# Patient Record
Sex: MALE | HISPANIC OR LATINO | Employment: UNEMPLOYED | URBAN - METROPOLITAN AREA
[De-identification: names, ages, dates, MRNs, and addresses within clinical notes are randomized per-mention and may not be internally consistent; named-entity substitution may affect disease eponyms.]

---

## 2017-01-02 ENCOUNTER — TELEPHONE (OUTPATIENT)
Dept: PEDIATRICS | Facility: CLINIC | Age: 62
End: 2017-01-02

## 2017-01-02 DIAGNOSIS — Z79.4 TYPE 2 DIABETES MELLITUS WITH DIABETIC NEPHROPATHY, WITH LONG-TERM CURRENT USE OF INSULIN (H): Primary | Chronic | ICD-10-CM

## 2017-01-02 DIAGNOSIS — E11.21 TYPE 2 DIABETES MELLITUS WITH DIABETIC NEPHROPATHY, WITH LONG-TERM CURRENT USE OF INSULIN (H): Primary | Chronic | ICD-10-CM

## 2017-01-02 NOTE — TELEPHONE ENCOUNTER
Prior authorization required for : Ta Solostar  Insurance plan: [employer] HERNANDEZ  Patient Id: [id number]  [facility] Desk Number: [phone]    Please fax over an alternative medication to the pharmacy or if you are going to pursue a prior authorization please contact the pharmacy and patient when approved. Thanks!    Thank you  Maribell Barros CPht    [facility] [place] (Monday-Thursday)  Phone: [phone]  Fax: [phone]  [facility] (Friday)  Phone: [phone]  Fax: [phone]

## 2017-01-03 DIAGNOSIS — I50.22 CHRONIC SYSTOLIC HEART FAILURE (H): ICD-10-CM

## 2017-01-03 LAB
ANION GAP SERPL CALCULATED.3IONS-SCNC: 5 MMOL/L (ref 3–14)
BUN SERPL-MCNC: 36 MG/DL (ref 7–30)
CALCIUM SERPL-MCNC: 9 MG/DL (ref 8.5–10.1)
CHLORIDE SERPL-SCNC: 99 MMOL/L (ref 94–109)
CO2 SERPL-SCNC: 30 MMOL/L (ref 20–32)
CREAT SERPL-MCNC: 2.16 MG/DL (ref 0.66–1.25)
GFR SERPL CREATININE-BSD FRML MDRD: 31 ML/MIN/1.7M2
GLUCOSE SERPL-MCNC: 253 MG/DL (ref 70–99)
POTASSIUM SERPL-SCNC: 4.9 MMOL/L (ref 3.4–5.3)
SODIUM SERPL-SCNC: 134 MMOL/L (ref 133–144)

## 2017-01-03 PROCEDURE — 36415 COLL VENOUS BLD VENIPUNCTURE: CPT | Performed by: NURSE PRACTITIONER

## 2017-01-03 PROCEDURE — 80048 BASIC METABOLIC PNL TOTAL CA: CPT | Performed by: NURSE PRACTITIONER

## 2017-01-04 ENCOUNTER — OFFICE VISIT (OUTPATIENT)
Dept: CARDIOLOGY | Facility: CLINIC | Age: 62
End: 2017-01-04
Payer: COMMERCIAL

## 2017-01-04 VITALS
WEIGHT: 190.38 LBS | BODY MASS INDEX: 31.68 KG/M2 | DIASTOLIC BLOOD PRESSURE: 67 MMHG | OXYGEN SATURATION: 97 % | HEART RATE: 78 BPM | SYSTOLIC BLOOD PRESSURE: 111 MMHG

## 2017-01-04 DIAGNOSIS — I50.22 CHRONIC SYSTOLIC HEART FAILURE (H): Primary | ICD-10-CM

## 2017-01-04 PROCEDURE — 99214 OFFICE O/P EST MOD 30 MIN: CPT | Performed by: NURSE PRACTITIONER

## 2017-01-04 NOTE — LETTER
1/4/2017      RE: Lucian Oliveira  [address] [facility] [address]       Dear Colleague,    Thank you for the opportunity to participate in the care of your patient, Lucian Oliveira, at the [facility]. Please see a copy of my visit note below.    No SOB  CP yesterday outside--very cold--tried to go for a walk. Improved with rest and getting back to warm air. Not accompanied by nausea or diaphoresis  No Palpitations, lightheadedness, edema,     Occ bloating. No nausea. Appetite good  2 pillows. Using CPAP. No PND    No recent med changes. No recent NSAIDs        /67 mmHg  Pulse 78  Wt 86.354 kg (190 lb 6 oz)  SpO2 97%  No JVP   Lungs clear   RRR  Belly soft   no edema      Last Basic Metabolic Panel:  NA      134   1/3/2017   POTASSIUM      4.9   1/3/2017  CHLORIDE       99   1/3/2017  BRYANNA      9.0   1/3/2017  CO2       30   1/3/2017  BUN       36   1/3/2017  CR     2.16   1/3/2017  GLC      253   1/3/2017      A/P  Repeat labs in 1 week, if cr still high, consider reducing lasix or wilda  RTC 3 months  Limit potassium rich foods    Please do not hesitate to contact me if you have any questions/concerns.     Sincerely,     Cassandra Mcgovern NP

## 2017-01-04 NOTE — NURSING NOTE
"Chief Complaint   Patient presents with     Follow Up For     3.5 month Return CORE appt; 61yr old male wth a h/o chronic systolic heart failure secondary to ICM presenting for follow-up. Labs prior. Patient reports chest pain when he is outdoors in the cold.       Initial /67 mmHg  Pulse 78  Wt 86.354 kg (190 lb 6 oz)  SpO2 97% Estimated body mass index is 31.68 kg/(m^2) as calculated from the following:    Height as of 12/22/16: 1.651 m (5' 5\").    Weight as of this encounter: 86.354 kg (190 lb 6 oz)..  BP completed using cuff size: dayne Rodriguez M.A.          "

## 2017-01-04 NOTE — PROGRESS NOTES
HPI:    Lucian Oliveira is a pleasant, 60-year-old male with known coronary artery disease status post coronary artery bypass grafting and percutaneous coronary artery intervention in the past, ischemic cardiomyopathy and congestive heart failure, ejection fraction 10%. He has been following in [facility] along with Dr. Sheridan for ongoing management of congestive heart failure and ongoing evaluation for possible advanced heart failure therapies.   Lucian reports feeling well in general with no shortness of breath, palpitations, lightheadedness, edema, orthopnea, or PND. He did note some chest pain yesterday when outside. The pain resolved with coming inside. He relates the pain with the cold temperatures and did not note any accompanying nausea or diaphoresis.   Lucian has not had any recent medication changes and is using his CPAP.    PAST MEDICAL HISTORY:  Past Medical History   Diagnosis Date     NO ACTIVE PROBLEMS        FAMILY HISTORY:  Family History   Problem Relation Age of Onset     DIABETES Brother      DIABETES Sister      DIABETES Sister      Macular Degeneration No family hx of      Glaucoma No family hx of      Myocardial Infarction No family hx of      KIDNEY DISEASE No family hx of        SOCIAL HISTORY:  Social History     Social History     Marital Status:      Spouse Name: N/A     Number of Children: 4     Years of Education: N/A     Occupational History      [employer]     Social History Main Topics     Smoking status: Never Smoker      Smokeless tobacco: Never Used      Comment: Never smoked; non-smoking household     Alcohol Use: Yes      Comment: rare use     Drug Use: No     Sexual Activity:     Partners: Female     Other Topics Concern     Parent/Sibling W/ Cabg, Mi Or Angioplasty Before 65f 55m? No     Social History Narrative       CURRENT MEDICATIONS:  Current Outpatient Prescriptions   Medication     order for DME     blood glucose calibration (ONETOUCH  ULTRA CONTROL) solution     losartan (COZAAR) 50 MG tablet     isosorbide mononitrate (IMDUR) 30 MG 24 hr tablet     furosemide (LASIX) 40 MG tablet     atorvastatin (LIPITOR) 40 MG tablet     glipiZIDE (GLUCOTROL) 5 MG tablet     carvedilol (COREG) 25 MG tablet     clopidogrel (PLAVIX) 75 MG tablet     spironolactone (ALDACTONE) 25 MG tablet     insulin pen needle 31G X 5 MM     albuterol (PROAIR HFA, PROVENTIL HFA, VENTOLIN HFA) 108 (90 BASE) MCG/ACT inhaler     aspirin 81 MG tablet     blood glucose monitoring (ACCU-CHEK FELIPE SMARTVIEW) meter device kit     blood glucose monitoring (ONE TOUCH ULTRA) test strip     blood glucose monitoring (ONE TOUCH DELICA) lancets     insulin glargine (BASAGLAR KWIKPEN) 100 UNIT/ML injection     blood glucose monitoring (NO BRAND SPECIFIED) meter device kit     order for DME     nitroglycerin (NITROSTAT) 0.4 MG SL tablet     No current facility-administered medications for this visit.        ROS:   Constitutional: No fever, chills, or sweats. No weight gain/loss.   ENT: No visual disturbance, ear ache, epistaxis, sore throat.   Allergies/Immunologic: Negative.   Respiratory: No cough, hemoptysis.   Cardiovascular: As per HPI.   GI: No nausea, vomiting, hematemesis, melena, or hematochezia.   : No urinary frequency, dysuria, or hematuria.   Integument: Negative.   Psychiatric: Negative.   Neuro: Negative.   Endocrinology: Negative.   Musculoskeletal: Negative.    EXAM:  /67  Pulse 78  Wt 86.4 kg (190 lb 6 oz)  SpO2 97%  BMI 31.68 kg/m2  General: appears comfortable, alert and articulate  Head: normocephalic, atraumatic  Eyes: anicteric sclera, EOMI  Neck: no adenopathy  Orophyarynx: moist mucosa, no lesions, dentition intact  Heart: regular, S1/S2, no murmur, gallop, rub, JVP is flat  Lungs: clear, no rales or wheezing  Abdomen: soft, non-tender, bowel sounds present, no hepatomegaly  Extremities: no clubbing, cyanosis or edema  Neurological: normal speech and affect, no  gross motor deficits    Labs:   Last Basic Metabolic Panel:  NA      134   1/3/2017   POTASSIUM      4.9   1/3/2017  CHLORIDE       99   1/3/2017  BRYANNA      9.0   1/3/2017  CO2       30   1/3/2017  BUN       36   1/3/2017  CR     2.16   1/3/2017  GLC      253   1/3/2017    Assessment and Plan:   Mr. Oliveira is a pleasant 61 year old man with a history of ischemic cardiomyopathy and advanced heart failure who looks pretty good today though his renal function has worsened for no clear reason. We'll repeat a BMP in 1 week. He will return to clinic in 3 months or as needed.  1. Chronic systolic heart failure secondary to ischemic cardiomyopathy  ACE/ARB-Yes  BB-yes  Ald Ant-yes  SCD Prevention: ICD  %BiV: N/A  Volume: Evolemic  Apnea screening. Using CPAP  Follow up: 3 months in clinic and labs in 1week  2. CAD. On aspirin, beta blocker, and nitroglycerin  3. Chronic kidney disease. Sees Dr. Elaine.     20 minutes spent in direct care, of which >50% in counseling    CC  Patient Care Team:  Anna Archuleta PA-C as PCP - General (Physician Assistant)  Lucille Armendariz, RN as Nurse Coordinator (Cardiology)  Cassandra Mcgovern NP as Nurse Practitioner (Cardiology)

## 2017-01-04 NOTE — PATIENT INSTRUCTIONS
Thank you for coming to the [facility];  please note the following instructions:      1.  Please have your labs rechecked in 1 week.  2.  Please limit your dietarty potassium - avoid high potassium foods  3.  Please make a follow-up CORE/heart failure appt with Cassandra in 3 months with labs prior.       Results for BUCK ALFARO (MRN [id number]) as of 1/4/2017 11:32   Ref. Range 1/3/2017 08:57   Sodium Latest Ref Range: 133-144 mmol/L 134   Potassium Latest Ref Range: 3.4-5.3 mmol/L 4.9   Chloride Latest Ref Range:  mmol/L 99   Carbon Dioxide Latest Ref Range: 20-32 mmol/L 30   Urea Nitrogen Latest Ref Range: 7-30 mg/dL 36 (H)   Creatinine Latest Ref Range: 0.66-1.25 mg/dL 2.16 (H)   GFR Estimate Latest Ref Range: >60 mL/min/1.7m2 31 (L)   GFR Estimate If Black Latest Ref Range: >60 mL/min/1.7m2 38 (L)   Calcium Latest Ref Range: 8.5-10.1 mg/dL 9.0   Anion Gap Latest Ref Range: 3-14 mmol/L 5   Glucose Latest Ref Range: 70-99 mg/dL 253 (H)       Please continue to follow a low salt diet, limit your water intake to 2 liters per day and continue to exercise. 2 Liters a day = 8.5 cups, or 72 ounces.    If you gain 2# in 24 hours or 5# in one week call Jack Armendariz RN so we can adjust your medications as needed over the phone.    [facility] [place] Location    If you have any questions regarding to your visit please contact your care team:     Cardiology  Telephone Number   Jack BONILLA, Cardiology RN  Evette BIRD,[facility]  Lilliana BONILLA, [facility] [phone]; option 1 and then option 3   For scheduling appts:     [phone]           If you need a medication refill please contact your pharmacy.  Please allow 3 business days for your refill to be completed.    As always, Thank you for trusting us with your health care needs!  __________________________________________________________  [facility] Cardiomyopathy, Optimization, Rehabilitation, Education    The [facility] is a heart  "failure specialty clinic within the [facility] where you will work with specialized nurse practioners dedicated to helping patients with heart failure carefully adjust medications, receive education, and learn who and when to call if symptoms develop.  They specialize in helping you better understand your condition, slow the progression of your disease, improve the length and quality of your life, help you detect future heart problems before they become life threatening, and avoid hospitalizations.          Potassium-Rich Foods  The normal adult diet usually contains 2,000 mcg to 4,000 mcg (50 to 100 mEq) of potassium per day. More potassium is needed when there is excess loss of potassium from the body. Diuretic medicines (\"water pills\"), diarrhea, and vomiting can cause this. To increase the amount of potassium in your diet, include these high potassium foods.    [The (*) indicates foods highest in potassium.]  Vegetables  Artichokes - cooked 1/2 cup, 200 mg to 300 mg*  Asparagus - cooked 1/2 cup, 200 mg to 300 mg  Beans, white, red, quigley cooked 1/2 cup, 300 mg to 500 mg*  Beets - cooked 1/2 cup, 200 mg to 300 mg  Broccoli - cooked or raw 1 cup, 200 mg to 500 mg*  [place] sprouts - cooked 1/2 cup, 200 mg to 300 mg  Cabbage - raw 1 cup, 100 mg to 200 mg  Carrots - raw or cooked 1/2 cup, 100 mg to 200 mg  Celery - raw 1 cup, 300 mg to 400 mg   [place] Beans - fresh or frozen 1/2 cup, 300 mg to 500 mg*   Mushrooms - raw or cooked 1/2 cup, 100 mg to 300 mg  Peas - cooked 1/2 cup, 200 mg to 300 mg   Potatoes - baked 1 medium, 500 mg to 900 mg*   Spinach - raw 2 cups, 300 mg to 400 mg *  Spinach - cooked 1 cups, 800 mg to 900 mg*   Squash, winter - fresh, frozen, or cooked 1/2 cup, 200 mg to 400 mg   Tomato - fresh 1 medium, 200 mg to 300 mg   Tomato juice - canned 1/2 cup, 200 mg to 300 mg   Fruits  Apple juice - unsweetened 1 cup, 200 mg to 300 mg   Apricots - canned 1/2 cup, 200 " mg to 300 mg   Apricots - dried 4 pieces, 100 mg to 200 mg   Avocado - raw 1/2 cup, 300 mg to 400 mg*  Banana - fresh 1 small, 300 mg to 400 mg*   Blackberries - fresh or frozen 1 cup, 200 mg to 300 mg   Cantaloupe - fresh 1 cup diced, 300 mg to 400 mg*   Grape juice - unsweetened 1 cup, 200 mg to 300 mg   Honeydew melon - fresh 1 cup diced, 300 mg to 400 mg*   Orange juice - unsweetened, fresh or frozen 1/2 cup, 200 mg to 300 mg  Orange - fresh 1 medium, 200 mg to 300 mg    Pineapple juice - unsweetened 1 cup, 300 mg to 400 mg   Prune juice - unsweetened 1/2 cup, 300 mg to 400 mg*   Prunes - dried 5 pieces, 300 mg to 400 mg*   Strawberries - fresh or frozen 1 cup, 200 mg to 300 mg  Meat  Red Meat - cooked 3 oz, 100 mg to 300 mg   Shrimp - cooked 3/4 cup, 100 mg to 200 mg   Tuna - fresh or canned 3/4 cup, 200 mg to 500 mg   Cod, flounder, halibut - cooked 3 oz, 100 mg to 300 mg*  [place] - cooked 3 oz, 300 mg to 400 mg*   Scallops - cooked 3 oz, 200 mg to 300 mg*    [id number] [employer]. [address] [place] [address]. All rights reserved. This information is not intended as a substitute for professional medical care. Always follow your healthcare professional's instructions.

## 2017-01-04 NOTE — MR AVS SNAPSHOT
After Visit Summary   1/4/2017    Lucian Oliveira    MRN: [id number]           Patient Information     Date Of Birth          1955        Visit Information        Provider Department      1/4/2017 11:00 AM Cassandra Mcgovern NP; [employer] [employer] [facility] PHYSICIANS HEART AT [facility]        Today's Diagnoses     Chronic systolic heart failure (H)    -  1       Care Instructions    Thank you for coming to the [facility] Heart @ [facility];  please note the following instructions:      1.  Please have your labs rechecked in 1 week.  2.  Please limit your dietarty potassium - avoid high potassium foods  3.  Please make a follow-up CORE/heart failure appt with Cassandra in 3 months with labs prior.       Results for LUCIAN OLIVEIRA (MRN [id number]) as of 1/4/2017 11:32   Ref. Range 1/3/2017 08:57   Sodium Latest Ref Range: 133-144 mmol/L 134   Potassium Latest Ref Range: 3.4-5.3 mmol/L 4.9   Chloride Latest Ref Range:  mmol/L 99   Carbon Dioxide Latest Ref Range: 20-32 mmol/L 30   Urea Nitrogen Latest Ref Range: 7-30 mg/dL 36 (H)   Creatinine Latest Ref Range: 0.66-1.25 mg/dL 2.16 (H)   GFR Estimate Latest Ref Range: >60 mL/min/1.7m2 31 (L)   GFR Estimate If Black Latest Ref Range: >60 mL/min/1.7m2 38 (L)   Calcium Latest Ref Range: 8.5-10.1 mg/dL 9.0   Anion Gap Latest Ref Range: 3-14 mmol/L 5   Glucose Latest Ref Range: 70-99 mg/dL 253 (H)       Please continue to follow a low salt diet, limit your water intake to 2 liters per day and continue to exercise. 2 Liters a day = 8.5 cups, or 72 ounces.    If you gain 2# in 24 hours or 5# in one week call Jack Armendariz RN so we can adjust your medications as needed over the phone.    [facility] [[email or web address]] Cardiology - [place] Location    If you have any questions regarding to your visit please contact your care team:     Cardiology  Telephone Number   Jack BONILLA, Cardiology AURORA BIRD,KUSUM BONILLA, [facility] [phone];  "option 1 and then option 3   For scheduling appts:     [phone]           If you need a medication refill please contact your pharmacy.  Please allow 3 business days for your refill to be completed.    As always, Thank you for trusting us with your health care needs!  __________________________________________________________  C.[facility].E[facility]INIC Cardiomyopathy, Optimization, Rehabilitation, Education    The C.O.R.E. CLINIC is a heart failure specialty clinic within the [facility] where you will work with specialized nurse practioners dedicated to helping patients with heart failure carefully adjust medications, receive education, and learn who and when to call if symptoms develop.  They specialize in helping you better understand your condition, slow the progression of your disease, improve the length and quality of your life, help you detect future heart problems before they become life threatening, and avoid hospitalizations.          Potassium-Rich Foods  The normal adult diet usually contains 2,000 mcg to 4,000 mcg (50 to 100 mEq) of potassium per day. More potassium is needed when there is excess loss of potassium from the body. Diuretic medicines (\"water pills\"), diarrhea, and vomiting can cause this. To increase the amount of potassium in your diet, include these high potassium foods.    [The (*) indicates foods highest in potassium.]  Vegetables  Artichokes - cooked 1/2 cup, 200 mg to 300 mg*  Asparagus - cooked 1/2 cup, 200 mg to 300 mg  Beans, white, red, quigley cooked 1/2 cup, 300 mg to 500 mg*  Beets - cooked 1/2 cup, 200 mg to 300 mg  Broccoli - cooked or raw 1 cup, 200 mg to 500 mg*  [place]et sprouts - cooked 1/2 cup, 200 mg to 300 mg  Cabbage - raw 1 cup, 100 mg to 200 mg  Carrots - raw or cooked 1/2 cup, 100 mg to 200 mg  Celery - raw 1 cup, 300 mg to 400 mg   [place]ima Beans - fresh or frozen 1/2 cup, 300 mg to 500 mg*   Mushrooms - raw or cooked 1/2 cup, 100 mg to " 300 mg  Peas - cooked 1/2 cup, 200 mg to 300 mg   Potatoes - baked 1 medium, 500 mg to 900 mg*   Spinach - raw 2 cups, 300 mg to 400 mg *  Spinach - cooked 1 cups, 800 mg to 900 mg*   Squash, winter - fresh, frozen, or cooked 1/2 cup, 200 mg to 400 mg   Tomato - fresh 1 medium, 200 mg to 300 mg   Tomato juice - canned 1/2 cup, 200 mg to 300 mg   Fruits  Apple juice - unsweetened 1 cup, 200 mg to 300 mg   Apricots - canned 1/2 cup, 200 mg to 300 mg   Apricots - dried 4 pieces, 100 mg to 200 mg   Avocado - raw 1/2 cup, 300 mg to 400 mg*  Banana - fresh 1 small, 300 mg to 400 mg*   Blackberries - fresh or frozen 1 cup, 200 mg to 300 mg   Cantaloupe - fresh 1 cup diced, 300 mg to 400 mg*   Grape juice - unsweetened 1 cup, 200 mg to 300 mg   Honeydew melon - fresh 1 cup diced, 300 mg to 400 mg*   Orange juice - unsweetened, fresh or frozen 1/2 cup, 200 mg to 300 mg  Orange - fresh 1 medium, 200 mg to 300 mg    Pineapple juice - unsweetened 1 cup, 300 mg to 400 mg   Prune juice - unsweetened 1/2 cup, 300 mg to 400 mg*   Prunes - dried 5 pieces, 300 mg to 400 mg*   Strawberries - fresh or frozen 1 cup, 200 mg to 300 mg  Meat  Red Meat - cooked 3 oz, 100 mg to 300 mg   Shrimp - cooked 3/4 cup, 100 mg to 200 mg   Tuna - fresh or canned 3/4 cup, 200 mg to 500 mg   Cod, flounder, halibut - cooked 3 oz, 100 mg to 300 mg*  Polk - cooked 3 oz, 300 mg to 400 mg*   Scallops - cooked 3 oz, 200 mg to 300 mg*    5232-5376 [employer]. [address] [place] [address]. All rights reserved. This information is not intended as a substitute for professional medical care. Always follow your healthcare professional's instructions.              Follow-ups after your visit        Your next 10 appointments already scheduled     Jan 05, 2017  7:30 AM   Office Visit with Anna Archuleta PA-C, [employer] [employer] [facility]   [facility]    [address]  [place]  [address]   [phone]           Bring a current list of meds and any records pertaining to this visit.  For Physicals, please bring immunization records and any forms needing to be filled out.  Please arrive 10 minutes early to complete paperwork.            Jan 11, 2017  7:40 AM   Return Sleep Patient with Kai Valdez MD   [facility] ([facility])    [address] [address]   [phone]            Jan 12, 2017  7:45 AM   [facility]    [address] [address]   [phone]           Patient must bring picture ID.  Patient should be prepared to give a urine specimen  Please do not eat 10-12 hours before your appointment if you are coming in fasting for labs on lipids, cholesterol, or glucose (sugar).  Pregnant women should follow their Care Team instructions. Water with medications is okay. Do not drink coffee or other fluids.   If you have concerns about taking  your medications, please ask at office or if scheduling via Veronica, send a message by clicking on Secure Messaging, Message Your Care Team.            Apr 25, 2017  7:45 AM   LAB with [facility] LAB   [facility]    [address] [address]   [phone]           Patient must bring picture ID.  Patient should be prepared to give a urine specimen  Please do not eat 10-12 hours before your appointment if you are coming in fasting for labs on lipids, cholesterol, or glucose (sugar).  Pregnant women should follow their Care Team instructions. Water with medications is okay. Do not drink coffee or other fluids.   If you have concerns about taking  your medications, please ask at office or if scheduling via Veronica, send a message by clicking on Secure Messaging, Message Your Care Team.            Apr 26, 2017 10:30 AM   Return Visit with Cassandra  Wade Mcgovern NP   [facility] PHYSICIANS HEART AT [place] ([facility])    [address]  [place] [address]   [phone]            Jun 05, 2017 10:00 AM   LAB with ACUTE CARE LAB UU   [facility], Lab ([facility])    [address] [address]              Patient must bring picture ID.  Patient should be prepared to give a urine specimen  Please do not eat 10-12 hours before your appointment if you are coming in fasting for labs on lipids, cholesterol, or glucose (sugar).  Pregnant women should follow their Care Team instructions. Water with medications is okay. Do not drink coffee or other fluids.   If you have concerns about taking  your medications, please ask at office or if scheduling via Canal do Credito, send a message by clicking on Secure Messaging, Message Your Care Team.            Jun 05, 2017 10:30 AM   Card Cardpul Stress Tst Adult with UUEKGS   [facility] ELECTROCARDI[facility] ([facility])    [address] [address]               Jun 05, 2017  1:00 PM   Ech Coleman with UUETEER1   [facility],  Echocardiography ([facility])    [address] [address]   [phone]           1.  Please bring or wear a comfortable two-piece outfit. 2.  Arrival time: -   [facility]:  arrive 75 minutes prior to examination time. -   [facility]:  arrive 90 minutes prior to examination time. -   [facility]:   arrive 15 minutes prior to examination time. 3.  Plan to have someone here to drive you home after the test. -   Someone should stay with you for 6 hours after your test. 4.  No food or drink: -   6 hours before the test 5.  If you take antacids or water pills (diuretics): Do not take them until after your test. You may take blood pressure medicine with a few sips of water. 6.  If you have diabetes: -    Morning slots preferred -   If you take insulin, call your diabetes care team. Ask if you should take a   dose the morning of your test. -   If you take diabetes medicine by mouth, don't take it on the morning of your test. Bring it with you to take after the test. (If you have questions, call your diabetes care team.) 7.  Bring a list of any medicines you are taking. 8.  Do not drive for 24 hours after the test. 9.  For any questions that cannot be answered, please contact the ordering physician            Jun 05, 2017  3:00 PM   (Arrive by 2:45 PM)   Implanted Defibulator with Uc Cv Device [address] [place] [address] ([facility])    [address] [facility] [address] [phone]              Future tests that were ordered for you today     Open Future Orders        Priority Expected Expires Ordered    Basic metabolic panel Routine 1/9/2017 1/13/2017 1/4/2017            Who to contact     If you have questions or need follow up information about today's clinic visit or your schedule please contact [facility] directly at [phone].  Normal or non-critical lab and imaging results will be communicated to you by MyChart, letter or phone within 4 business days after the clinic has received the results. If you do not hear from us within 7 days, please contact the clinic through Smilehart or phone. If you have a critical or abnormal lab result, we will notify you by phone as soon as possible.  Submit refill requests through COINTERRA or call your pharmacy and they will forward the refill request to us. Please allow 3 business days for your refill to be completed.          Additional Information About Your Visit        Smilehar[employer]ountysource Information     [employer] gives you secure access to your electronic health record. If you see a primary care provider, you can also send messages to your care team and make appointments. If you have  questions, please call your primary care clinic.  If you do not have a primary care provider, please call [phone] and they will assist you.        Care EveryWhere ID     This is your Care EveryWhere ID. This could be used by other organizations to access your [facility] medical records  [id number]        Your Vitals Were     Pulse Pulse Oximetry                78 97%           Blood Pressure from Last 3 Encounters:   01/04/17 111/67   12/22/16 119/77   11/07/16 106/66    Weight from Last 3 Encounters:   01/04/17 86.354 kg (190 lb 6 oz)   12/22/16 86.183 kg (190 lb)   11/07/16 83.915 kg (185 lb)               Primary Care Provider Office Phone # Fax #    Anna Archuleta PA-C [phone]       [facility] [place] [address]  [place] [address]        Thank you!     Thank you for choosing [facility] PHYSICIANS HEART AT [facility]  for your care. Our goal is always to provide you with excellent care. Hearing back from our patients is one way we can continue to improve our services. Please take a few minutes to complete the written survey that you may receive in the mail after your visit with us. Thank you!             Your Updated Medication List - Protect others around you: Learn how to safely use, store and throw away your medicines at www.disposemymeds.org.          This list is accurate as of: 1/4/17 11:54 AM.  Always use your most recent med list.                   Brand Name Dispense Instructions for use    albuterol 108 (90 BASE) MCG/ACT Inhaler    PROAIR HFA/PROVENTIL HFA/VENTOLIN HFA    1 Inhaler    Inhale 2 puffs into the lungs 3 times daily       aspirin 81 MG tablet     30 tablet    Take 1 tablet (81 mg) by mouth daily       atorvastatin 40 MG tablet    LIPITOR    90 tablet    Take 1 tablet (40 mg) by mouth daily       blood glucose calibration solution     1 Bottle    Use to calibrate blood glucose monitor as directed.       blood glucose monitoring lancets      1 Box    Use to test blood sugars   times daily or as directed.       blood glucose monitoring meter device kit     1 kit    Use to test blood sugar 2 times daily, as directed.       blood glucose monitoring test strip    ONE TOUCH ULTRA    3 Month    Use to test blood sugars 2-3 times daily or as directed.       carvedilol 25 MG tablet    COREG    180 tablet    Take 1 tablet (25 mg) by mouth 2 times daily (with meals)       clopidogrel 75 MG tablet    PLAVIX    90 tablet    Take 1 tablet (75 mg) by mouth daily       furosemide 40 MG tablet    LASIX    180 tablet    Take 1 tablet (40 mg) by mouth daily Take an extra 40mg as needed if weight goes up 2# overnight, or more than 5# in 1 week.       glipiZIDE 5 MG tablet    GLUCOTROL    180 tablet    Take 1 tablet (5 mg) by mouth 2 times daily (before meals)       insulin glargine 100 UNIT/ML injection    LANTUS SOLOSTAR    1 Month    Inject 18 Units Subcutaneous At Bedtime       insulin pen needle 31G X 5 MM     100 each    Use one needle daily, as directed       isosorbide mononitrate 30 MG 24 hr tablet    IMDUR    45 tablet    Take 1 tablet (30 mg) by mouth daily       losartan 50 MG tablet    COZAAR    135 tablet    Take 1 tablet (50 mg) by mouth daily       nitroglycerin 0.4 MG sublingual tablet    NITROSTAT    10 tablet    Place 1 tablet (0.4 mg) under the tongue every 5 minutes as needed for chest pain If you are still having symptoms after 3 doses (15 minutes) call 911.       order for DME      Equipment being ordered: {Select DME item(s) to order:079682}       order for DME     1 Units    Auto CPAP 6-18 cmH20       spironolactone 25 MG tablet    ALDACTONE    45 tablet    Take 0.5 tablets (12.5 mg) by mouth daily

## 2017-01-04 NOTE — NURSING NOTE
Diet: Patient instructed regarding a heart failure healthy diet, including discussion of reduced fat and 2000 mg daily sodium restriction, daily weights, medication purpose and compliance, fluid restrictions and resources for patient and family to access for assistance with heart failure management.       Labs: Patient was given results of the laboratory testing obtained today and patient was instructed about when to return for the next laboratory testing.    Med Reconcile: Reviewed and verified all current medications with the patient. The updated medication list was printed and given to the patient.    Return Appointment: Patient given instructions regarding scheduling next clinic visit.     Patient stated he understood all health information given and agreed to call with further questions or concerns.    Lucille Armendariz RN

## 2017-01-05 ENCOUNTER — OFFICE VISIT (OUTPATIENT)
Dept: FAMILY MEDICINE | Facility: CLINIC | Age: 62
End: 2017-01-05
Payer: COMMERCIAL

## 2017-01-05 ENCOUNTER — TELEPHONE (OUTPATIENT)
Dept: PEDIATRICS | Facility: CLINIC | Age: 62
End: 2017-01-05

## 2017-01-05 VITALS
HEIGHT: 65 IN | WEIGHT: 190 LBS | SYSTOLIC BLOOD PRESSURE: 106 MMHG | BODY MASS INDEX: 31.65 KG/M2 | DIASTOLIC BLOOD PRESSURE: 68 MMHG | OXYGEN SATURATION: 98 % | TEMPERATURE: 98.7 F | HEART RATE: 72 BPM

## 2017-01-05 DIAGNOSIS — I10 HYPERTENSION GOAL BP (BLOOD PRESSURE) < 140/90: Chronic | ICD-10-CM

## 2017-01-05 DIAGNOSIS — E11.21 TYPE 2 DIABETES MELLITUS WITH DIABETIC NEPHROPATHY, WITH LONG-TERM CURRENT USE OF INSULIN (H): Primary | Chronic | ICD-10-CM

## 2017-01-05 DIAGNOSIS — I50.9 CHF (NYHA CLASS II, ACC/AHA STAGE C) (H): Chronic | ICD-10-CM

## 2017-01-05 DIAGNOSIS — I50.22 CHRONIC SYSTOLIC HEART FAILURE (H): Chronic | ICD-10-CM

## 2017-01-05 DIAGNOSIS — Z79.4 TYPE 2 DIABETES MELLITUS WITH DIABETIC NEPHROPATHY, WITH LONG-TERM CURRENT USE OF INSULIN (H): Primary | Chronic | ICD-10-CM

## 2017-01-05 DIAGNOSIS — N18.30 CKD (CHRONIC KIDNEY DISEASE) STAGE 3, GFR 30-59 ML/MIN (H): Chronic | ICD-10-CM

## 2017-01-05 LAB
HBA1C MFR BLD: 10.7 % (ref 4.3–6)
TSH SERPL DL<=0.005 MIU/L-ACNC: 1.44 MU/L (ref 0.4–4)

## 2017-01-05 PROCEDURE — 83036 HEMOGLOBIN GLYCOSYLATED A1C: CPT | Performed by: PHYSICIAN ASSISTANT

## 2017-01-05 PROCEDURE — 84443 ASSAY THYROID STIM HORMONE: CPT | Performed by: PHYSICIAN ASSISTANT

## 2017-01-05 PROCEDURE — 36415 COLL VENOUS BLD VENIPUNCTURE: CPT | Performed by: PHYSICIAN ASSISTANT

## 2017-01-05 PROCEDURE — 99214 OFFICE O/P EST MOD 30 MIN: CPT | Performed by: PHYSICIAN ASSISTANT

## 2017-01-05 RX ORDER — INSULIN GLARGINE 100 [IU]/ML
23 INJECTION, SOLUTION SUBCUTANEOUS AT BEDTIME
Qty: 8 PEN | Refills: 1 | Status: SHIPPED | OUTPATIENT
Start: 2017-01-05 | End: 2017-02-07

## 2017-01-05 NOTE — NURSING NOTE
"Chief Complaint   Patient presents with     Recheck Medication       Initial /68 mmHg  Pulse 72  Temp(Src) 98.7  F (37.1  C) (Oral)  Ht 5' 5\" (1.651 m)  Wt 190 lb (86.183 kg)  BMI 31.62 kg/m2  SpO2 98% Estimated body mass index is 31.62 kg/(m^2) as calculated from the following:    Height as of this encounter: 5' 5\" (1.651 m).    Weight as of this encounter: 190 lb (86.183 kg).  BP completed using cuff size: regular  Ynes Fam MA      "

## 2017-01-05 NOTE — PATIENT INSTRUCTIONS
Continue the Glipizide twice daily.  Increase your insulin to 21 units. After 1 week increase your insulin again to 23 units.  Check your blood sugar in the morning (fasting), 2 hours after lunch, and 2 hours after dinner. Record these number so I can review them.  Bring your blood sugar log to our follow up in 1 month.    Continúe el Glipizide dos veces al día. Aumente lianez insulina a 21 unidades. Después de 1 semana, aumente de nuevo la insulina a 23 unidades. Compruebe lainez nivel de azúcar en la sparkle por la mañana (ayuno), 2 horas después del almuerzo y 2 horas después de la jesusita. Registre estos números para poder revisarlos. Traiga lainez registro de azúcar en la sparkle a nuestro seguimiento en un mes.

## 2017-01-05 NOTE — MR AVS SNAPSHOT
After Visit Summary   1/5/2017    Lucian Oliveira    MRN: [id number]           Patient Information     Date Of Birth          1955        Visit Information        Provider Department      1/5/2017 7:30 AM Anna Archuleta PA-C; [employer] [facility] [place]        Today's Diagnoses     Type 2 diabetes mellitus with diabetic nephropathy, with long-term current use of insulin (H)    -  1       Care Instructions    Continue the Glipizide twice daily.  Increase your insulin to 21 units. After 1 week increase your insulin again to 23 units.  Check your blood sugar in the morning (fasting), 2 hours after lunch, and 2 hours after dinner. Record these number so I can review them.  Bring your blood sugar log to our follow up in 1 month.    Continúe el Glipizide dos veces al día. Aumente lainez insulina a 21 unidades. Después de 1 semana, aumente de nuevo la insulina a 23 unidades. Compruebe lainez nivel de azúcar en la sparkle por la mañana (ayuno), 2 horas después del almuerzo y 2 horas después de la jesusita. Registre estos números para poder revisarlos. Traiga lainez registro de azúcar en la sparkle a nuestro seguimiento en un mes.          Follow-ups after your visit        Your next 10 appointments already scheduled     Jan 11, 2017  7:40 AM   Return Sleep Patient with Kai Valdez MD   [facility] ([facility])    [address] [address] [id number] [phone]            Jan 12, 2017  7:45 AM   LAB with  LAB   [facility] ([facility])    [address] [place] [address] [phone]           Patient must bring picture ID.  Patient should be prepared to give a urine specimen  Please do not eat 10-12 hours before your appointment if you are coming in fasting for labs on lipids, cholesterol, or glucose (sugar).  Pregnant women should follow their Care Team instructions. Water  with medications is okay. Do not drink coffee or other fluids.   If you have concerns about taking  your medications, please ask at office or if scheduling via Medlert, send a message by clicking on Secure Messaging, Message Your Care Team.            Apr 25, 2017  7:45 AM   LAB with FK LAB   [facility] ([facility])    [address] [place] [address]   [phone]           Patient must bring picture ID.  Patient should be prepared to give a urine specimen  Please do not eat 10-12 hours before your appointment if you are coming in fasting for labs on lipids, cholesterol, or glucose (sugar).  Pregnant women should follow their Care Team instructions. Water with medications is okay. Do not drink coffee or other fluids.   If you have concerns about taking  your medications, please ask at office or if scheduling via Medlert, send a message by clicking on Secure Messaging, Message Your Care Team.            Apr 26, 2017 10:30 AM   Return Visit with Cassandra Mcgovern NP   [facility] ([facility])    [address] [place] [address] [address]nd [facility] [address]   [phone]            Jun 05, 2017 10:00 AM   LAB with ACUTE CARE LAB [facility] [place], Lab ([facility])    [address] [facility] [place] [address]              Patient must bring picture ID.  Patient should be prepared to give a urine specimen  Please do not eat 10-12 hours before your appointment if you are coming in fasting for labs on lipids, cholesterol, or glucose (sugar).  Pregnant women should follow their Care Team instructions. Water with medications is okay. Do not drink coffee or other fluids.   If you have concerns about taking  your medications, please ask at office or if scheduling via Chatoust, send a message by clicking on Secure Messaging, Message Your Care Team.            Jun 05, 2017 10:30 AM   Card  Cardpul Stress Tst Adult with UUEKGS   [facility] ([facility])    [address] [facility] [address]               Jun 05, 2017  1:00 PM   Ech Coleman with UUETEER1   [facility] [place],  Echocardiography ([facility])    [address] [facility] [address] [phone]           1.  Please bring or wear a comfortable two-piece outfit. 2.  Arrival time: -   [facility]:  arrive 75 minutes prior to examination time. -   [facility]:  arrive 90 minutes prior to examination time. -   [facility]:   arrive 15 minutes prior to examination time. 3.  Plan to have someone here to drive you home after the test. -   Someone should stay with you for 6 hours after your test. 4.  No food or drink: -   6 hours before the test 5.  If you take antacids or water pills (diuretics): Do not take them until after your test. You may take blood pressure medicine with a few sips of water. 6.  If you have diabetes: -   Morning slots preferred -   If you take insulin, call your diabetes care team. Ask if you should take a   dose the morning of your test. -   If you take diabetes medicine by mouth, don't take it on the morning of your test. Bring it with you to take after the test. (If you have questions, call your diabetes care team.) 7.  Bring a list of any medicines you are taking. 8.  Do not drive for 24 hours after the test. 9.  For any questions that cannot be answered, please contact the ordering physician            Jun 05, 2017  3:00 PM   (Arrive by 2:45 PM)   Implanted Defibulator with Uc Cv Device 1   [facility] ([facility])    [address] [place] [address]   [phone]            Jun 05, 2017  3:30 PM   (Arrive by 3:15 PM)   RETURN HEART FAILURE with Arvin Sheridan MD   [facility])    [address] [place] [address]  "[facility] [address] [phone]              Future tests that were ordered for you today     Open Future Orders        Priority Expected Expires Ordered    Basic metabolic panel Routine 1/9/2017 1/13/2017 1/4/2017            Who to contact     Normal or non-critical lab and imaging results will be communicated to you by Care Team Connecthart, letter or phone within 4 business days after the clinic has received the results. If you do not hear from us within 7 days, please contact the clinic through Avvenut or phone. If you have a critical or abnormal lab result, we will notify you by phone as soon as possible.  Submit refill requests through [email or web address] or call your pharmacy and they will forward the refill request to us. Please allow 3 business days for your refill to be completed.          If you need to speak with a  for additional information , please call: [phone]             Additional Information About Your Visit        [email or web address] Information     [email or web address] gives you secure access to your electronic health record. If you see a primary care provider, you can also send messages to your care team and make appointments. If you have questions, please call your primary care clinic.  If you do not have a primary care provider, please call [phone] and they will assist you.        Care EveryWhere ID     This is your Care EveryWhere ID. This could be used by other organizations to access your [place] medical records  [id number]        Your Vitals Were     Pulse Temperature Height BMI (Body Mass Index) Pulse Oximetry       72 98.7  F (37.1  C) (Oral) 5' 5\" (1.651 m) 31.62 kg/m2 98%        Blood Pressure from Last 3 Encounters:   01/05/17 106/68   01/04/17 111/67   12/22/16 119/77    Weight from Last 3 Encounters:   01/05/17 190 lb (86.183 kg)   01/04/17 190 lb 6 oz (86.354 kg)   12/22/16 190 lb (86.183 kg)              We Performed the Following     Hemoglobin A1c     TSH with free T4 reflex        " Primary Care Provider Office Phone # Fax #    Anna Archuleta PA-C [phone]       [facility] [address]  [place] [address]        Thank you!     Thank you for choosing [facility]  for your care. Our goal is always to provide you with excellent care. Hearing back from our patients is one way we can continue to improve our services. Please take a few minutes to complete the written survey that you may receive in the mail after your visit with us. Thank you!             Your Updated Medication List - Protect others around you: Learn how to safely use, store and throw away your medicines at www.disposemymeds.org.          This list is accurate as of: 1/5/17  8:08 AM.  Always use your most recent med list.                   Brand Name Dispense Instructions for use    albuterol 108 (90 BASE) MCG/ACT Inhaler    PROAIR HFA/PROVENTIL HFA/VENTOLIN HFA    1 Inhaler    Inhale 2 puffs into the lungs 3 times daily       aspirin 81 MG tablet     30 tablet    Take 1 tablet (81 mg) by mouth daily       atorvastatin 40 MG tablet    LIPITOR    90 tablet    Take 1 tablet (40 mg) by mouth daily       blood glucose calibration solution     1 Bottle    Use to calibrate blood glucose monitor as directed.       blood glucose monitoring lancets     1 Box    Use to test blood sugars   times daily or as directed.       blood glucose monitoring meter device kit     1 kit    Use to test blood sugar 2 times daily, as directed.       blood glucose monitoring test strip    ONE TOUCH ULTRA    3 Month    Use to test blood sugars 2-3 times daily or as directed.       carvedilol 25 MG tablet    COREG    180 tablet    Take 1 tablet (25 mg) by mouth 2 times daily (with meals)       clopidogrel 75 MG tablet    PLAVIX    90 tablet    Take 1 tablet (75 mg) by mouth daily       furosemide 40 MG tablet    LASIX    180 tablet    Take 1 tablet (40 mg) by mouth daily Take an extra 40mg as needed if weight goes up  2# overnight, or more than 5# in 1 week.       glipiZIDE 5 MG tablet    GLUCOTROL    180 tablet    Take 1 tablet (5 mg) by mouth 2 times daily (before meals)       insulin glargine 100 UNIT/ML injection    LANTUS SOLOSTAR    1 Month    Inject 18 Units Subcutaneous At Bedtime       insulin pen needle 31G X 5 MM     100 each    Use one needle daily, as directed       isosorbide mononitrate 30 MG 24 hr tablet    IMDUR    45 tablet    Take 1 tablet (30 mg) by mouth daily       losartan 50 MG tablet    COZAAR    135 tablet    Take 1 tablet (50 mg) by mouth daily       nitroglycerin 0.4 MG sublingual tablet    NITROSTAT    10 tablet    Place 1 tablet (0.4 mg) under the tongue every 5 minutes as needed for chest pain If you are still having symptoms after 3 doses (15 minutes) call 911.       order for DME      Equipment being ordered: {Select DME item(s) to order:628032}       order for DME     1 Units    Auto CPAP 6-18 cmH20       spironolactone 25 MG tablet    ALDACTONE    45 tablet    Take 0.5 tablets (12.5 mg) by mouth daily

## 2017-01-05 NOTE — TELEPHONE ENCOUNTER
PA for Lantus Solostar denied. Formulary alternatives have not been tried. Formulary alternatives (Basaglar (insulin Glargine), Apidra)

## 2017-01-05 NOTE — TELEPHONE ENCOUNTER
Written qty for basaglar was 8, may we change it to 15 (1 box)  Robyn Pearson, [facility]  Phone [phone]  Fax      [phone]

## 2017-01-05 NOTE — PROGRESS NOTES
SUBJECTIVE:                                                    Lucian Oliveira is a 61 year old male who presents to clinic today for the following health issues:    Diabetes Follow-up    Patient is checking blood sugars: once daily.  Results are as follows:         am - around 180     Diabetic concerns: once in a while over 200      Symptoms of hypoglycemia (low blood sugar): none     Paresthesias (numbness or burning in feet) or sores: No     Date of last diabetic eye exam: 8/2016       Amount of exercise or physical activity: usually 20-30 a day but since winter about 2x week     Problems taking medications regularly: No    Medication side effects: none    Diet: regular (no restrictions)      Problem list and histories reviewed & adjusted, as indicated.  Additional history: as documented    Patient Active Problem List   Diagnosis     CAD (coronary artery disease)     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)     Hyperlipidemia LDL goal <100     Hypertension goal BP (blood pressure) < 140/90     Advanced directives, counseling/discussion     CHF (NYHA class II, ACC/AHA stage C) (H)     CKD (chronic kidney disease) stage 3, GFR 30-59 ml/min     Health Care Home     Automatic implantable cardioverter-defibrillator in situ     Chronic systolic heart failure (H)     Proteinuria     Vitamin D deficiency     OA (osteoarthritis) of knee     Chondromalacia of patella, unspecified laterality     Pain in joint of left shoulder     Tinnitus     Ptosis of right eyelid     Secondary renal hyperparathyroidism (H)     Cortical cataract of both eyes     Moderate nonproliferative diabetic retinopathy, without macular edema, associated with type 2 diabetes mellitus     Non morbid obesity due to excess calories     CHANTEL (obstructive sleep apnea)- severe (AHI 30)     Past Surgical History   Procedure Laterality Date     C cabg, artery-vein, three  02/2008     Implant automatic implantable cardioverter defibrillator        "  Social History   Substance Use Topics     Smoking status: Never Smoker      Smokeless tobacco: Never Used      Comment: Never smoked; non-smoking household     Alcohol Use: No      Comment: rare use     Family History   Problem Relation Age of Onset     DIABETES Brother      DIABETES Sister      DIABETES Sister      Macular Degeneration No family hx of      Glaucoma No family hx of      Myocardial Infarction No family hx of      KIDNEY DISEASE No family hx of            ROS:  Constitutional, endocrine, cardiac systems are negative, except as otherwise noted.    OBJECTIVE:                                                    /68 mmHg  Pulse 72  Temp(Src) 98.7  F (37.1  C) (Oral)  Ht 5' 5\" (1.651 m)  Wt 190 lb (86.183 kg)  BMI 31.62 kg/m2  SpO2 98%  Body mass index is 31.62 kg/(m^2).  GENERAL APPEARANCE: alert, no distress and over weight  MS: extremities normal- no gross deformities noted  NEURO: Normal strength and tone  PSYCH: mentation appears normal and affect normal/bright    Diagnostic test results:  Diagnostic Test Results:  Results for orders placed or performed in visit on 01/05/17 (from the past 24 hour(s))   Hemoglobin A1c   Result Value Ref Range    Hemoglobin A1C 10.7 (H) 4.3 - 6.0 %        ASSESSMENT:                                                      1. Type 2 diabetes mellitus with diabetic nephropathy, with long-term current use of insulin (H)    2. Chronic systolic heart failure (H)    3. CKD (chronic kidney disease) stage 3, GFR 30-59 ml/min    4. CHF (NYHA class II, ACC/AHA stage C) (H)    5. Hypertension goal BP (blood pressure) < 140/90         PLAN:                                                      Patient Instructions   Continue the Glipizide twice daily.  Increase your insulin to 21 units. After 1 week increase your insulin again to 23 units.  Check your blood sugar in the morning (fasting), 2 hours after lunch, and 2 hours after dinner. Record these number so I can review " them.  Bring your blood sugar log to our follow up in 1 month.    Continúe el Glipizide dos veces al día. Aumente lainez insulina a 21 unidades. Después de 1 semana, aumente de nuevo la insulina a 23 unidades. Compruebe lainez nivel de azúcar en la sparkle por la mañana (ayuno), 2 horas después del almuerzo y 2 horas después de la jesusita. Registre estos números para poder revisarlos. Traiga lainez registro de azúcar en la sparkle a nuestro seguimiento en un mes.    The patient was in agreement with the plan today and had no questions or concerns prior to leaving the clinic.      Anna Archuleta PA-C  [facility]

## 2017-01-05 NOTE — TELEPHONE ENCOUNTER
Lantus is not covered by patient's insurance. Per insurance, please change to Basaglar or Tresiba.    Please send new rx.    Thanks,  Laila Mart  Pharmacy Technician  [facility]  Ph# [phone]  Fax# [phone]

## 2017-01-11 ENCOUNTER — OFFICE VISIT (OUTPATIENT)
Dept: SLEEP MEDICINE | Facility: CLINIC | Age: 62
End: 2017-01-11
Payer: COMMERCIAL

## 2017-01-11 VITALS
WEIGHT: 191 LBS | OXYGEN SATURATION: 97 % | DIASTOLIC BLOOD PRESSURE: 82 MMHG | SYSTOLIC BLOOD PRESSURE: 127 MMHG | HEART RATE: 76 BPM | HEIGHT: 65 IN | BODY MASS INDEX: 31.82 KG/M2

## 2017-01-11 DIAGNOSIS — G47.33 OSA (OBSTRUCTIVE SLEEP APNEA): Primary | Chronic | ICD-10-CM

## 2017-01-11 PROCEDURE — 99213 OFFICE O/P EST LOW 20 MIN: CPT | Performed by: INTERNAL MEDICINE

## 2017-01-11 NOTE — NURSING NOTE
"Chief Complaint   Patient presents with     RECHECK     CPAP f/u       Initial /82 mmHg  Pulse 76  Ht 1.651 m (5' 5\")  Wt 86.637 kg (191 lb)  BMI 31.78 kg/m2  SpO2 97% Estimated body mass index is 31.78 kg/(m^2) as calculated from the following:    Height as of this encounter: 1.651 m (5' 5\").    Weight as of this encounter: 86.637 kg (191 lb).  BP completed using cuff size: regular  ESS 8    Gabrielle Corcoran, KUSUM      "

## 2017-01-11 NOTE — PROGRESS NOTES
Obstructive Sleep Apnea- PAP Follow-Up Visit:    Chief Complaint   Patient presents with     RECHECK     CPAP f/u       Initial sleep study done on 10/3/2016 at the [facility] for Loud snoring, sleep maintenance difficulties, sore throat at night, narrowed oropharynx. Comorbid obesity, diabetes, hypertension, chronic systolic congestive heart failure.     Study Date: 10/03/2016    Events - During the diagnostic portion of the study, the polysomnogram revealed a presence of 18 obstructive, 0 central, and 0 mixed apneas resulting in an apnea index of 8.4 events per hour.  There were 48 hypopneas resulting in a hypopnea index of 22.4 events per hour.  The combined apnea/hypopnea index was 30.8 events per hour.  The REM AHI was 40.0 events per hour.  The supine AHI was n/a.  The RERA index was 2.3 events per hour.  The RDI was 33.1 events per hour.     Sleep Associated Hypoxemia -   Baseline oxygen saturation was 93.7%. Lowest oxygen saturation was 82.8%.  Time spent less than or equal to 88% was 4.9 minutes.  Time spent less than or equal to 89% was 7.3 minutes.       The final  pressure was 7 cmH2O with an AHI of 0.8 events per hour.  Time in REM supine on final pressure was 0 minutes. No consolidated sleep was seen in the supine position.  This titration was considered adequate (residual AHII with 75% decrease, or above constraints without REM-supine sleep at final pressure).    Periodic Limb Movements    During the diagnostic portion of the study, there were 39 PLMs recorded. The PLM index was 18.2 movements per hour.  The PLM Arousal Index was 0.5 per hour.    During the treatment portion of the study, there were 70 PLMs recorded. The PLM index was 19.3 movements per hour.  The PLM Arousal Index was - per hour.      Overall, the patient rates their experience with PAP as 8 (0 poor, 10 great). The mask is comfortable. The mask is leaking, 2 nights per week. They are not snoring with the mask  on. They are not having gasp arousals.  They are having significant oral/nasal dryness. The pressure settings are comfortable.     Patient uses full-face mask.    Bedtime is typically 9-10 pm. Usually it takes about 10 minutes to fall asleep with the mask on. Wake time is typically 4 am.  Patient is using PAP therapy 5-6 hours per night. The patient is usually getting 6-7 hours of sleep per night.    Patient does feel more rested in the morning with CPAP.    Halma Sleepiness Scale: 8/24    ResMed   Auto-PAP 6-18 cmH2O download:  20 total days of use. 10 nonuse days. 57% days with >4 hours use.  Average use 5 hours 42 minutes per day. Median Leak 7.9 L/min. 95%ile Leak 19 L/min. CPAP 95% pressure 12.2cm. AHI 6.5 (KARISSA 1.7,  3%)    Use is good. Last 21 days: 19/21 days of use, and 17/21 > 4 hours.     Past medical/surgical history, family history, social history, medications and allergies were reviewed.      Problem List:  Patient Active Problem List    Diagnosis Date Noted     CHANTEL (obstructive sleep apnea)- severe (AHI 30) 10/06/2016     Priority: Medium     Study Date: 10/03/2016- (196.0 lbs) apnea/hypopnea index 30.8. REM AHI 40,  supine AHI n/a. RDI  33.1 . Lowest oxygen saturation 82.8%.  Time spent less than or equal to 88% 4.9 minutes.  Time spent less than or equal to 89% 7.3 minutes. CPAP final  pressure 7 cmH2O with AHI of 0.8.  Time in REM supine on final pressure 0 minutes. No consolidated sleep seen in supine position. During diagnostic portion of study, PLM index 18.2. During treatment portion of study,  PLM index 19.3.             Moderate nonproliferative diabetic retinopathy, without macular edema, associated with type 2 diabetes mellitus 08/16/2016     Priority: Medium     Cortical cataract of both eyes 08/15/2016     Priority: Medium     Secondary renal hyperparathyroidism (H) 07/26/2016     Priority: Medium     Proteinuria 03/18/2016     Priority: Medium     Vitamin D deficiency 03/18/2016      Priority: Medium     OA (osteoarthritis) of knee 03/18/2016     Priority: Medium     Chondromalacia of patella, unspecified laterality 03/18/2016     Priority: Medium     Pain in joint of left shoulder 03/18/2016     Priority: Medium     Tinnitus 03/18/2016     Priority: Medium     left        Ptosis of right eyelid 03/18/2016     Priority: Medium     Chronic systolic heart failure (H)      Priority: Medium     Echo 7/2015 LVEF 18%       Automatic implantable cardioverter-defibrillator in situ 08/18/2015     Priority: Medium     Health Care Home 01/19/2015     Priority: Medium     *See Letters for [facility] Care Plan: Emergency Care Plan         CKD (chronic kidney disease) stage 3, GFR 30-59 ml/min 01/28/2013     Priority: Medium     CHF (NYHA class II, ACC/AHA stage C) (H) 08/15/2012     Priority: Medium     Advanced directives, counseling/discussion 06/01/2011     Priority: Medium     Advance Care Planning:   ACP Review and Resources Provided:  Reviewed chart for advance care plan.  Lucian JULITO Oliveira has no plan or code status on file. Discussed available resources and provided with information. Confirmed code status reflects current choices pending further ACP discussions.  Confirmed/documented designated decision maker(s). See permanent comments section of demographics in clinical tab.   Added by Johanny Canela on 6/19/2013  Discussed advance care planning with patient; information given to patient to review. 6/1/2011        Hypertension goal BP (blood pressure) < 140/90 01/21/2011     Priority: Medium     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) 10/31/2010     Priority: Medium     Goal <8%       Hyperlipidemia LDL goal <100 10/31/2010     Priority: Medium     CAD (coronary artery disease) 03/15/2010     Priority: Medium     MI and open heart with 1 stent placed in 2008; another minor MI in 2009.  MI on 2/19/15. Coronary angi[facility] from [facility] on 2/19/15 showed severe 3  "vessel native CAD (all three vessels [LAD, LCx and RCA] reported to be 100% ocludded at their ostia). All his grafts were considered to be occluded except for LIMA-to-LAD, which was patent and supplied left-to-left, as well as, left-to-right collaterals. There were no targets suitable for revascularization and patient was put on medical therapy.        Non morbid obesity due to excess calories 09/26/2016     Priority: Low        /82 mmHg  Pulse 76  Ht 1.651 m (5' 5\")  Wt 86.637 kg (191 lb)  BMI 31.78 kg/m2  SpO2 97%        Impression/Plan:     Severe Sleep apnea.  Tolerating PAP better npow that he is turning it on. Daytime symptoms are improved.  -Change pressures to 8-15 cm     was not available, was garland Oliveira will follow up in about 1 year(s).     Fifteen minutes spent with patient, all of which were spent face-to-face counseling, consulting, coordinating plan of care.        "

## 2017-01-11 NOTE — PATIENT INSTRUCTIONS

## 2017-01-12 DIAGNOSIS — I50.22 CHRONIC SYSTOLIC HEART FAILURE (H): ICD-10-CM

## 2017-01-12 LAB
ANION GAP SERPL CALCULATED.3IONS-SCNC: 7 MMOL/L (ref 3–14)
BUN SERPL-MCNC: 30 MG/DL (ref 7–30)
CALCIUM SERPL-MCNC: 8.9 MG/DL (ref 8.5–10.1)
CHLORIDE SERPL-SCNC: 103 MMOL/L (ref 94–109)
CO2 SERPL-SCNC: 27 MMOL/L (ref 20–32)
CREAT SERPL-MCNC: 1.86 MG/DL (ref 0.66–1.25)
GFR SERPL CREATININE-BSD FRML MDRD: 37 ML/MIN/1.7M2
GLUCOSE SERPL-MCNC: 120 MG/DL (ref 70–99)
POTASSIUM SERPL-SCNC: 4.7 MMOL/L (ref 3.4–5.3)
SODIUM SERPL-SCNC: 137 MMOL/L (ref 133–144)

## 2017-01-12 PROCEDURE — 36415 COLL VENOUS BLD VENIPUNCTURE: CPT | Performed by: NURSE PRACTITIONER

## 2017-01-12 PROCEDURE — 80048 BASIC METABOLIC PNL TOTAL CA: CPT | Performed by: NURSE PRACTITIONER

## 2017-01-14 DIAGNOSIS — I50.22 CHRONIC SYSTOLIC HEART FAILURE (H): Primary | ICD-10-CM

## 2017-01-14 NOTE — PROGRESS NOTES
Date: 1/14/2017  Time of Call: 8:43 AM  Diagnosis:  Heart failure  VORB - Ordering provider: Cassandra Mcgovern NP   Order: BMP 2/20/17  Order received by: Lucille Armendariz RN   Follow-up/additional notes:

## 2017-02-07 ENCOUNTER — OFFICE VISIT (OUTPATIENT)
Dept: FAMILY MEDICINE | Facility: CLINIC | Age: 62
End: 2017-02-07
Payer: COMMERCIAL

## 2017-02-07 ENCOUNTER — TELEPHONE (OUTPATIENT)
Dept: PEDIATRICS | Facility: CLINIC | Age: 62
End: 2017-02-07

## 2017-02-07 VITALS
WEIGHT: 193.2 LBS | BODY MASS INDEX: 32.15 KG/M2 | TEMPERATURE: 98.1 F | SYSTOLIC BLOOD PRESSURE: 112 MMHG | HEART RATE: 74 BPM | DIASTOLIC BLOOD PRESSURE: 75 MMHG | OXYGEN SATURATION: 97 %

## 2017-02-07 DIAGNOSIS — Z12.11 COLON CANCER SCREENING: ICD-10-CM

## 2017-02-07 DIAGNOSIS — Z79.4 TYPE 2 DIABETES MELLITUS WITH DIABETIC NEPHROPATHY, WITH LONG-TERM CURRENT USE OF INSULIN (H): Primary | Chronic | ICD-10-CM

## 2017-02-07 DIAGNOSIS — I25.810 CORONARY ARTERY DISEASE INVOLVING OTHER CORONARY ARTERY BYPASS GRAFT, ANGINA PRESENCE UNSPECIFIED: Chronic | ICD-10-CM

## 2017-02-07 DIAGNOSIS — E11.21 TYPE 2 DIABETES MELLITUS WITH DIABETIC NEPHROPATHY, WITH LONG-TERM CURRENT USE OF INSULIN (H): Primary | Chronic | ICD-10-CM

## 2017-02-07 LAB — HBA1C MFR BLD: 9.8 % (ref 4.3–6)

## 2017-02-07 PROCEDURE — 36415 COLL VENOUS BLD VENIPUNCTURE: CPT | Performed by: PHYSICIAN ASSISTANT

## 2017-02-07 PROCEDURE — 83036 HEMOGLOBIN GLYCOSYLATED A1C: CPT | Performed by: PHYSICIAN ASSISTANT

## 2017-02-07 PROCEDURE — 99213 OFFICE O/P EST LOW 20 MIN: CPT | Performed by: PHYSICIAN ASSISTANT

## 2017-02-07 RX ORDER — INSULIN GLARGINE 100 [IU]/ML
23 INJECTION, SOLUTION SUBCUTANEOUS AT BEDTIME
Qty: 18 ML | Refills: 0 | Status: SHIPPED | OUTPATIENT
Start: 2017-02-07 | End: 2017-04-28

## 2017-02-07 NOTE — MR AVS SNAPSHOT
After Visit Summary   2/7/2017    Lucian Oliveira    MRN: [id number]           Patient Information     Date Of Birth          1955        Visit Information        Provider Department      2/7/2017 7:30 AM Anna Archuleta PA-C; [employer] [facility]        Today's Diagnoses     Type 2 diabetes mellitus with diabetic nephropathy, with long-term current use of insulin (H)    -  1     Colon cancer screening           Care Instructions    Increase your insulin dose to 23 units per night.  Continue monitoring your blood sugars. Check your blood sugar in the morning (fasting), 2 hours after lunch, and 2 hours after dinner. Record these number so I can review them. Bring your blood sugar log to our follow up in 2 months.    Aumente lainez dosis de insulina a 23 unidades por noche. Continúe monitoreando kely azúcares en la sparkle. Compruebe lainez nivel de azúcar en la sparkle por la mañana (ayuno), 2 horas después del almuerzo y 2 horas después de la jesusita. Registre estos números para poder revisarlos. Traiga lainez registro de azúcar en la sparkle a nuestro seguimiento en 2 meses.          Follow-ups after your visit        Your next 10 appointments already scheduled     Feb 20, 2017  7:45 AM   LAB with  LAB   [facility] ([address] [address]   [phone]           Patient must bring picture ID.  Patient should be prepared to give a urine specimen  Please do not eat 10-12 hours before your appointment if you are coming in fasting for labs on lipids, cholesterol, or glucose (sugar).  Pregnant women should follow their Care Team instructions. Water with medications is okay. Do not drink coffee or other fluids.   If you have concerns about taking  your medications, please ask at office or if scheduling via BuyBox, send a message by clicking on Secure Messaging, Message Your Care Team.            Apr 25, 2017  7:45  AM   LAB with FK LAB   [facility] ([facility])    [address] [place] [address]   [phone]           Patient must bring picture ID.  Patient should be prepared to give a urine specimen  Please do not eat 10-12 hours before your appointment if you are coming in fasting for labs on lipids, cholesterol, or glucose (sugar).  Pregnant women should follow their Care Team instructions. Water with medications is okay. Do not drink coffee or other fluids.   If you have concerns about taking  your medications, please ask at office or if scheduling via PureCarshart, send a message by clicking on Secure Messaging, Message Your Care Team.            Apr 26, 2017 10:30 AM   Return Visit with Cassandra Mcgovern NP   [facility] PHYSICIANS HEART AT [facility] ([facility])    [address] [place] [address] 2nd Floor  [facility] [address]   [phone]            Jun 05, 2017 10:00 AM   LAB with ACUTE CARE LAB UU   [facility] ([facility])    [address] [facility] [address]              Patient must bring picture ID.  Patient should be prepared to give a urine specimen  Please do not eat 10-12 hours before your appointment if you are coming in fasting for labs on lipids, cholesterol, or glucose (sugar).  Pregnant women should follow their Care Team instructions. Water with medications is okay. Do not drink coffee or other fluids.   If you have concerns about taking  your medications, please ask at office or if scheduling via FlagTapt, send a message by clicking on Secure Messaging, Message Your Care Team.            Jun 05, 2017 10:30 AM   Card Cardpul Stress Tst Adult with UUEKGS   [facility] ([facility], [facility])    [address] [facility] [address]               Jun 05, 2017  1:00 PM   Ech Coleman with UUETEER1   [facility],  Echocardiography ([facility], [facility])    [address] [facility] [address]   [phone]           1.  Please bring or wear a comfortable two-piece outfit. 2.  Arrival time: -   [facility]:  arrive 75 minutes prior to examination time. -   [facility]:  arrive 90 minutes prior to examination time. -   [facility]:   arrive 15 minutes prior to examination time. 3.  Plan to have someone here to drive you home after the test. -   Someone should stay with you for 6 hours after your test. 4.  No food or drink: -   6 hours before the test 5.  If you take antacids or water pills (diuretics): Do not take them until after your test. You may take blood pressure medicine with a few sips of water. 6.  If you have diabetes: -   Morning slots preferred -   If you take insulin, call your diabetes care team. Ask if you should take a   dose the morning of your test. -   If you take diabetes medicine by mouth, don't take it on the morning of your test. Bring it with you to take after the test. (If you have questions, call your diabetes care team.) 7.  Bring a list of any medicines you are taking. 8.  Do not drive for 24 hours after the test. 9.  For any questions that cannot be answered, please contact the ordering physician            Jun 05, 2017  3:00 PM   (Arrive by 2:45 PM)   Implanted Defibulator with Uc Cv Device 1   [facility] ([facility])    [address] [place] [address] [phone]            Jun 05, 2017  3:30 PM   (Arrive by 3:15 PM)   RETURN HEART FAILURE with Arvin Sheridan MD   [facility])    [address] [place] [id number] [phone]              Future tests that were ordered for you today     Open Future Orders        Priority Expected Expires Ordered    Fecal colorectal cancer screen (FIT) Routine 2/28/2017 5/2/2017 2/7/2017            Who to contact      Normal or non-critical lab and imaging results will be communicated to you by My Team Zonehart, letter or phone within 4 business days after the clinic has received the results. If you do not hear from us within 7 days, please contact the clinic through StashMetricst or phone. If you have a critical or abnormal lab result, we will notify you by phone as soon as possible.  Submit refill requests through Conekta or call your pharmacy and they will forward the refill request to us. Please allow 3 business days for your refill to be completed.          If you need to speak with a  for additional information , please call: [phone]             Additional Information About Your Visit        Conekta Information     Conekta gives you secure access to your electronic health record. If you see a primary care provider, you can also send messages to your care team and make appointments. If you have questions, please call your primary care clinic.  If you do not have a primary care provider, please call [phone] and they will assist you.        Care EveryWhere ID     This is your Care EveryWhere ID. This could be used by other organizations to access your [place] medical records  [id number]        Your Vitals Were     Pulse Temperature Pulse Oximetry             74 98.1  F (36.7  C) (Oral) 97%          Blood Pressure from Last 3 Encounters:   02/07/17 112/75   01/11/17 127/82   01/05/17 106/68    Weight from Last 3 Encounters:   02/07/17 193 lb 3.2 oz (87.635 kg)   01/11/17 191 lb (86.637 kg)   01/05/17 190 lb (86.183 kg)              We Performed the Following     Hemoglobin A1c          Today's Medication Changes          These changes are accurate as of: 2/7/17  7:44 AM.  If you have any questions, ask your nurse or doctor.               These medicines have changed or have updated prescriptions.        Dose/Directions    insulin glargine 100 UNIT/ML injection   This may have changed:  Another medication with the  same name was removed. Continue taking this medication, and follow the directions you see here.   Used for:  Type 2 diabetes mellitus with diabetic nephropathy, with long-term current use of insulin (H)   Changed by:  Anna Archuleta PA-C        Dose:  23 Units   Inject 23 Units Subcutaneous At Bedtime   Quantity:  18 mL   Refills:  0            Where to get your medicines      These medications were sent to [facility] [place]  [place], [place] - [address]  [address], [place] [address]     Phone:  [phone]    - insulin glargine 100 UNIT/ML injection             Primary Care Provider Office Phone # Fax #    Anna Archuleta PA-C [phone]       [facility] [address] [address]        Thank you!     Thank you for choosing [facility]  for your care. Our goal is always to provide you with excellent care. Hearing back from our patients is one way we can continue to improve our services. Please take a few minutes to complete the written survey that you may receive in the mail after your visit with us. Thank you!             Your Updated Medication List - Protect others around you: Learn how to safely use, store and throw away your medicines at www.disposemymeds.org.          This list is accurate as of: 2/7/17  7:44 AM.  Always use your most recent med list.                   Brand Name Dispense Instructions for use    albuterol 108 (90 BASE) MCG/ACT Inhaler    PROAIR HFA/PROVENTIL HFA/VENTOLIN HFA    1 Inhaler    Inhale 2 puffs into the lungs 3 times daily       aspirin 81 MG tablet     30 tablet    Take 1 tablet (81 mg) by mouth daily       atorvastatin 40 MG tablet    LIPITOR    90 tablet    Take 1 tablet (40 mg) by mouth daily       blood glucose calibration solution     1 Bottle    Use to calibrate blood glucose monitor as directed.       blood glucose monitoring lancets     1 Box    Use to test blood sugars    times daily or as directed.       blood glucose monitoring meter device kit     1 kit    Use to test blood sugar 2 times daily, as directed.       blood glucose monitoring test strip    ONE TOUCH ULTRA    3 Month    Use to test blood sugars 2-3 times daily or as directed.       carvedilol 25 MG tablet    COREG    180 tablet    Take 1 tablet (25 mg) by mouth 2 times daily (with meals)       clopidogrel 75 MG tablet    PLAVIX    90 tablet    Take 1 tablet (75 mg) by mouth daily       furosemide 40 MG tablet    LASIX    180 tablet    Take 1 tablet (40 mg) by mouth daily Take an extra 40mg as needed if weight goes up 2# overnight, or more than 5# in 1 week.       glipiZIDE 5 MG tablet    GLUCOTROL    180 tablet    Take 1 tablet (5 mg) by mouth 2 times daily (before meals)       insulin glargine 100 UNIT/ML injection     18 mL    Inject 23 Units Subcutaneous At Bedtime       insulin pen needle 31G X 5 MM     100 each    Use one needle daily, as directed       isosorbide mononitrate 30 MG 24 hr tablet    IMDUR    45 tablet    Take 1 tablet (30 mg) by mouth daily       losartan 50 MG tablet    COZAAR    135 tablet    Take 1 tablet (50 mg) by mouth daily       nitroglycerin 0.4 MG sublingual tablet    NITROSTAT    10 tablet    Place 1 tablet (0.4 mg) under the tongue every 5 minutes as needed for chest pain If you are still having symptoms after 3 doses (15 minutes) call 911.       order for DME      Equipment being ordered: {Select DME item(s) to order:436079}       order for DME     1 Units    Auto CPAP 8-15 cmH20       spironolactone 25 MG tablet    ALDACTONE    45 tablet    Take 0.5 tablets (12.5 mg) by mouth daily

## 2017-02-07 NOTE — TELEPHONE ENCOUNTER
Good morning Anna Epstein came into the pharmacy this morning and his lancing device does not work.  I don't have any separate lancing devices on site but I do have meters that come with one.  Could you send a prescription for a new meter so I can get the patient a lancing device today.  His is broken and does not have a back up to test today.    Thank you  Maribell Barros [facility]  Phone: [phone]  Fax: [phone]

## 2017-02-07 NOTE — PROGRESS NOTES
SUBJECTIVE:                                                    Lucian Oliveira is a 61 year old male who presents to clinic today for the following health issues:    Diabetes Follow-up    Patient is checking blood sugars: three times daily.   Blood sugar testing frequency justification: Uncontrolled diabetes  Results are as follows:         am - 150         lunchtime - 180         bedtime - 170-190    Diabetic concerns: None     Symptoms of hypoglycemia (low blood sugar): none     Paresthesias (numbness or burning in feet) or sores: No     Date of last diabetic eye exam: 8/15/16    Using 20 units of insulin daily         Amount of exercise or physical activity: None    Problems taking medications regularly: No    Medication side effects: none    Diet: regular (no restrictions)      Problem list and histories reviewed & adjusted, as indicated.  Additional history: as documented    Patient Active Problem List   Diagnosis     CAD (coronary artery disease)     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)     Hyperlipidemia LDL goal <100     Hypertension goal BP (blood pressure) < 140/90     Advanced directives, counseling/discussion     CHF (NYHA class II, ACC/AHA stage C) (H)     CKD (chronic kidney disease) stage 3, GFR 30-59 ml/min     Health Care Home     Automatic implantable cardioverter-defibrillator in situ     Chronic systolic heart failure (H)     Proteinuria     Vitamin D deficiency     OA (osteoarthritis) of knee     Chondromalacia of patella, unspecified laterality     Pain in joint of left shoulder     Tinnitus     Ptosis of right eyelid     Secondary renal hyperparathyroidism (H)     Cortical cataract of both eyes     Moderate nonproliferative diabetic retinopathy, without macular edema, associated with type 2 diabetes mellitus     Non morbid obesity due to excess calories     CHANTEL (obstructive sleep apnea)- severe (AHI 30)     Past Surgical History   Procedure Laterality Date     C cabg,  artery-vein, three  02/2008     Implant automatic implantable cardioverter defibrillator         Social History   Substance Use Topics     Smoking status: Never Smoker      Smokeless tobacco: Never Used      Comment: Never smoked; non-smoking household     Alcohol Use: No      Comment: rare use     Family History   Problem Relation Age of Onset     DIABETES Brother      DIABETES Sister      DIABETES Sister      Macular Degeneration No family hx of      Glaucoma No family hx of      Myocardial Infarction No family hx of      KIDNEY DISEASE No family hx of            ROS:  Constitutional, endocrine systems are negative, except as otherwise noted.    OBJECTIVE:                                                    /75 mmHg  Pulse 74  Temp(Src) 98.1  F (36.7  C) (Oral)  Wt 193 lb 3.2 oz (87.635 kg)  SpO2 97%  Body mass index is 32.15 kg/(m^2).  GENERAL APPEARANCE: alert, no distress and over weight  MS: extremities normal- no gross deformities noted  PSYCH: mentation appears normal and affect normal/bright    Diagnostic test results:  Diagnostic Test Results:  Results for orders placed or performed in visit on 02/07/17 (from the past 24 hour(s))   Hemoglobin A1c   Result Value Ref Range    Hemoglobin A1C 9.8 (H) 4.3 - 6.0 %        ASSESSMENT:                                                      1. Type 2 diabetes mellitus with diabetic nephropathy, with long-term current use of insulin (H)    2. Colon cancer screening         PLAN:                                                    Discussed the importance of following directions in which he is given in the clinic.    Patient Instructions   Increase your insulin dose to 23 units per night.  Continue monitoring your blood sugars. Check your blood sugar in the morning (fasting), 2 hours after lunch, and 2 hours after dinner. Record these number so I can review them. Bring your blood sugar log to our follow up in 2 months.    Aumente lainez dosis de insulina a 23  unidades por noche. Continúe monitoreando kely azúcares en la sparkle. Compruebe lainez nivel de azúcar en la sparkle por la mañana (ayuno), 2 horas después del almuerzo y 2 horas después de la jesusita. Registre estos números para poder revisarlos. Traiga lainez registro de azúcar en la sparkle a nuestro seguimiento en 2 meses.         Anna Archuleta PA-C  [facility]

## 2017-02-07 NOTE — NURSING NOTE
"Chief Complaint   Patient presents with     Recheck Medication       Initial /75 mmHg  Pulse 74  Temp(Src) 98.1  F (36.7  C) (Oral)  Wt 193 lb 3.2 oz (87.635 kg)  SpO2 97% Estimated body mass index is 32.15 kg/(m^2) as calculated from the following:    Height as of 1/11/17: 5' 5\" (1.651 m).    Weight as of this encounter: 193 lb 3.2 oz (87.635 kg).  Medication Reconciliation: complete   Ynes Fam MA      "

## 2017-02-07 NOTE — PATIENT INSTRUCTIONS
Increase your insulin dose to 23 units per night.  Continue monitoring your blood sugars. Check your blood sugar in the morning (fasting), 2 hours after lunch, and 2 hours after dinner. Record these number so I can review them. Bring your blood sugar log to our follow up in 2 months.    Aumente lainez dosis de insulina a 23 unidades por noche. Continúe monitoreando kely azúcares en la sparkle. Compruebe lainez nivel de azúcar en la sparkle por la mañana (ayuno), 2 horas después del almuerzo y 2 horas después de la jesusita. Registre estos números para poder revisarlos. Traiga lainez registro de azúcar en la sparkle a nuestro seguimiento en 2 meses.

## 2017-02-08 ENCOUNTER — ALLIED HEALTH/NURSE VISIT (OUTPATIENT)
Dept: CARDIOLOGY | Facility: CLINIC | Age: 62
End: 2017-02-08
Attending: INTERNAL MEDICINE
Payer: COMMERCIAL

## 2017-02-08 DIAGNOSIS — I50.22 CHRONIC SYSTOLIC HEART FAILURE (H): Primary | Chronic | ICD-10-CM

## 2017-02-08 PROCEDURE — 93296 REM INTERROG EVL PM/IDS: CPT | Mod: ZF

## 2017-02-08 PROCEDURE — 93295 DEV INTERROG REMOTE 1/2/MLT: CPT | Performed by: INTERNAL MEDICINE

## 2017-02-08 NOTE — PROGRESS NOTES
Pt sent in a routine remote ICD check for evaluation per MD order.  His ICD check shows 9 NSVT episodes 147-181 bpm and 7-17 sec.  He is RV pacing 0% of the time, his heart rate histograms show good heart rate variation and his ICD battery estimates 12 years left.  Pt was called with the results and he reports feeling well and he does not offer any complaints to me. We will plan to see him back in clinic on 6/5/17 when he returns to see Dr Anne.    Remote ICD

## 2017-02-20 ENCOUNTER — TELEPHONE (OUTPATIENT)
Dept: FAMILY MEDICINE | Facility: CLINIC | Age: 62
End: 2017-02-20

## 2017-02-20 DIAGNOSIS — E11.21 TYPE 2 DIABETES MELLITUS WITH DIABETIC NEPHROPATHY (H): ICD-10-CM

## 2017-02-20 DIAGNOSIS — I50.22 CHRONIC SYSTOLIC HEART FAILURE (H): ICD-10-CM

## 2017-02-20 DIAGNOSIS — E11.9 DIABETES MELLITUS, TYPE 2 (H): ICD-10-CM

## 2017-02-20 LAB
ANION GAP SERPL CALCULATED.3IONS-SCNC: 6 MMOL/L (ref 3–14)
BUN SERPL-MCNC: 38 MG/DL (ref 7–30)
CALCIUM SERPL-MCNC: 9.3 MG/DL (ref 8.5–10.1)
CHLORIDE SERPL-SCNC: 103 MMOL/L (ref 94–109)
CO2 SERPL-SCNC: 28 MMOL/L (ref 20–32)
CREAT SERPL-MCNC: 1.81 MG/DL (ref 0.66–1.25)
GFR SERPL CREATININE-BSD FRML MDRD: 38 ML/MIN/1.7M2
GLUCOSE SERPL-MCNC: 168 MG/DL (ref 70–99)
POTASSIUM SERPL-SCNC: 4.8 MMOL/L (ref 3.4–5.3)
SODIUM SERPL-SCNC: 137 MMOL/L (ref 133–144)

## 2017-02-20 PROCEDURE — 36415 COLL VENOUS BLD VENIPUNCTURE: CPT | Performed by: NURSE PRACTITIONER

## 2017-02-20 PROCEDURE — 80048 BASIC METABOLIC PNL TOTAL CA: CPT | Performed by: NURSE PRACTITIONER

## 2017-02-20 NOTE — TELEPHONE ENCOUNTER
Lancets      Last Written Prescription Date: 11-  Last Fill Quantity: 1 box,  # refills: PRN   Last Office Visit with [facility], P or  Health prescribing provider: 02-                                         Next 5 appointments (look out 90 days)     Apr 26, 2017 10:30 AM CDT   Return Visit with Cassandra Mcgovern NP   [facility] PHYSICIANS HEART AT [facility] ([facility])    [address] [place] [address] [phone]            Apr 27, 2017  7:40 AM CDT   Office Visit with Anna Archuleta PA-C   [facility] ([facility])    [address] [place] [address] [phone]                  If this refill is appropriate for the patient, please send a new RX; if this is not appropriate or the patient needs to be seen, please call the patient.  Thank you  Maribell Barros CPht    [facility]  Phone: [phone]  Fax: [phone]

## 2017-02-21 ENCOUNTER — CARE COORDINATION (OUTPATIENT)
Dept: CARDIOLOGY | Facility: CLINIC | Age: 62
End: 2017-02-21

## 2017-02-21 NOTE — PROGRESS NOTES
"I called Lucian with an  today to discuss his lab results and how he was feeling.  Lab results are stable from previous check. Lucian states he's \"doing good\" and feeling well.  Denies any difficulties with his breathing, denies SOB or new edema.  Confirmed his follow-up CORE appt in April.  Reviewed that he should call in the meantime if he is not feeling well. Lucian verbalizes understanding and agrees with plan of care. Lucille Armendariz RN    "

## 2017-03-16 ENCOUNTER — DOCUMENTATION ONLY (OUTPATIENT)
Dept: SLEEP MEDICINE | Facility: CLINIC | Age: 62
End: 2017-03-16

## 2017-03-16 NOTE — PROGRESS NOTES
STM Recheck Visit     Message left for patient to return call    Assessment: Pt not meeting objective benchmarks for compliance     Action plan: Waiting for patient to return call.

## 2017-04-14 DIAGNOSIS — I50.9 CHF (NYHA CLASS II, ACC/AHA STAGE C) (H): ICD-10-CM

## 2017-04-14 DIAGNOSIS — N18.30 CKD (CHRONIC KIDNEY DISEASE) STAGE 3, GFR 30-59 ML/MIN (H): ICD-10-CM

## 2017-04-14 DIAGNOSIS — I25.739 CORONARY ARTERY DISEASE INVOLVING NONAUTOLOGOUS BIOLOGICAL CORONARY BYPASS GRAFT WITH ANGINA PECTORIS (H): ICD-10-CM

## 2017-04-14 DIAGNOSIS — R06.2 WHEEZING: ICD-10-CM

## 2017-04-14 DIAGNOSIS — I50.22 CHRONIC SYSTOLIC HEART FAILURE (H): ICD-10-CM

## 2017-04-14 NOTE — TELEPHONE ENCOUNTER
Spironolactone      Last Written Prescription Date: 03/16/17  Last Fill Quantity: 45, # refills: 3  Last Office Visit with [facility], [facility] or [facility] prescribing provider: 02/07/17  Next 5 appointments (look out 90 days)     Apr 26, 2017 10:30 AM CDT   Return Visit with Cassandra Mcgovern NP   [facility] PHYSICIANS HEART AT [facility] ([facility])    [address] [place] [address]   [phone]            Apr 27, 2017  7:40 AM CDT   Office Visit with Anna Archuleta PA-C   [facility] [place] ([facility] [place])    [address]  [place] [address] [phone]                   Potassium   Date Value Ref Range Status   02/20/2017 4.8 3.4 - 5.3 mmol/L Final     Creatinine   Date Value Ref Range Status   02/20/2017 1.81 (H) 0.66 - 1.25 mg/dL Final     BP Readings from Last 3 Encounters:   02/07/17 112/75   01/11/17 127/82   01/05/17 106/68       Clopidogrel           Last Written Prescription Date: 03/16/17  Last Fill Quantity: 90, # refills: 3    Last Office Visit with [facility], [facility] or [facility] prescribing provider:  02/07/17   Future Office Visit:    Next 5 appointments (look out 90 days)     Apr 26, 2017 10:30 AM CDT   Return Visit with Cassandra Mcgovern NP   [facility] PHYSICIANS HEART AT [facility] ([facility])    [address] [place] [address]   [phone]            Apr 27, 2017  7:40 AM CDT   Office Visit with Anna Archuleta PA-C   [facility] [place] ([facility] [place])    [address]  [place] [address] [phone]                   Lab Results   Component Value Date    WBC 7.6 07/06/2016     Lab Results   Component Value Date    RBC 4.33 07/06/2016     Lab Results   Component Value Date    HGB 13.6 07/06/2016     Lab Results   Component Value Date    HCT 39.6 07/06/2016     No components found for: MCT  Lab Results   Component Value Date    MCV  92 07/06/2016     Lab Results   Component Value Date    MCH 31.4 07/06/2016     Lab Results   Component Value Date    MCHC 34.3 07/06/2016     Lab Results   Component Value Date    RDW 13.8 07/06/2016     Lab Results   Component Value Date     07/06/2016     Lab Results   Component Value Date    AST 16 03/18/2016     Lab Results   Component Value Date    ALT 19 03/18/2016     Creatinine   Date Value Ref Range Status   02/20/2017 1.81 (H) 0.66 - 1.25 mg/dL Final   ]    Isosorbide mononitrate      Last Written Prescription Date: 03/16/17  Last Fill Quantity: 45,  # refills: 3   Last Office Visit with [facility], [facility] or [facility] prescribing provider: 02/07/17                                         Next 5 appointments (look out 90 days)     Apr 26, 2017 10:30 AM CDT   Return Visit with Cassandra Mcgovern NP   [facility] PHYSICIANS HEART AT [facility] ([facility])    [address] [place] [address]   [phone]            Apr 27, 2017  7:40 AM CDT   Office Visit with Anna Archuleta PA-C   [facility] [place] ([facility] [place])    [address] [facility]  [place] [address]   [phone]                  Atorvastatin     Last Written Prescription Date: 03/16/17  Last Fill Quantity: 90, # refills: 3   Last Office Visit with [facility], [facility] or [facility] prescribing provider: 02/07/17  Next 5 appointments (look out 90 days)     Apr 26, 2017 10:30 AM CDT   Return Visit with Cassandra Mcgovern NP   [facility] PHYSICIANS HEART AT [facility] ([facility])    [address] [place] [address]   [phone]            Apr 27, 2017  7:40 AM CDT   Office Visit with Anna Archuleta PA-C   [facility] [place] ([facility] [place])    [address]  [place] [address]   [phone]                   Lab Results   Component Value Date    CHOL 99 07/06/2016     Lab Results   Component Value Date     HDL 31 07/06/2016     Lab Results   Component Value Date    LDL 23 07/06/2016     Lab Results   Component Value Date    TRIG 225 07/06/2016     Lab Results   Component Value Date    CHOLHDLRATIO 2.6 06/27/2015

## 2017-04-17 RX ORDER — SPIRONOLACTONE 25 MG/1
TABLET ORAL
Qty: 45 TABLET | Refills: 3 | Status: SHIPPED | OUTPATIENT
Start: 2017-04-17 | End: 2017-09-06

## 2017-04-17 RX ORDER — ATORVASTATIN CALCIUM 40 MG/1
TABLET, FILM COATED ORAL
Qty: 90 TABLET | Refills: 3 | Status: SHIPPED | OUTPATIENT
Start: 2017-04-17 | End: 2018-04-20

## 2017-04-17 RX ORDER — CLOPIDOGREL BISULFATE 75 MG/1
TABLET ORAL
Qty: 90 TABLET | Refills: 3 | Status: SHIPPED | OUTPATIENT
Start: 2017-04-17 | End: 2017-09-06

## 2017-04-17 RX ORDER — ISOSORBIDE MONONITRATE 30 MG/1
TABLET, EXTENDED RELEASE ORAL
Qty: 45 TABLET | Refills: 3 | Status: SHIPPED | OUTPATIENT
Start: 2017-04-17 | End: 2017-09-06

## 2017-04-18 ENCOUNTER — PRE VISIT (OUTPATIENT)
Dept: CARDIOLOGY | Facility: CLINIC | Age: 62
End: 2017-04-18

## 2017-04-18 DIAGNOSIS — I50.22 CHRONIC SYSTOLIC HEART FAILURE (H): Primary | ICD-10-CM

## 2017-04-25 DIAGNOSIS — I50.22 CHRONIC SYSTOLIC HEART FAILURE (H): ICD-10-CM

## 2017-04-25 LAB
ANION GAP SERPL CALCULATED.3IONS-SCNC: 9 MMOL/L (ref 3–14)
BUN SERPL-MCNC: 31 MG/DL (ref 7–30)
CALCIUM SERPL-MCNC: 8.9 MG/DL (ref 8.5–10.1)
CHLORIDE SERPL-SCNC: 103 MMOL/L (ref 94–109)
CO2 SERPL-SCNC: 26 MMOL/L (ref 20–32)
CREAT SERPL-MCNC: 1.77 MG/DL (ref 0.66–1.25)
GFR SERPL CREATININE-BSD FRML MDRD: 39 ML/MIN/1.7M2
GLUCOSE SERPL-MCNC: 246 MG/DL (ref 70–99)
POTASSIUM SERPL-SCNC: 4.7 MMOL/L (ref 3.4–5.3)
SODIUM SERPL-SCNC: 138 MMOL/L (ref 133–144)

## 2017-04-25 PROCEDURE — 80048 BASIC METABOLIC PNL TOTAL CA: CPT | Performed by: NURSE PRACTITIONER

## 2017-04-25 PROCEDURE — 36415 COLL VENOUS BLD VENIPUNCTURE: CPT | Performed by: NURSE PRACTITIONER

## 2017-04-26 ENCOUNTER — OFFICE VISIT (OUTPATIENT)
Dept: CARDIOLOGY | Facility: CLINIC | Age: 62
End: 2017-04-26
Payer: COMMERCIAL

## 2017-04-26 VITALS
WEIGHT: 194.5 LBS | DIASTOLIC BLOOD PRESSURE: 75 MMHG | BODY MASS INDEX: 32.37 KG/M2 | SYSTOLIC BLOOD PRESSURE: 97 MMHG | HEART RATE: 77 BPM | OXYGEN SATURATION: 97 %

## 2017-04-26 DIAGNOSIS — I50.22 CHRONIC SYSTOLIC HEART FAILURE (H): Primary | ICD-10-CM

## 2017-04-26 PROCEDURE — 99214 OFFICE O/P EST MOD 30 MIN: CPT | Performed by: NURSE PRACTITIONER

## 2017-04-26 NOTE — PATIENT INSTRUCTIONS
Thank you for coming to the [facility] @ [facility];  please note the following instructions:    1.  No medicine changes today    2. Please work on losing weight and consider starting a program like Weight Watchers. Aim to lose 5 pounds by the time you see Dr. Sheridan in June.    3.  We will call you to schedule follow up in September or October, don't hesitate to call us with any concerns    Please continue to follow a low salt diet, limit your water intake to 2 liters per day and continue to exercise. 2 Liters a day = 8.5 cups, or 72 ounces.    If you gain 2# in 24 hours or 5# in one week call Jack Armendariz RN so we can adjust your medications as needed over the phone.    [facility] Cardiology - [place] Location    If you have any questions regarding to your visit please contact your care team:     Cardiology  Telephone Number   Jack BONILLA, Cardiology RN  Evette BIRD, SUZANNE BONILLA, [facility] [phone]; option 1 and then option 3   For scheduling appts:     [phone]           If you need a medication refill please contact your pharmacy.  Please allow 3 business days for your refill to be completed.    As always, Thank you for trusting us with your health care needs!  __________________________________________________________  C.O.R.E. CLINIC Cardiomyopathy, Optimization, Rehabilitation, Education    The C.O.R.E. CLINIC is a heart failure specialty clinic within the [facility] where you will work with specialized nurse practioners dedicated to helping patients with heart failure carefully adjust medications, receive education, and learn who and when to call if symptoms develop.  They specialize in helping you better understand your condition, slow the progression of your disease, improve the length and quality of your life, help you detect future heart problems before they become life threatening, and avoid hospitalizations.

## 2017-04-26 NOTE — PROGRESS NOTES
HPI:    Lucian Oliveira is a pleasant, 61-year-old male with known coronary artery disease status post coronary artery bypass grafting and percutaneous coronary artery intervention in the past, ischemic cardiomyopathy and congestive heart failure, ejection fraction 10%. He has been following in the [facility] along with Dr. Sheridan for ongoing management of congestive heart failure and ongoing evaluation for possible advanced heart failure therapies. He was last seen 3 months ago when he looked well though his renal function had declined. Repeat testing since then were stable. He returns to [facility] for follow up.  Lucian has been more consistently using his CPAP and thinks it's helping. No chest pain, palpitations, lightheadedness, edema, orthopnea, or PND. Appetite is good and no nausea or early satiety. He does struggle with fatigue and his wife notes that he dozes off while watching TV daily.    PAST MEDICAL HISTORY:  Past Medical History:   Diagnosis Date     NO ACTIVE PROBLEMS        FAMILY HISTORY:  Family History   Problem Relation Age of Onset     DIABETES Brother      DIABETES Sister      DIABETES Sister      Macular Degeneration No family hx of      Glaucoma No family hx of      Myocardial Infarction No family hx of      KIDNEY DISEASE No family hx of        SOCIAL HISTORY:  Social History     Social History     Marital Status:      Spouse Name: N/A     Number of Children: 4     Years of Education: N/A     Occupational History      [employer]     Social History Main Topics     Smoking status: Never Smoker      Smokeless tobacco: Never Used      Comment: Never smoked; non-smoking household     Alcohol Use: Yes      Comment: rare use     Drug Use: No     Sexual Activity:     Partners: Female     Other Topics Concern     Parent/Sibling W/ Cabg, Mi Or Angioplasty Before 65f 55m? No     Social History Narrative       CURRENT MEDICATIONS:  Current Outpatient Prescriptions   Medication      spironolactone (ALDACTONE) 25 MG tablet     clopidogrel (PLAVIX) 75 MG tablet     isosorbide mononitrate (IMDUR) 30 MG 24 hr tablet     atorvastatin (LIPITOR) 40 MG tablet     blood glucose monitoring (ACCU-CHEK FELIPE SMARTVIEW) meter device kit     blood glucose monitoring (ONE TOUCH ULTRA) test strip     blood glucose monitoring (ONE TOUCH DELICA) lancets     insulin glargine (BASAGLAR KWIKPEN) 100 UNIT/ML injection     blood glucose monitoring (NO BRAND SPECIFIED) meter device kit     order for DME     order for DME     nitroglycerin (NITROSTAT) 0.4 MG SL tablet     blood glucose calibration (ONETOUCH ULTRA CONTROL) solution     losartan (COZAAR) 50 MG tablet     isosorbide mononitrate (IMDUR) 30 MG 24 hr tablet     furosemide (LASIX) 40 MG tablet     glipiZIDE (GLUCOTROL) 5 MG tablet     carvedilol (COREG) 25 MG tablet     insulin pen needle 31G X 5 MM     albuterol (PROAIR HFA, PROVENTIL HFA, VENTOLIN HFA) 108 (90 BASE) MCG/ACT inhaler     aspirin 81 MG tablet     No current facility-administered medications for this visit.        ROS:   Constitutional: No fever, chills, or sweats. No weight gain/loss.   ENT: No visual disturbance, ear ache, epistaxis, sore throat.   Allergies/Immunologic: Negative.   Respiratory: No cough, hemoptysis.   Cardiovascular: As per HPI.   GI: No nausea, vomiting, hematemesis, melena, or hematochezia.   : No urinary frequency, dysuria, or hematuria.   Integument: Negative.   Psychiatric: Negative.   Neuro: Negative.   Endocrinology: Negative.   Musculoskeletal: Negative.    EXAM:  There were no vitals taken for this visit.  General: appears comfortable, alert and articulate  Head: normocephalic, atraumatic  Eyes: anicteric sclera, EOMI  Neck: no adenopathy  Orophyarynx: moist mucosa, no lesions, dentition intact  Heart: regular, S1/S2, no murmur, gallop, rub, JVP is flat  Lungs: clear, no rales or wheezing  Abdomen: Round, obese, soft, non-tender, bowel sounds present, no  hepatomegaly  Extremities: no clubbing, cyanosis or edema  Neurological: normal speech and affect, no gross motor deficits    Labs:   Last Basic Metabolic Panel:  Lab Results   Component Value Date     04/25/2017      Lab Results   Component Value Date    POTASSIUM 4.7 04/25/2017     Lab Results   Component Value Date    CHLORIDE 103 04/25/2017     Lab Results   Component Value Date    BRYANNA 8.9 04/25/2017     Lab Results   Component Value Date    CO2 26 04/25/2017     Lab Results   Component Value Date    BUN 31 04/25/2017     Lab Results   Component Value Date    CR 1.77 04/25/2017     Lab Results   Component Value Date     04/25/2017         Assessment and Plan:   Mr. Oliveira is a pleasant 61 year old man with a history of ischemic cardiomyopathy and advanced heart failure who looks well from a cardiac standpoint. His renal function has improved since his last visit. We'll make no changes today although he was asked to pursue a structured weight loss program such as Weight Watchers. He has follow up scheduled with Dr. Sheridan including a repeat CPX in roughly 6 weeks away. I asked him to aim to reduce his weight by 5 pounds by then. He will follow up in [facility] clinic ~3-4 months after seeing Dr. Sheridan.  1. Chronic systolic heart failure secondary to ischemic cardiomyopathy  ACE/ARB-Yes  BB-yes  Ald Ant-yes  SCD Prevention: ICD  %BiV: N/A  Volume: Evolemic  Apnea screening. Using CPAP  Follow up: 6 weeks with Dr. Sheridan and [facility] 3-4 months later  2. CAD. On aspirin, beta blocker, and nitroglycerin  3. Chronic kidney disease. Sees Dr. Elaine. Creatinine improved from last visit    25 minutes spent in direct care, of which >50% in counseling    CC  Patient Care Team:  Anna Archuleta PA-C as PCP - General (Physician Assistant)  Lucille Armendariz, RN as Nurse Coordinator (Cardiology)  Cassandra Mcgovern NP as Nurse Practitioner (Cardiology)

## 2017-04-26 NOTE — MR AVS SNAPSHOT
After Visit Summary   4/26/2017    Lucian Oliveira    MRN: [id number]           Patient Information     Date Of Birth          1955        Visit Information        Provider Department      4/26/2017 10:30 AM Cassandra Mcgovern NP; [employer] [facility] PHYSICIANS HEART AT [facility] Instructions    Thank you for coming to the [facility] @ [facility];  please note the following instructions:    1.  No medicine changes today    2. Please work on losing weight and consider starting a program like Weight Watchers. Aim to lose 5 pounds by the time you see Dr. Sheridan in June.    3.  We will call you to schedule follow up in September or October, don't hesitate to call us with any concerns    Please continue to follow a low salt diet, limit your water intake to 2 liters per day and continue to exercise. 2 Liters a day = 8.5 cups, or 72 ounces.    If you gain 2# in 24 hours or 5# in one week call Jack Armendariz RN so we can adjust your medications as needed over the phone.    [facility] Cardiology - [place] Location    If you have any questions regarding to your visit please contact your care team:     Cardiology  Telephone Number   Jack BONILLA, Cardiology RN  Evette BIRD, SUZANNE BONILLA, [facility] [phone]; option 1 and then option 3   For scheduling appts:     [phone]           If you need a medication refill please contact your pharmacy.  Please allow 3 business days for your refill to be completed.    As always, Thank you for trusting us with your health care needs!  __________________________________________________________  C.O.R.E. CLINIC Cardiomyopathy, Optimization, Rehabilitation, Education    The C.O.R.E. CLINIC is a heart failure specialty clinic within the [facility] where you will work with specialized nurse practioners dedicated to helping patients with heart failure carefully adjust medications,  receive education, and learn who and when to call if symptoms develop.  They specialize in helping you better understand your condition, slow the progression of your disease, improve the length and quality of your life, help you detect future heart problems before they become life threatening, and avoid hospitalizations.            Follow-ups after your visit        Your next 10 appointments already scheduled     Apr 27, 2017  7:30 AM CDT   Office Visit with Anna Archuleta PA-C   [facility] ([facility])    [address]  [place] [address] [phone]           Bring a current list of meds and any records pertaining to this visit.  For Physicals, please bring immunization records and any forms needing to be filled out.  Please arrive 10 minutes early to complete paperwork.            Jun 05, 2017 10:00 AM CDT   LAB with ACUTE CARE LAB [facility], Lab ([facility])    [address] [address]              Patient must bring picture ID.  Patient should be prepared to give a urine specimen  Please do not eat 10-12 hours before your appointment if you are coming in fasting for labs on lipids, cholesterol, or glucose (sugar).  Pregnant women should follow their Care Team instructions. Water with medications is okay. Do not drink coffee or other fluids.   If you have concerns about taking  your medications, please ask at office or if scheduling via [employer], send a message by clicking on Secure Messaging, Message Your Care Team.            Jun 05, 2017 10:30 AM CDT   Card Cardpul Stress Tst Adult with UUEKGS   [facility] ([facility])    [address] [address]               Jun 05, 2017  1:00 PM CDT   Ech Coleman with [email or web address]   [facility],  Echocardiography ([facility])    [address] [place] [address]  St  [facility] [address]   [phone]           1.  Please bring or wear a comfortable two-piece outfit. 2.  Arrival time: -   [facility]:  arrive 75 minutes prior to examination time. -   [facility]:  arrive 90 minutes prior to examination time. -   [facility]:   arrive 15 minutes prior to examination time. 3.  Plan to have someone here to drive you home after the test. -   Someone should stay with you for 6 hours after your test. 4.  No food or drink: -   6 hours before the test 5.  If you take antacids or water pills (diuretics): Do not take them until after your test. You may take blood pressure medicine with a few sips of water. 6.  If you have diabetes: -   Morning slots preferred -   If you take insulin, call your diabetes care team. Ask if you should take a   dose the morning of your test. -   If you take diabetes medicine by mouth, don't take it on the morning of your test. Bring it with you to take after the test. (If you have questions, call your diabetes care team.) 7.  Bring a list of any medicines you are taking. 8.  Do not drive for 24 hours after the test. 9.   A responsible adult must stay with you for 24 hours after the test.  10.  For any questions that cannot be answered, please contact the ordering physician            Jun 05, 2017  3:00 PM CDT   (Arrive by 2:45 PM)   Implanted Defibulator with Uc Cv Device 1   [facility] ([facility])    [address] [place] [address] [phone]            Jun 05, 2017  3:30 PM CDT   (Arrive by 3:15 PM)   RETURN HEART FAILURE with Arvin Sheridan MD   [facility] ([facility])    [address] [place] [address] [phone]              Who to contact     If you have questions or need follow up information about today's clinic visit or your schedule please contact [facility] PHYSICIANS HEART AT [place]  directly at [phone].  Normal or non-critical lab and imaging results will be communicated to you by MyChart, letter or phone within 4 business days after the clinic has received the results. If you do not hear from us within 7 days, please contact the clinic through [employer] or phone. If you have a critical or abnormal lab result, we will notify you by phone as soon as possible.  Submit refill requests through imagine or call your pharmacy and they will forward the refill request to us. Please allow 3 business days for your refill to be completed.          Additional Information About Your Visit        [employer] Information     imagine gives you secure access to your electronic health record. If you see a primary care provider, you can also send messages to your care team and make appointments. If you have questions, please call your primary care clinic.  If you do not have a primary care provider, please call [phone] and they will assist you.        Care EveryWhere ID     This is your Care EveryWhere ID. This could be used by other organizations to access your [place] medical records  [id number]        Your Vitals Were     Pulse Pulse Oximetry BMI (Body Mass Index)             77 97% 32.37 kg/m2          Blood Pressure from Last 3 Encounters:   04/26/17 97/75   02/07/17 112/75   01/11/17 127/82    Weight from Last 3 Encounters:   04/26/17 88.2 kg (194 lb 8 oz)   02/07/17 87.6 kg (193 lb 3.2 oz)   01/11/17 86.6 kg (191 lb)              Today, you had the following     No orders found for display       Primary Care Provider Office Phone # Fax #    Anna Archuleta PA-C [phone]       [facility] [place] [address]  [place] [address]        Thank you!     Thank you for choosing [facility] PHYSICIANS HEART AT [facility]  for your care. Our goal is always to provide you with excellent care. Hearing back from our patients is one way we can continue to improve our  services. Please take a few minutes to complete the written survey that you may receive in the mail after your visit with us. Thank you!             Your Updated Medication List - Protect others around you: Learn how to safely use, store and throw away your medicines at www.disposemymeds.org.          This list is accurate as of: 4/26/17 11:06 AM.  Always use your most recent med list.                   Brand Name Dispense Instructions for use    albuterol 108 (90 BASE) MCG/ACT Inhaler    PROAIR HFA/PROVENTIL HFA/VENTOLIN HFA    1 Inhaler    Inhale 2 puffs into the lungs 3 times daily       aspirin 81 MG tablet     30 tablet    Take 1 tablet (81 mg) by mouth daily       atorvastatin 40 MG tablet    LIPITOR    90 tablet    TAKE ONE TABLET BY MOUTH EVERY DAY       blood glucose calibration solution     1 Bottle    Use to calibrate blood glucose monitor as directed.       blood glucose monitoring lancets     1 Box    Use to test blood sugars   times daily or as directed.       * blood glucose monitoring meter device kit    no brand specified    1 kit    Use to test blood sugar 3 times daily or as directed.       * blood glucose monitoring meter device kit     1 kit    Use to test blood sugar 3-4 times daily, as directed.       blood glucose monitoring test strip    ONE TOUCH ULTRA    100 each    Use to test blood sugars 2-3 times daily or as directed.       carvedilol 25 MG tablet    COREG    180 tablet    Take 1 tablet (25 mg) by mouth 2 times daily (with meals)       clopidogrel 75 MG tablet    PLAVIX    90 tablet    TAKE ONE TABLET BY MOUTH EVERY DAY       furosemide 40 MG tablet    LASIX    180 tablet    Take 1 tablet (40 mg) by mouth daily Take an extra 40mg as needed if weight goes up 2# overnight, or more than 5# in 1 week.       glipiZIDE 5 MG tablet    GLUCOTROL    180 tablet    Take 1 tablet (5 mg) by mouth 2 times daily (before meals)       insulin glargine 100 UNIT/ML injection     18 mL    Inject 23 Units  Subcutaneous At Bedtime       insulin pen needle 31G X 5 MM     100 each    Use one needle daily, as directed       * isosorbide mononitrate 30 MG 24 hr tablet    IMDUR    45 tablet    Take 1 tablet (30 mg) by mouth daily       * isosorbide mononitrate 30 MG 24 hr tablet    IMDUR    45 tablet    TAKE ONE-HALF TABLET BY MOUTH DAILY       losartan 50 MG tablet    COZAAR    135 tablet    Take 1 tablet (50 mg) by mouth daily       nitroglycerin 0.4 MG sublingual tablet    NITROSTAT    10 tablet    Place 1 tablet (0.4 mg) under the tongue every 5 minutes as needed for chest pain If you are still having symptoms after 3 doses (15 minutes) call 911.       order for DME      Equipment being ordered: {Select DME item(s) to order:103765}       order for DME     1 Units    Auto CPAP 8-15 cmH20       spironolactone 25 MG tablet    ALDACTONE    45 tablet    TAKE ONE-HALF TABLET BY MOUTH DAILY       * Notice:  This list has 4 medication(s) that are the same as other medications prescribed for you. Read the directions carefully, and ask your doctor or other care provider to review them with you.

## 2017-04-26 NOTE — NURSING NOTE
"Chief Complaint   Patient presents with     Follow Up For     3 month Return CORE appt; 61yr old male wth a h/o chronic systolic heart failure secondary to ICM presenting for follow-up with labs prior.  Patient only reports coughing at times, othewise, doing well.       Initial BP 97/75 (BP Location: Left arm, Patient Position: Chair, Cuff Size: Adult Large)  Pulse 77  Wt 88.2 kg (194 lb 8 oz)  SpO2 97%  BMI 32.37 kg/m2 Estimated body mass index is 32.37 kg/(m^2) as calculated from the following:    Height as of 1/11/17: 1.651 m (5' 5\").    Weight as of this encounter: 88.2 kg (194 lb 8 oz)..  BP completed using cuff size: dayne Rodriguez, RMerlyMMerlyA.        "

## 2017-04-26 NOTE — LETTER
4/26/2017      RE: Lucian Oliveira  [address] [address]       Dear Colleague,    Thank you for the opportunity to participate in the care of your patient, Lucian Oliveira, at the [facility]. Please see a copy of my visit note below.    HPI:    Lucian Oliveira is a pleasant, 61-year-old male with known coronary artery disease status post coronary artery bypass grafting and percutaneous coronary artery intervention in the past, ischemic cardiomyopathy and congestive heart failure, ejection fraction 10%. He has been following in the [facility] along with Dr. Sheridan for ongoing management of congestive heart failure and ongoing evaluation for possible advanced heart failure therapies. He was last seen 3 months ago when he looked well though his renal function had declined. Repeat testing since then were stable. He returns to [facility] [place].  Lucian has been more consistently using his CPAP and thinks it's helping. No chest pain, palpitations, lightheadedness, edema, orthopnea, or PND. Appetite is good and no nausea or early satiety. He does struggle with fatigue and his wife notes that he dozes off while watching TV daily.    PAST MEDICAL HISTORY:  Past Medical History:   Diagnosis Date     NO ACTIVE PROBLEMS        FAMILY HISTORY:  Family History   Problem Relation Age of Onset     DIABETES Brother      DIABETES Sister      DIABETES Sister      Macular Degeneration No family hx of      Glaucoma No family hx of      Myocardial Infarction No family hx of      KIDNEY DISEASE No family hx of        SOCIAL HISTORY:  Social History     Social History     Marital Status:      Spouse Name: N/A     Number of Children: 4     Years of Education: N/A     Occupational History      [employer]     Social History Main Topics     Smoking status: Never Smoker      Smokeless tobacco:  Never Used      Comment: Never smoked; non-smoking household     Alcohol Use: Yes      Comment: rare use     Drug Use: No     Sexual Activity:     Partners: Female     Other Topics Concern     Parent/Sibling W/ Cabg, Mi Or Angioplasty Before 65f 55m? No     Social History Narrative       CURRENT MEDICATIONS:  Current Outpatient Prescriptions   Medication     spironolactone (ALDACTONE) 25 MG tablet     clopidogrel (PLAVIX) 75 MG tablet     isosorbide mononitrate (IMDUR) 30 MG 24 hr tablet     atorvastatin (LIPITOR) 40 MG tablet     blood glucose monitoring (ACCU-CHEK FELIPE SMARTVIEW) meter device kit     blood glucose monitoring (ONE TOUCH ULTRA) test strip     blood glucose monitoring (ONE TOUCH DELICA) lancets     insulin glargine (BASAGLAR KWIKPEN) 100 UNIT/ML injection     blood glucose monitoring (NO BRAND SPECIFIED) meter device kit     order for DME     order for DME     nitroglycerin (NITROSTAT) 0.4 MG SL tablet     blood glucose calibration (ONETOUCH ULTRA CONTROL) solution     losartan (COZAAR) 50 MG tablet     isosorbide mononitrate (IMDUR) 30 MG 24 hr tablet     furosemide (LASIX) 40 MG tablet     glipiZIDE (GLUCOTROL) 5 MG tablet     carvedilol (COREG) 25 MG tablet     insulin pen needle 31G X 5 MM     albuterol (PROAIR HFA, PROVENTIL HFA, VENTOLIN HFA) 108 (90 BASE) MCG/ACT inhaler     aspirin 81 MG tablet     No current facility-administered medications for this visit.        ROS:   Constitutional: No fever, chills, or sweats. No weight gain/loss.   ENT: No visual disturbance, ear ache, epistaxis, sore throat.   Allergies/Immunologic: Negative.   Respiratory: No cough, hemoptysis.   Cardiovascular: As per HPI.   GI: No nausea, vomiting, hematemesis, melena, or hematochezia.   : No urinary frequency, dysuria, or hematuria.   Integument: Negative.   Psychiatric: Negative.   Neuro: Negative.   Endocrinology: Negative.   Musculoskeletal: Negative.    EXAM:  There were no vitals taken for this  visit.  General: appears comfortable, alert and articulate  Head: normocephalic, atraumatic  Eyes: anicteric sclera, EOMI  Neck: no adenopathy  Orophyarynx: moist mucosa, no lesions, dentition intact  Heart: regular, S1/S2, no murmur, gallop, rub, JVP is flat  Lungs: clear, no rales or wheezing  Abdomen: Round, obese, soft, non-tender, bowel sounds present, no hepatomegaly  Extremities: no clubbing, cyanosis or edema  Neurological: normal speech and affect, no gross motor deficits    Labs:   Last Basic Metabolic Panel:  Lab Results   Component Value Date     04/25/2017      Lab Results   Component Value Date    POTASSIUM 4.7 04/25/2017     Lab Results   Component Value Date    CHLORIDE 103 04/25/2017     Lab Results   Component Value Date    BRYANNA 8.9 04/25/2017     Lab Results   Component Value Date    CO2 26 04/25/2017     Lab Results   Component Value Date    BUN 31 04/25/2017     Lab Results   Component Value Date    CR 1.77 04/25/2017     Lab Results   Component Value Date     04/25/2017         Assessment and Plan:   Mr. Oliveira is a pleasant 61 year old man with a history of ischemic cardiomyopathy and advanced heart failure who looks well from a cardiac standpoint. His renal function has improved since his last visit. We'll make no changes today although he was asked to pursue a structured weight loss program such as Weight Watchers. He has follow up scheduled with Dr. Sheridan including a repeat CPX in roughly 6 weeks away. I asked him to aim to reduce his weight by 5 pounds by then. He will follow up in CORE clinic ~3-4 months after seeing Dr. Sheridan.  1. Chronic systolic heart failure secondary to ischemic cardiomyopathy  ACE/ARB-Yes  BB-yes  Ald Ant-yes  SCD Prevention: ICD  %BiV: N/A  Volume: Evolemic  Apnea screening. Using CPAP  Follow up: 6 weeks with Dr. Sheridan and CORE 3-4 months later  2. CAD. On aspirin, beta blocker, and nitroglycerin  3. Chronic kidney disease. Sees   Chele. Creatinine improved from last visit    25 minutes spent in direct care, of which >50% in counseling    Please do not hesitate to contact me if you have any questions/concerns.     Sincerely,     Cassandra Mcgovern, ROSINA    CC  Patient Care Team:  Anna Archuleta PA-C as PCP - General (Physician Assistant)  Lucille Armendariz, RN as Nurse Coordinator (Cardiology)

## 2017-04-27 ENCOUNTER — OFFICE VISIT (OUTPATIENT)
Dept: FAMILY MEDICINE | Facility: CLINIC | Age: 62
End: 2017-04-27
Payer: COMMERCIAL

## 2017-04-27 VITALS
SYSTOLIC BLOOD PRESSURE: 110 MMHG | HEART RATE: 73 BPM | TEMPERATURE: 98.4 F | DIASTOLIC BLOOD PRESSURE: 70 MMHG | WEIGHT: 194 LBS | OXYGEN SATURATION: 97 % | BODY MASS INDEX: 32.32 KG/M2 | HEIGHT: 65 IN

## 2017-04-27 DIAGNOSIS — I50.9 CHF (NYHA CLASS II, ACC/AHA STAGE C) (H): Chronic | ICD-10-CM

## 2017-04-27 DIAGNOSIS — N18.30 CKD (CHRONIC KIDNEY DISEASE) STAGE 3, GFR 30-59 ML/MIN (H): Chronic | ICD-10-CM

## 2017-04-27 DIAGNOSIS — Z79.4 TYPE 2 DIABETES MELLITUS WITH DIABETIC NEPHROPATHY, WITH LONG-TERM CURRENT USE OF INSULIN (H): Chronic | ICD-10-CM

## 2017-04-27 DIAGNOSIS — I50.22 CHRONIC SYSTOLIC HEART FAILURE (H): Chronic | ICD-10-CM

## 2017-04-27 DIAGNOSIS — N25.81 SECONDARY RENAL HYPERPARATHYROIDISM (H): ICD-10-CM

## 2017-04-27 DIAGNOSIS — I10 HYPERTENSION GOAL BP (BLOOD PRESSURE) < 140/90: Primary | Chronic | ICD-10-CM

## 2017-04-27 DIAGNOSIS — R80.9 PROTEINURIA: ICD-10-CM

## 2017-04-27 DIAGNOSIS — E11.21 TYPE 2 DIABETES MELLITUS WITH DIABETIC NEPHROPATHY, WITH LONG-TERM CURRENT USE OF INSULIN (H): Chronic | ICD-10-CM

## 2017-04-27 DIAGNOSIS — I25.810 CORONARY ARTERY DISEASE INVOLVING OTHER CORONARY ARTERY BYPASS GRAFT, ANGINA PRESENCE UNSPECIFIED: Chronic | ICD-10-CM

## 2017-04-27 LAB
ALT SERPL W P-5'-P-CCNC: 39 U/L (ref 0–70)
CREAT UR-MCNC: 112 MG/DL
HBA1C MFR BLD: 10.5 % (ref 4.3–6)
MICROALBUMIN UR-MCNC: 44 MG/L
MICROALBUMIN/CREAT UR: 39.38 MG/G CR (ref 0–17)

## 2017-04-27 PROCEDURE — 84460 ALANINE AMINO (ALT) (SGPT): CPT | Performed by: PHYSICIAN ASSISTANT

## 2017-04-27 PROCEDURE — 36415 COLL VENOUS BLD VENIPUNCTURE: CPT | Performed by: PHYSICIAN ASSISTANT

## 2017-04-27 PROCEDURE — 82043 UR ALBUMIN QUANTITATIVE: CPT | Performed by: PHYSICIAN ASSISTANT

## 2017-04-27 PROCEDURE — 99207 C FOOT EXAM  NO CHARGE: CPT | Performed by: PHYSICIAN ASSISTANT

## 2017-04-27 PROCEDURE — 99214 OFFICE O/P EST MOD 30 MIN: CPT | Performed by: PHYSICIAN ASSISTANT

## 2017-04-27 PROCEDURE — 83036 HEMOGLOBIN GLYCOSYLATED A1C: CPT | Performed by: PHYSICIAN ASSISTANT

## 2017-04-27 NOTE — PATIENT INSTRUCTIONS
Increase your insulin dosing to 26 units at bedtime for 1 week. After 1 week increase your insulin dosing again to 28 units.   You need to start keeping a log of your blood sugars so I can review this.   The [place] pharmacist will be contacting you to follow up regarding your sugar, work with them to improve upon your diabetes.   Follow up with me in 2 months.      Aumente lainez dosis de insulina a 26 unidades a la hora de acostarse marissa 1 semana. Después de 1 semana, aumente de nuevo lainez dosis de insulina a 28 unidades.   Cali necesita comenzar a mantener un registro de kely azúcares en la sparkle para que pueda revisar esto.   El farmacéutico MT se pondrá en contacto con cali para hacer un seguimiento de lainez azúcar, trabajar con ellos para mejorar lainez diabetes.   Nos vemos en 2 meses.

## 2017-04-27 NOTE — MR AVS SNAPSHOT
After Visit Summary   4/27/2017    Lucian Oliveira    MRN: [id number]           Patient Information     Date Of Birth          1955        Visit Information        Provider Department      4/27/2017 7:30 AM Anna Archuleta PA-C; [employer] [facility]        Today's Diagnoses     Hypertension goal BP (blood pressure) < 140/90    -  1    Type 2 diabetes mellitus with diabetic nephropathy, with long-term current use of insulin (H)        Coronary artery disease involving other coronary artery bypass graft, angina presence unspecified        CKD (chronic kidney disease) stage 3, GFR 30-59 ml/min        CHF (NYHA class II, ACC/AHA stage C) (H)        Chronic systolic heart failure (H)        Proteinuria        Secondary renal hyperparathyroidism (H)          Care Instructions    Increase your insulin dosing to 26 units at bedtime for 1 week. After 1 week increase your insulin dosing again to 28 units.   You need to start keeping a log of your blood sugars so I can review this.   The [place] pharmacist will be contacting you to follow up regarding your sugar, work with them to improve upon your diabetes.   Follow up with me in 2 months.      Aumente lainez dosis de insulina a 26 unidades a la hora de acostarse marissa 1 semana. Después de 1 semana, aumente de nuevo lainez dosis de insulina a 28 unidades.   Usted necesita comenzar a mantener un registro de kely azúcares en la sparkle para que pueda revisar esto.   El farmacéutico MTM se pondrá en contacto con usted para hacer un seguimiento de lainez azúcar, trabajar con ellos para mejorar lainez diabetes.   Nos vemos en 2 meses.          Follow-ups after your visit        Additional Services     MED THERAPY MANAGE REFERRAL       Your provider has referred you to: **[facility] Scheduling (numerous locations) [phone]   [email or web address]    Reason for Referral:  Uncontrolled diabetes    The [facility] [place] [facility] Management department will contact you to schedule an appointment.  You may also schedule the appointment by calling [phone].  For [facility] patients, please call [phone] to confirm/schedule your appointment on the next business day.    This service is designed to help you get the most from your medications.  A specially trained Pharmacist will work closely with you and your providers to solve any questions, concerns, issues or problems related to your medications.    Please bring all of your prescription and non-prescription medications (such as vitamins, over-the-counter medications, and herbals) or a detailed medication list to your appointment.    If you have a glucose meter or other home monitoring information, please also bring this to your appointment (i.e. blood glucose log, blood pressure log, pain log, etc.).                  Your next 10 appointments already scheduled     Jun 05, 2017 10:00 AM CDT   LAB with ACUTE CARE LAB [facility], [place], Lab ([facility], [facility])    [address] [address]              Patient must bring picture ID.  Patient should be prepared to give a urine specimen  Please do not eat 10-12 hours before your appointment if you are coming in fasting for labs on lipids, cholesterol, or glucose (sugar).  Pregnant women should follow their Care Team instructions. Water with medications is okay. Do not drink coffee or other fluids.   If you have concerns about taking  your medications, please ask at office or if scheduling via [employer], send a message by clicking on Secure Messaging, Message Your Care Team.            Jun 05, 2017 10:30 AM CDT   Card Cardpul Stress Tst Adult with UUEKGS    ELECTROCARDIOLOGY ([facility], [facility])    [address] [address]               Jun 05, 2017  1:00 PM CDT    Ech [place] with UUETEER1   [facility] [place],  Echocardiography ([facility])    [address]  [facility] [id number] [phone]           1.  Please bring or wear a comfortable two-piece outfit. 2.  Arrival time: -   [facility]:  arrive 75 minutes prior to examination time. -   [facility]:  arrive 90 minutes prior to examination time. -   [facility]:   arrive 15 minutes prior to examination time. 3.  Plan to have someone here to drive you home after the test. -   Someone should stay with you for 6 hours after your test. 4.  No food or drink: -   6 hours before the test 5.  If you take antacids or water pills (diuretics): Do not take them until after your test. You may take blood pressure medicine with a few sips of water. 6.  If you have diabetes: -   Morning slots preferred -   If you take insulin, call your diabetes care team. Ask if you should take a   dose the morning of your test. -   If you take diabetes medicine by mouth, don't take it on the morning of your test. Bring it with you to take after the test. (If you have questions, call your diabetes care team.) 7.  Bring a list of any medicines you are taking. 8.  Do not drive for 24 hours after the test. 9.   A responsible adult must stay with you for 24 hours after the test.  10.  For any questions that cannot be answered, please contact the ordering physician            Jun 05, 2017  3:00 PM CDT   (Arrive by 2:45 PM)   Implanted Defibulator with Uc Cv Device 1   [facility] ([facility])    [address] [place] [address] [phone]            Jun 05, 2017  3:30 PM CDT   (Arrive by 3:15 PM)   RETURN HEART FAILURE with Arvin Sheridan MD   [facility][note 3])    [address] [place] [address] [phone]              Who to contact     Normal or non-critical lab and imaging  "results will be communicated to you by MyChart, letter or phone within 4 business days after the clinic has received the results. If you do not hear from us within 7 days, please contact the clinic through [email or web address] or phone. If you have a critical or abnormal lab result, we will notify you by phone as soon as possible.  Submit refill requests through [employer] or call your pharmacy and they will forward the refill request to us. Please allow 3 business days for your refill to be completed.          If you need to speak with a  for additional information , please call: [phone]             Additional Information About Your Visit        [employer] Information     [employer] gives you secure access to your electronic health record. If you see a primary care provider, you can also send messages to your care team and make appointments. If you have questions, please call your primary care clinic.  If you do not have a primary care provider, please call [phone] and they will assist you.        Care EveryWhere ID     This is your Care EveryWhere ID. This could be used by other organizations to access your [facility] medical records  [id number]        Your Vitals Were     Pulse Temperature Height Pulse Oximetry BMI (Body Mass Index)       73 98.4  F (36.9  C) (Oral) 5' 5\" (1.651 m) 97% 32.28 kg/m2        Blood Pressure from Last 3 Encounters:   04/27/17 110/70   04/26/17 97/75   02/07/17 112/75    Weight from Last 3 Encounters:   04/27/17 194 lb (88 kg)   04/26/17 194 lb 8 oz (88.2 kg)   02/07/17 193 lb 3.2 oz (87.6 kg)              We Performed the Following     Albumin Random Urine Quantitative     ALT     C FOOT EXAM  NO CHARGE     Hemoglobin A1c     MED THERAPY MANAGE REFERRAL        Primary Care Provider Office Phone # Fax #    Anna Archuleta PA-C [phone]       [facility] [place] [address] [place] [address]        Thank you!     Thank you for choosing [facility] " [place]  for your care. Our goal is always to provide you with excellent care. Hearing back from our patients is one way we can continue to improve our services. Please take a few minutes to complete the written survey that you may receive in the mail after your visit with us. Thank you!             Your Updated Medication List - Protect others around you: Learn how to safely use, store and throw away your medicines at www.disposemymeds.org.          This list is accurate as of: 4/27/17  8:11 AM.  Always use your most recent med list.                   Brand Name Dispense Instructions for use    albuterol 108 (90 BASE) MCG/ACT Inhaler    PROAIR HFA/PROVENTIL HFA/VENTOLIN HFA    1 Inhaler    Inhale 2 puffs into the lungs 3 times daily       aspirin 81 MG tablet     30 tablet    Take 1 tablet (81 mg) by mouth daily       atorvastatin 40 MG tablet    LIPITOR    90 tablet    TAKE ONE TABLET BY MOUTH EVERY DAY       blood glucose calibration solution     1 Bottle    Use to calibrate blood glucose monitor as directed.       blood glucose monitoring lancets     1 Box    Use to test blood sugars   times daily or as directed.       * blood glucose monitoring meter device kit    no brand specified    1 kit    Use to test blood sugar 3 times daily or as directed.       * blood glucose monitoring meter device kit     1 kit    Use to test blood sugar 3-4 times daily, as directed.       blood glucose monitoring test strip    ONE TOUCH ULTRA    100 each    Use to test blood sugars 2-3 times daily or as directed.       carvedilol 25 MG tablet    COREG    180 tablet    Take 1 tablet (25 mg) by mouth 2 times daily (with meals)       clopidogrel 75 MG tablet    PLAVIX    90 tablet    TAKE ONE TABLET BY MOUTH EVERY DAY       furosemide 40 MG tablet    LASIX    180 tablet    Take 1 tablet (40 mg) by mouth daily Take an extra 40mg as needed if weight goes up 2# overnight, or more than 5# in 1 week.       glipiZIDE 5 MG tablet    GLUCOTROL     180 tablet    Take 1 tablet (5 mg) by mouth 2 times daily (before meals)       insulin glargine 100 UNIT/ML injection     18 mL    Inject 23 Units Subcutaneous At Bedtime       insulin pen needle 31G X 5 MM     100 each    Use one needle daily, as directed       * isosorbide mononitrate 30 MG 24 hr tablet    IMDUR    45 tablet    Take 1 tablet (30 mg) by mouth daily       * isosorbide mononitrate 30 MG 24 hr tablet    IMDUR    45 tablet    TAKE ONE-HALF TABLET BY MOUTH DAILY       losartan 50 MG tablet    COZAAR    135 tablet    Take 1 tablet (50 mg) by mouth daily       nitroglycerin 0.4 MG sublingual tablet    NITROSTAT    10 tablet    Place 1 tablet (0.4 mg) under the tongue every 5 minutes as needed for chest pain If you are still having symptoms after 3 doses (15 minutes) call 911.       order for DME      Equipment being ordered: {Select DME item(s) to order:988819}       order for DME     1 Units    Auto CPAP 8-15 cmH20       spironolactone 25 MG tablet    ALDACTONE    45 tablet    TAKE ONE-HALF TABLET BY MOUTH DAILY       * Notice:  This list has 4 medication(s) that are the same as other medications prescribed for you. Read the directions carefully, and ask your doctor or other care provider to review them with you.

## 2017-04-27 NOTE — NURSING NOTE
"Chief Complaint   Patient presents with     Recheck Medication       Initial /70  Pulse 73  Temp 98.4  F (36.9  C) (Oral)  Ht 5' 5\" (1.651 m)  Wt 194 lb (88 kg)  SpO2 97%  BMI 32.28 kg/m2 Estimated body mass index is 32.28 kg/(m^2) as calculated from the following:    Height as of this encounter: 5' 5\" (1.651 m).    Weight as of this encounter: 194 lb (88 kg).  Medication Reconciliation: complete   Ynes Fam MA      "

## 2017-04-27 NOTE — PROGRESS NOTES
SUBJECTIVE:                                                    Lucian Oliveira is a 61 year old male who presents to clinic today for the following health issues:    Diabetes Follow-up    Patient is checking blood sugars: twice daily.    Blood sugar testing frequency justification: Uncontrolled diabetes  Results are as follows:         am - 140         bedtime - 180    Diabetic concerns: None     Symptoms of hypoglycemia (low blood sugar): none     Paresthesias (numbness or burning in feet) or sores: No     Date of last diabetic eye exam: 8/2016       Amount of exercise or physical activity: 3-4x week     Problems taking medications regularly: No    Medication side effects: none    Diet: regular (no restrictions)        Problem list and histories reviewed & adjusted, as indicated.  Additional history: as documented    Patient Active Problem List   Diagnosis     CAD (coronary artery disease)     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)     Hyperlipidemia LDL goal <100     Hypertension goal BP (blood pressure) < 140/90     Advanced directives, counseling/discussion     CHF (NYHA class II, ACC/AHA stage C) (H)     CKD (chronic kidney disease) stage 3, GFR 30-59 ml/min     Health Care Home     Automatic implantable cardioverter-defibrillator in situ     Chronic systolic heart failure (H)     Proteinuria     Vitamin D deficiency     OA (osteoarthritis) of knee     Chondromalacia of patella, unspecified laterality     Pain in joint of left shoulder     Tinnitus     Ptosis of right eyelid     Secondary renal hyperparathyroidism (H)     Cortical cataract of both eyes     Moderate nonproliferative diabetic retinopathy, without macular edema, associated with type 2 diabetes mellitus     Non morbid obesity due to excess calories     CHANTEL (obstructive sleep apnea)- severe (AHI 30)     Past Surgical History:   Procedure Laterality Date     C CABG, ARTERY-VEIN, THREE  02/2008     IMPLANT AUTOMATIC IMPLANTABLE  "CARDIOVERTER DEFIBRILLATOR         Social History   Substance Use Topics     Smoking status: Never Smoker     Smokeless tobacco: Never Used      Comment: Never smoked; non-smoking household     Alcohol use No      Comment: rare use     Family History   Problem Relation Age of Onset     DIABETES Brother      DIABETES Sister      DIABETES Sister      Macular Degeneration No family hx of      Glaucoma No family hx of      Myocardial Infarction No family hx of      KIDNEY DISEASE No family hx of            Reviewed and updated as needed this visit by clinical staff  Tobacco  Allergies  Meds       Reviewed and updated as needed this visit by Provider         ROS:  Constitutional, neuro, endocrine, cardiac systems are negative, except as otherwise noted.    OBJECTIVE:                                                    /70  Pulse 73  Temp 98.4  F (36.9  C) (Oral)  Ht 5' 5\" (1.651 m)  Wt 194 lb (88 kg)  SpO2 97%  BMI 32.28 kg/m2  Body mass index is 32.28 kg/(m^2).  GENERAL APPEARANCE: healthy, alert and no distress  MS: extremities normal- no gross deformities noted  DIABETIC FOOT EXAM: normal DP and PT pulses, no trophic changes or ulcerative lesions, normal sensory exam and normal monofilament exam, except for findings noted on diabetic foot graphical image.      Missed #9 on left      PSYCH: mentation appears normal and affect normal/bright    Diagnostic test results:  Diagnostic Test Results:  Results for orders placed or performed in visit on 04/27/17 (from the past 24 hour(s))   Hemoglobin A1c   Result Value Ref Range    Hemoglobin A1C 10.5 (H) 4.3 - 6.0 %        ASSESSMENT:                                                      1. Hypertension goal BP (blood pressure) < 140/90    2. Type 2 diabetes mellitus with diabetic nephropathy, with long-term current use of insulin (H)    3. Coronary artery disease involving other coronary artery bypass graft, angina presence unspecified    4. CKD (chronic kidney " disease) stage 3, GFR 30-59 ml/min    5. CHF (NYHA class II, ACC/AHA stage C) (H)    6. Chronic systolic heart failure (H)    7. Proteinuria    8. Secondary renal hyperparathyroidism (H)         PLAN:                                                    Discussed the importance of good diabetes control with the patient today. His A1c continues to worsen. Will get MTM involved again. Asked patient once again to bring his blood sugar log to his next visit.    Patient Instructions   Increase your insulin dosing to 26 units at bedtime for 1 week. After 1 week increase your insulin dosing again to 28 units.   You need to start keeping a log of your blood sugars so I can review this.   The MTM pharmacist will be contacting you to follow up regarding your sugar, work with them to improve upon your diabetes.   Follow up with me in 2 months.      Aumente lainez dosis de insulina a 26 unidades a la hora de acostarse marissa 1 semana. Después de 1 semana, aumente de nuevo lainez dosis de insulina a 28 unidades.   Cali necesita comenzar a mantener un registro de kely azúcares en la sparkle para que pueda revisar esto.   El farmacéutico MTM se pondrá en contacto con cali para hacer un seguimiento de lainez azúcar, trabajar con ellos para mejorar lainez diabetes.   Nos vemos en 2 meses.       The patient was in agreement with the plan today and had no questions or concerns prior to leaving the clinic.     Anna Archuleta PA-C  [facility]

## 2017-04-28 DIAGNOSIS — Z79.4 TYPE 2 DIABETES MELLITUS WITH DIABETIC NEPHROPATHY, WITH LONG-TERM CURRENT USE OF INSULIN (H): Primary | Chronic | ICD-10-CM

## 2017-04-28 DIAGNOSIS — E11.21 TYPE 2 DIABETES MELLITUS WITH DIABETIC NEPHROPATHY, WITH LONG-TERM CURRENT USE OF INSULIN (H): Primary | Chronic | ICD-10-CM

## 2017-04-28 RX ORDER — BLOOD SUGAR DIAGNOSTIC
STRIP MISCELLANEOUS
Qty: 100 EACH | Refills: 0 | Status: SHIPPED | OUTPATIENT
Start: 2017-04-28 | End: 2017-07-10

## 2017-04-28 RX ORDER — INSULIN GLARGINE 100 [IU]/ML
28 INJECTION, SOLUTION SUBCUTANEOUS AT BEDTIME
Qty: 18 ML | Refills: 0 | Status: SHIPPED | OUTPATIENT
Start: 2017-04-28 | End: 2017-06-06

## 2017-04-28 NOTE — TELEPHONE ENCOUNTER
Patient needs both items today    Thank you  Maribell Barros CPht    [facility]  Phone: [phone]  Fax: [phone]

## 2017-04-28 NOTE — TELEPHONE ENCOUNTER
Chava larios         Last Written Prescription Date: 02/07/17  Last Fill Quantity: 15 mL, # refills: 0  Last Office Visit with FMG, UMP or  Health prescribing provider:  04/27/17   Next 5 appointments (look out 90 days)     May 04, 2017  8:30 AM CDT   Office Visit with Kalyani Zamudio RPH   [facility] ([facility])    [address] [address] [phone]            Jul 05, 2017  7:40 AM CDT   Office Visit with Anna Archuleta PA-C   [facility] ([facility])    [address] [id number] [phone]                   BP Readings from Last 3 Encounters:   04/27/17 110/70   04/26/17 97/75   02/07/17 112/75     Lab Results   Component Value Date    MICROL 44 04/27/2017     Lab Results   Component Value Date    UMALCR 39.38 04/27/2017     Creatinine   Date Value Ref Range Status   04/25/2017 1.77 (H) 0.66 - 1.25 mg/dL Final   ]  GFR Estimate   Date Value Ref Range Status   04/25/2017 39 (L) >60 mL/min/1.7m2 Final     Comment:     Non  GFR Calc   02/20/2017 38 (L) >60 mL/min/1.7m2 Final     Comment:     Non  GFR Calc   01/12/2017 37 (L) >60 mL/min/1.7m2 Final     Comment:     Non  GFR Calc     GFR Estimate If Black   Date Value Ref Range Status   04/25/2017 47 (L) >60 mL/min/1.7m2 Final     Comment:      GFR Calc   02/20/2017 46 (L) >60 mL/min/1.7m2 Final     Comment:      GFR Calc   01/12/2017 45 (L) >60 mL/min/1.7m2 Final     Comment:      GFR Calc     Lab Results   Component Value Date    CHOL 99 07/06/2016     Lab Results   Component Value Date    HDL 31 07/06/2016     Lab Results   Component Value Date    LDL 23 07/06/2016     Lab Results   Component Value Date    TRIG 225 07/06/2016     Lab Results   Component Value Date    CHOLHDLRATIO 2.6 06/27/2015     Lab Results   Component Value Date    AST 16  03/18/2016     Lab Results   Component Value Date    ALT 39 04/27/2017     Lab Results   Component Value Date    A1C 10.5 04/27/2017    A1C 9.8 02/07/2017    A1C 10.7 01/05/2017    A1C 7.5 09/07/2016    A1C 8.5 07/06/2016     Potassium   Date Value Ref Range Status   04/25/2017 4.7 3.4 - 5.3 mmol/L Final       Test strips accu chek smart view         Last Written Prescription Date: 12/30/16  Last Fill Quantity: 100, # refills: 0  Last Office Visit with FMFAZAL, HARSH or [facility] prescribing provider:  04/27/17   Next 5 appointments (look out 90 days)     May 04, 2017  8:30 AM CDT   Office Visit with Kalyani Zamudio [facility] ([facility])    [address] [place] [address]   [phone]            Jul 05, 2017  7:40 AM CDT   Office Visit with Anna Archuleta PA-C   [facility]    [address] [place] [id number] [phone]                   BP Readings from Last 3 Encounters:   04/27/17 110/70   04/26/17 97/75   02/07/17 112/75     Lab Results   Component Value Date    MICROL 44 04/27/2017     Lab Results   Component Value Date    UMALCR 39.38 04/27/2017     Creatinine   Date Value Ref Range Status   04/25/2017 1.77 (H) 0.66 - 1.25 mg/dL Final   ]  GFR Estimate   Date Value Ref Range Status   04/25/2017 39 (L) >60 mL/min/1.7m2 Final     Comment:     Non  GFR Calc   02/20/2017 38 (L) >60 mL/min/1.7m2 Final     Comment:     Non  GFR Calc   01/12/2017 37 (L) >60 mL/min/1.7m2 Final     Comment:     Non  GFR Calc     GFR Estimate If Black   Date Value Ref Range Status   04/25/2017 47 (L) >60 mL/min/1.7m2 Final     Comment:      GFR Calc   02/20/2017 46 (L) >60 mL/min/1.7m2 Final     Comment:      GFR Calc   01/12/2017 45 (L) >60 mL/min/1.7m2 Final     Comment:      GFR Calc     Lab Results   Component Value Date     CHOL 99 07/06/2016     Lab Results   Component Value Date    HDL 31 07/06/2016     Lab Results   Component Value Date    LDL 23 07/06/2016     Lab Results   Component Value Date    TRIG 225 07/06/2016     Lab Results   Component Value Date    CHOLHDLRATIO 2.6 06/27/2015     Lab Results   Component Value Date    AST 16 03/18/2016     Lab Results   Component Value Date    ALT 39 04/27/2017     Lab Results   Component Value Date    A1C 10.5 04/27/2017    A1C 9.8 02/07/2017    A1C 10.7 01/05/2017    A1C 7.5 09/07/2016    A1C 8.5 07/06/2016     Potassium   Date Value Ref Range Status   04/25/2017 4.7 3.4 - 5.3 mmol/L Final

## 2017-05-04 ENCOUNTER — OFFICE VISIT (OUTPATIENT)
Dept: PHARMACY | Facility: CLINIC | Age: 62
End: 2017-05-04
Payer: COMMERCIAL

## 2017-05-04 VITALS — DIASTOLIC BLOOD PRESSURE: 56 MMHG | SYSTOLIC BLOOD PRESSURE: 110 MMHG

## 2017-05-04 DIAGNOSIS — E11.21 TYPE 2 DIABETES MELLITUS WITH DIABETIC NEPHROPATHY, WITH LONG-TERM CURRENT USE OF INSULIN (H): Primary | Chronic | ICD-10-CM

## 2017-05-04 DIAGNOSIS — Z79.4 TYPE 2 DIABETES MELLITUS WITH DIABETIC NEPHROPATHY, WITH LONG-TERM CURRENT USE OF INSULIN (H): Primary | Chronic | ICD-10-CM

## 2017-05-04 DIAGNOSIS — I10 HYPERTENSION GOAL BP (BLOOD PRESSURE) < 140/90: ICD-10-CM

## 2017-05-04 DIAGNOSIS — E78.5 HYPERLIPIDEMIA LDL GOAL <100: ICD-10-CM

## 2017-05-04 DIAGNOSIS — I50.9 CHF (NYHA CLASS II, ACC/AHA STAGE C) (H): ICD-10-CM

## 2017-05-04 DIAGNOSIS — I25.739 CORONARY ARTERY DISEASE INVOLVING NONAUTOLOGOUS BIOLOGICAL CORONARY BYPASS GRAFT WITH ANGINA PECTORIS (H): ICD-10-CM

## 2017-05-04 PROCEDURE — 99607 MTMS BY PHARM ADDL 15 MIN: CPT | Performed by: PHARMACIST

## 2017-05-04 PROCEDURE — 99605 MTMS BY PHARM NP 15 MIN: CPT | Performed by: PHARMACIST

## 2017-05-04 RX ORDER — GLIPIZIDE 10 MG/1
10 TABLET, FILM COATED, EXTENDED RELEASE ORAL DAILY
Qty: 90 TABLET | Refills: 1 | Status: SHIPPED | OUTPATIENT
Start: 2017-05-04 | End: 2017-07-05

## 2017-05-04 NOTE — MR AVS SNAPSHOT
After Visit Summary   5/4/2017    Lucian Oliveira    MRN: [id number]           Patient Information     Date Of Birth          1955        Visit Information        Provider Department      5/4/2017 8:15 AM Kalyani Zamudio, [facility]; [facility] LANGUAGE SERVICES [facility] MTM        Today's Diagnoses     Type 2 diabetes mellitus with diabetic nephropathy, with long-term current use of insulin (H)    -  1      Care Instructions    Recommendations from today's MTM visit:                                                    MTM (medication therapy management) is a service provided by a clinical pharmacist designed to help you get the most of out of your medicines.   Today we reviewed what your medicines are for, how to know if they are working, that your medicines are safe and how to make your medicine regimen as easy as possible.     1.  Stop taking glipizide 5mg and change to glipizide XL 10mg once daily.  This can be taken at any time of day.    2.  Increase Basaglar to 28 units as planned and we'll see how your blood sugars do.    Next MTM visit:  Tuesday, June 6th at 9:30am    To schedule another MTM appointment, please call the clinic directly or you may call the MTM scheduling line at [phone] or toll-free at [phone].     My Clinical Pharmacist's contact information:                                                      It was a pleasure seeing you today!  Please feel free to contact me with any questions or concerns you have.      Kalyani Zamudio, PharmD, [facility]CP  Medication Therapy Management Provider  Pager: [phone]     You may receive a survey about the MTM services you received.  I would appreciate your feedback to help me serve you better in the future. Please fill it out and return it when you can. Your comments will be anonymous.                   Follow-ups after your visit        Your next 10 appointments already scheduled     Jun 05, 2017 10:00 AM CDT   LAB with ACUTE CARE  LAB UU   [facility] [place], Lab ([facility])    [address] [address]              Patient must bring picture ID.  Patient should be prepared to give a urine specimen  Please do not eat 10-12 hours before your appointment if you are coming in fasting for labs on lipids, cholesterol, or glucose (sugar).  Pregnant women should follow their Care Team instructions. Water with medications is okay. Do not drink coffee or other fluids.   If you have concerns about taking  your medications, please ask at office or if scheduling via oohilove, send a message by clicking on Secure Messaging, Message Your Care Team.            Jun 05, 2017 10:30 AM CDT   Card Cardpul Stress Tst Adult with UUEKGS   [facility] ELECTROCARDIOLOGY ([facility])    [address] [facility] [address] [address]               Jun 05, 2017  1:00 PM CDT   Ech Coleman with UUETEER1   [facility],  Echocardiography ([facility])    [address] [address]   [phone]           1.  Please bring or wear a comfortable two-piece outfit. 2.  Arrival time: -   [facility]:  arrive 75 minutes prior to examination time. -   [facility]:  arrive 90 minutes prior to examination time. -   [facility]:   arrive 15 minutes prior to examination time. 3.  Plan to have someone here to drive you home after the test. -   Someone should stay with you for 6 hours after your test. 4.  No food or drink: -   6 hours before the test 5.  If you take antacids or water pills (diuretics): Do not take them until after your test. You may take blood pressure medicine with a few sips of water. 6.  If you have diabetes: -   Morning slots preferred -   If you take insulin, call your diabetes care team. Ask if you should take a   dose the morning of your test. -   If you take diabetes medicine by mouth, don't take it on the morning of your  test. Bring it with you to take after the test. (If you have questions, call your diabetes care team.) 7.  Bring a list of any medicines you are taking. 8.  Do not drive for 24 hours after the test. 9.   A responsible adult must stay with you for 24 hours after the test.  10.  For any questions that cannot be answered, please contact the ordering physician            Jun 05, 2017  3:00 PM CDT   (Arrive by 2:45 PM)   Implanted Defibulator with Uc Cv Device 1   [facility] ([facility])    [address] [place] [address] [phone]            Jun 05, 2017  3:30 PM CDT   (Arrive by 3:15 PM)   RETURN HEART FAILURE with Arvin Sheridan MD   [facility])    [address] [place] [address] [phone]            Jun 06, 2017  9:30 AM CDT   SHORT with Héctor Claros [facility] ([facility])    [address] [place] [id number] [phone]            Jul 05, 2017  7:40 AM CDT   Office Visit with Anna Archuleta PA-C   [facility] ([facility])    [address] [place] [phone]           Bring a current list of meds and any records pertaining to this visit.  For Physicals, please bring immunization records and any forms needing to be filled out.  Please arrive 10 minutes early to complete paperwork.              Who to contact     If you have questions or need follow up information about today's clinic visit or your schedule please contact [facility] directly at [phone].  Normal or non-critical lab and imaging results will be communicated to you by MyChart, letter or phone within 4 business days after the clinic has received the results. If you do not hear from us within 7 days, please contact the clinic through MyChart or phone. If you have a  critical or abnormal lab result, we will notify you by phone as soon as possible.  Submit refill requests through Hotalot or call your pharmacy and they will forward the refill request to us. Please allow 3 business days for your refill to be completed.          Additional Information About Your Visit        [facility] Information     Hotalot gives you secure access to your electronic health record. If you see a primary care provider, you can also send messages to your care team and make appointments. If you have questions, please call your primary care clinic.  If you do not have a primary care provider, please call [phone] and they will assist you.        Care EveryWhere ID     This is your Care EveryWhere ID. This could be used by other organizations to access your [place] medical records  [id number]         Blood Pressure from Last 3 Encounters:   05/04/17 110/56   04/27/17 110/70   04/26/17 97/75    Weight from Last 3 Encounters:   04/27/17 194 lb (88 kg)   04/26/17 194 lb 8 oz (88.2 kg)   02/07/17 193 lb 3.2 oz (87.6 kg)              Today, you had the following     No orders found for display         Today's Medication Changes          These changes are accurate as of: 5/4/17  8:57 AM.  If you have any questions, ask your nurse or doctor.               Start taking these medicines.        Dose/Directions    glipiZIDE 10 MG 24 hr tablet   Commonly known as:  glipiZIDE XL   Used for:  Type 2 diabetes mellitus with diabetic nephropathy, with long-term current use of insulin (H)   Replaces:  glipiZIDE 5 MG tablet        Dose:  10 mg   Take 1 tablet (10 mg) by mouth daily   Quantity:  90 tablet   Refills:  1         These medicines have changed or have updated prescriptions.        Dose/Directions    insulin glargine 100 UNIT/ML injection   This may have changed:  how much to take   Used for:  Type 2 diabetes mellitus with diabetic nephropathy, with long-term current use of insulin (H)        Dose:  28 Units    Inject 28 Units Subcutaneous At Bedtime   Quantity:  18 mL   Refills:  0       isosorbide mononitrate 30 MG 24 hr tablet   Commonly known as:  IMDUR   This may have changed:  Another medication with the same name was removed. Continue taking this medication, and follow the directions you see here.   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 Please contact your care team if you have questions.     glipiZIDE 5 MG tablet   Commonly known as:  GLUCOTROL   Replaced by:  glipiZIDE 10 MG 24 hr tablet                Where to get your medicines      These medications were sent to [facility] [place] - [place] - [address]  [address], [place] [address]     Phone:  [phone]     glipiZIDE 10 MG 24 hr tablet                Primary Care Provider Office Phone # Fax #    Anna Archuleta PA-C [phone]       [facility] [place] [address] [facility] [address]  [place] [address]        Thank you!     Thank you for choosing [facility]  for your care. Our goal is always to provide you with excellent care. Hearing back from our patients is one way we can continue to improve our services. Please take a few minutes to complete the written survey that you may receive in the mail after your visit with us. Thank you!             Your Updated Medication List - Protect others around you: Learn how to safely use, store and throw away your medicines at www.disposemymeds.org.          This list is accurate as of: 5/4/17  8:57 AM.  Always use your most recent med list.                   Brand Name Dispense Instructions for use    ACCU-CHEK SMARTVIEW test strip   Generic drug:  blood glucose monitoring     100 each    USE TO TEST BLOOD SUGAR TWO TIMES A DAY OR AS  DIRECTED       aspirin 81 MG tablet     30 tablet    Take 1 tablet (81 mg) by mouth daily       atorvastatin 40 MG tablet    LIPITOR    90 tablet    TAKE ONE TABLET BY MOUTH EVERY DAY       blood glucose monitoring meter device kit     1 kit    Use to test blood sugar 3-4 times daily, as directed.       carvedilol 25 MG tablet    COREG    180 tablet    Take 1 tablet (25 mg) by mouth 2 times daily (with meals)       clopidogrel 75 MG tablet    PLAVIX    90 tablet    TAKE ONE TABLET BY MOUTH EVERY DAY       furosemide 40 MG tablet    LASIX    180 tablet    Take 1 tablet (40 mg) by mouth daily Take an extra 40mg as needed if weight goes up 2# overnight, or more than 5# in 1 week.       glipiZIDE 10 MG 24 hr tablet    glipiZIDE XL    90 tablet    Take 1 tablet (10 mg) by mouth daily       insulin glargine 100 UNIT/ML injection     18 mL    Inject 28 Units Subcutaneous At Bedtime       insulin pen needle 31G X 5 MM     100 each    Use one needle daily, as directed       isosorbide mononitrate 30 MG 24 hr tablet    IMDUR    45 tablet    TAKE ONE-HALF TABLET BY MOUTH DAILY       losartan 50 MG tablet    COZAAR    135 tablet    Take 1 tablet (50 mg) by mouth daily       nitroglycerin 0.4 MG sublingual tablet    NITROSTAT    10 tablet    Place 1 tablet (0.4 mg) under the tongue every 5 minutes as needed for chest pain If you are still having symptoms after 3 doses (15 minutes) call 911.       order for DME     1 Units    Auto CPAP 8-15 cmH20       spironolactone 25 MG tablet    ALDACTONE    45 tablet    TAKE ONE-HALF TABLET BY MOUTH DAILY

## 2017-05-04 NOTE — PATIENT INSTRUCTIONS
Recommendations from today's MTM visit:                                                    MTM (medication therapy management) is a service provided by a clinical pharmacist designed to help you get the most of out of your medicines.   Today we reviewed what your medicines are for, how to know if they are working, that your medicines are safe and how to make your medicine regimen as easy as possible.     1.  Stop taking glipizide 5mg and change to glipizide XL 10mg once daily.  This can be taken at any time of day.    2.  Increase Basaglar to 28 units as planned and we'll see how your blood sugars do.    Next MTM visit:  Tuesday, June 6th at 9:30am    To schedule another MTM appointment, please call the clinic directly or you may call the MTM scheduling line at [phone] or toll-free at [phone].     My Clinical Pharmacist's contact information:                                                      It was a pleasure seeing you today!  Please feel free to contact me with any questions or concerns you have.      Kalyani Zamudio, [place], [facility]  Medication Therapy Management Provider  Pager: [phone]     You may receive a survey about the MTM services you received.  I would appreciate your feedback to help me serve you better in the future. Please fill it out and return it when you can. Your comments will be anonymous.

## 2017-05-04 NOTE — PROGRESS NOTES
SUBJECTIVE/OBJECTIVE:                                                    Lucian Oliveira is a 61 year old male coming in for a follow-up visit for Medication Therapy Management.  He was referred to me from Anna Archuleta PA-C.  He is joined by his wife and an  for our visit today.    Chief Complaint: Follow up from our visit on 3/22/16.  This is his first MTM visit in >1 year.  Here to f/up on diabetes management.    Tobacco: No tobacco use   Alcohol: not currently using    Medication Adherence: no issues reported    Diabetes:  Pt currently taking glipizide IR 5mg BID (takes AM and HS) and Basaglar 26 units daily with plans to increase to 28 units daily this week.   Pt is not experiencing side effects.  He was on metformin in the past but this was discontinued due to renal dysfunction  SMBG: 3 times daily.   Ranges (patient reported): 132-160mg/dL; occasionally up to 200mg/dL.  He reports his BG have improved as insulin doses have been increased.  He's been focusing on eating healthier and knows that's also helping with his BG  Symptoms of low blood sugar? none. Frequency of hypoglycemia? never.  Recent symptoms of high blood sugar? Fatigue  Eye exam: up to date  Foot exam: up to date  Microalbumin is not < 30 mg/g. Pt is taking an ACEi/ARB.  Aspirin: Taking 81mg daily and denies side effects  Diet/Exercise:  Sometimes he notes if he eats later in the evening his BG are higher in the AM.  He goes to the gym 4-5 times per week.  Typical breakfast - oatmeal and bananas OR eggs and 1 tortilla; lunch - sometimes chicken and salad or beans; dinner - oatmeal.  Lunch is the largest meal of the day.  Will sometimes snack on peanuts or pistachios, apples.  He's been focusing on eating more fruits and vegetables.    Hypertension/HFrEF/CAD: Current medications include ASA 81mg daily, clopidogrel 75mg daily, carvedilol 25mg BID, furosemide 40mg daily (and an extra 40mg PRN weight gain), Imdur 15mg daily, losartan  50mg daily, spironolactone 12.5mg daily and SL NTG PRN (has never used).  Patient does not self-monitor BP.  Patient reports no current medication side effects.  He reports his weight has been stable and he monitor sodium intake.  He last filled SL NTG about a year ago (verified today that current bottle is not ).    Hyperlipidemia: Current therapy includes atorvastatin 40mg once daily.  Pt reports no significant myalgias or other side effects.     Current labs include:  BP Readings from Last 3 Encounters:   17 110/70   17 97/75   17 112/75     Today's Vitals: /56     Lab Results   Component Value Date    A1C 10.5 2017   .  Lab Results   Component Value Date    CHOL 99 2016     Lab Results   Component Value Date    TRIG 225 2016     Lab Results   Component Value Date    HDL 31 2016     Lab Results   Component Value Date    LDL 23 2016       Liver Function Studies -   Recent Labs   Lab Test  17   0746  16   0953  16   0936   PROTTOTAL   --    --   7.7   ALBUMIN   --   3.6  3.7   BILITOTAL   --    --   0.6   ALKPHOS   --    --   134   AST   --    --   16   ALT  39   --   19       Lab Results   Component Value Date    UCRR 112 2017    MICROL 44 2017    UMALCR 39.38 (H) 2017       Last Basic Metabolic Panel:  Lab Results   Component Value Date     2017      Lab Results   Component Value Date    POTASSIUM 4.7 2017     Lab Results   Component Value Date    CHLORIDE 103 2017     Lab Results   Component Value Date    BUN 31 2017     Lab Results   Component Value Date    CR 1.77 2017     GFR Estimate   Date Value Ref Range Status   2017 39 (L) >60 mL/min/1.7m2 Final     Comment:     Non  GFR Calc   2017 38 (L) >60 mL/min/1.7m2 Final     Comment:     Non  GFR Calc   2017 37 (L) >60 mL/min/1.7m2 Final     Comment:     Non  GFR Calc        TSH   Date Value Ref Range Status   01/05/2017 1.44 0.40 - 4.00 mU/L Final   ]    Most Recent Immunizations   Administered Date(s) Administered     Influenza (IIV3) 10/15/2012     Influenza Vaccine IM 3yrs+ 4 Valent IIV4 09/14/2016     Pneumococcal 23 valent 04/03/2015     TDAP Vaccine (Adacel) 08/04/2009     ASSESSMENT:                                                    Current medications were reviewed today as discussed above.      Medication Adherence: no issues identified    Diabetes: Needs Improvement. Patient is not meeting A1c goal of < 8%. Self monitoring of blood glucose is not at goal of fasting  mg/dL and post prandial < 180 mg/dL.  Since lunch is his largest meal of the day, he's likely not getting much coverage from IR glipizide, would benefit from changing to ER version.       Hypertension/HFrEF/CAD: Stable.  On recommended medications, BP is at goal <140/90mmHg.  No changes needed today.    Hyperlipidemia: Stable. Pt is on high intensity statin which is indicated based on 2013 ACC/AHA guidelines for lipid management.        PLAN:                                                      1.  Changed glipizide to ER version - 10mg daily.  2.  He'll increase Basaglar to 28 units daily as planned this week.    I spent 45 minutes with this patient today.  All changes were made via collaborative practice agreement with Anna Archuleta. A copy of the visit note was provided to the patient's primary care provider.     Will follow up in 1 month (he declined coming back sooner).  Asked him to call sooner with s/s hypoglycemia.    The patient was given a summary of these recommendations as an after visit summary.    Kalyani Zamudio, PharmD, [facility]  Medication Therapy Management Provider  Pager: [phone]

## 2017-05-12 DIAGNOSIS — I10 HYPERTENSION GOAL BP (BLOOD PRESSURE) < 140/90: ICD-10-CM

## 2017-05-12 RX ORDER — CARVEDILOL 25 MG/1
TABLET ORAL
Qty: 180 TABLET | Refills: 0 | Status: SHIPPED | OUTPATIENT
Start: 2017-05-12 | End: 2017-07-05

## 2017-05-12 NOTE — TELEPHONE ENCOUNTER
Carvedilol      Last Written Prescription Date: 03/16/17  Last Fill Quantity: 180, # refills: 3  Last Office Visit with [facility], [facility] or [facility] prescribing provider:  04/27/17   Future Office Visit:    Next 5 appointments (look out 90 days)     Jun 06, 2017  9:30 AM CDT   SHORT with Héctor Claros [facility] ([facility])    [address]  [place] [address]   [phone]            Jul 05, 2017  7:40 AM CDT   Office Visit with Anna Archuleta PA-C   [facility] [place] ([facility])    [address]  [place] [id number] [phone]                    BP Readings from Last 3 Encounters:   05/04/17 110/56   04/27/17 110/70   04/26/17 97/75

## 2017-05-18 ENCOUNTER — DOCUMENTATION ONLY (OUTPATIENT)
Dept: SLEEP MEDICINE | Facility: CLINIC | Age: 62
End: 2017-05-18

## 2017-05-19 NOTE — PROGRESS NOTES
6 month [facility]    Data only recheck    Device settings:    EPAP Min Auto CPAP: 8.0 (The minimum allowable pressure in cmH2O)    EPAP Max Auto CPAP: 15.0 (The maximum allowable pressure in cmH2O)    Target  (95% of Target) 14 day average (Resmed): 12.2cm H20    Objective measures: 14 day rolling measures      Compliance   (Goal >70%)  --% compliance greater than four hours rolling average 14 days:71 %      Leak   (Goal < 24 lpm) --95% OF Leak in litres Rolling Average 14 Days: 28.62 lpm      AHI  (Goal < 5)  --AHI Rolling Average 14 Day: 0.72      Usage  (Goal >240)  --Time mask on face 14 day average: 269 min      Assessment:Pt meeting objective benchmarks.      Action plan: pt to follow up per provider request (1-2 yrs)

## 2017-06-05 ENCOUNTER — APPOINTMENT (OUTPATIENT)
Dept: LAB | Facility: CLINIC | Age: 62
End: 2017-06-05
Attending: INTERNAL MEDICINE
Payer: COMMERCIAL

## 2017-06-05 ENCOUNTER — HOSPITAL ENCOUNTER (OUTPATIENT)
Dept: CARDIOLOGY | Facility: CLINIC | Age: 62
End: 2017-06-05
Attending: INTERNAL MEDICINE
Payer: COMMERCIAL

## 2017-06-05 ENCOUNTER — PRE VISIT (OUTPATIENT)
Dept: CARDIOLOGY | Facility: CLINIC | Age: 62
End: 2017-06-05

## 2017-06-05 ENCOUNTER — HOSPITAL ENCOUNTER (OUTPATIENT)
Dept: CARDIOLOGY | Facility: CLINIC | Age: 62
Discharge: HOME | End: 2017-06-05
Attending: INTERNAL MEDICINE | Admitting: INTERNAL MEDICINE
Payer: COMMERCIAL

## 2017-06-05 VITALS
HEART RATE: 87 BPM | HEIGHT: 66 IN | BODY MASS INDEX: 31.98 KG/M2 | SYSTOLIC BLOOD PRESSURE: 110 MMHG | DIASTOLIC BLOOD PRESSURE: 69 MMHG | OXYGEN SATURATION: 97 % | WEIGHT: 199 LBS

## 2017-06-05 DIAGNOSIS — I50.9 CHF (NYHA CLASS II, ACC/AHA STAGE C) (H): Chronic | ICD-10-CM

## 2017-06-05 DIAGNOSIS — I50.22 CHRONIC SYSTOLIC HEART FAILURE (H): Chronic | ICD-10-CM

## 2017-06-05 DIAGNOSIS — I25.10 CAD (CORONARY ARTERY DISEASE): Chronic | ICD-10-CM

## 2017-06-05 DIAGNOSIS — I25.10 CORONARY ARTERY DISEASE INVOLVING NATIVE CORONARY ARTERY OF NATIVE HEART WITHOUT ANGINA PECTORIS: ICD-10-CM

## 2017-06-05 DIAGNOSIS — I50.22 CHRONIC SYSTOLIC HEART FAILURE (H): Primary | Chronic | ICD-10-CM

## 2017-06-05 DIAGNOSIS — I25.810 CORONARY ARTERY DISEASE INVOLVING OTHER CORONARY ARTERY BYPASS GRAFT, ANGINA PRESENCE UNSPECIFIED: ICD-10-CM

## 2017-06-05 PROCEDURE — 99214 OFFICE O/P EST MOD 30 MIN: CPT | Mod: ZP | Performed by: INTERNAL MEDICINE

## 2017-06-05 PROCEDURE — 93289 INTERROG DEVICE EVAL HEART: CPT | Mod: 26 | Performed by: INTERNAL MEDICINE

## 2017-06-05 PROCEDURE — 94621 CARDIOPULM EXERCISE TESTING: CPT | Performed by: INTERNAL MEDICINE

## 2017-06-05 PROCEDURE — 94621 CARDIOPULM EXERCISE TESTING: CPT | Mod: 26 | Performed by: INTERNAL MEDICINE

## 2017-06-05 PROCEDURE — 93306 TTE W/DOPPLER COMPLETE: CPT | Mod: 26 | Performed by: INTERNAL MEDICINE

## 2017-06-05 RX ADMIN — HUMAN ALBUMIN MICROSPHERES AND PERFLUTREN 6 ML: 10; .22 INJECTION, SOLUTION INTRAVENOUS at 12:15

## 2017-06-05 ASSESSMENT — PAIN SCALES - GENERAL: PAINLEVEL: NO PAIN (0)

## 2017-06-05 NOTE — PATIENT INSTRUCTIONS
You were seen at the [facility] clinic today.  You saw Dr. Sheridan  Here are your Instructions:    1.  See us back in 6 months.  2.  Endocrine referral for high blood sugars.      Callie Resendez RN  Nurse Care Coordinator  Office:  [phone] option #1, then #3 & ask for Callie (nurse line)  Fax:  [phone]  After Hours:  [phone]  Appointments:  [phone] option #1, then option #1

## 2017-06-05 NOTE — LETTER
2017      RE: Lucian Oliveira  [address] [address]       2017      Anna Archuleta PA-C    [address]  [address]       RE: Lucian Oliveira   MRN: [id number]   : 1955      Dear Ms. Cuellarmini:      We had the pleasure of seeing Mr. Lucian Oliveira for followup in our Advanced Heart Failure Clinic.  As you know, he is a 61-year-old gentleman with ischemic cardiomyopathy and severe LV systolic dysfunction who is currently on optimal guideline-directed medical therapy.  He returns today for a 6-month followup.        Ms. Oliveira states that he is doing about the same.  He has not had any worsening exertional shortness of breath.  He can walk up to 4 blocks before stopping.  He exercises on a stationary bike 30 minutes on a daily basis.  He swims for 10-15 minutes 3 times a week.  I would currently characterize him as NYHA functional class III.  He has not had any worsening lower extremity swelling or abdominal distention.  His weight has been stable.  He has no lightheadedness, dizziness or syncope.  He has not had any ICD shocks.  He has no PND or orthopnea.  He uses CPAP at nighttime.  He has not had any hospitalizations or ER visits.      He is compliant with his medications and tolerating them all well.      REVIEW OF SYSTEMS:  A detailed 10-point review of systems was obtained as described in the History of Present Illness.  All other systems were reviewed and are negative.      CURRENT MEDICATIONS:   1.  Aspirin 81 mg daily.   2.  Lipitor 40 mg daily.   3.  Coreg 25 mg twice daily.   4.  Cozaar 50 mg once daily.   5.  Aldactone 12.5 mg daily.   6.  Isosorbide mononitrate 30 mg daily.   7.  Lantus insulin 28 units at bedtime.   8.  Glipizide XL 10 mg once a day.   9.  Lasix 40 mg daily.      PHYSICAL EXAMINATION:  He was comfortable.  He was in no apparent distress.  His blood pressure was 110/69, pulse rate was 87, and  respiratory rate was 16.  Weight was 199 pounds.  He was 5 feet 6 inches tall.  His BMI was 32.  His oxygen saturation was 97% on room air.  He had no pallor, cyanosis or jaundice.  His neck exam revealed no JVD, thyromegaly or carotid bruit.  Carotids were 1+ bilaterally.  He had trace bilateral lower extremity edema.  His extremities were warm and well perfused.  Cardiac auscultation revealed normal S1 and S2 with no murmur, rub or gallop.  Auscultation of the lungs revealed equal air entry on both sides with no added sounds.  His abdomen was soft with normal bowel sounds, no tenderness, no rigidity, no guarding.      He had an echocardiogram which I personally reviewed.  He has severe LV systolic dysfunction with regional wall motion abnormality.  His EF was 25%.  He had anteroseptal and lateral wall akinesia.  His left ventricular end-diastolic diameter was 5.6 cm.  He has normal right ventricular size and systolic function.  He had no significant atrial enlargement, mitral regurgitation or tricuspid regurgitation.  He had no pericardial effusion.      He had cardiopulmonary exercise testing where he walked for 8 minutes and 16 seconds.  He achieved anaerobic threshold with an RER of 1.13.  His VO2 was 11.4 mL/kg/minute.  He had no significant increase in blood pressure with exercise.  His VE/VCO2 slope was 36.  When compared to his last cardiopulmonary exercise testing, there was no significant change.  His VO2 is still low at 11.4.     Recent Labs   Lab Test  04/25/17   0741  02/20/17   0738   NA  138  137   POTASSIUM  4.7  4.8   CHLORIDE  103  103   CO2  26  28   ANIONGAP  9  6   GLC  246*  168*   BUN  31*  38*   CR  1.77*  1.81*   BRYANNA  8.9  9.3        IMPRESSION:      Mr. Lucian Oliveira is a 61-year-old with ischemic cardiomyopathy and severe LV systolic dysfunction who is currently on optimal guideline-directed medical therapy.      He is currently functional class III.  He is not significantly volume  overloaded.  He has no evidence of end-organ hypoperfusion.      Mr. Oliveira's VO2 is significantly reduced suggestive of cardiac limitation.  However, he states that he has a decent quality of life.  He is exercising 30 minutes on a stationary bike, and he swims 10-15 minutes 3 times a week.  He is blood type O.  He is on the heavier side.  He would have a long wait time on a cardiac transplant list.  Furthermore, it is difficult for someone of his blood type and size to get a heart transplant as a status 2.  He is not inotrope dependent now. Although it is low, his VO2 has been stable for the last 2 years. Finally, his 1-year mortality based on SHFM is ~  8% ( less than transplant or VAD).  In view of this, we will continue to monitor him on medical therapy for now with close monitoring.        If he were to deteriorate with worsening end-organ perfusional or functional capacity, then he would likely need an LVAD as a bridge to transplant. This would also give us time to control his diabetes.    His diabetes is uncontrolled.  Last hemoglobin A1c is 10.7.  He is currently on Lantus and glipizide.  His diabetes is being managed by Anna Archuleta at [facility].  I took the liberty and referred him to Endocrinology here.  We will notify Ms. Archuleta about the Endocrinology referral.      I recommended a return to clinic in 6 months.  He will call us sooner if he has any further worsening symptoms.  I recommended him to continue the current medications for now.  He appears euvolemic.  It was a pleasure meeting Mr. Lucian Oliveira in our Heart Failure Clinic at the [facility].      Sincerely,      Arvin Sheridan MD   [facility]  Heart Failure, Transplant, and Mechanical Circulatory Support Cardiology   Cardiovascular Division  [facility]   [phone]               D: 06/05/2017 14:45   T: 06/05/2017 15:51   MT: PARI      Name:      BUCK ALFARO   MRN:      [id number]        Account:      [id number]   :      1955           Service Date: 2017      Document: [id number]        Arvin Sheridan MD

## 2017-06-05 NOTE — MR AVS SNAPSHOT
After Visit Summary   6/5/2017    Lucian Oliveira    MRN: [id number]           Patient Information     Date Of Birth          1955        Visit Information        Provider Department      6/5/2017 3:15 PM Arvin Sheridan MD; [employer] [facility]        Today's Diagnoses     Type 2 diabetes mellitus with diabetic nephropathy (H)    -  1      Care Instructions    You were seen at the [facility] clinic today.  You saw Dr. Sheridan  Here are your Instructions:    1.  See us back in 6 months.  2.  Endocrine referral for high blood sugars.      Callie Resendez RN  Nurse Care Coordinator  Office:  [phone] option #1, then #3 & ask for Callie (nurse line)  Fax:  [phone]  After Hours:  [phone]  Appointments:  [phone] option #1, then option #1                        Follow-ups after your visit        Additional Services     ENDOCRINOLOGY ADULT REFERRAL       Your provider has referred you to: endocrine      Please be aware that coverage of these services is subject to the terms and limitations of your health insurance plan.  Call member services at your health plan with any benefit or coverage questions.      Please bring the following to your appointment:    >>   Any x-rays, CTs or MRIs which have been performed.  Contact the facility where they were done to arrange for  prior to your scheduled appointment.    >>   List of current medications   >>   This referral request   >>   Any documents/labs given to you for this referral                  Follow-up notes from your care team     Return in about 6 months (around 12/5/2017) for Dr. Sheridan with labs before same day.      Your next 10 appointments already scheduled     Jun 05, 2017  3:15 PM CDT   RETURN HEART FAILURE with Arvin Sheridan MD   [facility] ([facility])    [address]  [address] [phone]            Jun 06, 2017  9:30 AM CDT   SHORT with Héctor Claros [facility] ([facility])    [address]  [place] [address] [phone]            Jul 05, 2017  7:40 AM CDT   Office Visit with Anna Archuleta PA-C   [facility]    [address] [place] [address]  [place] [id number] [phone]           Bring a current list of meds and any records pertaining to this visit.  For Physicals, please bring immunization records and any forms needing to be filled out.  Please arrive 10 minutes early to complete paperwork.            Sep 19, 2017  1:00 PM CDT   (Arrive by 12:45 PM)   NEW DIABETES with Yue Dowd MD   [facility] ([facility])    [address] [place] [id number] [phone]            Dec 11, 2017  3:30 PM CST   (Arrive by 3:15 PM)   RETURN HEART FAILURE with Arvin Sheridan MD   [facility] ([facility])    [address] [place] [id number] [phone]              Future tests that were ordered for you today     Open Future Orders        Priority Expected Expires Ordered    Basic metabolic panel Routine 6/5/2017 6/6/2017 6/5/2017    N terminal pro BNP outpatient Routine 6/5/2017 6/6/2017 6/5/2017    CBC with platelets Routine 6/5/2017 6/6/2017 6/5/2017    Echocardiogram Complete Routine 6/5/2017 6/9/2017 6/5/2017    ECHO COMPLETE WITH OPTISON Routine 4/1/2017 7/3/2017 11/7/2016            Who to contact     If you have questions or need follow up information about today's clinic visit or your schedule please contact [facility] directly at [phone].  Normal or non-critical lab and imaging results will be communicated to you by MyChart, letter or phone within 4 business days after the clinic has received the results. If you do not hear  "from us within 7 days, please contact the clinic through [employer] or phone. If you have a critical or abnormal lab result, we will notify you by phone as soon as possible.  Submit refill requests through [employer] or call your pharmacy and they will forward the refill request to us. Please allow 3 business days for your refill to be completed.          Additional Information About Your Visit        [employer] Information     [employer] gives you secure access to your electronic health record. If you see a primary care provider, you can also send messages to your care team and make appointments. If you have questions, please call your primary care clinic.  If you do not have a primary care provider, please call [phone] and they will assist you.        Care EveryWhere ID     This is your Care EveryWhere ID. This could be used by other organizations to access your [facility] medical records  [id number]        Your Vitals Were     Pulse Height Pulse Oximetry BMI (Body Mass Index)          87 1.676 m (5' 6\") 97% 32.12 kg/m2         Blood Pressure from Last 3 Encounters:   06/05/17 110/69   05/04/17 110/56   04/27/17 110/70    Weight from Last 3 Encounters:   06/05/17 90.3 kg (199 lb)   04/27/17 88 kg (194 lb)   04/26/17 88.2 kg (194 lb 8 oz)              We Performed the Following     ENDOCRINOLOGY ADULT REFERRAL          Today's Medication Changes          These changes are accurate as of: 6/5/17  3:05 PM.  If you have any questions, ask your nurse or doctor.               These medicines have changed or have updated prescriptions.        Dose/Directions    insulin glargine 100 UNIT/ML injection   This may have changed:  how much to take   Used for:  Type 2 diabetes mellitus with diabetic nephropathy, with long-term current use of insulin (H)        Dose:  28 Units   Inject 28 Units Subcutaneous At Bedtime   Quantity:  18 mL   Refills:  0                Primary Care Provider Office Phone # Fax #    Anna Archuleta PA-C " [phone]       [facility] [place] [address]  [facility] [address]        Thank you!     Thank you for choosing [facility]  for your care. Our goal is always to provide you with excellent care. Hearing back from our patients is one way we can continue to improve our services. Please take a few minutes to complete the written survey that you may receive in the mail after your visit with us. Thank you!             Your Updated Medication List - Protect others around you: Learn how to safely use, store and throw away your medicines at www.disposemymeds.org.          This list is accurate as of: 6/5/17  3:05 PM.  Always use your most recent med list.                   Brand Name Dispense Instructions for use    ACCU-CHEK SMARTVIEW test strip   Generic drug:  blood glucose monitoring     100 each    USE TO TEST BLOOD SUGAR TWO TIMES A DAY OR AS DIRECTED       aspirin 81 MG tablet     30 tablet    Take 1 tablet (81 mg) by mouth daily       atorvastatin 40 MG tablet    LIPITOR    90 tablet    TAKE ONE TABLET BY MOUTH EVERY DAY       blood glucose monitoring meter device kit     1 kit    Use to test blood sugar 3-4 times daily, as directed.       carvedilol 25 MG tablet    COREG    180 tablet    TAKE ONE TABLET BY MOUTH TWICE A DAY WITH MEALS       clopidogrel 75 MG tablet    PLAVIX    90 tablet    TAKE ONE TABLET BY MOUTH EVERY DAY       furosemide 40 MG tablet    LASIX    180 tablet    Take 1 tablet (40 mg) by mouth daily Take an extra 40mg as needed if weight goes up 2# overnight, or more than 5# in 1 week.       glipiZIDE 10 MG 24 hr tablet    glipiZIDE XL    90 tablet    Take 1 tablet (10 mg) by mouth daily       insulin glargine 100 UNIT/ML injection     18 mL    Inject 28 Units Subcutaneous At Bedtime       insulin pen needle 31G X 5 MM     100 each    Use one needle daily, as directed       isosorbide mononitrate 30 MG 24 hr tablet    IMDUR    45 tablet    TAKE ONE-HALF TABLET BY MOUTH  DAILY       losartan 50 MG tablet    COZAAR    135 tablet    Take 1 tablet (50 mg) by mouth daily       nitroglycerin 0.4 MG sublingual tablet    NITROSTAT    10 tablet    Place 1 tablet (0.4 mg) under the tongue every 5 minutes as needed for chest pain If you are still having symptoms after 3 doses (15 minutes) call 911.       order for DME     1 Units    Auto CPAP 8-15 cmH20       spironolactone 25 MG tablet    ALDACTONE    45 tablet    TAKE ONE-HALF TABLET BY MOUTH DAILY

## 2017-06-05 NOTE — PATIENT INSTRUCTIONS
It was a pleasure to see you in clinic today.  Please do not hesitate to call with any questions or concerns.  You are scheduled for a remote transmission on 9/6/17.  We look forward to seeing you in clinic at your next device check in 6 months.    Jeimy Gipson, RN, MS, CCRN  Electrophysiology Nurse Clinician  [facility]    During Business Hours Please Call:  [phone]  After Hours Please Call:  [phone] - select option #4 and ask for job code [phone]

## 2017-06-05 NOTE — NURSING NOTE
Chief Complaint   Patient presents with     Follow Up For     6 month f/u w/ ADARSH & cpx today,  h/o chronic systolic heart failure secondary to ICM last EF 18%     Vitals were taken and medications were reconciled.     SUZANNE Preston  2:02 PM

## 2017-06-05 NOTE — PROGRESS NOTES
Patient seen in clinic for evaluation and iterative programming of his [employer] single lead ICD per MD orders.  Patient has an appointment to see Dr. Sheridan today.  Normal ICD function.  27 NSVT episodes recorded - 130-193 bpm, 5-25 sec.  Intrinsic rhythm = VS @ 77 bpm.   = <1%.  Estimated battery longevity to ELOISA = 12 years.  Patient reports that he is feeling well and denies complaints.  Plan for patient to send a remote transmission in 3 months and return to clinic in 6 months.    Single lead ICD

## 2017-06-05 NOTE — LETTER
2017      RE: Lucian Oliveira  [address] [facility] [address] [address]       Dear Colleague,    Thank you for the opportunity to participate in the care of your patient, Lucian Oliveira, at the [facility] HEART [facility]. Please see a copy of my visit note below.    2017      Anna Archuleta PA-C    [facility]    [address] [place]  [address]       RE: Lucian Oliveira   MRN: [id number]   : 1955      Dear Ms. Cuellarmini:      We had the pleasure of seeing Mr. Lucian Oliveira for followup in our Advanced Heart Failure Clinic.  As you know, he is a 61-year-old gentleman with ischemic cardiomyopathy and severe LV systolic dysfunction who is currently on optimal guideline-directed medical therapy.  He returns today for a 6-month followup.        Ms. Oliveira states that he is doing about the same.  He has not had any worsening exertional shortness of breath.  He can walk up to 4 blocks before stopping.  He exercises on a stationary bike 30 minutes on a daily basis.  He swims for 10-15 minutes 3 times a week.  I would currently characterize him as NYHA functional class III.  He has not had any worsening lower extremity swelling or abdominal distention.  His weight has been stable.  He has no lightheadedness, dizziness or syncope.  He has not had any ICD shocks.  He has no PND or orthopnea.  He uses CPAP at nighttime.  He has not had any hospitalizations or ER visits.      He is compliant with his medications and tolerating them all well.      REVIEW OF SYSTEMS:  A detailed 10-point review of systems was obtained as described in the History of Present Illness.  All other systems were reviewed and are negative.      CURRENT MEDICATIONS:   1.  Aspirin 81 mg daily.   2.  Lipitor 40 mg daily.   3.  Coreg 25 mg twice daily.   4.  Cozaar 50 mg once daily.   5.  Aldactone 12.5 mg daily.   6.  Isosorbide mononitrate 30 mg daily.   7.  Lantus  insulin 28 units at bedtime.   8.  Glipizide XL 10 mg once a day.   9.  Lasix 40 mg daily.      PHYSICAL EXAMINATION:  He was comfortable.  He was in no apparent distress.  His blood pressure was 110/69, pulse rate was 87, and respiratory rate was 16.  Weight was 199 pounds.  He was 5 feet 6 inches tall.  His BMI was 32.  His oxygen saturation was 97% on room air.  He had no pallor, cyanosis or jaundice.  His neck exam revealed no JVD, thyromegaly or carotid bruit.  Carotids were 1+ bilaterally.  He had trace bilateral lower extremity edema.  His extremities were warm and well perfused.  Cardiac auscultation revealed normal S1 and S2 with no murmur, rub or gallop.  Auscultation of the lungs revealed equal air entry on both sides with no added sounds.  His abdomen was soft with normal bowel sounds, no tenderness, no rigidity, no guarding.      He had an echocardiogram which I personally reviewed.  He has severe LV systolic dysfunction with regional wall motion abnormality.  His EF was 25%.  He had anteroseptal and lateral wall akinesia.  His left ventricular end-diastolic diameter was 5.6 cm.  He has normal right ventricular size and systolic function.  He had no significant atrial enlargement, mitral regurgitation or tricuspid regurgitation.  He had no pericardial effusion.      He had cardiopulmonary exercise testing where he walked for 8 minutes and 16 seconds.  He achieved anaerobic threshold with an RER of 1.13.  His VO2 was 11.4 mL/kg/minute.  He had no significant increase in blood pressure with exercise.  His VE/VCO2 slope was 36.  When compared to his last cardiopulmonary exercise testing, there was no significant change.  His VO2 is still low at 11.4.     Recent Labs   Lab Test  04/25/17   0741  02/20/17   0738   NA  138  137   POTASSIUM  4.7  4.8   CHLORIDE  103  103   CO2  26  28   ANIONGAP  9  6   GLC  246*  168*   BUN  31*  38*   CR  1.77*  1.81*   BRYANNA  8.9  9.3        IMPRESSION:      Mr. Lucian Oliveira  is a 61-year-old with ischemic cardiomyopathy and severe LV systolic dysfunction who is currently on optimal guideline-directed medical therapy.      He is currently functional class III.  He is not significantly volume overloaded.  He has no evidence of end-organ hypoperfusion.      Mr. Oliveira's VO2 is significantly reduced suggestive of cardiac limitation.  However, he states that he has a decent quality of life.  He is exercising 30 minutes on a stationary bike, and he swims 10-15 minutes 3 times a week.  He is blood type O.  He is on the heavier side.  He would have a long wait time on a cardiac transplant list.  Furthermore, it is difficult for someone of his blood type and size to get a heart transplant as a status 2.  He is not inotrope dependent now. Although it is low, his VO2 has been stable for the last 2 years. Finally, his 1-year mortality based on SHFM is ~  8% ( less than transplant or VAD).  In view of this, we will continue to monitor him on medical therapy for now with close monitoring.        If he were to deteriorate with worsening end-organ perfusional or functional capacity, then he would likely need an LVAD as a bridge to transplant. This would also give us time to control his diabetes.    His diabetes is uncontrolled.  Last hemoglobin A1c is 10.7.  He is currently on Lantus and glipizide.  His diabetes is being managed by Anna Archuleta at [facility].  I took the liberty and referred him to Endocrinology here.  We will notify Ms. Archuleta about the Endocrinology referral.      I recommended a return to clinic in 6 months.  He will call us sooner if he has any further worsening symptoms.  I recommended him to continue the current medications for now.  He appears euvolemic.  It was a pleasure meeting Mr. Lucian Oliveira in our Heart Failure Clinic at the [facility].      Sincerely,      Arvin Sheridan MD   [facility]  Heart  Failure, Transplant, and Mechanical Circulatory Support Cardiology   Cardiovascular Division  [facility] Heart   [phone]

## 2017-06-05 NOTE — MR AVS SNAPSHOT
After Visit Summary   6/5/2017    Lucian Oliveira    MRN: [id number]           Patient Information     Date Of Birth          1955        Visit Information        Provider Department      6/5/2017 2:45 PM 1, Leonor Cv Device; ANASTASIA SMITH [facility]        Care Instructions    It was a pleasure to see you in clinic today.  Please do not hesitate to call with any questions or concerns.  You are scheduled for a remote transmission on 9/6/17.  We look forward to seeing you in clinic at your next device check in 6 months.    Jeimy Gipson, RN, MS, CCRN  Electrophysiology Nurse Clinician  [facility]    During Business Hours Please Call:  [phone]  After Hours Please Call:  [phone] - select option #4 and ask for job code [phone]                        Follow-ups after your visit        Follow-up notes from your care team     Return in about 6 months (around 12/5/2017) for ICD Check.      Your next 10 appointments already scheduled     Jun 06, 2017  9:30 AM CDT   SHORT with Héctor [facility] ([facility])    [address] [place] [id number] [phone]            Jul 05, 2017  7:40 AM CDT   Office Visit with Anna Archuleta PA-C   [facility] ([facility]    [address] [place] [phone]           Bring a current list of meds and any records pertaining to this visit.  For Physicals, please bring immunization records and any forms needing to be filled out.  Please arrive 10 minutes early to complete paperwork.            Sep 19, 2017  1:00 PM CDT   (Arrive by 12:45 PM)   NEW DIABETES with Yue Dowd MD   [facility] ([facility])    [address] [address] [phone]            Dec 11, 2017  2:30 PM CST   Lab with  LAB   [facility]    [address]  1st Floor  [facility] [address]   [phone]            Dec 11, 2017  3:00 PM CST   (Arrive by 2:45 PM)   Implanted Defibulator with Uc Cv Device 1   [facility] ([facility])    [address]  [address] [facility] [address]   [phone]            Dec 11, 2017  3:30 PM CST   (Arrive by 3:15 PM)   RETURN HEART FAILURE with Arvin Sheridan MD   [facility] ([facility])    [address]  [address] [facility] [address] [phone]              Future tests that were ordered for you today     Open Future Orders        Priority Expected Expires Ordered    Basic metabolic panel Routine 6/5/2017 6/6/2017 6/5/2017    N terminal pro BNP outpatient Routine 6/5/2017 6/6/2017 6/5/2017    CBC with platelets Routine 6/5/2017 6/6/2017 6/5/2017    Echocardiogram Complete Routine 6/5/2017 6/9/2017 6/5/2017    ECHO COMPLETE WITH OPTISON Routine 4/1/2017 7/3/2017 11/7/2016            Who to contact     If you have questions or need follow up information about today's clinic visit or your schedule please contact [facility] directly at [phone].  Normal or non-critical lab and imaging results will be communicated to you by Semantifyhart, letter or phone within 4 business days after the clinic has received the results. If you do not hear from us within 7 days, please contact the clinic through Swypet or phone. If you have a critical or abnormal lab result, we will notify you by phone as soon as possible.  Submit refill requests through iCeutica or call your pharmacy and they will forward the refill request to us. Please allow 3 business days for your refill to be completed.          Additional Information About Your Visit        iCeutica Information     iCeutica gives you secure access to your electronic health record. If you see a primary care provider, you can also send  messages to your care team and make appointments. If you have questions, please call your primary care clinic.  If you do not have a primary care provider, please call [phone] and they will assist you.        Care EveryWhere ID     This is your Care EveryWhere ID. This could be used by other organizations to access your [facility] medical records  [id number]         Blood Pressure from Last 3 Encounters:   06/05/17 110/69   05/04/17 110/56   04/27/17 110/70    Weight from Last 3 Encounters:   06/05/17 90.3 kg (199 lb)   04/27/17 88 kg (194 lb)   04/26/17 88.2 kg (194 lb 8 oz)              Today, you had the following     No orders found for display         Today's Medication Changes          These changes are accurate as of: 6/5/17  3:18 PM.  If you have any questions, ask your nurse or doctor.               These medicines have changed or have updated prescriptions.        Dose/Directions    insulin glargine 100 UNIT/ML injection   This may have changed:  how much to take   Used for:  Type 2 diabetes mellitus with diabetic nephropathy, with long-term current use of insulin (H)        Dose:  28 Units   Inject 28 Units Subcutaneous At Bedtime   Quantity:  18 mL   Refills:  0                Primary Care Provider Office Phone # Fax #    Anna Archuleta PA-C [phone]       [facility] [place] [address]  [place] [address]        Thank you!     Thank you for choosing [facility]  for your care. Our goal is always to provide you with excellent care. Hearing back from our patients is one way we can continue to improve our services. Please take a few minutes to complete the written survey that you may receive in the mail after your visit with us. Thank you!             Your Updated Medication List - Protect others around you: Learn how to safely use, store and throw away your medicines at www.disposemymeds.org.          This list is accurate as of: 6/5/17  3:18 PM.  Always use your  most recent med list.                   Brand Name Dispense Instructions for use    ACCU-CHEK SMARTVIEW test strip   Generic drug:  blood glucose monitoring     100 each    USE TO TEST BLOOD SUGAR TWO TIMES A DAY OR AS DIRECTED       aspirin 81 MG tablet     30 tablet    Take 1 tablet (81 mg) by mouth daily       atorvastatin 40 MG tablet    LIPITOR    90 tablet    TAKE ONE TABLET BY MOUTH EVERY DAY       blood glucose monitoring meter device kit     1 kit    Use to test blood sugar 3-4 times daily, as directed.       carvedilol 25 MG tablet    COREG    180 tablet    TAKE ONE TABLET BY MOUTH TWICE A DAY WITH MEALS       clopidogrel 75 MG tablet    PLAVIX    90 tablet    TAKE ONE TABLET BY MOUTH EVERY DAY       furosemide 40 MG tablet    LASIX    180 tablet    Take 1 tablet (40 mg) by mouth daily Take an extra 40mg as needed if weight goes up 2# overnight, or more than 5# in 1 week.       glipiZIDE 10 MG 24 hr tablet    glipiZIDE XL    90 tablet    Take 1 tablet (10 mg) by mouth daily       insulin glargine 100 UNIT/ML injection     18 mL    Inject 28 Units Subcutaneous At Bedtime       insulin pen needle 31G X 5 MM     100 each    Use one needle daily, as directed       isosorbide mononitrate 30 MG 24 hr tablet    IMDUR    45 tablet    TAKE ONE-HALF TABLET BY MOUTH DAILY       losartan 50 MG tablet    COZAAR    135 tablet    Take 1 tablet (50 mg) by mouth daily       nitroglycerin 0.4 MG sublingual tablet    NITROSTAT    10 tablet    Place 1 tablet (0.4 mg) under the tongue every 5 minutes as needed for chest pain If you are still having symptoms after 3 doses (15 minutes) call 911.       order for DME     1 Units    Auto CPAP 8-15 cmH20       spironolactone 25 MG tablet    ALDACTONE    45 tablet    TAKE ONE-HALF TABLET BY MOUTH DAILY

## 2017-06-05 NOTE — PROGRESS NOTES
2017      Anna Archuleta PA-C    [facility]    [address] [place]  [address]       RE: Lucian Oliveira   MRN: [id number]   : 1955      Dear Ms. Archuleta:      We had the pleasure of seeing Mr. Lucian Oliveira for followup in our Advanced Heart Failure Clinic.  As you know, he is a 61-year-old gentleman with ischemic cardiomyopathy and severe LV systolic dysfunction who is currently on optimal guideline-directed medical therapy.  He returns today for a 6-month followup.        Ms. Oliveira states that he is doing about the same.  He has not had any worsening exertional shortness of breath.  He can walk up to 4 blocks before stopping.  He exercises on a stationary bike 30 minutes on a daily basis.  He swims for 10-15 minutes 3 times a week.  I would currently characterize him as NYHA functional class III.  He has not had any worsening lower extremity swelling or abdominal distention.  His weight has been stable.  He has no lightheadedness, dizziness or syncope.  He has not had any ICD shocks.  He has no PND or orthopnea.  He uses CPAP at nighttime.  He has not had any hospitalizations or ER visits.      He is compliant with his medications and tolerating them all well.      REVIEW OF SYSTEMS:  A detailed 10-point review of systems was obtained as described in the History of Present Illness.  All other systems were reviewed and are negative.      CURRENT MEDICATIONS:   1.  Aspirin 81 mg daily.   2.  Lipitor 40 mg daily.   3.  Coreg 25 mg twice daily.   4.  Cozaar 50 mg once daily.   5.  Aldactone 12.5 mg daily.   6.  Isosorbide mononitrate 30 mg daily.   7.  Lantus insulin 28 units at bedtime.   8.  Glipizide XL 10 mg once a day.   9.  Lasix 40 mg daily.      PHYSICAL EXAMINATION:  He was comfortable.  He was in no apparent distress.  His blood pressure was 110/69, pulse rate was 87, and respiratory rate was 16.  Weight was 199 pounds.  He was 5 feet 6 inches tall.  His BMI was 32.   His oxygen saturation was 97% on room air.  He had no pallor, cyanosis or jaundice.  His neck exam revealed no JVD, thyromegaly or carotid bruit.  Carotids were 1+ bilaterally.  He had trace bilateral lower extremity edema.  His extremities were warm and well perfused.  Cardiac auscultation revealed normal S1 and S2 with no murmur, rub or gallop.  Auscultation of the lungs revealed equal air entry on both sides with no added sounds.  His abdomen was soft with normal bowel sounds, no tenderness, no rigidity, no guarding.      He had an echocardiogram which I personally reviewed.  He has severe LV systolic dysfunction with regional wall motion abnormality.  His EF was 25%.  He had anteroseptal and lateral wall akinesia.  His left ventricular end-diastolic diameter was 5.6 cm.  He has normal right ventricular size and systolic function.  He had no significant atrial enlargement, mitral regurgitation or tricuspid regurgitation.  He had no pericardial effusion.      He had cardiopulmonary exercise testing where he walked for 8 minutes and 16 seconds.  He achieved anaerobic threshold with an RER of 1.13.  His VO2 was 11.4 mL/kg/minute.  He had no significant increase in blood pressure with exercise.  His VE/VCO2 slope was 36.  When compared to his last cardiopulmonary exercise testing, there was no significant change.  His VO2 is still low at 11.4.     Recent Labs   Lab Test  04/25/17   0741  02/20/17   0738   NA  138  137   POTASSIUM  4.7  4.8   CHLORIDE  103  103   CO2  26  28   ANIONGAP  9  6   GLC  246*  168*   BUN  31*  38*   CR  1.77*  1.81*   BRYANNA  8.9  9.3        IMPRESSION:      Mr. Lucian Oliveira is a 61-year-old with ischemic cardiomyopathy and severe LV systolic dysfunction who is currently on optimal guideline-directed medical therapy.      He is currently functional class III.  He is not significantly volume overloaded.  He has no evidence of end-organ hypoperfusion.      Mr. Oliveira's VO2 is significantly  reduced suggestive of cardiac limitation.  However, he states that he has a decent quality of life.  He is exercising 30 minutes on a stationary bike, and he swims 10-15 minutes 3 times a week.  He is blood type O.  He is on the heavier side.  He would have a long wait time on a cardiac transplant list.  Furthermore, it is difficult for someone of his blood type and size to get a heart transplant as a status 2.  He is not inotrope dependent now. Although it is low, his VO2 has been stable for the last 2 years. Finally, his 1-year mortality based on FM is ~  8% ( less than transplant or VAD).  In view of this, we will continue to monitor him on medical therapy for now with close monitoring.        If he were to deteriorate with worsening end-organ perfusional or functional capacity, then he would likely need an LVAD as a bridge to transplant. This would also give us time to control his diabetes.    His diabetes is uncontrolled.  Last hemoglobin A1c is 10.7.  He is currently on Lantus and glipizide.  His diabetes is being managed by Anna Archuleta at [facility].  I took the liberty and referred him to Endocrinology here.  We will notify Ms. Archuleta about the Endocrinology referral.      I recommended a return to clinic in 6 months.  He will call us sooner if he has any further worsening symptoms.  I recommended him to continue the current medications for now.  He appears euvolemic.  It was a pleasure meeting Mr. Lucian Oliveira in our Heart Failure Clinic at the [facility].      Sincerely,      Arvin Sheridan MD   [facility]  Heart Failure, Transplant, and Mechanical Circulatory Support Cardiology   Cardiovascular Division  [facility]   [phone]               D: 2017 14:45   T: 2017 15:51   MT: PARI      Name:     LUCIAN OLIVEIRA   MRN:      [id number]        Account:      [id number]   :       1955           Service Date: 06/05/2017      Document: [id number]

## 2017-06-06 ENCOUNTER — OFFICE VISIT (OUTPATIENT)
Dept: PHARMACY | Facility: CLINIC | Age: 62
End: 2017-06-06
Payer: COMMERCIAL

## 2017-06-06 VITALS — SYSTOLIC BLOOD PRESSURE: 112 MMHG | DIASTOLIC BLOOD PRESSURE: 64 MMHG

## 2017-06-06 DIAGNOSIS — E11.21 TYPE 2 DIABETES MELLITUS WITH DIABETIC NEPHROPATHY, WITH LONG-TERM CURRENT USE OF INSULIN (H): Chronic | ICD-10-CM

## 2017-06-06 DIAGNOSIS — Z79.4 TYPE 2 DIABETES MELLITUS WITH DIABETIC NEPHROPATHY, WITH LONG-TERM CURRENT USE OF INSULIN (H): Chronic | ICD-10-CM

## 2017-06-06 PROCEDURE — 99606 MTMS BY PHARM EST 15 MIN: CPT | Performed by: PHARMACIST

## 2017-06-06 PROCEDURE — 99607 MTMS BY PHARM ADDL 15 MIN: CPT | Performed by: PHARMACIST

## 2017-06-06 RX ORDER — INSULIN GLARGINE 100 [IU]/ML
30 INJECTION, SOLUTION SUBCUTANEOUS AT BEDTIME
Qty: 15 ML | Refills: 1 | Status: SHIPPED | OUTPATIENT
Start: 2017-06-06 | End: 2017-07-05

## 2017-06-06 NOTE — MR AVS SNAPSHOT
After Visit Summary   6/6/2017    Lucian Oliveira    MRN: [id number]           Patient Information     Date Of Birth          1955        Visit Information        Provider Department      6/6/2017 9:30 AM [facility] located within [facility]; [employer] [facility] MTM        Today's Diagnoses     Type 2 diabetes mellitus with diabetic nephropathy, with long-term current use of insulin (H)          Care Instructions    Recommendations from today's MTM visit:                                                    MTM (medication therapy management) is a service provided by a clinical pharmacist designed to help you get the most of out of your medicines.   Today we reviewed what your medicines are for, how to know if they are working, that your medicines are safe and how to make your medicine regimen as easy as possible.     1. Increase Basaglar to 30 units daily.  You can start this tonight.      Next MTM visit: After you see Anna Archuleta PA-C ask her when she wants you to follow-up with the pharmacist again.      To schedule another MTM appointment, please call the clinic directly or you may call the MTM scheduling line at [phone] or toll-free at [phone].     My Clinical Pharmacist's contact information:                                                      It was a pleasure seeing you today!  Please feel free to contact me with any questions or concerns you have.      Denys Claros, Kia  Medication Therapy Management Provider  Pager (voicemail): [phone]    You may receive a survey about the MTM services you received.  I would appreciate your feedback to help me serve you better in the future. Please fill it out and return it when you can. Your comments will be anonymous.                Follow-ups after your visit        Your next 10 appointments already scheduled     Jul 05, 2017  7:40 AM CDT   Office Visit with Anna Archuleta PA-C   [facility]  ([facility])    [address]  [place] [address] [phone]           Bring a current list of meds and any records pertaining to this visit.  For Physicals, please bring immunization records and any forms needing to be filled out.  Please arrive 10 minutes early to complete paperwork.            Sep 19, 2017  1:00 PM CDT   (Arrive by 12:45 PM)   NEW DIABETES with Yue Dowd MD   [facility] ([facility])    [address] [place] [id number] [phone]            Dec 11, 2017  2:30 PM CST   Lab with UC LAB   [facility] ([facility])    [address] [place] [id number] [phone]            Dec 11, 2017  3:00 PM CST   (Arrive by 2:45 PM)   Implanted Defibulator with Uc Cv Device 1   [facility] ([facility])    [address] [place] [id number] [phone]            Dec 11, 2017  3:30 PM CST   (Arrive by 3:15 PM)   RETURN HEART FAILURE with Arvin Sheridan MD   [facility] ([facility])    [address] [place] [id number] [phone]              Future tests that were ordered for you today     Open Future Orders        Priority Expected Expires Ordered    Basic metabolic panel Routine 6/5/2017 6/6/2017 6/5/2017    N terminal pro BNP outpatient Routine 6/5/2017 6/6/2017 6/5/2017    CBC with platelets Routine 6/5/2017 6/6/2017 6/5/2017    Echocardiogram Complete Routine 6/5/2017 6/9/2017 6/5/2017    ECHO COMPLETE WITH OPTISON Routine 4/1/2017 7/3/2017 11/7/2016            Who to contact     If you have questions or need follow up information about today's clinic visit or your schedule please contact [facility] directly at [phone].  Normal or non-critical lab and imaging results will be communicated to you by Boris  letter or phone within 4 business days after the clinic has received the results. If you do not hear from us within 7 days, please contact the clinic through [employer] or phone. If you have a critical or abnormal lab result, we will notify you by phone as soon as possible.  Submit refill requests through [employer] or call your pharmacy and they will forward the refill request to us. Please allow 3 business days for your refill to be completed.          Additional Information About Your Visit        [employer] Information     [employer] gives you secure access to your electronic health record. If you see a primary care provider, you can also send messages to your care team and make appointments. If you have questions, please call your primary care clinic.  If you do not have a primary care provider, please call [phone] and they will assist you.        Care EveryWhere ID     This is your Care EveryWhere ID. This could be used by other organizations to access your [facility] medical records  [id number]         Blood Pressure from Last 3 Encounters:   06/05/17 110/69   05/04/17 110/56   04/27/17 110/70    Weight from Last 3 Encounters:   06/05/17 199 lb (90.3 kg)   04/27/17 194 lb (88 kg)   04/26/17 194 lb 8 oz (88.2 kg)              Today, you had the following     No orders found for display         Today's Medication Changes          These changes are accurate as of: 6/6/17  9:49 AM.  If you have any questions, ask your nurse or doctor.               These medicines have changed or have updated prescriptions.        Dose/Directions    insulin glargine 100 UNIT/ML injection   This may have changed:  how much to take   Used for:  Type 2 diabetes mellitus with diabetic nephropathy, with long-term current use of insulin (H)        Dose:  30 Units   Inject 30 Units Subcutaneous At Bedtime   Quantity:  15 mL   Refills:  1            Where to get your medicines      These medications were sent to [facility] [place]   [place], [place] - [address]  [address], [place] [address]     Phone:  [phone]     insulin glargine 100 UNIT/ML injection                Primary Care Provider Office Phone # Fax #    Anna Archuleta PA-C [phone]       [facility] [place] [address]  [place] [address]        Thank you!     Thank you for choosing [facility]  for your care. Our goal is always to provide you with excellent care. Hearing back from our patients is one way we can continue to improve our services. Please take a few minutes to complete the written survey that you may receive in the mail after your visit with us. Thank you!             Your Updated Medication List - Protect others around you: Learn how to safely use, store and throw away your medicines at www.disposemymeds.org.          This list is accurate as of: 6/6/17  9:49 AM.  Always use your most recent med list.                   Brand Name Dispense Instructions for use    ACCU-CHEK SMARTVIEW test strip   Generic drug:  blood glucose monitoring     100 each    USE TO TEST BLOOD SUGAR TWO TIMES A DAY OR AS DIRECTED       aspirin 81 MG tablet     30 tablet    Take 1 tablet (81 mg) by mouth daily       atorvastatin 40 MG tablet    LIPITOR    90 tablet    TAKE ONE TABLET BY MOUTH EVERY DAY       blood glucose monitoring meter device kit     1 kit    Use to test blood sugar 3-4 times daily, as directed.       carvedilol 25 MG tablet    COREG    180 tablet    TAKE ONE TABLET BY MOUTH TWICE A DAY WITH MEALS       clopidogrel 75 MG tablet    PLAVIX    90 tablet    TAKE ONE TABLET BY MOUTH EVERY DAY       furosemide 40 MG tablet    LASIX    180 tablet    Take 1 tablet (40 mg) by mouth daily Take an extra 40mg as needed if weight goes up 2# overnight, or more than 5# in 1 week.       glipiZIDE 10 MG 24 hr tablet    glipiZIDE XL    90 tablet    Take 1 tablet (10 mg) by mouth daily       insulin glargine 100 UNIT/ML  injection     15 mL    Inject 30 Units Subcutaneous At Bedtime       insulin pen needle 31G X 5 MM     100 each    Use one needle daily, as directed       isosorbide mononitrate 30 MG 24 hr tablet    IMDUR    45 tablet    TAKE ONE-HALF TABLET BY MOUTH DAILY       losartan 50 MG tablet    COZAAR    135 tablet    Take 1 tablet (50 mg) by mouth daily       nitroglycerin 0.4 MG sublingual tablet    NITROSTAT    10 tablet    Place 1 tablet (0.4 mg) under the tongue every 5 minutes as needed for chest pain If you are still having symptoms after 3 doses (15 minutes) call 911.       order for DME     1 Units    Auto CPAP 8-15 cmH20       spironolactone 25 MG tablet    ALDACTONE    45 tablet    TAKE ONE-HALF TABLET BY MOUTH DAILY

## 2017-06-06 NOTE — Clinical Note
FYI -  Plan: 1. Increase Basaglar to 30 units daily.  Denys Claros, MertD Medication Therapy Management Provider Pager (voicemail): [phone]

## 2017-06-06 NOTE — PROGRESS NOTES
SUBJECTIVE/OBJECTIVE:                Lucian Oliveira is a 61 year old male coming in for a follow-up visit for Medication Therapy Management.  He was referred to me from Anna Archuleta PA-C.  He is joined by his wife and an  for our visit today.    Chief Complaint: Follow up from visit on 17 with Kalyani Zamudio, MertD, BCACP.  Diabetes Follow-up.     Tobacco: No tobacco use   Alcohol: not currently using    Medication Adherence: no issues reported    Diabetes:  Pt currently taking glipizide XL 10 mg daily and Basaglar 28 units daily.   Pt is not experiencing side effects.  He was on metformin in the past but this was discontinued due to renal dysfunction  SMB to 3 times daily.   Ranges (patient reported): 130-160 mg/dL (states mainly in the 130-140s mg/dL); highest was up to 182 mg/dL.  He reports his BG have improved as insulin doses have been increased - questions whether they need to be any lower.   Symptoms of low blood sugar? none. Frequency of hypoglycemia? never.  Recent symptoms of high blood sugar? Fatigue  Eye exam: up to date  Foot exam: up to date  Microalbumin is not < 30 mg/g. Pt is taking an ACEi/ARB.  Aspirin: Taking 81mg daily and denies side effects  Diet/Exercise:  Unchanged - continues to focus on this - can tell that high sugars are related to some of his dietary choices.     Current labs include:  BP Readings from Last 3 Encounters:   17 110/69   17 110/56   17 110/70     Today's Vitals: /64  Lab Results   Component Value Date    A1C 10.5 2017   .  Lab Results   Component Value Date    CHOL 99 2016     Lab Results   Component Value Date    TRIG 225 2016     Lab Results   Component Value Date    HDL 31 2016     Lab Results   Component Value Date    LDL 23 2016       Liver Function Studies -   Recent Labs   Lab Test  17   0746  16   0953  16   0936   PROTTOTAL   --    --   7.7   ALBUMIN   --   3.6  3.7    BILITOTAL   --    --   0.6   ALKPHOS   --    --   134   AST   --    --   16   ALT  39   --   19       Lab Results   Component Value Date    UCRR 112 04/27/2017    MICROL 44 04/27/2017    UMALCR 39.38 (H) 04/27/2017       Last Basic Metabolic Panel:  Lab Results   Component Value Date     04/25/2017      Lab Results   Component Value Date    POTASSIUM 4.7 04/25/2017     Lab Results   Component Value Date    CHLORIDE 103 04/25/2017     Lab Results   Component Value Date    BUN 31 04/25/2017     Lab Results   Component Value Date    CR 1.77 04/25/2017     GFR Estimate   Date Value Ref Range Status   04/25/2017 39 (L) >60 mL/min/1.7m2 Final     Comment:     Non  GFR Calc   02/20/2017 38 (L) >60 mL/min/1.7m2 Final     Comment:     Non  GFR Calc   01/12/2017 37 (L) >60 mL/min/1.7m2 Final     Comment:     Non  GFR Calc     TSH   Date Value Ref Range Status   01/05/2017 1.44 0.40 - 4.00 mU/L Final     Most Recent Immunizations   Administered Date(s) Administered     Influenza (IIV3) 10/15/2012     Influenza Vaccine IM 3yrs+ 4 Valent IIV4 09/14/2016     Pneumococcal 23 valent 04/03/2015     TDAP Vaccine (Adacel) 08/04/2009       ASSESSMENT:              Current medications were reviewed today as discussed above.      Medication Adherence: no issues identified    Diabetes: Needs Improvement. Patient is not meeting A1c goal of < 8%. Self monitoring of blood glucose is not consistently at goal of fasting  mg/dL and post prandial < 180 mg/dL.  Pt would likely benefit from slight increase in basal insulin as he is primarily checking fasting sugars.       PLAN:                  1. Increase Basaglar to 30 units daily.     I spent 30 minutes with this patient today.  All changes were made via collaborative practice agreement with Anna Archuleta. A copy of the visit note was provided to the patient's primary care provider.     Will follow up after upcoming PCP  visit.    The patient was given a summary of these recommendations as an after visit summary.    Denys Claros PharmD  Medication Therapy Management Provider  Pager (voicemail): [phone]

## 2017-06-06 NOTE — PATIENT INSTRUCTIONS
Recommendations from today's MTM visit:                                                    MTM (medication therapy management) is a service provided by a clinical pharmacist designed to help you get the most of out of your medicines.   Today we reviewed what your medicines are for, how to know if they are working, that your medicines are safe and how to make your medicine regimen as easy as possible.     1. Increase Basaglar to 30 units daily.  You can start this tonight.      Next MTM visit: After you see Anna Archuleta PA-C ask her when she wants you to follow-up with the pharmacist again.      To schedule another MTM appointment, please call the clinic directly or you may call the MTM scheduling line at [phone] or toll-free at [phone].     My Clinical Pharmacist's contact information:                                                      It was a pleasure seeing you today!  Please feel free to contact me with any questions or concerns you have.      Denys Claros, Kia  Medication Therapy Management Provider  Pager (voicemail): [phone]    You may receive a survey about the MTM services you received.  I would appreciate your feedback to help me serve you better in the future. Please fill it out and return it when you can. Your comments will be anonymous.

## 2017-06-14 DIAGNOSIS — I10 HYPERTENSION GOAL BP (BLOOD PRESSURE) < 140/90: ICD-10-CM

## 2017-06-14 RX ORDER — CARVEDILOL 25 MG/1
TABLET ORAL
Qty: 180 TABLET | Refills: 3 | OUTPATIENT
Start: 2017-06-14

## 2017-06-14 NOTE — TELEPHONE ENCOUNTER
CARVEDILOL      Last Written Prescription Date: 3-19-17  Last Fill Quantity: 180, # refills: 3    Last Office Visit with G, [facility] or [facility] prescribing provider:  6-6-17   Future Office Visit:    Next 5 appointments (look out 90 days)     Jul 05, 2017  7:40 AM CDT   Office Visit with Anna Archuleta PA-C   [facility] [place] ([facility] [place])    [address]  [place] [id number] [phone]                    BP Readings from Last 3 Encounters:   06/06/17 112/64   06/05/17 110/69   05/04/17 110/56

## 2017-07-05 ENCOUNTER — OFFICE VISIT (OUTPATIENT)
Dept: FAMILY MEDICINE | Facility: CLINIC | Age: 62
End: 2017-07-05
Payer: COMMERCIAL

## 2017-07-05 VITALS
OXYGEN SATURATION: 98 % | SYSTOLIC BLOOD PRESSURE: 113 MMHG | WEIGHT: 197.2 LBS | BODY MASS INDEX: 31.69 KG/M2 | DIASTOLIC BLOOD PRESSURE: 71 MMHG | HEIGHT: 66 IN | HEART RATE: 77 BPM | TEMPERATURE: 98.7 F

## 2017-07-05 DIAGNOSIS — Z12.11 SPECIAL SCREENING FOR MALIGNANT NEOPLASMS, COLON: ICD-10-CM

## 2017-07-05 DIAGNOSIS — R06.2 WHEEZING: ICD-10-CM

## 2017-07-05 DIAGNOSIS — E11.21 TYPE 2 DIABETES MELLITUS WITH DIABETIC NEPHROPATHY, WITH LONG-TERM CURRENT USE OF INSULIN (H): Primary | Chronic | ICD-10-CM

## 2017-07-05 DIAGNOSIS — E78.5 HYPERLIPIDEMIA LDL GOAL <100: Chronic | ICD-10-CM

## 2017-07-05 DIAGNOSIS — N18.30 CKD (CHRONIC KIDNEY DISEASE) STAGE 3, GFR 30-59 ML/MIN (H): ICD-10-CM

## 2017-07-05 DIAGNOSIS — I50.9 CHF (NYHA CLASS II, ACC/AHA STAGE C) (H): ICD-10-CM

## 2017-07-05 DIAGNOSIS — I10 HYPERTENSION GOAL BP (BLOOD PRESSURE) < 140/90: Chronic | ICD-10-CM

## 2017-07-05 DIAGNOSIS — Z79.4 TYPE 2 DIABETES MELLITUS WITH DIABETIC NEPHROPATHY, WITH LONG-TERM CURRENT USE OF INSULIN (H): Primary | Chronic | ICD-10-CM

## 2017-07-05 DIAGNOSIS — I50.22 CHRONIC SYSTOLIC HEART FAILURE (H): ICD-10-CM

## 2017-07-05 LAB
ALBUMIN SERPL-MCNC: 3.3 G/DL (ref 3.4–5)
ALP SERPL-CCNC: 113 U/L (ref 40–150)
ALT SERPL W P-5'-P-CCNC: 28 U/L (ref 0–70)
ANION GAP SERPL CALCULATED.3IONS-SCNC: 8 MMOL/L (ref 3–14)
AST SERPL W P-5'-P-CCNC: 17 U/L (ref 0–45)
BASOPHILS # BLD AUTO: 0 10E9/L (ref 0–0.2)
BASOPHILS NFR BLD AUTO: 0.4 %
BILIRUB SERPL-MCNC: 0.6 MG/DL (ref 0.2–1.3)
BUN SERPL-MCNC: 31 MG/DL (ref 7–30)
CALCIUM SERPL-MCNC: 9.1 MG/DL (ref 8.5–10.1)
CHLORIDE SERPL-SCNC: 102 MMOL/L (ref 94–109)
CHOLEST SERPL-MCNC: 93 MG/DL
CO2 SERPL-SCNC: 27 MMOL/L (ref 20–32)
CREAT SERPL-MCNC: 1.77 MG/DL (ref 0.66–1.25)
DIFFERENTIAL METHOD BLD: ABNORMAL
EOSINOPHIL # BLD AUTO: 0.3 10E9/L (ref 0–0.7)
EOSINOPHIL NFR BLD AUTO: 4 %
ERYTHROCYTE [DISTWIDTH] IN BLOOD BY AUTOMATED COUNT: 13.4 % (ref 10–15)
GFR SERPL CREATININE-BSD FRML MDRD: 39 ML/MIN/1.7M2
GLUCOSE SERPL-MCNC: 128 MG/DL (ref 70–99)
HBA1C MFR BLD: 9.1 % (ref 4.3–6)
HCT VFR BLD AUTO: 39.8 % (ref 40–53)
HDLC SERPL-MCNC: 26 MG/DL
HGB BLD-MCNC: 13.4 G/DL (ref 13.3–17.7)
LDLC SERPL CALC-MCNC: 22 MG/DL
LYMPHOCYTES # BLD AUTO: 1.7 10E9/L (ref 0.8–5.3)
LYMPHOCYTES NFR BLD AUTO: 23.4 %
MCH RBC QN AUTO: 31.2 PG (ref 26.5–33)
MCHC RBC AUTO-ENTMCNC: 33.7 G/DL (ref 31.5–36.5)
MCV RBC AUTO: 93 FL (ref 78–100)
MONOCYTES # BLD AUTO: 0.8 10E9/L (ref 0–1.3)
MONOCYTES NFR BLD AUTO: 10.8 %
NEUTROPHILS # BLD AUTO: 4.5 10E9/L (ref 1.6–8.3)
NEUTROPHILS NFR BLD AUTO: 61.4 %
NONHDLC SERPL-MCNC: 67 MG/DL
PLATELET # BLD AUTO: 183 10E9/L (ref 150–450)
POTASSIUM SERPL-SCNC: 4.4 MMOL/L (ref 3.4–5.3)
PROT SERPL-MCNC: 7.1 G/DL (ref 6.8–8.8)
RBC # BLD AUTO: 4.3 10E12/L (ref 4.4–5.9)
SODIUM SERPL-SCNC: 137 MMOL/L (ref 133–144)
TRIGL SERPL-MCNC: 226 MG/DL
WBC # BLD AUTO: 7.3 10E9/L (ref 4–11)

## 2017-07-05 PROCEDURE — 85025 COMPLETE CBC W/AUTO DIFF WBC: CPT | Performed by: PHYSICIAN ASSISTANT

## 2017-07-05 PROCEDURE — 83036 HEMOGLOBIN GLYCOSYLATED A1C: CPT | Performed by: PHYSICIAN ASSISTANT

## 2017-07-05 PROCEDURE — 80061 LIPID PANEL: CPT | Performed by: PHYSICIAN ASSISTANT

## 2017-07-05 PROCEDURE — 36415 COLL VENOUS BLD VENIPUNCTURE: CPT | Performed by: PHYSICIAN ASSISTANT

## 2017-07-05 PROCEDURE — 99214 OFFICE O/P EST MOD 30 MIN: CPT | Performed by: PHYSICIAN ASSISTANT

## 2017-07-05 PROCEDURE — 80053 COMPREHEN METABOLIC PANEL: CPT | Performed by: PHYSICIAN ASSISTANT

## 2017-07-05 RX ORDER — GLIPIZIDE 10 MG/1
10 TABLET, FILM COATED, EXTENDED RELEASE ORAL DAILY
Qty: 90 TABLET | Refills: 3 | Status: SHIPPED | OUTPATIENT
Start: 2017-07-05 | End: 2017-09-06

## 2017-07-05 RX ORDER — CARVEDILOL 25 MG/1
25 TABLET ORAL 2 TIMES DAILY WITH MEALS
Qty: 180 TABLET | Refills: 3 | Status: SHIPPED | OUTPATIENT
Start: 2017-07-05 | End: 2017-09-06

## 2017-07-05 RX ORDER — INSULIN GLARGINE 100 [IU]/ML
35 INJECTION, SOLUTION SUBCUTANEOUS AT BEDTIME
Qty: 12 ML | Refills: 1 | Status: SHIPPED | OUTPATIENT
Start: 2017-07-05 | End: 2017-09-06

## 2017-07-05 RX ORDER — LOSARTAN POTASSIUM 50 MG/1
50 TABLET ORAL DAILY
Qty: 90 TABLET | Refills: 3 | Status: SHIPPED | OUTPATIENT
Start: 2017-07-05 | End: 2017-09-06

## 2017-07-05 RX ORDER — FUROSEMIDE 40 MG
40 TABLET ORAL DAILY
Qty: 180 TABLET | Refills: 3 | Status: SHIPPED | OUTPATIENT
Start: 2017-07-05 | End: 2017-09-06

## 2017-07-05 NOTE — Clinical Note
Héctor - will you follow up with Lucian in 2-4 weeks. I increased his insulin today. A1c improving. I'm seeing him back in 2 months again.  Dr. Yesica CHIN. Improvements!

## 2017-07-05 NOTE — NURSING NOTE
"Chief Complaint   Patient presents with     Recheck Medication       Initial /76  Pulse 77  Temp 98.7  F (37.1  C) (Oral)  Ht 5' 6\" (1.676 m)  Wt 197 lb 3.2 oz (89.4 kg)  SpO2 98%  BMI 31.83 kg/m2 Estimated body mass index is 31.83 kg/(m^2) as calculated from the following:    Height as of this encounter: 5' 6\" (1.676 m).    Weight as of this encounter: 197 lb 3.2 oz (89.4 kg).  Medication Reconciliation: complete   Ynes Fam MA      "

## 2017-07-05 NOTE — PATIENT INSTRUCTIONS
Increase your Basaglar (insulin) to 35 units nightly.  I will have Héctor reach out and touch base in 2-4 weeks.  Follow up with me in the office in 2 months.  We need to get your A1c under 8%!    Check blood sugars at the following times:  - fasting  - 2 hours after breakfast  - 2 hours after lunch    Aumente lainez Basaglar (insulina) a 35 unidades cada noche.   Yo haré que Héctor llegue y toque la base en 2-4 semanas.   Acompáñame en la oficina en 2 meses.   Necesitamos obtener lainez A1c menos del 8%!     Compruebe los niveles de azúcar en sparkle en los siguientes momentos:   - Ayuno   - 2 horas después del desayuno   - 2 horas después del almuerzo

## 2017-07-05 NOTE — MR AVS SNAPSHOT
After Visit Summary   7/5/2017    Lucian Oliveira    MRN: [id number]           Patient Information     Date Of Birth          1955        Visit Information        Provider Department      7/5/2017 7:30 AM Anna Archuleta PA-C; [employer] [facility]        Today's Diagnoses     Type 2 diabetes mellitus with diabetic nephropathy, with long-term current use of insulin (H)    -  1    Hyperlipidemia LDL goal <100        Hypertension goal BP (blood pressure) < 140/90        Special screening for malignant neoplasms, colon        CHF (NYHA class II, ACC/AHA stage C) (H)        CKD (chronic kidney disease) stage 3, GFR 30-59 ml/min        Chronic systolic heart failure        Wheezing          Care Instructions    Increase your Basaglar (insulin) to 35 units nightly.  I will have Héctor reach out and touch base in 2-4 weeks.  Follow up with me in the office in 2 months.  We need to get your A1c under 8%!    Check blood sugars at the following times:  - fasting  - 2 hours after breakfast  - 2 hours after lunch    Aumente lainez Basaglar (insulina) a 35 unidades cada noche.   Yo haré que Héctor llegue y toque la base en 2-4 semanas.   Acompáñame en la oficina en 2 meses.   Necesitamos obtener lainez A1c menos del 8%!     Compruebe los niveles de azúcar en sparkle en los siguientes momentos:   - Ayuno   - 2 horas después del desayuno   - 2 horas después del almuerzo            Follow-ups after your visit        Your next 10 appointments already scheduled     Sep 19, 2017  1:00 PM CDT   (Arrive by 12:45 PM)   NEW DIABETES with Yue Dowd MD   [facility] ([facility])    [address]  3rd Floor  [place] [address] [phone]            Dec 11, 2017  2:30 PM CST   Lab with  LAB   [facility] ([facility])    [address]  [address] [facility] [id number] [phone]            Dec  11, 2017  3:00 PM CST   (Arrive by 2:45 PM)   Implanted Defibulator with Uc Cv Device 1   [facility] ([facility])    [address] [address] [phone]            Dec 11, 2017  3:30 PM CST   (Arrive by 3:15 PM)   RETURN HEART FAILURE with Arvin Sheridan MD   [facility] ([facility])    [address] [address] [phone]              Future tests that were ordered for you today     Open Future Orders        Priority Expected Expires Ordered    Fecal colorectal cancer screen (FIT) Routine 7/26/2017 9/27/2017 7/5/2017            Who to contact     Normal or non-critical lab and imaging results will be communicated to you by [facility], letter or phone within 4 business days after the clinic has received the results. If you do not hear from us within 7 days, please contact the clinic through [facility] or phone. If you have a critical or abnormal lab result, we will notify you by phone as soon as possible.  Submit refill requests through [employer] or call your pharmacy and they will forward the refill request to us. Please allow 3 business days for your refill to be completed.          If you need to speak with a  for additional information , please call: [phone]             Additional Information About Your Visit        [employer] Information     [employer] gives you secure access to your electronic health record. If you see a primary care provider, you can also send messages to your care team and make appointments. If you have questions, please call your primary care clinic.  If you do not have a primary care provider, please call [phone] and they will assist you.        Care EveryWhere ID     This is your Care EveryWhere ID. This could be used by other organizations to access your [facility] medical records  [id number]        Your Vitals Were     Pulse  "Temperature Height Pulse Oximetry BMI (Body Mass Index)       77 98.7  F (37.1  C) (Oral) 5' 6\" (1.676 m) 98% 31.83 kg/m2        Blood Pressure from Last 3 Encounters:   07/05/17 140/76   06/06/17 112/64   06/05/17 110/69    Weight from Last 3 Encounters:   07/05/17 197 lb 3.2 oz (89.4 kg)   06/05/17 199 lb (90.3 kg)   04/27/17 194 lb (88 kg)              We Performed the Following     CBC with platelets and differential     Comprehensive metabolic panel (BMP + Alb, Alk Phos, ALT, AST, Total. Bili, TP)     Hemoglobin A1c     Lipid Profile with reflex to direct LDL          Today's Medication Changes          These changes are accurate as of: 7/5/17  8:00 AM.  If you have any questions, ask your nurse or doctor.               These medicines have changed or have updated prescriptions.        Dose/Directions    carvedilol 25 MG tablet   Commonly known as:  COREG   This may have changed:  See the new instructions.   Used for:  Hypertension goal BP (blood pressure) < 140/90   Changed by:  Anna Archuleta PA-C        Dose:  25 mg   Take 1 tablet (25 mg) by mouth 2 times daily (with meals)   Quantity:  180 tablet   Refills:  3       furosemide 40 MG tablet   Commonly known as:  LASIX   This may have changed:  additional instructions   Used for:  CHF (NYHA class II, ACC/AHA stage C) (H)   Changed by:  Anna Archuleta PA-C        Dose:  40 mg   Take 1 tablet (40 mg) by mouth daily - Take an extra 40mg as needed if weight goes up 2 pounds overnight, or more than 5 pounds in 1 week.   Quantity:  180 tablet   Refills:  3       insulin glargine 100 UNIT/ML injection   This may have changed:  how much to take   Used for:  Type 2 diabetes mellitus with diabetic nephropathy, with long-term current use of insulin (H)   Changed by:  Anna Archuleta PA-C        Dose:  35 Units   Inject 35 Units Subcutaneous At Bedtime   Quantity:  12 mL   Refills:  1            Where to get your medicines      These medications were " sent to [facility] [place] - [address]  [address], [place] [address]     Phone:  [phone]     carvedilol 25 MG tablet    furosemide 40 MG tablet    glipiZIDE 10 MG 24 hr tablet    insulin glargine 100 UNIT/ML injection    losartan 50 MG tablet                Primary Care Provider Office Phone # Fax #    Anna Archuleta PA-C [phone]       [facility] [address] [facility] [address]        Equal Access to Services     [facility]: Hadii aad ku hadasho Soomaali, waaxda luqadaha, qaybta kaalmada adeegyada, waxay idiin hayaan adeeg kharash laLyleaan . So [facility] [phone].    ATENCIÓN: Si habla español, tiene a lainez disposición servicios gratuitos de asistencia lingüística. [facility] [phone].    We comply with applicable federal civil rights laws and [place] laws. We do not discriminate on the basis of race, color, national origin, age, disability sex, sexual orientation or gender identity.            Thank you!     Thank you for choosing [facility]  for your care. Our goal is always to provide you with excellent care. Hearing back from our patients is one way we can continue to improve our services. Please take a few minutes to complete the written survey that you may receive in the mail after your visit with us. Thank you!             Your Updated Medication List - Protect others around you: Learn how to safely use, store and throw away your medicines at www.disposemymeds.org.          This list is accurate as of: 7/5/17  8:00 AM.  Always use your most recent med list.                   Brand Name Dispense Instructions for use Diagnosis    ACCU-CHEK SMARTVIEW test strip   Generic drug:  blood glucose monitoring     100 each    USE TO TEST BLOOD SUGAR TWO TIMES A DAY OR AS DIRECTED    Type 2 diabetes mellitus with diabetic nephropathy, with long-term current use of insulin (H)       aspirin 81 MG tablet     30  tablet    Take 1 tablet (81 mg) by mouth daily    Ischemic cardiomyopathy       atorvastatin 40 MG tablet    LIPITOR    90 tablet    TAKE ONE TABLET BY MOUTH EVERY DAY    Chronic systolic heart failure (H), Wheezing, CKD (chronic kidney disease) stage 3, GFR 30-59 ml/min, CHF (NYHA class II, ACC/AHA stage C) (H)       blood glucose monitoring meter device kit     1 kit    Use to test blood sugar 3-4 times daily, as directed.    Type 2 diabetes mellitus with diabetic nephropathy, with long-term current use of insulin (H)       carvedilol 25 MG tablet    COREG    180 tablet    Take 1 tablet (25 mg) by mouth 2 times daily (with meals)    Hypertension goal BP (blood pressure) < 140/90       clopidogrel 75 MG tablet    PLAVIX    90 tablet    TAKE ONE TABLET BY MOUTH EVERY DAY    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 (H)       furosemide 40 MG tablet    LASIX    180 tablet    Take 1 tablet (40 mg) by mouth daily - Take an extra 40mg as needed if weight goes up 2 pounds overnight, or more than 5 pounds in 1 week.    CHF (NYHA class II, ACC/AHA stage C) (H)       glipiZIDE 10 MG 24 hr tablet    glipiZIDE XL    90 tablet    Take 1 tablet (10 mg) by mouth daily    Type 2 diabetes mellitus with diabetic nephropathy, with long-term current use of insulin (H)       insulin glargine 100 UNIT/ML injection     12 mL    Inject 35 Units Subcutaneous At Bedtime    Type 2 diabetes mellitus with diabetic nephropathy, with long-term current use of insulin (H)       insulin pen needle 31G X 5 MM     100 each    Use one needle daily, as directed    Type 2 diabetes, HbA1C goal < 8% (H)       isosorbide mononitrate 30 MG 24 hr tablet    IMDUR    45 tablet    TAKE ONE-HALF TABLET BY MOUTH DAILY    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 (H)       losartan 50 MG tablet    COZAAR    90 tablet    Take 1 tablet (50 mg) by  mouth daily    CKD (chronic kidney disease) stage 3, GFR 30-59 ml/min, CHF (NYHA class II, ACC/AHA stage C) (H), Chronic systolic heart failure (H), Wheezing       nitroGLYcerin 0.4 MG sublingual tablet    NITROSTAT    10 tablet    Place 1 tablet (0.4 mg) under the tongue every 5 minutes as needed for chest pain If you are still having symptoms after 3 doses (15 minutes) call 911.    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 (H)       order for DME     1 Units    Auto CPAP 8-15 cmH20        spironolactone 25 MG tablet    ALDACTONE    45 tablet    TAKE ONE-HALF TABLET BY MOUTH DAILY    CHF (NYHA class II, ACC/AHA stage C) (H)

## 2017-07-05 NOTE — PROGRESS NOTES
SUBJECTIVE:                                                    Lucian Oliveira is a 61 year old male who presents to clinic today for the following health issues:    Diabetes Follow-up    Patient is checking blood sugars: twice daily.    Blood sugar testing frequency justification: Uncontrolled diabetes  Results are as follows:         am - 114-232 (highest)         lunchtime - 144-277 (highest)    Diabetic concerns: None     Symptoms of hypoglycemia (low blood sugar): none     Paresthesias (numbness or burning in feet) or sores: No     Date of last diabetic eye exam: 8/15/16      Amount of exercise or physical activity: walking sometimes, gym 3-4x week     Problems taking medications regularly: No    Medication side effects: none    Diet: regular (no restrictions)        Problem list and histories reviewed & adjusted, as indicated.  Additional history: as documented    Patient Active Problem List   Diagnosis     Coronary artery disease involving native coronary artery without angina pectoris     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)     Hyperlipidemia LDL goal <100     Hypertension goal BP (blood pressure) < 140/90     Advanced directives, counseling/discussion     CHF (NYHA class II, ACC/AHA stage C) (H)     CKD (chronic kidney disease) stage 3, GFR 30-59 ml/min     Health Care Home     Automatic implantable cardioverter-defibrillator - [employer] single lead ICD, Not Dependent     Chronic systolic heart failure (H)     Proteinuria     Vitamin D deficiency     OA (osteoarthritis) of knee     Chondromalacia of patella, unspecified laterality     Pain in joint of left shoulder     Tinnitus     Ptosis of right eyelid     Secondary renal hyperparathyroidism (H)     Cortical cataract of both eyes     Moderate nonproliferative diabetic retinopathy, without macular edema, associated with type 2 diabetes mellitus     Non morbid obesity due to excess calories     CHANTEL (obstructive sleep apnea)-  "severe (AHI 30)     Past Surgical History:   Procedure Laterality Date     C CABG, ARTERY-VEIN, THREE  02/2008     IMPLANT AUTOMATIC IMPLANTABLE CARDIOVERTER DEFIBRILLATOR         Social History   Substance Use Topics     Smoking status: Never Smoker     Smokeless tobacco: Never Used      Comment: Never smoked; non-smoking household     Alcohol use No      Comment: rare use     Family History   Problem Relation Age of Onset     DIABETES Brother      DIABETES Sister      DIABETES Sister      Macular Degeneration No family hx of      Glaucoma No family hx of      Myocardial Infarction No family hx of      KIDNEY DISEASE No family hx of            Reviewed and updated as needed this visit by clinical staff  Tobacco  Allergies  Meds  Med Hx  Surg Hx  Fam Hx  Soc Hx      Reviewed and updated as needed this visit by Provider         ROS:  Constitutional, endocrine, cardiac systems are negative, except as otherwise noted.    OBJECTIVE:                                                    /71  Pulse 77  Temp 98.7  F (37.1  C) (Oral)  Ht 5' 6\" (1.676 m)  Wt 197 lb 3.2 oz (89.4 kg)  SpO2 98%  BMI 31.83 kg/m2  Body mass index is 31.83 kg/(m^2).  GENERAL APPEARANCE: alert, no distress and over weight  MS: extremities normal- no gross deformities noted  NEURO: Normal strength and tone  PSYCH: mentation appears normal and affect normal/bright    Diagnostic test results:  Diagnostic Test Results:  Results for orders placed or performed in visit on 07/05/17 (from the past 24 hour(s))   CBC with platelets and differential   Result Value Ref Range    WBC 7.3 4.0 - 11.0 10e9/L    RBC Count 4.30 (L) 4.4 - 5.9 10e12/L    Hemoglobin 13.4 13.3 - 17.7 g/dL    Hematocrit 39.8 (L) 40.0 - 53.0 %    MCV 93 78 - 100 fl    MCH 31.2 26.5 - 33.0 pg    MCHC 33.7 31.5 - 36.5 g/dL    RDW 13.4 10.0 - 15.0 %    Platelet Count 183 150 - 450 10e9/L    Diff Method Automated Method     % Neutrophils 61.4 %    % Lymphocytes 23.4 %    % " Monocytes 10.8 %    % Eosinophils 4.0 %    % Basophils 0.4 %    Absolute Neutrophil 4.5 1.6 - 8.3 10e9/L    Absolute Lymphocytes 1.7 0.8 - 5.3 10e9/L    Absolute Monocytes 0.8 0.0 - 1.3 10e9/L    Absolute Eosinophils 0.3 0.0 - 0.7 10e9/L    Absolute Basophils 0.0 0.0 - 0.2 10e9/L   Hemoglobin A1c   Result Value Ref Range    Hemoglobin A1C 9.1 (H) 4.3 - 6.0 %        ASSESSMENT:                                                      1. Type 2 diabetes mellitus with diabetic nephropathy, with long-term current use of insulin (H)    2. Hyperlipidemia LDL goal <100    3. Hypertension goal BP (blood pressure) < 140/90    4. Special screening for malignant neoplasms, colon    5. CHF (NYHA class II, ACC/AHA stage C) (H)    6. CKD (chronic kidney disease) stage 3, GFR 30-59 ml/min    7. Chronic systolic heart failure    8. Wheezing         PLAN:                                                    A1c has improved. Will increase his Basaglar to 35 units. Will have MT reach out in 2-4 weeks and he'll see me back in 2 months. Discussed the importance of portion control and blood sugar monitoring. New glucose sheets given today.    All other meds renewed today, no changes.    Patient Instructions   Increase your Basaglar (insulin) to 35 units nightly.  I will have Héctor reach out and touch base in 2-4 weeks.  Follow up with me in the office in 2 months.  We need to get your A1c under 8%!    Check blood sugars at the following times:  - fasting  - 2 hours after breakfast  - 2 hours after lunch       The patient was in agreement with the plan today and had no questions or concerns prior to leaving the clinic.     Anna Archuleta PA-C  [facility]

## 2017-07-06 ENCOUNTER — TELEPHONE (OUTPATIENT)
Dept: PEDIATRICS | Facility: CLINIC | Age: 62
End: 2017-07-06

## 2017-07-06 NOTE — TELEPHONE ENCOUNTER
Yes, that was originally prescribed from cardiology. I don't believe the instructions have changed

## 2017-07-06 NOTE — TELEPHONE ENCOUNTER
Patient has rx for furosemide 40mg  To take 1 daily and take an extra tablet prn if weight goes up 2 pounds overnight or more than 5 pounds/week. He has been filling 60 tablets each month. I will speak to him about this when he picks up his medication. I don't see any changes to these directions in his chart. I wanted to make you aware of this so you can follow up as necessary.   Thank you  Robyn Pearson [facility]  Phone [phone]  Fax      [phone]

## 2017-07-10 DIAGNOSIS — E11.21 TYPE 2 DIABETES MELLITUS WITH DIABETIC NEPHROPATHY, WITH LONG-TERM CURRENT USE OF INSULIN (H): Chronic | ICD-10-CM

## 2017-07-10 DIAGNOSIS — Z79.4 TYPE 2 DIABETES MELLITUS WITH DIABETIC NEPHROPATHY, WITH LONG-TERM CURRENT USE OF INSULIN (H): Chronic | ICD-10-CM

## 2017-07-10 NOTE — TELEPHONE ENCOUNTER
ONETOUCH TEST STRIPS         Last Written Prescription Date: 6-26-17  Last Fill Quantity: 100, # refills: 0  Last Office Visit with FMG, UMP or  Health prescribing provider:  7-5-17   Next 5 appointments (look out 90 days)     Sep 05, 2017  8:00 AM CDT   Office Visit with Anna Archuleta PA-C   [facility]    [address]  [place] [address] [phone]                   BP Readings from Last 3 Encounters:   07/05/17 113/71   06/06/17 112/64   06/05/17 110/69     Lab Results   Component Value Date    MICROL 44 04/27/2017     Lab Results   Component Value Date    UMALCR 39.38 04/27/2017     Creatinine   Date Value Ref Range Status   07/05/2017 1.77 (H) 0.66 - 1.25 mg/dL Final   ]  GFR Estimate   Date Value Ref Range Status   07/05/2017 39 (L) >60 mL/min/1.7m2 Final     Comment:     Non  GFR Calc   04/25/2017 39 (L) >60 mL/min/1.7m2 Final     Comment:     Non  GFR Calc   02/20/2017 38 (L) >60 mL/min/1.7m2 Final     Comment:     Non  GFR Calc     GFR Estimate If Black   Date Value Ref Range Status   07/05/2017 47 (L) >60 mL/min/1.7m2 Final     Comment:      GFR Calc   04/25/2017 47 (L) >60 mL/min/1.7m2 Final     Comment:      GFR Calc   02/20/2017 46 (L) >60 mL/min/1.7m2 Final     Comment:      GFR Calc     Lab Results   Component Value Date    CHOL 93 07/05/2017     Lab Results   Component Value Date    HDL 26 07/05/2017     Lab Results   Component Value Date    LDL 22 07/05/2017     Lab Results   Component Value Date    TRIG 226 07/05/2017     Lab Results   Component Value Date    CHOLHDLRATIO 2.6 06/27/2015     Lab Results   Component Value Date    AST 17 07/05/2017     Lab Results   Component Value Date    ALT 28 07/05/2017     Lab Results   Component Value Date    A1C 9.1 07/05/2017    A1C 10.5 04/27/2017    A1C 9.8 02/07/2017    A1C 10.7 01/05/2017    A1C  7.5 09/07/2016     Potassium   Date Value Ref Range Status   07/05/2017 4.4 3.4 - 5.3 mmol/L Final

## 2017-08-04 DIAGNOSIS — E11.21 TYPE 2 DIABETES MELLITUS WITH DIABETIC NEPHROPATHY, WITH LONG-TERM CURRENT USE OF INSULIN (H): Chronic | ICD-10-CM

## 2017-08-04 DIAGNOSIS — Z79.4 TYPE 2 DIABETES MELLITUS WITH DIABETIC NEPHROPATHY, WITH LONG-TERM CURRENT USE OF INSULIN (H): Chronic | ICD-10-CM

## 2017-08-04 NOTE — TELEPHONE ENCOUNTER
One Touch Ultra Blue Strip         Last Written Prescription Date: 6/26/17  Last Fill Quantity: 100, # refills: 0  Last Office Visit with G, P or  Health prescribing provider:  7/5/17   Next 5 appointments (look out 90 days)     Sep 05, 2017  8:00 AM CDT   Office Visit with Anna Archuleta PA-C   [facility] [place] ([facility])    [address]  [place] [address] [phone]            Sep 14, 2017  9:00 AM CDT   Return Visit with ELIJAH Cota CNP   [facility] ([facility])    [address]  [facility] [id number] [phone]                   BP Readings from Last 3 Encounters:   07/05/17 113/71   06/06/17 112/64   06/05/17 110/69     Lab Results   Component Value Date    MICROL 44 04/27/2017     Lab Results   Component Value Date    UMALCR 39.38 04/27/2017     Creatinine   Date Value Ref Range Status   07/05/2017 1.77 (H) 0.66 - 1.25 mg/dL Final   ]  GFR Estimate   Date Value Ref Range Status   07/05/2017 39 (L) >60 mL/min/1.7m2 Final     Comment:     Non  GFR Calc   04/25/2017 39 (L) >60 mL/min/1.7m2 Final     Comment:     Non  GFR Calc   02/20/2017 38 (L) >60 mL/min/1.7m2 Final     Comment:     Non  GFR Calc     GFR Estimate If Black   Date Value Ref Range Status   07/05/2017 47 (L) >60 mL/min/1.7m2 Final     Comment:      GFR Calc   04/25/2017 47 (L) >60 mL/min/1.7m2 Final     Comment:      GFR Calc   02/20/2017 46 (L) >60 mL/min/1.7m2 Final     Comment:      GFR Calc     Lab Results   Component Value Date    CHOL 93 07/05/2017     Lab Results   Component Value Date    HDL 26 07/05/2017     Lab Results   Component Value Date    LDL 22 07/05/2017     Lab Results   Component Value Date    TRIG 226 07/05/2017     Lab Results   Component Value Date    CHOLHDLRATIO 2.6 06/27/2015     Lab  Results   Component Value Date    AST 17 07/05/2017     Lab Results   Component Value Date    ALT 28 07/05/2017     Lab Results   Component Value Date    A1C 9.1 07/05/2017    A1C 10.5 04/27/2017    A1C 9.8 02/07/2017    A1C 10.7 01/05/2017    A1C 7.5 09/07/2016     Potassium   Date Value Ref Range Status   07/05/2017 4.4 3.4 - 5.3 mmol/L Final    b

## 2017-08-04 NOTE — TELEPHONE ENCOUNTER
Prescription approved per [facility] Refill Protocol.  Grace Ricketts RN

## 2017-08-31 ENCOUNTER — PRE VISIT (OUTPATIENT)
Dept: CARDIOLOGY | Facility: CLINIC | Age: 62
End: 2017-08-31

## 2017-08-31 DIAGNOSIS — I50.22 CHRONIC SYSTOLIC HEART FAILURE (H): Primary | ICD-10-CM

## 2017-09-05 NOTE — PROGRESS NOTES
SUBJECTIVE:   Lucian Oliveira is a 61 year old male who presents to clinic today for the following health issues:      Diabetes Follow-up    Patient is checking blood sugars: twice daily.    Blood sugar testing frequency justification: Uncontrolled diabetes  Results are as follows:       am - 120-140s       lunchtime - 140-150        Diabetic concerns: None     Symptoms of hypoglycemia (low blood sugar): none     Paresthesias (numbness or burning in feet) or sores: No   Date of last diabetic eye exam: less than a year ago      Amount of exercise or physical activity: 2-3 days/week for an average of 15-30 minutes    Problems taking medications regularly: No    Medication side effects: none  Diet: regular (no restrictions)        Problem list and histories reviewed & adjusted, as indicated.  Additional history: as documented    Patient Active Problem List   Diagnosis     Coronary artery disease involving native coronary artery without angina pectoris     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)     Hyperlipidemia LDL goal <100     Hypertension goal BP (blood pressure) < 140/90     Advanced directives, counseling/discussion     CHF (NYHA class II, ACC/AHA stage C) (H)     CKD (chronic kidney disease) stage 3, GFR 30-59 ml/min     Health Care Home     Automatic implantable cardioverter-defibrillator - [employer] single lead ICD, Not Dependent     Chronic systolic heart failure (H)     Proteinuria     Vitamin D deficiency     OA (osteoarthritis) of knee     Chondromalacia of patella, unspecified laterality     Pain in joint of left shoulder     Tinnitus     Ptosis of right eyelid     Secondary renal hyperparathyroidism (H)     Cortical cataract of both eyes     Moderate nonproliferative diabetic retinopathy, without macular edema, associated with type 2 diabetes mellitus     Non morbid obesity due to excess calories     CHANTEL (obstructive sleep apnea)- severe (AHI 30)     Past Surgical History:    Procedure Laterality Date     C CABG, ARTERY-VEIN, THREE  02/2008     IMPLANT AUTOMATIC IMPLANTABLE CARDIOVERTER DEFIBRILLATOR         Social History   Substance Use Topics     Smoking status: Never Smoker     Smokeless tobacco: Never Used      Comment: Never smoked; non-smoking household     Alcohol use No      Comment: rare use     Family History   Problem Relation Age of Onset     DIABETES Brother      DIABETES Sister      DIABETES Sister      Macular Degeneration No family hx of      Glaucoma No family hx of      Myocardial Infarction No family hx of      KIDNEY DISEASE No family hx of              Reviewed and updated as needed this visit by clinical staffTobacco  Allergies  Meds  Med Hx  Surg Hx  Fam Hx  Soc Hx      Reviewed and updated as needed this visit by Provider         ROS:  Constitutional, endocrine systems are negative, except as otherwise noted.      OBJECTIVE:                                                    /75  Pulse 85  Temp 98.6  F (37  C) (Oral)  Resp 16  Wt 195 lb (88.5 kg)  BMI 31.47 kg/m2  Body mass index is 31.47 kg/(m^2).  GENERAL APPEARANCE: alert, no distress and over weight  MS: extremities normal- no gross deformities noted  NEURO: Normal strength and tone  PSYCH: mentation appears normal and affect normal/bright    Diagnostic test results:  Diagnostic Test Results:  Results for orders placed or performed in visit on 09/06/17 (from the past 24 hour(s))   Hemoglobin A1c   Result Value Ref Range    Hemoglobin A1C 9.8 (H) 4.3 - 6.0 %          ASSESSMENT:                                                      1. Type 2 diabetes mellitus with diabetic nephropathy, with long-term current use of insulin (H)    2. CHF (NYHA class II, ACC/AHA stage C) (H)    3. CKD (chronic kidney disease) stage 3, GFR 30-59 ml/min    4. Chronic systolic heart failure    5. Wheezing    6. Hypertension goal BP (blood pressure) < 140/90    7. Coronary artery disease involving nonautologous  biological coronary bypass graft with angina pectoris (H)    8. Colon cancer screening         PLAN:                                                    Patient will monitor and record 3 sugars per day and we'll go over these in 3 weeks. Will increase Basaglar by 5 units over the next week. Meds renewed today.     Patient Instructions   Increase your Basaglar insulin to thirty eight units at bedtime for one week. Then increase the dose again to forty units.    Start checking a blood sugar in the morning, two hours after breakfast, and two hours after lunch.    Aumente laienz insulina Basaglar a treinta y ocho unidades a la hora de acostarse marissa shen semana. A continuación, aumentar la dosis de nuevo a cuarenta unidades. Comience a controlar el azúcar en la sparkle por la mañana, dos horas después del desayuno y dos horas después del almuerzo.       The patient was in agreement with the plan today and had no questions or concerns prior to leaving the clinic.     Anna Archuleta PA-C  [facility]

## 2017-09-06 ENCOUNTER — OFFICE VISIT (OUTPATIENT)
Dept: FAMILY MEDICINE | Facility: CLINIC | Age: 62
End: 2017-09-06
Payer: MEDICARE

## 2017-09-06 ENCOUNTER — TELEPHONE (OUTPATIENT)
Dept: PEDIATRICS | Facility: CLINIC | Age: 62
End: 2017-09-06

## 2017-09-06 ENCOUNTER — ALLIED HEALTH/NURSE VISIT (OUTPATIENT)
Dept: CARDIOLOGY | Facility: CLINIC | Age: 62
End: 2017-09-06
Attending: INTERNAL MEDICINE
Payer: MEDICARE

## 2017-09-06 VITALS
HEART RATE: 85 BPM | SYSTOLIC BLOOD PRESSURE: 107 MMHG | RESPIRATION RATE: 16 BRPM | WEIGHT: 195 LBS | BODY MASS INDEX: 31.47 KG/M2 | DIASTOLIC BLOOD PRESSURE: 75 MMHG | TEMPERATURE: 98.6 F

## 2017-09-06 DIAGNOSIS — I50.22 CHRONIC SYSTOLIC HEART FAILURE (H): ICD-10-CM

## 2017-09-06 DIAGNOSIS — E11.21 TYPE 2 DIABETES MELLITUS WITH DIABETIC NEPHROPATHY, WITH LONG-TERM CURRENT USE OF INSULIN (H): Primary | Chronic | ICD-10-CM

## 2017-09-06 DIAGNOSIS — I25.739 CORONARY ARTERY DISEASE INVOLVING NONAUTOLOGOUS BIOLOGICAL CORONARY BYPASS GRAFT WITH ANGINA PECTORIS (H): ICD-10-CM

## 2017-09-06 DIAGNOSIS — Z79.4 TYPE 2 DIABETES MELLITUS WITH DIABETIC NEPHROPATHY, WITH LONG-TERM CURRENT USE OF INSULIN (H): Primary | Chronic | ICD-10-CM

## 2017-09-06 DIAGNOSIS — I10 HYPERTENSION GOAL BP (BLOOD PRESSURE) < 140/90: Chronic | ICD-10-CM

## 2017-09-06 DIAGNOSIS — I50.22 CHRONIC SYSTOLIC HEART FAILURE (H): Primary | Chronic | ICD-10-CM

## 2017-09-06 DIAGNOSIS — N18.30 CKD (CHRONIC KIDNEY DISEASE) STAGE 3, GFR 30-59 ML/MIN (H): ICD-10-CM

## 2017-09-06 DIAGNOSIS — Z12.11 COLON CANCER SCREENING: ICD-10-CM

## 2017-09-06 DIAGNOSIS — R06.2 WHEEZING: ICD-10-CM

## 2017-09-06 DIAGNOSIS — I50.9 CHF (NYHA CLASS II, ACC/AHA STAGE C) (H): ICD-10-CM

## 2017-09-06 LAB — HBA1C MFR BLD: 9.8 % (ref 4.3–6)

## 2017-09-06 PROCEDURE — 83036 HEMOGLOBIN GLYCOSYLATED A1C: CPT | Performed by: PHYSICIAN ASSISTANT

## 2017-09-06 PROCEDURE — 93296 REM INTERROG EVL PM/IDS: CPT | Mod: ZF

## 2017-09-06 PROCEDURE — 99214 OFFICE O/P EST MOD 30 MIN: CPT | Performed by: PHYSICIAN ASSISTANT

## 2017-09-06 PROCEDURE — 36415 COLL VENOUS BLD VENIPUNCTURE: CPT | Performed by: PHYSICIAN ASSISTANT

## 2017-09-06 PROCEDURE — 93295 DEV INTERROG REMOTE 1/2/MLT: CPT | Performed by: INTERNAL MEDICINE

## 2017-09-06 RX ORDER — INSULIN GLARGINE 100 [IU]/ML
40 INJECTION, SOLUTION SUBCUTANEOUS AT BEDTIME
Qty: 12 ML | Refills: 1 | Status: SHIPPED | OUTPATIENT
Start: 2017-09-06 | End: 2017-09-08

## 2017-09-06 RX ORDER — LOSARTAN POTASSIUM 50 MG/1
50 TABLET ORAL DAILY
Qty: 90 TABLET | Refills: 3 | Status: SHIPPED | OUTPATIENT
Start: 2017-09-06 | End: 2018-06-05

## 2017-09-06 RX ORDER — ISOSORBIDE MONONITRATE 30 MG/1
45 TABLET, EXTENDED RELEASE ORAL DAILY
Qty: 135 TABLET | Refills: 3 | Status: SHIPPED | OUTPATIENT
Start: 2017-09-06 | End: 2018-06-05

## 2017-09-06 RX ORDER — CLOPIDOGREL BISULFATE 75 MG/1
75 TABLET ORAL DAILY
Qty: 90 TABLET | Refills: 3 | Status: SHIPPED | OUTPATIENT
Start: 2017-09-06 | End: 2018-06-05

## 2017-09-06 RX ORDER — FUROSEMIDE 40 MG
40 TABLET ORAL DAILY
Qty: 180 TABLET | Refills: 3 | Status: SHIPPED | OUTPATIENT
Start: 2017-09-06 | End: 2018-08-16

## 2017-09-06 RX ORDER — GLIPIZIDE 10 MG/1
10 TABLET, FILM COATED, EXTENDED RELEASE ORAL DAILY
Qty: 90 TABLET | Refills: 3 | Status: SHIPPED | OUTPATIENT
Start: 2017-09-06 | End: 2018-06-14

## 2017-09-06 RX ORDER — CARVEDILOL 25 MG/1
25 TABLET ORAL 2 TIMES DAILY WITH MEALS
Qty: 180 TABLET | Refills: 3 | Status: SHIPPED | OUTPATIENT
Start: 2017-09-06 | End: 2018-08-16

## 2017-09-06 RX ORDER — SPIRONOLACTONE 25 MG/1
37.5 TABLET ORAL DAILY
Qty: 135 TABLET | Refills: 3 | Status: SHIPPED | OUTPATIENT
Start: 2017-09-06 | End: 2018-04-30 | Stop reason: DRUGHIGH

## 2017-09-06 NOTE — PROGRESS NOTES
Scheduled [facility] remote ICD transmission received and reviewed. Device transmission sent per MD orders. His presenting rhythm is regular VS @ 70 bpm. 10 NSVT episodes recorded. Normal device function. = 0%. Lead trends appear stable. Battery estimates 12 years to ELOISA. Pt's son notified of transmission results, via VM. Plan for pt to RTC in 3 months as scheduled.  Remote ICD transmission

## 2017-09-06 NOTE — TELEPHONE ENCOUNTER
Prior authorization required for : Basaglar  Insurance plan: [place] Medicaid  Patient Id: [id number]  Help Desk Number: [phone]    Please fax over an alternative medication to the pharmacy or if you are going to pursue a prior authorization please contact the pharmacy and patient when approved. Thanks!    Thank you  Maribell Barros CPht    [facility] [place]  Phone: [phone]  Fax: [phone]

## 2017-09-06 NOTE — MR AVS SNAPSHOT
After Visit Summary   9/6/2017    Lucian Oliveira    MRN: [id number]           Patient Information     Date Of Birth          1955        Visit Information        Provider Department      9/6/2017 8:15 AM Anna Archuleta PA-C; [employer] [facility] [place]        Today's Diagnoses     Type 2 diabetes mellitus with diabetic nephropathy, with long-term current use of insulin (H)    -  1    CHF (NYHA class II, ACC/AHA stage C) (H)        CKD (chronic kidney disease) stage 3, GFR 30-59 ml/min        Chronic systolic heart failure        Wheezing        Hypertension goal BP (blood pressure) < 140/90        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 (H)          Care Instructions    Increase your Basaglar insulin to thirty eight units at bedtime for one week. Then increase the dose again to forty units.    Start checking a blood sugar in the morning, two hours after breakfast, and two hours after lunch.    Aumente lainez insulina Basaglar a treinta y ocho unidades a la hora de acostarse marissa shen semana. A continuación, aumentar la dosis de nuevo a cuarenta unidades. Comience a controlar el azúcar en la sparkle por la mañana, dos horas después del desayuno y dos horas después del almuerzo.            Follow-ups after your visit        Your next 10 appointments already scheduled     Sep 13, 2017  7:00 AM CDT   LAB with [facility] LAB   [facility] ([place] [id number] [facility] [address]   [phone]           Patient must bring picture ID. Patient should be prepared to give a urine specimen  Please do not eat 10-12 hours before your appointment if you are coming in fasting for labs on lipids, cholesterol, or glucose (sugar). Pregnant women should follow their Care Team instructions. Water with medications is okay. Do not drink coffee or other fluids. If you have concerns about  taking  your medications, please ask at office or if scheduling via [employer], send a message by clicking on Secure Messaging, Message Your Care Team.            Sep 14, 2017  9:00 AM CDT   Return Visit with ELIJAH Cota CNP   [facility] ([facility])    [address] [place] [address] [address] [facility]  [place] [address]   [phone]            Sep 19, 2017  1:00 PM CDT   (Arrive by 12:45 PM)   NEW DIABETES with Yue Dowd MD   [facility])    [address] [place] [id number] [phone]            Sep 27, 2017  9:00 AM CDT   Office Visit with Anna Archuleta PA-C   [facility] ([facility])    [id number] [place] [id number] [phone]           Bring a current list of meds and any records pertaining to this visit. For Physicals, please bring immunization records and any forms needing to be filled out. Please arrive 10 minutes early to complete paperwork.            Dec 11, 2017  2:30 PM CST   Lab with UC LAB   [facility])    [address] [place] [id number] [phone]            Dec 11, 2017  3:00 PM CST   (Arrive by 2:45 PM)   Implanted Defibulator with Uc Cv Device 1   [facility] ([facility])    [address] [place] [id number] [phone]            Dec 11, 2017  3:30 PM CST   (Arrive by 3:15 PM)   RETURN HEART FAILURE with Arvin Sheridan MD   [facility] ([facility])    [address] [place] [address] [phone]              Who to contact     Normal or non-critical lab and imaging results will be communicated to you by MyChart, letter or phone within 4 business days after the clinic has received the results. If you do not hear from  us within 7 days, please contact the clinic through [employer] or phone. If you have a critical or abnormal lab result, we will notify you by phone as soon as possible.  Submit refill requests through [employer] or call your pharmacy and they will forward the refill request to us. Please allow 3 business days for your refill to be completed.          If you need to speak with a  for additional information , please call: [phone]             Additional Information About Your Visit        [employer] Information     [employer] gives you secure access to your electronic health record. If you see a primary care provider, you can also send messages to your care team and make appointments. If you have questions, please call your primary care clinic.  If you do not have a primary care provider, please call [phone] and they will assist you.        Care EveryWhere ID     This is your Care EveryWhere ID. This could be used by other organizations to access your [place] medical records  [id number]        Your Vitals Were     Pulse Temperature Respirations BMI (Body Mass Index)          85 98.6  F (37  C) (Oral) 16 31.47 kg/m2         Blood Pressure from Last 3 Encounters:   09/06/17 107/75   07/05/17 113/71   06/06/17 112/64    Weight from Last 3 Encounters:   09/06/17 195 lb (88.5 kg)   07/05/17 197 lb 3.2 oz (89.4 kg)   06/05/17 199 lb (90.3 kg)              We Performed the Following     Hemoglobin A1c        Primary Care Provider Office Phone # Fax #    Anna Archuleta PA-C [phone]       [address] [place] [address]        Equal Access to Services     [facility] : Hadii aad ku hadasho Soadina, waaxda luqadaha, qaybta kaalmada burt, joana pulido. So [facility] [phone].    ATENCIÓN: Si habla español, tiene a lainez disposición servicios gratuitos de asistencia lingüística. Llame al [phone].    We comply with applicable federal civil rights  laws and [place] laws. We do not discriminate on the basis of race, color, national origin, age, disability sex, sexual orientation or gender identity.            Thank you!     Thank you for choosing [facility]  for your care. Our goal is always to provide you with excellent care. Hearing back from our patients is one way we can continue to improve our services. Please take a few minutes to complete the written survey that you may receive in the mail after your visit with us. Thank you!             Your Updated Medication List - Protect others around you: Learn how to safely use, store and throw away your medicines at www.disposemymeds.org.          This list is accurate as of: 9/6/17  9:11 AM.  Always use your most recent med list.                   Brand Name Dispense Instructions for use Diagnosis    aspirin 81 MG tablet     30 tablet    Take 1 tablet (81 mg) by mouth daily    Ischemic cardiomyopathy       atorvastatin 40 MG tablet    LIPITOR    90 tablet    TAKE ONE TABLET BY MOUTH EVERY DAY    Chronic systolic heart failure (H), Wheezing, CKD (chronic kidney disease) stage 3, GFR 30-59 ml/min, CHF (NYHA class II, ACC/AHA stage C) (H)       blood glucose monitoring meter device kit     1 kit    Use to test blood sugar 3-4 times daily, as directed.    Type 2 diabetes mellitus with diabetic nephropathy, with long-term current use of insulin (H)       carvedilol 25 MG tablet    COREG    180 tablet    Take 1 tablet (25 mg) by mouth 2 times daily (with meals)    Hypertension goal BP (blood pressure) < 140/90       clopidogrel 75 MG tablet    PLAVIX    90 tablet    TAKE ONE TABLET BY MOUTH EVERY DAY    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 (H)       furosemide 40 MG tablet    LASIX    180 tablet    Take 1 tablet (40 mg) by mouth daily - Take an extra 40mg as needed if weight goes up 2 pounds overnight, or more than 5 pounds in 1 week.     CHF (NYHA class II, ACC/AHA stage C) (H)       glipiZIDE 10 MG 24 hr tablet    glipiZIDE XL    90 tablet    Take 1 tablet (10 mg) by mouth daily    Type 2 diabetes mellitus with diabetic nephropathy, with long-term current use of insulin (H)       insulin glargine 100 UNIT/ML injection     12 mL    Inject 35 Units Subcutaneous At Bedtime    Type 2 diabetes mellitus with diabetic nephropathy, with long-term current use of insulin (H)       insulin pen needle 31G X 5 MM     100 each    Use one needle daily, as directed    Type 2 diabetes, HbA1C goal < 8% (H)       isosorbide mononitrate 30 MG 24 hr tablet    IMDUR    45 tablet    TAKE ONE-HALF TABLET BY MOUTH DAILY    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 (H)       * ONE TOUCH ULTRA test strip   Generic drug:  blood glucose monitoring     100 each    USE TO TEST BLOOD SUGAR TWO TIMES A DAY OR AS DIRECTED    Type 2 diabetes mellitus with diabetic nephropathy, with long-term current use of insulin (H)       * ONE TOUCH ULTRA test strip   Generic drug:  blood glucose monitoring     100 each    USE TO TEST BLOOD SUGAR TWO TIMES A DAY OR AS DIRECTED    Type 2 diabetes mellitus with diabetic nephropathy, with long-term current use of insulin (H)       order for DME     1 Units    Auto CPAP 8-15 cmH20        spironolactone 25 MG tablet    ALDACTONE     45 tablet    TAKE ONE-HALF TABLET BY MOUTH DAILY    CHF (NYHA class II, ACC/AHA stage C) (H)       * Notice:  This list has 2 medication(s) that are the same as other medications prescribed for you. Read the directions carefully, and ask your doctor or other care provider to review them with you.

## 2017-09-06 NOTE — PATIENT INSTRUCTIONS
Increase your Basaglar insulin to thirty eight units at bedtime for one week. Then increase the dose again to forty units.    Start checking a blood sugar in the morning, two hours after breakfast, and two hours after lunch.    Aumente lainez insulina Basaglar a treinta y ocho unidades a la hora de acostarse marissa shen semana. A continuación, aumentar la dosis de nuevo a cuarenta unidades. Comience a controlar el azúcar en la sparkle por la mañana, dos horas después del desayuno y dos horas después del almuerzo.

## 2017-09-06 NOTE — MR AVS SNAPSHOT
After Visit Summary   9/6/2017    Lucian Oliveira    MRN: [id number]           Patient Information     Date Of Birth          1955        Visit Information        Provider Department      9/6/2017 6:00 AM [facility]        Today's Diagnoses     Chronic systolic heart failure (H)    -  1       Follow-ups after your visit        Your next 10 appointments already scheduled     Sep 13, 2017  7:00 AM CDT   LAB with FZ LAB   [facility] ([facility])    [address] [address] [phone]           Patient must bring picture ID. Patient should be prepared to give a urine specimen  Please do not eat 10-12 hours before your appointment if you are coming in fasting for labs on lipids, cholesterol, or glucose (sugar). Pregnant women should follow their Care Team instructions. Water with medications is okay. Do not drink coffee or other fluids. If you have concerns about taking  your medications, please ask at office or if scheduling via [employer], send a message by clicking on Secure Messaging, Message Your Care Team.            Sep 14, 2017  9:00 AM CDT   Return Visit with ELIJAH Cota CNP   [facility] AT [facility] ([facility])    [address] [facility] [address] [phone]            Sep 19, 2017  1:00 PM CDT   (Arrive by 12:45 PM)   NEW DIABETES with Yue Dowd MD   [facility] ([facility])    [address]  [address] [facility] [id number] [phone]            Sep 27, 2017  9:00 AM CDT   Office Visit with Anna Archuleta PA-C   [facility] [place] ([facility])    [address] [address] [phone]           Bring a current list of meds and any records pertaining to this visit. For Physicals, please bring immunization records and any forms needing to  be filled out. Please arrive 10 minutes early to complete paperwork.            Dec 11, 2017  2:30 PM CST   Lab with UC LAB   [facility] ([facility])    [address]  [address] [facility] [address] [phone]            Dec 11, 2017  3:00 PM CST   (Arrive by 2:45 PM)   Implanted Defibulator with Uc Cv Device 1   [facility] ([facility])    [address]  [address] [facility] [address] [phone]            Dec 11, 2017  3:30 PM CST   (Arrive by 3:15 PM)   RETURN HEART FAILURE with Arvin Sheridan MD   [facility] ([facility])    [address]  [address] [facility] [id number] [phone]              Future tests that were ordered for you today     Open Future Orders        Priority Expected Expires Ordered    Fecal colorectal cancer screen (FIT) Routine 9/27/2017 11/29/2017 9/6/2017            Who to contact     If you have questions or need follow up information about today's clinic visit or your schedule please contact [facility] directly at [phone].  Normal or non-critical lab and imaging results will be communicated to you by MyChart, letter or phone within 4 business days after the clinic has received the results. If you do not hear from us within 7 days, please contact the clinic through [employer] or phone. If you have a critical or abnormal lab result, we will notify you by phone as soon as possible.  Submit refill requests through [employer] or call your pharmacy and they will forward the refill request to us. Please allow 3 business days for your refill to be completed.          Additional Information About Your Visit        [employer] Information     [employer] gives you secure access to your electronic health record. If you see a primary care provider, you can also send messages to your care team and make appointments. If you have questions, please call  your primary care clinic.  If you do not have a primary care provider, please call [phone] and they will assist you.        Care EveryWhere ID     This is your Care EveryWhere ID. This could be used by other organizations to access your [place] medical records  [id number]         Blood Pressure from Last 3 Encounters:   09/06/17 107/75   07/05/17 113/71   06/06/17 112/64    Weight from Last 3 Encounters:   09/06/17 88.5 kg (195 lb)   07/05/17 89.4 kg (197 lb 3.2 oz)   06/05/17 90.3 kg (199 lb)              We Performed the Following     INTERROGATION DEVICE EVAL REMOTE, PACER/ICD          Today's Medication Changes          These changes are accurate as of: 9/6/17 11:26 AM.  If you have any questions, ask your nurse or doctor.               These medicines have changed or have updated prescriptions.        Dose/Directions    clopidogrel 75 MG tablet   Commonly known as:  PLAVIX   This may have changed:  See the new instructions.   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)   Changed by:  Anna Archuleta PA-C        Dose:  75 mg   Take 1 tablet (75 mg) by mouth daily   Quantity:  90 tablet   Refills:  3       insulin glargine 100 UNIT/ML injection   This may have changed:  how much to take   Used for:  Type 2 diabetes mellitus with diabetic nephropathy, with long-term current use of insulin (H)   Changed by:  Anna Archuleta PA-C        Dose:  40 Units   Inject 40 Units Subcutaneous At Bedtime   Quantity:  12 mL   Refills:  1       isosorbide mononitrate 30 MG 24 hr tablet   Commonly known as:  IMDUR   This may have changed:  See the new instructions.   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 Make sure you understand how and when to take each.   Used for:  Type 2 diabetes mellitus with diabetic nephropathy, with long-term current use of insulin (H)   Generic drug:  blood glucose monitoring   Changed by:  Anna Archuleta PA-C        USE TO TEST BLOOD SUGAR TWO TIMES A DAY OR AS DIRECTED   Quantity:  100 each   Refills:  1       * blood glucose monitoring test strip   Commonly known as:  ONE TOUCH ULTRA   This may have changed:  See the new instructions.   Used for:  Type 2 diabetes mellitus with diabetic nephropathy, with long-term current use of insulin (H)   Changed by:  Anna Archuleta PA-C        Use to test blood sugar 3 times daily or as directed.   Quantity:  100 each   Refills:  3       spironolactone 25 MG tablet   Commonly known as:  ALDACTONE   This may have changed:  See the new instructions.   Used for:  CHF (NYHA class II, ACC/AHA stage C) (H)   Changed by:  Anna Archuleta PA-C        Dose:  37.5 mg   Take 1.5 tablets (37.5 mg) by mouth daily   Quantity:  135 tablet   Refills:  3       * Notice:  This list has 2 medication(s) that are the same as other medications prescribed for you. Read the directions carefully, and ask your doctor or other care provider to review them with you.         Where to get your medicines      These medications were sent to [facility] - [address]  [address], [place] [address]     Phone:  [phone]     blood glucose monitoring test strip    carvedilol 25 MG tablet    clopidogrel 75 MG tablet    furosemide 40 MG tablet    glipiZIDE 10 MG 24 hr tablet    insulin glargine 100 UNIT/ML injection    isosorbide mononitrate 30 MG 24 hr tablet    losartan 50 MG tablet    spironolactone 25 MG tablet                Primary Care Provider Office Phone # Fax #    Anna Archuleta PA-C [phone]  [phone]       [address] [place] [address]        Equal Access to Services     TAMMY RUSH : Hadii aad ku hadlindao Somackenzieali, waaxda luqadaha, qaybta kaalmada burt, joana ramiroin hayaailya castanedayoung flynn lamalilya pulido. So [facility] [phone].    ATENCIÓN: Si habla español, tiene a lainez disposición servicios gratuitos de asistencia lingüística. Llame al [phone].    We comply with applicable federal civil rights laws and [place] laws. We do not discriminate on the basis of race, color, national origin, age, disability sex, sexual orientation or gender identity.            Thank you!     Thank you for choosing [facility]  for your care. Our goal is always to provide you with excellent care. Hearing back from our patients is one way we can continue to improve our services. Please take a few minutes to complete the written survey that you may receive in the mail after your visit with us. Thank you!             Your Updated Medication List - Protect others around you: Learn how to safely use, store and throw away your medicines at www.disposemymeds.org.          This list is accurate as of: 9/6/17 11:26 AM.  Always use your most recent med list.                   Brand Name Dispense Instructions for use Diagnosis    aspirin 81 MG tablet     30 tablet    Take 1 tablet (81 mg) by mouth daily    Ischemic cardiomyopathy       atorvastatin 40 MG tablet    LIPITOR    90 tablet    TAKE ONE TABLET BY MOUTH EVERY DAY    Chronic systolic heart failure (H), Wheezing, CKD (chronic kidney disease) stage 3, GFR 30-59 ml/min, CHF (NYHA class II, ACC/AHA stage C) (H)       blood glucose monitoring meter device kit     1 kit    Use to test blood sugar 3-4 times daily, as directed.    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    CHF (NYHA class II, ACC/AHA stage C) (H)       glipiZIDE 10 MG 24 hr tablet    glipiZIDE XL    90 tablet    Take 1 tablet (10 mg) by mouth daily    Type 2 diabetes mellitus with diabetic nephropathy, with long-term current use of insulin (H)       insulin glargine 100 UNIT/ML injection     12 mL    Inject 40 Units Subcutaneous At Bedtime    Type 2 diabetes mellitus with diabetic nephropathy, with long-term current use of insulin (H)       insulin pen needle 31G X 5 MM     100 each    Use one needle daily, as directed    Type 2 diabetes, HbA1C goal < 8% (H)       isosorbide mononitrate 30 MG 24 hr tablet    IMDUR    135 tablet    Take 1.5 tablets (45 mg) by mouth daily    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    Type 2 diabetes mellitus with diabetic nephropathy, with long-term current use of insulin (H)       order for DME     1 Units    Auto CPAP 8-15 cmH20        spironolactone 25 MG tablet    ALDACTONE    135 tablet    Take 1.5 tablets (37.5 mg) by mouth daily    CHF (NYHA class II, ACC/AHA stage C) (H)       * Notice:  This list has 2 medication(s) that are the same as other medications prescribed for you. Read the directions carefully, and ask your doctor or other care provider to review them with you.

## 2017-09-06 NOTE — NURSING NOTE
"Chief Complaint   Patient presents with     Diabetes       Initial /75  Pulse 85  Temp 98.6  F (37  C) (Oral)  Resp 16  Wt 195 lb (88.5 kg)  BMI 31.47 kg/m2 Estimated body mass index is 31.47 kg/(m^2) as calculated from the following:    Height as of 7/5/17: 5' 6\" (1.676 m).    Weight as of this encounter: 195 lb (88.5 kg).  Medication Reconciliation: complete    "

## 2017-09-07 PROCEDURE — 82274 ASSAY TEST FOR BLOOD FECAL: CPT | Performed by: PHYSICIAN ASSISTANT

## 2017-09-07 NOTE — TELEPHONE ENCOUNTER
Received fax from pharmacy stating patient requires Prior Authorization for insulin glargine (BASAGLAR KWIKPEN) 100 UNIT/ML injection    Insurance information:  Name: [place] Medicaid  Phone number: [phone]  ID number: [id number]    Provider to address. Message route to Anna Archuleta. Initiate prior authorization or change medication?  Please advise.    Sruthi Hicks MA    **If a prior authorization is to be initiated, please list the following:    -Any medications the patient has tried and failed or any contraindications. **    -Is the patient currently on this medication, or has tried before? **    -What is the diagnosis? **    - Justification or other information that me by helpful. **

## 2017-09-08 DIAGNOSIS — Z12.11 SPECIAL SCREENING FOR MALIGNANT NEOPLASMS, COLON: ICD-10-CM

## 2017-09-08 DIAGNOSIS — E11.9 TYPE 2 DIABETES, HBA1C GOAL < 8% (H): ICD-10-CM

## 2017-09-08 RX ORDER — FLURBIPROFEN SODIUM 0.3 MG/ML
SOLUTION/ DROPS OPHTHALMIC
Qty: 100 EACH | Refills: 3 | Status: SHIPPED | OUTPATIENT
Start: 2017-09-08 | End: 2018-06-05

## 2017-09-08 NOTE — TELEPHONE ENCOUNTER
BD Pen needle mini      Last Written Prescription Date: 01/*06/17  Last Fill Quantity: 100,  # refills: 3   Last Office Visit with G, P or  Health prescribing provider: 09/06/17                                         Next 5 appointments (look out 90 days)     Sep 14, 2017  9:00 AM CDT   Return Visit with ELIAJH Cota CNP   [facility] AT [facility] ([facility])    [address] [place] [address] [address] [facility] [address]   [phone]            Sep 27, 2017  9:00 AM CDT   Office Visit with Anna Archuleta PA-C   [facility] [place] ([facility]    [address] [place] [address]  [place] [address] [phone]

## 2017-09-08 NOTE — TELEPHONE ENCOUNTER
Insurance will cover [place]    Thank you  Maribell Barros CPht    [facility]  Phone: [phone]  Fax: [phone]

## 2017-09-10 LAB — HEMOCCULT STL QL IA: NEGATIVE

## 2017-09-13 DIAGNOSIS — I50.22 CHRONIC SYSTOLIC HEART FAILURE (H): ICD-10-CM

## 2017-09-13 LAB
ANION GAP SERPL CALCULATED.3IONS-SCNC: 9 MMOL/L (ref 3–14)
BUN SERPL-MCNC: 29 MG/DL (ref 7–30)
CALCIUM SERPL-MCNC: 8.9 MG/DL (ref 8.5–10.1)
CHLORIDE SERPL-SCNC: 103 MMOL/L (ref 94–109)
CO2 SERPL-SCNC: 24 MMOL/L (ref 20–32)
CREAT SERPL-MCNC: 1.77 MG/DL (ref 0.66–1.25)
GFR SERPL CREATININE-BSD FRML MDRD: 39 ML/MIN/1.7M2
GLUCOSE SERPL-MCNC: 163 MG/DL (ref 70–99)
POTASSIUM SERPL-SCNC: 4.5 MMOL/L (ref 3.4–5.3)
SODIUM SERPL-SCNC: 136 MMOL/L (ref 133–144)

## 2017-09-13 PROCEDURE — 80048 BASIC METABOLIC PNL TOTAL CA: CPT | Performed by: NURSE PRACTITIONER

## 2017-09-13 PROCEDURE — 36415 COLL VENOUS BLD VENIPUNCTURE: CPT | Performed by: NURSE PRACTITIONER

## 2017-09-14 ENCOUNTER — OFFICE VISIT (OUTPATIENT)
Dept: CARDIOLOGY | Facility: CLINIC | Age: 62
End: 2017-09-14
Payer: MEDICARE

## 2017-09-14 VITALS
DIASTOLIC BLOOD PRESSURE: 71 MMHG | OXYGEN SATURATION: 97 % | SYSTOLIC BLOOD PRESSURE: 122 MMHG | HEART RATE: 75 BPM | BODY MASS INDEX: 31.8 KG/M2 | WEIGHT: 197 LBS

## 2017-09-14 DIAGNOSIS — I50.22 CHRONIC SYSTOLIC HEART FAILURE (H): Primary | ICD-10-CM

## 2017-09-14 PROCEDURE — 99214 OFFICE O/P EST MOD 30 MIN: CPT | Performed by: NURSE PRACTITIONER

## 2017-09-14 PROCEDURE — 99211 OFF/OP EST MAY X REQ PHY/QHP: CPT

## 2017-09-14 ASSESSMENT — PAIN SCALES - GENERAL: PAINLEVEL: NO PAIN (0)

## 2017-09-14 NOTE — LETTER
9/14/2017      RE: Lucian Oliveira  [address] [address]       Dear Colleague,    Thank you for the opportunity to participate in the care of your patient, Lucian Oliveira, at the [facility] HEART AT [facility] at [facility]. Please see a copy of my visit note below.    Chief Complaint:  No chief complaint on file.        HPI: Mister Lucian Oliveira is a 61-year-old man with a diagnosis of coronary artery disease status post coronary artery bypass graft surgery, ischemic cardio myopathy decreased left ventricular function with most recent ejection fraction 15-20% on echocardiogram June 2017. He also has a history of sleep apnea and uses CPAP on a nightly basis, uncontrolled diabetes mellitus last hemoglobin A1c of 9.8.  Review of systems.  He denies any hospitalizations or emergency room visits since June 2017.  He denies chest pain and denied use of nitroglycerin lightheaded dizziness palpitation syncopal episodes ICD firing or ankle.  He denies SOB at rest, he does get SOB while walking or climbing stairs. He denies PND.  Is still edema he checks his blood sugar 2-3 times per day weighs himself occasionally habits and he exercises approximately 90 minutes every other day. He takes an additional dose of furosemide approximately one time per week.    PAST MEDICAL HISTORY:  Past Medical History:   Diagnosis Date     NO ACTIVE PROBLEMS       PAST SURGICAL HISTORY  Past Surgical History:   Procedure Laterality Date     C CABG, ARTERY-VEIN, THREE  02/2008     IMPLANT AUTOMATIC IMPLANTABLE CARDIOVERTER DEFIBRILLATOR           FAMILY HISTORY:  Family History   Problem Relation Age of Onset     DIABETES Brother      DIABETES Sister      DIABETES Sister      Macular Degeneration No family hx of      Glaucoma No family hx of      Myocardial Infarction No family hx of      KIDNEY DISEASE No family hx of          SOCIAL HISTORY:  Social  History     Social History     Marital status:      Spouse name: N/A     Number of children: 4     Years of education: N/A     Occupational History      [employer]      Retired     Social History Main Topics     Smoking status: Never Smoker     Smokeless tobacco: Never Used      Comment: Never smoked; non-smoking household     Alcohol use No      Comment: rare use     Drug use: No     Sexual activity: Yes     Partners: Female     Other Topics Concern     Parent/Sibling W/ Cabg, Mi Or Angioplasty Before 65f 55m? No     Social History Narrative       ALLERGIES  Allergies   Allergen Reactions     No Known Drug Allergies          CURRENT MEDICATIONS:    Current Outpatient Prescriptions on File Prior to Visit:  insulin glargine (LANTUS SOLOSTAR) 100 UNIT/ML injection Inject 40 Units Subcutaneous At Bedtime   B-D U/F insulin pen needle USE 1 NEEDLE DAILY AS DIRECTED   furosemide (LASIX) 40 MG tablet Take 1 tablet (40 mg) by mouth daily - Take an extra 40mg as needed if weight goes up 2 pounds overnight, or more than 5 pounds in 1 week.   losartan (COZAAR) 50 MG tablet Take 1 tablet (50 mg) by mouth daily   glipiZIDE (GLIPIZIDE XL) 10 MG 24 hr tablet Take 1 tablet (10 mg) by mouth daily   carvedilol (COREG) 25 MG tablet Take 1 tablet (25 mg) by mouth 2 times daily (with meals)   spironolactone (ALDACTONE) 25 MG tablet Take 1.5 tablets (37.5 mg) by mouth daily   clopidogrel (PLAVIX) 75 MG tablet Take 1 tablet (75 mg) by mouth daily   isosorbide mononitrate (IMDUR) 30 MG 24 hr tablet Take 1.5 tablets (45 mg) by mouth daily   blood glucose monitoring (ONE TOUCH ULTRA) test strip Use to test blood sugar 3 times daily or as directed.   ONE TOUCH ULTRA test strip USE TO TEST BLOOD SUGAR TWO TIMES A DAY OR AS DIRECTED   atorvastatin (LIPITOR) 40 MG tablet TAKE ONE TABLET BY MOUTH EVERY DAY   blood glucose monitoring (ACCU-CHEK FELIPE SMARTVIEW) meter device kit Use to test blood sugar 3-4 times daily, as  directed.   order for DME Auto CPAP 8-15 cmH20   nitroglycerin (NITROSTAT) 0.4 MG SL tablet Place 1 tablet (0.4 mg) under the tongue every 5 minutes as needed for chest pain If you are still having symptoms after 3 doses (15 minutes) call 911. (Patient not taking: Reported on 9/6/2017)   aspirin 81 MG tablet Take 1 tablet (81 mg) by mouth daily     No current facility-administered medications on file prior to visit.     ROS:   Constitutional: No fever, chills, or sweats. No weight gain/loss.   ENT: No visual disturbance, ear ache, epistaxis, sore throat.   Allergies/Immunologic: Negative.   Respiratory: No cough, hemoptysis.   Cardiovascular: As per HPI.   GI: No nausea, vomiting, hematemesis, melena  : No urinary frequency, dysuria, or hematuria.   Integument: Negative.   Psychiatric: Negative.   Neuro: Negative.   Endocrinology: Negative.   Musculoskeletal: Negative.    EXAM:  There were no vitals taken for this visit.  Home weight  General: WDWN middle aged man in NAD, appears comfortable, alert and articulate  Head: normocephalic, atraumatic  Eyes: anicteric sclera, EOMI  Neck: no adenopathy, no carotid bruits noted bilaterally  Orophyarynx: moist mucosa, no lesions, dentition intact  Heart: regular, S1/S2, no murmur, gallop, rub, no JVD  Acacia indentation is  Noted  Lungs: bilaterally clear, without crackles, rhonchi or wheezes bilaterally  Abdomen: obese, soft, non-tender, bowel sounds present, no hepatomegaly  Extremities: no clubbing, cyanosis or edema  Neurological: normal speech and affect, no gross motor deficits  Skin:  No rashes or lesions noted      Labs:  CBC RESULTS:  Lab Results   Component Value Date    WBC 7.3 07/05/2017    RBC 4.30 (L) 07/05/2017    HGB 13.4 07/05/2017    HCT 39.8 (L) 07/05/2017    MCV 93 07/05/2017    MCH 31.2 07/05/2017    MCHC 33.7 07/05/2017    RDW 13.4 07/05/2017     07/05/2017       CMP RESULTS:  Lab Results   Component Value Date     09/13/2017    POTASSIUM 4.5  2017    CHLORIDE 103 2017    CO2 24 2017    ANIONGAP 9 2017     (H) 2017    BUN 29 2017    CR 1.77 (H) 2017    GFRESTIMATED 39 (L) 2017    GFRESTBLACK 47 (L) 2017    BRYANNA 8.9 2017    BILITOTAL 0.6 2017    ALBUMIN 3.3 (L) 2017    ALKPHOS 113 2017    ALT 28 2017    AST 17 2017      Lab Results   Component Value Date    A1C 9.8 (H) 2017     Lab Results   Component Value Date    CHOL 93 2017     Lab Results   Component Value Date    HDL 26 2017     Lab Results   Component Value Date    LDL 22 2017     Lab Results   Component Value Date    TRIG 226 2017     Lab Results   Component Value Date    CHOLHDLRATIO 2.6 2015       INR RESULTS:  Lab Results   Component Value Date    INR 1.01 2015       No components found for: CK  Lab Results   Component Value Date    MAG 2.0 2015     Lab Results   Component Value Date    NTBNPI 8936 (H) 2015       Most recent echocardiogram:  Recent Results (from the past 8760 hour(s))   ECHO COMPLETE WITH OPTISON    Narrative    [id number]  ECH73  [id number]  [id number]^THENAPPAN^THENAPPAN^           [facility],[place]  Echocardiography Laboratory  [address] [place] [id number]     Name: BUCK ALFARO  MRN: [id number]  : 1955  Study Date: 2017 11:36 AM  Age: 61 yrs  Gender: Male  [facility]  Reason For Study: systolic (congestive) heart failure, Heart failure,  Ordering Physician: SHONDA MERCHANT  Referring Physician: SHONDA MERCHANT  Performed By: Subha Pettit RDCS, KYT     BSA: 2.0 m2  Height: 66 in  Weight: 197 lb  BP: 110/56 mmHg  _____________________________________________________________________________  __        Procedure  Echocardiogram with two-dimensional, color and spectral Doppler performed.  Contrast Optison. Optison (NDC #0917-2939-85) given intravenously.  Patient was  given 6 ml mixture of 3 ml Optison and 6 ml saline. 3 ml wasted.  _____________________________________________________________________________  __        Interpretation Summary  Moderate left ventricular dilation is present.  Severe diffuse hypokinesis is present.  The Ejection Fraction is estimated at 15-20%.  The inferior vena cava is normal.  No pericardial effusion is present.  _____________________________________________________________________________  __        Left Ventricle  Moderate left ventricular dilation is present. The Ejection Fraction is  estimated at 15-20%. Grade II or moderate diastolic dysfunction. Severe  diffuse hypokinesis is present. There is no thrombus.     Right Ventricle  Right ventricular function cannot be assessed due to poor image quality. Mild  right ventricular dilation is present. Global right ventricular function is  mildly reduced. A pacemaker lead is noted in the right ventricle.     Atria  Both atria appear normal.     Mitral Valve  Trace to mild mitral insufficiency is present.        Aortic Valve  Aortic valve is normal in structure and function.     Tricuspid Valve  The tricuspid valve is normal. Trace tricuspid insufficiency is present. The  right ventricular systolic pressure is approximated at 21.0 mmHg plus the  right atrial pressure.     Pulmonic Valve  Trace pulmonic insufficiency is present.     Vessels  The inferior vena cava is normal. Ascending aorta 3.8 cm.     Pericardium  No pericardial effusion is present.        Compared to Previous Study  This study was compared with the study from 7/6/2015 . There has been no  change.  _____________________________________________________________________________  __  MMode/2D Measurements & Calculations     IVSd: 1.1 cm  LVIDd: 5.7 cm  LVIDs: 5.0 cm  LVPWd: 1.1 cm  FS: 11.3 %  EDV(Teich): 158.1 ml  ESV(Teich): 119.9 ml  LV mass(C)d: 249.9 grams  LV mass(C)dI: 125.7 grams/m2  Ao root diam: 3.5 cm  asc Aorta  Diam: 3.8 cm  LVOT diam: 2.3 cm  LVOT area: 4.2 cm2  LA Volume (BP): 56.0 ml  LA Volume Index (BP): 28.1 ml/m2           Doppler Measurements & Calculations  MV E max kyle: 64.5 cm/sec  MV A max kyle: 76.2 cm/sec  MV E/A: 0.85  MV dec time: 0.14 sec  TR max kyle: 229.0 cm/sec  TR max P.0 mmHg  Lateral E/e': 11.1  Medial E/e': 20.7           _____________________________________________________________________________  __           Report approved by: Ricardo Clayton 2017 02:35 PM          Assessment   1.  Chronic ischemic cardiomyopathy secondary to coronary artery disease he stage C New York Heart Association class II.   Continues to be on losartan 50 Mauch comes.   carvedilol 25 mg p.o. b.i.d.   37.5 Mg of Spironolactone every day   Single Lead ICD Implanted Last Result Was Less Than 1% Ventricularly Paced.  Fluid Status Is Euvolemic   Sleep Apnea Continues to Use CPAP on a Nightly Basis.   Diabetes Mellitus Uncontrolled with Latest Hemoglobin A1c Elevated to 9.8.   Exercise every other day  2.  Sleep apnea-uses CPAP nightly  3. Uncontrolled DM, last HA1C 9.8, up from 9.1 in .    Plan.  1 . No change in medications or therapies,  2.  Return to clinic with   as scheduled with a repeat device check.   3.Encouraged him to purchase a pillbox.   4.  Also encouraged him to increase activity and continue to monitor his blood sugar blood glucose closely.   5.  Echocar One was the last yeardiogram results and exercise stress test and lab results reviewed today lab a BUN/creatinine is stable from blood draw on 2017.  6.  RTC with C[facility]-HF clinic in 6 months.    Diane MARX, APRN, CNP  Clinical Nurse Specialist  [facility]/Advanced Heart Failure/Mechanical Circulatory Support  Pager

## 2017-09-14 NOTE — PROGRESS NOTES
Chief Complaint:  No chief complaint on file.        HPI: Mister Lucian Oliveira is a 61-year-old man with a diagnosis of coronary artery disease status post coronary artery bypass graft surgery, ischemic cardio myopathy decreased left ventricular function with most recent ejection fraction 15-20% on echocardiogram June 2017. He also has a history of sleep apnea and uses CPAP on a nightly basis, uncontrolled diabetes mellitus last hemoglobin A1c of 9.8.  Review of systems.  He denies any hospitalizations or emergency room visits since June 2017.  He denies chest pain and denied use of nitroglycerin lightheaded dizziness palpitation syncopal episodes ICD firing or ankle.  He denies SOB at rest, he does get SOB while walking or climbing stairs. He denies PND.  Is still edema he checks his blood sugar 2-3 times per day weighs himself occasionally habits and he exercises approximately 90 minutes every other day. He takes an additional dose of furosemide approximately one time per week.    PAST MEDICAL HISTORY:  Past Medical History:   Diagnosis Date     NO ACTIVE PROBLEMS       PAST SURGICAL HISTORY  Past Surgical History:   Procedure Laterality Date     C CABG, ARTERY-VEIN, THREE  02/2008     IMPLANT AUTOMATIC IMPLANTABLE CARDIOVERTER DEFIBRILLATOR           FAMILY HISTORY:  Family History   Problem Relation Age of Onset     DIABETES Brother      DIABETES Sister      DIABETES Sister      Macular Degeneration No family hx of      Glaucoma No family hx of      Myocardial Infarction No family hx of      KIDNEY DISEASE No family hx of          SOCIAL HISTORY:  Social History     Social History     Marital status:      Spouse name: N/A     Number of children: 4     Years of education: N/A     Occupational History      [employer]      Retired     Social History Main Topics     Smoking status: Never Smoker     Smokeless tobacco: Never Used      Comment: Never smoked; non-smoking household     Alcohol  use No      Comment: rare use     Drug use: No     Sexual activity: Yes     Partners: Female     Other Topics Concern     Parent/Sibling W/ Cabg, Mi Or Angioplasty Before 65f 55m? No     Social History Narrative       ALLERGIES  Allergies   Allergen Reactions     No Known Drug Allergies          CURRENT MEDICATIONS:    Current Outpatient Prescriptions on File Prior to Visit:  insulin glargine (LANTUS SOLOSTAR) 100 UNIT/ML injection Inject 40 Units Subcutaneous At Bedtime   B-D U/F insulin pen needle USE 1 NEEDLE DAILY AS DIRECTED   furosemide (LASIX) 40 MG tablet Take 1 tablet (40 mg) by mouth daily - Take an extra 40mg as needed if weight goes up 2 pounds overnight, or more than 5 pounds in 1 week.   losartan (COZAAR) 50 MG tablet Take 1 tablet (50 mg) by mouth daily   glipiZIDE (GLIPIZIDE XL) 10 MG 24 hr tablet Take 1 tablet (10 mg) by mouth daily   carvedilol (COREG) 25 MG tablet Take 1 tablet (25 mg) by mouth 2 times daily (with meals)   spironolactone (ALDACTONE) 25 MG tablet Take 1.5 tablets (37.5 mg) by mouth daily   clopidogrel (PLAVIX) 75 MG tablet Take 1 tablet (75 mg) by mouth daily   isosorbide mononitrate (IMDUR) 30 MG 24 hr tablet Take 1.5 tablets (45 mg) by mouth daily   blood glucose monitoring (ONE TOUCH ULTRA) test strip Use to test blood sugar 3 times daily or as directed.   ONE TOUCH ULTRA test strip USE TO TEST BLOOD SUGAR TWO TIMES A DAY OR AS DIRECTED   atorvastatin (LIPITOR) 40 MG tablet TAKE ONE TABLET BY MOUTH EVERY DAY   blood glucose monitoring (ACCU-CHEK FELIPE SMARTVIEW) meter device kit Use to test blood sugar 3-4 times daily, as directed.   order for DME Auto CPAP 8-15 cmH20   nitroglycerin (NITROSTAT) 0.4 MG SL tablet Place 1 tablet (0.4 mg) under the tongue every 5 minutes as needed for chest pain If you are still having symptoms after 3 doses (15 minutes) call 911. (Patient not taking: Reported on 9/6/2017)   aspirin 81 MG tablet Take 1 tablet (81 mg) by mouth daily     No current  facility-administered medications on file prior to visit.     ROS:   Constitutional: No fever, chills, or sweats. No weight gain/loss.   ENT: No visual disturbance, ear ache, epistaxis, sore throat.   Allergies/Immunologic: Negative.   Respiratory: No cough, hemoptysis.   Cardiovascular: As per HPI.   GI: No nausea, vomiting, hematemesis, melena  : No urinary frequency, dysuria, or hematuria.   Integument: Negative.   Psychiatric: Negative.   Neuro: Negative.   Endocrinology: Negative.   Musculoskeletal: Negative.    EXAM:  There were no vitals taken for this visit.  Home weight  General: WDWN middle aged man in NAD, appears comfortable, alert and articulate  Head: normocephalic, atraumatic  Eyes: anicteric sclera, EOMI  Neck: no adenopathy, no carotid bruits noted bilaterally  Orophyarynx: moist mucosa, no lesions, dentition intact  Heart: regular, S1/S2, no murmur, gallop, rub, no JVD  Acacia indentation is  Noted  Lungs: bilaterally clear, without crackles, rhonchi or wheezes bilaterally  Abdomen: obese, soft, non-tender, bowel sounds present, no hepatomegaly  Extremities: no clubbing, cyanosis or edema  Neurological: normal speech and affect, no gross motor deficits  Skin:  No rashes or lesions noted      Labs:  CBC RESULTS:  Lab Results   Component Value Date    WBC 7.3 07/05/2017    RBC 4.30 (L) 07/05/2017    HGB 13.4 07/05/2017    HCT 39.8 (L) 07/05/2017    MCV 93 07/05/2017    MCH 31.2 07/05/2017    MCHC 33.7 07/05/2017    RDW 13.4 07/05/2017     07/05/2017       CMP RESULTS:  Lab Results   Component Value Date     09/13/2017    POTASSIUM 4.5 09/13/2017    CHLORIDE 103 09/13/2017    CO2 24 09/13/2017    ANIONGAP 9 09/13/2017     (H) 09/13/2017    BUN 29 09/13/2017    CR 1.77 (H) 09/13/2017    GFRESTIMATED 39 (L) 09/13/2017    GFRESTBLACK 47 (L) 09/13/2017    BRYANNA 8.9 09/13/2017    BILITOTAL 0.6 07/05/2017    ALBUMIN 3.3 (L) 07/05/2017    ALKPHOS 113 07/05/2017    ALT 28 07/05/2017    AST 17  2017      Lab Results   Component Value Date    A1C 9.8 (H) 2017     Lab Results   Component Value Date    CHOL 93 2017     Lab Results   Component Value Date    HDL 26 2017     Lab Results   Component Value Date    LDL 22 2017     Lab Results   Component Value Date    TRIG 226 2017     Lab Results   Component Value Date    CHOLHDLRATIO 2.6 2015       INR RESULTS:  Lab Results   Component Value Date    INR 1.01 2015       No components found for: CK  Lab Results   Component Value Date    MAG 2.0 2015     Lab Results   Component Value Date    NTBNPI 8936 (H) 2015       Most recent echocardiogram:  Recent Results (from the past 8760 hour(s))   ECHO COMPLETE WITH OPTISON    Narrative    [id number]  ECH73  [id number]  [id number]^THENAPPAN^THENAPPAN^           [facility],[place]  Echocardiography Laboratory  [address] [place] [id number]     Name: BUCK ALFARO  MRN: [id number]  : 1955  Study Date: 2017 11:36 AM  Age: 61 yrs  Gender: Male  Patient Location: [facility]  Reason For Study: systolic (congestive) heart failure, Heart failure,  Ordering Physician: SHONDA MERCHANT  Referring Physician: SHONDA MERCHANT  Performed By: Subha Pettit RDCS, RVT     BSA: 2.0 m2  Height: 66 in  Weight: 197 lb  BP: 110/56 mmHg  _____________________________________________________________________________  __        Procedure  Echocardiogram with two-dimensional, color and spectral Doppler performed.  Contrast Optison. Optison (NDC #9712-0922-23) given intravenously. Patient was  given 6 ml mixture of 3 ml Optison and 6 ml saline. 3 ml wasted.  _____________________________________________________________________________  __        Interpretation Summary  Moderate left ventricular dilation is present.  Severe diffuse hypokinesis is present.  The Ejection Fraction is estimated at 15-20%.  The inferior vena cava is  normal.  No pericardial effusion is present.  _____________________________________________________________________________  __        Left Ventricle  Moderate left ventricular dilation is present. The Ejection Fraction is  estimated at 15-20%. Grade II or moderate diastolic dysfunction. Severe  diffuse hypokinesis is present. There is no thrombus.     Right Ventricle  Right ventricular function cannot be assessed due to poor image quality. Mild  right ventricular dilation is present. Global right ventricular function is  mildly reduced. A pacemaker lead is noted in the right ventricle.     Atria  Both atria appear normal.     Mitral Valve  Trace to mild mitral insufficiency is present.        Aortic Valve  Aortic valve is normal in structure and function.     Tricuspid Valve  The tricuspid valve is normal. Trace tricuspid insufficiency is present. The  right ventricular systolic pressure is approximated at 21.0 mmHg plus the  right atrial pressure.     Pulmonic Valve  Trace pulmonic insufficiency is present.     Vessels  The inferior vena cava is normal. Ascending aorta 3.8 cm.     Pericardium  No pericardial effusion is present.        Compared to Previous Study  This study was compared with the study from 2015 . There has been no  change.  _____________________________________________________________________________  __  MMode/2D Measurements & Calculations     IVSd: 1.1 cm  LVIDd: 5.7 cm  LVIDs: 5.0 cm  LVPWd: 1.1 cm  FS: 11.3 %  EDV(Teich): 158.1 ml  ESV(Teich): 119.9 ml  LV mass(C)d: 249.9 grams  LV mass(C)dI: 125.7 grams/m2  Ao root diam: 3.5 cm  asc Aorta Diam: 3.8 cm  LVOT diam: 2.3 cm  LVOT area: 4.2 cm2  LA Volume (BP): 56.0 ml  LA Volume Index (BP): 28.1 ml/m2           Doppler Measurements & Calculations  MV E max kyle: 64.5 cm/sec  MV A max kyle: 76.2 cm/sec  MV E/A: 0.85  MV dec time: 0.14 sec  TR max kyle: 229.0 cm/sec  TR max P.0 mmHg  Lateral E/e': 11.1  Medial E/e': 20.7            _____________________________________________________________________________  __           Report approved by: Ricardo Clayton 06/05/2017 02:35 PM          Assessment   1.  Chronic ischemic cardiomyopathy secondary to coronary artery disease he stage C New York Heart Association class II.   Continues to be on losartan 50 Mauch comes.   carvedilol 25 mg p.o. b.i.d.   37.5 Mg of Spironolactone every day   Single Lead ICD Implanted Last Result Was Less Than 1% Ventricularly Paced.  Fluid Status Is Euvolemic   Sleep Apnea Continues to Use CPAP on a Nightly Basis.   Diabetes Mellitus Uncontrolled with Latest Hemoglobin A1c Elevated to 9.8.   Exercise every other day  2.  Sleep apnea-uses CPAP nightly  3. Uncontrolled DM, last HA1C 9.8, up from 9.1 in July '17.    Plan.  1 . No change in medications or therapies,  2.  Return to clinic with  December 11 as scheduled with a repeat device check.   3.Encouraged him to purchase a pillbox.   4.  Also encouraged him to increase activity and continue to monitor his blood sugar blood glucose closely.   5.  Echocar One was the last yeardiogram results and exercise stress test and lab results reviewed today lab a BUN/creatinine is stable from blood draw on July 5, 2017.  6.  RTC with [facility] clinic in 6 months.    Diane MARX, APRN, CNP  Clinical Nurse Specialist  [facility]/Advanced Heart Failure/Mechanical Circulatory Support  Pager

## 2017-09-14 NOTE — NURSING NOTE
"Chief Complaint   Patient presents with     RECHECK     Return [facility] appt; 61yr old male wth a h/o chronic systolic heart failure secondary to ICM presenting for follow-up. Labs prior. Feeling  well no ne sx.       Initial /71 (BP Location: Left arm, Patient Position: Chair, Cuff Size: Adult Regular)  Pulse 75  Wt 197 lb (89.4 kg)  SpO2 97%  BMI 31.8 kg/m2 Estimated body mass index is 31.8 kg/(m^2) as calculated from the following:    Height as of 7/5/17: 5' 6\" (1.676 m).    Weight as of this encounter: 197 lb (89.4 kg)..  BP completed using cuff size: regular    Fatou Perla CMA    "

## 2017-09-14 NOTE — PATIENT INSTRUCTIONS
"  You were seen today in the Cardiovascular Clinic at the [facility].     Cardiology Providers you saw during your visit: ELIJAH Ramirez       1.Please make a follow-up CORE/heart failure appt with Diane Watts in 6 months with labs prior.   2. Consider getting a pill box.  3. Device checks as scheduled prior to [place] appt on 17      Please limit your fluid intake to 2 L (64 ounces) daily.  2 Liters a day = 8.5 cups, or 72 ounces.  Please limit your salt intake to 2 grams a day or less.    If you gain 2# in 24 hours or 5# in one week call Petra Hall RN so we can adjust your medications as needed over the phone.    Please feel free to call me with any questions or concerns.      Petra Hall RN BSN   [facility]  Cardiology Care Coordinator-Heart Failure Clinic    Questions and [phone].   First press #1 for the [employer] and then press #3 for \"Medical Questions\" to reach us Cardiology Nurses.     On Call Cardiologist for after hours or on weekends: [phone]   option #4 and ask to speak to the on-call Cardiologist. Inform them you are a CORE/heart failure patient at the [facility].        If you need a medication refill please contact your pharmacy.  Please allow 3 business days for your refill to be completed.  _______________________________________________________  C.O.R.E. CLINIC Cardiomyopathy, Optimization, Rehabilitation, Education   The C.O.R.E. CLINIC is a heart failure specialty clinic within the [facility] where you will work with specialized nurse practitioners dedicated to helping patients with heart failure carefully adjust medications, receive education, and learn who and when to call if symptoms develop. They specialize in helping you better understand your condition, slow the progression of your disease, improve the length and quality of your life, help you detect future heart problems before they " become life threatening, and avoid hospitalizations.  As always, thank you for trusting us with your health care needs!

## 2017-09-14 NOTE — NURSING NOTE
Diet: Patient instructed regarding a heart failure healthy diet, including discussion of reduced fat and 2000 mg daily sodium restriction, daily weights, medication purpose and compliance, fluid restrictions and resources for patient and family to access for assistance with heart failure management.       Labs: Patient was given results of the laboratory testing obtained today and patient was instructed about when to return for the next laboratory testing.    Med Reconcile: Reviewed and verified all current medications with the patient. The updated medication list was printed and given to the patient. Consider pill box to help with med management.     Return Appointment: Patient given instructions regarding scheduling next clinic visit. 6month f/u with Diane with labs prior, we will call you to schedule it. Continue with appt with DR. Damon on 12/11 with device check prior.     Patient stated he understood all health information given and agreed to call with further questions or concerns.

## 2017-09-14 NOTE — MR AVS SNAPSHOT
"              After Visit Summary   2017    Lucian Oliveira    MRN: [id number]           Patient Information     Date Of Birth          1955        Visit Information        Provider Department      2017 9:00 AM Diane Watts APRN CNP; [employer] [facility] PHYSICIANS HEART AT [facility]        Care Instructions      You were seen today in the Cardiovascular Clinic at the [facility].     Cardiology Providers you saw during your visit: ELIJAH Ramirez       1.Please make a follow-up CORE/heart failure appt with Diane Watts in 6 months with labs prior.   2. Consider getting a pill box.  3. Device checks as scheduled prior to [place] appt on 17      Please limit your fluid intake to 2 L (64 ounces) daily.  2 Liters a day = 8.5 cups, or 72 ounces.  Please limit your salt intake to 2 grams a day or less.    If you gain 2# in 24 hours or 5# in one week call Petra Hall RN so we can adjust your medications as needed over the phone.    Please feel free to call me with any questions or concerns.      Petra Hall RN BSN   [facility]  Cardiology Care Coordinator-Heart Failure Clinic    Questions and [phone].   First press #1 for the [employer] and then press #3 for \"Medical Questions\" to reach us Cardiology Nurses.     On Call Cardiologist for after hours or on weekends: [phone]   option #4 and ask to speak to the on-call Cardiologist. Inform them you are a CORE/heart failure patient at the [facility].        If you need a medication refill please contact your pharmacy.  Please allow 3 business days for your refill to be completed.  _______________________________________________________  C.O.R.E. CLINIC Cardiomyopathy, Optimization, Rehabilitation, Education   The C.O.R.E. CLINIC is a heart failure specialty clinic within the [facility] where you will work with specialized " nurse practitioners dedicated to helping patients with heart failure carefully adjust medications, receive education, and learn who and when to call if symptoms develop. They specialize in helping you better understand your condition, slow the progression of your disease, improve the length and quality of your life, help you detect future heart problems before they become life threatening, and avoid hospitalizations.  As always, thank you for trusting us with your health care needs!                Follow-ups after your visit        Your next 10 appointments already scheduled     Sep 19, 2017  1:00 PM CDT   (Arrive by 12:45 PM)   NEW DIABETES with Yue Dowd MD   [facility] Endocrinology [facility])    [address] [place] [id number] [phone]            Sep 27, 2017  9:00 AM CDT   Office Visit with Anna Archuleta PA-C   [facility]    [address] [facility] [id number] [phone]           Bring a current list of meds and any records pertaining to this visit. For Physicals, please bring immunization records and any forms needing to be filled out. Please arrive 10 minutes early to complete paperwork.            Dec 11, 2017  2:30 PM CST   Lab with [facility] LAB   [facility] ([facility])    [address] [place] [id number] [phone]            Dec 11, 2017  3:00 PM CST   (Arrive by 2:45 PM)   Implanted Defibulator with  Cv Device 1   [facility] ([facility])    [address] [place] [id number] [phone]            Dec 11, 2017  3:30 PM CST   (Arrive by 3:15 PM)   RETURN HEART FAILURE with Arvin Sheridan MD   [facility])    [address] [place] [id number] [phone]              Who to contact     If you  have questions or need follow up information about today's clinic visit or your schedule please contact [facility] PHYSICIANS HEART AT [facility] directly at [phone].  Normal or non-critical lab and imaging results will be communicated to you by MyChart, letter or phone within 4 business days after the clinic has received the results. If you do not hear from us within 7 days, please contact the clinic through [employer] or phone. If you have a critical or abnormal lab result, we will notify you by phone as soon as possible.  Submit refill requests through [facility] or call your pharmacy and they will forward the refill request to us. Please allow 3 business days for your refill to be completed.          Additional Information About Your Visit        [employer] Information     [facility] gives you secure access to your electronic health record. If you see a primary care provider, you can also send messages to your care team and make appointments. If you have questions, please call your primary care clinic.  If you do not have a primary care provider, please call [phone] and they will assist you.        Care EveryWhere ID     This is your Care EveryWhere ID. This could be used by other organizations to access your [place] medical records  [id number]        Your Vitals Were     Pulse Pulse Oximetry BMI (Body Mass Index)             75 97% 31.8 kg/m2          Blood Pressure from Last 3 Encounters:   09/14/17 122/71   09/06/17 107/75   07/05/17 113/71    Weight from Last 3 Encounters:   09/14/17 89.4 kg (197 lb)   09/06/17 88.5 kg (195 lb)   07/05/17 89.4 kg (197 lb 3.2 oz)              Today, you had the following     No orders found for display       Primary Care Provider Office Phone # Fax #    Anna Archuleta PA-C [phone]       [address] [place] [address]        Equal Access to Services     MARIBETH RUSH : zach Villanueva, kasey  joana hollowayyoung graves ahMerly Fernandez [facility] [phone].    ATENCIÓN: Si yasmin azar, tiene a lainez disposición servicios gratuitos de asistencia lingüística. Damon al [phone].    We comply with applicable federal civil rights laws and [place] laws. We do not discriminate on the basis of race, color, national origin, age, disability sex, sexual orientation or gender identity.            Thank you!     Thank you for choosing [facility] PHYSICIANS HEART AT [facility]  for your care. Our goal is always to provide you with excellent care. Hearing back from our patients is one way we can continue to improve our services. Please take a few minutes to complete the written survey that you may receive in the mail after your visit with us. Thank you!             Your Updated Medication List - Protect others around you: Learn how to safely use, store and throw away your medicines at www.disposemymeds.org.          This list is accurate as of: 9/14/17  9:51 AM.  Always use your most recent med list.                   Brand Name Dispense Instructions for use Diagnosis    aspirin 81 MG tablet     30 tablet    Take 1 tablet (81 mg) by mouth daily    Ischemic cardiomyopathy       atorvastatin 40 MG tablet    LIPITOR    90 tablet    TAKE ONE TABLET BY MOUTH EVERY DAY    Chronic systolic heart failure (H), Wheezing, CKD (chronic kidney disease) stage 3, GFR 30-59 ml/min, CHF (NYHA class II, ACC/AHA stage C) (H)       B-D U/F 31G X 5 MM   Generic drug:  insulin pen needle     100 each    USE 1 NEEDLE DAILY AS DIRECTED    Type 2 diabetes, HbA1C goal < 8% (H)       blood glucose monitoring meter device kit     1 kit    Use to test blood sugar 3-4 times daily, as directed.    Type 2 diabetes mellitus with diabetic nephropathy, with long-term current use of insulin (H)       carvedilol 25 MG tablet    COREG    180 tablet    Take 1 tablet (25 mg) by mouth 2 times daily (with meals)     Hypertension goal BP (blood pressure) < 140/90       clopidogrel 75 MG tablet    PLAVIX    90 tablet    Take 1 tablet (75 mg) by mouth daily    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 (H)       furosemide 40 MG tablet    LASIX    180 tablet    Take 1 tablet (40 mg) by mouth daily - Take an extra 40mg as needed if weight goes up 2 pounds overnight, or more than 5 pounds in 1 week.    CHF (NYHA class II, ACC/AHA stage C) (H)       glipiZIDE 10 MG 24 hr tablet    glipiZIDE XL    90 tablet    Take 1 tablet (10 mg) by mouth daily    Type 2 diabetes mellitus with diabetic nephropathy, with long-term current use of insulin (H)       insulin glargine 100 UNIT/ML injection    LANTUS SOLOSTAR    15 mL    Inject 40 Units Subcutaneous At Bedtime    Type 2 diabetes mellitus with diabetic nephropathy, with long-term current use of insulin (H)       isosorbide mononitrate 30 MG 24 hr tablet    IMDUR    135 tablet    Take 1.5 tablets (45 mg) by mouth daily    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 (H)       * ONE TOUCH ULTRA test strip   Generic drug:  blood glucose monitoring     100 each    USE TO TEST BLOOD SUGAR TWO TIMES A DAY OR AS DIRECTED    Type 2 diabetes mellitus with diabetic nephropathy, with long-term current use of insulin (H)        * blood glucose monitoring test strip    ONE TOUCH ULTRA    100 each    Use to test blood sugar 3 times daily or as directed.    Type 2 diabetes mellitus with diabetic nephropathy, with long-term current use of insulin (H)       order for DME     1 Units    Auto CPAP 8-15 cmH20        spironolactone 25 MG tablet    ALDACTONE    135 tablet    Take 1.5 tablets (37.5 mg) by mouth daily    CHF (NYHA class II, ACC/AHA stage C) (H)       * Notice:  This list has 2 medication(s) that are the same as other medications prescribed for you. Read the directions carefully, and ask your doctor or other care provider to review them with you.

## 2017-09-19 ENCOUNTER — OFFICE VISIT (OUTPATIENT)
Dept: ENDOCRINOLOGY | Facility: CLINIC | Age: 62
End: 2017-09-19
Attending: INTERNAL MEDICINE

## 2017-09-19 VITALS
HEART RATE: 70 BPM | DIASTOLIC BLOOD PRESSURE: 67 MMHG | BODY MASS INDEX: 31.79 KG/M2 | WEIGHT: 197.8 LBS | HEIGHT: 66 IN | SYSTOLIC BLOOD PRESSURE: 107 MMHG

## 2017-09-19 DIAGNOSIS — E11.22 TYPE 2 DIABETES MELLITUS WITH STAGE 3 CHRONIC KIDNEY DISEASE, WITH LONG-TERM CURRENT USE OF INSULIN (H): ICD-10-CM

## 2017-09-19 DIAGNOSIS — Z79.4 TYPE 2 DIABETES MELLITUS WITH HYPERGLYCEMIA, WITH LONG-TERM CURRENT USE OF INSULIN (H): Primary | ICD-10-CM

## 2017-09-19 DIAGNOSIS — E11.65 TYPE 2 DIABETES MELLITUS WITH HYPERGLYCEMIA, WITH LONG-TERM CURRENT USE OF INSULIN (H): Primary | ICD-10-CM

## 2017-09-19 DIAGNOSIS — N18.30 TYPE 2 DIABETES MELLITUS WITH STAGE 3 CHRONIC KIDNEY DISEASE, WITH LONG-TERM CURRENT USE OF INSULIN (H): ICD-10-CM

## 2017-09-19 DIAGNOSIS — Z79.4 TYPE 2 DIABETES MELLITUS WITH STAGE 3 CHRONIC KIDNEY DISEASE, WITH LONG-TERM CURRENT USE OF INSULIN (H): ICD-10-CM

## 2017-09-19 ASSESSMENT — PAIN SCALES - GENERAL: PAINLEVEL: NO PAIN (0)

## 2017-09-19 NOTE — PROGRESS NOTES
- Endocrinology Initial Consultation -    Reason for visit/consult:  DM2, CKD, Heart Failure with EF 15-20%    Primary care provider: Anna Archuleta    HPI: A 60 yo male here for the initial evaluation for his DM, came with wife and inerpreter. He is an immigrant from [place] in .   Diagnosed DM2 at age 45, diagnosed by PMD by regular check up.Prescribed oral meds but not much taking and only with diet exercise. Insulin use was started around 1 year ago, lantus was started with 15 utnis and now 30 units. Insulin at night. Used to take Metformin and was discontinued due to GFR.     He has heart failure, and CABG  with defibrillator. He usually has no issue to walk, doing exercise in the gym every other day, walks more than 30 mins.     Glucometer forgot today. Isummarized based on the patient recalls.  This mornin this morning, 147 yesterday  Noon: 230, 200, 220  Night: 190, 180      Current regimens:  Glipizide 10 mg daily for many years (after breakfast)  Lantus 30 utnis at night    Life style:  Wake up 6am  Breakfast 8 am: coffee, beans egg, oatmeal, tortia: 45%  Lunch 1pm: squash, meat, vegi soup, soup, rice: 45%  Dinner 6pm: coffee, lina crackers, fruits: 10%  Bed time:       DM complications:  Retinopathy:   Nephropathy:   Neuropathy:   Most recent LDL:   LDL Cholesterol Calculated   Date Value Ref Range Status   2017 22 <100 mg/dL Final     Comment:     Desirable:       <100 mg/dl       Past Medical/Surgical History:  Past Medical History:   Diagnosis Date     NO ACTIVE PROBLEMS      Past Surgical History:   Procedure Laterality Date     C CABG, ARTERY-VEIN, THREE  2008     IMPLANT AUTOMATIC IMPLANTABLE CARDIOVERTER DEFIBRILLATOR         Allergies:  Allergies   Allergen Reactions     No Known Drug Allergies        Current Medications   Current Outpatient Prescriptions   Medication     insulin glargine (LANTUS SOLOSTAR) 100 UNIT/ML injection     B-D U/F insulin pen  "needle     furosemide (LASIX) 40 MG tablet     losartan (COZAAR) 50 MG tablet     glipiZIDE (GLIPIZIDE XL) 10 MG 24 hr tablet     carvedilol (COREG) 25 MG tablet     spironolactone (ALDACTONE) 25 MG tablet     clopidogrel (PLAVIX) 75 MG tablet     isosorbide mononitrate (IMDUR) 30 MG 24 hr tablet     blood glucose monitoring (ONE TOUCH ULTRA) test strip     ONE TOUCH ULTRA test strip     atorvastatin (LIPITOR) 40 MG tablet     blood glucose monitoring (ACCU-CHEK FELIPE SMARTVIEW) meter device kit     order for DME     nitroglycerin (NITROSTAT) 0.4 MG SL tablet     aspirin 81 MG tablet     No current facility-administered medications for this visit.        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 Otherwise a complete 14 point review of systems was taken and is negative unless stated in the history above.      Physical Exam:   Blood pressure 107/67, pulse 70, height 1.676 m (5' 6\"), weight 89.7 kg (197 lb 12.8 oz).    General: well appearing, no acute distress, pleasant and conversant,   Mental Status/neuro: alert and oriented  Face: symmetrical, normal facial color  Eyes: anicteric, PERRL, no proptosis or lid lag  Neck: suppler, no lymphadenopahty  Thyroid: normal size and texture, no nodule palpable, no bruits  Heart: regular rhythm, S1S2, no murmur appreciated  Lung: clear to auscultation " bilaterally  Abdomen: soft, NT/ND, no hepatomegaly  Legs: no swelling or edema  Feet: no deformities or ulcers, 2+ DP pulses, normal monofilament sensation      Labs: I reviewed the lab from epic and extract/summarize pertinent lab here.      Ref. Range 1/5/2017 07:41 2/7/2017 07:19 4/27/2017 07:46 7/5/2017 07:32 9/6/2017 08:45   Hemoglobin A1C Latest Ref Range: 4.3 - 6.0 % 10.7 (H) 9.8 (H) 10.5 (H) 9.1 (H) 9.8 (H)     Results for BUCK ALFARO (MRN [id number]) as of 9/19/2017 13:20   Ref. Range 11/7/2016 12:00 1/3/2017 08:57 1/12/2017 07:53 2/20/2017 07:38 4/25/2017 07:41 7/5/2017 07:32 9/13/2017 07:01   Creatinine Latest Ref Range: 0.66 - 1.25 mg/dL 1.89 (H) 2.16 (H) 1.86 (H) 1.81 (H) 1.77 (H) 1.77 (H) 1.77 (H)   GFR Estimate Latest Ref Range: >60 mL/min/1.7m2 36 (L) 31 (L) 37 (L) 38 (L) 39 (L) 39 (L) 39 (L)     Lab Results   Component Value Date     09/13/2017      Lab Results   Component Value Date    POTASSIUM 4.5 09/13/2017     Lab Results   Component Value Date    CHLORIDE 103 09/13/2017     Lab Results   Component Value Date    BRYANNA 8.9 09/13/2017     Lab Results   Component Value Date    CO2 24 09/13/2017     Lab Results   Component Value Date    BUN 29 09/13/2017     Lab Results   Component Value Date    CR 1.77 09/13/2017     Lab Results   Component Value Date     09/13/2017     Lab Results   Component Value Date    TSH 1.44 01/05/2017     No results found for: T4  Lab Results   Component Value Date    A1C 9.8 09/06/2017         Echo 6/5/2-17  Interpretation Summary  Moderate left ventricular dilation is present.  Severe diffuse hypokinesis is present.  The Ejection Fraction is estimated at 15-20%.  The inferior vena cava is normal.  No pericardial effusion is present.        Assessment and Plan  61 year old male with DM2, CKD, EF 15-20%    - Start Januvia 50 mg daily  - Continue Lantus 30 utnis QHS  - Continue Glipizide 10 mg daily  - Glucose log  - Exercise to continue  - If does not  improve, then we will consider to add on short acting for breakfast and lunch only  - RTC with me in 2-3 month with brief schedule    I spent 45 minutes with this patient face to face and explained the conditions and plans (more than 50% of time was counseling/coordination of care, DM management plan and follow up plan and glucose log to sent) . The patient understood and is satisfied with today's visit. Return to clinic with me in 3 months.         Yue Dowd MD  Staff Physician  Endocrinology and Metabolism  License: [id number]

## 2017-09-19 NOTE — LETTER
2017       RE: Lucian Oliveira  [address] [facility] [address]     Dear Colleague,    Thank you for referring your patient, Lucian Oliveira, to the [facility] ENDOCRINOLOGY at [facility]. Please see a copy of my visit note below.                   - Endocrinology Initial Consultation -    Reason for visit/consult:  DM2, CKD, Heart Failure with EF 15-20%    Primary care provider: Anna Archuleta    HPI: A 62 yo male here for the initial evaluation for his DM, came with wife and inerpreter. He is an immigrant from [place] in .   Diagnosed DM2 at age 45, diagnosed by PMD by regular check up.Prescribed oral meds but not much taking and only with diet exercise. Insulin use was started around 1 year ago, lantus was started with 15 utnis and now 30 units. Insulin at night. Used to take Metformin and was discontinued due to GFR.     He has heart failure, and CABG 2008 with defibrillator. He usually has no issue to walk, doing exercise in the gym every other day, walks more than 30 mins.     Glucometer forgot today. Isummarized based on the patient recalls.  This mornin this morning, 147 yesterday  Noon: 230, 200, 220  Night: 190, 180      Current regimens:  Glipizide 10 mg daily for many years (after breakfast)  Lantus 30 utnis at night    Life style:  Wake up 6am  Breakfast 8 am: coffee, beans egg, oatmeal, tortia: 45%  Lunch 1pm: squash, meat, vegi soup, soup, rice: 45%  Dinner 6pm: coffee, lina crackers, fruits: 10%  Bed time:       DM complications:  Retinopathy:   Nephropathy:   Neuropathy:   Most recent LDL:   LDL Cholesterol Calculated   Date Value Ref Range Status   2017 22 <100 mg/dL Final     Comment:     Desirable:       <100 mg/dl       Past Medical/Surgical History:  Past Medical History:   Diagnosis Date     NO ACTIVE PROBLEMS      Past Surgical History:   Procedure Laterality Date     C CABG, ARTERY-VEIN, THREE  2008     IMPLANT  "AUTOMATIC IMPLANTABLE CARDIOVERTER DEFIBRILLATOR         Allergies:  Allergies   Allergen Reactions     No Known Drug Allergies        Current Medications   Current Outpatient Prescriptions   Medication     insulin glargine (LANTUS SOLOSTAR) 100 UNIT/ML injection     B-D U/F insulin pen needle     furosemide (LASIX) 40 MG tablet     losartan (COZAAR) 50 MG tablet     glipiZIDE (GLIPIZIDE XL) 10 MG 24 hr tablet     carvedilol (COREG) 25 MG tablet     spironolactone (ALDACTONE) 25 MG tablet     clopidogrel (PLAVIX) 75 MG tablet     isosorbide mononitrate (IMDUR) 30 MG 24 hr tablet     blood glucose monitoring (ONE TOUCH ULTRA) test strip     ONE TOUCH ULTRA test strip     atorvastatin (LIPITOR) 40 MG tablet     blood glucose monitoring (ACCU-CHEK EFLIPE SMARTVIEW) meter device kit     order for DME     nitroglycerin (NITROSTAT) 0.4 MG SL tablet     aspirin 81 MG tablet     No current facility-administered medications for this visit.        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 Otherwise a complete 14 point review of systems was taken and is negative unless stated in the history above.      Physical Exam:   Blood pressure 107/67, pulse 70, height 1.676 m (5' 6\"), weight 89.7 kg (197 lb 12.8 oz).    General: well appearing, no acute distress, pleasant and conversant,   Mental Status/neuro: alert and " oriented  Face: symmetrical, normal facial color  Eyes: anicteric, PERRL, no proptosis or lid lag  Neck: suppler, no lymphadenopahty  Thyroid: normal size and texture, no nodule palpable, no bruits  Heart: regular rhythm, S1S2, no murmur appreciated  Lung: clear to auscultation bilaterally  Abdomen: soft, NT/ND, no hepatomegaly  Legs: no swelling or edema  Feet: no deformities or ulcers, 2+ DP pulses, normal monofilament sensation    Labs: I reviewed the lab from epic and extract/summarize pertinent lab here.      Ref. Range 1/5/2017 07:41 2/7/2017 07:19 4/27/2017 07:46 7/5/2017 07:32 9/6/2017 08:45   Hemoglobin A1C Latest Ref Range: 4.3 - 6.0 % 10.7 (H) 9.8 (H) 10.5 (H) 9.1 (H) 9.8 (H)     Results for ARLEEN ALFAROELLIS VILA (MRN [id number]) as of 9/19/2017 13:20   Ref. Range 11/7/2016 12:00 1/3/2017 08:57 1/12/2017 07:53 2/20/2017 07:38 4/25/2017 07:41 7/5/2017 07:32 9/13/2017 07:01   Creatinine Latest Ref Range: 0.66 - 1.25 mg/dL 1.89 (H) 2.16 (H) 1.86 (H) 1.81 (H) 1.77 (H) 1.77 (H) 1.77 (H)   GFR Estimate Latest Ref Range: >60 mL/min/1.7m2 36 (L) 31 (L) 37 (L) 38 (L) 39 (L) 39 (L) 39 (L)     Lab Results   Component Value Date     09/13/2017      Lab Results   Component Value Date    POTASSIUM 4.5 09/13/2017     Lab Results   Component Value Date    CHLORIDE 103 09/13/2017     Lab Results   Component Value Date    BRYANNA 8.9 09/13/2017     Lab Results   Component Value Date    CO2 24 09/13/2017     Lab Results   Component Value Date    BUN 29 09/13/2017     Lab Results   Component Value Date    CR 1.77 09/13/2017     Lab Results   Component Value Date     09/13/2017     Lab Results   Component Value Date    TSH 1.44 01/05/2017     No results found for: T4  Lab Results   Component Value Date    A1C 9.8 09/06/2017     Echo 6/5/2-17  Interpretation Summary  Moderate left ventricular dilation is present.  Severe diffuse hypokinesis is present.  The Ejection Fraction is estimated at 15-20%.  The inferior vena cava  is normal.  No pericardial effusion is present.    Assessment and Plan  61 year old male with DM2, CKD, EF 15-20%    - Start Januvia 50 mg daily  - Continue Lantus 30 utnis QHS  - Continue Glipizide 10 mg daily  - Glucose log  - Exercise to continue  - If does not improve, then we will consider to add on short acting for breakfast and lunch only  - RTC with me in 2-3 month with brief schedule    I spent 45 minutes with this patient face to face and explained the conditions and plans (more than 50% of time was counseling/coordination of care, DM management plan and follow up plan and glucose log to sent) . The patient understood and is satisfied with today's visit. Return to clinic with me in 3 months.     Yue Dowd MD  Staff Physician  Endocrinology and Metabolism  License: [id number]

## 2017-09-19 NOTE — MR AVS SNAPSHOT
After Visit Summary   9/19/2017    Lucian Oliveira    MRN: [id number]           Patient Information     Date Of Birth          1955        Visit Information        Provider Department      9/19/2017 12:45 PM Briana Pollack; Yue Dowd MD [facility]        Today's Diagnoses     Type 2 diabetes mellitus with hyperglycemia, with long-term current use of insulin (H)    -  1    Type 2 diabetes mellitus with stage 3 chronic kidney disease, with long-term current use of insulin (H)           Follow-ups after your visit        Your next 10 appointments already scheduled     Sep 27, 2017  9:00 AM CDT   [facility] with Anna Archuleta PA-C   [facility]    [address] [address] [phone]           Bring a current list of meds and any records pertaining to this visit. For Physicals, please bring immunization records and any forms needing to be filled out. Please arrive 10 minutes early to complete paperwork.            Dec 11, 2017 11:00 AM CST   (Arrive by 10:45 AM)   RETURN DIABETES with Yue Dowd MD   [facility] ([facility])    [address] [place] [id number]   [phone]            Dec 11, 2017  2:30 PM CST   Lab with [facility]   [facility])    [address] [place] [id number] [phone]            Dec 11, 2017  3:00 PM CST   (Arrive by 2:45 PM)   Implanted Defibulator with  Cv Device 1   [facility] ([facility])    [address] [place] [id number] [phone]            Dec 11, 2017  3:30 PM CST   (Arrive by 3:15 PM)   RETURN HEART FAILURE with Arvin Sheridan MD   [facility])    [address] [place] [address] [phone]          "     Who to contact     Please call your clinic at [phone] to:    Ask questions about your health    Make or cancel appointments    Discuss your medicines    Learn about your test results    Speak to your doctor   If you have compliments or concerns about an experience at your clinic, or if you wish to file a complaint, please contact [facility] Patient Relations at [phone] or email us at [email or web address]         Additional Information About Your Visit        [employer] Information     [employer] gives you secure access to your electronic health record. If you see a primary care provider, you can also send messages to your care team and make appointments. If you have questions, please call your primary care clinic.  If you do not have a primary care provider, please call [phone] and they will assist you.      [employer] is an electronic gateway that provides easy, online access to your medical records. With [employer], you can request a clinic appointment, read your test results, renew a prescription or communicate with your care team.     To access your existing account, please contact your [facility] or call [phone] for assistance.        Care EveryWhere ID     This is your Care EveryWhere ID. This could be used by other organizations to access your [place] medical records  [id number]        Your Vitals Were     Pulse Height BMI (Body Mass Index)             70 1.676 m (5' 6\") 31.93 kg/m2          Blood Pressure from Last 3 Encounters:   09/19/17 107/67   09/14/17 122/71   09/06/17 107/75    Weight from Last 3 Encounters:   09/19/17 89.7 kg (197 lb 12.8 oz)   09/14/17 89.4 kg (197 lb)   09/06/17 88.5 kg (195 lb)              Today, you had the following     No orders found for display         Today's Medication Changes          These changes are accurate as of: 9/19/17  1:57 PM.  If you have any questions, ask your nurse or doctor. "               Start taking these medicines.        Dose/Directions    sitagliptin 50 MG tablet   Commonly known as:  JANUVIA   Used for:  Type 2 diabetes mellitus with hyperglycemia, with long-term current use of insulin (H), Type 2 diabetes mellitus with stage 3 chronic kidney disease, with long-term current use of insulin (H)        Dose:  50 mg   Take 1 tablet (50 mg) by mouth daily   Quantity:  90 tablet   Refills:  5            Where to get your medicines      These medications were sent to [facility] [place] - [place], [place] - [address]  [address], [place] [address]     Phone:  [phone]     sitagliptin 50 MG tablet                Primary Care Provider Office Phone # Fax #    Anna Archuleta PA-C [phone] [address] [place] [address]        Equal Access to Services     [facility]: Hadii rajani escamilla hadasho Soomaali, waaxda luqadaha, qaybta kaalmada adeegyada, joana vasquez haymargarita graves . So [facility] [place] [facility] [phone].    ATENCIÓN: Si habla español, tiene a lainez disposición servicios gratuitos de asistencia lingüística. Llame al [phone].    We comply with applicable federal civil rights laws and [place] laws. We do not discriminate on the basis of race, color, national origin, age, disability sex, sexual orientation or gender identity.            Thank you!     Thank you for choosing [facility] ENDOCRINOLOGY  for your care. Our goal is always to provide you with excellent care. Hearing back from our patients is one way we can continue to improve our services. Please take a few minutes to complete the written survey that you may receive in the mail after your visit with us. Thank you!             Your Updated Medication List - Protect others around you: Learn how to safely use, store and throw away your medicines at www.disposemymeds.org.          This list is accurate as of: 9/19/17  1:57 PM.  Always use your most recent med  list.                   Brand Name Dispense Instructions for use Diagnosis    aspirin 81 MG tablet     30 tablet    Take 1 tablet (81 mg) by mouth daily    Ischemic cardiomyopathy       atorvastatin 40 MG tablet    LIPITOR    90 tablet    TAKE ONE TABLET BY MOUTH EVERY DAY    Chronic systolic heart failure (H), Wheezing, CKD (chronic kidney disease) stage 3, GFR 30-59 ml/min, CHF (NYHA class II, ACC/AHA stage C) (H)       B-D U/F 31G X 5 MM   Generic drug:  insulin pen needle     100 each    USE 1 NEEDLE DAILY AS DIRECTED    Type 2 diabetes, HbA1C goal < 8% (H)       blood glucose monitoring meter device kit     1 kit    Use to test blood sugar 3-4 times daily, as directed.    Type 2 diabetes mellitus with diabetic nephropathy, with long-term current use of insulin (H)       carvedilol 25 MG tablet    COREG    180 tablet    Take 1 tablet (25 mg) by mouth 2 times daily (with meals)    Hypertension goal BP (blood pressure) < 140/90       clopidogrel 75 MG tablet    PLAVIX    90 tablet    Take 1 tablet (75 mg) by mouth daily    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 (H)       furosemide 40 MG tablet    LASIX    180 tablet    Take 1 tablet (40 mg) by mouth daily - Take an extra 40mg as needed if weight goes up 2 pounds overnight, or more than 5 pounds in 1 week.    CHF (NYHA class II, ACC/AHA stage C) (H)       glipiZIDE 10 MG 24 hr tablet    glipiZIDE XL    90 tablet    Take 1 tablet (10 mg) by mouth daily    Type 2 diabetes mellitus with diabetic nephropathy, with long-term current use of insulin (H)       insulin glargine 100 UNIT/ML injection    LANTUS SOLOSTAR    15 mL    Inject 40 Units Subcutaneous At Bedtime    Type 2 diabetes mellitus with diabetic nephropathy, with long-term current use of insulin (H)       isosorbide mononitrate 30 MG 24 hr tablet    IMDUR    135 tablet    Take 1.5 tablets (45 mg) by mouth daily    Coronary artery disease  involving nonautologous biological coronary bypass graft with angina pectoris (H)       losartan 50 MG tablet    COZAAR    90 tablet    Take 1 tablet (50 mg) by mouth daily    CKD (chronic kidney disease) stage 3, GFR 30-59 ml/min, CHF (NYHA class II, ACC/AHA stage C) (H), Chronic systolic heart failure (H), Wheezing       nitroGLYcerin 0.4 MG sublingual tablet    NITROSTAT    10 tablet    Place 1 tablet (0.4 mg) under the tongue every 5 minutes as needed for chest pain If you are still having symptoms after 3 doses (15 minutes) call 911.    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 (H)       * ONE TOUCH ULTRA test strip   Generic drug:  blood glucose monitoring     100 each    USE TO TEST BLOOD SUGAR TWO TIMES A DAY OR AS DIRECTED    Type 2 diabetes mellitus with diabetic nephropathy, with long-term current use of insulin (H)       * blood glucose monitoring test strip    ONE TOUCH ULTRA    100 each    Use to test blood sugar 3 times daily or as directed.    Type 2 diabetes mellitus with diabetic nephropathy, with long-term current use of insulin (H)       order for DME     1 Units    Auto CPAP 8-15 cmH20        sitagliptin 50 MG tablet    JANUVIA    90 tablet    Take 1 tablet (50 mg) by mouth daily    Type 2 diabetes mellitus with hyperglycemia, with long-term current use of insulin (H), Type 2 diabetes mellitus with stage 3 chronic kidney disease, with long-term current use of insulin (H)       spironolactone 25 MG tablet    ALDACTONE    135 tablet    Take 1.5 tablets (37.5 mg) by mouth daily    CHF (NYHA class II, ACC/AHA stage C) (H)       * Notice:  This list has 2 medication(s) that are the same as other medications prescribed for you. Read the directions carefully, and ask your doctor or other care provider to review them with you.

## 2017-09-26 NOTE — PROGRESS NOTES
SUBJECTIVE:   Lucian Oliveira is a 61 year old male who presents to clinic today for the following health issues:      Diabetes Follow-up    Patient is checking blood sugars: 2-3 daily.    Blood sugar testing frequency justification: Uncontrolled diabetes  Results are as follows:       am - 114-194            postprandial after lunch- 160-218       bedtime - 185-215        Diabetic concerns: None     Symptoms of hypoglycemia (low blood sugar): none     Paresthesias (numbness or burning in feet) or sores: No     Date of last diabetic eye exam: one year ago    Doing 35 units of Lantus  Taking Januvia and Glipizide     Saw endocrine last week:  - Start Januvia 50 mg daily  - Continue Lantus 30 utnis QHS  - Continue Glipizide 10 mg daily  - Glucose log  - Exercise to continue  - If does not improve, then we will consider to add on short acting for breakfast and lunch only  - RTC with me in 2-3 month with brief schedule          Problem list and histories reviewed & adjusted, as indicated.  Additional history: as documented    Patient Active Problem List   Diagnosis     Coronary artery disease involving native coronary artery without angina pectoris     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)     Hyperlipidemia LDL goal <100     Hypertension goal BP (blood pressure) < 140/90     Advanced directives, counseling/discussion     CHF (NYHA class II, ACC/AHA stage C) (H)     CKD (chronic kidney disease) stage 3, GFR 30-59 ml/min     Health Care Home     Automatic implantable cardioverter-defibrillator - [employer] single lead ICD, Not Dependent     Chronic systolic heart failure (H)     Proteinuria     Vitamin D deficiency     OA (osteoarthritis) of knee     Chondromalacia of patella, unspecified laterality     Pain in joint of left shoulder     Tinnitus     Ptosis of right eyelid     Secondary renal hyperparathyroidism (H)     Cortical cataract of both eyes     Moderate nonproliferative diabetic  "retinopathy, without macular edema, associated with type 2 diabetes mellitus     Non morbid obesity due to excess calories     CHANTEL (obstructive sleep apnea)- severe (AHI 30)     Past Surgical History:   Procedure Laterality Date     C CABG, ARTERY-VEIN, THREE  02/2008     IMPLANT AUTOMATIC IMPLANTABLE CARDIOVERTER DEFIBRILLATOR         Social History   Substance Use Topics     Smoking status: Never Smoker     Smokeless tobacco: Never Used      Comment: Never smoked; non-smoking household     Alcohol use No      Comment: rare use     Family History   Problem Relation Age of Onset     DIABETES Brother      DIABETES Sister      DIABETES Sister      Macular Degeneration No family hx of      Glaucoma No family hx of      Myocardial Infarction No family hx of      KIDNEY DISEASE No family hx of              Reviewed and updated as needed this visit by clinical staffTobacco  Allergies  Meds       Reviewed and updated as needed this visit by Provider         ROS:  Constitutional, endocrine systems are negative, except as otherwise noted.      OBJECTIVE:                                                    /65  Pulse 77  Temp 98.6  F (37  C) (Oral)  Ht 5' 4.61\" (1.641 m)  Wt 196 lb 6.4 oz (89.1 kg)  SpO2 97%  BMI 33.08 kg/m2  Body mass index is 33.08 kg/(m^2).  GENERAL APPEARANCE: alert, no distress and over weight  MS: extremities normal- no gross deformities noted  NEURO: Normal strength and tone  PSYCH: mentation appears normal and affect normal/bright       ASSESSMENT:                                                      1. Type 2 diabetes mellitus with diabetic nephropathy, with long-term current use of insulin (H)    2. Need for prophylactic vaccination and inoculation against influenza         PLAN:                                                    Patient is seeing endo in December. I'll see him 1 week later. Will take a refresher course with diabetic ed. His blood sugar log was reviewed today and over " 75% of his readings are above goal.    Patient Instructions   Follow up with the diabetic educator/nutritionist for a refresher course and to go over carb counting again.  Follow up with the endocrinologist in 2 months (November).   I'd like to see you 1 week after you see the endocrinologist.    Seguimiento con el educador diabético / nutricionista para un curso de actualización y para ir más de contar los carbohidratos de nuevo.   Seguimiento con el endocrinólogo en 2 meses (noviembre).   Me gustaría verte shen semana después de caesar al endocrinólogo.    The patient was in agreement with the plan today and had no questions or concerns prior to leaving the clinic.      Anna Archuleta PA-C  [facility]

## 2017-09-27 ENCOUNTER — OFFICE VISIT (OUTPATIENT)
Dept: FAMILY MEDICINE | Facility: CLINIC | Age: 62
End: 2017-09-27
Payer: MEDICARE

## 2017-09-27 VITALS
SYSTOLIC BLOOD PRESSURE: 109 MMHG | TEMPERATURE: 98.6 F | BODY MASS INDEX: 32.72 KG/M2 | HEIGHT: 65 IN | HEART RATE: 77 BPM | DIASTOLIC BLOOD PRESSURE: 65 MMHG | OXYGEN SATURATION: 97 % | WEIGHT: 196.4 LBS

## 2017-09-27 DIAGNOSIS — E11.21 TYPE 2 DIABETES MELLITUS WITH DIABETIC NEPHROPATHY, WITH LONG-TERM CURRENT USE OF INSULIN (H): Primary | Chronic | ICD-10-CM

## 2017-09-27 DIAGNOSIS — Z23 NEED FOR PROPHYLACTIC VACCINATION AND INOCULATION AGAINST INFLUENZA: ICD-10-CM

## 2017-09-27 DIAGNOSIS — Z79.4 TYPE 2 DIABETES MELLITUS WITH DIABETIC NEPHROPATHY, WITH LONG-TERM CURRENT USE OF INSULIN (H): Primary | Chronic | ICD-10-CM

## 2017-09-27 PROCEDURE — 99213 OFFICE O/P EST LOW 20 MIN: CPT | Performed by: PHYSICIAN ASSISTANT

## 2017-09-27 ASSESSMENT — PAIN SCALES - GENERAL: PAINLEVEL: NO PAIN (0)

## 2017-09-27 NOTE — PATIENT INSTRUCTIONS
Follow up with the diabetic educator/nutritionist for a refresher course and to go over carb counting again.  Follow up with the endocrinologist in 2 months (November).   I'd like to see you 1 week after you see the endocrinologist.    Seguimiento con el educador diabético / nutricionista para un curso de actualización y para ir más de contar los carbohidratos de nuevo.   Seguimiento con el endocrinólogo en 2 meses (noviembre).   Me gustaría verte shen semana después de caesar al endocrinólogo.

## 2017-09-27 NOTE — MR AVS SNAPSHOT
After Visit Summary   9/27/2017    Lucian Oliveira    MRN: [id number]           Patient Information     Date Of Birth          1955        Visit Information        Provider Department      9/27/2017 9:00 AM Anna Archuleta PA-C; [employer] [facility]        Today's Diagnoses     Need for prophylactic vaccination and inoculation against influenza    -  1    Type 2 diabetes mellitus with diabetic nephropathy, with long-term current use of insulin (H)          Care Instructions    Follow up with the diabetic educator/nutritionist for a refresher course and to go over carb counting again.  Follow up with the endocrinologist in 2 months (November).   I'd like to see you 1 week after you see the endocrinologist.    Seguimiento con el educador diabético / nutricionista para un curso de actualización y para ir más de contar los carbohidratos de nuevo.   Seguimiento con el endocrinólogo en 2 meses (noviembre).   Me gustaría verte shen semana después de caesar al endocrinólogo.            Follow-ups after your visit        Additional Services     DIABETES EDUCATOR REFERRAL       DIABETES SELF MANAGEMENT TRAINING (DSMT)      Your provider has referred you to Diabetes Education: FMG: Diabetes Education - All [facility] [phone]   [email or web address]    Type of training and number of hours: Previous Diagnosis: Follow-up DSMT - 2 hours.    Medicare covers: 10 hours of initial DSMT in 12 month period from the time of first visit, plus 2 hours of follow-up DSMT annually, and additional hours as requested for insulin training.    Diabetes Type: Type 2 - On Insulin             Diabetes Co-Morbidities: atherosclerotic cardiovascular disease, dyslipidemia and hypertension               A1C Goal:  <8.0       A1C is: Lab Results       Component                Value               Date                       A1C                      9.8                  09/06/2017              If an urgent visit is needed or A1C is above 12, Care Team to call the Diabetes Education Team at [phone] or send an In Basket message to the Diabetes Education Pool (P DIAB ED-PATIENT CARE).    Diabetes Education Topics: Comprehensive Knowledge Assessment and Instruction    Special Educational Needs Requiring Individual DSMT: None       MEDICAL NUTRITION THERAPY (MNT) for Diabetes    Medical Nutrition Therapy with a Registered Dietitian can be provided in coordination with Diabetes Self-Management Training to assist in achieving optimal diabetes management.    MNT Type and Hours: Do not initiate MNT at this time.                       Medicare will cover: 3 hours initial MNT in 12 month period after first visit, plus 2 hours of follow-up MNT annually    Please be aware that coverage of these services is subject to the terms and limitations of your health insurance plan.  Call member services at your health plan to determine Diabetes Self-Management Training benefits and ask which blood glucose monitor brands are covered by your plan.      Please bring the following with you to your appointment:    (1)  List of current medications   (2)  List of Blood Glucose Monitor brands that are covered by your insurance plan  (3)  Blood Glucose Monitor and log book  (4)   Food records for the 3 days prior to your visit    The Certified Diabetes Educator may make diabetes medication adjustments per the CDE Protocol and Collaborative Practice Agreement.                  Your next 10 appointments already scheduled     Dec 11, 2017 11:00 AM CST   (Arrive by 10:45 AM)   RETURN DIABETES with Yue Dowd MD   [facility] ([facility])    [address] [place] [id number]   [phone]            Dec 11, 2017  2:30 PM CST   Lab with  LAB   [facility] ([facility])    [address] [place] [address]  "[facility] [address]   [phone]            Dec 11, 2017  3:00 PM CST   (Arrive by 2:45 PM)   Implanted Defibulator with Uc Cv Device 1   [facility] ([facility])    [address] [place] [address]  [address] [facility] [address]   [phone]            Dec 11, 2017  3:30 PM CST   (Arrive by 3:15 PM)   RETURN HEART FAILURE with Arvin Sheridan MD   [facility])    [address] [place] [id number] [phone]              Who to contact     Normal or non-critical lab and imaging results will be communicated to you by Nexanthart, letter or phone within 4 business days after the clinic has received the results. If you do not hear from us within 7 days, please contact the clinic through Nexanthart or phone. If you have a critical or abnormal lab result, we will notify you by phone as soon as possible.  Submit refill requests through Lumetrics or call your pharmacy and they will forward the refill request to us. Please allow 3 business days for your refill to be completed.          If you need to speak with a  for additional information , please call: [phone]             Additional Information About Your Visit        Lumetrics Information     Lumetrics gives you secure access to your electronic health record. If you see a primary care provider, you can also send messages to your care team and make appointments. If you have questions, please call your primary care clinic.  If you do not have a primary care provider, please call [phone] and they will assist you.        Care EveryWhere ID     This is your Care EveryWhere ID. This could be used by other organizations to access your [place] medical records  [id number]        Your Vitals Were     Pulse Temperature Height Pulse Oximetry BMI (Body Mass Index)       77 98.6  F (37  C) (Oral) 5' 4.61\" (1.641 m) 97% 33.08 kg/m2        Blood " Pressure from Last 3 Encounters:   09/27/17 109/65   09/19/17 107/67   09/14/17 122/71    Weight from Last 3 Encounters:   09/27/17 196 lb 6.4 oz (89.1 kg)   09/19/17 197 lb 12.8 oz (89.7 kg)   09/14/17 197 lb (89.4 kg)              We Performed the Following     DIABETES EDUCATOR REFERRAL        Primary Care Provider Office Phone # Fax #    Anna Archuleta PA-C [phone]       [address] [facility] [address]        Equal Access to Services     [facility]: Hadii aad ku hadasho Soomaali, waaxda luqadaha, qaybta kaalmada adeegyada, joana vasquez hayerneston adeyoung graves . So [facility] [phone].    ATENCIÓN: Si habla español, tiene a lainez disposición servicios gratuitos de asistencia lingüística. [facility] [phone].    We comply with applicable federal civil rights laws and [place] laws. We do not discriminate on the basis of race, color, national origin, age, disability sex, sexual orientation or gender identity.            Thank you!     Thank you for choosing [facility]  for your care. Our goal is always to provide you with excellent care. Hearing back from our patients is one way we can continue to improve our services. Please take a few minutes to complete the written survey that you may receive in the mail after your visit with us. Thank you!             Your Updated Medication List - Protect others around you: Learn how to safely use, store and throw away your medicines at www.disposemymeds.org.          This list is accurate as of: 9/27/17  9:19 AM.  Always use your most recent med list.                   Brand Name Dispense Instructions for use Diagnosis    aspirin 81 MG tablet     30 tablet    Take 1 tablet (81 mg) by mouth daily    Ischemic cardiomyopathy       atorvastatin 40 MG tablet    LIPITOR    90 tablet    TAKE ONE TABLET BY MOUTH EVERY DAY    Chronic systolic heart failure (H), Wheezing, CKD (chronic kidney disease) stage 3, GFR 30-59 ml/min, CHF (NYHA  class II, ACC/AHA stage C) (H)       B-D U/F 31G X 5 MM   Generic drug:  insulin pen needle     100 each    USE 1 NEEDLE DAILY AS DIRECTED    Type 2 diabetes, HbA1C goal < 8% (H)       blood glucose monitoring meter device kit     1 kit    Use to test blood sugar 3-4 times daily, as directed.    Type 2 diabetes mellitus with diabetic nephropathy, with long-term current use of insulin (H)       carvedilol 25 MG tablet    COREG    180 tablet    Take 1 tablet (25 mg) by mouth 2 times daily (with meals)    Hypertension goal BP (blood pressure) < 140/90       clopidogrel 75 MG tablet    PLAVIX    90 tablet    Take 1 tablet (75 mg) by mouth daily    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 (H)       furosemide 40 MG tablet    LASIX    180 tablet    Take 1 tablet (40 mg) by mouth daily - Take an extra 40mg as needed if weight goes up 2 pounds overnight, or more than 5 pounds in 1 week.    CHF (NYHA class II, ACC/AHA stage C) (H)       glipiZIDE 10 MG 24 hr tablet    glipiZIDE XL    90 tablet    Take 1 tablet (10 mg) by mouth daily    Type 2 diabetes mellitus with diabetic nephropathy, with long-term current use of insulin (H)       insulin glargine 100 UNIT/ML injection    LANTUS SOLOSTAR    15 mL    Inject 40 Units Subcutaneous At Bedtime    Type 2 diabetes mellitus with diabetic nephropathy, with long-term current use of insulin (H)       isosorbide mononitrate 30 MG 24 hr tablet    IMDUR    135 tablet    Take 1.5 tablets (45 mg) by mouth daily    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 (H)       losartan 50 MG tablet    COZAAR    90 tablet    Take 1 tablet (50 mg) by mouth daily    CKD (chronic kidney disease) stage 3, GFR 30-59 ml/min, CHF (NYHA class II, ACC/AHA stage C) (H), Chronic systolic heart failure (H), Wheezing       nitroGLYcerin 0.4 MG sublingual tablet    NITROSTAT    10 tablet     Place 1 tablet (0.4 mg) under the tongue every 5 minutes as needed for chest pain If you are still having symptoms after 3 doses (15 minutes) call 911.    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 (H)       * ONE TOUCH ULTRA test strip   Generic drug:  blood glucose monitoring     100 each    USE TO TEST BLOOD SUGAR TWO TIMES A DAY OR AS DIRECTED    Type 2 diabetes mellitus with diabetic nephropathy, with long-term current use of insulin (H)       * blood glucose monitoring test strip    ONE TOUCH ULTRA    100 each    Use to test blood sugar 3 times daily or as directed.    Type 2 diabetes mellitus with diabetic nephropathy, with long-term current use of insulin (H)       order for DME     1 Units    Auto CPAP 8-15 cmH20        sitagliptin 50 MG tablet    JANUVIA    90 tablet    Take 1 tablet (50 mg) by mouth daily    Type 2 diabetes mellitus with hyperglycemia, with long-term current use of insulin (H), Type 2 diabetes mellitus with stage 3 chronic kidney disease, with long-term current use of insulin (H)       spironolactone 25 MG tablet    ALDACTONE    135 tablet    Take 1.5 tablets (37.5 mg) by mouth daily    CHF (NYHA class II, ACC/AHA stage C) (H)       * Notice:  This list has 2 medication(s) that are the same as other medications prescribed for you. Read the directions carefully, and ask your doctor or other care provider to review them with you.

## 2017-09-27 NOTE — NURSING NOTE
"Chief Complaint   Patient presents with     Diabetes       Initial /65  Pulse 77  Temp 98.6  F (37  C) (Oral)  Ht 5' 4.61\" (1.641 m)  Wt 196 lb 6.4 oz (89.1 kg)  SpO2 97%  BMI 33.08 kg/m2 Estimated body mass index is 33.08 kg/(m^2) as calculated from the following:    Height as of this encounter: 5' 4.61\" (1.641 m).    Weight as of this encounter: 196 lb 6.4 oz (89.1 kg).  Medication Reconciliation: complete    "

## 2017-10-13 ENCOUNTER — ALLIED HEALTH/NURSE VISIT (OUTPATIENT)
Dept: EDUCATION SERVICES | Facility: CLINIC | Age: 62
End: 2017-10-13
Payer: MEDICARE

## 2017-10-13 VITALS — BODY MASS INDEX: 32.68 KG/M2 | WEIGHT: 194 LBS

## 2017-10-13 DIAGNOSIS — E11.21 TYPE 2 DIABETES MELLITUS WITH DIABETIC NEPHROPATHY (H): Primary | ICD-10-CM

## 2017-10-13 PROCEDURE — G0108 DIAB MANAGE TRN  PER INDIV: HCPCS

## 2017-10-13 NOTE — PATIENT INSTRUCTIONS
Recommend to eat 3-4 carb choices for 3 meals per day and 0-2 carb choices for a snack if desired.    Work to learn what foods are carbohydrate and the portions that are 1 serving (or carb choice).    Continue to be active as you are.      Blood sugar goals:  Before meals  Blood sugar goal-  mg/dl   2 hours after the start of a meal blood sugar goal is less than 180 mg/dl.    Call if questions and call to schedule an appointment with Bouchra at [facility] on a Thursday ini 3-4 weeks.

## 2017-10-13 NOTE — MR AVS SNAPSHOT
After Visit Summary   10/13/2017    Lucian Oliveira    MRN: [id number]           Patient Information     Date Of Birth          1955        Visit Information        Provider Department      10/13/2017 8:30 AM [employer]; [facility] DIABETIC ED RESOURCE [facility] Instructions    Recommend to eat 3-4 carb choices for 3 meals per day and 0-2 carb choices for a snack if desired.    Work to learn what foods are carbohydrate and the portions that are 1 serving (or carb choice).    Continue to be active as you are.      Blood sugar goals:  Before meals  Blood sugar goal-  mg/dl   2 hours after the start of a meal blood sugar goal is less than 180 mg/dl.    Call if questions and call to schedule an appointment with Bouchra at [facility] on a Thursday ini 3-4 weeks.            Follow-ups after your visit        Your next 10 appointments already scheduled     Dec 11, 2017 11:00 AM CST   (Arrive by 10:45 AM)   RETURN DIABETES with Yue Dowd MD   [facility] ([facility])    [address] [place] [id number] [phone]            Dec 11, 2017  2:30 PM CST   Lab with UC LAB   [facility] ([facility])    [address] [place] [id number] [phone]            Dec 11, 2017  3:00 PM CST   (Arrive by 2:45 PM)   Implanted Defibulator with  Cv Device 1   [facility] ([facility])    [address] [place] [id number] [phone]            Dec 11, 2017  3:30 PM CST   (Arrive by 3:15 PM)   RETURN HEART FAILURE with Arvin Sheridan MD   [facility] ([facility])    [address] [place] [id number] [phone]              Who to contact     If you have questions or need follow up information about today's clinic  visit or your schedule please contact [facility] directly at [phone].  Normal or non-critical lab and imaging results will be communicated to you by MyChart, letter or phone within 4 business days after the clinic has received the results. If you do not hear from us within 7 days, please contact the clinic through Sprucelinghart or phone. If you have a critical or abnormal lab result, we will notify you by phone as soon as possible.  Submit refill requests through 5o9 or call your pharmacy and they will forward the refill request to us. Please allow 3 business days for your refill to be completed.          Additional Information About Your Visit        SprucelingharStylefie Information     5o9 gives you secure access to your electronic health record. If you see a primary care provider, you can also send messages to your care team and make appointments. If you have questions, please call your primary care clinic.  If you do not have a primary care provider, please call [phone] and they will assist you.        Care EveryWhere ID     This is your Care EveryWhere ID. This could be used by other organizations to access your [place] medical records  [id number]        Your Vitals Were     BMI (Body Mass Index)                   32.68 kg/m2            Blood Pressure from Last 3 Encounters:   09/27/17 109/65   09/19/17 107/67   09/14/17 122/71    Weight from Last 3 Encounters:   10/13/17 88 kg (194 lb)   09/27/17 89.1 kg (196 lb 6.4 oz)   09/19/17 89.7 kg (197 lb 12.8 oz)              Today, you had the following     No orders found for display       Primary Care Provider Office Phone # Fax #    Anna Archuleta PA-C [phone]       [address] [place] [address]        Equal Access to Services     MARIBETH RUSH : Jef Junior, watomas novak, qaybta kaaldebra dallas, joana pulido. So [facility] [phone].    ATENCIÓN: Ghislaine hoffman  español, tiene a lainez disposición servicios gratuitos de asistencia lingüística. Daomn mayberry [phone].    We comply with applicable federal civil rights laws and [place] laws. We do not discriminate on the basis of race, color, national origin, age, disability, sex, sexual orientation, or gender identity.            Thank you!     Thank you for choosing [facility]  for your care. Our goal is always to provide you with excellent care. Hearing back from our patients is one way we can continue to improve our services. Please take a few minutes to complete the written survey that you may receive in the mail after your visit with us. Thank you!             Your Updated Medication List - Protect others around you: Learn how to safely use, store and throw away your medicines at www.disposemymeds.org.          This list is accurate as of: 10/13/17 10:17 AM.  Always use your most recent med list.                   Brand Name Dispense Instructions for use Diagnosis    aspirin 81 MG tablet     30 tablet    Take 1 tablet (81 mg) by mouth daily    Ischemic cardiomyopathy       atorvastatin 40 MG tablet    LIPITOR    90 tablet    TAKE ONE TABLET BY MOUTH EVERY DAY    Chronic systolic heart failure (H), Wheezing, CKD (chronic kidney disease) stage 3, GFR 30-59 ml/min, CHF (NYHA class II, ACC/AHA stage C) (H)       B-D U/F 31G X 5 MM   Generic drug:  insulin pen needle     100 each    USE 1 NEEDLE DAILY AS DIRECTED    Type 2 diabetes, HbA1C goal < 8% (H)       blood glucose monitoring meter device kit     1 kit    Use to test blood sugar 3-4 times daily, as directed.    Type 2 diabetes mellitus with diabetic nephropathy, with long-term current use of insulin (H)       carvedilol 25 MG tablet    COREG    180 tablet    Take 1 tablet (25 mg) by mouth 2 times daily (with meals)    Hypertension goal BP (blood pressure) < 140/90       clopidogrel 75 MG tablet    PLAVIX    90 tablet    Take 1 tablet (75 mg) by  mouth daily    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 (H)       furosemide 40 MG tablet    LASIX    180 tablet    Take 1 tablet (40 mg) by mouth daily - Take an extra 40mg as needed if weight goes up 2 pounds overnight, or more than 5 pounds in 1 week.    CHF (NYHA class II, ACC/AHA stage C) (H)       glipiZIDE 10 MG 24 hr tablet    glipiZIDE XL    90 tablet    Take 1 tablet (10 mg) by mouth daily    Type 2 diabetes mellitus with diabetic nephropathy, with long-term current use of insulin (H)       insulin glargine 100 UNIT/ML injection    LANTUS SOLOSTAR    15 mL    Inject 35 Units Subcutaneous At Bedtime    Type 2 diabetes mellitus with diabetic nephropathy, with long-term current use of insulin (H)       isosorbide mononitrate 30 MG 24 hr tablet    IMDUR    135 tablet    Take 1.5 tablets (45 mg) by mouth daily    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 (H)       * ONE TOUCH ULTRA test strip   Generic drug:  blood glucose monitoring     100 each    USE TO TEST BLOOD SUGAR TWO TIMES A DAY OR AS DIRECTED    Type 2 diabetes mellitus with diabetic nephropathy, with long-term current use of insulin (H)       * blood glucose monitoring test strip    ONE TOUCH ULTRA    100 each    Use to test blood sugar 3 times daily or as directed.     Type 2 diabetes mellitus with diabetic nephropathy, with long-term current use of insulin (H)       order for DME     1 Units    Auto CPAP 8-15 cmH20        sitagliptin 50 MG tablet    JANUVIA    90 tablet    Take 1 tablet (50 mg) by mouth daily    Type 2 diabetes mellitus with hyperglycemia, with long-term current use of insulin (H), Type 2 diabetes mellitus with stage 3 chronic kidney disease, with long-term current use of insulin (H)       spironolactone 25 MG tablet    ALDACTONE    135 tablet    Take 1.5 tablets (37.5 mg) by mouth daily    CHF (NYHA class II, ACC/AHA stage C) (H)       * Notice:  This list has 2 medication(s) that are the same as other medications prescribed for you. Read the directions carefully, and ask your doctor or other care provider to review them with you.

## 2017-10-13 NOTE — PROGRESS NOTES
Diabetes Self Management Training: Individual Review Visit    Lucian Oliveira presents today for education related to Type 2 diabetes.    He is accompanied by spouse and professional     Patient's diabetes management related comments/concerns: We need to learn about food.    Patient's emotional response to diabetes: expresses readiness to learn    Patient would like this visit to be focused around the following diabetes-related behaviors and goals: Healthy Eating    ASSESSMENT:  Patient Problem List and Family Medical History reviewed for relevant medical history, current medical status, and diabetes risk factors.    Current Diabetes Management per Patient:  Taking diabetes medications?   yes:     Diabetes Medication(s)     Dipeptidyl Peptidase-4 (DPP-4) Inhibitors Sig    sitagliptin (JANUVIA) 50 MG tablet Take 1 tablet (50 mg) by mouth daily    Insulin Sig    insulin glargine (LANTUS SOLOSTAR) 100 UNIT/ML injection Inject 35 Units Subcutaneous At Bedtime    Sulfonylureas Sig    glipiZIDE (GLIPIZIDE XL) 10 MG 24 hr tablet Take 1 tablet (10 mg) by mouth daily          *Abbreviated insulin dose documentation key: Insulin Trade Name (uegmeclae-azgla-jdiozq-bedtime) - i.e. Humalog 5-5-5-0 (Humalog 5 units at breakfast, 5 units at lunch, and 5 units at dinner).    Past Diabetes Education: Yes    Patient glucose self monitoring as follows: yes  BG meter: Does not know the name of his meter and he did not bring it today but is requested to bring to all visits for diabetes.  BG results: not available     BG values are: unable to assess  Patient's most recent   Lab Results   Component Value Date    A1C 9.8 09/06/2017    is not meeting goal of <8.0    Nutrition:  Patient eats 3 meals per day  Gets up 7 am  Breakfast - 7:30-8 am-coffee with milk and no sugar, eggs and vegetables, 1- 2 tortilla corn and beans 1/2 cup  Lunch - 12:30-1 pm-  Chicken and vegetables, fried rice and noodles  Dinner - 5-6 pm- Coffee and  "cookies  4-5 small 2\" diameter  Snacks -  Fruit, peanuts, or cashews    Beverages:  coffee with  1/4 cup milk per cup, water, no pop,  4 oz of orange juice, no alcohol    Cultural/Christianity diet restrictions: Yes No meat on Fridays    Biggest Challenge to Healthy Eating: portion control, eating out and bread, fried food.    Physical Activity:    Type: goes to the gym every other day for 90 minutes.  Does treadmill 30 minutes, bicycle for 30 minutes, lifts weights 20 minutes.  Also goes for a walk with his spouse at other times.    Diabetes Complications:  Not discussed today.    Vitals:  Wt 88 kg (194 lb)  BMI 32.68 kg/m2  Estimated body mass index is 33.08 kg/(m^2) as calculated from the following:    Height as of 9/27/17: 1.641 m (5' 4.61\").    Weight as of 9/27/17: 89.1 kg (196 lb 6.4 oz).   Last 3 BP:   BP Readings from Last 3 Encounters:   09/27/17 109/65   09/19/17 107/67   09/14/17 122/71       History   Smoking Status     Never Smoker   Smokeless Tobacco     Never Used     Comment: Never smoked; non-smoking household       Labs:  Lab Results   Component Value Date    A1C 9.8 09/06/2017     Lab Results   Component Value Date     09/13/2017     Lab Results   Component Value Date    LDL 22 07/05/2017     HDL Cholesterol   Date Value Ref Range Status   07/05/2017 26 (L) >39 mg/dL Final   ]  GFR Estimate   Date Value Ref Range Status   09/13/2017 39 (L) >60 mL/min/1.7m2 Final     Comment:     Non  GFR Calc     GFR Estimate If Black   Date Value Ref Range Status   09/13/2017 47 (L) >60 mL/min/1.7m2 Final     Comment:      GFR Calc     Lab Results   Component Value Date    CR 1.77 09/13/2017     No results found for: MICROALBUMIN    Socio/Economic Considerations:    Support system: spouse/significant other    Health Beliefs and Attitudes:   Patient Activation Measure Survey Score:  JAYA Score (Last Two) 1/28/2013 11/13/2014   JAYA Raw Score 42 39   Activation Score 66 56.4 "   JAYA Level 3 3       Stage of Change: PREPARATION (Decided to change - considering how)      Diabetes knowledge and skills assessment:     Patient is knowledgeable in diabetes management concepts related to: Being Active    Patient needs further education on the following diabetes management concepts: Healthy Eating and Healthy Coping    Barriers to Learning Assessment: English as a second language (ESL) and needs .    Based on learning assessment above, most appropriate setting for further diabetes education would be: Individual setting.    INTERVENTION:   Education provided today on:  AADE Self-Care Behaviors:  Healthy Eating: carbohydrate counting, consistency in amount, composition, and timing of food intake, eating out, portion control and plate planning method using food models and My Plate planner.  Healthy Coping: benefits of making appropriate lifestyle changes    Opportunities for ongoing education and support in diabetes-self management were discussed.    Pt verbalized understanding of concepts discussed and recommendations provided today.   Patient appreciated information.    Education Materials Provided:  Carbohydrate Counting, My Plate Planner and Diabetes Meal Plan basics from [facility] Patient and family Education all printed in Maltese.    PLAN:  See Patient Instructions for co-developed, patient-stated behavior change goals.  AVS printed and provided to patient today.    FOLLOW-UP:  Patient to call to schedule follow up appointment with RD in 1 month  Chart routed to referring provider.    Adeola Howe RN  BSN CDE    Time Spent: 120 minutes  Encounter Type: Individual    Any diabetes medication dose changes were made via the CDE Protocol and Collaborative Practice Agreement with the patient's referring provider. A copy of this encounter was shared with the provider.

## 2017-11-09 ENCOUNTER — MYC MEDICAL ADVICE (OUTPATIENT)
Dept: FAMILY MEDICINE | Facility: CLINIC | Age: 62
End: 2017-11-09

## 2017-11-09 NOTE — TELEPHONE ENCOUNTER
Panel Management Review      Patient has the following on his problem list:     Diabetes    ASA: Passed    Last A1C  Lab Results   Component Value Date    A1C 9.8 09/06/2017    A1C 9.1 07/05/2017    A1C 10.5 04/27/2017    A1C 9.8 02/07/2017    A1C 10.7 01/05/2017     A1C tested: FAILED    Last LDL:    Lab Results   Component Value Date    CHOL 93 07/05/2017     Lab Results   Component Value Date    HDL 26 07/05/2017     Lab Results   Component Value Date    LDL 22 07/05/2017     Lab Results   Component Value Date    TRIG 226 07/05/2017     Lab Results   Component Value Date    CHOLHDLRATIO 2.6 06/27/2015     Lab Results   Component Value Date    NHDL 67 07/05/2017       Is the patient on a Statin? YES             Is the patient on Aspirin? YES    Medications     HMG CoA Reductase Inhibitors    atorvastatin (LIPITOR) 40 MG tablet    Salicylates    aspirin 81 MG tablet          Last three blood pressure readings:  BP Readings from Last 3 Encounters:   09/27/17 109/65   09/19/17 107/67   09/14/17 122/71       Date of last diabetes office visit: 9/27/17     Tobacco History:     History   Smoking Status     Never Smoker   Smokeless Tobacco     Never Used     Comment: Never smoked; non-smoking household         Hypertension   Last three blood pressure readings:  BP Readings from Last 3 Encounters:   09/27/17 109/65   09/19/17 107/67   09/14/17 122/71     Blood pressure: FAILED    HTN Guidelines:  Age 18-59 BP range:  Less than 140/90  Age 60-85 with Diabetes:  Less than 140/90  Age 60-85 without Diabetes:  less than 150/90          Composite cancer screening  Chart review shows that this patient is due/due soon for the following None  Summary:    Patient is due/failing the following:   A1C    Action needed:   Patient needs office visit for diabetes.    Type of outreach:    Sent [employer] message.    Questions for provider review:    None                                                                                                                                     Jyotsna Waldron [facility]     Chart routed to Care Team .

## 2017-11-09 NOTE — LETTER
[facility]  [address] [id number]  [phone]        November 16, 2017    Lucian Oliveira  [address] [address]              Dear Lucian Oliveira    This is to remind you that your due for a diabetic visit at the end of December with MISTY Castillo.     You may call our office at [phone] to schedule an appointment.          Sincerely,        [facility]

## 2017-11-30 DIAGNOSIS — Z79.4 TYPE 2 DIABETES MELLITUS WITH DIABETIC NEPHROPATHY, WITH LONG-TERM CURRENT USE OF INSULIN (H): Chronic | ICD-10-CM

## 2017-11-30 DIAGNOSIS — E11.21 TYPE 2 DIABETES MELLITUS WITH DIABETIC NEPHROPATHY, WITH LONG-TERM CURRENT USE OF INSULIN (H): Chronic | ICD-10-CM

## 2017-11-30 RX ORDER — INSULIN GLARGINE 100 [IU]/ML
INJECTION, SOLUTION SUBCUTANEOUS
Qty: 15 ML | Refills: 1 | Status: SHIPPED | OUTPATIENT
Start: 2017-11-30 | End: 2018-01-25

## 2017-12-19 ENCOUNTER — TELEPHONE (OUTPATIENT)
Dept: PHARMACY | Facility: CLINIC | Age: 62
End: 2017-12-19

## 2017-12-19 NOTE — TELEPHONE ENCOUNTER
We have been unable to reach this patient for MTM follow-up after several attempts. We will stop reaching out to the patient at this time. Please let us know if we can assist in this patient's care in the future.    Routing to PCP as Mert KingD, [facility]  Medication Therapy Management Provider  Pager: [phone]

## 2018-01-15 ENCOUNTER — PRE VISIT (OUTPATIENT)
Dept: CARDIOLOGY | Facility: CLINIC | Age: 63
End: 2018-01-15

## 2018-01-15 DIAGNOSIS — I50.22 CHRONIC SYSTOLIC HEART FAILURE (H): Primary | Chronic | ICD-10-CM

## 2018-03-22 ENCOUNTER — TELEPHONE (OUTPATIENT)
Dept: FAMILY MEDICINE | Facility: CLINIC | Age: 63
End: 2018-03-22

## 2018-03-22 NOTE — TELEPHONE ENCOUNTER
Panel Management Review      Patient has the following on his problem list:     Diabetes    ASA: Failed    Last A1C  Lab Results   Component Value Date    A1C 9.8 09/06/2017    A1C 9.1 07/05/2017    A1C 10.5 04/27/2017    A1C 9.8 02/07/2017    A1C 10.7 01/05/2017     A1C tested: FAILED    Last LDL:    Lab Results   Component Value Date    CHOL 93 07/05/2017     Lab Results   Component Value Date    HDL 26 07/05/2017     Lab Results   Component Value Date    LDL 22 07/05/2017     Lab Results   Component Value Date    TRIG 226 07/05/2017     Lab Results   Component Value Date    CHOLHDLRATIO 2.6 06/27/2015     Lab Results   Component Value Date    NHDL 67 07/05/2017       Is the patient on a Statin? YES             Is the patient on Aspirin? YES    Medications     HMG CoA Reductase Inhibitors    atorvastatin (LIPITOR) 40 MG tablet    Salicylates    aspirin 81 MG tablet          Last three blood pressure readings:  BP Readings from Last 3 Encounters:   09/27/17 109/65   09/19/17 107/67   09/14/17 122/71       Date of last diabetes office visit: 09/27/2017     Tobacco History:     History   Smoking Status     Never Smoker   Smokeless Tobacco     Never Used     Comment: Never smoked; non-smoking household         Hypertension   Last three blood pressure readings:  BP Readings from Last 3 Encounters:   09/27/17 109/65   09/19/17 107/67 09/14/17 122/71     Blood pressure: Passed    HTN Guidelines:  Age 18-59 BP range:  Less than 140/90  Age 60-85 with Diabetes:  Less than 140/90  Age 60-85 without Diabetes:  less than 150/90      Composite cancer screening  Chart review shows that this patient is due/due soon for the following None  Summary:    Patient is due/failing the following:   A1C    Action needed:   Patient needs office visit for Diabetes follow up.    Type of outreach:    Phone, left message for patient to call back.     Questions for provider review:    None                                                                                                                                     Tate Waldron MA     Chart routed to Care Team .

## 2018-03-28 NOTE — TELEPHONE ENCOUNTER
called daughter's mobile number   left message to call pod 2   Patient is due for Diabetes follow up  Tate Waldron MA

## 2018-04-12 NOTE — TELEPHONE ENCOUNTER
Called patient no answer left message to call back  Patient needs an office visit for Diabetes  Tate Waldron MA

## 2018-04-16 NOTE — TELEPHONE ENCOUNTER
Spoke to patient's son Scarlett he said father was out of town and will be back tomorrow 04/17/18. He will inform his father to call and make Diabetes appt.    Tate Waldron MA

## 2018-04-19 NOTE — TELEPHONE ENCOUNTER
Patient has Diabetes appointment on 04/30/18 at 10:15pm in [facility]  Tate Waldron MA

## 2018-04-20 DIAGNOSIS — Z79.4 TYPE 2 DIABETES MELLITUS WITH DIABETIC NEPHROPATHY, WITH LONG-TERM CURRENT USE OF INSULIN (H): Chronic | ICD-10-CM

## 2018-04-20 DIAGNOSIS — N18.30 CKD (CHRONIC KIDNEY DISEASE) STAGE 3, GFR 30-59 ML/MIN (H): ICD-10-CM

## 2018-04-20 DIAGNOSIS — I50.22 CHRONIC SYSTOLIC HEART FAILURE (H): ICD-10-CM

## 2018-04-20 DIAGNOSIS — E11.21 TYPE 2 DIABETES MELLITUS WITH DIABETIC NEPHROPATHY, WITH LONG-TERM CURRENT USE OF INSULIN (H): Chronic | ICD-10-CM

## 2018-04-20 DIAGNOSIS — I50.9 CHF (NYHA CLASS II, ACC/AHA STAGE C) (H): ICD-10-CM

## 2018-04-20 DIAGNOSIS — R06.2 WHEEZING: ICD-10-CM

## 2018-04-20 NOTE — TELEPHONE ENCOUNTER
"Requested Prescriptions   Pending Prescriptions Disp Refills     atorvastatin (LIPITOR) 40 MG tablet [Pharmacy Med Name: ATORVASTATIN CALCIUM 40MG TABS] 90 tablet 3    Last Written Prescription Date:  01/25/18  Last Fill Quantity: 90,  # refills: 3   Last office visit: 9/27/2017 with prescribing provider:  KIMBERLEY Lu Office Visit:     Sig: TAKE ONE TABLET BY MOUTH EVERY DAY    Statins Protocol Passed    4/20/2018  9:17 AM       Passed - LDL on file in past 12 months    Recent Labs   Lab Test  07/05/17   0732   LDL  22            Passed - No abnormal creatine kinase in past 12 months    No lab results found.            Passed - Recent (12 mo) or future (30 days) visit within the authorizing provider's specialty    Patient had office visit in the last 12 months or has a visit in the next 30 days with authorizing provider or within the authorizing provider's specialty.  See \"Patient Info\" tab in inbasket, or \"Choose Columns\" in Meds & Orders section of the refill encounter.           Passed - Patient is age 18 or older        LANTUS SOLOSTAR 100 UNIT/ML soln [Pharmacy Med Name: LANTUS SOLOSTAR 100UNIT/ML SOPN] 15 mL 0    Last Written Prescription Date:  01/25/18  Last Fill Quantity: 45,  # refills: 0   Last office visit: 9/27/2017 with prescribing provider:  KIMBERLEY Lu Office Visit:     Sig: INJECT 40 UNITS UNDER THE SKIN AT BEDTIME    Long Acting Insulin Protocol Failed    4/20/2018  9:17 AM       Failed - Blood pressure less than 140/90 in past 6 months    BP Readings from Last 3 Encounters:   09/27/17 109/65   09/19/17 107/67   09/14/17 122/71                Failed - HgbA1C in past 3 or 6 months    Recent Labs   Lab Test  09/06/17   0845   A1C  9.8*            Failed - Recent (6 mo) or future (30 days) visit within the authorizing provider's specialty    Patient had office visit in the last 6 months or has a visit in the next 30 days with authorizing provider or within the authorizing provider's " "specialty.  See \"Patient Info\" tab in inbasket, or \"Choose Columns\" in Meds & Orders section of the refill encounter.           Passed - LDL on file in past 12 months    Recent Labs   Lab Test  07/05/17   0732   LDL  22            Passed - Microalbumin on file in past 12 months    Recent Labs   Lab Test  04/27/17   0745   MICROL  44   UMALCR  39.38*            Passed - Serum creatinine on file in past 12 months    Recent Labs   Lab Test  09/13/17   0701   CR  1.77*            Passed - Patient is age 18 or older          "

## 2018-04-23 RX ORDER — INSULIN GLARGINE 100 [IU]/ML
INJECTION, SOLUTION SUBCUTANEOUS
Qty: 15 ML | Refills: 0 | Status: SHIPPED | OUTPATIENT
Start: 2018-04-23 | End: 2018-06-05

## 2018-04-23 RX ORDER — ATORVASTATIN CALCIUM 40 MG/1
TABLET, FILM COATED ORAL
Qty: 90 TABLET | Refills: 0 | Status: SHIPPED | OUTPATIENT
Start: 2018-04-23 | End: 2018-06-05

## 2018-04-23 NOTE — TELEPHONE ENCOUNTER
Medication is being filled for 1 time refill only due to:  Patient needs to be seen because overdue for DM recheck.   Please call to schedule.  Alta Raines RN

## 2018-04-30 ENCOUNTER — ALLIED HEALTH/NURSE VISIT (OUTPATIENT)
Dept: CARDIOLOGY | Facility: CLINIC | Age: 63
End: 2018-04-30
Attending: INTERNAL MEDICINE
Payer: MEDICARE

## 2018-04-30 ENCOUNTER — OFFICE VISIT (OUTPATIENT)
Dept: ENDOCRINOLOGY | Facility: CLINIC | Age: 63
End: 2018-04-30
Payer: MEDICARE

## 2018-04-30 ENCOUNTER — PRE VISIT (OUTPATIENT)
Dept: CARDIOLOGY | Facility: CLINIC | Age: 63
End: 2018-04-30

## 2018-04-30 VITALS
HEIGHT: 65 IN | HEART RATE: 71 BPM | DIASTOLIC BLOOD PRESSURE: 74 MMHG | SYSTOLIC BLOOD PRESSURE: 116 MMHG | WEIGHT: 197.1 LBS | BODY MASS INDEX: 32.84 KG/M2

## 2018-04-30 VITALS
WEIGHT: 197 LBS | BODY MASS INDEX: 32.82 KG/M2 | HEART RATE: 71 BPM | DIASTOLIC BLOOD PRESSURE: 65 MMHG | HEIGHT: 65 IN | SYSTOLIC BLOOD PRESSURE: 105 MMHG | OXYGEN SATURATION: 96 %

## 2018-04-30 DIAGNOSIS — I50.22 CHRONIC SYSTOLIC HEART FAILURE (H): ICD-10-CM

## 2018-04-30 DIAGNOSIS — E87.5 HYPERKALEMIA: Primary | ICD-10-CM

## 2018-04-30 DIAGNOSIS — Z79.4 TYPE 2 DIABETES MELLITUS WITH HYPERGLYCEMIA, WITH LONG-TERM CURRENT USE OF INSULIN (H): Primary | ICD-10-CM

## 2018-04-30 DIAGNOSIS — I50.22 CHRONIC SYSTOLIC HEART FAILURE (H): Primary | ICD-10-CM

## 2018-04-30 DIAGNOSIS — I50.9 CONGESTIVE HEART FAILURE, UNSPECIFIED CONGESTIVE HEART FAILURE CHRONICITY, UNSPECIFIED CONGESTIVE HEART FAILURE TYPE: ICD-10-CM

## 2018-04-30 DIAGNOSIS — I50.22 CHRONIC SYSTOLIC HEART FAILURE (H): Primary | Chronic | ICD-10-CM

## 2018-04-30 DIAGNOSIS — E11.65 TYPE 2 DIABETES MELLITUS WITH HYPERGLYCEMIA, WITH LONG-TERM CURRENT USE OF INSULIN (H): Primary | ICD-10-CM

## 2018-04-30 DIAGNOSIS — E66.01 MORBID OBESITY (H): ICD-10-CM

## 2018-04-30 DIAGNOSIS — N18.30 CHRONIC KIDNEY DISEASE, STAGE 3 (MODERATE): ICD-10-CM

## 2018-04-30 LAB
ANION GAP SERPL CALCULATED.3IONS-SCNC: 9 MMOL/L (ref 3–14)
BUN SERPL-MCNC: 38 MG/DL (ref 7–30)
CALCIUM SERPL-MCNC: 8.8 MG/DL (ref 8.5–10.1)
CHLORIDE SERPL-SCNC: 100 MMOL/L (ref 94–109)
CO2 SERPL-SCNC: 24 MMOL/L (ref 20–32)
CREAT SERPL-MCNC: 1.87 MG/DL (ref 0.66–1.25)
ERYTHROCYTE [DISTWIDTH] IN BLOOD BY AUTOMATED COUNT: 13.2 % (ref 10–15)
GFR SERPL CREATININE-BSD FRML MDRD: 36 ML/MIN/1.7M2
GLUCOSE SERPL-MCNC: 319 MG/DL (ref 70–99)
HBA1C MFR BLD: 10.6 % (ref 4.3–6)
HCT VFR BLD AUTO: 41.3 % (ref 40–53)
HGB BLD-MCNC: 14.1 G/DL (ref 13.3–17.7)
MCH RBC QN AUTO: 32 PG (ref 26.5–33)
MCHC RBC AUTO-ENTMCNC: 34.1 G/DL (ref 31.5–36.5)
MCV RBC AUTO: 94 FL (ref 78–100)
NT-PROBNP SERPL-MCNC: 1136 PG/ML (ref 0–125)
PLATELET # BLD AUTO: 187 10E9/L (ref 150–450)
POTASSIUM SERPL-SCNC: 5.7 MMOL/L (ref 3.4–5.3)
RBC # BLD AUTO: 4.4 10E12/L (ref 4.4–5.9)
SODIUM SERPL-SCNC: 134 MMOL/L (ref 133–144)
WBC # BLD AUTO: 7.4 10E9/L (ref 4–11)

## 2018-04-30 PROCEDURE — 85027 COMPLETE CBC AUTOMATED: CPT | Performed by: INTERNAL MEDICINE

## 2018-04-30 PROCEDURE — 99215 OFFICE O/P EST HI 40 MIN: CPT | Mod: GC | Performed by: INTERNAL MEDICINE

## 2018-04-30 PROCEDURE — 93010 ELECTROCARDIOGRAM REPORT: CPT | Mod: ZP | Performed by: INTERNAL MEDICINE

## 2018-04-30 PROCEDURE — G0463 HOSPITAL OUTPT CLINIC VISIT: HCPCS | Mod: 25,ZF

## 2018-04-30 PROCEDURE — 93282 PRGRMG EVAL IMPLANTABLE DFB: CPT | Mod: ZF

## 2018-04-30 PROCEDURE — 36415 COLL VENOUS BLD VENIPUNCTURE: CPT | Performed by: INTERNAL MEDICINE

## 2018-04-30 PROCEDURE — 83880 ASSAY OF NATRIURETIC PEPTIDE: CPT | Performed by: INTERNAL MEDICINE

## 2018-04-30 PROCEDURE — T1013 SIGN LANG/ORAL INTERPRETER: HCPCS | Mod: U3,ZF

## 2018-04-30 PROCEDURE — 80048 BASIC METABOLIC PNL TOTAL CA: CPT | Performed by: INTERNAL MEDICINE

## 2018-04-30 PROCEDURE — 93282 PRGRMG EVAL IMPLANTABLE DFB: CPT | Mod: 26 | Performed by: INTERNAL MEDICINE

## 2018-04-30 RX ORDER — SODIUM POLYSTYRENE SULFONATE 30 G/120ML
30 ENEMA (ML) RECTAL ONCE
Qty: 120 ML | Refills: 0 | Status: SHIPPED | OUTPATIENT
Start: 2018-04-30 | End: 2018-04-30

## 2018-04-30 RX ORDER — GLIMEPIRIDE 4 MG/1
4 TABLET ORAL
Qty: 180 TABLET | Refills: 3 | Status: SHIPPED | OUTPATIENT
Start: 2018-04-30 | End: 2018-09-11

## 2018-04-30 ASSESSMENT — PAIN SCALES - GENERAL
PAINLEVEL: NO PAIN (0)
PAINLEVEL: NO PAIN (0)

## 2018-04-30 NOTE — MR AVS SNAPSHOT
After Visit Summary   4/30/2018    Lucian Oliveira    MRN: [id number]           Patient Information     Date Of Birth          1955        Visit Information        Provider Department      4/30/2018 10:15 AM Yue Dowd MD; [employer] [facility] End[facility]        Today's Diagnoses     Type 2 diabetes mellitus with hyperglycemia, with long-term current use of insulin (H)    -  1       Follow-ups after your visit        Your next 10 appointments already scheduled     Apr 30, 2018  2:30 PM CDT   Lab with [facility] LAB   [facility])    [address] [place] [id number] [phone]            Apr 30, 2018  2:45 PM CDT   Implanted Defibulator with  Cv Device 1   [facility])    [address] [place] [address]  [address] [place] [id number] [phone]            Apr 30, 2018  3:15 PM CDT   RETURN HEART FAILURE with Arvin Sheridan MD   [facility] ([facility])    [address] [place] [address]  [address] [place] [id number] [phone]            May 31, 2018 11:30 AM CDT   (Arrive by 11:15 AM)   Office Visit with Suyapa Dias RN   [facility] ([facility])    [address] [place] [id number] [phone]           Bring a current list of meds and any records pertaining to this visit. For Physicals, please bring immunization records and any forms needing to be filled out. Please arrive 10 minutes early to complete paperwork.            Aug 06, 2018  9:30 AM CDT   (Arrive by 9:15 AM)   RETURN DIABETES with Marisabel Prieto PA-C   [facility] Endocrinology ([facility])    [address] [place] [id number] [phone]              Future tests that were ordered for you today     Open Future Orders        Priority Expected Expires  "Ordered    Basic metabolic panel Routine 4/30/2018 5/10/2018 4/30/2018    N terminal pro BNP outpatient Routine 4/30/2018 4/30/2019 4/30/2018    CBC with platelets Routine 4/30/2018 4/30/2019 4/30/2018            Who to contact     Please call your clinic at [phone] to:    Ask questions about your health    Make or cancel appointments    Discuss your medicines    Learn about your test results    Speak to your doctor            Additional Information About Your Visit        [employer] Information     [employer] gives you secure access to your electronic health record. If you see a primary care provider, you can also send messages to your care team and make appointments. If you have questions, please call your primary care clinic.  If you do not have a primary care provider, please call [phone] and they will assist you.      [employer] is an electronic gateway that provides easy, online access to your medical records. With [employer], you can request a clinic appointment, read your test results, renew a prescription or communicate with your care team.     To access your existing account, please contact your [facility] or call [phone] for assistance.        Care EveryWhere ID     This is your Care EveryWhere ID. This could be used by other organizations to access your [place] medical records  [id number]        Your Vitals Were     Pulse Height BMI (Body Mass Index)             71 1.638 m (5' 4.5\") 33.31 kg/m2          Blood Pressure from Last 3 Encounters:   04/30/18 116/74   09/27/17 109/65   09/19/17 107/67    Weight from Last 3 Encounters:   04/30/18 89.4 kg (197 lb 1.6 oz)   10/13/17 88 kg (194 lb)   09/27/17 89.1 kg (196 lb 6.4 oz)              Today, you had the following     No orders found for display         Today's Medication Changes          These changes are accurate as of 4/30/18 11:38 AM.  If you have any questions, ask your nurse or doctor.               Start taking " these medicines.        Dose/Directions    glimepiride 4 MG tablet   Commonly known as:  AMARYL   Used for:  Type 2 diabetes mellitus with hyperglycemia, with long-term current use of insulin (H)   Started by:  Yue Dowd MD        Dose:  4 mg   Take 1 tablet (4 mg) by mouth every morning (before breakfast)   Quantity:  180 tablet   Refills:  3            Where to get your medicines      These medications were sent to [facility] [place]  [place], [place] - [address]  [address], [place] [address]     Phone:  [phone]     glimepiride 4 MG tablet                Primary Care Provider Office Phone # Fax #    Anna Archuleta PA-C [phone] [address] [place] [address]        Equal Access to Services     [facility]: Hadii rajani escamilla hadasho Soomaali, waaxda luqadaha, qaybta kaalmada adeegyada, joana graves . So [facility] [phone].    ATENCIÓN: Si habla español, tiene a lainez disposición servicios gratuitos de asistencia lingüística. [facility] [phone].    We comply with applicable federal civil rights laws and [place] laws. We do not discriminate on the basis of race, color, national origin, age, disability, sex, sexual orientation, or gender identity.            Thank you!     Thank you for choosing [facility] ENDOCRINOLOGY  for your care. Our goal is always to provide you with excellent care. Hearing back from our patients is one way we can continue to improve our services. Please take a few minutes to complete the written survey that you may receive in the mail after your visit with us. Thank you!             Your Updated Medication List - Protect others around you: Learn how to safely use, store and throw away your medicines at www.disposemymeds.org.          This list is accurate as of 4/30/18 11:38 AM.  Always use your most recent med list.                   Brand Name Dispense Instructions for use Diagnosis     aspirin 81 MG tablet     30 tablet    Take 1 tablet (81 mg) by mouth daily    Ischemic cardiomyopathy       atorvastatin 40 MG tablet    LIPITOR    90 tablet    TAKE ONE TABLET BY MOUTH EVERY DAY    Chronic systolic heart failure (H), Wheezing, CKD (chronic kidney disease) stage 3, GFR 30-59 ml/min, CHF (NYHA class II, ACC/AHA stage C) (H)       B-D U/F 31G X 5 MM   Generic drug:  insulin pen needle     100 each    USE 1 NEEDLE DAILY AS DIRECTED    Type 2 diabetes, HbA1C goal < 8% (H)       blood glucose monitoring meter device kit     1 kit    Use to test blood sugar 3-4 times daily, as directed.    Type 2 diabetes mellitus with diabetic nephropathy, with long-term current use of insulin (H)       carvedilol 25 MG tablet    COREG    180 tablet    Take 1 tablet (25 mg) by mouth 2 times daily (with meals)    Hypertension goal BP (blood pressure) < 140/90       clopidogrel 75 MG tablet    PLAVIX    90 tablet    Take 1 tablet (75 mg) by mouth daily    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 (H)       furosemide 40 MG tablet    LASIX    180 tablet    Take 1 tablet (40 mg) by mouth daily - Take an extra 40mg as needed if weight goes up 2 pounds overnight, or more than 5 pounds in 1 week.    CHF (NYHA class II, ACC/AHA stage C) (H)       glimepiride 4 MG tablet    AMARYL    180 tablet    Take 1 tablet (4 mg) by mouth every morning (before breakfast)    Type 2 diabetes mellitus with hyperglycemia, with long-term current use of insulin (H)       glipiZIDE 10 MG 24 hr tablet    glipiZIDE XL    90 tablet    Take 1 tablet (10 mg) by mouth daily    Type 2 diabetes mellitus with diabetic nephropathy, with long-term current use of insulin (H)       * insulin glargine 100 UNIT/ML injection    LANTUS SOLOSTAR    15 mL    Inject 35 Units Subcutaneous At Bedtime    Type 2 diabetes mellitus with diabetic nephropathy, with long-term current use of insulin (H)       * LANTUS  SOLOSTAR 100 UNIT/ML injection   Generic drug:  insulin glargine     15 mL    INJECT 40 UNITS UNDER THE SKIN AT BEDTIME    Type 2 diabetes mellitus with diabetic nephropathy, with long-term current use of insulin (H)       isosorbide mononitrate 30 MG 24 hr tablet    IMDUR    135 tablet    Take 1.5 tablets (45 mg) by mouth daily    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 (H)       * ONETOUCH ULTRA test strip   Generic drug:  blood glucose monitoring     100 each    USE TO TEST BLOOD SUGAR TWO TIMES A DAY OR AS DIRECTED    Type 2 diabetes mellitus with diabetic nephropathy, with long-term current use of insulin (H)       * blood glucose monitoring test strip    ONETOUCH ULTRA    100 each    Use to test blood sugar 3 times daily or as directed.    Type 2 diabetes mellitus with diabetic nephropathy, with long-term current use of insulin (H)       order for DME     1 Units    Auto CPAP 8-15 cmH20        sitagliptin 50 MG tablet    JANUVIA    90 tablet    Take 1 tablet (50 mg) by mouth daily    Type 2 diabetes mellitus with hyperglycemia, with long-term current use of insulin (H), Type 2 diabetes mellitus with stage 3 chronic kidney disease, with long-term current use of insulin (H)       spironolactone 25 MG tablet    ALDACTONE    135 tablet    Take 1.5 tablets (37.5 mg) by mouth daily    CHF (NYHA  class II, ACC/AHA stage C) (H)       * Notice:  This list has 4 medication(s) that are the same as other medications prescribed for you. Read the directions carefully, and ask your doctor or other care provider to review them with you.

## 2018-04-30 NOTE — PROGRESS NOTES
- Endocrinology follow up -    Reason for visit/consult:  DM2, CKD, Heart Failure with EF 15-20%    Primary care provider: Anna Archuleta    HPI: A 61 yo male here for his DM2, came with wife and inerpreter. He is an immigrant from [place] in .     Diagnosed DM2 at age 45, diagnosed by PMD by regular check up.Prescribed oral meds but not much taking and only with diet exercise. Insulin use was started around 1 year ago, lantus was started with 15 utnis and now 30 units. Insulin at night. Used to take Metformin and was discontinued due to GFR.     He has heart failure, and CABG 2008 with defibrillator. He usually has no issue to walk, doing exercise in the gym every other day, walks more than 30 mins.     (Interval history ) last seen me in 2017, Lantus 30 units, glipizide XL 10 mg daily and started januvia 50 mg.  According to the patient he is compliant to medications, increase Lantus 35 units.   He is taking glucose only in the morning noted during the day.   Of note he started to exercise in the gym, 1 hour exercise every other day.  He had a cardiology appointment today.     Glucometer forgot today. I summarized based on the patient recalls.  Am fastin, 138, 206    Current regimens:  GlipizideXL 10 mg daily for many years  Januvia 50 mg daily  Lantus 35 utnis at night    Life style:  Wake up 6am  Breakfast 8 am: coffee, beans egg, oatmeal, tortia: 45%  Lunch 1pm: squash, meat, vegi soup, soup, rice: 45%  Dinner 6pm: coffee, lina crackers, fruits: 10%  Bed time:       DM complications:  Retinopathy:   Nephropathy:   Neuropathy:   Most recent LDL:   LDL Cholesterol Calculated   Date Value Ref Range Status   2017 22 <100 mg/dL Final     Comment:     Desirable:       <100 mg/dl     A1C 10.6    2017 07:41 2017 07:19 2017 07:46 2017 07:32 2017 08:45   Hemoglobin A1C 10.7 (H) 9.8 (H) 10.5 (H) 9.1 (H) 9.8 (H)       Past Medical/Surgical History:  Past Medical  History:   Diagnosis Date     NO ACTIVE PROBLEMS      Past Surgical History:   Procedure Laterality Date     C CABG, ARTERY-VEIN, THREE  02/2008     IMPLANT AUTOMATIC IMPLANTABLE CARDIOVERTER DEFIBRILLATOR         Allergies:  Allergies   Allergen Reactions     No Known Drug Allergies        Current Medications   Current Outpatient Prescriptions   Medication     aspirin 81 MG tablet     atorvastatin (LIPITOR) 40 MG tablet     B-D U/F insulin pen needle     blood glucose monitoring (ACCU-CHEK FELIPE SMARTVIEW) meter device kit     blood glucose monitoring (ONE TOUCH ULTRA) test strip     carvedilol (COREG) 25 MG tablet     clopidogrel (PLAVIX) 75 MG tablet     furosemide (LASIX) 40 MG tablet     glipiZIDE (GLIPIZIDE XL) 10 MG 24 hr tablet     insulin glargine (LANTUS SOLOSTAR) 100 UNIT/ML injection     isosorbide mononitrate (IMDUR) 30 MG 24 hr tablet     LANTUS SOLOSTAR 100 UNIT/ML soln     losartan (COZAAR) 50 MG tablet     nitroglycerin (NITROSTAT) 0.4 MG SL tablet     ONE TOUCH ULTRA test strip     order for DME     sitagliptin (JANUVIA) 50 MG tablet     spironolactone (ALDACTONE) 25 MG tablet     No current facility-administered medications for this visit.        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 Otherwise a complete 14 point review of systems was taken and is negative unless stated in the history  "above.      Physical Exam:   Blood pressure 116/74, pulse 71, height 1.638 m (5' 4.5\"), weight 89.4 kg (197 lb 1.6 oz).    General: well appearing, no acute distress, pleasant and conversant,   Mental Status/neuro: alert and oriented  Face: symmetrical, normal facial color  Eyes: anicteric, PERRL, no proptosis or lid lag  Neck: suppler, no lymphadenopahty  Thyroid: normal size and texture, no nodule palpable, no bruits  Heart: regular rhythm, S1S2, no murmur appreciated  Lung: clear to auscultation bilaterally  Abdomen: soft, NT/ND, no hepatomegaly  Legs: no swelling or edema  Feet: no deformities or ulcers, 2+ DP pulses, normal monofilament sensation      Echo 6/5/2-17  Interpretation Summary  Moderate left ventricular dilation is present.  Severe diffuse hypokinesis is present.  The Ejection Fraction is estimated at 15-20%.  The inferior vena cava is normal.  No pericardial effusion is present.      Assessment and Plan  61 year old male with DM2, CKD, EF 15-20%    # DM2   A1c 10.6 not improving, also patient is compliant to medication.  I would modify the regimen today, and referred to Suyapa and Yoly.   Next option that I am considering is switch from Lantus to NovoLog 7030  Such as 25-30 units a.m., 15-20 units p.m. I would communicate with team.     -Continue Januvia 50 mg daily  -Increase Lantus from 35 to 40 utnis QHS  -Switch from glipizide 10 mg daily to glimepiride 4 mg twice daily  -Encouraged to check glucose at home more frequently at least morning in the bedtime.   - Exercise to continue    -Referral to Suyapa in 1 month, and Yoly in 3 months.     - RTC with me in 2-3 month with brief schedule    I spent 35 minutes with this patient face to face and explained the conditions and plans (more than 50% of time was counseling/coordination of care, DM management plan and follow up plan, importance to check glucose level at home, medication compliance, explained the patient for the follow-up plan) . The " patient understood and is satisfied with today's visit. Return to clinic with me in 7 months.         Yue Dowd MD  Staff Physician  Endocrinology and Metabolism  License: [id number]

## 2018-04-30 NOTE — MR AVS SNAPSHOT
After Visit Summary   4/30/2018    Lucian Oliveira    MRN: [id number]           Patient Information     Date Of Birth          1955        Visit Information        Provider Department      4/30/2018 3:15 PM Alyson Pickard; Arvin Sheridan MD [facility]        Today's Diagnoses     Hyperkalemia    -  1    Chronic systolic heart failure (H)          Care Instructions    You were seen today in the Cardiovascular Clinic at the [facility].      Cardiology Providers you saw during your visit: Dr. Sheridan       Medication Changes:   Hold Spironolactone  Kayexalate 30 grams x 1 dose      Patient Instructions:  EKG to be completed in clinic today  Repeat labs on Friday  Schedule a cardiopulmonary function test to be done in 1-2.   Please call with any questions or concerns [phone] AURORA Lambert      Follow up Appointment Information:  With Dr. Sheridan in 6 months.      Results:  Results for LUCIAN OLIVEIRA (MRN [id number]) as of 4/30/2018 15:09   Ref. Range 4/30/2018 11:48   Sodium Latest Ref Range: 133 - 144 mmol/L 134   Potassium Latest Ref Range: 3.4 - 5.3 mmol/L 5.7 (H)   Chloride Latest Ref Range: 94 - 109 mmol/L 100   Carbon Dioxide Latest Ref Range: 20 - 32 mmol/L 24   Urea Nitrogen Latest Ref Range: 7 - 30 mg/dL 38 (H)   Creatinine Latest Ref Range: 0.66 - 1.25 mg/dL 1.87 (H)   GFR Estimate Latest Ref Range: >60 mL/min/1.7m2 36 (L)   GFR Estimate If Black Latest Ref Range: >60 mL/min/1.7m2 44 (L)   Calcium Latest Ref Range: 8.5 - 10.1 mg/dL 8.8   Anion Gap Latest Ref Range: 3 - 14 mmol/L 9   N-Terminal Pro Bnp Latest Ref Range: 0 - 125 pg/mL 1136 (H)   Glucose Latest Ref Range: 70 - 99 mg/dL 319 (H)   WBC Latest Ref Range: 4.0 - 11.0 10e9/L 7.4   Hemoglobin Latest Ref Range: 13.3 - 17.7 g/dL 14.1   Hematocrit Latest Ref Range: 40.0 - 53.0 % 41.3   Platelet Count Latest Ref Range: 150 - 450 10e9/L 187   RBC Count Latest Ref Range: 4.4 - 5.9 10e12/L 4.40  "  MCV Latest Ref Range: 78 - 100 fl 94   MCH Latest Ref Range: 26.5 - 33.0 pg 32.0   MCHC Latest Ref Range: 31.5 - 36.5 g/dL 34.1   RDW Latest Ref Range: 10.0 - 15.0 % 13.2        [address] [place] [address] on [address]rd [place] [address]       Thank you for allowing us to be a part of your care here at the [facility]     If you have questions or concerns please contact us at:     Petra Hall RN BSN   Cardiology Care Coordinator   [facility]  Questions and [phone]  First press #1 for the University and then press #3 for \"Medical Advise\" to reach a Cardiology Nurse.                                                                                ** Please note that you will NOT receive a reminder call regarding your scheduled testing, reminder calls are for provider appointments only.  If you are scheduled for testing within the [employer] system you may receive a call regarding pre-registration for billing purposes only.**               Follow-ups after your visit        Your next 10 appointments already scheduled     May 02, 2018  8:30 AM CDT   Card Cardpul Stress Tst Adult with UUEKGS   [facility] ([facility]    [address] [facility] [address]               May 04, 2018  7:45 AM CDT   LAB with [facility] LAB   [facility] ([facility])    [address] [place] [id number] [phone]           Please do not eat 10-12 hours before your appointment if you are coming in fasting for labs on lipids, cholesterol, or glucose (sugar). This does not apply to pregnant women. Water, hot tea and black coffee (with nothing added) are okay. Do not drink other fluids, diet soda or chew gum.            May 31, 2018 11:30 AM CDT   (Arrive by 11:15 AM)   Office Visit with Suyapa Dias RN   [facility] ([facility])    [address] [place] [address] Se  3rd " Floor  [facility] [id number] [phone]           Bring a current list of meds and any records pertaining to this visit. For Physicals, please bring immunization records and any forms needing to be filled out. Please arrive 10 minutes early to complete paperwork.            Aug 06, 2018  9:30 AM CDT   (Arrive by 9:15 AM)   RETURN DIABETES with Marisabel Prieto PA-C   [facility]ocrinology ([facility])    [address] [place] [address]  3rd Floor  [facility] [id number] [phone]            Oct 22, 2018  2:00 PM CDT   Lab with UC LAB   [facility] Lab ([facility])    [address] [facility]  1st Floor  [facility] [id number] [phone]            Oct 22, 2018  2:30 PM CDT   (Arrive by 2:15 PM)   Implanted Defibulator with Uc Cv Device 1   [facility] ([facility])    [address] [place] [address]  [address]  [facility] [id number] [phone]            Oct 22, 2018  3:00 PM CDT   (Arrive by 2:45 PM)   RETURN HEART FAILURE with Arvin Sheridan MD   [facility] ([facility])    [address] [place] [address]  [address] [place] [id number] [phone]              Future tests that were ordered for you today     Open Future Orders        Priority Expected Expires Ordered    Basic metabolic panel Routine 5/3/2018 5/10/2018 4/30/2018    Card Cardiopulmonary stress test - adult Routine  6/14/2018 4/30/2018    EKG 12-lead, tracing only (Same Day) Routine 4/30/2018 5/7/2018 4/30/2018    Follow-Up with Device Clinic-6 months Routine 10/27/2018 1/25/2019 4/30/2018            Who to contact     If you have questions or need follow up information about today's clinic visit or your schedule please contact [facility] directly at [phone].  Normal or non-critical lab and imaging results will be communicated to you by MyChart, letter or phone within 4 business days after the clinic has received  "the results. If you do not hear from us within 7 days, please contact the clinic through [employer] or phone. If you have a critical or abnormal lab result, we will notify you by phone as soon as possible.  Submit refill requests through [employer] or call your pharmacy and they will forward the refill request to us. Please allow 3 business days for your refill to be completed.          Additional Information About Your Visit        [employer] Information     [employer] gives you secure access to your electronic health record. If you see a primary care provider, you can also send messages to your care team and make appointments. If you have questions, please call your primary care clinic.  If you do not have a primary care provider, please call [phone] and they will assist you.        Care EveryWhere ID     This is your Care EveryWhere ID. This could be used by other organizations to access your [facility] medical records  [id number]        Your Vitals Were     Pulse Height Pulse Oximetry BMI (Body Mass Index)          71 1.651 m (5' 5\") 96% 32.78 kg/m2         Blood Pressure from Last 3 Encounters:   04/30/18 105/65   04/30/18 116/74   09/27/17 109/65    Weight from Last 3 Encounters:   04/30/18 89.4 kg (197 lb)   04/30/18 89.4 kg (197 lb 1.6 oz)   10/13/17 88 kg (194 lb)                 Today's Medication Changes          These changes are accurate as of 4/30/18  4:59 PM.  If you have any questions, ask your nurse or doctor.               Start taking these medicines.        Dose/Directions    glimepiride 4 MG tablet   Commonly known as:  AMARYL   Used for:  Type 2 diabetes mellitus with hyperglycemia, with long-term current use of insulin (H)   Started by:  Yue Dowd MD        Dose:  4 mg   Take 1 tablet (4 mg) by mouth every morning (before breakfast)   Quantity:  180 tablet   Refills:  3       sodium polystyrene 0.25 GM/ML suspension   Commonly known as:  KAYEXALATE   Used for:  Hyperkalemia   Started by:  " Arvin Sheridan MD        Dose:  30 g   Take 120 mLs (30 g) by mouth once for 1 dose Avoid taking other oral medications 3 hours before or after this medication.   Quantity:  120 mL   Refills:  0         Stop taking these medicines if you haven't already. Please contact your care team if you have questions.     spironolactone 25 MG tablet   Commonly known as:  ALDACTONE   Stopped by:  Arvin Sheridan MD                Where to get your medicines      These medications were sent to [facility] - [address]  [address] [facility] [address], [place] [address]     Phone:  [phone]     glimepiride 4 MG tablet         Some of these will need a paper prescription and others can be bought over the counter.  Ask your nurse if you have questions.     Bring a paper prescription for each of these medications     sodium polystyrene 0.25 GM/ML suspension                Primary Care Provider Office Phone # Fax #    Anna Archuleta PA-C [phone]       [address] [facility] [place] [address]        Equal Access to Services     [facility]: Hadii aad ku hadasho Soomaali, waaxda luqadaha, qaybta kaalmada burt, joaan graves . So [facility] [phone].    ATENCIÓN: Si habla español, tiene a lainez disposición servicios gratuitos de asistencia lingüística. [facility] [phone].    We comply with applicable federal civil rights laws and [place] laws. We do not discriminate on the basis of race, color, national origin, age, disability, sex, sexual orientation, or gender identity.            Thank you!     Thank you for choosing [facility]  for your care. Our goal is always to provide you with excellent care. Hearing back from our patients is one way we can continue to improve our services. Please take a few minutes to complete the written survey that you may receive in the mail after your visit with us. Thank you!              Your Updated Medication List - Protect others around you: Learn how to safely use, store and throw away your medicines at www.disposemymeds.org.          This list is accurate as of 4/30/18  4:59 PM.  Always use your most recent med list.                   Brand Name Dispense Instructions for use Diagnosis    aspirin 81 MG tablet     30 tablet    Take 1 tablet (81 mg) by mouth daily    Ischemic cardiomyopathy       atorvastatin 40 MG tablet    LIPITOR    90 tablet    TAKE ONE TABLET BY MOUTH EVERY DAY    Chronic systolic heart failure (H), Wheezing, CKD (chronic kidney disease) stage 3, GFR 30-59 ml/min, CHF (NYHA class II, ACC/AHA stage C) (H)       B-D U/F 31G X 5 MM   Generic drug:  insulin pen needle     100 each    USE 1 NEEDLE DAILY AS DIRECTED    Type 2 diabetes, HbA1C goal < 8% (H)       blood glucose monitoring meter device kit     1 kit    Use to test blood sugar 3-4 times daily, as directed.    Type 2 diabetes mellitus with diabetic nephropathy, with long-term current use of insulin (H)       carvedilol 25 MG tablet    COREG    180 tablet    Take 1 tablet (25 mg) by mouth 2 times daily (with meals)    Hypertension goal BP (blood pressure) < 140/90       clopidogrel 75 MG tablet    PLAVIX    90 tablet    Take 1 tablet (75 mg) by mouth daily    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 (H)       furosemide 40 MG tablet    LASIX    180 tablet    Take 1 tablet (40 mg) by mouth daily - Take an extra 40mg as needed if weight goes up 2 pounds overnight, or more than 5 pounds in 1 week.    CHF (NYHA class II, ACC/AHA stage C) (H)       glimepiride 4 MG tablet    AMARYL    180 tablet    Take 1 tablet (4 mg) by mouth every morning (before breakfast)    Type 2 diabetes mellitus with hyperglycemia, with long-term current use of insulin (H)       glipiZIDE 10 MG 24 hr tablet    glipiZIDE XL    90 tablet    Take 1 tablet (10 mg) by mouth daily    Type 2  diabetes mellitus with diabetic nephropathy, with long-term current use of insulin (H)       * insulin glargine 100 UNIT/ML injection    LANTUS SOLOSTAR    15 mL    Inject 35 Units Subcutaneous At Bedtime    Type 2 diabetes mellitus with diabetic nephropathy, with long-term current use of insulin (H)       * LANTUS SOLOSTAR 100 UNIT/ML injection   Generic drug:  insulin glargine     15 mL    INJECT 40 UNITS UNDER THE SKIN AT BEDTIME    Type 2 diabetes mellitus with diabetic nephropathy, with long-term current use of insulin (H)       isosorbide mononitrate 30 MG 24 hr tablet    IMDUR    135 tablet    Take 1.5 tablets (45 mg) by mouth daily    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 (H)       * ONETOUCH ULTRA test strip   Generic drug:  blood glucose monitoring     100 each    USE TO TEST BLOOD SUGAR TWO TIMES A DAY OR AS DIRECTED    Type 2 diabetes mellitus with diabetic nephropathy, with long-term current use of insulin (H)       * blood glucose monitoring test strip    ONETOUCH ULTRA    100 each    Use to test blood sugar 3 times daily or as directed.    Type 2 diabetes mellitus with diabetic nephropathy, with long-term current use of insulin (H)       order for DME     1 Units    Auto CPAP 8-15 cmH20        sitagliptin 50 MG tablet    JANUVIA    90 tablet    Take 1 tablet (50 mg) by mouth  daily    Type 2 diabetes mellitus with hyperglycemia, with long-term current use of insulin (H), Type 2 diabetes mellitus with stage 3 chronic kidney disease, with long-term current use of insulin (H)       sodium polystyrene 0.25 GM/ML suspension    KAYEXALATE    120 mL    Take 120 mLs (30 g) by mouth once for 1 dose Avoid taking other oral medications 3 hours before or after this medication.    Hyperkalemia       * Notice:  This list has 4 medication(s) that are the same as other medications prescribed for you. Read the directions carefully, and ask your doctor or other care provider to review them with you.

## 2018-04-30 NOTE — PROGRESS NOTES
"2018     Anna Archuleta PA-C    [facility]    [address] [place]  [address]       RE: Lucian Oliveira   MRN: [id number]   : 1955      Dear Ms. Archuleta:      We had the pleasure of seeing Mr. Lucian Oliveira for followup in our Advanced Heart Failure Clinic.  As you know, he is a 62-year-old gentleman with ischemic cardiomyopathy and severe LV systolic dysfunction who is currently on optimal guideline-directed medical therapy.  He returns today for follow-up.        Mr. Oliveira states that he is doing well. He denies lightheadedness, chest pain, or shortness of breath. He is able to walk five to six blocks before he needs to stop. He feels like this is more than he was able to do previously. When he stops it is usually due to leg pain, heavy breathing. He walks on a treadmill every other day. He denies PND, orthopnea. Remains consistent with his CPAP. Denies any device shocks.    REVIEW OF SYSTEMS:  A detailed 10-point review of systems was obtained as described in the History of Present Illness.  All other systems were reviewed and are negative.      CURRENT MEDICATIONS:   1.  Aspirin 81 mg daily.   2.  Lipitor 40 mg daily.   3.  Coreg 25 mg twice daily.   4.  Cozaar 50 mg once daily.   5.  Aldactone 12.5 mg daily.   6.  Isosorbide mononitrate 30 mg daily.   7.  Lantus insulin 28 units at bedtime.   8.  Glipizide XL 10 mg once a day.   9.  Lasix 40 mg daily.      PHYSICAL EXAMINATION:    /65  Pulse 71  Ht 1.651 m (5' 5\")  Wt 89.4 kg (197 lb)  SpO2 96%  BMI 32.78 kg/m2  He had no pallor, cyanosis or jaundice.  His neck exam revealed no JVD, thyromegaly or carotid bruit.  Carotids were 1+ bilaterally.  He had trace bilateral lower extremity edema.  His extremities were warm and well perfused.  Cardiac auscultation revealed normal S1 and S2 with no murmur, rub or gallop.  Auscultation of the lungs revealed equal air entry on both sides with no added sounds.  His " abdomen was soft with normal bowel sounds, no tenderness, no rigidity, no guarding.      ECHO:  He has severe LV systolic dysfunction with regional wall motion abnormality.  His EF was 25%.  He had anteroseptal and lateral wall akinesia.  His left ventricular end-diastolic diameter was 5.6 cm.  He has normal right ventricular size and systolic function.  He had no significant atrial enlargement, mitral regurgitation or tricuspid regurgitation.  He had no pericardial effusion.      CPX:  Walked for 8 minutes and 16 seconds.  He achieved anaerobic threshold with an RER of 1.13.  His VO2 was 11.4 mL/kg/minute.  He had no significant increase in blood pressure with exercise.  His VE/VCO2 slope was 36.  When compared to his last cardiopulmonary exercise testing, there was no significant change.  His VO2 is still low at 11.4.     Lab Results   Component Value Date    WBC 7.4 04/30/2018    HGB 14.1 04/30/2018    HCT 41.3 04/30/2018     04/30/2018     04/30/2018    POTASSIUM 5.7 (H) 04/30/2018    CHLORIDE 100 04/30/2018    CO2 24 04/30/2018    BUN 38 (H) 04/30/2018    CR 1.87 (H) 04/30/2018     (H) 04/30/2018    NTBNPI 8936 (H) 06/26/2015    NTBNP 1136 (H) 04/30/2018    AST 17 07/05/2017    ALT 28 07/05/2017    ALKPHOS 113 07/05/2017    BILITOTAL 0.6 07/05/2017    INR 1.01 04/02/2015          IMPRESSION:    Mr. Lucian Oliveira is a 61-year-old with ischemic cardiomyopathy and severe LV systolic dysfunction who is currently on optimal guideline-directed medical therapy.      He is currently functional class II.  He is not significantly volume overloaded.  He has no evidence of end-organ hypoperfusion. He continues to remain stable from a heart failure standpoint despite his VO2 on his cardiopulmonary stress test. He continues to be active. He is blood type O.  He is on the heavier side.  He would have a long wait time on a cardiac transplant list.  Furthermore, it is difficult for someone of his blood type  and size to get a heart transplant as a status 2.  He is not inotrope dependent now. Although it is low, his VO2 has been stable for the last 2 years. Finally, his 1-year mortality based on SHFM is ~ 8% ( less than transplant or VAD).  In view of this, we will continue to monitor him on medical therapy for now with close monitoring.    If he were to deteriorate with worsening end-organ perfusional or functional capacity, then he would likely need an LVAD as a bridge to transplant. This would also give us time to control his diabetes. This remains uncontrolled with an elevated blood sugar and hemoglobin A1c. We will continue to encourage adequate control of this.    He can return to clinic in six months. Mr. Oliveira was discussed with Dr. Sheridan.    Michele Gan MD  Advanced Heart Failure/Heart Transplant Fellow  [facility] Division of Cardiology   [phone]    I have personally seen and examined the patient, and then discussed with Dr. Gan, and agree with the findings and plan in this note. I have reviewed today's vital signs, medications, labs, and imaging.     Sincerely,    Arvin Sheridan MD   [facility] for [facility]monary Hypertension  Section of Advanced Heart Failure   Cardiovascular Division  [facility]   [phone]

## 2018-04-30 NOTE — PROGRESS NOTES
Preliminary Device Interrogation Results.  Final physician signed paceart report to be scanned and attached.    Patient seen in clinic for evaluation and iterative programming of his [employer] single lead ICD per MD orders.  Patient has an appointment to see Dr. Sheridan today.  Normal ICD function.  10 NSVT episodes recorded since last remote 9/6/2017.   2 EGM's available,  1 reveals  5 beats VT , 128 bpm.; 2nd EGM reveals a supra tachy rhythm @ 153 bpm.  Intrinsic rhythm = VS @ 72 bpm.   = <1%.    Estimated battery longevity to ELOISA = 11 years.  RV lead impedance trends appear stable.  Patient reports that he is feeling well and denies complaints.  Plan for patient to send a remote transmission in 3 months and return to clinic in 6 months.    single lead ICD

## 2018-04-30 NOTE — LETTER
"2018      RE: Lucian Oliveira  [address] [address]       Dear Colleague,    Thank you for the opportunity to participate in the care of your patient, Lucian Oliveira, at the [facility] HEART [facility] at [facility]. Please see a copy of my visit note below.    2018     Anna Archuleta PA-C    [facility]    [address] [address] [place]  [address]       RE: Lucian Oliveira   MRN: [id number]   : 1955      Dear Ms. Kathe:      We had the pleasure of seeing Mr. Lucian Oliveira for followup in our Advanced Heart Failure Clinic.  As you know, he is a 62-year-old gentleman with ischemic cardiomyopathy and severe LV systolic dysfunction who is currently on optimal guideline-directed medical therapy.  He returns today for follow-up.        Mr. Oliveira states that he is doing well. He denies lightheadedness, chest pain, or shortness of breath. He is able to walk five to six blocks before he needs to stop. He feels like this is more than he was able to do previously. When he stops it is usually due to leg pain, heavy breathing. He walks on a treadmill every other day. He denies PND, orthopnea. Remains consistent with his CPAP. Denies any device shocks.    REVIEW OF SYSTEMS:  A detailed 10-point review of systems was obtained as described in the History of Present Illness.  All other systems were reviewed and are negative.      CURRENT MEDICATIONS:   1.  Aspirin 81 mg daily.   2.  Lipitor 40 mg daily.   3.  Coreg 25 mg twice daily.   4.  Cozaar 50 mg once daily.   5.  Aldactone 12.5 mg daily.   6.  Isosorbide mononitrate 30 mg daily.   7.  Lantus insulin 28 units at bedtime.   8.  Glipizide XL 10 mg once a day.   9.  Lasix 40 mg daily.      PHYSICAL EXAMINATION:    /65  Pulse 71  Ht 1.651 m (5' 5\")  Wt 89.4 kg (197 lb)  SpO2 96%  BMI 32.78 kg/m2  He had no pallor, cyanosis or jaundice.  His neck exam revealed no JVD, " thyromegaly or carotid bruit.  Carotids were 1+ bilaterally.  He had trace bilateral lower extremity edema.  His extremities were warm and well perfused.  Cardiac auscultation revealed normal S1 and S2 with no murmur, rub or gallop.  Auscultation of the lungs revealed equal air entry on both sides with no added sounds.  His abdomen was soft with normal bowel sounds, no tenderness, no rigidity, no guarding.      ECHO:  He has severe LV systolic dysfunction with regional wall motion abnormality.  His EF was 25%.  He had anteroseptal and lateral wall akinesia.  His left ventricular end-diastolic diameter was 5.6 cm.  He has normal right ventricular size and systolic function.  He had no significant atrial enlargement, mitral regurgitation or tricuspid regurgitation.  He had no pericardial effusion.      CPX:  Walked for 8 minutes and 16 seconds.  He achieved anaerobic threshold with an RER of 1.13.  His VO2 was 11.4 mL/kg/minute.  He had no significant increase in blood pressure with exercise.  His VE/VCO2 slope was 36.  When compared to his last cardiopulmonary exercise testing, there was no significant change.  His VO2 is still low at 11.4.     Lab Results   Component Value Date    WBC 7.4 04/30/2018    HGB 14.1 04/30/2018    HCT 41.3 04/30/2018     04/30/2018     04/30/2018    POTASSIUM 5.7 (H) 04/30/2018    CHLORIDE 100 04/30/2018    CO2 24 04/30/2018    BUN 38 (H) 04/30/2018    CR 1.87 (H) 04/30/2018     (H) 04/30/2018    NTBNPI 8936 (H) 06/26/2015    NTBNP 1136 (H) 04/30/2018    AST 17 07/05/2017    ALT 28 07/05/2017    ALKPHOS 113 07/05/2017    BILITOTAL 0.6 07/05/2017    INR 1.01 04/02/2015     IMPRESSION:    Mr. Lucian Oliveira is a 61-year-old with ischemic cardiomyopathy and severe LV systolic dysfunction who is currently on optimal guideline-directed medical therapy.      He is currently functional class II.  He is not significantly volume overloaded.  He has no evidence of end-organ  hypoperfusion. He continues to remain stable from a heart failure standpoint despite his VO2 on his cardiopulmonary stress test. He continues to be active. He is blood type O.  He is on the heavier side.  He would have a long wait time on a cardiac transplant list.  Furthermore, it is difficult for someone of his blood type and size to get a heart transplant as a status 2.  He is not inotrope dependent now. Although it is low, his VO2 has been stable for the last 2 years. Finally, his 1-year mortality based on SHFM is ~ 8% ( less than transplant or VAD).  In view of this, we will continue to monitor him on medical therapy for now with close monitoring.    If he were to deteriorate with worsening end-organ perfusional or functional capacity, then he would likely need an LVAD as a bridge to transplant. This would also give us time to control his diabetes. This remains uncontrolled with an elevated blood sugar and hemoglobin A1c. We will continue to encourage adequate control of this.    He can return to clinic in six months. Mr. Oliveira was discussed with Dr. Sheridan.    Michele Gan MD  Advanced Heart Failure/Heart Transplant Fellow  [facility] Division of Cardiology   [phone]    I have personally seen and examined the patient, and then discussed with Dr. Gan, and agree with the findings and plan in this note. I have reviewed today's vital signs, medications, labs, and imaging.     Sincerely,  Arvin Sheridan MD   Center for Pulmonary Hypertension  Section of Advanced Heart Failure   Cardiovascular Division  [facility]   [phone]

## 2018-04-30 NOTE — LETTER
2018       RE: Lucian Oliveira  [address] [facility] [address] [address] [address]     Dear Colleague,    Thank you for referring your patient, Lucian Oliveira, to the [facility] ENDOCRINOLOGY at [facility]. Please see a copy of my visit note below.                   - Endocrinology follow up -    Reason for visit/consult:  DM2, CKD, Heart Failure with EF 15-20%    Primary care provider: Anna Archuleta    HPI: A 61 yo male here for his DM2, came with wife and inerpreter. He is an immigrant from [place] in .     Diagnosed DM2 at age 45, diagnosed by PMD by regular check up.Prescribed oral meds but not much taking and only with diet exercise. Insulin use was started around 1 year ago, lantus was started with 15 utnis and now 30 units. Insulin at night. Used to take Metformin and was discontinued due to GFR.     He has heart failure, and CABG 2008 with defibrillator. He usually has no issue to walk, doing exercise in the gym every other day, walks more than 30 mins.     (Interval history ) last seen me in 2017, Lantus 30 units, glipizide XL 10 mg daily and started januvia 50 mg.  According to the patient he is compliant to medications, increase Lantus 35 units.   He is taking glucose only in the morning noted during the day.   Of note he started to exercise in the gym, 1 hour exercise every other day.  He had a cardiology appointment today.     Glucometer forgot today. I summarized based on the patient recalls.  Am fastin, 138, 206    Current regimens:  GlipizideXL 10 mg daily for many years  Januvia 50 mg daily  Lantus 35 utnis at night    Life style:  Wake up 6am  Breakfast 8 am: coffee, beans egg, oatmeal, tortia: 45%  Lunch 1pm: squash, meat, vegi soup, soup, rice: 45%  Dinner 6pm: coffee, lina crackers, fruits: 10%  Bed time:       DM complications:  Retinopathy:   Nephropathy:   Neuropathy:   Most recent LDL:   LDL Cholesterol Calculated   Date Value Ref Range  Status   07/05/2017 22 <100 mg/dL Final     Comment:     Desirable:       <100 mg/dl     A1C 10.6    1/5/2017 07:41 2/7/2017 07:19 4/27/2017 07:46 7/5/2017 07:32 9/6/2017 08:45   Hemoglobin A1C 10.7 (H) 9.8 (H) 10.5 (H) 9.1 (H) 9.8 (H)       Past Medical/Surgical History:  Past Medical History:   Diagnosis Date     NO ACTIVE PROBLEMS      Past Surgical History:   Procedure Laterality Date     C CABG, ARTERY-VEIN, THREE  02/2008     IMPLANT AUTOMATIC IMPLANTABLE CARDIOVERTER DEFIBRILLATOR         Allergies:  Allergies   Allergen Reactions     No Known Drug Allergies        Current Medications   Current Outpatient Prescriptions   Medication     aspirin 81 MG tablet     atorvastatin (LIPITOR) 40 MG tablet     B-D U/F insulin pen needle     blood glucose monitoring (ACCU-CHEK FELIPE SMARTVIEW) meter device kit     blood glucose monitoring (ONE TOUCH ULTRA) test strip     carvedilol (COREG) 25 MG tablet     clopidogrel (PLAVIX) 75 MG tablet     furosemide (LASIX) 40 MG tablet     glipiZIDE (GLIPIZIDE XL) 10 MG 24 hr tablet     insulin glargine (LANTUS SOLOSTAR) 100 UNIT/ML injection     isosorbide mononitrate (IMDUR) 30 MG 24 hr tablet     LANTUS SOLOSTAR 100 UNIT/ML soln     losartan (COZAAR) 50 MG tablet     nitroglycerin (NITROSTAT) 0.4 MG SL tablet     ONE TOUCH ULTRA test strip     order for DME     sitagliptin (JANUVIA) 50 MG tablet     spironolactone (ALDACTONE) 25 MG tablet     No current facility-administered medications for this visit.        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 Otherwise a complete 14 point review of systems was taken and is negative unless stated in the history above.      Physical Exam:   Blood pressure 116/74, pulse 71, height 1.638 m (5' 4.5\"), weight 89.4 kg (197 lb 1.6 oz).    General: well appearing, no acute distress, pleasant and conversant,   Mental Status/neuro: alert and oriented  Face: symmetrical, normal facial color  Eyes: anicteric, PERRL, no proptosis or lid lag  Neck: suppler, no lymphadenopahty  Thyroid: normal size and texture, no nodule palpable, no bruits  Heart: regular rhythm, S1S2, no murmur appreciated  Lung: clear to auscultation bilaterally  Abdomen: soft, NT/ND, no hepatomegaly  Legs: no swelling or edema  Feet: no deformities or ulcers, 2+ DP pulses, normal monofilament sensation      Echo 6/5/2-17  Interpretation Summary  Moderate left ventricular dilation is present.  Severe diffuse hypokinesis is present.  The Ejection Fraction is estimated at 15-20%.  The inferior vena cava is normal.  No pericardial effusion is present.      Assessment and Plan  61 year old male with DM2, CKD, EF 15-20%    # DM2   A1c 10.6 not improving, also patient is compliant to medication.  I would modify the regimen today, and referred to Suyapa and Yoly.   Next option that I am considering is switch from Lantus to NovoLog 7030  Such as 25-30 units a.m., 15-20 units p.m. I would communicate with team.     -Continue Januvia 50 mg daily  -Increase Lantus from 35 to 40 utnis QHS  -Switch from glipizide 10 mg daily to glimepiride 4 mg twice daily  -Encouraged to check glucose at home more frequently at least morning in the bedtime.   - Exercise to continue    -Referral to Suyapa in 1 month, and Yoly in 3 months.     - RTC with me in 2-3 month with brief schedule    I spent 35 " minutes with this patient face to face and explained the conditions and plans (more than 50% of time was counseling/coordination of care, DM management plan and follow up plan, importance to check glucose level at home, medication compliance, explained the patient for the follow-up plan) . The patient understood and is satisfied with today's visit. Return to clinic with me in 7 months.         Yue Dowd MD  Staff Physician  Endocrinology and Metabolism  License: [id number]

## 2018-04-30 NOTE — MR AVS SNAPSHOT
After Visit Summary   4/30/2018    Lucian Oliveira    MRN: [id number]           Patient Information     Date Of Birth          1955        Visit Information        Provider Department      4/30/2018 2:45 PM Alyson Pickard; 1,  Cv Device [facility]        Today's Diagnoses     Chronic systolic heart failure (H)    -  1      Care Instructions    It was a pleasure to see you in clinic today. Please do not hesitate to call with any questions or concerns. You are scheduled for a remote 3 transmission on 7/30/2018. This will happen automatically in the night. We will call in 1-2 business days to discuss the results with you. We look forward to seeing you in clinic at your next device check in 6 months.    Cora Santa RN  Electrophysiology Nurse Clinician  [facility]  During business hours call:  [phone]  After business hours please call: [phone]- select option #4 and ask for job code [phone].            Follow-ups after your visit        Additional Services     Follow-Up with Device Clinic-6 months                 Your next 10 appointments already scheduled     May 31, 2018 11:30 AM CDT   (Arrive by 11:15 AM)   Office Visit with Suyapa Dias RN   [facility] Diabetes ([facility])    [address] [place] [id number] [phone]           Bring a current list of meds and any records pertaining to this visit. For Physicals, please bring immunization records and any forms needing to be filled out. Please arrive 10 minutes early to complete paperwork.            Aug 06, 2018  9:30 AM CDT   (Arrive by 9:15 AM)   RETURN DIABETES with Marisabel Prieto PA-C   [facility] Endocrinology ([facility])    [address] [place] [address] [phone]              Future tests that were ordered for you today     Open Future Orders        Priority  Expected Expires Ordered    Follow-Up with Device Clinic-6 months Routine 10/27/2018 1/25/2019 4/30/2018            Who to contact     If you have questions or need follow up information about today's clinic visit or your schedule please contact [facility] directly at [phone].  Normal or non-critical lab and imaging results will be communicated to you by MyChart, letter or phone within 4 business days after the clinic has received the results. If you do not hear from us within 7 days, please contact the clinic through [employer] or phone. If you have a critical or abnormal lab result, we will notify you by phone as soon as possible.  Submit refill requests through [employer] or call your pharmacy and they will forward the refill request to us. Please allow 3 business days for your refill to be completed.          Additional Information About Your Visit        MyChart Information     [employer] gives you secure access to your electronic health record. If you see a primary care provider, you can also send messages to your care team and make appointments. If you have questions, please call your primary care clinic.  If you do not have a primary care provider, please call [phone] and they will assist you.        Care EveryWhere ID     This is your Care EveryWhere ID. This could be used by other organizations to access your [facility] medical records  [id number]         Blood Pressure from Last 3 Encounters:   04/30/18 105/65   04/30/18 116/74   09/27/17 109/65    Weight from Last 3 Encounters:   04/30/18 89.4 kg (197 lb)   04/30/18 89.4 kg (197 lb 1.6 oz)   10/13/17 88 kg (194 lb)              We Performed the Following     (97800) ICD DEVICE PROGRAMMING EVAL, SINGLE LEAD ICD          Today's Medication Changes          These changes are accurate as of 4/30/18  3:33 PM.  If you have any questions, ask your nurse or doctor.               Start taking these medicines.        Dose/Directions    glimepiride 4 MG  tablet   Commonly known as:  AMARYL   Used for:  Type 2 diabetes mellitus with hyperglycemia, with long-term current use of insulin (H)   Started by:  Yue Dowd MD        Dose:  4 mg   Take 1 tablet (4 mg) by mouth every morning (before breakfast)   Quantity:  180 tablet   Refills:  3            Where to get your medicines      These medications were sent to [facility] [place] - [place], [place] - [address]  [address], [place] [address]     Phone:  [phone]     glimepiride 4 MG tablet                Primary Care Provider Office Phone # Fax #    Anna Corina Archuleta, MISTY [phone] [address] [place] [address]        Equal Access to Services     TAMMY RUSH : Hadii rajani escamilla hadasho Soomaali, waaxda luqadaha, qaybta kaalmada adeegyada, waxanastasia rubioin haymargarita graves . So [facility] [phone].    ATENCIÓN: Si habla español, tiene a lainez disposición servicios gratuitos de asistencia lingüística. Llame al [phone].    We comply with applicable federal civil rights laws and [place] laws. We do not discriminate on the basis of race, color, national origin, age, disability, sex, sexual orientation, or gender identity.            Thank you!     Thank you for choosing [facility]  for your care. Our goal is always to provide you with excellent care. Hearing back from our patients is one way we can continue to improve our services. Please take a few minutes to complete the written survey that you may receive in the mail after your visit with us. Thank you!             Your Updated Medication List - Protect others around you: Learn how to safely use, store and throw away your medicines at www.disposemymeds.org.          This list is accurate as of 4/30/18  3:33 PM.  Always use your most recent med list.                   Brand Name Dispense Instructions for use Diagnosis    aspirin 81 MG tablet     30 tablet    Take 1 tablet (81 mg) by  mouth daily    Ischemic cardiomyopathy       atorvastatin 40 MG tablet    LIPITOR    90 tablet    TAKE ONE TABLET BY MOUTH EVERY DAY    Chronic systolic heart failure (H), Wheezing, CKD (chronic kidney disease) stage 3, GFR 30-59 ml/min, CHF (NYHA class II, ACC/AHA stage C) (H)       B-D U/F 31G X 5 MM   Generic drug:  insulin pen needle     100 each    USE 1 NEEDLE DAILY AS DIRECTED    Type 2 diabetes, HbA1C goal < 8% (H)       blood glucose monitoring meter device kit     1 kit    Use to test blood sugar 3-4 times daily, as directed.    Type 2 diabetes mellitus with diabetic nephropathy, with long-term current use of insulin (H)       carvedilol 25 MG tablet    COREG    180 tablet    Take 1 tablet (25 mg) by mouth 2 times daily (with meals)    Hypertension goal BP (blood pressure) < 140/90       clopidogrel 75 MG tablet    PLAVIX    90 tablet    Take 1 tablet (75 mg) by mouth daily    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 (H)       furosemide 40 MG tablet    LASIX    180 tablet    Take 1 tablet (40 mg) by mouth daily - Take an extra 40mg as needed if weight goes up 2 pounds overnight, or more than 5 pounds in 1 week.    CHF (NYHA class II, ACC/AHA stage C) (H)       glimepiride 4 MG tablet    AMARYL    180 tablet    Take 1 tablet (4 mg) by mouth every morning (before breakfast)    Type 2 diabetes mellitus with hyperglycemia, with long-term current use of insulin (H)       glipiZIDE 10 MG 24 hr tablet    glipiZIDE XL    90 tablet    Take 1 tablet (10 mg) by mouth daily    Type 2 diabetes mellitus with diabetic nephropathy, with long-term current use of insulin (H)       * insulin glargine 100 UNIT/ML injection    LANTUS SOLOSTAR    15 mL    Inject 35 Units Subcutaneous At Bedtime    Type 2 diabetes mellitus with diabetic nephropathy, with long-term current use of insulin (H)       * LANTUS SOLOSTAR 100 UNIT/ML injection   Generic drug:  insulin  glargine     15 mL    INJECT 40 UNITS UNDER THE SKIN AT BEDTIME    Type 2 diabetes mellitus with diabetic nephropathy, with long-term current use of insulin (H)       isosorbide mononitrate 30 MG 24 hr tablet    IMDUR    135 tablet    Take 1.5 tablets (45 mg) by mouth daily    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 (H)       * ONETOUCH ULTRA test strip   Generic drug:  blood glucose monitoring     100 each    USE TO TEST BLOOD SUGAR TWO TIMES A DAY OR AS DIRECTED    Type 2 diabetes mellitus with diabetic nephropathy, with long-term current use of insulin (H)       * blood glucose monitoring test strip    ONETOUCH ULTRA    100 each    Use to test blood sugar 3 times daily or as directed.    Type 2 diabetes mellitus with diabetic nephropathy, with long-term current use of insulin (H)       order for DME     1 Units    Auto CPAP 8-15 cmH20        sitagliptin 50 MG tablet    JANUVIA    90 tablet    Take 1 tablet (50 mg) by mouth daily    Type 2 diabetes mellitus with hyperglycemia, with long-term current use of insulin (H), Type 2 diabetes mellitus with stage 3 chronic kidney disease, with long-term current use of insulin (H)       spironolactone 25 MG tablet    ALDACTONE    135 tablet    Take 1.5 tablets (37.5 mg) by mouth daily    CHF (NYHA class II, ACC/AHA stage C) (H)       * Notice:  This  list has 4 medication(s) that are the same as other medications prescribed for you. Read the directions carefully, and ask your doctor or other care provider to review them with you.

## 2018-04-30 NOTE — LETTER
"2018      RE: Lucian Oliveira  [address] [facility] [address]       2018     Anna Archuleta PA-C    [address]  [address]       RE: Lucian Oliveira   MRN: [id number]   : 1955      Dear Ms. Kathe:      We had the pleasure of seeing Mr. Lucian Oliveira for followup in our Advanced Heart Failure Clinic.  As you know, he is a 62-year-old gentleman with ischemic cardiomyopathy and severe LV systolic dysfunction who is currently on optimal guideline-directed medical therapy.  He returns today for follow-up.        Mr. Oliveira states that he is doing well. He denies lightheadedness, chest pain, or shortness of breath. He is able to walk five to six blocks before he needs to stop. He feels like this is more than he was able to do previously. When he stops it is usually due to leg pain, heavy breathing. He walks on a treadmill every other day. He denies PND, orthopnea. Remains consistent with his CPAP. Denies any device shocks.    REVIEW OF SYSTEMS:  A detailed 10-point review of systems was obtained as described in the History of Present Illness.  All other systems were reviewed and are negative.      CURRENT MEDICATIONS:   1.  Aspirin 81 mg daily.   2.  Lipitor 40 mg daily.   3.  Coreg 25 mg twice daily.   4.  Cozaar 50 mg once daily.   5.  Aldactone 12.5 mg daily.   6.  Isosorbide mononitrate 30 mg daily.   7.  Lantus insulin 28 units at bedtime.   8.  Glipizide XL 10 mg once a day.   9.  Lasix 40 mg daily.      PHYSICAL EXAMINATION:    /65  Pulse 71  Ht 1.651 m (5' 5\")  Wt 89.4 kg (197 lb)  SpO2 96%  BMI 32.78 kg/m2  He had no pallor, cyanosis or jaundice.  His neck exam revealed no JVD, thyromegaly or carotid bruit.  Carotids were 1+ bilaterally.  He had trace bilateral lower extremity edema.  His extremities were warm and well perfused.  Cardiac auscultation revealed normal S1 and S2 with no murmur, rub or gallop.  " Auscultation of the lungs revealed equal air entry on both sides with no added sounds.  His abdomen was soft with normal bowel sounds, no tenderness, no rigidity, no guarding.      ECHO:  He has severe LV systolic dysfunction with regional wall motion abnormality.  His EF was 25%.  He had anteroseptal and lateral wall akinesia.  His left ventricular end-diastolic diameter was 5.6 cm.  He has normal right ventricular size and systolic function.  He had no significant atrial enlargement, mitral regurgitation or tricuspid regurgitation.  He had no pericardial effusion.      CPX:  Walked for 8 minutes and 16 seconds.  He achieved anaerobic threshold with an RER of 1.13.  His VO2 was 11.4 mL/kg/minute.  He had no significant increase in blood pressure with exercise.  His VE/VCO2 slope was 36.  When compared to his last cardiopulmonary exercise testing, there was no significant change.  His VO2 is still low at 11.4.     Lab Results   Component Value Date    WBC 7.4 04/30/2018    HGB 14.1 04/30/2018    HCT 41.3 04/30/2018     04/30/2018     04/30/2018    POTASSIUM 5.7 (H) 04/30/2018    CHLORIDE 100 04/30/2018    CO2 24 04/30/2018    BUN 38 (H) 04/30/2018    CR 1.87 (H) 04/30/2018     (H) 04/30/2018    NTBNPI 8936 (H) 06/26/2015    NTBNP 1136 (H) 04/30/2018    AST 17 07/05/2017    ALT 28 07/05/2017    ALKPHOS 113 07/05/2017    BILITOTAL 0.6 07/05/2017    INR 1.01 04/02/2015          IMPRESSION:    Mr. Lucian Oliveira is a 61-year-old with ischemic cardiomyopathy and severe LV systolic dysfunction who is currently on optimal guideline-directed medical therapy.      He is currently functional class II.  He is not significantly volume overloaded.  He has no evidence of end-organ hypoperfusion. He continues to remain stable from a heart failure standpoint despite his VO2 on his cardiopulmonary stress test. He continues to be active. He is blood type O.  He is on the heavier side.  He would have a long wait  time on a cardiac transplant list.  Furthermore, it is difficult for someone of his blood type and size to get a heart transplant as a status 2.  He is not inotrope dependent now. Although it is low, his VO2 has been stable for the last 2 years. Finally, his 1-year mortality based on SHFM is ~ 8% ( less than transplant or VAD).  In view of this, we will continue to monitor him on medical therapy for now with close monitoring.    If he were to deteriorate with worsening end-organ perfusional or functional capacity, then he would likely need an LVAD as a bridge to transplant. This would also give us time to control his diabetes. This remains uncontrolled with an elevated blood sugar and hemoglobin A1c. We will continue to encourage adequate control of this.    He can return to clinic in six months. Mr. Oliveira was discussed with Dr. Sheridan.    Michele Gan MD  Advanced Heart Failure/Heart Transplant Fellow  [facility] Division of Cardiology   [phone]    I have personally seen and examined the patient, and then discussed with Dr. Gan, and agree with the findings and plan in this note. I have reviewed today's vital signs, medications, labs, and imaging.     Sincerely,    Arvin Sheridan MD   [facility]pertension  Section of Advanced Heart Failure   Cardiovascular Division  [facility]   [phone]      Arvin Sheridan MD

## 2018-04-30 NOTE — PATIENT INSTRUCTIONS
You were seen today in the Cardiovascular Clinic at the [facility].      Cardiology Providers you saw during your visit: Dr. Sheridan       Medication Changes:   Hold Spironolactone  Kayexalate 30 grams x 1 dose      Patient Instructions:  EKG to be completed in clinic today  Repeat labs on Friday  Schedule a cardiopulmonary function test to be done in 1-2.   Please call with any questions or concerns [phone] AURORA Lambert      Follow up Appointment Information:  With Dr. Sheridan in 6 months.      Results:  Results for BUCK ALFARO (MRN [id number]) as of 4/30/2018 15:09   Ref. Range 4/30/2018 11:48   Sodium Latest Ref Range: 133 - 144 mmol/L 134   Potassium Latest Ref Range: 3.4 - 5.3 mmol/L 5.7 (H)   Chloride Latest Ref Range: 94 - 109 mmol/L 100   Carbon Dioxide Latest Ref Range: 20 - 32 mmol/L 24   Urea Nitrogen Latest Ref Range: 7 - 30 mg/dL 38 (H)   Creatinine Latest Ref Range: 0.66 - 1.25 mg/dL 1.87 (H)   GFR Estimate Latest Ref Range: >60 mL/min/1.7m2 36 (L)   GFR Estimate If Black Latest Ref Range: >60 mL/min/1.7m2 44 (L)   Calcium Latest Ref Range: 8.5 - 10.1 mg/dL 8.8   Anion Gap Latest Ref Range: 3 - 14 mmol/L 9   N-Terminal Pro Bnp Latest Ref Range: 0 - 125 pg/mL 1136 (H)   Glucose Latest Ref Range: 70 - 99 mg/dL 319 (H)   WBC Latest Ref Range: 4.0 - 11.0 10e9/L 7.4   Hemoglobin Latest Ref Range: 13.3 - 17.7 g/dL 14.1   Hematocrit Latest Ref Range: 40.0 - 53.0 % 41.3   Platelet Count Latest Ref Range: 150 - 450 10e9/L 187   RBC Count Latest Ref Range: 4.4 - 5.9 10e12/L 4.40   MCV Latest Ref Range: 78 - 100 fl 94   MCH Latest Ref Range: 26.5 - 33.0 pg 32.0   MCHC Latest Ref Range: 31.5 - 36.5 g/dL 34.1   RDW Latest Ref Range: 10.0 - 15.0 % 13.2        [address] [facility] on [address]rd [place] [address]       Thank you for allowing us to be a part of your care here at the [facility] Heart [facility]     If you have questions or concerns please contact us  "at:     Petra Hall RN BSN   Cardiology Care Coordinator   [facility]  Questions and [phone]  First press #1 for the University and then press #3 for \"Medical Advise\" to reach a Cardiology Nurse.                                                                                ** Please note that you will NOT receive a reminder call regarding your scheduled testing, reminder calls are for provider appointments only.  If you are scheduled for testing within the [employer] system you may receive a call regarding pre-registration for billing purposes only.**       "

## 2018-04-30 NOTE — PATIENT INSTRUCTIONS
It was a pleasure to see you in clinic today. Please do not hesitate to call with any questions or concerns. You are scheduled for a remote 3 transmission on 7/30/2018. This will happen automatically in the night. We will call in 1-2 business days to discuss the results with you. We look forward to seeing you in clinic at your next device check in 6 months.    Cora Santa, RN  Electrophysiology Nurse Clinician  [facility]  During business hours call:  [phone]  After business hours please call: [phone]- select option #4 and ask for job code [id number].

## 2018-04-30 NOTE — NURSING NOTE
Chief Complaint   Patient presents with     Follow Up For     HF 1 year f/u. Cx 6 mon f/u.      Vitals were taken and medications were reconciled. EKG was performed    Trudi PALACIOS  3:29 PM

## 2018-05-02 ENCOUNTER — HOSPITAL ENCOUNTER (OUTPATIENT)
Dept: CARDIOLOGY | Facility: CLINIC | Age: 63
Discharge: HOME | End: 2018-05-02
Attending: INTERNAL MEDICINE | Admitting: INTERNAL MEDICINE
Payer: MEDICARE

## 2018-05-02 ENCOUNTER — TELEPHONE (OUTPATIENT)
Dept: ENDOCRINOLOGY | Facility: CLINIC | Age: 63
End: 2018-05-02

## 2018-05-02 DIAGNOSIS — I50.22 CHRONIC SYSTOLIC HEART FAILURE (H): ICD-10-CM

## 2018-05-02 PROCEDURE — 94621 CARDIOPULM EXERCISE TESTING: CPT | Performed by: INTERNAL MEDICINE

## 2018-05-02 PROCEDURE — 94621 CARDIOPULM EXERCISE TESTING: CPT | Mod: 26 | Performed by: INTERNAL MEDICINE

## 2018-05-02 NOTE — TELEPHONE ENCOUNTER
Patient calling to get a medication prescribed for his stomach and told me that you know about the issue he is having.

## 2018-05-02 NOTE — TELEPHONE ENCOUNTER
Lucian was notified  to  Contact he PCP for stomach medications. His response was OK and mentioning  Anna Archuleta.

## 2018-05-02 NOTE — TELEPHONE ENCOUNTER
"Spoke with patient via , per patient he was instructed by Dr Sheridan to get a \"medicine to clean up his stomach\" but no medication sent to pharmacy.  Unsure of symptoms or is this for a procedure? Or was he to f/u with pcp?  Left message on 's nurse Isabella's  voice mail ([phone]) to please contact patient to assist with this or to call Anna Archuleta's office.  Grace Ricketts RN    "

## 2018-05-02 NOTE — TELEPHONE ENCOUNTER
----- Message from Yue Dowd MD sent at 5/1/2018  6:58 PM CDT -----  Regarding: RE: medication for stomach   I think PCP.    Yue  ----- Message -----     From: Jeimy Casas RN     Sent: 5/1/2018  10:01 AM       To: Yue Dowd MD  Subject: medication for stomach                           Lucian is that the pharmacy and thought  You were going to send a script over for his stomach ? I don't see anything in  the notes . Is he to see his PCP for this?

## 2018-05-03 NOTE — TELEPHONE ENCOUNTER
Yes I can discuss the stomach med with him, or he can call the provider who talked about prescribing it...

## 2018-05-03 NOTE — TELEPHONE ENCOUNTER
"Would you like Lucian to schedule an appt with you to discuss \"stomach medication\"? It looks like you wanted to see him 1 week after he saw endo, he saw them on 4/30/18.   From your note on 9/27/17-  Patient Instructions   Follow up with the diabetic educator/nutritionist for a refresher course and to go over carb counting again.  Follow up with the endocrinologist in 2 months (November).   I'd like to see you 1 week after you see the endocrinologist.    "

## 2018-05-03 NOTE — TELEPHONE ENCOUNTER
Called Lucian via . Informed him that he should make a follow up appointment with Anna to discuss or call the Dr. Office of the dr that talked to him about this medication. He said he would contact that clinic to discuss medication. Offered to help him make a follow up appt with Anna as per note below and he declined at this time.

## 2018-05-04 DIAGNOSIS — E87.5 HYPERKALEMIA: ICD-10-CM

## 2018-05-04 LAB
ANION GAP SERPL CALCULATED.3IONS-SCNC: 6 MMOL/L (ref 3–14)
BUN SERPL-MCNC: 38 MG/DL (ref 7–30)
CALCIUM SERPL-MCNC: 8.9 MG/DL (ref 8.5–10.1)
CHLORIDE SERPL-SCNC: 106 MMOL/L (ref 94–109)
CO2 SERPL-SCNC: 26 MMOL/L (ref 20–32)
CREAT SERPL-MCNC: 1.96 MG/DL (ref 0.66–1.25)
GFR SERPL CREATININE-BSD FRML MDRD: 35 ML/MIN/1.7M2
GLUCOSE SERPL-MCNC: 154 MG/DL (ref 70–99)
POTASSIUM SERPL-SCNC: 4.8 MMOL/L (ref 3.4–5.3)
SODIUM SERPL-SCNC: 138 MMOL/L (ref 133–144)

## 2018-05-04 PROCEDURE — 36415 COLL VENOUS BLD VENIPUNCTURE: CPT | Performed by: INTERNAL MEDICINE

## 2018-05-04 PROCEDURE — 80048 BASIC METABOLIC PNL TOTAL CA: CPT | Performed by: INTERNAL MEDICINE

## 2018-05-07 ENCOUNTER — CARE COORDINATION (OUTPATIENT)
Dept: CARDIOLOGY | Facility: CLINIC | Age: 63
End: 2018-05-07

## 2018-05-07 NOTE — PROGRESS NOTES
Results called to patient via . Pt instructed to continue to hold aldactone. Pt verbalized an understanding.

## 2018-05-31 ENCOUNTER — OFFICE VISIT (OUTPATIENT)
Dept: EDUCATION SERVICES | Facility: CLINIC | Age: 63
End: 2018-05-31
Payer: MEDICARE

## 2018-05-31 DIAGNOSIS — Z79.4 TYPE 2 DIABETES MELLITUS WITH DIABETIC NEPHROPATHY, WITH LONG-TERM CURRENT USE OF INSULIN (H): Primary | ICD-10-CM

## 2018-05-31 DIAGNOSIS — E11.21 TYPE 2 DIABETES MELLITUS WITH DIABETIC NEPHROPATHY, WITH LONG-TERM CURRENT USE OF INSULIN (H): Primary | ICD-10-CM

## 2018-05-31 NOTE — PATIENT INSTRUCTIONS
1.  Test your blood sugar 4 times per day.  Check before breakfast, lunch, dinner and bedtime.      2.  Take your medications as directed.    3.  Try to limit portions of the foods that raise your blood sugar.    4.  Return 6/14/17 at 11:30pm    Suyapa Dias RN,CDE  [address] [address]  Phone: [phone] or [phone]  [email or web address]

## 2018-05-31 NOTE — PROGRESS NOTES
DIABETES EDUCATION NOTE:    Lucian Oliveira presents today for education related to Type 2 diabetes.    Patient is being treated with:  oral agents, diet, exercise and insulin  He is accompanied by spouse and     PATIENT CONCERNS RELATED TO DIABETES SELF MANAGEMENT:   My blood sugar is too high      Current Diabetes Management per Patient:  Taking diabetes medications?   yes:     Diabetes Medication(s)     Dipeptidyl Peptidase-4 (DPP-4) Inhibitors Sig    sitagliptin (JANUVIA) 50 MG tablet Take 1 tablet (50 mg) by mouth daily    Insulin Sig    insulin glargine (LANTUS SOLOSTAR) 100 UNIT/ML injection Inject 35 Units Subcutaneous At Bedtime    LANTUS SOLOSTAR 100 UNIT/ML soln INJECT 40 UNITS UNDER THE SKIN AT BEDTIME    Sulfonylureas Sig    glimepiride (AMARYL) 4 MG tablet Take 1 tablet (4 mg) by mouth every morning (before breakfast)    glipiZIDE (GLIPIZIDE XL) 10 MG 24 hr tablet Take 1 tablet (10 mg) by mouth daily        Monitoring  Patient glucose self monitoring as follows: four times daily.   BG meter: Accu-chek Alexandra meter  BG results: fasting glucose- today 152     BG values are: Not in goal  Patient's most recent   Lab Results   Component Value Date    A1C 9.8 09/06/2017    is not meeting goal of <8.0      Exercise: some walking outside, yard work    Nutrition:   Patient currently eats 3 meals 1-2 snacks per day      Hypoglycemia:   Patient is at risk of hypoglycemia? Yes, rare, feels shaky                         EDUCATION and INSTRUCTION PROVIDED AT THIS VISIT:    We discussed the concerns about Lucian's blood sugar problems in relation to his kidney function and retinopathy.  Pictures were used to convey the damage high blood sugar does over time.  He and his wife seemed to have a better understanding of the seriousness of his situation by the end of our talk.  They state they are ready to work on this.    A Navjot Pro (Lot [id number]) was placed to get more information about blood sugar  patterns.    Patient-stated goal written and given to Lucian Oliveira.  Verbalized and demonstrated understanding of instructions.     PLAN:  See patient instructions  AVS printed and given to patient    FOLLOW-UP:    14 days    Time spent with patient at today's visit was 60 minutes.      Any diabetes medication dose changes were made via the CDE Protocol and Collaborative Practice Agreement with [facility] and  [facility].  A copy of this encounter was provided to patient's referring provider.

## 2018-05-31 NOTE — MR AVS SNAPSHOT
After Visit Summary   5/31/2018    Lucian Oliveira    MRN: [id number]           Patient Information     Date Of Birth          1955        Visit Information        Provider Department      5/31/2018 11:15 AM Reyes, Rebecca; Suyapa Dias RN [facility] Diabetes        Care Instructions    1.  Test your blood sugar 4 times per day.  Check before breakfast, lunch, dinner and bedtime.      2.  Take your medications as directed.    3.  Try to limit portions of the foods that raise your blood sugar.    4.  Return 6/14/17 at 11:30pm    Suyapa Dias RN,CDE  [address] [address]  Phone: [phone] or [phone]  [email or web address]              Follow-ups after your visit        Your next 10 appointments already scheduled     Aug 06, 2018  9:30 AM CDT   (Arrive by 9:15 AM)   RETURN DIABETES with Marisabel Prieto PA-C   [facility] Endocrinology ([facility])    [address] [place] [address]  3rd [facility] [address] [phone]            Oct 22, 2018  2:00 PM CDT   Lab with UC LAB   [facility] Lab ([facility])    [address] [place] [address] [phone]            Oct 22, 2018  2:30 PM CDT   (Arrive by 2:15 PM)   Implanted Defibulator with Uc Cv Device 1   [facility] Heart [facility] ([facility])    [address] [place] [address]  [address] [place] [address] [phone]            Oct 22, 2018  3:00 PM CDT   (Arrive by 2:45 PM)   RETURN HEART FAILURE with Arvin Sheridan MD   [facility] ([facility])    [address] [place] [address]  [address] [place] [address] [phone]              Who to contact     Please call your clinic at [phone] to:    Ask questions about your health    Make or cancel appointments    Discuss your medicines    Learn about your test results    Speak to your doctor            Additional  Information About Your Visit        [employer] Information     [employer] gives you secure access to your electronic health record. If you see a primary care provider, you can also send messages to your care team and make appointments. If you have questions, please call your primary care clinic.  If you do not have a primary care provider, please call [phone] and they will assist you.      [employer] is an electronic gateway that provides easy, online access to your medical records. With [employer], you can request a clinic appointment, read your test results, renew a prescription or communicate with your care team.     To access your existing account, please contact your [facility] or call [phone] for assistance.        Care EveryWhere ID     This is your Care EveryWhere ID. This could be used by other organizations to access your [place] medical records  [id number]         Blood Pressure from Last 3 Encounters:   04/30/18 105/65   04/30/18 116/74   09/27/17 109/65    Weight from Last 3 Encounters:   04/30/18 89.4 kg (197 lb)   04/30/18 89.4 kg (197 lb 1.6 oz)   10/13/17 88 kg (194 lb)              Today, you had the following     No orders found for display       Primary Care Provider Office Phone # Fax #    Anna Archuleta PA-C [phone]       [address] [facility] [address] [place] [address]        Equal Access to Services     [facility]: Hadii aad ku hadasho Soomaali, waaxda luqadaha, qaybta kaalmada adeegyada, joana vasquez haymargarita mcclellanaralucia graves . So [facility] [phone].    ATENCIÓN: Si habla español, tiene a lainez disposición servicios gratuitos de asistencia lingüística. Llame al [phone].    We comply with applicable federal civil rights laws and [place] laws. We do not discriminate on the basis of race, color, national origin, age, disability, sex, sexual orientation, or gender identity.            Thank you!     Thank you for choosing [facility] DIABETES   for your care. Our goal is always to provide you with excellent care. Hearing back from our patients is one way we can continue to improve our services. Please take a few minutes to complete the written survey that you may receive in the mail after your visit with us. Thank you!             Your Updated Medication List - Protect others around you: Learn how to safely use, store and throw away your medicines at www.disposemymeds.org.          This list is accurate as of 5/31/18 12:13 PM.  Always use your most recent med list.                   Brand Name Dispense Instructions for use Diagnosis    aspirin 81 MG tablet     30 tablet    Take 1 tablet (81 mg) by mouth daily    Ischemic cardiomyopathy       atorvastatin 40 MG tablet    LIPITOR    90 tablet    TAKE ONE TABLET BY MOUTH EVERY DAY    Chronic systolic heart failure (H), Wheezing, CKD (chronic kidney disease) stage 3, GFR 30-59 ml/min, CHF (NYHA class II, ACC/AHA stage C) (H)       B-D U/F 31G X 5 MM   Generic drug:  insulin pen needle     100 each    USE 1 NEEDLE DAILY AS DIRECTED    Type 2 diabetes, HbA1C goal < 8% (H)       blood glucose monitoring meter device kit     1 kit    Use to test blood sugar 3-4 times daily, as directed.    Type 2 diabetes mellitus with diabetic nephropathy, with long-term current use of insulin (H)       carvedilol 25 MG tablet    COREG    180 tablet    Take 1 tablet (25 mg) by mouth 2 times daily (with meals)    Hypertension goal BP (blood pressure) < 140/90       clopidogrel 75 MG tablet    PLAVIX    90 tablet    Take 1 tablet (75 mg) by mouth daily    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 (H)       furosemide 40 MG tablet    LASIX    180 tablet    Take 1 tablet (40 mg) by mouth daily - Take an extra 40mg as needed if weight goes up 2 pounds overnight, or more than 5 pounds in 1 week.    CHF (NYHA class II, ACC/AHA stage C) (H)       glimepiride 4 MG tablet     AMARYL    180 tablet    Take 1 tablet (4 mg) by mouth every morning (before breakfast)    Type 2 diabetes mellitus with hyperglycemia, with long-term current use of insulin (H)       glipiZIDE 10 MG 24 hr tablet    glipiZIDE XL    90 tablet    Take 1 tablet (10 mg) by mouth daily    Type 2 diabetes mellitus with diabetic nephropathy, with long-term current use of insulin (H)       * insulin glargine 100 UNIT/ML injection    LANTUS SOLOSTAR    15 mL    Inject 35 Units Subcutaneous At Bedtime    Type 2 diabetes mellitus with diabetic nephropathy, with long-term current use of insulin (H)       * LANTUS SOLOSTAR 100 UNIT/ML injection   Generic drug:  insulin glargine     15 mL    INJECT 40 UNITS UNDER THE SKIN AT BEDTIME    Type 2 diabetes mellitus with diabetic nephropathy, with long-term current use of insulin (H)       isosorbide mononitrate 30 MG 24 hr tablet    IMDUR    135 tablet    Take 1.5 tablets (45 mg) by mouth daily    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 (H)       * ONETOUCH ULTRA test strip   Generic drug:  blood glucose monitoring     100 each    USE TO TEST BLOOD SUGAR TWO TIMES A DAY OR AS DIRECTED    Type 2 diabetes mellitus with diabetic nephropathy, with long-term current use of insulin (H)       * blood glucose monitoring test strip    ONETOUCH ULTRA    100 each    Use to  test blood sugar 3 times daily or as directed.    Type 2 diabetes mellitus with diabetic nephropathy, with long-term current use of insulin (H)       order for DME     1 Units    Auto CPAP 8-15 cmH20        sitagliptin 50 MG tablet    JANUVIA    90 tablet    Take 1 tablet (50 mg) by mouth daily    Type 2 diabetes mellitus with hyperglycemia, with long-term current use of insulin (H), Type 2 diabetes mellitus with stage 3 chronic kidney disease, with long-term current use of insulin (H)       * Notice:  This list has 4 medication(s) that are the same as other medications prescribed for you. Read the directions carefully, and ask your doctor or other care provider to review them with you.

## 2018-06-05 DIAGNOSIS — Z79.4 TYPE 2 DIABETES MELLITUS WITH DIABETIC NEPHROPATHY, WITH LONG-TERM CURRENT USE OF INSULIN (H): Chronic | ICD-10-CM

## 2018-06-05 DIAGNOSIS — I50.22 CHRONIC SYSTOLIC HEART FAILURE (H): ICD-10-CM

## 2018-06-05 DIAGNOSIS — I50.9 CHF (NYHA CLASS II, ACC/AHA STAGE C) (H): ICD-10-CM

## 2018-06-05 DIAGNOSIS — E11.21 TYPE 2 DIABETES MELLITUS WITH DIABETIC NEPHROPATHY, WITH LONG-TERM CURRENT USE OF INSULIN (H): Chronic | ICD-10-CM

## 2018-06-05 DIAGNOSIS — N18.30 CKD (CHRONIC KIDNEY DISEASE) STAGE 3, GFR 30-59 ML/MIN (H): ICD-10-CM

## 2018-06-05 DIAGNOSIS — R06.2 WHEEZING: ICD-10-CM

## 2018-06-05 DIAGNOSIS — I25.739 CORONARY ARTERY DISEASE INVOLVING NONAUTOLOGOUS BIOLOGICAL CORONARY BYPASS GRAFT WITH ANGINA PECTORIS (H): ICD-10-CM

## 2018-06-05 NOTE — TELEPHONE ENCOUNTER
Routing refill request to provider for review/approval because:  Pt overdue for labs and appt.   All meds pended for 30 day supply with reminder.

## 2018-06-05 NOTE — TELEPHONE ENCOUNTER
"Requested Prescriptions   Pending Prescriptions Disp Refills     losartan (COZAAR) 50 MG tablet [Pharmacy Med Name: LOSARTAN POTASSIUM 50MG TABS] 90 tablet 3    Last Written Prescription Date:  5/1/18  Last Fill Quantity: 90,  # refills: 1   Last office visit: 9/27/2017 with prescribing provider:  9/27/17 Kathe   Future Office Visit:   Next 5 appointments (look out 90 days)     Jun 14, 2018 11:30 AM CDT   (Arrive by 11:15 AM)   Office Visit with Suyapa Dias RN   [facility] Diabetes ([facility])    [address] [place] [address] [phone]                Sig: TAKE ONE TABLET BY MOUTH EVERY DAY    Angiotensin-II Receptors Failed    6/5/2018 10:55 AM       Failed - Normal serum creatinine on file in past 12 months    Recent Labs   Lab Test  05/04/18   0740   CR  1.96*            Passed - Blood pressure under 140/90 in past 12 months    BP Readings from Last 3 Encounters:   04/30/18 105/65   04/30/18 116/74   09/27/17 109/65                Passed - Recent (12 mo) or future (30 days) visit within the authorizing provider's specialty    Patient had office visit in the last 12 months or has a visit in the next 30 days with authorizing provider or within the authorizing provider's specialty.  See \"Patient Info\" tab in inbasket, or \"Choose Columns\" in Meds & Orders section of the refill encounter.           Passed - Patient is age 18 or older       Passed - Normal serum potassium on file in past 12 months    Recent Labs   Lab Test  05/04/18   0740   POTASSIUM  4.8                    LANTUS SOLOSTAR 100 UNIT/ML soln [Pharmacy Med Name: LANTUS SOLOSTAR 100UNIT/ML SOPN] 15 mL 0    Last Written Prescription Date:  5/1/18  Last Fill Quantity: 15ml,  # refills: 0   Last office visit: 9/27/2017 with prescribing provider:  9/27/17 Kathe   Future Office Visit:   Next 5 appointments (look out 90 days)     Jun 14, 2018 11:30 AM CDT   (Arrive by 11:15 AM)   Office Visit with " "Suyapa Dias RN   [facility] Diabetes ([facility])    [address] [address] [phone]                Sig: INJECT 40 UNITS UNDER THE SKIN AT BEDTIME (PLEASE MAKE APPOINTMENT FOR FURTHER REFILLS)    Long Acting Insulin Protocol Failed    6/5/2018 10:55 AM       Failed - Recent (6 mo) or future (30 days) visit within the authorizing provider's specialty    Patient had office visit in the last 6 months or has a visit in the next 30 days with authorizing provider or within the authorizing provider's specialty.  See \"Patient Info\" tab in inbasket, or \"Choose Columns\" in Meds & Orders section of the refill encounter.           Passed - Blood pressure less than 140/90 in past 6 months    BP Readings from Last 3 Encounters:   04/30/18 105/65   04/30/18 116/74   09/27/17 109/65                Passed - LDL on file in past 12 months    Recent Labs   Lab Test  07/05/17   0732   LDL  22            Passed - Microalbumin on file in past 12 months    Recent Labs   Lab Test  04/27/17   0745   MICROL  44   UMALCR  39.38*            Passed - Serum creatinine on file in past 12 months    Recent Labs   Lab Test  05/04/18   0740   CR  1.96*            Passed - HgbA1C in past 3 or 6 months    If HgbA1C is 8 or greater, it needs to be on file within the past 3 months.  If less than 8, must be on file within the past 6 months.     Recent Labs   Lab Test 04/30/18 09/06/17   0845   A1C   --   9.8*   HEMOGLOBINA1  10.6*   --             Passed - Patient is age 18 or older        B-D U/F insulin pen needle [Pharmacy Med Name: BD PEN NEEDLE MINI  31G X 5 MM MISC] 100 each 3    Last Written Prescription Date:  5/1/18  Last Fill Quantity: 100,  # refills: 3   Last office visit: 9/27/2017 with prescribing provider:  9/27/17 Knaeble   Future Office Visit:   Next 5 appointments (look out 90 days)     Jun 14, 2018 11:30 AM CDT   (Arrive by 11:15 AM)   Office Visit with Suyapa Dias, " "[facility]URORA RUI[facility] Diabetes ([facility])    [address] [address]   [phone]                Sig: USE 1 NEEDLE DAILY AS DIRECTED    Diabetic Supplies Protocol Failed    6/5/2018 10:55 AM       Failed - Recent (6 mo) or future (30 days) visit within the authorizing provider's specialty    Patient had office visit in the last 6 months or has a visit in the next 30 days with authorizing provider.  See \"Patient Info\" tab in inbasket, or \"Choose Columns\" in Meds & Orders section of the refill encounter.           Passed - Patient is 18 years of age or older        isosorbide mononitrate (IMDUR) 30 MG 24 hr tablet [Pharmacy Med Name: ISOSORBIDE MONONITRATE ER 30 TB24] 135 tablet 3    Last Written Prescription Date:  5/1/18  Last Fill Quantity: 135,  # refills: 3   Last office visit: 9/27/2017 with prescribing provider:  9/27/17 Knaeble   Future Office Visit:   Next 5 appointments (look out 90 days)     Jun 14, 2018 11:30 AM CDT   (Arrive by 11:15 AM)   Office Visit with AURORA Barfield [facility] ([facility])    [address] [address]   [phone]                Sig: TAKE ONE AND ONE-HALF TABLETS BY MOUTH ONCE DAILY    Nitrates Passed    6/5/2018 10:55 AM       Passed - Blood pressure under 140/90 in past 12 months    BP Readings from Last 3 Encounters:   04/30/18 105/65   04/30/18 116/74   09/27/17 109/65                Passed - Pt is not on erectile dysfunction medications       Passed - Recent (12 mo) or future (30 days) visit within the authorizing provider's specialty    Patient had office visit in the last 12 months or has a visit in the next 30 days with authorizing provider or within the authorizing provider's specialty.  See \"Patient Info\" tab in inbasket, or \"Choose Columns\" in Meds & Orders section of the refill encounter.           Passed - Patient is age 18 or older        " "atorvastatin (LIPITOR) 40 MG tablet [Pharmacy Med Name: ATORVASTATIN CALCIUM 40MG TABS] 90 tablet 0    Last Written Prescription Date:  5/1/18  Last Fill Quantity: 90,  # refills: 0   Last office visit: 9/27/2017 with prescribing provider:  9/27/17 Kylemini   Future Office Visit:   Next 5 appointments (look out 90 days)     Jun 14, 2018 11:30 AM CDT   (Arrive by 11:15 AM)   Office Visit with Suyapa Dias RN   [facility])    [address] [place] [id number] [phone]                Sig: TAKE ONE TABLET BY MOUTH EVERY DAY    Statins Protocol Passed    6/5/2018 10:55 AM       Passed - LDL on file in past 12 months    Recent Labs   Lab Test  07/05/17   0732   LDL  22            Passed - No abnormal creatine kinase in past 12 months    No lab results found.            Passed - Recent (12 mo) or future (30 days) visit within the authorizing provider's specialty    Patient had office visit in the last 12 months or has a visit in the next 30 days with authorizing provider or within the authorizing provider's specialty.  See \"Patient Info\" tab in inbasket, or \"Choose Columns\" in Meds & Orders section of the refill encounter.           Passed - Patient is age 18 or older        clopidogrel (PLAVIX) 75 MG tablet [Pharmacy Med Name: CLOPIDOGREL BISULFATE 75MG TABS] 90 tablet 3    Last Written Prescription Date:  5/1/18  Last Fill Quantity: 90,  # refills: 3   Last office visit: 9/27/2017 with prescribing provider:  9/27/17 Kylemini   Future Office Visit:   Next 5 appointments (look out 90 days)     Jun 14, 2018 11:30 AM CDT   (Arrive by 11:15 AM)   Office Visit with Suyapa Dias RN   [facility])    [address] [place] [address] [phone]                Sig: TAKE ONE TABLET BY MOUTH EVERY DAY    Plavix Passed    6/5/2018 10:55 AM       Passed - No active PPI on record unless " "is Protonix       Passed - Normal HGB on file in past 12 months    Recent Labs   Lab Test  04/30/18   1148   HGB  14.1              Passed - Normal Platelets on file in past 12 months    Recent Labs   Lab Test  04/30/18   1148   PLT  187              Passed - Recent (12 mo) or future (30 days) visit within the authorizing provider's specialty    Patient had office visit in the last 12 months or has a visit in the next 30 days with authorizing provider or within the authorizing provider's specialty.  See \"Patient Info\" tab in inbasket, or \"Choose Columns\" in Meds & Orders section of the refill encounter.           Passed - Patient is age 18 or older        "

## 2018-06-06 RX ORDER — INSULIN GLARGINE 100 [IU]/ML
INJECTION, SOLUTION SUBCUTANEOUS
Qty: 15 ML | Refills: 0 | Status: SHIPPED | OUTPATIENT
Start: 2018-06-06 | End: 2018-10-03

## 2018-06-06 RX ORDER — CLOPIDOGREL BISULFATE 75 MG/1
TABLET ORAL
Qty: 30 TABLET | Refills: 0 | Status: SHIPPED | OUTPATIENT
Start: 2018-06-06 | End: 2018-09-05

## 2018-06-06 RX ORDER — FLURBIPROFEN SODIUM 0.3 MG/ML
SOLUTION/ DROPS OPHTHALMIC
Qty: 30 EACH | Refills: 0 | Status: SHIPPED | OUTPATIENT
Start: 2018-06-06 | End: 2018-09-05

## 2018-06-06 RX ORDER — ATORVASTATIN CALCIUM 40 MG/1
TABLET, FILM COATED ORAL
Qty: 30 TABLET | Refills: 0 | Status: SHIPPED | OUTPATIENT
Start: 2018-06-06 | End: 2018-08-16

## 2018-06-06 RX ORDER — ISOSORBIDE MONONITRATE 30 MG/1
TABLET, EXTENDED RELEASE ORAL
Qty: 45 TABLET | Refills: 0 | Status: SHIPPED | OUTPATIENT
Start: 2018-06-06 | End: 2018-08-16

## 2018-06-06 RX ORDER — LOSARTAN POTASSIUM 50 MG/1
TABLET ORAL
Qty: 30 TABLET | Refills: 0 | Status: SHIPPED | OUTPATIENT
Start: 2018-06-06 | End: 2018-08-16

## 2018-06-14 ENCOUNTER — OFFICE VISIT (OUTPATIENT)
Dept: EDUCATION SERVICES | Facility: CLINIC | Age: 63
End: 2018-06-14
Payer: MEDICARE

## 2018-06-14 DIAGNOSIS — E11.21 TYPE 2 DIABETES MELLITUS WITH DIABETIC NEPHROPATHY, WITH LONG-TERM CURRENT USE OF INSULIN (H): Chronic | ICD-10-CM

## 2018-06-14 DIAGNOSIS — Z79.4 TYPE 2 DIABETES MELLITUS WITH DIABETIC NEPHROPATHY, WITH LONG-TERM CURRENT USE OF INSULIN (H): Chronic | ICD-10-CM

## 2018-06-14 NOTE — MR AVS SNAPSHOT
After Visit Summary   6/14/2018    Lucian Oliveira    MRN: [id number]           Patient Information     Date Of Birth          1955        Visit Information        Provider Department      6/14/2018 11:15 AM Reyes, Rebecca; Suyapa Dias RN [facility] Diabetes        Care Instructions    1.  Scan your blood sugar before and two hours after each of your meals.    2.  Try to limit the amount of carb you eat at a meal. Try to eat 2-3 servings per meal.    3.  Move as much as you can.    4.  Take Lantus 8 units in the morning and 40 units before bed.    5.  Return: 2/27 at 1:30pm with the reader    Suyapa Dias RN,CDE  [address] [address]  Phone: [phone]  [email or web address]              Follow-ups after your visit        Your next 10 appointments already scheduled     Aug 06, 2018  9:30 AM CDT   (Arrive by 9:15 AM)   RETURN DIABETES with Marisabel Prieto PA-C   [facility] Endocrinology ([facility])    [address] [place] [id number] [phone]            Oct 22, 2018  2:00 PM CDT   Lab with UC LAB   [facility] Lab ([facility])    [address] [place] [id number] [phone]            Oct 22, 2018  2:30 PM CDT   (Arrive by 2:15 PM)   Implanted Defibulator with  Cv Device 1   [facility] ([facility])    [address] [place] [address] [phone]            Oct 22, 2018  3:00 PM CDT   (Arrive by 2:45 PM)   RETURN HEART FAILURE with Arvin Sheridan MD   [facility])    [address] [place] [address] [phone]              Who to contact     Please call your clinic at [phone] to:    Ask questions about your health    Make or cancel appointments    Discuss your medicines    Learn about your test  results    Speak to your doctor            Additional Information About Your Visit        [employer] Information     [employer] gives you secure access to your electronic health record. If you see a primary care provider, you can also send messages to your care team and make appointments. If you have questions, please call your primary care clinic.  If you do not have a primary care provider, please call [phone] and they will assist you.      [employer] is an electronic gateway that provides easy, online access to your medical records. With [employer], you can request a clinic appointment, read your test results, renew a prescription or communicate with your care team.     To access your existing account, please contact your [facility] or call [phone] for assistance.        Care EveryWhere ID     This is your Care EveryWhere ID. This could be used by other organizations to access your [facility] medical records  [id number]         Blood Pressure from Last 3 Encounters:   04/30/18 105/65   04/30/18 116/74   09/27/17 109/65    Weight from Last 3 Encounters:   04/30/18 89.4 kg (197 lb)   04/30/18 89.4 kg (197 lb 1.6 oz)   10/13/17 88 kg (194 lb)              Today, you had the following     No orders found for display       Primary Care Provider Office Phone # Fax #    Anna Archuleta PA-C [phone]       [address]  [place] [address]        Equal Access to Services     [facility]: Hadii aad ku hadasho Soomaali, waaxda luqadaha, qaybta kaalmada adeegyada, joana vasquez hayerneston arpita graves . So [facility] [phone].    ATENCIÓN: Si habla español, tiene a lainez disposición servicios gratuitos de asistencia lingüística. Llame al [phone].    We comply with applicable federal civil rights laws and [place] laws. We do not discriminate on the basis of race, color, national origin, age, disability, sex, sexual orientation, or gender identity.            Thank  you!     Thank you for choosing [facility] DIABETES  for your care. Our goal is always to provide you with excellent care. Hearing back from our patients is one way we can continue to improve our services. Please take a few minutes to complete the written survey that you may receive in the mail after your visit with us. Thank you!             Your Updated Medication List - Protect others around you: Learn how to safely use, store and throw away your medicines at www.disposemymeds.org.          This list is accurate as of 6/14/18 12:25 PM.  Always use your most recent med list.                   Brand Name Dispense Instructions for use Diagnosis    aspirin 81 MG tablet     30 tablet    Take 1 tablet (81 mg) by mouth daily    Ischemic cardiomyopathy       atorvastatin 40 MG tablet    LIPITOR    30 tablet    TAKE ONE TABLET BY MOUTH EVERY DAY    Chronic systolic heart failure (H), Wheezing, CKD (chronic kidney disease) stage 3, GFR 30-59 ml/min, CHF (NYHA class II, ACC/AHA stage C) (H)       B-D U/F 31G X 5 MM   Generic drug:  insulin pen needle     30 each    USE 1 NEEDLE DAILY AS DIRECTED    Type 2 diabetes mellitus with diabetic nephropathy, with long-term current use of insulin (H)       blood glucose monitoring meter device kit     1 kit    Use to test blood sugar 3-4 times daily, as directed.    Type 2 diabetes mellitus with diabetic nephropathy, with long-term current use of insulin (H)       carvedilol 25 MG tablet    COREG    180 tablet    Take 1 tablet (25 mg) by mouth 2 times daily (with meals)    Hypertension goal BP (blood pressure) < 140/90       clopidogrel 75 MG tablet    PLAVIX    30 tablet    TAKE ONE TABLET BY MOUTH EVERY DAY    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 (H)       furosemide 40 MG tablet    LASIX    180 tablet    Take 1 tablet (40 mg) by mouth daily - Take an extra 40mg as needed if weight goes up 2 pounds overnight, or more  than 5 pounds in 1 week.    CHF (NYHA class II, ACC/AHA stage C) (H)       glimepiride 4 MG tablet    AMARYL    180 tablet    Take 1 tablet (4 mg) by mouth every morning (before breakfast)    Type 2 diabetes mellitus with hyperglycemia, with long-term current use of insulin (H)       glipiZIDE 10 MG 24 hr tablet    glipiZIDE XL    90 tablet    Take 1 tablet (10 mg) by mouth daily    Type 2 diabetes mellitus with diabetic nephropathy, with long-term current use of insulin (H)       isosorbide mononitrate 30 MG 24 hr tablet    IMDUR    45 tablet    TAKE ONE AND ONE-HALF TABLETS BY MOUTH ONCE DAILY    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 (H)       LANTUS SOLOSTAR 100 UNIT/ML injection   Generic drug:  insulin glargine     15 mL    INJECT 40 UNITS UNDER THE SKIN AT BEDTIME (PLEASE MAKE APPOINTMENT FOR FURTHER REFILLS)    Type 2 diabetes mellitus with diabetic nephropathy, with long-term current use of insulin (H)       losartan 50 MG tablet    COZAAR    30 tablet    TAKE ONE TABLET BY MOUTH EVERY DAY    CKD (chronic kidney disease) stage 3, GFR 30-59 ml/min, CHF (NYHA class II, ACC/AHA stage C) (H), Chronic systolic heart failure (H), Wheezing       nitroGLYcerin 0.4 MG sublingual tablet    NITROSTAT    10 tablet    Place 1 tablet (0.4 mg) under the tongue every 5 minutes as needed for chest pain If you are still having symptoms after 3 doses (15 minutes) call 911.    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 (H)       * ONETOUCH ULTRA test strip   Generic drug:  blood glucose monitoring     100 each    USE TO TEST BLOOD SUGAR TWO TIMES A DAY OR AS DIRECTED    Type 2 diabetes mellitus with diabetic nephropathy, with long-term current use of insulin (H)       * blood glucose monitoring test strip    ONETOUCH ULTRA    100 each    Use to test blood sugar 3 times daily or as directed.    Type 2  diabetes mellitus with diabetic nephropathy, with long-term current use of insulin (H)       order for DME     1 Units    Auto CPAP 8-15 cmH20        sitagliptin 50 MG tablet    JANUVIA    90 tablet    Take 1 tablet (50 mg) by mouth daily    Type 2 diabetes mellitus with hyperglycemia, with long-term current use of insulin (H), Type 2 diabetes mellitus with stage 3 chronic kidney disease, with long-term current use of insulin (H)       * Notice:  This list has 2 medication(s) that are the same as other medications prescribed for you. Read the directions carefully, and ask your doctor or other care provider to review them with you.

## 2018-06-14 NOTE — PATIENT INSTRUCTIONS
1.  Scan your blood sugar before and two hours after each of your meals.    2.  Try to limit the amount of carb you eat at a meal. Try to eat 2-3 servings per meal.    3.  Move as much as you can.    4.  Take Lantus 8 units in the morning and 40 units before bed.    5.  Return: 2/27 at 1:30pm with the reader    Suyapa Dias RN,CDE  [address] [address]  Phone: [phone]  [email or web address]

## 2018-06-14 NOTE — PROGRESS NOTES
DIABETES EDUCATION NOTE:    Lucian Oliveira presents today for education related to Type 2 diabetes.    Patient is being treated with:  oral agents, diet, exercise and insulin  He is accompanied by spouse and interpretor    PATIENT CONCERNS RELATED TO DIABETES SELF MANAGEMENT:   Getting blood sugar controlled    Current Diabetes Management per Patient:  Taking diabetes medications?   yes:     Diabetes Medication(s)     Dipeptidyl Peptidase-4 (DPP-4) Inhibitors Sig    sitagliptin (JANUVIA) 50 MG tablet Take 1 tablet (50 mg) by mouth daily    Insulin Sig    LANTUS SOLOSTAR 100 UNIT/ML soln INJECT 40 UNITS UNDER THE SKIN AT BEDTIME (PLEASE MAKE APPOINTMENT FOR FURTHER REFILLS)    Sulfonylureas Sig    glimepiride (AMARYL) 4 MG tablet Take 1 tablet (4 mg) by mouth every morning (before breakfast)    glipiZIDE (GLIPIZIDE XL) 10 MG 24 hr tablet Take 1 tablet (10 mg) by mouth daily          Monitoring  Patient glucose self monitoring as follows: four times daily.   BG meter: Accu-chek Alexandra meter  BG results: see blood glucose log attached to this encounter     BG values are: Not in goal  Patient's most recent   Lab Results   Component Value Date    A1C 9.8 09/06/2017    is meeting goal of <8.0      Exercise: sporadic or irregular exercise    Nutrition:   Patient currently eats 3 meals 1-2 snacks per day     Breakfast: (8-9a) egg, 1 slice wheat bread with peanut butter or jam with coffee  Snack:  Lunch:(1p) veggie soup or beef steak or chicken with rice with radishes, 2 tortillas, spinach,   Snack:   Dinner: (6p) coffee with 1 slice of wheat bread  Snack: nuts      Hypoglycemia:   Patient is at risk of hypoglycemia? Yes, has had lows before and felt shaky                       EDUCATION and INSTRUCTION PROVIDED AT THIS VISIT:    The Navjot Pro Lucian was wearing was scanned and uploaded.  We discussed the reports at length.     His average blood sugar for the two weeks he wore the sensor is 219 mg/dl.  This was discussed in  relation to the average of 255 he had when his a1c was over 10% a few months ago.  Encouragement given.  We talked about small changes in behavior that would improve things further.  He is going to stop snacking in the evening or at least cut the portions.    His overnight readings are often good but as soon as he starts eating his blood sugar goes up and stays up.  He does not want to start meal time insulin.  Will split the Lantus out to see if we can make any progress that way. He will start Lantus 8 units in the morning,      A Navjot Flash was put on him today and he was shown how to scan it.  Will have him come back in 2 weeks and upload the reader to check progress with additional insulin.      Patient-stated goal written and given to Lucian Oliveira.  Verbalized and demonstrated understanding of instructions.     PLAN:  See patient instructions  AVS printed and given to patient    FOLLOW-UP:    6/27 at 1:30pm    Time spent with patient at today's visit was 60 minutes.      Any diabetes medication dose changes were made via the CDE Protocol and Collaborative Practice Agreement with [place] and  [facility].  A copy of this encounter was provided to patient's referring provider.

## 2018-06-27 ENCOUNTER — OFFICE VISIT (OUTPATIENT)
Dept: EDUCATION SERVICES | Facility: CLINIC | Age: 63
End: 2018-06-27
Payer: MEDICARE

## 2018-06-27 DIAGNOSIS — E11.65 TYPE 2 DIABETES MELLITUS WITH HYPERGLYCEMIA (H): Primary | ICD-10-CM

## 2018-06-27 RX ORDER — FLASH GLUCOSE SENSOR
KIT MISCELLANEOUS
Qty: 3 EACH | Refills: 11 | Status: SHIPPED | OUTPATIENT
Start: 2018-06-27 | End: 2018-08-06

## 2018-06-27 NOTE — PROGRESS NOTES
"DIABETES EDUCATION NOTE:    Lucian Oliveira presents today for education related to Type 2 diabetes.    Patient is being treated with:  oral agents, diet, insulin and SMBG  He is accompanied by self    PATIENT CONCERNS RELATED TO DIABETES SELF MANAGEMENT:   \"Getting my blood sugar down\"    Current Diabetes Management per Patient:  Taking diabetes medications?   yes:     Diabetes Medication(s)     Dipeptidyl Peptidase-4 (DPP-4) Inhibitors Sig    sitagliptin (JANUVIA) 50 MG tablet Take 1 tablet (50 mg) by mouth daily    Insulin Sig    insulin glargine (LANTUS SOLOSTAR) 100 UNIT/ML pen Take 8 units in the morning and 40 units in the evening    LANTUS SOLOSTAR 100 UNIT/ML soln INJECT 40 UNITS UNDER THE SKIN AT BEDTIME (PLEASE MAKE APPOINTMENT FOR FURTHER REFILLS)    Sulfonylureas Sig    glimepiride (AMARYL) 4 MG tablet Take 1 tablet (4 mg) by mouth every morning (before breakfast)        Monitoring  Patient glucose self monitoring as follows: four times daily.   BG meter: Accu-Chek Mely Connect meter  BG results: see blood glucose log attached to this encounter     BG values are: in and out of goal  Patient's most recent   Lab Results   Component Value Date    A1C 9.8 09/06/2017    is not meeting goal of <7.0      Exercise: no regular exercise program    Nutrition:   Patient currently eats 3 meals 1-2 snacks per day, is working on decreasing portion sizes      Hypoglycemia:   Patient is at risk of hypoglycemia? Yes, understands treatment                         EDUCATION and INSTRUCTION PROVIDED AT THIS VISIT:     His Navjot was uploaded and the reports were discussed.  Unfortunately he did not take his Lantus twice a day like we had discussed.  He did find the information the Navjot provided helpful and he could see the effects of some of his foods and that led to smaller portions sizes.  His wife reports he is eating more salads.    His blood sugar is still rising as soon as he starts eating the stays high most of the " day.  They decline mealtime insulin right now.  A 70/30 insulin may be an option if splitting the Lantus does not help.      Will attempt to get the Navjot Flash covered for him through [employer].  Paperwork was sent to [employer] today    Patient-stated goal written and given to Lucian Oliveira.  Verbalized and demonstrated understanding of instructions.     PLAN:  See patient instructions  AVS printed and given to patient    FOLLOW-UP:    1-2 weeks through  line    Time spent with patient at today's visit was 60 minutes.      Any diabetes medication dose changes were made via the CDE Protocol and Collaborative Practice [facility] [place] [facility].  A copy of this encounter was provided to patient's referring provider.

## 2018-06-27 NOTE — PATIENT INSTRUCTIONS
1.  Test your blood sugar morning and evening.    2.  Take Lantus 40 units in the evening and 8 units in the morning.    3.  Expect a call from me regarding a Navjot Flash sensor in the next 1-2 weeks.    4. Interpreters [phone]    Suyapa Dias RN,ANTONYE  [address] [address]  Phone: [phone] or [phone]  [email or web address]

## 2018-06-27 NOTE — MR AVS SNAPSHOT
After Visit Summary   6/27/2018    Lucian Oliveira    MRN: [id number]           Patient Information     Date Of Birth          1955        Visit Information        Provider Department      6/27/2018 1:15 PM Carlita Davies; Suyapa Dias RN [facility] Diabetes        Care Instructions    1.  Test your blood sugar morning and evening.    2.  Take Lantus 40 units in the evening and 8 units in the morning.    3.  Expect a call from me regarding a Navjot Flash sensor in the next week.  4. Interpreters [phone]    Suyapa Dias RN,CDE  [address] [address]  Phone: [phone] or [phone]  [email or web address]              Follow-ups after your visit        Your next 10 appointments already scheduled     Aug 06, 2018  9:30 AM CDT   (Arrive by 9:15 AM)   RETURN DIABETES with Marisabel Prieto PA-C   [facility] Endocrinology ([facility])    [address] [place] [address]  3rd [facility] [address] [phone]            Oct 22, 2018  2:00 PM CDT   Lab with [facility]   [facility] Lab ([facility])    [address] [place] [address] [phone]            Oct 22, 2018  2:30 PM CDT   (Arrive by 2:15 PM)   Implanted Defibulator with Uc Cv Device 1   [facility] ([facility])    [address] [place] [address]  [address] [place] [address] [phone]            Oct 22, 2018  3:00 PM CDT   (Arrive by 2:45 PM)   RETURN HEART FAILURE with Arvin Sheridan MD   [facility] ([facility])    [address] [place] [address]  [address] [place] [address] [phone]              Who to contact     Please call your clinic at [phone] to:    Ask questions about your health    Make or cancel appointments    Discuss your medicines    Learn about your test results    Speak to your doctor            Additional Information About Your  Visit        [employer] Information     [employer] gives you secure access to your electronic health record. If you see a primary care provider, you can also send messages to your care team and make appointments. If you have questions, please call your primary care clinic.  If you do not have a primary care provider, please call [phone] and they will assist you.      [employer] is an electronic gateway that provides easy, online access to your medical records. With [employer], you can request a clinic appointment, read your test results, renew a prescription or communicate with your care team.     To access your existing account, please contact your [facility] or call [phone] for assistance.        Care EveryWhere ID     This is your Care EveryWhere ID. This could be used by other organizations to access your [place] medical records  [id number]         Blood Pressure from Last 3 Encounters:   04/30/18 105/65   04/30/18 116/74   09/27/17 109/65    Weight from Last 3 Encounters:   04/30/18 89.4 kg (197 lb)   04/30/18 89.4 kg (197 lb 1.6 oz)   10/13/17 88 kg (194 lb)              Today, you had the following     No orders found for display       Primary Care Provider Office Phone # Fax #    Anna Archuleta PA-C [phone]       [address] [place] [address]        Equal Access to Services     [facility]: Hadii aad ku hadasho Soomaali, waaxda luqadaha, qaybta kaalmada arpitayada, joana graves . So [facility] [phone].    ATENCIÓN: Si habla español, tiene a lainez disposición servicios gratuitos de asistencia lingüística. Loraame al [phone].    We comply with applicable federal civil rights laws and [place] laws. We do not discriminate on the basis of race, color, national origin, age, disability, sex, sexual orientation, or gender identity.            Thank you!     Thank you for choosing [facility] DIABETES  for your care. Our goal  is always to provide you with excellent care. Hearing back from our patients is one way we can continue to improve our services. Please take a few minutes to complete the written survey that you may receive in the mail after your visit with us. Thank you!             Your Updated Medication List - Protect others around you: Learn how to safely use, store and throw away your medicines at www.disposemymeds.org.          This list is accurate as of 6/27/18  1:59 PM.  Always use your most recent med list.                   Brand Name Dispense Instructions for use Diagnosis    aspirin 81 MG tablet     30 tablet    Take 1 tablet (81 mg) by mouth daily    Ischemic cardiomyopathy       atorvastatin 40 MG tablet    LIPITOR    30 tablet    TAKE ONE TABLET BY MOUTH EVERY DAY    Chronic systolic heart failure (H), Wheezing, CKD (chronic kidney disease) stage 3, GFR 30-59 ml/min, CHF (NYHA class II, ACC/AHA stage C) (H)       B-D U/F 31G X 5 MM   Generic drug:  insulin pen needle     30 each    USE 1 NEEDLE DAILY AS DIRECTED    Type 2 diabetes mellitus with diabetic nephropathy, with long-term current use of insulin (H)       blood glucose monitoring meter device kit     1 kit    Use to test blood sugar 3-4 times daily, as directed.    Type 2 diabetes mellitus with diabetic nephropathy, with long-term current use of insulin (H)       carvedilol 25 MG tablet    COREG    180 tablet    Take 1 tablet (25 mg) by mouth 2 times daily (with meals)    Hypertension goal BP (blood pressure) < 140/90       clopidogrel 75 MG tablet    PLAVIX    30 tablet    TAKE ONE TABLET BY MOUTH EVERY DAY    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 (H)       furosemide 40 MG tablet    LASIX    180 tablet    Take 1 tablet (40 mg) by mouth daily - Take an extra 40mg as needed if weight goes up 2 pounds overnight, or more than 5 pounds in 1 week.    CHF (NYHA class II, ACC/AHA stage C) (H)        glimepiride 4 MG tablet    AMARYL    180 tablet    Take 1 tablet (4 mg) by mouth every morning (before breakfast)    Type 2 diabetes mellitus with hyperglycemia, with long-term current use of insulin (H)       isosorbide mononitrate 30 MG 24 hr tablet    IMDUR    45 tablet    TAKE ONE AND ONE-HALF TABLETS BY MOUTH ONCE DAILY    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 (H)       * LANTUS SOLOSTAR 100 UNIT/ML injection   Generic drug:  insulin glargine     15 mL    INJECT 40 UNITS UNDER THE SKIN AT BEDTIME (PLEASE MAKE APPOINTMENT FOR FURTHER REFILLS)    Type 2 diabetes mellitus with diabetic nephropathy, with long-term current use of insulin (H)       * insulin glargine 100 UNIT/ML injection    LANTUS SOLOSTAR    15 mL    Take 8 units in the morning and 40 units in the evening    Type 2 diabetes mellitus with diabetic nephropathy, with long-term current use of insulin (H)       losartan 50 MG tablet    COZAAR    30 tablet    TAKE ONE TABLET BY MOUTH EVERY DAY    CKD (chronic kidney disease) stage 3, GFR 30-59 ml/min, CHF (NYHA class II, ACC/AHA stage C) (H), Chronic systolic heart failure (H), Wheezing       nitroGLYcerin 0.4 MG sublingual tablet    NITROSTAT    10 tablet    Place 1 tablet (0.4 mg) under the tongue every 5 minutes as needed for chest pain If you are still having symptoms after 3 doses (15 minutes) call 911.    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 (H)       * ONETOUCH ULTRA test strip   Generic drug:  blood glucose monitoring     100 each    USE TO TEST BLOOD SUGAR TWO TIMES A DAY OR AS DIRECTED    Type 2 diabetes mellitus with diabetic nephropathy, with long-term current use of insulin (H)       * blood glucose monitoring test strip    ONETOUCH ULTRA    100 each    Use to test blood sugar 3 times daily or as directed.    Type 2 diabetes mellitus with diabetic nephropathy, with  long-term current use of insulin (H)       order for DME     1 Units    Auto CPAP 8-15 cmH20        sitagliptin 50 MG tablet    JANUVIA    90 tablet    Take 1 tablet (50 mg) by mouth daily    Type 2 diabetes mellitus with hyperglycemia, with long-term current use of insulin (H), Type 2 diabetes mellitus with stage 3 chronic kidney disease, with long-term current use of insulin (H)       * Notice:  This list has 4 medication(s) that are the same as other medications prescribed for you. Read the directions carefully, and ask your doctor or other care provider to review them with you.

## 2018-07-30 ENCOUNTER — ALLIED HEALTH/NURSE VISIT (OUTPATIENT)
Dept: CARDIOLOGY | Facility: CLINIC | Age: 63
End: 2018-07-30
Attending: INTERNAL MEDICINE
Payer: MEDICARE

## 2018-07-30 DIAGNOSIS — I50.22 CHRONIC SYSTOLIC HEART FAILURE (H): Primary | Chronic | ICD-10-CM

## 2018-07-30 PROCEDURE — 93295 DEV INTERROG REMOTE 1/2/MLT: CPT | Performed by: INTERNAL MEDICINE

## 2018-07-30 PROCEDURE — 93296 REM INTERROG EVL PM/IDS: CPT | Mod: ZF

## 2018-07-30 NOTE — MR AVS SNAPSHOT
After Visit Summary   7/30/2018    Lucian Oliveira    MRN: [id number]           Patient Information     Date Of Birth          1955        Visit Information        Provider Department      7/30/2018 6:00 AM UC ICD REMOTE [facility]        Today's Diagnoses     Chronic systolic heart failure (H)    -  1       Follow-ups after your visit        Your next 10 appointments already scheduled     Aug 16, 2018  7:40 AM CDT   PHYSICAL with Anna Archuleta PA-C   [facility] ([facility])    [address]  [place] [address] [phone]            Aug 23, 2018 11:30 AM CDT   (Arrive by 11:15 AM)   Office Visit with Suyapa Dias RN   [facility] Diabetes ([facility])    [address] [place] [address]  3rd [facility] [address] [phone]           Bring a current list of meds and any records pertaining to this visit. For Physicals, please bring immunization records and any forms needing to be filled out. Please arrive 10 minutes early to complete paperwork.            Oct 22, 2018  2:00 PM CDT   Lab with [facility]    Health Lab ([facility])    [address] [place] [address]  1st Floor  [facility] [address] [phone]            Oct 22, 2018  2:30 PM CDT   (Arrive by 2:15 PM)   Implanted Defibulator with  Cv Device 1   [facility] Heart Care ([facility])    [address] [place] [address]  [address] [place] [address] [phone]            Oct 22, 2018  3:00 PM CDT   (Arrive by 2:45 PM)   RETURN HEART FAILURE with Arvin Sheridan MD   [facility] Heart Care ([facility])    [address] [place] [address]  [address] [place] [address] [phone]              Who to contact     If you have questions or need follow up information about today's clinic visit or your schedule please contact [facility] directly at [phone].  Normal or  non-critical lab and imaging results will be communicated to you by MyChart, letter or phone within 4 business days after the clinic has received the results. If you do not hear from us within 7 days, please contact the clinic through [employer] or phone. If you have a critical or abnormal lab result, we will notify you by phone as soon as possible.  Submit refill requests through [employer] or call your pharmacy and they will forward the refill request to us. Please allow 3 business days for your refill to be completed.          Additional Information About Your Visit        [employer] Information     [employer] gives you secure access to your electronic health record. If you see a primary care provider, you can also send messages to your care team and make appointments. If you have questions, please call your primary care clinic.  If you do not have a primary care provider, please call [phone] and they will assist you.        Care EveryWhere ID     This is your Care EveryWhere ID. This could be used by other organizations to access your [place] medical records  [id number]         Blood Pressure from Last 3 Encounters:   08/06/18 110/71   04/30/18 105/65   04/30/18 116/74    Weight from Last 3 Encounters:   08/06/18 91.5 kg (201 lb 11.2 oz)   04/30/18 89.4 kg (197 lb)   04/30/18 89.4 kg (197 lb 1.6 oz)              We Performed the Following     INTERROGATION DEVICE EVAL REMOTE, PACER/ICD        Primary Care Provider Office Phone # Fax #    Anna Archuleta PA-C [phone]       [address] [place] [address]        Equal Access to Services     [facility]: Hadii aad ku hadasho Soomaali, waaxda luqadaha, qaybta kaalmada adeegyada, joana graves . So [facility] [phone].    ATENCIÓN: Si habla español, tiene a lainez disposición servicios gratuitos de asistencia lingüística. Llame al [phone].    We comply with applicable federal civil rights laws and [place] laws.  We do not discriminate on the basis of race, color, national origin, age, disability, sex, sexual orientation, or gender identity.            Thank you!     Thank you for choosing [facility]  for your care. Our goal is always to provide you with excellent care. Hearing back from our patients is one way we can continue to improve our services. Please take a few minutes to complete the written survey that you may receive in the mail after your visit with us. Thank you!             Your Updated Medication List - Protect others around you: Learn how to safely use, store and throw away your medicines at www.disposemymeds.org.          This list is accurate as of 7/30/18 11:59 PM.  Always use your most recent med list.                   Brand Name Dispense Instructions for use Diagnosis    aspirin 81 MG tablet     30 tablet    Take 1 tablet (81 mg) by mouth daily    Ischemic cardiomyopathy       atorvastatin 40 MG tablet    LIPITOR    30 tablet    TAKE ONE TABLET BY MOUTH EVERY DAY    Chronic systolic heart failure (H), Wheezing, CKD (chronic kidney disease) stage 3, GFR 30-59 ml/min, CHF (NYHA class II, ACC/AHA stage C) (H)       B-D U/F 31G X 5 MM   Generic drug:  insulin pen needle     30 each    USE 1 NEEDLE DAILY AS DIRECTED    Type 2 diabetes mellitus with diabetic nephropathy, with long-term current use of insulin (H)       blood glucose monitoring meter device kit     1 kit    Use to test blood sugar 3-4 times daily, as directed.    Type 2 diabetes mellitus with diabetic nephropathy, with long-term current use of insulin (H)       carvedilol 25 MG tablet    COREG    180 tablet    Take 1 tablet (25 mg) by mouth 2 times daily (with meals)    Hypertension goal BP (blood pressure) < 140/90       clopidogrel 75 MG tablet    PLAVIX    30 tablet    TAKE ONE TABLET BY MOUTH EVERY DAY    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 (H)       furosemide 40 MG  tablet    LASIX    180 tablet    Take 1 tablet (40 mg) by mouth daily - Take an extra 40mg as needed if weight goes up 2 pounds overnight, or more than 5 pounds in 1 week.    CHF (NYHA class II, ACC/AHA stage C) (H)       glimepiride 4 MG tablet    AMARYL    180 tablet    Take 1 tablet (4 mg) by mouth every morning (before breakfast)    Type 2 diabetes mellitus with hyperglycemia, with long-term current use of insulin (H)       isosorbide mononitrate 30 MG 24 hr tablet    IMDUR    45 tablet    TAKE ONE AND ONE-HALF TABLETS BY MOUTH ONCE DAILY    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 (H)       * LANTUS SOLOSTAR 100 UNIT/ML injection   Generic drug:  insulin glargine     15 mL    INJECT 40 UNITS UNDER THE SKIN AT BEDTIME (PLEASE MAKE APPOINTMENT FOR FURTHER REFILLS)    Type 2 diabetes mellitus with diabetic nephropathy, with long-term current use of insulin (H)       * insulin glargine 100 UNIT/ML injection    LANTUS SOLOSTAR    15 mL    Take 8 units in the morning and 40 units in the evening    Type 2 diabetes mellitus with diabetic nephropathy, with long-term current use of insulin (H)       losartan 50 MG tablet    COZAAR    30 tablet    TAKE ONE TABLET BY MOUTH EVERY DAY    CKD (chronic kidney disease) stage 3, GFR 30-59 ml/min, CHF (NYHA class II, ACC/AHA stage C) (H), Chronic systolic heart failure (H), Wheezing       nitroGLYcerin 0.4 MG sublingual tablet    NITROSTAT    10 tablet    Place 1 tablet (0.4 mg) under the tongue every 5 minutes as needed for chest pain If you are still having symptoms after 3 doses (15 minutes) call 911.    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 (H)       * ONETOUCH ULTRA test strip   Generic drug:  blood glucose monitoring     100 each    USE TO TEST BLOOD SUGAR TWO TIMES A DAY OR AS DIRECTED    Type 2 diabetes mellitus with diabetic nephropathy, with  long-term current use of insulin (H)       * blood glucose monitoring test strip    ONETOUCH ULTRA    100 each    Use to test blood sugar 3 times daily or as directed.    Type 2 diabetes mellitus with diabetic nephropathy, with long-term current use of insulin (H)       order for DME     1 Units    Auto CPAP 8-15 cmH20        sitagliptin 50 MG tablet    JANUVIA    90 tablet    Take 1 tablet (50 mg) by mouth daily    Type 2 diabetes mellitus with hyperglycemia, with long-term current use of insulin (H), Type 2 diabetes mellitus with stage 3 chronic kidney disease, with long-term current use of insulin (H)       * Notice:  This list has 4 medication(s) that are the same as other medications prescribed for you. Read the directions carefully, and ask your doctor or other care provider to review them with you.

## 2018-08-06 ENCOUNTER — OFFICE VISIT (OUTPATIENT)
Dept: ENDOCRINOLOGY | Facility: CLINIC | Age: 63
End: 2018-08-06
Payer: MEDICARE

## 2018-08-06 VITALS
BODY MASS INDEX: 33.61 KG/M2 | HEIGHT: 65 IN | WEIGHT: 201.7 LBS | HEART RATE: 73 BPM | SYSTOLIC BLOOD PRESSURE: 110 MMHG | DIASTOLIC BLOOD PRESSURE: 71 MMHG

## 2018-08-06 DIAGNOSIS — Z79.4 TYPE 2 DIABETES MELLITUS WITH HYPERGLYCEMIA, WITH LONG-TERM CURRENT USE OF INSULIN (H): ICD-10-CM

## 2018-08-06 DIAGNOSIS — N18.30 CHRONIC KIDNEY DISEASE, STAGE 3 (MODERATE): Primary | ICD-10-CM

## 2018-08-06 DIAGNOSIS — E11.21 TYPE 2 DIABETES MELLITUS WITH DIABETIC NEPHROPATHY, WITH LONG-TERM CURRENT USE OF INSULIN (H): Chronic | ICD-10-CM

## 2018-08-06 DIAGNOSIS — R06.2 WHEEZING: ICD-10-CM

## 2018-08-06 DIAGNOSIS — N18.30 CKD (CHRONIC KIDNEY DISEASE) STAGE 3, GFR 30-59 ML/MIN (H): ICD-10-CM

## 2018-08-06 DIAGNOSIS — I50.9 CHF (NYHA CLASS II, ACC/AHA STAGE C) (H): ICD-10-CM

## 2018-08-06 DIAGNOSIS — E11.65 TYPE 2 DIABETES MELLITUS WITH HYPERGLYCEMIA, WITH LONG-TERM CURRENT USE OF INSULIN (H): ICD-10-CM

## 2018-08-06 DIAGNOSIS — I25.739 CORONARY ARTERY DISEASE INVOLVING NONAUTOLOGOUS BIOLOGICAL CORONARY BYPASS GRAFT WITH ANGINA PECTORIS (H): ICD-10-CM

## 2018-08-06 DIAGNOSIS — I50.22 CHRONIC SYSTOLIC HEART FAILURE (H): ICD-10-CM

## 2018-08-06 DIAGNOSIS — Z79.4 TYPE 2 DIABETES MELLITUS WITH DIABETIC NEPHROPATHY, WITH LONG-TERM CURRENT USE OF INSULIN (H): Chronic | ICD-10-CM

## 2018-08-06 DIAGNOSIS — F40.298 NEEDLE PHOBIA: ICD-10-CM

## 2018-08-06 LAB — HBA1C MFR BLD: 8.3 % (ref 4.3–6)

## 2018-08-06 RX ORDER — INSULIN ASPART 100 [IU]/ML
INJECTION, SOLUTION INTRAVENOUS; SUBCUTANEOUS
Qty: 3 ML | Refills: 3 | Status: SHIPPED | OUTPATIENT
Start: 2018-08-06 | End: 2018-10-16

## 2018-08-06 RX ORDER — FLASH GLUCOSE SENSOR
KIT MISCELLANEOUS
Qty: 9 EACH | Refills: 3 | Status: SHIPPED | OUTPATIENT
Start: 2018-08-06 | End: 2019-05-07

## 2018-08-06 NOTE — PATIENT INSTRUCTIONS
Es un gusto concerles a Uds hoy najma.  Lastimosamente a pesar de mucho trabajo, lainez enfermedaa vargas progresado y parece la mejor cosa para controlar lainez diabetes,  es tener insulina de corta accion con las comidas.    Vamos empezar con Novolog 10 unidades en la jesusita.      Vamos a poner Navjot pro para caesar halie ayudah and tambien intentar conseguirse lainez propio Navjot Flash aparato para chequer lainez azucar seguido.    Ideal:  En ayunas: 85- 140  Antes de las comidas: 90 - 140  AL dormir: 90 - 180     My best wishes,    Marisabel Prieto PA-C, MPAS  [facility]  Diabetes, Endocrinology, and Metabolism  [phone] Appointments/Nurse  [phone] nurse line  [phone] URGENTafter hours/weekend Endocrinologist on call

## 2018-08-06 NOTE — LETTER
8/6/2018       RE: Lucian Oliveira  [address] [address]     Dear Colleague,    Thank you for referring your patient, Lucian Oliveira, to the [facility] ENDOCRINOLOGY at [facility]. Please see a copy of my visit note below.    [facility]  Endocrinology  Marisabel Prieto PA-C  08/06/2018      Chief Complaint:   Diabetes    History of Present Illness:   Lucian Oliveira is a 62 year old male with a history of CAD, type II diabetes mellitus, hypertension, CKD stage 3 and CHANTEL who presents with his wife and a  for DM2 follow-up. He is an immigrant from [place] in 1971. Diagnosed DM2 at age 45, diagnosed by PMD by regular check up. Prescribed oral meds but not much taking and only with diet exercise. Insulin use was started over 1 year ago, Lantus was started with 15 units and now 40 units insulin at night. Used to take Metformin and was discontinued due to GFR.     He has been seeing multiple providers in diabetes education. They are working to lower his blood sugar, he is currently using Januvia 50 mg daily, Lantus 8 units in the AM and 40 units in the PM, and glimepiride 4 mg prior to breakfast.     Today, he reports his diabetic control is okay. A1c of 8.3%. He is not checking his blood sugar often as he is afraid of the needle. He has been exercising at the gym by using the bike, treadmill and lifting weights. He goes to the gym 3-4 days per week. This exercise is causing back pain, he previously injured his back a few years ago for which he had PT for at the time. He is open to starting PT again.    He is also having difficulties with insurance coverage for his sensors and he has not been checking his BG as he is fearful of it and does not tolerate the pain.  He is able to endure his Lantus injections twice daily.    Other concerns discussed:  1. His weight is increasing in recent months though stable over the last year. He reports eating less  and trying to lose weight by eating less salt and carbohydrates. He eats sandwiches, but does not consume beer, juice or cigarettes.     Blood Glucose Monitoring:  We reviewed glucometer and Navjot Pro CGM data together.  # of tests: 8  Avg of 130 +/- 34.2  Ranging form , most are checked fasting  Hypoglycemia:  He denies problems with hypoglycemia reporting a few sugars of 87, 137 in the evening, and 140 midday    BG Goal:  Fastin-140  Before meals:   Before bed:     Diabetes monitoring and complications:  CAD: Yes  Last eye exam results: : not discussed today.  Last dental exam: not discussed today.  Microalbuminuria: 44 on 17.  HTN: Yes  On Statin: Yes  On Aspirin: Yes  Depression: No    Review of Systems:   Pertinent items are noted in HPI.  All other systems are negative.    Active Medications:      aspirin 81 MG tablet, Take 1 tablet (81 mg) by mouth daily, Disp: 30 tablet, Rfl: 11     atorvastatin (LIPITOR) 40 MG tablet, TAKE ONE TABLET BY MOUTH EVERY DAY, Disp: 30 tablet, Rfl: 0     B-D U/F insulin pen needle, USE 1 NEEDLE DAILY AS DIRECTED, Disp: 30 each, Rfl: 0     carvedilol (COREG) 25 MG tablet, Take 1 tablet (25 mg) by mouth 2 times daily (with meals), Disp: 180 tablet, Rfl: 3     clopidogrel (PLAVIX) 75 MG tablet, TAKE ONE TABLET BY MOUTH EVERY DAY, Disp: 30 tablet, Rfl: 0     continuous blood glucose monitoring (FREESTYLE NAVJOT) sensor, For use with Freestyle Navjot Flash  for continuous monitoring of blood glucose levels. Replace sensor every 10 days., Disp: 3 each, Rfl: 11     furosemide (LASIX) 40 MG tablet, Take 1 tablet (40 mg) by mouth daily - Take an extra 40mg as needed if weight goes up 2 pounds overnight, or more than 5 pounds in 1 week., Disp: 180 tablet, Rfl: 3     glimepiride (AMARYL) 4 MG tablet, Take 1 tablet (4 mg) by mouth every morning (before breakfast), Disp: 180 tablet, Rfl: 3     insulin glargine (LANTUS SOLOSTAR) 100 UNIT/ML pen, Take 8 units in  "the morning and 40 units in the evening, Disp: 15 mL, Rfl: 11     isosorbide mononitrate (IMDUR) 30 MG 24 hr tablet, TAKE ONE AND ONE-HALF TABLETS BY MOUTH ONCE DAILY, Disp: 45 tablet, Rfl: 0     LANTUS SOLOSTAR 100 UNIT/ML soln, INJECT 40 UNITS UNDER THE SKIN AT BEDTIME (PLEASE MAKE APPOINTMENT FOR FURTHER REFILLS), Disp: 15 mL, Rfl: 0     losartan (COZAAR) 50 MG tablet, TAKE ONE TABLET BY MOUTH EVERY DAY, Disp: 30 tablet, Rfl: 0     nitroglycerin (NITROSTAT) 0.4 MG SL tablet, Place 1 tablet (0.4 mg) under the tongue every 5 minutes as needed for chest pain If you are still having symptoms after 3 doses (15 minutes) call 911. (Patient not taking: Reported on 4/30/2018), Disp: 10 tablet, Rfl: 0     sitagliptin (JANUVIA) 50 MG tablet, Take 1 tablet (50 mg) by mouth daily, Disp: 90 tablet, Rfl: 5      Allergies:   No known drug allergies      Past Medical History:  CAD  Type II diabetes mellitus  Hyperlipidemia  Hypertension  CHF  CKD stage 3  Automatic implantable cardioverter-defibrillator  Chronic systolic heart failure  proteinuria  Vitamin D deficiency  OA of knee  Chondromalacia of patella  Tinnitus   Ptosis of right eyelid  Hyperparathyroidism, renal  Cortical cataracts, bilateral  Diabetic retinopathy  CHANTEL      Past Surgical History:  C CABG, artery-vein, three  Implant automatic implantable cardioverter defibrillator     Family History:   Brother: diabetes  Sister(s): diabetes      Social History:   The patient is  with 4 children, a nonsmoker and rarely consumes alcohol.     Physical Exam:   /71  Pulse 73  Ht 1.64 m (5' 4.57\")  Wt 91.5 kg (201 lb 11.2 oz)  BMI 34.02 kg/m2     Wt Readings from Last 10 Encounters:   08/06/18 91.5 kg (201 lb 11.2 oz)   04/30/18 89.4 kg (197 lb)   04/30/18 89.4 kg (197 lb 1.6 oz)   10/13/17 88 kg (194 lb)   09/27/17 89.1 kg (196 lb 6.4 oz)   09/19/17 89.7 kg (197 lb 12.8 oz)   09/14/17 89.4 kg (197 lb)   09/06/17 88.5 kg (195 lb)   07/05/17 89.4 kg (197 lb 3.2 " "oz)   06/05/17 90.3 kg (199 lb)   General: Pleasant, well nourished and hydrated male in NAD accompanied by his wife.   was available if needed for any clarifications.    Psych:  Mood is \"good,\" affect is appropriate.  Thought form and content are fluid and coherent.    HEENT: Eyes and sclera are clear. Extraocular movements are grossly intact without proptosis.  Nares are patent, mucous membranes moist.  Neck: No masses or JVD are noted.    Resp: Easy and unlabored breathing.   Neuro: Alert and oriented, communicating clearly.   Ext: no swelling or edema      Data:  Lab Results   Component Value Date     05/04/2018    POTASSIUM 4.8 05/04/2018    CHLORIDE 106 05/04/2018    CO2 26 05/04/2018    ANIONGAP 6 05/04/2018     (H) 05/04/2018    BUN 38 (H) 05/04/2018    CR 1.96 (H) 05/04/2018    BRYANNA 8.9 05/04/2018     Lab Results   Component Value Date    GFRESTIMATED 35 (L) 05/04/2018    GFRESTIMATED 36 (L) 04/30/2018    GFRESTIMATED 39 (L) 09/13/2017    GFRESTBLACK 42 (L) 05/04/2018    GFRESTBLACK 44 (L) 04/30/2018    GFRESTBLACK 47 (L) 09/13/2017      Lab Results   Component Value Date    MICROL 44 04/27/2017    UMALCR 39.38 (H) 04/27/2017     Lab Results   Component Value Date    A1C 9.8 (H) 09/06/2017    A1C 9.1 (H) 07/05/2017    A1C 10.5 (H) 04/27/2017    A1C 9.8 (H) 02/07/2017    A1C 10.7 (H) 01/05/2017    HEMOGLOBINA1 10.6 (A) 04/30/2018     No results found for: CPEPT, GADAB, ISCAB    Lab Results   Component Value Date    CHOL 93 07/05/2017    CHOL 99 07/06/2016    TRIG 226 (H) 07/05/2017    TRIG 225 (H) 07/06/2016    HDL 26 (L) 07/05/2017    HDL 31 (L) 07/06/2016    LDL 22 07/05/2017    LDL 23 07/06/2016    NHDL 67 07/05/2017    NHDL 68 07/06/2016     Assessment and Plan:  Type 2 diabetes mellitus with diabetic nephropathy (H)  Lucian Oliveira is a 61 yo male with history of CAD, type II diabetes mellitus, hypertension, CKD stage 3 and CHANTEL for a follow-up. His blood glucose levels " remain above target, especially following meals, especially dinner,  per recent Navjot pro download.    One might consider empagliflozin as GFR is >30, but mealtime insulin is likely inevitable and likely lower risk though he will need CGM as unable to check fingerstick reliably.  We discussed the possibility of V-GO device and he and wife are interested in this option.  I think he would require less insulin  With this and be able to maintain or lose weight more successfully with on demand dosing that involves less injections, especially is paired with CGM.  I suspect his Lantus dose is excessive and should come down once mealtime insulin is started.  I suspect he will do well on V-40 though V-30 may be more sufficient and would be advised if so.       For now, he will start using short-acting insulin, Novolog 10 units, with dinner; I suspect will need with lunch as well.  He will see diabetes education in regards to possible V-GO transition.   - Hemoglobin A1c POCT  - NOVOLOG FLEXPEN 100 UNIT/ML soln  Dispense: 3 mL; Refill: 3  - continuous blood glucose monitoring (FREESTYLE NAVJOT) sensor  Dispense: 9 each; Refill: 3     Follow-up: Return in about 2-4 weeks..     >50% of 45 minute visit spent in face to face counseling, education and coordination of care related to options for better glycemic control to prevent further complications as well as preventing, detecting, and treating hypoglycemia.      My best wishes,    Marisabel Prieto PA-C, MPAS  [facility]  Diabetes, Endocrinology, and Metabolism  [phone] Appointments/Nurse  [phone] nurse line  [phone] URGENTafter hours/weekend Endocrinologist on call        Scribe Disclosure:   I, Dacia Mills, am serving as a scribe to document services personally performed by Marisabel Prieto PA-C at this visit, based upon the provider's statements to me. All documentation has been reviewed by the aforementioned provider prior to being  entered into the official medical record.     Portions of this medical record were completed by a scribe. UPON MY REVIEW AND AUTHENTICATION BY ELECTRONIC SIGNATURE, this confirms (a) I performed the applicable clinical services, and (b) the record is accurate.      Again, thank you for allowing me to participate in the care of your patient.      Sincerely,    Marisabel Prieto PA-C

## 2018-08-06 NOTE — MR AVS SNAPSHOT
After Visit Summary   8/6/2018    Lucian Oliveira    MRN: [id number]           Patient Information     Date Of Birth          1955        Visit Information        Provider Department      8/6/2018 9:30 AM Gladys Galaviz; Marisabel Prieto PA-C [facility] Endocrinology        Today's Diagnoses     Type 2 diabetes mellitus with diabetic nephropathy (H)    -  1    Chronic kidney disease, stage 3 (moderate)        Type 2 diabetes mellitus with hyperglycemia, with long-term current use of insulin (H)          Care Instructions    Es un gusto concerles a Uds hoy najma.  Lastimosamente a pesar de mucho trabajo, lainez enfermedaa vargas progresado y parece la mejor cosa para controlar lainez diabetes,  es tener insulina de corta accion con las comidas.    Vamos empezar con Novolog 10 unidades en la jesusita.      Vamos a poner Navjot pro para caesar halie ayudah and tambien intentar conseguirse lainez propio Navjot Flash aparato para chequer lainez azucar seguido.    Ideal:  En ayunas: 85- 140  Antes de las comidas: 90 - 140  AL dormir: 90 - 180     My best wishes,    Marisabel Preito PA-C, [facility]  [facility]  Diabetes, Endocrinology, and Metabolism  [phone] Appointments/Nurse  [phone] nurse line  [phone] URGENTafter hours/weekend Endocrinologist on call              Follow-ups after your visit        Follow-up notes from your care team     Return in about 4 weeks (around 9/3/2018).      Your next 10 appointments already scheduled     Aug 23, 2018 11:30 AM CDT   (Arrive by 11:15 AM)   Office Visit with Suyapa Dias RN   [facility] ([facility])    [address] [address] [phone]           Bring a current list of meds and any records pertaining to this visit. For Physicals, please bring immunization records and any forms needing to be filled out. Please arrive 10 minutes early to complete paperwork.            Oct 22, 2018  2:00  "PM CDT   Lab with UC LAB   [facility] ([facility])    [address]  1st Floor  [facility] [id number] [phone]            Oct 22, 2018  2:30 PM CDT   (Arrive by 2:15 PM)   Implanted Defibulator with Uc Cv Device 1   [facility] ([facility])    [address]  [address]  [facility] [id number] [phone]            Oct 22, 2018  3:00 PM CDT   (Arrive by 2:45 PM)   RETURN HEART FAILURE with Arvin Sheridan MD   [facility] ([facility])    [address]  [address]  [facility] [address] [phone]              Who to contact     Please call your clinic at [phone] to:    Ask questions about your health    Make or cancel appointments    Discuss your medicines    Learn about your test results    Speak to your doctor            Additional Information About Your Visit        [employer] Information     [employer] gives you secure access to your electronic health record. If you see a primary care provider, you can also send messages to your care team and make appointments. If you have questions, please call your primary care clinic.  If you do not have a primary care provider, please call [phone] and they will assist you.      [employer] is an electronic gateway that provides easy, online access to your medical records. With [employer], you can request a clinic appointment, read your test results, renew a prescription or communicate with your care team.     To access your existing account, please contact your [facility] or call [phone] for assistance.        Care EveryWhere ID     This is your Care EveryWhere ID. This could be used by other organizations to access your [place] medical records  [id number]        Your Vitals Were     Pulse Height BMI (Body Mass Index)             73 1.64 m (5' 4.57\") 34.02 kg/m2          Blood Pressure from Last 3 " Encounters:   08/06/18 110/71   04/30/18 105/65   04/30/18 116/74    Weight from Last 3 Encounters:   08/06/18 91.5 kg (201 lb 11.2 oz)   04/30/18 89.4 kg (197 lb)   04/30/18 89.4 kg (197 lb 1.6 oz)              We Performed the Following     Hemoglobin A1c POCT          Today's Medication Changes          These changes are accurate as of 8/6/18 10:57 AM.  If you have any questions, ask your nurse or doctor.               Start taking these medicines.        Dose/Directions    NovoLOG FLEXPEN 100 UNIT/ML injection   Used for:  Type 2 diabetes mellitus with diabetic nephropathy (H)   Generic drug:  insulin aspart   Started by:  Marisabel Prieto PA-C        10 units before lunch, 10 units before dinner   Quantity:  3 mL   Refills:  3            Where to get your medicines      These medications were sent to [facility] - [place], [place] - [facility] AT [facility] [address]  [address], [facility] [address]     Phone:  [phone]     continuous blood glucose monitoring sensor    NovoLOG FLEXPEN 100 UNIT/ML injection                Primary Care Provider Office Phone # Fax #    Anna Archuleta PA-C [phone]       [address] [place] [address]        Equal Access to Services     TAMMY RUSH : Hadii aad ku hadasho Soomaali, waaxda luqadaha, qaybta kaalmada adeegyada, joana pulido. So [facility] [phone].    ATENCIÓN: Si habla español, tiene a lainez disposición servicios gratuitos de asistencia lingüística. Damon al [phone].    We comply with applicable federal civil rights laws and [place] laws. We do not discriminate on the basis of race, color, national origin, age, disability, sex, sexual orientation, or gender identity.            Thank you!     Thank you for choosing [facility] ENDOCRINOLOGY  for your care. Our goal is always to provide you with excellent care. Hearing back from our patients is one way we can continue to  improve our services. Please take a few minutes to complete the written survey that you may receive in the mail after your visit with us. Thank you!             Your Updated Medication List - Protect others around you: Learn how to safely use, store and throw away your medicines at www.disposemymeds.org.          This list is accurate as of 8/6/18 10:57 AM.  Always use your most recent med list.                   Brand Name Dispense Instructions for use Diagnosis    aspirin 81 MG tablet     30 tablet    Take 1 tablet (81 mg) by mouth daily    Ischemic cardiomyopathy       atorvastatin 40 MG tablet    LIPITOR    30 tablet    TAKE ONE TABLET BY MOUTH EVERY DAY    Chronic systolic heart failure (H), Wheezing, CKD (chronic kidney disease) stage 3, GFR 30-59 ml/min, CHF (NYHA class II, ACC/AHA stage C) (H)       B-D U/F 31G X 5 MM   Generic drug:  insulin pen needle     30 each    USE 1 NEEDLE DAILY AS DIRECTED    Type 2 diabetes mellitus with diabetic nephropathy, with long-term current use of insulin (H)       blood glucose monitoring meter device kit     1 kit    Use to test blood sugar 3-4 times daily, as directed.    Type 2 diabetes mellitus with diabetic nephropathy, with long-term current use of insulin (H)       carvedilol 25 MG tablet    COREG    180 tablet    Take 1 tablet (25 mg) by mouth 2 times daily (with meals)    Hypertension goal BP (blood pressure) < 140/90       clopidogrel 75 MG tablet    PLAVIX    30 tablet    TAKE ONE TABLET BY MOUTH EVERY DAY    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 (H)       continuous blood glucose monitoring sensor     9 each    For use with Freestyle Navjot Flash  for continuous monitioring of blood glucose levels. Replace sensor every 10 days.    Type 2 diabetes mellitus with hyperglycemia, with long-term current use of insulin (H)       furosemide 40 MG tablet    LASIX    180 tablet    Take 1 tablet (40  mg) by mouth daily - Take an extra 40mg as needed if weight goes up 2 pounds overnight, or more than 5 pounds in 1 week.    CHF (NYHA class II, ACC/AHA stage C) (H)       glimepiride 4 MG tablet    AMARYL    180 tablet    Take 1 tablet (4 mg) by mouth every morning (before breakfast)    Type 2 diabetes mellitus with hyperglycemia, with long-term current use of insulin (H)       isosorbide mononitrate 30 MG 24 hr tablet    IMDUR    45 tablet    TAKE ONE AND ONE-HALF TABLETS BY MOUTH ONCE DAILY    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 (H)       * LANTUS SOLOSTAR 100 UNIT/ML injection   Generic drug:  insulin glargine     15 mL    INJECT 40 UNITS UNDER THE SKIN AT BEDTIME (PLEASE MAKE APPOINTMENT FOR FURTHER REFILLS)    Type 2 diabetes mellitus with diabetic nephropathy, with long-term current use of insulin (H)       * insulin glargine 100 UNIT/ML injection    LANTUS SOLOSTAR    15 mL    Take 8 units in the morning and 40 units in the evening    Type 2 diabetes mellitus with diabetic nephropathy, with long-term current use of insulin (H)       losartan 50 MG tablet    COZAAR    30 tablet    TAKE ONE TABLET BY MOUTH EVERY DAY    CKD (chronic kidney disease) stage 3, GFR 30-59 ml/min, CHF (NYHA class II, ACC/AHA stage C) (H), Chronic systolic heart failure (H), Wheezing       nitroGLYcerin 0.4 MG sublingual tablet    NITROSTAT    10 tablet    Place 1 tablet (0.4 mg) under the tongue every 5 minutes as needed for chest pain If you are still having symptoms after 3 doses (15 minutes) call 911.    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 (H)       NovoLOG FLEXPEN 100 UNIT/ML injection   Generic drug:  insulin aspart     3 mL    10 units before lunch, 10 units before dinner    Type 2 diabetes mellitus with diabetic nephropathy (H)       * ONETOUCH ULTRA test strip   Generic drug:  blood glucose  monitoring     100 each    USE TO TEST BLOOD SUGAR TWO TIMES A DAY OR AS DIRECTED    Type 2 diabetes mellitus with diabetic nephropathy, with long-term current use of insulin (H)       * blood glucose monitoring test strip    ONETOUCH ULTRA    100 each    Use to test blood sugar 3 times daily or as directed.    Type 2 diabetes mellitus with diabetic nephropathy, with long-term current use of insulin (H)       order for DME     1 Units    Auto CPAP 8-15 cmH20        sitagliptin 50 MG tablet    JANUVIA    90 tablet    Take 1 tablet (50 mg) by mouth daily    Type 2 diabetes mellitus with hyperglycemia, with long-term current use of insulin (H), Type 2 diabetes mellitus with stage 3 chronic kidney disease, with long-term current use of insulin (H)       * Notice:  This list has 4 medication(s) that are the same as other medications prescribed for you. Read the directions carefully, and ask your doctor or other care provider to review them with you.

## 2018-08-06 NOTE — PROGRESS NOTES
[facility]  Endocrinology  Marisabel Prieto PA-C  08/06/2018      Chief Complaint:   Diabetes    History of Present Illness:   Lucian Oliveira is a 62 year old male with a history of CAD, type II diabetes mellitus, hypertension, CKD stage 3 and CHANTEL who presents with his wife and a  for DM2 follow-up. He is an immigrant from [place] in 1973. Diagnosed DM2 at age 45, diagnosed by PMD by regular check up. Prescribed oral meds but not much taking and only with diet exercise. Insulin use was started over 1 year ago, Lantus was started with 15 units and now 40 units insulin at night. Used to take Metformin and was discontinued due to GFR.     He has been seeing multiple providers in diabetes education. They are working to lower his blood sugar, he is currently using Januvia 50 mg daily, Lantus 8 units in the AM and 40 units in the PM, and glimepiride 4 mg prior to breakfast.     Today, he reports his diabetic control is okay. A1c of 8.3%. He is not checking his blood sugar often as he is afraid of the needle. He has been exercising at the gym by using the bike, treadmill and lifting weights. He goes to the gym 3-4 days per week. This exercise is causing back pain, he previously injured his back a few years ago for which he had PT for at the time. He is open to starting PT again.    He is also having difficulties with insurance coverage for his sensors and he has not been checking his BG as he is fearful of it and does not tolerate the pain.  He is able to endure his Lantus injections twice daily.    Other concerns discussed:  1. His weight is increasing in recent months though stable over the last year. He reports eating less and trying to lose weight by eating less salt and carbohydrates. He eats sandwiches, but does not consume beer, juice or cigarettes.     Blood Glucose Monitoring:  We reviewed glucometer and Navjot Pro CGM data together.  # of tests: 8  Avg of 130 +/- 34.2  Ranging form , most  are checked fasting  Hypoglycemia:  He denies problems with hypoglycemia reporting a few sugars of 87, 137 in the evening, and 140 midday    BG Goal:  Fastin-140  Before meals:   Before bed:     Diabetes monitoring and complications:  CAD: Yes  Last eye exam results: : not discussed today.  Last dental exam: not discussed today.  Microalbuminuria: 44 on 17.  HTN: Yes  On Statin: Yes  On Aspirin: Yes  Depression: No    Review of Systems:   Pertinent items are noted in HPI.  All other systems are negative.    Active Medications:      aspirin 81 MG tablet, Take 1 tablet (81 mg) by mouth daily, Disp: 30 tablet, Rfl: 11     atorvastatin (LIPITOR) 40 MG tablet, TAKE ONE TABLET BY MOUTH EVERY DAY, Disp: 30 tablet, Rfl: 0     B-D U/F insulin pen needle, USE 1 NEEDLE DAILY AS DIRECTED, Disp: 30 each, Rfl: 0     carvedilol (COREG) 25 MG tablet, Take 1 tablet (25 mg) by mouth 2 times daily (with meals), Disp: 180 tablet, Rfl: 3     clopidogrel (PLAVIX) 75 MG tablet, TAKE ONE TABLET BY MOUTH EVERY DAY, Disp: 30 tablet, Rfl: 0     continuous blood glucose monitoring (FREESTYLE JEREMY) sensor, For use with Freestyle Jeremy Flash  for continuous monitoring of blood glucose levels. Replace sensor every 10 days., Disp: 3 each, Rfl: 11     furosemide (LASIX) 40 MG tablet, Take 1 tablet (40 mg) by mouth daily - Take an extra 40mg as needed if weight goes up 2 pounds overnight, or more than 5 pounds in 1 week., Disp: 180 tablet, Rfl: 3     glimepiride (AMARYL) 4 MG tablet, Take 1 tablet (4 mg) by mouth every morning (before breakfast), Disp: 180 tablet, Rfl: 3     insulin glargine (LANTUS SOLOSTAR) 100 UNIT/ML pen, Take 8 units in the morning and 40 units in the evening, Disp: 15 mL, Rfl: 11     isosorbide mononitrate (IMDUR) 30 MG 24 hr tablet, TAKE ONE AND ONE-HALF TABLETS BY MOUTH ONCE DAILY, Disp: 45 tablet, Rfl: 0     LANTUS SOLOSTAR 100 UNIT/ML soln, INJECT 40 UNITS UNDER THE SKIN AT BEDTIME (PLEASE  "MAKE APPOINTMENT FOR FURTHER REFILLS), Disp: 15 mL, Rfl: 0     losartan (COZAAR) 50 MG tablet, TAKE ONE TABLET BY MOUTH EVERY DAY, Disp: 30 tablet, Rfl: 0     nitroglycerin (NITROSTAT) 0.4 MG SL tablet, Place 1 tablet (0.4 mg) under the tongue every 5 minutes as needed for chest pain If you are still having symptoms after 3 doses (15 minutes) call 911. (Patient not taking: Reported on 4/30/2018), Disp: 10 tablet, Rfl: 0     sitagliptin (JANUVIA) 50 MG tablet, Take 1 tablet (50 mg) by mouth daily, Disp: 90 tablet, Rfl: 5      Allergies:   No known drug allergies      Past Medical History:  CAD  Type II diabetes mellitus  Hyperlipidemia  Hypertension  CHF  CKD stage 3  Automatic implantable cardioverter-defibrillator  Chronic systolic heart failure  proteinuria  Vitamin D deficiency  OA of knee  Chondromalacia of patella  Tinnitus   Ptosis of right eyelid  Hyperparathyroidism, renal  Cortical cataracts, bilateral  Diabetic retinopathy  CHANTEL      Past Surgical History:  C CABG, artery-vein, three  Implant automatic implantable cardioverter defibrillator     Family History:   Brother: diabetes  Sister(s): diabetes      Social History:   The patient is  with 4 children, a nonsmoker and rarely consumes alcohol.     Physical Exam:   /71  Pulse 73  Ht 1.64 m (5' 4.57\")  Wt 91.5 kg (201 lb 11.2 oz)  BMI 34.02 kg/m2     Wt Readings from Last 10 Encounters:   08/06/18 91.5 kg (201 lb 11.2 oz)   04/30/18 89.4 kg (197 lb)   04/30/18 89.4 kg (197 lb 1.6 oz)   10/13/17 88 kg (194 lb)   09/27/17 89.1 kg (196 lb 6.4 oz)   09/19/17 89.7 kg (197 lb 12.8 oz)   09/14/17 89.4 kg (197 lb)   09/06/17 88.5 kg (195 lb)   07/05/17 89.4 kg (197 lb 3.2 oz)   06/05/17 90.3 kg (199 lb)   General: Pleasant, well nourished and hydrated male in NAD accompanied by his wife.   was available if needed for any clarifications.    Psych:  Mood is \"good,\" affect is appropriate.  Thought form and content are fluid and " coherent.    HEENT: Eyes and sclera are clear. Extraocular movements are grossly intact without proptosis.  Nares are patent, mucous membranes moist.  Neck: No masses or JVD are noted.    Resp: Easy and unlabored breathing.   Neuro: Alert and oriented, communicating clearly.   Ext: no swelling or edema      Data:  Lab Results   Component Value Date     05/04/2018    POTASSIUM 4.8 05/04/2018    CHLORIDE 106 05/04/2018    CO2 26 05/04/2018    ANIONGAP 6 05/04/2018     (H) 05/04/2018    BUN 38 (H) 05/04/2018    CR 1.96 (H) 05/04/2018    BRYANNA 8.9 05/04/2018     Lab Results   Component Value Date    GFRESTIMATED 35 (L) 05/04/2018    GFRESTIMATED 36 (L) 04/30/2018    GFRESTIMATED 39 (L) 09/13/2017    GFRESTBLACK 42 (L) 05/04/2018    GFRESTBLACK 44 (L) 04/30/2018    GFRESTBLACK 47 (L) 09/13/2017      Lab Results   Component Value Date    MICROL 44 04/27/2017    UMALCR 39.38 (H) 04/27/2017     Lab Results   Component Value Date    A1C 9.8 (H) 09/06/2017    A1C 9.1 (H) 07/05/2017    A1C 10.5 (H) 04/27/2017    A1C 9.8 (H) 02/07/2017    A1C 10.7 (H) 01/05/2017    HEMOGLOBINA1 10.6 (A) 04/30/2018     No results found for: CPEPT, GADAB, ISCAB    Lab Results   Component Value Date    CHOL 93 07/05/2017    CHOL 99 07/06/2016    TRIG 226 (H) 07/05/2017    TRIG 225 (H) 07/06/2016    HDL 26 (L) 07/05/2017    HDL 31 (L) 07/06/2016    LDL 22 07/05/2017    LDL 23 07/06/2016    NHDL 67 07/05/2017    NHDL 68 07/06/2016     Assessment and Plan:  Type 2 diabetes mellitus with diabetic nephropathy (H)  Lucian Oliveira is a 63 yo male with history of CAD, type II diabetes mellitus, hypertension, CKD stage 3 and CHANTEL for a follow-up. His blood glucose levels remain above target, especially following meals, especially dinner,  per recent Navjot pro download.    One might consider empagliflozin as GFR is >30, but mealtime insulin is likely inevitable and likely lower risk though he will need CGM as unable to check fingerstick reliably.   We discussed the possibility of V-GO device and he and wife are interested in this option.  I think he would require less insulin  With this and be able to maintain or lose weight more successfully with on demand dosing that involves less injections, especially is paired with CGM.  I suspect his Lantus dose is excessive and should come down once mealtime insulin is started.  I suspect he will do well on V-40 though V-30 may be more sufficient and would be advised if so.       For now, he will start using short-acting insulin, Novolog 10 units, with dinner; I suspect will need with lunch as well.  He will see diabetes education in regards to possible V-GO transition.   - Hemoglobin A1c POCT  - NOVOLOG FLEXPEN 100 UNIT/ML soln  Dispense: 3 mL; Refill: 3  - continuous blood glucose monitoring (FREESTYLE JEREMY) sensor  Dispense: 9 each; Refill: 3     Follow-up: Return in about 2-4 weeks..     >50% of 45 minute visit spent in face to face counseling, education and coordination of care related to options for better glycemic control to prevent further complications as well as preventing, detecting, and treating hypoglycemia.      My best wishes,    Marisabel Prieto PA-C, [facility]  [facility]  Diabetes, Endocrinology, and Metabolism  [phone] Appointments/Nurse  [phone] nurse line  [phone] URGENTafter hours/weekend Endocrinologist on call        Scribe Disclosure:   I, Dacia Mills, am serving as a scribe to document services personally performed by Marisabel Prieto PA-C at this visit, based upon the provider's statements to me. All documentation has been reviewed by the aforementioned provider prior to being entered into the official medical record.     Portions of this medical record were completed by a scribe. UPON MY REVIEW AND AUTHENTICATION BY ELECTRONIC SIGNATURE, this confirms (a) I performed the applicable clinical services, and (b) the record is accurate.

## 2018-08-07 NOTE — TELEPHONE ENCOUNTER
Routing refill request to provider for review/approval because:  Kirsten given x1 and patient did not follow up, please advise  Labs not current:  lipids  Patient needs to be seen because:  Last seen by Anna 9-2017

## 2018-08-08 NOTE — PROGRESS NOTES
Preliminary Device Interrogation Results.  Final physician signed paceart report to be scanned and attached.    Remote ICD transmission received and reviewed.  Device transmission sent per MD orders. Patient has a [employer] single lead ICD. Normal ICD function. 8 NSVT episodes recorded. The log book reports 7-17 seconds at 123-228 bpm. The available EGMs show NSVT up to 4 beats. His presenting rhythm is SR 80 bpm.  = 0%.  Estimated battery longevity = 11 years.  Patient's wife notified of interrogation results. Plan for patient to return to clinic in 3 months as scheduled.  Remote ICD transmission

## 2018-08-16 ENCOUNTER — OFFICE VISIT (OUTPATIENT)
Dept: FAMILY MEDICINE | Facility: CLINIC | Age: 63
End: 2018-08-16
Payer: MEDICARE

## 2018-08-16 VITALS
DIASTOLIC BLOOD PRESSURE: 68 MMHG | SYSTOLIC BLOOD PRESSURE: 108 MMHG | OXYGEN SATURATION: 97 % | RESPIRATION RATE: 20 BRPM | BODY MASS INDEX: 33.56 KG/M2 | HEART RATE: 72 BPM | WEIGHT: 199 LBS | TEMPERATURE: 96 F

## 2018-08-16 DIAGNOSIS — E11.21 TYPE 2 DIABETES MELLITUS WITH DIABETIC NEPHROPATHY, WITH LONG-TERM CURRENT USE OF INSULIN (H): Chronic | ICD-10-CM

## 2018-08-16 DIAGNOSIS — I10 HYPERTENSION GOAL BP (BLOOD PRESSURE) < 140/90: Chronic | ICD-10-CM

## 2018-08-16 DIAGNOSIS — R06.2 WHEEZING: ICD-10-CM

## 2018-08-16 DIAGNOSIS — Z12.5 SCREENING FOR PROSTATE CANCER: ICD-10-CM

## 2018-08-16 DIAGNOSIS — E78.5 HYPERLIPIDEMIA LDL GOAL <100: Chronic | ICD-10-CM

## 2018-08-16 DIAGNOSIS — I25.739 CORONARY ARTERY DISEASE INVOLVING NONAUTOLOGOUS BIOLOGICAL CORONARY BYPASS GRAFT WITH ANGINA PECTORIS (H): ICD-10-CM

## 2018-08-16 DIAGNOSIS — Z12.11 SCREEN FOR COLON CANCER: ICD-10-CM

## 2018-08-16 DIAGNOSIS — I50.9 CHF (NYHA CLASS II, ACC/AHA STAGE C) (H): Chronic | ICD-10-CM

## 2018-08-16 DIAGNOSIS — Z13.89 SCREENING FOR DIABETIC PERIPHERAL NEUROPATHY: ICD-10-CM

## 2018-08-16 DIAGNOSIS — I25.10 CORONARY ARTERY DISEASE INVOLVING NATIVE CORONARY ARTERY OF NATIVE HEART WITHOUT ANGINA PECTORIS: ICD-10-CM

## 2018-08-16 DIAGNOSIS — E66.811 CLASS 1 OBESITY WITH SERIOUS COMORBIDITY AND BODY MASS INDEX (BMI) OF 33.0 TO 33.9 IN ADULT, UNSPECIFIED OBESITY TYPE: ICD-10-CM

## 2018-08-16 DIAGNOSIS — I50.22 CHRONIC SYSTOLIC HEART FAILURE (H): ICD-10-CM

## 2018-08-16 DIAGNOSIS — Z00.01 ENCOUNTER FOR ROUTINE ADULT HEALTH EXAMINATION WITH ABNORMAL FINDINGS: Primary | ICD-10-CM

## 2018-08-16 DIAGNOSIS — N25.81 SECONDARY RENAL HYPERPARATHYROIDISM (H): ICD-10-CM

## 2018-08-16 DIAGNOSIS — Z79.4 TYPE 2 DIABETES MELLITUS WITH DIABETIC NEPHROPATHY, WITH LONG-TERM CURRENT USE OF INSULIN (H): Chronic | ICD-10-CM

## 2018-08-16 DIAGNOSIS — N18.30 CKD (CHRONIC KIDNEY DISEASE) STAGE 3, GFR 30-59 ML/MIN (H): Chronic | ICD-10-CM

## 2018-08-16 LAB
ALBUMIN SERPL-MCNC: 3.5 G/DL (ref 3.4–5)
ALP SERPL-CCNC: 110 U/L (ref 40–150)
ALT SERPL W P-5'-P-CCNC: 26 U/L (ref 0–70)
ANION GAP SERPL CALCULATED.3IONS-SCNC: 5 MMOL/L (ref 3–14)
AST SERPL W P-5'-P-CCNC: 19 U/L (ref 0–45)
BILIRUB SERPL-MCNC: 0.5 MG/DL (ref 0.2–1.3)
BUN SERPL-MCNC: 42 MG/DL (ref 7–30)
CALCIUM SERPL-MCNC: 8.8 MG/DL (ref 8.5–10.1)
CHLORIDE SERPL-SCNC: 105 MMOL/L (ref 94–109)
CHOLEST SERPL-MCNC: 91 MG/DL
CO2 SERPL-SCNC: 29 MMOL/L (ref 20–32)
CREAT SERPL-MCNC: 2.33 MG/DL (ref 0.66–1.25)
GFR SERPL CREATININE-BSD FRML MDRD: 28 ML/MIN/1.7M2
GLUCOSE SERPL-MCNC: 127 MG/DL (ref 70–99)
HDLC SERPL-MCNC: 25 MG/DL
LDLC SERPL CALC-MCNC: 7 MG/DL
NONHDLC SERPL-MCNC: 66 MG/DL
POTASSIUM SERPL-SCNC: 5.3 MMOL/L (ref 3.4–5.3)
PROT SERPL-MCNC: 7.5 G/DL (ref 6.8–8.8)
PSA SERPL-ACNC: 0.17 UG/L (ref 0–4)
PTH-INTACT SERPL-MCNC: 180 PG/ML (ref 18–80)
SODIUM SERPL-SCNC: 139 MMOL/L (ref 133–144)
TRIGL SERPL-MCNC: 297 MG/DL

## 2018-08-16 PROCEDURE — 99207 C FOOT EXAM  NO CHARGE: CPT | Mod: 25 | Performed by: PHYSICIAN ASSISTANT

## 2018-08-16 PROCEDURE — 36415 COLL VENOUS BLD VENIPUNCTURE: CPT | Performed by: PHYSICIAN ASSISTANT

## 2018-08-16 PROCEDURE — G0103 PSA SCREENING: HCPCS | Performed by: PHYSICIAN ASSISTANT

## 2018-08-16 PROCEDURE — 80053 COMPREHEN METABOLIC PANEL: CPT | Performed by: PHYSICIAN ASSISTANT

## 2018-08-16 PROCEDURE — 99213 OFFICE O/P EST LOW 20 MIN: CPT | Mod: 25 | Performed by: PHYSICIAN ASSISTANT

## 2018-08-16 PROCEDURE — 80061 LIPID PANEL: CPT | Performed by: PHYSICIAN ASSISTANT

## 2018-08-16 PROCEDURE — 99396 PREV VISIT EST AGE 40-64: CPT | Performed by: PHYSICIAN ASSISTANT

## 2018-08-16 PROCEDURE — 83970 ASSAY OF PARATHORMONE: CPT | Performed by: PHYSICIAN ASSISTANT

## 2018-08-16 RX ORDER — LOSARTAN POTASSIUM 50 MG/1
50 TABLET ORAL DAILY
Qty: 90 TABLET | Refills: 3 | Status: SHIPPED | OUTPATIENT
Start: 2018-08-16 | End: 2018-10-22

## 2018-08-16 RX ORDER — ATORVASTATIN CALCIUM 40 MG/1
40 TABLET, FILM COATED ORAL DAILY
Qty: 90 TABLET | Refills: 3 | Status: SHIPPED | OUTPATIENT
Start: 2018-08-16 | End: 2018-10-22

## 2018-08-16 RX ORDER — FUROSEMIDE 40 MG
40 TABLET ORAL DAILY
Qty: 100 TABLET | Refills: 3 | Status: SHIPPED | OUTPATIENT
Start: 2018-08-16 | End: 2018-10-22

## 2018-08-16 RX ORDER — CARVEDILOL 25 MG/1
25 TABLET ORAL 2 TIMES DAILY WITH MEALS
Qty: 180 TABLET | Refills: 3 | Status: SHIPPED | OUTPATIENT
Start: 2018-08-16 | End: 2018-10-16

## 2018-08-16 RX ORDER — ISOSORBIDE MONONITRATE 30 MG/1
TABLET, EXTENDED RELEASE ORAL
Qty: 135 TABLET | Refills: 3 | Status: SHIPPED | OUTPATIENT
Start: 2018-08-16 | End: 2018-10-22

## 2018-08-16 NOTE — PATIENT INSTRUCTIONS
If you use the Novolog (fast acting insulin) at dinner time recheck your blood sugar 2 hours after you eat.  Your blood sugar should be between 130-180.  If your blood sugar is too low after using this insulin then decrease the dose to 5-8 units.    Si usa Novolog (insulina de acción rápida) en hora de la jesusita vuelva a verificar lainez azúcar en la sparkle 2 horas después de comer.   Azúcar en la sparkle debe estar entre 130-180. Si lainez azúcar en la sparkle es demasiado bajo después de utilizar esta insulina luego disminuir la dosis a 5-8 unidades.       Preventive Health Recommendations  Male Ages 50 - 64    Yearly exam:             See your health care provider every year in order to  o   Review health changes.   o   Discuss preventive care.    o   Review your medicines if your doctor has prescribed any.     Have a cholesterol test every 5 years, or more frequently if you are at risk for high cholesterol/heart disease.     Have a diabetes test (fasting glucose) every three years. If you are at risk for diabetes, you should have this test more often.     Have a colonoscopy at age 50, or have a yearly FIT test (stool test). These exams will check for colon cancer.      Talk with your health care provider about whether or not a prostate cancer screening test (PSA) is right for you.    You should be tested each year for STDs (sexually transmitted diseases), if you re at risk.     Shots: Get a flu shot each year. Get a tetanus shot every 10 years.     Nutrition:    Eat at least 5 servings of fruits and vegetables daily.     Eat whole-grain bread, whole-wheat pasta and brown rice instead of white grains and rice.     Get adequate Calcium and Vitamin D.     Lifestyle    Exercise for at least 150 minutes a week (30 minutes a day, 5 days a week). This will help you control your weight and prevent disease.     Limit alcohol to one drink per day.     No smoking.     Wear sunscreen to prevent skin cancer.     See your dentist every  six months for an exam and cleaning.     See your eye doctor every 1 to 2 years.

## 2018-08-16 NOTE — PROGRESS NOTES
SUBJECTIVE:   CC: Lucian Oliveira is an 62 year old male who presents for preventative health visit.     Healthy Habits:    Do you get at least three servings of calcium containing foods daily (dairy, green leafy vegetables, etc.)? yes    Amount of exercise or daily activities, outside of work: 4 day(s) per week    Problems taking medications regularly No    Medication side effects: No    Have you had an eye exam in the past two years? yes    Do you see a dentist twice per year? no    Do you have sleep apnea, excessive snoring or daytime drowsiness?yes snoring, daytime drowsiness       PROBLEMS TO ADD ON...  Patient informed that anything we discuss that is not related to preventative medicine, may be billed for; patient verbalizes understanding.  Refills on medication  Refill(s) and/or routine labs needed for chronic conditions: DM, HTN, CHF, lipids, CKD.   Patient is doing well, seeing endocrine for DM.   -------------------------------------    Today's PHQ-2 Score:   PHQ-2 ( 1999 [employer]) 8/16/2018 9/27/2017   Q1: Little interest or pleasure in doing things 0 0   Q2: Feeling down, depressed or hopeless 0 0   PHQ-2 Score 0 0       Abuse: Current or Past(Physical, Sexual or Emotional)- No  Do you feel safe in your environment - No    Social History   Substance Use Topics     Smoking status: Never Smoker     Smokeless tobacco: Never Used      Comment: Never smoked; non-smoking household     Alcohol use No      Comment: rare use      If you drink alcohol do you typically have >3 drinks per day or >7 drinks per week? No                      Last PSA:   PSA   Date Value Ref Range Status   11/07/2016 0.24 0 - 4 ug/L Final     Comment:     Assay Method:  Chemiluminescence using Siemens Vista analyzer       Reviewed orders with patient. Reviewed health maintenance and updated orders accordingly - Yes  Labs reviewed in EPIC    Reviewed and updated as needed this visit by clinical staff  Tobacco  Allergies  Meds          Reviewed and updated as needed this visit by Provider        Past Medical History:   Diagnosis Date     NO ACTIVE PROBLEMS         ROS:  Other than what is noted in the HPI and PMH a complete review of systems is otherwise negative including: Constitutional, HEENT, endocrine, cardiovascular, respiratory, GI/, musculoskeletal, neuro, and psychiatric.     OBJECTIVE:   /68  Pulse 72  Temp 96  F (35.6  C) (Tympanic)  Resp 20  Wt 199 lb (90.3 kg)  SpO2 97%  BMI 33.56 kg/m2  EXAM:  GENERAL: healthy, alert and no distress  EYES: Eyes grossly normal to inspection, PERRL and conjunctivae and sclerae normal  HENT: ear canals and TM's normal, nose and mouth without ulcers or lesions  NECK: no adenopathy, no asymmetry, masses, or scars and thyroid normal to palpation  RESP: lungs clear to auscultation - no rales, rhonchi or wheezes  CV: regular rates and rhythm, normal S1 S2, no S3 or S4, no murmur, click or rub and no bruits heard   ABDOMEN: soft, nontender, no hepatosplenomegaly, no masses and bowel sounds normal  MS: no gross musculoskeletal defects noted, no edema  SKIN: no suspicious lesions or rashes  NEURO: Normal strength and tone, mentation intact and speech normal  PSYCH: mentation appears normal, affect normal/bright  Diabetic foot exam: normal DP pulses, no trophic changes or ulcerative lesions, normal sensory exam, normal monofilament exam and PT reduced bilateral    ASSESSMENT/PLAN:       ICD-10-CM    1. Encounter for routine adult health examination with abnormal findings Z00.01    2. Screen for colon cancer Z12.11 Fecal colorectal cancer screen (FIT)   3. Screening for diabetic peripheral neuropathy Z13.89 FOOT EXAM  NO CHARGE [91516.114]     OPHTHALMOLOGY ADULT REFERRAL   4. Hyperlipidemia LDL goal <100 E78.5 Lipid panel reflex to direct LDL Fasting   5. CHF (NYHA class II, ACC/AHA stage C) (H) I50.9 atorvastatin (LIPITOR) 40 MG tablet     furosemide (LASIX) 40 MG tablet     losartan (COZAAR)  50 MG tablet   6. Hypertension goal BP (blood pressure) < 140/90 I10 COMPREHENSIVE METABOLIC PANEL     carvedilol (COREG) 25 MG tablet   7. Type 2 diabetes mellitus with diabetic nephropathy, with long-term current use of insulin (H) E11.21 COMPREHENSIVE METABOLIC PANEL    Z79.4 Albumin Random Urine Quantitative with Creat Ratio     CANCELED: Albumin Random Urine Quantitative with Creat Ratio     CANCELED: C FOOT EXAM  NO CHARGE   8. CKD (chronic kidney disease) stage 3, GFR 30-59 ml/min N18.3 COMPREHENSIVE METABOLIC PANEL     atorvastatin (LIPITOR) 40 MG tablet     losartan (COZAAR) 50 MG tablet     Albumin Random Urine Quantitative with Creat Ratio     CANCELED: Albumin Random Urine Quantitative with Creat Ratio   9. Coronary artery disease involving native coronary artery of native heart without angina pectoris I25.10    10. Secondary renal hyperparathyroidism (H) N25.81 Parathyroid Hormone Intact   11. Class 1 obesity with serious comorbidity and body mass index (BMI) of 33.0 to 33.9 in adult, unspecified obesity type E66.9     Z68.33    12. Chronic systolic heart failure I50.22 atorvastatin (LIPITOR) 40 MG tablet     losartan (COZAAR) 50 MG tablet   13. Wheezing R06.2 atorvastatin (LIPITOR) 40 MG tablet     losartan (COZAAR) 50 MG tablet   14. 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 Screening for prostate cancer Z12.5 Prostate spec antigen screen       Discussed the need for his new insulin, Novolog. We talked about checking his sugar 2 hours after dinner. He will continue to follow up with endo.     Message sent to cards - I believe he is done with his Plavix, but patient is unsure.     Routine labs and screenings discussed today. Follow up annually with me. Follow up with endo and cards as directed.      COUNSELING:  Reviewed preventive health counseling, as reflected in patient  "instructions    BP Readings from Last 1 Encounters:   08/16/18 108/68     Estimated body mass index is 33.56 kg/(m^2) as calculated from the following:    Height as of 8/6/18: 5' 4.57\" (1.64 m).    Weight as of this encounter: 199 lb (90.3 kg).     reports that he has never smoked. He has never used smokeless tobacco.      Counseling Resources:  ATP IV Guidelines  Pooled Cohorts Equation Calculator  FRAX Risk Assessment  ICSI Preventive Guidelines  Dietary Guidelines for Americans, 2010  USDA's MyPlate  ASA Prophylaxis  Lung CA Screening    Anna Archuleta PA-C  [facility]  "

## 2018-08-16 NOTE — MR AVS SNAPSHOT
After Visit Summary   8/16/2018    Lucian Oliveira    MRN: [id number]           Patient Information     Date Of Birth          1955        Visit Information        Provider Department      8/16/2018 7:30 AM Anna Archuleta PA-C; [employer] [facility]        Today's Diagnoses     Encounter for routine adult health examination with abnormal findings    -  1    Screen for colon cancer        Screening for diabetic peripheral neuropathy        Hyperlipidemia LDL goal <100        CHF (NYHA class II, ACC/AHA stage C) (H)        Hypertension goal BP (blood pressure) < 140/90        Type 2 diabetes mellitus with diabetic nephropathy, with long-term current use of insulin (H)        CKD (chronic kidney disease) stage 3, GFR 30-59 ml/min        Coronary artery disease involving native coronary artery of native heart without angina pectoris        Secondary renal hyperparathyroidism (H)        Class 1 obesity with serious comorbidity and body mass index (BMI) of 33.0 to 33.9 in adult, unspecified obesity type        Chronic systolic heart failure        Wheezing        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  Your blood sugar should be between 130-180.  If your blood sugar is too low after using this insulin then decrease the dose to 5-8 units.    Si usa Novolog (insulina de acción rápida) en hora de la jesusita vuelva a verificar lainez azúcar en la sparkle 2 horas después de comer.   Azúcar en la sparkle debe estar entre 130-180. Si lainez azúcar en la sparkle es demasiado bajo después de utilizar esta insulina luego disminuir la dosis a 5-8 unidades.       Preventive Health Recommendations  Male Ages 50 - 64    Yearly exam:             See your  health care provider every year in order to  o   Review health changes.   o   Discuss preventive care.    o   Review your medicines if your doctor has prescribed any.     Have a cholesterol test every 5 years, or more frequently if you are at risk for high cholesterol/heart disease.     Have a diabetes test (fasting glucose) every three years. If you are at risk for diabetes, you should have this test more often.     Have a colonoscopy at age 50, or have a yearly FIT test (stool test). These exams will check for colon cancer.      Talk with your health care provider about whether or not a prostate cancer screening test (PSA) is right for you.    You should be tested each year for STDs (sexually transmitted diseases), if you re at risk.     Shots: Get a flu shot each year. Get a tetanus shot every 10 years.     Nutrition:    Eat at least 5 servings of fruits and vegetables daily.     Eat whole-grain bread, whole-wheat pasta and brown rice instead of white grains and rice.     Get adequate Calcium and Vitamin D.     Lifestyle    Exercise for at least 150 minutes a week (30 minutes a day, 5 days a week). This will help you control your weight and prevent disease.     Limit alcohol to one drink per day.     No smoking.     Wear sunscreen to prevent skin cancer.     See your dentist every six months for an exam and cleaning.     See your eye doctor every 1 to 2 years.            Follow-ups after your visit        Additional Services     OPHTHALMOLOGY ADULT REFERRAL       Your provider has referred you to: [facility]: [facility] [place] [phone]   [email or web address]    Please be aware that coverage of these services is subject to the terms and limitations of your health insurance plan.  Call member services at your health plan with any benefit or coverage questions.      Please bring the following with you to your appointment:    (1) Any X-Rays, CTs or MRIs which have been performed.   Contact the facility where they were done to arrange for  prior to your scheduled appointment.    (2) List of current medications  (3) This referral request   (4) Any documents/labs given to you for this referral                  Follow-up notes from your care team     Return in about 2 months (around 10/16/2018) for a repeat A1c.      Your next 10 appointments already scheduled     Aug 23, 2018 11:30 AM CDT   (Arrive by 11:15 AM)   Office Visit with Suyapa Dias RN   [facility] ([facility])    [address] [place] [address]  3rd [facility] [id number] [phone]           Bring a current list of meds and any records pertaining to this visit. For Physicals, please bring immunization records and any forms needing to be filled out. Please arrive 10 minutes early to complete paperwork.            Oct 16, 2018  8:40 AM CDT   Office Visit with Anna Archuleta PA-C   [facility]    [address] [place] [id number] [phone]           Bring a current list of meds and any records pertaining to this visit. For Physicals, please bring immunization records and any forms needing to be filled out. Please arrive 10 minutes early to complete paperwork.            Oct 22, 2018  2:00 PM CDT   Lab with [facility]   [facility] ([facility])    [address] [place] [id number] [phone]            Oct 22, 2018  2:30 PM CDT   (Arrive by 2:15 PM)   Implanted Defibulator with Uc Cv Device 1   [facility] ([facility])    [address] [place] [address]  [address] [place] [id number] [phone]            Oct 22, 2018  3:00 PM CDT   (Arrive by 2:45 PM)   RETURN HEART FAILURE with Arvin Sheridan MD   [facility])    [address] [place] [address]  [address] [place] [id number] [phone]               Future tests that were ordered for you today     Open Future Orders        Priority Expected Expires Ordered    Fecal colorectal cancer screen (FIT) Routine 9/6/2018 11/8/2018 8/16/2018            Who to contact     Normal or non-critical lab and imaging results will be communicated to you by [employer], letter or phone within 4 business days after the clinic has received the results. If you do not hear from us within 7 days, please contact the clinic through Bouf[employer] or phone. If you have a critical or abnormal lab result, we will notify you by phone as soon as possible.  Submit refill requests through [employer] or call your pharmacy and they will forward the refill request to us. Please allow 3 business days for your refill to be completed.          If you need to speak with a  for additional information , please call: [phone]             Additional Information About Your Visit        [employer] Information     [employer] gives you secure access to your electronic health record. If you see a primary care provider, you can also send messages to your care team and make appointments. If you have questions, please call your primary care clinic.  If you do not have a primary care provider, please call [phone] and they will assist you.        Care EveryWhere ID     This is your Care EveryWhere ID. This could be used by other organizations to access your [place] medical records  [id number]        Your Vitals Were     Pulse Temperature Respirations Pulse Oximetry BMI (Body Mass Index)       72 96  F (35.6  C) (Tympanic) 20 97% 33.56 kg/m2        Blood Pressure from Last 3 Encounters:   08/16/18 108/68   08/06/18 110/71   04/30/18 105/65    Weight from Last 3 Encounters:   08/16/18 199 lb (90.3 kg)   08/06/18 201 lb 11.2 oz (91.5 kg)   04/30/18 197 lb (89.4 kg)              We Performed the Following     Albumin Random Urine Quantitative with Creat Ratio     C FOOT EXAM  NO CHARGE     COMPREHENSIVE  METABOLIC PANEL     FOOT EXAM  NO CHARGE [16871.114]     Lipid panel reflex to direct LDL Fasting     OPHTHALMOLOGY ADULT REFERRAL     Parathyroid Hormone Intact     Prostate spec antigen screen          Today's Medication Changes          These changes are accurate as of 8/16/18  8:30 AM.  If you have any questions, ask your nurse or doctor.               These medicines have changed or have updated prescriptions.        Dose/Directions    atorvastatin 40 MG tablet   Commonly known as:  LIPITOR   This may have changed:  See the new instructions.   Used for:  Chronic systolic heart failure (H), Wheezing, CKD (chronic kidney disease) stage 3, GFR 30-59 ml/min, CHF (NYHA class II, ACC/AHA stage C) (H)   Changed by:  Anna Archuleta PA-C        Dose:  40 mg   Take 1 tablet (40 mg) by mouth daily   Quantity:  90 tablet   Refills:  3       losartan 50 MG tablet   Commonly known as:  COZAAR   This may have changed:  See the new instructions.   Used for:  CKD (chronic kidney disease) stage 3, GFR 30-59 ml/min, CHF (NYHA class II, ACC/AHA stage C) (H), Chronic systolic heart failure (H), Wheezing   Changed by:  Anna Archuleta PA-C        Dose:  50 mg   Take 1 tablet (50 mg) by mouth daily   Quantity:  90 tablet   Refills:  3            Where to get your medicines      These medications were sent to [facility] [place] - [address]  [address], [place] [address]     Phone:  [phone]     atorvastatin 40 MG tablet    carvedilol 25 MG tablet    furosemide 40 MG tablet    isosorbide mononitrate 30 MG 24 hr tablet    losartan 50 MG tablet                Primary Care Provider Office Phone # Fax #    Anna Archuleta PA-C [phone]       [address] [place] [address]        Equal Access to Services     MARIBETH RUSH : Jef Junior, wamuluda kishore, qaybta kaaldebra dallas, joana pulido.  So [facility] [phone].    ATENCIÓN: Si yasmin azar, tiene a lainez disposición servicios gratuitos de asistencia lingüística. Damon may[place] [phone].    We comply with applicable federal civil rights laws and [place] laws. We do not discriminate on the basis of race, color, national origin, age, disability, sex, sexual orientation, or gender identity.            Thank you!     Thank you for choosing [facility]  for your care. Our goal is always to provide you with excellent care. Hearing back from our patients is one way we can continue to improve our services. Please take a few minutes to complete the written survey that you may receive in the mail after your visit with us. Thank you!             Your Updated Medication List - Protect others around you: Learn how to safely use, store and throw away your medicines at www.disposemymeds.org.          This list is accurate as of 8/16/18  8:30 AM.  Always use your most recent med list.                   Brand Name Dispense Instructions for use Diagnosis    aspirin 81 MG tablet     30 tablet    Take 1 tablet (81 mg) by mouth daily    Ischemic cardiomyopathy       atorvastatin 40 MG tablet    LIPITOR    90 tablet    Take 1 tablet (40 mg) by mouth daily    Chronic systolic heart failure (H), Wheezing, CKD (chronic kidney disease) stage 3, GFR 30-59 ml/min, CHF (NYHA class II, ACC/AHA stage C) (H)       B-D U/F 31G X 5 MM   Generic drug:  insulin pen needle     30 each    USE 1 NEEDLE DAILY AS DIRECTED    Type 2 diabetes mellitus with diabetic nephropathy, with long-term current use of insulin (H)       blood glucose monitoring meter device kit     1 kit    Use to test blood sugar 3-4 times daily, as directed.    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 Replace sensor every 10 days.    Type 2 diabetes mellitus with hyperglycemia, with long-term current use of insulin (H)       furosemide 40 MG tablet    LASIX    100 tablet    Take 1 tablet (40 mg) by mouth daily - Take an extra 40mg as needed if weight goes up 2 pounds overnight, or more than 5 pounds in 1 week.    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 (H)       * LANTUS SOLOSTAR 100 UNIT/ML injection   Generic drug:  insulin glargine     15 mL    INJECT 40 UNITS UNDER THE SKIN AT BEDTIME (PLEASE MAKE APPOINTMENT FOR FURTHER REFILLS)    Type 2 diabetes mellitus with diabetic nephropathy, with long-term current use of insulin (H)       * insulin glargine 100 UNIT/ML injection    LANTUS SOLOSTAR    15 mL    Take 8 units in the morning and 40 units in the evening    Type 2 diabetes mellitus with diabetic nephropathy, with long-term current use of insulin (H)       losartan 50 MG tablet    COZAAR    90 tablet    Take 1 tablet (50 mg) by mouth daily    CKD (chronic kidney disease) stage 3, GFR 30-59 ml/min, CHF (NYHA class II, ACC/AHA stage C) (H), Chronic systolic heart failure (H), Wheezing       nitroGLYcerin 0.4 MG  sublingual tablet    NITROSTAT    10 tablet    Place 1 tablet (0.4 mg) under the tongue every 5 minutes as needed for chest pain If you are still having symptoms after 3 doses (15 minutes) call 911.    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    Type 2 diabetes mellitus with diabetic nephropathy, with long-term current use of insulin (H)       order for DME     1 Units    Auto CPAP 8-15 cmH20        sitagliptin 50 MG tablet    JANUVIA    90 tablet    Take 1 tablet (50 mg) by mouth daily    Type 2 diabetes mellitus with hyperglycemia, with long-term current use of insulin (H), Type 2 diabetes mellitus with stage 3 chronic kidney disease, with long-term current use of insulin (H)       * Notice:  This list has 4 medication(s) that are the same as other medications prescribed for you. Read the directions carefully, and ask your doctor or other care provider to review them with you.

## 2018-08-17 RX ORDER — INSULIN GLARGINE 100 [IU]/ML
INJECTION, SOLUTION SUBCUTANEOUS
Qty: 15 ML | Refills: 0 | OUTPATIENT
Start: 2018-08-17

## 2018-08-17 RX ORDER — ISOSORBIDE MONONITRATE 30 MG/1
TABLET, EXTENDED RELEASE ORAL
Qty: 135 TABLET | Refills: 3 | OUTPATIENT
Start: 2018-08-17

## 2018-08-17 RX ORDER — ATORVASTATIN CALCIUM 40 MG/1
TABLET, FILM COATED ORAL
Qty: 90 TABLET | Refills: 0 | OUTPATIENT
Start: 2018-08-17

## 2018-08-21 ENCOUNTER — TELEPHONE (OUTPATIENT)
Dept: FAMILY MEDICINE | Facility: CLINIC | Age: 63
End: 2018-08-21

## 2018-08-21 DIAGNOSIS — N18.30 CKD (CHRONIC KIDNEY DISEASE) STAGE 3, GFR 30-59 ML/MIN (H): Primary | Chronic | ICD-10-CM

## 2018-08-21 NOTE — TELEPHONE ENCOUNTER
Please call patient with the following info:  Will need interpretor. May have questions    Kidney function has decreased slightly. I would like him to see our kidney specialist at this point. Referral placed

## 2018-08-21 NOTE — TELEPHONE ENCOUNTER
Patient notified via  and voiced understanding and agreement.  referral numbers given.  Grace Ricketts RN

## 2018-08-21 NOTE — TELEPHONE ENCOUNTER
Left message via  with family member for patient to call clinic. [phone].  Grace Ricketts RN

## 2018-08-23 ENCOUNTER — ALLIED HEALTH/NURSE VISIT (OUTPATIENT)
Dept: EDUCATION SERVICES | Facility: CLINIC | Age: 63
End: 2018-08-23
Payer: MEDICARE

## 2018-08-23 DIAGNOSIS — E11.21 TYPE 2 DIABETES MELLITUS WITH DIABETIC NEPHROPATHY, WITH LONG-TERM CURRENT USE OF INSULIN (H): Primary | ICD-10-CM

## 2018-08-23 DIAGNOSIS — Z79.4 TYPE 2 DIABETES MELLITUS WITH DIABETIC NEPHROPATHY, WITH LONG-TERM CURRENT USE OF INSULIN (H): Primary | ICD-10-CM

## 2018-08-23 NOTE — PATIENT INSTRUCTIONS
1.  Test your blood sugar before breakfast and two hours after dinner.  Your blood sugar goal is:  Before meals:  mg/dl  2 hours after a meal: <180    2.  Try to reduce the amount of carbs you are eating by decreasing portions sizes.     3.  Keep moving!  Keep going to the gym and working out.    4.  Start the Novolog at 5 units before dinner.    5.  Return in two weeks 9/11/18 at 12:30p    Suyapa Dias RN,CDE  [address] [address]  Phone: [phone]  [email or web address]

## 2018-08-23 NOTE — PROGRESS NOTES
"Diabetes Self-Management Education & Support    Diabetes Education Self Management & Training    SUBJECTIVE/OBJECTIVE:  Presents for: Follow-up  Accompanied by: Spouse  Diabetes education in the past 24mo: Yes  Focus of Visit: Taking Medication  Cultural Influences/Ethnic Background:  Other    Diabetes Symptoms & Complications  Blurred vision: No  Fatigue: Yes (sometimes)  Foot paresthesias: No  Foot ulcerations: No  Polydipsia: No  Polyphagia: Yes  Polyuria: No  Visual change: No  Weakness: Yes  Weight loss: Yes  Slow healing wounds: No  Symptom course: Improving     Barriers:  ESL    Patient Problem List and Family Medical History reviewed for relevant medical history, current medical status, and diabetes risk factors.    Vitals:    Estimated body mass index is 33.56 kg/(m^2) as calculated from the following:    Height as of 8/6/18: 1.64 m (5' 4.57\").    Weight as of 8/16/18: 90.3 kg (199 lb).   Last 3 BP:   BP Readings from Last 3 Encounters:   08/16/18 108/68   08/06/18 110/71   04/30/18 105/65       History   Smoking Status     Never Smoker   Smokeless Tobacco     Never Used     Comment: Never smoked; non-smoking household       Labs:  Lab Results   Component Value Date    A1C 9.8 09/06/2017     Lab Results   Component Value Date     08/16/2018     Lab Results   Component Value Date    LDL 7 08/16/2018     HDL Cholesterol   Date Value Ref Range Status   08/16/2018 25 (L) >39 mg/dL Final   ]  GFR Estimate   Date Value Ref Range Status   08/16/2018 28 (L) >60 mL/min/1.7m2 Final     Comment:     Non  GFR Calc     GFR Estimate If Black   Date Value Ref Range Status   08/16/2018 34 (L) >60 mL/min/1.7m2 Final     Comment:      GFR Calc     Lab Results   Component Value Date    CR 2.33 08/16/2018     No results found for: MICROALBUMIN     Taking Medications  Diabetes Medication(s)     Dipeptidyl Peptidase-4 (DPP-4) Inhibitors Sig    sitagliptin (JANUVIA) 50 MG tablet Take 1 " tablet (50 mg) by mouth daily    Insulin Sig    insulin glargine (LANTUS SOLOSTAR) 100 UNIT/ML pen Take 8 units in the morning and 40 units in the evening    LANTUS SOLOSTAR 100 UNIT/ML soln INJECT 40 UNITS UNDER THE SKIN AT BEDTIME (PLEASE MAKE APPOINTMENT FOR FURTHER REFILLS)    NOVOLOG FLEXPEN 100 UNIT/ML soln 10 units before lunch, 10 units before dinner    Sulfonylureas Sig    glimepiride (AMARYL) 4 MG tablet Take 1 tablet (4 mg) by mouth every morning (before breakfast)           Patient Activation Measure Survey Score:  JAYA Score (Last Two) 1/28/2013 11/13/2014   JAYA Raw Score 42 39   Activation Score 66 56.4   JAYA Level 3 3       Assessment:  Lucian has not started the Novolog ordered at his last visit with Marisabel Prieto.  He picked it up from the pharmacy and the paperwork said it can cause low blood sugar so he did not start it.  The hypoglycemia protocol was reviewed.  Lucian has been low one other time and he knows how it feels.  He seemed to understand the material.    His meter was downloaded and his 14-day meter average is 127 mg/dl.  This only reflects fasting blood sugar.  We have no data for the rest of the day.    He reports that he is eating less and his wife confirms this.  He is also exercising regularly.  He joined a gym and is swimming.    Patient's most recent   Lab Results   Component Value Date    A1C 9.8 09/06/2017    is meeting goal of <7.0    Diabetes knowledge and skills assessment:     Patient is knowledgeable in diabetes management concepts related to: Being Active and Taking Medication    Patient needs further education on the following diabetes management concepts: Healthy Eating, Taking Medication, Problem Solving, Reducing Risks and Healthy Coping    Based on learning assessment above, most appropriate setting for further diabetes education would be: Individual setting.    Education provided today on:     We reviewed a basal/bolus insulin regimen and how that works.  He is not  anxious to add more shots but agrees to start (a reduced dose) 5 units before dinner and test his blood sugar a couple of hours later and return with that info.  Teaching was done on the VGo today.  This includes practicing filling the Vgo and inserting it.  He was able to do this without difficulty.   He is willing to try it if it is covered by insurance.    Education Materials Provided:  [place] Understanding Diabetes Booklet, Safe Disposal Options for Needles & Syringes, BG Log Sheet and No new materials provided today    PLAN:  See Patient Instructions for co-developed, patient-stated behavior change goals.  AVS printed and provided to patient today. See Follow-Up section for recommended follow-up.    Suyapa Dias RN  Time Spent: 60 minutes  Encounter Type: Individual    Any diabetes medication dose changes were made via the CDE Protocol and Collaborative Practice Agreement with the patient's referring provider. A copy of this encounter was shared with the provider.

## 2018-08-23 NOTE — MR AVS SNAPSHOT
After Visit Summary   8/23/2018    Lucian Oliveira    MRN: [id number]           Patient Information     Date Of Birth          1955        Visit Information        Provider Department      8/23/2018 11:15 AM Jennifer Bailey; Suyapa Dias RN [facility]        Care Instructions    1.  Test your blood sugar before breakfast and two hours after dinner.  Your blood sugar goal is:  Before meals:  mg/dl  2 hours after a meal: <180    2.  Try to reduce the amount of carbs you are eating by decreasing portions sizes.     3.  Keep moving!  Keep going to the gym and working out.    4.  Start the Novolog at 5 units before dinner.    5.  Return in two weeks 9/11/18 at 12:30p    Suyapa Dias RN,PRITESH  [address] [address]  Phone: [phone]  [email or web address]              Follow-ups after your visit        Your next 10 appointments already scheduled     Sep 18, 2018  9:00 AM CDT   New Visit with Annette Mancera MD   [facility]    [address] [facility] [address] [phone]            Oct 16, 2018  8:40 AM CDT   Office Visit with Anna Archuleta PA-C   [facility] ([facility])    [address] [id number] [phone]           Bring a current list of meds and any records pertaining to this visit. For Physicals, please bring immunization records and any forms needing to be filled out. Please arrive 10 minutes early to complete paperwork.            Oct 22, 2018  2:00 PM CDT   Lab with  LAB   [facility] ([facility])    [address] [place] [address]  1st Floor  [facility] [address] [phone]            Oct 22, 2018  2:30 PM CDT   (Arrive by 2:15 PM)   Implanted Defibulator with  Cv Device 1   [facility] ([facility])    [address] [place] [address]  [address]  [facility]  [address] [phone]            Oct 22, 2018  3:00 PM CDT   (Arrive by 2:45 PM)   RETURN HEART FAILURE with Arvin Sheridan MD   [facility] ([facility])    [address] [place] [address]  [address] [place] [address] [phone]              Who to contact     Please call your clinic at [phone] to:    Ask questions about your health    Make or cancel appointments    Discuss your medicines    Learn about your test results    Speak to your doctor            Additional Information About Your Visit        OatmealharAutoShag Information     imbookin (Pogby) gives you secure access to your electronic health record. If you see a primary care provider, you can also send messages to your care team and make appointments. If you have questions, please call your primary care clinic.  If you do not have a primary care provider, please call [phone] and they will assist you.      imbookin (Pogby) is an electronic gateway that provides easy, online access to your medical records. With imbookin (Pogby), you can request a clinic appointment, read your test results, renew a prescription or communicate with your care team.     To access your existing account, please contact your [facility] or call [phone] for assistance.        Care EveryWhere ID     This is your Care EveryWhere ID. This could be used by other organizations to access your [place] medical records  [id number]         Blood Pressure from Last 3 Encounters:   08/16/18 108/68   08/06/18 110/71   04/30/18 105/65    Weight from Last 3 Encounters:   08/16/18 90.3 kg (199 lb)   08/06/18 91.5 kg (201 lb 11.2 oz)   04/30/18 89.4 kg (197 lb)              Today, you had the following     No orders found for display       Primary Care Provider Office Phone # Fax #    Anna Archuleta PA-C [phone]       [address] [address]        Equal Access to Services     MARIBETH RUSH AH: Jef escamilla  lucrecia Junior, wamuluda luqadaha, qaybta kaarina dallas, joana ramiroin hayaan leonelyoung vyconstance lamalilya ashok. So [facility] [phone].    ATENCIÓN: Si habla rylieañol, tiene a lainez disposición servicios gratuitos de asistencia lingüística. Damon al [phone].    We comply with applicable federal civil rights laws and [place] laws. We do not discriminate on the basis of race, color, national origin, age, disability, sex, sexual orientation, or gender identity.            Thank you!     Thank you for choosing [facility]  for your care. Our goal is always to provide you with excellent care. Hearing back from our patients is one way we can continue to improve our services. Please take a few minutes to complete the written survey that you may receive in the mail after your visit with us. Thank you!             Your Updated Medication List - Protect others around you: Learn how to safely use, store and throw away your medicines at www.disposemymeds.org.          This list is accurate as of 8/23/18 12:28 PM.  Always use your most recent med list.                   Brand Name Dispense Instructions for use Diagnosis    aspirin 81 MG tablet     30 tablet    Take 1 tablet (81 mg) by mouth daily    Ischemic cardiomyopathy       atorvastatin 40 MG tablet    LIPITOR    90 tablet    Take 1 tablet (40 mg) by mouth daily    Chronic systolic heart failure (H), Wheezing, CKD (chronic kidney disease) stage 3, GFR 30-59 ml/min, CHF (NYHA class II, ACC/AHA stage C) (H)       B-D U/F 31G X 5 MM   Generic drug:  insulin pen needle     30 each    USE 1 NEEDLE DAILY AS DIRECTED    Type 2 diabetes mellitus with diabetic nephropathy, with long-term current use of insulin (H)       blood glucose monitoring meter device kit     1 kit    Use to test blood sugar 3-4 times daily, as directed.    Type 2 diabetes mellitus with diabetic nephropathy, with long-term current use of insulin (H)       carvedilol 25 MG tablet    COREG    180 tablet    Take  1 tablet (25 mg) by mouth 2 times daily (with meals)    Hypertension goal BP (blood pressure) < 140/90       clopidogrel 75 MG tablet    PLAVIX    30 tablet    TAKE ONE TABLET BY MOUTH EVERY DAY    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 (H)       continuous blood glucose monitoring sensor     9 each    For use with Freestyle Navjot Flash  for continuous monitioring of blood glucose levels. Replace sensor every 10 days.    Type 2 diabetes mellitus with hyperglycemia, with long-term current use of insulin (H)       furosemide 40 MG tablet    LASIX    100 tablet    Take 1 tablet (40 mg) by mouth daily - Take an extra 40mg as needed if weight goes up 2 pounds overnight, or more than 5 pounds in 1 week.    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 (H)       * LANTUS SOLOSTAR 100 UNIT/ML injection   Generic drug:  insulin glargine     15 mL    INJECT 40 UNITS UNDER THE SKIN AT BEDTIME (PLEASE MAKE APPOINTMENT FOR FURTHER REFILLS)    Type 2 diabetes mellitus with diabetic nephropathy, with long-term current use of insulin (H)       * insulin glargine 100 UNIT/ML injection    LANTUS SOLOSTAR    15 mL    Take 8 units in the morning and 40 units in the evening    Type 2 diabetes mellitus with diabetic nephropathy, with long-term current use of insulin (H)       losartan 50 MG tablet    COZAAR    90 tablet    Take 1 tablet (50 mg) by mouth daily    CKD (chronic kidney disease) stage 3, GFR 30-59 ml/min, CHF (NYHA class II,  ACC/AHA stage C) (H), Chronic systolic heart failure (H), Wheezing       nitroGLYcerin 0.4 MG sublingual tablet    NITROSTAT    10 tablet    Place 1 tablet (0.4 mg) under the tongue every 5 minutes as needed for chest pain If you are still having symptoms after 3 doses (15 minutes) call 911.    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    Type 2 diabetes mellitus with diabetic nephropathy, with long-term current use of insulin (H)       order for DME     1 Units    Auto CPAP 8-15 cmH20        sitagliptin 50 MG tablet    JANUVIA    90 tablet    Take 1 tablet (50 mg) by mouth daily    Type 2 diabetes mellitus with hyperglycemia, with long-term current use of insulin (H), Type 2 diabetes mellitus with stage 3 chronic kidney disease, with long-term current use of insulin (H)       * Notice:  This list has 4 medication(s) that are the same as other medications prescribed for you. Read the directions carefully, and ask your doctor or other care provider to review them with you.

## 2018-09-05 DIAGNOSIS — E11.21 TYPE 2 DIABETES MELLITUS WITH DIABETIC NEPHROPATHY, WITH LONG-TERM CURRENT USE OF INSULIN (H): Chronic | ICD-10-CM

## 2018-09-05 DIAGNOSIS — Z79.4 TYPE 2 DIABETES MELLITUS WITH DIABETIC NEPHROPATHY, WITH LONG-TERM CURRENT USE OF INSULIN (H): Chronic | ICD-10-CM

## 2018-09-05 DIAGNOSIS — I25.739 CORONARY ARTERY DISEASE INVOLVING NONAUTOLOGOUS BIOLOGICAL CORONARY BYPASS GRAFT WITH ANGINA PECTORIS (H): ICD-10-CM

## 2018-09-05 NOTE — TELEPHONE ENCOUNTER
"Requested Prescriptions   Pending Prescriptions Disp Refills     B-D U/F insulin pen needle [Pharmacy Med Name: BD PEN NEEDLE MINI  31G X 5 MM MISC] 100 each 0    Last Written Prescription Date:  08/06/18  Last Fill Quantity: 100,  # refills: 0   Last office visit: 8/16/2018 with prescribing provider:  KIMBERLEY Archuleta   Future Office Visit:   Next 5 appointments (look out 90 days)     Oct 16, 2018  8:40 AM CDT   Office Visit with Anna Archuleta PA-C   [facility] ([facility]    [address] [address]   [phone]                  Sig: USE 1 NEEDLE DAILY AS DIRECTED -DUE FOR APPOINTMENT FOR FURTHER REFILLS    Diabetic Supplies Protocol Passed    9/5/2018 12:12 PM       Passed - Patient is 18 years of age or older       Passed - Recent (6 mo) or future (30 days) visit within the authorizing provider's specialty    Patient had office visit in the last 6 months or has a visit in the next 30 days with authorizing provider.  See \"Patient Info\" tab in inbasket, or \"Choose Columns\" in Meds & Orders section of the refill encounter.            clopidogrel (PLAVIX) 75 MG tablet [Pharmacy Med Name: CLOPIDOGREL BISULFATE 75MG TABS] 30 tablet 0    Last Written Prescription Date:  08/06/18  Last Fill Quantity: 30,  # refills: 0   Last office visit: 8/16/2018 with prescribing provider:  KIMBERLEY Archuleta   Future Office Visit:   Next 5 appointments (look out 90 days)     Oct 16, 2018  8:40 AM CDT   [facility]-C   [facility]    [address] [place] [address]   [phone]                  Sig: TAKE ONE TABLET BY MOUTH EVERY DAY, DUE FOR APPOINTMENT    Plavix Passed    9/5/2018 12:12 PM       Passed - No active PPI on record unless is Protonix       Passed - Normal HGB on file in past 12 months    Recent Labs   Lab Test  04/30/18   1148   HGB  14.1              Passed - Normal Platelets on file in " "past 12 months    Recent Labs   Lab Test  04/30/18   1148   PLT  187              Passed - Recent (12 mo) or future (30 days) visit within the authorizing provider's specialty    Patient had office visit in the last 12 months or has a visit in the next 30 days with authorizing provider or within the authorizing provider's specialty.  See \"Patient Info\" tab in inbasket, or \"Choose Columns\" in Meds & Orders section of the refill encounter.           Passed - Patient is age 18 or older        ACCU-CHEK SMARTVIEW test strip [Pharmacy Med Name: ACCU-CHEK SMARTVIEW  STRP] 100 each 3    Last Written Prescription Date:  11/30/17  Last Fill Quantity: 100,  # refills: 3   Last office visit: 8/16/2018 with prescribing provider:  KIMBERLEY Archuleta   Future Office Visit:   Next 5 appointments (look out 90 days)     Oct 16, 2018  8:40 AM CDT   [facility]    [address] [address]   [phone]                  Sig: USE TO TEST BLOOD SUGAR THREE TIMES A DAY OR AS DIRECTED    Diabetic Supplies Protocol Passed    9/5/2018 12:12 PM       Passed - Patient is 18 years of age or older       Passed - Recent (6 mo) or future (30 days) visit within the authorizing provider's specialty    Patient had office visit in the last 6 months or has a visit in the next 30 days with authorizing provider.  See \"Patient Info\" tab in inbasket, or \"Choose Columns\" in Meds & Orders section of the refill encounter.              "

## 2018-09-06 RX ORDER — CLOPIDOGREL BISULFATE 75 MG/1
TABLET ORAL
Qty: 30 TABLET | Refills: 0 | Status: SHIPPED | OUTPATIENT
Start: 2018-09-06 | End: 2018-10-22

## 2018-09-06 RX ORDER — FLURBIPROFEN SODIUM 0.3 MG/ML
SOLUTION/ DROPS OPHTHALMIC
Qty: 100 EACH | Refills: 0 | Status: SHIPPED | OUTPATIENT
Start: 2018-09-06 | End: 2019-08-08

## 2018-09-06 NOTE — TELEPHONE ENCOUNTER
Prescription approved per [facility].  Kirsten refill given and notified patient via patient sig of need for follow up in October for further refills.  Niesha Modi, RN, BSN

## 2018-09-07 ENCOUNTER — TELEPHONE (OUTPATIENT)
Dept: PEDIATRICS | Facility: CLINIC | Age: 63
End: 2018-09-07

## 2018-09-07 DIAGNOSIS — Z79.4 TYPE 2 DIABETES MELLITUS WITH DIABETIC NEPHROPATHY, WITH LONG-TERM CURRENT USE OF INSULIN (H): Chronic | ICD-10-CM

## 2018-09-07 DIAGNOSIS — E11.21 TYPE 2 DIABETES MELLITUS WITH DIABETIC NEPHROPATHY, WITH LONG-TERM CURRENT USE OF INSULIN (H): Chronic | ICD-10-CM

## 2018-09-07 NOTE — TELEPHONE ENCOUNTER
Your office sent an rx to us for his blood glucose strips. We are not a Medicare Part B accredited pharmacy.    Please resend that rx to the [facility] in [place].    Please and thanks.    Laila Mart  Pharmacy Technician  [facility]# [phone]  Fax# [phone]

## 2018-09-11 ENCOUNTER — TELEPHONE (OUTPATIENT)
Dept: FAMILY MEDICINE | Facility: CLINIC | Age: 63
End: 2018-09-11

## 2018-09-11 ENCOUNTER — ALLIED HEALTH/NURSE VISIT (OUTPATIENT)
Dept: EDUCATION SERVICES | Facility: CLINIC | Age: 63
End: 2018-09-11
Payer: MEDICARE

## 2018-09-11 ENCOUNTER — MEDICAL CORRESPONDENCE (OUTPATIENT)
Dept: HEALTH INFORMATION MANAGEMENT | Facility: CLINIC | Age: 63
End: 2018-09-11

## 2018-09-11 DIAGNOSIS — E11.65 TYPE 2 DIABETES MELLITUS WITH HYPERGLYCEMIA, WITH LONG-TERM CURRENT USE OF INSULIN (H): ICD-10-CM

## 2018-09-11 DIAGNOSIS — Z79.4 TYPE 2 DIABETES MELLITUS WITH DIABETIC NEPHROPATHY, WITH LONG-TERM CURRENT USE OF INSULIN (H): Chronic | ICD-10-CM

## 2018-09-11 DIAGNOSIS — N18.30 TYPE 2 DIABETES MELLITUS WITH STAGE 3 CHRONIC KIDNEY DISEASE, WITH LONG-TERM CURRENT USE OF INSULIN (H): ICD-10-CM

## 2018-09-11 DIAGNOSIS — E11.21 TYPE 2 DIABETES MELLITUS WITH DIABETIC NEPHROPATHY, WITH LONG-TERM CURRENT USE OF INSULIN (H): Chronic | ICD-10-CM

## 2018-09-11 DIAGNOSIS — E11.22 TYPE 2 DIABETES MELLITUS WITH STAGE 3 CHRONIC KIDNEY DISEASE, WITH LONG-TERM CURRENT USE OF INSULIN (H): ICD-10-CM

## 2018-09-11 DIAGNOSIS — Z79.4 TYPE 2 DIABETES MELLITUS WITH STAGE 3 CHRONIC KIDNEY DISEASE, WITH LONG-TERM CURRENT USE OF INSULIN (H): ICD-10-CM

## 2018-09-11 DIAGNOSIS — Z79.4 TYPE 2 DIABETES MELLITUS WITH HYPERGLYCEMIA, WITH LONG-TERM CURRENT USE OF INSULIN (H): ICD-10-CM

## 2018-09-11 RX ORDER — GLIMEPIRIDE 4 MG/1
4 TABLET ORAL
Qty: 180 TABLET | Refills: 3 | Status: SHIPPED | OUTPATIENT
Start: 2018-09-11 | End: 2019-09-04

## 2018-09-11 NOTE — PATIENT INSTRUCTIONS
1.  Test your blood sugar before breakfast and before dinner sometimes.  Your blood sugar goals are:  Before meals:  mg/dl  2 hours after a meal: less than 180    2.  Keep decreasing portion sizes.      3.  Keep walking and swimming.    4.  Your fasting blood sugars look great.  You may be higher later in the day.  I am working on getting the Navjot approved for you so we can get more information about that part of the day.    5.  [employer] is the company I am working with to get the Navjot approved.      6.  The Navjot will be mailed to your home.  When you get it please make an appointment to get started on it.  You can call through interpretor services.  [phone]    Suyapa [place] [address]  Phone: [phone]  [email or web address]

## 2018-09-11 NOTE — MR AVS SNAPSHOT
After Visit Summary   9/11/2018    Lucian Oliveira    MRN: [id number]           Patient Information     Date Of Birth          1955        Visit Information        Provider Department      9/11/2018 12:15 PM Briana Pollack; Suyapa Dias RN [facility] Diabetes        Today's Diagnoses     Type 2 diabetes mellitus with hyperglycemia, with long-term current use of insulin (H)        Type 2 diabetes mellitus with diabetic nephropathy, with long-term current use of insulin (H)        Type 2 diabetes mellitus with stage 3 chronic kidney disease, with long-term current use of insulin (H)          Care Instructions    1.  Test your blood sugar before breakfast and before dinner sometimes.  Your blood sugar goals are:  Before meals:  mg/dl  2 hours after a meal: less than 180    2.  Keep decreasing portion sizes.      3.  Keep walking and swimming.    4.  Your fasting blood sugars look great.  You may be higher later in the day.  I am working on getting the Navjot approved for you so we can get more information about that part of the day.    5.  [employer] is the company I am working with to get the Navjot approved.      6.  The Navjot will be mailed to your home.  When you get it please make an appointment to get started on it.  You can call through interpretor services.  [phone]    Suyapa Dias RN,PRITESH  [address] [address]  Phone: [phone]  [email or web address]                      Follow-ups after your visit        Your next 10 appointments already scheduled     Sep 18, 2018  9:00 AM CDT   [facility]    [address] [facility] [address] [phone]            Oct 03, 2018  6:45 AM CDT   Lab with  LAB    [facility]    [address] [place] [address] [phone]            Oct 03,  2018  7:50 AM CDT   (Arrive by 7:20 AM)   Return Visit with Mónica Shelton MD   [facility]    [address]  [address]  [facility] [address] [phone]            Oct 16, 2018  8:40 AM CDT   Office Visit with Anna Archuleta PA-C   [facility]    [address] [address] [phone]           Bring a current list of meds and any records pertaining to this visit. For Physicals, please bring immunization records and any forms needing to be filled out. Please arrive 10 minutes early to complete paperwork.            Oct 22, 2018  2:00 PM CDT   Lab with UC LAB   [facility]    [address] [place] [address]  1st Floor  [facility] [id number] [phone]            Oct 22, 2018  2:30 PM CDT   (Arrive by 2:15 PM)   Implanted Defibulator with  Cv Device 1   [facility]    [address] [place] [address]  3rd Floor  [address]               Oct 22, 2018  3:00 PM CDT   (Arrive by 2:45 PM)   RETURN HEART FAILURE with Arvin Sheridan MD   [facility]    [address] [place] [address]  [address]  [facility] [id number] [phone]              Who to contact     Please call your clinic at [phone] to:    Ask questions about your health    Make or cancel appointments    Discuss your medicines    Learn about your test results    Speak to your doctor            Additional Information About Your Visit        Venuefoxhart Information     Venuefoxhart gives you secure access to your electronic health record. If you see a primary care provider, you can also send messages to your care team and make appointments. If you have questions, please call your primary care clinic.  If you do not have a primary care provider, please call [phone] and they will assist you.      Boris is an  electronic gateway that provides easy, online access to your medical records. With Maven, you can request a clinic appointment, read your test results, renew a prescription or communicate with your care team.     To access your existing account, please contact your [facility] or call [phone] for assistance.        Care EveryWhere ID     This is your Care EveryWhere ID. This could be used by other organizations to access your [facility] records  [id number]         Blood Pressure from Last 3 Encounters:   08/16/18 108/68   08/06/18 110/71   04/30/18 105/65    Weight from Last 3 Encounters:   08/16/18 90.3 kg (199 lb)   08/06/18 91.5 kg (201 lb 11.2 oz)   04/30/18 89.4 kg (197 lb)              Today, you had the following     No orders found for display         Where to get your medicines      These medications were sent to [facility] [id number] [place] [address], [place] - [address] AT [place] [address], [facility] [address]     Phone:  [phone]     glimepiride 4 MG tablet    insulin glargine 100 UNIT/ML injection    sitagliptin 50 MG tablet          Primary Care Provider Office Phone # Fax #    Anna Archuleta PA-C [phone]       [address] [place] [address]        Goals        General    Medical (pt-stated)     Notes - Note created  8/23/2018  1:07 PM by Suyapa Dias, RN    Goal Statement: Test blood sugar two hours after dinner  Measure of Success: blood sugar data when he returns  Supportive Steps to Achieve: set the meter on the kitchen table  Barriers: does not like needles  Strengths: renewed interest in diabetes  Date to Achieve By: 9/11/18  Patient expressed understanding of goal: yes            Equal Access to Services     MARIBETH RUSH AH: Jef Junior, wamuluda kishore, qaybta kaalmajoana bhatia. So [facility] [phone].    ATENCIÓN: Ghislaine hoffman  español, tiene a lainez disposición servicios gratuitos de asistencia lingüística. Damon mayberry [phone].    We comply with applicable federal civil rights laws and [place] laws. We do not discriminate on the basis of race, color, national origin, age, disability, sex, sexual orientation, or gender identity.            Thank you!     Thank you for choosing [facility]  for your care. Our goal is always to provide you with excellent care. Hearing back from our patients is one way we can continue to improve our services. Please take a few minutes to complete the written survey that you may receive in the mail after your visit with us. Thank you!             Your Updated Medication List - Protect others around you: Learn how to safely use, store and throw away your medicines at www.disposemymeds.org.          This list is accurate as of 9/11/18  1:22 PM.  Always use your most recent med list.                   Brand Name Dispense Instructions for use Diagnosis    aspirin 81 MG tablet     30 tablet    Take 1 tablet (81 mg) by mouth daily    Ischemic cardiomyopathy       atorvastatin 40 MG tablet    LIPITOR    90 tablet    Take 1 tablet (40 mg) by mouth daily    Chronic systolic heart failure (H), Wheezing, CKD (chronic kidney disease) stage 3, GFR 30-59 ml/min, CHF (NYHA class II, ACC/AHA stage C) (H)       B-D U/F 31G X 5 MM   Generic drug:  insulin pen needle     100 each    USE 1 NEEDLE DAILY AS DIRECTED -DUE FOR APPOINTMENT FOR FURTHER REFILLS    Type 2 diabetes mellitus with diabetic nephropathy, with long-term current use of insulin (H)       blood glucose monitoring meter device kit     1 kit    Use to test blood sugar 3-4 times daily, as directed.    Type 2 diabetes mellitus with diabetic nephropathy, with long-term current use of insulin (H)       blood glucose monitoring test strip    ACCU-CHEK SMARTVIEW    100 each    Use to test blood sugar three times daily or as directed. Please schedule follow up for  further details.    Type 2 diabetes mellitus with diabetic nephropathy, with long-term current use of insulin (H)       carvedilol 25 MG tablet    COREG    180 tablet    Take 1 tablet (25 mg) by mouth 2 times daily (with meals)    Hypertension goal BP (blood pressure) < 140/90       clopidogrel 75 MG tablet    PLAVIX    30 tablet    TAKE ONE TABLET BY MOUTH EVERY DAY, DUE FOR APPOINTMENT    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 (H)       continuous blood glucose monitoring sensor     9 each    For use with Freestyle Navjot Flash  for continuous monitioring of blood glucose levels. Replace sensor every 10 days.    Type 2 diabetes mellitus with hyperglycemia, with long-term current use of insulin (H)       furosemide 40 MG tablet    LASIX    100 tablet    Take 1 tablet (40 mg) by mouth daily - Take an extra 40mg as needed if weight goes up 2 pounds overnight, or more than 5 pounds in 1 week.    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 (H)       * LANTUS SOLOSTAR 100 UNIT/ML injection   Generic drug:  insulin glargine     15 mL    INJECT 40 UNITS UNDER THE SKIN AT BEDTIME (PLEASE MAKE APPOINTMENT FOR FURTHER REFILLS)    Type 2 diabetes mellitus with diabetic nephropathy, with long-term current use of insulin (H)       * insulin glargine 100 UNIT/ML injection    LANTUS SOLOSTAR    45 mL    Take 8 units in the morning and 40 units in the evening    Type 2 diabetes mellitus with diabetic nephropathy, with long-term  current use of insulin (H)       losartan 50 MG tablet    COZAAR    90 tablet    Take 1 tablet (50 mg) by mouth daily    CKD (chronic kidney disease) stage 3, GFR 30-59 ml/min, CHF (NYHA class II, ACC/AHA stage C) (H), Chronic systolic heart failure (H), Wheezing       nitroGLYcerin 0.4 MG sublingual tablet    NITROSTAT    10 tablet    Place 1 tablet (0.4 mg) under the tongue every 5 minutes as needed for chest pain If you are still having symptoms after 3 doses (15 minutes) call 911.    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 (H)       NovoLOG FLEXPEN 100 UNIT/ML injection   Generic drug:  insulin aspart     3 mL    10 units before lunch, 10 units before dinner        order for DME     1 Units    Auto CPAP 8-15 cmH20        sitagliptin 50 MG tablet    JANUVIA    90 tablet    Take 1 tablet (50 mg) by mouth daily    Type 2 diabetes mellitus with hyperglycemia, with long-term current use of insulin (H), Type 2 diabetes mellitus with stage 3 chronic kidney disease, with long-term current use of insulin (H)       * Notice:  This list has 2 medication(s) that are the same as other medications prescribed for you. Read the directions carefully, and ask your doctor or other care provider to review them with you.

## 2018-09-11 NOTE — PROGRESS NOTES
"Diabetes Self-Management Education & Support    Diabetes Education Self Management & Training    SUBJECTIVE/OBJECTIVE:     Cultural Influences/Ethnic Background:  Other     Patient Problem List and Family Medical History reviewed for relevant medical history, current medical status, and diabetes risk factors.    Vitals:    Estimated body mass index is 33.56 kg/(m^2) as calculated from the following:    Height as of 8/6/18: 1.64 m (5' 4.57\").    Weight as of 8/16/18: 90.3 kg (199 lb).   Last 3 BP:   BP Readings from Last 3 Encounters:   08/16/18 108/68   08/06/18 110/71   04/30/18 105/65       History   Smoking Status     Never Smoker   Smokeless Tobacco     Never Used     Comment: Never smoked; non-smoking household       Labs:  Lab Results   Component Value Date    A1C 9.8 09/06/2017     Lab Results   Component Value Date     08/16/2018     Lab Results   Component Value Date    LDL 7 08/16/2018     HDL Cholesterol   Date Value Ref Range Status   08/16/2018 25 (L) >39 mg/dL Final   ]  GFR Estimate   Date Value Ref Range Status   08/16/2018 28 (L) >60 mL/min/1.7m2 Final     Comment:     Non  GFR Calc     GFR Estimate If Black   Date Value Ref Range Status   08/16/2018 34 (L) >60 mL/min/1.7m2 Final     Comment:      GFR Calc     Lab Results   Component Value Date    CR 2.33 08/16/2018     No results found for: MICROALBUMIN    Healthy Eating  Cultural/Adventist diet restrictions?: No  Patient on a regular basis: Eats 3 meals a day  Meal planning: Avoiding sweets, Smaller portions  Meals include: Breakfast, Lunch, Dinner  Breakfast: 1 slice of wheart bread with peanut butter and jelly, coffee  Lunch: chicken, veggies, water  Dinner: beans, cabbage, cheese, water  Beverages: Water, Coffee  Has patient met with a dietitian in the past?: Yes    Being Active  Exercise:: Yes  Days per week of moderate to strenuous exercise (like a brisk walk): 4  On average, minutes per day of exercise " at this level: 50  How intense was your typical exercise? : Moderate (like brisk walking)  Exercise Minutes per Week: 200  Barrier to exercise: None    Monitoring  Did patient bring glucose meter to appointment? : Yes  Blood Glucose Meter: Accu-check  Home Glucose (Sugar) Monitorin times per day (fasting)  Blood glucose trend: Decreasing steadily  Low Glucose Range (mg/dL): 110-130    Taking Medications  Diabetes Medication(s)     Dipeptidyl Peptidase-4 (DPP-4) Inhibitors Sig    sitagliptin (JANUVIA) 50 MG tablet Take 1 tablet (50 mg) by mouth daily    sitagliptin (JANUVIA) 50 MG tablet Take 1 tablet (50 mg) by mouth daily    Insulin Sig    insulin glargine (LANTUS SOLOSTAR) 100 UNIT/ML pen Take 8 units in the morning and 40 units in the evening    LANTUS SOLOSTAR 100 UNIT/ML soln INJECT 40 UNITS UNDER THE SKIN AT BEDTIME (PLEASE MAKE APPOINTMENT FOR FURTHER REFILLS)    NOVOLOG FLEXPEN 100 UNIT/ML soln 10 units before lunch, 10 units before dinner    insulin glargine (LANTUS SOLOSTAR) 100 UNIT/ML pen Take 8 units in the morning and 40 units in the evening    Sulfonylureas Sig    glimepiride (AMARYL) 4 MG tablet Take 1 tablet (4 mg) by mouth every morning (before breakfast)    glimepiride (AMARYL) 4 MG tablet Take 1 tablet (4 mg) by mouth every morning (before breakfast)        Current Treatments: Insulin Injections  Dose schedule: at bedtime, pre-breakfast  Given by: Patient  Injection/Infusion sites: Abdomen  Problems taking diabetes medications regularly?: Lantus and orals: yes, Novolog no  Diabetes medication side effects?: No  Treatment Compliance: Doing better with lifestyle changes, Fear of Novolog    Problem Solving  Hypoglycemia Frequency: Rarely  Patient carries a carbohydrate source: No, given glucose tabs today  Medical alert: No    Hypoglycemia Symptoms  Confusion: No  Dizziness or Light-Headedness: No  Headaches: No  Hunger: No  Mood changes: No  Nervousness/Anxiety: No  Sleepiness: No  Speech  difficulty: No  Sweats: No  Tremors: Yes     Reducing Risks  Has dilated eye exam at least once a year?:  (appointment in the next few days)     Patient Activation Measure Survey Score:  JAYA Score (Last Two) 1/28/2013 11/13/2014   JAYA Raw Score 42 39   Activation Score 66 56.4   JAYA Level 3 3       ASSESSMENT:  His fear of hypoglycemia leads to missed Novolog doses    Goals        General    Medical (pt-stated)     Notes - Note created  8/23/2018  1:07 PM by Suyapa Dias RN    Goal Statement: Test blood sugar two hours after dinner  Measure of Success: blood sugar data when he returns  Supportive Steps to Achieve: set the meter on the kitchen table  Barriers: does not like needles  Strengths: renewed interest in diabetes  Date to Achieve By: 9/11/18  Patient expressed understanding of goal: yes                Patient's most recent   Lab Results   Component Value Date    A1C 9.8 09/06/2017    is not meeting goal of <7.0  Education provided today on:    His meter was downloaded and results were discussed.  Morning numbers look great.  He is missing Novolog and this is causing blood sugar to rise throughout the day.  We worked on trying to address his fears about low blood sugar today.      He acted interested in trying the V-Go when he was shown how to do it in the office last visit.  He has now decided that he is not interested.  He is afraid of low blood sugar.    We will work on getting him a Navjot Pro which helps him to feel safer and therefore able to take his Novolog.    PLAN:  Paperwork for Navjot sent to [employer] today--they will bring it in to start it in the office.  See Patient Instructions for co-developed, patient-stated behavior change goals.  AVS printed and provided to patient today. See Follow-Up section for recommended follow-up.    Time Spent: 60 minutes  Encounter Type: Individual    Any diabetes medication dose changes were made via the CDE Protocol and Collaborative Practice Agreement with the  patient's referring provider. A copy of this encounter was shared with the provider.

## 2018-09-14 ENCOUNTER — MEDICAL CORRESPONDENCE (OUTPATIENT)
Dept: HEALTH INFORMATION MANAGEMENT | Facility: CLINIC | Age: 63
End: 2018-09-14

## 2018-09-18 ENCOUNTER — OFFICE VISIT (OUTPATIENT)
Dept: OPHTHALMOLOGY | Facility: CLINIC | Age: 63
End: 2018-09-18
Payer: MEDICARE

## 2018-09-18 DIAGNOSIS — Z79.4 TYPE 2 DIABETES MELLITUS WITH DIABETIC NEPHROPATHY, WITH LONG-TERM CURRENT USE OF INSULIN (H): Primary | ICD-10-CM

## 2018-09-18 DIAGNOSIS — H52.4 PRESBYOPIA OF BOTH EYES: ICD-10-CM

## 2018-09-18 DIAGNOSIS — H02.401 PTOSIS OF RIGHT EYELID: ICD-10-CM

## 2018-09-18 DIAGNOSIS — E11.21 TYPE 2 DIABETES MELLITUS WITH DIABETIC NEPHROPATHY, WITH LONG-TERM CURRENT USE OF INSULIN (H): Primary | ICD-10-CM

## 2018-09-18 DIAGNOSIS — H26.9 CORTICAL CATARACT OF BOTH EYES: ICD-10-CM

## 2018-09-18 DIAGNOSIS — E11.3393 MODERATE NONPROLIFERATIVE DIABETIC RETINOPATHY OF BOTH EYES WITHOUT MACULAR EDEMA ASSOCIATED WITH TYPE 2 DIABETES MELLITUS (H): ICD-10-CM

## 2018-09-18 PROCEDURE — 92014 COMPRE OPH EXAM EST PT 1/>: CPT | Performed by: STUDENT IN AN ORGANIZED HEALTH CARE EDUCATION/TRAINING PROGRAM

## 2018-09-18 PROCEDURE — 92015 DETERMINE REFRACTIVE STATE: CPT | Mod: GY | Performed by: STUDENT IN AN ORGANIZED HEALTH CARE EDUCATION/TRAINING PROGRAM

## 2018-09-18 ASSESSMENT — EXTERNAL EXAM - RIGHT EYE: OD_EXAM: NORMAL

## 2018-09-18 ASSESSMENT — REFRACTION_MANIFEST
OS_ADD: +2.50
OS_SPHERE: -1.00
OS_AXIS: 180
OD_SPHERE: -0.50
OD_CYLINDER: SPHERE
OS_CYLINDER: +1.00
OD_ADD: +2.50

## 2018-09-18 ASSESSMENT — EXTERNAL EXAM - LEFT EYE: OS_EXAM: NORMAL

## 2018-09-18 ASSESSMENT — CUP TO DISC RATIO
OD_RATIO: 0.2
OS_RATIO: 0.2

## 2018-09-18 ASSESSMENT — VISUAL ACUITY
OD_SC: 20/50+2
OS_SC: 20/40-2
METHOD: SNELLEN - LINEAR

## 2018-09-18 ASSESSMENT — CONF VISUAL FIELD
OD_NORMAL: 1
OS_NORMAL: 1

## 2018-09-18 ASSESSMENT — SLIT LAMP EXAM - LIDS
COMMENTS: NORMAL
COMMENTS: 1+ PTOSIS

## 2018-09-18 ASSESSMENT — TONOMETRY
IOP_METHOD: APPLANATION
OD_IOP_MMHG: 12
OS_IOP_MMHG: 12

## 2018-09-18 NOTE — PATIENT INSTRUCTIONS
Use over the counter readers (around +2.00) or fill prescription for glasses  Keep blood sugars and blood pressure under good control.    Annette Mancera MD  [phone]    Diabetes weakens the blood vessels all over the body, including the eyes. Damage to the blood vessels in the eyes can cause swelling or bleeding into part of the eye (called the retina). This is called diabetic retinopathy (SUKHJINDER-tin--[facility]-thee). If not treated, this disease can cause vision loss or blindness.   Symptoms may include blurred or distorted vision, but many people have no symptoms. It's important to see your eye doctor regularly to check for problems.   Early treatment and good control can help protect your vision. Here are the things you can do to help prevent vision loss:      1. Keep your blood sugar levels under tight control.      2. Bring high blood pressure under control.      3. No smoking.      4. Have yearly dilated eye exams.

## 2018-09-18 NOTE — MR AVS SNAPSHOT
After Visit Summary   9/18/2018    Lucian Oliveira    MRN: [id number]           Patient Information     Date Of Birth          1955        Visit Information        Provider Department      9/18/2018 9:00 AM Annette Mancera MD; [facility] SERVICES [facility]        Today's Diagnoses     Type 2 diabetes mellitus with diabetic nephropathy, with long-term current use of insulin (H)    -  1    Cortical cataract of both eyes        Ptosis of right eyelid        Presbyopia of both eyes          Care Instructions    Use over the counter readers (around +2.00) or fill prescription for glasses  Keep blood sugars and blood pressure under good control.    Annette Mancera MD  [phone]    Diabetes weakens the blood vessels all over the body, including the eyes. Damage to the blood vessels in the eyes can cause swelling or bleeding into part of the eye (called the retina). This is called diabetic retinopathy (SUKHJINDER-tin--puh-thee). If not treated, this disease can cause vision loss or blindness.   Symptoms may include blurred or distorted vision, but many people have no symptoms. It's important to see your eye doctor regularly to check for problems.   Early treatment and good control can help protect your vision. Here are the things you can do to help prevent vision loss:      1. Keep your blood sugar levels under tight control.      2. Bring high blood pressure under control.      3. No smoking.      4. Have yearly dilated eye exams.            Follow-ups after your visit        Follow-up notes from your care team     Return in about 1 year (around 9/18/2019) for Complete Exam.      Your next 10 appointments already scheduled     Oct 03, 2018  6:45 AM CDT   Lab with [facility] ([facility] [place])    [address] [place] [id number] [phone]            Oct 03, 2018  7:50 AM CDT   (Arrive by 7:20 AM)   Return Visit with  Mónica Aravind Shelton MD   [facility] ([facility])    [address]  [address]  [facility] [id number] [phone]            Oct 16, 2018  8:40 AM CDT   Office Visit with Anna Archuleta PA-C   [facility] [place] ([facility])    [address]  [place] [address] [phone]           Bring a current list of meds and any records pertaining to this visit. For Physicals, please bring immunization records and any forms needing to be filled out. Please arrive 10 minutes early to complete paperwork.            Oct 22, 2018  2:00 PM CDT   Lab with [facility]   [facility] ([facility])    [address] [place] [address]  1st Floor  [facility] [id number] [phone]            Oct 22, 2018  2:30 PM CDT   (Arrive by 2:15 PM)   Implanted Defibulator with Uc Cv Device 1   [facility] ([facility])    [address] [place] [address]  3rd Floor  [id number]               Oct 22, 2018  3:00 PM CDT   (Arrive by 2:45 PM)   RETURN HEART FAILURE with Arvin Sheridan MD   [facility] ([facility])    [address] [place] [address]  [address]  [facility] [id number] [phone]              Who to contact     If you have questions or need follow up information about today's clinic visit or your schedule please contact [facility] directly at [phone].  Normal or non-critical lab and imaging results will be communicated to you by MyChart, letter or phone within 4 business days after the clinic has received the results. If you do not hear from us within 7 days, please contact the clinic through MyChart or phone. If you have a critical or abnormal lab result, we will notify you by phone as soon as possible.  Submit refill requests through [employer] or call your pharmacy and they will forward the refill request to us. Please allow 3 business days for your refill to  be completed.          Additional Information About Your Visit        Inside Jobshart Information     [employer] gives you secure access to your electronic health record. If you see a primary care provider, you can also send messages to your care team and make appointments. If you have questions, please call your primary care clinic.  If you do not have a primary care provider, please call [phone] and they will assist you.        Care EveryWhere ID     This is your Care EveryWhere ID. This could be used by other organizations to access your [facility] medical records  [id number]         Blood Pressure from Last 3 Encounters:   08/16/18 108/68   08/06/18 110/71   04/30/18 105/65    Weight from Last 3 Encounters:   08/16/18 90.3 kg (199 lb)   08/06/18 91.5 kg (201 lb 11.2 oz)   04/30/18 89.4 kg (197 lb)              We Performed the Following     EYE EXAM (SIMPLE-NONBILLABLE)     REFRACTIVE STATUS        Primary Care Provider Office Phone # Fax #    Anna Archuleta PA-C [phone]       [address] [facility] [address]        Goals        General    Medical (pt-stated)     Notes - Note created  8/23/2018  1:07 PM by Suyapa Dias, RN    Goal Statement: Test blood sugar two hours after dinner  Measure of Success: blood sugar data when he returns  Supportive Steps to Achieve: set the meter on the kitchen table  Barriers: does not like needles  Strengths: renewed interest in diabetes  Date to Achieve By: 9/11/18  Patient expressed understanding of goal: yes            Equal Access to Services     TAMMY RUSH AH: Hadii aad ku hadasho Soomaali, waaxda luqadaha, qaybta kaalmada adeegyada, joana pulido. So [facility] [phone].    ATENCIÓN: Si habla español, tiene a lainez disposición servicios gratuitos de asistencia lingüística. Llame al [phone].    We comply with applicable federal civil rights laws and [place] laws. We do not discriminate on the basis of race, color,  national origin, age, disability, sex, sexual orientation, or gender identity.            Thank you!     Thank you for choosing [facility] [place]  for your care. Our goal is always to provide you with excellent care. Hearing back from our patients is one way we can continue to improve our services. Please take a few minutes to complete the written survey that you may receive in the mail after your visit with us. Thank you!             Your Updated Medication List - Protect others around you: Learn how to safely use, store and throw away your medicines at www.disposemymeds.org.          This list is accurate as of 9/18/18 12:06 PM.  Always use your most recent med list.                   Brand Name Dispense Instructions for use Diagnosis    aspirin 81 MG tablet     30 tablet    Take 1 tablet (81 mg) by mouth daily    Ischemic cardiomyopathy       atorvastatin 40 MG tablet    LIPITOR    90 tablet    Take 1 tablet (40 mg) by mouth daily    Chronic systolic heart failure (H), Wheezing, CKD (chronic kidney disease) stage 3, GFR 30-59 ml/min, CHF (NYHA class II, ACC/AHA stage C) (H)       B-D U/F 31G X 5 MM   Generic drug:  insulin pen needle     100 each    USE 1 NEEDLE DAILY AS DIRECTED -DUE FOR APPOINTMENT FOR FURTHER REFILLS    Type 2 diabetes mellitus with diabetic nephropathy, with long-term current use of insulin (H)       blood glucose monitoring meter device kit     1 kit    Use to test blood sugar 3-4 times daily, as directed.    Type 2 diabetes mellitus with diabetic nephropathy, with long-term current use of insulin (H)       blood glucose monitoring test strip    ACCU-CHEK SMARTVIEW    100 each    Use to test blood sugar three times daily or as directed. Please schedule follow up for further details.    Type 2 diabetes mellitus with diabetic nephropathy, with long-term current use of insulin (H)       carvedilol 25 MG tablet    COREG    180 tablet    Take 1 tablet (25 mg) by mouth 2 times daily (with  meals)    Hypertension goal BP (blood pressure) < 140/90       clopidogrel 75 MG tablet    PLAVIX    30 tablet    TAKE ONE TABLET BY MOUTH EVERY DAY, DUE FOR APPOINTMENT    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 (H)       continuous blood glucose monitoring sensor     9 each    For use with Freestyle Navjot Flash  for continuous monitioring of blood glucose levels. Replace sensor every 10 days.    Type 2 diabetes mellitus with hyperglycemia, with long-term current use of insulin (H)       furosemide 40 MG tablet    LASIX    100 tablet    Take 1 tablet (40 mg) by mouth daily - Take an extra 40mg as needed if weight goes up 2 pounds overnight, or more than 5 pounds in 1 week.    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 (H)       * LANTUS SOLOSTAR 100 UNIT/ML injection   Generic drug:  insulin glargine     15 mL    INJECT 40 UNITS UNDER THE SKIN AT BEDTIME (PLEASE MAKE APPOINTMENT FOR FURTHER REFILLS)    Type 2 diabetes mellitus with diabetic nephropathy, with long-term current use of insulin (H)       * insulin glargine 100 UNIT/ML injection    LANTUS SOLOSTAR    45 mL    Take 8 units in the morning and 40 units in the evening    Type 2 diabetes mellitus with diabetic nephropathy, with long-term current use of insulin (H)       losartan 50 MG tablet    COZAAR    90 tablet    Take 1 tablet (50 mg) by mouth daily    CKD (chronic kidney disease) stage 3, GFR 30-59 ml/min, CHF (NYHA class II, ACC/AHA stage C) (H), Chronic  systolic heart failure (H), Wheezing       nitroGLYcerin 0.4 MG sublingual tablet    NITROSTAT    10 tablet    Place 1 tablet (0.4 mg) under the tongue every 5 minutes as needed for chest pain If you are still having symptoms after 3 doses (15 minutes) call 911.    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 (H)       NovoLOG FLEXPEN 100 UNIT/ML injection   Generic drug:  insulin aspart     3 mL    10 units before lunch, 10 units before dinner        order for DME     1 Units    Auto CPAP 8-15 cmH20        sitagliptin 50 MG tablet    JANUVIA    90 tablet    Take 1 tablet (50 mg) by mouth daily    Type 2 diabetes mellitus with hyperglycemia, with long-term current use of insulin (H), Type 2 diabetes mellitus with stage 3 chronic kidney disease, with long-term current use of insulin (H)       * Notice:  This list has 2 medication(s) that are the same as other medications prescribed for you. Read the directions carefully, and ask your doctor or other care provider to review them with you.

## 2018-09-18 NOTE — LETTER
9/18/2018         RE: Lucian Oliveira  [address] [address]        Dear Colleague,    Thank you for referring your patient, Lucian Oliveira, to the [facility].     He has stable moderate diabetic retinopathy in both eyes.  Please see a copy of my visit note below.     Current Eye Medications:  no     Subjective:  Diabetic eye exam  Pt reports his distance vision becoming more blurry, sees ok at near. He does not wear any type of glasses.    Lab Results   Component Value Date    A1C 9.8 09/06/2017    A1C 9.1 07/05/2017    A1C 10.5 04/27/2017    A1C 9.8 02/07/2017    A1C 10.7 01/05/2017        Objective:  See Ophthalmology Exam.       Assessment:  Lucian Oliveira is a 62 year old male who presents with:   Encounter Diagnoses   Name Primary?     Type 2 diabetes mellitus with diabetic nephropathy, with long-term current use of insulin (H)      Moderate nonproliferative diabetic retinopathy of both eyes without macular edema associated with type 2 diabetes mellitus (H) Stable moderate nonproliferative diabetic retinopathy in both eyes.     Cortical cataract of both eyes      Ptosis of right eyelid        Plan:  Use over the counter readers (around +2.00) or fill prescription for glasses  Keep blood sugars and blood pressure under good control.    Annette Mancera MD  [phone]        Again, thank you for allowing me to participate in the care of your patient.        Sincerely,        Annette Mancera MD

## 2018-09-18 NOTE — PROGRESS NOTES
Current Eye Medications:  no     Subjective:  Diabetic eye exam  Pt reports his distance vision becoming more blurry, sees ok at near. He does not wear any type of glasses.    Lab Results   Component Value Date    A1C 9.8 09/06/2017    A1C 9.1 07/05/2017    A1C 10.5 04/27/2017    A1C 9.8 02/07/2017    A1C 10.7 01/05/2017        Objective:  See Ophthalmology Exam.       Assessment:  Lucian Oliveira is a 62 year old male who presents with:   Encounter Diagnoses   Name Primary?     Type 2 diabetes mellitus with diabetic nephropathy, with long-term current use of insulin (H)      Moderate nonproliferative diabetic retinopathy of both eyes without macular edema associated with type 2 diabetes mellitus (H) Stable moderate nonproliferative diabetic retinopathy in both eyes.     Cortical cataract of both eyes      Ptosis of right eyelid        Plan:  Use over the counter readers (around +2.00) or fill prescription for glasses  Keep blood sugars and blood pressure under good control.    Annette Mancera MD  [phone]

## 2018-10-01 DIAGNOSIS — N18.30 CKD (CHRONIC KIDNEY DISEASE) STAGE 3, GFR 30-59 ML/MIN (H): Primary | Chronic | ICD-10-CM

## 2018-10-02 ENCOUNTER — TELEPHONE (OUTPATIENT)
Dept: NEPHROLOGY | Facility: CLINIC | Age: 63
End: 2018-10-02

## 2018-10-03 ENCOUNTER — OFFICE VISIT (OUTPATIENT)
Dept: NEPHROLOGY | Facility: CLINIC | Age: 63
End: 2018-10-03
Attending: INTERNAL MEDICINE
Payer: MEDICARE

## 2018-10-03 VITALS
WEIGHT: 198 LBS | DIASTOLIC BLOOD PRESSURE: 75 MMHG | BODY MASS INDEX: 32.99 KG/M2 | SYSTOLIC BLOOD PRESSURE: 112 MMHG | HEIGHT: 65 IN | HEART RATE: 75 BPM | OXYGEN SATURATION: 98 % | RESPIRATION RATE: 16 BRPM | TEMPERATURE: 98.1 F

## 2018-10-03 DIAGNOSIS — I10 HYPERTENSION GOAL BP (BLOOD PRESSURE) < 140/90: Chronic | ICD-10-CM

## 2018-10-03 DIAGNOSIS — I50.9 CHF (NYHA CLASS II, ACC/AHA STAGE C) (H): Chronic | ICD-10-CM

## 2018-10-03 DIAGNOSIS — Z79.4 TYPE 2 DIABETES MELLITUS WITH DIABETIC NEPHROPATHY, WITH LONG-TERM CURRENT USE OF INSULIN (H): Chronic | ICD-10-CM

## 2018-10-03 DIAGNOSIS — Z23 NEED FOR PROPHYLACTIC VACCINATION AND INOCULATION AGAINST INFLUENZA: Primary | ICD-10-CM

## 2018-10-03 DIAGNOSIS — E11.21 TYPE 2 DIABETES MELLITUS WITH DIABETIC NEPHROPATHY, WITH LONG-TERM CURRENT USE OF INSULIN (H): Chronic | ICD-10-CM

## 2018-10-03 DIAGNOSIS — N25.81 SECONDARY RENAL HYPERPARATHYROIDISM (H): ICD-10-CM

## 2018-10-03 DIAGNOSIS — N18.30 CKD (CHRONIC KIDNEY DISEASE) STAGE 3, GFR 30-59 ML/MIN (H): Chronic | ICD-10-CM

## 2018-10-03 DIAGNOSIS — N18.4 CKD (CHRONIC KIDNEY DISEASE) STAGE 4, GFR 15-29 ML/MIN (H): ICD-10-CM

## 2018-10-03 LAB
ALBUMIN SERPL-MCNC: 3.2 G/DL (ref 3.4–5)
ANION GAP SERPL CALCULATED.3IONS-SCNC: 5 MMOL/L (ref 3–14)
BUN SERPL-MCNC: 41 MG/DL (ref 7–30)
CALCIUM SERPL-MCNC: 8.4 MG/DL (ref 8.5–10.1)
CHLORIDE SERPL-SCNC: 104 MMOL/L (ref 94–109)
CO2 SERPL-SCNC: 28 MMOL/L (ref 20–32)
CREAT SERPL-MCNC: 2.33 MG/DL (ref 0.66–1.25)
CREAT UR-MCNC: 168 MG/DL
DEPRECATED CALCIDIOL+CALCIFEROL SERPL-MC: 23 UG/L (ref 20–75)
GFR SERPL CREATININE-BSD FRML MDRD: 28 ML/MIN/1.7M2
GLUCOSE SERPL-MCNC: 141 MG/DL (ref 70–99)
HGB BLD-MCNC: 13.1 G/DL (ref 13.3–17.7)
MAGNESIUM SERPL-MCNC: 2.1 MG/DL (ref 1.6–2.3)
MICROALBUMIN UR-MCNC: 6 MG/L
MICROALBUMIN/CREAT UR: 3.74 MG/G CR (ref 0–17)
PHOSPHATE SERPL-MCNC: 2.9 MG/DL (ref 2.5–4.5)
POTASSIUM SERPL-SCNC: 4.5 MMOL/L (ref 3.4–5.3)
PROT UR-MCNC: 0.1 G/L
PROT/CREAT 24H UR: 0.06 G/G CR (ref 0–0.2)
SODIUM SERPL-SCNC: 137 MMOL/L (ref 133–144)

## 2018-10-03 PROCEDURE — 82043 UR ALBUMIN QUANTITATIVE: CPT | Performed by: INTERNAL MEDICINE

## 2018-10-03 PROCEDURE — 25000128 H RX IP 250 OP 636: Mod: ZF | Performed by: INTERNAL MEDICINE

## 2018-10-03 PROCEDURE — 82306 VITAMIN D 25 HYDROXY: CPT | Performed by: INTERNAL MEDICINE

## 2018-10-03 PROCEDURE — T1013 SIGN LANG/ORAL INTERPRETER: HCPCS | Mod: U3

## 2018-10-03 PROCEDURE — 85018 HEMOGLOBIN: CPT | Performed by: INTERNAL MEDICINE

## 2018-10-03 PROCEDURE — 90686 IIV4 VACC NO PRSV 0.5 ML IM: CPT | Mod: ZF | Performed by: INTERNAL MEDICINE

## 2018-10-03 PROCEDURE — 83735 ASSAY OF MAGNESIUM: CPT | Performed by: INTERNAL MEDICINE

## 2018-10-03 PROCEDURE — 80069 RENAL FUNCTION PANEL: CPT | Performed by: INTERNAL MEDICINE

## 2018-10-03 PROCEDURE — G0008 ADMIN INFLUENZA VIRUS VAC: HCPCS | Mod: ZF

## 2018-10-03 PROCEDURE — G0463 HOSPITAL OUTPT CLINIC VISIT: HCPCS | Mod: 25,ZF

## 2018-10-03 PROCEDURE — 84156 ASSAY OF PROTEIN URINE: CPT | Performed by: INTERNAL MEDICINE

## 2018-10-03 PROCEDURE — 36415 COLL VENOUS BLD VENIPUNCTURE: CPT | Performed by: INTERNAL MEDICINE

## 2018-10-03 RX ADMIN — INFLUENZA A VIRUS A/MICHIGAN/45/2015 X-275 (H1N1) ANTIGEN (FORMALDEHYDE INACTIVATED), INFLUENZA A VIRUS A/SINGAPORE/INFIMH-16-0019/2016 IVR-186 (H3N2) ANTIGEN (FORMALDEHYDE INACTIVATED), INFLUENZA B VIRUS B/PHUKET/3073/2013 ANTIGEN (FORMALDEHYDE INACTIVATED), AND INFLUENZA B VIRUS B/MARYLAND/15/2016 BX-69A ANTIGEN (FORMALDEHYDE INACTIVATED) 0.5 ML: 15; 15; 15; 15 INJECTION, SUSPENSION INTRAMUSCULAR at 08:41

## 2018-10-03 ASSESSMENT — PAIN SCALES - GENERAL: PAINLEVEL: NO PAIN (0)

## 2018-10-03 NOTE — NURSING NOTE
"Chief Complaint   Patient presents with     RECHECK     CKD       /75  Pulse 75  Temp 98.1  F (36.7  C) (Oral)  Resp 16  Ht 1.638 m (5' 4.5\")  Wt 89.8 kg (198 lb)  SpO2 98%  BMI 33.46 kg/m2      Injectable Influenza Immunization Documentation    1.  Has the patient received the information for the injectable influenza vaccine? YES     2. Is the patient 6 months of age or older? YES     3. Does the patient have any of the following contraindications?         Severe allergy to eggs? No     Severe allergic reaction to previous influenza vaccines? No   Severe allergy to latex? No       History of Guillain-Oakdale syndrome? No     Currently have a temperature greater than 100.4F? No        4.  Severely egg allergic patients should have flu vaccine eligibility assessed by an MD, RN, or pharmacist, and those who received flu vaccine should be observed for 15 min by an MD, RN, Pharmacist, Medical Technician, or member of clinic staff.\": YES    5. Latex-allergic patients should be given latex-free influenza vaccine Yes. Please reference the Vaccine latex table to determine if your clinic s product is latex-containing.       Vaccination given by Cierra Moyer [facility]  10/3/2018 7:54 AM                "

## 2018-10-03 NOTE — MR AVS SNAPSHOT
After Visit Summary   10/3/2018    Lucian Oliveira    MRN: [id number]           Patient Information     Date Of Birth          1955        Visit Information        Provider Department      10/3/2018 7:20 AM Trice Hernandez; Mónica Shelton MD [facility]        Today's Diagnoses     Need for prophylactic vaccination and inoculation against influenza    -  1    CKD (chronic kidney disease) stage 4, GFR 15-29 ml/min (H)        Secondary renal hyperparathyroidism (H)        CHF (NYHA class II, ACC/AHA stage C) (H)        Hypertension goal BP (blood pressure) < 140/90           Follow-ups after your visit        Follow-up notes from your care team     Return in about 8 months (around 6/3/2019).      Your next 10 appointments already scheduled     Oct 16, 2018  8:40 AM CDT   Office Visit with Anna Archuleta PA-C   [facility]    [address] [place] [id number] [phone]           Bring a current list of meds and any records pertaining to this visit. For Physicals, please bring immunization records and any forms needing to be filled out. Please arrive 10 minutes early to complete paperwork.            Oct 22, 2018  2:00 PM CDT   Lab with UC LAB   [facility])    [address] [place] [address]  1st Floor  [facility] [place] [address] [phone]            Oct 22, 2018  2:30 PM CDT   (Arrive by 2:15 PM)   Implanted Defibulator with Uc Cv Device 1   [facility] ([facility])    [address] [place] [address] Floor  [id number]               Oct 22, 2018  3:00 PM CDT   (Arrive by 2:45 PM)   RETURN HEART FAILURE with Arvin Sheridan MD   [facility])    [address] [place] [address]  [address]  [facility] [place] [address] [phone]              Who to contact     If you have questions or need follow up information about today's  "clinic visit or your schedule please contact [facility] directly at [phone].  Normal or non-critical lab and imaging results will be communicated to you by MyChart, letter or phone within 4 business days after the clinic has received the results. If you do not hear from us within 7 days, please contact the clinic through [employer] or phone. If you have a critical or abnormal lab result, we will notify you by phone as soon as possible.  Submit refill requests through [facility] in [facility] or call your pharmacy and they will forward the refill request to us. Please allow 3 business days for your refill to be completed.          Additional Information About Your Visit        [employer] Information     [facility] in [facility] gives you secure access to your electronic health record. If you see a primary care provider, you can also send messages to your care team and make appointments. If you have questions, please call your primary care clinic.  If you do not have a primary care provider, please call [phone] and they will assist you.        Care EveryWhere ID     This is your Care EveryWhere ID. This could be used by other organizations to access your [place] medical records  [id number]        Your Vitals Were     Pulse Temperature Respirations Height Pulse Oximetry BMI (Body Mass Index)    75 98.1  F (36.7  C) (Oral) 16 1.638 m (5' 4.5\") 98% 33.46 kg/m2       Blood Pressure from Last 3 Encounters:   10/03/18 112/75   08/16/18 108/68   08/06/18 110/71    Weight from Last 3 Encounters:   10/03/18 89.8 kg (198 lb)   08/16/18 90.3 kg (199 lb)   08/06/18 91.5 kg (201 lb 11.2 oz)              Today, you had the following     No orders found for display       Primary Care Provider Office Phone # Fax #    Anna Archuleta PA-C [phone]       [address] [place] [address]        Goals        General    Medical (pt-stated)     Notes - Note created  8/23/2018  1:07 PM by Suyapa Dias RN    Goal Statement: Test " blood sugar two hours after dinner  Measure of Success: blood sugar data when he returns  Supportive Steps to Achieve: set the meter on the kitchen table  Barriers: does not like needles  Strengths: renewed interest in diabetes  Date to Achieve By: 9/11/18  Patient expressed understanding of goal: yes            Equal Access to Services     MARIBETH RUSH AH: Hadii aad ku hadlindajohanne Rebeccaadina, wamuluda luqadaha, qaybta kaalmada leonelashaniceto, joana mcclellanalconlucia pulido. So [facility] [phone].    ATENCIÓN: Si habla español, tiene a lainez disposición servicios gratuitos de asistencia lingüística. Llame al [phone].    We comply with applicable federal civil rights laws and [place] laws. We do not discriminate on the basis of race, color, national origin, age, disability, sex, sexual orientation, or gender identity.            Thank you!     Thank you for choosing [facility]  for your care. Our goal is always to provide you with excellent care. Hearing back from our patients is one way we can continue to improve our services. Please take a few minutes to complete the written survey that you may receive in the mail after your visit with us. Thank you!             Your Updated Medication List - Protect others around you: Learn how to safely use, store and throw away your medicines at www.disposemymeds.org.          This list is accurate as of 10/3/18 10:37 AM.  Always use your most recent med list.                   Brand Name Dispense Instructions for use Diagnosis    aspirin 81 MG tablet     30 tablet    Take 1 tablet (81 mg) by mouth daily    Ischemic cardiomyopathy       atorvastatin 40 MG tablet    LIPITOR    90 tablet    Take 1 tablet (40 mg) by mouth daily    Chronic systolic heart failure (H), Wheezing, CKD (chronic kidney disease) stage 3, GFR 30-59 ml/min (H), CHF (NYHA class II, ACC/AHA stage C) (H)       B-D U/F 31G X 5 MM   Generic drug:  insulin pen needle     100 each    USE 1 NEEDLE DAILY AS  DIRECTED -DUE FOR APPOINTMENT FOR FURTHER REFILLS    Type 2 diabetes mellitus with diabetic nephropathy, with long-term current use of insulin (H)       blood glucose monitoring meter device kit     1 kit    Use to test blood sugar 3-4 times daily, as directed.    Type 2 diabetes mellitus with diabetic nephropathy, with long-term current use of insulin (H)       blood glucose monitoring test strip    ACCU-CHEK SMARTVIEW    100 each    Use to test blood sugar three times daily or as directed. Please schedule follow up for further details.    Type 2 diabetes mellitus with diabetic nephropathy, with long-term current use of insulin (H)       carvedilol 25 MG tablet    COREG    180 tablet    Take 1 tablet (25 mg) by mouth 2 times daily (with meals)    Hypertension goal BP (blood pressure) < 140/90       clopidogrel 75 MG tablet    PLAVIX    30 tablet    TAKE ONE TABLET BY MOUTH EVERY DAY, DUE FOR APPOINTMENT    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 (H)       continuous blood glucose monitoring sensor     9 each    For use with Freestyle Navjot Flash  for continuous monitioring of blood glucose levels. Replace sensor every 10 days.    Type 2 diabetes mellitus with hyperglycemia, with long-term current use of insulin (H)       furosemide 40 MG tablet    LASIX    100 tablet    Take 1 tablet (40 mg) by mouth daily - Take an extra 40mg as needed if weight goes up 2 pounds overnight, or more than 5 pounds in 1 week.    CHF (NYHA class II, ACC/AHA stage C) (H)       glimepiride 4 MG tablet    AMARYL    180 tablet    Take 1 tablet (4 mg) by mouth every morning (before breakfast)    Type 2 diabetes mellitus with hyperglycemia, with long-term current use of insulin (H)       insulin glargine 100 UNIT/ML injection    LANTUS SOLOSTAR    45 mL    Take 8 units in the morning and 40 units in the evening    Type 2 diabetes mellitus with diabetic nephropathy, with  long-term current use of insulin (H)       isosorbide mononitrate 30 MG 24 hr tablet    IMDUR    135 tablet    TAKE ONE AND ONE-HALF TABLETS BY MOUTH ONCE DAILY    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 (H)       NovoLOG FLEXPEN 100 UNIT/ML injection   Generic drug:  insulin aspart     3 mL    10 units before lunch, 10 units before dinner        order for DME     1 Units    Auto CPAP 8-15 cmH20        sitagliptin 50 MG tablet    JANUVIA    90 tablet    Take 1 tablet (50 mg) by mouth daily    Type 2 diabetes mellitus with hyperglycemia, with long-term current use of insulin (H), Type 2 diabetes mellitus with stage 3 chronic kidney disease, with long-term current use of insulin (H)

## 2018-10-03 NOTE — LETTER
October 8, 2018       TO: Lucian Oliveira  [address] [facility] [facility] [address]       Dear Mr. Oliveira,    We are writing to inform you of your test results. Labs as discussed in clinic, kidney function near 30%. Vitamin d slightly low so would recommend vitamin D 1000 or 2000mg daily (over the counter).        Resulted Orders   Renal panel   Result Value Ref Range    Sodium 137 133 - 144 mmol/L    Potassium 4.5 3.4 - 5.3 mmol/L    Chloride 104 94 - 109 mmol/L    Carbon Dioxide 28 20 - 32 mmol/L    Anion Gap 5 3 - 14 mmol/L    Glucose 141 (H) 70 - 99 mg/dL    Urea Nitrogen 41 (H) 7 - 30 mg/dL    Creatinine 2.33 (H) 0.66 - 1.25 mg/dL    GFR Estimate 28 (L) >60 mL/min/1.7m2      Comment:      Non  GFR Calc    GFR Estimate If Black 34 (L) >60 mL/min/1.7m2      Comment:       GFR Calc    Calcium 8.4 (L) 8.5 - 10.1 mg/dL    Phosphorus 2.9 2.5 - 4.5 mg/dL    Albumin 3.2 (L) 3.4 - 5.0 g/dL   Hemoglobin   Result Value Ref Range    Hemoglobin 13.1 (L) 13.3 - 17.7 g/dL   Vitamin D Deficiency   Result Value Ref Range    Vitamin D Deficiency screening 23 20 - 75 ug/L      Comment:      Season, race, dietary intake, and treatment affect the concentration of   25-hydroxy-Vitamin D. Values may decrease during winter months and increase   during summer months. Values 20-29 ug/L may indicate Vitamin D insufficiency   and values <20 ug/L may indicate Vitamin D deficiency.  Vitamin D determination is routinely performed by an immunoassay specific for   25 hydroxyvitamin D3.  If an individual is on vitamin D2 (ergocalciferol)   supplementation, please specify 25 OH vitamin D2 and D3 level determination by   LCMSMS test [id number].     Protein  random urine with Creat Ratio   Result Value Ref Range    Protein Random Urine 0.10 g/L    Protein Total Urine g/gr Creatinine 0.06 0 - 0.2 g/g Cr   Magnesium   Result Value Ref Range    Magnesium 2.1 1.6 - 2.3 mg/dL     It was a pleasure to see you at your  recent visit. Please contact us at [phone] if you have any questions or concerns. Thank you, we look forward to seeing you at your next visit.       Sincerely,    Mónica Shelton MD.   of Medicine  Division of Kidney Disease and Hypertension  [address] [address]

## 2018-10-03 NOTE — LETTER
10/3/2018       RE: Lucian Oliveira  [address] [facility] [address]     Dear Colleague,    Thank you for referring your patient, Lucian Oliveira, to the [facility] at [facility]. Please see a copy of my visit note below.    Assessment and Plan:   62 year old male with history of diabetes mellitus since 1997 complicated by neuropathy, CAD s/p CABG in 2009, NSTEMI in 2/2015 with EF down from 45% to 8%, without viability or revascularizable targets.  He has CKD baseline 1.3-1.6 mg/dL but more recently 1.8-2mg/dL since 2016  1. CKD- his Scr had been 1.3-1.6 for years, likely due to prior DAYA at time of CABG along with diabetic nephropathy. He has significant albuminuria which is improved with good BP control and losartan  - Scr now 1.8-2.2 recently, likely due to progression of underlying kidney disease with eGFR from 35-40 to 28ml/min today. He does not seem hypovolemic.   - discussed with him good BP control, avoiding heart failure exacerbation, good diabetes control, weight control and staying active.  His cardiac status has been wonderfully stable, no hospitalizations since 2015  - managed by Dr Sheridan and cards at [facility]  2. Hypertension- well controlled on heart failure regimen . He is on furosemide 40mg, carvedilol 25mg bid, losartan 50mg,  - no changes made to regimen, he is euvolemic  3. Anemia- mild, monitor for now  4. Electrolytes- mild hyperkalemia at 4.8 - should monitor, off aldactone now.   - can consider veltassa if necessary.   - check magnesium given he was given supplements.  5. Proteinuria- likely diabetic nephropathy- low grade/ minimal at this time  6. Bone- PTH elevated at 180, check vitamin D    Assessment and plan was discussed with patient and he voiced his understanding and agreement.    Reason for Visit:  Lucian Oliveira is a 62 year old male with CKD from diabetes and CAD/ CHF, who presents for followup    HPI:  Mr. Oliveira is a  59-year-old gentleman with history of ischemic cardiomyopathy, class II-III, and LVEF of 8% noted in 2015 after MI in [place]. He had CABG in 1999 and in 2/2015, he developed chest pain, angiogram showed severe 3 vessel native CAD (all three vessels [LAD, LCx and RCA] reported to be 100% ocludded at their ostia). All his grafts were considered to be occluded except for LIMA-to-LAD, which was patent and supplied left-to-left, as well as, left-to-right collaterals. There were no targets suitable for revascularization and patient was put on medical therapy.  He established care with heart failure here in March 2015.  He underwent ICD placement by Dr Mills on 4/2/15  He was last hospitalized in June 2015 for heart failure but stable since.  As far as his renal function, his Scr has fluctuated between 1.2-1.6 going back several years, and he has albuminuria, and has been diabetic since 1997.  He was seen by Dr Elaine over a year ago and follows up today. His EF is improved to 20%  He is accompanied by his wife again today.  He is needing reading glasses but no retinopathy. He denies neuropathy.  He has some pain in legs with exercise but not consistently    ROS:   A comprehensive review of systems was obtained and negative, except as noted in the HPI or PMH.    Active Medical Problems:  Patient Active Problem List   Diagnosis     Coronary artery disease involving native coronary artery without angina pectoris     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)     Hyperlipidemia LDL goal <100     Hypertension goal BP (blood pressure) < 140/90     Advanced directives, counseling/discussion     CHF (NYHA class II, ACC/AHA stage C) (H)     CKD (chronic kidney disease) stage 3, GFR 30-59 ml/min (H)     Health Care Home     Automatic implantable cardioverter-defibrillator - [employer] single lead ICD, Not Dependent     Chronic systolic heart failure (H)     Proteinuria     Vitamin D deficiency     OA  (osteoarthritis) of knee     Chondromalacia of patella, unspecified laterality     Pain in joint of left shoulder     Tinnitus     Ptosis of right eyelid     Secondary renal hyperparathyroidism (H)     Cortical cataract of both eyes     Moderate nonproliferative diabetic retinopathy, without macular edema, associated with type 2 diabetes mellitus     Obesity     CHANTEL (obstructive sleep apnea)- severe (AHI 30)     PMH:   Medical record was reviewed and PMH was discussed with patient and noted below.  Past Medical History:   Diagnosis Date     NO ACTIVE PROBLEMS      PSH:   Past Surgical History:   Procedure Laterality Date     C CABG, ARTERY-VEIN, THREE  02/2008     IMPLANT AUTOMATIC IMPLANTABLE CARDIOVERTER DEFIBRILLATOR         Family Hx:   Family History   Problem Relation Age of Onset     Diabetes Brother      Diabetes Sister      Diabetes Sister      Macular Degeneration No family hx of      Glaucoma No family hx of      Myocardial Infarction No family hx of      KIDNEY DISEASE No family hx of      Personal Hx:   Social History     Social History     Marital status:      Spouse name: N/A     Number of children: 4     Years of education: N/A     Occupational History      [employer]      Retired     Social History Main Topics     Smoking status: Never Smoker     Smokeless tobacco: Never Used      Comment: Never smoked; non-smoking household     Alcohol use No     Drug use: No     Sexual activity: Yes     Partners: Female     Other Topics Concern     Parent/Sibling W/ Cabg, Mi Or Angioplasty Before 65f 55m? No     Social History Narrative     Current Outpatient Prescriptions   Medication     aspirin 81 MG tablet     atorvastatin (LIPITOR) 40 MG tablet     B-D U/F insulin pen needle     blood glucose monitoring (ACCU-CHEK FELIPE SMARTVIEW) meter device kit     blood glucose monitoring (ACCU-CHEK SMARTVIEW) test strip     carvedilol (COREG) 25 MG tablet     clopidogrel (PLAVIX) 75 MG tablet      "continuous blood glucose monitoring (FREESTYLE JEREMY) sensor     furosemide (LASIX) 40 MG tablet     glimepiride (AMARYL) 4 MG tablet     insulin glargine (LANTUS SOLOSTAR) 100 UNIT/ML pen     isosorbide mononitrate (IMDUR) 30 MG 24 hr tablet     losartan (COZAAR) 50 MG tablet     nitroglycerin (NITROSTAT) 0.4 MG SL tablet     NOVOLOG FLEXPEN 100 UNIT/ML soln     order for DME     sitagliptin (JANUVIA) 50 MG tablet     [DISCONTINUED] LANTUS SOLOSTAR 100 UNIT/ML soln     No current facility-administered medications for this visit.        Allergies:  Allergies   Allergen Reactions     No Known Drug Allergies    Vitals:  /75  Pulse 75  Temp 98.1  F (36.7  C) (Oral)  Resp 16  Ht 1.638 m (5' 4.5\")  Wt 89.8 kg (198 lb)  SpO2 98%  BMI 33.46 kg/m2    Exam:  GENERAL APPEARANCE: alert and no distress  HENT: mouth without ulcers or lesions  LYMPHATICS: no cervical or supraclavicular nodes  RESP: lungs clear to auscultation - no rales, rhonchi or wheezes  CV: regular rhythm, normal rate, no rub, no murmur  EDEMA: no significant LE edema in right leg, minimal on left  ABDOMEN: soft, nondistended, nontender, bowel sounds normal  MS: extremities normal - no gross deformities noted, no evidence of inflammation in joints, no muscle tenderness  SKIN: no rash  SKIN: healed incision midline      Results:  Recent Results (from the past 336 hour(s))   Renal panel    Collection Time: 10/03/18  7:23 AM   Result Value Ref Range    Sodium 137 133 - 144 mmol/L    Potassium 4.5 3.4 - 5.3 mmol/L    Chloride 104 94 - 109 mmol/L    Carbon Dioxide 28 20 - 32 mmol/L    Anion Gap 5 3 - 14 mmol/L    Glucose 141 (H) 70 - 99 mg/dL    Urea Nitrogen 41 (H) 7 - 30 mg/dL    Creatinine 2.33 (H) 0.66 - 1.25 mg/dL    GFR Estimate 28 (L) >60 mL/min/1.7m2    GFR Estimate If Black 34 (L) >60 mL/min/1.7m2    Calcium 8.4 (L) 8.5 - 10.1 mg/dL    Phosphorus 2.9 2.5 - 4.5 mg/dL    Albumin 3.2 (L) 3.4 - 5.0 g/dL   Hemoglobin    Collection Time: 10/03/18  " 7:23 AM   Result Value Ref Range    Hemoglobin 13.1 (L) 13.3 - 17.7 g/dL   Protein  random urine with Creat Ratio    Collection Time: 10/03/18  7:25 AM   Result Value Ref Range    Protein Random Urine 0.10 g/L    Protein Total Urine g/gr Creatinine 0.06 0 - 0.2 g/g Cr   Albumin Random Urine Quantitative with Creat Ratio    Collection Time: 10/03/18  7:25 AM   Result Value Ref Range    Creatinine Urine 168 mg/dL    Albumin Urine mg/L 6 mg/L    Albumin Urine mg/g Cr 3.74 0 - 17 mg/g Cr       Interpretation Summary  Moderate left ventricular dilation is present.  Severe diffuse hypokinesis is present.  The Ejection Fraction is estimated at 15-20%.  The inferior vena cava is normal.  No pericardial effusion is present.  Mónica Shelton MD   of Medicine  Department of Nephrology  [facility]

## 2018-10-03 NOTE — PROGRESS NOTES
Assessment and Plan:   62 year old male with history of diabetes mellitus since 1997 complicated by neuropathy, CAD s/p CABG in 2009, NSTEMI in 2/2015 with EF down from 45% to 8%, without viability or revascularizable targets.  He has CKD baseline 1.3-1.6 mg/dL but more recently 1.8-2mg/dL since 2016  1. CKD- his Scr had been 1.3-1.6 for years, likely due to prior DAYA at time of CABG along with diabetic nephropathy. He has significant albuminuria which is improved with good BP control and losartan  - Scr now 1.8-2.2 recently, likely due to progression of underlying kidney disease with eGFR from 35-40 to 28ml/min today. He does not seem hypovolemic.   - discussed with him good BP control, avoiding heart failure exacerbation, good diabetes control, weight control and staying active.  His cardiac status has been wonderfully stable, no hospitalizations since 2015  - managed by Dr Sheridan and cards at [facility]  2. Hypertension- well controlled on heart failure regimen . He is on furosemide 40mg, carvedilol 25mg bid, losartan 50mg,  - no changes made to regimen, he is euvolemic  3. Anemia- mild, monitor for now  4. Electrolytes- mild hyperkalemia at 4.8 - should monitor, off aldactone now.   - can consider veltassa if necessary.   - check magnesium given he was given supplements.  5. Proteinuria- likely diabetic nephropathy- low grade/ minimal at this time  6. Bone- PTH elevated at 180, check vitamin D    Assessment and plan was discussed with patient and he voiced his understanding and agreement.    Reason for Visit:  Lucian Oliveira is a 62 year old male with CKD from diabetes and CAD/ CHF, who presents for followup    HPI:  Mr. Oliveira is a 59-year-old gentleman with history of ischemic cardiomyopathy, class II-III, and LVEF of 8% noted in 2015 after MI in [place]. He had CABG in 1999 and in 2/2015, he developed chest pain, angiogram showed severe 3 vessel native CAD (all three vessels [LAD, LCx and RCA] reported to  be 100% ocludded at their ostia). All his grafts were considered to be occluded except for LIMA-to-LAD, which was patent and supplied left-to-left, as well as, left-to-right collaterals. There were no targets suitable for revascularization and patient was put on medical therapy.  He established care with heart failure here in March 2015.  He underwent ICD placement by Dr Mills on 4/2/15  He was last hospitalized in June 2015 for heart failure but stable since.  As far as his renal function, his Scr has fluctuated between 1.2-1.6 going back several years, and he has albuminuria, and has been diabetic since 1997.  He was seen by Dr Elaine over a year ago and follows up today. His EF is improved to 20%  He is accompanied by his wife again today.  He is needing reading glasses but no retinopathy. He denies neuropathy.  He has some pain in legs with exercise but not consistently    ROS:   A comprehensive review of systems was obtained and negative, except as noted in the HPI or PMH.    Active Medical Problems:  Patient Active Problem List   Diagnosis     Coronary artery disease involving native coronary artery without angina pectoris     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)     Hyperlipidemia LDL goal <100     Hypertension goal BP (blood pressure) < 140/90     Advanced directives, counseling/discussion     CHF (NYHA class II, ACC/AHA stage C) (H)     CKD (chronic kidney disease) stage 3, GFR 30-59 ml/min (H)     Health Care Home     Automatic implantable cardioverter-defibrillator - [employer] single lead ICD, Not Dependent     Chronic systolic heart failure (H)     Proteinuria     Vitamin D deficiency     OA (osteoarthritis) of knee     Chondromalacia of patella, unspecified laterality     Pain in joint of left shoulder     Tinnitus     Ptosis of right eyelid     Secondary renal hyperparathyroidism (H)     Cortical cataract of both eyes     Moderate nonproliferative diabetic retinopathy, without  macular edema, associated with type 2 diabetes mellitus     Obesity     CHANTEL (obstructive sleep apnea)- severe (AHI 30)     PMH:   Medical record was reviewed and PMH was discussed with patient and noted below.  Past Medical History:   Diagnosis Date     NO ACTIVE PROBLEMS      PSH:   Past Surgical History:   Procedure Laterality Date     C CABG, ARTERY-VEIN, THREE  02/2008     IMPLANT AUTOMATIC IMPLANTABLE CARDIOVERTER DEFIBRILLATOR         Family Hx:   Family History   Problem Relation Age of Onset     Diabetes Brother      Diabetes Sister      Diabetes Sister      Macular Degeneration No family hx of      Glaucoma No family hx of      Myocardial Infarction No family hx of      KIDNEY DISEASE No family hx of      Personal Hx:   Social History     Social History     Marital status:      Spouse name: N/A     Number of children: 4     Years of education: N/A     Occupational History      [employer]      Retired     Social History Main Topics     Smoking status: Never Smoker     Smokeless tobacco: Never Used      Comment: Never smoked; non-smoking household     Alcohol use No     Drug use: No     Sexual activity: Yes     Partners: Female     Other Topics Concern     Parent/Sibling W/ Cabg, Mi Or Angioplasty Before 65f 55m? No     Social History Narrative     Current Outpatient Prescriptions   Medication     aspirin 81 MG tablet     atorvastatin (LIPITOR) 40 MG tablet     B-D U/F insulin pen needle     blood glucose monitoring (ACCU-CHEK FELIPE SMARTVIEW) meter device kit     blood glucose monitoring (ACCU-CHEK SMARTVIEW) test strip     carvedilol (COREG) 25 MG tablet     clopidogrel (PLAVIX) 75 MG tablet     continuous blood glucose monitoring (FREESTYLE JEREMY) sensor     furosemide (LASIX) 40 MG tablet     glimepiride (AMARYL) 4 MG tablet     insulin glargine (LANTUS SOLOSTAR) 100 UNIT/ML pen     isosorbide mononitrate (IMDUR) 30 MG 24 hr tablet     losartan (COZAAR) 50 MG tablet      "nitroglycerin (NITROSTAT) 0.4 MG SL tablet     NOVOLOG FLEXPEN 100 UNIT/ML soln     order for DME     sitagliptin (JANUVIA) 50 MG tablet     [DISCONTINUED] LANTUS SOLOSTAR 100 UNIT/ML soln     No current facility-administered medications for this visit.        Allergies:  Allergies   Allergen Reactions     No Known Drug Allergies    Vitals:  /75  Pulse 75  Temp 98.1  F (36.7  C) (Oral)  Resp 16  Ht 1.638 m (5' 4.5\")  Wt 89.8 kg (198 lb)  SpO2 98%  BMI 33.46 kg/m2    Exam:  GENERAL APPEARANCE: alert and no distress  HENT: mouth without ulcers or lesions  LYMPHATICS: no cervical or supraclavicular nodes  RESP: lungs clear to auscultation - no rales, rhonchi or wheezes  CV: regular rhythm, normal rate, no rub, no murmur  EDEMA: no significant LE edema in right leg, minimal on left  ABDOMEN: soft, nondistended, nontender, bowel sounds normal  MS: extremities normal - no gross deformities noted, no evidence of inflammation in joints, no muscle tenderness  SKIN: no rash  SKIN: healed incision midline      Results:  Recent Results (from the past 336 hour(s))   Renal panel    Collection Time: 10/03/18  7:23 AM   Result Value Ref Range    Sodium 137 133 - 144 mmol/L    Potassium 4.5 3.4 - 5.3 mmol/L    Chloride 104 94 - 109 mmol/L    Carbon Dioxide 28 20 - 32 mmol/L    Anion Gap 5 3 - 14 mmol/L    Glucose 141 (H) 70 - 99 mg/dL    Urea Nitrogen 41 (H) 7 - 30 mg/dL    Creatinine 2.33 (H) 0.66 - 1.25 mg/dL    GFR Estimate 28 (L) >60 mL/min/1.7m2    GFR Estimate If Black 34 (L) >60 mL/min/1.7m2    Calcium 8.4 (L) 8.5 - 10.1 mg/dL    Phosphorus 2.9 2.5 - 4.5 mg/dL    Albumin 3.2 (L) 3.4 - 5.0 g/dL   Hemoglobin    Collection Time: 10/03/18  7:23 AM   Result Value Ref Range    Hemoglobin 13.1 (L) 13.3 - 17.7 g/dL   Protein  random urine with Creat Ratio    Collection Time: 10/03/18  7:25 AM   Result Value Ref Range    Protein Random Urine 0.10 g/L    Protein Total Urine g/gr Creatinine 0.06 0 - 0.2 g/g Cr   Albumin " Random Urine Quantitative with Creat Ratio    Collection Time: 10/03/18  7:25 AM   Result Value Ref Range    Creatinine Urine 168 mg/dL    Albumin Urine mg/L 6 mg/L    Albumin Urine mg/g Cr 3.74 0 - 17 mg/g Cr       Interpretation Summary  Moderate left ventricular dilation is present.  Severe diffuse hypokinesis is present.  The Ejection Fraction is estimated at 15-20%.  The inferior vena cava is normal.  No pericardial effusion is present.  Mónica Shelton MD   of Medicine  Department of Nephrology  [facility]

## 2018-10-16 ENCOUNTER — OFFICE VISIT (OUTPATIENT)
Dept: FAMILY MEDICINE | Facility: CLINIC | Age: 63
End: 2018-10-16
Payer: MEDICARE

## 2018-10-16 VITALS
WEIGHT: 199.6 LBS | DIASTOLIC BLOOD PRESSURE: 74 MMHG | RESPIRATION RATE: 18 BRPM | SYSTOLIC BLOOD PRESSURE: 121 MMHG | BODY MASS INDEX: 33.73 KG/M2 | TEMPERATURE: 97 F | OXYGEN SATURATION: 98 % | HEART RATE: 76 BPM

## 2018-10-16 DIAGNOSIS — E11.21 TYPE 2 DIABETES MELLITUS WITH DIABETIC NEPHROPATHY, WITH LONG-TERM CURRENT USE OF INSULIN (H): Primary | Chronic | ICD-10-CM

## 2018-10-16 DIAGNOSIS — I10 HYPERTENSION GOAL BP (BLOOD PRESSURE) < 140/90: Chronic | ICD-10-CM

## 2018-10-16 DIAGNOSIS — Z79.4 TYPE 2 DIABETES MELLITUS WITH DIABETIC NEPHROPATHY, WITH LONG-TERM CURRENT USE OF INSULIN (H): Primary | Chronic | ICD-10-CM

## 2018-10-16 DIAGNOSIS — I50.22 CHRONIC SYSTOLIC HEART FAILURE (H): Primary | Chronic | ICD-10-CM

## 2018-10-16 LAB — HBA1C MFR BLD: 7.6 % (ref 0–5.6)

## 2018-10-16 PROCEDURE — 99213 OFFICE O/P EST LOW 20 MIN: CPT | Performed by: PHYSICIAN ASSISTANT

## 2018-10-16 PROCEDURE — 83036 HEMOGLOBIN GLYCOSYLATED A1C: CPT | Performed by: PHYSICIAN ASSISTANT

## 2018-10-16 PROCEDURE — 36415 COLL VENOUS BLD VENIPUNCTURE: CPT | Performed by: PHYSICIAN ASSISTANT

## 2018-10-16 RX ORDER — CARVEDILOL 25 MG/1
25 TABLET ORAL 2 TIMES DAILY WITH MEALS
Qty: 180 TABLET | Refills: 3 | Status: SHIPPED | OUTPATIENT
Start: 2018-10-16 | End: 2018-10-22

## 2018-10-16 NOTE — PROGRESS NOTES
SUBJECTIVE:   Lucian Oliveira is a 63 year old male who presents to clinic today for the following health issues:      Diabetes Follow-up    Patient is checking blood sugars: once daily.  Results are as follows:         am -     Diabetic concerns: None     Symptoms of hypoglycemia (low blood sugar): shaky     Paresthesias (numbness or burning in feet) or sores: No     Date of last diabetic eye exam: 09/2018    Has not been using his meal time insulin  Sometimes feels lightheaded with sugars around 70 - only occurs every few weeks      BP Readings from Last 2 Encounters:   10/16/18 121/74   10/03/18 112/75     Hemoglobin A1C (%)   Date Value   10/16/2018 7.6 (H)   09/06/2017 9.8 (H)     LDL Cholesterol Calculated (mg/dL)   Date Value   08/16/2018 7   07/05/2017 22       Diabetes Management Resources    Amount of exercise or physical activity: 4-5 days/week for an average of 15-30 minutes    Problems taking medications regularly: No    Medication side effects: none    Diet: regular (no restrictions)        PROBLEMS TO ADD ON...  Medication- Coreg not getting enough from the pharmacy   -------------------------------------    Problem list and histories reviewed & adjusted, as indicated.  Additional history: as documented    Patient Active Problem List   Diagnosis     Coronary artery disease involving native coronary artery without angina pectoris     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)     Hyperlipidemia LDL goal <100     Hypertension goal BP (blood pressure) < 140/90     Advanced directives, counseling/discussion     CHF (NYHA class II, ACC/AHA stage C) (H)     CKD (chronic kidney disease) stage 3, GFR 30-59 ml/min (H)     Health Care Home     Automatic implantable cardioverter-defibrillator - [employer] single lead ICD, Not Dependent     Chronic systolic heart failure (H)     Proteinuria     Vitamin D deficiency     OA (osteoarthritis) of knee     Chondromalacia of patella,  unspecified laterality     Pain in joint of left shoulder     Tinnitus     Ptosis of right eyelid     Secondary renal hyperparathyroidism (H)     Cortical cataract of both eyes     Moderate nonproliferative diabetic retinopathy, without macular edema, associated with type 2 diabetes mellitus     Obesity     CHNATEL (obstructive sleep apnea)- severe (AHI 30)     Past Surgical History:   Procedure Laterality Date     C CABG, ARTERY-VEIN, THREE  02/2008     IMPLANT AUTOMATIC IMPLANTABLE CARDIOVERTER DEFIBRILLATOR         Social History   Substance Use Topics     Smoking status: Never Smoker     Smokeless tobacco: Never Used      Comment: Never smoked; non-smoking household     Alcohol use No     Family History   Problem Relation Age of Onset     Diabetes Brother      Diabetes Sister      Diabetes Sister      Macular Degeneration No family hx of      Glaucoma No family hx of      Myocardial Infarction No family hx of      KIDNEY DISEASE No family hx of            Reviewed and updated as needed this visit by clinical staff  Tobacco  Allergies  Meds       Reviewed and updated as needed this visit by Provider         ROS:  Constitutional, endocrine systems are negative, except as otherwise noted.    OBJECTIVE:                                                    /74  Pulse 76  Temp 97  F (36.1  C) (Tympanic)  Resp 18  Wt 199 lb 9.6 oz (90.5 kg)  SpO2 98%  BMI 33.73 kg/m2  Body mass index is 33.73 kg/(m^2).  GENERAL APPEARANCE: alert, no distress and over weight  MS: extremities normal- no gross deformities noted  NEURO: Normal strength and tone  PSYCH: mentation appears normal and affect normal/bright    Diagnostic test results:  Diagnostic Test Results:  Results for orders placed or performed in visit on 10/16/18 (from the past 24 hour(s))   Hemoglobin A1c   Result Value Ref Range    Hemoglobin A1C 7.6 (H) 0 - 5.6 %        ASSESSMENT:                                                      1. Type 2 diabetes  mellitus with diabetic nephropathy, with long-term current use of insulin (H)    2. Hypertension goal BP (blood pressure) < 140/90         PLAN:                                                    A1c has improved and is now at goal. He has not used his short acting insulin. Will continue his Lantus without change. I asked him to have an A1c done in January while he's in [place]. Otherwise, he'll follow up with me when he returns, possibly March 2019.    The patient was in agreement with the plan today and had no questions or concerns prior to leaving the clinic.     Anna Archuleta PA-C  [facility]

## 2018-10-16 NOTE — MR AVS SNAPSHOT
After Visit Summary   10/16/2018    Lucian Oliveira    MRN: [id number]           Patient Information     Date Of Birth          1955        Visit Information        Provider Department      10/16/2018 8:40 AM Anna Archuleta PA-C; [employer] [facility]        Today's Diagnoses     Type 2 diabetes mellitus with diabetic nephropathy, with long-term current use of insulin (H)    -  1    Screen for colon cancer           Follow-ups after your visit        Follow-up notes from your care team     Return in about 3 months (around 1/16/2019) for repeat labs.      Your next 10 appointments already scheduled     Oct 22, 2018  2:00 PM CDT   Lab with [facility]   [facility] ([facility])    [address]  1st Floor  [place] [address] [phone]            Oct 22, 2018  2:30 PM CDT   (Arrive by 2:15 PM)   Implanted Defibulator with Uc Cv Device 1   [facility] ([facility])    [address]  3rd [address]  [address]               Oct 22, 2018  3:00 PM CDT   (Arrive by 2:45 PM)   RETURN HEART FAILURE with Arvin Sheridan MD   [facility] ([facility])    [address]  [address]  [place] [address] [phone]              Future tests that were ordered for you today     Open Future Orders        Priority Expected Expires Ordered    Fecal colorectal cancer screen FIT - Future (S+30) Routine 11/6/2018 11/15/2018 10/16/2018    **A1C FUTURE anytime Routine 1/16/2019 10/16/2019 10/16/2018            Who to contact     Normal or non-critical lab and imaging results will be communicated to you by MyChart, letter or phone within 4 business days after the clinic has received the results. If you do not hear from us within 7 days, please contact the clinic through MyChart or phone. If you have a critical or abnormal lab result, we will notify you by  phone as soon as possible.  Submit refill requests through [employer] or call your pharmacy and they will forward the refill request to us. Please allow 3 business days for your refill to be completed.          If you need to speak with a  for additional information , please call: [phone]             Additional Information About Your Visit        [employer] Information     [employer] gives you secure access to your electronic health record. If you see a primary care provider, you can also send messages to your care team and make appointments. If you have questions, please call your primary care clinic.  If you do not have a primary care provider, please call [phone] and they will assist you.        Care EveryWhere ID     This is your Care EveryWhere ID. This could be used by other organizations to access your [facility] medical records  [id number]        Your Vitals Were     Pulse Temperature Respirations Pulse Oximetry BMI (Body Mass Index)       76 97  F (36.1  C) (Tympanic) 18 98% 33.73 kg/m2        Blood Pressure from Last 3 Encounters:   10/16/18 121/74   10/03/18 112/75   08/16/18 108/68    Weight from Last 3 Encounters:   10/16/18 199 lb 9.6 oz (90.5 kg)   10/03/18 198 lb (89.8 kg)   08/16/18 199 lb (90.3 kg)              We Performed the Following     Hemoglobin A1c          Today's Medication Changes          These changes are accurate as of 10/16/18  9:35 AM.  If you have any questions, ask your nurse or doctor.               Stop taking these medicines if you haven't already. Please contact your care team if you have questions.     NovoLOG FLEXPEN 100 UNIT/ML injection   Generic drug:  insulin aspart   Stopped by:  Anna Archuleta PA-C                    Primary Care Provider Office Phone # Fax #    Anna Archuleta PA-C [phone]       [address] [place] [address]        Goals        General    Medical (pt-stated)     Notes - Note created  8/23/2018   1:07 PM by Suyapa Dias, RN    Goal Statement: Test blood sugar two hours after dinner  Measure of Success: blood sugar data when he returns  Supportive Steps to Achieve: set the meter on the kitchen table  Barriers: does not like needles  Strengths: renewed interest in diabetes  Date to Achieve By: 9/11/18  Patient expressed understanding of goal: yes            Equal Access to Services     MARIBETH RUSH AH: Hadii rajani ku hadasho Soomaali, waaxda luqadaha, qaybta kaalmada burt, joana mcclellanalconlucia graves . So [facility] [phone].    ATENCIÓN: Si habla español, tiene a lainez disposición servicios gratuitos de asistencia lingüística. Llame al [phone].    We comply with applicable federal civil rights laws and [place] laws. We do not discriminate on the basis of race, color, national origin, age, disability, sex, sexual orientation, or gender identity.            Thank you!     Thank you for choosing [facility]  for your care. Our goal is always to provide you with excellent care. Hearing back from our patients is one way we can continue to improve our services. Please take a few minutes to complete the written survey that you may receive in the mail after your visit with us. Thank you!             Your Updated Medication List - Protect others around you: Learn how to safely use, store and throw away your medicines at www.disposemymeds.org.          This list is accurate as of 10/16/18  9:35 AM.  Always use your most recent med list.                   Brand Name Dispense Instructions for use Diagnosis    aspirin 81 MG tablet     30 tablet    Take 1 tablet (81 mg) by mouth daily    Ischemic cardiomyopathy       atorvastatin 40 MG tablet    LIPITOR    90 tablet    Take 1 tablet (40 mg) by mouth daily    Chronic systolic heart failure (H), Wheezing, CKD (chronic kidney disease) stage 3, GFR 30-59 ml/min (H), CHF (NYHA class II, ACC/AHA stage C) (H)       B-D U/F 31G X 5 MM   Generic drug:   insulin pen needle     100 each    USE 1 NEEDLE DAILY AS DIRECTED -DUE FOR APPOINTMENT FOR FURTHER REFILLS    Type 2 diabetes mellitus with diabetic nephropathy, with long-term current use of insulin (H)       blood glucose monitoring meter device kit     1 kit    Use to test blood sugar 3-4 times daily, as directed.    Type 2 diabetes mellitus with diabetic nephropathy, with long-term current use of insulin (H)       blood glucose monitoring test strip    ACCU-CHEK SMARTVIEW    100 each    Use to test blood sugar three times daily or as directed. Please schedule follow up for further details.    Type 2 diabetes mellitus with diabetic nephropathy, with long-term current use of insulin (H)       carvedilol 25 MG tablet    COREG    180 tablet    Take 1 tablet (25 mg) by mouth 2 times daily (with meals)    Hypertension goal BP (blood pressure) < 140/90       cholecalciferol 1000 units capsule    vitamin  -D    30 capsule    Take 1 capsule (1,000 Units) by mouth daily    CKD (chronic kidney disease) stage 3, GFR 30-59 ml/min (H)       clopidogrel 75 MG tablet    PLAVIX    30 tablet    TAKE ONE TABLET BY MOUTH EVERY DAY, DUE FOR APPOINTMENT    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 (H)       continuous blood glucose monitoring sensor     9 each    For use with Freestyle Navjot Flash  for continuous monitioring of blood glucose levels. Replace sensor every 10 days.    Type 2 diabetes mellitus with hyperglycemia, with long-term current use of insulin (H)       furosemide 40 MG tablet    LASIX    100 tablet    Take 1 tablet (40 mg) by mouth daily - Take an extra 40mg as needed if weight goes up 2 pounds overnight, or more than 5 pounds in 1 week.    CHF (NYHA class II, ACC/AHA stage C) (H)       glimepiride 4 MG tablet    AMARYL    180 tablet    Take 1 tablet (4 mg) by mouth every morning (before breakfast)    Type 2 diabetes mellitus with hyperglycemia,  with long-term current use of insulin (H)       insulin glargine 100 UNIT/ML injection    LANTUS SOLOSTAR    45 mL    Take 8 units in the morning and 40 units in the evening    Type 2 diabetes mellitus with diabetic nephropathy, with long-term current use of insulin (H)       isosorbide mononitrate 30 MG 24 hr tablet    IMDUR    135 tablet    TAKE ONE AND ONE-HALF TABLETS BY MOUTH ONCE DAILY    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

## 2018-10-22 ENCOUNTER — ALLIED HEALTH/NURSE VISIT (OUTPATIENT)
Dept: CARDIOLOGY | Facility: CLINIC | Age: 63
End: 2018-10-22
Attending: INTERNAL MEDICINE
Payer: MEDICARE

## 2018-10-22 VITALS
HEIGHT: 64 IN | HEART RATE: 73 BPM | OXYGEN SATURATION: 97 % | BODY MASS INDEX: 33.63 KG/M2 | SYSTOLIC BLOOD PRESSURE: 113 MMHG | WEIGHT: 197 LBS | DIASTOLIC BLOOD PRESSURE: 72 MMHG

## 2018-10-22 DIAGNOSIS — I50.9 CHF (NYHA CLASS II, ACC/AHA STAGE C) (H): Chronic | ICD-10-CM

## 2018-10-22 DIAGNOSIS — I50.22 CHRONIC SYSTOLIC HEART FAILURE (H): ICD-10-CM

## 2018-10-22 DIAGNOSIS — I50.22 CHRONIC SYSTOLIC HEART FAILURE (H): Chronic | ICD-10-CM

## 2018-10-22 DIAGNOSIS — R06.2 WHEEZING: ICD-10-CM

## 2018-10-22 DIAGNOSIS — N18.30 CKD (CHRONIC KIDNEY DISEASE) STAGE 3, GFR 30-59 ML/MIN (H): Chronic | ICD-10-CM

## 2018-10-22 DIAGNOSIS — I50.22 CHRONIC SYSTOLIC HEART FAILURE (H): Primary | ICD-10-CM

## 2018-10-22 DIAGNOSIS — I25.739 CORONARY ARTERY DISEASE INVOLVING NONAUTOLOGOUS BIOLOGICAL CORONARY BYPASS GRAFT WITH ANGINA PECTORIS (H): ICD-10-CM

## 2018-10-22 DIAGNOSIS — I25.5 ISCHEMIC CARDIOMYOPATHY: ICD-10-CM

## 2018-10-22 DIAGNOSIS — I10 HYPERTENSION GOAL BP (BLOOD PRESSURE) < 140/90: Chronic | ICD-10-CM

## 2018-10-22 LAB
ANION GAP SERPL CALCULATED.3IONS-SCNC: 6 MMOL/L (ref 3–14)
BUN SERPL-MCNC: 35 MG/DL (ref 7–30)
CALCIUM SERPL-MCNC: 8.8 MG/DL (ref 8.5–10.1)
CHLORIDE SERPL-SCNC: 102 MMOL/L (ref 94–109)
CO2 SERPL-SCNC: 28 MMOL/L (ref 20–32)
CREAT SERPL-MCNC: 2.26 MG/DL (ref 0.66–1.25)
ERYTHROCYTE [DISTWIDTH] IN BLOOD BY AUTOMATED COUNT: 13.3 % (ref 10–15)
GFR SERPL CREATININE-BSD FRML MDRD: 29 ML/MIN/1.7M2
GLUCOSE SERPL-MCNC: 220 MG/DL (ref 70–99)
HCT VFR BLD AUTO: 40.3 % (ref 40–53)
HGB BLD-MCNC: 13.2 G/DL (ref 13.3–17.7)
MCH RBC QN AUTO: 30.6 PG (ref 26.5–33)
MCHC RBC AUTO-ENTMCNC: 32.8 G/DL (ref 31.5–36.5)
MCV RBC AUTO: 93 FL (ref 78–100)
NT-PROBNP SERPL-MCNC: 1122 PG/ML (ref 0–125)
PLATELET # BLD AUTO: 195 10E9/L (ref 150–450)
POTASSIUM SERPL-SCNC: 5 MMOL/L (ref 3.4–5.3)
RBC # BLD AUTO: 4.32 10E12/L (ref 4.4–5.9)
SODIUM SERPL-SCNC: 135 MMOL/L (ref 133–144)
WBC # BLD AUTO: 8.2 10E9/L (ref 4–11)

## 2018-10-22 PROCEDURE — T1013 SIGN LANG/ORAL INTERPRETER: HCPCS | Mod: U3

## 2018-10-22 PROCEDURE — 85027 COMPLETE CBC AUTOMATED: CPT | Performed by: INTERNAL MEDICINE

## 2018-10-22 PROCEDURE — 80048 BASIC METABOLIC PNL TOTAL CA: CPT | Performed by: INTERNAL MEDICINE

## 2018-10-22 PROCEDURE — 83880 ASSAY OF NATRIURETIC PEPTIDE: CPT | Performed by: INTERNAL MEDICINE

## 2018-10-22 PROCEDURE — 36415 COLL VENOUS BLD VENIPUNCTURE: CPT | Performed by: INTERNAL MEDICINE

## 2018-10-22 PROCEDURE — G0463 HOSPITAL OUTPT CLINIC VISIT: HCPCS | Mod: ZF

## 2018-10-22 PROCEDURE — 99215 OFFICE O/P EST HI 40 MIN: CPT | Mod: GC | Performed by: INTERNAL MEDICINE

## 2018-10-22 RX ORDER — ATORVASTATIN CALCIUM 40 MG/1
40 TABLET, FILM COATED ORAL DAILY
Qty: 90 TABLET | Refills: 1 | Status: SHIPPED | OUTPATIENT
Start: 2018-10-22 | End: 2019-06-04

## 2018-10-22 RX ORDER — LIDOCAINE 40 MG/G
CREAM TOPICAL
Status: CANCELLED | OUTPATIENT
Start: 2018-10-22

## 2018-10-22 RX ORDER — SODIUM CHLORIDE 9 MG/ML
INJECTION, SOLUTION INTRAVENOUS CONTINUOUS
Status: CANCELLED | OUTPATIENT
Start: 2018-10-22

## 2018-10-22 RX ORDER — ISOSORBIDE MONONITRATE 30 MG/1
TABLET, EXTENDED RELEASE ORAL
Qty: 135 TABLET | Refills: 3 | Status: SHIPPED | OUTPATIENT
Start: 2018-10-22 | End: 2019-03-25

## 2018-10-22 RX ORDER — CLOPIDOGREL BISULFATE 75 MG/1
TABLET ORAL
Qty: 90 TABLET | Refills: 1 | Status: SHIPPED | OUTPATIENT
Start: 2018-10-22 | End: 2019-06-18

## 2018-10-22 RX ORDER — LOSARTAN POTASSIUM 50 MG/1
50 TABLET ORAL DAILY
Qty: 90 TABLET | Refills: 3 | Status: SHIPPED | OUTPATIENT
Start: 2018-10-22 | End: 2019-09-06

## 2018-10-22 RX ORDER — FUROSEMIDE 40 MG
40 TABLET ORAL DAILY
Qty: 100 TABLET | Refills: 1 | Status: SHIPPED | OUTPATIENT
Start: 2018-10-22 | End: 2019-03-25

## 2018-10-22 RX ORDER — CARVEDILOL 25 MG/1
25 TABLET ORAL 2 TIMES DAILY WITH MEALS
Qty: 180 TABLET | Refills: 1 | Status: SHIPPED | OUTPATIENT
Start: 2018-10-22 | End: 2019-03-25

## 2018-10-22 ASSESSMENT — PAIN SCALES - GENERAL: PAINLEVEL: NO PAIN (0)

## 2018-10-22 NOTE — MR AVS SNAPSHOT
After Visit Summary   10/22/2018    Lucian Oliveira    MRN: [id number]           Patient Information     Date Of Birth          1955        Visit Information        Provider Department      10/22/2018 2:45 PM Caitlin Duque; Arvin Sheridan MD [facility]        Today's Diagnoses     Chronic systolic heart failure (H)    -  1    Hypertension goal BP (blood pressure) < 140/90        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 (H)        CHF (NYHA class II, ACC/AHA stage C) (H)        CKD (chronic kidney disease) stage 3, GFR 30-59 ml/min (H)        Chronic systolic heart failure        Wheezing        Ischemic cardiomyopathy          Care Instructions    You were seen today in the Cardiovascular Clinic at the [facility].       Cardiology Providers you saw during your visit: Dr. Sheridan         Medication Changes:   1. No medication changes today.      Patient Instructions:  1. Return for testing and follow up in 3 months.    Follow up Appointment Information:  1. Return to clinic in 3 months with cardiopulmonary stress test, right heart cath, labs and Dr. Sheridan.    Right Heart Catheterization  A Right Heart Cath is a test that measures how well your heart is pumping blood to your body and will assess the volume and pressures in your heart.     You are scheduled for a Right Heart Catheterization at the [facility], [address] [place] [address]. You are to check in at the Gold Waiting Area.     When you arrive you will be escorted back to the pre procedure area called 2A. Here they will insert an IV, draw labs, and obtain a short medical history. Please bring an updated list of your current medications.    A physician will come and talk with you about the procedure and obtain consent.    A nurse from the Cardiac Catheterization Lab will then escort you to the  procedure area. You will receive a shot to numb the site where the catheter (tube) will enter your body.  This may be in the neck or groin - but likely your neck.  You will not receive sedation or anesthesia.     After the procedure you will recover for a short period and then be discharged.    Pre procedure instructions:     1.  Do not eat any solid food or milk products for 6 hours prior to the exam. You may drink clear liquids until 2 hours prior to the exam. Clear liquids include the following: water, Jell-O, clear broth, apple juice or any non-carbonated drink that you can see through (no pop!).   2. Do not drink alcohol or smoke 24 hours prior to test.  3. Hold these meds:  Do not take your oral diabetic medication or short acting insulin the day of the procedure.      4. You may take your other morning medications as prescribed with a sip of water. You may hold your diuretic that morning.    CardioPulmonary Stress Test (CPX)  CPX is a maximal (meaning you exercise to exhaustion, not to achieve a heart rate) exercise test where we measure how well your heart/body uses oxygen or energy. It is the gold standard for measuring functional capacity and helps us differentiate limitations due to lungs, heart, or fitness.   A CPX is NOT a typical stress test. You will NOT be asked to hold your Beta Blocker medication.   You will be scheduled for a CardioPulmonary Stress Test at the [facility] ([address], [facility] [address], [phone]).  Follow these instructions:    1. Report to the GOLD waiting room in the [facility].    2. Nothing to eat for 3 hours prior to your test. You may have clear liquids up to the time of your test    3. Please wear loose, two-piece clothing and comfortable, rubber soled shoes for walking     For Patients with Diabetes: Your RN Coordinator will give you instructions on adjusting your diabetic medications for the day of your test                    If you have  "questions please contact your diabetic care team                    Remember to  bring your glucometer & insulin with you to take after your test if needed  Results:    Results for BUCK ALFARO (MRN [id number]) as of 10/22/2018 15:10   Ref. Range 10/22/2018 14:13   Sodium Latest Ref Range: 133 - 144 mmol/L 135   Potassium Latest Ref Range: 3.4 - 5.3 mmol/L 5.0   Chloride Latest Ref Range: 94 - 109 mmol/L 102   Carbon Dioxide Latest Ref Range: 20 - 32 mmol/L 28   Urea Nitrogen Latest Ref Range: 7 - 30 mg/dL 35 (H)   Creatinine Latest Ref Range: 0.66 - 1.25 mg/dL 2.26 (H)   GFR Estimate Latest Ref Range: >60 mL/min/1.7m2 29 (L)   GFR Estimate If Black Latest Ref Range: >60 mL/min/1.7m2 35 (L)   Calcium Latest Ref Range: 8.5 - 10.1 mg/dL 8.8   Anion Gap Latest Ref Range: 3 - 14 mmol/L 6   N-Terminal Pro Bnp Latest Ref Range: 0 - 125 pg/mL 1122 (H)   Glucose Latest Ref Range: 70 - 99 mg/dL 220 (H)   WBC Latest Ref Range: 4.0 - 11.0 10e9/L 8.2   Hemoglobin Latest Ref Range: 13.3 - 17.7 g/dL 13.2 (L)   Hematocrit Latest Ref Range: 40.0 - 53.0 % 40.3   Platelet Count Latest Ref Range: 150 - 450 10e9/L 195   RBC Count Latest Ref Range: 4.4 - 5.9 10e12/L 4.32 (L)   MCV Latest Ref Range: 78 - 100 fl 93   MCH Latest Ref Range: 26.5 - 33.0 pg 30.6   MCHC Latest Ref Range: 31.5 - 36.5 g/dL 32.8   RDW Latest Ref Range: 10.0 - 15.0 % 13.3         [address] [facility] [place] [address] [place] [address]        Thank you for allowing us to be a part of your care here at the [facility]      If you have questions or concerns please contact us at:      Meenu Zuñiga RN BSN   Cardiology Care Coordinator  [facility]   Questions and [phone]   First press #1 for the University and then press #3 for \"Medical Advice\" to reach us Cardiology Nurses.                Follow-ups after your visit        Additional Services     Follow-Up with Advanced Heart Failure Cardiologist    " "             Future tests that were ordered for you today     Open Future Orders        Priority Expected Expires Ordered    N terminal pro BNP outpatient Routine 3/1/2019 10/22/2019 10/22/2018    Outpatient Coronary Angiography Adult Order Routine  1/10/2019 10/22/2018    Follow-Up with Advanced Heart Failure Cardiologist Routine 1/22/2019 10/22/2019 10/22/2018    Card Cardiopulmonary Stress Test - Adult Routine 1/22/2019 10/22/2019 10/22/2018    Follow-Up with Device Clinic-6 months Routine 4/20/2019 8/19/2019 10/22/2018            Who to contact     If you have questions or need follow up information about today's clinic visit or your schedule please contact [facility] directly at [phone].  Normal or non-critical lab and imaging results will be communicated to you by [email or web address], letter or phone within 4 business days after the clinic has received the results. If you do not hear from us within 7 days, please contact the clinic through [employer] or phone. If you have a critical or abnormal lab result, we will notify you by phone as soon as possible.  Submit refill requests through [employer] or call your pharmacy and they will forward the refill request to us. Please allow 3 business days for your refill to be completed.          Additional Information About Your Visit        [employer] Information     [employer] gives you secure access to your electronic health record. If you see a primary care provider, you can also send messages to your care team and make appointments. If you have questions, please call your primary care clinic.  If you do not have a primary care provider, please call [phone] and they will assist you.        Care EveryWhere ID     This is your Care EveryWhere ID. This could be used by other organizations to access your [facility] medical records  [id number]        Your Vitals Were     Pulse Height Pulse Oximetry BMI (Body Mass Index)          73 1.626 m (5' 4\") 97% 33.81 kg/m2         Blood " Pressure from Last 3 Encounters:   10/22/18 113/72   10/16/18 121/74   10/03/18 112/75    Weight from Last 3 Encounters:   10/22/18 89.4 kg (197 lb)   10/16/18 90.5 kg (199 lb 9.6 oz)   10/03/18 89.8 kg (198 lb)                 Today's Medication Changes          These changes are accurate as of 10/22/18  4:46 PM.  If you have any questions, ask your nurse or doctor.               These medicines have changed or have updated prescriptions.        Dose/Directions    clopidogrel 75 MG tablet   Commonly known as:  PLAVIX   This may have changed:  See the new instructions.   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)   Changed by:  Arvin Sheridan MD        TAKE ONE TABLET BY MOUTH EVERY DAY   Quantity:  90 tablet   Refills:  1            Where to get your medicines      These medications were sent to [facility] [id number] [place], [place] [address] [facility] [place] [address], [facility] [address]     Phone:  [phone]     aspirin 81 MG tablet    atorvastatin 40 MG tablet    carvedilol 25 MG tablet    clopidogrel 75 MG tablet    furosemide 40 MG tablet    isosorbide mononitrate 30 MG 24 hr tablet    losartan 50 MG tablet                Primary Care Provider Office Phone # Fax #    Anna Archuleta PA-C [phone]       [address] [facility] [address]        Goals        General    Medical (pt-stated)     Notes - Note created  8/23/2018  1:07 PM by Suyapa Dias, RN    Goal Statement: Test blood sugar two hours after dinner  Measure of Success: blood sugar data when he returns  Supportive Steps to Achieve: set the meter on the kitchen table  Barriers: does not like needles  Strengths: renewed interest in diabetes  Date to Achieve By: 9/11/18  Patient expressed understanding of goal: yes            Equal Access to Services     MARIBETH RUSH AH: zach Villanueva  kishore shruthineida alexanderjoana mcgowan. So [facility] [phone].    ATENCIÓN: Si yasmin azar, tiene a lainez disposición servicios gratuitos de asistencia lingüística. Llame al [phone].    We comply with applicable federal civil rights laws and [place] laws. We do not discriminate on the basis of race, color, national origin, age, disability, sex, sexual orientation, or gender identity.            Thank you!     Thank you for choosing [facility]  for your care. Our goal is always to provide you with excellent care. Hearing back from our patients is one way we can continue to improve our services. Please take a few minutes to complete the written survey that you may receive in the mail after your visit with us. Thank you!             Your Updated Medication List - Protect others around you: Learn how to safely use, store and throw away your medicines at www.disposemymeds.org.          This list is accurate as of 10/22/18  4:46 PM.  Always use your most recent med list.                   Brand Name Dispense Instructions for use Diagnosis    aspirin 81 MG tablet     30 tablet    Take 1 tablet (81 mg) by mouth daily    Ischemic cardiomyopathy       atorvastatin 40 MG tablet    LIPITOR    90 tablet    Take 1 tablet (40 mg) by mouth daily    Chronic systolic heart failure (H), Wheezing, CKD (chronic kidney disease) stage 3, GFR 30-59 ml/min (H), CHF (NYHA class II, ACC/AHA stage C) (H)       B-D U/F 31G X 5 MM   Generic drug:  insulin pen needle     100 each    USE 1 NEEDLE DAILY AS DIRECTED -DUE FOR APPOINTMENT FOR FURTHER REFILLS    Type 2 diabetes mellitus with diabetic nephropathy, with long-term current use of insulin (H)       blood glucose monitoring meter device kit     1 kit    Use to test blood sugar 3-4 times daily, as directed.    Type 2 diabetes mellitus with diabetic nephropathy, with long-term current use of insulin (H)       blood glucose monitoring test  strip    ACCU-CHEK SMARTVIEW    100 each    Use to test blood sugar three times daily or as directed. Please schedule follow up for further details.    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 Replace sensor every 10 days.    Type 2 diabetes mellitus with hyperglycemia, with long-term current use of insulin (H)       furosemide 40 MG tablet    LASIX    100 tablet    Take 1 tablet (40 mg) by mouth daily - Take an extra 40mg as needed if weight goes up 2 pounds overnight, or more than 5 pounds in 1 week.    CHF (NYHA class II, ACC/AHA stage C) (H)       glimepiride 4 MG tablet    AMARYL    180 tablet    Take 1 tablet (4 mg) by mouth every morning (before breakfast)    Type 2 diabetes mellitus with hyperglycemia, with long-term current use of insulin (H)       insulin glargine 100 UNIT/ML injection    LANTUS SOLOSTAR    45 mL    Take 8 units in the morning and 40 units in the evening    Type 2 diabetes mellitus with diabetic nephropathy, with long-term current use of insulin (H)       isosorbide mononitrate 30 MG 24 hr tablet    IMDUR    135 tablet    TAKE ONE AND ONE-HALF TABLETS BY MOUTH ONCE DAILY    Coronary artery disease involving nonautologous biological coronary bypass  graft with angina pectoris (H)       losartan 50 MG tablet    COZAAR    90 tablet    Take 1 tablet (50 mg) by mouth daily    CKD (chronic kidney disease) stage 3, GFR 30-59 ml/min (H), CHF (NYHA class II, ACC/AHA stage C) (H), Chronic systolic heart failure (H), Wheezing       nitroGLYcerin 0.4 MG sublingual tablet    NITROSTAT    10 tablet    Place 1 tablet (0.4 mg) under the tongue every 5 minutes as needed for chest pain If you are still having symptoms after 3 doses (15 minutes) call 911.    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

## 2018-10-22 NOTE — PROGRESS NOTES
Preliminary Device Interrogation Results.  Final physician signed paceart report to be scanned and attached.    Patient seen in clinic for evaluation and iterative programming of his [employer] single lead ICD per MD orders.  Patient has an appointment to see Dr. Sheridan today.  Normal ICD function.  19 NSVT episodes recorded - 4 beats - 8 sec, 130-171 bpm.  No arrhythmias recorded.  Intrinsic rhythm = NSR @ 75 bpm.   = <1%.  Estimated battery longevity to ELOISA = 10.5 years.  Patient reports that he is feeling well and denies complaints.  Plan for patient to send a remote transmission in ~ 4 months and return to clinic in 6-7 months.  Patient will be in [place] from end of November until end of February.  Dr. Sheridan notified of interrogation results.    Single lead ICD

## 2018-10-22 NOTE — LETTER
RE: Lucian Oliveira  [address] [facility] [address]     2018     Anna Archuleta PA-C    [address]  [address]       RE: Lucian Oliveira   MRN: [id number]   : 1955      Dear Ms. Wrightcarolyn:      We had the pleasure of seeing Mr. Lucian Oliveira for followup in our Advanced Heart Failure Clinic.  As you know, he is a 63-year-old gentleman with ischemic cardiomyopathy and severe LV systolic dysfunction who is currently on optimal guideline-directed medical therapy.  He returns today for follow-up.        Mr. Oliveira states that he is doing well. He is exercising regularly with treadmill, exercise bike, and swimming and is able to do 20-30 minutes at a time. He denies lightheadedness, chest pain, or shortness of breath. No syncope or palpitations. No leg swelling. His weight has been stable. No orthopnea or PND. No ICD shocks.       REVIEW OF SYSTEMS:  A detailed 10-point review of systems was obtained as described in the History of Present Illness.  All other systems were reviewed and are negative.      CURRENT MEDICATIONS:   Current Outpatient Prescriptions   Medication Sig     aspirin 81 MG tablet Take 1 tablet (81 mg) by mouth daily     atorvastatin (LIPITOR) 40 MG tablet Take 1 tablet (40 mg) by mouth daily     B-D U/F insulin pen needle USE 1 NEEDLE DAILY AS DIRECTED -DUE FOR APPOINTMENT FOR FURTHER REFILLS     blood glucose monitoring (ACCU-CHEK FELIEP SMARTVIEW) meter device kit Use to test blood sugar 3-4 times daily, as directed.     blood glucose monitoring (ACCU-CHEK SMARTVIEW) test strip Use to test blood sugar three times daily or as directed. Please schedule follow up for further details.     carvedilol (COREG) 25 MG tablet Take 1 tablet (25 mg) by mouth 2 times daily (with meals)     cholecalciferol (VITAMIN  -D) 1000 units capsule Take 1 capsule (1,000 Units) by mouth daily     clopidogrel (PLAVIX) 75 MG tablet TAKE ONE TABLET  "BY MOUTH EVERY DAY     continuous blood glucose monitoring (FREESTYLE JEREMY) sensor For use with Freestyle Jeremy Flash  for continuous monitioring of blood glucose levels. Replace sensor every 10 days.     furosemide (LASIX) 40 MG tablet Take 1 tablet (40 mg) by mouth daily - Take an extra 40mg as needed if weight goes up 2 pounds overnight, or more than 5 pounds in 1 week.     glimepiride (AMARYL) 4 MG tablet Take 1 tablet (4 mg) by mouth every morning (before breakfast)     insulin glargine (LANTUS SOLOSTAR) 100 UNIT/ML pen Take 8 units in the morning and 40 units in the evening     isosorbide mononitrate (IMDUR) 30 MG 24 hr tablet TAKE ONE AND ONE-HALF TABLETS BY MOUTH ONCE DAILY     losartan (COZAAR) 50 MG tablet Take 1 tablet (50 mg) by mouth daily     order for DME Auto CPAP 8-15 cmH20     sitagliptin (JANUVIA) 50 MG tablet Take 1 tablet (50 mg) by mouth daily     nitroglycerin (NITROSTAT) 0.4 MG SL tablet Place 1 tablet (0.4 mg) under the tongue every 5 minutes as needed for chest pain If you are still having symptoms after 3 doses (15 minutes) call 911. (Patient not taking: Reported on 10/22/2018)     No current facility-administered medications for this visit.       PHYSICAL EXAMINATION:    /72 (BP Location: Right arm, Patient Position: Chair, Cuff Size: Adult Regular)  Pulse 73  Ht 1.626 m (5' 4\")  Wt 89.4 kg (197 lb)  SpO2 97%  BMI 33.81 kg/m2  He had no pallor, cyanosis or jaundice.  His neck exam revealed no JVD, thyromegaly or carotid bruit.  Carotids were 1+ bilaterally.  He had trace bilateral lower extremity edema.  His extremities were warm and well perfused.  Cardiac auscultation revealed normal S1 and S2 with no murmur, rub or gallop.  Auscultation of the lungs revealed equal air entry on both sides with no added sounds.  His abdomen was soft with normal bowel sounds, no tenderness, no rigidity, no guarding.      ECHO 6/5/2017:  Moderate left ventricular dilation is " present.  Severe diffuse hypokinesis is present.  The Ejection Fraction is estimated at 15-20%.  The inferior vena cava is normal.  No pericardial effusion is present.     CPX June 2017:  Walked for 8 minutes and 16 seconds.  He achieved anaerobic threshold with an RER of 1.13.  His VO2 was 11.4 mL/kg/minute.  He had no significant increase in blood pressure with exercise.  His VE/VCO2 slope was 36.  When compared to his last cardiopulmonary exercise testing, there was no significant change.  His VO2 is still low at 11.4.     CPX May 2018:  Walked for 8 minutes and 33 seconds.  He achieved anaerobic threshold with an RER of 1.20.  His VO2 was 10.7 mL/kg/minute.  He had no significant increase in blood pressure with exercise.  His VE/VCO2 slope was 37.  When compared to his last cardiopulmonary exercise testing, there was no significant change.  His VO2 is slightly lower at 10.7.      Lab Results   Component Value Date    WBC 8.2 10/22/2018    HGB 13.2 (L) 10/22/2018    HCT 40.3 10/22/2018     10/22/2018     10/22/2018    POTASSIUM 5.0 10/22/2018    CHLORIDE 102 10/22/2018    CO2 28 10/22/2018    BUN 35 (H) 10/22/2018    CR 2.26 (H) 10/22/2018     (H) 10/22/2018    NTBNPI 8936 (H) 06/26/2015    NTBNP 1122 (H) 10/22/2018    AST 19 08/16/2018    ALT 26 08/16/2018    ALKPHOS 110 08/16/2018    BILITOTAL 0.5 08/16/2018    INR 1.01 04/02/2015      IMPRESSION:    Mr. Lucian Oliveira is a 63-year-old with ischemic cardiomyopathy and severe LV systolic dysfunction who is currently on optimal guideline-directed medical therapy.      He is currently functional class II. He remains physically active and exercises regularly. He is able to do most everything he wants to without significant functional limitation.  He appears euvolemic.  He continues to remain stable from a heart failure standpoint despite his VO2 on his cardiopulmonary stress test. His labs are notable for a creatinine of 2.2 which is up from his  previous baseline of 1.6-1.7. He has not other signs of poor end-organ perfusion. He does not appear to be hypovolemic. While it is possible that his rising creatinine is related to his poor cardiac function, he has no other clinical signs or symptoms of poor perfusion. His creatinine may be due to progression of his intrinsic renal disease as noted in his most recent nephrology note. We will bring him back to clinic in 3 months at which time we will repeat a cardiopulmonary stress test and a right heart catheterization.      He is blood type O and is on the heavier side so would have a long wait time on a cardiac transplant list. Furthermore given his worsening renal function he would likely need a heart and kidney transplant and this is generally not done after age 65. Alternatively he could potentially have an LVAD if his cardiac function were to worsen. At present he remains physically active and minimally symptomatic from a cardiac perspective despite his low VO2 on CPX testing and so he is unlikely to have improvement in his quality of life with LVAD or transplant at this time. In view of this, we will continue to monitor him on medical therapy for now with close monitoring. We will continue his current medications for now.     He will follow up with Dr. Sheridan in 3 months with CPX and RHC.    This patient was seen and examined with the attending physician Dr. Arvin Cao MD  Advanced Heart Failure Fellow    I have personally seen and examined the patient, and then discussed with Dr. Cao, and agree with the findings and plan in this note. I have reviewed today's vital signs, medications, labs, and imaging.     Worsenign renal function. Likely due to diabetic nephropathy. No evidence of worsenign heart failure. FC II. Although less likely, to be safe, will repeat RHC to make sure that low output is not contributing to his worsening renal function.     Sincerely,    rAvin Sheridan MD    [facility]  Section of Advanced Heart Failure   Cardiovascular Division  [facility]   [phone]

## 2018-10-22 NOTE — PATIENT INSTRUCTIONS
You were seen today in the Cardiovascular Clinic at the [facility].       Cardiology Providers you saw during your visit: Dr. Sheridan         Medication Changes:   1. No medication changes today.      Patient Instructions:  1. Return for testing and follow up in 3 months.    Follow up Appointment Information:  1. Return to clinic in 3 months with cardiopulmonary stress test, right heart cath, labs and Dr. Sheridan.    Right Heart Catheterization  A Right Heart Cath is a test that measures how well your heart is pumping blood to your body and will assess the volume and pressures in your heart.     You are scheduled for a Right Heart [place]athe[place] [address]. You are to check in at the Gold Waiting Area.     When you arrive you will be escorted back to the pre procedure area called 2A. Here they will insert an IV, draw labs, and obtain a short medical history. Please bring an updated list of your current medications.    A physician will come and talk with you about the procedure and obtain consent.    A nurse from the Cardiac Catheterization Lab will then escort you to the procedure area. You will receive a shot to numb the site where the catheter (tube) will enter your body.  This may be in the neck or groin - but likely your neck.  You will not receive sedation or anesthesia.     After the procedure you will recover for a short period and then be discharged.    Pre procedure instructions:     1.  Do not eat any solid food or milk products for 6 hours prior to the exam. You may drink clear liquids until 2 hours prior to the exam. Clear liquids include the following: water, Jell-O, clear broth, apple juice or any non-carbonated drink that you can see through (no pop!).   2. Do not drink alcohol or smoke 24 hours prior to test.  3. Hold these meds:  Do not take your oral diabetic medication or short acting insulin  the day of the procedure.      4. You may take your other morning medications as prescribed with a sip of water. You may hold your diuretic that morning.    CardioPulmonary Stress Test (CPX)  CPX is a maximal (meaning you exercise to exhaustion, not to achieve a heart rate) exercise test where we measure how well your heart/body uses oxygen or energy. It is the gold standard for measuring functional capacity and helps us differentiate limitations due to lungs, heart, or fitness.   A CPX is NOT a typical stress test. You will NOT be asked to hold your Beta Blocker medication.   You will be scheduled for a CardioPulmonary Stress Test at the [facility] ([address], [facility] [address], [phone]).  Follow these instructions:    1. Report to the GOLD waiting room in the [facility] hospital.    2. Nothing to eat for 3 hours prior to your test. You may have clear liquids up to the time of your test    3. Please wear loose, two-piece clothing and comfortable, rubber soled shoes for walking     For Patients with Diabetes: Your RN Coordinator will give you instructions on adjusting your diabetic medications for the day of your test                    If you have questions please contact your diabetic care team                    Remember to  bring your glucometer & insulin with you to take after your test if needed  Results:    Results for DOMINICJUANBUCK A (MRN [id number]) as of 10/22/2018 15:10   Ref. Range 10/22/2018 14:13   Sodium Latest Ref Range: 133 - 144 mmol/L 135   Potassium Latest Ref Range: 3.4 - 5.3 mmol/L 5.0   Chloride Latest Ref Range: 94 - 109 mmol/L 102   Carbon Dioxide Latest Ref Range: 20 - 32 mmol/L 28   Urea Nitrogen Latest Ref Range: 7 - 30 mg/dL 35 (H)   Creatinine Latest Ref Range: 0.66 - 1.25 mg/dL 2.26 (H)   GFR Estimate Latest Ref Range: >60 mL/min/1.7m2 29 (L)   GFR Estimate If Black Latest Ref Range: >60 mL/min/1.7m2 35 (L)   Calcium Latest Ref Range: 8.5 - 10.1 mg/dL 8.8  "  Anion Gap Latest Ref Range: 3 - 14 mmol/L 6   N-Terminal Pro Bnp Latest Ref Range: 0 - 125 pg/mL 1122 (H)   Glucose Latest Ref Range: 70 - 99 mg/dL 220 (H)   WBC Latest Ref Range: 4.0 - 11.0 10e9/L 8.2   Hemoglobin Latest Ref Range: 13.3 - 17.7 g/dL 13.2 (L)   Hematocrit Latest Ref Range: 40.0 - 53.0 % 40.3   Platelet Count Latest Ref Range: 150 - 450 10e9/L 195   RBC Count Latest Ref Range: 4.4 - 5.9 10e12/L 4.32 (L)   MCV Latest Ref Range: 78 - 100 fl 93   MCH Latest Ref Range: 26.5 - 33.0 pg 30.6   MCHC Latest Ref Range: 31.5 - 36.5 g/dL 32.8   RDW Latest Ref Range: 10.0 - 15.0 % 13.3         [address] [facility] on [address] [place] [address]        Thank you for allowing us to be a part of your care here at the [facility]      If you have questions or concerns please contact us at:      Meenu Zuñiga RN BSN   Cardiology Care Coordinator  [facility]   Questions and [phone]   First press #1 for the University and then press #3 for \"Medical Advice\" to reach us Cardiology Nurses.        "

## 2018-10-22 NOTE — LETTER
10/22/2018      RE: Lucian Oliveira  [address] [facility] [address]       2018     Anna Archuleta PA-C    [address]  [address]       RE: Lucian Oliveira   MRN: [id number]   : 1955      Dear Ms. Kathe:      We had the pleasure of seeing Mr. Lucian Oliveira for followup in our Advanced Heart Failure Clinic.  As you know, he is a 63-year-old gentleman with ischemic cardiomyopathy and severe LV systolic dysfunction who is currently on optimal guideline-directed medical therapy.  He returns today for follow-up.        Mr. Oliveira states that he is doing well. He is exercising regularly with treadmill, exercise bike, and swimming and is able to do 20-30 minutes at a time. He denies lightheadedness, chest pain, or shortness of breath. No syncope or palpitations. No leg swelling. His weight has been stable. No orthopnea or PND. No ICD shocks.       REVIEW OF SYSTEMS:  A detailed 10-point review of systems was obtained as described in the History of Present Illness.  All other systems were reviewed and are negative.      CURRENT MEDICATIONS:   Current Outpatient Prescriptions   Medication Sig     aspirin 81 MG tablet Take 1 tablet (81 mg) by mouth daily     atorvastatin (LIPITOR) 40 MG tablet Take 1 tablet (40 mg) by mouth daily     B-D U/F insulin pen needle USE 1 NEEDLE DAILY AS DIRECTED -DUE FOR APPOINTMENT FOR FURTHER REFILLS     blood glucose monitoring (ACCU-CHEK FELIPE SMARTVIEW) meter device kit Use to test blood sugar 3-4 times daily, as directed.     blood glucose monitoring (ACCU-CHEK SMARTVIEW) test strip Use to test blood sugar three times daily or as directed. Please schedule follow up for further details.     carvedilol (COREG) 25 MG tablet Take 1 tablet (25 mg) by mouth 2 times daily (with meals)     cholecalciferol (VITAMIN  -D) 1000 units capsule Take 1 capsule (1,000 Units) by mouth daily     clopidogrel (PLAVIX) 75 MG tablet  "TAKE ONE TABLET BY MOUTH EVERY DAY     continuous blood glucose monitoring (FREESTYLE JEREMY) sensor For use with Freestyle Jeremy Flash  for continuous monitioring of blood glucose levels. Replace sensor every 10 days.     furosemide (LASIX) 40 MG tablet Take 1 tablet (40 mg) by mouth daily - Take an extra 40mg as needed if weight goes up 2 pounds overnight, or more than 5 pounds in 1 week.     glimepiride (AMARYL) 4 MG tablet Take 1 tablet (4 mg) by mouth every morning (before breakfast)     insulin glargine (LANTUS SOLOSTAR) 100 UNIT/ML pen Take 8 units in the morning and 40 units in the evening     isosorbide mononitrate (IMDUR) 30 MG 24 hr tablet TAKE ONE AND ONE-HALF TABLETS BY MOUTH ONCE DAILY     losartan (COZAAR) 50 MG tablet Take 1 tablet (50 mg) by mouth daily     order for DME Auto CPAP 8-15 cmH20     sitagliptin (JANUVIA) 50 MG tablet Take 1 tablet (50 mg) by mouth daily     nitroglycerin (NITROSTAT) 0.4 MG SL tablet Place 1 tablet (0.4 mg) under the tongue every 5 minutes as needed for chest pain If you are still having symptoms after 3 doses (15 minutes) call 911. (Patient not taking: Reported on 10/22/2018)     No current facility-administered medications for this visit.         PHYSICAL EXAMINATION:    /72 (BP Location: Right arm, Patient Position: Chair, Cuff Size: Adult Regular)  Pulse 73  Ht 1.626 m (5' 4\")  Wt 89.4 kg (197 lb)  SpO2 97%  BMI 33.81 kg/m2  He had no pallor, cyanosis or jaundice.  His neck exam revealed no JVD, thyromegaly or carotid bruit.  Carotids were 1+ bilaterally.  He had trace bilateral lower extremity edema.  His extremities were warm and well perfused.  Cardiac auscultation revealed normal S1 and S2 with no murmur, rub or gallop.  Auscultation of the lungs revealed equal air entry on both sides with no added sounds.  His abdomen was soft with normal bowel sounds, no tenderness, no rigidity, no guarding.      ECHO 6/5/2017:  Moderate left ventricular " dilation is present.  Severe diffuse hypokinesis is present.  The Ejection Fraction is estimated at 15-20%.  The inferior vena cava is normal.  No pericardial effusion is present.     CPX June 2017:  Walked for 8 minutes and 16 seconds.  He achieved anaerobic threshold with an RER of 1.13.  His VO2 was 11.4 mL/kg/minute.  He had no significant increase in blood pressure with exercise.  His VE/VCO2 slope was 36.  When compared to his last cardiopulmonary exercise testing, there was no significant change.  His VO2 is still low at 11.4.     CPX May 2018:  Walked for 8 minutes and 33 seconds.  He achieved anaerobic threshold with an RER of 1.20.  His VO2 was 10.7 mL/kg/minute.  He had no significant increase in blood pressure with exercise.  His VE/VCO2 slope was 37.  When compared to his last cardiopulmonary exercise testing, there was no significant change.  His VO2 is slightly lower at 10.7.        Lab Results   Component Value Date    WBC 8.2 10/22/2018    HGB 13.2 (L) 10/22/2018    HCT 40.3 10/22/2018     10/22/2018     10/22/2018    POTASSIUM 5.0 10/22/2018    CHLORIDE 102 10/22/2018    CO2 28 10/22/2018    BUN 35 (H) 10/22/2018    CR 2.26 (H) 10/22/2018     (H) 10/22/2018    NTBNPI 8936 (H) 06/26/2015    NTBNP 1122 (H) 10/22/2018    AST 19 08/16/2018    ALT 26 08/16/2018    ALKPHOS 110 08/16/2018    BILITOTAL 0.5 08/16/2018    INR 1.01 04/02/2015          IMPRESSION:    Mr. Lucian Oliveira is a 63-year-old with ischemic cardiomyopathy and severe LV systolic dysfunction who is currently on optimal guideline-directed medical therapy.      He is currently functional class II. He remains physically active and exercises regularly. He is able to do most everything he wants to without significant functional limitation.  He appears euvolemic.  He continues to remain stable from a heart failure standpoint despite his VO2 on his cardiopulmonary stress test. His labs are notable for a creatinine of 2.2  which is up from his previous baseline of 1.6-1.7. He has not other signs of poor end-organ perfusion. He does not appear to be hypovolemic. While it is possible that his rising creatinine is related to his poor cardiac function, he has no other clinical signs or symptoms of poor perfusion. His creatinine may be due to progression of his intrinsic renal disease as noted in his most recent nephrology note. We will bring him back to clinic in 3 months at which time we will repeat a cardiopulmonary stress test and a right heart catheterization.      He is blood type O and is on the heavier side so would have a long wait time on a cardiac transplant list. Furthermore given his worsening renal function he would likely need a heart and kidney transplant and this is generally not done after age 65. Alternatively he could potentially have an LVAD if his cardiac function were to worsen. At present he remains physically active and minimally symptomatic from a cardiac perspective despite his low VO2 on CPX testing and so he is unlikely to have improvement in his quality of life with LVAD or transplant at this time. In view of this, we will continue to monitor him on medical therapy for now with close monitoring. We will continue his current medications for now.     He will follow up with Dr. Sheridan in 3 months with CPX and RHC.      This patient was seen and examined with the attending physician Dr. Arvin Cao MD  Advanced Heart Failure Fellow      I have personally seen and examined the patient, and then discussed with Dr. Cao, and agree with the findings and plan in this note. I have reviewed today's vital signs, medications, labs, and imaging.     Worsenign renal function. Likely due to diabetic nephropathy. No evidence of worsenign heart failure. FC II. Although less likely, to be safe, will repeat RHC to make sure that low output is not contributing to his worsening renal function.      Sincerely,    Arvin Sheridan MD   [facility]  Section of Advanced Heart Failure   Cardiovascular Division  [facility]   [phone]      Arvin Sheridan MD

## 2018-10-22 NOTE — NURSING NOTE
Chief Complaint   Patient presents with     Follow Up For     Reason for visit: RTN HF: 63 year old male presents with systolic heart failure for 6 month follow up with labs and device check prior     Vitals were taken and medications were reconciled.     SUZANNE Preston  3:08 PM

## 2018-10-22 NOTE — NURSING NOTE
Diet: Patient instructed regarding a heart failure healthy diet, including discussion of reduced fat and 2000 mg daily sodium restriction, daily weights, medication purpose and compliance, fluid restrictions and resources for patient and family to access for assistance with heart failure management.       Labs: Patient was given results of the laboratory testing obtained today and patient was instructed about when to return for the next laboratory testing.     Med Reconcile: Reviewed and verified all current medications with the patient. The updated medication list was printed and given to the patient.     Return Appointment: Patient given instructions regarding scheduling next clinic visit. March 2019 with RHC and CPX    Patient stated he understood all health information given and agreed to call with further questions or concerns.       Meenu Zuñiga RN

## 2018-10-22 NOTE — MR AVS SNAPSHOT
After Visit Summary   10/22/2018    Lucian Oliveira    MRN: [id number]           Patient Information     Date Of Birth          1955        Visit Information        Provider Department      10/22/2018 2:15 PM Caitlin Duque; 1,  Cv Device [facility]        Today's Diagnoses     Chronic systolic heart failure (H)          Care Instructions    It was a pleasure to see you in clinic today.  Please do not hesitate to call with any questions or concerns.  You are scheduled for a remote transmission on 2/28/18.  We look forward to seeing you in clinic at your next device check in 6 months.    Jeimy Gipson, RN, MS, CCRN  Electrophysiology Nurse Clinician  [facility]    During Business Hours Please Call:  [phone]  After Hours Please Call:  [phone] - select option #4 and ask for job code [phone]                        Follow-ups after your visit        Additional Services     Follow-Up with Device Clinic-6 months                 Future tests that were ordered for you today     Open Future Orders        Priority Expected Expires Ordered    N terminal pro BNP outpatient Routine 3/1/2019 10/22/2019 10/22/2018    Outpatient Coronary Angiography Adult Order Routine  1/10/2019 10/22/2018    Follow-Up with Advanced Heart Failure Cardiologist Routine 1/22/2019 10/22/2019 10/22/2018    Card Cardiopulmonary Stress Test - Adult Routine 1/22/2019 10/22/2019 10/22/2018    Follow-Up with Device Clinic-6 months Routine 4/20/2019 8/19/2019 10/22/2018            Who to contact     Please call your clinic at No information on file. to:    Ask questions about your health    Make or cancel appointments    Discuss your medicines    Learn about your test results    Speak to your doctor            Additional Information About Your Visit        MyChart Information     [employer] gives you secure access to your electronic health record. If you see a primary care provider, you can also send  messages to your care team and make appointments. If you have questions, please call your primary care clinic.  If you do not have a primary care provider, please call [phone] and they will assist you.      [employer] is an electronic gateway that provides easy, online access to your medical records. With [employer], you can request a clinic appointment, read your test results, renew a prescription or communicate with your care team.     To access your existing account, please contact your [facility] or call [phone] for assistance.        Care EveryWhere ID     This is your Care EveryWhere ID. This could be used by other organizations to access your [place] medical records  [id number]         Blood Pressure from Last 3 Encounters:   10/22/18 113/72   10/16/18 121/74   10/03/18 112/75    Weight from Last 3 Encounters:   10/22/18 89.4 kg (197 lb)   10/16/18 90.5 kg (199 lb 9.6 oz)   10/03/18 89.8 kg (198 lb)              We Performed the Following     Follow-Up with Device Clinic-6 months     ICD DEVICE PROGRAMMING EVAL, SINGLE LEAD ICD          Today's Medication Changes          These changes are accurate as of 10/22/18  4:46 PM.  If you have any questions, ask your nurse or doctor.               These medicines have changed or have updated prescriptions.        Dose/Directions    clopidogrel 75 MG tablet   Commonly known as:  PLAVIX   This may have changed:  See the new instructions.   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)   Changed by:  Arvin Sheridan MD        TAKE ONE TABLET BY MOUTH EVERY DAY   Quantity:  90 tablet   Refills:  1            Where to get your medicines      These medications were sent to [id number] - [facility] [address] AT [place] [address]  [facility], [facility] [address]     Phone:  [phone]     aspirin 81 MG tablet     atorvastatin 40 MG tablet    carvedilol 25 MG tablet    clopidogrel 75 MG tablet    furosemide 40 MG tablet    isosorbide mononitrate 30 MG 24 hr tablet    losartan 50 MG tablet                Primary Care Provider Office Phone # Fax #    Anna Archuleta PA-C [phone] [address] [place] [address]        Goals        General    Medical (pt-stated)     Notes - Note created  8/23/2018  1:07 PM by Suyapa Dias RN    Goal Statement: Test blood sugar two hours after dinner  Measure of Success: blood sugar data when he returns  Supportive Steps to Achieve: set the meter on the kitchen table  Barriers: does not like needles  Strengths: renewed interest in diabetes  Date to Achieve By: 9/11/18  Patient expressed understanding of goal: yes            Equal Access to Services     MARIBETH RUSH AH: Jef singer Soadina, waaxda luqadaha, qaybta kaalmada adeegyada, joana graves . So [facility] [phone].    ATENCIÓN: Si habla español, tiene a lainez disposición servicios gratuitos de asistencia lingüística. Llame al [phone].    We comply with applicable federal civil rights laws and [place] laws. We do not discriminate on the basis of race, color, national origin, age, disability, sex, sexual orientation, or gender identity.            Thank you!     Thank you for choosing [facility]  for your care. Our goal is always to provide you with excellent care. Hearing back from our patients is one way we can continue to improve our services. Please take a few minutes to complete the written survey that you may receive in the mail after your visit with us. Thank you!             Your Updated Medication List - Protect others around you: Learn how to safely use, store and throw away your medicines at www.disposemymeds.org.          This list is accurate as of 10/22/18  4:46 PM.  Always use your most recent med list.                   Brand Name Dispense  Instructions for use Diagnosis    aspirin 81 MG tablet     30 tablet    Take 1 tablet (81 mg) by mouth daily    Ischemic cardiomyopathy       atorvastatin 40 MG tablet    LIPITOR    90 tablet    Take 1 tablet (40 mg) by mouth daily    Chronic systolic heart failure (H), Wheezing, CKD (chronic kidney disease) stage 3, GFR 30-59 ml/min (H), CHF (NYHA class II, ACC/AHA stage C) (H)       B-D U/F 31G X 5 MM   Generic drug:  insulin pen needle     100 each    USE 1 NEEDLE DAILY AS DIRECTED -DUE FOR APPOINTMENT FOR FURTHER REFILLS    Type 2 diabetes mellitus with diabetic nephropathy, with long-term current use of insulin (H)       blood glucose monitoring meter device kit     1 kit    Use to test blood sugar 3-4 times daily, as directed.    Type 2 diabetes mellitus with diabetic nephropathy, with long-term current use of insulin (H)       blood glucose monitoring test strip    ACCU-CHEK SMARTVIEW    100 each    Use to test blood sugar three times daily or as directed. Please schedule follow up for further details.    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 Replace sensor every 10 days.    Type 2 diabetes mellitus with hyperglycemia, with long-term current use of insulin (H)        furosemide 40 MG tablet    LASIX    100 tablet    Take 1 tablet (40 mg) by mouth daily - Take an extra 40mg as needed if weight goes up 2 pounds overnight, or more than 5 pounds in 1 week.    CHF (NYHA class II, ACC/AHA stage C) (H)       glimepiride 4 MG tablet    AMARYL    180 tablet    Take 1 tablet (4 mg) by mouth every morning (before breakfast)    Type 2 diabetes mellitus with hyperglycemia, with long-term current use of insulin (H)       insulin glargine 100 UNIT/ML injection    LANTUS SOLOSTAR    45 mL    Take 8 units in the morning and 40 units in the evening    Type 2 diabetes mellitus with diabetic nephropathy, with long-term current use of insulin (H)       isosorbide mononitrate 30 MG 24 hr tablet    IMDUR    135 tablet    TAKE ONE AND ONE-HALF TABLETS BY MOUTH ONCE DAILY    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

## 2018-10-22 NOTE — PATIENT INSTRUCTIONS
It was a pleasure to see you in clinic today.  Please do not hesitate to call with any questions or concerns.  You are scheduled for a remote transmission on 2/28/18.  We look forward to seeing you in clinic at your next device check in 6 months.    Jeimy Gipson, RN, MS, CCRN  Electrophysiology Nurse Clinician  [facility]    During Business Hours Please Call:  [phone]  After Hours Please Call:  [phone] - select option #[phone] and ask for job code [id number]

## 2018-10-22 NOTE — PROGRESS NOTES
2018     Anna Archuleta PA-C    [facility]    [address] [place]  [address]       RE: Lucian Oliveira   MRN: [id number]   : 1955      Dear Ms. Archuleta:      We had the pleasure of seeing Mr. Lucian Oliveira for followup in our Advanced Heart Failure Clinic.  As you know, he is a 63-year-old gentleman with ischemic cardiomyopathy and severe LV systolic dysfunction who is currently on optimal guideline-directed medical therapy.  He returns today for follow-up.        Mr. Oliveira states that he is doing well. He is exercising regularly with treadmill, exercise bike, and swimming and is able to do 20-30 minutes at a time. He denies lightheadedness, chest pain, or shortness of breath. No syncope or palpitations. No leg swelling. His weight has been stable. No orthopnea or PND. No ICD shocks.       REVIEW OF SYSTEMS:  A detailed 10-point review of systems was obtained as described in the History of Present Illness.  All other systems were reviewed and are negative.      CURRENT MEDICATIONS:   Current Outpatient Prescriptions   Medication Sig     aspirin 81 MG tablet Take 1 tablet (81 mg) by mouth daily     atorvastatin (LIPITOR) 40 MG tablet Take 1 tablet (40 mg) by mouth daily     B-D U/F insulin pen needle USE 1 NEEDLE DAILY AS DIRECTED -DUE FOR APPOINTMENT FOR FURTHER REFILLS     blood glucose monitoring (ACCU-CHEK FELIPE SMARTVIEW) meter device kit Use to test blood sugar 3-4 times daily, as directed.     blood glucose monitoring (ACCU-CHEK SMARTVIEW) test strip Use to test blood sugar three times daily or as directed. Please schedule follow up for further details.     carvedilol (COREG) 25 MG tablet Take 1 tablet (25 mg) by mouth 2 times daily (with meals)     cholecalciferol (VITAMIN  -D) 1000 units capsule Take 1 capsule (1,000 Units) by mouth daily     clopidogrel (PLAVIX) 75 MG tablet TAKE ONE TABLET BY MOUTH EVERY DAY     continuous blood glucose monitoring (FREESTYLE  "JEREMY) sensor For use with Freestyle Jeremy Flash  for continuous monitioring of blood glucose levels. Replace sensor every 10 days.     furosemide (LASIX) 40 MG tablet Take 1 tablet (40 mg) by mouth daily - Take an extra 40mg as needed if weight goes up 2 pounds overnight, or more than 5 pounds in 1 week.     glimepiride (AMARYL) 4 MG tablet Take 1 tablet (4 mg) by mouth every morning (before breakfast)     insulin glargine (LANTUS SOLOSTAR) 100 UNIT/ML pen Take 8 units in the morning and 40 units in the evening     isosorbide mononitrate (IMDUR) 30 MG 24 hr tablet TAKE ONE AND ONE-HALF TABLETS BY MOUTH ONCE DAILY     losartan (COZAAR) 50 MG tablet Take 1 tablet (50 mg) by mouth daily     order for DME Auto CPAP 8-15 cmH20     sitagliptin (JANUVIA) 50 MG tablet Take 1 tablet (50 mg) by mouth daily     nitroglycerin (NITROSTAT) 0.4 MG SL tablet Place 1 tablet (0.4 mg) under the tongue every 5 minutes as needed for chest pain If you are still having symptoms after 3 doses (15 minutes) call 911. (Patient not taking: Reported on 10/22/2018)     No current facility-administered medications for this visit.         PHYSICAL EXAMINATION:    /72 (BP Location: Right arm, Patient Position: Chair, Cuff Size: Adult Regular)  Pulse 73  Ht 1.626 m (5' 4\")  Wt 89.4 kg (197 lb)  SpO2 97%  BMI 33.81 kg/m2  He had no pallor, cyanosis or jaundice.  His neck exam revealed no JVD, thyromegaly or carotid bruit.  Carotids were 1+ bilaterally.  He had trace bilateral lower extremity edema.  His extremities were warm and well perfused.  Cardiac auscultation revealed normal S1 and S2 with no murmur, rub or gallop.  Auscultation of the lungs revealed equal air entry on both sides with no added sounds.  His abdomen was soft with normal bowel sounds, no tenderness, no rigidity, no guarding.      ECHO 6/5/2017:  Moderate left ventricular dilation is present.  Severe diffuse hypokinesis is present.  The Ejection Fraction is " estimated at 15-20%.  The inferior vena cava is normal.  No pericardial effusion is present.     CPX June 2017:  Walked for 8 minutes and 16 seconds.  He achieved anaerobic threshold with an RER of 1.13.  His VO2 was 11.4 mL/kg/minute.  He had no significant increase in blood pressure with exercise.  His VE/VCO2 slope was 36.  When compared to his last cardiopulmonary exercise testing, there was no significant change.  His VO2 is still low at 11.4.     CPX May 2018:  Walked for 8 minutes and 33 seconds.  He achieved anaerobic threshold with an RER of 1.20.  His VO2 was 10.7 mL/kg/minute.  He had no significant increase in blood pressure with exercise.  His VE/VCO2 slope was 37.  When compared to his last cardiopulmonary exercise testing, there was no significant change.  His VO2 is slightly lower at 10.7.        Lab Results   Component Value Date    WBC 8.2 10/22/2018    HGB 13.2 (L) 10/22/2018    HCT 40.3 10/22/2018     10/22/2018     10/22/2018    POTASSIUM 5.0 10/22/2018    CHLORIDE 102 10/22/2018    CO2 28 10/22/2018    BUN 35 (H) 10/22/2018    CR 2.26 (H) 10/22/2018     (H) 10/22/2018    NTBNPI 8936 (H) 06/26/2015    NTBNP 1122 (H) 10/22/2018    AST 19 08/16/2018    ALT 26 08/16/2018    ALKPHOS 110 08/16/2018    BILITOTAL 0.5 08/16/2018    INR 1.01 04/02/2015          IMPRESSION:    Mr. Lucian Oliveira is a 63-year-old with ischemic cardiomyopathy and severe LV systolic dysfunction who is currently on optimal guideline-directed medical therapy.      He is currently functional class II. He remains physically active and exercises regularly. He is able to do most everything he wants to without significant functional limitation.  He appears euvolemic.  He continues to remain stable from a heart failure standpoint despite his VO2 on his cardiopulmonary stress test. His labs are notable for a creatinine of 2.2 which is up from his previous baseline of 1.6-1.7. He has not other signs of poor  end-organ perfusion. He does not appear to be hypovolemic. While it is possible that his rising creatinine is related to his poor cardiac function, he has no other clinical signs or symptoms of poor perfusion. His creatinine may be due to progression of his intrinsic renal disease as noted in his most recent nephrology note. We will bring him back to clinic in 3 months at which time we will repeat a cardiopulmonary stress test and a right heart catheterization.      He is blood type O and is on the heavier side so would have a long wait time on a cardiac transplant list. Furthermore given his worsening renal function he would likely need a heart and kidney transplant and this is generally not done after age 65. Alternatively he could potentially have an LVAD if his cardiac function were to worsen. At present he remains physically active and minimally symptomatic from a cardiac perspective despite his low VO2 on CPX testing and so he is unlikely to have improvement in his quality of life with LVAD or transplant at this time. In view of this, we will continue to monitor him on medical therapy for now with close monitoring. We will continue his current medications for now.     He will follow up with Dr. Sheridan in 3 months with CPX and RHC.      This patient was seen and examined with the attending physician Dr. Arvin Cao MD  Advanced Heart Failure Fellow      I have personally seen and examined the patient, and then discussed with Dr. Cao, and agree with the findings and plan in this note. I have reviewed today's vital signs, medications, labs, and imaging.     Worsenign renal function. Likely due to diabetic nephropathy. No evidence of worsenign heart failure. FC II. Although less likely, to be safe, will repeat RHC to make sure that low output is not contributing to his worsening renal function.     Sincerely,    Arvin Sheridan MD   [facility]  Section of Advanced  Heart Failure   Cardiovascular Division  [facility]   [phone]

## 2018-10-23 ENCOUNTER — HOSPITAL ENCOUNTER (OUTPATIENT)
Facility: CLINIC | Age: 63
End: 2018-10-23
Attending: INTERNAL MEDICINE | Admitting: INTERNAL MEDICINE
Payer: MEDICARE

## 2018-10-23 ENCOUNTER — CARE COORDINATION (OUTPATIENT)
Dept: CARDIOLOGY | Facility: CLINIC | Age: 63
End: 2018-10-23

## 2018-10-23 NOTE — PROGRESS NOTES
Per Dr. Sheridan, patient should have testing completed prior to upcoming trip. He would like to reschedule CPX and RHC to be completed in the next 1-2 weeks instead of waiting until March 2019.     Called Lucian with  to discuss. Patient reports he is nervous for upcoming testing. He requests a call back tomorrow to reschedule. I will call Lucian tomorrow to reschedule this testing.

## 2018-10-24 NOTE — PROGRESS NOTES
Called Lucian with an  to discuss rescheduling his CPX and RHC. Lucian is agreeable to rescheduling. His tests have been rescheduled to 11/1 with a check in time of 8am. Letter has been sent to Lucian with this information as well. Lucian reports no additional questions and no questions regarding prep for these tests.

## 2018-11-01 ENCOUNTER — HOSPITAL ENCOUNTER (OUTPATIENT)
Dept: CARDIOLOGY | Facility: CLINIC | Age: 63
End: 2018-11-01
Attending: INTERNAL MEDICINE
Payer: MEDICARE

## 2018-11-01 ENCOUNTER — APPOINTMENT (OUTPATIENT)
Dept: MEDSURG UNIT | Facility: CLINIC | Age: 63
End: 2018-11-01
Attending: INTERNAL MEDICINE
Payer: MEDICARE

## 2018-11-01 ENCOUNTER — HOSPITAL ENCOUNTER (OUTPATIENT)
Facility: CLINIC | Age: 63
Discharge: HOME | End: 2018-11-01
Attending: INTERNAL MEDICINE | Admitting: INTERNAL MEDICINE
Payer: MEDICARE

## 2018-11-01 VITALS
WEIGHT: 197.09 LBS | TEMPERATURE: 98 F | SYSTOLIC BLOOD PRESSURE: 135 MMHG | OXYGEN SATURATION: 98 % | RESPIRATION RATE: 18 BRPM | HEART RATE: 73 BPM | HEIGHT: 64 IN | BODY MASS INDEX: 33.65 KG/M2 | DIASTOLIC BLOOD PRESSURE: 54 MMHG

## 2018-11-01 DIAGNOSIS — I50.22 CHRONIC SYSTOLIC HEART FAILURE (H): ICD-10-CM

## 2018-11-01 LAB
ANION GAP SERPL CALCULATED.3IONS-SCNC: 7 MMOL/L (ref 3–14)
BUN SERPL-MCNC: 45 MG/DL (ref 7–30)
CALCIUM SERPL-MCNC: 8.5 MG/DL (ref 8.5–10.1)
CHLORIDE SERPL-SCNC: 104 MMOL/L (ref 94–109)
CO2 SERPL-SCNC: 26 MMOL/L (ref 20–32)
CREAT SERPL-MCNC: 2.26 MG/DL (ref 0.66–1.25)
ERYTHROCYTE [DISTWIDTH] IN BLOOD BY AUTOMATED COUNT: 13.7 % (ref 10–15)
GFR SERPL CREATININE-BSD FRML MDRD: 29 ML/MIN/1.7M2
GLUCOSE BLDC GLUCOMTR-MCNC: 149 MG/DL (ref 70–99)
GLUCOSE SERPL-MCNC: 147 MG/DL (ref 70–99)
HCT VFR BLD AUTO: 40 % (ref 40–53)
HGB BLD-MCNC: 13.5 G/DL (ref 13.3–17.7)
INR PPP: 0.97 (ref 0.86–1.14)
MCH RBC QN AUTO: 31.5 PG (ref 26.5–33)
MCHC RBC AUTO-ENTMCNC: 33.8 G/DL (ref 31.5–36.5)
MCV RBC AUTO: 93 FL (ref 78–100)
PLATELET # BLD AUTO: 182 10E9/L (ref 150–450)
POTASSIUM SERPL-SCNC: 5 MMOL/L (ref 3.4–5.3)
RBC # BLD AUTO: 4.29 10E12/L (ref 4.4–5.9)
SODIUM SERPL-SCNC: 137 MMOL/L (ref 133–144)
WBC # BLD AUTO: 8.3 10E9/L (ref 4–11)

## 2018-11-01 PROCEDURE — 40000167 ZZH STATISTIC PP CARE STAGE 2

## 2018-11-01 PROCEDURE — 93451 RIGHT HEART CATH: CPT | Mod: 26 | Performed by: INTERNAL MEDICINE

## 2018-11-01 PROCEDURE — 27210787 ZZH MANIFOLD CR2

## 2018-11-01 PROCEDURE — 80048 BASIC METABOLIC PNL TOTAL CA: CPT | Performed by: INTERNAL MEDICINE

## 2018-11-01 PROCEDURE — T1013 SIGN LANG/ORAL INTERPRETER: HCPCS | Mod: U3

## 2018-11-01 PROCEDURE — 93016 CV STRESS TEST SUPVJ ONLY: CPT | Performed by: INTERNAL MEDICINE

## 2018-11-01 PROCEDURE — 85610 PROTHROMBIN TIME: CPT | Performed by: INTERNAL MEDICINE

## 2018-11-01 PROCEDURE — 27211181 ZZH BALLOON TIP PRESSURE CR5

## 2018-11-01 PROCEDURE — 94618 PULMONARY STRESS TESTING: CPT | Mod: 26 | Performed by: INTERNAL MEDICINE

## 2018-11-01 PROCEDURE — 82962 GLUCOSE BLOOD TEST: CPT

## 2018-11-01 PROCEDURE — 94621 CARDIOPULM EXERCISE TESTING: CPT | Performed by: INTERNAL MEDICINE

## 2018-11-01 PROCEDURE — 36415 COLL VENOUS BLD VENIPUNCTURE: CPT | Performed by: INTERNAL MEDICINE

## 2018-11-01 PROCEDURE — 93451 RIGHT HEART CATH: CPT

## 2018-11-01 PROCEDURE — 27210982 ZZH KIT RT HC TOTES DISP CR7

## 2018-11-01 PROCEDURE — 85027 COMPLETE CBC AUTOMATED: CPT | Performed by: INTERNAL MEDICINE

## 2018-11-01 PROCEDURE — 25000125 ZZHC RX 250: Performed by: INTERNAL MEDICINE

## 2018-11-01 RX ORDER — LIDOCAINE 40 MG/G
CREAM TOPICAL
Status: DISCONTINUED | OUTPATIENT
Start: 2018-11-01 | End: 2018-11-01 | Stop reason: HOSPADM

## 2018-11-01 RX ADMIN — LIDOCAINE: 40 CREAM TOPICAL at 10:39

## 2018-11-01 NOTE — IP AVS SNAPSHOT
[address] [address]                                       After Visit Summary   11/1/2018    Lucian Oliveira    MRN: [id number]           After Visit Summary Signature Page     I have received my discharge instructions, and my questions have been answered. I have discussed any challenges I see with this plan with the nurse or doctor.    ..........................................................................................................................................  Patient/Patient Representative Signature      ..........................................................................................................................................  Patient Representative Print Name and Relationship to Patient    ..................................................               ................................................  Date                                   Time    ..........................................................................................................................................  Reviewed by Signature/Title    ...................................................              ..............................................  Date                                               Time          22EPIC Rev 08/18

## 2018-11-01 NOTE — PROCEDURES
FINAL CARDIAC CATH REPORT    PROCEDURES PERFORMED:   Right Heart Catheterization    PHYSICIANS:  Attending Physician: Mario Burr MD  Cardiology Fellow: Enzo Whitfield MD    INDICATION:  63M with iCM (EF 15), DAYA on CKD, single chamber ICD, presents for elective outpatient RHC for hemodynamic assessment.    DESCRIPTION:  1. Consent obtained with discussion of risks.  All questions were answered.  2. Sterile prep and procedure.  3. Location with Sheaths:   Rt IJ  7 Fr 10 cm [short]  4. Access: Local anesthetic with lidocaine.  A standard 18 guage needle with ultrasound guidance was used to establish vascular access using a modified Seldinger technique.  5. Diagnostic Catheters:   7 Fr  [place]  6. Estimated blood loss: < 5 ml    MEDICATIONS:  Heart rate, BP, respiration, oxygen saturation and patient responses were monitored throughout the procedure with the assistance of the RN under my supervision.    Procedures:    HEMODYNAMICS:  BSA 2.01 m2  1. HR 74 bpm  2. /62/87 mmHg  3. RA 8/8/6   4. RV 33/8  5. PA 33/20/23   6. PCW 20/18/15   7. PA sat 63%   8. PCW sat N/A  9. Hgb 13.3 g/dL   10. Jacy CO 3.9   11. Jacy CI 1.9   12. TD CO 4.4   13. TD CI 2.7   14. PVR 1.8  15. SVR 1473     Sheath Removal:  The RIJ sheath was manually removed in the cardiac catheterization laboratory.    Contrast: N/A    Fluoroscopy Time: 0.3 min    COMPLICATIONS:  1. None    SUMMARY:   Mildly elevated right heart filling pressures (RA 6)  Mildly elevated left heart filling pressures (PCW 15)  Normal pulmonary artery pressures (PA mean 23)  Normal cardiac index and output, 4.4 L/min and 2.7 L/min/m2 by TD    PLAN:   - Discharge home  - Outpatient follow up as scheduled    The attending interventional cardiologist was present and supervised all critical aspects the procedure.    Findings discussed with Dr Sheridan.    See CVIS report for final draft.    Enoz Whitfield MD  Cardiology Fellow  [phone]    Staff Cardiologist: I  supervised the cardiology fellow and reviewed the hemodynamic findings with the fellow at the completion of the procedure.  I agree with the documentation above.    Mario Burr MD

## 2018-11-01 NOTE — DISCHARGE INSTRUCTIONS
[facility]                        Interventional Cardiology  Discharge Instructions   Post Right Heart Cath      AFTER YOU GO HOME:    DO drink plenty of fluids    DO resume your regular diet and medications unless otherwise instructed by your Primary Physician    Do Not scrub the procedure site vigorously    No lotion or powder to the puncture site for 3 days    CALL YOUR PRIMARY PHYSICIAN IF: You may resume all normal activity.  Monitor neck site for bleeding, swelling, or voice changes. If you notice bleeding or swelling immediately apply pressure to the site and call number below to speak with Cardiology Fellow.  If you experience any changes in your breathing you should call your doctor immediately or come to the closest Emergency Department.  Do not drive yourself.    ADDITIONAL INSTRUCTIONS: Medications: You are to resume all home medications including anticoagulation therapy unless otherwise advised by your primary cardiologist or nurse coordinator.    Follow Up: Per your primary cardiology team    If you have any questions or concerns regarding your procedure site please call [phone] at anytime and ask for Cardiology Fellow on call.  They are available 24 hours a day.  You may also contact the Cardiology Clinic after hours number at [phone].                                                       Telephone Numbers [phone] Monday-Friday 8:00 am to 4:30 pm    [phone] After 4:30 pm Monday-Friday, Weekends & Holidays  Ask for Interventional Cardiologist on call. Someone is on call 24 hours/day   [facility] toll free number [phone] Monday-Friday 8:00 am to 4:30 pm   [facility] Emergency Dept [phone]

## 2018-11-01 NOTE — IP AVS SNAPSHOT
MRN:[id number]                      After Visit Summary   11/1/2018    Lucian Oliveira    MRN: [id number]           Visit Information        Department      11/1/2018  7:56 AM [address] [facility]          Review of your medicines      UNREVIEWED medicines. Ask your doctor about these medicines        Dose / Directions    aspirin 81 MG tablet   Used for:  Ischemic cardiomyopathy        Dose:  81 mg   Take 1 tablet (81 mg) by mouth daily   Quantity:  30 tablet   Refills:  11       atorvastatin 40 MG tablet   Commonly known as:  LIPITOR   Used for:  Chronic systolic heart failure (H), Wheezing, CKD (chronic kidney disease) stage 3, GFR 30-59 ml/min (H), CHF (NYHA class II, ACC/AHA stage C) (H)        Dose:  40 mg   Take 1 tablet (40 mg) by mouth daily   Quantity:  90 tablet   Refills:  1       carvedilol 25 MG tablet   Commonly known as:  COREG   Used for:  Hypertension goal BP (blood pressure) < 140/90        Dose:  25 mg   Take 1 tablet (25 mg) by mouth 2 times daily (with meals)   Quantity:  180 tablet   Refills:  1       cholecalciferol 1000 units capsule   Commonly known as:  vitamin  -D   Used for:  CKD (chronic kidney disease) stage 3, GFR 30-59 ml/min (H)        Dose:  1 capsule   Take 1 capsule (1,000 Units) by mouth daily   Quantity:  30 capsule   Refills:  0       clopidogrel 75 MG tablet   Commonly known as:  PLAVIX   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   Quantity:  100 tablet   Refills:  1       glimepiride 4 MG tablet   Commonly known as:  AMARYL   Used for:   Type 2 diabetes mellitus with hyperglycemia, with long-term current use of insulin (H)        Dose:  4 mg   Take 1 tablet (4 mg) by mouth every morning (before breakfast)   Quantity:  180 tablet   Refills:  3       insulin glargine 100 UNIT/ML injection   Commonly known as:  LANTUS SOLOSTAR   Used for:  Type 2 diabetes mellitus with diabetic nephropathy, with long-term current use of insulin (H)        Take 8 units in the morning and 40 units in the evening   Quantity:  45 mL   Refills:  3       isosorbide mononitrate 30 MG 24 hr tablet   Commonly known as:  IMDUR   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)        TAKE ONE AND ONE-HALF TABLETS BY MOUTH ONCE DAILY   Quantity:  135 tablet   Refills:  3       losartan 50 MG tablet   Commonly known as:  COZAAR   Used for:  CKD (chronic kidney disease) stage 3, GFR 30-59 ml/min (H), CHF (NYHA class II, ACC/AHA stage C) (H), Chronic systolic heart failure (H), Wheezing        Dose:  50 mg   Take 1 tablet (50 mg) by mouth daily   Quantity:  90 tablet   Refills:  3       nitroGLYcerin 0.4 MG sublingual tablet   Commonly known as:  NITROSTAT   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   Quantity:  10 tablet   Refills:  0       sitagliptin 50 MG tablet   Commonly known as:  JANUVIA   Used for:  Type 2 diabetes mellitus with hyperglycemia, with long-term current use of insulin (H), Type 2 diabetes mellitus with stage 3 chronic kidney disease, with long-term current use of insulin (H)        Dose:  50 mg   Take 1 tablet (50 mg) by mouth daily   Quantity:  90 tablet   Refills:  5         CONTINUE these medicines which have NOT CHANGED        Dose / Directions     B-D U/F 31G X 5 MM   Used for:  Type 2 diabetes mellitus with diabetic nephropathy, with long-term current use of insulin (H)   Generic drug:  insulin pen needle        USE 1 NEEDLE DAILY AS DIRECTED -DUE FOR APPOINTMENT FOR FURTHER REFILLS   Quantity:  100 each   Refills:  0       blood glucose monitoring meter device kit   Used for:  Type 2 diabetes mellitus with diabetic nephropathy, with long-term current use of insulin (H)        Use to test blood sugar 3-4 times daily, as directed.   Quantity:  1 kit   Refills:  0       blood glucose monitoring test strip   Commonly known as:  ACCU-CHEK SMARTVIEW   Used for:  Type 2 diabetes mellitus with diabetic nephropathy, with long-term current use of insulin (H)        Use to test blood sugar three times daily or as directed. Please schedule follow up for further details.   Quantity:  100 each   Refills:  0       continuous blood glucose monitoring sensor   Used for:  Type 2 diabetes mellitus with hyperglycemia, with long-term current use of insulin (H)        For use with Freestyle Navjot Flash  for continuous monitioring of blood glucose levels. Replace sensor every 10 days.   Quantity:  9 each   Refills:  3       order for DME        Auto CPAP 8-15 cmH20   Quantity:  1 Units   Refills:  0                Protect others around you: Learn how to safely use, store and throw away your medicines at www.disposemymeds.org.         Follow-ups after your visit        Your next 10 appointments already scheduled     Nov 01, 2018 10:30 AM CDT   Procedure - 2.5 hour with U2A ROOM 8   [address] [facility] ([facility])    [address] [address]               Nov 01, 2018 11:30 AM CDT   Cath 90 Minute with [id number]   [facility]    [address] [address]   [phone]            Mar 11, 2019  4:00 PM CDT   (Arrive by 3:45  PM)   Implanted Defibulator with Uc Cv Device 1   [facility] ([facility])    [address]  3rd Floor  [address]               Mar 11, 2019  4:30 PM CDT   (Arrive by 4:15 PM)   RETURN HEART FAILURE with Arvin Sheridan MD   [facility] ([facility])    [address]  [address]  [facility] [address] [phone]               Care Instructions        Further instructions from your care team       [facility]                        Interventional Cardiology  Discharge Instructions   Post Right Heart Cath      AFTER YOU GO HOME:    DO drink plenty of fluids    DO resume your regular diet and medications unless otherwise instructed by your Primary Physician    Do Not scrub the procedure site vigorously    No lotion or powder to the puncture site for 3 days    CALL YOUR PRIMARY PHYSICIAN IF: You may resume all normal activity.  Monitor neck site for bleeding, swelling, or voice changes. If you notice bleeding or swelling immediately apply pressure to the site and call number below to speak with Cardiology Fellow.  If you experience any changes in your breathing you should call your doctor immediately or come to the closest Emergency Department.  Do not drive yourself.    ADDITIONAL INSTRUCTIONS: Medications: You are to resume all home medications including anticoagulation therapy unless otherwise advised by your primary cardiologist or nurse coordinator.    Follow Up: Per your primary cardiology team    If you have any questions or concerns regarding your procedure site please call [phone] at anytime and ask for Cardiology Fellow on call.  They are available 24 hours a day.  You may also contact the Cardiology Clinic after hours number at [phone].                                                       Telephone Numbers [phone] Monday-Friday 8:00 am to 4:30 pm    [phone] After  4:30 pm Monday-Friday, Weekends & Holidays  Ask for Interventional Cardiologist on call. Someone is on call 24 hours/day   [facility] toll free number [phone] Monday-Friday 8:00 am to 4:30 pm   [facility] Emergency Dept [phone]                    Additional Information About Your Visit        MyChart Information     [employer] gives you secure access to your electronic health record. If you see a primary care provider, you can also send messages to your care team and make appointments. If you have questions, please call your primary care clinic.  If you do not have a primary care provider, please call [phone] and they will assist you.        Care EveryWhere ID     This is your Care EveryWhere ID. This could be used by other organizations to access your [facility] medical records  [id number]        Your Vitals Were     Blood Pressure Pulse Temperature Respirations Pulse Oximetry       121/60 (BP Location: Left arm) 75 98  F (36.7  C) (Oral) 20 97%        Primary Care Provider Office Phone # Fax #    Anna Archuleta PA-C [phone]      Equal Access to Services     [facility]: Hadii aad ku hadasho Soomaali, waaxda luqadaha, qaybta kaalmada adeyoungyada, joana graves . So [facility] [phone].    ATENCIÓN: Si habla español, tiene a lainez disposición servicios gratuitos de asistencia lingüística. Damon al [phone].    We comply with applicable federal civil rights laws and [place] laws. We do not discriminate on the basis of race, color, national origin, age, disability, sex, sexual orientation, or gender identity.            Thank you!     Thank you for choosing [facility] for your care. Our goal is always to provide you with excellent care. Hearing back from our patients is one way we can continue to improve our services. Please take a few minutes to complete the written survey that you may receive in the mail after you visit with us. Thank you!             Medication  List: This is a list of all your medications and when to take them. Check marks below indicate your daily home schedule. Keep this list as a reference.      Medications           Morning Afternoon Evening Bedtime As Needed    aspirin 81 MG tablet   Take 1 tablet (81 mg) by mouth daily                                atorvastatin 40 MG tablet   Commonly known as:  LIPITOR   Take 1 tablet (40 mg) by mouth daily                                B-D U/F 31G X 5 MM   USE 1 NEEDLE DAILY AS DIRECTED -DUE FOR APPOINTMENT FOR FURTHER REFILLS   Generic drug:  insulin pen needle                                blood glucose monitoring meter device kit   Use to test blood sugar 3-4 times daily, as directed.                                blood glucose monitoring test strip   Commonly known as:  ACCU-CHEK SMARTVIEW   Use to test blood sugar three times daily or as directed. Please schedule follow up for further details.                                carvedilol 25 MG tablet   Commonly known as:  COREG   Take 1 tablet (25 mg) by mouth 2 times daily (with meals)                                cholecalciferol 1000 units capsule   Commonly known as:  vitamin  -D   Take 1 capsule (1,000 Units) by mouth daily                                clopidogrel 75 MG tablet   Commonly known as:  PLAVIX   TAKE ONE TABLET BY MOUTH EVERY DAY                                continuous blood glucose monitoring sensor   For use with Freestyle Navjot Flash  for continuous monitioring of blood glucose levels. Replace sensor every 10 days.                                furosemide 40 MG tablet   Commonly known as:  LASIX   Take 1 tablet (40 mg) by mouth daily - Take an extra 40mg as needed if weight goes up 2 pounds overnight, or more than 5 pounds in 1 week.                                glimepiride 4 MG tablet   Commonly known as:  AMARYL   Take 1 tablet (4 mg) by mouth every morning (before breakfast)                                insulin  glargine 100 UNIT/ML injection   Commonly known as:  LANTUS SOLOSTAR   Take 8 units in the morning and 40 units in the evening                                isosorbide mononitrate 30 MG 24 hr tablet   Commonly known as:  IMDUR   TAKE ONE AND ONE-HALF TABLETS BY MOUTH ONCE DAILY                                losartan 50 MG tablet   Commonly known as:  COZAAR   Take 1 tablet (50 mg) by mouth daily                                nitroGLYcerin 0.4 MG sublingual tablet   Commonly known as:  NITROSTAT   Place 1 tablet (0.4 mg) under the tongue every 5 minutes as needed for chest pain If you are still having symptoms after 3 doses (15 minutes) call 911.                                order for DME   Auto CPAP 8-15 cmH20                                sitagliptin 50 MG tablet   Commonly known as:  JANUVIA   Take 1 tablet (50 mg) by mouth daily

## 2018-11-01 NOTE — PROGRESS NOTES
Pt arrives to 2a, with spouse, for RHC. H&P is up to date. Consent is signed. Discharge instructions reviewed with pt pre procedure, pt verbalizes understanding.  present.

## 2018-11-01 NOTE — PROGRESS NOTES
D: Pt arrived in cath lab for Right Heart Catheterization  I: Right heart catheterization completed per MD.  7fr sheath removed from RIJ site, manual pressure applied until hemostasis achieved.  A: RIJ site clean, dry and intact. Soft, no hematoma.  P: Pt to transfer to  for discharge.  Pt educated on watching neck site for bleeding, hematoma, pressure on airway and voice changes.      Report called.

## 2018-11-01 NOTE — ADDENDUM NOTE
Encounter addended by: Isabel Mccormick on: 11/1/2018  4:04 PM<BR>     Actions taken: Visit Navigator Flowsheet section accepted, Charge Capture section accepted

## 2018-11-01 NOTE — PROGRESS NOTES
Pt has returned to unit 2a s/p RHC. Right neck site CDI, soft, flat, non tender. Pt denies pain.  present.  1305 Pt declined wheelchair and ambulated off unit, with spouse, with steady gait.

## 2018-11-01 NOTE — PROGRESS NOTES
[facility]   Interventional Cardiology        Consenting/Education for Right Heart Catheterization     Patient understands due to their history of HFrEF (EF 15-20%) and recent decline in kidney function we would like to perform right heart catheterization to rule out low output as cause of kidney function decline.  This procedure will be performed by Dr. Burr    Patient understands during the right heart catheterization, a fine tube (catheter) is put into the vein of the groin/neck.  It is carefully passed along until it reaches the heart and then goes up into the blood vessels of the lungs. This is done to measure a variety of pressures in your heart and can tell us how well the heart is filling and emptying, as well as monitor fluid status. This is usually painless. The doctor uses x-ray imaging to see the catheter. Afterwards, the catheter is removed, and incision site covered before leaving the cath lab. This procedure is done with no sedation, usually only requiring Lidocaine.     Patient also understands risks and complications of the procedure which include, but are not limited to bruising/swelling around the incision site, infection, bleeding, allergic reaction to local anesthetic, air embolism, arterial puncture, stroke, heart attack.       Patient verbalized understanding of risks and benefits of the right heart catheterization and has elected to proceed with right heart catheterization.     Consent and education about RHC done in the presence of , Isabel ANAND, and patient's wife.         ELIJAH Menon, CNP  [facility] Interventional Cardiology  [phone]

## 2018-11-02 ENCOUNTER — CARE COORDINATION (OUTPATIENT)
Dept: CARDIOLOGY | Facility: CLINIC | Age: 63
End: 2018-11-02

## 2018-11-02 NOTE — PROGRESS NOTES
Called Lucian with  to discuss testing results. Per Dr. Sheridan, his numbers and testing looks stable. No changes at this time. Patient okay to travel and will plan to see him in March 2019 as previously scheduled. Patient states no additional questions at this time.

## 2018-11-04 NOTE — ADDENDUM NOTE
Encounter addended by: Mario Burr MD on: 11/4/2018  4:49 PM<BR>     Actions taken: Sign clinical note

## 2018-11-05 DIAGNOSIS — G47.33 OSA (OBSTRUCTIVE SLEEP APNEA): Primary | Chronic | ICD-10-CM

## 2018-11-05 RX ORDER — SPIRONOLACTONE 25 MG/1
TABLET ORAL
Qty: 45 TABLET | Refills: 0 | OUTPATIENT
Start: 2018-11-05

## 2018-11-05 NOTE — TELEPHONE ENCOUNTER
SPIRONOLACTONE 25MG TABLETS      Last Written Prescription Date:  09/06/17  Last Fill Quantity: 135,   # refills: 3  Last Office Visit: 10/16/18  Future Office visit:       Routing refill request to provider for review/approval because:  Drug not active on patient's medication list  Medication is reported/historical

## 2018-11-13 ENCOUNTER — DOCUMENTATION ONLY (OUTPATIENT)
Dept: SLEEP MEDICINE | Facility: CLINIC | Age: 63
End: 2018-11-13
Payer: MEDICARE

## 2018-11-13 DIAGNOSIS — G47.33 OSA (OBSTRUCTIVE SLEEP APNEA): Primary | ICD-10-CM

## 2018-11-13 NOTE — PROGRESS NOTES
Patient came to [facility] on November 13, 2018 for a troubleshoot of his CPAP machine.  Patient said the pressures did not seem to be correct and that his machine isn't working properly.  Tested machine with manometer using the felt filter patient had created and had been using, and pressure was off by 1.0 cmH2O.  Added a new Resmed disposable filter and retested with the manometer; machine blew at the correct pressure.  Patient received all new supplies today as well as cleaning instruction and supply replacement schedule.

## 2018-12-21 DIAGNOSIS — Z79.4 TYPE 2 DIABETES MELLITUS WITH DIABETIC NEPHROPATHY, WITH LONG-TERM CURRENT USE OF INSULIN (H): Chronic | ICD-10-CM

## 2018-12-21 DIAGNOSIS — E11.21 TYPE 2 DIABETES MELLITUS WITH DIABETIC NEPHROPATHY, WITH LONG-TERM CURRENT USE OF INSULIN (H): Chronic | ICD-10-CM

## 2018-12-21 NOTE — TELEPHONE ENCOUNTER
"Requested Prescriptions   Pending Prescriptions Disp Refills     blood glucose (ACCU-CHEK SMARTVIEW) test strip [Pharmacy Med Name: ACCU-CHEK SMARTVIEW TESTSTRIPS 100S] 100 strip 0    Last Written Prescription Date:  09/07/18  Last Fill Quantity: 100,  # refills: 0   Last office visit: 10/16/2018 with prescribing provider:  KIMBERLEY Archuleta Future Office Visit:     Sig: USE AS DIRECTED TO TEST BLOOD SUGAR THREE TIMES DAILY    Diabetic Supplies Protocol Passed - 12/21/2018  9:58 AM       Passed - Patient is 18 years of age or older       Passed - Recent (6 mo) or future (30 days) visit within the authorizing provider's specialty    Patient had office visit in the last 6 months or has a visit in the next 30 days with authorizing provider.  See \"Patient Info\" tab in inbasket, or \"Choose Columns\" in Meds & Orders section of the refill encounter.              "

## 2019-01-17 RX ORDER — SPIRONOLACTONE 25 MG/1
TABLET ORAL
Qty: 45 TABLET | Refills: 0 | OUTPATIENT
Start: 2019-01-17

## 2019-01-17 NOTE — TELEPHONE ENCOUNTER
Called pharmacy and yes it was an auto refill.    Advised pharmacy that this medication is discontinued. Pharmacy verbalized understanding and agrees with plan.     Niesha Modi, RN, BSN

## 2019-01-17 NOTE — TELEPHONE ENCOUNTER
Routing refill request to provider for review/approval because:  Looks like med was discontinued on 4-30-18 Cardiologist appt.  Please clarify

## 2019-01-17 NOTE — TELEPHONE ENCOUNTER
"Requested Prescriptions   Pending Prescriptions Disp Refills     spironolactone (ALDACTONE) 25 MG tablet [Pharmacy Med Name: SPIRONOLACTONE 25MG TABLETS] 45 tablet 0    Last Written Prescription Date:  12/21/18  Last Fill Quantity: 45,  # refills: 0   Last office visit: 10/16/2018 with prescribing provider:  KIMBERLEY Archuleta Future Office Visit:     Sig: TAKE 1 1/2 TABLET BY MOUTH EVERY DAY    Diuretics (Including Combos) Protocol Failed - 1/17/2019 10:15 AM       Failed - Medication is active on med list       Failed - Normal serum creatinine on file in past 12 months    Recent Labs   Lab Test 11/01/18  0811   CR 2.26*             Passed - Blood pressure under 140/90 in past 12 months    BP Readings from Last 3 Encounters:   11/01/18 135/54   10/22/18 113/72   10/16/18 121/74                Passed - Recent (12 mo) or future (30 days) visit within the authorizing provider's specialty    Patient had office visit in the last 12 months or has a visit in the next 30 days with authorizing provider or within the authorizing provider's specialty.  See \"Patient Info\" tab in inbasket, or \"Choose Columns\" in Meds & Orders section of the refill encounter.             Passed - Patient is age 18 or older       Passed - Normal serum potassium on file in past 12 months    Recent Labs   Lab Test 11/01/18  0811   POTASSIUM 5.0                   Passed - Normal serum sodium on file in past 12 months    Recent Labs   Lab Test 11/01/18  0811                   "

## 2019-01-26 DIAGNOSIS — E11.21 TYPE 2 DIABETES MELLITUS WITH DIABETIC NEPHROPATHY, WITH LONG-TERM CURRENT USE OF INSULIN (H): Chronic | ICD-10-CM

## 2019-01-26 DIAGNOSIS — Z79.4 TYPE 2 DIABETES MELLITUS WITH DIABETIC NEPHROPATHY, WITH LONG-TERM CURRENT USE OF INSULIN (H): Chronic | ICD-10-CM

## 2019-01-26 NOTE — TELEPHONE ENCOUNTER
"Requested Prescriptions   Pending Prescriptions Disp Refills     blood glucose (ACCU-CHEK SMARTVIEW) test strip [Pharmacy Med Name: ACCU-CHEK SMARTVIEW TESTSTRIPS 100S] 100 strip 0    Last Written Prescription Date:  12/26/18  Last Fill Quantity: 100,  # refills: 0   Last office visit: 10/16/2018 with prescribing provider:  KIMBERLEY Archuleta Future Office Visit:     Sig: TEST 3 TIMES DAILY    Diabetic Supplies Protocol Passed - 1/26/2019  1:08 PM       Passed - Medication is active on med list       Passed - Patient is 18 years of age or older       Passed - Recent (6 mo) or future (30 days) visit within the authorizing provider's specialty    Patient had office visit in the last 6 months or has a visit in the next 30 days with authorizing provider.  See \"Patient Info\" tab in inbasket, or \"Choose Columns\" in Meds & Orders section of the refill encounter.              "

## 2019-01-28 NOTE — TELEPHONE ENCOUNTER
Routing refill request to provider for review/approval because:  Last OV 10/16/18 AVS Instructions:        Return in about 3 months (around 1/16/2019) for repeat labs.       Pt did not complete.  Pended with reminder due for labs.    Niesha Modi, RN, BSN

## 2019-02-08 ENCOUNTER — TELEPHONE (OUTPATIENT)
Dept: ENDOCRINOLOGY | Facility: CLINIC | Age: 64
End: 2019-02-08

## 2019-02-08 NOTE — TELEPHONE ENCOUNTER
Central Prior Authorization Team   Phone: [phone]      PA Initiation    Medication: lantus solostar pen 8 units in the morning, 40 units in the evening -PA initiated  Insurance Company: [employer] - Phone [phone] Fax [phone]  Pharmacy Filling the Rx: [facility] [address] [place] [address] [address] AT [facility] [address]  Filling Pharmacy Phone: [phone]  Filling Pharmacy Fax:    Start Date: 2/8/2019

## 2019-02-08 NOTE — TELEPHONE ENCOUNTER
Prior Authorization Retail Medication Request    Medication/Dose: lantus solostar pen 8 units in the morning, 40 units in the evening   ICD code (if different than what is on RX):  E11.21    Insurance Name:  MEDICARE  Insurance ID:  [id number]       Pharmacy Information (if different than what is on RX)  Name:  [facility]  Phone:  [phone]

## 2019-02-11 NOTE — TELEPHONE ENCOUNTER
Prior Authorization Approval    Authorization Effective Date:    Authorization Expiration Date:    Medication: lantus solostar pen 8 units in the morning, 40 units in the evening -PA approved  Approved Dose/Quantity:   Reference #: [id number]   Insurance Company: [employer] - Phone [phone] Fax [phone]  Expected CoPay:       CoPay Card Available:      Foundation Assistance Needed:    Which Pharmacy is filling the prescription (Not needed for infusion/clinic administered): [facility] [address] [place] [address] [address] AT [facility] OF [address] [facility]  Pharmacy Notified: Yes  Patient Notified:  No-Pharmacy will contact

## 2019-03-05 DIAGNOSIS — I50.22 CHRONIC SYSTOLIC HEART FAILURE (H): Primary | Chronic | ICD-10-CM

## 2019-03-07 DIAGNOSIS — Z79.4 TYPE 2 DIABETES MELLITUS WITH DIABETIC NEPHROPATHY, WITH LONG-TERM CURRENT USE OF INSULIN (H): Chronic | ICD-10-CM

## 2019-03-07 DIAGNOSIS — E11.21 TYPE 2 DIABETES MELLITUS WITH DIABETIC NEPHROPATHY, WITH LONG-TERM CURRENT USE OF INSULIN (H): Chronic | ICD-10-CM

## 2019-03-07 NOTE — TELEPHONE ENCOUNTER
Medication is being filled for 1 time refill only due to:  Patient needs labs .. Patient needs to be seen because ..   Reminder sent.  Grace Ricketts RN

## 2019-03-07 NOTE — TELEPHONE ENCOUNTER
"Requested Prescriptions   Pending Prescriptions Disp Refills     blood glucose (ACCU-CHEK SMARTVIEW) test strip [Pharmacy Med Name: ACCU-CHEK SMARTVIEW TESTSTRIPS 100S] 100 strip 0    Last Written Prescription Date:  1-28-19  Last Fill Quantity: 100,  # refills: 0   Last office visit: 10/16/2018 with prescribing provider:  10-16-18   Future Office Visit:   Sig: USE TO TEST THREE TIMES DAILY AS DIRECTED    Diabetic Supplies Protocol Passed - 3/7/2019 10:19 AM       Passed - Medication is active on med list       Passed - Patient is 18 years of age or older       Passed - Recent (6 mo) or future (30 days) visit within the authorizing provider's specialty    Patient had office visit in the last 6 months or has a visit in the next 30 days with authorizing provider.  See \"Patient Info\" tab in inbasket, or \"Choose Columns\" in Meds & Orders section of the refill encounter.            "

## 2019-03-11 ENCOUNTER — OFFICE VISIT (OUTPATIENT)
Dept: CARDIOLOGY | Facility: CLINIC | Age: 64
End: 2019-03-11
Attending: INTERNAL MEDICINE
Payer: MEDICARE

## 2019-03-11 VITALS
BODY MASS INDEX: 33.26 KG/M2 | OXYGEN SATURATION: 97 % | SYSTOLIC BLOOD PRESSURE: 106 MMHG | DIASTOLIC BLOOD PRESSURE: 70 MMHG | WEIGHT: 199.6 LBS | HEART RATE: 77 BPM | HEIGHT: 65 IN

## 2019-03-11 DIAGNOSIS — I50.22 CHRONIC SYSTOLIC HEART FAILURE (H): Chronic | ICD-10-CM

## 2019-03-11 DIAGNOSIS — I50.22 CHRONIC SYSTOLIC HEART FAILURE (H): ICD-10-CM

## 2019-03-11 DIAGNOSIS — E11.21 TYPE 2 DIABETES MELLITUS WITH DIABETIC NEPHROPATHY, WITH LONG-TERM CURRENT USE OF INSULIN (H): Chronic | ICD-10-CM

## 2019-03-11 DIAGNOSIS — I42.9 CARDIOMYOPATHY (H): ICD-10-CM

## 2019-03-11 DIAGNOSIS — Z79.4 TYPE 2 DIABETES MELLITUS WITH DIABETIC NEPHROPATHY, WITH LONG-TERM CURRENT USE OF INSULIN (H): Chronic | ICD-10-CM

## 2019-03-11 LAB
ANION GAP SERPL CALCULATED.3IONS-SCNC: 8 MMOL/L (ref 3–14)
BUN SERPL-MCNC: 43 MG/DL (ref 7–30)
CALCIUM SERPL-MCNC: 8.4 MG/DL (ref 8.5–10.1)
CHLORIDE SERPL-SCNC: 101 MMOL/L (ref 94–109)
CO2 SERPL-SCNC: 26 MMOL/L (ref 20–32)
CREAT SERPL-MCNC: 2.17 MG/DL (ref 0.66–1.25)
ERYTHROCYTE [DISTWIDTH] IN BLOOD BY AUTOMATED COUNT: 13.5 % (ref 10–15)
GFR SERPL CREATININE-BSD FRML MDRD: 31 ML/MIN/{1.73_M2}
GLUCOSE SERPL-MCNC: 182 MG/DL (ref 70–99)
HBA1C MFR BLD: 8.5 % (ref 0–5.6)
HCT VFR BLD AUTO: 41.9 % (ref 40–53)
HGB BLD-MCNC: 13.6 G/DL (ref 13.3–17.7)
MCH RBC QN AUTO: 30.2 PG (ref 26.5–33)
MCHC RBC AUTO-ENTMCNC: 32.5 G/DL (ref 31.5–36.5)
MCV RBC AUTO: 93 FL (ref 78–100)
NT-PROBNP SERPL-MCNC: 938 PG/ML (ref 0–125)
PLATELET # BLD AUTO: 206 10E9/L (ref 150–450)
POTASSIUM SERPL-SCNC: 4.9 MMOL/L (ref 3.4–5.3)
RBC # BLD AUTO: 4.51 10E12/L (ref 4.4–5.9)
SODIUM SERPL-SCNC: 134 MMOL/L (ref 133–144)
WBC # BLD AUTO: 7.8 10E9/L (ref 4–11)

## 2019-03-11 PROCEDURE — 83880 ASSAY OF NATRIURETIC PEPTIDE: CPT | Performed by: INTERNAL MEDICINE

## 2019-03-11 PROCEDURE — T1013 SIGN LANG/ORAL INTERPRETER: HCPCS | Mod: U3,ZF

## 2019-03-11 PROCEDURE — 36415 COLL VENOUS BLD VENIPUNCTURE: CPT | Performed by: INTERNAL MEDICINE

## 2019-03-11 PROCEDURE — G0463 HOSPITAL OUTPT CLINIC VISIT: HCPCS | Mod: ZF

## 2019-03-11 PROCEDURE — 85027 COMPLETE CBC AUTOMATED: CPT | Performed by: INTERNAL MEDICINE

## 2019-03-11 PROCEDURE — 83036 HEMOGLOBIN GLYCOSYLATED A1C: CPT | Performed by: INTERNAL MEDICINE

## 2019-03-11 PROCEDURE — 80048 BASIC METABOLIC PNL TOTAL CA: CPT | Performed by: INTERNAL MEDICINE

## 2019-03-11 PROCEDURE — 99214 OFFICE O/P EST MOD 30 MIN: CPT | Mod: GC | Performed by: INTERNAL MEDICINE

## 2019-03-11 RX ORDER — LIDOCAINE 40 MG/G
CREAM TOPICAL
Status: CANCELLED | OUTPATIENT
Start: 2019-03-11

## 2019-03-11 ASSESSMENT — MIFFLIN-ST. JEOR: SCORE: 1609.32

## 2019-03-11 ASSESSMENT — PAIN SCALES - GENERAL: PAINLEVEL: NO PAIN (0)

## 2019-03-11 NOTE — PATIENT INSTRUCTIONS
You were seen today in the Cardiovascular Clinic at the [facility].       Cardiology Providers you saw during your visit: Dr. Sheridan      Medication Changes:   1. No medication changes today.      Patient Instructions:  1. It was a pleasure to see you in clinic today.  Please do not hesitate to call with any questions or concerns.  You are scheduled for a remote transmission on 6/11/19.  We look forward to seeing you in clinic at your next device check in 6 months.     Alta Rangel RN, BSN  Electrophysiology Nurse Clinician  [facility]     During Business Hours Please Call:  [phone]  After Hours Please Call:  [phone] - select option #4 and ask for job code [phone]       Follow up Appointment Information:  1. Return to clinic on March 25th for testing and to see Dr. Sheridan. We will have you do a right heart catheterization, cardiopulmonary stress test, echocardiogram and labs.    Right Heart Catheterization  A Right Heart Cath is a test that measures how well your heart is pumping blood to your body and will assess the volume and pressures in your heart.     You are scheduled for a Right Heart Catheterization at the [facility], [address] [place] [address]. You are to check in at the Gold Waiting Area.     When you arrive you will be escorted back to the pre procedure area called 2A. Here they will obtain a short medical history. Please bring an updated list of your current medications.    A physician will come and talk with you about the procedure and obtain consent.    A nurse from the Cardiac Catheterization Lab will then escort you to the procedure area. You will receive a shot to numb the site where the catheter (tube) will enter your body.  This may be in the neck or groin - but likely your neck.  You will not receive sedation or anesthesia.   After the procedure you will recover for a short period and then be  discharged.  Pre procedure instructions:   1.  Do not eat any solid food or milk products for 6 hours prior to the exam. You may drink clear liquids until 2 hours prior to the exam. Clear liquids include the following: water, Jell-O, clear broth, apple juice or any non-carbonated drink that you can see through (no pop!).   2. Do not drink alcohol or smoke 24 hours prior to test.  3. Hold these meds:  Do not take your oral diabetic medication or short acting insulin the day of the procedure.    Hold your warfarin, pradaxa/xarelto/eliquis 2 days prior.   4. You may take your other morning medications as prescribed with a sip of water. You may hold your diuretic that morning.    CardioPulmonary Stress Test (CPX)  CPX is a maximal (meaning you exercise to exhaustion, not to achieve a heart rate) exercise test where we measure how well your heart/body uses oxygen or energy. It is the gold standard for measuring functional capacity and helps us differentiate limitations due to lungs, heart, or fitness.   A CPX is NOT a typical stress test. You will NOT be asked to hold your Beta Blocker medication.   You will be scheduled for a CardioPulmonary Stress Test at the [facility] ([address], [facility] [address], [phone]).     Follow these instructions:    1. Report to the GOLD waiting room in the [facility] hospital.  2. Nothing to eat for 3 hours prior to your test. You may have clear liquids up to the time of your test  3. Please wear loose, two-piece clothing and comfortable, rubber soled shoes for walking   For Patients with Diabetes: Your RN Coordinator will give you instructions on adjusting your diabetic medications for the day of your test                    If you have questions please contact your diabetic care team                    Remember to  bring your glucometer & insulin with you to take after your test if needed      Results:    Results for BUCK ALFARO (MRN [id number]) as of  "3/11/2019 16:51   Ref. Range 3/11/2019 15:50   Sodium Latest Ref Range: 133 - 144 mmol/L 134   Potassium Latest Ref Range: 3.4 - 5.3 mmol/L 4.9   Chloride Latest Ref Range: 94 - 109 mmol/L 101   Carbon Dioxide Latest Ref Range: 20 - 32 mmol/L 26   Urea Nitrogen Latest Ref Range: 7 - 30 mg/dL 43 (H)   Creatinine Latest Ref Range: 0.66 - 1.25 mg/dL 2.17 (H)   GFR Estimate Latest Ref Range: >60 mL/min/1.73_m2 31 (L)   GFR Estimate If Black Latest Ref Range: >60 mL/min/1.73_m2 36 (L)   Calcium Latest Ref Range: 8.5 - 10.1 mg/dL 8.4 (L)   Anion Gap Latest Ref Range: 3 - 14 mmol/L 8   N-Terminal Pro Bnp Latest Ref Range: 0 - 125 pg/mL 938 (H)   Glucose Latest Ref Range: 70 - 99 mg/dL 182 (H)   WBC Latest Ref Range: 4.0 - 11.0 10e9/L 7.8   Hemoglobin Latest Ref Range: 13.3 - 17.7 g/dL 13.6   Hematocrit Latest Ref Range: 40.0 - 53.0 % 41.9   Platelet Count Latest Ref Range: 150 - 450 10e9/L 206   RBC Count Latest Ref Range: 4.4 - 5.9 10e12/L 4.51   MCV Latest Ref Range: 78 - 100 fl 93   MCH Latest Ref Range: 26.5 - 33.0 pg 30.2   MCHC Latest Ref Range: 31.5 - 36.5 g/dL 32.5   RDW Latest Ref Range: 10.0 - 15.0 % 13.5         [address] [facility] on [address]   [place] [id number]        Thank you for allowing us to be a part of your care here at the [facility]      If you have questions or concerns please contact us at:      Meenu Zuñiga RN BSN   Cardiology Care Coordinator  [facility]   Questions and [phone]   First press #1 for the University and then press #3 for \"Medical Advice\" to reach us Cardiology Nurses.        "

## 2019-03-11 NOTE — LETTER
3/11/2019      RE: Lucian Oliveira  [address] [facility] [address]       Dear Colleague,    Thank you for the opportunity to participate in the care of your patient, Lucian Oliveira, at the [facility]. Please see a copy of my visit note below.             Cardiology Consultation      HPI:     We had the pleasure of seeing Mr. Lucian Oliveira for followup in our Advanced Heart Failure Clinic.  As you know, he is a 62-year-old gentleman with ischemic cardiomyopathy and severe LV systolic dysfunction who is currently on optimal guideline-directed medical therapy.  He returns today for follow-up.        Mr. Oliveira states that he is doing well. He denies lightheadedness, chest pain, or shortness of breath. He is able to walk five to six blocks before he needs to stop. He feels like this is more than he was able to do previously. When he stops it is usually due to leg pain, heavy breathing. He walks on a treadmill every other day. He denies PND, orthopnea. Remains consistent with his CPAP. Denies any device shocks        PAST MEDICAL HISTORY:  Past Medical History:   Diagnosis Date     CAD (coronary artery disease)      CHF (congestive heart failure) (H)      CKD (chronic kidney disease), stage III (H)      Cortical cataract of both eyes      Diabetes (H)      Hyperlipidemia      Hypertension      Ischemic cardiomyopathy      Obesity      CHANTEL (obstructive sleep apnea)      Osteoarthritis        CURRENT MEDICATIONS:  Current Outpatient Medications   Medication Sig Dispense Refill     aspirin 81 MG tablet Take 1 tablet (81 mg) by mouth daily 30 tablet 11     atorvastatin (LIPITOR) 40 MG tablet Take 1 tablet (40 mg) by mouth daily 90 tablet 1     B-D U/F insulin pen needle USE 1 NEEDLE DAILY AS DIRECTED -DUE FOR APPOINTMENT FOR FURTHER REFILLS 100 each 0     blood glucose (ACCU-CHEK SMARTVIEW) test strip USE TO TEST THREE TIMES DAILY AS DIRECTED 100 strip  0     blood glucose monitoring (ACCU-CHEK FELIPE SMARTVIEW) meter device kit Use to test blood sugar 3-4 times daily, as directed. 1 kit 0     carvedilol (COREG) 25 MG tablet Take 1 tablet (25 mg) by mouth 2 times daily (with meals) 180 tablet 1     clopidogrel (PLAVIX) 75 MG tablet TAKE ONE TABLET BY MOUTH EVERY DAY 90 tablet 1     continuous blood glucose monitoring (FREESTYLE JEREMY) sensor For use with Freestyle Jeremy Flash  for continuous monitioring of blood glucose levels. Replace sensor every 10 days. 9 each 3     furosemide (LASIX) 40 MG tablet Take 1 tablet (40 mg) by mouth daily - Take an extra 40mg as needed if weight goes up 2 pounds overnight, or more than 5 pounds in 1 week. 100 tablet 1     glimepiride (AMARYL) 4 MG tablet Take 1 tablet (4 mg) by mouth every morning (before breakfast) 180 tablet 3     insulin glargine (LANTUS SOLOSTAR) 100 UNIT/ML pen Take 8 units in the morning and 40 units in the evening 45 mL 3     isosorbide mononitrate (IMDUR) 30 MG 24 hr tablet TAKE ONE AND ONE-HALF TABLETS BY MOUTH ONCE DAILY 135 tablet 3     losartan (COZAAR) 50 MG tablet Take 1 tablet (50 mg) by mouth daily 90 tablet 3     order for DME Auto CPAP 8-15 cmH20 1 Units 0     sitagliptin (JANUVIA) 50 MG tablet Take 1 tablet (50 mg) by mouth daily 90 tablet 5     cholecalciferol (VITAMIN  -D) 1000 units capsule Take 1 capsule (1,000 Units) by mouth daily 30 capsule      nitroglycerin (NITROSTAT) 0.4 MG SL tablet Place 1 tablet (0.4 mg) under the tongue every 5 minutes as needed for chest pain If you are still having symptoms after 3 doses (15 minutes) call 911. (Patient not taking: Reported on 10/22/2018) 10 tablet 0       PAST SURGICAL HISTORY:  Past Surgical History:   Procedure Laterality Date     C CABG, ARTERY-VEIN, THREE  02/2008     IMPLANT AUTOMATIC IMPLANTABLE CARDIOVERTER DEFIBRILLATOR         ALLERGIES     Allergies   Allergen Reactions     No Known Drug Allergies        FAMILY HISTORY:  Family  History   Problem Relation Age of Onset     Diabetes Brother      Diabetes Sister      Diabetes Sister      Macular Degeneration No family hx of      Glaucoma No family hx of      Myocardial Infarction No family hx of      Kidney Disease No family hx of        SOCIAL HISTORY:  Social History     Socioeconomic History     Marital status:      Spouse name: None     Number of children: 4     Years of education: None     Highest education level: None   Occupational History     Occupation:      Employer: [employer]     Employer: RETIRED   Social Needs     Financial resource strain: None     Food insecurity:     Worry: None     Inability: None     Transportation needs:     Medical: None     Non-medical: None   Tobacco Use     Smoking status: Never Smoker     Smokeless tobacco: Never Used     Tobacco comment: Never smoked; non-smoking household   Substance and Sexual Activity     Alcohol use: No     Alcohol/week: 0.0 oz     Drug use: No     Sexual activity: Yes     Partners: Female   Lifestyle     Physical activity:     Days per week: None     Minutes per session: None     Stress: None   Relationships     Social connections:     Talks on phone: None     Gets together: None     Attends Zoroastrianism service: None     Active member of club or organization: None     Attends meetings of clubs or organizations: None     Relationship status: None     Intimate partner violence:     Fear of current or ex partner: None     Emotionally abused: None     Physically abused: None     Forced sexual activity: None   Other Topics Concern     Parent/sibling w/ CABG, MI or angioplasty before 65F 55M? No   Social History Narrative     None       ROS:   Constitutional: No fever, chills, or sweats. No weight gain/loss   ENT: No visual disturbance, ear ache, epistaxis, sore throat  Allergies/Immunologic: Negative.   Respiratory: No cough, hemoptysia  Cardiovascular: As per HPI  GI: No nausea, vomiting, hematemesis, melena,  "or hematochezia  : No urinary frequency, dysuria, or hematuria  Integument: Negative  Psychiatric: Negative  Neuro: Negative  Endocrinology: Negative   Musculoskeletal: Negative    EXAM:  /70 (BP Location: Left arm, Patient Position: Chair, Cuff Size: Adult Large)   Pulse 77   Ht 1.638 m (5' 4.5\")   Wt 90.5 kg (199 lb 9.6 oz)   SpO2 97%   BMI 33.73 kg/m     In general, the patient is a pleasant male in no apparent distress.    HEENT: NC/AT.  PERRLA.  EOMI.  Sclerae white, not injected.  Nares clear.  Pharynx without erythema or exudate.  Dentition intact.    Neck: No adenopathy.  No thyromegaly. Carotids +4/4 bilaterally without bruits.  No jugular venous distension.   Heart: RRR. Normal S1, S2 splits physiologically. No murmur, rub, click, or gallop. The PMI is in the 5th ICS in the midclavicular line. There is no heave.    Lungs: CTA.  No ronchi, wheezes, rales.  No dullness to percussion.   Abdomen: Soft, nontender, nondistended. No organomegaly.  No bruits.   Extremities: No clubbing, cyanosis, or edema.  The pulses are +4/4 at the radial, brachial, femoral, popliteal, DP, and PT sites bilaterally.  No bruits are noted.  Neurologic: Alert and oriented to person/place/time, normal speech, gait and affect  Skin: No petechiae, purpura or rash.    Labs:  LIPID RESULTS:  Lab Results   Component Value Date    CHOL 91 08/16/2018    HDL 25 (L) 08/16/2018    LDL 7 08/16/2018    TRIG 297 (H) 08/16/2018    CHOLHDLRATIO 2.6 06/27/2015    NHDL 66 08/16/2018       LIVER ENZYME RESULTS:  Lab Results   Component Value Date    AST 19 08/16/2018    ALT 26 08/16/2018       CBC RESULTS:  Lab Results   Component Value Date    WBC 7.8 03/11/2019    RBC 4.51 03/11/2019    HGB 13.6 03/11/2019    HCT 41.9 03/11/2019    MCV 93 03/11/2019    MCH 30.2 03/11/2019    MCHC 32.5 03/11/2019    RDW 13.5 03/11/2019     03/11/2019       BMP RESULTS:  Lab Results   Component Value Date     03/11/2019    POTASSIUM 4.9 " 03/11/2019    CHLORIDE 101 03/11/2019    CO2 26 03/11/2019    ANIONGAP 8 03/11/2019     (H) 03/11/2019    BUN 43 (H) 03/11/2019    CR 2.17 (H) 03/11/2019    GFRESTIMATED 31 (L) 03/11/2019    GFRESTBLACK 36 (L) 03/11/2019    BRYANNA 8.4 (L) 03/11/2019        A1C RESULTS:  Lab Results   Component Value Date    A1C 7.6 (H) 10/16/2018       INR RESULTS:  Lab Results   Component Value Date    INR 0.97 11/01/2018    INR 1.01 04/02/2015       Procedures:      Assessment and Plan:     Mr. Lucian Oliveira is a 61-year-old with ischemic cardiomyopathy and severe LV systolic dysfunction who is currently on optimal guideline-directed medical therapy.      He is currently functional class II-III.  He is not significantly volume overloaded.  He has no evidence of end-organ hypoperfusion. He continues to remain stable from a heart failure standpoint despite his VO2 on his cardiopulmonary stress test. He continues to be active. He is blood type O.  He is on the heavier side.  He would have a long wait time on a cardiac transplant list.  Furthermore, it is difficult for someone of his blood type and size to get a heart transplant as a status 2.  He is not inotrope dependent now. Although it is low, his VO2 has been stable for the last 2 years. Finally, his 1-year mortality based on [facility] is ~ 8% ( less than transplant or VAD).  In view of this, we will continue to monitor him on medical therapy for now with close monitoring.     Overall, we are worried about his low cardiac index and low VO2, however he remains asymptomatic. We will repeat RHC, CPX, and TTE in 3/2019 and will have him follow-up in clinic afterwards for decision on starting inotrope and consideration of starting advanced therapies.    Mario Alberto Carr M.D.  Cardiology Fellow, PGY-4    He can return to clinic in six months. Mr. Oliveira was discussed with Dr. Sheridan.    I have personally seen and examined the patient, and then discussed with Dr. Carr, and agree with  the findings and plan in this note. I have reviewed today's vital signs, medications, labs, and imaging.     Arvin Sheridan MD       CC  Patient Care Team:  Anna Archuleta PA-C as PCP - General (Physician Assistant)  Anna Archuleta PA-C as Assigned PCP  Cassandra Mcgovern NP as Nurse Practitioner (Cardiology)  Diane Watts APRN CNP as Nurse Practitioner (Cardiology)  Arvin Sheridan MD as MD (Cardiology)  Yue Dowd MD as MD (INTERNAL MEDICINE - ENDOCRINOLOGY, DIABETES & METABOLISM)  Suyapa Dias, RN as Diabetes Educator (Diabetes Education)  Meenu Zuñiga, [employer] as Nurse Coordinator (Cardiology)  ARVIN SHERIDAN

## 2019-03-11 NOTE — LETTER
3/11/2019      RE: Lucian Oliveira  [address] [facility] [address]                Cardiology Consultation      HPI:     We had the pleasure of seeing Mr. Lucian Oliveira for followup in our Advanced Heart Failure Clinic.  As you know, he is a 62-year-old gentleman with ischemic cardiomyopathy and severe LV systolic dysfunction who is currently on optimal guideline-directed medical therapy.  He returns today for follow-up.        Mr. Oliveira states that he is doing well. He denies lightheadedness, chest pain, or shortness of breath. He is able to walk five to six blocks before he needs to stop. He feels like this is more than he was able to do previously. When he stops it is usually due to leg pain, heavy breathing. He walks on a treadmill every other day. He denies PND, orthopnea. Remains consistent with his CPAP. Denies any device shocks        PAST MEDICAL HISTORY:  Past Medical History:   Diagnosis Date     CAD (coronary artery disease)      CHF (congestive heart failure) (H)      CKD (chronic kidney disease), stage III (H)      Cortical cataract of both eyes      Diabetes (H)      Hyperlipidemia      Hypertension      Ischemic cardiomyopathy      Obesity      CHANTEL (obstructive sleep apnea)      Osteoarthritis        CURRENT MEDICATIONS:  Current Outpatient Medications   Medication Sig Dispense Refill     aspirin 81 MG tablet Take 1 tablet (81 mg) by mouth daily 30 tablet 11     atorvastatin (LIPITOR) 40 MG tablet Take 1 tablet (40 mg) by mouth daily 90 tablet 1     B-D U/F insulin pen needle USE 1 NEEDLE DAILY AS DIRECTED -DUE FOR APPOINTMENT FOR FURTHER REFILLS 100 each 0     blood glucose (ACCU-CHEK SMARTVIEW) test strip USE TO TEST THREE TIMES DAILY AS DIRECTED 100 strip 0     blood glucose monitoring (ACCU-CHEK FELIPE SMARTVIEW) meter device kit Use to test blood sugar 3-4 times daily, as directed. 1 kit 0     carvedilol (COREG) 25 MG tablet Take 1 tablet (25 mg) by mouth 2 times daily (with meals)  180 tablet 1     clopidogrel (PLAVIX) 75 MG tablet TAKE ONE TABLET BY MOUTH EVERY DAY 90 tablet 1     continuous blood glucose monitoring (FREESTYLE JEREMY) sensor For use with Freestyle Jeremy Flash  for continuous monitioring of blood glucose levels. Replace sensor every 10 days. 9 each 3     furosemide (LASIX) 40 MG tablet Take 1 tablet (40 mg) by mouth daily - Take an extra 40mg as needed if weight goes up 2 pounds overnight, or more than 5 pounds in 1 week. 100 tablet 1     glimepiride (AMARYL) 4 MG tablet Take 1 tablet (4 mg) by mouth every morning (before breakfast) 180 tablet 3     insulin glargine (LANTUS SOLOSTAR) 100 UNIT/ML pen Take 8 units in the morning and 40 units in the evening 45 mL 3     isosorbide mononitrate (IMDUR) 30 MG 24 hr tablet TAKE ONE AND ONE-HALF TABLETS BY MOUTH ONCE DAILY 135 tablet 3     losartan (COZAAR) 50 MG tablet Take 1 tablet (50 mg) by mouth daily 90 tablet 3     order for DME Auto CPAP 8-15 cmH20 1 Units 0     sitagliptin (JANUVIA) 50 MG tablet Take 1 tablet (50 mg) by mouth daily 90 tablet 5     cholecalciferol (VITAMIN  -D) 1000 units capsule Take 1 capsule (1,000 Units) by mouth daily 30 capsule      nitroglycerin (NITROSTAT) 0.4 MG SL tablet Place 1 tablet (0.4 mg) under the tongue every 5 minutes as needed for chest pain If you are still having symptoms after 3 doses (15 minutes) call 911. (Patient not taking: Reported on 10/22/2018) 10 tablet 0       PAST SURGICAL HISTORY:  Past Surgical History:   Procedure Laterality Date     C CABG, ARTERY-VEIN, THREE  02/2008     IMPLANT AUTOMATIC IMPLANTABLE CARDIOVERTER DEFIBRILLATOR         ALLERGIES     Allergies   Allergen Reactions     No Known Drug Allergies        FAMILY HISTORY:  Family History   Problem Relation Age of Onset     Diabetes Brother      Diabetes Sister      Diabetes Sister      Macular Degeneration No family hx of      Glaucoma No family hx of      Myocardial Infarction No family hx of      Kidney  Disease No family hx of        SOCIAL HISTORY:  Social History     Socioeconomic History     Marital status:      Spouse name: None     Number of children: 4     Years of education: None     Highest education level: None   Occupational History     Occupation:      Employer: [employer]     Employer: RETIRED   Social Needs     Financial resource strain: None     Food insecurity:     Worry: None     Inability: None     Transportation needs:     Medical: None     Non-medical: None   Tobacco Use     Smoking status: Never Smoker     Smokeless tobacco: Never Used     Tobacco comment: Never smoked; non-smoking household   Substance and Sexual Activity     Alcohol use: No     Alcohol/week: 0.0 oz     Drug use: No     Sexual activity: Yes     Partners: Female   Lifestyle     Physical activity:     Days per week: None     Minutes per session: None     Stress: None   Relationships     Social connections:     Talks on phone: None     Gets together: None     Attends Congregation service: None     Active member of club or organization: None     Attends meetings of clubs or organizations: None     Relationship status: None     Intimate partner violence:     Fear of current or ex partner: None     Emotionally abused: None     Physically abused: None     Forced sexual activity: None   Other Topics Concern     Parent/sibling w/ CABG, MI or angioplasty before 65F 55M? No   Social History Narrative     None       ROS:   Constitutional: No fever, chills, or sweats. No weight gain/loss   ENT: No visual disturbance, ear ache, epistaxis, sore throat  Allergies/Immunologic: Negative.   Respiratory: No cough, hemoptysia  Cardiovascular: As per HPI  GI: No nausea, vomiting, hematemesis, melena, or hematochezia  : No urinary frequency, dysuria, or hematuria  Integument: Negative  Psychiatric: Negative  Neuro: Negative  Endocrinology: Negative   Musculoskeletal: Negative    EXAM:  /70 (BP Location: Left arm, Patient  "Position: Chair, Cuff Size: Adult Large)   Pulse 77   Ht 1.638 m (5' 4.5\")   Wt 90.5 kg (199 lb 9.6 oz)   SpO2 97%   BMI 33.73 kg/m     In general, the patient is a pleasant male in no apparent distress.    HEENT: NC/AT.  PERRLA.  EOMI.  Sclerae white, not injected.  Nares clear.  Pharynx without erythema or exudate.  Dentition intact.    Neck: No adenopathy.  No thyromegaly. Carotids +4/4 bilaterally without bruits.  No jugular venous distension.   Heart: RRR. Normal S1, S2 splits physiologically. No murmur, rub, click, or gallop. The PMI is in the 5th ICS in the midclavicular line. There is no heave.    Lungs: CTA.  No ronchi, wheezes, rales.  No dullness to percussion.   Abdomen: Soft, nontender, nondistended. No organomegaly.  No bruits.   Extremities: No clubbing, cyanosis, or edema.  The pulses are +4/4 at the radial, brachial, femoral, popliteal, DP, and PT sites bilaterally.  No bruits are noted.  Neurologic: Alert and oriented to person/place/time, normal speech, gait and affect  Skin: No petechiae, purpura or rash.    Labs:  LIPID RESULTS:  Lab Results   Component Value Date    CHOL 91 08/16/2018    HDL 25 (L) 08/16/2018    LDL 7 08/16/2018    TRIG 297 (H) 08/16/2018    CHOLHDLRATIO 2.6 06/27/2015    NHDL 66 08/16/2018       LIVER ENZYME RESULTS:  Lab Results   Component Value Date    AST 19 08/16/2018    ALT 26 08/16/2018       CBC RESULTS:  Lab Results   Component Value Date    WBC 7.8 03/11/2019    RBC 4.51 03/11/2019    HGB 13.6 03/11/2019    HCT 41.9 03/11/2019    MCV 93 03/11/2019    MCH 30.2 03/11/2019    MCHC 32.5 03/11/2019    RDW 13.5 03/11/2019     03/11/2019       BMP RESULTS:  Lab Results   Component Value Date     03/11/2019    POTASSIUM 4.9 03/11/2019    CHLORIDE 101 03/11/2019    CO2 26 03/11/2019    ANIONGAP 8 03/11/2019     (H) 03/11/2019    BUN 43 (H) 03/11/2019    CR 2.17 (H) 03/11/2019    GFRESTIMATED 31 (L) 03/11/2019    GFRESTBLACK 36 (L) 03/11/2019    BRYANNA " 8.4 (L) 03/11/2019        A1C RESULTS:  Lab Results   Component Value Date    A1C 7.6 (H) 10/16/2018       INR RESULTS:  Lab Results   Component Value Date    INR 0.97 11/01/2018    INR 1.01 04/02/2015       Procedures:      Assessment and Plan:     Mr. Lucian Oliveira is a 61-year-old with ischemic cardiomyopathy and severe LV systolic dysfunction who is currently on optimal guideline-directed medical therapy.      He is currently functional class II-III.  He is not significantly volume overloaded.  He has no evidence of end-organ hypoperfusion. He continues to remain stable from a heart failure standpoint despite his VO2 on his cardiopulmonary stress test. He continues to be active. He is blood type O.  He is on the heavier side.  He would have a long wait time on a cardiac transplant list.  Furthermore, it is difficult for someone of his blood type and size to get a heart transplant as a status 2.  He is not inotrope dependent now. Although it is low, his VO2 has been stable for the last 2 years. Finally, his 1-year mortality based on SHFM is ~ 8% ( less than transplant or VAD).  In view of this, we will continue to monitor him on medical therapy for now with close monitoring.     Overall, we are worried about his low cardiac index and low VO2, however he remains asymptomatic. We will repeat RHC, CPX, and TTE in 3/2019 and will have him follow-up in clinic afterwards for decision on starting inotrope and consideration of starting advanced therapies.    Mario Alberto Carr M.D.  Cardiology Fellow, PGY-4    He can return to clinic in six months. Mr. Oliveira was discussed with Dr. Sheridan.    I have personally seen and examined the patient, and then discussed with Dr. Carr, and agree with the findings and plan in this note. I have reviewed today's vital signs, medications, labs, and imaging.     Sincerely,    Arvin Sheridan MD   [facility]  Section of Advanced Heart Failure   Cardiovascular  Division  [facility]   [phone]            Patient Care Team:  Anna Archuleta PA-C as PCP - General (Physician Assistant)  Anna Archuleta PA-C as Assigned PCP  Cassandra Mcgovern NP as Nurse Practitioner (Cardiology)  Diane Watts APRN CNP as Nurse Practitioner (Cardiology)  Arvin Sheridan MD as MD (Cardiology)  Yue Dowd MD as MD (INTERNAL MEDICINE - ENDOCRINOLOGY, DIABETES & METABOLISM)  Suyapa Dias, RN as Diabetes Educator (Diabetes Education)  Meenu Zuñiga, AURORA as Nurse Coordinator (Cardiology)  ARVIN SHERIDAN MD

## 2019-03-11 NOTE — NURSING NOTE
Diet: Patient instructed regarding a heart failure healthy diet, including discussion of reduced fat and 2000 mg daily sodium restriction, daily weights, medication purpose and compliance, fluid restrictions and resources for patient and family to access for assistance with heart failure management.       Labs: Patient was given results of the laboratory testing obtained today and patient was instructed about when to return for the next laboratory testing.     Med Reconcile: Reviewed and verified all current medications with the patient. The updated medication list was printed and given to the patient. No medication changes.    Return Appointment: Patient given instructions regarding scheduling next clinic visit. RTC to see Dr. Sheridan in 2 weeks.    Patient stated he understood all health information given and agreed to call with further questions or concerns.       Meenu Zuñiga RN

## 2019-03-11 NOTE — PATIENT INSTRUCTIONS
It was a pleasure to see you in clinic today.  Please do not hesitate to call with any questions or concerns.  You are scheduled for a remote transmission on 6/11/19.  We look forward to seeing you in clinic at your next device check in 6 months.    Alta Rangel RN, BSN  Electrophysiology Nurse Clinician  [facility]    During Business Hours Please Call:  [phone]  After Hours Please Call:  [phone] - select option #4 and ask for job code [phone]

## 2019-03-11 NOTE — NURSING NOTE
Chief Complaint   Patient presents with     Follow Up      Reason for visit: RTN HF: 63 year old male presents with systolic heart failure for 6 month follow up with labs and device check prior     Vitals were taken and medications were reconciled.     SUZANNE Preston  4:29 PM

## 2019-03-11 NOTE — PROGRESS NOTES
Cardiology Consultation      HPI:     We had the pleasure of seeing Mr. Lucian Oliveira for followup in our Advanced Heart Failure Clinic.  As you know, he is a 62-year-old gentleman with ischemic cardiomyopathy and severe LV systolic dysfunction who is currently on optimal guideline-directed medical therapy.  He returns today for follow-up.        Mr. Oliveira states that he is doing well. He denies lightheadedness, chest pain, or shortness of breath. He is able to walk five to six blocks before he needs to stop. He feels like this is more than he was able to do previously. When he stops it is usually due to leg pain, heavy breathing. He walks on a treadmill every other day. He denies PND, orthopnea. Remains consistent with his CPAP. Denies any device shocks        PAST MEDICAL HISTORY:  Past Medical History:   Diagnosis Date     CAD (coronary artery disease)      CHF (congestive heart failure) (H)      CKD (chronic kidney disease), stage III (H)      Cortical cataract of both eyes      Diabetes (H)      Hyperlipidemia      Hypertension      Ischemic cardiomyopathy      Obesity      CHANTEL (obstructive sleep apnea)      Osteoarthritis        CURRENT MEDICATIONS:  Current Outpatient Medications   Medication Sig Dispense Refill     aspirin 81 MG tablet Take 1 tablet (81 mg) by mouth daily 30 tablet 11     atorvastatin (LIPITOR) 40 MG tablet Take 1 tablet (40 mg) by mouth daily 90 tablet 1     B-D U/F insulin pen needle USE 1 NEEDLE DAILY AS DIRECTED -DUE FOR APPOINTMENT FOR FURTHER REFILLS 100 each 0     blood glucose (ACCU-CHEK SMARTVIEW) test strip USE TO TEST THREE TIMES DAILY AS DIRECTED 100 strip 0     blood glucose monitoring (ACCU-CHEK FELIPE SMARTVIEW) meter device kit Use to test blood sugar 3-4 times daily, as directed. 1 kit 0     carvedilol (COREG) 25 MG tablet Take 1 tablet (25 mg) by mouth 2 times daily (with meals) 180 tablet 1     clopidogrel (PLAVIX) 75 MG tablet TAKE ONE TABLET BY MOUTH EVERY DAY 90  tablet 1     continuous blood glucose monitoring (FREESTYLE JEREMY) sensor For use with Freestyle Jeremy Flash  for continuous monitioring of blood glucose levels. Replace sensor every 10 days. 9 each 3     furosemide (LASIX) 40 MG tablet Take 1 tablet (40 mg) by mouth daily - Take an extra 40mg as needed if weight goes up 2 pounds overnight, or more than 5 pounds in 1 week. 100 tablet 1     glimepiride (AMARYL) 4 MG tablet Take 1 tablet (4 mg) by mouth every morning (before breakfast) 180 tablet 3     insulin glargine (LANTUS SOLOSTAR) 100 UNIT/ML pen Take 8 units in the morning and 40 units in the evening 45 mL 3     isosorbide mononitrate (IMDUR) 30 MG 24 hr tablet TAKE ONE AND ONE-HALF TABLETS BY MOUTH ONCE DAILY 135 tablet 3     losartan (COZAAR) 50 MG tablet Take 1 tablet (50 mg) by mouth daily 90 tablet 3     order for DME Auto CPAP 8-15 cmH20 1 Units 0     sitagliptin (JANUVIA) 50 MG tablet Take 1 tablet (50 mg) by mouth daily 90 tablet 5     cholecalciferol (VITAMIN  -D) 1000 units capsule Take 1 capsule (1,000 Units) by mouth daily 30 capsule      nitroglycerin (NITROSTAT) 0.4 MG SL tablet Place 1 tablet (0.4 mg) under the tongue every 5 minutes as needed for chest pain If you are still having symptoms after 3 doses (15 minutes) call 911. (Patient not taking: Reported on 10/22/2018) 10 tablet 0       PAST SURGICAL HISTORY:  Past Surgical History:   Procedure Laterality Date     C CABG, ARTERY-VEIN, THREE  02/2008     IMPLANT AUTOMATIC IMPLANTABLE CARDIOVERTER DEFIBRILLATOR         ALLERGIES     Allergies   Allergen Reactions     No Known Drug Allergies        FAMILY HISTORY:  Family History   Problem Relation Age of Onset     Diabetes Brother      Diabetes Sister      Diabetes Sister      Macular Degeneration No family hx of      Glaucoma No family hx of      Myocardial Infarction No family hx of      Kidney Disease No family hx of        SOCIAL HISTORY:  Social History     Socioeconomic History      "Marital status:      Spouse name: None     Number of children: 4     Years of education: None     Highest education level: None   Occupational History     Occupation:      Employer: [employer]     Employer: RETIRED   Social Needs     Financial resource strain: None     Food insecurity:     Worry: None     Inability: None     Transportation needs:     Medical: None     Non-medical: None   Tobacco Use     Smoking status: Never Smoker     Smokeless tobacco: Never Used     Tobacco comment: Never smoked; non-smoking household   Substance and Sexual Activity     Alcohol use: No     Alcohol/week: 0.0 oz     Drug use: No     Sexual activity: Yes     Partners: Female   Lifestyle     Physical activity:     Days per week: None     Minutes per session: None     Stress: None   Relationships     Social connections:     Talks on phone: None     Gets together: None     Attends Congregational service: None     Active member of club or organization: None     Attends meetings of clubs or organizations: None     Relationship status: None     Intimate partner violence:     Fear of current or ex partner: None     Emotionally abused: None     Physically abused: None     Forced sexual activity: None   Other Topics Concern     Parent/sibling w/ CABG, MI or angioplasty before 65F 55M? No   Social History Narrative     None       ROS:   Constitutional: No fever, chills, or sweats. No weight gain/loss   ENT: No visual disturbance, ear ache, epistaxis, sore throat  Allergies/Immunologic: Negative.   Respiratory: No cough, hemoptysia  Cardiovascular: As per HPI  GI: No nausea, vomiting, hematemesis, melena, or hematochezia  : No urinary frequency, dysuria, or hematuria  Integument: Negative  Psychiatric: Negative  Neuro: Negative  Endocrinology: Negative   Musculoskeletal: Negative    EXAM:  /70 (BP Location: Left arm, Patient Position: Chair, Cuff Size: Adult Large)   Pulse 77   Ht 1.638 m (5' 4.5\")   Wt 90.5 kg " (199 lb 9.6 oz)   SpO2 97%   BMI 33.73 kg/m    In general, the patient is a pleasant male in no apparent distress.    HEENT: NC/AT.  PERRLA.  EOMI.  Sclerae white, not injected.  Nares clear.  Pharynx without erythema or exudate.  Dentition intact.    Neck: No adenopathy.  No thyromegaly. Carotids +4/4 bilaterally without bruits.  No jugular venous distension.   Heart: RRR. Normal S1, S2 splits physiologically. No murmur, rub, click, or gallop. The PMI is in the 5th ICS in the midclavicular line. There is no heave.    Lungs: CTA.  No ronchi, wheezes, rales.  No dullness to percussion.   Abdomen: Soft, nontender, nondistended. No organomegaly.  No bruits.   Extremities: No clubbing, cyanosis, or edema.  The pulses are +4/4 at the radial, brachial, femoral, popliteal, DP, and PT sites bilaterally.  No bruits are noted.  Neurologic: Alert and oriented to person/place/time, normal speech, gait and affect  Skin: No petechiae, purpura or rash.    Labs:  LIPID RESULTS:  Lab Results   Component Value Date    CHOL 91 08/16/2018    HDL 25 (L) 08/16/2018    LDL 7 08/16/2018    TRIG 297 (H) 08/16/2018    CHOLHDLRATIO 2.6 06/27/2015    NHDL 66 08/16/2018       LIVER ENZYME RESULTS:  Lab Results   Component Value Date    AST 19 08/16/2018    ALT 26 08/16/2018       CBC RESULTS:  Lab Results   Component Value Date    WBC 7.8 03/11/2019    RBC 4.51 03/11/2019    HGB 13.6 03/11/2019    HCT 41.9 03/11/2019    MCV 93 03/11/2019    MCH 30.2 03/11/2019    MCHC 32.5 03/11/2019    RDW 13.5 03/11/2019     03/11/2019       BMP RESULTS:  Lab Results   Component Value Date     03/11/2019    POTASSIUM 4.9 03/11/2019    CHLORIDE 101 03/11/2019    CO2 26 03/11/2019    ANIONGAP 8 03/11/2019     (H) 03/11/2019    BUN 43 (H) 03/11/2019    CR 2.17 (H) 03/11/2019    GFRESTIMATED 31 (L) 03/11/2019    GFRESTBLACK 36 (L) 03/11/2019    BRYANNA 8.4 (L) 03/11/2019        A1C RESULTS:  Lab Results   Component Value Date    A1C 7.6 (H)  10/16/2018       INR RESULTS:  Lab Results   Component Value Date    INR 0.97 11/01/2018    INR 1.01 04/02/2015       Procedures:      Assessment and Plan:     Mr. Lucian Oliveira is a 61-year-old with ischemic cardiomyopathy and severe LV systolic dysfunction who is currently on optimal guideline-directed medical therapy.      He is currently functional class II-III.  He is not significantly volume overloaded.  He has no evidence of end-organ hypoperfusion. He continues to remain stable from a heart failure standpoint despite his VO2 on his cardiopulmonary stress test. He continues to be active. He is blood type O.  He is on the heavier side.  He would have a long wait time on a cardiac transplant list.  Furthermore, it is difficult for someone of his blood type and size to get a heart transplant as a status 2.  He is not inotrope dependent now. Although it is low, his VO2 has been stable for the last 2 years. Finally, his 1-year mortality based on SHFM is ~ 8% ( less than transplant or VAD).  In view of this, we will continue to monitor him on medical therapy for now with close monitoring.     Overall, we are worried about his low cardiac index and low VO2, however he remains asymptomatic. We will repeat RHC, CPX, and TTE in 3/2019 and will have him follow-up in clinic afterwards for decision on starting inotrope and consideration of starting advanced therapies.    Mario Alberto Carr M.D.  Cardiology Fellow, PGY-4    He can return to clinic in six months. Mr. Oliveira was discussed with Dr. Sheridan.    I have personally seen and examined the patient, and then discussed with Dr. Carr, and agree with the findings and plan in this note. I have reviewed today's vital signs, medications, labs, and imaging.     Sincerely,    Arvin Sheridan MD   [facility]  Section of Advanced Heart Failure   Cardiovascular Division  [facility]   [phone]          CC  Patient Care Team:  Kathe  Anna Arriaga PA-C as PCP - General (Physician Assistant)  Anna Archuleta PA-C as Assigned PCP  Cassandra Mcgovern NP as Nurse Practitioner (Cardiology)  Diane Watts APRN CNP as Nurse Practitioner (Cardiology)  Arvin Sheridan MD as MD (Cardiology)  Yue Dowd MD as MD (INTERNAL MEDICINE - ENDOCRINOLOGY, DIABETES & METABOLISM)  Suyapa Dias RN as Diabetes Educator (Diabetes Education)  Meenu Zuñiga RN as Nurse Coordinator (Cardiology)  ARVIN SHERIDAN

## 2019-03-20 DIAGNOSIS — I50.22 CHRONIC SYSTOLIC HEART FAILURE (H): Primary | Chronic | ICD-10-CM

## 2019-03-25 ENCOUNTER — HOSPITAL ENCOUNTER (OUTPATIENT)
Facility: CLINIC | Age: 64
Discharge: HOME | End: 2019-03-25
Attending: INTERNAL MEDICINE | Admitting: INTERNAL MEDICINE
Payer: MEDICARE

## 2019-03-25 ENCOUNTER — ANCILLARY PROCEDURE (OUTPATIENT)
Dept: CARDIOLOGY | Facility: CLINIC | Age: 64
End: 2019-03-25
Attending: INTERNAL MEDICINE
Payer: MEDICARE

## 2019-03-25 ENCOUNTER — APPOINTMENT (OUTPATIENT)
Dept: LAB | Facility: CLINIC | Age: 64
End: 2019-03-25
Attending: INTERNAL MEDICINE
Payer: MEDICARE

## 2019-03-25 ENCOUNTER — APPOINTMENT (OUTPATIENT)
Dept: MEDSURG UNIT | Facility: CLINIC | Age: 64
End: 2019-03-25
Attending: INTERNAL MEDICINE
Payer: MEDICARE

## 2019-03-25 VITALS
SYSTOLIC BLOOD PRESSURE: 116 MMHG | HEIGHT: 64 IN | HEART RATE: 83 BPM | BODY MASS INDEX: 33.46 KG/M2 | DIASTOLIC BLOOD PRESSURE: 75 MMHG | OXYGEN SATURATION: 97 % | WEIGHT: 196 LBS

## 2019-03-25 VITALS
SYSTOLIC BLOOD PRESSURE: 128 MMHG | OXYGEN SATURATION: 97 % | HEART RATE: 78 BPM | RESPIRATION RATE: 16 BRPM | DIASTOLIC BLOOD PRESSURE: 63 MMHG

## 2019-03-25 VITALS
BODY MASS INDEX: 33.63 KG/M2 | SYSTOLIC BLOOD PRESSURE: 108 MMHG | OXYGEN SATURATION: 97 % | DIASTOLIC BLOOD PRESSURE: 55 MMHG | WEIGHT: 197 LBS | HEIGHT: 64 IN | HEART RATE: 75 BPM | TEMPERATURE: 98.7 F

## 2019-03-25 DIAGNOSIS — I50.22 CHRONIC SYSTOLIC HEART FAILURE (H): ICD-10-CM

## 2019-03-25 DIAGNOSIS — I50.9 CHF (NYHA CLASS II, ACC/AHA STAGE C) (H): Chronic | ICD-10-CM

## 2019-03-25 DIAGNOSIS — I10 HYPERTENSION GOAL BP (BLOOD PRESSURE) < 140/90: Chronic | ICD-10-CM

## 2019-03-25 DIAGNOSIS — I25.739 CORONARY ARTERY DISEASE INVOLVING NONAUTOLOGOUS BIOLOGICAL CORONARY BYPASS GRAFT WITH ANGINA PECTORIS (H): ICD-10-CM

## 2019-03-25 LAB
ANION GAP SERPL CALCULATED.3IONS-SCNC: 7 MMOL/L (ref 3–14)
BUN SERPL-MCNC: 38 MG/DL (ref 7–30)
CALCIUM SERPL-MCNC: 9.4 MG/DL (ref 8.5–10.1)
CHLORIDE SERPL-SCNC: 103 MMOL/L (ref 94–109)
CO2 SERPL-SCNC: 26 MMOL/L (ref 20–32)
CREAT SERPL-MCNC: 2.38 MG/DL (ref 0.66–1.25)
ERYTHROCYTE [DISTWIDTH] IN BLOOD BY AUTOMATED COUNT: 13.3 % (ref 10–15)
GFR SERPL CREATININE-BSD FRML MDRD: 27 ML/MIN/{1.73_M2}
GLUCOSE SERPL-MCNC: 106 MG/DL (ref 70–99)
HCT VFR BLD AUTO: 41.9 % (ref 40–53)
HGB BLD-MCNC: 13.7 G/DL (ref 13.3–17.7)
INR PPP: 1.04 (ref 0.86–1.14)
MCH RBC QN AUTO: 31.1 PG (ref 26.5–33)
MCHC RBC AUTO-ENTMCNC: 32.7 G/DL (ref 31.5–36.5)
MCV RBC AUTO: 95 FL (ref 78–100)
NT-PROBNP SERPL-MCNC: 1263 PG/ML (ref 0–125)
PLATELET # BLD AUTO: 234 10E9/L (ref 150–450)
POTASSIUM SERPL-SCNC: 4.8 MMOL/L (ref 3.4–5.3)
RBC # BLD AUTO: 4.4 10E12/L (ref 4.4–5.9)
SODIUM SERPL-SCNC: 136 MMOL/L (ref 133–144)
WBC # BLD AUTO: 10.2 10E9/L (ref 4–11)

## 2019-03-25 PROCEDURE — 93451 RIGHT HEART CATH: CPT | Mod: 26 | Performed by: INTERNAL MEDICINE

## 2019-03-25 PROCEDURE — 40000166 ZZH STATISTIC PP CARE STAGE 1

## 2019-03-25 PROCEDURE — 94621 CARDIOPULM EXERCISE TESTING: CPT

## 2019-03-25 PROCEDURE — 99215 OFFICE O/P EST HI 40 MIN: CPT | Mod: ZP | Performed by: INTERNAL MEDICINE

## 2019-03-25 PROCEDURE — 25000125 ZZHC RX 250: Performed by: INTERNAL MEDICINE

## 2019-03-25 PROCEDURE — 80048 BASIC METABOLIC PNL TOTAL CA: CPT | Performed by: INTERNAL MEDICINE

## 2019-03-25 PROCEDURE — 83880 ASSAY OF NATRIURETIC PEPTIDE: CPT | Performed by: INTERNAL MEDICINE

## 2019-03-25 PROCEDURE — G0463 HOSPITAL OUTPT CLINIC VISIT: HCPCS | Mod: 25,ZF

## 2019-03-25 PROCEDURE — 85027 COMPLETE CBC AUTOMATED: CPT | Performed by: INTERNAL MEDICINE

## 2019-03-25 PROCEDURE — 27210794 ZZH OR GENERAL SUPPLY STERILE: Performed by: INTERNAL MEDICINE

## 2019-03-25 PROCEDURE — 85610 PROTHROMBIN TIME: CPT | Performed by: INTERNAL MEDICINE

## 2019-03-25 PROCEDURE — T1013 SIGN LANG/ORAL INTERPRETER: HCPCS | Mod: U3,ZF

## 2019-03-25 PROCEDURE — 93451 RIGHT HEART CATH: CPT | Performed by: INTERNAL MEDICINE

## 2019-03-25 PROCEDURE — 36415 COLL VENOUS BLD VENIPUNCTURE: CPT | Performed by: INTERNAL MEDICINE

## 2019-03-25 PROCEDURE — 94621 CARDIOPULM EXERCISE TESTING: CPT | Mod: 26 | Performed by: INTERNAL MEDICINE

## 2019-03-25 RX ORDER — CARVEDILOL 25 MG/1
12.5 TABLET ORAL 2 TIMES DAILY WITH MEALS
Qty: 180 TABLET | Refills: 1 | Status: SHIPPED | OUTPATIENT
Start: 2019-03-25 | End: 2019-11-25

## 2019-03-25 RX ORDER — ISOSORBIDE MONONITRATE 60 MG/1
60 TABLET, EXTENDED RELEASE ORAL DAILY
Qty: 90 TABLET | Refills: 1 | Status: SHIPPED | OUTPATIENT
Start: 2019-03-25 | End: 2019-09-06

## 2019-03-25 RX ORDER — LIDOCAINE 40 MG/G
CREAM TOPICAL
Status: DISCONTINUED | OUTPATIENT
Start: 2019-03-25 | End: 2019-03-26 | Stop reason: HOSPADM

## 2019-03-25 RX ORDER — FUROSEMIDE 20 MG
20 TABLET ORAL DAILY
Qty: 90 TABLET | Refills: 1 | Status: SHIPPED | OUTPATIENT
Start: 2019-03-25 | End: 2019-08-26 | Stop reason: DRUGHIGH

## 2019-03-25 RX ORDER — HYDRALAZINE HYDROCHLORIDE 25 MG/1
25 TABLET, FILM COATED ORAL 3 TIMES DAILY
Qty: 270 TABLET | Refills: 1 | Status: SHIPPED | OUTPATIENT
Start: 2019-03-25 | End: 2019-09-06

## 2019-03-25 RX ADMIN — LIDOCAINE: 40 CREAM TOPICAL at 10:49

## 2019-03-25 RX ADMIN — Medication 6 ML: at 16:00

## 2019-03-25 ASSESSMENT — MIFFLIN-ST. JEOR
SCORE: 1589.59
SCORE: 1585.05

## 2019-03-25 ASSESSMENT — PAIN SCALES - GENERAL: PAINLEVEL: NO PAIN (0)

## 2019-03-25 NOTE — Clinical Note
dry, intact, no bleeding and no hematoma. 7 Fr sheath removed from the RIJ vein. Manual pressure held. Hemostasis obtained.

## 2019-03-25 NOTE — NURSING NOTE
Chief Complaint   Patient presents with     Follow Up     RTN HF: 63 year old male presents with systolic heart failure for 2 week follow up with labs, cpx and rhc prior     Vitals were taken and medications were reconciled.     Gissel Carr CMA    4:39 PM

## 2019-03-25 NOTE — LETTER
3/25/2019      RE: Lucian Oliveira  [address] [facility] [address]                Cardiology Consultation      HPI:     We had the pleasure of seeing Mr. Lucian Oliveira for followup in our Advanced Heart Failure Clinic.  As you know, he is a 62-year-old gentleman with ischemic cardiomyopathy and severe LV systolic dysfunction who is currently on optimal guideline-directed medical therapy.      We saw him in clinic 2 weeks ago.  We recommended him to have repeat cardiopulmonary stress testing and right heart catheterization, he returns today for follow-up after completing these testing.    He exercised for 7 minutes and 42 seconds on his cardiopulmonary stress testing.  He achieved anaerobic threshold with an RER of 1.19.  His VO2 max was significantly reduced at 10.2 mL/kg/min.  His VE VCO2 slope was elevated at 36.6.  His blood pressure dropped during exercise from 104/60-90 5/55.  He had normal breathing reserve.    His right heart catheterization revealed an RA pressure of 4, RV of 22/4, PA of 22/10 with a mean of 11, pulmonary capillary wedge pressure of 6, Jacy cardiac output of 3.1 with an index of 1.6, thermodilution cardiac output of 3.2 with an index of 1.7, PA saturation of 62%, PVR of 1.6 Wood units, systemic blood pressure of 123/65 with a mean of 85 mmHg.  His calculated SVR was 2025.    His repeat echocardiogram shows severely reduced left ventricular systolic function.  His estimated ejection fraction was 25%.  His left ventricle was severely dilated with a left ventricular end-diastolic diameter of 6.3 cm.  He had regional wall motion abnormalities with hypokinesis of the anteroseptal walls.  He had normal right ventricular size and function.  No valvular abnormalities.  He had no pericardial effusion.    Overall, clinically, Mr. Oliveira states that he feels stable.  He is still able to do his activities of daily living and do minimal activities including gardening.  He has not had any  worsening lower extremity swelling or abdominal distention.  He has not had any lightheadedness dizziness or syncope.  No ICD shocks.  No recent hospitalization for decompressive heart failure.    PAST MEDICAL HISTORY:  Past Medical History:   Diagnosis Date     CAD (coronary artery disease)      CHF (congestive heart failure) (H)      CKD (chronic kidney disease), stage III (H)      Cortical cataract of both eyes      Diabetes (H)      Hyperlipidemia      Hypertension      Ischemic cardiomyopathy      Obesity      CHANTEL (obstructive sleep apnea)      Osteoarthritis      Current medications:      Current Outpatient Medications   Medication Sig     aspirin 81 MG tablet Take 1 tablet (81 mg) by mouth daily     atorvastatin (LIPITOR) 40 MG tablet Take 1 tablet (40 mg) by mouth daily     B-D U/F insulin pen needle USE 1 NEEDLE DAILY AS DIRECTED -DUE FOR APPOINTMENT FOR FURTHER REFILLS     blood glucose (ACCU-CHEK SMARTVIEW) test strip USE TO TEST THREE TIMES DAILY AS DIRECTED     blood glucose monitoring (ACCU-CHEK FELIPE SMARTVIEW) meter device kit Use to test blood sugar 3-4 times daily, as directed.     carvedilol (COREG) 25 MG tablet Take 0.5 tablets (12.5 mg) by mouth 2 times daily (with meals)     cholecalciferol (VITAMIN  -D) 1000 units capsule Take 1 capsule (1,000 Units) by mouth daily     clopidogrel (PLAVIX) 75 MG tablet TAKE ONE TABLET BY MOUTH EVERY DAY     continuous blood glucose monitoring (FREESTYLE JEREMY) sensor For use with Freestyle Jeremy Flash  for continuous monitioring of blood glucose levels. Replace sensor every 10 days.     furosemide (LASIX) 20 MG tablet Take 1 tablet (20 mg) by mouth daily     glimepiride (AMARYL) 4 MG tablet Take 1 tablet (4 mg) by mouth every morning (before breakfast)     hydrALAZINE (APRESOLINE) 25 MG tablet Take 1 tablet (25 mg) by mouth 3 times daily     insulin glargine (LANTUS SOLOSTAR) 100 UNIT/ML pen Take 8 units in the morning and 40 units in the evening      "isosorbide mononitrate (IMDUR) 60 MG 24 hr tablet Take 1 tablet (60 mg) by mouth daily     losartan (COZAAR) 50 MG tablet Take 1 tablet (50 mg) by mouth daily     order for DME Auto CPAP 8-15 cmH20     sitagliptin (JANUVIA) 50 MG tablet Take 1 tablet (50 mg) by mouth daily     nitroglycerin (NITROSTAT) 0.4 MG SL tablet Place 1 tablet (0.4 mg) under the tongue every 5 minutes as needed for chest pain If you are still having symptoms after 3 doses (15 minutes) call 911. (Patient not taking: Reported on 3/25/2019)     No current facility-administered medications for this visit.      Facility-Administered Medications Ordered in Other Visits   Medication     sodium chloride (PF) 0.9% PF flush 10 mL     PAST SURGICAL HISTORY:  Past Surgical History:   Procedure Laterality Date     C CABG, ARTERY-VEIN, THREE  02/2008     CV RIGHT HEART CATH N/A 3/25/2019    Procedure: CV RIGHT HEART CATH;  Surgeon: Moises Santos MD;  Location: [facility] CARDIAC CATH LAB     IMPLANT AUTOMATIC IMPLANTABLE CARDIOVERTER DEFIBRILLATOR         ROS:   Constitutional: No fever, chills, or sweats. No weight gain/loss   ENT: No visual disturbance, ear ache, epistaxis, sore throat  Allergies/Immunologic: Negative.   Respiratory: No cough, hemoptysia  Cardiovascular: As per HPI  GI: No nausea, vomiting, hematemesis, melena, or hematochezia  : No urinary frequency, dysuria, or hematuria  Integument: Negative  Psychiatric: Negative  Neuro: Negative  Endocrinology: Negative   Musculoskeletal: Negative    EXAM:  /75 (BP Location: Left arm, Patient Position: Chair, Cuff Size: Adult Large)   Pulse 83   Ht 1.626 m (5' 4\")   Wt 88.9 kg (196 lb)   SpO2 97%   BMI 33.64 kg/m     He was comfortable.  He was in no apparent distress.  His neck exam revealed no jugular venous distention.  His carotids were 1+ bilaterally.  He had no pallor cyanosis or jaundice.  His cardiac auscultation revealed normal S1 and S2 with no murmur rub or " gallop.  Auscultation of his lungs reveal equal air entry on both sides with no added sounds.  His abdomen was soft with normal bowels and no tenderness no rigidity no guarding.  He had no focal neurological deficit.  His extremities showed no edema.  His extremities are warm and well perfused.    Labs:    CBC RESULTS:  Lab Results   Component Value Date    WBC 10.2 03/25/2019    RBC 4.40 03/25/2019    HGB 13.7 03/25/2019    HCT 41.9 03/25/2019    MCV 95 03/25/2019    MCH 31.1 03/25/2019    MCHC 32.7 03/25/2019    RDW 13.3 03/25/2019     03/25/2019       BMP RESULTS:  Lab Results   Component Value Date     03/25/2019    POTASSIUM 4.8 03/25/2019    CHLORIDE 103 03/25/2019    CO2 26 03/25/2019    ANIONGAP 7 03/25/2019     (H) 03/25/2019    BUN 38 (H) 03/25/2019    CR 2.38 (H) 03/25/2019    GFRESTIMATED 27 (L) 03/25/2019    GFRESTBLACK 32 (L) 03/25/2019    BRYANNA 9.4 03/25/2019        A1C RESULTS:  Lab Results   Component Value Date    A1C 8.5 (H) 03/11/2019       Assessment and Plan:     Mr. Lucian Oliveira is a 63-year-old with     1. Ischemic cardiomyopathy and severe LV systolic dysfunction who is currently on optimal guideline-directed medical therapy  2. Poorly controlled type 2 diabetes mellitus  3. Obesity  4.  Chronic kidney disease    He states that he feels reasonably well and has not been very symptomatic.  However, his cardiopulmonary stress test is concerning with significantly reduced VO2.  We generally recommend advanced heart failure therapies then VO2 max is below 12 mL/kg/min and patients were on beta-blocker therapy.  His VO2 max at present is 10.2 mL/kg/min.  Consistent with his reduced VO2, on right heart catheterization, he has severely reduced cardiac index.  On a positive note, his biventricular filling pressures are normal.    With respect to treatment, although he feels better given his low cardiac index, significantly abnormal cardiopulmonary exercise testing, and worsening  renal function I have recommended him to do the following:    #1 decrease his Coreg to 12.5 mg twice a day  #2 start hydralazine 25 mg 3 times a day for afterload reduction.  His SVR was significantly elevated at 2000.  #3 increase his Imdur due to 60 mg daily 1 week after starting hydralazine if he has no lightheadedness or dizziness  #4 decrease his Lasix to 20 mg daily given his low biventricular filling pressure and worsening creatinine  #5 given his significantly abnormal cardiopulmonary stress testing and low cardiac index, we will start workup for advanced heart failure options.  With respect to transplant, he may need heart kidney transplant, for which he is reaching the age limit for combined heart and kidney transplant.  Stage II heart transplantation followed by kidney transplantation is a potential option.  Otherwise left ventricular assist device as a bridge to transplant if his renal function had to improve or as destination therapy would be the next best best option.  His diabetes needs to be under better control.    He will return to see us in clinic 4-6 weeks.  He will call us in the interim if any further worsening symptoms.    It was a pleasure meeting Mr. Lucian Oliveira in our heart failure clinic at [facility].  We thank you for involving us in his care.  Please do not hesitate to call us in the interim of any further questions.     Sincerely,    Arvin Sheridan MD   [facility]  Section of Advanced Heart Failure   Cardiovascular Division  [facility]   [phone]          CC  Patient Care Team:  Anna Archuleta PA-C as PCP - General (Physician Assistant)  Anna Archuleta PA-C as Assigned PCP  Cassandra Mcgovern NP as Nurse Practitioner (Cardiology)  Diane Watts APRN CNP as Nurse Practitioner (Cardiology)  Arvin Sheridan MD as MD (Cardiology)  Yue Dowd MD as MD (INTERNAL MEDICINE - ENDOCRINOLOGY,  DIABETES & METABOLISM)  Suyapa Dias, RN as Diabetes Educator (Diabetes Education)  Meenu Zuñiga, AURORA as Nurse Coordinator (Cardiology)  SHONDA MERCHANT MD

## 2019-03-25 NOTE — NURSING NOTE
Diet: Patient instructed regarding a heart failure healthy diet, including discussion of reduced fat and 2000 mg daily sodium restriction, daily weights, medication purpose and compliance, fluid restrictions and resources for patient and family to access for assistance with heart failure management.       Labs: Patient was given results of the laboratory testing obtained today and patient was instructed about when to return for the next laboratory testing.     Med Reconcile: Reviewed and verified all current medications with the patient. The updated medication list was printed and given to the patient. DECREASE coreg to 12.5 mg bid. DECREASE lasix to 20mg daily. INCREASE imdur to 60mg daily. START hydralazine 25mg TID.    Return Appointment: Patient given instructions regarding scheduling next clinic visit. RTC may 7th with TT    Patient stated he understood all health information given and agreed to call with further questions or concerns.       Meenu Zuñiga RN

## 2019-03-25 NOTE — LETTER
3/25/2019      RE: Lucian Oliveira  [address] [facility] [address]                Cardiology Consultation      HPI:     We had the pleasure of seeing Mr. Lucian Oliveira for followup in our Advanced Heart Failure Clinic.  As you know, he is a 62-year-old gentleman with ischemic cardiomyopathy and severe LV systolic dysfunction who is currently on optimal guideline-directed medical therapy.      We saw him in clinic 2 weeks ago.  We recommended him to have repeat cardiopulmonary stress testing and right heart catheterization, he returns today for follow-up after completing these testing.    He exercised for 7 minutes and 42 seconds on his cardiopulmonary stress testing.  He achieved anaerobic threshold with an RER of 1.19.  His VO2 max was significantly reduced at 10.2 mL/kg/min.  His VE VCO2 slope was elevated at 36.6.  His blood pressure dropped during exercise from 104/60-90 5/55.  He had normal breathing reserve.    His right heart catheterization revealed an RA pressure of 4, RV of 22/4, PA of 22/10 with a mean of 11, pulmonary capillary wedge pressure of 6, Jacy cardiac output of 3.1 with an index of 1.6, thermodilution cardiac output of 3.2 with an index of 1.7, PA saturation of 62%, PVR of 1.6 Wood units, systemic blood pressure of 123/65 with a mean of 85 mmHg.  His calculated SVR was 2025.    His repeat echocardiogram shows severely reduced left ventricular systolic function.  His estimated ejection fraction was 25%.  His left ventricle was severely dilated with a left ventricular end-diastolic diameter of 6.3 cm.  He had regional wall motion abnormalities with hypokinesis of the anteroseptal walls.  He had normal right ventricular size and function.  No valvular abnormalities.  He had no pericardial effusion.    Overall, clinically, Mr. Oliveira states that he feels stable.  He is still able to do his activities of daily living and do minimal activities including gardening.  He has not had any  worsening lower extremity swelling or abdominal distention.  He has not had any lightheadedness dizziness or syncope.  No ICD shocks.  No recent hospitalization for decompressive heart failure.    PAST MEDICAL HISTORY:  Past Medical History:   Diagnosis Date     CAD (coronary artery disease)      CHF (congestive heart failure) (H)      CKD (chronic kidney disease), stage III (H)      Cortical cataract of both eyes      Diabetes (H)      Hyperlipidemia      Hypertension      Ischemic cardiomyopathy      Obesity      CHANTEL (obstructive sleep apnea)      Osteoarthritis      Current medications:      Current Outpatient Medications   Medication Sig     aspirin 81 MG tablet Take 1 tablet (81 mg) by mouth daily     atorvastatin (LIPITOR) 40 MG tablet Take 1 tablet (40 mg) by mouth daily     B-D U/F insulin pen needle USE 1 NEEDLE DAILY AS DIRECTED -DUE FOR APPOINTMENT FOR FURTHER REFILLS     blood glucose (ACCU-CHEK SMARTVIEW) test strip USE TO TEST THREE TIMES DAILY AS DIRECTED     blood glucose monitoring (ACCU-CHEK FELIPE SMARTVIEW) meter device kit Use to test blood sugar 3-4 times daily, as directed.     carvedilol (COREG) 25 MG tablet Take 0.5 tablets (12.5 mg) by mouth 2 times daily (with meals)     cholecalciferol (VITAMIN  -D) 1000 units capsule Take 1 capsule (1,000 Units) by mouth daily     clopidogrel (PLAVIX) 75 MG tablet TAKE ONE TABLET BY MOUTH EVERY DAY     continuous blood glucose monitoring (FREESTYLE JEREMY) sensor For use with Freestyle Jeremy Flash  for continuous monitioring of blood glucose levels. Replace sensor every 10 days.     furosemide (LASIX) 20 MG tablet Take 1 tablet (20 mg) by mouth daily     glimepiride (AMARYL) 4 MG tablet Take 1 tablet (4 mg) by mouth every morning (before breakfast)     hydrALAZINE (APRESOLINE) 25 MG tablet Take 1 tablet (25 mg) by mouth 3 times daily     insulin glargine (LANTUS SOLOSTAR) 100 UNIT/ML pen Take 8 units in the morning and 40 units in the evening      "isosorbide mononitrate (IMDUR) 60 MG 24 hr tablet Take 1 tablet (60 mg) by mouth daily     losartan (COZAAR) 50 MG tablet Take 1 tablet (50 mg) by mouth daily     order for DME Auto CPAP 8-15 cmH20     sitagliptin (JANUVIA) 50 MG tablet Take 1 tablet (50 mg) by mouth daily     nitroglycerin (NITROSTAT) 0.4 MG SL tablet Place 1 tablet (0.4 mg) under the tongue every 5 minutes as needed for chest pain If you are still having symptoms after 3 doses (15 minutes) call 911. (Patient not taking: Reported on 3/25/2019)     No current facility-administered medications for this visit.      Facility-Administered Medications Ordered in Other Visits   Medication     sodium chloride (PF) 0.9% PF flush 10 mL     PAST SURGICAL HISTORY:  Past Surgical History:   Procedure Laterality Date     C CABG, ARTERY-VEIN, THREE  02/2008     CV RIGHT HEART CATH N/A 3/25/2019    Procedure: CV RIGHT HEART CATH;  Surgeon: Moises Santos MD;  Location: [facility] CARDIAC CATH LAB     IMPLANT AUTOMATIC IMPLANTABLE CARDIOVERTER DEFIBRILLATOR         ROS:   Constitutional: No fever, chills, or sweats. No weight gain/loss   ENT: No visual disturbance, ear ache, epistaxis, sore throat  Allergies/Immunologic: Negative.   Respiratory: No cough, hemoptysia  Cardiovascular: As per HPI  GI: No nausea, vomiting, hematemesis, melena, or hematochezia  : No urinary frequency, dysuria, or hematuria  Integument: Negative  Psychiatric: Negative  Neuro: Negative  Endocrinology: Negative   Musculoskeletal: Negative    EXAM:  /75 (BP Location: Left arm, Patient Position: Chair, Cuff Size: Adult Large)   Pulse 83   Ht 1.626 m (5' 4\")   Wt 88.9 kg (196 lb)   SpO2 97%   BMI 33.64 kg/m     He was comfortable.  He was in no apparent distress.  His neck exam revealed no jugular venous distention.  His carotids were 1+ bilaterally.  He had no pallor cyanosis or jaundice.  His cardiac auscultation revealed normal S1 and S2 with no murmur rub or " gallop.  Auscultation of his lungs reveal equal air entry on both sides with no added sounds.  His abdomen was soft with normal bowels and no tenderness no rigidity no guarding.  He had no focal neurological deficit.  His extremities showed no edema.  His extremities are warm and well perfused.    Labs:    CBC RESULTS:  Lab Results   Component Value Date    WBC 10.2 03/25/2019    RBC 4.40 03/25/2019    HGB 13.7 03/25/2019    HCT 41.9 03/25/2019    MCV 95 03/25/2019    MCH 31.1 03/25/2019    MCHC 32.7 03/25/2019    RDW 13.3 03/25/2019     03/25/2019       BMP RESULTS:  Lab Results   Component Value Date     03/25/2019    POTASSIUM 4.8 03/25/2019    CHLORIDE 103 03/25/2019    CO2 26 03/25/2019    ANIONGAP 7 03/25/2019     (H) 03/25/2019    BUN 38 (H) 03/25/2019    CR 2.38 (H) 03/25/2019    GFRESTIMATED 27 (L) 03/25/2019    GFRESTBLACK 32 (L) 03/25/2019    BRYANNA 9.4 03/25/2019        A1C RESULTS:  Lab Results   Component Value Date    A1C 8.5 (H) 03/11/2019       Assessment and Plan:     Mr. Lucian Oliveira is a 63-year-old with     1. Ischemic cardiomyopathy and severe LV systolic dysfunction who is currently on optimal guideline-directed medical therapy  2. Poorly controlled type 2 diabetes mellitus  3. Obesity  4.  Chronic kidney disease    He states that he feels reasonably well and has not been very symptomatic.  However, his cardiopulmonary stress test is concerning with significantly reduced VO2.  We generally recommend advanced heart failure therapies then VO2 max is below 12 mL/kg/min and patients were on beta-blocker therapy.  His VO2 max at present is 10.2 mL/kg/min.  Consistent with his reduced VO2, on right heart catheterization, he has severely reduced cardiac index.  On a positive note, his biventricular filling pressures are normal.    With respect to treatment, although he feels better given his low cardiac index, significantly abnormal cardiopulmonary exercise testing, and worsening  renal function I have recommended him to do the following:    #1 decrease his Coreg to 12.5 mg twice a day  #2 start hydralazine 25 mg 3 times a day for afterload reduction.  His SVR was significantly elevated at 2000.  #3 increase his Imdur due to 60 mg daily 1 week after starting hydralazine if he has no lightheadedness or dizziness  #4 decrease his Lasix to 20 mg daily given his low biventricular filling pressure and worsening creatinine  #5 given his significantly abnormal cardiopulmonary stress testing and low cardiac index, we will start workup for advanced heart failure options.  With respect to transplant, he may need heart kidney transplant, for which he is reaching the age limit for combined heart and kidney transplant.  Stage II heart transplantation followed by kidney transplantation is a potential option.  Otherwise left ventricular assist device as a bridge to transplant if his renal function had to improve or as destination therapy would be the next best best option.  His diabetes needs to be under better control.    He will return to see us in clinic 4-6 weeks.  He will call us in the interim if any further worsening symptoms.    It was a pleasure meeting Mr. Lucian Oliveira in our heart failure clinic at [facility].  We thank you for involving us in his care.  Please do not hesitate to call us in the interim of any further questions.     Sincerely,    Arvin Sheridan MD   [facility]  Section of Advanced Heart Failure   Cardiovascular Division  [facility]   [phone]          CC  Patient Care Team:  Anna Archuleta PA-C as PCP - General (Physician Assistant)  Anna Archuleta PA-C as Assigned PCP  Cassandra Mcgovern NP as Nurse Practitioner (Cardiology)  Diane Watts APRN CNP as Nurse Practitioner (Cardiology)  Arvin Sheridan MD as MD (Cardiology)  Yue Dowd MD as MD (INTERNAL MEDICINE - ENDOCRINOLOGY,  DIABETES & METABOLISM)  Suyapa Dias, RN as Diabetes Educator (Diabetes Education)  Meenu Zuñiga, [place] as Nurse Coordinator (Cardiology)  SHONDA MERCHANT MD

## 2019-03-25 NOTE — LETTER
3/25/2019      RE: Lucian Oliveira  [address] [facility] [address]       Dear Colleague,    Thank you for the opportunity to participate in the care of your patient, Lucian Oliveira, at the [facility] HEART [facility] at [facility]. Please see a copy of my visit note below.             Cardiology Consultation      HPI:     We had the pleasure of seeing Mr. Lucian Oliveira for followup in our Advanced Heart Failure Clinic.  As you know, he is a 62-year-old gentleman with ischemic cardiomyopathy and severe LV systolic dysfunction who is currently on optimal guideline-directed medical therapy.      We saw him in clinic 2 weeks ago.  We recommended him to have repeat cardiopulmonary stress testing and right heart catheterization, he returns today for follow-up after completing these testing.    He exercised for 7 minutes and 42 seconds on his cardiopulmonary stress testing.  He achieved anaerobic threshold with an RER of 1.19.  His VO2 max was significantly reduced at 10.2 mL/kg/min.  His VE VCO2 slope was elevated at 36.6.  His blood pressure dropped during exercise from 104/60-90 5/55.  He had normal breathing reserve.    His right heart catheterization revealed an RA pressure of 4, RV of 22/4, PA of 22/10 with a mean of 11, pulmonary capillary wedge pressure of 6, Jacy cardiac output of 3.1 with an index of 1.6, thermodilution cardiac output of 3.2 with an index of 1.7, PA saturation of 62%, PVR of 1.6 Wood units, systemic blood pressure of 123/65 with a mean of 85 mmHg.  His calculated SVR was 2025.    His repeat echocardiogram shows severely reduced left ventricular systolic function.  His estimated ejection fraction was 25%.  His left ventricle was severely dilated with a left ventricular end-diastolic diameter of 6.3 cm.  He had regional wall motion abnormalities with hypokinesis of the anteroseptal walls.  He had normal right ventricular size and function.  No valvular  abnormalities.  He had no pericardial effusion.    Overall, clinically, Mr. Oliveira states that he feels stable.  He is still able to do his activities of daily living and do minimal activities including gardening.  He has not had any worsening lower extremity swelling or abdominal distention.  He has not had any lightheadedness dizziness or syncope.  No ICD shocks.  No recent hospitalization for decompressive heart failure.    PAST MEDICAL HISTORY:  Past Medical History:   Diagnosis Date     CAD (coronary artery disease)      CHF (congestive heart failure) (H)      CKD (chronic kidney disease), stage III (H)      Cortical cataract of both eyes      Diabetes (H)      Hyperlipidemia      Hypertension      Ischemic cardiomyopathy      Obesity      CHANTEL (obstructive sleep apnea)      Osteoarthritis      Current medications:      Current Outpatient Medications   Medication Sig     aspirin 81 MG tablet Take 1 tablet (81 mg) by mouth daily     atorvastatin (LIPITOR) 40 MG tablet Take 1 tablet (40 mg) by mouth daily     B-D U/F insulin pen needle USE 1 NEEDLE DAILY AS DIRECTED -DUE FOR APPOINTMENT FOR FURTHER REFILLS     blood glucose (ACCU-CHEK SMARTVIEW) test strip USE TO TEST THREE TIMES DAILY AS DIRECTED     blood glucose monitoring (ACCU-CHEK FELIPE SMARTVIEW) meter device kit Use to test blood sugar 3-4 times daily, as directed.     carvedilol (COREG) 25 MG tablet Take 0.5 tablets (12.5 mg) by mouth 2 times daily (with meals)     cholecalciferol (VITAMIN  -D) 1000 units capsule Take 1 capsule (1,000 Units) by mouth daily     clopidogrel (PLAVIX) 75 MG tablet TAKE ONE TABLET BY MOUTH EVERY DAY     continuous blood glucose monitoring (FREESTYLE JEREMY) sensor For use with Freestyle Jeremy Flash  for continuous monitioring of blood glucose levels. Replace sensor every 10 days.     furosemide (LASIX) 20 MG tablet Take 1 tablet (20 mg) by mouth daily     glimepiride (AMARYL) 4 MG tablet Take 1 tablet (4 mg) by mouth  "every morning (before breakfast)     hydrALAZINE (APRESOLINE) 25 MG tablet Take 1 tablet (25 mg) by mouth 3 times daily     insulin glargine (LANTUS SOLOSTAR) 100 UNIT/ML pen Take 8 units in the morning and 40 units in the evening     isosorbide mononitrate (IMDUR) 60 MG 24 hr tablet Take 1 tablet (60 mg) by mouth daily     losartan (COZAAR) 50 MG tablet Take 1 tablet (50 mg) by mouth daily     order for DME Auto CPAP 8-15 cmH20     sitagliptin (JANUVIA) 50 MG tablet Take 1 tablet (50 mg) by mouth daily     nitroglycerin (NITROSTAT) 0.4 MG SL tablet Place 1 tablet (0.4 mg) under the tongue every 5 minutes as needed for chest pain If you are still having symptoms after 3 doses (15 minutes) call 911. (Patient not taking: Reported on 3/25/2019)     No current facility-administered medications for this visit.      Facility-Administered Medications Ordered in Other Visits   Medication     sodium chloride (PF) 0.9% PF flush 10 mL     PAST SURGICAL HISTORY:  Past Surgical History:   Procedure Laterality Date     C CABG, ARTERY-VEIN, THREE  02/2008     CV RIGHT HEART CATH N/A 3/25/2019    Procedure: CV RIGHT HEART CATH;  Surgeon: Moises Santos MD;  Location:  HEART CARDIAC CATH LAB     IMPLANT AUTOMATIC IMPLANTABLE CARDIOVERTER DEFIBRILLATOR         ROS:   Constitutional: No fever, chills, or sweats. No weight gain/loss   ENT: No visual disturbance, ear ache, epistaxis, sore throat  Allergies/Immunologic: Negative.   Respiratory: No cough, hemoptysia  Cardiovascular: As per HPI  GI: No nausea, vomiting, hematemesis, melena, or hematochezia  : No urinary frequency, dysuria, or hematuria  Integument: Negative  Psychiatric: Negative  Neuro: Negative  Endocrinology: Negative   Musculoskeletal: Negative    EXAM:  /75 (BP Location: Left arm, Patient Position: Chair, Cuff Size: Adult Large)   Pulse 83   Ht 1.626 m (5' 4\")   Wt 88.9 kg (196 lb)   SpO2 97%   BMI 33.64 kg/m     He was comfortable.  He was in no " apparent distress.  His neck exam revealed no jugular venous distention.  His carotids were 1+ bilaterally.  He had no pallor cyanosis or jaundice.  His cardiac auscultation revealed normal S1 and S2 with no murmur rub or gallop.  Auscultation of his lungs reveal equal air entry on both sides with no added sounds.  His abdomen was soft with normal bowels and no tenderness no rigidity no guarding.  He had no focal neurological deficit.  His extremities showed no edema.  His extremities are warm and well perfused.    Labs:    CBC RESULTS:  Lab Results   Component Value Date    WBC 10.2 03/25/2019    RBC 4.40 03/25/2019    HGB 13.7 03/25/2019    HCT 41.9 03/25/2019    MCV 95 03/25/2019    MCH 31.1 03/25/2019    MCHC 32.7 03/25/2019    RDW 13.3 03/25/2019     03/25/2019       BMP RESULTS:  Lab Results   Component Value Date     03/25/2019    POTASSIUM 4.8 03/25/2019    CHLORIDE 103 03/25/2019    CO2 26 03/25/2019    ANIONGAP 7 03/25/2019     (H) 03/25/2019    BUN 38 (H) 03/25/2019    CR 2.38 (H) 03/25/2019    GFRESTIMATED 27 (L) 03/25/2019    GFRESTBLACK 32 (L) 03/25/2019    BRYANNA 9.4 03/25/2019        A1C RESULTS:  Lab Results   Component Value Date    A1C 8.5 (H) 03/11/2019       Assessment and Plan:     Mr. Lucian Oliveira is a 63-year-old with     1. Ischemic cardiomyopathy and severe LV systolic dysfunction who is currently on optimal guideline-directed medical therapy  2. Poorly controlled type 2 diabetes mellitus  3. Obesity  4.  Chronic kidney disease    He states that he feels reasonably well and has not been very symptomatic.  However, his cardiopulmonary stress test is concerning with significantly reduced VO2.  We generally recommend advanced heart failure therapies then VO2 max is below 12 mL/kg/min and patients were on beta-blocker therapy.  His VO2 max at present is 10.2 mL/kg/min.  Consistent with his reduced VO2, on right heart catheterization, he has severely reduced cardiac index.  On  a positive note, his biventricular filling pressures are normal.    With respect to treatment, although he feels better given his low cardiac index, significantly abnormal cardiopulmonary exercise testing, and worsening renal function I have recommended him to do the following:    #1 decrease his Coreg to 12.5 mg twice a day  #2 start hydralazine 25 mg 3 times a day for afterload reduction.  His SVR was significantly elevated at 2000.  #3 increase his Imdur due to 60 mg daily 1 week after starting hydralazine if he has no lightheadedness or dizziness  #4 decrease his Lasix to 20 mg daily given his low biventricular filling pressure and worsening creatinine  #5 given his significantly abnormal cardiopulmonary stress testing and low cardiac index, we will start workup for advanced heart failure options.  With respect to transplant, he may need heart kidney transplant, for which he is reaching the age limit for combined heart and kidney transplant.  Stage II heart transplantation followed by kidney transplantation is a potential option.  Otherwise left ventricular assist device as a bridge to transplant if his renal function had to improve or as destination therapy would be the next best best option.  His diabetes needs to be under better control.    He will return to see us in clinic 4-6 weeks.  He will call us in the interim if any further worsening symptoms.    It was a pleasure meeting Mr. Lucian Oliveira in our heart failure clinic at [facility].  We thank you for involving us in his care.  Please do not hesitate to call us in the interim of any further questions.     Sincerely,    Arvin Sheridan MD   [facility]  Section of Advanced Heart Failure   Cardiovascular Division  [facility]   [phone]          CC  Patient Care Team:  Anna Archuleta PA-C as PCP - General (Physician Assistant)  Anna Archuleta PA-C as Assigned PCP  [place]  Cassandra Olvera NP as Nurse Practitioner (Cardiology)  Diane Watts APRN CNP as Nurse Practitioner (Cardiology)  Arvin Sheridan MD as MD (Cardiology)  Yue Dowd MD as MD (INTERNAL MEDICINE - ENDOCRINOLOGY, DIABETES & METABOLISM)  Suyapa Dias, RN as Diabetes Educator (Diabetes Education)  Meenu Zuñiga, AURORA as Nurse Coordinator (Cardiology)  ARVIN SHERIDAN    Please do not hesitate to contact me if you have any questions/concerns.     Sincerely,     Arvin Sheridan MD

## 2019-03-25 NOTE — H&P
[facility]   Interventional Cardiology   History and Physical     Lucian Oliveira MRN# [id number]   YOB: 1955 Age: 63 year old       Admission Date: 3/25/2019    Chief Complaint: HFrEF, need for hemodynamic monitoring  : Utilized Staff Interpretor: Blayne    HPI: 64 yo male with PMH of ischemic cardiomyopathy, HFrEF (EF 2017 15-20%), T2DM, HLD, HTN, CKD III, CHANTEL presenting today for RHC due to CHFrEF and a question of his need for an ionotrope.    There have been no interval changes in his health since his appointment with Dr. Sheridan on 3/11.     On ASA and plavix. Not on any other blood thinners. No previous issues with internal jugular access per pt.    ROS: The patient is currently denying fever/chills, chest pain, shortness of breath, cough, nausea/vomiting/diarrhea, and swelling in the legs. All other systems were reviewed and were negative.      Consenting/Education for Cardiac Cath Lab Procedure: Right Heart Catheterization     Patient understands we would like to perform .Right Heart Catheterization due to need for further hemodynamic information. This procedure will be performed by Dr. Santos.    The patient understands the following:     Right Heart Catheterization: A fine tube (catheter) is put into the vein of the groin/neck.  It is carefully passed along until it reaches the heart and then goes up into the blood vessels of the lungs. This is done to measure a variety of pressures in your heart and can tell us how well the heart is filling and emptying, as well as monitor fluid status.     No sedation is planned for this procedure. Patient also understands risks and complications of the procedure which include, but are not limited to bruising/swelling around the incision site, infection, bleeding, allergic reaction to local anesthetic, air embolism, arterial puncture, stroke, heart attack.       Patient verbalized understanding of risks and  benefits and has elected to proceed with the procedure or procedures listed above.      Past Medical History:   Diagnosis Date     CAD (coronary artery disease)      CHF (congestive heart failure) (H)      CKD (chronic kidney disease), stage III (H)      Cortical cataract of both eyes      Diabetes (H)      Hyperlipidemia      Hypertension      Ischemic cardiomyopathy      Obesity      CHANTEL (obstructive sleep apnea)      Osteoarthritis        Past Surgical History:   Procedure Laterality Date     C CABG, ARTERY-VEIN, THREE  02/2008     IMPLANT AUTOMATIC IMPLANTABLE CARDIOVERTER DEFIBRILLATOR           No current facility-administered medications on file prior to encounter.   Current Outpatient Medications on File Prior to Encounter:  aspirin 81 MG tablet Take 1 tablet (81 mg) by mouth daily   atorvastatin (LIPITOR) 40 MG tablet Take 1 tablet (40 mg) by mouth daily   B-D U/F insulin pen needle USE 1 NEEDLE DAILY AS DIRECTED -DUE FOR APPOINTMENT FOR FURTHER REFILLS   blood glucose (ACCU-CHEK SMARTVIEW) test strip USE TO TEST THREE TIMES DAILY AS DIRECTED   blood glucose monitoring (ACCU-CHEK FELIPE SMARTVIEW) meter device kit Use to test blood sugar 3-4 times daily, as directed.   carvedilol (COREG) 25 MG tablet Take 1 tablet (25 mg) by mouth 2 times daily (with meals)   cholecalciferol (VITAMIN  -D) 1000 units capsule Take 1 capsule (1,000 Units) by mouth daily   clopidogrel (PLAVIX) 75 MG tablet TAKE ONE TABLET BY MOUTH EVERY DAY   continuous blood glucose monitoring (FREESTYLE JEREMY) sensor For use with Freestyle Jeremy Flash  for continuous monitioring of blood glucose levels. Replace sensor every 10 days.   furosemide (LASIX) 40 MG tablet Take 1 tablet (40 mg) by mouth daily - Take an extra 40mg as needed if weight goes up 2 pounds overnight, or more than 5 pounds in 1 week.   glimepiride (AMARYL) 4 MG tablet Take 1 tablet (4 mg) by mouth every morning (before breakfast)   insulin glargine (LANTUS SOLOSTAR)  100 UNIT/ML pen Take 8 units in the morning and 40 units in the evening   isosorbide mononitrate (IMDUR) 30 MG 24 hr tablet TAKE ONE AND ONE-HALF TABLETS BY MOUTH ONCE DAILY   losartan (COZAAR) 50 MG tablet Take 1 tablet (50 mg) by mouth daily   nitroglycerin (NITROSTAT) 0.4 MG SL tablet Place 1 tablet (0.4 mg) under the tongue every 5 minutes as needed for chest pain If you are still having symptoms after 3 doses (15 minutes) call 911. (Patient not taking: Reported on 10/22/2018)   order for DME Auto CPAP 8-15 cmH20   sitagliptin (JANUVIA) 50 MG tablet Take 1 tablet (50 mg) by mouth daily       Family History   Problem Relation Age of Onset     Diabetes Brother      Diabetes Sister      Diabetes Sister      Macular Degeneration No family hx of      Glaucoma No family hx of      Myocardial Infarction No family hx of      Kidney Disease No family hx of        Social History     Tobacco Use     Smoking status: Never Smoker     Smokeless tobacco: Never Used     Tobacco comment: Never smoked; non-smoking household   Substance Use Topics     Alcohol use: No     Alcohol/week: 0.0 oz       Allergies   Allergen Reactions     No Known Drug Allergies          Physical Examination:  Vitals: There were no vitals taken for this visit.  BMI= There is no height or weight on file to calculate BMI.    Constitutional: Alert and awake, well- appearing, in no significant pain or respiratory distress  ENT: Mallampati III  Skin: No erythema or signs of infection  Cardiac: RRR, no r/m/g  Pulmonary: Normal respiratory effort, good air movement, no adventitious lung sounds, no areas of decreased lung sounds  GI: +BS, NTTP    Laboratory:  CMPNo lab results found in last 7 days.  CBCNo lab results found in last 7 days.    No results found for: TROPI, TROPONIN, TROPR, TROPN      Assessment and plan:     - Proceed with RHC as planned    Maria Elena Cazares PA-C  [facility] Cardiology

## 2019-03-25 NOTE — IP AVS SNAPSHOT
[address] [address]                                    After Visit Summary   3/25/2019    Lucian Oliveira    MRN: [id number]           After Visit Summary Signature Page    I have received my discharge instructions, and my questions have been answered. I have discussed any challenges I see with this plan with the nurse or doctor.    ..........................................................................................................................................  Patient/Patient Representative Signature      ..........................................................................................................................................  Patient Representative Print Name and Relationship to Patient    ..................................................               ................................................  Date                                   Time    ..........................................................................................................................................  Reviewed by Signature/Title    ...................................................              ..............................................  Date                                               Time          22EPIC Rev 08/18

## 2019-03-25 NOTE — PATIENT INSTRUCTIONS
You were seen today in the Cardiovascular Clinic at the [facility].       Cardiology Providers you saw during your visit: Dr. Sheridan      Medication Changes:   1. DECREASE coreg (carvedilol) to 12.5mg twice per day.  2. DECREASE lasix (furosemide) to 20mg once per day.  3. INCREASE imdur (isosorbide mononitrate) to 60mg per day.  4. START hydralazine (apresoline) : take 25mg (1 tablet) three times per day.      Patient Instructions:  1. Please call us if you get dizzy or do no feel well with these medication changes.  2. Our coordinators will be in touch with you regarding further testing.    Follow up Appointment Information:  1. Return to see Dr. Sheridan on May 7th at 1230pm with labs prior      Results:    Results for BUCK ALFARO (MRN [id number]) as of 3/25/2019 16:23   Ref. Range 3/25/2019 08:40   Sodium Latest Ref Range: 133 - 144 mmol/L 136   Potassium Latest Ref Range: 3.4 - 5.3 mmol/L 4.8   Chloride Latest Ref Range: 94 - 109 mmol/L 103   Carbon Dioxide Latest Ref Range: 20 - 32 mmol/L 26   Urea Nitrogen Latest Ref Range: 7 - 30 mg/dL 38 (H)   Creatinine Latest Ref Range: 0.66 - 1.25 mg/dL 2.38 (H)   GFR Estimate Latest Ref Range: >60 mL/min/1.73_m2 27 (L)   GFR Estimate If Black Latest Ref Range: >60 mL/min/1.73_m2 32 (L)   Calcium Latest Ref Range: 8.5 - 10.1 mg/dL 9.4   Anion Gap Latest Ref Range: 3 - 14 mmol/L 7   Glucose Latest Ref Range: 70 - 99 mg/dL 106 (H)   WBC Latest Ref Range: 4.0 - 11.0 10e9/L 10.2   Hemoglobin Latest Ref Range: 13.3 - 17.7 g/dL 13.7   Hematocrit Latest Ref Range: 40.0 - 53.0 % 41.9   Platelet Count Latest Ref Range: 150 - 450 10e9/L 234   RBC Count Latest Ref Range: 4.4 - 5.9 10e12/L 4.40   MCV Latest Ref Range: 78 - 100 fl 95   MCH Latest Ref Range: 26.5 - 33.0 pg 31.1   MCHC Latest Ref Range: 31.5 - 36.5 g/dL 32.7   RDW Latest Ref Range: 10.0 - 15.0 % 13.3   INR Latest Ref Range: 0.86 - 1.14  1.04         [address] [facility] on [address]  "[place] [address]        Thank you for allowing us to be a part of your care here at the [facility]      If you have questions or concerns please contact us at:      Meenu Zuñiga RN BSN   Cardiology Care Coordinator  [facility]   Questions and [phone]   First press #1 for the University and then press #3 for \"Medical Advice\" to reach us Cardiology Nurses.        "

## 2019-03-25 NOTE — DISCHARGE INSTRUCTIONS
[facility]                        Interventional Cardiology  Discharge Instructions   Post Right Heart Cath      AFTER YOU GO HOME:    DO drink plenty of fluids    DO resume your regular diet and medications unless otherwise instructed by your Primary Physician    Do Not scrub the procedure site vigorously    No lotion or powder to the puncture site for 3 days    CALL YOUR PRIMARY PHYSICIAN IF: You may resume all normal activity.  Monitor neck site for bleeding, swelling, or voice changes. If you notice bleeding or swelling immediately apply pressure to the site and call number below to speak with Cardiology Fellow.  If you experience any changes in your breathing you should call your doctor immediately or come to the closest Emergency Department.  Do not drive yourself.    ADDITIONAL INSTRUCTIONS: Medications: You are to resume all home medications including anticoagulation therapy unless otherwise advised by your primary cardiologist or nurse coordinator.    Follow Up: Per your primary cardiology team    If you have any questions or concerns regarding your procedure site please call [phone] at anytime and ask for Cardiology Fellow on call.  They are available 24 hours a day.  You may also contact the Cardiology Clinic after hours number at [phone].                                                       Telephone Numbers [phone] Monday-Friday 8:00 am to 4:30 pm    [phone] After 4:30 pm Monday-Friday, Weekends & Holidays  Ask for Interventional Cardiologist on call. Someone is on call 24 hours/day   [facility] toll free number [phone] Monday-Friday 8:00 am to 4:30 pm   [facility] Emergency Dept [phone]

## 2019-03-25 NOTE — PROGRESS NOTES
Returned from [facility] at 1253.  Patient tolerated recovery stage well. VSS, RIJV site clean/dry/intact, no hematoma, and denies pain. Patient tolerated PO food and fluids. Teaching was done and discharge instructions were given.   Patient discharged from the hospital via ambulatory to home with his family.

## 2019-03-25 NOTE — PROGRESS NOTES
Patient prepped for RH procedure.  Denies chest pain.  Has pain upper left thigh from exercise yesterday.  Family with him.  H & P current.  Consent needed.  Labs current.

## 2019-03-26 ENCOUNTER — TELEPHONE (OUTPATIENT)
Dept: CARDIOLOGY | Facility: CLINIC | Age: 64
End: 2019-03-26

## 2019-03-26 NOTE — TELEPHONE ENCOUNTER
Called Sarah with  to discuss medication changes from yesterday. Per Dr. Sheridan, patient should start hydralazine 3 days after increased dose of imdur. Discussed with sarah and his wife. They state understanding and report no additional questions or concerns at this time.

## 2019-03-27 NOTE — PROGRESS NOTES
Cardiology Consultation      HPI:     We had the pleasure of seeing Mr. Lucian Oliveira for followup in our Advanced Heart Failure Clinic.  As you know, he is a 62-year-old gentleman with ischemic cardiomyopathy and severe LV systolic dysfunction who is currently on optimal guideline-directed medical therapy.      We saw him in clinic 2 weeks ago.  We recommended him to have repeat cardiopulmonary stress testing and right heart catheterization, he returns today for follow-up after completing these testing.    He exercised for 7 minutes and 42 seconds on his cardiopulmonary stress testing.  He achieved anaerobic threshold with an RER of 1.19.  His VO2 max was significantly reduced at 10.2 mL/kg/min.  His VE VCO2 slope was elevated at 36.6.  His blood pressure dropped during exercise from 104/60-90 5/55.  He had normal breathing reserve.    His right heart catheterization revealed an RA pressure of 4, RV of 22/4, PA of 22/10 with a mean of 11, pulmonary capillary wedge pressure of 6, Jacy cardiac output of 3.1 with an index of 1.6, thermodilution cardiac output of 3.2 with an index of 1.7, PA saturation of 62%, PVR of 1.6 Wood units, systemic blood pressure of 123/65 with a mean of 85 mmHg.  His calculated SVR was 2025.    His repeat echocardiogram shows severely reduced left ventricular systolic function.  His estimated ejection fraction was 25%.  His left ventricle was severely dilated with a left ventricular end-diastolic diameter of 6.3 cm.  He had regional wall motion abnormalities with hypokinesis of the anteroseptal walls.  He had normal right ventricular size and function.  No valvular abnormalities.  He had no pericardial effusion.    Overall, clinically, Mr. Oliveira states that he feels stable.  He is still able to do his activities of daily living and do minimal activities including gardening.  He has not had any worsening lower extremity swelling or abdominal distention.  He has not had any  lightheadedness dizziness or syncope.  No ICD shocks.  No recent hospitalization for decompressive heart failure.    PAST MEDICAL HISTORY:  Past Medical History:   Diagnosis Date     CAD (coronary artery disease)      CHF (congestive heart failure) (H)      CKD (chronic kidney disease), stage III (H)      Cortical cataract of both eyes      Diabetes (H)      Hyperlipidemia      Hypertension      Ischemic cardiomyopathy      Obesity      CHANTEL (obstructive sleep apnea)      Osteoarthritis      Current medications:      Current Outpatient Medications   Medication Sig     aspirin 81 MG tablet Take 1 tablet (81 mg) by mouth daily     atorvastatin (LIPITOR) 40 MG tablet Take 1 tablet (40 mg) by mouth daily     B-D U/F insulin pen needle USE 1 NEEDLE DAILY AS DIRECTED -DUE FOR APPOINTMENT FOR FURTHER REFILLS     blood glucose (ACCU-CHEK SMARTVIEW) test strip USE TO TEST THREE TIMES DAILY AS DIRECTED     blood glucose monitoring (ACCU-CHEK FELIPE SMARTVIEW) meter device kit Use to test blood sugar 3-4 times daily, as directed.     carvedilol (COREG) 25 MG tablet Take 0.5 tablets (12.5 mg) by mouth 2 times daily (with meals)     cholecalciferol (VITAMIN  -D) 1000 units capsule Take 1 capsule (1,000 Units) by mouth daily     clopidogrel (PLAVIX) 75 MG tablet TAKE ONE TABLET BY MOUTH EVERY DAY     continuous blood glucose monitoring (FREESTYLE JEREMY) sensor For use with Freestyle Jeremy Flash  for continuous monitioring of blood glucose levels. Replace sensor every 10 days.     furosemide (LASIX) 20 MG tablet Take 1 tablet (20 mg) by mouth daily     glimepiride (AMARYL) 4 MG tablet Take 1 tablet (4 mg) by mouth every morning (before breakfast)     hydrALAZINE (APRESOLINE) 25 MG tablet Take 1 tablet (25 mg) by mouth 3 times daily     insulin glargine (LANTUS SOLOSTAR) 100 UNIT/ML pen Take 8 units in the morning and 40 units in the evening     isosorbide mononitrate (IMDUR) 60 MG 24 hr tablet Take 1 tablet (60 mg) by mouth  "daily     losartan (COZAAR) 50 MG tablet Take 1 tablet (50 mg) by mouth daily     order for DME Auto CPAP 8-15 cmH20     sitagliptin (JANUVIA) 50 MG tablet Take 1 tablet (50 mg) by mouth daily     nitroglycerin (NITROSTAT) 0.4 MG SL tablet Place 1 tablet (0.4 mg) under the tongue every 5 minutes as needed for chest pain If you are still having symptoms after 3 doses (15 minutes) call 911. (Patient not taking: Reported on 3/25/2019)     No current facility-administered medications for this visit.      Facility-Administered Medications Ordered in Other Visits   Medication     sodium chloride (PF) 0.9% PF flush 10 mL     PAST SURGICAL HISTORY:  Past Surgical History:   Procedure Laterality Date     C CABG, ARTERY-VEIN, THREE  02/2008     CV RIGHT HEART CATH N/A 3/25/2019    Procedure: CV RIGHT HEART CATH;  Surgeon: Moises Santos MD;  Location:  HEART CARDIAC CATH LAB     IMPLANT AUTOMATIC IMPLANTABLE CARDIOVERTER DEFIBRILLATOR         ROS:   Constitutional: No fever, chills, or sweats. No weight gain/loss   ENT: No visual disturbance, ear ache, epistaxis, sore throat  Allergies/Immunologic: Negative.   Respiratory: No cough, hemoptysia  Cardiovascular: As per HPI  GI: No nausea, vomiting, hematemesis, melena, or hematochezia  : No urinary frequency, dysuria, or hematuria  Integument: Negative  Psychiatric: Negative  Neuro: Negative  Endocrinology: Negative   Musculoskeletal: Negative    EXAM:  /75 (BP Location: Left arm, Patient Position: Chair, Cuff Size: Adult Large)   Pulse 83   Ht 1.626 m (5' 4\")   Wt 88.9 kg (196 lb)   SpO2 97%   BMI 33.64 kg/m    He was comfortable.  He was in no apparent distress.  His neck exam revealed no jugular venous distention.  His carotids were 1+ bilaterally.  He had no pallor cyanosis or jaundice.  His cardiac auscultation revealed normal S1 and S2 with no murmur rub or gallop.  Auscultation of his lungs reveal equal air entry on both sides with no added sounds.  His " abdomen was soft with normal bowels and no tenderness no rigidity no guarding.  He had no focal neurological deficit.  His extremities showed no edema.  His extremities are warm and well perfused.    Labs:    CBC RESULTS:  Lab Results   Component Value Date    WBC 10.2 03/25/2019    RBC 4.40 03/25/2019    HGB 13.7 03/25/2019    HCT 41.9 03/25/2019    MCV 95 03/25/2019    MCH 31.1 03/25/2019    MCHC 32.7 03/25/2019    RDW 13.3 03/25/2019     03/25/2019       BMP RESULTS:  Lab Results   Component Value Date     03/25/2019    POTASSIUM 4.8 03/25/2019    CHLORIDE 103 03/25/2019    CO2 26 03/25/2019    ANIONGAP 7 03/25/2019     (H) 03/25/2019    BUN 38 (H) 03/25/2019    CR 2.38 (H) 03/25/2019    GFRESTIMATED 27 (L) 03/25/2019    GFRESTBLACK 32 (L) 03/25/2019    BRYANNA 9.4 03/25/2019        A1C RESULTS:  Lab Results   Component Value Date    A1C 8.5 (H) 03/11/2019       Assessment and Plan:     Mr. Lucian Oliveira is a 63-year-old with     1. Ischemic cardiomyopathy and severe LV systolic dysfunction who is currently on optimal guideline-directed medical therapy  2. Poorly controlled type 2 diabetes mellitus  3. Obesity  4.  Chronic kidney disease    He states that he feels reasonably well and has not been very symptomatic.  However, his cardiopulmonary stress test is concerning with significantly reduced VO2.  We generally recommend advanced heart failure therapies then VO2 max is below 12 mL/kg/min and patients were on beta-blocker therapy.  His VO2 max at present is 10.2 mL/kg/min.  Consistent with his reduced VO2, on right heart catheterization, he has severely reduced cardiac index.  On a positive note, his biventricular filling pressures are normal.    With respect to treatment, although he feels better given his low cardiac index, significantly abnormal cardiopulmonary exercise testing, and worsening renal function I have recommended him to do the following:    #1 decrease his Coreg to 12.5 mg twice  a day  #2 start hydralazine 25 mg 3 times a day for afterload reduction.  His SVR was significantly elevated at 2000.  #3 increase his Imdur due to 60 mg daily 1 week after starting hydralazine if he has no lightheadedness or dizziness  #4 decrease his Lasix to 20 mg daily given his low biventricular filling pressure and worsening creatinine  #5 given his significantly abnormal cardiopulmonary stress testing and low cardiac index, we will start workup for advanced heart failure options.  With respect to transplant, he may need heart kidney transplant, for which he is reaching the age limit for combined heart and kidney transplant.  Stage II heart transplantation followed by kidney transplantation is a potential option.  Otherwise left ventricular assist device as a bridge to transplant if his renal function had to improve or as destination therapy would be the next best best option.  His diabetes needs to be under better control.    He will return to see us in clinic 4-6 weeks.  He will call us in the interim if any further worsening symptoms.    It was a pleasure meeting Mr. Lucian Oliveira in our heart failure clinic at [facility].  We thank you for involving us in his care.  Please do not hesitate to call us in the interim of any further questions.     Sincerely,    Arvin Sheridan MD   [facility]  Section of Advanced Heart Failure   Cardiovascular Division  [facility]   [phone]          CC  Patient Care Team:  Anna Archuleta PA-C as PCP - General (Physician Assistant)  Anna Archuleta PA-C as Assigned PCP  Cassandra Mcgovern NP as Nurse Practitioner (Cardiology)  Diane Watts APRN CNP as Nurse Practitioner (Cardiology)  Arvin Sheridan MD as MD (Cardiology)  Yue Dowd MD as MD (INTERNAL MEDICINE - ENDOCRINOLOGY, DIABETES & METABOLISM)  Suyapa Dias, RN as Diabetes Educator (Diabetes Education)  Meenu Zuñiga, AURORA  as Nurse Coordinator (Cardiology)  SHONDA MERCHANT

## 2019-03-28 ENCOUNTER — DOCUMENTATION ONLY (OUTPATIENT)
Dept: TRANSPLANT | Facility: CLINIC | Age: 64
End: 2019-03-28

## 2019-04-17 ENCOUNTER — TELEPHONE (OUTPATIENT)
Dept: CARE COORDINATION | Facility: CLINIC | Age: 64
End: 2019-04-17

## 2019-04-17 NOTE — TELEPHONE ENCOUNTER
Transplant/LVAD Social Work assistance requested to assist pt with access to a secondary insurance policy. Writer contacted pt via phone using a . Pt currently only has Medicare and would need a supplement to proceed with evaluation for heart transplant consideration. Writer spoke to pt and his wife, whom report they spoke to [facility] yesterday and were informed that they are not eligible to enroll in a private secondary insurance policy,  as it is outside of the enrollment period and they do not have qualifying life event that would allow pt to enroll now. Pt and wife also went to the [facility] and were informed that pt would qualify for MA with a $400/mo spend down. Pt and wife appear to be agreeable to this spend down. Writer also did provide pt and wife with resource to [employer] as this organization can help pt and wife navigate through the system and they have culturally appropriate navigators.     Pt and wife agreeable to writer contacting them on Monday to check-in on progress.

## 2019-04-22 ENCOUNTER — TELEPHONE (OUTPATIENT)
Dept: CARE COORDINATION | Facility: CLINIC | Age: 64
End: 2019-04-22

## 2019-04-22 NOTE — TELEPHONE ENCOUNTER
Transplant/LVAD  f/u call to pt and his wife regarding supplemental health insurance. Writer used [employer] phone . Pt currently only has Medicare and no secondary policy and needs a secondary policy to proceed with transplant evaluation. Pt's wife did f/u with [employer] last week and was given the same information that pt only qualifies for MA with a $400 spend down. At this time, pt does not qualify to buy insurance as it is not currently open enrollment. Pt and wife have the MA application and pt's dtr is going to help them with the application. Discussed that at this time, it makes sense for pt to apply for MA w/ a spend down and then look into privately buying insurance during the open enrollment period, which starts in October. Pt and wife are aware that the [employer] line would assist them in choosing the best insurance for their situation. Pt and wife will proceed with completing the MA application, with assistance from their dtr, and sending it in to their county. Encouraged them to do this this week as it can take some time to be processed at the [facility].     Pt's wife is requesting another check-in phone call next Monday, from writer. Writer to contact pt and wife next Monday to check on the status of their application.

## 2019-05-02 DIAGNOSIS — I50.22 CHRONIC SYSTOLIC HEART FAILURE (H): Primary | Chronic | ICD-10-CM

## 2019-05-07 ENCOUNTER — OFFICE VISIT (OUTPATIENT)
Dept: ENDOCRINOLOGY | Facility: CLINIC | Age: 64
End: 2019-05-07
Payer: COMMERCIAL

## 2019-05-07 ENCOUNTER — HOSPITAL ENCOUNTER (OUTPATIENT)
Facility: CLINIC | Age: 64
End: 2019-05-07
Attending: INTERNAL MEDICINE | Admitting: INTERNAL MEDICINE
Payer: COMMERCIAL

## 2019-05-07 ENCOUNTER — OFFICE VISIT (OUTPATIENT)
Dept: CARDIOLOGY | Facility: CLINIC | Age: 64
End: 2019-05-07
Attending: INTERNAL MEDICINE
Payer: COMMERCIAL

## 2019-05-07 VITALS
BODY MASS INDEX: 35.2 KG/M2 | HEIGHT: 64 IN | WEIGHT: 206.2 LBS | HEART RATE: 78 BPM | DIASTOLIC BLOOD PRESSURE: 73 MMHG | SYSTOLIC BLOOD PRESSURE: 125 MMHG

## 2019-05-07 VITALS
HEART RATE: 68 BPM | DIASTOLIC BLOOD PRESSURE: 73 MMHG | HEIGHT: 64 IN | BODY MASS INDEX: 35.2 KG/M2 | WEIGHT: 206.2 LBS | SYSTOLIC BLOOD PRESSURE: 125 MMHG | OXYGEN SATURATION: 99 %

## 2019-05-07 DIAGNOSIS — N18.30 CHRONIC KIDNEY DISEASE, STAGE 3 (MODERATE): ICD-10-CM

## 2019-05-07 DIAGNOSIS — I50.22 CHRONIC SYSTOLIC HEART FAILURE (H): Primary | Chronic | ICD-10-CM

## 2019-05-07 DIAGNOSIS — I50.22 CHRONIC SYSTOLIC HEART FAILURE (H): ICD-10-CM

## 2019-05-07 DIAGNOSIS — F40.298 NEEDLE PHOBIA: ICD-10-CM

## 2019-05-07 DIAGNOSIS — I50.22 CHRONIC SYSTOLIC HEART FAILURE (H): Chronic | ICD-10-CM

## 2019-05-07 DIAGNOSIS — Z79.4 TYPE 2 DIABETES MELLITUS WITH DIABETIC NEPHROPATHY, WITH LONG-TERM CURRENT USE OF INSULIN (H): Primary | ICD-10-CM

## 2019-05-07 DIAGNOSIS — E66.01 MORBID OBESITY (H): ICD-10-CM

## 2019-05-07 DIAGNOSIS — E11.21 TYPE 2 DIABETES MELLITUS WITH DIABETIC NEPHROPATHY, WITH LONG-TERM CURRENT USE OF INSULIN (H): Primary | ICD-10-CM

## 2019-05-07 DIAGNOSIS — R60.0 LOCALIZED EDEMA: ICD-10-CM

## 2019-05-07 LAB
ANION GAP SERPL CALCULATED.3IONS-SCNC: 5 MMOL/L (ref 3–14)
BUN SERPL-MCNC: 26 MG/DL (ref 7–30)
CALCIUM SERPL-MCNC: 8.9 MG/DL (ref 8.5–10.1)
CHLORIDE SERPL-SCNC: 103 MMOL/L (ref 94–109)
CO2 SERPL-SCNC: 28 MMOL/L (ref 20–32)
CREAT SERPL-MCNC: 1.84 MG/DL (ref 0.66–1.25)
ERYTHROCYTE [DISTWIDTH] IN BLOOD BY AUTOMATED COUNT: 14.5 % (ref 10–15)
GFR SERPL CREATININE-BSD FRML MDRD: 37 ML/MIN/{1.73_M2}
GLUCOSE SERPL-MCNC: 142 MG/DL (ref 70–99)
HCT VFR BLD AUTO: 40.6 % (ref 40–53)
HGB BLD-MCNC: 13.2 G/DL (ref 13.3–17.7)
MCH RBC QN AUTO: 31.3 PG (ref 26.5–33)
MCHC RBC AUTO-ENTMCNC: 32.5 G/DL (ref 31.5–36.5)
MCV RBC AUTO: 96 FL (ref 78–100)
NT-PROBNP SERPL-MCNC: 1577 PG/ML (ref 0–125)
PLATELET # BLD AUTO: 201 10E9/L (ref 150–450)
POTASSIUM SERPL-SCNC: 4.4 MMOL/L (ref 3.4–5.3)
RBC # BLD AUTO: 4.22 10E12/L (ref 4.4–5.9)
SODIUM SERPL-SCNC: 136 MMOL/L (ref 133–144)
WBC # BLD AUTO: 8.2 10E9/L (ref 4–11)

## 2019-05-07 PROCEDURE — 36415 COLL VENOUS BLD VENIPUNCTURE: CPT | Performed by: INTERNAL MEDICINE

## 2019-05-07 PROCEDURE — G0463 HOSPITAL OUTPT CLINIC VISIT: HCPCS | Mod: ZF

## 2019-05-07 PROCEDURE — 85027 COMPLETE CBC AUTOMATED: CPT | Performed by: INTERNAL MEDICINE

## 2019-05-07 PROCEDURE — 83880 ASSAY OF NATRIURETIC PEPTIDE: CPT | Performed by: INTERNAL MEDICINE

## 2019-05-07 PROCEDURE — 99215 OFFICE O/P EST HI 40 MIN: CPT | Mod: ZP | Performed by: INTERNAL MEDICINE

## 2019-05-07 PROCEDURE — 80048 BASIC METABOLIC PNL TOTAL CA: CPT | Performed by: INTERNAL MEDICINE

## 2019-05-07 PROCEDURE — T1013 SIGN LANG/ORAL INTERPRETER: HCPCS | Mod: U3,ZF

## 2019-05-07 RX ORDER — SPIRONOLACTONE 25 MG/1
25 TABLET ORAL DAILY
COMMUNITY
End: 2019-05-07

## 2019-05-07 RX ORDER — LIDOCAINE 40 MG/G
CREAM TOPICAL
Status: CANCELLED | OUTPATIENT
Start: 2019-05-07

## 2019-05-07 ASSESSMENT — PAIN SCALES - GENERAL
PAINLEVEL: NO PAIN (0)
PAINLEVEL: NO PAIN (0)

## 2019-05-07 ASSESSMENT — MIFFLIN-ST. JEOR
SCORE: 1631.32
SCORE: 1631.32

## 2019-05-07 NOTE — LETTER
5/7/2019       RE: Lucian Oliveira  [address] [address]     Dear Colleague,    Thank you for referring your patient, Lucian Oliveira, to the [facility] ENDOCRINOLOGY at [facility]. Please see a copy of my visit note below.    [facility]  Endocrinology  Marisabel Priteo PA-C  05/07/2019      Chief Complaint:   Diabetes    History of Present Illness:   Lucian Oliveira is a 63 year old male with a history of CAD, type II diabetes mellitus, hypertension, CKD stage 3 and CHANTEL who presents for evaluation of diabetes. He is an immigrant from [place] in 1971. Diagnosed DM2 at age 45, diagnosed by PMD by regular check up. Prescribed oral meds but not much taking and only with diet exercise. Insulin use was started over 1 year ago, Lantus was started with 15 units and now 40 units insulin at night. Used to take Metformin and was discontinued due to GFR.     His A1c was 8.5 on 3/11/19. He is doing well. In general he is poorly controlled. He is having fasting readings 78-80 in the morning, 120 around noon. He checks twice per day. It has not been below 70. He denies overnight shaking, sweating, or hunger. The lowest he has seen at noon is 148-170. The highest he sees at noon is 190. He is giving Lantus 40 unit(s) at night and 8 unit(s) in the morning. He denies missing doses of insulin or pills. He is taking 50 unit(s) Sitagliptin, Amaryl 4mg in the morning. He has not done meal time insulin dosing in the past.     His left foot has been more swollen for the last 3 days. It is somewhat painful. He is on Furosemide. He is taking extra doses of diuretics per cardiology with persistent swelling. Normally the swelling is more equal, however the last 2-3 days his left foot is significantly more swollen than the right. He is on Plavix and Aspirin, but no other anti coagulant.  He is scheduled to see Cardiology at 12:15.    His throat is irritated and he has had a cough for one  week. He does not think it is a cold and it feels more like a bronchitis to him.     Both him and his wife are not working at the moment. He is retired, but is having a hard tie paying his bills and would like to be working again.       He also has back pain from working year ago, sometimes he has back pain and wondered if leg pain and swelling could be related.     Diabetes monitoring and complications:  CAD: Yes  Last eye exam results: 09/18/2018  Last dental exam: not discussed today.  Microalbuminuria:  3.74 October 2018  HTN: Yes  On Statin: Yes  On Aspirin: Yes  Depression: No    Review of Systems:   Pertinent items are noted in HPI.  All other systems are negative.    Active Medications:     Current Outpatient Medications:      aspirin 81 MG tablet, Take 1 tablet (81 mg) by mouth daily, Disp: 30 tablet, Rfl: 11     atorvastatin (LIPITOR) 40 MG tablet, Take 1 tablet (40 mg) by mouth daily, Disp: 90 tablet, Rfl: 1     B-D U/F insulin pen needle, USE 1 NEEDLE DAILY AS DIRECTED -DUE FOR APPOINTMENT FOR FURTHER REFILLS, Disp: 100 each, Rfl: 0     blood glucose (ACCU-CHEK SMARTVIEW) test strip, USE TO TEST THREE TIMES DAILY AS DIRECTED, Disp: 100 strip, Rfl: 0     blood glucose monitoring (ACCU-CHEK FELIPE SMARTVIEW) meter device kit, Use to test blood sugar 3-4 times daily, as directed., Disp: 1 kit, Rfl: 0     carvedilol (COREG) 25 MG tablet, Take 0.5 tablets (12.5 mg) by mouth 2 times daily (with meals), Disp: 180 tablet, Rfl: 1     cholecalciferol (VITAMIN  -D) 1000 units capsule, Take 1 capsule (1,000 Units) by mouth daily, Disp: 30 capsule, Rfl:      clopidogrel (PLAVIX) 75 MG tablet, TAKE ONE TABLET BY MOUTH EVERY DAY, Disp: 90 tablet, Rfl: 1     continuous blood glucose monitoring (FREESTYLE JEREMY) sensor, For use with Freestyle Jeremy Flash  for continuous monitioring of blood glucose levels. Replace sensor every 10 days., Disp: 9 each, Rfl: 3     furosemide (LASIX) 20 MG tablet, Take 1 tablet (20 mg)  by mouth daily, Disp: 90 tablet, Rfl: 1     glimepiride (AMARYL) 4 MG tablet, Take 1 tablet (4 mg) by mouth every morning (before breakfast), Disp: 180 tablet, Rfl: 3     hydrALAZINE (APRESOLINE) 25 MG tablet, Take 1 tablet (25 mg) by mouth 3 times daily, Disp: 270 tablet, Rfl: 1     insulin glargine (LANTUS SOLOSTAR) 100 UNIT/ML pen, Take 8 units in the morning and 40 units in the evening, Disp: 45 mL, Rfl: 3     isosorbide mononitrate (IMDUR) 60 MG 24 hr tablet, Take 1 tablet (60 mg) by mouth daily, Disp: 90 tablet, Rfl: 1     losartan (COZAAR) 50 MG tablet, Take 1 tablet (50 mg) by mouth daily, Disp: 90 tablet, Rfl: 3     nitroglycerin (NITROSTAT) 0.4 MG SL tablet, Place 1 tablet (0.4 mg) under the tongue every 5 minutes as needed for chest pain If you are still having symptoms after 3 doses (15 minutes) call 911., Disp: 10 tablet, Rfl: 0     order for DME, Auto CPAP 8-15 cmH20, Disp: 1 Units, Rfl: 0     sitagliptin (JANUVIA) 50 MG tablet, Take 1 tablet (50 mg) by mouth daily, Disp: 90 tablet, Rfl: 5     spironolactone (ALDACTONE) 25 MG tablet, Take 25 mg by mouth daily, Disp: , Rfl:   No current facility-administered medications for this visit.     Facility-Administered Medications Ordered in Other Visits:      sodium chloride (PF) 0.9% PF flush 10 mL, 10 mL, Intracatheter, Once, Omid Boateng      Allergies:   No known drug allergies      Past Medical History:  Coronary artery disease  Congestive heart failure  Chronic kidney disease  Cortical cataract of both eyes  Diabetes mellitus  Hyperlipidemia  Hypertension  Ischemic cardiomyopathy  Obstructive sleep apnea  Osteoarthritis  Congestive heart failure class II  Chronic kidney disease stage 3  Vitamin D deficiency  Tinnitus  Secondary renal hyperparathyroidism  Obstructive sleep apnea   non proliferative diabetic retinopathy      Past Surgical History:  CABG  Right heart cath  Implant automatic cardioverter    Family History:   Brother: diabetes  "mellitus  Sister: diabetes mellitus      Social History:     Non smoker     Physical Exam:   /73   Pulse 78   Ht 1.626 m (5' 4\")   Wt 93.5 kg (206 lb 3.2 oz)   BMI 35.39 kg/m        Wt Readings from Last 10 Encounters:   05/07/19 93.5 kg (206 lb 3.2 oz)   03/25/19 88.9 kg (196 lb)   03/25/19 89.4 kg (197 lb)   03/11/19 90.5 kg (199 lb 9.6 oz)   11/01/18 89.4 kg (197 lb 1.5 oz)   10/22/18 89.4 kg (197 lb)   10/16/18 90.5 kg (199 lb 9.6 oz)   10/03/18 89.8 kg (198 lb)   08/16/18 90.3 kg (199 lb)   08/06/18 91.5 kg (201 lb 11.2 oz)        General: Pleasant, well nourished and hydrated male in NAD.   Psych:  Mood is \"good,\" affect is appropriate.  Thought form and content are fluid and coherent.    HEENT: Eyes and sclera are clear. Extraocular movements are grossly intact without proptosis.  Nares are patent, mucous membranes moist.  Neck: No masses or JVD are noted.    Resp: Easy and unlabored breathing. No rales or rhonchi.   Neuro: Alert and oriented, communicating clearly.   Ext: no swelling or edema  Card: RR, no clicks or rubs, but slight gallop present. 4+ pitting edema of right lower extremity. Less pitting on left leg but significantly more  edema of the left foot compared to the right. Negative Girish's. Pulses 2/4 on right side, pulses not palpable on left.  Color is mildly darker on left side.  Both feet are warm.  There some peeling of skin, no maceration, space between toes intact and without lesions.       Data:  Lab Results   Component Value Date     05/07/2019    POTASSIUM 4.4 05/07/2019    CHLORIDE 103 05/07/2019    CO2 28 05/07/2019    ANIONGAP 5 05/07/2019     (H) 05/07/2019    BUN 26 05/07/2019    CR 1.84 (H) 05/07/2019    BRYANNA 8.9 05/07/2019     Lab Results   Component Value Date    GFRESTIMATED 37 (L) 05/07/2019    GFRESTIMATED 27 (L) 03/25/2019    GFRESTIMATED 31 (L) 03/11/2019    GFRESTBLACK 43 (L) 05/07/2019    GFRESTBLACK 32 (L) 03/25/2019    GFRESTBLACK 36 (L) " 03/11/2019      Lab Results   Component Value Date    MICROL 6 10/03/2018    UMALCR 3.74 10/03/2018        Lab Results   Component Value Date    A1C 8.5 (H) 03/11/2019    A1C 7.6 (H) 10/16/2018    A1C 9.8 (H) 09/06/2017    A1C 9.1 (H) 07/05/2017    A1C 10.5 (H) 04/27/2017    HEMOGLOBINA1 8.3 (A) 08/06/2018    HEMOGLOBINA1 10.6 (A) 04/30/2018     Lab Results   Component Value Date    CHOL 91 08/16/2018    CHOL 93 07/05/2017    TRIG 297 (H) 08/16/2018    TRIG 226 (H) 07/05/2017    HDL 25 (L) 08/16/2018    HDL 26 (L) 07/05/2017    LDL 7 08/16/2018    LDL 22 07/05/2017    NHDL 66 08/16/2018    NHDL 67 07/05/2017       Assessment and Plan:   Type 2 diabetes mellitus with diabetic nephropathy, with long-term current use of insulin (H)  Asked him to check prior to bed. He is having lows in the morning and is likely high during the day with meals. His usually gives Lantus in the evening. For now we will change his dosing to be more in the morning, he agreed to try changing to 40 unit(s) in the morning with 8 unit(s) in the evening to hopefully prevent overnight lows. At this time we will not start meal time insulin. since he already gave Lantus 8 unit(s) this morning will have him give 8 tonight, and starting 40 unit(s) tomorrow morning. We discussed a target range of  fasting and the need to adjust if below 90 consistently.     Chronic kidney disease, stage 3 (moderate) (H)  Limits medications,  SGLT2i  desirable from cardiac standpoint, but not advised with GFR <30.  GLP-1 Liraglutide, Dulagltuide could be considered.     Localized edema  He has bilateral lower extremity edema with left pedal edema worse than right. Normally his swelling is equal usually so this is different. As  he is seeing cardiology later today I asked that he be evaluated for thrombus they deem it necessary.  I am happy to help with this if needed.  Patient will discuss with them and AURORA Munson relayed concerns to Dr. Sheridan's nurse.       Follow-up: Return in about 3 months (around 8/7/2019).     >50% of 30 minute visit spent in face to face counseling, education and coordination of care related to options for better glycemic control as well as preventing, detecting, and treating hypoglycemia.      It is my privilege to be involved in the care of the above patient.     Marisabel Prieto PA-C, [facility]  [facility]  Diabetes, Endocrinology, and Metabolism  [phone] Appointments/Nurse  [phone] Fax  [phone] pager  [phone] nurse line    Scribe Disclosure:  I, Jhonathan Smith, am serving as a scribe to document services personally performed by Marisabel Prieto PA-C at this visit, based upon the provider's statements to me. All documentation has been reviewed by the aforementioned provider prior to being entered into the official medical record.     Portions of this medical record were completed by a scribe. UPON MY REVIEW AND AUTHENTICATION BY ELECTRONIC SIGNATURE, this confirms (a) I performed the applicable clinical services, and (b) the record is accurate.

## 2019-05-07 NOTE — PROGRESS NOTES
[facility]  Endocrinology  Marisabel Prieto PA-C  05/07/2019      Chief Complaint:   Diabetes    History of Present Illness:   Lucian Oliveira is a 63 year old male with a history of CAD, type II diabetes mellitus, hypertension, CKD stage 3 and CHANTEL who presents for evaluation of diabetes. He is an immigrant from [place] in 1971. Diagnosed DM2 at age 45, diagnosed by PMD by regular check up. Prescribed oral meds but not much taking and only with diet exercise. Insulin use was started over 1 year ago, Lantus was started with 15 units and now 40 units insulin at night. Used to take Metformin and was discontinued due to GFR.     His A1c was 8.5 on 3/11/19. He is doing well. In general he is poorly controlled. He is having fasting readings 78-80 in the morning, 120 around noon. He checks twice per day. It has not been below 70. He denies overnight shaking, sweating, or hunger. The lowest he has seen at noon is 148-170. The highest he sees at noon is 190. He is giving Lantus 40 unit(s) at night and 8 unit(s) in the morning. He denies missing doses of insulin or pills. He is taking 50 unit(s) Sitagliptin, Amaryl 4mg in the morning. He has not done meal time insulin dosing in the past.     His left foot has been more swollen for the last 3 days. It is somewhat painful. He is on Furosemide. He is taking extra doses of diuretics per cardiology with persistent swelling. Normally the swelling is more equal, however the last 2-3 days his left foot is significantly more swollen than the right. He is on Plavix and Aspirin, but no other anti coagulant.  He is scheduled to see Cardiology at 12:15.    His throat is irritated and he has had a cough for one week. He does not think it is a cold and it feels more like a bronchitis to him.     Both him and his wife are not working at the moment. He is retired, but is having a hard tie paying his bills and would like to be working again.       He also has back pain from working year ago,  sometimes he has back pain and wondered if leg pain and swelling could be related.     Diabetes monitoring and complications:  CAD: Yes  Last eye exam results: 09/18/2018  Last dental exam: not discussed today.  Microalbuminuria: 3.74 October 2018  HTN: Yes  On Statin: Yes  On Aspirin: Yes  Depression: No    Review of Systems:   Pertinent items are noted in HPI.  All other systems are negative.    Active Medications:     Current Outpatient Medications:      aspirin 81 MG tablet, Take 1 tablet (81 mg) by mouth daily, Disp: 30 tablet, Rfl: 11     atorvastatin (LIPITOR) 40 MG tablet, Take 1 tablet (40 mg) by mouth daily, Disp: 90 tablet, Rfl: 1     B-D U/F insulin pen needle, USE 1 NEEDLE DAILY AS DIRECTED -DUE FOR APPOINTMENT FOR FURTHER REFILLS, Disp: 100 each, Rfl: 0     blood glucose (ACCU-CHEK SMARTVIEW) test strip, USE TO TEST THREE TIMES DAILY AS DIRECTED, Disp: 100 strip, Rfl: 0     blood glucose monitoring (ACCU-CHEK FELIPE SMARTVIEW) meter device kit, Use to test blood sugar 3-4 times daily, as directed., Disp: 1 kit, Rfl: 0     carvedilol (COREG) 25 MG tablet, Take 0.5 tablets (12.5 mg) by mouth 2 times daily (with meals), Disp: 180 tablet, Rfl: 1     cholecalciferol (VITAMIN  -D) 1000 units capsule, Take 1 capsule (1,000 Units) by mouth daily, Disp: 30 capsule, Rfl:      clopidogrel (PLAVIX) 75 MG tablet, TAKE ONE TABLET BY MOUTH EVERY DAY, Disp: 90 tablet, Rfl: 1     continuous blood glucose monitoring (FREESTYLE JEREMY) sensor, For use with Freestyle Jeremy Flash  for continuous monitioring of blood glucose levels. Replace sensor every 10 days., Disp: 9 each, Rfl: 3     furosemide (LASIX) 20 MG tablet, Take 1 tablet (20 mg) by mouth daily, Disp: 90 tablet, Rfl: 1     glimepiride (AMARYL) 4 MG tablet, Take 1 tablet (4 mg) by mouth every morning (before breakfast), Disp: 180 tablet, Rfl: 3     hydrALAZINE (APRESOLINE) 25 MG tablet, Take 1 tablet (25 mg) by mouth 3 times daily, Disp: 270 tablet, Rfl:  "1     insulin glargine (LANTUS SOLOSTAR) 100 UNIT/ML pen, Take 8 units in the morning and 40 units in the evening, Disp: 45 mL, Rfl: 3     isosorbide mononitrate (IMDUR) 60 MG 24 hr tablet, Take 1 tablet (60 mg) by mouth daily, Disp: 90 tablet, Rfl: 1     losartan (COZAAR) 50 MG tablet, Take 1 tablet (50 mg) by mouth daily, Disp: 90 tablet, Rfl: 3     nitroglycerin (NITROSTAT) 0.4 MG SL tablet, Place 1 tablet (0.4 mg) under the tongue every 5 minutes as needed for chest pain If you are still having symptoms after 3 doses (15 minutes) call 911., Disp: 10 tablet, Rfl: 0     order for DME, Auto CPAP 8-15 cmH20, Disp: 1 Units, Rfl: 0     sitagliptin (JANUVIA) 50 MG tablet, Take 1 tablet (50 mg) by mouth daily, Disp: 90 tablet, Rfl: 5     spironolactone (ALDACTONE) 25 MG tablet, Take 25 mg by mouth daily, Disp: , Rfl:   No current facility-administered medications for this visit.     Facility-Administered Medications Ordered in Other Visits:      sodium chloride (PF) 0.9% PF flush 10 mL, 10 mL, Intracatheter, Once, Omid Boateng      Allergies:   No known drug allergies      Past Medical History:  Coronary artery disease  Congestive heart failure  Chronic kidney disease  Cortical cataract of both eyes  Diabetes mellitus  Hyperlipidemia  Hypertension  Ischemic cardiomyopathy  Obstructive sleep apnea  Osteoarthritis  Congestive heart failure class II  Chronic kidney disease stage 3  Vitamin D deficiency  Tinnitus  Secondary renal hyperparathyroidism  Obstructive sleep apnea   non proliferative diabetic retinopathy      Past Surgical History:  CABG  Right heart cath  Implant automatic cardioverter    Family History:   Brother: diabetes mellitus  Sister: diabetes mellitus      Social History:     Non smoker     Physical Exam:   /73   Pulse 78   Ht 1.626 m (5' 4\")   Wt 93.5 kg (206 lb 3.2 oz)   BMI 35.39 kg/m       Wt Readings from Last 10 Encounters:   05/07/19 93.5 kg (206 lb 3.2 oz)   03/25/19 88.9 " "kg (196 lb)   03/25/19 89.4 kg (197 lb)   03/11/19 90.5 kg (199 lb 9.6 oz)   11/01/18 89.4 kg (197 lb 1.5 oz)   10/22/18 89.4 kg (197 lb)   10/16/18 90.5 kg (199 lb 9.6 oz)   10/03/18 89.8 kg (198 lb)   08/16/18 90.3 kg (199 lb)   08/06/18 91.5 kg (201 lb 11.2 oz)        General: Pleasant, well nourished and hydrated male in NAD.   Psych:  Mood is \"good,\" affect is appropriate.  Thought form and content are fluid and coherent.    HEENT: Eyes and sclera are clear. Extraocular movements are grossly intact without proptosis.  Nares are patent, mucous membranes moist.  Neck: No masses or JVD are noted.    Resp: Easy and unlabored breathing. No rales or rhonchi.   Neuro: Alert and oriented, communicating clearly.   Ext: no swelling or edema  Card: RR, no clicks or rubs, but slight gallop present. 4+ pitting edema of right lower extremity. Less pitting on left leg but significantly more  edema of the left foot compared to the right. Negative Girish's. Pulses 2/4 on right side, pulses not palpable on left.  Color is mildly darker on left side.  Both feet are warm.  There some peeling of skin, no maceration, space between toes intact and without lesions.       Data:  Lab Results   Component Value Date     05/07/2019    POTASSIUM 4.4 05/07/2019    CHLORIDE 103 05/07/2019    CO2 28 05/07/2019    ANIONGAP 5 05/07/2019     (H) 05/07/2019    BUN 26 05/07/2019    CR 1.84 (H) 05/07/2019    BRYANNA 8.9 05/07/2019     Lab Results   Component Value Date    GFRESTIMATED 37 (L) 05/07/2019    GFRESTIMATED 27 (L) 03/25/2019    GFRESTIMATED 31 (L) 03/11/2019    GFRESTBLACK 43 (L) 05/07/2019    GFRESTBLACK 32 (L) 03/25/2019    GFRESTBLACK 36 (L) 03/11/2019      Lab Results   Component Value Date    MICROL 6 10/03/2018    UMALCR 3.74 10/03/2018        Lab Results   Component Value Date    A1C 8.5 (H) 03/11/2019    A1C 7.6 (H) 10/16/2018    A1C 9.8 (H) 09/06/2017    A1C 9.1 (H) 07/05/2017    A1C 10.5 (H) 04/27/2017    HEMOGLOBINA1 " 8.3 (A) 08/06/2018    HEMOGLOBINA1 10.6 (A) 04/30/2018     Lab Results   Component Value Date    CHOL 91 08/16/2018    CHOL 93 07/05/2017    TRIG 297 (H) 08/16/2018    TRIG 226 (H) 07/05/2017    HDL 25 (L) 08/16/2018    HDL 26 (L) 07/05/2017    LDL 7 08/16/2018    LDL 22 07/05/2017    NHDL 66 08/16/2018    NHDL 67 07/05/2017       Assessment and Plan:   Type 2 diabetes mellitus with diabetic nephropathy, with long-term current use of insulin (H)  Asked him to check prior to bed. He is having lows in the morning and is likely high during the day with meals. His usually gives Lantus in the evening. For now we will change his dosing to be more in the morning, he agreed to try changing to 40 unit(s) in the morning with 8 unit(s) in the evening to hopefully prevent overnight lows. At this time we will not start meal time insulin. since he already gave Lantus 8 unit(s) this morning will have him give 8 tonight, and starting 40 unit(s) tomorrow morning. We discussed a target range of  fasting and the need to adjust if below 90 consistently.     Chronic kidney disease, stage 3 (moderate) (H)  Limits medications,  SGLT2i  desirable from cardiac standpoint, but not advised with GFR <30.  GLP-1 Liraglutide, Dulagltuide could be considered.     Localized edema  He has bilateral lower extremity edema with left pedal edema worse than right. Normally his swelling is equal usually so this is different. As  he is seeing cardiology later today I asked that he be evaluated for thrombus they deem it necessary.  I am happy to help with this if needed.  Patient will discuss with them and AURORA Munson relayed concerns to Dr. Sheridan's nurse.      Follow-up: Return in about 3 months (around 8/7/2019).     >50% of 30 minute visit spent in face to face counseling, education and coordination of care related to options for better glycemic control as well as preventing, detecting, and treating hypoglycemia.      It is my privilege to be  involved in the care of the above patient.     Marisabel Prieto PA-C, RUSTS  [facility]  Diabetes, Endocrinology, and Metabolism  [phone] Appointments/Nurse  [phone] Fax  [phone] pager  [phone] nurse line         Scribe Disclosure:  I, Jhonathan Smith, am serving as a scribe to document services personally performed by Marisabel Prieto PA-C at this visit, based upon the provider's statements to me. All documentation has been reviewed by the aforementioned provider prior to being entered into the official medical record.     Portions of this medical record were completed by a scribe. UPON MY REVIEW AND AUTHENTICATION BY ELECTRONIC SIGNATURE, this confirms (a) I performed the applicable clinical services, and (b) the record is accurate.

## 2019-05-07 NOTE — PATIENT INSTRUCTIONS
Es rogers verle.    Por ahora, cambiamos y quiero que da Lanrtus 40 unidades de maniana y solo 8 de noche.     Hoy noche da solo 8 unidades y maniana da 40 unidades de maniana, de aristides siempre 40 de maniana y 8 de noche.      Siempre llame si la glucosa sea <70.      Lllamame en shen semana con halie salen los numeros de maniana y de noche.    Mi oficina es [phone]    Mi pager es: [phone].    Que le vaya muy savannah!    My best wishes,    Marisabel Prieto PA-C, MPAS  [facility]  Diabetes, Endocrinology, and Metabolism  [phone] Appointments/Nurse  [phone] Fax  [phone] nurse line  [phone] URGENTafter hours/weekend Endocrinologist on call

## 2019-05-07 NOTE — NURSING NOTE
Diet: Patient instructed regarding a heart failure healthy diet, including discussion of reduced fat and 2000 mg daily sodium restriction, daily weights, medication purpose and compliance, fluid restrictions and resources for patient and family to access for assistance with heart failure management.       Labs: Patient was given results of the laboratory testing obtained today and patient was instructed about when to return for the next laboratory testing.     Med Reconcile: Reviewed and verified all current medications with the patient. The updated medication list was printed and given to the patient. Increase lasix to 60mg daily for 3 days. Then to 40mg daily.    Return Appointment: Patient given instructions regarding scheduling next clinic visit. [facility] with leave in [place] and scheduled admit on Monday May 13.    Patient stated he understood all health information given and agreed to call with further questions or concerns.       Meenu Zuñiga RN

## 2019-05-07 NOTE — PROGRESS NOTES
Service Date: 2019      Anna Archuleta PA-C   [address]   [place] [address]      RE: Lucian Oliveira   MRN: [id number]   : 1955      Dear Ms. Archuleta:      We had the pleasure of seeing Mr. Lucian Oliveira in our Advanced Heart Failure Clinic.  As you know, he is a 63-year-old male with ischemic cardiomyopathy and severe LV systolic dysfunction, who is currently on optimal medical therapy.      PAST MEDICAL HISTORY:   1.  Coronary artery disease, status post coronary artery bypass grafting surgery in .   2.  Ischemic cardiomyopathy with an estimated ejection fraction of 25%.   3.  Hypertension.   4.  Hyperlipidemia.   5.  Diabetes.   6.  Obesity.   7.  Obstructive sleep apnea.   8.  Chronic kidney disease.      We saw Mr. Oliveira approximately 6 weeks ago in clinic.  At that time, his exercise testing was concerning for significant cardiac limitation.  His VO2 was significantly reduced at 10.2 mL/kg/min.  His VE/VCO2 slope was elevated.  His right heart catheterization showed moderately reduced cardiac index with normal biventricular filling pressure.  In light of his reduced cardiac index, we decreased his beta blocker.  We started him on hydralazine and higher doses of isosorbide mononitrate for afterload reduction. As his filling pressures were low, we also decreased his Lasix.      Today, he returns for followup.  He states that he feels more tired than before.  He has also gained 10 pounds in the last 2 weeks.  He has worsening lower extremity edema.  However, he continues to work out 25 minutes a day at the fitness center.  He exercises 3-5 times a week.  He has not had any lightheadedness, dizziness or syncope.  He has not had any PND or orthopnea.  No ICD shocks.      I would currently characterize him as NYHA functional class IIIB.      REVIEW OF SYSTEMS:  A detailed 10-point review of systems was obtained as described in the History of Present Illness.  All  other systems reviewed and are negative.      MEDICATIONS:    Current Outpatient Medications   Medication Sig     aspirin 81 MG tablet Take 1 tablet (81 mg) by mouth daily     atorvastatin (LIPITOR) 40 MG tablet Take 1 tablet (40 mg) by mouth daily     B-D U/F insulin pen needle USE 1 NEEDLE DAILY AS DIRECTED -DUE FOR APPOINTMENT FOR FURTHER REFILLS     blood glucose (ACCU-CHEK SMARTVIEW) test strip USE TO TEST THREE TIMES DAILY AS DIRECTED     blood glucose monitoring (ACCU-CHEK FELIPE SMARTVIEW) meter device kit Use to test blood sugar 3-4 times daily, as directed.     carvedilol (COREG) 25 MG tablet Take 0.5 tablets (12.5 mg) by mouth 2 times daily (with meals)     clopidogrel (PLAVIX) 75 MG tablet TAKE ONE TABLET BY MOUTH EVERY DAY     furosemide (LASIX) 20 MG tablet Take 1 tablet (20 mg) by mouth daily     glimepiride (AMARYL) 4 MG tablet Take 1 tablet (4 mg) by mouth every morning (before breakfast)     hydrALAZINE (APRESOLINE) 25 MG tablet Take 1 tablet (25 mg) by mouth 3 times daily     insulin glargine (LANTUS SOLOSTAR) 100 UNIT/ML pen Take 8 units in the morning and 40 units in the evening     isosorbide mononitrate (IMDUR) 60 MG 24 hr tablet Take 1 tablet (60 mg) by mouth daily     losartan (COZAAR) 50 MG tablet Take 1 tablet (50 mg) by mouth daily     nitroglycerin (NITROSTAT) 0.4 MG SL tablet Place 1 tablet (0.4 mg) under the tongue every 5 minutes as needed for chest pain If you are still having symptoms after 3 doses (15 minutes) call 911.     order for DME Auto CPAP 8-15 cmH20     sitagliptin (JANUVIA) 50 MG tablet Take 1 tablet (50 mg) by mouth daily     No current facility-administered medications for this visit.      Facility-Administered Medications Ordered in Other Visits   Medication     sodium chloride (PF) 0.9% PF flush 10 mL     PHYSICAL EXAMINATION:  He was comfortable.  He was in no apparent distress.  His blood pressure was 124/73.  His pulse rate was 68.  His respiratory rate was 16.  He  was saturating 99% on room air.  He weighed 206 pounds.  He was 5 feet 4 inches tall.  His BMI was 35.4.  He had no pallor, cyanosis or jaundice.  His neck exam revealed no significant jugular venous distention.  His carotids were 1+ bilaterally.  Cardiac auscultation revealed normal S1 and S2, no murmur, rub or gallop.  Auscultation of the lungs revealed equal air entry on both sides with no added sounds.  His abdomen was soft with normal bowel sounds, no tenderness, no rigidity, no guarding.  He had no focal neurological deficit.  His extremities showed 2+ edema.      His chemistry showed a sodium of 136, potassium of 4.4, chloride of 103, bicarbonate of 28, BUN of 26, creatinine of 1.84 and a calcium of 8.8. His NT-proBNP was mildly elevated at 1577. His CBC showed a hemoglobin of 13.2, hematocrit of 40.6, WBC of 8.2 and a platelet count of 201.      ASSESSMENT AND PLAN:      Mr. Lucian Oliveira is a pleasant 63-year-old male with past medical history of hypertension, hyperlipidemia, diabetes, coronary artery disease, status post coronary artery bypass grafting surgery and ischemic cardiomyopathy, who returns today for followup.      Mr. Oliveira has been having worsening lower extremity swelling with a 10-pound weight gain.  He also has been feeling more tired than usual.  I am afraid that he is having gradual progression of his ischemic cardiomyopathy.      His creatinine today is better than before.  I suspect this is due to a reduction in his diuretic dose.  Probably his creatinine was high before as he was on a higher dose of diuretics with a relatively low cardiac index.  Given his 2 repeated right heart catheterizations showing low cardiac index and 2 cardiopulmonary exercises tests showing reduced VO2, and his renal function getting worse, I believe it would be appropriate to consider an LVAD or transplant at this point in time.      I recommended him to have an evaluation done last time; however, this has  not been done due to insurance issue.  He lacks Medicare supplement and he is waiting for his daughter to come to fill out the application.  I am afraid that if we wait too long, his renal function may get worse, given his low cardiac index.  I discussed with him the option of getting admitted to the hospital and have an expedited eval.  He is willing to have this done.  He wants to spend this Mother's Day weekend at home and then get admitted next Monday, which I think is reasonable.  We will have him have a right heart catheterization and admit him to the Intensive Care with a leave-in [place] and adjust his hemodynamics accordingly.  He may need to be started on an inotrope.  In the interim, I recommended him to take Lasix 60 mg for 3 days and then 40 mg daily.      His renal function is getting better.  If his renal function improves with inotropes or further optimization of his afterload reduction, he may be a candidate for a heart transplantation.  However, he is a blood type O.  He is on the heavier side.  He may need left ventricular device as a bridge to transplant. We will decide on the treatment options after he completes his initial workup.  We thank you for involving us in his care.  Please do not hesitate to call us in the interim if you have any further questions.      Sincerely,   Arvin Sheridan MD   [facility]  Heart Failure, Transplant, and Mechanical Circulatory Support Cardiology   Cardiovascular Division  [facility]   [phone]               D: 2019   T: 2019   MT: al      Name:     BUCK ALFARO   MRN:      [id number]        Account:      [id number]   :      1955           Service Date: 2019      Document: [id number]

## 2019-05-07 NOTE — LETTER
2019      RE: Lucian Oliveira  [address] [address]       Service Date: 2019      Anna Archuleta PA-C   [address] [place] [address]      RE: Lucian Oliveira   MRN: [id number]   : 1955      Dear Ms. Kathe:      We had the pleasure of seeing Mr. Lucian Olvieira in our Advanced Heart Failure Clinic.  As you know, he is a 63-year-old male with ischemic cardiomyopathy and severe LV systolic dysfunction, who is currently on optimal medical therapy.      PAST MEDICAL HISTORY:   1.  Coronary artery disease, status post coronary artery bypass grafting surgery in .   2.  Ischemic cardiomyopathy with an estimated ejection fraction of 25%.   3.  Hypertension.   4.  Hyperlipidemia.   5.  Diabetes.   6.  Obesity.   7.  Obstructive sleep apnea.   8.  Chronic kidney disease.      We saw Mr. Oliveira approximately 6 weeks ago in clinic.  At that time, his exercise testing was concerning for significant cardiac limitation.  His VO2 was significantly reduced at 10.2 mL/kg/min.  His VE/VCO2 slope was elevated.  His right heart catheterization showed moderately reduced cardiac index with normal biventricular filling pressure.  In light of his reduced cardiac index, we decreased his beta blocker.  We started him on hydralazine and higher doses of isosorbide mononitrate for afterload reduction. As his filling pressures were low, we also decreased his Lasix.      Today, he returns for followup.  He states that he feels more tired than before.  He has also gained 10 pounds in the last 2 weeks.  He has worsening lower extremity edema.  However, he continues to work out 25 minutes a day at the fitness center.  He exercises 3-5 times a week.  He has not had any lightheadedness, dizziness or syncope.  He has not had any PND or orthopnea.  No ICD shocks.      I would currently characterize him as NYHA functional class IIIB.      REVIEW OF SYSTEMS:  A detailed 10-point  review of systems was obtained as described in the History of Present Illness.  All other systems reviewed and are negative.      MEDICATIONS:    Current Outpatient Medications   Medication Sig     aspirin 81 MG tablet Take 1 tablet (81 mg) by mouth daily     atorvastatin (LIPITOR) 40 MG tablet Take 1 tablet (40 mg) by mouth daily     B-D U/F insulin pen needle USE 1 NEEDLE DAILY AS DIRECTED -DUE FOR APPOINTMENT FOR FURTHER REFILLS     blood glucose (ACCU-CHEK SMARTVIEW) test strip USE TO TEST THREE TIMES DAILY AS DIRECTED     blood glucose monitoring (ACCU-CHEK FELIPE SMARTVIEW) meter device kit Use to test blood sugar 3-4 times daily, as directed.     carvedilol (COREG) 25 MG tablet Take 0.5 tablets (12.5 mg) by mouth 2 times daily (with meals)     clopidogrel (PLAVIX) 75 MG tablet TAKE ONE TABLET BY MOUTH EVERY DAY     furosemide (LASIX) 20 MG tablet Take 1 tablet (20 mg) by mouth daily     glimepiride (AMARYL) 4 MG tablet Take 1 tablet (4 mg) by mouth every morning (before breakfast)     hydrALAZINE (APRESOLINE) 25 MG tablet Take 1 tablet (25 mg) by mouth 3 times daily     insulin glargine (LANTUS SOLOSTAR) 100 UNIT/ML pen Take 8 units in the morning and 40 units in the evening     isosorbide mononitrate (IMDUR) 60 MG 24 hr tablet Take 1 tablet (60 mg) by mouth daily     losartan (COZAAR) 50 MG tablet Take 1 tablet (50 mg) by mouth daily     nitroglycerin (NITROSTAT) 0.4 MG SL tablet Place 1 tablet (0.4 mg) under the tongue every 5 minutes as needed for chest pain If you are still having symptoms after 3 doses (15 minutes) call 911.     order for DME Auto CPAP 8-15 cmH20     sitagliptin (JANUVIA) 50 MG tablet Take 1 tablet (50 mg) by mouth daily     No current facility-administered medications for this visit.      Facility-Administered Medications Ordered in Other Visits   Medication     sodium chloride (PF) 0.9% PF flush 10 mL     PHYSICAL EXAMINATION:  He was comfortable.  He was in no apparent distress.  His  blood pressure was 124/73.  His pulse rate was 68.  His respiratory rate was 16.  He was saturating 99% on room air.  He weighed 206 pounds.  He was 5 feet 4 inches tall.  His BMI was 35.4.  He had no pallor, cyanosis or jaundice.  His neck exam revealed no significant jugular venous distention.  His carotids were 1+ bilaterally.  Cardiac auscultation revealed normal S1 and S2, no murmur, rub or gallop.  Auscultation of the lungs revealed equal air entry on both sides with no added sounds.  His abdomen was soft with normal bowel sounds, no tenderness, no rigidity, no guarding.  He had no focal neurological deficit.  His extremities showed 2+ edema.      His chemistry showed a sodium of 136, potassium of 4.4, chloride of 103, bicarbonate of 28, BUN of 26, creatinine of 1.84 and a calcium of 8.8. His NT-proBNP was mildly elevated at 1577. His CBC showed a hemoglobin of 13.2, hematocrit of 40.6, WBC of 8.2 and a platelet count of 201.      ASSESSMENT AND PLAN:      Mr. Lucian Oliveira is a pleasant 63-year-old male with past medical history of hypertension, hyperlipidemia, diabetes, coronary artery disease, status post coronary artery bypass grafting surgery and ischemic cardiomyopathy, who returns today for followup.      Mr. Oliveira has been having worsening lower extremity swelling with a 10-pound weight gain.  He also has been feeling more tired than usual.  I am afraid that he is having gradual progression of his ischemic cardiomyopathy.      His creatinine today is better than before.  I suspect this is due to a reduction in his diuretic dose.  Probably his creatinine was high before as he was on a higher dose of diuretics with a relatively low cardiac index.  Given his 2 repeated right heart catheterizations showing low cardiac index and 2 cardiopulmonary exercises tests showing reduced VO2, and his renal function getting worse, I believe it would be appropriate to consider an LVAD or transplant at this point in  time.      I recommended him to have an evaluation done last time; however, this has not been done due to insurance issue.  He lacks Medicare supplement and he is waiting for his daughter to come to fill out the application.  I am afraid that if we wait too long, his renal function may get worse, given his low cardiac index.  I discussed with him the option of getting admitted to the hospital and have an expedited eval.  He is willing to have this done.  He wants to spend this Mother's Day weekend at home and then get admitted next Monday, which I think is reasonable.  We will have him have a right heart catheterization and admit him to the Intensive Care with a leave-in [place] and adjust his hemodynamics accordingly.  He may need to be started on an inotrope.  In the interim, I recommended him to take Lasix 60 mg for 3 days and then 40 mg daily.      His renal function is getting better.  If his renal function improves with inotropes or further optimization of his afterload reduction, he may be a candidate for a heart transplantation.  However, he is a blood type O.  He is on the heavier side.  He may need left ventricular device as a bridge to transplant. We will decide on the treatment options after he completes his initial workup.  We thank you for involving us in his care.  Please do not hesitate to call us in the interim if you have any further questions.      Sincerely,   Arvin Sheridan MD   [facility]  Heart Failure, Transplant, and Mechanical Circulatory Support Cardiology   Cardiovascular Division  [facility]   [phone]               D: 2019   T: 2019   MT: al      Name:     BUCK ALFARO   MRN:      [id number]        Account:      [id number]   :      1955           Service Date: 2019      Document: [id number]        Arvin Sheridan MD

## 2019-05-07 NOTE — PATIENT INSTRUCTIONS
You were seen today in the Cardiovascular Clinic at the [facility].       Cardiology Providers you saw during your visit: Dr. Sheridan      Medication Changes:   1. Increase lasix to 60mg for 3 days (Wednesday, Thursday and Friday). On Saturday, Sunday and Monday take 40mg lasix daily.       Patient Instructions:  1. We will plan to admit you to the hospital following you right heart cath. This is scheduled for Monday May 13th. You will check in at 9am.    Right Heart Catheterization  A Right Heart Cath is a test that measures how well your heart is pumping blood to your body and will assess the volume and pressures in your heart.     You are scheduled for a Right Heart Catheterization at the [facility], [address] [place] [address], [place] [address]. You are to check in at the Gold Waiting Area.     When you arrive you will be escorted back to the pre procedure area called 2A. Here they will insert an IV, draw labs, and obtain a short medical history. Please bring an updated list of your current medications.  A physician will come and talk with you about the procedure and obtain consent.  A nurse from the Cardiac Catheterization Lab will then escort you to the procedure area. You will receive a shot to numb the site where the catheter (tube) will enter your body.  This may be in the neck or groin - but likely your neck.  You will not receive sedation or anesthesia.   After the procedure you will recover for a short period and then be discharged.    Pre procedure instructions:   1.  Do not eat any solid food or milk products for 6 hours prior to the exam. You may drink clear liquids until 2 hours prior to the exam. Clear liquids include the following: water, Jell-O, clear broth, apple juice or any non-carbonated drink that you can see through (no pop!).   2. Do not drink alcohol or smoke 24 hours prior to test.  3. Hold these meds:  Do not take your oral diabetic  "medication or short acting insulin the day of the procedure.    Hold your warfarin, pradaxa/xarelto/eliquis 2 days prior.   4. You may take your other morning medications as prescribed with a sip of water. You may hold your diuretic that morning.        Results:    Results for BUCK ALFARO (MRN [id number]) as of 2019 12:40   Ref. Range 2019 10:15   Sodium Latest Ref Range: 133 - 144 mmol/L 136   Potassium Latest Ref Range: 3.4 - 5.3 mmol/L 4.4   Chloride Latest Ref Range: 94 - 109 mmol/L 103   Carbon Dioxide Latest Ref Range: 20 - 32 mmol/L 28   Urea Nitrogen Latest Ref Range: 7 - 30 mg/dL 26   Creatinine Latest Ref Range: 0.66 - 1.25 mg/dL 1.84 (H)   GFR Estimate Latest Ref Range: >60 mL/min/1.73_m2 37 (L)   GFR Estimate If Black Latest Ref Range: >60 mL/min/1.73_m2 43 (L)   Calcium Latest Ref Range: 8.5 - 10.1 mg/dL 8.9   Anion Gap Latest Ref Range: 3 - 14 mmol/L 5   N-Terminal Pro Bnp Latest Ref Range: 0 - 125 pg/mL 1,577 (H)   Glucose Latest Ref Range: 70 - 99 mg/dL 142 (H)   WBC Latest Ref Range: 4.0 - 11.0 10e9/L 8.2   Hemoglobin Latest Ref Range: 13.3 - 17.7 g/dL 13.2 (L)   Hematocrit Latest Ref Range: 40.0 - 53.0 % 40.6   Platelet Count Latest Ref Range: 150 - 450 10e9/L 201   RBC Count Latest Ref Range: 4.4 - 5.9 10e12/L 4.22 (L)   MCV Latest Ref Range: 78 - 100 fl 96   MCH Latest Ref Range: 26.5 - 33.0 pg 31.3   MCHC Latest Ref Range: 31.5 - 36.5 g/dL 32.5   RDW Latest Ref Range: 10.0 - 15.0 % 14.5         [address] [facility] [place] [address] [place] [address]        Thank you for allowing us to be a part of your care here at the [facility]      If you have questions or concerns please contact us at:      Meenu Yogesh, RN BSN   Cardiology Care Coordinator  [facility]   Questions and [phone]   First press #1 for the University and then press #3 for \"Medical Advice\" to reach us Cardiology Nurses.        "

## 2019-05-07 NOTE — NURSING NOTE
Chief Complaint   Patient presents with     Follow Up      RTN HF: 63 year old male presents with systolic heart failure for follow up with labs prior

## 2019-05-07 NOTE — LETTER
2019      RE: Lucian Oliveira  [address] [facility] [address]       Dear Colleague,    Thank you for the opportunity to participate in the care of your patient, Lucian Oliveira, at the [facility] at [facility]. Please see a copy of my visit note below.    Service Date: 2019      Anna Archuleta PA-C   [facility]   [id number] [place] [address]   [place] [address]      RE: Lucian Oliveira   MRN: [id number]   : 1955      Dear Ms. Wrightrenamini:      We had the pleasure of seeing Mr. Lucian Oliveira in our Advanced Heart Failure Clinic.  As you know, he is a 63-year-old male with ischemic cardiomyopathy and severe LV systolic dysfunction, who is currently on optimal medical therapy.      PAST MEDICAL HISTORY:   1.  Coronary artery disease, status post coronary artery bypass grafting surgery in .   2.  Ischemic cardiomyopathy with an estimated ejection fraction of 25%.   3.  Hypertension.   4.  Hyperlipidemia.   5.  Diabetes.   6.  Obesity.   7.  Obstructive sleep apnea.   8.  Chronic kidney disease.      We saw Mr. Oliveira approximately 6 weeks ago in clinic.  At that time, his exercise testing was concerning for significant cardiac limitation.  His VO2 was significantly reduced at 10.2 mL/kg/min.  His VE/VCO2 slope was elevated.  His right heart catheterization showed moderately reduced cardiac index with normal biventricular filling pressure.  In light of his reduced cardiac index, we decreased his beta blocker.  We started him on hydralazine and higher doses of isosorbide mononitrate for afterload reduction. As his filling pressures were low, we also decreased his Lasix.      Today, he returns for followup.  He states that he feels more tired than before.  He has also gained 10 pounds in the last 2 weeks.  He has worsening lower extremity edema.  However, he continues to work out 25 minutes a day at the fitness center.  He exercises 3-5 times a  week.  He has not had any lightheadedness, dizziness or syncope.  He has not had any PND or orthopnea.  No ICD shocks.      I would currently characterize him as NYHA functional class IIIB.      REVIEW OF SYSTEMS:  A detailed 10-point review of systems was obtained as described in the History of Present Illness.  All other systems reviewed and are negative.      MEDICATIONS:    Current Outpatient Medications   Medication Sig     aspirin 81 MG tablet Take 1 tablet (81 mg) by mouth daily     atorvastatin (LIPITOR) 40 MG tablet Take 1 tablet (40 mg) by mouth daily     B-D U/F insulin pen needle USE 1 NEEDLE DAILY AS DIRECTED -DUE FOR APPOINTMENT FOR FURTHER REFILLS     blood glucose (ACCU-CHEK SMARTVIEW) test strip USE TO TEST THREE TIMES DAILY AS DIRECTED     blood glucose monitoring (ACCU-CHEK FELIPE SMARTVIEW) meter device kit Use to test blood sugar 3-4 times daily, as directed.     carvedilol (COREG) 25 MG tablet Take 0.5 tablets (12.5 mg) by mouth 2 times daily (with meals)     clopidogrel (PLAVIX) 75 MG tablet TAKE ONE TABLET BY MOUTH EVERY DAY     furosemide (LASIX) 20 MG tablet Take 1 tablet (20 mg) by mouth daily     glimepiride (AMARYL) 4 MG tablet Take 1 tablet (4 mg) by mouth every morning (before breakfast)     hydrALAZINE (APRESOLINE) 25 MG tablet Take 1 tablet (25 mg) by mouth 3 times daily     insulin glargine (LANTUS SOLOSTAR) 100 UNIT/ML pen Take 8 units in the morning and 40 units in the evening     isosorbide mononitrate (IMDUR) 60 MG 24 hr tablet Take 1 tablet (60 mg) by mouth daily     losartan (COZAAR) 50 MG tablet Take 1 tablet (50 mg) by mouth daily     nitroglycerin (NITROSTAT) 0.4 MG SL tablet Place 1 tablet (0.4 mg) under the tongue every 5 minutes as needed for chest pain If you are still having symptoms after 3 doses (15 minutes) call 911.     order for DME Auto CPAP 8-15 cmH20     sitagliptin (JANUVIA) 50 MG tablet Take 1 tablet (50 mg) by mouth daily     No current facility-administered  medications for this visit.      Facility-Administered Medications Ordered in Other Visits   Medication     sodium chloride (PF) 0.9% PF flush 10 mL     PHYSICAL EXAMINATION:  He was comfortable.  He was in no apparent distress.  His blood pressure was 124/73.  His pulse rate was 68.  His respiratory rate was 16.  He was saturating 99% on room air.  He weighed 206 pounds.  He was 5 feet 4 inches tall.  His BMI was 35.4.  He had no pallor, cyanosis or jaundice.  His neck exam revealed no significant jugular venous distention.  His carotids were 1+ bilaterally.  Cardiac auscultation revealed normal S1 and S2, no murmur, rub or gallop.  Auscultation of the lungs revealed equal air entry on both sides with no added sounds.  His abdomen was soft with normal bowel sounds, no tenderness, no rigidity, no guarding.  He had no focal neurological deficit.  His extremities showed 2+ edema.      His chemistry showed a sodium of 136, potassium of 4.4, chloride of 103, bicarbonate of 28, BUN of 26, creatinine of 1.84 and a calcium of 8.8. His NT-proBNP was mildly elevated at 1577. His CBC showed a hemoglobin of 13.2, hematocrit of 40.6, WBC of 8.2 and a platelet count of 201.      ASSESSMENT AND PLAN:      Mr. Lucian Oliveira is a pleasant 63-year-old male with past medical history of hypertension, hyperlipidemia, diabetes, coronary artery disease, status post coronary artery bypass grafting surgery and ischemic cardiomyopathy, who returns today for followup.      Mr. Oliveira has been having worsening lower extremity swelling with a 10-pound weight gain.  He also has been feeling more tired than usual.  I am afraid that he is having gradual progression of his ischemic cardiomyopathy.      His creatinine today is better than before.  I suspect this is due to a reduction in his diuretic dose.  Probably his creatinine was high before as he was on a higher dose of diuretics with a relatively low cardiac index.  Given his 2 repeated right  heart catheterizations showing low cardiac index and 2 cardiopulmonary exercises tests showing reduced VO2, and his renal function getting worse, I believe it would be appropriate to consider an LVAD or transplant at this point in time.      I recommended him to have an evaluation done last time; however, this has not been done due to insurance issue.  He lacks Medicare supplement and he is waiting for his daughter to come to fill out the application.  I am afraid that if we wait too long, his renal function may get worse, given his low cardiac index.  I discussed with him the option of getting admitted to the hospital and have an expedited eval.  He is willing to have this done.  He wants to spend this Mother's Day weekend at home and then get admitted next Monday, which I think is reasonable.  We will have him have a right heart catheterization and admit him to the Intensive Care with a leave-in [place] and adjust his hemodynamics accordingly.  He may need to be started on an inotrope.  In the interim, I recommended him to take Lasix 60 mg for 3 days and then 40 mg daily.      His renal function is getting better.  If his renal function improves with inotropes or further optimization of his afterload reduction, he may be a candidate for a heart transplantation.  However, he is a blood type O.  He is on the heavier side.  He may need left ventricular device as a bridge to transplant. We will decide on the treatment options after he completes his initial workup.  We thank you for involving us in his care.  Please do not hesitate to call us in the interim if you have any further questions.      Sincerely,   Arvin Sheridan MD   [facility]  Heart Failure, Transplant, and Mechanical Circulatory Support Cardiology   Cardiovascular Division  [facility]   [phone]           D: 05/07/2019   T: 05/07/2019   MT: al      Name:     BUCK ALFARO   MRN:       [id number]        Account:      [id number]   :      1955           Service Date: 2019      Document: [id number]

## 2019-05-14 DIAGNOSIS — I50.20 SYSTOLIC HEART FAILURE (H): Primary | ICD-10-CM

## 2019-05-14 NOTE — PROGRESS NOTES
Transplant referral:  Mr Tee Henderson is a gentleman followed by Dr Sheridan for ischemic cardiomyopathy and heart failure who has been referred for evaluation for heart transplant and possible VAD.  I contacted the pt and with the assistance of  María Elena, introduced our program, explained the eval process, and answer questions.  The patient was scheduled for a right heart cath yesterday, but he cancell the procedure for unknown reasons.  He also requests to defer the hybrid evaluation until the week of June 10th.  Dr Sheridan aware;  Will plan to do RHC at that time.   Mr Oliveira has our phone number in the Transplant Office for questions or concerns in the interim.

## 2019-05-14 NOTE — Clinical Note
This pt needs a .  He also wants to wait until the week of June 10th to start his eval.   He needs the full VAD/tx eval, including a RHC.  He was to have had the cath yesterday, but he cancelled it.  Therefore, I think he still has an active case request order that you should be able to use.    He's Dr Sheridan's patient, so if he has any openings in the cath lab that week, that would be ideal.  Thanks!Bill

## 2019-05-17 ENCOUNTER — APPOINTMENT (OUTPATIENT)
Dept: CT IMAGING | Facility: CLINIC | Age: 64
End: 2019-05-17
Attending: EMERGENCY MEDICINE
Payer: COMMERCIAL

## 2019-05-17 ENCOUNTER — OFFICE VISIT (OUTPATIENT)
Dept: FAMILY MEDICINE | Facility: CLINIC | Age: 64
End: 2019-05-17
Payer: COMMERCIAL

## 2019-05-17 ENCOUNTER — HOSPITAL ENCOUNTER (EMERGENCY)
Facility: CLINIC | Age: 64
Discharge: HOME | End: 2019-05-17
Attending: EMERGENCY MEDICINE | Admitting: EMERGENCY MEDICINE
Payer: COMMERCIAL

## 2019-05-17 VITALS
TEMPERATURE: 100.7 F | DIASTOLIC BLOOD PRESSURE: 66 MMHG | WEIGHT: 202 LBS | OXYGEN SATURATION: 97 % | BODY MASS INDEX: 34.67 KG/M2 | SYSTOLIC BLOOD PRESSURE: 116 MMHG | HEART RATE: 93 BPM

## 2019-05-17 VITALS
DIASTOLIC BLOOD PRESSURE: 73 MMHG | SYSTOLIC BLOOD PRESSURE: 128 MMHG | OXYGEN SATURATION: 99 % | WEIGHT: 206 LBS | RESPIRATION RATE: 16 BRPM | BODY MASS INDEX: 35.36 KG/M2 | HEART RATE: 95 BPM

## 2019-05-17 DIAGNOSIS — H54.7 VISUAL LOSS: Primary | ICD-10-CM

## 2019-05-17 DIAGNOSIS — E11.21 TYPE 2 DIABETES MELLITUS WITH DIABETIC NEPHROPATHY, WITH LONG-TERM CURRENT USE OF INSULIN (H): ICD-10-CM

## 2019-05-17 DIAGNOSIS — H43.13 VITREOUS HEMORRHAGE OF BOTH EYES (H): ICD-10-CM

## 2019-05-17 DIAGNOSIS — Z79.4 TYPE 2 DIABETES MELLITUS WITH DIABETIC NEPHROPATHY, WITH LONG-TERM CURRENT USE OF INSULIN (H): ICD-10-CM

## 2019-05-17 LAB
ANION GAP SERPL CALCULATED.3IONS-SCNC: 8 MMOL/L (ref 3–14)
APTT PPP: 27 SEC (ref 22–37)
B-HCG FREE SERPL-ACNC: 1.1 [IU]/L (ref 0.86–1.14)
BASOPHILS # BLD AUTO: 0 10E9/L (ref 0–0.2)
BASOPHILS NFR BLD AUTO: 0.3 %
BUN SERPL-MCNC: 26 MG/DL (ref 7–30)
CALCIUM SERPL-MCNC: 9 MG/DL (ref 8.5–10.1)
CHLORIDE SERPL-SCNC: 100 MMOL/L (ref 94–109)
CO2 SERPL-SCNC: 28 MMOL/L (ref 20–32)
CREAT BLD-MCNC: 2.1 MG/DL (ref 0.66–1.25)
CREAT SERPL-MCNC: 1.83 MG/DL (ref 0.66–1.25)
DIFFERENTIAL METHOD BLD: ABNORMAL
EOSINOPHIL # BLD AUTO: 0.3 10E9/L (ref 0–0.7)
EOSINOPHIL NFR BLD AUTO: 1.7 %
ERYTHROCYTE [DISTWIDTH] IN BLOOD BY AUTOMATED COUNT: 14.1 % (ref 10–15)
GFR SERPL CREATININE-BSD FRML MDRD: 32 ML/MIN/{1.73_M2}
GFR SERPL CREATININE-BSD FRML MDRD: 38 ML/MIN/{1.73_M2}
GLUCOSE BLDC GLUCOMTR-MCNC: 140 MG/DL (ref 70–99)
GLUCOSE SERPL-MCNC: 137 MG/DL (ref 70–99)
HCT VFR BLD AUTO: 42.9 % (ref 40–53)
HGB BLD-MCNC: 14 G/DL (ref 13.3–17.7)
IMM GRANULOCYTES # BLD: 0.1 10E9/L (ref 0–0.4)
IMM GRANULOCYTES NFR BLD: 0.6 %
LYMPHOCYTES # BLD AUTO: 1.5 10E9/L (ref 0.8–5.3)
LYMPHOCYTES NFR BLD AUTO: 9.7 %
MCH RBC QN AUTO: 30.6 PG (ref 26.5–33)
MCHC RBC AUTO-ENTMCNC: 32.6 G/DL (ref 31.5–36.5)
MCV RBC AUTO: 94 FL (ref 78–100)
MONOCYTES # BLD AUTO: 1.2 10E9/L (ref 0–1.3)
MONOCYTES NFR BLD AUTO: 8.1 %
NEUTROPHILS # BLD AUTO: 12 10E9/L (ref 1.6–8.3)
NEUTROPHILS NFR BLD AUTO: 79.6 %
NRBC # BLD AUTO: 0 10*3/UL
NRBC BLD AUTO-RTO: 0 /100
PLATELET # BLD AUTO: 230 10E9/L (ref 150–450)
POTASSIUM SERPL-SCNC: 3.9 MMOL/L (ref 3.4–5.3)
RBC # BLD AUTO: 4.57 10E12/L (ref 4.4–5.9)
SODIUM SERPL-SCNC: 136 MMOL/L (ref 133–144)
TROPONIN I BLD-MCNC: 0 UG/L (ref 0–0.08)
WBC # BLD AUTO: 15 10E9/L (ref 4–11)

## 2019-05-17 PROCEDURE — 99284 EMERGENCY DEPT VISIT MOD MDM: CPT | Mod: 25 | Performed by: EMERGENCY MEDICINE

## 2019-05-17 PROCEDURE — 82565 ASSAY OF CREATININE: CPT

## 2019-05-17 PROCEDURE — 00000146 ZZHCL STATISTIC GLUCOSE BY METER IP

## 2019-05-17 PROCEDURE — 70450 CT HEAD/BRAIN W/O DYE: CPT

## 2019-05-17 PROCEDURE — 84484 ASSAY OF TROPONIN QUANT: CPT

## 2019-05-17 PROCEDURE — 80048 BASIC METABOLIC PNL TOTAL CA: CPT | Performed by: EMERGENCY MEDICINE

## 2019-05-17 PROCEDURE — 99213 OFFICE O/P EST LOW 20 MIN: CPT | Performed by: FAMILY MEDICINE

## 2019-05-17 PROCEDURE — 99285 EMERGENCY DEPT VISIT HI MDM: CPT | Mod: Z6 | Performed by: EMERGENCY MEDICINE

## 2019-05-17 PROCEDURE — 85025 COMPLETE CBC W/AUTO DIFF WBC: CPT | Performed by: EMERGENCY MEDICINE

## 2019-05-17 PROCEDURE — 85730 THROMBOPLASTIN TIME PARTIAL: CPT | Performed by: EMERGENCY MEDICINE

## 2019-05-17 PROCEDURE — 85610 PROTHROMBIN TIME: CPT

## 2019-05-17 ASSESSMENT — VISUAL ACUITY
OU: BLURRED VISION;OTHER (SEE COMMENT)
OU: BLURRED VISION;OTHER (SEE COMMENT)

## 2019-05-17 ASSESSMENT — ENCOUNTER SYMPTOMS
NUMBNESS: 0
WEAKNESS: 0

## 2019-05-17 NOTE — ED TRIAGE NOTES
Pt arrived in triage with concerns of blurry vision since 0830 this morning. Pt is Armenian speaking. No other neuro deficits noted.

## 2019-05-17 NOTE — ED PROVIDER NOTES
"  History     Chief Complaint   Patient presents with     Eye Problem     The history is provided by the patient and medical records. The history is limited by a language barrier. A  was used (In Person (Son) -  Australian).     Lucian Henderson Sr. is a 63 year old male with a history of type 2 diabetes, HLD, HTN, CHF s/p ICD placement, and CKD stage 3, who presents to the Emergency Department for evaluation of possible stroke. The patient reports he woke up \"blind\" at approximately 8:30 AM this morning (05/17/2019). At this time, the patient states he can see shapes, and is able to count the fingers on the providers hand; complaining only of \"blurry vision\". The patient denies numbness or weakness.     I have reviewed the Medications, Allergies, Past Medical and Surgical History, and Social History in the [facility] system.    Past Medical History:   Diagnosis Date     CAD (coronary artery disease)      CHF (congestive heart failure) (H)      CKD (chronic kidney disease), stage III (H)      Cortical cataract of both eyes      Diabetes (H)      Hyperlipidemia      Hypertension      Ischemic cardiomyopathy      Obesity      CHANTEL (obstructive sleep apnea)      Osteoarthritis        Past Surgical History:   Procedure Laterality Date     C CABG, ARTERY-VEIN, THREE  02/2008     CV RIGHT HEART CATH N/A 3/25/2019    Procedure: CV RIGHT HEART CATH;  Surgeon: Moises Santos MD;  Location:  HEART CARDIAC CATH LAB     IMPLANT AUTOMATIC IMPLANTABLE CARDIOVERTER DEFIBRILLATOR         Family History   Problem Relation Age of Onset     Diabetes Brother      Diabetes Sister      Diabetes Sister      Macular Degeneration No family hx of      Glaucoma No family hx of      Myocardial Infarction No family hx of      Kidney Disease No family hx of        Social History     Tobacco Use     Smoking status: Never Smoker     Smokeless tobacco: Never Used     Tobacco comment: Never smoked; non-smoking household   Substance " Use Topics     Alcohol use: No     Alcohol/week: 0.0 oz       No current facility-administered medications for this encounter.      Current Outpatient Medications   Medication     aspirin 81 MG tablet     atorvastatin (LIPITOR) 40 MG tablet     B-D U/F insulin pen needle     blood glucose (ACCU-CHEK SMARTVIEW) test strip     blood glucose monitoring (ACCU-CHEK FELIPE SMARTVIEW) meter device kit     carvedilol (COREG) 25 MG tablet     clopidogrel (PLAVIX) 75 MG tablet     furosemide (LASIX) 20 MG tablet     glimepiride (AMARYL) 4 MG tablet     hydrALAZINE (APRESOLINE) 25 MG tablet     insulin glargine (LANTUS SOLOSTAR) 100 UNIT/ML pen     isosorbide mononitrate (IMDUR) 60 MG 24 hr tablet     losartan (COZAAR) 50 MG tablet     nitroglycerin (NITROSTAT) 0.4 MG SL tablet     order for DME     sitagliptin (JANUVIA) 50 MG tablet     Facility-Administered Medications Ordered in Other Encounters   Medication     sodium chloride (PF) 0.9% PF flush 10 mL        Allergies   Allergen Reactions     No Known Drug Allergies        Review of Systems   Eyes: Positive for visual disturbance (blurred).   Neurological: Negative for weakness and numbness.   All other systems reviewed and are negative.      Physical Exam   BP: 127/63  Pulse: 100  Resp: 18  Weight: 93.4 kg (206 lb)  SpO2: 98 %      Physical Exam   Constitutional: He is oriented to person, place, and time. He appears well-developed and well-nourished. No distress.   HENT:   Head: Atraumatic.   Mouth/Throat: Oropharynx is clear and moist.   Eyes: Pupils are equal, round, and reactive to light. No scleral icterus.   Cardiovascular: Normal rate, regular rhythm, normal heart sounds and intact distal pulses.   Pulmonary/Chest: Breath sounds normal. No respiratory distress.   Abdominal: Soft. Bowel sounds are normal. There is no tenderness.   Musculoskeletal: He exhibits no edema or tenderness.   Neurological: He is alert and oriented to person, place, and time. He displays normal  reflexes. He exhibits normal muscle tone. Coordination normal.   Skin: Skin is warm. No rash noted. He is not diaphoretic.   Nursing note and vitals reviewed.      ED Course   4:27 PM  The patient was seen and examined by Dr. Golden in Room ED01.        Procedures              Critical Care time:  none             Labs Ordered and Resulted from Time of ED Arrival Up to the Time of Departure from the ED - No data to display         Assessments & Plan (with Medical Decision Making)     63 year old male with a history of type 2 diabetes, HLD, HTN, CHF s/p ICD placement, and CKD stage 3, who presents to the Emergency Department for evaluation of possible stroke, with onset of vision loss upon awakening around 8:30 AM this morning.  IV established, labs drawn sent reviewed document in epic and essentially all unremarkable except for a CBC with a white count of 15,000, and electrolytes with a creatinine of 1.83.  A stroke team activated, neurology evaluated the patient at the bedside and accompanied him to the CT scanning suite.  CT head noncontrast obtained and revealed no acute process.  Ophthalmology then consulted to evaluate the patient's fundus which revealed bilateral vitreous hemorrhage.  At this point neuro stroke service signed off and deferred to ophthalmology recommendations.  Patient to be seen in retina clinic as soon as possible.  Scheduling by ophthalmology.    I have reviewed the nursing notes.    I have reviewed the findings, diagnosis, plan and need for follow up with the patient.       Medication List      There are no discharge medications for this visit.         Final diagnoses:   Vitreous hemorrhage of both eyes (H)   Jakub FONTAINE, am serving as a trained medical scribe to document services personally performed by Nory Golden MD, based on the provider's statements to me.      Nory FONATINE MD, was physically present and have reviewed and verified the accuracy of this note documented by  Jakub Pulido.     5/17/2019   [facility], [place], EMERGENCY DEPARTMENT     [place], Nory ACUNA MD  05/18/19 [id number]

## 2019-05-17 NOTE — CONSULTS
OPHTHALMOLOGY CONSULT NOTE  05/17/19    Patient: Lucian Henderson Sr.  Consulted by: ED team/Neurology  Reason for Consult: Vision loss    HISTORY OF PRESENTING ILLNESS:     Lucian Henderson Sr. is a 63 year old male with PMHx of  T2DM with neuropathy, HLD, HTN, CHF s/p ICD placement, CKD, CHANTEL and CAD who presented to ED with bilateral vision loss right>left eye. Patient is poor historian but states he noticed around 8 AM this morning that his vision was blurry in both eyes. Also notes hundreds of small black floaters in his right eye. States vision has improved in both eyes since onset but is not back to baseline. Denies flashes of light, double vision, eye pain, pain with eye movements or loss of visual field. No recent head trauma. No hx of ocular surgery.    Stroke code called on presentation to the ED. Evaluated by neurology and found to be neurologically intact without focal deficits. CT head with encephalomalacia of inferolateral left frontal lobe but no signs of hemorrhage. Unable to have MRI due to pacemaker.    Review of systems were otherwise negative except for that which has been stated above.    OCULAR/MEDICAL/SURGICAL HISTORIES:     Past Ocular History:  Moderate nonproliferative diabetic retinopathy of both eyes  Cataracts  Ptosis    Past Medical History:   Diagnosis Date     CAD (coronary artery disease)      CHF (congestive heart failure) (H)      CKD (chronic kidney disease), stage III (H)      Cortical cataract of both eyes      Diabetes (H)      Hyperlipidemia      Hypertension      Ischemic cardiomyopathy      Obesity      CHANTEL (obstructive sleep apnea)      Osteoarthritis        Past Surgical History:   Procedure Laterality Date     C CABG, ARTERY-VEIN, THREE  02/2008     CV RIGHT HEART CATH N/A 3/25/2019    Procedure: CV RIGHT HEART CATH;  Surgeon: Moises Santos MD;  Location:  HEART CARDIAC CATH LAB     IMPLANT AUTOMATIC IMPLANTABLE CARDIOVERTER DEFIBRILLATOR          EXAMINATION:     Base Eye Exam     Visual Acuity       Right Left    Near cc 20/100 20/60-   Notes no change with or without +2.50 OTC readers           Tonometry (Tonopen, 6:09 PM)       Right Left    Pressure 17 15          Pupils       Pupils React APD    Right PERRL Brisk None    Left PERRL Brisk None          Visual Fields       Left Right     Full Full          Extraocular Movement       Right Left     Nystagmus Nystagmus     -- -2 --   --  --   -- -- --    -- -2 --   --  --   -- -- --      Poor cooperation with exam. Mild end-gaze nystagmus           Neuro/Psych     Oriented x3:  Yes    Mood/Affect:  Normal          Dilation     Both eyes:  1.0% Mydriacyl, 2.5% Alirio Synephrine @ 6:00 PM            Additional Tests     Color       Right Left    PIP 10/14 10/14            Slit Lamp and Fundus Exam     External Exam       Right Left    External Normal Normal          Slit Lamp Exam       Right Left    Lids/Lashes Ptosis OD>OS Ptosis OD>OS    Conjunctiva/Sclera White and quiet White and quiet    Cornea Clear Clear    Anterior Chamber Deep and quiet Deep and quiet    Iris Dilated Dilated    Lens 2+ NSC, 2+ cortical, 2+ PSC 2+ brunescent NSC, 2+ cortical, 1+PSC    Vitreous Small vit heme inferiorly Small vit heme inferiorly          Fundus Exam       Right Left    Disc Normal, no appreciable NVD Fibrous membrane stemming from nerve    C/D Ratio 0.2 0.2    Macula 2 DD hemorrhage centrally and two similar preretinal hemorrhages along inferior arcade Edema, scattered exudates, few hemorrhages    Vessels Normal Normal    Periphery Scattered MA's and DBH, no holes or tears appreciated Scattered MA's and DBH, no holes or tears appreciated              Labs/Studies/Imaging Performed  Lab Results   Component Value Date    A1C 8.5 03/11/2019    A1C 7.6 10/16/2018    A1C 9.8 09/06/2017    A1C 9.1 07/05/2017    A1C 10.5 04/27/2017     CBC RESULTS:   Recent Labs   Lab Test 05/17/19  1642   WBC 15.0*   RBC 4.57   HGB 14.0    HCT 42.9   MCV 94   MCH 30.6   MCHC 32.6   RDW 14.1        CT Head w/o contrast   Impression: Encephalomalacia in the inferolateral left frontal lobe.  No definite acute loss of gray-white differentiation.     ASSESSMENT/PLAN:     Lucian Henderson Sr. is a 63 year old male with PMHx of  T2DM with neuropathy, HLD, HTN, CHF s/p ICD placement, CKD, CHANTEL and CAD who presented to ED with bilateral vision loss right>left eye. Noted hundreds of black floaters in the right eye. Exam with bilateral vitreous and retinal hemorrhages in the right>left eye.    1. Bilateral vitreous and retinal hemorrhages, Right>Left Eye   - Suspect due to proliferative diabetic retinopathy, less likely due to hemorrhagic PVD's   - Dilated exam with small vitreous hemorrhage and several preretinal hemorrhages in the right eye. Central macular hemorrhage that would explain poor vision in the right eye. Some macular edema in left eye. No holes or tears appreciated on dilated exam   - Last A1c 8.5% (03/2019) - chart review with previous values >10%   - Currently on ASA/plavix with hx of CAD/ICD/CHF - continue for now   - Emphasized importance of good BG/BP control   - Avoid heavy lifting or straining due to hemorrhages   - Recommend evaluation in retina clinic early next week - message sent to RN for scheduling   - Discussed return precautions including new showers of floaters, worsening vision or loss of visual field    2. Cataracts, Both Eyes   - Likely visually significant   - Outpatient follow-up    It is our pleasure to participate in this patient's care and treatment. Please contact us with any further questions or concerns.    Discussed with Dr. Sellers.    Ronak Perez MD  Ophthalmology Resident, PGY-2  Pager: [phone]

## 2019-05-17 NOTE — ED AVS SNAPSHOT
[facility], [place], Emergency Department  [address] [place] [id number]  Phone:  [phone]                                    Lucian Henderson    MRN: [id number]    Department:  [facility], Emergency Department   Date of Visit:  5/17/2019           After Visit Summary Signature Page    I have received my discharge instructions, and my questions have been answered. I have discussed any challenges I see with this plan with the nurse or doctor.    ..........................................................................................................................................  Patient/Patient Representative Signature      ..........................................................................................................................................  Patient Representative Print Name and Relationship to Patient    ..................................................               ................................................  Date                                   Time    ..........................................................................................................................................  Reviewed by Signature/Title    ...................................................              ..............................................  Date                                               Time          22EPIC Rev 08/18

## 2019-05-17 NOTE — NURSING NOTE
VISION     No corrective lenses  Tool used: Skinny   Right eye:       10/63  Left eye:          10/63  Both eyes:     10/50    Lakisha Donovan MA

## 2019-05-17 NOTE — CONSULTS
[facility], [place]      Neurology Stroke Code    Patient Name: Lucian Henderson Sr.  : 1955 MRN#: [id number]    STROKE DATA     Stroke Code:  Time called:  2019 1624  Time patient seen:  2019 1625  Onset of symptoms:  2019 0830  Last known normal (pt's baseline):  2019 0830  Head CT read by Dr. Maguire at:  2019 1638  Stroke Code de-escalated at 2019 1642 after discussion with Dr. GUILLERMO Maguire due to symptoms not likely caused by stroke.      TPA treatment:  Not given due to outside the time window, minor symptoms not likely d/t stroke    National Institutes of Health Stroke Scale (at presentation)  NIHSS done at:  time patient seen      Score    Level of consciousness:  (0)   Alert, keenly responsive    LOC questions:  (0)   Answers both questions correctly    LOC commands:  (1)   Performs one task correctly    Best gaze:  (0)   Normal    Visual:  (0)   No visual loss    Facial palsy:  (0)   Normal symmetrical movements    Motor arm (left):  (0)   No drift    Motor arm (right):  (0)   No drift    Motor leg (left):  (0)   No drift    Motor leg (right):  (0)   No drift    Limb ataxia:  (0)   Absent    Sensory:  (0)   Normal- no sensory loss    Best language:  (0)   Normal- no aphasia    Dysarthria:  (0)   Normal    Extinction and inattention:  (0)   No abnormality        NIHSS Total Score:  1           ASSESSMENT & RECOMMENDATONS     Stroke code activated due to blurry vision, patient describes as lines and flashes in both eyes. History of L frontal infarct in ~ plus HTN, HLD, DM2, and CHF.    Recommendations:   -Ophthalmology consult  -Cannot get MRI d/t pacemaker; could consider repeat CT in AM to eval for small occipital stroke if no other explanations are forthcoming, but this is unlikely to be the cause of his symptoms  -Agree with admit to obs for further workup  -Continue PTA aspirin/plavix    The patient will be managed by  the ED team and  we will sign off at this time.  Thank you for the consult.  Contact the stroke team if you have any further questions    The patient was discussed with the Fellow, Dr. Dasilva.  The staff is Dr. Maguire.    Sonam Fenton MD   Pager: [phone]

## 2019-05-17 NOTE — PROGRESS NOTES
SUBJECTIVE:   Lucian Henderson Sr. is a 63 year old male who presents to clinic today for the following   health issues:    Eye(s) Problem  Onset: This Morning    Description:   Location: bilateral  Pain: no   Redness: no     Accompanying Signs & Symptoms:  Discharge/mattering: no   Swelling: no   Visual changes: YES- Static  Fever: Feels warm  Nasal Congestion: no  Bothered by bright lights: no     History:   Trauma: no   Foreign body exposure: no     Precipitating factors:   Wearing contacts: no     Alleviating factors:  Improved by: Nothing    Patient has no pain in the eyes   He does not itch   He has Diabetes     Lab Results   Component Value Date    A1C 8.5 03/11/2019    A1C 7.6 10/16/2018    A1C 9.8 09/06/2017    A1C 9.1 07/05/2017    A1C 10.5 04/27/2017       O: /66 (BP Location: Right arm, Patient Position: Sitting, Cuff Size: Adult Large)   Pulse 93   Temp 100.7  F (38.2  C) (Oral)   Wt 91.6 kg (202 lb)   SpO2 97%   BMI 34.67 kg/m      Nothing seen visually to explain sudden loss of vision   Complains of some light sensitivity   No redness noted   No watering of the eyes    Vision check shows patient's vision is about 20/100 bilaterally and the patient states this is new.   He states he is seeing straight lines across his vision         ICD-10-CM    1. Visual loss H54.7    2. Type 2 diabetes mellitus with diabetic nephropathy, with long-term current use of insulin (H) E11.21     Z79.4      I have questioned whether the patient's visual loss was sudden.  He states he woke up today and his vision was suddenly worse in both eyes   He insists this is a new problem   He did not describe a curtain falling   It has not changes since he got up this am   I am not sure if this is related to a communication problem or whether patient truly does ana a new onset visual loss.      I have referred to er where he can get more definitive evaluation.

## 2019-05-17 NOTE — PHARMACY
Pharmacy Stroke Code Response  Pharmacist responded as part of the Stroke Code Team activation to patient care area: Emergency Department.  The Stroke Team determined that the patient was not a candidate for IV alteplase therapy and the pharmacy team was dismissed at 16:34.     Anna Garcia, PharmD

## 2019-05-17 NOTE — PROGRESS NOTES
Stroke Code Nurse-Responder Note    Arrival Time to Stroke Code: 1628    Stroke Code Team interventions:   - De-escalated at 1643 by Dr. Maguire    ED/Bedside Nurse providing handoff: Kera    Time left for CT: 1634    Time arrived to next location (ED/Unit/IR): 1640  ED/Bedside Nurse given handoff (name/time): Kera/1650    Marcy Blake RN

## 2019-05-18 LAB — INTERPRETATION ECG - MUSE: NORMAL

## 2019-05-18 NOTE — DISCHARGE INSTRUCTIONS
Please follow up with Retina Clinic next week as discussed. They will call you on Monday to schedule an appointment. If your symptoms get worse before you are seen, or if you develop any new symptoms, then please return to the ER immediately.

## 2019-05-27 DIAGNOSIS — I50.9 CHF (NYHA CLASS II, ACC/AHA STAGE C) (H): Chronic | ICD-10-CM

## 2019-05-27 DIAGNOSIS — I50.22 CHRONIC SYSTOLIC HEART FAILURE (H): ICD-10-CM

## 2019-05-27 DIAGNOSIS — R06.2 WHEEZING: ICD-10-CM

## 2019-05-27 DIAGNOSIS — N18.30 CKD (CHRONIC KIDNEY DISEASE) STAGE 3, GFR 30-59 ML/MIN (H): Chronic | ICD-10-CM

## 2019-05-30 ENCOUNTER — OFFICE VISIT (OUTPATIENT)
Dept: OPHTHALMOLOGY | Facility: CLINIC | Age: 64
End: 2019-05-30
Attending: OPHTHALMOLOGY
Payer: COMMERCIAL

## 2019-05-30 ENCOUNTER — TELEPHONE (OUTPATIENT)
Dept: OPHTHALMOLOGY | Facility: CLINIC | Age: 64
End: 2019-05-30

## 2019-05-30 DIAGNOSIS — E11.3513 TYPE 2 DIABETES MELLITUS WITH PROLIFERATIVE RETINOPATHY OF BOTH EYES AND MACULAR EDEMA, UNSPECIFIED WHETHER LONG TERM INSULIN USE (H): Primary | ICD-10-CM

## 2019-05-30 PROCEDURE — C9257 BEVACIZUMAB INJECTION: HCPCS | Mod: ZF | Performed by: OPHTHALMOLOGY

## 2019-05-30 PROCEDURE — 92235 FLUORESCEIN ANGRPH MLTIFRAME: CPT | Mod: ZF | Performed by: OPHTHALMOLOGY

## 2019-05-30 PROCEDURE — G0463 HOSPITAL OUTPT CLINIC VISIT: HCPCS | Mod: ZF,25

## 2019-05-30 PROCEDURE — 92134 CPTRZ OPH DX IMG PST SGM RTA: CPT | Mod: ZF | Performed by: OPHTHALMOLOGY

## 2019-05-30 PROCEDURE — 25000128 H RX IP 250 OP 636: Mod: ZF | Performed by: OPHTHALMOLOGY

## 2019-05-30 PROCEDURE — 67028 INJECTION EYE DRUG: CPT | Mod: RT,ZF | Performed by: OPHTHALMOLOGY

## 2019-05-30 RX ADMIN — Medication 1.25 MG: at 17:13

## 2019-05-30 ASSESSMENT — TONOMETRY
OS_IOP_MMHG: 8
OD_IOP_MMHG: 8
IOP_METHOD: ICARE

## 2019-05-30 ASSESSMENT — REFRACTION_WEARINGRX
OS_SPHERE: -1.00
OD_ADD: +2.50
OS_ADD: +2.50
OD_CYLINDER: SPHERE
OD_SPHERE: -0.50
OS_CYLINDER: +1.00
OS_AXIS: 180

## 2019-05-30 ASSESSMENT — CONF VISUAL FIELD
OD_NORMAL: 1
OS_NORMAL: 1

## 2019-05-30 ASSESSMENT — SLIT LAMP EXAM - LIDS
COMMENTS: PTOSIS OD>OS
COMMENTS: PTOSIS OD>OS

## 2019-05-30 ASSESSMENT — VISUAL ACUITY
METHOD: SNELLEN - LINEAR
OS_SC: 20/70
OD_SC: 20/50

## 2019-05-30 ASSESSMENT — CUP TO DISC RATIO
OS_RATIO: 0.2
OD_RATIO: 0.2

## 2019-05-30 ASSESSMENT — EXTERNAL EXAM - LEFT EYE: OS_EXAM: NORMAL

## 2019-05-30 ASSESSMENT — EXTERNAL EXAM - RIGHT EYE: OD_EXAM: NORMAL

## 2019-05-30 NOTE — LETTER
5/30/2019       RE: Lucian Henderson Sr.  [address] [facility] [address]     Dear Colleague,    Thank you for referring your patient, Lucian Henderson Sr., to the EYE CLINIC at [facility]. Please see a copy of my visit note below.    CC: blurred vision in both eyes for 2 weeks   HPI: Two weeks ago, he woke up and suddenly had blurred vision in both eyes. He went to the ED for possible stroke workup but was found to have count fingers visual acuity. He was evaluated by Dr. Ronak Perez and found to have bilateral vitreous hemorrhages.    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 No neovascularization of the disc. Microaneurysms   - left eye: neovascularization elsewhere, severe peripheral ischemia. (+) neovascularization of the disc. Microaneurysms     optos pic consistent with exam  optos Autofluorescence hypoautofluorescence of the hemes    Optical Coherence Tomography 05/30/19   - RE: vitreous opacities; rare cystic changes    - LE: Epiretinal membrane  Extramacular Optical Coherence Tomography showed tractional retinal detachment about 4DD inferior to the macula and another Tractional retinal detachment supero-temporal quadrant left eye     Assessment and Plan  1. Proliferative diabetic retinopathy with vitreous hemorrhages, both eyes  Localized outside macula Tractional retinal detachment in left eye  - Avastin 1.25mg injection today in both eyes   - Counseled extensively on  risks and retinal detachment, endophthalmitis symptoms and the importance of calling.    -  Plan for Pan-retinal photocoagulation in both eyes next week  - Urgent vitrectomy not necessary at this point for left eye but will reassess at next visit. his cardiorespiratory comorbidities make general anesthesia very high risk.     2. Nuclear sclerotic and cortical cataracts, both eyes  - will need cataract evaluation     3. Epiretinal membrane left eye   - will consider surgery in the future    Giovanny Moseley, medical student year 4    ~~~~~~~~~~~~~~~~~~~~~~~~~~~~~~~~~~  Complete documentation of historical and exam elements from today's encounter can be found in the full encounter summary report (not reduplicated in this progress note).  I personally obtained the chief complaint(s) and history of present illness.  I confirmed and edited as necessary the review of systems, past medical/surgical history, family history, social history, and examination findings as documented by others; and I examined the patient myself.  I personally reviewed the relevant tests, images, and reports as documented above.  I formulated and edited as necessary the assessment and plan and discussed the findings and management plan with the patient and family and No resident or fellow assisted with the procedures performed.  I performed the procedures myself. Triny Bunch MD    Again, thank you for allowing me to participate in the care of your patient.      Sincerely,    Triny Bunch M.D.   of Ophthalmology  Vitreoretinal Surgeon  [facility]  Department of Ophthalmology & Visual Neurosciences  [facility]  Phone:  [phone]   Fax:  [phone]

## 2019-05-30 NOTE — TELEPHONE ENCOUNTER
Pt did not make 5-22-19 appt with dr. Bunch  Pt seen in hospital as consult  H/o diabetes-- vitreous hemorrhage    Pt reporting worsening symptoms-- dark spots/lines in vision    Scheduled with Dr. Bunch this afternoon 1415  David Blackwell RN 11:48 AM 05/30/19        [facility] Call Center    Phone Message    May a detailed message be left on voicemail: yes    Reason for Call: Symptoms or Concerns     If patient has red-flag symptoms, warm transfer to triage line    Current symptom or concern: per pts wife and - stated pt has floaters and lines he sees in both eyes    Symptoms have been present for:  2 week(s)    Has patient previously been seen for this? No    By : n/a    Date: n/a    Are there any new or worsening symptoms? Yes: new      Action Taken: Message routed to:  [facility] eye

## 2019-05-30 NOTE — PROGRESS NOTES
CC: blurred vision in both eyes for 2 weeks   HPI: Two weeks ago, he woke up and suddenly had blurred vision in both eyes. He went to the ED for possible stroke workup but was found to have count fingers visual acuity. He was evaluated by Dr. Ronak Perez and found to have bilateral vitreous hemorrhages.    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 No neovascularization of the disc. Microaneurysms   - left eye: neovascularization elsewhere, severe peripheral ischemia. (+) neovascularization of the disc. Microaneurysms     optos pic consistent with exam  optos Autofluorescence hypoautofluorescence of the hemes    Optical Coherence Tomography 05/30/19   - RE: vitreous opacities; rare cystic changes    - LE: Epiretinal membrane  Extramacular Optical Coherence Tomography showed tractional retinal detachment about 4DD inferior to the macula and another Tractional retinal detachment supero-temporal quadrant left eye     Assessment and Plan  1. Proliferative diabetic retinopathy with vitreous hemorrhages, both eyes  Localized outside macula Tractional retinal detachment in left eye  - Avastin 1.25mg injection today in both eyes   - Counseled extensively on risks and retinal detachment, endophthalmitis symptoms and the importance of calling.    -  Plan for Pan-retinal photocoagulation in both eyes next week  - Urgent vitrectomy not necessary at this point for left eye but will reassess at next visit. his cardiorespiratory comorbidities make general  anesthesia very high risk.     2. Nuclear sclerotic and cortical cataracts, both eyes  - will need cataract evaluation     3. Epiretinal membrane left eye   - will consider surgery in the future    Giovanny Moseley, medical student year 4    ~~~~~~~~~~~~~~~~~~~~~~~~~~~~~~~~~~   Complete documentation of historical and exam elements from today's encounter can be found in the full encounter summary report (not reduplicated in this progress note).  I personally obtained the chief complaint(s) and history of present illness.  I confirmed and edited as necessary the review of systems, past medical/surgical history, family history, social history, and examination findings as documented by others; and I examined the patient myself.  I personally reviewed the relevant tests, images, and reports as documented above.  I formulated and edited as necessary the assessment and plan and discussed the findings and management plan with the patient and family and No resident or fellow assisted with the procedures performed.  I performed the procedures myself.    Triny Bunch MD   of Ophthalmology.  Retina Service   Department of Ophthalmology and Visual Neurosciences   [facility]  Phone: [phone]   Fax: [phone]

## 2019-05-30 NOTE — NURSING NOTE
Chief Complaints and History of Present Illnesses   Patient presents with     Blurred Vision Follow-Up     Chief Complaint(s) and History of Present Illness(es)     Blurred Vision Follow-Up               Comments     Lucian VILA Tee Henderson Sr. is a 63 year old male who presents today for    1. Bilateral vitreous and retinal hemorrhages, Right>Left Eye  2. Cataracts, Both Eyes      No significant improvement in vision since the last visit.     Mina GRIJALVA 2:24 PM May 30, 2019

## 2019-06-04 RX ORDER — ATORVASTATIN CALCIUM 40 MG/1
40 TABLET, FILM COATED ORAL DAILY
Qty: 90 TABLET | Refills: 3 | Status: SHIPPED | OUTPATIENT
Start: 2019-06-04 | End: 2019-09-04

## 2019-06-05 ENCOUNTER — OFFICE VISIT (OUTPATIENT)
Dept: OPHTHALMOLOGY | Facility: CLINIC | Age: 64
End: 2019-06-05
Attending: OPHTHALMOLOGY
Payer: COMMERCIAL

## 2019-06-05 DIAGNOSIS — E11.3513 TYPE 2 DIABETES MELLITUS WITH PROLIFERATIVE RETINOPATHY OF BOTH EYES AND MACULAR EDEMA (H): Primary | ICD-10-CM

## 2019-06-05 DIAGNOSIS — E11.3513 TYPE 2 DIABETES MELLITUS WITH PROLIFERATIVE RETINOPATHY OF BOTH EYES AND MACULAR EDEMA (H): ICD-10-CM

## 2019-06-05 DIAGNOSIS — Z98.890 POSTOPERATIVE EYE STATE: ICD-10-CM

## 2019-06-05 PROCEDURE — 25000125 ZZHC RX 250: Mod: ZF | Performed by: OPHTHALMOLOGY

## 2019-06-05 PROCEDURE — G0463 HOSPITAL OUTPT CLINIC VISIT: HCPCS | Mod: ZF

## 2019-06-05 PROCEDURE — 67228 TREATMENT X10SV RETINOPATHY: CPT | Mod: LT,ZF | Performed by: OPHTHALMOLOGY

## 2019-06-05 RX ORDER — ERYTHROMYCIN 5 MG/G
OINTMENT OPHTHALMIC ONCE
Status: COMPLETED | OUTPATIENT
Start: 2019-06-05 | End: 2019-06-05

## 2019-06-05 RX ORDER — LIDOCAINE HYDROCHLORIDE 20 MG/ML
10 INJECTION, SOLUTION EPIDURAL; INFILTRATION; INTRACAUDAL; PERINEURAL ONCE
Status: DISCONTINUED | OUTPATIENT
Start: 2019-06-05 | End: 2020-08-03

## 2019-06-05 RX ADMIN — ERYTHROMYCIN 0.25 INCH: 5 OINTMENT OPHTHALMIC at 13:57

## 2019-06-05 ASSESSMENT — TONOMETRY
OD_IOP_MMHG: 11
IOP_METHOD: TONOPEN
OS_IOP_MMHG: 08

## 2019-06-05 ASSESSMENT — VISUAL ACUITY
OD_SC+: -2
OD_SC: 20/40
OS_SC: 20/70
METHOD: SNELLEN - LINEAR

## 2019-06-05 ASSESSMENT — CONF VISUAL FIELD
OS_NORMAL: 1
OD_NORMAL: 1

## 2019-06-05 NOTE — NURSING NOTE
Chief Complaints and History of Present Illnesses   Patient presents with     Diabetic Retinopathy Follow Up     Chief Complaint(s) and History of Present Illness(es)     Diabetic Retinopathy Follow Up     Laterality: both eyes    Onset: 1 week ago              Comments     Pt. States that there has been no change in VA BE.  No pain BE.  Concha Garber COT 12:58 PM June 5, 2019

## 2019-06-05 NOTE — PROGRESS NOTES
CC: Procedure only visit    HPI: No significant changes in vision, no flashes and floaters    Plan  risks, benefits and alternatives to treatment discussed, all questions answered, patient opted for treatment, procedure done in clinic without complication.    Panretinal photocoagulation done left eye. See procedure note.      Peribulbar anesthesia was used. An eye patch was placed over the eye after procedure     Pt had diffuse subconjunctival hemorrhage after block. IOP was normal, eye was soft and nerve was perfused. Will recheck tomorrow to ensure there is no retrobulbar component    follow up 1 day with dilated fundus exam left eye     Post-procedure instructions provided.        Alexey Girard MD, PhD  Vitreoretinal Surgery Fellow    ~~~~~~~~~~~~~~~~~~~~~~~~~~~~~~~~~~   Complete documentation of historical and exam elements from today's encounter can be found in the full encounter summary report (not reduplicated in this progress note).  I personally obtained the chief complaint(s) and history of present illness.  I confirmed and edited as necessary the review of systems, past medical/surgical history, family history, social history, and examination findings as documented by others; and I examined the patient myself.  I personally reviewed the relevant tests, images, and reports as documented above.  I formulated and edited as necessary the assessment and plan and discussed the findings and management plan with the patient and family and I was present for critical portions of the procedure carried out by the resident/fellow and immediately available for the entire procedure    Triny Bunch MD   of Ophthalmology.  Retina Service   Department of Ophthalmology and Visual Neurosciences   [facility]  Phone: [phone]   Fax: [phone]

## 2019-06-06 ENCOUNTER — OFFICE VISIT (OUTPATIENT)
Dept: OPHTHALMOLOGY | Facility: CLINIC | Age: 64
End: 2019-06-06
Attending: OPHTHALMOLOGY
Payer: COMMERCIAL

## 2019-06-06 DIAGNOSIS — Z48.810 AFTERCARE FOLLOWING SURGERY OF A SENSORY ORGAN: Primary | ICD-10-CM

## 2019-06-06 PROCEDURE — 40000269 ZZH STATISTIC NO CHARGE FACILITY FEE: Mod: ZF | Performed by: OPHTHALMOLOGY

## 2019-06-06 PROCEDURE — T1013 SIGN LANG/ORAL INTERPRETER: HCPCS | Mod: U3,ZF

## 2019-06-06 ASSESSMENT — CUP TO DISC RATIO
OS_RATIO: 0.2
OD_RATIO: 0.2

## 2019-06-06 ASSESSMENT — EXTERNAL EXAM - LEFT EYE: OS_EXAM: NORMAL

## 2019-06-06 ASSESSMENT — VISUAL ACUITY
METHOD: SNELLEN - LINEAR
OS_SC: 20/80
OD_SC: 20/60

## 2019-06-06 ASSESSMENT — SLIT LAMP EXAM - LIDS
COMMENTS: PTOSIS OD>OS
COMMENTS: PTOSIS OD>OS

## 2019-06-06 ASSESSMENT — TONOMETRY: OS_IOP_MMHG: 19

## 2019-06-06 ASSESSMENT — EXTERNAL EXAM - RIGHT EYE: OD_EXAM: NORMAL

## 2019-06-06 NOTE — PROGRESS NOTES
CC: post-laser  HPI: No significant changes in vision, no flashes and floaters  subconj heme  Good intraocular pressure and VA unchanged, soft eye and nerve was perfused.     follow up 1 week dilated fundus exam both eyes and Panretinal laser photocoagulation (PRP) laser right eye   E-mycin oint twice a day x 5 days     Post-procedure instructions provided.      ~~~~~~~~~~~~~~~~~~~~~~~~~~~~~~~~~~   Complete documentation of historical and exam elements from today's encounter can be found in the full encounter summary report (not reduplicated in this progress note).  I personally obtained the chief complaint(s) and history of present illness.  I confirmed and edited as necessary the review of systems, past medical/surgical history, family history, social history, and examination findings as documented by others; and I examined the patient myself.  I personally reviewed the relevant tests, images, and reports as documented above.  I formulated and edited as necessary the assessment and plan and discussed the findings and management plan with the patient and family    Triny Bunch MD   of Ophthalmology.  Retina Service   Department of Ophthalmology and Visual Neurosciences   [facility]  Phone: [phone]   Fax: [phone]

## 2019-06-09 ENCOUNTER — NURSE TRIAGE (OUTPATIENT)
Dept: NURSING | Facility: CLINIC | Age: 64
End: 2019-06-09

## 2019-06-09 ENCOUNTER — NURSE TRIAGE (OUTPATIENT)
Dept: CARDIOLOGY | Facility: CLINIC | Age: 64
End: 2019-06-09

## 2019-06-09 NOTE — TELEPHONE ENCOUNTER
Caller stacie states that  he has appointments scheduled at    For tomorrow , Tuesday and Wednesday  But he is unable to go to them because he has had eye surgery  and  cannot see to drive; he also has  eye appointments that conflict with these appointments   review of EMR reveals he has multiple imaging procedures schedlued on Monday which [facility] was able to cancel; unable to determine the rest of his appointment for the  Week and will route this information to    Care team   Stacey Anglin RN  [facility]

## 2019-06-09 NOTE — TELEPHONE ENCOUNTER
Caller a states that  he has appointments scheduled at    For tomorrow , Tuesday and Wednesday  But he is unable to go to them because he has had eye surgery  and  cannot see to drive; he also has  eye appointments that conflict with these appointments   review of EMR reveals he has multiple imaging procedures schedlued on Monday which [facility] was able to cancel; unable to determine the rest of his appointment for the  Week and will route this information to    Care team   Stacey Anglin RN  [facility]      Reason for Disposition    [1] Follow-up call to recent contact AND [2] information only call, no triage required    Protocols used: INFORMATION ONLY CALL-A-

## 2019-06-10 ENCOUNTER — TELEPHONE (OUTPATIENT)
Dept: TRANSPLANT | Facility: CLINIC | Age: 64
End: 2019-06-10

## 2019-06-10 NOTE — TELEPHONE ENCOUNTER
Patient Call: General    Reason for call: patient called with  services to cancel all his SOT and heart classes due to him not have transportation and also having an eye procedure on 6/12/19. Patient will call the SOT office back to reschedule.    Call back needed? If needed

## 2019-06-10 NOTE — TELEPHONE ENCOUNTER
"Cancel tx eval appts:  Pt called to cancel his tx eval appts.  He states he has had eye surgery and his eye \"is still bleeding\" so he can't drive himself to his appts.  He also has a f/u eye procedure on 6/12.  Called pt back with the assistance of , Briana, to instruct pt to call the Transplant Office back as soon as he gets transportation arranged, so we can re-schedule his appts.  He was informed that it will take a minimum of a week to get these appts re-scheduled, so he should call as soon as he lines up transportation so we can prevent further delays.  The pt verbalized understanding.  Dr Sheridan will be informed as well.  "

## 2019-06-11 ENCOUNTER — ANCILLARY PROCEDURE (OUTPATIENT)
Dept: CARDIOLOGY | Facility: CLINIC | Age: 64
End: 2019-06-11
Attending: INTERNAL MEDICINE
Payer: COMMERCIAL

## 2019-06-11 DIAGNOSIS — I42.9 CARDIOMYOPATHY (H): ICD-10-CM

## 2019-06-11 PROCEDURE — 93295 DEV INTERROG REMOTE 1/2/MLT: CPT | Performed by: INTERNAL MEDICINE

## 2019-06-11 PROCEDURE — 93296 REM INTERROG EVL PM/IDS: CPT | Mod: ZF

## 2019-06-12 ENCOUNTER — OFFICE VISIT (OUTPATIENT)
Dept: OPHTHALMOLOGY | Facility: CLINIC | Age: 64
End: 2019-06-12
Attending: OPHTHALMOLOGY
Payer: COMMERCIAL

## 2019-06-12 DIAGNOSIS — Z79.4 TYPE 2 DIABETES MELLITUS WITH BOTH EYES AFFECTED BY PROLIFERATIVE RETINOPATHY AND MACULAR EDEMA, WITH LONG-TERM CURRENT USE OF INSULIN (H): Primary | ICD-10-CM

## 2019-06-12 DIAGNOSIS — E11.3513 TYPE 2 DIABETES MELLITUS WITH BOTH EYES AFFECTED BY PROLIFERATIVE RETINOPATHY AND MACULAR EDEMA, WITH LONG-TERM CURRENT USE OF INSULIN (H): Primary | ICD-10-CM

## 2019-06-12 PROCEDURE — G0463 HOSPITAL OUTPT CLINIC VISIT: HCPCS | Mod: ZF,25

## 2019-06-12 PROCEDURE — 67228 TREATMENT X10SV RETINOPATHY: CPT | Mod: RT,ZF | Performed by: OPHTHALMOLOGY

## 2019-06-12 PROCEDURE — T1013 SIGN LANG/ORAL INTERPRETER: HCPCS | Mod: U3,ZF

## 2019-06-12 RX ORDER — NEOMYCIN SULFATE, POLYMYXIN B SULFATE, AND DEXAMETHASONE 3.5; 10000; 1 MG/G; [USP'U]/G; MG/G
0.5 OINTMENT OPHTHALMIC AT BEDTIME
Qty: 1 TUBE | Refills: 3 | Status: SHIPPED | OUTPATIENT
Start: 2019-06-12 | End: 2020-02-06

## 2019-06-12 RX ORDER — ERYTHROMYCIN 5 MG/G
OINTMENT OPHTHALMIC 3 TIMES DAILY
Status: DISCONTINUED | OUTPATIENT
Start: 2019-06-12 | End: 2020-08-03

## 2019-06-12 ASSESSMENT — VISUAL ACUITY
OD_SC: 20/50
METHOD: SNELLEN - LINEAR
OD_SC+: -1
OS_SC: 20/80

## 2019-06-12 ASSESSMENT — EXTERNAL EXAM - LEFT EYE: OS_EXAM: NORMAL

## 2019-06-12 ASSESSMENT — EXTERNAL EXAM - RIGHT EYE: OD_EXAM: NORMAL

## 2019-06-12 ASSESSMENT — SLIT LAMP EXAM - LIDS
COMMENTS: PTOSIS OD>OS
COMMENTS: PTOSIS OD>OS

## 2019-06-12 ASSESSMENT — CUP TO DISC RATIO
OS_RATIO: 0.2
OD_RATIO: 0.2

## 2019-06-12 ASSESSMENT — TONOMETRY
OS_IOP_MMHG: 09
IOP_METHOD: TONOPEN
OD_IOP_MMHG: 17

## 2019-06-12 NOTE — NURSING NOTE
Chief Complaints and History of Present Illnesses   Patient presents with     Post Op (Ophthalmology) Left Eye     Chief Complaint(s) and History of Present Illness(es)     Post Op (Ophthalmology) Left Eye     Laterality: left eye    Onset: gradual    Onset: weeks ago    Quality: States va is the same since last visit      Frequency: constantly    Associated symptoms: floaters.  Negative for flashes    Pain scale: 0/10              Comments     post laser left eye  Robyn Gabriel COT 3:12 PM June 12, 2019

## 2019-06-12 NOTE — PROGRESS NOTES
CC: s/p PRP left eye 6/5/29     Interval Update: reports doing better, no interval changes, still has busing around left eye after PRP (block)     HPI: Two weeks ago, he woke up and suddenly had blurred vision in both eyes. He went to the ED for possible stroke workup but was found to have count fingers visual acuity. He was evaluated by Dr. Ronak Perez and found to have bilateral vitreous hemorrhages.    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 No neovascularization of the disc. Microaneurysms   - left eye: neovascularization elsewhere, severe peripheral ischemia. (+) neovascularization of the disc. Microaneurysms     optos pic consistent with exam  optos Autofluorescence hypoautofluorescence of the hemes    Optical Coherence Tomography 05/30/19   - RE: vitreous opacities; rare cystic changes    - LE: Epiretinal membrane  Extramacular Optical Coherence Tomography showed tractional retinal detachment about 4DD inferior to the macula and another Tractional retinal detachment supero-temporal quadrant left eye     Assessment and Plan  1. Proliferative diabetic retinopathy with vitreous hemorrhages, both eyes   S/p PRP left eye on 6/5/19    S/p Avastin both eyes 5/30/19     - Localized outside macula Tractional retinal detachment in left eye  - Urgent vitrectomy not necessary at this point for left eye but will reassess at intervals given his cardiorespiratory comorbidities make general anesthesia very high risk.   -  Plan for Pan-retinal photocoagulation in right eye  today.    2. Nuclear sclerotic and cortical cataracts, both eyes  - will need cataract evaluation     3. Epiretinal membrane left eye   - will consider surgery in the future    return to clinic: 3 weeks for avastin inj both eyes     Roscoe Pereira MD  Ophthalmology Resident, PGY-3  [facility]      ~~~~~~~~~~~~~~~~~~~~~~~~~~~~~~~~~~   Complete documentation of historical and exam elements from today's encounter can be found in the full encounter summary report (not reduplicated in this progress note).  I personally obtained the chief complaint(s) and history of present illness.  I confirmed and edited as necessary the review of systems, past medical/surgical history, family history, social history, and examination findings as documented by others; and I examined the patient myself.  I personally reviewed the relevant tests, images, and reports as documented above.  I formulated and edited as necessary the assessment and plan and discussed the findings and management plan with the patient and family and No resident or fellow assisted with the procedures performed.  I performed the procedures myself.    Triny Bunch MD   of Ophthalmology.  Retina Service   Department of Ophthalmology and Visual Neurosciences   [facility]  Phone: [phone]   Fax: [phone]

## 2019-06-13 ENCOUNTER — TELEPHONE (OUTPATIENT)
Dept: TRANSPLANT | Facility: CLINIC | Age: 64
End: 2019-06-13

## 2019-06-13 NOTE — TELEPHONE ENCOUNTER
Provider Call: General    Reason for call: Debora FINLEY from [facility] called to Collaborate Heart Failure care for the patient and is needing information.    Call back needed? Yes    Return Call Needed  Same as documented in contacts section  When to return call?: Same day: Route High Priority

## 2019-06-16 DIAGNOSIS — I25.739 CORONARY ARTERY DISEASE INVOLVING NONAUTOLOGOUS BIOLOGICAL CORONARY BYPASS GRAFT WITH ANGINA PECTORIS (H): ICD-10-CM

## 2019-06-17 DIAGNOSIS — E11.3513 TYPE 2 DIABETES MELLITUS WITH PROLIFERATIVE RETINOPATHY OF BOTH EYES AND MACULAR EDEMA, UNSPECIFIED WHETHER LONG TERM INSULIN USE (H): Primary | ICD-10-CM

## 2019-06-18 RX ORDER — CLOPIDOGREL BISULFATE 75 MG/1
75 TABLET ORAL DAILY
Qty: 90 TABLET | Refills: 3 | Status: ON HOLD | OUTPATIENT
Start: 2019-06-18 | End: 2020-08-03

## 2019-06-24 ENCOUNTER — TELEPHONE (OUTPATIENT)
Dept: TRANSPLANT | Facility: CLINIC | Age: 64
End: 2019-06-24

## 2019-06-24 NOTE — TELEPHONE ENCOUNTER
Re-schedule Transplant Evaluation:  Last week, I got a message to call Mr Tee Henderson's daughter, Elisabeth.  She explained that her dad is afraid of the heart transplant surgery and the heart pump.  I explained that part of the evaluation process is to make sure he has all the information he needs when considering whether he wants to go through with the surgery (s), and that we won't try to talk him in to doing something that he doesn't want.   She requested that I call her father back and explain this, which might make him less frightened to go through with the evaluation.    Today when I spoke to Lucian by phone with the , he explained that he has his last eye surgery on 7/3, and that he would be willing to get his hybrid eval. Re-scheduled after that.  We will plan to start the eval the week of 7/8, but Lucian knows to call the Transplant Office if he has questions in the interim.  Her verified that he will invite his wife to accompany him to these appts, so we can address her questions and concerns as well.

## 2019-06-24 NOTE — Clinical Note
MR Lucian Henderson will have his last eye surgery on 7/3, so will be ready to start his eval the following week - 7/8.  He will need a .  Let me know if you can't access his previous orders and schedule.  Thanks!Bill

## 2019-06-24 NOTE — LETTER
PRE HEART TRANSPLANT EVALUATION/CLINIC APPOINTMENTS    Patient: Lucian Henderson Sr.  MR#: [id number]  Coordinator: Bill [phone]  : Rosalia [phone]  Dates: July 8, 2019 & July 9, 2019, July 10, 2019   July 11, 2019, July 17, 2019 & July 18, 2019 July 29, 2019  This is your evaluation schedule, please follow dates and times.  You will receive reminder phone calls for other tests, but please follow this schedule only!  If you have any questions about dates and times, please call us on number listed above.  Thank you, Transplant Clinic       No calcium 24 hours before DEXA scan    No caffeine for 12 hours before Abdominal Ultrasound    Nothing to eat or drink after midnight on Sunday    Day/Date:  Monday, July 8, 2019  Time Location Activity   9:00 AM [facility]  Imaging and Lab Testing  1st floor Dexa Scan  (No calcium 24 hours before test.)   9:30 AM [facility]  Imaging and Lab Testing  1st floor Chest X-ray   9:45 AM [facility]  [address]; [facility] [address]  Imaging and Lab Testing  1st floor Blood tests & 2nd ABO draw  (Nothing to eat or drink after midnight)   10:30 AM [facility]  Imaging and Lab Testing  1st floor SIENNA Doppler Ultrasound, No Exercise  (No caffeine or tobacco for 1 hour prior to exam.)   11:00 AM [facility]  Imaging and Lab Testing  1st floor Bilateral Carotid Ultrasound  (You do not need to do anything special to prepare for your exam.)   12:00 PM [facility]  Imaging and Lab Testing  1st floor Bilateral Lower Extremity Arterial Ultrasound  (You do not need to do anything special to prepare for your exam.)       Day/Date:  Tuesday, July 9, 2019  Time Location Activity   6:15 AM [facility]  Imaging and Lab Testing  1st floor Abdominal Ultrasound  (Nothing to eat or drink after midnight)   7:00AM [facility]  Imaging and Lab Testing  1st floor  Chest/Abdomen/Pelvis CT  (Do not eat or drink for 2 hours before your exam.)   7:20 AM [facility]  Imaging and Lab Testing  1st floor Head CT  (You do not need to do anything special to prepare for your exam.)   8:00 AM [facility]  Pulmonary Function Lab  3rd floor Pulmonary Function Test  (Please do not wear any perfume, deodorant or scented products)   9:00 AM [facility]  Transplant Clinic  3rd floor; Clinic 3A Appointment with Niesha Nicohls,  Nutritionist   10:00 AM [facility]  Heart Care Clinic  3rd floor; Clinic 3L; Room 3.81 Appointment with Quyen Turcios (JULITO-CHENG) or Addis Jenkins (L-Z),  Transplant    11:00 AM- 1:00 PM [facility]  Transplant Clinic  3rd floor; Clinic 3A; Room 3.81  Transplant teaching; Bill Rosado, RN or Kadi Saldivar RN, Transplant Coordinator   1:00 PM [facility]  Heart Care Clinic  3rd floor; Clinic 3L; Room 3.81 LVAD teaching Show and Tell           Day/Date:  Wednesday, July 10, 2019  Time Location Activity   1:00 PM [facility]   Gold Waiting Room  2nd floor Right Heart Cath  (do not eat or drink for three hours before test)           Day/Date:  Thursday, July 11, 2019  Time Location Activity   12:00 PM-  1:00 PM   [facility]  Boardroom of the [facility] [facility]  Room 8-106  8th floor Transplant Support Group               Day/Date:  Wednesday, July 17, 2019  Time Location Activity   2:45 PM [facility]  Palliative Care Clinic  [facility]  2nd floor; Clinic 2C Dr. Bueno, Palliative Care          Day/Date:  Thursday, July 18, 2019  Time Location Activity   12:30 PM-  3:30 PM [facility]  Neurosciences Services  3rd floor Appointment with Chauncey Livingston, Ph.D., Neuropsychologist         Day/Date:  Monday, July 29, 2019  Time Location Activity   4:00 PM [facility]  Heart Care Clinic  3rd floor;  Clinic 3L Appointment with  ,  Cardiovascular Surgeon         Day/Date:  Every Tuesday  Time Location Activity   1:00 PM-  2:00 PM   [facility]  Room 7-120  7th floor VAD Support Group  ALWAYS ON TUESDAYS

## 2019-06-28 LAB
MDC_IDC_EPISODE_DTM: NORMAL
MDC_IDC_EPISODE_DURATION: 12 S
MDC_IDC_EPISODE_DURATION: 14 S
MDC_IDC_EPISODE_DURATION: 17 S
MDC_IDC_EPISODE_DURATION: 6 S
MDC_IDC_EPISODE_DURATION: 7 S
MDC_IDC_EPISODE_DURATION: 7 S
MDC_IDC_EPISODE_ID: NORMAL
MDC_IDC_EPISODE_TYPE: NORMAL
MDC_IDC_LEAD_IMPLANT_DT: NORMAL
MDC_IDC_LEAD_LOCATION: NORMAL
MDC_IDC_LEAD_LOCATION_DETAIL_1: NORMAL
MDC_IDC_LEAD_MFG: NORMAL
MDC_IDC_LEAD_MODEL: NORMAL
MDC_IDC_LEAD_POLARITY_TYPE: NORMAL
MDC_IDC_LEAD_SERIAL: NORMAL
MDC_IDC_MSMT_BATTERY_DTM: NORMAL
MDC_IDC_MSMT_BATTERY_REMAINING_LONGEVITY: 114 MO
MDC_IDC_MSMT_BATTERY_REMAINING_PERCENTAGE: 100 %
MDC_IDC_MSMT_BATTERY_STATUS: NORMAL
MDC_IDC_MSMT_CAP_CHARGE_DTM: NORMAL
MDC_IDC_MSMT_CAP_CHARGE_TIME: 10.3 S
MDC_IDC_MSMT_CAP_CHARGE_TYPE: NORMAL
MDC_IDC_MSMT_LEADCHNL_RV_IMPEDANCE_VALUE: 554 OHM
MDC_IDC_MSMT_LEADCHNL_RV_PACING_THRESHOLD_AMPLITUDE: 0.5 V
MDC_IDC_MSMT_LEADCHNL_RV_PACING_THRESHOLD_PULSEWIDTH: 0.5 MS
MDC_IDC_PG_IMPLANT_DTM: NORMAL
MDC_IDC_PG_MFG: NORMAL
MDC_IDC_PG_MODEL: NORMAL
MDC_IDC_PG_SERIAL: NORMAL
MDC_IDC_PG_TYPE: NORMAL
MDC_IDC_SESS_CLINIC_NAME: NORMAL
MDC_IDC_SESS_DTM: NORMAL
MDC_IDC_SESS_TYPE: NORMAL
MDC_IDC_SET_BRADY_LOWRATE: 40 {BEATS}/MIN
MDC_IDC_SET_BRADY_MODE: NORMAL
MDC_IDC_SET_LEADCHNL_RV_PACING_AMPLITUDE: 2.4 V
MDC_IDC_SET_LEADCHNL_RV_PACING_POLARITY: NORMAL
MDC_IDC_SET_LEADCHNL_RV_PACING_PULSEWIDTH: 0.5 MS
MDC_IDC_SET_LEADCHNL_RV_SENSING_ADAPTATION_MODE: NORMAL
MDC_IDC_SET_LEADCHNL_RV_SENSING_POLARITY: NORMAL
MDC_IDC_SET_LEADCHNL_RV_SENSING_SENSITIVITY: 0.6 MV
MDC_IDC_SET_ZONE_DETECTION_INTERVAL: 240 MS
MDC_IDC_SET_ZONE_DETECTION_INTERVAL: 300 MS
MDC_IDC_SET_ZONE_DETECTION_INTERVAL: 400 MS
MDC_IDC_SET_ZONE_TYPE: NORMAL
MDC_IDC_SET_ZONE_VENDOR_TYPE: NORMAL
MDC_IDC_STAT_BRADY_DTM_END: NORMAL
MDC_IDC_STAT_BRADY_DTM_START: NORMAL
MDC_IDC_STAT_BRADY_RV_PERCENT_PACED: 0 %
MDC_IDC_STAT_EPISODE_RECENT_COUNT: 0
MDC_IDC_STAT_EPISODE_RECENT_COUNT: 25
MDC_IDC_STAT_EPISODE_RECENT_COUNT_DTM_END: NORMAL
MDC_IDC_STAT_EPISODE_RECENT_COUNT_DTM_START: NORMAL
MDC_IDC_STAT_EPISODE_TYPE: NORMAL
MDC_IDC_STAT_EPISODE_VENDOR_TYPE: NORMAL
MDC_IDC_STAT_TACHYTHERAPY_ATP_DELIVERED_RECENT: 0
MDC_IDC_STAT_TACHYTHERAPY_ATP_DELIVERED_TOTAL: 0
MDC_IDC_STAT_TACHYTHERAPY_RECENT_DTM_END: NORMAL
MDC_IDC_STAT_TACHYTHERAPY_RECENT_DTM_START: NORMAL
MDC_IDC_STAT_TACHYTHERAPY_SHOCKS_ABORTED_RECENT: 0
MDC_IDC_STAT_TACHYTHERAPY_SHOCKS_ABORTED_TOTAL: 0
MDC_IDC_STAT_TACHYTHERAPY_SHOCKS_DELIVERED_RECENT: 0
MDC_IDC_STAT_TACHYTHERAPY_SHOCKS_DELIVERED_TOTAL: 0
MDC_IDC_STAT_TACHYTHERAPY_TOTAL_DTM_END: NORMAL
MDC_IDC_STAT_TACHYTHERAPY_TOTAL_DTM_START: NORMAL

## 2019-07-02 NOTE — TELEPHONE ENCOUNTER
Spoke with the patient and confirmed Evals: Pre-Heart Eval appointments on 7/8/19, 7/9/19, 7/10/19, 7/11/19, 7/17/19, 7/18/19 and 7/29/19.  Informed patient an itinerary can be accessed via [employer], and will be sent via [facility].

## 2019-07-03 ENCOUNTER — OFFICE VISIT (OUTPATIENT)
Dept: OPHTHALMOLOGY | Facility: CLINIC | Age: 64
End: 2019-07-03
Attending: OPHTHALMOLOGY
Payer: COMMERCIAL

## 2019-07-03 DIAGNOSIS — E11.3513 TYPE 2 DIABETES MELLITUS WITH PROLIFERATIVE RETINOPATHY OF BOTH EYES AND MACULAR EDEMA, UNSPECIFIED WHETHER LONG TERM INSULIN USE (H): ICD-10-CM

## 2019-07-03 PROCEDURE — 25000128 H RX IP 250 OP 636: Mod: ZF | Performed by: OPHTHALMOLOGY

## 2019-07-03 PROCEDURE — 92134 CPTRZ OPH DX IMG PST SGM RTA: CPT | Mod: ZF | Performed by: OPHTHALMOLOGY

## 2019-07-03 PROCEDURE — C9257 BEVACIZUMAB INJECTION: HCPCS | Mod: ZF | Performed by: OPHTHALMOLOGY

## 2019-07-03 PROCEDURE — 67028 INJECTION EYE DRUG: CPT | Mod: RT,ZF | Performed by: OPHTHALMOLOGY

## 2019-07-03 PROCEDURE — G0463 HOSPITAL OUTPT CLINIC VISIT: HCPCS | Mod: ZF,25

## 2019-07-03 RX ADMIN — Medication 1.25 MG: at 16:40

## 2019-07-03 RX ADMIN — Medication 1.25 MG: at 16:41

## 2019-07-03 ASSESSMENT — CONF VISUAL FIELD
METHOD: COUNTING FINGERS
OD_NORMAL: 1
OS_NORMAL: 1

## 2019-07-03 ASSESSMENT — VISUAL ACUITY
OD_SC+: -1
METHOD: SNELLEN - LINEAR
OD_SC: 20/60
OS_SC: 20/70

## 2019-07-03 ASSESSMENT — CUP TO DISC RATIO
OD_RATIO: 0.2
OS_RATIO: 0.2

## 2019-07-03 ASSESSMENT — TONOMETRY
IOP_METHOD: TONOPEN
OD_IOP_MMHG: 15
OS_IOP_MMHG: 08

## 2019-07-03 ASSESSMENT — EXTERNAL EXAM - RIGHT EYE: OD_EXAM: NORMAL

## 2019-07-03 ASSESSMENT — EXTERNAL EXAM - LEFT EYE: OS_EXAM: NORMAL

## 2019-07-03 ASSESSMENT — SLIT LAMP EXAM - LIDS
COMMENTS: PTOSIS OD>OS
COMMENTS: PTOSIS OD>OS

## 2019-07-03 NOTE — NURSING NOTE
Chief Complaint(s) and History of Present Illness(es)     Follow Up     In both eyes.  Associated symptoms include eye pain (Pt notes some intermittent eye pain in LE x 1-2 weeks).  Negative for dryness, redness, tearing, floaters and flashes.              Comments     Pt is here for a 3 week f/u for Proliferative diabetic retinopathy with vitreous hemorrhages, both eyes. Pt notes the vision is about the same since last visit each eye.    Theresa Murphy, COMT 3:11 PM July 3, 2019

## 2019-07-03 NOTE — PROGRESS NOTES
CC: s/p PRP left eye 6/5/29, PRP right eye 6/12/19    Interval Update: reports doing better, no interval changes. No distortion in vision each eye. No flashes, floaters, shadow/curtain over vision.     HPI: Two weeks prior to presentation to retina clinic, pt woke up and suddenly had blurred vision in both eyes. He went to the ED for possible stroke workup but was found to have count fingers visual acuity. He was evaluated by Dr. Ronak Perez and found to have bilateral vitreous hemorrhages.    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 No neovascularization of the disc. Microaneurysms   - left eye: neovascularization elsewhere, severe peripheral ischemia. (+) neovascularization of the disc. Microaneurysms     optos pic consistent with exam  optos Autofluorescence hypoautofluorescence of the hemes    Optical Coherence Tomography 07/3/19   - RE: vitreous opacities; rare cystic changes    - LE: Epiretinal membrane. Stable IRF.     Prior Extramacular Optical Coherence Tomography showed tractional retinal detachment about 4DD inferior to the macula and another Tractional retinal detachment supero-temporal quadrant left eye     Assessment and Plan  1. Proliferative diabetic retinopathy with vitreous hemorrhages, both eyes   S/p PRP left eye on 6/5/19    S/p PRP right eye on 6/12/19   S/p Avastin both eyes 5/30/19     - Localized outside macula Tractional retinal detachment in left eye  - Urgent vitrectomy not necessary at this point for left eye but will reassess at intervals given  his cardiorespiratory comorbidities make general anesthesia very high risk.     2. Nuclear sclerotic and cortical cataracts, both eyes  - will need cataract evaluation     3. Epiretinal membrane left eye   - will consider surgery in the future    return to clinic: 1 month for avastin inj both eyes - with Optical Coherence Tomography. Inf cuts of the macula left eye     Tacos Haq MD  Ophthalmology Resident, PGY-3  [facility]      ~~~~~~~~~~~~~~~~~~~~~~~~~~~~~~~~~~   Complete documentation of historical and exam elements from today's encounter can be found in the full encounter summary report (not reduplicated in this progress note).  I personally obtained the chief complaint(s) and history of present illness.  I confirmed and edited as necessary the review of systems, past medical/surgical history, family history, social history, and examination findings as documented by others; and I examined the patient myself.  I personally reviewed the relevant tests, images, and reports as documented above.  I personally reviewed the ophthalmic test(s) associated with this encounter, agree with the interpretation(s) as documented by the resident/fellow, and have edited the corresponding report(s) as necessary.   I formulated and edited as necessary the assessment and plan and discussed the findings and management plan with the patient and family and No resident or fellow assisted with the procedures performed.  I performed the procedures myself.    Triny Bunch MD   of Ophthalmology.  Retina Service   Department of Ophthalmology and Visual Neurosciences   [facility]  Phone: [phone]   Fax: [phone]

## 2019-07-08 ENCOUNTER — ANCILLARY PROCEDURE (OUTPATIENT)
Dept: ULTRASOUND IMAGING | Facility: CLINIC | Age: 64
End: 2019-07-08
Attending: INTERNAL MEDICINE
Payer: COMMERCIAL

## 2019-07-08 ENCOUNTER — ANCILLARY PROCEDURE (OUTPATIENT)
Dept: GENERAL RADIOLOGY | Facility: CLINIC | Age: 64
End: 2019-07-08
Attending: INTERNAL MEDICINE
Payer: COMMERCIAL

## 2019-07-08 ENCOUNTER — ANCILLARY PROCEDURE (OUTPATIENT)
Dept: BONE DENSITY | Facility: CLINIC | Age: 64
End: 2019-07-08
Attending: INTERNAL MEDICINE
Payer: COMMERCIAL

## 2019-07-08 DIAGNOSIS — I50.20 SYSTOLIC HEART FAILURE (H): ICD-10-CM

## 2019-07-08 LAB
ABO + RH BLD: NORMAL
ABO + RH BLD: NORMAL
ALBUMIN SERPL-MCNC: 3.4 G/DL (ref 3.4–5)
ALBUMIN UR-MCNC: 30 MG/DL
ALP SERPL-CCNC: 126 U/L (ref 40–150)
ALT SERPL W P-5'-P-CCNC: 20 U/L (ref 0–70)
ANION GAP SERPL CALCULATED.3IONS-SCNC: 3 MMOL/L (ref 3–14)
APPEARANCE UR: ABNORMAL
APTT PPP: 30 SEC (ref 22–37)
AST SERPL W P-5'-P-CCNC: 14 U/L (ref 0–45)
BACTERIA #/AREA URNS HPF: ABNORMAL /HPF
BASOPHILS # BLD AUTO: 0 10E9/L (ref 0–0.2)
BASOPHILS NFR BLD AUTO: 0.3 %
BILIRUB SERPL-MCNC: 0.6 MG/DL (ref 0.2–1.3)
BILIRUB UR QL STRIP: NEGATIVE
BLD GP AB SCN SERPL QL: NORMAL
BLOOD BANK CMNT PATIENT-IMP: NORMAL
BUN SERPL-MCNC: 30 MG/DL (ref 7–30)
CALCIUM SERPL-MCNC: 8.8 MG/DL (ref 8.5–10.1)
CARDIOLIPIN ANTIBODY IGG: <1.6 GPL-U/ML (ref 0–19.9)
CARDIOLIPIN ANTIBODY IGM: <0.2 MPL-U/ML (ref 0–19.9)
CHLORIDE SERPL-SCNC: 105 MMOL/L (ref 94–109)
CHOLEST SERPL-MCNC: 104 MG/DL
CMV IGG SERPL QL IA: >8 AI (ref 0–0.8)
CO2 SERPL-SCNC: 30 MMOL/L (ref 20–32)
COLOR UR AUTO: ABNORMAL
CREAT SERPL-MCNC: 2.08 MG/DL (ref 0.66–1.25)
CRP SERPL HS-MCNC: 9.9 MG/L
DIFFERENTIAL METHOD BLD: NORMAL
EBV VCA IGG SER QL IA: 2.4 AI (ref 0–0.8)
EOSINOPHIL # BLD AUTO: 0.3 10E9/L (ref 0–0.7)
EOSINOPHIL NFR BLD AUTO: 3.9 %
ERYTHROCYTE [DISTWIDTH] IN BLOOD BY AUTOMATED COUNT: 14.5 % (ref 10–15)
FERRITIN SERPL-MCNC: 156 NG/ML (ref 26–388)
GFR SERPL CREATININE-BSD FRML MDRD: 32 ML/MIN/{1.73_M2}
GLUCOSE SERPL-MCNC: 125 MG/DL (ref 70–99)
GLUCOSE UR STRIP-MCNC: NEGATIVE MG/DL
HBA1C MFR BLD: 7.9 % (ref 0–5.6)
HBV CORE AB SERPL QL IA: NONREACTIVE
HBV SURFACE AB SERPL IA-ACNC: 1.02 M[IU]/ML
HBV SURFACE AG SERPL QL IA: NONREACTIVE
HCT VFR BLD AUTO: 42.5 % (ref 40–53)
HCV AB SERPL QL IA: NONREACTIVE
HDLC SERPL-MCNC: 27 MG/DL
HGB BLD-MCNC: 13.4 G/DL (ref 13.3–17.7)
HGB FREE PLAS-MCNC: <30 MG/DL
HGB UR QL STRIP: ABNORMAL
HIV 1+2 AB+HIV1 P24 AG SERPL QL IA: NONREACTIVE
HSV1 IGG SERPL QL IA: >8 AI (ref 0–0.8)
HSV2 IGG SERPL QL IA: <0.2 AI (ref 0–0.8)
IMM GRANULOCYTES # BLD: 0 10E9/L (ref 0–0.4)
IMM GRANULOCYTES NFR BLD: 0.5 %
INR PPP: 1.05 (ref 0.86–1.14)
IRON SATN MFR SERPL: 20 % (ref 15–46)
IRON SERPL-MCNC: 49 UG/DL (ref 35–180)
KETONES UR STRIP-MCNC: NEGATIVE MG/DL
LDH SERPL L TO P-CCNC: 174 U/L (ref 85–227)
LDLC SERPL CALC-MCNC: 41 MG/DL
LEUKOCYTE ESTERASE UR QL STRIP: ABNORMAL
LYMPHOCYTES # BLD AUTO: 1.7 10E9/L (ref 0.8–5.3)
LYMPHOCYTES NFR BLD AUTO: 22.1 %
MAGNESIUM SERPL-MCNC: 2.1 MG/DL (ref 1.6–2.3)
MCH RBC QN AUTO: 30.1 PG (ref 26.5–33)
MCHC RBC AUTO-ENTMCNC: 31.5 G/DL (ref 31.5–36.5)
MCV RBC AUTO: 96 FL (ref 78–100)
MISCELLANEOUS TEST: NORMAL
MISCELLANEOUS TEST: NORMAL
MONOCYTES # BLD AUTO: 0.8 10E9/L (ref 0–1.3)
MONOCYTES NFR BLD AUTO: 9.7 %
NEUTROPHILS # BLD AUTO: 4.9 10E9/L (ref 1.6–8.3)
NEUTROPHILS NFR BLD AUTO: 63.5 %
NITRATE UR QL: NEGATIVE
NONHDLC SERPL-MCNC: 77 MG/DL
NRBC # BLD AUTO: 0 10*3/UL
NRBC BLD AUTO-RTO: 0 /100
PH UR STRIP: 6 PH (ref 5–7)
PHOSPHATE SERPL-MCNC: 3.3 MG/DL (ref 2.5–4.5)
PLATELET # BLD AUTO: 235 10E9/L (ref 150–450)
POTASSIUM SERPL-SCNC: 4.7 MMOL/L (ref 3.4–5.3)
PROT SERPL-MCNC: 7.5 G/DL (ref 6.8–8.8)
PSA SERPL-ACNC: 0.75 UG/L (ref 0–4)
RBC # BLD AUTO: 4.45 10E12/L (ref 4.4–5.9)
RBC #/AREA URNS AUTO: 4 /HPF (ref 0–2)
SODIUM SERPL-SCNC: 138 MMOL/L (ref 133–144)
SOURCE: ABNORMAL
SP GR UR STRIP: 1.01 (ref 1–1.03)
SPECIMEN EXP DATE BLD: NORMAL
T GONDII IGG SER QL: <3 IU/ML (ref 0–7.1)
T PALLIDUM AB SER QL: NONREACTIVE
TIBC SERPL-MCNC: 247 UG/DL (ref 240–430)
TRANSFERRIN SERPL-MCNC: 205 MG/DL (ref 210–360)
TRIGL SERPL-MCNC: 181 MG/DL
TSH SERPL DL<=0.005 MIU/L-ACNC: 1.3 MU/L (ref 0.4–4)
URATE SERPL-MCNC: 10 MG/DL (ref 3.5–7.2)
UROBILINOGEN UR STRIP-MCNC: 0 MG/DL (ref 0–2)
VIT B12 SERPL-MCNC: 258 PG/ML (ref 193–986)
VZV IGG SER QL IA: 7.6 AI (ref 0–0.8)
WBC # BLD AUTO: 7.7 10E9/L (ref 4–11)
WBC #/AREA URNS AUTO: >182 /HPF (ref 0–5)
WBC CLUMPS #/AREA URNS HPF: PRESENT /HPF

## 2019-07-09 ENCOUNTER — ALLIED HEALTH/NURSE VISIT (OUTPATIENT)
Dept: TRANSPLANT | Facility: CLINIC | Age: 64
End: 2019-07-09
Attending: INTERNAL MEDICINE
Payer: COMMERCIAL

## 2019-07-09 ENCOUNTER — ANCILLARY PROCEDURE (OUTPATIENT)
Dept: CT IMAGING | Facility: CLINIC | Age: 64
End: 2019-07-09
Attending: INTERNAL MEDICINE
Payer: COMMERCIAL

## 2019-07-09 ENCOUNTER — ALLIED HEALTH/NURSE VISIT (OUTPATIENT)
Dept: CARDIOLOGY | Facility: CLINIC | Age: 64
End: 2019-07-09
Attending: INTERNAL MEDICINE
Payer: COMMERCIAL

## 2019-07-09 ENCOUNTER — DOCUMENTATION ONLY (OUTPATIENT)
Dept: TRANSPLANT | Facility: CLINIC | Age: 64
End: 2019-07-09

## 2019-07-09 ENCOUNTER — ANCILLARY PROCEDURE (OUTPATIENT)
Dept: ULTRASOUND IMAGING | Facility: CLINIC | Age: 64
End: 2019-07-09
Attending: INTERNAL MEDICINE
Payer: COMMERCIAL

## 2019-07-09 VITALS — BODY MASS INDEX: 33.15 KG/M2 | HEIGHT: 65 IN | WEIGHT: 199 LBS

## 2019-07-09 DIAGNOSIS — I50.20 SYSTOLIC HEART FAILURE (H): ICD-10-CM

## 2019-07-09 DIAGNOSIS — I50.22 CHRONIC SYSTOLIC HEART FAILURE (H): Primary | ICD-10-CM

## 2019-07-09 DIAGNOSIS — Z76.82 HEART TRANSPLANT CANDIDATE: ICD-10-CM

## 2019-07-09 DIAGNOSIS — I50.20 SYSTOLIC HEART FAILURE (H): Primary | ICD-10-CM

## 2019-07-09 LAB
CELL TYPE AUTO: NORMAL
CHANNELSHIFTAUTOB1: -90
CHANNELSHIFTAUTOT1: -81
CROSSMATCHDATEAUTO: NORMAL
DLCOCOR-%PRED-PRE: 115 %
DLCOCOR-PRE: 25.42 ML/MIN/MMHG
DLCOUNC-%PRED-PRE: 111 %
DLCOUNC-PRE: 24.51 ML/MIN/MMHG
DLCOUNC-PRED: 22.07 ML/MIN/MMHG
DONOR AUTO: NORMAL
DONORCELLDATE AUTO: NORMAL
ERV-%PRED-PRE: 57 %
ERV-PRE: 0.22 L
ERV-PRED: 0.39 L
EXPTIME-PRE: 7.83 SEC
FEF2575-%PRED-POST: 150 %
FEF2575-%PRED-PRE: 90 %
FEF2575-POST: 3.32 L/SEC
FEF2575-PRE: 2 L/SEC
FEF2575-PRED: 2.21 L/SEC
FEFMAX-%PRED-PRE: 73 %
FEFMAX-PRE: 5.53 L/SEC
FEFMAX-PRED: 7.53 L/SEC
FEV1-%PRED-PRE: 71 %
FEV1-PRE: 1.85 L
FEV1FEV6-PRE: 83 %
FEV1FEV6-PRED: 78 %
FEV1FVC-PRE: 82 %
FEV1FVC-PRED: 79 %
FEV1SVC-PRE: 77 %
FEV1SVC-PRED: 68 %
FIFMAX-PRE: 3.13 L/SEC
FRCPLETH-%PRED-PRE: 81 %
FRCPLETH-PRE: 2.67 L
FRCPLETH-PRED: 3.3 L
FVC-%PRED-PRE: 68 %
FVC-PRE: 2.25 L
FVC-PRED: 3.3 L
GAMMA INTERFERON BACKGROUND BLD IA-ACNC: 0.05 IU/ML
IC-%PRED-PRE: 63 %
IC-PRE: 2.16 L
IC-PRED: 3.44 L
M TB IFN-G BLD-IMP: NEGATIVE
M TB IFN-G CD4+ BCKGRND COR BLD-ACNC: >10 IU/ML
MITOGEN IGNF BCKGRD COR BLD-ACNC: 0.03 IU/ML
MITOGEN IGNF BCKGRD COR BLD-ACNC: 0.04 IU/ML
POS CUT OFF AUTO B: >101
POS CUT OFF AUTO T: >67
RESULT AUTO B1: NORMAL
RESULT AUTO T1: NORMAL
RVPLETH-%PRED-PRE: 105 %
RVPLETH-PRE: 2.45 L
RVPLETH-PRED: 2.33 L
SERUM DATE AUTO B1: NORMAL
SERUM DATE AUTO T1: NORMAL
TESTMETHODAUTO: NORMAL
TLCPLETH-%PRED-PRE: 81 %
TLCPLETH-PRE: 4.84 L
TLCPLETH-PRED: 5.91 L
TREATMENT AUTO B1: NORMAL
TREATMENT AUTO T1: NORMAL
VA-%PRED-PRE: 75 %
VA-PRE: 3.94 L
VC-%PRED-PRE: 62 %
VC-PRE: 2.39 L
VC-PRED: 3.82 L

## 2019-07-09 PROCEDURE — T1013 SIGN LANG/ORAL INTERPRETER: HCPCS | Mod: U3,ZF

## 2019-07-09 PROCEDURE — 99207 ZZC NO CHARGE NURSE ONLY: CPT | Mod: ZP

## 2019-07-09 ASSESSMENT — MIFFLIN-ST. JEOR: SCORE: 1614.54

## 2019-07-09 NOTE — Clinical Note
MR Tee Henderson has never had a colonoscopy, and needs one prior to being listed for transplant.  His preference is to do it at [facility] at the next available appt.   He also needs to be seen with Dr Yomaira Holder's team in the [facility] clinic.Thanks!Bill

## 2019-07-09 NOTE — PROGRESS NOTES
Transplant Teaching:  Lucian is undergoing a heart transplant evaluation, and today I met with him, his wife, and a  to do transplant teaching.  We discussed the candidate selection process, insurance prior authorization, UNOS priority categories, the waiting period, donor issues including the possibility of a CDC Increased Risk donor, and the pre-, zander-, and post-op routines.  In addition, we reviewed the post-hospital clinic and biopsy routines, and the major potential risks of transplantation including rejection, infection, transplant vasculopathy, and malignancy.  Lucian was given a copy of the UNOS brochure on Multiple Waitlisting( Upper sorbian version), the [email or web address] tutorial which is only available in English, but might be useful to the pt's adult children who were not in attendance today, and the [facility] booklet including current program statistics a s published in Jan 2019.      According to Lucian's daughter, Elisabeth, he is somewhat mistrusting of medical personnel, and isn't sure he wants to pursue advanced treatment options.  He and his wife were reassured that we will not talk him into doing something he is not comfortable doing, but that we want to make sure he has enough information to make an informed decision.  He appreciates knowing this, and knows to contact us if he should have questions or concerns in the future.

## 2019-07-09 NOTE — PROGRESS NOTES
Patient of Dr. Sheridan seen in clinic for psychosocial assessment.   90 minutes spent with patient. 100% of visit consisted of counseling related to issues surrounding LVAD and Heart Transplant.    Psychosocial Assessment   Name: Lucian Henderson Sr.     MRN:  [id number]        Patient Name / Age / Race: Lucian Henderson Sr. 63 year old    Source of Information: Patient and his wife, Shivani   : Quyen Turcios   Interview Date: July 9, 2019   Reason for Referral: Heart Transplant and Mechanical Circulatory Support     Current Living Situation   Location ([facility]/State): [address] [place] [id number]   With Whom: Living arrangements - the patient lives with their spouse.   Type of Home: single family   Working Phone? Yes    Three Pronged Outlet? Yes    Distance from Hospital: 25 minutes   Need to Relocate Post Surgery? No   Discussed? Yes      Vocational/Employment/Financial   Employment: Retired 3 yrs ago   Occupation: Pt has worked at the [employer], [employer] and .    Education: 12yrs    ? No   Income: SS skilled nursing and other: wife's SS   Insurance: Medicare and Medial Assistance w/ $376 spenddown    Name of Private Insurance: N/A    Medication Coverage: Yes     In current situation, pt. can afford medication and supply costs:  Yes    Other Financial Concerns/Issues: Pt and wife's financial concerns center around insurance. Pt has a high MA spenddown and currently has no other option for medical coverage until they get into open enrollment from October through November. Language barrier has been issue as they try to understand the MA paperwork and when they get into open enrollment and understanding the various plans.      Family/Social Support   Marital Status:    Partner Name: Shivani    Length of Time : 42yrs   Partner s Employment: Retired-was a cook at [employer]    Relationship: stable/supportive   Children: 4 Adult  Children: Lucian Bergman (lives locally), Elisabeth ([place]), Triny ([place]) and Silvestre ([place])   Parents:     Siblings: Pt has 5 sisters and 4 brothers    Other Family or Friends Close by: None    Support System: available, helpful   Primary Support Person: Pt's wifeShivani    Issues: Explained caregiver duties and responsibilities including 24hr caregiver support for the first 30 days following hospital discharge. Wife is motivated to perform caregiver duties and responsibilities.      Activities/Functional Ability   Current Level: ambulatory and independent with ADL's   Daily Activities: Works out 3-4x/wk on the treadmill/Bike/Swimming    Transportation: self      Medical Status   Patient s understanding of need for surgical intervention: Pt and his wife express good understanding of need to explore advanced therapy options of LVAD and Heart Transplant.    Advanced Directive? No    [facility]   Adherence History: Good    Ability to Adhere to Complex Medical Regime: Pt does not have any concerns      Behavioral   Chemical Dependency Issues: No: Pt denies ETOH and drug use   Smoker? No   Psychiatric impairment: No: Pt denies hx of mental health concerns, inpatient psychiatric admissions, suicide attempts or taking medication for a mental health diagnosis   Coping Style/Strategies: Pt likes to go to the gym-discussed that he would be unable to swim with an LVAD.    Recent Legal History: No   Teaching Completed During Assessment Related To Transplant and Mechanical Circulatory Support: 1. Housing and relocation needs post surgery.  2. Caregiver needs post surgery.  3. Financial issues related to surgery.  4. Risks of alcohol use post surgery.  5. Common psychosocial stressors pre/post surgery.  6. Support group availability.   Psychosocial Risks Reviewed Related To Transplant and Mechanical Circulatory Support: 1. Increased stress related to your emotional, family, social,  employment, or financial situation.  2. Affect on work and/or disability benefits.  3. Affect on future health and life insurance.  4. Outcome expectations may not be met.  5. Mental Health risks: anxiety, depression, PTSD, guilt, grief and chronic fatigue.     Observed Behavior   Well informed? Yes  Angry? No   Process info well? Yes  Hostile? Yes    Evasive? No Oriented X3? Yes    Cautious/Suspicious? No Motivated? Yes    Appropriate Behavior? Yes  Depressed? No   Appropriate Affect? Yes  Anxious? Yes    Interview Observations: Pt and wife were pleasant and eagerly participated in assessment. They asked appropriate questions throughout assessment.      Selection Criteria Met   Plan for Support Yes    Chemical Dependence Yes    Smoking Yes    Mental Health Yes    Adequate Finances Yes      Risk Issues:    No risk factors identified.     Final Evaluation/Assessment:     Pt and wife very pleasant and eager to learn about VAD and Heart Transplant. Pt appears motivated as he was able to go to Heart Transplant support group w/ a  and his wife. Language barrier could have been a potential barrier, in a large group setting, but pt handled this well and was able to meet with a heart transplant recipient for a 1:1 at the end of support group.     Pt's caregiver plan will consist of his wife, Shivani. Wife appears motivated and engaged in learning about caregiver role and responsibilities.     There are no concerns in the areas of chemical dependency, smoking or mental health.     There are some concerns from pt and wife regarding current insurance situation. Prior to evaluation, pt only had Medicare. Pt unable to obtain secondary policy as he is outside of the open enrollment period. Pt's only option was to apply for MA. Pt was approved, but has a $376/month. Pt will have the opportunity to look at options to purchase insurance during open enrollment period this fall. Currently, pt and wife are making  current situation work.         Patient understands risks and benefits of Heart Transplant and Mechanical Circulatory Support: Yes     Recommendation:    acceptable    Signature: Quyen Turcios     Title: Licensed Independent Clinical                Interview Date: July 9, 2019

## 2019-07-09 NOTE — PROGRESS NOTES
Outpatient MNT: Heart Transplant/VAD Evaluation    Current BMI: 33.1 (HT 65 in,  lbs/90 kg)  BMI is within criteria of <35 for heart transplant  Goal weight for heart transplant <210 lbs     Time Spent: 30 minutes  Visit Type: Initial  Referring Physician: Arvin   Pt accompanied by: his wife and a      Medical dx associated with RD referral  - CKD III  - DM II  - Ischemic cardiomyopathy     History of previous txp: none    Nutrition Assessment  Pt's wife cooks at home. She does not add salt and does not add bouillon/consumme for flavor. She uses Mrs Louis.     Appetite: good     Vitamins, Supplements, Pertinent Meds: none   Herbal Medicines/Supplements: none     Diet Recall  Breakfast Coffee with milk and 2 pieces of toast with PBJ or oatmeal    Lunch 1 egg with beans and 1-2 tortillas or soup with veggies and chicken or beef or seafood (soup made with water + Mrs Louis)   Dinner Coffee with milk, slice of bread, fruit    Snacks Pistachios or unsalted nuts   Beverages Plain water, hot tea    Alcohol None    Dining out Rare; only if daughter brings food to them (mostly salad if anything)     Physical Activity  Pt typically goes to the gym every other day for about 1 hour each time; walking on treadmill, biking, swimming in pool    Had a recent eye surgery so unable to go to the gym currently      Anthropometrics  Height:   65 in   BMI:    33.1    Weight Status:Obesity Grade I BMI 30-34.9   Weight:  199 lbs            IBW (lb): 136  % IBW: 146    Wt Hx: Pt reports mild L CEZAR, but not much overall. He is trying to lose weight. His weight was 206 lbs 2 months ago.     Adj/dosing BW: 152 lbs/69 kg       Labs  Recent Labs   Lab Test 07/08/19  1021 08/16/18  0834  06/27/15  0755 01/11/15  1033   CHOL 104 91   < > 82 203*   HDL 27* 25*   < > 32* 34*   LDL 41 7   < > 31 Cannot estimate LDL when triglyceride exceeds 400 mg/dL  107   TRIG 181* 297*   < > 99 519*   CHOLHDLRATIO  --   --   --  2.6  6.0*    < > = values in this interval not displayed.       Malnutrition  % Intake: No decreased intake noted  % Weight Loss: Weight loss does not meet criteria for malnutrition   Subcutaneous Fat Loss: None  Muscle Loss: None  Fluid Accumulation/Edema: Mild   Malnutrition Diagnosis: Patient does not meet two of the above criteria necessary for diagnosing malnutrition    Estimated Nutrition Needs  Energy  6963-3016     (20-25 kcal/kg dosing BW for desired wt loss)   Protein  55-69    (0.8-1 g/kg for maintenance)           Fluid  Pending MD   Micronutrient   Na+: <2000 mg/day              Nutrition Diagnosis  Food and nutrition related knowledge deficit r/t pre heart transplant eval AEB pt verbalized not hearing pre/post transplant diet guidelines.    Nutrition Intervention  Nutrition education provided:  Discussed sodium intake (low sodium foods and drinks, seasoning food without salt and tips for low sodium diet).    Reviewed BMI and weight criteria for transplant. Encouraged pt to reduce bread or tortilla consumption by half daily. Would include protein at breakfast as able (eggs with only 1 tortilla instead of 2). Monitor portion size of nuts as a snack and encouraged reverse measuring, with goal for 1/4 cup as a serving.     Reviewed post txp diet guidelines in brief (will review in further detail post txp):  (1) Review of proper food safety measures d/t immunosuppressant therapy post-op and increased risk for food-borne illness    (2) Avoid the following post txp d/t risk for rejection, unknown effects on the organs, and/or potential interactions with immunosuppressants:  - Herbal, Chinese, holistic, chiropractic, natural, alternative medicines and supplements  - Detoxes and cleanses  - Weight loss pills  - Protein powders or other products with extracts or herbs (ie green tea extract)    (3) Med regimen and possible side effects    Patient Understanding: Pt verbalized understanding of education  provided.  Expected Compliance: Good  Follow-Up Plans: will follow up in 1 month via phone to check on weight    Nutrition Goals  1. Limit Na+ <2000mg/day  2. Pt to verbalize understanding of 3 aspects of post txp education provided   3. BMI to remain <35 or <210 lbs    Provided pt with contact info.   Niesha Nichols RD, LD  Pgr [phone]

## 2019-07-09 NOTE — NURSING NOTE
"Met with patient, wife, Shivani to present the HM3 and HVAD VAD(s) as possible treatment option.     Discussed patient and caregiver responsibilities before and after VAD implant.  Clarified the need for a caregiver to be present for education and to assist patient for at least first 30 days after a patient returns home. Patient's designated caregiver is Shivani.     Discussed basic overview of VAD equipment, on going management, need for anticoagulation, regular dressing change, grounded three-pronged outlet and functioning telephone. Also discussed frequency of follow-up clinic appointments and the need to stay locally for at least 2-4 weeks.  Reviewed restrictions of having a VAD such as no swimming, bathing, boats, MRI's, or arc welding.     Provided and discussed the VAD educational brochures, information regarding the VAD and transplant support groups, information on INTERMACs registry, and \"VAD What You Should Know\" with additional details. Patient and wife signed \"VAD What You Should Know\" document. VAD coordinator contact information provided.  Encouraged patient, wife to call with questions.     Show and tell done with .  Pt sent [place] materials in the mail.    "

## 2019-07-09 NOTE — Clinical Note
When you schedule Mr Tee Henderson's colonoscopy, let the Endoscopy team know that it's ok to hold his Plavix prior to his procedure.  I've written a note, but not sure if they'll look for it.Thanks!Bill

## 2019-07-09 NOTE — PROGRESS NOTES
Mr Tee Henderson has never had a colonoscopy, and needs to complete this prior to being listed for a heart transplant.  He prefers to have this procedure done at [facility].  In addition, he is due for dental care, and would also like to see the [facility]'s clinic.  Orders posted for both.    Per Dr Sheridan, ok to hold Plavix prior to colonoscopy.  Pt aware.

## 2019-07-10 ENCOUNTER — OFFICE VISIT (OUTPATIENT)
Dept: INTERPRETER SERVICES | Facility: CLINIC | Age: 64
End: 2019-07-10
Payer: COMMERCIAL

## 2019-07-10 ENCOUNTER — HOSPITAL ENCOUNTER (OUTPATIENT)
Facility: CLINIC | Age: 64
Discharge: HOME | End: 2019-07-10
Attending: INTERNAL MEDICINE | Admitting: INTERNAL MEDICINE
Payer: COMMERCIAL

## 2019-07-10 ENCOUNTER — APPOINTMENT (OUTPATIENT)
Dept: MEDSURG UNIT | Facility: CLINIC | Age: 64
End: 2019-07-10
Attending: INTERNAL MEDICINE
Payer: COMMERCIAL

## 2019-07-10 VITALS
OXYGEN SATURATION: 98 % | RESPIRATION RATE: 16 BRPM | HEART RATE: 90 BPM | TEMPERATURE: 98.7 F | SYSTOLIC BLOOD PRESSURE: 130 MMHG | DIASTOLIC BLOOD PRESSURE: 76 MMHG

## 2019-07-10 DIAGNOSIS — I50.20 SYSTOLIC HEART FAILURE (H): ICD-10-CM

## 2019-07-10 DIAGNOSIS — I50.22 CHRONIC SYSTOLIC HEART FAILURE (H): Chronic | ICD-10-CM

## 2019-07-10 LAB
A* LOCUS: NORMAL
ABTEST METHOD: NORMAL
B* LOCUS: NORMAL
B*: NORMAL
BW-1: NORMAL
C* LOCUS: NORMAL
C*: NORMAL
DPA1* LOCUS NMDP: NORMAL
DPA1* NMDP: NORMAL
DPA1*: NORMAL
DPA1*LOCUS: NORMAL
DPB1* LOCUS NMDP: NORMAL
DPB1* NMDP: NORMAL
DPB1*: NORMAL
DPB1*LOCUS: NORMAL
DQA1*: NORMAL
DQA1*LOCUS: NORMAL
DQB1* LOCUS: NORMAL
DQB1*: NORMAL
DRB1* LOCUS: NORMAL
DRB1*: NORMAL
DRB4* LOCUS: NORMAL
DRSSO TEST METHOD: NORMAL
LA PPP-IMP: NEGATIVE
LAB SCANNED RESULT: NORMAL

## 2019-07-10 PROCEDURE — 25000125 ZZHC RX 250: Performed by: INTERNAL MEDICINE

## 2019-07-10 PROCEDURE — 85025 COMPLETE CBC W/AUTO DIFF WBC: CPT | Performed by: INTERNAL MEDICINE

## 2019-07-10 PROCEDURE — T1013 SIGN LANG/ORAL INTERPRETER: HCPCS | Mod: U3

## 2019-07-10 PROCEDURE — 93451 RIGHT HEART CATH: CPT | Performed by: INTERNAL MEDICINE

## 2019-07-10 PROCEDURE — 93451 RIGHT HEART CATH: CPT | Mod: 26 | Performed by: INTERNAL MEDICINE

## 2019-07-10 PROCEDURE — 93463 DRUG ADMIN & HEMODYNMIC MEAS: CPT | Performed by: INTERNAL MEDICINE

## 2019-07-10 PROCEDURE — 40000166 ZZH STATISTIC PP CARE STAGE 1

## 2019-07-10 PROCEDURE — 25800030 ZZH RX IP 258 OP 636: Performed by: INTERNAL MEDICINE

## 2019-07-10 PROCEDURE — 27210794 ZZH OR GENERAL SUPPLY STERILE: Performed by: INTERNAL MEDICINE

## 2019-07-10 RX ORDER — SODIUM NITROPRUSSIDE 25 MG/ML
INJECTION INTRAVENOUS
Status: DISCONTINUED | OUTPATIENT
Start: 2019-07-10 | End: 2019-07-10 | Stop reason: HOSPADM

## 2019-07-10 RX ORDER — LIDOCAINE 40 MG/G
CREAM TOPICAL
Status: COMPLETED | OUTPATIENT
Start: 2019-07-10 | End: 2019-07-10

## 2019-07-10 RX ADMIN — LIDOCAINE: 40 CREAM TOPICAL at 14:17

## 2019-07-10 RX ADMIN — SODIUM NITROPRUSSIDE 0.5 MCG/KG/MIN: 25 INJECTION INTRAVENOUS at 16:28

## 2019-07-10 NOTE — PROGRESS NOTES
1425 Pt on 2a prepped and ready for RHC. Lidocaine applied to right neck. Family and  at bedside. Pt consented.

## 2019-07-10 NOTE — DISCHARGE INSTRUCTIONS
[facility]                        Interventional Cardiology  Discharge Instructions   Post Right Heart Cath      AFTER YOU GO HOME:    DO drink plenty of fluids    DO resume your regular diet and medications unless otherwise instructed by your Primary Physician    Do Not scrub the procedure site vigorously    No lotion or powder to the puncture site for 3 days    CALL YOUR PRIMARY PHYSICIAN IF: You may resume all normal activity.  Monitor neck site for bleeding, swelling, or voice changes. If you notice bleeding or swelling immediately apply pressure to the site and call number below to speak with Cardiology Fellow.  If you experience any changes in your breathing you should call your doctor immediately or come to the closest Emergency Department.  Do not drive yourself.    ADDITIONAL INSTRUCTIONS: Medications: You are to resume all home medications including anticoagulation therapy unless otherwise advised by your primary cardiologist or nurse coordinator.    Follow Up: Per your primary cardiology team    If you have any questions or concerns regarding your procedure site please call [phone] at anytime and ask for Cardiology Fellow on call.  They are available 24 hours a day.  You may also contact the Cardiology Clinic after hours number at [phone].                                                       Telephone Numbers [phone] Monday-Friday 8:00 am to 4:30 pm    [phone] After 4:30 pm Monday-Friday, Weekends & Holidays  Ask for Interventional Cardiologist on call. Someone is on call 24 hours/day   [facility] toll free number [phone] Monday-Friday 8:00 am to 4:30 pm   [facility] Emergency Dept [phone]

## 2019-07-10 NOTE — PROGRESS NOTES
Pt discharged at this time. Discharge instructions completed with the use of . Pt refused PO food but accepted water and tolerated well. Ambulates steady on feet with no complaints of pain or dizziness. Right neck site appears clean, dry, intact, and soft.

## 2019-07-10 NOTE — PROGRESS NOTES
[facility]   Interventional Cardiology        Consenting/Education for Right Heart Catheterization     Patient understands due to their advanced heart failure we would like to perform right heart catheterization as a part of his heart transplant evaluation/referral.  This procedure will be performed by Dr. Stuart.    Patient understands during the right heart catheterization, a fine tube (catheter) is put into the vein of the groin/neck.  It is carefully passed along until it reaches the heart and then goes up into the blood vessels of the lungs. This is done to measure a variety of pressures in your heart and can tell us how well the heart is filling and emptying, as well as monitor fluid status. This is usually painless. The doctor uses x-ray imaging to see the catheter. Afterwards, the catheter is removed, and incision site covered before leaving the cath lab. This procedure is done with no sedation, usually only requiring Lidocaine.     Patient also understands risks and complications of the procedure which include, but are not limited to bruising/swelling around the incision site, infection, bleeding, allergic reaction to local anesthetic, air embolism, arterial puncture, stroke, heart attack.       Patient verbalized understanding of risks and benefits of the right heart catheterization and has elected to proceed with right heart catheterization.     Patient seen with  present, ELIJAH Jorgensen, CNP  [facility] Interventional Cardiology  [phone]

## 2019-07-10 NOTE — IP AVS SNAPSHOT
[address] [address]                                    After Visit Summary   7/10/2019    Lucian Daleluther Marks    MRN: [id number]           After Visit Summary Signature Page    I have received my discharge instructions, and my questions have been answered. I have discussed any challenges I see with this plan with the nurse or doctor.    ..........................................................................................................................................  Patient/Patient Representative Signature      ..........................................................................................................................................  Patient Representative Print Name and Relationship to Patient    ..................................................               ................................................  Date                                   Time    ..........................................................................................................................................  Reviewed by Signature/Title    ...................................................              ..............................................  Date                                               Time          22EPIC Rev 08/18

## 2019-07-10 NOTE — IP AVS SNAPSHOT
MRN:[id number]                      After Visit Summary   7/10/2019    Lucian Henderson .    MRN: [id number]           Visit Information        Department      7/10/2019 12:52 PM [address] [facility]          Review of your medicines      UNREVIEWED medicines. Ask your doctor about these medicines       Dose / Directions   aspirin 81 MG tablet  Commonly known as:  ASA  Used for:  Ischemic cardiomyopathy      Dose:  81 mg  Take 1 tablet (81 mg) by mouth daily  Quantity:  30 tablet  Refills:  11     atorvastatin 40 MG tablet  Commonly known as:  LIPITOR  Used for:  Chronic systolic heart failure, Wheezing, CKD (chronic kidney disease) stage 3, GFR 30-59 ml/min (H), CHF (NYHA class II, ACC/AHA stage C) (H)      Dose:  40 mg  Take 1 tablet (40 mg) by mouth daily  Quantity:  90 tablet  Refills:  3     carvedilol 25 MG tablet  Commonly known as:  COREG  Used for:  Hypertension goal BP (blood pressure) < 140/90      Dose:  12.5 mg  Take 0.5 tablets (12.5 mg) by mouth 2 times daily (with meals)  Quantity:  180 tablet  Refills:  1     clopidogrel 75 MG tablet  Commonly known as:  PLAVIX  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)      Dose:  75 mg  Take 1 tablet (75 mg) by mouth daily  Quantity:  90 tablet  Refills:  3     furosemide 20 MG tablet  Commonly known as:  LASIX  Used for:  CHF (NYHA class II, ACC/AHA stage C) (H)      Dose:  20 mg  Take 1 tablet (20 mg) by mouth daily  Quantity:  90 tablet  Refills:  1     glimepiride 4 MG tablet  Commonly known as:  AMARYL  Used for:  Type 2 diabetes mellitus with hyperglycemia, with long-term current use of insulin (H)      Dose:  4 mg  Take 1 tablet (4 mg) by mouth every morning (before breakfast)  Quantity:  180 tablet  Refills:  3     hydrALAZINE 25 MG tablet  Commonly known as:  APRESOLINE  Used for:  Chronic systolic heart failure (H), CHF (NYHA class II, ACC/AHA stage C)  (H)      Dose:  25 mg  Take 1 tablet (25 mg) by mouth 3 times daily  Quantity:  270 tablet  Refills:  1     insulin glargine 100 UNIT/ML pen  Commonly known as:  LANTUS SOLOSTAR  Used for:  Type 2 diabetes mellitus with diabetic nephropathy, with long-term current use of insulin (H)      Take 8 units in the morning and 40 units in the evening  Quantity:  45 mL  Refills:  3     isosorbide mononitrate 60 MG 24 hr tablet  Commonly known as:  IMDUR  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)      Dose:  60 mg  Take 1 tablet (60 mg) by mouth daily  Quantity:  90 tablet  Refills:  1     losartan 50 MG tablet  Commonly known as:  COZAAR  Used for:  CKD (chronic kidney disease) stage 3, GFR 30-59 ml/min (H), CHF (NYHA class II, ACC/AHA stage C) (H), Chronic systolic heart failure, Wheezing      Dose:  50 mg  Take 1 tablet (50 mg) by mouth daily  Quantity:  90 tablet  Refills:  3     neomycin-polymyxin-dexamethasone 3.5-19104-5.1 ophthalmic ointment  Commonly known as:  MAXITROL  Used for:  Type 2 diabetes mellitus with both eyes affected by proliferative retinopathy and macular edema, with long-term current use of insulin (H)      Dose:  0.5 inch  Place 0.5 inches into both eyes At Bedtime  Quantity:  1 Tube  Refills:  3     nitroGLYcerin 0.4 MG sublingual tablet  Commonly known as:  NITROSTAT  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  Quantity:  10 tablet  Refills:  0     sitagliptin 50 MG tablet  Commonly known as:  JANUVIA  Used for:  Type 2 diabetes mellitus with hyperglycemia, with long-term current use of insulin (H), Type 2 diabetes mellitus with stage 3 chronic kidney disease, with long-term current  use of insulin (H)      Dose:  50 mg  Take 1 tablet (50 mg) by mouth daily  Quantity:  90 tablet  Refills:  5        CONTINUE these medicines which have NOT CHANGED       Dose / Directions   B-D U/F 31G X 5 MM miscellaneous  Used for:  Type 2 diabetes mellitus with diabetic nephropathy, with long-term current use of insulin (H)  Generic drug:  insulin pen needle      USE 1 NEEDLE DAILY AS DIRECTED -DUE FOR APPOINTMENT FOR FURTHER REFILLS  Quantity:  100 each  Refills:  0     blood glucose monitoring meter device kit  Used for:  Type 2 diabetes mellitus with diabetic nephropathy, with long-term current use of insulin (H)      Use to test blood sugar 3-4 times daily, as directed.  Quantity:  1 kit  Refills:  0     blood glucose test strip  Commonly known as:  ACCU-CHEK SMARTVIEW  Used for:  Type 2 diabetes mellitus with diabetic nephropathy, with long-term current use of insulin (H)      USE TO TEST THREE TIMES DAILY AS DIRECTED  Quantity:  100 strip  Refills:  0     order for DME      Auto CPAP 8-15 cmH20  Quantity:  1 Units  Refills:  0              Protect others around you: Learn how to safely use, store and throw away your medicines at www.disposemymeds.org.       Follow-ups after your visit       Your next 10 appointments already scheduled    Jul 10, 2019  Procedure with John Stuart MD  [facility], [facility],  Heart Cath Lab ([facility], [facility]) [address] [id number] [phone]   The [facility] is located on the corner of [address] and [address] on the [address]. It is easily accessible from virtually any point in the [place] area, via [address] and [address].   Jul 11, 2019 12:00 PM CDT  Social Work Visit with [facility] Consult Ed  [facility] ([facility]) [address]  [address]  [facility] [place] [id number] [phone]      Jul 17, 2019  2:45 PM CDT  (Arrive by 2:30  PM)  New Patient Visit with Denita Bueno MD  [facility] ([facility]) [address]  [address]  [place] [address]  [phone]      Jul 18, 2019 12:30 PM CDT  (Arrive by 12:15 PM)  Neuropsych Eval 180 with Chauncey Livingston, PhD  [facility] ([facility]) [address]  3rd Floor  [place] [facility] [address]  [phone]      Jul 29, 2019  4:00 PM CDT  (Arrive by 3:45 PM)  New Patient Visit with Ran Huertas MD  [facility] ([facility]) [address]  [address]  [place] [facility] [address]  [phone]      Aug 01, 2019  2:30 PM CDT  RETURN RETINA with Triny Bunch MD  Eye Clinic ([facility]) [address]  [address]  [place] [facility] [address]  [phone]      Aug 08, 2019 10:30 AM CDT  (Arrive by 10:15 AM)  RETURN DIABETES with Marisabel Prieto PA-C  [facility] Endocrinology ([facility]) [address]  3rd Floor  [facility] [address] [phone]         Care Instructions       Further instructions from your care team       [facility]                        Interventional Cardiology  Discharge Instructions   Post Right Heart Cath      AFTER YOU GO HOME:    DO drink plenty of fluids    DO resume your regular diet and medications unless otherwise instructed by your Primary Physician    Do Not scrub the procedure site vigorously    No lotion or powder to the puncture site for 3 days    CALL YOUR PRIMARY PHYSICIAN IF: You may resume all normal activity.  Monitor neck site for bleeding, swelling, or voice changes. If you notice bleeding or swelling immediately apply pressure to the site and call number below to speak with Cardiology Fellow.  If you experience any changes in your breathing you should call your doctor immediately or come to the closest  Emergency Department.  Do not drive yourself.    ADDITIONAL INSTRUCTIONS: Medications: You are to resume all home medications including anticoagulation therapy unless otherwise advised by your primary cardiologist or nurse coordinator.    Follow Up: Per your primary cardiology team    If you have any questions or concerns regarding your procedure site please call [phone] at anytime and ask for Cardiology Fellow on call.  They are available 24 hours a day.  You may also contact the Cardiology Clinic after hours number at [phone].                                                       Telephone Numbers [phone] Monday-Friday 8:00 am to 4:30 pm    [phone] After 4:30 pm Monday-Friday, Weekends & Holidays  Ask for Interventional Cardiologist on call. Someone is on call 24 hours/day   [facility] toll free number [phone] Monday-Friday 8:00 am to 4:30 pm   [facility] Emergency Dept [phone]                   Additional Information About Your Visit       [employer] [facility] [employer] [employer] Information    [employer] gives you secure access to your electronic health record. If you see a primary care provider, you can also send messages to your care team and make appointments. If you have questions, please call your primary care clinic.  If you do not have a primary care provider, please call [phone] and they will assist you.       Care EveryWhere ID    This is your Care EveryWhere ID. This could be used by other organizations to access your [facility] medical records  [id number]        Primary Care Provider Office Phone # Fax #    Suyapa [place]field [phone]      Equal Access to Services    [facility] : Hadii aad ku hadasho Somackenzieali, waaxda luqadaha, qaybta kaalmada adesona, joana pulido. So [facility] [phone].    ATENCIÓN: Si habla español, tiene a lainez disposición servicios gratuitos de asistencia lingüística. Llame al [phone].    We comply with applicable federal  civil rights laws and [place] laws. We do not discriminate on the basis of race, color, national origin, age, disability, sex, sexual orientation, or gender identity.           Thank you!    Thank you for choosing [facility] for your care. Our goal is always to provide you with excellent care. Hearing back from our patients is one way we can continue to improve our services. Please take a few minutes to complete the written survey that you may receive in the mail after you visit with us. Thank you!            Medication List      Medications          Morning Afternoon Evening Bedtime As Needed    B-D U/F 31G X 5 MM miscellaneous  INSTRUCTIONS:  USE 1 NEEDLE DAILY AS DIRECTED -DUE FOR APPOINTMENT FOR FURTHER REFILLS  Generic drug:  insulin pen needle                     blood glucose monitoring meter device kit  INSTRUCTIONS:  Use to test blood sugar 3-4 times daily, as directed.                     blood glucose test strip  Also known as:  ACCU-CHEK SMARTVIEW  INSTRUCTIONS:  USE TO TEST THREE TIMES DAILY AS DIRECTED  Doctor's comments:  Due for appointment for further refills, please give reminder.                     order for DME  INSTRUCTIONS:  Auto CPAP 8-15 cmH20                       ASK your doctor about these medications          Morning Afternoon Evening Bedtime As Needed    aspirin 81 MG tablet  Also known as:  ASA  INSTRUCTIONS:  Take 1 tablet (81 mg) by mouth daily                     atorvastatin 40 MG tablet  Also known as:  LIPITOR  INSTRUCTIONS:  Take 1 tablet (40 mg) by mouth daily                     carvedilol 25 MG tablet  Also known as:  COREG  INSTRUCTIONS:  Take 0.5 tablets (12.5 mg) by mouth 2 times daily (with meals)  Doctor's comments:  DOSAGE DECREASE. Patient will call for refills as needed.                     clopidogrel 75 MG tablet  Also known as:  PLAVIX  INSTRUCTIONS:  Take 1 tablet (75 mg) by mouth daily                     furosemide 20 MG tablet  Also known as:   LASIX  INSTRUCTIONS:  Take 1 tablet (20 mg) by mouth daily  Doctor's comments:  Dosage decrease. Patient will call for refills as needed.                     glimepiride 4 MG tablet  Also known as:  AMARYL  INSTRUCTIONS:  Take 1 tablet (4 mg) by mouth every morning (before breakfast)  Doctor's comments:  Don't fill until patient requests                     hydrALAZINE 25 MG tablet  Also known as:  APRESOLINE  INSTRUCTIONS:  Take 1 tablet (25 mg) by mouth 3 times daily                     insulin glargine 100 UNIT/ML pen  Also known as:  LANTUS SOLOSTAR  INSTRUCTIONS:  Take 8 units in the morning and 40 units in the evening  Doctor's comments:  Don't fill until request                     isosorbide mononitrate 60 MG 24 hr tablet  Also known as:  IMDUR  INSTRUCTIONS:  Take 1 tablet (60 mg) by mouth daily  Doctor's comments:  Dosage increase. Patient will call for refills as needed.                     losartan 50 MG tablet  Also known as:  COZAAR  INSTRUCTIONS:  Take 1 tablet (50 mg) by mouth daily                     neomycin-polymyxin-dexamethasone 3.5-14293-4.1 ophthalmic ointment  Also known as:  MAXITROL  INSTRUCTIONS:  Place 0.5 inches into both eyes At Bedtime                     nitroGLYcerin 0.4 MG sublingual tablet  Also known as:  NITROSTAT  INSTRUCTIONS:  Place 1 tablet (0.4 mg) under the tongue every 5 minutes as needed for chest pain If you are still having symptoms after 3 doses (15 minutes) call 911.                     sitagliptin 50 MG tablet  Also known as:  JANUVIA  INSTRUCTIONS:  Take 1 tablet (50 mg) by mouth daily  Doctor's comments:  Don't fill until requested

## 2019-07-11 ENCOUNTER — ALLIED HEALTH/NURSE VISIT (OUTPATIENT)
Dept: TRANSPLANT | Facility: CLINIC | Age: 64
End: 2019-07-11
Attending: INTERNAL MEDICINE
Payer: COMMERCIAL

## 2019-07-11 LAB
HAV IGG SER QL IA: REACTIVE
SA1 CELL: NORMAL
SA1 COMMENTS: NORMAL
SA1 HI RISK ABY: NORMAL
SA1 MOD RISK ABY: NORMAL
SA1 TEST METHOD: NORMAL
SA2 CELL: NORMAL
SA2 COMMENTS: NORMAL
SA2 HI RISK ABY UA: NORMAL
SA2 MOD RISK ABY: NORMAL
SA2 TEST METHOD: NORMAL
UNACCEPTABLE ANTIGEN: NORMAL
UNOS CPRA: 0

## 2019-07-12 DIAGNOSIS — I50.20 SYSTOLIC HEART FAILURE (H): Primary | ICD-10-CM

## 2019-07-12 LAB — MISCELLANEOUS TEST: NORMAL

## 2019-07-12 NOTE — PROGRESS NOTES
Hepatitis A beba positive:  Lucian had Hepatitis A drawn as part of his transplant evaluation, and the antibody came back positive.  Per Send-out Lab, will send a Hepatitis A Quant. To rule out current infection.  Lab will be added on to a sample drawn earlier.

## 2019-07-14 LAB
COTININE SERPL-MCNC: NORMAL NG/ML
NICOTINE SERPL-MCNC: NORMAL NG/ML

## 2019-07-15 LAB — LAB SCANNED RESULT: NORMAL

## 2019-07-17 ENCOUNTER — OFFICE VISIT (OUTPATIENT)
Dept: PALLIATIVE CARE | Facility: CLINIC | Age: 64
End: 2019-07-17
Attending: INTERNAL MEDICINE
Payer: COMMERCIAL

## 2019-07-17 VITALS
SYSTOLIC BLOOD PRESSURE: 103 MMHG | HEIGHT: 65 IN | WEIGHT: 200.3 LBS | BODY MASS INDEX: 33.37 KG/M2 | HEART RATE: 82 BPM | TEMPERATURE: 97.3 F | RESPIRATION RATE: 16 BRPM | OXYGEN SATURATION: 97 % | DIASTOLIC BLOOD PRESSURE: 64 MMHG

## 2019-07-17 DIAGNOSIS — Z71.89 ADVANCED DIRECTIVES, COUNSELING/DISCUSSION: Primary | Chronic | ICD-10-CM

## 2019-07-17 DIAGNOSIS — Z76.82 HEART TRANSPLANT CANDIDATE: ICD-10-CM

## 2019-07-17 DIAGNOSIS — I50.22 CHRONIC SYSTOLIC HEART FAILURE (H): ICD-10-CM

## 2019-07-17 PROCEDURE — T1013 SIGN LANG/ORAL INTERPRETER: HCPCS | Mod: U3,ZF

## 2019-07-17 PROCEDURE — 40000114 ZZH STATISTIC NO CHARGE CLINIC VISIT

## 2019-07-17 PROCEDURE — G0463 HOSPITAL OUTPT CLINIC VISIT: HCPCS | Mod: ZF

## 2019-07-17 PROCEDURE — 99205 OFFICE O/P NEW HI 60 MIN: CPT | Mod: GC | Performed by: INTERNAL MEDICINE

## 2019-07-17 ASSESSMENT — MIFFLIN-ST. JEOR: SCORE: 1620.43

## 2019-07-17 ASSESSMENT — PAIN SCALES - GENERAL: PAINLEVEL: NO PAIN (0)

## 2019-07-17 NOTE — LETTER
"7/17/2019       RE: Lucian Henderson Sr.  [address] [facility] [address]     Dear Colleague,    Thank you for referring your patient, Lucian Henderson Sr., to the [facility] at [facility]. Please see a copy of my visit note below.      Palliative Care Outpatient Clinic Consultation Note    Patient:  Lucian Henderson Sr.    Chief Complaint:   Lucian Henderson Sr. 63 year old male with PMH that includes coronary artery disease status post bypass surgery in 2015, CKD stage III, diabetes mellitus and ischemic cardiomyopathy with consideration of advanced therapies including heart transplant. Patient is presenting to the Palliative Medicine clinic today at the request of Dr. Grimes for a palliative care consultation secondary to ischemic cardiomyopathy and consideration of advanced cardiac therapies.   The patient's primary care provider is:  Suyapa Hatfield.     History of Present Illness:  Mr. Tee Henderson presents to palliative care clinic today to establish care and per the request of his cardiologist as part of evaluation for advanced therapies and consideration of heart transplant for a history of ischemic cardiomyopathy.  Patient is present today with his wife Shivani.  Patient visit today was completed with a Kazakh  present.    Patient reports that he has been feeling fairly well, he tries to stay active and goes to the gym on a regular basis where he will walk on a treadmill, ride a stationary bike, or swim for about 25 to 30 minutes.  He reports he is able to do these activities if he carefully paces himself so that his breathing is comfortable, he notes that if he \"pushes it\" that he will become more short of breath.  Patient reports that he feels he can reasonably tolerate walking on level ground but walking up an incline is difficult and he has to stop to rest for about 4 minutes and " "catch his breath before he can continue.  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  Asked patient what a typical day looks like for him, he reports that he is usually either at home or at the gym and when he is at home he does small chores around the house such as taking out the garbage or washing the dishes.    Patient denies pain, denies orthopnea or PND symptoms, reports mild left leg swelling but reports that this is not new and is in fact better than it has been in the past and he reports asymmetric leg swelling ever since his bypass surgery in 2015 as he had vein harvest from his left lower extremity.  Patient reports appetite is good and jokes with this provider that perhaps it is \"too good\" as he is trying to lose weight.  No nausea, constipation or diarrhea reported.    Patient did not seem to understand that LVAD was being considered for him, and instead spoke only about being considered for possible heart transplant.    Patient s legally designated health care agent: Patient designates his wife Shivani is his healthcare surrogate decision-maker.    Patient s description of how they make decisions (by themselves, in consultation with certain close loved ones, based on advice from medical professionals, etc): Patient appears to make decisions talking with his wife and based heavily on advice from medical professionals.    Patient s personal goals/hopes after heart transplant: I asked patient about his goals and hopes after receiving a transplant and and he did not articulate specific goals or hopes but rather asked this provider \"will I feel better after transplant?\"    Patient s worries/concerns when considering receiving " heart transplant: I asked the patient and his wife about any worries or concerns and they were not able to articulate specific worries or concerns with this provider, however, his wife became somewhat tearful when this provider offered that it can feel overwhelming or scary to consider a significant surgery such as a heart transplant.  How does the patient envision or anticipate his/her future with heart transplant?  I asked the patient about what he envisioned or anticipated post transplant and he was not able to name anything specific.  Patient s thoughts about what constitutes a  good  quality of life: Patient seemed somewhat reluctant to discuss what a good quality of life means to him, he did tell me it is important to be home and to be with family.    Patient s thoughts about what health conditions would be unacceptable (situations the patient would want her/his doctors and loved ones to know that she/he would not want life prolonged)?  It was difficult to elucidate at today's visit what patient would consider to be an unacceptable health condition or quality of life.  I encouraged he and his family to talk about what his goals are for his health and what is important to him in order to have a good or acceptable quality of life.    After a stroke, what sort of functional outcomes would be acceptable vs unacceptable to the patient?  This was difficult to elucidate at today's visit, patient again noted that being home was his goal.     Chronic mechanical ventilation via a tracheostomy tube is a risk. What are the circumstances the patient would want her/his physicians and family to keep them alive with long-term mechanical ventilation vs allow them to die?  Discussed with patient and his wife what tracheostomy is and that tracheostomy is something that is considered if the patient requires an extended period of time with mechanical ventilation, for example postoperatively.  Patient was not able to tell me at  today's visit whether he would consider tracheostomy should he require prolonged mechanical ventilation postoperatively.      Distressing Symptom/s:  None currently, see HPI for discussion of respiratory symptoms.    Patient's Disease Understanding: I am concerned that patient does not have a thorough understanding of his cardiac history and also what advanced therapies up to and including heart transplant would entail. The patient and his wife note that they have no questions about heart transplant including the procedure itself, potential complications, or what clinical course and life post transplant would be like. I encouraged the patient and his wife on several occasions today to ask questions and asked for clarification for anything that they do not understand.      Coping: Patient seems to be coping reasonably well at this time.    Social History  Living Situation: Patient lives in the community with his wife and a dog.  Patient has 3 children  Children: Including 2 adult sons and a daughter, when asked who could help support he and his wife after surgery such as heart transplant patient noted he has 2 sons who would help him and his wife, he did not identify his daughter as a support for he and his wife postoperatively.  Actual/Potential Caregiver(s): Wife, adult children  Support System: Family  Occupation: Retired, used to work for a company making [employer]  Hobbies: Patient previously enjoyed working on cars but is no longer able to do this, he does enjoy going to the gym and does this on a nearly daily basis.  Patient denies  Substance Use/History of misuse: Patient denies tobacco use, alcohol use, or recreational drug use ever.  Financial Concerns: None reported  Spiritual Background: Spiritual background not identified today, however patient notes that he and his family do attend Religion regularly  Spiritual Concerns/Needs: None reported  Social History     Tobacco Use     Smoking status: Never Smoker      Smokeless tobacco: Never Used     Tobacco comment: Never smoked; non-smoking household   Substance Use Topics     Alcohol use: No     Alcohol/week: 0.0 oz     Drug use: No       Family History  Family History   Problem Relation Age of Onset     Diabetes Brother      Diabetes Sister      Diabetes Sister      Macular Degeneration No family hx of      Glaucoma No family hx of      Myocardial Infarction No family hx of      Kidney Disease No family hx of      Patient's Involvement with Prior History of Serious Illness in Family: None reported    Advance Care Planning:  Advance Directive:    Discussed advanced directive completion with patient today, he did not wish to complete paperwork at today's visit but does designate his wife Shivani as his healthcare surrogate decision-maker.  Health Care Agent Contact Information: Patient verbally designates his wife Shivani as his healthcare surrogate decision-maker.  POLST:   Not on file    Allergies   Allergen Reactions     No Known Drug Allergies      Current Outpatient Medications   Medication Sig Dispense Refill     aspirin 81 MG tablet Take 1 tablet (81 mg) by mouth daily 30 tablet 11     atorvastatin (LIPITOR) 40 MG tablet Take 1 tablet (40 mg) by mouth daily 90 tablet 3     B-D U/F insulin pen needle USE 1 NEEDLE DAILY AS DIRECTED -DUE FOR APPOINTMENT FOR FURTHER REFILLS 100 each 0     blood glucose (ACCU-CHEK SMARTVIEW) test strip USE TO TEST THREE TIMES DAILY AS DIRECTED 100 strip 0     blood glucose monitoring (ACCU-CHEK FELIPE SMARTVIEW) meter device kit Use to test blood sugar 3-4 times daily, as directed. 1 kit 0     carvedilol (COREG) 25 MG tablet Take 0.5 tablets (12.5 mg) by mouth 2 times daily (with meals) 180 tablet 1     clopidogrel (PLAVIX) 75 MG tablet Take 1 tablet (75 mg) by mouth daily 90 tablet 3     furosemide (LASIX) 20 MG tablet Take 1 tablet (20 mg) by mouth daily 90 tablet 1     glimepiride (AMARYL) 4 MG tablet Take 1 tablet (4 mg) by mouth every morning  (before breakfast) 180 tablet 3     hydrALAZINE (APRESOLINE) 25 MG tablet Take 1 tablet (25 mg) by mouth 3 times daily 270 tablet 1     insulin glargine (LANTUS SOLOSTAR) 100 UNIT/ML pen Take 8 units in the morning and 40 units in the evening 45 mL 3     isosorbide mononitrate (IMDUR) 60 MG 24 hr tablet Take 1 tablet (60 mg) by mouth daily 90 tablet 1     losartan (COZAAR) 50 MG tablet Take 1 tablet (50 mg) by mouth daily 90 tablet 3     neomycin-polymyxin-dexamethasone (MAXITROL) 3.5-25070-3.1 ophthalmic ointment Place 0.5 inches into both eyes At Bedtime 1 Tube 3     nitroglycerin (NITROSTAT) 0.4 MG SL tablet Place 1 tablet (0.4 mg) under the tongue every 5 minutes as needed for chest pain If you are still having symptoms after 3 doses (15 minutes) call 911. 10 tablet 0     order for DME Auto CPAP 8-15 cmH20 1 Units 0     sitagliptin (JANUVIA) 50 MG tablet Take 1 tablet (50 mg) by mouth daily 90 tablet 5     Past Medical History:   Diagnosis Date     CAD (coronary artery disease)      CHF (congestive heart failure) (H)      CKD (chronic kidney disease), stage III (H)      Cortical cataract of both eyes      Diabetes (H)      Hyperlipidemia      Hypertension      Ischemic cardiomyopathy      Obesity      CHANTEL (obstructive sleep apnea)      Osteoarthritis      Past Surgical History:   Procedure Laterality Date     C CABG, ARTERY-VEIN, THREE  02/2008     CV RIGHT HEART CATH N/A 3/25/2019    Procedure: CV RIGHT HEART CATH;  Surgeon: Moises Santos MD;  Location:  HEART CARDIAC CATH LAB     CV RIGHT HEART CATH N/A 7/10/2019    Procedure: CV RIGHT HEART CATH;  Surgeon: Jak Mccabe MD;  Location:  HEART CARDIAC CATH LAB     IMPLANT AUTOMATIC IMPLANTABLE CARDIOVERTER DEFIBRILLATOR         REVIEW OF SYSTEMS:   ROS: 10 point ROS neg other than the symptoms noted above in the HPI and here:  Palliative Symptom Review (0=no symptom/no concern, 1=mild, 2=moderate, 3=severe):      Pain: 0      Fatigue: 1-2      Nausea:  0      Constipation: 0      Diarrhea: 0      Depressive Symptoms: 0      Anxiety: 0      Drowsiness: 0      Poor Appetite: 0      Shortness of Breath: 1      Insomnia: 0           Physical Exam:   Vitals were reviewed  Gen: No acute distress, sitting up comfortably in a chair, pleasant and cooperative with interview and exam  HEENT: Normocephalic, no scleral icterus, no perioral lesions appreciated  CV: Appreciate regular rate and rhythm at time of exam  Pulm: Good air movement bilaterally without wheezing  Abd: Positive bowel sounds, soft, nontender to palpation  Skin: No rash on limited exam  LE: Less than 1+ pitting edema in left lower extremity, no edema in right lower extremity  Neuro: Alert and oriented x4, no tremor, gait stable      Data Reviewed:  LABS: 19 creatinine 2.08  IMAGIN/10/19 R heart cath  Conclusion (per Cardiology):  1.  Baseline elevated left sided filling pressures and decreased cardiac output/index with mean arterial pressure of 100 mmHg.  2.  With IV nipride, there was significant improvement of the left sided filling pressures with associated increases in cardiac output and index as described above.  3.  Results of the study were discussed in person with Dr. Sheridan who will follow up with the patient for medication adjustments.       Impressions:  Palliative Performance Score:  70  Decision Making Capacity:  intact    Recommendations & Counseling:  Mr. Tee Henderson is a 63-year-old male with past medical history that includes ischemic cardiomyopathy with consideration of advanced therapies, patient is currently undergoing evaluation with cardiology.  Patient was referred to palliative care clinic as part of evaluation for consideration of advanced therapies up to and including heart transplant.    On evaluation today, no recommendation for medication changes for symptom management, Lucian is feeling reasonably well at present.    Regarding patient understanding of his  healthcare conditions and potential treatments including heart transplant, I am concerned that Lucian does not have a complete understanding of his cardiac history and does not have a good understanding of what treatments including heart transplant would entail.  He reported to me today that he has absolutely no questions about heart transplant including the procedure itself, potential complications of surgery, or what life would be like after transplant.  I encouraged the patient and his wife on several occasions at today's visit to feel empowered to ask any questions they might have and to ask for additional explanation if needed during this evaluation process where he is being considered for therapies like heart transplant.  Advanced care planning conversations were challenging at today's visit, patient and his wife seemed guarded in discussing potential complications of transplant surgery, what he would want should a complication arise, and what a good or reasonable quality of life would be for him.  Recommend moving forward that care teams working with Lucian and his family take great care to thoroughly explain risks and benefits of any intervention recommended and to check in frequently with Lucian and his family to ensure that his decisions moving forward are informed and reflect his wishes for his care.    Plan to have patient follow up as needed in Palliative Care clinic. Patient seen with and care plan discussed with attending physician, Dr. Ramirez, who agreed with above. This progress note has been edited by Dr. Ramirez.    Denita Bueno MD  Hospice and Palliative Medicine Fellow  [phone]    Attending Note:  Patient seen and evaluated with Dr Bueno and I agree with/confirm their findings/recs in this note. ACP related to severe ischemic CM and ACP needs around transplant LVAD as above. We are not sure how much of his situation he understands.      Thank you for involving us in the patient's  care.   Saurabh Ramirez MD / Palliative Medicine / Text me via [employer] - search for 'James' - then click the pager icon / My clinic is in the [facility]: [phone] (scheduling); [phone] (triage). [facility] Inpatient Team Consult Pager [phone] (answered 8am-430pm M-F) - prefer text pages via [employer] / Palliative After-Hours Answering Service [phone].

## 2019-07-18 ENCOUNTER — DOCUMENTATION ONLY (OUTPATIENT)
Dept: DENTISTRY | Facility: CLINIC | Age: 64
End: 2019-07-18

## 2019-07-18 PROBLEM — Z76.82 HEART TRANSPLANT CANDIDATE: Status: ACTIVE | Noted: 2019-07-18

## 2019-07-18 NOTE — PROGRESS NOTES
Dental Service Outpatient Consultation        Lucian Henderson . MRN# [id number]   YOB: 1955 Age: 63 year old              Chief Complaint:   Patient states he his here for an exam clearing him for a heart transplant. Patient denies having any current dental complaints.   History is obtained from the patient       History of Present Illness:   This patient is a 63 year old male who presents to the [facility] for a transplant patient clearance exam. Patient has a complex medical history of CAD, CHF, CKD, hypertension, diabetes, CHANTEL, and ischemic cardiomyopathy. Patient denies any current dental symptoms and states he has not visited a dentist in the last five years.      Location / body area affected - Patient denies having any current dental symptoms           Past Medical History:     Past Medical History:   Diagnosis Date     CAD (coronary artery disease)      CHF (congestive heart failure) (H)      CKD (chronic kidney disease), stage III (H)      Cortical cataract of both eyes      Diabetes (H)      Hyperlipidemia      Hypertension      Ischemic cardiomyopathy      Obesity      CHANTEL (obstructive sleep apnea)      Osteoarthritis              Past Surgical History:     Past Surgical History:   Procedure Laterality Date     C CABG, ARTERY-VEIN, THREE  02/2008     CV RIGHT HEART CATH N/A 3/25/2019    Procedure: CV RIGHT HEART CATH;  Surgeon: Moises Santos MD;  Location:  HEART CARDIAC CATH LAB     CV RIGHT HEART CATH N/A 7/10/2019    Procedure: CV RIGHT HEART CATH;  Surgeon: Jak Mccabe MD;  Location:  HEART CARDIAC CATH LAB     IMPLANT AUTOMATIC IMPLANTABLE CARDIOVERTER DEFIBRILLATOR                 Social History:     Social History     Tobacco Use     Smoking status: Never Smoker     Smokeless tobacco: Never Used     Tobacco comment: Never smoked; non-smoking household   Substance Use Topics     Alcohol use: No     Alcohol/week: 0.0 oz              Family History:     Family History   Problem Relation Age of Onset     Diabetes Brother      Diabetes Sister      Diabetes Sister      Macular Degeneration No family hx of      Glaucoma No family hx of      Myocardial Infarction No family hx of      Kidney Disease No family hx of              Immunizations:     Immunization History   Administered Date(s) Administered     Influenza (IIV3) PF 10/05/2011, 10/15/2012     Influenza Vaccine IM 3yrs+ 4 Valent IIV4 10/27/2015, 09/14/2016, 10/05/2017, 10/03/2018     Pneumococcal 23 valent 08/04/2009, 04/03/2015     TDAP Vaccine (Adacel) 08/04/2009             Allergies:     Allergies   Allergen Reactions     No Known Drug Allergies              Medications:     Current Outpatient Medications Ordered in Epic   Medication     aspirin 81 MG tablet     atorvastatin (LIPITOR) 40 MG tablet     B-D U/F insulin pen needle     blood glucose (ACCU-CHEK SMARTVIEW) test strip     blood glucose monitoring (ACCU-CHEK FELIPE SMARTVIEW) meter device kit     carvedilol (COREG) 25 MG tablet     clopidogrel (PLAVIX) 75 MG tablet     furosemide (LASIX) 20 MG tablet     glimepiride (AMARYL) 4 MG tablet     hydrALAZINE (APRESOLINE) 25 MG tablet     insulin glargine (LANTUS SOLOSTAR) 100 UNIT/ML pen     isosorbide mononitrate (IMDUR) 60 MG 24 hr tablet     losartan (COZAAR) 50 MG tablet     neomycin-polymyxin-dexamethasone (MAXITROL) 3.5-34613-1.1 ophthalmic ointment     nitroglycerin (NITROSTAT) 0.4 MG SL tablet     order for DME     sitagliptin (JANUVIA) 50 MG tablet     Current Facility-Administered Medications Ordered in Epic   Medication Dose Route Frequency Last Rate Last Dose     bevacizumab (AVASTIN) intravitreal inj 1.25 mg  1.25 mg Intravitreal Q28 Days   1.25 mg at 07/03/19 1641     bevacizumab (AVASTIN) intravitreal inj 1.25 mg  1.25 mg Intravitreal Q28 Days   1.25 mg at 07/03/19 1640     erythromycin (ROMYCIN) ophthalmic ointment   Left Eye TID         lidocaine (PF) (XYLOCAINE)  2 % injection 10 mL  10 mL Other Once         sodium chloride (PF) 0.9% PF flush 10 mL  10 mL Intracatheter Once                Review of Systems:   Constitutional: negative for weight loss/gain, fatigue, fever/chills, sweats, headache  Skin: negative, negative for  Eyes: negative, double vision, photophobia, redness, tearing, itching  Ears/Nose/Throat: positive for bleeding gums, dental problem, negative for, purulent rhinorrhea, hearing loss, deafness,  Neurologic: negative for, paralysis, involuntary movements and speech problems          Physical Exam:   Constitutional  Vital signs (3 of these items)  BP: 114/75  Pulse: 72  General appearance: good  Ability to communicate/voice quality: good  Constitutional: healthy, alert and no distress  Head: Normocephalic. No masses, lesions, tenderness or abnormalities  Neck: Neck supple. No adenopathy. Thyroid symmetric, normal size,, Carotids without bruits.  No swelling, masses or cervical lymphadenopathy.   TMJ: No trismus, popping, crepitus, or tenderness to palpation. Patient has bilateral clicking but denies any pain when palpated or history of pain in the jaw joint.       Soft Tissue exam:  Tongue, FOM, buccal mucosa, labial mucosa, hard and soft palate, and oropharynx are normal in appearance.   Gingiva: Generalized moderate gingivitis with areas of chronic moderate localized periodontal disease    Hard Tissue exam: Patient has a carious lesion on the distal of the right first mandibular molar that is evident on the BW radiograph. Retained root tips maxillary left first premolar and mandibular right second premolar. No other carious lesions noted.     ENT: ENT exam normal, no neck nodes or sinus tenderness  Neurologic: Gait normal. Reflexes normal and symmetric. Sensation grossly WNL.           Data:   Radiographic interpretation: Panoramic radiograph, 4 BWx and 3 PA radiographs were taken and interpreted.   Date: 7/18/2019  I have personally reviewed and  interpreted the images and discussed with: Dr. Donald VIZCARRA, Dr. Ana LIS, Dr. Prisca Blanc DDS  Osseous pathology: None noted   Pulpal pathology: Low density area around the apex of maxillary left first premolar indicating apical periodontitis. The trabecular bone around the apex of the distal root of right first mandibular molar has irregular density, indicating previous sclerosing osteitis.   Periodontal Pathology: Patient has generalized mild horizontal bone loss with areas of localized moderate bone loss. Patient has radiographic calculus present in the UL quadrant.  Caries: RL lesion on the distal of right first mandibular molar indicative of caries. Mandibular right second premolar is missing the majority of the crown due to caries. Maxillary left first premolar is carious.  Odontogenic pathology: Impacted third right molars. Radiolucent area on the distal aspect of the right mandibular third molar superimposed with the oropharyngeal airway should be monitored at future appointments to rule out the development of a dentigerous cyst.         Assessment and Plan:   Assessment: Patient has complex dental needs that require multiple therapies including but not limited to extractions, periodontal scaling and root planing, restorative work, and prophylactic follow up.     Plan: Patient is not dentally cleared for heart transplant at this time and is recommended to complete his dental treatment plan prior to transplantation.       The patient was seen by and discussed with: Dr. Donald VIZCARRA, Dr. Glory VIZCARRA, Dr. Ana VIZCARRA      Next Appointment: Patient will call to schedule an appointment for finalization of treatment plan and the first phase of treatment.  Date: TBD    Time: TBD  Procedure: TBD, patient will decide which part of the treatment plan he wishes to pursue.           Maurice Baker DDS  PGY 1  Pager: [phone]

## 2019-07-18 NOTE — PROGRESS NOTES
"  Palliative Care Outpatient Clinic Consultation Note    Patient:  Lucian Henderson Sr.    Chief Complaint:   Lucian Henderson Sr. 63 year old male with PMH that includes coronary artery disease status post bypass surgery in 2015, CKD stage III, diabetes mellitus and ischemic cardiomyopathy with consideration of advanced therapies including heart transplant. Patient is presenting to the Palliative Medicine clinic today at the request of Dr. Grimes for a palliative care consultation secondary to ischemic cardiomyopathy and consideration of advanced cardiac therapies.   The patient's primary care provider is:  Suyapa Hatfield.     History of Present Illness:  Mr. Tee Henderson presents to palliative care clinic today to establish care and per the request of his cardiologist as part of evaluation for advanced therapies and consideration of heart transplant for a history of ischemic cardiomyopathy.  Patient is present today with his wife Shivani.  Patient visit today was completed with a Citizen of the [place]  present.    Patient reports that he has been feeling fairly well, he tries to stay active and goes to the gym on a regular basis where he will walk on a treadmill, ride a stationary bike, or swim for about 25 to 30 minutes.  He reports he is able to do these activities if he carefully paces himself so that his breathing is comfortable, he notes that if he \"pushes it\" that he will become more short of breath.  Patient reports that he feels he can reasonably tolerate walking on level ground but walking up an incline is difficult and he has to stop to rest for about 4 minutes and catch his breath before he can continue.  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  Asked patient what a typical day looks like for him, he reports that he is usually either at home or at the gym and when he is at home he does small chores around the house such as taking out the garbage or washing the dishes.    Patient denies pain, denies orthopnea or PND symptoms, reports mild left leg swelling but reports that this is not new and is in fact better than it has been in the past and he reports asymmetric leg swelling ever since his bypass surgery in 2015 as he had vein harvest from his left lower extremity.  Patient reports appetite is good and jokes with this provider that perhaps it is \"too good\" as he is trying to lose weight.  No nausea, constipation or diarrhea reported.    Patient did not seem to understand that LVAD was being considered for him, and instead spoke only about being considered for possible heart transplant.    Patient s legally designated health care agent: Patient designates his wife Shivani is his healthcare surrogate decision-maker.    Patient s description of how they make decisions (by themselves, in consultation with certain close loved ones, based on advice from medical professionals, etc): Patient appears to make decisions talking with his wife and based heavily on advice from medical professionals.    Patient s personal goals/hopes after heart transplant: I asked patient about his goals and hopes after receiving a transplant and and he did not articulate specific goals or hopes but rather asked this provider \"will I feel better after transplant?\"    Patient s worries/concerns when considering receiving heart transplant: I asked the patient and his wife about any worries or concerns and they were not able to articulate specific worries or concerns with this provider, however, his wife became somewhat tearful when this provider offered that it can feel overwhelming or scary to consider a significant surgery such as a " heart transplant.  How does the patient envision or anticipate his/her future with heart transplant?  I asked the patient about what he envisioned or anticipated post transplant and he was not able to name anything specific.  Patient s thoughts about what constitutes a  good  quality of life: Patient seemed somewhat reluctant to discuss what a good quality of life means to him, he did tell me it is important to be home and to be with family.    Patient s thoughts about what health conditions would be unacceptable (situations the patient would want her/his doctors and loved ones to know that she/he would not want life prolonged)?  It was difficult to elucidate at today's visit what patient would consider to be an unacceptable health condition or quality of life.  I encouraged he and his family to talk about what his goals are for his health and what is important to him in order to have a good or acceptable quality of life.    After a stroke, what sort of functional outcomes would be acceptable vs unacceptable to the patient?  This was difficult to elucidate at today's visit, patient again noted that being home was his goal.     Chronic mechanical ventilation via a tracheostomy tube is a risk. What are the circumstances the patient would want her/his physicians and family to keep them alive with long-term mechanical ventilation vs allow them to die?  Discussed with patient and his wife what tracheostomy is and that tracheostomy is something that is considered if the patient requires an extended period of time with mechanical ventilation, for example postoperatively.  Patient was not able to tell me at today's visit whether he would consider tracheostomy should he require prolonged mechanical ventilation postoperatively.      Distressing Symptom/s:  None currently, see HPI for discussion of respiratory symptoms.    Patient's Disease Understanding: I am concerned that patient does not have a thorough understanding of  his cardiac history and also what advanced therapies up to and including heart transplant would entail. The patient and his wife note that they have no questions about heart transplant including the procedure itself, potential complications, or what clinical course and life post transplant would be like. I encouraged the patient and his wife on several occasions today to ask questions and asked for clarification for anything that they do not understand.      Coping: Patient seems to be coping reasonably well at this time.    Social History  Living Situation: Patient lives in the community with his wife and a dog.  Patient has 3 children  Children: Including 2 adult sons and a daughter, when asked who could help support he and his wife after surgery such as heart transplant patient noted he has 2 sons who would help him and his wife, he did not identify his daughter as a support for he and his wife postoperatively.  Actual/Potential Caregiver(s): Wife, adult children  Support System: Family  Occupation: Retired, used to work for a company making [employer]  Hobbies: Patient previously enjoyed working on cars but is no longer able to do this, he does enjoy going to the gym and does this on a nearly daily basis.  Patient denies  Substance Use/History of misuse: Patient denies tobacco use, alcohol use, or recreational drug use ever.  Financial Concerns: None reported  Spiritual Background: Spiritual background not identified today, however patient notes that he and his family do attend [employer] regularly  Spiritual Concerns/Needs: None reported  Social History     Tobacco Use     Smoking status: Never Smoker     Smokeless tobacco: Never Used     Tobacco comment: Never smoked; non-smoking household   Substance Use Topics     Alcohol use: No     Alcohol/week: 0.0 oz     Drug use: No       Family History  Family History   Problem Relation Age of Onset     Diabetes Brother      Diabetes Sister      Diabetes Sister      Macular  Degeneration No family hx of      Glaucoma No family hx of      Myocardial Infarction No family hx of      Kidney Disease No family hx of      Patient's Involvement with Prior History of Serious Illness in Family: None reported    Advance Care Planning:  Advance Directive:    Discussed advanced directive completion with patient today, he did not wish to complete paperwork at today's visit but does designate his wife Shivani as his healthcare surrogate decision-maker.  Health Care Agent Contact Information: Patient verbally designates his wife Shivani as his healthcare surrogate decision-maker.  POLST:   Not on file    Allergies   Allergen Reactions     No Known Drug Allergies      Current Outpatient Medications   Medication Sig Dispense Refill     aspirin 81 MG tablet Take 1 tablet (81 mg) by mouth daily 30 tablet 11     atorvastatin (LIPITOR) 40 MG tablet Take 1 tablet (40 mg) by mouth daily 90 tablet 3     B-D U/F insulin pen needle USE 1 NEEDLE DAILY AS DIRECTED -DUE FOR APPOINTMENT FOR FURTHER REFILLS 100 each 0     blood glucose (ACCU-CHEK SMARTVIEW) test strip USE TO TEST THREE TIMES DAILY AS DIRECTED 100 strip 0     blood glucose monitoring (ACCU-CHEK FELIPE SMARTVIEW) meter device kit Use to test blood sugar 3-4 times daily, as directed. 1 kit 0     carvedilol (COREG) 25 MG tablet Take 0.5 tablets (12.5 mg) by mouth 2 times daily (with meals) 180 tablet 1     clopidogrel (PLAVIX) 75 MG tablet Take 1 tablet (75 mg) by mouth daily 90 tablet 3     furosemide (LASIX) 20 MG tablet Take 1 tablet (20 mg) by mouth daily 90 tablet 1     glimepiride (AMARYL) 4 MG tablet Take 1 tablet (4 mg) by mouth every morning (before breakfast) 180 tablet 3     hydrALAZINE (APRESOLINE) 25 MG tablet Take 1 tablet (25 mg) by mouth 3 times daily 270 tablet 1     insulin glargine (LANTUS SOLOSTAR) 100 UNIT/ML pen Take 8 units in the morning and 40 units in the evening 45 mL 3     isosorbide mononitrate (IMDUR) 60 MG 24 hr tablet Take 1  tablet (60 mg) by mouth daily 90 tablet 1     losartan (COZAAR) 50 MG tablet Take 1 tablet (50 mg) by mouth daily 90 tablet 3     neomycin-polymyxin-dexamethasone (MAXITROL) 3.5-89293-2.1 ophthalmic ointment Place 0.5 inches into both eyes At Bedtime 1 Tube 3     nitroglycerin (NITROSTAT) 0.4 MG SL tablet Place 1 tablet (0.4 mg) under the tongue every 5 minutes as needed for chest pain If you are still having symptoms after 3 doses (15 minutes) call 911. 10 tablet 0     order for DME Auto CPAP 8-15 cmH20 1 Units 0     sitagliptin (JANUVIA) 50 MG tablet Take 1 tablet (50 mg) by mouth daily 90 tablet 5     Past Medical History:   Diagnosis Date     CAD (coronary artery disease)      CHF (congestive heart failure) (H)      CKD (chronic kidney disease), stage III (H)      Cortical cataract of both eyes      Diabetes (H)      Hyperlipidemia      Hypertension      Ischemic cardiomyopathy      Obesity      CHANTEL (obstructive sleep apnea)      Osteoarthritis      Past Surgical History:   Procedure Laterality Date     C CABG, ARTERY-VEIN, THREE  02/2008     CV RIGHT HEART CATH N/A 3/25/2019    Procedure: CV RIGHT HEART CATH;  Surgeon: Moises Santos MD;  Location:  HEART CARDIAC CATH LAB     CV RIGHT HEART CATH N/A 7/10/2019    Procedure: CV RIGHT HEART CATH;  Surgeon: Jak Mccabe MD;  Location:  HEART CARDIAC CATH LAB     IMPLANT AUTOMATIC IMPLANTABLE CARDIOVERTER DEFIBRILLATOR         REVIEW OF SYSTEMS:   ROS: 10 point ROS neg other than the symptoms noted above in the HPI and here:  Palliative Symptom Review (0=no symptom/no concern, 1=mild, 2=moderate, 3=severe):      Pain: 0      Fatigue: 1-2      Nausea: 0      Constipation: 0      Diarrhea: 0      Depressive Symptoms: 0      Anxiety: 0      Drowsiness: 0      Poor Appetite: 0      Shortness of Breath: 1      Insomnia: 0           Physical Exam:   Vitals were reviewed  Gen: No acute distress, sitting up comfortably in a chair, pleasant and cooperative with  interview and exam  HEENT: Normocephalic, no scleral icterus, no perioral lesions appreciated  CV: Appreciate regular rate and rhythm at time of exam  Pulm: Good air movement bilaterally without wheezing  Abd: Positive bowel sounds, soft, nontender to palpation  Skin: No rash on limited exam  LE: Less than 1+ pitting edema in left lower extremity, no edema in right lower extremity  Neuro: Alert and oriented x4, no tremor, gait stable      Data Reviewed:  LABS: 19 creatinine 2.08  IMAGIN/10/19 R heart cath  Conclusion (per Cardiology):  1.  Baseline elevated left sided filling pressures and decreased cardiac output/index with mean arterial pressure of 100 mmHg.  2.  With IV nipride, there was significant improvement of the left sided filling pressures with associated increases in cardiac output and index as described above.  3.  Results of the study were discussed in person with Dr. Sheridan who will follow up with the patient for medication adjustments.       Impressions:  Palliative Performance Score:  70  Decision Making Capacity:  intact    Recommendations & Counseling:  Mr. Tee Henderson is a 63-year-old male with past medical history that includes ischemic cardiomyopathy with consideration of advanced therapies, patient is currently undergoing evaluation with cardiology.  Patient was referred to palliative care clinic as part of evaluation for consideration of advanced therapies up to and including heart transplant.    On evaluation today, no recommendation for medication changes for symptom management, Lucian is feeling reasonably well at present.    Regarding patient understanding of his healthcare conditions and potential treatments including heart transplant, I am concerned that Lucian does not have a complete understanding of his cardiac history and does not have a good understanding of what treatments including heart transplant would entail.  He reported to me today that he has absolutely no  questions about heart transplant including the procedure itself, potential complications of surgery, or what life would be like after transplant.  I encouraged the patient and his wife on several occasions at today's visit to feel empowered to ask any questions they might have and to ask for additional explanation if needed during this evaluation process where he is being considered for therapies like heart transplant.  Advanced care planning conversations were challenging at today's visit, patient and his wife seemed guarded in discussing potential complications of transplant surgery, what he would want should a complication arise, and what a good or reasonable quality of life would be for him.  Recommend moving forward that care teams working with Lucian and his family take great care to thoroughly explain risks and benefits of any intervention recommended and to check in frequently with Lucian and his family to ensure that his decisions moving forward are informed and reflect his wishes for his care.    Plan to have patient follow up as needed in Palliative Care clinic. Patient seen with and care plan discussed with attending physician, Dr. Ramirez, who agreed with above. This progress note has been edited by Dr. Ramirez.    Denita Bueno MD  Hospice and Palliative Medicine Fellow  [phone]    Attending Note:  Patient seen and evaluated with Dr Bueno and I agree with/confirm their findings/recs in this note. ACP related to severe ischemic CM and ACP needs around transplant LVAD as above. We are not sure how much of his situation he understands.      Thank you for involving us in the patient's care.   Saurabh Ramirez MD / Palliative Medicine / Text me via [employer] - search for 'James' - then click the pager icon / My clinic is in the [facility]: [phone] (scheduling); [phone] (triage). [facility] Inpatient Team Consult Pager [phone] (answered 8am-430pm M-F) - prefer text pages via [email or web address] /  Palliative After-Hours Answering Service [phone].

## 2019-07-29 ENCOUNTER — OFFICE VISIT (OUTPATIENT)
Dept: CARDIOLOGY | Facility: CLINIC | Age: 64
End: 2019-07-29
Attending: SURGERY
Payer: COMMERCIAL

## 2019-07-29 VITALS
WEIGHT: 205.7 LBS | DIASTOLIC BLOOD PRESSURE: 73 MMHG | BODY MASS INDEX: 34.27 KG/M2 | HEART RATE: 84 BPM | OXYGEN SATURATION: 97 % | HEIGHT: 65 IN | SYSTOLIC BLOOD PRESSURE: 128 MMHG

## 2019-07-29 DIAGNOSIS — I50.22 CHRONIC SYSTOLIC CONGESTIVE HEART FAILURE (H): Primary | ICD-10-CM

## 2019-07-29 PROCEDURE — G0463 HOSPITAL OUTPT CLINIC VISIT: HCPCS | Mod: ZF

## 2019-07-29 PROCEDURE — T1013 SIGN LANG/ORAL INTERPRETER: HCPCS | Mod: U3,ZF

## 2019-07-29 ASSESSMENT — MIFFLIN-ST. JEOR: SCORE: 1644.93

## 2019-07-29 ASSESSMENT — PAIN SCALES - GENERAL: PAINLEVEL: NO PAIN (0)

## 2019-07-29 NOTE — NURSING NOTE
Chief Complaint   Patient presents with     New Patient     new     Vitals were taken and medications were reconciled.     Varsha Gallardo  4:01 PM

## 2019-07-29 NOTE — LETTER
7/29/2019      RE: Lucian Henderson Sr.  [address] [address] [address]       Dear Colleague,    Thank you for the opportunity to participate in the care of your patient, Lucian Henderson Sr., at the [facility] HEART [facility] at [facility]. Please see a copy of my visit note below.    Service Date: 07/29/2019      I was requested to see Mr. Tee Henderson for evaluation of options for end-stage heart failure.      HISTORY OF PRESENT ILLNESS:  The patient is a pleasant 63-year-old gentleman with a past medical history of coronary artery disease, status post coronary bypass grafting with worsening shortness of breath, fatigue and tiredness over the last several months.  His effort tolerance is limited to less than 1 to 1-1/2 blocks.  He complains of some lower extremity swelling and weight gain.  Denied any fever, chills, syncope, palpitation, loss of consciousness.      MEDICATIONS:  Aspirin, Lipitor, Plavix, Lasix, Amaryl, prednisone, nitroglycerin and Januvia.      SOCIAL HISTORY:  Denies current alcohol, drug or tobacco abuse.      REVIEW OF SYSTEMS:  A 10-point review of systems normal other than mentioned in history and physical.      FAMILY HISTORY:  Reviewed.      ALLERGIES:  Reviewed.      PHYSICAL EXAMINATION:   VITAL SIGNS:  He is afebrile, blood pressure 100/74, heart rate 68.   GENERAL:  He is awake, alert, oriented, comfortable at rest.   CARDIOVASCULAR SYSTEM:  S1, S2 normal, no S3.  No murmurs.   RESPIRATORY:  Bibasilar rhonchi or crepitations.   ABDOMEN:  Bowel sounds nontender.   LOWER EXTREMITIES:  Warm.  No pedal edema.   NEUROLOGIC:  No focal deficits.   EYES:  normal  DERMATOLOGICAL:  Within normal limits.      LABORATORY DATA:  Electrolytes within normal limits, creatinine 1.84, white cell count within normal limits.  I reviewed his echocardiogram performed in 03/2019 that showed left ventricular wall thickness normal.  Moderate to severe  left ventricular dysfunction, ejection fraction 10%-20%.  No significant valvular abnormalities.  Left ventricular end-diastolic is 6.5 cm.  His cardiopulmonary stress test shows a peak VO2 of 10.2 mL/kg/minute.  His right heart cath shows moderately reduced cardiac index with normal biventricular filling pressures.      ASSESSMENT AND PLAN:  A 63-year-old gentleman with past medical history of hypertension, hyperlipidemia, diabetes, coronary artery disease, status post coronary artery bypass grafting, who is being evaluated for end-stage heart failure options.  I did discuss with him the risks and benefits of both heart transplant and LVAD including risk of death, stroke, bleeding, wound infection, renal failure.  He understands and is willing to consider this.  He is currently undergoing his complete workup.  He will also need a CT scan of his chest as he has had previous cardiac surgery.  If he also does an elective VAD, he will need Plavix to be stopped.  I have discussed plan with Dr. Sheridan.  If there are any questions, give contact me.      cc:   Arvin Sheridan MD   [facility] [address]      [facility] Billing   [facility] [address]         MILES TANNER MD             D: 2019   T: 2019   MT: al      Name:     BUCK CABAN   MRN:      [id number]        Account:      [id number]   :      1955           Service Date: 2019      Document: [id number]

## 2019-07-31 LAB
RESULT: NORMAL
SEND OUTS MISC TEST CODE: NORMAL
SEND OUTS MISC TEST SPECIMEN: NORMAL
TEST NAME: NORMAL

## 2019-08-01 ENCOUNTER — OFFICE VISIT (OUTPATIENT)
Dept: OPHTHALMOLOGY | Facility: CLINIC | Age: 64
End: 2019-08-01
Attending: OPHTHALMOLOGY
Payer: COMMERCIAL

## 2019-08-01 ENCOUNTER — TELEPHONE (OUTPATIENT)
Dept: GASTROENTEROLOGY | Facility: CLINIC | Age: 64
End: 2019-08-01

## 2019-08-01 DIAGNOSIS — E11.3513 TYPE 2 DIABETES MELLITUS WITH PROLIFERATIVE RETINOPATHY OF BOTH EYES AND MACULAR EDEMA, UNSPECIFIED WHETHER LONG TERM INSULIN USE (H): Primary | ICD-10-CM

## 2019-08-01 DIAGNOSIS — Z12.11 SPECIAL SCREENING FOR MALIGNANT NEOPLASMS, COLON: Primary | ICD-10-CM

## 2019-08-01 PROCEDURE — G0463 HOSPITAL OUTPT CLINIC VISIT: HCPCS | Mod: ZF

## 2019-08-01 PROCEDURE — 25000128 H RX IP 250 OP 636: Mod: ZF | Performed by: OPHTHALMOLOGY

## 2019-08-01 PROCEDURE — 67028 INJECTION EYE DRUG: CPT | Mod: RT,ZF | Performed by: OPHTHALMOLOGY

## 2019-08-01 PROCEDURE — C9257 BEVACIZUMAB INJECTION: HCPCS | Mod: ZF | Performed by: OPHTHALMOLOGY

## 2019-08-01 PROCEDURE — 92134 CPTRZ OPH DX IMG PST SGM RTA: CPT | Mod: ZF | Performed by: OPHTHALMOLOGY

## 2019-08-01 RX ORDER — BISACODYL 5 MG/1
TABLET, DELAYED RELEASE ORAL
Qty: 4 TABLET | Refills: 0 | Status: SHIPPED | OUTPATIENT
Start: 2019-08-01 | End: 2019-08-26

## 2019-08-01 RX ADMIN — Medication 1.25 MG: at 14:55

## 2019-08-01 ASSESSMENT — VISUAL ACUITY
OD_SC: 20/40
OD_SC+: -2
OS_SC: 20/200
METHOD: SNELLEN - LINEAR

## 2019-08-01 ASSESSMENT — TONOMETRY
OS_IOP_MMHG: 10
IOP_METHOD: ICARE
OD_IOP_MMHG: 13

## 2019-08-01 ASSESSMENT — SLIT LAMP EXAM - LIDS
COMMENTS: PTOSIS OD>OS
COMMENTS: PTOSIS OD>OS

## 2019-08-01 ASSESSMENT — CUP TO DISC RATIO
OS_RATIO: 0.2
OD_RATIO: 0.2

## 2019-08-01 ASSESSMENT — CONF VISUAL FIELD
OD_NORMAL: 1
METHOD: COUNTING FINGERS
OS_NORMAL: 1

## 2019-08-01 ASSESSMENT — EXTERNAL EXAM - RIGHT EYE: OD_EXAM: NORMAL

## 2019-08-01 ASSESSMENT — EXTERNAL EXAM - LEFT EYE: OS_EXAM: NORMAL

## 2019-08-01 NOTE — PROGRESS NOTES
CC: s/p PRP left eye 6/5/29, PRP right eye 6/12/19    Interval Update: reports doing better, no interval changes. No distortion in vision each eye. No flashes, floaters, shadow/curtain over vision.  Reports some changes in vision left eye     HPI: Two weeks prior to presentation to retina clinic, pt woke up and suddenly had blurred vision in both eyes. He went to the ED for possible stroke workup but was found to have count fingers visual acuity. He was evaluated by Dr. Ronak Perez and found to have bilateral vitreous hemorrhages.    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 No neovascularization of the disc. Microaneurysms   - left eye: neovascularization elsewhere, severe peripheral ischemia. (+) neovascularization of the disc. Microaneurysms     optos pic consistent with exam  optos Autofluorescence hypoautofluorescence of the hemes    Optical Coherence Tomography 08/01/19   - RE: vitreous opacities; rare cystic changes    - LE: Epiretinal membrane. Stable IRF.     Prior Extramacular Optical Coherence Tomography showed tractional retinal detachment about 4DD inferior to the macula and another Tractional retinal detachment supero-temporal quadrant left eye     Assessment and Plan  1. Proliferative diabetic retinopathy with vitreous hemorrhages, both eyes   S/p PRP left eye on 6/5/19    S/p PRP right eye on 6/12/19   S/p Avastin both eyes 5/30/19     - Localized outside macula Tractional retinal detachment in left eye  - Urgent vitrectomy not necessary at this point for left  eye but will reassess at intervals given his cardiorespiratory comorbidities make general anesthesia very high risk.   - today with decreased VA left eye - likely Dry eyes; no changes on Optical Coherence Tomography. Will reassess next follow up with prescription     2. Nuclear sclerotic and cortical cataracts, both eyes  - will need cataract evaluation     3. Epiretinal membrane left eye   - will consider surgery in the future    return to clinic: 1 month for avastin inj both eyes - with Optical Coherence Tomography. Inf cuts of the macula left eye   And prescription     Tacos Haq MD  Ophthalmology Resident, PGY-3  [facility]      ~~~~~~~~~~~~~~~~~~~~~~~~~~~~~~~~~~   Complete documentation of historical and exam elements from today's encounter can be found in the full encounter summary report (not reduplicated in this progress note).  I personally obtained the chief complaint(s) and history of present illness.  I confirmed and edited as necessary the review of systems, past medical/surgical history, family history, social history, and examination findings as documented by others; and I examined the patient myself.  I personally reviewed the relevant tests, images, and reports as documented above.  I personally reviewed the ophthalmic test(s) associated with this encounter, agree with the interpretation(s) as documented by the resident/fellow, and have edited the corresponding report(s) as necessary.   I formulated and edited as necessary the assessment and plan and discussed the findings and management plan with the patient and family and I was present for the entire procedure performed by the resident/fellow.    Triny Bunch MD   of Ophthalmology.  Retina Service   Department of Ophthalmology and Visual Neurosciences   [facility]  Phone: [phone]   Fax: [phone]

## 2019-08-01 NOTE — TELEPHONE ENCOUNTER
Patient Name: Lucian Henderson .   : 1955  MRN: [id number]       : [] N/A   [x] Yes:  Language  Australian/      ID:  [id number]  services     Additional Information regarding appointment:      Patient scheduled for:  [] EGD  [x] Colonoscopy  [] EUS  [] Flex Sig   [] Other:      Indication for procedure. [] Screening   [x]  Systolic heart failure (H) [I50.20]  - Primary    Sedation Type: [x] Conscious Sedation   [] MAC   [] None    Procedure Provider:  Dr. Morataya       Referring Provider. Arvin Sheridan    Arrival time verified: 9:25 am / Wednesday  /     Facility location verified:   [x][facility] - [address], 1st Floor, Rm 1-301    Pt meets medical necessity for outpatient procedure in hospital Endoscopy Unit:      [] N/A for this Payor (non-BCBS)  [] [facility], 5th floor     [][facility] - [address], 3rd Floor Surgery check-in      Prep Type:   [x]Golytely eRx: Sent to [address], [place], [place]; [phone] ;  [] MoviPrep:  , [] MiraLax:  , [] Fleet x 2:    []NPO /p [place], No solid food /p 2200 the night before    Anticoagulants or blood thinners: []None [x] ASA 81mg  - may continue           [] Warfarin   [] Warfarin + Lovenox bridge   [x] Plavix  [x] Other LAST anticoagulant dose: Date/Time:      Electronic implanted devices: [x] No  [] IPG  []  ICD  []  LVAD  []      H&P / Pre op physical completed: [x] N/A, [] Complete, Date  , [] Scheduled, Date  , [] No,      Additional Information:      _______________________________________________    Instructions given Patient will be coming to Endoscopy clinic to  instructions in Australian language.        Pre procedure teaching completed: [x] Yes - Reviewed, [] No,      [x] No questions regarding Sedation as ordered, []          Transportation from procedure & responsible adult to be with patient following  procedure for a minimum of 6 hrs (Conscious Sedation) 24 hrs (MAC): [x] Yes   - confirmed will have post-procedure companionship as required, [] Pending  , [] No      Rema Cheema RN  [facility] Endoscopy

## 2019-08-01 NOTE — LETTER
8/1/2019       RE: Lucian Henderson Sr.  [address] [address]     Dear Colleague,    Thank you for referring your patient, Lucian Henderson Sr., to the EYE CLINIC at [facility]. Please see a copy of my visit note below.    CC: s/p PRP left eye 6/5/29, PRP right eye 6/12/19    Interval Update: reports doing better, no interval changes. No distortion in vision each eye. No flashes, floaters, shadow/curtain over vision.  Reports some changes in vision left eye     HPI: Two weeks prior to presentation to retina clinic, pt woke up and suddenly had blurred vision in both eyes. He went to the ED for possible stroke workup but was found to have count fingers visual acuity. He was evaluated by Dr. Ronak Perez and found to have bilateral vitreous hemorrhages.    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 No neovascularization of the disc. Microaneurysms   - left eye: neovascularization elsewhere, severe peripheral ischemia. (+) neovascularization of the disc. Microaneurysms     optos pic consistent with exam  optos Autofluorescence hypoautofluorescence of the hemes    Optical Coherence Tomography 08/01/19   - RE: vitreous opacities; rare cystic changes    - LE: Epiretinal membrane. Stable IRF.     Prior Extramacular Optical Coherence Tomography showed tractional retinal detachment about 4DD inferior to the macula and another Tractional retinal detachment supero-temporal quadrant left eye     Assessment and  Plan  1. Proliferative diabetic retinopathy with vitreous hemorrhages, both eyes   S/p PRP left eye on 6/5/19    S/p PRP right eye on 6/12/19   S/p Avastin both eyes 5/30/19     - Localized outside macula Tractional retinal detachment in left eye  - Urgent vitrectomy not necessary at this point for left eye but will reassess at intervals given his cardiorespiratory comorbidities make general anesthesia very high risk.   - today with decreased VA left eye - likely Dry eyes; no changes on Optical Coherence Tomography. Will reassess next follow up with prescription     2. Nuclear sclerotic and cortical cataracts, both eyes  - will need cataract evaluation     3. Epiretinal membrane left eye   - will consider surgery in the future    return to clinic: 1 month for avastin inj both eyes - with Optical Coherence Tomography. Inf cuts of the macula left eye   And prescription     Tacos Haq MD  Ophthalmology Resident, PGY-3  [facility]      ~~~~~~~~~~~~~~~~~~~~~~~~~~~~~~~~~~   Complete documentation of historical and exam elements from today's encounter can be found in the full encounter summary report (not reduplicated in this progress note).  I personally obtained the chief complaint(s) and history of present illness.  I confirmed and edited as necessary the review of systems, past medical/surgical history, family history, social history, and examination findings as documented by others; and I examined the patient myself.  I personally reviewed the relevant tests, images, and reports as documented above.  I personally reviewed the ophthalmic test(s) associated with this encounter, agree with the interpretation(s) as documented by the resident/fellow, and have edited the corresponding report(s) as necessary.   I formulated and edited as necessary the assessment and plan and discussed the findings and management plan with the patient and family and I was present for the entire procedure performed by the  resident/fellow.    Triny Bunch MD   of Ophthalmology.  Retina Service   Department of Ophthalmology and Visual Neurosciences   [facility]  Phone: [phone]   Fax: [phone]       Again, thank you for allowing me to participate in the care of your patient.      Sincerely,    Triny Bunch M.D.   of Ophthalmology  Vitreoretinal Surgeon  [facility]  Department of Ophthalmology & Visual Neurosciences  [facility]  Phone:  [phone]   Fax:  [phone]

## 2019-08-07 ENCOUNTER — OFFICE VISIT (OUTPATIENT)
Dept: INTERPRETER SERVICES | Facility: CLINIC | Age: 64
End: 2019-08-07
Payer: COMMERCIAL

## 2019-08-07 ENCOUNTER — HOSPITAL ENCOUNTER (OUTPATIENT)
Facility: CLINIC | Age: 64
Discharge: HOME | End: 2019-08-07
Attending: INTERNAL MEDICINE | Admitting: INTERNAL MEDICINE
Payer: COMMERCIAL

## 2019-08-07 VITALS
RESPIRATION RATE: 16 BRPM | SYSTOLIC BLOOD PRESSURE: 122 MMHG | DIASTOLIC BLOOD PRESSURE: 75 MMHG | HEART RATE: 75 BPM | OXYGEN SATURATION: 96 %

## 2019-08-07 LAB
COLONOSCOPY: NORMAL
GLUCOSE BLDC GLUCOMTR-MCNC: 159 MG/DL (ref 70–99)
GLUCOSE BLDC GLUCOMTR-MCNC: 78 MG/DL (ref 70–99)

## 2019-08-07 PROCEDURE — 45385 COLONOSCOPY W/LESION REMOVAL: CPT | Mod: PT | Performed by: INTERNAL MEDICINE

## 2019-08-07 PROCEDURE — 45380 COLONOSCOPY AND BIOPSY: CPT | Performed by: INTERNAL MEDICINE

## 2019-08-07 PROCEDURE — 88305 TISSUE EXAM BY PATHOLOGIST: CPT | Performed by: INTERNAL MEDICINE

## 2019-08-07 PROCEDURE — 25000128 H RX IP 250 OP 636: Performed by: INTERNAL MEDICINE

## 2019-08-07 PROCEDURE — 25000132 ZZH RX MED GY IP 250 OP 250 PS 637: Performed by: INTERNAL MEDICINE

## 2019-08-07 PROCEDURE — 99153 MOD SED SAME PHYS/QHP EA: CPT | Performed by: INTERNAL MEDICINE

## 2019-08-07 PROCEDURE — T1013 SIGN LANG/ORAL INTERPRETER: HCPCS | Mod: U3

## 2019-08-07 PROCEDURE — 82962 GLUCOSE BLOOD TEST: CPT

## 2019-08-07 PROCEDURE — G0500 MOD SEDAT ENDO SERVICE >5YRS: HCPCS | Performed by: INTERNAL MEDICINE

## 2019-08-07 RX ORDER — FENTANYL CITRATE 50 UG/ML
50 INJECTION, SOLUTION INTRAMUSCULAR; INTRAVENOUS
Status: DISCONTINUED | OUTPATIENT
Start: 2019-08-07 | End: 2019-08-07 | Stop reason: HOSPADM

## 2019-08-07 RX ORDER — FLUMAZENIL 0.1 MG/ML
0.2 INJECTION, SOLUTION INTRAVENOUS
Status: DISCONTINUED | OUTPATIENT
Start: 2019-08-07 | End: 2019-08-07 | Stop reason: HOSPADM

## 2019-08-07 RX ORDER — LIDOCAINE 40 MG/G
CREAM TOPICAL
Status: DISCONTINUED | OUTPATIENT
Start: 2019-08-07 | End: 2019-08-07 | Stop reason: HOSPADM

## 2019-08-07 RX ORDER — FENTANYL CITRATE 50 UG/ML
INJECTION, SOLUTION INTRAMUSCULAR; INTRAVENOUS PRN
Status: DISCONTINUED | OUTPATIENT
Start: 2019-08-07 | End: 2019-08-07 | Stop reason: HOSPADM

## 2019-08-07 RX ORDER — ONDANSETRON 4 MG/1
4 TABLET, ORALLY DISINTEGRATING ORAL EVERY 6 HOURS PRN
Status: DISCONTINUED | OUTPATIENT
Start: 2019-08-07 | End: 2019-08-07 | Stop reason: HOSPADM

## 2019-08-07 RX ORDER — ONDANSETRON 2 MG/ML
4 INJECTION INTRAMUSCULAR; INTRAVENOUS EVERY 6 HOURS PRN
Status: DISCONTINUED | OUTPATIENT
Start: 2019-08-07 | End: 2019-08-07 | Stop reason: HOSPADM

## 2019-08-07 RX ORDER — SIMETHICONE
LIQUID (ML) MISCELLANEOUS PRN
Status: DISCONTINUED | OUTPATIENT
Start: 2019-08-07 | End: 2019-08-07 | Stop reason: HOSPADM

## 2019-08-07 RX ORDER — NALOXONE HYDROCHLORIDE 0.4 MG/ML
.1-.4 INJECTION, SOLUTION INTRAMUSCULAR; INTRAVENOUS; SUBCUTANEOUS
Status: DISCONTINUED | OUTPATIENT
Start: 2019-08-07 | End: 2019-08-07 | Stop reason: HOSPADM

## 2019-08-07 RX ORDER — ONDANSETRON 2 MG/ML
4 INJECTION INTRAMUSCULAR; INTRAVENOUS
Status: DISCONTINUED | OUTPATIENT
Start: 2019-08-07 | End: 2019-08-07 | Stop reason: HOSPADM

## 2019-08-07 NOTE — DISCHARGE INSTRUCTIONS
Discharge Instructions after Colonoscopy  or Sigmoidoscopy    Today you had a __x__ Colonoscopy ____ Sigmoidoscopy    Activity and Diet  You were given medicine for pain. You may be dizzy or sleepy.  For 24 hours:    Do not drive or use heavy equipment.    Do not make important decisions.    Do not drink any alcohol.  You may return to your normal diet and medicines.    Discomfort    Air was placed in your colon during the exam in order to see it. Walking helps to pass the air.    You may take Tylenol (acetaminophen) for pain unless your doctor has told you not to.  Do not take aspirin or ibuprofen (Advil, Motrin, or other anti-inflammatory  drugs) for __3___ days.    Follow-up  __x__ We took small tissue samples or polyps to study. Your doctor will call you with the results  within two weeks.    When to call:    Call right away if you have:    Unusual pain in belly or chest pain not relieved with passing air.    More than 1 to 2 Tablespoons of bleeding from your rectum.    Fever above 100.6  F (37.5  C).    If you have severe pain, bleeding, or shortness of breath, go to an emergency room.    If you have questions, call:  Monday to Friday, 7 a.m. to 4:30 p.m.  Endoscopy: [phone] (We may have to call you back)    After hours  Hospital: [phone] (Ask for the GI fellow on call)

## 2019-08-08 ENCOUNTER — OFFICE VISIT (OUTPATIENT)
Dept: ENDOCRINOLOGY | Facility: CLINIC | Age: 64
End: 2019-08-08
Payer: COMMERCIAL

## 2019-08-08 VITALS
DIASTOLIC BLOOD PRESSURE: 77 MMHG | BODY MASS INDEX: 33.32 KG/M2 | HEART RATE: 84 BPM | WEIGHT: 200 LBS | HEIGHT: 65 IN | SYSTOLIC BLOOD PRESSURE: 123 MMHG

## 2019-08-08 DIAGNOSIS — Z79.4 TYPE 2 DIABETES MELLITUS WITH DIABETIC NEPHROPATHY, WITH LONG-TERM CURRENT USE OF INSULIN (H): Primary | ICD-10-CM

## 2019-08-08 DIAGNOSIS — E11.21 TYPE 2 DIABETES MELLITUS WITH DIABETIC NEPHROPATHY, WITH LONG-TERM CURRENT USE OF INSULIN (H): Primary | ICD-10-CM

## 2019-08-08 LAB — COPATH REPORT: NORMAL

## 2019-08-08 ASSESSMENT — PAIN SCALES - GENERAL: PAINLEVEL: NO PAIN (0)

## 2019-08-08 ASSESSMENT — MIFFLIN-ST. JEOR: SCORE: 1619.07

## 2019-08-08 NOTE — PROGRESS NOTES
[facility]  Endocrinology  Marisabel Prieto PA-C  08/08/2019      Chief Complaint:   Diabetes    History of Present Illness:   Lucian Oliveira is a 63 year old male with a history of CAD, type II diabetes mellitus, hypertension, CKD stage 3 and CHANTEL who presents for evaluation of diabetes. He is an immigrant from [place] in 1973. Diagnosed DM2 at age 45, diagnosed by PMD by regular check up. Prescribed oral meds but not much taking and only with diet exercise. Insulin use was started over 1 year ago, Lantus was started with 15 units and now 40 units insulin at night. Used to take Metformin and was discontinued due to GFR. At his visit in 5/2019 his Lantus dose was switched to 40 units in the morning and 8 units in the evening.     Interval history:  His hemoglobin A1c is 7.9% today He states that he recently picked up a new glucometer as his previous one was not working.  He needs nee test strips.  He is checking his sugars in the morning and occasionally in the evening. In the morning his blood sugars are typically 100-190 mg/dL. He states that he had a few elevated numbers 2-3 weeks around 280 that was most likely due to going to a Chinese buffet. However, he typically does not eat much salt or sugar anymore. Further, he feels he has only had 1 low number in the 70's  recently and it was due to a colonoscopy prep. When his blood sugar is low he often feels tired, however does not have these often and it is never below 70. Overall, his blood sugar is typically /160. He will typically eat dinner around 5:30 - 6pm and go to bed around 8-9 pm.     For his medication, him and his wife work hard to ensure he does not forget to take his medication. His regiment is currently Amaryl 4 mg in the morning, Lantus 40 units in the morning and 8 unites in the evening, and Januvia 50 mg. He states that he rarely misses any doses.      Blood Glucose Monitoring:  We reviewed glucometer data together.  Average: 149  mg/dL  Highest: 209 mg/dL  Lowest: 102 mg/dL    Diabetes monitoring and complications:  CAD: Yes  Last eye exam results: 09/18/2018  Last dental exam: not discussed today.  Microalbuminuria: 3.74 October 2018  HTN: Yes  On Statin: Yes  On Aspirin: Yes  Depression: No    Review of Systems:   Pertinent items are noted in HPI.  All other systems are negative.    Active Medications:      aspirin 81 MG tablet, Take 1 tablet (81 mg) by mouth daily, Disp: 30 tablet, Rfl: 11     atorvastatin (LIPITOR) 40 MG tablet, Take 1 tablet (40 mg) by mouth daily, Disp: 90 tablet, Rfl: 3     bisacodyl (DULCOLAX) 5 MG EC tablet, Take as directed by Endoscopy clinic, Disp: 4 tablet, Rfl: 0     carvedilol (COREG) 25 MG tablet, Take 0.5 tablets (12.5 mg) by mouth 2 times daily (with meals), Disp: 180 tablet, Rfl: 1     clopidogrel (PLAVIX) 75 MG tablet, Take 1 tablet (75 mg) by mouth daily, Disp: 90 tablet, Rfl: 3     furosemide (LASIX) 20 MG tablet, Take 1 tablet (20 mg) by mouth daily, Disp: 90 tablet, Rfl: 1     glimepiride (AMARYL) 4 MG tablet, Take 1 tablet (4 mg) by mouth every morning (before breakfast), Disp: 180 tablet, Rfl: 3     hydrALAZINE (APRESOLINE) 25 MG tablet, Take 1 tablet (25 mg) by mouth 3 times daily, Disp: 270 tablet, Rfl: 1     insulin glargine (LANTUS SOLOSTAR) 100 UNIT/ML pen, Take 8 units in the morning and 40 units in the evening, Disp: 45 mL, Rfl: 3     isosorbide mononitrate (IMDUR) 60 MG 24 hr tablet, Take 1 tablet (60 mg) by mouth daily, Disp: 90 tablet, Rfl: 1     losartan (COZAAR) 50 MG tablet, Take 1 tablet (50 mg) by mouth daily, Disp: 90 tablet, Rfl: 3     neomycin-polymyxin-dexamethasone (MAXITROL) 3.5-09174-9.1 ophthalmic ointment, Place 0.5 inches into both eyes At Bedtime, Disp: 1 Tube, Rfl: 3     nitroglycerin (NITROSTAT) 0.4 MG SL tablet, Place 1 tablet (0.4 mg) under the tongue every 5 minutes as needed for chest pain If you are still having symptoms after 3 doses (15 minutes) call 911., Disp: 10  "tablet, Rfl: 0     sitagliptin (JANUVIA) 50 MG tablet, Take 1 tablet (50 mg) by mouth daily, Disp: 90 tablet, Rfl: 5     bevacizumab (AVASTIN) intravitreal inj 1.25 mg, 1.25 mg, Intravitreal, Q28 Days, Triny Bunch MD, 1.25 mg at 08/01/19 1455     bevacizumab (AVASTIN) intravitreal inj 1.25 mg, 1.25 mg, Intravitreal, Q28 Days, Triny Bunch MD, 1.25 mg at 08/01/19 1455     erythromycin (ROMYCIN) ophthalmic ointment, , Left Eye, TID, Triny Bunch MD     lidocaine (PF) (XYLOCAINE) 2 % injection 10 mL, 10 mL, Other, Once, Triny Bunch MD     sodium chloride (PF) 0.9% PF flush 10 mL, 10 mL, Intracatheter, Once, Omid Boateng      Allergies:   No known drug allergies      Past Medical History:  Coronary artery disease  Congestive heart failure  Chronic kidney disease  Cortical cataract both eyes  Diabetes  Hyperlipidemia  Hypertension  Ischemic cardiomyopathy  Obesity  Obstructive sleep apnea  Osteoarthritis   Systolic heart failure  Proteinuria  Vitamin D deficiency  Chondromalacia of patella  Tinnitus  Ptosis of right eyelid  non proliferative diabetic retinopathy  Secondary renal hyperparathyroidism   Heart transplant candidate     Past Surgical History:  Artery vein three  Right heart cath  Implant automatic cardioverter defibrillator     Family History:   Diabetes - brother, sister, sister       Social History:   This patient was accompanied by: mother  Smoking status: never  Smokeless tobacco: never  Alcohol use: no  Drug use: no  He still would like to go back to work, but he understands cardiology is recommending a procedure prior.       Physical Exam:   /77   Pulse 84   Ht 1.651 m (5' 5\")   Wt 90.7 kg (200 lb)   BMI 33.28 kg/m       Wt Readings from Last 10 Encounters:   08/08/19 90.7 kg (200 lb)   07/29/19 93.3 kg (205 lb 11.2 oz)   07/17/19 90.9 kg (200 lb 4.8 oz)   07/09/19 90.3 kg (199 lb)   05/17/19 93.4 kg (206 lb)   05/17/19 91.6 kg (202 lb) " "  05/07/19 93.5 kg (206 lb 3.2 oz)   05/07/19 93.5 kg (206 lb 3.2 oz)   03/25/19 88.9 kg (196 lb)   03/25/19 89.4 kg (197 lb)      General: Pleasant, well nourished and hydrated male in NAD.   Psych:  Mood is \"good,\" affect is appropriate.  Thought form and content are fluid and coherent.    HEENT: Eyes and sclera are clear. Extraocular movements are grossly intact without proptosis.  Nares are patent, mucous membranes moist.  Neck: No masses or JVD are noted.    Resp: Easy and unlabored breathing.   Neuro: Alert and oriented, communicating clearly.   Ext: no swelling or edema     Data:  Lab Results   Component Value Date     07/08/2019    POTASSIUM 4.7 07/08/2019    CHLORIDE 105 07/08/2019    CO2 30 07/08/2019    ANIONGAP 3 07/08/2019     (H) 07/08/2019    BUN 30 07/08/2019    CR 2.08 (H) 07/08/2019    BRYANNA 8.8 07/08/2019     Lab Results   Component Value Date    GFRESTIMATED 32 (L) 07/08/2019    GFRESTIMATED 38 (L) 05/17/2019    GFRESTIMATED 32 (L) 05/17/2019    GFRESTBLACK 37 (L) 07/08/2019    GFRESTBLACK 44 (L) 05/17/2019    GFRESTBLACK 39 (L) 05/17/2019      Lab Results   Component Value Date    MICROL 6 10/03/2018    UMALCR 3.74 10/03/2018        Lab Results   Component Value Date    A1C 7.9 (H) 07/08/2019    A1C 8.5 (H) 03/11/2019    A1C 7.6 (H) 10/16/2018    A1C 9.8 (H) 09/06/2017    A1C 9.1 (H) 07/05/2017    HEMOGLOBINA1 8.3 (A) 08/06/2018    HEMOGLOBINA1 10.6 (A) 04/30/2018     No results found for: CPEPT, GADAB, ISCAB    Lab Results   Component Value Date    CHOL 104 07/08/2019    CHOL 91 08/16/2018    TRIG 181 (H) 07/08/2019    TRIG 297 (H) 08/16/2018    HDL 27 (L) 07/08/2019    HDL 25 (L) 08/16/2018    LDL 41 07/08/2019    LDL 7 08/16/2018    NHDL 77 07/08/2019    NHDL 66 08/16/2018     Component      Latest Ref Rng & Units 7/8/2019   Hemoglobin A1C      0 - 5.6 % 7.9 (H)     Hemoglobin   Date Value Ref Range Status   07/08/2019 13.4 13.3 - 17.7 g/dL Final   ]    Assessment and Plan:  Lucian " is a type 2 diabetic with chronic kidney disease. His blood sugars are at goal. Encouraged him to check his blood sugar at bedtime to see if he would benefit from once daily Novolog to avoid blood sugars over 200. No change in medications at this time, will consider if frequent BG >200 or A1C >8.0.    Type 2 diabetes mellitus with diabetic nephropathy, with long-term current use of insulin (H)  - blood glucose (NO BRAND SPECIFIED) test strip  Dispense: 200 strip; Refill: 3  - insulin pen needle (B-D U/F) 31G X 5 MM miscellaneous  Dispense: 100 each; Refill: 0        Follow-up: Return in about 3 months (around 11/8/2019).     >50% of 30 minute visit spent in face to face counseling, education and coordination of care related to options for better glycemic control as well as preventing, detecting, and treating hypoglycemia.      It is my privilege to be involved in the care of the above patient.     Marisabel Prieto PA-C, [facility]  [facility]  Diabetes, Endocrinology, and Metabolism  [phone] Appointments/Nurse  [phone] Fax  [phone] pager  [phone] nurse line               Scribe Disclosure:  I, Jyoti Ji, am serving as a scribe to document services personally performed by Marisabel Prieto PA-C at this visit, based upon the provider's statements to me. All documentation has been reviewed by the aforementioned provider prior to being entered into the official medical record.     Portions of this medical record were completed by a scribe. UPON MY REVIEW AND AUTHENTICATION BY ELECTRONIC SIGNATURE, this confirms (a) I performed the applicable clinical services, and (b) the record is accurate.

## 2019-08-08 NOTE — PATIENT INSTRUCTIONS
Felicidades esta haciendo savannah lainez plan de diabetes.    Sigue halie esta kiley por favor cheqeua el azucar por lo menos ds veces al najma para caesar cuanto sube de noche.    Llame si es que tiene azucar sea <70 or >300.    My best wishes,    Marisabel Prieto PA-C, MPAS  [facility]  Diabetes, Endocrinology, and Metabolism  [phone] Appointments/Nurse  [phone] Fax  [phone] nurse line  [phone] URGENTafter hours/weekend Endocrinologist on call

## 2019-08-08 NOTE — LETTER
8/8/2019       RE: Lucian Henderson Sr.  [address] [facility] [address]     Dear Colleague,    Thank you for referring your patient, Lucian Henderson Sr., to the [facility] ENDOCRINOLOGY at [facility]. Please see a copy of my visit note below.    [facility]  Endocrinology  Marisabel Prieto PA-C  08/08/2019      Chief Complaint:   Diabetes    History of Present Illness:   Lucian Oliveira is a 63 year old male with a history of CAD, type II diabetes mellitus, hypertension, CKD stage 3 and CHANTEL who presents for evaluation of diabetes. He is an immigrant from [place] in 1973. Diagnosed DM2 at age 45, diagnosed by PMD by regular check up. Prescribed oral meds but not much taking and only with diet exercise. Insulin use was started over 1 year ago, Lantus was started with 15 units and now 40 units insulin at night. Used to take Metformin and was discontinued due to GFR. At his visit in 5/2019 his Lantus dose was switched to 40 units in the morning and 8 units in the evening.     Interval history:  His hemoglobin A1c is 7.9% today He states that he recently picked up a new glucometer as his previous one was not working.  He needs nee test strips.  He is checking his sugars in the morning and occasionally in the evening. In the morning his blood sugars are typically 100-190 mg/dL. He states that he had a few elevated numbers 2-3 weeks around 280 that was most likely due to going to a Chinese buffet. However, he typically does not eat much salt or sugar anymore. Further, he feels he has only had 1 low number in the 70's  recently and it was due to a colonoscopy prep. When his blood sugar is low he often feels tired, however does not have these often and it is never below 70. Overall, his blood sugar is typically /160. He will typically eat dinner around 5:30 - 6pm and go to bed around 8-9 pm.     For his medication, him and his wife work hard to ensure he does  not forget to take his medication. His regiment is currently Amaryl 4 mg in the morning, Lantus 40 units in the morning and 8 unites in the evening, and Januvia 50 mg. He states that he rarely misses any doses.      Blood Glucose Monitoring:  We reviewed glucometer data together.  Average: 149 mg/dL  Highest: 209 mg/dL  Lowest: 102 mg/dL    Diabetes monitoring and complications:  CAD: Yes  Last eye exam results: 09/18/2018  Last dental exam: not discussed today.  Microalbuminuria: 3.74 October 2018  HTN: Yes  On Statin: Yes  On Aspirin: Yes  Depression: No    Review of Systems:   Pertinent items are noted in HPI.  All other systems are negative.    Active Medications:      aspirin 81 MG tablet, Take 1 tablet (81 mg) by mouth daily, Disp: 30 tablet, Rfl: 11     atorvastatin (LIPITOR) 40 MG tablet, Take 1 tablet (40 mg) by mouth daily, Disp: 90 tablet, Rfl: 3     bisacodyl (DULCOLAX) 5 MG EC tablet, Take as directed by Endoscopy clinic, Disp: 4 tablet, Rfl: 0     carvedilol (COREG) 25 MG tablet, Take 0.5 tablets (12.5 mg) by mouth 2 times daily (with meals), Disp: 180 tablet, Rfl: 1     clopidogrel (PLAVIX) 75 MG tablet, Take 1 tablet (75 mg) by mouth daily, Disp: 90 tablet, Rfl: 3     furosemide (LASIX) 20 MG tablet, Take 1 tablet (20 mg) by mouth daily, Disp: 90 tablet, Rfl: 1     glimepiride (AMARYL) 4 MG tablet, Take 1 tablet (4 mg) by mouth every morning (before breakfast), Disp: 180 tablet, Rfl: 3     hydrALAZINE (APRESOLINE) 25 MG tablet, Take 1 tablet (25 mg) by mouth 3 times daily, Disp: 270 tablet, Rfl: 1     insulin glargine (LANTUS SOLOSTAR) 100 UNIT/ML pen, Take 8 units in the morning and 40 units in the evening, Disp: 45 mL, Rfl: 3     isosorbide mononitrate (IMDUR) 60 MG 24 hr tablet, Take 1 tablet (60 mg) by mouth daily, Disp: 90 tablet, Rfl: 1     losartan (COZAAR) 50 MG tablet, Take 1 tablet (50 mg) by mouth daily, Disp: 90 tablet, Rfl: 3     neomycin-polymyxin-dexamethasone (MAXITROL) 3.5-88805-5.1  ophthalmic ointment, Place 0.5 inches into both eyes At Bedtime, Disp: 1 Tube, Rfl: 3     nitroglycerin (NITROSTAT) 0.4 MG SL tablet, Place 1 tablet (0.4 mg) under the tongue every 5 minutes as needed for chest pain If you are still having symptoms after 3 doses (15 minutes) call 911., Disp: 10 tablet, Rfl: 0     sitagliptin (JANUVIA) 50 MG tablet, Take 1 tablet (50 mg) by mouth daily, Disp: 90 tablet, Rfl: 5     bevacizumab (AVASTIN) intravitreal inj 1.25 mg, 1.25 mg, Intravitreal, Q28 Days, Triny Bunch MD, 1.25 mg at 08/01/19 1455     bevacizumab (AVASTIN) intravitreal inj 1.25 mg, 1.25 mg, Intravitreal, Q28 Days, Triny Bunch MD, 1.25 mg at 08/01/19 1455     erythromycin (ROMYCIN) ophthalmic ointment, , Left Eye, TID, Triny Bunch MD     lidocaine (PF) (XYLOCAINE) 2 % injection 10 mL, 10 mL, Other, Once, Triny Bunch MD     sodium chloride (PF) 0.9% PF flush 10 mL, 10 mL, Intracatheter, Once, Omid Boateng      Allergies:   No known drug allergies      Past Medical History:  Coronary artery disease  Congestive heart failure  Chronic kidney disease  Cortical cataract both eyes  Diabetes  Hyperlipidemia  Hypertension  Ischemic cardiomyopathy  Obesity  Obstructive sleep apnea  Osteoarthritis   Systolic heart failure  Proteinuria  Vitamin D deficiency  Chondromalacia of patella  Tinnitus  Ptosis of right eyelid  non proliferative diabetic retinopathy  Secondary renal hyperparathyroidism   Heart transplant candidate     Past Surgical History:  Artery vein three  Right heart cath  Implant automatic cardioverter defibrillator     Family History:   Diabetes - brother, sister, sister       Social History:   This patient was accompanied by: mother  Smoking status: never  Smokeless tobacco: never  Alcohol use: no  Drug use: no  He still would like to go back to work, but he understands cardiology is recommending a procedure prior.       Physical Exam:   /77    "Pulse 84   Ht 1.651 m (5' 5\")   Wt 90.7 kg (200 lb)   BMI 33.28 kg/m        Wt Readings from Last 10 Encounters:   08/08/19 90.7 kg (200 lb)   07/29/19 93.3 kg (205 lb 11.2 oz)   07/17/19 90.9 kg (200 lb 4.8 oz)   07/09/19 90.3 kg (199 lb)   05/17/19 93.4 kg (206 lb)   05/17/19 91.6 kg (202 lb)   05/07/19 93.5 kg (206 lb 3.2 oz)   05/07/19 93.5 kg (206 lb 3.2 oz)   03/25/19 88.9 kg (196 lb)   03/25/19 89.4 kg (197 lb)      General: Pleasant, well nourished and hydrated male in NAD.   Psych:  Mood is \"good,\" affect is appropriate.  Thought form and content are fluid and coherent.    HEENT: Eyes and sclera are clear. Extraocular movements are grossly intact without proptosis.  Nares are patent, mucous membranes moist.  Neck: No masses or JVD are noted.    Resp: Easy and unlabored breathing.   Neuro: Alert and oriented, communicating clearly.   Ext: no swelling or edema     Data:  Lab Results   Component Value Date     07/08/2019    POTASSIUM 4.7 07/08/2019    CHLORIDE 105 07/08/2019    CO2 30 07/08/2019    ANIONGAP 3 07/08/2019     (H) 07/08/2019    BUN 30 07/08/2019    CR 2.08 (H) 07/08/2019    BRYANNA 8.8 07/08/2019     Lab Results   Component Value Date    GFRESTIMATED 32 (L) 07/08/2019    GFRESTIMATED 38 (L) 05/17/2019    GFRESTIMATED 32 (L) 05/17/2019    GFRESTBLACK 37 (L) 07/08/2019    GFRESTBLACK 44 (L) 05/17/2019    GFRESTBLACK 39 (L) 05/17/2019      Lab Results   Component Value Date    MICROL 6 10/03/2018    UMALCR 3.74 10/03/2018        Lab Results   Component Value Date    A1C 7.9 (H) 07/08/2019    A1C 8.5 (H) 03/11/2019    A1C 7.6 (H) 10/16/2018    A1C 9.8 (H) 09/06/2017    A1C 9.1 (H) 07/05/2017    HEMOGLOBINA1 8.3 (A) 08/06/2018    HEMOGLOBINA1 10.6 (A) 04/30/2018     No results found for: CPEPT, GADAB, ISCAB    Lab Results   Component Value Date    CHOL 104 07/08/2019    CHOL 91 08/16/2018    TRIG 181 (H) 07/08/2019    TRIG 297 (H) 08/16/2018    HDL 27 (L) 07/08/2019    HDL 25 (L) " 08/16/2018    LDL 41 07/08/2019    LDL 7 08/16/2018    NHDL 77 07/08/2019    NHDL 66 08/16/2018     Component      Latest Ref Rng & Units 7/8/2019   Hemoglobin A1C      0 - 5.6 % 7.9 (H)     Hemoglobin   Date Value Ref Range Status   07/08/2019 13.4 13.3 - 17.7 g/dL Final   ]    Assessment and Plan:  Lucian is a type 2 diabetic with chronic kidney disease. His blood sugars are at goal. Encouraged him to check his blood sugar at bedtime to see if he would benefit from once daily Novolog to avoid blood sugars over 200. No change in medications at this time, will consider if frequent BG >200 or A1C >8.0.    Type 2 diabetes mellitus with diabetic nephropathy, with long-term current use of insulin (H)  - blood glucose (NO BRAND SPECIFIED) test strip  Dispense: 200 strip; Refill: 3  - insulin pen needle (B-D U/F) 31G X 5 MM miscellaneous  Dispense: 100 each; Refill: 0        Follow-up: Return in about 3 months (around 11/8/2019).     >50% of 30 minute visit spent in face to face counseling, education and coordination of care related to options for better glycemic control as well as preventing, detecting, and treating hypoglycemia.      It is my privilege to be involved in the care of the above patient.     Marisabel Prieto PA-C, [facility]  [facility]  Diabetes, Endocrinology, and Metabolism  [phone] Appointments/Nurse  [phone] Fax  [phone] pager  [phone] nurse line    Scribe Disclosure:  I, Jyoti Ji, am serving as a scribe to document services personally performed by Marisabel Prieto PA-C at this visit, based upon the provider's statements to me. All documentation has been reviewed by the aforementioned provider prior to being entered into the official medical record.     Portions of this medical record were completed by a scribe. UPON MY REVIEW AND AUTHENTICATION BY ELECTRONIC SIGNATURE, this confirms (a) I performed the applicable clinical services, and (b) the record is accurate.

## 2019-08-09 ENCOUNTER — TELEPHONE (OUTPATIENT)
Dept: TRANSPLANT | Facility: CLINIC | Age: 64
End: 2019-08-09

## 2019-08-09 ENCOUNTER — OFFICE VISIT (OUTPATIENT)
Dept: NEUROPSYCHOLOGY | Facility: CLINIC | Age: 64
End: 2019-08-09
Payer: COMMERCIAL

## 2019-08-09 DIAGNOSIS — R41.3 MEMORY DYSFUNCTION: ICD-10-CM

## 2019-08-09 DIAGNOSIS — F54 PSYCHOLOGICAL AND BEHAVIORAL FACTORS ASSOCIATED WITH DISORDERS OR DISEASES CLASSIFIED ELSEWHERE: Primary | ICD-10-CM

## 2019-08-09 DIAGNOSIS — I50.22 CHRONIC SYSTOLIC CONGESTIVE HEART FAILURE (H): ICD-10-CM

## 2019-08-09 NOTE — TELEPHONE ENCOUNTER
Called pt with assistance of . Pt requested I check in with him after our visit last month. Pt's weight has been stable ~200 lbs, which is good that he has not gained weight. BMI ~33 and within criteria for heart transplant. Pt does report he has been eating less tortillas and bread, despite no changes in weight. Pt reports he has no questions or concerns to discuss at this time.     Pt has phone number to call back if needed

## 2019-08-19 ENCOUNTER — CARE COORDINATION (OUTPATIENT)
Dept: TRANSPLANT | Facility: CLINIC | Age: 64
End: 2019-08-19

## 2019-08-19 NOTE — PROGRESS NOTES
"Advanced Heart Failure Presentation  Date of Presentation:  //  Presenting MD:  Dr. Arvin Henderson Sr.  Gender: male  : 1955  63 year old    Presenting For: Transplant/VAD     ABO    O    Height/Weight/BMI  Wt Readings from Last 2 Encounters:   19 90.7 kg (200 lb)   19 93.3 kg (205 lb 11.2 oz)     Ht Readings from Last 2 Encounters:   19 1.651 m (5' 5\")   19 1.651 m (5' 5\")      Estimated body mass index is 33.28 kg/m  as calculated from the following:    Height as of 19: 1.651 m (5' 5\").    Weight as of 19: 90.7 kg (200 lb).    Immunology  PRA:     Date:  19  Result:  0%      Evaluation Summary:  Results of the evaluation have been reviewed and have been documented in the patient's medical record.     HPI:  We had the pleasure of seeing Mr. Lucian Oliveira in our Advanced Heart Failure Clinic.  As you know, he is a 63-year-old male with ischemic cardiomyopathy and severe LV systolic dysfunction, who is currently on optimal medical therapy.      PAST MEDICAL HISTORY:   1.  Coronary artery disease, status post coronary artery bypass grafting surgery in .   2.  Ischemic cardiomyopathy with an estimated ejection fraction of 25%.   3.  Hypertension.   4.  Hyperlipidemia.   5.  Diabetes.   6.  Obesity.   7.  Obstructive sleep apnea.   8.  Chronic kidney disease.          1.  CPX  Date:  18  MVO2: 10.2 (35)  RER:  1.21  VE/VC02 slope:  37.22  BP range:  109/62--->84/52  HR range:  77---> 119 Duration:  7:31(min:sec)  Terminated due to:  Leg fatigue          2.  RHC  Date:  7-10-19  -10-19:  RA: 8  PA: 60/31, 40  PAW: 27  Jacy CO: 4.28 (2.17)  PVR: 3.03 w.u.                          BP: --  HR:--  PA Sat: 60%   Hemoglobin: --  ( \" \" \" )     RA: --  PA: 32/15, 21  PAW: 14  Jacy CO: 5.1 (2.6)  PVR: 1.37w.u. On Nipride 1.5 mcg.                          BP: --  HR:--  PA Sat: 70%   Hemoglobin: --    3.  Echo  Date:  19:  Mod-severe LVE, EF " 10-20%.  No significant valve dysfunction.  No change in size or function compared to 6-5-17 echo.  LVE 6.5 / 4.9 cm.          4.  Social Work-review Epic notes and report from     5.  Neuropsych-review Epic notes and report from     6.  Abnormals/Incomplete Testing:  Incomplete:  Dental - Pt has complex dental needs that require multiple therapies including but not limited to extractions, periodontal scaling and root planing, restorative work, and prophylactic f/u.       Abnormal:  CPEST - drop in BP with max exercise    Head CT - unchanged encephalomalacia in the left frontal lobe.

## 2019-08-19 NOTE — PROGRESS NOTES
NAME: Lucian Morillo Sr.  MRN: [id number]  : 1955  ALBRIGHT: 2019  Neuropsychology Laboratory  [address]  [id number] [phone]    NEUROPSYCHOLOGICAL EVALUATION    RELEVANT HISTORY AND REASON FOR REFERRAL    This is a report of neuropsychological consultation regarding Lucian Henderson, 63-year-old [place] immigrant with the equivalent of 12 years of formal education. He is currently under consideration for treatment of chronic systolic heart failure with LVAD or transplant. He is referred for the requisite neuropsychological assessment of brain function in that context by Dr. Arvin Sheridan.     Among other issues, his current medical concerns include s/p CABG and s/p ICD placement, type-2 diabetes mellitus with moderate nonproliferative diabetic retinopathy, stage-3 chronic kidney disease, secondary renal hyperparathyroidism, hypertension, hyperlipidemia, coronary artery disease, obesity, and obstructive sleep apnea. Current medications include 81 mg aspirin, atorvastatin, bisacodyl, carvedilol, clopidogrel, furosemide, glimepiride, hydralazine, insulin glargine, isosorbide mononitrate, losartan, neomycin-polymyxin-dexamethasone ophthalmic ointment, nitroglycerin, polyethylene glycol, sitagliptin, bevacizumab, and erythromycin ophthalmic ointment.     Mr. Tee Henderson moved here from [place] in . He is accompanied to this evaluation by his wife. He is able to converse in some English, but the evaluation is performed with the services of an .    In interview, he demonstrates a grossly appropriate understanding of his cardiac concerns in the therapies laid out before him, but he needs follow-up questions to add key details and prompting to be specific. He tells me he is unsure of whether or not he wants to go through with VAD, as opposed to just waiting for transplant. He describes a general goal of just wanting to  feel better. He may wish to return to work at some point. He is unable to enumerate any of the specific risks that he might be facing with either VAD or transplant. He suspects that risks are similar to what he faced for his CABG.    He counts his wife as his core support group. She says she will support whatever decision he makes regarding treatment options. She is reportedly able to stay at home and help with 24/7 postsurgical care for 30 days. He has four children. Two of them live in [place], and the other two are in [place] and [place].    He denies any current problems with mental health. He denies any history of mental health concerns. He denies any trauma/abuse history. He says he has never taken psychiatric medications or seen a counselor/therapist. He denies any suicidality. He denies any hallucinatory experiences. He says he sleeps well and wakes up rested in the mornings. He occasionally takes naps while watching TV in the afternoons, but he denies any unintentional sleeping. He says his energy levels are sufficient for his daily needs. His levels of interest and motivation to participate in in enjoyable activities (e.g., fixing cars, going to the gym) are reportedly normal; he says his engagement in these activities is only limited by physical issues.    He denies any history of alcohol problems. He reports that he might drink one or two beers occasionally, but he says it has been  a long time  since he has had any alcohol. He says he has never used tobacco. He denies any illicit drug use. He denies any legal issues. He denies any gambling issues.    He denies any concerns about his cognitive functioning. He says he manages the household finances independently and without difficulty. Per communication from members of the treatment team, there have been issues with some of his medications not getting filled in the manner they were prescribed, such as carvedilol, clopidogrel, and furosemide. He says his  wife has been helping him track all of his medications for a long time. He says that he gets frustrated because he has so many medications take, so she helps to keep him on track with reminders and encouragement. Today, his wife says that she does not provide medication tracking assistance because of any cognitive concerns. He does not limit his driving in any way. His ability to participate in general household activities is reportedly not affected by cognition.    He denies any history of academic delays or remedial education needs. He says he completed a high school education. 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    He reports that his diabetes control is  so-so,  with A1c readings in the mid-7s. A1c was 7.9 last month, and readings have ranged from 7.6-10.5 over the last two years.    He describes some past concerns about tremor, which were possibly related to blood sugar management. He denies any unilateral weakness or numbness. He denies any balance or coordination difficulties. He denies any history of seizure. He denies any migraine history. He says he has a scar on his scalp from a head injury around age 10, but there was no loss of consciousness associated with that event. He denies any problems with his senses of smell, taste, or hearing. He reports a bilateral eye surgery aimed at correcting  little rays  in his vision, though they are still there.    He denies any history of stroke. He presented to the emergency room in May of this year with atypical vision loss, and stroke code was called. Per neurological evaluation, he was seen as neurologically intact without focal deficits. Head CT showed encephalomalacia of the inferolateral left frontal lobe but no acute abnormalities. Head CT obtained earlier this month was seen as stable compared to the scans in May.    BEHAVIORAL OBSERVATIONS AND MEASURES ADMINISTERED    Merly Tee  Santiago was polite and passively cooperative with the evaluation. He seemed to be minimizing concerns in response to most questions. I have a modest comprehension of [place]. To my years, his speech appeared to be fluent, prosodic, and normally articulated. Some word finding lapses or reduce levels of general information may have been present, as the  needed to ask for clarifications at times and seemed to be stumped by points of vagueness in some of his responses. Brief cognitive screening with the [place] version of the MoCA was performed with the aid of the .    RESULTS AND INTERPRETATION    Rom Cognitive Assessment (MOCA)  Trail-makin/1  Cube copy: 0/1  Clock: 33  Naming: 3/3  Word List Repetition: (unscored, 2/5, 3/5)   Attention - Digits: 0/2  Attention - Letters: 1/  Attention - Subtraction: 1/3  Language - Repeat: 0/2  Language - Fluency: 0/1  Abstraction: 0/2  Word List Recall: 3/5  Orientation: 2  Education: +1  MOCA Total Score: 14/30    IMPRESSIONS AND RECOMMENDATIONS    By his report, there are no concerns about mental health issues or substance use that would interfere with LVAD/transplant candidacy.    Brief cognitive screening with the [place] version of the MoCA was performed with the aid of an  who does not regularly assist with cognitive evaluations (i.e., under nonstandard conditions). Thus, the test results may not perfectly reflect his true cognitive capacity and may have been unduly influenced by situational factors. However, it was clear that some degree of his performance abnormality was due to veritable cognitive difficulties. He appears to have at least a mild level of cognitive impairment, affecting visual spatial/executive abilities, attentional control, working memory, verbal fluency, mental abstraction, recent memory, orientation to place, and orientation to time. These findings are plausibly related (in part) to the left frontal  encephalomalacia seen on recent head CTs, as well as the effects of his chronic cardiovascular and metabolic risk factors.     In interview, his responses seem to be somewhat vague, but with extra questioning he is able to demonstrate a reasonable comprehension of his medical situation and the concerns leading to consideration of VAD/transplant. However, he is not able to demonstrate an appropriate understanding of the specific risks he might face with VAD or transplant.     I have some concerns about cognitive problems interfering with compliance with medical care. He also seems to be an individual who generally minimizes any concerns. I get a sense that his wife is not willing to contradict him when he says he has no cognitive concerns; she appears rather nervous when I direct questions to her instead of her . It might be helpful for the treatment team to speak with her separately at times, or with one of his children. I am glad that she is providing oversight of medication management. He is probably going to need significantly more assistance, simplifications, and repetition of instructions in order to learn the functions and maintenance of LVAD. He is also probably going to need long-term assistance with tracking his medications, appointments, and daily maintenance needs.    My ability to provide a thorough evaluation of Mr. Tee Henderson s neuropsychological status is limited by cultural/linguistic factors. If a broader evaluation is deemed necessary by the treatment team, I would recommend trying to have him see Dr. Nancy Gaffney, who practices at the [facility]. She is fluent in [place] and capable of obtaining more in-depth psychometric data using culturally appropriate measures and normative databases.    Chauncey Livingston, PhD, LP, ABPP-CN  Board Certified in Clinical Neuropsychology  Licensed Psychologist [id number]     Department of Rehabilitation  Medicine  Division of Adult Neuropsychology  [facility]      Time spent: One hour neurobehavioral status exam including interview, clinical assessment by licensed and board-certified neuropsychologist (CPT 69458). One hour neuropsychological testing evaluation by licensed and board-certified neuropsychologist, including integration of patient data, interpretation of standardized test results and clinical data, clinical decision-making, treatment planning, report, and interactive feedback to the patient, first hour (CPT 22085). One hours of neuropsychological testing evaluation by licensed and board-certified neuropsychologist, including integration of patient data, interpretation of standardized test results and clinical data, clinical decision-making, treatment planning, report, and interactive feedback to the patient, subsequent hours (CPT 78126). Diagnoses: F54, I50.22, R41.3

## 2019-08-23 ENCOUNTER — TELEPHONE (OUTPATIENT)
Dept: TRANSPLANT | Facility: CLINIC | Age: 64
End: 2019-08-23

## 2019-08-23 DIAGNOSIS — I50.20 SYSTOLIC HEART FAILURE (H): Primary | ICD-10-CM

## 2019-08-23 DIAGNOSIS — I50.22 CHRONIC SYSTOLIC HEART FAILURE (H): Primary | Chronic | ICD-10-CM

## 2019-08-23 ASSESSMENT — NEW YORK HEART ASSOCIATION (NYHA) CLASSIFICATION: NYHA FUNCTIONAL CLASS: IV

## 2019-08-23 ASSESSMENT — PULMONARY FUNCTION TESTS: FEV1 (%PREDICTED): 2

## 2019-08-23 NOTE — TELEPHONE ENCOUNTER
"Mr Oliveira is finishing up his heart transplant evaluation, and his case was presented at today's Team Meeting.  Recommendations included a trial of inotropes to see if it improves his renal function, and possible VAD in the future if his condition worsens.  Given the language barrier, and the pt's previous hesitation about treatment options, we will meet with him and his family in clinic to review his options, rather than doing it over the phone.  With assistance from a , I discussed this appt with Lucian and his wife.     I also discussed his dental clearance with them.  The pt had concerns about whether his local dentist would agree to see him given his heart condition, so he requested that he be referred to the [facility], and saw them several weeks ago.   Extensive treatments were needed, and the pt was instructed to call back to make appts, but as of yet has not done so.   When I asked Lucian about this today, he claims that he didn't know he needed more work done.  Later, he said he didn't like the Dental Clinic, stating that it was \"too far away,\" and that he didn't like the program.   He was told that he could have his dental work done anywhere, but that it needed to be completed before we can move forward with heart failure treatment options like VAD or transplant.   Lucian would like to go back to his local dentist if they will agree to take him.  He was encouraged to call for an appt., and make arrangements for his dental records, films, etc to be forwarded.      Plan:  Clinic consult with Dr Sheridan on 8/26 with labs prior.  "

## 2019-08-25 NOTE — PROGRESS NOTES
Service Date: 07/29/2019      I was requested to see Mr. Tee Henderson for evaluation of options for end-stage heart failure.      HISTORY OF PRESENT ILLNESS:  The patient is a pleasant 63-year-old gentleman with a past medical history of coronary artery disease, status post coronary bypass grafting with worsening shortness of breath, fatigue and tiredness over the last several months.  His effort tolerance is limited to less than 1 to 1-1/2 blocks.  He complains of some lower extremity swelling and weight gain.  Denied any fever, chills, syncope, palpitation, loss of consciousness.      MEDICATIONS:  Aspirin, Lipitor, Plavix, Lasix, Amaryl, prednisone, nitroglycerin and Januvia.      SOCIAL HISTORY:  Denies current alcohol, drug or tobacco abuse.      REVIEW OF SYSTEMS:  A 10-point review of systems normal other than mentioned in history and physical.      FAMILY HISTORY:  Reviewed.      ALLERGIES:  Reviewed.      PHYSICAL EXAMINATION:   VITAL SIGNS:  He is afebrile, blood pressure 100/74, heart rate 68.   GENERAL:  He is awake, alert, oriented, comfortable at rest.   CARDIOVASCULAR SYSTEM:  S1, S2 normal, no S3.  No murmurs.   RESPIRATORY:  Bibasilar rhonchi or crepitations.   ABDOMEN:  Bowel sounds nontender.   LOWER EXTREMITIES:  Warm.  No pedal edema.   NEUROLOGIC:  No focal deficits.   EYES:  normal  DERMATOLOGICAL:  Within normal limits.      LABORATORY DATA:  Electrolytes within normal limits, creatinine 1.84, white cell count within normal limits.  I reviewed his echocardiogram performed in 03/2019 that showed left ventricular wall thickness normal.  Moderate to severe left ventricular dysfunction, ejection fraction 10%-20%.  No significant valvular abnormalities.  Left ventricular end-diastolic is 6.5 cm.  His cardiopulmonary stress test shows a peak VO2 of 10.2 mL/kg/minute.  His right heart cath shows moderately reduced cardiac index with normal biventricular filling pressures.      ASSESSMENT AND PLAN:   A 63-year-old gentleman with past medical history of hypertension, hyperlipidemia, diabetes, coronary artery disease, status post coronary artery bypass grafting, who is being evaluated for end-stage heart failure options.  I did discuss with him the risks and benefits of both heart transplant and LVAD including risk of death, stroke, bleeding, wound infection, renal failure.  He understands and is willing to consider this.  He is currently undergoing his complete workup.  He will also need a CT scan of his chest as he has had previous cardiac surgery.  If he also does an elective VAD, he will need Plavix to be stopped.  I have discussed plan with Dr. Sheridan.  If there are any questions, give contact me.      cc:   Arvin Sheridan MD   [facility] [address]      [facility] Billing   [facility] [address]         MILES TANNER MD             D: 2019   T: 2019   MT: al      Name:     BUCK CABAN   MRN:      [id number]        Account:      [id number]   :      1955           Service Date: 2019      Document: [id number]

## 2019-08-26 ENCOUNTER — DOCUMENTATION ONLY (OUTPATIENT)
Dept: TRANSPLANT | Facility: CLINIC | Age: 64
End: 2019-08-26

## 2019-08-26 ENCOUNTER — OFFICE VISIT (OUTPATIENT)
Dept: CARDIOLOGY | Facility: CLINIC | Age: 64
End: 2019-08-26
Attending: INTERNAL MEDICINE
Payer: COMMERCIAL

## 2019-08-26 VITALS — HEIGHT: 65 IN | BODY MASS INDEX: 34.24 KG/M2 | OXYGEN SATURATION: 96 % | WEIGHT: 205.5 LBS

## 2019-08-26 DIAGNOSIS — I50.20 SYSTOLIC HEART FAILURE (H): Primary | ICD-10-CM

## 2019-08-26 DIAGNOSIS — I50.9 CHF (NYHA CLASS II, ACC/AHA STAGE C) (H): Chronic | ICD-10-CM

## 2019-08-26 DIAGNOSIS — I50.22 CHRONIC SYSTOLIC HEART FAILURE (H): Primary | Chronic | ICD-10-CM

## 2019-08-26 DIAGNOSIS — I50.20 SYSTOLIC HEART FAILURE (H): ICD-10-CM

## 2019-08-26 DIAGNOSIS — I50.22 CHRONIC SYSTOLIC HEART FAILURE (H): Chronic | ICD-10-CM

## 2019-08-26 LAB
ANION GAP SERPL CALCULATED.3IONS-SCNC: 4 MMOL/L (ref 3–14)
BUN SERPL-MCNC: 33 MG/DL (ref 7–30)
CALCIUM SERPL-MCNC: 9.3 MG/DL (ref 8.5–10.1)
CHLORIDE SERPL-SCNC: 101 MMOL/L (ref 94–109)
CO2 SERPL-SCNC: 30 MMOL/L (ref 20–32)
CREAT SERPL-MCNC: 1.81 MG/DL (ref 0.66–1.25)
ERYTHROCYTE [DISTWIDTH] IN BLOOD BY AUTOMATED COUNT: 14.7 % (ref 10–15)
GFR SERPL CREATININE-BSD FRML MDRD: 38 ML/MIN/{1.73_M2}
GLUCOSE SERPL-MCNC: 211 MG/DL (ref 70–99)
HCT VFR BLD AUTO: 41.3 % (ref 40–53)
HGB BLD-MCNC: 13.1 G/DL (ref 13.3–17.7)
MCH RBC QN AUTO: 29.6 PG (ref 26.5–33)
MCHC RBC AUTO-ENTMCNC: 31.7 G/DL (ref 31.5–36.5)
MCV RBC AUTO: 93 FL (ref 78–100)
NT-PROBNP SERPL-MCNC: 2036 PG/ML (ref 0–125)
PLATELET # BLD AUTO: 200 10E9/L (ref 150–450)
POTASSIUM SERPL-SCNC: 4.4 MMOL/L (ref 3.4–5.3)
RBC # BLD AUTO: 4.43 10E12/L (ref 4.4–5.9)
SODIUM SERPL-SCNC: 136 MMOL/L (ref 133–144)
WBC # BLD AUTO: 6.8 10E9/L (ref 4–11)

## 2019-08-26 PROCEDURE — G0463 HOSPITAL OUTPT CLINIC VISIT: HCPCS | Mod: ZF

## 2019-08-26 PROCEDURE — 36415 COLL VENOUS BLD VENIPUNCTURE: CPT | Performed by: INTERNAL MEDICINE

## 2019-08-26 PROCEDURE — 83880 ASSAY OF NATRIURETIC PEPTIDE: CPT | Performed by: INTERNAL MEDICINE

## 2019-08-26 PROCEDURE — 80048 BASIC METABOLIC PNL TOTAL CA: CPT | Performed by: INTERNAL MEDICINE

## 2019-08-26 PROCEDURE — 99215 OFFICE O/P EST HI 40 MIN: CPT | Mod: GC | Performed by: INTERNAL MEDICINE

## 2019-08-26 PROCEDURE — 85027 COMPLETE CBC AUTOMATED: CPT | Performed by: INTERNAL MEDICINE

## 2019-08-26 RX ORDER — CARVEDILOL 6.25 MG/1
6.25 TABLET ORAL 2 TIMES DAILY WITH MEALS
Qty: 180 TABLET | Refills: 3 | Status: SHIPPED | OUTPATIENT
Start: 2019-08-26 | End: 2019-09-04

## 2019-08-26 RX ORDER — FUROSEMIDE 20 MG
20 TABLET ORAL 2 TIMES DAILY
Qty: 180 TABLET | Refills: 3 | Status: SHIPPED | OUTPATIENT
Start: 2019-08-26 | End: 2019-11-25

## 2019-08-26 ASSESSMENT — PAIN SCALES - GENERAL: PAINLEVEL: NO PAIN (0)

## 2019-08-26 ASSESSMENT — MIFFLIN-ST. JEOR: SCORE: 1644.02

## 2019-08-26 NOTE — PATIENT INSTRUCTIONS
Medication changes:  1.  Increase Lasix (furosemide) to twice daily, at breakfast and early afternoon.        Incremente el Lasix (furosemide) a dos veces al najma. Nikki vez en la manana con el desayuno y la otra por la tarde.      2.  Decrease Coreg (carvedilol) to 6.25 mg twice daily.        Disminuya Coreg (carvedilol) a 6.25 mg dos veces al najma.      3.  Keep working to maintain or decrease your weight.  It will be easier to to find a good-match donor if you are closer to your ideal body weight.         Siga trabajando en mantener lainez peso o perder peso. Sera mas facil encontrar un donador si esta cerca del peso requerido.      4.  Keep working to get your dental work complete.  When finished, have them send Bill a letter saying your mouth is in good health at fax #: [phone].  Then call Bill to arrange a time to bring you to the hospital for a trial of IV medicines.      Siga trabajando en completar lainez trabajo dental. Cuando termine aviseles para que manden la informcion a Bill confirmando que lainez terri dental esta savannah al sonia de fax que aparece arriba. Despues de eso Bill va a arreglar la lizandro para ser atendido en el hospital para la medicina a travez de nikki intravenosa.

## 2019-08-26 NOTE — LETTER
2019      RE: Lucian Oliveira Santiago Sr.  [address] [facility] [address]       Anna Archuleta PA-C   [address] [address]      RE: Lucian Oliveira   MRN: [id number]   : 1955      Dear Ms. Wrightcarolyn:      We had the pleasure of seeing Mr. Lucian Oliveira in our Advanced Heart Failure Clinic.  As you know, he is a 63-year-old male with ischemic cardiomyopathy and severe LV systolic dysfunction, who is currently on optimal medical therapy.      PAST MEDICAL HISTORY:   1.  Coronary artery disease, status post coronary artery bypass grafting surgery in .   2.  Ischemic cardiomyopathy with an estimated ejection fraction of 25%.   3.  Hypertension.   4.  Hyperlipidemia.   5.  Diabetes.   6.  Obesity.   7.  Obstructive sleep apnea.   8.  Chronic kidney disease.     Mr. Oliveira was seen last on 2019. During that visit, advanced heart failure options were discussed with patient. Given signs of end-stage heart disease (low VO2, decreased Cardiac index, and increased creatinine), he likely would need a heart transplant or LVAD. Therefore, evaluation was started for patient.     Today, patient states he is doing well. He reports that his heart failure symptoms have improved and that he was able [place] 6 [place] (walking for most of the time).     In terms of his LVAD workup, he just needs to complete dental work.      REVIEW OF SYSTEMS:  A detailed 10-point review of systems was obtained as described in the History of Present Illness.  All other systems reviewed and are negative.      MEDICATIONS:    Current Outpatient Medications   Medication Sig     aspirin 81 MG tablet Take 1 tablet (81 mg) by mouth daily     atorvastatin (LIPITOR) 40 MG tablet Take 1 tablet (40 mg) by mouth daily     bisacodyl (DULCOLAX) 5 MG EC tablet Take as directed by Endoscopy clinic     blood glucose (ACCU-CHEK SMARTVIEW) test strip USE TO TEST THREE TIMES  DAILY AS DIRECTED     blood glucose (NO BRAND SPECIFIED) test strip Use to test blood sugar 2 times daily or as directed.     blood glucose monitoring (ACCU-CHEK FELIPE SMARTVIEW) meter device kit Use to test blood sugar 3-4 times daily, as directed.     carvedilol (COREG) 25 MG tablet Take 0.5 tablets (12.5 mg) by mouth 2 times daily (with meals)     clopidogrel (PLAVIX) 75 MG tablet Take 1 tablet (75 mg) by mouth daily     furosemide (LASIX) 20 MG tablet Take 1 tablet (20 mg) by mouth daily     glimepiride (AMARYL) 4 MG tablet Take 1 tablet (4 mg) by mouth every morning (before breakfast)     hydrALAZINE (APRESOLINE) 25 MG tablet Take 1 tablet (25 mg) by mouth 3 times daily     insulin glargine (LANTUS SOLOSTAR) 100 UNIT/ML pen Take 8 units in the morning and 40 units in the evening     insulin pen needle (B-D U/F) 31G X 5 MM miscellaneous 1 Units by Device route 2 times daily Use 2 pen needles daily or as directed.     isosorbide mononitrate (IMDUR) 60 MG 24 hr tablet Take 1 tablet (60 mg) by mouth daily     losartan (COZAAR) 50 MG tablet Take 1 tablet (50 mg) by mouth daily     neomycin-polymyxin-dexamethasone (MAXITROL) 3.5-03267-7.1 ophthalmic ointment Place 0.5 inches into both eyes At Bedtime     nitroglycerin (NITROSTAT) 0.4 MG SL tablet Place 1 tablet (0.4 mg) under the tongue every 5 minutes as needed for chest pain If you are still having symptoms after 3 doses (15 minutes) call 911.     order for DME Auto CPAP 8-15 cmH20     sitagliptin (JANUVIA) 50 MG tablet Take 1 tablet (50 mg) by mouth daily     Current Facility-Administered Medications   Medication     bevacizumab (AVASTIN) intravitreal inj 1.25 mg     bevacizumab (AVASTIN) intravitreal inj 1.25 mg     erythromycin (ROMYCIN) ophthalmic ointment     lidocaine (PF) (XYLOCAINE) 2 % injection 10 mL     Facility-Administered Medications Ordered in Other Visits   Medication     sodium chloride (PF) 0.9% PF flush 10 mL     PHYSICAL EXAMINATION:    Ht 1.651  "m (5' 5\")   Wt 93.2 kg (205 lb 8 oz)   SpO2 96%   BMI 34.20 kg/m     General: No acute distress   HEENT: NC/AT   CV: RRR, +S1/S2, No murmurs, rubs, gallops. JVP is mildly elevated.     Pulm: Unlabored breathing, CTAB   GI: s/nt/nd   Ext: No edema   Neuro: No focal defects   Psych: Normal Affect    ECHO (03/2019)   Interpretation Summary  Moderate to severe left ventricular dilation is present.  Severely (EF 10-20%) reduced left ventricular function is present.  No significant valve dysfunction.  Compared to study done 6/5/17 there is no significant change in LV size and  systolic function.    Cardiopulmonary Stress Test 03/2019        Right Heart Catherization:   Baseline: Normal right sided filling pressures, elevated pulmonary pressures with mean PA of 40 mmHg and elevated left sided filling pressures with wedge of 40 mmHg. Cardiac output/index is 3.4/1.7 by TD and 4.3/2.2 by Jacy.  With IV Nipride 1.5 mcg/kg/min: Normal right sided filling pressures, PA pressure decreased to 21 mmHg and the left sided filling pressures decreased to a wedge of 14 mmHg. CO/CI increased to 5/2.5 by TD and 5.1/2.6 by Jacy.    ASSESSMENT AND PLAN:    Mr. Lucian Oliveira is a pleasant 63-year-old male with past medical history of hypertension, hyperlipidemia, diabetes, coronary artery disease, status post coronary artery bypass grafting surgery and ischemic cardiomyopathy, who returns today for followup.      Patient's primary issue is his ischemic cardiomyopathy. At this time, I would classify him as NYHA Class III. Despite patient feeling well with his symptoms, he does have objective evidence that is concerning for end stage heart failure including elevated creatinine (1.8-2.0), low VO2, decrease in BP during exercise, elevated PCWP and low CI. Therefore, discussion about advanced therapies occurred with patient. Patient at this time wants to continue thinking about heart transplantation/LVAD. He understands that if his end organ " function continues to decline (creatitine) while waiting, he may not be a candidate for either therapy.     If patient decides he wants to proceed with advanced therapies, the next step will  Involve admission to hospital where he would be stated on inotrope to determine his Cr improves. If his Cr improves, then the decision to proceed with advanced therapies becomes more clear. Depending on his hemodynamics, we can then determine an ideal strategy (list for transplantation vs. Proceed with LVAD). Given his body size and blood group (O), he will likely end up on LVAD therapy as a possible bridge to transplant. Patient understands this possibility.     #Ischemic Cardiomyopathy AHA Stage C/D, NYHA Class III   -As above, patient is finishing up the LVAD/Transplant workup (dental work is pending). He will then let us know what he decides about proceed with advanced therapies and we will bring him into the hospital as stated above to start inotropes.   -Continue ASA 81mg, Plavix 75mg, and Atorvastatin 40mg.   -Given his symptoms and low CI, we will decrease Coreg to 6.25mg BID. In addition, we will increase this Lasix to 20mg BID to due elevated wedge.   -Continue Hydralazine 25mg TID, Imdur 60mg daily, Losartan 50mg daily.    RTC pending patient's decision about advanced heart failure therapies.     Mavis Vera   Cardiology Fellow   Pager: [phone]    I have personally seen and examined the patient, and then discussed with Dr. Vera, and agree with the findings and plan in this note. I have reviewed today's vital signs, medications, labs, and imaging.     Sincerely,    Arvin Sheridan MD   [facility]  Section of Advanced Heart Failure   Cardiovascular Division  [facility]   [phone]        D: 2019   T: 2019   MT: al      Name:     BUCK ALFARO   MRN:      [id number]        Account:      [id number]   :      1955           Service Date: 2019       Document: [id number]        Arvin Sheridan MD

## 2019-08-26 NOTE — PROGRESS NOTES
Service Date: 07/29/2019      I was requested to see Mr. Oliveira for evaluation of options for end-stage heart failure.      HISTORY OF PRESENT ILLNESS:  The patient is a pleasant 63-year-old gentleman with a past medical history of ischemic cardiomyopathy, single coronary artery disease, status post coronary bypass graft in 2015 with worsening shortness of breath, fatigue and tiredness over the last several months.  He has also gained 10 pounds in the last 2 months.  He complains of worsening lower extremity swelling.  He denies fever, chills, syncope, palpitation, loss of consciousness.  He continues to be active and is able to walk over a block.      PAST MEDICAL HISTORY:   1. Coronary artery disease secondary to ischemic cardiomyopathy.   2. Coronary artery bypass grafting.      OUTPATIENT MEDICATIONS:   1. Aspirin.   2. Atorvastatin.   3. Insulin.   4. Januvia.   5. Glimepiride.   6. Lasix.   7. Plavix.   8. Carvedilol.      SOCIAL HISTORY:  Denies current alcohol, drug or tobacco abuse.      ALLERGIES:  REVIEWED.      REVIEW OF SYSTEMS:   CONSTITUTIONAL:  Fatigue, tiredness.   RESPIRATORY:  Short of breath.   CARDIOVASCULAR:  Congestive heart failure.  Geodon.   GENITOURINARY:  None.   GASTROINTESTINAL:  None.   ENDOCRINE:  None.   MUSCULOSKELETAL:  None.   NEUROLOGIC:  None.   PSYCHIATRIC:  None.   INTEGUMENT:  None.   EYES:  None.   ENT:  None.      PHYSICAL EXAMINATION:   VITAL SIGNS:  Afebrile, blood pressure 110/74, heart rate 72.   GENERAL:  He is awake, alert, oriented x 3, appears comfortable at rest.   CARDIOVASCULAR SYSTEM:  S1, S2 normal.  S3 positive, 2/6 systolic murmur left sternal border.   RESPIRATORY:  Bilateral breath sounds.  No rales, rhonchi or crepitations.   ABDOMEN:  Bowel sounds present, nontender.   LOWER EXTREMITIES:  Warm and well perfused.  Mild pedal edema.   NEUROLOGICAL:  No focal deficits.   EYES:  PERRLA.   DERMATOLOGICAL:  Within normal limits.      LABORATORY DATA:   Electrolytes within normal limits, creatinine 2.08, White count 7.7, hemoglobin 13, hematocrit 42, platelet count 235.  INR 1.      I reviewed his echocardiogram performed in 2019 that showed moderate to severe left ventricular dilatation, severely reduced ejection fraction.  Ejection fraction of 10%-20%.  Aortic valve is normal.  Tricuspid valve is normal.  Mitral valve is normal.  Left ventricular end-diastolic is 6.5 cm.  The patient underwent a right heart catheterization in 2019 that showed right PA pressure 32 x 15 with a mean of 21, pulmonary capillary wedge pressure of 14.  CVP 8, cardiac index 2.5.      ASSESSMENT AND PLAN:  A 63-year-old gentleman with ischemic cardiomyopathy, recurrent congestive heart failure.  The patient is being evaluated for left ventricle assist device as a bridge to heart transplantation.  I discussed the risks and benefits of both options with the patient and his family including risk of death, stroke, bleeding, wound, renal failure, arrhythmias.  We will also discuss management options.  Heart failure meeting as well as Dr. Sheridan.  If there are any questions regarding his care, please contact me.            MILES TNANER MD             D: 2019   T: 2019   MT: MAUREEN      Name:     BUCK CABAN   MRN:      [id number]        Account:      [id number]   :      1955           Service Date: 2019      Document: [id number]       cc: Arvin Sheridan MD       [facility] Billing

## 2019-08-26 NOTE — LETTER
2019      RE: Lucian Oliveira Santiago Sr.  [address] [facility] [address]       Dear Colleague,    Thank you for the opportunity to participate in the care of your patient, Lucian Henderson Sr., at the [facility]. Please see a copy of my visit note below.    Anna Archuleta PA-C   [address] [place] [address]      RE: Lucian Oliveira   MRN: [id number]   : 1955      Dear Ms. Archuleta:      We had the pleasure of seeing Mr. Lucian Oliveira in our Advanced Heart Failure Clinic.  As you know, he is a 63-year-old male with ischemic cardiomyopathy and severe LV systolic dysfunction, who is currently on optimal medical therapy.      PAST MEDICAL HISTORY:   1.  Coronary artery disease, status post coronary artery bypass grafting surgery in .   2.  Ischemic cardiomyopathy with an estimated ejection fraction of 25%.   3.  Hypertension.   4.  Hyperlipidemia.   5.  Diabetes.   6.  Obesity.   7.  Obstructive sleep apnea.   8.  Chronic kidney disease.     Mr. Oliveira was seen last on 2019. During that visit, advanced heart failure options were discussed with patient. Given signs of end-stage heart disease (low VO2, decreased Cardiac index, and increased creatinine), he likely would need a heart transplant or LVAD. Therefore, evaluation was started for patient.     Today, patient states he is doing well. He reports that his heart failure symptoms have improved and that [place] (walking for most of the time).     In terms of his LVAD workup, he just needs to complete dental work.      REVIEW OF SYSTEMS:  A detailed 10-point review of systems was obtained as described in the History of Present Illness.  All other systems reviewed and are negative.      MEDICATIONS:    Current Outpatient Medications   Medication Sig     aspirin 81 MG tablet Take 1 tablet (81 mg)  by mouth daily     atorvastatin (LIPITOR) 40 MG tablet Take 1 tablet (40 mg) by mouth daily     bisacodyl (DULCOLAX) 5 MG EC tablet Take as directed by Endoscopy clinic     blood glucose (ACCU-CHEK SMARTVIEW) test strip USE TO TEST THREE TIMES DAILY AS DIRECTED     blood glucose (NO BRAND SPECIFIED) test strip Use to test blood sugar 2 times daily or as directed.     blood glucose monitoring (ACCU-CHEK FELIPE SMARTVIEW) meter device kit Use to test blood sugar 3-4 times daily, as directed.     carvedilol (COREG) 25 MG tablet Take 0.5 tablets (12.5 mg) by mouth 2 times daily (with meals)     clopidogrel (PLAVIX) 75 MG tablet Take 1 tablet (75 mg) by mouth daily     furosemide (LASIX) 20 MG tablet Take 1 tablet (20 mg) by mouth daily     glimepiride (AMARYL) 4 MG tablet Take 1 tablet (4 mg) by mouth every morning (before breakfast)     hydrALAZINE (APRESOLINE) 25 MG tablet Take 1 tablet (25 mg) by mouth 3 times daily     insulin glargine (LANTUS SOLOSTAR) 100 UNIT/ML pen Take 8 units in the morning and 40 units in the evening     insulin pen needle (B-D U/F) 31G X 5 MM miscellaneous 1 Units by Device route 2 times daily Use 2 pen needles daily or as directed.     isosorbide mononitrate (IMDUR) 60 MG 24 hr tablet Take 1 tablet (60 mg) by mouth daily     losartan (COZAAR) 50 MG tablet Take 1 tablet (50 mg) by mouth daily     neomycin-polymyxin-dexamethasone (MAXITROL) 3.5-96447-4.1 ophthalmic ointment Place 0.5 inches into both eyes At Bedtime     nitroglycerin (NITROSTAT) 0.4 MG SL tablet Place 1 tablet (0.4 mg) under the tongue every 5 minutes as needed for chest pain If you are still having symptoms after 3 doses (15 minutes) call 911.     order for DME Auto CPAP 8-15 cmH20     sitagliptin (JANUVIA) 50 MG tablet Take 1 tablet (50 mg) by mouth daily     Current Facility-Administered Medications   Medication     bevacizumab (AVASTIN) intravitreal inj 1.25 mg     bevacizumab (AVASTIN) intravitreal inj 1.25 mg      "erythromycin (ROMYCIN) ophthalmic ointment     lidocaine (PF) (XYLOCAINE) 2 % injection 10 mL     Facility-Administered Medications Ordered in Other Visits   Medication     sodium chloride (PF) 0.9% PF flush 10 mL     PHYSICAL EXAMINATION:    Ht 1.651 m (5' 5\")   Wt 93.2 kg (205 lb 8 oz)   SpO2 96%   BMI 34.20 kg/m     General: No acute distress   HEENT: NC/AT   CV: RRR, +S1/S2, No murmurs, rubs, gallops. JVP is mildly elevated.     Pulm: Unlabored breathing, CTAB   GI: s/nt/nd   Ext: No edema   Neuro: No focal defects   Psych: Normal Affect    ECHO (03/2019)   Interpretation Summary  Moderate to severe left ventricular dilation is present.  Severely (EF 10-20%) reduced left ventricular function is present.  No significant valve dysfunction.  Compared to study done 6/5/17 there is no significant change in LV size and  systolic function.    Cardiopulmonary Stress Test 03/2019        Right Heart Catherization:   Baseline: Normal right sided filling pressures, elevated pulmonary pressures with mean PA of 40 mmHg and elevated left sided filling pressures with wedge of 40 mmHg. Cardiac output/index is 3.4/1.7 by TD and 4.3/2.2 by Jacy.  With IV Nipride 1.5 mcg/kg/min: Normal right sided filling pressures, PA pressure decreased to 21 mmHg and the left sided filling pressures decreased to a wedge of 14 mmHg. CO/CI increased to 5/2.5 by TD and 5.1/2.6 by Jacy.    ASSESSMENT AND PLAN:    Mr. Lucian Oliveira is a pleasant 63-year-old male with past medical history of hypertension, hyperlipidemia, diabetes, coronary artery disease, status post coronary artery bypass grafting surgery and ischemic cardiomyopathy, who returns today for followup.      Patient's primary issue is his ischemic cardiomyopathy. At this time, I would classify him as NYHA Class III. Despite patient feeling well with his symptoms, he does have objective evidence that is concerning for end stage heart failure including elevated creatinine (1.8-2.0), low " VO2, decrease in BP during exercise, elevated PCWP and low CI. Therefore, discussion about advanced therapies occurred with patient. Patient at this time wants to continue thinking about heart transplantation/LVAD. He understands that if his end organ function continues to decline (creatitine) while waiting, he may not be a candidate for either therapy.     If patient decides he wants to proceed with advanced therapies, the next step will  Involve admission to hospital where he would be stated on inotrope to determine his Cr improves. If his Cr improves, then the decision to proceed with advanced therapies becomes more clear. Depending on his hemodynamics, we can then determine an ideal strategy (list for transplantation vs. Proceed with LVAD). Given his body size and blood group (O), he will likely end up on LVAD therapy as a possible bridge to transplant. Patient understands this possibility.     #Ischemic Cardiomyopathy AHA Stage C/D, NYHA Class III   -As above, patient is finishing up the LVAD/Transplant workup (dental work is pending). He will then let us know what he decides about proceed with advanced therapies and we will bring him into the hospital as stated above to start inotropes.   -Continue ASA 81mg, Plavix 75mg, and Atorvastatin 40mg.   -Given his symptoms and low CI, we will decrease Coreg to 6.25mg BID. In addition, we will increase this Lasix to 20mg BID to due elevated wedge.   -Continue Hydralazine 25mg TID, Imdur 60mg daily, Losartan 50mg daily.    RTC pending patient's decision about advanced heart failure therapies.     Mavis Vera   Cardiology Fellow   Pager: [phone]    I have personally seen and examined the patient, and then discussed with Dr. Vera, and agree with the findings and plan in this note. I have reviewed today's vital signs, medications, labs, and imaging.   Sincerely,  Arvin Sheridan MD   Center for Pulmonary Hypertension  Section of Advanced Heart Failure    Cardiovascular Division  [facility]   [phone]        D: 2019   T: 2019   MT: al      Name:     BUCK ALFARO   MRN:      [id number]        Account:      [id number]   :      1955           Service Date: 2019      Document: [id number]

## 2019-08-26 NOTE — PROGRESS NOTES
Anna Archuleta PA-C   [facility]   [id number] [place] [address] [place] [address]      RE: Lucian Oliveira   MRN: [id number]   : 1955      Dear Ms. Archuleta:      We had the pleasure of seeing Mr. Lucian Oliveira in our Advanced Heart Failure Clinic.  As you know, he is a 63-year-old male with ischemic cardiomyopathy and severe LV systolic dysfunction, who is currently on optimal medical therapy.      PAST MEDICAL HISTORY:   1.  Coronary artery disease, status post coronary artery bypass grafting surgery in .   2.  Ischemic cardiomyopathy with an estimated ejection fraction of 25%.   3.  Hypertension.   4.  Hyperlipidemia.   5.  Diabetes.   6.  Obesity.   7.  Obstructive sleep apnea.   8.  Chronic kidney disease.     Mr. Oliveira was seen last on 2019. During that visit, advanced heart failure options were discussed with patient. Given signs of end-stage heart disease (low VO2, decreased Cardiac index, and increased creatinine), he likely would need a heart transplant or LVAD. Therefore, evaluation was started for patient.     Today, patient states he is doing well. He reports that his heart failure symptoms have improved and that he was able to attend the [facility] for 6 hours (walking for most of the time).     In terms of his LVAD workup, he just needs to complete dental work.      REVIEW OF SYSTEMS:  A detailed 10-point review of systems was obtained as described in the History of Present Illness.  All other systems reviewed and are negative.      MEDICATIONS:    Current Outpatient Medications   Medication Sig     aspirin 81 MG tablet Take 1 tablet (81 mg) by mouth daily     atorvastatin (LIPITOR) 40 MG tablet Take 1 tablet (40 mg) by mouth daily     bisacodyl (DULCOLAX) 5 MG EC tablet Take as directed by Endoscopy clinic     blood glucose (ACCU-CHEK SMARTVIEW) test strip USE TO TEST THREE TIMES DAILY AS DIRECTED     blood glucose (NO BRAND SPECIFIED) test strip Use to test blood sugar 2 times  "daily or as directed.     blood glucose monitoring (ACCU-CHEK FELIPE SMARTVIEW) meter device kit Use to test blood sugar 3-4 times daily, as directed.     carvedilol (COREG) 25 MG tablet Take 0.5 tablets (12.5 mg) by mouth 2 times daily (with meals)     clopidogrel (PLAVIX) 75 MG tablet Take 1 tablet (75 mg) by mouth daily     furosemide (LASIX) 20 MG tablet Take 1 tablet (20 mg) by mouth daily     glimepiride (AMARYL) 4 MG tablet Take 1 tablet (4 mg) by mouth every morning (before breakfast)     hydrALAZINE (APRESOLINE) 25 MG tablet Take 1 tablet (25 mg) by mouth 3 times daily     insulin glargine (LANTUS SOLOSTAR) 100 UNIT/ML pen Take 8 units in the morning and 40 units in the evening     insulin pen needle (B-D U/F) 31G X 5 MM miscellaneous 1 Units by Device route 2 times daily Use 2 pen needles daily or as directed.     isosorbide mononitrate (IMDUR) 60 MG 24 hr tablet Take 1 tablet (60 mg) by mouth daily     losartan (COZAAR) 50 MG tablet Take 1 tablet (50 mg) by mouth daily     neomycin-polymyxin-dexamethasone (MAXITROL) 3.5-12048-1.1 ophthalmic ointment Place 0.5 inches into both eyes At Bedtime     nitroglycerin (NITROSTAT) 0.4 MG SL tablet Place 1 tablet (0.4 mg) under the tongue every 5 minutes as needed for chest pain If you are still having symptoms after 3 doses (15 minutes) call 911.     order for DME Auto CPAP 8-15 cmH20     sitagliptin (JANUVIA) 50 MG tablet Take 1 tablet (50 mg) by mouth daily     Current Facility-Administered Medications   Medication     bevacizumab (AVASTIN) intravitreal inj 1.25 mg     bevacizumab (AVASTIN) intravitreal inj 1.25 mg     erythromycin (ROMYCIN) ophthalmic ointment     lidocaine (PF) (XYLOCAINE) 2 % injection 10 mL     Facility-Administered Medications Ordered in Other Visits   Medication     sodium chloride (PF) 0.9% PF flush 10 mL     PHYSICAL EXAMINATION:    Ht 1.651 m (5' 5\")   Wt 93.2 kg (205 lb 8 oz)   SpO2 96%   BMI 34.20 kg/m    General: No acute distress "   HEENT: NC/AT   CV: RRR, +S1/S2, No murmurs, rubs, gallops. JVP is mildly elevated.     Pulm: Unlabored breathing, CTAB   GI: s/nt/nd   Ext: No edema   Neuro: No focal defects   Psych: Normal Affect    ECHO (03/2019)   Interpretation Summary  Moderate to severe left ventricular dilation is present.  Severely (EF 10-20%) reduced left ventricular function is present.  No significant valve dysfunction.  Compared to study done 6/5/17 there is no significant change in LV size and  systolic function.    Cardiopulmonary Stress Test 03/2019        Right Heart Catherization:   Baseline: Normal right sided filling pressures, elevated pulmonary pressures with mean PA of 40 mmHg and elevated left sided filling pressures with wedge of 40 mmHg. Cardiac output/index is 3.4/1.7 by TD and 4.3/2.2 by Jacy.  With IV Nipride 1.5 mcg/kg/min: Normal right sided filling pressures, PA pressure decreased to 21 mmHg and the left sided filling pressures decreased to a wedge of 14 mmHg. CO/CI increased to 5/2.5 by TD and 5.1/2.6 by Jacy.    ASSESSMENT AND PLAN:    Mr. Lucian Oliveira is a pleasant 63-year-old male with past medical history of hypertension, hyperlipidemia, diabetes, coronary artery disease, status post coronary artery bypass grafting surgery and ischemic cardiomyopathy, who returns today for followup.      Patient's primary issue is his ischemic cardiomyopathy. At this time, I would classify him as NYHA Class III. Despite patient feeling well with his symptoms, he does have objective evidence that is concerning for end stage heart failure including elevated creatinine (1.8-2.0), low VO2, decrease in BP during exercise, elevated PCWP and low CI. Therefore, discussion about advanced therapies occurred with patient. Patient at this time wants to continue thinking about heart transplantation/LVAD. He understands that if his end organ function continues to decline (creatitine) while waiting, he may not be a candidate for either  therapy.     If patient decides he wants to proceed with advanced therapies, the next step will  Involve admission to hospital where he would be stated on inotrope to determine his Cr improves. If his Cr improves, then the decision to proceed with advanced therapies becomes more clear. Depending on his hemodynamics, we can then determine an ideal strategy (list for transplantation vs. Proceed with LVAD). Given his body size and blood group (O), he will likely end up on LVAD therapy as a possible bridge to transplant. Patient understands this possibility.     #Ischemic Cardiomyopathy AHA Stage C/D, NYHA Class III   -As above, patient is finishing up the LVAD/Transplant workup (dental work is pending). He will then let us know what he decides about proceed with advanced therapies and we will bring him into the hospital as stated above to start inotropes.   -Continue ASA 81mg, Plavix 75mg, and Atorvastatin 40mg.   -Given his symptoms and low CI, we will decrease Coreg to 6.25mg BID. In addition, we will increase this Lasix to 20mg BID to due elevated wedge.   -Continue Hydralazine 25mg TID, Imdur 60mg daily, Losartan 50mg daily.    RTC pending patient's decision about advanced heart failure therapies.     Mavis Vera   Cardiology Fellow   Pager: [phone]    I have personally seen and examined the patient, and then discussed with Dr. Vera, and agree with the findings and plan in this note. I have reviewed today's vital signs, medications, labs, and imaging.     Sincerely,    Arvin Sheridan MD   Center for Pulmonary Hypertension  Section of Advanced Heart Failure   Cardiovascular Division  [facility]   [phone]        D: 2019   T: 2019   MT: al      Name:     BUCK ALFARO   MRN:      [id number]        Account:      [id number]   :      1955           Service Date: 2019      Document: [id number]

## 2019-08-26 NOTE — NURSING NOTE
Vitals completed successfully and medication reconciled.     Anna Aguero, KUSUM  2:47 PM  Chief Complaint   Patient presents with     Follow Up     : 63 year old male presents with systolic heart failure for follow up with labs prior after advanced therapy evaluation is complete

## 2019-08-26 NOTE — PROGRESS NOTES
Clinic with Dr. Sheridan:  Lucian and his wife were seen today by Dr Sheridan, and the results and recommendations from his recent transplant/VAD evaluation were reviewed.  Lucian was told he will likely need a VAD.  This is a big surgery, and has potential complications including infection, bleeding, etc, and he would be responsible for taking his many meds and coming frequently for clinic appts.  However, our hope is that it will improve his functional capacity while he waits for a donor heart.   Dr Sheridan made it clear that the decision has to be Lucian's however, and he has to want these therapies.  We are not going to talk him in to doing something he's not interested in doing.    Currently, Lucian still needs much dental work done.  It was explained that the risk of infection of the heart pump (VAD) and of his heart is significant, so that this needs to be done before any surgery can take place.  Lucian plans to contact his local dentist for an appt, rather than returning to the [facility] at [facility].  However, his dentist is out of town this week, so they will call for an appt next week.    Once dental clearance is complete, Lucian will call this writer in the Transplant Office to arrange for a RHC and trial of inotropes, to see if his renal function improves.  If it does, we can consider listing him for two weeks for transplant.  If a suitable donor is not identified, the plan would be to proceed to a VAD implant.  As a back-up, Lucian has also been scheduled for a f/u clinic visit in 3 months.    Also at today's visit, Dr Sheridan instructed Lucian to increase his lasix from 20 daily to 20 mg BID for fluid retention.   He also cut back Lucian's Coreg, from 12.5 mg BID to 6.25 mg BID.  Both new prescriptions were called to [facility] on [address].  Lastly, Lucian was instructed to keep working on weight loss, to keep his BMI below 35, which is the cut-off for heart transplant.    Written instructions were provided and translated into Vietnamese by the .

## 2019-08-27 DIAGNOSIS — E11.3513 TYPE 2 DIABETES MELLITUS WITH PROLIFERATIVE RETINOPATHY OF BOTH EYES AND MACULAR EDEMA, UNSPECIFIED WHETHER LONG TERM INSULIN USE (H): Primary | ICD-10-CM

## 2019-08-29 ENCOUNTER — OFFICE VISIT (OUTPATIENT)
Dept: OPHTHALMOLOGY | Facility: CLINIC | Age: 64
End: 2019-08-29
Attending: OPHTHALMOLOGY
Payer: COMMERCIAL

## 2019-08-29 DIAGNOSIS — E11.3513 TYPE 2 DIABETES MELLITUS WITH PROLIFERATIVE RETINOPATHY OF BOTH EYES AND MACULAR EDEMA, UNSPECIFIED WHETHER LONG TERM INSULIN USE (H): ICD-10-CM

## 2019-08-29 PROCEDURE — 40000269 ZZH STATISTIC NO CHARGE FACILITY FEE: Mod: ZF

## 2019-08-29 PROCEDURE — 67028 INJECTION EYE DRUG: CPT | Mod: RT,ZF | Performed by: OPHTHALMOLOGY

## 2019-08-29 PROCEDURE — C9257 BEVACIZUMAB INJECTION: HCPCS | Mod: ZF | Performed by: OPHTHALMOLOGY

## 2019-08-29 PROCEDURE — 25000128 H RX IP 250 OP 636: Mod: ZF | Performed by: OPHTHALMOLOGY

## 2019-08-29 PROCEDURE — 92134 CPTRZ OPH DX IMG PST SGM RTA: CPT | Mod: ZF | Performed by: OPHTHALMOLOGY

## 2019-08-29 PROCEDURE — G0463 HOSPITAL OUTPT CLINIC VISIT: HCPCS | Mod: ZF

## 2019-08-29 RX ADMIN — Medication 1.25 MG: at 15:23

## 2019-08-29 ASSESSMENT — VISUAL ACUITY
METHOD: SNELLEN - LINEAR
OD_SC: 20/40
OS_SC: 20/70
OD_SC+: +1
OS_SC+: +2

## 2019-08-29 ASSESSMENT — EXTERNAL EXAM - LEFT EYE: OS_EXAM: NORMAL

## 2019-08-29 ASSESSMENT — TONOMETRY
OD_IOP_MMHG: 14
IOP_METHOD: TONOPEN
OS_IOP_MMHG: 15

## 2019-08-29 ASSESSMENT — CONF VISUAL FIELD
OD_NORMAL: 1
OS_NORMAL: 1
METHOD: COUNTING FINGERS

## 2019-08-29 ASSESSMENT — EXTERNAL EXAM - RIGHT EYE: OD_EXAM: NORMAL

## 2019-08-29 ASSESSMENT — SLIT LAMP EXAM - LIDS
COMMENTS: PTOSIS OD>OS
COMMENTS: PTOSIS OD>OS

## 2019-08-29 ASSESSMENT — CUP TO DISC RATIO
OS_RATIO: 0.2
OD_RATIO: 0.2

## 2019-08-29 NOTE — NURSING NOTE
Chief Complaints and History of Present Illnesses   Patient presents with     Diabetic Retinopathy Follow Up     Chief Complaint(s) and History of Present Illness(es)     Diabetic Retinopathy Follow Up     Laterality: both eyes    Quality: blurred vision    Timing: throughout the day    Course: gradually improving    Associated symptoms: Negative for eye pain, redness and tearing    Pain scale: 0/10              Comments     Pt feels VA improved.  Desires clear understanding of what his eye disease is.  Using art tears 4 times a day to BE; samples have been provided by retina clinic. / Not using Maxitrol hay now, but would like updated prescription as to have on hand.  Maddison Mayen, COT COT 2:32 PM 08/29/2019

## 2019-08-29 NOTE — PROGRESS NOTES
CC: follow up proliferative diabetic retinopathy    s/p PRP left eye 6/5/29, PRP right eye 6/12/19    Interval Update: reports doing better, no interval changes. No distortion in vision each eye. No flashes, floaters, shadow/curtain over vision.  Reports some changes in vision left eye     HPI: Two weeks prior to presentation to retina clinic, pt woke up and suddenly had blurred vision in both eyes. He went to the ED for possible stroke workup but was found to have count fingers visual acuity. He was evaluated by Dr. Ronak Perez and found to have bilateral vitreous hemorrhages.    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 No neovascularization of the disc. Microaneurysms   - left eye: neovascularization elsewhere, severe peripheral ischemia. (+) neovascularization of the disc. Microaneurysms     optos pic consistent with exam  optos Autofluorescence hypoautofluorescence of the hemes    Optical Coherence Tomography 8-29-19  - RE: vitreous opacities; rare cystic changes    - LE: Epiretinal membrane. Stable to improved IRF.     Prior Extramacular Optical Coherence Tomography showed tractional retinal detachment about 4DD inferior to the macula and another Tractional retinal detachment supero-temporal quadrant left eye     Assessment and Plan  1. Proliferative diabetic retinopathy with vitreous hemorrhages, both eyes   S/p PRP left eye on 6/5/19    S/p PRP right eye on 6/12/19   S/p Avastin both eyes     - Localized outside macula Tractional retinal detachment in left eye  -  Urgent vitrectomy not necessary at this point for left eye but will reassess at intervals given his cardiorespiratory comorbidities make general anesthesia very high risk.   - today stable eye exam    2. Nuclear sclerotic and cortical cataracts, both eyes  - will need cataract evaluation     3. Epiretinal membrane left eye   - will consider surgery in the future    return to clinic: 1 month for avastin inj both eyes -   intraocular lens calculations. Cataract evaluation   Will consider to schedule Cataract extraction intraocular lens, Pars plana vitrectomy (PPV), endolaser and membrane peel left eye     ~~~~~~~~~~~~~~~~~~~~~~~~~~~~~~~~~~   Complete documentation of historical and exam elements from today's encounter can be found in the full encounter summary report (not reduplicated in this progress note).  I personally obtained the chief complaint(s) and history of present illness.  I confirmed and edited as necessary the review of systems, past medical/surgical history, family history, social history, and examination findings as documented by others; and I examined the patient myself.  I personally reviewed the relevant tests, images, and reports as documented above.  I personally reviewed the ophthalmic test(s) associated with this encounter, agree with the interpretation(s) as documented by the resident/fellow, and have edited the corresponding report(s) as necessary.   I formulated and edited as necessary the assessment and plan and discussed the findings and management plan with the patient and family and I was present for the entire procedure performed by the resident/fellow.    Triny Bunch MD   of Ophthalmology.  Retina Service   Department of Ophthalmology and Visual Neurosciences   [facility]  Phone: [phone]   Fax: [phone]

## 2019-09-04 DIAGNOSIS — E11.65 TYPE 2 DIABETES MELLITUS WITH HYPERGLYCEMIA, WITH LONG-TERM CURRENT USE OF INSULIN (H): ICD-10-CM

## 2019-09-04 DIAGNOSIS — R06.2 WHEEZING: ICD-10-CM

## 2019-09-04 DIAGNOSIS — Z79.4 TYPE 2 DIABETES MELLITUS WITH STAGE 3 CHRONIC KIDNEY DISEASE, WITH LONG-TERM CURRENT USE OF INSULIN (H): ICD-10-CM

## 2019-09-04 DIAGNOSIS — E11.22 TYPE 2 DIABETES MELLITUS WITH STAGE 3 CHRONIC KIDNEY DISEASE, WITH LONG-TERM CURRENT USE OF INSULIN (H): ICD-10-CM

## 2019-09-04 DIAGNOSIS — Z79.4 TYPE 2 DIABETES MELLITUS WITH DIABETIC NEPHROPATHY, WITH LONG-TERM CURRENT USE OF INSULIN (H): Chronic | ICD-10-CM

## 2019-09-04 DIAGNOSIS — E11.21 TYPE 2 DIABETES MELLITUS WITH DIABETIC NEPHROPATHY, WITH LONG-TERM CURRENT USE OF INSULIN (H): Chronic | ICD-10-CM

## 2019-09-04 DIAGNOSIS — Z79.4 TYPE 2 DIABETES MELLITUS WITH HYPERGLYCEMIA, WITH LONG-TERM CURRENT USE OF INSULIN (H): ICD-10-CM

## 2019-09-04 DIAGNOSIS — N18.30 CKD (CHRONIC KIDNEY DISEASE) STAGE 3, GFR 30-59 ML/MIN (H): Chronic | ICD-10-CM

## 2019-09-04 DIAGNOSIS — I50.9 CHF (NYHA CLASS II, ACC/AHA STAGE C) (H): Chronic | ICD-10-CM

## 2019-09-04 DIAGNOSIS — I10 HYPERTENSION GOAL BP (BLOOD PRESSURE) < 140/90: Chronic | ICD-10-CM

## 2019-09-04 DIAGNOSIS — N18.30 TYPE 2 DIABETES MELLITUS WITH STAGE 3 CHRONIC KIDNEY DISEASE, WITH LONG-TERM CURRENT USE OF INSULIN (H): ICD-10-CM

## 2019-09-04 DIAGNOSIS — E11.9 DIABETES MELLITUS, TYPE 2 (H): ICD-10-CM

## 2019-09-04 DIAGNOSIS — I50.22 CHRONIC SYSTOLIC HEART FAILURE (H): ICD-10-CM

## 2019-09-04 RX ORDER — ATORVASTATIN CALCIUM 40 MG/1
40 TABLET, FILM COATED ORAL DAILY
Qty: 90 TABLET | Refills: 2 | Status: ON HOLD | OUTPATIENT
Start: 2019-09-04 | End: 2020-08-03

## 2019-09-04 RX ORDER — CARVEDILOL 6.25 MG/1
6.25 TABLET ORAL 2 TIMES DAILY WITH MEALS
Qty: 180 TABLET | Refills: 2 | Status: SHIPPED | OUTPATIENT
Start: 2019-09-04 | End: 2019-11-25

## 2019-09-04 RX ORDER — GLIMEPIRIDE 4 MG/1
4 TABLET ORAL
Qty: 90 TABLET | Refills: 3 | Status: SHIPPED | OUTPATIENT
Start: 2019-09-04 | End: 2019-11-25

## 2019-09-04 NOTE — TELEPHONE ENCOUNTER
glimepiride (AMARYL) 4 MG tablet      Last Written Prescription Date:  9-11-18  Last Fill Quantity: 180,   # refills: 3  Last Office Visit : 8-8-19  Future Office visit:  11-14-19    Routing refill request to provider for review/approval because:  Abnormal Lab: Cr    sitagliptin (JANUVIA) 50 MG tablet      Last Written Prescription Date:  9-11-18  Last Fill Quantity: 90,   # refills: 5    Routing refill request to provider for review/approval because:  Abnormal lab: Cr    blood glucose monitoring (ONE TOUCH DELICA) lancets      Last Written Prescription Date:  NOT on med list    Routing refill request to provider for review/approval because:  Drug not active on patient's medication list        insulin glargine (LANTUS SOLOSTAR) 100 UNIT/ML pen      Last Written Prescription Date:  9-11-18  Last Fill Quantity: 45 ml,   # refills: 3  Last Office Visit : 8-8-19  Future Office visit:  11-14-19    Routing refill request to provider for review/approval because:  Insulin - refilled per clinic

## 2019-09-05 DIAGNOSIS — I50.22 CHRONIC SYSTOLIC HEART FAILURE (H): ICD-10-CM

## 2019-09-05 DIAGNOSIS — R06.2 WHEEZING: ICD-10-CM

## 2019-09-05 DIAGNOSIS — I25.739 CORONARY ARTERY DISEASE INVOLVING NONAUTOLOGOUS BIOLOGICAL CORONARY BYPASS GRAFT WITH ANGINA PECTORIS (H): ICD-10-CM

## 2019-09-05 DIAGNOSIS — N18.30 CKD (CHRONIC KIDNEY DISEASE) STAGE 3, GFR 30-59 ML/MIN (H): Chronic | ICD-10-CM

## 2019-09-05 DIAGNOSIS — I50.9 CHF (NYHA CLASS II, ACC/AHA STAGE C) (H): Chronic | ICD-10-CM

## 2019-09-06 RX ORDER — LOSARTAN POTASSIUM 50 MG/1
50 TABLET ORAL DAILY
Qty: 90 TABLET | Refills: 3 | Status: ON HOLD | OUTPATIENT
Start: 2019-09-06 | End: 2020-08-03

## 2019-09-06 RX ORDER — HYDRALAZINE HYDROCHLORIDE 25 MG/1
25 TABLET, FILM COATED ORAL 3 TIMES DAILY
Qty: 270 TABLET | Refills: 3 | Status: ON HOLD | OUTPATIENT
Start: 2019-09-06 | End: 2020-08-03

## 2019-09-06 RX ORDER — ISOSORBIDE MONONITRATE 60 MG/1
60 TABLET, EXTENDED RELEASE ORAL DAILY
Qty: 90 TABLET | Refills: 3 | Status: ON HOLD | OUTPATIENT
Start: 2019-09-06 | End: 2020-08-03

## 2019-09-06 NOTE — TELEPHONE ENCOUNTER
Last Clinic Visit: 8/26/2019  [facility]  Elevated creat addressed and no medication changes

## 2019-09-11 ENCOUNTER — ANCILLARY PROCEDURE (OUTPATIENT)
Dept: CARDIOLOGY | Facility: CLINIC | Age: 64
End: 2019-09-11
Attending: INTERNAL MEDICINE
Payer: COMMERCIAL

## 2019-09-11 DIAGNOSIS — I42.9 CARDIOMYOPATHY (H): ICD-10-CM

## 2019-09-11 LAB
MDC_IDC_EPISODE_DTM: NORMAL
MDC_IDC_EPISODE_DURATION: 10 S
MDC_IDC_EPISODE_DURATION: 17 S
MDC_IDC_EPISODE_DURATION: 7 S
MDC_IDC_EPISODE_DURATION: 8 S
MDC_IDC_EPISODE_DURATION: 8 S
MDC_IDC_EPISODE_DURATION: 9 S
MDC_IDC_EPISODE_ID: NORMAL
MDC_IDC_EPISODE_TYPE: NORMAL
MDC_IDC_LEAD_IMPLANT_DT: NORMAL
MDC_IDC_LEAD_LOCATION: NORMAL
MDC_IDC_LEAD_LOCATION_DETAIL_1: NORMAL
MDC_IDC_LEAD_MFG: NORMAL
MDC_IDC_LEAD_MODEL: NORMAL
MDC_IDC_LEAD_POLARITY_TYPE: NORMAL
MDC_IDC_LEAD_SERIAL: NORMAL
MDC_IDC_MSMT_BATTERY_DTM: NORMAL
MDC_IDC_MSMT_BATTERY_REMAINING_LONGEVITY: 114 MO
MDC_IDC_MSMT_BATTERY_REMAINING_PERCENTAGE: 100 %
MDC_IDC_MSMT_BATTERY_STATUS: NORMAL
MDC_IDC_MSMT_CAP_CHARGE_DTM: NORMAL
MDC_IDC_MSMT_CAP_CHARGE_TIME: 10.3 S
MDC_IDC_MSMT_CAP_CHARGE_TYPE: NORMAL
MDC_IDC_MSMT_LEADCHNL_RV_IMPEDANCE_VALUE: 501 OHM
MDC_IDC_MSMT_LEADCHNL_RV_PACING_THRESHOLD_AMPLITUDE: 0.5 V
MDC_IDC_MSMT_LEADCHNL_RV_PACING_THRESHOLD_PULSEWIDTH: 0.5 MS
MDC_IDC_PG_IMPLANT_DTM: NORMAL
MDC_IDC_PG_MFG: NORMAL
MDC_IDC_PG_MODEL: NORMAL
MDC_IDC_PG_SERIAL: NORMAL
MDC_IDC_PG_TYPE: NORMAL
MDC_IDC_SESS_CLINIC_NAME: NORMAL
MDC_IDC_SESS_DTM: NORMAL
MDC_IDC_SESS_TYPE: NORMAL
MDC_IDC_SET_BRADY_LOWRATE: 40 {BEATS}/MIN
MDC_IDC_SET_BRADY_MODE: NORMAL
MDC_IDC_SET_LEADCHNL_RV_PACING_AMPLITUDE: 2.4 V
MDC_IDC_SET_LEADCHNL_RV_PACING_POLARITY: NORMAL
MDC_IDC_SET_LEADCHNL_RV_PACING_PULSEWIDTH: 0.5 MS
MDC_IDC_SET_LEADCHNL_RV_SENSING_ADAPTATION_MODE: NORMAL
MDC_IDC_SET_LEADCHNL_RV_SENSING_POLARITY: NORMAL
MDC_IDC_SET_LEADCHNL_RV_SENSING_SENSITIVITY: 0.6 MV
MDC_IDC_SET_ZONE_DETECTION_INTERVAL: 240 MS
MDC_IDC_SET_ZONE_DETECTION_INTERVAL: 300 MS
MDC_IDC_SET_ZONE_DETECTION_INTERVAL: 400 MS
MDC_IDC_SET_ZONE_TYPE: NORMAL
MDC_IDC_SET_ZONE_VENDOR_TYPE: NORMAL
MDC_IDC_STAT_BRADY_DTM_END: NORMAL
MDC_IDC_STAT_BRADY_DTM_START: NORMAL
MDC_IDC_STAT_BRADY_RV_PERCENT_PACED: 0 %
MDC_IDC_STAT_EPISODE_RECENT_COUNT: 0
MDC_IDC_STAT_EPISODE_RECENT_COUNT: 63
MDC_IDC_STAT_EPISODE_RECENT_COUNT_DTM_END: NORMAL
MDC_IDC_STAT_EPISODE_RECENT_COUNT_DTM_START: NORMAL
MDC_IDC_STAT_EPISODE_TYPE: NORMAL
MDC_IDC_STAT_EPISODE_VENDOR_TYPE: NORMAL
MDC_IDC_STAT_TACHYTHERAPY_ATP_DELIVERED_RECENT: 0
MDC_IDC_STAT_TACHYTHERAPY_ATP_DELIVERED_TOTAL: 0
MDC_IDC_STAT_TACHYTHERAPY_RECENT_DTM_END: NORMAL
MDC_IDC_STAT_TACHYTHERAPY_RECENT_DTM_START: NORMAL
MDC_IDC_STAT_TACHYTHERAPY_SHOCKS_ABORTED_RECENT: 0
MDC_IDC_STAT_TACHYTHERAPY_SHOCKS_ABORTED_TOTAL: 0
MDC_IDC_STAT_TACHYTHERAPY_SHOCKS_DELIVERED_RECENT: 0
MDC_IDC_STAT_TACHYTHERAPY_SHOCKS_DELIVERED_TOTAL: 0
MDC_IDC_STAT_TACHYTHERAPY_TOTAL_DTM_END: NORMAL
MDC_IDC_STAT_TACHYTHERAPY_TOTAL_DTM_START: NORMAL

## 2019-09-11 PROCEDURE — 93295 DEV INTERROG REMOTE 1/2/MLT: CPT | Mod: ZP | Performed by: INTERNAL MEDICINE

## 2019-09-11 PROCEDURE — 93296 REM INTERROG EVL PM/IDS: CPT | Mod: ZF

## 2019-09-12 ENCOUNTER — TELEPHONE (OUTPATIENT)
Dept: ENDOCRINOLOGY | Facility: CLINIC | Age: 64
End: 2019-09-12

## 2019-09-12 NOTE — TELEPHONE ENCOUNTER
[facility] Call Center    Phone Message    May a detailed message be left on voicemail: yes    Reason for Call: Medication Question or concern regarding medication   Prescription Clarification  Name of Medication: genuvia, 1 touch strips, lantus  Prescribing Provider: yoshi   [facility]   What on the order needs clarification? They only rcvd 1/2 of the recent fax order. Please refax the complete order, use reference # [id number]. thanks          Action Taken: Message routed to:  [facility] grayson mora

## 2019-09-13 DIAGNOSIS — Z79.4 TYPE 2 DIABETES MELLITUS WITH DIABETIC NEPHROPATHY, WITH LONG-TERM CURRENT USE OF INSULIN (H): ICD-10-CM

## 2019-09-13 DIAGNOSIS — E11.21 TYPE 2 DIABETES MELLITUS WITH DIABETIC NEPHROPATHY, WITH LONG-TERM CURRENT USE OF INSULIN (H): ICD-10-CM

## 2019-09-17 ENCOUNTER — DOCUMENTATION ONLY (OUTPATIENT)
Dept: CARDIOLOGY | Facility: CLINIC | Age: 64
End: 2019-09-17

## 2019-09-19 ENCOUNTER — TELEPHONE (OUTPATIENT)
Dept: TRANSPLANT | Facility: CLINIC | Age: 64
End: 2019-09-19

## 2019-09-19 NOTE — TELEPHONE ENCOUNTER
"Follow-up dental plan:  Lucian was previously seen by [facility]'s Dental Resident's clinic, and was told he needed lots of work done to clear him for transplant.  However, he did not make a return appointment, as he felt the clinic was \"too far away\" on the [facility].  His preference was to reach out to his local dentist, to see if he would be willing to do the dental work necessary.  At the time of his eval, the dentist was on vacation.  He was recently able to discuss his needed dental work, but according to Lucian, the dentist was not willing to do the work due to his heart condition.  Therefore, Lucian plans to go back to our Dental Resident's clinic, and thinks he has an appt. tomorrow.    He knows to call the Transplant Office once he is finished with his dental work, at which time we will plan a hospital admit for a trial of inotropes.    "

## 2019-09-23 DIAGNOSIS — H26.9 NUCLEAR CATARACT, NONSENILE: Primary | ICD-10-CM

## 2019-09-26 ENCOUNTER — ALLIED HEALTH/NURSE VISIT (OUTPATIENT)
Dept: OPHTHALMOLOGY | Facility: CLINIC | Age: 64
End: 2019-09-26
Attending: OPHTHALMOLOGY
Payer: COMMERCIAL

## 2019-09-26 DIAGNOSIS — E11.3533 BOTH EYES AFFECTED BY PROLIFERATIVE DIABETIC RETINOPATHY WITH TRACTION RETINAL DETACHMENTS NOT INVOLVING MACULAE, ASSOCIATED WITH TYPE 2 DIABETES MELLITUS (H): ICD-10-CM

## 2019-09-26 DIAGNOSIS — H25.013 CORTICAL SENILE CATARACT OF BOTH EYES: ICD-10-CM

## 2019-09-26 DIAGNOSIS — E11.3553 STABLE PROLIFERATIVE DIABETIC RETINOPATHY OF BOTH EYES ASSOCIATED WITH TYPE 2 DIABETES MELLITUS (H): Primary | ICD-10-CM

## 2019-09-26 PROCEDURE — 25000128 H RX IP 250 OP 636: Mod: ZF | Performed by: OPHTHALMOLOGY

## 2019-09-26 PROCEDURE — G0463 HOSPITAL OUTPT CLINIC VISIT: HCPCS | Mod: ZF

## 2019-09-26 PROCEDURE — 67028 INJECTION EYE DRUG: CPT | Mod: RT,ZF | Performed by: OPHTHALMOLOGY

## 2019-09-26 PROCEDURE — 76519 ECHO EXAM OF EYE: CPT | Mod: ZF | Performed by: OPHTHALMOLOGY

## 2019-09-26 PROCEDURE — C9257 BEVACIZUMAB INJECTION: HCPCS | Mod: ZF | Performed by: OPHTHALMOLOGY

## 2019-09-26 PROCEDURE — 92081 LIMITED VISUAL FIELD XM: CPT | Mod: ZF | Performed by: OPHTHALMOLOGY

## 2019-09-26 RX ADMIN — Medication 1.25 MG: at 16:03

## 2019-09-26 RX ADMIN — Medication 1.25 MG: at 16:02

## 2019-09-26 ASSESSMENT — TONOMETRY
OS_IOP_MMHG: 14
OD_IOP_MMHG: 14
IOP_METHOD: TONOPEN

## 2019-09-26 ASSESSMENT — CONF VISUAL FIELD
METHOD: COUNTING FINGERS
OD_NORMAL: 1
OS_NORMAL: 1

## 2019-09-26 ASSESSMENT — VISUAL ACUITY
OD_SC+: -2
OS_SC: 20/125
OS_PH_SC: 20/70
OS_SC+: +1
METHOD: SNELLEN - LINEAR
OD_SC: 20/40

## 2019-09-26 ASSESSMENT — EXTERNAL EXAM - RIGHT EYE: OD_EXAM: NORMAL

## 2019-09-26 ASSESSMENT — CUP TO DISC RATIO
OS_RATIO: 0.2
OD_RATIO: 0.2

## 2019-09-26 ASSESSMENT — SLIT LAMP EXAM - LIDS
COMMENTS: PTOSIS OD>OS
COMMENTS: PTOSIS OD>OS

## 2019-09-26 ASSESSMENT — EXTERNAL EXAM - LEFT EYE: OS_EXAM: NORMAL

## 2019-09-26 NOTE — PROGRESS NOTES
CC: follow up proliferative diabetic retinopathy    s/p PRP left eye 6/5/29, PRP right eye 6/12/19  Patient needs DMV form fill out today and possible avastin inj  Pre-op cataract evaluation left eye     Interval Update: reports doing better, no interval changes. No distortion in vision each eye. No flashes, floaters, shadow/curtain over vision.  Reports some changes in vision left eye     HPI: Two weeks prior to presentation to retina clinic, pt woke up and suddenly had blurred vision in both eyes. He went to the ED for possible stroke workup but was found to have count fingers visual acuity. He was evaluated by Dr. Ronak Perez and found to have bilateral vitreous hemorrhages.    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 No neovascularization of the disc. Microaneurysms   - left eye: neovascularization elsewhere, severe peripheral ischemia. (+) neovascularization of the disc. Microaneurysms     optos pic consistent with exam  optos Autofluorescence hypoautofluorescence of the hemes    Optical Coherence Tomography 8-29-19  - RE: vitreous opacities; rare cystic changes    - LE: Epiretinal membrane. Stable to improved IRF.   Prior Extramacular Optical Coherence Tomography showed tractional retinal detachment about 4DD inferior to the macula and another Tractional retinal detachment supero-temporal quadrant left eye     Octopus visual field 09/26/19   Right eye: H 140 degrees right eye ; V 105 degrees  Left eye: H  125 degrees left eye ; V 105 degrees    intraocular lens  calculations 09/26/19   AL right eye 23.97  Left eye: 24.19    Assessment and Plan  1. 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   - today stable eye exam    2. Nuclear sclerotic and cortical cataracts, both eyes  - see below    3. Epiretinal membrane left eye   See below    Plan:  Left eye: Cataract extraction intraocular lens, Pars plana vitrectomy (PPV), endolaser and membrane peel, Air fluid exchange and possible gas left eye  General anesthesia  2 hour case    I reviewed the indications, risks, benefits, and alternatives of the proposed surgical procedure including, but not limited to, failure to improve vision or further loss of vision,  and need for additional surgery, bleeding, infection, loss of vision and the remote possibility of complications of anesthesia. 1:1000 risk of infection/bleed/loss of eye; 1:100 risk of RD and need for further surgery. Patient aware of prolonged healing after retinal surgery (up to a year after surgery) as well as possibility of a gas/SO  instillation into eye. Patient agreed to proceed with surgery.  I provided multiple opportunities for the questions, answered all questions to the best of my ability, and confirmed that my answers and my discussion were understood.      return to clinic: 1 month for avastin inj both eyes -   ~~~~~~~~~~~~~~~~~~~~~~~~~~~~~~~~~~   Complete documentation of historical and exam elements from today's encounter can be found in the full encounter summary report (not reduplicated in this progress note).  I personally obtained the chief complaint(s) and history of present illness.  I confirmed  and edited as necessary the review of systems, past medical/surgical history, family history, social history, and examination findings as documented by others; and I examined the patient myself.  I personally reviewed the relevant tests, images, and reports as documented above.  I personally reviewed the ophthalmic test(s) associated with this encounter, agree with the interpretation(s) as documented by the resident/fellow, and have edited the corresponding report(s) as necessary.   I formulated and edited as necessary the assessment and plan and discussed the findings and management plan with the patient and family and No resident or fellow assisted with the procedures performed.  I performed the procedures myself.    Triny Bunch MD   of Ophthalmology.  Retina Service   Department of Ophthalmology and Visual Neurosciences   [facility]  Phone: [phone]   Fax: [phone]

## 2019-09-28 ENCOUNTER — HEALTH MAINTENANCE LETTER (OUTPATIENT)
Age: 64
End: 2019-09-28

## 2019-09-30 ENCOUNTER — TELEPHONE (OUTPATIENT)
Dept: OPHTHALMOLOGY | Facility: CLINIC | Age: 64
End: 2019-09-30

## 2019-09-30 NOTE — TELEPHONE ENCOUNTER
[facility] Call Center    Phone Message    May a detailed message be left on voicemail: yes    Reason for Call: Form or Letter   Type or form/letter needing completion: Daughter requesting letter stating when patient visits her in [place] (her home) she needs to be his caregiver.  This is for her landlorsidra.   Provider: Dr Bunch  Date form needed: asap  Once completed: Fax form to: (tereza fax [phone]) tereza Fernandez      Action Taken: Message routed to:  [facility] Eye

## 2019-09-30 NOTE — LETTER
October 17, 2019      Re: Lucian VILA Tee Henderson Sr.   1955    To Whom It May Concern:    This is to confirm that the above patient was seen on 9/26/2019.  Lucian VILA Tee Henderson Sr. is in need of a caregiver while he is in [place], [place]. His caregiver will be Jenny Thomas.    Thank you for your cooperation in this matter.  Please do not hesitate to contact me if you have any further questions.    Sincerely,      Electronically signed  JENNY MAURICIO

## 2019-10-09 ENCOUNTER — DOCUMENTATION ONLY (OUTPATIENT)
Dept: CARE COORDINATION | Facility: CLINIC | Age: 64
End: 2019-10-09

## 2019-10-27 ENCOUNTER — HEALTH MAINTENANCE LETTER (OUTPATIENT)
Age: 64
End: 2019-10-27

## 2019-11-21 DIAGNOSIS — I50.22 CHRONIC SYSTOLIC HEART FAILURE (H): Primary | Chronic | ICD-10-CM

## 2019-11-25 ENCOUNTER — ANCILLARY PROCEDURE (OUTPATIENT)
Dept: CARDIOLOGY | Facility: CLINIC | Age: 64
End: 2019-11-25
Attending: INTERNAL MEDICINE
Payer: COMMERCIAL

## 2019-11-25 ENCOUNTER — OFFICE VISIT (OUTPATIENT)
Dept: ENDOCRINOLOGY | Facility: CLINIC | Age: 64
End: 2019-11-25
Payer: COMMERCIAL

## 2019-11-25 VITALS
BODY MASS INDEX: 34.16 KG/M2 | HEART RATE: 101 BPM | OXYGEN SATURATION: 97 % | DIASTOLIC BLOOD PRESSURE: 78 MMHG | SYSTOLIC BLOOD PRESSURE: 116 MMHG | WEIGHT: 205 LBS | HEIGHT: 65 IN

## 2019-11-25 VITALS
WEIGHT: 204.5 LBS | BODY MASS INDEX: 34.03 KG/M2 | DIASTOLIC BLOOD PRESSURE: 76 MMHG | HEART RATE: 103 BPM | SYSTOLIC BLOOD PRESSURE: 114 MMHG

## 2019-11-25 DIAGNOSIS — E11.21 TYPE 2 DIABETES MELLITUS WITH DIABETIC NEPHROPATHY, WITH LONG-TERM CURRENT USE OF INSULIN (H): Primary | Chronic | ICD-10-CM

## 2019-11-25 DIAGNOSIS — E11.65 TYPE 2 DIABETES MELLITUS WITH HYPERGLYCEMIA, WITH LONG-TERM CURRENT USE OF INSULIN (H): ICD-10-CM

## 2019-11-25 DIAGNOSIS — N18.30 TYPE 2 DIABETES MELLITUS WITH STAGE 3 CHRONIC KIDNEY DISEASE, WITH LONG-TERM CURRENT USE OF INSULIN (H): ICD-10-CM

## 2019-11-25 DIAGNOSIS — Z79.4 TYPE 2 DIABETES MELLITUS WITH HYPERGLYCEMIA, WITH LONG-TERM CURRENT USE OF INSULIN (H): ICD-10-CM

## 2019-11-25 DIAGNOSIS — E11.22 TYPE 2 DIABETES MELLITUS WITH STAGE 3 CHRONIC KIDNEY DISEASE, WITH LONG-TERM CURRENT USE OF INSULIN (H): ICD-10-CM

## 2019-11-25 DIAGNOSIS — E11.3513 TYPE 2 DIABETES MELLITUS WITH PROLIFERATIVE RETINOPATHY OF BOTH EYES AND MACULAR EDEMA, UNSPECIFIED WHETHER LONG TERM INSULIN USE (H): Primary | ICD-10-CM

## 2019-11-25 DIAGNOSIS — Z95.810 AUTOMATIC IMPLANTABLE CARDIOVERTER-DEFIBRILLATOR IN SITU: Primary | Chronic | ICD-10-CM

## 2019-11-25 DIAGNOSIS — I42.9 CARDIOMYOPATHY (H): ICD-10-CM

## 2019-11-25 DIAGNOSIS — I50.22 CHRONIC SYSTOLIC HEART FAILURE (H): Chronic | ICD-10-CM

## 2019-11-25 DIAGNOSIS — Z79.4 TYPE 2 DIABETES MELLITUS WITH STAGE 3 CHRONIC KIDNEY DISEASE, WITH LONG-TERM CURRENT USE OF INSULIN (H): ICD-10-CM

## 2019-11-25 DIAGNOSIS — I50.22 CHRONIC SYSTOLIC HEART FAILURE (H): ICD-10-CM

## 2019-11-25 DIAGNOSIS — Z79.4 TYPE 2 DIABETES MELLITUS WITH DIABETIC NEPHROPATHY, WITH LONG-TERM CURRENT USE OF INSULIN (H): Primary | Chronic | ICD-10-CM

## 2019-11-25 LAB
ANION GAP SERPL CALCULATED.3IONS-SCNC: 4 MMOL/L (ref 3–14)
BUN SERPL-MCNC: 31 MG/DL (ref 7–30)
CALCIUM SERPL-MCNC: 9 MG/DL (ref 8.5–10.1)
CHLORIDE SERPL-SCNC: 105 MMOL/L (ref 94–109)
CO2 SERPL-SCNC: 27 MMOL/L (ref 20–32)
CREAT SERPL-MCNC: 2.12 MG/DL (ref 0.66–1.25)
ERYTHROCYTE [DISTWIDTH] IN BLOOD BY AUTOMATED COUNT: 15.9 % (ref 10–15)
GFR SERPL CREATININE-BSD FRML MDRD: 31 ML/MIN/{1.73_M2}
GLUCOSE SERPL-MCNC: 141 MG/DL (ref 70–99)
HCT VFR BLD AUTO: 42.1 % (ref 40–53)
HGB BLD-MCNC: 13.4 G/DL (ref 13.3–17.7)
MCH RBC QN AUTO: 29.6 PG (ref 26.5–33)
MCHC RBC AUTO-ENTMCNC: 31.8 G/DL (ref 31.5–36.5)
MCV RBC AUTO: 93 FL (ref 78–100)
NT-PROBNP SERPL-MCNC: 6187 PG/ML (ref 0–125)
PLATELET # BLD AUTO: 206 10E9/L (ref 150–450)
POTASSIUM SERPL-SCNC: 4.8 MMOL/L (ref 3.4–5.3)
RBC # BLD AUTO: 4.53 10E12/L (ref 4.4–5.9)
SODIUM SERPL-SCNC: 136 MMOL/L (ref 133–144)
WBC # BLD AUTO: 6.5 10E9/L (ref 4–11)

## 2019-11-25 PROCEDURE — 85027 COMPLETE CBC AUTOMATED: CPT | Performed by: INTERNAL MEDICINE

## 2019-11-25 PROCEDURE — 80048 BASIC METABOLIC PNL TOTAL CA: CPT | Performed by: INTERNAL MEDICINE

## 2019-11-25 PROCEDURE — G0463 HOSPITAL OUTPT CLINIC VISIT: HCPCS | Mod: 25,ZF

## 2019-11-25 PROCEDURE — T1013 SIGN LANG/ORAL INTERPRETER: HCPCS | Mod: U3,ZF

## 2019-11-25 PROCEDURE — 83880 ASSAY OF NATRIURETIC PEPTIDE: CPT | Performed by: INTERNAL MEDICINE

## 2019-11-25 PROCEDURE — 36415 COLL VENOUS BLD VENIPUNCTURE: CPT | Performed by: INTERNAL MEDICINE

## 2019-11-25 PROCEDURE — T1013 SIGN LANG/ORAL INTERPRETER: HCPCS | Mod: U3

## 2019-11-25 PROCEDURE — 99215 OFFICE O/P EST HI 40 MIN: CPT | Mod: ZP | Performed by: INTERNAL MEDICINE

## 2019-11-25 RX ORDER — GLIMEPIRIDE 4 MG/1
4 TABLET ORAL
Qty: 90 TABLET | Refills: 3 | Status: ON HOLD | OUTPATIENT
Start: 2019-11-25 | End: 2020-08-03

## 2019-11-25 RX ORDER — LIDOCAINE 40 MG/G
CREAM TOPICAL
Status: CANCELLED | OUTPATIENT
Start: 2019-11-25

## 2019-11-25 RX ORDER — FUROSEMIDE 20 MG
40 TABLET ORAL 2 TIMES DAILY
Qty: 180 TABLET | Refills: 3 | Status: ON HOLD | OUTPATIENT
Start: 2019-11-25 | End: 2020-08-03

## 2019-11-25 ASSESSMENT — MIFFLIN-ST. JEOR: SCORE: 1636.75

## 2019-11-25 ASSESSMENT — PAIN SCALES - GENERAL
PAINLEVEL: NO PAIN (0)
PAINLEVEL: NO PAIN (0)

## 2019-11-25 NOTE — PATIENT INSTRUCTIONS
You were seen today in the Cardiovascular Clinic at the [facility].       Cardiology Providers you saw during your visit: Dr. Sheridan      Medication Changes:   1. STOP coreg (carvedilol).  2. INCREASE your lasix (furosemide) to 40mg twice per day.    Follow up Appointment Information:  1. We will schedule you for cardiopulmonary stress test and right heart catheterization.    uesday December 10th:   10:30am - cardiopulmonary stress test  CardioPulmonary Stress Test (CPX)  CPX is a maximal (meaning you exercise to exhaustion, not to achieve a heart rate) exercise test where we measure how well your heart/body uses oxygen or energy. It is the gold standard for measuring functional capacity and helps us differentiate limitations due to lungs, heart, or fitness.   A CPX is NOT a typical stress test. You will NOT be asked to hold your Beta Blocker medication.   You will be scheduled for a CardioPulmonary Stress Test at the [facility] ([address], [facility] [address], [phone]).  Follow these instructions:  1. Report to the GOLD waiting room in the main hospital  2. Nothing to eat for 3 hours prior to your test. You may have clear liquids up to the time of your test  3. Please wear loose, two-piece clothing and comfortable, rubber soled shoes for walking   For Patients with Diabetes: Your RN Coordinator will give you instructions on adjusting your diabetic medications for the day of your test                    If you have questions please contact your diabetic care team                    Remember to  bring your glucometer & insulin with you to take after your test if needed      Wednesday December 11th:  Check in at 12:30pm for right heart cath    A Right Heart Cath is a test that measures how well your heart is pumping blood to your body and will assess the volume and pressures in your heart.   You are scheduled for a Right Heart Catheterization at the [facility] [address]. You are to check in at the Gold Waiting Area.   When you arrive you will be escorted back to the pre procedure area called 2A. Here they will obtain a short medical history. Please bring an updated list of your current medications.  A physician will come and talk with you about the procedure and obtain consent.  A nurse from the Cardiac Catheterization Lab will then escort you to the procedure area. You will receive a shot to numb the site where the catheter (tube) will enter your body.  This may be in the neck or groin - but likely your neck.  You will not receive sedation or anesthesia.   After the procedure you will recover for a short period and then be discharged.  Pre procedure instructions:   1.  Do not eat any solid food or milk products for 6 hours prior to the exam. You may drink clear liquids until 2 hours prior to the exam. Clear liquids include the following: water, Jell-O, clear broth, apple juice or any non-carbonated drink that you can see through (no pop!).   2. Do not drink alcohol or smoke 24 hours prior to test.  3. Hold these meds:  Do not take your oral diabetic medication or short acting insulin the day of the procedure.    Hold your warfarin, pradaxa/xarelto/eliquis 2 days prior.   4. You may take your other morning medications as prescribed with a sip of water. You may hold your diuretic that morning.        Results:    Results for BUCK CABAN SR. (MRN [id number]) as of 11/25/2019 13:53   Ref. Range 11/25/2019 10:35   Sodium Latest Ref Range: 133 - 144 mmol/L 136   Potassium Latest Ref Range: 3.4 - 5.3 mmol/L 4.8   Chloride Latest Ref Range: 94 - 109 mmol/L 105   Carbon Dioxide Latest Ref Range: 20 - 32 mmol/L 27   Urea Nitrogen Latest Ref Range: 7 - 30 mg/dL 31 (H)   Creatinine Latest Ref Range: 0.66 - 1.25 mg/dL 2.12 (H)   GFR Estimate Latest Ref Range: >60 mL/min/1.73_m2 31 (L)   GFR Estimate If  "Black Latest Ref Range: >60 mL/min/1.73_m2 36 (L)   Calcium Latest Ref Range: 8.5 - 10.1 mg/dL 9.0   Anion Gap Latest Ref Range: 3 - 14 mmol/L 4   N-Terminal Pro Bnp Latest Ref Range: 0 - 125 pg/mL 6,187 (H)   Glucose Latest Ref Range: 70 - 99 mg/dL 141 (H)   WBC Latest Ref Range: 4.0 - 11.0 10e9/L 6.5   Hemoglobin Latest Ref Range: 13.3 - 17.7 g/dL 13.4   Hematocrit Latest Ref Range: 40.0 - 53.0 % 42.1   Platelet Count Latest Ref Range: 150 - 450 10e9/L 206   RBC Count Latest Ref Range: 4.4 - 5.9 10e12/L 4.53   MCV Latest Ref Range: 78 - 100 fl 93   MCH Latest Ref Range: 26.5 - 33.0 pg 29.6   MCHC Latest Ref Range: 31.5 - 36.5 g/dL 31.8   RDW Latest Ref Range: 10.0 - 15.0 % 15.9 (H)         [address] [place] [address] on 3rd Floor   [place] [phone]        Thank you for allowing us to be a part of your care here at the [facility]      If you have questions or concerns please contact us at:      Meenu eD RN BSN   Cardiology Care Coordinator  [facility]   Questions and [phone]   First press #1 for the [employer] and then press #4 to \"send a message to your care team\"     "

## 2019-11-25 NOTE — PROGRESS NOTES
Service Date: 2019      Suyapa Hatfield MD   [address] [place] [address]      RE: Lucian Henderson   MRN: [id number]   : 1955      Dear Dr. Hatfield:      We had the pleasure of seeing Mr. Tee Henderson for followup in our Advanced Heart Failure Clinic.  As you know, he is a 64-year-old gentleman with ischemic cardiomyopathy and severe LV systolic dysfunction who is currently on medical therapy.      Mr. Tee Henderson returns today for his routine followup.  We recommended him to undergo an evaluation for LVAD and transplant.  He has completed most of the workup except for dental.  He has been ambivalent about proceeding to advanced therapies.  In light of this, he did not complete his dental workup.      He was last seen in clinic in August.  A repeat heart catheterization at that time showed worsening left-sided filling pressure with moderately reduced cardiac index.  In light of this, we decreased his beta blocker to 6.25 mg twice daily and increased his Lasix dose.      Today, he states that he is about the same.  He has not noticed any worsening exertional shortness of breath.  He has functional class III symptoms.  No symptoms at rest or with minimal exertion; however, anything more than usual, he gets winded.  He has lower extremity swelling on and off.  No lightheadedness, dizziness or syncope.  No ICD shocks.  No exertional chest pain or chest pressure.  No recent hospitalization or ER visit.      He has been compliant in taking his medications regularly.      PAST MEDICAL HISTORY:   1.  Hypertension.   2.  Hyperlipidemia.   3.  Diabetes.   4.  Obesity.   5.  Anterior wall MI.   6.  Ischemic cardiomyopathy with an estimated ejection fraction of 10%-20%.   7.  Chronic kidney disease.   8.  Obstructive sleep apnea.      MEDICATIONS:    Current Outpatient Medications   Medication Sig     aspirin 81 MG tablet Take 1 tablet (81 mg) by mouth daily      atorvastatin (LIPITOR) 40 MG tablet Take 1 tablet (40 mg) by mouth daily     blood glucose (ACCU-CHEK SMARTVIEW) test strip USE TO TEST THREE TIMES DAILY AS DIRECTED     blood glucose (NO BRAND SPECIFIED) test strip Use to test blood sugar 2 times daily or as directed.     blood glucose monitoring (ACCU-CHEK FELIPE SMARTVIEW) meter device kit Use to test blood sugar 3-4 times daily, as directed.     blood glucose monitoring (ONE TOUCH DELICA) lancets Use to test blood sugars 2 times daily.     clopidogrel (PLAVIX) 75 MG tablet Take 1 tablet (75 mg) by mouth daily     exenatide ER (BYDUREON) 2 MG pen Inject 2 mg Subcutaneous every 7 days     furosemide (LASIX) 20 MG tablet Take 2 tablets (40 mg) by mouth 2 times daily     glimepiride (AMARYL) 4 MG tablet Take 1 tablet (4 mg) by mouth every morning (before breakfast)     hydrALAZINE (APRESOLINE) 25 MG tablet Take 1 tablet (25 mg) by mouth 3 times daily     insulin glargine (LANTUS SOLOSTAR) 100 UNIT/ML pen Take 8 units in the morning and 40 units in the evening     insulin pen needle (B-D U/F) 31G X 5 MM miscellaneous 1 Units by Device route 2 times daily Use 2 pen needles daily or as directed.     isosorbide mononitrate (IMDUR) 60 MG 24 hr tablet Take 1 tablet (60 mg) by mouth daily     losartan (COZAAR) 50 MG tablet Take 1 tablet (50 mg) by mouth daily     neomycin-polymyxin-dexamethasone (MAXITROL) 3.5-61915-9.1 ophthalmic ointment Place 0.5 inches into both eyes At Bedtime     nitroglycerin (NITROSTAT) 0.4 MG SL tablet Place 1 tablet (0.4 mg) under the tongue every 5 minutes as needed for chest pain If you are still having symptoms after 3 doses (15 minutes) call 911.     order for DME Auto CPAP 8-15 cmH20     Current Facility-Administered Medications   Medication     bevacizumab (AVASTIN) intravitreal inj 1.25 mg     bevacizumab (AVASTIN) intravitreal inj 1.25 mg     erythromycin (ROMYCIN) ophthalmic ointment     lidocaine (PF) (XYLOCAINE) 2 % injection 10 mL      Facility-Administered Medications Ordered in Other Visits   Medication     sodium chloride (PF) 0.9% PF flush 10 mL       REVIEW OF SYSTEMS:  A detailed 10-point review of systems was obtained as described in the History of Present Illness.  All other systems reviewed and are negative.      PHYSICAL EXAMINATION:  He was comfortable.  He was in no apparent distress.  His blood pressure was 116/78.  His pulse rate was 101.  His respiratory rate was 18.  He was saturating 97% on room air.  His weight was 205 pounds.  He was 5 feet 5 inches tall.  His BMI was 34.11.  His JVD was visible at 8 cm above manubrium sternal angle.  His carotids were 1+ bilaterally.  His extremities were relatively cooler.  He had no pallor, cyanosis or jaundice.  Cardiac auscultation revealed sinus tachycardia, normal S1 and S2.  No murmur, rub or gallop.  Auscultation of the lungs revealed equal air entry on both sides with no added sounds.  His abdomen was soft with normal bowel sounds, no tenderness, no rigidity, no guarding.  He had no focal neurological deficit.  Extremities showed 2+ edema bilaterally all the way up to the knee.      His NT-proBNP is significantly increased at 6187. His renal function is stable with a BUN of 31 and creatinine of 2.12.  His electrolytes and CBC were unremarkable.     His ICD showed 1 episode of VT with no therapy, 11 nonsustained VTs.      ASSESSMENT AND PLAN:      Mr. Lucian Henderson is a 64-year-old gentleman with ischemic cardiomyopathy and severe LV systolic dysfunction who is currently being evaluated for LVAD and transplant.  He returns today for followup.      Overall, his clinical trajectory is very concerning.  Although he states that he feels about the same, there are several objective signs of which are indicating end-stage heart failure.  His cardiopulmonary exercise testing from last year showed a VO2 of 10.2.  His VE/VCO2 slope was high normal.  His systolic blood pressure dropped  during exercise.  Subsequent right heart catheterizations have shown moderately reduced cardiac index with elevated left-sided filling pressure.  His renal function has been elevated for many years, and this has been gradually, slowly getting worse.  All these things are suggestive of ambulatory cardiogenic shock.  Now despite reducing his beta blocker dose, he is having worsening lower extremity edema.  His NT-proBNP is increasing.  He is tachycardic at rest.      I discussed with Mr. Tee Henderson and his wife that I am overall concerned about his trajectory.  I encouraged them to strongly consider advanced heart failure therapies, including LVAD or transplant.  His candidacy for cardiac transplantation is unclear.  The main prohibitive factor for cardiac transplantation would be his renal function.  If he were to be willing, we will admit him to the hospital and start him on an inotrope to see how much of an improvement in his creatinine he has.  If not, then he would likely be a candidate for destination LVAD therapy.  In general, we do not consider patients for heart-kidney transplantation after the age of 65, unless they are really healthy and especially with a living related donor.  He is still ambivalent about proceeding with the advanced therapies.  He wants to talk to his kids.  I have encouraged him to make a decision as soon as possible.      In the interim, I have recommended to stop the beta blockers in view of his worsening heart failure.  We will increase his Lasix to 40 mg twice daily.  We will schedule him for a repeat cardiopulmonary exercise testing and right heart catheterization as soon as possible, in the next week or so.  If this shows worsening end-stage heart failure or ambulatory cardiogenic shock, we will recommend him to be admitted to the hospital for initiation of IV inotrope, completion of the advanced heart failure options and make a decision on the candidacy and treatment option.       I explained all these things through an .  We will keep you apprised of his treatment plan and his decisions.      It was a pleasure meeting Mr. Tee Henderson in our Advanced Heart Failure Clinic at the [facility].  We thank you for involving us in his care.  Please do not hesitate to call us in the interim if you have any further questions.       Sincerely,   Arvin Sheridan MD   [facility]  Heart Failure, Transplant, and Mechanical Circulatory Support Cardiology   Cardiovascular Division  [facility]   [phone]                D: 2019   T: 2019   MT: al      Name:     BUCK CABAN   MRN:      [id number]        Account:      [id number]   :      1955           Service Date: 2019      Document: [id number]

## 2019-11-25 NOTE — LETTER
2019      RE: Lucian Henderson Sr.  [address] [facility] [address]       Dear Colleague,    Thank you for the opportunity to participate in the care of your patient, Lucian Henderson Sr., at the [facility]. Please see a copy of my visit note below.    Service Date: 2019      Suyapa Hatfield MD   [facility]   [address] [place] [address]      RE: Lucian Henderson   MRN: [id number]   : 1955      Dear Dr. Hatfield:      We had the pleasure of seeing Mr. Tee Henderson for followup in our Advanced Heart Failure Clinic.  As you know, he is a 64-year-old gentleman with ischemic cardiomyopathy and severe LV systolic dysfunction who is currently on medical therapy.      Mr. Tee Henderson returns today for his routine followup.  We recommended him to undergo an evaluation for LVAD and transplant.  He has completed most of the workup except for dental.  He has been ambivalent about proceeding to advanced therapies.  In light of this, he did not complete his dental workup.      He was last seen in clinic in August.  A repeat heart catheterization at that time showed worsening left-sided filling pressure with moderately reduced cardiac index.  In light of this, we decreased his beta blocker to 6.25 mg twice daily and increased his Lasix dose.      Today, he states that he is about the same.  He has not noticed any worsening exertional shortness of breath.  He has functional class III symptoms.  No symptoms at rest or with minimal exertion; however, anything more than usual, he gets winded.  He has lower extremity swelling on and off.  No lightheadedness, dizziness or syncope.  No ICD shocks.  No exertional chest pain or chest pressure.  No recent hospitalization or ER visit.      He has been compliant in taking his medications regularly.      PAST MEDICAL HISTORY:   1.  Hypertension.   2.   Hyperlipidemia.   3.  Diabetes.   4.  Obesity.   5.  Anterior wall MI.   6.  Ischemic cardiomyopathy with an estimated ejection fraction of 10%-20%.   7.  Chronic kidney disease.   8.  Obstructive sleep apnea.      MEDICATIONS:    Current Outpatient Medications   Medication Sig     aspirin 81 MG tablet Take 1 tablet (81 mg) by mouth daily     atorvastatin (LIPITOR) 40 MG tablet Take 1 tablet (40 mg) by mouth daily     blood glucose (ACCU-CHEK SMARTVIEW) test strip USE TO TEST THREE TIMES DAILY AS DIRECTED     blood glucose (NO BRAND SPECIFIED) test strip Use to test blood sugar 2 times daily or as directed.     blood glucose monitoring (ACCU-CHEK FELIPE SMARTVIEW) meter device kit Use to test blood sugar 3-4 times daily, as directed.     blood glucose monitoring (ONE TOUCH DELICA) lancets Use to test blood sugars 2 times daily.     clopidogrel (PLAVIX) 75 MG tablet Take 1 tablet (75 mg) by mouth daily     exenatide ER (BYDUREON) 2 MG pen Inject 2 mg Subcutaneous every 7 days     furosemide (LASIX) 20 MG tablet Take 2 tablets (40 mg) by mouth 2 times daily     glimepiride (AMARYL) 4 MG tablet Take 1 tablet (4 mg) by mouth every morning (before breakfast)     hydrALAZINE (APRESOLINE) 25 MG tablet Take 1 tablet (25 mg) by mouth 3 times daily     insulin glargine (LANTUS SOLOSTAR) 100 UNIT/ML pen Take 8 units in the morning and 40 units in the evening     insulin pen needle (B-D U/F) 31G X 5 MM miscellaneous 1 Units by Device route 2 times daily Use 2 pen needles daily or as directed.     isosorbide mononitrate (IMDUR) 60 MG 24 hr tablet Take 1 tablet (60 mg) by mouth daily     losartan (COZAAR) 50 MG tablet Take 1 tablet (50 mg) by mouth daily     neomycin-polymyxin-dexamethasone (MAXITROL) 3.5-53374-9.1 ophthalmic ointment Place 0.5 inches into both eyes At Bedtime     nitroglycerin (NITROSTAT) 0.4 MG SL tablet Place 1 tablet (0.4 mg) under the tongue every 5 minutes as needed for chest pain If you are still having  symptoms after 3 doses (15 minutes) call 911.     order for DME Auto CPAP 8-15 cmH20     Current Facility-Administered Medications   Medication     bevacizumab (AVASTIN) intravitreal inj 1.25 mg     bevacizumab (AVASTIN) intravitreal inj 1.25 mg     erythromycin (ROMYCIN) ophthalmic ointment     lidocaine (PF) (XYLOCAINE) 2 % injection 10 mL     Facility-Administered Medications Ordered in Other Visits   Medication     sodium chloride (PF) 0.9% PF flush 10 mL       REVIEW OF SYSTEMS:  A detailed 10-point review of systems was obtained as described in the History of Present Illness.  All other systems reviewed and are negative.      PHYSICAL EXAMINATION:  He was comfortable.  He was in no apparent distress.  His blood pressure was 116/78.  His pulse rate was 101.  His respiratory rate was 18.  He was saturating 97% on room air.  His weight was 205 pounds.  He was 5 feet 5 inches tall.  His BMI was 34.11.  His JVD was visible at 8 cm above manubrium sternal angle.  His carotids were 1+ bilaterally.  His extremities were relatively cooler.  He had no pallor, cyanosis or jaundice.  Cardiac auscultation revealed sinus tachycardia, normal S1 and S2.  No murmur, rub or gallop.  Auscultation of the lungs revealed equal air entry on both sides with no added sounds.  His abdomen was soft with normal bowel sounds, no tenderness, no rigidity, no guarding.  He had no focal neurological deficit.  Extremities showed 2+ edema bilaterally all the way up to the knee.      His NT-proBNP is significantly increased at 6187. His renal function is stable with a BUN of 31 and creatinine of 2.12.  His electrolytes and CBC were unremarkable.     His ICD showed 1 episode of VT with no therapy, 11 nonsustained VTs.      ASSESSMENT AND PLAN:      Mr. Lucian Henderson is a 64-year-old gentleman with ischemic cardiomyopathy and severe LV systolic dysfunction who is currently being evaluated for LVAD and transplant.  He returns today for  followup.      Overall, his clinical trajectory is very concerning.  Although he states that he feels about the same, there are several objective signs of which are indicating end-stage heart failure.  His cardiopulmonary exercise testing from last year showed a VO2 of 10.2.  His VE/VCO2 slope was high normal.  His systolic blood pressure dropped during exercise.  Subsequent right heart catheterizations have shown moderately reduced cardiac index with elevated left-sided filling pressure.  His renal function has been elevated for many years, and this has been gradually, slowly getting worse.  All these things are suggestive of ambulatory cardiogenic shock.  Now despite reducing his beta blocker dose, he is having worsening lower extremity edema.  His NT-proBNP is increasing.  He is tachycardic at rest.      I discussed with Mr. Tee Henderson and his wife that I am overall concerned about his trajectory.  I encouraged them to strongly consider advanced heart failure therapies, including LVAD or transplant.  His candidacy for cardiac transplantation is unclear.  The main prohibitive factor for cardiac transplantation would be his renal function.  If he were to be willing, we will admit him to the hospital and start him on an inotrope to see how much of an improvement in his creatinine he has.  If not, then he would likely be a candidate for destination LVAD therapy.  In general, we do not consider patients for heart-kidney transplantation after the age of 65, unless they are really healthy and especially with a living related donor.  He is still ambivalent about proceeding with the advanced therapies.  He wants to talk to his kids.  I have encouraged him to make a decision as soon as possible.      In the interim, I have recommended to stop the beta blockers in view of his worsening heart failure.  We will increase his Lasix to 40 mg twice daily.  We will schedule him for a repeat cardiopulmonary exercise testing and  right heart catheterization as soon as possible, in the next week or so.  If this shows worsening end-stage heart failure or ambulatory cardiogenic shock, we will recommend him to be admitted to the hospital for initiation of IV inotrope, completion of the advanced heart failure options and make a decision on the candidacy and treatment option.      I explained all these things through an .  We will keep you apprised of his treatment plan and his decisions.      It was a pleasure meeting Mr. Tee Henderson in our Advanced Heart Failure Clinic at the [facility].  We thank you for involving us in his care.  Please do not hesitate to call us in the interim if you have any further questions.       Sincerely,   Arvin Sheridan MD   [facility]  Heart Failure, Transplant, and Mechanical Circulatory Support Cardiology   Cardiovascular Division  [facility]   [phone]        D: 2019   T: 2019   MT: al      Name:     BUCK CABAN   MRN:      [id number]        Account:      [id number]   :      1955           Service Date: 2019      Document: [id number]

## 2019-11-25 NOTE — LETTER
2019      RE: Lucian Henderson Sr.  [address] [facility] [address]       Service Date: 2019      Suyapa Hatfield MD   [id number] [place] [address]      RE: Lucian Henderson   MRN: [id number]   : 1955      Dear Dr. Hatfield:      We had the pleasure of seeing Mr. Tee Henderson for followup in our Advanced Heart Failure Clinic.  As you know, he is a 64-year-old gentleman with ischemic cardiomyopathy and severe LV systolic dysfunction who is currently on medical therapy.      Mr. Tee Henderson returns today for his routine followup.  We recommended him to undergo an evaluation for LVAD and transplant.  He has completed most of the workup except for dental.  He has been ambivalent about proceeding to advanced therapies.  In light of this, he did not complete his dental workup.      He was last seen in clinic in August.  A repeat heart catheterization at that time showed worsening left-sided filling pressure with moderately reduced cardiac index.  In light of this, we decreased his beta blocker to 6.25 mg twice daily and increased his Lasix dose.      Today, he states that he is about the same.  He has not noticed any worsening exertional shortness of breath.  He has functional class III symptoms.  No symptoms at rest or with minimal exertion; however, anything more than usual, he gets winded.  He has lower extremity swelling on and off.  No lightheadedness, dizziness or syncope.  No ICD shocks.  No exertional chest pain or chest pressure.  No recent hospitalization or ER visit.      He has been compliant in taking his medications regularly.      PAST MEDICAL HISTORY:   1.  Hypertension.   2.  Hyperlipidemia.   3.  Diabetes.   4.  Obesity.   5.  Anterior wall MI.   6.  Ischemic cardiomyopathy with an estimated ejection fraction of 10%-20%.   7.  Chronic kidney disease.   8.  Obstructive sleep apnea.      MEDICATIONS:    Current Outpatient  Medications   Medication Sig     aspirin 81 MG tablet Take 1 tablet (81 mg) by mouth daily     atorvastatin (LIPITOR) 40 MG tablet Take 1 tablet (40 mg) by mouth daily     blood glucose (ACCU-CHEK SMARTVIEW) test strip USE TO TEST THREE TIMES DAILY AS DIRECTED     blood glucose (NO BRAND SPECIFIED) test strip Use to test blood sugar 2 times daily or as directed.     blood glucose monitoring (ACCU-CHEK FELIPE SMARTVIEW) meter device kit Use to test blood sugar 3-4 times daily, as directed.     blood glucose monitoring (ONE TOUCH DELICA) lancets Use to test blood sugars 2 times daily.     clopidogrel (PLAVIX) 75 MG tablet Take 1 tablet (75 mg) by mouth daily     exenatide ER (BYDUREON) 2 MG pen Inject 2 mg Subcutaneous every 7 days     furosemide (LASIX) 20 MG tablet Take 2 tablets (40 mg) by mouth 2 times daily     glimepiride (AMARYL) 4 MG tablet Take 1 tablet (4 mg) by mouth every morning (before breakfast)     hydrALAZINE (APRESOLINE) 25 MG tablet Take 1 tablet (25 mg) by mouth 3 times daily     insulin glargine (LANTUS SOLOSTAR) 100 UNIT/ML pen Take 8 units in the morning and 40 units in the evening     insulin pen needle (B-D U/F) 31G X 5 MM miscellaneous 1 Units by Device route 2 times daily Use 2 pen needles daily or as directed.     isosorbide mononitrate (IMDUR) 60 MG 24 hr tablet Take 1 tablet (60 mg) by mouth daily     losartan (COZAAR) 50 MG tablet Take 1 tablet (50 mg) by mouth daily     neomycin-polymyxin-dexamethasone (MAXITROL) 3.5-56672-2.1 ophthalmic ointment Place 0.5 inches into both eyes At Bedtime     nitroglycerin (NITROSTAT) 0.4 MG SL tablet Place 1 tablet (0.4 mg) under the tongue every 5 minutes as needed for chest pain If you are still having symptoms after 3 doses (15 minutes) call 911.     order for DME Auto CPAP 8-15 cmH20     Current Facility-Administered Medications   Medication     bevacizumab (AVASTIN) intravitreal inj 1.25 mg     bevacizumab (AVASTIN) intravitreal inj 1.25 mg      erythromycin (ROMYCIN) ophthalmic ointment     lidocaine (PF) (XYLOCAINE) 2 % injection 10 mL     Facility-Administered Medications Ordered in Other Visits   Medication     sodium chloride (PF) 0.9% PF flush 10 mL       REVIEW OF SYSTEMS:  A detailed 10-point review of systems was obtained as described in the History of Present Illness.  All other systems reviewed and are negative.      PHYSICAL EXAMINATION:  He was comfortable.  He was in no apparent distress.  His blood pressure was 116/78.  His pulse rate was 101.  His respiratory rate was 18.  He was saturating 97% on room air.  His weight was 205 pounds.  He was 5 feet 5 inches tall.  His BMI was 34.11.  His JVD was visible at 8 cm above manubrium sternal angle.  His carotids were 1+ bilaterally.  His extremities were relatively cooler.  He had no pallor, cyanosis or jaundice.  Cardiac auscultation revealed sinus tachycardia, normal S1 and S2.  No murmur, rub or gallop.  Auscultation of the lungs revealed equal air entry on both sides with no added sounds.  His abdomen was soft with normal bowel sounds, no tenderness, no rigidity, no guarding.  He had no focal neurological deficit.  Extremities showed 2+ edema bilaterally all the way up to the knee.      His NT-proBNP is significantly increased at 6187. His renal function is stable with a BUN of 31 and creatinine of 2.12.  His electrolytes and CBC were unremarkable.     His ICD showed 1 episode of VT with no therapy, 11 nonsustained VTs.      ASSESSMENT AND PLAN:      Mr. Lucian Henderson is a 64-year-old gentleman with ischemic cardiomyopathy and severe LV systolic dysfunction who is currently being evaluated for LVAD and transplant.  He returns today for followup.      Overall, his clinical trajectory is very concerning.  Although he states that he feels about the same, there are several objective signs of which are indicating end-stage heart failure.  His cardiopulmonary exercise testing from last year  showed a VO2 of 10.2.  His VE/VCO2 slope was high normal.  His systolic blood pressure dropped during exercise.  Subsequent right heart catheterizations have shown moderately reduced cardiac index with elevated left-sided filling pressure.  His renal function has been elevated for many years, and this has been gradually, slowly getting worse.  All these things are suggestive of ambulatory cardiogenic shock.  Now despite reducing his beta blocker dose, he is having worsening lower extremity edema.  His NT-proBNP is increasing.  He is tachycardic at rest.      I discussed with Mr. Tee Henderson and his wife that I am overall concerned about his trajectory.  I encouraged them to strongly consider advanced heart failure therapies, including LVAD or transplant.  His candidacy for cardiac transplantation is unclear.  The main prohibitive factor for cardiac transplantation would be his renal function.  If he were to be willing, we will admit him to the hospital and start him on an inotrope to see how much of an improvement in his creatinine he has.  If not, then he would likely be a candidate for destination LVAD therapy.  In general, we do not consider patients for heart-kidney transplantation after the age of 65, unless they are really healthy and especially with a living related donor.  He is still ambivalent about proceeding with the advanced therapies.  He wants to talk to his kids.  I have encouraged him to make a decision as soon as possible.      In the interim, I have recommended to stop the beta blockers in view of his worsening heart failure.  We will increase his Lasix to 40 mg twice daily.  We will schedule him for a repeat cardiopulmonary exercise testing and right heart catheterization as soon as possible, in the next week or so.  If this shows worsening end-stage heart failure or ambulatory cardiogenic shock, we will recommend him to be admitted to the hospital for initiation of IV inotrope, completion of  the advanced heart failure options and make a decision on the candidacy and treatment option.      I explained all these things through an .  We will keep you apprised of his treatment plan and his decisions.      It was a pleasure meeting Mr. Tee Henderson in our Advanced Heart Failure Clinic at the [facility].  We thank you for involving us in his care.  Please do not hesitate to call us in the interim if you have any further questions.       Sincerely,   Arvin Sheridan MD   [facility]  Heart Failure, Transplant, and Mechanical Circulatory Support Cardiology   Cardiovascular Division  [facility]   [phone]                D: 2019   T: 2019   MT: al      Name:     BUCK CABAN   MRN:      [id number]        Account:      [id number]   :      1955           Service Date: 2019      Document: [id number]        Arvin Sheridan MD

## 2019-11-25 NOTE — LETTER
11/25/2019       RE: Lucian Henderson Sr.  [address] [facility] [address]     Dear Colleague,    Thank you for referring your patient, Lucian Henderson Sr., to the [facility] ENDOCRINOLOGY at [facility]. Please see a copy of my visit note below.    [facility]- Outpatient Diabetes Follow up    Lucian is a 64 year old male with Hx TIIDM with coronary artery disease, HTN, CKD III and CHANTEL who presents for follow up of diabetes. He was diagnosed 20 years ago and remained on oral medications until 1 year ago, when Lantus was initiated.     He was last seen by Marisabel Prieto PA-C 8/8/19. A1c was 7.9% and no changes to diabetes regimen were made at that time.    He has been evaluated by transplant service regarding LVAD/heart transplant, and is undergoing workup. He is not sure he wants to go through with LVAD or transplant.     Fasting glucoses reported as 's, with pre-prandial glucoses 140's typically (per meter, some are higher).  He reports seeing something on TV regarding a medication that can be taken once monthly for diabetes control, was wondering what it was (likely once weekly GLP1 agonists)    Pt denies headaches, visual changes, n/v, SOB at rest, chest pain, abd pain, nausea/vomiting, diarrhea, dysuria, hematuria or foot ulcers.    Current Diabetes Regimen:   Lantus 40 units AM, 8 units PM  Glimepiride 4mg daily AM  Januvia 50 mg daily.    Glucometer Settings  Glucometer type: true balance meter  Checking 1-2 times per day on average   Average glucose: 144  SD: 34  Documented hypoglycemia: no      Weight: 204lbs , compared to recent of 200 lbs   Physical Activity: active with grandkids, walking. Pool and awling at the gym.  Nutrition: three meals daily, snacking less. Vegetables, fruits.     Diabetes Care  Retinopathy: yes, proliferative diabetic retinopathy, last seen by ophthalmo 9/26/19 and received PRP and Avastin.      Nephropathy: BP well controlled. + Hx microalbuminuria. Creatinine 1.8, GFR 38  (stable to slightly improved). Taking ACEi/ARB: yes    Neuropathy: none.    Foot Exam: no wounds or ulcers.     Lipids: Taking ASA: yes, statin: yes  LDL Cholesterol Calculated   Date Value Ref Range Status   07/08/2019 41 <100 mg/dL Final     Comment:     Desirable:       <100 mg/dl      ROS: 10 point review of systems completed; pertinent positives and negatives documented in HPI    Allergies  Allergies   Allergen Reactions     No Known Drug Allergies        Medications  Current Outpatient Medications   Medication Sig Dispense Refill     aspirin 81 MG tablet Take 1 tablet (81 mg) by mouth daily 30 tablet 11     atorvastatin (LIPITOR) 40 MG tablet Take 1 tablet (40 mg) by mouth daily 90 tablet 2     blood glucose (ACCU-CHEK SMARTVIEW) test strip USE TO TEST THREE TIMES DAILY AS DIRECTED 100 strip 0     blood glucose (NO BRAND SPECIFIED) test strip Use to test blood sugar 2 times daily or as directed. 200 strip 3     blood glucose monitoring (ACCU-CHEK FELIPE SMARTVIEW) meter device kit Use to test blood sugar 3-4 times daily, as directed. 1 kit 0     blood glucose monitoring (ONE TOUCH DELICA) lancets Use to test blood sugars 2 times daily. 200 each 3     carvedilol (COREG) 25 MG tablet Take 0.5 tablets (12.5 mg) by mouth 2 times daily (with meals) 180 tablet 1     carvedilol (COREG) 6.25 MG tablet Take 1 tablet (6.25 mg) by mouth 2 times daily (with meals) 180 tablet 2     clopidogrel (PLAVIX) 75 MG tablet Take 1 tablet (75 mg) by mouth daily 90 tablet 3     exenatide ER (BYDUREON) 2 MG pen Inject 2 mg Subcutaneous every 7 days 4 each 3     furosemide (LASIX) 20 MG tablet Take 1 tablet (20 mg) by mouth 2 times daily 180 tablet 3     glimepiride (AMARYL) 4 MG tablet Take 1 tablet (4 mg) by mouth every morning (before breakfast) 90 tablet 3     hydrALAZINE (APRESOLINE) 25 MG tablet Take 1 tablet (25 mg) by mouth 3 times daily 270  tablet 3     insulin glargine (LANTUS SOLOSTAR) 100 UNIT/ML pen Take 8 units in the morning and 40 units in the evening 45 mL 3     insulin pen needle (B-D U/F) 31G X 5 MM miscellaneous 1 Units by Device route 2 times daily Use 2 pen needles daily or as directed. 100 each 1     isosorbide mononitrate (IMDUR) 60 MG 24 hr tablet Take 1 tablet (60 mg) by mouth daily 90 tablet 3     losartan (COZAAR) 50 MG tablet Take 1 tablet (50 mg) by mouth daily 90 tablet 3     neomycin-polymyxin-dexamethasone (MAXITROL) 3.5-83111-0.1 ophthalmic ointment Place 0.5 inches into both eyes At Bedtime 1 Tube 3     nitroglycerin (NITROSTAT) 0.4 MG SL tablet Place 1 tablet (0.4 mg) under the tongue every 5 minutes as needed for chest pain If you are still having symptoms after 3 doses (15 minutes) call 911. 10 tablet 0     order for DME Auto CPAP 8-15 cmH20 1 Units 0       Family History  family history includes Diabetes in his brother, sister, and sister.    Social History   reports that he has never smoked. He has never used smokeless tobacco. He reports that he does not drink alcohol or use drugs.     Past Medical History  Past Medical History:   Diagnosis Date     CAD (coronary artery disease)      CHF (congestive heart failure) (H)      CKD (chronic kidney disease), stage III (H)      Cortical cataract of both eyes      Diabetes (H)      Hyperlipidemia      Hypertension      Ischemic cardiomyopathy      Obesity      CHANTEL (obstructive sleep apnea)     occas cpap     Osteoarthritis        Past Surgical History:   Procedure Laterality Date     C CABG, ARTERY-VEIN, THREE  02/2008     COLONOSCOPY N/A 8/7/2019    Procedure: COLONOSCOPY, WITH POLYPECTOMY AND BIOPSY;  Surgeon: Chauncey Morataya MD;  Location:  GI     CV RIGHT HEART CATH N/A 3/25/2019    Procedure: CV RIGHT HEART CATH;  Surgeon: Moises Santos MD;  Location:  HEART CARDIAC CATH LAB     CV RIGHT HEART CATH N/A 7/10/2019    Procedure: CV RIGHT HEART CATH;  Surgeon: Estelle  Jak Barclay MD;  Location:  HEART CARDIAC CATH LAB     IMPLANT AUTOMATIC IMPLANTABLE CARDIOVERTER DEFIBRILLATOR         Physical Exam  /76   Pulse 103   Wt 92.8 kg (204 lb 8 oz)   BMI 34.03 kg/m     Body mass index is 34.03 kg/m .    GENERAL : In no apparent distress. Wife and professional  present.  SKIN: Normal color, normal temperature, texture.  No  suspicious lesions or rashes  EYES: PERRLA.  No proptosis.  MOUTH: Moist, pink; pharynx clear  NECK: No visible masses. No palpable adenopathy, or masses. No goiter.  RESP: normal respiratory effort.  cough   ABDOMEN: soft, nontender to palpation   NEURO: awake, alert, responds appropriately to questions.  Moves all extremities; Gait normal.     EXTREMITIES: No ulcers or open wounds.     RESULTS    Lab Results   Component Value Date    A1C 7.9 07/08/2019    A1C 8.5 03/11/2019    A1C 7.6 10/16/2018    A1C 9.8 09/06/2017    A1C 9.1 07/05/2017     Creatinine   Date Value Ref Range Status   11/25/2019 2.12 (H) 0.66 - 1.25 mg/dL Final     GFR Estimate   Date Value Ref Range Status   11/25/2019 31 (L) >60 mL/min/[1.73_m2] Final     Comment:     Non  GFR Calc  Starting 12/18/2018, serum creatinine based estimated GFR (eGFR) will be   calculated using the Chronic Kidney Disease Epidemiology Collaboration   (CKD-EPI) equation.       Hemoglobin A1C   Date Value Ref Range Status   07/08/2019 7.9 (H) 0 - 5.6 % Final     Comment:     Normal <5.7% Prediabetes 5.7-6.4%  Diabetes 6.5% or higher - adopted from ADA   consensus guidelines.       Potassium   Date Value Ref Range Status   11/25/2019 4.8 3.4 - 5.3 mmol/L Final     ALT   Date Value Ref Range Status   07/08/2019 20 0 - 70 U/L Final     AST   Date Value Ref Range Status   07/08/2019 14 0 - 45 U/L Final     TSH   Date Value Ref Range Status   07/08/2019 1.30 0.40 - 4.00 mU/L Final       TSH   Date Value Ref Range Status   07/08/2019 1.30 0.40 - 4.00 mU/L Final   01/05/2017 1.44 0.40 -  4.00 mU/L Final   01/23/2015 1.16 0.40 - 4.00 mU/L Final     Comment:     Effective 7/30/2014, the reference range for this assay has changed to reflect   new instrumentation/methodology.     01/07/2013 0.98 0.4 - 5.0 mU/L Final   11/02/2011 1.14 0.4 - 5.0 mU/L Final       Creatinine   Date Value Ref Range Status   08/26/2019 1.81 (H) 0.66 - 1.25 mg/dL Final       Recent Labs   Lab Test 07/08/19  1021 08/16/18  0834  06/27/15  0755 01/11/15  1033   CHOL 104 91   < > 82 203*   HDL 27* 25*   < > 32* 34*   LDL 41 7   < > 31 Cannot estimate LDL when triglyceride exceeds 400 mg/dL  107   TRIG 181* 297*   < > 99 519*   CHOLHDLRATIO  --   --   --  2.6 6.0*    < > = values in this interval not displayed.     No results found for: ROXY, [id number], [id number]    ASSESSMENT/PLAN:    1. Diabetes type II, uncontrolled, with coronary artery disease, proliferative diabetic retinopathy, and renal dysfunction   -most recent A1c 7.9%>7.8%, target closer to 7% given his co morbidities. Suspect postprandial hyperglycemia is leading to higher A1c, as pre-meal BG appear to be generally in a good range.   -GLP1r Agonist indicated, well studied benefit in coronary disease and more effective than DPP4 inhibitors. He denies hx pancreatitis, thyroid cancer/disorders in family.   -pt agreeable to starting GLP1r Agonist, would strongly prefer once weekly Bydureon versus once daily Victoza. Initiate 2mg weekly. Will need to watch renal function carefully, and consider switching to once weekly Trulicity, or once daily Victoza if renal function worsens.   -discontinue Januvia once Bydureon started.    -continue Amaryl- assess for discontinuation once Bydureon reaches steady state   -continue Lantus 40 units in the morning, 8 units in the evening.    -instructed to call clinic with any consistent low BG, or if any concerns with the Bydureon.     2. Thyroid:    -most recent TSH 1.3 in 7/2019      Refills today:  1. Bydureon, lancets, pen  needles.     Follow up:  1. With [facility] in 3 months.   2. Diabetes Educator follow up: PRN    The patient is  enrolled in [employer] services    This patient has met [place] community measures for diabetes control: no (D5: Control blood pressure ,Lower bad cholesterol Maintain blood sugar, Be tobacco-free, Take aspirin as recommended)    This patient is eligible for graduation from [facility] clinic: no    I spent 25 minutes with this patient face to face and explained the conditions and plans (more than 50% of time was counseling/coordination of care, diabetes management, follow up plan for worsening hyper and hypoglycemia) . The patient understood and is satisfied with today's visit.     BENIGNO Page, PA-C  [facility]   Pager [phone]

## 2019-11-25 NOTE — NURSING NOTE
Chief Complaint   Patient presents with     Follow Up     RTN HF: 64 year old male presents with systolic heart failure for follow up with labs and device prior     Vitals were taken and medications were reconciled.     Gissel Carr CMA    1:27 PM

## 2019-11-25 NOTE — PROGRESS NOTES
[facility]- Outpatient Diabetes Follow up    Lucian is a 64 year old male with Hx TIIDM with coronary artery disease, HTN, CKD III and CHANTEL who presents for follow up of diabetes. He was diagnosed 20 years ago and remained on oral medications until 1 year ago, when Lantus was initiated.     He was last seen by Marisabel Prieto PA-C 8/8/19. A1c was 7.9% and no changes to diabetes regimen were made at that time.    He has been evaluated by transplant service regarding LVAD/heart transplant, and is undergoing workup. He is not sure he wants to go through with LVAD or transplant.     Fasting glucoses reported as 's, with pre-prandial glucoses 140's typically (per meter, some are higher).  He reports seeing something on TV regarding a medication that can be taken once monthly for diabetes control, was wondering what it was (likely once weekly GLP1 agonists)    Pt denies headaches, visual changes, n/v, SOB at rest, chest pain, abd pain, nausea/vomiting, diarrhea, dysuria, hematuria or foot ulcers.    Current Diabetes Regimen:   Lantus 40 units AM, 8 units PM  Glimepiride 4mg daily AM  Januvia 50 mg daily.    Glucometer Settings  Glucometer type: true balance meter  Checking 1-2 times per day on average   Average glucose: 144  SD: 34  Documented hypoglycemia: no      Weight: 204lbs , compared to recent of 200 lbs   Physical Activity: active with grandkids, walking. Pool and awling at the gym.  Nutrition: three meals daily, snacking less. Vegetables, fruits.     Diabetes Care  Retinopathy: yes, proliferative diabetic retinopathy, last seen by ophthalmo 9/26/19 and received PRP and Avastin.     Nephropathy: BP well controlled. + Hx microalbuminuria. Creatinine 1.8, GFR 38  (stable to slightly improved). Taking ACEi/ARB: yes    Neuropathy: none.    Foot Exam: no wounds or ulcers.     Lipids: Taking ASA: yes, statin: yes  LDL Cholesterol Calculated   Date Value Ref Range Status   07/08/2019 41 <100 mg/dL Final      Comment:     Desirable:       <100 mg/dl      ROS: 10 point review of systems completed; pertinent positives and negatives documented in HPI    Allergies  Allergies   Allergen Reactions     No Known Drug Allergies        Medications  Current Outpatient Medications   Medication Sig Dispense Refill     aspirin 81 MG tablet Take 1 tablet (81 mg) by mouth daily 30 tablet 11     atorvastatin (LIPITOR) 40 MG tablet Take 1 tablet (40 mg) by mouth daily 90 tablet 2     blood glucose (ACCU-CHEK SMARTVIEW) test strip USE TO TEST THREE TIMES DAILY AS DIRECTED 100 strip 0     blood glucose (NO BRAND SPECIFIED) test strip Use to test blood sugar 2 times daily or as directed. 200 strip 3     blood glucose monitoring (ACCU-CHEK FELIPE SMARTVIEW) meter device kit Use to test blood sugar 3-4 times daily, as directed. 1 kit 0     blood glucose monitoring (ONE TOUCH DELICA) lancets Use to test blood sugars 2 times daily. 200 each 3     carvedilol (COREG) 25 MG tablet Take 0.5 tablets (12.5 mg) by mouth 2 times daily (with meals) 180 tablet 1     carvedilol (COREG) 6.25 MG tablet Take 1 tablet (6.25 mg) by mouth 2 times daily (with meals) 180 tablet 2     clopidogrel (PLAVIX) 75 MG tablet Take 1 tablet (75 mg) by mouth daily 90 tablet 3     exenatide ER (BYDUREON) 2 MG pen Inject 2 mg Subcutaneous every 7 days 4 each 3     furosemide (LASIX) 20 MG tablet Take 1 tablet (20 mg) by mouth 2 times daily 180 tablet 3     glimepiride (AMARYL) 4 MG tablet Take 1 tablet (4 mg) by mouth every morning (before breakfast) 90 tablet 3     hydrALAZINE (APRESOLINE) 25 MG tablet Take 1 tablet (25 mg) by mouth 3 times daily 270 tablet 3     insulin glargine (LANTUS SOLOSTAR) 100 UNIT/ML pen Take 8 units in the morning and 40 units in the evening 45 mL 3     insulin pen needle (B-D U/F) 31G X 5 MM miscellaneous 1 Units by Device route 2 times daily Use 2 pen needles daily or as directed. 100 each 1     isosorbide mononitrate (IMDUR) 60 MG 24 hr tablet  Take 1 tablet (60 mg) by mouth daily 90 tablet 3     losartan (COZAAR) 50 MG tablet Take 1 tablet (50 mg) by mouth daily 90 tablet 3     neomycin-polymyxin-dexamethasone (MAXITROL) 3.5-42561-3.1 ophthalmic ointment Place 0.5 inches into both eyes At Bedtime 1 Tube 3     nitroglycerin (NITROSTAT) 0.4 MG SL tablet Place 1 tablet (0.4 mg) under the tongue every 5 minutes as needed for chest pain If you are still having symptoms after 3 doses (15 minutes) call 911. 10 tablet 0     order for DME Auto CPAP 8-15 cmH20 1 Units 0       Family History  family history includes Diabetes in his brother, sister, and sister.    Social History   reports that he has never smoked. He has never used smokeless tobacco. He reports that he does not drink alcohol or use drugs.     Past Medical History  Past Medical History:   Diagnosis Date     CAD (coronary artery disease)      CHF (congestive heart failure) (H)      CKD (chronic kidney disease), stage III (H)      Cortical cataract of both eyes      Diabetes (H)      Hyperlipidemia      Hypertension      Ischemic cardiomyopathy      Obesity      CHANTEL (obstructive sleep apnea)     occas cpap     Osteoarthritis        Past Surgical History:   Procedure Laterality Date     C CABG, ARTERY-VEIN, THREE  02/2008     COLONOSCOPY N/A 8/7/2019    Procedure: COLONOSCOPY, WITH POLYPECTOMY AND BIOPSY;  Surgeon: Chauncey Morataya MD;  Location:  GI     CV RIGHT HEART CATH N/A 3/25/2019    Procedure: CV RIGHT HEART CATH;  Surgeon: Moises Santos MD;  Location:  HEART CARDIAC CATH LAB     CV RIGHT HEART CATH N/A 7/10/2019    Procedure: CV RIGHT HEART CATH;  Surgeon: Jak Mccabe MD;  Location:  HEART CARDIAC CATH LAB     IMPLANT AUTOMATIC IMPLANTABLE CARDIOVERTER DEFIBRILLATOR         Physical Exam  /76   Pulse 103   Wt 92.8 kg (204 lb 8 oz)   BMI 34.03 kg/m    Body mass index is 34.03 kg/m .    GENERAL : In no apparent distress. Wife and professional  present.  SKIN:  Normal color, normal temperature, texture.  No  suspicious lesions or rashes  EYES: PERRLA.  No proptosis.  MOUTH: Moist, pink; pharynx clear  NECK: No visible masses. No palpable adenopathy, or masses. No goiter.  RESP: normal respiratory effort.  cough   ABDOMEN: soft, nontender to palpation   NEURO: awake, alert, responds appropriately to questions.  Moves all extremities; Gait normal.     EXTREMITIES: No ulcers or open wounds.     RESULTS    Lab Results   Component Value Date    A1C 7.9 07/08/2019    A1C 8.5 03/11/2019    A1C 7.6 10/16/2018    A1C 9.8 09/06/2017    A1C 9.1 07/05/2017     Creatinine   Date Value Ref Range Status   11/25/2019 2.12 (H) 0.66 - 1.25 mg/dL Final     GFR Estimate   Date Value Ref Range Status   11/25/2019 31 (L) >60 mL/min/[1.73_m2] Final     Comment:     Non  GFR Calc  Starting 12/18/2018, serum creatinine based estimated GFR (eGFR) will be   calculated using the Chronic Kidney Disease Epidemiology Collaboration   (CKD-EPI) equation.       Hemoglobin A1C   Date Value Ref Range Status   07/08/2019 7.9 (H) 0 - 5.6 % Final     Comment:     Normal <5.7% Prediabetes 5.7-6.4%  Diabetes 6.5% or higher - adopted from ADA   consensus guidelines.       Potassium   Date Value Ref Range Status   11/25/2019 4.8 3.4 - 5.3 mmol/L Final     ALT   Date Value Ref Range Status   07/08/2019 20 0 - 70 U/L Final     AST   Date Value Ref Range Status   07/08/2019 14 0 - 45 U/L Final     TSH   Date Value Ref Range Status   07/08/2019 1.30 0.40 - 4.00 mU/L Final       TSH   Date Value Ref Range Status   07/08/2019 1.30 0.40 - 4.00 mU/L Final   01/05/2017 1.44 0.40 - 4.00 mU/L Final   01/23/2015 1.16 0.40 - 4.00 mU/L Final     Comment:     Effective 7/30/2014, the reference range for this assay has changed to reflect   new instrumentation/methodology.     01/07/2013 0.98 0.4 - 5.0 mU/L Final   11/02/2011 1.14 0.4 - 5.0 mU/L Final       Creatinine   Date Value Ref Range Status   08/26/2019 1.81  (H) 0.66 - 1.25 mg/dL Final       Recent Labs   Lab Test 07/08/19  1021 08/16/18  0834  06/27/15  0755 01/11/15  1033   CHOL 104 91   < > 82 203*   HDL 27* 25*   < > 32* 34*   LDL 41 7   < > 31 Cannot estimate LDL when triglyceride exceeds 400 mg/dL  107   TRIG 181* 297*   < > 99 519*   CHOLHDLRATIO  --   --   --  2.6 6.0*    < > = values in this interval not displayed.     No results found for: [id number], [id number], [id number]    ASSESSMENT/PLAN:    1. Diabetes type II, uncontrolled, with coronary artery disease, proliferative diabetic retinopathy, and renal dysfunction   -most recent A1c 7.9%>7.8%, target closer to 7% given his co morbidities. Suspect postprandial hyperglycemia is leading to higher A1c, as pre-meal BG appear to be generally in a good range.   -GLP1r Agonist indicated, well studied benefit in coronary disease and more effective than DPP4 inhibitors. He denies hx pancreatitis, thyroid cancer/disorders in family.   -pt agreeable to starting GLP1r Agonist, would strongly prefer once weekly Bydureon versus once daily Victoza. Initiate 2mg weekly. Will need to watch renal function carefully, and consider switching to once weekly Trulicity, or once daily Victoza if renal function worsens.   -discontinue Januvia once Bydureon started.    -continue Amaryl- assess for discontinuation once Bydureon reaches steady state   -continue Lantus 40 units in the morning, 8 units in the evening.    -instructed to call clinic with any consistent low BG, or if any concerns with the Bydureon.     2. Thyroid:    -most recent TSH 1.3 in 7/2019      Refills today:  1. Bydureon, lancets, pen needles.     Follow up:  1. With [facility] in 3 months.   2. Diabetes Educator follow up: PRN    The patient is  enrolled in [employer] services    This patient has met [place] community measures for diabetes control: no (D5: Control blood pressure ,Lower bad cholesterol Maintain blood sugar, Be tobacco-free, Take aspirin as  recommended)    This patient is eligible for graduation from [facility] clinic: no    I spent 25 minutes with this patient face to face and explained the conditions and plans (more than 50% of time was counseling/coordination of care, diabetes management, follow up plan for worsening hyper and hypoglycemia) . The patient understood and is satisfied with today's visit.     BENIGNO Page, PA-C  [facility]   Pager [phone]

## 2019-11-25 NOTE — PATIENT INSTRUCTIONS
1. We started a medication called Bydureon; this is a once weekly injectable medication. Take the same day every week (Sunday).   2. Stop the Januvia the day that you start Bydureon.   3. Continue your Amaryl (glimepiride) and Lantus.     Please call the clinic at [phone] if you  have non-urgent questions regarding your blood sugars or insulin.     If you have urgent questions or concerns regarding your blood sugars or insulin, you may contact [phone] (the main hospital ). Ask to speak with the endocrinologist on call.      Thank you for letting the Diabetes Management Team be involved in your care!

## 2019-11-25 NOTE — LETTER
2019      RE: Lucian Henderson Sr.  [address] [facility] [address]       Service Date: 2019      Suyapa Hatfield MD   [id number] [place] [address]      RE: Lucian Henderson   MRN: [id number]   : 1955      Dear Dr. Hatfield:      We had the pleasure of seeing Mr. Tee Henderson for followup in our Advanced Heart Failure Clinic.  As you know, he is a 64-year-old gentleman with ischemic cardiomyopathy and severe LV systolic dysfunction who is currently on medical therapy.      Mr. Tee Henderson returns today for his routine followup.  We recommended him to undergo an evaluation for LVAD and transplant.  He has completed most of the workup except for dental.  He has been ambivalent about proceeding to advanced therapies.  In light of this, he did not complete his dental workup.      He was last seen in clinic in August.  A repeat heart catheterization at that time showed worsening left-sided filling pressure with moderately reduced cardiac index.  In light of this, we decreased his beta blocker to 6.25 mg twice daily and increased his Lasix dose.      Today, he states that he is about the same.  He has not noticed any worsening exertional shortness of breath.  He has functional class III symptoms.  No symptoms at rest or with minimal exertion; however, anything more than usual, he gets winded.  He has lower extremity swelling on and off.  No lightheadedness, dizziness or syncope.  No ICD shocks.  No exertional chest pain or chest pressure.  No recent hospitalization or ER visit.      He has been compliant in taking his medications regularly.      PAST MEDICAL HISTORY:   1.  Hypertension.   2.  Hyperlipidemia.   3.  Diabetes.   4.  Obesity.   5.  Anterior wall MI.   6.  Ischemic cardiomyopathy with an estimated ejection fraction of 10%-20%.   7.  Chronic kidney disease.   8.  Obstructive sleep apnea.      MEDICATIONS:    Current Outpatient  Medications   Medication Sig     aspirin 81 MG tablet Take 1 tablet (81 mg) by mouth daily     atorvastatin (LIPITOR) 40 MG tablet Take 1 tablet (40 mg) by mouth daily     blood glucose (ACCU-CHEK SMARTVIEW) test strip USE TO TEST THREE TIMES DAILY AS DIRECTED     blood glucose (NO BRAND SPECIFIED) test strip Use to test blood sugar 2 times daily or as directed.     blood glucose monitoring (ACCU-CHEK FELIPE SMARTVIEW) meter device kit Use to test blood sugar 3-4 times daily, as directed.     blood glucose monitoring (ONE TOUCH DELICA) lancets Use to test blood sugars 2 times daily.     clopidogrel (PLAVIX) 75 MG tablet Take 1 tablet (75 mg) by mouth daily     exenatide ER (BYDUREON) 2 MG pen Inject 2 mg Subcutaneous every 7 days     furosemide (LASIX) 20 MG tablet Take 2 tablets (40 mg) by mouth 2 times daily     glimepiride (AMARYL) 4 MG tablet Take 1 tablet (4 mg) by mouth every morning (before breakfast)     hydrALAZINE (APRESOLINE) 25 MG tablet Take 1 tablet (25 mg) by mouth 3 times daily     insulin glargine (LANTUS SOLOSTAR) 100 UNIT/ML pen Take 8 units in the morning and 40 units in the evening     insulin pen needle (B-D U/F) 31G X 5 MM miscellaneous 1 Units by Device route 2 times daily Use 2 pen needles daily or as directed.     isosorbide mononitrate (IMDUR) 60 MG 24 hr tablet Take 1 tablet (60 mg) by mouth daily     losartan (COZAAR) 50 MG tablet Take 1 tablet (50 mg) by mouth daily     neomycin-polymyxin-dexamethasone (MAXITROL) 3.5-05461-2.1 ophthalmic ointment Place 0.5 inches into both eyes At Bedtime     nitroglycerin (NITROSTAT) 0.4 MG SL tablet Place 1 tablet (0.4 mg) under the tongue every 5 minutes as needed for chest pain If you are still having symptoms after 3 doses (15 minutes) call 911.     order for DME Auto CPAP 8-15 cmH20     Current Facility-Administered Medications   Medication     bevacizumab (AVASTIN) intravitreal inj 1.25 mg     bevacizumab (AVASTIN) intravitreal inj 1.25 mg      erythromycin (ROMYCIN) ophthalmic ointment     lidocaine (PF) (XYLOCAINE) 2 % injection 10 mL     Facility-Administered Medications Ordered in Other Visits   Medication     sodium chloride (PF) 0.9% PF flush 10 mL       REVIEW OF SYSTEMS:  A detailed 10-point review of systems was obtained as described in the History of Present Illness.  All other systems reviewed and are negative.      PHYSICAL EXAMINATION:  He was comfortable.  He was in no apparent distress.  His blood pressure was 116/78.  His pulse rate was 101.  His respiratory rate was 18.  He was saturating 97% on room air.  His weight was 205 pounds.  He was 5 feet 5 inches tall.  His BMI was 34.11.  His JVD was visible at 8 cm above manubrium sternal angle.  His carotids were 1+ bilaterally.  His extremities were relatively cooler.  He had no pallor, cyanosis or jaundice.  Cardiac auscultation revealed sinus tachycardia, normal S1 and S2.  No murmur, rub or gallop.  Auscultation of the lungs revealed equal air entry on both sides with no added sounds.  His abdomen was soft with normal bowel sounds, no tenderness, no rigidity, no guarding.  He had no focal neurological deficit.  Extremities showed 2+ edema bilaterally all the way up to the knee.      His NT-proBNP is significantly increased at 6187. His renal function is stable with a BUN of 31 and creatinine of 2.12.  His electrolytes and CBC were unremarkable.     His ICD showed 1 episode of VT with no therapy, 11 nonsustained VTs.      ASSESSMENT AND PLAN:      Mr. Lucian Henderson is a 64-year-old gentleman with ischemic cardiomyopathy and severe LV systolic dysfunction who is currently being evaluated for LVAD and transplant.  He returns today for followup.      Overall, his clinical trajectory is very concerning.  Although he states that he feels about the same, there are several objective signs of which are indicating end-stage heart failure.  His cardiopulmonary exercise testing from last year  showed a VO2 of 10.2.  His VE/VCO2 slope was high normal.  His systolic blood pressure dropped during exercise.  Subsequent right heart catheterizations have shown moderately reduced cardiac index with elevated left-sided filling pressure.  His renal function has been elevated for many years, and this has been gradually, slowly getting worse.  All these things are suggestive of ambulatory cardiogenic shock.  Now despite reducing his beta blocker dose, he is having worsening lower extremity edema.  His NT-proBNP is increasing.  He is tachycardic at rest.      I discussed with Mr. Tee Henderson and his wife that I am overall concerned about his trajectory.  I encouraged them to strongly consider advanced heart failure therapies, including LVAD or transplant.  His candidacy for cardiac transplantation is unclear.  The main prohibitive factor for cardiac transplantation would be his renal function.  If he were to be willing, we will admit him to the hospital and start him on an inotrope to see how much of an improvement in his creatinine he has.  If not, then he would likely be a candidate for destination LVAD therapy.  In general, we do not consider patients for heart-kidney transplantation after the age of 65, unless they are really healthy and especially with a living related donor.  He is still ambivalent about proceeding with the advanced therapies.  He wants to talk to his kids.  I have encouraged him to make a decision as soon as possible.      In the interim, I have recommended to stop the beta blockers in view of his worsening heart failure.  We will increase his Lasix to 40 mg twice daily.  We will schedule him for a repeat cardiopulmonary exercise testing and right heart catheterization as soon as possible, in the next week or so.  If this shows worsening end-stage heart failure or ambulatory cardiogenic shock, we will recommend him to be admitted to the hospital for initiation of IV inotrope, completion of  the advanced heart failure options and make a decision on the candidacy and treatment option.      I explained all these things through an .  We will keep you apprised of his treatment plan and his decisions.      It was a pleasure meeting Mr. Tee Henderson in our Advanced Heart Failure Clinic at the [facility].  We thank you for involving us in his care.  Please do not hesitate to call us in the interim if you have any further questions.       Sincerely,   Arvin Sheridan MD   [facility]  Heart Failure, Transplant, and Mechanical Circulatory Support Cardiology   Cardiovascular Division  [facility]   [phone]                D: 2019   T: 2019   MT: al      Name:     BUCK CABAN   MRN:      [id number]        Account:      [id number]   :      1955           Service Date: 2019      Document: [id number]        Arvin Sheridan MD

## 2019-11-25 NOTE — PATIENT INSTRUCTIONS
It was a pleasure to see you in clinic today.  Please do not hesitate to call with any questions or concerns.  You are scheduled for a remote transmission on 2/26/20.  We look forward to seeing you in clinic at your next device check in 6 months.    David Nolan, RN  Electrophysiology Nurse Clinician  [facility]    During Business Hours Please Call:  [phone]  After Hours Please Call:  [phone] - select option #4 and ask for job code [phone]

## 2019-11-27 ENCOUNTER — OFFICE VISIT (OUTPATIENT)
Dept: OPHTHALMOLOGY | Facility: CLINIC | Age: 64
End: 2019-11-27
Attending: OPHTHALMOLOGY
Payer: COMMERCIAL

## 2019-11-27 DIAGNOSIS — E11.3513 TYPE 2 DIABETES MELLITUS WITH PROLIFERATIVE RETINOPATHY OF BOTH EYES AND MACULAR EDEMA, UNSPECIFIED WHETHER LONG TERM INSULIN USE (H): ICD-10-CM

## 2019-11-27 DIAGNOSIS — H26.9 NUCLEAR CATARACT, NONSENILE: Primary | ICD-10-CM

## 2019-11-27 PROCEDURE — 25000128 H RX IP 250 OP 636: Mod: ZF | Performed by: OPHTHALMOLOGY

## 2019-11-27 PROCEDURE — C9257 BEVACIZUMAB INJECTION: HCPCS | Mod: ZF | Performed by: OPHTHALMOLOGY

## 2019-11-27 PROCEDURE — 67028 INJECTION EYE DRUG: CPT | Mod: ZF | Performed by: OPHTHALMOLOGY

## 2019-11-27 PROCEDURE — 40000269 ZZH STATISTIC NO CHARGE FACILITY FEE: Mod: ZF

## 2019-11-27 PROCEDURE — 92134 CPTRZ OPH DX IMG PST SGM RTA: CPT | Mod: ZF | Performed by: OPHTHALMOLOGY

## 2019-11-27 RX ADMIN — Medication 1.25 MG: at 14:49

## 2019-11-27 RX ADMIN — Medication 1.25 MG: at 14:48

## 2019-11-27 ASSESSMENT — VISUAL ACUITY
METHOD: SNELLEN - LINEAR
OS_SC: 20/80
OD_SC+: -2
OD_SC: 20/40

## 2019-11-27 ASSESSMENT — SLIT LAMP EXAM - LIDS
COMMENTS: PTOSIS OD>OS
COMMENTS: PTOSIS OD>OS

## 2019-11-27 ASSESSMENT — CUP TO DISC RATIO
OD_RATIO: 0.2
OS_RATIO: 0.2

## 2019-11-27 ASSESSMENT — TONOMETRY
IOP_METHOD: ICARE
OS_IOP_MMHG: 09
OD_IOP_MMHG: 11

## 2019-11-27 ASSESSMENT — EXTERNAL EXAM - RIGHT EYE: OD_EXAM: NORMAL

## 2019-11-27 ASSESSMENT — EXTERNAL EXAM - LEFT EYE: OS_EXAM: NORMAL

## 2019-11-27 NOTE — NURSING NOTE
Chief Complaints and History of Present Illnesses   Patient presents with     Diabetic Retinopathy Follow Up     Chief Complaint(s) and History of Present Illness(es)     Diabetic Retinopathy Follow Up     Laterality: both eyes    Onset: 1 month ago              Comments     Pt. States no change in VA BE.  No pain BE.  No flashes or floaters BE.  Concha Garber COT 2:04 PM November 27, 2019

## 2019-11-27 NOTE — PROGRESS NOTES
CC: follow up proliferative diabetic retinopathy    s/p PRP left eye 6/5/29, PRP right eye 6/12/19  Patient needs DMV form fill out today and possible avastin inj  Pre-op cataract evaluation left eye     Interval Update: reports doing better, no interval changes. No distortion in vision each eye. No flashes, floaters, shadow/curtain over vision.  Reports some changes in vision left eye     HPI: Two weeks prior to presentation to retina clinic, pt woke up and suddenly had blurred vision in both eyes. He went to the ED for possible stroke workup but was found to have count fingers visual acuity. He was evaluated by Dr. Ronak Perez and found to have bilateral vitreous hemorrhages.    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 No neovascularization of the disc. Microaneurysms   - left eye: neovascularization elsewhere, severe peripheral ischemia. (+) neovascularization of the disc. Microaneurysms     optos pic consistent with exam  optos Autofluorescence hypoautofluorescence of the hemes    Optical Coherence Tomography 11/27/19   - RE: vitreous opacities; rare cystic changes  - improved  - LE: Epiretinal membrane. Stable IRF.   Prior Extramacular Optical Coherence Tomography showed tractional retinal detachment about 4DD inferior to the macula and another Tractional retinal detachment supero-temporal quadrant left eye     Octopus visual field 09/26/19   Right eye: H 140 degrees right eye ; V 105 degrees  Left eye: H  125 degrees left eye ; V 105 degrees    intraocular lens  calculations 09/26/19   AL right eye 23.97  Left eye: 24.19    Assessment and Plan  1. 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   - today stable eye exam    2. Nuclear sclerotic and cortical cataracts, both eyes  - see below    3. Epiretinal membrane left eye   See below    Plan:  Left eye: Cataract extraction intraocular lens, Pars plana vitrectomy (PPV), endolaser and membrane peel, Air fluid exchange and possible gas left eye  Peribulbar block  2 hour case    I reviewed the indications, risks, benefits, and alternatives of the proposed surgical procedure including, but not limited to, failure to improve vision or further loss of vision,  and need for additional surgery, bleeding, infection, loss of vision and the remote possibility of complications of anesthesia. 1:1000 risk of infection/bleed/loss of eye; 1:100 risk of RD and need for further surgery. Patient aware of prolonged healing after retinal surgery (up to a year after surgery) as well as possibility of a gas/SO  instillation into eye. Patient agreed to proceed with surgery.  I provided multiple opportunities for the questions, answered all questions to the best of my ability, and confirmed that my answers and my discussion were understood.      Follow up post-op  ~~~~~~~~~~~~~~~~~~~~~~~~~~~~~~~~~~   Complete documentation of historical and exam elements from today's encounter can be found in the full encounter summary report (not reduplicated in this progress note).  I personally obtained the chief complaint(s) and history of present illness.  I confirmed and edited as necessary the review of  systems, past medical/surgical history, family history, social history, and examination findings as documented by others; and I examined the patient myself.  I personally reviewed the relevant tests, images, and reports as documented above.  I personally reviewed the ophthalmic test(s) associated with this encounter, agree with the interpretation(s) as documented by the resident/fellow, and have edited the corresponding report(s) as necessary.   I formulated and edited as necessary the assessment and plan and discussed the findings and management plan with the patient and family and No resident or fellow assisted with the procedures performed.  I performed the procedures myself.    Triny Bunch MD   of Ophthalmology.  Retina Service   Department of Ophthalmology and Visual Neurosciences   [facility]  Phone: [phone]   Fax: [phone]

## 2019-11-28 LAB
MDC_IDC_LEAD_IMPLANT_DT: NORMAL
MDC_IDC_LEAD_LOCATION: NORMAL
MDC_IDC_LEAD_LOCATION_DETAIL_1: NORMAL
MDC_IDC_LEAD_MFG: NORMAL
MDC_IDC_LEAD_MODEL: NORMAL
MDC_IDC_LEAD_POLARITY_TYPE: NORMAL
MDC_IDC_LEAD_SERIAL: NORMAL
MDC_IDC_MSMT_BATTERY_DTM: NORMAL
MDC_IDC_MSMT_BATTERY_REMAINING_LONGEVITY: 108 MO
MDC_IDC_MSMT_BATTERY_STATUS: NORMAL
MDC_IDC_MSMT_CAP_CHARGE_DTM: NORMAL
MDC_IDC_MSMT_CAP_CHARGE_ENERGY: 0 J
MDC_IDC_MSMT_CAP_CHARGE_TIME: 0 S
MDC_IDC_MSMT_CAP_CHARGE_TIME: 10.37
MDC_IDC_MSMT_CAP_CHARGE_TYPE: NORMAL
MDC_IDC_MSMT_CAP_CHARGE_TYPE: NORMAL
MDC_IDC_MSMT_LEADCHNL_RV_IMPEDANCE_VALUE: 456 OHM
MDC_IDC_MSMT_LEADCHNL_RV_PACING_THRESHOLD_AMPLITUDE: 0.9 V
MDC_IDC_MSMT_LEADCHNL_RV_PACING_THRESHOLD_PULSEWIDTH: 0.5 MS
MDC_IDC_MSMT_LEADCHNL_RV_SENSING_INTR_AMPL: 17.9 MV
MDC_IDC_PG_IMPLANT_DTM: NORMAL
MDC_IDC_PG_MFG: NORMAL
MDC_IDC_PG_MODEL: NORMAL
MDC_IDC_PG_SERIAL: NORMAL
MDC_IDC_PG_TYPE: NORMAL
MDC_IDC_SESS_CLINIC_NAME: NORMAL
MDC_IDC_SESS_DTM: NORMAL
MDC_IDC_SESS_TYPE: NORMAL
MDC_IDC_SET_BRADY_HYSTRATE: NORMAL
MDC_IDC_SET_BRADY_LOWRATE: 40 {BEATS}/MIN
MDC_IDC_SET_BRADY_MODE: NORMAL
MDC_IDC_SET_LEADCHNL_RV_PACING_AMPLITUDE: 2.4 V
MDC_IDC_SET_LEADCHNL_RV_PACING_ANODE_ELECTRODE_1: NORMAL
MDC_IDC_SET_LEADCHNL_RV_PACING_ANODE_LOCATION_1: NORMAL
MDC_IDC_SET_LEADCHNL_RV_PACING_CAPTURE_MODE: NORMAL
MDC_IDC_SET_LEADCHNL_RV_PACING_CATHODE_ELECTRODE_1: NORMAL
MDC_IDC_SET_LEADCHNL_RV_PACING_CATHODE_LOCATION_1: NORMAL
MDC_IDC_SET_LEADCHNL_RV_PACING_POLARITY: NORMAL
MDC_IDC_SET_LEADCHNL_RV_PACING_PULSEWIDTH: 0.5 MS
MDC_IDC_SET_LEADCHNL_RV_SENSING_ADAPTATION_MODE: NORMAL
MDC_IDC_SET_LEADCHNL_RV_SENSING_ANODE_ELECTRODE_1: NORMAL
MDC_IDC_SET_LEADCHNL_RV_SENSING_ANODE_LOCATION_1: NORMAL
MDC_IDC_SET_LEADCHNL_RV_SENSING_CATHODE_ELECTRODE_1: NORMAL
MDC_IDC_SET_LEADCHNL_RV_SENSING_CATHODE_LOCATION_1: NORMAL
MDC_IDC_SET_LEADCHNL_RV_SENSING_POLARITY: NORMAL
MDC_IDC_SET_LEADCHNL_RV_SENSING_SENSITIVITY: 0.6 MV
MDC_IDC_SET_ZONE_DETECTION_INTERVAL: 240 MS
MDC_IDC_SET_ZONE_DETECTION_INTERVAL: 300 MS
MDC_IDC_SET_ZONE_DETECTION_INTERVAL: 400 MS
MDC_IDC_SET_ZONE_TYPE: NORMAL
MDC_IDC_SET_ZONE_VENDOR_TYPE: NORMAL
MDC_IDC_STAT_BRADY_DTM_END: NORMAL
MDC_IDC_STAT_BRADY_DTM_START: NORMAL
MDC_IDC_STAT_BRADY_RV_PERCENT_PACED: 1 %
MDC_IDC_STAT_EPISODE_RECENT_COUNT: 1
MDC_IDC_STAT_EPISODE_RECENT_COUNT: 1
MDC_IDC_STAT_EPISODE_RECENT_COUNT: 271
MDC_IDC_STAT_EPISODE_RECENT_COUNT_DTM_END: NORMAL
MDC_IDC_STAT_EPISODE_RECENT_COUNT_DTM_START: NORMAL
MDC_IDC_STAT_EPISODE_TOTAL_COUNT: 1
MDC_IDC_STAT_EPISODE_TOTAL_COUNT: 1
MDC_IDC_STAT_EPISODE_TOTAL_COUNT: 469
MDC_IDC_STAT_EPISODE_TOTAL_COUNT_DTM_END: NORMAL
MDC_IDC_STAT_EPISODE_TYPE: NORMAL
MDC_IDC_STAT_EPISODE_VENDOR_TYPE: NORMAL
MDC_IDC_STAT_TACHYTHERAPY_ATP_DELIVERED_RECENT: 0
MDC_IDC_STAT_TACHYTHERAPY_ATP_DELIVERED_TOTAL: 0
MDC_IDC_STAT_TACHYTHERAPY_RECENT_DTM_END: NORMAL
MDC_IDC_STAT_TACHYTHERAPY_RECENT_DTM_START: NORMAL
MDC_IDC_STAT_TACHYTHERAPY_SHOCKS_ABORTED_RECENT: 0
MDC_IDC_STAT_TACHYTHERAPY_SHOCKS_ABORTED_TOTAL: 0
MDC_IDC_STAT_TACHYTHERAPY_SHOCKS_DELIVERED_RECENT: 0
MDC_IDC_STAT_TACHYTHERAPY_SHOCKS_DELIVERED_TOTAL: 0
MDC_IDC_STAT_TACHYTHERAPY_TOTAL_DTM_END: NORMAL

## 2019-12-03 ENCOUNTER — OFFICE VISIT (OUTPATIENT)
Dept: FAMILY MEDICINE | Facility: CLINIC | Age: 64
End: 2019-12-03
Payer: COMMERCIAL

## 2019-12-03 VITALS
SYSTOLIC BLOOD PRESSURE: 106 MMHG | TEMPERATURE: 97.7 F | HEIGHT: 64 IN | WEIGHT: 201 LBS | HEART RATE: 92 BPM | BODY MASS INDEX: 34.31 KG/M2 | OXYGEN SATURATION: 98 % | DIASTOLIC BLOOD PRESSURE: 70 MMHG

## 2019-12-03 DIAGNOSIS — I50.22 CHRONIC SYSTOLIC HEART FAILURE (H): ICD-10-CM

## 2019-12-03 DIAGNOSIS — I50.9 CHF (NYHA CLASS II, ACC/AHA STAGE C) (H): Chronic | ICD-10-CM

## 2019-12-03 DIAGNOSIS — N25.81 SECONDARY RENAL HYPERPARATHYROIDISM (H): ICD-10-CM

## 2019-12-03 DIAGNOSIS — I10 HYPERTENSION GOAL BP (BLOOD PRESSURE) < 140/90: Chronic | ICD-10-CM

## 2019-12-03 DIAGNOSIS — E11.3513 TYPE 2 DIABETES MELLITUS WITH PROLIFERATIVE RETINOPATHY OF BOTH EYES AND MACULAR EDEMA, UNSPECIFIED WHETHER LONG TERM INSULIN USE (H): ICD-10-CM

## 2019-12-03 DIAGNOSIS — M17.9 OSTEOARTHRITIS OF KNEE, UNSPECIFIED LATERALITY, UNSPECIFIED OSTEOARTHRITIS TYPE: Chronic | ICD-10-CM

## 2019-12-03 DIAGNOSIS — N18.30 CKD (CHRONIC KIDNEY DISEASE) STAGE 3, GFR 30-59 ML/MIN (H): Chronic | ICD-10-CM

## 2019-12-03 DIAGNOSIS — H93.12 TINNITUS OF LEFT EAR: Chronic | ICD-10-CM

## 2019-12-03 DIAGNOSIS — Z95.810 AUTOMATIC IMPLANTABLE CARDIOVERTER-DEFIBRILLATOR IN SITU: Chronic | ICD-10-CM

## 2019-12-03 DIAGNOSIS — I10 ESSENTIAL HYPERTENSION: ICD-10-CM

## 2019-12-03 DIAGNOSIS — H26.9 CATARACT OF LEFT EYE, UNSPECIFIED CATARACT TYPE: ICD-10-CM

## 2019-12-03 DIAGNOSIS — Z01.818 PREOP GENERAL PHYSICAL EXAM: Primary | ICD-10-CM

## 2019-12-03 DIAGNOSIS — H26.9 NUCLEAR CATARACT, NONSENILE: ICD-10-CM

## 2019-12-03 DIAGNOSIS — I50.9 CONGESTIVE HEART FAILURE, UNSPECIFIED HF CHRONICITY, UNSPECIFIED HEART FAILURE TYPE (H): ICD-10-CM

## 2019-12-03 PROCEDURE — 99214 OFFICE O/P EST MOD 30 MIN: CPT | Performed by: FAMILY MEDICINE

## 2019-12-03 ASSESSMENT — MIFFLIN-ST. JEOR: SCORE: 1605.48

## 2019-12-03 ASSESSMENT — PAIN SCALES - GENERAL: PAINLEVEL: NO PAIN (0)

## 2019-12-03 NOTE — PATIENT INSTRUCTIONS
Before Your Surgery      Call your surgeon if there is any change in your health. This includes signs of a cold or flu (such as a sore throat, runny nose, cough, rash or fever).    Do not smoke, drink alcohol or take over the counter medicine (unless your surgeon or primary care doctor tells you to) for the 24 hours before and after surgery.    If you take prescribed drugs: Follow your doctor s orders about which medicines to take and which to stop until after surgery.    Eating and drinking prior to surgery: follow the instructions from your surgeon    Take a shower or bath the night before surgery. Use the soap your surgeon gave you to gently clean your skin. If you do not have soap from your surgeon, use your regular soap. Do not shave or scrub the surgery site.  Wear clean pajamas and have clean sheets on your bed.     Hold Amaryle ( Glimeiride ) morning of surgery  Take 20 units  Of Lantus the AM of surgery.

## 2019-12-03 NOTE — PROGRESS NOTES
[address] [id number] [phone]  Dept: [phone]    PRE-OP EVALUATION:  Today's date: 12/3/2019    Lucian Oliveira Santiago Marks (: 1955) presents for pre-operative evaluation assessment as requested by Dr. Bunch.  He requires evaluation and anesthesia risk assessment prior to undergoing surgery/procedure for treatment of left eye .    Proposed Surgery/ Procedure:   Date of Surgery/ Procedure: 12/10/2019  Time of Surgery/ Procedure: Unknown  [facility]   Fax number for surgical facility: [phone]  Primary Physician: Suyapa Hatfield  Type of Anesthesia Anticipated: to be determined    Patient has a Health Care Directive or Living Will:  NO    1. YES - Do you have a history of heart attack, stroke, stent, bypass or surgery on an artery in the head, neck, heart or legs?  2. NO - Do you ever have any pain or discomfort in your chest?  3. NO - Do you have a history of  Heart Failure?  4. NO - Are you troubled by shortness of breath when: walking on the level, up a slight hill or at night?  5. NO - Do you currently have a cold, bronchitis or other respiratory infection?  6. NO - Do you have a cough, shortness of breath or wheezing?  7. NO - Do you sometimes get pains in the calves of your legs when you walk?  8. NO - Do you or anyone in your family have previous history of blood clots?  9. NO - Do you or does anyone in your family have a serious bleeding problem such as prolonged bleeding following surgeries or cuts?  10. NO - Have you ever had problems with anemia or been told to take iron pills?  11. NO - Have you had any abnormal blood loss such as black, tarry or bloody stools, or abnormal vaginal bleeding?  12. NO - Have you ever had a blood transfusion?  13. NO - Have you or any of your relatives ever had problems with anesthesia?  14. NO - Do you have sleep apnea, excessive snoring or daytime  drowsiness?  15. NO - Do you have any prosthetic heart valves?  16. NO - Do you have prosthetic joints?  17. NO - Is there any chance that you may be pregnant?      HPI:     HPI related to upcoming procedure: Patient is scheduled to have a left eye cataract surgery.  He does have a complex medical history, which include diabetes, ischemic cardiomyopathy.  Chronic kidney disease.  Hypertension.  Patient reports all his medical problems been stable.  He does follow-up with cardiology  Regular basis.  He denies any recent sickness.  He reports weight is normal, stable.  Denies chest pain, denies shortness of breath.      See problem list for active medical problems.  Problems all longstanding and stable, except as noted/documented.  See ROS for pertinent symptoms related to these conditions.      MEDICAL HISTORY:        Past Medical History:   Diagnosis Date     CAD (coronary artery disease)      CHF (congestive heart failure) (H)      CKD (chronic kidney disease), stage III (H)      Cortical cataract of both eyes      Diabetes (H)      Hyperlipidemia      Hypertension      Ischemic cardiomyopathy      Obesity      CHANTEL (obstructive sleep apnea)     occas cpap     Osteoarthritis      Past Surgical History:   Procedure Laterality Date     C CABG, ARTERY-VEIN, THREE  02/2008     COLONOSCOPY N/A 8/7/2019    Procedure: COLONOSCOPY, WITH POLYPECTOMY AND BIOPSY;  Surgeon: Chauncey Morataya MD;  Location:  GI     CV RIGHT HEART CATH N/A 3/25/2019    Procedure: CV RIGHT HEART CATH;  Surgeon: Moises Santos MD;  Location:  HEART CARDIAC CATH LAB     CV RIGHT HEART CATH N/A 7/10/2019    Procedure: CV RIGHT HEART CATH;  Surgeon: Jak Mccabe MD;  Location:  HEART CARDIAC CATH LAB     IMPLANT AUTOMATIC IMPLANTABLE CARDIOVERTER DEFIBRILLATOR       Current Outpatient Medications   Medication Sig Dispense Refill     aspirin 81 MG tablet Take 1 tablet (81 mg) by mouth daily 30 tablet 11     atorvastatin (LIPITOR) 40 MG  tablet Take 1 tablet (40 mg) by mouth daily 90 tablet 2     blood glucose (ACCU-CHEK SMARTVIEW) test strip USE TO TEST THREE TIMES DAILY AS DIRECTED 100 strip 0     blood glucose (NO BRAND SPECIFIED) test strip Use to test blood sugar 2 times daily or as directed. 200 strip 3     blood glucose monitoring (ACCU-CHEK FELIPE SMARTVIEW) meter device kit Use to test blood sugar 3-4 times daily, as directed. 1 kit 0     blood glucose monitoring (ONE TOUCH DELICA) lancets Use to test blood sugars 2 times daily. 200 each 3     clopidogrel (PLAVIX) 75 MG tablet Take 1 tablet (75 mg) by mouth daily 90 tablet 3     exenatide ER (BYDUREON) 2 MG pen Inject 2 mg Subcutaneous every 7 days 4 each 3     furosemide (LASIX) 20 MG tablet Take 2 tablets (40 mg) by mouth 2 times daily 180 tablet 3     glimepiride (AMARYL) 4 MG tablet Take 1 tablet (4 mg) by mouth every morning (before breakfast) 90 tablet 3     hydrALAZINE (APRESOLINE) 25 MG tablet Take 1 tablet (25 mg) by mouth 3 times daily 270 tablet 3     insulin glargine (LANTUS SOLOSTAR) 100 UNIT/ML pen Take 8 units in the morning and 40 units in the evening 45 mL 3     insulin pen needle (B-D U/F) 31G X 5 MM miscellaneous 1 Units by Device route 2 times daily Use 2 pen needles daily or as directed. 100 each 1     isosorbide mononitrate (IMDUR) 60 MG 24 hr tablet Take 1 tablet (60 mg) by mouth daily 90 tablet 3     losartan (COZAAR) 50 MG tablet Take 1 tablet (50 mg) by mouth daily 90 tablet 3     neomycin-polymyxin-dexamethasone (MAXITROL) 3.5-11197-9.1 ophthalmic ointment Place 0.5 inches into both eyes At Bedtime 1 Tube 3     nitroglycerin (NITROSTAT) 0.4 MG SL tablet Place 1 tablet (0.4 mg) under the tongue every 5 minutes as needed for chest pain If you are still having symptoms after 3 doses (15 minutes) call 911. 10 tablet 0     order for DME Auto CPAP 8-15 cmH20 1 Units 0     OTC products: None, except as noted above    Allergies   Allergen Reactions     No Known Drug  "Allergies       Latex Allergy: NO    Social History     Tobacco Use     Smoking status: Never Smoker     Smokeless tobacco: Never Used     Tobacco comment: Never smoked; non-smoking household   Substance Use Topics     Alcohol use: No     Alcohol/week: 0.0 standard drinks     History   Drug Use No       REVIEW OF SYSTEMS:   Constitutional, neuro, ENT, endocrine, pulmonary, cardiac, gastrointestinal, genitourinary, musculoskeletal, integument and psychiatric systems are negative, except as otherwise noted.    EXAM:   /70 (BP Location: Left arm, Patient Position: Chair, Cuff Size: Adult Large)   Pulse 92   Temp 97.7  F (36.5  C) (Oral)   Ht 1.63 m (5' 4.17\")   Wt 91.2 kg (201 lb)   SpO2 98%   BMI 34.32 kg/m      GENERAL APPEARANCE: healthy, alert and no distress     HENT: ear canals and TM's normal and nose and mouth without ulcers or lesions     NECK: no adenopathy, no asymmetry, masses, or scars and thyroid normal to palpation     RESP: lungs clear to auscultation - no rales, rhonchi or wheezes     CV: regular rates and rhythm, normal S1 S2, no S3 or S4 and no murmur, click or rub     ABDOMEN:  soft, nontender, no HSM or masses and bowel sounds normal     MS: 1+ edema to the bilaterally     SKIN: no suspicious lesions or rashes     NEURO: Normal strength and tone, sensory exam grossly normal, mentation intact and speech normal     PSYCH: mentation appears normal. and affect normal/bright     LYMPHATICS: No cervical adenopathy    DIAGNOSTICS:   No labs or EKG required for low risk surgery (cataract, skin procedure, breast biopsy, etc)    Recent Labs   Lab Test 11/25/19  1035 08/26/19  1424 07/08/19  1021  03/25/19  0840 03/11/19  1550   HGB 13.4 13.1* 13.4   < > 13.7 13.6    200 235   < > 234 206   INR  --   --  1.05  --  1.04  --     136 138   < > 136 134   POTASSIUM 4.8 4.4 4.7   < > 4.8 4.9   CR 2.12* 1.81* 2.08*   < > 2.38* 2.17*   A1C  --   --  7.9*  --   --  8.5*    < > = values in this " interval not displayed.        IMPRESSION:   Reason for surgery/procedure: left eye Cataract surgery  Diagnosis/reason for consult: left eye cataract    The proposed surgical procedure is considered LOW risk.    REVISED CARDIAC RISK INDEX  The patient has the following serious cardiovascular risks for perioperative complications such as (MI, PE, VFib and 3  AV Block):  High risk surgery (>5% cardiac complication risk)  Coronary Artery Disease (MI, positive stress test, angina, Qs on EKG)  Congestive Heart Failure (pulmonary edema, PND, s3 adia, CXR with pulmonary congestion, basilar rales)  Diabetes Mellitus (on Insulin)  Serum Creatinine >2.0 mg/dl  INTERPRETATION: 2 risks: Class III (moderate risk - 6.6% complication rate)    The patient has the following additional risks for perioperative complications:  No identified additional risks  Morbid obesity     Diagnosis Comments   1. Preop general physical exam     2. Cataract of left eye, unspecified cataract type     3. 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 Chronic systolic heart failure (H)     5. Tinnitus of left ear     6. Secondary renal hyperparathyroidism (H)     7. Nuclear cataract, nonsenile     8. Osteoarthritis of knee, unspecified laterality, unspecified osteoarthritis type     9. Hypertension goal BP (blood pressure) < 140/90     10. Essential hypertension     11. CHF (NYHA class II, ACC/AHA stage C) (H)     12. Congestive heart failure, unspecified HF chronicity, unspecified heart failure type (H)     13. Automatic implantable cardioverter-defibrillator - [employer] single lead ICD, Not Dependent     14. CKD (chronic kidney disease) stage 3, GFR 30-59 ml/min (H)           RECOMMENDATIONS:     Patient Instructions     Before Your Surgery      Call your surgeon if there is any change in your health. This includes signs of a cold or flu (such as a  sore throat, runny nose, cough, rash or fever).    Do not smoke, drink alcohol or take over the counter medicine (unless your surgeon or primary care doctor tells you to) for the 24 hours before and after surgery.    If you take prescribed drugs: Follow your doctor s orders about which medicines to take and which to stop until after surgery.    Eating and drinking prior to surgery: follow the instructions from your surgeon    Take a shower or bath the night before surgery. Use the soap your surgeon gave you to gently clean your skin. If you do not have soap from your surgeon, use your regular soap. Do not shave or scrub the surgery site.  Wear clean pajamas and have clean sheets on your bed.     Hold Amaryle ( Glimeiride ) morning of surgery  Take 20 units  Of Lantus the AM of surgery.      --Patient is to take all scheduled medications on the day of surgery EXCEPT for modifications listed below.    APPROVAL GIVEN to proceed with proposed procedure, without further diagnostic evaluation       Signed Electronically by: Edita Espitia MD    Copy of this evaluation report is provided to requesting physician.    [facility] Preop Guidelines    Revised Cardiac Risk Index

## 2019-12-05 ENCOUNTER — TELEPHONE (OUTPATIENT)
Dept: OPHTHALMOLOGY | Facility: CLINIC | Age: 64
End: 2019-12-05

## 2019-12-05 ENCOUNTER — TELEPHONE (OUTPATIENT)
Dept: TRANSPLANT | Facility: CLINIC | Age: 64
End: 2019-12-05

## 2019-12-05 ENCOUNTER — PREP FOR PROCEDURE (OUTPATIENT)
Dept: OPHTHALMOLOGY | Facility: CLINIC | Age: 64
End: 2019-12-05

## 2019-12-05 NOTE — TELEPHONE ENCOUNTER
This writer called the patient through  services to schedule procedure with Dr. Triny Bunch.    Spoke with: Lucian    Date of Surgery: 12/10/2019    [facility]  [address] [place] [address]    Informed patient they will need an adult  YES    Pre-op with surgeon (if applicable): Not required    H&P: Completed per patient    Additional imaging/appointments: Not required    Surgery packet: Mailed 12/05/2019    Additional comments: Day 1 post op 12/11/2019 at 12:50 PM, Week 1 12/18/2019 at 10:00 am

## 2019-12-05 NOTE — TELEPHONE ENCOUNTER
"Cancel tests, admission:  Last week, Lucian saw Dr Sheridan in clinic.  At the time, he had not yet scheduled a follow-up dental appt, which is needed before he can be listed for a heart transplant.   The decision was made to bring the pt back next week for a CPEST on 12/10, followed by a RHC on 12/11 and admit for a trial of inotropes.  Today, we got the message that Lucian wants to cancels those tests and his admission, stating that he is scheduled to have a cataract removed on that date.    I called Lucian back with the assistance of a , to pick a new date for his tests and admission.  He explained that he was told he would need to \"take it easy\" for a couple of weeks after his cataract surgery, so he was unable to decide  when to re-schedule.  When I asked Lucian for a date that would be good for him, his response was,\" I need more time to think about my options.\"  I reassured the pt that we don't want to push him to do something that he doesn't want, and that it's ok if he chooses not to move forward with transplant, but he just needs to let us know.  He was grateful for cancelling his appts, and told me he'd call me back when he decides he's ready to proceed.  Dr Sheridan and his clinic nurse, Meenu, informed of above.  "

## 2019-12-09 ENCOUNTER — ANESTHESIA EVENT (OUTPATIENT)
Dept: SURGERY | Facility: AMBULATORY SURGERY CENTER | Age: 64
End: 2019-12-09

## 2019-12-10 ENCOUNTER — HOSPITAL ENCOUNTER (OUTPATIENT)
Facility: AMBULATORY SURGERY CENTER | Age: 64
End: 2019-12-10
Attending: OPHTHALMOLOGY
Payer: COMMERCIAL

## 2019-12-10 ENCOUNTER — OFFICE VISIT (OUTPATIENT)
Dept: INTERPRETER SERVICES | Facility: CLINIC | Age: 64
End: 2019-12-10

## 2019-12-10 ENCOUNTER — ANESTHESIA (OUTPATIENT)
Dept: SURGERY | Facility: AMBULATORY SURGERY CENTER | Age: 64
End: 2019-12-10

## 2019-12-10 VITALS
DIASTOLIC BLOOD PRESSURE: 70 MMHG | HEART RATE: 96 BPM | RESPIRATION RATE: 16 BRPM | TEMPERATURE: 97.5 F | BODY MASS INDEX: 35.17 KG/M2 | OXYGEN SATURATION: 96 % | WEIGHT: 206 LBS | SYSTOLIC BLOOD PRESSURE: 107 MMHG | HEIGHT: 64 IN

## 2019-12-10 DIAGNOSIS — H26.9 NUCLEAR CATARACT, NONSENILE: ICD-10-CM

## 2019-12-10 DIAGNOSIS — Z48.810 AFTERCARE FOLLOWING SURGERY OF A SENSE ORGAN: Primary | ICD-10-CM

## 2019-12-10 DIAGNOSIS — E11.3513 TYPE 2 DIABETES MELLITUS WITH PROLIFERATIVE RETINOPATHY OF BOTH EYES AND MACULAR EDEMA, UNSPECIFIED WHETHER LONG TERM INSULIN USE (H): ICD-10-CM

## 2019-12-10 LAB
GLUCOSE BLDC GLUCOMTR-MCNC: 102 MG/DL (ref 70–99)
GLUCOSE BLDC GLUCOMTR-MCNC: 134 MG/DL (ref 70–99)

## 2019-12-10 DEVICE — EYE IMP IOL ALCON PCL SN60WF ACRYSOF IQ 19.0: Type: IMPLANTABLE DEVICE | Site: EYE | Status: FUNCTIONAL

## 2019-12-10 RX ORDER — FENTANYL CITRATE 50 UG/ML
25-50 INJECTION, SOLUTION INTRAMUSCULAR; INTRAVENOUS
Status: DISCONTINUED | OUTPATIENT
Start: 2019-12-10 | End: 2019-12-10 | Stop reason: HOSPADM

## 2019-12-10 RX ORDER — DEXTROSE MONOHYDRATE 25 G/50ML
INJECTION, SOLUTION INTRAVENOUS PRN
Status: DISCONTINUED | OUTPATIENT
Start: 2019-12-10 | End: 2019-12-10 | Stop reason: HOSPADM

## 2019-12-10 RX ORDER — PROPARACAINE HYDROCHLORIDE 5 MG/ML
1 SOLUTION/ DROPS OPHTHALMIC ONCE
Status: COMPLETED | OUTPATIENT
Start: 2019-12-10 | End: 2019-12-10

## 2019-12-10 RX ORDER — ONDANSETRON 2 MG/ML
4 INJECTION INTRAMUSCULAR; INTRAVENOUS EVERY 30 MIN PRN
Status: DISCONTINUED | OUTPATIENT
Start: 2019-12-10 | End: 2019-12-11 | Stop reason: HOSPADM

## 2019-12-10 RX ORDER — MEPERIDINE HYDROCHLORIDE 25 MG/ML
12.5 INJECTION INTRAMUSCULAR; INTRAVENOUS; SUBCUTANEOUS
Status: DISCONTINUED | OUTPATIENT
Start: 2019-12-10 | End: 2019-12-11 | Stop reason: HOSPADM

## 2019-12-10 RX ORDER — LIDOCAINE HYDROCHLORIDE 20 MG/ML
INJECTION, SOLUTION INFILTRATION; PERINEURAL PRN
Status: DISCONTINUED | OUTPATIENT
Start: 2019-12-10 | End: 2019-12-10

## 2019-12-10 RX ORDER — ACETAMINOPHEN 325 MG/1
975 TABLET ORAL ONCE
Status: COMPLETED | OUTPATIENT
Start: 2019-12-10 | End: 2019-12-10

## 2019-12-10 RX ORDER — MOXIFLOXACIN 5 MG/ML
1 SOLUTION/ DROPS OPHTHALMIC 4 TIMES DAILY
Qty: 3 ML | Refills: 0 | Status: SHIPPED | OUTPATIENT
Start: 2019-12-10 | End: 2020-02-06

## 2019-12-10 RX ORDER — DEXAMETHASONE SODIUM PHOSPHATE 4 MG/ML
INJECTION, SOLUTION INTRA-ARTICULAR; INTRALESIONAL; INTRAMUSCULAR; INTRAVENOUS; SOFT TISSUE PRN
Status: DISCONTINUED | OUTPATIENT
Start: 2019-12-10 | End: 2019-12-10 | Stop reason: HOSPADM

## 2019-12-10 RX ORDER — LIDOCAINE HYDROCHLORIDE 10 MG/ML
INJECTION, SOLUTION EPIDURAL; INFILTRATION; INTRACAUDAL; PERINEURAL PRN
Status: DISCONTINUED | OUTPATIENT
Start: 2019-12-10 | End: 2019-12-10 | Stop reason: HOSPADM

## 2019-12-10 RX ORDER — PHENYLEPHRINE HYDROCHLORIDE 25 MG/ML
1 SOLUTION/ DROPS OPHTHALMIC
Status: COMPLETED | OUTPATIENT
Start: 2019-12-10 | End: 2019-12-10

## 2019-12-10 RX ORDER — HYDROMORPHONE HYDROCHLORIDE 1 MG/ML
.3-.5 INJECTION, SOLUTION INTRAMUSCULAR; INTRAVENOUS; SUBCUTANEOUS EVERY 10 MIN PRN
Status: DISCONTINUED | OUTPATIENT
Start: 2019-12-10 | End: 2019-12-11 | Stop reason: HOSPADM

## 2019-12-10 RX ORDER — PROPOFOL 10 MG/ML
INJECTION, EMULSION INTRAVENOUS PRN
Status: DISCONTINUED | OUTPATIENT
Start: 2019-12-10 | End: 2019-12-10

## 2019-12-10 RX ORDER — PREDNISOLONE ACETATE 10 MG/ML
1 SUSPENSION/ DROPS OPHTHALMIC 4 TIMES DAILY
Qty: 5 ML | Refills: 0 | Status: SHIPPED | OUTPATIENT
Start: 2019-12-10 | End: 2019-12-18

## 2019-12-10 RX ORDER — SODIUM CHLORIDE, SODIUM LACTATE, POTASSIUM CHLORIDE, CALCIUM CHLORIDE 600; 310; 30; 20 MG/100ML; MG/100ML; MG/100ML; MG/100ML
INJECTION, SOLUTION INTRAVENOUS CONTINUOUS
Status: DISCONTINUED | OUTPATIENT
Start: 2019-12-10 | End: 2019-12-11 | Stop reason: HOSPADM

## 2019-12-10 RX ORDER — SODIUM CHLORIDE, SODIUM LACTATE, POTASSIUM CHLORIDE, CALCIUM CHLORIDE 600; 310; 30; 20 MG/100ML; MG/100ML; MG/100ML; MG/100ML
INJECTION, SOLUTION INTRAVENOUS CONTINUOUS
Status: DISCONTINUED | OUTPATIENT
Start: 2019-12-10 | End: 2019-12-10 | Stop reason: HOSPADM

## 2019-12-10 RX ORDER — KETOROLAC TROMETHAMINE 4 MG/ML
1 SOLUTION/ DROPS OPHTHALMIC 4 TIMES DAILY
Qty: 5 ML | Refills: 0 | Status: SHIPPED | OUTPATIENT
Start: 2019-12-10 | End: 2020-02-06

## 2019-12-10 RX ORDER — TETRACAINE HYDROCHLORIDE 5 MG/ML
SOLUTION OPHTHALMIC PRN
Status: DISCONTINUED | OUTPATIENT
Start: 2019-12-10 | End: 2019-12-10 | Stop reason: HOSPADM

## 2019-12-10 RX ORDER — NALOXONE HYDROCHLORIDE 0.4 MG/ML
.1-.4 INJECTION, SOLUTION INTRAMUSCULAR; INTRAVENOUS; SUBCUTANEOUS
Status: DISCONTINUED | OUTPATIENT
Start: 2019-12-10 | End: 2019-12-11 | Stop reason: HOSPADM

## 2019-12-10 RX ORDER — CYCLOPENTOLATE HYDROCHLORIDE 10 MG/ML
1 SOLUTION/ DROPS OPHTHALMIC
Status: COMPLETED | OUTPATIENT
Start: 2019-12-10 | End: 2019-12-10

## 2019-12-10 RX ORDER — LIDOCAINE 40 MG/G
CREAM TOPICAL
Status: DISCONTINUED | OUTPATIENT
Start: 2019-12-10 | End: 2019-12-10 | Stop reason: HOSPADM

## 2019-12-10 RX ORDER — BALANCED SALT SOLUTION 6.4; .75; .48; .3; 3.9; 1.7 MG/ML; MG/ML; MG/ML; MG/ML; MG/ML; MG/ML
SOLUTION OPHTHALMIC PRN
Status: DISCONTINUED | OUTPATIENT
Start: 2019-12-10 | End: 2019-12-10 | Stop reason: HOSPADM

## 2019-12-10 RX ORDER — ONDANSETRON 4 MG/1
4 TABLET, ORALLY DISINTEGRATING ORAL EVERY 30 MIN PRN
Status: DISCONTINUED | OUTPATIENT
Start: 2019-12-10 | End: 2019-12-11 | Stop reason: HOSPADM

## 2019-12-10 RX ORDER — ATROPINE SULFATE 10 MG/ML
SOLUTION/ DROPS OPHTHALMIC PRN
Status: DISCONTINUED | OUTPATIENT
Start: 2019-12-10 | End: 2019-12-10 | Stop reason: HOSPADM

## 2019-12-10 RX ORDER — OXYCODONE HYDROCHLORIDE 5 MG/1
5 TABLET ORAL EVERY 4 HOURS PRN
Status: DISCONTINUED | OUTPATIENT
Start: 2019-12-10 | End: 2019-12-11 | Stop reason: HOSPADM

## 2019-12-10 RX ADMIN — CYCLOPENTOLATE HYDROCHLORIDE 1 DROP: 10 SOLUTION/ DROPS OPHTHALMIC at 11:01

## 2019-12-10 RX ADMIN — PROPOFOL 20 MG: 10 INJECTION, EMULSION INTRAVENOUS at 11:51

## 2019-12-10 RX ADMIN — LIDOCAINE HYDROCHLORIDE 80 MG: 20 INJECTION, SOLUTION INFILTRATION; PERINEURAL at 11:50

## 2019-12-10 RX ADMIN — PHENYLEPHRINE HYDROCHLORIDE 1 DROP: 25 SOLUTION/ DROPS OPHTHALMIC at 10:56

## 2019-12-10 RX ADMIN — SODIUM CHLORIDE, SODIUM LACTATE, POTASSIUM CHLORIDE, CALCIUM CHLORIDE: 600; 310; 30; 20 INJECTION, SOLUTION INTRAVENOUS at 11:45

## 2019-12-10 RX ADMIN — ACETAMINOPHEN 975 MG: 325 TABLET ORAL at 10:45

## 2019-12-10 RX ADMIN — CYCLOPENTOLATE HYDROCHLORIDE 1 DROP: 10 SOLUTION/ DROPS OPHTHALMIC at 10:46

## 2019-12-10 RX ADMIN — CYCLOPENTOLATE HYDROCHLORIDE 1 DROP: 10 SOLUTION/ DROPS OPHTHALMIC at 10:56

## 2019-12-10 RX ADMIN — SODIUM CHLORIDE, SODIUM LACTATE, POTASSIUM CHLORIDE, CALCIUM CHLORIDE: 600; 310; 30; 20 INJECTION, SOLUTION INTRAVENOUS at 10:57

## 2019-12-10 RX ADMIN — PHENYLEPHRINE HYDROCHLORIDE 1 DROP: 25 SOLUTION/ DROPS OPHTHALMIC at 11:01

## 2019-12-10 RX ADMIN — PROPARACAINE HYDROCHLORIDE 1 DROP: 5 SOLUTION/ DROPS OPHTHALMIC at 10:46

## 2019-12-10 RX ADMIN — PHENYLEPHRINE HYDROCHLORIDE 1 DROP: 25 SOLUTION/ DROPS OPHTHALMIC at 10:46

## 2019-12-10 ASSESSMENT — MIFFLIN-ST. JEOR: SCORE: 1625.41

## 2019-12-10 NOTE — ANESTHESIA CARE TRANSFER NOTE
Patient: Lucian Henderson Sr.    Procedure(s):  PHACOEMULSIFICATION, CATARACT, CLEAR CORNEAL INCISION APPROACH, W STD INTRAOCULAR LENS IMPLANT INSERT + VITRECTOMY BY PARS PLANA  USING 25-GAUGE INSTRUMENTS. ENDOLASER, INFUSION OF 20% SF6 GAS    Diagnosis: 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, unspecified whether long term insulin use (H) [E11.3513]  Nuclear cataract, nonsenile [H26.9]  Diagnosis Additional Information: No value filed.    Anesthesia Type:   MAC     Note:  Airway :Room Air  Patient transferred to:Phase II  Comments: Uneventful transport to Phase II: VSS; IV patent; pt comfortable; Report given to RN; pt. Responds appropriately to commandsHandoff Report: Identifed the Patient, Identified the Reponsible Provider, Reviewed the pertinent medical history, Discussed the surgical course, Reviewed Intra-OP anesthesia mangement and issues during anesthesia, Set expectations for post-procedure period and Allowed opportunity for questions and acknowledgement of understanding      Vitals: (Last set prior to Anesthesia Care Transfer)    CRNA VITALS  12/10/2019 1315 - 12/10/2019 1345      12/10/2019             Pulse:  91    Ht Rate:  92    SpO2:  97 %    Resp Rate (set):  10    EKG:  Sinus rhythm     intermitent runs of 3- 5 beats of vtach                Electronically Signed By: ELIJAH Alexandra CRNA  December 10, 2019  1:45 PM

## 2019-12-10 NOTE — BRIEF OP NOTE
[facility] Brief Operative Note    Pre-operative diagnosis: Type 2 diabetes mellitus with proliferative retinopathy of both eyes, Tractional retinal detachment  and macular edema, unspecified whether long term insulin use (H) [E11.3513]  Nuclear cataract, nonsenile [H26.9]   Post-operative diagnosis same   Procedure: Procedure(s):  PHACOEMULSIFICATION, CATARACT, CLEAR CORNEAL INCISION APPROACH, W STD INTRAOCULAR LENS IMPLANT INSERT + VITRECTOMY BY PARS PLANA  USING 25-GAUGE INSTRUMENTS. ENDOLASER, INFUSION OF 20% SF6 GAS   Surgeon(s): Surgeon(s) and Role:     * Triny Bunch MD - Primary     * David Perez MD - Resident - Assisting     * Joe Guzman MD - Fellow - Assisting   Estimated blood loss: * No values recorded between 12/10/2019 12:02 PM and 12/10/2019  1:41 PM *    Specimens: * No specimens in log *   Findings: Tractional retinal detachment; vitreous hemorrhage and cataract

## 2019-12-10 NOTE — OR NURSING
Pt. Discharged from OR with Gas Alert Band on Left Wrist    Eladia Ying RN on 12/10/2019 at 1:19 PM

## 2019-12-10 NOTE — OP NOTE
SURGEON:   JENNY MAURICIO MD  Assistant: Joe Kimble MD and Ronak Perez MD    PREOPERATIVE DIAGNOSIS:   1. visually significant nucleosclerotic cataract left eye    2. Proliferative diabetic retinopathy, non clearing vitreous hemorrhage and Tractional retinal detachment left eye    POSTOPERATIVE DIAGNOSIS: same  NAME OF THE PROCEDURE:   1. phacoemulsification w/ intraocular lens implantation [id number]. 19.0 diopters, serial N  [id number]  2.25 gauge Pars plana vitrectomy (PPV), endolaser, membrane peel, air fluid exchange, SF6 20% left eye    ANESTHESIA: Monitored anesthesia care and peribulbar block left eye   COMPLICATIONS: none  INDICATIONS: Lucian Henderson Sr. is a 64 year old patient with diagnosis of visually significant cataract, proliferative diabetic retinopathy Tractional retinal detachment and vitreous hemorrhage. The patient is here for surgical repair.      DESCRIPTION OF THE PROCEDURE:  The patient was taken to the operative holding area where intravenous sedation was administered and a peribulbar block consisting of a 1:1 mixture of 2%lidocaine and 0.75% marcaine with epinephrine and wydase, was administered to the operative eye with adequate anesthesia and akinesia.    The patient was then brought into the operating room where the operative eye was prepped and draped in the usual sterile surgical fashion for ophthalmic surgery, including the installation of one drop of 5% Povidone Iodine.  A sterile drape was placed over the face and body and a lid speculum was inserted.      With the use of a Supersharp blade and 0.12 forceps, a paracentesis was created at the 2 o'clock position, and tripan blue was used to stain the anterior capsule. Next, the anterior chamber was irrigated and viscoelastic was injected into the anterior chamber.  A 2.5 mm keratome was then used to construct a clear corneal incision at the 10 o'clock position. Next, Using Utrata forceps and cystotome needle, a  continuous curvilinear capsulorrhexis was created and hydrodissection was undertaken with the use of BSS.  The nucleus was found to be freely mobile and then removed by phacoemulsification using a stop and chop technique.  The remaining elements of cortex were then removed with irrigation/aspiration.  The IOL  was injected into the capsular bag and was rotated into a good position with a Sinskey hook. The remaining elements of viscoelastic were then removed with irrigation/aspiration one 10-0 nylon suture was placed  at the wound incision.    A 25 gauge infusion cannula was placed 3.5 mm posterior to the limbus inferotemporally. The infusion cannula was connected and its intravitreal location was verified by direct visualization. The infusion was turned on.   Two other 25 gauge trocars were placed 3.5 mm posterior to the limbus at 10:00 o'clock and 2:00 o'clock position. The vitrectomy handpiece and endoilluminator were placed in the eye.  A vitrectomy was performed.  Upon entering the eye, it was noted the pt has diffuse vitreous hemorrhage, there were neovascularization elsewhere, a superior and nasal Tractional retinal detachment. the optic nerve was slightly pale and the vessels were very attenuated. There were prior Panretinal laser photocoagulation (PRP) laser scars.  A vitrectomy was performed. A posterior hyaloid peel was performed. Using maxgrip forceps, the Epiretinal membrane was removed. Next, Endolaser was applied 360 laser filling. Air fluid exchange was performed. Air gas exchange was performed and SF6 was placed.   The trocars were removed. The sclerotomies were closed with 6-0 plain suture. Subconjunctival injection of Dexamethasone and Ancef were administered.    The lid speculum was removed.  The eye was cleaned with wet and dry gauze. Maxitrol ointment was placed on the eye.  A patch and Silveira shield were placed over the eye.     .  Implant Name Type Inv. Item Serial No.  Lot No. LRB  No. Used   EYE IMP IOL OTILIA PCL [id number] ACRYSOF IQ 19.0 Lens/Eye Implant EYE IMP IOL OTILIA PCL [id number] ACRYSOF IQ 19.0 [id number] [employer] [facility]  Left 1         The surgery was assisted by Dr. Joe Kimble and Dr. Ronak Perez. Due to the delicate and complex nature of this surgery, Dr. Joe Kimble was required. Dr. Joe Kimble assisted with vitrectomy. Dr. Ronak Perez assisted with Cataract extraction. I was present for the entire surgery.

## 2019-12-10 NOTE — DISCHARGE INSTRUCTIONS
[facility]  [phone]  Post Operative Eye Surgery Instructions    Triny Bunch MD  Will I have pain?  Some discomfort is normal and expected following surgery. The first few days after surgery you may need to use prescription pain pills. Taking Tylenol (acetaminophen) regularly may help prevent pain.  Discomfort should gradually decrease and Tylenol should be sufficient to relieve pain. A foreign body sensation in the cornea of the eye is very common and caused by sutures placed at surgery. These sutures will go away in one to two weeks. If the pain worsens, you should call the doctor.  Do I need to wear an eye patch?  You do not need to wear an eye patch at home after the doctor has removed the patch on your first day after surgery. However, you may be more comfortable wearing a patch outside in the sun, when sleeping or napping, or in a carson, windy environment.  How much drainage should I have?  You may expect a moderate amount of drainage for a week. Gradually the drainage should decrease. The lids can be cleaned with a clean washcloth and gentle soap or diluted baby shampoo. Wipe the eyelids gently from the nose outward. Some blood in the tears is a normal finding.  Will there be swelling?  Some swelling is normal for about a week or two after which it will gradually decrease. Applying a cool compress, using a clean washcloth for 5 - 10 minutes several times a day may reduce the swelling and make you more comfortable. People may have some swelling of both eyes, especially if face down positioning is required. The white part of the eye may appear very red or bloody for a week or two. This may get worse a few days after surgery. Though the bright red appearance can look frightening, it is a normal finding early after surgery and will resolve in a few weeks.  Will I need to use eye drops?  You will be using several different kinds of eye drops or ointment (salve) when you leave the hospital.  The directions will be on each bottle or tube. The medication with the red top will keep your eye dilated and may make your eye more sensitive to light. Wearing sunglasses may help. The other medication is a combination antibiotic/steroid to prevent infection and promote healing.  Occasionally a third drop is used to control the pressure in your eye. A new bottle of artificial tears or lubricant ointment may be used along with your prescription eye drops after surgery. You will be using drops from four to eight weeks. Bring all eye medications (drops. ointments, or pills) with you  to each visit.   Always wash your hands before putting in the eye drops. You may wish to have someone else help you. Pull down on the lower lid and squeeze one drop from the bottle being careful not to touch the dropper to your eye or eyelid. One drop is sufficient, but another may be used if the first did not go into the eye. It is often easier to put in the drops if you are reclining or lying down. Wait one to three minutes after the first drop before using the second drop to allow the medications to absorb into the eye.  How long will it take for my vision to improve?  Your vision should gradually improve, but it may take up to six months to regain your best vision. Frequently, air or gas bubbles are injected into the eye at the time of surgery. This will blur your vision significantly at first. As the bubble becomes smaller it will cause a black line in your vision that moves as you move your head. As the bubble becomes smaller you may notice that it looks more like a bubble or that it will break up into several smaller bubbles. It will take from a few days to a few weeks for the bubble to dissolve and be replaced by clear fluid.  You may notice floaters or double vision after your surgery. These symptoms usually will decrease with time. If the double vision is bothersome patching the eye may help.  If you notice a sudden worsening in  your vision call your doctor.  Are there any physical restrictions after surgery?  If an air or gas bubble was placed in the eye during surgery, you will be asked to spend most of your time (both awake and during the night) with your head in a specific position, frequently face down. As the eye heals and the bubble dissolves there will be less of a need for you to stay in that specific position. You should avoid sleeping on your back until the bubble has totally dissolved and you have been given permission from your surgeon. You should not fly in an airplane or go to high altitudes in the mountains while there is a bubble in your eye. If you should require any other surgery, under general anesthesia, while you still have an air bubble in your eye, have your surgeon or anesthetist contact us prior to your surgery. Some anesthetic agents can make the bubble expand and seriously damage your eye.  Patients that have a gas/air bubble placed in their eye will have a green medical alert band on their wrist.  This band should not be removed until the doctor removes it for you or gives you permission to remove it.     Heavy lifting (greater than 50 pounds), swimming and contact sports should be avoided for about 3 to 4 weeks after surgery.  You may resume your usual sexual activities about one week after surgery.  When may I return to work or my normal activities?  Depending on the type or work, you may return to work within a few days. If your work involves physical activity or driving, you will need to restrict your activities and remain home longer.  You may watch television, look at magazines, or work puzzles. Reading may be uncomfortable for several days, but using the eyes will not cause any damage.  You may go outside as usual. If conditions are windy or carson, wear an eye pad to avoid getting dust or dirt in the eye.  Can I travel?  You cannot fly in an airplane or drive into the mountains as long as the air or gas  bubble remains in your eye.    Are there any driving restrictions?  Someone will need to drive you home from the hospital. Generally driving can be resumed in several days if you have good vision in your other eye. If you do not feel comfortable driving, do not drive! Your depth perception will be decreased so you will want to try driving during the day in light traffic until you feel comfortable driving. You should restrict your driving while you are taking prescription pain pills as they also can affect your judgment.  When can I shower and wash my hair?  You may shower or bathe when you get home, but avoid getting water in your eye. You may want someone to help you shampoo your hair at first.  You may shave, brush your teeth, or comb your hair. Do not use make-up, mascara, or creams/lotions around your eye for several weeks.  When will I see the doctor again?  Generally, you will be seen the first day after surgery and again 1-2 weeks later. If you have not received a return appointment before leaving the hospital, you should call our office during the business hours to arrange an appointment. If you will be seeing your local doctor instead of us, you will need to call that office to set up an appointment.    If you have a green armband on, your physician will instruct you as to how long you will have to wear it at your post-operative follow-up appointment.  How do I reach a doctor if I have concerns?  One of our doctors is available by calling [facility] [phone]. Please try to call for routine questions and prescription refills during business hours.  You should call your doctor if:  You notice a sudden decrease in your vision.  Have severe pain or pain increases rather than subsiding.  You notice a new black curtain over your eye that is not the gas bubble. If you have any of these symptoms, you may need to be examined.    [facility]  Home Care  Following Anesthesia  For 24 hours after surgery:  1. Get plenty of rest.  A responsible adult must stay with you for at least 24 hours after you leave the surgery center.  2. Do not drive or use heavy equipment.  If you have weakness or tingling, don't drive or use heavy equipment until this feeling goes away.   3. Do not drink alcohol.   4. Avoid strenuous or risky activities.  Ask for help when climbing stairs.  5. You may feel lightheaded.  IF so, sit for a few minutes before standing.  Have someone help you get up.   6. If you have nausea (feel sick to your stomach): Drink only clear liquids such as apple juice, ginger ale, broth or 7-Up.  Rest may also help.  Be sure to drink enough fluids.  Move to a regular diet as you feel able.   7. You may have a slight fever.  Call the doctor if your fever is over 100 F (37.7 C) (taken under the tongue) or lasts longer than 24 hours.  8. You may have a dry mouth, a sore throat, muscle aches or trouble sleeping. These should go away after 24 hours.  9. Do not make important or legal decisions.               Tips for taking pain medications  To get the best pain relief possible, remember these points:    Take pain medications as directed, before pain becomes severe.    Pain medication can upset your stomach: taking it with food may help.    Constipation is a common side effect of pain medication. Drink plenty of  fluids.    Eat foods high in fiber. Take a stool softener if recommended by your doctor or pharmacist.    Do not drink alcohol, drive or operate machinery while taking pain medications.    Ask about other ways to control pain, such as with heat, ice or relaxation.    Tylenol/Acetaminophen Consumption  To help encourage the safe use of acetaminophen, the makers of TYLENOL  have lowered the maximum daily dose for single-ingredient Extra Strength TYLENOL  (acetaminophen) products sold in the [place] from 8 pills per day (4,000 mg) to 6 pills per day (3,000 mg). The  dosing interval has also changed from 2 pills every 4-6 hours to 2 pills every 6 hours.    If you feel your pain relief is insufficient, you may take Tylenol/Acetaminophen in addition to your narcotic pain medication.     Be careful not to exceed 3,000 mg of Tylenol/Acetaminophen in a 24 hour period from all sources.    If you are taking extra strength Tylenol/acetaminophen (500 mg), the maximum dose is 6 tablets in 24 hours.    If you are taking regular strength acetaminophen (325 mg), the maximum dose is 9 tablets in 24 hours.    You received 975 mg of Tylenol at 10:45 AM. Okay to take again at 04:45 PM, and follow dosing instructions on bottle.     Call a doctor for any of the followin. Signs of infection (fever, growing tenderness at the surgery site, a large amount of drainage or bleeding, severe pain, foul-smelling drainage, redness, swelling).  2. It has been over 8 to 10 hours since surgery and you are still not able to urinate (pass water).  3. Headache for over 24 hours.    Your doctor is:     Dr. Triny Bunch, Ophthalmology: [phone]               Or dial [phone] and ask for the resident on call for:  Ophthalmology  For emergency care, call the:  [facility]:  [phone] (TTY for hearing impaired: [phone])

## 2019-12-10 NOTE — ANESTHESIA POSTPROCEDURE EVALUATION
Anesthesia POST Procedure Evaluation    Patient: Lucian Henderson .   MRN:     [id number] Gender:   male   Age:    64 year old :      1955        Preoperative Diagnosis: 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, unspecified whether long term insulin use (H) [E11.3513]  Nuclear cataract, nonsenile [H26.9]   Procedure(s):  PHACOEMULSIFICATION, CATARACT, CLEAR CORNEAL INCISION APPROACH, W STD INTRAOCULAR LENS IMPLANT INSERT + VITRECTOMY BY PARS PLANA  USING 25-GAUGE INSTRUMENTS. ENDOLASER, INFUSION OF 20% SF6 GAS   Postop Comments: No value filed.       Anesthesia Type:  Not documented  MAC    Reportable Event: NO     PAIN: Uncomplicated   Sign Out status: Comfortable, Well controlled pain     PONV: No PONV   Sign Out status:  No Nausea or Vomiting     Neuro/Psych: Uneventful perioperative course   Sign Out Status: Preoperative baseline; Age appropriate mentation     Airway/Resp.: Uneventful perioperative course   Sign Out Status: Non labored breathing, age appropriate RR; Resp. Status within EXPECTED Parameters     CV: Uneventful perioperative course   Sign Out status: Appropriate BP and perfusion indices; Appropriate HR/Rhythm     Disposition:   Sign Out in:  Phase II  Disposition:  Home  Recovery Course: Uneventful  Follow-Up: Not required           Last Anesthesia Record Vitals:  CRNA VITALS  12/10/2019 1315 - 12/10/2019 1415      12/10/2019             Pulse:  91    Ht Rate:  92    SpO2:  97 %    Resp Rate (set):  10    EKG:  Sinus rhythm     intermitent runs of 3- 5 beats of vtach          Last PACU Vitals:  Vitals Value Taken Time   BP     Temp     Pulse     Resp     SpO2     Temp src     NIBP 117/86 12/10/2019  1:43 PM   Pulse 91 12/10/2019  1:45 PM   SpO2 97 % 12/10/2019  1:45 PM   Resp     Temp     Ht Rate 92 12/10/2019  1:45 PM   Temp 2           Electronically Signed By: Chauncey Pereira MD, December 10, 2019, 2:32 PM

## 2019-12-10 NOTE — ANESTHESIA PREPROCEDURE EVALUATION
Anesthesia Pre-Procedure Evaluation    Patient: Lucian Henderson .   MRN:     [id number] Gender:   male   Age:    64 year old :      1955        Preoperative Diagnosis: 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, unspecified whether long term insulin use (H) [E11.3513]  Nuclear cataract, nonsenile [H26.9]   Procedure(s):  PHACOEMULSIFICATION, CATARACT, CLEAR CORNEAL INCISION APPROACH, W STD INTRAOCULAR LENS IMPLANT INSERT + VITRECTOMY BY PARS PLANA APPR USING 25-GAUGE INSTRUMENTS     Past Medical History:   Diagnosis Date     CAD (coronary artery disease)      CHF (congestive heart failure) (H)      CKD (chronic kidney disease), stage III (H)      Cortical cataract of both eyes      Diabetes (H)      Hyperlipidemia      Hypertension      Ischemic cardiomyopathy      Obesity      CHANTEL (obstructive sleep apnea)     occas cpap     Osteoarthritis       Past Surgical History:   Procedure Laterality Date     C CABG, ARTERY-VEIN, THREE  2008     COLONOSCOPY N/A 2019    Procedure: COLONOSCOPY, WITH POLYPECTOMY AND BIOPSY;  Surgeon: Chauncey Morataya MD;  Location:  GI     CV RIGHT HEART CATH N/A 3/25/2019    Procedure: CV RIGHT HEART CATH;  Surgeon: Moises Santos MD;  Location:  HEART CARDIAC CATH LAB     CV RIGHT HEART CATH N/A 7/10/2019    Procedure: CV RIGHT HEART CATH;  Surgeon: Jak Mccabe MD;  Location:  HEART CARDIAC CATH LAB     IMPLANT AUTOMATIC IMPLANTABLE CARDIOVERTER DEFIBRILLATOR            Anesthesia Evaluation     .             ROS/MED HX    ENT/Pulmonary:     (+)sleep apnea, , . .    Neurologic:       Cardiovascular:     (+) hypertension--CAD, --. : . CHF Last EF: 10-20% . . :ICD . . pulmonary hypertension,       METS/Exercise Tolerance:     Hematologic:         Musculoskeletal:         GI/Hepatic:         Renal/Genitourinary:     (+) chronic renal disease, type: CRI,       Endo:     (+) type II DM Obesity, .     "  Psychiatric:         Infectious Disease:         Malignancy:         Other:                     JZG FV AN PHYSICAL EXAM    LABS:  CBC:   Lab Results   Component Value Date    WBC 6.5 11/25/2019    WBC 6.8 08/26/2019    HGB 13.4 11/25/2019    HGB 13.1 (L) 08/26/2019    HCT 42.1 11/25/2019    HCT 41.3 08/26/2019     11/25/2019     08/26/2019     BMP:   Lab Results   Component Value Date     11/25/2019     08/26/2019    POTASSIUM 4.8 11/25/2019    POTASSIUM 4.4 08/26/2019    CHLORIDE 105 11/25/2019    CHLORIDE 101 08/26/2019    CO2 27 11/25/2019    CO2 30 08/26/2019    BUN 31 (H) 11/25/2019    BUN 33 (H) 08/26/2019    CR 2.12 (H) 11/25/2019    CR 1.81 (H) 08/26/2019     (H) 11/25/2019     (H) 08/26/2019     COAGS:   Lab Results   Component Value Date    PTT 30 07/08/2019    INR 1.05 07/08/2019     POC:   Lab Results   Component Value Date     (H) 08/07/2019     OTHER:   Lab Results   Component Value Date    A1C 7.9 (H) 07/08/2019    BRYANNA 9.0 11/25/2019    PHOS 3.3 07/08/2019    MAG 2.1 07/08/2019    ALBUMIN 3.4 07/08/2019    PROTTOTAL 7.5 07/08/2019    ALT 20 07/08/2019    AST 14 07/08/2019    ALKPHOS 126 07/08/2019    BILITOTAL 0.6 07/08/2019    TSH 1.30 07/08/2019    CRP 9.9 07/08/2019        Preop Vitals    BP Readings from Last 3 Encounters:   12/10/19 131/88   12/03/19 106/70   11/25/19 116/78    Pulse Readings from Last 3 Encounters:   12/10/19 94   12/03/19 92   11/25/19 101      Resp Readings from Last 3 Encounters:   12/10/19 16   08/07/19 16   07/17/19 16    SpO2 Readings from Last 3 Encounters:   12/10/19 98%   12/03/19 98%   11/25/19 97%      Temp Readings from Last 1 Encounters:   12/10/19 36.8  C (98.2  F) (Oral)    Ht Readings from Last 1 Encounters:   12/10/19 1.626 m (5' 4\")      Wt Readings from Last 1 Encounters:   12/10/19 93.4 kg (206 lb)    Estimated body mass index is 35.36 kg/m  as calculated from the following:    Height as of this encounter: " "1.626 m (5' 4\").    Weight as of this encounter: 93.4 kg (206 lb).     LDA:  Peripheral IV 12/10/19 Right Wrist (Active)   Site Assessment WDL 12/10/2019 10:58 AM   Line Status Infusing 12/10/2019 10:58 AM   Phlebitis Scale 0-->no symptoms 12/10/2019 10:58 AM   Infiltration Scale 0 12/10/2019 10:58 AM   Number of days: 0        Assessment:   ASA SCORE: 3    H&P: History and physical reviewed and following examination; no interval change.   Smoking Status:  Non-Smoker/Unknown   NPO Status: NPO Appropriate     Plan:   Anes. Type:  MAC   Pre-Medication: None   Induction:  N/a   Airway: Native Airway   Access/Monitoring: PIV   Maintenance: N/a     Postop Plan:   Postop Pain: None  Postop Sedation/Airway: Not planned     PONV Management:   Adult Risk Factors:, Non-Smoker     CONSENT: Direct conversation   Plan and risks discussed with: Patient   Blood Products: Consent Deferred (Minimal Blood Loss)                   Chauncey Pereira MD  "

## 2019-12-11 ENCOUNTER — OFFICE VISIT (OUTPATIENT)
Dept: OPHTHALMOLOGY | Facility: CLINIC | Age: 64
End: 2019-12-11
Attending: OPHTHALMOLOGY
Payer: COMMERCIAL

## 2019-12-11 DIAGNOSIS — Z48.810 AFTERCARE FOLLOWING SURGERY OF A SENSORY ORGAN: Primary | ICD-10-CM

## 2019-12-11 PROCEDURE — T1013 SIGN LANG/ORAL INTERPRETER: HCPCS | Mod: U3,ZF

## 2019-12-11 PROCEDURE — G0463 HOSPITAL OUTPT CLINIC VISIT: HCPCS | Mod: ZF

## 2019-12-11 ASSESSMENT — CUP TO DISC RATIO
OS_RATIO: 0.2
OD_RATIO: 0.2

## 2019-12-11 ASSESSMENT — TONOMETRY
IOP_METHOD: TONOPEN
OS_IOP_MMHG: 22
OD_IOP_MMHG: 18

## 2019-12-11 ASSESSMENT — EXTERNAL EXAM - RIGHT EYE: OD_EXAM: NORMAL

## 2019-12-11 ASSESSMENT — SLIT LAMP EXAM - LIDS
COMMENTS: PTOSIS OD>OS
COMMENTS: PTOSIS OD>OS

## 2019-12-11 ASSESSMENT — VISUAL ACUITY
METHOD: SNELLEN - LINEAR
OD_SC: 20/40
OS_SC: 20/300

## 2019-12-11 ASSESSMENT — EXTERNAL EXAM - LEFT EYE: OS_EXAM: NORMAL

## 2019-12-11 NOTE — NURSING NOTE
Chief Complaints and History of Present Illnesses   Patient presents with     Post Op (Ophthalmology) Left Eye     Chief Complaint(s) and History of Present Illness(es)     Post Op (Ophthalmology) Left Eye     Laterality: left eye    Pain scale: 0/10              Comments     1 day post op CE/IOL Vit LE  Had OK night, don't like sleeping on side.  Slight pain last PM.  Didn't check blood sugar today.  Ryann NUNES 1:18 PM December 11, 2019

## 2019-12-11 NOTE — PROGRESS NOTES
Postoperative day 1 status post 12/10/19   1. phacoemulsification w/ intraocular lens implantation SN60WF. 19.0 diopters, serial N  [id number]  2.25 gauge Pars plana vitrectomy (PPV), endolaser, membrane peel, air fluid exchange, SF6 20% left eye      Slept well  Retina attached  Doing well    Plan:  Position: face down; R or L side down; prefer L side down  No aviation  No heavy lifting   Silveira shield at all times  Retina detachment and endophthalmitis precautions were discussed with the patient and was asked to return if any of the those occur    Medications to operative eye  Ketorolac (gray top) four times a day    Predforte  (white top) four times a day  (shake the bottle before)  moxifloxacin (tan top) four times a day    Put the eyedrops 5 minutes a part  Eye shield or glasses at all times x 3 weeks  Sleep with the shield  No heavy lifting   Follow-up in one week  Retina detachment and endophthalmitis precautions were discussed with the patient (increased blurry vision, drainage, new flashes, floaters or a curtain in the visual field) and was asked to return if any of the those occur    Follow up in one week  ~~~~~~~~~~~~~~~~~~~~~~~~~~~~~~~~~~   Complete documentation of historical and exam elements from today's encounter can be found in the full encounter summary report (not reduplicated in this progress note).  I personally obtained the chief complaint(s) and history of present illness.  I confirmed and edited as necessary the review of systems, past medical/surgical history, family history, social history, and examination findings as documented by others; and I examined the patient myself.  I personally reviewed the relevant tests, images, and reports as documented above.  I formulated and edited as necessary the assessment and plan and discussed the findings and management plan with the patient and family    Triny Bunch MD  .  Retina Service   Department of Ophthalmology and  Visual Neurosciences   [facility]  Phone: [phone]   Fax: [phone]

## 2019-12-18 ENCOUNTER — OFFICE VISIT (OUTPATIENT)
Dept: OPHTHALMOLOGY | Facility: CLINIC | Age: 64
End: 2019-12-18
Attending: OPHTHALMOLOGY
Payer: COMMERCIAL

## 2019-12-18 DIAGNOSIS — Z48.810 AFTERCARE FOLLOWING SURGERY OF A SENSE ORGAN: ICD-10-CM

## 2019-12-18 DIAGNOSIS — Z48.810 AFTERCARE FOLLOWING SURGERY OF A SENSORY ORGAN: Primary | ICD-10-CM

## 2019-12-18 PROCEDURE — G0463 HOSPITAL OUTPT CLINIC VISIT: HCPCS | Mod: ZF

## 2019-12-18 RX ORDER — PREDNISOLONE ACETATE 10 MG/ML
1 SUSPENSION/ DROPS OPHTHALMIC 3 TIMES DAILY
Qty: 5 ML | Refills: 3 | Status: SHIPPED | OUTPATIENT
Start: 2019-12-18 | End: 2020-02-06

## 2019-12-18 ASSESSMENT — SLIT LAMP EXAM - LIDS
COMMENTS: PTOSIS OD>OS
COMMENTS: PTOSIS OD>OS

## 2019-12-18 ASSESSMENT — CUP TO DISC RATIO
OD_RATIO: 0.2
OS_RATIO: 0.2

## 2019-12-18 ASSESSMENT — TONOMETRY
IOP_METHOD: TONOPEN
OS_IOP_MMHG: 09
OD_IOP_MMHG: 15

## 2019-12-18 ASSESSMENT — VISUAL ACUITY
OS_PH_SC: 20/400
OS_SC: CF @ 3'
OD_SC: 20/50
OD_PH_SC: 20/40
METHOD: SNELLEN - LINEAR
OD_SC+: -2

## 2019-12-18 ASSESSMENT — EXTERNAL EXAM - RIGHT EYE: OD_EXAM: NORMAL

## 2019-12-18 ASSESSMENT — EXTERNAL EXAM - LEFT EYE: OS_EXAM: NORMAL

## 2019-12-18 NOTE — PROGRESS NOTES
Postoperative day 8 status post 12/10/19   1. phacoemulsification w/ intraocular lens implantation SN60WF. 19.0 diopters, serial N  [id number]  2.25 gauge Pars plana vitrectomy (PPV), endolaser, membrane peel, air fluid exchange, SF6 20% left eye      Vision slowly improving, noticing gas bubble in vision. No pain or discomfort.  Retina attached  Doing well    Plan:  Position: face down; R or L side down; prefer L side down  No aviation  No heavy lifting   Silveira shield at all times  Retina detachment and endophthalmitis precautions were discussed with the patient and was asked to return if any of the those occur    Medications to operative eye  Ketorolac (gray top) Three times a day x 1 week, then twice a day x 1 week and then once a day till finish  Predforte (white top) Three times a day x 1 week, then twice a day x 1 week and then once a day till finish  Stop Moxifloxacin    Maxitrol ointment at bedtime until gone  Put the eyedrops 5 minutes a part  Eye shield or glasses at all times x 3 weeks  Sleep with the shield  No heavy lifting   Retina detachment and endophthalmitis precautions were discussed with the patient (increased blurry vision, drainage, new flashes, floaters or a curtain in the visual field) and was asked to return if any of the those occur    Follow up in 3 weeks with Optical Coherence Tomography   Possible avastin inj right eye     Ronak Perez MD  Ophthalmology Resident, PGY-3    ~~~~~~~~~~~~~~~~~~~~~~~~~~~~~~~~~~   Complete documentation of historical and exam elements from today's encounter can be found in the full encounter summary report (not reduplicated in this progress note).  I personally obtained the chief complaint(s) and history of present illness.  I confirmed and edited as necessary the review of systems, past medical/surgical history, family history, social history, and examination findings as documented by others; and I examined the patient myself.  I personally reviewed the  relevant tests, images, and reports as documented above.  I formulated and edited as necessary the assessment and plan and discussed the findings and management plan with the patient and family    Triny Bunch MD  .  Retina Service   Department of Ophthalmology and Visual Neurosciences   [facility]  Phone: [phone]   Fax: [phone]

## 2019-12-18 NOTE — PATIENT INSTRUCTIONS
Position: face down; R or L side down; prefer L side down  No aviation  No heavy lifting   Silveira shield at all times  Retina detachment and endophthalmitis precautions were discussed with the patient and was asked to return if any of the those occur    Medications to operative eye  Ketorolac (gray top) Three times a day x 1 week, then twice a day x 1 week and then once a day till finish  Predforte (white top) Three times a day x 1 week, then twice a day x 1 week and then once a day till finish  Stop Moxifloxacin    Maxitrol ointment at bedtime until gone  Put the eyedrops 5 minutes a part  Eye shield or glasses at all times x 3 weeks  Sleep with the shield  No heavy lifting   Retina detachment and endophthalmitis precautions were discussed with the patient (increased blurry vision, drainage, new flashes, floaters or a curtain in the visual field) and was asked to return if any of the those occur

## 2019-12-18 NOTE — NURSING NOTE
Chief Complaint(s) and History of Present Illness(es)     Post Op (Ophthalmology) Left Eye     In left eye.  Associated symptoms include Negative for dryness, eye pain, redness and tearing.              Comments     1 week post op CE/IOL and Vit LE (12/10/19). Pt notes he sees something floating in his vision of the LE like water. Vision seems a bit clearer in the LE and slowly clearing up.     Ocular meds: Ketorolac QID LE, Pred QID LE, Moxifloxacin QID LE, Maxitrol hay QID LE.     Theresa Murphy, PABLITO 9:42 AM December 18, 2019

## 2019-12-23 DIAGNOSIS — I10 HYPERTENSION GOAL BP (BLOOD PRESSURE) < 140/90: Chronic | ICD-10-CM

## 2019-12-27 RX ORDER — CARVEDILOL 25 MG/1
12.5 TABLET ORAL 2 TIMES DAILY WITH MEALS
Qty: 180 TABLET | Refills: 1 | OUTPATIENT
Start: 2019-12-27

## 2020-01-07 DIAGNOSIS — E11.3513 TYPE 2 DIABETES MELLITUS WITH PROLIFERATIVE RETINOPATHY OF BOTH EYES AND MACULAR EDEMA, UNSPECIFIED WHETHER LONG TERM INSULIN USE (H): Primary | ICD-10-CM

## 2020-01-09 ENCOUNTER — OFFICE VISIT (OUTPATIENT)
Dept: OPHTHALMOLOGY | Facility: CLINIC | Age: 65
End: 2020-01-09
Attending: OPHTHALMOLOGY
Payer: COMMERCIAL

## 2020-01-09 DIAGNOSIS — E11.3513 TYPE 2 DIABETES MELLITUS WITH PROLIFERATIVE RETINOPATHY OF BOTH EYES AND MACULAR EDEMA, UNSPECIFIED WHETHER LONG TERM INSULIN USE (H): ICD-10-CM

## 2020-01-09 DIAGNOSIS — Z48.810 AFTERCARE FOLLOWING SURGERY OF A SENSORY ORGAN: Primary | ICD-10-CM

## 2020-01-09 PROCEDURE — 67028 INJECTION EYE DRUG: CPT | Mod: RT,ZF | Performed by: OPHTHALMOLOGY

## 2020-01-09 PROCEDURE — G0463 HOSPITAL OUTPT CLINIC VISIT: HCPCS | Mod: ZF

## 2020-01-09 PROCEDURE — 92134 CPTRZ OPH DX IMG PST SGM RTA: CPT | Mod: ZF | Performed by: OPHTHALMOLOGY

## 2020-01-09 PROCEDURE — 25000128 H RX IP 250 OP 636: Mod: ZF

## 2020-01-09 PROCEDURE — 25800030 ZZH RX IP 258 OP 636: Mod: ZF

## 2020-01-09 ASSESSMENT — TONOMETRY
OD_IOP_MMHG: 13
IOP_METHOD: TONOPEN
OS_IOP_MMHG: 13

## 2020-01-09 ASSESSMENT — CUP TO DISC RATIO
OD_RATIO: 0.2
OS_RATIO: 0.2

## 2020-01-09 ASSESSMENT — VISUAL ACUITY
OS_SC+: -2
OD_SC+: -2
METHOD: SNELLEN - LINEAR
OS_SC: 20/40
OD_SC: 20/30

## 2020-01-09 ASSESSMENT — REFRACTION_WEARINGRX
OS_ADD: +2.50
OD_ADD: +2.50

## 2020-01-09 ASSESSMENT — SLIT LAMP EXAM - LIDS
COMMENTS: PTOSIS OD>OS
COMMENTS: PTOSIS OD>OS

## 2020-01-09 ASSESSMENT — EXTERNAL EXAM - LEFT EYE: OS_EXAM: NORMAL

## 2020-01-09 ASSESSMENT — EXTERNAL EXAM - RIGHT EYE: OD_EXAM: NORMAL

## 2020-01-09 NOTE — NURSING NOTE
Chief Complaints and History of Present Illnesses   Patient presents with     Post Op (Ophthalmology) Left Eye     Chief Complaint(s) and History of Present Illness(es)     Post Op (Ophthalmology) Left Eye     Laterality: left eye    Associated symptoms: floaters (some RE).  Negative for flashes, eye pain and dryness    Pain scale: 0/10              Comments     Lucian is here for post op vit/ cat LE from 12-10-19. He states vision seems better now.     Ivan Tejeda COT 1:13 PM January 9, 2020

## 2020-01-09 NOTE — PROGRESS NOTES
Post-op Status post phacoemulsification w/ intraocular lens implantation SN60WF. 19.0 diopters, serial N  [id number]  2.25 gauge Pars plana vitrectomy (PPV), endolaser, membrane peel, air fluid exchange, SF6 20% left eye     12/10/19     Vision improving. No pain or discomfort.  Retina attached  Doing well    Plan:  Position: any  Retina detachment and endophthalmitis precautions were discussed with the patient and was asked to return if any of the those occur    Avastin injection right eye today for proliferative diabetic retinopathy and new vitreous hemorrhage     Medications to operative eye  Ketorolac (gray top) once a day till finish  Predforte (white top) once a day till finish  Stop Moxifloxacin    Maxitrol ointment at bedtime until gone  Put the eyedrops 5 minutes a part  No heavy lifting   Retina detachment and endophthalmitis precautions were discussed with the patient (increased blurry vision, drainage, new flashes, floaters or a curtain in the visual field) and was asked to return if any of the those occur    Follow up in 1 month with Optical Coherence Tomography both eyes   Possible avastin inj right eye   Will consider cataract evaluation right eye next follow up   If worsening or persistent vitreous hemorrhage right eye; could consider combo surgery     Ronak Perez MD  Ophthalmology Resident, PGY-3    ~~~~~~~~~~~~~~~~~~~~~~~~~~~~~~~~~~   Complete documentation of historical and exam elements from today's encounter can be found in the full encounter summary report (not reduplicated in this progress note).  I personally obtained the chief complaint(s) and history of present illness.  I confirmed and edited as necessary the review of systems, past medical/surgical history, family history, social history, and examination findings as documented by others; and I examined the patient myself.  I personally reviewed the relevant tests, images, and reports as documented above.  I formulated and edited as  necessary the assessment and plan and discussed the findings and management plan with the patient and family    Triny Bunch MD  .  Retina Service   Department of Ophthalmology and Visual Neurosciences   [facility]  Phone: [phone]   Fax: [phone]

## 2020-01-21 ENCOUNTER — DOCUMENTATION ONLY (OUTPATIENT)
Dept: TRANSPLANT | Facility: CLINIC | Age: 65
End: 2020-01-21

## 2020-01-21 NOTE — PROGRESS NOTES
Defer Transplant - Pt choice:  Pt has delayed getting his dental work done, and has cancelled CPEST and RHC appts prior to admit for a trial of inotropes (see 12-5-19 note.)  Dr Sheridan aware - will wait until pt calls us back and is committed to moving forward with AHF treatment options.  Will close transplant episode for now.

## 2020-02-06 ENCOUNTER — OFFICE VISIT (OUTPATIENT)
Dept: OPHTHALMOLOGY | Facility: CLINIC | Age: 65
End: 2020-02-06
Attending: OPHTHALMOLOGY
Payer: COMMERCIAL

## 2020-02-06 DIAGNOSIS — Z48.810 AFTERCARE FOLLOWING SURGERY OF A SENSORY ORGAN: Primary | ICD-10-CM

## 2020-02-06 DIAGNOSIS — H43.11 VITREOUS HEMORRHAGE OF RIGHT EYE (H): ICD-10-CM

## 2020-02-06 DIAGNOSIS — E11.3513 TYPE 2 DIABETES MELLITUS WITH PROLIFERATIVE RETINOPATHY OF BOTH EYES AND MACULAR EDEMA, UNSPECIFIED WHETHER LONG TERM INSULIN USE (H): ICD-10-CM

## 2020-02-06 PROCEDURE — 92134 CPTRZ OPH DX IMG PST SGM RTA: CPT | Mod: ZF | Performed by: OPHTHALMOLOGY

## 2020-02-06 PROCEDURE — 25000128 H RX IP 250 OP 636: Mod: ZF

## 2020-02-06 PROCEDURE — 67028 INJECTION EYE DRUG: CPT | Mod: RT,ZF | Performed by: OPHTHALMOLOGY

## 2020-02-06 PROCEDURE — G0463 HOSPITAL OUTPT CLINIC VISIT: HCPCS | Mod: ZF

## 2020-02-06 PROCEDURE — C9257 BEVACIZUMAB INJECTION: HCPCS | Mod: ZF

## 2020-02-06 ASSESSMENT — REFRACTION_MANIFEST
OD_CYLINDER: +1.00
OS_AXIS: 175
OD_SPHERE: -0.50
OS_CYLINDER: +1.25
OS_SPHERE: -1.25
OD_AXIS: 010

## 2020-02-06 ASSESSMENT — CONF VISUAL FIELD
OS_NORMAL: 1
METHOD: COUNTING FINGERS
OD_NORMAL: 1

## 2020-02-06 ASSESSMENT — EXTERNAL EXAM - RIGHT EYE: OD_EXAM: NORMAL

## 2020-02-06 ASSESSMENT — TONOMETRY
OD_IOP_MMHG: 14
IOP_METHOD: TONOPEN
OS_IOP_MMHG: 11

## 2020-02-06 ASSESSMENT — SLIT LAMP EXAM - LIDS
COMMENTS: PTOSIS OD>OS
COMMENTS: PTOSIS OD>OS

## 2020-02-06 ASSESSMENT — REFRACTION_WEARINGRX
OD_ADD: +2.50
OS_ADD: +2.50

## 2020-02-06 ASSESSMENT — CUP TO DISC RATIO
OD_RATIO: 0.2
OS_RATIO: 0.2

## 2020-02-06 ASSESSMENT — VISUAL ACUITY
OS_CC: 20/40
METHOD: SNELLEN - LINEAR
OD_CC: 20/40
CORRECTION_TYPE: GLASSES
OS_CC+: +1
OD_CC+: -2

## 2020-02-06 ASSESSMENT — EXTERNAL EXAM - LEFT EYE: OS_EXAM: NORMAL

## 2020-02-06 NOTE — PROGRESS NOTES
Post-op Status post phacoemulsification w/ intraocular lens implantation SN60WF. 19.0 diopters, serial N  [id number]  2.25 gauge Pars plana vitrectomy (PPV), endolaser, membrane peel, air fluid exchange, SF6 20% left eye     12/10/19   History of proliferative diabetic retinopathy both eyes and Panretinal laser photocoagulation (PRP)     Vision improving. No pain or discomfort.  Retina attached  Doing well  Intraocular lens left eye in good position with early posterior capsular opacity (PCO)    OCULAR IMAGING  OCT 2-6-20  right eye: good foveal contour; few tiny drusen   left eye: good foveal contour; tiny cystic changes ; few tiny drusen     PLAN:  Cornea suture removal left eye   1gtt proparacaine given  1gtt of betadine 5% administered  Cornea suture removed without complications  1gtt of betadine 5% administered    Avastin injection right eye today for proliferative diabetic retinopathy and vitreous hemorrhage     Follow up in 6 weeks with Optical Coherence Tomography; possible inj right eye and plan for Cataract extraction. Could consider combo surgery if recurrent/ persistent heme right eye     Medications to operative eye  Predforte (white/or pink  top) once x 1 week and stop  vigamox  four times a day  X 3 days   Maxitrol ointment at bedtime until gone  Put the eyedrops 5 minutes a part  No heavy lifting   Retina detachment and endophthalmitis precautions were discussed with the patient (increased blurry vision, drainage, new flashes, floaters or a curtain in the visual field) and was asked to return if any of the those occur    03/19/20 addendum: I called patient today and they are still in [place]. They will call to re-schedule the appointment once they comeback to [place]      ~~~~~~~~~~~~~~~~~~~~~~~~~~~~~~~~~~   Complete documentation of historical and exam elements from today's encounter can be found in the full encounter summary report (not reduplicated in this progress note).  I personally obtained the  chief complaint(s) and history of present illness.  I confirmed and edited as necessary the review of systems, past medical/surgical history, family history, social history, and examination findings as documented by others; and I examined the patient myself.  I personally reviewed the relevant tests, images, and reports as documented above.  I formulated and edited as necessary the assessment and plan and discussed the findings and management plan with the patient and family and No resident or fellow assisted with the procedures performed.  I performed the procedures myself.    Triny Bunch MD   of Ophthalmology.  Retina Service   Department of Ophthalmology and Visual Neurosciences   [facility]  Phone: [phone]   Fax: [phone]

## 2020-03-15 ENCOUNTER — HEALTH MAINTENANCE LETTER (OUTPATIENT)
Age: 65
End: 2020-03-15

## 2020-05-01 NOTE — NURSING NOTE
Chief Complaints and History of Present Illnesses   Patient presents with     Follow Up     Chief Complaint(s) and History of Present Illness(es)     Follow Up     Laterality: both eyes    Quality: blurred vision    Timing: when reading    Associated symptoms: Negative for floaters and flashes    Pain scale: 0/10              Comments     The patient presents for follow up  post op vit/ cat LE from 12-10-19.  The patient notes that his vision is blurred especially at near.  He doesn't wear glasses or OTC readers.  Carly Bruno, COA, COA 1:10 PM 02/06/2020                   
195.9

## 2020-06-12 ENCOUNTER — ANCILLARY PROCEDURE (OUTPATIENT)
Dept: CARDIOLOGY | Facility: CLINIC | Age: 65
End: 2020-06-12
Attending: INTERNAL MEDICINE
Payer: COMMERCIAL

## 2020-06-12 DIAGNOSIS — I42.9 CARDIOMYOPATHY (H): ICD-10-CM

## 2020-06-12 PROCEDURE — 93296 REM INTERROG EVL PM/IDS: CPT | Mod: ZF

## 2020-06-12 PROCEDURE — 93295 DEV INTERROG REMOTE 1/2/MLT: CPT | Performed by: INTERNAL MEDICINE

## 2020-06-18 ENCOUNTER — PATIENT OUTREACH (OUTPATIENT)
Dept: CARDIOLOGY | Facility: CLINIC | Age: 65
End: 2020-06-18

## 2020-06-18 DIAGNOSIS — Z11.59 ENCOUNTER FOR SCREENING FOR OTHER VIRAL DISEASES: Primary | ICD-10-CM

## 2020-06-18 DIAGNOSIS — I50.22 CHRONIC SYSTOLIC HEART FAILURE (H): Primary | Chronic | ICD-10-CM

## 2020-06-18 DIAGNOSIS — I25.10 CORONARY ARTERY DISEASE INVOLVING NATIVE CORONARY ARTERY OF NATIVE HEART WITHOUT ANGINA PECTORIS: ICD-10-CM

## 2020-06-18 RX ORDER — POTASSIUM CHLORIDE 1500 MG/1
20 TABLET, EXTENDED RELEASE ORAL
Status: CANCELLED | OUTPATIENT
Start: 2020-06-18

## 2020-06-18 RX ORDER — LIDOCAINE 40 MG/G
CREAM TOPICAL
Status: CANCELLED | OUTPATIENT
Start: 2020-06-18

## 2020-06-18 RX ORDER — SODIUM CHLORIDE 9 MG/ML
INJECTION, SOLUTION INTRAVENOUS CONTINUOUS
Status: CANCELLED | OUTPATIENT
Start: 2020-06-18

## 2020-06-18 RX ORDER — POTASSIUM CHLORIDE 1500 MG/1
40 TABLET, EXTENDED RELEASE ORAL
Status: CANCELLED | OUTPATIENT
Start: 2020-06-18

## 2020-06-18 NOTE — TELEPHONE ENCOUNTER
Called Lucian with  to discuss upcoming appointments and testing. He is agreeable to telephone visit with Dr. Sheridan to discuss. This is scheduled for 6/22 at 1:30pm.   Coronary angio and rhc currently scheduled for 7/2 - will discuss further after appointment with golden on Monday.

## 2020-06-18 NOTE — PROGRESS NOTES
Date: 6/18/2020    Time of Call: 9:54 AM     Diagnosis:  ICM, ICD shocks     [ VORB ] Ordering provider: Arvin Sheridan MD    Order: coronary angiogram with biplane and right heart cath as soon as able     Order received by: Meenu De Rn     Follow-up/additional notes: orders placed. Will contact patient for scheduling

## 2020-06-22 ENCOUNTER — VIRTUAL VISIT (OUTPATIENT)
Dept: CARDIOLOGY | Facility: CLINIC | Age: 65
End: 2020-06-22
Attending: INTERNAL MEDICINE
Payer: COMMERCIAL

## 2020-06-22 DIAGNOSIS — I50.22 CHRONIC SYSTOLIC HEART FAILURE (H): Primary | Chronic | ICD-10-CM

## 2020-06-22 PROCEDURE — 99215 OFFICE O/P EST HI 40 MIN: CPT | Mod: 95 | Performed by: INTERNAL MEDICINE

## 2020-06-22 NOTE — PROGRESS NOTES
Service Date: 2020     Suyapa Hatfield MD   [address] [address]      RE: Lucian Henderson   MRN: [id number]   : 1955      Dear Dr. Hatfield:      We had the pleasure of seeing Mr. Tee Henderson for followup in our Advanced Heart Failure Clinic.  As you know, he is a 64-year-old gentleman with ischemic cardiomyopathy and severe LV systolic dysfunction who is currently on medical therapy.      Mr. Tee Henderson returns today for his routine followup.  He spent his winter in [facility] with his daughter.  He returned to [place] last week.  His device remotely transmitted data after his arrival in [place] last week.  This showed that he had 19 shocks for VT VF for 4 day from May 22-.  His last shock was on May 26.  He did not seek any medical attention.  He did not lose consciousness.  He felt only 3 shocks.    He denies having any worsening exertional shortness of breath.  He states that he is able to do most of his regular activities and walk without any significant shortness of breath.  He denies having proximal nocturnal dyspnea or orthopnea.  He denies having exertional chest pain or chest pressure.  No lower extremity swelling or abdominal distention.  He states that his weight has been stable.  He has not had any other hospitalizations or ER visits.  He has been compliant in taking his medications regularly.    Of note, we recommended Mr. Oliveira to undergo a repeat right heart catheterization and cardiopulmonary exercise testing in 2019.  However he did not complete these test.  We strongly encouraged him to come initiate the work-up for advanced heart failure therapy including left ventricular assist device and transplantation.  However he was ambivalent about proceeding advanced heart failure therapy and did not complete the work-up.     He has been compliant in taking his medications regularly.      PAST MEDICAL HISTORY:   1.  Hypertension.    2.  Hyperlipidemia.   3.  Diabetes.   4.  Obesity.   5.  Anterior wall MI.   6.  Ischemic cardiomyopathy with an estimated ejection fraction of 10%-20%.   7.  Chronic kidney disease.   8.  Obstructive sleep apnea.      MEDICATIONS:    Current Outpatient Medications   Medication Sig     aspirin 81 MG tablet Take 1 tablet (81 mg) by mouth daily     atorvastatin (LIPITOR) 40 MG tablet Take 1 tablet (40 mg) by mouth daily     blood glucose (ACCU-CHEK SMARTVIEW) test strip USE TO TEST THREE TIMES DAILY AS DIRECTED     blood glucose (NO BRAND SPECIFIED) test strip Use to test blood sugar 2 times daily or as directed.     blood glucose monitoring (ACCU-CHEK FELIPE SMARTVIEW) meter device kit Use to test blood sugar 3-4 times daily, as directed.     blood glucose monitoring (ONE TOUCH DELICA) lancets Use to test blood sugars 2 times daily.     clopidogrel (PLAVIX) 75 MG tablet Take 1 tablet (75 mg) by mouth daily     exenatide ER (BYDUREON) 2 MG pen Inject 2 mg Subcutaneous every 7 days     furosemide (LASIX) 20 MG tablet Take 2 tablets (40 mg) by mouth 2 times daily     glimepiride (AMARYL) 4 MG tablet Take 1 tablet (4 mg) by mouth every morning (before breakfast)     hydrALAZINE (APRESOLINE) 25 MG tablet Take 1 tablet (25 mg) by mouth 3 times daily     insulin glargine (LANTUS SOLOSTAR) 100 UNIT/ML pen Take 8 units in the morning and 40 units in the evening     insulin pen needle (B-D U/F) 31G X 5 MM miscellaneous 1 Units by Device route 2 times daily Use 2 pen needles daily or as directed.     isosorbide mononitrate (IMDUR) 60 MG 24 hr tablet Take 1 tablet (60 mg) by mouth daily     losartan (COZAAR) 50 MG tablet Take 1 tablet (50 mg) by mouth daily     nitroglycerin (NITROSTAT) 0.4 MG SL tablet Place 1 tablet (0.4 mg) under the tongue every 5 minutes as needed for chest pain If you are still having symptoms after 3 doses (15 minutes) call 911.     order for DME Auto CPAP 8-15 cmH20     Current Facility-Administered  Medications   Medication     erythromycin (ROMYCIN) ophthalmic ointment     lidocaine (PF) (XYLOCAINE) 2 % injection 10 mL     Facility-Administered Medications Ordered in Other Visits   Medication     sodium chloride (PF) 0.9% PF flush 10 mL       REVIEW OF SYSTEMS:  A detailed 10-point review of systems was obtained as described in the History of Present Illness.  All other systems reviewed and are negative.      PHYSICAL EXAMINATION:    An exam could not be done due to the telephonic nature of his visit.    He has not had any labs done recently.     ASSESSMENT AND PLAN:      Mr. Lucian Henderson is a 64-year-old gentleman with ischemic cardiomyopathy and severe LV systolic dysfunction who is currently being evaluated for LVAD and transplant.  He returns today for followup.      Overall, his clinical trajectory is very concerning.  Although he states that he feels about the same, there are several objective signs of which are indicating end-stage heart failure.  His cardiopulmonary exercise testing from last year showed a VO2 of 10.2.  His VE/VCO2 slope was high normal.  His systolic blood pressure dropped during exercise.  Subsequent right heart catheterizations have shown moderately reduced cardiac index with elevated left-sided filling pressure.  His renal function has been elevated for many years, and this has been gradually, slowly getting worse.  All these things are suggestive of ambulatory cardiogenic shock.  His beta-blockers were discontinued last fall for ambulatory cardiogenic shock.  Now, he is having ventricular tachycardia/fibrillation requiring multiple shocks.    He clearly has end-stage cardiomyopathy and needs advanced heart failure therapies.  However he is still very hesitant.      It is very likely that his VT VF is due to worsening cardiomyopathy.  However, given his prior coronary artery disease, I have recommended him to undergo coronary angiogram to make sure that we rule out  ischemia.  He is scheduled for this on July 2.  We will repeat a chemistry now to make sure that his renal function is okay.  We will try to do a biplane coronary angiogram to minimize contrast load.  We will also check electrolytes to make sure that they are within normal limits.  We will also check a TSH.  He is currently not on any antiarrhythmic therapy.  He is not on beta-blockers.  Will consider these things after his coronary evaluation.  I discussed with her my electrophysiology colleague.  Ablation would not be the first option.  Antiarrhythmic therapy would be the next best option while he is being considered for advanced heart failure therapy.    If his coronary angiogram is unremarkable and his VT is determined due to worsening cardiomyopathy, I would strongly recommend him to consider advanced heart failure therapy.  Very likely he would be a DT LVAD candidate given his kidney disease.  He would need a heart kidney transplantation however given his age and other comorbidities this would be a difficult option.  He is very ambivalent still.  I discussed with him that this is life-threatening.  He understands this but still hesitant to do advanced heart failure therapy.  He wants to wait for the angiogram and then make a decision.    I did not make any other changes on his medical therapy.  He will continue on hydralazine, isosorbide mononitrate, and low-dose losartan.  We will continue on the same dose of Lasix.  He is not on spironolactone secondary to his coexisting chronic kidney disease.    I explained all these things through an .  We will keep you apprised of his treatment plan and his decisions.      It was a pleasure meeting Mr. Tee Henderson in our Advanced Heart Failure Clinic at the [facility].  We thank you for involving us in his care.  Please do not hesitate to call us in the interim if you have any further questions.      This is a telephone visit of  30 minutes duration with more than 50% of the time spent in counseling the patient. Patient consented for this phone visit.            Sincerely,   Arvin Sheridan MD   [facility]  Heart Failure, Transplant, and Mechanical Circulatory Support Cardiology   Cardiovascular Division  [facility]   [phone]

## 2020-06-22 NOTE — PATIENT INSTRUCTIONS
You were seen today in the Cardiovascular Clinic at the [facility].       Cardiology Providers you saw during your visit: Dr. Franks      Medication Changes:   1. No medication changes.       Follow up Appointment Information:  1. Please get labs drawn as soon as you are able to go in.  2. We will proceed with coronary angiogram and right heart cath. THis will be done on July 2, check in at 8am. See  Below for prep information. Please note that you will need a covid test prior to this procedure and they will call you to set this up.  3. Should you receive any further shocks from your ICD, please go to the emergency room for further care.   4. Follow up with dr. franks after your testing is complete.     Coronary Angiogram  Angiography is a special type of X-ray that lets your doctor view your coronary arteries to see if the blood vessels to your heart are narrowed or blocked.  The catheter can be placed into the groin, arm, or wrist.     Follow these instructions:   You are scheduled for a Coronary Angiogram [place] [address]. You are to check in at the Gold Waiting Area.  When you arrive you will be escorted back to the pre procedure area called 2A. Here they will insert an IV, draw labs, and obtain a short medical history. Please bring an updated list of your current medications.  A physician will come and talk with you about the procedure and obtain consent.  A nurse from the Cardiac Catheterization Lab will then escort you to the procedure area. You will be receiving sedation during the procedure so you will need someone to drive you to and from the hospital.  After the procedure you will recover for a short period (2 - 6 hours) most likely on unit 6D. You will be discharged with instructions for post procedure care. However, depending on what the angiogram shows you may have to have stents placed and this might  "require an overnight stay. We ask that you bring a small bag of necessities for your comfort if you would need to stay overnight. DO NOT BRING ANY VALUABLES!  Pre procedure instructions:  1. Make arrangements to have a  as you will not be allowed to drive following the procedure. Someone should stay with you the night after your procedure.  2.  Do not eat any solid food or milk products for 8 hours prior to the exam. You may drink clear liquids until 2 hours prior to the exam. Clear liquids include the following: water, Jell-O, clear broth, apple juice or any non-carbonated drink that you can see through (no pop!).   3. Do not drink alcohol or smoke 24 hours prior to test.  4. Hold these meds:  Do not take your oral diabetic medication or short acting insulin the day of the procedure. Do not take metformin the day of and for 2 days following, contact your PCP regarding glucose control during this time.  Hold your warfarin (2-3 days prior), pradaxa (2 days prior), Eliquis/Xarelto 1 day prior.   5. You may take your other morning medications as prescribed with a sip of water.   If you currently take a baby aspirin (81mg), take a full aspirin (325mg) the day before and the day of your procedre.  If you are not taking any aspirin currently, take a 325 mg aspirin the day before and the day of your procedure            [address] [facility] on [address]rd Floor   [place] [id number]        Thank you for allowing us to be a part of your care here at the [facility]      If you have questions or concerns please contact us at:      Meenu De RN BSN   Cardiology Care Coordinator  [facility]   Questions and [phone]   First press #1 for the [facility] and then press #4 to \"send a message to your care team\"     "

## 2020-06-22 NOTE — NURSING NOTE
Diet: Patient instructed regarding a heart failure healthy diet, including discussion of reduced fat and 2000 mg daily sodium restriction, daily weights, medication purpose and compliance, fluid restrictions and resources for patient and family to access for assistance with heart failure management.       Labs: Patient was given results of the laboratory testing obtained today and patient was instructed about when to return for the next laboratory testing. Needs labs ASAP prior to angiogram    Med Reconcile: Reviewed and verified all current medications with the patient. The updated medication list was printed and given to the patient.NO changes.      Return Appointment: Patient given instructions regarding scheduling next clinic visit. RTC to see DR. franks after cor angio and rhc.  *proceed with rhc and coronary angiogram scheduled for 7/2 with biplane.     Patient stated he understood all health information given and agreed to call with further questions or concerns.     Meenu De RN

## 2020-06-22 NOTE — LETTER
2020      RE: Lucian Henderson Sr.  [address] [facility] [address]       Dear Colleague,    Thank you for the opportunity to participate in the care of your patient, Lucian Henderson Sr., at the [facility] HEART [facility]. Please see a copy of my visit note below.    Service Date: 2020     Suyapa Hatfield MD   [facility]   [address] [place] [address]      RE: Lucian Henderson   MRN: [id number]   : 1955      Dear Dr. Hatfield:      We had the pleasure of seeing Mr. Tee Henderson for followup in our Advanced Heart Failure Clinic.  As you know, he is a 64-year-old gentleman with ischemic cardiomyopathy and severe LV systolic dysfunction who is currently on medical therapy.      Mr. Tee Henderson returns today for his routine followup.  He spent his winter in [facility] with his daughter.  He returned to [place] last week.  His device remotely transmitted data after his arrival in [place] last week.  This showed that he had 19 shocks for VT VF for 4 day from May 22-.  His last shock was on May 26.  He did not seek any medical attention.  He did not lose consciousness.  He felt only 3 shocks.    He denies having any worsening exertional shortness of breath.  He states that he is able to do most of his regular activities and walk without any significant shortness of breath.  He denies having proximal nocturnal dyspnea or orthopnea.  He denies having exertional chest pain or chest pressure.  No lower extremity swelling or abdominal distention.  He states that his weight has been stable.  He has not had any other hospitalizations or ER visits.  He has been compliant in taking his medications regularly.    Of note, we recommended Mr. Oliveira to undergo a repeat right heart catheterization and cardiopulmonary exercise testing in 2019.  However he did not complete these test.  We strongly encouraged him  to come initiate the work-up for advanced heart failure therapy including left ventricular assist device and transplantation.  However he was ambivalent about proceeding advanced heart failure therapy and did not complete the work-up.     He has been compliant in taking his medications regularly.      PAST MEDICAL HISTORY:   1.  Hypertension.   2.  Hyperlipidemia.   3.  Diabetes.   4.  Obesity.   5.  Anterior wall MI.   6.  Ischemic cardiomyopathy with an estimated ejection fraction of 10%-20%.   7.  Chronic kidney disease.   8.  Obstructive sleep apnea.      MEDICATIONS:    Current Outpatient Medications   Medication Sig     aspirin 81 MG tablet Take 1 tablet (81 mg) by mouth daily     atorvastatin (LIPITOR) 40 MG tablet Take 1 tablet (40 mg) by mouth daily     blood glucose (ACCU-CHEK SMARTVIEW) test strip USE TO TEST THREE TIMES DAILY AS DIRECTED     blood glucose (NO BRAND SPECIFIED) test strip Use to test blood sugar 2 times daily or as directed.     blood glucose monitoring (ACCU-CHEK FELIPE SMARTVIEW) meter device kit Use to test blood sugar 3-4 times daily, as directed.     blood glucose monitoring (ONE TOUCH DELICA) lancets Use to test blood sugars 2 times daily.     clopidogrel (PLAVIX) 75 MG tablet Take 1 tablet (75 mg) by mouth daily     exenatide ER (BYDUREON) 2 MG pen Inject 2 mg Subcutaneous every 7 days     furosemide (LASIX) 20 MG tablet Take 2 tablets (40 mg) by mouth 2 times daily     glimepiride (AMARYL) 4 MG tablet Take 1 tablet (4 mg) by mouth every morning (before breakfast)     hydrALAZINE (APRESOLINE) 25 MG tablet Take 1 tablet (25 mg) by mouth 3 times daily     insulin glargine (LANTUS SOLOSTAR) 100 UNIT/ML pen Take 8 units in the morning and 40 units in the evening     insulin pen needle (B-D U/F) 31G X 5 MM miscellaneous 1 Units by Device route 2 times daily Use 2 pen needles daily or as directed.     isosorbide mononitrate (IMDUR) 60 MG 24 hr tablet Take 1 tablet (60 mg) by mouth daily      losartan (COZAAR) 50 MG tablet Take 1 tablet (50 mg) by mouth daily     nitroglycerin (NITROSTAT) 0.4 MG SL tablet Place 1 tablet (0.4 mg) under the tongue every 5 minutes as needed for chest pain If you are still having symptoms after 3 doses (15 minutes) call 911.     order for DME Auto CPAP 8-15 cmH20     Current Facility-Administered Medications   Medication     erythromycin (ROMYCIN) ophthalmic ointment     lidocaine (PF) (XYLOCAINE) 2 % injection 10 mL     Facility-Administered Medications Ordered in Other Visits   Medication     sodium chloride (PF) 0.9% PF flush 10 mL       REVIEW OF SYSTEMS:  A detailed 10-point review of systems was obtained as described in the History of Present Illness.  All other systems reviewed and are negative.      PHYSICAL EXAMINATION:    An exam could not be done due to the telephonic nature of his visit.    He has not had any labs done recently.     ASSESSMENT AND PLAN:      Mr. Lucian Henderson is a 64-year-old gentleman with ischemic cardiomyopathy and severe LV systolic dysfunction who is currently being evaluated for LVAD and transplant.  He returns today for followup.      Overall, his clinical trajectory is very concerning.  Although he states that he feels about the same, there are several objective signs of which are indicating end-stage heart failure.  His cardiopulmonary exercise testing from last year showed a VO2 of 10.2.  His VE/VCO2 slope was high normal.  His systolic blood pressure dropped during exercise.  Subsequent right heart catheterizations have shown moderately reduced cardiac index with elevated left-sided filling pressure.  His renal function has been elevated for many years, and this has been gradually, slowly getting worse.  All these things are suggestive of ambulatory cardiogenic shock.  His beta-blockers were discontinued last fall for ambulatory cardiogenic shock.  Now, he is having ventricular tachycardia/fibrillation requiring multiple  shocks.    He clearly has end-stage cardiomyopathy and needs advanced heart failure therapies.  However he is still very hesitant.      It is very likely that his VT VF is due to worsening cardiomyopathy.  However, given his prior coronary artery disease, I have recommended him to undergo coronary angiogram to make sure that we rule out ischemia.  He is scheduled for this on July 2.  We will repeat a chemistry now to make sure that his renal function is okay.  We will try to do a biplane coronary angiogram to minimize contrast load.  We will also check electrolytes to make sure that they are within normal limits.  We will also check a TSH.  He is currently not on any antiarrhythmic therapy.  He is not on beta-blockers.  Will consider these things after his coronary evaluation.  I discussed with her my electrophysiology colleague.  Ablation would not be the first option.  Antiarrhythmic therapy would be the next best option while he is being considered for advanced heart failure therapy.    If his coronary angiogram is unremarkable and his VT is determined due to worsening cardiomyopathy, I would strongly recommend him to consider advanced heart failure therapy.  Very likely he would be a DT LVAD candidate given his kidney disease.  He would need a heart kidney transplantation however given his age and other comorbidities this would be a difficult option.  He is very ambivalent still.  I discussed with him that this is life-threatening.  He understands this but still hesitant to do advanced heart failure therapy.  He wants to wait for the angiogram and then make a decision.    I did not make any other changes on his medical therapy.  He will continue on hydralazine, isosorbide mononitrate, and low-dose losartan.  We will continue on the same dose of Lasix.  He is not on spironolactone secondary to his coexisting chronic kidney disease.    I explained all these things through an .  We will keep you apprised  of his treatment plan and his decisions.      It was a pleasure meeting Mr. Tee Henderson in our Advanced Heart Failure Clinic at the [facility].  We thank you for involving us in his care.  Please do not hesitate to call us in the interim if you have any further questions.      This is a telephone visit of 30 minutes duration with more than 50% of the time spent in counseling the patient.            Sincerely,   Arvin Sheridan MD   [facility]  Heart Failure, Transplant, and Mechanical Circulatory Support Cardiology   Cardiovascular Division  [facility]   [phone]

## 2020-06-24 ENCOUNTER — APPOINTMENT (OUTPATIENT)
Dept: INTERPRETER SERVICES | Facility: CLINIC | Age: 65
End: 2020-06-24
Payer: COMMERCIAL

## 2020-06-24 ENCOUNTER — PATIENT OUTREACH (OUTPATIENT)
Dept: CARDIOLOGY | Facility: CLINIC | Age: 65
End: 2020-06-24

## 2020-06-24 NOTE — TELEPHONE ENCOUNTER
Called Lucian to review dates/times/prep for his upcoming coronary angiogram and rhc. He states understanding, no questions at this time. Let him know we would be contacting him for covid testing and it needs to be done for this procedure. He states understanding. Letter with further details written out send today.

## 2020-06-29 DIAGNOSIS — Z11.59 ENCOUNTER FOR SCREENING FOR OTHER VIRAL DISEASES: ICD-10-CM

## 2020-06-29 PROCEDURE — U0003 INFECTIOUS AGENT DETECTION BY NUCLEIC ACID (DNA OR RNA); SEVERE ACUTE RESPIRATORY SYNDROME CORONAVIRUS 2 (SARS-COV-2) (CORONAVIRUS DISEASE [COVID-19]), AMPLIFIED PROBE TECHNIQUE, MAKING USE OF HIGH THROUGHPUT TECHNOLOGIES AS DESCRIBED BY CMS-2020-01-R: HCPCS | Performed by: INTERNAL MEDICINE

## 2020-06-30 LAB
SARS-COV-2 RNA SPEC QL NAA+PROBE: NOT DETECTED
SPECIMEN SOURCE: NORMAL

## 2020-07-01 ENCOUNTER — TELEPHONE (OUTPATIENT)
Dept: CARDIOLOGY | Facility: CLINIC | Age: 65
End: 2020-07-01

## 2020-07-01 ENCOUNTER — APPOINTMENT (OUTPATIENT)
Dept: INTERPRETER SERVICES | Facility: CLINIC | Age: 65
End: 2020-07-01
Payer: COMMERCIAL

## 2020-07-01 LAB
MDC_IDC_EPISODE_DTM: NORMAL
MDC_IDC_EPISODE_DURATION: 109 S
MDC_IDC_EPISODE_DURATION: 1154 S
MDC_IDC_EPISODE_DURATION: 125 S
MDC_IDC_EPISODE_DURATION: 139 S
MDC_IDC_EPISODE_DURATION: 15 S
MDC_IDC_EPISODE_DURATION: 152 S
MDC_IDC_EPISODE_DURATION: 16 S
MDC_IDC_EPISODE_DURATION: 175 S
MDC_IDC_EPISODE_DURATION: 226 S
MDC_IDC_EPISODE_DURATION: 2366 S
MDC_IDC_EPISODE_DURATION: 260 S
MDC_IDC_EPISODE_DURATION: 29 S
MDC_IDC_EPISODE_DURATION: 322 S
MDC_IDC_EPISODE_DURATION: 3249 S
MDC_IDC_EPISODE_DURATION: 33 S
MDC_IDC_EPISODE_DURATION: 366 S
MDC_IDC_EPISODE_DURATION: 4 S
MDC_IDC_EPISODE_DURATION: 420 S
MDC_IDC_EPISODE_DURATION: 420 S
MDC_IDC_EPISODE_DURATION: 452 S
MDC_IDC_EPISODE_DURATION: 457 S
MDC_IDC_EPISODE_DURATION: 46 S
MDC_IDC_EPISODE_DURATION: 48 S
MDC_IDC_EPISODE_DURATION: 49 S
MDC_IDC_EPISODE_DURATION: 49 S
MDC_IDC_EPISODE_DURATION: 51 S
MDC_IDC_EPISODE_DURATION: 55 S
MDC_IDC_EPISODE_DURATION: 555 S
MDC_IDC_EPISODE_DURATION: 556 S
MDC_IDC_EPISODE_DURATION: 58 S
MDC_IDC_EPISODE_DURATION: 6 S
MDC_IDC_EPISODE_DURATION: 667 S
MDC_IDC_EPISODE_DURATION: 7 S
MDC_IDC_EPISODE_DURATION: 71 S
MDC_IDC_EPISODE_DURATION: 77 S
MDC_IDC_EPISODE_DURATION: 77 S
MDC_IDC_EPISODE_DURATION: 79 S
MDC_IDC_EPISODE_DURATION: 8 S
MDC_IDC_EPISODE_DURATION: 826 S
MDC_IDC_EPISODE_DURATION: 837 S
MDC_IDC_EPISODE_DURATION: 95 S
MDC_IDC_EPISODE_ID: NORMAL
MDC_IDC_EPISODE_TYPE: NORMAL
MDC_IDC_EPISODE_VENDOR_TYPE: NORMAL
MDC_IDC_LEAD_IMPLANT_DT: NORMAL
MDC_IDC_LEAD_LOCATION: NORMAL
MDC_IDC_LEAD_LOCATION_DETAIL_1: NORMAL
MDC_IDC_LEAD_MFG: NORMAL
MDC_IDC_LEAD_MODEL: NORMAL
MDC_IDC_LEAD_POLARITY_TYPE: NORMAL
MDC_IDC_LEAD_SERIAL: NORMAL
MDC_IDC_MSMT_BATTERY_DTM: NORMAL
MDC_IDC_MSMT_BATTERY_REMAINING_LONGEVITY: 90 MO
MDC_IDC_MSMT_BATTERY_REMAINING_PERCENTAGE: 93 %
MDC_IDC_MSMT_BATTERY_STATUS: NORMAL
MDC_IDC_MSMT_CAP_CHARGE_DTM: NORMAL
MDC_IDC_MSMT_CAP_CHARGE_DTM: NORMAL
MDC_IDC_MSMT_CAP_CHARGE_ENERGY: 41 J
MDC_IDC_MSMT_CAP_CHARGE_TIME: 7.9 S
MDC_IDC_MSMT_CAP_CHARGE_TIME: 9.4 S
MDC_IDC_MSMT_CAP_CHARGE_TYPE: NORMAL
MDC_IDC_MSMT_CAP_CHARGE_TYPE: NORMAL
MDC_IDC_MSMT_LEADCHNL_RV_IMPEDANCE_VALUE: 532 OHM
MDC_IDC_MSMT_LEADCHNL_RV_PACING_THRESHOLD_AMPLITUDE: 0.9 V
MDC_IDC_MSMT_LEADCHNL_RV_PACING_THRESHOLD_PULSEWIDTH: 0.5 MS
MDC_IDC_PG_IMPLANT_DTM: NORMAL
MDC_IDC_PG_MFG: NORMAL
MDC_IDC_PG_MODEL: NORMAL
MDC_IDC_PG_SERIAL: NORMAL
MDC_IDC_PG_TYPE: NORMAL
MDC_IDC_SESS_CLINIC_NAME: NORMAL
MDC_IDC_SESS_DTM: NORMAL
MDC_IDC_SESS_TYPE: NORMAL
MDC_IDC_SET_BRADY_LOWRATE: 40 {BEATS}/MIN
MDC_IDC_SET_BRADY_MODE: NORMAL
MDC_IDC_SET_LEADCHNL_RV_PACING_AMPLITUDE: 2.4 V
MDC_IDC_SET_LEADCHNL_RV_PACING_POLARITY: NORMAL
MDC_IDC_SET_LEADCHNL_RV_PACING_PULSEWIDTH: 0.5 MS
MDC_IDC_SET_LEADCHNL_RV_SENSING_ADAPTATION_MODE: NORMAL
MDC_IDC_SET_LEADCHNL_RV_SENSING_POLARITY: NORMAL
MDC_IDC_SET_LEADCHNL_RV_SENSING_SENSITIVITY: 0.6 MV
MDC_IDC_SET_ZONE_DETECTION_INTERVAL: 240 MS
MDC_IDC_SET_ZONE_DETECTION_INTERVAL: 300 MS
MDC_IDC_SET_ZONE_DETECTION_INTERVAL: 400 MS
MDC_IDC_SET_ZONE_TYPE: NORMAL
MDC_IDC_SET_ZONE_VENDOR_TYPE: NORMAL
MDC_IDC_STAT_BRADY_DTM_END: NORMAL
MDC_IDC_STAT_BRADY_DTM_START: NORMAL
MDC_IDC_STAT_BRADY_RV_PERCENT_PACED: 0 %
MDC_IDC_STAT_EPISODE_RECENT_COUNT: 1
MDC_IDC_STAT_EPISODE_RECENT_COUNT: 14
MDC_IDC_STAT_EPISODE_RECENT_COUNT: 3
MDC_IDC_STAT_EPISODE_RECENT_COUNT: 35
MDC_IDC_STAT_EPISODE_RECENT_COUNT: 3807
MDC_IDC_STAT_EPISODE_RECENT_COUNT: 4
MDC_IDC_STAT_EPISODE_RECENT_COUNT_DTM_END: NORMAL
MDC_IDC_STAT_EPISODE_RECENT_COUNT_DTM_START: NORMAL
MDC_IDC_STAT_EPISODE_TYPE: NORMAL
MDC_IDC_STAT_EPISODE_VENDOR_TYPE: NORMAL
MDC_IDC_STAT_TACHYTHERAPY_ATP_DELIVERED_RECENT: 7
MDC_IDC_STAT_TACHYTHERAPY_ATP_DELIVERED_TOTAL: 7
MDC_IDC_STAT_TACHYTHERAPY_RECENT_DTM_END: NORMAL
MDC_IDC_STAT_TACHYTHERAPY_RECENT_DTM_START: NORMAL
MDC_IDC_STAT_TACHYTHERAPY_SHOCKS_ABORTED_RECENT: 5
MDC_IDC_STAT_TACHYTHERAPY_SHOCKS_ABORTED_TOTAL: 5
MDC_IDC_STAT_TACHYTHERAPY_SHOCKS_DELIVERED_RECENT: 19
MDC_IDC_STAT_TACHYTHERAPY_SHOCKS_DELIVERED_TOTAL: 19
MDC_IDC_STAT_TACHYTHERAPY_TOTAL_DTM_END: NORMAL
MDC_IDC_STAT_TACHYTHERAPY_TOTAL_DTM_START: NORMAL

## 2020-07-02 ENCOUNTER — APPOINTMENT (OUTPATIENT)
Dept: LAB | Facility: CLINIC | Age: 65
End: 2020-07-02
Attending: INTERNAL MEDICINE
Payer: COMMERCIAL

## 2020-07-02 ENCOUNTER — HOSPITAL ENCOUNTER (OUTPATIENT)
Facility: CLINIC | Age: 65
Discharge: HOME | End: 2020-07-02
Attending: INTERNAL MEDICINE | Admitting: INTERNAL MEDICINE
Payer: COMMERCIAL

## 2020-07-02 ENCOUNTER — APPOINTMENT (OUTPATIENT)
Dept: MEDSURG UNIT | Facility: CLINIC | Age: 65
End: 2020-07-02
Attending: INTERNAL MEDICINE
Payer: COMMERCIAL

## 2020-07-02 VITALS
HEART RATE: 96 BPM | TEMPERATURE: 98.3 F | DIASTOLIC BLOOD PRESSURE: 75 MMHG | WEIGHT: 176 LBS | RESPIRATION RATE: 18 BRPM | HEIGHT: 64 IN | SYSTOLIC BLOOD PRESSURE: 107 MMHG | OXYGEN SATURATION: 96 % | BODY MASS INDEX: 30.05 KG/M2

## 2020-07-02 DIAGNOSIS — I25.10 CORONARY ARTERY DISEASE INVOLVING NATIVE CORONARY ARTERY OF NATIVE HEART WITHOUT ANGINA PECTORIS: ICD-10-CM

## 2020-07-02 DIAGNOSIS — I50.22 CHRONIC SYSTOLIC HEART FAILURE (H): ICD-10-CM

## 2020-07-02 DIAGNOSIS — I50.20 SYSTOLIC HEART FAILURE (H): Primary | ICD-10-CM

## 2020-07-02 PROBLEM — Z98.890 STATUS POST CORONARY ANGIOGRAM: Status: ACTIVE | Noted: 2020-07-02

## 2020-07-02 LAB
ANION GAP SERPL CALCULATED.3IONS-SCNC: 3 MMOL/L (ref 3–14)
APTT PPP: 29 SEC (ref 22–37)
BUN SERPL-MCNC: 30 MG/DL (ref 7–30)
CALCIUM SERPL-MCNC: 8.6 MG/DL (ref 8.5–10.1)
CHLORIDE SERPL-SCNC: 102 MMOL/L (ref 94–109)
CO2 SERPL-SCNC: 31 MMOL/L (ref 20–32)
CREAT SERPL-MCNC: 1.72 MG/DL (ref 0.66–1.25)
ERYTHROCYTE [DISTWIDTH] IN BLOOD BY AUTOMATED COUNT: 15.7 % (ref 10–15)
GFR SERPL CREATININE-BSD FRML MDRD: 40 ML/MIN/{1.73_M2}
GLUCOSE SERPL-MCNC: 94 MG/DL (ref 70–99)
HCT VFR BLD AUTO: 39.6 % (ref 40–53)
HGB BLD-MCNC: 12.6 G/DL (ref 13.3–17.7)
INR PPP: 1.09 (ref 0.86–1.14)
INTERPRETATION ECG - MUSE: NORMAL
MCH RBC QN AUTO: 30.4 PG (ref 26.5–33)
MCHC RBC AUTO-ENTMCNC: 31.8 G/DL (ref 31.5–36.5)
MCV RBC AUTO: 96 FL (ref 78–100)
PLATELET # BLD AUTO: 222 10E9/L (ref 150–450)
POTASSIUM SERPL-SCNC: 3.5 MMOL/L (ref 3.4–5.3)
RBC # BLD AUTO: 4.14 10E12/L (ref 4.4–5.9)
SODIUM SERPL-SCNC: 136 MMOL/L (ref 133–144)
WBC # BLD AUTO: 6.4 10E9/L (ref 4–11)

## 2020-07-02 PROCEDURE — 93457 R HRT ART/GRFT ANGIO: CPT | Performed by: INTERNAL MEDICINE

## 2020-07-02 PROCEDURE — 85610 PROTHROMBIN TIME: CPT | Performed by: INTERNAL MEDICINE

## 2020-07-02 PROCEDURE — 25000125 ZZHC RX 250: Performed by: INTERNAL MEDICINE

## 2020-07-02 PROCEDURE — C1769 GUIDE WIRE: HCPCS | Performed by: INTERNAL MEDICINE

## 2020-07-02 PROCEDURE — 80048 BASIC METABOLIC PNL TOTAL CA: CPT | Performed by: INTERNAL MEDICINE

## 2020-07-02 PROCEDURE — C1894 INTRO/SHEATH, NON-LASER: HCPCS | Performed by: INTERNAL MEDICINE

## 2020-07-02 PROCEDURE — 40000065 ZZH STATISTIC EKG NON-CHARGEABLE

## 2020-07-02 PROCEDURE — 93461 R&L HRT ART/VENTRICLE ANGIO: CPT | Performed by: INTERNAL MEDICINE

## 2020-07-02 PROCEDURE — 85027 COMPLETE CBC AUTOMATED: CPT | Performed by: INTERNAL MEDICINE

## 2020-07-02 PROCEDURE — 93005 ELECTROCARDIOGRAM TRACING: CPT

## 2020-07-02 PROCEDURE — 25000128 H RX IP 250 OP 636: Performed by: INTERNAL MEDICINE

## 2020-07-02 PROCEDURE — 93457 R HRT ART/GRFT ANGIO: CPT | Mod: 26 | Performed by: INTERNAL MEDICINE

## 2020-07-02 PROCEDURE — 40000172 ZZH STATISTIC PROCEDURE PREP ONLY

## 2020-07-02 PROCEDURE — 85730 THROMBOPLASTIN TIME PARTIAL: CPT | Performed by: INTERNAL MEDICINE

## 2020-07-02 PROCEDURE — 27210794 ZZH OR GENERAL SUPPLY STERILE: Performed by: INTERNAL MEDICINE

## 2020-07-02 PROCEDURE — 93010 ELECTROCARDIOGRAM REPORT: CPT | Performed by: INTERNAL MEDICINE

## 2020-07-02 PROCEDURE — 99153 MOD SED SAME PHYS/QHP EA: CPT | Performed by: INTERNAL MEDICINE

## 2020-07-02 PROCEDURE — 99152 MOD SED SAME PHYS/QHP 5/>YRS: CPT | Performed by: INTERNAL MEDICINE

## 2020-07-02 PROCEDURE — 25800030 ZZH RX IP 258 OP 636: Performed by: INTERNAL MEDICINE

## 2020-07-02 PROCEDURE — 99152 MOD SED SAME PHYS/QHP 5/>YRS: CPT | Mod: 59 | Performed by: INTERNAL MEDICINE

## 2020-07-02 PROCEDURE — 36415 COLL VENOUS BLD VENIPUNCTURE: CPT | Performed by: INTERNAL MEDICINE

## 2020-07-02 PROCEDURE — 25000132 ZZH RX MED GY IP 250 OP 250 PS 637: Performed by: INTERNAL MEDICINE

## 2020-07-02 RX ORDER — NALOXONE HYDROCHLORIDE 0.4 MG/ML
.1-.4 INJECTION, SOLUTION INTRAMUSCULAR; INTRAVENOUS; SUBCUTANEOUS
Status: DISCONTINUED | OUTPATIENT
Start: 2020-07-02 | End: 2020-07-02 | Stop reason: HOSPADM

## 2020-07-02 RX ORDER — LIDOCAINE 40 MG/G
CREAM TOPICAL
Status: DISCONTINUED | OUTPATIENT
Start: 2020-07-02 | End: 2020-07-02 | Stop reason: HOSPADM

## 2020-07-02 RX ORDER — EPTIFIBATIDE 2 MG/ML
180 INJECTION, SOLUTION INTRAVENOUS EVERY 10 MIN PRN
Status: DISCONTINUED | OUTPATIENT
Start: 2020-07-02 | End: 2020-07-02 | Stop reason: HOSPADM

## 2020-07-02 RX ORDER — SODIUM CHLORIDE 9 MG/ML
INJECTION, SOLUTION INTRAVENOUS CONTINUOUS
Status: DISCONTINUED | OUTPATIENT
Start: 2020-07-02 | End: 2020-07-02 | Stop reason: HOSPADM

## 2020-07-02 RX ORDER — POTASSIUM CHLORIDE 750 MG/1
20 TABLET, EXTENDED RELEASE ORAL
Status: COMPLETED | OUTPATIENT
Start: 2020-07-02 | End: 2020-07-02

## 2020-07-02 RX ORDER — DOPAMINE HYDROCHLORIDE 160 MG/100ML
2-20 INJECTION, SOLUTION INTRAVENOUS CONTINUOUS PRN
Status: DISCONTINUED | OUTPATIENT
Start: 2020-07-02 | End: 2020-07-02 | Stop reason: HOSPADM

## 2020-07-02 RX ORDER — EPTIFIBATIDE 2 MG/ML
2 INJECTION, SOLUTION INTRAVENOUS CONTINUOUS PRN
Status: DISCONTINUED | OUTPATIENT
Start: 2020-07-02 | End: 2020-07-02 | Stop reason: HOSPADM

## 2020-07-02 RX ORDER — FENTANYL CITRATE 50 UG/ML
INJECTION, SOLUTION INTRAMUSCULAR; INTRAVENOUS
Status: DISCONTINUED | OUTPATIENT
Start: 2020-07-02 | End: 2020-07-02 | Stop reason: HOSPADM

## 2020-07-02 RX ORDER — EPTIFIBATIDE 2 MG/ML
1 INJECTION, SOLUTION INTRAVENOUS CONTINUOUS PRN
Status: DISCONTINUED | OUTPATIENT
Start: 2020-07-02 | End: 2020-07-02 | Stop reason: HOSPADM

## 2020-07-02 RX ORDER — IOPAMIDOL 755 MG/ML
INJECTION, SOLUTION INTRAVASCULAR
Status: DISCONTINUED | OUTPATIENT
Start: 2020-07-02 | End: 2020-07-02 | Stop reason: HOSPADM

## 2020-07-02 RX ORDER — NITROGLYCERIN 20 MG/100ML
10-200 INJECTION INTRAVENOUS CONTINUOUS PRN
Status: DISCONTINUED | OUTPATIENT
Start: 2020-07-02 | End: 2020-07-02 | Stop reason: HOSPADM

## 2020-07-02 RX ORDER — HEPARIN SODIUM 10000 [USP'U]/100ML
100-1000 INJECTION, SOLUTION INTRAVENOUS CONTINUOUS PRN
Status: DISCONTINUED | OUTPATIENT
Start: 2020-07-02 | End: 2020-07-02 | Stop reason: HOSPADM

## 2020-07-02 RX ORDER — NALOXONE HYDROCHLORIDE 0.4 MG/ML
.2-.4 INJECTION, SOLUTION INTRAMUSCULAR; INTRAVENOUS; SUBCUTANEOUS
Status: DISCONTINUED | OUTPATIENT
Start: 2020-07-02 | End: 2020-07-02

## 2020-07-02 RX ORDER — DOBUTAMINE HYDROCHLORIDE 200 MG/100ML
2-20 INJECTION INTRAVENOUS CONTINUOUS PRN
Status: DISCONTINUED | OUTPATIENT
Start: 2020-07-02 | End: 2020-07-02 | Stop reason: HOSPADM

## 2020-07-02 RX ORDER — ARGATROBAN 1 MG/ML
4.39 INJECTION, SOLUTION INTRAVENOUS
Status: DISCONTINUED | OUTPATIENT
Start: 2020-07-02 | End: 2020-07-02 | Stop reason: HOSPADM

## 2020-07-02 RX ORDER — POTASSIUM CHLORIDE 750 MG/1
40 TABLET, EXTENDED RELEASE ORAL
Status: DISCONTINUED | OUTPATIENT
Start: 2020-07-02 | End: 2020-07-02 | Stop reason: HOSPADM

## 2020-07-02 RX ORDER — ACETAMINOPHEN 325 MG/1
650 TABLET ORAL EVERY 4 HOURS PRN
Status: DISCONTINUED | OUTPATIENT
Start: 2020-07-02 | End: 2020-07-02 | Stop reason: HOSPADM

## 2020-07-02 RX ORDER — TIROFIBAN HYDROCHLORIDE 50 UG/ML
0.07 INJECTION INTRAVENOUS CONTINUOUS PRN
Status: DISCONTINUED | OUTPATIENT
Start: 2020-07-02 | End: 2020-07-02 | Stop reason: HOSPADM

## 2020-07-02 RX ORDER — ATROPINE SULFATE 0.1 MG/ML
0.5 INJECTION INTRAVENOUS EVERY 5 MIN PRN
Status: DISCONTINUED | OUTPATIENT
Start: 2020-07-02 | End: 2020-07-02 | Stop reason: HOSPADM

## 2020-07-02 RX ORDER — FENTANYL CITRATE 50 UG/ML
25-50 INJECTION, SOLUTION INTRAMUSCULAR; INTRAVENOUS
Status: DISCONTINUED | OUTPATIENT
Start: 2020-07-02 | End: 2020-07-02 | Stop reason: HOSPADM

## 2020-07-02 RX ORDER — FLUMAZENIL 0.1 MG/ML
0.2 INJECTION, SOLUTION INTRAVENOUS
Status: DISCONTINUED | OUTPATIENT
Start: 2020-07-02 | End: 2020-07-02 | Stop reason: HOSPADM

## 2020-07-02 RX ADMIN — POTASSIUM CHLORIDE 20 MEQ: 750 TABLET, EXTENDED RELEASE ORAL at 09:12

## 2020-07-02 RX ADMIN — SODIUM CHLORIDE: 9 INJECTION, SOLUTION INTRAVENOUS at 08:49

## 2020-07-02 RX ADMIN — ASPIRIN 325 MG: 325 TABLET, COATED ORAL at 08:49

## 2020-07-02 ASSESSMENT — MIFFLIN-ST. JEOR: SCORE: 1489.33

## 2020-07-02 NOTE — PROGRESS NOTES
[facility]   Interventional Cardiology        Consenting/Education for Cardiac Cath Lab Procedure: Right Heart Catheterization, Coronary Angiogram and Possible Percutaneous Intervention     Patient understands we would like to perform .Right Heart Catheterization, Coronary Angiogram and Possible Percutaneous Intervention due to end-stage cardiomyopathy. This procedure will be performed by Dr. Lantigua.    The patient understands the following:     Coronary Angiogram with Possible Percutaneous Intervention: During the portion for the coronary angiogram a fine tube (catheter) is put into the artery in the groin/arm. The tube is carefully passed into each coronary artery. A series of pictures are taken using x-rays and a contrast medium (x-ray dye). The contrast medium is injected to look for any blockages or narrowing in the arteries of the heart. If necessary and indicated, a stent may be deployed during this procedure.  At the end of the procedure the artery may be closed with a special plug, Angio Seal, to stop the bleeding.     Moderate sedation is required for this procedure, which the patient understands. Patient also understands risks and complications of the procedure which include, but are not limited to bruising/swelling around the incision site, infection, bleeding, allergic reaction to local anesthetic, air embolism, arterial puncture, stroke, heart attack.       Patient verbalized understanding of risks and benefits and has elected to proceed with the procedure or procedures listed above.    ELIJAH Chan, CNP  [facility]

## 2020-07-02 NOTE — PROGRESS NOTES
Pt arrived to 2A for CORS and RHC. Pt's primary language is Kyrgyz. He declined  and is able to understand English when spoken slowly and in simple terms. Pt is with his wife who also speaks Amharic.     VSS. Pt has DMII, BG 95. Administered 325mg ASA. K 3.5, administered 20meq K x1 as ordered. IV inserted and NS infusing at 150ml/hr.  Bilateral groin prepped and LE pulses marked. H&P up to date and consent signed.

## 2020-07-02 NOTE — DISCHARGE INSTRUCTIONS
Going Home after an Angioplasty or Stent Placement (Cardiac)  ______________________________________________      After you go home:    Have an adult stay with you for 24 hours.    Drink plenty of fluids.    You may eat your normal diet, unless your doctor tells you otherwise.    For 24 hours:    Relax and take it easy.    Do NOT smoke.    Do NOT make any important or legal decisions.    Do NOT drive or operate machines at home or at work.    Do NOT drink alcohol.    Remove the Band-Aid after 24 hours. If there is minor oozing, apply another Band-aid and remove it after 12 hours.    For 2 days, do NOT have sex or do any heavy exercise.    Do NOT take a bath, or use a hot tub or pool for at least 3 days. You may shower.    Care of groin site  It is normal to have a small bruise or lump at the site.    Do not scrub the site.    For the first 2 days: Do not stoop or squat. When you cough, sneeze or move your bowels, hold your hand over the puncture site and press gently.    Do not lift more than 10 pounds for at least 3 to 5 days.    Do not use lotion or powder near the puncture site for 3 days.    If you start bleeding from the site in your groin, lie down flat and press firmly  on the site. Call your doctor as soon as you can.      Medicines    If you have started taking Plavix or Effient, do not stop taking it until you talk to your heart doctor (cardiologist).    If you are on metformin (Glucophage), do not restart it until you have blood tests (within 2 to 3 days after discharge). When your doctor tells you it is safe, you may restart the metformin.    If you have stopped any other medicines, check with your nurse or provider about when to restart them.    Call 911 right away if you have bleeding that is heavy or does not stop.    Call your doctor if:    You have a large or growing hard lump around the site.    The site is red, swollen, hot or tender.    Blood or fluid is draining from the site.    You have  chills or a fever greater than 101 F (38 C).    Your leg or arm feels numb or cool.    You have hives, a rash or unusual itching.

## 2020-07-03 ENCOUNTER — TELEPHONE (OUTPATIENT)
Dept: CARDIOLOGY | Facility: CLINIC | Age: 65
End: 2020-07-03

## 2020-07-03 ENCOUNTER — HOSPITAL ENCOUNTER (INPATIENT)
Facility: CLINIC | Age: 65
LOS: 31 days | Discharge: HOME | DRG: 001 | End: 2020-08-03
Attending: INTERNAL MEDICINE | Admitting: INTERNAL MEDICINE
Payer: COMMERCIAL

## 2020-07-03 ENCOUNTER — ANCILLARY PROCEDURE (OUTPATIENT)
Dept: CARDIOLOGY | Facility: CLINIC | Age: 65
DRG: 001 | End: 2020-07-03
Attending: STUDENT IN AN ORGANIZED HEALTH CARE EDUCATION/TRAINING PROGRAM
Payer: COMMERCIAL

## 2020-07-03 DIAGNOSIS — Z94.1 HEART REPLACED BY TRANSPLANT (H): ICD-10-CM

## 2020-07-03 DIAGNOSIS — K02.9 DENTAL CARIES: ICD-10-CM

## 2020-07-03 DIAGNOSIS — E11.3513 TYPE 2 DIABETES MELLITUS WITH BOTH EYES AFFECTED BY PROLIFERATIVE RETINOPATHY AND MACULAR EDEMA, WITH LONG-TERM CURRENT USE OF INSULIN (H): ICD-10-CM

## 2020-07-03 DIAGNOSIS — I50.23 ACUTE ON CHRONIC SYSTOLIC CONGESTIVE HEART FAILURE (H): ICD-10-CM

## 2020-07-03 DIAGNOSIS — Z79.4 TYPE 2 DIABETES MELLITUS WITH BOTH EYES AFFECTED BY PROLIFERATIVE RETINOPATHY AND MACULAR EDEMA, WITH LONG-TERM CURRENT USE OF INSULIN (H): ICD-10-CM

## 2020-07-03 DIAGNOSIS — Z94.1 S/P ORTHOTOPIC HEART TRANSPLANT (H): ICD-10-CM

## 2020-07-03 DIAGNOSIS — I50.9 HEART FAILURE (H): ICD-10-CM

## 2020-07-03 DIAGNOSIS — D75.829 HIT (HEPARIN-INDUCED THROMBOCYTOPENIA) (H): ICD-10-CM

## 2020-07-03 DIAGNOSIS — I42.9 CARDIOMYOPATHY (H): ICD-10-CM

## 2020-07-03 DIAGNOSIS — Z94.1 HEART TRANSPLANT, ORTHOTOPIC, STATUS (H): Primary | ICD-10-CM

## 2020-07-03 DIAGNOSIS — E11.3513 TYPE 2 DIABETES MELLITUS WITH PROLIFERATIVE RETINOPATHY OF BOTH EYES AND MACULAR EDEMA (H): ICD-10-CM

## 2020-07-03 LAB
ALBUMIN SERPL-MCNC: 3 G/DL (ref 3.4–5)
ALP SERPL-CCNC: 130 U/L (ref 40–150)
ALT SERPL W P-5'-P-CCNC: 18 U/L (ref 0–70)
ANION GAP SERPL CALCULATED.3IONS-SCNC: 6 MMOL/L (ref 3–14)
AST SERPL W P-5'-P-CCNC: 15 U/L (ref 0–45)
BILIRUB DIRECT SERPL-MCNC: 0.3 MG/DL (ref 0–0.2)
BILIRUB SERPL-MCNC: 1.1 MG/DL (ref 0.2–1.3)
BUN SERPL-MCNC: 33 MG/DL (ref 7–30)
CALCIUM SERPL-MCNC: 8.7 MG/DL (ref 8.5–10.1)
CHLORIDE SERPL-SCNC: 102 MMOL/L (ref 94–109)
CO2 SERPL-SCNC: 25 MMOL/L (ref 20–32)
CREAT SERPL-MCNC: 1.65 MG/DL (ref 0.66–1.25)
ERYTHROCYTE [DISTWIDTH] IN BLOOD BY AUTOMATED COUNT: 15.9 % (ref 10–15)
GFR SERPL CREATININE-BSD FRML MDRD: 42 ML/MIN/{1.73_M2}
GLUCOSE BLDC GLUCOMTR-MCNC: 203 MG/DL (ref 70–99)
GLUCOSE SERPL-MCNC: 229 MG/DL (ref 70–99)
HBA1C MFR BLD: 8.2 % (ref 0–5.6)
HCT VFR BLD AUTO: 37.7 % (ref 40–53)
HGB BLD-MCNC: 12.2 G/DL (ref 13.3–17.7)
INR PPP: 1.12 (ref 0.86–1.14)
INTERPRETATION ECG - MUSE: NORMAL
INTERPRETATION ECG - MUSE: NORMAL
MAGNESIUM SERPL-MCNC: 2.2 MG/DL (ref 1.6–2.3)
MCH RBC QN AUTO: 31.1 PG (ref 26.5–33)
MCHC RBC AUTO-ENTMCNC: 32.4 G/DL (ref 31.5–36.5)
MCV RBC AUTO: 96 FL (ref 78–100)
NT-PROBNP SERPL-MCNC: ABNORMAL PG/ML (ref 0–900)
PLATELET # BLD AUTO: 219 10E9/L (ref 150–450)
POTASSIUM SERPL-SCNC: 4.4 MMOL/L (ref 3.4–5.3)
PROT SERPL-MCNC: 7.1 G/DL (ref 6.8–8.8)
RBC # BLD AUTO: 3.92 10E12/L (ref 4.4–5.9)
SODIUM SERPL-SCNC: 133 MMOL/L (ref 133–144)
WBC # BLD AUTO: 5.6 10E9/L (ref 4–11)

## 2020-07-03 PROCEDURE — 36415 COLL VENOUS BLD VENIPUNCTURE: CPT | Performed by: STUDENT IN AN ORGANIZED HEALTH CARE EDUCATION/TRAINING PROGRAM

## 2020-07-03 PROCEDURE — 25000132 ZZH RX MED GY IP 250 OP 250 PS 637: Performed by: STUDENT IN AN ORGANIZED HEALTH CARE EDUCATION/TRAINING PROGRAM

## 2020-07-03 PROCEDURE — 80048 BASIC METABOLIC PNL TOTAL CA: CPT | Performed by: INTERNAL MEDICINE

## 2020-07-03 PROCEDURE — 40000556 ZZH STATISTIC PERIPHERAL IV START W US GUIDANCE

## 2020-07-03 PROCEDURE — 93005 ELECTROCARDIOGRAM TRACING: CPT

## 2020-07-03 PROCEDURE — 25000125 ZZHC RX 250: Performed by: STUDENT IN AN ORGANIZED HEALTH CARE EDUCATION/TRAINING PROGRAM

## 2020-07-03 PROCEDURE — 85027 COMPLETE CBC AUTOMATED: CPT | Performed by: STUDENT IN AN ORGANIZED HEALTH CARE EDUCATION/TRAINING PROGRAM

## 2020-07-03 PROCEDURE — 83735 ASSAY OF MAGNESIUM: CPT | Performed by: STUDENT IN AN ORGANIZED HEALTH CARE EDUCATION/TRAINING PROGRAM

## 2020-07-03 PROCEDURE — 99223 1ST HOSP IP/OBS HIGH 75: CPT | Mod: 25 | Performed by: INTERNAL MEDICINE

## 2020-07-03 PROCEDURE — 80048 BASIC METABOLIC PNL TOTAL CA: CPT | Performed by: STUDENT IN AN ORGANIZED HEALTH CARE EDUCATION/TRAINING PROGRAM

## 2020-07-03 PROCEDURE — 25000128 H RX IP 250 OP 636: Performed by: STUDENT IN AN ORGANIZED HEALTH CARE EDUCATION/TRAINING PROGRAM

## 2020-07-03 PROCEDURE — 85610 PROTHROMBIN TIME: CPT | Performed by: STUDENT IN AN ORGANIZED HEALTH CARE EDUCATION/TRAINING PROGRAM

## 2020-07-03 PROCEDURE — 83036 HEMOGLOBIN GLYCOSYLATED A1C: CPT | Performed by: STUDENT IN AN ORGANIZED HEALTH CARE EDUCATION/TRAINING PROGRAM

## 2020-07-03 PROCEDURE — 00000146 ZZHCL STATISTIC GLUCOSE BY METER IP

## 2020-07-03 PROCEDURE — 93282 PRGRMG EVAL IMPLANTABLE DFB: CPT | Mod: 26 | Performed by: INTERNAL MEDICINE

## 2020-07-03 PROCEDURE — 93282 PRGRMG EVAL IMPLANTABLE DFB: CPT

## 2020-07-03 PROCEDURE — 25000131 ZZH RX MED GY IP 250 OP 636 PS 637: Performed by: STUDENT IN AN ORGANIZED HEALTH CARE EDUCATION/TRAINING PROGRAM

## 2020-07-03 PROCEDURE — 83880 ASSAY OF NATRIURETIC PEPTIDE: CPT | Performed by: STUDENT IN AN ORGANIZED HEALTH CARE EDUCATION/TRAINING PROGRAM

## 2020-07-03 PROCEDURE — 80076 HEPATIC FUNCTION PANEL: CPT | Performed by: STUDENT IN AN ORGANIZED HEALTH CARE EDUCATION/TRAINING PROGRAM

## 2020-07-03 PROCEDURE — 21400000 ZZH R&B CCU UMMC

## 2020-07-03 PROCEDURE — 93010 ELECTROCARDIOGRAM REPORT: CPT | Mod: 76 | Performed by: INTERNAL MEDICINE

## 2020-07-03 RX ORDER — AMOXICILLIN 250 MG
2 CAPSULE ORAL 2 TIMES DAILY PRN
Status: DISCONTINUED | OUTPATIENT
Start: 2020-07-03 | End: 2020-08-03 | Stop reason: HOSPADM

## 2020-07-03 RX ORDER — POTASSIUM CL/LIDO/0.9 % NACL 10MEQ/0.1L
10 INTRAVENOUS SOLUTION, PIGGYBACK (ML) INTRAVENOUS
Status: DISCONTINUED | OUTPATIENT
Start: 2020-07-03 | End: 2020-08-03 | Stop reason: HOSPADM

## 2020-07-03 RX ORDER — FUROSEMIDE 40 MG
40 TABLET ORAL 2 TIMES DAILY
Status: DISCONTINUED | OUTPATIENT
Start: 2020-07-03 | End: 2020-07-03

## 2020-07-03 RX ORDER — POTASSIUM CHLORIDE 1.5 G/1.58G
20-40 POWDER, FOR SOLUTION ORAL
Status: DISCONTINUED | OUTPATIENT
Start: 2020-07-03 | End: 2020-08-03 | Stop reason: HOSPADM

## 2020-07-03 RX ORDER — POTASSIUM CHLORIDE 7.45 MG/ML
10 INJECTION INTRAVENOUS
Status: DISCONTINUED | OUTPATIENT
Start: 2020-07-03 | End: 2020-08-03 | Stop reason: HOSPADM

## 2020-07-03 RX ORDER — HYDRALAZINE HYDROCHLORIDE 25 MG/1
25 TABLET, FILM COATED ORAL 3 TIMES DAILY
Status: DISCONTINUED | OUTPATIENT
Start: 2020-07-03 | End: 2020-07-04

## 2020-07-03 RX ORDER — ATORVASTATIN CALCIUM 40 MG/1
40 TABLET, FILM COATED ORAL DAILY
Status: DISCONTINUED | OUTPATIENT
Start: 2020-07-03 | End: 2020-07-20

## 2020-07-03 RX ORDER — ASPIRIN 81 MG/1
81 TABLET, CHEWABLE ORAL DAILY
Status: DISCONTINUED | OUTPATIENT
Start: 2020-07-03 | End: 2020-07-03

## 2020-07-03 RX ORDER — DEXTROSE MONOHYDRATE 25 G/50ML
25-50 INJECTION, SOLUTION INTRAVENOUS
Status: DISCONTINUED | OUTPATIENT
Start: 2020-07-03 | End: 2020-08-03 | Stop reason: HOSPADM

## 2020-07-03 RX ORDER — MAGNESIUM SULFATE HEPTAHYDRATE 40 MG/ML
2 INJECTION, SOLUTION INTRAVENOUS DAILY PRN
Status: DISCONTINUED | OUTPATIENT
Start: 2020-07-03 | End: 2020-08-03 | Stop reason: HOSPADM

## 2020-07-03 RX ORDER — NICOTINE POLACRILEX 4 MG
15-30 LOZENGE BUCCAL
Status: DISCONTINUED | OUTPATIENT
Start: 2020-07-03 | End: 2020-08-03 | Stop reason: HOSPADM

## 2020-07-03 RX ORDER — POTASSIUM CHLORIDE 29.8 MG/ML
20 INJECTION INTRAVENOUS
Status: DISCONTINUED | OUTPATIENT
Start: 2020-07-03 | End: 2020-08-03 | Stop reason: HOSPADM

## 2020-07-03 RX ORDER — LIDOCAINE 40 MG/G
CREAM TOPICAL
Status: DISCONTINUED | OUTPATIENT
Start: 2020-07-03 | End: 2020-07-20

## 2020-07-03 RX ORDER — LOSARTAN POTASSIUM 50 MG/1
50 TABLET ORAL DAILY
Status: DISCONTINUED | OUTPATIENT
Start: 2020-07-03 | End: 2020-07-03

## 2020-07-03 RX ORDER — ISOSORBIDE MONONITRATE 30 MG/1
60 TABLET, EXTENDED RELEASE ORAL DAILY
Status: DISCONTINUED | OUTPATIENT
Start: 2020-07-03 | End: 2020-07-09

## 2020-07-03 RX ORDER — ERYTHROMYCIN 5 MG/G
OINTMENT OPHTHALMIC 3 TIMES DAILY
Status: DISCONTINUED | OUTPATIENT
Start: 2020-07-03 | End: 2020-07-03

## 2020-07-03 RX ORDER — CLOPIDOGREL BISULFATE 75 MG/1
75 TABLET ORAL DAILY
Status: DISCONTINUED | OUTPATIENT
Start: 2020-07-03 | End: 2020-07-05

## 2020-07-03 RX ORDER — ASPIRIN 81 MG/1
81 TABLET, CHEWABLE ORAL DAILY
Status: DISCONTINUED | OUTPATIENT
Start: 2020-07-04 | End: 2020-07-20

## 2020-07-03 RX ORDER — LOSARTAN POTASSIUM 50 MG/1
50 TABLET ORAL DAILY
Status: DISCONTINUED | OUTPATIENT
Start: 2020-07-03 | End: 2020-07-09

## 2020-07-03 RX ORDER — LIDOCAINE HYDROCHLORIDE 20 MG/ML
10 INJECTION, SOLUTION EPIDURAL; INFILTRATION; INTRACAUDAL; PERINEURAL ONCE
Status: DISCONTINUED | OUTPATIENT
Start: 2020-07-03 | End: 2020-07-05

## 2020-07-03 RX ORDER — FUROSEMIDE 10 MG/ML
80 INJECTION INTRAMUSCULAR; INTRAVENOUS ONCE
Status: COMPLETED | OUTPATIENT
Start: 2020-07-03 | End: 2020-07-03

## 2020-07-03 RX ORDER — ACETAMINOPHEN 325 MG/1
650 TABLET ORAL EVERY 4 HOURS PRN
Status: DISCONTINUED | OUTPATIENT
Start: 2020-07-03 | End: 2020-07-20

## 2020-07-03 RX ORDER — AMOXICILLIN 250 MG
1 CAPSULE ORAL 2 TIMES DAILY PRN
Status: DISCONTINUED | OUTPATIENT
Start: 2020-07-03 | End: 2020-08-03 | Stop reason: HOSPADM

## 2020-07-03 RX ORDER — POTASSIUM CHLORIDE 750 MG/1
20-40 TABLET, EXTENDED RELEASE ORAL
Status: DISCONTINUED | OUTPATIENT
Start: 2020-07-03 | End: 2020-08-03 | Stop reason: HOSPADM

## 2020-07-03 RX ORDER — NITROGLYCERIN 0.4 MG/1
0.4 TABLET SUBLINGUAL EVERY 5 MIN PRN
Status: DISCONTINUED | OUTPATIENT
Start: 2020-07-03 | End: 2020-07-25

## 2020-07-03 RX ORDER — FUROSEMIDE 10 MG/ML
100 INJECTION INTRAMUSCULAR; INTRAVENOUS ONCE
Status: DISCONTINUED | OUTPATIENT
Start: 2020-07-03 | End: 2020-07-03

## 2020-07-03 RX ORDER — MAGNESIUM SULFATE HEPTAHYDRATE 40 MG/ML
4 INJECTION, SOLUTION INTRAVENOUS EVERY 4 HOURS PRN
Status: DISCONTINUED | OUTPATIENT
Start: 2020-07-03 | End: 2020-08-03 | Stop reason: HOSPADM

## 2020-07-03 RX ADMIN — FUROSEMIDE 10 MG/HR: 10 INJECTION, SOLUTION INTRAVENOUS at 18:16

## 2020-07-03 RX ADMIN — LOSARTAN POTASSIUM 50 MG: 50 TABLET, FILM COATED ORAL at 18:13

## 2020-07-03 RX ADMIN — INSULIN ASPART 1 UNITS: 100 INJECTION, SOLUTION INTRAVENOUS; SUBCUTANEOUS at 18:12

## 2020-07-03 RX ADMIN — FUROSEMIDE 80 MG: 10 INJECTION, SOLUTION INTRAVENOUS at 18:12

## 2020-07-03 RX ADMIN — ENOXAPARIN SODIUM 40 MG: 40 INJECTION SUBCUTANEOUS at 18:12

## 2020-07-03 RX ADMIN — HYDRALAZINE HYDROCHLORIDE 25 MG: 25 TABLET, FILM COATED ORAL at 20:01

## 2020-07-03 ASSESSMENT — ACTIVITIES OF DAILY LIVING (ADL)
ADLS_ACUITY_SCORE: 10
ADLS_ACUITY_SCORE: 10

## 2020-07-03 ASSESSMENT — MIFFLIN-ST. JEOR: SCORE: 1509.74

## 2020-07-03 ASSESSMENT — PAIN DESCRIPTION - DESCRIPTORS: DESCRIPTORS: ACHING

## 2020-07-03 NOTE — Clinical Note
Potential access sites were evaluated for patency using ultrasound.   The left jugular vein was selected. Access was obtained under with Sonosite and Fluoroscopic guidance using a standard 18 guage needle with direct visualization of needle entry.

## 2020-07-03 NOTE — LETTER
Transition Communication Hand-off for Care Transitions to Next Level of Care Provider    Name: Lucianjohanne Henderson Sr.  : 1955  MRN #: [id number]  Primary Care Provider: Suyapa Hatfield     Primary Clinic: [address] [place] [id number]     Reason for Hospitalization:  Heart Failure Exacerbation  CHF (congestive heart failure) (H)  Heart transplant, orthotopic, status (H)  Admit Date/Time: 7/3/2020  2:21 PM  Discharge Date: 8/3/2020  Payor Source: Payor: [employer] / Plan: [employer] MEDICARE ADVANTAGE / Product Type: HMO   Readmission Assessment Measure (SANNA) Risk Score/category: elevated.   Reason for Communication Hand-off Referral: Multiple providers/specialties  Discharge Plan:   Concern for non-adherence with plan of care: no  Discharge Needs Assessment:  Needs      Most Recent Value   Equipment Currently Used at Home  none      Follow-up specialty is recommended: Yes    Follow-up plan:    Future Appointments   Date Time Provider Department Center          8/10/2020  8:30 AM UU LAB GOLD WAITING [facility] O   8/10/2020  9:00 AM U2A ROOM 5 [facility] O   2020  1:30 PM Negrito Rai, [facility]   2020  7:00 AM UU LAB GOLD WAITING [facility] O   2020  7:30 AM UUECHR2 [facility] O   2020  8:40 AM [address] O   2020 10:00 AM U2A ROOM 7 [facility] O   2020  8:30 AM UU LAB GOLD WAITING [facility] O   2020  9:00 AM U2A ROOM 2 [facility] O   2020 11:30 AM UU LAB GOLD WAITING [facility] O   2020 12:00 PM U2A ROOM 1 [facility] O   2020  8:30 AM UU LAB GOLD WAITING [facility] O   2020  9:00 AM U2A ROOM 15 [facility] O   10/12/2020  7:30 AM UUECHR2 [facility] O   10/12/2020  8:30 AM UU LAB GOLD WAITING [facility] O   10/12/2020  9:00 AM U2A ROOM 8 [facility] O   11/10/2020  8:30 AM U LAB GOLD [facility] O    11/10/2020  9:00 AM [facility]   11/10/2020  1:00 PM Arlin Guajardo NP [facility]   12/7/2020  8:30 AM [facility]   12/7/2020  9:00 AM [facility]   12/7/2020  4:00 PM Arvin Sheridan MD [facility]   1/4/2021  8:30 AM [facility]   1/4/2021  9:30 AM UU LAB GOLD WAITING [facility] O   1/4/2021 10:00 AM U2A ROOM 15 [facility] O   1/4/2021  3:30 PM Arvin Sheridan MD [facility]       Any outstanding tests or procedures:        Referrals     Future Labs/Procedures    CARDIAC REHAB REFERRAL     Process Instructions:    Advance to Wellness and Exercise for Life (WEL) Program or to another maintenance exercise program upon completion of phase 2 cardiac rehab.    Weight mgt program is only offered at [facility].    *This therapy referral will be filtered to a centralized scheduling office at [facility] and the patient will receive a call to schedule an appointment at a [place] location most convenient for them. *        Comments:    Please be aware that coverage of these services is subject to the terms and limitations of your health insurance plan.  Call member services at your health plan with any benefit or coverage questions.     Order is sent electronically to central rehab scheduling. Call [phone] if you haven't been contacted regarding these appointments within 2 business days of discharge.      LYMPHEDEMA THERAPY REFERRAL     Process Instructions:    *This therapy referral will be filtered to a centralized scheduling office at [facility] and the patient will receive a call to schedule an appointment at a [place] location most convenient for them. *    Comments:    If you have not heard from the scheduling office within 2 business days, please call [phone] for all locations, with the exception of [place], please call [phone] and [place], please  call [phone].    Please be aware that coverage of these services is subject to the terms and limitations of your health insurance plan.  Call member services at your health plan with any benefit or coverage questions.      Medication Therapy Management Referral     Process Instructions:        This referral will be filtered to a centralized scheduling office at [place] [facility] and the patient will receive a call to schedule an appointment at a [place] location most convenient for them.    Comments:    MTM referral reason            Patient has 5 PTA or Discharge Medications AND one of the following   diagnoses: DM,HF,COPD,AMI DX,PULM HTN       This service is designed to help you get the most from your medications.  A specially trained pharmacist will work closely with you and your doctors  to solve any problems related to your medications and to help you get the   best results from taking them.      The Medication Therapy Management staff will call you to schedule an appointment.                Key Recommendations:  Post hospitalization follow up.     MAX CRAMER RN BSN  Care Coordinator [address]  Phone: [phone]

## 2020-07-03 NOTE — LETTER
July 15, 2020    Lucian Oliveira Santiago Sr.  [address] [facility] [address]      Dear Mr. Tee Henderson,    This letter is sent to confirm that you have completed your transplant work-up and you are a candidate in the heart transplant program at the [facility].  You were placed on the heart active waitlist on 20.      When you are active on the waitlist and an organ becomes available, a coordinator will need to speak to you immediately.  You could be contacted at any time during the day or night as an organ could become available at any time.  Please make certain our office always has your current telephone numbers and address.      Items we will need from you:      We have received approval from your insurance company for the transplant procedure.  It is critical that you notify us if there is any change in your insurance.  It is also important that you familiarize yourself with the details of your specific insurance policy.  Our patient  is available to assist you if you should have any questions regarding your coverage.      An ALA or PRA blood sample may need to be sent here every 3 to 6 months to match you with  donors or any potential living donors.  If you need this testing, special mailing boxes (called mailers) will be sent to you directly from the Outreach Department.  You should take the physician order form and the  to your home laboratory when it is time to for this testing to be done.  Additional mailers can be obtained by calling the Transplant Office and asking to speak to a heart .      During this waiting period, we may request additional periodic laboratory tests with your primary physician.  It will be your responsibility to remind your physician to forward your results to the Transplant Office.      We need to be kept informed of any changes in your medical condition such  as:    o changes in your medications,   o significant changes in your health  o significant infections (such as pneumonia or abscesses)  o blood transfusions  o any condition which requires hospitalization  o any surgery      Remember to complete any routine cancer screening tests required before your transplant.  This includes colonoscopy; prostate screening for men, and mammogram and gynecologic testing for women, as well as dental work.  Your primary care clinic can assist you with arranging for these exams.  Remind your caregivers to forward copies of the records and final reports.    We want you to know that our program has physician and surgeon coverage 24 hours a day, 365 days a year. If this coverage changes or there are substantial program changes, you will be notified in writing by letter.     Attached is a letter from the [employer] for Organ Sharing (UNOS). It describes the services and information offered to patients by UNOS and the Organ Procurement and Transplantation Network.    We appreciate having had the opportunity to participate in your care.  If you have questions, please feel free to call the Transplant Office at [phone] or [phone].      Sincerely,      Solid Organ Transplant  [facility]    Enclosures: UNOS Letter  cc: Care Team                          The Organ Procurement and Transplantation Network  Toll-free patient services line:     Your resource for organ transplant information    If you have a question regarding your own medical care, you always should call your transplant hospital first. However, for general organ transplant-related information, you can call the Organ Procurement and Transplantation Network (OPTN) toll-free patient services line at [phone]. Anyone, including potential transplant candidates, candidates, recipients, family members, friends, living donors, and donor family  members, can call this number to:          Talk about organ donation, living donation, the transplant process, the donation process, and transplant policies.    Get a free patient information kit with helpful booklets, waiting list and transplant information, and a list of all transplant hospitals.    Ask questions about the OPTN website (https://optn.transplant.hrsa.gov/), the [employer] for Organ Sharing s (UNOS) website (https://unos.org/), or the UNOS website for living donors and transplant recipients. (https://www.transplantliving.org/).    Learn how the OPTN can help you.    Talk about any concerns that you may have with a transplant hospital.    The [facility] transplant system, the OPTN, is managed under federal contract by the [employer] Organ Sharing (UNOS), which is a non-profit charitable organization. The OPTN helps create and define organ sharing policies that make the best use of donated organs. This process continuously evaluating new advances and discoveries so policies can be adapted to best serve patients waiting for transplants. To do so, the OPTN works closely with transplant professionals, transplant patients, transplant candidates, donor families, living donors, and the public. All transplant programs and organ procurement organizations throughout the country are OPTN members and are obligated to follow the policies the OPTN creates for allocating organs.    The OPTN also is responsible for:      Providing educational material for patients, the public, and professionals.    Raising awareness of the need for donated organs and tissue.    Coordinating organ procurement, matching, and placement.    Collecting information about every organ transplant and donation that occurs in the [place].    Remember, you should contact your transplant hospital directly if you have questions or concerns about your own medical care including medical records, work-up progress, and test  results.    We are not your transplant hospital, and our staff will not be able to answer questions about your case, so please keep your transplant hospital s phone number handy.    However, while you research your transplant needs and learn as much as you can about transplantation and donation, we welcome your call to our toll-free patient services line at [phone].          Updated 4/1/2019

## 2020-07-03 NOTE — TELEPHONE ENCOUNTER
Called pt, he is working on getting a ride to the hospital for admission. He will be there as soon as he can. Janie Sandoval RN CORE Care Coordinator

## 2020-07-03 NOTE — TELEPHONE ENCOUNTER
Per request of Dr. Damon pt is to be admitted today. Called admitting to get pt a bed. Called pt's son to discuss. Pt's son stated he will let his father know and they will be waiting to hear back on admission time. Janie Sandoval RN CORE Care Coordinator

## 2020-07-03 NOTE — Clinical Note
IABP inserted in the right femoral artery. IABP inserted with 50 cc balloon volume Lot number is: [id number]

## 2020-07-03 NOTE — H&P
[facility]   Cardiology II Service / Advanced Heart Failure  History & Physical    Lucian Morillo  : 1955  MRN # [id number]    ADMIT DATE: 7/3/2020    ASSESSMENT & PLAN:  Lucian Henderson SrMerly is a 64 year old male with a PMH of HTN, DMII, obesity, CKD, CHANTEL, ischemic cardiomyopathy and severe LV systolic dysfunction s/p 3V CABG () and primary prevention ICD ( 4/2/15). Currently he presents with 3 days of worsening fatigue and SOB with minimal exertion. Patient claims he could walk 1-2 blocks last week but has been barely able to ambulate home recently.    Admitted for possible LVAD/Transplant work up.    # ICM s/p 3V CABG () and primary prevention ICD ( 4/2/15)  #HFrEF Stage D, NYHA III-IV  # Ambulatory cardiogenic shock  # Arrhythmia: HF associated VT/VF  Currently he presents with 3 days of worsening fatigue and SOB with minimal exertion, cardiac cachexia and cold extremities. Admitted for possible LVAD/Transplant work up.  - Diuresis : Lasix 80mg + drip of 10 mg/hr  - ASA 81 mg daily  - Atorvastatin 40 daily  - Hydralazine 25 TID  - IMDUR 60 mg  - Losartan 50 mg   - Plan for advanced HF therapy evaluation.     # DMII ( A1c: 8.3)   - Finger sticks  - Hypoglycemia protocol   - Sliding scale insulin       #CKD  - Monitor Scr and K Q12h   - Avoid nephrotoxic agents    Floor Care  Fluids: 1.5L fluid restriction  Food: 2 gram sodium diet, low fat diet  Electrolyte repletion: potassium/magnesium sliding scale  DVT ppx: Lovenox  Glycemic Control: sliding scale insulin   Lines: PIV x1  Consults: none  Code Status: full  Discharge plan: inpatient admission, anticipate discharge TBD    Patient was discussed with attending physician, Dr. Arvin Sheridan MD.    Michael Dillard MD  Internal Medicine Resident  "(PGY1)  [id number]  7/3/2020      ..........................................................................................................................................................    PCP: Suyapa Hatfield MD    CHIEF COMPLAINT: shortness of breath    HPI: Lucian Henderson SrMerly is a 64 year old male with a PMH of HTN, DMII, obesity, CKD, CHANTEL, ischemic cardiomyopathy and severe LV systolic dysfunction  s/p 3V CABG (2008) and primary prevention ICD ( 4/2/15). His device remotely transmitted data after his arrival in [place] on 06/20.This showed that he had 19 shocks for VT VF for 4 days from May 22-26.  His last shock was on May 26.  He did not seek any medical attention.  He did not lose consciousness.  He felt only 3 shocks. As part of his work up he had a RHC (6/18/20 ) which showed RA 20/26/18, RV 85/28, PA 82/45/58, PCWP 45 and an angiogram (6/18/20) which showed a patent LIMA- LAD with collateralization of LAD to PDA and occluded SVG- OM1. Work up for advanced heart failure therapies was recommended but patient was not ambivalent about proceeding as of 6/22/2020.     His cardiopulmonary exercise testing from last year showed a VO2 of 10.2.  His VE/VCO2 slope was high normal.  His systolic blood pressure dropped during exercise.  Subsequent right heart catheterizations have shown moderately reduced cardiac index with elevated left-sided filling pressure.  His renal function has been elevated for many years, and this has been gradually, slowly getting worse.    Currently he presents with 3 days of worsening fatigue and SOB with minimal exertion. Patient claims he could walk 1-2 blocks last week but has been barely able to ambulate home recently.      PHYSICAL EXAM:  Blood pressure 109/77, temperature 98.6  F (37  C), temperature source Oral, resp. rate 20, height 1.626 m (5' 4\"), weight 81.9 kg (180 lb 8 oz), SpO2 99 %.  GENERAL: Appears pale and weak.  NECK: Supple and without lymphadenopathy. JVP to " the angle of mandible   CV: S1/S2 heard without murmur   RESPIRATORY: B/l crackles noted  GI: Soft and distended. No tenderness, rebound, guarding. No palpable organomegaly.   EXTREMITIES: Peripheral edema 3+. 2+ bilateral pedal pulses.   NEUROLOGIC: Alert and orientated x 3.   MUSCULOSKELETAL: No joint swelling or tenderness.   SKIN: No jaundice. No acute rashes or lesions.     ROS:   12-pt ROS otherwise negative except as noted above    PMH:  Past Medical History:   Diagnosis Date     CAD (coronary artery disease)      CHF (congestive heart failure) (H)      CKD (chronic kidney disease), stage III (H)      Cortical cataract of both eyes      Diabetes (H)      Hyperlipidemia      Hypertension      Ischemic cardiomyopathy      Obesity      CHANTEL (obstructive sleep apnea)     occas cpap     Osteoarthritis        PSH:  Past Surgical History:   Procedure Laterality Date     C CABG, ARTERY-VEIN, THREE  02/2008     CATARACT IOL, RT/LT       COLONOSCOPY N/A 8/7/2019    Procedure: COLONOSCOPY, WITH POLYPECTOMY AND BIOPSY;  Surgeon: Chauncey Morataya MD;  Location:  GI     CV RIGHT HEART CATH N/A 3/25/2019    Procedure: CV RIGHT HEART CATH;  Surgeon: Moises Santos MD;  Location:  HEART CARDIAC CATH LAB     CV RIGHT HEART CATH N/A 7/10/2019    Procedure: CV RIGHT HEART CATH;  Surgeon: Jak Mccabe MD;  Location:  HEART CARDIAC CATH LAB     IMPLANT AUTOMATIC IMPLANTABLE CARDIOVERTER DEFIBRILLATOR       PHACOEMULSIFICATION CLEAR CORNEA WITH STANDARD IOL, VITRECTOMY PARSPLANA 25 GAGUE, COMBINED Left 12/10/2019    Procedure: PHACOEMULSIFICATION, CATARACT, CLEAR CORNEAL INCISION APPROACH, W STD INTRAOCULAR LENS IMPLANT INSERT + VITRECTOMY BY PARS PLANA  USING 25-GAUGE INSTRUMENTS. ENDOLASER, INFUSION OF 20% SF6 GAS;  Surgeon: Triny Bunch MD;  [facility]: [facility]       MEDICATIONS:  Prior to Admission Medications   Prescriptions Last Dose Informant Patient Reported? Taking?   aspirin 81 MG tablet  Self No  No   Sig: Take 1 tablet (81 mg) by mouth daily   atorvastatin (LIPITOR) 40 MG tablet  Self No No   Sig: Take 1 tablet (40 mg) by mouth daily   blood glucose (ACCU-CHEK SMARTVIEW) test strip  Self No No   Sig: USE TO TEST THREE TIMES DAILY AS DIRECTED   blood glucose (NO BRAND SPECIFIED) test strip  Self No No   Sig: Use to test blood sugar 2 times daily or as directed.   blood glucose monitoring (ACCU-CHEK FELIPE SMARTVIEW) meter device kit  Self No No   Sig: Use to test blood sugar 3-4 times daily, as directed.   blood glucose monitoring (ONE TOUCH DELICA) lancets  Self No No   Sig: Use to test blood sugars 2 times daily.   clopidogrel (PLAVIX) 75 MG tablet  Self No No   Sig: Take 1 tablet (75 mg) by mouth daily   exenatide ER (BYDUREON) 2 MG pen  Self No No   Sig: Inject 2 mg Subcutaneous every 7 days   furosemide (LASIX) 20 MG tablet  Self No No   Sig: Take 2 tablets (40 mg) by mouth 2 times daily   glimepiride (AMARYL) 4 MG tablet  Self No No   Sig: Take 1 tablet (4 mg) by mouth every morning (before breakfast)   hydrALAZINE (APRESOLINE) 25 MG tablet  Self No No   Sig: Take 1 tablet (25 mg) by mouth 3 times daily   insulin glargine (LANTUS SOLOSTAR) 100 UNIT/ML pen  Self No No   Sig: Take 8 units in the morning and 40 units in the evening   insulin pen needle (B-D U/F) 31G X 5 MM miscellaneous  Self No No   Si Units by Device route 2 times daily Use 2 pen needles daily or as directed.   isosorbide mononitrate (IMDUR) 60 MG 24 hr tablet  Self No No   Sig: Take 1 tablet (60 mg) by mouth daily   losartan (COZAAR) 50 MG tablet  Spouse/Significant Other No No   Sig: Take 1 tablet (50 mg) by mouth daily   nitroglycerin (NITROSTAT) 0.4 MG SL tablet  Self No No   Sig: Place 1 tablet (0.4 mg) under the tongue every 5 minutes as needed for chest pain If you are still having symptoms after 3 doses (15 minutes) call 911.   order for DME  Self No No   Sig: Auto CPAP 8-15 cmH20      Facility-Administered Medications Last  Administration Doses Remaining   erythromycin (ROMYCIN) ophthalmic ointment None recorded    lidocaine (PF) (XYLOCAINE) 2 % injection 10 mL None recorded 1           ALLERGIES:     Allergies   Allergen Reactions     No Known Drug Allergies        FAMILY HISTORY:  Family History   Problem Relation Age of Onset     Diabetes Brother      Diabetes Sister      Diabetes Sister      Macular Degeneration No family hx of      Glaucoma No family hx of      Myocardial Infarction No family hx of      Kidney Disease No family hx of        SOCIAL HISTORY:  Social History     Socioeconomic History     Marital status:      Spouse name: Not on file     Number of children: 4     Years of education: Not on file     Highest education level: Not on file   Occupational History     Occupation:      Employer: [employer]     Employer: RETIRED   Social Needs     Financial resource strain: Not on file     Food insecurity     Worry: Not on file     Inability: Not on file     Transportation needs     Medical: Not on file     Non-medical: Not on file   Tobacco Use     Smoking status: Never Smoker     Smokeless tobacco: Never Used     Tobacco comment: Never smoked; non-smoking household   Substance and Sexual Activity     Alcohol use: No     Alcohol/week: 0.0 standard drinks     Drug use: No     Sexual activity: Yes     Partners: Female   Lifestyle     Physical activity     Days per week: Not on file     Minutes per session: Not on file     Stress: Not on file   Relationships     Social connections     Talks on phone: Not on file     Gets together: Not on file     Attends Synagogue service: Not on file     Active member of club or organization: Not on file     Attends meetings of clubs or organizations: Not on file     Relationship status: Not on file     Intimate partner violence     Fear of current or ex partner: Not on file     Emotionally abused: Not on file     Physically abused: Not on file     Forced sexual  activity: Not on file   Other Topics Concern     Parent/sibling w/ CABG, MI or angioplasty before 65F 55M? No   Social History Narrative     Not on file       LABS:  BMP  Recent Labs   Lab 07/02/20  0813      POTASSIUM 3.5   CHLORIDE 102   CO2 31   BUN 30   CR 1.72*   GLC 94   BRYANNA 8.6     CBC  Recent Labs   Lab 07/02/20  0813   WBC 6.4   HGB 12.6*   HCT 39.6*   MCV 96        INR  Recent Labs   Lab 07/02/20  0813   INR 1.09   PTT 29     IMAGING:    RHC 6/18/20     RA 20/26/18  RV 85/28  PA 82/45/58  PCWP 45  Cardiac output by Jacy: 3.85 L/min (indexed to 2.09 L/min/m2)  Cardiac output by thermodilution: 3.63 L/min (indexed to 1.97 L/min/m2)  SVR (by TD CO): 1123  PVR (by TD CO): 7.4    Angiogram 6/18/20   3 vessel CAD s/p 3V CABG in 2008 (LIMA-LAD, SVG-OM1, SVG-RPDA)  2. Known proximal SVG-RPDA occlusion  3. Presumed occlusion of SVG-OM1 (unable to locate on non-selective aortic root shot)  4. Patent LIMA-LAD with collateralization of LAD to PDA    Echocardiogram ( 3/11/2019)   Moderate to severe left ventricular dilation is present.  Severely (EF 10-20%) reduced left ventricular function is present.  No significant valve dysfunction.  Compared to study done 6/5/17 there is no significant change in LV size and  systolic function    Devices  ICD ([employer]- 4/2/2015 )

## 2020-07-03 NOTE — Clinical Note
dry, intact, no bleeding and no hematoma. 7 Fr MATT Perry @ 56 cm, biopatch, capped, tegaderm, suture

## 2020-07-04 ENCOUNTER — APPOINTMENT (OUTPATIENT)
Dept: CARDIOLOGY | Facility: CLINIC | Age: 65
DRG: 001 | End: 2020-07-04
Attending: INTERNAL MEDICINE
Payer: COMMERCIAL

## 2020-07-04 LAB
ALBUMIN SERPL-MCNC: 3 G/DL (ref 3.4–5)
ALP SERPL-CCNC: 123 U/L (ref 40–150)
ALT SERPL W P-5'-P-CCNC: 19 U/L (ref 0–70)
ANION GAP SERPL CALCULATED.3IONS-SCNC: 6 MMOL/L (ref 3–14)
ANION GAP SERPL CALCULATED.3IONS-SCNC: 6 MMOL/L (ref 3–14)
AST SERPL W P-5'-P-CCNC: 22 U/L (ref 0–45)
BILIRUB DIRECT SERPL-MCNC: 0.3 MG/DL (ref 0–0.2)
BILIRUB SERPL-MCNC: 1.6 MG/DL (ref 0.2–1.3)
BUN SERPL-MCNC: 32 MG/DL (ref 7–30)
BUN SERPL-MCNC: 35 MG/DL (ref 7–30)
CALCIUM SERPL-MCNC: 8.5 MG/DL (ref 8.5–10.1)
CALCIUM SERPL-MCNC: 8.6 MG/DL (ref 8.5–10.1)
CHLORIDE SERPL-SCNC: 101 MMOL/L (ref 94–109)
CHLORIDE SERPL-SCNC: 103 MMOL/L (ref 94–109)
CO2 SERPL-SCNC: 29 MMOL/L (ref 20–32)
CO2 SERPL-SCNC: 30 MMOL/L (ref 20–32)
CREAT SERPL-MCNC: 1.72 MG/DL (ref 0.66–1.25)
CREAT SERPL-MCNC: 1.86 MG/DL (ref 0.66–1.25)
GFR SERPL CREATININE-BSD FRML MDRD: 37 ML/MIN/{1.73_M2}
GFR SERPL CREATININE-BSD FRML MDRD: 40 ML/MIN/{1.73_M2}
GLUCOSE BLDC GLUCOMTR-MCNC: 203 MG/DL (ref 70–99)
GLUCOSE BLDC GLUCOMTR-MCNC: 220 MG/DL (ref 70–99)
GLUCOSE BLDC GLUCOMTR-MCNC: 225 MG/DL (ref 70–99)
GLUCOSE BLDC GLUCOMTR-MCNC: 80 MG/DL (ref 70–99)
GLUCOSE BLDC GLUCOMTR-MCNC: 99 MG/DL (ref 70–99)
GLUCOSE SERPL-MCNC: 214 MG/DL (ref 70–99)
GLUCOSE SERPL-MCNC: 85 MG/DL (ref 70–99)
LACTATE BLD-SCNC: 1 MMOL/L (ref 0.7–2)
MAGNESIUM SERPL-MCNC: 2.1 MG/DL (ref 1.6–2.3)
NT-PROBNP SERPL-MCNC: 8927 PG/ML (ref 0–900)
POTASSIUM SERPL-SCNC: 3.7 MMOL/L (ref 3.4–5.3)
POTASSIUM SERPL-SCNC: 3.9 MMOL/L (ref 3.4–5.3)
PROT SERPL-MCNC: 7.2 G/DL (ref 6.8–8.8)
SODIUM SERPL-SCNC: 135 MMOL/L (ref 133–144)
SODIUM SERPL-SCNC: 139 MMOL/L (ref 133–144)
TSH SERPL DL<=0.005 MIU/L-ACNC: 1.25 MU/L (ref 0.4–4)

## 2020-07-04 PROCEDURE — 36415 COLL VENOUS BLD VENIPUNCTURE: CPT | Performed by: STUDENT IN AN ORGANIZED HEALTH CARE EDUCATION/TRAINING PROGRAM

## 2020-07-04 PROCEDURE — 25000125 ZZHC RX 250: Performed by: STUDENT IN AN ORGANIZED HEALTH CARE EDUCATION/TRAINING PROGRAM

## 2020-07-04 PROCEDURE — 80048 BASIC METABOLIC PNL TOTAL CA: CPT | Performed by: STUDENT IN AN ORGANIZED HEALTH CARE EDUCATION/TRAINING PROGRAM

## 2020-07-04 PROCEDURE — 83880 ASSAY OF NATRIURETIC PEPTIDE: CPT | Performed by: STUDENT IN AN ORGANIZED HEALTH CARE EDUCATION/TRAINING PROGRAM

## 2020-07-04 PROCEDURE — 25500064 ZZH RX 255 OP 636: Performed by: INTERNAL MEDICINE

## 2020-07-04 PROCEDURE — 83735 ASSAY OF MAGNESIUM: CPT | Performed by: STUDENT IN AN ORGANIZED HEALTH CARE EDUCATION/TRAINING PROGRAM

## 2020-07-04 PROCEDURE — 25000132 ZZH RX MED GY IP 250 OP 250 PS 637: Performed by: STUDENT IN AN ORGANIZED HEALTH CARE EDUCATION/TRAINING PROGRAM

## 2020-07-04 PROCEDURE — 80076 HEPATIC FUNCTION PANEL: CPT | Performed by: STUDENT IN AN ORGANIZED HEALTH CARE EDUCATION/TRAINING PROGRAM

## 2020-07-04 PROCEDURE — 25000128 H RX IP 250 OP 636: Performed by: STUDENT IN AN ORGANIZED HEALTH CARE EDUCATION/TRAINING PROGRAM

## 2020-07-04 PROCEDURE — 93306 TTE W/DOPPLER COMPLETE: CPT | Mod: 26 | Performed by: INTERNAL MEDICINE

## 2020-07-04 PROCEDURE — 99233 SBSQ HOSP IP/OBS HIGH 50: CPT | Mod: 25 | Performed by: INTERNAL MEDICINE

## 2020-07-04 PROCEDURE — 25000132 ZZH RX MED GY IP 250 OP 250 PS 637: Performed by: SURGERY

## 2020-07-04 PROCEDURE — 93306 TTE W/DOPPLER COMPLETE: CPT

## 2020-07-04 PROCEDURE — 83605 ASSAY OF LACTIC ACID: CPT | Performed by: STUDENT IN AN ORGANIZED HEALTH CARE EDUCATION/TRAINING PROGRAM

## 2020-07-04 PROCEDURE — 00000146 ZZHCL STATISTIC GLUCOSE BY METER IP

## 2020-07-04 PROCEDURE — 21400000 ZZH R&B CCU UMMC

## 2020-07-04 PROCEDURE — 84443 ASSAY THYROID STIM HORMONE: CPT | Performed by: STUDENT IN AN ORGANIZED HEALTH CARE EDUCATION/TRAINING PROGRAM

## 2020-07-04 RX ORDER — FUROSEMIDE 10 MG/ML
100 INJECTION INTRAMUSCULAR; INTRAVENOUS ONCE
Status: COMPLETED | OUTPATIENT
Start: 2020-07-04 | End: 2020-07-04

## 2020-07-04 RX ADMIN — HYDRALAZINE HYDROCHLORIDE 25 MG: 25 TABLET, FILM COATED ORAL at 09:26

## 2020-07-04 RX ADMIN — ENOXAPARIN SODIUM 40 MG: 40 INJECTION SUBCUTANEOUS at 17:24

## 2020-07-04 RX ADMIN — Medication 15 MG/HR: at 17:24

## 2020-07-04 RX ADMIN — POTASSIUM CHLORIDE 20 MEQ: 750 TABLET, EXTENDED RELEASE ORAL at 17:55

## 2020-07-04 RX ADMIN — HYDRALAZINE HYDROCHLORIDE 25 MG: 25 TABLET, FILM COATED ORAL at 19:46

## 2020-07-04 RX ADMIN — CLOPIDOGREL BISULFATE 75 MG: 75 TABLET ORAL at 09:26

## 2020-07-04 RX ADMIN — ASPIRIN 81 MG CHEWABLE TABLET 81 MG: 81 TABLET CHEWABLE at 09:26

## 2020-07-04 RX ADMIN — Medication 15 MG/HR: at 09:32

## 2020-07-04 RX ADMIN — HUMAN ALBUMIN MICROSPHERES AND PERFLUTREN 5 ML: 10; .22 INJECTION, SOLUTION INTRAVENOUS at 08:45

## 2020-07-04 RX ADMIN — ISOSORBIDE MONONITRATE 60 MG: 60 TABLET, EXTENDED RELEASE ORAL at 09:26

## 2020-07-04 RX ADMIN — LOSARTAN POTASSIUM 50 MG: 50 TABLET, FILM COATED ORAL at 09:26

## 2020-07-04 RX ADMIN — FUROSEMIDE 100 MG: 10 INJECTION, SOLUTION INTRAMUSCULAR; INTRAVENOUS at 09:25

## 2020-07-04 RX ADMIN — ATORVASTATIN CALCIUM 40 MG: 40 TABLET, FILM COATED ORAL at 09:26

## 2020-07-04 RX ADMIN — POTASSIUM CHLORIDE 20 MEQ: 750 TABLET, EXTENDED RELEASE ORAL at 09:25

## 2020-07-04 RX ADMIN — HYDRALAZINE HYDROCHLORIDE 25 MG: 25 TABLET, FILM COATED ORAL at 14:32

## 2020-07-04 RX ADMIN — INSULIN ASPART 1 UNITS: 100 INJECTION, SOLUTION INTRAVENOUS; SUBCUTANEOUS at 11:53

## 2020-07-04 ASSESSMENT — ACTIVITIES OF DAILY LIVING (ADL)
ADLS_ACUITY_SCORE: 12

## 2020-07-04 ASSESSMENT — MIFFLIN-ST. JEOR: SCORE: 1493.87

## 2020-07-04 NOTE — PLAN OF CARE
"D: Admitted 7/3 with heart failure exacerbation.     I: Lasix gtt continues at 10mg/hr.    A: AOx4. Primarily Polish speaker.  utilized for assessments/medication administration. Afebrile. VSS on room air. Sinus rhythm/tach on monitor. Denies pain. Dyspneic on exertion. Voiding in adequate amounts. No BM this shift. Flatus and bowel sounds +. 2g Na diet. Blood sugar checks ACHS. Appeared to sleep well between cares. Up with SBA.     P: Continue to monitor and update cards 2 team with changes/concerns.     /78 (BP Location: Left arm)   Temp 98.4  F (36.9  C) (Oral)   Resp 16   Ht 1.626 m (5' 4\")   Wt 81.9 kg (180 lb 8 oz)   SpO2 96%   BMI 30.98 kg/m      "

## 2020-07-04 NOTE — PLAN OF CARE
D: Pt who presents this admission with heart failure exacerbation and for possible advanced heart therapies. Hx of HTN, DM type II, obesity, CKD, CHANTEL, ischemic cardiomyopathy and severe LV systolic dysfunction s/p 3V CABG (2008) and primary prevention ICD (4/2/15).     I/A: Pt alert and oriented x4. VSS. Pt SR/ST with HRs 80-100s. On RA with O2 sats >92%.  services provided via Ipad at the bedside with pt and wafe. Dr. Sheridan discussed in detail with pt and wife about pt status and options going forward. Per pt report, he would like to go through with LVAD/heart txp workup and will remain at the hospital to do so. Pt denies any pain, lightheadedness or dizziness this shift. Pt appetite good, denies nausea. BG check done ACHS, sliding scale insulin given 2x. Last BM yesterday. Lasix gtt increased to 15 mg/hr this morning; extra dose of IVP lasix also given. Pt voiding. Up with SBA-independently in room and hallways. K+ this morning and afternoon 3.9 and 3.7 respectively, replacement given for both per protocol. Echo done at the bedside, current EF 15%.      P: Continue LVAD/heart txp workup. Continue diuresis. Continue to monitor and assess pt with every encounter. Notify Cards 2 with any changes or concerns.

## 2020-07-04 NOTE — PROGRESS NOTES
[facility]   Cardiology II Service / Advanced Heart Failure  History & Physical    Lucian Morillo  : 1955  MRN # [id number]    ADMIT DATE: 7/3/2020    Changes today  - Lasix bolus 100mg plus drip of 15 mg/ hr   - Continue to closely monitor Scr and K   - Echocardiogram showed an EF of 15%  - After a discussion with his family, patient is ready to proceed with LVAD work up.     ASSESSMENT & PLAN:  Lucian Henderson Sr. is a 64 year old male with a PMH of HTN, DMII, obesity, CKD, CHANTEL, ischemic cardiomyopathy and severe LV systolic dysfunction s/p 3V CABG () and primary prevention ICD ( 4/2/15). Currently he presents with 3 days of worsening fatigue and SOB with minimal exertion. Patient claims he could walk 1-2 blocks last week but has been barely able to ambulate home recently.     Admitted for possible LVAD/Transplant work up.    # ICM s/p 3V CABG () and primary prevention ICD ( 4/2/15)  #HFrEF Stage D, NYHA III-IV  # Ambulatory cardiogenic shock  # Arrhythmia: HF associated VT/VF  Currently he presents with 3 days of worsening fatigue and SOB with minimal exertion, cardiac cachexia and cold extremities. Admitted for possible LVAD/Transplant work up.  - Diuresis : Lasix 100 mg + drip of 15 mg/hr   - ASA 81 mg daily  - Atorvastatin 40 daily  - Hydralazine 25 TID  - IMDUR 60 mg  - Losartan 50 mg   - Plan for advanced HF therapy evaluation.     # DMII ( A1c: 8.3)   - Finger sticks  - Hypoglycemia protocol   - Sliding scale insulin       #CKD  - Monitor Scr and K Q12h   - Avoid nephrotoxic agents    Floor Care  Fluids: 1.5L fluid restriction  Food: 2 gram sodium diet, low fat diet  Electrolyte repletion: potassium/magnesium sliding scale  DVT ppx: Lovenox  Glycemic Control: sliding scale insulin   Lines: PIV x1  Consults: none  Code Status: full  Discharge plan: inpatient admission, anticipate discharge TBD    Patient was discussed with attending physician,   "Arvin Sheridan MD.    Michael Dillard MD  Internal Medicine Resident (PGY1)  2408  7/3/2020      ..........................................................................................................................................................  Objective      PHYSICAL EXAM:  Blood pressure 109/77, temperature 98.6  F (37  C), temperature source Oral, resp. rate 20, height 1.626 m (5' 4\"), weight 81.9 kg (180 lb 8 oz), SpO2 99 %.  GENERAL: Appears pale and weak.  NECK: Supple and without lymphadenopathy. JVP to the middle of the neck.  CV: S1/S2 heard without murmur   RESPIRATORY: B/l crackles noted  GI: Soft and distended. No tenderness, rebound, guarding. No palpable organomegaly.   EXTREMITIES: Peripheral edema 3+. 2+ bilateral pedal pulses.   NEUROLOGIC: Alert and orientated x 3.   MUSCULOSKELETAL: No joint swelling or tenderness.   SKIN: No jaundice. No acute rashes or lesions.     ROS:   12-pt ROS otherwise negative except as noted above    PMH:  Past Medical History:   Diagnosis Date     CAD (coronary artery disease)      CHF (congestive heart failure) (H)      CKD (chronic kidney disease), stage III (H)      Cortical cataract of both eyes      Diabetes (H)      Hyperlipidemia      Hypertension      Ischemic cardiomyopathy      Obesity      CHANTEL (obstructive sleep apnea)     occas cpap     Osteoarthritis        PSH:  Past Surgical History:   Procedure Laterality Date     C CABG, ARTERY-VEIN, THREE  02/2008     CATARACT IOL, RT/LT       COLONOSCOPY N/A 8/7/2019    Procedure: COLONOSCOPY, WITH POLYPECTOMY AND BIOPSY;  Surgeon: Chauncey Morataya MD;  Location: [facility]     CV RIGHT HEART CATH N/A 3/25/2019    Procedure: CV RIGHT HEART CATH;  Surgeon: Moises Santos MD;  Location:  HEART CARDIAC CATH LAB     CV RIGHT HEART CATH N/A 7/10/2019    Procedure: CV RIGHT HEART CATH;  Surgeon: Jak Mccabe MD;  Location:  HEART CARDIAC CATH LAB     IMPLANT AUTOMATIC IMPLANTABLE CARDIOVERTER " DEFIBRILLATOR       PHACOEMULSIFICATION CLEAR CORNEA WITH STANDARD IOL, VITRECTOMY PARSPLANA 25 GAGUE, COMBINED Left 12/10/2019    Procedure: PHACOEMULSIFICATION, CATARACT, CLEAR CORNEAL INCISION APPROACH, W STD INTRAOCULAR LENS IMPLANT INSERT + VITRECTOMY BY PARS PLANA  USING 25-GAUGE INSTRUMENTS. ENDOLASER, INFUSION OF 20% SF6 GAS;  Surgeon: Triny Bunch MD;  Location:  OR       MEDICATIONS:  Prior to Admission Medications   Prescriptions Last Dose Informant Patient Reported? Taking?   aspirin 81 MG tablet  Self No No   Sig: Take 1 tablet (81 mg) by mouth daily   atorvastatin (LIPITOR) 40 MG tablet  Self No No   Sig: Take 1 tablet (40 mg) by mouth daily   blood glucose (ACCU-CHEK SMARTVIEW) test strip  Self No No   Sig: USE TO TEST THREE TIMES DAILY AS DIRECTED   blood glucose (NO BRAND SPECIFIED) test strip  Self No No   Sig: Use to test blood sugar 2 times daily or as directed.   blood glucose monitoring (ACCU-CHEK FELIPE SMARTVIEW) meter device kit  Self No No   Sig: Use to test blood sugar 3-4 times daily, as directed.   blood glucose monitoring (ONE TOUCH DELICA) lancets  Self No No   Sig: Use to test blood sugars 2 times daily.   clopidogrel (PLAVIX) 75 MG tablet  Self No No   Sig: Take 1 tablet (75 mg) by mouth daily   exenatide ER (BYDUREON) 2 MG pen  Self No No   Sig: Inject 2 mg Subcutaneous every 7 days   furosemide (LASIX) 20 MG tablet  Self No No   Sig: Take 2 tablets (40 mg) by mouth 2 times daily   glimepiride (AMARYL) 4 MG tablet  Self No No   Sig: Take 1 tablet (4 mg) by mouth every morning (before breakfast)   hydrALAZINE (APRESOLINE) 25 MG tablet  Self No No   Sig: Take 1 tablet (25 mg) by mouth 3 times daily   insulin glargine (LANTUS SOLOSTAR) 100 UNIT/ML pen  Self No No   Sig: Take 8 units in the morning and 40 units in the evening   insulin pen needle (B-D U/F) 31G X 5 MM miscellaneous  Self No No   Si Units by Device route 2 times daily Use 2 pen needles daily or as  directed.   isosorbide mononitrate (IMDUR) 60 MG 24 hr tablet  Self No No   Sig: Take 1 tablet (60 mg) by mouth daily   losartan (COZAAR) 50 MG tablet  Spouse/Significant Other No No   Sig: Take 1 tablet (50 mg) by mouth daily   nitroglycerin (NITROSTAT) 0.4 MG SL tablet  Self No No   Sig: Place 1 tablet (0.4 mg) under the tongue every 5 minutes as needed for chest pain If you are still having symptoms after 3 doses (15 minutes) call 911.   order for DME  Self No No   Sig: Auto CPAP 8-15 cmH20      Facility-Administered Medications Last Administration Doses Remaining   erythromycin (ROMYCIN) ophthalmic ointment None recorded    lidocaine (PF) (XYLOCAINE) 2 % injection 10 mL None recorded 1           ALLERGIES:     Allergies   Allergen Reactions     No Known Drug Allergies        FAMILY HISTORY:  Family History   Problem Relation Age of Onset     Diabetes Brother      Diabetes Sister      Diabetes Sister      Macular Degeneration No family hx of      Glaucoma No family hx of      Myocardial Infarction No family hx of      Kidney Disease No family hx of        SOCIAL HISTORY:  Social History     Socioeconomic History     Marital status:      Spouse name: Not on file     Number of children: 4     Years of education: Not on file     Highest education level: Not on file   Occupational History     Occupation:      Employer: [employer]     Employer: RETIRED   Social Needs     Financial resource strain: Not on file     Food insecurity     Worry: Not on file     Inability: Not on file     Transportation needs     Medical: Not on file     Non-medical: Not on file   Tobacco Use     Smoking status: Never Smoker     Smokeless tobacco: Never Used     Tobacco comment: Never smoked; non-smoking household   Substance and Sexual Activity     Alcohol use: No     Alcohol/week: 0.0 standard drinks     Drug use: No     Sexual activity: Yes     Partners: Female   Lifestyle     Physical activity     Days per week:  Not on file     Minutes per session: Not on file     Stress: Not on file   Relationships     Social connections     Talks on phone: Not on file     Gets together: Not on file     Attends Hindu service: Not on file     Active member of club or organization: Not on file     Attends meetings of clubs or organizations: Not on file     Relationship status: Not on file     Intimate partner violence     Fear of current or ex partner: Not on file     Emotionally abused: Not on file     Physically abused: Not on file     Forced sexual activity: Not on file   Other Topics Concern     Parent/sibling w/ CABG, MI or angioplasty before 65F 55M? No   Social History Narrative     Not on file       LABS:  BMP  Recent Labs   Lab 07/04/20  0517 07/03/20  1559 07/02/20  0813    133 136   POTASSIUM 3.9 4.4 3.5   CHLORIDE 103 102 102   CO2 30 25 31   BUN 32* 33* 30   CR 1.72* 1.65* 1.72*   GLC 85 229* 94   BRYANNA 8.6 8.7 8.6     CBC  Recent Labs   Lab 07/03/20  1559 07/02/20  0813   WBC 5.6 6.4   HGB 12.2* 12.6*   HCT 37.7* 39.6*   MCV 96 96    222     INR  Recent Labs   Lab 07/03/20  1559 07/02/20  0813   INR 1.12 1.09   PTT  --  29     IMAGING:    RHC 6/18/20     RA 20/26/18  RV 85/28  PA 82/45/58  PCWP 45  Cardiac output by Jacy: 3.85 L/min (indexed to 2.09 L/min/m2)  Cardiac output by thermodilution: 3.63 L/min (indexed to 1.97 L/min/m2)  SVR (by TD CO): 1123  PVR (by TD CO): 7.4    Angiogram 6/18/20   3 vessel CAD s/p 3V CABG in 2008 (LIMA-LAD, SVG-OM1, SVG-RPDA)  2. Known proximal SVG-RPDA occlusion  3. Presumed occlusion of SVG-OM1 (unable to locate on non-selective aortic root shot)  4. Patent LIMA-LAD with collateralization of LAD to PDA    Echocardiogram ( 3/11/2019)   Moderate to severe left ventricular dilation is present.  Severely (EF 10-20%) reduced left ventricular function is present.  No significant valve dysfunction.  Compared to study done 6/5/17 there is no significant change in LV size and  systolic  function    Devices  ICD ([employer]- 4/2/2015 )

## 2020-07-04 NOTE — PLAN OF CARE
Admission          7/3/2020  2:21 PM  -----------------------------------------------------------  Diagnosis: Heart failure exacerbation    Admitted from: Home  Report given from: Leia from cardiology clinic  Via: wheelchair  Accompanied by: wife Shivani  Family Aware of Admission: Yes  Belongings: Placed at the bedside, per pt only has clothes and shoes  Admission Profile: complete  Teaching: orientation to unit, call don't fall, use of console, meal times, visiting hours,  when to call for the RN (angina/sob/dizzyness, etc.), and enforced importance of safety   Access: R PIV  Telemetry:Placed on pt  Ht./Wt.: complete  Full skin assessment done by writer AURORA and Shu BIRD RN    Pt alert and oriented x4.  services used via Ipad at the bedside with pt and wife; adult profile and assessment done. Pt sinus tach with HRs in the 100s. On RA with O2 sats >92%. Reports abdominal discomfort/pain that is tolerable. Pt appetite good, denies nausea. BG check before dinner 229, sliding scale insulin given 1x. Last BM today. Lasix gtt started and currently running at 10 mg/hr, bolus IV dose also given prior. Pt voiding. Up with SBA-independently in room. SW consult placed. Pt and wife interested in information about advance care directive and options to help with transportation.     Temp:  [98.2  F (36.8  C)-98.6  F (37  C)] 98.2  F (36.8  C)  Heart Rate:  [100-105] 105  Resp:  [18-20] 18  BP: (109-117)/(74-79) 113/74  SpO2:  [96 %-99 %] 96 %

## 2020-07-05 LAB
ALBUMIN SERPL-MCNC: 3 G/DL (ref 3.4–5)
ALP SERPL-CCNC: 122 U/L (ref 40–150)
ALT SERPL W P-5'-P-CCNC: 17 U/L (ref 0–70)
ANION GAP SERPL CALCULATED.3IONS-SCNC: 7 MMOL/L (ref 3–14)
ANION GAP SERPL CALCULATED.3IONS-SCNC: 8 MMOL/L (ref 3–14)
ANION GAP SERPL CALCULATED.3IONS-SCNC: 8 MMOL/L (ref 3–14)
AST SERPL W P-5'-P-CCNC: 24 U/L (ref 0–45)
BILIRUB DIRECT SERPL-MCNC: 0.2 MG/DL (ref 0–0.2)
BILIRUB SERPL-MCNC: 1.2 MG/DL (ref 0.2–1.3)
BUN SERPL-MCNC: 33 MG/DL (ref 7–30)
BUN SERPL-MCNC: 34 MG/DL (ref 7–30)
BUN SERPL-MCNC: 37 MG/DL (ref 7–30)
CALCIUM SERPL-MCNC: 8.5 MG/DL (ref 8.5–10.1)
CALCIUM SERPL-MCNC: 8.8 MG/DL (ref 8.5–10.1)
CALCIUM SERPL-MCNC: 8.8 MG/DL (ref 8.5–10.1)
CHLORIDE SERPL-SCNC: 102 MMOL/L (ref 94–109)
CHLORIDE SERPL-SCNC: 103 MMOL/L (ref 94–109)
CHLORIDE SERPL-SCNC: 104 MMOL/L (ref 94–109)
CO2 SERPL-SCNC: 26 MMOL/L (ref 20–32)
CO2 SERPL-SCNC: 26 MMOL/L (ref 20–32)
CO2 SERPL-SCNC: 27 MMOL/L (ref 20–32)
CREAT SERPL-MCNC: 1.66 MG/DL (ref 0.66–1.25)
CREAT SERPL-MCNC: 1.75 MG/DL (ref 0.66–1.25)
CREAT SERPL-MCNC: 1.79 MG/DL (ref 0.66–1.25)
ERYTHROCYTE [DISTWIDTH] IN BLOOD BY AUTOMATED COUNT: 15.9 % (ref 10–15)
ERYTHROCYTE [DISTWIDTH] IN BLOOD BY AUTOMATED COUNT: 16 % (ref 10–15)
GFR SERPL CREATININE-BSD FRML MDRD: 38 ML/MIN/{1.73_M2}
GFR SERPL CREATININE-BSD FRML MDRD: 39 ML/MIN/{1.73_M2}
GFR SERPL CREATININE-BSD FRML MDRD: 42 ML/MIN/{1.73_M2}
GLUCOSE BLDC GLUCOMTR-MCNC: 123 MG/DL (ref 70–99)
GLUCOSE BLDC GLUCOMTR-MCNC: 162 MG/DL (ref 70–99)
GLUCOSE BLDC GLUCOMTR-MCNC: 220 MG/DL (ref 70–99)
GLUCOSE BLDC GLUCOMTR-MCNC: 233 MG/DL (ref 70–99)
GLUCOSE SERPL-MCNC: 129 MG/DL (ref 70–99)
GLUCOSE SERPL-MCNC: 131 MG/DL (ref 70–99)
GLUCOSE SERPL-MCNC: 220 MG/DL (ref 70–99)
HCT VFR BLD AUTO: 39.6 % (ref 40–53)
HCT VFR BLD AUTO: 41.1 % (ref 40–53)
HGB BLD-MCNC: 12.6 G/DL (ref 13.3–17.7)
HGB BLD-MCNC: 12.9 G/DL (ref 13.3–17.7)
MCH RBC QN AUTO: 30.4 PG (ref 26.5–33)
MCH RBC QN AUTO: 30.8 PG (ref 26.5–33)
MCHC RBC AUTO-ENTMCNC: 31.4 G/DL (ref 31.5–36.5)
MCHC RBC AUTO-ENTMCNC: 31.8 G/DL (ref 31.5–36.5)
MCV RBC AUTO: 97 FL (ref 78–100)
MCV RBC AUTO: 97 FL (ref 78–100)
PLATELET # BLD AUTO: 238 10E9/L (ref 150–450)
PLATELET # BLD AUTO: 248 10E9/L (ref 150–450)
POTASSIUM SERPL-SCNC: 3.7 MMOL/L (ref 3.4–5.3)
POTASSIUM SERPL-SCNC: 3.7 MMOL/L (ref 3.4–5.3)
POTASSIUM SERPL-SCNC: 3.8 MMOL/L (ref 3.4–5.3)
PROT SERPL-MCNC: 7.1 G/DL (ref 6.8–8.8)
RBC # BLD AUTO: 4.09 10E12/L (ref 4.4–5.9)
RBC # BLD AUTO: 4.25 10E12/L (ref 4.4–5.9)
SODIUM SERPL-SCNC: 136 MMOL/L (ref 133–144)
SODIUM SERPL-SCNC: 138 MMOL/L (ref 133–144)
SODIUM SERPL-SCNC: 138 MMOL/L (ref 133–144)
WBC # BLD AUTO: 5.5 10E9/L (ref 4–11)
WBC # BLD AUTO: 5.5 10E9/L (ref 4–11)

## 2020-07-05 PROCEDURE — 25000132 ZZH RX MED GY IP 250 OP 250 PS 637: Performed by: SURGERY

## 2020-07-05 PROCEDURE — 00000146 ZZHCL STATISTIC GLUCOSE BY METER IP

## 2020-07-05 PROCEDURE — 25000128 H RX IP 250 OP 636: Performed by: STUDENT IN AN ORGANIZED HEALTH CARE EDUCATION/TRAINING PROGRAM

## 2020-07-05 PROCEDURE — 99233 SBSQ HOSP IP/OBS HIGH 50: CPT | Mod: GC | Performed by: INTERNAL MEDICINE

## 2020-07-05 PROCEDURE — 25000132 ZZH RX MED GY IP 250 OP 250 PS 637: Performed by: STUDENT IN AN ORGANIZED HEALTH CARE EDUCATION/TRAINING PROGRAM

## 2020-07-05 PROCEDURE — 85027 COMPLETE CBC AUTOMATED: CPT | Performed by: STUDENT IN AN ORGANIZED HEALTH CARE EDUCATION/TRAINING PROGRAM

## 2020-07-05 PROCEDURE — 25000132 ZZH RX MED GY IP 250 OP 250 PS 637: Performed by: INTERNAL MEDICINE

## 2020-07-05 PROCEDURE — 80048 BASIC METABOLIC PNL TOTAL CA: CPT | Performed by: STUDENT IN AN ORGANIZED HEALTH CARE EDUCATION/TRAINING PROGRAM

## 2020-07-05 PROCEDURE — 36415 COLL VENOUS BLD VENIPUNCTURE: CPT | Performed by: STUDENT IN AN ORGANIZED HEALTH CARE EDUCATION/TRAINING PROGRAM

## 2020-07-05 PROCEDURE — 80076 HEPATIC FUNCTION PANEL: CPT | Performed by: STUDENT IN AN ORGANIZED HEALTH CARE EDUCATION/TRAINING PROGRAM

## 2020-07-05 PROCEDURE — 21400000 ZZH R&B CCU UMMC

## 2020-07-05 PROCEDURE — 25000125 ZZHC RX 250: Performed by: STUDENT IN AN ORGANIZED HEALTH CARE EDUCATION/TRAINING PROGRAM

## 2020-07-05 RX ADMIN — Medication 37.5 MG: at 19:57

## 2020-07-05 RX ADMIN — ENOXAPARIN SODIUM 40 MG: 40 INJECTION SUBCUTANEOUS at 17:43

## 2020-07-05 RX ADMIN — Medication 37.5 MG: at 08:04

## 2020-07-05 RX ADMIN — ISOSORBIDE MONONITRATE 60 MG: 60 TABLET, EXTENDED RELEASE ORAL at 08:04

## 2020-07-05 RX ADMIN — DOCUSATE SODIUM AND SENNOSIDES 2 TABLET: 8.6; 5 TABLET ORAL at 23:41

## 2020-07-05 RX ADMIN — CLOPIDOGREL BISULFATE 75 MG: 75 TABLET ORAL at 08:04

## 2020-07-05 RX ADMIN — Medication 15 MG/HR: at 19:56

## 2020-07-05 RX ADMIN — ASPIRIN 81 MG CHEWABLE TABLET 81 MG: 81 TABLET CHEWABLE at 08:04

## 2020-07-05 RX ADMIN — POTASSIUM CHLORIDE 20 MEQ: 750 TABLET, EXTENDED RELEASE ORAL at 23:41

## 2020-07-05 RX ADMIN — DOCUSATE SODIUM AND SENNOSIDES 2 TABLET: 8.6; 5 TABLET ORAL at 17:43

## 2020-07-05 RX ADMIN — LOSARTAN POTASSIUM 50 MG: 50 TABLET, FILM COATED ORAL at 08:04

## 2020-07-05 RX ADMIN — ATORVASTATIN CALCIUM 40 MG: 40 TABLET, FILM COATED ORAL at 08:04

## 2020-07-05 RX ADMIN — POTASSIUM CHLORIDE 20 MEQ: 750 TABLET, EXTENDED RELEASE ORAL at 10:01

## 2020-07-05 ASSESSMENT — ACTIVITIES OF DAILY LIVING (ADL)
ADLS_ACUITY_SCORE: 12

## 2020-07-05 NOTE — PLAN OF CARE
D: Pt admitted on 7/3/20 for worsening fatigue and SOB with minimal exertion,  possible LVAD/Transplant work up.  PMH of HTN, DMII, obesity, CKD, CHANTEL, ischemic cardiomyopathy and severe LV systolic dysfunction s/p 3V CABG (2008) and primary prevention ICD ( 4/2/15).      I: Monitored vitals and assessed pt status.   Changed: K replaced per protocol   Running: lasix 15mg/ hr   PRN:       Vitals:  Temp: 98.1  F (36.7  C) Temp src: Oral BP: 96/53 Pulse: 100 Heart Rate: 98 Resp: 18 SpO2: 97 % O2 Device: None (Room air)        A:   Neuro: A&O x 4. Neurologically intact, denies Headache, numbness and tingling calls appropriately   Cardiac: SR, denies Chest pain   Respiratory: sating 90's on RA. Denies SOB   Diet/appetite: 2g Na Diet. Good appetite   Endocrine: BS check  ACHS  GI/:   No BM this shift, denies nausea and abdominal pain. good urine output, voids without difficulty   Activity: independent  Pain: denies pain   Skin: no new deficit noted   LDAs: PIV   Labs: K of 3.7, replaced per protocol      P: Continue to monitor Pt status and report changes to treatment team.

## 2020-07-05 NOTE — PLAN OF CARE
"D: Admitted 7/3 with heart failure exacerbation and consideration of advanced heart failure therapies.      I: Lasix gtt continues at 15mg/hr.     A: AOx4. Primarily Croatian speaker.  utilized for assessments/medication administration. Afebrile. VSS on room air. Sinus rhythm/tach on monitor. Denies pain. Dyspneic on exertion. Voiding in adequate amounts. No BM this shift. Flatus and bowel sounds +. 2g Na diet. Blood sugar checks ACHS. Appeared to sleep well between cares. Up with SBA.      P: Continue to monitor and update cards 2 team with changes/concerns.      BP 97/70 (BP Location: Right arm)   Pulse 89   Temp 98.2  F (36.8  C) (Oral)   Resp 16   Ht 1.626 m (5' 4\")   Wt 80.3 kg (177 lb)   SpO2 99%   BMI 30.38 kg/m      "

## 2020-07-05 NOTE — PROVIDER NOTIFICATION
Notification  Time of notification: 1600   Provider notified: Cards 2 MD   Patient status: BP of 96/53  Order received: hold hydralazin

## 2020-07-05 NOTE — PLAN OF CARE
Finish Transplant Eval:  Mr Tee Henderson underwent a hybrid eval for transplant and VAD in January of 2019.  At the time, he needed to complete a couple of tests, and to finish his dental clearance.  However, the pt was not sure he wanted advanced treatment options, so he was instructed to call the Transplant Office once his work was finished and he was ready to move forward.   He never completed his dental work, and still had ambivalent feelings about these treatment options when he saw Dr Sheridan a couple of weeks ago.   He has now been admitted with cardiogenic shock, and wishes to move forward with VAD/Transplant.  Since his case had been closed with insurance due to lack of pt interest, I spoke with supervisor, Abi Her RN, who spoke to the Pt  on call, Ani Bobo, who gave permission to re-start his eval.  This info was forwarded to Dr Awilda Dillard MD with Dr Sheridan on the Cards 2 inpt service.

## 2020-07-05 NOTE — PROGRESS NOTES
[facility]   Cardiology II Service / Advanced Heart Failure  History & Physical    Lucian Morillo  : 1955  MRN # [id number]    ADMIT DATE: 7/3/2020    Changes today  - Continue lasix drip at 15 mg/ hr   - Started LVAD/ Transplant work up with consults for Neuro/Psyc, CVTS, social, dental, palliative   - He has had a colonoscopy on 19   - After a discussion with his family, patient is ready to proceed with LVAD work up.   - Follow up with insurance about prior authorization.     ASSESSMENT & PLAN:  Lucian Henderson Sr. is a 64 year old male with a PMH of HTN, DMII, obesity, CKD, CHANTEL, ischemic cardiomyopathy and severe LV systolic dysfunction s/p 3V CABG () and primary prevention ICD ( 4/2/15). Currently he presents with 3 days of worsening fatigue and SOB with minimal exertion. Patient claims he could walk 1-2 blocks last week but has been barely able to ambulate home recently.     Admitted for possible LVAD/Transplant work up.    # ICM s/p 3V CABG () and primary prevention ICD ( 4/2/15)  #HFrEF Stage D, NYHA III-IV  # Ambulatory cardiogenic shock  # Arrhythmia: HF associated VT/VF  Currently he presents with 3 days of worsening fatigue and SOB with minimal exertion, cardiac cachexia and cold extremities. Admitted for possible LVAD/Transplant work up. - Echocardiogram showed an EF of 15% on 20  - Diuresis : Lasix drip of 15 mg/hr   - ASA 81 mg daily  - Atorvastatin 40 daily  - Hydralazine 25 TID  - IMDUR 60 mg  - Losartan 50 mg   - Plan for advanced HF therapy evaluation.     # DMII ( A1c: 8.3)   - Finger sticks  - Hypoglycemia protocol   - Moderate intensity sliding scale insulin       #CKD  - Monitor Scr and K Q12h   - Avoid nephrotoxic agents    Floor Care  Fluids: 1.5L fluid restriction  Food: 2 gram sodium diet, low fat diet  Electrolyte repletion: potassium/magnesium sliding scale  DVT ppx: Lovenox  Glycemic Control: sliding scale insulin   Lines:  "PIV x1  Consults: none  Code Status: full    Discharge plan: inpatient admission, anticipate discharge TBD    Patient was discussed with attending physician, Dr. Arvin Sheridan MD.    Michael Dillard MD  Internal Medicine Resident (PGY1)  [id number]  7/3/2020      ..........................................................................................................................................................  Objective      PHYSICAL EXAM:  Blood pressure 109/77, temperature 98.6  F (37  C), temperature source Oral, resp. rate 20, height 1.626 m (5' 4\"), weight 81.9 kg (180 lb 8 oz), SpO2 99 %.  GENERAL: NAD  NECK: Supple and without lymphadenopathy. JVP to the middle of the neck.  CV: S1/S2 heard without murmur   RESPIRATORY: B/l crackles noted  GI: Soft and distended. No tenderness, rebound, guarding. No palpable organomegaly.   EXTREMITIES: Peripheral edema 3+. 2+ bilateral pedal pulses.   NEUROLOGIC: Alert and orientated x 3.   MUSCULOSKELETAL: No joint swelling or tenderness.   SKIN: No jaundice. No acute rashes or lesions.     ROS:   12-pt ROS otherwise negative except as noted above    PMH:  Past Medical History:   Diagnosis Date     CAD (coronary artery disease)      CHF (congestive heart failure) (H)      CKD (chronic kidney disease), stage III (H)      Cortical cataract of both eyes      Diabetes (H)      Hyperlipidemia      Hypertension      Ischemic cardiomyopathy      Obesity      CHANTEL (obstructive sleep apnea)     occas cpap     Osteoarthritis        PSH:  Past Surgical History:   Procedure Laterality Date     C CABG, ARTERY-VEIN, THREE  02/2008     CATARACT IOL, RT/LT       COLONOSCOPY N/A 8/7/2019    Procedure: COLONOSCOPY, WITH POLYPECTOMY AND BIOPSY;  Surgeon: Chauncey Morataya MD;  Location: [facility]     CV RIGHT HEART CATH N/A 3/25/2019    Procedure: CV RIGHT HEART CATH;  Surgeon: Moises Santos MD;  Location:  HEART CARDIAC CATH LAB     CV RIGHT HEART CATH N/A 7/10/2019    Procedure: CV " RIGHT HEART CATH;  Surgeon: Jak Mccabe MD;  Location:  HEART CARDIAC CATH LAB     IMPLANT AUTOMATIC IMPLANTABLE CARDIOVERTER DEFIBRILLATOR       PHACOEMULSIFICATION CLEAR CORNEA WITH STANDARD IOL, VITRECTOMY PARSPLANA 25 GAGUE, COMBINED Left 12/10/2019    Procedure: PHACOEMULSIFICATION, CATARACT, CLEAR CORNEAL INCISION APPROACH, W STD INTRAOCULAR LENS IMPLANT INSERT + VITRECTOMY BY PARS PLANA  USING 25-GAUGE INSTRUMENTS. ENDOLASER, INFUSION OF 20% SF6 GAS;  Surgeon: Triny Bunch MD;  Location:  OR       MEDICATIONS:  Prior to Admission Medications   Prescriptions Last Dose Informant Patient Reported? Taking?   aspirin 81 MG tablet  Self No No   Sig: Take 1 tablet (81 mg) by mouth daily   atorvastatin (LIPITOR) 40 MG tablet  Self No No   Sig: Take 1 tablet (40 mg) by mouth daily   blood glucose (ACCU-CHEK SMARTVIEW) test strip  Self No No   Sig: USE TO TEST THREE TIMES DAILY AS DIRECTED   blood glucose (NO BRAND SPECIFIED) test strip  Self No No   Sig: Use to test blood sugar 2 times daily or as directed.   blood glucose monitoring (ACCU-CHEK FELIPE SMARTVIEW) meter device kit  Self No No   Sig: Use to test blood sugar 3-4 times daily, as directed.   blood glucose monitoring (ONE TOUCH DELICA) lancets  Self No No   Sig: Use to test blood sugars 2 times daily.   clopidogrel (PLAVIX) 75 MG tablet  Self No No   Sig: Take 1 tablet (75 mg) by mouth daily   exenatide ER (BYDUREON) 2 MG pen  Self No No   Sig: Inject 2 mg Subcutaneous every 7 days   furosemide (LASIX) 20 MG tablet  Self No No   Sig: Take 2 tablets (40 mg) by mouth 2 times daily   glimepiride (AMARYL) 4 MG tablet  Self No No   Sig: Take 1 tablet (4 mg) by mouth every morning (before breakfast)   hydrALAZINE (APRESOLINE) 25 MG tablet  Self No No   Sig: Take 1 tablet (25 mg) by mouth 3 times daily   insulin glargine (LANTUS SOLOSTAR) 100 UNIT/ML pen  Self No No   Sig: Take 8 units in the morning and 40 units in the evening   insulin pen  needle (B-D U/F) 31G X 5 MM miscellaneous  Self No No   Si Units by Device route 2 times daily Use 2 pen needles daily or as directed.   isosorbide mononitrate (IMDUR) 60 MG 24 hr tablet  Self No No   Sig: Take 1 tablet (60 mg) by mouth daily   losartan (COZAAR) 50 MG tablet  Spouse/Significant Other No No   Sig: Take 1 tablet (50 mg) by mouth daily   nitroglycerin (NITROSTAT) 0.4 MG SL tablet  Self No No   Sig: Place 1 tablet (0.4 mg) under the tongue every 5 minutes as needed for chest pain If you are still having symptoms after 3 doses (15 minutes) call 911.   order for DME  Self No No   Sig: Auto CPAP 8-15 cmH20      Facility-Administered Medications Last Administration Doses Remaining   erythromycin (ROMYCIN) ophthalmic ointment None recorded    lidocaine (PF) (XYLOCAINE) 2 % injection 10 mL None recorded 1           ALLERGIES:     Allergies   Allergen Reactions     No Known Drug Allergies        FAMILY HISTORY:  Family History   Problem Relation Age of Onset     Diabetes Brother      Diabetes Sister      Diabetes Sister      Macular Degeneration No family hx of      Glaucoma No family hx of      Myocardial Infarction No family hx of      Kidney Disease No family hx of        SOCIAL HISTORY:  Social History     Socioeconomic History     Marital status:      Spouse name: Not on file     Number of children: 4     Years of education: Not on file     Highest education level: Not on file   Occupational History     Occupation:      Employer: [employer]     Employer: RETIRED   Social Needs     Financial resource strain: Not on file     Food insecurity     Worry: Not on file     Inability: Not on file     Transportation needs     Medical: Not on file     Non-medical: Not on file   Tobacco Use     Smoking status: Never Smoker     Smokeless tobacco: Never Used     Tobacco comment: Never smoked; non-smoking household   Substance and Sexual Activity     Alcohol use: No     Alcohol/week: 0.0  standard drinks     Drug use: No     Sexual activity: Yes     Partners: Female   Lifestyle     Physical activity     Days per week: Not on file     Minutes per session: Not on file     Stress: Not on file   Relationships     Social connections     Talks on phone: Not on file     Gets together: Not on file     Attends Protestant service: Not on file     Active member of club or organization: Not on file     Attends meetings of clubs or organizations: Not on file     Relationship status: Not on file     Intimate partner violence     Fear of current or ex partner: Not on file     Emotionally abused: Not on file     Physically abused: Not on file     Forced sexual activity: Not on file   Other Topics Concern     Parent/sibling w/ CABG, MI or angioplasty before 65F 55M? No   Social History Narrative     Not on file       LABS:  BMP  Recent Labs   Lab 07/05/20  0708 07/05/20  0623 07/04/20  1649 07/04/20  0517    138 135 139   POTASSIUM 3.7 3.8 3.7 3.9   CHLORIDE 103 104 101 103   CO2 26 27 29 30   BUN 33* 34* 35* 32*   CR 1.66* 1.75* 1.86* 1.72*   * 131* 214* 85   BRYANNA 8.8 8.8 8.5 8.6     CBC  Recent Labs   Lab 07/05/20  0708 07/05/20  0623 07/03/20  1559 07/02/20  0813   WBC 5.5 5.5 5.6 6.4   HGB 12.6* 12.9* 12.2* 12.6*   HCT 39.6* 41.1 37.7* 39.6*   MCV 97 97 96 96    248 219 222     INR  Recent Labs   Lab 07/03/20  1559 07/02/20  0813   INR 1.12 1.09   PTT  --  29     IMAGING:    RHC 6/18/20     RA 20/26/18  RV 85/28  PA 82/45/58  PCWP 45  Cardiac output by Jacy: 3.85 L/min (indexed to 2.09 L/min/m2)  Cardiac output by thermodilution: 3.63 L/min (indexed to 1.97 L/min/m2)  SVR (by TD CO): 1123  PVR (by TD CO): 7.4    Angiogram 6/18/20   3 vessel CAD s/p 3V CABG in 2008 (LIMA-LAD, SVG-OM1, SVG-RPDA)  2. Known proximal SVG-RPDA occlusion  3. Presumed occlusion of SVG-OM1 (unable to locate on non-selective aortic root shot)  4. Patent LIMA-LAD with collateralization of LAD to PDA    Echocardiogram (  3/11/2019)   Moderate to severe left ventricular dilation is present.  Severely (EF 10-20%) reduced left ventricular function is present.  No significant valve dysfunction.  Compared to study done 6/5/17 there is no significant change in LV size and  systolic function    Echocardiogram ( 7/5/2020)   Interpretation Summary  Severely (EF 10-20%) reduced left ventricular function is present. LVEF 15%  based on biplane 2D tracing. Severe left ventricular dilation is present.  LVIDd:6.8 cm. Grade III or advanced diastolic dysfunction.  The right ventricle is normal size.  Global right ventricular function is moderately reduced.  No significant valvular abnormalities were noted.  IVC diameter and respiratory changes fall into an intermediate range  suggesting an RA pressure of 8 mmHg.  Mild pulmonary hypertension is present.     This study was compared with the study from 3/25/2019. RV function has  worsened, LV size and function appear similar.    Devices  ICD ([employer]- 4/2/2015 )

## 2020-07-06 ENCOUNTER — APPOINTMENT (OUTPATIENT)
Dept: CT IMAGING | Facility: CLINIC | Age: 65
DRG: 001 | End: 2020-07-06
Attending: INTERNAL MEDICINE
Payer: COMMERCIAL

## 2020-07-06 PROBLEM — I50.23 ACUTE ON CHRONIC SYSTOLIC CONGESTIVE HEART FAILURE (H): Status: ACTIVE | Noted: 2020-07-03

## 2020-07-06 LAB
ALBUMIN SERPL-MCNC: 3.1 G/DL (ref 3.4–5)
ALP SERPL-CCNC: 38 U/L (ref 40–150)
ALT SERPL W P-5'-P-CCNC: 15 U/L (ref 0–70)
ANION GAP SERPL CALCULATED.3IONS-SCNC: 6 MMOL/L (ref 3–14)
ANION GAP SERPL CALCULATED.3IONS-SCNC: ABNORMAL MMOL/L (ref 3–14)
AST SERPL W P-5'-P-CCNC: 16 U/L (ref 0–45)
BILIRUB DIRECT SERPL-MCNC: 0.2 MG/DL (ref 0–0.2)
BILIRUB SERPL-MCNC: 1 MG/DL (ref 0.2–1.3)
BUN SERPL-MCNC: 36 MG/DL (ref 7–30)
BUN SERPL-MCNC: 38 MG/DL (ref 7–30)
CALCIUM SERPL-MCNC: 8.8 MG/DL (ref 8.5–10.1)
CALCIUM SERPL-MCNC: 8.8 MG/DL (ref 8.5–10.1)
CALCIUM SERPL-MCNC: ABNORMAL MG/DL (ref 8.5–10.1)
CHLORIDE SERPL-SCNC: 102 MMOL/L (ref 94–109)
CHLORIDE SERPL-SCNC: 98 MMOL/L (ref 94–109)
CO2 SERPL-SCNC: 21 MMOL/L (ref 20–32)
CO2 SERPL-SCNC: 24 MMOL/L (ref 20–32)
CREAT SERPL-MCNC: 1.74 MG/DL (ref 0.66–1.25)
CREAT SERPL-MCNC: 1.76 MG/DL (ref 0.66–1.25)
ERYTHROCYTE [DISTWIDTH] IN BLOOD BY AUTOMATED COUNT: 15.9 % (ref 10–15)
GFR SERPL CREATININE-BSD FRML MDRD: 39 ML/MIN/{1.73_M2}
GFR SERPL CREATININE-BSD FRML MDRD: 40 ML/MIN/{1.73_M2}
GLUCOSE BLDC GLUCOMTR-MCNC: 138 MG/DL (ref 70–99)
GLUCOSE BLDC GLUCOMTR-MCNC: 159 MG/DL (ref 70–99)
GLUCOSE BLDC GLUCOMTR-MCNC: 166 MG/DL (ref 70–99)
GLUCOSE BLDC GLUCOMTR-MCNC: 248 MG/DL (ref 70–99)
GLUCOSE BLDC GLUCOMTR-MCNC: 312 MG/DL (ref 70–99)
GLUCOSE BLDC GLUCOMTR-MCNC: 312 MG/DL (ref 70–99)
GLUCOSE SERPL-MCNC: 140 MG/DL (ref 70–99)
GLUCOSE SERPL-MCNC: 265 MG/DL (ref 70–99)
HCT VFR BLD AUTO: 41.3 % (ref 40–53)
HGB BLD-MCNC: 12.8 G/DL (ref 13.3–17.7)
MAGNESIUM SERPL-MCNC: 2.2 MG/DL (ref 1.6–2.3)
MAGNESIUM SERPL-MCNC: 2.2 MG/DL (ref 1.6–2.3)
MCH RBC QN AUTO: 30.5 PG (ref 26.5–33)
MCHC RBC AUTO-ENTMCNC: 31 G/DL (ref 31.5–36.5)
MCV RBC AUTO: 99 FL (ref 78–100)
PLATELET # BLD AUTO: 239 10E9/L (ref 150–450)
POTASSIUM SERPL-SCNC: 3.7 MMOL/L (ref 3.4–5.3)
POTASSIUM SERPL-SCNC: 4.2 MMOL/L (ref 3.4–5.3)
POTASSIUM SERPL-SCNC: ABNORMAL MMOL/L (ref 3.4–5.3)
PROT SERPL-MCNC: 7.1 G/DL (ref 6.8–8.8)
RBC # BLD AUTO: 4.19 10E12/L (ref 4.4–5.9)
SODIUM SERPL-SCNC: 134 MMOL/L (ref 133–144)
SODIUM SERPL-SCNC: 138 MMOL/L (ref 133–144)
SODIUM SERPL-SCNC: ABNORMAL MMOL/L (ref 133–144)
WBC # BLD AUTO: 5.8 10E9/L (ref 4–11)

## 2020-07-06 PROCEDURE — 25000132 ZZH RX MED GY IP 250 OP 250 PS 637: Performed by: STUDENT IN AN ORGANIZED HEALTH CARE EDUCATION/TRAINING PROGRAM

## 2020-07-06 PROCEDURE — 25000132 ZZH RX MED GY IP 250 OP 250 PS 637: Performed by: SURGERY

## 2020-07-06 PROCEDURE — 21400000 ZZH R&B CCU UMMC

## 2020-07-06 PROCEDURE — 82310 ASSAY OF CALCIUM: CPT | Performed by: INTERNAL MEDICINE

## 2020-07-06 PROCEDURE — 36415 COLL VENOUS BLD VENIPUNCTURE: CPT | Performed by: INTERNAL MEDICINE

## 2020-07-06 PROCEDURE — 80048 BASIC METABOLIC PNL TOTAL CA: CPT | Performed by: STUDENT IN AN ORGANIZED HEALTH CARE EDUCATION/TRAINING PROGRAM

## 2020-07-06 PROCEDURE — 36415 COLL VENOUS BLD VENIPUNCTURE: CPT | Performed by: STUDENT IN AN ORGANIZED HEALTH CARE EDUCATION/TRAINING PROGRAM

## 2020-07-06 PROCEDURE — 84295 ASSAY OF SERUM SODIUM: CPT | Performed by: INTERNAL MEDICINE

## 2020-07-06 PROCEDURE — 99233 SBSQ HOSP IP/OBS HIGH 50: CPT | Mod: GC | Performed by: INTERNAL MEDICINE

## 2020-07-06 PROCEDURE — 70486 CT MAXILLOFACIAL W/O DYE: CPT

## 2020-07-06 PROCEDURE — 83735 ASSAY OF MAGNESIUM: CPT | Performed by: INTERNAL MEDICINE

## 2020-07-06 PROCEDURE — 85027 COMPLETE CBC AUTOMATED: CPT | Performed by: STUDENT IN AN ORGANIZED HEALTH CARE EDUCATION/TRAINING PROGRAM

## 2020-07-06 PROCEDURE — 80076 HEPATIC FUNCTION PANEL: CPT | Performed by: STUDENT IN AN ORGANIZED HEALTH CARE EDUCATION/TRAINING PROGRAM

## 2020-07-06 PROCEDURE — 00000146 ZZHCL STATISTIC GLUCOSE BY METER IP

## 2020-07-06 PROCEDURE — 25000132 ZZH RX MED GY IP 250 OP 250 PS 637: Performed by: INTERNAL MEDICINE

## 2020-07-06 PROCEDURE — 25000128 H RX IP 250 OP 636: Performed by: STUDENT IN AN ORGANIZED HEALTH CARE EDUCATION/TRAINING PROGRAM

## 2020-07-06 PROCEDURE — 83735 ASSAY OF MAGNESIUM: CPT | Performed by: STUDENT IN AN ORGANIZED HEALTH CARE EDUCATION/TRAINING PROGRAM

## 2020-07-06 PROCEDURE — 84132 ASSAY OF SERUM POTASSIUM: CPT | Performed by: INTERNAL MEDICINE

## 2020-07-06 RX ORDER — POTASSIUM CHLORIDE 750 MG/1
20 TABLET, EXTENDED RELEASE ORAL 2 TIMES DAILY
Status: DISCONTINUED | OUTPATIENT
Start: 2020-07-06 | End: 2020-07-07

## 2020-07-06 RX ADMIN — LOSARTAN POTASSIUM 50 MG: 50 TABLET, FILM COATED ORAL at 07:58

## 2020-07-06 RX ADMIN — Medication 37.5 MG: at 21:07

## 2020-07-06 RX ADMIN — ISOSORBIDE MONONITRATE 60 MG: 60 TABLET, EXTENDED RELEASE ORAL at 07:57

## 2020-07-06 RX ADMIN — Medication 37.5 MG: at 07:58

## 2020-07-06 RX ADMIN — Medication 37.5 MG: at 14:33

## 2020-07-06 RX ADMIN — POTASSIUM CHLORIDE 20 MEQ: 750 TABLET, EXTENDED RELEASE ORAL at 21:08

## 2020-07-06 RX ADMIN — ATORVASTATIN CALCIUM 40 MG: 40 TABLET, FILM COATED ORAL at 07:58

## 2020-07-06 RX ADMIN — POTASSIUM CHLORIDE 20 MEQ: 750 TABLET, EXTENDED RELEASE ORAL at 12:08

## 2020-07-06 RX ADMIN — ENOXAPARIN SODIUM 40 MG: 40 INJECTION SUBCUTANEOUS at 18:18

## 2020-07-06 RX ADMIN — POTASSIUM CHLORIDE 20 MEQ: 750 TABLET, EXTENDED RELEASE ORAL at 07:58

## 2020-07-06 RX ADMIN — ASPIRIN 81 MG CHEWABLE TABLET 81 MG: 81 TABLET CHEWABLE at 07:57

## 2020-07-06 ASSESSMENT — ACTIVITIES OF DAILY LIVING (ADL)
ADLS_ACUITY_SCORE: 12

## 2020-07-06 ASSESSMENT — MIFFLIN-ST. JEOR: SCORE: 1471.64

## 2020-07-06 NOTE — PROGRESS NOTES
[facility]   Cardiology II Service / Advanced Heart Failure  Progress note    Lucian Morillo  : 1955  MRN # [id number]    ADMIT DATE: 7/3/2020    Changes today  - Continue lasix drip at 15 mg/ hr   - Started LVAD/ Transplant work up with consults for Neuro/Psyc, CVTS, social, dental, palliative   - He has had a colonoscopy on 19   - Follow up with insurance about prior authorization.   - LVAD show and tell to be done during admission, LVAD coordinator on call paged  - Will reassess and document PVR once clinically euvolemic. He had PVR ~7.4 four days earlier while fluid overloaded. Plan for RHC on Wednesday (ordered). Anticipate discharge by the end of this week.    ASSESSMENT & PLAN:  Lucian Henderson Sr. is a 64 year old male with a PMH of HTN, DMII, obesity, CKD, CHANTEL, ischemic cardiomyopathy and severe LV systolic dysfunction s/p 3V CABG () and primary prevention ICD ( 4/2/15). Currently he presents with 3 days of worsening fatigue and SOB with minimal exertion. Patient claims he could walk 1-2 blocks last week but has been barely able to ambulate home recently.     Admitted for possible LVAD/Transplant work up.    # ICM s/p 3V CABG () and primary prevention ICD ( 4/2/15)  #HFrEF Stage D, NYHA III-IV  # Ambulatory cardiogenic shock  # Arrhythmia: HF associated VT/VF  Currently he presents with 3 days of worsening fatigue and SOB with minimal exertion, cardiac cachexia and cold extremities. Admitted for possible LVAD/Transplant work up. - Echocardiogram showed an EF of 15% on 20  - Diuresis : Lasix drip of 15 mg/hr   - ASA 81 mg daily  - Atorvastatin 40 daily  - Hydralazine 25 TID  - IMDUR 60 mg  - Losartan 50 mg   - Plan for advanced HF therapy evaluation.   - LVAD show and tell to be done during admission, LVAD coordinator on call paged  - Will reassess and document PVR once clinically euvolemic. He had PVR ~7.4 four days earlier while fluid  "overloaded. Plan for RHC on Wednesday (ordered).    # DMII ( A1c: 8.3)   - Finger sticks  - Hypoglycemia protocol   - Moderate intensity sliding scale insulin       #CKD  - Monitor Scr and K Q12h   - Avoid nephrotoxic agents    Floor Care  Fluids: 1.5L fluid restriction  Food: 2 gram sodium diet, low fat diet  Electrolyte repletion: potassium/magnesium sliding scale  DVT ppx: Lovenox  Glycemic Control: sliding scale insulin   Lines: PIV x1  Consults: none  Code Status: full    Discharge plan: inpatient admission, anticipate discharge TBD    Patient was discussed with attending physician, Dr. Arvin Sheridan MD.    Goldapon \"Mint\" Marvin  PGY-3 Internal Medicine  Pager [phone]    ..........................................................................................................................................................  Subjective:  NAEO, care team notes reviewed. Patient feels better in terms of breathing. No CP. Looking forward to going home.    PHYSICAL EXAM:  Blood pressure 109/77, temperature 98.6  F (37  C), temperature source Oral, resp. rate 20, height 1.626 m (5' 4\"), weight 81.9 kg (180 lb 8 oz), SpO2 99 %.  GENERAL: NAD  NECK: Supple and without lymphadenopathy. JVP 10-12cm  CV: S1/S2 heard without murmur   RESPIRATORY: CTAB  GI: Soft and distended. No tenderness, rebound, guarding. No palpable organomegaly.   EXTREMITIES: Peripheral edema trace. 2+ bilateral pedal pulses.   NEUROLOGIC: Alert and orientated x 3.   MUSCULOSKELETAL: No joint swelling or tenderness.   SKIN: No jaundice. No acute rashes or lesions.     ROS:   12-pt ROS otherwise negative except as noted above    PMH:  Past Medical History:   Diagnosis Date     CAD (coronary artery disease)      CHF (congestive heart failure) (H)      CKD (chronic kidney disease), stage III (H)      Cortical cataract of both eyes      Diabetes (H)      Hyperlipidemia      Hypertension      Ischemic cardiomyopathy      Obesity      CHANTEL " (obstructive sleep apnea)     occas cpap     Osteoarthritis        PSH:  Past Surgical History:   Procedure Laterality Date     C CABG, ARTERY-VEIN, THREE  02/2008     CATARACT IOL, RT/LT       COLONOSCOPY N/A 8/7/2019    Procedure: COLONOSCOPY, WITH POLYPECTOMY AND BIOPSY;  Surgeon: Chauncey Morataya MD;  Location:  GI     CV RIGHT HEART CATH N/A 3/25/2019    Procedure: CV RIGHT HEART CATH;  Surgeon: Moises Santos MD;  Location:  HEART CARDIAC CATH LAB     CV RIGHT HEART CATH N/A 7/10/2019    Procedure: CV RIGHT HEART CATH;  Surgeon: Jak Mccabe MD;  Location:  HEART CARDIAC CATH LAB     IMPLANT AUTOMATIC IMPLANTABLE CARDIOVERTER DEFIBRILLATOR       PHACOEMULSIFICATION CLEAR CORNEA WITH STANDARD IOL, VITRECTOMY PARSPLANA 25 GAGUE, COMBINED Left 12/10/2019    Procedure: PHACOEMULSIFICATION, CATARACT, CLEAR CORNEAL INCISION APPROACH, W STD INTRAOCULAR LENS IMPLANT INSERT + VITRECTOMY BY PARS PLANA  USING 25-GAUGE INSTRUMENTS. ENDOLASER, INFUSION OF 20% SF6 GAS;  Surgeon: Triny Bunch MD;  Location:  OR       MEDICATIONS:  Prior to Admission Medications   Prescriptions Last Dose Informant Patient Reported? Taking?   aspirin 81 MG tablet  Self No No   Sig: Take 1 tablet (81 mg) by mouth daily   atorvastatin (LIPITOR) 40 MG tablet  Self No No   Sig: Take 1 tablet (40 mg) by mouth daily   blood glucose (ACCU-CHEK SMARTVIEW) test strip  Self No No   Sig: USE TO TEST THREE TIMES DAILY AS DIRECTED   blood glucose (NO BRAND SPECIFIED) test strip  Self No No   Sig: Use to test blood sugar 2 times daily or as directed.   blood glucose monitoring (ACCU-CHEK FELIPE SMARTVIEW) meter device kit  Self No No   Sig: Use to test blood sugar 3-4 times daily, as directed.   blood glucose monitoring (ONE TOUCH DELICA) lancets  Self No No   Sig: Use to test blood sugars 2 times daily.   clopidogrel (PLAVIX) 75 MG tablet  Self No No   Sig: Take 1 tablet (75 mg) by mouth daily   exenatide ER (BYDUREON) 2 MG pen   Self No No   Sig: Inject 2 mg Subcutaneous every 7 days   furosemide (LASIX) 20 MG tablet  Self No No   Sig: Take 2 tablets (40 mg) by mouth 2 times daily   glimepiride (AMARYL) 4 MG tablet  Self No No   Sig: Take 1 tablet (4 mg) by mouth every morning (before breakfast)   hydrALAZINE (APRESOLINE) 25 MG tablet  Self No No   Sig: Take 1 tablet (25 mg) by mouth 3 times daily   insulin glargine (LANTUS SOLOSTAR) 100 UNIT/ML pen  Self No No   Sig: Take 8 units in the morning and 40 units in the evening   insulin pen needle (B-D U/F) 31G X 5 MM miscellaneous  Self No No   Si Units by Device route 2 times daily Use 2 pen needles daily or as directed.   isosorbide mononitrate (IMDUR) 60 MG 24 hr tablet  Self No No   Sig: Take 1 tablet (60 mg) by mouth daily   losartan (COZAAR) 50 MG tablet  Spouse/Significant Other No No   Sig: Take 1 tablet (50 mg) by mouth daily   nitroglycerin (NITROSTAT) 0.4 MG SL tablet  Self No No   Sig: Place 1 tablet (0.4 mg) under the tongue every 5 minutes as needed for chest pain If you are still having symptoms after 3 doses (15 minutes) call 911.   order for DME  Self No No   Sig: Auto CPAP 8-15 cmH20      Facility-Administered Medications Last Administration Doses Remaining   erythromycin (ROMYCIN) ophthalmic ointment None recorded    lidocaine (PF) (XYLOCAINE) 2 % injection 10 mL None recorded 1           ALLERGIES:     Allergies   Allergen Reactions     No Known Drug Allergies        FAMILY HISTORY:  Family History   Problem Relation Age of Onset     Diabetes Brother      Diabetes Sister      Diabetes Sister      Macular Degeneration No family hx of      Glaucoma No family hx of      Myocardial Infarction No family hx of      Kidney Disease No family hx of        SOCIAL HISTORY:  Social History     Socioeconomic History     Marital status:      Spouse name: Not on file     Number of children: 4     Years of education: Not on file     Highest education level: Not on file    Occupational History     Occupation:      Employer: [employer]     Employer: RETIRED   Social Needs     Financial resource strain: Not on file     Food insecurity     Worry: Not on file     Inability: Not on file     Transportation needs     Medical: Not on file     Non-medical: Not on file   Tobacco Use     Smoking status: Never Smoker     Smokeless tobacco: Never Used     Tobacco comment: Never smoked; non-smoking household   Substance and Sexual Activity     Alcohol use: No     Alcohol/week: 0.0 standard drinks     Drug use: No     Sexual activity: Yes     Partners: Female   Lifestyle     Physical activity     Days per week: Not on file     Minutes per session: Not on file     Stress: Not on file   Relationships     Social connections     Talks on phone: Not on file     Gets together: Not on file     Attends Baptism service: Not on file     Active member of club or organization: Not on file     Attends meetings of clubs or organizations: Not on file     Relationship status: Not on file     Intimate partner violence     Fear of current or ex partner: Not on file     Emotionally abused: Not on file     Physically abused: Not on file     Forced sexual activity: Not on file   Other Topics Concern     Parent/sibling w/ CABG, MI or angioplasty before 65F 55M? No   Social History Narrative     Not on file       LABS:  BMP  Recent Labs   Lab 07/06/20  1644 07/06/20  0615 07/06/20  0506 07/05/20  1743 07/05/20  0708    138 Unsatisfactory specimen - possible contamination 136 138   POTASSIUM 4.2 3.7 Unsatisfactory specimen - possible contamination 3.7 3.7   CHLORIDE 102  --  98 102 103   CO2 24  --  21 26 26   BUN 38*  --  36* 37* 33*   CR 1.76*  --  1.74* 1.79* 1.66*   *  --  140* 220* 129*   BRYANNA 8.8 8.8 Unsatisfactory specimen - possible contamination 8.5 8.8     CBC  Recent Labs   Lab 07/06/20  0506 07/05/20  0708 07/05/20  0623 07/03/20  1559   WBC 5.8 5.5 5.5 5.6   HGB 12.8* 12.6*  12.9* 12.2*   HCT 41.3 39.6* 41.1 37.7*   MCV 99 97 97 96    238 248 219     INR  Recent Labs   Lab 07/03/20  1559 07/02/20  0813   INR 1.12 1.09   PTT  --  29     IMAGING:    RHC 6/18/20     RA 20/26/18  RV 85/28  PA 82/45/58  PCWP 45  Cardiac output by Jacy: 3.85 L/min (indexed to 2.09 L/min/m2)  Cardiac output by thermodilution: 3.63 L/min (indexed to 1.97 L/min/m2)  SVR (by TD CO): 1123  PVR (by TD CO): 7.4    Angiogram 6/18/20   3 vessel CAD s/p 3V CABG in 2008 (LIMA-LAD, SVG-OM1, SVG-RPDA)  2. Known proximal SVG-RPDA occlusion  3. Presumed occlusion of SVG-OM1 (unable to locate on non-selective aortic root shot)  4. Patent LIMA-LAD with collateralization of LAD to PDA    Echocardiogram ( 3/11/2019)   Moderate to severe left ventricular dilation is present.  Severely (EF 10-20%) reduced left ventricular function is present.  No significant valve dysfunction.  Compared to study done 6/5/17 there is no significant change in LV size and  systolic function    Echocardiogram ( 7/5/2020)   Interpretation Summary  Severely (EF 10-20%) reduced left ventricular function is present. LVEF 15%  based on biplane 2D tracing. Severe left ventricular dilation is present.  LVIDd:6.8 cm. Grade III or advanced diastolic dysfunction.  The right ventricle is normal size.  Global right ventricular function is moderately reduced.  No significant valvular abnormalities were noted.  IVC diameter and respiratory changes fall into an intermediate range  suggesting an RA pressure of 8 mmHg.  Mild pulmonary hypertension is present.     This study was compared with the study from 3/25/2019. RV function has  worsened, LV size and function appear similar.    Devices  ICD ([employer]- 4/2/2015 )

## 2020-07-06 NOTE — PLAN OF CARE
D: Pt admit 7/3/20 for 3 days of worsening fatigue/SOB w/minimal exertion. Pt now undergoing LVAD/transplant workup. PMH HTN, DM2, Obesity, CKD, CHANTEL, ICM, and severe LV systolic dysfunction s/p 3V CABG (2008) and primary prevention ICD (4/2/15)    I/A:   Neuro: A&Ox4. Maori speaking, able to make needs known.   VS: VSS. RA  Tele: SR  Pain: Denies  GI/: Urinating adequately into bedside urinal, no BM this shift. PRN senna administered per pt request. LBM 7/3 per pt report  Diet: 2g Na  BG/insulin: BG checks ACHS  IV/Drips: R PIV infusing lasix gtt max concentration 15 mg/hr  Activity: Independent  Skin/drains: Skin intact   Evening K+ replaced per protocol    P: Plan to Continue to monitor pt status and report changes to Cards 2.     Nicolette Lockett RN

## 2020-07-06 NOTE — PLAN OF CARE
D/I/A: Pt here w/ CHF exacerbation now getting worked up for LVAD vs OHT. Lasix continues at 15mg/hr. K replaced. SR/ST, VSS, RA.  P: Possible RHC in the next few days. LVAD coordinator planning show and tell Tuesday at 1030. Pt's wife Shivani aware.

## 2020-07-06 NOTE — CONSULTS
Dental Service Consultation        Lucian Henderson . MRN# [id number]   YOB: 1955 Age: 64 year old   Date of Admission: 7/3/2020     Reason for consult: I was asked by Dr. Dillard to evaluate this patient for Dental clearance for Heart Transplant.           Assessment and Plan:   Assessment: #1, 32 impacted third molars. PARL #11 and #13. #29 crown fractured, endodontically treated tooth.     Plan:  Extraction recommended for #11, 13, 29. Evaluate remaining teeth with caries to determine restorability, additional radiographs required.            Chief Complaint:   None    History Obtained from patient         History of Present Illness:   This patient is a 64 year old male who is admitted for Heart transplant workup.              Past Medical History:   See H&P  Past Medical History:   Diagnosis Date     CAD (coronary artery disease)      CHF (congestive heart failure) (H)      CKD (chronic kidney disease), stage III (H)      Cortical cataract of both eyes      Diabetes (H)      Hyperlipidemia      Hypertension      Ischemic cardiomyopathy      Obesity      CHANTEL (obstructive sleep apnea)     occas cpap     Osteoarthritis              Past Surgical History:   See H&P  Past Surgical History:   Procedure Laterality Date     C CABG, ARTERY-VEIN, THREE  02/2008     CATARACT IOL, RT/LT       COLONOSCOPY N/A 8/7/2019    Procedure: COLONOSCOPY, WITH POLYPECTOMY AND BIOPSY;  Surgeon: Chauncey Morataya MD;  Location:  GI     CV RIGHT HEART CATH N/A 3/25/2019    Procedure: CV RIGHT HEART CATH;  Surgeon: Moises Santos MD;  Location:  HEART CARDIAC CATH LAB     CV RIGHT HEART CATH N/A 7/10/2019    Procedure: CV RIGHT HEART CATH;  Surgeon: Jak Mccabe MD;  Location:  HEART CARDIAC CATH LAB     IMPLANT AUTOMATIC IMPLANTABLE CARDIOVERTER DEFIBRILLATOR       PHACOEMULSIFICATION CLEAR CORNEA WITH STANDARD IOL, VITRECTOMY PARSPLANA 25 GAGUE, COMBINED Left 12/10/2019    Procedure:  PHACOEMULSIFICATION, CATARACT, CLEAR CORNEAL INCISION APPROACH, W STD INTRAOCULAR LENS IMPLANT INSERT + VITRECTOMY BY PARS PLANA  USING 25-GAUGE INSTRUMENTS. ENDOLASER, INFUSION OF 20% SF6 GAS;  Surgeon: Triny Bunch MD;  Location:  OR               Social History:     Social History     Tobacco Use     Smoking status: Never Smoker     Smokeless tobacco: Never Used     Tobacco comment: Never smoked; non-smoking household   Substance Use Topics     Alcohol use: No     Alcohol/week: 0.0 standard drinks             Family History:     Family History   Problem Relation Age of Onset     Diabetes Brother      Diabetes Sister      Diabetes Sister      Macular Degeneration No family hx of      Glaucoma No family hx of      Myocardial Infarction No family hx of      Kidney Disease No family hx of              Immunizations:     Immunization History   Administered Date(s) Administered     Influenza (IIV3) PF 10/05/2011, 10/15/2012     Influenza Vaccine IM > 6 months Valent IIV4 10/27/2015, 09/14/2016, 10/05/2017, 10/03/2018     Pneumococcal 23 valent 08/04/2009, 04/03/2015     TDAP Vaccine (Adacel) 08/04/2009             Allergies:     Allergies   Allergen Reactions     No Known Drug Allergies              Medications:     Current Facility-Administered Medications Ordered in Epic   Medication Dose Route Frequency Last Rate Last Dose     acetaminophen (TYLENOL) tablet 650 mg  650 mg Oral Q4H PRN         aspirin (ASA) chewable tablet 81 mg  81 mg Oral Daily   81 mg at 07/06/20 0757     atorvastatin (LIPITOR) tablet 40 mg  40 mg Oral Daily   40 mg at 07/06/20 0758     glucose gel 15-30 g  15-30 g Oral Q15 Min PRN        Or     dextrose 50 % injection 25-50 mL  25-50 mL Intravenous Q15 Min PRN        Or     glucagon injection 1 mg  1 mg Subcutaneous Q15 Min PRN         enoxaparin ANTICOAGULANT (LOVENOX) injection 40 mg  40 mg Subcutaneous Q24H   40 mg at 07/05/20 1747     furosemide (LASIX) 500 mg/50mL infusion  ADULT MAX CONC  15 mg/hr Intravenous Continuous 1.5 mL/hr at 07/05/20 1956 15 mg/hr at 07/05/20 1956     hydrALAZINE (APRESOLINE) half-tab 37.5 mg  37.5 mg Oral TID   37.5 mg at 07/06/20 0758     insulin aspart (NovoLOG) injection (RAPID ACTING)  1-7 Units Subcutaneous TID AC   1 Units at 07/06/20 0853     insulin aspart (NovoLOG) injection (RAPID ACTING)  1-5 Units Subcutaneous At Bedtime   1 Units at 07/04/20 2222     isosorbide mononitrate (IMDUR) 24 hr tablet 60 mg  60 mg Oral Daily   60 mg at 07/06/20 0757     lidocaine (LMX4) cream   Topical Q1H PRN         lidocaine 1 % 0.1-1 mL  0.1-1 mL Other Q1H PRN         losartan (COZAAR) tablet 50 mg  50 mg Oral Daily   50 mg at 07/06/20 0758     magnesium sulfate 2 g in water intermittent infusion  2 g Intravenous Daily PRN         magnesium sulfate 4 g in 100 mL sterile water (premade)  4 g Intravenous Q4H PRN         medication instruction   Does not apply Continuous PRN         nitroGLYcerin (NITROSTAT) sublingual tablet 0.4 mg  0.4 mg Sublingual Q5 Min PRN         potassium chloride (KLOR-CON) Packet 20-40 mEq  20-40 mEq Oral or Feeding Tube Q2H PRN         potassium chloride 10 mEq in 100 mL intermittent infusion with 10 mg lidocaine  10 mEq Intravenous Q1H PRN         potassium chloride 10 mEq in 100 mL sterile water intermittent infusion (premix)  10 mEq Intravenous Q1H PRN         potassium chloride 20 mEq in 50 mL intermittent infusion  20 mEq Intravenous Q1H PRN         potassium chloride ER (KLOR-CON M) CR tablet 20 mEq  20 mEq Oral BID   20 mEq at 07/06/20 1208     potassium chloride ER (KLOR-CON M) CR tablet 20-40 mEq  20-40 mEq Oral Q2H PRN   20 mEq at 07/06/20 0758     senna-docusate (SENOKOT-S/PERICOLACE) 8.6-50 MG per tablet 1 tablet  1 tablet Oral BID PRN        Or     senna-docusate (SENOKOT-S/PERICOLACE) 8.6-50 MG per tablet 2 tablet  2 tablet Oral BID PRN   2 tablet at 07/05/20 2341     sodium chloride (PF) 0.9% PF flush 10 mL  10 mL Intracatheter  Once         sodium chloride (PF) 0.9% PF flush 3 mL  3 mL Intracatheter q1 min prn         sodium chloride (PF) 0.9% PF flush 3 mL  3 mL Intracatheter Q8H   3 mL at 07/04/20 0927     No current [facility]-ordered outpatient medications on file.             Review of Systems:   The 10 point Review of Systems is negative other than noted in the HPI            Physical Exam:   Vitals were reviewed  Temp: 97.7  F (36.5  C) Temp src: Oral BP: 106/69 Pulse: 100 Heart Rate: 96 Resp: 16 SpO2: 98 % O2 Device: None (Room air)      Head and neck exam: No extraoral swelling or discoloration. No lymphadenopathy or trismus.    Intraoral exam: Caries on multiple teeth, severe periodontal disease, no intraoral lesions. #11, 13 asymptomatic. Negative to percussion and palpation.       Data:   Radiographic interpretation: Orthopantomogram taken on 07/05/2020  Osseous pathology: None  Pulpal Pathology: None  Periodontal Pathology: PARL #11, 13.  Caries: #4, 11, 13  Odontogenic pathology: None    The patient was discussed with: Yomaira Holder DDS, David Bass DMD  PGY1  Pager: [phone]-

## 2020-07-06 NOTE — CONSULTS
Patient of Dr. Diallo seen in clinic for psychosocial assessment.   60 minutes spent with patient. 100% of visit consisted of counseling related to issues surrounding LVAD and Heart Transplant.    Psychosocial Assessment   Name: Lucian Henderson Sr.     MRN:  [id number]        Patient Name / Age / Race: Lucian Henderson Sr. 64 year old  and    Source of Information: Patient and his wife, Shivani, present at bedside    : Quyen Turcios [facility]   Interview Date: 2020   Reason for Referral: Heart Transplant and Mechanical Circulatory Support     Current Living Situation   Location ([facility]/State): [address] [place] [id number]   With Whom: Living arrangements - the patient lives with their spouse.   Type of Home: single family   Working Phone? Yes    [employer] Pr[employer]ed Outlet? Yes    Distance from Hospital: 25 minutes    Need to Relocate Post Surgery? No   Discussed? Yes      Vocational/Employment/Financial   Employment: Retired 4 yrs ago   Occupation: Pt has worked at the [employer], [employer] and as a    Education: 12yrs   ? No   Income: Pt and wife's social security    Insurance: Medicare   Name of Private Insurance: [employer]    Medication Coverage: Yes    In current situation, pt. can afford medication and supply costs:  Yes    Other Financial Concerns/Issues: No concerns.      Family/Social Support   Marital Status:    Partner Name: Shivani   Length of Time : 43yrs    Partner s Employment: Retired-was a cook at [employer]    Relationship: stable/supportive   Children: 4 Adult Children: Lucian Bergman (lives locally), Elisabeth ([place]), Triny ([place]) and Silvestre ([place])   Parents:     Siblings: Pt has 5 sisters and 4 brothers    Other Family or Friends Close by: None    Support System: available, helpful   Primary Support Person: Pt's wife   Issues: Explained caregiver duties and  responsibilities including 24hr caregiver support for the first 30 days following hospital discharge. Wife is motivated to perform caregiver duties and responsibilities.      Activities/Functional Ability   Current Level: ambulatory and independent with ADL's   Daily Activities: Pt reports he is limited by low exercise tolerance and endurance. Up until March pt was going to the gym 3-4x/wk and doing the treadmill, bike and swimming   Transportation: self      Medical Status   Patient s understanding of need for surgical intervention: Pt started initial LVAD/Heart Transplant evaluation last year.     Advanced Directive? No    Details: requested copy of HCD ([place]istani Version)   Adherence History: Pt had poor follow-up and did not complete the evaluation process. We talk about this and pt endorsed fear and anxiety with moving forward. Pt reports he is ready to move forward because he has not been feeling well. Last year he felt well and was not limited in what he could do at that time.   Ability to Adhere to Complex Medical Regime: Did not complete f/u last year during initial VAD/heart transplant evaluation     Behavioral   Chemical Dependency Issues: No: Pt denies hx of ETOH or drug use.    Smoker? No   Psychiatric impairment: No: Pt denies hx of mental health concerns, inpatient psychiatric admissions, suicide attempts or taking medication for a mental health diagnosis   Coping Style/Strategies: Being active, his wife   Recent Legal History: No   Teaching Completed During Assessment Related To Transplant and Mechanical Circulatory Support: 1. Housing and relocation needs post surgery.  2. Caregiver needs post surgery.  3. Financial issues related to surgery.  4. Risks of alcohol use post surgery.  5. Common psychosocial stressors pre/post surgery.  6. Support group availability.   Psychosocial Risks Reviewed Related To Transplant and Mechanical Circulatory Support: 1. Increased stress related to your emotional,  family, social, employment, or financial situation.  2. Affect on work and/or disability benefits.  3. Affect on future health and life insurance.  4. Outcome expectations may not be met.  5. Mental Health risks: anxiety, depression, PTSD, guilt, grief and chronic fatigue.     Observed Behavior   Well informed? Yes  Angry? No   Process info well? Yes  Hostile? No   Evasive? No Oriented X3? Yes    Cautious/Suspicious? No Motivated? Yes    Appropriate Behavior? Yes  Depressed? Yes    Appropriate Affect? Yes  Anxious? Yes    Interview Observations: Pt and wife pleasant and engaged throughout assessment. Pt was open and sincere as we talked about what happened last year during evaluation and not following through with appts. Pt was not defensive or guarded. Pt got emotional as he talked about the fear and anxiety he had at that time.      Selection Criteria Met   Plan for Support Yes    Chemical Dependence Yes    Smoking Yes    Mental Health Yes    Adequate Finances Yes      Risk Issues:    Compliance: Pt had poor follow-up with appointments last year during LVAD/Heart Transplant Evaluation.     Final Evaluation/Assessment:     Pt and wife pleasant and engaged in discussion and asked many good questions.  was used. Pt also appeared sincere and regretful that he did not follow-up with appts and communication last year during evaluation. Pt reported he was anxious and fearful at the time and was also feeling well enough that he was still going to the gym. Pt reports he has not been feeling well for a few months and is now agreeable to moving forward with evaluation and is open to an LVAD or heart transplant. Pt still has fear and is anxious, but wants to have many more years with his family.     Pt's caregiver plan will consist of his wife, Shivani. Wife appears motivated and engaged in learning about caregiver role and responsibilities.     There are no concerns in the areas of chemical dependency,  smoking or mental health.     From a psychosocial perspective, pt is an acceptable candidate to proceed with LVAD or Heart Transplant.     Patient understands risks and benefits of Heart Transplant and Mechanical Circulatory Support: Yes     Recommendation:    acceptable    Signature: Quyen Turcios [facility]     Title: Licensed Independent Clinical                Interview Date: July 6, 2020

## 2020-07-07 ENCOUNTER — ANESTHESIA (OUTPATIENT)
Dept: SURGERY | Facility: CLINIC | Age: 65
End: 2020-07-07

## 2020-07-07 ENCOUNTER — PREP FOR PROCEDURE (OUTPATIENT)
Dept: CARDIOLOGY | Facility: CLINIC | Age: 65
End: 2020-07-07

## 2020-07-07 ENCOUNTER — APPOINTMENT (OUTPATIENT)
Dept: ULTRASOUND IMAGING | Facility: CLINIC | Age: 65
DRG: 001 | End: 2020-07-07
Attending: STUDENT IN AN ORGANIZED HEALTH CARE EDUCATION/TRAINING PROGRAM
Payer: COMMERCIAL

## 2020-07-07 ENCOUNTER — ANESTHESIA EVENT (OUTPATIENT)
Dept: SURGERY | Facility: CLINIC | Age: 65
End: 2020-07-07

## 2020-07-07 ENCOUNTER — APPOINTMENT (OUTPATIENT)
Dept: CARDIOLOGY | Facility: CLINIC | Age: 65
DRG: 001 | End: 2020-07-07
Attending: STUDENT IN AN ORGANIZED HEALTH CARE EDUCATION/TRAINING PROGRAM
Payer: COMMERCIAL

## 2020-07-07 DIAGNOSIS — I50.9 HEART FAILURE (H): Primary | ICD-10-CM

## 2020-07-07 LAB
ALBUMIN SERPL-MCNC: 3.2 G/DL (ref 3.4–5)
ALP SERPL-CCNC: 116 U/L (ref 40–150)
ALT SERPL W P-5'-P-CCNC: 17 U/L (ref 0–70)
ANION GAP SERPL CALCULATED.3IONS-SCNC: 10 MMOL/L (ref 3–14)
ANION GAP SERPL CALCULATED.3IONS-SCNC: 8 MMOL/L (ref 3–14)
AST SERPL W P-5'-P-CCNC: 15 U/L (ref 0–45)
BILIRUB DIRECT SERPL-MCNC: 0.2 MG/DL (ref 0–0.2)
BILIRUB SERPL-MCNC: 0.9 MG/DL (ref 0.2–1.3)
BUN SERPL-MCNC: 37 MG/DL (ref 7–30)
BUN SERPL-MCNC: 45 MG/DL (ref 7–30)
CALCIUM SERPL-MCNC: 9 MG/DL (ref 8.5–10.1)
CALCIUM SERPL-MCNC: 9 MG/DL (ref 8.5–10.1)
CHLORIDE SERPL-SCNC: 102 MMOL/L (ref 94–109)
CHLORIDE SERPL-SCNC: 102 MMOL/L (ref 94–109)
CO2 SERPL-SCNC: 22 MMOL/L (ref 20–32)
CO2 SERPL-SCNC: 24 MMOL/L (ref 20–32)
CREAT SERPL-MCNC: 1.82 MG/DL (ref 0.66–1.25)
CREAT SERPL-MCNC: 1.97 MG/DL (ref 0.66–1.25)
ERYTHROCYTE [DISTWIDTH] IN BLOOD BY AUTOMATED COUNT: 15.8 % (ref 10–15)
ERYTHROCYTE [DISTWIDTH] IN BLOOD BY AUTOMATED COUNT: 15.8 % (ref 10–15)
GFR SERPL CREATININE-BSD FRML MDRD: 34 ML/MIN/{1.73_M2}
GFR SERPL CREATININE-BSD FRML MDRD: 38 ML/MIN/{1.73_M2}
GLUCOSE BLDC GLUCOMTR-MCNC: 200 MG/DL (ref 70–99)
GLUCOSE BLDC GLUCOMTR-MCNC: 205 MG/DL (ref 70–99)
GLUCOSE BLDC GLUCOMTR-MCNC: 247 MG/DL (ref 70–99)
GLUCOSE BLDC GLUCOMTR-MCNC: 267 MG/DL (ref 70–99)
GLUCOSE SERPL-MCNC: 190 MG/DL (ref 70–99)
GLUCOSE SERPL-MCNC: 259 MG/DL (ref 70–99)
HCT VFR BLD AUTO: 40.7 % (ref 40–53)
HCT VFR BLD AUTO: 42 % (ref 40–53)
HGB BLD-MCNC: 12.9 G/DL (ref 13.3–17.7)
HGB BLD-MCNC: 13 G/DL (ref 13.3–17.7)
INTERPRETATION ECG - MUSE: NORMAL
INTERPRETATION ECG - MUSE: NORMAL
LMWH PPP CHRO-ACNC: 0.31 IU/ML
MAGNESIUM SERPL-MCNC: 2.1 MG/DL (ref 1.6–2.3)
MAGNESIUM SERPL-MCNC: 2.2 MG/DL (ref 1.6–2.3)
MAGNESIUM SERPL-MCNC: 2.2 MG/DL (ref 1.6–2.3)
MCH RBC QN AUTO: 30.2 PG (ref 26.5–33)
MCH RBC QN AUTO: 30.6 PG (ref 26.5–33)
MCHC RBC AUTO-ENTMCNC: 31 G/DL (ref 31.5–36.5)
MCHC RBC AUTO-ENTMCNC: 31.7 G/DL (ref 31.5–36.5)
MCV RBC AUTO: 96 FL (ref 78–100)
MCV RBC AUTO: 97 FL (ref 78–100)
PLATELET # BLD AUTO: 246 10E9/L (ref 150–450)
PLATELET # BLD AUTO: 267 10E9/L (ref 150–450)
POTASSIUM SERPL-SCNC: 4 MMOL/L (ref 3.4–5.3)
POTASSIUM SERPL-SCNC: 4.1 MMOL/L (ref 3.4–5.3)
POTASSIUM SERPL-SCNC: 5 MMOL/L (ref 3.4–5.3)
PROT SERPL-MCNC: 7.4 G/DL (ref 6.8–8.8)
RBC # BLD AUTO: 4.22 10E12/L (ref 4.4–5.9)
RBC # BLD AUTO: 4.31 10E12/L (ref 4.4–5.9)
SARS-COV-2 PCR COMMENT: NORMAL
SARS-COV-2 RNA SPEC QL NAA+PROBE: NEGATIVE
SARS-COV-2 RNA SPEC QL NAA+PROBE: NORMAL
SODIUM SERPL-SCNC: 133 MMOL/L (ref 133–144)
SODIUM SERPL-SCNC: 136 MMOL/L (ref 133–144)
SPECIMEN SOURCE: NORMAL
SPECIMEN SOURCE: NORMAL
TSH SERPL DL<=0.005 MIU/L-ACNC: 1.3 MU/L (ref 0.4–4)
WBC # BLD AUTO: 5.9 10E9/L (ref 4–11)
WBC # BLD AUTO: 6.5 10E9/L (ref 4–11)

## 2020-07-07 PROCEDURE — 93005 ELECTROCARDIOGRAM TRACING: CPT

## 2020-07-07 PROCEDURE — 36415 COLL VENOUS BLD VENIPUNCTURE: CPT | Performed by: STUDENT IN AN ORGANIZED HEALTH CARE EDUCATION/TRAINING PROGRAM

## 2020-07-07 PROCEDURE — 99233 SBSQ HOSP IP/OBS HIGH 50: CPT | Mod: 25 | Performed by: INTERNAL MEDICINE

## 2020-07-07 PROCEDURE — 25000132 ZZH RX MED GY IP 250 OP 250 PS 637: Performed by: SURGERY

## 2020-07-07 PROCEDURE — 40000556 ZZH STATISTIC PERIPHERAL IV START W US GUIDANCE

## 2020-07-07 PROCEDURE — 93970 EXTREMITY STUDY: CPT | Mod: XS

## 2020-07-07 PROCEDURE — 36415 COLL VENOUS BLD VENIPUNCTURE: CPT | Performed by: INTERNAL MEDICINE

## 2020-07-07 PROCEDURE — 20000004 ZZH R&B ICU UMMC

## 2020-07-07 PROCEDURE — 93930 UPPER EXTREMITY STUDY: CPT

## 2020-07-07 PROCEDURE — 25000128 H RX IP 250 OP 636

## 2020-07-07 PROCEDURE — 25000125 ZZHC RX 250

## 2020-07-07 PROCEDURE — 84132 ASSAY OF SERUM POTASSIUM: CPT

## 2020-07-07 PROCEDURE — 25000132 ZZH RX MED GY IP 250 OP 250 PS 637: Performed by: STUDENT IN AN ORGANIZED HEALTH CARE EDUCATION/TRAINING PROGRAM

## 2020-07-07 PROCEDURE — 25000125 ZZHC RX 250: Performed by: STUDENT IN AN ORGANIZED HEALTH CARE EDUCATION/TRAINING PROGRAM

## 2020-07-07 PROCEDURE — 93010 ELECTROCARDIOGRAM REPORT: CPT | Mod: 76 | Performed by: INTERNAL MEDICINE

## 2020-07-07 PROCEDURE — 85027 COMPLETE CBC AUTOMATED: CPT

## 2020-07-07 PROCEDURE — 36415 COLL VENOUS BLD VENIPUNCTURE: CPT

## 2020-07-07 PROCEDURE — U0003 INFECTIOUS AGENT DETECTION BY NUCLEIC ACID (DNA OR RNA); SEVERE ACUTE RESPIRATORY SYNDROME CORONAVIRUS 2 (SARS-COV-2) (CORONAVIRUS DISEASE [COVID-19]), AMPLIFIED PROBE TECHNIQUE, MAKING USE OF HIGH THROUGHPUT TECHNOLOGIES AS DESCRIBED BY CMS-2020-01-R: HCPCS | Performed by: STUDENT IN AN ORGANIZED HEALTH CARE EDUCATION/TRAINING PROGRAM

## 2020-07-07 PROCEDURE — 84443 ASSAY THYROID STIM HORMONE: CPT | Performed by: STUDENT IN AN ORGANIZED HEALTH CARE EDUCATION/TRAINING PROGRAM

## 2020-07-07 PROCEDURE — 93970 EXTREMITY STUDY: CPT

## 2020-07-07 PROCEDURE — 00000146 ZZHCL STATISTIC GLUCOSE BY METER IP

## 2020-07-07 PROCEDURE — 85520 HEPARIN ASSAY: CPT | Performed by: INTERNAL MEDICINE

## 2020-07-07 PROCEDURE — 80076 HEPATIC FUNCTION PANEL: CPT | Performed by: STUDENT IN AN ORGANIZED HEALTH CARE EDUCATION/TRAINING PROGRAM

## 2020-07-07 PROCEDURE — 80048 BASIC METABOLIC PNL TOTAL CA: CPT | Performed by: STUDENT IN AN ORGANIZED HEALTH CARE EDUCATION/TRAINING PROGRAM

## 2020-07-07 PROCEDURE — 25800030 ZZH RX IP 258 OP 636: Performed by: STUDENT IN AN ORGANIZED HEALTH CARE EDUCATION/TRAINING PROGRAM

## 2020-07-07 PROCEDURE — 25000128 H RX IP 250 OP 636: Performed by: INTERNAL MEDICINE

## 2020-07-07 PROCEDURE — 83735 ASSAY OF MAGNESIUM: CPT | Performed by: STUDENT IN AN ORGANIZED HEALTH CARE EDUCATION/TRAINING PROGRAM

## 2020-07-07 PROCEDURE — 25000128 H RX IP 250 OP 636: Performed by: STUDENT IN AN ORGANIZED HEALTH CARE EDUCATION/TRAINING PROGRAM

## 2020-07-07 PROCEDURE — 83735 ASSAY OF MAGNESIUM: CPT

## 2020-07-07 PROCEDURE — 85027 COMPLETE CBC AUTOMATED: CPT | Performed by: STUDENT IN AN ORGANIZED HEALTH CARE EDUCATION/TRAINING PROGRAM

## 2020-07-07 RX ORDER — POTASSIUM CHLORIDE 750 MG/1
40 TABLET, EXTENDED RELEASE ORAL
Status: CANCELLED | OUTPATIENT
Start: 2020-07-07

## 2020-07-07 RX ORDER — NALOXONE HYDROCHLORIDE 0.4 MG/ML
.1-.4 INJECTION, SOLUTION INTRAMUSCULAR; INTRAVENOUS; SUBCUTANEOUS
Status: DISCONTINUED | OUTPATIENT
Start: 2020-07-07 | End: 2020-07-14

## 2020-07-07 RX ORDER — MAGNESIUM SULFATE HEPTAHYDRATE 40 MG/ML
2 INJECTION, SOLUTION INTRAVENOUS
Status: CANCELLED | OUTPATIENT
Start: 2020-07-07

## 2020-07-07 RX ORDER — FENTANYL CITRATE 50 UG/ML
50 INJECTION, SOLUTION INTRAMUSCULAR; INTRAVENOUS
Status: DISCONTINUED | OUTPATIENT
Start: 2020-07-07 | End: 2020-07-07

## 2020-07-07 RX ORDER — POTASSIUM CHLORIDE 750 MG/1
20 TABLET, EXTENDED RELEASE ORAL
Status: CANCELLED | OUTPATIENT
Start: 2020-07-07

## 2020-07-07 RX ORDER — METOPROLOL TARTRATE 1 MG/ML
5 INJECTION, SOLUTION INTRAVENOUS EVERY 10 MIN PRN
Status: DISCONTINUED | OUTPATIENT
Start: 2020-07-07 | End: 2020-07-15

## 2020-07-07 RX ORDER — HEPARIN SODIUM 10000 [USP'U]/100ML
0-3500 INJECTION, SOLUTION INTRAVENOUS CONTINUOUS
Status: DISCONTINUED | OUTPATIENT
Start: 2020-07-07 | End: 2020-07-14

## 2020-07-07 RX ORDER — DIGOXIN 0.25 MG/ML
250 INJECTION INTRAMUSCULAR; INTRAVENOUS ONCE
Status: COMPLETED | OUTPATIENT
Start: 2020-07-08 | End: 2020-07-08

## 2020-07-07 RX ORDER — DEXTROSE MONOHYDRATE 25 G/50ML
25-50 INJECTION, SOLUTION INTRAVENOUS
Status: DISCONTINUED | OUTPATIENT
Start: 2020-07-07 | End: 2020-07-07

## 2020-07-07 RX ORDER — DIGOXIN 0.25 MG/ML
250 INJECTION INTRAMUSCULAR; INTRAVENOUS ONCE
Status: COMPLETED | OUTPATIENT
Start: 2020-07-07 | End: 2020-07-07

## 2020-07-07 RX ORDER — NICOTINE POLACRILEX 4 MG
15-30 LOZENGE BUCCAL
Status: DISCONTINUED | OUTPATIENT
Start: 2020-07-07 | End: 2020-07-07

## 2020-07-07 RX ORDER — NOREPINEPHRINE BITARTRATE 0.06 MG/ML
0.03-0.4 INJECTION, SOLUTION INTRAVENOUS CONTINUOUS
Status: DISCONTINUED | OUTPATIENT
Start: 2020-07-07 | End: 2020-07-07

## 2020-07-07 RX ADMIN — Medication 15 MG/HR: at 05:06

## 2020-07-07 RX ADMIN — AMIODARONE HYDROCHLORIDE 1 MG/MIN: 50 INJECTION, SOLUTION INTRAVENOUS at 12:36

## 2020-07-07 RX ADMIN — POTASSIUM CHLORIDE 20 MEQ: 750 TABLET, EXTENDED RELEASE ORAL at 08:40

## 2020-07-07 RX ADMIN — HEPARIN SODIUM 950 UNITS/HR: 10000 INJECTION, SOLUTION INTRAVENOUS at 01:46

## 2020-07-07 RX ADMIN — SODIUM CHLORIDE, POTASSIUM CHLORIDE, SODIUM LACTATE AND CALCIUM CHLORIDE 250 ML: 600; 310; 30; 20 INJECTION, SOLUTION INTRAVENOUS at 09:03

## 2020-07-07 RX ADMIN — AMIODARONE HYDROCHLORIDE 1 MG/MIN: 50 INJECTION, SOLUTION INTRAVENOUS at 16:45

## 2020-07-07 RX ADMIN — ASPIRIN 81 MG CHEWABLE TABLET 81 MG: 81 TABLET CHEWABLE at 08:40

## 2020-07-07 RX ADMIN — ATORVASTATIN CALCIUM 40 MG: 40 TABLET, FILM COATED ORAL at 08:40

## 2020-07-07 RX ADMIN — DIGOXIN 250 MCG: 0.25 INJECTION INTRAMUSCULAR; INTRAVENOUS at 17:43

## 2020-07-07 RX ADMIN — METOPROLOL TARTRATE 5 MG: 1 INJECTION, SOLUTION INTRAVENOUS at 01:39

## 2020-07-07 RX ADMIN — AMIODARONE HYDROCHLORIDE 150 MG: 1.5 INJECTION, SOLUTION INTRAVENOUS at 11:58

## 2020-07-07 ASSESSMENT — ACTIVITIES OF DAILY LIVING (ADL)
ADLS_ACUITY_SCORE: 12

## 2020-07-07 ASSESSMENT — MIFFLIN-ST. JEOR: SCORE: 1473

## 2020-07-07 NOTE — PROGRESS NOTES
Brief Crosscover note    Was paged by nurse with concern that patient had A. fib with RVR, and was feeling some lightheadedness.  Got EKG which showed a flutter with rates around 150.  Discussed with cardiology fellow, decision made to start heparin and discuss with day team for possible cardioversion.  I did try 5 mg IV beta-blocker, but this significantly lowered the patient's blood pressure.  Later he had nonsustained ventricular tachycardia with one blood pressure reading that was 75/50.  Mr. Oliveira confirmed that he was feeling more lightheaded with the runs of ventricular tachycardia.  Asked that the patient move from the chair that he was sitting into the bed, repeated his blood pressure which was 103/85.  Checking magnesium and potassium.  The patient has an ICD.  He is n.p.o., and will consider possible cardioversion.  -Follow-up electrolytes  -Started low intensity heparin  -Consider cardioversion  -If he remains symptomatic, will start amiodarone, but I also worry about this drop in his blood pressure    Mehrdad Kincaid MD MPH  PGY3

## 2020-07-07 NOTE — PLAN OF CARE
Pt admitted 7/3 with 3 days of fatigue and SOB.  Pt went into SVT around 2300 (look like Afib at beginning r/t irregularity).  Tried vagal maneuver of pinching nose and blowing, but ineffective.  Cards notified and ordered a 12 Lead.  One dose of IV Metoprolol given, but pressure tank.  Pt wanted to get out of bed around 200 and around 330 had about 5 episodes of VT (longest 39 beats at 288 bpm).  Electrolytes drawn and needs 20 mEq of K.  Lasix gtt stopped around 645 and was running at 15 mg/hr.  Heparin gtt also started and recheck at 800.  Did not get am weight r/t recent VT.  Otherwise, pt sept very little and up independently.  Continue to monitor and with POC.

## 2020-07-07 NOTE — PROGRESS NOTES
Transfer  Transferred to:4C  Via:bed  Reason for transfer: Pt inappropriate for 6C- worsening patient condition  Family: Aware of transfer  Belongings: Sent with pt  Chart: Sent with pt  Medications: Meds from bin sent with pt  Report called to: AURORA Dhillon, all questions answered.    Cannot get cardioversion today per Echo lab due to no current Covid test.

## 2020-07-07 NOTE — CONSULTS
"I was consulted to evaluate patient for options for end stage heart failure.    HPI: Lucian Henderson Sr. is a 64 year old male with a PMH of HTN, DMII, obesity, CKD, CHANTEL, ischemic cardiomyopathy and severe LV systolic dysfunction  s/p 3V CABG (2008) and primary prevention ICD ( 4/2/15). His device remotely transmitted data after his arrival in [place] on 06/20.This showed that he had 19 shocks for VT VF for 4 days from May 22-26.  His last shock was on May 26.  He did not seek any medical attention.  He did not lose consciousness.  He felt only 3 shocks. As part of his work up he had a RHC (6/18/20 ) which showed RA 20/26/18, RV 85/28, PA 82/45/58, PCWP 45 and an angiogram (6/18/20) which showed a patent LIMA- LAD with collateralization of LAD to PDA and occluded SVG- OM1.      His cardiopulmonary exercise testing from last year showed a VO2 of 10.2.  His VE/VCO2 slope was high normal.  His systolic blood pressure dropped during exercise.  Subsequent right heart catheterizations have shown moderately reduced cardiac index with elevated left-sided filling pressure.  His renal function has been elevated for many years, and this has been gradually, slowly getting worse.     Currently he presents with 3 days of worsening fatigue and SOB with minimal exertion. Patient claims he could walk 1-2 blocks last week but has been barely able to ambulate home recently.       PHYSICAL EXAM:  Blood pressure 109/77, temperature 98.6  F (37  C), temperature source Oral, resp. rate 20, height 1.626 m (5' 4\"), weight 81.9 kg (180 lb 8 oz), SpO2 99 %.  GENERAL: Appears pale and weak.  NECK: Supple and without lymphadenopathy. JVP to the angle of mandible   CV: S1/S2 heard without murmur   RESPIRATORY: B/l crackles noted  GI: Soft and distended. No tenderness, rebound, guarding. No palpable organomegaly.   EXTREMITIES: Peripheral edema 3+. 2+ bilateral pedal pulses.   NEUROLOGIC: Alert and orientated x 3.   MUSCULOSKELETAL: No " joint swelling or tenderness.   SKIN: No jaundice. No acute rashes or lesions.      ROS:   12-pt ROS normal other than noted in history and physical.      PMH:  Past Medical History[]Expand by Default        Past Medical History:   Diagnosis Date     CAD (coronary artery disease)       CHF (congestive heart failure) (H)       CKD (chronic kidney disease), stage III (H)       Cortical cataract of both eyes       Diabetes (H)       Hyperlipidemia       Hypertension       Ischemic cardiomyopathy       Obesity       CHANTEL (obstructive sleep apnea)       occas cpap     Osteoarthritis             PSH:  Past Surgical History[]Expand by Default         Past Surgical History:   Procedure Laterality Date     C CABG, ARTERY-VEIN, THREE   02/2008     CATARACT IOL, RT/LT         COLONOSCOPY N/A 8/7/2019     Procedure: COLONOSCOPY, WITH POLYPECTOMY AND BIOPSY;  Surgeon: Chauncey Morataya MD;  Location:  GI     CV RIGHT HEART CATH N/A 3/25/2019     Procedure: CV RIGHT HEART CATH;  Surgeon: Moises Santos MD;  Location:  HEART CARDIAC CATH LAB     CV RIGHT HEART CATH N/A 7/10/2019     Procedure: CV RIGHT HEART CATH;  Surgeon: aJk Mccabe MD;  Location:  HEART CARDIAC CATH LAB     IMPLANT AUTOMATIC IMPLANTABLE CARDIOVERTER DEFIBRILLATOR         PHACOEMULSIFICATION CLEAR CORNEA WITH STANDARD IOL, VITRECTOMY PARSPLANA 25 GAGUE, COMBINED Left 12/10/2019     Procedure: PHACOEMULSIFICATION, CATARACT, CLEAR CORNEAL INCISION APPROACH, W STD INTRAOCULAR LENS IMPLANT INSERT + VITRECTOMY BY PARS PLANA  USING 25-GAUGE INSTRUMENTS. ENDOLASER, INFUSION OF 20% SF6 GAS;  Surgeon: Triny Bunch MD;  [facility]: [facility]           MEDICATIONS:  Prior to Admission Medications   Prescriptions Last Dose Informant Patient Reported? Taking?   aspirin 81 MG tablet   Self No No   Sig: Take 1 tablet (81 mg) by mouth daily   atorvastatin (LIPITOR) 40 MG tablet   Self No No   Sig: Take 1 tablet (40 mg) by mouth daily   blood glucose  (ACCU-CHEK SMARTVIEW) test strip   Self No No   Sig: USE TO TEST THREE TIMES DAILY AS DIRECTED   blood glucose (NO BRAND SPECIFIED) test strip   Self No No   Sig: Use to test blood sugar 2 times daily or as directed.   blood glucose monitoring (ACCU-CHEK FELIPE SMARTVIEW) meter device kit   Self No No   Sig: Use to test blood sugar 3-4 times daily, as directed.   blood glucose monitoring (ONE TOUCH DELICA) lancets   Self No No   Sig: Use to test blood sugars 2 times daily.   clopidogrel (PLAVIX) 75 MG tablet   Self No No   Sig: Take 1 tablet (75 mg) by mouth daily   exenatide ER (BYDUREON) 2 MG pen   Self No No   Sig: Inject 2 mg Subcutaneous every 7 days   furosemide (LASIX) 20 MG tablet   Self No No   Sig: Take 2 tablets (40 mg) by mouth 2 times daily   glimepiride (AMARYL) 4 MG tablet   Self No No   Sig: Take 1 tablet (4 mg) by mouth every morning (before breakfast)   hydrALAZINE (APRESOLINE) 25 MG tablet   Self No No   Sig: Take 1 tablet (25 mg) by mouth 3 times daily   insulin glargine (LANTUS SOLOSTAR) 100 UNIT/ML pen   Self No No   Sig: Take 8 units in the morning and 40 units in the evening   insulin pen needle (B-D U/F) 31G X 5 MM miscellaneous   Self No No   Si Units by Device route 2 times daily Use 2 pen needles daily or as directed.   isosorbide mononitrate (IMDUR) 60 MG 24 hr tablet   Self No No   Sig: Take 1 tablet (60 mg) by mouth daily   losartan (COZAAR) 50 MG tablet   Spouse/Significant Other No No   Sig: Take 1 tablet (50 mg) by mouth daily   nitroglycerin (NITROSTAT) 0.4 MG SL tablet   Self No No   Sig: Place 1 tablet (0.4 mg) under the tongue every 5 minutes as needed for chest pain If you are still having symptoms after 3 doses (15 minutes) call 911.   order for DME   Self No No   Sig: Auto CPAP 8-15 cmH20      Facility-Administered Medications Last Administration Doses Remaining   erythromycin (ROMYCIN) ophthalmic ointment None recorded     lidocaine (PF) (XYLOCAINE) 2 % injection 10 mL  None recorded 1            ALLERGIES:          Allergies   Allergen Reactions     No Known Drug Allergies          FAMILY HISTORY:  Family History         Family History   Problem Relation Age of Onset     Diabetes Brother       Diabetes Sister       Diabetes Sister       Macular Degeneration No family hx of       Glaucoma No family hx of       Myocardial Infarction No family hx of       Kidney Disease No family hx of             SOCIAL HISTORY:  Social History   Social History            Socioeconomic History     Marital status:        Spouse name: Not on file     Number of children: 4     Years of education: Not on file     Highest education level: Not on file   Occupational History     Occupation:        Employer: [employer]       Employer: RETIRED   Social Needs     Financial resource strain: Not on file     Food insecurity       Worry: Not on file       Inability: Not on file     Transportation needs       Medical: Not on file       Non-medical: Not on file   Tobacco Use     Smoking status: Never Smoker     Smokeless tobacco: Never Used     Tobacco comment: Never smoked; non-smoking household   Substance and Sexual Activity     Alcohol use: No       Alcohol/week: 0.0 standard drinks     Drug use: No     Sexual activity: Yes       Partners: Female   Lifestyle     Physical activity       Days per week: Not on file       Minutes per session: Not on file     Stress: Not on file   Relationships     Social connections       Talks on phone: Not on file       Gets together: Not on file       Attends Christianity service: Not on file       Active member of club or organization: Not on file       Attends meetings of clubs or organizations: Not on file       Relationship status: Not on file     Intimate partner violence       Fear of current or ex partner: Not on file       Emotionally abused: Not on file       Physically abused: Not on file       Forced sexual activity: Not on file   Other Topics  Concern     Parent/sibling w/ CABG, MI or angioplasty before 65F 55M? No   Social History Narrative     Not on file           LABS:  BMP  Recent Labs   Lab 07/02/20  0813      POTASSIUM 3.5   CHLORIDE 102   CO2 31   BUN 30   CR 1.72*   GLC 94   BRYANNA 8.6     CBC      Recent Labs   Lab 07/02/20  0813   WBC 6.4   HGB 12.6*   HCT 39.6*   MCV 96        INR      Recent Labs   Lab 07/02/20  0813   INR 1.09   PTT 29     IMAGING:     RHC 6/18/20      RA 20/26/18  RV 85/28  PA 82/45/58  PCWP 45  Cardiac output by Jacy: 3.85 L/min (indexed to 2.09 L/min/m2)  Cardiac output by thermodilution: 3.63 L/min (indexed to 1.97 L/min/m2)  SVR (by TD CO): 1123  PVR (by TD CO): 7.4     Angiogram 6/18/20   3 vessel CAD s/p 3V CABG in 2008 (LIMA-LAD, SVG-OM1, SVG-RPDA)  2. Known proximal SVG-RPDA occlusion  3. Presumed occlusion of SVG-OM1 (unable to locate on non-selective aortic root shot)  4. Patent LIMA-LAD with collateralization of LAD to PDA     Echocardiogram ( 3/11/2019)   Moderate to severe left ventricular dilation is present.  Severely (EF 10-20%) reduced left ventricular function is present.  No significant valve dysfunction.  Compared to study done 6/5/17 there is no significant change in LV size and  systolic function     ASSESSMENT & PLAN:  Lucian VILA Tee Henderson Sr. is a 64 year old male with a PMH of HTN, DMII, obesity, CKD, CHANTEL, ischemic cardiomyopathy and severe LV systolic dysfunction s/p 3V CABG (2008) and primary prevention ICD ( 4/2/15).   I discussed options of both heart transplantation and LVAD placement with patient and family including risks of death, bleeding, stroke, infection, renal failure, RV failure and arrhythmias. He understands and is willing to consider this. Will discuss plan with Heart Failure team.

## 2020-07-07 NOTE — PROGRESS NOTES
[facility]   Cardiology II Service / Advanced Heart Failure  Progress note    Lucian Morillo  : 1955  MRN # [id number]    ADMIT DATE: 7/3/2020    Changes today  - Lasix drip stopped  - 250 ml LR bolus administered.   - IV Amiodarone bolus 150 ,  Followed by 1mg/min infusion for 6h followed by 0.5mg/min. for Aflutter.   - Digoxin  mcg   - Antihypertensives held until ADARSH.   - EP consulted and ADARSH planned pending covid test.    - Cardioversion (planned for 20)  - Covid test ordered for ADARSH  - Patient transferred to ICU    ASSESSMENT & PLAN:  Lucian Henderson Sr. is a 64 year old male with a PMH of HTN, DMII, obesity, CKD, CHANTEL, ischemic cardiomyopathy and severe LV systolic dysfunction s/p 3V CABG () and primary prevention ICD ( 4/2/15). Currently he presents with 3 days of worsening fatigue and SOB with minimal exertion. Patient claims he could walk 1-2 blocks last week but has been barely able to ambulate home recently.     Admitted for possible LVAD/Transplant work up.    #Atrial Flutter with RVR  Patient went into Atrial flutter with RVR with rates around 150 overnight on 2020 at around 2 am. 5 mg IV beta-blocker was administered but this significantly lowered the patient's blood pressure.  - 5 mg IV beta-blocker administered and heparin started on 20 over night when patient went into flutter.    - Stopped Lasix   - 250 ml LR Bolus  - IV Amiodarone bolus 150 , followed by 1mg/min infusion for 6h followed by 0.5mg/min.  - Digoxin  mcg   - ADARSH ordered for 20, covid test ordered for ADARSH on 20   - Plan for DCCV on 20  - Antihypertensives held until cardioversion, planned for 20  - Patient transferred to the ICU      # ICM s/p 3V CABG () and primary prevention ICD ( 4/2/15)  #HFrEF Stage D, NYHA III-IV  # Ambulatory cardiogenic shock  # Arrhythmia: HF associated VT/VF  Currently he presents with 3 days of worsening fatigue  and SOB with minimal exertion, cardiac cachexia and cold extremities. Admitted for possible LVAD/Transplant work up. - Echocardiogram showed an EF of 15% on 7/5/20  - Diuresis : Lasix drip of 15 mg/hr   - ASA 81 mg daily  - Atorvastatin 40 daily  - Hydralazine 25 TID (Held until after cardioversion)  - IMDUR 60 mg (Held until after cardioversion)  - Losartan 50 mg  (Held until after cardioversion)   -Started LVAD/ Transplant work up with consults for Neuro/Psyc, CVTS, social, dental, palliative   - LVAD show and tell to be done during admission, LVAD coordinator on call paged  - He has had a colonoscopy on 8/7/19   - Will reassess and document PVR once clinically euvolemic. He had PVR ~7.4 four days earlier while fluid overloaded. Plan for RHC on Wednesday (ordered).  - Will reassess and document PVR once clinically euvolemic. He had PVR ~7.4 four days earlier while fluid overloaded. Plan for RHC on Wednesday (ordered). Anticipate discharge by the end of this week.    # DMII ( A1c: 8.3)   - Finger sticks  - Hypoglycemia protocol   - Moderate intensity sliding scale insulin       #CKD  - Monitor Scr and K Q12h   - Avoid nephrotoxic agents    Floor Care  Fluids: 1.5L fluid restriction  Food: 2 gram sodium diet, low fat diet  Electrolyte repletion: potassium/magnesium sliding scale  DVT ppx: Lovenox  Glycemic Control: sliding scale insulin   Lines: PIV x1  Consults: none  Code Status: full    Discharge plan: inpatient admission, anticipate discharge TBD    Patient was discussed with attending physician, Dr. Arvin Sheridan MD.    Michael Dillard MD  Internal Medicine Resident (PGY1)  [id number]    ..........................................................................................................................................................  Subjective:  Patient is currently in hemodynamically stable A flutter with RVR.  Feels light headed. No CP, SOB, nausea, vomiting.     PHYSICAL EXAM:  /64   Pulse  "147   Temp 98.5  F (36.9  C) (Oral)   Resp 18   Ht 1.626 m (5' 4\")   Wt 78.1 kg (172 lb 1.6 oz)   SpO2 99%   BMI 29.54 kg/m    GENERAL: NAD  NECK: Supple and without lymphadenopathy. JVP 6cm  CV: S1/S2 heard without murmur   RESPIRATORY: CTAB  GI: Soft and distended. No tenderness, rebound, guarding. No palpable organomegaly.   EXTREMITIES: Peripheral edema trace.   NEUROLOGIC: Alert and orientated x 3.   MUSCULOSKELETAL: No joint swelling or tenderness.   SKIN: No jaundice. No acute rashes or lesions.     ROS:   12-pt ROS otherwise negative except as noted above    PMH:  Past Medical History:   Diagnosis Date     CAD (coronary artery disease)      CHF (congestive heart failure) (H)      CKD (chronic kidney disease), stage III (H)      Cortical cataract of both eyes      Diabetes (H)      Hyperlipidemia      Hypertension      Ischemic cardiomyopathy      Obesity      CHANTEL (obstructive sleep apnea)     occas cpap     Osteoarthritis        PSH:  Past Surgical History:   Procedure Laterality Date     C CABG, ARTERY-VEIN, THREE  02/2008     CATARACT IOL, RT/LT       COLONOSCOPY N/A 8/7/2019    Procedure: COLONOSCOPY, WITH POLYPECTOMY AND BIOPSY;  Surgeon: Chauncey Morataya MD;  Location:  GI     CV RIGHT HEART CATH N/A 3/25/2019    Procedure: CV RIGHT HEART CATH;  Surgeon: Moises Santos MD;  Location:  HEART CARDIAC CATH LAB     CV RIGHT HEART CATH N/A 7/10/2019    Procedure: CV RIGHT HEART CATH;  Surgeon: Jak Mccabe MD;  Location:  HEART CARDIAC CATH LAB     IMPLANT AUTOMATIC IMPLANTABLE CARDIOVERTER DEFIBRILLATOR       PHACOEMULSIFICATION CLEAR CORNEA WITH STANDARD IOL, VITRECTOMY PARSPLANA 25 GAGUE, COMBINED Left 12/10/2019    Procedure: PHACOEMULSIFICATION, CATARACT, CLEAR CORNEAL INCISION APPROACH, W STD INTRAOCULAR LENS IMPLANT INSERT + VITRECTOMY BY PARS PLANA  USING 25-GAUGE INSTRUMENTS. ENDOLASER, INFUSION OF 20% SF6 GAS;  Surgeon: Triny Bunch MD;  Location:  OR "       MEDICATIONS:  Prior to Admission Medications   Prescriptions Last Dose Informant Patient Reported? Taking?   aspirin 81 MG tablet  Self No No   Sig: Take 1 tablet (81 mg) by mouth daily   atorvastatin (LIPITOR) 40 MG tablet  Self No No   Sig: Take 1 tablet (40 mg) by mouth daily   blood glucose (ACCU-CHEK SMARTVIEW) test strip  Self No No   Sig: USE TO TEST THREE TIMES DAILY AS DIRECTED   blood glucose (NO BRAND SPECIFIED) test strip  Self No No   Sig: Use to test blood sugar 2 times daily or as directed.   blood glucose monitoring (ACCU-CHEK FELIPE SMARTVIEW) meter device kit  Self No No   Sig: Use to test blood sugar 3-4 times daily, as directed.   blood glucose monitoring (ONE TOUCH DELICA) lancets  Self No No   Sig: Use to test blood sugars 2 times daily.   clopidogrel (PLAVIX) 75 MG tablet  Self No No   Sig: Take 1 tablet (75 mg) by mouth daily   exenatide ER (BYDUREON) 2 MG pen  Self No No   Sig: Inject 2 mg Subcutaneous every 7 days   furosemide (LASIX) 20 MG tablet  Self No No   Sig: Take 2 tablets (40 mg) by mouth 2 times daily   glimepiride (AMARYL) 4 MG tablet  Self No No   Sig: Take 1 tablet (4 mg) by mouth every morning (before breakfast)   hydrALAZINE (APRESOLINE) 25 MG tablet  Self No No   Sig: Take 1 tablet (25 mg) by mouth 3 times daily   insulin glargine (LANTUS SOLOSTAR) 100 UNIT/ML pen  Self No No   Sig: Take 8 units in the morning and 40 units in the evening   insulin pen needle (B-D U/F) 31G X 5 MM miscellaneous  Self No No   Si Units by Device route 2 times daily Use 2 pen needles daily or as directed.   isosorbide mononitrate (IMDUR) 60 MG 24 hr tablet  Self No No   Sig: Take 1 tablet (60 mg) by mouth daily   losartan (COZAAR) 50 MG tablet  Spouse/Significant Other No No   Sig: Take 1 tablet (50 mg) by mouth daily   nitroglycerin (NITROSTAT) 0.4 MG SL tablet  Self No No   Sig: Place 1 tablet (0.4 mg) under the tongue every 5 minutes as needed for chest pain If you are still having  symptoms after 3 doses (15 minutes) call 911.   order for DME  Self No No   Sig: Auto CPAP 8-15 cmH20      Facility-Administered Medications Last Administration Doses Remaining   erythromycin (ROMYCIN) ophthalmic ointment None recorded    lidocaine (PF) (XYLOCAINE) 2 % injection 10 mL None recorded 1           ALLERGIES:     Allergies   Allergen Reactions     No Known Drug Allergies        FAMILY HISTORY:  Family History   Problem Relation Age of Onset     Diabetes Brother      Diabetes Sister      Diabetes Sister      Macular Degeneration No family hx of      Glaucoma No family hx of      Myocardial Infarction No family hx of      Kidney Disease No family hx of        SOCIAL HISTORY:  Social History     Socioeconomic History     Marital status:      Spouse name: Not on file     Number of children: 4     Years of education: Not on file     Highest education level: Not on file   Occupational History     Occupation:      Employer: [employer]     Employer: RETIRED   Social Needs     Financial resource strain: Not on file     Food insecurity     Worry: Not on file     Inability: Not on file     Transportation needs     Medical: Not on file     Non-medical: Not on file   Tobacco Use     Smoking status: Never Smoker     Smokeless tobacco: Never Used     Tobacco comment: Never smoked; non-smoking household   Substance and Sexual Activity     Alcohol use: No     Alcohol/week: 0.0 standard drinks     Drug use: No     Sexual activity: Yes     Partners: Female   Lifestyle     Physical activity     Days per week: Not on file     Minutes per session: Not on file     Stress: Not on file   Relationships     Social connections     Talks on phone: Not on file     Gets together: Not on file     Attends Moravian service: Not on file     Active member of club or organization: Not on file     Attends meetings of clubs or organizations: Not on file     Relationship status: Not on file     Intimate partner  violence     Fear of current or ex partner: Not on file     Emotionally abused: Not on file     Physically abused: Not on file     Forced sexual activity: Not on file   Other Topics Concern     Parent/sibling w/ CABG, MI or angioplasty before 65F 55M? No   Social History Narrative     Not on file       LABS:  BMP  Recent Labs   Lab 07/07/20  0447 07/06/20  1644 07/06/20  0615 07/06/20  0506 07/05/20  1743    134 138 Unsatisfactory specimen - possible contamination 136   POTASSIUM 4.0  4.1 4.2 3.7 Unsatisfactory specimen - possible contamination 3.7   CHLORIDE 102 102  --  98 102   CO2 24 24  --  21 26   BUN 37* 38*  --  36* 37*   CR 1.82* 1.76*  --  1.74* 1.79*   * 265*  --  140* 220*   BRYANNA 9.0 8.8 8.8 Unsatisfactory specimen - possible contamination 8.5     CBC  Recent Labs   Lab 07/07/20  0447 07/07/20  0133 07/06/20  0506 07/05/20  0708   WBC 6.5 5.9 5.8 5.5   HGB 13.0* 12.9* 12.8* 12.6*   HCT 42.0 40.7 41.3 39.6*   MCV 97 96 99 97    246 239 238     INR  Recent Labs   Lab 07/03/20  1559 07/02/20  0813   INR 1.12 1.09   PTT  --  29     IMAGING:    RHC 6/18/20     RA 20/26/18  RV 85/28  PA 82/45/58  PCWP 45  Cardiac output by Jacy: 3.85 L/min (indexed to 2.09 L/min/m2)  Cardiac output by thermodilution: 3.63 L/min (indexed to 1.97 L/min/m2)  SVR (by TD CO): 1123  PVR (by TD CO): 7.4    Angiogram 6/18/20   3 vessel CAD s/p 3V CABG in 2008 (LIMA-LAD, SVG-OM1, SVG-RPDA)  2. Known proximal SVG-RPDA occlusion  3. Presumed occlusion of SVG-OM1 (unable to locate on non-selective aortic root shot)  4. Patent LIMA-LAD with collateralization of LAD to PDA    Echocardiogram ( 3/11/2019)   Moderate to severe left ventricular dilation is present.  Severely (EF 10-20%) reduced left ventricular function is present.  No significant valve dysfunction.  Compared to study done 6/5/17 there is no significant change in LV size and  systolic function    Echocardiogram ( 7/5/2020)   Interpretation Summary  Severely  (EF 10-20%) reduced left ventricular function is present. LVEF 15%  based on biplane 2D tracing. Severe left ventricular dilation is present.  LVIDd:6.8 cm. Grade III or advanced diastolic dysfunction.  The right ventricle is normal size.  Global right ventricular function is moderately reduced.  No significant valvular abnormalities were noted.  IVC diameter and respiratory changes fall into an intermediate range  suggesting an RA pressure of 8 mmHg.  Mild pulmonary hypertension is present.     This study was compared with the study from 3/25/2019. RV function has  worsened, LV size and function appear similar.    Devices  ICD ([employer]- 4/2/2015 )

## 2020-07-07 NOTE — PLAN OF CARE
Admitted/transferred from: 6C  Reason for admission/transfer: Cardioversion  Patient status upon admission/transfer: Atrial flutter with HR 140s, BP stable.  Interventions: Place PIV, prepared for bedside cardioversion.   Plan: Decided to obtain echocardiogram prior to cardioversion. NPO at midnight tonight. COVID test required prior to procedure, specimen obtained.   2 RN skin assessment: completed by Mari (float float)  Result of skin assessment and interventions/actions: Skin intact, no interventions required.  Height, weight, drug calc weight: Done  Patient belongings (see Flowsheet - Adult Profile for details): Patient belongings bag with clothing and shoes placed in closet.  MDRO education (if applicable):  N/a.          ICU End of Shift Summary. See flowsheets for vital signs and detailed assessment.    Changes this shift: Arrived from 6C alert and oriented. Heart rate continues to be in 140-150s, atrial flutter. Blood pressure stable upon arrival.     Plan: NPO at midnight. COVID test pending. Plan for echocardiogram and cardioversion when able.

## 2020-07-07 NOTE — PROGRESS NOTES
"Met with patient, wifeto present the Hm3/HW VAD(s) as possible treatment option. Wife was included in person.    Discussed patient and caregiver responsibilities before and after VAD implant. Clarified that, r/t COVID-19 visitor restrictions, only 2 caregivers may be trained. Clarified the need for a caregiver to be present for education and to assist patient for at least first 30 days after a patient returns home. Patient's designated caregiver is his Wife.     Discussed basic overview of VAD equipment, on going management, need for anticoagulation, regular dressing change, grounded three-pronged outlet and functioning telephone. Also discussed frequency of follow-up clinic appointments and the need to stay locally for at least 2-4 weeks.  Reviewed restrictions of having a VAD such as no swimming, bathing, boats, MRI's, or arc welding.     Provided and discussed the VAD educational brochures, information regarding the VAD and transplant support groups, information on INTERMACs registry, and \"VAD What You Should Know\" with additional details. Patient and wife signed \"VAD What You Should Know\" document (or agreed to have VAD Coordinator sign on their behalf). VAD coordinator contact information provided.  Encouraged patient, wife to call with questions.     "

## 2020-07-08 ENCOUNTER — PREP FOR PROCEDURE (OUTPATIENT)
Dept: CARDIOLOGY | Facility: CLINIC | Age: 65
End: 2020-07-08

## 2020-07-08 ENCOUNTER — APPOINTMENT (OUTPATIENT)
Dept: GENERAL RADIOLOGY | Facility: CLINIC | Age: 65
DRG: 001 | End: 2020-07-08
Attending: INTERNAL MEDICINE
Payer: COMMERCIAL

## 2020-07-08 DIAGNOSIS — I50.9 HEART FAILURE (H): Primary | ICD-10-CM

## 2020-07-08 LAB
ALBUMIN SERPL-MCNC: 3 G/DL (ref 3.4–5)
ALP SERPL-CCNC: 117 U/L (ref 40–150)
ALT SERPL W P-5'-P-CCNC: 24 U/L (ref 0–70)
ANION GAP SERPL CALCULATED.3IONS-SCNC: 7 MMOL/L (ref 3–14)
ANION GAP SERPL CALCULATED.3IONS-SCNC: 9 MMOL/L (ref 3–14)
AST SERPL W P-5'-P-CCNC: 26 U/L (ref 0–45)
BASE EXCESS BLDV CALC-SCNC: 0.7 MMOL/L
BASE EXCESS BLDV CALC-SCNC: 0.7 MMOL/L
BASE EXCESS BLDV CALC-SCNC: 1.3 MMOL/L
BILIRUB DIRECT SERPL-MCNC: 0.3 MG/DL (ref 0–0.2)
BILIRUB SERPL-MCNC: 1 MG/DL (ref 0.2–1.3)
BUN SERPL-MCNC: 43 MG/DL (ref 7–30)
BUN SERPL-MCNC: 46 MG/DL (ref 7–30)
CALCIUM SERPL-MCNC: 8.6 MG/DL (ref 8.5–10.1)
CALCIUM SERPL-MCNC: 8.9 MG/DL (ref 8.5–10.1)
CHLORIDE SERPL-SCNC: 103 MMOL/L (ref 94–109)
CHLORIDE SERPL-SCNC: 103 MMOL/L (ref 94–109)
CO2 SERPL-SCNC: 23 MMOL/L (ref 20–32)
CO2 SERPL-SCNC: 25 MMOL/L (ref 20–32)
CREAT SERPL-MCNC: 1.82 MG/DL (ref 0.66–1.25)
CREAT SERPL-MCNC: 1.89 MG/DL (ref 0.66–1.25)
ERYTHROCYTE [DISTWIDTH] IN BLOOD BY AUTOMATED COUNT: 15.8 % (ref 10–15)
GFR SERPL CREATININE-BSD FRML MDRD: 36 ML/MIN/{1.73_M2}
GFR SERPL CREATININE-BSD FRML MDRD: 38 ML/MIN/{1.73_M2}
GLUCOSE BLDC GLUCOMTR-MCNC: 177 MG/DL (ref 70–99)
GLUCOSE BLDC GLUCOMTR-MCNC: 192 MG/DL (ref 70–99)
GLUCOSE BLDC GLUCOMTR-MCNC: 208 MG/DL (ref 70–99)
GLUCOSE BLDC GLUCOMTR-MCNC: 252 MG/DL (ref 70–99)
GLUCOSE SERPL-MCNC: 184 MG/DL (ref 70–99)
GLUCOSE SERPL-MCNC: 231 MG/DL (ref 70–99)
HCO3 BLDV-SCNC: 26 MMOL/L (ref 21–28)
HCT VFR BLD AUTO: 40.5 % (ref 40–53)
HGB BLD-MCNC: 13.1 G/DL (ref 13.3–17.7)
INTERPRETATION ECG - MUSE: NORMAL
LMWH PPP CHRO-ACNC: 0.32 IU/ML
MAGNESIUM SERPL-MCNC: 2.3 MG/DL (ref 1.6–2.3)
MAGNESIUM SERPL-MCNC: 2.3 MG/DL (ref 1.6–2.3)
MCH RBC QN AUTO: 31 PG (ref 26.5–33)
MCHC RBC AUTO-ENTMCNC: 32.3 G/DL (ref 31.5–36.5)
MCV RBC AUTO: 96 FL (ref 78–100)
O2/TOTAL GAS SETTING VFR VENT: 21 %
OXYHGB MFR BLDV: 49 %
OXYHGB MFR BLDV: 52 %
OXYHGB MFR BLDV: 60 %
PCO2 BLDV: 41 MM HG (ref 40–50)
PCO2 BLDV: 42 MM HG (ref 40–50)
PCO2 BLDV: 43 MM HG (ref 40–50)
PH BLDV: 7.39 PH (ref 7.32–7.43)
PH BLDV: 7.4 PH (ref 7.32–7.43)
PH BLDV: 7.4 PH (ref 7.32–7.43)
PLATELET # BLD AUTO: 257 10E9/L (ref 150–450)
PO2 BLDV: 30 MM HG (ref 25–47)
PO2 BLDV: 30 MM HG (ref 25–47)
PO2 BLDV: 34 MM HG (ref 25–47)
POTASSIUM SERPL-SCNC: 4.2 MMOL/L (ref 3.4–5.3)
POTASSIUM SERPL-SCNC: 4.3 MMOL/L (ref 3.4–5.3)
PROT SERPL-MCNC: 7.1 G/DL (ref 6.8–8.8)
RBC # BLD AUTO: 4.23 10E12/L (ref 4.4–5.9)
SODIUM SERPL-SCNC: 134 MMOL/L (ref 133–144)
SODIUM SERPL-SCNC: 135 MMOL/L (ref 133–144)
WBC # BLD AUTO: 6 10E9/L (ref 4–11)

## 2020-07-08 PROCEDURE — 85520 HEPARIN ASSAY: CPT | Performed by: STUDENT IN AN ORGANIZED HEALTH CARE EDUCATION/TRAINING PROGRAM

## 2020-07-08 PROCEDURE — 40000196 ZZH STATISTIC RAPCV CVP MONITORING

## 2020-07-08 PROCEDURE — 25000128 H RX IP 250 OP 636

## 2020-07-08 PROCEDURE — 85027 COMPLETE CBC AUTOMATED: CPT | Performed by: STUDENT IN AN ORGANIZED HEALTH CARE EDUCATION/TRAINING PROGRAM

## 2020-07-08 PROCEDURE — 25000132 ZZH RX MED GY IP 250 OP 250 PS 637: Performed by: STUDENT IN AN ORGANIZED HEALTH CARE EDUCATION/TRAINING PROGRAM

## 2020-07-08 PROCEDURE — C1894 INTRO/SHEATH, NON-LASER: HCPCS | Performed by: INTERNAL MEDICINE

## 2020-07-08 PROCEDURE — 40000275 ZZH STATISTIC RCP TIME EA 10 MIN

## 2020-07-08 PROCEDURE — 40000986 XR CHEST PORT 1 VW

## 2020-07-08 PROCEDURE — 00000146 ZZHCL STATISTIC GLUCOSE BY METER IP

## 2020-07-08 PROCEDURE — 93451 RIGHT HEART CATH: CPT | Performed by: INTERNAL MEDICINE

## 2020-07-08 PROCEDURE — 25000132 ZZH RX MED GY IP 250 OP 250 PS 637: Performed by: SURGERY

## 2020-07-08 PROCEDURE — 80076 HEPATIC FUNCTION PANEL: CPT | Performed by: STUDENT IN AN ORGANIZED HEALTH CARE EDUCATION/TRAINING PROGRAM

## 2020-07-08 PROCEDURE — 82805 BLOOD GASES W/O2 SATURATION: CPT | Performed by: INTERNAL MEDICINE

## 2020-07-08 PROCEDURE — 83735 ASSAY OF MAGNESIUM: CPT | Performed by: INTERNAL MEDICINE

## 2020-07-08 PROCEDURE — 25000128 H RX IP 250 OP 636: Performed by: STUDENT IN AN ORGANIZED HEALTH CARE EDUCATION/TRAINING PROGRAM

## 2020-07-08 PROCEDURE — 25000132 ZZH RX MED GY IP 250 OP 250 PS 637: Performed by: INTERNAL MEDICINE

## 2020-07-08 PROCEDURE — 83735 ASSAY OF MAGNESIUM: CPT | Performed by: STUDENT IN AN ORGANIZED HEALTH CARE EDUCATION/TRAINING PROGRAM

## 2020-07-08 PROCEDURE — 25000128 H RX IP 250 OP 636: Performed by: INTERNAL MEDICINE

## 2020-07-08 PROCEDURE — 25000125 ZZHC RX 250: Performed by: INTERNAL MEDICINE

## 2020-07-08 PROCEDURE — 20000004 ZZH R&B ICU UMMC

## 2020-07-08 PROCEDURE — 4A1239Z MONITORING OF CARDIAC OUTPUT, PERCUTANEOUS APPROACH: ICD-10-PCS | Performed by: INTERNAL MEDICINE

## 2020-07-08 PROCEDURE — 25800030 ZZH RX IP 258 OP 636: Performed by: STUDENT IN AN ORGANIZED HEALTH CARE EDUCATION/TRAINING PROGRAM

## 2020-07-08 PROCEDURE — 36415 COLL VENOUS BLD VENIPUNCTURE: CPT | Performed by: STUDENT IN AN ORGANIZED HEALTH CARE EDUCATION/TRAINING PROGRAM

## 2020-07-08 PROCEDURE — 4A133B3 MONITORING OF ARTERIAL PRESSURE, PULMONARY, PERCUTANEOUS APPROACH: ICD-10-PCS | Performed by: INTERNAL MEDICINE

## 2020-07-08 PROCEDURE — 93451 RIGHT HEART CATH: CPT | Mod: 26 | Performed by: INTERNAL MEDICINE

## 2020-07-08 PROCEDURE — 40000048 ZZH STATISTIC DAILY SWAN MONITORING

## 2020-07-08 PROCEDURE — 25000125 ZZHC RX 250: Performed by: STUDENT IN AN ORGANIZED HEALTH CARE EDUCATION/TRAINING PROGRAM

## 2020-07-08 PROCEDURE — 25800030 ZZH RX IP 258 OP 636: Performed by: INTERNAL MEDICINE

## 2020-07-08 PROCEDURE — 25000131 ZZH RX MED GY IP 250 OP 636 PS 637: Performed by: STUDENT IN AN ORGANIZED HEALTH CARE EDUCATION/TRAINING PROGRAM

## 2020-07-08 PROCEDURE — 80048 BASIC METABOLIC PNL TOTAL CA: CPT | Performed by: STUDENT IN AN ORGANIZED HEALTH CARE EDUCATION/TRAINING PROGRAM

## 2020-07-08 PROCEDURE — 02HQ32Z INSERTION OF MONITORING DEVICE INTO RIGHT PULMONARY ARTERY, PERCUTANEOUS APPROACH: ICD-10-PCS | Performed by: INTERNAL MEDICINE

## 2020-07-08 PROCEDURE — 99233 SBSQ HOSP IP/OBS HIGH 50: CPT | Mod: 25 | Performed by: INTERNAL MEDICINE

## 2020-07-08 PROCEDURE — 93010 ELECTROCARDIOGRAM REPORT: CPT | Performed by: INTERNAL MEDICINE

## 2020-07-08 PROCEDURE — 80048 BASIC METABOLIC PNL TOTAL CA: CPT | Performed by: INTERNAL MEDICINE

## 2020-07-08 PROCEDURE — 27210794 ZZH OR GENERAL SUPPLY STERILE: Performed by: INTERNAL MEDICINE

## 2020-07-08 RX ADMIN — Medication 37.5 MG: at 19:49

## 2020-07-08 RX ADMIN — ACETAMINOPHEN 650 MG: 325 TABLET, FILM COATED ORAL at 12:49

## 2020-07-08 RX ADMIN — ACETAMINOPHEN 650 MG: 325 TABLET, FILM COATED ORAL at 19:52

## 2020-07-08 RX ADMIN — HEPARIN SODIUM 950 UNITS/HR: 10000 INJECTION, SOLUTION INTRAVENOUS at 04:20

## 2020-07-08 RX ADMIN — DIGOXIN 250 MCG: 0.25 INJECTION INTRAMUSCULAR; INTRAVENOUS at 00:30

## 2020-07-08 RX ADMIN — SODIUM THIOSULFATE 0.25 MCG/KG/MIN: 250 INJECTION, SOLUTION INTRAVENOUS at 16:29

## 2020-07-08 RX ADMIN — ATORVASTATIN CALCIUM 40 MG: 40 TABLET, FILM COATED ORAL at 07:50

## 2020-07-08 RX ADMIN — AMIODARONE HYDROCHLORIDE 0.5 MG/MIN: 50 INJECTION, SOLUTION INTRAVENOUS at 00:29

## 2020-07-08 RX ADMIN — ASPIRIN 81 MG CHEWABLE TABLET 81 MG: 81 TABLET CHEWABLE at 07:50

## 2020-07-08 RX ADMIN — AMIODARONE HYDROCHLORIDE 0.5 MG/MIN: 50 INJECTION, SOLUTION INTRAVENOUS at 08:36

## 2020-07-08 RX ADMIN — AMIODARONE HYDROCHLORIDE 0.5 MG/MIN: 50 INJECTION, SOLUTION INTRAVENOUS at 23:45

## 2020-07-08 RX ADMIN — AMIODARONE HYDROCHLORIDE 0.5 MG/MIN: 50 INJECTION, SOLUTION INTRAVENOUS at 16:39

## 2020-07-08 RX ADMIN — INSULIN GLARGINE 20 UNITS: 100 INJECTION, SOLUTION SUBCUTANEOUS at 22:02

## 2020-07-08 ASSESSMENT — ACTIVITIES OF DAILY LIVING (ADL)
ADLS_ACUITY_SCORE: 12

## 2020-07-08 ASSESSMENT — MIFFLIN-ST. JEOR: SCORE: 1455

## 2020-07-08 ASSESSMENT — PAIN DESCRIPTION - DESCRIPTORS: DESCRIPTORS: ACHING

## 2020-07-08 NOTE — PLAN OF CARE
Problem: Cardiac Output Decreased (Heart Failure)  Goal: Optimal Cardiac Output  7/8/2020 0617 by Anna King RN  Outcome: No Change  7/7/2020 1647 by Jocelyne Chery, RN  Outcome: Declining     ICU End of Shift Summary. See flowsheets for vital signs and detailed assessment.    Changes this shift: Pt alert and oriented. No pain. Pt had 7 beats of VT at 1944 last night - MD notified. Blood pressures stable throughout shift. Pt converted to sinus rhythm around 0245 - MD notified and EKG ordered. Pt has been NPO since midnight, per orders. No other changes overnight.     Plan: Continue to monitor heart rhythm/blood pressures and notify team of any changes.

## 2020-07-08 NOTE — PROGRESS NOTES
[facility]   Cardiology II Service / Advanced Heart Failure  Progress note    Lucian Morillo  : 1955  MRN # [id number]    ADMIT DATE: 7/3/2020    Changes today  - RHC today (2020)   RA : 8, PA: 74/39/50, PCWP: 38, CI: 1.28, CO: 2.3  PVR: 5.3, SVR: 2600  - Started IVnipride MAP and PO hydralazine goal < 70.   - Restart imdur and cozzar on 20.  - Plan to transition from IV Amiodarone to PO on 20    ASSESSMENT & PLAN:  Lucian Henderson Sr. is a 64 year old male with a PMH of HTN, DMII, obesity, CKD, CHANTEL, ischemic cardiomyopathy and severe LV systolic dysfunction s/p 3V CABG () and primary prevention ICD ( 4/2/15). Currently he presents with 3 days of worsening fatigue and SOB with minimal exertion. Patient claims he could walk 1-2 blocks last week but has been barely able to ambulate home recently.     Admitted for possible LVAD/Transplant work up.    Date RA MPAP PCWP SV02 Jacy CO Jacy CI MAP SVR PVR Intervention   20 8 50 38 49 2.3 1.28 89 2400 5.2                     #Atrial Flutter with RVR  Patient went into Atrial flutter with RVR with rates around 150 overnight on 2020 at around 2 am. 5 mg IV beta-blocker was administered but this significantly lowered the patient's blood pressure.  - 5 mg IV beta-blocker administered and heparin started on 20 over night when patient went into flutter.    - Stopped Lasix, 250 ml LR Bolus (20)  - IV Amiodarone bolus 150 , followed by 1mg/min infusion for 6h followed by 0.5mg/min.  - Digoxin  mcg (20)  - ADARSH cancled as patient converted to NSR overnight.    # ICM s/p 3V CABG () and primary prevention ICD ( 4/2/15)  #HFrEF Stage D, NYHA III-IV  # Ambulatory cardiogenic shock  # Arrhythmia: HF associated VT/VF  Currently he presents with 3 days of worsening fatigue and SOB with minimal exertion, cardiac cachexia and cold extremities. Admitted for possible LVAD/Transplant work up. -  "Echocardiogram showed an EF of 15% on 7/5/20  - Diuresis : Lasix drip of 15 mg/hr (held)  - ASA 81 mg daily  - Atorvastatin 40 daily  - Hydralazine 25 TID   - IMDUR 60 mg starting on 7/9/20  - Losartan 50 mg starting on 7/9/20  -Started LVAD/ Transplant work up with consults for Neuro/Psyc, CVTS, social, dental, palliative   - LVAD show and tell to be done during admission, LVAD coordinator on call paged  - He has had a colonoscopy on 8/7/19   - Hemodynamics as above     # DMII ( A1c: 8.3)   - Finger sticks  - Hypoglycemia protocol   - Moderate intensity sliding scale insulin       #CKD  - Monitor Scr and K Q12h   - Avoid nephrotoxic agents    Floor Care  Fluids: 1.5L fluid restriction  Food: 2 gram sodium diet, low fat diet  Electrolyte repletion: potassium/magnesium sliding scale  DVT ppx: Lovenox  Glycemic Control: sliding scale insulin   Lines: PIV x1  Consults: none  Code Status: full    Discharge plan: inpatient admission, anticipate discharge TBD    Patient was discussed with attending physician, Dr. Arvin Sheridan MD.    Michael Dillard MD  Internal Medicine Resident (PGY1)  [id number]    ..........................................................................................................................................................  Subjective:  Patient is currently in hemodynamically stable and self converted into NSR overnight. No CP, SOB, nausea, vomiting.     PHYSICAL EXAM:  /76   Pulse 92   Temp 98.5  F (36.9  C) (Oral)   Resp 16   Ht 1.626 m (5' 4\")   Wt 76.4 kg (168 lb 6.9 oz)   SpO2 95%   BMI 28.91 kg/m    GENERAL: NAD  NECK: Supple and without lymphadenopathy. JVP 6cm  CV: S1/S2 heard without murmur   RESPIRATORY: CTAB  GI: Soft and distended. No tenderness, rebound, guarding. No palpable organomegaly.   EXTREMITIES: Peripheral edema trace.   NEUROLOGIC: Alert and orientated x 3.   MUSCULOSKELETAL: No joint swelling or tenderness.   SKIN: No jaundice. No acute rashes or " lesions.     ROS:   12-pt ROS otherwise negative except as noted above    PMH:  Past Medical History:   Diagnosis Date     CAD (coronary artery disease)      CHF (congestive heart failure) (H)      CKD (chronic kidney disease), stage III (H)      Cortical cataract of both eyes      Diabetes (H)      Hyperlipidemia      Hypertension      Ischemic cardiomyopathy      Obesity      CHANTEL (obstructive sleep apnea)     occas cpap     Osteoarthritis        PSH:  Past Surgical History:   Procedure Laterality Date     C CABG, ARTERY-VEIN, THREE  02/2008     CATARACT IOL, RT/LT       COLONOSCOPY N/A 8/7/2019    Procedure: COLONOSCOPY, WITH POLYPECTOMY AND BIOPSY;  Surgeon: Chauncey Morataya MD;  Location:  GI     CV RIGHT HEART CATH N/A 3/25/2019    Procedure: CV RIGHT HEART CATH;  Surgeon: Moises Santos MD;  Location:  HEART CARDIAC CATH LAB     CV RIGHT HEART CATH N/A 7/10/2019    Procedure: CV RIGHT HEART CATH;  Surgeon: Jak Mccabe MD;  Location:  HEART CARDIAC CATH LAB     CV RIGHT HEART CATH N/A 7/8/2020    Procedure: Right Heart Cath with Leave In [place] already has ICU load;  Surgeon: Jak Mccabe MD;  Location:  HEART CARDIAC CATH LAB     IMPLANT AUTOMATIC IMPLANTABLE CARDIOVERTER DEFIBRILLATOR       PHACOEMULSIFICATION CLEAR CORNEA WITH STANDARD IOL, VITRECTOMY PARSPLANA 25 GAGUE, COMBINED Left 12/10/2019    Procedure: PHACOEMULSIFICATION, CATARACT, CLEAR CORNEAL INCISION APPROACH, W STD INTRAOCULAR LENS IMPLANT INSERT + VITRECTOMY BY PARS PLANA  USING 25-GAUGE INSTRUMENTS. ENDOLASER, INFUSION OF 20% SF6 GAS;  Surgeon: Triny Bunch MD;  Location:  OR       MEDICATIONS:  Prior to Admission Medications   Prescriptions Last Dose Informant Patient Reported? Taking?   aspirin 81 MG tablet  Self No No   Sig: Take 1 tablet (81 mg) by mouth daily   atorvastatin (LIPITOR) 40 MG tablet  Self No No   Sig: Take 1 tablet (40 mg) by mouth daily   blood glucose (ACCU-CHEK SMARTVIEW) test strip   Self No No   Sig: USE TO TEST THREE TIMES DAILY AS DIRECTED   blood glucose (NO BRAND SPECIFIED) test strip  Self No No   Sig: Use to test blood sugar 2 times daily or as directed.   blood glucose monitoring (ACCU-CHEK FELIPE SMARTVIEW) meter device kit  Self No No   Sig: Use to test blood sugar 3-4 times daily, as directed.   blood glucose monitoring (ONE TOUCH DELICA) lancets  Self No No   Sig: Use to test blood sugars 2 times daily.   clopidogrel (PLAVIX) 75 MG tablet  Self No No   Sig: Take 1 tablet (75 mg) by mouth daily   exenatide ER (BYDUREON) 2 MG pen  Self No No   Sig: Inject 2 mg Subcutaneous every 7 days   furosemide (LASIX) 20 MG tablet  Self No No   Sig: Take 2 tablets (40 mg) by mouth 2 times daily   glimepiride (AMARYL) 4 MG tablet  Self No No   Sig: Take 1 tablet (4 mg) by mouth every morning (before breakfast)   hydrALAZINE (APRESOLINE) 25 MG tablet  Self No No   Sig: Take 1 tablet (25 mg) by mouth 3 times daily   insulin glargine (LANTUS SOLOSTAR) 100 UNIT/ML pen  Self No No   Sig: Take 8 units in the morning and 40 units in the evening   insulin pen needle (B-D U/F) 31G X 5 MM miscellaneous  Self No No   Si Units by Device route 2 times daily Use 2 pen needles daily or as directed.   isosorbide mononitrate (IMDUR) 60 MG 24 hr tablet  Self No No   Sig: Take 1 tablet (60 mg) by mouth daily   losartan (COZAAR) 50 MG tablet  Spouse/Significant Other No No   Sig: Take 1 tablet (50 mg) by mouth daily   nitroglycerin (NITROSTAT) 0.4 MG SL tablet  Self No No   Sig: Place 1 tablet (0.4 mg) under the tongue every 5 minutes as needed for chest pain If you are still having symptoms after 3 doses (15 minutes) call 911.   order for DME  Self No No   Sig: Auto CPAP 8-15 cmH20      Facility-Administered Medications Last Administration Doses Remaining   erythromycin (ROMYCIN) ophthalmic ointment None recorded    lidocaine (PF) (XYLOCAINE) 2 % injection 10 mL None recorded 1           ALLERGIES:     Allergies    Allergen Reactions     No Known Drug Allergies        FAMILY HISTORY:  Family History   Problem Relation Age of Onset     Diabetes Brother      Diabetes Sister      Diabetes Sister      Macular Degeneration No family hx of      Glaucoma No family hx of      Myocardial Infarction No family hx of      Kidney Disease No family hx of        SOCIAL HISTORY:  Social History     Socioeconomic History     Marital status:      Spouse name: Not on file     Number of children: 4     Years of education: Not on file     Highest education level: Not on file   Occupational History     Occupation:      Employer: [employer]     Employer: RETIRED   Social Needs     Financial resource strain: Not on file     Food insecurity     Worry: Not on file     Inability: Not on file     Transportation needs     Medical: Not on file     Non-medical: Not on file   Tobacco Use     Smoking status: Never Smoker     Smokeless tobacco: Never Used     Tobacco comment: Never smoked; non-smoking household   Substance and Sexual Activity     Alcohol use: No     Alcohol/week: 0.0 standard drinks     Drug use: No     Sexual activity: Yes     Partners: Female   Lifestyle     Physical activity     Days per week: Not on file     Minutes per session: Not on file     Stress: Not on file   Relationships     Social connections     Talks on phone: Not on file     Gets together: Not on file     Attends Nondenominational service: Not on file     Active member of club or organization: Not on file     Attends meetings of clubs or organizations: Not on file     Relationship status: Not on file     Intimate partner violence     Fear of current or ex partner: Not on file     Emotionally abused: Not on file     Physically abused: Not on file     Forced sexual activity: Not on file   Other Topics Concern     Parent/sibling w/ CABG, MI or angioplasty before 65F 55M? No   Social History Narrative     Not on file       LABS:  BMP  Recent Labs   Lab  07/08/20  0501 07/07/20  1732 07/07/20  0447 07/06/20  1644    133 136 134   POTASSIUM 4.3 5.0 4.0  4.1 4.2   CHLORIDE 103 102 102 102   CO2 23 22 24 24   BUN 43* 45* 37* 38*   CR 1.89* 1.97* 1.82* 1.76*   * 259* 190* 265*   BRYANNA 8.9 9.0 9.0 8.8     CBC  Recent Labs   Lab 07/08/20  0501 07/07/20  0447 07/07/20  0133 07/06/20  0506   WBC 6.0 6.5 5.9 5.8   HGB 13.1* 13.0* 12.9* 12.8*   HCT 40.5 42.0 40.7 41.3   MCV 96 97 96 99    267 246 239     INR  Recent Labs   Lab 07/03/20  1559 07/02/20  0813   INR 1.12 1.09   PTT  --  29     IMAGING:    RHC 6/18/20     RA 20/26/18  RV 85/28  PA 82/45/58  PCWP 45  Cardiac output by Jacy: 3.85 L/min (indexed to 2.09 L/min/m2)  Cardiac output by thermodilution: 3.63 L/min (indexed to 1.97 L/min/m2)  SVR (by TD CO): 1123  PVR (by TD CO): 7.4    Angiogram 6/18/20   3 vessel CAD s/p 3V CABG in 2008 (LIMA-LAD, SVG-OM1, SVG-RPDA)  2. Known proximal SVG-RPDA occlusion  3. Presumed occlusion of SVG-OM1 (unable to locate on non-selective aortic root shot)  4. Patent LIMA-LAD with collateralization of LAD to PDA    Echocardiogram ( 3/11/2019)   Moderate to severe left ventricular dilation is present.  Severely (EF 10-20%) reduced left ventricular function is present.  No significant valve dysfunction.  Compared to study done 6/5/17 there is no significant change in LV size and  systolic function    Echocardiogram ( 7/5/2020)   Interpretation Summary  Severely (EF 10-20%) reduced left ventricular function is present. LVEF 15%  based on biplane 2D tracing. Severe left ventricular dilation is present.  LVIDd:6.8 cm. Grade III or advanced diastolic dysfunction.  The right ventricle is normal size.  Global right ventricular function is moderately reduced.  No significant valvular abnormalities were noted.  IVC diameter and respiratory changes fall into an intermediate range  suggesting an RA pressure of 8 mmHg.  Mild pulmonary hypertension is present.     This study was  compared with the study from 3/25/2019. RV function has  worsened, LV size and function appear similar.    Devices  ICD ([employer]- 4/2/2015 )

## 2020-07-09 ENCOUNTER — APPOINTMENT (OUTPATIENT)
Dept: GENERAL RADIOLOGY | Facility: CLINIC | Age: 65
DRG: 001 | End: 2020-07-09
Attending: INTERNAL MEDICINE
Payer: COMMERCIAL

## 2020-07-09 LAB
ANION GAP SERPL CALCULATED.3IONS-SCNC: 6 MMOL/L (ref 3–14)
ANION GAP SERPL CALCULATED.3IONS-SCNC: 6 MMOL/L (ref 3–14)
BASE DEFICIT BLDV-SCNC: 0.4 MMOL/L
BASE EXCESS BLDV CALC-SCNC: 0 MMOL/L
BASE EXCESS BLDV CALC-SCNC: 0.5 MMOL/L
BASE EXCESS BLDV CALC-SCNC: 1.5 MMOL/L
BUN SERPL-MCNC: 37 MG/DL (ref 7–30)
BUN SERPL-MCNC: 42 MG/DL (ref 7–30)
CALCIUM SERPL-MCNC: 8.3 MG/DL (ref 8.5–10.1)
CALCIUM SERPL-MCNC: 8.5 MG/DL (ref 8.5–10.1)
CHLORIDE SERPL-SCNC: 104 MMOL/L (ref 94–109)
CHLORIDE SERPL-SCNC: 105 MMOL/L (ref 94–109)
CO2 SERPL-SCNC: 24 MMOL/L (ref 20–32)
CO2 SERPL-SCNC: 25 MMOL/L (ref 20–32)
CREAT SERPL-MCNC: 1.61 MG/DL (ref 0.66–1.25)
CREAT SERPL-MCNC: 1.82 MG/DL (ref 0.66–1.25)
ERYTHROCYTE [DISTWIDTH] IN BLOOD BY AUTOMATED COUNT: 15.6 % (ref 10–15)
GFR SERPL CREATININE-BSD FRML MDRD: 38 ML/MIN/{1.73_M2}
GFR SERPL CREATININE-BSD FRML MDRD: 44 ML/MIN/{1.73_M2}
GLUCOSE BLDC GLUCOMTR-MCNC: 172 MG/DL (ref 70–99)
GLUCOSE BLDC GLUCOMTR-MCNC: 205 MG/DL (ref 70–99)
GLUCOSE BLDC GLUCOMTR-MCNC: 218 MG/DL (ref 70–99)
GLUCOSE BLDC GLUCOMTR-MCNC: 226 MG/DL (ref 70–99)
GLUCOSE SERPL-MCNC: 154 MG/DL (ref 70–99)
GLUCOSE SERPL-MCNC: 222 MG/DL (ref 70–99)
HCO3 BLDV-SCNC: 25 MMOL/L (ref 21–28)
HCO3 BLDV-SCNC: 25 MMOL/L (ref 21–28)
HCO3 BLDV-SCNC: 26 MMOL/L (ref 21–28)
HCO3 BLDV-SCNC: 27 MMOL/L (ref 21–28)
HCT VFR BLD AUTO: 37.5 % (ref 40–53)
HGB BLD-MCNC: 12 G/DL (ref 13.3–17.7)
LMWH PPP CHRO-ACNC: 0.16 IU/ML
LMWH PPP CHRO-ACNC: 0.37 IU/ML
MAGNESIUM SERPL-MCNC: 2.2 MG/DL (ref 1.6–2.3)
MAGNESIUM SERPL-MCNC: 2.3 MG/DL (ref 1.6–2.3)
MCH RBC QN AUTO: 30.6 PG (ref 26.5–33)
MCHC RBC AUTO-ENTMCNC: 32 G/DL (ref 31.5–36.5)
MCV RBC AUTO: 96 FL (ref 78–100)
O2/TOTAL GAS SETTING VFR VENT: 21 %
O2/TOTAL GAS SETTING VFR VENT: NORMAL %
OXYHGB MFR BLDV: 58 %
OXYHGB MFR BLDV: 59 %
OXYHGB MFR BLDV: 63 %
OXYHGB MFR BLDV: 66 %
PCO2 BLDV: 42 MM HG (ref 40–50)
PCO2 BLDV: 42 MM HG (ref 40–50)
PCO2 BLDV: 43 MM HG (ref 40–50)
PCO2 BLDV: 43 MM HG (ref 40–50)
PH BLDV: 7.38 PH (ref 7.32–7.43)
PH BLDV: 7.38 PH (ref 7.32–7.43)
PH BLDV: 7.39 PH (ref 7.32–7.43)
PH BLDV: 7.4 PH (ref 7.32–7.43)
PLATELET # BLD AUTO: 209 10E9/L (ref 150–450)
PO2 BLDV: 33 MM HG (ref 25–47)
PO2 BLDV: 33 MM HG (ref 25–47)
PO2 BLDV: 36 MM HG (ref 25–47)
PO2 BLDV: 37 MM HG (ref 25–47)
POTASSIUM SERPL-SCNC: 3.8 MMOL/L (ref 3.4–5.3)
POTASSIUM SERPL-SCNC: 4 MMOL/L (ref 3.4–5.3)
RBC # BLD AUTO: 3.92 10E12/L (ref 4.4–5.9)
SODIUM SERPL-SCNC: 134 MMOL/L (ref 133–144)
SODIUM SERPL-SCNC: 136 MMOL/L (ref 133–144)
WBC # BLD AUTO: 7.6 10E9/L (ref 4–11)

## 2020-07-09 PROCEDURE — 25000125 ZZHC RX 250: Performed by: INTERNAL MEDICINE

## 2020-07-09 PROCEDURE — 80048 BASIC METABOLIC PNL TOTAL CA: CPT | Performed by: INTERNAL MEDICINE

## 2020-07-09 PROCEDURE — 25000132 ZZH RX MED GY IP 250 OP 250 PS 637: Performed by: STUDENT IN AN ORGANIZED HEALTH CARE EDUCATION/TRAINING PROGRAM

## 2020-07-09 PROCEDURE — 25800030 ZZH RX IP 258 OP 636: Performed by: STUDENT IN AN ORGANIZED HEALTH CARE EDUCATION/TRAINING PROGRAM

## 2020-07-09 PROCEDURE — 25000132 ZZH RX MED GY IP 250 OP 250 PS 637: Performed by: SURGERY

## 2020-07-09 PROCEDURE — 71045 X-RAY EXAM CHEST 1 VIEW: CPT

## 2020-07-09 PROCEDURE — 83735 ASSAY OF MAGNESIUM: CPT | Performed by: INTERNAL MEDICINE

## 2020-07-09 PROCEDURE — 85027 COMPLETE CBC AUTOMATED: CPT | Performed by: INTERNAL MEDICINE

## 2020-07-09 PROCEDURE — 00000146 ZZHCL STATISTIC GLUCOSE BY METER IP

## 2020-07-09 PROCEDURE — 25000125 ZZHC RX 250: Performed by: STUDENT IN AN ORGANIZED HEALTH CARE EDUCATION/TRAINING PROGRAM

## 2020-07-09 PROCEDURE — 85520 HEPARIN ASSAY: CPT | Performed by: INTERNAL MEDICINE

## 2020-07-09 PROCEDURE — 99233 SBSQ HOSP IP/OBS HIGH 50: CPT | Mod: GC | Performed by: INTERNAL MEDICINE

## 2020-07-09 PROCEDURE — 25800030 ZZH RX IP 258 OP 636: Performed by: INTERNAL MEDICINE

## 2020-07-09 PROCEDURE — 20000004 ZZH R&B ICU UMMC

## 2020-07-09 PROCEDURE — 25000128 H RX IP 250 OP 636

## 2020-07-09 PROCEDURE — 82805 BLOOD GASES W/O2 SATURATION: CPT | Performed by: INTERNAL MEDICINE

## 2020-07-09 PROCEDURE — 25000132 ZZH RX MED GY IP 250 OP 250 PS 637: Performed by: INTERNAL MEDICINE

## 2020-07-09 RX ORDER — AMIODARONE HYDROCHLORIDE 200 MG/1
400 TABLET ORAL 2 TIMES DAILY
Status: DISCONTINUED | OUTPATIENT
Start: 2020-07-09 | End: 2020-07-13

## 2020-07-09 RX ORDER — LOSARTAN POTASSIUM 50 MG/1
50 TABLET ORAL DAILY
Status: DISCONTINUED | OUTPATIENT
Start: 2020-07-10 | End: 2020-07-12

## 2020-07-09 RX ORDER — HYDRALAZINE HYDROCHLORIDE 25 MG/1
50 TABLET, FILM COATED ORAL 3 TIMES DAILY
Status: DISCONTINUED | OUTPATIENT
Start: 2020-07-09 | End: 2020-07-11

## 2020-07-09 RX ORDER — ISOSORBIDE MONONITRATE 60 MG/1
60 TABLET, EXTENDED RELEASE ORAL DAILY
Status: DISCONTINUED | OUTPATIENT
Start: 2020-07-10 | End: 2020-07-15 | Stop reason: ALTCHOICE

## 2020-07-09 RX ADMIN — ISOSORBIDE MONONITRATE 60 MG: 60 TABLET, EXTENDED RELEASE ORAL at 07:55

## 2020-07-09 RX ADMIN — LOSARTAN POTASSIUM 50 MG: 50 TABLET, FILM COATED ORAL at 07:56

## 2020-07-09 RX ADMIN — POTASSIUM CHLORIDE 20 MEQ: 750 TABLET, EXTENDED RELEASE ORAL at 05:31

## 2020-07-09 RX ADMIN — HYDRALAZINE HYDROCHLORIDE 50 MG: 25 TABLET, FILM COATED ORAL at 19:46

## 2020-07-09 RX ADMIN — Medication 37.5 MG: at 07:56

## 2020-07-09 RX ADMIN — ASPIRIN 81 MG CHEWABLE TABLET 81 MG: 81 TABLET CHEWABLE at 07:55

## 2020-07-09 RX ADMIN — SODIUM THIOSULFATE 1.25 MCG/KG/MIN: 250 INJECTION, SOLUTION INTRAVENOUS at 09:35

## 2020-07-09 RX ADMIN — SODIUM THIOSULFATE 1.25 MCG/KG/MIN: 250 INJECTION, SOLUTION INTRAVENOUS at 03:15

## 2020-07-09 RX ADMIN — AMIODARONE HYDROCHLORIDE 400 MG: 200 TABLET ORAL at 12:08

## 2020-07-09 RX ADMIN — ATORVASTATIN CALCIUM 40 MG: 40 TABLET, FILM COATED ORAL at 07:55

## 2020-07-09 RX ADMIN — AMIODARONE HYDROCHLORIDE 400 MG: 200 TABLET ORAL at 19:46

## 2020-07-09 RX ADMIN — HEPARIN SODIUM 950 UNITS/HR: 10000 INJECTION, SOLUTION INTRAVENOUS at 05:04

## 2020-07-09 RX ADMIN — POTASSIUM CHLORIDE 20 MEQ: 750 TABLET, EXTENDED RELEASE ORAL at 17:56

## 2020-07-09 RX ADMIN — HYDRALAZINE HYDROCHLORIDE 50 MG: 25 TABLET, FILM COATED ORAL at 14:09

## 2020-07-09 RX ADMIN — SODIUM THIOSULFATE 1.5 MCG/KG/MIN: 250 INJECTION, SOLUTION INTRAVENOUS at 23:24

## 2020-07-09 ASSESSMENT — ACTIVITIES OF DAILY LIVING (ADL)
ADLS_ACUITY_SCORE: 12

## 2020-07-09 ASSESSMENT — MIFFLIN-ST. JEOR: SCORE: 1462

## 2020-07-09 NOTE — PLAN OF CARE
ICU End of Shift Summary. See flowsheets for vital signs and detailed assessment.    Changes this shift: Placed on 2L nasal cannula overnight while asleep. LIO done q6h. Nipride titrated to 1.5 mcg/kg/min to keep MAP<70. 8 beat run of vtach noted this am, team notified. Potassium replaced per protocol. Amio gtt continued at 0.5 mg/h. Voiding per urinal, 100 mL UOP overnight. Heparin decreased to 800 unit(s)/h per protocol, next HepXa at 1100.     Plan: Recheck HepXa at 1100.

## 2020-07-09 NOTE — PROGRESS NOTES
Pre-LVAD/Heart Transplant Social Work Services Progress Note      Date of Initial Social Work Evaluation: 7/9/2019, 7/6/2020  Collaborated with: Pt and wife, Shivani, at bedside    Data: SW f/u as part of ongoing LVAD and Heart Transplant evaluation. Met w/ pt and wife using video . Pt and wife had requested copy of HCD. Pt has no concerns with wife making decisions, but wants to get his wishes in writing. Writer spent some time going through document. Writer provided them with two copies of HCD in [place]ian.   José Luis placed today. Pt sitting at edge of bed and engaged. Pt continues to be motivated to move forward with an LVAD or Heart Transplant. Pt to be discussed this Friday at committee meeting.     Intervention: Supportive Visit   Assessment: Pt coping well with hospitalization. Pt asking good questions regarding evaluation. Pt expressed appreciation of care he is receiving.   Education provided by SW: Ongoing Social Work support as part of LVAD/Heart Transplant evaluation, HCD   Plan:    Discharge Plans in Progress: None     Barriers to d/c plan: Pt being presented on Friday to determine plan     Follow up Plan: Writer to continue to follow.

## 2020-07-09 NOTE — PROGRESS NOTES
[facility]   Cardiology II Service / Advanced Heart Failure  Progress note    Lucian Morillo  : 1955  MRN # [id number]    ADMIT DATE: 7/3/2020    Changes today  - Continue nipirde gtt   - Restart PTA imdur and cozzar  - increase hydralazine to 50 mg TID   - Start amiodarone 400 mg BID, stop gtt 2 hours later   - Dental to do tooth extractions   - tentative plan for LVAD vs transplant listing this admission     ASSESSMENT & PLAN:  Lucian Henderson Sr. is a 64 year old male with a PMH of HTN, DMII, obesity, CKD, CHANTEL, ischemic cardiomyopathy and severe LV systolic dysfunction s/p 3V CABG () and primary prevention ICD ( 4/2/15). Currently he presents with 3 days of worsening fatigue and SOB with minimal exertion. Patient claims he could walk 1-2 blocks last week but has been barely able to ambulate home recently.     Admitted for possible LVAD/Transplant work up.    Date RA MPAP PCWP SV02 Jacy CO Jacy CI MAP SVR PVR Intervention   20 8 50 38 49 2.3 1.28 89 2400 5.2    20  6 48 28 35 4.0 2.1 69 1300 2.2 Nipride, PO afterload reduction        #Atrial Flutter with RVR  Patient went into Atrial flutter with RVR with rates around 150 overnight on 2020 at around 2 am. 5 mg IV beta-blocker was administered but this significantly lowered the patient's blood pressure.  - 5 mg IV beta-blocker administered and heparin started on 20 over night when patient went into flutter.    - Stopped Lasix, 250 ml LR Bolus (20)  - IV Amiodarone bolus 150 , followed by 1mg/min infusion for 6h followed by 0.5mg/min, started amiodarone 400 mg PO BID  (will do BID dosing for ~ 5 days,  can go to daily dosing)   - Digoxin  mcg (20)    # ICM s/p 3V CABG () and primary prevention ICD ( 4/2/15)  #HFrEF Stage D, NYHA III-IV  # Ambulatory cardiogenic shock  # Arrhythmia: HF associated VT/VF  Currently he presents with 3 days of worsening fatigue and SOB with  "minimal exertion, cardiac cachexia and cold extremities. Admitted for possible LVAD/Transplant work up. - Echocardiogram showed an EF of 15% on 7/5/20  - Diuresis : Lasix drip of 15 mg/hr (held)  - ASA 81 mg daily  - Atorvastatin 40 daily  - Nipirde gtt started 7/8  -Hydralazine 50 TID   - IMDUR 60 mg starting on 7/9/20  - Losartan 50 mg starting on 7/9/20  -Started LVAD/ Transplant work up with consults for Neuro/Psyc, CVTS, social, dental, palliative   - LVAD show and tell to be done during admission, LVAD coordinator on call paged  - He has had a colonoscopy on 8/7/19   - Hemodynamics as above     # DMII ( A1c: 8.3)   - Finger sticks  - Hypoglycemia protocol   - Moderate intensity sliding scale insulin       #CKD  - Monitor Scr and K Q12h   - Avoid nephrotoxic agents    Floor Care  Fluids: 1.5L fluid restriction  Food: 2 gram sodium diet, low fat diet  Electrolyte repletion: potassium/magnesium sliding scale  DVT ppx: Lovenox  Glycemic Control: sliding scale insulin   Lines: PIV x1  Consults: none  Code Status: full    Discharge plan: inpatient admission, anticipate discharge TBD    Patient was discussed with attending physician, Dr. Arvin MD.    Héctor Smith MD   Cardiology Fellow, PGY-5  [phone]     ..........................................................................................................................................................  Subjective:  SARITA overnight. Updated patient and wife with plan using . No CP, SOB, nausea, vomiting.     PHYSICAL EXAM:  /73   Pulse 90   Temp 98.3  F (36.8  C) (Oral)   Resp 18   Ht 1.626 m (5' 4\")   Wt 77.1 kg (169 lb 15.6 oz)   SpO2 99%   BMI 29.18 kg/m    GENERAL: NAD  NECK: Supple and without lymphadenopathy. JVP 6cm  CV: S1/S2 heard without murmur   RESPIRATORY: CTAB  GI: Soft and distended. No tenderness, rebound, guarding. No palpable organomegaly.   EXTREMITIES: Peripheral edema trace.   NEUROLOGIC: Alert and " orientated x 3.   MUSCULOSKELETAL: No joint swelling or tenderness.   SKIN: No jaundice. No acute rashes or lesions.     ROS:   12-pt ROS otherwise negative except as noted above    PMH:  Past Medical History:   Diagnosis Date     CAD (coronary artery disease)      CHF (congestive heart failure) (H)      CKD (chronic kidney disease), stage III (H)      Cortical cataract of both eyes      Diabetes (H)      Hyperlipidemia      Hypertension      Ischemic cardiomyopathy      Obesity      CHANTEL (obstructive sleep apnea)     occas cpap     Osteoarthritis        PSH:  Past Surgical History:   Procedure Laterality Date     C CABG, ARTERY-VEIN, THREE  02/2008     CATARACT IOL, RT/LT       COLONOSCOPY N/A 8/7/2019    Procedure: COLONOSCOPY, WITH POLYPECTOMY AND BIOPSY;  Surgeon: Chauncey Morataya MD;  Location:  GI     CV RIGHT HEART CATH N/A 3/25/2019    Procedure: CV RIGHT HEART CATH;  Surgeon: Moises Santos MD;  Location:  HEART CARDIAC CATH LAB     CV RIGHT HEART CATH N/A 7/10/2019    Procedure: CV RIGHT HEART CATH;  Surgeon: Jak Mccabe MD;  Location:  HEART CARDIAC CATH LAB     CV RIGHT HEART CATH N/A 7/8/2020    Procedure: Right Heart Cath with Leave In [place] already has ICU load;  Surgeon: Jak Mccabe MD;  Location:  HEART CARDIAC CATH LAB     IMPLANT AUTOMATIC IMPLANTABLE CARDIOVERTER DEFIBRILLATOR       PHACOEMULSIFICATION CLEAR CORNEA WITH STANDARD IOL, VITRECTOMY PARSPLANA 25 GAGUE, COMBINED Left 12/10/2019    Procedure: PHACOEMULSIFICATION, CATARACT, CLEAR CORNEAL INCISION APPROACH, W STD INTRAOCULAR LENS IMPLANT INSERT + VITRECTOMY BY PARS PLANA  USING 25-GAUGE INSTRUMENTS. ENDOLASER, INFUSION OF 20% SF6 GAS;  Surgeon: Triny Bunch MD;  Location: [facility] OR       MEDICATIONS:  Prior to Admission Medications   Prescriptions Last Dose Informant Patient Reported? Taking?   aspirin 81 MG tablet  Self No No   Sig: Take 1 tablet (81 mg) by mouth daily   atorvastatin (LIPITOR) 40 MG  tablet  Self No No   Sig: Take 1 tablet (40 mg) by mouth daily   blood glucose (ACCU-CHEK SMARTVIEW) test strip  Self No No   Sig: USE TO TEST THREE TIMES DAILY AS DIRECTED   blood glucose (NO BRAND SPECIFIED) test strip  Self No No   Sig: Use to test blood sugar 2 times daily or as directed.   blood glucose monitoring (ACCU-CHEK FELIPE SMARTVIEW) meter device kit  Self No No   Sig: Use to test blood sugar 3-4 times daily, as directed.   blood glucose monitoring (ONE TOUCH DELICA) lancets  Self No No   Sig: Use to test blood sugars 2 times daily.   clopidogrel (PLAVIX) 75 MG tablet  Self No No   Sig: Take 1 tablet (75 mg) by mouth daily   exenatide ER (BYDUREON) 2 MG pen  Self No No   Sig: Inject 2 mg Subcutaneous every 7 days   furosemide (LASIX) 20 MG tablet  Self No No   Sig: Take 2 tablets (40 mg) by mouth 2 times daily   glimepiride (AMARYL) 4 MG tablet  Self No No   Sig: Take 1 tablet (4 mg) by mouth every morning (before breakfast)   hydrALAZINE (APRESOLINE) 25 MG tablet  Self No No   Sig: Take 1 tablet (25 mg) by mouth 3 times daily   insulin glargine (LANTUS SOLOSTAR) 100 UNIT/ML pen  Self No No   Sig: Take 8 units in the morning and 40 units in the evening   insulin pen needle (B-D U/F) 31G X 5 MM miscellaneous  Self No No   Si Units by Device route 2 times daily Use 2 pen needles daily or as directed.   isosorbide mononitrate (IMDUR) 60 MG 24 hr tablet  Self No No   Sig: Take 1 tablet (60 mg) by mouth daily   losartan (COZAAR) 50 MG tablet  Spouse/Significant Other No No   Sig: Take 1 tablet (50 mg) by mouth daily   nitroglycerin (NITROSTAT) 0.4 MG SL tablet  Self No No   Sig: Place 1 tablet (0.4 mg) under the tongue every 5 minutes as needed for chest pain If you are still having symptoms after 3 doses (15 minutes) call 911.   order for DME  Self No No   Sig: Auto CPAP 8-15 cmH20      Facility-Administered Medications Last Administration Doses Remaining   erythromycin (ROMYCIN) ophthalmic ointment None  recorded    lidocaine (PF) (XYLOCAINE) 2 % injection 10 mL None recorded 1           ALLERGIES:     Allergies   Allergen Reactions     No Known Drug Allergies        FAMILY HISTORY:  Family History   Problem Relation Age of Onset     Diabetes Brother      Diabetes Sister      Diabetes Sister      Macular Degeneration No family hx of      Glaucoma No family hx of      Myocardial Infarction No family hx of      Kidney Disease No family hx of        SOCIAL HISTORY:  Social History     Socioeconomic History     Marital status:      Spouse name: Not on file     Number of children: 4     Years of education: Not on file     Highest education level: Not on file   Occupational History     Occupation:      Employer: [employer]     Employer: RETIRED   Social Needs     Financial resource strain: Not on file     Food insecurity     Worry: Not on file     Inability: Not on file     Transportation needs     Medical: Not on file     Non-medical: Not on file   Tobacco Use     Smoking status: Never Smoker     Smokeless tobacco: Never Used     Tobacco comment: Never smoked; non-smoking household   Substance and Sexual Activity     Alcohol use: No     Alcohol/week: 0.0 standard drinks     Drug use: No     Sexual activity: Yes     Partners: Female   Lifestyle     Physical activity     Days per week: Not on file     Minutes per session: Not on file     Stress: Not on file   Relationships     Social connections     Talks on phone: Not on file     Gets together: Not on file     Attends Taoist service: Not on file     Active member of club or organization: Not on file     Attends meetings of clubs or organizations: Not on file     Relationship status: Not on file     Intimate partner violence     Fear of current or ex partner: Not on file     Emotionally abused: Not on file     Physically abused: Not on file     Forced sexual activity: Not on file   Other Topics Concern     Parent/sibling w/ CABG, MI or  angioplasty before 65F 55M? No   Social History Narrative     Not on file       LABS:  BMP  Recent Labs   Lab 07/09/20  0441 07/08/20  1634 07/08/20  0501 07/07/20  1732    134 135 133   POTASSIUM 3.8 4.2 4.3 5.0   CHLORIDE 105 103 103 102   CO2 25 25 23 22   BUN 42* 46* 43* 45*   CR 1.82* 1.82* 1.89* 1.97*   * 231* 184* 259*   BRYANNA 8.5 8.6 8.9 9.0     CBC  Recent Labs   Lab 07/09/20  0441 07/08/20  0501 07/07/20  0447 07/07/20  0133   WBC 7.6 6.0 6.5 5.9   HGB 12.0* 13.1* 13.0* 12.9*   HCT 37.5* 40.5 42.0 40.7   MCV 96 96 97 96    257 267 246     INR  Recent Labs   Lab 07/03/20  1559 07/02/20  0813   INR 1.12 1.09   PTT  --  29     IMAGING:    RHC 6/18/20     RA 20/26/18  RV 85/28  PA 82/45/58  PCWP 45  Cardiac output by Jacy: 3.85 L/min (indexed to 2.09 L/min/m2)  Cardiac output by thermodilution: 3.63 L/min (indexed to 1.97 L/min/m2)  SVR (by TD CO): 1123  PVR (by TD CO): 7.4    Angiogram 6/18/20   3 vessel CAD s/p 3V CABG in 2008 (LIMA-LAD, SVG-OM1, SVG-RPDA)  2. Known proximal SVG-RPDA occlusion  3. Presumed occlusion of SVG-OM1 (unable to locate on non-selective aortic root shot)  4. Patent LIMA-LAD with collateralization of LAD to PDA    Echocardiogram ( 3/11/2019)   Moderate to severe left ventricular dilation is present.  Severely (EF 10-20%) reduced left ventricular function is present.  No significant valve dysfunction.  Compared to study done 6/5/17 there is no significant change in LV size and  systolic function    Echocardiogram ( 7/5/2020)   Interpretation Summary  Severely (EF 10-20%) reduced left ventricular function is present. LVEF 15%  based on biplane 2D tracing. Severe left ventricular dilation is present.  LVIDd:6.8 cm. Grade III or advanced diastolic dysfunction.  The right ventricle is normal size.  Global right ventricular function is moderately reduced.  No significant valvular abnormalities were noted.  IVC diameter and respiratory changes fall into an intermediate  range  suggesting an RA pressure of 8 mmHg.  Mild pulmonary hypertension is present.     This study was compared with the study from 3/25/2019. RV function has  worsened, LV size and function appear similar.    Devices  ICD ([employer]- 4/2/2015 )

## 2020-07-09 NOTE — PLAN OF CARE
ICU End of Shift Summary. See flowsheets for vital signs and detailed assessment.    Changes this shift: Alert,  used for MD rounds. Ambulated halls with minimal assistance. Amiodarone infusion transitioned to oral.  Increased hydralazine, weaning down nipride. CO/CI 3.5/1.9  Increased lantus from 20 to 25 units.     Plan: Continue to monitor LIO numbers.

## 2020-07-09 NOTE — PLAN OF CARE
ICU End of Shift Summary. See flowsheets for vital signs and detailed assessment.    Changes this shift: Remains SR on monitor. HR 90's. [place] placed in cardiac cath lab. Started nitroprusside to keep MAP < 70. Amiodarone and heparin infusing. Blood sugars elevated 150-200's, Lantus restarted.    Plan: Check Jacy numbers Q6H. Continue heart transplant work up.

## 2020-07-10 ENCOUNTER — APPOINTMENT (OUTPATIENT)
Dept: ULTRASOUND IMAGING | Facility: CLINIC | Age: 65
DRG: 001 | End: 2020-07-10
Attending: INTERNAL MEDICINE
Payer: COMMERCIAL

## 2020-07-10 ENCOUNTER — PREP FOR PROCEDURE (OUTPATIENT)
Dept: CARDIOLOGY | Facility: CLINIC | Age: 65
End: 2020-07-10

## 2020-07-10 ENCOUNTER — APPOINTMENT (OUTPATIENT)
Dept: GENERAL RADIOLOGY | Facility: CLINIC | Age: 65
DRG: 001 | End: 2020-07-10
Attending: STUDENT IN AN ORGANIZED HEALTH CARE EDUCATION/TRAINING PROGRAM
Payer: COMMERCIAL

## 2020-07-10 ENCOUNTER — APPOINTMENT (OUTPATIENT)
Dept: GENERAL RADIOLOGY | Facility: CLINIC | Age: 65
DRG: 001 | End: 2020-07-10
Attending: INTERNAL MEDICINE
Payer: COMMERCIAL

## 2020-07-10 DIAGNOSIS — I50.9 HEART FAILURE (H): Primary | ICD-10-CM

## 2020-07-10 LAB
ANION GAP SERPL CALCULATED.3IONS-SCNC: 3 MMOL/L (ref 3–14)
ANION GAP SERPL CALCULATED.3IONS-SCNC: 5 MMOL/L (ref 3–14)
BASE DEFICIT BLDV-SCNC: 0.4 MMOL/L
BASE DEFICIT BLDV-SCNC: 0.6 MMOL/L
BUN SERPL-MCNC: 30 MG/DL (ref 7–30)
BUN SERPL-MCNC: 33 MG/DL (ref 7–30)
CALCIUM SERPL-MCNC: 8.1 MG/DL (ref 8.5–10.1)
CALCIUM SERPL-MCNC: 8.5 MG/DL (ref 8.5–10.1)
CHLORIDE SERPL-SCNC: 108 MMOL/L (ref 94–109)
CHLORIDE SERPL-SCNC: 109 MMOL/L (ref 94–109)
CO2 SERPL-SCNC: 24 MMOL/L (ref 20–32)
CO2 SERPL-SCNC: 26 MMOL/L (ref 20–32)
CREAT SERPL-MCNC: 1.55 MG/DL (ref 0.66–1.25)
CREAT SERPL-MCNC: 1.61 MG/DL (ref 0.66–1.25)
ERYTHROCYTE [DISTWIDTH] IN BLOOD BY AUTOMATED COUNT: 15.7 % (ref 10–15)
GFR SERPL CREATININE-BSD FRML MDRD: 44 ML/MIN/{1.73_M2}
GFR SERPL CREATININE-BSD FRML MDRD: 46 ML/MIN/{1.73_M2}
GLUCOSE BLDC GLUCOMTR-MCNC: 155 MG/DL (ref 70–99)
GLUCOSE BLDC GLUCOMTR-MCNC: 155 MG/DL (ref 70–99)
GLUCOSE BLDC GLUCOMTR-MCNC: 156 MG/DL (ref 70–99)
GLUCOSE BLDC GLUCOMTR-MCNC: 162 MG/DL (ref 70–99)
GLUCOSE BLDC GLUCOMTR-MCNC: 300 MG/DL (ref 70–99)
GLUCOSE SERPL-MCNC: 144 MG/DL (ref 70–99)
GLUCOSE SERPL-MCNC: 177 MG/DL (ref 70–99)
HCO3 BLDV-SCNC: 25 MMOL/L (ref 21–28)
HCO3 BLDV-SCNC: 25 MMOL/L (ref 21–28)
HCT VFR BLD AUTO: 36.2 % (ref 40–53)
HGB BLD-MCNC: 11.4 G/DL (ref 13.3–17.7)
LMWH PPP CHRO-ACNC: 0.14 IU/ML
LMWH PPP CHRO-ACNC: 0.24 IU/ML
MAGNESIUM SERPL-MCNC: 2.1 MG/DL (ref 1.6–2.3)
MAGNESIUM SERPL-MCNC: 2.2 MG/DL (ref 1.6–2.3)
MCH RBC QN AUTO: 30.5 PG (ref 26.5–33)
MCHC RBC AUTO-ENTMCNC: 31.5 G/DL (ref 31.5–36.5)
MCV RBC AUTO: 97 FL (ref 78–100)
O2/TOTAL GAS SETTING VFR VENT: 21 %
O2/TOTAL GAS SETTING VFR VENT: 21 %
OXYHGB MFR BLDV: 57 %
OXYHGB MFR BLDV: 68 %
PCO2 BLDV: 42 MM HG (ref 40–50)
PCO2 BLDV: 43 MM HG (ref 40–50)
PH BLDV: 7.38 PH (ref 7.32–7.43)
PH BLDV: 7.38 PH (ref 7.32–7.43)
PLATELET # BLD AUTO: 185 10E9/L (ref 150–450)
PO2 BLDV: 32 MM HG (ref 25–47)
PO2 BLDV: 38 MM HG (ref 25–47)
POTASSIUM SERPL-SCNC: 4.2 MMOL/L (ref 3.4–5.3)
POTASSIUM SERPL-SCNC: 4.3 MMOL/L (ref 3.4–5.3)
PSA SERPL-MCNC: 0.12 UG/L (ref 0–4)
RBC # BLD AUTO: 3.74 10E12/L (ref 4.4–5.9)
SA1 CELL: NORMAL
SA1 COMMENTS: NORMAL
SA1 HI RISK ABY: NORMAL
SA1 MOD RISK ABY: NORMAL
SA1 TEST METHOD: NORMAL
SA2 CELL: NORMAL
SA2 COMMENTS: NORMAL
SA2 HI RISK ABY UA: NORMAL
SA2 MOD RISK ABY: NORMAL
SA2 TEST METHOD: NORMAL
SODIUM SERPL-SCNC: 136 MMOL/L (ref 133–144)
SODIUM SERPL-SCNC: 138 MMOL/L (ref 133–144)
UNACCEPTABLE ANTIGEN: NORMAL
UNOS CPRA: 0
WBC # BLD AUTO: 7.4 10E9/L (ref 4–11)

## 2020-07-10 PROCEDURE — 84153 ASSAY OF PSA TOTAL: CPT | Performed by: INTERNAL MEDICINE

## 2020-07-10 PROCEDURE — 40000986 XR CHEST PORT 1 VW

## 2020-07-10 PROCEDURE — 71045 X-RAY EXAM CHEST 1 VIEW: CPT

## 2020-07-10 PROCEDURE — 20000004 ZZH R&B ICU UMMC

## 2020-07-10 PROCEDURE — 25000132 ZZH RX MED GY IP 250 OP 250 PS 637: Performed by: STUDENT IN AN ORGANIZED HEALTH CARE EDUCATION/TRAINING PROGRAM

## 2020-07-10 PROCEDURE — 25000132 ZZH RX MED GY IP 250 OP 250 PS 637: Performed by: SURGERY

## 2020-07-10 PROCEDURE — 00000146 ZZHCL STATISTIC GLUCOSE BY METER IP

## 2020-07-10 PROCEDURE — 99233 SBSQ HOSP IP/OBS HIGH 50: CPT | Mod: GC | Performed by: INTERNAL MEDICINE

## 2020-07-10 PROCEDURE — 25000128 H RX IP 250 OP 636

## 2020-07-10 PROCEDURE — 93931 UPPER EXTREMITY STUDY: CPT

## 2020-07-10 PROCEDURE — 85520 HEPARIN ASSAY: CPT | Performed by: INTERNAL MEDICINE

## 2020-07-10 PROCEDURE — 82805 BLOOD GASES W/O2 SATURATION: CPT | Performed by: INTERNAL MEDICINE

## 2020-07-10 PROCEDURE — 83735 ASSAY OF MAGNESIUM: CPT | Performed by: INTERNAL MEDICINE

## 2020-07-10 PROCEDURE — 25000132 ZZH RX MED GY IP 250 OP 250 PS 637: Performed by: INTERNAL MEDICINE

## 2020-07-10 PROCEDURE — 85027 COMPLETE CBC AUTOMATED: CPT | Performed by: INTERNAL MEDICINE

## 2020-07-10 PROCEDURE — 80048 BASIC METABOLIC PNL TOTAL CA: CPT | Performed by: INTERNAL MEDICINE

## 2020-07-10 RX ORDER — FUROSEMIDE 40 MG
40 TABLET ORAL
Status: DISCONTINUED | OUTPATIENT
Start: 2020-07-10 | End: 2020-07-11

## 2020-07-10 RX ADMIN — ASPIRIN 81 MG CHEWABLE TABLET 81 MG: 81 TABLET CHEWABLE at 09:05

## 2020-07-10 RX ADMIN — FUROSEMIDE 40 MG: 40 TABLET ORAL at 16:06

## 2020-07-10 RX ADMIN — DOCUSATE SODIUM AND SENNOSIDES 2 TABLET: 8.6; 5 TABLET ORAL at 19:45

## 2020-07-10 RX ADMIN — AMIODARONE HYDROCHLORIDE 400 MG: 200 TABLET ORAL at 19:45

## 2020-07-10 RX ADMIN — HYDRALAZINE HYDROCHLORIDE 50 MG: 25 TABLET, FILM COATED ORAL at 19:44

## 2020-07-10 RX ADMIN — HYDRALAZINE HYDROCHLORIDE 50 MG: 25 TABLET, FILM COATED ORAL at 09:05

## 2020-07-10 RX ADMIN — HYDRALAZINE HYDROCHLORIDE 50 MG: 25 TABLET, FILM COATED ORAL at 14:05

## 2020-07-10 RX ADMIN — HEPARIN SODIUM 950 UNITS/HR: 10000 INJECTION, SOLUTION INTRAVENOUS at 09:46

## 2020-07-10 RX ADMIN — AMIODARONE HYDROCHLORIDE 400 MG: 200 TABLET ORAL at 09:05

## 2020-07-10 RX ADMIN — ATORVASTATIN CALCIUM 40 MG: 40 TABLET, FILM COATED ORAL at 09:05

## 2020-07-10 RX ADMIN — LOSARTAN POTASSIUM 50 MG: 50 TABLET, FILM COATED ORAL at 09:05

## 2020-07-10 RX ADMIN — ISOSORBIDE MONONITRATE 60 MG: 60 TABLET, EXTENDED RELEASE ORAL at 09:05

## 2020-07-10 ASSESSMENT — ACTIVITIES OF DAILY LIVING (ADL)
ADLS_ACUITY_SCORE: 12

## 2020-07-10 ASSESSMENT — MIFFLIN-ST. JEOR: SCORE: 1488

## 2020-07-10 NOTE — CONSULTS
The neuropsychology service does not have the capacity to conduct appropriate cognitive testing for Mr. Tee Henderson on an inpatient basis at this time. I would be happy to arrange for an outpatient visit if needed. I have communicated with Dr. Sheridan about this.     Chauncey Livingston, PhD, LP, ABPP-CN  Board Certified in Clinical Neuropsychology ([id number])

## 2020-07-10 NOTE — PROGRESS NOTES
[facility]   Cardiology II Service / Advanced Heart Failure  Progress note    Lucian Morillo  : 1955  MRN # [id number]    ADMIT DATE: 7/3/2020    Changes today  - wean nipirde gtt   - continue PTA imdur and cozzar   - continue increased hydralazine dose of 50 mg TID, can increase later today if not off nipride    - dental to schedule tooth extractions (plan is for )  - UE arterial US, tentative plan for subclavian IABP and transplant listing next week (Tuesday)      ASSESSMENT & PLAN:  Lucian Henderson Sr. is a 64 year old male with a PMH of HTN, DMII, obesity, CKD, CHANTEL, ischemic cardiomyopathy and severe LV systolic dysfunction s/p 3V CABG () and primary prevention ICD ( 4/2/15). Currently he presents with 3 days of worsening fatigue and SOB with minimal exertion. Patient claims he could walk 1-2 blocks last week but has been barely able to ambulate home recently.     Admitted for possible LVAD/Transplant work up.    Date RA MPAP PCWP SV02 Jacy CO Jacy CI MAP SVR PVR Intervention   20 8 50 38 49 2.3 1.28 89 2400 5.2    20  6 48 28 35 4.0 2.1 69 1300 2.2 Nipride, PO afterload reduction    7/10 9 56/34 32 68 5.1 2.7 68 900 1.8 Nipride 1, PO afterload reduction       #Atrial Flutter with RVR  Patient went into Atrial flutter with RVR with rates around 150 overnight on 2020 at around 2 am. 5 mg IV beta-blocker was administered but this significantly lowered the patient's blood pressure.  - 5 mg IV beta-blocker administered and heparin started on 20 over night when patient went into flutter.    - Stopped Lasix, 250 ml LR Bolus (20)  - IV Amiodarone bolus 150 , followed by 1mg/min infusion for 6h followed by 0.5mg/min, started amiodarone 400 mg PO BID  (will do BID dosing for ~ 5 days,  can go to daily dosing)   - Digoxin  mcg (20)    # ICM s/p 3V CABG () and primary prevention ICD ( 4/2/15)  #HFrEF Stage D, NYHA III-IV  #  "Ambulatory cardiogenic shock  # Arrhythmia: HF associated VT/VF  Currently he presents with 3 days of worsening fatigue and SOB with minimal exertion, cardiac cachexia and cold extremities. Admitted for possible LVAD/Transplant work up. - Echocardiogram showed an EF of 15% on 7/5/20  - Diuresis : lasix 40 mg PO BID   - ASA 81 mg daily  - Atorvastatin 40 daily  - Nipirde gtt started 7/8  -Hydralazine 50 TID   - IMDUR 60 mg starting on 7/9/20  - Losartan 50 mg starting on 7/9/20  -Started LVAD/ Transplant work up with consults for Neuro/Psyc, CVTS, social, dental, palliative   - LVAD show and tell to be done during admission, LVAD coordinator on call paged  - He has had a colonoscopy on 8/7/19   - Hemodynamics as above     # DMII ( A1c: 8.3)   - Finger sticks  - Hypoglycemia protocol   - Moderate intensity sliding scale insulin       #CKD  - Monitor Scr and K Q12h   - Avoid nephrotoxic agents    Floor Care  Fluids: 1.5L fluid restriction  Food: 2 gram sodium diet, low fat diet  Electrolyte repletion: potassium/magnesium sliding scale  DVT ppx: Lovenox  Glycemic Control: sliding scale insulin   Lines: PIV x1  Consults: none  Code Status: full    Discharge plan: inpatient admission, anticipate discharge TBD    Patient was discussed with attending physician, Dr. Anand.     Héctor Smith MD   Cardiology Fellow, PGY-5  [phone]     ..........................................................................................................................................................  Subjective:  SARITA overnight. Updated patient and wife with plan using . No CP, SOB, nausea, vomiting.     PHYSICAL EXAM:  /65   Pulse 96   Temp 98.6  F (37  C) (Oral)   Resp 16   Ht 1.626 m (5' 4\")   Wt 79.7 kg (175 lb 11.3 oz)   SpO2 99%   BMI 30.16 kg/m    GENERAL: NAD  NECK: Supple and without lymphadenopathy. JVP 6cm  CV: S1/S2 heard without murmur   RESPIRATORY: CTAB  GI: Soft and distended. No " tenderness, rebound, guarding. No palpable organomegaly.   EXTREMITIES: Peripheral edema trace.   NEUROLOGIC: Alert and orientated x 3.   MUSCULOSKELETAL: No joint swelling or tenderness.   SKIN: No jaundice. No acute rashes or lesions.     ROS:   12-pt ROS otherwise negative except as noted above    PMH:  Past Medical History:   Diagnosis Date     CAD (coronary artery disease)      CHF (congestive heart failure) (H)      CKD (chronic kidney disease), stage III (H)      Cortical cataract of both eyes      Diabetes (H)      Hyperlipidemia      Hypertension      Ischemic cardiomyopathy      Obesity      CHANTEL (obstructive sleep apnea)     occas cpap     Osteoarthritis        PSH:  Past Surgical History:   Procedure Laterality Date     C CABG, ARTERY-VEIN, THREE  02/2008     CATARACT IOL, RT/LT       COLONOSCOPY N/A 8/7/2019    Procedure: COLONOSCOPY, WITH POLYPECTOMY AND BIOPSY;  Surgeon: Chauncey Morataya MD;  Location:  GI     CV RIGHT HEART CATH N/A 3/25/2019    Procedure: CV RIGHT HEART CATH;  Surgeon: Moises Santos MD;  Location:  HEART CARDIAC CATH LAB     CV RIGHT HEART CATH N/A 7/10/2019    Procedure: CV RIGHT HEART CATH;  Surgeon: Jak Mccabe MD;  Location:  HEART CARDIAC CATH LAB     CV RIGHT HEART CATH N/A 7/8/2020    Procedure: Right Heart Cath with Leave In Bell already has ICU load;  Surgeon: Jak Mccabe MD;  Location:  HEART CARDIAC CATH LAB     IMPLANT AUTOMATIC IMPLANTABLE CARDIOVERTER DEFIBRILLATOR       PHACOEMULSIFICATION CLEAR CORNEA WITH STANDARD IOL, VITRECTOMY PARSPLANA 25 GAGUE, COMBINED Left 12/10/2019    Procedure: PHACOEMULSIFICATION, CATARACT, CLEAR CORNEAL INCISION APPROACH, W STD INTRAOCULAR LENS IMPLANT INSERT + VITRECTOMY BY PARS PLANA  USING 25-GAUGE INSTRUMENTS. ENDOLASER, INFUSION OF 20% SF6 GAS;  Surgeon: Triny Bunch MD;  Location: [facility] OR       MEDICATIONS:  Prior to Admission Medications   Prescriptions Last Dose Informant Patient Reported?  Taking?   aspirin 81 MG tablet  Self No No   Sig: Take 1 tablet (81 mg) by mouth daily   atorvastatin (LIPITOR) 40 MG tablet  Self No No   Sig: Take 1 tablet (40 mg) by mouth daily   blood glucose (ACCU-CHEK SMARTVIEW) test strip  Self No No   Sig: USE TO TEST THREE TIMES DAILY AS DIRECTED   blood glucose (NO BRAND SPECIFIED) test strip  Self No No   Sig: Use to test blood sugar 2 times daily or as directed.   blood glucose monitoring (ACCU-CHEK FELIPE SMARTVIEW) meter device kit  Self No No   Sig: Use to test blood sugar 3-4 times daily, as directed.   blood glucose monitoring (ONE TOUCH DELICA) lancets  Self No No   Sig: Use to test blood sugars 2 times daily.   clopidogrel (PLAVIX) 75 MG tablet  Self No No   Sig: Take 1 tablet (75 mg) by mouth daily   exenatide ER (BYDUREON) 2 MG pen  Self No No   Sig: Inject 2 mg Subcutaneous every 7 days   furosemide (LASIX) 20 MG tablet  Self No No   Sig: Take 2 tablets (40 mg) by mouth 2 times daily   glimepiride (AMARYL) 4 MG tablet  Self No No   Sig: Take 1 tablet (4 mg) by mouth every morning (before breakfast)   hydrALAZINE (APRESOLINE) 25 MG tablet  Self No No   Sig: Take 1 tablet (25 mg) by mouth 3 times daily   insulin glargine (LANTUS SOLOSTAR) 100 UNIT/ML pen  Self No No   Sig: Take 8 units in the morning and 40 units in the evening   insulin pen needle (B-D U/F) 31G X 5 MM miscellaneous  Self No No   Si Units by Device route 2 times daily Use 2 pen needles daily or as directed.   isosorbide mononitrate (IMDUR) 60 MG 24 hr tablet  Self No No   Sig: Take 1 tablet (60 mg) by mouth daily   losartan (COZAAR) 50 MG tablet  Spouse/Significant Other No No   Sig: Take 1 tablet (50 mg) by mouth daily   nitroglycerin (NITROSTAT) 0.4 MG SL tablet  Self No No   Sig: Place 1 tablet (0.4 mg) under the tongue every 5 minutes as needed for chest pain If you are still having symptoms after 3 doses (15 minutes) call 911.   order for DME  Self No No   Sig: Auto CPAP 8-15 cmH20       Facility-Administered Medications Last Administration Doses Remaining   erythromycin (ROMYCIN) ophthalmic ointment None recorded    lidocaine (PF) (XYLOCAINE) 2 % injection 10 mL None recorded 1           ALLERGIES:     Allergies   Allergen Reactions     No Known Drug Allergies        FAMILY HISTORY:  Family History   Problem Relation Age of Onset     Diabetes Brother      Diabetes Sister      Diabetes Sister      Macular Degeneration No family hx of      Glaucoma No family hx of      Myocardial Infarction No family hx of      Kidney Disease No family hx of        SOCIAL HISTORY:  Social History     Socioeconomic History     Marital status:      Spouse name: Not on file     Number of children: 4     Years of education: Not on file     Highest education level: Not on file   Occupational History     Occupation:      Employer: [employer]     Employer: RETIRED   Social Needs     Financial resource strain: Not on file     Food insecurity     Worry: Not on file     Inability: Not on file     Transportation needs     Medical: Not on file     Non-medical: Not on file   Tobacco Use     Smoking status: Never Smoker     Smokeless tobacco: Never Used     Tobacco comment: Never smoked; non-smoking household   Substance and Sexual Activity     Alcohol use: No     Alcohol/week: 0.0 standard drinks     Drug use: No     Sexual activity: Yes     Partners: Female   Lifestyle     Physical activity     Days per week: Not on file     Minutes per session: Not on file     Stress: Not on file   Relationships     Social connections     Talks on phone: Not on file     Gets together: Not on file     Attends Yazidism service: Not on file     Active member of club or organization: Not on file     Attends meetings of clubs or organizations: Not on file     Relationship status: Not on file     Intimate partner violence     Fear of current or ex partner: Not on file     Emotionally abused: Not on file     Physically  abused: Not on file     Forced sexual activity: Not on file   Other Topics Concern     Parent/sibling w/ CABG, MI or angioplasty before 65F 55M? No   Social History Narrative     Not on file       LABS:  BMP  Recent Labs   Lab 07/10/20  0332 07/09/20  1621 07/09/20  0441 07/08/20  1634    134 136 134   POTASSIUM 4.2 4.0 3.8 4.2   CHLORIDE 108 104 105 103   CO2 26 24 25 25   BUN 33* 37* 42* 46*   CR 1.61* 1.61* 1.82* 1.82*   * 222* 154* 231*   BRYANNA 8.5 8.3* 8.5 8.6     CBC  Recent Labs   Lab 07/10/20  0332 07/09/20  0441 07/08/20  0501 07/07/20  0447   WBC 7.4 7.6 6.0 6.5   HGB 11.4* 12.0* 13.1* 13.0*   HCT 36.2* 37.5* 40.5 42.0   MCV 97 96 96 97    209 257 267     INR  Recent Labs   Lab 07/03/20  1559   INR 1.12     IMAGING:    RHC 6/18/20     RA 20/26/18  RV 85/28  PA 82/45/58  PCWP 45  Cardiac output by Jacy: 3.85 L/min (indexed to 2.09 L/min/m2)  Cardiac output by thermodilution: 3.63 L/min (indexed to 1.97 L/min/m2)  SVR (by TD CO): 1123  PVR (by TD CO): 7.4    Angiogram 6/18/20   3 vessel CAD s/p 3V CABG in 2008 (LIMA-LAD, SVG-OM1, SVG-RPDA)  2. Known proximal SVG-RPDA occlusion  3. Presumed occlusion of SVG-OM1 (unable to locate on non-selective aortic root shot)  4. Patent LIMA-LAD with collateralization of LAD to PDA    Echocardiogram ( 3/11/2019)   Moderate to severe left ventricular dilation is present.  Severely (EF 10-20%) reduced left ventricular function is present.  No significant valve dysfunction.  Compared to study done 6/5/17 there is no significant change in LV size and  systolic function    Echocardiogram ( 7/5/2020)   Interpretation Summary  Severely (EF 10-20%) reduced left ventricular function is present. LVEF 15%  based on biplane 2D tracing. Severe left ventricular dilation is present.  LVIDd:6.8 cm. Grade III or advanced diastolic dysfunction.  The right ventricle is normal size.  Global right ventricular function is moderately reduced.  No significant valvular  abnormalities were noted.  IVC diameter and respiratory changes fall into an intermediate range  suggesting an RA pressure of 8 mmHg.  Mild pulmonary hypertension is present.     This study was compared with the study from 3/25/2019. RV function has  worsened, LV size and function appear similar.    Devices  ICD ([employer]- 4/2/2015 )

## 2020-07-10 NOTE — PLAN OF CARE
ICU End of Shift Summary. See flowsheets for vital signs and detailed assessment.    Changes this shift: No acute changes this shift. Patient remains on Nitroprusside gtt @ 0.25 and Heparin @950. Xa therapeutic, no changes made. No BM this shift. PA catheter not drawing, CARDS2 MD notified. MD assessed at bedside and repositioned catheter. Bedside RN attempted to draw oxyhemoglobin for 1600 LIO numbers but was unsuccessful. MD notified, tPA requested. Orders yet to be obtained. No 1600 LIO numbers calculated, MD aware.     Plan: tPA PA catheter, Continue to monitor per plan of care, wean Nipride gtt as tolerated. Dental following for tooth extractions needed before LVAD    Problem: Diabetes Comorbidity  Goal: Blood Glucose Level Within Desired Range  Outcome: No Change     Problem: Cardiac Output Decreased (Heart Failure)  Goal: Optimal Cardiac Output  Outcome: No Change     Problem: Fluid Imbalance (Heart Failure)  Goal: Fluid Balance  Outcome: Improving

## 2020-07-10 NOTE — PROGRESS NOTES
Clinical Nutrition Services- Brief Note    Reviewed nutrition risk factors due to LOS. Pt is tolerating a 1G NA diet, eating well per nursing documentation and patient report. No nutrition issues identified at this time. RD will continue to follow per nutrition protocol.  Linda Barron RD, MS, LD  SICU: [phone]

## 2020-07-10 NOTE — PLAN OF CARE
Major Shift Events: Uneventful. CVP 10, PA 55/30, SvO2 68, CO/CI 5.1/2.7, . Remains on low dose Nipride to maintain MAP < 70, however CO > 2.   Plan: Re-check Xa @ 11:15, monitor hemodynamics, notify team with changes, continue LVAD/transplant w/up.     For vital signs and complete assessments, please see documentation flowsheets.      Анна Bains RN on 7/10/2020 at 5:22 AM

## 2020-07-11 ENCOUNTER — APPOINTMENT (OUTPATIENT)
Dept: GENERAL RADIOLOGY | Facility: CLINIC | Age: 65
DRG: 001 | End: 2020-07-11
Attending: INTERNAL MEDICINE
Payer: COMMERCIAL

## 2020-07-11 PROBLEM — K02.9 DENTAL CARIES: Status: ACTIVE | Noted: 2020-07-03

## 2020-07-11 LAB
ANION GAP SERPL CALCULATED.3IONS-SCNC: 4 MMOL/L (ref 3–14)
ANION GAP SERPL CALCULATED.3IONS-SCNC: 6 MMOL/L (ref 3–14)
BASE DEFICIT BLDV-SCNC: 0.5 MMOL/L
BASE EXCESS BLDV CALC-SCNC: 0.2 MMOL/L
BASE EXCESS BLDV CALC-SCNC: 0.7 MMOL/L
BUN SERPL-MCNC: 29 MG/DL (ref 7–30)
BUN SERPL-MCNC: 31 MG/DL (ref 7–30)
CALCIUM SERPL-MCNC: 8.8 MG/DL (ref 8.5–10.1)
CALCIUM SERPL-MCNC: 8.8 MG/DL (ref 8.5–10.1)
CHLORIDE SERPL-SCNC: 105 MMOL/L (ref 94–109)
CHLORIDE SERPL-SCNC: 107 MMOL/L (ref 94–109)
CO2 SERPL-SCNC: 24 MMOL/L (ref 20–32)
CO2 SERPL-SCNC: 26 MMOL/L (ref 20–32)
CREAT SERPL-MCNC: 1.63 MG/DL (ref 0.66–1.25)
CREAT SERPL-MCNC: 1.69 MG/DL (ref 0.66–1.25)
ERYTHROCYTE [DISTWIDTH] IN BLOOD BY AUTOMATED COUNT: 15.6 % (ref 10–15)
ERYTHROCYTE [DISTWIDTH] IN BLOOD BY AUTOMATED COUNT: NORMAL % (ref 10–15)
GFR SERPL CREATININE-BSD FRML MDRD: 41 ML/MIN/{1.73_M2}
GFR SERPL CREATININE-BSD FRML MDRD: 43 ML/MIN/{1.73_M2}
GLUCOSE BLDC GLUCOMTR-MCNC: 124 MG/DL (ref 70–99)
GLUCOSE BLDC GLUCOMTR-MCNC: 178 MG/DL (ref 70–99)
GLUCOSE BLDC GLUCOMTR-MCNC: 179 MG/DL (ref 70–99)
GLUCOSE BLDC GLUCOMTR-MCNC: 241 MG/DL (ref 70–99)
GLUCOSE SERPL-MCNC: 126 MG/DL (ref 70–99)
GLUCOSE SERPL-MCNC: 194 MG/DL (ref 70–99)
HCO3 BLDV-SCNC: 24 MMOL/L (ref 21–28)
HCO3 BLDV-SCNC: 26 MMOL/L (ref 21–28)
HCO3 BLDV-SCNC: 26 MMOL/L (ref 21–28)
HCT VFR BLD AUTO: 35.8 % (ref 40–53)
HCT VFR BLD AUTO: NORMAL % (ref 40–53)
HGB BLD-MCNC: 11.4 G/DL (ref 13.3–17.7)
HGB BLD-MCNC: NORMAL G/DL (ref 13.3–17.7)
LMWH PPP CHRO-ACNC: 0.21 IU/ML
MAGNESIUM SERPL-MCNC: 2 MG/DL (ref 1.6–2.3)
MAGNESIUM SERPL-MCNC: 2.1 MG/DL (ref 1.6–2.3)
MCH RBC QN AUTO: 30.9 PG (ref 26.5–33)
MCH RBC QN AUTO: NORMAL PG (ref 26.5–33)
MCHC RBC AUTO-ENTMCNC: 31.8 G/DL (ref 31.5–36.5)
MCHC RBC AUTO-ENTMCNC: NORMAL G/DL (ref 31.5–36.5)
MCV RBC AUTO: 97 FL (ref 78–100)
MCV RBC AUTO: NORMAL FL (ref 78–100)
O2/TOTAL GAS SETTING VFR VENT: 21 %
OXYHGB MFR BLDV: 52 %
OXYHGB MFR BLDV: 56 %
OXYHGB MFR BLDV: 60 %
PCO2 BLDV: 39 MM HG (ref 40–50)
PCO2 BLDV: 42 MM HG (ref 40–50)
PCO2 BLDV: 44 MM HG (ref 40–50)
PH BLDV: 7.37 PH (ref 7.32–7.43)
PH BLDV: 7.4 PH (ref 7.32–7.43)
PH BLDV: 7.4 PH (ref 7.32–7.43)
PLATELET # BLD AUTO: 189 10E9/L (ref 150–450)
PLATELET # BLD AUTO: NORMAL 10E9/L (ref 150–450)
PO2 BLDV: 29 MM HG (ref 25–47)
PO2 BLDV: 32 MM HG (ref 25–47)
PO2 BLDV: 33 MM HG (ref 25–47)
POTASSIUM SERPL-SCNC: 4 MMOL/L (ref 3.4–5.3)
POTASSIUM SERPL-SCNC: 4.2 MMOL/L (ref 3.4–5.3)
RBC # BLD AUTO: 3.69 10E12/L (ref 4.4–5.9)
RBC # BLD AUTO: NORMAL 10E12/L (ref 4.4–5.9)
SODIUM SERPL-SCNC: 135 MMOL/L (ref 133–144)
SODIUM SERPL-SCNC: 137 MMOL/L (ref 133–144)
WBC # BLD AUTO: 7.1 10E9/L (ref 4–11)
WBC # BLD AUTO: NORMAL 10E9/L (ref 4–11)

## 2020-07-11 PROCEDURE — 40000048 ZZH STATISTIC DAILY SWAN MONITORING

## 2020-07-11 PROCEDURE — 25000132 ZZH RX MED GY IP 250 OP 250 PS 637: Performed by: STUDENT IN AN ORGANIZED HEALTH CARE EDUCATION/TRAINING PROGRAM

## 2020-07-11 PROCEDURE — 00000146 ZZHCL STATISTIC GLUCOSE BY METER IP

## 2020-07-11 PROCEDURE — 25000132 ZZH RX MED GY IP 250 OP 250 PS 637: Performed by: SURGERY

## 2020-07-11 PROCEDURE — 25000125 ZZHC RX 250: Performed by: STUDENT IN AN ORGANIZED HEALTH CARE EDUCATION/TRAINING PROGRAM

## 2020-07-11 PROCEDURE — 25800030 ZZH RX IP 258 OP 636: Performed by: STUDENT IN AN ORGANIZED HEALTH CARE EDUCATION/TRAINING PROGRAM

## 2020-07-11 PROCEDURE — 82805 BLOOD GASES W/O2 SATURATION: CPT | Performed by: INTERNAL MEDICINE

## 2020-07-11 PROCEDURE — 80048 BASIC METABOLIC PNL TOTAL CA: CPT | Performed by: INTERNAL MEDICINE

## 2020-07-11 PROCEDURE — 27211417 ZZ H KIT, VPS RHYTHM STYLET

## 2020-07-11 PROCEDURE — 85027 COMPLETE CBC AUTOMATED: CPT | Performed by: STUDENT IN AN ORGANIZED HEALTH CARE EDUCATION/TRAINING PROGRAM

## 2020-07-11 PROCEDURE — 36569 INSJ PICC 5 YR+ W/O IMAGING: CPT

## 2020-07-11 PROCEDURE — 40000196 ZZH STATISTIC RAPCV CVP MONITORING

## 2020-07-11 PROCEDURE — 83735 ASSAY OF MAGNESIUM: CPT | Performed by: INTERNAL MEDICINE

## 2020-07-11 PROCEDURE — 25000125 ZZHC RX 250: Performed by: INTERNAL MEDICINE

## 2020-07-11 PROCEDURE — 20000004 ZZH R&B ICU UMMC

## 2020-07-11 PROCEDURE — 25000128 H RX IP 250 OP 636

## 2020-07-11 PROCEDURE — 85027 COMPLETE CBC AUTOMATED: CPT | Performed by: INTERNAL MEDICINE

## 2020-07-11 PROCEDURE — 99233 SBSQ HOSP IP/OBS HIGH 50: CPT | Mod: 25 | Performed by: INTERNAL MEDICINE

## 2020-07-11 PROCEDURE — 25000128 H RX IP 250 OP 636: Performed by: INTERNAL MEDICINE

## 2020-07-11 PROCEDURE — 85520 HEPARIN ASSAY: CPT | Performed by: INTERNAL MEDICINE

## 2020-07-11 PROCEDURE — 40000986 XR CHEST PORT 1 VW

## 2020-07-11 PROCEDURE — 40000275 ZZH STATISTIC RCP TIME EA 10 MIN

## 2020-07-11 PROCEDURE — 27211389 ZZ H KIT, 5 FR DL BIOFLO OPEN ENDED PICC

## 2020-07-11 PROCEDURE — 25000128 H RX IP 250 OP 636: Performed by: STUDENT IN AN ORGANIZED HEALTH CARE EDUCATION/TRAINING PROGRAM

## 2020-07-11 RX ORDER — LIDOCAINE 40 MG/G
CREAM TOPICAL
Status: ACTIVE | OUTPATIENT
Start: 2020-07-11 | End: 2020-07-14

## 2020-07-11 RX ORDER — HEPARIN SODIUM,PORCINE 10 UNIT/ML
2-5 VIAL (ML) INTRAVENOUS
Status: ACTIVE | OUTPATIENT
Start: 2020-07-11 | End: 2020-07-14

## 2020-07-11 RX ORDER — HEPARIN SODIUM,PORCINE 10 UNIT/ML
5-10 VIAL (ML) INTRAVENOUS
Status: DISCONTINUED | OUTPATIENT
Start: 2020-07-11 | End: 2020-07-18

## 2020-07-11 RX ORDER — FUROSEMIDE 10 MG/ML
40 INJECTION INTRAMUSCULAR; INTRAVENOUS DAILY
Status: DISCONTINUED | OUTPATIENT
Start: 2020-07-11 | End: 2020-07-11

## 2020-07-11 RX ORDER — HEPARIN SODIUM,PORCINE 10 UNIT/ML
5-10 VIAL (ML) INTRAVENOUS EVERY 24 HOURS
Status: DISCONTINUED | OUTPATIENT
Start: 2020-07-11 | End: 2020-07-18

## 2020-07-11 RX ORDER — FUROSEMIDE 10 MG/ML
40 INJECTION INTRAMUSCULAR; INTRAVENOUS
Status: DISCONTINUED | OUTPATIENT
Start: 2020-07-11 | End: 2020-07-13

## 2020-07-11 RX ADMIN — ATORVASTATIN CALCIUM 40 MG: 40 TABLET, FILM COATED ORAL at 08:15

## 2020-07-11 RX ADMIN — DOCUSATE SODIUM AND SENNOSIDES 2 TABLET: 8.6; 5 TABLET ORAL at 08:16

## 2020-07-11 RX ADMIN — HYDRALAZINE HYDROCHLORIDE 75 MG: 25 TABLET, FILM COATED ORAL at 14:56

## 2020-07-11 RX ADMIN — LOSARTAN POTASSIUM 50 MG: 50 TABLET, FILM COATED ORAL at 08:15

## 2020-07-11 RX ADMIN — ISOSORBIDE MONONITRATE 60 MG: 60 TABLET, EXTENDED RELEASE ORAL at 08:15

## 2020-07-11 RX ADMIN — AMIODARONE HYDROCHLORIDE 400 MG: 200 TABLET ORAL at 08:15

## 2020-07-11 RX ADMIN — HYDRALAZINE HYDROCHLORIDE 75 MG: 25 TABLET, FILM COATED ORAL at 09:18

## 2020-07-11 RX ADMIN — FUROSEMIDE 40 MG: 10 INJECTION, SOLUTION INTRAMUSCULAR; INTRAVENOUS at 17:24

## 2020-07-11 RX ADMIN — ALTEPLASE 2 MG: 2.2 INJECTION, POWDER, LYOPHILIZED, FOR SOLUTION INTRAVENOUS at 10:01

## 2020-07-11 RX ADMIN — HEPARIN SODIUM 950 UNITS/HR: 10000 INJECTION, SOLUTION INTRAVENOUS at 10:06

## 2020-07-11 RX ADMIN — SODIUM THIOSULFATE 0.25 MCG/KG/MIN: 250 INJECTION, SOLUTION INTRAVENOUS at 03:21

## 2020-07-11 RX ADMIN — FUROSEMIDE 40 MG: 10 INJECTION, SOLUTION INTRAVENOUS at 08:11

## 2020-07-11 RX ADMIN — HYDRALAZINE HYDROCHLORIDE 75 MG: 25 TABLET, FILM COATED ORAL at 19:33

## 2020-07-11 RX ADMIN — LIDOCAINE HYDROCHLORIDE 1 ML: 10 INJECTION, SOLUTION EPIDURAL; INFILTRATION; INTRACAUDAL; PERINEURAL at 15:49

## 2020-07-11 RX ADMIN — AMIODARONE HYDROCHLORIDE 400 MG: 200 TABLET ORAL at 19:33

## 2020-07-11 RX ADMIN — POTASSIUM CHLORIDE 20 MEQ: 750 TABLET, EXTENDED RELEASE ORAL at 08:15

## 2020-07-11 RX ADMIN — ASPIRIN 81 MG CHEWABLE TABLET 81 MG: 81 TABLET CHEWABLE at 08:16

## 2020-07-11 ASSESSMENT — ACTIVITIES OF DAILY LIVING (ADL)
ADLS_ACUITY_SCORE: 15
ADLS_ACUITY_SCORE: 14
ADLS_ACUITY_SCORE: 14
ADLS_ACUITY_SCORE: 15
ADLS_ACUITY_SCORE: 14
ADLS_ACUITY_SCORE: 15

## 2020-07-11 ASSESSMENT — MIFFLIN-ST. JEOR: SCORE: 1484.34

## 2020-07-11 NOTE — PROGRESS NOTES
[facility]   Cardiology II Service / Advanced Heart Failure  Progress note    Lucian Morillo  : 1955  MRN # [id number]    ADMIT DATE: 7/3/2020    Changes today  - wean off nipirde gtt   - continue PTA imdur and cozzar   - increase hydralazine dose to 75 mg TID    - dental to schedule tooth extractions (oral surgery planning to do in OR )  - PICC placement   - swan removal (will correlated CVP off PICC with swan prior to removal)     ASSESSMENT & PLAN:  Lucian Henderson SrMerly is a 64 year old male with a PMH of HTN, DMII, obesity, CKD, CHANTEL, ischemic cardiomyopathy and severe LV systolic dysfunction s/p 3V CABG () and primary prevention ICD ( 4/2/15). Currently he presents with 3 days of worsening fatigue and SOB with minimal exertion. Patient claims he could walk 1-2 blocks last week but has been barely able to ambulate home recently.     Admitted for possible LVAD/Transplant work up.    Date RA MPAP PCWP SV02 Jacy CO Jacy CI MAP SVR PVR Intervention   20 8 50 38 49 2.3 1.28 89 2400 5.2    20  6 48 28 35 4.0 2.1 69 1300 2.2 Nipride, PO afterload reduction    7/10 9 56/34 32 68 5.1 2.7 68 900 1.8 Nipride 1, PO afterload reduction    14 65/42/50 36 60 4.3 2.2 89 1100 1.8 Nipride 0.25, PO afterload reduction        #Atrial Flutter with RVR  Patient went into Atrial flutter with RVR with rates around 150 overnight on 2020 at around 2 am. 5 mg IV beta-blocker was administered but this significantly lowered the patient's blood pressure.  - 5 mg IV beta-blocker administered and heparin started on 20 over night when patient went into flutter.    - Stopped Lasix, 250 ml LR Bolus (20)  - IV Amiodarone bolus 150 , followed by 1mg/min infusion for 6h followed by 0.5mg/min, started amiodarone 400 mg PO BID  (will do BID dosing for ~ 5 days,  can go to daily dosing)   - Digoxin  mcg (20)    # ICM s/p 3V CABG () and primary prevention  "ICD ( 4/2/15)  #HFrEF Stage D, NYHA III-IV  # Ambulatory cardiogenic shock  # Arrhythmia: HF associated VT/VF  Currently he presents with 3 days of worsening fatigue and SOB with minimal exertion, cardiac cachexia and cold extremities. Admitted for possible LVAD/Transplant work up. - Echocardiogram showed an EF of 15% on 7/5/20  - Diuresis : lasix 40 mg PO BID   - ASA 81 mg daily  - Atorvastatin 40 daily  - Nipirde gtt started 7/8  -Hydralazine 50 TID   - IMDUR 60 mg starting on 7/9/20  - Losartan 50 mg starting on 7/9/20  -Started LVAD/ Transplant work up with consults for Neuro/Psyc, CVTS, social, dental, palliative   - LVAD show and tell to be done during admission, LVAD coordinator on call paged  - He has had a colonoscopy on 8/7/19   - Hemodynamics as above     # DMII ( A1c: 8.3)   - Finger sticks  - Hypoglycemia protocol   - Moderate intensity sliding scale insulin       #CKD  - Monitor Scr and K Q12h   - Avoid nephrotoxic agents    Floor Care  Fluids: 1.5L fluid restriction  Food: 2 gram sodium diet, low fat diet  Electrolyte repletion: potassium/magnesium sliding scale  DVT ppx: Lovenox  Glycemic Control: sliding scale insulin   Lines: PIV x1  Consults: none  Code Status: full    Discharge plan: inpatient admission, anticipate discharge TBD    Patient was discussed with attending physician, Dr. Anand.     Héctor Smith MD   Cardiology Fellow, PGY-5  [phone]     ..........................................................................................................................................................  Subjective:  SARITA overnight. Updated patient and wife with plan using . No CP, SOB, nausea, vomiting.     PHYSICAL EXAM:  /65   Pulse 96   Temp 98.5  F (36.9  C) (Oral)   Resp 19   Ht 1.626 m (5' 4\")   Wt 79.7 kg (175 lb 11.3 oz)   SpO2 92%   BMI 30.16 kg/m    GENERAL: NAD  NECK: Supple and without lymphadenopathy. JVP 12-14cm  CV: S1/S2 heard without murmur "   RESPIRATORY: CTAB  GI: Soft and distended. No tenderness, rebound, guarding. No palpable organomegaly.   EXTREMITIES: Peripheral edema trace.   NEUROLOGIC: Alert and orientated x 3.   MUSCULOSKELETAL: No joint swelling or tenderness.   SKIN: No jaundice. No acute rashes or lesions.     ROS:   12-pt ROS otherwise negative except as noted above    PMH:  Past Medical History:   Diagnosis Date     CAD (coronary artery disease)      CHF (congestive heart failure) (H)      CKD (chronic kidney disease), stage III (H)      Cortical cataract of both eyes      Diabetes (H)      Hyperlipidemia      Hypertension      Ischemic cardiomyopathy      Obesity      CHANTEL (obstructive sleep apnea)     occas cpap     Osteoarthritis        PSH:  Past Surgical History:   Procedure Laterality Date     C CABG, ARTERY-VEIN, THREE  02/2008     CATARACT IOL, RT/LT       COLONOSCOPY N/A 8/7/2019    Procedure: COLONOSCOPY, WITH POLYPECTOMY AND BIOPSY;  Surgeon: Chauncey oMrataya MD;  Location:  GI     CV RIGHT HEART CATH N/A 3/25/2019    Procedure: CV RIGHT HEART CATH;  Surgeon: Moises Santos MD;  Location:  HEART CARDIAC CATH LAB     CV RIGHT HEART CATH N/A 7/10/2019    Procedure: CV RIGHT HEART CATH;  Surgeon: Jak Mccabe MD;  Location:  HEART CARDIAC CATH LAB     CV RIGHT HEART CATH N/A 7/8/2020    Procedure: Right Heart Cath with Leave In [place] already has ICU load;  Surgeon: Jak Mccabe MD;  [facility]: [facility]     IMPLANT AUTOMATIC IMPLANTABLE CARDIOVERTER DEFIBRILLATOR       PHACOEMULSIFICATION CLEAR CORNEA WITH STANDARD IOL, VITRECTOMY PARSPLANA 25 GAGUE, COMBINED Left 12/10/2019    Procedure: PHACOEMULSIFICATION, CATARACT, CLEAR CORNEAL INCISION APPROACH, W STD INTRAOCULAR LENS IMPLANT INSERT + VITRECTOMY BY PARS PLANA  USING 25-GAUGE INSTRUMENTS. ENDOLASER, INFUSION OF 20% SF6 GAS;  Surgeon: Triny Bunch MD;  Location: [facility]       MEDICATIONS:  Prior to Admission Medications    Prescriptions Last Dose Informant Patient Reported? Taking?   aspirin 81 MG tablet  Self No No   Sig: Take 1 tablet (81 mg) by mouth daily   atorvastatin (LIPITOR) 40 MG tablet  Self No No   Sig: Take 1 tablet (40 mg) by mouth daily   blood glucose (ACCU-CHEK SMARTVIEW) test strip  Self No No   Sig: USE TO TEST THREE TIMES DAILY AS DIRECTED   blood glucose (NO BRAND SPECIFIED) test strip  Self No No   Sig: Use to test blood sugar 2 times daily or as directed.   blood glucose monitoring (ACCU-CHEK FELIPE SMARTVIEW) meter device kit  Self No No   Sig: Use to test blood sugar 3-4 times daily, as directed.   blood glucose monitoring (ONE TOUCH DELICA) lancets  Self No No   Sig: Use to test blood sugars 2 times daily.   clopidogrel (PLAVIX) 75 MG tablet  Self No No   Sig: Take 1 tablet (75 mg) by mouth daily   exenatide ER (BYDUREON) 2 MG pen  Self No No   Sig: Inject 2 mg Subcutaneous every 7 days   furosemide (LASIX) 20 MG tablet  Self No No   Sig: Take 2 tablets (40 mg) by mouth 2 times daily   glimepiride (AMARYL) 4 MG tablet  Self No No   Sig: Take 1 tablet (4 mg) by mouth every morning (before breakfast)   hydrALAZINE (APRESOLINE) 25 MG tablet  Self No No   Sig: Take 1 tablet (25 mg) by mouth 3 times daily   insulin glargine (LANTUS SOLOSTAR) 100 UNIT/ML pen  Self No No   Sig: Take 8 units in the morning and 40 units in the evening   insulin pen needle (B-D U/F) 31G X 5 MM miscellaneous  Self No No   Si Units by Device route 2 times daily Use 2 pen needles daily or as directed.   isosorbide mononitrate (IMDUR) 60 MG 24 hr tablet  Self No No   Sig: Take 1 tablet (60 mg) by mouth daily   losartan (COZAAR) 50 MG tablet  Spouse/Significant Other No No   Sig: Take 1 tablet (50 mg) by mouth daily   nitroglycerin (NITROSTAT) 0.4 MG SL tablet  Self No No   Sig: Place 1 tablet (0.4 mg) under the tongue every 5 minutes as needed for chest pain If you are still having symptoms after 3 doses (15 minutes) call 911.   order  for DME  Self No No   Sig: Auto CPAP 8-15 cmH20      Facility-Administered Medications Last Administration Doses Remaining   erythromycin (ROMYCIN) ophthalmic ointment None recorded    lidocaine (PF) (XYLOCAINE) 2 % injection 10 mL None recorded 1           ALLERGIES:     Allergies   Allergen Reactions     No Known Drug Allergies        FAMILY HISTORY:  Family History   Problem Relation Age of Onset     Diabetes Brother      Diabetes Sister      Diabetes Sister      Macular Degeneration No family hx of      Glaucoma No family hx of      Myocardial Infarction No family hx of      Kidney Disease No family hx of        SOCIAL HISTORY:  Social History     Socioeconomic History     Marital status:      Spouse name: Not on file     Number of children: 4     Years of education: Not on file     Highest education level: Not on file   Occupational History     Occupation:      Employer: [employer]     Employer: RETIRED   Social Needs     Financial resource strain: Not on file     Food insecurity     Worry: Not on file     Inability: Not on file     Transportation needs     Medical: Not on file     Non-medical: Not on file   Tobacco Use     Smoking status: Never Smoker     Smokeless tobacco: Never Used     Tobacco comment: Never smoked; non-smoking household   Substance and Sexual Activity     Alcohol use: No     Alcohol/week: 0.0 standard drinks     Drug use: No     Sexual activity: Yes     Partners: Female   Lifestyle     Physical activity     Days per week: Not on file     Minutes per session: Not on file     Stress: Not on file   Relationships     Social connections     Talks on phone: Not on file     Gets together: Not on file     Attends [employer] service: Not on file     Active member of club or organization: Not on file     Attends meetings of clubs or organizations: Not on file     Relationship status: Not on file     Intimate partner violence     Fear of current or ex partner: Not on file      Emotionally abused: Not on file     Physically abused: Not on file     Forced sexual activity: Not on file   Other Topics Concern     Parent/sibling w/ CABG, MI or angioplasty before 65F 55M? No   Social History Narrative     Not on file       LABS:  BMP  Recent Labs   Lab 07/11/20  0331 07/10/20  1607 07/10/20  0332 07/09/20  1621    136 138 134   POTASSIUM 4.0 4.3 4.2 4.0   CHLORIDE 107 109 108 104   CO2 26 24 26 24   BUN 29 30 33* 37*   CR 1.63* 1.55* 1.61* 1.61*   * 177* 144* 222*   BRYANNA 8.8 8.1* 8.5 8.3*     CBC  Recent Labs   Lab 07/11/20  0458 07/11/20  0331 07/10/20  0332 07/09/20  0441   WBC 7.1 Canceled, Test credited 7.4 7.6   HGB 11.4* Canceled, Test credited 11.4* 12.0*   HCT 35.8* Canceled, Test credited 36.2* 37.5*   MCV 97 Canceled, Test credited 97 96    Canceled, Test credited 185 209     INR  No lab results found in last 7 days.  IMAGING:    RHC 6/18/20     RA 20/26/18  RV 85/28  PA 82/45/58  PCWP 45  Cardiac output by Jacy: 3.85 L/min (indexed to 2.09 L/min/m2)  Cardiac output by thermodilution: 3.63 L/min (indexed to 1.97 L/min/m2)  SVR (by TD CO): 1123  PVR (by TD CO): 7.4    Angiogram 6/18/20   3 vessel CAD s/p 3V CABG in 2008 (LIMA-LAD, SVG-OM1, SVG-RPDA)  2. Known proximal SVG-RPDA occlusion  3. Presumed occlusion of SVG-OM1 (unable to locate on non-selective aortic root shot)  4. Patent LIMA-LAD with collateralization of LAD to PDA    Echocardiogram ( 3/11/2019)   Moderate to severe left ventricular dilation is present.  Severely (EF 10-20%) reduced left ventricular function is present.  No significant valve dysfunction.  Compared to study done 6/5/17 there is no significant change in LV size and  systolic function    Echocardiogram ( 7/5/2020)   Interpretation Summary  Severely (EF 10-20%) reduced left ventricular function is present. LVEF 15%  based on biplane 2D tracing. Severe left ventricular dilation is present.  LVIDd:6.8 cm. Grade III or advanced diastolic  dysfunction.  The right ventricle is normal size.  Global right ventricular function is moderately reduced.  No significant valvular abnormalities were noted.  IVC diameter and respiratory changes fall into an intermediate range  suggesting an RA pressure of 8 mmHg.  Mild pulmonary hypertension is present.     This study was compared with the study from 3/25/2019. RV function has  worsened, LV size and function appear similar.    Devices  ICD ([employer]- 4/2/2015 )

## 2020-07-11 NOTE — PROVIDER NOTIFICATION
Cards 2 called for order clarification as to whether or not Nitroprusside gtt was to remain at straight rate of 0.25 mcg/kg/min or to be titrated for MAP goal. Also followed up with Cards 2 on pt's PA catheter not having blood return.

## 2020-07-11 NOTE — PLAN OF CARE
ICU End of Shift Summary. See flowsheets for vital signs and detailed assessment.    Changes this shift: Pt's PA line had no blood return, CXR completed. Cards 2 came to bedside to pull back PA catheter. Pulled to 53. Repeat CXR completed. SVR increasing - Cards 2 aware. Nitroprusside order updated to titrate for MAP 65-75 and hydralazine dose increased. Morning SVR decreasing. No BM. Senna 2 tabs given. Voiding using urinal.    Plan:  Titrate Nitroprusside per MAP goal, FICKS q6      Problem: Adult Inpatient Plan of Care  Goal: Plan of Care Review  7/11/2020 0611 by Favreau-Garsia, Gabriella, RN  Outcome: No Change  7/10/2020 1844 by Gustavo Stephens RN  Outcome: Improving  Goal: Patient-Specific Goal (Individualization)  7/11/2020 0611 by Favreau-Garsia, Gabriella, RN  Outcome: No Change  7/10/2020 1844 by Gustavo Stephens RN  Outcome: Improving  Goal: Absence of Hospital-Acquired Illness or Injury  7/11/2020 0611 by Favreau-Garsia, Gabriella, RN  Outcome: No Change  7/10/2020 1844 by Gustavo Stephens RN  Outcome: Improving  Goal: Optimal Comfort and Wellbeing  7/11/2020 0611 by Favreau-Garsia, Gabriella, RN  Outcome: No Change  7/10/2020 1844 by Gustavo Stephens RN  Outcome: Improving  Goal: Readiness for Transition of Care  7/11/2020 0611 by Favreau-Garsia, Gabriella, RN  Outcome: No Change  7/10/2020 1844 by Gustavo Stephens RN  Outcome: Improving  Goal: Rounds/Family Conference  7/11/2020 0611 by Favreau-Garsia, Gabriella, RN  Outcome: No Change  7/10/2020 1844 by Gustavo Stephens RN  Outcome: Improving     Problem: Adjustment to Illness (Heart Failure)  Goal: Optimal Coping  7/11/2020 0611 by Favreau-Garsia, Gabriella, RN  Outcome: No Change  7/10/2020 1844 by Gustavo Stephens RN  Outcome: Improving     Problem: Cardiac Output Decreased (Heart Failure)  Goal: Optimal Cardiac Output  7/11/2020 0611 by Favreau-Garsia, Gabriella, RN  Outcome: No Change  7/10/2020 1844 by Gustavo Stephens, [facility]  Outcome: No Change     Problem:  Fluid Imbalance (Heart Failure)  Goal: Fluid Balance  7/11/2020 0611 by Favreau-Garsia, Gabriella, RN  Outcome: No Change  7/10/2020 1844 by Gustavo Stephens RN  Outcome: Improving     Problem: Functional Ability Impaired (Heart Failure)  Goal: Optimal Functional Ability  7/11/2020 0611 by Favreau-Garsia, Gabriella, RN  Outcome: No Change  7/10/2020 1844 by Gustavo Stephens RN  Outcome: Improving     Problem: Sleep Disordered Breathing (Heart Failure)  Goal: Effective Breathing Pattern During Sleep  7/11/2020 0611 by Favreau-Garsia, Gabriella, RN  Outcome: No Change  7/10/2020 1844 by Gustavo Stephens RN  Outcome: No Change     Problem: Diabetes Comorbidity  Goal: Blood Glucose Level Within Desired Range  7/11/2020 0611 by Favreau-Garsia, Gabriella, RN  Outcome: No Change  7/10/2020 1844 by Gustavo Stephens RN  Outcome: No Change

## 2020-07-11 NOTE — PROCEDURES
[facility], [place]    Double Lumen PICC Placement    Date/Time: 7/11/2020 4:36 PM  Performed by: Kacy Bradford RN  Authorized by: Zayra Chowdhury MD     UNIVERSAL PROTOCOL   Site Marked: Yes  Prior Images Obtained and Reviewed:  Yes  Required items: Required blood products, implants, devices and special equipment available    Patient identity confirmed:  Verbally with patient and arm band  NA - No sedation, light sedation, or local anesthesia  Confirmation Checklist:  Patient's identity using two indicators, relevant allergies, procedure was appropriate and matched the consent or emergent situation and correct equipment/implants were available  Time out: Immediately prior to the procedure a time out was called    Universal Protocol: the Joint Commission Universal Protocol was followed    Preparation: Patient was prepped and draped in usual sterile fashion           ANESTHESIA    Local Anesthetic: Lidocaine 1% without epinephrine  Anesthetic Total (mL):  1      SEDATION    Patient Sedated: No        Preparation: skin prepped with ChloraPrep  Skin prep agent: skin prep agent completely dried prior to procedure  Sterile barriers: maximum sterile barriers were used: cap, mask, sterile gown, sterile gloves, and large sterile sheet  Hand hygiene: hand hygiene performed prior to central venous catheter insertion  Type of line used: PICC  Catheter type: double lumen  Lumen type: non-valved  Catheter size: 5 Fr  : Bioflo DL open ended.  Lot number: [id number]  Placement method: venipuncture, MST, ultrasound and tip confirmation system  Number of attempts: 1  Successful placement: yes  Orientation: right  Location: basilic vein (Vein diameter 0.68)  Arm circumference: adults 15 cm  Extremity circumference: 31  Visible catheter length: 1  Internal length: 43 cm  Total catheter length: 44  Dressing and securement: blood removed, chlorhexidine disc applied, dressing applied, line secured,  occlusive dressing applied, securement device, site cleaned, statlock and sterile dressing applied  Post procedure assessment: blood return through all ports and placement verified by x-ray  PROCEDURE   Patient Tolerance:  Patient tolerated the procedure well with no immediate complications

## 2020-07-11 NOTE — PLAN OF CARE
ICU End of Shift Summary. See flowsheets for vital signs and detailed assessment.    Changes this shift: VSS. Pt A&Ox4. SBA. Nipirde gtt off since 0900. TPA needed for CVC line and was successful. PICC was placed so that patient can have a SWAN holiday. Will get CVP set up on PICC after it is approved for use.       Plan: NPO at midnight for tooth/teeth extraction.

## 2020-07-12 ENCOUNTER — ANESTHESIA EVENT (OUTPATIENT)
Dept: SURGERY | Facility: CLINIC | Age: 65
DRG: 001 | End: 2020-07-12
Payer: COMMERCIAL

## 2020-07-12 ENCOUNTER — APPOINTMENT (OUTPATIENT)
Dept: GENERAL RADIOLOGY | Facility: CLINIC | Age: 65
DRG: 001 | End: 2020-07-12
Attending: STUDENT IN AN ORGANIZED HEALTH CARE EDUCATION/TRAINING PROGRAM
Payer: COMMERCIAL

## 2020-07-12 LAB
ANION GAP SERPL CALCULATED.3IONS-SCNC: 4 MMOL/L (ref 3–14)
ANION GAP SERPL CALCULATED.3IONS-SCNC: 8 MMOL/L (ref 3–14)
BASE DEFICIT BLDV-SCNC: 1.6 MMOL/L
BASE EXCESS BLDV CALC-SCNC: 0.9 MMOL/L
BUN SERPL-MCNC: 29 MG/DL (ref 7–30)
BUN SERPL-MCNC: 32 MG/DL (ref 7–30)
CALCIUM SERPL-MCNC: 8.3 MG/DL (ref 8.5–10.1)
CALCIUM SERPL-MCNC: 9.2 MG/DL (ref 8.5–10.1)
CHLORIDE SERPL-SCNC: 105 MMOL/L (ref 94–109)
CHLORIDE SERPL-SCNC: 106 MMOL/L (ref 94–109)
CO2 SERPL-SCNC: 25 MMOL/L (ref 20–32)
CO2 SERPL-SCNC: 26 MMOL/L (ref 20–32)
CREAT SERPL-MCNC: 1.67 MG/DL (ref 0.66–1.25)
CREAT SERPL-MCNC: 1.77 MG/DL (ref 0.66–1.25)
ERYTHROCYTE [DISTWIDTH] IN BLOOD BY AUTOMATED COUNT: 15.9 % (ref 10–15)
GFR SERPL CREATININE-BSD FRML MDRD: 39 ML/MIN/{1.73_M2}
GFR SERPL CREATININE-BSD FRML MDRD: 42 ML/MIN/{1.73_M2}
GLUCOSE BLDC GLUCOMTR-MCNC: 122 MG/DL (ref 70–99)
GLUCOSE BLDC GLUCOMTR-MCNC: 151 MG/DL (ref 70–99)
GLUCOSE BLDC GLUCOMTR-MCNC: 172 MG/DL (ref 70–99)
GLUCOSE BLDC GLUCOMTR-MCNC: 324 MG/DL (ref 70–99)
GLUCOSE BLDC GLUCOMTR-MCNC: 335 MG/DL (ref 70–99)
GLUCOSE SERPL-MCNC: 113 MG/DL (ref 70–99)
GLUCOSE SERPL-MCNC: 163 MG/DL (ref 70–99)
HCO3 BLDV-SCNC: 23 MMOL/L (ref 21–28)
HCO3 BLDV-SCNC: 25 MMOL/L (ref 21–28)
HCT VFR BLD AUTO: 38 % (ref 40–53)
HGB BLD-MCNC: 12.2 G/DL (ref 13.3–17.7)
LMWH PPP CHRO-ACNC: 0.25 IU/ML
MAGNESIUM SERPL-MCNC: 1.9 MG/DL (ref 1.6–2.3)
MAGNESIUM SERPL-MCNC: 2.1 MG/DL (ref 1.6–2.3)
MCH RBC QN AUTO: 31.1 PG (ref 26.5–33)
MCHC RBC AUTO-ENTMCNC: 32.1 G/DL (ref 31.5–36.5)
MCV RBC AUTO: 97 FL (ref 78–100)
O2/TOTAL GAS SETTING VFR VENT: 21 %
O2/TOTAL GAS SETTING VFR VENT: 21 %
OXYHGB MFR BLDV: 66 %
OXYHGB MFR BLDV: 68 %
PCO2 BLDV: 36 MM HG (ref 40–50)
PCO2 BLDV: 39 MM HG (ref 40–50)
PH BLDV: 7.41 PH (ref 7.32–7.43)
PH BLDV: 7.42 PH (ref 7.32–7.43)
PLATELET # BLD AUTO: 219 10E9/L (ref 150–450)
PO2 BLDV: 36 MM HG (ref 25–47)
PO2 BLDV: 37 MM HG (ref 25–47)
POTASSIUM SERPL-SCNC: 3.7 MMOL/L (ref 3.4–5.3)
POTASSIUM SERPL-SCNC: 3.8 MMOL/L (ref 3.4–5.3)
RBC # BLD AUTO: 3.92 10E12/L (ref 4.4–5.9)
SODIUM SERPL-SCNC: 136 MMOL/L (ref 133–144)
SODIUM SERPL-SCNC: 138 MMOL/L (ref 133–144)
WBC # BLD AUTO: 7.4 10E9/L (ref 4–11)

## 2020-07-12 PROCEDURE — 80048 BASIC METABOLIC PNL TOTAL CA: CPT | Performed by: INTERNAL MEDICINE

## 2020-07-12 PROCEDURE — 25000132 ZZH RX MED GY IP 250 OP 250 PS 637: Performed by: SURGERY

## 2020-07-12 PROCEDURE — 25000128 H RX IP 250 OP 636: Performed by: SURGERY

## 2020-07-12 PROCEDURE — 85027 COMPLETE CBC AUTOMATED: CPT | Performed by: INTERNAL MEDICINE

## 2020-07-12 PROCEDURE — 25000132 ZZH RX MED GY IP 250 OP 250 PS 637: Performed by: STUDENT IN AN ORGANIZED HEALTH CARE EDUCATION/TRAINING PROGRAM

## 2020-07-12 PROCEDURE — 25000128 H RX IP 250 OP 636

## 2020-07-12 PROCEDURE — 00000146 ZZHCL STATISTIC GLUCOSE BY METER IP

## 2020-07-12 PROCEDURE — 82805 BLOOD GASES W/O2 SATURATION: CPT | Performed by: STUDENT IN AN ORGANIZED HEALTH CARE EDUCATION/TRAINING PROGRAM

## 2020-07-12 PROCEDURE — 12000001 ZZH R&B MED SURG/OB UMMC

## 2020-07-12 PROCEDURE — 83735 ASSAY OF MAGNESIUM: CPT | Performed by: INTERNAL MEDICINE

## 2020-07-12 PROCEDURE — 25000128 H RX IP 250 OP 636: Performed by: STUDENT IN AN ORGANIZED HEALTH CARE EDUCATION/TRAINING PROGRAM

## 2020-07-12 PROCEDURE — 40000196 ZZH STATISTIC RAPCV CVP MONITORING

## 2020-07-12 PROCEDURE — 71045 X-RAY EXAM CHEST 1 VIEW: CPT

## 2020-07-12 PROCEDURE — 85520 HEPARIN ASSAY: CPT | Performed by: INTERNAL MEDICINE

## 2020-07-12 PROCEDURE — 40000275 ZZH STATISTIC RCP TIME EA 10 MIN

## 2020-07-12 PROCEDURE — 99233 SBSQ HOSP IP/OBS HIGH 50: CPT | Mod: GC | Performed by: INTERNAL MEDICINE

## 2020-07-12 PROCEDURE — 25000128 H RX IP 250 OP 636: Performed by: INTERNAL MEDICINE

## 2020-07-12 RX ADMIN — ASPIRIN 81 MG CHEWABLE TABLET 81 MG: 81 TABLET CHEWABLE at 07:58

## 2020-07-12 RX ADMIN — POTASSIUM CHLORIDE 20 MEQ: 750 TABLET, EXTENDED RELEASE ORAL at 17:39

## 2020-07-12 RX ADMIN — AMIODARONE HYDROCHLORIDE 400 MG: 200 TABLET ORAL at 07:58

## 2020-07-12 RX ADMIN — MAGNESIUM SULFATE IN WATER 2 G: 40 INJECTION, SOLUTION INTRAVENOUS at 04:51

## 2020-07-12 RX ADMIN — HYDRALAZINE HYDROCHLORIDE 75 MG: 25 TABLET, FILM COATED ORAL at 19:47

## 2020-07-12 RX ADMIN — ISOSORBIDE MONONITRATE 60 MG: 60 TABLET, EXTENDED RELEASE ORAL at 07:58

## 2020-07-12 RX ADMIN — HYDRALAZINE HYDROCHLORIDE 75 MG: 25 TABLET, FILM COATED ORAL at 07:58

## 2020-07-12 RX ADMIN — DOCUSATE SODIUM AND SENNOSIDES 2 TABLET: 8.6; 5 TABLET ORAL at 10:37

## 2020-07-12 RX ADMIN — LOSARTAN POTASSIUM 50 MG: 50 TABLET, FILM COATED ORAL at 07:59

## 2020-07-12 RX ADMIN — POTASSIUM CHLORIDE 20 MEQ: 750 TABLET, EXTENDED RELEASE ORAL at 04:51

## 2020-07-12 RX ADMIN — ATORVASTATIN CALCIUM 40 MG: 40 TABLET, FILM COATED ORAL at 07:59

## 2020-07-12 RX ADMIN — HYDRALAZINE HYDROCHLORIDE 75 MG: 25 TABLET, FILM COATED ORAL at 14:20

## 2020-07-12 RX ADMIN — FUROSEMIDE 40 MG: 10 INJECTION, SOLUTION INTRAMUSCULAR; INTRAVENOUS at 17:39

## 2020-07-12 RX ADMIN — FUROSEMIDE 40 MG: 10 INJECTION, SOLUTION INTRAMUSCULAR; INTRAVENOUS at 07:58

## 2020-07-12 RX ADMIN — HEPARIN SODIUM 950 UNITS/HR: 10000 INJECTION, SOLUTION INTRAVENOUS at 11:31

## 2020-07-12 RX ADMIN — AMIODARONE HYDROCHLORIDE 400 MG: 200 TABLET ORAL at 19:47

## 2020-07-12 ASSESSMENT — ACTIVITIES OF DAILY LIVING (ADL)
ADLS_ACUITY_SCORE: 14

## 2020-07-12 ASSESSMENT — MIFFLIN-ST. JEOR: SCORE: 1465

## 2020-07-12 NOTE — ANESTHESIA PREPROCEDURE EVALUATION
Anesthesia Pre-Procedure Evaluation    Patient: Lucian Henderson .   MRN:     [id number] Gender:   male   Age:    64 year old :      1955        Preoperative Diagnosis: Dental caries [K02.9]   Procedure(s):  EXTRACTION, TOOTH #11, 12, 13, 14, 15 and any other teeth as indicated     LABS:  CBC:   Lab Results   Component Value Date    WBC 7.4 2020    WBC 7.1 2020    HGB 12.2 (L) 2020    HGB 11.4 (L) 2020    HCT 38.0 (L) 2020    HCT 35.8 (L) 2020     2020     2020     BMP:   Lab Results   Component Value Date     2020     2020    POTASSIUM 3.8 2020    POTASSIUM 4.2 2020    CHLORIDE 105 2020    CHLORIDE 105 2020    CO2 25 2020    CO2 24 2020    BUN 29 2020    BUN 31 (H) 2020    CR 1.77 (H) 2020    CR 1.69 (H) 2020     (H) 2020     (H) 2020     COAGS:   Lab Results   Component Value Date    PTT 29 2020    INR 1.12 2020     POC:   Lab Results   Component Value Date     (H) 2020     OTHER:   Lab Results   Component Value Date    LACT 1.0 2020    A1C 8.2 (H) 2020    BRYANNA 9.2 2020    PHOS 3.3 2019    MAG 1.9 2020    ALBUMIN 3.0 (L) 2020    PROTTOTAL 7.1 2020    ALT 24 2020    AST 26 2020    ALKPHOS 117 2020    BILITOTAL 1.0 2020    TSH 1.30 2020    CRP 9.9 2019        Preop Vitals    BP Readings from Last 3 Encounters:   20 102/70   20 107/75   12/10/19 107/70    Pulse Readings from Last 3 Encounters:   20 96   20 96   12/10/19 96      Resp Readings from Last 3 Encounters:   20 20   20 18   12/10/19 16    SpO2 Readings from Last 3 Encounters:   20 94%   20 96%   12/10/19 96%      Temp Readings from Last 1 Encounters:   20 37  C (98.6  F) (Oral)    Ht Readings from Last 1 Encounters:  "  07/03/20 1.626 m (5' 4\")      Wt Readings from Last 1 Encounters:   07/12/20 77.4 kg (170 lb 10.2 oz)    Estimated body mass index is 29.29 kg/m  as calculated from the following:    Height as of this encounter: 1.626 m (5' 4\").    Weight as of this encounter: 77.4 kg (170 lb 10.2 oz).     LDA:  Peripheral IV 07/03/20 Right;Anterior Upper forearm (Active)   Site Assessment WDL 07/12/20 0400   Line Status Saline locked 07/12/20 0400   Phlebitis Scale 0-->no symptoms 07/12/20 0400   Infiltration Scale 0 07/12/20 0400   Infiltration Site Treatment Method  None 07/10/20 1600   Extravasation? No 07/12/20 0400   Number of days: 9       Peripheral IV 07/07/20 Left Lower forearm (Active)   Site Assessment WDL 07/12/20 0400   Line Status Saline locked 07/12/20 0400   Phlebitis Scale 0-->no symptoms 07/12/20 0400   Infiltration Scale 0 07/12/20 0400   Infiltration Site Treatment Method  None 07/10/20 1600   Extravasation? No 07/12/20 0400   Dressing Intervention New dressing  07/07/20 0142   Number of days: 5       PICC Double Lumen 07/11/20 Right Basilic (Active)   Site Assessment WDL 07/12/20 0400   External Cath Length (cm) 1 cm 07/11/20 1700   Extremity Circumference (cm) 31 cm 07/11/20 1700   Dressing Intervention Chlorhexidine patch;Transparent 07/12/20 0400   Dressing Change Due 07/18/20 07/11/20 2000   PICC Comment CDI 07/12/20 0400   Lumen A - Color PURPLE 07/12/20 0400   Lumen A - Status infusing 07/12/20 0400   Lumen A - Cap Change Due 07/14/20 07/11/20 2000   Lumen B - Color WHITE 07/12/20 0400   Lumen B - Status transduced 07/12/20 0400   Lumen B - Cap Change Due 07/14/20 07/11/20 2000   Extravasation? No 07/12/20 0400   Line Necessity Yes, meets criteria 07/12/20 0400   Number of days: 1        Past Medical History:   Diagnosis Date     CAD (coronary artery disease)      CHF (congestive heart failure) (H)      CKD (chronic kidney disease), stage III (H)      Cortical cataract of both eyes      Diabetes (H)  "     Hyperlipidemia      Hypertension      Ischemic cardiomyopathy      Obesity      CHANTEL (obstructive sleep apnea)     occas cpap     Osteoarthritis       Past Surgical History:   Procedure Laterality Date     C CABG, ARTERY-VEIN, THREE  02/2008     CATARACT IOL, RT/LT       COLONOSCOPY N/A 8/7/2019    Procedure: COLONOSCOPY, WITH POLYPECTOMY AND BIOPSY;  Surgeon: Chauncey Morataya MD;  Location:  GI     CV RIGHT HEART CATH N/A 3/25/2019    Procedure: CV RIGHT HEART CATH;  Surgeon: Moises Santos MD;  Location:  HEART CARDIAC CATH LAB     CV RIGHT HEART CATH N/A 7/10/2019    Procedure: CV RIGHT HEART CATH;  Surgeon: Jak Mccabe MD;  Location:  HEART CARDIAC CATH LAB     CV RIGHT HEART CATH N/A 7/8/2020    Procedure: Right Heart Cath with Leave In [place] already has ICU load;  Surgeon: Jak Mccabe MD;  Location:  HEART CARDIAC CATH LAB     IMPLANT AUTOMATIC IMPLANTABLE CARDIOVERTER DEFIBRILLATOR       PHACOEMULSIFICATION CLEAR CORNEA WITH STANDARD IOL, VITRECTOMY PARSPLANA 25 GAGUE, COMBINED Left 12/10/2019    Procedure: PHACOEMULSIFICATION, CATARACT, CLEAR CORNEAL INCISION APPROACH, W STD INTRAOCULAR LENS IMPLANT INSERT + VITRECTOMY BY PARS PLANA  USING 25-GAUGE INSTRUMENTS. ENDOLASER, INFUSION OF 20% SF6 GAS;  Surgeon: Triny Bunch MD;  [facility]     PICC INSERTION Right 07/11/2020    basilic 44 cm total       Allergies   Allergen Reactions     No Known Drug Allergies         Anesthesia Evaluation     .             ROS/MED HX    ENT/Pulmonary:     (+)sleep apnea, , . .    Neurologic:       Cardiovascular: Comment: Ischemic cardiomyopathy and severe LV systolic dysfunction s/p 3V CABG (2008) and primary prevention ICD ( 4/2/15).       (+) hypertension--CAD, --. : . CHF . . :. . Previous cardiac testing Echodate:7/2020results:Severely (EF 10-20%) reduced left ventricular function is present. LVEF 15% based on biplane 2D tracing. Severe left ventricular dilation is  present.  LVIDd:6.8 cm. Grade III or advanced diastolic dysfunction.  The right ventricle is normal size.  Global right ventricular function is moderately reduced.  No significant valvular abnormalities were noted.  IVC diameter and respiratory changes fall into an intermediate range suggesting an RA pressure of 8 mmHg.  Mild pulmonary hypertension is present.date: results:ECG reviewed date:7/8/2020 results:Sinus rhythm  Left axis deviation  Non-specific intra-ventricular conduction block  Abnormal ECGCath date: 7/2020 results:Right sided filling pressures are mildly elevated.Left sided filling pressures are severely elevated. Severely elevated pulmonary artery hypertension.Left ventricular filling pressures are severely elevated .Reduced cardiac output level.          METS/Exercise Tolerance:     Hematologic:         Musculoskeletal:         GI/Hepatic:         Renal/Genitourinary:     (+) chronic renal disease,       Endo:     (+) type II DM Obesity, .      Psychiatric:         Infectious Disease: Comment: COVID neg 7/7/2020          Malignancy:         Other:                         PHYSICAL EXAM:   Mental Status/Neuro: A/A/O   Airway: Facies: Feasible  Mallampati: II  Mouth/Opening: Full  TM distance: > 6 cm  Neck ROM: Full   Respiratory: Auscultation: CTAB     Resp. Rate: Normal     Resp. Effort: Normal      CV: Rhythm: Regular  Rate: Age appropriate   Comments: Multiple dental caries                     Assessment:   ASA SCORE: 4    H&P: History and physical reviewed and following examination; no interval change.   Smoking Status:  Non-Smoker/Unknown   NPO Status: NPO Appropriate     Plan:   Anes. Type:  General   Pre-Medication: None   Induction:  IV (Standard)   Airway: ETT; Oral   Access/Monitoring: PIV   Maintenance: Balanced     Drips/Meds: dobutamine.     Postop Plan:   Postop Pain: Opioids  Postop Sedation/Airway: Not planned  Disposition: ICU     PONV Management:   Adult Risk Factors:, Non-Smoker,  Postop Opioids   Prevention: Ondansetron     CONSENT: Direct conversation   Plan and risks discussed with: Patient   Blood Products: Consent Deferred (Minimal Blood Loss)                   Angel Meza DO

## 2020-07-12 NOTE — PLAN OF CARE
Major Shift Events:  A&Ox4, SR with HR 65-85, MAP > 65, afebrile. Heparin gtt therapeutic @ 950. Room air, O2 sats >92%, lung sounds clear. Active bowel sounds, voids spontaneously in urinal. [place] line pulled.    Plan: Continue with LVAD work-up.    For vital signs and complete assessments, please see documentation flowsheets.

## 2020-07-12 NOTE — PROGRESS NOTES
[facility]   Cardiology II Service / Advanced Heart Failure  Progress note    Lucian Morillo  : 1955  MRN # [id number]    ADMIT DATE: 7/3/2020    Changes today  - dental to schedule tooth extractions (oral surgery planning to do in OR ), NPO at [facility]   - continue diuretics with lasix 40mg IV BID   - transfer to floor   - stop losartan     ASSESSMENT & PLAN:  Lucian Henderson Sr. is a 64 year old male with a PMH of HTN, DMII, obesity, CKD, CHANTEL, ischemic cardiomyopathy and severe LV systolic dysfunction s/p 3V CABG () and primary prevention ICD ( 4/2/15). Currently he presents with 3 days of worsening fatigue and SOB with minimal exertion. Patient claims he could walk 1-2 blocks last week but has been barely able to ambulate home recently.     Admitted for possible LVAD/Transplant work up.    Date RA MPAP PCWP SV02 Jacy CO Jacy CI MAP SVR PVR Intervention   20 8 50 38 49 2.3 1.28 89 2400 5.2    20  6 48 28 35 4.0 2.1 69 1300 2.2 Nipride, PO afterload reduction    7/10 9 56/34 32 68 5.1 2.7 68 900 1.8 Nipride 1, PO afterload reduction    14 65/42/50 36 60 4.3 2.2 89 1100 1.8 Nipride 0.25, PO afterload reduction        #Atrial Flutter with RVR  Patient went into Atrial flutter with RVR with rates around 150 overnight on 2020 at around 2 am. 5 mg IV beta-blocker was administered but this significantly lowered the patient's blood pressure.  - 5 mg IV beta-blocker administered and heparin started on 20 over night when patient went into flutter.    - Stopped Lasix, 250 ml LR Bolus (20)  - IV Amiodarone bolus 150 , followed by 1mg/min infusion for 6h followed by 0.5mg/min, started amiodarone 400 mg PO BID  (will do BID dosing for ~ 5 days,  can go to daily dosing)   - Digoxin  mcg (20)    # ICM s/p 3V CABG () and primary prevention ICD ( 4/2/15)  #HFrEF Stage D, NYHA III-IV  # Ambulatory cardiogenic shock  # Arrhythmia: HF  "associated VT/VF  Currently he presents with 3 days of worsening fatigue and SOB with minimal exertion, cardiac cachexia and cold extremities. Admitted for possible LVAD/Transplant work up. - Echocardiogram showed an EF of 15% on 7/5/20  - Diuresis : lasix 40 mg IV BID   - ASA 81 mg daily  - Atorvastatin 40 daily  - Hydralazine 75 TID, IMDUR 60 mg  - Stop losartan   - He has had a colonoscopy on 8/7/19    - IABP placement tentatively on Tuesday   - NPO at [facility] for dental extraction as part of pre-heart tx work-up     # DMII ( A1c: 8.3)   - Finger sticks  - Hypoglycemia protocol   - Moderate intensity sliding scale insulin       #CKD  - Monitor Scr and K Q12h   - Avoid nephrotoxic agents    Floor Care  Fluids: 1.5L fluid restriction  Food: 2 gram sodium diet, low fat diet  Electrolyte repletion: potassium/magnesium sliding scale  DVT ppx: Lovenox  Glycemic Control: sliding scale insulin   Lines: PICC   Consults: none  Code Status: full    Patient was discussed with attending physician, Dr. Anand.     Fina Biswas  Cardiology PGY5  ..........................................................................................................................................................  Subjective:  SARITA overnight.   Reported doing well this morning. Denied any SOB or chest pain     PHYSICAL EXAM:  /70   Pulse 96   Temp 98.6  F (37  C) (Oral)   Resp 20   Ht 1.626 m (5' 4\")   Wt 77.4 kg (170 lb 10.2 oz)   SpO2 94%   BMI 29.29 kg/m    GENERAL: NAD  NECK: Supple and without lymphadenopathy. JVP 12-14cm  CV: S1/S2 heard without murmur   RESPIRATORY: CTAB  GI: Soft and distended. No tenderness, rebound, guarding. No palpable organomegaly.   EXTREMITIES: Peripheral edema trace.   NEUROLOGIC: Alert and orientated x 3.   MUSCULOSKELETAL: No joint swelling or tenderness.   SKIN: No jaundice. No acute rashes or lesions.     ROS:   12-pt ROS otherwise negative except as noted above    PMH:  Past Medical History: "   Diagnosis Date     CAD (coronary artery disease)      CHF (congestive heart failure) (H)      CKD (chronic kidney disease), stage III (H)      Cortical cataract of both eyes      Diabetes (H)      Hyperlipidemia      Hypertension      Ischemic cardiomyopathy      Obesity      CHANTEL (obstructive sleep apnea)     occas cpap     Osteoarthritis        PSH:  Past Surgical History:   Procedure Laterality Date     C CABG, ARTERY-VEIN, THREE  02/2008     CATARACT IOL, RT/LT       COLONOSCOPY N/A 8/7/2019    Procedure: COLONOSCOPY, WITH POLYPECTOMY AND BIOPSY;  Surgeon: Chauncey Morataya MD;  Location:  GI     CV RIGHT HEART CATH N/A 3/25/2019    Procedure: CV RIGHT HEART CATH;  Surgeon: Moises Santos MD;  Location:  HEART CARDIAC CATH LAB     CV RIGHT HEART CATH N/A 7/10/2019    Procedure: CV RIGHT HEART CATH;  Surgeon: Jak Mccabe MD;  Location:  HEART CARDIAC CATH LAB     CV RIGHT HEART CATH N/A 7/8/2020    Procedure: Right Heart Cath with Leave In [place] already has ICU load;  Surgeon: Jak Mccabe MD;  Location:  HEART CARDIAC CATH LAB     IMPLANT AUTOMATIC IMPLANTABLE CARDIOVERTER DEFIBRILLATOR       PHACOEMULSIFICATION CLEAR CORNEA WITH STANDARD IOL, VITRECTOMY PARSPLANA 25 GAGUE, COMBINED Left 12/10/2019    Procedure: PHACOEMULSIFICATION, CATARACT, CLEAR CORNEAL INCISION APPROACH, W STD INTRAOCULAR LENS IMPLANT INSERT + VITRECTOMY BY PARS PLANA  USING 25-GAUGE INSTRUMENTS. ENDOLASER, INFUSION OF 20% SF6 GAS;  Surgeon: Tirny Bunch MD;  [facility]     PICC INSERTION Right 07/11/2020    basilic 44 cm total        MEDICATIONS:  Prior to Admission Medications   Prescriptions Last Dose Informant Patient Reported? Taking?   aspirin 81 MG tablet  Self No No   Sig: Take 1 tablet (81 mg) by mouth daily   atorvastatin (LIPITOR) 40 MG tablet  Self No No   Sig: Take 1 tablet (40 mg) by mouth daily   blood glucose (ACCU-CHEK SMARTVIEW) test strip  Self No No   Sig: USE TO TEST THREE TIMES  DAILY AS DIRECTED   blood glucose (NO BRAND SPECIFIED) test strip  Self No No   Sig: Use to test blood sugar 2 times daily or as directed.   blood glucose monitoring (ACCU-CHEK FELIPE SMARTVIEW) meter device kit  Self No No   Sig: Use to test blood sugar 3-4 times daily, as directed.   blood glucose monitoring (ONE TOUCH DELICA) lancets  Self No No   Sig: Use to test blood sugars 2 times daily.   clopidogrel (PLAVIX) 75 MG tablet  Self No No   Sig: Take 1 tablet (75 mg) by mouth daily   exenatide ER (BYDUREON) 2 MG pen  Self No No   Sig: Inject 2 mg Subcutaneous every 7 days   furosemide (LASIX) 20 MG tablet  Self No No   Sig: Take 2 tablets (40 mg) by mouth 2 times daily   glimepiride (AMARYL) 4 MG tablet  Self No No   Sig: Take 1 tablet (4 mg) by mouth every morning (before breakfast)   hydrALAZINE (APRESOLINE) 25 MG tablet  Self No No   Sig: Take 1 tablet (25 mg) by mouth 3 times daily   insulin glargine (LANTUS SOLOSTAR) 100 UNIT/ML pen  Self No No   Sig: Take 8 units in the morning and 40 units in the evening   insulin pen needle (B-D U/F) 31G X 5 MM miscellaneous  Self No No   Si Units by Device route 2 times daily Use 2 pen needles daily or as directed.   isosorbide mononitrate (IMDUR) 60 MG 24 hr tablet  Self No No   Sig: Take 1 tablet (60 mg) by mouth daily   losartan (COZAAR) 50 MG tablet  Spouse/Significant Other No No   Sig: Take 1 tablet (50 mg) by mouth daily   nitroglycerin (NITROSTAT) 0.4 MG SL tablet  Self No No   Sig: Place 1 tablet (0.4 mg) under the tongue every 5 minutes as needed for chest pain If you are still having symptoms after 3 doses (15 minutes) call 911.   order for DME  Self No No   Sig: Auto CPAP 8-15 cmH20      Facility-Administered Medications Last Administration Doses Remaining   erythromycin (ROMYCIN) ophthalmic ointment None recorded    lidocaine (PF) (XYLOCAINE) 2 % injection 10 mL None recorded 1           ALLERGIES:     Allergies   Allergen Reactions     No Known Drug  Allergies        FAMILY HISTORY:  Family History   Problem Relation Age of Onset     Diabetes Brother      Diabetes Sister      Diabetes Sister      Macular Degeneration No family hx of      Glaucoma No family hx of      Myocardial Infarction No family hx of      Kidney Disease No family hx of        SOCIAL HISTORY:  Social History     Socioeconomic History     Marital status:      Spouse name: Not on file     Number of children: 4     Years of education: Not on file     Highest education level: Not on file   Occupational History     Occupation:      Employer: [employer]     Employer: RETIRED   Social Needs     Financial resource strain: Not on file     Food insecurity     Worry: Not on file     Inability: Not on file     Transportation needs     Medical: Not on file     Non-medical: Not on file   Tobacco Use     Smoking status: Never Smoker     Smokeless tobacco: Never Used     Tobacco comment: Never smoked; non-smoking household   Substance and Sexual Activity     Alcohol use: No     Alcohol/week: 0.0 standard drinks     Drug use: No     Sexual activity: Yes     Partners: Female   Lifestyle     Physical activity     Days per week: Not on file     Minutes per session: Not on file     Stress: Not on file   Relationships     Social connections     Talks on phone: Not on file     Gets together: Not on file     Attends [employer] service: Not on file     Active member of club or organization: Not on file     Attends meetings of clubs or organizations: Not on file     Relationship status: Not on file     Intimate partner violence     Fear of current or ex partner: Not on file     Emotionally abused: Not on file     Physically abused: Not on file     Forced sexual activity: Not on file   Other Topics Concern     Parent/sibling w/ CABG, MI or angioplasty before 65F 55M? No   Social History Narrative     Not on file       LABS:  BMP  Recent Labs   Lab 07/12/20  0335 07/11/20  1717 07/11/20  0331  07/10/20  1607    135 137 136   POTASSIUM 3.8 4.2 4.0 4.3   CHLORIDE 105 105 107 109   CO2 25 24 26 24   BUN 29 31* 29 30   CR 1.77* 1.69* 1.63* 1.55*   * 194* 126* 177*   BRYANNA 9.2 8.8 8.8 8.1*     CBC  Recent Labs   Lab 07/12/20  0335 07/11/20  0458 07/11/20  0331 07/10/20  0332   WBC 7.4 7.1 Canceled, Test credited 7.4   HGB 12.2* 11.4* Canceled, Test credited 11.4*   HCT 38.0* 35.8* Canceled, Test credited 36.2*   MCV 97 97 Canceled, Test credited 97    189 Canceled, Test credited 185     INR  No lab results found in last 7 days.  IMAGING:    RHC 6/18/20     RA 20/26/18  RV 85/28  PA 82/45/58  PCWP 45  Cardiac output by Jacy: 3.85 L/min (indexed to 2.09 L/min/m2)  Cardiac output by thermodilution: 3.63 L/min (indexed to 1.97 L/min/m2)  SVR (by TD CO): 1123  PVR (by TD CO): 7.4    Angiogram 6/18/20   3 vessel CAD s/p 3V CABG in 2008 (LIMA-LAD, SVG-OM1, SVG-RPDA)  2. Known proximal SVG-RPDA occlusion  3. Presumed occlusion of SVG-OM1 (unable to locate on non-selective aortic root shot)  4. Patent LIMA-LAD with collateralization of LAD to PDA    Echocardiogram ( 3/11/2019)   Moderate to severe left ventricular dilation is present.  Severely (EF 10-20%) reduced left ventricular function is present.  No significant valve dysfunction.  Compared to study done 6/5/17 there is no significant change in LV size and  systolic function    Echocardiogram ( 7/5/2020)   Interpretation Summary  Severely (EF 10-20%) reduced left ventricular function is present. LVEF 15%  based on biplane 2D tracing. Severe left ventricular dilation is present.  LVIDd:6.8 cm. Grade III or advanced diastolic dysfunction.  The right ventricle is normal size.  Global right ventricular function is moderately reduced.  No significant valvular abnormalities were noted.  IVC diameter and respiratory changes fall into an intermediate range  suggesting an RA pressure of 8 mmHg.  Mild pulmonary hypertension is present.     This study was  compared with the study from 3/25/2019. RV function has  worsened, LV size and function appear similar.    Devices  ICD ([employer]- 4/2/2015 )

## 2020-07-12 NOTE — PLAN OF CARE
Major Shift Events:  Afebrile, SR, HR 70-80's, MAPS 65-77. Heparin drip at 950 units/hr. RA Sats 95-98%. Voiding Spont, Tolerating diet, passing gas no stool on stool softeners.   Plan: Transfer to  when bed is available   For vital signs and complete assessments, please see documentation flowsheets.

## 2020-07-12 NOTE — PROGRESS NOTES
OMFS Brief Note:     Received call from General Dental team--they requested OMFS assistance with this patient who requires extractions.  Reviewed dental CT--panoramic reconstruction reveals multiple teeth with PARLs.    Will plan for OR on Monday whenever we can get OR time for extractions of 11, 12, 13, 14, 15, as well as exam under anesthesia, possible extractions of other teeth if indicated . Ideally this would be in the morning but we are on the add-on list.      Please make patient NPO at midnight Sunday for surgery Monday.    --Continue aspirin  --Please hold heparin 6 hours prior to planned surgery

## 2020-07-13 ENCOUNTER — ANESTHESIA EVENT (OUTPATIENT)
Dept: SURGERY | Facility: CLINIC | Age: 65
DRG: 001 | End: 2020-07-13
Payer: COMMERCIAL

## 2020-07-13 ENCOUNTER — ANESTHESIA (OUTPATIENT)
Dept: SURGERY | Facility: CLINIC | Age: 65
DRG: 001 | End: 2020-07-13
Payer: COMMERCIAL

## 2020-07-13 LAB
ANION GAP SERPL CALCULATED.3IONS-SCNC: 6 MMOL/L (ref 3–14)
ANION GAP SERPL CALCULATED.3IONS-SCNC: 7 MMOL/L (ref 3–14)
BUN SERPL-MCNC: 36 MG/DL (ref 7–30)
BUN SERPL-MCNC: 37 MG/DL (ref 7–30)
CALCIUM SERPL-MCNC: 9 MG/DL (ref 8.5–10.1)
CALCIUM SERPL-MCNC: 9.2 MG/DL (ref 8.5–10.1)
CHLORIDE SERPL-SCNC: 102 MMOL/L (ref 94–109)
CHLORIDE SERPL-SCNC: 103 MMOL/L (ref 94–109)
CO2 SERPL-SCNC: 25 MMOL/L (ref 20–32)
CO2 SERPL-SCNC: 26 MMOL/L (ref 20–32)
CREAT SERPL-MCNC: 1.87 MG/DL (ref 0.66–1.25)
CREAT SERPL-MCNC: 1.98 MG/DL (ref 0.66–1.25)
ERYTHROCYTE [DISTWIDTH] IN BLOOD BY AUTOMATED COUNT: 15.6 % (ref 10–15)
GFR SERPL CREATININE-BSD FRML MDRD: 34 ML/MIN/{1.73_M2}
GFR SERPL CREATININE-BSD FRML MDRD: 36 ML/MIN/{1.73_M2}
GLUCOSE BLDC GLUCOMTR-MCNC: 153 MG/DL (ref 70–99)
GLUCOSE BLDC GLUCOMTR-MCNC: 166 MG/DL (ref 70–99)
GLUCOSE BLDC GLUCOMTR-MCNC: 182 MG/DL (ref 70–99)
GLUCOSE BLDC GLUCOMTR-MCNC: 184 MG/DL (ref 70–99)
GLUCOSE BLDC GLUCOMTR-MCNC: 244 MG/DL (ref 70–99)
GLUCOSE BLDC GLUCOMTR-MCNC: 298 MG/DL (ref 70–99)
GLUCOSE SERPL-MCNC: 138 MG/DL (ref 70–99)
GLUCOSE SERPL-MCNC: 160 MG/DL (ref 70–99)
HCT VFR BLD AUTO: 38.6 % (ref 40–53)
HGB BLD-MCNC: 12.1 G/DL (ref 13.3–17.7)
INR PPP: 1.05 (ref 0.86–1.14)
INTERPRETATION ECG - MUSE: NORMAL
LMWH PPP CHRO-ACNC: 0.15 IU/ML
LMWH PPP CHRO-ACNC: 0.27 IU/ML
MAGNESIUM SERPL-MCNC: 2 MG/DL (ref 1.6–2.3)
MAGNESIUM SERPL-MCNC: 2.3 MG/DL (ref 1.6–2.3)
MCH RBC QN AUTO: 30.1 PG (ref 26.5–33)
MCHC RBC AUTO-ENTMCNC: 31.3 G/DL (ref 31.5–36.5)
MCV RBC AUTO: 96 FL (ref 78–100)
MDC_IDC_LEAD_IMPLANT_DT: NORMAL
MDC_IDC_LEAD_LOCATION: NORMAL
MDC_IDC_LEAD_LOCATION_DETAIL_1: NORMAL
MDC_IDC_LEAD_MFG: NORMAL
MDC_IDC_LEAD_MODEL: NORMAL
MDC_IDC_LEAD_POLARITY_TYPE: NORMAL
MDC_IDC_LEAD_SERIAL: NORMAL
MDC_IDC_MSMT_BATTERY_DTM: NORMAL
MDC_IDC_MSMT_BATTERY_STATUS: NORMAL
MDC_IDC_MSMT_CAP_CHARGE_DTM: NORMAL
MDC_IDC_MSMT_CAP_CHARGE_ENERGY: 41 J
MDC_IDC_MSMT_CAP_CHARGE_TIME: 7.92
MDC_IDC_MSMT_CAP_CHARGE_TIME: 9.38
MDC_IDC_MSMT_CAP_CHARGE_TYPE: NORMAL
MDC_IDC_MSMT_CAP_CHARGE_TYPE: NORMAL
MDC_IDC_MSMT_LEADCHNL_RV_IMPEDANCE_VALUE: 473 OHM
MDC_IDC_MSMT_LEADCHNL_RV_PACING_THRESHOLD_AMPLITUDE: 0.8 V
MDC_IDC_MSMT_LEADCHNL_RV_PACING_THRESHOLD_PULSEWIDTH: 0.5 MS
MDC_IDC_MSMT_LEADCHNL_RV_SENSING_INTR_AMPL: 18.7 MV
MDC_IDC_PG_IMPLANT_DTM: NORMAL
MDC_IDC_PG_MFG: NORMAL
MDC_IDC_PG_MODEL: NORMAL
MDC_IDC_PG_SERIAL: NORMAL
MDC_IDC_PG_TYPE: NORMAL
MDC_IDC_SESS_CLINIC_NAME: NORMAL
MDC_IDC_SESS_DTM: NORMAL
MDC_IDC_SESS_TYPE: NORMAL
MDC_IDC_SET_BRADY_HYSTRATE: NORMAL
MDC_IDC_SET_BRADY_LOWRATE: 40 {BEATS}/MIN
MDC_IDC_SET_BRADY_MODE: NORMAL
MDC_IDC_SET_LEADCHNL_RV_PACING_AMPLITUDE: 2.4 V
MDC_IDC_SET_LEADCHNL_RV_PACING_CAPTURE_MODE: NORMAL
MDC_IDC_SET_LEADCHNL_RV_PACING_POLARITY: NORMAL
MDC_IDC_SET_LEADCHNL_RV_PACING_PULSEWIDTH: 0.5 MS
MDC_IDC_SET_LEADCHNL_RV_SENSING_ADAPTATION_MODE: NORMAL
MDC_IDC_SET_LEADCHNL_RV_SENSING_POLARITY: NORMAL
MDC_IDC_SET_LEADCHNL_RV_SENSING_SENSITIVITY: 0.6 MV
MDC_IDC_SET_ZONE_DETECTION_INTERVAL: 240 MS
MDC_IDC_SET_ZONE_DETECTION_INTERVAL: 300 MS
MDC_IDC_SET_ZONE_DETECTION_INTERVAL: 400 MS
MDC_IDC_SET_ZONE_TYPE: NORMAL
MDC_IDC_SET_ZONE_VENDOR_TYPE: NORMAL
MDC_IDC_STAT_BRADY_DTM_END: NORMAL
MDC_IDC_STAT_BRADY_DTM_START: NORMAL
MDC_IDC_STAT_BRADY_RV_PERCENT_PACED: 0 %
MDC_IDC_STAT_EPISODE_RECENT_COUNT: 34
MDC_IDC_STAT_EPISODE_RECENT_COUNT: 3909
MDC_IDC_STAT_EPISODE_RECENT_COUNT: 53
MDC_IDC_STAT_EPISODE_RECENT_COUNT_DTM_END: NORMAL
MDC_IDC_STAT_EPISODE_RECENT_COUNT_DTM_START: NORMAL
MDC_IDC_STAT_EPISODE_TOTAL_COUNT: 35
MDC_IDC_STAT_EPISODE_TOTAL_COUNT: 4378
MDC_IDC_STAT_EPISODE_TOTAL_COUNT: 54
MDC_IDC_STAT_EPISODE_TOTAL_COUNT_DTM_END: NORMAL
MDC_IDC_STAT_EPISODE_TYPE: NORMAL
MDC_IDC_STAT_EPISODE_VENDOR_TYPE: NORMAL
MDC_IDC_STAT_TACHYTHERAPY_ATP_DELIVERED_RECENT: 7
MDC_IDC_STAT_TACHYTHERAPY_ATP_DELIVERED_TOTAL: 7
MDC_IDC_STAT_TACHYTHERAPY_RECENT_DTM_END: NORMAL
MDC_IDC_STAT_TACHYTHERAPY_RECENT_DTM_START: NORMAL
MDC_IDC_STAT_TACHYTHERAPY_SHOCKS_ABORTED_RECENT: 5
MDC_IDC_STAT_TACHYTHERAPY_SHOCKS_ABORTED_TOTAL: 5
MDC_IDC_STAT_TACHYTHERAPY_SHOCKS_DELIVERED_RECENT: 19
MDC_IDC_STAT_TACHYTHERAPY_SHOCKS_DELIVERED_TOTAL: 19
MDC_IDC_STAT_TACHYTHERAPY_TOTAL_DTM_END: NORMAL
PLATELET # BLD AUTO: 251 10E9/L (ref 150–450)
POTASSIUM SERPL-SCNC: 4.1 MMOL/L (ref 3.4–5.3)
POTASSIUM SERPL-SCNC: 4.8 MMOL/L (ref 3.4–5.3)
RBC # BLD AUTO: 4.02 10E12/L (ref 4.4–5.9)
SODIUM SERPL-SCNC: 134 MMOL/L (ref 133–144)
SODIUM SERPL-SCNC: 136 MMOL/L (ref 133–144)
WBC # BLD AUTO: 7.1 10E9/L (ref 4–11)

## 2020-07-13 PROCEDURE — 80048 BASIC METABOLIC PNL TOTAL CA: CPT | Performed by: INTERNAL MEDICINE

## 2020-07-13 PROCEDURE — 25000128 H RX IP 250 OP 636: Performed by: NURSE ANESTHETIST, CERTIFIED REGISTERED

## 2020-07-13 PROCEDURE — 0CDXXZ0 EXTRACTION OF LOWER TOOTH, SINGLE, EXTERNAL APPROACH: ICD-10-PCS | Performed by: DENTIST

## 2020-07-13 PROCEDURE — 85520 HEPARIN ASSAY: CPT | Performed by: INTERNAL MEDICINE

## 2020-07-13 PROCEDURE — 40000196 ZZH STATISTIC RAPCV CVP MONITORING

## 2020-07-13 PROCEDURE — 25000132 ZZH RX MED GY IP 250 OP 250 PS 637: Performed by: STUDENT IN AN ORGANIZED HEALTH CARE EDUCATION/TRAINING PROGRAM

## 2020-07-13 PROCEDURE — 25000128 H RX IP 250 OP 636: Performed by: STUDENT IN AN ORGANIZED HEALTH CARE EDUCATION/TRAINING PROGRAM

## 2020-07-13 PROCEDURE — 25000128 H RX IP 250 OP 636

## 2020-07-13 PROCEDURE — 40000275 ZZH STATISTIC RCP TIME EA 10 MIN

## 2020-07-13 PROCEDURE — 25000132 ZZH RX MED GY IP 250 OP 250 PS 637: Performed by: INTERNAL MEDICINE

## 2020-07-13 PROCEDURE — 25800030 ZZH RX IP 258 OP 636: Performed by: NURSE ANESTHETIST, CERTIFIED REGISTERED

## 2020-07-13 PROCEDURE — 27210794 ZZH OR GENERAL SUPPLY STERILE: Performed by: DENTIST

## 2020-07-13 PROCEDURE — 25000125 ZZHC RX 250: Performed by: NURSE ANESTHETIST, CERTIFIED REGISTERED

## 2020-07-13 PROCEDURE — 25000132 ZZH RX MED GY IP 250 OP 250 PS 637: Performed by: SURGERY

## 2020-07-13 PROCEDURE — 85027 COMPLETE CBC AUTOMATED: CPT | Performed by: INTERNAL MEDICINE

## 2020-07-13 PROCEDURE — 25000565 ZZH ISOFLURANE, EA 15 MIN: Performed by: DENTIST

## 2020-07-13 PROCEDURE — 80048 BASIC METABOLIC PNL TOTAL CA: CPT | Performed by: STUDENT IN AN ORGANIZED HEALTH CARE EDUCATION/TRAINING PROGRAM

## 2020-07-13 PROCEDURE — 93005 ELECTROCARDIOGRAM TRACING: CPT

## 2020-07-13 PROCEDURE — 37000008 ZZH ANESTHESIA TECHNICAL FEE, 1ST 30 MIN: Performed by: DENTIST

## 2020-07-13 PROCEDURE — 93010 ELECTROCARDIOGRAM REPORT: CPT | Performed by: INTERNAL MEDICINE

## 2020-07-13 PROCEDURE — 25000125 ZZHC RX 250: Performed by: DENTIST

## 2020-07-13 PROCEDURE — 36000057 ZZH SURGERY LEVEL 3 1ST 30 MIN - UMMC: Performed by: DENTIST

## 2020-07-13 PROCEDURE — 00000146 ZZHCL STATISTIC GLUCOSE BY METER IP

## 2020-07-13 PROCEDURE — 85610 PROTHROMBIN TIME: CPT | Performed by: INTERNAL MEDICINE

## 2020-07-13 PROCEDURE — 0CDWXZ1 EXTRACTION OF UPPER TOOTH, MULTIPLE, EXTERNAL APPROACH: ICD-10-PCS | Performed by: DENTIST

## 2020-07-13 PROCEDURE — 99233 SBSQ HOSP IP/OBS HIGH 50: CPT | Mod: 25 | Performed by: INTERNAL MEDICINE

## 2020-07-13 PROCEDURE — 25000128 H RX IP 250 OP 636: Performed by: SURGERY

## 2020-07-13 PROCEDURE — 37000009 ZZH ANESTHESIA TECHNICAL FEE, EACH ADDTL 15 MIN: Performed by: DENTIST

## 2020-07-13 PROCEDURE — 71000016 ZZH RECOVERY PHASE 1 LEVEL 3 FIRST HR: Performed by: DENTIST

## 2020-07-13 PROCEDURE — 40000170 ZZH STATISTIC PRE-PROCEDURE ASSESSMENT II: Performed by: DENTIST

## 2020-07-13 PROCEDURE — 25000128 H RX IP 250 OP 636: Performed by: INTERNAL MEDICINE

## 2020-07-13 PROCEDURE — 83735 ASSAY OF MAGNESIUM: CPT | Performed by: INTERNAL MEDICINE

## 2020-07-13 PROCEDURE — 36000059 ZZH SURGERY LEVEL 3 EA 15 ADDTL MIN UMMC: Performed by: DENTIST

## 2020-07-13 PROCEDURE — 12000001 ZZH R&B MED SURG/OB UMMC

## 2020-07-13 PROCEDURE — 83735 ASSAY OF MAGNESIUM: CPT | Performed by: STUDENT IN AN ORGANIZED HEALTH CARE EDUCATION/TRAINING PROGRAM

## 2020-07-13 RX ORDER — OXYCODONE HYDROCHLORIDE 5 MG/1
5 TABLET ORAL EVERY 4 HOURS PRN
Status: DISCONTINUED | OUTPATIENT
Start: 2020-07-13 | End: 2020-07-18 | Stop reason: CLARIF

## 2020-07-13 RX ORDER — ONDANSETRON 2 MG/ML
INJECTION INTRAMUSCULAR; INTRAVENOUS PRN
Status: DISCONTINUED | OUTPATIENT
Start: 2020-07-13 | End: 2020-07-13

## 2020-07-13 RX ORDER — EPHEDRINE SULFATE 50 MG/ML
INJECTION, SOLUTION INTRAMUSCULAR; INTRAVENOUS; SUBCUTANEOUS PRN
Status: DISCONTINUED | OUTPATIENT
Start: 2020-07-13 | End: 2020-07-13

## 2020-07-13 RX ORDER — HYDROMORPHONE HYDROCHLORIDE 1 MG/ML
.3-.5 INJECTION, SOLUTION INTRAMUSCULAR; INTRAVENOUS; SUBCUTANEOUS EVERY 5 MIN PRN
Status: DISCONTINUED | OUTPATIENT
Start: 2020-07-13 | End: 2020-07-13 | Stop reason: HOSPADM

## 2020-07-13 RX ORDER — NALOXONE HYDROCHLORIDE 0.4 MG/ML
.1-.4 INJECTION, SOLUTION INTRAMUSCULAR; INTRAVENOUS; SUBCUTANEOUS
Status: ACTIVE | OUTPATIENT
Start: 2020-07-13 | End: 2020-07-14

## 2020-07-13 RX ORDER — FENTANYL CITRATE 50 UG/ML
INJECTION, SOLUTION INTRAMUSCULAR; INTRAVENOUS PRN
Status: DISCONTINUED | OUTPATIENT
Start: 2020-07-13 | End: 2020-07-13

## 2020-07-13 RX ORDER — PROPOFOL 10 MG/ML
INJECTION, EMULSION INTRAVENOUS PRN
Status: DISCONTINUED | OUTPATIENT
Start: 2020-07-13 | End: 2020-07-13

## 2020-07-13 RX ORDER — FENTANYL CITRATE 50 UG/ML
25-50 INJECTION, SOLUTION INTRAMUSCULAR; INTRAVENOUS
Status: DISCONTINUED | OUTPATIENT
Start: 2020-07-13 | End: 2020-07-13 | Stop reason: HOSPADM

## 2020-07-13 RX ORDER — TRIMETHOBENZAMIDE HYDROCHLORIDE 300 MG/1
300 CAPSULE ORAL 3 TIMES DAILY PRN
Status: DISCONTINUED | OUTPATIENT
Start: 2020-07-13 | End: 2020-07-20 | Stop reason: CLARIF

## 2020-07-13 RX ORDER — CHLORHEXIDINE GLUCONATE ORAL RINSE 1.2 MG/ML
10 SOLUTION DENTAL ONCE
Status: COMPLETED | OUTPATIENT
Start: 2020-07-13 | End: 2020-07-13

## 2020-07-13 RX ORDER — KETAMINE HYDROCHLORIDE 10 MG/ML
INJECTION INTRAMUSCULAR; INTRAVENOUS PRN
Status: DISCONTINUED | OUTPATIENT
Start: 2020-07-13 | End: 2020-07-13

## 2020-07-13 RX ORDER — ONDANSETRON 4 MG/1
4 TABLET, ORALLY DISINTEGRATING ORAL EVERY 30 MIN PRN
Status: DISCONTINUED | OUTPATIENT
Start: 2020-07-13 | End: 2020-07-13 | Stop reason: HOSPADM

## 2020-07-13 RX ORDER — SODIUM CHLORIDE, SODIUM LACTATE, POTASSIUM CHLORIDE, CALCIUM CHLORIDE 600; 310; 30; 20 MG/100ML; MG/100ML; MG/100ML; MG/100ML
INJECTION, SOLUTION INTRAVENOUS CONTINUOUS
Status: DISCONTINUED | OUTPATIENT
Start: 2020-07-13 | End: 2020-07-13 | Stop reason: HOSPADM

## 2020-07-13 RX ORDER — BUPIVACAINE HYDROCHLORIDE AND EPINEPHRINE 2.5; 5 MG/ML; UG/ML
INJECTION, SOLUTION INFILTRATION; PERINEURAL PRN
Status: DISCONTINUED | OUTPATIENT
Start: 2020-07-13 | End: 2020-07-13 | Stop reason: HOSPADM

## 2020-07-13 RX ORDER — ONDANSETRON 2 MG/ML
4 INJECTION INTRAMUSCULAR; INTRAVENOUS EVERY 30 MIN PRN
Status: DISCONTINUED | OUTPATIENT
Start: 2020-07-13 | End: 2020-07-13 | Stop reason: HOSPADM

## 2020-07-13 RX ORDER — FUROSEMIDE 40 MG
40 TABLET ORAL
Status: DISCONTINUED | OUTPATIENT
Start: 2020-07-13 | End: 2020-07-13

## 2020-07-13 RX ORDER — SODIUM CHLORIDE, SODIUM LACTATE, POTASSIUM CHLORIDE, CALCIUM CHLORIDE 600; 310; 30; 20 MG/100ML; MG/100ML; MG/100ML; MG/100ML
INJECTION, SOLUTION INTRAVENOUS CONTINUOUS PRN
Status: DISCONTINUED | OUTPATIENT
Start: 2020-07-13 | End: 2020-07-13

## 2020-07-13 RX ORDER — CEFAZOLIN SODIUM 1 G/3ML
1 INJECTION, POWDER, FOR SOLUTION INTRAMUSCULAR; INTRAVENOUS SEE ADMIN INSTRUCTIONS
Status: DISCONTINUED | OUTPATIENT
Start: 2020-07-13 | End: 2020-07-13 | Stop reason: HOSPADM

## 2020-07-13 RX ORDER — CEFAZOLIN SODIUM 2 G/100ML
2 INJECTION, SOLUTION INTRAVENOUS
Status: COMPLETED | OUTPATIENT
Start: 2020-07-13 | End: 2020-07-13

## 2020-07-13 RX ORDER — AMIODARONE HYDROCHLORIDE 200 MG/1
400 TABLET ORAL DAILY
Status: DISCONTINUED | OUTPATIENT
Start: 2020-07-14 | End: 2020-07-15

## 2020-07-13 RX ORDER — DOBUTAMINE HYDROCHLORIDE 200 MG/100ML
INJECTION INTRAVENOUS CONTINUOUS PRN
Status: DISCONTINUED | OUTPATIENT
Start: 2020-07-13 | End: 2020-07-13

## 2020-07-13 RX ADMIN — ONDANSETRON 4 MG: 2 INJECTION INTRAMUSCULAR; INTRAVENOUS at 09:24

## 2020-07-13 RX ADMIN — MIDAZOLAM 1 MG: 1 INJECTION INTRAMUSCULAR; INTRAVENOUS at 08:28

## 2020-07-13 RX ADMIN — Medication 2 G: at 08:36

## 2020-07-13 RX ADMIN — AMIODARONE HYDROCHLORIDE 400 MG: 200 TABLET ORAL at 12:26

## 2020-07-13 RX ADMIN — MAGNESIUM SULFATE IN WATER 2 G: 40 INJECTION, SOLUTION INTRAVENOUS at 14:43

## 2020-07-13 RX ADMIN — SUGAMMADEX 200 MG: 100 INJECTION, SOLUTION INTRAVENOUS at 09:29

## 2020-07-13 RX ADMIN — TRIMETHOBENZAMIDE HYDROCHLORIDE 300 MG: 300 CAPSULE ORAL at 11:46

## 2020-07-13 RX ADMIN — DOBUTAMINE HYDROCHLORIDE 2.5 MCG/KG/MIN: 200 INJECTION INTRAVENOUS at 08:29

## 2020-07-13 RX ADMIN — Medication 10 MG: at 08:33

## 2020-07-13 RX ADMIN — ACETAMINOPHEN 650 MG: 325 TABLET, FILM COATED ORAL at 11:37

## 2020-07-13 RX ADMIN — Medication 20 MG: at 08:37

## 2020-07-13 RX ADMIN — HEPARIN SODIUM 950 UNITS/HR: 10000 INJECTION, SOLUTION INTRAVENOUS at 11:21

## 2020-07-13 RX ADMIN — PHENYLEPHRINE HYDROCHLORIDE 100 MCG: 10 INJECTION INTRAVENOUS at 08:37

## 2020-07-13 RX ADMIN — ROCURONIUM BROMIDE 50 MG: 10 INJECTION INTRAVENOUS at 08:29

## 2020-07-13 RX ADMIN — Medication 5 MG: at 08:32

## 2020-07-13 RX ADMIN — ATORVASTATIN CALCIUM 40 MG: 40 TABLET, FILM COATED ORAL at 12:26

## 2020-07-13 RX ADMIN — SODIUM CHLORIDE, POTASSIUM CHLORIDE, SODIUM LACTATE AND CALCIUM CHLORIDE: 600; 310; 30; 20 INJECTION, SOLUTION INTRAVENOUS at 08:24

## 2020-07-13 RX ADMIN — HYDRALAZINE HYDROCHLORIDE 75 MG: 25 TABLET, FILM COATED ORAL at 12:25

## 2020-07-13 RX ADMIN — Medication 5 MG: at 08:29

## 2020-07-13 RX ADMIN — PROPOFOL 20 MG: 10 INJECTION, EMULSION INTRAVENOUS at 08:36

## 2020-07-13 RX ADMIN — CHLORHEXIDINE GLUCONATE 0.12% ORAL RINSE 10 ML: 1.2 LIQUID ORAL at 08:05

## 2020-07-13 RX ADMIN — PROPOFOL 50 MG: 10 INJECTION, EMULSION INTRAVENOUS at 08:29

## 2020-07-13 RX ADMIN — ISOSORBIDE MONONITRATE 60 MG: 60 TABLET, EXTENDED RELEASE ORAL at 12:25

## 2020-07-13 RX ADMIN — FENTANYL CITRATE 100 MCG: 50 INJECTION, SOLUTION INTRAMUSCULAR; INTRAVENOUS at 08:29

## 2020-07-13 RX ADMIN — AMOXICILLIN AND CLAVULANATE POTASSIUM 1 TABLET: 875; 125 TABLET, FILM COATED ORAL at 19:39

## 2020-07-13 RX ADMIN — PHENYLEPHRINE HYDROCHLORIDE 100 MCG: 10 INJECTION INTRAVENOUS at 08:29

## 2020-07-13 RX ADMIN — FUROSEMIDE 40 MG: 10 INJECTION, SOLUTION INTRAMUSCULAR; INTRAVENOUS at 12:26

## 2020-07-13 RX ADMIN — Medication 5 MG: at 08:36

## 2020-07-13 RX ADMIN — HYDRALAZINE HYDROCHLORIDE 75 MG: 25 TABLET, FILM COATED ORAL at 19:38

## 2020-07-13 RX ADMIN — ASPIRIN 81 MG CHEWABLE TABLET 81 MG: 81 TABLET CHEWABLE at 12:25

## 2020-07-13 RX ADMIN — Medication 20 MG: at 08:29

## 2020-07-13 ASSESSMENT — MIFFLIN-ST. JEOR: SCORE: 1457

## 2020-07-13 ASSESSMENT — ACTIVITIES OF DAILY LIVING (ADL)
ADLS_ACUITY_SCORE: 12
ADLS_ACUITY_SCORE: 14
ADLS_ACUITY_SCORE: 15

## 2020-07-13 NOTE — BRIEF OP NOTE
[facility], [place]    Brief Operative Note    Pre-operative diagnosis: Dental caries [K02.9]  Post-operative diagnosis Same as pre-operative diagnosis    Procedure: Procedure(s):  EXTRACTION, TOOTH #11, 12, 13, 15, and 29  Surgeon: Surgeon(s) and Role:     * Monica Chao DDS - Primary     * Jair Perez MD - Resident - Assisting  Anesthesia: General via oral ETT  Estimated blood loss: 10 ml  Drains: None  Specimens: * No specimens in log *  Findings:   caries teeth #11, 12, 13, 15, 29, extracted, gel foam placed and sutured wuth 3-0 chromic.  Complications: None.  Implants: * No implants in log *      Plan:  Return to MICU  Pain control per primary team   OK for guaze in mouth PRN oozing  Peridex BID for one week  Mechanical soft diet for one week  If bleeding occurs bite down hard on guaze for 30 minutes  OK to resume anticoagulation  Patient is now optimized from a dental perspective to undergo cardiac procedures  No follow up with oral surgery required

## 2020-07-13 NOTE — OR NURSING
Device RN Tawanda notified of pt's dental procedure this am.   Single chamber ICD- ok to use magnet if use of cautery during procedure.

## 2020-07-13 NOTE — PLAN OF CARE
Denies discomfort; resp effortless/even. NPO at 2400 for AM OR~teeth extraction. Heparin gtt stopped at 0400 as ordered. CVP 12-13 per picc~per cards OC no need to draw VBGs ordered previously; also ordered 12units Lantus insulin at HS (25units ordered at HS) previously ordered) since will be NPO for OR. See flow sheets for other assessments. Will continue to closely monitor.

## 2020-07-13 NOTE — PLAN OF CARE
Major Shift Events:  Pt went to OR today for x5 teeth extractions. Pt tolerated procedure well. Pt now on mechanical soft diet for one week. Pt did develop a run of ventricular tachycardia around 1230 this afternoon and couplets of ectopy. Sx notified and electrolytes were drawn and 12 lead was done. Magnesium was replaced, but otherwise electrolytes were WDL.     Plan: Pt will tentatively get IABP placed tomorrow in preparation of LVAD procedure the following day.     For vital signs and complete assessments, please see documentation flowsheets.

## 2020-07-13 NOTE — ANESTHESIA POSTPROCEDURE EVALUATION
Anesthesia POST Procedure Evaluation    Patient: Lucian Henderson .   MRN:     [id number] Gender:   male   Age:    64 year old :      1955        Preoperative Diagnosis: Dental caries [K02.9]   Procedure(s):  EXTRACTION, TOOTH #11, 12, 13, 15, and 29   Postop Comments: No value filed.     Anesthesia Type: General       Disposition: Admission   Postop Pain Control: Uneventful            Sign Out: Well controlled pain   PONV: No   Neuro/Psych: Uneventful            Sign Out: Acceptable/Baseline neuro status   Airway/Respiratory: Uneventful            Sign Out: Acceptable/Baseline resp. status   CV/Hemodynamics: Uneventful            Sign Out: Acceptable CV status   Other NRE: NONE   DID A NON-ROUTINE EVENT OCCUR? No    Event details/Postop Comments:  I assessed the patient in PACU prior to discharge.  The patient was awake and alert with minimal to moderate pain which was well controlled with medications.  The patient denied any significant nausea.  The patient's heart rate and blood pressure with consistent with his preoperative measurements, and he was breathing comfortably without any signs of respiratory distress.  There were no readily apparent anesthetic complications.  All his questions were answered.         Last Anesthesia Record Vitals:  CRNA VITALS  2020 0910 - 2020 1010      2020             NIBP:  126/72    Ht Rate:  97    SpO2:  96 %          Last PACU Vitals:  Vitals Value Taken Time   /77 2020 12:00 PM   Temp 36.8  C (98.3  F) 2020 11:00 AM   Pulse 92 2020 12:00 PM   Resp 18 2020 12:14 PM   SpO2 98 % 2020 12:14 PM   Temp src     NIBP 126/72 2020  9:44 AM   Pulse     SpO2 96 % 2020  9:44 AM   Resp     Temp     Ht Rate 97 2020  9:44 AM   Temp 2     Vitals shown include unvalidated device data.      Electronically Signed By: Luciano Verduzco MD, 2020, 12:15 PM

## 2020-07-13 NOTE — OP NOTE
Oral & Maxillofacial Surgery Operative NOTE:     Procedure:   Simple extraction of teeth #11, 12, 13, 15, 29    Pre-Operative Diagnosis:   Caries, periodontitis    Post-Operative Diagnosis: Same     STAFF SURGEON: Dr. Monica Chao DDS  RESIDENT SURGEON: Esther Rodriguez DDS  :     ESTIMATED BLOOD LOSS:  10 cc    URINE OUTPUT: None    LOCAL ANESTHESIA: 4 mL total of 0.25% Marcaine with 1:200,000 epinephrine.     SPECIMENS: None.     COMPLICATIONS: None.     DRAINS: none    INDICATIONS FOR PROCEDURE: caries, periodontitis, optimization for cardiovascular surgery    DESCRIPTION OF PROCEDURE: The patient was met preoperatively and the treatment plan was confirmed with the patient.  He was brought back to operating room by the Anesthesia Service. She transferred herself to the table and was induced to general anesthesia. Following induction of anesthesia, an endotracheal tube was placed and taped  by the Anesthesia Service. The patient's arms were tucked and padded, and the oral cavity was prepped. A throat pack was placed. Local anesthesia was used intraorally via local infiltration and chlorhexidine rinse was used in the oral cavity. The oral cavity was suctioned. The face was painted in usual sterile fashion.  Surgeons stepped out to scrub and returned to don sterile gown and gloves. At that time, the surgical site was squared off and a split sheet drape was placed.     First attention to upper left quadrant and teeth #11, 12, 13, 15.  15 blade was used to make a sulcular incision.  FTMP flap was elevated.  #11, 12, 13, 15 were elevated and delivered with forceps.  No OA communicaiton.  Sites were irrigated, curetted.  3-0 cg suture was used to reapproximate the gingiva.  Next periosteal elevator was used to reflect the marginal gingiva at site #29.  #29 elevated and handpiece used to complete buccal trough and delivered with forceps.  Sockets curetted and irrigated sterile saline.       All  sites were palpated and found to be smooth with no buccal undercuts.      The patient's oral cavity was suctioned. The throat pack was removed.  The patient was turned over to the Anesthesia Service and was awakened in the OR and transferred stable to the PACU.  Dr Malave was present for the entirety of the procedure.    Complications: None.     Esther Rodriguez MD

## 2020-07-13 NOTE — PROGRESS NOTES
[facility]   Cardiology II Service / Advanced Heart Failure  Progress note    Lucian Morillo  : 1955  MRN # [id number]    ADMIT DATE: 7/3/2020    Changes today  - s/p tooth extractions   - hold diuretics today, likely transition to PO diuretics (lasix 40 mg PO BID)   - femoral IABP  and heart transplant listing  - 24 hours Augmentin for zander-op ppx given teeth removal and plan for IABP     ASSESSMENT & PLAN:  Lucian Henderson Sr. is a 64 year old male with a PMH of HTN, DMII, obesity, CKD, CHANTEL, ischemic cardiomyopathy and severe LV systolic dysfunction s/p 3V CABG () and primary prevention ICD ( 4/2/15). Currently he presents with 3 days of worsening fatigue and SOB with minimal exertion. Patient claims he could walk 1-2 blocks last week but has been barely able to ambulate home recently.     Admitted for possible LVAD/Transplant work up.    Date RA MPAP PCWP SV02 Jacy CO Jacy CI MAP SVR PVR Intervention   20 8 50 38 49 2.3 1.28 89 2400 5.2    20  6 48 28 35 4.0 2.1 69 1300 2.2 Nipride, PO afterload reduction    7/10 9 56/34 32 68 5.1 2.7 68 900 1.8 Nipride 1, PO afterload reduction    14 65/42/50 36 60 4.3 2.2 89 1100 1.8 Nipride 0.25, PO afterload reduction        #Atrial Flutter with RVR  Patient went into Atrial flutter with RVR with rates around 150 overnight on 2020 at around 2 am. 5 mg IV beta-blocker was administered but this significantly lowered the patient's blood pressure.  - 5 mg IV beta-blocker administered and heparin started on 20 over night when patient went into flutter.    - Stopped Lasix, 250 ml LR Bolus (20)  - IV Amiodarone bolus 150 , followed by 1mg/min infusion for 6h followed by 0.5mg/min, started amiodarone 400 mg PO BID  (will do BID dosing for ~ 5 days,  can go to daily dosing)   - Digoxin  mcg (20)    # ICM s/p 3V CABG () and primary prevention ICD ( 4/2/15)  #HFrEF Stage D, NYHA  "III-IV  # Ambulatory cardiogenic shock  # Arrhythmia: HF associated VT/VF  Currently he presents with 3 days of worsening fatigue and SOB with minimal exertion, cardiac cachexia and cold extremities. Admitted for possible LVAD/Transplant work up. - Echocardiogram showed an EF of 15% on 7/5/20  - Diuresis : lasix 40 mg IV BID. Changed to PO 7/13  - ASA 81 mg daily  - Atorvastatin 40 daily  - Hydralazine 75 TID, IMDUR 60 mg  - Stop losartan 7/12  - He has had a colonoscopy on 8/7/19    - IABP placement tentatively on Tuesday   - NPO at [facility] for dental extraction as part of pre-heart tx work-up     # DMII ( A1c: 8.3)   - Finger sticks  - Hypoglycemia protocol   - Moderate intensity sliding scale insulin       #CKD  - Monitor Scr and K Q12h   - Avoid nephrotoxic agents    Floor Care  Fluids: 1.5L fluid restriction  Food: 2 gram sodium diet, low fat diet  Electrolyte repletion: potassium/magnesium sliding scale  DVT ppx: Lovenox  Glycemic Control: sliding scale insulin   Lines: PICC   Consults: none  Code Status: full    Patient was discussed with attending physician, Dr. Anand.     Héctor Smith MD   Cardiology PGY-5  ..........................................................................................................................................................  Subjective:  SARITA overnight.   Reported doing well this morning. Denies SOB or chest pain.      PHYSICAL EXAM:  /61   Pulse 96   Temp 98.1  F (36.7  C) (Oral)   Resp 11   Ht 1.626 m (5' 4\")   Wt 76.6 kg (168 lb 14 oz)   SpO2 97%   BMI 28.99 kg/m    GENERAL: NAD  NECK: Supple and without lymphadenopathy. JVP 12 cm  CV: S1/S2 heard without murmur   RESPIRATORY: CTAB  GI: Soft and distended. No tenderness, rebound, guarding. No palpable organomegaly.   EXTREMITIES: Peripheral edema trace.   NEUROLOGIC: Alert and orientated x 3.   MUSCULOSKELETAL: No joint swelling or tenderness.   SKIN: No jaundice. No acute rashes or lesions.     ROS: "   12-pt ROS otherwise negative except as noted above    PMH:  Past Medical History:   Diagnosis Date     CAD (coronary artery disease)      CHF (congestive heart failure) (H)      CKD (chronic kidney disease), stage III (H)      Cortical cataract of both eyes      Diabetes (H)      Hyperlipidemia      Hypertension      Ischemic cardiomyopathy      Obesity      CHANTEL (obstructive sleep apnea)     occas cpap     Osteoarthritis        PSH:  Past Surgical History:   Procedure Laterality Date     C CABG, ARTERY-VEIN, THREE  02/2008     CATARACT IOL, RT/LT       COLONOSCOPY N/A 8/7/2019    Procedure: COLONOSCOPY, WITH POLYPECTOMY AND BIOPSY;  Surgeon: Chauncey Morataya MD;  Location:  GI     CV RIGHT HEART CATH N/A 3/25/2019    Procedure: CV RIGHT HEART CATH;  Surgeon: Moises Santos MD;  Location:  HEART CARDIAC CATH LAB     CV RIGHT HEART CATH N/A 7/10/2019    Procedure: CV RIGHT HEART CATH;  Surgeon: Jak Mccabe MD;  Location:  HEART CARDIAC CATH LAB     CV RIGHT HEART CATH N/A 7/8/2020    Procedure: Right Heart Cath with Leave In [place] already has ICU load;  Surgeon: Jak Mccabe MD;  Location:  HEART CARDIAC CATH LAB     IMPLANT AUTOMATIC IMPLANTABLE CARDIOVERTER DEFIBRILLATOR       PHACOEMULSIFICATION CLEAR CORNEA WITH STANDARD IOL, VITRECTOMY PARSPLANA 25 GAGUE, COMBINED Left 12/10/2019    Procedure: PHACOEMULSIFICATION, CATARACT, CLEAR CORNEAL INCISION APPROACH, W STD INTRAOCULAR LENS IMPLANT INSERT + VITRECTOMY BY PARS PLANA  USING 25-GAUGE INSTRUMENTS. ENDOLASER, INFUSION OF 20% SF6 GAS;  Surgeon: Triny Bunch MD;  [facility]     PICC INSERTION Right 07/11/2020    basilic 44 cm total        MEDICATIONS:  Prior to Admission Medications   Prescriptions Last Dose Informant Patient Reported? Taking?   aspirin 81 MG tablet  Self No No   Sig: Take 1 tablet (81 mg) by mouth daily   atorvastatin (LIPITOR) 40 MG tablet  Self No No   Sig: Take 1 tablet (40 mg) by mouth daily   blood  glucose (ACCU-CHEK SMARTVIEW) test strip  Self No No   Sig: USE TO TEST THREE TIMES DAILY AS DIRECTED   blood glucose (NO BRAND SPECIFIED) test strip  Self No No   Sig: Use to test blood sugar 2 times daily or as directed.   blood glucose monitoring (ACCU-CHEK FELIPE SMARTVIEW) meter device kit  Self No No   Sig: Use to test blood sugar 3-4 times daily, as directed.   blood glucose monitoring (ONE TOUCH DELICA) lancets  Self No No   Sig: Use to test blood sugars 2 times daily.   clopidogrel (PLAVIX) 75 MG tablet  Self No No   Sig: Take 1 tablet (75 mg) by mouth daily   exenatide ER (BYDUREON) 2 MG pen  Self No No   Sig: Inject 2 mg Subcutaneous every 7 days   furosemide (LASIX) 20 MG tablet  Self No No   Sig: Take 2 tablets (40 mg) by mouth 2 times daily   glimepiride (AMARYL) 4 MG tablet  Self No No   Sig: Take 1 tablet (4 mg) by mouth every morning (before breakfast)   hydrALAZINE (APRESOLINE) 25 MG tablet  Self No No   Sig: Take 1 tablet (25 mg) by mouth 3 times daily   insulin glargine (LANTUS SOLOSTAR) 100 UNIT/ML pen  Self No No   Sig: Take 8 units in the morning and 40 units in the evening   insulin pen needle (B-D U/F) 31G X 5 MM miscellaneous  Self No No   Si Units by Device route 2 times daily Use 2 pen needles daily or as directed.   isosorbide mononitrate (IMDUR) 60 MG 24 hr tablet  Self No No   Sig: Take 1 tablet (60 mg) by mouth daily   losartan (COZAAR) 50 MG tablet  Spouse/Significant Other No No   Sig: Take 1 tablet (50 mg) by mouth daily   nitroglycerin (NITROSTAT) 0.4 MG SL tablet  Self No No   Sig: Place 1 tablet (0.4 mg) under the tongue every 5 minutes as needed for chest pain If you are still having symptoms after 3 doses (15 minutes) call 911.   order for DME  Self No No   Sig: Auto CPAP 8-15 cmH20      Facility-Administered Medications Last Administration Doses Remaining   erythromycin (ROMYCIN) ophthalmic ointment None recorded    lidocaine (PF) (XYLOCAINE) 2 % injection 10 mL None  recorded 1           ALLERGIES:     Allergies   Allergen Reactions     No Known Drug Allergies        FAMILY HISTORY:  Family History   Problem Relation Age of Onset     Diabetes Brother      Diabetes Sister      Diabetes Sister      Macular Degeneration No family hx of      Glaucoma No family hx of      Myocardial Infarction No family hx of      Kidney Disease No family hx of        SOCIAL HISTORY:  Social History     Socioeconomic History     Marital status:      Spouse name: Not on file     Number of children: 4     Years of education: Not on file     Highest education level: Not on file   Occupational History     Occupation:      Employer: [employer]     Employer: RETIRED   Social Needs     Financial resource strain: Not on file     Food insecurity     Worry: Not on file     Inability: Not on file     Transportation needs     Medical: Not on file     Non-medical: Not on file   Tobacco Use     Smoking status: Never Smoker     Smokeless tobacco: Never Used     Tobacco comment: Never smoked; non-smoking household   Substance and Sexual Activity     Alcohol use: No     Alcohol/week: 0.0 standard drinks     Drug use: No     Sexual activity: Yes     Partners: Female   Lifestyle     Physical activity     Days per week: Not on file     Minutes per session: Not on file     Stress: Not on file   Relationships     Social connections     Talks on phone: Not on file     Gets together: Not on file     Attends Restorationism service: Not on file     Active member of club or organization: Not on file     Attends meetings of clubs or organizations: Not on file     Relationship status: Not on file     Intimate partner violence     Fear of current or ex partner: Not on file     Emotionally abused: Not on file     Physically abused: Not on file     Forced sexual activity: Not on file   Other Topics Concern     Parent/sibling w/ CABG, MI or angioplasty before 65F 55M? No   Social History Narrative     Not on file        LABS:  BMP  Recent Labs   Lab 07/13/20  0351 07/12/20  1621 07/12/20  0335 07/11/20  1717    136 138 135   POTASSIUM 4.1 3.7 3.8 4.2   CHLORIDE 103 106 105 105   CO2 26 26 25 24   BUN 36* 32* 29 31*   CR 1.87* 1.67* 1.77* 1.69*   * 163* 113* 194*   BRYANNA 9.2 8.3* 9.2 8.8     CBC  Recent Labs   Lab 07/13/20  0351 07/12/20  0335 07/11/20  0458 07/11/20  0331   WBC 7.1 7.4 7.1 Canceled, Test credited   HGB 12.1* 12.2* 11.4* Canceled, Test credited   HCT 38.6* 38.0* 35.8* Canceled, Test credited   MCV 96 97 97 Canceled, Test credited    219 189 Canceled, Test credited     INR  Recent Labs   Lab 07/13/20  0351   INR 1.05     IMAGING:    RHC 6/18/20     RA 20/26/18  RV 85/28  PA 82/45/58  PCWP 45  Cardiac output by Jacy: 3.85 L/min (indexed to 2.09 L/min/m2)  Cardiac output by thermodilution: 3.63 L/min (indexed to 1.97 L/min/m2)  SVR (by TD CO): 1123  PVR (by TD CO): 7.4    Angiogram 6/18/20   3 vessel CAD s/p 3V CABG in 2008 (LIMA-LAD, SVG-OM1, SVG-RPDA)  2. Known proximal SVG-RPDA occlusion  3. Presumed occlusion of SVG-OM1 (unable to locate on non-selective aortic root shot)  4. Patent LIMA-LAD with collateralization of LAD to PDA    Echocardiogram ( 3/11/2019)   Moderate to severe left ventricular dilation is present.  Severely (EF 10-20%) reduced left ventricular function is present.  No significant valve dysfunction.  Compared to study done 6/5/17 there is no significant change in LV size and  systolic function    Echocardiogram ( 7/5/2020)   Interpretation Summary  Severely (EF 10-20%) reduced left ventricular function is present. LVEF 15%  based on biplane 2D tracing. Severe left ventricular dilation is present.  LVIDd:6.8 cm. Grade III or advanced diastolic dysfunction.  The right ventricle is normal size.  Global right ventricular function is moderately reduced.  No significant valvular abnormalities were noted.  IVC diameter and respiratory changes fall into an intermediate  range  suggesting an RA pressure of 8 mmHg.  Mild pulmonary hypertension is present.     This study was compared with the study from 3/25/2019. RV function has  worsened, LV size and function appear similar.    Devices  ICD ([employer]- 4/2/2015 )

## 2020-07-13 NOTE — ANESTHESIA CARE TRANSFER NOTE
Patient: Lucian Henderson Sr.    Procedure(s):  EXTRACTION, TOOTH #11, 12, 13, 15, and 29    Diagnosis: Dental caries [K02.9]  Diagnosis Additional Information: No value filed.    Anesthesia Type:   No value filed.     Note:  Airway :Face Mask  Patient transferred to:PACU  Handoff Report: Identifed the Patient, Identified the Reponsible Provider, Reviewed the pertinent medical history, Discussed the surgical course, Reviewed Intra-OP anesthesia mangement and issues during anesthesia, Set expectations for post-procedure period and Allowed opportunity for questions and acknowledgement of understanding      Vitals: (Last set prior to Anesthesia Care Transfer)    CRNA VITALS  7/13/2020 0910 - 7/13/2020 0947      7/13/2020             NIBP:  126/72    Ht Rate:  97    SpO2:  96 %                Electronically Signed By: ELIJAH Resendez CRNA  July 13, 2020  9:47 AM

## 2020-07-13 NOTE — PROGRESS NOTES
Dr. Verduzco at bedside for sign out. He stated that Patient is good to go up stairs.  He was also notified about BG being 182, He did not want to correct in PACU

## 2020-07-14 ENCOUNTER — ANESTHESIA (OUTPATIENT)
Dept: SURGERY | Facility: CLINIC | Age: 65
DRG: 001 | End: 2020-07-14
Payer: COMMERCIAL

## 2020-07-14 LAB
ANION GAP SERPL CALCULATED.3IONS-SCNC: 7 MMOL/L (ref 3–14)
ANION GAP SERPL CALCULATED.3IONS-SCNC: 8 MMOL/L (ref 3–14)
BASE DEFICIT BLDV-SCNC: 1 MMOL/L
BASE EXCESS BLDV CALC-SCNC: 0.8 MMOL/L
BUN SERPL-MCNC: 36 MG/DL (ref 7–30)
BUN SERPL-MCNC: 37 MG/DL (ref 7–30)
CALCIUM SERPL-MCNC: 8.9 MG/DL (ref 8.5–10.1)
CALCIUM SERPL-MCNC: 8.9 MG/DL (ref 8.5–10.1)
CHLORIDE SERPL-SCNC: 104 MMOL/L (ref 94–109)
CHLORIDE SERPL-SCNC: 104 MMOL/L (ref 94–109)
CO2 SERPL-SCNC: 25 MMOL/L (ref 20–32)
CO2 SERPL-SCNC: 25 MMOL/L (ref 20–32)
CREAT SERPL-MCNC: 1.99 MG/DL (ref 0.66–1.25)
CREAT SERPL-MCNC: 2.06 MG/DL (ref 0.66–1.25)
ERYTHROCYTE [DISTWIDTH] IN BLOOD BY AUTOMATED COUNT: 15.7 % (ref 10–15)
GFR SERPL CREATININE-BSD FRML MDRD: 32 ML/MIN/{1.73_M2}
GFR SERPL CREATININE-BSD FRML MDRD: 34 ML/MIN/{1.73_M2}
GLUCOSE BLDC GLUCOMTR-MCNC: 123 MG/DL (ref 70–99)
GLUCOSE BLDC GLUCOMTR-MCNC: 142 MG/DL (ref 70–99)
GLUCOSE BLDC GLUCOMTR-MCNC: 151 MG/DL (ref 70–99)
GLUCOSE SERPL-MCNC: 129 MG/DL (ref 70–99)
GLUCOSE SERPL-MCNC: 159 MG/DL (ref 70–99)
HCO3 BLDV-SCNC: 25 MMOL/L (ref 21–28)
HCO3 BLDV-SCNC: 26 MMOL/L (ref 21–28)
HCT VFR BLD AUTO: 35.6 % (ref 40–53)
HGB BLD-MCNC: 11.4 G/DL (ref 13.3–17.7)
LMWH PPP CHRO-ACNC: 0.13 IU/ML
LMWH PPP CHRO-ACNC: 0.15 IU/ML
MAGNESIUM SERPL-MCNC: 2.4 MG/DL (ref 1.6–2.3)
MAGNESIUM SERPL-MCNC: 2.5 MG/DL (ref 1.6–2.3)
MCH RBC QN AUTO: 30.8 PG (ref 26.5–33)
MCHC RBC AUTO-ENTMCNC: 32 G/DL (ref 31.5–36.5)
MCV RBC AUTO: 96 FL (ref 78–100)
O2/TOTAL GAS SETTING VFR VENT: 21 %
O2/TOTAL GAS SETTING VFR VENT: 21 %
OXYHGB MFR BLDV: 63 %
OXYHGB MFR BLDV: 68 %
PCO2 BLDV: 44 MM HG (ref 40–50)
PCO2 BLDV: 45 MM HG (ref 40–50)
PH BLDV: 7.36 PH (ref 7.32–7.43)
PH BLDV: 7.38 PH (ref 7.32–7.43)
PLATELET # BLD AUTO: 247 10E9/L (ref 150–450)
PO2 BLDV: 36 MM HG (ref 25–47)
PO2 BLDV: 39 MM HG (ref 25–47)
POTASSIUM SERPL-SCNC: 4.4 MMOL/L (ref 3.4–5.3)
POTASSIUM SERPL-SCNC: 4.6 MMOL/L (ref 3.4–5.3)
RBC # BLD AUTO: 3.7 10E12/L (ref 4.4–5.9)
SODIUM SERPL-SCNC: 137 MMOL/L (ref 133–144)
SODIUM SERPL-SCNC: 137 MMOL/L (ref 133–144)
WBC # BLD AUTO: 7.7 10E9/L (ref 4–11)

## 2020-07-14 PROCEDURE — 27210794 ZZH OR GENERAL SUPPLY STERILE: Performed by: INTERNAL MEDICINE

## 2020-07-14 PROCEDURE — 25000128 H RX IP 250 OP 636: Performed by: STUDENT IN AN ORGANIZED HEALTH CARE EDUCATION/TRAINING PROGRAM

## 2020-07-14 PROCEDURE — 25000132 ZZH RX MED GY IP 250 OP 250 PS 637: Performed by: SURGERY

## 2020-07-14 PROCEDURE — 93451 RIGHT HEART CATH: CPT | Performed by: INTERNAL MEDICINE

## 2020-07-14 PROCEDURE — 80048 BASIC METABOLIC PNL TOTAL CA: CPT | Performed by: INTERNAL MEDICINE

## 2020-07-14 PROCEDURE — 25000132 ZZH RX MED GY IP 250 OP 250 PS 637: Performed by: STUDENT IN AN ORGANIZED HEALTH CARE EDUCATION/TRAINING PROGRAM

## 2020-07-14 PROCEDURE — 33967 INSERT I-AORT PERCUT DEVICE: CPT | Performed by: INTERNAL MEDICINE

## 2020-07-14 PROCEDURE — 25000132 ZZH RX MED GY IP 250 OP 250 PS 637: Performed by: INTERNAL MEDICINE

## 2020-07-14 PROCEDURE — 99153 MOD SED SAME PHYS/QHP EA: CPT | Performed by: INTERNAL MEDICINE

## 2020-07-14 PROCEDURE — 40000281 ZZH STATISTIC TRANSPORT TIME EA 15 MIN

## 2020-07-14 PROCEDURE — 25000125 ZZHC RX 250: Performed by: INTERNAL MEDICINE

## 2020-07-14 PROCEDURE — 40000275 ZZH STATISTIC RCP TIME EA 10 MIN

## 2020-07-14 PROCEDURE — 20000004 ZZH R&B ICU UMMC

## 2020-07-14 PROCEDURE — 85027 COMPLETE CBC AUTOMATED: CPT | Performed by: INTERNAL MEDICINE

## 2020-07-14 PROCEDURE — 4A023N6 MEASUREMENT OF CARDIAC SAMPLING AND PRESSURE, RIGHT HEART, PERCUTANEOUS APPROACH: ICD-10-PCS | Performed by: INTERNAL MEDICINE

## 2020-07-14 PROCEDURE — 25000128 H RX IP 250 OP 636: Performed by: SURGERY

## 2020-07-14 PROCEDURE — 40000076 ZZH STATISTIC IABP MONITORING

## 2020-07-14 PROCEDURE — 40000081 ZZH STATISTIC INSERT IABP

## 2020-07-14 PROCEDURE — 99223 1ST HOSP IP/OBS HIGH 75: CPT | Performed by: INTERNAL MEDICINE

## 2020-07-14 PROCEDURE — 99233 SBSQ HOSP IP/OBS HIGH 50: CPT | Mod: 25 | Performed by: INTERNAL MEDICINE

## 2020-07-14 PROCEDURE — 27210305 ZZH CATH BALLOON IABP

## 2020-07-14 PROCEDURE — 40000048 ZZH STATISTIC DAILY SWAN MONITORING

## 2020-07-14 PROCEDURE — 85520 HEPARIN ASSAY: CPT | Performed by: INTERNAL MEDICINE

## 2020-07-14 PROCEDURE — 25000128 H RX IP 250 OP 636: Performed by: INTERNAL MEDICINE

## 2020-07-14 PROCEDURE — 00000146 ZZHCL STATISTIC GLUCOSE BY METER IP

## 2020-07-14 PROCEDURE — 40000196 ZZH STATISTIC RAPCV CVP MONITORING

## 2020-07-14 PROCEDURE — 5A02210 ASSISTANCE WITH CARDIAC OUTPUT USING BALLOON PUMP, CONTINUOUS: ICD-10-PCS | Performed by: INTERNAL MEDICINE

## 2020-07-14 PROCEDURE — 93451 RIGHT HEART CATH: CPT | Mod: 26 | Performed by: INTERNAL MEDICINE

## 2020-07-14 PROCEDURE — 85027 COMPLETE CBC AUTOMATED: CPT | Performed by: STUDENT IN AN ORGANIZED HEALTH CARE EDUCATION/TRAINING PROGRAM

## 2020-07-14 PROCEDURE — 99152 MOD SED SAME PHYS/QHP 5/>YRS: CPT | Performed by: INTERNAL MEDICINE

## 2020-07-14 PROCEDURE — 83735 ASSAY OF MAGNESIUM: CPT | Performed by: INTERNAL MEDICINE

## 2020-07-14 PROCEDURE — 85520 HEPARIN ASSAY: CPT | Performed by: THORACIC SURGERY (CARDIOTHORACIC VASCULAR SURGERY)

## 2020-07-14 PROCEDURE — 82805 BLOOD GASES W/O2 SATURATION: CPT | Performed by: INTERNAL MEDICINE

## 2020-07-14 RX ORDER — HEPARIN SODIUM 10000 [USP'U]/100ML
0-3500 INJECTION, SOLUTION INTRAVENOUS CONTINUOUS
Status: DISCONTINUED | OUTPATIENT
Start: 2020-07-14 | End: 2020-07-18

## 2020-07-14 RX ORDER — NALOXONE HYDROCHLORIDE 0.4 MG/ML
.1-.4 INJECTION, SOLUTION INTRAMUSCULAR; INTRAVENOUS; SUBCUTANEOUS
Status: DISCONTINUED | OUTPATIENT
Start: 2020-07-14 | End: 2020-07-20

## 2020-07-14 RX ORDER — SODIUM CHLORIDE 9 MG/ML
INJECTION, SOLUTION INTRAVENOUS CONTINUOUS
Status: DISCONTINUED | OUTPATIENT
Start: 2020-07-14 | End: 2020-07-15 | Stop reason: CLARIF

## 2020-07-14 RX ORDER — FENTANYL CITRATE 50 UG/ML
INJECTION, SOLUTION INTRAMUSCULAR; INTRAVENOUS
Status: DISCONTINUED | OUTPATIENT
Start: 2020-07-14 | End: 2020-07-14 | Stop reason: HOSPADM

## 2020-07-14 RX ADMIN — AMIODARONE HYDROCHLORIDE 400 MG: 200 TABLET ORAL at 08:18

## 2020-07-14 RX ADMIN — ASPIRIN 81 MG CHEWABLE TABLET 81 MG: 81 TABLET CHEWABLE at 08:18

## 2020-07-14 RX ADMIN — ATORVASTATIN CALCIUM 40 MG: 40 TABLET, FILM COATED ORAL at 08:18

## 2020-07-14 RX ADMIN — ISOSORBIDE MONONITRATE 60 MG: 60 TABLET, EXTENDED RELEASE ORAL at 08:18

## 2020-07-14 RX ADMIN — HYDRALAZINE HYDROCHLORIDE 75 MG: 25 TABLET, FILM COATED ORAL at 08:18

## 2020-07-14 RX ADMIN — HYDRALAZINE HYDROCHLORIDE 75 MG: 25 TABLET, FILM COATED ORAL at 13:48

## 2020-07-14 RX ADMIN — AMOXICILLIN AND CLAVULANATE POTASSIUM 1 TABLET: 875; 125 TABLET, FILM COATED ORAL at 08:19

## 2020-07-14 RX ADMIN — HYDRALAZINE HYDROCHLORIDE 75 MG: 25 TABLET, FILM COATED ORAL at 19:37

## 2020-07-14 RX ADMIN — HEPARIN SODIUM 950 UNITS/HR: 10000 INJECTION, SOLUTION INTRAVENOUS at 18:38

## 2020-07-14 RX ADMIN — ACETAMINOPHEN 650 MG: 325 TABLET, FILM COATED ORAL at 11:55

## 2020-07-14 ASSESSMENT — ACTIVITIES OF DAILY LIVING (ADL)
ADLS_ACUITY_SCORE: 14

## 2020-07-14 ASSESSMENT — MIFFLIN-ST. JEOR: SCORE: 1454.86

## 2020-07-14 NOTE — PROGRESS NOTES
ORAL & MAXILLOFACIAL SURGERY  POST-OP NOTE:  Lucian Henderson .,  MRN: [id number],  : 1955         HPI:  64 year old male with history of caries and periodontitis s/p extraction teeth #11, 12, 13, 15, 29. Procedure went well and without complication.    Patient reports that he is feeling well, his pain appears adequately managed.    Intraorally sutures in place, hemostatic, minimal to no swelling.      Recs:   -Return to MICU  -Pain control per primary team   -OK for guaze in mouth PRN oozing  -Peridex BID for one week  -Mechanical soft diet for one week  I-f bleeding occurs bite down hard on guaze for 30 minutes  -OK to resume anticoagulation  -Patient is now optimized from a dental perspective to undergo cardiac procedures  -No follow up with oral surgery required         Esther Rodriguez DDS  Oral & Maxillofacial Surgery, PGY-1    Please page the resident on call with any questions or concerns.

## 2020-07-14 NOTE — CONSULTS
ORAL & MAXILLOFACIAL SURGERY   CONSULT  Lucian Henderson Sr.,  MRN: [id number],  : 1955        ASSESSMENT   64 year old male presents with caries and periodontitis associated with teeth #11, 12, 13 and 15 and fractured tooth #29 before cardiovascular surgery, requested dental optimization before surgery     PLAN  Examination and extraction of teeth #11, 12, 13, 15 and 29 and any other indicated teeth in the operating room      Please contact the OMFS resident on-call with questions or concerns.    Discussed with chief resident and staff.    Esther Rodriguez DDS  Oral & Maxillofacial Surgery, PGY-1    ____________________________________________      HPI  64 year old male with a PMH of HTN, DMII, obesity, CKD, CHANTEL, ischemic cardiomyopathy and severe LV systolic dysfunction s/p 3V CABG () and primary prevention ICD ( 4/2/15), patient planned for possible LVAD/transplant and patients cardiac team is requesting dental optimization, patient has caries and periodontitis in UL and fractured tooth LR    HISTORY  Past Medical History:   Past Medical History:   Diagnosis Date     CAD (coronary artery disease)      CHF (congestive heart failure) (H)      CKD (chronic kidney disease), stage III (H)      Cortical cataract of both eyes      Diabetes (H)      Hyperlipidemia      Hypertension      Ischemic cardiomyopathy      Obesity      CHANTEL (obstructive sleep apnea)     occas cpap     Osteoarthritis      Past Surgical History:   Past Surgical History:   Procedure Laterality Date     C CABG, ARTERY-VEIN, THREE  2008     CATARACT IOL, RT/LT       COLONOSCOPY N/A 2019    Procedure: COLONOSCOPY, WITH POLYPECTOMY AND BIOPSY;  Surgeon: Chauncey Morataya MD;  Location:  GI     CV RIGHT HEART CATH N/A 3/25/2019    Procedure: CV RIGHT HEART CATH;  Surgeon: Moises Santos MD;  Location:  HEART CARDIAC CATH LAB     CV RIGHT HEART CATH N/A 7/10/2019    Procedure: CV RIGHT HEART CATH;  Surgeon: Estelle  Jak Barclay MD;  Location:  HEART CARDIAC CATH LAB     CV RIGHT HEART CATH N/A 7/8/2020    Procedure: Right Heart Cath with Leave In [place] already has ICU load;  Surgeon: Jak Mccabe MD;  Location:  HEART CARDIAC CATH LAB     IMPLANT AUTOMATIC IMPLANTABLE CARDIOVERTER DEFIBRILLATOR       PHACOEMULSIFICATION CLEAR CORNEA WITH STANDARD IOL, VITRECTOMY PARSPLANA 25 GAGUE, COMBINED Left 12/10/2019    Procedure: PHACOEMULSIFICATION, CATARACT, CLEAR CORNEAL INCISION APPROACH, W STD INTRAOCULAR LENS IMPLANT INSERT + VITRECTOMY BY PARS PLANA  USING 25-GAUGE INSTRUMENTS. ENDOLASER, INFUSION OF 20% SF6 GAS;  Surgeon: Triny Bunch MD;  [facility]     PICC INSERTION Right 07/11/2020    basilic 44 cm total      Medications:   sodium chloride (PF) 0.9% PF flush 10 mL    aspirin 81 MG tablet, Take 1 tablet (81 mg) by mouth daily  atorvastatin (LIPITOR) 40 MG tablet, Take 1 tablet (40 mg) by mouth daily  blood glucose (ACCU-CHEK SMARTVIEW) test strip, USE TO TEST THREE TIMES DAILY AS DIRECTED  blood glucose (NO BRAND SPECIFIED) test strip, Use to test blood sugar 2 times daily or as directed.  blood glucose monitoring (ACCU-CHEK FELIPE SMARTVIEW) meter device kit, Use to test blood sugar 3-4 times daily, as directed.  blood glucose monitoring (ONE TOUCH DELICA) lancets, Use to test blood sugars 2 times daily.  clopidogrel (PLAVIX) 75 MG tablet, Take 1 tablet (75 mg) by mouth daily  exenatide ER (BYDUREON) 2 MG pen, Inject 2 mg Subcutaneous every 7 days  furosemide (LASIX) 20 MG tablet, Take 2 tablets (40 mg) by mouth 2 times daily  glimepiride (AMARYL) 4 MG tablet, Take 1 tablet (4 mg) by mouth every morning (before breakfast)  hydrALAZINE (APRESOLINE) 25 MG tablet, Take 1 tablet (25 mg) by mouth 3 times daily  insulin glargine (LANTUS SOLOSTAR) 100 UNIT/ML pen, Take 8 units in the morning and 40 units in the evening  insulin pen needle (B-D U/F) 31G X 5 MM miscellaneous, 1 Units by Device route 2 times  daily Use 2 pen needles daily or as directed.  isosorbide mononitrate (IMDUR) 60 MG 24 hr tablet, Take 1 tablet (60 mg) by mouth daily  losartan (COZAAR) 50 MG tablet, Take 1 tablet (50 mg) by mouth daily  nitroglycerin (NITROSTAT) 0.4 MG SL tablet, Place 1 tablet (0.4 mg) under the tongue every 5 minutes as needed for chest pain If you are still having symptoms after 3 doses (15 minutes) call 911.  order for DME, Auto CPAP 8-15 cmH20         [START ON 7/14/2020] amiodarone  400 mg Oral Daily     amoxicillin-clavulanate  1 tablet Oral Q12H MUSA     aspirin  81 mg Oral Daily     atorvastatin  40 mg Oral Daily     heparin lock flush  5-10 mL Intracatheter Q24H     hydrALAZINE  75 mg Oral TID     insulin aspart  1-10 Units Subcutaneous TID AC     insulin aspart  1-7 Units Subcutaneous At Bedtime     insulin glargine  12 Units Subcutaneous At Bedtime     isosorbide mononitrate  60 mg Oral Daily     sodium chloride (PF)  3 mL Intracatheter Q8H     Allergies:   Allergies   Allergen Reactions     No Known Drug Allergies      Social History:   Social History     Socioeconomic History     Marital status:      Spouse name: Not on file     Number of children: 4     Years of education: Not on file     Highest education level: Not on file   Occupational History     Occupation:      Employer: [employer]     Employer: RETIRED   Social Needs     Financial resource strain: Not on file     Food insecurity     Worry: Not on file     Inability: Not on file     Transportation needs     Medical: Not on file     Non-medical: Not on file   Tobacco Use     Smoking status: Never Smoker     Smokeless tobacco: Never Used     Tobacco comment: Never smoked; non-smoking household   Substance and Sexual Activity     Alcohol use: No     Alcohol/week: 0.0 standard drinks     Drug use: No     Sexual activity: Yes     Partners: Female   Lifestyle     Physical activity     Days per week: Not on file     Minutes per session: Not  on file     Stress: Not on file   Relationships     Social connections     Talks on phone: Not on file     Gets together: Not on file     Attends Synagogue service: Not on file     Active member of club or organization: Not on file     Attends meetings of clubs or organizations: Not on file     Relationship status: Not on file     Intimate partner violence     Fear of current or ex partner: Not on file     Emotionally abused: Not on file     Physically abused: Not on file     Forced sexual activity: Not on file   Other Topics Concern     Parent/sibling w/ CABG, MI or angioplasty before 65F 55M? No   Social History Narrative     Not on file       REVIEW OF SYSTEMS  ROS reviewed and negative aside from listed in HPI    PHYSICAL EXAM  Vital Signs:   Vitals:    07/13/20 1815 07/13/20 1830 07/13/20 1900 07/13/20 2000   BP:   106/58    BP Location:       Pulse:   94    Resp: 20 19 23    Temp:    97.8  F (36.6  C)   TempSrc:    Oral   SpO2: 95% 95% 96%    Weight:       Height:           GEN: WD/WN male, NAD  HEENT: NC/AT, EOMI, PERRL, caries periodontitis #11, 12, 13, 15, 29  CV: ischemic cardiomyopathy and severe LV systolic dysfunction  PULM: CTAB, breathing comfortably on room air  GI: Soft, ND/NT  MSK: WEBB, no peripheral extremity edema  NEURO: AAOx4, CN II-XII intact bilaterally  PSYCH: Appropriate mood and affect            REVIEW OF LABORATORY DATA  Reviewed    Lab Results   Component Value Date    WBC 7.1 07/13/2020     Lab Results   Component Value Date    RBC 4.02 07/13/2020     Lab Results   Component Value Date    HGB 12.1 07/13/2020     Lab Results   Component Value Date    HCT 38.6 07/13/2020     No components found for: MCT  Lab Results   Component Value Date    MCV 96 07/13/2020     Lab Results   Component Value Date    MCH 30.1 07/13/2020     Lab Results   Component Value Date    MCHC 31.3 07/13/2020     Lab Results   Component Value Date    RDW 15.6 07/13/2020     Lab Results   Component Value Date    PLT  251 07/13/2020         IMAGING RESULTS (Include outside hospital results)     Reviewed

## 2020-07-14 NOTE — PROGRESS NOTES
Pre-LVAD/Transplant Social Work Services Progress Note      Date of Initial Social Work Evaluation: 7/9/2019, 7/6/2020  Collaborated with: Pt and his wife    Data: Pt scheduled for balloon pump this afternoon with anticipation of being listed for heart transplant. Met w/ pt and his wife briefly, as they had completed HCD. Pt has no concerns with his wife making medical decisions but wanted to complete document. Reviewed document and appears complete. Difficult to schedule a time for a video notary pending need to go to the OR this afternoon. Writer will stop by again tomorrow and schedule a notary if there are no conflicting tests.       Intervention: Supportive Visit, HCD   Assessment: Pt and wife pleasant. Pt expressed being nervous about balloon pump but eager to proceed.   Education provided by SW: Ongoing Social Work support, HCD   Plan:    Discharge Plans in Progress: None     Barriers to d/c plan: Medical Stability     Follow up Plan: SW to f/u tomorrow to help pt complete HCD.

## 2020-07-14 NOTE — CONSULTS
"[facility]  Palliative Care Consultation Note    Patient: Lucian Henderson Sr.  Date of Admission:  7/3/2020    Requesting Clinician / Team: cards 2  Reason for consult: Decisional support; ACP 2/2 LVAD    Recommendations:    I had a routine LVAD preparedness discussion with him and his wife today. Of note, we had a similar discussion with him a year ago in palliative clinic 7/2019. Per our meeting then, neuropsych eval from around that time, and meeting today, it is apparent his health literacy is low, aggravated by need for language interpretation; but overall he appears grossly informed about the LVAD and its day to day care needs. Shivani his wife will be his main caregiver and appeared well informed too. They were unable to repeat back to me any major complications that can occur apart from Shivani discussing 'bleeding.' I had an explicit d/w them about intracranial bleeding and chronic critical illness and the fact that Shivani would probably have to make decisions then and the need to prepare her for any such decisions as much as possible. I think they understood globally what I was discussing. He was tearful, congruently. He is clearly not a 'details anitha' and struggles with those. He was able to tell methat if he was profoundly disabled, unable to care for his body, he would not want to be kept alive. He expressed a big worry of burdening his family. He used the term \"if I am a vegetable\" and I clarified that meant bedbound and unable to care for his body. Shivani participated in this discussion and asked questions. Otherwise he seems motivated and ready to proceed with and LVAD. He expresses hope he can get a transplant and asks me if he can and I told him I wasn't sure what his heart doctors thought yet about that but I know they are evaluating that potential. I tried to emphasize for him that most patients with LVADs do well, have markedly improved quality of " life and longevity, but that it is also important to be prepared for all possibilities.     Patient d/w Dr Anand    We will sign off; please reconsult us if his condition changes and we can be of further help      Thank you for the opportunity to participate in the care of this patient and family.      During regular M-F work hours -- if you are not sure who specifically to contact -- please contact us by sending a text page to our team consult pager at [phone].    After regular work hours and on weekends/holidays, you can call our answering service at [phone]. Also, who's on call for us is available in [employer].     70 min on unit today over half counseling with patient/wife about ACP in context of LVAD, transplant as above.  Saurabh Ramirez MD / Palliative Medicine / Mobile [phone] - texts, without PHI, preferred / My clinic is in the [facility]: [phone] (scheduling); [phone] (triage). [facility] Inpatient Team Consult Pager [phone] (answered 8am-430pm M-F) - prefer text pages via [employer] / Palliative After-Hours Answering Service [phone].           Assessments:  Lucian Oliveira Santiago Marks is a 64 year old male with CAD & a severe ischemic cardiomyopathy admitted with ambulatory cardiac shock, for transplant/LVAD work up. Notably, we met him in our clinic nearly exactly a year ago as part of a transplant work up then.     Today, the patient was seen for:  Cardiogenic shock  Ischemic cardiomyopathy    Prognosis, Goals, & Planning:      Functional Status just prior to hospitalization: 2 (Ambulatory and capable of all selfcare but unable to carry out any work activities; may need help with IADLs up and about > 50% of waking hours)      Prognosis, Goals, and/or Advance Care Planning were addressed today: Yes        Summary/Comments:       Patient's decision making preferences: shared with support from loved ones          Patient has decision-making capacity today for complex  decisions: Yes            I have concerns about the patient/family's health literacy today: Yes           Patient has a completed Health Care Directive: No.  LVAD SW has been discussing this with him and he knows about them and plans on completing today      Code status: full code    Coping, Meaning, & Spirituality:   Mood, coping, and/or meaning in the context of serious illness were addressed today: Yes  Summary/Comments: Has dtr and sons in region too.     Social:     Lives with wife. Adult children in area; [place]. [place] immigrant, here about 7 y    History of Present Illness:  History gathered today from: patient, family/loved ones, medical chart, medical team members    Admitted with worsening SOB and ALBRIGHT for stabilization and LVAD/TXP work up.  Has a Inez medranok CI has improved from 1.28 to 2.2 with a few days of nipride and oral afterload reduction, SVR decreasing. Had teeth extracted. He has CKD eGFR 30s-40s; overall stable here.     Last year during his eval neuropsych eval showed some degree of cognitive impairment    Explicit, LVAD ACP discussion as summarized above    No physical c/o today    ROS:  unremarkable     Past Medical History:  Past Medical History:   Diagnosis Date     CAD (coronary artery disease)      CHF (congestive heart failure) (H)      CKD (chronic kidney disease), stage III (H)      Cortical cataract of both eyes      Diabetes (H)      Hyperlipidemia      Hypertension      Ischemic cardiomyopathy      Obesity      CHANTEL (obstructive sleep apnea)     occas cpap     Osteoarthritis         Past Surgical History:  Past Surgical History:   Procedure Laterality Date     C CABG, ARTERY-VEIN, THREE  02/2008     CATARACT IOL, RT/LT       COLONOSCOPY N/A 8/7/2019    Procedure: COLONOSCOPY, WITH POLYPECTOMY AND BIOPSY;  Surgeon: Chauncey Morataya MD;  [facility]     CV RIGHT HEART CATH N/A 3/25/2019    Procedure: CV RIGHT HEART CATH;  Surgeon: Moises Santos MD;  Location:   HEART CARDIAC CATH LAB     CV RIGHT HEART CATH N/A 7/10/2019    Procedure: CV RIGHT HEART CATH;  Surgeon: Jak Mccabe MD;  Location:  HEART CARDIAC CATH LAB     CV RIGHT HEART CATH N/A 7/8/2020    Procedure: Right Heart Cath with Leave In [place] already has ICU load;  Surgeon: Jak Mccabe MD;  Location:  HEART CARDIAC CATH LAB     IMPLANT AUTOMATIC IMPLANTABLE CARDIOVERTER DEFIBRILLATOR       PHACOEMULSIFICATION CLEAR CORNEA WITH STANDARD IOL, VITRECTOMY PARSPLANA 25 GAGUE, COMBINED Left 12/10/2019    Procedure: PHACOEMULSIFICATION, CATARACT, CLEAR CORNEAL INCISION APPROACH, W STD INTRAOCULAR LENS IMPLANT INSERT + VITRECTOMY BY PARS PLANA  USING 25-GAUGE INSTRUMENTS. ENDOLASER, INFUSION OF 20% SF6 GAS;  Surgeon: Triny Bunch MD;  [facility]     PICC INSERTION Right 07/11/2020    basilic 44 cm total          Family History:  Family History   Problem Relation Age of Onset     Diabetes Brother      Diabetes Sister      Diabetes Sister      Macular Degeneration No family hx of      Glaucoma No family hx of      Myocardial Infarction No family hx of      Kidney Disease No family hx of          Allergies:  Allergies   Allergen Reactions     No Known Drug Allergies         Medications:  I have reviewed this patient's medication profile and medications from this hospitalization.       Physical Exam:  Vital Signs: Temp: 98  F (36.7  C) Temp src: Axillary BP: 104/67 Pulse: 92 Heart Rate: 92 Resp: 26 SpO2: 96 % O2 Device: None (Room air) Oxygen Delivery: 3 LPM  Weight: 168 lbs 6.4 oz   Alert man in NAD sitting up in chair  Affect full, pleasant, interactive, clear sensorium, fluent speech  resps unlabored    Data reviewed:  Recent imaging reviewed, my comments on pertinents:   Echo LVEF 15%, severe LV dilation. RV function worsening.     CT Head nothing acute    Recent lab data reviewed, my comments on pertinents:   Cr 1.9  Na 137

## 2020-07-14 NOTE — PROGRESS NOTES
Status 2 Heart Listing 07/14/20    Lucian Dalearza SrMerly was approved for activation on the heart transplant waitlist by the Heart Transplant Committee.      This patient qualifies for Status 2 Listing based on the following criteria:    IABP with SBP <90, wedge >15 and C.I. <1.8 7 days prior to IABP insertion.    Coordinator reviewed listing criteria with Dr. Anand prior to submission of Status 2 form in [facility].     Insurance Approval:  YES (verify prior to listing with PFR)  ABO verification complete:  YES  2nd ABO verified by:  Briana Inman RN    On-call transplant coordinators, transplant LPN, immunology lab and PFR notified of listing.  Patient's primary cardiologist and/or attending physician is in agreement with this plan.      Status 2 Extension due 07/28/20

## 2020-07-14 NOTE — PROGRESS NOTES
[facility]   Cardiology II Service / Advanced Heart Failure  Progress note    Lucian Morillo  : 1955  MRN # [id number]    ADMIT DATE: 7/3/2020    Changes today  - IABP and leave in swan today, heart transplant listing   - hold diuretics this morning, readdress based on [facility] numbers     ASSESSMENT & PLAN:  Lucian Henderson Sr. is a 64 year old male with a PMH of HTN, DMII, obesity, CKD, CHANTEL, ischemic cardiomyopathy and severe LV systolic dysfunction s/p 3V CABG () and primary prevention ICD ( 4/2/15). Currently he presents with 3 days of worsening fatigue and SOB with minimal exertion. Patient claims he could walk 1-2 blocks last week but has been barely able to ambulate home recently.     Admitted for possible LVAD/Transplant work up.    Date RA MPAP PCWP SV02 Jacy CO Jacy CI MAP SVR PVR Intervention   20 8 50 38 49 2.3 1.28 89 2400 5.2    20  6 48 28 35 4.0 2.1 69 1300 2.2 Nipride, PO afterload reduction    7/10 9 56/34 32 68 5.1 2.7 68 900 1.8 Nipride 1, PO afterload reduction    14 65/42/50 36 60 4.3 2.2 89 1100 1.8 Nipride 0.25, PO afterload reduction        #Atrial Flutter with RVR  Patient went into Atrial flutter with RVR with rates around 150 overnight on 2020 at around 2 am. 5 mg IV beta-blocker was administered but this significantly lowered the patient's blood pressure.  - 5 mg IV beta-blocker administered and heparin started on 20 over night when patient went into flutter.    - Stopped Lasix, 250 ml LR Bolus (20)  - IV Amiodarone bolus 150 , followed by 1mg/min infusion for 6h followed by 0.5mg/min, started amiodarone 400 mg PO BID  (will do BID dosing for ~ 5 days,  can go to daily dosing)   - Digoxin  mcg (20)    # ICM s/p 3V CABG () and primary prevention ICD ( 4/2/15)  #HFrEF Stage D, NYHA III-IV  # Ambulatory cardiogenic shock  # Arrhythmia: HF associated VT/VF  Currently he presents with 3 days of  "worsening fatigue and SOB with minimal exertion, cardiac cachexia and cold extremities. Admitted for possible LVAD/Transplant work up. - Echocardiogram showed an EF of 15% on 7/5/20  - Diuresis : lasix 40 mg IV BID. Changed to PO 7/13  - ASA 81 mg daily  - Atorvastatin 40 daily  - Hydralazine 75 TID, IMDUR 60 mg  - Stop losartan 7/12  - He has had a colonoscopy on 8/7/19    - IABP placement tentatively on Tuesday   - NPO at [facility] for dental extraction as part of pre-heart tx work-up   - s/p tooth extractions 7/13  - 24 hours Augmentin for zander-op ppx given teeth removal and plan for IABP     # DMII ( A1c: 8.3)   - Finger sticks  - Hypoglycemia protocol   - Moderate intensity sliding scale insulin       #CKD  - Monitor Scr and K Q12h   - Avoid nephrotoxic agents    Floor Care  Fluids: 1.5L fluid restriction  Food: 2 gram sodium diet, low fat diet  Electrolyte repletion: potassium/magnesium sliding scale  DVT ppx: Lovenox  Glycemic Control: sliding scale insulin   Lines: PICC   Consults: none  Code Status: full    Patient was discussed with attending physician, Dr. Anand.     Héctor Smith MD   Cardiology PGY-5  ..........................................................................................................................................................  Subjective:  SARITA overnight.   Reported doing well this morning. Denies SOB or chest pain.  Oozing from mouth.     PHYSICAL EXAM:  /56   Pulse 93   Temp 99.1  F (37.3  C) (Oral)   Resp 27   Ht 1.626 m (5' 4\")   Wt 76.6 kg (168 lb 14 oz)   SpO2 95%   BMI 28.99 kg/m    GENERAL: NAD  NECK: Supple and without lymphadenopathy. JVP 12 cm  CV: S1/S2 heard without murmur   RESPIRATORY: CTAB  GI: Soft and distended. No tenderness, rebound, guarding. No palpable organomegaly.   EXTREMITIES: Peripheral edema trace.   NEUROLOGIC: Alert and orientated x 3.   MUSCULOSKELETAL: No joint swelling or tenderness.   SKIN: No jaundice. No acute rashes or " lesions.     ROS:   12-pt ROS otherwise negative except as noted above    PMH:  Past Medical History:   Diagnosis Date     CAD (coronary artery disease)      CHF (congestive heart failure) (H)      CKD (chronic kidney disease), stage III (H)      Cortical cataract of both eyes      Diabetes (H)      Hyperlipidemia      Hypertension      Ischemic cardiomyopathy      Obesity      CHANTEL (obstructive sleep apnea)     occas cpap     Osteoarthritis        PSH:  Past Surgical History:   Procedure Laterality Date     C CABG, ARTERY-VEIN, THREE  02/2008     CATARACT IOL, RT/LT       COLONOSCOPY N/A 8/7/2019    Procedure: COLONOSCOPY, WITH POLYPECTOMY AND BIOPSY;  Surgeon: Chauncey Morataya MD;  Location:  GI     CV RIGHT HEART CATH N/A 3/25/2019    Procedure: CV RIGHT HEART CATH;  Surgeon: Moises Santos MD;  Location:  HEART CARDIAC CATH LAB     CV RIGHT HEART CATH N/A 7/10/2019    Procedure: CV RIGHT HEART CATH;  Surgeon: Jak Mccabe MD;  Location:  HEART CARDIAC CATH LAB     CV RIGHT HEART CATH N/A 7/8/2020    Procedure: Right Heart Cath with Leave In [place] already has ICU load;  Surgeon: Jak Mccabe MD;  Location:  HEART CARDIAC CATH LAB     IMPLANT AUTOMATIC IMPLANTABLE CARDIOVERTER DEFIBRILLATOR       PHACOEMULSIFICATION CLEAR CORNEA WITH STANDARD IOL, VITRECTOMY PARSPLANA 25 GAGUE, COMBINED Left 12/10/2019    Procedure: PHACOEMULSIFICATION, CATARACT, CLEAR CORNEAL INCISION APPROACH, W STD INTRAOCULAR LENS IMPLANT INSERT + VITRECTOMY BY PARS PLANA  USING 25-GAUGE INSTRUMENTS. ENDOLASER, INFUSION OF 20% SF6 GAS;  Surgeon: Triny Bunch MD;  Location: [facility]     PICC INSERTION Right 07/11/2020    basilic 44 cm total        MEDICATIONS:  Prior to Admission Medications   Prescriptions Last Dose Informant Patient Reported? Taking?   aspirin 81 MG tablet  Self No No   Sig: Take 1 tablet (81 mg) by mouth daily   atorvastatin (LIPITOR) 40 MG tablet  Self No No   Sig: Take 1 tablet (40 mg) by  mouth daily   blood glucose (ACCU-CHEK SMARTVIEW) test strip  Self No No   Sig: USE TO TEST THREE TIMES DAILY AS DIRECTED   blood glucose (NO BRAND SPECIFIED) test strip  Self No No   Sig: Use to test blood sugar 2 times daily or as directed.   blood glucose monitoring (ACCU-CHEK FELIPE SMARTVIEW) meter device kit  Self No No   Sig: Use to test blood sugar 3-4 times daily, as directed.   blood glucose monitoring (ONE TOUCH DELICA) lancets  Self No No   Sig: Use to test blood sugars 2 times daily.   clopidogrel (PLAVIX) 75 MG tablet  Self No No   Sig: Take 1 tablet (75 mg) by mouth daily   exenatide ER (BYDUREON) 2 MG pen  Self No No   Sig: Inject 2 mg Subcutaneous every 7 days   furosemide (LASIX) 20 MG tablet  Self No No   Sig: Take 2 tablets (40 mg) by mouth 2 times daily   glimepiride (AMARYL) 4 MG tablet  Self No No   Sig: Take 1 tablet (4 mg) by mouth every morning (before breakfast)   hydrALAZINE (APRESOLINE) 25 MG tablet  Self No No   Sig: Take 1 tablet (25 mg) by mouth 3 times daily   insulin glargine (LANTUS SOLOSTAR) 100 UNIT/ML pen  Self No No   Sig: Take 8 units in the morning and 40 units in the evening   insulin pen needle (B-D U/F) 31G X 5 MM miscellaneous  Self No No   Si Units by Device route 2 times daily Use 2 pen needles daily or as directed.   isosorbide mononitrate (IMDUR) 60 MG 24 hr tablet  Self No No   Sig: Take 1 tablet (60 mg) by mouth daily   losartan (COZAAR) 50 MG tablet  Spouse/Significant Other No No   Sig: Take 1 tablet (50 mg) by mouth daily   nitroglycerin (NITROSTAT) 0.4 MG SL tablet  Self No No   Sig: Place 1 tablet (0.4 mg) under the tongue every 5 minutes as needed for chest pain If you are still having symptoms after 3 doses (15 minutes) call 911.   order for DME  Self No No   Sig: Auto CPAP 8-15 cmH20      Facility-Administered Medications Last Administration Doses Remaining   erythromycin (ROMYCIN) ophthalmic ointment None recorded    lidocaine (PF) (XYLOCAINE) 2 %  injection 10 mL None recorded 1           ALLERGIES:     Allergies   Allergen Reactions     No Known Drug Allergies        FAMILY HISTORY:  Family History   Problem Relation Age of Onset     Diabetes Brother      Diabetes Sister      Diabetes Sister      Macular Degeneration No family hx of      Glaucoma No family hx of      Myocardial Infarction No family hx of      Kidney Disease No family hx of        SOCIAL HISTORY:  Social History     Socioeconomic History     Marital status:      Spouse name: Not on file     Number of children: 4     Years of education: Not on file     Highest education level: Not on file   Occupational History     Occupation:      Employer: [employer]     Employer: RETIRED   Social Needs     Financial resource strain: Not on file     Food insecurity     Worry: Not on file     Inability: Not on file     Transportation needs     Medical: Not on file     Non-medical: Not on file   Tobacco Use     Smoking status: Never Smoker     Smokeless tobacco: Never Used     Tobacco comment: Never smoked; non-smoking household   Substance and Sexual Activity     Alcohol use: No     Alcohol/week: 0.0 standard drinks     Drug use: No     Sexual activity: Yes     Partners: Female   Lifestyle     Physical activity     Days per week: Not on file     Minutes per session: Not on file     Stress: Not on file   Relationships     Social connections     Talks on phone: Not on file     Gets together: Not on file     Attends Church service: Not on file     Active member of club or organization: Not on file     Attends meetings of clubs or organizations: Not on file     Relationship status: Not on file     Intimate partner violence     Fear of current or ex partner: Not on file     Emotionally abused: Not on file     Physically abused: Not on file     Forced sexual activity: Not on file   Other Topics Concern     Parent/sibling w/ CABG, MI or angioplasty before 65F 55M? No   Social History  Narrative     Not on file       LABS:  BMP  Recent Labs   Lab 07/14/20  0432 07/13/20  1309 07/13/20  0351 07/12/20  1621    134 136 136   POTASSIUM 4.4 4.8 4.1 3.7   CHLORIDE 104 102 103 106   CO2 25 25 26 26   BUN 37* 37* 36* 32*   CR 1.99* 1.98* 1.87* 1.67*   * 160* 138* 163*   BRYANNA 8.9 9.0 9.2 8.3*     CBC  Recent Labs   Lab 07/14/20  0432 07/13/20  0351 07/12/20  0335 07/11/20  0458   WBC 7.7 7.1 7.4 7.1   HGB 11.4* 12.1* 12.2* 11.4*   HCT 35.6* 38.6* 38.0* 35.8*   MCV 96 96 97 97    251 219 189     INR  Recent Labs   Lab 07/13/20  0351   INR 1.05     IMAGING:    RHC 6/18/20     RA 20/26/18  RV 85/28  PA 82/45/58  PCWP 45  Cardiac output by Jacy: 3.85 L/min (indexed to 2.09 L/min/m2)  Cardiac output by thermodilution: 3.63 L/min (indexed to 1.97 L/min/m2)  SVR (by TD CO): 1123  PVR (by TD CO): 7.4    Angiogram 6/18/20   3 vessel CAD s/p 3V CABG in 2008 (LIMA-LAD, SVG-OM1, SVG-RPDA)  2. Known proximal SVG-RPDA occlusion  3. Presumed occlusion of SVG-OM1 (unable to locate on non-selective aortic root shot)  4. Patent LIMA-LAD with collateralization of LAD to PDA    Echocardiogram ( 3/11/2019)   Moderate to severe left ventricular dilation is present.  Severely (EF 10-20%) reduced left ventricular function is present.  No significant valve dysfunction.  Compared to study done 6/5/17 there is no significant change in LV size and  systolic function    Echocardiogram ( 7/5/2020)   Interpretation Summary  Severely (EF 10-20%) reduced left ventricular function is present. LVEF 15%  based on biplane 2D tracing. Severe left ventricular dilation is present.  LVIDd:6.8 cm. Grade III or advanced diastolic dysfunction.  The right ventricle is normal size.  Global right ventricular function is moderately reduced.  No significant valvular abnormalities were noted.  IVC diameter and respiratory changes fall into an intermediate range  suggesting an RA pressure of 8 mmHg.  Mild pulmonary hypertension is  present.     This study was compared with the study from 3/25/2019. RV function has  worsened, LV size and function appear similar.    Devices  ICD ([employer]- 4/2/2015 )

## 2020-07-14 NOTE — PROGRESS NOTES
Transferred to: Cath lab 1530 with ailyn robertson RN  Belongings: sent with wife & rest of belongings brought to 4515 on 4E   Cal[place]on removed? NA  Central line removed? no  Chart and medications sent with patient: yes  Family notified: wife at bedside

## 2020-07-14 NOTE — PLAN OF CARE
ICU End of Shift Summary. See flowsheets for vital signs and detailed assessment.    Changes this shift: tmax 99.2. Mouth bleeding from teeth removal- gave gauze to bite on per orders. No pain. Slightly tachycardic. NPO since midnight. Heparin off at 0400.    Plan: Intraaortic balloon pump and swan today. Continue to monitor and alert team to any changes.

## 2020-07-15 ENCOUNTER — APPOINTMENT (OUTPATIENT)
Dept: GENERAL RADIOLOGY | Facility: CLINIC | Age: 65
DRG: 001 | End: 2020-07-15
Attending: STUDENT IN AN ORGANIZED HEALTH CARE EDUCATION/TRAINING PROGRAM
Payer: COMMERCIAL

## 2020-07-15 LAB
ABO + RH BLD: NORMAL
ABO + RH BLD: NORMAL
ANION GAP SERPL CALCULATED.3IONS-SCNC: 4 MMOL/L (ref 3–14)
ANION GAP SERPL CALCULATED.3IONS-SCNC: 5 MMOL/L (ref 3–14)
BASE DEFICIT BLDV-SCNC: 0.2 MMOL/L
BASE DEFICIT BLDV-SCNC: 0.6 MMOL/L
BASE EXCESS BLDV CALC-SCNC: 0.1 MMOL/L
BASE EXCESS BLDV CALC-SCNC: 1.6 MMOL/L
BLD GP AB SCN SERPL QL: NORMAL
BLOOD BANK CMNT PATIENT-IMP: NORMAL
BUN SERPL-MCNC: 36 MG/DL (ref 7–30)
BUN SERPL-MCNC: 37 MG/DL (ref 7–30)
CALCIUM SERPL-MCNC: 8.4 MG/DL (ref 8.5–10.1)
CALCIUM SERPL-MCNC: 8.6 MG/DL (ref 8.5–10.1)
CHLORIDE SERPL-SCNC: 106 MMOL/L (ref 94–109)
CHLORIDE SERPL-SCNC: 106 MMOL/L (ref 94–109)
CO2 SERPL-SCNC: 26 MMOL/L (ref 20–32)
CO2 SERPL-SCNC: 27 MMOL/L (ref 20–32)
CREAT SERPL-MCNC: 1.86 MG/DL (ref 0.66–1.25)
CREAT SERPL-MCNC: 1.89 MG/DL (ref 0.66–1.25)
ERYTHROCYTE [DISTWIDTH] IN BLOOD BY AUTOMATED COUNT: 15.5 % (ref 10–15)
ERYTHROCYTE [DISTWIDTH] IN BLOOD BY AUTOMATED COUNT: 15.6 % (ref 10–15)
GFR SERPL CREATININE-BSD FRML MDRD: 36 ML/MIN/{1.73_M2}
GFR SERPL CREATININE-BSD FRML MDRD: 37 ML/MIN/{1.73_M2}
GLUCOSE BLDC GLUCOMTR-MCNC: 143 MG/DL (ref 70–99)
GLUCOSE BLDC GLUCOMTR-MCNC: 176 MG/DL (ref 70–99)
GLUCOSE BLDC GLUCOMTR-MCNC: 182 MG/DL (ref 70–99)
GLUCOSE BLDC GLUCOMTR-MCNC: 243 MG/DL (ref 70–99)
GLUCOSE BLDC GLUCOMTR-MCNC: 254 MG/DL (ref 70–99)
GLUCOSE SERPL-MCNC: 141 MG/DL (ref 70–99)
GLUCOSE SERPL-MCNC: 273 MG/DL (ref 70–99)
HCO3 BLDV-SCNC: 25 MMOL/L (ref 21–28)
HCO3 BLDV-SCNC: 27 MMOL/L (ref 21–28)
HCT VFR BLD AUTO: 33 % (ref 40–53)
HCT VFR BLD AUTO: 35 % (ref 40–53)
HGB BLD-MCNC: 10.4 G/DL (ref 13.3–17.7)
HGB BLD-MCNC: 11 G/DL (ref 13.3–17.7)
LMWH PPP CHRO-ACNC: 0.33 IU/ML
LMWH PPP CHRO-ACNC: 0.48 IU/ML
MAGNESIUM SERPL-MCNC: 2.3 MG/DL (ref 1.6–2.3)
MAGNESIUM SERPL-MCNC: 2.4 MG/DL (ref 1.6–2.3)
MCH RBC QN AUTO: 30.7 PG (ref 26.5–33)
MCH RBC QN AUTO: 31 PG (ref 26.5–33)
MCHC RBC AUTO-ENTMCNC: 31.4 G/DL (ref 31.5–36.5)
MCHC RBC AUTO-ENTMCNC: 31.5 G/DL (ref 31.5–36.5)
MCV RBC AUTO: 98 FL (ref 78–100)
MCV RBC AUTO: 98 FL (ref 78–100)
O2/TOTAL GAS SETTING VFR VENT: 21 %
OXYHGB MFR BLDV: 56 %
OXYHGB MFR BLDV: 59 %
OXYHGB MFR BLDV: 61 %
OXYHGB MFR BLDV: 66 %
PCO2 BLDV: 43 MM HG (ref 40–50)
PCO2 BLDV: 43 MM HG (ref 40–50)
PCO2 BLDV: 44 MM HG (ref 40–50)
PCO2 BLDV: 45 MM HG (ref 40–50)
PH BLDV: 7.37 PH (ref 7.32–7.43)
PH BLDV: 7.37 PH (ref 7.32–7.43)
PH BLDV: 7.38 PH (ref 7.32–7.43)
PH BLDV: 7.39 PH (ref 7.32–7.43)
PLATELET # BLD AUTO: 218 10E9/L (ref 150–450)
PLATELET # BLD AUTO: 218 10E9/L (ref 150–450)
PO2 BLDV: 32 MM HG (ref 25–47)
PO2 BLDV: 34 MM HG (ref 25–47)
PO2 BLDV: 35 MM HG (ref 25–47)
PO2 BLDV: 38 MM HG (ref 25–47)
POTASSIUM SERPL-SCNC: 4.1 MMOL/L (ref 3.4–5.3)
POTASSIUM SERPL-SCNC: 4.3 MMOL/L (ref 3.4–5.3)
RBC # BLD AUTO: 3.36 10E12/L (ref 4.4–5.9)
RBC # BLD AUTO: 3.58 10E12/L (ref 4.4–5.9)
SODIUM SERPL-SCNC: 137 MMOL/L (ref 133–144)
SODIUM SERPL-SCNC: 138 MMOL/L (ref 133–144)
SPECIMEN EXP DATE BLD: NORMAL
WBC # BLD AUTO: 6 10E9/L (ref 4–11)
WBC # BLD AUTO: 6.6 10E9/L (ref 4–11)

## 2020-07-15 PROCEDURE — 25000132 ZZH RX MED GY IP 250 OP 250 PS 637: Performed by: STUDENT IN AN ORGANIZED HEALTH CARE EDUCATION/TRAINING PROGRAM

## 2020-07-15 PROCEDURE — 40000048 ZZH STATISTIC DAILY SWAN MONITORING

## 2020-07-15 PROCEDURE — 25000132 ZZH RX MED GY IP 250 OP 250 PS 637: Performed by: ANESTHESIOLOGY

## 2020-07-15 PROCEDURE — 85520 HEPARIN ASSAY: CPT | Performed by: INTERNAL MEDICINE

## 2020-07-15 PROCEDURE — 25000132 ZZH RX MED GY IP 250 OP 250 PS 637: Performed by: SURGERY

## 2020-07-15 PROCEDURE — 25800030 ZZH RX IP 258 OP 636: Performed by: SURGERY

## 2020-07-15 PROCEDURE — 25800030 ZZH RX IP 258 OP 636: Performed by: INTERNAL MEDICINE

## 2020-07-15 PROCEDURE — 86850 RBC ANTIBODY SCREEN: CPT | Performed by: INTERNAL MEDICINE

## 2020-07-15 PROCEDURE — 40000275 ZZH STATISTIC RCP TIME EA 10 MIN

## 2020-07-15 PROCEDURE — 25000128 H RX IP 250 OP 636: Performed by: INTERNAL MEDICINE

## 2020-07-15 PROCEDURE — 40000196 ZZH STATISTIC RAPCV CVP MONITORING

## 2020-07-15 PROCEDURE — 85520 HEPARIN ASSAY: CPT | Performed by: STUDENT IN AN ORGANIZED HEALTH CARE EDUCATION/TRAINING PROGRAM

## 2020-07-15 PROCEDURE — 20000004 ZZH R&B ICU UMMC

## 2020-07-15 PROCEDURE — 25000128 H RX IP 250 OP 636: Performed by: SURGERY

## 2020-07-15 PROCEDURE — 00000146 ZZHCL STATISTIC GLUCOSE BY METER IP

## 2020-07-15 PROCEDURE — 80048 BASIC METABOLIC PNL TOTAL CA: CPT | Performed by: INTERNAL MEDICINE

## 2020-07-15 PROCEDURE — 25000128 H RX IP 250 OP 636: Performed by: STUDENT IN AN ORGANIZED HEALTH CARE EDUCATION/TRAINING PROGRAM

## 2020-07-15 PROCEDURE — 71045 X-RAY EXAM CHEST 1 VIEW: CPT

## 2020-07-15 PROCEDURE — 86901 BLOOD TYPING SEROLOGIC RH(D): CPT | Performed by: INTERNAL MEDICINE

## 2020-07-15 PROCEDURE — 85027 COMPLETE CBC AUTOMATED: CPT | Performed by: INTERNAL MEDICINE

## 2020-07-15 PROCEDURE — 40000076 ZZH STATISTIC IABP MONITORING

## 2020-07-15 PROCEDURE — 82805 BLOOD GASES W/O2 SATURATION: CPT | Performed by: INTERNAL MEDICINE

## 2020-07-15 PROCEDURE — 25000132 ZZH RX MED GY IP 250 OP 250 PS 637: Performed by: INTERNAL MEDICINE

## 2020-07-15 PROCEDURE — 86900 BLOOD TYPING SEROLOGIC ABO: CPT | Performed by: INTERNAL MEDICINE

## 2020-07-15 PROCEDURE — 99233 SBSQ HOSP IP/OBS HIGH 50: CPT | Mod: GC | Performed by: INTERNAL MEDICINE

## 2020-07-15 PROCEDURE — 83735 ASSAY OF MAGNESIUM: CPT | Performed by: INTERNAL MEDICINE

## 2020-07-15 RX ORDER — NOREPINEPHRINE BITARTRATE 0.06 MG/ML
0.03-0.4 INJECTION, SOLUTION INTRAVENOUS CONTINUOUS
Status: CANCELLED | OUTPATIENT
Start: 2020-07-16

## 2020-07-15 RX ORDER — AMIODARONE HYDROCHLORIDE 200 MG/1
200 TABLET ORAL DAILY
Status: DISCONTINUED | OUTPATIENT
Start: 2020-07-16 | End: 2020-07-20

## 2020-07-15 RX ORDER — DOPAMINE HYDROCHLORIDE 320 MG/100ML
2.5 INJECTION, SOLUTION INTRAVENOUS CONTINUOUS
Status: DISCONTINUED | OUTPATIENT
Start: 2020-07-15 | End: 2020-07-15 | Stop reason: ALTCHOICE

## 2020-07-15 RX ADMIN — OXYCODONE HYDROCHLORIDE 5 MG: 5 TABLET ORAL at 10:47

## 2020-07-15 RX ADMIN — HEPARIN SODIUM 950 UNITS/HR: 10000 INJECTION, SOLUTION INTRAVENOUS at 16:36

## 2020-07-15 RX ADMIN — AMIODARONE HYDROCHLORIDE 400 MG: 200 TABLET ORAL at 07:57

## 2020-07-15 RX ADMIN — ISOSORBIDE MONONITRATE 60 MG: 60 TABLET, EXTENDED RELEASE ORAL at 07:57

## 2020-07-15 RX ADMIN — DOPAMINE HYDROCHLORIDE 2.5 MCG/KG/MIN: 40 INJECTION, SOLUTION, CONCENTRATE INTRAVENOUS at 16:21

## 2020-07-15 RX ADMIN — ATORVASTATIN CALCIUM 40 MG: 40 TABLET, FILM COATED ORAL at 07:56

## 2020-07-15 RX ADMIN — HYDRALAZINE HYDROCHLORIDE 75 MG: 25 TABLET, FILM COATED ORAL at 07:56

## 2020-07-15 RX ADMIN — ASPIRIN 81 MG CHEWABLE TABLET 81 MG: 81 TABLET CHEWABLE at 07:57

## 2020-07-15 ASSESSMENT — ACTIVITIES OF DAILY LIVING (ADL)
ADLS_ACUITY_SCORE: 16

## 2020-07-15 ASSESSMENT — MIFFLIN-ST. JEOR: SCORE: 1464.84

## 2020-07-15 NOTE — PLAN OF CARE
Pt A&O x4 c/o pain at incision site. PRN oxy administered x1 this shift w/ effectiveness. Pt on bedrest r/t device. Pt turned q2hr for comfort and skin integrity. Heparin infusing at 950 units/hr. Anti-xa within range. Recheck in the AM. Dopamine infusing at 2.5 mcg/kg/min. Pt to be NPO @ midnight for possible balloon place in the subclavian on 7/16. Pt making good urine output using urinal at bedside. No BM this shift.

## 2020-07-15 NOTE — PROGRESS NOTES
[facility]   Cardiology II Service / Advanced Heart Failure  Progress note    Lucian Morillo  : 1955  MRN # [id number]    ADMIT DATE: 7/3/2020    Changes today  - hold imdur and hydralazine given hypotension   - add dopamine 2.5 mcg/kg/min   - hold diuretics this morning, readdress later based on CVP   - will transition IABP to subclavian in coming days   - continue to trend hemodynamics     ASSESSMENT & PLAN:  Lucian Henderson Sr. is a 64 year old male with a PMH of HTN, DMII, obesity, CKD, CHANTEL, ischemic cardiomyopathy and severe LV systolic dysfunction s/p 3V CABG () and primary prevention ICD ( 4/2/15). Currently he presents with 3 days of worsening fatigue and SOB with minimal exertion. Patient claims he could walk 1-2 blocks last week but has been barely able to ambulate home recently.     Admitted for possible LVAD/Transplant work up. IABP and listed status 2 for heart transplant .    Date RA MPAP PCWP SV02 Jacy CO Jacy CI MAP SVR PVR Intervention   20 8 50 38 49 2.3 1.28 89 2400 5.2    20  6 48 28 35 4.0 2.1 69 1300 2.2 Nipride, PO afterload reduction    7/10 9 56/34 32 68 5.1 2.7 68 900 1.8 Nipride 1, PO afterload reduction    14 65/42/50 36 60 4.3 2.2 89 1100 1.8 Nipride 0.25, PO afterload reduction    7/15 8 52/28/36 24 66 4.1 2.2 100 1200 2.9 IABP 1:1, PO afterload reduction        # ICM s/p 3V CABG () and primary prevention ICD ( 4/2/15)  #HFrEF Stage D, NYHA III-IV  # Ambulatory cardiogenic shock  # Arrhythmia: HF associated VT/VF  Currently he presents with 3 days of worsening fatigue and SOB with minimal exertion, cardiac cachexia and cold extremities. Admitted for possible LVAD/Transplant work up. - Echocardiogram showed an EF of 15% on 20  - Diuresis : lasix 40 mg IV BID. Changed to PO   - ASA 81 mg daily  - Atorvastatin 40 daily  - Hydralazine 75 TID, IMDUR 60 mg (held 7/15)  - Dopamine 2.5 (started 7/15)   - Stop  "losartan 7/12  - He has had a colonoscopy on 8/7/19    - IABP placement tentatively on Tuesday   - NPO at [facility] for dental extraction as part of pre-heart tx work-up   - s/p tooth extractions 7/13  - 24 hours Augmentin for zander-op ppx given teeth removal and plan for IABP   - IABP and listed status 2 for heart transplant 7/14    #Atrial Flutter with RVR  Patient went into Atrial flutter with RVR with rates around 150 overnight on 7/7/2020 at around 2 am. 5 mg IV beta-blocker was administered but this significantly lowered the patient's blood pressure.  - 5 mg IV beta-blocker administered and heparin started on 7/7/20 over night when patient went into flutter.    - Stopped Lasix, 250 ml LR Bolus (7/6/20)  - IV Amiodarone bolus 150 , followed by 1mg/min infusion for 6h followed by 0.5mg/min, started amiodarone 400 mg PO BID 7/9 (will do BID dosing for ~ 5 days, 7/14 can go to daily dosing)   - Digoxin  mcg (7/6/20)    # DMII ( A1c: 8.3)   - Finger sticks  - Hypoglycemia protocol   - Moderate intensity sliding scale insulin       #CKD  - Monitor Scr and K Q12h   - Avoid nephrotoxic agents    Floor Care  Fluids: 1.5L fluid restriction  Food: 2 gram sodium diet, low fat diet  Electrolyte repletion: potassium/magnesium sliding scale  DVT ppx: Lovenox  Glycemic Control: sliding scale insulin   Lines: PICC   Consults: none  Code Status: full    Patient was discussed with attending physician, Dr. Anand.     Héctor Smith MD   Cardiology PGY-5  ..........................................................................................................................................................  Subjective:  SARITA overnight. Nurses notes reviewed.   Reported doing well this morning. Denies SOB or chest pain.      PHYSICAL EXAM:  BP (!) 150/72   Pulse 76   Temp 98.4  F (36.9  C) (Oral)   Resp 18   Ht 1.626 m (5' 4\")   Wt 76.4 kg (168 lb 6.4 oz)   SpO2 100%   BMI 28.91 kg/m    GENERAL: NAD  NECK: Supple and " without lymphadenopathy. JVP 12 cm  CV: S1/S2 heard without murmur   RESPIRATORY: CTAB  GI: Soft and distended. No tenderness, rebound, guarding. No palpable organomegaly.   EXTREMITIES: Peripheral edema trace.   NEUROLOGIC: Alert and orientated x 3.   MUSCULOSKELETAL: No joint swelling or tenderness.   SKIN: No jaundice. No acute rashes or lesions.     ROS:   12-pt ROS otherwise negative except as noted above    PMH:  Past Medical History:   Diagnosis Date     CAD (coronary artery disease)      CHF (congestive heart failure) (H)      CKD (chronic kidney disease), stage III (H)      Cortical cataract of both eyes      Diabetes (H)      Hyperlipidemia      Hypertension      Ischemic cardiomyopathy      Obesity      CHANTEL (obstructive sleep apnea)     occas cpap     Osteoarthritis        PSH:  Past Surgical History:   Procedure Laterality Date     C CABG, ARTERY-VEIN, THREE  02/2008     CATARACT IOL, RT/LT       COLONOSCOPY N/A 8/7/2019    Procedure: COLONOSCOPY, WITH POLYPECTOMY AND BIOPSY;  Surgeon: Chauncey Morataya MD;  Location:  GI     CV RIGHT HEART CATH N/A 3/25/2019    Procedure: CV RIGHT HEART CATH;  Surgeon: Moises Santos MD;  Location:  HEART CARDIAC CATH LAB     CV RIGHT HEART CATH N/A 7/10/2019    Procedure: CV RIGHT HEART CATH;  Surgeon: Jak Mccabe MD;  Location:  HEART CARDIAC CATH LAB     CV RIGHT HEART CATH N/A 7/8/2020    Procedure: Right Heart Cath with Leave In [place] already has ICU load;  Surgeon: Jak Mccabe MD;  Location:  HEART CARDIAC CATH LAB     EXTRACTION(S) DENTAL Left 7/13/2020    Procedure: EXTRACTION, TOOTH #11, 12, 13, 15, and 29;  Surgeon: Monica Chao DDS;  Location:  OR     IMPLANT AUTOMATIC IMPLANTABLE CARDIOVERTER DEFIBRILLATOR       PHACOEMULSIFICATION CLEAR CORNEA WITH STANDARD IOL, VITRECTOMY PARSPLANA 25 GAGUE, COMBINED Left 12/10/2019    Procedure: PHACOEMULSIFICATION, CATARACT, CLEAR CORNEAL INCISION APPROACH, W STD INTRAOCULAR LENS IMPLANT  INSERT + VITRECTOMY BY PARS PLANA  USING 25-GAUGE INSTRUMENTS. ENDOLASER, INFUSION OF 20% SF6 GAS;  Surgeon: Triny Bunch MD;  [facility]     PICC INSERTION Right 2020    basilic 44 cm total        MEDICATIONS:  Prior to Admission Medications   Prescriptions Last Dose Informant Patient Reported? Taking?   aspirin 81 MG tablet  Self No No   Sig: Take 1 tablet (81 mg) by mouth daily   atorvastatin (LIPITOR) 40 MG tablet  Self No No   Sig: Take 1 tablet (40 mg) by mouth daily   blood glucose (ACCU-CHEK SMARTVIEW) test strip  Self No No   Sig: USE TO TEST THREE TIMES DAILY AS DIRECTED   blood glucose (NO BRAND SPECIFIED) test strip  Self No No   Sig: Use to test blood sugar 2 times daily or as directed.   blood glucose monitoring (ACCU-CHEK FELIPE SMARTVIEW) meter device kit  Self No No   Sig: Use to test blood sugar 3-4 times daily, as directed.   blood glucose monitoring (ONE TOUCH DELICA) lancets  Self No No   Sig: Use to test blood sugars 2 times daily.   clopidogrel (PLAVIX) 75 MG tablet  Self No No   Sig: Take 1 tablet (75 mg) by mouth daily   exenatide ER (BYDUREON) 2 MG pen  Self No No   Sig: Inject 2 mg Subcutaneous every 7 days   furosemide (LASIX) 20 MG tablet  Self No No   Sig: Take 2 tablets (40 mg) by mouth 2 times daily   glimepiride (AMARYL) 4 MG tablet  Self No No   Sig: Take 1 tablet (4 mg) by mouth every morning (before breakfast)   hydrALAZINE (APRESOLINE) 25 MG tablet  Self No No   Sig: Take 1 tablet (25 mg) by mouth 3 times daily   insulin glargine (LANTUS SOLOSTAR) 100 UNIT/ML pen  Self No No   Sig: Take 8 units in the morning and 40 units in the evening   insulin pen needle (B-D U/F) 31G X 5 MM miscellaneous  Self No No   Si Units by Device route 2 times daily Use 2 pen needles daily or as directed.   isosorbide mononitrate (IMDUR) 60 MG 24 hr tablet  Self No No   Sig: Take 1 tablet (60 mg) by mouth daily   losartan (COZAAR) 50 MG tablet  Spouse/Significant Other No No    Sig: Take 1 tablet (50 mg) by mouth daily   nitroglycerin (NITROSTAT) 0.4 MG SL tablet  Self No No   Sig: Place 1 tablet (0.4 mg) under the tongue every 5 minutes as needed for chest pain If you are still having symptoms after 3 doses (15 minutes) call 911.   order for DME  Self No No   Sig: Auto CPAP 8-15 cmH20      Facility-Administered Medications Last Administration Doses Remaining   erythromycin (ROMYCIN) ophthalmic ointment None recorded    lidocaine (PF) (XYLOCAINE) 2 % injection 10 mL None recorded 1           ALLERGIES:     Allergies   Allergen Reactions     No Known Drug Allergies        FAMILY HISTORY:  Family History   Problem Relation Age of Onset     Diabetes Brother      Diabetes Sister      Diabetes Sister      Macular Degeneration No family hx of      Glaucoma No family hx of      Myocardial Infarction No family hx of      Kidney Disease No family hx of        SOCIAL HISTORY:  Social History     Socioeconomic History     Marital status:      Spouse name: Not on file     Number of children: 4     Years of education: Not on file     Highest education level: Not on file   Occupational History     Occupation:      Employer: [employer]     Employer: RETIRED   Social Needs     Financial resource strain: Not on file     Food insecurity     Worry: Not on file     Inability: Not on file     Transportation needs     Medical: Not on file     Non-medical: Not on file   Tobacco Use     Smoking status: Never Smoker     Smokeless tobacco: Never Used     Tobacco comment: Never smoked; non-smoking household   Substance and Sexual Activity     Alcohol use: No     Alcohol/week: 0.0 standard drinks     Drug use: No     Sexual activity: Yes     Partners: Female   Lifestyle     Physical activity     Days per week: Not on file     Minutes per session: Not on file     Stress: Not on file   Relationships     Social connections     Talks on phone: Not on file     Gets together: Not on file      Attends Taoism service: Not on file     Active member of club or organization: Not on file     Attends meetings of clubs or organizations: Not on file     Relationship status: Not on file     Intimate partner violence     Fear of current or ex partner: Not on file     Emotionally abused: Not on file     Physically abused: Not on file     Forced sexual activity: Not on file   Other Topics Concern     Parent/sibling w/ CABG, MI or angioplasty before 65F 55M? No   Social History Narrative     Not on file       LABS:  BMP  Recent Labs   Lab 07/15/20  0352 07/14/20  1518 07/14/20  0432 07/13/20  1309    137 137 134   POTASSIUM 4.1 4.6 4.4 4.8   CHLORIDE 106 104 104 102   CO2 26 25 25 25   BUN 37* 36* 37* 37*   CR 1.89* 2.06* 1.99* 1.98*   * 129* 159* 160*   BRYANNA 8.6 8.9 8.9 9.0     CBC  Recent Labs   Lab 07/15/20  0352 07/14/20  2308 07/14/20  0432 07/13/20  0351   WBC 6.6 6.0 7.7 7.1   HGB 11.0* 10.4* 11.4* 12.1*   HCT 35.0* 33.0* 35.6* 38.6*   MCV 98 98 96 96    218 247 251     INR  Recent Labs   Lab 07/13/20  0351   INR 1.05     IMAGING:    RHC 6/18/20     RA 20/26/18  RV 85/28  PA 82/45/58  PCWP 45  Cardiac output by Jacy: 3.85 L/min (indexed to 2.09 L/min/m2)  Cardiac output by thermodilution: 3.63 L/min (indexed to 1.97 L/min/m2)  SVR (by TD CO): 1123  PVR (by TD CO): 7.4    Angiogram 6/18/20   3 vessel CAD s/p 3V CABG in 2008 (LIMA-LAD, SVG-OM1, SVG-RPDA)  2. Known proximal SVG-RPDA occlusion  3. Presumed occlusion of SVG-OM1 (unable to locate on non-selective aortic root shot)  4. Patent LIMA-LAD with collateralization of LAD to PDA    Echocardiogram ( 3/11/2019)   Moderate to severe left ventricular dilation is present.  Severely (EF 10-20%) reduced left ventricular function is present.  No significant valve dysfunction.  Compared to study done 6/5/17 there is no significant change in LV size and  systolic function    Echocardiogram ( 7/5/2020)   Interpretation Summary  Severely (EF 10-20%)  reduced left ventricular function is present. LVEF 15%  based on biplane 2D tracing. Severe left ventricular dilation is present.  LVIDd:6.8 cm. Grade III or advanced diastolic dysfunction.  The right ventricle is normal size.  Global right ventricular function is moderately reduced.  No significant valvular abnormalities were noted.  IVC diameter and respiratory changes fall into an intermediate range  suggesting an RA pressure of 8 mmHg.  Mild pulmonary hypertension is present.     This study was compared with the study from 3/25/2019. RV function has  worsened, LV size and function appear similar.    Devices  ICD ([employer]- 4/2/2015 )

## 2020-07-15 NOTE — ANESTHESIA PREPROCEDURE EVALUATION
"Anesthesia Pre-Procedure Evaluation    Patient: Lucian Henderson Sr.   MRN:     [id number] Gender:   male   Age:    64 year old :      1955        Preoperative Diagnosis: Heart Failure   Procedure(s):  Subclavian intra-aortic balloon pump     LABS:  CBC:   Lab Results   Component Value Date    WBC 6.6 07/15/2020    WBC 6.0 2020    HGB 11.0 (L) 07/15/2020    HGB 10.4 (L) 2020    HCT 35.0 (L) 07/15/2020    HCT 33.0 (L) 2020     07/15/2020     2020     BMP:   Lab Results   Component Value Date     07/15/2020     2020    POTASSIUM 4.1 07/15/2020    POTASSIUM 4.6 2020    CHLORIDE 106 07/15/2020    CHLORIDE 104 2020    CO2 26 07/15/2020    CO2 25 2020    BUN 37 (H) 07/15/2020    BUN 36 (H) 2020    CR 1.89 (H) 07/15/2020    CR 2.06 (H) 2020     (H) 07/15/2020     (H) 2020     COAGS:   Lab Results   Component Value Date    PTT 29 2020    INR 1.05 2020     POC:   Lab Results   Component Value Date     (H) 07/15/2020     OTHER:   Lab Results   Component Value Date    LACT 1.0 2020    A1C 8.2 (H) 2020    BRYANNA 8.6 07/15/2020    PHOS 3.3 2019    MAG 2.4 (H) 07/15/2020    ALBUMIN 3.0 (L) 2020    PROTTOTAL 7.1 2020    ALT 24 2020    AST 26 2020    ALKPHOS 117 2020    BILITOTAL 1.0 2020    TSH 1.30 2020    CRP 9.9 2019        Preop Vitals    BP Readings from Last 3 Encounters:   07/15/20 135/51   20 107/75   12/10/19 107/70    Pulse Readings from Last 3 Encounters:   07/15/20 81   20 96   12/10/19 96      Resp Readings from Last 3 Encounters:   07/15/20 16   20 18   12/10/19 16    SpO2 Readings from Last 3 Encounters:   07/15/20 97%   20 96%   12/10/19 96%      Temp Readings from Last 1 Encounters:   07/15/20 36.8  C (98.3  F) (Oral)    Ht Readings from Last 1 Encounters:   20 1.626 m (5' 4\")      Wt " "Readings from Last 1 Encounters:   07/15/20 77.4 kg (170 lb 9.6 oz)    Estimated body mass index is 29.28 kg/m  as calculated from the following:    Height as of 7/3/20: 1.626 m (5' 4\").    Weight as of 7/15/20: 77.4 kg (170 lb 9.6 oz).     LDA:  Peripheral IV 07/03/20 Right;Anterior Upper forearm (Active)   Site Assessment [facility] 07/15/20 1200   Line Status Saline locked 07/15/20 1200   Phlebitis Scale 0-->no symptoms 07/15/20 1200   Infiltration Scale 0 07/15/20 1200   Infiltration Site Treatment Method  None 07/15/20 1200   Extravasation? No 07/15/20 1200   Number of days: 12       PICC Double Lumen 07/11/20 Right Basilic (Active)   Site Assessment [facility] 07/15/20 1200   External Cath Length (cm) 1 cm 07/11/20 1700   Extremity Circumference (cm) 31 cm 07/11/20 1700   Dressing Intervention Chlorhexidine patch;Transparent 07/15/20 1200   Dressing Change Due 07/19/20 07/15/20 0400   PICC Comment CDI 07/15/20 1200   Lumen A - Color PURPLE 07/15/20 1200   Lumen A - Status infusing 07/15/20 1200   Lumen A - Cap Change Due 07/17/20 07/15/20 0400   Lumen B - Color WHITE 07/15/20 1200   Lumen B - Status infusing 07/15/20 1200   Lumen B - Cap Change Due 07/17/20 07/15/20 0400   Extravasation? No 07/15/20 1200   Line Necessity Yes, meets criteria 07/15/20 1200   Number of days: 4       Arterial Sheath  (Active)   Specific Qualities Sutured 07/15/20 1200   Site Assessment [facility] 07/15/20 1200   Dressing Type Transparent 07/15/20 1200   Dressing Intervention Dressing changed/new dressing 07/14/20 1700   Arterial Sheath Comment IABP 07/15/20 0800   Number of days: 1       Venous Sheath (Active)   Specific Qualities Sutured 07/15/20 1200   Site Assessment [facility] 07/15/20 1200   Dressing Type Biopatch;Transparent 07/15/20 1200   Dressing Intervention Dressing changed/new dressing 07/14/20 1700   Venous Sheath Comment SWAN 07/15/20 0800   Number of days: 1       Pulmonary Artery Catheter - Double Lumen 07/14/20 0943 Right internal jugular " vein (Active)   Dressing dressing dry and intact 07/15/20 1200   Securement secured with sutures 07/15/20 1200   PA Catheter Markings (cm) 54 07/15/20 1200   Phlebitis 0-->no symptoms 07/15/20 1200   Infiltration 0-->no symptoms 07/15/20 1200   Waveform normal 07/15/20 1200   Lumen A - Color BLUE 07/15/20 1200   Lumen A - Status transduced 07/15/20 1200   Lumen A - Cap Change Due 07/17/20 07/15/20 0800   Lumen B - Color YELLOW 07/15/20 1200   Lumen B - Status transduced 07/15/20 1200   Lumen B - Cap Change Due 07/17/20 07/15/20 0800   Number of days: 1        Past Medical History:   Diagnosis Date     CAD (coronary artery disease)      CHF (congestive heart failure) (H)      CKD (chronic kidney disease), stage III (H)      Cortical cataract of both eyes      Diabetes (H)      Hyperlipidemia      Hypertension      Ischemic cardiomyopathy      Obesity      CHANTEL (obstructive sleep apnea)     occas cpap     Osteoarthritis       Past Surgical History:   Procedure Laterality Date     C CABG, ARTERY-VEIN, THREE  02/2008     CATARACT IOL, RT/LT       COLONOSCOPY N/A 8/7/2019    Procedure: COLONOSCOPY, WITH POLYPECTOMY AND BIOPSY;  Surgeon: Chauncey Morataya MD;  Location:  GI     CV RIGHT HEART CATH N/A 3/25/2019    Procedure: CV RIGHT HEART CATH;  Surgeon: Moises Santos MD;  Location:  HEART CARDIAC CATH LAB     CV RIGHT HEART CATH N/A 7/10/2019    Procedure: CV RIGHT HEART CATH;  Surgeon: Jak Mccabe MD;  Location:  HEART CARDIAC CATH LAB     CV RIGHT HEART CATH N/A 7/8/2020    Procedure: Right Heart Cath with Leave In [place] already has ICU load;  Surgeon: Jak Mccabe MD;  Location:  HEART CARDIAC CATH LAB     EXTRACTION(S) DENTAL Left 7/13/2020    Procedure: EXTRACTION, TOOTH #11, 12, 13, 15, and 29;  Surgeon: Monica Chao DDS;  Location:  OR     IMPLANT AUTOMATIC IMPLANTABLE CARDIOVERTER DEFIBRILLATOR       PHACOEMULSIFICATION CLEAR CORNEA WITH STANDARD IOL, VITRECTOMY PARSPLANA 25  GAGUE, COMBINED Left 12/10/2019    Procedure: PHACOEMULSIFICATION, CATARACT, CLEAR CORNEAL INCISION APPROACH, W STD INTRAOCULAR LENS IMPLANT INSERT + VITRECTOMY BY PARS PLANA  USING 25-GAUGE INSTRUMENTS. ENDOLASER, INFUSION OF 20% SF6 GAS;  Surgeon: Triny Bunch MD;  [facility]     PICC INSERTION Right 07/11/2020    basilic 44 cm total       Allergies   Allergen Reactions     No Known Drug Allergies         Anesthesia Evaluation     . Pt has had prior anesthetic. Type: General (Grade View of Cords: 3 (Bougie utilized to pass ETT))    No history of anesthetic complications          ROS/MED HX    ENT/Pulmonary:     (+)sleep apnea, , . .    Neurologic:  - neg neurologic ROS     Cardiovascular:     (+) hypertension--CAD (s/p 3vCABG 2008), -CABG-date: 2008, . : . CHF (s/p intra-aortic balloon pump) etiology: ischemic cardiomyopathy Last EF: 10-20% . . :ICD Reason placed:primary prevention  type;Advebs  Settings VVI  . dysrhythmias a-flutter, . Previous cardiac testing Echodate:7/2020results:Severely (EF 10-20%) reduced left ventricular function is present. LVEF 15% based on biplane 2D tracing. Severe left ventricular dilation is present.  LVIDd:6.8 cm. Grade III or advanced diastolic dysfunction.  The right ventricle is normal size.  Global right ventricular function is moderately reduced.  No significant valvular abnormalities were noted.  IVC diameter and respiratory changes fall into an intermediate range suggesting an RA pressure of 8 mmHg.  Mild pulmonary hypertension is present.date: results:ECG reviewed date:7/8/2020 results:Sinus rhythm  Left axis deviation  Non-specific intra-ventricular conduction block  Abnormal ECGCath date: 7/2020 results:Right sided filling pressures are mildly elevated.Left sided filling pressures are severely elevated. Severely elevated pulmonary artery hypertension.Left ventricular filling pressures are severely elevated .Reduced cardiac output level.           METS/Exercise Tolerance:     Hematologic:  - neg hematologic  ROS       Musculoskeletal:  - neg musculoskeletal ROS       GI/Hepatic:  - neg GI/hepatic ROS       Renal/Genitourinary:     (+) chronic renal disease,       Endo:     (+) type II DM Last HgA1c: 8.3 Obesity, .      Psychiatric:  - neg psychiatric ROS       Infectious Disease: Comment: COVID neg 7/7/2020          Malignancy:      - no malignancy   Other:    - neg other ROS                     PHYSICAL EXAM:   Mental Status/Neuro: A/A/O   Airway:   Mallampati: II  Mouth/Opening: Full  TM distance: > 6 cm  Neck ROM: Full   Respiratory:   Resp. Rate: Normal     Resp. Effort: Normal      CV: Rhythm: Regular  Heart: Normal Sounds   Comments:      Dental: Details                  Assessment:   ASA SCORE: 4    H&P: History and physical reviewed and following examination; no interval change.         Plan:   Anes. Type:  General   Pre-Medication: None   Induction:  IV (Standard)   Airway: CMAC/VL; ETT; Oral   Access/Monitoring: A-Line; Central Access/Port present; PAC; PIV   Maintenance: Balanced     Drips/Meds: Epinephrine; Norepi     Postop Plan:   Postop Pain: Opioids  Postop Sedation/Airway: Not planned  Disposition: Inpatient/Admit; ICU     PONV Management:   Adult Risk Factors:, Postop Opioids   Prevention: Ondansetron     CONSENT: Via ; Direct conversation   Plan and risks discussed with: Patient; Spouse   Blood Products: Consented (ALL Blood Products)       Comments for Plan/Consent:  Consent obtained via [employer]  # [id number] over video chat using an ipad. All questions were answered.   ICD tachytherapies turned off. Pacing left at VVI @ 40bpm                      Angel Meza DO

## 2020-07-15 NOTE — PLAN OF CARE
Major Shift Events: AAOx3. Afebrile. Clear lung sounds, on 2L overnight. Balloon pump remains 1:1, 100%. CVP 8, PA 42/20, CI 2.5, and CO 4.7. GI/ voided once at 4 am - 300 mls. MD aware.     Plan: continue to monitor.     For vital signs and complete assessments, please see documentation flowsheets.

## 2020-07-15 NOTE — PROGRESS NOTES
Transplant Social Work Services Progress Note      Date of Initial Social Work Evaluation: 7/9/2019, 7/6/2020  Collaborated with: Shelly Alatorre, pt and and his wife, Shivani, at bedside    Data: Pt is currently listed as status 2 for heart transplant. Writer assisted pt in getting his HCD notarized, with assistance from . Writer was able to obtain a notary on video through Shelly Alatorre. Writer faxed document to Shelly Alatorre to be finalized with notary signature. Received call from Shelly Alatorre asking for clarification on pt's signature as pt is signing Lucian Tee and he is listed as Lucian Henderson.      Intervention: HCD   Assessment: Pt engaged in completing HCD.   Education provided by SW: Ongoing Social Work support, HCD   Plan:    Discharge Plans in Progress: None    Barriers to d/c plan: Medical Stability: pt is listed for heart transplant    Follow up Plan: SW to f/u with pt tomorrow to clarify signature.

## 2020-07-16 ENCOUNTER — DOCUMENTATION ONLY (OUTPATIENT)
Dept: OTHER | Facility: CLINIC | Age: 65
End: 2020-07-16

## 2020-07-16 ENCOUNTER — ANCILLARY PROCEDURE (OUTPATIENT)
Dept: CARDIOLOGY | Facility: CLINIC | Age: 65
DRG: 001 | End: 2020-07-16
Attending: THORACIC SURGERY (CARDIOTHORACIC VASCULAR SURGERY)
Payer: COMMERCIAL

## 2020-07-16 ENCOUNTER — APPOINTMENT (OUTPATIENT)
Dept: GENERAL RADIOLOGY | Facility: CLINIC | Age: 65
DRG: 001 | End: 2020-07-16
Attending: INTERNAL MEDICINE
Payer: COMMERCIAL

## 2020-07-16 ENCOUNTER — APPOINTMENT (OUTPATIENT)
Dept: GENERAL RADIOLOGY | Facility: CLINIC | Age: 65
DRG: 001 | End: 2020-07-16
Attending: STUDENT IN AN ORGANIZED HEALTH CARE EDUCATION/TRAINING PROGRAM
Payer: COMMERCIAL

## 2020-07-16 DIAGNOSIS — I42.9 CARDIOMYOPATHY (H): ICD-10-CM

## 2020-07-16 DIAGNOSIS — I42.9 CARDIOMYOPATHY (H): Primary | ICD-10-CM

## 2020-07-16 LAB
ANION GAP SERPL CALCULATED.3IONS-SCNC: 4 MMOL/L (ref 3–14)
ANION GAP SERPL CALCULATED.3IONS-SCNC: 5 MMOL/L (ref 3–14)
BASE DEFICIT BLDV-SCNC: 0.1 MMOL/L
BASE DEFICIT BLDV-SCNC: 0.3 MMOL/L
BASE EXCESS BLDV CALC-SCNC: 0.4 MMOL/L
BUN SERPL-MCNC: 32 MG/DL (ref 7–30)
BUN SERPL-MCNC: 33 MG/DL (ref 7–30)
CALCIUM SERPL-MCNC: 8.8 MG/DL (ref 8.5–10.1)
CALCIUM SERPL-MCNC: 8.9 MG/DL (ref 8.5–10.1)
CHLORIDE SERPL-SCNC: 108 MMOL/L (ref 94–109)
CHLORIDE SERPL-SCNC: 109 MMOL/L (ref 94–109)
CO2 SERPL-SCNC: 23 MMOL/L (ref 20–32)
CO2 SERPL-SCNC: 25 MMOL/L (ref 20–32)
CREAT SERPL-MCNC: 1.68 MG/DL (ref 0.66–1.25)
CREAT SERPL-MCNC: 1.73 MG/DL (ref 0.66–1.25)
ERYTHROCYTE [DISTWIDTH] IN BLOOD BY AUTOMATED COUNT: 15 % (ref 10–15)
GFR SERPL CREATININE-BSD FRML MDRD: 40 ML/MIN/{1.73_M2}
GFR SERPL CREATININE-BSD FRML MDRD: 41 ML/MIN/{1.73_M2}
GLUCOSE BLDC GLUCOMTR-MCNC: 141 MG/DL (ref 70–99)
GLUCOSE BLDC GLUCOMTR-MCNC: 198 MG/DL (ref 70–99)
GLUCOSE BLDC GLUCOMTR-MCNC: 266 MG/DL (ref 70–99)
GLUCOSE SERPL-MCNC: 146 MG/DL (ref 70–99)
GLUCOSE SERPL-MCNC: 192 MG/DL (ref 70–99)
HCO3 BLDV-SCNC: 26 MMOL/L (ref 21–28)
HCT VFR BLD AUTO: 36.4 % (ref 40–53)
HGB BLD-MCNC: 11.4 G/DL (ref 13.3–17.7)
LMWH PPP CHRO-ACNC: 0.25 IU/ML
LMWH PPP CHRO-ACNC: 0.25 IU/ML
MAGNESIUM SERPL-MCNC: 2.4 MG/DL (ref 1.6–2.3)
MCH RBC QN AUTO: 30.4 PG (ref 26.5–33)
MCHC RBC AUTO-ENTMCNC: 31.3 G/DL (ref 31.5–36.5)
MCV RBC AUTO: 97 FL (ref 78–100)
MDC_IDC_LEAD_IMPLANT_DT: NORMAL
MDC_IDC_LEAD_IMPLANT_DT: NORMAL
MDC_IDC_LEAD_LOCATION: NORMAL
MDC_IDC_LEAD_LOCATION: NORMAL
MDC_IDC_LEAD_LOCATION_DETAIL_1: NORMAL
MDC_IDC_LEAD_LOCATION_DETAIL_1: NORMAL
MDC_IDC_LEAD_MFG: NORMAL
MDC_IDC_LEAD_MFG: NORMAL
MDC_IDC_LEAD_MODEL: NORMAL
MDC_IDC_LEAD_MODEL: NORMAL
MDC_IDC_LEAD_POLARITY_TYPE: NORMAL
MDC_IDC_LEAD_POLARITY_TYPE: NORMAL
MDC_IDC_LEAD_SERIAL: NORMAL
MDC_IDC_LEAD_SERIAL: NORMAL
MDC_IDC_MSMT_BATTERY_DTM: NORMAL
MDC_IDC_MSMT_BATTERY_DTM: NORMAL
MDC_IDC_MSMT_BATTERY_REMAINING_LONGEVITY: 78 MO
MDC_IDC_MSMT_BATTERY_REMAINING_LONGEVITY: 84 MO
MDC_IDC_MSMT_BATTERY_STATUS: NORMAL
MDC_IDC_MSMT_BATTERY_STATUS: NORMAL
MDC_IDC_MSMT_CAP_CHARGE_DTM: NORMAL
MDC_IDC_MSMT_CAP_CHARGE_DTM: NORMAL
MDC_IDC_MSMT_CAP_CHARGE_ENERGY: 41 J
MDC_IDC_MSMT_CAP_CHARGE_ENERGY: 41 J
MDC_IDC_MSMT_CAP_CHARGE_TIME: 7.92
MDC_IDC_MSMT_CAP_CHARGE_TIME: 7.92
MDC_IDC_MSMT_CAP_CHARGE_TIME: 9.38
MDC_IDC_MSMT_CAP_CHARGE_TIME: 9.38
MDC_IDC_MSMT_CAP_CHARGE_TYPE: NORMAL
MDC_IDC_MSMT_LEADCHNL_RV_IMPEDANCE_VALUE: 458 OHM
MDC_IDC_MSMT_LEADCHNL_RV_IMPEDANCE_VALUE: 461 OHM
MDC_IDC_MSMT_LEADCHNL_RV_PACING_THRESHOLD_AMPLITUDE: 0.8 V
MDC_IDC_MSMT_LEADCHNL_RV_PACING_THRESHOLD_AMPLITUDE: 0.9 V
MDC_IDC_MSMT_LEADCHNL_RV_PACING_THRESHOLD_PULSEWIDTH: 0.5 MS
MDC_IDC_MSMT_LEADCHNL_RV_PACING_THRESHOLD_PULSEWIDTH: 0.5 MS
MDC_IDC_MSMT_LEADCHNL_RV_SENSING_INTR_AMPL: 15.1 MV
MDC_IDC_MSMT_LEADCHNL_RV_SENSING_INTR_AMPL: 15.3 MV
MDC_IDC_PG_IMPLANT_DTM: NORMAL
MDC_IDC_PG_IMPLANT_DTM: NORMAL
MDC_IDC_PG_MFG: NORMAL
MDC_IDC_PG_MFG: NORMAL
MDC_IDC_PG_MODEL: NORMAL
MDC_IDC_PG_MODEL: NORMAL
MDC_IDC_PG_SERIAL: NORMAL
MDC_IDC_PG_SERIAL: NORMAL
MDC_IDC_PG_TYPE: NORMAL
MDC_IDC_PG_TYPE: NORMAL
MDC_IDC_SESS_CLINIC_NAME: NORMAL
MDC_IDC_SESS_CLINIC_NAME: NORMAL
MDC_IDC_SESS_DTM: NORMAL
MDC_IDC_SESS_DTM: NORMAL
MDC_IDC_SESS_TYPE: NORMAL
MDC_IDC_SESS_TYPE: NORMAL
MDC_IDC_SET_BRADY_HYSTRATE: NORMAL
MDC_IDC_SET_BRADY_HYSTRATE: NORMAL
MDC_IDC_SET_BRADY_LOWRATE: 40 {BEATS}/MIN
MDC_IDC_SET_BRADY_LOWRATE: 40 {BEATS}/MIN
MDC_IDC_SET_BRADY_MODE: NORMAL
MDC_IDC_SET_BRADY_MODE: NORMAL
MDC_IDC_SET_LEADCHNL_RV_PACING_AMPLITUDE: 2.4 V
MDC_IDC_SET_LEADCHNL_RV_PACING_AMPLITUDE: 2.4 V
MDC_IDC_SET_LEADCHNL_RV_PACING_CAPTURE_MODE: NORMAL
MDC_IDC_SET_LEADCHNL_RV_PACING_CAPTURE_MODE: NORMAL
MDC_IDC_SET_LEADCHNL_RV_PACING_POLARITY: NORMAL
MDC_IDC_SET_LEADCHNL_RV_PACING_POLARITY: NORMAL
MDC_IDC_SET_LEADCHNL_RV_PACING_PULSEWIDTH: 0.5 MS
MDC_IDC_SET_LEADCHNL_RV_PACING_PULSEWIDTH: 0.5 MS
MDC_IDC_SET_LEADCHNL_RV_SENSING_ADAPTATION_MODE: NORMAL
MDC_IDC_SET_LEADCHNL_RV_SENSING_ADAPTATION_MODE: NORMAL
MDC_IDC_SET_LEADCHNL_RV_SENSING_POLARITY: NORMAL
MDC_IDC_SET_LEADCHNL_RV_SENSING_POLARITY: NORMAL
MDC_IDC_SET_LEADCHNL_RV_SENSING_SENSITIVITY: 0.6 MV
MDC_IDC_SET_LEADCHNL_RV_SENSING_SENSITIVITY: 0.6 MV
MDC_IDC_SET_ZONE_DETECTION_INTERVAL: 240 MS
MDC_IDC_SET_ZONE_DETECTION_INTERVAL: 240 MS
MDC_IDC_SET_ZONE_DETECTION_INTERVAL: 300 MS
MDC_IDC_SET_ZONE_DETECTION_INTERVAL: 300 MS
MDC_IDC_SET_ZONE_DETECTION_INTERVAL: 400 MS
MDC_IDC_SET_ZONE_DETECTION_INTERVAL: 400 MS
MDC_IDC_SET_ZONE_TYPE: NORMAL
MDC_IDC_SET_ZONE_VENDOR_TYPE: NORMAL
MDC_IDC_STAT_BRADY_DTM_END: NORMAL
MDC_IDC_STAT_BRADY_DTM_END: NORMAL
MDC_IDC_STAT_BRADY_DTM_START: NORMAL
MDC_IDC_STAT_BRADY_DTM_START: NORMAL
MDC_IDC_STAT_BRADY_RV_PERCENT_PACED: 0 %
MDC_IDC_STAT_BRADY_RV_PERCENT_PACED: 1 %
MDC_IDC_STAT_EPISODE_RECENT_COUNT: 0
MDC_IDC_STAT_EPISODE_RECENT_COUNT: 5
MDC_IDC_STAT_EPISODE_RECENT_COUNT: 7
MDC_IDC_STAT_EPISODE_RECENT_COUNT: 807
MDC_IDC_STAT_EPISODE_RECENT_COUNT_DTM_END: NORMAL
MDC_IDC_STAT_EPISODE_RECENT_COUNT_DTM_START: NORMAL
MDC_IDC_STAT_EPISODE_TOTAL_COUNT: 40
MDC_IDC_STAT_EPISODE_TOTAL_COUNT: 40
MDC_IDC_STAT_EPISODE_TOTAL_COUNT: 5185
MDC_IDC_STAT_EPISODE_TOTAL_COUNT: 5185
MDC_IDC_STAT_EPISODE_TOTAL_COUNT: 61
MDC_IDC_STAT_EPISODE_TOTAL_COUNT: 61
MDC_IDC_STAT_EPISODE_TOTAL_COUNT_DTM_END: NORMAL
MDC_IDC_STAT_EPISODE_TOTAL_COUNT_DTM_START: NORMAL
MDC_IDC_STAT_EPISODE_TYPE: NORMAL
MDC_IDC_STAT_EPISODE_VENDOR_TYPE: NORMAL
MDC_IDC_STAT_TACHYTHERAPY_ATP_DELIVERED_RECENT: 0
MDC_IDC_STAT_TACHYTHERAPY_ATP_DELIVERED_RECENT: 0
MDC_IDC_STAT_TACHYTHERAPY_ATP_DELIVERED_TOTAL: 7
MDC_IDC_STAT_TACHYTHERAPY_ATP_DELIVERED_TOTAL: 7
MDC_IDC_STAT_TACHYTHERAPY_RECENT_DTM_END: NORMAL
MDC_IDC_STAT_TACHYTHERAPY_RECENT_DTM_END: NORMAL
MDC_IDC_STAT_TACHYTHERAPY_RECENT_DTM_START: NORMAL
MDC_IDC_STAT_TACHYTHERAPY_RECENT_DTM_START: NORMAL
MDC_IDC_STAT_TACHYTHERAPY_SHOCKS_ABORTED_RECENT: 0
MDC_IDC_STAT_TACHYTHERAPY_SHOCKS_ABORTED_RECENT: 2
MDC_IDC_STAT_TACHYTHERAPY_SHOCKS_ABORTED_TOTAL: 7
MDC_IDC_STAT_TACHYTHERAPY_SHOCKS_ABORTED_TOTAL: 7
MDC_IDC_STAT_TACHYTHERAPY_SHOCKS_DELIVERED_RECENT: 0
MDC_IDC_STAT_TACHYTHERAPY_SHOCKS_DELIVERED_RECENT: 0
MDC_IDC_STAT_TACHYTHERAPY_SHOCKS_DELIVERED_TOTAL: 19
MDC_IDC_STAT_TACHYTHERAPY_SHOCKS_DELIVERED_TOTAL: 19
MDC_IDC_STAT_TACHYTHERAPY_TOTAL_DTM_END: NORMAL
MDC_IDC_STAT_TACHYTHERAPY_TOTAL_DTM_END: NORMAL
O2/TOTAL GAS SETTING VFR VENT: 21 %
O2/TOTAL GAS SETTING VFR VENT: NORMAL %
O2/TOTAL GAS SETTING VFR VENT: NORMAL %
OXYHGB MFR BLDV: 59 %
OXYHGB MFR BLDV: 65 %
OXYHGB MFR BLDV: 70 %
PCO2 BLDV: 44 MM HG (ref 40–50)
PCO2 BLDV: 45 MM HG (ref 40–50)
PCO2 BLDV: 46 MM HG (ref 40–50)
PH BLDV: 7.35 PH (ref 7.32–7.43)
PH BLDV: 7.37 PH (ref 7.32–7.43)
PH BLDV: 7.37 PH (ref 7.32–7.43)
PLATELET # BLD AUTO: 188 10E9/L (ref 150–450)
PO2 BLDV: 33 MM HG (ref 25–47)
PO2 BLDV: 38 MM HG (ref 25–47)
PO2 BLDV: 40 MM HG (ref 25–47)
POTASSIUM SERPL-SCNC: 4.3 MMOL/L (ref 3.4–5.3)
POTASSIUM SERPL-SCNC: 4.7 MMOL/L (ref 3.4–5.3)
RBC # BLD AUTO: 3.75 10E12/L (ref 4.4–5.9)
SODIUM SERPL-SCNC: 137 MMOL/L (ref 133–144)
SODIUM SERPL-SCNC: 138 MMOL/L (ref 133–144)
WBC # BLD AUTO: 6.9 10E9/L (ref 4–11)

## 2020-07-16 PROCEDURE — 27810169 ZZH OR IMPLANT GENERAL: Performed by: THORACIC SURGERY (CARDIOTHORACIC VASCULAR SURGERY)

## 2020-07-16 PROCEDURE — 03H33DZ INSERTION OF INTRALUMINAL DEVICE INTO RIGHT SUBCLAVIAN ARTERY, PERCUTANEOUS APPROACH: ICD-10-PCS | Performed by: THORACIC SURGERY (CARDIOTHORACIC VASCULAR SURGERY)

## 2020-07-16 PROCEDURE — 40000081 ZZH STATISTIC INSERT IABP

## 2020-07-16 PROCEDURE — 85027 COMPLETE CBC AUTOMATED: CPT | Performed by: INTERNAL MEDICINE

## 2020-07-16 PROCEDURE — 82805 BLOOD GASES W/O2 SATURATION: CPT | Performed by: SURGERY

## 2020-07-16 PROCEDURE — 25000125 ZZHC RX 250: Performed by: STUDENT IN AN ORGANIZED HEALTH CARE EDUCATION/TRAINING PROGRAM

## 2020-07-16 PROCEDURE — 99233 SBSQ HOSP IP/OBS HIGH 50: CPT | Mod: 25 | Performed by: INTERNAL MEDICINE

## 2020-07-16 PROCEDURE — 40000048 ZZH STATISTIC DAILY SWAN MONITORING

## 2020-07-16 PROCEDURE — 40000014 ZZH STATISTIC ARTERIAL MONITORING DAILY

## 2020-07-16 PROCEDURE — 00000146 ZZHCL STATISTIC GLUCOSE BY METER IP

## 2020-07-16 PROCEDURE — 93287 PERI-PX DEVICE EVAL & PRGR: CPT | Mod: 26 | Performed by: INTERNAL MEDICINE

## 2020-07-16 PROCEDURE — 40000281 ZZH STATISTIC TRANSPORT TIME EA 15 MIN

## 2020-07-16 PROCEDURE — 36000070 ZZH SURGERY LEVEL 5 EA 15 ADDTL MIN - UMMC: Performed by: THORACIC SURGERY (CARDIOTHORACIC VASCULAR SURGERY)

## 2020-07-16 PROCEDURE — 36000072 ZZH SURGERY LEVEL 5 W FLUORO 1ST 30 MIN - UMMC: Performed by: THORACIC SURGERY (CARDIOTHORACIC VASCULAR SURGERY)

## 2020-07-16 PROCEDURE — 37000009 ZZH ANESTHESIA TECHNICAL FEE, EACH ADDTL 15 MIN: Performed by: THORACIC SURGERY (CARDIOTHORACIC VASCULAR SURGERY)

## 2020-07-16 PROCEDURE — 25000132 ZZH RX MED GY IP 250 OP 250 PS 637: Performed by: INTERNAL MEDICINE

## 2020-07-16 PROCEDURE — 71045 X-RAY EXAM CHEST 1 VIEW: CPT

## 2020-07-16 PROCEDURE — 25000132 ZZH RX MED GY IP 250 OP 250 PS 637: Performed by: SURGERY

## 2020-07-16 PROCEDURE — 80048 BASIC METABOLIC PNL TOTAL CA: CPT | Performed by: SURGERY

## 2020-07-16 PROCEDURE — 80048 BASIC METABOLIC PNL TOTAL CA: CPT | Performed by: INTERNAL MEDICINE

## 2020-07-16 PROCEDURE — 93287 PERI-PX DEVICE EVAL & PRGR: CPT

## 2020-07-16 PROCEDURE — 27210305 ZZH CATH BALLOON IABP

## 2020-07-16 PROCEDURE — 40000279 XR SURGERY CARM FLUORO GREATER THAN 5 MIN W STILLS: Mod: TC

## 2020-07-16 PROCEDURE — 82805 BLOOD GASES W/O2 SATURATION: CPT | Performed by: INTERNAL MEDICINE

## 2020-07-16 PROCEDURE — 27210794 ZZH OR GENERAL SUPPLY STERILE: Performed by: THORACIC SURGERY (CARDIOTHORACIC VASCULAR SURGERY)

## 2020-07-16 PROCEDURE — 85520 HEPARIN ASSAY: CPT | Performed by: SURGERY

## 2020-07-16 PROCEDURE — 83735 ASSAY OF MAGNESIUM: CPT | Performed by: INTERNAL MEDICINE

## 2020-07-16 PROCEDURE — 40000076 ZZH STATISTIC IABP MONITORING

## 2020-07-16 PROCEDURE — 37000008 ZZH ANESTHESIA TECHNICAL FEE, 1ST 30 MIN: Performed by: THORACIC SURGERY (CARDIOTHORACIC VASCULAR SURGERY)

## 2020-07-16 PROCEDURE — 25000132 ZZH RX MED GY IP 250 OP 250 PS 637: Performed by: STUDENT IN AN ORGANIZED HEALTH CARE EDUCATION/TRAINING PROGRAM

## 2020-07-16 PROCEDURE — 20000004 ZZH R&B ICU UMMC

## 2020-07-16 PROCEDURE — 40000275 ZZH STATISTIC RCP TIME EA 10 MIN

## 2020-07-16 PROCEDURE — 25000128 H RX IP 250 OP 636: Performed by: STUDENT IN AN ORGANIZED HEALTH CARE EDUCATION/TRAINING PROGRAM

## 2020-07-16 PROCEDURE — 25000566 ZZH SEVOFLURANE, EA 15 MIN: Performed by: THORACIC SURGERY (CARDIOTHORACIC VASCULAR SURGERY)

## 2020-07-16 PROCEDURE — 5A02210 ASSISTANCE WITH CARDIAC OUTPUT USING BALLOON PUMP, CONTINUOUS: ICD-10-PCS | Performed by: THORACIC SURGERY (CARDIOTHORACIC VASCULAR SURGERY)

## 2020-07-16 PROCEDURE — 40000196 ZZH STATISTIC RAPCV CVP MONITORING

## 2020-07-16 PROCEDURE — 85520 HEPARIN ASSAY: CPT | Performed by: INTERNAL MEDICINE

## 2020-07-16 PROCEDURE — 3E033GC INTRODUCTION OF OTHER THERAPEUTIC SUBSTANCE INTO PERIPHERAL VEIN, PERCUTANEOUS APPROACH: ICD-10-PCS | Performed by: THORACIC SURGERY (CARDIOTHORACIC VASCULAR SURGERY)

## 2020-07-16 DEVICE — GRAFT VASCUTEK GELWEAVE STRAIGHT 8MMX15CM 731508: Type: IMPLANTABLE DEVICE | Site: CHEST | Status: FUNCTIONAL

## 2020-07-16 RX ORDER — HYDROMORPHONE HYDROCHLORIDE 1 MG/ML
0.2 INJECTION, SOLUTION INTRAMUSCULAR; INTRAVENOUS; SUBCUTANEOUS
Status: DISCONTINUED | OUTPATIENT
Start: 2020-07-16 | End: 2020-07-20

## 2020-07-16 RX ORDER — HEPARIN SODIUM 5000 [USP'U]/.5ML
INJECTION, SOLUTION INTRAVENOUS; SUBCUTANEOUS PRN
Status: DISCONTINUED | OUTPATIENT
Start: 2020-07-16 | End: 2020-07-16

## 2020-07-16 RX ORDER — SODIUM CHLORIDE, SODIUM GLUCONATE, SODIUM ACETATE, POTASSIUM CHLORIDE AND MAGNESIUM CHLORIDE 526; 502; 368; 37; 30 MG/100ML; MG/100ML; MG/100ML; MG/100ML; MG/100ML
INJECTION, SOLUTION INTRAVENOUS CONTINUOUS PRN
Status: DISCONTINUED | OUTPATIENT
Start: 2020-07-16 | End: 2020-07-16

## 2020-07-16 RX ORDER — CEFAZOLIN SODIUM 2 G/100ML
INJECTION, SOLUTION INTRAVENOUS PRN
Status: DISCONTINUED | OUTPATIENT
Start: 2020-07-16 | End: 2020-07-16

## 2020-07-16 RX ORDER — FENTANYL CITRATE 50 UG/ML
INJECTION, SOLUTION INTRAMUSCULAR; INTRAVENOUS PRN
Status: DISCONTINUED | OUTPATIENT
Start: 2020-07-16 | End: 2020-07-16

## 2020-07-16 RX ORDER — OXYCODONE HYDROCHLORIDE 5 MG/1
5 TABLET ORAL EVERY 4 HOURS PRN
Status: DISCONTINUED | OUTPATIENT
Start: 2020-07-16 | End: 2020-07-20

## 2020-07-16 RX ORDER — DEXAMETHASONE SODIUM PHOSPHATE 4 MG/ML
INJECTION, SOLUTION INTRA-ARTICULAR; INTRALESIONAL; INTRAMUSCULAR; INTRAVENOUS; SOFT TISSUE PRN
Status: DISCONTINUED | OUTPATIENT
Start: 2020-07-16 | End: 2020-07-16

## 2020-07-16 RX ORDER — LIDOCAINE HYDROCHLORIDE 20 MG/ML
INJECTION, SOLUTION INFILTRATION; PERINEURAL PRN
Status: DISCONTINUED | OUTPATIENT
Start: 2020-07-16 | End: 2020-07-16

## 2020-07-16 RX ORDER — PROPOFOL 10 MG/ML
INJECTION, EMULSION INTRAVENOUS PRN
Status: DISCONTINUED | OUTPATIENT
Start: 2020-07-16 | End: 2020-07-16

## 2020-07-16 RX ORDER — ONDANSETRON 2 MG/ML
INJECTION INTRAMUSCULAR; INTRAVENOUS PRN
Status: DISCONTINUED | OUTPATIENT
Start: 2020-07-16 | End: 2020-07-16

## 2020-07-16 RX ADMIN — FENTANYL CITRATE 100 MCG: 50 INJECTION, SOLUTION INTRAMUSCULAR; INTRAVENOUS at 11:01

## 2020-07-16 RX ADMIN — ROCURONIUM BROMIDE 50 MG: 10 INJECTION INTRAVENOUS at 11:01

## 2020-07-16 RX ADMIN — ACETAMINOPHEN 650 MG: 325 TABLET, FILM COATED ORAL at 17:06

## 2020-07-16 RX ADMIN — SUGAMMADEX 200 MG: 100 INJECTION, SOLUTION INTRAVENOUS at 13:25

## 2020-07-16 RX ADMIN — EPINEPHRINE 5 MCG: 1 INJECTION PARENTERAL at 13:03

## 2020-07-16 RX ADMIN — ROCURONIUM BROMIDE 20 MG: 10 INJECTION INTRAVENOUS at 12:12

## 2020-07-16 RX ADMIN — Medication 5000 UNITS: at 12:04

## 2020-07-16 RX ADMIN — EPINEPHRINE 10 MCG: 1 INJECTION PARENTERAL at 11:10

## 2020-07-16 RX ADMIN — Medication 0.5 UNITS: at 12:08

## 2020-07-16 RX ADMIN — DEXAMETHASONE SODIUM PHOSPHATE 4 MG: 4 INJECTION, SOLUTION INTRA-ARTICULAR; INTRALESIONAL; INTRAMUSCULAR; INTRAVENOUS; SOFT TISSUE at 11:31

## 2020-07-16 RX ADMIN — ATORVASTATIN CALCIUM 40 MG: 40 TABLET, FILM COATED ORAL at 08:37

## 2020-07-16 RX ADMIN — ONDANSETRON 4 MG: 2 INJECTION INTRAMUSCULAR; INTRAVENOUS at 13:23

## 2020-07-16 RX ADMIN — PROPOFOL 50 MG: 10 INJECTION, EMULSION INTRAVENOUS at 11:01

## 2020-07-16 RX ADMIN — Medication 2 G: at 11:28

## 2020-07-16 RX ADMIN — Medication 1 UNITS: at 11:10

## 2020-07-16 RX ADMIN — AMIODARONE HYDROCHLORIDE 200 MG: 200 TABLET ORAL at 08:38

## 2020-07-16 RX ADMIN — EPINEPHRINE 10 MCG: 1 INJECTION PARENTERAL at 11:58

## 2020-07-16 RX ADMIN — LIDOCAINE HYDROCHLORIDE 100 MG: 20 INJECTION, SOLUTION INFILTRATION; PERINEURAL at 11:01

## 2020-07-16 RX ADMIN — SODIUM CHLORIDE, SODIUM GLUCONATE, SODIUM ACETATE, POTASSIUM CHLORIDE AND MAGNESIUM CHLORIDE: 526; 502; 368; 37; 30 INJECTION, SOLUTION INTRAVENOUS at 11:01

## 2020-07-16 RX ADMIN — EPINEPHRINE 10 MCG: 1 INJECTION PARENTERAL at 13:05

## 2020-07-16 ASSESSMENT — MIFFLIN-ST. JEOR: SCORE: 1459

## 2020-07-16 ASSESSMENT — ACTIVITIES OF DAILY LIVING (ADL)
ADLS_ACUITY_SCORE: 14

## 2020-07-16 ASSESSMENT — ENCOUNTER SYMPTOMS: DYSRHYTHMIAS: 1

## 2020-07-16 NOTE — ANESTHESIA POSTPROCEDURE EVALUATION
Anesthesia POST Procedure Evaluation    Patient: Lucian Henderson .   MRN:     [id number] Gender:   male   Age:    64 year old :      1955        Preoperative Diagnosis: Heart failure (H) [I50.9]   Procedure(s):  Subclavian Intra-Aortic Balloon Pump Placement   Postop Comments: No value filed.     Anesthesia Type: General       Disposition: ICU            ICU Sign Out: Unable to perform physician to physician sign out   Postop Pain Control: Uneventful            Sign Out: Well controlled pain   PONV: No   Neuro/Psych: Uneventful            Sign Out: Acceptable/Baseline neuro status   Airway/Respiratory: Uneventful            Sign Out: Acceptable/Baseline resp. status   CV/Hemodynamics: Uneventful            Sign Out: Acceptable CV status   Other NRE: NONE   DID A NON-ROUTINE EVENT OCCUR? No    Event details/Postop Comments:  Report given that patient should have the ICD tachytherapies turned back on.          Last Anesthesia Record Vitals:  CRNA VITALS  2020 1313 - 2020 1413      2020             ART BP:  --          Last PACU Vitals:  Vitals Value Taken Time   BP     Temp     Pulse     Resp     SpO2 95 % 2020  3:16 PM   Temp src     NIBP     Pulse     SpO2     Resp     Temp     Ht Rate     Temp 2     Vitals shown include unvalidated device data.      Electronically Signed By: Warren Sellers MD, 2020, 3:17 PM

## 2020-07-16 NOTE — ANESTHESIA CARE TRANSFER NOTE
Patient: Lucian Henderson Sr.    Procedure(s):  Subclavian Intra-Aortic Balloon Pump Placement    Diagnosis: Heart failure (H) [I50.9]  Diagnosis Additional Information: No value filed.    Anesthesia Type:   General     Note:  Airway :Face Mask  Patient transferred to:ICU  Comments: Patient is Awake, VSS, following commands. Patient is breathing Spontaneously with face mask. No obvious complications from anesthesia. Patient signed out to ICU RN.    Angel Meza DO.  Anesthesiology Resident CA-2, PGY-3  Pager [phone]  ICU Handoff: Call for PAUSE to initiate/utilize ICU HANDOFF, Identified Patient, Identified Responsible Provider, Reviewed the Pertinent Medical History, Discussed Surgical Course, Reviewed Intra-OP Anesthesia Management and Issues during Anesthesia, Set Expectations for Post Procedure Period and Allowed Opportunity for Questions and Acknowledgement of Understanding      Vitals: (Last set prior to Anesthesia Care Transfer)    CRNA VITALS  7/16/2020 1313 - 7/16/2020 1413      7/16/2020             ART BP:  --      110/75  75  SR  96%             Electronically Signed By: Angel Meza DO  July 16, 2020  2:20 PM

## 2020-07-16 NOTE — PROGRESS NOTES
[facility]   Cardiology II Service / Advanced Heart Failure  Progress note    Lucian Morillo  : 1955  MRN # [id number]    ADMIT DATE: 7/3/2020    Changes today  - s/p subclavian IABP   - continue dopamine 2.5 mcg/kg/min   - hold diuretics this morning, readdress later based on CVP   - continue to trend hemodynamics     ASSESSMENT & PLAN:  Lucian Henderson Sr. is a 64 year old male with a PMH of HTN, DMII, obesity, CKD, CHANTEL, ischemic cardiomyopathy and severe LV systolic dysfunction s/p 3V CABG () and primary prevention ICD ( 4/2/15). Currently he presents with 3 days of worsening fatigue and SOB with minimal exertion. Patient claims he could walk 1-2 blocks last week but has been barely able to ambulate home recently.     Admitted for possible LVAD/Transplant work up. IABP and listed status 2 for heart transplant .    Date RA MPAP PCWP SV02 Jacy CO Jacy CI MAP SVR PVR Intervention   20 8 50 38 49 2.3 1.28 89 2400 5.2    20  6 48 28 35 4.0 2.1 69 1300 2.2 Nipride, PO afterload reduction    7/10 9 56/34 32 68 5.1 2.7 68 900 1.8 Nipride 1, PO afterload reduction    14 65/42/50 36 60 4.3 2.2 89 1100 1.8 Nipride 0.25, PO afterload reduction    7/15 8 52/28/36 24 66 4.1 2.2 100 1200 2.9 IABP 1:1, PO afterload reduction     7 50/24/33 24 70 5.6 3.0 94 1200 1.6 IABP 1:1, Dopamine 2.5       # ICM s/p 3V CABG () and primary prevention ICD ( 4/2/15)  #HFrEF Stage D, NYHA III-IV  # Ambulatory cardiogenic shock  # Arrhythmia: HF associated VT/VF  Currently he presents with 3 days of worsening fatigue and SOB with minimal exertion, cardiac cachexia and cold extremities. Admitted for possible LVAD/Transplant work up. - Echocardiogram showed an EF of 15% on 20  - Diuresis : lasix 40 mg IV BID. Changed to PO   - ASA 81 mg daily  - Atorvastatin 40 daily  - Hydralazine 75 TID, IMDUR 60 mg (held 7/15)  - Dopamine 2.5 (started 7/15)   - Stop losartan  "7/12  - He has had a colonoscopy on 8/7/19    - IABP placement tentatively on Tuesday   - NPO at [facility] for dental extraction as part of pre-heart tx work-up   - s/p tooth extractions 7/13  - 24 hours Augmentin for zander-op ppx given teeth removal and plan for IABP   - IABP and listed status 2 for heart transplant 7/14, s/p transition from femoral to subclavian IABP 7/16    #Atrial Flutter with RVR  Patient went into Atrial flutter with RVR with rates around 150 overnight on 7/7/2020 at around 2 am. 5 mg IV beta-blocker was administered but this significantly lowered the patient's blood pressure.  - 5 mg IV beta-blocker administered and heparin started on 7/7/20 over night when patient went into flutter.    - Stopped Lasix, 250 ml LR Bolus (7/6/20)  - IV Amiodarone bolus 150 , followed by 1mg/min infusion for 6h followed by 0.5mg/min, started amiodarone 400 mg PO BID 7/9 (will do BID dosing for ~ 5 days, 7/14 can go to daily dosing)   - Digoxin  mcg (7/6/20)    # DMII ( A1c: 8.3)   - Finger sticks  - Hypoglycemia protocol   - Moderate intensity sliding scale insulin       #CKD  - Monitor Scr and K Q12h   - Avoid nephrotoxic agents    Floor Care  Fluids: 1.5L fluid restriction  Food: 2 gram sodium diet, low fat diet  Electrolyte repletion: potassium/magnesium sliding scale  DVT ppx: Lovenox  Glycemic Control: sliding scale insulin   Lines: PICC   Consults: none  Code Status: full    Patient was discussed with attending physician, Dr. Anand.     Héctor Smith MD   Cardiology PGY-5  ..........................................................................................................................................................  Subjective:  SARITA overnight. Nurses notes reviewed.   Reported doing well this morning. Denies SOB or chest pain.      PHYSICAL EXAM:  /50   Pulse 79   Temp 98.9  F (37.2  C) (Oral)   Resp 16   Ht 1.626 m (5' 4\")   Wt 76.8 kg (169 lb 5 oz)   SpO2 100%   BMI 29.06 " kg/m    GENERAL: NAD  NECK: Supple and without lymphadenopathy. JVP 10 cm  CV: S1/S2 heard without murmur   RESPIRATORY: CTAB  GI: Soft and distended. No tenderness, rebound, guarding. No palpable organomegaly.   EXTREMITIES: Peripheral edema trace.   NEUROLOGIC: Alert and orientated x 3.   MUSCULOSKELETAL: No joint swelling or tenderness.   SKIN: No jaundice. No acute rashes or lesions.     ROS:   12-pt ROS otherwise negative except as noted above    PMH:  Past Medical History:   Diagnosis Date     CAD (coronary artery disease)      CHF (congestive heart failure) (H)      CKD (chronic kidney disease), stage III (H)      Cortical cataract of both eyes      Diabetes (H)      Hyperlipidemia      Hypertension      Ischemic cardiomyopathy      Obesity      CHANTEL (obstructive sleep apnea)     occas cpap     Osteoarthritis        PSH:  Past Surgical History:   Procedure Laterality Date     C CABG, ARTERY-VEIN, THREE  02/2008     CATARACT IOL, RT/LT       COLONOSCOPY N/A 8/7/2019    Procedure: COLONOSCOPY, WITH POLYPECTOMY AND BIOPSY;  Surgeon: Chauncey Morataya MD;  Location:  GI     CV RIGHT HEART CATH N/A 3/25/2019    Procedure: CV RIGHT HEART CATH;  Surgeon: Moises Santso MD;  Location:  HEART CARDIAC CATH LAB     CV RIGHT HEART CATH N/A 7/10/2019    Procedure: CV RIGHT HEART CATH;  Surgeon: Jak Mccabe MD;  Location:  HEART CARDIAC CATH LAB     CV RIGHT HEART CATH N/A 7/8/2020    Procedure: Right Heart Cath with Leave In [place] already has ICU load;  Surgeon: Jak Mccabe MD;  Location:  HEART CARDIAC CATH LAB     EXTRACTION(S) DENTAL Left 7/13/2020    Procedure: EXTRACTION, TOOTH #11, 12, 13, 15, and 29;  Surgeon: Monica Chao DDS;  Location:  OR     IMPLANT AUTOMATIC IMPLANTABLE CARDIOVERTER DEFIBRILLATOR       PHACOEMULSIFICATION CLEAR CORNEA WITH STANDARD IOL, VITRECTOMY PARSPLANA 25 GAGUE, COMBINED Left 12/10/2019    Procedure: PHACOEMULSIFICATION, CATARACT, CLEAR CORNEAL INCISION  APPROACH, W STD INTRAOCULAR LENS IMPLANT INSERT + VITRECTOMY BY PARS PLANA  USING 25-GAUGE INSTRUMENTS. ENDOLASER, INFUSION OF 20% SF6 GAS;  Surgeon: Triny Bunch MD;  [facility]: [facility]     PICC INSERTION Right 2020    basilic 44 cm total        MEDICATIONS:  Prior to Admission Medications   Prescriptions Last Dose Informant Patient Reported? Taking?   aspirin 81 MG tablet  Self No No   Sig: Take 1 tablet (81 mg) by mouth daily   atorvastatin (LIPITOR) 40 MG tablet  Self No No   Sig: Take 1 tablet (40 mg) by mouth daily   blood glucose (ACCU-CHEK SMARTVIEW) test strip  Self No No   Sig: USE TO TEST THREE TIMES DAILY AS DIRECTED   blood glucose (NO BRAND SPECIFIED) test strip  Self No No   Sig: Use to test blood sugar 2 times daily or as directed.   blood glucose monitoring (ACCU-CHEK FELIPE SMARTVIEW) meter device kit  Self No No   Sig: Use to test blood sugar 3-4 times daily, as directed.   blood glucose monitoring (ONE TOUCH DELICA) lancets  Self No No   Sig: Use to test blood sugars 2 times daily.   clopidogrel (PLAVIX) 75 MG tablet  Self No No   Sig: Take 1 tablet (75 mg) by mouth daily   exenatide ER (BYDUREON) 2 MG pen  Self No No   Sig: Inject 2 mg Subcutaneous every 7 days   furosemide (LASIX) 20 MG tablet  Self No No   Sig: Take 2 tablets (40 mg) by mouth 2 times daily   glimepiride (AMARYL) 4 MG tablet  Self No No   Sig: Take 1 tablet (4 mg) by mouth every morning (before breakfast)   hydrALAZINE (APRESOLINE) 25 MG tablet  Self No No   Sig: Take 1 tablet (25 mg) by mouth 3 times daily   insulin glargine (LANTUS SOLOSTAR) 100 UNIT/ML pen  Self No No   Sig: Take 8 units in the morning and 40 units in the evening   insulin pen needle (B-D U/F) 31G X 5 MM miscellaneous  Self No No   Si Units by Device route 2 times daily Use 2 pen needles daily or as directed.   isosorbide mononitrate (IMDUR) 60 MG 24 hr tablet  Self No No   Sig: Take 1 tablet (60 mg) by mouth daily   losartan (COZAAR) 50 MG  tablet  Spouse/Significant Other No No   Sig: Take 1 tablet (50 mg) by mouth daily   nitroglycerin (NITROSTAT) 0.4 MG SL tablet  Self No No   Sig: Place 1 tablet (0.4 mg) under the tongue every 5 minutes as needed for chest pain If you are still having symptoms after 3 doses (15 minutes) call 911.   order for DME  Self No No   Sig: Auto CPAP 8-15 cmH20      Facility-Administered Medications Last Administration Doses Remaining   erythromycin (ROMYCIN) ophthalmic ointment None recorded    lidocaine (PF) (XYLOCAINE) 2 % injection 10 mL None recorded 1           ALLERGIES:     Allergies   Allergen Reactions     No Known Drug Allergies        FAMILY HISTORY:  Family History   Problem Relation Age of Onset     Diabetes Brother      Diabetes Sister      Diabetes Sister      Macular Degeneration No family hx of      Glaucoma No family hx of      Myocardial Infarction No family hx of      Kidney Disease No family hx of        SOCIAL HISTORY:  Social History     Socioeconomic History     Marital status:      Spouse name: Not on file     Number of children: 4     Years of education: Not on file     Highest education level: Not on file   Occupational History     Occupation:      Employer: [employer]     Employer: RETIRED   Social Needs     Financial resource strain: Not on file     Food insecurity     Worry: Not on file     Inability: Not on file     Transportation needs     Medical: Not on file     Non-medical: Not on file   Tobacco Use     Smoking status: Never Smoker     Smokeless tobacco: Never Used     Tobacco comment: Never smoked; non-smoking household   Substance and Sexual Activity     Alcohol use: No     Alcohol/week: 0.0 standard drinks     Drug use: No     Sexual activity: Yes     Partners: Female   Lifestyle     Physical activity     Days per week: Not on file     Minutes per session: Not on file     Stress: Not on file   Relationships     Social connections     Talks on phone: Not on file      Gets together: Not on file     Attends Denominational service: Not on file     Active member of club or organization: Not on file     Attends meetings of clubs or organizations: Not on file     Relationship status: Not on file     Intimate partner violence     Fear of current or ex partner: Not on file     Emotionally abused: Not on file     Physically abused: Not on file     Forced sexual activity: Not on file   Other Topics Concern     Parent/sibling w/ CABG, MI or angioplasty before 65F 55M? No   Social History Narrative     Not on file       LABS:  BMP  Recent Labs   Lab 07/16/20  0350 07/15/20  1522 07/15/20  0352 07/14/20  1518    138 137 137   POTASSIUM 4.3 4.3 4.1 4.6   CHLORIDE 108 106 106 104   CO2 25 27 26 25   BUN 32* 36* 37* 36*   CR 1.73* 1.86* 1.89* 2.06*   * 273* 141* 129*   BRYANNA 8.8 8.4* 8.6 8.9     CBC  Recent Labs   Lab 07/16/20  0350 07/15/20  0352 07/14/20  2308 07/14/20  0432   WBC 6.9 6.6 6.0 7.7   HGB 11.4* 11.0* 10.4* 11.4*   HCT 36.4* 35.0* 33.0* 35.6*   MCV 97 98 98 96    218 218 247     INR  Recent Labs   Lab 07/13/20  0351   INR 1.05     IMAGING:    RHC 6/18/20     RA 20/26/18  RV 85/28  PA 82/45/58  PCWP 45  Cardiac output by Jacy: 3.85 L/min (indexed to 2.09 L/min/m2)  Cardiac output by thermodilution: 3.63 L/min (indexed to 1.97 L/min/m2)  SVR (by TD CO): 1123  PVR (by TD CO): 7.4    Angiogram 6/18/20   3 vessel CAD s/p 3V CABG in 2008 (LIMA-LAD, SVG-OM1, SVG-RPDA)  2. Known proximal SVG-RPDA occlusion  3. Presumed occlusion of SVG-OM1 (unable to locate on non-selective aortic root shot)  4. Patent LIMA-LAD with collateralization of LAD to PDA    Echocardiogram ( 3/11/2019)   Moderate to severe left ventricular dilation is present.  Severely (EF 10-20%) reduced left ventricular function is present.  No significant valve dysfunction.  Compared to study done 6/5/17 there is no significant change in LV size and  systolic function    Echocardiogram ( 7/5/2020)    Interpretation Summary  Severely (EF 10-20%) reduced left ventricular function is present. LVEF 15%  based on biplane 2D tracing. Severe left ventricular dilation is present.  LVIDd:6.8 cm. Grade III or advanced diastolic dysfunction.  The right ventricle is normal size.  Global right ventricular function is moderately reduced.  No significant valvular abnormalities were noted.  IVC diameter and respiratory changes fall into an intermediate range  suggesting an RA pressure of 8 mmHg.  Mild pulmonary hypertension is present.     This study was compared with the study from 3/25/2019. RV function has  worsened, LV size and function appear similar.    Devices  ICD ([employer]- 4/2/2015 )

## 2020-07-16 NOTE — ANESTHESIA PROCEDURE NOTES
Arterial Line Procedure Note  Staff -   Anesthesiologist:  Warren Sellers MD  Resident/Fellow: Angel Meza DO    Performed By: resident  Procedure performed by resident/CRNA in presence of a teaching physician.          Location: In OR Before Induction  Procedure Start/Stop Times:      7/16/2020 10:55 AM     7/16/2020 11:00 AM    patient identified, IV checked, site marked, risks and benefits discussed, informed consent, monitors and equipment checked, pre-op evaluation and at physician/surgeon's request      Correct Patient: Yes      Correct Position: Yes      Correct Site: Yes      Correct Procedure: Yes      Correct Laterality:  Yes    Site Marked:  Yes  Line Placement:     Procedure:  Arterial Line    Insertion Site:  Radial    Insertion laterality:  Left    Skin Prep: Chloraprep      Patient Prep: mask, sterile gloves, hat and hand hygiene      Local skin infiltration:  2% lidocaine    amount (mL):  2    Ultrasound Guided?: Yes      Artery evaluated via ultrasound confirming patency.   Using realtime imaging, the artery was punctured and the needle was observed entering the artery.      A permanent image is NOT entered into the patient's record.      Catheter size:  20 gauge, 12 cm    Dressing:  Tegaderm    Complications:  None obvious    Arterial waveform: Yes      IBP within 10% of NIBP: Yes

## 2020-07-16 NOTE — BRIEF OP NOTE
[facility], [place]    Brief Operative Note    Pre-operative diagnosis: Heart failure (H) [I50.9]  Post-operative diagnosis Same as pre-operative diagnosis    Procedure: Procedure(s):  Subclavian Intra-Aortic Balloon Pump Placement  Surgeon: Surgeon(s) and Role:     * Allan Sparrow MD - Primary     * Gerardo Cevallos MD - Resident - Assisting     * Giorgio Corral MD - Fellow - Assisting  Anesthesia: General   Estimated blood loss: 50cc  Drains: None  Specimens: * No specimens in log *  Findings:   None.  Complications: None.  Implants:   Implant Name Type Inv. Item Serial No.  Lot No. LRB No. Used Action   GRAFT VASCUTEK GELWEAVE STRAIGHT [id number] Graft GRAFT VASCUTEK GELWEAVE STRAIGHT [id number] VASCUTEK [id number] Right 1 Implanted

## 2020-07-16 NOTE — PROGRESS NOTES
Transplant Social Work Services Progress Note      Date of Initial Social Work Evaluation: 7/9/2019, 7/6/2020  Collaborated with: Ariel (Allison Valencia), pt and pt's wife    Data: Pt is currently listed as status 2 for heart transplant. Used  for visit. Pt going down to OR for new IABP this morning. Clarified pt's signature on HCD w/ what we have pt listed as. Pt has signed all documents with only the last name Tee and his state ID only has Tee as the last name. Discussed with Ariel and HCD approved for pt's chart and it is now scanned into the EMR. Writer did obtain copy of pt's state ID and sent it to ariel.     Intervention: Completion of HCD  Assessment: HCD has been approved for pt's medical record.   Education provided by ROSIBEL: Ongoing Social Work support, HCD  Plan:    Discharge Plans in Progress: None     Barriers to d/c plan: Pt currently listed for heart transplant    Follow up Plan: SW to continue to follow.

## 2020-07-16 NOTE — PLAN OF CARE
ICU End of Shift Summary. See flowsheets for vital signs and detailed assessment.    Changes this shift:     Neuro: Alert and oriented x4, able to make needs known. Denied pain overnight.  Cardiac: On IABP, stable. Dopamine gtt continued, MAP >65. Afebrile. See flowsheet for LIO.  Resp: On 2L NC overnight to maintain O2 stat >92% while sleeping. Denied difficulty breathing.   GI/: Around the beginning of shift, patient had one episode of emesis that he said was because of his dinner.  Emesis was mostly clear/brown/tan and what appeared to be undigested foods. Denied offer for antiemetic but will let writer know if he changes his mind.  NPO at midnight for today's procedure. Uses urinal; urine yellow/straw.  No further complaints.    Plan: OR today for new IABP line. Continue to monitor hemodynamics and follow POC.

## 2020-07-16 NOTE — OP NOTE
OPERATIVE DATE: 7/16/2020    PRE-OPERATIVE DIAGNOSIS:  1) Ischemic cardiomyopathy  2) Cardiogenic shock  3) End stage heart failure  4) Coronary artery disease s/p coronary artery bypass surgery  5) Atrial flutter with rapid ventricular response  6) Diabetes mellitus type 2  7) Hypertension  8) Obesity  9) Chronic kidney disease  10) Obstructive sleep apnea    POST-OPERATIVE DIAGNOSIS:  1) Ischemic cardiomyopathy  2) Cardiogenic shock  3) End stage heart failure  4) Coronary artery disease s/p coronary artery bypass surgery  5) Atrial flutter with rapid ventricular response  6) Diabetes mellitus type 2  7) Hypertension  8) Obesity  9) Chronic kidney disease  10) Obstructive sleep apnea    PROCEDURE:  1) Right subclavian chimney graft - 8mm Gelweave graft with 9F sheath  2) Placement of 50 ml intra-aortic balloon pump with fluoroscopy and interpretation of images    SURGEON: Allan Sparrow MD    ASSISTANT: Giorgio Corral MD & Gerardo Cevallos MD    ANESTHESIA: GETA    ESTIMATED BLOOD LOSS: 50 cc    OPERATIVE FINDINGS:  1) Right subclavian intra-aortic balloon pump placed at the level of the left main stem bronchus. Femoral IABP removed.     INDICATIONS:  Mr. BUCK GRAHAM SR. is a 64 year old male admitted with ischemic cardiomyopathy and end stage heart disease with cardiogenic shock. He is listed status 2 for heart transplant.  We were asked to evaluate for repositioning of femoral IABP to subclavian to allow ongoing rehabilitation and ambulation.  Risks and benefits of the operation were explained to the patient and their family including, but not limited to, bleeding, infection, stroke and even death.  They understood these risks and agreed to proceed electively.    OPERATIVE REPORT:  The patient was transferred to the operating room and positioned supine on the OR table.  General anesthesia was initiated by the anesthesia team.  Endotracheal intubation and central venous access was performed by  anesthesia.  The patients neck, chest, abdomen and bilateral lower extremities were clipped, prepped and draped in sterile fashion.  A pre-procedure time-out was performed confirming the correct patient, correct site and correct procedure.    A right infraclavicular incision was made and carried down through the subcutaneous tissue and pectoralis major. The pectoralis minor was identified and mobilized laterally.  The axillary artery was exposed.  5000 units IV heparin was administered.  The artery was clamped with vascular clamps.  An arteriotomy was made.  The 8mm Gelweave graft was anastomosed to the artery in end-to-side fashion using running 5-0 prolene.  The anastomosis was tested by removing the clamps and hemostatic.  Next a 9F sheath was secured in the end of the Gelweave graft.     The right femoral IABP was removed and made hemostatic with pressure for 30 minutes.    Fluoroscopy with interpretation was used to position the guidewire in the descending thoracic aorta.  A 6 Fr JR-4 catheter was used to place an 0.025 J wire with Seldinger technique into the descending thoracic aorta and passed into the abdominal aorta.  The catheter was removed and exchanged for the IABP over the wire.  Balloon pump augmentation was initiated with good waveforms.    The incision was closed in layers using vicryl stitches.    The patient was then transferred from the operating bed to an ICU bed and transferred to the ICU in critical, but stable, condition. He was extubated at the end of the operation.    All needle, sponge and instrument counts were correct at the end of the case.    Allan Sparrow MD  Cardiothoracic Surgery  [phone]

## 2020-07-17 ENCOUNTER — APPOINTMENT (OUTPATIENT)
Dept: GENERAL RADIOLOGY | Facility: CLINIC | Age: 65
DRG: 001 | End: 2020-07-17
Attending: SURGERY
Payer: COMMERCIAL

## 2020-07-17 ENCOUNTER — APPOINTMENT (OUTPATIENT)
Dept: PHYSICAL THERAPY | Facility: CLINIC | Age: 65
DRG: 001 | End: 2020-07-17
Attending: INTERNAL MEDICINE
Payer: COMMERCIAL

## 2020-07-17 ENCOUNTER — APPOINTMENT (OUTPATIENT)
Dept: GENERAL RADIOLOGY | Facility: CLINIC | Age: 65
DRG: 001 | End: 2020-07-17
Attending: INTERNAL MEDICINE
Payer: COMMERCIAL

## 2020-07-17 LAB
ALBUMIN UR-MCNC: 10 MG/DL
ANION GAP SERPL CALCULATED.3IONS-SCNC: 5 MMOL/L (ref 3–14)
ANION GAP SERPL CALCULATED.3IONS-SCNC: 7 MMOL/L (ref 3–14)
APPEARANCE UR: CLEAR
BASE DEFICIT BLDV-SCNC: 0.2 MMOL/L
BASE DEFICIT BLDV-SCNC: 0.2 MMOL/L
BASE DEFICIT BLDV-SCNC: 0.6 MMOL/L
BASE DEFICIT BLDV-SCNC: 0.9 MMOL/L
BASE DEFICIT BLDV-SCNC: 1 MMOL/L
BASE EXCESS BLDV CALC-SCNC: 0.2 MMOL/L
BILIRUB UR QL STRIP: NEGATIVE
BUN SERPL-MCNC: 39 MG/DL (ref 7–30)
BUN SERPL-MCNC: 40 MG/DL (ref 7–30)
CALCIUM SERPL-MCNC: 8 MG/DL (ref 8.5–10.1)
CALCIUM SERPL-MCNC: 8.4 MG/DL (ref 8.5–10.1)
CHLORIDE SERPL-SCNC: 109 MMOL/L (ref 94–109)
CHLORIDE SERPL-SCNC: 109 MMOL/L (ref 94–109)
CO2 SERPL-SCNC: 21 MMOL/L (ref 20–32)
CO2 SERPL-SCNC: 22 MMOL/L (ref 20–32)
COLOR UR AUTO: YELLOW
CREAT SERPL-MCNC: 1.61 MG/DL (ref 0.66–1.25)
CREAT SERPL-MCNC: 1.74 MG/DL (ref 0.66–1.25)
ERYTHROCYTE [DISTWIDTH] IN BLOOD BY AUTOMATED COUNT: 15 % (ref 10–15)
ERYTHROCYTE [DISTWIDTH] IN BLOOD BY AUTOMATED COUNT: 15.1 % (ref 10–15)
GFR SERPL CREATININE-BSD FRML MDRD: 40 ML/MIN/{1.73_M2}
GFR SERPL CREATININE-BSD FRML MDRD: 44 ML/MIN/{1.73_M2}
GLUCOSE BLDC GLUCOMTR-MCNC: 165 MG/DL (ref 70–99)
GLUCOSE BLDC GLUCOMTR-MCNC: 182 MG/DL (ref 70–99)
GLUCOSE BLDC GLUCOMTR-MCNC: 230 MG/DL (ref 70–99)
GLUCOSE BLDC GLUCOMTR-MCNC: 262 MG/DL (ref 70–99)
GLUCOSE BLDC GLUCOMTR-MCNC: 288 MG/DL (ref 70–99)
GLUCOSE BLDC GLUCOMTR-MCNC: 297 MG/DL (ref 70–99)
GLUCOSE SERPL-MCNC: 192 MG/DL (ref 70–99)
GLUCOSE SERPL-MCNC: 292 MG/DL (ref 70–99)
GLUCOSE UR STRIP-MCNC: >1000 MG/DL
HCO3 BLDV-SCNC: 25 MMOL/L (ref 21–28)
HCO3 BLDV-SCNC: 26 MMOL/L (ref 21–28)
HCT VFR BLD AUTO: 33.6 % (ref 40–53)
HCT VFR BLD AUTO: 34.7 % (ref 40–53)
HGB BLD-MCNC: 10.5 G/DL (ref 13.3–17.7)
HGB BLD-MCNC: 10.9 G/DL (ref 13.3–17.7)
HGB UR QL STRIP: ABNORMAL
HYALINE CASTS #/AREA URNS LPF: 1 /LPF (ref 0–2)
KETONES UR STRIP-MCNC: NEGATIVE MG/DL
LEUKOCYTE ESTERASE UR QL STRIP: ABNORMAL
LMWH PPP CHRO-ACNC: 0.26 IU/ML
MCH RBC QN AUTO: 30.3 PG (ref 26.5–33)
MCH RBC QN AUTO: 30.3 PG (ref 26.5–33)
MCHC RBC AUTO-ENTMCNC: 31.3 G/DL (ref 31.5–36.5)
MCHC RBC AUTO-ENTMCNC: 31.4 G/DL (ref 31.5–36.5)
MCV RBC AUTO: 96 FL (ref 78–100)
MCV RBC AUTO: 97 FL (ref 78–100)
MUCOUS THREADS #/AREA URNS LPF: PRESENT /LPF
NITRATE UR QL: NEGATIVE
O2/TOTAL GAS SETTING VFR VENT: 21 %
O2/TOTAL GAS SETTING VFR VENT: NORMAL %
OXYHGB MFR BLDV: 52 %
OXYHGB MFR BLDV: 56 %
OXYHGB MFR BLDV: 58 %
OXYHGB MFR BLDV: 58 %
OXYHGB MFR BLDV: 62 %
OXYHGB MFR BLDV: 66 %
PCO2 BLDV: 42 MM HG (ref 40–50)
PCO2 BLDV: 43 MM HG (ref 40–50)
PCO2 BLDV: 44 MM HG (ref 40–50)
PCO2 BLDV: 45 MM HG (ref 40–50)
PCO2 BLDV: 46 MM HG (ref 40–50)
PCO2 BLDV: 46 MM HG (ref 40–50)
PH BLDV: 7.36 PH (ref 7.32–7.43)
PH BLDV: 7.37 PH (ref 7.32–7.43)
PH BLDV: 7.38 PH (ref 7.32–7.43)
PH UR STRIP: 5 PH (ref 5–7)
PLATELET # BLD AUTO: 117 10E9/L (ref 150–450)
PLATELET # BLD AUTO: 127 10E9/L (ref 150–450)
PO2 BLDV: 31 MM HG (ref 25–47)
PO2 BLDV: 32 MM HG (ref 25–47)
PO2 BLDV: 33 MM HG (ref 25–47)
PO2 BLDV: 33 MM HG (ref 25–47)
PO2 BLDV: 36 MM HG (ref 25–47)
PO2 BLDV: 38 MM HG (ref 25–47)
POTASSIUM SERPL-SCNC: 4.6 MMOL/L (ref 3.4–5.3)
POTASSIUM SERPL-SCNC: 4.7 MMOL/L (ref 3.4–5.3)
RBC # BLD AUTO: 3.46 10E12/L (ref 4.4–5.9)
RBC # BLD AUTO: 3.6 10E12/L (ref 4.4–5.9)
RBC #/AREA URNS AUTO: 32 /HPF (ref 0–2)
SODIUM SERPL-SCNC: 136 MMOL/L (ref 133–144)
SODIUM SERPL-SCNC: 137 MMOL/L (ref 133–144)
SOURCE: ABNORMAL
SP GR UR STRIP: 1.02 (ref 1–1.03)
UROBILINOGEN UR STRIP-MCNC: NORMAL MG/DL (ref 0–2)
WBC # BLD AUTO: 11.3 10E9/L (ref 4–11)
WBC # BLD AUTO: 11.4 10E9/L (ref 4–11)
WBC #/AREA URNS AUTO: 13 /HPF (ref 0–5)

## 2020-07-17 PROCEDURE — 25000132 ZZH RX MED GY IP 250 OP 250 PS 637: Performed by: STUDENT IN AN ORGANIZED HEALTH CARE EDUCATION/TRAINING PROGRAM

## 2020-07-17 PROCEDURE — 25000128 H RX IP 250 OP 636: Performed by: SURGERY

## 2020-07-17 PROCEDURE — 81001 URINALYSIS AUTO W/SCOPE: CPT | Performed by: INTERNAL MEDICINE

## 2020-07-17 PROCEDURE — 97530 THERAPEUTIC ACTIVITIES: CPT | Mod: GP

## 2020-07-17 PROCEDURE — 99233 SBSQ HOSP IP/OBS HIGH 50: CPT | Mod: GC | Performed by: INTERNAL MEDICINE

## 2020-07-17 PROCEDURE — 87086 URINE CULTURE/COLONY COUNT: CPT | Performed by: INTERNAL MEDICINE

## 2020-07-17 PROCEDURE — 71045 X-RAY EXAM CHEST 1 VIEW: CPT

## 2020-07-17 PROCEDURE — 80048 BASIC METABOLIC PNL TOTAL CA: CPT | Performed by: SURGERY

## 2020-07-17 PROCEDURE — 40000076 ZZH STATISTIC IABP MONITORING

## 2020-07-17 PROCEDURE — 40000048 ZZH STATISTIC DAILY SWAN MONITORING

## 2020-07-17 PROCEDURE — 97161 PT EVAL LOW COMPLEX 20 MIN: CPT | Mod: GP

## 2020-07-17 PROCEDURE — 20000004 ZZH R&B ICU UMMC

## 2020-07-17 PROCEDURE — 25000132 ZZH RX MED GY IP 250 OP 250 PS 637: Performed by: SURGERY

## 2020-07-17 PROCEDURE — 85520 HEPARIN ASSAY: CPT | Performed by: SURGERY

## 2020-07-17 PROCEDURE — 85027 COMPLETE CBC AUTOMATED: CPT | Performed by: SURGERY

## 2020-07-17 PROCEDURE — 40000275 ZZH STATISTIC RCP TIME EA 10 MIN

## 2020-07-17 PROCEDURE — 82805 BLOOD GASES W/O2 SATURATION: CPT | Performed by: SURGERY

## 2020-07-17 PROCEDURE — 97110 THERAPEUTIC EXERCISES: CPT | Mod: GP

## 2020-07-17 PROCEDURE — 25000128 H RX IP 250 OP 636: Performed by: INTERNAL MEDICINE

## 2020-07-17 PROCEDURE — 87075 CULTR BACTERIA EXCEPT BLOOD: CPT | Performed by: INTERNAL MEDICINE

## 2020-07-17 PROCEDURE — 87070 CULTURE OTHR SPECIMN AEROBIC: CPT | Performed by: INTERNAL MEDICINE

## 2020-07-17 PROCEDURE — 36415 COLL VENOUS BLD VENIPUNCTURE: CPT | Performed by: INTERNAL MEDICINE

## 2020-07-17 PROCEDURE — 40000014 ZZH STATISTIC ARTERIAL MONITORING DAILY

## 2020-07-17 PROCEDURE — 82805 BLOOD GASES W/O2 SATURATION: CPT | Performed by: INTERNAL MEDICINE

## 2020-07-17 PROCEDURE — 40000196 ZZH STATISTIC RAPCV CVP MONITORING

## 2020-07-17 PROCEDURE — 00000146 ZZHCL STATISTIC GLUCOSE BY METER IP

## 2020-07-17 PROCEDURE — 87040 BLOOD CULTURE FOR BACTERIA: CPT | Performed by: INTERNAL MEDICINE

## 2020-07-17 PROCEDURE — 25000132 ZZH RX MED GY IP 250 OP 250 PS 637: Performed by: INTERNAL MEDICINE

## 2020-07-17 RX ORDER — POLYETHYLENE GLYCOL 3350 17 G/17G
17 POWDER, FOR SOLUTION ORAL DAILY
Status: DISCONTINUED | OUTPATIENT
Start: 2020-07-17 | End: 2020-08-03 | Stop reason: HOSPADM

## 2020-07-17 RX ORDER — FUROSEMIDE 10 MG/ML
40 INJECTION INTRAMUSCULAR; INTRAVENOUS DAILY
Status: DISCONTINUED | OUTPATIENT
Start: 2020-07-17 | End: 2020-07-18

## 2020-07-17 RX ADMIN — DOCUSATE SODIUM AND SENNOSIDES 2 TABLET: 8.6; 5 TABLET ORAL at 21:23

## 2020-07-17 RX ADMIN — ATORVASTATIN CALCIUM 40 MG: 40 TABLET, FILM COATED ORAL at 08:24

## 2020-07-17 RX ADMIN — ACETAMINOPHEN 650 MG: 325 TABLET, FILM COATED ORAL at 18:53

## 2020-07-17 RX ADMIN — ALTEPLASE 2 MG: 2.2 INJECTION, POWDER, LYOPHILIZED, FOR SOLUTION INTRAVENOUS at 17:15

## 2020-07-17 RX ADMIN — ASPIRIN 81 MG CHEWABLE TABLET 81 MG: 81 TABLET CHEWABLE at 08:24

## 2020-07-17 RX ADMIN — HEPARIN SODIUM 950 UNITS/HR: 10000 INJECTION, SOLUTION INTRAVENOUS at 02:16

## 2020-07-17 RX ADMIN — FUROSEMIDE 40 MG: 10 INJECTION, SOLUTION INTRAMUSCULAR; INTRAVENOUS at 18:31

## 2020-07-17 RX ADMIN — POLYETHYLENE GLYCOL 3350 17 G: 17 POWDER, FOR SOLUTION ORAL at 13:43

## 2020-07-17 RX ADMIN — AMIODARONE HYDROCHLORIDE 200 MG: 200 TABLET ORAL at 08:24

## 2020-07-17 ASSESSMENT — ACTIVITIES OF DAILY LIVING (ADL)
ADLS_ACUITY_SCORE: 14

## 2020-07-17 ASSESSMENT — MIFFLIN-ST. JEOR: SCORE: 1454

## 2020-07-17 ASSESSMENT — PAIN DESCRIPTION - DESCRIPTORS: DESCRIPTORS: ACHING

## 2020-07-17 NOTE — PROGRESS NOTES
Care Coordinator Progress Note    Admission Date/Time:  7/3/2020  Attending MD:  Arvin Sheridan MD    Data  Chart reviewed, discussed with interdisciplinary team.   Patient was admitted for:    Type 2 diabetes mellitus with both eyes affected by proliferative retinopathy and macular edema, with long-term current use of insulin (H)  Acute on chronic systolic congestive heart failure (H)  Heart failure (H)  Dental caries      Assessment  Pt transferred to 4E ICU on 7/14 after swan and IABP placement.   Pt is listed for heart transplant as status 2.  Pt femoral  IABP changed to Subclavian on 7/16.   RNCC will cont to follow plan of care.     Plan  Anticipated Discharge Date:  TBD.  Anticipated Discharge Plan:   TBD.  RNCC will cont to follow plan of care.      Reagan Diaz RN, PHN, BSN  4A and 4E/ ICU  Care Coordinator  Phone: [phone]  Pager: [phone]

## 2020-07-17 NOTE — PLAN OF CARE
ICU End of Shift Summary. See flowsheets for vital signs and detailed assessment.    Changes this shift:     Neuro: Alert and oriented, able to make needs known. Denied pain overnight.   Cardiac: SR, HR 80s-90s. Afebrile overnight. R. Groin site CDI, no obvious signs of bleeding. See flowsheet for IABP numbers. Heparin gtt unchanged;10A therapeutic.  Resp: 2L NC overnight for sleep to maintain O2 stat >92%.   GI/: Tolerating diet order, ate 100% of dinner. No BMs overnight. Urine output via villalobos 10-35 mL Q1H (see I&Os); urine clear mandy, no foul smell.    Plan: Continue with LVAD/Heart transplant workup.  Monitor hemodynamics and follow POC.

## 2020-07-17 NOTE — PROGRESS NOTES
"CLINICAL NUTRITION SERVICES - ASSESSMENT NOTE     Nutrition Prescription    Malnutrition Status:    Does not meet criteria for malnutrition     Interventions by Registered Dietitian (RD):  Provided post-transplant nutrition education     Future Recommendations:  If TF indicated this admit, recommend Nutren 1.5 @ 55 mL/hr to provide 1980 kcals (31 kcal/kg/day), 90 g PRO (1.4 g/kg/day), 1003 mL H2O, 232 g CHO and no fiber daily. Certavite daily.        REASON FOR ASSESSMENT  Lucian Henderson Sr. is a 64 year old male assessed for LOS    NUTRITION HISTORY  Pt reports good appetite. After discussion with outpatient transplant dietitian (7/2019), pt reduced portion intake in order to lose weight. Over the past year, pt has intentionally lost 32 lbs (18% body weight), maintaining his muscle mass by going to the gym or walking often.    CURRENT NUTRITION ORDERS  Diet: Mechanical/Dental Soft   Intake: Good appetite continues. Eating 100% of 3 meals/day.     LABS  Labs reviewed    MEDICATIONS  Medications reviewed    ANTHROPOMETRICS  Height: 162.6 cm (5' 4\")  Most Recent Weight: 76.3 kg (168 lb 3.4 oz)    IBW: 59.1 kg  BMI: Overweight BMI 25-29.9  Weight History: Pt has intentionally lost 32 lbs (16% body weight) over the past year for transplant candidacy. Additionally, cannot rule out weight changes r/t fluctuation in fluid status.     Wt Readings from Last 10 Encounters:   07/17/20 76.3 kg (168 lb 3.4 oz)   07/02/20 79.8 kg (176 lb)   12/10/19 93.4 kg (206 lb)   12/03/19 91.2 kg (201 lb)   11/25/19 93 kg (205 lb)   11/25/19 92.8 kg (204 lb 8 oz)   08/26/19 93.2 kg (205 lb 8 oz)   08/08/19 90.7 kg (200 lb)   07/29/19 93.3 kg (205 lb 11.2 oz)   07/17/19 90.9 kg (200 lb 4.8 oz)       Dosing Weight: 63 kg (adjusted, based on IBW of 59 kg and driest weight of 76.3 kg on 7/17)    ASSESSED NUTRITION NEEDS  Estimated Energy Needs: 1600 - 1900 kcals/day (25 - 30 kcals/kg)  Justification: Maintenance (if post-txp will " require 30 - 35 kg/kg)   Estimated Protein Needs: 70 - 90+ grams protein/day (1.1 - 1.4+ grams of pro/kg)  Justification: Increased needs (if post-txp will require 1.3 - 1.5 g/kg)  Estimated Fluid Needs: 1600 - 1900 mL/day (1 mL/kcal)   Justification: Maintenance    PHYSICAL FINDINGS  See malnutrition section below.    MALNUTRITION  % Intake: Decreased intake does not meet criteria (intentional)   % Weight Loss: Weight loss does not meet criteria (intentional)   Subcutaneous Fat Loss: None observed  Muscle Loss: None observed  Fluid Accumulation/Edema: Does not meet criteria  Malnutrition Diagnosis: Patient does not meet two of the established criteria necessary for diagnosing malnutrition    NUTRITION DIAGNOSIS  Predicted inadequate nutrient intake (kcal, protein) related to potential for reduced intake pending LOS, medical course.      INTERVENTIONS  Implementation  Nutrition Education: Reviewed post-transplant nutrition recommendations (eg NCM's  Food Safety and Tips for Adding Protein in [place]).     Goals  Patient to consume % of nutritionally adequate meal trays TID, or the equivalent with supplements/snacks.     Monitoring/Evaluation  Progress toward goals will be monitored and evaluated per protocol.

## 2020-07-17 NOTE — PLAN OF CARE
Discharge Planner PT   Patient plan for discharge: not discussed today  Current status: PT: Pt seated in chair, sats 97% on RA, Sub Clav IABP monitored by RN throughout mobility during session, VSS. Pt educated on role of PT, anticipated progressions and PT goals, very agreeable to PT,  used through most of session although pt frequently converses in English. Pt transfers sit<>stand CGA mostly for line management and safety. Pt completed standing therex over 9 minutes, followed by seated LE therex, mildly fatigued after but tolerated well. PT will coordinate with RN and RT tomorrow to trial ambulation if medically appropriate, walker in room.  Barriers to return to prior living situation: medical, cardiac, functional status  Recommendations for discharge: At this time TCU to increase functional act sammie, but may progress mobility well to be safe to discharge home, may depend on length of stay  Rationale for recommendations: see above       Entered by: Uma Vincent 07/17/2020 6:41 PM

## 2020-07-17 NOTE — PLAN OF CARE
Major Shift Events:  A&Ox4. So c/o pain R arm, but declines any intervention. NM in RUE intact, +2 radial pulses, no numbness or tingling, continue to monitor. Low diastolic on 1:1, team aware. LIO # in chart. Tpa for line given x1 and blood flow. Correlated SWAN and CVP off of VBGs and #s. SWAN-CVP of 13, VBG @ 58%; PICC-CVP of 12, VBG @ 56%       UP to chair w/+1-2+ for line management. Hep gtt @ 950 units/hr.     Plan: Continue to monitor. Contact CARDS 2 w/changes.   For vital signs and complete assessments, please see documentation flowsheets.

## 2020-07-17 NOTE — PROGRESS NOTES
[facility]   Cardiology II Service / Advanced Heart Failure  Progress note    Lucian Morillo  : 1955  MRN # [id number]    ADMIT DATE: 7/3/2020    Changes today  - discontinue dopamine 2.5 mcg/kg/min given elevated BPs  - will get blood cx and UA/UC given leukocytosis   - continue to trend hemodynamics, possible swan out pending trend in numbers   - IABP with low DBP's (discussed with Dr. Stuart, likely related to sensor position, CXR confirmed appropriate position of IABP)     ASSESSMENT & PLAN:  Lucian Henderson Sr. is a 64 year old male with a PMH of HTN, DMII, obesity, CKD, CHANTEL, ischemic cardiomyopathy and severe LV systolic dysfunction s/p 3V CABG () and primary prevention ICD ( 4/2/15). Currently he presents with 3 days of worsening fatigue and SOB with minimal exertion. Patient claims he could walk 1-2 blocks last week but has been barely able to ambulate home recently.     Admitted for possible LVAD/Transplant work up. IABP and listed status 2 for heart transplant .    Date RA MPAP PCWP SV02 Jacy CO Jacy CI MAP SVR PVR Intervention   20 8 50 38 49 2.3 1.28 89 2400 5.2    20  6 48 28 35 4.0 2.1 69 1300 2.2 Nipride, PO afterload reduction    7/10 9 56/34 32 68 5.1 2.7 68 900 1.8 Nipride 1, PO afterload reduction    14 65/42/50 36 60 4.3 2.2 89 1100 1.8 Nipride 0.25, PO afterload reduction    7/15 8 52/28/36 24 66 4.1 2.2 100 1200 2.9 IABP 1:1, PO afterload reduction     7 50/24/33 24 70 5.6 3.0 94 1200 1.6 IABP 1:1, Dopamine 2.5    9 56/24/35 22 62 4.8 2.6 75 1100 2.7 IABP 1:1       # ICM s/p 3V CABG () and primary prevention ICD ( 4/2/15)  #HFrEF Stage D, NYHA III-IV  # Ambulatory cardiogenic shock  # Arrhythmia: HF associated VT/VF  Currently he presents with 3 days of worsening fatigue and SOB with minimal exertion, cardiac cachexia and cold extremities. Admitted for possible LVAD/Transplant work up. - Echocardiogram showed an EF  of 15% on 7/5/20  - Diuresis : lasix 40 mg IV BID. Changed to PO 7/13  - ASA 81 mg daily  - Atorvastatin 40 daily  - Hydralazine 75 TID, IMDUR 60 mg (held 7/15)  - Dopamine 2.5 (started 7/15)   - Stop losartan 7/12  - He has had a colonoscopy on 8/7/19    - IABP placement tentatively on Tuesday   - NPO at [facility] for dental extraction as part of pre-heart tx work-up   - s/p tooth extractions 7/13  - 24 hours Augmentin for zander-op ppx given teeth removal and plan for IABP   - IABP and listed status 2 for heart transplant 7/14, s/p transition from femoral to subclavian IABP 7/16    #Atrial Flutter with RVR  Patient went into Atrial flutter with RVR with rates around 150 overnight on 7/7/2020 at around 2 am. 5 mg IV beta-blocker was administered but this significantly lowered the patient's blood pressure.  - 5 mg IV beta-blocker administered and heparin started on 7/7/20 over night when patient went into flutter.    - Stopped Lasix, 250 ml LR Bolus (7/6/20)  - IV Amiodarone bolus 150 , followed by 1mg/min infusion for 6h followed by 0.5mg/min, started amiodarone 400 mg PO BID 7/9 (will do BID dosing for ~ 5 days, 7/14 can go to daily dosing)   - Digoxin  mcg (7/6/20)    # DMII ( A1c: 8.3)   - Finger sticks  - Hypoglycemia protocol   - Moderate intensity sliding scale insulin       #CKD  - Monitor Scr and K Q12h   - Avoid nephrotoxic agents    Floor Care  Fluids: 1.5L fluid restriction  Food: 2 gram sodium diet, low fat diet  Electrolyte repletion: potassium/magnesium sliding scale  DVT ppx: Lovenox  Glycemic Control: sliding scale insulin   Lines: PICC   Consults: none  Code Status: full    Patient was discussed with attending physician, Dr. Terrazas.     Héctor Smith MD   Cardiology PGY-5  ..........................................................................................................................................................  Subjective:  SARITA overnight. Nurses notes reviewed.   Slept well. Fabiola  "SOB or chest pain.      PHYSICAL EXAM:  BP (!) 153/50   Pulse 80   Temp 98.7  F (37.1  C) (Oral)   Resp 20   Ht 1.626 m (5' 4\")   Wt 76.3 kg (168 lb 3.4 oz)   SpO2 97%   BMI 28.87 kg/m    GENERAL: NAD  NECK: Supple and without lymphadenopathy. JVP 10 cm  CV: S1/S2 heard without murmur   RESPIRATORY: CTAB  GI: Soft and distended. No tenderness, rebound, guarding. No palpable organomegaly.   EXTREMITIES: Peripheral edema trace.   NEUROLOGIC: Alert and orientated x 3.   MUSCULOSKELETAL: No joint swelling or tenderness.   SKIN: No jaundice. No acute rashes or lesions.     ROS:   12-pt ROS otherwise negative except as noted above    PMH:  Past Medical History:   Diagnosis Date     CAD (coronary artery disease)      CHF (congestive heart failure) (H)      CKD (chronic kidney disease), stage III (H)      Cortical cataract of both eyes      Diabetes (H)      Hyperlipidemia      Hypertension      Ischemic cardiomyopathy      Obesity      CHANTEL (obstructive sleep apnea)     occas cpap     Osteoarthritis        PSH:  Past Surgical History:   Procedure Laterality Date     C CABG, ARTERY-VEIN, THREE  02/2008     CATARACT IOL, RT/LT       COLONOSCOPY N/A 8/7/2019    Procedure: COLONOSCOPY, WITH POLYPECTOMY AND BIOPSY;  Surgeon: Chauncey Morataya MD;  Location:  GI     CV RIGHT HEART CATH N/A 3/25/2019    Procedure: CV RIGHT HEART CATH;  Surgeon: Moises Santos MD;  Location:  HEART CARDIAC CATH LAB     CV RIGHT HEART CATH N/A 7/10/2019    Procedure: CV RIGHT HEART CATH;  Surgeon: Jak Mccabe MD;  Location:  HEART CARDIAC CATH LAB     CV RIGHT HEART CATH N/A 7/8/2020    Procedure: Right Heart Cath with Leave In [place] already has ICU load;  Surgeon: Jak Mccabe MD;  Location:  HEART CARDIAC CATH LAB     EXTRACTION(S) DENTAL Left 7/13/2020    Procedure: EXTRACTION, TOOTH #11, 12, 13, 15, and 29;  Surgeon: Monica Chao DDS;  Location:  OR     IMPLANT AUTOMATIC IMPLANTABLE CARDIOVERTER " DEFIBRILLATOR       INSERT INTRAAORTIC BALLOON PUMP N/A 7/16/2020    Procedure: Subclavian Intra-Aortic Balloon Pump Placement;  Surgeon: Allan Sparrow MD;  Location: [facility] OR     PHACOEMULSIFICATION CLEAR CORNEA WITH STANDARD IOL, VITRECTOMY PARSPLANA 25 GAGUE, COMBINED Left 12/10/2019    Procedure: PHACOEMULSIFICATION, CATARACT, CLEAR CORNEAL INCISION APPROACH, W STD INTRAOCULAR LENS IMPLANT INSERT + VITRECTOMY BY PARS PLANA  USING 25-GAUGE INSTRUMENTS. ENDOLASER, INFUSION OF 20% SF6 GAS;  Surgeon: Triny Bunch MD;  Location: [facility] OR     PICC INSERTION Right 07/11/2020    basilic 44 cm total        MEDICATIONS:  Prior to Admission Medications   Prescriptions Last Dose Informant Patient Reported? Taking?   aspirin 81 MG tablet  Self No No   Sig: Take 1 tablet (81 mg) by mouth daily   atorvastatin (LIPITOR) 40 MG tablet  Self No No   Sig: Take 1 tablet (40 mg) by mouth daily   blood glucose (ACCU-CHEK SMARTVIEW) test strip  Self No No   Sig: USE TO TEST THREE TIMES DAILY AS DIRECTED   blood glucose (NO BRAND SPECIFIED) test strip  Self No No   Sig: Use to test blood sugar 2 times daily or as directed.   blood glucose monitoring (ACCU-CHEK FELIPE SMARTVIEW) meter device kit  Self No No   Sig: Use to test blood sugar 3-4 times daily, as directed.   blood glucose monitoring (ONE TOUCH DELICA) lancets  Self No No   Sig: Use to test blood sugars 2 times daily.   clopidogrel (PLAVIX) 75 MG tablet  Self No No   Sig: Take 1 tablet (75 mg) by mouth daily   exenatide ER (BYDUREON) 2 MG pen  Self No No   Sig: Inject 2 mg Subcutaneous every 7 days   furosemide (LASIX) 20 MG tablet  Self No No   Sig: Take 2 tablets (40 mg) by mouth 2 times daily   glimepiride (AMARYL) 4 MG tablet  Self No No   Sig: Take 1 tablet (4 mg) by mouth every morning (before breakfast)   hydrALAZINE (APRESOLINE) 25 MG tablet  Self No No   Sig: Take 1 tablet (25 mg) by mouth 3 times daily   insulin glargine (LANTUS SOLOSTAR) 100 UNIT/ML pen  Self  No No   Sig: Take 8 units in the morning and 40 units in the evening   insulin pen needle (B-D U/F) 31G X 5 MM miscellaneous  Self No No   Si Units by Device route 2 times daily Use 2 pen needles daily or as directed.   isosorbide mononitrate (IMDUR) 60 MG 24 hr tablet  Self No No   Sig: Take 1 tablet (60 mg) by mouth daily   losartan (COZAAR) 50 MG tablet  Spouse/Significant Other No No   Sig: Take 1 tablet (50 mg) by mouth daily   nitroglycerin (NITROSTAT) 0.4 MG SL tablet  Self No No   Sig: Place 1 tablet (0.4 mg) under the tongue every 5 minutes as needed for chest pain If you are still having symptoms after 3 doses (15 minutes) call 911.   order for DME  Self No No   Sig: Auto CPAP 8-15 cmH20      Facility-Administered Medications Last Administration Doses Remaining   erythromycin (ROMYCIN) ophthalmic ointment None recorded    lidocaine (PF) (XYLOCAINE) 2 % injection 10 mL None recorded 1           ALLERGIES:     Allergies   Allergen Reactions     No Known Drug Allergies        FAMILY HISTORY:  Family History   Problem Relation Age of Onset     Diabetes Brother      Diabetes Sister      Diabetes Sister      Macular Degeneration No family hx of      Glaucoma No family hx of      Myocardial Infarction No family hx of      Kidney Disease No family hx of        SOCIAL HISTORY:  Social History     Socioeconomic History     Marital status:      Spouse name: Not on file     Number of children: 4     Years of education: Not on file     Highest education level: Not on file   Occupational History     Occupation:      Employer: [employer]     Employer: RETIRED   Social Needs     Financial resource strain: Not on file     Food insecurity     Worry: Not on file     Inability: Not on file     Transportation needs     Medical: Not on file     Non-medical: Not on file   Tobacco Use     Smoking status: Never Smoker     Smokeless tobacco: Never Used     Tobacco comment: Never smoked; non-smoking  household   Substance and Sexual Activity     Alcohol use: No     Alcohol/week: 0.0 standard drinks     Drug use: No     Sexual activity: Yes     Partners: Female   Lifestyle     Physical activity     Days per week: Not on file     Minutes per session: Not on file     Stress: Not on file   Relationships     Social connections     Talks on phone: Not on file     Gets together: Not on file     Attends Baptism service: Not on file     Active member of club or organization: Not on file     Attends meetings of clubs or organizations: Not on file     Relationship status: Not on file     Intimate partner violence     Fear of current or ex partner: Not on file     Emotionally abused: Not on file     Physically abused: Not on file     Forced sexual activity: Not on file   Other Topics Concern     Parent/sibling w/ CABG, MI or angioplasty before 65F 55M? No   Social History Narrative     Not on file       LABS:  BMP  Recent Labs   Lab 07/17/20  1522 07/17/20  0350 07/16/20  1453 07/16/20  0350    137 137 138   POTASSIUM 4.6 4.7 4.7 4.3   CHLORIDE 109 109 109 108   CO2 22 21 23 25   BUN 39* 40* 33* 32*   CR 1.61* 1.74* 1.68* 1.73*   * 192* 192* 146*   BRYANNA 8.0* 8.4* 8.9 8.8     CBC  Recent Labs   Lab 07/17/20  0350 07/17/20  0001 07/16/20  0350 07/15/20  0352   WBC 11.4* 11.3* 6.9 6.6   HGB 10.9* 10.5* 11.4* 11.0*   HCT 34.7* 33.6* 36.4* 35.0*   MCV 96 97 97 98   * 127* 188 218     INR  Recent Labs   Lab 07/13/20  0351   INR 1.05     IMAGING:    RHC 6/18/20     RA 20/26/18  RV 85/28  PA 82/45/58  PCWP 45  Cardiac output by Jacy: 3.85 L/min (indexed to 2.09 L/min/m2)  Cardiac output by thermodilution: 3.63 L/min (indexed to 1.97 L/min/m2)  SVR (by TD CO): 1123  PVR (by TD CO): 7.4    Angiogram 6/18/20   3 vessel CAD s/p 3V CABG in 2008 (LIMA-LAD, SVG-OM1, SVG-RPDA)  2. Known proximal SVG-RPDA occlusion  3. Presumed occlusion of SVG-OM1 (unable to locate on non-selective aortic root shot)  4. Patent LIMA-LAD  with collateralization of LAD to PDA    Echocardiogram ( 3/11/2019)   Moderate to severe left ventricular dilation is present.  Severely (EF 10-20%) reduced left ventricular function is present.  No significant valve dysfunction.  Compared to study done 6/5/17 there is no significant change in LV size and  systolic function    Echocardiogram ( 7/5/2020)   Interpretation Summary  Severely (EF 10-20%) reduced left ventricular function is present. LVEF 15%  based on biplane 2D tracing. Severe left ventricular dilation is present.  LVIDd:6.8 cm. Grade III or advanced diastolic dysfunction.  The right ventricle is normal size.  Global right ventricular function is moderately reduced.  No significant valvular abnormalities were noted.  IVC diameter and respiratory changes fall into an intermediate range  suggesting an RA pressure of 8 mmHg.  Mild pulmonary hypertension is present.     This study was compared with the study from 3/25/2019. RV function has  worsened, LV size and function appear similar.    Devices  ICD ([place]- 4/2/2015 )     I have reviewed today's vital signs, notes, medications, labs and imaging.  I have also seen and examined the patient and agree with the findings and plan as outlined above.  Pt with family at bedside.  Subclavian IABP in place. Afebrile with HR 80s and MAP 70-80 with IABP 1:1, CVP 8, PCW 22 and CI 2.6.  Exam as above.  Assessment: Pt with endstage heart failure currently Status II awaiting OHT. Blood Group O.  Renal fn improving.  Pt seen X2 for total critical care time 35 min.     James Terrazas MD, PhD  Professor, Heart Failure and Cardiac Transplantation  [facility]

## 2020-07-18 ENCOUNTER — APPOINTMENT (OUTPATIENT)
Dept: GENERAL RADIOLOGY | Facility: CLINIC | Age: 65
DRG: 001 | End: 2020-07-18
Attending: SURGERY
Payer: COMMERCIAL

## 2020-07-18 ENCOUNTER — APPOINTMENT (OUTPATIENT)
Dept: PHYSICAL THERAPY | Facility: CLINIC | Age: 65
DRG: 001 | End: 2020-07-18
Attending: INTERNAL MEDICINE
Payer: COMMERCIAL

## 2020-07-18 LAB
ANION GAP SERPL CALCULATED.3IONS-SCNC: 4 MMOL/L (ref 3–14)
ANION GAP SERPL CALCULATED.3IONS-SCNC: 6 MMOL/L (ref 3–14)
BACTERIA SPEC CULT: NO GROWTH
BASE EXCESS BLDV CALC-SCNC: 1.8 MMOL/L
BASE EXCESS BLDV CALC-SCNC: 2.4 MMOL/L
BUN SERPL-MCNC: 37 MG/DL (ref 7–30)
BUN SERPL-MCNC: 39 MG/DL (ref 7–30)
CALCIUM SERPL-MCNC: 8.5 MG/DL (ref 8.5–10.1)
CALCIUM SERPL-MCNC: 8.5 MG/DL (ref 8.5–10.1)
CHLORIDE SERPL-SCNC: 101 MMOL/L (ref 94–109)
CHLORIDE SERPL-SCNC: 107 MMOL/L (ref 94–109)
CO2 SERPL-SCNC: 25 MMOL/L (ref 20–32)
CO2 SERPL-SCNC: 27 MMOL/L (ref 20–32)
CREAT SERPL-MCNC: 1.68 MG/DL (ref 0.66–1.25)
CREAT SERPL-MCNC: 1.7 MG/DL (ref 0.66–1.25)
ERYTHROCYTE [DISTWIDTH] IN BLOOD BY AUTOMATED COUNT: 15.3 % (ref 10–15)
GFR SERPL CREATININE-BSD FRML MDRD: 41 ML/MIN/{1.73_M2}
GFR SERPL CREATININE-BSD FRML MDRD: 41 ML/MIN/{1.73_M2}
GLUCOSE BLDC GLUCOMTR-MCNC: 125 MG/DL (ref 70–99)
GLUCOSE BLDC GLUCOMTR-MCNC: 135 MG/DL (ref 70–99)
GLUCOSE BLDC GLUCOMTR-MCNC: 238 MG/DL (ref 70–99)
GLUCOSE BLDC GLUCOMTR-MCNC: 260 MG/DL (ref 70–99)
GLUCOSE BLDC GLUCOMTR-MCNC: 329 MG/DL (ref 70–99)
GLUCOSE SERPL-MCNC: 141 MG/DL (ref 70–99)
GLUCOSE SERPL-MCNC: 282 MG/DL (ref 70–99)
HCO3 BLDV-SCNC: 28 MMOL/L (ref 21–28)
HCO3 BLDV-SCNC: 28 MMOL/L (ref 21–28)
HCT VFR BLD AUTO: 31.8 % (ref 40–53)
HGB BLD-MCNC: 10.1 G/DL (ref 13.3–17.7)
LMWH PPP CHRO-ACNC: 0.2 IU/ML
MAGNESIUM SERPL-MCNC: 2.1 MG/DL (ref 1.6–2.3)
MCH RBC QN AUTO: 30.8 PG (ref 26.5–33)
MCHC RBC AUTO-ENTMCNC: 31.8 G/DL (ref 31.5–36.5)
MCV RBC AUTO: 97 FL (ref 78–100)
O2/TOTAL GAS SETTING VFR VENT: 21 %
O2/TOTAL GAS SETTING VFR VENT: NORMAL %
OXYHGB MFR BLDV: 53 %
OXYHGB MFR BLDV: 59 %
PCO2 BLDV: 46 MM HG (ref 40–50)
PCO2 BLDV: 47 MM HG (ref 40–50)
PH BLDV: 7.38 PH (ref 7.32–7.43)
PH BLDV: 7.39 PH (ref 7.32–7.43)
PHOSPHATE SERPL-MCNC: 2.5 MG/DL (ref 2.5–4.5)
PLATELET # BLD AUTO: 95 10E9/L (ref 150–450)
PO2 BLDV: 31 MM HG (ref 25–47)
PO2 BLDV: 33 MM HG (ref 25–47)
POTASSIUM SERPL-SCNC: 4.1 MMOL/L (ref 3.4–5.3)
POTASSIUM SERPL-SCNC: 4.2 MMOL/L (ref 3.4–5.3)
RBC # BLD AUTO: 3.28 10E12/L (ref 4.4–5.9)
SODIUM SERPL-SCNC: 134 MMOL/L (ref 133–144)
SODIUM SERPL-SCNC: 136 MMOL/L (ref 133–144)
SPECIMEN SOURCE: NORMAL
WBC # BLD AUTO: 9.7 10E9/L (ref 4–11)

## 2020-07-18 PROCEDURE — 97116 GAIT TRAINING THERAPY: CPT | Mod: GP

## 2020-07-18 PROCEDURE — 86022 PLATELET ANTIBODIES: CPT | Performed by: STUDENT IN AN ORGANIZED HEALTH CARE EDUCATION/TRAINING PROGRAM

## 2020-07-18 PROCEDURE — 82805 BLOOD GASES W/O2 SATURATION: CPT | Performed by: SURGERY

## 2020-07-18 PROCEDURE — 25000132 ZZH RX MED GY IP 250 OP 250 PS 637: Performed by: SURGERY

## 2020-07-18 PROCEDURE — 86850 RBC ANTIBODY SCREEN: CPT | Performed by: STUDENT IN AN ORGANIZED HEALTH CARE EDUCATION/TRAINING PROGRAM

## 2020-07-18 PROCEDURE — 20000004 ZZH R&B ICU UMMC

## 2020-07-18 PROCEDURE — 97110 THERAPEUTIC EXERCISES: CPT | Mod: GP

## 2020-07-18 PROCEDURE — 85520 HEPARIN ASSAY: CPT | Performed by: SURGERY

## 2020-07-18 PROCEDURE — 86923 COMPATIBILITY TEST ELECTRIC: CPT | Performed by: STUDENT IN AN ORGANIZED HEALTH CARE EDUCATION/TRAINING PROGRAM

## 2020-07-18 PROCEDURE — 25000132 ZZH RX MED GY IP 250 OP 250 PS 637: Performed by: INTERNAL MEDICINE

## 2020-07-18 PROCEDURE — 25000128 H RX IP 250 OP 636: Performed by: STUDENT IN AN ORGANIZED HEALTH CARE EDUCATION/TRAINING PROGRAM

## 2020-07-18 PROCEDURE — 80048 BASIC METABOLIC PNL TOTAL CA: CPT | Performed by: SURGERY

## 2020-07-18 PROCEDURE — 86900 BLOOD TYPING SEROLOGIC ABO: CPT | Performed by: STUDENT IN AN ORGANIZED HEALTH CARE EDUCATION/TRAINING PROGRAM

## 2020-07-18 PROCEDURE — 86901 BLOOD TYPING SEROLOGIC RH(D): CPT | Performed by: STUDENT IN AN ORGANIZED HEALTH CARE EDUCATION/TRAINING PROGRAM

## 2020-07-18 PROCEDURE — 40000014 ZZH STATISTIC ARTERIAL MONITORING DAILY

## 2020-07-18 PROCEDURE — 84100 ASSAY OF PHOSPHORUS: CPT | Performed by: SURGERY

## 2020-07-18 PROCEDURE — 71045 X-RAY EXAM CHEST 1 VIEW: CPT

## 2020-07-18 PROCEDURE — 40000275 ZZH STATISTIC RCP TIME EA 10 MIN

## 2020-07-18 PROCEDURE — 83735 ASSAY OF MAGNESIUM: CPT | Performed by: SURGERY

## 2020-07-18 PROCEDURE — 00000146 ZZHCL STATISTIC GLUCOSE BY METER IP

## 2020-07-18 PROCEDURE — 40000076 ZZH STATISTIC IABP MONITORING

## 2020-07-18 PROCEDURE — 25000132 ZZH RX MED GY IP 250 OP 250 PS 637: Performed by: STUDENT IN AN ORGANIZED HEALTH CARE EDUCATION/TRAINING PROGRAM

## 2020-07-18 PROCEDURE — 99233 SBSQ HOSP IP/OBS HIGH 50: CPT | Mod: GC | Performed by: INTERNAL MEDICINE

## 2020-07-18 PROCEDURE — 25000128 H RX IP 250 OP 636: Performed by: SURGERY

## 2020-07-18 PROCEDURE — 25000128 H RX IP 250 OP 636: Performed by: INTERNAL MEDICINE

## 2020-07-18 PROCEDURE — 85027 COMPLETE CBC AUTOMATED: CPT | Performed by: SURGERY

## 2020-07-18 RX ORDER — FUROSEMIDE 10 MG/ML
40 INJECTION INTRAMUSCULAR; INTRAVENOUS
Status: DISCONTINUED | OUTPATIENT
Start: 2020-07-18 | End: 2020-07-19

## 2020-07-18 RX ORDER — CEFTRIAXONE 1 G/1
1 INJECTION, POWDER, FOR SOLUTION INTRAMUSCULAR; INTRAVENOUS EVERY 24 HOURS
Status: DISCONTINUED | OUTPATIENT
Start: 2020-07-18 | End: 2020-07-19

## 2020-07-18 RX ADMIN — ASPIRIN 81 MG CHEWABLE TABLET 81 MG: 81 TABLET CHEWABLE at 08:00

## 2020-07-18 RX ADMIN — FUROSEMIDE 40 MG: 10 INJECTION, SOLUTION INTRAMUSCULAR; INTRAVENOUS at 08:01

## 2020-07-18 RX ADMIN — POLYETHYLENE GLYCOL 3350 17 G: 17 POWDER, FOR SOLUTION ORAL at 08:00

## 2020-07-18 RX ADMIN — ATORVASTATIN CALCIUM 40 MG: 40 TABLET, FILM COATED ORAL at 08:01

## 2020-07-18 RX ADMIN — FUROSEMIDE 40 MG: 10 INJECTION, SOLUTION INTRAVENOUS at 15:47

## 2020-07-18 RX ADMIN — ACETAMINOPHEN 650 MG: 325 TABLET, FILM COATED ORAL at 05:11

## 2020-07-18 RX ADMIN — HEPARIN SODIUM 950 UNITS/HR: 10000 INJECTION, SOLUTION INTRAVENOUS at 07:44

## 2020-07-18 RX ADMIN — AMIODARONE HYDROCHLORIDE 200 MG: 200 TABLET ORAL at 08:00

## 2020-07-18 RX ADMIN — CEFTRIAXONE 1 G: 1 INJECTION, POWDER, FOR SOLUTION INTRAMUSCULAR; INTRAVENOUS at 12:14

## 2020-07-18 RX ADMIN — DOCUSATE SODIUM AND SENNOSIDES 2 TABLET: 8.6; 5 TABLET ORAL at 15:46

## 2020-07-18 ASSESSMENT — ACTIVITIES OF DAILY LIVING (ADL)
ADLS_ACUITY_SCORE: 13
ADLS_ACUITY_SCORE: 14
ADLS_ACUITY_SCORE: 14
ADLS_ACUITY_SCORE: 13
ADLS_ACUITY_SCORE: 14
ADLS_ACUITY_SCORE: 13

## 2020-07-18 ASSESSMENT — MIFFLIN-ST. JEOR: SCORE: 1461

## 2020-07-18 NOTE — PROGRESS NOTES
[facility]   Cardiology II Service / Advanced Heart Failure  Progress note    Lucian Morillo  : 1955  MRN # [id number]    ADMIT DATE: 7/3/2020    Changes today  - discontinue dopamine 2.5 mcg/kg/min given elevated BPs  - HIT to be sent at 8pm, heparin stopped at noon  - continue to trend hemodynamics q24h  - Right internal jugular sheath pulled  - Increase lasix to 40 mg IV BID  - ceftriaxone for 5 days due to pyuria, awaiting     ASSESSMENT & PLAN:  Lucian Henderson Sr. is a 64 year old male with a PMH of HTN, DMII, obesity, CKD, CHANTEL, ischemic cardiomyopathy and severe LV systolic dysfunction s/p 3V CABG () and primary prevention ICD ( 4/2/15). Currently he presents with 3 days of worsening fatigue and SOB with minimal exertion. Patient claims he could walk 1-2 blocks last week but has been barely able to ambulate home recently.     Admitted for possible LVAD/Transplant work up. IABP and listed status 2 for heart transplant .    Date RA MPAP PCWP SV02 Jacy CO Jacy CI MAP SVR PVR Intervention   20 8 50 38 49 2.3 1.28 89 2400 5.2    20  6 48 28 35 4.0 2.1 69 1300 2.2 Nipride, PO afterload reduction    7/10 9 56/34 32 68 5.1 2.7 68 900 1.8 Nipride 1, PO afterload reduction    14 65/42/50 36 60 4.3 2.2 89 1100 1.8 Nipride 0.25, PO afterload reduction    7/15 8 52/28/36 24 66 4.1 2.2 100 1200 2.9 IABP 1:1, PO afterload reduction     7 50/24/33 24 70 5.6 3.0 94 1200 1.6 IABP 1:1, Dopamine 2.5    9 56/24/35 22 62 4.8 2.6 75 1100 2.7 IABP 1:1       # ICM s/p 3V CABG () and primary prevention ICD ( 4/2/15)  #HFrEF Stage D, NYHA III-IV  # Ambulatory cardiogenic shock  # Arrhythmia: HF associated VT/VF  Currently he presents with 3 days of worsening fatigue and SOB with minimal exertion, cardiac cachexia and cold extremities. Admitted for possible LVAD/Transplant work up. - Echocardiogram showed an EF of 15% on 20  - Diuresis : lasix 40 mg IV  "BID (7/18->). Changed to PO 7/13  - ASA 81 mg daily  - Atorvastatin 40 daily  - Hydralazine 75 TID, IMDUR 60 mg (held 7/15)  - Dopamine 2.5 (started 7/15)   - Stop losartan 7/12  - He has had a colonoscopy on 8/7/19    - NPO at [facility] for dental extraction as part of pre-heart tx work-up   - s/p tooth extractions 7/13  - 24 hours Augmentin for zander-op ppx given teeth removal and plan for IABP   - IABP and listed status 2 for heart transplant 7/14, s/p transition from femoral to subclavian IABP 7/16  - continue to trend hemodynamics q24h    #Atrial Flutter with RVR  Patient went into Atrial flutter with RVR with rates around 150 overnight on 7/7/2020 at around 2 am. 5 mg IV beta-blocker was administered but this significantly lowered the patient's blood pressure.  - 5 mg IV beta-blocker administered and heparin started on 7/7/20 over night when patient went into flutter.    - Stopped Lasix, 250 ml LR Bolus (7/6/20)  - IV Amiodarone bolus 150 , followed by 1mg/min infusion for 6h followed by 0.5mg/min, started amiodarone 400 mg PO BID 7/9 (will do BID dosing for ~ 5 days, 7/14 can go to daily dosing)   - Digoxin  mcg (7/6/20)    # Thrombocytopenia  - HIT to be sent at 8pm, heparin stopped at noon 7/18    # DMII ( A1c: 8.3)   - Finger sticks  - Hypoglycemia protocol   - Moderate intensity sliding scale insulin       #CKD  - Monitor Scr and K Q12h   - Avoid nephrotoxic agents    Floor Care  Fluids: 1.5L fluid restriction  Food: 2 gram sodium diet, low fat diet  Electrolyte repletion: potassium/magnesium sliding scale  DVT ppx: Lovenox  Glycemic Control: sliding scale insulin   Lines: PICC   Consults: none  Code Status: full    Patient was discussed with attending physician, Dr. Terrazas.     Elías \"Mint\" Marvin  PGY-3   Pager [phone]  ..........................................................................................................................................................  Subjective:  SARITA " "overnight. Nurses notes reviewed.   Slept well. Denies SOB or chest pain.      PHYSICAL EXAM:  BP (!) 153/50   Pulse 80   Temp 98.5  F (36.9  C) (Oral)   Resp 18   Ht 1.626 m (5' 4\")   Wt 77 kg (169 lb 12.1 oz)   SpO2 99%   BMI 29.14 kg/m    GENERAL: NAD  NECK: Supple and without lymphadenopathy. JVP 11 cm  CV: S1/S2 heard without murmur   RESPIRATORY: CTAB  GI: Soft and distended. No tenderness, rebound, guarding. No palpable organomegaly.   EXTREMITIES: Peripheral edema trace.   NEUROLOGIC: Alert and orientated x 3.   MUSCULOSKELETAL: No joint swelling or tenderness.   SKIN: No jaundice. No acute rashes or lesions.     ROS:   12-pt ROS otherwise negative except as noted above    PMH:  Past Medical History:   Diagnosis Date     CAD (coronary artery disease)      CHF (congestive heart failure) (H)      CKD (chronic kidney disease), stage III (H)      Cortical cataract of both eyes      Diabetes (H)      Hyperlipidemia      Hypertension      Ischemic cardiomyopathy      Obesity      CHANTEL (obstructive sleep apnea)     occas cpap     Osteoarthritis        PSH:  Past Surgical History:   Procedure Laterality Date     C CABG, ARTERY-VEIN, THREE  02/2008     CATARACT IOL, RT/LT       COLONOSCOPY N/A 8/7/2019    Procedure: COLONOSCOPY, WITH POLYPECTOMY AND BIOPSY;  Surgeon: Chauncey Morataya MD;  Location:  GI     CV RIGHT HEART CATH N/A 3/25/2019    Procedure: CV RIGHT HEART CATH;  Surgeon: Moises Santos MD;  Location:  HEART CARDIAC CATH LAB     CV RIGHT HEART CATH N/A 7/10/2019    Procedure: CV RIGHT HEART CATH;  Surgeon: Jak Mccabe MD;  Location:  HEART CARDIAC CATH LAB     CV RIGHT HEART CATH N/A 7/8/2020    Procedure: Right Heart Cath with Leave In [place] already has ICU load;  Surgeon: Jak Mccabe MD;  Location:  HEART CARDIAC CATH LAB     EXTRACTION(S) DENTAL Left 7/13/2020    Procedure: EXTRACTION, TOOTH #11, 12, 13, 15, and 29;  Surgeon: Monica Choa DDS;  Location:  OR     " IMPLANT AUTOMATIC IMPLANTABLE CARDIOVERTER DEFIBRILLATOR       INSERT INTRAAORTIC BALLOON PUMP N/A 7/16/2020    Procedure: Subclavian Intra-Aortic Balloon Pump Placement;  Surgeon: Allan Sparrow MD;  Location: [facility] OR     PHACOEMULSIFICATION CLEAR CORNEA WITH STANDARD IOL, VITRECTOMY PARSPLANA 25 GAGUE, COMBINED Left 12/10/2019    Procedure: PHACOEMULSIFICATION, CATARACT, CLEAR CORNEAL INCISION APPROACH, W STD INTRAOCULAR LENS IMPLANT INSERT + VITRECTOMY BY PARS PLANA  USING 25-GAUGE INSTRUMENTS. ENDOLASER, INFUSION OF 20% SF6 GAS;  Surgeon: Triny Bunch MD;  Location: [facility] OR     PICC INSERTION Right 07/11/2020    basilic 44 cm total        MEDICATIONS:  Prior to Admission Medications   Prescriptions Last Dose Informant Patient Reported? Taking?   aspirin 81 MG tablet  Self No No   Sig: Take 1 tablet (81 mg) by mouth daily   atorvastatin (LIPITOR) 40 MG tablet  Self No No   Sig: Take 1 tablet (40 mg) by mouth daily   blood glucose (ACCU-CHEK SMARTVIEW) test strip  Self No No   Sig: USE TO TEST THREE TIMES DAILY AS DIRECTED   blood glucose (NO BRAND SPECIFIED) test strip  Self No No   Sig: Use to test blood sugar 2 times daily or as directed.   blood glucose monitoring (ACCU-CHEK FELIPE SMARTVIEW) meter device kit  Self No No   Sig: Use to test blood sugar 3-4 times daily, as directed.   blood glucose monitoring (ONE TOUCH DELICA) lancets  Self No No   Sig: Use to test blood sugars 2 times daily.   clopidogrel (PLAVIX) 75 MG tablet  Self No No   Sig: Take 1 tablet (75 mg) by mouth daily   exenatide ER (BYDUREON) 2 MG pen  Self No No   Sig: Inject 2 mg Subcutaneous every 7 days   furosemide (LASIX) 20 MG tablet  Self No No   Sig: Take 2 tablets (40 mg) by mouth 2 times daily   glimepiride (AMARYL) 4 MG tablet  Self No No   Sig: Take 1 tablet (4 mg) by mouth every morning (before breakfast)   hydrALAZINE (APRESOLINE) 25 MG tablet  Self No No   Sig: Take 1 tablet (25 mg) by mouth 3 times daily   insulin  glargine (LANTUS SOLOSTAR) 100 UNIT/ML pen  Self No No   Sig: Take 8 units in the morning and 40 units in the evening   insulin pen needle (B-D U/F) 31G X 5 MM miscellaneous  Self No No   Si Units by Device route 2 times daily Use 2 pen needles daily or as directed.   isosorbide mononitrate (IMDUR) 60 MG 24 hr tablet  Self No No   Sig: Take 1 tablet (60 mg) by mouth daily   losartan (COZAAR) 50 MG tablet  Spouse/Significant Other No No   Sig: Take 1 tablet (50 mg) by mouth daily   nitroglycerin (NITROSTAT) 0.4 MG SL tablet  Self No No   Sig: Place 1 tablet (0.4 mg) under the tongue every 5 minutes as needed for chest pain If you are still having symptoms after 3 doses (15 minutes) call 911.   order for DME  Self No No   Sig: Auto CPAP 8-15 cmH20      Facility-Administered Medications Last Administration Doses Remaining   erythromycin (ROMYCIN) ophthalmic ointment None recorded    lidocaine (PF) (XYLOCAINE) 2 % injection 10 mL None recorded 1           ALLERGIES:     Allergies   Allergen Reactions     No Known Drug Allergies        FAMILY HISTORY:  Family History   Problem Relation Age of Onset     Diabetes Brother      Diabetes Sister      Diabetes Sister      Macular Degeneration No family hx of      Glaucoma No family hx of      Myocardial Infarction No family hx of      Kidney Disease No family hx of        SOCIAL HISTORY:  Social History     Socioeconomic History     Marital status:      Spouse name: Not on file     Number of children: 4     Years of education: Not on file     Highest education level: Not on file   Occupational History     Occupation:      Employer: [employer]     Employer: RETIRED   Social Needs     Financial resource strain: Not on file     Food insecurity     Worry: Not on file     Inability: Not on file     Transportation needs     Medical: Not on file     Non-medical: Not on file   Tobacco Use     Smoking status: Never Smoker     Smokeless tobacco: Never Used      Tobacco comment: Never smoked; non-smoking household   Substance and Sexual Activity     Alcohol use: No     Alcohol/week: 0.0 standard drinks     Drug use: No     Sexual activity: Yes     Partners: Female   Lifestyle     Physical activity     Days per week: Not on file     Minutes per session: Not on file     Stress: Not on file   Relationships     Social connections     Talks on phone: Not on file     Gets together: Not on file     Attends [employer] service: Not on file     Active member of club or organization: Not on file     Attends meetings of clubs or organizations: Not on file     Relationship status: Not on file     Intimate partner violence     Fear of current or ex partner: Not on file     Emotionally abused: Not on file     Physically abused: Not on file     Forced sexual activity: Not on file   Other Topics Concern     Parent/sibling w/ CABG, MI or angioplasty before 65F 55M? No   Social History Narrative     Not on file       LABS:  BMP  Recent Labs   Lab 07/18/20  0342 07/17/20  1522 07/17/20  0350 07/16/20  1453    136 137 137   POTASSIUM 4.1 4.6 4.7 4.7   CHLORIDE 107 109 109 109   CO2 25 22 21 23   BUN 37* 39* 40* 33*   CR 1.68* 1.61* 1.74* 1.68*   * 292* 192* 192*   BRYANNA 8.5 8.0* 8.4* 8.9   PHOS 2.5  --   --   --      CBC  Recent Labs   Lab 07/18/20  0342 07/17/20  0350 07/17/20  0001 07/16/20  0350   WBC 9.7 11.4* 11.3* 6.9   HGB 10.1* 10.9* 10.5* 11.4*   HCT 31.8* 34.7* 33.6* 36.4*   MCV 97 96 97 97   PLT 95* 117* 127* 188     INR  Recent Labs   Lab 07/13/20  0351   INR 1.05     IMAGING:    RHC 6/18/20     RA 20/26/18  RV 85/28  PA 82/45/58  PCWP 45  Cardiac output by Jacy: 3.85 L/min (indexed to 2.09 L/min/m2)  Cardiac output by thermodilution: 3.63 L/min (indexed to 1.97 L/min/m2)  SVR (by TD CO): 1123  PVR (by TD CO): 7.4    Angiogram 6/18/20   3 vessel CAD s/p 3V CABG in 2008 (LIMA-LAD, SVG-OM1, SVG-RPDA)  2. Known proximal SVG-RPDA occlusion  3. Presumed occlusion of SVG-OM1  (unable to locate on non-selective aortic root shot)  4. Patent LIMA-LAD with collateralization of LAD to PDA    Echocardiogram ( 3/11/2019)   Moderate to severe left ventricular dilation is present.  Severely (EF 10-20%) reduced left ventricular function is present.  No significant valve dysfunction.  Compared to study done 6/5/17 there is no significant change in LV size and  systolic function    Echocardiogram ( 7/5/2020)   Interpretation Summary  Severely (EF 10-20%) reduced left ventricular function is present. LVEF 15%  based on biplane 2D tracing. Severe left ventricular dilation is present.  LVIDd:6.8 cm. Grade III or advanced diastolic dysfunction.  The right ventricle is normal size.  Global right ventricular function is moderately reduced.  No significant valvular abnormalities were noted.  IVC diameter and respiratory changes fall into an intermediate range  suggesting an RA pressure of 8 mmHg.  Mild pulmonary hypertension is present.     This study was compared with the study from 3/25/2019. RV function has  worsened, LV size and function appear similar.    Devices  ICD ([employer]- 4/2/2015 )     I have reviewed today's vital signs, notes, medications, labs and imaging.  I have also seen and examined the patient and agree with the findings and plan as outlined above.  Pt with family at bedside.  99.3, HR 80 and /61 with subclavian IABP on heparin gtt with 2lpm O2. Lungs clear and S1 and S2 with mechanical IABP. Labs with Cr 1.68 and WBC 9.7 with UA revealing UTI.  Assessment: Pt with cardiogenic shock supported by IABP awaiting OHT UNOS Status II.  UTI will begin ceftriaxone.  Pt seen X2 for total critical care time 35 min.     James Terrazas MD, PhD  Professor, Heart Failure and Cardiac Transplantation  [facility]

## 2020-07-18 NOTE — PLAN OF CARE
Discharge Planner PT   Patient plan for discharge: home if able  Current status: PT: Pt very agreeable to working with PT, on RA, with SCIABP and able to coordinate therapy session with RT/RN/PT/ iPad. Pt transfers CGA sit<>stand with edu for safety, lines. Pt ambulates with wh walker 600' CGA for safety, lines and some edu on breathing technique for minimal SOB. Pt reports not fatiguing as quickly as was prior to admission. Pt also tolerated seated and standing therex in room. PT appointment set for Sunday @ 1100am, to ambulate with RT/RN.    Barriers to return to prior living situation: medical, functional status  Recommendations for discharge: At this time TCU to increase functional act sammie, but may progress mobility well to be safe to discharge home, may depend on length of stay  Rationale for recommendations: see above       Entered by: Uma Vincent 07/18/2020 5:55 PM

## 2020-07-18 NOTE — PLAN OF CARE
Major Shift Events: R subclavian IABP remained on 1:1 settings, no issues overnight, diastolic BP in low 20s, team aware. [place] removed and tip culture sent to lab, per order. Pain in R arm improved, no numbness or tingling. 2 L nasal cannula placed on pt for SPO2 in mid 80s during sleep. From PICC line, CVP 12, VBG oxyhemoglobin 59%. Senna given for constipation. Hepatin gtt therapeutic this AM, rate remains at 950 unit(s)/hr.

## 2020-07-19 ENCOUNTER — APPOINTMENT (OUTPATIENT)
Dept: GENERAL RADIOLOGY | Facility: CLINIC | Age: 65
DRG: 001 | End: 2020-07-19
Attending: STUDENT IN AN ORGANIZED HEALTH CARE EDUCATION/TRAINING PROGRAM
Payer: COMMERCIAL

## 2020-07-19 ENCOUNTER — APPOINTMENT (OUTPATIENT)
Dept: GENERAL RADIOLOGY | Facility: CLINIC | Age: 65
DRG: 001 | End: 2020-07-19
Attending: INTERNAL MEDICINE
Payer: COMMERCIAL

## 2020-07-19 ENCOUNTER — APPOINTMENT (OUTPATIENT)
Dept: GENERAL RADIOLOGY | Facility: CLINIC | Age: 65
DRG: 001 | End: 2020-07-19
Attending: SURGERY
Payer: COMMERCIAL

## 2020-07-19 ENCOUNTER — RESULTS ONLY (OUTPATIENT)
Dept: OTHER | Facility: CLINIC | Age: 65
End: 2020-07-19

## 2020-07-19 ENCOUNTER — DOCUMENTATION ONLY (OUTPATIENT)
Dept: TRANSPLANT | Facility: CLINIC | Age: 65
End: 2020-07-19

## 2020-07-19 ENCOUNTER — SURGERY (OUTPATIENT)
Age: 65
End: 2020-07-19
Payer: COMMERCIAL

## 2020-07-19 ENCOUNTER — ANESTHESIA EVENT (OUTPATIENT)
Dept: SURGERY | Facility: CLINIC | Age: 65
End: 2020-07-19

## 2020-07-19 ENCOUNTER — APPOINTMENT (OUTPATIENT)
Dept: LAB | Facility: CLINIC | Age: 65
End: 2020-07-19
Payer: COMMERCIAL

## 2020-07-19 ENCOUNTER — ORGAN (OUTPATIENT)
Dept: TRANSPLANT | Facility: CLINIC | Age: 65
End: 2020-07-19

## 2020-07-19 ENCOUNTER — ANESTHESIA (OUTPATIENT)
Dept: SURGERY | Facility: CLINIC | Age: 65
End: 2020-07-19
Payer: COMMERCIAL

## 2020-07-19 ENCOUNTER — APPOINTMENT (OUTPATIENT)
Dept: INTERPRETER SERVICES | Facility: CLINIC | Age: 65
End: 2020-07-19
Payer: COMMERCIAL

## 2020-07-19 DIAGNOSIS — I42.0 CARDIOMYOPATHY, DILATED (H): Primary | ICD-10-CM

## 2020-07-19 LAB
ALBUMIN SERPL-MCNC: 2.6 G/DL (ref 3.4–5)
ALBUMIN UR-MCNC: 30 MG/DL
ALP SERPL-CCNC: 113 U/L (ref 40–150)
ALT SERPL W P-5'-P-CCNC: 21 U/L (ref 0–70)
AMYLASE SERPL-CCNC: 36 U/L (ref 30–110)
ANGLE RATE OF CLOT GROWTH: 70.8 DEG (ref 59–74)
ANGLE RATE OF CLOT GROWTH: 72.4 DEG (ref 59–74)
ANION GAP SERPL CALCULATED.3IONS-SCNC: 4 MMOL/L (ref 3–14)
ANION GAP SERPL CALCULATED.3IONS-SCNC: 4 MMOL/L (ref 3–14)
APPEARANCE UR: CLEAR
APTT PPP: 31 SEC (ref 22–37)
APTT PPP: 35 SEC (ref 22–37)
APTT PPP: 37 SEC (ref 22–37)
AST SERPL W P-5'-P-CCNC: 24 U/L (ref 0–45)
BACTERIA SPEC CULT: NO GROWTH
BASE EXCESS BLDA CALC-SCNC: 0.2 MMOL/L
BASE EXCESS BLDA CALC-SCNC: 2.3 MMOL/L
BASE EXCESS BLDV CALC-SCNC: 3.8 MMOL/L
BASE EXCESS BLDV CALC-SCNC: 4.5 MMOL/L
BASOPHILS # BLD AUTO: 0 10E9/L (ref 0–0.2)
BASOPHILS NFR BLD AUTO: 0.3 %
BILIRUB SERPL-MCNC: 0.6 MG/DL (ref 0.2–1.3)
BILIRUB UR QL STRIP: NEGATIVE
BLD PROD DISPENSED VOL BPU: 3000 ML
BLD PROD DISPENSED VOL BPU: 3000 ML
BLD PROD TYP BPU: NORMAL
BLD UNIT ID BPU: 0
BLOOD PRODUCT CODE: NORMAL
BPU ID: NORMAL
BUN SERPL-MCNC: 33 MG/DL (ref 7–30)
BUN SERPL-MCNC: 34 MG/DL (ref 7–30)
CA-I BLD-MCNC: 3.7 MG/DL (ref 4.4–5.2)
CA-I BLD-MCNC: 4.1 MG/DL (ref 4.4–5.2)
CA-I BLD-MCNC: 4.2 MG/DL (ref 4.4–5.2)
CALCIUM SERPL-MCNC: 8.3 MG/DL (ref 8.5–10.1)
CALCIUM SERPL-MCNC: 8.6 MG/DL (ref 8.5–10.1)
CHLORIDE SERPL-SCNC: 103 MMOL/L (ref 94–109)
CHLORIDE SERPL-SCNC: 105 MMOL/L (ref 94–109)
CI HYPERCOAGULATION INDEX: 2.2 RATIO (ref 0–3)
CI HYPERCOAGULATION INDEX: 2.3 RATIO (ref 0–3)
CLOT LYSIS 30M P MA LENFR BLD TEG: 1 % (ref 0–8)
CLOT LYSIS 30M P MA LENFR BLD TEG: 1 % (ref 0–8)
CLOT STRENGTH BLD TEG: 10.6 KD/SC (ref 5.3–13.2)
CLOT STRENGTH BLD TEG: 10.7 KD/SC (ref 5.3–13.2)
CO2 SERPL-SCNC: 26 MMOL/L (ref 20–32)
CO2 SERPL-SCNC: 27 MMOL/L (ref 20–32)
COLOR UR AUTO: YELLOW
CREAT SERPL-MCNC: 1.46 MG/DL (ref 0.66–1.25)
CREAT SERPL-MCNC: 1.52 MG/DL (ref 0.66–1.25)
DIFFERENTIAL METHOD BLD: ABNORMAL
EOSINOPHIL # BLD AUTO: 0.3 10E9/L (ref 0–0.7)
EOSINOPHIL NFR BLD AUTO: 3.2 %
ERYTHROCYTE [DISTWIDTH] IN BLOOD BY AUTOMATED COUNT: 14.7 % (ref 10–15)
ERYTHROCYTE [DISTWIDTH] IN BLOOD BY AUTOMATED COUNT: 14.8 % (ref 10–15)
FIBRINOGEN PPP-MCNC: 337 MG/DL (ref 200–420)
FIBRINOGEN PPP-MCNC: 535 MG/DL (ref 200–420)
GFR SERPL CREATININE-BSD FRML MDRD: 47 ML/MIN/{1.73_M2}
GFR SERPL CREATININE-BSD FRML MDRD: 49 ML/MIN/{1.73_M2}
GLUCOSE BLD-MCNC: 246 MG/DL (ref 70–99)
GLUCOSE BLD-MCNC: 273 MG/DL (ref 70–99)
GLUCOSE BLD-MCNC: 329 MG/DL (ref 70–99)
GLUCOSE BLDC GLUCOMTR-MCNC: 131 MG/DL (ref 70–99)
GLUCOSE BLDC GLUCOMTR-MCNC: 157 MG/DL (ref 70–99)
GLUCOSE BLDC GLUCOMTR-MCNC: 205 MG/DL (ref 70–99)
GLUCOSE BLDC GLUCOMTR-MCNC: 413 MG/DL (ref 70–99)
GLUCOSE SERPL-MCNC: 156 MG/DL (ref 70–99)
GLUCOSE SERPL-MCNC: 226 MG/DL (ref 70–99)
GLUCOSE UR STRIP-MCNC: 150 MG/DL
HCO3 BLD-SCNC: 26 MMOL/L (ref 21–28)
HCO3 BLD-SCNC: 27 MMOL/L (ref 21–28)
HCO3 BLDV-SCNC: 30 MMOL/L (ref 21–28)
HCO3 BLDV-SCNC: 30 MMOL/L (ref 21–28)
HCT VFR BLD AUTO: 32.6 % (ref 40–53)
HCT VFR BLD AUTO: 34 % (ref 40–53)
HGB BLD-MCNC: 10.6 G/DL (ref 13.3–17.7)
HGB BLD-MCNC: 10.8 G/DL (ref 13.3–17.7)
HGB BLD-MCNC: 7.6 G/DL (ref 13.3–17.7)
HGB BLD-MCNC: 7.7 G/DL (ref 13.3–17.7)
HGB BLD-MCNC: 8.7 G/DL (ref 13.3–17.7)
HGB UR QL STRIP: ABNORMAL
HYALINE CASTS #/AREA URNS LPF: 5 /LPF (ref 0–2)
IMM GRANULOCYTES # BLD: 0 10E9/L (ref 0–0.4)
IMM GRANULOCYTES NFR BLD: 0.5 %
INR PPP: 1.18 (ref 0.86–1.14)
INR PPP: 1.18 (ref 0.86–1.14)
INR PPP: 1.23 (ref 0.86–1.14)
K TIME TO SPEC CLOT STRENGTH: 1.2 MIN (ref 1–3)
K TIME TO SPEC CLOT STRENGTH: 1.4 MIN (ref 1–3)
KETONES UR STRIP-MCNC: NEGATIVE MG/DL
LACTATE BLD-SCNC: 1.4 MMOL/L (ref 0.7–2)
LACTATE BLD-SCNC: 1.9 MMOL/L (ref 0.7–2)
LACTATE BLD-SCNC: 4.2 MMOL/L (ref 0.7–2)
LEUKOCYTE ESTERASE UR QL STRIP: NEGATIVE
LY60 LYSIS AT 60 MINUTES: 3.2 % (ref 0–15)
LY60 LYSIS AT 60 MINUTES: 3.9 % (ref 0–15)
LYMPHOCYTES # BLD AUTO: 0.6 10E9/L (ref 0.8–5.3)
LYMPHOCYTES NFR BLD AUTO: 7.9 %
MA MAXIMUM CLOT STRENGTH: 67.9 MM (ref 55–74)
MA MAXIMUM CLOT STRENGTH: 68.1 MM (ref 55–74)
MAGNESIUM SERPL-MCNC: 1.8 MG/DL (ref 1.6–2.3)
MAGNESIUM SERPL-MCNC: 2.3 MG/DL (ref 1.6–2.3)
MCH RBC QN AUTO: 30.3 PG (ref 26.5–33)
MCH RBC QN AUTO: 30.7 PG (ref 26.5–33)
MCHC RBC AUTO-ENTMCNC: 31.8 G/DL (ref 31.5–36.5)
MCHC RBC AUTO-ENTMCNC: 32.5 G/DL (ref 31.5–36.5)
MCV RBC AUTO: 95 FL (ref 78–100)
MCV RBC AUTO: 96 FL (ref 78–100)
MONOCYTES # BLD AUTO: 0.8 10E9/L (ref 0–1.3)
MONOCYTES NFR BLD AUTO: 9.8 %
MUCOUS THREADS #/AREA URNS LPF: PRESENT /LPF
NEUTROPHILS # BLD AUTO: 6 10E9/L (ref 1.6–8.3)
NEUTROPHILS NFR BLD AUTO: 78.3 %
NITRATE UR QL: NEGATIVE
NRBC # BLD AUTO: 0 10*3/UL
NRBC BLD AUTO-RTO: 0 /100
NUM BPU REQUESTED: 11
NUM BPU REQUESTED: 12
NUM BPU REQUESTED: 4
O2/TOTAL GAS SETTING VFR VENT: 2 %
O2/TOTAL GAS SETTING VFR VENT: 75 %
O2/TOTAL GAS SETTING VFR VENT: 80 %
O2/TOTAL GAS SETTING VFR VENT: 80 %
OXYHGB MFR BLDV: 62 %
PCO2 BLD: 41 MM HG (ref 35–45)
PCO2 BLD: 48 MM HG (ref 35–45)
PCO2 BLDV: 46 MM HG (ref 40–50)
PCO2 BLDV: 50 MM HG (ref 40–50)
PF4 HEPARIN CMPLX AB SER QL: POSITIVE
PH BLD: 7.34 PH (ref 7.35–7.45)
PH BLD: 7.43 PH (ref 7.35–7.45)
PH BLDV: 7.39 PH (ref 7.32–7.43)
PH BLDV: 7.42 PH (ref 7.32–7.43)
PH UR STRIP: 6 PH (ref 5–7)
PHOSPHATE SERPL-MCNC: 2.5 MG/DL (ref 2.5–4.5)
PHOSPHATE SERPL-MCNC: 2.6 MG/DL (ref 2.5–4.5)
PLATELET # BLD AUTO: 89 10E9/L (ref 150–450)
PLATELET # BLD AUTO: 90 10E9/L (ref 150–450)
PLATELET # BLD AUTO: 99 10E9/L (ref 150–450)
PO2 BLD: 348 MM HG (ref 80–105)
PO2 BLD: 416 MM HG (ref 80–105)
PO2 BLDV: 35 MM HG (ref 25–47)
PO2 BLDV: 46 MM HG (ref 25–47)
POTASSIUM BLD-SCNC: 3.6 MMOL/L (ref 3.4–5.3)
POTASSIUM BLD-SCNC: 3.7 MMOL/L (ref 3.4–5.3)
POTASSIUM BLD-SCNC: 3.8 MMOL/L (ref 3.4–5.3)
POTASSIUM SERPL-SCNC: 3.8 MMOL/L (ref 3.4–5.3)
POTASSIUM SERPL-SCNC: 3.9 MMOL/L (ref 3.4–5.3)
PROT SERPL-MCNC: 7 G/DL (ref 6.8–8.8)
R TIME UNTIL CLOT FORMS: 4.9 MIN (ref 4–9)
R TIME UNTIL CLOT FORMS: 5 MIN (ref 4–9)
RBC # BLD AUTO: 3.45 10E12/L (ref 4.4–5.9)
RBC # BLD AUTO: 3.56 10E12/L (ref 4.4–5.9)
RBC #/AREA URNS AUTO: 6 /HPF (ref 0–2)
SODIUM BLD-SCNC: 137 MMOL/L (ref 133–144)
SODIUM BLD-SCNC: 137 MMOL/L (ref 133–144)
SODIUM BLD-SCNC: 140 MMOL/L (ref 133–144)
SODIUM SERPL-SCNC: 134 MMOL/L (ref 133–144)
SODIUM SERPL-SCNC: 136 MMOL/L (ref 133–144)
SOURCE: ABNORMAL
SP GR UR STRIP: 1.01 (ref 1–1.03)
SPECIMEN SOURCE: NORMAL
TRANSFUSION STATUS PATIENT QL: NORMAL
UROBILINOGEN UR STRIP-MCNC: 2 MG/DL (ref 0–2)
WBC # BLD AUTO: 7.7 10E9/L (ref 4–11)
WBC # BLD AUTO: 7.9 10E9/L (ref 4–11)
WBC #/AREA URNS AUTO: <1 /HPF (ref 0–5)

## 2020-07-19 PROCEDURE — 25000128 H RX IP 250 OP 636

## 2020-07-19 PROCEDURE — 86665 EPSTEIN-BARR CAPSID VCA: CPT | Performed by: STUDENT IN AN ORGANIZED HEALTH CARE EDUCATION/TRAINING PROGRAM

## 2020-07-19 PROCEDURE — 25000128 H RX IP 250 OP 636: Performed by: INTERNAL MEDICINE

## 2020-07-19 PROCEDURE — 85027 COMPLETE CBC AUTOMATED: CPT | Performed by: SURGERY

## 2020-07-19 PROCEDURE — 25000128 H RX IP 250 OP 636: Performed by: NURSE ANESTHETIST, CERTIFIED REGISTERED

## 2020-07-19 PROCEDURE — 25000565 ZZH ISOFLURANE, EA 15 MIN: Performed by: PEDIATRICS

## 2020-07-19 PROCEDURE — 85610 PROTHROMBIN TIME: CPT | Performed by: INTERNAL MEDICINE

## 2020-07-19 PROCEDURE — 25800030 ZZH RX IP 258 OP 636: Performed by: NURSE ANESTHETIST, CERTIFIED REGISTERED

## 2020-07-19 PROCEDURE — 25000128 H RX IP 250 OP 636: Performed by: SURGERY

## 2020-07-19 PROCEDURE — P9016 RBC LEUKOCYTES REDUCED: HCPCS | Performed by: STUDENT IN AN ORGANIZED HEALTH CARE EDUCATION/TRAINING PROGRAM

## 2020-07-19 PROCEDURE — 85396 CLOTTING ASSAY WHOLE BLOOD: CPT | Performed by: INTERNAL MEDICINE

## 2020-07-19 PROCEDURE — 41000019 ZZH PERA-PERFUSION EACH ADDTL 15 MIN: Performed by: PEDIATRICS

## 2020-07-19 PROCEDURE — 25000131 ZZH RX MED GY IP 250 OP 636 PS 637: Performed by: INTERNAL MEDICINE

## 2020-07-19 PROCEDURE — 33945 TRANSPLANTATION OF HEART: CPT | Performed by: PEDIATRICS

## 2020-07-19 PROCEDURE — 81200010 ZZH ACQUISITION HEART CADAVER

## 2020-07-19 PROCEDURE — C1751 CATH, INF, PER/CENT/MIDLINE: HCPCS

## 2020-07-19 PROCEDURE — 02YA0Z0 TRANSPLANTATION OF HEART, ALLOGENEIC, OPEN APPROACH: ICD-10-PCS | Performed by: PEDIATRICS

## 2020-07-19 PROCEDURE — 86644 CMV ANTIBODY: CPT | Performed by: STUDENT IN AN ORGANIZED HEALTH CARE EDUCATION/TRAINING PROGRAM

## 2020-07-19 PROCEDURE — 84100 ASSAY OF PHOSPHORUS: CPT | Performed by: STUDENT IN AN ORGANIZED HEALTH CARE EDUCATION/TRAINING PROGRAM

## 2020-07-19 PROCEDURE — 27210460 ZZH PUMP APP ADULT PERFUSION: Performed by: PEDIATRICS

## 2020-07-19 PROCEDURE — 33241 REMOVE PULSE GENERATOR: CPT | Mod: 51 | Performed by: PEDIATRICS

## 2020-07-19 PROCEDURE — 40000275 ZZH STATISTIC RCP TIME EA 10 MIN

## 2020-07-19 PROCEDURE — 25000132 ZZH RX MED GY IP 250 OP 250 PS 637: Performed by: SURGERY

## 2020-07-19 PROCEDURE — 25000128 H RX IP 250 OP 636: Performed by: ANESTHESIOLOGY

## 2020-07-19 PROCEDURE — 99233 SBSQ HOSP IP/OBS HIGH 50: CPT | Mod: 25 | Performed by: INTERNAL MEDICINE

## 2020-07-19 PROCEDURE — 83605 ASSAY OF LACTIC ACID: CPT

## 2020-07-19 PROCEDURE — 99223 1ST HOSP IP/OBS HIGH 75: CPT | Mod: GC | Performed by: INTERNAL MEDICINE

## 2020-07-19 PROCEDURE — 86901 BLOOD TYPING SEROLOGIC RH(D): CPT | Performed by: PHYSICIAN ASSISTANT

## 2020-07-19 PROCEDURE — 85610 PROTHROMBIN TIME: CPT | Performed by: STUDENT IN AN ORGANIZED HEALTH CARE EDUCATION/TRAINING PROGRAM

## 2020-07-19 PROCEDURE — 86850 RBC ANTIBODY SCREEN: CPT | Performed by: PHYSICIAN ASSISTANT

## 2020-07-19 PROCEDURE — 3E043XZ INTRODUCTION OF VASOPRESSOR INTO CENTRAL VEIN, PERCUTANEOUS APPROACH: ICD-10-PCS | Performed by: PEDIATRICS

## 2020-07-19 PROCEDURE — 40000986 XR CHEST PORT 1 VW

## 2020-07-19 PROCEDURE — 25000128 H RX IP 250 OP 636: Performed by: PHYSICIAN ASSISTANT

## 2020-07-19 PROCEDURE — 82150 ASSAY OF AMYLASE: CPT | Performed by: STUDENT IN AN ORGANIZED HEALTH CARE EDUCATION/TRAINING PROGRAM

## 2020-07-19 PROCEDURE — 71045 X-RAY EXAM CHEST 1 VIEW: CPT

## 2020-07-19 PROCEDURE — 40000344 ZZHCL STATISTIC THAWING COMPONENT: Performed by: PATHOLOGY

## 2020-07-19 PROCEDURE — 40000048 ZZH STATISTIC DAILY SWAN MONITORING

## 2020-07-19 PROCEDURE — 84295 ASSAY OF SERUM SODIUM: CPT

## 2020-07-19 PROCEDURE — 85025 COMPLETE CBC W/AUTO DIFF WBC: CPT | Performed by: STUDENT IN AN ORGANIZED HEALTH CARE EDUCATION/TRAINING PROGRAM

## 2020-07-19 PROCEDURE — 83735 ASSAY OF MAGNESIUM: CPT | Performed by: STUDENT IN AN ORGANIZED HEALTH CARE EDUCATION/TRAINING PROGRAM

## 2020-07-19 PROCEDURE — 87389 HIV-1 AG W/HIV-1&-2 AB AG IA: CPT | Performed by: STUDENT IN AN ORGANIZED HEALTH CARE EDUCATION/TRAINING PROGRAM

## 2020-07-19 PROCEDURE — 36514 APHERESIS PLASMA: CPT

## 2020-07-19 PROCEDURE — 80048 BASIC METABOLIC PNL TOTAL CA: CPT | Performed by: SURGERY

## 2020-07-19 PROCEDURE — 82330 ASSAY OF CALCIUM: CPT

## 2020-07-19 PROCEDURE — 85730 THROMBOPLASTIN TIME PARTIAL: CPT | Performed by: STUDENT IN AN ORGANIZED HEALTH CARE EDUCATION/TRAINING PROGRAM

## 2020-07-19 PROCEDURE — 84100 ASSAY OF PHOSPHORUS: CPT | Performed by: SURGERY

## 2020-07-19 PROCEDURE — P9059 PLASMA, FRZ BETWEEN 8-24HOUR: HCPCS | Performed by: INTERNAL MEDICINE

## 2020-07-19 PROCEDURE — 80053 COMPREHEN METABOLIC PANEL: CPT | Performed by: STUDENT IN AN ORGANIZED HEALTH CARE EDUCATION/TRAINING PROGRAM

## 2020-07-19 PROCEDURE — 25000125 ZZHC RX 250: Performed by: NURSE ANESTHETIST, CERTIFIED REGISTERED

## 2020-07-19 PROCEDURE — 40000076 ZZH STATISTIC IABP MONITORING

## 2020-07-19 PROCEDURE — B246ZZ4 ULTRASONOGRAPHY OF RIGHT AND LEFT HEART, TRANSESOPHAGEAL: ICD-10-PCS | Performed by: PEDIATRICS

## 2020-07-19 PROCEDURE — 85384 FIBRINOGEN ACTIVITY: CPT | Performed by: INTERNAL MEDICINE

## 2020-07-19 PROCEDURE — 83735 ASSAY OF MAGNESIUM: CPT | Performed by: SURGERY

## 2020-07-19 PROCEDURE — 25800030 ZZH RX IP 258 OP 636: Performed by: PHYSICIAN ASSISTANT

## 2020-07-19 PROCEDURE — 0JPT0PZ REMOVAL OF CARDIAC RHYTHM RELATED DEVICE FROM TRUNK SUBCUTANEOUS TISSUE AND FASCIA, OPEN APPROACH: ICD-10-PCS | Performed by: PEDIATRICS

## 2020-07-19 PROCEDURE — 25000125 ZZHC RX 250

## 2020-07-19 PROCEDURE — 27210447 ZZH PACK CELL SAVER CSP: Performed by: PEDIATRICS

## 2020-07-19 PROCEDURE — 36000074 ZZH SURGERY LEVEL 6 1ST 30 MIN - UMMC: Performed by: PEDIATRICS

## 2020-07-19 PROCEDURE — 27210794 ZZH OR GENERAL SUPPLY STERILE: Performed by: PEDIATRICS

## 2020-07-19 PROCEDURE — 84132 ASSAY OF SERUM POTASSIUM: CPT

## 2020-07-19 PROCEDURE — 40000196 ZZH STATISTIC RAPCV CVP MONITORING

## 2020-07-19 PROCEDURE — 27210139 ZZH KIT CATH SVO2/CCO PA SUPPLY

## 2020-07-19 PROCEDURE — 25000131 ZZH RX MED GY IP 250 OP 636 PS 637: Performed by: PHYSICIAN ASSISTANT

## 2020-07-19 PROCEDURE — 5A1221Z PERFORMANCE OF CARDIAC OUTPUT, CONTINUOUS: ICD-10-PCS | Performed by: PEDIATRICS

## 2020-07-19 PROCEDURE — 25800030 ZZH RX IP 258 OP 636: Performed by: ANESTHESIOLOGY

## 2020-07-19 PROCEDURE — 37000008 ZZH ANESTHESIA TECHNICAL FEE, 1ST 30 MIN: Performed by: PEDIATRICS

## 2020-07-19 PROCEDURE — 40000344 ZZHCL STATISTIC THAWING COMPONENT: Performed by: INTERNAL MEDICINE

## 2020-07-19 PROCEDURE — 82805 BLOOD GASES W/O2 SATURATION: CPT | Performed by: STUDENT IN AN ORGANIZED HEALTH CARE EDUCATION/TRAINING PROGRAM

## 2020-07-19 PROCEDURE — 41000018 ZZH PER-PERFUSION 1ST 30 MIN: Performed by: PEDIATRICS

## 2020-07-19 PROCEDURE — 82947 ASSAY GLUCOSE BLOOD QUANT: CPT

## 2020-07-19 PROCEDURE — 25000128 H RX IP 250 OP 636: Performed by: PATHOLOGY

## 2020-07-19 PROCEDURE — 27210136 ZZH KIT CATH ARTERIAL EXT SUPPLY

## 2020-07-19 PROCEDURE — 81001 URINALYSIS AUTO W/SCOPE: CPT | Performed by: PHYSICIAN ASSISTANT

## 2020-07-19 PROCEDURE — 20000004 ZZH R&B ICU UMMC

## 2020-07-19 PROCEDURE — 37000009 ZZH ANESTHESIA TECHNICAL FEE, EACH ADDTL 15 MIN: Performed by: PEDIATRICS

## 2020-07-19 PROCEDURE — 85730 THROMBOPLASTIN TIME PARTIAL: CPT | Performed by: INTERNAL MEDICINE

## 2020-07-19 PROCEDURE — 25000125 ZZHC RX 250: Performed by: PATHOLOGY

## 2020-07-19 PROCEDURE — 25000132 ZZH RX MED GY IP 250 OP 250 PS 637: Performed by: STUDENT IN AN ORGANIZED HEALTH CARE EDUCATION/TRAINING PROGRAM

## 2020-07-19 PROCEDURE — 25000132 ZZH RX MED GY IP 250 OP 250 PS 637: Performed by: INTERNAL MEDICINE

## 2020-07-19 PROCEDURE — P9059 PLASMA, FRZ BETWEEN 8-24HOUR: HCPCS | Performed by: PATHOLOGY

## 2020-07-19 PROCEDURE — 82803 BLOOD GASES ANY COMBINATION: CPT

## 2020-07-19 PROCEDURE — 36000076 ZZH SURGERY LEVEL 6 EA 15 ADDTL MIN - UMMC: Performed by: PEDIATRICS

## 2020-07-19 PROCEDURE — 40000014 ZZH STATISTIC ARTERIAL MONITORING DAILY

## 2020-07-19 PROCEDURE — 00000146 ZZHCL STATISTIC GLUCOSE BY METER IP

## 2020-07-19 PROCEDURE — 25000128 H RX IP 250 OP 636: Performed by: STUDENT IN AN ORGANIZED HEALTH CARE EDUCATION/TRAINING PROGRAM

## 2020-07-19 PROCEDURE — 85049 AUTOMATED PLATELET COUNT: CPT | Performed by: INTERNAL MEDICINE

## 2020-07-19 PROCEDURE — 25800030 ZZH RX IP 258 OP 636

## 2020-07-19 RX ORDER — FIBRINOGEN (HUMAN) 700-1300MG
1 KIT INTRAVENOUS ONCE
Status: DISCONTINUED | OUTPATIENT
Start: 2020-07-20 | End: 2020-07-19

## 2020-07-19 RX ORDER — FUROSEMIDE 10 MG/ML
40 INJECTION INTRAMUSCULAR; INTRAVENOUS DAILY
Status: DISCONTINUED | OUTPATIENT
Start: 2020-07-20 | End: 2020-07-20

## 2020-07-19 RX ORDER — MAGNESIUM HYDROXIDE 1200 MG/15ML
LIQUID ORAL PRN
Status: DISCONTINUED | OUTPATIENT
Start: 2020-07-19 | End: 2020-07-20 | Stop reason: HOSPADM

## 2020-07-19 RX ORDER — FIBRINOGEN (HUMAN) 700-1300MG
1050 KIT INTRAVENOUS ONCE
Status: DISCONTINUED | OUTPATIENT
Start: 2020-07-20 | End: 2020-07-20 | Stop reason: CLARIF

## 2020-07-19 RX ORDER — CALCIUM GLUCONATE 100 MG/ML
AMPUL (ML) INTRAVENOUS
Status: COMPLETED | OUTPATIENT
Start: 2020-07-19 | End: 2020-07-19

## 2020-07-19 RX ORDER — SODIUM CHLORIDE, SODIUM LACTATE, POTASSIUM CHLORIDE, CALCIUM CHLORIDE 600; 310; 30; 20 MG/100ML; MG/100ML; MG/100ML; MG/100ML
INJECTION, SOLUTION INTRAVENOUS CONTINUOUS PRN
Status: DISCONTINUED | OUTPATIENT
Start: 2020-07-19 | End: 2020-07-20

## 2020-07-19 RX ORDER — FIBRINOGEN (HUMAN) 700-1300MG
1050 KIT INTRAVENOUS ONCE
Status: COMPLETED | OUTPATIENT
Start: 2020-07-20 | End: 2020-07-20

## 2020-07-19 RX ORDER — DIPHENHYDRAMINE HYDROCHLORIDE 50 MG/ML
50 INJECTION INTRAMUSCULAR; INTRAVENOUS
Status: CANCELLED | OUTPATIENT
Start: 2020-07-19

## 2020-07-19 RX ORDER — MYCOPHENOLATE MOFETIL 250 MG/1
1500 CAPSULE ORAL EVERY 12 HOURS
Status: DISCONTINUED | OUTPATIENT
Start: 2020-07-19 | End: 2020-07-19

## 2020-07-19 RX ORDER — VANCOMYCIN HYDROCHLORIDE 1 G/200ML
1000 INJECTION, SOLUTION INTRAVENOUS SEE ADMIN INSTRUCTIONS
Status: DISCONTINUED | OUTPATIENT
Start: 2020-07-19 | End: 2020-07-20 | Stop reason: HOSPADM

## 2020-07-19 RX ORDER — CEFEPIME HYDROCHLORIDE 1 G/1
1 INJECTION, POWDER, FOR SOLUTION INTRAMUSCULAR; INTRAVENOUS SEE ADMIN INSTRUCTIONS
Status: DISCONTINUED | OUTPATIENT
Start: 2020-07-19 | End: 2020-07-20

## 2020-07-19 RX ORDER — VANCOMYCIN HYDROCHLORIDE 1 G/200ML
1000 INJECTION, SOLUTION INTRAVENOUS
Status: DISCONTINUED | OUTPATIENT
Start: 2020-07-19 | End: 2020-07-20 | Stop reason: HOSPADM

## 2020-07-19 RX ORDER — SODIUM CHLORIDE, SODIUM GLUCONATE, SODIUM ACETATE, POTASSIUM CHLORIDE AND MAGNESIUM CHLORIDE 526; 502; 368; 37; 30 MG/100ML; MG/100ML; MG/100ML; MG/100ML; MG/100ML
INJECTION, SOLUTION INTRAVENOUS CONTINUOUS PRN
Status: DISCONTINUED | OUTPATIENT
Start: 2020-07-19 | End: 2020-07-20

## 2020-07-19 RX ORDER — NOREPINEPHRINE BITARTRATE 0.06 MG/ML
0.03-0.4 INJECTION, SOLUTION INTRAVENOUS CONTINUOUS
Status: DISCONTINUED | OUTPATIENT
Start: 2020-07-19 | End: 2020-07-20

## 2020-07-19 RX ORDER — HEPARIN SODIUM 1000 [USP'U]/ML
INJECTION, SOLUTION INTRAVENOUS; SUBCUTANEOUS PRN
Status: DISCONTINUED | OUTPATIENT
Start: 2020-07-19 | End: 2020-07-20

## 2020-07-19 RX ORDER — MYCOPHENOLATE MOFETIL 250 MG/1
1500 CAPSULE ORAL EVERY 12 HOURS
Status: DISCONTINUED | OUTPATIENT
Start: 2020-07-19 | End: 2020-07-20 | Stop reason: HOSPADM

## 2020-07-19 RX ORDER — VASOPRESSIN 20 U/ML
INJECTION PARENTERAL PRN
Status: DISCONTINUED | OUTPATIENT
Start: 2020-07-19 | End: 2020-07-20

## 2020-07-19 RX ORDER — LIDOCAINE HYDROCHLORIDE 20 MG/ML
INJECTION, SOLUTION INFILTRATION; PERINEURAL PRN
Status: DISCONTINUED | OUTPATIENT
Start: 2020-07-19 | End: 2020-07-20

## 2020-07-19 RX ORDER — LIDOCAINE 40 MG/G
CREAM TOPICAL
Status: DISCONTINUED | OUTPATIENT
Start: 2020-07-19 | End: 2020-07-20 | Stop reason: HOSPADM

## 2020-07-19 RX ORDER — ETOMIDATE 2 MG/ML
INJECTION INTRAVENOUS PRN
Status: DISCONTINUED | OUTPATIENT
Start: 2020-07-19 | End: 2020-07-20

## 2020-07-19 RX ORDER — FENTANYL CITRATE 50 UG/ML
INJECTION, SOLUTION INTRAMUSCULAR; INTRAVENOUS PRN
Status: DISCONTINUED | OUTPATIENT
Start: 2020-07-19 | End: 2020-07-20

## 2020-07-19 RX ADMIN — ANTICOAGULANT CITRATE DEXTROSE SOLUTION FORMULA A 524 ML: 12.25; 11; 3.65 SOLUTION INTRAVENOUS at 17:30

## 2020-07-19 RX ADMIN — VANCOMYCIN HYDROCHLORIDE 1000 MG: 1 INJECTION, SOLUTION INTRAVENOUS at 20:15

## 2020-07-19 RX ADMIN — MAGNESIUM SULFATE IN WATER 2 G: 40 INJECTION, SOLUTION INTRAVENOUS at 06:54

## 2020-07-19 RX ADMIN — CEFTRIAXONE 1 G: 1 INJECTION, POWDER, FOR SOLUTION INTRAMUSCULAR; INTRAVENOUS at 10:51

## 2020-07-19 RX ADMIN — DOCUSATE SODIUM AND SENNOSIDES 2 TABLET: 8.6; 5 TABLET ORAL at 07:39

## 2020-07-19 RX ADMIN — CEFEPIME HYDROCHLORIDE 2 G: 2 INJECTION, POWDER, FOR SOLUTION INTRAVENOUS at 20:20

## 2020-07-19 RX ADMIN — LIDOCAINE HYDROCHLORIDE 100 MG: 20 INJECTION, SOLUTION INFILTRATION; PERINEURAL at 19:57

## 2020-07-19 RX ADMIN — MIDAZOLAM HYDROCHLORIDE 4 MG: 1 INJECTION, SOLUTION INTRAMUSCULAR; INTRAVENOUS at 19:57

## 2020-07-19 RX ADMIN — ATORVASTATIN CALCIUM 40 MG: 40 TABLET, FILM COATED ORAL at 07:39

## 2020-07-19 RX ADMIN — POLYETHYLENE GLYCOL 3350 17 G: 17 POWDER, FOR SOLUTION ORAL at 07:39

## 2020-07-19 RX ADMIN — CALCIUM GLUCONATE 3.37 G: 98 INJECTION, SOLUTION INTRAVENOUS at 22:30

## 2020-07-19 RX ADMIN — EPINEPHRINE 0.03 MCG/KG/MIN: 1 INJECTION PARENTERAL at 23:26

## 2020-07-19 RX ADMIN — SODIUM CHLORIDE, SODIUM GLUCONATE, SODIUM ACETATE, POTASSIUM CHLORIDE AND MAGNESIUM CHLORIDE: 526; 502; 368; 37; 30 INJECTION, SOLUTION INTRAVENOUS at 19:26

## 2020-07-19 RX ADMIN — SODIUM CHLORIDE 20 MG: 9 INJECTION, SOLUTION INTRAVENOUS at 23:45

## 2020-07-19 RX ADMIN — VASOPRESSIN 1 UNITS: 20 INJECTION INTRAVENOUS at 22:02

## 2020-07-19 RX ADMIN — INSULIN HUMAN 10 UNITS: 100 INJECTION, SOLUTION PARENTERAL at 23:50

## 2020-07-19 RX ADMIN — CALCIUM GLUCONATE 3.7 G: 98 INJECTION, SOLUTION INTRAVENOUS at 17:30

## 2020-07-19 RX ADMIN — MYCOPHENOLATE MOFETIL 1500 MG: 250 CAPSULE ORAL at 18:58

## 2020-07-19 RX ADMIN — ISOPROTERENOL HYDROCHLORIDE 0.03 MCG/KG/MIN: 0.2 INJECTION, SOLUTION INTRAMUSCULAR; INTRAVENOUS at 23:26

## 2020-07-19 RX ADMIN — FENTANYL CITRATE 250 MCG: 50 INJECTION, SOLUTION INTRAMUSCULAR; INTRAVENOUS at 19:57

## 2020-07-19 RX ADMIN — MYCOPHENOLATE MOFETIL 1500 MG: 250 CAPSULE ORAL at 15:55

## 2020-07-19 RX ADMIN — ANTICOAGULANT CITRATE DEXTROSE SOLUTION FORMULA A 2 ML: 12.25; 11; 3.65 SOLUTION INTRAVENOUS at 19:00

## 2020-07-19 RX ADMIN — EPINEPHRINE 10 MCG: 1 INJECTION PARENTERAL at 20:01

## 2020-07-19 RX ADMIN — SODIUM CHLORIDE 1000 MG: 9 INJECTION, SOLUTION INTRAVENOUS at 19:25

## 2020-07-19 RX ADMIN — SODIUM CHLORIDE 1000 ML: 900 IRRIGANT IRRIGATION at 22:59

## 2020-07-19 RX ADMIN — POTASSIUM CHLORIDE 20 MEQ: 750 TABLET, EXTENDED RELEASE ORAL at 06:53

## 2020-07-19 RX ADMIN — Medication 1 UNITS: at 20:02

## 2020-07-19 RX ADMIN — AMINOCAPROIC ACID 1 G/HR: 250 INJECTION, SOLUTION INTRAVENOUS at 21:33

## 2020-07-19 RX ADMIN — Medication 0.5 UNITS: at 23:57

## 2020-07-19 RX ADMIN — ETOMIDATE 16 MG: 2 INJECTION INTRAVENOUS at 19:57

## 2020-07-19 RX ADMIN — AMIODARONE HYDROCHLORIDE 200 MG: 200 TABLET ORAL at 07:39

## 2020-07-19 RX ADMIN — ROCURONIUM BROMIDE 100 MG: 10 INJECTION INTRAVENOUS at 19:57

## 2020-07-19 RX ADMIN — Medication 1 UNITS: at 21:34

## 2020-07-19 RX ADMIN — FUROSEMIDE 40 MG: 10 INJECTION, SOLUTION INTRAVENOUS at 07:39

## 2020-07-19 RX ADMIN — ANTICOAGULANT CITRATE DEXTROSE SOLUTION FORMULA A 538 ML: 12.25; 11; 3.65 SOLUTION INTRAVENOUS at 22:30

## 2020-07-19 RX ADMIN — Medication 1 UNITS: at 20:47

## 2020-07-19 RX ADMIN — ROCURONIUM BROMIDE 50 MG: 10 INJECTION INTRAVENOUS at 23:52

## 2020-07-19 RX ADMIN — HEPARIN SODIUM 48000 UNITS: 1000 INJECTION INTRAVENOUS; SUBCUTANEOUS at 21:54

## 2020-07-19 RX ADMIN — AMINOCAPROIC ACID 5 G: 250 INJECTION, SOLUTION INTRAVENOUS at 20:20

## 2020-07-19 RX ADMIN — ASPIRIN 81 MG CHEWABLE TABLET 81 MG: 81 TABLET CHEWABLE at 07:39

## 2020-07-19 RX ADMIN — HUMAN INSULIN 1.5 UNITS/HR: 100 INJECTION, SOLUTION SUBCUTANEOUS at 21:02

## 2020-07-19 RX ADMIN — SODIUM CHLORIDE, POTASSIUM CHLORIDE, SODIUM LACTATE AND CALCIUM CHLORIDE: 600; 310; 30; 20 INJECTION, SOLUTION INTRAVENOUS at 20:15

## 2020-07-19 ASSESSMENT — ACTIVITIES OF DAILY LIVING (ADL)
ADLS_ACUITY_SCORE: 14

## 2020-07-19 ASSESSMENT — MIFFLIN-ST. JEOR: SCORE: 1444.88

## 2020-07-19 ASSESSMENT — ENCOUNTER SYMPTOMS: DYSRHYTHMIAS: 1

## 2020-07-19 NOTE — PROCEDURES
Cardiac ICU Procedure Note  CVC Line Placement     Patient's Name: Lucian Henderson Merly  Service performing procedure: Cardiac ICU  Attending Physician:   Indication for procedure:Access     Acuity:Elective   Procedure date: 07/19/20    A Time Out was performed immediately prior to the procedure to confirm correct patient, procedure, and site (site marked if applicable): Yes     Preparation for Procedure:   Hand hygiene performed prior to insertion: yes   Barrier precautions used (large sterile drape, gown, mask, sterile gloves, cap): yes   Skin preparation with chlorhexidine gluconate: yes   Skin preparation agent was allowed to dry: yes   Anesthesia used? Local     Temporary HD line was placed.   Side:left   Site:Internal Jugular (IJ)   Ultrasound used? Yes  Type of catheter placed:HD line   Insertion depth (cm):1.5 cm   Post-venous cannulation confirmed by:Appropriate venous blood return, ultrasound visualization of wire in IJ  Number of attempts:1     The patient tolerated the procedure well except for complications as noted. Immediate complications:NONE   Chest X-ray was ordered for Internal Jugular (IJ) line placement or attempt.    Fina Biswas  Cardiology PGY5    July 19, 2020      I have reviewed today's vital signs, notes, medications, labs and imaging.  I have also seen and examined the patient and agree with the findings and plan as outlined above.    James Terrazas MD, PhD  Professor, Heart Failure and Cardiac Transplantation  [facility]

## 2020-07-19 NOTE — PROGRESS NOTES
[facility]   Cardiology II Service / Advanced Heart Failure  Progress note    Lucian Morillo  : 1955  MRN # [id number]    ADMIT DATE: 7/3/2020    Changes today  - Follow up HIT  - Holding heparin for now  - Lasix to 40 mg IV QD    ASSESSMENT & PLAN:  Lucian Henderson Sr. is a 64 year old male with a PMH of HTN, DMII, obesity, CKD, CHANTEL, ischemic cardiomyopathy and severe LV systolic dysfunction s/p 3V CABG () and primary prevention ICD ( 4/2/15). Currently he presents with 3 days of worsening fatigue and SOB with minimal exertion. Patient claims he could walk 1-2 blocks last week but has been barely able to ambulate home recently.     Admitted for possible LVAD/Transplant work up. IABP and listed status 2 for heart transplant .    Date RA MPAP PCWP SV02 Jacy CO Jacy CI MAP SVR PVR Intervention   20 8 50 38 49 2.3 1.28 89 2400 5.2    20  6 48 28 35 4.0 2.1 69 1300 2.2 Nipride, PO afterload reduction    7/10 9 56/34 32 68 5.1 2.7 68 900 1.8 Nipride 1, PO afterload reduction    14 65/42/50 36 60 4.3 2.2 89 1100 1.8 Nipride 0.25, PO afterload reduction    7/15 8 52/28/36 24 66 4.1 2.2 100 1200 2.9 IABP 1:1, PO afterload reduction     7 50/24/33 24 70 5.6 3.0 94 1200 1.6 IABP 1:1, Dopamine 2.5    9 56/24/35 22 62 4.8 2.6 75 1100 2.7 IABP 1:1       # ICM s/p 3V CABG () and primary prevention ICD ( 4/2/15)  #HFrEF Stage D, NYHA III-IV  # Ambulatory cardiogenic shock  # Arrhythmia: HF associated VT/VF  Currently he presents with 3 days of worsening fatigue and SOB with minimal exertion, cardiac cachexia and cold extremities. Admitted for possible LVAD/Transplant work up. - Echocardiogram showed an EF of 15% on 20  - Diuresis : lasix 40 mg IV QD  - ASA 81 mg daily  - Atorvastatin 40 daily  - Hydralazine 75 TID, IMDUR 60 mg (held 7/15)  - Dopamine 2.5 (started 7/15)   - Stop losartan   - He has had a colonoscopy on 19    - NPO at [facility] for  "dental extraction as part of pre-heart tx work-up   - s/p tooth extractions 7/13  - 24 hours Augmentin for zander-op ppx given teeth removal and plan for IABP   - IABP and listed status 2 for heart transplant 7/14, s/p transition from femoral to subclavian IABP 7/16  - continue to trend hemodynamics q24h    #Atrial Flutter with RVR  Patient went into Atrial flutter with RVR with rates around 150 overnight on 7/7/2020 at around 2 am. 5 mg IV beta-blocker was administered but this significantly lowered the patient's blood pressure.  - 5 mg IV beta-blocker administered and heparin started on 7/7/20 over night when patient went into flutter.    - Stopped Lasix, 250 ml LR Bolus (7/6/20)  - IV Amiodarone bolus 150 , followed by 1mg/min infusion for 6h followed by 0.5mg/min, started amiodarone 400 mg PO BID 7/9 (will do BID dosing for ~ 5 days, 7/14 can go to daily dosing)   - Digoxin  mcg (7/6/20)    # Thrombocytopenia  - HIT to be sent at 8pm, heparin stopped at noon 7/18    # DMII ( A1c: 8.3)   - Finger sticks  - Hypoglycemia protocol   - Moderate intensity sliding scale insulin       #CKD  - Monitor Scr and K Q12h   - Avoid nephrotoxic agents    Floor Care  Fluids: 1.5L fluid restriction  Food: 2 gram sodium diet, low fat diet  Electrolyte repletion: potassium/magnesium sliding scale  DVT ppx: Lovenox  Glycemic Control: sliding scale insulin   Lines: PICC   Consults: none  Code Status: full    Patient was discussed with attending physician, Dr. Terrazas.     Elías \"Mint\" Marvin  PGY-3 IM  Pager [phone]  ..........................................................................................................................................................  Subjective:  SARITA overnight. Nurses notes reviewed.   Slept well. Denies SOB or chest pain.      PHYSICAL EXAM:  BP (!) 153/50   Pulse 80   Temp 98.5  F (36.9  C) (Oral)   Resp 16   Ht 1.626 m (5' 4\")   Wt 77 kg (169 lb 12.1 oz)   SpO2 100%   BMI " 29.14 kg/m    GENERAL: NAD  NECK: Supple and without lymphadenopathy. JVP 11 cm  CV: S1/S2 heard without murmur   RESPIRATORY: CTAB  GI: Soft and distended. No tenderness, rebound, guarding. No palpable organomegaly.   EXTREMITIES: Peripheral edema trace.   NEUROLOGIC: Alert and orientated x 3.   MUSCULOSKELETAL: No joint swelling or tenderness.   SKIN: No jaundice. No acute rashes or lesions.     ROS:   12-pt ROS otherwise negative except as noted above    PMH:  Past Medical History:   Diagnosis Date     CAD (coronary artery disease)      CHF (congestive heart failure) (H)      CKD (chronic kidney disease), stage III (H)      Cortical cataract of both eyes      Diabetes (H)      Hyperlipidemia      Hypertension      Ischemic cardiomyopathy      Obesity      CHANTEL (obstructive sleep apnea)     occas cpap     Osteoarthritis        PSH:  Past Surgical History:   Procedure Laterality Date     C CABG, ARTERY-VEIN, THREE  02/2008     CATARACT IOL, RT/LT       COLONOSCOPY N/A 8/7/2019    Procedure: COLONOSCOPY, WITH POLYPECTOMY AND BIOPSY;  Surgeon: Chauncey Morataya MD;  Location:  GI     CV RIGHT HEART CATH N/A 3/25/2019    Procedure: CV RIGHT HEART CATH;  Surgeon: Moises Santos MD;  Location:  HEART CARDIAC CATH LAB     CV RIGHT HEART CATH N/A 7/10/2019    Procedure: CV RIGHT HEART CATH;  Surgeon: Jak Mccabe MD;  Location:  HEART CARDIAC CATH LAB     CV RIGHT HEART CATH N/A 7/8/2020    Procedure: Right Heart Cath with Leave In [place] already has ICU load;  Surgeon: Jak Mccabe MD;  Location:  HEART CARDIAC CATH LAB     EXTRACTION(S) DENTAL Left 7/13/2020    Procedure: EXTRACTION, TOOTH #11, 12, 13, 15, and 29;  Surgeon: Monica Chao DDS;  Location:  OR     IMPLANT AUTOMATIC IMPLANTABLE CARDIOVERTER DEFIBRILLATOR       INSERT INTRAAORTIC BALLOON PUMP N/A 7/16/2020    Procedure: Subclavian Intra-Aortic Balloon Pump Placement;  Surgeon: Allan Sparrow MD;  Location:  OR      PHACOEMULSIFICATION CLEAR CORNEA WITH STANDARD IOL, VITRECTOMY PARSPLANA 25 GAGUE, COMBINED Left 12/10/2019    Procedure: PHACOEMULSIFICATION, CATARACT, CLEAR CORNEAL INCISION APPROACH, W STD INTRAOCULAR LENS IMPLANT INSERT + VITRECTOMY BY PARS PLANA  USING 25-GAUGE INSTRUMENTS. ENDOLASER, INFUSION OF 20% SF6 GAS;  Surgeon: Triny Bunch MD;  Location: [facility] OR     PICC INSERTION Right 2020    basilic 44 cm total        MEDICATIONS:  Prior to Admission Medications   Prescriptions Last Dose Informant Patient Reported? Taking?   aspirin 81 MG tablet  Self No No   Sig: Take 1 tablet (81 mg) by mouth daily   atorvastatin (LIPITOR) 40 MG tablet  Self No No   Sig: Take 1 tablet (40 mg) by mouth daily   blood glucose (ACCU-CHEK SMARTVIEW) test strip  Self No No   Sig: USE TO TEST THREE TIMES DAILY AS DIRECTED   blood glucose (NO BRAND SPECIFIED) test strip  Self No No   Sig: Use to test blood sugar 2 times daily or as directed.   blood glucose monitoring (ACCU-CHEK FELIPE SMARTVIEW) meter device kit  Self No No   Sig: Use to test blood sugar 3-4 times daily, as directed.   blood glucose monitoring (ONE TOUCH DELICA) lancets  Self No No   Sig: Use to test blood sugars 2 times daily.   clopidogrel (PLAVIX) 75 MG tablet  Self No No   Sig: Take 1 tablet (75 mg) by mouth daily   exenatide ER (BYDUREON) 2 MG pen  Self No No   Sig: Inject 2 mg Subcutaneous every 7 days   furosemide (LASIX) 20 MG tablet  Self No No   Sig: Take 2 tablets (40 mg) by mouth 2 times daily   glimepiride (AMARYL) 4 MG tablet  Self No No   Sig: Take 1 tablet (4 mg) by mouth every morning (before breakfast)   hydrALAZINE (APRESOLINE) 25 MG tablet  Self No No   Sig: Take 1 tablet (25 mg) by mouth 3 times daily   insulin glargine (LANTUS SOLOSTAR) 100 UNIT/ML pen  Self No No   Sig: Take 8 units in the morning and 40 units in the evening   insulin pen needle (B-D U/F) 31G X 5 MM miscellaneous  Self No No   Si Units by Device route 2 times  daily Use 2 pen needles daily or as directed.   isosorbide mononitrate (IMDUR) 60 MG 24 hr tablet  Self No No   Sig: Take 1 tablet (60 mg) by mouth daily   losartan (COZAAR) 50 MG tablet  Spouse/Significant Other No No   Sig: Take 1 tablet (50 mg) by mouth daily   nitroglycerin (NITROSTAT) 0.4 MG SL tablet  Self No No   Sig: Place 1 tablet (0.4 mg) under the tongue every 5 minutes as needed for chest pain If you are still having symptoms after 3 doses (15 minutes) call 911.   order for DME  Self No No   Sig: Auto CPAP 8-15 cmH20      Facility-Administered Medications Last Administration Doses Remaining   erythromycin (ROMYCIN) ophthalmic ointment None recorded    lidocaine (PF) (XYLOCAINE) 2 % injection 10 mL None recorded 1           ALLERGIES:     Allergies   Allergen Reactions     Heparin Heparin Induced Thrombocytopenia     HIT screen sent 7/18/2020. No heparin products until resulted     No Known Drug Allergies        FAMILY HISTORY:  Family History   Problem Relation Age of Onset     Diabetes Brother      Diabetes Sister      Diabetes Sister      Macular Degeneration No family hx of      Glaucoma No family hx of      Myocardial Infarction No family hx of      Kidney Disease No family hx of        SOCIAL HISTORY:  Social History     Socioeconomic History     Marital status:      Spouse name: Not on file     Number of children: 4     Years of education: Not on file     Highest education level: Not on file   Occupational History     Occupation:      Employer: [employer]     Employer: RETIRED   Social Needs     Financial resource strain: Not on file     Food insecurity     Worry: Not on file     Inability: Not on file     Transportation needs     Medical: Not on file     Non-medical: Not on file   Tobacco Use     Smoking status: Never Smoker     Smokeless tobacco: Never Used     Tobacco comment: Never smoked; non-smoking household   Substance and Sexual Activity     Alcohol use: No      Alcohol/week: 0.0 standard drinks     Drug use: No     Sexual activity: Yes     Partners: Female   Lifestyle     Physical activity     Days per week: Not on file     Minutes per session: Not on file     Stress: Not on file   Relationships     Social connections     Talks on phone: Not on file     Gets together: Not on file     Attends Congregation service: Not on file     Active member of club or organization: Not on file     Attends meetings of clubs or organizations: Not on file     Relationship status: Not on file     Intimate partner violence     Fear of current or ex partner: Not on file     Emotionally abused: Not on file     Physically abused: Not on file     Forced sexual activity: Not on file   Other Topics Concern     Parent/sibling w/ CABG, MI or angioplasty before 65F 55M? No   Social History Narrative     Not on file       LABS:  BMP  Recent Labs   Lab 07/19/20  0441 07/18/20  1646 07/18/20  0342 07/17/20  1522    134 136 136   POTASSIUM 3.8 4.2 4.1 4.6   CHLORIDE 105 101 107 109   CO2 27 27 25 22   BUN 33* 39* 37* 39*   CR 1.52* 1.70* 1.68* 1.61*   * 282* 141* 292*   BRYANNA 8.3* 8.5 8.5 8.0*   PHOS 2.5  --  2.5  --      CBC  Recent Labs   Lab 07/19/20  0441 07/18/20  0342 07/17/20  0350 07/17/20  0001   WBC 7.9 9.7 11.4* 11.3*   HGB 10.6* 10.1* 10.9* 10.5*   HCT 32.6* 31.8* 34.7* 33.6*   MCV 95 97 96 97   PLT 89* 95* 117* 127*     INR  Recent Labs   Lab 07/13/20  0351   INR 1.05     IMAGING:    RHC 6/18/20     RA 20/26/18  RV 85/28  PA 82/45/58  PCWP 45  Cardiac output by Jacy: 3.85 L/min (indexed to 2.09 L/min/m2)  Cardiac output by thermodilution: 3.63 L/min (indexed to 1.97 L/min/m2)  SVR (by TD CO): 1123  PVR (by TD CO): 7.4    Angiogram 6/18/20   3 vessel CAD s/p 3V CABG in 2008 (LIMA-LAD, SVG-OM1, SVG-RPDA)  2. Known proximal SVG-RPDA occlusion  3. Presumed occlusion of SVG-OM1 (unable to locate on non-selective aortic root shot)  4. Patent LIMA-LAD with collateralization of LAD to  PDA    Echocardiogram ( 3/11/2019)   Moderate to severe left ventricular dilation is present.  Severely (EF 10-20%) reduced left ventricular function is present.  No significant valve dysfunction.  Compared to study done 6/5/17 there is no significant change in LV size and  systolic function    Echocardiogram ( 7/5/2020)   Interpretation Summary  Severely (EF 10-20%) reduced left ventricular function is present. LVEF 15%  based on biplane 2D tracing. Severe left ventricular dilation is present.  LVIDd:6.8 cm. Grade III or advanced diastolic dysfunction.  The right ventricle is normal size.  Global right ventricular function is moderately reduced.  No significant valvular abnormalities were noted.  IVC diameter and respiratory changes fall into an intermediate range  suggesting an RA pressure of 8 mmHg.  Mild pulmonary hypertension is present.     This study was compared with the study from 3/25/2019. RV function has  worsened, LV size and function appear similar.    Devices  ICD ([employer]- 4/2/2015 )       I have reviewed today's vital signs, notes, medications, labs and imaging.  I have also seen and examined the patient and agree with the findings and plan as outlined above.  Pt without complaints.  VSS with T 98.8, HR 78, RR 16 and /40 with subclavian IABP.  Lungs clear and mechanical IABP noted. Labs with Cr 1.5 and WBC 7.9 with Plt 89K--HIT positive.  Assessment: pt with endstage heart failure supported by IABP and currently UNOS Status II awaiting OHT.  Will continue to treat UTI with ceftriaxone.  Will consult hematology, and transfusion medicine for HIT positive.  Pt with possible donor heart available will give simulect protocol.  Total critical care time 45 min.     James Terrazas MD, PhD  Professor, Heart Failure and Cardiac Transplantation  [facility]

## 2020-07-19 NOTE — PROGRESS NOTES
Brief updates    Notified HIT positive. Spoke to Bre, hematology labs who will send CORRINE, result will be available as soon as Monday.  - HD line placed  - Hematology consult for management of HIT, and choice of anticoagulant  - Transfusion medicine consult for PLEX    Elías Wong  PGY-3 IM  Pager [phone]

## 2020-07-19 NOTE — DISCHARGE INSTRUCTIONS
[facility] Post Instructions  You may feel tired after your procedure today.   Please call your doctor if you have:  bleeding that doesn t stop, fever, pain where a needle or tube (catheter) was placed, seizures, trouble breathing, red urine, nausea or vomiting, other health concerns.     If your symptoms are severe, call [phone].  You have a temporary left internal jugular Central Venous Catheter:  Notify your doctor if you have had a fever, chills, shaking  or redness, warmth, swelling, drainage at the exit-site.  This could be a sign of infection.    The [facility] is open Monday-Friday 7:30 a.m. to 5 p.m.  The phone number is [phone].  A Transfusion Medicine physician can be reached after 5:00 p.m. weekdays and on weekends /Holidays by calling [phone], and asking for the physician on call.      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  Plasma exchange:  Sunday, 7/19/2020, you received blood products (plasma) as part of your treatment, you need to be aware that transfusion reactions can occur up to several hours after they have been given to you.  Call your physician if you experience any symptoms in the next 48 hours, including: breathing problems, rash, itching, hives, nausea or vomiting, fever or chills, blood in your urine or stools, or joint pain.  Please inform the Transfusion Medicine Physician by calling [phone] and asking for the physician on call.  Certain medications that lower your blood pressure (ace inhibitors) such as Lisinopril are contraindicated while you are receiving plasma exchange.  Please inform us if you have started taking this medication during your plasma exchange series.    Nutrition  Diet recommendations post-transplant: High protein diet x 8 weeks.  Heart healthy dietary habits long term (low saturated/trans fat, low sodium). Practice food safety  precautions. See points below (nutrition handouts and food safety booklet for more information).     -  Maintain a healthy weight.  -  Eat a heart-healthy diet (low sodium, low saturated and trans-fat) once your appetite improves to normal.  -  Control blood sugar.  -  Limit sodium (salt).  -  Take calcium and vitamin D supplement if your doctor or team orders.  -  Eat more protein for six to eight weeks after transplant.    -  Prevent food poisoning, store and prepare foods to the proper temperature, practice good handwashing, heat all deli meat (to 165 degrees Fahrenheit), avoid raw fish and meats (including uncooked eggs, non-pasteurized or raw milk, and non-pasteurized or raw cheeses including brie, camembert, blue-veined cheese, and Mexican queso fresco), and throw out leftovers older than two days.   -  In some cases (but not in all cases), adjust potassium intake.     Academy of Nutrition and Dietetics. This handout may be duplicated for client education. Type 2 Diabetes Nutrition Therapy (Chinese)    Terapia nutricional para la diabetes tipo 2    Por qué es importante contar carbohidratos?     Contar las porciones de carbohidratos puede ayudarle a controlar la glucosa (azúcar) en la sparkle para que se sienta mejor.     El equilibrio entre los carbohidratos que come y la insulina determina el nivel de glucosa que tendrá en la sparkle después de comer.     El conteo de carbohidratos también puede ayudarle a planificar kely comidas.      Qué alimentos contienen carbohidratos?   Entre los alimentos con carbohidratos se incluyen:     Panes, galletas saladas y cereales     Pastas, arroz y granos     Vegetales (verduras) con almidón, halie sandra, elote (maíz o choclo) y chícharos (guisantes o arvejas)     Frijoles (habichuelas) y legumbres     Leche, leche de soya y yogur     Frutas y jugos de frutas     Dulces halie pasteles, galletas, helados, mermeladas y jaleas     Porciones de carbohidratos   Al planificar comidas  para la diabetes, un alimento con 1 porción de carbohidratos contiene cerca de 15 gramos de carbohidratos:     Verifique el tamaño de las porciones con tazas y cucharas de medir o con shen pesa de alimentos.     [place] los Datos de Nutrición en las etiquetas de los alimentos para saber cuántos gramos de carbohidratos contienen los alimentos que come.    Consejos para planificar kely comidas   Un Plan de Alimentación le indica cuántas porciones de carbohidratos consumir en kely comidas y refrigerios (snacks). Para muchos adultos es adecuado comer 3 a 5 porciones de carbohidratos en cada comida y 1 a 2 porciones de carbohidratos en cada refrigerio.     En un Plan de Alimentación diaria saludable, la mayoría de los carbohidratos provienen de:   o Al menos 6 porciones de frutas y vegetales sin almidón   o Al menos 6 porciones de granos, frijoles y vegetales con almidón, de las cuales al menos 3 porciones deben ser granos integrales   o Al menos 2 porciones de leche o productos lácteos     Revise regularmente lainez nivel de glucosa en la sparkle. [place] puede indicarle si necesita ajustar las horas a las que consume carbohidratos.     [place] alimentos que contienen fibra, halie el nandini integral, y comer muy pocos alimentos salados es rogers para lainez terri.     Coma 4 a 6 onzas de carne u otros alimentos con proteínas (halie hamburguesas de soya) cada día. Elija franco de proteínas bajas en grasa, tales halie brandi magras de res y de cerdo, angela, pescado, queso bajo en grasa o alimentos vegetarianos halie la soya.     Coma algunas grasas saludables, tales halie aceite de larsen, de canola y nueces.     Coma muy pocas grasas saturadas. Estas grasas no son saludables y se encuentran en la mantequilla, la crema y en las brandi de alto contenido graso, tales halie el tocino (tocineta) y las salchichas o chorizos.     Coma muy pocas o nada de grasas trans. Estas grasas no son saludables y se encuentran en todos los alimentos que contienen  aceites  parcialmente hidrogenados  en lainez lista de ingredientes.   Consejos para leer etiquetas   En los Datos de Nutrición de las etiquetas aparece shen lista con el total de gramos de carbohidratos en 1 porción estándar. La porción estándar puede ser mayor o cinthya que 1 porción de carbohidratos. Para saber cuántas porciones de carbohidratos hay en un alimento:     Omaira isaiah el tamaño de la porción estándar de la etiqueta.     Luego verifique el total de gramos de carbohidratos. Esta es la cantidad de carbohidratos en shen porción estándar.     Divida el total de gramos de carbohidratos por 15. Saray número equivale al número de porciones de carbohidratos en 1 porción estándar. Recuerde: 1 porción de carbohidratos equivale a 15 gramos de carbohidratos.     Nota: Puede ignorar los gramos de azúcar en los Datos de Nutrición, ya que están incluidos en el total de gramos de carbohidratos.   Listas de alimentos para el conteo de carbohidratos   1 porción = cerca de 15 gramos de carbohidratos   Granos     1 rebanada de pan (1 onza)     1 tortilla (6 pulgadas)       rosca de pan (bagel) vicky (1 onza)     2 tortillas para taco (5 pulgadas)       pan para hamburguesa o para salchicha (hot dog) (  onza)       taza de cereal listo para comer, sin endulzar       taza de cereal cocido     1 taza de sopa a base de caldo     4-6 galletitas saladas     ? taza de pasta o arroz (cocidos)       taza de frijoles, chícharos, granos de elote, camotes (batatas, boniatos), calabaza (zapallo), puré de sandra o sandra hervidas (cocidos)       papa vicky asada (3 onzas)       onza de pretzels, papitas o totopos (tortilla chips)     3 tazas de palomitas de maíz (popcorn) (ya preparadas)     Frutas     1 fruta fresca pequeña (  a 1 taza)       taza de fruta enlatada o congelada     17 uvas pequeñas (3 onzas)     1 taza de melón, bayas (moras)       taza de jugo de fruta sin endulzar     2 cucharadas de frutas secas (arándanos  azules/blueberries, cerezas, arándanos rojos/ cranberrries, frutas surtidas, uvas pasas/pasitas)     Leche     1 taza de leche descremada o reducida en grasa     1 taza de leche de soya     ? taza (6 onzas) de yogur descremado con edulcorante sin azúcar     Dulces y postres     pastel tato de 2 pulgadas (sin betún/cobertura)     2 galletitas dulces (? onza)       taza de helado o yogur congelado       taza de sorbete (sherbet) o sandor (sorbet)     1 cucharada de jarabe (sirope), mermelada, jalea, azúcar o miel     2 cucharadas de jarabe bajo en calorías   Copyright Academy of Nutrition and Dietetics. This handout may be duplicated for client education. Type 2 Diabetes Nutrition Therapy (Canadian) - Page 4     Otros alimentos     Cuente 1 taza de vegetales crudos o   taza de vegetales cocidos, sin almidón, halie porciones de alimentos con cero (0) carbohidratos o  sin restricción.  Si come 3 o más porciones en shen comida, cuéntelas halie 1 porción de carbohidratos.     Los alimentos que contienen menos de 20 calorías en cada porción también pueden contarse halie porciones con cero carbohidratos o alimentos  sin restricción.      Cuente 1 taza de guiso (estofado) u otros alimentos mezclados halie 2 porciones de carbohidratos.     Diabetes tipo 2: Ejemplo de menú para 1 día Desayuno    toronja/pomelo (1 porción de carbohidratos)     taza de cereal de salvado (1 porción de carbohidratos)   1 taza de leche descremada (1 porción de carbohidratos)   1 tostada de pan de nandini integral (1 porción de carbohidratos)   1 cucharadita de margarina    Almuerzo  2 onzas de pechuga de pavo   2 rebanadas de pan de lyons (2 porciones de carbohidratos)   1 cucharadita de margarina   1 taza de sopa a base de caldo (1 porción de carbohidratos)   Ensalada de kyle y tomate   1 cucharada de aderezo para ensaladas reducido en calorías (light)   6 onzas de yogur artificialmente endulzado (1 porción de carbohidratos)   1 lay de refresco  de dieta    Maxx  3 onzas de angela asado   3 onzas de sandra (1 porción de carbohidratos)     taza de ejotes/habichuelas tiernas/chauchas   2 cucharaditas de margarina   Ensalada mixta   1 cucharada de aderezo para ensaladas reducido en calorías (light )   1 taza de leche descremada (1 porción de carbohidratos)   1 manzana pequeña (1 porción de carbohidratos)    Refrigerio (snack) nocturno  1 cucharada de nueces     taza de helado (1 porción de carbohidratos)   1  taza de fresas/frutillas (1 porción de carbohidratos)        DIABETES  Nikki lizandro vurtual (video telefono) con Marisabel Prieto va estar fin de kerry semana o temprano proxima semana.  Llame a la clinica si tiene preguntas, [phone]   Plan for Home  -Take Humulin N 35 units in the morning with prednisone  -Take Amaryl (glimpiride) 6 mg every morning with breakfast  -Take Januvia (sitagliptin) 100 mg every day  Eat about 60 grams carbohydrate per meal (or about 4 portions of carbohydrate)  Check glucose before meals and if having symptoms of low or high glucose  Plan diabetes para el hogar  -[place] Humulin N 35 unidades por la mañana con prednisona.  -[place] Amaryl (glimpiride) 6 mg cada mañana con el desayuno.  -[place] Januvia (sitagliptin) 100 mg todos los días.  Controle la glucosa antes de las comidas, or siente algo diferente  comer 4 porciones de carbohidratos en cada comida -- or 60 gramos de carbohydrato    Para controlar niveles de glucosa altan, usa Novolog (alonzo mena).  No usa si nivel de glucosa es menos de 140 antes de comer  BG = nivel de glucosa   -169 give 2 units.    -199 give 4 units.    -229 give 6 units.    -259 give 8 units.    -289 give 10 units.    -319 give 12 units.    -349 give 14 units.    BG >/= 350 give 16 units.              https://www.nutritioncaremanual.org/sso.cfm?c=[email or web address]              [facility]      AFTER YOU GO HOME FROM YOUR HEART SURGERY    You had a  sternotomy, avoid lifting anything greater than ten pounds for 6 weeks after surgery and then less than 20 pounds for an additional 6 weeks. Do not reach backwards or use arms to push out of chair. Do not let people pull on your arms to assist with standing. Avoid twisting or reaching too far across your body.  Avoid strenuous activities such as bowling, vacuuming, raking, shoveling, golf or tennis for 12 weeks after your surgery. It is okay to resume sex if you feel comfortable in doing so. You may have to try different positions with your partner.  Splint your chest incision by hugging a pillow or bringing your arms across your chest when coughing or sneezing.     No driving for 4 weeks after surgery or while on pain medication.    Shower or wash your incisions daily with soap and water (or as instructed), pat dry. Keep wound clean and dry, showers are okay after discharge, but don't let spray hit directly on incision. No baths or swimming for 1 month. Cover chest tube sites with gauze until they stop draining, then leave open to air. It is not abnormal for chest tube sites to drain yellowish/clear fluid for up to 2-3 weeks after surgery.   Watch for signs of infection: increased redness, tenderness, warmth or any drainage that appears infected (pus like) or is persistent.  Also a temperature > 100.5 F or chills. Call your surgeon or primary care provider's office immediately. Remove any skin glue left on incisions after 10-14 days. This will not affect your incision and can speed up healing.    Exercise is very important in your recovery. Please follow the guidelines set up for you in your cardiac rehab classes at the hospital. If outpatient cardiac rehab was ordered for you, we highly recommend you participate. If you have problems arranging your cardiac rehab, please call [phone] for all locations, with the exception of [place], please call [phone] and [place], please call [phone].    Avoid  "sitting for prolonged periods of time, try to walk every hour during the day. If you have a leg incision, elevate your leg often when you are not walking.    Check your weight when you get home from the hospital and continue to check it daily through your recovery for at least a month. If you notice a weight gain of 2-3 pounds in a week, notify your primary care physician, cardiologist or surgeon.    Bowel activity may be slow after surgery. If necessary, you may take an over the counter laxative such as Milk of Magnesia or Miralax. You may have stool softeners prescribed (docusate sodium, Senokot). We recommend using stool softeners while using narcotics for pain (oxycodone/percocet, hydrocodone/vicodin, hydromorphone/dilaudid).      Wean OFF of narcotics (oxycodone, dilaudid, hydrocodone) as soon as possible. You should continue taking acetaminophen as long as you have any surgical pain as the first choice for pain control and add narcotics as necessary for pain to be tolerable.      DENTAL VISITS AFTER SURGERY  If you have had your heart valve repaired or replaced, we do not recommend having any dental work done for 6 months and you will need to take an antibiotic prior to dental visits from now on.  Please notify your dentist before any procedure for the proper treatment needed. The antibiotic is taken by mouth one hour prior to visit. This includes routine cleanings.  You can sometimes hear a mechanical valve \"clicking,\" this is normal and not a sign of something wrong.    DO NOT SMOKE.  IF YOU NEED HELP QUITTING, PLEASE TALK WITH YOUR CARDIOLOGIST OR PRIMARY DOCTOR.    You are on a blood thinner, follow the instructions you were given in the hospital and DO NOT SKIP this medication unless instructed. Hold your fonduparinux injection on morning of biopies    You had a heart transplant, so call your Transplant Coordinator with all questions or concerns.     REGARDING PRESCRIPTION REFILLS.  If you need a refill " on your pain medication contact us to discuss your pain and a possible one time refill.   All other medications will be adjusted, discontinued and re-filled by your primary care physician and/or your cardiologist as they were prior to your surgery. We have given you enough for one to three month with possibly one refill.    POST-OPERATIVE CLINIC VISITS  Your follow-up appointments will be arranged by your transplant coordinator.   If there is a need to return to see CT Surgery please call our  at [phone].    SURGICAL QUESTIONS  Please call Corina Motley with surgical recovery and medication questions, the phone number is listed below.  She can assist you with your needs and contact other surgery care team members as indicated.    On weekends or after hours, please call [phone] and ask the  to   page the Cardiothoracic Surgery fellow on call.      Thank you,    Your Cardiothoracic Surgery Team  Corina Motley RN Care Coordinator-  [phone]   Claudia Gates NP

## 2020-07-19 NOTE — PLAN OF CARE
Major Shift events: RIJ venous sheath discontinued. CVP 11. Good UOP. Arterial BP and IABP diastolic pressures in 20s-30s. Pt stated pain in R arm improving, no numbness or tingling, good pulses.

## 2020-07-19 NOTE — ANESTHESIA PREPROCEDURE EVALUATION
Anesthesia Pre-Procedure Evaluation    Patient: Lucian Henderson Sr.   MRN:     [id number] Gender:   male   Age:    64 year old :      1955        Preoperative Diagnosis: * No pre-op diagnosis entered *   Procedure(s):  TRANSPLANT, HEART, RECIPIENT     LABS:  CBC:   Lab Results   Component Value Date    WBC 7.9 2020    WBC 9.7 2020    HGB 10.6 (L) 2020    HGB 10.1 (L) 2020    HCT 32.6 (L) 2020    HCT 31.8 (L) 2020    PLT 89 (L) 2020    PLT 95 (L) 2020     BMP:   Lab Results   Component Value Date     2020     2020    POTASSIUM 3.8 2020    POTASSIUM 4.2 2020    CHLORIDE 105 2020    CHLORIDE 101 2020    CO2 27 2020    CO2 27 2020    BUN 33 (H) 2020    BUN 39 (H) 2020    CR 1.52 (H) 2020    CR 1.70 (H) 2020     (H) 2020     (H) 2020     COAGS:   Lab Results   Component Value Date    PTT 35 2020    INR 1.18 (H) 2020    FIBR 535 (H) 2020     POC:   Lab Results   Component Value Date     (H) 2020     OTHER:   Lab Results   Component Value Date    LACT 1.0 2020    A1C 8.2 (H) 2020    BRYANNA 8.3 (L) 2020    PHOS 2.5 2020    MAG 1.8 2020    ALBUMIN 3.0 (L) 2020    PROTTOTAL 7.1 2020    ALT 24 2020    AST 26 2020    ALKPHOS 117 2020    BILITOTAL 1.0 2020    TSH 1.30 2020    CRP 9.9 2019        Preop Vitals    BP Readings from Last 3 Encounters:   20 (!) 153/50   20 107/75   12/10/19 107/70    Pulse Readings from Last 3 Encounters:   20 80   20 96   12/10/19 96      Resp Readings from Last 3 Encounters:   20 18   20 18   12/10/19 16    SpO2 Readings from Last 3 Encounters:   20 96%   20 96%   12/10/19 96%      Temp Readings from Last 1 Encounters:   20 36.7  C (98  F) (Oral)    Ht Readings from Last  "1 Encounters:   07/03/20 1.626 m (5' 4\")      Wt Readings from Last 1 Encounters:   07/19/20 75.4 kg (166 lb 3.2 oz)    Estimated body mass index is 28.53 kg/m  as calculated from the following:    Height as of this encounter: 1.626 m (5' 4\").    Weight as of this encounter: 75.4 kg (166 lb 3.2 oz).     LDA:  Peripheral IV 07/03/20 Right;Anterior Upper forearm (Active)   Site Assessment WD 07/19/20 1200   Line Status Saline locked 07/19/20 1200   Phlebitis Scale 0-->no symptoms 07/19/20 1200   Infiltration Scale 0 07/19/20 1200   Infiltration Site Treatment Method  None 07/15/20 1600   Extravasation? No 07/19/20 1200   Number of days: 16       PICC Double Lumen 07/11/20 Right Basilic (Active)   Site Assessment [facility] 07/19/20 1200   External Cath Length (cm) 1 cm 07/11/20 1700   Extremity Circumference (cm) 31 cm 07/11/20 1700   Dressing Intervention Chlorhexidine patch;Transparent 07/19/20 1200   Dressing Change Due 07/26/20 07/19/20 0800   PICC Comment CDI 07/19/20 1200   Lumen A - Color PURPLE 07/19/20 1200   Lumen A - Status transduced 07/19/20 1200   Lumen A - Cap Change Due 07/21/20 07/19/20 0800   Lumen B - Color WHITE 07/19/20 1200   Lumen B - Status saline locked 07/19/20 1200   Lumen B - Cap Change Due 07/21/20 07/19/20 0800   Extravasation? No 07/19/20 1200   Line Necessity Yes, meets criteria 07/19/20 1200   Number of days: 8       Arterial Line 07/16/20 Radial (Active)   Site Assessment WD 07/19/20 1200   Line Status Pulsatile blood flow 07/19/20 1200   Arterine Line Cap Change Due 07/21/20 07/19/20 0800   Art Line Waveform Appropriate 07/19/20 1200   Art Line Interventions Leveled;Connections checked and tightened;Flushed per protocol 07/19/20 1200   Color/Movement/Sensation Capillary refill less than 3 sec 07/19/20 1200   Line Necessity Yes, meets criteria 07/19/20 1200   Dressing Type Transparent 07/19/20 1200   Dressing Status Clean, dry, intact 07/19/20 1200   Dressing Intervention Dressing " changed/new dressing 07/19/20 0400   Dressing Change Due 07/26/20 07/19/20 1200   Number of days: 3       CVC Double Lumen 07/19/20 (Active)   Number of days: 0       Arterial Sheath  (Active)   Specific Qualities Sutured 07/19/20 1200   Site Assessment WDL 07/19/20 1200   Dressing Type Transparent 07/19/20 1200   Dressing Intervention Dressing reinforced 07/16/20 2000   Arterial Sheath Comment right subclavian IABP 07/19/20 1200   Number of days: 3       ETT (Active)   Number of days: 0        Past Medical History:   Diagnosis Date     CAD (coronary artery disease)      CHF (congestive heart failure) (H)      CKD (chronic kidney disease), stage III (H)      Cortical cataract of both eyes      Diabetes (H)      Hyperlipidemia      Hypertension      Ischemic cardiomyopathy      Obesity      CHANTEL (obstructive sleep apnea)     occas cpap     Osteoarthritis       Past Surgical History:   Procedure Laterality Date     C CABG, ARTERY-VEIN, THREE  02/2008     CATARACT IOL, RT/LT       COLONOSCOPY N/A 8/7/2019    Procedure: COLONOSCOPY, WITH POLYPECTOMY AND BIOPSY;  Surgeon: Chauncey Morataya MD;  Location:  GI     CV RIGHT HEART CATH N/A 3/25/2019    Procedure: CV RIGHT HEART CATH;  Surgeon: Moises Santos MD;  Location:  HEART CARDIAC CATH LAB     CV RIGHT HEART CATH N/A 7/10/2019    Procedure: CV RIGHT HEART CATH;  Surgeon: Jak Mccabe MD;  Location:  HEART CARDIAC CATH LAB     CV RIGHT HEART CATH N/A 7/8/2020    Procedure: Right Heart Cath with Leave In [place] already has ICU load;  Surgeon: Jak Mccabe MD;  Location:  HEART CARDIAC CATH LAB     EXTRACTION(S) DENTAL Left 7/13/2020    Procedure: EXTRACTION, TOOTH #11, 12, 13, 15, and 29;  Surgeon: Monica Chao DDS;  Location:  OR     IMPLANT AUTOMATIC IMPLANTABLE CARDIOVERTER DEFIBRILLATOR       INSERT INTRAAORTIC BALLOON PUMP N/A 7/16/2020    Procedure: Subclavian Intra-Aortic Balloon Pump Placement;  Surgeon: Allan Sparrow MD;   Location: [facility] OR     PHACOEMULSIFICATION CLEAR CORNEA WITH STANDARD IOL, VITRECTOMY PARSPLANA 25 GAGUE, COMBINED Left 12/10/2019    Procedure: PHACOEMULSIFICATION, CATARACT, CLEAR CORNEAL INCISION APPROACH, W STD INTRAOCULAR LENS IMPLANT INSERT + VITRECTOMY BY PARS PLANA  USING 25-GAUGE INSTRUMENTS. ENDOLASER, INFUSION OF 20% SF6 GAS;  Surgeon: Triny Bunch MD;  Location: [facility] OR     PICC INSERTION Right 07/11/2020    basilic 44 cm total       Allergies   Allergen Reactions     Heparin Heparin Induced Thrombocytopenia     HIT screen sent 7/18/2020. No heparin products until resulted     No Known Drug Allergies         Anesthesia Evaluation     . Pt has had prior anesthetic. Type: General (Grade View of Cords: 3 (Bougie utilized to pass ETT))    No history of anesthetic complications          ROS/MED HX    ENT/Pulmonary:     (+)sleep apnea, , . .    Neurologic:  - neg neurologic ROS     Cardiovascular: Comment: 7/15 intraarotic balloon pump placed, awaiting transplant     (+) hypertension--CAD (s/p 3vCABG 2008), -CABG-date: 2008, . : . CHF (s/p intra-aortic balloon pump) etiology: ischemic cardiomyopathy Last EF: 10-20% . . :ICD Reason placed:primary prevention  type;[employer]  Settings VVI  . dysrhythmias a-flutter, . Previous cardiac testing Echodate:7/2020results:Severely (EF 10-20%) reduced left ventricular function is present. LVEF 15% based on biplane 2D tracing. Severe left ventricular dilation is present.  LVIDd:6.8 cm. Grade III or advanced diastolic dysfunction.  The right ventricle is normal size.  Global right ventricular function is moderately reduced.  No significant valvular abnormalities were noted.  IVC diameter and respiratory changes fall into an intermediate range suggesting an RA pressure of 8 mmHg.  Mild pulmonary hypertension is present.date: results:ECG reviewed date:7/8/2020 results:Sinus rhythm  Left axis deviation  Non-specific intra-ventricular conduction block  Abnormal  ECGCath date: 7/2020 results:Right sided filling pressures are mildly elevated.Left sided filling pressures are severely elevated. Severely elevated pulmonary artery hypertension.Left ventricular filling pressures are severely elevated .Reduced cardiac output level.          METS/Exercise Tolerance:     Hematologic: Comments: HIT positive, getting PLEX prior and during transplant surgery        Musculoskeletal:  - neg musculoskeletal ROS       GI/Hepatic:  - neg GI/hepatic ROS       Renal/Genitourinary:     (+) chronic renal disease,       Endo:     (+) type II DM Last HgA1c: 8.3 Obesity, .      Psychiatric:  - neg psychiatric ROS       Infectious Disease: Comment: COVID neg 7/7/2020          Malignancy:      - no malignancy   Other:    - neg other ROS                     PHYSICAL EXAM:   Mental Status/Neuro:    Airway: Facies: Feasible  Mallampati: II  Mouth/Opening: Full  TM distance: > 6 cm  Neck ROM: Full   Respiratory: Auscultation: CTAB     Resp. Rate: Normal      CV: Rhythm: Regular  Heart: Normal Sounds   Comments:      Dental: Details                  Assessment:   ASA SCORE: 4    H&P: History and physical reviewed and following examination; no interval change.   Smoking Status:  Non-Smoker/Unknown        Plan:   Anes. Type:  General   Pre-Medication: None   Induction:  IV (Standard)   Airway: ETT; Oral   Access/Monitoring: PIV; A-Line; Central Access/Port present   Maintenance: Balanced     Drips/Meds: Epinephrine; Norepi; Dopamine; Milrinone (isoproterenol)     Advanced Monitoring: ADARSH Adult            ADULT ADARSH Checklist:               Absolute Contra-Indications: NONE               Relative Contra-Indications:  Thrombocytopenia               Final Plan: Consider GI-Consult; Avoid Advancement further than Mid-Esophagus; Place Smallest Possible Probe under CMAC Guidance     Postop Plan:   Postop Pain: Opioids  Postop Sedation/Airway: Not planned  Disposition: ICU     PONV Management:   Adult Risk  Factors:, Non-Smoker   Prevention:, Propofol     CONSENT: Direct conversation; Via  ( ID# [id number])   Plan and risks discussed with: Patient; Spouse   Blood Products: Consented (ALL Blood Products)       Comments for Plan/Consent:  64 year old male, ASA 4, end stage heart failure s/p 7/15/2020 intra-aortic balloon pump now presenting for heart transplant.  The patient has developed HIT antibioties in the interm and heme/onc started the patient on plasma exchange today which will continue until surgery and then an additional round of plasma exchange after giving heparin in the OR.  - ICD tachytherapies turned off                   Aleksandar West III, MD

## 2020-07-19 NOTE — PLAN OF CARE
Major Shift Events:  Stopped heparin @ 1100 d/t platelets decreasing, checking HIT labs at 2000. Walked with PT. Vital signs WDL. R subclavian IABP 1:1 100%, diastolics low, 0-20 while up in chair, MD aware. RA during day, 2L NC while sleeping. CVP 11 at 0800. Gave senna at 1600, no BM still.    Plan: Continue with plan of care, update MD with changes.  For vital signs and complete assessments, please see documentation flowsheets.     Problem: Adult Inpatient Plan of Care  Goal: Plan of Care Review  7/18/2020 1908 by Rhonda Leblanc RN  Outcome: No Change  7/18/2020 0648 by Ish Galvez RN  Outcome: No Change  Goal: Patient-Specific Goal (Individualization)  7/18/2020 1908 by Rhonda Leblanc RN  Outcome: No Change  7/18/2020 0648 by Ish Galvez RN  Outcome: No Change  Goal: Absence of Hospital-Acquired Illness or Injury  7/18/2020 1908 by Rhonda Leblanc RN  Outcome: No Change  7/18/2020 0648 by Ish Galvez RN  Outcome: No Change  Goal: Optimal Comfort and Wellbeing  7/18/2020 1908 by Rhonda Leblanc RN  Outcome: No Change  7/18/2020 0648 by Ish Galvez RN  Outcome: Improving  Goal: Readiness for Transition of Care  7/18/2020 1908 by Rhonda Leblanc RN  Outcome: No Change  7/18/2020 0648 by Ish Galvez RN  Outcome: No Change  Goal: Rounds/Family Conference  7/18/2020 1908 by Rhonda Leblanc RN  Outcome: No Change  7/18/2020 0648 by Ish Galvez RN  Outcome: No Change     Problem: Adjustment to Illness (Heart Failure)  Goal: Optimal Coping  7/18/2020 1908 by Rhonda Leblanc RN  Outcome: No Change  7/18/2020 0648 by Ish Galvez RN  Outcome: No Change     Problem: Cardiac Output Decreased (Heart Failure)  Goal: Optimal Cardiac Output  7/18/2020 1908 by Rhonda Leblanc RN  Outcome: No Change  7/18/2020 0648 by Ish Galvez RN  Outcome: No Change     Problem: Fluid Imbalance (Heart Failure)  Goal: Fluid Balance  7/18/2020 1908 by Rhonda Leblanc RN  Outcome: No Change  7/18/2020 0648 by Ish Galvez,  RN  Outcome: No Change     Problem: Functional Ability Impaired (Heart Failure)  Goal: Optimal Functional Ability  7/18/2020 1908 by Rhonda Leblanc RN  Outcome: No Change  7/18/2020 0648 by Ish Galvez RN  Outcome: No Change     Problem: Sleep Disordered Breathing (Heart Failure)  Goal: Effective Breathing Pattern During Sleep  7/18/2020 1908 by Rhonda Leblanc RN  Outcome: No Change  7/18/2020 0648 by Ish Galvez, RN  Outcome: No Change     Problem: Diabetes Comorbidity  Goal: Blood Glucose Level Within Desired Range  7/18/2020 1908 by Rhonda Leblanc RN  Outcome: No Change  7/18/2020 0648 by Ish Galvez, RN  Outcome: Improving

## 2020-07-19 NOTE — PLAN OF CARE
4E PT: Cancel, pt unavailable for PT upon attempt this date, unable to return later in day. Will reschedule per PT POC.

## 2020-07-19 NOTE — TELEPHONE ENCOUNTER
HEART donor was identified by [facility] and reviewed with Dr. Santos.  The organ was accepted. Pt. Is in-house on 4E and was advised     Donor UNOS ID [id number] and Match ID [id number] confirmed with Dr. Santos.      Donor and recipient blood type reviewed and found to be IDENTICAL      Recipient with HLA antibodies: NO  Crossmatch required   Prospective: no   Virtual: Completed  Crossmatch reviewed with Dr. Stephens, immunology staff on call and deemed negative based on organ specific protocol.     Donor specific antibodies present, notified Dr. Santos.    Pt was contacted ON 4E.    Verified pt has not had any blood transfusions since their last PRA sample on 07.08.2020.     Pt Instructed to remain NPO for pre-op prep.  Instructed to bring medications, oxygen, or equipement.    Pt on Coumadin: NO  On Heparin   Intervention: Ordered STAT INR    HEART RECIPIENT: Renal function reviewed, pt IS pre-selected for CNI delaying protocol, Dr. Santos notified. 07.19.2020(Cr:1.54)]      ABO/CMV/EBV status note:   UNOS donor ID [id number]  Donor blood type is O: Verified by donor records   Recipient blood type is O : verified by blood bank [facility].   Donor CMV status is positive. Verified by donor records.   Recipient CMV status is positive. Verified in [facility] lab results.   Donor EBV status is positive. Verified by donor records.   Recipient EBV status is positive. Verified in [facility] lab results.   Recipient HSV status is positive. verified in [facility] lab results.   Donor is Toxoplasma pos. Verified in UNOS  Recipient is Toxoplasma negative. Verified in [facility] lab results

## 2020-07-19 NOTE — PLAN OF CARE
Major Shift Events:  HIT positive, HD line placed, plex scheduled. NPO sine 1000, heart transplant scheduled for 2000. Sent down labs, bath given. BG in 400s at 1200, MD notified.   Plan: Will give pre op meds after plex, continue with plan of care, update MD with changes.  For vital signs and complete assessments, please see documentation flowsheets.       Problem: Adult Inpatient Plan of Care  Goal: Plan of Care Review  7/19/2020 1842 by Rhonda Leblanc RN  Outcome: No Change  7/19/2020 0511 by Ish Galvez RN  Outcome: No Change  Goal: Patient-Specific Goal (Individualization)  7/19/2020 1842 by Rhonda Leblanc RN  Outcome: No Change  7/19/2020 0511 by Ish Galvez RN  Outcome: No Change  Goal: Absence of Hospital-Acquired Illness or Injury  7/19/2020 1842 by Rhonda Leblanc RN  Outcome: No Change  7/19/2020 0511 by Ish Galvez RN  Outcome: No Change  Goal: Optimal Comfort and Wellbeing  7/19/2020 1842 by Rhonda Leblanc RN  Outcome: No Change  7/19/2020 0511 by Ish Galvez RN  Outcome: No Change  Goal: Readiness for Transition of Care  7/19/2020 1842 by Rhonda Leblanc RN  Outcome: No Change  7/19/2020 0511 by Ish Galvez RN  Outcome: No Change  Goal: Rounds/Family Conference  7/19/2020 1842 by Rhonda Leblanc RN  Outcome: No Change  7/19/2020 0511 by Ish Galvez RN  Outcome: No Change     Problem: Adjustment to Illness (Heart Failure)  Goal: Optimal Coping  7/19/2020 1842 by Rhonda Leblanc RN  Outcome: No Change  7/19/2020 0511 by Ish Galvez RN  Outcome: No Change     Problem: Cardiac Output Decreased (Heart Failure)  Goal: Optimal Cardiac Output  7/19/2020 1842 by Rhonda Leblanc RN  Outcome: No Change  7/19/2020 0511 by Ish Galvez RN  Outcome: No Change     Problem: Fluid Imbalance (Heart Failure)  Goal: Fluid Balance  7/19/2020 1842 by Rhonda Leblanc RN  Outcome: No Change  7/19/2020 0511 by Ish Galvez RN  Outcome: No Change     Problem: Functional Ability Impaired (Heart Failure)  Goal:  Optimal Functional Ability  7/19/2020 1842 by Rhonda Leblanc RN  Outcome: No Change  7/19/2020 0511 by Ish Galvez RN  Outcome: No Change     Problem: Sleep Disordered Breathing (Heart Failure)  Goal: Effective Breathing Pattern During Sleep  7/19/2020 1842 by Rhonda Leblanc, [facility]  Outcome: No Change  7/19/2020 0511 by Ish Galvez RN  Outcome: No Change     Problem: Diabetes Comorbidity  Goal: Blood Glucose Level Within Desired Range  7/19/2020 1842 by Rhonda Leblanc RN  Outcome: No Change  7/19/2020 0511 by Ish Galvez RN  Outcome: No Change

## 2020-07-19 NOTE — CONSULTS
Laboratory Medicine and Pathology  Transfusion Medicine - Apheresis Consult    Lucian Henderson Sr. 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 is a 64 year old male with a PMH of HTN, DMII, obesity, CKD, CHANTEL, ischemic cardiomyopathy and severe LV systolic dysfunction s/p 3V CABG (2008) and primary prevention ICD (4/2/15). He presented with 3 days of worsening fatigue and SOB with minimal exertion.  He was admitted for possible LVAD/transplant work up.  A heart is now available, and the plan is to transplant the patient this evening.    Unfortunately, the patient's platelet count has drifted down from 180s-low 200s over the past few days. Platelet count was 117 (7/17/20), 95 (7/18/20), now 89 (7/19/20).  Drop was concerning for HIT, as per MAR heparin was started on 7/10/20 (plt count 185) and discontinued 7/18/20.  Sample for HIT screen drawn on 7/18/20.  Positive result was relayed today (2.9 U/mL), and Cardiology requested TPE #1 pre-heart transplant and intra-operatively tonight (estimate 11-1130pm start for TPE #2) to lower HIT-related antibody.      I met with the patient's wife and with  [facility] and discussed the potential risks and benefits of the TPE procedure, including use of plasma as the exchange fluid.  I answered all of her questions to the best of my ability.  The patient's wife indicated an understanding of the procedure and the plan and agreed to proceed, signing the consent forms (procedure and blood use).  We will proceed shortly with TPE #1 and will plan for TPE #2 intra-operatively following dosing of the heparin in the OR.       Please do not start or continue ACE-inhibitors throughout the duration of a TPE series.  Please notify the apheresis physician of any upcoming  procedures, surgeries, or biopsies as TPE will affect coagulation factors.           Chief Complaint:   Shortness of breath, fatigue.           Past Medical History:     Past Medical History:   Diagnosis Date     CAD (coronary artery disease)      CHF (congestive heart failure) (H)      CKD (chronic kidney disease), stage III (H)      Cortical cataract of both eyes      Diabetes (H)      Hyperlipidemia      Hypertension      Ischemic cardiomyopathy      Obesity      CHANTEL (obstructive sleep apnea)     occas cpap     Osteoarthritis           Past Surgical History:     Past Surgical History:   Procedure Laterality Date     C CABG, ARTERY-VEIN, THREE  02/2008     CATARACT IOL, RT/LT       COLONOSCOPY N/A 8/7/2019    Procedure: COLONOSCOPY, WITH POLYPECTOMY AND BIOPSY;  Surgeon: Chauncey Morataya MD;  Location:  GI     CV RIGHT HEART CATH N/A 3/25/2019    Procedure: CV RIGHT HEART CATH;  Surgeon: Moises Santos MD;  Location:  HEART CARDIAC CATH LAB     CV RIGHT HEART CATH N/A 7/10/2019    Procedure: CV RIGHT HEART CATH;  Surgeon: Jak Mccabe MD;  Location:  HEART CARDIAC CATH LAB     CV RIGHT HEART CATH N/A 7/8/2020    Procedure: Right Heart Cath with Leave In [place] already has ICU load;  Surgeon: Jak Mccabe MD;  Location:  HEART CARDIAC CATH LAB     EXTRACTION(S) DENTAL Left 7/13/2020    Procedure: EXTRACTION, TOOTH #11, 12, 13, 15, and 29;  Surgeon: Monica Chao DDS;  Location:  OR     IMPLANT AUTOMATIC IMPLANTABLE CARDIOVERTER DEFIBRILLATOR       INSERT INTRAAORTIC BALLOON PUMP N/A 7/16/2020    Procedure: Subclavian Intra-Aortic Balloon Pump Placement;  Surgeon: Allan Sparrow MD;  Location: [facility] OR     PHACOEMULSIFICATION CLEAR CORNEA WITH STANDARD IOL, VITRECTOMY PARSPLANA 25 GAGUE, COMBINED Left 12/10/2019    Procedure: PHACOEMULSIFICATION, CATARACT, CLEAR CORNEAL INCISION APPROACH, W STD INTRAOCULAR LENS IMPLANT INSERT + VITRECTOMY BY PARS PLANA  USING 25-GAUGE INSTRUMENTS.  ENDOLASER, INFUSION OF 20% SF6 GAS;  Surgeon: Triny Bunch MD;  [facility]     PICC INSERTION Right 07/11/2020    basilic 44 cm total           Social History:   , 4 children, retired, never smoker          Family History:     Family History   Problem Relation Age of Onset     Diabetes Brother      Diabetes Sister      Diabetes Sister      Macular Degeneration No family hx of      Glaucoma No family hx of      Myocardial Infarction No family hx of      Kidney Disease No family hx of           Immunizations:     Most Recent Immunizations   Administered Date(s) Administered     Influenza (IIV3) PF 10/15/2012     Influenza Vaccine IM > 6 months Valent IIV4 10/03/2018     Pneumococcal 23 valent 04/03/2015     TDAP Vaccine (Adacel) 08/04/2009             Allergies:     Allergies   Allergen Reactions     Heparin Heparin Induced Thrombocytopenia     HIT screen sent 7/18/2020. No heparin products until resulted     No Known Drug Allergies           Medications:     Current Facility-Administered Medications   Medication     acetaminophen (TYLENOL) tablet 650 mg     aminocaproic acid (AMICAR) 5 g in sodium chloride 0.9 % 100 mL bolus     aminocaproic acid (AMICAR) 5 g in sodium chloride 0.9 % 100 mL infusion     amiodarone (PACERONE) tablet 200 mg     Anticoagulant Citrate Dextrose Formula A at ratio of 1:10 with blood ([facility])     anticoagulant citrate flush 3 mL     anticoagulant citrate flush 3 mL     aspirin (ASA) chewable tablet 81 mg     atorvastatin (LIPITOR) tablet 40 mg     basiliximab (SIMULECT) 20 mg in sodium chloride 0.9 % 50 mL infusion     calcium gluconate with plasma (administered by Apheresis Staff ONLY)     ceFEPIme (MAXIPIME) 1g vial to attach to  ml bag for ADULTS or NS 50 ml bag for PEDS     ceFEPIme (MAXIPIME) 2 g vial to attach to  ml bag for ADULTS or 50 ml bag for PEDS     glucose gel 15-30 g    Or     dextrose 50 % injection 25-50 mL    Or     glucagon  injection 1 mg     EPINEPHrine (ADRENALIN) 5 mg in sodium chloride 0.9 % 250 mL infusion     [START ON 7/20/2020] furosemide (LASIX) injection 40 mg     HYDROmorphone (PF) (DILAUDID) injection 0.2 mg     insulin aspart (NovoLOG) injection (RAPID ACTING)     insulin aspart (NovoLOG) injection (RAPID ACTING)     insulin glargine (LANTUS PEN) injection 20 Units     isoproterenol (ISUPREL) 1 mg in D5W 50 mL infusion     lidocaine (LMX4) cream     lidocaine (LMX4) cream     lidocaine 1 % 0.1-1 mL     lidocaine 1 % 1 mL     magnesium sulfate 2 g in water intermittent infusion     magnesium sulfate 4 g in 100 mL sterile water (premade)     medication instruction     methylPREDNISolone sodium succinate (solu-MEDROL) 1,000 mg in sodium chloride 0.9 % 250 mL intermittent infusion     mycophenolate (GENERIC EQUIVALENT) capsule 1,500 mg     mycophenolate (GENERIC EQUIVALENT) capsule 1,500 mg     naloxone (NARCAN) injection 0.1-0.4 mg     nitroGLYcerin (NITROSTAT) sublingual tablet 0.4 mg     norepinephrine (LEVOPHED) 16 mg in  mL infusion     oxyCODONE (ROXICODONE) tablet 5 mg     polyethylene glycol (MIRALAX) Packet 17 g     potassium chloride (KLOR-CON) Packet 20-40 mEq     potassium chloride 10 mEq in 100 mL intermittent infusion with 10 mg lidocaine     potassium chloride 10 mEq in 100 mL sterile water intermittent infusion (premix)     potassium chloride 20 mEq in 50 mL intermittent infusion     potassium chloride ER (KLOR-CON M) CR tablet 20-40 mEq     senna-docusate (SENOKOT-S/PERICOLACE) 8.6-50 MG per tablet 1 tablet    Or     senna-docusate (SENOKOT-S/PERICOLACE) 8.6-50 MG per tablet 2 tablet     sodium chloride (PF) 0.9% PF flush 10 mL     sodium chloride (PF) 0.9% PF flush 10 mL     sodium chloride (PF) 0.9% PF flush 3 mL     sodium chloride (PF) 0.9% PF flush 3 mL     sodium chloride (PF) 0.9% PF flush 3 mL     sodium chloride (PF) 0.9% PF flush 3 mL     trimethobenzamide (TIGAN) capsule 300 mg     vancomycin  (VANCOCIN) 1000 mg in dextrose 5% 200 mL PREMIX     vancomycin (VANCOCIN) 1000 mg in dextrose 5% 200 mL PREMIX     vasopressin (VASOSTRICT) 40 Units in sodium chloride 0.9 % 40 mL infusion     Facility-Administered Medications Ordered in Other Encounters   Medication     sodium chloride (PF) 0.9% PF flush 10 mL          Review of Systems:   See above.           Data:     Vitals:    07/19/20 1400 07/19/20 1500 07/19/20 1600 07/19/20 1700   BP:       BP Location:       Pulse:       Resp:   18    Temp:   98.2  F (36.8  C)    TempSrc:   Oral    SpO2: 95% 96% 99% 97%   Weight:       Height:         ROUTINE IP LABS (Last four results)  BMP  Recent Labs   Lab 07/19/20  1613 07/19/20  0441 07/18/20  1646 07/18/20  0342    136 134 136   POTASSIUM 3.9 3.8 4.2 4.1   CHLORIDE 103 105 101 107   BRYANNA 8.6 8.3* 8.5 8.5   CO2 26 27 27 25   BUN 34* 33* 39* 37*   CR 1.46* 1.52* 1.70* 1.68*   * 156* 282* 141*     CBC  Recent Labs   Lab 07/19/20  1613 07/19/20  0441 07/18/20  0342 07/17/20  0350   WBC 7.7 7.9 9.7 11.4*   RBC 3.56* 3.45* 3.28* 3.60*   HGB 10.8* 10.6* 10.1* 10.9*   HCT 34.0* 32.6* 31.8* 34.7*   MCV 96 95 97 96   MCH 30.3 30.7 30.8 30.3   MCHC 31.8 32.5 31.8 31.4*   RDW 14.7 14.8 15.3* 15.1*   PLT 99* 89* 95* 117*     INR  Recent Labs   Lab 07/19/20  1613 07/19/20  1311 07/13/20  0351   INR 1.18* 1.18* 1.05       Attestation:  I met with the patient's wife and discussed the procedure and plan for TPE using plasma as the exchange fluid.  I answered her questions to the best of my ability.  She agreed to proceed and signed the consent forms.  We will begin TPE #1 shortly.    Fracisco Dorantes M.D.  Professor, Transfusion Medicine  Laboratory Medicine & Pathology  Pager: [phone]

## 2020-07-19 NOTE — CONSULTS
[facility], [place]    Hematology/Oncology Consult Note  Patient Name: Lucian Henderson Sr.   MRN: [id number]  YOB: 1955      Date of Service: July 19, 2020  Admission Date: 7/3/2020  Hospital Day # 16          Reason for Consult: HIT      Assessment & Plan   Lucian Henderson Sr. is a 64 year old male with a PMH of ischemic cardiomyopathy, LV systolic dysfunction s/p 3V CABG who was admitted on 7/3/2020 for transplant work up found to have positive HIT antibody.Per MAR Heparin started 7/10 and discontinued 7/18. At the time of heparin start platelet count 185 and down-trended to 95 at the time of HIT testing. Pt pretest probability using 4T score calculated to be at intermediate risk of HIT (+2 thrombocytopenia, +2 timing. +1 for other possibility).     Hematology/Oncology Problem list:   # Heparin induced thrombocytopenia  # Chronic Normocytic Anemia      Summary of Recommendations:    Agree with transfusion medicine consultation for PLEX prior to procedure and during proceedure    No contraindication to proceeding with heart transplant, aware of intraoperative use of heparin and this is fine    Pending CORRINE, need to anticoagulate with alternative (either bivalirudin or argatroban) until CORRINE results comes back.  If negative no need for anticoagulation and would continue with routine post transplant cares    If CORRINE positive would recommend anticoagulation with low dose argatroban 0.2mcg/kg/min, bivalirudin ok alternative as well, will defer to primary team preference       Thank you for involving us in the care of this patient. We will continue to follow during the hospitalization.    Patient was seen and plan of care developed with Dr. Allen.    Funmi Tucker MD MS  Hematology Oncology Transplant Fellow      Attending Note:  I have reviewed the patient chart, and interviewed and examined the patient.  I agree with the assessment and plan. Patient with  + HIT immunoassay, intermediate probability. Heparin has been held since yesterday. No indication thrombus at this time., but if he has true heparin induce thrombocytopenia he is at risk for both venous and arterial thrombosis. Since he is going to OR within the next few hours, no reason to start alternative anticoagulation right now, because it would just need to be turned off shortly after starting. However, post-operatively he needs anticoagulation until we know whether or not this is true HIT.     -OK to go forward with transplant  -Plasmapheresis to remove HIT antibodies pre-op an intraoperative.   -Can use heparin intraoperative for bypass  -Use alternative anticoagulation with bivalirudin or argatroban until we have results of serotonin release assay (CORRINE) , continue if CORRINE is +.  -hold anticoagulation if platelets drop to <50k.  Discussed with Dr. Sheridan and Dr. Roscoe Allen MD  Hematology  ______________________________________________________________________      History of Present Illness   Lucian Henderson Sr. is a 64 year old male with a PMH of ischemic cardiomyopathy, LV systolic dysfunction s/p 3V CABG who was admitted on 7/3/2020 for transplant work up found to have positive HIT antibody.    On admission patient presented with 3 days history of worsening shortness of breath and fatigue with minimal exertion. Apparently was able to walk  1-2 blocks without significant fatigue. Currently patient denies changes in sensation in his upper or lower extremities, additionally denies swelling or pain in these areas. States that he overall feels fine.     Review of Systems    The 5 point Review of Systems is negative other than noted in the HPI or here.     Past Medical History    Past Medical History:   Diagnosis Date     CAD (coronary artery disease)      CHF (congestive heart failure) (H)      CKD (chronic kidney disease), stage III (H)      Cortical cataract of both eyes       Diabetes (H)      Hyperlipidemia      Hypertension      Ischemic cardiomyopathy      Obesity      CHANTEL (obstructive sleep apnea)     occas cpap     Osteoarthritis        Past Surgical History   Past Surgical History:   Procedure Laterality Date     C CABG, ARTERY-VEIN, THREE  02/2008     CATARACT IOL, RT/LT       COLONOSCOPY N/A 8/7/2019    Procedure: COLONOSCOPY, WITH POLYPECTOMY AND BIOPSY;  Surgeon: Chauncey Morataya MD;  [facility]     CV RIGHT HEART CATH N/A 3/25/2019    Procedure: CV RIGHT HEART CATH;  Surgeon: Moises Santos MD;  Location:  HEART CARDIAC CATH LAB     CV RIGHT HEART CATH N/A 7/10/2019    Procedure: CV RIGHT HEART CATH;  Surgeon: Jak Mccabe MD;  Location:  HEART CARDIAC CATH LAB     CV RIGHT HEART CATH N/A 7/8/2020    Procedure: Right Heart Cath with Leave In [place] already has ICU load;  Surgeon: Jak Mccabe MD;  Location:  HEART CARDIAC CATH LAB     EXTRACTION(S) DENTAL Left 7/13/2020    Procedure: EXTRACTION, TOOTH #11, 12, 13, 15, and 29;  Surgeon: Monica Chao DDS;  Location: [facility] OR     IMPLANT AUTOMATIC IMPLANTABLE CARDIOVERTER DEFIBRILLATOR       INSERT INTRAAORTIC BALLOON PUMP N/A 7/16/2020    Procedure: Subclavian Intra-Aortic Balloon Pump Placement;  Surgeon: Allan Sparrow MD;  Location: [facility] OR     PHACOEMULSIFICATION CLEAR CORNEA WITH STANDARD IOL, VITRECTOMY PARSPLANA 25 GAGUE, COMBINED Left 12/10/2019    Procedure: PHACOEMULSIFICATION, CATARACT, CLEAR CORNEAL INCISION APPROACH, W STD INTRAOCULAR LENS IMPLANT INSERT + VITRECTOMY BY PARS PLANA  USING 25-GAUGE INSTRUMENTS. ENDOLASER, INFUSION OF 20% SF6 GAS;  Surgeon: Triny Bunch MD;  [facility]     PICC INSERTION Right 07/11/2020    basilic 44 cm total        Social History   Social History     Tobacco Use     Smoking status: Never Smoker     Smokeless tobacco: Never Used     Tobacco comment: Never smoked; non-smoking household   Substance Use Topics     Alcohol use: No      Alcohol/week: 0.0 standard drinks     Drug use: No       Family History   Family History   Problem Relation Age of Onset     Diabetes Brother      Diabetes Sister      Diabetes Sister      Macular Degeneration No family hx of      Glaucoma No family hx of      Myocardial Infarction No family hx of      Kidney Disease No family hx of        Prior to Admission Medications   Prior to Admission Medications   Prescriptions Last Dose Informant Patient Reported? Taking?   aspirin 81 MG tablet  Self No No   Sig: Take 1 tablet (81 mg) by mouth daily   atorvastatin (LIPITOR) 40 MG tablet  Self No No   Sig: Take 1 tablet (40 mg) by mouth daily   blood glucose (ACCU-CHEK SMARTVIEW) test strip  Self No No   Sig: USE TO TEST THREE TIMES DAILY AS DIRECTED   blood glucose (NO BRAND SPECIFIED) test strip  Self No No   Sig: Use to test blood sugar 2 times daily or as directed.   blood glucose monitoring (ACCU-CHEK FELIPE SMARTVIEW) meter device kit  Self No No   Sig: Use to test blood sugar 3-4 times daily, as directed.   blood glucose monitoring (ONE TOUCH DELICA) lancets  Self No No   Sig: Use to test blood sugars 2 times daily.   clopidogrel (PLAVIX) 75 MG tablet  Self No No   Sig: Take 1 tablet (75 mg) by mouth daily   exenatide ER (BYDUREON) 2 MG pen  Self No No   Sig: Inject 2 mg Subcutaneous every 7 days   furosemide (LASIX) 20 MG tablet  Self No No   Sig: Take 2 tablets (40 mg) by mouth 2 times daily   glimepiride (AMARYL) 4 MG tablet  Self No No   Sig: Take 1 tablet (4 mg) by mouth every morning (before breakfast)   hydrALAZINE (APRESOLINE) 25 MG tablet  Self No No   Sig: Take 1 tablet (25 mg) by mouth 3 times daily   insulin glargine (LANTUS SOLOSTAR) 100 UNIT/ML pen  Self No No   Sig: Take 8 units in the morning and 40 units in the evening   insulin pen needle (B-D U/F) 31G X 5 MM miscellaneous  Self No No   Si Units by Device route 2 times daily Use 2 pen needles daily or as directed.   isosorbide mononitrate (IMDUR) 60  MG 24 hr tablet  Self No No   Sig: Take 1 tablet (60 mg) by mouth daily   losartan (COZAAR) 50 MG tablet  Spouse/Significant Other No No   Sig: Take 1 tablet (50 mg) by mouth daily   nitroglycerin (NITROSTAT) 0.4 MG SL tablet  Self No No   Sig: Place 1 tablet (0.4 mg) under the tongue every 5 minutes as needed for chest pain If you are still having symptoms after 3 doses (15 minutes) call 911.   order for DME  Self No No   Sig: Auto CPAP 8-15 cmH20      Facility-Administered Medications Last Administration Doses Remaining   erythromycin (ROMYCIN) ophthalmic ointment None recorded    lidocaine (PF) (XYLOCAINE) 2 % injection 10 mL None recorded 1          Allergies      Allergies   Allergen Reactions     Heparin Heparin Induced Thrombocytopenia     HIT screen sent 7/18/2020. No heparin products until resulted     No Known Drug Allergies        Physical Exam   Vital Signs: Temp: 98  F (36.7  C) Temp src: Oral    Heart Rate: 84 Resp: 18 SpO2: 95 % O2 Device: None (Room air) Oxygen Delivery: 2 LPM  Weight: 166 lbs 3.2 oz      GENERAL: Alert, interactive, NAD, laying in bed   HEENT: AT/NC, sclera anicteric,  EOMI,   RESP: clear to auscultation bilaterally, no crackles or wheezes  CARDIAC: regular rate and rhythm, normal S1 and S2, no murmur appreciated  EXTREMITIES: No LE edema, Warm well perfused, no discoloration of fingers or toes,   SKIN: Warm and dry, no jaundice or rash  NEURO: Speech fluent  PSYCH: appropriate mood, normal speech, linear thought.       Data     Results for orders placed or performed during the hospital encounter of 07/03/20 (from the past 24 hour(s))   Basic metabolic panel   Result Value Ref Range    Sodium 134 133 - 144 mmol/L    Potassium 4.2 3.4 - 5.3 mmol/L    Chloride 101 94 - 109 mmol/L    Carbon Dioxide 27 20 - 32 mmol/L    Anion Gap 6 3 - 14 mmol/L    Glucose 282 (H) 70 - 99 mg/dL    Urea Nitrogen 39 (H) 7 - 30 mg/dL    Creatinine 1.70 (H) 0.66 - 1.25 mg/dL    GFR Estimate 41 (L) >60  mL/min/[1.73_m2]    GFR Estimate If Black 47 (L) >60 mL/min/[1.73_m2]    Calcium 8.5 8.5 - 10.1 mg/dL   Heparin Induced Thrombocytopenia Reflex   Result Value Ref Range    Heparin Induced Thrombocytopenia Screen Positive (AA) NEG^Negative   CBC with platelets   Result Value Ref Range    WBC 7.9 4.0 - 11.0 10e9/L    RBC Count 3.45 (L) 4.4 - 5.9 10e12/L    Hemoglobin 10.6 (L) 13.3 - 17.7 g/dL    Hematocrit 32.6 (L) 40.0 - 53.0 %    MCV 95 78 - 100 fl    MCH 30.7 26.5 - 33.0 pg    MCHC 32.5 31.5 - 36.5 g/dL    RDW 14.8 10.0 - 15.0 %    Platelet Count 89 (L) 150 - 450 10e9/L   Basic metabolic panel   Result Value Ref Range    Sodium 136 133 - 144 mmol/L    Potassium 3.8 3.4 - 5.3 mmol/L    Chloride 105 94 - 109 mmol/L    Carbon Dioxide 27 20 - 32 mmol/L    Anion Gap 4 3 - 14 mmol/L    Glucose 156 (H) 70 - 99 mg/dL    Urea Nitrogen 33 (H) 7 - 30 mg/dL    Creatinine 1.52 (H) 0.66 - 1.25 mg/dL    GFR Estimate 47 (L) >60 mL/min/[1.73_m2]    GFR Estimate If Black 54 (L) >60 mL/min/[1.73_m2]    Calcium 8.3 (L) 8.5 - 10.1 mg/dL   Magnesium (AM Draw)   Result Value Ref Range    Magnesium 1.8 1.6 - 2.3 mg/dL   Phosphorus (AM Draw)   Result Value Ref Range    Phosphorus 2.5 2.5 - 4.5 mg/dL   Blood gas venous with oxyhemoglobin   Result Value Ref Range    Ph Venous 7.39 7.32 - 7.43 pH    PCO2 Venous 50 40 - 50 mm Hg    PO2 Venous 35 25 - 47 mm Hg    Bicarbonate Venous 30 (H) 21 - 28 mmol/L    FIO2 2     Oxyhemoglobin Venous 62 %    Base Excess Venous 3.8 mmol/L   Glucose by meter   Result Value Ref Range    Glucose 157 (H) 70 - 99 mg/dL   XR Chest Port 1 View    Narrative    Exam: XR CHEST PORT 1 VW, 7/19/2020 5:32 AM    Indication: SG catheter, IABP monitoring    Comparison: 7/18/2020    Findings:   Portable radiograph of the chest. Right PICC is unchanged. Right IJ  central venous catheter has been removed. Unchanged left chest wall  cardiac device. Intra-aortic balloon pump radiopaque marker projects  at the level of the  inna. Cardiac silhouette is unchanged. Hazy left  greater than right bibasilar airspace opacities are unchanged. No  pneumothorax. No pleural effusion. Upper abdomen is unremarkable.      Impression    Impression:   1. Intra-aortic balloon pump radiopaque marker projects at the level  of the inna.  2. Unchanged hazy left greater than right bibasilar airspace  opacities.    I have personally reviewed the examination and initial interpretation  and I agree with the findings.    HARI RIZO MD   Glucose by meter   Result Value Ref Range    Glucose 131 (H) 70 - 99 mg/dL   Glucose by meter   Result Value Ref Range    Glucose 413 (H) 70 - 99 mg/dL   INR   Result Value Ref Range    INR 1.18 (H) 0.86 - 1.14   Partial thromboplastin time   Result Value Ref Range    PTT 35 22 - 37 sec   Fibrinogen activity   Result Value Ref Range    Fibrinogen 535 (H) 200 - 420 mg/dL         I have personally reviewed the following labs/imaging:  CBC  Recent Labs   Lab 07/19/20  0441 07/18/20  0342 07/17/20  0350 07/17/20  0001   WBC 7.9 9.7 11.4* 11.3*   RBC 3.45* 3.28* 3.60* 3.46*   HGB 10.6* 10.1* 10.9* 10.5*   HCT 32.6* 31.8* 34.7* 33.6*   MCV 95 97 96 97   MCH 30.7 30.8 30.3 30.3   MCHC 32.5 31.8 31.4* 31.3*   RDW 14.8 15.3* 15.1* 15.0   PLT 89* 95* 117* 127*     CMP  Recent Labs   Lab 07/19/20  0441 07/18/20  1646 07/18/20  0342 07/17/20  1522  07/16/20  0350 07/15/20  1522    134 136 136   < > 138 138   POTASSIUM 3.8 4.2 4.1 4.6   < > 4.3 4.3   CHLORIDE 105 101 107 109   < > 108 106   CO2 27 27 25 22   < > 25 27   ANIONGAP 4 6 4 5   < > 4 4   * 282* 141* 292*   < > 146* 273*   BUN 33* 39* 37* 39*   < > 32* 36*   CR 1.52* 1.70* 1.68* 1.61*   < > 1.73* 1.86*   GFRESTIMATED 47* 41* 41* 44*   < > 40* 37*   GFRESTBLACK 54* 47* 48* 51*   < > 46* 43*   BRYANNA 8.3* 8.5 8.5 8.0*   < > 8.8 8.4*   MAG 1.8  --  2.1  --   --  2.4* 2.3   PHOS 2.5  --  2.5  --   --   --   --     < > = values in this interval not displayed.      INR  Recent Labs   Lab 07/19/20  1311 07/13/20  0351   INR 1.18* 1.05

## 2020-07-19 NOTE — PROGRESS NOTES
PATHOLOGY HLA CROSSMATCH CONSULTATION: DONOR/RECIPIENT  VIRTUAL CROSSMATCH - Heart  Consultation Date:   Consultation Requested by: Dr. Griselli    Regarding: Compatibility of  donor organ UNOS #[id number] from OPO: CORS  with Lucian Henderson Sr.    Findings: Regarding a virtual crossmatch between Lucian Henderson Sr. and  donor listed above (match ID [id number]):  The most recent (2020) and 1 additional patient serum/sera  were analyzed.  The patient has no antibodies listed with HLA specificity against the donor organ.      Record Review Indicates: I personally reviewed the most recent serum, the historic peak sera, and all other sera with solid-phase HLA Single Antigen test results:  The patient has no HLA antibodies against the donor organ.     The results of this virtual XM are:   -most recent serum: compatible   -peak #1: compatible      Disclaimer: Clinical judgement must take into account other factors, such as non-HLA antibodies not detected in the assay. The VXM gives probabilities only.  The probability does not account for the potential for auto-antibodies that may be present in the patient's serum.  These autoantibodies may render the physical crossmatch falsely positive, and would be detected by an autologous crossmatch.  When possible, confirm findings with prospective allogeneic and autologous flow crossmatches before going to transplant as clinically indicated.     Candida Stephens MD  Medical Director, Immunology/Histocompatibility Laborator

## 2020-07-19 NOTE — PROGRESS NOTES
07/17/20 1600   Quick Adds   Type of Visit Initial PT Evaluation       Present yes   Language Sinhala   Living Environment   Lives With spouse   Living Arrangements house   Home Accessibility stairs to enter home;stairs within home   Number of Stairs, Main Entrance 3  (no rail)   Stair Railings, Main Entrance none   Number of Stairs, Within Home, Primary 10  (to basement, does nto need to do)   Stair Railings, Within Home, Primary railing on right side (ascending)   Transportation Anticipated family or friend will provide   Living Environment Comment Pt lives with supportive wife   Self-Care   Usual Activity Tolerance good  (until last couple months, then significant decline/SOB w HF)   Current Activity Tolerance fair   Regular Exercise Yes   Activity/Exercise Type walking  (treadmill)   Exercise Amount/Frequency 3-5 times/wk   Activity/Exercise/Self-Care Comment Pt was i with all ADLs, mobility   Functional Level Prior   Ambulation 0-->independent   Transferring 0-->independent   Toileting 0-->independent   Bathing 0-->independent   Communication 0-->understands/communicates without difficulty   Swallowing 0-->swallows foods/liquids without difficulty   Cognition 0 - no cognition issues reported   Which of the above functional risks had a recent onset or change? ambulation;transferring   Prior Functional Level Comment Pt was I and exercising regurlarly until last couple months   General Information   Onset of Illness/Injury or Date of Surgery - Date 07/16/20   Referring Physician Arvin Sheridan MD   Patient/Family Goals Statement return to home   Pertinent History of Current Problem (include personal factors and/or comorbidities that impact the POC) 64 year old male with a PMH of HTN, DMII, obesity, CKD, CHANTEL, ischemic cardiomyopathy and severe LV systolic dysfunction s/p 3V CABG (2008) and primary prevention ICD ( 4/2/15). Currently he presents with 3 days of worsening fatigue and  SOB with minimal exertion. Patient claims he could walk 1-2 blocks last week but has been barely able to ambulate home recently. Admitted for possible LVAD/Transplant work up. SC IABP and listed status 2 for heart transplant 7/14.   Precautions/Limitations other (see comments)  (All mobility with RT/RN to monitor SCIABP)   Weight-Bearing Status - LLE full weight-bearing   Weight-Bearing Status - RLE full weight-bearing   General Observations PT very agreeable to PT, eager to get back to moving, exercising   Cognitive Status Examination   Orientation orientation to person, place and time   Level of Consciousness alert   Follows Commands and Answers Questions 100% of the time   Personal Safety and Judgment intact   Memory intact   Posture    Posture Not impaired   Range of Motion (ROM)   ROM Comment B LEs WFL   Strength   Strength Comments B LEs mildly impaired due to less activity [place] the last couple months overall, significant the last couple weeks   Bed Mobility   Bed Mobility Comments Per RN min A   Transfer Skills   Transfer Comments Min A to CGA   Gait   Gait Comments Pre gait min A with wh walker, unable to leave room as need to coodt wtih RT   Balance   Balance Comments good with wh walker   General Therapy Interventions   Planned Therapy Interventions bed mobility training;transfer training;gait training;strengthening;risk factor education;balance training;home program guidelines;progressive activity/exercise   Clinical Impression   Criteria for Skilled Therapeutic Intervention yes, treatment indicated   PT Diagnosis imparied funtional mobility   Influenced by the following impairments decreased act tolerance, balance, increased precs   Functional limitations due to impairments decreased I with transfers, gait, bed mob, barrier navigation, ADLs   Clinical Presentation Stable/Uncomplicated   Clinical Decision Making (Complexity) Moderate complexity   Therapy Frequency 6x/week   Anticipated Discharge  "Disposition   (TCU now but likely progress to home)   Risk & Benefits of therapy have been explained Yes   Patient, Family & other staff in agreement with plan of care Yes   [facility] AM-PAC  \"6 Clicks\" V.2 Basic Mobility Inpatient Short Form   1. Turning from your back to your side while in a flat bed without using bedrails? 3 - A Little   2. Moving from lying on your back to sitting on the side of a flat bed without using bedrails? 3 - A Little   3. Moving to and from a bed to a chair (including a wheelchair)? 3 - A Little   4. Standing up from a chair using your arms (e.g., wheelchair, or bedside chair)? 3 - A Little   5. To walk in hospital room? 2 - A Lot   6. Climbing 3-5 steps with a railing? 2 - A Lot   Basic Mobility Raw Score (Score out of 24.Lower scores equate to lower levels of function) 16     "

## 2020-07-20 ENCOUNTER — APPOINTMENT (OUTPATIENT)
Dept: GENERAL RADIOLOGY | Facility: CLINIC | Age: 65
DRG: 001 | End: 2020-07-20
Attending: STUDENT IN AN ORGANIZED HEALTH CARE EDUCATION/TRAINING PROGRAM
Payer: COMMERCIAL

## 2020-07-20 ENCOUNTER — APPOINTMENT (OUTPATIENT)
Dept: GENERAL RADIOLOGY | Facility: CLINIC | Age: 65
DRG: 001 | End: 2020-07-20
Attending: SURGERY
Payer: COMMERCIAL

## 2020-07-20 ENCOUNTER — APPOINTMENT (OUTPATIENT)
Dept: GENERAL RADIOLOGY | Facility: CLINIC | Age: 65
DRG: 001 | End: 2020-07-20
Attending: INTERNAL MEDICINE
Payer: COMMERCIAL

## 2020-07-20 ENCOUNTER — APPOINTMENT (OUTPATIENT)
Dept: CARDIOLOGY | Facility: CLINIC | Age: 65
DRG: 001 | End: 2020-07-20
Attending: INTERNAL MEDICINE
Payer: COMMERCIAL

## 2020-07-20 ENCOUNTER — APPOINTMENT (OUTPATIENT)
Dept: GENERAL RADIOLOGY | Facility: CLINIC | Age: 65
DRG: 001 | End: 2020-07-20
Attending: THORACIC SURGERY (CARDIOTHORACIC VASCULAR SURGERY)
Payer: COMMERCIAL

## 2020-07-20 LAB
ABO + RH BLD: NORMAL
ABO + RH BLD: NORMAL
ALBUMIN SERPL-MCNC: 2.8 G/DL (ref 3.4–5)
ALP SERPL-CCNC: 69 U/L (ref 40–150)
ALT SERPL W P-5'-P-CCNC: 39 U/L (ref 0–70)
ANGLE RATE OF CLOT GROWTH: 61.5 DEG (ref 59–74)
ANGLE RATE OF CLOT GROWTH: 64 DEG (ref 59–74)
ANION GAP SERPL CALCULATED.3IONS-SCNC: 10 MMOL/L (ref 3–14)
ANION GAP SERPL CALCULATED.3IONS-SCNC: 15 MMOL/L (ref 3–14)
ANION GAP SERPL CALCULATED.3IONS-SCNC: 17 MMOL/L (ref 3–14)
ANION GAP SERPL CALCULATED.3IONS-SCNC: 3 MMOL/L (ref 3–14)
ANION GAP SERPL CALCULATED.3IONS-SCNC: 3 MMOL/L (ref 3–14)
APTT PPP: 30 SEC (ref 22–37)
APTT PPP: 36 SEC (ref 22–37)
APTT PPP: 37 SEC (ref 22–37)
AST SERPL W P-5'-P-CCNC: 108 U/L (ref 0–45)
BASE DEFICIT BLDA-SCNC: 0.3 MMOL/L
BASE DEFICIT BLDA-SCNC: 1.5 MMOL/L
BASE DEFICIT BLDA-SCNC: 1.5 MMOL/L
BASE DEFICIT BLDA-SCNC: 1.7 MMOL/L
BASE DEFICIT BLDA-SCNC: 3.3 MMOL/L
BASE DEFICIT BLDA-SCNC: 4.3 MMOL/L
BASE DEFICIT BLDA-SCNC: 7 MMOL/L
BASE DEFICIT BLDV-SCNC: 2 MMOL/L
BASE DEFICIT BLDV-SCNC: 4.4 MMOL/L
BASE DEFICIT BLDV-SCNC: 7.3 MMOL/L
BASE DEFICIT BLDV-SCNC: 8.7 MMOL/L
BASE EXCESS BLDA CALC-SCNC: 2.9 MMOL/L
BASE EXCESS BLDV CALC-SCNC: 0.2 MMOL/L
BILIRUB SERPL-MCNC: 1.8 MG/DL (ref 0.2–1.3)
BLD GP AB SCN SERPL QL: NORMAL
BLD PROD TYP BPU: NORMAL
BLD UNIT ID BPU: 0
BLOOD BANK CMNT PATIENT-IMP: NORMAL
BLOOD PRODUCT CODE: NORMAL
BPU ID: NORMAL
BUN SERPL-MCNC: 28 MG/DL (ref 7–30)
BUN SERPL-MCNC: 28 MG/DL (ref 7–30)
BUN SERPL-MCNC: 29 MG/DL (ref 7–30)
BUN SERPL-MCNC: 30 MG/DL (ref 7–30)
BUN SERPL-MCNC: 30 MG/DL (ref 7–30)
CA-I BLD-MCNC: 4.1 MG/DL (ref 4.4–5.2)
CA-I BLD-MCNC: 4.4 MG/DL (ref 4.4–5.2)
CA-I BLD-MCNC: 4.5 MG/DL (ref 4.4–5.2)
CA-I BLD-MCNC: 4.7 MG/DL (ref 4.4–5.2)
CA-I BLD-MCNC: 4.7 MG/DL (ref 4.4–5.2)
CA-I BLD-MCNC: 5.4 MG/DL (ref 4.4–5.2)
CALCIUM SERPL-MCNC: 8.3 MG/DL (ref 8.5–10.1)
CALCIUM SERPL-MCNC: 8.4 MG/DL (ref 8.5–10.1)
CALCIUM SERPL-MCNC: 8.6 MG/DL (ref 8.5–10.1)
CALCIUM SERPL-MCNC: 9 MG/DL (ref 8.5–10.1)
CALCIUM SERPL-MCNC: 9.1 MG/DL (ref 8.5–10.1)
CHLORIDE SERPL-SCNC: 104 MMOL/L (ref 94–109)
CHLORIDE SERPL-SCNC: 104 MMOL/L (ref 94–109)
CHLORIDE SERPL-SCNC: 107 MMOL/L (ref 94–109)
CHLORIDE SERPL-SCNC: 110 MMOL/L (ref 94–109)
CHLORIDE SERPL-SCNC: 111 MMOL/L (ref 94–109)
CI HYPOCOAGULATION INDEX: 0.7 RATIO (ref 0–3)
CI HYPOCOAGULATION INDEX: 1.3 RATIO (ref 0–3)
CLOT LYSIS 30M P MA LENFR BLD TEG: 0 % (ref 0–8)
CLOT LYSIS 30M P MA LENFR BLD TEG: 0 % (ref 0–8)
CLOT STRENGTH BLD TEG: 6 KD/SC (ref 5.3–13.2)
CLOT STRENGTH BLD TEG: 6.3 KD/SC (ref 5.3–13.2)
CMV IGG SERPL QL IA: >8 AI (ref 0–0.8)
CO2 SERPL-SCNC: 20 MMOL/L (ref 20–32)
CO2 SERPL-SCNC: 23 MMOL/L (ref 20–32)
CO2 SERPL-SCNC: 25 MMOL/L (ref 20–32)
CO2 SERPL-SCNC: 27 MMOL/L (ref 20–32)
CO2 SERPL-SCNC: 29 MMOL/L (ref 20–32)
CREAT SERPL-MCNC: 1.33 MG/DL (ref 0.66–1.25)
CREAT SERPL-MCNC: 1.37 MG/DL (ref 0.66–1.25)
CREAT SERPL-MCNC: 1.43 MG/DL (ref 0.66–1.25)
CREAT SERPL-MCNC: 1.45 MG/DL (ref 0.66–1.25)
CREAT SERPL-MCNC: 1.45 MG/DL (ref 0.66–1.25)
EBV VCA IGG SER QL IA: 3.2 AI (ref 0–0.8)
ERYTHROCYTE [DISTWIDTH] IN BLOOD BY AUTOMATED COUNT: 14.6 % (ref 10–15)
ERYTHROCYTE [DISTWIDTH] IN BLOOD BY AUTOMATED COUNT: 15.4 % (ref 10–15)
ERYTHROCYTE [DISTWIDTH] IN BLOOD BY AUTOMATED COUNT: 15.9 % (ref 10–15)
ERYTHROCYTE [DISTWIDTH] IN BLOOD BY AUTOMATED COUNT: 17.5 % (ref 10–15)
ERYTHROCYTE [DISTWIDTH] IN BLOOD BY AUTOMATED COUNT: 17.5 % (ref 10–15)
FIBRINOGEN PPP-MCNC: 238 MG/DL (ref 200–420)
FIBRINOGEN PPP-MCNC: 244 MG/DL (ref 200–420)
FIBRINOGEN PPP-MCNC: 295 MG/DL (ref 200–420)
GFR SERPL CREATININE-BSD FRML MDRD: 50 ML/MIN/{1.73_M2}
GFR SERPL CREATININE-BSD FRML MDRD: 53 ML/MIN/{1.73_M2}
GFR SERPL CREATININE-BSD FRML MDRD: 55 ML/MIN/{1.73_M2}
GLUCOSE BLD-MCNC: 334 MG/DL (ref 70–99)
GLUCOSE BLDC GLUCOMTR-MCNC: 138 MG/DL (ref 70–99)
GLUCOSE BLDC GLUCOMTR-MCNC: 146 MG/DL (ref 70–99)
GLUCOSE BLDC GLUCOMTR-MCNC: 148 MG/DL (ref 70–99)
GLUCOSE BLDC GLUCOMTR-MCNC: 157 MG/DL (ref 70–99)
GLUCOSE BLDC GLUCOMTR-MCNC: 166 MG/DL (ref 70–99)
GLUCOSE BLDC GLUCOMTR-MCNC: 167 MG/DL (ref 70–99)
GLUCOSE BLDC GLUCOMTR-MCNC: 178 MG/DL (ref 70–99)
GLUCOSE BLDC GLUCOMTR-MCNC: 187 MG/DL (ref 70–99)
GLUCOSE BLDC GLUCOMTR-MCNC: 216 MG/DL (ref 70–99)
GLUCOSE BLDC GLUCOMTR-MCNC: 229 MG/DL (ref 70–99)
GLUCOSE BLDC GLUCOMTR-MCNC: 232 MG/DL (ref 70–99)
GLUCOSE BLDC GLUCOMTR-MCNC: 249 MG/DL (ref 70–99)
GLUCOSE BLDC GLUCOMTR-MCNC: 250 MG/DL (ref 70–99)
GLUCOSE BLDC GLUCOMTR-MCNC: 263 MG/DL (ref 70–99)
GLUCOSE BLDC GLUCOMTR-MCNC: 278 MG/DL (ref 70–99)
GLUCOSE BLDC GLUCOMTR-MCNC: 289 MG/DL (ref 70–99)
GLUCOSE BLDC GLUCOMTR-MCNC: 299 MG/DL (ref 70–99)
GLUCOSE BLDC GLUCOMTR-MCNC: 305 MG/DL (ref 70–99)
GLUCOSE BLDC GLUCOMTR-MCNC: 308 MG/DL (ref 70–99)
GLUCOSE BLDC GLUCOMTR-MCNC: 310 MG/DL (ref 70–99)
GLUCOSE BLDC GLUCOMTR-MCNC: 316 MG/DL (ref 70–99)
GLUCOSE BLDC GLUCOMTR-MCNC: 319 MG/DL (ref 70–99)
GLUCOSE BLDC GLUCOMTR-MCNC: 321 MG/DL (ref 70–99)
GLUCOSE BLDC GLUCOMTR-MCNC: 327 MG/DL (ref 70–99)
GLUCOSE BLDC GLUCOMTR-MCNC: 330 MG/DL (ref 70–99)
GLUCOSE BLDC GLUCOMTR-MCNC: 331 MG/DL (ref 70–99)
GLUCOSE BLDC GLUCOMTR-MCNC: 338 MG/DL (ref 70–99)
GLUCOSE BLDC GLUCOMTR-MCNC: 341 MG/DL (ref 70–99)
GLUCOSE BLDC GLUCOMTR-MCNC: 355 MG/DL (ref 70–99)
GLUCOSE SERPL-MCNC: 178 MG/DL (ref 70–99)
GLUCOSE SERPL-MCNC: 193 MG/DL (ref 70–99)
GLUCOSE SERPL-MCNC: 322 MG/DL (ref 70–99)
GLUCOSE SERPL-MCNC: 324 MG/DL (ref 70–99)
GLUCOSE SERPL-MCNC: 352 MG/DL (ref 70–99)
HCO3 BLD-SCNC: 18 MMOL/L (ref 21–28)
HCO3 BLD-SCNC: 20 MMOL/L (ref 21–28)
HCO3 BLD-SCNC: 22 MMOL/L (ref 21–28)
HCO3 BLD-SCNC: 22 MMOL/L (ref 21–28)
HCO3 BLD-SCNC: 23 MMOL/L (ref 21–28)
HCO3 BLD-SCNC: 23 MMOL/L (ref 21–28)
HCO3 BLD-SCNC: 25 MMOL/L (ref 21–28)
HCO3 BLD-SCNC: 28 MMOL/L (ref 21–28)
HCO3 BLDV-SCNC: 17 MMOL/L (ref 21–28)
HCO3 BLDV-SCNC: 17 MMOL/L (ref 21–28)
HCO3 BLDV-SCNC: 21 MMOL/L (ref 21–28)
HCO3 BLDV-SCNC: 23 MMOL/L (ref 21–28)
HCO3 BLDV-SCNC: 25 MMOL/L (ref 21–28)
HCT VFR BLD AUTO: 22.5 % (ref 40–53)
HCT VFR BLD AUTO: 23.6 % (ref 40–53)
HCT VFR BLD AUTO: 25.5 % (ref 40–53)
HCT VFR BLD AUTO: 28.3 % (ref 40–53)
HCT VFR BLD AUTO: 29.2 % (ref 40–53)
HGB BLD-MCNC: 7.4 G/DL (ref 13.3–17.7)
HGB BLD-MCNC: 7.5 G/DL (ref 13.3–17.7)
HGB BLD-MCNC: 8.1 G/DL (ref 13.3–17.7)
HGB BLD-MCNC: 8.9 G/DL (ref 13.3–17.7)
HGB BLD-MCNC: 9.4 G/DL (ref 13.3–17.7)
HGB BLD-MCNC: 9.9 G/DL (ref 13.3–17.7)
HIV 1+2 AB+HIV1 P24 AG SERPL QL IA: NONREACTIVE
INR PPP: 1.35 (ref 0.86–1.14)
INR PPP: 1.35 (ref 0.86–1.14)
INR PPP: 1.52 (ref 0.86–1.14)
INR PPP: 1.59 (ref 0.86–1.14)
INTERPRETATION ECG - MUSE: NORMAL
K TIME TO SPEC CLOT STRENGTH: 2.1 MIN (ref 1–3)
K TIME TO SPEC CLOT STRENGTH: 2.2 MIN (ref 1–3)
LACTATE BLD-SCNC: 1.5 MMOL/L (ref 0.7–2)
LACTATE BLD-SCNC: 10.7 MMOL/L (ref 0.7–2)
LACTATE BLD-SCNC: 11.7 MMOL/L (ref 0.7–2)
LACTATE BLD-SCNC: 2 MMOL/L (ref 0.7–2)
LACTATE BLD-SCNC: 2.4 MMOL/L (ref 0.7–2)
LACTATE BLD-SCNC: 5.7 MMOL/L (ref 0.7–2)
LACTATE BLD-SCNC: 5.8 MMOL/L (ref 0.7–2)
LACTATE BLD-SCNC: 8.5 MMOL/L (ref 0.7–2)
LACTATE BLD-SCNC: 9.9 MMOL/L (ref 0.7–2)
LY60 LYSIS AT 60 MINUTES: 0 % (ref 0–15)
LY60 LYSIS AT 60 MINUTES: 0.3 % (ref 0–15)
MA MAXIMUM CLOT STRENGTH: 54.7 MM (ref 55–74)
MA MAXIMUM CLOT STRENGTH: 55.7 MM (ref 55–74)
MAGNESIUM SERPL-MCNC: 2.5 MG/DL (ref 1.6–2.3)
MAGNESIUM SERPL-MCNC: 2.6 MG/DL (ref 1.6–2.3)
MAGNESIUM SERPL-MCNC: 2.7 MG/DL (ref 1.6–2.3)
MAGNESIUM SERPL-MCNC: 3.1 MG/DL (ref 1.6–2.3)
MCH RBC QN AUTO: 29.5 PG (ref 26.5–33)
MCH RBC QN AUTO: 30.2 PG (ref 26.5–33)
MCH RBC QN AUTO: 30.7 PG (ref 26.5–33)
MCHC RBC AUTO-ENTMCNC: 31.4 G/DL (ref 31.5–36.5)
MCHC RBC AUTO-ENTMCNC: 31.8 G/DL (ref 31.5–36.5)
MCHC RBC AUTO-ENTMCNC: 33.2 G/DL (ref 31.5–36.5)
MCHC RBC AUTO-ENTMCNC: 33.3 G/DL (ref 31.5–36.5)
MCHC RBC AUTO-ENTMCNC: 33.9 G/DL (ref 31.5–36.5)
MCV RBC AUTO: 91 FL (ref 78–100)
MCV RBC AUTO: 93 FL (ref 78–100)
MCV RBC AUTO: 96 FL (ref 78–100)
NUM BPU REQUESTED: 1
NUM BPU REQUESTED: 2
NUM BPU REQUESTED: 4
O2/TOTAL GAS SETTING VFR VENT: 100 %
O2/TOTAL GAS SETTING VFR VENT: 100 %
O2/TOTAL GAS SETTING VFR VENT: 40 %
O2/TOTAL GAS SETTING VFR VENT: 60 %
O2/TOTAL GAS SETTING VFR VENT: ABNORMAL %
OXYHGB MFR BLD: 97 % (ref 92–100)
OXYHGB MFR BLD: 97 % (ref 92–100)
OXYHGB MFR BLD: 98 % (ref 92–100)
OXYHGB MFR BLDV: 54 %
OXYHGB MFR BLDV: 58 %
OXYHGB MFR BLDV: 70 %
OXYHGB MFR BLDV: 71 %
OXYHGB MFR BLDV: 80 %
PCO2 BLD: 30 MM HG (ref 35–45)
PCO2 BLD: 31 MM HG (ref 35–45)
PCO2 BLD: 33 MM HG (ref 35–45)
PCO2 BLD: 38 MM HG (ref 35–45)
PCO2 BLD: 39 MM HG (ref 35–45)
PCO2 BLD: 41 MM HG (ref 35–45)
PCO2 BLD: 42 MM HG (ref 35–45)
PCO2 BLD: 44 MM HG (ref 35–45)
PCO2 BLDV: 30 MM HG (ref 40–50)
PCO2 BLDV: 36 MM HG (ref 40–50)
PCO2 BLDV: 40 MM HG (ref 40–50)
PCO2 BLDV: 40 MM HG (ref 40–50)
PCO2 BLDV: 41 MM HG (ref 40–50)
PH BLD: 7.34 PH (ref 7.35–7.45)
PH BLD: 7.37 PH (ref 7.35–7.45)
PH BLD: 7.38 PH (ref 7.35–7.45)
PH BLD: 7.39 PH (ref 7.35–7.45)
PH BLD: 7.4 PH (ref 7.35–7.45)
PH BLD: 7.41 PH (ref 7.35–7.45)
PH BLD: 7.43 PH (ref 7.35–7.45)
PH BLD: 7.44 PH (ref 7.35–7.45)
PH BLDV: 7.29 PH (ref 7.32–7.43)
PH BLDV: 7.32 PH (ref 7.32–7.43)
PH BLDV: 7.37 PH (ref 7.32–7.43)
PH BLDV: 7.37 PH (ref 7.32–7.43)
PH BLDV: 7.4 PH (ref 7.32–7.43)
PHOSPHATE SERPL-MCNC: 1.1 MG/DL (ref 2.5–4.5)
PHOSPHATE SERPL-MCNC: 2.2 MG/DL (ref 2.5–4.5)
PHOSPHATE SERPL-MCNC: 2.6 MG/DL (ref 2.5–4.5)
PHOSPHATE SERPL-MCNC: 2.7 MG/DL (ref 2.5–4.5)
PHOSPHATE SERPL-MCNC: 2.8 MG/DL (ref 2.5–4.5)
PLATELET # BLD AUTO: 140 10E9/L (ref 150–450)
PLATELET # BLD AUTO: 145 10E9/L (ref 150–450)
PLATELET # BLD AUTO: 153 10E9/L (ref 150–450)
PLATELET # BLD AUTO: 163 10E9/L (ref 150–450)
PLATELET # BLD AUTO: 191 10E9/L (ref 150–450)
PLATELET # BLD AUTO: 43 10E9/L (ref 150–450)
PO2 BLD: 134 MM HG (ref 80–105)
PO2 BLD: 141 MM HG (ref 80–105)
PO2 BLD: 149 MM HG (ref 80–105)
PO2 BLD: 153 MM HG (ref 80–105)
PO2 BLD: 163 MM HG (ref 80–105)
PO2 BLD: 176 MM HG (ref 80–105)
PO2 BLD: 211 MM HG (ref 80–105)
PO2 BLD: 306 MM HG (ref 80–105)
PO2 BLDV: 30 MM HG (ref 25–47)
PO2 BLDV: 34 MM HG (ref 25–47)
PO2 BLDV: 37 MM HG (ref 25–47)
PO2 BLDV: 39 MM HG (ref 25–47)
PO2 BLDV: 52 MM HG (ref 25–47)
POTASSIUM BLD-SCNC: 3.1 MMOL/L (ref 3.4–5.3)
POTASSIUM BLD-SCNC: 3.2 MMOL/L (ref 3.4–5.3)
POTASSIUM BLD-SCNC: 3.4 MMOL/L (ref 3.4–5.3)
POTASSIUM BLD-SCNC: 3.6 MMOL/L (ref 3.4–5.3)
POTASSIUM SERPL-SCNC: 2.8 MMOL/L (ref 3.4–5.3)
POTASSIUM SERPL-SCNC: 3.3 MMOL/L (ref 3.4–5.3)
POTASSIUM SERPL-SCNC: 3.9 MMOL/L (ref 3.4–5.3)
POTASSIUM SERPL-SCNC: 5.2 MMOL/L (ref 3.4–5.3)
POTASSIUM SERPL-SCNC: 5.5 MMOL/L (ref 3.4–5.3)
POTASSIUM SERPL-SCNC: 5.7 MMOL/L (ref 3.4–5.3)
PROT SERPL-MCNC: 5.5 G/DL (ref 6.8–8.8)
R TIME UNTIL CLOT FORMS: 5.8 MIN (ref 4–9)
R TIME UNTIL CLOT FORMS: 6.2 MIN (ref 4–9)
RBC # BLD AUTO: 2.45 10E12/L (ref 4.4–5.9)
RBC # BLD AUTO: 2.48 10E12/L (ref 4.4–5.9)
RBC # BLD AUTO: 2.75 10E12/L (ref 4.4–5.9)
RBC # BLD AUTO: 3.11 10E12/L (ref 4.4–5.9)
RBC # BLD AUTO: 3.22 10E12/L (ref 4.4–5.9)
SODIUM BLD-SCNC: 140 MMOL/L (ref 133–144)
SODIUM SERPL-SCNC: 140 MMOL/L (ref 133–144)
SODIUM SERPL-SCNC: 141 MMOL/L (ref 133–144)
SODIUM SERPL-SCNC: 142 MMOL/L (ref 133–144)
SPECIMEN EXP DATE BLD: NORMAL
TRANSFUSION STATUS PATIENT QL: NORMAL
WBC # BLD AUTO: 11.8 10E9/L (ref 4–11)
WBC # BLD AUTO: 12 10E9/L (ref 4–11)
WBC # BLD AUTO: 12.5 10E9/L (ref 4–11)
WBC # BLD AUTO: 17.7 10E9/L (ref 4–11)
WBC # BLD AUTO: 23.2 10E9/L (ref 4–11)

## 2020-07-20 PROCEDURE — 40000014 ZZH STATISTIC ARTERIAL MONITORING DAILY

## 2020-07-20 PROCEDURE — 44500 INTRO GASTROINTESTINAL TUBE: CPT

## 2020-07-20 PROCEDURE — 40000281 ZZH STATISTIC TRANSPORT TIME EA 15 MIN

## 2020-07-20 PROCEDURE — 82330 ASSAY OF CALCIUM: CPT | Performed by: SURGERY

## 2020-07-20 PROCEDURE — C9113 INJ PANTOPRAZOLE SODIUM, VIA: HCPCS | Performed by: SURGERY

## 2020-07-20 PROCEDURE — 25000132 ZZH RX MED GY IP 250 OP 250 PS 637: Performed by: PEDIATRICS

## 2020-07-20 PROCEDURE — 40000986 XR ABDOMEN PORT 1 VW

## 2020-07-20 PROCEDURE — 25800030 ZZH RX IP 258 OP 636: Performed by: SURGERY

## 2020-07-20 PROCEDURE — 83605 ASSAY OF LACTIC ACID: CPT | Performed by: SURGERY

## 2020-07-20 PROCEDURE — P9041 ALBUMIN (HUMAN),5%, 50ML: HCPCS | Performed by: SURGERY

## 2020-07-20 PROCEDURE — 93005 ELECTROCARDIOGRAM TRACING: CPT

## 2020-07-20 PROCEDURE — 93321 DOPPLER ECHO F-UP/LMTD STD: CPT | Mod: 26 | Performed by: INTERNAL MEDICINE

## 2020-07-20 PROCEDURE — 40000275 ZZH STATISTIC RCP TIME EA 10 MIN

## 2020-07-20 PROCEDURE — 82947 ASSAY GLUCOSE BLOOD QUANT: CPT

## 2020-07-20 PROCEDURE — 25000128 H RX IP 250 OP 636: Performed by: STUDENT IN AN ORGANIZED HEALTH CARE EDUCATION/TRAINING PROGRAM

## 2020-07-20 PROCEDURE — 99291 CRITICAL CARE FIRST HOUR: CPT | Mod: 25 | Performed by: INTERNAL MEDICINE

## 2020-07-20 PROCEDURE — 88300 SURGICAL PATH GROSS: CPT | Performed by: PEDIATRICS

## 2020-07-20 PROCEDURE — 40000986 XR CHEST PORT 1 VW

## 2020-07-20 PROCEDURE — P9016 RBC LEUKOCYTES REDUCED: HCPCS | Performed by: STUDENT IN AN ORGANIZED HEALTH CARE EDUCATION/TRAINING PROGRAM

## 2020-07-20 PROCEDURE — 85610 PROTHROMBIN TIME: CPT | Performed by: SURGERY

## 2020-07-20 PROCEDURE — 27210429 ZZH NUTRITION PRODUCT INTERMEDIATE LITER

## 2020-07-20 PROCEDURE — 25000128 H RX IP 250 OP 636: Performed by: ANESTHESIOLOGY

## 2020-07-20 PROCEDURE — 84295 ASSAY OF SERUM SODIUM: CPT

## 2020-07-20 PROCEDURE — 82803 BLOOD GASES ANY COMBINATION: CPT | Performed by: PEDIATRICS

## 2020-07-20 PROCEDURE — 80048 BASIC METABOLIC PNL TOTAL CA: CPT | Performed by: SURGERY

## 2020-07-20 PROCEDURE — 25000128 H RX IP 250 OP 636: Performed by: NURSE ANESTHETIST, CERTIFIED REGISTERED

## 2020-07-20 PROCEDURE — 84100 ASSAY OF PHOSPHORUS: CPT | Performed by: SURGERY

## 2020-07-20 PROCEDURE — 40000048 ZZH STATISTIC DAILY SWAN MONITORING

## 2020-07-20 PROCEDURE — 25800030 ZZH RX IP 258 OP 636: Performed by: PEDIATRICS

## 2020-07-20 PROCEDURE — 93321 DOPPLER ECHO F-UP/LMTD STD: CPT

## 2020-07-20 PROCEDURE — C9399 UNCLASSIFIED DRUGS OR BIOLOG: HCPCS

## 2020-07-20 PROCEDURE — 25000128 H RX IP 250 OP 636

## 2020-07-20 PROCEDURE — 82803 BLOOD GASES ANY COMBINATION: CPT | Performed by: SURGERY

## 2020-07-20 PROCEDURE — 84132 ASSAY OF SERUM POTASSIUM: CPT | Performed by: PEDIATRICS

## 2020-07-20 PROCEDURE — 25000132 ZZH RX MED GY IP 250 OP 250 PS 637: Performed by: SURGERY

## 2020-07-20 PROCEDURE — 25000125 ZZHC RX 250: Performed by: NURSE ANESTHETIST, CERTIFIED REGISTERED

## 2020-07-20 PROCEDURE — 25000131 ZZH RX MED GY IP 250 OP 636 PS 637: Performed by: STUDENT IN AN ORGANIZED HEALTH CARE EDUCATION/TRAINING PROGRAM

## 2020-07-20 PROCEDURE — 93010 ELECTROCARDIOGRAM REPORT: CPT | Performed by: INTERNAL MEDICINE

## 2020-07-20 PROCEDURE — 20000004 ZZH R&B ICU UMMC

## 2020-07-20 PROCEDURE — 82330 ASSAY OF CALCIUM: CPT

## 2020-07-20 PROCEDURE — 25000131 ZZH RX MED GY IP 250 OP 636 PS 637: Performed by: ANESTHESIOLOGY

## 2020-07-20 PROCEDURE — 40000985 XR CHEST PORT 1 VW

## 2020-07-20 PROCEDURE — 25000125 ZZHC RX 250: Performed by: PEDIATRICS

## 2020-07-20 PROCEDURE — 25000132 ZZH RX MED GY IP 250 OP 250 PS 637: Performed by: STUDENT IN AN ORGANIZED HEALTH CARE EDUCATION/TRAINING PROGRAM

## 2020-07-20 PROCEDURE — 82803 BLOOD GASES ANY COMBINATION: CPT

## 2020-07-20 PROCEDURE — 85027 COMPLETE CBC AUTOMATED: CPT | Performed by: SURGERY

## 2020-07-20 PROCEDURE — 40000076 ZZH STATISTIC IABP MONITORING

## 2020-07-20 PROCEDURE — 93308 TTE F-UP OR LMTD: CPT | Mod: 26 | Performed by: INTERNAL MEDICINE

## 2020-07-20 PROCEDURE — 83605 ASSAY OF LACTIC ACID: CPT | Performed by: STUDENT IN AN ORGANIZED HEALTH CARE EDUCATION/TRAINING PROGRAM

## 2020-07-20 PROCEDURE — 85396 CLOTTING ASSAY WHOLE BLOOD: CPT | Performed by: INTERNAL MEDICINE

## 2020-07-20 PROCEDURE — P9037 PLATE PHERES LEUKOREDU IRRAD: HCPCS | Performed by: INTERNAL MEDICINE

## 2020-07-20 PROCEDURE — 84132 ASSAY OF SERUM POTASSIUM: CPT | Performed by: SURGERY

## 2020-07-20 PROCEDURE — 25000125 ZZHC RX 250: Performed by: SURGERY

## 2020-07-20 PROCEDURE — 85730 THROMBOPLASTIN TIME PARTIAL: CPT | Performed by: INTERNAL MEDICINE

## 2020-07-20 PROCEDURE — 00000146 ZZHCL STATISTIC GLUCOSE BY METER IP

## 2020-07-20 PROCEDURE — 94799 UNLISTED PULMONARY SVC/PX: CPT

## 2020-07-20 PROCEDURE — 84132 ASSAY OF SERUM POTASSIUM: CPT | Performed by: STUDENT IN AN ORGANIZED HEALTH CARE EDUCATION/TRAINING PROGRAM

## 2020-07-20 PROCEDURE — 85610 PROTHROMBIN TIME: CPT | Performed by: INTERNAL MEDICINE

## 2020-07-20 PROCEDURE — 25000128 H RX IP 250 OP 636: Performed by: SURGERY

## 2020-07-20 PROCEDURE — 83735 ASSAY OF MAGNESIUM: CPT | Performed by: SURGERY

## 2020-07-20 PROCEDURE — 85049 AUTOMATED PLATELET COUNT: CPT | Performed by: INTERNAL MEDICINE

## 2020-07-20 PROCEDURE — 83605 ASSAY OF LACTIC ACID: CPT | Performed by: PEDIATRICS

## 2020-07-20 PROCEDURE — 82330 ASSAY OF CALCIUM: CPT | Performed by: PEDIATRICS

## 2020-07-20 PROCEDURE — 25000128 H RX IP 250 OP 636: Performed by: PEDIATRICS

## 2020-07-20 PROCEDURE — 25800030 ZZH RX IP 258 OP 636: Performed by: STUDENT IN AN ORGANIZED HEALTH CARE EDUCATION/TRAINING PROGRAM

## 2020-07-20 PROCEDURE — 85384 FIBRINOGEN ACTIVITY: CPT | Performed by: SURGERY

## 2020-07-20 PROCEDURE — P9041 ALBUMIN (HUMAN),5%, 50ML: HCPCS

## 2020-07-20 PROCEDURE — 40000196 ZZH STATISTIC RAPCV CVP MONITORING

## 2020-07-20 PROCEDURE — 80053 COMPREHEN METABOLIC PANEL: CPT | Performed by: SURGERY

## 2020-07-20 PROCEDURE — 82805 BLOOD GASES W/O2 SATURATION: CPT | Performed by: STUDENT IN AN ORGANIZED HEALTH CARE EDUCATION/TRAINING PROGRAM

## 2020-07-20 PROCEDURE — 83605 ASSAY OF LACTIC ACID: CPT

## 2020-07-20 PROCEDURE — 84132 ASSAY OF SERUM POTASSIUM: CPT

## 2020-07-20 PROCEDURE — 85384 FIBRINOGEN ACTIVITY: CPT | Performed by: INTERNAL MEDICINE

## 2020-07-20 PROCEDURE — P9037 PLATE PHERES LEUKOREDU IRRAD: HCPCS | Performed by: SURGERY

## 2020-07-20 PROCEDURE — 82805 BLOOD GASES W/O2 SATURATION: CPT | Performed by: SURGERY

## 2020-07-20 PROCEDURE — 25000125 ZZHC RX 250: Performed by: STUDENT IN AN ORGANIZED HEALTH CARE EDUCATION/TRAINING PROGRAM

## 2020-07-20 PROCEDURE — 25800030 ZZH RX IP 258 OP 636: Performed by: NURSE ANESTHETIST, CERTIFIED REGISTERED

## 2020-07-20 PROCEDURE — 93325 DOPPLER ECHO COLOR FLOW MAPG: CPT | Mod: 26 | Performed by: INTERNAL MEDICINE

## 2020-07-20 PROCEDURE — 94002 VENT MGMT INPAT INIT DAY: CPT

## 2020-07-20 PROCEDURE — 85730 THROMBOPLASTIN TIME PARTIAL: CPT | Performed by: SURGERY

## 2020-07-20 PROCEDURE — 88309 TISSUE EXAM BY PATHOLOGIST: CPT | Performed by: PEDIATRICS

## 2020-07-20 PROCEDURE — 82805 BLOOD GASES W/O2 SATURATION: CPT | Performed by: PEDIATRICS

## 2020-07-20 RX ORDER — CEFEPIME HYDROCHLORIDE 1 G/1
1 INJECTION, POWDER, FOR SOLUTION INTRAMUSCULAR; INTRAVENOUS ONCE
Status: COMPLETED | OUTPATIENT
Start: 2020-07-20 | End: 2020-07-20

## 2020-07-20 RX ORDER — NYSTATIN 100000/ML
1000000 SUSPENSION, ORAL (FINAL DOSE FORM) ORAL 4 TIMES DAILY
Status: DISCONTINUED | OUTPATIENT
Start: 2020-07-20 | End: 2020-08-01

## 2020-07-20 RX ORDER — GABAPENTIN 100 MG/1
100 CAPSULE ORAL DAILY
Status: DISCONTINUED | OUTPATIENT
Start: 2020-07-20 | End: 2020-07-20

## 2020-07-20 RX ORDER — ALBUMIN, HUMAN INJ 5% 5 %
12.5 SOLUTION INTRAVENOUS ONCE
Status: COMPLETED | OUTPATIENT
Start: 2020-07-20 | End: 2020-07-20

## 2020-07-20 RX ORDER — NOREPINEPHRINE BITARTRATE 0.06 MG/ML
0.03-0.4 INJECTION, SOLUTION INTRAVENOUS CONTINUOUS
Status: DISCONTINUED | OUTPATIENT
Start: 2020-07-20 | End: 2020-07-24

## 2020-07-20 RX ORDER — PREDNISONE 20 MG/1
20 TABLET ORAL 2 TIMES DAILY
Status: DISCONTINUED | OUTPATIENT
Start: 2020-07-29 | End: 2020-08-03 | Stop reason: HOSPADM

## 2020-07-20 RX ORDER — DIPHENHYDRAMINE HYDROCHLORIDE 50 MG/ML
12.5 INJECTION INTRAMUSCULAR; INTRAVENOUS EVERY 6 HOURS PRN
Status: DISCONTINUED | OUTPATIENT
Start: 2020-07-20 | End: 2020-07-20

## 2020-07-20 RX ORDER — FUROSEMIDE 10 MG/ML
20 INJECTION INTRAMUSCULAR; INTRAVENOUS ONCE
Status: COMPLETED | OUTPATIENT
Start: 2020-07-20 | End: 2020-07-20

## 2020-07-20 RX ORDER — PROCHLORPERAZINE MALEATE 5 MG
5 TABLET ORAL EVERY 6 HOURS PRN
Status: DISCONTINUED | OUTPATIENT
Start: 2020-07-20 | End: 2020-08-03 | Stop reason: HOSPADM

## 2020-07-20 RX ORDER — OXYCODONE HYDROCHLORIDE 5 MG/1
5 TABLET ORAL
Status: DISCONTINUED | OUTPATIENT
Start: 2020-07-20 | End: 2020-07-20

## 2020-07-20 RX ORDER — PROPOFOL 10 MG/ML
5-75 INJECTION, EMULSION INTRAVENOUS CONTINUOUS
Status: DISCONTINUED | OUTPATIENT
Start: 2020-07-20 | End: 2020-07-23

## 2020-07-20 RX ORDER — DEXTROSE MONOHYDRATE 100 MG/ML
INJECTION, SOLUTION INTRAVENOUS CONTINUOUS PRN
Status: DISCONTINUED | OUTPATIENT
Start: 2020-07-20 | End: 2020-07-25

## 2020-07-20 RX ORDER — ONDANSETRON 2 MG/ML
4 INJECTION INTRAMUSCULAR; INTRAVENOUS EVERY 6 HOURS PRN
Status: DISCONTINUED | OUTPATIENT
Start: 2020-07-20 | End: 2020-08-03 | Stop reason: HOSPADM

## 2020-07-20 RX ORDER — LIDOCAINE HYDROCHLORIDE 20 MG/ML
5 SOLUTION OROPHARYNGEAL ONCE
Status: COMPLETED | OUTPATIENT
Start: 2020-07-20 | End: 2020-07-20

## 2020-07-20 RX ORDER — ACETAMINOPHEN 325 MG/1
975 TABLET ORAL EVERY 8 HOURS
Status: DISCONTINUED | OUTPATIENT
Start: 2020-07-20 | End: 2020-07-20

## 2020-07-20 RX ORDER — DIPHENHYDRAMINE HYDROCHLORIDE 50 MG/ML
12.5 INJECTION INTRAMUSCULAR; INTRAVENOUS EVERY 6 HOURS PRN
Status: DISCONTINUED | OUTPATIENT
Start: 2020-07-20 | End: 2020-08-03 | Stop reason: HOSPADM

## 2020-07-20 RX ORDER — AMOXICILLIN 250 MG
1 CAPSULE ORAL 2 TIMES DAILY
Status: DISCONTINUED | OUTPATIENT
Start: 2020-07-20 | End: 2020-08-03 | Stop reason: HOSPADM

## 2020-07-20 RX ORDER — DIPHENHYDRAMINE HCL 12.5MG/5ML
12.5 LIQUID (ML) ORAL EVERY 6 HOURS PRN
Status: DISCONTINUED | OUTPATIENT
Start: 2020-07-20 | End: 2020-07-20

## 2020-07-20 RX ORDER — DEXTROSE MONOHYDRATE 25 G/50ML
25-50 INJECTION, SOLUTION INTRAVENOUS
Status: DISCONTINUED | OUTPATIENT
Start: 2020-07-20 | End: 2020-07-20

## 2020-07-20 RX ORDER — LIDOCAINE 40 MG/G
CREAM TOPICAL
Status: DISCONTINUED | OUTPATIENT
Start: 2020-07-20 | End: 2020-08-03 | Stop reason: HOSPADM

## 2020-07-20 RX ORDER — METHOCARBAMOL 500 MG/1
500 TABLET, FILM COATED ORAL 4 TIMES DAILY PRN
Status: DISCONTINUED | OUTPATIENT
Start: 2020-07-20 | End: 2020-07-20

## 2020-07-20 RX ORDER — AMOXICILLIN 250 MG
2 CAPSULE ORAL 2 TIMES DAILY
Status: DISCONTINUED | OUTPATIENT
Start: 2020-07-20 | End: 2020-08-03 | Stop reason: HOSPADM

## 2020-07-20 RX ORDER — PREDNISONE 20 MG/1
40 TABLET ORAL 2 TIMES DAILY
Status: COMPLETED | OUTPATIENT
Start: 2020-07-21 | End: 2020-07-22

## 2020-07-20 RX ORDER — PROTAMINE SULFATE 10 MG/ML
INJECTION, SOLUTION INTRAVENOUS PRN
Status: DISCONTINUED | OUTPATIENT
Start: 2020-07-20 | End: 2020-07-20

## 2020-07-20 RX ORDER — POTASSIUM CHLORIDE 29.8 MG/ML
INJECTION INTRAVENOUS PRN
Status: DISCONTINUED | OUTPATIENT
Start: 2020-07-20 | End: 2020-07-20

## 2020-07-20 RX ORDER — NALOXONE HYDROCHLORIDE 0.4 MG/ML
.1-.4 INJECTION, SOLUTION INTRAMUSCULAR; INTRAVENOUS; SUBCUTANEOUS
Status: DISCONTINUED | OUTPATIENT
Start: 2020-07-20 | End: 2020-07-23

## 2020-07-20 RX ORDER — ALBUMIN, HUMAN INJ 5% 5 %
SOLUTION INTRAVENOUS
Status: COMPLETED
Start: 2020-07-20 | End: 2020-07-20

## 2020-07-20 RX ORDER — HYDROMORPHONE HCL/0.9% NACL/PF 0.2MG/0.2
0.2 SYRINGE (ML) INTRAVENOUS
Status: DISCONTINUED | OUTPATIENT
Start: 2020-07-20 | End: 2020-08-03 | Stop reason: HOSPADM

## 2020-07-20 RX ORDER — SULFAMETHOXAZOLE AND TRIMETHOPRIM 200; 40 MG/5ML; MG/5ML
10 SUSPENSION ORAL DAILY
Status: DISCONTINUED | OUTPATIENT
Start: 2020-07-20 | End: 2020-07-20

## 2020-07-20 RX ORDER — DIPHENHYDRAMINE HCL 12.5MG/5ML
12.5 LIQUID (ML) ORAL EVERY 6 HOURS PRN
Status: DISCONTINUED | OUTPATIENT
Start: 2020-07-20 | End: 2020-08-03 | Stop reason: HOSPADM

## 2020-07-20 RX ORDER — DEXTROSE MONOHYDRATE 100 MG/ML
INJECTION, SOLUTION INTRAVENOUS CONTINUOUS PRN
Status: DISCONTINUED | OUTPATIENT
Start: 2020-07-20 | End: 2020-08-03 | Stop reason: HOSPADM

## 2020-07-20 RX ORDER — ACETAMINOPHEN 325 MG/1
650 TABLET ORAL EVERY 4 HOURS PRN
Status: DISCONTINUED | OUTPATIENT
Start: 2020-07-23 | End: 2020-07-20 | Stop reason: CLARIF

## 2020-07-20 RX ORDER — NICOTINE POLACRILEX 4 MG
15-30 LOZENGE BUCCAL
Status: DISCONTINUED | OUTPATIENT
Start: 2020-07-20 | End: 2020-07-20

## 2020-07-20 RX ORDER — GABAPENTIN 100 MG/1
100 CAPSULE ORAL DAILY
Status: COMPLETED | OUTPATIENT
Start: 2020-07-21 | End: 2020-07-22

## 2020-07-20 RX ORDER — METHYLPREDNISOLONE SODIUM SUCCINATE 500 MG/8ML
INJECTION INTRAMUSCULAR; INTRAVENOUS PRN
Status: DISCONTINUED | OUTPATIENT
Start: 2020-07-20 | End: 2020-07-20

## 2020-07-20 RX ORDER — OXYCODONE HYDROCHLORIDE 5 MG/1
5 TABLET ORAL
Status: DISCONTINUED | OUTPATIENT
Start: 2020-07-20 | End: 2020-08-03 | Stop reason: HOSPADM

## 2020-07-20 RX ORDER — ACETAMINOPHEN 325 MG/1
975 TABLET ORAL EVERY 8 HOURS
Status: COMPLETED | OUTPATIENT
Start: 2020-07-20 | End: 2020-07-22

## 2020-07-20 RX ORDER — METHOCARBAMOL 500 MG/1
500 TABLET, FILM COATED ORAL 4 TIMES DAILY PRN
Status: DISCONTINUED | OUTPATIENT
Start: 2020-07-20 | End: 2020-08-03 | Stop reason: HOSPADM

## 2020-07-20 RX ORDER — SULFAMETHOXAZOLE AND TRIMETHOPRIM 400; 80 MG/1; MG/1
1 TABLET ORAL DAILY
Status: DISCONTINUED | OUTPATIENT
Start: 2020-07-20 | End: 2020-07-20

## 2020-07-20 RX ORDER — METHYLPREDNISOLONE SODIUM SUCCINATE 125 MG/2ML
125 INJECTION, POWDER, LYOPHILIZED, FOR SOLUTION INTRAMUSCULAR; INTRAVENOUS EVERY 8 HOURS
Status: COMPLETED | OUTPATIENT
Start: 2020-07-20 | End: 2020-07-21

## 2020-07-20 RX ORDER — ONDANSETRON 4 MG/1
4 TABLET, ORALLY DISINTEGRATING ORAL EVERY 6 HOURS PRN
Status: DISCONTINUED | OUTPATIENT
Start: 2020-07-20 | End: 2020-08-03 | Stop reason: HOSPADM

## 2020-07-20 RX ORDER — CEFAZOLIN SODIUM 1 G/3ML
1 INJECTION, POWDER, FOR SOLUTION INTRAMUSCULAR; INTRAVENOUS EVERY 8 HOURS
Status: DISCONTINUED | OUTPATIENT
Start: 2020-07-20 | End: 2020-07-20

## 2020-07-20 RX ADMIN — FENTANYL CITRATE 50 MCG: 50 INJECTION, SOLUTION INTRAMUSCULAR; INTRAVENOUS at 01:23

## 2020-07-20 RX ADMIN — FUROSEMIDE 20 MG: 10 INJECTION, SOLUTION INTRAVENOUS at 21:26

## 2020-07-20 RX ADMIN — VASOPRESSIN 3 UNITS/HR: 20 INJECTION INTRAVENOUS at 03:34

## 2020-07-20 RX ADMIN — EPINEPHRINE 0.05 MCG/KG/MIN: 1 INJECTION PARENTERAL at 03:36

## 2020-07-20 RX ADMIN — INSULIN GLARGINE 40 UNITS: 100 INJECTION, SOLUTION SUBCUTANEOUS at 10:03

## 2020-07-20 RX ADMIN — PROPOFOL 25 MCG/KG/MIN: 10 INJECTION, EMULSION INTRAVENOUS at 15:46

## 2020-07-20 RX ADMIN — POTASSIUM CHLORIDE 20 MEQ: 29.8 INJECTION, SOLUTION INTRAVENOUS at 13:39

## 2020-07-20 RX ADMIN — PROPOFOL 25 MCG/KG/MIN: 10 INJECTION, EMULSION INTRAVENOUS at 03:37

## 2020-07-20 RX ADMIN — HUMAN INSULIN 40 UNITS/HR: 100 INJECTION, SOLUTION SUBCUTANEOUS at 11:26

## 2020-07-20 RX ADMIN — ACETAMINOPHEN 975 MG: 325 TABLET, FILM COATED ORAL at 20:49

## 2020-07-20 RX ADMIN — HUMAN INSULIN 40 UNITS/HR: 100 INJECTION, SOLUTION SUBCUTANEOUS at 09:32

## 2020-07-20 RX ADMIN — POTASSIUM CHLORIDE 20 MEQ: 1.5 POWDER, FOR SOLUTION ORAL at 11:37

## 2020-07-20 RX ADMIN — HUMAN INSULIN 50 UNITS/HR: 100 INJECTION, SOLUTION SUBCUTANEOUS at 06:51

## 2020-07-20 RX ADMIN — POTASSIUM CHLORIDE 40 MEQ: 1.5 POWDER, FOR SOLUTION ORAL at 09:04

## 2020-07-20 RX ADMIN — HUMAN INSULIN 45 UNITS/HR: 100 INJECTION, SOLUTION SUBCUTANEOUS at 04:52

## 2020-07-20 RX ADMIN — HUMAN INSULIN 40 UNITS/HR: 100 INJECTION, SOLUTION SUBCUTANEOUS at 08:32

## 2020-07-20 RX ADMIN — PANTOPRAZOLE SODIUM 40 MG: 40 INJECTION, POWDER, FOR SOLUTION INTRAVENOUS at 07:32

## 2020-07-20 RX ADMIN — HUMAN INSULIN 6 UNITS/HR: 100 INJECTION, SOLUTION SUBCUTANEOUS at 20:12

## 2020-07-20 RX ADMIN — EPINEPHRINE 10 MCG: 1 INJECTION PARENTERAL at 00:41

## 2020-07-20 RX ADMIN — ISOPROTERENOL HYDROCHLORIDE 0.01 MCG/KG/MIN: 0.2 INJECTION, SOLUTION INTRAMUSCULAR; INTRAVENOUS at 05:46

## 2020-07-20 RX ADMIN — NYSTATIN 1000000 UNITS: 100000 SUSPENSION ORAL at 11:07

## 2020-07-20 RX ADMIN — Medication 0.5 UNITS: at 00:08

## 2020-07-20 RX ADMIN — POTASSIUM CHLORIDE 20 MEQ: 400 INJECTION, SOLUTION INTRAVENOUS at 00:31

## 2020-07-20 RX ADMIN — Medication 0.5 UNITS: at 00:13

## 2020-07-20 RX ADMIN — HEPARIN SODIUM 200 ML: 1000 INJECTION INTRAVENOUS; SUBCUTANEOUS at 01:43

## 2020-07-20 RX ADMIN — MAGNESIUM SULFATE IN WATER 2 G: 40 INJECTION, SOLUTION INTRAVENOUS at 03:12

## 2020-07-20 RX ADMIN — ISOPROTERENOL HYDROCHLORIDE 0.03 MCG/KG/MIN: 0.2 INJECTION, SOLUTION INTRAMUSCULAR; INTRAVENOUS at 03:33

## 2020-07-20 RX ADMIN — FENTANYL CITRATE 150 MCG: 50 INJECTION, SOLUTION INTRAMUSCULAR; INTRAVENOUS at 00:03

## 2020-07-20 RX ADMIN — HUMAN INSULIN 30 UNITS/HR: 100 INJECTION, SOLUTION SUBCUTANEOUS at 13:29

## 2020-07-20 RX ADMIN — FENTANYL CITRATE 50 MCG: 50 INJECTION, SOLUTION INTRAMUSCULAR; INTRAVENOUS at 00:45

## 2020-07-20 RX ADMIN — ALBUMIN HUMAN 12.5 G: 50 SOLUTION INTRAVENOUS at 03:51

## 2020-07-20 RX ADMIN — MIDAZOLAM HYDROCHLORIDE 6 MG: 1 INJECTION, SOLUTION INTRAMUSCULAR; INTRAVENOUS at 00:09

## 2020-07-20 RX ADMIN — STANDARDIZED SENNA CONCENTRATE AND DOCUSATE SODIUM 2 TABLET: 8.6; 5 TABLET ORAL at 20:50

## 2020-07-20 RX ADMIN — POTASSIUM CHLORIDE 20 MEQ: 29.8 INJECTION, SOLUTION INTRAVENOUS at 06:09

## 2020-07-20 RX ADMIN — ALBUMIN HUMAN 12.5 G: 0.05 INJECTION, SOLUTION INTRAVENOUS at 05:23

## 2020-07-20 RX ADMIN — Medication 1050 MG: at 02:18

## 2020-07-20 RX ADMIN — LIDOCAINE HYDROCHLORIDE 4 ML: 20 SOLUTION ORAL; TOPICAL at 14:02

## 2020-07-20 RX ADMIN — NYSTATIN 1000000 UNITS: 100000 SUSPENSION ORAL at 15:46

## 2020-07-20 RX ADMIN — NYSTATIN 1000000 UNITS: 100000 SUSPENSION ORAL at 07:32

## 2020-07-20 RX ADMIN — HUMAN INSULIN 15 UNITS/HR: 100 INJECTION, SOLUTION SUBCUTANEOUS at 15:46

## 2020-07-20 RX ADMIN — POTASSIUM CHLORIDE 40 MEQ: 1.5 POWDER, FOR SOLUTION ORAL at 06:10

## 2020-07-20 RX ADMIN — MYCOPHENOLATE MOFETIL 1500 MG: 500 INJECTION, POWDER, LYOPHILIZED, FOR SOLUTION INTRAVENOUS at 07:33

## 2020-07-20 RX ADMIN — MYCOPHENOLATE MOFETIL 1500 MG: 500 INJECTION, POWDER, LYOPHILIZED, FOR SOLUTION INTRAVENOUS at 20:16

## 2020-07-20 RX ADMIN — VASOPRESSIN 4 UNITS/HR: 20 INJECTION INTRAVENOUS at 07:34

## 2020-07-20 RX ADMIN — METHYLPREDNISOLONE SODIUM SUCCINATE 125 MG: 125 INJECTION, POWDER, FOR SOLUTION INTRAMUSCULAR; INTRAVENOUS at 07:32

## 2020-07-20 RX ADMIN — Medication 0.01 MCG/KG/MIN: at 03:50

## 2020-07-20 RX ADMIN — PROPOFOL 15 MCG/KG/MIN: 10 INJECTION, EMULSION INTRAVENOUS at 11:07

## 2020-07-20 RX ADMIN — METHYLPREDNISOLONE SODIUM SUCCINATE 125 MG: 125 INJECTION, POWDER, FOR SOLUTION INTRAMUSCULAR; INTRAVENOUS at 15:46

## 2020-07-20 RX ADMIN — MULTIVITAMIN 15 ML: LIQUID ORAL at 15:46

## 2020-07-20 RX ADMIN — POTASSIUM CHLORIDE 20 MEQ: 29.8 INJECTION, SOLUTION INTRAVENOUS at 03:12

## 2020-07-20 RX ADMIN — POTASSIUM CHLORIDE 40 MEQ: 1.5 POWDER, FOR SOLUTION ORAL at 03:11

## 2020-07-20 RX ADMIN — VASOPRESSIN 1.2 UNITS/HR: 20 INJECTION INTRAVENOUS at 00:43

## 2020-07-20 RX ADMIN — HUMAN INSULIN 40 UNITS/HR: 100 INJECTION, SOLUTION SUBCUTANEOUS at 07:34

## 2020-07-20 RX ADMIN — POTASSIUM CHLORIDE 20 MEQ: 1.5 POWDER, FOR SOLUTION ORAL at 13:39

## 2020-07-20 RX ADMIN — HUMAN INSULIN 40 UNITS/HR: 100 INJECTION, SOLUTION SUBCUTANEOUS at 11:07

## 2020-07-20 RX ADMIN — ALBUMIN HUMAN 12.5 G: 0.05 INJECTION, SOLUTION INTRAVENOUS at 03:51

## 2020-07-20 RX ADMIN — CEFEPIME HYDROCHLORIDE 2 G: 2 INJECTION, POWDER, FOR SOLUTION INTRAVENOUS at 16:15

## 2020-07-20 RX ADMIN — CEFAZOLIN 1000 ML GIVEN: 1 INJECTION, POWDER, FOR SOLUTION INTRAMUSCULAR; INTRAVENOUS at 01:43

## 2020-07-20 RX ADMIN — PROTAMINE SULFATE 200 MG: 10 INJECTION, SOLUTION INTRAVENOUS at 00:03

## 2020-07-20 RX ADMIN — METHYLPREDNISOLONE SODIUM SUCCINATE 500 MG: 500 INJECTION, POWDER, FOR SOLUTION INTRAMUSCULAR; INTRAVENOUS at 00:08

## 2020-07-20 RX ADMIN — Medication 100 MCG/HR: at 04:02

## 2020-07-20 RX ADMIN — HUMAN INSULIN 45 UNITS/HR: 100 INJECTION, SOLUTION SUBCUTANEOUS at 03:38

## 2020-07-20 RX ADMIN — SODIUM CHLORIDE 1000 ML: 900 IRRIGANT IRRIGATION at 01:43

## 2020-07-20 RX ADMIN — SODIUM BICARBONATE 100 MEQ: 84 INJECTION, SOLUTION INTRAVENOUS at 05:23

## 2020-07-20 RX ADMIN — CEFEPIME HYDROCHLORIDE 1 G: 1 INJECTION, POWDER, FOR SOLUTION INTRAMUSCULAR; INTRAVENOUS at 04:53

## 2020-07-20 RX ADMIN — STANDARDIZED SENNA CONCENTRATE AND DOCUSATE SODIUM 2 TABLET: 8.6; 5 TABLET ORAL at 07:32

## 2020-07-20 RX ADMIN — SODIUM CHLORIDE, POTASSIUM CHLORIDE, SODIUM LACTATE AND CALCIUM CHLORIDE 500 ML: 600; 310; 30; 20 INJECTION, SOLUTION INTRAVENOUS at 18:00

## 2020-07-20 RX ADMIN — NYSTATIN 1000000 UNITS: 100000 SUSPENSION ORAL at 20:50

## 2020-07-20 RX ADMIN — INSULIN HUMAN 10 UNITS: 100 INJECTION, SOLUTION PARENTERAL at 00:31

## 2020-07-20 RX ADMIN — GABAPENTIN 100 MG: 100 CAPSULE ORAL at 07:32

## 2020-07-20 RX ADMIN — POTASSIUM CHLORIDE 20 MEQ: 29.8 INJECTION, SOLUTION INTRAVENOUS at 03:43

## 2020-07-20 RX ADMIN — Medication 0.5 UNITS: at 00:39

## 2020-07-20 RX ADMIN — EPINEPHRINE 0.05 MCG/KG/MIN: 1 INJECTION PARENTERAL at 18:43

## 2020-07-20 RX ADMIN — HUMAN INSULIN 50 UNITS/HR: 100 INJECTION, SOLUTION SUBCUTANEOUS at 05:44

## 2020-07-20 RX ADMIN — VANCOMYCIN HYDROCHLORIDE 1500 MG: 10 INJECTION, POWDER, LYOPHILIZED, FOR SOLUTION INTRAVENOUS at 08:18

## 2020-07-20 ASSESSMENT — ACTIVITIES OF DAILY LIVING (ADL)
ADLS_ACUITY_SCORE: 15
ADLS_ACUITY_SCORE: 14

## 2020-07-20 NOTE — PROGRESS NOTES
Donor Culture Results:    Preliminary positive donor blood culture results have been uploaded into UNOS. Donor ID [id number].  Dr. Terrazas notified of results.

## 2020-07-20 NOTE — ANESTHESIA PROCEDURE NOTES
PA Catheter Insertion Note    Staff -   Anesthesiologist:  Warren Sellers MD      Performed By: anesthesiologist          Introducer: Introducer placed as part of procedure (SEE separate note)   Skin prep:  Chloraprep Cap, Full body drape, hand hygiene, Mask, Sterile gloves and Sterile gown    PA Catheter type:  CCO        Appropriate RA, RV, PA  waveforms?: Yes    Dressing:  Biopatch    Complications:  None apparent

## 2020-07-20 NOTE — PROGRESS NOTES
[facility]   Cardiology II Service / Advanced Heart Failure  Progress note    Lucian Morillo  : 1955  MRN # [id number]    ADMIT DATE: 7/3/2020    Changes today  - TTE today w/ LVEF 20-25%, RV severely reduced   - per surgery, will keep both Epi and NE at or above 0.04  - hold off on weaning Lalito given reduced RV function     - Follow up CORRINE (will result  in PM at earliest, no additional PLEX indicated per transfusion medicine, will consider starting argatroban or bival  if safe from surgical standpoint)     ASSESSMENT & PLAN:  Lucian Henderson Sr. is a 64 year old male with a PMH of HTN, DMII, obesity, CKD, CHANTEL, ischemic cardiomyopathy and severe LV systolic dysfunction s/p 3V CABG () and primary prevention ICD ( 4/2/15). Currently he presents with 3 days of worsening fatigue and SOB with minimal exertion. Patient claims he could walk 1-2 blocks last week but has been barely able to ambulate home recently.     Admitted for possible LVAD/Transplant work up. IABP and listed status 2 for heart transplant . HIT + , underwent PLEX, s/p . Post operative course complicated by graft dysfunction and VT.     Date RA MPAP PCWP SV02 Jacy CO Jacy CI MAP SVR PVR Intervention   20 8 50 38 49 2.3 1.28 89 2400 5.2    20  6 48 28 35 4.0 2.1 69 1300 2.2 Nipride, PO afterload reduction    7/10 9 56/34 32 68 5.1 2.7 68 900 1.8 Nipride 1, PO afterload reduction    14 65/42/50 36 60 4.3 2.2 89 1100 1.8 Nipride 0.25, PO afterload reduction    7/15 8 52/28/36 24 66 4.1 2.2 100 1200 2.9 IABP 1:1, PO afterload reduction     7 50/24/33 24 70 5.6 3.0 94 1200 1.6 IABP 1:1, Dopamine 2.5    9 56/24/35 22 62 4.8 2.6 75 1100 2.7 IABP 1:1    15  16 58 5.2 2.8 61 746 1.9 IABP 1:1, Epi 0.05, NE 0.03, Vas       # ICM s/p 3V CABG () and primary prevention ICD ( 4/2/15)  # HFrEF Stage D, NYHA III-IV s/p OHT   # Ambulatory cardiogenic shock  #  "Arrhythmia: HF associated VT/VF, VT post op from OHT   # Graft dysfunction (EF 20-25%)   Graft Function: TTE POD#1 w/ LVEF 20-25%, RV severely reduced, suspect 2/2 to ischemic time   --Volume: slightly volume up (goal CVP 10-12)  --BP: MAP goal >65   --HR: Goal >100,   -->isopreterenol at 0.01 (can add terbutaline 10 mg PO Q6H to wean in coming days)   -->temporary pacer set at  given VT     Pressors/Inotropes:  -continue NE, epi and vaso, minimal weaning today    -NO at 20, possible wean tomorrow     Immunosuppression:  -s/p Simulect on POD#0, second dose POD#4   -MMF 1500 mg BID  -s/p solumedrol 125 mg q8h  --> transitioned to prednisone  w/ taper   -will start Tacro later   -routine endomyocardial bx 7 days post transplant (7/27)      Antibiotics:   - continue vancomycin (will get 3 days)  - continue cefepime (if pt came from home w)  - Nystatin Swish&Swallow  - CMV: R pending / D+  - EBV: R pending / D+  - f/u blood cx   - donor blood cx pending     # Atrial Flutter with RVR  Patient went into Atrial flutter with RVR with rates around 150 overnight on 7/7/2020 at around 2 am. Converted to NSR with amiodarone, which was stopped 7/19 given OHT.     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 Terrazas.     Héctor Smith MD   Cardiology Fellow, PGY-5  [phone]   ..........................................................................................................................................................  Subjective:  NSVT overnight and this AM. Lactate improving. Pressors stable.       PHYSICAL EXAM:  BP (!) 153/50   Pulse 80   Temp 97.3  F (36.3  C) (Pulmonary Artery)   Resp 14   Ht 1.626 m (5' 4\")   Wt 75.4 kg (166 lb 3.2 oz)   SpO2 100%   BMI 28.53 kg/m    GENERAL: Intubated and sedated   NECK: Supple and " without lymphadenopathy. JVP 11 cm  CV: S1/S2 heard without murmur   RESPIRATORY: CTAB  GI: Soft and distended. No tenderness, rebound, guarding. No palpable organomegaly.   EXTREMITIES: Peripheral edema trace.   NEUROLOGIC: Intubated and sedated.   MUSCULOSKELETAL: No joint swelling or tenderness.   SKIN: No jaundice. No acute rashes or lesions.     ROS:   12-pt ROS otherwise negative except as noted above    PMH:  Past Medical History:   Diagnosis Date     CAD (coronary artery disease)      CHF (congestive heart failure) (H)      CKD (chronic kidney disease), stage III (H)      Cortical cataract of both eyes      Diabetes (H)      Hyperlipidemia      Hypertension      Ischemic cardiomyopathy      Obesity      CHANTEL (obstructive sleep apnea)     occas cpap     Osteoarthritis        PSH:  Past Surgical History:   Procedure Laterality Date     C CABG, ARTERY-VEIN, THREE  02/2008     CATARACT IOL, RT/LT       COLONOSCOPY N/A 8/7/2019    Procedure: COLONOSCOPY, WITH POLYPECTOMY AND BIOPSY;  Surgeon: Chauncey Morataya MD;  Location:  GI     CV RIGHT HEART CATH N/A 3/25/2019    Procedure: CV RIGHT HEART CATH;  Surgeon: Moises Santos MD;  Location:  HEART CARDIAC CATH LAB     CV RIGHT HEART CATH N/A 7/10/2019    Procedure: CV RIGHT HEART CATH;  Surgeon: Jak Mccabe MD;  Location:  HEART CARDIAC CATH LAB     CV RIGHT HEART CATH N/A 7/8/2020    Procedure: Right Heart Cath with Leave In Calvin already has ICU load;  Surgeon: Jak Mccabe MD;  Location:  HEART CARDIAC CATH LAB     EXTRACTION(S) DENTAL Left 7/13/2020    Procedure: EXTRACTION, TOOTH #11, 12, 13, 15, and 29;  Surgeon: Monica Chao DDS;  Location:  OR     IMPLANT AUTOMATIC IMPLANTABLE CARDIOVERTER DEFIBRILLATOR       INSERT INTRAAORTIC BALLOON PUMP N/A 7/16/2020    Procedure: Subclavian Intra-Aortic Balloon Pump Placement;  Surgeon: Allan Sparrow MD;  Location: [facility] OR     PHACOEMULSIFICATION CLEAR CORNEA WITH STANDARD IOL,  No complaints VITRECTOMY PARSPLANA 25 GAGUE, COMBINED Left 12/10/2019    Procedure: PHACOEMULSIFICATION, CATARACT, CLEAR CORNEAL INCISION APPROACH, W STD INTRAOCULAR LENS IMPLANT INSERT + VITRECTOMY BY PARS PLANA  USING 25-GAUGE INSTRUMENTS. ENDOLASER, INFUSION OF 20% SF6 GAS;  Surgeon: Triny Bunch MD;  Location: [facility] OR     PICC INSERTION Right 2020    basilic 44 cm total        MEDICATIONS:  Prior to Admission Medications   Prescriptions Last Dose Informant Patient Reported? Taking?   aspirin 81 MG tablet  Self No No   Sig: Take 1 tablet (81 mg) by mouth daily   atorvastatin (LIPITOR) 40 MG tablet  Self No No   Sig: Take 1 tablet (40 mg) by mouth daily   blood glucose (ACCU-CHEK SMARTVIEW) test strip  Self No No   Sig: USE TO TEST THREE TIMES DAILY AS DIRECTED   blood glucose (NO BRAND SPECIFIED) test strip  Self No No   Sig: Use to test blood sugar 2 times daily or as directed.   blood glucose monitoring (ACCU-CHEK FELPIE SMARTVIEW) meter device kit  Self No No   Sig: Use to test blood sugar 3-4 times daily, as directed.   blood glucose monitoring (ONE TOUCH DELICA) lancets  Self No No   Sig: Use to test blood sugars 2 times daily.   clopidogrel (PLAVIX) 75 MG tablet  Self No No   Sig: Take 1 tablet (75 mg) by mouth daily   exenatide ER (BYDUREON) 2 MG pen  Self No No   Sig: Inject 2 mg Subcutaneous every 7 days   furosemide (LASIX) 20 MG tablet  Self No No   Sig: Take 2 tablets (40 mg) by mouth 2 times daily   glimepiride (AMARYL) 4 MG tablet  Self No No   Sig: Take 1 tablet (4 mg) by mouth every morning (before breakfast)   hydrALAZINE (APRESOLINE) 25 MG tablet  Self No No   Sig: Take 1 tablet (25 mg) by mouth 3 times daily   insulin glargine (LANTUS SOLOSTAR) 100 UNIT/ML pen  Self No No   Sig: Take 8 units in the morning and 40 units in the evening   insulin pen needle (B-D U/F) 31G X 5 MM miscellaneous  Self No No   Si Units by Device route 2 times daily Use 2 pen needles daily or as directed.    isosorbide mononitrate (IMDUR) 60 MG 24 hr tablet  Self No No   Sig: Take 1 tablet (60 mg) by mouth daily   losartan (COZAAR) 50 MG tablet  Spouse/Significant Other No No   Sig: Take 1 tablet (50 mg) by mouth daily   nitroglycerin (NITROSTAT) 0.4 MG SL tablet  Self No No   Sig: Place 1 tablet (0.4 mg) under the tongue every 5 minutes as needed for chest pain If you are still having symptoms after 3 doses (15 minutes) call 911.   order for DME  Self No No   Sig: Auto CPAP 8-15 cmH20      Facility-Administered Medications Last Administration Doses Remaining   erythromycin (ROMYCIN) ophthalmic ointment None recorded    lidocaine (PF) (XYLOCAINE) 2 % injection 10 mL None recorded 1           ALLERGIES:     Allergies   Allergen Reactions     Heparin Heparin Induced Thrombocytopenia     HIT screen sent 7/18/2020. No heparin products until resulted     No Known Drug Allergies        FAMILY HISTORY:  Family History   Problem Relation Age of Onset     Diabetes Brother      Diabetes Sister      Diabetes Sister      Macular Degeneration No family hx of      Glaucoma No family hx of      Myocardial Infarction No family hx of      Kidney Disease No family hx of        SOCIAL HISTORY:  Social History     Socioeconomic History     Marital status:      Spouse name: Not on file     Number of children: 4     Years of education: Not on file     Highest education level: Not on file   Occupational History     Occupation:      Employer: [employer]     Employer: RETIRED   Social Needs     Financial resource strain: Not on file     Food insecurity     Worry: Not on file     Inability: Not on file     Transportation needs     Medical: Not on file     Non-medical: Not on file   Tobacco Use     Smoking status: Never Smoker     Smokeless tobacco: Never Used     Tobacco comment: Never smoked; non-smoking household   Substance and Sexual Activity     Alcohol use: No     Alcohol/week: 0.0 standard drinks     Drug use:  No     Sexual activity: Yes     Partners: Female   Lifestyle     Physical activity     Days per week: Not on file     Minutes per session: Not on file     Stress: Not on file   Relationships     Social connections     Talks on phone: Not on file     Gets together: Not on file     Attends Zoroastrian service: Not on file     Active member of club or organization: Not on file     Attends meetings of clubs or organizations: Not on file     Relationship status: Not on file     Intimate partner violence     Fear of current or ex partner: Not on file     Emotionally abused: Not on file     Physically abused: Not on file     Forced sexual activity: Not on file   Other Topics Concern     Parent/sibling w/ CABG, MI or angioplasty before 65F 55M? No   Social History Narrative     Not on file       LABS:  BMP  Recent Labs   Lab 07/20/20  0506 07/20/20  0232 07/20/20  0018 07/19/20  2345 07/19/20  1613 07/19/20  0441    141 140 140   < > 134 136   POTASSIUM 3.3* 2.8* 3.1* 3.7   < > 3.9 3.8   CHLORIDE 104 104  --   --   --  103 105   CO2 20 23  --   --   --  26 27   BUN 28 28  --   --   --  34* 33*   CR 1.37* 1.33*  --   --   --  1.46* 1.52*   * 324* 334* 329*   < > 226* 156*   BRYANNA 9.1 9.0  --   --   --  8.6 8.3*   PHOS 1.1* 2.6  --   --   --  2.6 2.5    < > = values in this interval not displayed.     CBC  Recent Labs   Lab 07/20/20  0506 07/20/20  0232 07/20/20  0018 07/19/20 2345 07/19/20  2032 07/19/20  1613 07/19/20  0441   WBC 11.8* 12.5*  --   --   --   --  7.7 7.9   HGB 8.1* 7.4* 8.9* 8.7*   < >  --  10.8* 10.6*   HCT 25.5* 23.6*  --   --   --   --  34.0* 32.6*   MCV 93 96  --   --   --   --  96 95    163 43*  --   --  90* 99* 89*   LYMPH  --   --   --   --   --   --  7.9  --     < > = values in this interval not displayed.     INR  Recent Labs   Lab 07/20/20  0506 07/20/20  0232 07/20/20  0018 07/19/20 2032   INR 1.35* 1.52* 1.59* 1.23*   PTT 37 36 30 31     IMAGING:    RHC 6/18/20     RA  20/26/18  RV 85/28  PA 82/45/58  PCWP 45  Cardiac output by Jacy: 3.85 L/min (indexed to 2.09 L/min/m2)  Cardiac output by thermodilution: 3.63 L/min (indexed to 1.97 L/min/m2)  SVR (by TD CO): 1123  PVR (by TD CO): 7.4    Angiogram 6/18/20   3 vessel CAD s/p 3V CABG in 2008 (LIMA-LAD, SVG-OM1, SVG-RPDA)  2. Known proximal SVG-RPDA occlusion  3. Presumed occlusion of SVG-OM1 (unable to locate on non-selective aortic root shot)  4. Patent LIMA-LAD with collateralization of LAD to PDA    Echocardiogram ( 3/11/2019)   Moderate to severe left ventricular dilation is present.  Severely (EF 10-20%) reduced left ventricular function is present.  No significant valve dysfunction.  Compared to study done 6/5/17 there is no significant change in LV size and  systolic function    Echocardiogram ( 7/5/2020)   Interpretation Summary  Severely (EF 10-20%) reduced left ventricular function is present. LVEF 15%  based on biplane 2D tracing. Severe left ventricular dilation is present.  LVIDd:6.8 cm. Grade III or advanced diastolic dysfunction.  The right ventricle is normal size.  Global right ventricular function is moderately reduced.  No significant valvular abnormalities were noted.  IVC diameter and respiratory changes fall into an intermediate range  suggesting an RA pressure of 8 mmHg.  Mild pulmonary hypertension is present.     This study was compared with the study from 3/25/2019. RV function has  worsened, LV size and function appear similar.    Devices  ICD ([employer]- 4/2/2015 )     I have reviewed today's vital signs, notes, medications, labs and imaging.  I have also seen and examined the patient and agree with the findings and plan as outlined above.  Pt POD #1 S/P OHT with global function reduced to 25% but hemodynamically stable on Epi, NE, Vent Support with downtrending lactic acid and 50 ml UO per hr.  CVP 14, PA 36/18, CI 2.8 and PCW 15 with PVR 1.9 pendleton.  Lungs clear and two component rub with  tachycardia.  Abd with hypoactive BS.  Labs with Cr 1.37 and Hgb 8.1.  Assessment: Pt with OHT with following medical issues:   1) IS on simulect protocol who received soluedral and cellcept  2) HIT pos awaiting CORRINE.    3) Decreased graft function but stable hemodynamics--we believe the function will normalize with supportive care  4) CKD stable   5) Nutritional support  6) Continue subclavian IABP support     Pt seen X4 for total critical care time 45 min.     James Terrazas MD, PhD  Professor, Heart Failure and Cardiac Transplantation  [facility]

## 2020-07-20 NOTE — PHARMACY-VANCOMYCIN DOSING SERVICE
Pharmacy Vancomycin Initial Note  Date of Service 2020  Patient's  1955  64 year old, male    Indication: Surgical Prophylaxis    Current estimated CrCl = Estimated Creatinine Clearance: 49.3 mL/min (A) (based on SCr of 1.37 mg/dL (H)).    Creatinine for last 3 days  2020:  3:22 PM Creatinine 1.61 mg/dL  2020:  3:42 AM Creatinine 1.68 mg/dL;  4:46 PM Creatinine 1.70 mg/dL  2020:  4:41 AM Creatinine 1.52 mg/dL;  4:13 PM Creatinine 1.46 mg/dL  2020:  2:32 AM Creatinine 1.33 mg/dL;  5:06 AM Creatinine 1.37 mg/dL    Recent Vancomycin Level(s) for last 3 days  No results found for requested labs within last 72 hours.      Vancomycin IV Administrations (past 72 hours)                   vancomycin (VANCOCIN) 1000 mg in dextrose 5% 200 mL PREMIX (mg) 1,000 mg Given 20                Nephrotoxins and other renal medications (From now, onward)    Start     Dose/Rate Route Frequency Ordered Stop    20 0800  vancomycin 1500 mg in 0.9% NaCl 250 ml intermittent infusion 1,500 mg      1,500 mg  over 90 Minutes Intravenous EVERY 18 HOURS 20 0752      20 0345  norepinephrine (LEVOPHED) 16 mg in  mL infusion      0.03-0.4 mcg/kg/min × 78.2 kg (Dosing Weight)  2.2-29.3 mL/hr  Intravenous CONTINUOUS 20 0340      20 0330  vasopressin (VASOSTRICT) 40 Units in sodium chloride 0.9 % 40 mL infusion      0.5-4 Units/hr  0.5-4 mL/hr  Intravenous CONTINUOUS 20 0239            Contrast Orders - past 72 hours (72h ago, onward)    None                Plan:  1.  Start vancomycin  1500 mg IV q18h.   2.  Goal Trough Level: 15-20 mg/L   3.  Pharmacy will check trough levels as appropriate in 1-3 Days.    4. Serum creatinine levels will be ordered daily for the first week of therapy and at least twice weekly for subsequent weeks.    5. [place] method utilized to dose vancomycin therapy: Method 2    Shaquille Hamilton, PharmD

## 2020-07-20 NOTE — PROGRESS NOTES
D: POD #1 OHTx. Remains sedated/intubated, on propofol/fent gtts. Followed some simple commands and moved extremities w/ lower dose of sedation. Denied pain. Strong cough w/ ETT sxn. Pupils L > R (hx of L eye surgery). Good oxygenation, on PEEP 8 and N.O. 20, no weaning today per CVTS and Intensivist teams. Low BP and frequent nonsustained VT runs at start of shift; this improved w/ PRBC x2 unit(s) given and K and Phos replacement. AAI paced at 115. Isuprel at 0.01. Per Dr. Huertas and Dr. Griselli, no weaning on epi (0.05) and NE (0.05) today. SC IABP in place. Hyperglycemic requiring high dose insulin gtt, BG monitored, now improving. Chest drains small. UOP ~50 ml/h.   I: As above. Spouse updated re: status. MD updated re: labs/status.  A: Clinically improving.  P: Continue to monitor. Notify MD of changes/concerns. Anticipate some support weaning as clinical condition allows tomorrow.

## 2020-07-20 NOTE — ANESTHESIA PROCEDURE NOTES
Perioperative ADARSH Report  Anesthesia Information  ADARSH probe placed and report generated by: : Warren Sellers MD Kiberenge, Roy Kagumba, MD  Images for this study have been archived.   Surgeon:  Warren Sellers MD      Preanesthesia Checklist:  Patient identified, IV assessed, risks and benefits discussed, monitors and equipment assessed, procedure being performed at surgeon's request and anesthesia consent obtained.  General Procedure Information  Modalities:  3D, CW Doppler, PW Doppler and 2D  Diagnostic indications for ADARSH:                             Heart Transplant.    Echocardiographic and Doppler Measurements  Right Ventricle:  Cavity size dilated.   Hypertrophy present.   Thrombus not present.    Global function moderately impaired.     Left Ventricle:  Cavity size dilated.   Hypertrophy not present.   Thrombus not present.   Global Function severely impaired.   Ejection Fraction 10%.        Valves  Aortic Valve: Annulus normal.  Stenosis not present.  Regurgitation +1.  Leaflets normal.    Mitral Valve: Stenosis not present.  Regurgitation +2.  Leaflets normal.  Leaflet motions normal.    Tricuspid Valve: Annulus normal.  Regurgitation +1.    Pulmonic Valve: Annulus normal.  Regurgitation absent.      Aorta: Ascending Aorta: Size normal.    Aortic Arch: Size normal.     Descending Aorta: Size normal.     Other Aortic Findings:  There is an IABP in the descending aorta with the tip of the balloon 2cm from the aortic arch   Right Atrium:  Size normal.    Left Atrium: Size normal.  Left atrial appendage normal.     Atrial Septum: Intra-atrial septal morphology normal.     Ventricular Septum: Intra-ventricular septum morphology normal.       Other Findings:   .  Pleural Effusion:  left. Pulmonary Arteries:  normal.  .  No coronary sinus catheter present.  .  .  Post Intervention Findings  Procedure(s) performed:  Heart or Lung Transplant. .   Regional wall motion:   Surgeon(s) notified of  all postintervention findings:  Yes  .  .  .   .  .  .  .  Heart Transplant:  Transplant heart function normal.  .  .    S/p heart transplant   Patient was on the following medications:   0.03 mcg/kg/min epinephrine,  0.03 mcg/kg/min isoproterenol,  2.4 units/hr      LV is normal in size, with mildly decreased systolic function   LVEF: 40-45 % by visual estimation   E: 86.8 cm/s  decel time 123 ms        RV is mildly dilated in size with maintained systolic function      AV: trileaflet         no AS         Max P mmHg,  Mean P mmHg, Vmax: 1.4 m/s,           AV VTI: 23.5 cm,  LVOT VTI: 10.9 cm         no AI             MV: normal mitral leaflet morphology          no mitral annular calcification          mild MR with centrally directed jet.     TV: mild TR.      PV: no PI    There is no evidence of aortic aneurysm or dissection.   The IABP is seen in the descending aorta with the tip of the balloon         Echocardiogram Comments

## 2020-07-20 NOTE — PROGRESS NOTES
Final Crossmatch   Final crossmatch results for UNOS ID [id number] and recipient sample date 07.19.2020 and 07.07.2020  were both T cell and B cell, allo and auto negative.  DSA PENDING

## 2020-07-20 NOTE — PROCEDURES
Laboratory Medicine and Pathology  Transfusion Medicine - Apheresis Procedure    Lucian Henderson Sr. 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 is a 64 year old male with a PMH of HTN, DMII, obesity, CKD, CHANTEL, ischemic cardiomyopathy and severe LV systolic dysfunction s/p 3V CABG (2008) and primary prevention ICD (4/2/15). He presented with 3 days of worsening fatigue and SOB with minimal exertion.  He was admitted for possible LVAD/transplant work up.  A heart is now available, and the plan is to transplant the patient this evening.    Unfortunately, the patient's platelet count has drifted down from 180s-low 200s over the past few days. Platelet count was 117 (7/17/20), 95 (7/18/20), now 89 (7/19/20).  Drop was concerning for HIT, as per MAR heparin was started on 7/10/20 (plt count 185) and discontinued 7/18/20.  Sample for HIT screen drawn on 7/18/20.  Positive result was relayed today (2.9 U/mL), and Cardiology requested TPE #1 pre-heart transplant and intra-operatively tonight (estimate 11-1130pm start for TPE #2) to lower HIT-related antibody.      TPE #1 summary: The patient tolerated TPE #1 without issues/concerns/complaints.  The Apheresis RN is preparing to move onto TPE #2 in the OR.  Reminded Cardiology team (Dr. Raven Malhotra) that TPE will lower plasma levels of meds, including heparin and monoclonal antibodies.  Continue with plan as per Cardiology.     Please do not start or continue ACE-inhibitors throughout the duration of a TPE series.  Please notify the apheresis physician of any upcoming procedures, surgeries, or biopsies as TPE will affect coagulation factors.           Chief Complaint:   Shortness of breath, fatigue.           Past Medical History:     Past Medical History:   Diagnosis  Date     CAD (coronary artery disease)      CHF (congestive heart failure) (H)      CKD (chronic kidney disease), stage III (H)      Cortical cataract of both eyes      Diabetes (H)      Hyperlipidemia      Hypertension      Ischemic cardiomyopathy      Obesity      CHANTEL (obstructive sleep apnea)     occas cpap     Osteoarthritis           Past Surgical History:     Past Surgical History:   Procedure Laterality Date     C CABG, ARTERY-VEIN, THREE  02/2008     CATARACT IOL, RT/LT       COLONOSCOPY N/A 8/7/2019    Procedure: COLONOSCOPY, WITH POLYPECTOMY AND BIOPSY;  Surgeon: Chauncey Morataya MD;  Location: [facility]     CV RIGHT HEART CATH N/A 3/25/2019    Procedure: CV RIGHT HEART CATH;  Surgeon: Moises Santos MD;  Location:  HEART CARDIAC CATH LAB     CV RIGHT HEART CATH N/A 7/10/2019    Procedure: CV RIGHT HEART CATH;  Surgeon: Jak Mccabe MD;  Location:  HEART CARDIAC CATH LAB     CV RIGHT HEART CATH N/A 7/8/2020    Procedure: Right Heart Cath with Leave In [place] already has ICU load;  Surgeon: Jak Mccabe MD;  Location:  HEART CARDIAC CATH LAB     EXTRACTION(S) DENTAL Left 7/13/2020    Procedure: EXTRACTION, TOOTH #11, 12, 13, 15, and 29;  Surgeon: Monica Chao DDS;  Location: [facility]     IMPLANT AUTOMATIC IMPLANTABLE CARDIOVERTER DEFIBRILLATOR       INSERT INTRAAORTIC BALLOON PUMP N/A 7/16/2020    Procedure: Subclavian Intra-Aortic Balloon Pump Placement;  Surgeon: Allan Sparrow MD;  Location: [facility]     PHACOEMULSIFICATION CLEAR CORNEA WITH STANDARD IOL, VITRECTOMY PARSPLANA 25 GAGUE, COMBINED Left 12/10/2019    Procedure: PHACOEMULSIFICATION, CATARACT, CLEAR CORNEAL INCISION APPROACH, W STD INTRAOCULAR LENS IMPLANT INSERT + VITRECTOMY BY PARS PLANA  USING 25-GAUGE INSTRUMENTS. ENDOLASER, INFUSION OF 20% SF6 GAS;  Surgeon: Triny Bunch MD;  [facility]     PICC INSERTION Right 07/11/2020    basilic 44 cm total           Social History:   , 4 children, retired,  never smoker          Family History:     Family History   Problem Relation Age of Onset     Diabetes Brother      Diabetes Sister      Diabetes Sister      Macular Degeneration No family hx of      Glaucoma No family hx of      Myocardial Infarction No family hx of      Kidney Disease No family hx of           Immunizations:     Most Recent Immunizations   Administered Date(s) Administered     Influenza (IIV3) PF 10/15/2012     Influenza Vaccine IM > 6 months Valent IIV4 10/03/2018     Pneumococcal 23 valent 04/03/2015     TDAP Vaccine (Adacel) 08/04/2009             Allergies:     Allergies   Allergen Reactions     Heparin Heparin Induced Thrombocytopenia     HIT screen sent 7/18/2020. No heparin products until resulted     No Known Drug Allergies           Medications:     Current Facility-Administered Medications   Medication     [Auto Hold] acetaminophen (TYLENOL) tablet 650 mg     [Auto Hold] aminocaproic acid (AMICAR) 5 g in sodium chloride 0.9 % 100 mL bolus     aminocaproic acid (AMICAR) 5 g in sodium chloride 0.9 % 100 mL infusion     [Auto Hold] amiodarone (PACERONE) tablet 200 mg     [Auto Hold] aspirin (ASA) chewable tablet 81 mg     [Auto Hold] atorvastatin (LIPITOR) tablet 40 mg     basiliximab (SIMULECT) 20 mg in sodium chloride 0.9 % 50 mL infusion     [Auto Hold] ceFEPIme (MAXIPIME) 1g vial to attach to  ml bag for ADULTS or NS 50 ml bag for PEDS     ceFEPIme (MAXIPIME) 2 g vial to attach to  ml bag for ADULTS or 50 ml bag for PEDS     [Auto Hold] glucose gel 15-30 g    Or     [Auto Hold] dextrose 50 % injection 25-50 mL    Or     [Auto Hold] glucagon injection 1 mg     EPINEPHrine (ADRENALIN) 5 mg in sodium chloride 0.9 % 250 mL infusion     [Auto Hold] furosemide (LASIX) injection 40 mg     [Auto Hold] HYDROmorphone (PF) (DILAUDID) injection 0.2 mg     [Auto Hold] insulin aspart (NovoLOG) injection (RAPID ACTING)     [Auto Hold] insulin aspart (NovoLOG) injection (RAPID ACTING)      [Auto Hold] insulin glargine (LANTUS PEN) injection 20 Units     isoproterenol (ISUPREL) 1 mg in D5W 50 mL infusion     lidocaine (LMX4) cream     [Auto Hold] lidocaine (LMX4) cream     [Auto Hold] lidocaine 1 % 0.1-1 mL     lidocaine 1 % 1 mL     [Auto Hold] magnesium sulfate 2 g in water intermittent infusion     [Auto Hold] magnesium sulfate 4 g in 100 mL sterile water (premade)     medication instruction     mycophenolate (GENERIC EQUIVALENT) capsule 1,500 mg     [Auto Hold] naloxone (NARCAN) injection 0.1-0.4 mg     [Auto Hold] nitroGLYcerin (NITROSTAT) sublingual tablet 0.4 mg     norepinephrine (LEVOPHED) 16 mg in  mL infusion     [Auto Hold] oxyCODONE (ROXICODONE) tablet 5 mg     [Auto Hold] polyethylene glycol (MIRALAX) Packet 17 g     [Auto Hold] potassium chloride (KLOR-CON) Packet 20-40 mEq     [Auto Hold] potassium chloride 10 mEq in 100 mL intermittent infusion with 10 mg lidocaine     [Auto Hold] potassium chloride 10 mEq in 100 mL sterile water intermittent infusion (premix)     [Auto Hold] potassium chloride 20 mEq in 50 mL intermittent infusion     [Auto Hold] potassium chloride ER (KLOR-CON M) CR tablet 20-40 mEq     [Auto Hold] senna-docusate (SENOKOT-S/PERICOLACE) 8.6-50 MG per tablet 1 tablet    Or     [Auto Hold] senna-docusate (SENOKOT-S/PERICOLACE) 8.6-50 MG per tablet 2 tablet     sodium chloride (PF) 0.9% PF flush 3 mL     sodium chloride (PF) 0.9% PF flush 3 mL     [Auto Hold] sodium chloride (PF) 0.9% PF flush 3 mL     [Auto Hold] sodium chloride (PF) 0.9% PF flush 3 mL     [Auto Hold] trimethobenzamide (TIGAN) capsule 300 mg     vancomycin (VANCOCIN) 1000 mg in dextrose 5% 200 mL PREMIX     vancomycin (VANCOCIN) 1000 mg in dextrose 5% 200 mL PREMIX     vasopressin (VASOSTRICT) 40 Units in sodium chloride 0.9 % 40 mL infusion     Facility-Administered Medications Ordered in Other Encounters   Medication     EPINEPHrine 100 mcg/10 mL (1:100,000) in NS (Pharmacy Prepared for OR)      etomidate (AMIDATE) injection     fentaNYL (PF) (SUBLIMAZE) injection     lidocaine 2% injection (MDV)     midazolam (VERSED) injection     Plasma-Lyte A (electrolyte A) solution     sodium chloride (PF) 0.9% PF flush 10 mL     vasopressin 1 unit/mL syringe          Review of Systems:   See above.           Data:     Vitals:    07/19/20 1800 07/19/20 1803 07/19/20 1828 07/19/20 1900   BP:       BP Location:       Pulse:       Resp:   18    Temp:  98.4  F (36.9  C) 98.5  F (36.9  C)    TempSrc:  Oral Oral    SpO2: 98%   98%   Weight:       Height:         ROUTINE IP LABS (Last four results)  BMP  Recent Labs   Lab 07/19/20  1613 07/19/20  0441 07/18/20  1646 07/18/20  0342    136 134 136   POTASSIUM 3.9 3.8 4.2 4.1   CHLORIDE 103 105 101 107   BRYANNA 8.6 8.3* 8.5 8.5   CO2 26 27 27 25   BUN 34* 33* 39* 37*   CR 1.46* 1.52* 1.70* 1.68*   * 156* 282* 141*     CBC  Recent Labs   Lab 07/19/20  1613 07/19/20  0441 07/18/20  0342 07/17/20  0350   WBC 7.7 7.9 9.7 11.4*   RBC 3.56* 3.45* 3.28* 3.60*   HGB 10.8* 10.6* 10.1* 10.9*   HCT 34.0* 32.6* 31.8* 34.7*   MCV 96 95 97 96   MCH 30.3 30.7 30.8 30.3   MCHC 31.8 32.5 31.8 31.4*   RDW 14.7 14.8 15.3* 15.1*   PLT 99* 89* 95* 117*     INR  Recent Labs   Lab 07/19/20  1613 07/19/20  1311 07/13/20  0351   INR 1.18* 1.18* 1.05       Attestation: I, Fracisco Dorantes M.D., was immediately available and present on-site during the entirety of the procedure.  I reviewed the patient's chart and was in direct communication with apheresis nursing staff during the TPE.  I did check-in with the patient from just outside his ICU room as we were starting the TPE.  We will be starting the intra-operative TPE shortly.    Fracisco Dorantes M.D.  Professor, Transfusion Medicine  Laboratory Medicine & Pathology  Pager: [phone]

## 2020-07-20 NOTE — H&P
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)     Hyperlipidemia LDL goal <100     Hypertension goal BP (blood pressure) < 140/90     CHF (NYHA class II, ACC/AHA stage C) (H)     CKD (chronic kidney disease) stage 3, GFR 30-59 ml/min (H)     Health Care Home     Automatic implantable cardioverter-defibrillator - [employer] single lead ICD, Not Dependent     Chronic systolic heart failure (H)     Proteinuria     Vitamin D deficiency     OA (osteoarthritis) of knee     Chondromalacia of patella, unspecified laterality     Pain in joint of left shoulder     Tinnitus     Ptosis of right eyelid     Secondary renal hyperparathyroidism (H)     Cortical cataract of both eyes     Moderate nonproliferative diabetic retinopathy, without macular edema, associated with type 2 diabetes mellitus     Obesity     CHANTEL (obstructive sleep apnea)- severe (AHI 30)     Systolic heart failure (H)     Heart transplant candidate     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, unspecified whether long term insulin use (H)     Nuclear cataract, nonsenile     Coronary artery disease involving native coronary artery of native heart without angina pectoris     Status post coronary angiogram     CHF (congestive heart failure) (H)     Acute on chronic systolic congestive heart failure (H)     Heart failure (H)     Dental caries     Heart transplant, orthotopic, status (H)       ASSESSMENT: Lucian Oliveira Santiago Sr. is a 64 year old male with PMH T2DM, HTN, CKD, CHANTEL, ICM with severe LV dysfunction, s/p 3V CABG (2008), ICD (2015), S/p Heart Transplant on 7/20 with Dr. Griselli.     Today's plan:   -Post op resuscitation   -Start lantus 40 daily   -Consult for feeding tube placement   -Echo  -No changes to Lalito, isoproterenol, and  pressers for today  -Trend lactic acid    PLAN:  Neuro/ pain/ sedation:  #Acute post operative pain  - Monitor neurological status. Notify the MD for any acute changes in exam.  - Fentanyl gtt for pain, tylenol, gabapentin, oxy/dilaudid/robaxin PRN  - Propofol gtt for sedation    Pulmonary care:   #CHANTEL  - CMV/AC 11/550/8/40  - Wean Lalito tomorrow  - Titrate FiO2 for SpO2 >92%    Cardiovascular:    #ICM with sever LV dysfunction s/p heart transplant 7/20  #S/p 3V CABG (2008)  #VT/VF arrhythmia s/p ICD (2015)  #Atrial flutter with RVR  #HTN/HLD  #IABP (subclavian)  - Monitor hemodynamic status.   - MAP >65  - Pressers Epi 0.05, NE 0.05  - Isoproterenol 0.01  - PTA aspirin 81 mg, atorvastatin 40 mg, plavix 75 mg, lasix 20 mg BID  - PTA hydralazine 25 mg TID, losartan 50 mg    GI/Nutrition:   #Lactic Acidosis  - NPO except ice chips and medications  - Feeding tube consult placed   - lactic acid down trending     Fluids/ Electrolytes/Renal:   #CKD, baseline Cr 1.6  #Hypokalemia, improving  - TKO for IV fluid hydration  - Urine output is adequate so far  - Will continue to monitor intake and output.  - Cr 1.37  - Replete electrolytes PRN     Endocrine:    #T2DM  # Stress hyperglycemia  - Insulin gtt  - Start lantus 40 units daily   - PTA exenatide 2mg Q7days, glimepiride 4 mg, Lantus 8 units in the morning and 40 units in the evening.    ID/ Antibiotics:  - Complete perioperative antibiotics: cefepime x 5 days and Vancomycin x 3 days  - Nystatin    Immunosuppression:  -Salumedrol today, then prednisone taper   -Cellcept, prednisone    Heme:     #Thrombocytopenin  #HIT positive   - Hgb goal >7  - Received 2 units pRBC, 2 units plts, 1 unit FFP, 1 unit cryo   - hematology following  - Awaiting CORRINE for final determination of anticoagulation plan     Prophylaxis:    - Mechanical prophylaxis for DVT.   - No chemical DVT prophylaxis due to high risk of bleeding.   - Protonix qd    Lines/ tubes/ drains:  - MAC (7/19), [place] (7/19),  Arterial line (7/19), PICC (7/11), PIV (7/20), [place] (7/19), CT    Disposition:  - CV ICU    Patient seen, findings and plan discussed with CV ICU staff, Dr. Crump.    aYnely Vega MD ([facility])  Anesthesiology Resident  *[id number]    ====================================    HPI:   Lucian Henderson Sr. is a 64 year old male with PMH T2DM, HTN, CKD, CHANTEL, ICM with severe LV dysfunction, s/p 3V CABG (2008), ICD (2015), S/p Heart Transplant on 7/20 with Dr. Griselli.     Intraoperative course was notable for Heart with increased cold ischemic time. EBL was 1L for which the patient received 1 units plts, 1 unit FFP, 1 unit Cryo.    Patient arrived with hypokalemia, acidotic. Received 2 amps of bicarb, 2 units of pRBCs, and potassium given.       PAST MEDICAL HISTORY:   Past Medical History:   Diagnosis Date     CAD (coronary artery disease)      CHF (congestive heart failure) (H)      CKD (chronic kidney disease), stage III (H)      Cortical cataract of both eyes      Diabetes (H)      Hyperlipidemia      Hypertension      Ischemic cardiomyopathy      Obesity      CHANTEL (obstructive sleep apnea)     occas cpap     Osteoarthritis        PAST SURGICAL HISTORY:   Past Surgical History:   Procedure Laterality Date     C CABG, ARTERY-VEIN, THREE  02/2008     CATARACT IOL, RT/LT       COLONOSCOPY N/A 8/7/2019    Procedure: COLONOSCOPY, WITH POLYPECTOMY AND BIOPSY;  Surgeon: Chauncey Morataya MD;  Location: [facility]     CV RIGHT HEART CATH N/A 3/25/2019    Procedure: CV RIGHT HEART CATH;  Surgeon: Moises Santos MD;  Location: [facility]     CV RIGHT HEART CATH N/A 7/10/2019    Procedure: CV RIGHT HEART CATH;  Surgeon: Jak Mccabe MD;  Location[facility]     CV RIGHT HEART CATH N/A 7/8/2020    Procedure: Right Heart Cath with Leave In [place] already has ICU load;  Surgeon: Jak Mccabe MD;  Location: [facility]     EXTRACTION(S) DENTAL Left 7/13/2020    Procedure:  EXTRACTION, TOOTH #11, 12, 13, 15, and 29;  Surgeon: Monica Chao DDS;  Location: [facility]     IMPLANT AUTOMATIC IMPLANTABLE CARDIOVERTER DEFIBRILLATOR       INSERT INTRAAORTIC BALLOON PUMP N/A 7/16/2020    Procedure: Subclavian Intra-Aortic Balloon Pump Placement;  Surgeon: Allan Sparrow MD;  Location: [facility]     PHACOEMULSIFICATION CLEAR CORNEA WITH STANDARD IOL, VITRECTOMY PARSPLANA 25 GAGUE, COMBINED Left 12/10/2019    Procedure: PHACOEMULSIFICATION, CATARACT, CLEAR CORNEAL INCISION APPROACH, W STD INTRAOCULAR LENS IMPLANT INSERT + VITRECTOMY BY PARS PLANA  USING 25-GAUGE INSTRUMENTS. ENDOLASER, INFUSION OF 20% SF6 GAS;  Surgeon: Triny Bunch MD;  [facility]     PICC INSERTION Right 07/11/2020    basilic 44 cm total        FAMILY HISTORY:   Family History   Problem Relation Age of Onset     Diabetes Brother      Diabetes Sister      Diabetes Sister      Macular Degeneration No family hx of      Glaucoma No family hx of      Myocardial Infarction No family hx of      Kidney Disease No family hx of        SOCIAL HISTORY:   Social History     Tobacco Use     Smoking status: Never Smoker     Smokeless tobacco: Never Used     Tobacco comment: Never smoked; non-smoking household   Substance Use Topics     Alcohol use: No     Alcohol/week: 0.0 standard drinks         OBJECTIVE:  1. VITAL SIGNS:   Temp:  [96.4  F (35.8  C)-98.8  F (37.1  C)] 97.3  F (36.3  C)  Heart Rate:  [] 115  Resp:  [7-24] 14  MAP:  [49 mmHg-93 mmHg] 69 mmHg  Arterial Line BP: ()/(20-67) 72/67  FiO2 (%):  [60 %-100 %] 60 %  SpO2:  [95 %-100 %] 100 %    Ventilation Mode: CMV/AC  (Continuous Mandatory Ventilation/ Assist Control)  FiO2 (%): 60 %  Rate Set (breaths/minute): 14 breaths/min  Tidal Volume Set (mL): 550 mL  PEEP (cm H2O): 5 cmH2O  Oxygen Concentration (%): 100 %  Resp: 14        2. INTAKE/ OUTPUT:   I/O last 3 completed shifts:  In: 930 [P.O.:600; I.V.:330]  Out: 2825 [Urine:2825]        3. PHYSICAL  EXAMINATION:   General: Intubated   Neuro: Sedated, moves all extremities spontaneously   Resp: Breathing non-labored, mechanical ventilation   CV: A-paced at 115, CTs (mediastinal/pleural) to LIS  Abdomen: Soft, Non-distended  Incisions: c/d/i   Extremities: warm and well perfused    4. INVESTIGATIONS:   Arterial Blood Gases   Recent Labs   Lab 07/20/20  0506 07/20/20  0232 07/20/20  0018  07/19/20  1613   WBC 11.8* 12.5*  --   --  7.7   HGB 8.1* 7.4* 8.9*   < > 10.8*    163 43*   < > 99*   INR 1.35* 1.52* 1.59*   < > 1.18*   NA  --  141 140   < > 134   POTASSIUM  --  2.8* 3.1*   < > 3.9   BUN  --  28  --   --  34*   CR  --  1.33*  --   --  1.46*    < > = values in this interval not displayed.       5. RADIOLOGY:   Reviewed in EPIC    =========================================

## 2020-07-20 NOTE — PROCEDURES
Laboratory Medicine and Pathology  Transfusion Medicine - Apheresis Procedure    Lucian Henderson . 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 is a 64 year old male with a PMH of HTN, DMII, obesity, CKD, CHANTEL, ischemic cardiomyopathy and severe LV systolic dysfunction s/p 3V CABG (2008) and primary prevention ICD (4/2/15). He presented with 3 days of worsening fatigue and SOB with minimal exertion.  He was admitted for possible LVAD/transplant work up.  A heart is now available, and the plan is to transplant the patient this evening.    Unfortunately, the patient's platelet count has drifted down from 180s-low 200s over the past few days. Platelet count was 117 (7/17/20), 95 (7/18/20), now 89 (7/19/20).  Drop was concerning for HIT, as per MAR heparin was started on 7/10/20 (plt count 185) and discontinued 7/18/20.  Sample for HIT screen drawn on 7/18/20.  Positive result was relayed today (2.9 U/mL), and Cardiology requested TPE #1 pre-heart transplant and intra-operatively tonight (estimate 11-1130pm start for TPE #2) to lower HIT-related antibody.      TPE #2 summary: The patient tolerated the intra-operative TPE (#2 of 2 TPEs today) using by-pass circuit and frozen plasma as exchange fluid.  Patient received heart transplant.           Chief Complaint:   Shortness of breath, fatigue.           Past Medical History:     Past Medical History:   Diagnosis Date     CAD (coronary artery disease)      CHF (congestive heart failure) (H)      CKD (chronic kidney disease), stage III (H)      Cortical cataract of both eyes      Diabetes (H)      Hyperlipidemia      Hypertension      Ischemic cardiomyopathy      Obesity      CHANTEL (obstructive sleep apnea)     occas cpap     Osteoarthritis           Past Surgical  History:     Past Surgical History:   Procedure Laterality Date     C CABG, ARTERY-VEIN, THREE  02/2008     CATARACT IOL, RT/LT       COLONOSCOPY N/A 8/7/2019    Procedure: COLONOSCOPY, WITH POLYPECTOMY AND BIOPSY;  Surgeon: Chauncey Morataya MD;  Location: [facility]     CV RIGHT HEART CATH N/A 3/25/2019    Procedure: CV RIGHT HEART CATH;  Surgeon: Moises Santos MD;  Location:  HEART CARDIAC CATH LAB     CV RIGHT HEART CATH N/A 7/10/2019    Procedure: CV RIGHT HEART CATH;  Surgeon: Jak Mccabe MD;  Location:  HEART CARDIAC CATH LAB     CV RIGHT HEART CATH N/A 7/8/2020    Procedure: Right Heart Cath with Leave In [place] already has ICU load;  Surgeon: Jak Mccabe MD;  Location:  HEART CARDIAC CATH LAB     EXTRACTION(S) DENTAL Left 7/13/2020    Procedure: EXTRACTION, TOOTH #11, 12, 13, 15, and 29;  Surgeon: Monica Chao DDS;  Location: [facility] OR     IMPLANT AUTOMATIC IMPLANTABLE CARDIOVERTER DEFIBRILLATOR       INSERT INTRAAORTIC BALLOON PUMP N/A 7/16/2020    Procedure: Subclavian Intra-Aortic Balloon Pump Placement;  Surgeon: Allan Sparrow MD;  Location: [facility] OR     PHACOEMULSIFICATION CLEAR CORNEA WITH STANDARD IOL, VITRECTOMY PARSPLANA 25 GAGUE, COMBINED Left 12/10/2019    Procedure: PHACOEMULSIFICATION, CATARACT, CLEAR CORNEAL INCISION APPROACH, W STD INTRAOCULAR LENS IMPLANT INSERT + VITRECTOMY BY PARS PLANA  USING 25-GAUGE INSTRUMENTS. ENDOLASER, INFUSION OF 20% SF6 GAS;  Surgeon: Triny Bunch MD;  [facility]     PICC INSERTION Right 07/11/2020    basilic 44 cm total           Social History:   , 4 children, retired, never smoker          Family History:     Family History   Problem Relation Age of Onset     Diabetes Brother      Diabetes Sister      Diabetes Sister      Macular Degeneration No family hx of      Glaucoma No family hx of      Myocardial Infarction No family hx of      Kidney Disease No family hx of           Immunizations:     Most Recent Immunizations    Administered Date(s) Administered     Influenza (IIV3) PF 10/15/2012     Influenza Vaccine IM > 6 months Valent IIV4 10/03/2018     Pneumococcal 23 valent 04/03/2015     TDAP Vaccine (Adacel) 08/04/2009             Allergies:     Allergies   Allergen Reactions     Heparin Heparin Induced Thrombocytopenia     HIT screen sent 7/18/2020. No heparin products until resulted     No Known Drug Allergies           Medications:     Current Facility-Administered Medications   Medication     acetaminophen (TYLENOL) tablet 975 mg     ceFEPIme (MAXIPIME) 2 g vial to attach to  ml bag for ADULTS or 50 ml bag for PEDS     dextrose 10% infusion     glucose gel 15-30 g    Or     dextrose 50 % injection 25-50 mL    Or     glucagon injection 1 mg     diphenhydrAMINE (BENADRYL) solution 12.5 mg    Or     diphenhydrAMINE (BENADRYL) injection 12.5 mg     EPINEPHrine (ADRENALIN) 5 mg in sodium chloride 0.9 % 250 mL infusion     fentaNYL (SUBLIMAZE) infusion     gabapentin (NEURONTIN) capsule 100 mg     HYDROmorphone (DILAUDID) injection 0.2 mg     insulin 1 unit/mL in saline (NovoLIN, HumuLIN Regular) drip - ADULT IV Infusion     isoproterenol (ISUPREL) 1 mg in D5W 50 mL infusion     lidocaine (LMX4) cream     lidocaine (XYLOCAINE) 2 % solution 5 mL     lidocaine 1 % 0.1-1 mL     magnesium sulfate 2 g in water intermittent infusion     magnesium sulfate 4 g in 100 mL sterile water (premade)     medication instruction     [START ON 7/21/2020] melatonin tablet 1 mg     methocarbamol (ROBAXIN) tablet 500 mg     methylPREDNISolone sodium succinate (solu-MEDROL) injection 125 mg     mycophenolate mofetil (CELLCEPT) 1,500 mg in D5W intermittent infusion     naloxone (NARCAN) injection 0.1-0.4 mg     nitroGLYcerin (NITROSTAT) sublingual tablet 0.4 mg     norepinephrine (LEVOPHED) 16 mg in  mL infusion     nystatin (MYCOSTATIN) suspension 1,000,000 Units     ondansetron (ZOFRAN-ODT) ODT tab 4 mg    Or     ondansetron (ZOFRAN)  injection 4 mg     oxyCODONE (ROXICODONE) tablet 5 mg     pantoprazole (PROTONIX) 40 mg IV push injection     polyethylene glycol (MIRALAX) Packet 17 g     potassium chloride (KLOR-CON) Packet 20-40 mEq     potassium chloride 10 mEq in 100 mL intermittent infusion with 10 mg lidocaine     potassium chloride 10 mEq in 100 mL sterile water intermittent infusion (premix)     potassium chloride 20 mEq in 50 mL intermittent infusion     potassium chloride ER (KLOR-CON M) CR tablet 20-40 mEq     potassium phosphate 10 mmol in D5W 250 mL intermittent infusion     potassium phosphate 15 mmol in D5W 250 mL intermittent infusion     potassium phosphate 20 mmol in D5W 250 mL intermittent infusion     potassium phosphate 20 mmol in D5W 500 mL intermittent infusion     potassium phosphate 25 mmol in D5W 500 mL intermittent infusion     [START ON 7/21/2020] predniSONE (DELTASONE) tablet 40 mg    Followed by     [START ON 7/23/2020] predniSONE (DELTASONE) tablet 35 mg    Followed by     [START ON 7/25/2020] predniSONE (DELTASONE) tablet 30 mg    Followed by     [START ON 7/27/2020] predniSONE (DELTASONE) tablet 25 mg    Followed by     [START ON 7/29/2020] predniSONE (DELTASONE) tablet 20 mg     prochlorperazine (COMPAZINE) injection 5 mg    Or     prochlorperazine (COMPAZINE) tablet 5 mg     propofol (DIPRIVAN) infusion     Reason beta blocker order not selected     senna-docusate (SENOKOT-S/PERICOLACE) 8.6-50 MG per tablet 1 tablet    Or     senna-docusate (SENOKOT-S/PERICOLACE) 8.6-50 MG per tablet 2 tablet     senna-docusate (SENOKOT-S/PERICOLACE) 8.6-50 MG per tablet 1 tablet    Or     senna-docusate (SENOKOT-S/PERICOLACE) 8.6-50 MG per tablet 2 tablet     sodium chloride (PF) 0.9% PF flush 3 mL     sodium chloride (PF) 0.9% PF flush 3 mL     vancomycin 1500 mg in 0.9% NaCl 250 ml intermittent infusion 1,500 mg     vasopressin (VASOSTRICT) 40 Units in sodium chloride 0.9 % 40 mL infusion          Review of Systems:   See  above.           Data:     Vitals:    07/20/20 1045 07/20/20 1100 07/20/20 1115 07/20/20 1130   BP:       BP Location:       Pulse:       Resp:  11     Temp:       TempSrc:       SpO2: 100% 100% 100% 100%   Weight:       Height:         ROUTINE IP LABS (Last four results)  BMP  Recent Labs   Lab 07/20/20  1011 07/20/20  1007 07/20/20  0828 07/20/20  0506 07/20/20  0232 07/20/20  0018  07/19/20  1613     --   --  140 141 140   < > 134   POTASSIUM 3.9 3.6 3.2* 3.3* 2.8* 3.1*   < > 3.9   CHLORIDE 107  --   --  104 104  --   --  103   BRYANNA 8.3*  --   --  9.1 9.0  --   --  8.6   CO2 25  --   --  20 23  --   --  26   BUN 30  --   --  28 28  --   --  34*   CR 1.43*  --   --  1.37* 1.33*  --   --  1.46*   *  --   --  352* 324* 334*   < > 226*    < > = values in this interval not displayed.     CBC  Recent Labs   Lab 07/20/20  1011 07/20/20  0506 07/20/20  0232 07/20/20  0018 07/19/20  1613   WBC 12.0* 11.8* 12.5*  --   --  7.7   RBC 2.48* 2.75* 2.45*  --   --  3.56*   HGB 7.5* 8.1* 7.4* 8.9*   < > 10.8*   HCT 22.5* 25.5* 23.6*  --   --  34.0*   MCV 91 93 96  --   --  96   MCH 30.2 29.5 30.2  --   --  30.3   MCHC 33.3 31.8 31.4*  --   --  31.8   RDW 15.9* 15.4* 14.6  --   --  14.7    191 163 43*   < > 99*    < > = values in this interval not displayed.     INR  Recent Labs   Lab 07/20/20  0506 07/20/20 0232 07/20/20  0018 07/19/20 2032   INR 1.35* 1.52* 1.59* 1.23*       Attestation: I, Fracisco Dorantes M.D., was immediately available by pager during the entirety of the procedure.  I reviewed the patient's chart and was in direct communication by phone with apheresis nursing staff during the TPE.    Fracisco Dorantes M.D.  Professor, Transfusion Medicine  Laboratory Medicine & Pathology  Pager: [phone]

## 2020-07-20 NOTE — ANESTHESIA PROCEDURE NOTES
ADARSH Probe Insertion Note:  Staff -   Anesthesiologist:  Warren Sellers MD      Performed By: anesthesiologist        Probe Number: 6  Probe Status PRE Insertion: NO obvious damage  Probe type:  Adult 3D    Bite block used:   Yes  Insertion Technique: Easy, no oropharyngeal manipulation  Insertion complications: None obvious    Billing Report:ADARSH report by Anesthesiologist (See Separate Report note)    Probe Status POST Removal: NO obvious damage

## 2020-07-20 NOTE — PLAN OF CARE
4E PT hold: Patient underwent OHT yesterday and is not currently appropriate for participation in PT. Will re-eval as appropriate.

## 2020-07-20 NOTE — ANESTHESIA PROCEDURE NOTES
Central Line Procedure Note  Staff -   Anesthesiologist:  Warren Sellers MD      Performed By: anesthesiologist        Location: In OR after induction  Procedure Start/Stop Times:     patient identified, IV checked, site marked, risks and benefits discussed, informed consent, monitors and equipment checked, pre-op evaluation and at physician/surgeon's request      Correct Patient: Yes      Correct Position: Yes      Correct Site: Yes      Correct Procedure: Yes      Correct Laterality:  Yes    Site Marked:  Yes  Line Placement:     Procedure:  Central Line    Insertion laterality:  Right    Insertion site:  Internal Jugular    Position:  Trendelenburg    Sterility preparation included the following: hand hygiene performed prior to central venous catheter insertion, maximum sterile barriers were used: cap, mask, sterile gown, sterile gloves, and large sterile sheet, antiseptic used during central venous catheter insertion and skin prep agent completely dried prior to procedure         Injection Technique:  Ultrasound guided    Sterile Ultrasound Technique:  Sterile probe cover and Sterile gel    Vein evaluated via U/S for patency/adequacy of catheter insertion and is adequate.  Using realtime U/S imaging the vein was punctured, and needle was observed entering vein on U/S      A permanent image is NOT entered into the patient's record.      Local skin infiltration:  None    Catheter size:  9 Fr, 2 lumen 11.5 cm (MAC)    Catheter length at skin (cm):  11    Cath secured with: suture      Dressing:  Tegaderm and Biopatch    Complications:  None obvious    Blood aspirated all lumens: Yes      All Lumens Flushed: Yes      Tip termination: other      Verification method:  Placement to be verified post-op{

## 2020-07-20 NOTE — BRIEF OP NOTE
[facility], [place]    Brief Operative Note    Pre-operative diagnosis: Heart failure  Post-operative diagnosis Same as pre-operative diagnosis    Procedure: Procedure(s):  REDO MEDIAN STERNOTOMY, TRANSPLANT, ORTHOTOPIC HEART, RECIPIENT, ON PUMP OXYGENATOR, REMOVAL OF CARDIAC DEFIBRILLATOR, REMOVAL OF BALLOON PUMP  Surgeon: Surgeon(s) and Role:     * Griselli, Massimo, MD - Primary     * Eladia Alonzo MD - Assisting     * Giorgio Corral MD - Fellow - Assisting     * Anna Soriano MD - Fellow - Assisting  Anesthesia: General   Estimated blood loss: 1000cc  Drains: Pleural left and right, med x 1  Specimens:   ID Type Source Tests Collected by Time Destination   A : Native Heart Tissue Heart SURGICAL PATHOLOGY EXAM Griselli, Massimo, MD 7/20/2020 12:21 AM    B : ICD Generator and 1 Lead Other (specify in comments) Other OR DOCUMENTATION ONLY Griselli, Massimo, MD 7/20/2020 12:47 AM      Findings:   heart failure.  Complications: None.  Implants:   Implant Name Type Inv. Item Serial No.  Lot No. LRB No. Used Action   [employer]* [id number] RELIANCE 4-SITE SG [id number] Leads  [id number] [employer]   1 Explanted   [employer]* [id number] INCEPTA VR [id number] ICD  [id number] [employer]   1 Explanted

## 2020-07-20 NOTE — PROCEDURES
Small Bowel Feeding Tube Placement Assessment  Reason for Feeding Tube Placement: Team request for post pyloric feeding tube   Cortrak Start Time: 145   Cortrak End Time: 215  Medicine Delivered During Procedure: Lidocaine gel   Placement Successful: Presume post-pyloric (pending AXR confirmation).  Procedure Complications: None  Final Placement David at exit of nare: 85 cm  Face to Face time with patient: 30 min       Bridle Placement:   Reason for bridle placement: Securement of FT    Medicine delivered during procedure: lidocaine gel   Procedure: Unsuccessful. Unable to connect bridle probes behind septum, possible deviated septum. Secured tube with adhesive andres clip.    Location of top of clip on FT: @ 86 cm marker   Condition of nose/skin at time of bridle placement: Unremarkable   Face to Face time with patient: 5 minutes.    Nadiya Stinson RD, LD  [id number]  Pgr: [phone]

## 2020-07-20 NOTE — PLAN OF CARE
OT 4E. Cancel. Pt s/p OHT yesterday and not yet medically appropriate for therapy. Will reschedule

## 2020-07-20 NOTE — PROGRESS NOTES
Major Shift Events:   -Patient admitted from OR after heart txp  -Frequent runs of VTach overnight; electrolytes checked and replaced per protocol  -Balloon Pump 1:1  -Large amount of sanguinous drainage from Chest tubes; see I/O  -Blood product given: 1U PRBCs; 1U Platelets  -500 ml Albumin given for rising lactic    Plan: Continue to monitor  For vital signs and complete assessments, please see documentation flowsheets.   ?    Admitted/transferred from: OR  Reason for admission/transfer: Heart Txp  2 RN skin assessment: completed by Jessy Em  Result of skin assessment and interventions/actions: No pressure injuries or wounds noted; surgical dressings CDI. Unable to turn consistently d/t hemodynamic instability  Height, weight, drug calc weight: Done  Patient belongings (see Flowsheet). In room; patient had belongings moved from room 4-515  MDRO education added to care plan: yes

## 2020-07-20 NOTE — PROGRESS NOTES
Pt received HEART transplant on 07.19.2020, donor ID [id number], removed from [facility] transplant waitlist.

## 2020-07-20 NOTE — PHARMACY-CONSULT NOTE
Pharmacy Tube Feeding Consult    Medication reviewed for administration by feeding tube and for potential food/drug interactions.    Recommendation: No changes are needed at this time.     Pharmacy will continue to follow as new medications are ordered.    Cintia Rodriguez RP

## 2020-07-20 NOTE — ANESTHESIA CARE TRANSFER NOTE
Patient: Lucian Henderson Sr.    Procedure(s):  REDO MEDIAN STERNOTOMY, TRANSPLANT, ORTHOTOPIC HEART, RECIPIENT, ON PUMP OXYGENATOR, REMOVAL OF CARDIAC DEFIBRILLATOR AND LEAD    Diagnosis: * No pre-op diagnosis entered *  Diagnosis Additional Information: No value filed.    Anesthesia Type:   General     Note:  Airway :ETT  Patient transferred to:PACU  Comments: To ICU, VSS, airway patent, RN at bedside.Handoff Report: Identifed the Patient, Identified the Reponsible Provider, Reviewed the pertinent medical history, Discussed the surgical course, Reviewed Intra-OP anesthesia mangement and issues during anesthesia, Set expectations for post-procedure period and Allowed opportunity for questions and acknowledgement of understanding      Vitals: (Last set prior to Anesthesia Care Transfer)    CRNA VITALS  7/20/2020 0124 - 7/20/2020 0213      7/20/2020             EKG:  AV Paced                Electronically Signed By: ELIJAH Lopez CRNA  July 20, 2020  2:13 AM

## 2020-07-20 NOTE — ANESTHESIA POSTPROCEDURE EVALUATION
Anesthesia POST Procedure Evaluation    Patient: Lucian Henderson .   MRN:     [id number] Gender:   male   Age:    64 year old :      1955        Preoperative Diagnosis: * No pre-op diagnosis entered *   Procedure(s):  REDO MEDIAN STERNOTOMY, TRANSPLANT, ORTHOTOPIC HEART, RECIPIENT, ON PUMP OXYGENATOR, REMOVAL OF CARDIAC DEFIBRILLATOR AND LEAD   Postop Comments: No value filed.     Anesthesia Type: General       Disposition: ICU            ICU Sign Out: Anesthesiologist/ICU physician sign out WAS performed   Postop Pain Control:    PONV:    Neuro/Psych: Uneventful            Sign Out: PLANNED postop sedation   Airway/Respiratory: Uneventful            Sign Out: AIRWAY IN SITU/Resp. Support; Acceptable/Baseline resp. status               Airway in situ/Resp. Support: ETT                 Reason: Planned Pre-op   CV/Hemodynamics: Uneventful            Sign Out: Acceptable CV status   Other NRE: NONE   DID A NON-ROUTINE EVENT OCCUR? No         Last Anesthesia Record Vitals:  CRNA VITALS  2020 0124 - 2020 0224      2020             Pulse:  108    ART BP:  106/46          Last PACU Vitals:  Vitals Value Taken Time   BP     Temp     Pulse     Resp 14 2020  3:12 AM   SpO2 100 % 2020  3:12 AM   Temp src     NIBP     Pulse     SpO2     Resp     Temp     Ht Rate     Temp 2     Vitals shown include unvalidated device data.      Electronically Signed By: Warren Sellers MD, 2020, 3:13 AM

## 2020-07-20 NOTE — PROGRESS NOTES
CLINICAL NUTRITION SERVICES - BRIEF NOTE     S/p heart transplant on 7/19, intubated/sedated. Lactate down trending and pressor requirements decreasing.   Team request to start nutrition support.     Nutrition changes today:  Start trophic feeds, Nutren 1.5 @ 10 ml/hr via post-pyloric feeding tube   FWF of 30 ml q4h for tube patency  Certavite daily for micronutrient supplementation    Nadiya Stinson, RD, LD  [id number]  Pgr: [phone]

## 2020-07-21 ENCOUNTER — APPOINTMENT (OUTPATIENT)
Dept: GENERAL RADIOLOGY | Facility: CLINIC | Age: 65
DRG: 001 | End: 2020-07-21
Attending: INTERNAL MEDICINE
Payer: COMMERCIAL

## 2020-07-21 LAB
ALBUMIN SERPL-MCNC: 2.9 G/DL (ref 3.4–5)
ALP SERPL-CCNC: 67 U/L (ref 40–150)
ALT SERPL W P-5'-P-CCNC: 34 U/L (ref 0–70)
ANION GAP SERPL CALCULATED.3IONS-SCNC: 2 MMOL/L (ref 3–14)
ANION GAP SERPL CALCULATED.3IONS-SCNC: 3 MMOL/L (ref 3–14)
ANION GAP SERPL CALCULATED.3IONS-SCNC: 5 MMOL/L (ref 3–14)
ANION GAP SERPL CALCULATED.3IONS-SCNC: 5 MMOL/L (ref 3–14)
AST SERPL W P-5'-P-CCNC: 72 U/L (ref 0–45)
BASE DEFICIT BLDA-SCNC: 1.6 MMOL/L
BASE DEFICIT BLDV-SCNC: 0.4 MMOL/L
BASE DEFICIT BLDV-SCNC: 0.7 MMOL/L
BASE DEFICIT BLDV-SCNC: 3.9 MMOL/L
BASE EXCESS BLDA CALC-SCNC: 2.3 MMOL/L
BASE EXCESS BLDA CALC-SCNC: 2.7 MMOL/L
BASE EXCESS BLDV CALC-SCNC: 2.7 MMOL/L
BASE EXCESS BLDV CALC-SCNC: 3 MMOL/L
BILIRUB DIRECT SERPL-MCNC: 0.4 MG/DL (ref 0–0.2)
BILIRUB SERPL-MCNC: 1 MG/DL (ref 0.2–1.3)
BUN SERPL-MCNC: 30 MG/DL (ref 7–30)
BUN SERPL-MCNC: 32 MG/DL (ref 7–30)
BUN SERPL-MCNC: 34 MG/DL (ref 7–30)
BUN SERPL-MCNC: 36 MG/DL (ref 7–30)
CA-I BLD-MCNC: 3.9 MG/DL (ref 4.4–5.2)
CA-I BLD-MCNC: 4.4 MG/DL (ref 4.4–5.2)
CA-I BLD-MCNC: 4.5 MG/DL (ref 4.4–5.2)
CALCIUM SERPL-MCNC: 7.6 MG/DL (ref 8.5–10.1)
CALCIUM SERPL-MCNC: 7.8 MG/DL (ref 8.5–10.1)
CALCIUM SERPL-MCNC: 8.4 MG/DL (ref 8.5–10.1)
CALCIUM SERPL-MCNC: 8.6 MG/DL (ref 8.5–10.1)
CELL TYPE ALLO: NORMAL
CELL TYPE AUTO: NORMAL
CHANNELSHIFTALLOB1: -42
CHANNELSHIFTALLOB2: -33
CHANNELSHIFTALLOT1: -35
CHANNELSHIFTALLOT2: -36
CHANNELSHIFTAUTOB1: -10
CHANNELSHIFTAUTOB2: -11
CHANNELSHIFTAUTOT1: -16
CHANNELSHIFTAUTOT2: -16
CHLORIDE SERPL-SCNC: 109 MMOL/L (ref 94–109)
CHLORIDE SERPL-SCNC: 110 MMOL/L (ref 94–109)
CHLORIDE SERPL-SCNC: 112 MMOL/L (ref 94–109)
CHLORIDE SERPL-SCNC: 112 MMOL/L (ref 94–109)
CO2 SERPL-SCNC: 24 MMOL/L (ref 20–32)
CO2 SERPL-SCNC: 27 MMOL/L (ref 20–32)
CO2 SERPL-SCNC: 28 MMOL/L (ref 20–32)
CO2 SERPL-SCNC: 28 MMOL/L (ref 20–32)
COMMENT ALLOB2: NORMAL
CREAT SERPL-MCNC: 1.47 MG/DL (ref 0.66–1.25)
CREAT SERPL-MCNC: 1.48 MG/DL (ref 0.66–1.25)
CREAT SERPL-MCNC: 1.58 MG/DL (ref 0.66–1.25)
CREAT SERPL-MCNC: 1.62 MG/DL (ref 0.66–1.25)
CROSSMATCHDATEALLO: NORMAL
CROSSMATCHDATEAUTO: NORMAL
DONOR ALLO: NORMAL
DONOR AUTO: NORMAL
DONOR IDENTIFICATION: NORMAL
DONORCELLDATE ALLO: NORMAL
DONORCELLDATE AUTO: NORMAL
DSA COMMENTS: NORMAL
DSA PRESENT: NO
DSA TEST METHOD: NORMAL
ERYTHROCYTE [DISTWIDTH] IN BLOOD BY AUTOMATED COUNT: 17.5 % (ref 10–15)
ERYTHROCYTE [DISTWIDTH] IN BLOOD BY AUTOMATED COUNT: 17.6 % (ref 10–15)
ERYTHROCYTE [DISTWIDTH] IN BLOOD BY AUTOMATED COUNT: 17.7 % (ref 10–15)
GFR SERPL CREATININE-BSD FRML MDRD: 43 ML/MIN/{1.73_M2}
GFR SERPL CREATININE-BSD FRML MDRD: 45 ML/MIN/{1.73_M2}
GFR SERPL CREATININE-BSD FRML MDRD: 48 ML/MIN/{1.73_M2}
GFR SERPL CREATININE-BSD FRML MDRD: 49 ML/MIN/{1.73_M2}
GLUCOSE BLDC GLUCOMTR-MCNC: 114 MG/DL (ref 70–99)
GLUCOSE BLDC GLUCOMTR-MCNC: 129 MG/DL (ref 70–99)
GLUCOSE BLDC GLUCOMTR-MCNC: 131 MG/DL (ref 70–99)
GLUCOSE BLDC GLUCOMTR-MCNC: 135 MG/DL (ref 70–99)
GLUCOSE BLDC GLUCOMTR-MCNC: 145 MG/DL (ref 70–99)
GLUCOSE BLDC GLUCOMTR-MCNC: 150 MG/DL (ref 70–99)
GLUCOSE BLDC GLUCOMTR-MCNC: 151 MG/DL (ref 70–99)
GLUCOSE BLDC GLUCOMTR-MCNC: 152 MG/DL (ref 70–99)
GLUCOSE BLDC GLUCOMTR-MCNC: 154 MG/DL (ref 70–99)
GLUCOSE BLDC GLUCOMTR-MCNC: 155 MG/DL (ref 70–99)
GLUCOSE BLDC GLUCOMTR-MCNC: 158 MG/DL (ref 70–99)
GLUCOSE BLDC GLUCOMTR-MCNC: 159 MG/DL (ref 70–99)
GLUCOSE BLDC GLUCOMTR-MCNC: 162 MG/DL (ref 70–99)
GLUCOSE BLDC GLUCOMTR-MCNC: 178 MG/DL (ref 70–99)
GLUCOSE BLDC GLUCOMTR-MCNC: 186 MG/DL (ref 70–99)
GLUCOSE BLDC GLUCOMTR-MCNC: 187 MG/DL (ref 70–99)
GLUCOSE BLDC GLUCOMTR-MCNC: 197 MG/DL (ref 70–99)
GLUCOSE BLDC GLUCOMTR-MCNC: 198 MG/DL (ref 70–99)
GLUCOSE BLDC GLUCOMTR-MCNC: 204 MG/DL (ref 70–99)
GLUCOSE BLDC GLUCOMTR-MCNC: 217 MG/DL (ref 70–99)
GLUCOSE BLDC GLUCOMTR-MCNC: 217 MG/DL (ref 70–99)
GLUCOSE BLDC GLUCOMTR-MCNC: 95 MG/DL (ref 70–99)
GLUCOSE SERPL-MCNC: 117 MG/DL (ref 70–99)
GLUCOSE SERPL-MCNC: 160 MG/DL (ref 70–99)
GLUCOSE SERPL-MCNC: 182 MG/DL (ref 70–99)
GLUCOSE SERPL-MCNC: 195 MG/DL (ref 70–99)
HCO3 BLD-SCNC: 23 MMOL/L (ref 21–28)
HCO3 BLD-SCNC: 27 MMOL/L (ref 21–28)
HCO3 BLD-SCNC: 27 MMOL/L (ref 21–28)
HCO3 BLDV-SCNC: 21 MMOL/L (ref 21–28)
HCO3 BLDV-SCNC: 24 MMOL/L (ref 21–28)
HCO3 BLDV-SCNC: 24 MMOL/L (ref 21–28)
HCO3 BLDV-SCNC: 27 MMOL/L (ref 21–28)
HCO3 BLDV-SCNC: 28 MMOL/L (ref 21–28)
HCT VFR BLD AUTO: 25.3 % (ref 40–53)
HCT VFR BLD AUTO: 27.4 % (ref 40–53)
HCT VFR BLD AUTO: 28.3 % (ref 40–53)
HGB BLD-MCNC: 8.2 G/DL (ref 13.3–17.7)
HGB BLD-MCNC: 9.1 G/DL (ref 13.3–17.7)
HGB BLD-MCNC: 9.4 G/DL (ref 13.3–17.7)
LACTATE BLD-SCNC: 0.6 MMOL/L (ref 0.7–2)
LACTATE BLD-SCNC: 0.7 MMOL/L (ref 0.7–2)
LACTATE BLD-SCNC: 0.8 MMOL/L (ref 0.7–2)
LACTATE BLD-SCNC: 1 MMOL/L (ref 0.7–2)
LACTATE BLD-SCNC: 1.4 MMOL/L (ref 0.7–2)
MAGNESIUM SERPL-MCNC: 2.3 MG/DL (ref 1.6–2.3)
MAGNESIUM SERPL-MCNC: 2.4 MG/DL (ref 1.6–2.3)
MAGNESIUM SERPL-MCNC: 2.5 MG/DL (ref 1.6–2.3)
MCH RBC QN AUTO: 30.3 PG (ref 26.5–33)
MCH RBC QN AUTO: 30.7 PG (ref 26.5–33)
MCH RBC QN AUTO: 30.7 PG (ref 26.5–33)
MCHC RBC AUTO-ENTMCNC: 32.4 G/DL (ref 31.5–36.5)
MCHC RBC AUTO-ENTMCNC: 33.2 G/DL (ref 31.5–36.5)
MCHC RBC AUTO-ENTMCNC: 33.2 G/DL (ref 31.5–36.5)
MCV RBC AUTO: 91 FL (ref 78–100)
MCV RBC AUTO: 93 FL (ref 78–100)
MCV RBC AUTO: 95 FL (ref 78–100)
O2/TOTAL GAS SETTING VFR VENT: 40 %
ORGAN: NORMAL
OXYHGB MFR BLD: 98 % (ref 92–100)
OXYHGB MFR BLDV: 71 %
OXYHGB MFR BLDV: 72 %
OXYHGB MFR BLDV: 74 %
OXYHGB MFR BLDV: 76 %
OXYHGB MFR BLDV: 76 %
PCO2 BLD: 35 MM HG (ref 35–45)
PCO2 BLD: 39 MM HG (ref 35–45)
PCO2 BLD: 42 MM HG (ref 35–45)
PCO2 BLDV: 35 MM HG (ref 40–50)
PCO2 BLDV: 39 MM HG (ref 40–50)
PCO2 BLDV: 39 MM HG (ref 40–50)
PCO2 BLDV: 41 MM HG (ref 40–50)
PCO2 BLDV: 42 MM HG (ref 40–50)
PH BLD: 7.42 PH (ref 7.35–7.45)
PH BLD: 7.42 PH (ref 7.35–7.45)
PH BLD: 7.45 PH (ref 7.35–7.45)
PH BLDV: 7.38 PH (ref 7.32–7.43)
PH BLDV: 7.4 PH (ref 7.32–7.43)
PH BLDV: 7.41 PH (ref 7.32–7.43)
PH BLDV: 7.42 PH (ref 7.32–7.43)
PH BLDV: 7.44 PH (ref 7.32–7.43)
PHOSPHATE SERPL-MCNC: 4.3 MG/DL (ref 2.5–4.5)
PHOSPHATE SERPL-MCNC: 4.3 MG/DL (ref 2.5–4.5)
PHOSPHATE SERPL-MCNC: 4.7 MG/DL (ref 2.5–4.5)
PLATELET # BLD AUTO: 128 10E9/L (ref 150–450)
PLATELET # BLD AUTO: 140 10E9/L (ref 150–450)
PLATELET # BLD AUTO: 141 10E9/L (ref 150–450)
PO2 BLD: 125 MM HG (ref 80–105)
PO2 BLD: 128 MM HG (ref 80–105)
PO2 BLD: 134 MM HG (ref 80–105)
PO2 BLDV: 38 MM HG (ref 25–47)
PO2 BLDV: 40 MM HG (ref 25–47)
PO2 BLDV: 41 MM HG (ref 25–47)
PO2 BLDV: 41 MM HG (ref 25–47)
PO2 BLDV: 43 MM HG (ref 25–47)
POS CUT OFF ALLO B: >93
POS CUT OFF ALLO T: >79
POS CUT OFF AUTO B: >93
POS CUT OFF AUTO T: >79
POTASSIUM SERPL-SCNC: 4.4 MMOL/L (ref 3.4–5.3)
POTASSIUM SERPL-SCNC: 4.6 MMOL/L (ref 3.4–5.3)
POTASSIUM SERPL-SCNC: 4.6 MMOL/L (ref 3.4–5.3)
POTASSIUM SERPL-SCNC: 5.6 MMOL/L (ref 3.4–5.3)
PROT SERPL-MCNC: 5.7 G/DL (ref 6.8–8.8)
RBC # BLD AUTO: 2.67 10E12/L (ref 4.4–5.9)
RBC # BLD AUTO: 2.96 10E12/L (ref 4.4–5.9)
RBC # BLD AUTO: 3.1 10E12/L (ref 4.4–5.9)
RESULT ALLO B1: NORMAL
RESULT ALLO B2: NORMAL
RESULT ALLO T1: NORMAL
RESULT ALLO T2: NORMAL
RESULT AUTO B1: NORMAL
RESULT AUTO B2: NORMAL
RESULT AUTO T1: NORMAL
RESULT AUTO T2: NORMAL
SA1 CELL: NORMAL
SA1 COMMENTS: NORMAL
SA1 HI RISK ABY: NORMAL
SA1 MOD RISK ABY: NORMAL
SA1 TEST METHOD: NORMAL
SA2 CELL: NORMAL
SA2 COMMENTS: NORMAL
SA2 HI RISK ABY UA: NORMAL
SA2 MOD RISK ABY: NORMAL
SA2 TEST METHOD: NORMAL
SERUM DATE ALLO B1: NORMAL
SERUM DATE ALLO B2: NORMAL
SERUM DATE ALLO T1: NORMAL
SERUM DATE ALLO T2: NORMAL
SERUM DATE AUTO B1: NORMAL
SERUM DATE AUTO B2: NORMAL
SERUM DATE AUTO T1: NORMAL
SERUM DATE AUTO T2: NORMAL
SODIUM SERPL-SCNC: 141 MMOL/L (ref 133–144)
SODIUM SERPL-SCNC: 142 MMOL/L (ref 133–144)
TESTMETHODALLO: NORMAL
TESTMETHODAUTO: NORMAL
TREATMENT ALLO B1: NORMAL
TREATMENT ALLO B2: NORMAL
TREATMENT ALLO T1: NORMAL
TREATMENT ALLO T2: NORMAL
TREATMENT AUTO B1: NORMAL
TREATMENT AUTO B2: NORMAL
TREATMENT AUTO T1: NORMAL
TREATMENT AUTO T2: NORMAL
UNACCEPTABLE ANTIGEN: NORMAL
UNOS CPRA: 0
WBC # BLD AUTO: 24.6 10E9/L (ref 4–11)
WBC # BLD AUTO: 25.4 10E9/L (ref 4–11)
WBC # BLD AUTO: 28.2 10E9/L (ref 4–11)

## 2020-07-21 PROCEDURE — 25800030 ZZH RX IP 258 OP 636: Performed by: STUDENT IN AN ORGANIZED HEALTH CARE EDUCATION/TRAINING PROGRAM

## 2020-07-21 PROCEDURE — C9399 UNCLASSIFIED DRUGS OR BIOLOG: HCPCS

## 2020-07-21 PROCEDURE — 25800030 ZZH RX IP 258 OP 636: Performed by: SURGERY

## 2020-07-21 PROCEDURE — 25000128 H RX IP 250 OP 636: Performed by: STUDENT IN AN ORGANIZED HEALTH CARE EDUCATION/TRAINING PROGRAM

## 2020-07-21 PROCEDURE — 82805 BLOOD GASES W/O2 SATURATION: CPT | Performed by: STUDENT IN AN ORGANIZED HEALTH CARE EDUCATION/TRAINING PROGRAM

## 2020-07-21 PROCEDURE — 94003 VENT MGMT INPAT SUBQ DAY: CPT

## 2020-07-21 PROCEDURE — 83605 ASSAY OF LACTIC ACID: CPT | Performed by: STUDENT IN AN ORGANIZED HEALTH CARE EDUCATION/TRAINING PROGRAM

## 2020-07-21 PROCEDURE — 36415 COLL VENOUS BLD VENIPUNCTURE: CPT | Performed by: INTERNAL MEDICINE

## 2020-07-21 PROCEDURE — 25000125 ZZHC RX 250: Performed by: SURGERY

## 2020-07-21 PROCEDURE — 25000132 ZZH RX MED GY IP 250 OP 250 PS 637: Performed by: SURGERY

## 2020-07-21 PROCEDURE — 85027 COMPLETE CBC AUTOMATED: CPT | Performed by: SURGERY

## 2020-07-21 PROCEDURE — 20000004 ZZH R&B ICU UMMC

## 2020-07-21 PROCEDURE — 82330 ASSAY OF CALCIUM: CPT | Performed by: STUDENT IN AN ORGANIZED HEALTH CARE EDUCATION/TRAINING PROGRAM

## 2020-07-21 PROCEDURE — 25000132 ZZH RX MED GY IP 250 OP 250 PS 637: Performed by: INTERNAL MEDICINE

## 2020-07-21 PROCEDURE — 25000125 ZZHC RX 250: Performed by: STUDENT IN AN ORGANIZED HEALTH CARE EDUCATION/TRAINING PROGRAM

## 2020-07-21 PROCEDURE — 00000146 ZZHCL STATISTIC GLUCOSE BY METER IP

## 2020-07-21 PROCEDURE — 80048 BASIC METABOLIC PNL TOTAL CA: CPT | Performed by: SURGERY

## 2020-07-21 PROCEDURE — 25000128 H RX IP 250 OP 636: Performed by: SURGERY

## 2020-07-21 PROCEDURE — 25000132 ZZH RX MED GY IP 250 OP 250 PS 637: Performed by: STUDENT IN AN ORGANIZED HEALTH CARE EDUCATION/TRAINING PROGRAM

## 2020-07-21 PROCEDURE — 27210429 ZZH NUTRITION PRODUCT INTERMEDIATE LITER

## 2020-07-21 PROCEDURE — 82330 ASSAY OF CALCIUM: CPT | Performed by: PEDIATRICS

## 2020-07-21 PROCEDURE — 94799 UNLISTED PULMONARY SVC/PX: CPT

## 2020-07-21 PROCEDURE — 71045 X-RAY EXAM CHEST 1 VIEW: CPT

## 2020-07-21 PROCEDURE — 83735 ASSAY OF MAGNESIUM: CPT | Performed by: SURGERY

## 2020-07-21 PROCEDURE — 80076 HEPATIC FUNCTION PANEL: CPT | Performed by: SURGERY

## 2020-07-21 PROCEDURE — 25800030 ZZH RX IP 258 OP 636: Performed by: PEDIATRICS

## 2020-07-21 PROCEDURE — 25000128 H RX IP 250 OP 636: Performed by: PEDIATRICS

## 2020-07-21 PROCEDURE — 84100 ASSAY OF PHOSPHORUS: CPT | Performed by: SURGERY

## 2020-07-21 PROCEDURE — 25000132 ZZH RX MED GY IP 250 OP 250 PS 637: Performed by: PEDIATRICS

## 2020-07-21 PROCEDURE — 99291 CRITICAL CARE FIRST HOUR: CPT | Mod: GC | Performed by: INTERNAL MEDICINE

## 2020-07-21 PROCEDURE — C9113 INJ PANTOPRAZOLE SODIUM, VIA: HCPCS | Performed by: SURGERY

## 2020-07-21 PROCEDURE — 87040 BLOOD CULTURE FOR BACTERIA: CPT | Performed by: INTERNAL MEDICINE

## 2020-07-21 PROCEDURE — 25000131 ZZH RX MED GY IP 250 OP 636 PS 637: Performed by: STUDENT IN AN ORGANIZED HEALTH CARE EDUCATION/TRAINING PROGRAM

## 2020-07-21 PROCEDURE — 83605 ASSAY OF LACTIC ACID: CPT | Performed by: PEDIATRICS

## 2020-07-21 PROCEDURE — 40000275 ZZH STATISTIC RCP TIME EA 10 MIN

## 2020-07-21 PROCEDURE — 82805 BLOOD GASES W/O2 SATURATION: CPT | Performed by: PEDIATRICS

## 2020-07-21 RX ORDER — NICOTINE POLACRILEX 4 MG
15-30 LOZENGE BUCCAL
Status: DISCONTINUED | OUTPATIENT
Start: 2020-07-21 | End: 2020-07-21

## 2020-07-21 RX ORDER — DEXTROSE MONOHYDRATE 25 G/50ML
25-50 INJECTION, SOLUTION INTRAVENOUS
Status: DISCONTINUED | OUTPATIENT
Start: 2020-07-21 | End: 2020-07-21

## 2020-07-21 RX ORDER — FUROSEMIDE 10 MG/ML
20 INJECTION INTRAMUSCULAR; INTRAVENOUS ONCE
Status: COMPLETED | OUTPATIENT
Start: 2020-07-21 | End: 2020-07-21

## 2020-07-21 RX ORDER — CEFTRIAXONE 2 G/1
2 INJECTION, POWDER, FOR SOLUTION INTRAMUSCULAR; INTRAVENOUS EVERY 24 HOURS
Status: COMPLETED | OUTPATIENT
Start: 2020-07-21 | End: 2020-07-24

## 2020-07-21 RX ORDER — CALCIUM CHLORIDE 100 MG/ML
1 INJECTION INTRAVENOUS; INTRAVENTRICULAR ONCE
Status: DISCONTINUED | OUTPATIENT
Start: 2020-07-21 | End: 2020-07-21

## 2020-07-21 RX ADMIN — EPINEPHRINE 0.03 MCG/KG/MIN: 1 INJECTION PARENTERAL at 21:14

## 2020-07-21 RX ADMIN — HUMAN INSULIN 4 UNITS/HR: 100 INJECTION, SOLUTION SUBCUTANEOUS at 18:54

## 2020-07-21 RX ADMIN — CEFEPIME HYDROCHLORIDE 2 G: 2 INJECTION, POWDER, FOR SOLUTION INTRAVENOUS at 05:30

## 2020-07-21 RX ADMIN — HUMAN INSULIN 5 UNITS/HR: 100 INJECTION, SOLUTION SUBCUTANEOUS at 10:12

## 2020-07-21 RX ADMIN — GABAPENTIN 100 MG: 100 CAPSULE ORAL at 07:28

## 2020-07-21 RX ADMIN — POLYETHYLENE GLYCOL 3350 17 G: 17 POWDER, FOR SOLUTION ORAL at 07:28

## 2020-07-21 RX ADMIN — CALCIUM GLUCONATE 3 G: 98 INJECTION, SOLUTION INTRAVENOUS at 10:12

## 2020-07-21 RX ADMIN — PROPOFOL 10 MCG/KG/MIN: 10 INJECTION, EMULSION INTRAVENOUS at 06:17

## 2020-07-21 RX ADMIN — FUROSEMIDE 20 MG: 10 INJECTION, SOLUTION INTRAVENOUS at 05:44

## 2020-07-21 RX ADMIN — VANCOMYCIN HYDROCHLORIDE 1500 MG: 10 INJECTION, POWDER, LYOPHILIZED, FOR SOLUTION INTRAVENOUS at 01:37

## 2020-07-21 RX ADMIN — HUMAN INSULIN 6 UNITS/HR: 100 INJECTION, SOLUTION SUBCUTANEOUS at 01:43

## 2020-07-21 RX ADMIN — PANTOPRAZOLE SODIUM 40 MG: 40 INJECTION, POWDER, FOR SOLUTION INTRAVENOUS at 07:28

## 2020-07-21 RX ADMIN — NYSTATIN 1000000 UNITS: 100000 SUSPENSION ORAL at 07:28

## 2020-07-21 RX ADMIN — MYCOPHENOLATE MOFETIL 1500 MG: 500 INJECTION, POWDER, LYOPHILIZED, FOR SOLUTION INTRAVENOUS at 20:47

## 2020-07-21 RX ADMIN — PROPOFOL 15 MCG/KG/MIN: 10 INJECTION, EMULSION INTRAVENOUS at 07:28

## 2020-07-21 RX ADMIN — MULTIVITAMIN 15 ML: LIQUID ORAL at 07:28

## 2020-07-21 RX ADMIN — DOCUSATE SODIUM AND SENNOSIDES 1 TABLET: 8.6; 5 TABLET ORAL at 20:55

## 2020-07-21 RX ADMIN — ISOPROTERENOL HYDROCHLORIDE 0.01 MCG/KG/MIN: 0.2 INJECTION, SOLUTION INTRAMUSCULAR; INTRAVENOUS at 22:39

## 2020-07-21 RX ADMIN — CEFTRIAXONE SODIUM 2 G: 2 INJECTION, POWDER, FOR SOLUTION INTRAMUSCULAR; INTRAVENOUS at 14:46

## 2020-07-21 RX ADMIN — PREDNISONE 40 MG: 20 TABLET ORAL at 20:55

## 2020-07-21 RX ADMIN — METHYLPREDNISOLONE SODIUM SUCCINATE 125 MG: 125 INJECTION, POWDER, FOR SOLUTION INTRAMUSCULAR; INTRAVENOUS at 00:42

## 2020-07-21 RX ADMIN — ACETAMINOPHEN 975 MG: 325 TABLET, FILM COATED ORAL at 11:29

## 2020-07-21 RX ADMIN — ACETAMINOPHEN 975 MG: 325 TABLET, FILM COATED ORAL at 20:55

## 2020-07-21 RX ADMIN — NYSTATIN 1000000 UNITS: 100000 SUSPENSION ORAL at 20:55

## 2020-07-21 RX ADMIN — ISOPROTERENOL HYDROCHLORIDE 0.01 MCG/KG/MIN: 0.2 INJECTION, SOLUTION INTRAMUSCULAR; INTRAVENOUS at 03:20

## 2020-07-21 RX ADMIN — MYCOPHENOLATE MOFETIL 1500 MG: 500 INJECTION, POWDER, LYOPHILIZED, FOR SOLUTION INTRAVENOUS at 06:15

## 2020-07-21 RX ADMIN — Medication 75 MCG/HR: at 14:23

## 2020-07-21 RX ADMIN — NYSTATIN 1000000 UNITS: 100000 SUSPENSION ORAL at 11:29

## 2020-07-21 RX ADMIN — SODIUM PHOSPHATE, MONOBASIC, MONOHYDRATE AND SODIUM PHOSPHATE, DIBASIC, ANHYDROUS 15 MMOL: 276; 142 INJECTION, SOLUTION INTRAVENOUS at 01:29

## 2020-07-21 RX ADMIN — EPINEPHRINE 0.04 MCG/KG/MIN: 1 INJECTION PARENTERAL at 11:29

## 2020-07-21 RX ADMIN — PREDNISONE 40 MG: 20 TABLET ORAL at 07:28

## 2020-07-21 RX ADMIN — NYSTATIN 1000000 UNITS: 100000 SUSPENSION ORAL at 17:30

## 2020-07-21 RX ADMIN — PROPOFOL 35 MCG/KG/MIN: 10 INJECTION, EMULSION INTRAVENOUS at 00:42

## 2020-07-21 RX ADMIN — VANCOMYCIN HYDROCHLORIDE 1500 MG: 10 INJECTION, POWDER, LYOPHILIZED, FOR SOLUTION INTRAVENOUS at 20:54

## 2020-07-21 RX ADMIN — DOCUSATE SODIUM AND SENNOSIDES 1 TABLET: 8.6; 5 TABLET ORAL at 07:28

## 2020-07-21 RX ADMIN — ACETAMINOPHEN 975 MG: 325 TABLET, FILM COATED ORAL at 03:20

## 2020-07-21 ASSESSMENT — ACTIVITIES OF DAILY LIVING (ADL)
ADLS_ACUITY_SCORE: 15

## 2020-07-21 ASSESSMENT — MIFFLIN-ST. JEOR: SCORE: 1513

## 2020-07-21 NOTE — PROGRESS NOTES
D: POD #2 OHTx. Remained sedated/intubated, on propofol/fent. Arousable. Followed commands, moved extremities. Hemodynamically stable, on epi/NE. SC IABP in place. UOP ok.  min. K+ normalized. Hyperglycemia improving w/ insulin gtt/lantus. Feeding tube repositioned multiple times by dietician, now gastric.  I: As above. Hygiene care provided. Spouse updated re: status. MD updated re: status/labs.  A: No acute event.  P: Continue to monitor. Notify MD of changes/concerns. Continue to wean N.O. to 5 ppm tonight. Pending echo tomorrow (7/22). Pending SC IABP and LA line removal per CVTS.

## 2020-07-21 NOTE — PROGRESS NOTES
[facility]   Cardiology II Service / Advanced Heart Failure  Progress note    Lucian Morillo  : 1955  MRN # [id number]    ADMIT DATE: 7/3/2020    Changes today  - Limited bedside TTE  shows minimal change in LV fxn, EF 25-30%, from 20-25%   - TTE tomorrow AM, expect continued improvement of EF   - Leave in IABP today, reassess possible removal  based on TTE and hemodynamics    - Wean Lalito down to 5 and hold there      - Follow up CORRINE (will result  in PM at earliest, no additional PLEX indicated per transfusion medicine, will consider starting argatroban or bival  if safe from surgical standpoint)     ASSESSMENT & PLAN:  Lucian Henderson Sr. is a 64 year old male with a PMH of HTN, DMII, obesity, CKD, CHANTEL, ischemic cardiomyopathy and severe LV systolic dysfunction s/p 3V CABG () and primary prevention ICD ( 4/2/15). Currently he presents with 3 days of worsening fatigue and SOB with minimal exertion. Patient claims he could walk 1-2 blocks last week but has been barely able to ambulate home recently.     Admitted for possible LVAD/Transplant work up. IABP and listed status 2 for heart transplant . HIT + , underwent PLEX, s/p . Post operative course complicated by graft dysfunction and VT.     Date RA MPAP PCWP SV02 Jacy CO Jacy CI MAP SVR PVR Intervention   20 8 50 38 49 2.3 1.28 89 2400 5.2    20  6 48 28 35 4.0 2.1 69 1300 2.2 Nipride, PO afterload reduction    7/10 9 56/34 32 68 5.1 2.7 68 900 1.8 Nipride 1, PO afterload reduction    14 65/42/50 36 60 4.3 2.2 89 1100 1.8 Nipride 0.25, PO afterload reduction    7/15 8 52/28/36 24 66 4.1 2.2 100 1200 2.9 IABP 1:1, PO afterload reduction     7 50/24/33 24 70 5.6 3.0 94 1200 1.6 IABP 1:1, Dopamine 2.5    9 56//35 22 62 4.8 2.6 75 1100 2.7 IABP 1:1    15  16 58 5.2 2.8 61 746 1.9 IABP 1:1, Epi 0.05, NE 0.03, Vas    10  16 72 6.0 3.5 71 748  0.83 IABP 1;1, Epi 0.05, NE 0.05       # ICM s/p 3V CABG (2008) and primary prevention ICD ( 4/2/15)  # HFrEF Stage D, NYHA III-IV s/p OHT 7/19  # Ambulatory cardiogenic shock  # Arrhythmia: HF associated VT/VF, VT (7/20, post op from OHT)   # Graft dysfunction (EF 20-25%)   Graft Function: TTE POD#1 w/ LVEF 20-25%, RV severely reduced, suspect 2/2 to ischemic time, minimal change in LV fxn 7/21, consider IABP wean tomorrow    --Volume: slightly volume up (goal CVP 10-12), lasix PRN   --BP: MAP goal >65   --HR: Goal >100,   -->isopreterenol at 0.01 (can add terbutaline 10 mg PO Q6H to wean in coming days)   -->temporary pacer set at  given VT     Pressors/Inotropes:  -continue NE and epi, minimal weaning today    -NO at 20, wean to 5 7/21    Immunosuppression:  -s/p Simulect on POD#0, second dose POD#4 (7/23)  -MMF 1500 mg BID  -s/p solumedrol 125 mg q8h  --> transitioned to prednisone  w/ taper   -will start Tacro later   -routine endomyocardial bx 7 days post transplant (7/27)      Antibiotics:   - continue vancomycin (will get 3 days)  - continue cefepime (if pt came from home w)  - Nystatin Swish&Swallow  - CMV: R pending / D+  - EBV: R pending / D+  - f/u blood cx   - donor blood cx growing strep salivarius in 1/2 bottles  -->transplant ID consult 7/21    # Atrial Flutter with RVR  Patient went into Atrial flutter with RVR with rates around 150 overnight on 7/7/2020 at around 2 am. Converted to NSR with amiodarone, which was stopped 7/19 given OHT.     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 Terrazas.     Héctor Smith MD   Cardiology Fellow, PGY-5  [phone]  "  ..........................................................................................................................................................  Subjective:  SARITA overnight. No arrhythmias. Lactate has normalized. Pressors stable.       PHYSICAL EXAM:  BP (!) 153/50   Pulse 80   Temp 99.3  F (37.4  C) (Pulmonary Artery)   Resp 11   Ht 1.626 m (5' 4\")   Wt 82.2 kg (181 lb 3.5 oz)   SpO2 100%   BMI 31.11 kg/m    GENERAL: Intubated and sedated   NECK: Supple and without lymphadenopathy. JVP 11 cm  CV: S1/S2 heard without murmur   RESPIRATORY: Mechanical breath sounds   GI: Soft and distended. No tenderness, rebound, guarding. No palpable organomegaly.   EXTREMITIES: Peripheral edema trace.   NEUROLOGIC: Follows commands, moves all four extremities   MUSCULOSKELETAL: No joint swelling or tenderness.   SKIN: No jaundice. No acute rashes or lesions.     ROS:   12-pt ROS otherwise negative except as noted above    PMH:  Past Medical History:   Diagnosis Date     CAD (coronary artery disease)      CHF (congestive heart failure) (H)      CKD (chronic kidney disease), stage III (H)      Cortical cataract of both eyes      Diabetes (H)      Hyperlipidemia      Hypertension      Ischemic cardiomyopathy      Obesity      CHANTEL (obstructive sleep apnea)     occas cpap     Osteoarthritis        PSH:  Past Surgical History:   Procedure Laterality Date     C CABG, ARTERY-VEIN, THREE  02/2008     CATARACT IOL, RT/LT       COLONOSCOPY N/A 8/7/2019    Procedure: COLONOSCOPY, WITH POLYPECTOMY AND BIOPSY;  Surgeon: Chauncey Morataya MD;  Location:  GI     CV RIGHT HEART CATH N/A 3/25/2019    Procedure: CV RIGHT HEART CATH;  Surgeon: Moises Santos MD;  Location:  HEART CARDIAC CATH LAB     CV RIGHT HEART CATH N/A 7/10/2019    Procedure: CV RIGHT HEART CATH;  Surgeon: Jak Mccabe MD;  Location:  HEART CARDIAC CATH LAB     CV RIGHT HEART CATH N/A 7/8/2020    Procedure: Right Heart Cath with Leave In [place] " already has ICU load;  Surgeon: Jak Mccabe MD;  Location:  HEART CARDIAC CATH LAB     EXTRACTION(S) DENTAL Left 7/13/2020    Procedure: EXTRACTION, TOOTH #11, 12, 13, 15, and 29;  Surgeon: Monica Chao DDS;  Location:  OR     IMPLANT AUTOMATIC IMPLANTABLE CARDIOVERTER DEFIBRILLATOR       INSERT INTRAAORTIC BALLOON PUMP N/A 7/16/2020    Procedure: Subclavian Intra-Aortic Balloon Pump Placement;  Surgeon: Allan Sparrow MD;  Location: [facility] OR     PHACOEMULSIFICATION CLEAR CORNEA WITH STANDARD IOL, VITRECTOMY PARSPLANA 25 GAGUE, COMBINED Left 12/10/2019    Procedure: PHACOEMULSIFICATION, CATARACT, CLEAR CORNEAL INCISION APPROACH, W STD INTRAOCULAR LENS IMPLANT INSERT + VITRECTOMY BY PARS PLANA  USING 25-GAUGE INSTRUMENTS. ENDOLASER, INFUSION OF 20% SF6 GAS;  Surgeon: Triny Bunch MD;  Location: [facility] OR     PICC INSERTION Right 07/11/2020    basilic 44 cm total        MEDICATIONS:  Prior to Admission Medications   Prescriptions Last Dose Informant Patient Reported? Taking?   aspirin 81 MG tablet  Self No No   Sig: Take 1 tablet (81 mg) by mouth daily   atorvastatin (LIPITOR) 40 MG tablet  Self No No   Sig: Take 1 tablet (40 mg) by mouth daily   blood glucose (ACCU-CHEK SMARTVIEW) test strip  Self No No   Sig: USE TO TEST THREE TIMES DAILY AS DIRECTED   blood glucose (NO BRAND SPECIFIED) test strip  Self No No   Sig: Use to test blood sugar 2 times daily or as directed.   blood glucose monitoring (ACCU-CHEK FELIPE SMARTVIEW) meter device kit  Self No No   Sig: Use to test blood sugar 3-4 times daily, as directed.   blood glucose monitoring (ONE TOUCH DELICA) lancets  Self No No   Sig: Use to test blood sugars 2 times daily.   clopidogrel (PLAVIX) 75 MG tablet  Self No No   Sig: Take 1 tablet (75 mg) by mouth daily   exenatide ER (BYDUREON) 2 MG pen  Self No No   Sig: Inject 2 mg Subcutaneous every 7 days   furosemide (LASIX) 20 MG tablet  Self No No   Sig: Take 2 tablets (40 mg) by mouth 2 times  daily   glimepiride (AMARYL) 4 MG tablet  Self No No   Sig: Take 1 tablet (4 mg) by mouth every morning (before breakfast)   hydrALAZINE (APRESOLINE) 25 MG tablet  Self No No   Sig: Take 1 tablet (25 mg) by mouth 3 times daily   insulin glargine (LANTUS SOLOSTAR) 100 UNIT/ML pen  Self No No   Sig: Take 8 units in the morning and 40 units in the evening   insulin pen needle (B-D U/F) 31G X 5 MM miscellaneous  Self No No   Si Units by Device route 2 times daily Use 2 pen needles daily or as directed.   isosorbide mononitrate (IMDUR) 60 MG 24 hr tablet  Self No No   Sig: Take 1 tablet (60 mg) by mouth daily   losartan (COZAAR) 50 MG tablet  Spouse/Significant Other No No   Sig: Take 1 tablet (50 mg) by mouth daily   nitroglycerin (NITROSTAT) 0.4 MG SL tablet  Self No No   Sig: Place 1 tablet (0.4 mg) under the tongue every 5 minutes as needed for chest pain If you are still having symptoms after 3 doses (15 minutes) call 911.   order for DME  Self No No   Sig: Auto CPAP 8-15 cmH20      Facility-Administered Medications Last Administration Doses Remaining   erythromycin (ROMYCIN) ophthalmic ointment None recorded    lidocaine (PF) (XYLOCAINE) 2 % injection 10 mL None recorded 1           ALLERGIES:     Allergies   Allergen Reactions     Heparin Heparin Induced Thrombocytopenia     HIT screen sent 2020. No heparin products until resulted     No Known Drug Allergies        FAMILY HISTORY:  Family History   Problem Relation Age of Onset     Diabetes Brother      Diabetes Sister      Diabetes Sister      Macular Degeneration No family hx of      Glaucoma No family hx of      Myocardial Infarction No family hx of      Kidney Disease No family hx of        SOCIAL HISTORY:  Social History     Socioeconomic History     Marital status:      Spouse name: Not on file     Number of children: 4     Years of education: Not on file     Highest education level: Not on file   Occupational History     Occupation: machine       Employer: [employer]     Employer: RETIRED   Social Needs     Financial resource strain: Not on file     Food insecurity     Worry: Not on file     Inability: Not on file     Transportation needs     Medical: Not on file     Non-medical: Not on file   Tobacco Use     Smoking status: Never Smoker     Smokeless tobacco: Never Used     Tobacco comment: Never smoked; non-smoking household   Substance and Sexual Activity     Alcohol use: No     Alcohol/week: 0.0 standard drinks     Drug use: No     Sexual activity: Yes     Partners: Female   Lifestyle     Physical activity     Days per week: Not on file     Minutes per session: Not on file     Stress: Not on file   Relationships     Social connections     Talks on phone: Not on file     Gets together: Not on file     Attends Anabaptist service: Not on file     Active member of club or organization: Not on file     Attends meetings of clubs or organizations: Not on file     Relationship status: Not on file     Intimate partner violence     Fear of current or ex partner: Not on file     Emotionally abused: Not on file     Physically abused: Not on file     Forced sexual activity: Not on file   Other Topics Concern     Parent/sibling w/ CABG, MI or angioplasty before 65F 55M? No   Social History Narrative     Not on file       LABS:  BMP  Recent Labs   Lab 07/21/20 0422 07/20/20 2302 07/20/20  2031 07/20/20  1610  07/20/20  1011    142  --  142  --  142   POTASSIUM 5.6* 5.5* 5.7* 5.2   < > 3.9   CHLORIDE 109 111*  --  110*  --  107   CO2 28 27  --  29  --  25   BUN 30 29  --  30  --  30   CR 1.58* 1.45*  --  1.45*  --  1.43*   * 178*  --  193*  --  322*   BRYANNA 8.4* 8.4*  --  8.6  --  8.3*   PHOS 4.3 2.2*  --  2.8  --  2.7    < > = values in this interval not displayed.     CBC  Recent Labs   Lab 07/21/20 0422 07/20/20 2302 07/20/20 1610 07/20/20  1011  07/19/20  1613   WBC 24.6* 23.2* 17.7* 12.0*   < > 7.7   HGB 9.4* 9.4* 9.9* 7.5*   < > 10.8*    HCT 28.3* 28.3* 29.2* 22.5*   < > 34.0*   MCV 91 91 91 91   < > 96   * 140* 145* 153   < > 99*   LYMPH  --   --   --   --   --  7.9    < > = values in this interval not displayed.     INR  Recent Labs   Lab 07/20/20  1610 07/20/20  0506 07/20/20  0232 07/20/20  0018 07/19/20 2032   INR 1.35* 1.35* 1.52* 1.59* 1.23*   PTT  --  37 36 30 31     IMAGING:    RHC 6/18/20     RA 20/26/18  RV 85/28  PA 82/45/58  PCWP 45  Cardiac output by Jacy: 3.85 L/min (indexed to 2.09 L/min/m2)  Cardiac output by thermodilution: 3.63 L/min (indexed to 1.97 L/min/m2)  SVR (by TD CO): 1123  PVR (by TD CO): 7.4    Angiogram 6/18/20   3 vessel CAD s/p 3V CABG in 2008 (LIMA-LAD, SVG-OM1, SVG-RPDA)  2. Known proximal SVG-RPDA occlusion  3. Presumed occlusion of SVG-OM1 (unable to locate on non-selective aortic root shot)  4. Patent LIMA-LAD with collateralization of LAD to PDA    Echocardiogram ( 3/11/2019)   Moderate to severe left ventricular dilation is present.  Severely (EF 10-20%) reduced left ventricular function is present.  No significant valve dysfunction.  Compared to study done 6/5/17 there is no significant change in LV size and  systolic function    Echocardiogram ( 7/5/2020)   Interpretation Summary  Severely (EF 10-20%) reduced left ventricular function is present. LVEF 15%  based on biplane 2D tracing. Severe left ventricular dilation is present.  LVIDd:6.8 cm. Grade III or advanced diastolic dysfunction.  The right ventricle is normal size.  Global right ventricular function is moderately reduced.  No significant valvular abnormalities were noted.  IVC diameter and respiratory changes fall into an intermediate range  suggesting an RA pressure of 8 mmHg.  Mild pulmonary hypertension is present.     This study was compared with the study from 3/25/2019. RV function has  worsened, LV size and function appear similar.  Devices  ICD ([employer]- 4/2/2015 )     I have reviewed today's vital signs, notes,  medications, labs and imaging.  I have also seen and examined the patient and agree with the findings and plan as outlined above.  Pt sedated and intubated POD #2 S/P OHT on simulect protocol with biventricular dysfn but stable hemodynamics with CVP 11, PCW 16 and CO7 with CI 3.8 and , lungs clear and tachy S1 and S2 with single component rub.  Labs with cr 1.6 and WBC 24.  Assessment: pt with OHT with graft dysfn most likely represents stunning and should have return of fn with support.  Plan to have formal TTE tomorrow which will guide extubation and IABP support.  Will continue steroids and cellcept and will give second dose of simulect on POD #4.  Donor cx results1/2 positive will consult ID.  Pt seen X3 for total critical care time 45 min.     James Terrazas MD, PhD  Professor, Heart Failure and Cardiac Transplantation  [facility]

## 2020-07-21 NOTE — PLAN OF CARE
OT-4E: Cancel this AM per RN as pt not yet medically appropriate. Will reschedule and check back when more appropriate for therapy initiation.

## 2020-07-21 NOTE — OP NOTE
Hosp.N [id number]   Sex M [facility]   Surname DOMINIC GRAHAM Forename Lucian Cardiologist TT    1955 Age 64 years Surgeon MG       Diagnosis Lucian Graham Sr. is a 64 year old male with a PMH of HTN, DMII, obesity, CKD, CHANTEL, ischemic cardiomyopathy and severe LV systolic dysfunction s/p 3V CABG () and primary prevention ICD ( 4/2/15). Currently he presents with 3 days of worsening fatigue and SOB with minimal exertion. Patient claims he could walk 1-2 blocks last week but has been barely able to ambulate home recently.    Admitted for possible LVAD/Transplant work up. IABP and listed status 2 for heart transplant .  Subclavian intra-aortic balloon pump inserted last week.    Surgeons M. Griselli, MD (First surgeon)  LIZETTE Alonzo MD (Co-surgeon)  YORDAN Corral MD (Assistant) Anaesthetist LEROY Sellers   Operation Redo sternotomy  Institution of cardio-pulmonary bypass at 34 C  Orthotopic heart transplant   Removal of ICD   ADARSH by anesthesia  IABP left in situ Date 2020     Co-Surgeon - Dr. LIZETTE Alonzo. A second attending surgeon requested for the operation because of complexity of the operation, and the patient previously had another sternotomy    ASSISTANT:  Dr. YORDAN Corral assistance was required because no qualified resident was available and their assistance was necessary for performance and exposure of a complex procedure.    Operation Notes    Introduction: Lucian comes forward with the above history. We have a suitable donor offer so we proceed to transplantation. He was found to be HIT positive so he received a run of plasmapheresis pre-op and intra op. I have discussed the pros and cons of heart transplantation with the patient in details and I have obtained the consent personally. Brief with OR performed at 8pm.    Anesthesia: General endotracheal    Procedure: After induction of general endotracheal anesthesia and placement of the necessary monitoring lines including NIRS,  the patient was positioned supine, prepped and draped in the standard sterile fashion. After confirmatory surgical pause and administration of prophylactic antibiotics, the redo median sternotomy was re-opened uneventfully. Complete dissection of heart and great arteries and veins and routine heparinization established. CPB instituted with aortic cannula and SVC and IVC venous cannulation. Subclavian IABP put on stand-by.  Cooling to 34 C commenced.   Ascending aorta was cross-clamped and the recipient heart excised.  LA cuff prepared for anastomosis. The donor heart was of good quality with no PFO. LA, aorta, PA, SVC and IVC were prepared. The LA anastomosis were performed with 3/0 Prolene and PA anastomosis with 5/0 Prolene, aortic and IVC anastomosis were performed with 4/0 Prolene. The SVC anastomosis was done with continuous 5/0 Prolene. The patient was rewarmed and heart deaired. Finally the clamp was removed after de-airing. Methylprednisolone was given at reperfusion. The heart resumed good contractility in slow sinus rhythm. Four pacing wires (2RV + 2RA) were inserted and AAI pacing instituted at 115/min. After reperfusion for about 60 minutes and completion of the anastomoses, CPB was discontinued on adrenaline, vasopressin, isoprenaline and norepinephrine, all in small doses. IABP restarted at 1:1. ADARSH showed good LV and RV function (MS) and no anastomotic problems. Protamine was given and hemostasis achieved easily. LA line inserted. Right pleural 24F curved drain and one 24F mediastinal and a 24F Katty type pericardial drains were inserted. The ICD/pacemaker was removed and pocket closed in layers. The chest was closed in layers as well. Sergiowas transferred to CVICU in a haemodynamically stable condition. Instruments, needles and sponges counts were reported correct at the end of procedure and de-brief performed before leaving OR.    EBL: 1L    CPB Technical Data:    Weight :    75.4 kg  CPB  time:    109 min at 34 C  Donor heart X clamp time  237 min (45 min warm ischemia)  Aortic cannula:    20F  IVC cannula:    28F  SVC cannula:    28F        Massimo Griselli

## 2020-07-21 NOTE — PROGRESS NOTES
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 is a 64 year old male with PMH T2DM, HTN, CKD, CHANTEL, ICM with severe LV dysfunction, s/p 3V CABG (2008), ICD (2015), S/p Heart Transplant on 7/20 with Dr. Griselli.     TODAY'S PROGRESS:   -Wean Lalito to 5 by the end of the day, then off by tomorrow afternoon with plan to PST and extubate once Lalito is off  -Wean PEEP to 6   -IABP possibly removed today or tomorrow  -Increase TF to goal   -Consult transplant ID    PLAN:  Neuro/ pain/ sedation:  #Acute post operative pain  - Monitor neurological status. Notify the MD for any acute changes in exam.  - Fentanyl gtt for pain, tylenol, gabapentin, oxy/dilaudid/robaxin PRN  - Propofol gtt for sedation     Pulmonary care:   #CHANTEL  - CMV/AC 14/550/8/40  - Wean Lalito to 5 by the end of the day, then off by tomorrow afternoon with plan to PST and extubate once Lalito is off  - Titrate FiO2 for SpO2 >92%     Cardiovascular:    #ICM with sever LV dysfunction s/p heart transplant 7/20  #S/p 3V CABG (2008)  #VT/VF arrhythmia s/p ICD (2015)  #Atrial flutter with RVR  #HTN/HLD  #IABP (subclavian)  - Monitor hemodynamic status.   - MAP >65  - Pressers Epi 0.05, NE 0.05  - Isoproterenol 0.01  - PTA aspirin 81 mg, atorvastatin 40 mg, plavix 75 mg, lasix 20 mg BID  - PTA hydralazine 25 mg TID, losartan 50 mg     GI/Nutrition:   #Lactic Acidosis  - NPO except ice chips and medications  -  Increase TFs to goal   - lactic acid down trending      Fluids/ Electrolytes/Renal:   #CKD, baseline Cr 1.6  #Hypokalemia, improving  - TKO for IV fluid hydration  - Urine output is adequate so far  - Will continue to monitor intake and output.  - Cr 1.58  - Replete electrolytes PRN      Endocrine:    #T2DM  # Stress hyperglycemia  - Insulin gtt  - Start lantus 40 units daily   - PTA exenatide 2mg Q7days, glimepiride 4 mg, Lantus 8 units in the morning and 40 units in the evening.     ID/ Antibiotics:  - Complete perioperative antibiotics: Vancomycin x 3 days  - Donor BCX growing: Strep salivarius, likely contaminate   - Consult transplant ID, appreciate recs  - Start ceftriaxone x 4 days to complete 7 days post op and discontinue cefepime  - Nystatin     Immunosuppression:  -Salumedrol today, then prednisone taper   -Cellcept, prednisone     Heme:     #Thrombocytopenin  #HIT positive   - Hgb goal >7  - Holding off on anticoagulation   - Awaiting CORRINE for final determination of anticoagulation plan      Prophylaxis:    - Mechanical prophylaxis for DVT.   - No chemical DVT prophylaxis due to high risk of bleeding.   - Protonix qd     Lines/ tubes/ drains:  - MAC (7/19), [place] (7/19), Arterial line (7/19), PICC (7/11), PIV (7/20), [place] (7/19), CT     Disposition:  - CV ICU     Patient seen, findings and plan discussed with CV ICU staff, Dr. Crump.     Yanely Vega MD (CA2)  Anesthesiology Resident  *[id number]    ====================================    SUBJECTIVE:   Hyperkalemia, received lasix 40 overnight. Remains on pressers and Lalito.     OBJECTIVE:   1. VITAL SIGNS:   Temp:  [98.1  F (36.7  C)-99.9  F (37.7  C)] 98.6  F (37  C)  Heart Rate:  [] 115  Resp:  [11-14] 11  MAP:  [59 mmHg-107 mmHg] 82 mmHg  Arterial Line BP: ()/(41-81) 103/60  FiO2 (%):  [40 %] 40 %  SpO2:  [100 %] 100 %  Ventilation Mode: CMV/AC  (Continuous Mandatory Ventilation/ Assist Control)  FiO2 (%): 40 %  Rate Set (breaths/minute): 14  breaths/min  Tidal Volume Set (mL): 550 mL  PEEP (cm H2O): 8 cmH2O  Oxygen Concentration (%): 40 %  Resp: 11      2. INTAKE/ OUTPUT:   I/O last 3 completed shifts:  In: 9317.07 [I.V.:3449.07; Other:338; NG/GT:380]  Out: 3821 [Urine:2180; Other:1; Chest Tube:1640]    3. PHYSICAL EXAMINATION:   General: Intubated   Neuro: Sedated, follows commands  Resp: Breathing non-labored on mechanical ventilation  CV:   Abdomen: Soft, Non-distended, Non-tender  Extremities: warm and well perfused    4. INVESTIGATIONS:   Arterial Blood Gases   Recent Labs   Lab 07/21/20  0420 07/20/20  1610 07/20/20  1308 07/20/20  1007   PH 7.45 7.41 7.40 7.39   PCO2 39 44 38 39   PO2 134* 141* 149* 163*   HCO3 27 28 23 23     Complete Blood Count   Recent Labs   Lab 07/21/20  0422 07/20/20  2302 07/20/20  1610 07/20/20  1011   WBC 24.6* 23.2* 17.7* 12.0*   HGB 9.4* 9.4* 9.9* 7.5*   * 140* 145* 153     Basic Metabolic Panel  Recent Labs   Lab 07/21/20  0422 07/20/20  2302 07/20/20  2031 07/20/20  1610  07/20/20  1011    142  --  142  --  142   POTASSIUM 5.6* 5.5* 5.7* 5.2   < > 3.9   CHLORIDE 109 111*  --  110*  --  107   CO2 28 27  --  29  --  25   BUN 30 29  --  30  --  30   CR 1.58* 1.45*  --  1.45*  --  1.43*   * 178*  --  193*  --  322*    < > = values in this interval not displayed.     Liver Function Tests  Recent Labs   Lab 07/20/20  1610 07/20/20  0506 07/20/20  0232 07/20/20  0018  07/19/20  1613   AST  --   --  108*  --   --  24   ALT  --   --  39  --   --  21   ALKPHOS  --   --  69  --   --  113   BILITOTAL  --   --  1.8*  --   --  0.6   ALBUMIN  --   --  2.8*  --   --  2.6*   INR 1.35* 1.35* 1.52* 1.59*   < > 1.18*    < > = values in this interval not displayed.     Pancreatic Enzymes  Recent Labs   Lab 07/19/20  1613   AMYLASE 36     Coagulation Profile  Recent Labs   Lab 07/20/20  1610 07/20/20  0506 07/20/20  0232 07/20/20  0018 07/19/20  2032   INR 1.35* 1.35* 1.52* 1.59* 1.23*   PTT  --  37 36 30 31          5. RADIOLOGY:   Recent Results (from the past 24 hour(s))   Echo Limited    Narrative    [id number]  [id number]  [id number]  [id number]^OTF^KAYLI^WINSOME           [facility],[place]  Echocardiography Laboratory  [address] [place] [id number]     Name: BUCK CABAN  MRN: [id number]  : 1955  Study Date: 2020 08:28 AM  Age: 64 yrs  Gender: Male  Patient [facility]  Reason For Study: Transplant - Heart (POD 1)  Ordering Physician: KAYLI VANESSA  Referring Physician: SHONDA MERCHANT  Performed By: Yfn Unger RDCS     BSA: 1.8 m2  Height: 64 in  Weight: 166 lb  HR: 115  _____________________________________________________________________________  __        Procedure  Limited Portable Echo Adult. Technically difficult study.Extremely poor  acoustic windows. Limited information was obtained during study.  _____________________________________________________________________________  __        Interpretation Summary  Technically difficult study.Extremely poor acoustic windows.  Limited information was obtained during study.  Left ventricular size is normal.  The Ejection Fraction is estimated at 20-25%.  Global right ventricular function is severely reduced.  Dilation of the inferior vena cava is present with abnormal respiratory  variation in diameter.  No pericardial effusion is present.  _____________________________________________________________________________  __        Left Ventricle  Left ventricular size is normal. Borderline concentric wall thickening  consistent with left ventricular hypertrophy is present. The Ejection Fraction  is estimated at 20-25%. Severe diffuse hypokinesis is present.     Right Ventricle  Global right ventricular function is severely reduced.     Vessels  Dilation of the inferior vena cava is present with abnormal respiratory  variation in diameter.     Pericardium  No pericardial effusion is present.      _____________________________________________________________________________  __  MMode/2D Measurements & Calculations  IVSd: 1.2 cm  LVIDd: 4.8 cm  LVIDs: 4.3 cm  LVPWd: 1.1 cm     FS: 9.5 %  LV mass(C)d: 201.9 grams  LV mass(C)dI: 111.7 grams/m2  RWT: 0.47           _____________________________________________________________________________  __           Report approved by: Ricardo Herzog 07/20/2020 09:21 AM      XR Abdomen Port 1 View    Narrative    Exam: XR ABDOMEN PORT 1 VW, 7/20/2020 3:06 PM    Indication: [place], patient has cardiac transplant.    Comparison: CT: 7/19/2019    Findings:   Single frontal radiograph of the abdomen. Partially visualized median  sternotomy wires. There are 2 enteric tubes. Gastric tube projects  over the proximal stomach, with sidehole past the GE junction. Second  larger bore feeding tube tip projects over the distal stomach in the  region of the pylorus. Right-sided inferiorly directed chest tube is  in appropriate position. There are 2 additional drains that are  partially visualized. Nonobstructive bowel gas pattern. No acute  osseous abnormalities. Postsurgical changes in the thorax. Please see  separate same-day chest radiograph.       Impression    Impression:   Gastric tube tip and sidehole of the proximal stomach. Feeding tube  tip present towards the distal stomach in the region of the pylorus.    I have personally reviewed the examination and initial interpretation  and I agree with the findings.    TASIA PERKINS MD   XR Chest Port 1 View    Narrative    XR CHEST PORT 1 VW  7/20/2020 3:07 PM      HISTORY: PA cath adjustment    COMPARISON: Chest x-ray same day.    FINDINGS:   Postsurgical changes of the chest. Sternotomy wires are intact.  Interval advancement of intra-aortic balloon pump with tip now  projecting at the level of the aortic arch. Right [place] catheter  tip terminates over the main pulmonary artery, unchanged from prior  exam. Pericardial  and/or mediastinal drains are unchanged. Right chest  tube in stable position. Endotracheal tube in stable position with tip  projecting over the mid trachea. Enteric tube tip courses below the  margin of the film. Right upper extremity central catheter tip  projects over the mid SVC.    Cardiomediastinal silhouette is within normal limits. Stable perihilar  and mediastinal opacities. No pneumothorax. Stable, mild  pneumomediastinum. Retrocardiac opacities.                                                                        Impression    IMPRESSION:   1. Postsurgical heart transplant changes with interval advancement of  intra-aortic balloon pump with tip now projecting at the level of the  aortic arch. Additional lines and tubes stable as above.    2. Stable retrocardiac opacities, favored to represent atelectasis.    3. Unchanged small right pleural effusion.    I have personally reviewed the examination and initial interpretation  and I agree with the findings.    KANDACE MENDOZA MD       =========================================

## 2020-07-21 NOTE — PROCEDURES
Small Bowel Feeding Tube Reposition  Reason for Feeding Tube Reposition: FT tip at pylorus per AXR reading   Cortrak Start Time: 1145  Cortrak End Time: 1200  Medicine Delivered During Procedure: Lubricating jelly  Reposition Successful: Unchanged. FT tip remains at pylorus.   Procedure Complications: Repeatedly coiling within stomach with attempts to advance to small bowel.  Final Placement David at exit of nare: 88 cm (resecured w/ existing bridle)   Face to Face time with patient: 15 min     Nadiya Stinson RD, LD  [id number]  Pgr: [phone]

## 2020-07-21 NOTE — PLAN OF CARE
Major Shift Events:  Pt with hyperkalemi at 5.7. CVTS and cardiology aware. 20mg of Lasix given x2. Urine output is good at this time. Vitally stable, please refer to hemodynamics in flowsheets    Plan: Possible removal of balloon pump. Wean Lalito as tolerated. CTM    For vital signs and complete assessments, please see documentation flowsheets.

## 2020-07-21 NOTE — CONSULTS
[facility]  Transplant Infectious Disease Consult Note - New Patient     Patient:  Lucian Henderson Sr., Date of birth 1955, Medical record number [id number]  Date of Visit:  07/21/2020  Consult requested by Dr. Massimo Griselli for evaluation of blood cultures growing Streptococcus salivarius         Assessment and Recommendations:   Recommendations:    1. Discontinue Vancomycin  2. Discontinue cefepime  3. Start Ceftriaxone (2g Q24 Hrs) until 7/25    Thank you very much for this consultation. Transplant Infectious Disease will continue to follow with you.    Assessment:    Infectious Disease issues include:    - Donor Positive Culture Streptococcus salivarius  Graft donor blood culture reported positive with Streptococcus salivarius. While it is unclear if this is a contaminant versus an infection, TTE post operatively did not show any vegetations on the valves, patient has been hemodynamically stable given the caveat of having a heart transplant. CXR obtained has not shown evidence of a consolidation concerning for lung infection. A pronounced leukocytosis after surgical transplant and steroids while being afebrile is unlikely to be a sign of infection. Recommend the conservative approach and switch antibiotics from vancomycin/cefepime to ceftriaxone for a total of 7 days of antimicrobial therapy (ending on 7/26).    Fritz Mcdonnell MD  PGY-1, Internal Medicine  Pager: [phone]         History of Infectious Disease Illness:     Lucian Henderson Sr. Is a 64 year old male with T2DM, HTN, CKD, CHANTEL, ischemic cardiomyopathy w/severe LV dysfunction, s/p 3 V CABG (2008), ICD (2015), and s/p heart transplant on 7/20/2020. Blood cultures prior to transplantation in recipient were negative. Donor cultures tested positive for streptococcus salivarius.     Patient was seen today with wife at his bedside. He was able to answer yes/no questions as he is still intubated with  minimal sedation. Overall, he is feeling good today compared to the previous days.  Does not endorse fevers, chills, sweats, headaches, changes in vision, shortness of breath, chest pain, or abdominal pain.      Transplants:  7/19/2020 (Heart), Postoperative day:  2.  Coordinator Christelle Pettit    Review of Systems:  CONSTITUTIONAL:  No fevers or chills  EYES: negative for icterus or acute vision changes  ENT: No changes in hearing  RESPIRATORY:  negative for cough, sputum or dyspnea  CARDIOVASCULAR:  negative for chest pain, palpitations  GASTROINTESTINAL:  negative for nausea, vomiting  GENITOURINARY:  negative for dysuria or hematuria  HEME:  No easy bruising or bleeding  INTEGUMENT:  negative for rash or pruritus  NEURO:  Negative for headache or tremor    Past Medical History:   Diagnosis Date     CAD (coronary artery disease)      CHF (congestive heart failure) (H)      CKD (chronic kidney disease), stage III (H)      Cortical cataract of both eyes      Diabetes (H)      Hyperlipidemia      Hypertension      Ischemic cardiomyopathy      Obesity      CHANTEL (obstructive sleep apnea)     occas cpap     Osteoarthritis        Past Surgical History:   Procedure Laterality Date     C CABG, ARTERY-VEIN, THREE  02/2008     CATARACT IOL, RT/LT       COLONOSCOPY N/A 8/7/2019    Procedure: COLONOSCOPY, WITH POLYPECTOMY AND BIOPSY;  Surgeon: Chauncey Morataya MD;  Location:  GI     CV RIGHT HEART CATH N/A 3/25/2019    Procedure: CV RIGHT HEART CATH;  Surgeon: Moises Santos MD;  Location:  HEART CARDIAC CATH LAB     CV RIGHT HEART CATH N/A 7/10/2019    Procedure: CV RIGHT HEART CATH;  Surgeon: Jak Mccabe MD;  Location:  HEART CARDIAC CATH LAB     CV RIGHT HEART CATH N/A 7/8/2020    Procedure: Right Heart Cath with Leave In [place] already has ICU load;  Surgeon: Jak Mccabe MD;  Location:  HEART CARDIAC CATH LAB     EXTRACTION(S) DENTAL Left 7/13/2020    Procedure: EXTRACTION, TOOTH #11, 12,  13, 15, and 29;  Surgeon: Monica Chao DDS;  Location: [facility] OR     IMPLANT AUTOMATIC IMPLANTABLE CARDIOVERTER DEFIBRILLATOR       INSERT INTRAAORTIC BALLOON PUMP N/A 7/16/2020    Procedure: Subclavian Intra-Aortic Balloon Pump Placement;  Surgeon: Allan Sparrow MD;  Location: [facility] OR     PHACOEMULSIFICATION CLEAR CORNEA WITH STANDARD IOL, VITRECTOMY PARSPLANA 25 GAGUE, COMBINED Left 12/10/2019    Procedure: PHACOEMULSIFICATION, CATARACT, CLEAR CORNEAL INCISION APPROACH, W STD INTRAOCULAR LENS IMPLANT INSERT + VITRECTOMY BY PARS PLANA  USING 25-GAUGE INSTRUMENTS. ENDOLASER, INFUSION OF 20% SF6 GAS;  Surgeon: Triny Bunch MD;  Location: [facility] OR     PICC INSERTION Right 07/11/2020    basilic 44 cm total        Family History   Problem Relation Age of Onset     Diabetes Brother      Diabetes Sister      Diabetes Sister      Macular Degeneration No family hx of      Glaucoma No family hx of      Myocardial Infarction No family hx of      Kidney Disease No family hx of        Social History     Social History Narrative     Not on file     Social History     Tobacco Use     Smoking status: Never Smoker     Smokeless tobacco: Never Used     Tobacco comment: Never smoked; non-smoking household   Substance Use Topics     Alcohol use: No     Alcohol/week: 0.0 standard drinks     Drug use: No       Immunization History   Administered Date(s) Administered     Influenza (IIV3) PF 10/05/2011, 10/15/2012     Influenza Vaccine IM > 6 months Valent IIV4 10/27/2015, 09/14/2016, 10/05/2017, 10/03/2018     Pneumococcal 23 valent 08/04/2009, 04/03/2015     TDAP Vaccine (Adacel) 08/04/2009       Patient Active Problem List   Diagnosis     Coronary artery disease involving native coronary artery without angina pectoris     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)     Hyperlipidemia LDL goal <100     Hypertension goal BP (blood pressure) < 140/90     CHF (NYHA class II, ACC/AHA stage C) (H)     CKD (chronic kidney  disease) stage 3, GFR 30-59 ml/min (H)     Health Care Home     Automatic implantable cardioverter-defibrillator - [employer] single lead ICD, Not Dependent     Chronic systolic heart failure (H)     Proteinuria     Vitamin D deficiency     OA (osteoarthritis) of knee     Chondromalacia of patella, unspecified laterality     Pain in joint of left shoulder     Tinnitus     Ptosis of right eyelid     Secondary renal hyperparathyroidism (H)     Cortical cataract of both eyes     Moderate nonproliferative diabetic retinopathy, without macular edema, associated with type 2 diabetes mellitus     Obesity     CHANTEL (obstructive sleep apnea)- severe (AHI 30)     Systolic heart failure (H)     Heart transplant candidate     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, unspecified whether long term insulin use (H)     Nuclear cataract, nonsenile     Coronary artery disease involving native coronary artery of native heart without angina pectoris     Status post coronary angiogram     CHF (congestive heart failure) (H)     Acute on chronic systolic congestive heart failure (H)     Heart failure (H)     Dental caries     Heart transplant, orthotopic, status (H)            Current Medications & Allergies:       acetaminophen  975 mg Oral or Feeding Tube Q8H     ceFEPIme (MAXIPIME) IV  2 g Intravenous Q12H     gabapentin  100 mg Oral or Feeding Tube Daily     insulin glargine  40 Units Subcutaneous Daily     multivitamins w/minerals  15 mL Per Feeding Tube Daily     mycophenolate mofetil  1,500 mg Intravenous Q12H     nystatin  1,000,000 Units Oral 4x Daily     pantoprazole (PROTONIX) IV  40 mg Intravenous Daily     polyethylene glycol  17 g Oral or Feeding Tube Daily     predniSONE  40 mg Oral BID    Followed by     [START ON 7/23/2020] predniSONE  35 mg Oral BID    Followed by     [START ON 7/25/2020] predniSONE  30 mg Oral BID    Followed by     [START ON 7/27/2020] predniSONE  25  mg Oral BID    Followed by     [START ON 7/29/2020] predniSONE  20 mg Oral BID     senna-docusate  1 tablet Oral BID    Or     senna-docusate  2 tablet Oral BID     sodium chloride (PF)  3 mL Intracatheter Q8H     vancomycin (VANCOCIN) IV  1,500 mg Intravenous Q18H       Infusions/Drips:      dextrose       dextrose       EPINEPHrine IV infusion ADULT 0.05 mcg/kg/min (07/21/20 0700)     fentaNYL 75 mcg/hr (07/21/20 0700)     insulin (regular) 12 Units/hr (07/21/20 0700)     isoproterenol (ISUPREL) infusion ADULT 0.01 mcg/kg/min (07/21/20 0700)     - MEDICATION INSTRUCTIONS -       norepinephrine 0.05 mcg/kg/min (07/21/20 0700)     propofol (DIPRIVAN) infusion 15 mcg/kg/min (07/21/20 0728)     BETA BLOCKER NOT PRESCRIBED       vasopressin (PITRESSIN) infusion ADULT (40 mL) Stopped (07/20/20 1830)       Allergies   Allergen Reactions     Heparin Heparin Induced Thrombocytopenia     HIT screen sent 7/18/2020. No heparin products until resulted     No Known Drug Allergies             Physical Exam:     Patient Vitals for the past 24 hrs:   Temp Temp src Resp SpO2 Weight   07/21/20 0800 99.1  F (37.3  C) Pulm Art 15 100 % --   07/21/20 0745 -- -- -- 100 % --   07/21/20 0730 -- -- -- 100 % --   07/21/20 0715 -- -- -- 100 % --   07/21/20 0700 -- -- -- 100 % --   07/21/20 0645 99.3  F (37.4  C) Pulm Art -- 100 % --   07/21/20 0630 -- -- -- 100 % --   07/21/20 0615 -- -- -- 100 % --   07/21/20 0600 -- -- 11 100 % --   07/21/20 0545 -- -- -- 100 % --   07/21/20 0530 -- -- -- 100 % --   07/21/20 0515 -- -- -- 100 % --   07/21/20 0500 -- -- 11 100 % --   07/21/20 0445 -- -- -- 100 % --   07/21/20 0435 -- -- -- 100 % --   07/21/20 0430 -- -- -- 100 % --   07/21/20 0415 -- -- -- 100 % --   07/21/20 0400 98.6  F (37  C) Pulm Art 11 100 % 82.2 kg (181 lb 3.5 oz)   07/21/20 0345 -- -- -- 100 % --   07/21/20 0330 -- -- -- 100 % --   07/21/20 0315 -- -- -- 100 % --   07/21/20 0300 -- -- 11 100 % --   07/21/20 0245 -- -- -- 100 % --    07/21/20 0230 -- -- -- 100 % --   07/21/20 0215 -- -- -- 100 % --   07/21/20 0200 -- -- 11 100 % --   07/21/20 0145 -- -- -- 100 % --   07/21/20 0130 -- -- -- 100 % --   07/21/20 0115 -- -- -- 100 % --   07/21/20 0100 -- -- 11 100 % --   07/21/20 0045 -- -- -- 100 % --   07/21/20 0030 -- -- -- 100 % --   07/21/20 0015 -- -- -- 100 % --   07/21/20 0000 98.8  F (37.1  C) Pulm Art 11 100 % --   07/20/20 2345 -- -- -- 100 % --   07/20/20 2330 -- -- -- 100 % --   07/20/20 2315 -- -- -- 100 % --   07/20/20 2300 -- -- 11 100 % --   07/20/20 2245 -- -- -- 100 % --   07/20/20 2230 -- -- -- 100 % --   07/20/20 2215 -- -- -- 100 % --   07/20/20 2200 -- -- 11 100 % --   07/20/20 2145 -- -- -- 100 % --   07/20/20 2130 -- -- -- 100 % --   07/20/20 2115 -- -- -- 100 % --   07/20/20 2100 -- -- 11 100 % --   07/20/20 2045 -- -- -- 100 % --   07/20/20 2030 -- -- -- 100 % --   07/20/20 2015 -- -- -- 100 % --   07/20/20 2000 98.4  F (36.9  C) Pulm Art 11 100 % --   07/20/20 1945 -- -- -- 100 % --   07/20/20 1930 -- -- -- 100 % --   07/20/20 1915 -- -- -- 100 % --   07/20/20 1900 98.1  F (36.7  C) -- 11 100 % --   07/20/20 1845 -- -- -- 100 % --   07/20/20 1830 -- -- -- 100 % --   07/20/20 1815 -- -- -- 100 % --   07/20/20 1800 98.4  F (36.9  C) -- 11 100 % --   07/20/20 1745 -- -- -- 100 % --   07/20/20 1730 -- -- -- 100 % --   07/20/20 1715 -- -- -- 100 % --   07/20/20 1700 99  F (37.2  C) -- 11 100 % --   07/20/20 1645 -- -- -- 100 % --   07/20/20 1630 -- -- -- 100 % --   07/20/20 1615 -- -- -- 100 % --   07/20/20 1600 98.8  F (37.1  C) Pulm Art 11 100 % --   07/20/20 1545 -- -- -- 100 % --   07/20/20 1530 -- -- -- 100 % --   07/20/20 1515 -- -- -- 100 % --   07/20/20 1500 98.8  F (37.1  C) -- 11 100 % --   07/20/20 1445 -- -- -- 100 % --   07/20/20 1430 98.6  F (37  C) -- -- 100 % --   07/20/20 1415 -- -- -- 100 % --   07/20/20 1400 98.6  F (37  C) -- 11 100 % --   07/20/20 1345 98.6  F (37  C) -- -- 100 % --   07/20/20 1330 98.6   F (37  C) -- -- 100 % --   07/20/20 1315 98.6  F (37  C) -- -- 100 % --   07/20/20 1300 98.6  F (37  C) -- 11 100 % --   07/20/20 1245 -- -- -- 100 % --   07/20/20 1230 98.6  F (37  C) -- -- 100 % --   07/20/20 1215 -- -- -- 100 % --   07/20/20 1200 98.6  F (37  C) Pulm Art 11 100 % --   07/20/20 1145 -- -- -- 100 % --   07/20/20 1130 -- -- -- 100 % --   07/20/20 1115 -- -- -- 100 % --   07/20/20 1100 99  F (37.2  C) -- 11 100 % --   07/20/20 1045 -- -- -- 100 % --   07/20/20 1030 -- -- -- 100 % --   07/20/20 1015 -- -- -- 100 % --   07/20/20 1000 99.1  F (37.3  C) -- 11 100 % --   07/20/20 0945 -- -- -- 100 % --   07/20/20 0930 -- -- -- 100 % --   07/20/20 0915 -- -- -- 100 % --     Ranges for vital signs:  Temp:  [98.1  F (36.7  C)-99.3  F (37.4  C)] 99.1  F (37.3  C)  Heart Rate:  [] 115  Resp:  [11-15] 15  MAP:  [63 mmHg-107 mmHg] 74 mmHg  Arterial Line BP: ()/(41-81) 88/60  FiO2 (%):  [40 %] 40 %  SpO2:  [100 %] 100 %  Vitals:    07/19/20 0800 07/21/20 0400   Weight: 75.4 kg (166 lb 3.2 oz) 82.2 kg (181 lb 3.5 oz)       Physical Examination:  GENERAL:  well-developed, well-nourished, in bed in no acute distress, intubated, off of sedation.  HEAD:  Head is normocephalic, atraumatic   EYES:  Eyes have anicteric sclerae without conjunctival injection   ENT:  Oropharynx is moist. Tongue is midline  NECK:  Supple. No cervical lymphadenopathy  LUNGS:  Clear to auscultation bilateral.   CARDIOVASCULAR:  Regular rate and rhythm with no murmurs, gallops or rubs.  ABDOMEN:  Normal bowel sounds, soft, nontender. No appreciable hepatosplenomegaly.  SKIN:  No acute rashes.  Line in place without any surrounding erythema or exudate. Surgical insicions healing well, bandages without visible drainage.  NEUROLOGIC:  Grossly nonfocal. Active x4 extremities         Laboratory Data:     No results found for: ACD4    Inflammatory Markers    Recent Labs   Lab Test 07/10/20  0332 07/08/19  1021   CRP  --  9.9   PSA 0.12  0.75       Immune Globulin Studies     Recent Labs   Lab Test 04/09/15  0721   IGG 1,310   IGM 38*          Metabolic Studies       Recent Labs   Lab Test 07/21/20  0855 07/21/20 0422 07/21/20 0420 07/20/20 2302 07/03/20  1559   NA  --  141  --  142   < > 133   POTASSIUM  --  5.6*  --  5.5*   < > 4.4   CHLORIDE  --  109  --  111*   < > 102   CO2  --  28  --  27   < > 25   ANIONGAP  --  5  --  3   < > 6   BUN  --  30  --  29   < > 33*   CR  --  1.58*  --  1.45*   < > 1.65*   GFRESTIMATED  --  45*  --  50*   < > 42*   GLC  --  182*  --  178*   < > 229*   A1C  --   --   --   --   --  8.2*   BRYANNA  --  8.4*  --  8.4*   < > 8.7   PHOS  --  4.3  --  2.2*   < >  --    MAG  --  2.4*  --   --    < > 2.2   LACT 1.4  --  1.0 1.5   < >  --     < > = values in this interval not displayed.       Hepatic Studies    Recent Labs   Lab Test 07/21/20 0422 07/20/20  0232 07/19/20 1613 07/08/19  1021   BILITOTAL 1.0 1.8* 0.6   < > 0.6   DBIL 0.4*  --   --    < >  --    ALKPHOS 67 69 113   < > 126   PROTTOTAL 5.7* 5.5* 7.0   < > 7.5   ALBUMIN 2.9* 2.8* 2.6*   < > 3.4   AST 72* 108* 24   < > 14   ALT 34 39 21   < > 20   LDH  --   --   --   --  174    < > = values in this interval not displayed.       Pancreatitis testing    Recent Labs   Lab Test 07/19/20  1613 07/08/19  1021   AMYLASE 36  --    TRIG  --  181*       Gout Labs      Recent Labs   Lab Test 07/08/19  1021 06/26/15  1606 04/06/15  1555   URIC 10.0* 5.7 6.5       Hematology Studies      Recent Labs   Lab Test 07/21/20 0422 07/20/20  2302 07/20/20  1610 07/20/20  1011 07/20/20  0506 07/20/20  0232  07/19/20  1613  07/08/19  1021   WBC 24.6* 23.2* 17.7* 12.0* 11.8* 12.5*  --  7.7   < > 7.7   ANEU  --   --   --   --   --   --   --  6.0  --  4.9   ALYM  --   --   --   --   --   --   --  0.6*  --  1.7   ELAN  --   --   --   --   --   --   --  0.8  --  0.8   AEOS  --   --   --   --   --   --   --  0.3  --  0.3   HGB 9.4* 9.4* 9.9* 7.5* 8.1* 7.4*   < > 10.8*   < > 13.4    HCT 28.3* 28.3* 29.2* 22.5* 25.5* 23.6*  --  34.0*   < > 42.5   * 140* 145* 153 191 163   < > 99*   < > 235    < > = values in this interval not displayed.       Clotting Studies    Recent Labs   Lab Test 07/20/20  1610 07/20/20  0506 07/20/20  0232 07/20/20  0018   INR 1.35* 1.35* 1.52* 1.59*   PTT  --  37 36 30       Iron Testing    Recent Labs   Lab Test 07/21/20  0422  07/08/19  1021  03/06/15  1125   IRON  --   --  49  --  51   FEB  --   --  247  --  277   IRONSAT  --   --  20  --  18   SEVERIANO  --   --  156  --  392*   MCV 91   < > 96   < > 93   B12  --   --  258  --   --     < > = values in this interval not displayed.       Markers  No lab results found.    Invalid input(s): FETOPROTEIN, SERUM, AFP    Autoimmune Testing  No lab results found.    Invalid input(s): MPO, PRO3, C3, C4, VASPAN    Arterial Blood Gas Testing    Recent Labs   Lab Test 07/21/20  0855 07/21/20  0421 07/21/20  0420 07/21/20  0005  07/20/20  1610 07/20/20  1308 07/20/20  1007 07/20/20  0828   PH  --   --  7.45  --   --  7.41 7.40 7.39 7.43   PCO2  --   --  39  --   --  44 38 39 30*   PO2  --   --  134*  --   --  141* 149* 163* 153*   HCO3  --   --  27  --   --  28 23 23 20*   O2PER 40 40 40 40   < > 40  40 40% 40 40  40    < > = values in this interval not displayed.        Thyroid Studies     Recent Labs   Lab Test 07/07/20  0720 07/04/20  0517 07/08/19  1021 01/05/17  0741 01/23/15  1723   TSH 1.30 1.25 1.30 1.44 1.16       Urine Studies     Recent Labs   Lab Test 07/19/20  1930 07/17/20  1402 07/08/19  1017 07/26/16  1006 06/26/15  1640   URINEPH 6.0 5.0 6.0 6.0 5.5   NITRITE Negative Negative Negative Negative Negative   LEUKEST Negative Large* Large* Negative Negative   WBCU <1 13* >182* O - 2 0   [facility]  --   --  Present*  --   --        Medication levels  No lab results found.    Invalid input(s): AMIK    CSF testing   No lab results found.    Invalid input(s): CADAM, EVPCR, ENTPCR, ENTEROVIRUS    Body fluid stats  No lab  results found.    Microbiology:  Fungal testing  No lab results found.    Invalid input(s): HIFUN, FUNGL    Last Culture results with specimen source  Culture Micro   Date Value Ref Range Status   07/17/2020 No growth after 3 days  Preliminary   07/17/2020 No growth  Final   07/17/2020 Culture negative monitoring continues  Preliminary   07/17/2020 No growth  Final   11/05/2009 No Beta Streptococcus isolated  Final    Specimen Description   Date Value Ref Range Status   07/17/2020 Blood Left Arm  Final   07/17/2020 Catheter tip  Final   07/17/2020 Catheter tip  Final   07/17/2020 Midstream Urine  Final   11/05/2009 Throat  Final   11/05/2009 Throat  Final        Last check of C difficile  No results found for: CDBPCT    Syphilis Testing    Treponema Antibodies   Date Value Ref Range Status   07/08/2019 Nonreactive NR^Nonreactive Final       Quantiferon testing   Recent Labs   Lab Test 07/19/20  1613 07/08/19  1021   LYMPH 7.9 22.1   TBRES  --  Negative       Virology:  Respiratory virus testing    Recent Labs   Lab Test 07/07/20  1456   [id number] Nasopharyngeal   SARSCOVRES NEGATIVE       CMV viral loads  No lab results found.    No results found for: CMVLOG    No results found for: H6RES    No results found for: EBRES    No results found for: 42152, BKRES    Parvovirus Testing  No lab results found.    Invalid input(s): PRVRES    Adenovirus Testing  No lab results found.    Invalid input(s): ADENAB, ADAG, ADENOVIRUS, ADQT    Hepatitis B Testing     Recent Labs   Lab Test 07/08/19  1021   AUSAB 1.02   HBCAB Nonreactive   HEPBANG Nonreactive        Hepatitis C Antibody   Date Value Ref Range Status   07/08/2019 Nonreactive NR^Nonreactive Final     Comment:     Assay performance characteristics have not been established for newborns,   infants, and children     07/06/2016  NR Final    Nonreactive   Assay performance characteristics have not been established for newborns,   infants, and children         CMV Antibody  IgG   Date Value Ref Range Status   07/19/2020 >8.0 (H) 0.0 - 0.8 AI Final     Comment:     Positive  Antibody index (AI) values reflect qualitative changes in antibody   concentration that cannot be directly associated with clinical condition or   disease state.     07/08/2019 >8.0 (H) 0.0 - 0.8 AI Final     Comment:     Positive  Antibody index (AI) values reflect qualitative changes in antibody   concentration that cannot be directly associated with clinical condition or   disease state.       Varicella Zoster Virus Antibody IgG   Date Value Ref Range Status   07/08/2019 7.6 (H) 0.0 - 0.8 AI Final     Comment:     Positive, suggests prev. exposure and probable immunity  Antibody index (AI) values reflect qualitative changes in antibody   concentration that cannot be directly associated with clinical condition or   disease state.       EBV Capsid Antibody IgG   Date Value Ref Range Status   07/19/2020 3.2 (H) 0.0 - 0.8 AI Final     Comment:     Positive, suggests recent or past exposure  Antibody index (AI) values reflect qualitative changes in antibody   concentration that cannot be directly associated with clinical condition or   disease state.     07/08/2019 2.4 (H) 0.0 - 0.8 AI Final     Comment:     Positive, suggests recent or past exposure  Antibody index (AI) values reflect qualitative changes in antibody   concentration that cannot be directly associated with clinical condition or   disease state.       Toxoplasma Antibody IgG   Date Value Ref Range Status   07/08/2019 <3.0 0.0 - 7.1 IU/mL Final     Comment:     Negative- Absence of detectable Toxoplasma gondii IgG antibodies. A negative   result does not rule out acute infection.  The magnitude of the measured result is not indicative of the amount of   antibody present. The concentrations of anti-Toxoplasma gondii IgG in a given   specimen determined with assays from different manufacturers can vary due to   differences in assay methods and reagent  specificity.       Herpes Simplex Virus Type 1 IgG   Date Value Ref Range Status   07/08/2019 >8.0 (H) 0.0 - 0.8 AI Final     Comment:     Positive.  IgG antibody to HSV-1 detected.  Antibody index (AI) values reflect qualitative changes in antibody   concentration that cannot be directly associated with clinical condition or   disease state.       Herpes Simplex Virus Type 2 IgG   Date Value Ref Range Status   07/08/2019 <0.2 0.0 - 0.8 AI Final     Comment:     No HSV-2 IgG antibodies detected.  Antibody index (AI) values reflect qualitative changes in antibody   concentration that cannot be directly associated with clinical condition or   disease state.         Imaging:  Recent Results (from the past 48 hour(s))   XR Chest Port 1 View    Addendum: 7/19/2020    Addendum:     In retrospect right IJ central venous catheter tip extending to  project over right subclavian.  This line was removed as documented on  same date radiograph that follows at 1526 hours.     CHANELLE RODRÍGUEZ MD      Narrative    EXAM: XR CHEST PORT 1 VW  7/19/2020 2:01 PM      HISTORY: Central Line    COMPARISON: 7/19/2020 at 0336 hours    TECHNIQUE: AP chest radiograph    FINDINGS:   Stable postsurgical changes of the chest. Unchanged enlarged cardiac  silhouette. Unchanged left chest wall cardiac device. Right upper  extremity approach PICC with tip projecting over the superior right  atrium.  Intra-aortic balloon pump radiopaque marker projects at the  level of the inna. Unchanged prominent pulmonary vasculature.  Retrocardiac opacities, similar to comparison. No pleural effusion. No  pneumothorax.      Impression    IMPRESSION:  1.  Right upper extremity approach PICC with tip projecting over the  superior right atrium.  2.  Stable interstitial pulmonary edema.  3.  Retrocardiac opacities similar to prior, subsegmental atelectasis  versus infection.    I have personally reviewed the examination and initial interpretation  and I agree with the  findings.    CHANELLE RODRÍGUEZ MD   XR Chest Port 1 View    Narrative    EXAM: XR CHEST PORT 1 VW  7/19/2020 3:29 PM      HISTORY: Post left IJ HD line placed, Right IJ removed    COMPARISON: 7/19/2020 at 1350 hours    TECHNIQUE: Portable single view of the chest 60 degrees    FINDINGS:   Stable postsurgical changes of the chest. Unchanged enlarged cardiac  silhouette. Unchanged left chest wall cardiac device. In retrospect  right IJ central venous catheter tip extending to project over right  subclavian.  This line has been removed. Left IJ central venous  catheter tip projects over mid right atrium. Right upper extremity  approach PICC with tip projecting over the superior right atrium.   Intra-aortic balloon pump radiopaque marker projects at the level of  the inna. Unchanged prominent pulmonary vasculature. Retrocardiac  opacities, stable to decreased. No pleural effusion. No pneumothorax.      Impression    IMPRESSION:   1.  Previously seen right IJ central venous catheter has been removed.     2. Left IJ central venous catheter tip projects over mid atrium.  No  pneumothorax. Other support devices and findings as above.    CHANELLE RODRÍGUEZ MD   XR Chest Port 1 View    Narrative    Exam: XR CHEST PORT 1 VW, 7/20/2020 1:43 AM    Indication: needle count in o.r.    Comparison: Chest 7/19/2020    Findings: Single AP portable chest radiograph. Endotracheal tube  approximately 4.5 cm above the inna. Right [place] catheter tip  terminating over the main pulmonary artery. Intra-aortic balloon pump  marker at the level of the inna. Pericardial/mediastinal drain.  Inferior approach central venous catheter terminating over the mid  SVC. Right basilar chest tube. Right upper extremity PICC terminating  in the mid SVC. Nasogastric tube terminating over the gastric body.  Parallel linear radiopacities projecting over the lower mediastinum,  immediately inferior to the most inferior sternotomy wire.    Postoperative changes  of heart transplant. Median sternotomy wires.  Heart size within normal limits. Pneumomediastinum. Perihilar  fullness. Right basilar opacity. No pneumothorax. No pleural effusion.  Upper abdomen is unremarkable.      Impression    Impression:   1a. Postoperative changes of heart transplant, lines and tubes as  described in findings.  1b. Linear radiopacity projecting over the inferior mediastinum  confirmed with OR nurse and fellow as LA line.   2. Postoperative pneumomediastinum.  3. Perihilar fullness and right basilar opacity, likely atelectasis..    I have personally reviewed the examination and initial interpretation  and I agree with the findings.    HARI RIZO MD   XR Chest Port 1 View    Narrative    Exam: XR CHEST PORT 1 VW, 7/20/2020 3:10 AM    Indication: heart txp    Comparison: Same day chest radiograph    Findings: Single portable chest radiograph. Endotracheal tube in the  mid thoracic trachea, approximately 3.3 cm above the inna. Right  [place] catheter terminating over the main pulmonary artery.  Pericardial and mediastinal drains are unchanged. Right basilar chest  tube in stable position. Triple pump marker terminating approximately  1 cm below the inna. LA line in stable position. Right upper  extremity PICC tip terminating in the mid SVC. Enteric tube  terminating over the gastric body. Median sternotomy wires.    Trachea is midline. Heart size is stable. Mild pneumomediastinum.  Bilateral perihilar airspace opacities. Small right pleural effusion.  No pneumothorax. Upper abdomen is unremarkable.      Impression    Impression:   1. Postoperative changes of heart transplant. Support devices in  stable position.  2. Perihilar and right basilar opacity, likely atelectasis.  3. Small right pleural effusion.    I have personally reviewed the examination and initial interpretation  and I agree with the findings.    HARI RIZO MD   Echo Limited    Narrative     [id number]  [id number]  [id number]  [id number]           [facility],[place]  Echocardiography Laboratory  [address] [place] [id number]     Name: BUCK CABAN  MRN: [id number]  : 1955  Study Date: 2020 08:28 AM  Age: 64 yrs  Gender: Male  Patient Location: [facility]  Reason For Study: Transplant - Heart (POD 1)  Ordering Physician: KAYLI VANESSA  Referring Physician: SHONDA MERCHANT  Performed By: Yfn Unger RDCS     BSA: 1.8 m2  Height: 64 in  Weight: 166 lb  HR: 115  _____________________________________________________________________________  __        Procedure  Limited Portable Echo Adult. Technically difficult study.Extremely poor  acoustic windows. Limited information was obtained during study.  _____________________________________________________________________________  __        Interpretation Summary  Technically difficult study.Extremely poor acoustic windows.  Limited information was obtained during study.  Left ventricular size is normal.  The Ejection Fraction is estimated at 20-25%.  Global right ventricular function is severely reduced.  Dilation of the inferior vena cava is present with abnormal respiratory  variation in diameter.  No pericardial effusion is present.  _____________________________________________________________________________  __        Left Ventricle  Left ventricular size is normal. Borderline concentric wall thickening  consistent with left ventricular hypertrophy is present. The Ejection Fraction  is estimated at 20-25%. Severe diffuse hypokinesis is present.     Right Ventricle  Global right ventricular function is severely reduced.     Vessels  Dilation of the inferior vena cava is present with abnormal respiratory  variation in diameter.     Pericardium  No pericardial effusion is present.     _____________________________________________________________________________  __  MMode/2D  Measurements & Calculations  IVSd: 1.2 cm  LVIDd: 4.8 cm  LVIDs: 4.3 cm  LVPWd: 1.1 cm     FS: 9.5 %  LV mass(C)d: 201.9 grams  LV mass(C)dI: 111.7 grams/m2  RWT: 0.47           _____________________________________________________________________________  __           Report approved by: Ricardo Herzog 07/20/2020 09:21 AM      XR Abdomen Port 1 View    Narrative    Exam: XR ABDOMEN PORT 1 VW, 7/20/2020 3:06 PM    Indication: [place], patient has cardiac transplant.    Comparison: CT: 7/19/2019    Findings:   Single frontal radiograph of the abdomen. Partially visualized median  sternotomy wires. There are 2 enteric tubes. Gastric tube projects  over the proximal stomach, with sidehole past the GE junction. Second  larger bore feeding tube tip projects over the distal stomach in the  region of the pylorus. Right-sided inferiorly directed chest tube is  in appropriate position. There are 2 additional drains that are  partially visualized. Nonobstructive bowel gas pattern. No acute  osseous abnormalities. Postsurgical changes in the thorax. Please see  separate same-day chest radiograph.       Impression    Impression:   Gastric tube tip and sidehole of the proximal stomach. Feeding tube  tip present towards the distal stomach in the region of the pylorus.    I have personally reviewed the examination and initial interpretation  and I agree with the findings.    TASIA PERKINS MD   XR Chest Port 1 View    Narrative    XR CHEST PORT 1 VW  7/20/2020 3:07 PM      HISTORY: PA cath adjustment    COMPARISON: Chest x-ray same day.    FINDINGS:   Postsurgical changes of the chest. Sternotomy wires are intact.  Interval advancement of intra-aortic balloon pump with tip now  projecting at the level of the aortic arch. Right [place] catheter  tip terminates over the main pulmonary artery, unchanged from prior  exam. Pericardial and/or mediastinal drains are unchanged. Right chest  tube in stable position. Endotracheal tube in  stable position with tip  projecting over the mid trachea. Enteric tube tip courses below the  margin of the film. Right upper extremity central catheter tip  projects over the mid SVC.    Cardiomediastinal silhouette is within normal limits. Stable perihilar  and mediastinal opacities. No pneumothorax. Stable, mild  pneumomediastinum. Retrocardiac opacities.                                                                        Impression    IMPRESSION:   1. Postsurgical heart transplant changes with interval advancement of  intra-aortic balloon pump with tip now projecting at the level of the  aortic arch. Additional lines and tubes stable as above.    2. Stable retrocardiac opacities, favored to represent atelectasis.    3. Unchanged small right pleural effusion.    I have personally reviewed the examination and initial interpretation  and I agree with the findings.    KANDACE MENDOZA MD   XR Chest Port 1 View    Narrative    Exam: XR CHEST PORT 1 VW, 7/21/2020 6:55 AM    Indication: interval post op chest XR    Comparison: 7/20/2020    Findings: Single portable chest radiograph. Endotracheal tube in the  mid thoracic trachea. Right [place] catheter terminating over the  proximal right pulmonary artery. Right PICC, intra-aortic balloon  pump, and enteric tube are in stable position. Right chest tube,  mediastinal, and pericardial drains are in stable position. Vascular  catheter projects over the right axilla. Median sternotomy wires.  Trachea is midline. Heart size is within normal limits. Mild perihilar  fullness. Bilateral and left retrocardiac opacities.      Impression    Impression:   1. Support devices in stable position.  2. Mild bibasilar opacities, likely atelectasis, and trace bilateral  pleural effusions.    I have personally reviewed the examination and initial interpretation  and I agree with the findings.    HERMELINDO RIVAS MD

## 2020-07-21 NOTE — OP NOTE
Procedure Date: 07/19/2020      OPERATING AREA:  Room 26.      PREOPERATIVE  DIAGNOSIS:  Endstage heart disease secondary to ischemic cardiomyopathy.      POSTOPERATIVE DIAGNOSIS:  Endstage heart disease secondary to ischemic cardiomyopathy.      PROCEDURES PERFORMED:     1.  Redo sternotomy.   2.  Institution of central cardiopulmonary bypass using bicaval cannulation.   3.  Backbench preparation of the donor heart.   4.  Orthotopic heart transplant.   5.  Removal of ICD.      ATTENDING SURGEON:  Massimo Griselli, MD.      CO-SURGEON:  Eladia Alonzo MD.        A second attending surgeon was necessary because of the complexity of the operation and the patient had had a previous sternotomy, and we both did aspects of the operation.      ASSISTANT: Dr. Giorgio Corral, who prepared the heart on the backtable and assisted in closing.     SKIN PREP:  Betadine and DuraPrep.      DRAINS:  Per Dr. Griselli.      CULTURE:  None.      SPECIMEN:  Recipient heart.      CLOSURE:  Routine.      INDICATIONS FOR PROCEDURE:  Mr. Sanabria is a 64-year-old gentleman with a past medical history of hypertension, type 2 diabetes, obesity, chronic kidney disease, obstructive sleep apnea, ischemic cardiomyopathy and severe left ventricular systolic dysfunction.  He underwent triple vessel coronary artery bypass grafting in 2008.  He had placement of an ICD in 04/2015.  He was admitted for possible LVAD transplant workup and a right subclavian intra-aortic balloon pump was placed last week.  Today an appropriate donor heart became available for him.      FINDINGS AT OPERATION:  The adhesions were moderate between the heart and the pericardium.  The left internal mammary graft was located and appropriately clipped off.  There was good size match at all 5 anastomotic sites and the donor heart was of good quality without a PFO and worked well right away.        DESCRIPTION OF PROCEDURE:  The patient arrived in the operating room with  an intraaortic balloon pump in place.  Satisfactory general endotracheal anesthesia was induced.  The necessary monitoring lines were placed by Anesthesia.  The patient's neck, chest, abdomen and both groins were prepped and draped in a standard sterile fashion.  A complete redo median sternotomy was performed and was uneventful.  Complete dissection of the heart, great vessels and veins was done and heparin was administered.  Central cardiopulmonary bypass was established with bicaval cannulation.  The subclavian intra-aortic balloon pump was placed on standby.  The patient was cooled to 34 degrees centigrade.  The ascending aorta was cross-clamped and the patient was placed on total cardiopulmonary bypass.  The recipient heart was excised.  On the back table, the donor heart was prepared and then brought onto the operative field.  The left atrial cuff on the recipient was prepared for anastomosis.  The left atrial anastomosis was performed first using running 3-0 Prolene.  Next, the pulmonary arterial anastomosis was performed using running 5-0 Prolene and the aortic and IVC anastomoses were performed using running 4-0 Prolene.  The superior vena caval anastomosis was performed using continuous 5-0 Prolene.  As this last anastomosis was being performed, gentle warming was begun.  Deairing maneuvers were performed.  The patient received methylprednisolone prior to release of the crossclamp and the aortic crossclamp was released.  The heart resumed good contractility and a slow sinus rhythm.   Four pacing wires were applied; 2 right ventricular and 2 right atrial.  The patient was atrially paced at a rate of 115 per minute.  After reperfusion for about 60 minutes, cardiopulmonary bypass was discontinued on epinephrine, vasopressin, isuprel and norepinephrine, all in small doses.  The intraaortic balloon pump was restarted at 1:1.  Transesophageal echo showed good left ventricular and right ventricular function.   There were no anastomotic problems.  Protamine was administered to reverse the heparin and hemostasis was satisfactory.  A left atrial line was inserted.  The patient was decannulated and the cannulation sites oversewn with 4-0 Prolene.  The remainder of the procedure was then performed by Dr. Griselli and Dr. Kohler.         ASHLEY TRACY MD             D: 2020   T: 2020   MT:       Name:     BUCK CABAN   MRN:      [id number]        Account:        [id number]   :      1955           Procedure Date: 2020      Document: [id number]       cc: [facility]illing

## 2020-07-21 NOTE — PHARMACY
Patient is being treated for hyperkalemia. A pharmacy consult was initiated to review the patient's medication list for possible causes of hyperkalemia.  Patient has potassium protocol with some doses given in the last couple days. However, no additinal doses will be triggered until potassium levels are lower.     Continue current medication regimen.

## 2020-07-22 ENCOUNTER — APPOINTMENT (OUTPATIENT)
Dept: GENERAL RADIOLOGY | Facility: CLINIC | Age: 65
DRG: 001 | End: 2020-07-22
Attending: INTERNAL MEDICINE
Payer: COMMERCIAL

## 2020-07-22 ENCOUNTER — APPOINTMENT (OUTPATIENT)
Dept: CARDIOLOGY | Facility: CLINIC | Age: 65
DRG: 001 | End: 2020-07-22
Attending: INTERNAL MEDICINE
Payer: COMMERCIAL

## 2020-07-22 ENCOUNTER — APPOINTMENT (OUTPATIENT)
Dept: ULTRASOUND IMAGING | Facility: CLINIC | Age: 65
DRG: 001 | End: 2020-07-22
Attending: INTERNAL MEDICINE
Payer: COMMERCIAL

## 2020-07-22 ENCOUNTER — APPOINTMENT (OUTPATIENT)
Dept: INTERPRETER SERVICES | Facility: CLINIC | Age: 65
End: 2020-07-22
Payer: COMMERCIAL

## 2020-07-22 LAB
ALBUMIN SERPL-MCNC: 2.4 G/DL (ref 3.4–5)
ALP SERPL-CCNC: 75 U/L (ref 40–150)
ALT SERPL W P-5'-P-CCNC: 25 U/L (ref 0–70)
ANION GAP SERPL CALCULATED.3IONS-SCNC: 5 MMOL/L (ref 3–14)
ANION GAP SERPL CALCULATED.3IONS-SCNC: 5 MMOL/L (ref 3–14)
APTT PPP: 31 SEC (ref 22–37)
APTT PPP: 59 SEC (ref 22–37)
AST SERPL W P-5'-P-CCNC: 28 U/L (ref 0–45)
BASE DEFICIT BLDA-SCNC: 2.5 MMOL/L
BASE DEFICIT BLDA-SCNC: 2.9 MMOL/L
BASE DEFICIT BLDV-SCNC: 0.9 MMOL/L
BASE DEFICIT BLDV-SCNC: 3.6 MMOL/L
BASE DEFICIT BLDV-SCNC: 5.3 MMOL/L
BASE EXCESS BLDA CALC-SCNC: 1.6 MMOL/L
BASE EXCESS BLDA CALC-SCNC: 2.2 MMOL/L
BASE EXCESS BLDA CALC-SCNC: 2.7 MMOL/L
BILIRUB DIRECT SERPL-MCNC: 0.2 MG/DL (ref 0–0.2)
BILIRUB SERPL-MCNC: 0.4 MG/DL (ref 0.2–1.3)
BUN SERPL-MCNC: 38 MG/DL (ref 7–30)
BUN SERPL-MCNC: 40 MG/DL (ref 7–30)
CA-I BLD-MCNC: 4.7 MG/DL (ref 4.4–5.2)
CA-I BLD-MCNC: 4.9 MG/DL (ref 4.4–5.2)
CALCIUM SERPL-MCNC: 8.1 MG/DL (ref 8.5–10.1)
CALCIUM SERPL-MCNC: 8.4 MG/DL (ref 8.5–10.1)
CHLORIDE SERPL-SCNC: 110 MMOL/L (ref 94–109)
CHLORIDE SERPL-SCNC: 110 MMOL/L (ref 94–109)
CO2 SERPL-SCNC: 25 MMOL/L (ref 20–32)
CO2 SERPL-SCNC: 27 MMOL/L (ref 20–32)
COPATH REPORT: NORMAL
CREAT SERPL-MCNC: 1.47 MG/DL (ref 0.66–1.25)
CREAT SERPL-MCNC: 1.54 MG/DL (ref 0.66–1.25)
ERYTHROCYTE [DISTWIDTH] IN BLOOD BY AUTOMATED COUNT: 17.2 % (ref 10–15)
ERYTHROCYTE [DISTWIDTH] IN BLOOD BY AUTOMATED COUNT: 17.3 % (ref 10–15)
GFR SERPL CREATININE-BSD FRML MDRD: 46 ML/MIN/{1.73_M2}
GFR SERPL CREATININE-BSD FRML MDRD: 49 ML/MIN/{1.73_M2}
GLUCOSE BLDC GLUCOMTR-MCNC: 110 MG/DL (ref 70–99)
GLUCOSE BLDC GLUCOMTR-MCNC: 110 MG/DL (ref 70–99)
GLUCOSE BLDC GLUCOMTR-MCNC: 111 MG/DL (ref 70–99)
GLUCOSE BLDC GLUCOMTR-MCNC: 113 MG/DL (ref 70–99)
GLUCOSE BLDC GLUCOMTR-MCNC: 116 MG/DL (ref 70–99)
GLUCOSE BLDC GLUCOMTR-MCNC: 117 MG/DL (ref 70–99)
GLUCOSE BLDC GLUCOMTR-MCNC: 120 MG/DL (ref 70–99)
GLUCOSE BLDC GLUCOMTR-MCNC: 125 MG/DL (ref 70–99)
GLUCOSE BLDC GLUCOMTR-MCNC: 126 MG/DL (ref 70–99)
GLUCOSE BLDC GLUCOMTR-MCNC: 128 MG/DL (ref 70–99)
GLUCOSE BLDC GLUCOMTR-MCNC: 135 MG/DL (ref 70–99)
GLUCOSE BLDC GLUCOMTR-MCNC: 139 MG/DL (ref 70–99)
GLUCOSE BLDC GLUCOMTR-MCNC: 145 MG/DL (ref 70–99)
GLUCOSE BLDC GLUCOMTR-MCNC: 147 MG/DL (ref 70–99)
GLUCOSE BLDC GLUCOMTR-MCNC: 149 MG/DL (ref 70–99)
GLUCOSE BLDC GLUCOMTR-MCNC: 150 MG/DL (ref 70–99)
GLUCOSE BLDC GLUCOMTR-MCNC: 152 MG/DL (ref 70–99)
GLUCOSE BLDC GLUCOMTR-MCNC: 154 MG/DL (ref 70–99)
GLUCOSE BLDC GLUCOMTR-MCNC: 169 MG/DL (ref 70–99)
GLUCOSE BLDC GLUCOMTR-MCNC: 171 MG/DL (ref 70–99)
GLUCOSE BLDC GLUCOMTR-MCNC: 174 MG/DL (ref 70–99)
GLUCOSE BLDC GLUCOMTR-MCNC: 81 MG/DL (ref 70–99)
GLUCOSE SERPL-MCNC: 121 MG/DL (ref 70–99)
GLUCOSE SERPL-MCNC: 134 MG/DL (ref 70–99)
HCO3 BLD-SCNC: 21 MMOL/L (ref 21–28)
HCO3 BLD-SCNC: 22 MMOL/L (ref 21–28)
HCO3 BLD-SCNC: 26 MMOL/L (ref 21–28)
HCO3 BLD-SCNC: 27 MMOL/L (ref 21–28)
HCO3 BLD-SCNC: 28 MMOL/L (ref 21–28)
HCO3 BLDV-SCNC: 19 MMOL/L (ref 21–28)
HCO3 BLDV-SCNC: 21 MMOL/L (ref 21–28)
HCO3 BLDV-SCNC: 24 MMOL/L (ref 21–28)
HCT VFR BLD AUTO: 25.3 % (ref 40–53)
HCT VFR BLD AUTO: 25.9 % (ref 40–53)
HGB BLD-MCNC: 8.2 G/DL (ref 13.3–17.7)
HGB BLD-MCNC: 8.4 G/DL (ref 13.3–17.7)
LAB SCANNED RESULT: ABNORMAL
LACTATE BLD-SCNC: 0.9 MMOL/L (ref 0.7–2)
LACTATE BLD-SCNC: 1 MMOL/L (ref 0.7–2)
LACTATE BLD-SCNC: 1.1 MMOL/L (ref 0.7–2)
MAGNESIUM SERPL-MCNC: 2.3 MG/DL (ref 1.6–2.3)
MAGNESIUM SERPL-MCNC: 2.3 MG/DL (ref 1.6–2.3)
MCH RBC QN AUTO: 30.5 PG (ref 26.5–33)
MCH RBC QN AUTO: 30.8 PG (ref 26.5–33)
MCHC RBC AUTO-ENTMCNC: 32.4 G/DL (ref 31.5–36.5)
MCHC RBC AUTO-ENTMCNC: 32.4 G/DL (ref 31.5–36.5)
MCV RBC AUTO: 94 FL (ref 78–100)
MCV RBC AUTO: 95 FL (ref 78–100)
O2/TOTAL GAS SETTING VFR VENT: 40 %
OXYHGB MFR BLD: 95 % (ref 92–100)
OXYHGB MFR BLD: 97 % (ref 92–100)
OXYHGB MFR BLD: 98 % (ref 92–100)
OXYHGB MFR BLD: 98 % (ref 92–100)
OXYHGB MFR BLDV: 71 %
OXYHGB MFR BLDV: 74 %
OXYHGB MFR BLDV: 76 %
PCO2 BLD: 32 MM HG (ref 35–45)
PCO2 BLD: 34 MM HG (ref 35–45)
PCO2 BLD: 38 MM HG (ref 35–45)
PCO2 BLD: 39 MM HG (ref 35–45)
PCO2 BLD: 45 MM HG (ref 35–45)
PCO2 BLDV: 30 MM HG (ref 40–50)
PCO2 BLDV: 36 MM HG (ref 40–50)
PCO2 BLDV: 41 MM HG (ref 40–50)
PH BLD: 7.4 PH (ref 7.35–7.45)
PH BLD: 7.41 PH (ref 7.35–7.45)
PH BLD: 7.43 PH (ref 7.35–7.45)
PH BLD: 7.44 PH (ref 7.35–7.45)
PH BLD: 7.44 PH (ref 7.35–7.45)
PH BLDV: 7.38 PH (ref 7.32–7.43)
PH BLDV: 7.38 PH (ref 7.32–7.43)
PH BLDV: 7.41 PH (ref 7.32–7.43)
PHOSPHATE SERPL-MCNC: 2.8 MG/DL (ref 2.5–4.5)
PHOSPHATE SERPL-MCNC: 4 MG/DL (ref 2.5–4.5)
PLATELET # BLD AUTO: 123 10E9/L (ref 150–450)
PLATELET # BLD AUTO: 129 10E9/L (ref 150–450)
PO2 BLD: 103 MM HG (ref 80–105)
PO2 BLD: 104 MM HG (ref 80–105)
PO2 BLD: 111 MM HG (ref 80–105)
PO2 BLD: 123 MM HG (ref 80–105)
PO2 BLD: 80 MM HG (ref 80–105)
PO2 BLDV: 38 MM HG (ref 25–47)
PO2 BLDV: 40 MM HG (ref 25–47)
PO2 BLDV: 43 MM HG (ref 25–47)
POTASSIUM SERPL-SCNC: 4.6 MMOL/L (ref 3.4–5.3)
POTASSIUM SERPL-SCNC: 4.9 MMOL/L (ref 3.4–5.3)
PROT SERPL-MCNC: 5.5 G/DL (ref 6.8–8.8)
RADIOLOGIST FLAGS: ABNORMAL
RBC # BLD AUTO: 2.69 10E12/L (ref 4.4–5.9)
RBC # BLD AUTO: 2.73 10E12/L (ref 4.4–5.9)
SODIUM SERPL-SCNC: 140 MMOL/L (ref 133–144)
SODIUM SERPL-SCNC: 142 MMOL/L (ref 133–144)
WBC # BLD AUTO: 22.5 10E9/L (ref 4–11)
WBC # BLD AUTO: 24.8 10E9/L (ref 4–11)

## 2020-07-22 PROCEDURE — 25800030 ZZH RX IP 258 OP 636: Performed by: SURGERY

## 2020-07-22 PROCEDURE — 40000014 ZZH STATISTIC ARTERIAL MONITORING DAILY

## 2020-07-22 PROCEDURE — 93970 EXTREMITY STUDY: CPT | Mod: XS

## 2020-07-22 PROCEDURE — 83735 ASSAY OF MAGNESIUM: CPT | Performed by: SURGERY

## 2020-07-22 PROCEDURE — 82805 BLOOD GASES W/O2 SATURATION: CPT | Performed by: PEDIATRICS

## 2020-07-22 PROCEDURE — 25000128 H RX IP 250 OP 636: Performed by: STUDENT IN AN ORGANIZED HEALTH CARE EDUCATION/TRAINING PROGRAM

## 2020-07-22 PROCEDURE — 85027 COMPLETE CBC AUTOMATED: CPT | Performed by: SURGERY

## 2020-07-22 PROCEDURE — 40000076 ZZH STATISTIC IABP MONITORING

## 2020-07-22 PROCEDURE — 85730 THROMBOPLASTIN TIME PARTIAL: CPT | Performed by: STUDENT IN AN ORGANIZED HEALTH CARE EDUCATION/TRAINING PROGRAM

## 2020-07-22 PROCEDURE — 25000132 ZZH RX MED GY IP 250 OP 250 PS 637: Performed by: INTERNAL MEDICINE

## 2020-07-22 PROCEDURE — 20000004 ZZH R&B ICU UMMC

## 2020-07-22 PROCEDURE — 93970 EXTREMITY STUDY: CPT

## 2020-07-22 PROCEDURE — 71045 X-RAY EXAM CHEST 1 VIEW: CPT

## 2020-07-22 PROCEDURE — 93321 DOPPLER ECHO F-UP/LMTD STD: CPT | Mod: 26 | Performed by: INTERNAL MEDICINE

## 2020-07-22 PROCEDURE — 25000128 H RX IP 250 OP 636: Performed by: SURGERY

## 2020-07-22 PROCEDURE — 80076 HEPATIC FUNCTION PANEL: CPT | Performed by: SURGERY

## 2020-07-22 PROCEDURE — 40000275 ZZH STATISTIC RCP TIME EA 10 MIN

## 2020-07-22 PROCEDURE — 25000131 ZZH RX MED GY IP 250 OP 636 PS 637: Performed by: STUDENT IN AN ORGANIZED HEALTH CARE EDUCATION/TRAINING PROGRAM

## 2020-07-22 PROCEDURE — 93325 DOPPLER ECHO COLOR FLOW MAPG: CPT

## 2020-07-22 PROCEDURE — 80048 BASIC METABOLIC PNL TOTAL CA: CPT | Performed by: SURGERY

## 2020-07-22 PROCEDURE — 85730 THROMBOPLASTIN TIME PARTIAL: CPT | Performed by: SURGERY

## 2020-07-22 PROCEDURE — 40000048 ZZH STATISTIC DAILY SWAN MONITORING

## 2020-07-22 PROCEDURE — 94003 VENT MGMT INPAT SUBQ DAY: CPT

## 2020-07-22 PROCEDURE — 83605 ASSAY OF LACTIC ACID: CPT | Performed by: PEDIATRICS

## 2020-07-22 PROCEDURE — 27210429 ZZH NUTRITION PRODUCT INTERMEDIATE LITER

## 2020-07-22 PROCEDURE — 25000132 ZZH RX MED GY IP 250 OP 250 PS 637: Performed by: PEDIATRICS

## 2020-07-22 PROCEDURE — 93308 TTE F-UP OR LMTD: CPT | Mod: 26 | Performed by: INTERNAL MEDICINE

## 2020-07-22 PROCEDURE — 00000146 ZZHCL STATISTIC GLUCOSE BY METER IP

## 2020-07-22 PROCEDURE — 25000125 ZZHC RX 250: Performed by: PEDIATRICS

## 2020-07-22 PROCEDURE — 40000196 ZZH STATISTIC RAPCV CVP MONITORING

## 2020-07-22 PROCEDURE — 25800030 ZZH RX IP 258 OP 636: Performed by: STUDENT IN AN ORGANIZED HEALTH CARE EDUCATION/TRAINING PROGRAM

## 2020-07-22 PROCEDURE — 25000132 ZZH RX MED GY IP 250 OP 250 PS 637: Performed by: SURGERY

## 2020-07-22 PROCEDURE — 25000132 ZZH RX MED GY IP 250 OP 250 PS 637: Performed by: STUDENT IN AN ORGANIZED HEALTH CARE EDUCATION/TRAINING PROGRAM

## 2020-07-22 PROCEDURE — 84100 ASSAY OF PHOSPHORUS: CPT | Performed by: SURGERY

## 2020-07-22 PROCEDURE — 93325 DOPPLER ECHO COLOR FLOW MAPG: CPT | Mod: 26 | Performed by: INTERNAL MEDICINE

## 2020-07-22 PROCEDURE — 99233 SBSQ HOSP IP/OBS HIGH 50: CPT | Mod: 25 | Performed by: INTERNAL MEDICINE

## 2020-07-22 PROCEDURE — 82803 BLOOD GASES ANY COMBINATION: CPT | Performed by: STUDENT IN AN ORGANIZED HEALTH CARE EDUCATION/TRAINING PROGRAM

## 2020-07-22 PROCEDURE — 82330 ASSAY OF CALCIUM: CPT | Performed by: PEDIATRICS

## 2020-07-22 PROCEDURE — C9113 INJ PANTOPRAZOLE SODIUM, VIA: HCPCS | Performed by: SURGERY

## 2020-07-22 PROCEDURE — 94799 UNLISTED PULMONARY SVC/PX: CPT

## 2020-07-22 PROCEDURE — C9399 UNCLASSIFIED DRUGS OR BIOLOG: HCPCS

## 2020-07-22 PROCEDURE — 25000125 ZZHC RX 250: Performed by: SURGERY

## 2020-07-22 RX ORDER — ASPIRIN 81 MG/1
81 TABLET, CHEWABLE ORAL DAILY
Status: DISCONTINUED | OUTPATIENT
Start: 2020-07-23 | End: 2020-07-31

## 2020-07-22 RX ORDER — BISACODYL 10 MG
10 SUPPOSITORY, RECTAL RECTAL ONCE
Status: COMPLETED | OUTPATIENT
Start: 2020-07-22 | End: 2020-07-22

## 2020-07-22 RX ORDER — NALOXONE HYDROCHLORIDE 0.4 MG/ML
.1-.4 INJECTION, SOLUTION INTRAMUSCULAR; INTRAVENOUS; SUBCUTANEOUS
Status: DISCONTINUED | OUTPATIENT
Start: 2020-07-22 | End: 2020-08-03 | Stop reason: HOSPADM

## 2020-07-22 RX ORDER — BISACODYL 10 MG
10 SUPPOSITORY, RECTAL RECTAL DAILY PRN
Status: DISCONTINUED | OUTPATIENT
Start: 2020-07-22 | End: 2020-08-03 | Stop reason: HOSPADM

## 2020-07-22 RX ADMIN — STANDARDIZED SENNA CONCENTRATE AND DOCUSATE SODIUM 2 TABLET: 8.6; 5 TABLET ORAL at 07:20

## 2020-07-22 RX ADMIN — BIVALIRUDIN 0.08 MG/KG/HR: 250 INJECTION, POWDER, LYOPHILIZED, FOR SOLUTION INTRAVENOUS at 17:23

## 2020-07-22 RX ADMIN — VANCOMYCIN HYDROCHLORIDE 1500 MG: 10 INJECTION, POWDER, LYOPHILIZED, FOR SOLUTION INTRAVENOUS at 13:30

## 2020-07-22 RX ADMIN — HUMAN INSULIN 10 UNITS/HR: 100 INJECTION, SOLUTION SUBCUTANEOUS at 07:21

## 2020-07-22 RX ADMIN — PANTOPRAZOLE SODIUM 40 MG: 40 INJECTION, POWDER, FOR SOLUTION INTRAVENOUS at 07:21

## 2020-07-22 RX ADMIN — MULTIVITAMIN 15 ML: LIQUID ORAL at 07:20

## 2020-07-22 RX ADMIN — HUMAN INSULIN 1 UNITS/HR: 100 INJECTION, SOLUTION SUBCUTANEOUS at 20:13

## 2020-07-22 RX ADMIN — ISOPROTERENOL HYDROCHLORIDE 0.01 MCG/KG/MIN: 0.2 INJECTION, SOLUTION INTRAMUSCULAR; INTRAVENOUS at 21:31

## 2020-07-22 RX ADMIN — ACETAMINOPHEN 975 MG: 325 TABLET, FILM COATED ORAL at 19:46

## 2020-07-22 RX ADMIN — PROPOFOL 35 MCG/KG/MIN: 10 INJECTION, EMULSION INTRAVENOUS at 00:40

## 2020-07-22 RX ADMIN — PREDNISONE 40 MG: 20 TABLET ORAL at 07:20

## 2020-07-22 RX ADMIN — POLYETHYLENE GLYCOL 3350 17 G: 17 POWDER, FOR SOLUTION ORAL at 07:20

## 2020-07-22 RX ADMIN — HUMAN INSULIN 10 UNITS/HR: 100 INJECTION, SOLUTION SUBCUTANEOUS at 05:36

## 2020-07-22 RX ADMIN — PREDNISONE 40 MG: 20 TABLET ORAL at 19:46

## 2020-07-22 RX ADMIN — NYSTATIN 1000000 UNITS: 100000 SUSPENSION ORAL at 19:47

## 2020-07-22 RX ADMIN — NYSTATIN 1000000 UNITS: 100000 SUSPENSION ORAL at 11:36

## 2020-07-22 RX ADMIN — HUMAN INSULIN 8 UNITS/HR: 100 INJECTION, SOLUTION SUBCUTANEOUS at 12:47

## 2020-07-22 RX ADMIN — HUMAN INSULIN 12 UNITS/HR: 100 INJECTION, SOLUTION SUBCUTANEOUS at 01:39

## 2020-07-22 RX ADMIN — ACETAMINOPHEN 975 MG: 325 TABLET, FILM COATED ORAL at 03:20

## 2020-07-22 RX ADMIN — STANDARDIZED SENNA CONCENTRATE AND DOCUSATE SODIUM 2 TABLET: 8.6; 5 TABLET ORAL at 19:46

## 2020-07-22 RX ADMIN — GABAPENTIN 100 MG: 100 CAPSULE ORAL at 07:20

## 2020-07-22 RX ADMIN — EPINEPHRINE 0.02 MCG/KG/MIN: 1 INJECTION PARENTERAL at 19:47

## 2020-07-22 RX ADMIN — BISACODYL 10 MG: 10 SUPPOSITORY RECTAL at 11:36

## 2020-07-22 RX ADMIN — MYCOPHENOLATE MOFETIL 1500 MG: 500 INJECTION, POWDER, LYOPHILIZED, FOR SOLUTION INTRAVENOUS at 06:40

## 2020-07-22 RX ADMIN — CEFTRIAXONE SODIUM 2 G: 2 INJECTION, POWDER, FOR SOLUTION INTRAMUSCULAR; INTRAVENOUS at 15:00

## 2020-07-22 RX ADMIN — ACETAMINOPHEN 975 MG: 325 TABLET, FILM COATED ORAL at 11:36

## 2020-07-22 RX ADMIN — MYCOPHENOLATE MOFETIL 1500 MG: 500 INJECTION, POWDER, LYOPHILIZED, FOR SOLUTION INTRAVENOUS at 19:46

## 2020-07-22 RX ADMIN — NYSTATIN 1000000 UNITS: 100000 SUSPENSION ORAL at 07:20

## 2020-07-22 ASSESSMENT — ACTIVITIES OF DAILY LIVING (ADL)
ADLS_ACUITY_SCORE: 15
ADLS_ACUITY_SCORE: 17

## 2020-07-22 ASSESSMENT — MIFFLIN-ST. JEOR: SCORE: 1533

## 2020-07-22 NOTE — PROGRESS NOTES
[facility]   Cardiology II Service / Advanced Heart Failure  Progress note    Lucian Morillo  : 1955  MRN # [id number]    ADMIT DATE: 7/3/2020    Changes today  - Limited TTE  shows improved LV fxn (50-55%)  - IABP removed per CT surgery     - Wean Lalito   - PST       - CORRINE positive, plan for AC pending discussion between CT surgery and heme/onc   - Likely start bactrim tomorrow and valcyte in coming days     ASSESSMENT & PLAN:  Lucian Henderson Sr. is a 64 year old male with a PMH of HTN, DMII, obesity, CKD, CHANTEL, ischemic cardiomyopathy and severe LV systolic dysfunction s/p 3V CABG () and primary prevention ICD ( 4/2/15). Currently he presents with 3 days of worsening fatigue and SOB with minimal exertion. Patient claims he could walk 1-2 blocks last week but has been barely able to ambulate home recently.     Admitted for possible LVAD/Transplant work up. IABP and listed status 2 for heart transplant . HIT + , underwent PLEX, s/p . Post operative course complicated by graft dysfunction and VT. Graft function back to near normal .      Date RA MPAP PCWP SV02 Jacy CO Jacy CI MAP SVR PVR Intervention   20 8 50 38 49 2.3 1.28 89 2400 5.2    20  6 48 28 35 4.0 2.1 69 1300 2.2 Nipride, PO afterload reduction    7/10 9 56/34 32 68 5.1 2.7 68 900 1.8 Nipride 1, PO afterload reduction    14 65/42/50 36 60 4.3 2.2 89 1100 1.8 Nipride 0.25, PO afterload reduction    7/15 8 52/28/36 24 66 4.1 2.2 100 1200 2.9 IABP 1:1, PO afterload reduction     7 50/24/33 24 70 5.6 3.0 94 1200 1.6 IABP 1:1, Dopamine 2.5    9 56/24/35 22 62 4.8 2.6 75 1100 2.7 IABP 1:1    15 32//24 16 58 5.2 2.8 61 746 1.9 IABP 1:1, Epi 0.05, NE 0.03, Vas    10  16 72 6.0 3.5 71 748 0.83 IABP 1;1, Epi 0.05, NE 0.05    11  16 74 7.4 3.9 71 700 0.95 IABP, epi 0.02, NE 0.02       # ICM s/p 3V CABG () and primary prevention ICD (  4/2/15)  # HFrEF Stage D, NYHA III-IV s/p OHT 7/19  # Ambulatory cardiogenic shock  # Arrhythmia: HF associated VT/VF, VT (7/20, post op from OHT)   # Graft dysfunction (EF 20-25%)   Graft Function: TTE POD#1 w/ LVEF 20-25%, RV severely reduced, suspect 2/2 to ischemic time, minimal change in LV fxn 7/21, consider IABP wean tomorrow    --Volume: slightly volume up (goal CVP 10-12), lasix PRN   --BP: MAP goal >65   --HR: Goal >100,   -->isopreterenol at 0.01 (can add terbutaline 10 mg PO Q6H to wean in coming days)   -->temporary pacer set at  given VT     Pressors/Inotropes:  -continue NE and epi, wean   -NO at 20, wean off 7/22    Immunosuppression:  -s/p Simulect on POD#0, second dose POD#4 (7/23)  -MMF 1500 mg BID  -s/p solumedrol 125 mg q8h  --> transitioned to prednisone  w/ taper   -will start Tacro later   -routine endomyocardial bx 7 days post transplant (7/27)      Antibiotics:   - continue vancomycin (will get 3 days)  - continue cefepime (if pt came from home w)  - Nystatin Swish&Swallow  - start bactrim in coming days   - CMV: R+ / D+, start valcyte in coming days   - EBV: R+ / D+  - f/u blood cx, NGTD   - donor blood cx growing strep salivarius in 1/2 bottles  -->transplant ID consult 7/21, rec narrow to ceftriaxone     # Atrial Flutter with RVR  Patient went into Atrial flutter with RVR with rates around 150 overnight on 7/7/2020 at around 2 am. Converted to NSR with amiodarone, which was stopped 7/19 given OHT.     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 Terrazas.     Héctor Smith MD   Cardiology Fellow, PGY-5  [phone]   ..........................................................................................................................................................  Subjective:  SARITA overnight. No  "arrhythmias. Following commands.     PHYSICAL EXAM:  BP (!) 153/50   Pulse 115   Temp 97.3  F (36.3  C) (Pulmonary Artery)   Resp 17   Ht 1.626 m (5' 4\")   Wt 84.2 kg (185 lb 10 oz)   SpO2 98%   BMI 31.86 kg/m    GENERAL: Intubated and sedated   NECK: Supple and without lymphadenopathy. JVP 11 cm  CV: S1/S2 heard without murmur   RESPIRATORY: Mechanical breath sounds   GI: Soft and distended. No tenderness, rebound, guarding. No palpable organomegaly.   EXTREMITIES: Peripheral edema trace.   NEUROLOGIC: Follows commands, moves all four extremities   MUSCULOSKELETAL: No joint swelling or tenderness.   SKIN: No jaundice. No acute rashes or lesions.     ROS:   12-pt ROS otherwise negative except as noted above    PMH:  Past Medical History:   Diagnosis Date     CAD (coronary artery disease)      CHF (congestive heart failure) (H)      CKD (chronic kidney disease), stage III (H)      Cortical cataract of both eyes      Diabetes (H)      Hyperlipidemia      Hypertension      Ischemic cardiomyopathy      Obesity      CHANTEL (obstructive sleep apnea)     occas cpap     Osteoarthritis        PSH:  Past Surgical History:   Procedure Laterality Date     C CABG, ARTERY-VEIN, THREE  02/2008     CATARACT IOL, RT/LT       COLONOSCOPY N/A 8/7/2019    Procedure: COLONOSCOPY, WITH POLYPECTOMY AND BIOPSY;  Surgeon: Chauncey Morataya MD;  Location:  GI     CV RIGHT HEART CATH N/A 3/25/2019    Procedure: CV RIGHT HEART CATH;  Surgeon: Moises Santos MD;  Location:  HEART CARDIAC CATH LAB     CV RIGHT HEART CATH N/A 7/10/2019    Procedure: CV RIGHT HEART CATH;  Surgeon: Jak Mccabe MD;  Location:  HEART CARDIAC CATH LAB     CV RIGHT HEART CATH N/A 7/8/2020    Procedure: Right Heart Cath with Leave In [place] already has ICU load;  Surgeon: Jak Mccabe MD;  Location:  HEART CARDIAC CATH LAB     EXTRACTION(S) DENTAL Left 7/13/2020    Procedure: EXTRACTION, TOOTH #11, 12, 13, 15, and 29;  Surgeon: Zhanna, " CARMITA Anderson;  Location: [facility] OR     IMPLANT AUTOMATIC IMPLANTABLE CARDIOVERTER DEFIBRILLATOR       INSERT INTRAAORTIC BALLOON PUMP N/A 7/16/2020    Procedure: Subclavian Intra-Aortic Balloon Pump Placement;  Surgeon: Allan Sparrow MD;  Location: [facility] OR     PHACOEMULSIFICATION CLEAR CORNEA WITH STANDARD IOL, VITRECTOMY PARSPLANA 25 GAGUE, COMBINED Left 12/10/2019    Procedure: PHACOEMULSIFICATION, CATARACT, CLEAR CORNEAL INCISION APPROACH, W STD INTRAOCULAR LENS IMPLANT INSERT + VITRECTOMY BY PARS PLANA  USING 25-GAUGE INSTRUMENTS. ENDOLASER, INFUSION OF 20% SF6 GAS;  Surgeon: Triny Bunch MD;  Location: [facility] OR     PICC INSERTION Right 07/11/2020    basilic 44 cm total      TRANSPLANT HEART RECIPIENT N/A 7/19/2020    Procedure: REDO MEDIAN STERNOTOMY, TRANSPLANT, ORTHOTOPIC HEART, RECIPIENT, ON PUMP OXYGENATOR, REMOVAL OF CARDIAC DEFIBRILLATOR AND LEAD;  Surgeon: Griselli, Massimo, MD;  Location: [facility] OR       MEDICATIONS:  Prior to Admission Medications   Prescriptions Last Dose Informant Patient Reported? Taking?   aspirin 81 MG tablet  Self No No   Sig: Take 1 tablet (81 mg) by mouth daily   atorvastatin (LIPITOR) 40 MG tablet  Self No No   Sig: Take 1 tablet (40 mg) by mouth daily   blood glucose (ACCU-CHEK SMARTVIEW) test strip  Self No No   Sig: USE TO TEST THREE TIMES DAILY AS DIRECTED   blood glucose (NO BRAND SPECIFIED) test strip  Self No No   Sig: Use to test blood sugar 2 times daily or as directed.   blood glucose monitoring (ACCU-CHEK FELIPE SMARTVIEW) meter device kit  Self No No   Sig: Use to test blood sugar 3-4 times daily, as directed.   blood glucose monitoring (ONE TOUCH DELICA) lancets  Self No No   Sig: Use to test blood sugars 2 times daily.   clopidogrel (PLAVIX) 75 MG tablet  Self No No   Sig: Take 1 tablet (75 mg) by mouth daily   exenatide ER (BYDUREON) 2 MG pen  Self No No   Sig: Inject 2 mg Subcutaneous every 7 days   furosemide (LASIX) 20 MG tablet  Self No No   Sig: Take 2  tablets (40 mg) by mouth 2 times daily   glimepiride (AMARYL) 4 MG tablet  Self No No   Sig: Take 1 tablet (4 mg) by mouth every morning (before breakfast)   hydrALAZINE (APRESOLINE) 25 MG tablet  Self No No   Sig: Take 1 tablet (25 mg) by mouth 3 times daily   insulin glargine (LANTUS SOLOSTAR) 100 UNIT/ML pen  Self No No   Sig: Take 8 units in the morning and 40 units in the evening   insulin pen needle (B-D U/F) 31G X 5 MM miscellaneous  Self No No   Si Units by Device route 2 times daily Use 2 pen needles daily or as directed.   isosorbide mononitrate (IMDUR) 60 MG 24 hr tablet  Self No No   Sig: Take 1 tablet (60 mg) by mouth daily   losartan (COZAAR) 50 MG tablet  Spouse/Significant Other No No   Sig: Take 1 tablet (50 mg) by mouth daily   nitroglycerin (NITROSTAT) 0.4 MG SL tablet  Self No No   Sig: Place 1 tablet (0.4 mg) under the tongue every 5 minutes as needed for chest pain If you are still having symptoms after 3 doses (15 minutes) call 911.   order for DME  Self No No   Sig: Auto CPAP 8-15 cmH20      Facility-Administered Medications Last Administration Doses Remaining   erythromycin (ROMYCIN) ophthalmic ointment None recorded    lidocaine (PF) (XYLOCAINE) 2 % injection 10 mL None recorded 1           ALLERGIES:     Allergies   Allergen Reactions     Heparin Heparin Induced Thrombocytopenia     HIT screen sent 2020. No heparin products until resulted     No Known Drug Allergies        FAMILY HISTORY:  Family History   Problem Relation Age of Onset     Diabetes Brother      Diabetes Sister      Diabetes Sister      Macular Degeneration No family hx of      Glaucoma No family hx of      Myocardial Infarction No family hx of      Kidney Disease No family hx of        SOCIAL HISTORY:  Social History     Socioeconomic History     Marital status:      Spouse name: Not on file     Number of children: 4     Years of education: Not on file     Highest education level: Not on file   Occupational  History     Occupation:      Employer: [employer]     Employer: RETIRED   Social Needs     Financial resource strain: Not on file     Food insecurity     Worry: Not on file     Inability: Not on file     Transportation needs     Medical: Not on file     Non-medical: Not on file   Tobacco Use     Smoking status: Never Smoker     Smokeless tobacco: Never Used     Tobacco comment: Never smoked; non-smoking household   Substance and Sexual Activity     Alcohol use: No     Alcohol/week: 0.0 standard drinks     Drug use: No     Sexual activity: Yes     Partners: Female   Lifestyle     Physical activity     Days per week: Not on file     Minutes per session: Not on file     Stress: Not on file   Relationships     Social connections     Talks on phone: Not on file     Gets together: Not on file     Attends Zoroastrian service: Not on file     Active member of club or organization: Not on file     Attends meetings of clubs or organizations: Not on file     Relationship status: Not on file     Intimate partner violence     Fear of current or ex partner: Not on file     Emotionally abused: Not on file     Physically abused: Not on file     Forced sexual activity: Not on file   Other Topics Concern     Parent/sibling w/ CABG, MI or angioplasty before 65F 55M? No   Social History Narrative     Not on file       LABS:  BMP  Recent Labs   Lab 07/22/20 0414 07/21/20 2153 07/21/20  1540 07/21/20  0856 07/21/20  0422 07/20/20  2302    142 141 141 141 142   POTASSIUM 4.6 4.4 4.6 4.6 5.6* 5.5*   CHLORIDE 110* 112* 110* 112* 109 111*   CO2 27 27 28 24 28 27   BUN 38* 36* 34* 32* 30 29   CR 1.54* 1.47* 1.62* 1.48* 1.58* 1.45*   * 160* 117* 195* 182* 178*   BRYANNA 8.4* 7.8* 8.6 7.6* 8.4* 8.4*   PHOS  --   --  4.7* 4.3 4.3 2.2*     CBC  Recent Labs   Lab 07/22/20 0414 07/21/20 2153 07/21/20  1540 07/21/20  0422  07/19/20  1613   WBC 24.8* 25.4* 28.2* 24.6*   < > 7.7   HGB 8.2* 8.2* 9.1* 9.4*   < > 10.8*   HCT  25.3* 25.3* 27.4* 28.3*   < > 34.0*   MCV 94 95 93 91   < > 96   * 128* 140* 141*   < > 99*   LYMPH  --   --   --   --   --  7.9    < > = values in this interval not displayed.     INR  Recent Labs   Lab 07/20/20  1610 07/20/20  0506 07/20/20  0232 07/20/20  0018 07/19/20 2032   INR 1.35* 1.35* 1.52* 1.59* 1.23*   PTT  --  37 36 30 31     IMAGING:    RHC 6/18/20     RA 20/26/18  RV 85/28  PA 82/45/58  PCWP 45  Cardiac output by Jacy: 3.85 L/min (indexed to 2.09 L/min/m2)  Cardiac output by thermodilution: 3.63 L/min (indexed to 1.97 L/min/m2)  SVR (by TD CO): 1123  PVR (by TD CO): 7.4    Angiogram 6/18/20   3 vessel CAD s/p 3V CABG in 2008 (LIMA-LAD, SVG-OM1, SVG-RPDA)  2. Known proximal SVG-RPDA occlusion  3. Presumed occlusion of SVG-OM1 (unable to locate on non-selective aortic root shot)  4. Patent LIMA-LAD with collateralization of LAD to PDA    Echocardiogram ( 3/11/2019)   Moderate to severe left ventricular dilation is present.  Severely (EF 10-20%) reduced left ventricular function is present.  No significant valve dysfunction.  Compared to study done 6/5/17 there is no significant change in LV size and  systolic function    Echocardiogram ( 7/5/2020)   Interpretation Summary  Severely (EF 10-20%) reduced left ventricular function is present. LVEF 15%  based on biplane 2D tracing. Severe left ventricular dilation is present.  LVIDd:6.8 cm. Grade III or advanced diastolic dysfunction.  The right ventricle is normal size.  Global right ventricular function is moderately reduced.  No significant valvular abnormalities were noted.  IVC diameter and respiratory changes fall into an intermediate range  suggesting an RA pressure of 8 mmHg.  Mild pulmonary hypertension is present.     This study was compared with the study from 3/25/2019. RV function has  worsened, LV size and function appear similar.  Devices  ICD ([employer]- 4/2/2015 )     I have reviewed today's vital signs, notes, medications,  labs and imaging.  I have also seen and examined the patient and agree with the findings and plan as outlined above.  Pt now off pressors with NO weaned to off and extubated with IABP removed.  97.3, , 102/58 and CVP 9 with CI 4. Lungs clear and tachy S1 and S2. Labs with Cr 1.54 and WBC 24.  Assessment: Pt S/P OHT on simulect protocol with review of TTE revealing an improvement of global heart fn from LVEF 25% to 45%.  Will continue IS with simulect on Day 4 and follow CT output.  CORRINE came back positive.  Will discuss with CVTS regarding the need for anticoag in the perioperative period which would be associated with increased risk of bleeding.  Pt seen X4 for total critical care time 45 min.     James Terrazas MD, PhD  Professor, Heart Failure and Cardiac Transplantation  [facility]

## 2020-07-22 NOTE — PHARMACY-CONSULT NOTE
Adult Heart Transplant Post Operative Note     64 year old male  s/p heart transplant on 7/20/20 for ischemic cardiomyopathy.  Planned immunosuppression regimen to include mycophenolate, prednisone and basiliximab (POD 0 and POD 4). Tacrolimus will be started in the the coming days (goal 10-12)  Opportunistic pathogen prophylaxis includes: nystatin. Trimethoprim/sulfamethoxazole and valganciclovir will be started in the coming days.  Patient is not enrolled in medication study.    Patient with planned immunosuppression and prophylaxis as above.  Pharmacy will monitor for medication interactions and immunosuppression levels in conjunction with the team. Medication therapy needs for discharge planning will continue to be addressed throughout the current admission via multidisciplinary rounds and order review. Pharmacy will make recommendations as appropriate.    Shaquille Hamilton, MertD

## 2020-07-22 NOTE — PROGRESS NOTES
Transplant Social Work Services Progress Note      Date of Initial Social Work Evaluation: 7/9/2019, 7/6/2020  Collaborated with: Pt's wife, Shivani, at the bedside    Data: Pt is s/p OHT on 7/19/2020. Pt is intubated, but opened up his eyes and gave indication he recognized writer. Provided supportive visit to pt's wife, whom was at bedside. Writer used  for visit. Answered wife's questions around what to expect for pt's remainder of hospitalization.     Intervention: Supportive Visit    Assessment: Pt's wife emotional and so thankful for patient receiving a heart and the care pt is getting. She has no current concerns.   Education provided by SW: Ongoing Social Work support   Plan:    Discharge Plans in Progress: None-continue to assess needs post extubation.     Barriers to d/c plan: Medical Readiness     Follow up Plan: SW to continue to follow.

## 2020-07-22 NOTE — PLAN OF CARE
Problem: Adult Inpatient Plan of Care  Goal: Readiness for Transition of Care  Outcome: No Change   ICU End of Shift Summary. See flowsheets for vital signs and detailed assessment.    Changes this shift: Pt remains intubated, sedated on fent/prop. Hemodynamically stable. On epi/levo/iso. Last CVP 9, PAP 28/4. Nitric oxide at 5. Insulin gtt at 10, BG's from low to high 100's overnight. Adequate UOP. Tube feeds advanced to 35 per orders. No BM overnight, passing flatus per pt.  Plan: Plan to remove IABP and LA line. Wean nitric oxide. Extubate if able.

## 2020-07-22 NOTE — PROGRESS NOTES
[facility]  Transplant Infectious Disease Progress Note     Patient:  Lucian Henderson Sr., Date of birth 1955, Medical record number [id number]  Date of Visit:  07/22/2020  Consult requested by Dr. Massimo Griselli for evaluation of blood cultures growing Streptococcus salivarius         Assessment and Recommendations:   Recommendations:    1. Continue Ceftriaxone (2g Q24 Hrs) until 7/25    Transplant Infectious Disease sign off at this time.    Assessment:    Infectious Disease issues include:    - Donor Positive Culture Streptococcus salivarius  Graft donor blood culture reported positive with Streptococcus salivarius. While it is unclear if this is a contaminant versus an infection, TTE post operatively did not show any vegetations on the valves, patient has been hemodynamically stable given the caveat of having a heart transplant. CXR obtained has not shown evidence of a consolidation concerning for lung infection. A pronounced leukocytosis after surgical transplant and steroids while being afebrile is unlikely to be a sign of infection. Recommend the conservative approach and switch antibiotics from vancomycin/cefepime to ceftriaxone for a total of 7 days of antimicrobial therapy (ending on 7/25).    Fritz Mcdonnell MD  PGY-1, Internal Medicine  Pager: [phone]         History of Infectious Disease Illness:     Lucian Henderson Sr. Is a 64 year old male with T2DM, HTN, CKD, CHANTEL, ischemic cardiomyopathy w/severe LV dysfunction, s/p 3 V CABG (2008), ICD (2015), and s/p heart transplant on 7/20/2020. Blood cultures prior to transplantation in recipient were negative. Donor cultures tested positive for streptococcus salivarius.     No acute events overnight. He is still intubated at this time, attempting to extubate today. He is alert, able to respond to yes/no questions. Generally feeling well given the context of recent surgery and intubation.      Transplants:   7/19/2020 (Heart), Postoperative day:  3.  Coordinator Christelle Pettit    Review of Systems:  CONSTITUTIONAL:  No fevers or chills  EYES: Negative for icterus or acute vision changes  ENT: No changes in hearing  RESPIRATORY:  negative for cough, sputum or dyspnea  CARDIOVASCULAR:  negative for chest pain, palpitations  GASTROINTESTINAL:  negative for nausea, vomiting  GENITOURINARY:  negative for dysuria or hematuria  HEME:  No easy bruising or bleeding  INTEGUMENT:  negative for rash or pruritus  NEURO:  Negative for headache or tremor    Past Medical History:   Diagnosis Date     CAD (coronary artery disease)      CHF (congestive heart failure) (H)      CKD (chronic kidney disease), stage III (H)      Cortical cataract of both eyes      Diabetes (H)      Hyperlipidemia      Hypertension      Ischemic cardiomyopathy      Obesity      CHANTEL (obstructive sleep apnea)     occas cpap     Osteoarthritis        Past Surgical History:   Procedure Laterality Date     C CABG, ARTERY-VEIN, THREE  02/2008     CATARACT IOL, RT/LT       COLONOSCOPY N/A 8/7/2019    Procedure: COLONOSCOPY, WITH POLYPECTOMY AND BIOPSY;  Surgeon: Chauncey Morataya MD;  Location:  GI     CV RIGHT HEART CATH N/A 3/25/2019    Procedure: CV RIGHT HEART CATH;  Surgeon: Moises Santos MD;  Location:  HEART CARDIAC CATH LAB     CV RIGHT HEART CATH N/A 7/10/2019    Procedure: CV RIGHT HEART CATH;  Surgeon: Jak Mccabe MD;  Location:  HEART CARDIAC CATH LAB     CV RIGHT HEART CATH N/A 7/8/2020    Procedure: Right Heart Cath with Leave In [place] already has ICU load;  Surgeon: Jak Mccabe MD;  Location:  HEART CARDIAC CATH LAB     EXTRACTION(S) DENTAL Left 7/13/2020    Procedure: EXTRACTION, TOOTH #11, 12, 13, 15, and 29;  Surgeon: Monica Chao DDS;  Location:  OR     IMPLANT AUTOMATIC IMPLANTABLE CARDIOVERTER DEFIBRILLATOR       INSERT INTRAAORTIC BALLOON PUMP N/A 7/16/2020    Procedure: Subclavian Intra-Aortic Balloon  Pump Placement;  Surgeon: Allan Sparrow MD;  Location: [facility] OR     PHACOEMULSIFICATION CLEAR CORNEA WITH STANDARD IOL, VITRECTOMY PARSPLANA 25 GAGUE, COMBINED Left 12/10/2019    Procedure: PHACOEMULSIFICATION, CATARACT, CLEAR CORNEAL INCISION APPROACH, W STD INTRAOCULAR LENS IMPLANT INSERT + VITRECTOMY BY PARS PLANA  USING 25-GAUGE INSTRUMENTS. ENDOLASER, INFUSION OF 20% SF6 GAS;  Surgeon: Triny Bunch MD;  Location: [facility] OR     PICC INSERTION Right 07/11/2020    basilic 44 cm total      TRANSPLANT HEART RECIPIENT N/A 7/19/2020    Procedure: REDO MEDIAN STERNOTOMY, TRANSPLANT, ORTHOTOPIC HEART, RECIPIENT, ON PUMP OXYGENATOR, REMOVAL OF CARDIAC DEFIBRILLATOR AND LEAD;  Surgeon: Griselli, Massimo, MD;  Location: [facility] OR       Family History   Problem Relation Age of Onset     Diabetes Brother      Diabetes Sister      Diabetes Sister      Macular Degeneration No family hx of      Glaucoma No family hx of      Myocardial Infarction No family hx of      Kidney Disease No family hx of        Social History     Social History Narrative     Not on file     Social History     Tobacco Use     Smoking status: Never Smoker     Smokeless tobacco: Never Used     Tobacco comment: Never smoked; non-smoking household   Substance Use Topics     Alcohol use: No     Alcohol/week: 0.0 standard drinks     Drug use: No       Immunization History   Administered Date(s) Administered     Influenza (IIV3) PF 10/05/2011, 10/15/2012     Influenza Vaccine IM > 6 months Valent IIV4 10/27/2015, 09/14/2016, 10/05/2017, 10/03/2018     Pneumococcal 23 valent 08/04/2009, 04/03/2015     TDAP Vaccine (Adacel) 08/04/2009       Patient Active Problem List   Diagnosis     Coronary artery disease involving native coronary artery without angina pectoris     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)     Hyperlipidemia LDL goal <100     Hypertension goal BP (blood pressure) < 140/90     CHF (NYHA class II, ACC/AHA stage C) (H)     CKD  (chronic kidney disease) stage 3, GFR 30-59 ml/min (H)     Health Care Home     Automatic implantable cardioverter-defibrillator - [employer] single lead ICD, Not Dependent     Chronic systolic heart failure (H)     Proteinuria     Vitamin D deficiency     OA (osteoarthritis) of knee     Chondromalacia of patella, unspecified laterality     Pain in joint of left shoulder     Tinnitus     Ptosis of right eyelid     Secondary renal hyperparathyroidism (H)     Cortical cataract of both eyes     Moderate nonproliferative diabetic retinopathy, without macular edema, associated with type 2 diabetes mellitus     Obesity     CHANTEL (obstructive sleep apnea)- severe (AHI 30)     Systolic heart failure (H)     Heart transplant candidate     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, unspecified whether long term insulin use (H)     Nuclear cataract, nonsenile     Coronary artery disease involving native coronary artery of native heart without angina pectoris     Status post coronary angiogram     CHF (congestive heart failure) (H)     Acute on chronic systolic congestive heart failure (H)     Heart failure (H)     Dental caries     Heart transplant, orthotopic, status (H)            Current Medications & Allergies:       acetaminophen  975 mg Oral or Feeding Tube Q8H     cefTRIAXone  2 g Intravenous Q24H     insulin glargine  40 Units Subcutaneous BID     multivitamins w/minerals  15 mL Per Feeding Tube Daily     mycophenolate mofetil  1,500 mg Intravenous Q12H     nystatin  1,000,000 Units Oral 4x Daily     pantoprazole (PROTONIX) IV  40 mg Intravenous Daily     polyethylene glycol  17 g Oral or Feeding Tube Daily     predniSONE  40 mg Oral BID    Followed by     [START ON 7/23/2020] predniSONE  35 mg Oral BID    Followed by     [START ON 7/25/2020] predniSONE  30 mg Oral BID    Followed by     [START ON 7/27/2020] predniSONE  25 mg Oral BID    Followed by     [START ON  7/29/2020] predniSONE  20 mg Oral BID     senna-docusate  1 tablet Oral BID    Or     senna-docusate  2 tablet Oral BID     sodium chloride (PF)  3 mL Intracatheter Q8H     vancomycin (VANCOCIN) IV  1,500 mg Intravenous Q18H       Infusions/Drips:      dextrose       dextrose       EPINEPHrine IV infusion ADULT 0.03 mcg/kg/min (07/22/20 0700)     fentaNYL 75 mcg/hr (07/22/20 0700)     insulin (regular) 10 Units/hr (07/22/20 0721)     isoproterenol (ISUPREL) infusion ADULT 0.01 mcg/kg/min (07/22/20 0700)     - MEDICATION INSTRUCTIONS -       norepinephrine 0.03 mcg/kg/min (07/22/20 0700)     propofol (DIPRIVAN) infusion 10 mcg/kg/min (07/22/20 0700)     BETA BLOCKER NOT PRESCRIBED         Allergies   Allergen Reactions     Heparin Heparin Induced Thrombocytopenia     HIT screen sent 7/18/2020. No heparin products until resulted     No Known Drug Allergies             Physical Exam:     Patient Vitals for the past 24 hrs:   Temp Temp src Pulse Resp SpO2 Weight   07/22/20 0700 -- -- -- 17 -- --   07/22/20 0645 -- -- -- -- 98 % --   07/22/20 0630 -- -- -- -- 99 % --   07/22/20 0615 -- -- -- -- 99 % --   07/22/20 0600 -- -- -- 12 98 % --   07/22/20 0545 -- -- -- -- 98 % --   07/22/20 0530 -- -- -- -- 98 % --   07/22/20 0515 -- -- -- -- 99 % --   07/22/20 0500 -- -- -- 14 -- 84.2 kg (185 lb 10 oz)   07/22/20 0445 -- -- -- -- 96 % --   07/22/20 0430 -- -- -- -- 98 % --   07/22/20 0415 -- -- -- -- 98 % --   07/22/20 0400 97.3  F (36.3  C) Pulm Art 115 12 98 % --   07/22/20 0345 -- -- -- -- 98 % --   07/22/20 0330 -- -- -- -- 98 % --   07/22/20 0315 -- -- -- -- 98 % --   07/22/20 0300 -- -- -- 12 98 % --   07/22/20 0245 -- -- -- -- 98 % --   07/22/20 0230 -- -- -- -- 98 % --   07/22/20 0215 -- -- -- -- 98 % --   07/22/20 0200 -- -- -- 12 98 % --   07/22/20 0145 -- -- -- -- 98 % --   07/22/20 0130 -- -- -- -- 99 % --   07/22/20 0115 -- -- -- -- 99 % --   07/22/20 0100 -- -- -- 12 99 % --   07/22/20 0045 -- -- -- -- 100 % --    07/22/20 0030 -- -- -- -- 99 % --   07/22/20 0015 -- -- -- -- 99 % --   07/22/20 0000 96.6  F (35.9  C) (P) Pulm Art 115 12 99 % --   07/21/20 2345 -- -- -- -- 99 % --   07/21/20 2330 -- -- -- -- 100 % --   07/21/20 2315 -- -- -- -- 99 % --   07/21/20 2300 -- -- -- 12 100 % --   07/21/20 2245 -- -- -- -- 100 % --   07/21/20 2230 -- -- -- -- 99 % --   07/21/20 2215 -- -- -- -- 100 % --   07/21/20 2200 -- -- -- 12 99 % --   07/21/20 2145 -- -- -- -- 100 % --   07/21/20 2130 -- -- -- -- 99 % --   07/21/20 2115 -- -- -- -- 99 % --   07/21/20 2100 -- -- -- 12 99 % --   07/21/20 2045 -- -- -- -- 99 % --   07/21/20 2030 -- -- -- -- 99 % --   07/21/20 2015 -- -- -- -- 100 % --   07/21/20 2000 98.2  F (36.8  C) Axillary 115 12 99 % --   07/21/20 1915 -- -- -- -- 99 % --   07/21/20 1900 97.9  F (36.6  C) -- -- 12 99 % --   07/21/20 1845 -- -- -- -- 99 % --   07/21/20 1830 -- -- -- -- 100 % --   07/21/20 1815 -- -- -- -- 99 % --   07/21/20 1800 98.1  F (36.7  C) -- -- 12 99 % --   07/21/20 1745 -- -- -- -- 100 % --   07/21/20 1730 -- -- -- -- 99 % --   07/21/20 1715 -- -- -- -- 100 % --   07/21/20 1700 98.2  F (36.8  C) -- -- 12 100 % --   07/21/20 1645 -- -- -- -- 99 % --   07/21/20 1630 -- -- -- -- 100 % --   07/21/20 1615 -- -- -- -- 100 % --   07/21/20 1600 97.9  F (36.6  C) Pulm Art -- 12 100 % --   07/21/20 1545 -- -- -- -- 100 % --   07/21/20 1530 -- -- -- -- 99 % --   07/21/20 1515 -- -- -- -- 100 % --   07/21/20 1500 98.2  F (36.8  C) -- -- 12 99 % --   07/21/20 1445 -- -- -- -- 100 % --   07/21/20 1430 -- -- -- -- 100 % --   07/21/20 1415 -- -- -- -- 100 % --   07/21/20 1400 98.1  F (36.7  C) -- -- 12 100 % --   07/21/20 1345 -- -- -- -- 100 % --   07/21/20 1330 -- -- -- -- 100 % --   07/21/20 1315 -- -- -- -- 100 % --   07/21/20 1300 98.2  F (36.8  C) -- -- 14 100 % --   07/21/20 1245 -- -- -- -- 100 % --   07/21/20 1230 -- -- -- -- 100 % --   07/21/20 1215 -- -- -- -- 100 % --   07/21/20 1200 98.2  F (36.8  C)  Pulm Art -- 12 100 % --   07/21/20 1145 -- -- -- -- 99 % --   07/21/20 1130 -- -- -- -- 100 % --   07/21/20 1115 -- -- -- -- 100 % --   07/21/20 1100 98.4  F (36.9  C) -- -- 12 100 % --   07/21/20 1045 -- -- -- -- 100 % --   07/21/20 1030 -- -- -- -- 100 % --   07/21/20 1015 -- -- -- -- 99 % --   07/21/20 1000 98.8  F (37.1  C) -- -- 15 100 % --   07/21/20 0945 -- -- -- -- 100 % --   07/21/20 0930 -- -- -- -- 100 % --   07/21/20 0915 -- -- -- -- 100 % --   07/21/20 0900 99.3  F (37.4  C) -- -- 12 100 % --   07/21/20 0845 -- -- -- -- 100 % --     Ranges for vital signs:  Temp:  [96.6  F (35.9  C)-99.3  F (37.4  C)] 97.3  F (36.3  C)  Pulse:  [115] 115  Heart Rate:  [115] 115  Resp:  [12-17] 17  MAP:  [67 mmHg-98 mmHg] 82 mmHg  Arterial Line BP: ()/(49-77) 102/58  FiO2 (%):  [40 %] 40 %  SpO2:  [96 %-100 %] 98 %  Vitals:    07/19/20 0800 07/21/20 0400 07/22/20 0500   Weight: 75.4 kg (166 lb 3.2 oz) 82.2 kg (181 lb 3.5 oz) 84.2 kg (185 lb 10 oz)       Physical Examination:  GENERAL:  well-developed, well-nourished, in bed in no acute distress. Alert, but intubated, unable to speak.  HEAD:  Head is normocephalic, atraumatic   EYES:  Eyes have anicteric sclerae without conjunctival injection   ENT:  Oropharynx is moist. Tongue is midline  NECK:  Supple. No cervical lymphadenopathy  LUNGS:  Clear to auscultation bilateral.   CARDIOVASCULAR:  Regular rate and rhythm with no murmurs, gallops or rubs.  ABDOMEN:  Normal bowel sounds, soft, nontender. No appreciable hepatosplenomegaly.  SKIN:  No acute rashes.  Line in place without any surrounding erythema or exudate. Surgical insicions healing well, bandages without visible drainage.  NEUROLOGIC:  Grossly nonfocal. Active x4 extremities.         Laboratory Data:     No results found for: ACD4    Inflammatory Markers    Recent Labs   Lab Test 07/10/20  0332 07/08/19  1021   CRP  --  9.9   PSA 0.12 0.75       Immune Globulin Studies     Recent Labs   Lab Test  04/09/15  0721   IGG 1,310   IGM 38*          Metabolic Studies       Recent Labs   Lab Test 07/22/20  0423 07/22/20  0414 07/21/20  2329 07/21/20  2153  07/21/20  1540  07/03/20  1559   NA  --  142  --  142  --  141   < > 133   POTASSIUM  --  4.6  --  4.4  --  4.6   < > 4.4   CHLORIDE  --  110*  --  112*  --  110*   < > 102   CO2  --  27  --  27  --  28   < > 25   ANIONGAP  --  5  --  3  --  2*   < > 6   BUN  --  38*  --  36*  --  34*   < > 33*   CR  --  1.54*  --  1.47*  --  1.62*   < > 1.65*   GFRESTIMATED  --  46*  --  49*  --  43*   < > 42*   GLC  --  121*  --  160*  --  117*   < > 229*   A1C  --   --   --   --   --   --   --  8.2*   BRYANNA  --  8.4*  --  7.8*  --  8.6   < > 8.7   PHOS  --   --   --   --   --  4.7*   < >  --    MAG  --  2.3  --   --   --  2.5*   < > 2.2   LACT 1.1  --  0.7  --    < > 0.8   < >  --     < > = values in this interval not displayed.       Hepatic Studies    Recent Labs   Lab Test 07/22/20  0414 07/21/20  0422 07/20/20  0232  07/08/19  1021   BILITOTAL 0.4 1.0 1.8*   < > 0.6   DBIL 0.2 0.4*  --    < >  --    ALKPHOS 75 67 69   < > 126   PROTTOTAL 5.5* 5.7* 5.5*   < > 7.5   ALBUMIN 2.4* 2.9* 2.8*   < > 3.4   AST 28 72* 108*   < > 14   ALT 25 34 39   < > 20   LDH  --   --   --   --  174    < > = values in this interval not displayed.       Pancreatitis testing    Recent Labs   Lab Test 07/19/20  1613 07/08/19  1021   AMYLASE 36  --    TRIG  --  181*       Gout Labs      Recent Labs   Lab Test 07/08/19  1021 06/26/15  1606 04/06/15  1555   URIC 10.0* 5.7 6.5       Hematology Studies      Recent Labs   Lab Test 07/22/20  0414 07/21/20  2153 07/21/20  1540 07/21/20  0422 07/20/20  2302 07/20/20  1610  07/19/20  1613  07/08/19  1021   WBC 24.8* 25.4* 28.2* 24.6* 23.2* 17.7*   < > 7.7   < > 7.7   ANEU  --   --   --   --   --   --   --  6.0  --  4.9   ALYM  --   --   --   --   --   --   --  0.6*  --  1.7   ELAN  --   --   --   --   --   --   --  0.8  --  0.8   AEOS  --   --   --   --    --   --   --  0.3  --  0.3   HGB 8.2* 8.2* 9.1* 9.4* 9.4* 9.9*   < > 10.8*   < > 13.4   HCT 25.3* 25.3* 27.4* 28.3* 28.3* 29.2*   < > 34.0*   < > 42.5   * 128* 140* 141* 140* 145*   < > 99*   < > 235    < > = values in this interval not displayed.       Clotting Studies    Recent Labs   Lab Test 07/20/20  1610 07/20/20  0506 07/20/20  0232 07/20/20  0018   INR 1.35* 1.35* 1.52* 1.59*   PTT  --  37 36 30       Iron Testing    Recent Labs   Lab Test 07/22/20  0414  07/08/19  1021  03/06/15  1125   IRON  --   --  49  --  51   FEB  --   --  247  --  277   IRONSAT  --   --  20  --  18   SEVERIANO  --   --  156  --  392*   MCV 94   < > 96   < > 93   B12  --   --  258  --   --     < > = values in this interval not displayed.       Markers  No lab results found.    Invalid input(s): FETOPROTEIN, SERUM, AFP    Autoimmune Testing  No lab results found.    Invalid input(s): MPO, PRO3, C3, C4, VASPAN    Arterial Blood Gas Testing    Recent Labs   Lab Test 07/22/20  0423 07/21/20  2030 07/21/20  1824 07/21/20  1540  07/21/20  0420  07/20/20  1610   PH 7.40  --  7.42 7.42  --  7.45  --  7.41   PCO2 45  --  42 35  --  39  --  44   PO2 104  --  125* 128*  --  134*  --  141*   HCO3 28  --  27 23  --  27  --  28   O2PER 40.0  40.0 40 40 40  40   < > 40   < > 40  40    < > = values in this interval not displayed.        Thyroid Studies     Recent Labs   Lab Test 07/07/20  0720 07/04/20  0517 07/08/19  1021 01/05/17  0741 01/23/15  1723   TSH 1.30 1.25 1.30 1.44 1.16       Urine Studies     Recent Labs   Lab Test 07/19/20  1930 07/17/20  1402 07/08/19  1017 07/26/16  1006 06/26/15  1640   URINEPH 6.0 5.0 6.0 6.0 5.5   NITRITE Negative Negative Negative Negative Negative   LEUKEST Negative Large* Large* Negative Negative   WBCU <1 13* >182* O - 2 0   UWBCLM  --   --  Present*  --   --        Medication levels  No lab results found.    Invalid input(s): AMIK    CSF testing   No lab results found.    Invalid input(s): CADAM, EVPCR,  ENTPCR, ENTEROVIRUS    Body fluid stats  No lab results found.    Microbiology:  Fungal testing  No lab results found.    Invalid input(s): HIFUN, FUNGL    Last Culture results with specimen source  Culture Micro   Date Value Ref Range Status   07/21/2020 No growth after 17 hours  Preliminary   07/17/2020 No growth after 5 days  Preliminary   07/17/2020 No growth  Final   07/17/2020 Culture negative monitoring continues  Preliminary   07/17/2020 No growth  Final   11/05/2009 No Beta Streptococcus isolated  Final    Specimen Description   Date Value Ref Range Status   07/21/2020 Blood Left Hand  Final   07/17/2020 Blood Left Arm  Final   07/17/2020 Catheter tip  Final   07/17/2020 Catheter tip  Final   07/17/2020 Midstream Urine  Final   11/05/2009 Throat  Final   11/05/2009 Throat  Final        Last check of C difficile  No results found for: CDBPCT    Syphilis Testing    Treponema Antibodies   Date Value Ref Range Status   07/08/2019 Nonreactive NR^Nonreactive Final       Quantiferon testing   Recent Labs   Lab Test 07/19/20  1613 07/08/19  1021   LYMPH 7.9 22.1   TBRES  --  Negative       Virology:  Respiratory virus testing    Recent Labs   Lab Test 07/07/20  1456   [id number] Nasopharyngeal   SARSCOVRES NEGATIVE       CMV viral loads  No lab results found.    No results found for: CMVLOG    No results found for: H6RES    No results found for: EBRES    No results found for: 56024, BKRES    Parvovirus Testing  No lab results found.    Invalid input(s): PRVRES    Adenovirus Testing  No lab results found.    Invalid input(s): ADENAB, ADAG, ADENOVIRUS, ADQT    Hepatitis B Testing     Recent Labs   Lab Test 07/08/19  1021   AUSAB 1.02   HBCAB Nonreactive   HEPBANG Nonreactive        Hepatitis C Antibody   Date Value Ref Range Status   07/08/2019 Nonreactive NR^Nonreactive Final     Comment:     Assay performance characteristics have not been established for newborns,   infants, and children     07/06/2016  NR Final     Nonreactive   Assay performance characteristics have not been established for newborns,   infants, and children         CMV Antibody IgG   Date Value Ref Range Status   07/19/2020 >8.0 (H) 0.0 - 0.8 AI Final     Comment:     Positive  Antibody index (AI) values reflect qualitative changes in antibody   concentration that cannot be directly associated with clinical condition or   disease state.     07/08/2019 >8.0 (H) 0.0 - 0.8 AI Final     Comment:     Positive  Antibody index (AI) values reflect qualitative changes in antibody   concentration that cannot be directly associated with clinical condition or   disease state.       Varicella Zoster Virus Antibody IgG   Date Value Ref Range Status   07/08/2019 7.6 (H) 0.0 - 0.8 AI Final     Comment:     Positive, suggests prev. exposure and probable immunity  Antibody index (AI) values reflect qualitative changes in antibody   concentration that cannot be directly associated with clinical condition or   disease state.       EBV Capsid Antibody IgG   Date Value Ref Range Status   07/19/2020 3.2 (H) 0.0 - 0.8 AI Final     Comment:     Positive, suggests recent or past exposure  Antibody index (AI) values reflect qualitative changes in antibody   concentration that cannot be directly associated with clinical condition or   disease state.     07/08/2019 2.4 (H) 0.0 - 0.8 AI Final     Comment:     Positive, suggests recent or past exposure  Antibody index (AI) values reflect qualitative changes in antibody   concentration that cannot be directly associated with clinical condition or   disease state.       Toxoplasma Antibody IgG   Date Value Ref Range Status   07/08/2019 <3.0 0.0 - 7.1 IU/mL Final     Comment:     Negative- Absence of detectable Toxoplasma gondii IgG antibodies. A negative   result does not rule out acute infection.  The magnitude of the measured result is not indicative of the amount of   antibody present. The concentrations of anti-Toxoplasma gondii IgG  in a given   specimen determined with assays from different manufacturers can vary due to   differences in assay methods and reagent specificity.       Herpes Simplex Virus Type 1 IgG   Date Value Ref Range Status   2019 >8.0 (H) 0.0 - 0.8 AI Final     Comment:     Positive.  IgG antibody to HSV-1 detected.  Antibody index (AI) values reflect qualitative changes in antibody   concentration that cannot be directly associated with clinical condition or   disease state.       Herpes Simplex Virus Type 2 IgG   Date Value Ref Range Status   2019 <0.2 0.0 - 0.8 AI Final     Comment:     No HSV-2 IgG antibodies detected.  Antibody index (AI) values reflect qualitative changes in antibody   concentration that cannot be directly associated with clinical condition or   disease state.         Imaging:  Recent Results (from the past 48 hour(s))   Echo Limited    Narrative    [id number]  [id number]  [id number]  [id number]^OTF^KAYLI^WINSOME           [facility],[place]  Echocardiography Laboratory  [address] [place] [id number]     Name: BCUK CABAN  MRN: [id number]  : 1955  Study Date: 2020 08:28 AM  Age: 64 yrs  Gender: Male  Patient Location: [facility]  Reason For Study: Transplant - Heart (POD 1)  Ordering Physician: KAYLI VANESSA  Referring Physician: SHONDA MERCHANT  Performed By: Yfn Unger RDCS     BSA: 1.8 m2  Height: 64 in  Weight: 166 lb  HR: 115  _____________________________________________________________________________  __        Procedure  Limited Portable Echo Adult. Technically difficult study.Extremely poor  acoustic windows. Limited information was obtained during study.  _____________________________________________________________________________  __        Interpretation Summary  Technically difficult study.Extremely poor acoustic windows.  Limited information was obtained during study.  Left ventricular size is normal.  The  Ejection Fraction is estimated at 20-25%.  Global right ventricular function is severely reduced.  Dilation of the inferior vena cava is present with abnormal respiratory  variation in diameter.  No pericardial effusion is present.  _____________________________________________________________________________  __        Left Ventricle  Left ventricular size is normal. Borderline concentric wall thickening  consistent with left ventricular hypertrophy is present. The Ejection Fraction  is estimated at 20-25%. Severe diffuse hypokinesis is present.     Right Ventricle  Global right ventricular function is severely reduced.     Vessels  Dilation of the inferior vena cava is present with abnormal respiratory  variation in diameter.     Pericardium  No pericardial effusion is present.     _____________________________________________________________________________  __  MMode/2D Measurements & Calculations  IVSd: 1.2 cm  LVIDd: 4.8 cm  LVIDs: 4.3 cm  LVPWd: 1.1 cm     FS: 9.5 %  LV mass(C)d: 201.9 grams  LV mass(C)dI: 111.7 grams/m2  RWT: 0.47           _____________________________________________________________________________  __           Report approved by: Ricardo Herzog 07/20/2020 09:21 AM      XR Abdomen Port 1 View    Narrative    Exam: XR ABDOMEN PORT 1 VW, 7/20/2020 3:06 PM    Indication: [place], patient has cardiac transplant.    Comparison: CT: 7/19/2019    Findings:   Single frontal radiograph of the abdomen. Partially visualized median  sternotomy wires. There are 2 enteric tubes. Gastric tube projects  over the proximal stomach, with sidehole past the GE junction. Second  larger bore feeding tube tip projects over the distal stomach in the  region of the pylorus. Right-sided inferiorly directed chest tube is  in appropriate position. There are 2 additional drains that are  partially visualized. Nonobstructive bowel gas pattern. No acute  osseous abnormalities. Postsurgical changes in the thorax. Please  see  separate same-day chest radiograph.       Impression    Impression:   Gastric tube tip and sidehole of the proximal stomach. Feeding tube  tip present towards the distal stomach in the region of the pylorus.    I have personally reviewed the examination and initial interpretation  and I agree with the findings.    TASIA PERKINS MD   XR Chest Port 1 View    Narrative    XR CHEST PORT 1   7/20/2020 3:07 PM      HISTORY: PA cath adjustment    COMPARISON: Chest x-ray same day.    FINDINGS:   Postsurgical changes of the chest. Sternotomy wires are intact.  Interval advancement of intra-aortic balloon pump with tip now  projecting at the level of the aortic arch. Right [place] catheter  tip terminates over the main pulmonary artery, unchanged from prior  exam. Pericardial and/or mediastinal drains are unchanged. Right chest  tube in stable position. Endotracheal tube in stable position with tip  projecting over the mid trachea. Enteric tube tip courses below the  margin of the film. Right upper extremity central catheter tip  projects over the mid SVC.    Cardiomediastinal silhouette is within normal limits. Stable perihilar  and mediastinal opacities. No pneumothorax. Stable, mild  pneumomediastinum. Retrocardiac opacities.                                                                        Impression    IMPRESSION:   1. Postsurgical heart transplant changes with interval advancement of  intra-aortic balloon pump with tip now projecting at the level of the  aortic arch. Additional lines and tubes stable as above.    2. Stable retrocardiac opacities, favored to represent atelectasis.    3. Unchanged small right pleural effusion.    I have personally reviewed the examination and initial interpretation  and I agree with the findings.    KANDACE MENDOZA MD   XR Chest Port 1 View    Narrative    Exam: XR CHEST PORT 1 VW, 7/21/2020 6:55 AM    Indication: interval post op chest XR    Comparison:  7/20/2020    Findings: Single portable chest radiograph. Endotracheal tube in the  mid thoracic trachea. Right [place] catheter terminating over the  proximal right pulmonary artery. Right PICC, intra-aortic balloon  pump, and enteric tube are in stable position. Right chest tube,  mediastinal, and pericardial drains are in stable position. Vascular  catheter projects over the right axilla. Median sternotomy wires.  Trachea is midline. Heart size is within normal limits. Mild perihilar  fullness. Bilateral and left retrocardiac opacities.      Impression    Impression:   1. Support devices in stable position.  2. Mild bibasilar opacities, likely atelectasis, and trace bilateral  pleural effusions.    I have personally reviewed the examination and initial interpretation  and I agree with the findings.    HERMELINDO RIVAS MD   XR Chest Port 1 View    Narrative    Exam: XR CHEST PORT 1 VW, 7/22/2020 1:10 AM    Indication: Interval post op CXR    Comparison: 7/21/2020     Findings: Single portable chest radiograph. Endotracheal tube in the  mid thoracic trachea approximately 4.4 cm above the inna. Right  [place] catheter in stable position terminating over the proximal  right pulmonary artery. Right PICC, intra-aortic balloon pump, and  enteric tube are in stable position. Right chest tube, mediastinal,  and pericardial drains are in stable position. Vascular catheter  projects over the right axilla. Median sternotomy wires. Trachea is  midline. Heart size is within normal limits. Question mild residual  pneumomediastinum.  Mild perihilar fullness. Trace pleural effusions  with bibasilar and left retrocardiac opacities.      Impression    Impression:   1. Support devices in stable position.  2. Mild bibasilar opacities, likely atelectasis, and trace bilateral  pleural effusions.    I have personally reviewed the examination and initial interpretation  and I agree with the findings.    CHANELLE RODRÍGUEZ MD

## 2020-07-22 NOTE — PLAN OF CARE
OT/CR-4E: Cancel. Pt not yet medically appropriate for initiation of therapy. Will reschedule and initiated when appropriate.

## 2020-07-22 NOTE — PROGRESS NOTES
BRIEF HEMATOLOGY PROGRESS NOTE    Lucian Henderson Sr. is a 64 year old male who underwent OHT on 7/19. 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    B/L UE Doppler 7/22/20 demonstrated occlusive thrombus in the superficial cephalic vein and nonocclusive thrombus in the RIJ along the intravenous catheter as well as R axillary vein along the R PICC line.     - Recommend starting anticoagulation with direct thrombin inhibitor (DTI) agents argatroban or bivalirudin if/when safe from a surgical stand point.   - Given concurrent thrombosis with HIT as in this case, we should continue anticoagulation for 3-6 months. For this, we can transition to coumadin beginning recovery of platelet counts while continuing DTI for a minimum of 5 days and until the INR has reached 2.0-3.0.      We will continue to follow. Above was discussed with Dr. Garza.     Ty Aguirre   Hematology/Oncology Fellow  Pager: [phone]  Attending  The patient was seen and examined by me separate from the resident/fellow provider.The note above reflects my assessment and plan. I have personally reviewed today's labs,vital and radiology results. The points of care that were added by me are:    CORRINE positive. Worry about superficial phlebitis. Agree is hemostatically stable from surgery consider direct thrombin inhibitor  William Garza M.D.  [phone]

## 2020-07-22 NOTE — PROGRESS NOTES
RT Note:      Patient extubated to HFNC 20LPM 40% with 2 PPM N.O. in line per MD order. Patient tolerated well, will continue to monitor.    Margo Cordoba, RT

## 2020-07-22 NOTE — PROGRESS NOTES
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 is a 64 year old male with PMH T2DM, HTN, CKD, CHANTEL, ICM with severe LV dysfunction, s/p 3V CABG (2008), ICD (2015), S/p Heart Transplant on 7/20 with Dr. Griselli.     TODAY'S PROGRESS:   - Wean Lalito off  - Wean NE  - PST and extubate once Lalito is off, and then switch to Lalito via HFNC/Mask  - IABP removed  - Increase Lantus 40 mg BID  - Bilateral UE/LE US   - Patient is HIT +, and CORRINE +  - Hematology following     PLAN:  Neuro/ pain/ sedation:  #Acute post operative pain  - Monitor neurological status. Notify the MD for any acute changes in exam  - Fentanyl gtt for pain, tylenol, gabapentin, oxy/dilaudid/robaxin PRN  - Propofol gtt for sedation     Pulmonary care:   #CHANTEL  - CMV/AC 12/480/6/40  - Wean Lalito off   - PST and extubate once Lalito is off, and then switch to Lalito via HFNC/Mask  - Titrate FiO2 for SpO2 >92%     Cardiovascular:    #ICM with sever LV dysfunction s/p heart transplant 7/20  #S/p 3V CABG (2008)  #VT/VF arrhythmia s/p ICD (2015)  #Atrial flutter with RVR  #HTN/HLD  #IABP (subclavian) removed 7/22  - Monitor hemodynamic status  - MAP >65  - Pressers Epi 0.03, NE 0.03, wean NE first   - Isoproterenol 0.01  - Hold PTA aspirin 81 mg, atorvastatin 40 mg, plavix 75 mg, lasix 20 mg BID  - Hold PTA hydralazine 25 mg TID, losartan 50 mg     GI/Nutrition:   #Lactic Acidosis  - NPO except ice chips and medications  - Increase  TFs to goal   - Increase bowel regimen as patient has not had a BM      Fluids/ Electrolytes/Renal:   #CKD, baseline Cr 1.6  #Hypokalemia, resolved   - TKO for IV fluid hydration  - Urine output is adequate so far  - Will continue to monitor intake and output.  - Cr 1.54  - Replete electrolytes PRN      Endocrine:    #T2DM  # Stress hyperglycemia  - Insulin gtt  - Increase lantus to 40 mg BID   - PTA exenatide 2mg Q7days, glimepiride 4 mg, Lantus 8 units in the morning and 40 units in the evening.     ID/ Antibiotics:  - Afebrile, WBC 24 (25)  - Donor BCX growing: Strep salivarius, likely contaminate   - Transplant ID signed off 7/22  - Completed Vancomycin (3 day course)  - Will complete 7 days of ceftriaxone which will end on 7/24     Immunosuppression:  -Cellcept, prednisone     Heme:     #Thrombocytopenin  #HIT positive, CORRINE positive  - Hgb goal >7  - Hematology following   - B/L UE/LE US to r/o DVT      Prophylaxis:    - Mechanical prophylaxis for DVT  - No chemical DVT prophylaxis due to high risk of bleeding  - Protonix qd     Lines/ tubes/ drains:  - MAC (7/19), [place] (7/19), Arterial line (7/19), PICC (7/11), PIV (7/20), [place] (7/19), CT     Disposition:  - CV ICU     Patient seen, findings and plan discussed with CV ICU staff, Dr. Alisia Vega MD (CA2)  Anesthesiology Resident  *[id number]    ====================================    SUBJECTIVE:   No acute overnight events. Patient remains on Lalito and pressers. Doing well this morning.     OBJECTIVE:   1. VITAL SIGNS:   Temp:  [96.6  F (35.9  C)-99.3  F (37.4  C)] 97.3  F (36.3  C)  Pulse:  [115] 115  Heart Rate:  [115] 115  Resp:  [12-15] 14  MAP:  [67 mmHg-98 mmHg] 75 mmHg  Arterial Line BP: ()/(49-77) 91/56  FiO2 (%):  [40 %] 40 %  SpO2:  [96 %-100 %] 98 %  Ventilation Mode: CMV/AC  (Continuous Mandatory Ventilation/ Assist Control)  FiO2 (%): 40 %  Rate Set (breaths/minute): 12 breaths/min  Tidal Volume Set (mL): 480 mL  PEEP (cm H2O): 6  cmH2O  Oxygen Concentration (%): 40 %  Resp: 14      2. INTAKE/ OUTPUT:   I/O last 3 completed shifts:  In: 3357.88 [I.V.:2657.88; NG/GT:390]  Out: 2145 [Urine:1655; Emesis/NG output:200; Chest Tube:290]    3. PHYSICAL EXAMINATION:   General: Intubated   Neuro: Sedated, follows commands  Resp: Breathing non-labored on mechanical ventilation  CV:   Abdomen: Soft, Non-distended, Non-tender  Extremities: warm and well perfused    4. INVESTIGATIONS:   Arterial Blood Gases   Recent Labs   Lab 07/22/20  0423 07/21/20  1824 07/21/20  1540 07/21/20  0420   PH 7.40 7.42 7.42 7.45   PCO2 45 42 35 39   PO2 104 125* 128* 134*   HCO3 28 27 23 27     Complete Blood Count   Recent Labs   Lab 07/22/20  0414 07/21/20  2153 07/21/20  1540 07/21/20  0422   WBC 24.8* 25.4* 28.2* 24.6*   HGB 8.2* 8.2* 9.1* 9.4*   * 128* 140* 141*     Basic Metabolic Panel  Recent Labs   Lab 07/22/20  0414 07/21/20  2153 07/21/20  1540 07/21/20  0856    142 141 141   POTASSIUM 4.6 4.4 4.6 4.6   CHLORIDE 110* 112* 110* 112*   CO2 27 27 28 24   BUN 38* 36* 34* 32*   CR 1.54* 1.47* 1.62* 1.48*   * 160* 117* 195*     Liver Function Tests  Recent Labs   Lab 07/21/20  0422 07/20/20  1610 07/20/20  0506 07/20/20  0232 07/20/20  0018  07/19/20  1613   AST 72*  --   --  108*  --   --  24   ALT 34  --   --  39  --   --  21   ALKPHOS 67  --   --  69  --   --  113   BILITOTAL 1.0  --   --  1.8*  --   --  0.6   ALBUMIN 2.9*  --   --  2.8*  --   --  2.6*   INR  --  1.35* 1.35* 1.52* 1.59*   < > 1.18*    < > = values in this interval not displayed.     Pancreatic Enzymes  Recent Labs   Lab 07/19/20  1613   AMYLASE 36     Coagulation Profile  Recent Labs   Lab 07/20/20  1610 07/20/20  0506 07/20/20  0232 07/20/20  0018 07/19/20  2032   INR 1.35* 1.35* 1.52* 1.59* 1.23*   PTT  --  37 36 30 31         5. RADIOLOGY:   Recent Results (from the past 24 hour(s))   XR Chest Port 1 View    Narrative    Exam: XR CHEST PORT 1 VW, 7/21/2020 6:55  AM    Indication: interval post op chest XR    Comparison: 7/20/2020    Findings: Single portable chest radiograph. Endotracheal tube in the  mid thoracic trachea. Right [place] catheter terminating over the  proximal right pulmonary artery. Right PICC, intra-aortic balloon  pump, and enteric tube are in stable position. Right chest tube,  mediastinal, and pericardial drains are in stable position. Vascular  catheter projects over the right axilla. Median sternotomy wires.  Trachea is midline. Heart size is within normal limits. Mild perihilar  fullness. Bilateral and left retrocardiac opacities.      Impression    Impression:   1. Support devices in stable position.  2. Mild bibasilar opacities, likely atelectasis, and trace bilateral  pleural effusions.    I have personally reviewed the examination and initial interpretation  and I agree with the findings.    HERMELINDO RIVAS MD       =========================================

## 2020-07-22 NOTE — PROGRESS NOTES
D: POD #3 OHTx. Neuro unchanged, off propofol and fentanyl gtt's, extubated today at 1655 following a successful weaning trial of N.O. Now on inhaled N.O. at 2 ppm via HFNC o/n per MD order. Hemodynamically stable, on epi at 0.02 and NE at 0.02. A paced 100% at 115, isuprel at 0.01. SC IABP and LA vent removed per CVTS fellow. UOP ok.  min. U/S w/ RUE DVT requiring angiomax gtt (pt w/ hx of HIT positive). OG and feeding tube removed w/ extubation, Intensivist at bedside and aware. Continue to monitor BG o/n. Per chart, pt w/ constipation, oral senna/miralax and rectal ducolax given, smear BM noted.  I: As above. Hygiene care provided. Spouse updated re: status. MD updated re: status/labs.  A: No acute event this shift.  P: Continue to monitor. Notify MD of changes/concerns. Monitor s/s of bleeding on inhaled N.O. and bivalirudin gtt. Encourage pulm toilet and rehab as sammie.

## 2020-07-23 ENCOUNTER — APPOINTMENT (OUTPATIENT)
Dept: INTERPRETER SERVICES | Facility: CLINIC | Age: 65
End: 2020-07-23
Payer: COMMERCIAL

## 2020-07-23 ENCOUNTER — APPOINTMENT (OUTPATIENT)
Dept: OCCUPATIONAL THERAPY | Facility: CLINIC | Age: 65
DRG: 001 | End: 2020-07-23
Attending: SURGERY
Payer: COMMERCIAL

## 2020-07-23 ENCOUNTER — APPOINTMENT (OUTPATIENT)
Dept: GENERAL RADIOLOGY | Facility: CLINIC | Age: 65
DRG: 001 | End: 2020-07-23
Attending: INTERNAL MEDICINE
Payer: COMMERCIAL

## 2020-07-23 ENCOUNTER — APPOINTMENT (OUTPATIENT)
Dept: PHYSICAL THERAPY | Facility: CLINIC | Age: 65
DRG: 001 | End: 2020-07-23
Attending: INTERNAL MEDICINE
Payer: COMMERCIAL

## 2020-07-23 LAB
ANION GAP SERPL CALCULATED.3IONS-SCNC: 3 MMOL/L (ref 3–14)
ANION GAP SERPL CALCULATED.3IONS-SCNC: 4 MMOL/L (ref 3–14)
ANION GAP SERPL CALCULATED.3IONS-SCNC: 4 MMOL/L (ref 3–14)
ANION GAP SERPL CALCULATED.3IONS-SCNC: 6 MMOL/L (ref 3–14)
APTT PPP: 55 SEC (ref 22–37)
APTT PPP: 64 SEC (ref 22–37)
BACTERIA SPEC CULT: NO GROWTH
BASE EXCESS BLDA CALC-SCNC: 3 MMOL/L
BASE EXCESS BLDV CALC-SCNC: 0.3 MMOL/L
BASE EXCESS BLDV CALC-SCNC: 0.6 MMOL/L
BUN SERPL-MCNC: 39 MG/DL (ref 7–30)
BUN SERPL-MCNC: 41 MG/DL (ref 7–30)
CALCIUM SERPL-MCNC: 7.6 MG/DL (ref 8.5–10.1)
CALCIUM SERPL-MCNC: 7.6 MG/DL (ref 8.5–10.1)
CALCIUM SERPL-MCNC: 8.1 MG/DL (ref 8.5–10.1)
CALCIUM SERPL-MCNC: 8.3 MG/DL (ref 8.5–10.1)
CHLORIDE SERPL-SCNC: 106 MMOL/L (ref 94–109)
CHLORIDE SERPL-SCNC: 106 MMOL/L (ref 94–109)
CHLORIDE SERPL-SCNC: 107 MMOL/L (ref 94–109)
CHLORIDE SERPL-SCNC: 109 MMOL/L (ref 94–109)
CO2 SERPL-SCNC: 23 MMOL/L (ref 20–32)
CO2 SERPL-SCNC: 23 MMOL/L (ref 20–32)
CO2 SERPL-SCNC: 27 MMOL/L (ref 20–32)
CO2 SERPL-SCNC: 28 MMOL/L (ref 20–32)
CREAT SERPL-MCNC: 1.31 MG/DL (ref 0.66–1.25)
CREAT SERPL-MCNC: 1.32 MG/DL (ref 0.66–1.25)
CREAT SERPL-MCNC: 1.36 MG/DL (ref 0.66–1.25)
CREAT SERPL-MCNC: 1.36 MG/DL (ref 0.66–1.25)
ERYTHROCYTE [DISTWIDTH] IN BLOOD BY AUTOMATED COUNT: 16.5 % (ref 10–15)
ERYTHROCYTE [DISTWIDTH] IN BLOOD BY AUTOMATED COUNT: 16.7 % (ref 10–15)
ERYTHROCYTE [DISTWIDTH] IN BLOOD BY AUTOMATED COUNT: 16.8 % (ref 10–15)
GFR SERPL CREATININE-BSD FRML MDRD: 54 ML/MIN/{1.73_M2}
GFR SERPL CREATININE-BSD FRML MDRD: 54 ML/MIN/{1.73_M2}
GFR SERPL CREATININE-BSD FRML MDRD: 56 ML/MIN/{1.73_M2}
GFR SERPL CREATININE-BSD FRML MDRD: 56 ML/MIN/{1.73_M2}
GLUCOSE BLDC GLUCOMTR-MCNC: 100 MG/DL (ref 70–99)
GLUCOSE BLDC GLUCOMTR-MCNC: 125 MG/DL (ref 70–99)
GLUCOSE BLDC GLUCOMTR-MCNC: 141 MG/DL (ref 70–99)
GLUCOSE BLDC GLUCOMTR-MCNC: 145 MG/DL (ref 70–99)
GLUCOSE BLDC GLUCOMTR-MCNC: 237 MG/DL (ref 70–99)
GLUCOSE BLDC GLUCOMTR-MCNC: 290 MG/DL (ref 70–99)
GLUCOSE BLDC GLUCOMTR-MCNC: 338 MG/DL (ref 70–99)
GLUCOSE BLDC GLUCOMTR-MCNC: 96 MG/DL (ref 70–99)
GLUCOSE SERPL-MCNC: 103 MG/DL (ref 70–99)
GLUCOSE SERPL-MCNC: 145 MG/DL (ref 70–99)
GLUCOSE SERPL-MCNC: 244 MG/DL (ref 70–99)
GLUCOSE SERPL-MCNC: 336 MG/DL (ref 70–99)
HCO3 BLD-SCNC: 27 MMOL/L (ref 21–28)
HCO3 BLDV-SCNC: 25 MMOL/L (ref 21–28)
HCO3 BLDV-SCNC: 25 MMOL/L (ref 21–28)
HCT VFR BLD AUTO: 24.7 % (ref 40–53)
HCT VFR BLD AUTO: 25 % (ref 40–53)
HCT VFR BLD AUTO: 26.6 % (ref 40–53)
HGB BLD-MCNC: 7.9 G/DL (ref 13.3–17.7)
HGB BLD-MCNC: 8 G/DL (ref 13.3–17.7)
HGB BLD-MCNC: 8.4 G/DL (ref 13.3–17.7)
MAGNESIUM SERPL-MCNC: 2.3 MG/DL (ref 1.6–2.3)
MAGNESIUM SERPL-MCNC: 2.3 MG/DL (ref 1.6–2.3)
MCH RBC QN AUTO: 30.3 PG (ref 26.5–33)
MCH RBC QN AUTO: 30.5 PG (ref 26.5–33)
MCH RBC QN AUTO: 30.5 PG (ref 26.5–33)
MCHC RBC AUTO-ENTMCNC: 31.6 G/DL (ref 31.5–36.5)
MCHC RBC AUTO-ENTMCNC: 32 G/DL (ref 31.5–36.5)
MCHC RBC AUTO-ENTMCNC: 32 G/DL (ref 31.5–36.5)
MCV RBC AUTO: 95 FL (ref 78–100)
MCV RBC AUTO: 95 FL (ref 78–100)
MCV RBC AUTO: 96 FL (ref 78–100)
O2/TOTAL GAS SETTING VFR VENT: 40 %
O2/TOTAL GAS SETTING VFR VENT: 40 %
O2/TOTAL GAS SETTING VFR VENT: NORMAL %
OXYHGB MFR BLDV: 70 %
OXYHGB MFR BLDV: 75 %
PCO2 BLD: 37 MM HG (ref 35–45)
PCO2 BLDV: 39 MM HG (ref 40–50)
PCO2 BLDV: 40 MM HG (ref 40–50)
PH BLD: 7.47 PH (ref 7.35–7.45)
PH BLDV: 7.41 PH (ref 7.32–7.43)
PH BLDV: 7.41 PH (ref 7.32–7.43)
PHOSPHATE SERPL-MCNC: 2.4 MG/DL (ref 2.5–4.5)
PHOSPHATE SERPL-MCNC: 2.7 MG/DL (ref 2.5–4.5)
PLATELET # BLD AUTO: 116 10E9/L (ref 150–450)
PLATELET # BLD AUTO: 119 10E9/L (ref 150–450)
PLATELET # BLD AUTO: 126 10E9/L (ref 150–450)
PO2 BLD: 104 MM HG (ref 80–105)
PO2 BLDV: 38 MM HG (ref 25–47)
PO2 BLDV: 41 MM HG (ref 25–47)
POTASSIUM SERPL-SCNC: 4.3 MMOL/L (ref 3.4–5.3)
POTASSIUM SERPL-SCNC: 4.8 MMOL/L (ref 3.4–5.3)
POTASSIUM SERPL-SCNC: 4.8 MMOL/L (ref 3.4–5.3)
POTASSIUM SERPL-SCNC: 5 MMOL/L (ref 3.4–5.3)
RBC # BLD AUTO: 2.59 10E12/L (ref 4.4–5.9)
RBC # BLD AUTO: 2.62 10E12/L (ref 4.4–5.9)
RBC # BLD AUTO: 2.77 10E12/L (ref 4.4–5.9)
SODIUM SERPL-SCNC: 135 MMOL/L (ref 133–144)
SODIUM SERPL-SCNC: 135 MMOL/L (ref 133–144)
SODIUM SERPL-SCNC: 137 MMOL/L (ref 133–144)
SODIUM SERPL-SCNC: 137 MMOL/L (ref 133–144)
SPECIMEN SOURCE: NORMAL
WBC # BLD AUTO: 11.1 10E9/L (ref 4–11)
WBC # BLD AUTO: 18.3 10E9/L (ref 4–11)
WBC # BLD AUTO: 19.1 10E9/L (ref 4–11)

## 2020-07-23 PROCEDURE — 25000132 ZZH RX MED GY IP 250 OP 250 PS 637: Performed by: STUDENT IN AN ORGANIZED HEALTH CARE EDUCATION/TRAINING PROGRAM

## 2020-07-23 PROCEDURE — 82805 BLOOD GASES W/O2 SATURATION: CPT | Performed by: PEDIATRICS

## 2020-07-23 PROCEDURE — 40000196 ZZH STATISTIC RAPCV CVP MONITORING

## 2020-07-23 PROCEDURE — 85730 THROMBOPLASTIN TIME PARTIAL: CPT | Performed by: STUDENT IN AN ORGANIZED HEALTH CARE EDUCATION/TRAINING PROGRAM

## 2020-07-23 PROCEDURE — C9399 UNCLASSIFIED DRUGS OR BIOLOG: HCPCS

## 2020-07-23 PROCEDURE — 83735 ASSAY OF MAGNESIUM: CPT | Performed by: STUDENT IN AN ORGANIZED HEALTH CARE EDUCATION/TRAINING PROGRAM

## 2020-07-23 PROCEDURE — 25800030 ZZH RX IP 258 OP 636: Performed by: STUDENT IN AN ORGANIZED HEALTH CARE EDUCATION/TRAINING PROGRAM

## 2020-07-23 PROCEDURE — 25000128 H RX IP 250 OP 636: Performed by: STUDENT IN AN ORGANIZED HEALTH CARE EDUCATION/TRAINING PROGRAM

## 2020-07-23 PROCEDURE — 40000983 ZZH STATISTIC HFNC ADULT NON-CPAP

## 2020-07-23 PROCEDURE — 82803 BLOOD GASES ANY COMBINATION: CPT | Performed by: STUDENT IN AN ORGANIZED HEALTH CARE EDUCATION/TRAINING PROGRAM

## 2020-07-23 PROCEDURE — 71045 X-RAY EXAM CHEST 1 VIEW: CPT

## 2020-07-23 PROCEDURE — 25000131 ZZH RX MED GY IP 250 OP 636 PS 637: Performed by: STUDENT IN AN ORGANIZED HEALTH CARE EDUCATION/TRAINING PROGRAM

## 2020-07-23 PROCEDURE — C9113 INJ PANTOPRAZOLE SODIUM, VIA: HCPCS | Performed by: SURGERY

## 2020-07-23 PROCEDURE — 93010 ELECTROCARDIOGRAM REPORT: CPT | Performed by: INTERNAL MEDICINE

## 2020-07-23 PROCEDURE — 97164 PT RE-EVAL EST PLAN CARE: CPT | Mod: GP

## 2020-07-23 PROCEDURE — 80048 BASIC METABOLIC PNL TOTAL CA: CPT | Performed by: SURGERY

## 2020-07-23 PROCEDURE — 85730 THROMBOPLASTIN TIME PARTIAL: CPT | Performed by: SURGERY

## 2020-07-23 PROCEDURE — 80048 BASIC METABOLIC PNL TOTAL CA: CPT | Performed by: STUDENT IN AN ORGANIZED HEALTH CARE EDUCATION/TRAINING PROGRAM

## 2020-07-23 PROCEDURE — 25000128 H RX IP 250 OP 636: Performed by: SURGERY

## 2020-07-23 PROCEDURE — 25000125 ZZHC RX 250: Performed by: SURGERY

## 2020-07-23 PROCEDURE — 97530 THERAPEUTIC ACTIVITIES: CPT | Mod: GP

## 2020-07-23 PROCEDURE — 20000004 ZZH R&B ICU UMMC

## 2020-07-23 PROCEDURE — 40000014 ZZH STATISTIC ARTERIAL MONITORING DAILY

## 2020-07-23 PROCEDURE — 84100 ASSAY OF PHOSPHORUS: CPT | Performed by: STUDENT IN AN ORGANIZED HEALTH CARE EDUCATION/TRAINING PROGRAM

## 2020-07-23 PROCEDURE — 85027 COMPLETE CBC AUTOMATED: CPT | Performed by: SURGERY

## 2020-07-23 PROCEDURE — 25000132 ZZH RX MED GY IP 250 OP 250 PS 637: Performed by: SURGERY

## 2020-07-23 PROCEDURE — 40000047 ZZH STATISTIC CTO2 CONT OXYGEN TECH TIME EA 90 MIN

## 2020-07-23 PROCEDURE — 83735 ASSAY OF MAGNESIUM: CPT | Performed by: SURGERY

## 2020-07-23 PROCEDURE — 00000146 ZZHCL STATISTIC GLUCOSE BY METER IP

## 2020-07-23 PROCEDURE — 25800030 ZZH RX IP 258 OP 636: Performed by: SURGERY

## 2020-07-23 PROCEDURE — 40000048 ZZH STATISTIC DAILY SWAN MONITORING

## 2020-07-23 PROCEDURE — 40000275 ZZH STATISTIC RCP TIME EA 10 MIN

## 2020-07-23 PROCEDURE — 97165 OT EVAL LOW COMPLEX 30 MIN: CPT | Mod: GO

## 2020-07-23 PROCEDURE — 99233 SBSQ HOSP IP/OBS HIGH 50: CPT | Mod: GC | Performed by: INTERNAL MEDICINE

## 2020-07-23 PROCEDURE — 84100 ASSAY OF PHOSPHORUS: CPT | Performed by: SURGERY

## 2020-07-23 PROCEDURE — 97535 SELF CARE MNGMENT TRAINING: CPT | Mod: GO

## 2020-07-23 PROCEDURE — 94799 UNLISTED PULMONARY SVC/PX: CPT

## 2020-07-23 PROCEDURE — 85027 COMPLETE CBC AUTOMATED: CPT | Performed by: STUDENT IN AN ORGANIZED HEALTH CARE EDUCATION/TRAINING PROGRAM

## 2020-07-23 PROCEDURE — 25000132 ZZH RX MED GY IP 250 OP 250 PS 637: Performed by: INTERNAL MEDICINE

## 2020-07-23 PROCEDURE — 25000131 ZZH RX MED GY IP 250 OP 636 PS 637: Performed by: INTERNAL MEDICINE

## 2020-07-23 RX ORDER — NICOTINE POLACRILEX 4 MG
15-30 LOZENGE BUCCAL
Status: DISCONTINUED | OUTPATIENT
Start: 2020-07-23 | End: 2020-07-23

## 2020-07-23 RX ORDER — DEXTROSE MONOHYDRATE 25 G/50ML
25-50 INJECTION, SOLUTION INTRAVENOUS
Status: DISCONTINUED | OUTPATIENT
Start: 2020-07-23 | End: 2020-07-23

## 2020-07-23 RX ORDER — PANTOPRAZOLE SODIUM 40 MG/1
40 TABLET, DELAYED RELEASE ORAL
Status: DISCONTINUED | OUTPATIENT
Start: 2020-07-24 | End: 2020-08-03 | Stop reason: HOSPADM

## 2020-07-23 RX ORDER — MYCOPHENOLATE MOFETIL 250 MG/1
1500 CAPSULE ORAL
Status: DISCONTINUED | OUTPATIENT
Start: 2020-07-23 | End: 2020-08-03 | Stop reason: HOSPADM

## 2020-07-23 RX ORDER — FUROSEMIDE 10 MG/ML
20 INJECTION INTRAMUSCULAR; INTRAVENOUS ONCE
Status: COMPLETED | OUTPATIENT
Start: 2020-07-23 | End: 2020-07-23

## 2020-07-23 RX ORDER — ACETAMINOPHEN 325 MG/1
975 TABLET ORAL EVERY 8 HOURS
Status: DISCONTINUED | OUTPATIENT
Start: 2020-07-23 | End: 2020-07-31

## 2020-07-23 RX ORDER — MYCOPHENOLATE MOFETIL 200 MG/ML
1500 POWDER, FOR SUSPENSION ORAL
Status: DISCONTINUED | OUTPATIENT
Start: 2020-07-23 | End: 2020-07-23

## 2020-07-23 RX ADMIN — CEFTRIAXONE SODIUM 2 G: 2 INJECTION, POWDER, FOR SOLUTION INTRAMUSCULAR; INTRAVENOUS at 14:06

## 2020-07-23 RX ADMIN — BIVALIRUDIN 0.08 MG/KG/HR: 250 INJECTION, POWDER, LYOPHILIZED, FOR SOLUTION INTRAVENOUS at 17:36

## 2020-07-23 RX ADMIN — ISOPROTERENOL HYDROCHLORIDE 0.01 MCG/KG/MIN: 0.2 INJECTION, SOLUTION INTRAMUSCULAR; INTRAVENOUS at 13:56

## 2020-07-23 RX ADMIN — ACETAMINOPHEN 975 MG: 325 TABLET, FILM COATED ORAL at 14:06

## 2020-07-23 RX ADMIN — NYSTATIN 1000000 UNITS: 100000 SUSPENSION ORAL at 17:39

## 2020-07-23 RX ADMIN — POLYETHYLENE GLYCOL 3350 17 G: 17 POWDER, FOR SOLUTION ORAL at 08:21

## 2020-07-23 RX ADMIN — PREDNISONE 35 MG: 5 TABLET ORAL at 20:42

## 2020-07-23 RX ADMIN — FUROSEMIDE 20 MG: 10 INJECTION, SOLUTION INTRAVENOUS at 10:58

## 2020-07-23 RX ADMIN — ONDANSETRON 4 MG: 2 INJECTION INTRAMUSCULAR; INTRAVENOUS at 00:00

## 2020-07-23 RX ADMIN — NYSTATIN 1000000 UNITS: 100000 SUSPENSION ORAL at 11:48

## 2020-07-23 RX ADMIN — MYCOPHENOLATE MOFETIL 1500 MG: 250 CAPSULE ORAL at 17:39

## 2020-07-23 RX ADMIN — PREDNISONE 35 MG: 5 TABLET ORAL at 08:22

## 2020-07-23 RX ADMIN — STANDARDIZED SENNA CONCENTRATE AND DOCUSATE SODIUM 2 TABLET: 8.6; 5 TABLET ORAL at 08:22

## 2020-07-23 RX ADMIN — MYCOPHENOLATE MOFETIL 1500 MG: 500 INJECTION, POWDER, LYOPHILIZED, FOR SOLUTION INTRAVENOUS at 06:27

## 2020-07-23 RX ADMIN — INSULIN ASPART 1 UNITS: 100 INJECTION, SOLUTION INTRAVENOUS; SUBCUTANEOUS at 08:30

## 2020-07-23 RX ADMIN — INSULIN ASPART 8 UNITS: 100 INJECTION, SOLUTION INTRAVENOUS; SUBCUTANEOUS at 11:53

## 2020-07-23 RX ADMIN — PROCHLORPERAZINE EDISYLATE 5 MG: 5 INJECTION INTRAMUSCULAR; INTRAVENOUS at 00:09

## 2020-07-23 RX ADMIN — ACETAMINOPHEN 975 MG: 325 TABLET, FILM COATED ORAL at 20:43

## 2020-07-23 RX ADMIN — NYSTATIN 1000000 UNITS: 100000 SUSPENSION ORAL at 08:21

## 2020-07-23 RX ADMIN — INSULIN ASPART 4 UNITS: 100 INJECTION, SOLUTION INTRAVENOUS; SUBCUTANEOUS at 16:33

## 2020-07-23 RX ADMIN — NYSTATIN 1000000 UNITS: 100000 SUSPENSION ORAL at 20:42

## 2020-07-23 RX ADMIN — STANDARDIZED SENNA CONCENTRATE AND DOCUSATE SODIUM 2 TABLET: 8.6; 5 TABLET ORAL at 20:43

## 2020-07-23 RX ADMIN — PANTOPRAZOLE SODIUM 40 MG: 40 INJECTION, POWDER, FOR SOLUTION INTRAVENOUS at 08:22

## 2020-07-23 RX ADMIN — ASPIRIN 81 MG CHEWABLE TABLET 81 MG: 81 TABLET CHEWABLE at 08:22

## 2020-07-23 ASSESSMENT — ACTIVITIES OF DAILY LIVING (ADL)
ADLS_ACUITY_SCORE: 16
ADLS_ACUITY_SCORE: 14
ADLS_ACUITY_SCORE: 16
ADLS_ACUITY_SCORE: 14

## 2020-07-23 ASSESSMENT — MIFFLIN-ST. JEOR: SCORE: 1539

## 2020-07-23 NOTE — PLAN OF CARE
D: Post op heart transplant. Epi, norepi and isuprel drips. Extubated yesterday. 100% paced AAI  I/A: Norepi stopped this am. Maps continue to be in the 80s. Pacemaker rate decreased to 90, currently SR 95-99 bpm. Epi weaned off this afternoon. No change in hemodynamics, see flowsheets. Up in chair x2.   R: Will continue to monitor/assess and update MD as needed.

## 2020-07-23 NOTE — PROGRESS NOTES
BRIEF HEMATOLOGY PROGRESS NOTE      Lucian Henderson Sr. is a 64 year old male who underwent OHT on 7/19. 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 Subsequent CORRINE was positive confirming HIT.     B/L UE Doppler 7/22/20 demonstrated occlusive thrombus in the superficial cephalic vein and nonocclusive thrombus in the RIJ along the intravenous catheter as well as R axillary vein along the R PICC line.      Patient was started on IV Bivalirudin on 7/22. This can be continued for at least 5 days or until recovery of platelet counts. We had earlier suggested coumadin but it will be appropriate to transition him to fondaparinux instead after Bivalirudin is stopped if that is the preference of primary team. Dose of fondaparinux will be 7.5 mg subcutaneous daily (patient weight ~ 85 kg today).      We will continue to follow. Above was discussed with Dr. Garza.      Ty Aguirre   Hematology/Oncology Fellow  Pager: [phone]   Attending  The patient was seen and examined by me separate from the resident/fellow provider.The note above reflects my assessment and plan. I have personally reviewed today's labs,vital and radiology results. The points of care that were added by me are:    Agree to with bivalirudin, follow platelet count and Hgb watch for bleeding.Agree that Fondaparinux transition makes most sense with likely biopsies required for cardiac issues  William Garza M.D.  [phone]

## 2020-07-23 NOTE — PLAN OF CARE
Discharge Planner PT   Patient plan for discharge: not discussed today  Current status: PT: Re-Eval completed, treat initiated. Pt VSS throughout session, close RN assistance for many lines. Pt seated in chair, very agreeable to PT,  ipad used throughout although pt speaks conversationally in English. Pt transfers mod A sit<>stand from chair to walker, to bed, to commode and back to bed. Heavy min/mod A in standing for hygeine. Heavy min A for first chair>bed transfer with wh walker, then progressed to min/mod A to commode and back all with wh walker as knees giving slightly. Pt needed heavy mod A sit>supine and dependent boost, RN attending to pt needs at end session, pt felt well although fatigued.  Barriers to return to prior living situation: medical, functional status  Recommendations for discharge: TCU  Rationale for recommendations: based on current function       Entered by: Uma Vincent 07/23/2020 6:17 PM

## 2020-07-23 NOTE — PROGRESS NOTES
[facility]   Cardiology II Service / Advanced Heart Failure  Progress note    Lucian Morillo  : 1955  MRN # [id number]    ADMIT DATE: 7/3/2020    Changes today  - Wean pressors as tolerated       - Decrease temp pacer rate (patient has underlying sinus tach at 100 bpm)   - Lasix 20 mg IV once, goal CVP 10-12  - 2nd dose of Simulect tomorrow   - Start tacro once renal function normalizes   - Tentative plan to start valcyte tomorrow and bactrim (~) pending renal fxn   - Bivalirudin for HIT, will transition to fondaparinux in coming days once stable from bleeding standpoint      ASSESSMENT & PLAN:  Lucian Henderson Sr. is a 64 year old male with a PMH of HTN, DMII, obesity, CKD, CHANTEL, ischemic cardiomyopathy and severe LV systolic dysfunction s/p 3V CABG () and primary prevention ICD ( 4/2/15). Currently he presents with 3 days of worsening fatigue and SOB with minimal exertion. Patient claims he could walk 1-2 blocks last week but has been barely able to ambulate home recently.     Admitted for possible LVAD/Transplant work up. IABP and listed status 2 for heart transplant . HIT + , underwent PLEX, s/p . Post operative course complicated by graft dysfunction and VT. Graft function back to near normal , LVEF 50-55%.      Date RA MPAP PCWP SV02 Jacy CO Jacy CI MAP SVR PVR Intervention   20 8 50 38 49 2.3 1.28 89 2400 5.2    20  6 48 28 35 4.0 2.1 69 1300 2.2 Nipride, PO afterload reduction    7/10 9 56/34 32 68 5.1 2.7 68 900 1.8 Nipride 1, PO afterload reduction    14 65/42/50 36 60 4.3 2.2 89 1100 1.8 Nipride 0.25, PO afterload reduction    7/15 8 52/28/36 24 66 4.1 2.2 100 1200 2.9 IABP 1:1, PO afterload reduction     7 50/24/33 24 70 5.6 3.0 94 1200 1.6 IABP 1:1, Dopamine 2.5    9 56//35 22 62 4.8 2.6 75 1100 2.7 IABP 1:1    15 32 16 58 5.2 2.8 61 746 1.9 IABP 1:1, Epi 0.05, NE 0.03, Vas    10  16 72  6.0 3.5 71 748 0.83 IABP 1;1, Epi 0.05, NE 0.05   7/22 11 34/18/23 16 74 7.4 3.9 71 700 0.95 IABP 1:1, epi 0.02, NE 0.02   7/23 10 36/18/24 18 65 6.9 3.6 82 1100 0.87 IABP 1:1, epi 0.02, NE 0.01       # ICM s/p 3V CABG (2008) and primary prevention ICD ( 4/2/15)  # HFrEF Stage D, NYHA III-IV s/p OHT 7/19  # Ambulatory cardiogenic shock  # Arrhythmia: HF associated VT/VF, VT (7/20, post op from OHT)   # Graft dysfunction (EF 20-25%)   Graft Function: TTE POD#1 w/ LVEF 20-25%, RV severely reduced, suspect 2/2 to ischemic time, minimal change in LV fxn 7/21, consider IABP wean tomorrow    --Volume: slightly volume up (goal CVP 10-12), lasix PRN   --BP: MAP goal >65   --HR: Goal >100,   -->isopreterenol at 0.01 (can add terbutaline 10 mg PO Q6H to wean in coming days)   -->temporary pacer set at  given VT     Pressors/Inotropes:  -continue NE and epi, wean   -NO at 20, wean off 7/22    Immunosuppression:  -s/p Simulect on POD#0, second dose POD#4 (7/24)  -MMF 1500 mg BID  -s/p solumedrol 125 mg q8h  --> transitioned to prednisone  w/ taper   -will start Tacro later   -routine endomyocardial bx 7 days post transplant (7/27)      Antibiotics:   - continue vancomycin (will get 3 days)  - continue cefepime (if pt came from home w)  - Nystatin Swish&Swallow  - start bactrim in coming days   - CMV: R+ / D+, start valcyte in coming days   - EBV: R+ / D+  - f/u blood cx, NGTD   - donor blood cx growing strep salivarius in 1/2 bottles  -->transplant ID consult 7/21, rec narrow to ceftriaxone     # Atrial Flutter with RVR  Patient went into Atrial flutter with RVR with rates around 150 overnight on 7/7/2020 at around 2 am. Converted to NSR with amiodarone, which was stopped 7/19 given OHT.     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 Terrazas.     Héctor Smith MD  "  Cardiology Fellow, PGY-5  [phone]   ..........................................................................................................................................................  Subjective:  SARITA overnight. No arrhythmias. Extubated. Denies any SOB, n/v or abdominal pain.     PHYSICAL EXAM:  BP (!) 153/50   Pulse 115   Temp 97.9  F (36.6  C) (Pulmonary Artery)   Resp 14   Ht 1.626 m (5' 4\")   Wt 84.8 kg (186 lb 15.2 oz)   SpO2 94%   BMI 32.09 kg/m    GENERAL: NAD  NECK: Supple and without lymphadenopathy. JVP 11 cm  CV: S1/S2 heard without murmur   RESPIRATORY: CTAB  GI: Soft and distended. No tenderness, rebound, guarding. No palpable organomegaly.   EXTREMITIES: Peripheral edema trace.   NEUROLOGIC: Follows commands, moves all four extremities   MUSCULOSKELETAL: No joint swelling or tenderness.   SKIN: No jaundice. No acute rashes or lesions.     ROS:   12-pt ROS otherwise negative except as noted above    PMH:  Past Medical History:   Diagnosis Date     CAD (coronary artery disease)      CHF (congestive heart failure) (H)      CKD (chronic kidney disease), stage III (H)      Cortical cataract of both eyes      Diabetes (H)      Hyperlipidemia      Hypertension      Ischemic cardiomyopathy      Obesity      CHANTEL (obstructive sleep apnea)     occas cpap     Osteoarthritis        PSH:  Past Surgical History:   Procedure Laterality Date     C CABG, ARTERY-VEIN, THREE  02/2008     CATARACT IOL, RT/LT       COLONOSCOPY N/A 8/7/2019    Procedure: COLONOSCOPY, WITH POLYPECTOMY AND BIOPSY;  Surgeon: Chauncey Morataya MD;  Location:  GI     CV RIGHT HEART CATH N/A 3/25/2019    Procedure: CV RIGHT HEART CATH;  Surgeon: Moises Santos MD;  Location: [facility]ARD[facility] CATH [facility]     CV RIGHT HEART CATH N/A 7/10/2019    Procedure: CV RIGHT HEART CATH;  Surgeon: Jak Mccabe MD;  Location:  HEART CARDIAC CATH LAB     CV RIGHT HEART CATH N/A 7/8/2020    Procedure: Right Heart Cath with Leave In " marcela already has ICU load;  Surgeon: Jak Mccabe MD;  Location:  HEART CARDIAC CATH LAB     EXTRACTION(S) DENTAL Left 7/13/2020    Procedure: EXTRACTION, TOOTH #11, 12, 13, 15, and 29;  Surgeon: Monica Chao DDS;  Location:  OR     IMPLANT AUTOMATIC IMPLANTABLE CARDIOVERTER DEFIBRILLATOR       INSERT INTRAAORTIC BALLOON PUMP N/A 7/16/2020    Procedure: Subclavian Intra-Aortic Balloon Pump Placement;  Surgeon: Allan Sparrow MD;  Location: [facility] OR     PHACOEMULSIFICATION CLEAR CORNEA WITH STANDARD IOL, VITRECTOMY PARSPLANA 25 GAGUE, COMBINED Left 12/10/2019    Procedure: PHACOEMULSIFICATION, CATARACT, CLEAR CORNEAL INCISION APPROACH, W STD INTRAOCULAR LENS IMPLANT INSERT + VITRECTOMY BY PARS PLANA  USING 25-GAUGE INSTRUMENTS. ENDOLASER, INFUSION OF 20% SF6 GAS;  Surgeon: Triny Bunch MD;  Location: [facility] OR     PICC INSERTION Right 07/11/2020    basilic 44 cm total      TRANSPLANT HEART RECIPIENT N/A 7/19/2020    Procedure: REDO MEDIAN STERNOTOMY, TRANSPLANT, ORTHOTOPIC HEART, RECIPIENT, ON PUMP OXYGENATOR, REMOVAL OF CARDIAC DEFIBRILLATOR AND LEAD;  Surgeon: Griselli, Massimo, MD;  Location:  OR       MEDICATIONS:  Prior to Admission Medications   Prescriptions Last Dose Informant Patient Reported? Taking?   aspirin 81 MG tablet  Self No No   Sig: Take 1 tablet (81 mg) by mouth daily   atorvastatin (LIPITOR) 40 MG tablet  Self No No   Sig: Take 1 tablet (40 mg) by mouth daily   blood glucose (ACCU-CHEK SMARTVIEW) test strip  Self No No   Sig: USE TO TEST THREE TIMES DAILY AS DIRECTED   blood glucose (NO BRAND SPECIFIED) test strip  Self No No   Sig: Use to test blood sugar 2 times daily or as directed.   blood glucose monitoring (ACCU-CHEK FELIPE SMARTVIEW) meter device kit  Self No No   Sig: Use to test blood sugar 3-4 times daily, as directed.   blood glucose monitoring (ONE TOUCH DELICA) lancets  Self No No   Sig: Use to test blood sugars 2 times daily.   clopidogrel (PLAVIX) 75 MG tablet   Self No No   Sig: Take 1 tablet (75 mg) by mouth daily   exenatide ER (BYDUREON) 2 MG pen  Self No No   Sig: Inject 2 mg Subcutaneous every 7 days   furosemide (LASIX) 20 MG tablet  Self No No   Sig: Take 2 tablets (40 mg) by mouth 2 times daily   glimepiride (AMARYL) 4 MG tablet  Self No No   Sig: Take 1 tablet (4 mg) by mouth every morning (before breakfast)   hydrALAZINE (APRESOLINE) 25 MG tablet  Self No No   Sig: Take 1 tablet (25 mg) by mouth 3 times daily   insulin glargine (LANTUS SOLOSTAR) 100 UNIT/ML pen  Self No No   Sig: Take 8 units in the morning and 40 units in the evening   insulin pen needle (B-D U/F) 31G X 5 MM miscellaneous  Self No No   Si Units by Device route 2 times daily Use 2 pen needles daily or as directed.   isosorbide mononitrate (IMDUR) 60 MG 24 hr tablet  Self No No   Sig: Take 1 tablet (60 mg) by mouth daily   losartan (COZAAR) 50 MG tablet  Spouse/Significant Other No No   Sig: Take 1 tablet (50 mg) by mouth daily   nitroglycerin (NITROSTAT) 0.4 MG SL tablet  Self No No   Sig: Place 1 tablet (0.4 mg) under the tongue every 5 minutes as needed for chest pain If you are still having symptoms after 3 doses (15 minutes) call 911.   order for DME  Self No No   Sig: Auto CPAP 8-15 cmH20      Facility-Administered Medications Last Administration Doses Remaining   erythromycin (ROMYCIN) ophthalmic ointment None recorded    lidocaine (PF) (XYLOCAINE) 2 % injection 10 mL None recorded 1           ALLERGIES:     Allergies   Allergen Reactions     Heparin Heparin Induced Thrombocytopenia     HIT screen sent 2020. CORRINE confirmed positive     No Known Drug Allergies        FAMILY HISTORY:  Family History   Problem Relation Age of Onset     Diabetes Brother      Diabetes Sister      Diabetes Sister      Macular Degeneration No family hx of      Glaucoma No family hx of      Myocardial Infarction No family hx of      Kidney Disease No family hx of        SOCIAL HISTORY:  Social History      Socioeconomic History     Marital status:      Spouse name: Not on file     Number of children: 4     Years of education: Not on file     Highest education level: Not on file   Occupational History     Occupation:      Employer: [employer]     Employer: RETIRED   Social Needs     Financial resource strain: Not on file     Food insecurity     Worry: Not on file     Inability: Not on file     Transportation needs     Medical: Not on file     Non-medical: Not on file   Tobacco Use     Smoking status: Never Smoker     Smokeless tobacco: Never Used     Tobacco comment: Never smoked; non-smoking household   Substance and Sexual Activity     Alcohol use: No     Alcohol/week: 0.0 standard drinks     Drug use: No     Sexual activity: Yes     Partners: Female   Lifestyle     Physical activity     Days per week: Not on file     Minutes per session: Not on file     Stress: Not on file   Relationships     Social connections     Talks on phone: Not on file     Gets together: Not on file     Attends Hinduism service: Not on file     Active member of club or organization: Not on file     Attends meetings of clubs or organizations: Not on file     Relationship status: Not on file     Intimate partner violence     Fear of current or ex partner: Not on file     Emotionally abused: Not on file     Physically abused: Not on file     Forced sexual activity: Not on file   Other Topics Concern     Parent/sibling w/ CABG, MI or angioplasty before 65F 55M? No   Social History Narrative     Not on file       LABS:  BMP  Recent Labs   Lab 07/23/20  0411 07/22/20  1608 07/22/20  0414 07/21/20  2153 07/21/20  1540    140 142 142 141   POTASSIUM 4.8 4.9 4.6 4.4 4.6   CHLORIDE 107 110* 110* 112* 110*   CO2 27 25 27 27 28   BUN 39* 40* 38* 36* 34*   CR 1.32* 1.47* 1.54* 1.47* 1.62*   * 134* 121* 160* 117*   BRYANNA 8.3* 8.1* 8.4* 7.8* 8.6   PHOS 2.7 2.8 4.0  --  4.7*     CBC  Recent Labs   Lab 07/23/20 0411  07/22/20  1608 07/22/20  0414 07/21/20  2153  07/19/20  1613   WBC 19.1* 22.5* 24.8* 25.4*   < > 7.7   HGB 8.0* 8.4* 8.2* 8.2*   < > 10.8*   HCT 25.0* 25.9* 25.3* 25.3*   < > 34.0*   MCV 95 95 94 95   < > 96   * 123* 129* 128*   < > 99*   LYMPH  --   --   --   --   --  7.9    < > = values in this interval not displayed.     INR  Recent Labs   Lab 07/23/20  0411 07/22/20  2122 07/22/20  1608 07/20/20  1610 07/20/20  0506 07/20/20  0232 07/20/20  0018   INR  --   --   --  1.35* 1.35* 1.52* 1.59*   PTT 64* 59* 31  --  37 36 30     IMAGING:    RHC 6/18/20     RA 20/26/18  RV 85/28  PA 82/45/58  PCWP 45  Cardiac output by Jacy: 3.85 L/min (indexed to 2.09 L/min/m2)  Cardiac output by thermodilution: 3.63 L/min (indexed to 1.97 L/min/m2)  SVR (by TD CO): 1123  PVR (by TD CO): 7.4    Angiogram 6/18/20   3 vessel CAD s/p 3V CABG in 2008 (LIMA-LAD, SVG-OM1, SVG-RPDA)  2. Known proximal SVG-RPDA occlusion  3. Presumed occlusion of SVG-OM1 (unable to locate on non-selective aortic root shot)  4. Patent LIMA-LAD with collateralization of LAD to PDA    Echocardiogram ( 3/11/2019)   Moderate to severe left ventricular dilation is present.  Severely (EF 10-20%) reduced left ventricular function is present.  No significant valve dysfunction.  Compared to study done 6/5/17 there is no significant change in LV size and  systolic function    Echocardiogram ( 7/5/2020)   Interpretation Summary  Severely (EF 10-20%) reduced left ventricular function is present. LVEF 15%  based on biplane 2D tracing. Severe left ventricular dilation is present.  LVIDd:6.8 cm. Grade III or advanced diastolic dysfunction.  The right ventricle is normal size.  Global right ventricular function is moderately reduced.  No significant valvular abnormalities were noted.  IVC diameter and respiratory changes fall into an intermediate range  suggesting an RA pressure of 8 mmHg.  Mild pulmonary hypertension is present.     This study was compared with the  study from 3/25/2019. RV function has  worsened, LV size and function appear similar.  Devices  ICD ([employer]- 4/2/2015 )     I have reviewed today's vital signs, notes, medications, labs and imaging.  I have also seen and examined the patient and agree with the findings and plan as outlined above.  Pt without complaints with T 97.9, , 123/57 and MAP 80s with CVP 10, PA 32/16 and CI 4 with 1650 ml UO on low dose epi and bivaliruden gtt.  Lungs clear and S1 and S2 tachy.  No rub.  Labs with Cr 1.3 and WBC 19.1.  Assessment: Pt with OHT POD #3 on the simulect protocol on steroids and MMF.  Bivaliruden for DVT and HIT positive.  Pt seen X3 for total critical care time 35 min.     James Terrazas MD, PhD  Professor, Heart Failure and Cardiac Transplantation  [facility]

## 2020-07-23 NOTE — PROGRESS NOTES
Donor Culture Results:    Preliminary / Final positive donor blood/urine/sputum culture results have been uploaded into [facility]. Donor ID [id number].  Dr. Terrazas notified of results.

## 2020-07-23 NOTE — PROGRESS NOTES
Care Coordinator Progress Note    Admission Date/Time:  7/3/2020  Attending MD:  Griselli, Massimo, MD    Data  Chart reviewed, discussed with interdisciplinary team.   Patient was admitted for:    Acute on chronic systolic congestive heart failure (H)  S/P orthotopic heart transplant (H).    Assessment  Pt had heart transplant on 7/19/20 and extubated yesterday, 7/22.  PT/OT are following.  RNCC will cont to follow plan of care.     Plan  Anticipated Discharge Date:  TBD  Anticipated Discharge Plan:   TBD.  RNCC will cont to follow plan of care.    Reagan Diaz RN, PHN, BSN  4A and 4E/ ICU  Care Coordinator  Phone: [phone]  Pager: [phone]

## 2020-07-23 NOTE — PROGRESS NOTES
Major Shift Events:  -Patient alert/orientated overnight after extubation during previous day.   -Inhaled Lalito turned off ~0600 per order  -Patient hemodynamically stable overnight. Epi/Levo/Isuprel gtts remain on per Dr. Griselli.  -Adequate UOP  -Minimal CT output    Plan: Continue to monitor  For vital signs and complete assessments, please see documentation flowsheets.

## 2020-07-23 NOTE — PROGRESS NOTES
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 is a 64 year old male with PMH T2DM, HTN, CKD, CHANTEL, ICM with severe LV dysfunction, s/p 3V CABG (2008), ICD (2015), S/p Heart Transplant on 7/20 with Dr. Griselli.     TODAY'S PROGRESS:   - Wean NE  - Add carb coverage meals TID   - Continue lantus 40 units BID  - No need for additional coverage from donor lungs growing pseudomonas per transplant ID  - Reduce Apacing to 90    PLAN:  Neuro/ pain/ sedation:  #Acute post operative pain  - Monitor neurological status. Notify the MD for any acute changes in exam  - tylenol, oxy/dilaudid/robaxin PRN     Pulmonary care:   #CHANTEL  - Extubated 7/22  - 2L NC   - Titrate FiO2 for SpO2 >92%  - IS, pulmonary hygiene      Cardiovascular:    #ICM with sever LV dysfunction s/p heart transplant 7/20  #S/p 3V CABG (2008)  #VT/VF arrhythmia s/p ICD (2015)  #Atrial flutter with RVR  #HTN/HLD  #IABP (subclavian) removed 7/22  - Monitor hemodynamic status  - MAP >65  - Pressers Epi 0.02, NE 0.01, wean NE  - Isoproterenol 0.01  - Aspirin 81 mg   - Hold PTA atorvastatin 40 mg, plavix 75 mg, lasix 20 mg BID  - Hold PTA hydralazine 25 mg TID, losartan 50 mg     GI/Nutrition:   - Regular diet  - Bowel regimen      Fluids/ Electrolytes/Renal:   #CKD, baseline Cr 1.6  #Hypokalemia, resolved   - TKO for IV fluid hydration   - Urine output is adequate so far  - Will continue to monitor intake and output.  - Cr  1.32 (1.47)  - Replete electrolytes PRN      Endocrine:    #T2DM  # Stress hyperglycemia  - Insulin gtt  - lantus to 40 mg BID   - sliding scale insulin with carb coverage   - PTA exenatide 2mg Q7days, glimepiride 4 mg, Lantus 8 units in the morning and 40 units in the evening.     ID/ Antibiotics:  - Afebrile, WBC 19 (22.5)  - Donor BCX growing: Strep salivarius, likely contaminate donor lungs growing pseudomonas  - Transplant ID signed off 7/22   - Completed Vancomycin (3 day course)  - Will complete 7 days of ceftriaxone which will end on 7/24      Immunosuppression:  -Cellcept, prednisone     Heme:     #Thrombocytopenin  #HIT positive, CORRINE positive  #Occlusive thrombus in superficial R cephalic vein  - Hgb goal >7  - Bival   - Hematology following      Prophylaxis:    - Mechanical prophylaxis for DVT  - Bival   - Protonix qd     Lines/ tubes/ drains:  - MAC (7/19), Slater (7/19), Arterial line (7/19), PIV (7/20), CT     Disposition:  - CV ICU     Patient seen, findings and plan discussed with CV ICU staff, Dr. Alisia Vega MD (CA2)  Anesthesiology Resident  *[id number]    ====================================    SUBJECTIVE:   No acute overnight events. Patient is doing well this morning. Had a BM. Stated he has an appetite.     OBJECTIVE:   1. VITAL SIGNS:   Temp:  [97.9  F (36.6  C)-99.5  F (37.5  C)] 97.9  F (36.6  C)  Heart Rate:  [115] 115  Resp:  [12-16] 14  MAP:  [68 mmHg-92 mmHg] 83 mmHg  Arterial Line BP: ()/(49-71) 123/57  FiO2 (%):  [30 %-40 %] 30 %  SpO2:  [94 %-100 %] 94 %  Ventilation Mode: CPAP/PS  (Continuous positive airway pressure with Pressure Support) (ps per MD order)  FiO2 (%): 30 %  Rate Set (breaths/minute): 12 breaths/min  Tidal Volume Set (mL): 480 mL  PEEP (cm H2O): 6 cmH2O  Pressure Support (cm H2O): 7 cmH2O  Oxygen Concentration (%): 40 %  Resp: 14      2. INTAKE/ OUTPUT:   I/O last 3 completed shifts:  In: 3023.89 [P.O.:360; I.V.:1973.89; NG/GT:240]  Out: 1825 [Urine:1405;  Emesis/NG output:100; Chest Tube:320]    3. PHYSICAL EXAMINATION:   General: Sitting up in chair  Neuro: A&Ox3, NAD  Resp: Breathing non-labored on 2L NC  CV: , Pleural and mediastinal CTs  Abdomen: Soft, Non-distended, Non-tender  Extremities: warm and well perfused    4. INVESTIGATIONS:   Arterial Blood Gases   Recent Labs   Lab 07/23/20  0404 07/22/20  2122 07/22/20  1608 07/22/20  1206   PH 7.47* 7.44 7.44 7.43   PCO2 37 38 39 32*   PO2 104 111* 80 103   HCO3 27 26 27 22     Complete Blood Count   Recent Labs   Lab 07/23/20  0411 07/22/20  1608 07/22/20  0414 07/21/20  2153   WBC 19.1* 22.5* 24.8* 25.4*   HGB 8.0* 8.4* 8.2* 8.2*   * 123* 129* 128*     Basic Metabolic Panel  Recent Labs   Lab 07/23/20  0411 07/22/20  1608 07/22/20  0414 07/21/20  2153    140 142 142   POTASSIUM 4.8 4.9 4.6 4.4   CHLORIDE 107 110* 110* 112*   CO2 27 25 27 27   BUN 39* 40* 38* 36*   CR 1.32* 1.47* 1.54* 1.47*   * 134* 121* 160*     Liver Function Tests  Recent Labs   Lab 07/22/20  0414 07/21/20  0422 07/20/20  1610 07/20/20  0506 07/20/20  0232 07/20/20  0018  07/19/20  1613   AST 28 72*  --   --  108*  --   --  24   ALT 25 34  --   --  39  --   --  21   ALKPHOS 75 67  --   --  69  --   --  113   BILITOTAL 0.4 1.0  --   --  1.8*  --   --  0.6   ALBUMIN 2.4* 2.9*  --   --  2.8*  --   --  2.6*   INR  --   --  1.35* 1.35* 1.52* 1.59*   < > 1.18*    < > = values in this interval not displayed.     Pancreatic Enzymes  Recent Labs   Lab 07/19/20  1613   AMYLASE 36     Coagulation Profile  Recent Labs   Lab 07/23/20  0411 07/22/20  2122 07/22/20  1608 07/20/20  1610 07/20/20  0506 07/20/20  0232 07/20/20  0018   INR  --   --   --  1.35* 1.35* 1.52* 1.59*   PTT 64* 59* 31  --  37 36 30         5. RADIOLOGY:   Recent Results (from the past 24 hour(s))   Echo Limited    Narrative    [id number]  [id number]  [id number]  [id number]^[id number]^WINSOME           [facility]  Echocardiography  Laboratory  [address] [place] [address]     Name: BUCK CABAN  MRN: [id number]  : 1955  Study Date: 2020 07:07 AM  Age: 64 yrs  Gender: Male  Patient Location: [facility]  Reason For Study: Transplant - Heart (POD 3)  Ordering Physician: KAYLI VANESSA  Referring Physician: SHONDA MERCHANT  Performed By: Yfn Unger RDCS     BSA: 1.9 m2  Height: 64 in  Weight: 181 lb  HR: 115  _____________________________________________________________________________  __        Procedure  Limited Portable Echo Adult.  _____________________________________________________________________________  __        Interpretation Summary  Left ventricular size is normal.  The Ejection Fraction is estimated at 50-55%.  Global right ventricular function is mildly reduced.  The inferior vena cava is normal.  No pericardial effusion is present.  Significant improvement in LV function when compared to previous study.  _____________________________________________________________________________  __        Left Ventricle  Left ventricular size is normal. The Ejection Fraction is estimated at 50-55%.     Right Ventricle  Mild right ventricular dilation is present. Global right ventricular function  is mildly reduced.     Vessels  The inferior vena cava is normal.     Pericardium  No pericardial effusion is present.        Compared to Previous Study  Significant improvement in LV function when compared to previous study.  _____________________________________________________________________________  __           Doppler Measurements & Calculations  PA acc time: 0.10 sec     _____________________________________________________________________________  __           Report approved by: Ricardo Herzog 2020 09:04 AM      US Upper Extremity Venous Duplex Bilat   Result Value    Radiologist flags (Urgent)     New right-sided deep venous thrombosis in the right    Narrative    EXAMINATION: DOPPLER  VENOUS ULTRASOUND OF BILATERAL UPPER EXTREMITIES,  7/22/2020 2:43 PM     COMPARISON: Ultrasound upper extremity Doppler 7/7/2020    HISTORY: HIT. Evaluate for DVT.    TECHNIQUE:  Gray-scale evaluation with compression, spectral flow, and  color Doppler assessment of the deep venous system of both upper  extremities.    FINDINGS:  Right: Nonocclusive thrombus demonstrated in the right internal  jugular vein with its intravenous catheter in place. A right PICC line  is demonstrated with nonocclusive thrombus along the PICC demonstrated  in the right axillary vein. The brachial and basilic veins are patent  with right PICC demonstrated in the basilic vein. Occlusive thrombus  in the right cephalic vein extending from the proximal to mid forearm  is demonstrated.    Left: Normal blood flow and waveforms are demonstrated in the internal  jugular, innominate, subclavian, and axillary veins bilaterally. There  is normal compressibility of the brachial, basilic and cephalic veins  bilaterally.      Impression    IMPRESSION:  1.  Nonocclusive thrombus in the right internal jugular vein along the  intravenous catheter demonstrated.  2.  Nonocclusive thrombus along the right PICC line in the right  axillary vein demonstrated.  3.  Occlusive thrombus in the superficial right cephalic vein  demonstrated as above.  4.  No left-sided DVT or superficial thrombus identified.    [Urgent Result: New right-sided deep venous thrombosis in the right  internal jugular vein and right axillary vein. Superficial right  cephalic vein thrombus as discussed above.]    Finding was identified on 7/22/2020 2:43 PM.     Cardiology was contacted by Dr. Galvin at 7/22/2020 3:10 PM and  verbalized understanding of the urgent finding.     TASIA GALVIN MD   US Lower Extremity Venous Duplex Bilateral    Narrative    EXAMINATION: DOPPLER VENOUS ULTRASOUND OF BILATERAL LOWER EXTREMITIES,  7/22/2020 2:43 PM     COMPARISON: Ultrasound abdomen doppler  lower extremities 7/7/2020    HISTORY: HIT. Evaluate for DVT.    TECHNIQUE:  Gray-scale evaluation with compression, spectral flow and  color Doppler assessment of the deep venous system of both legs from  groin to knee, and then at the ankles.    FINDINGS:  In both lower extremities, the common femoral, femoral, popliteal and  posterior tibial veins demonstrate normal compressibility and blood  flow.      Impression    IMPRESSION:  No evidence of deep venous thrombosis in either lower extremity.    TASIA PERKINS MD       =========================================

## 2020-07-24 ENCOUNTER — APPOINTMENT (OUTPATIENT)
Dept: PHYSICAL THERAPY | Facility: CLINIC | Age: 65
DRG: 001 | End: 2020-07-24
Attending: INTERNAL MEDICINE
Payer: COMMERCIAL

## 2020-07-24 ENCOUNTER — HOSPITAL ENCOUNTER (OUTPATIENT)
Facility: CLINIC | Age: 65
End: 2020-07-24
Attending: INTERNAL MEDICINE | Admitting: INTERNAL MEDICINE
Payer: COMMERCIAL

## 2020-07-24 ENCOUNTER — APPOINTMENT (OUTPATIENT)
Dept: OCCUPATIONAL THERAPY | Facility: CLINIC | Age: 65
DRG: 001 | End: 2020-07-24
Attending: INTERNAL MEDICINE
Payer: COMMERCIAL

## 2020-07-24 ENCOUNTER — APPOINTMENT (OUTPATIENT)
Dept: GENERAL RADIOLOGY | Facility: CLINIC | Age: 65
DRG: 001 | End: 2020-07-24
Attending: INTERNAL MEDICINE
Payer: COMMERCIAL

## 2020-07-24 ENCOUNTER — HOSPITAL ENCOUNTER (OUTPATIENT)
Facility: CLINIC | Age: 65
DRG: 857 | End: 2020-07-24
Attending: INTERNAL MEDICINE | Admitting: INTERNAL MEDICINE
Payer: COMMERCIAL

## 2020-07-24 DIAGNOSIS — Z94.1 HEART REPLACED BY TRANSPLANT (H): Primary | ICD-10-CM

## 2020-07-24 DIAGNOSIS — Z11.59 ENCOUNTER FOR SCREENING FOR OTHER VIRAL DISEASES: Primary | ICD-10-CM

## 2020-07-24 DIAGNOSIS — Z94.1 HEART REPLACED BY TRANSPLANT (H): ICD-10-CM

## 2020-07-24 LAB
ABO + RH BLD: NORMAL
ABO + RH BLD: NORMAL
ANION GAP SERPL CALCULATED.3IONS-SCNC: 4 MMOL/L (ref 3–14)
ANION GAP SERPL CALCULATED.3IONS-SCNC: 6 MMOL/L (ref 3–14)
ANION GAP SERPL CALCULATED.3IONS-SCNC: 9 MMOL/L (ref 3–14)
APTT PPP: 61 SEC (ref 22–37)
BACTERIA SPEC CULT: NORMAL
BASE DEFICIT BLDV-SCNC: NORMAL MMOL/L
BASE EXCESS BLDV CALC-SCNC: 0.6 MMOL/L
BASE EXCESS BLDV CALC-SCNC: NORMAL MMOL/L
BLD GP AB SCN SERPL QL: NORMAL
BLOOD BANK CMNT PATIENT-IMP: NORMAL
BUN SERPL-MCNC: 34 MG/DL (ref 7–30)
BUN SERPL-MCNC: 39 MG/DL (ref 7–30)
BUN SERPL-MCNC: 40 MG/DL (ref 7–30)
CALCIUM SERPL-MCNC: 6.9 MG/DL (ref 8.5–10.1)
CALCIUM SERPL-MCNC: 7.9 MG/DL (ref 8.5–10.1)
CALCIUM SERPL-MCNC: 8 MG/DL (ref 8.5–10.1)
CHLORIDE SERPL-SCNC: 105 MMOL/L (ref 94–109)
CHLORIDE SERPL-SCNC: 106 MMOL/L (ref 94–109)
CHLORIDE SERPL-SCNC: 111 MMOL/L (ref 94–109)
CO2 SERPL-SCNC: 24 MMOL/L (ref 20–32)
CO2 SERPL-SCNC: 24 MMOL/L (ref 20–32)
CO2 SERPL-SCNC: 27 MMOL/L (ref 20–32)
CREAT SERPL-MCNC: 1.29 MG/DL (ref 0.66–1.25)
CREAT SERPL-MCNC: 1.35 MG/DL (ref 0.66–1.25)
CREAT SERPL-MCNC: 1.53 MG/DL (ref 0.66–1.25)
ERYTHROCYTE [DISTWIDTH] IN BLOOD BY AUTOMATED COUNT: 15.9 % (ref 10–15)
ERYTHROCYTE [DISTWIDTH] IN BLOOD BY AUTOMATED COUNT: 16.1 % (ref 10–15)
GFR SERPL CREATININE-BSD FRML MDRD: 46 ML/MIN/{1.73_M2}
GFR SERPL CREATININE-BSD FRML MDRD: 54 ML/MIN/{1.73_M2}
GFR SERPL CREATININE-BSD FRML MDRD: 57 ML/MIN/{1.73_M2}
GLUCOSE BLDC GLUCOMTR-MCNC: 116 MG/DL (ref 70–99)
GLUCOSE BLDC GLUCOMTR-MCNC: 245 MG/DL (ref 70–99)
GLUCOSE BLDC GLUCOMTR-MCNC: 277 MG/DL (ref 70–99)
GLUCOSE BLDC GLUCOMTR-MCNC: 53 MG/DL (ref 70–99)
GLUCOSE BLDC GLUCOMTR-MCNC: 95 MG/DL (ref 70–99)
GLUCOSE BLDC GLUCOMTR-MCNC: 95 MG/DL (ref 70–99)
GLUCOSE SERPL-MCNC: 108 MG/DL (ref 70–99)
GLUCOSE SERPL-MCNC: 250 MG/DL (ref 70–99)
GLUCOSE SERPL-MCNC: 92 MG/DL (ref 70–99)
HCO3 BLDV-SCNC: 25 MMOL/L (ref 21–28)
HCO3 BLDV-SCNC: NORMAL MMOL/L (ref 21–28)
HCT VFR BLD AUTO: 24.1 % (ref 40–53)
HCT VFR BLD AUTO: 27.1 % (ref 40–53)
HGB BLD-MCNC: 7.7 G/DL (ref 13.3–17.7)
HGB BLD-MCNC: 8.4 G/DL (ref 13.3–17.7)
INTERPRETATION ECG - MUSE: NORMAL
MAGNESIUM SERPL-MCNC: 2.3 MG/DL (ref 1.6–2.3)
MCH RBC QN AUTO: 29.6 PG (ref 26.5–33)
MCH RBC QN AUTO: 30.2 PG (ref 26.5–33)
MCHC RBC AUTO-ENTMCNC: 31 G/DL (ref 31.5–36.5)
MCHC RBC AUTO-ENTMCNC: 32 G/DL (ref 31.5–36.5)
MCV RBC AUTO: 95 FL (ref 78–100)
MCV RBC AUTO: 95 FL (ref 78–100)
O2/TOTAL GAS SETTING VFR VENT: ABNORMAL %
O2/TOTAL GAS SETTING VFR VENT: NORMAL %
OXYHGB MFR BLDV: 69 %
OXYHGB MFR BLDV: NORMAL %
PCO2 BLDV: 39 MM HG (ref 40–50)
PCO2 BLDV: NORMAL MM HG (ref 40–50)
PH BLDV: 7.42 PH (ref 7.32–7.43)
PH BLDV: NORMAL PH (ref 7.32–7.43)
PHOSPHATE SERPL-MCNC: 2.5 MG/DL (ref 2.5–4.5)
PLATELET # BLD AUTO: 122 10E9/L (ref 150–450)
PLATELET # BLD AUTO: 144 10E9/L (ref 150–450)
PO2 BLDV: 37 MM HG (ref 25–47)
PO2 BLDV: NORMAL MM HG (ref 25–47)
POTASSIUM SERPL-SCNC: 3.6 MMOL/L (ref 3.4–5.3)
POTASSIUM SERPL-SCNC: 4.1 MMOL/L (ref 3.4–5.3)
POTASSIUM SERPL-SCNC: 4.2 MMOL/L (ref 3.4–5.3)
RBC # BLD AUTO: 2.55 10E12/L (ref 4.4–5.9)
RBC # BLD AUTO: 2.84 10E12/L (ref 4.4–5.9)
SODIUM SERPL-SCNC: 138 MMOL/L (ref 133–144)
SODIUM SERPL-SCNC: 138 MMOL/L (ref 133–144)
SODIUM SERPL-SCNC: 141 MMOL/L (ref 133–144)
SPECIMEN EXP DATE BLD: NORMAL
SPECIMEN SOURCE: NORMAL
WBC # BLD AUTO: 8.5 10E9/L (ref 4–11)
WBC # BLD AUTO: 9.4 10E9/L (ref 4–11)

## 2020-07-24 PROCEDURE — 82805 BLOOD GASES W/O2 SATURATION: CPT | Performed by: PEDIATRICS

## 2020-07-24 PROCEDURE — 97110 THERAPEUTIC EXERCISES: CPT | Mod: GP

## 2020-07-24 PROCEDURE — 25000128 H RX IP 250 OP 636: Performed by: INTERNAL MEDICINE

## 2020-07-24 PROCEDURE — 40000048 ZZH STATISTIC DAILY SWAN MONITORING

## 2020-07-24 PROCEDURE — 97530 THERAPEUTIC ACTIVITIES: CPT | Mod: GO

## 2020-07-24 PROCEDURE — 85730 THROMBOPLASTIN TIME PARTIAL: CPT | Performed by: SURGERY

## 2020-07-24 PROCEDURE — 25000128 H RX IP 250 OP 636: Performed by: STUDENT IN AN ORGANIZED HEALTH CARE EDUCATION/TRAINING PROGRAM

## 2020-07-24 PROCEDURE — 83735 ASSAY OF MAGNESIUM: CPT | Performed by: SURGERY

## 2020-07-24 PROCEDURE — 25000131 ZZH RX MED GY IP 250 OP 636 PS 637: Performed by: STUDENT IN AN ORGANIZED HEALTH CARE EDUCATION/TRAINING PROGRAM

## 2020-07-24 PROCEDURE — 25000125 ZZHC RX 250: Performed by: STUDENT IN AN ORGANIZED HEALTH CARE EDUCATION/TRAINING PROGRAM

## 2020-07-24 PROCEDURE — 85027 COMPLETE CBC AUTOMATED: CPT | Performed by: STUDENT IN AN ORGANIZED HEALTH CARE EDUCATION/TRAINING PROGRAM

## 2020-07-24 PROCEDURE — 25000132 ZZH RX MED GY IP 250 OP 250 PS 637: Performed by: PEDIATRICS

## 2020-07-24 PROCEDURE — 80048 BASIC METABOLIC PNL TOTAL CA: CPT | Performed by: STUDENT IN AN ORGANIZED HEALTH CARE EDUCATION/TRAINING PROGRAM

## 2020-07-24 PROCEDURE — 86850 RBC ANTIBODY SCREEN: CPT | Performed by: SURGERY

## 2020-07-24 PROCEDURE — 85027 COMPLETE CBC AUTOMATED: CPT | Performed by: SURGERY

## 2020-07-24 PROCEDURE — 99233 SBSQ HOSP IP/OBS HIGH 50: CPT | Mod: GC | Performed by: INTERNAL MEDICINE

## 2020-07-24 PROCEDURE — 25000132 ZZH RX MED GY IP 250 OP 250 PS 637: Performed by: STUDENT IN AN ORGANIZED HEALTH CARE EDUCATION/TRAINING PROGRAM

## 2020-07-24 PROCEDURE — 80048 BASIC METABOLIC PNL TOTAL CA: CPT | Performed by: SURGERY

## 2020-07-24 PROCEDURE — 71045 X-RAY EXAM CHEST 1 VIEW: CPT

## 2020-07-24 PROCEDURE — 97530 THERAPEUTIC ACTIVITIES: CPT | Mod: GP

## 2020-07-24 PROCEDURE — 25000132 ZZH RX MED GY IP 250 OP 250 PS 637: Performed by: INTERNAL MEDICINE

## 2020-07-24 PROCEDURE — 40000196 ZZH STATISTIC RAPCV CVP MONITORING

## 2020-07-24 PROCEDURE — 86900 BLOOD TYPING SEROLOGIC ABO: CPT | Performed by: SURGERY

## 2020-07-24 PROCEDURE — 25000132 ZZH RX MED GY IP 250 OP 250 PS 637: Performed by: SURGERY

## 2020-07-24 PROCEDURE — 84100 ASSAY OF PHOSPHORUS: CPT | Performed by: STUDENT IN AN ORGANIZED HEALTH CARE EDUCATION/TRAINING PROGRAM

## 2020-07-24 PROCEDURE — 93005 ELECTROCARDIOGRAM TRACING: CPT

## 2020-07-24 PROCEDURE — 86901 BLOOD TYPING SEROLOGIC RH(D): CPT | Performed by: SURGERY

## 2020-07-24 PROCEDURE — 25000131 ZZH RX MED GY IP 250 OP 636 PS 637: Performed by: INTERNAL MEDICINE

## 2020-07-24 PROCEDURE — 97535 SELF CARE MNGMENT TRAINING: CPT | Mod: GO

## 2020-07-24 PROCEDURE — 25800030 ZZH RX IP 258 OP 636: Performed by: STUDENT IN AN ORGANIZED HEALTH CARE EDUCATION/TRAINING PROGRAM

## 2020-07-24 PROCEDURE — 25800030 ZZH RX IP 258 OP 636: Performed by: INTERNAL MEDICINE

## 2020-07-24 PROCEDURE — 00000146 ZZHCL STATISTIC GLUCOSE BY METER IP

## 2020-07-24 PROCEDURE — 21400000 ZZH R&B CCU UMMC

## 2020-07-24 RX ORDER — VALGANCICLOVIR 450 MG/1
450 TABLET, FILM COATED ORAL DAILY
Status: DISCONTINUED | OUTPATIENT
Start: 2020-07-24 | End: 2020-08-03 | Stop reason: HOSPADM

## 2020-07-24 RX ORDER — TERBUTALINE SULFATE 5 MG/1
10 TABLET ORAL EVERY 6 HOURS SCHEDULED
Status: DISCONTINUED | OUTPATIENT
Start: 2020-07-24 | End: 2020-07-29

## 2020-07-24 RX ORDER — FUROSEMIDE 10 MG/ML
20 INJECTION INTRAMUSCULAR; INTRAVENOUS ONCE
Status: COMPLETED | OUTPATIENT
Start: 2020-07-24 | End: 2020-07-24

## 2020-07-24 RX ORDER — TAMSULOSIN HYDROCHLORIDE 0.4 MG/1
0.4 CAPSULE ORAL DAILY
Status: DISCONTINUED | OUTPATIENT
Start: 2020-07-24 | End: 2020-08-03 | Stop reason: HOSPADM

## 2020-07-24 RX ADMIN — FUROSEMIDE 20 MG: 10 INJECTION, SOLUTION INTRAVENOUS at 09:20

## 2020-07-24 RX ADMIN — TERBUTALINE SULFATE 10 MG: 5 TABLET ORAL at 12:32

## 2020-07-24 RX ADMIN — PREDNISONE 35 MG: 5 TABLET ORAL at 09:19

## 2020-07-24 RX ADMIN — TERBUTALINE SULFATE 10 MG: 5 TABLET ORAL at 18:10

## 2020-07-24 RX ADMIN — METHOCARBAMOL 500 MG: 500 TABLET, FILM COATED ORAL at 15:54

## 2020-07-24 RX ADMIN — ACETAMINOPHEN 975 MG: 325 TABLET, FILM COATED ORAL at 12:32

## 2020-07-24 RX ADMIN — ACETAMINOPHEN 975 MG: 325 TABLET, FILM COATED ORAL at 21:57

## 2020-07-24 RX ADMIN — VALGANCICLOVIR 450 MG: 450 TABLET, FILM COATED ORAL at 12:32

## 2020-07-24 RX ADMIN — NYSTATIN 1000000 UNITS: 100000 SUSPENSION ORAL at 09:20

## 2020-07-24 RX ADMIN — NYSTATIN 1000000 UNITS: 100000 SUSPENSION ORAL at 15:45

## 2020-07-24 RX ADMIN — MYCOPHENOLATE MOFETIL 1500 MG: 250 CAPSULE ORAL at 18:09

## 2020-07-24 RX ADMIN — NYSTATIN 1000000 UNITS: 100000 SUSPENSION ORAL at 12:32

## 2020-07-24 RX ADMIN — PANTOPRAZOLE SODIUM 40 MG: 40 TABLET, DELAYED RELEASE ORAL at 09:19

## 2020-07-24 RX ADMIN — ASPIRIN 81 MG CHEWABLE TABLET 81 MG: 81 TABLET CHEWABLE at 09:19

## 2020-07-24 RX ADMIN — INSULIN ASPART 5 UNITS: 100 INJECTION, SOLUTION INTRAVENOUS; SUBCUTANEOUS at 16:31

## 2020-07-24 RX ADMIN — DOCUSATE SODIUM AND SENNOSIDES 1 TABLET: 8.6; 5 TABLET ORAL at 20:04

## 2020-07-24 RX ADMIN — TAMSULOSIN HYDROCHLORIDE 0.4 MG: 0.4 CAPSULE ORAL at 10:43

## 2020-07-24 RX ADMIN — SODIUM CHLORIDE 20 MG: 9 INJECTION, SOLUTION INTRAVENOUS at 15:03

## 2020-07-24 RX ADMIN — SODIUM PHOSPHATE, MONOBASIC, MONOHYDRATE AND SODIUM PHOSPHATE, DIBASIC, ANHYDROUS 15 MMOL: 276; 142 INJECTION, SOLUTION INTRAVENOUS at 01:19

## 2020-07-24 RX ADMIN — CEFTRIAXONE SODIUM 2 G: 2 INJECTION, POWDER, FOR SOLUTION INTRAMUSCULAR; INTRAVENOUS at 15:45

## 2020-07-24 RX ADMIN — POTASSIUM CHLORIDE 20 MEQ: 750 TABLET, EXTENDED RELEASE ORAL at 15:45

## 2020-07-24 RX ADMIN — MYCOPHENOLATE MOFETIL 1500 MG: 250 CAPSULE ORAL at 09:20

## 2020-07-24 RX ADMIN — PREDNISONE 35 MG: 5 TABLET ORAL at 20:04

## 2020-07-24 RX ADMIN — NYSTATIN 1000000 UNITS: 100000 SUSPENSION ORAL at 20:05

## 2020-07-24 RX ADMIN — ACETAMINOPHEN 975 MG: 325 TABLET, FILM COATED ORAL at 04:13

## 2020-07-24 ASSESSMENT — ACTIVITIES OF DAILY LIVING (ADL)
ADLS_ACUITY_SCORE: 14

## 2020-07-24 ASSESSMENT — PAIN DESCRIPTION - DESCRIPTORS: DESCRIPTORS: ACHING;SORE

## 2020-07-24 ASSESSMENT — MIFFLIN-ST. JEOR: SCORE: 1521

## 2020-07-24 NOTE — PROGRESS NOTES
07/23/20 1735   Quick Adds   Type of Visit Initial Occupational Therapy Evaluation   Living Environment   Lives With spouse   Living Arrangements house   Home Accessibility stairs to enter home   Number of Stairs, Main Entrance 3   Stair Railings, Main Entrance none   Number of Stairs, Within Home, Primary 10   Stair Railings, Within Home, Primary railing on right side (ascending)   Transportation Anticipated family or friend will provide   Living Environment Comment Pt does not need to use stairs inside. Pt has a walk in shower.    Self-Care   Usual Activity Tolerance good   Current Activity Tolerance fair   Regular Exercise Yes   Activity/Exercise Type walking   Exercise Amount/Frequency 3-5 times/wk   Equipment Currently Used at Home none   Functional Level   Ambulation 0-->independent   Transferring 0-->independent   Toileting 0-->independent   Bathing 0-->independent   Dressing 0-->independent   Eating 0-->independent   Communication 0-->understands/communicates without difficulty   Swallowing 0-->swallows foods/liquids without difficulty   Cognition 0 - no cognition issues reported   Fall history within last six months yes   Number of times patient has fallen within last six months 1   Which of the above functional risks had a recent onset or change? ambulation;transferring;toileting;bathing;dressing;fall history   General Information   Onset of Illness/Injury or Date of Surgery - Date 07/03/20   Referring Physician  Giorgio Corral MD     Patient/Family Goals Statement Pt would like to return home independently.    Additional Occupational Profile Info/Pertinent History of Current Problem Lucian Dalearza Sr. is a 64 year old male who underwent OHT on 7/19. 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    Precautions/Limitations fall precautions;sternal precautions   Weight-Bearing Status - LUE   (10lbs)   Weight-Bearing Status -  RUE   (10lbs)   Weight-Bearing Status - LLE full weight-bearing   Weight-Bearing Status - RLE full weight-bearing   Cognitive Status Examination   Orientation orientation to person, place and time   Level of Consciousness alert   Follows Commands (Cognition) WFL   Visual Perception   Visual Perception No deficits were identified   Sensory Examination   Sensory Quick Adds No deficits were identified   Range of Motion (ROM)   ROM Comment ROM limited by precautions.    Strength   Strength Comments Pt demonstrates genralized weakness. Pt has a moderate deficit in BUE  strength.    Mobility   Bed Mobility Comments Mod A x 2 and vc's   Transfer Skills   Transfer Comments Min A x 2 and vc's.    Activities of Daily Living Analysis   Impairments Contributing to Impaired Activities of Daily Living balance impaired;fear and anxiety;flexibility decreased;pain;post surgical precautions;postural control impaired;ROM decreased;strength decreased   General Therapy Interventions   Planned Therapy Interventions ADL retraining;IADL retraining;bed mobility training;ROM;strengthening;stretching;transfer training;home program guidelines;progressive activity/exercise   Clinical Impression   Criteria for Skilled Therapeutic Interventions Met yes, treatment indicated   OT Diagnosis Decreased independence with functional transfers and ADLs.    Influenced by the following impairments Decreased strength, and endurance, Decreased functional mobility.    Assessment of Occupational Performance 3-5 Performance Deficits   Identified Performance Deficits Decreased independence with functional transfers/ADLs/IADLs.    Clinical Decision Making (Complexity) Low complexity   Therapy Frequency Daily   Predicted Duration of Therapy Intervention (days/wks) 8/7/2020   Anticipated Discharge Disposition Transitional Care Facility;Acute Rehabilitation Facility   Risks and Benefits of Treatment have been explained. Yes   Patient, Family & other staff in  "agreement with plan of care Yes   [facility] TM \"6 Clicks\"   2016, Trustees of [facility], under license to [employer].  All rights reserved.   6 Clicks Short Forms Daily Activity Inpatient Short Form   [facility]  \"6 Clicks\" Daily Activity Inpatient Short Form   1. Putting on and taking off regular lower body clothing? 2 - A Lot   2. Bathing (including washing, rinsing, drying)? 2 - A Lot   3. Toileting, which includes using toilet, bedpan or urinal? 1 - Total   4. Putting on and taking off regular upper body clothing? 2 - A Lot   5. Taking care of personal grooming such as brushing teeth? 3 - A Little   6. Eating meals? 4 - None   Daily Activity Raw Score (Score out of 24.Lower scores equate to lower levels of function) 14   Total Evaluation Time   Total Evaluation Time (Minutes) 10     "

## 2020-07-24 NOTE — PLAN OF CARE
Discharge Planner PT   Patient plan for discharge: Not discussed today  Current status: Pt declines  use, no language barrier impact on therapy. Mod-A sit<>stand progresses to CGA with cues. Tolerates standing up to 4 min with FWW.  with activity, SPO2 >95% throughout.   Barriers to return to prior living situation: medical, current mobility  Recommendations for discharge: ARU  Rationale for recommendations:  Pt IND at baseline and will need ongoing skilled PT intervention to improve independence and safety with functional mobility skills prior to returning home. Anticipate pt will tolerate 3+ hour of therapies per day once medically stable. Has support at home.       Entered by: Pablo Jorgensen 07/24/2020 4:59 PM

## 2020-07-24 NOTE — PLAN OF CARE
Discharge Planner OT   Patient plan for discharge: Pt would like to return home independently.   Current status: Evaluation completed and treatment initiated. Therapist educated pt on OT/CR role and discussed POC/Goals for therapy.  present via Ipad. Therapist educated pt on sternal precautions and safety with functional transfers. Pt required Min A x 2 and vc's for transfer sit<->standing pivot transfer to bedside. Pt required Mod A x 2 and vc's for transfer seated EOB->supine inclined in bed. Pt tolerated this activity well though very fatigued. VSS.   Barriers to return to prior living situation: Decreased strength/endurance, Decreased independence with functional transfers/ADLs.   Recommendations for discharge: TCU at this time.   Rationale for recommendations: Pt will benefit from continued therapy to address barriers above and to maximize functional independence.        Entered by: Lizandro Multani 07/24/2020 12:19 AM

## 2020-07-24 NOTE — PROGRESS NOTES
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 is a 64 year old male with PMH T2DM, HTN, CKD, CHANTEL, ICM with severe LV dysfunction, s/p 3V CABG (2008), ICD (2015), S/p Heart Transplant on 7/20 with Dr. Griselli.     TODAY'S PROGRESS:   - Wean isoproterenol   - Start terbutaline 10 mg Q8h  - Diuresis per cards  - Continue lantus 40 units BID, carb coverage and sliding scale insulin  - Remove MAC and internal jugular  - Valcyte to start today   - Transfer to     PLAN:  Neuro/ pain/ sedation:  #Acute post operative pain  - Monitor neurological status. Notify the MD for any acute changes in exam  - tylenol, oxy/dilaudid/robaxin PRN     Pulmonary care:   #CHANTEL  - Extubated 7/22  - 2L NC  - Titrate FiO2 for SpO2 >92%  - IS, pulmonary hygiene      Cardiovascular:    #ICM with sever LV dysfunction s/p heart transplant 7/20  #S/p 3V CABG (2008)  #VT/VF arrhythmia s/p ICD (2015)  #Atrial flutter with RVR  #HTN/HLD  #IABP (subclavian) removed 7/22  - Monitor hemodynamic status  - MAP >65  - Wean isoproterenol   - Start terbutaline 10 mg Q8h  - Aspirin 81 mg   - Hold PTA atorvastatin 40 mg, plavix 75 mg, lasix 20 mg BID  - Hold PTA hydralazine 25 mg TID, losartan 50 mg     GI/Nutrition:   - Regular diet  - Bowel regimen      Fluids/ Electrolytes/Renal:   #CKD, baseline Cr 1.6  #Hypokalemia, resolved   - TKO for IV fluid hydration   - Urine output is adequate so far  - Will continue to  monitor intake and output.  - Cr 1.35 (1.31)  - Replete electrolytes PRN      Endocrine:    #T2DM  # Stress hyperglycemia  - lantus 40 units BID, carb coverage and sliding scale insulin  - PTA exenatide 2mg Q7days, glimepiride 4 mg, Lantus 8 units in the morning and 40 units in the evening     ID/ Antibiotics:  - Afebrile, WBC 8.5 11.1)  - Donor BCX growing: Strep salivarius, likely contaminate donor lungs growing pseudomonas  - Transplant ID signed off 7/22   - Completed Vancomycin (3 day course)  - Will complete 7 days of ceftriaxone which will end on 7/24   - Valcyte       Immunosuppression:  -Cellcept, prednisone     Heme:     #Thrombocytopenin  #HIT positive, CORRINE positive  #Occlusive thrombus in superficial R cephalic vein  - Hgb goal >7  - Bival   - Hematology following      Prophylaxis:    - Mechanical prophylaxis for DVT  - Bival   - Protonix qd     Lines/ tubes/ drains:   PIV (7/20), CT     Disposition:  - CV ICU     Patient seen, findings and plan discussed with CV ICU staff, Dr. Alisia Vega MD (CA2)  Anesthesiology Resident  *[id number]    ====================================    SUBJECTIVE:   No acute overnight events. Patient is doing well this morning.     OBJECTIVE:   1. VITAL SIGNS:   Temp:  [98.2  F (36.8  C)-98.6  F (37  C)] 98.4  F (36.9  C)  Pulse:  [] 96  Heart Rate:  [] 95  Resp:  [12-20] 17  BP: (120-135)/() 134/59  MAP:  [66 mmHg-89 mmHg] 82 mmHg  Arterial Line BP: (105-136)/(47-66) 128/56  SpO2:  [94 %-100 %] 98 %  FiO2 (%): 30 %  Resp: 17      2. INTAKE/ OUTPUT:   I/O last 3 completed shifts:  In: 918.59 [P.O.:240; I.V.:678.59]  Out: 2405 [Urine:2145; Chest Tube:260]    3. PHYSICAL EXAMINATION:   General: Sitting up in chair  Neuro: A&Ox3, NAD  Resp: Breathing non-labored on 2L NC  CV: SR @95, Pleural and mediastinal CTs  Abdomen: Soft, Non-distended, Non-tender  Extremities: warm and well perfused    4. INVESTIGATIONS:   Arterial Blood Gases   Recent Labs   Lab  07/23/20  0404 07/22/20 2122 07/22/20  1608 07/22/20  1206   PH 7.47* 7.44 7.44 7.43   PCO2 37 38 39 32*   PO2 104 111* 80 103   HCO3 27 26 27 22     Complete Blood Count   Recent Labs   Lab 07/24/20 0411 07/23/20 2109 07/23/20  1140 07/23/20  0411   WBC 8.5 11.1* 18.3* 19.1*   HGB 7.7* 7.9* 8.4* 8.0*   * 119* 126* 116*     Basic Metabolic Panel  Recent Labs   Lab 07/24/20 0411 07/23/20 2109 07/23/20  1630 07/23/20  1140    137 135 135   POTASSIUM 4.2 4.3 4.8 5.0   CHLORIDE 106 106 109 106   CO2 27 28 23 23   BUN 40* 41* 41* 41*   CR 1.35* 1.31* 1.36* 1.36*   GLC 92 145* 244* 336*     Liver Function Tests  Recent Labs   Lab 07/22/20 0414 07/21/20 0422 07/20/20  1610 07/20/20  0506 07/20/20  0232 07/20/20  0018  07/19/20  1613   AST 28 72*  --   --  108*  --   --  24   ALT 25 34  --   --  39  --   --  21   ALKPHOS 75 67  --   --  69  --   --  113   BILITOTAL 0.4 1.0  --   --  1.8*  --   --  0.6   ALBUMIN 2.4* 2.9*  --   --  2.8*  --   --  2.6*   INR  --   --  1.35* 1.35* 1.52* 1.59*   < > 1.18*    < > = values in this interval not displayed.     Pancreatic Enzymes  Recent Labs   Lab 07/19/20 1613   AMYLASE 36     Coagulation Profile  Recent Labs   Lab 07/24/20 0411 07/23/20 1739 07/23/20 0411 07/22/20 2122 07/20/20  1610 07/20/20  0506 07/20/20  0232 07/20/20  0018   INR  --   --   --   --   --  1.35* 1.35* 1.52* 1.59*   PTT 61* 55* 64* 59*   < >  --  37 36 30    < > = values in this interval not displayed.         5. RADIOLOGY:   Recent Results (from the past 24 hour(s))   XR Chest Port 1 View    Narrative    Exam: XR CHEST PORT 1 VW, 7/24/2020 1:00 AM    Indication: interval post op CXR    Comparison: 7/23/2020    Findings: Single portable chest radiograph. Interval removal of right  upper extremity PICC. Right [place] catheter terminating over the  right pulmonary artery. Right basilar chest tube and mediastinal  drains are in stable position. Median sternotomy wires. Trachea  is  midline. Heart size is within normal limits. Streaky basilar  opacities. No pneumothorax. No pleural effusion.      Impression    Impression:   1. Interval removal of right upper extremity PICC. Additional support  devices are unchanged.  2. Mild bibasilar opacities, likely atelectasis.    I have personally reviewed the examination and initial interpretation  and I agree with the findings.    HARI RIZO MD       =========================================

## 2020-07-24 NOTE — PROGRESS NOTES
[facility]   Cardiology II Service / Advanced Heart Failure  Progress note    Lucian Morillo  : 1955  MRN # [id number]    ADMIT DATE: 7/3/2020    Changes today  - Start terbutaline 10 mg Q8h, wean off isoproterenol   - Lasix 20 mg IV once, goal CVP 10-12  - 2nd dose of Simulect today   - Start tacro tomorrow    - Start valcyte today and bactrim (~) pending renal fxn   - Bivalirudin for HIT, will transition to fondaparinux in coming days once stable from bleeding standpoint      ASSESSMENT & PLAN:  Lucian Henderson Sr. is a 64 year old male with a PMH of HTN, DMII, obesity, CKD, CHANTEL, ischemic cardiomyopathy and severe LV systolic dysfunction s/p 3V CABG () and primary prevention ICD ( 4/2/15). Currently he presents with 3 days of worsening fatigue and SOB with minimal exertion. Patient claims he could walk 1-2 blocks last week but has been barely able to ambulate home recently.     Admitted for possible LVAD/Transplant work up. IABP and listed status 2 for heart transplant . HIT + , underwent PLEX, s/p . Post operative course complicated by graft dysfunction and VT. Graft function back to near normal , LVEF 50-55%.      Date RA MPAP PCWP SV02 Jacy CO Jacy CI MAP SVR PVR Intervention   20 8 50 38 49 2.3 1.28 89 2400 5.2    20  6 48 28 35 4.0 2.1 69 1300 2.2 Nipride, PO afterload reduction    7/10 9 56/34 32 68 5.1 2.7 68 900 1.8 Nipride 1, PO afterload reduction    14 65/42/50 36 60 4.3 2.2 89 1100 1.8 Nipride 0.25, PO afterload reduction    7/15 8 52/28/36 24 66 4.1 2.2 100 1200 2.9 IABP 1:1, PO afterload reduction     7 50/24/33 24 70 5.6 3.0 94 1200 1.6 IABP 1:1, Dopamine 2.5    9 56/24/35 22 62 4.8 2.6 75 1100 2.7 IABP 1:1    15  16 58 5.2 2.8 61 746 1.9 IABP 1:1, Epi 0.05, NE 0.03, Vas    10  16 72 6.0 3.5 71 748 0.83 IABP 1;1, Epi 0.05, NE 0.05    11  16 74 7.4 3.9 71 700 0.95 IABP  1:1, epi 0.02, NE 0.02   7/23 10 36/18/24 18 65 6.9 3.6 82 1100 0.87 IABP 1:1, epi 0.02, NE 0.01   7/24 7 34/18/23 16 69 7.5 3.9 75 800 0.93 No pressors        # ICM s/p 3V CABG (2008) and primary prevention ICD ( 4/2/15)  # HFrEF Stage D, NYHA III-IV s/p OHT 7/19  # Ambulatory cardiogenic shock  # Arrhythmia: HF associated VT/VF, VT (7/20, post op from OHT)   # Graft dysfunction (EF 20-25%)   Graft Function: TTE POD#1 w/ LVEF 20-25%, RV severely reduced, suspect 2/2 to ischemic time, minimal change in LV fxn 7/21, consider IABP wean tomorrow    --Volume: slightly volume up (goal CVP 10-12), lasix PRN   --BP: MAP goal >65   --HR: Goal >100,   -->isopreterenol at 0.01, added terbutaline 10 mg PO Q6H 7/24    Pressors/Inotropes:  -continue NE and epi, wean   -NO at 20, wean off 7/22    Immunosuppression:  -s/p Simulect on POD#0, second dose POD#4 (7/24)  -MMF 1500 mg BID  -s/p solumedrol 125 mg q8h  --> transitioned to prednisone  w/ taper   -will start Tacro 7/25  -routine endomyocardial bx 7 days post transplant (7/27)      Antibiotics:   - vancomycin (will get 3 days)  - cefepime -> ceftriaxone 7/21  - Nystatin Swish&Swallow  - start bactrim in coming days   - CMV: R+ / D+, start valcyte 450 mg qday 7/24  - EBV: R+ / D+  - f/u blood cx, NGTD   - donor blood cx growing strep salivarius in 1/2 bottles  -->transplant ID consult 7/21, rec narrow to ceftriaxone     # Atrial Flutter with RVR  Patient went into Atrial flutter with RVR with rates around 150 overnight on 7/7/2020 at around 2 am. Converted to NSR with amiodarone, which was stopped 7/19 given OHT.     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 Terrazas.     Héctor Smith MD   Cardiology Fellow, PGY-5  [phone]  "  ..........................................................................................................................................................  Subjective:  SARITA overnight. No arrhythmias. Extubated. Denies any SOB, n/v or abdominal pain.     PHYSICAL EXAM:  /51   Pulse 94   Temp 98.4  F (36.9  C) (Pulmonary Artery)   Resp 16   Ht 1.626 m (5' 4\")   Wt 83 kg (182 lb 15.7 oz)   SpO2 100%   BMI 31.41 kg/m    GENERAL: NAD  NECK: Supple and without lymphadenopathy. JVP 11 cm  CV: S1/S2 heard without murmur   RESPIRATORY: CTAB  GI: Soft and distended. No tenderness, rebound, guarding. No palpable organomegaly.   EXTREMITIES: Peripheral edema trace.   NEUROLOGIC: Follows commands, moves all four extremities   MUSCULOSKELETAL: No joint swelling or tenderness.   SKIN: No jaundice. No acute rashes or lesions.     ROS:   12-pt ROS otherwise negative except as noted above    PMH:  Past Medical History:   Diagnosis Date     CAD (coronary artery disease)      CHF (congestive heart failure) (H)      CKD (chronic kidney disease), stage III (H)      Cortical cataract of both eyes      Diabetes (H)      Hyperlipidemia      Hypertension      Ischemic cardiomyopathy      Obesity      CHANTEL (obstructive sleep apnea)     occas cpap     Osteoarthritis        PSH:  Past Surgical History:   Procedure Laterality Date     C CABG, ARTERY-VEIN, THREE  02/2008     CATARACT IOL, RT/LT       COLONOSCOPY N/A 8/7/2019    Procedure: COLONOSCOPY, WITH POLYPECTOMY AND BIOPSY;  Surgeon: Chauncey Morataya MD;  Location:  GI     CV RIGHT HEART CATH N/A 3/25/2019    Procedure: CV RIGHT HEART CATH;  Surgeon: Moises Santos MD;  Location:  HEART CARDIAC CATH LAB     CV RIGHT HEART CATH N/A 7/10/2019    Procedure: CV RIGHT HEART CATH;  Surgeon: Jak Mccabe MD;  Location:  HEART CARDIAC CATH LAB     CV RIGHT HEART CATH N/A 7/8/2020    Procedure: Right Heart Cath with Leave In [place] already has ICU load;  Surgeon: Estelle, " Jak Barclay MD;  Location:  HEART CARDIAC CATH LAB     EXTRACTION(S) DENTAL Left 7/13/2020    Procedure: EXTRACTION, TOOTH #11, 12, 13, 15, and 29;  Surgeon: Monica Chao DDS;  Location:  OR     IMPLANT AUTOMATIC IMPLANTABLE CARDIOVERTER DEFIBRILLATOR       INSERT INTRAAORTIC BALLOON PUMP N/A 7/16/2020    Procedure: Subclavian Intra-Aortic Balloon Pump Placement;  Surgeon: Allan Sparrow MD;  Location: [facility] OR     PHACOEMULSIFICATION CLEAR CORNEA WITH STANDARD IOL, VITRECTOMY PARSPLANA 25 GAGUE, COMBINED Left 12/10/2019    Procedure: PHACOEMULSIFICATION, CATARACT, CLEAR CORNEAL INCISION APPROACH, W STD INTRAOCULAR LENS IMPLANT INSERT + VITRECTOMY BY PARS PLANA  USING 25-GAUGE INSTRUMENTS. ENDOLASER, INFUSION OF 20% SF6 GAS;  Surgeon: Triny Bunch MD;  Location:  OR     PICC INSERTION Right 07/11/2020    basilic 44 cm total      TRANSPLANT HEART RECIPIENT N/A 7/19/2020    Procedure: REDO MEDIAN STERNOTOMY, TRANSPLANT, ORTHOTOPIC HEART, RECIPIENT, ON PUMP OXYGENATOR, REMOVAL OF CARDIAC DEFIBRILLATOR AND LEAD;  Surgeon: Griselli, Massimo, MD;  Location:  OR       MEDICATIONS:  Prior to Admission Medications   Prescriptions Last Dose Informant Patient Reported? Taking?   aspirin 81 MG tablet  Self No No   Sig: Take 1 tablet (81 mg) by mouth daily   atorvastatin (LIPITOR) 40 MG tablet  Self No No   Sig: Take 1 tablet (40 mg) by mouth daily   blood glucose (ACCU-CHEK SMARTVIEW) test strip  Self No No   Sig: USE TO TEST THREE TIMES DAILY AS DIRECTED   blood glucose (NO BRAND SPECIFIED) test strip  Self No No   Sig: Use to test blood sugar 2 times daily or as directed.   blood glucose monitoring (ACCU-CHEK FELIPE SMARTVIEW) meter device kit  Self No No   Sig: Use to test blood sugar 3-4 times daily, as directed.   blood glucose monitoring (ONE TOUCH DELICA) lancets  Self No No   Sig: Use to test blood sugars 2 times daily.   clopidogrel (PLAVIX) 75 MG tablet  Self No No   Sig: Take 1 tablet (75 mg) by  mouth daily   exenatide ER (BYDUREON) 2 MG pen  Self No No   Sig: Inject 2 mg Subcutaneous every 7 days   furosemide (LASIX) 20 MG tablet  Self No No   Sig: Take 2 tablets (40 mg) by mouth 2 times daily   glimepiride (AMARYL) 4 MG tablet  Self No No   Sig: Take 1 tablet (4 mg) by mouth every morning (before breakfast)   hydrALAZINE (APRESOLINE) 25 MG tablet  Self No No   Sig: Take 1 tablet (25 mg) by mouth 3 times daily   insulin glargine (LANTUS SOLOSTAR) 100 UNIT/ML pen  Self No No   Sig: Take 8 units in the morning and 40 units in the evening   insulin pen needle (B-D U/F) 31G X 5 MM miscellaneous  Self No No   Si Units by Device route 2 times daily Use 2 pen needles daily or as directed.   isosorbide mononitrate (IMDUR) 60 MG 24 hr tablet  Self No No   Sig: Take 1 tablet (60 mg) by mouth daily   losartan (COZAAR) 50 MG tablet  Spouse/Significant Other No No   Sig: Take 1 tablet (50 mg) by mouth daily   nitroglycerin (NITROSTAT) 0.4 MG SL tablet  Self No No   Sig: Place 1 tablet (0.4 mg) under the tongue every 5 minutes as needed for chest pain If you are still having symptoms after 3 doses (15 minutes) call 911.   order for DME  Self No No   Sig: Auto CPAP 8-15 cmH20      Facility-Administered Medications Last Administration Doses Remaining   erythromycin (ROMYCIN) ophthalmic ointment None recorded    lidocaine (PF) (XYLOCAINE) 2 % injection 10 mL None recorded 1           ALLERGIES:     Allergies   Allergen Reactions     Heparin Heparin Induced Thrombocytopenia     HIT screen sent 2020. CORRINE confirmed positive     No Known Drug Allergies        FAMILY HISTORY:  Family History   Problem Relation Age of Onset     Diabetes Brother      Diabetes Sister      Diabetes Sister      Macular Degeneration No family hx of      Glaucoma No family hx of      Myocardial Infarction No family hx of      Kidney Disease No family hx of        SOCIAL HISTORY:  Social History     Socioeconomic History     Marital status:       Spouse name: Not on file     Number of children: 4     Years of education: Not on file     Highest education level: Not on file   Occupational History     Occupation:      Employer: [employer]     Employer: RETIRED   Social Needs     Financial resource strain: Not on file     Food insecurity     Worry: Not on file     Inability: Not on file     Transportation needs     Medical: Not on file     Non-medical: Not on file   Tobacco Use     Smoking status: Never Smoker     Smokeless tobacco: Never Used     Tobacco comment: Never smoked; non-smoking household   Substance and Sexual Activity     Alcohol use: No     Alcohol/week: 0.0 standard drinks     Drug use: No     Sexual activity: Yes     Partners: Female   Lifestyle     Physical activity     Days per week: Not on file     Minutes per session: Not on file     Stress: Not on file   Relationships     Social connections     Talks on phone: Not on file     Gets together: Not on file     Attends Tenriism service: Not on file     Active member of club or organization: Not on file     Attends meetings of clubs or organizations: Not on file     Relationship status: Not on file     Intimate partner violence     Fear of current or ex partner: Not on file     Emotionally abused: Not on file     Physically abused: Not on file     Forced sexual activity: Not on file   Other Topics Concern     Parent/sibling w/ CABG, MI or angioplasty before 65F 55M? No   Social History Narrative     Not on file       LABS:  BMP  Recent Labs   Lab 07/24/20  0411 07/23/20  2109 07/23/20  1630 07/23/20  1140 07/23/20  0411 07/22/20  1608 07/22/20  0414    137 135 135 137 140 142   POTASSIUM 4.2 4.3 4.8 5.0 4.8 4.9 4.6   CHLORIDE 106 106 109 106 107 110* 110*   CO2 27 28 23 23 27 25 27   BUN 40* 41* 41* 41* 39* 40* 38*   CR 1.35* 1.31* 1.36* 1.36* 1.32* 1.47* 1.54*   GLC 92 145* 244* 336* 103* 134* 121*   BRYANNA 8.0* 8.1* 7.6* 7.6* 8.3* 8.1* 8.4*   PHOS  --  2.4*  --    --  2.7 2.8 4.0     CBC  Recent Labs   Lab 07/24/20  0411 07/23/20  2109 07/23/20  1140 07/23/20  0411  07/19/20  1613   WBC 8.5 11.1* 18.3* 19.1*   < > 7.7   HGB 7.7* 7.9* 8.4* 8.0*   < > 10.8*   HCT 24.1* 24.7* 26.6* 25.0*   < > 34.0*   MCV 95 95 96 95   < > 96   * 119* 126* 116*   < > 99*   LYMPH  --   --   --   --   --  7.9    < > = values in this interval not displayed.     INR  Recent Labs   Lab 07/24/20  0411 07/23/20  1739 07/23/20  0411 07/22/20  2122  07/20/20  1610 07/20/20  0506 07/20/20  0232 07/20/20  0018   INR  --   --   --   --   --  1.35* 1.35* 1.52* 1.59*   PTT 61* 55* 64* 59*   < >  --  37 36 30    < > = values in this interval not displayed.     IMAGING:    RHC 6/18/20     RA 20/26/18  RV 85/28  PA 82/45/58  PCWP 45  Cardiac output by Jacy: 3.85 L/min (indexed to 2.09 L/min/m2)  Cardiac output by thermodilution: 3.63 L/min (indexed to 1.97 L/min/m2)  SVR (by TD CO): 1123  PVR (by TD CO): 7.4    Angiogram 6/18/20   3 vessel CAD s/p 3V CABG in 2008 (LIMA-LAD, SVG-OM1, SVG-RPDA)  2. Known proximal SVG-RPDA occlusion  3. Presumed occlusion of SVG-OM1 (unable to locate on non-selective aortic root shot)  4. Patent LIMA-LAD with collateralization of LAD to PDA    Echocardiogram ( 3/11/2019)   Moderate to severe left ventricular dilation is present.  Severely (EF 10-20%) reduced left ventricular function is present.  No significant valve dysfunction.  Compared to study done 6/5/17 there is no significant change in LV size and  systolic function    Echocardiogram ( 7/5/2020)   Interpretation Summary  Severely (EF 10-20%) reduced left ventricular function is present. LVEF 15%  based on biplane 2D tracing. Severe left ventricular dilation is present.  LVIDd:6.8 cm. Grade III or advanced diastolic dysfunction.  The right ventricle is normal size.  Global right ventricular function is moderately reduced.  No significant valvular abnormalities were noted.  IVC diameter and respiratory changes fall  into an intermediate range  suggesting an RA pressure of 8 mmHg.  Mild pulmonary hypertension is present.     This study was compared with the study from 3/25/2019. RV function has  worsened, LV size and function appear similar.  Devices  ICD ([employer]- 4/2/2015 )     I have reviewed today's vital signs, notes, medications, labs and imaging.  I have also seen and examined the patient and agree with the findings and plan as outlined above.  Pt without complaints.  VSS with CVP 7, PCW 16 and CI 2.4. Lungs clear and tachy S1 and S2.  Labs ith Cr 1.35 and WBC 8.  Assessment: Pt with OHT now POD #4 will receive simulect.  Plan to transition from isopril to terbutaline.  Will begin valcyte.  Plan for tac tomorrow pending Cr and Bx on Monday.  Pt seen X2 for total critical care time 30 min.     James Terrazas MD, PhD  Professor, Heart Failure and Cardiac Transplantation  [facility]

## 2020-07-24 NOTE — PROGRESS NOTES
07/23/20 1735   Quick Adds   Type of Visit Initial Occupational Therapy Evaluation   Living Environment   Lives With spouse   Living Arrangements house   Home Accessibility stairs to enter home   Number of Stairs, Main Entrance 3   Stair Railings, Main Entrance none   Number of Stairs, Within Home, Primary 10   Stair Railings, Within Home, Primary railing on right side (ascending)   Transportation Anticipated family or friend will provide   Living Environment Comment Pt does not need to use stairs inside. Pt has a walk in shower.    Self-Care   Usual Activity Tolerance good   Current Activity Tolerance fair   Regular Exercise Yes   Activity/Exercise Type walking   Exercise Amount/Frequency 3-5 times/wk   Equipment Currently Used at Home none   Functional Level   Ambulation 0-->independent   Transferring 0-->independent   Toileting 0-->independent   Bathing 0-->independent   Dressing 0-->independent   Eating 0-->independent   Communication 0-->understands/communicates without difficulty   Swallowing 0-->swallows foods/liquids without difficulty   Cognition 0 - no cognition issues reported   Fall history within last six months yes   Number of times patient has fallen within last six months 1   Which of the above functional risks had a recent onset or change? ambulation;transferring;toileting;bathing;dressing;fall history   General Information   Onset of Illness/Injury or Date of Surgery - Date 07/03/20   Referring Physician  Giorgio Corral MD     Patient/Family Goals Statement Pt would like to return home independently.    Additional Occupational Profile Info/Pertinent History of Current Problem Lucian Dalearza Sr. is a 64 year old male who underwent OHT on 7/19. 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    Precautions/Limitations fall precautions;sternal precautions   Weight-Bearing Status - LUE   (10lbs)   Weight-Bearing Status -  RUE   (10lbs)   Weight-Bearing Status - LLE full weight-bearing   Weight-Bearing Status - RLE full weight-bearing   Cognitive Status Examination   Orientation orientation to person, place and time   Level of Consciousness alert   Follows Commands (Cognition) WFL   Visual Perception   Visual Perception No deficits were identified   Sensory Examination   Sensory Quick Adds No deficits were identified   Range of Motion (ROM)   ROM Comment ROM limited by precautions.    Strength   Strength Comments Pt demonstrates genralized weakness. Pt has a moderate deficit in BUE  strength.    Mobility   Bed Mobility Comments Mod A x 2 and vc's   Transfer Skills   Transfer Comments Min A x 2 and vc's.    Activities of Daily Living Analysis   Impairments Contributing to Impaired Activities of Daily Living balance impaired;fear and anxiety;flexibility decreased;pain;post surgical precautions;postural control impaired;ROM decreased;strength decreased   General Therapy Interventions   Planned Therapy Interventions ADL retraining;IADL retraining;bed mobility training;ROM;strengthening;stretching;transfer training;home program guidelines;progressive activity/exercise   Clinical Impression   Criteria for Skilled Therapeutic Interventions Met yes, treatment indicated   OT Diagnosis Decreased independence with functional transfers and ADLs.    Influenced by the following impairments Decreased strength, and endurance, Decreased functional mobility.    Assessment of Occupational Performance 3-5 Performance Deficits   Identified Performance Deficits Decreased independence with functional transfers/ADLs/IADLs.    Clinical Decision Making (Complexity) Low complexity   Therapy Frequency Daily   Predicted Duration of Therapy Intervention (days/wks) 8/7/2020   Anticipated Discharge Disposition Transitional Care Facility;Acute Rehabilitation Facility   Risks and Benefits of Treatment have been explained. Yes   Patient, Family & other staff in  "agreement with plan of care Yes   [facility] TM \"6 Clicks\"   2016, Trustees of [facility], under license to [employer].  All rights reserved.   6 Clicks Short Forms Daily Activity Inpatient Short Form   [facility]  \"6 Clicks\" Daily Activity Inpatient Short Form   1. Putting on and taking off regular lower body clothing? 2 - A Lot   2. Bathing (including washing, rinsing, drying)? 2 - A Lot   3. Toileting, which includes using toilet, bedpan or urinal? 1 - Total   4. Putting on and taking off regular upper body clothing? 2 - A Lot   5. Taking care of personal grooming such as brushing teeth? 3 - A Little   6. Eating meals? 4 - None   Daily Activity Raw Score (Score out of 24.Lower scores equate to lower levels of function) 14   Total Evaluation Time   Total Evaluation Time (Minutes) 10     "

## 2020-07-24 NOTE — PROGRESS NOTES
07/23/20 1045   Quick Adds   Type of Visit PT Re-evaluation   Living Environment   Living Environment Comment Please see PT eval dated 7/17 for all PLOF, home set up, etc   General Information   Patient/Family Goals Statement be able to move around well   Pertinent History of Current Problem (include personal factors and/or comorbidities that impact the POC) 64 year old male with PMH T2DM, HTN, CKD, CHANTEL, ICM with severe LV dysfunction, s/p 3V CABG (2008), ICD (2015), S/p Heart Transplant on 7/20 with Dr. Griselli.    Precautions/Limitations sternal precautions;oxygen therapy device and L/min   Weight-Bearing Status - LUE touch down weight-bearing  (10#, no push/pull/lift, B abd or reciprocal UE ex)   Weight-Bearing Status - RUE touch down weight-bearing   Heart Disease Risk Factors Medical history   General Info Comments Pt very agreeable to PT eval, supportive wife present throughout   Cognitive Status Examination   Orientation orientation to person, place and time   Level of Consciousness alert   Follows Commands and Answers Questions 100% of the time   Personal Safety and Judgment intact   Memory intact   Range of Motion (ROM)   ROM Comment B mildly decreased due to postop bedrest, less activity, within expectations   Strength   Strength Comments B mildly decreased due to postop bedrest, less activity, within expectations   Bed Mobility   Bed Mobility Comments Mod/max A sit>supine   Transfer Skills   Transfer Comments mod A   Gait   Gait Comments mod A wh walker   General Therapy Interventions   Planned Therapy Interventions balance training;bed mobility training;gait training;ROM;strengthening;transfer training;risk factor education;home program guidelines;progressive activity/exercise   Clinical Impression   Criteria for Skilled Therapeutic Intervention yes, treatment indicated   PT Diagnosis impaired functional mobility   Influenced by the following impairments decreased strength, ROM, act sammie, increased  "preautions   Functional limitations due to impairments decreased I wtih transfers, gait, bed mob, barrier navigation, ADLs   Clinical Presentation Evolving/Changing   Clinical Decision Making (Complexity) Low complexity   Therapy Frequency Daily   Anticipated Discharge Disposition Transitional Care Facility   Risk & Benefits of therapy have been explained Yes   Patient, Family & other staff in agreement with plan of care Yes   [facility] AM-PAC  \"6 Clicks\" V.2 Basic Mobility Inpatient Short Form   1. Turning from your back to your side while in a flat bed without using bedrails? 3 - A Little   2. Moving from lying on your back to sitting on the side of a flat bed without using bedrails? 2 - A Lot   3. Moving to and from a bed to a chair (including a wheelchair)? 2 - A Lot   4. Standing up from a chair using your arms (e.g., wheelchair, or bedside chair)? 2 - A Lot   5. To walk in hospital room? 2 - A Lot   6. Climbing 3-5 steps with a railing? 1 - Total   Basic Mobility Raw Score (Score out of 24.Lower scores equate to lower levels of function) 12     "

## 2020-07-24 NOTE — PLAN OF CARE
Major Shift Events: HR maintained 93-98 all night. Frequent PACs and PVCs. MD notified, electrolytes checked, EKG obtained. Phos replaced. Temp pacer kept AAI @ 90. No UOP at 0100, bladder scan showed 410, straight cath 475 mL total. Plan to bladder scan again this AM. CVP 8, PA 35/16, SVR 800s, CI 3.9, CO 7.5.

## 2020-07-24 NOTE — PLAN OF CARE
Neuro in intact. Roboxin given for pain. Afebrile. Up to chair 2x with therapy. Assist of 1. Isopril off. Sinus rhythm 90-100s. Normotensive. Room air, lungs clear. Good appetite. Spontaneously voiding. Minimal chest tube output.     Report given to RN on 6C. Notify MD with any concerns.

## 2020-07-25 ENCOUNTER — APPOINTMENT (OUTPATIENT)
Dept: PHYSICAL THERAPY | Facility: CLINIC | Age: 65
DRG: 001 | End: 2020-07-25
Attending: INTERNAL MEDICINE
Payer: COMMERCIAL

## 2020-07-25 ENCOUNTER — APPOINTMENT (OUTPATIENT)
Dept: GENERAL RADIOLOGY | Facility: CLINIC | Age: 65
DRG: 001 | End: 2020-07-25
Attending: INTERNAL MEDICINE
Payer: COMMERCIAL

## 2020-07-25 LAB
ALBUMIN SERPL-MCNC: 2.2 G/DL (ref 3.4–5)
ALP SERPL-CCNC: 82 U/L (ref 40–150)
ALT SERPL W P-5'-P-CCNC: 21 U/L (ref 0–70)
ANION GAP SERPL CALCULATED.3IONS-SCNC: 6 MMOL/L (ref 3–14)
APTT PPP: 49 SEC (ref 22–37)
APTT PPP: 95 SEC (ref 22–37)
AST SERPL W P-5'-P-CCNC: 14 U/L (ref 0–45)
BILIRUB DIRECT SERPL-MCNC: 0.1 MG/DL (ref 0–0.2)
BILIRUB SERPL-MCNC: 0.4 MG/DL (ref 0.2–1.3)
BUN SERPL-MCNC: 41 MG/DL (ref 7–30)
CALCIUM SERPL-MCNC: 7.7 MG/DL (ref 8.5–10.1)
CHLORIDE SERPL-SCNC: 108 MMOL/L (ref 94–109)
CO2 SERPL-SCNC: 24 MMOL/L (ref 20–32)
CREAT SERPL-MCNC: 1.4 MG/DL (ref 0.66–1.25)
ERYTHROCYTE [DISTWIDTH] IN BLOOD BY AUTOMATED COUNT: 16 % (ref 10–15)
GFR SERPL CREATININE-BSD FRML MDRD: 52 ML/MIN/{1.73_M2}
GLUCOSE BLDC GLUCOMTR-MCNC: 155 MG/DL (ref 70–99)
GLUCOSE BLDC GLUCOMTR-MCNC: 229 MG/DL (ref 70–99)
GLUCOSE BLDC GLUCOMTR-MCNC: 250 MG/DL (ref 70–99)
GLUCOSE BLDC GLUCOMTR-MCNC: 260 MG/DL (ref 70–99)
GLUCOSE BLDC GLUCOMTR-MCNC: 284 MG/DL (ref 70–99)
GLUCOSE SERPL-MCNC: 245 MG/DL (ref 70–99)
HCT VFR BLD AUTO: 24.7 % (ref 40–53)
HGB BLD-MCNC: 7.7 G/DL (ref 13.3–17.7)
MCH RBC QN AUTO: 29.8 PG (ref 26.5–33)
MCHC RBC AUTO-ENTMCNC: 31.2 G/DL (ref 31.5–36.5)
MCV RBC AUTO: 96 FL (ref 78–100)
PLATELET # BLD AUTO: 172 10E9/L (ref 150–450)
POTASSIUM SERPL-SCNC: 4.4 MMOL/L (ref 3.4–5.3)
PROT SERPL-MCNC: 5.4 G/DL (ref 6.8–8.8)
RBC # BLD AUTO: 2.58 10E12/L (ref 4.4–5.9)
SODIUM SERPL-SCNC: 138 MMOL/L (ref 133–144)
WBC # BLD AUTO: 6.6 10E9/L (ref 4–11)

## 2020-07-25 PROCEDURE — 25000132 ZZH RX MED GY IP 250 OP 250 PS 637: Performed by: STUDENT IN AN ORGANIZED HEALTH CARE EDUCATION/TRAINING PROGRAM

## 2020-07-25 PROCEDURE — 36415 COLL VENOUS BLD VENIPUNCTURE: CPT | Performed by: STUDENT IN AN ORGANIZED HEALTH CARE EDUCATION/TRAINING PROGRAM

## 2020-07-25 PROCEDURE — 85027 COMPLETE CBC AUTOMATED: CPT | Performed by: STUDENT IN AN ORGANIZED HEALTH CARE EDUCATION/TRAINING PROGRAM

## 2020-07-25 PROCEDURE — 25000131 ZZH RX MED GY IP 250 OP 636 PS 637: Performed by: STUDENT IN AN ORGANIZED HEALTH CARE EDUCATION/TRAINING PROGRAM

## 2020-07-25 PROCEDURE — 25000131 ZZH RX MED GY IP 250 OP 636 PS 637: Performed by: INTERNAL MEDICINE

## 2020-07-25 PROCEDURE — 36415 COLL VENOUS BLD VENIPUNCTURE: CPT | Performed by: THORACIC SURGERY (CARDIOTHORACIC VASCULAR SURGERY)

## 2020-07-25 PROCEDURE — 36415 COLL VENOUS BLD VENIPUNCTURE: CPT

## 2020-07-25 PROCEDURE — 00000146 ZZHCL STATISTIC GLUCOSE BY METER IP

## 2020-07-25 PROCEDURE — 80048 BASIC METABOLIC PNL TOTAL CA: CPT | Performed by: STUDENT IN AN ORGANIZED HEALTH CARE EDUCATION/TRAINING PROGRAM

## 2020-07-25 PROCEDURE — 97116 GAIT TRAINING THERAPY: CPT | Mod: GP | Performed by: REHABILITATION PRACTITIONER

## 2020-07-25 PROCEDURE — 25000132 ZZH RX MED GY IP 250 OP 250 PS 637: Performed by: SURGERY

## 2020-07-25 PROCEDURE — 25000132 ZZH RX MED GY IP 250 OP 250 PS 637: Performed by: PEDIATRICS

## 2020-07-25 PROCEDURE — 97530 THERAPEUTIC ACTIVITIES: CPT | Mod: GP | Performed by: REHABILITATION PRACTITIONER

## 2020-07-25 PROCEDURE — 21400000 ZZH R&B CCU UMMC

## 2020-07-25 PROCEDURE — 85730 THROMBOPLASTIN TIME PARTIAL: CPT | Performed by: STUDENT IN AN ORGANIZED HEALTH CARE EDUCATION/TRAINING PROGRAM

## 2020-07-25 PROCEDURE — 85730 THROMBOPLASTIN TIME PARTIAL: CPT | Performed by: THORACIC SURGERY (CARDIOTHORACIC VASCULAR SURGERY)

## 2020-07-25 PROCEDURE — 25000131 ZZH RX MED GY IP 250 OP 636 PS 637: Performed by: PHYSICIAN ASSISTANT

## 2020-07-25 PROCEDURE — 25000128 H RX IP 250 OP 636: Performed by: STUDENT IN AN ORGANIZED HEALTH CARE EDUCATION/TRAINING PROGRAM

## 2020-07-25 PROCEDURE — 40000275 ZZH STATISTIC RCP TIME EA 10 MIN

## 2020-07-25 PROCEDURE — 80076 HEPATIC FUNCTION PANEL: CPT | Performed by: STUDENT IN AN ORGANIZED HEALTH CARE EDUCATION/TRAINING PROGRAM

## 2020-07-25 PROCEDURE — 99233 SBSQ HOSP IP/OBS HIGH 50: CPT | Mod: GC | Performed by: INTERNAL MEDICINE

## 2020-07-25 PROCEDURE — 25800030 ZZH RX IP 258 OP 636: Performed by: STUDENT IN AN ORGANIZED HEALTH CARE EDUCATION/TRAINING PROGRAM

## 2020-07-25 PROCEDURE — 71045 X-RAY EXAM CHEST 1 VIEW: CPT

## 2020-07-25 PROCEDURE — 25000132 ZZH RX MED GY IP 250 OP 250 PS 637: Performed by: INTERNAL MEDICINE

## 2020-07-25 RX ADMIN — STANDARDIZED SENNA CONCENTRATE AND DOCUSATE SODIUM 2 TABLET: 8.6; 5 TABLET ORAL at 19:20

## 2020-07-25 RX ADMIN — TERBUTALINE SULFATE 10 MG: 5 TABLET ORAL at 05:50

## 2020-07-25 RX ADMIN — PREDNISONE 30 MG: 20 TABLET ORAL at 19:21

## 2020-07-25 RX ADMIN — TERBUTALINE SULFATE 10 MG: 5 TABLET ORAL at 12:28

## 2020-07-25 RX ADMIN — INSULIN HUMAN 30 UNITS: 100 INJECTION, SUSPENSION SUBCUTANEOUS at 19:38

## 2020-07-25 RX ADMIN — TAMSULOSIN HYDROCHLORIDE 0.4 MG: 0.4 CAPSULE ORAL at 08:05

## 2020-07-25 RX ADMIN — INSULIN ASPART 6 UNITS: 100 INJECTION, SOLUTION INTRAVENOUS; SUBCUTANEOUS at 14:33

## 2020-07-25 RX ADMIN — NYSTATIN 1000000 UNITS: 100000 SUSPENSION ORAL at 19:21

## 2020-07-25 RX ADMIN — TERBUTALINE SULFATE 10 MG: 5 TABLET ORAL at 00:20

## 2020-07-25 RX ADMIN — NYSTATIN 1000000 UNITS: 100000 SUSPENSION ORAL at 08:04

## 2020-07-25 RX ADMIN — ASPIRIN 81 MG CHEWABLE TABLET 81 MG: 81 TABLET CHEWABLE at 08:06

## 2020-07-25 RX ADMIN — NYSTATIN 1000000 UNITS: 100000 SUSPENSION ORAL at 12:28

## 2020-07-25 RX ADMIN — PREDNISONE 30 MG: 20 TABLET ORAL at 08:04

## 2020-07-25 RX ADMIN — INSULIN ASPART 5 UNITS: 100 INJECTION, SOLUTION INTRAVENOUS; SUBCUTANEOUS at 09:58

## 2020-07-25 RX ADMIN — VALGANCICLOVIR 450 MG: 450 TABLET, FILM COATED ORAL at 08:06

## 2020-07-25 RX ADMIN — ACETAMINOPHEN 975 MG: 325 TABLET, FILM COATED ORAL at 13:12

## 2020-07-25 RX ADMIN — TERBUTALINE SULFATE 10 MG: 5 TABLET ORAL at 17:33

## 2020-07-25 RX ADMIN — ACETAMINOPHEN 975 MG: 325 TABLET, FILM COATED ORAL at 05:50

## 2020-07-25 RX ADMIN — BIVALIRUDIN 0.04 MG/KG/HR: 250 INJECTION, POWDER, LYOPHILIZED, FOR SOLUTION INTRAVENOUS at 17:33

## 2020-07-25 RX ADMIN — MYCOPHENOLATE MOFETIL 1500 MG: 250 CAPSULE ORAL at 08:05

## 2020-07-25 RX ADMIN — ACETAMINOPHEN 975 MG: 325 TABLET, FILM COATED ORAL at 21:48

## 2020-07-25 RX ADMIN — BIVALIRUDIN 0.04 MG/KG/HR: 250 INJECTION, POWDER, LYOPHILIZED, FOR SOLUTION INTRAVENOUS at 15:31

## 2020-07-25 RX ADMIN — INSULIN ASPART 4 UNITS: 100 INJECTION, SOLUTION INTRAVENOUS; SUBCUTANEOUS at 18:01

## 2020-07-25 RX ADMIN — NYSTATIN 1000000 UNITS: 100000 SUSPENSION ORAL at 15:57

## 2020-07-25 RX ADMIN — PANTOPRAZOLE SODIUM 40 MG: 40 TABLET, DELAYED RELEASE ORAL at 08:06

## 2020-07-25 RX ADMIN — MYCOPHENOLATE MOFETIL 1500 MG: 250 CAPSULE ORAL at 17:33

## 2020-07-25 ASSESSMENT — ACTIVITIES OF DAILY LIVING (ADL)
ADLS_ACUITY_SCORE: 14

## 2020-07-25 NOTE — PLAN OF CARE
VSS on RA. SR 90s. Denies pain. 3 chest tubes to suction. Temp pacer in place. Elevated BG despite coverage, endocrinology consulted. Angiomax infusing and therapeutic at 0.04 mg/kg/hr. Patient hard poke and US utilized via vascular today. Will continue POC.

## 2020-07-25 NOTE — PROGRESS NOTES
VASCULAR NURSE HERE FOR LAB DRAW REQUESTED BY [facility]; [facility] LABS DRAWN WITH US GUIDANCE ON THE FIRST ATTEMPT IN PARTNERSHIP WITH . ANTICOAG INFUSION STOPPED MOMENTARILY FOR LAB DRAW (PRIM RN) RUE NOT USED 2/2 RESTRICTION.   REMEDIOS Fernández, [facility]

## 2020-07-25 NOTE — PLAN OF CARE
"Transfer  Transferred from:   Via: bed  Reason for transfer: Pt inappropriate for unit  Family: Aware of transfer  Belongings: Sent with pt  Chart: Sent with pt  Medications: Meds from bin sent with pt  Report called from: Niesha BAUER RN  2 RN Skin Check: Completed w/ Lay TOVAR. No PU identified.     /55 (BP Location: Left arm)   Pulse 91   Temp 98  F (36.7  C) (Oral)   Resp 19   Ht 1.626 m (5' 4\")   Wt 83 kg (182 lb 15.7 oz)   SpO2 97%   BMI 31.41 kg/m        "

## 2020-07-25 NOTE — PROGRESS NOTES
[facility]   Cardiology II Service / Advanced Heart Failure  Progress note    Lucian Morillo  : 1955  MRN # [id number]    ADMIT DATE: 7/3/2020    Changes today  - Cw terbutaline 10 mg Q8h, wean off isoproterenol   - Holding diuretics   - Start tacro tomorrow pending kidney function   - Started valcyte today and will start bactrim (~) pending renal fxn   - Bivalirudin for HIT, will transition to fondaparinux in coming days once stable from bleeding standpoint      ASSESSMENT & PLAN:  Lucian Henderson Sr. is a 64 year old male with a PMH of HTN, DMII, obesity, CKD, CHANTEL, ischemic cardiomyopathy and severe LV systolic dysfunction s/p 3V CABG () and primary prevention ICD ( 4/2/15). Currently he presents with 3 days of worsening fatigue and SOB with minimal exertion. Patient claims he could walk 1-2 blocks last week but has been barely able to ambulate home recently.     Admitted for possible LVAD/Transplant work up. IABP and listed status 2 for heart transplant . HIT + , underwent PLEX, s/p . Post operative course complicated by graft dysfunction and VT. Graft function back to near normal , LVEF 50-55%.      # ICM s/p 3V CABG () and primary prevention ICD ( 4/2/15)  # HFrEF Stage D, NYHA III-IV s/p OHT   # Ambulatory cardiogenic shock  # Arrhythmia: HF associated VT/VF, VT (, post op from OHT)   # Graft dysfunction (EF 20-25%)   Graft Function: TTE POD#1 w/ LVEF 20-25%, RV severely reduced, suspect 2/2 to ischemic time, minimal change in LV fxn , graft function recovered   --Volume: euvolemi, lasix PRN   --BP: MAP goal >65   --HR: Goal >100,   -- continue with terbutaline 10 mg PO Q6H   CAV prophylaxis: ASA, will start pravastatin in coming days     Pressors/Inotropes:  -none     Immunosuppression:  -s/p Simulect on POD#0, second dose POD#4 ()  -MMF 1500 mg BID  -s/p solumedrol 125 mg q8h  --> transitioned to prednisone   "w/ taper   -will start Tacro 7/26  -routine endomyocardial bx 7 days post transplant (7/27)      Antibiotics:   - vancomycin (will get 3 days)  - cefepime -> ceftriaxone 7/21  - Nystatin Swish&Swallow  - start bactrim in coming days   - CMV: R+ / D+, start valcyte 450 mg qday 7/24  - EBV: R+ / D+  - f/u blood cx, NGTD   - donor blood cx growing strep salivarius in 1/2 bottles  -->transplant ID consult 7/21, rec narrow to ceftriaxone     # Atrial Flutter with RVR  Patient went into Atrial flutter with RVR with rates around 150 overnight on 7/7/2020 at around 2 am. Converted to NSR with amiodarone, which was stopped 7/19 given OHT.     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 Terrazas.     Johan Ford     ..........................................................................................................................................................  Subjective:  SARITA overnight. No arrhythmias. Extubated. Denies any SOB, n/v or abdominal pain.     PHYSICAL EXAM:  /60 (BP Location: Right arm)   Pulse 91   Temp 98.3  F (36.8  C) (Oral)   Resp 18   Ht 1.626 m (5' 4\")   Wt 83 kg (182 lb 15.7 oz)   SpO2 98%   BMI 31.41 kg/m    GENERAL: NAD  NECK: Supple and without lymphadenopathy. JVP unable to assess   CV: S1/S2 heard without murmur   RESPIRATORY: CTAB  GI: Soft and distended. No tenderness, rebound, guarding. No palpable organomegaly.   EXTREMITIES: Peripheral edema trace.   NEUROLOGIC: Follows commands, moves all four extremities   MUSCULOSKELETAL: No joint swelling or tenderness.   SKIN: No jaundice. No acute rashes or lesions.     ROS:   12-pt ROS otherwise negative except as noted above    PMH:  Past Medical History:   Diagnosis Date     CAD (coronary artery disease)      CHF (congestive heart failure) (H)      CKD (chronic kidney " disease), stage III (H)      Cortical cataract of both eyes      Diabetes (H)      Hyperlipidemia      Hypertension      Ischemic cardiomyopathy      Obesity      CHANTEL (obstructive sleep apnea)     occas cpap     Osteoarthritis        PSH:  Past Surgical History:   Procedure Laterality Date     C CABG, ARTERY-VEIN, THREE  02/2008     CATARACT IOL, RT/LT       COLONOSCOPY N/A 8/7/2019    Procedure: COLONOSCOPY, WITH POLYPECTOMY AND BIOPSY;  Surgeon: Chauncey Morataya MD;  Location: [facility] GI     CV RIGHT HEART CATH N/A 3/25/2019    Procedure: CV RIGHT HEART CATH;  Surgeon: Moises Santos MD;  Location:  HEART CARDIAC CATH LAB     CV RIGHT HEART CATH N/A 7/10/2019    Procedure: CV RIGHT HEART CATH;  Surgeon: Jak Mccabe MD;  Location:  HEART CARDIAC CATH LAB     CV RIGHT HEART CATH N/A 7/8/2020    Procedure: Right Heart Cath with Leave In [place] already has ICU load;  Surgeon: Jak Mccabe MD;  Location:  HEART CARDIAC CATH LAB     EXTRACTION(S) DENTAL Left 7/13/2020    Procedure: EXTRACTION, TOOTH #11, 12, 13, 15, and 29;  Surgeon: Monica Chao DDS;  Location: [facility] OR     IMPLANT AUTOMATIC IMPLANTABLE CARDIOVERTER DEFIBRILLATOR       INSERT INTRAAORTIC BALLOON PUMP N/A 7/16/2020    Procedure: Subclavian Intra-Aortic Balloon Pump Placement;  Surgeon: Allan Sparrow MD;  Location: [facility] OR     PHACOEMULSIFICATION CLEAR CORNEA WITH STANDARD IOL, VITRECTOMY PARSPLANA 25 GAGUE, COMBINED Left 12/10/2019    Procedure: PHACOEMULSIFICATION, CATARACT, CLEAR CORNEAL INCISION APPROACH, W STD INTRAOCULAR LENS IMPLANT INSERT + VITRECTOMY BY PARS PLANA  USING 25-GAUGE INSTRUMENTS. ENDOLASER, INFUSION OF 20% SF6 GAS;  Surgeon: Triny Bunch MD;  Location: [facility] OR     PICC INSERTION Right 07/11/2020    basilic 44 cm total      TRANSPLANT HEART RECIPIENT N/A 7/19/2020    Procedure: REDO MEDIAN STERNOTOMY, TRANSPLANT, ORTHOTOPIC HEART, RECIPIENT, ON PUMP OXYGENATOR, REMOVAL OF CARDIAC DEFIBRILLATOR AND LEAD;   Surgeon: Griselli, Massimo, MD;  Location: [facility] OR       MEDICATIONS:  Prior to Admission Medications   Prescriptions Last Dose Informant Patient Reported? Taking?   aspirin 81 MG tablet  Self No No   Sig: Take 1 tablet (81 mg) by mouth daily   atorvastatin (LIPITOR) 40 MG tablet  Self No No   Sig: Take 1 tablet (40 mg) by mouth daily   blood glucose (ACCU-CHEK SMARTVIEW) test strip  Self No No   Sig: USE TO TEST THREE TIMES DAILY AS DIRECTED   blood glucose (NO BRAND SPECIFIED) test strip  Self No No   Sig: Use to test blood sugar 2 times daily or as directed.   blood glucose monitoring (ACCU-CHEK FELIPE SMARTVIEW) meter device kit  Self No No   Sig: Use to test blood sugar 3-4 times daily, as directed.   blood glucose monitoring (ONE TOUCH DELICA) lancets  Self No No   Sig: Use to test blood sugars 2 times daily.   clopidogrel (PLAVIX) 75 MG tablet  Self No No   Sig: Take 1 tablet (75 mg) by mouth daily   exenatide ER (BYDUREON) 2 MG pen  Self No No   Sig: Inject 2 mg Subcutaneous every 7 days   furosemide (LASIX) 20 MG tablet  Self No No   Sig: Take 2 tablets (40 mg) by mouth 2 times daily   glimepiride (AMARYL) 4 MG tablet  Self No No   Sig: Take 1 tablet (4 mg) by mouth every morning (before breakfast)   hydrALAZINE (APRESOLINE) 25 MG tablet  Self No No   Sig: Take 1 tablet (25 mg) by mouth 3 times daily   insulin glargine (LANTUS SOLOSTAR) 100 UNIT/ML pen  Self No No   Sig: Take 8 units in the morning and 40 units in the evening   insulin pen needle (B-D U/F) 31G X 5 MM miscellaneous  Self No No   Si Units by Device route 2 times daily Use 2 pen needles daily or as directed.   isosorbide mononitrate (IMDUR) 60 MG 24 hr tablet  Self No No   Sig: Take 1 tablet (60 mg) by mouth daily   losartan (COZAAR) 50 MG tablet  Spouse/Significant Other No No   Sig: Take 1 tablet (50 mg) by mouth daily   nitroglycerin (NITROSTAT) 0.4 MG SL tablet  Self No No   Sig: Place 1 tablet (0.4 mg) under the tongue every 5 minutes as  needed for chest pain If you are still having symptoms after 3 doses (15 minutes) call 911.   order for DME  Self No No   Sig: Auto CPAP 8-15 cmH20      Facility-Administered Medications Last Administration Doses Remaining   erythromycin (ROMYCIN) ophthalmic ointment None recorded    lidocaine (PF) (XYLOCAINE) 2 % injection 10 mL None recorded 1           ALLERGIES:     Allergies   Allergen Reactions     Heparin Heparin Induced Thrombocytopenia     HIT screen sent 7/18/2020. CORRINE confirmed positive     No Known Drug Allergies        FAMILY HISTORY:  Family History   Problem Relation Age of Onset     Diabetes Brother      Diabetes Sister      Diabetes Sister      Macular Degeneration No family hx of      Glaucoma No family hx of      Myocardial Infarction No family hx of      Kidney Disease No family hx of        SOCIAL HISTORY:  Social History     Socioeconomic History     Marital status:      Spouse name: Not on file     Number of children: 4     Years of education: Not on file     Highest education level: Not on file   Occupational History     Occupation:      Employer: [employer]     Employer: RETIRED   Social Needs     Financial resource strain: Not on file     Food insecurity     Worry: Not on file     Inability: Not on file     Transportation needs     Medical: Not on file     Non-medical: Not on file   Tobacco Use     Smoking status: Never Smoker     Smokeless tobacco: Never Used     Tobacco comment: Never smoked; non-smoking household   Substance and Sexual Activity     Alcohol use: No     Alcohol/week: 0.0 standard drinks     Drug use: No     Sexual activity: Yes     Partners: Female   Lifestyle     Physical activity     Days per week: Not on file     Minutes per session: Not on file     Stress: Not on file   Relationships     Social connections     Talks on phone: Not on file     Gets together: Not on file     Attends Confucianism service: Not on file     Active member of club or  organization: Not on file     Attends meetings of clubs or organizations: Not on file     Relationship status: Not on file     Intimate partner violence     Fear of current or ex partner: Not on file     Emotionally abused: Not on file     Physically abused: Not on file     Forced sexual activity: Not on file   Other Topics Concern     Parent/sibling w/ CABG, MI or angioplasty before 65F 55M? No   Social History Narrative     Not on file       LABS:  BMP  Recent Labs   Lab 07/25/20  0541 07/24/20  1631 07/24/20  1241 07/24/20  0411 07/23/20  2109 07/23/20 0411 07/22/20  1608    138 141 138 137   < > 137 140   POTASSIUM 4.4 4.1 3.6 4.2 4.3   < > 4.8 4.9   CHLORIDE 108 105 111* 106 106   < > 107 110*   CO2 24 24 24 27 28   < > 27 25   BUN 41* 39* 34* 40* 41*   < > 39* 40*   CR 1.40* 1.53* 1.29* 1.35* 1.31*   < > 1.32* 1.47*   * 250* 108* 92 145*   < > 103* 134*   BRYANNA 7.7* 7.9* 6.9* 8.0* 8.1*   < > 8.3* 8.1*   PHOS  --   --  2.5  --  2.4*  --  2.7 2.8    < > = values in this interval not displayed.     CBC  Recent Labs   Lab 07/25/20  0541 07/24/20  1241 07/24/20  0411 07/23/20  2109 07/19/20  1613   WBC 6.6 9.4 8.5 11.1*   < > 7.7   HGB 7.7* 8.4* 7.7* 7.9*   < > 10.8*   HCT 24.7* 27.1* 24.1* 24.7*   < > 34.0*   MCV 96 95 95 95   < > 96    144* 122* 119*   < > 99*   LYMPH  --   --   --   --   --  7.9    < > = values in this interval not displayed.     INR  Recent Labs   Lab 07/25/20  1256 07/25/20  0541 07/24/20  0411 07/23/20  1739  07/20/20  1610 07/20/20  0506 07/20/20  0232 07/20/20  0018   INR  --   --   --   --   --  1.35* 1.35* 1.52* 1.59*   PTT 49* 95* 61* 55*   < >  --  37 36 30    < > = values in this interval not displayed.     IMAGING:    RHC 6/18/20     RA 20/26/18  RV 85/28  PA 82/45/58  PCWP 45  Cardiac output by Jacy: 3.85 L/min (indexed to 2.09 L/min/m2)  Cardiac output by thermodilution: 3.63 L/min (indexed to 1.97 L/min/m2)  SVR (by TD CO): 1123  PVR (by TD CO):  7.4    Angiogram 6/18/20   3 vessel CAD s/p 3V CABG in 2008 (LIMA-LAD, SVG-OM1, SVG-RPDA)  2. Known proximal SVG-RPDA occlusion  3. Presumed occlusion of SVG-OM1 (unable to locate on non-selective aortic root shot)  4. Patent LIMA-LAD with collateralization of LAD to PDA    Echocardiogram ( 3/11/2019)   Moderate to severe left ventricular dilation is present.  Severely (EF 10-20%) reduced left ventricular function is present.  No significant valve dysfunction.  Compared to study done 6/5/17 there is no significant change in LV size and  systolic function    Echocardiogram ( 7/5/2020)   Interpretation Summary  Severely (EF 10-20%) reduced left ventricular function is present. LVEF 15%  based on biplane 2D tracing. Severe left ventricular dilation is present.  LVIDd:6.8 cm. Grade III or advanced diastolic dysfunction.  The right ventricle is normal size.  Global right ventricular function is moderately reduced.  No significant valvular abnormalities were noted.  IVC diameter and respiratory changes fall into an intermediate range  suggesting an RA pressure of 8 mmHg.  Mild pulmonary hypertension is present.     This study was compared with the study from 3/25/2019. RV function has  worsened, LV size and function appear similar.  Devices  ICD ([employer]- 4/2/2015 )     I have reviewed today's vital signs, notes, medications, labs and imaging.  I have also seen and examined the patient and agree with the findings and plan as outlined above.  Pt with spouse at bedside.  VSS with lungs clear and S1 and S2 with n gallop.  Labs with Cr 1.4 and WBC 6.6 with 1.6L UO.  Assessment: Pt with OHT on simulect.  Plan to start tac tomorrow if Cr is the same.  Valcyte already started.  Bx on Monday.     James Terrazas MD, PhD  Professor, Heart Failure and Cardiac Transplantation  [facility]

## 2020-07-25 NOTE — PLAN OF CARE
Discharge Planner PT   Patient plan for discharge: home with 24hr assist from family members, prefers OP CR over home PT. Wife affirms he will have 24/7 A  Current status: Declines  use for rehab services (signed waiver for rehab); still requests verbal interpreting for MD visits . Min-A sit<>stand progressing to CGA with frequent max cues for sit <> stand technique and sternal precautions. Ambulated ' with seated rests.   Barriers to return to prior living situation: fatigue, back pain, balance, endurance, LE weakness, medical, stair negotiation  Recommendations for discharge: Anticipate home and 24hr assist with OP CR, however, must demonstrate safe transfers with sternal precautions and negotiate 3 stairs.   Rationale for recommendations: Pt IND at baseline and will need ongoing skilled PT intervention to improve independence and safety with functional mobility skills prior to returning home. Has support at home.       Entered by: James Mejia 07/25/2020 11:03 AM

## 2020-07-25 NOTE — PLAN OF CARE
D: Admit 7/3 s/p OHT 7/19 c/b graft dysfunction & VT. Hx of HTN, DM2, obesity, CKD, CHANTEL, ICM s/p CABGx3 2008, ICD removed.     I/A: A/Ox4. VSS on RA. SR. Denies pain, scheduled tylenol. Angiomax gtt @ 0.08mg/kg/hr through PIV. 3 CTs to sxn. Temporary pacer AAI at 90bpm, rarely paced. Sternal incisions CDI. Voiding adequately. Regular diet. 2L FR. Ax2 + walker.     P: Continue monitoring & notify CVTS of changes/concerns.

## 2020-07-25 NOTE — PLAN OF CARE
Discharge Planner OT   Patient plan for discharge: Pt would like to return home.   Current status: Pt supine inclined in bed upon arrival. Therapist educated pt on sternal precautions and safety with functional transfers. Pt required Mod A and vc's for transfer supine<->seated EOB. Pt completed transfer sit<->standing and ambulation to/from sink with CGA, vc's and FWW. Pt completed self cares standing at sink with setup, vc's and Min A. Pt completed transfer sit<->standing and marching in place with use of FWW, 6 bouts x 1 minute. Pt tolerated this activity well. VSS.   Barriers to return to prior living situation: Decreased independence with functional transfers and ADLs. Decreased strength and endurance, sternal precautions. Fatigue.   Recommendations for discharge: Anticipate pt will progress to discharge home with A and OP CR.   Rationale for recommendations: Pt will benefit from continued therapy to address barriers above and to maximize functional independence.        Entered by: Lizandro Multani 07/24/2020 11:33 PM

## 2020-07-25 NOTE — PROGRESS NOTES
Cardiovascular Surgery Progress Note  07/25/2020  Surgeon: Dr. Griselli           Assessment and Plan:     Lucian Hendersno Sr. is a 64 year old male with a PMH of end-stage ischemic cardiomyopathy, CAD s/p CABG 2008, DMT2, who was admitted to [facility] 07/03 for heart failure exacerbation with cardiogenic shock, underwent right subclavian IABP insertion 07/16 with Dr. Sparrow, and then heart transplant 07/20 with Dr. Griselli. Hospital course notable for HIT+ 07/19, underwent PLEX prior to transplant    Cardiovascular:   S/p heart transplant  Primary graft dysfunction, improved  - TTE 07/20 with EF 20-25%, severe RV dysfunction, suspected due to ischemic time. TTE 07/21 with EF improved to 50-55%. CI robust off pressors/inotrope 07/24  - RHC/biopsy Monday 07/27 per Cards2  - SBP 120s-130s. Consider hydralazine  - Appears near euvolemic. Filling pressures near normal 07/24. Diuresis PRN  - Chest tubes: output 280, leave today  - TPW: HR NSR 90s-100. TPW at backup rate 90. Terbutaline 10 mg q6H  Immunosuppression per Cards2  - Simulect induction  - MMF 1500 mg BID  - Tacrolimus to start 07/26 per cards pending renal function  - Prednisone taper per cardiology  Serologies/Prophylaxis per Cards2  - CMV: R+ / D+  - EBV: R+ / D+  - Valcyte 450 mg daily 7/24  - Plan Bacrtim 07/27 pending renal function  - Nystatin s/s  - Aspirin 81 mg daily  - Consider starting statin (LFTs 7/25 WNL)    Pulmonary:  Postop mechanical ventilation, resolved  CHANTEL  - Extubated POD 2; Saturating well on RA.   - Supplemental O2 PRN to keep sats > 92%. Wean off as tolerated.  - Pulm toilet, IS, activity and deep breathing  - Resume CPAP at night, patient requests to use hospital CPAP    Neurology / MSK:  Postop pain  - Tylenol scheduled  - Oxycodone PRN  - Dilaudid IV PRN  - Robaxin PRN   / Renal:  CKD 3  - Cr 1.40, trend daily  - Avoid nephrotoxins  - Diuresis as above  Urinary retention  - Tamsulosin 0.4 mg daily    GI / FEN:  "  Nutrition  - Diabetic diet, continue bowel regimen    Endocrine:  DMT2  Stress/medication induced induced hyperglycemia  - Consult to endocrine for blood sugar management    Infectious Disease:  Leukocytosis, resolved  Donor Streptococcus salivarius bacteremia  - WBC WNL, afebrile, no signs or symptoms of infection  - ID following. Completed perioperative antibiotics. Ceftriaxone 2g q24H for donor bacteremia  - Blood culture 7/21     Hematology:   Acute postop blood loss anemia and thrombocytopenia  - Hgb 7.7; Plt 172, no signs or symptoms of active bleeding  HIT+  RUE DVT  - HIT+ 7/19 S/p PLEX 07/19  - US UE 07/22 positve for non-occlusive RIJ DVT, and R cephalic vein occlusive thrombus  - Bival infusion, plan to transition to fondaparinux eventually  - No heparin products    Anticoagulation:   - Aspirin 81 mg daily  - Bival infusion, plan to transition to fondaparinux eventually    Prophylaxis:   - Stress ulcer prophylaxis: Pantoprazole 40 mg daily  - DVT prophylaxis: Bival, SCD    Disposition:   - Transferred to  on 07/24  - Rehab recommending likely discharge to home      Discussed with CVTS Fellow as needed.  Staff surgeons have been informed of changes through both verbal and written communication.      Trixie Quintanilla PA-C  Cardiothoracic Surgery  Pager [phone]            Interval History:   Up walking, denies SOB, but felt easily fatigued. Pain controlled.         Physical Exam:   Blood pressure 135/63, pulse 91, temperature 98.1  F (36.7  C), temperature source Oral, resp. rate 18, height 1.626 m (5' 4\"), weight 83 kg (182 lb 15.7 oz), SpO2 96 %.  Vitals:    07/22/20 0500 07/23/20 0400 07/24/20 0600   Weight: 84.2 kg (185 lb 10 oz) 84.8 kg (186 lb 15.2 oz) 83 kg (182 lb 15.7 oz)          Gen: NAD, resting in bed  CV: tachycardic, regular, S1S2 normal, no murmurs, rubs, or gallops. JVP not elevated  Pulm: diminished bases, no rhonchi or wheezes  Abd: soft, non-tender, no guarding, +BS  Ext: no lower " extremity edema  Incision: sternal incision clean, dry, intact, no erythema; left ICD pocket clean, dry, intact, no erythema or drainage, right subclavian IABP site incision clean, dry, intact, no eythema or drainage  Chest Tube sites: dressings clean and dry, serosanguinous, no air leak, output 280 ml  Neuro: grossly normal  Psych: calm, cooperative            Data:    Imaging:  reviewed recent imaging  No results found for this or any previous visit (from the past 24 hour(s)).      Labs:  BMP  Recent Labs   Lab 07/24/20  1631 07/24/20  1241 07/24/20  0411 07/23/20  2109    141 138 137   POTASSIUM 4.1 3.6 4.2 4.3   CHLORIDE 105 111* 106 106   BRYANNA 7.9* 6.9* 8.0* 8.1*   CO2 24 24 27 28   BUN 39* 34* 40* 41*   CR 1.53* 1.29* 1.35* 1.31*   * 108* 92 145*     CBC  Recent Labs   Lab 07/25/20  0541 07/24/20  1241 07/24/20  0411 07/23/20  2109   WBC 6.6 9.4 8.5 11.1*   RBC 2.58* 2.84* 2.55* 2.59*   HGB 7.7* 8.4* 7.7* 7.9*   HCT 24.7* 27.1* 24.1* 24.7*   MCV 96 95 95 95   MCH 29.8 29.6 30.2 30.5   MCHC 31.2* 31.0* 32.0 32.0   RDW 16.0* 15.9* 16.1* 16.5*    144* 122* 119*     INR  Recent Labs   Lab 07/20/20  1610 07/20/20  0506 07/20/20  0232 07/20/20  0018   INR 1.35* 1.35* 1.52* 1.59*      Liver Function Studies -   Recent Labs   Lab Test 07/22/20  0414   PROTTOTAL 5.5*   ALBUMIN 2.4*   BILITOTAL 0.4   ALKPHOS 75   AST 28   ALT 25     GLUCOSE:   Recent Labs   Lab 07/25/20  0306 07/24/20  2157 07/24/20  1631 07/24/20  1629 07/24/20  1241 07/24/20  1240 07/24/20  1005 07/24/20  0928  07/24/20  0411  07/23/20  2109 07/23/20  1630  07/23/20  1140   GLC  --   --  250*  --  108*  --   --   --   --  92  --  145* 244*  --  336*   * 277*  --  245*  --  116* 95 53*   < >  --    < >  --  237*   < >  --     < > = values in this interval not displayed.

## 2020-07-25 NOTE — PROGRESS NOTES
BRIEF HEMATOLOGY PROGRESS NOTE        Lucian Henderson Sr. is a 64 year old male who underwent OHT on 7/19. 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 Subsequent CORRINE was positive confirming HIT.     B/L UE Doppler 7/22/20 demonstrated occlusive thrombus in the superficial cephalic vein and nonocclusive thrombus in the RIJ along the intravenous catheter as well as R axillary vein along the R PICC line.      Patient was started on IV Bivalirudin on 7/22. His platelet count has been slowly increasing and normalized to 172 today.     - Recommend discontinuing IV Bivalirudin Monday 7/27 at 1700.   - Start Fondaparinux 7.5 mg subcutaneous daily. He can receive the first dose on Monday 2 hours after the Bivalirudin has been turned off (patient weight ~ 83 kg today).        We will continue to follow. Above was discussed with Dr. Garza.      Ty Aguirre   Hematology/Oncology Fellow  Pager: [phone]   Attending  The patient was seen and examined by me separate from the resident/fellow provider.The note above reflects my assessment and plan. I have personally reviewed today's labs,vital and radiology results. The points of care that were added by me are:    Lucian looks amazing today.Platelet count up to 178 k on bivalirudin.Will change to fonduparinux 7.5 mg Sub q tomorrow    William Garza M.D.  [phone]

## 2020-07-25 NOTE — CONSULTS
Diabetes/Hyperglycemia Management Consult    Chief Complaint T2DM exacerbated s/p heart transplant and following transition from IV drip  Consult requested by: Attending  History of Present Illness   Lucian Oliveira Santiago Rivas. is a 64 year old male with a PMH of end-stage ischemic cardiomyopathy, CAD s/p CABG 2008, DMT2, who was admitted to [facility] 07/03 for heart failure exacerbation with cardiogenic shock, underwent right subclavian IABP insertion 07/16 with Dr. Sparrow, and then heart transplant 07/20 with Dr. Griselli. 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    Prior to admission, he had last been seen in  Endocrinology clinic in November 2019 and was taking Lantus 48 u daily, Januvia 50 mg daily, Amaryl 4 mg each morning with marginal control and A1c last known to be at 7.9%.  He was advised to start Bydureonn 2 mg weekly and follow-up, but per chart never did follow-up with endocrinology nor primary care or other provider that I can appreciate.  Finances have been a barrier in his care.    Lucian tells me that prior toadmission had BG well controlled.  , 96, 103, 85 taking just Lantus 40 units qpm and 1 or 2 tablets, bit really can't remember which and advised I check with his wife.  He is concerned Bg so high here and fears it is do to the apple juice he has morning.  He doesn't have this at home.  No salt, no sugar.  He is excited that walked to day and looks forward to rehabilitation and discharging soon. Laments that was unable to work since I last saw him.  There is a lot of work.  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  Happy he walked a long way on the unit this a,. But noted Bg did not come down.  He thinks is also the medications.        During this admission I note that he required 104 units of correction insulin the last 24 hours while on IV drip with 40 mg twice daily of prednisone.  In the last 24 hours he  "is received 40 units of glargine together with 34 units of correction insulin for a total of 74 units but with significant hyperglycemia, and blood sugars well above goal.     Recent Labs   Lab 07/25/20  0957 07/25/20  0541 07/25/20  0306 07/24/20  2157 07/24/20  1631 07/24/20  1629 07/24/20  1241 07/24/20  1240 07/24/20  1005  07/24/20  0411  07/23/20  2109 07/23/20  1630   GLC  --  245*  --   --  250*  --  108*  --   --   --  92  --  145* 244*   *  --  250* 277*  --  245*  --  116* 95   < >  --    < >  --  237*    < > = values in this interval not displayed.     Lab Results   Component Value Date    A1C 8.2 07/03/2020    A1C 7.9 07/08/2019    A1C 8.5 03/11/2019    A1C 7.6 10/16/2018    A1C 9.8 09/06/2017         Diabetes Type: Type II exacerbated by steroids status post d cardiac transplant   Diabetes Duration: At least 20 years  Usual Diabetes Regimen:   Lantus 40 units daily, unknown tablet. States that I should contact wife Shivani \"my nurse\" to review.    Ability to [place] Prescribed Regimen: In August 2019 patient reported good adherence.  Diabetes Control:   Lab Results   Component Value Date    A1C 8.2 07/03/2020    A1C 7.9 07/08/2019    A1C 8.5 03/11/2019    A1C 7.6 10/16/2018    A1C 9.8 09/06/2017     Diabetes Complications: Severe his diabetes is complicated by proliferative retinopathy, chronic kidney disease.  He has no known neuropathy.  History of DKA: None known  Able to Detect Hypoglycemia: Reports that yes  Usual Diabetes Care Provider: Marisabel Prieto  Factors Impacting Glucose Control: Multiple chronic illnesses.  His wife is very supportive.  Finances are a barrier patient has felt a lot of pressure to return to work.      Review of Systems  10 point ROS completed with pertinent positives and negatives noted in the HPI    Past medical, family and social histories are reviewed and updated.    Past Medical History  Past Medical History:   Diagnosis Date     CAD (coronary artery disease)  "     CHF (congestive heart failure) (H)      CKD (chronic kidney disease), stage III (H)      Cortical cataract of both eyes      Diabetes (H)      Hyperlipidemia      Hypertension      Ischemic cardiomyopathy      Obesity      CHANTEL (obstructive sleep apnea)     occas cpap     Osteoarthritis        Family History  Family History   Problem Relation Age of Onset     Diabetes Brother      Diabetes Sister      Diabetes Sister      Macular Degeneration No family hx of      Glaucoma No family hx of      Myocardial Infarction No family hx of      Kidney Disease No family hx of        Social History  Social History     Socioeconomic History     Marital status:      Spouse name: None     Number of children: 4     Years of education: None     Highest education level: None   Occupational History     Occupation:      Employer: [employer]     Employer: RETIRED   Social Needs     Financial resource strain: None     Food insecurity     Worry: None     Inability: None     Transportation needs     Medical: None     Non-medical: None   Tobacco Use     Smoking status: Never Smoker     Smokeless tobacco: Never Used     Tobacco comment: Never smoked; non-smoking household   Substance and Sexual Activity     Alcohol use: No     Alcohol/week: 0.0 standard drinks     Drug use: No     Sexual activity: Yes     Partners: Female   Lifestyle     Physical activity     Days per week: None     Minutes per session: None     Stress: None   Relationships     Social connections     Talks on phone: None     Gets together: None     Attends Tenriism service: None     Active member of club or organization: None     Attends meetings of clubs or organizations: None     Relationship status: None     Intimate partner violence     Fear of current or ex partner: None     Emotionally abused: None     Physically abused: None     Forced sexual activity: None   Other Topics Concern     Parent/sibling w/ CABG, MI or angioplasty before 65F  "55M? No   Social History Narrative     None         Physical Exam  Vital signs:  Temp: 98.5  F (36.9  C) Temp src: Oral BP: (!) 141/55 Pulse: 91 Heart Rate: 97 Resp: 18 SpO2: 100 % O2 Device: None (Room air) Oxygen Delivery: 1.5 LPM Height: (162.6cm per EPIC 7/3/2020) Weight: 83 kg (182 lb 15.7 oz)  Estimated body mass index is 31.41 kg/m  as calculated from the following:    Height as of this encounter: 1.626 m (5' 4\").    Weight as of this encounter: 83 kg (182 lb 15.7 oz).      Visit in [place].  Limited exam 2 Covid precautions.  General:  pleasant  resting in bed, in no distress. Seating in chair - very conversant.  Large sternal scar visible through hospital gown.   HEENT: NC/AT, PER and anicteric, non-injected, oral mucous membranes moist.   Lungs: Easy respiration, no cough  ABD: protuberant.   Skin: , no obvious lesions  MSK:  fluid movement of all extremities  Lymp:  no LE edema noted  Mental status: alert, oriented x3, communicating clearly.  Stable - does not recall all meds.   Psych: calm, even mood.  Expresses gratitude for care, for visit    Laboratory  Recent Labs   Lab Test 07/25/20 0541 07/24/20  1631    138   POTASSIUM 4.4 4.1   CHLORIDE 108 105   CO2 24 24   ANIONGAP 6 9   * 250*   BUN 41* 39*   CR 1.40* 1.53*   BRYANNA 7.7* 7.9*     CBC RESULTS:   Recent Labs   Lab Test 07/25/20  0541   WBC 6.6   RBC 2.58*   HGB 7.7*   HCT 24.7*   MCV 96   MCH 29.8   MCHC 31.2*   RDW 16.0*          Liver Function Studies -   Recent Labs   Lab Test 07/25/20  0541   PROTTOTAL 5.4*   ALBUMIN 2.2*   BILITOTAL 0.4   ALKPHOS 82   AST 14   ALT 21       Active Medications  Current Facility-Administered Medications   Medication     acetaminophen (TYLENOL) tablet 975 mg     aspirin (ASA) chewable tablet 81 mg     bisacodyl (DULCOLAX) Suppository 10 mg     bivalirudin (ANGIOMAX) 250 mg in sodium chloride 0.9 % 250 mL ANTICOAGULANT infusion     dextrose 10% infusion     glucose gel 15-30 g    Or     " dextrose 50 % injection 25-50 mL    Or     glucagon injection 1 mg     diphenhydrAMINE (BENADRYL) solution 12.5 mg    Or     diphenhydrAMINE (BENADRYL) injection 12.5 mg     HYDROmorphone (DILAUDID) injection 0.2 mg     insulin aspart (NovoLOG) injection (RAPID ACTING)     insulin aspart (NovoLOG) injection (RAPID ACTING)     insulin aspart (NovoLOG) injection (RAPID ACTING)     insulin aspart (NovoLOG) injection (RAPID ACTING)     insulin aspart (NovoLOG) injection (RAPID ACTING)     insulin glargine (LANTUS PEN) injection 20 Units     isoproterenol (ISUPREL) 1 mg in D5W 50 mL infusion     lidocaine (LMX4) cream     lidocaine 1 % 0.1-1 mL     magnesium sulfate 2 g in water intermittent infusion     magnesium sulfate 4 g in 100 mL sterile water (premade)     medication instruction     melatonin tablet 1 mg     methocarbamol (ROBAXIN) tablet 500 mg     mycophenolate (GENERIC EQUIVALENT) capsule 1,500 mg     naloxone (NARCAN) injection 0.1-0.4 mg     nystatin (MYCOSTATIN) suspension 1,000,000 Units     ondansetron (ZOFRAN-ODT) ODT tab 4 mg    Or     ondansetron (ZOFRAN) injection 4 mg     oxyCODONE (ROXICODONE) tablet 5 mg     pantoprazole (PROTONIX) EC tablet 40 mg     polyethylene glycol (MIRALAX) Packet 17 g     potassium chloride (KLOR-CON) Packet 20-40 mEq     potassium chloride 10 mEq in 100 mL intermittent infusion with 10 mg lidocaine     potassium chloride 10 mEq in 100 mL sterile water intermittent infusion (premix)     potassium chloride 20 mEq in 50 mL intermittent infusion     potassium chloride ER (KLOR-CON M) CR tablet 20-40 mEq     predniSONE (DELTASONE) tablet 30 mg    Followed by     [START ON 7/27/2020] predniSONE (DELTASONE) tablet 25 mg    Followed by     [START ON 7/29/2020] predniSONE (DELTASONE) tablet 20 mg     prochlorperazine (COMPAZINE) injection 5 mg    Or     prochlorperazine (COMPAZINE) tablet 5 mg     Reason beta blocker order not selected     senna-docusate (SENOKOT-S/PERICOLACE)  8.6-50 MG per tablet 1 tablet    Or     senna-docusate (SENOKOT-S/PERICOLACE) 8.6-50 MG per tablet 2 tablet     senna-docusate (SENOKOT-S/PERICOLACE) 8.6-50 MG per tablet 1 tablet    Or     senna-docusate (SENOKOT-S/PERICOLACE) 8.6-50 MG per tablet 2 tablet     sodium chloride (PF) 0.9% PF flush 3 mL     sodium chloride (PF) 0.9% PF flush 3 mL     sodium phosphate 10 mmol in D5W intermittent infusion     sodium phosphate 15 mmol in D5W intermittent infusion     sodium phosphate 20 mmol in D5W intermittent infusion     sodium phosphate 25 mmol in D5W intermittent infusion     tamsulosin (FLOMAX) capsule 0.4 mg     terbutaline (BRETHINE) tablet 10 mg     valGANciclovir (VALCYTE) tablet 450 mg     No current outpatient medications on file.       Current Diet  Orders Placed This Encounter      Consistent Carbohydrate Diet 4520-3753 Calories: High Consistent CHO (4-7 CHO units/meal)        Assessment  Type 2 diabetes exacerbated by steroids following cardiac transplant.  Current basal and potentially meal dosing and  inadequate to meet glycemic goals.      Plan  Will switch basal insulin Lantus to NPH to be given concurrently with prednisone dose.    Will increase basal insulin dose from 20 units twice daily to 30 units twice daily.  Continue current correction scale.  Continue 10 units per meal.   Will move to bedtime and overnight correction for any BG >180. Adding 2 am correction.     It is my privilege to be involved in the care of the above patient.     Marisabel Prieto PA-C, MPAS  [facility]  Diabetes, Endocrinology, and Metabolism  [phone] Appointments/Nurse  [phone] Fax  [phone] pager  [phone] nurse line            Diabetes Management Team job code: [id number]

## 2020-07-26 ENCOUNTER — APPOINTMENT (OUTPATIENT)
Dept: GENERAL RADIOLOGY | Facility: CLINIC | Age: 65
DRG: 001 | End: 2020-07-26
Attending: INTERNAL MEDICINE
Payer: COMMERCIAL

## 2020-07-26 ENCOUNTER — APPOINTMENT (OUTPATIENT)
Dept: GENERAL RADIOLOGY | Facility: CLINIC | Age: 65
DRG: 001 | End: 2020-07-26
Attending: PHYSICIAN ASSISTANT
Payer: COMMERCIAL

## 2020-07-26 ENCOUNTER — APPOINTMENT (OUTPATIENT)
Dept: PHYSICAL THERAPY | Facility: CLINIC | Age: 65
DRG: 001 | End: 2020-07-26
Attending: INTERNAL MEDICINE
Payer: COMMERCIAL

## 2020-07-26 ENCOUNTER — APPOINTMENT (OUTPATIENT)
Dept: OCCUPATIONAL THERAPY | Facility: CLINIC | Age: 65
DRG: 001 | End: 2020-07-26
Attending: INTERNAL MEDICINE
Payer: COMMERCIAL

## 2020-07-26 LAB
ANION GAP SERPL CALCULATED.3IONS-SCNC: 5 MMOL/L (ref 3–14)
APTT PPP: 39 SEC (ref 22–37)
APTT PPP: 40 SEC (ref 22–37)
APTT PPP: 41 SEC (ref 22–37)
APTT PPP: 51 SEC (ref 22–37)
BASOPHILS # BLD AUTO: 0 10E9/L (ref 0–0.2)
BASOPHILS NFR BLD AUTO: 0.1 %
BUN SERPL-MCNC: 37 MG/DL (ref 7–30)
CALCIUM SERPL-MCNC: 8 MG/DL (ref 8.5–10.1)
CHLORIDE SERPL-SCNC: 109 MMOL/L (ref 94–109)
CO2 SERPL-SCNC: 26 MMOL/L (ref 20–32)
CREAT SERPL-MCNC: 1.33 MG/DL (ref 0.66–1.25)
DIFFERENTIAL METHOD BLD: ABNORMAL
EOSINOPHIL # BLD AUTO: 0 10E9/L (ref 0–0.7)
EOSINOPHIL NFR BLD AUTO: 0 %
ERYTHROCYTE [DISTWIDTH] IN BLOOD BY AUTOMATED COUNT: 16.2 % (ref 10–15)
GFR SERPL CREATININE-BSD FRML MDRD: 55 ML/MIN/{1.73_M2}
GLUCOSE BLDC GLUCOMTR-MCNC: 153 MG/DL (ref 70–99)
GLUCOSE BLDC GLUCOMTR-MCNC: 190 MG/DL (ref 70–99)
GLUCOSE BLDC GLUCOMTR-MCNC: 193 MG/DL (ref 70–99)
GLUCOSE BLDC GLUCOMTR-MCNC: 267 MG/DL (ref 70–99)
GLUCOSE BLDC GLUCOMTR-MCNC: 286 MG/DL (ref 70–99)
GLUCOSE BLDC GLUCOMTR-MCNC: 317 MG/DL (ref 70–99)
GLUCOSE SERPL-MCNC: 166 MG/DL (ref 70–99)
HCT VFR BLD AUTO: 25.8 % (ref 40–53)
HGB BLD-MCNC: 8.2 G/DL (ref 13.3–17.7)
IMM GRANULOCYTES # BLD: 0.2 10E9/L (ref 0–0.4)
IMM GRANULOCYTES NFR BLD: 2.6 %
LYMPHOCYTES # BLD AUTO: 0.1 10E9/L (ref 0.8–5.3)
LYMPHOCYTES NFR BLD AUTO: 1.2 %
MCH RBC QN AUTO: 30.6 PG (ref 26.5–33)
MCHC RBC AUTO-ENTMCNC: 31.8 G/DL (ref 31.5–36.5)
MCV RBC AUTO: 96 FL (ref 78–100)
MONOCYTES # BLD AUTO: 0.3 10E9/L (ref 0–1.3)
MONOCYTES NFR BLD AUTO: 3.1 %
NEUTROPHILS # BLD AUTO: 8.5 10E9/L (ref 1.6–8.3)
NEUTROPHILS NFR BLD AUTO: 93 %
NRBC # BLD AUTO: 0 10*3/UL
NRBC BLD AUTO-RTO: 0 /100
PHOSPHATE SERPL-MCNC: 2.4 MG/DL (ref 2.5–4.5)
PLATELET # BLD AUTO: 216 10E9/L (ref 150–450)
POTASSIUM SERPL-SCNC: 4.7 MMOL/L (ref 3.4–5.3)
RBC # BLD AUTO: 2.68 10E12/L (ref 4.4–5.9)
SODIUM SERPL-SCNC: 140 MMOL/L (ref 133–144)
WBC # BLD AUTO: 9.2 10E9/L (ref 4–11)

## 2020-07-26 PROCEDURE — 25000132 ZZH RX MED GY IP 250 OP 250 PS 637: Performed by: STUDENT IN AN ORGANIZED HEALTH CARE EDUCATION/TRAINING PROGRAM

## 2020-07-26 PROCEDURE — 25800030 ZZH RX IP 258 OP 636: Performed by: STUDENT IN AN ORGANIZED HEALTH CARE EDUCATION/TRAINING PROGRAM

## 2020-07-26 PROCEDURE — 25000132 ZZH RX MED GY IP 250 OP 250 PS 637: Performed by: INTERNAL MEDICINE

## 2020-07-26 PROCEDURE — 25000131 ZZH RX MED GY IP 250 OP 636 PS 637: Performed by: INTERNAL MEDICINE

## 2020-07-26 PROCEDURE — 00000146 ZZHCL STATISTIC GLUCOSE BY METER IP

## 2020-07-26 PROCEDURE — 97535 SELF CARE MNGMENT TRAINING: CPT | Mod: GO

## 2020-07-26 PROCEDURE — 85730 THROMBOPLASTIN TIME PARTIAL: CPT | Performed by: THORACIC SURGERY (CARDIOTHORACIC VASCULAR SURGERY)

## 2020-07-26 PROCEDURE — 97530 THERAPEUTIC ACTIVITIES: CPT | Mod: GP | Performed by: REHABILITATION PRACTITIONER

## 2020-07-26 PROCEDURE — 99233 SBSQ HOSP IP/OBS HIGH 50: CPT | Mod: GC | Performed by: INTERNAL MEDICINE

## 2020-07-26 PROCEDURE — 25000132 ZZH RX MED GY IP 250 OP 250 PS 637: Performed by: PHYSICIAN ASSISTANT

## 2020-07-26 PROCEDURE — 21400000 ZZH R&B CCU UMMC

## 2020-07-26 PROCEDURE — 40000556 ZZH STATISTIC PERIPHERAL IV START W US GUIDANCE

## 2020-07-26 PROCEDURE — 25000131 ZZH RX MED GY IP 250 OP 636 PS 637: Performed by: STUDENT IN AN ORGANIZED HEALTH CARE EDUCATION/TRAINING PROGRAM

## 2020-07-26 PROCEDURE — 36415 COLL VENOUS BLD VENIPUNCTURE: CPT | Performed by: THORACIC SURGERY (CARDIOTHORACIC VASCULAR SURGERY)

## 2020-07-26 PROCEDURE — 80048 BASIC METABOLIC PNL TOTAL CA: CPT | Performed by: THORACIC SURGERY (CARDIOTHORACIC VASCULAR SURGERY)

## 2020-07-26 PROCEDURE — 84100 ASSAY OF PHOSPHORUS: CPT | Performed by: THORACIC SURGERY (CARDIOTHORACIC VASCULAR SURGERY)

## 2020-07-26 PROCEDURE — 25000125 ZZHC RX 250: Performed by: STUDENT IN AN ORGANIZED HEALTH CARE EDUCATION/TRAINING PROGRAM

## 2020-07-26 PROCEDURE — 85025 COMPLETE CBC W/AUTO DIFF WBC: CPT | Performed by: THORACIC SURGERY (CARDIOTHORACIC VASCULAR SURGERY)

## 2020-07-26 PROCEDURE — 71045 X-RAY EXAM CHEST 1 VIEW: CPT

## 2020-07-26 PROCEDURE — 97116 GAIT TRAINING THERAPY: CPT | Mod: GP | Performed by: REHABILITATION PRACTITIONER

## 2020-07-26 PROCEDURE — 25000132 ZZH RX MED GY IP 250 OP 250 PS 637: Performed by: PEDIATRICS

## 2020-07-26 PROCEDURE — 97110 THERAPEUTIC EXERCISES: CPT | Mod: GO

## 2020-07-26 PROCEDURE — 36415 COLL VENOUS BLD VENIPUNCTURE: CPT

## 2020-07-26 PROCEDURE — 71045 X-RAY EXAM CHEST 1 VIEW: CPT | Mod: 77

## 2020-07-26 PROCEDURE — 25000132 ZZH RX MED GY IP 250 OP 250 PS 637: Performed by: SURGERY

## 2020-07-26 RX ORDER — FUROSEMIDE 20 MG
20 TABLET ORAL ONCE
Status: COMPLETED | OUTPATIENT
Start: 2020-07-26 | End: 2020-07-26

## 2020-07-26 RX ORDER — SULFAMETHOXAZOLE AND TRIMETHOPRIM 400; 80 MG/1; MG/1
1 TABLET ORAL DAILY
Status: DISCONTINUED | OUTPATIENT
Start: 2020-07-26 | End: 2020-08-03 | Stop reason: HOSPADM

## 2020-07-26 RX ORDER — TACROLIMUS 0.5 MG/1
0.5 CAPSULE, GELATIN COATED ORAL
Status: DISCONTINUED | OUTPATIENT
Start: 2020-07-26 | End: 2020-07-28

## 2020-07-26 RX ADMIN — TERBUTALINE SULFATE 10 MG: 5 TABLET ORAL at 05:48

## 2020-07-26 RX ADMIN — ACETAMINOPHEN 975 MG: 325 TABLET, FILM COATED ORAL at 12:22

## 2020-07-26 RX ADMIN — INSULIN ASPART 3 UNITS: 100 INJECTION, SOLUTION INTRAVENOUS; SUBCUTANEOUS at 10:09

## 2020-07-26 RX ADMIN — ASPIRIN 81 MG CHEWABLE TABLET 81 MG: 81 TABLET CHEWABLE at 07:45

## 2020-07-26 RX ADMIN — MYCOPHENOLATE MOFETIL 1500 MG: 250 CAPSULE ORAL at 07:45

## 2020-07-26 RX ADMIN — SULFAMETHOXAZOLE AND TRIMETHOPRIM 1 TABLET: 400; 80 TABLET ORAL at 15:57

## 2020-07-26 RX ADMIN — METHOCARBAMOL 500 MG: 500 TABLET, FILM COATED ORAL at 09:26

## 2020-07-26 RX ADMIN — NYSTATIN 1000000 UNITS: 100000 SUSPENSION ORAL at 12:22

## 2020-07-26 RX ADMIN — ACETAMINOPHEN 975 MG: 325 TABLET, FILM COATED ORAL at 05:48

## 2020-07-26 RX ADMIN — PREDNISONE 30 MG: 20 TABLET ORAL at 07:46

## 2020-07-26 RX ADMIN — TERBUTALINE SULFATE 10 MG: 5 TABLET ORAL at 12:22

## 2020-07-26 RX ADMIN — VALGANCICLOVIR 450 MG: 450 TABLET, FILM COATED ORAL at 07:45

## 2020-07-26 RX ADMIN — STANDARDIZED SENNA CONCENTRATE AND DOCUSATE SODIUM 2 TABLET: 8.6; 5 TABLET ORAL at 20:22

## 2020-07-26 RX ADMIN — FUROSEMIDE 20 MG: 20 TABLET ORAL at 15:57

## 2020-07-26 RX ADMIN — MYCOPHENOLATE MOFETIL 1500 MG: 250 CAPSULE ORAL at 17:32

## 2020-07-26 RX ADMIN — PREDNISONE 30 MG: 20 TABLET ORAL at 20:23

## 2020-07-26 RX ADMIN — INSULIN ASPART 6 UNITS: 100 INJECTION, SOLUTION INTRAVENOUS; SUBCUTANEOUS at 15:01

## 2020-07-26 RX ADMIN — NYSTATIN 1000000 UNITS: 100000 SUSPENSION ORAL at 07:44

## 2020-07-26 RX ADMIN — NYSTATIN 1000000 UNITS: 100000 SUSPENSION ORAL at 15:58

## 2020-07-26 RX ADMIN — PANTOPRAZOLE SODIUM 40 MG: 40 TABLET, DELAYED RELEASE ORAL at 07:45

## 2020-07-26 RX ADMIN — ACETAMINOPHEN 975 MG: 325 TABLET, FILM COATED ORAL at 22:59

## 2020-07-26 RX ADMIN — NYSTATIN 1000000 UNITS: 100000 SUSPENSION ORAL at 20:23

## 2020-07-26 RX ADMIN — TERBUTALINE SULFATE 10 MG: 5 TABLET ORAL at 00:49

## 2020-07-26 RX ADMIN — INSULIN HUMAN 30 UNITS: 100 INJECTION, SUSPENSION SUBCUTANEOUS at 09:20

## 2020-07-26 RX ADMIN — SODIUM PHOSPHATE, MONOBASIC, MONOHYDRATE AND SODIUM PHOSPHATE, DIBASIC, ANHYDROUS 15 MMOL: 276; 142 INJECTION, SOLUTION INTRAVENOUS at 12:22

## 2020-07-26 RX ADMIN — TAMSULOSIN HYDROCHLORIDE 0.4 MG: 0.4 CAPSULE ORAL at 07:45

## 2020-07-26 RX ADMIN — TACROLIMUS 0.5 MG: 0.5 CAPSULE, GELATIN COATED ORAL at 17:37

## 2020-07-26 RX ADMIN — INSULIN ASPART 8 UNITS: 100 INJECTION, SOLUTION INTRAVENOUS; SUBCUTANEOUS at 18:48

## 2020-07-26 RX ADMIN — TACROLIMUS 0.5 MG: 0.5 CAPSULE, GELATIN COATED ORAL at 10:13

## 2020-07-26 RX ADMIN — TERBUTALINE SULFATE 10 MG: 5 TABLET ORAL at 17:32

## 2020-07-26 ASSESSMENT — ACTIVITIES OF DAILY LIVING (ADL)
ADLS_ACUITY_SCORE: 14

## 2020-07-26 ASSESSMENT — MIFFLIN-ST. JEOR: SCORE: 1520.18

## 2020-07-26 ASSESSMENT — PAIN DESCRIPTION - DESCRIPTORS: DESCRIPTORS: ACHING;SORE

## 2020-07-26 NOTE — PROGRESS NOTES
[facility]   Cardiology II Service / Advanced Heart Failure  Progress note    Lucian Morillo  : 1955  MRN # [id number]    ADMIT DATE: 7/3/2020    Changes today  - Lasix 20 mg PO once   - Start tacro 0.5 mg PO bid and check trough tomorrow   - start bactrim single strength daily   - Bivalirudin for HIT, will transition to fondaparinux in coming days once stable from bleeding standpoint    - plan for NPO tonight for routine RHC and biopsy tomorrow   - TTE complete tomorrow     ASSESSMENT & PLAN:  Lucian Henderson Sr. is a 64 year old male with a PMH of HTN, DMII, obesity, CKD, CHANTEL, ischemic cardiomyopathy and severe LV systolic dysfunction s/p 3V CABG () and primary prevention ICD ( 4/2/15). Currently he presents with 3 days of worsening fatigue and SOB with minimal exertion. Patient claims he could walk 1-2 blocks last week but has been barely able to ambulate home recently.     Admitted for possible LVAD/Transplant work up. IABP and listed status 2 for heart transplant . HIT + , underwent PLEX, s/p . Post operative course complicated by graft dysfunction and VT. Graft function back to near normal , LVEF 50-55%.      # ICM s/p 3V CABG () and primary prevention ICD ( 4/2/15)  # HFrEF Stage D, NYHA III-IV s/p OHT   # Ambulatory cardiogenic shock  # Arrhythmia: HF associated VT/VF, VT (, post op from OHT)   # Graft dysfunction (EF 20-25%)   Graft Function: TTE POD#1 w/ LVEF 20-25%, RV severely reduced, suspect 2/2 to ischemic time, minimal change in LV fxn , graft function recovered   --Volume: euvolemi, lasix PRN   --BP: MAP goal >65   --HR: Goal >100,   -- continue with terbutaline 10 mg PO Q6H   CAV prophylaxis: ASA, will start pravastatin in coming days     Pressors/Inotropes:  -none     Immunosuppression:  -s/p Simulect on POD#0, second dose POD#4 ()  -MMF 1500 mg BID  -s/p solumedrol 125 mg q8h  --> transitioned to  "prednisone  w/ taper   - Tacro started on 7/26: 0.5 mg PO bid   -routine endomyocardial bx 7 days post transplant (7/27) - NPO overnight       Antibiotics:   - vancomycin (will get 3 days)  - cefepime -> ceftriaxone 7/21  - Nystatin Swish&Swallow  -  bactrim started single strength daily   - CMV: R+ / D+, start valcyte 450 mg qday 7/24  - EBV: R+ / D+  - f/u blood cx, NGTD   - donor blood cx growing strep salivarius in 1/2 bottles  -->transplant ID consult 7/21, rec narrow to ceftriaxone     # Atrial Flutter with RVR  Patient went into Atrial flutter with RVR with rates around 150 overnight on 7/7/2020 at around 2 am. Converted to NSR with amiodarone, which was stopped 7/19 given OHT.     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 Terrazas.     Johan Ford     ..........................................................................................................................................................  Subjective:  SARITA overnight. No arrhythmias. Extubated. Denies any SOB, n/v or abdominal pain.     PHYSICAL EXAM:  /55 (BP Location: Left arm)   Pulse 91   Temp 98.4  F (36.9  C) (Oral)   Resp 16   Ht 1.626 m (5' 4\")   Wt 82.9 kg (182 lb 12.8 oz)   SpO2 99%   BMI 31.38 kg/m    GENERAL: NAD  NECK: Supple and without lymphadenopathy. JVP unable to assess   CV: S1/S2 heard without murmur   RESPIRATORY: CTAB  GI: Soft and distended. No tenderness, rebound, guarding. No palpable organomegaly.   EXTREMITIES: Peripheral edema trace.   NEUROLOGIC: Follows commands, moves all four extremities   MUSCULOSKELETAL: No joint swelling or tenderness.   SKIN: No jaundice. No acute rashes or lesions.     ROS:   12-pt ROS otherwise negative except as noted above    PMH:  Past Medical History:   Diagnosis Date     CAD (coronary artery disease)      CHF " (congestive heart failure) (H)      CKD (chronic kidney disease), stage III (H)      Cortical cataract of both eyes      Diabetes (H)      Hyperlipidemia      Hypertension      Ischemic cardiomyopathy      Obesity      CHANTEL (obstructive sleep apnea)     occas cpap     Osteoarthritis        PSH:  Past Surgical History:   Procedure Laterality Date     C CABG, ARTERY-VEIN, THREE  02/2008     CATARACT IOL, RT/LT       COLONOSCOPY N/A 8/7/2019    Procedure: COLONOSCOPY, WITH POLYPECTOMY AND BIOPSY;  Surgeon: Chauncey Morataya MD;  Location: [facility]     CV INTRA-AORTIC BALLOON PUMP INSERTION N/A 7/14/2020    Procedure: RHC WITH LEAVE IN SWAN/IABP;  Surgeon: Sonny Saleh MD;  Location:  HEART CARDIAC CATH LAB     CV RIGHT HEART CATH N/A 3/25/2019    Procedure: CV RIGHT HEART CATH;  Surgeon: Moises Santos MD;  Location:  HEART CARDIAC CATH LAB     CV RIGHT HEART CATH N/A 7/10/2019    Procedure: CV RIGHT HEART CATH;  Surgeon: Jak Mccabe MD;  Location:  HEART CARDIAC CATH LAB     CV RIGHT HEART CATH N/A 7/8/2020    Procedure: Right Heart Cath with Leave In swan already has ICU load;  Surgeon: Jak Mccabe MD;  Location:  HEART CARDIAC CATH LAB     CV RIGHT HEART CATH N/A 7/14/2020    Procedure: CV RIGHT HEART CATH;  Surgeon: Sonny Saleh MD;  Location:  HEART CARDIAC CATH LAB     EXTRACTION(S) DENTAL Left 7/13/2020    Procedure: EXTRACTION, TOOTH #11, 12, 13, 15, and 29;  Surgeon: Monica Chao DDS;  Location: [facility] OR     IMPLANT AUTOMATIC IMPLANTABLE CARDIOVERTER DEFIBRILLATOR       INSERT INTRAAORTIC BALLOON PUMP N/A 7/16/2020    Procedure: Subclavian Intra-Aortic Balloon Pump Placement;  Surgeon: Allan Sparrow MD;  Location: [facility] OR     PHACOEMULSIFICATION CLEAR CORNEA WITH STANDARD IOL, VITRECTOMY PARSPLANA 25 GAGUE, COMBINED Left 12/10/2019    Procedure: PHACOEMULSIFICATION, CATARACT, CLEAR CORNEAL INCISION APPROACH, W STD INTRAOCULAR LENS IMPLANT INSERT + VITRECTOMY BY PARS  PLANA  USING 25-GAUGE INSTRUMENTS. ENDOLASER, INFUSION OF 20% SF6 GAS;  Surgeon: Triny Bunch MD;  Location: [facility]     PICC INSERTION Right 2020    basilic 44 cm total      TRANSPLANT HEART RECIPIENT N/A 2020    Procedure: REDO MEDIAN STERNOTOMY, TRANSPLANT, ORTHOTOPIC HEART, RECIPIENT, ON PUMP OXYGENATOR, REMOVAL OF CARDIAC DEFIBRILLATOR AND LEAD;  Surgeon: Griselli, Massimo, MD;  Location: [facility] OR       MEDICATIONS:  Prior to Admission Medications   Prescriptions Last Dose Informant Patient Reported? Taking?   aspirin 81 MG tablet  Self No No   Sig: Take 1 tablet (81 mg) by mouth daily   atorvastatin (LIPITOR) 40 MG tablet  Self No No   Sig: Take 1 tablet (40 mg) by mouth daily   blood glucose (ACCU-CHEK SMARTVIEW) test strip  Self No No   Sig: USE TO TEST THREE TIMES DAILY AS DIRECTED   blood glucose (NO BRAND SPECIFIED) test strip  Self No No   Sig: Use to test blood sugar 2 times daily or as directed.   blood glucose monitoring (ACCU-CHEK FELIPE SMARTVIEW) meter device kit  Self No No   Sig: Use to test blood sugar 3-4 times daily, as directed.   blood glucose monitoring (ONE TOUCH DELICA) lancets  Self No No   Sig: Use to test blood sugars 2 times daily.   clopidogrel (PLAVIX) 75 MG tablet  Self No No   Sig: Take 1 tablet (75 mg) by mouth daily   exenatide ER (BYDUREON) 2 MG pen  Self No No   Sig: Inject 2 mg Subcutaneous every 7 days   furosemide (LASIX) 20 MG tablet  Self No No   Sig: Take 2 tablets (40 mg) by mouth 2 times daily   glimepiride (AMARYL) 4 MG tablet  Self No No   Sig: Take 1 tablet (4 mg) by mouth every morning (before breakfast)   hydrALAZINE (APRESOLINE) 25 MG tablet  Self No No   Sig: Take 1 tablet (25 mg) by mouth 3 times daily   insulin glargine (LANTUS SOLOSTAR) 100 UNIT/ML pen  Self No No   Sig: Take 8 units in the morning and 40 units in the evening   insulin pen needle (B-D U/F) 31G X 5 MM miscellaneous  Self No No   Si Units by Device route 2 times daily Use 2  pen needles daily or as directed.   isosorbide mononitrate (IMDUR) 60 MG 24 hr tablet  Self No No   Sig: Take 1 tablet (60 mg) by mouth daily   losartan (COZAAR) 50 MG tablet  Spouse/Significant Other No No   Sig: Take 1 tablet (50 mg) by mouth daily   nitroglycerin (NITROSTAT) 0.4 MG SL tablet  Self No No   Sig: Place 1 tablet (0.4 mg) under the tongue every 5 minutes as needed for chest pain If you are still having symptoms after 3 doses (15 minutes) call 911.   order for DME  Self No No   Sig: Auto CPAP 8-15 cmH20      Facility-Administered Medications Last Administration Doses Remaining   erythromycin (ROMYCIN) ophthalmic ointment None recorded    lidocaine (PF) (XYLOCAINE) 2 % injection 10 mL None recorded 1           ALLERGIES:     Allergies   Allergen Reactions     Heparin Heparin Induced Thrombocytopenia     HIT screen sent 7/18/2020. CORRINE confirmed positive     No Known Drug Allergies        FAMILY HISTORY:  Family History   Problem Relation Age of Onset     Diabetes Brother      Diabetes Sister      Diabetes Sister      Macular Degeneration No family hx of      Glaucoma No family hx of      Myocardial Infarction No family hx of      Kidney Disease No family hx of        SOCIAL HISTORY:  Social History     Socioeconomic History     Marital status:      Spouse name: Not on file     Number of children: 4     Years of education: Not on file     Highest education level: Not on file   Occupational History     Occupation:      Employer: [employer]     Employer: RETIRED   Social Needs     Financial resource strain: Not on file     Food insecurity     Worry: Not on file     Inability: Not on file     Transportation needs     Medical: Not on file     Non-medical: Not on file   Tobacco Use     Smoking status: Never Smoker     Smokeless tobacco: Never Used     Tobacco comment: Never smoked; non-smoking household   Substance and Sexual Activity     Alcohol use: No     Alcohol/week: 0.0 standard  drinks     Drug use: No     Sexual activity: Yes     Partners: Female   Lifestyle     Physical activity     Days per week: Not on file     Minutes per session: Not on file     Stress: Not on file   Relationships     Social connections     Talks on phone: Not on file     Gets together: Not on file     Attends Restoration service: Not on file     Active member of club or organization: Not on file     Attends meetings of clubs or organizations: Not on file     Relationship status: Not on file     Intimate partner violence     Fear of current or ex partner: Not on file     Emotionally abused: Not on file     Physically abused: Not on file     Forced sexual activity: Not on file   Other Topics Concern     Parent/sibling w/ CABG, MI or angioplasty before 65F 55M? No   Social History Narrative     Not on file       LABS:  BMP  Recent Labs   Lab 07/26/20  0545 07/25/20  0541 07/24/20  1631 07/24/20  1241  07/23/20  2109  07/23/20  0411    138 138 141   < > 137   < > 137   POTASSIUM 4.7 4.4 4.1 3.6   < > 4.3   < > 4.8   CHLORIDE 109 108 105 111*   < > 106   < > 107   CO2 26 24 24 24   < > 28   < > 27   BUN 37* 41* 39* 34*   < > 41*   < > 39*   CR 1.33* 1.40* 1.53* 1.29*   < > 1.31*   < > 1.32*   * 245* 250* 108*   < > 145*   < > 103*   BRYANNA 8.0* 7.7* 7.9* 6.9*   < > 8.1*   < > 8.3*   PHOS 2.4*  --   --  2.5  --  2.4*  --  2.7    < > = values in this interval not displayed.     CBC  Recent Labs   Lab 07/26/20  0545 07/25/20  0541 07/24/20  1241 07/24/20  0411  07/19/20  1613   WBC 9.2 6.6 9.4 8.5   < > 7.7   HGB 8.2* 7.7* 8.4* 7.7*   < > 10.8*   HCT 25.8* 24.7* 27.1* 24.1*   < > 34.0*   MCV 96 96 95 95   < > 96    172 144* 122*   < > 99*   LYMPH 1.2  --   --   --   --  7.9    < > = values in this interval not displayed.     INR  Recent Labs   Lab 07/26/20  1028 07/26/20  0545 07/25/20  1256 07/25/20  0541  07/20/20  1610 07/20/20  0506 07/20/20  0232 07/20/20  0018   INR  --   --   --   --   --  1.35* 1.35*  1.52* 1.59*   PTT 41* 40* 49* 95*   < >  --  37 36 30    < > = values in this interval not displayed.     IMAGING:    RHC 6/18/20     RA 20/26/18  RV 85/28  PA 82/45/58  PCWP 45  Cardiac output by Jacy: 3.85 L/min (indexed to 2.09 L/min/m2)  Cardiac output by thermodilution: 3.63 L/min (indexed to 1.97 L/min/m2)  SVR (by TD CO): 1123  PVR (by TD CO): 7.4    Angiogram 6/18/20   3 vessel CAD s/p 3V CABG in 2008 (LIMA-LAD, SVG-OM1, SVG-RPDA)  2. Known proximal SVG-RPDA occlusion  3. Presumed occlusion of SVG-OM1 (unable to locate on non-selective aortic root shot)  4. Patent LIMA-LAD with collateralization of LAD to PDA    Echocardiogram ( 3/11/2019)   Moderate to severe left ventricular dilation is present.  Severely (EF 10-20%) reduced left ventricular function is present.  No significant valve dysfunction.  Compared to study done 6/5/17 there is no significant change in LV size and  systolic function    Echocardiogram ( 7/5/2020)   Interpretation Summary  Severely (EF 10-20%) reduced left ventricular function is present. LVEF 15%  based on biplane 2D tracing. Severe left ventricular dilation is present.  LVIDd:6.8 cm. Grade III or advanced diastolic dysfunction.  The right ventricle is normal size.  Global right ventricular function is moderately reduced.  No significant valvular abnormalities were noted.  IVC diameter and respiratory changes fall into an intermediate range  suggesting an RA pressure of 8 mmHg.  Mild pulmonary hypertension is present.     This study was compared with the study from 3/25/2019. RV function has  worsened, LV size and function appear similar.  Devices  ICD ([employer]- 4/2/2015 )     I have reviewed today's vital signs, notes, medications, labs and imaging.  I have also seen and examined the patient and agree with the findings and plan as outlined above.  Pt with spouse at bedside.  VSS with lungs clear and tachy S1 and S2.  Labs with Cr 1.4.  Assessment: Pt with OHT, POD #6 on  simulect protocol.  Will begin tac today, continue valcyte and bactrim on Monday.  Bx and RHC on Monday and will obtain TTE.     James Terrazas MD, PhD  Professor, Heart Failure and Cardiac Transplantation  [facility]

## 2020-07-26 NOTE — PROGRESS NOTES
Cardiovascular Surgery Progress Note  07/26/2020  Surgeon: Dr. Griselli           Assessment and Plan:     Lucian Henderson Sr. is a 64 year old male with a PMH of end-stage ischemic cardiomyopathy, CAD s/p CABG 2008, DMT2, who was admitted to [facility] 07/03 for heart failure exacerbation with cardiogenic shock, underwent right subclavian IABP insertion 07/16 with Dr. Sparrow, and then heart transplant 07/20 with Dr. Griselli. Hospital course notable for HIT+ 07/19, underwent PLEX prior to transplant    Cardiovascular:   S/p heart transplant  Primary graft dysfunction, improved  - TTE 07/20 with EF 20-25%, severe RV dysfunction, suspected due to ischemic time. TTE 07/21 with EF improved to 50-55%. CI robust off pressors/inotrope 07/24  - RHC/biopsy Monday 07/27 per Cards2  - SBP 120s-140s. Consider hydralazine  - Appears mildly hypervolemic. Filling pressures near normal 07/24. Lasix 20 mg PO x1 (reviewed with cardiology)  - Chest tubes: output 160, removed 7/26  - TPW: HR NSR 90s-100. TPW at backup rate 90. Terbutaline 10 mg q6H, leave wires today per cards2  Immunosuppression per Cards2  - Simulect induction  - MMF 1500 mg BID  - Tacrolimus start 07/26  - Prednisone taper per cardiology  Serologies/Prophylaxis per Cards2  - CMV: R+ / D+  - EBV: R+ / D+  - Valcyte 450 mg daily 7/24  - Bacrtim 07/26   - Nystatin s/s  - Aspirin 81 mg daily  - Consider starting statin (LFTs 7/25 WNL)    Pulmonary:  Postop mechanical ventilation, resolved  CHANTEL  - Extubated POD 2; Saturating well on RA.   - Supplemental O2 PRN to keep sats > 92%. Wean off as tolerated.  - Pulm toilet, IS, activity and deep breathing  - Resume CPAP at night, patient requests to use hospital CPAP    Neurology / MSK:  Postop pain  - Tylenol scheduled  - Oxycodone PRN  - Dilaudid IV PRN  - Robaxin PRN     / Renal:  CKD 3  - Cr 1.33, trend daily  - Avoid nephrotoxins  - Diuresis as above  Urinary retention  - Tamsulosin 0.4 mg daily    GI / FEN:  "  Nutrition  - Diabetic diet, continue bowel regimen    Endocrine:  DMT2  Stress/medication induced induced hyperglycemia  - Consult to endocrine for blood sugar management    Infectious Disease:  Leukocytosis, resolved  Donor Streptococcus salivarius bacteremia  - WBC WNL, afebrile, no signs or symptoms of infection  - ID following. Completed perioperative antibiotics. Ceftriaxone 2g q24H for donor bacteremia  - Blood culture 7/21 NG    Hematology:   Acute postop blood loss anemia and thrombocytopenia  - Hgb 8.2; Plt 216, no signs or symptoms of active bleeding  HIT+  RUE DVT  - HIT+ 7/19 S/p PLEX 07/19  - US UE 07/22 positve for non-occlusive RIJ DVT, and R cephalic vein occlusive thrombus  - Bival infusion, plan to transition to fondaparinux 7/27 (post biopsy)  - No heparin products  - Avoid PICC placement if possible    Anticoagulation:   - Aspirin 81 mg daily  - Bival infusion, plan to transition to fondaparinux 7/27 (post biopsy)    Prophylaxis:   - Stress ulcer prophylaxis: Pantoprazole 40 mg daily  - DVT prophylaxis: Bival, SCD    Disposition:   - Transferred to  on 07/24  - Rehab recommending likely discharge to ARU/TCU pending progress      Discussed with CVTS Fellow as needed.  Staff surgeons have been informed of changes through both verbal and written communication.      Trixie Quintanilla PA-C  Cardiothoracic Surgery  Pager [phone]            Interval History:   Up walking, denies SOB, but felt easily fatigued. Pain controlled.         Physical Exam:   Blood pressure 131/55, pulse 91, temperature 98.4  F (36.9  C), temperature source Oral, resp. rate 16, height 1.626 m (5' 4\"), weight 82.9 kg (182 lb 12.8 oz), SpO2 99 %.  Vitals:    07/23/20 0400 07/24/20 0600 07/26/20 0358   Weight: 84.8 kg (186 lb 15.2 oz) 83 kg (182 lb 15.7 oz) 82.9 kg (182 lb 12.8 oz)          Gen: NAD, resting in bed  CV: tachycardic, regular, S1S2 normal, no murmurs, rubs, or gallops.  Pulm: diminished bases, no rhonchi or " wheezes  Abd: soft, non-tender, no guarding, +BS  Ext: no lower extremity edema  Incision: sternal incision clean, dry, intact, no erythema; left ICD pocket clean, dry, intact, no erythema or drainage, right subclavian IABP site incision clean, dry, intact, no eythema or drainage  Chest Tube sites: dressings clean and dry, serosanguinous, no air leak, output 160 ml  Neuro: grossly normal  Psych: calm, cooperative            Data:    Imaging:  reviewed recent imaging  No results found for this or any previous visit (from the past 24 hour(s)).      Labs:  BMP  Recent Labs   Lab 07/26/20  0545 07/25/20  0541 07/24/20  1631 07/24/20  1241    138 138 141   POTASSIUM 4.7 4.4 4.1 3.6   CHLORIDE 109 108 105 111*   BRYANNA 8.0* 7.7* 7.9* 6.9*   CO2 26 24 24 24   BUN 37* 41* 39* 34*   CR 1.33* 1.40* 1.53* 1.29*   * 245* 250* 108*     CBC  Recent Labs   Lab 07/26/20  0545 07/25/20  0541 07/24/20  1241 07/24/20  0411   WBC 9.2 6.6 9.4 8.5   RBC 2.68* 2.58* 2.84* 2.55*   HGB 8.2* 7.7* 8.4* 7.7*   HCT 25.8* 24.7* 27.1* 24.1*   MCV 96 96 95 95   MCH 30.6 29.8 29.6 30.2   MCHC 31.8 31.2* 31.0* 32.0   RDW 16.2* 16.0* 15.9* 16.1*    172 144* 122*     INR  Recent Labs   Lab 07/20/20  1610 07/20/20  0506 07/20/20  0232 07/20/20  0018   INR 1.35* 1.35* 1.52* 1.59*      Liver Function Studies -   Recent Labs   Lab Test 07/22/20  0414   PROTTOTAL 5.5*   ALBUMIN 2.4*   BILITOTAL 0.4   ALKPHOS 75   AST 28   ALT 25     GLUCOSE:   Recent Labs   Lab 07/26/20  0545 07/26/20  0355 07/25/20  2151 07/25/20  1800 07/25/20  1410 07/25/20  0957 07/25/20  0541 07/25/20  0306  07/24/20  1631  07/24/20  1241  07/24/20  0411  07/23/20  2109   *  --   --   --   --   --  245*  --   --  250*  --  108*  --  92  --  145*   BGM  --  153* 155* 229* 284* 260*  --  250*   < >  --    < >  --    < >  --    < >  --     < > = values in this interval not displayed.

## 2020-07-26 NOTE — PLAN OF CARE
Problem: Adult Inpatient Plan of Care  Goal: Plan of Care Review  7/26/2020 0637 by Cintia Keller, RN  Outcome: No Change  Flowsheets (Taken 7/26/2020 0000)  Plan of Care Reviewed With: patient      Pt A&Ox4. Up with 1 assist. VSS. TPM AAI 90. HR 90's-100's. Angiomax gtt increased to 0.05. LS clear on RA. Phos replacement ordered from pharmacy. Continue with POC.

## 2020-07-26 NOTE — PROGRESS NOTES
Diabetes Consult Daily  Progress Note          Assessment/Plan:     Lucian Henderson Sr. is a 64 year old male with a PMH of end-stage ischemic cardiomyopathy, CAD s/p CABG 2008, DMT2, who was admitted to [facility] 07/03 for heart failure exacerbation with cardiogenic shock, underwent right subclavian IABP insertion 07/16 with Dr. Sparrow, and then heart transplant 07/20 with Dr. Griselli. 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      He is doing well and no complications appreciated at this point.  His BG is within safe range, but slightly above goal.     Plan    Will increase NPH insulin dose from 30 to 33 units twice daily to be given concurrent with Prednisone.  GIVE HALF DOSE (15 u) MORNING OF 7/27 as NPO.  Continue current correction scale.  Increase to 12 units Novolog with breakfast, continue 10 with lunch and dinner.    Continue bedtime and overnight correction for any BG >180 to promote healing.   Start counting carbohydrates; appears will need carbohydrate coverage.        Outpatient diabetes follow up: Recommend video visit 2-7 days p discharge.  Msg p clin coord HIGH urgency to schedule.   Plan discussed with patient, bedside RN, and James from CV surgery re card cath tomorrow.  .           Interval History:     The last 24 hours progress and nursing notes reviewed.  BG improving but above goal this morning. He has received 43 units of correction in last 24 hours.      BG within reasonable range slightly above goal with fasting  this morning.   - 193 since NPH increased.      I reached wife and she advised WAS taking 48 units daily Lantus, together with Januvia and Amaryl PTA.  He had tried substituting Bydureon for Januvia but had loose stools, bloating abdominal pain and did not like it.  He stopped and resumed Januvia after perhaps one month.    Has some back pain with walk this am, thinks due to prolonged immobility.     He  "and wife questions about insulin and needing carb coverage at home, how will they do it.    Lucian WILL BE NPO after MN in anticipation of cardiac cath tomorrow morning.      Recent Labs   Lab 07/26/20  1005 07/26/20  0545 07/26/20  0355 07/25/20  2151 07/25/20  1800 07/25/20  1410 07/25/20  0957 07/25/20  0541  07/24/20  1631  07/24/20  1241  07/24/20  0411  07/23/20  2109   GLC  --  166*  --   --   --   --   --  245*  --  250*  --  108*  --  92  --  145*   *  --  153* 155* 229* 284* 260*  --    < >  --    < >  --    < >  --    < >  --     < > = values in this interval not displayed.           Nutrition:     Orders Placed This Encounter      Consistent Carbohydrate Diet 1631-8943 Calories: High Consistent CHO (4-7 CHO units/meal)            PTA Regimen:     Lantus 40 units qpm, 8 qam.  Januvia 50 mg qam  Amaryl 4 mg  Reports no salt, no sugar, fasting BG 85 - 110.           Review of Systems:   See interval hx          Medications:   Prednisone 30 mg bid         Physical Exam:     Gen: Alert, in NAD  - Wife Shivani visiting.  HEENT: Eyes clear, eom intact.  Moist membranes  Resp: Unlabored  Neuro: oriented x3, communicating clearly. TF/TC  fluid and coherent  Psych: mood \"good\"  Affect bright and warm.   /55 (BP Location: Left arm)   Pulse 91   Temp 98.4  F (36.9  C) (Oral)   Resp 16   Ht 1.626 m (5' 4\")   Wt 82.9 kg (182 lb 12.8 oz)   SpO2 99%   BMI 31.38 kg/m      Remainder of physical exam not possible due to Covid 19 precautions         Data:     Lab Results   Component Value Date    A1C 8.2 07/03/2020    A1C 7.9 07/08/2019    A1C 8.5 03/11/2019    A1C 7.6 10/16/2018    A1C 9.8 09/06/2017              CBC RESULTS:   Recent Labs   Lab Test 07/26/20  0545   WBC 9.2   RBC 2.68*   HGB 8.2*   HCT 25.8*   MCV 96   MCH 30.6   MCHC 31.8   RDW 16.2*        Recent Labs   Lab Test 07/26/20  0545 07/25/20  0541    138   POTASSIUM 4.7 4.4   CHLORIDE 109 108   CO2 26 24   ANIONGAP 5 6   GLC " 166* 245*   BUN 37* 41*   CR 1.33* 1.40*   BRYANNA 8.0* 7.7*     Liver Function Studies -   Recent Labs   Lab Test 07/25/20  0541   PROTTOTAL 5.4*   ALBUMIN 2.2*   BILITOTAL 0.4   ALKPHOS 82   AST 14   ALT 21     Lab Results   Component Value Date    INR 1.35 07/20/2020    INR 1.35 07/20/2020    INR 1.52 07/20/2020    INR 1.59 07/20/2020    INR 1.23 07/19/2020    INR 1.18 07/19/2020    INR 1.18 07/19/2020    INR 1.05 07/13/2020    INR 1.12 07/03/2020    INR 1.09 07/02/2020    INR 1.05 07/08/2019    INR 1.04 03/25/2019       15 minutes with patient, 10 minutes care coordination, 15 floor time    It is my privilege to be involved in the care of the above patient.     Marisabel Prieto PA-C, MPAS  [facility]  Diabetes, Endocrinology, and Metabolism  [phone] Appointments/Nurse  [phone] Fax  [phone] pager  [phone] nurse line

## 2020-07-26 NOTE — PLAN OF CARE
"Discharge Planner PT   Patient plan for discharge: home with 24hr assist from family members, prefers OP CR over home PT.   Current status: Min-A sit<>stand progressing to CGA with mod cues for sit <> stand technique and sternal precautions. Ambulated ' with seated rests using FWW, limited by back pain, will try to coordinate pain meds. RN setting up aqua K-pad for pt. Step ups on 4\" aerobic step, 5 total reps with CGA.  Barriers to return to prior living situation: fatigue, back pain, balance, endurance, LE weakness, medical, stair negotiation  Recommendations for discharge: Anticipate home and 24hr assist with OP CR, however, must demo 3 stairs safely.   Rationale for recommendations: Pt IND at baseline and will need ongoing skilled PT intervention to improve independence and safety with functional mobility skills prior to returning home. Has support at home.       Entered by: James Mejia 07/26/2020 12:06 PM      "

## 2020-07-26 NOTE — PLAN OF CARE
OT 6C  Discharge Planner OT   Patient plan for discharge: home with assist from family  Current status: Mod A supine to sitting EOB. SBA for ADLs standing at sink. CGA transfers and ambulation with FWW.  Barriers to return to prior living situation: acute medical needs, deconditioning  Recommendations for discharge: home with assist  Rationale for recommendations: Pt may benefit from skilled therapy to increase independence with ADLs and increase activity tolerance for safe return home with assist.       Entered by: Yin Polanco 07/26/2020 8:41 AM

## 2020-07-26 NOTE — PLAN OF CARE
Pt s/p heart transplant 7/20. Still has 3 CTs and a temporary pacer. Continues on an angiomax drip at 0.04 mg/kg/min. Pacer set at AAI. SR 90s-100s with occasional pacing. Covered for elevated blood sugars. Moves well with assist of 1. Walks into the bathroom with SBA. Denies pain. Mid sternal incision healing well.

## 2020-07-27 ENCOUNTER — APPOINTMENT (OUTPATIENT)
Dept: CARDIOLOGY | Facility: CLINIC | Age: 65
DRG: 001 | End: 2020-07-27
Attending: STUDENT IN AN ORGANIZED HEALTH CARE EDUCATION/TRAINING PROGRAM
Payer: COMMERCIAL

## 2020-07-27 ENCOUNTER — APPOINTMENT (OUTPATIENT)
Dept: OCCUPATIONAL THERAPY | Facility: CLINIC | Age: 65
DRG: 001 | End: 2020-07-27
Attending: INTERNAL MEDICINE
Payer: COMMERCIAL

## 2020-07-27 ENCOUNTER — APPOINTMENT (OUTPATIENT)
Dept: GENERAL RADIOLOGY | Facility: CLINIC | Age: 65
DRG: 001 | End: 2020-07-27
Attending: PHYSICIAN ASSISTANT
Payer: COMMERCIAL

## 2020-07-27 DIAGNOSIS — Z94.1 HEART REPLACED BY TRANSPLANT (H): ICD-10-CM

## 2020-07-27 DIAGNOSIS — Z11.59 ENCOUNTER FOR SCREENING FOR OTHER VIRAL DISEASES: Primary | ICD-10-CM

## 2020-07-27 LAB
ANION GAP SERPL CALCULATED.3IONS-SCNC: 6 MMOL/L (ref 3–14)
APTT PPP: 47 SEC (ref 22–37)
APTT PPP: 55 SEC (ref 22–37)
BACTERIA SPEC CULT: NO GROWTH
BUN SERPL-MCNC: 32 MG/DL (ref 7–30)
CALCIUM SERPL-MCNC: 7.8 MG/DL (ref 8.5–10.1)
CHLORIDE SERPL-SCNC: 109 MMOL/L (ref 94–109)
CO2 SERPL-SCNC: 25 MMOL/L (ref 20–32)
CREAT SERPL-MCNC: 1.31 MG/DL (ref 0.66–1.25)
ERYTHROCYTE [DISTWIDTH] IN BLOOD BY AUTOMATED COUNT: 16.6 % (ref 10–15)
GFR SERPL CREATININE-BSD FRML MDRD: 56 ML/MIN/{1.73_M2}
GLUCOSE BLDC GLUCOMTR-MCNC: 150 MG/DL (ref 70–99)
GLUCOSE BLDC GLUCOMTR-MCNC: 152 MG/DL (ref 70–99)
GLUCOSE BLDC GLUCOMTR-MCNC: 191 MG/DL (ref 70–99)
GLUCOSE BLDC GLUCOMTR-MCNC: 192 MG/DL (ref 70–99)
GLUCOSE SERPL-MCNC: 179 MG/DL (ref 70–99)
HCT VFR BLD AUTO: 24.7 % (ref 40–53)
HGB BLD-MCNC: 7.5 G/DL (ref 13.3–17.7)
MCH RBC QN AUTO: 29.8 PG (ref 26.5–33)
MCHC RBC AUTO-ENTMCNC: 30.4 G/DL (ref 31.5–36.5)
MCV RBC AUTO: 98 FL (ref 78–100)
PLATELET # BLD AUTO: 267 10E9/L (ref 150–450)
POTASSIUM SERPL-SCNC: 4.6 MMOL/L (ref 3.4–5.3)
RBC # BLD AUTO: 2.52 10E12/L (ref 4.4–5.9)
SODIUM SERPL-SCNC: 141 MMOL/L (ref 133–144)
SPECIMEN SOURCE: NORMAL
WBC # BLD AUTO: 9.1 10E9/L (ref 4–11)

## 2020-07-27 PROCEDURE — C1894 INTRO/SHEATH, NON-LASER: HCPCS | Performed by: INTERNAL MEDICINE

## 2020-07-27 PROCEDURE — 25000132 ZZH RX MED GY IP 250 OP 250 PS 637: Performed by: STUDENT IN AN ORGANIZED HEALTH CARE EDUCATION/TRAINING PROGRAM

## 2020-07-27 PROCEDURE — 25000132 ZZH RX MED GY IP 250 OP 250 PS 637: Performed by: SURGERY

## 2020-07-27 PROCEDURE — 25000132 ZZH RX MED GY IP 250 OP 250 PS 637: Performed by: PHYSICIAN ASSISTANT

## 2020-07-27 PROCEDURE — 97535 SELF CARE MNGMENT TRAINING: CPT | Mod: GO | Performed by: OCCUPATIONAL THERAPIST

## 2020-07-27 PROCEDURE — 36415 COLL VENOUS BLD VENIPUNCTURE: CPT

## 2020-07-27 PROCEDURE — 93321 DOPPLER ECHO F-UP/LMTD STD: CPT | Mod: 26 | Performed by: INTERNAL MEDICINE

## 2020-07-27 PROCEDURE — 88307 TISSUE EXAM BY PATHOLOGIST: CPT | Performed by: INTERNAL MEDICINE

## 2020-07-27 PROCEDURE — 02BK3ZX EXCISION OF RIGHT VENTRICLE, PERCUTANEOUS APPROACH, DIAGNOSTIC: ICD-10-PCS | Performed by: INTERNAL MEDICINE

## 2020-07-27 PROCEDURE — 99232 SBSQ HOSP IP/OBS MODERATE 35: CPT | Mod: 25 | Performed by: INTERNAL MEDICINE

## 2020-07-27 PROCEDURE — 93308 TTE F-UP OR LMTD: CPT | Mod: 26 | Performed by: INTERNAL MEDICINE

## 2020-07-27 PROCEDURE — 21400000 ZZH R&B CCU UMMC

## 2020-07-27 PROCEDURE — 85027 COMPLETE CBC AUTOMATED: CPT | Performed by: THORACIC SURGERY (CARDIOTHORACIC VASCULAR SURGERY)

## 2020-07-27 PROCEDURE — 88350 IMFLUOR EA ADDL 1ANTB STN PX: CPT | Performed by: INTERNAL MEDICINE

## 2020-07-27 PROCEDURE — 25000132 ZZH RX MED GY IP 250 OP 250 PS 637: Performed by: INTERNAL MEDICINE

## 2020-07-27 PROCEDURE — 40000275 ZZH STATISTIC RCP TIME EA 10 MIN

## 2020-07-27 PROCEDURE — 36415 COLL VENOUS BLD VENIPUNCTURE: CPT | Performed by: THORACIC SURGERY (CARDIOTHORACIC VASCULAR SURGERY)

## 2020-07-27 PROCEDURE — 25000125 ZZHC RX 250: Performed by: INTERNAL MEDICINE

## 2020-07-27 PROCEDURE — 4A023N6 MEASUREMENT OF CARDIAC SAMPLING AND PRESSURE, RIGHT HEART, PERCUTANEOUS APPROACH: ICD-10-PCS | Performed by: INTERNAL MEDICINE

## 2020-07-27 PROCEDURE — 25000132 ZZH RX MED GY IP 250 OP 250 PS 637: Performed by: PEDIATRICS

## 2020-07-27 PROCEDURE — 93505 ENDOMYOCARDIAL BIOPSY: CPT | Performed by: INTERNAL MEDICINE

## 2020-07-27 PROCEDURE — 25000131 ZZH RX MED GY IP 250 OP 636 PS 637: Performed by: INTERNAL MEDICINE

## 2020-07-27 PROCEDURE — 25000131 ZZH RX MED GY IP 250 OP 636 PS 637: Performed by: STUDENT IN AN ORGANIZED HEALTH CARE EDUCATION/TRAINING PROGRAM

## 2020-07-27 PROCEDURE — 80048 BASIC METABOLIC PNL TOTAL CA: CPT | Performed by: THORACIC SURGERY (CARDIOTHORACIC VASCULAR SURGERY)

## 2020-07-27 PROCEDURE — 88346 IMFLUOR 1ST 1ANTB STAIN PX: CPT | Performed by: INTERNAL MEDICINE

## 2020-07-27 PROCEDURE — 71046 X-RAY EXAM CHEST 2 VIEWS: CPT

## 2020-07-27 PROCEDURE — 85730 THROMBOPLASTIN TIME PARTIAL: CPT | Performed by: THORACIC SURGERY (CARDIOTHORACIC VASCULAR SURGERY)

## 2020-07-27 PROCEDURE — 93325 DOPPLER ECHO COLOR FLOW MAPG: CPT | Mod: 26 | Performed by: INTERNAL MEDICINE

## 2020-07-27 PROCEDURE — 93325 DOPPLER ECHO COLOR FLOW MAPG: CPT

## 2020-07-27 PROCEDURE — 00000146 ZZHCL STATISTIC GLUCOSE BY METER IP

## 2020-07-27 PROCEDURE — 27210794 ZZH OR GENERAL SUPPLY STERILE: Performed by: INTERNAL MEDICINE

## 2020-07-27 PROCEDURE — 93505 ENDOMYOCARDIAL BIOPSY: CPT | Mod: 26 | Performed by: INTERNAL MEDICINE

## 2020-07-27 RX ORDER — LIDOCAINE 40 MG/G
CREAM TOPICAL
Status: DISCONTINUED | OUTPATIENT
Start: 2020-07-27 | End: 2020-07-27 | Stop reason: HOSPADM

## 2020-07-27 RX ADMIN — PANTOPRAZOLE SODIUM 40 MG: 40 TABLET, DELAYED RELEASE ORAL at 08:31

## 2020-07-27 RX ADMIN — MYCOPHENOLATE MOFETIL 1500 MG: 250 CAPSULE ORAL at 17:23

## 2020-07-27 RX ADMIN — TERBUTALINE SULFATE 10 MG: 5 TABLET ORAL at 00:11

## 2020-07-27 RX ADMIN — DOCUSATE SODIUM AND SENNOSIDES 1 TABLET: 8.6; 5 TABLET ORAL at 08:32

## 2020-07-27 RX ADMIN — SULFAMETHOXAZOLE AND TRIMETHOPRIM 1 TABLET: 400; 80 TABLET ORAL at 08:30

## 2020-07-27 RX ADMIN — TERBUTALINE SULFATE 10 MG: 5 TABLET ORAL at 17:23

## 2020-07-27 RX ADMIN — DOCUSATE SODIUM AND SENNOSIDES 1 TABLET: 8.6; 5 TABLET ORAL at 20:34

## 2020-07-27 RX ADMIN — PREDNISONE 25 MG: 5 TABLET ORAL at 20:34

## 2020-07-27 RX ADMIN — Medication 12.5 MG: at 21:53

## 2020-07-27 RX ADMIN — NYSTATIN 1000000 UNITS: 100000 SUSPENSION ORAL at 08:31

## 2020-07-27 RX ADMIN — MYCOPHENOLATE MOFETIL 1500 MG: 250 CAPSULE ORAL at 08:30

## 2020-07-27 RX ADMIN — Medication 12.5 MG: at 15:47

## 2020-07-27 RX ADMIN — TAMSULOSIN HYDROCHLORIDE 0.4 MG: 0.4 CAPSULE ORAL at 08:30

## 2020-07-27 RX ADMIN — TACROLIMUS 0.5 MG: 0.5 CAPSULE, GELATIN COATED ORAL at 17:23

## 2020-07-27 RX ADMIN — VALGANCICLOVIR 450 MG: 450 TABLET, FILM COATED ORAL at 08:31

## 2020-07-27 RX ADMIN — TERBUTALINE SULFATE 10 MG: 5 TABLET ORAL at 12:00

## 2020-07-27 RX ADMIN — INSULIN ASPART 4 UNITS: 100 INJECTION, SOLUTION INTRAVENOUS; SUBCUTANEOUS at 17:25

## 2020-07-27 RX ADMIN — NYSTATIN 1000000 UNITS: 100000 SUSPENSION ORAL at 20:34

## 2020-07-27 RX ADMIN — Medication 12.5 MG: at 09:09

## 2020-07-27 RX ADMIN — NYSTATIN 1000000 UNITS: 100000 SUSPENSION ORAL at 12:00

## 2020-07-27 RX ADMIN — ACETAMINOPHEN 975 MG: 325 TABLET, FILM COATED ORAL at 20:34

## 2020-07-27 RX ADMIN — TACROLIMUS 0.5 MG: 0.5 CAPSULE, GELATIN COATED ORAL at 08:31

## 2020-07-27 RX ADMIN — NYSTATIN 1000000 UNITS: 100000 SUSPENSION ORAL at 15:47

## 2020-07-27 RX ADMIN — INSULIN ASPART 4 UNITS: 100 INJECTION, SOLUTION INTRAVENOUS; SUBCUTANEOUS at 13:14

## 2020-07-27 RX ADMIN — METHOCARBAMOL 500 MG: 500 TABLET, FILM COATED ORAL at 15:47

## 2020-07-27 RX ADMIN — TERBUTALINE SULFATE 10 MG: 5 TABLET ORAL at 06:04

## 2020-07-27 RX ADMIN — TERBUTALINE SULFATE 10 MG: 5 TABLET ORAL at 23:27

## 2020-07-27 RX ADMIN — ASPIRIN 81 MG CHEWABLE TABLET 81 MG: 81 TABLET CHEWABLE at 08:31

## 2020-07-27 RX ADMIN — ACETAMINOPHEN 975 MG: 325 TABLET, FILM COATED ORAL at 04:29

## 2020-07-27 RX ADMIN — PREDNISONE 25 MG: 5 TABLET ORAL at 08:31

## 2020-07-27 ASSESSMENT — ACTIVITIES OF DAILY LIVING (ADL)
ADLS_ACUITY_SCORE: 12
ADLS_ACUITY_SCORE: 14
ADLS_ACUITY_SCORE: 12

## 2020-07-27 ASSESSMENT — PAIN DESCRIPTION - DESCRIPTORS: DESCRIPTORS: ACHING;SORE

## 2020-07-27 ASSESSMENT — MIFFLIN-ST. JEOR: SCORE: 1521.08

## 2020-07-27 NOTE — PROGRESS NOTES
[facility]   Cardiology II Service / Advanced Heart Failure  Progress note    Lucian Morillo  : 1955  MRN # [id number]    ADMIT DATE: 7/3/2020    Changes today  - Lasix 20 mg PO once   - C/w tacro 0.5 mg PO bid and check trough tomorrow   - hydralazine for afterload reduction   - Bivalirudin for HIT, will transition to fondaparinux in coming days once stable from bleeding standpoint    - RHC and biopsy performed today RA 9 PA 25 PCW 15 CO 6.2     - TTE complete today showed normal graft function and loculated 1.7 cm pericardial effusion     ASSESSMENT & PLAN:  Lucian Henderson Sr. is a 64 year old male with a PMH of HTN, DMII, obesity, CKD, CHANTEL, ischemic cardiomyopathy and severe LV systolic dysfunction s/p 3V CABG () and primary prevention ICD ( 4/2/15). Currently he presents with 3 days of worsening fatigue and SOB with minimal exertion. Patient claims he could walk 1-2 blocks last week but has been barely able to ambulate home recently.     Admitted for possible LVAD/Transplant work up. IABP and listed status 2 for heart transplant . HIT + , underwent PLEX, s/p . Post operative course complicated by graft dysfunction and VT. Graft function back to near normal , LVEF 50-55%.  Normal on      # ICM s/p 3V CABG () and primary prevention ICD ( 4/2/15)  # HFrEF Stage D, NYHA III-IV s/p OHT   # Ambulatory cardiogenic shock  # Arrhythmia: HF associated VT/VF, VT (, post op from OHT)   # Graft dysfunction (EF 20-25%)   Graft Function: TTE POD#1 w/ LVEF 20-25%, RV severely reduced, suspect 2/2 to ischemic time, minimal change in LV fxn , graft function recovered   --Volume: euvolemia, lasix 20 mg PO today   --BP: MAP goal >65 , hydralazine started today   --HR: Goal >100,   -- continue with terbutaline 10 mg PO Q6H   CAV prophylaxis: ASA, will start pravastatin in coming days     Pressors/Inotropes:  -none  "    Immunosuppression:  -s/p Simulect on POD#0, second dose POD#4 (7/24)  -MMF 1500 mg BID  -s/p solumedrol 125 mg q8h  --> transitioned to prednisone  w/ taper   - Tacro started on 7/26: 0.5 mg PO bid   -routine endomyocardial bx 7 days post transplant (7/27) - RA 9 PA 25 PCW 15 CO 6.2      Antibiotics:   - vancomycin (will get 3 days)  - cefepime -> ceftriaxone 7/21  - Nystatin Swish&Swallow  -  bactrim started single strength daily   - CMV: R+ / D+, start valcyte 450 mg qday 7/24  - EBV: R+ / D+  - f/u blood cx, NGTD   - donor blood cx growing strep salivarius in 1/2 bottles  -->transplant ID consult 7/21, rec narrow to ceftriaxone     # Atrial Flutter with RVR  Patient went into Atrial flutter with RVR with rates around 150 overnight on 7/7/2020 at around 2 am. Converted to NSR with amiodarone, which was stopped 7/19 given OHT.     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 Terrazas.     Johan Ford     ..........................................................................................................................................................  Subjective:  SARITA overnight. No arrhythmias. Extubated. Denies any SOB, n/v or abdominal pain.     PHYSICAL EXAM:  BP (!) 145/61 (BP Location: Left arm)   Pulse 91   Temp 98.5  F (36.9  C) (Oral)   Resp 18   Ht 1.626 m (5' 4\")   Wt 83 kg (183 lb)   SpO2 99%   BMI 31.41 kg/m    GENERAL: NAD  NECK: Supple and without lymphadenopathy. JVP unable to assess   CV: S1/S2 heard without murmur   RESPIRATORY: CTAB  GI: Soft and distended. No tenderness, rebound, guarding. No palpable organomegaly.   EXTREMITIES: Peripheral edema trace.   NEUROLOGIC: Follows commands, moves all four extremities   MUSCULOSKELETAL: No joint swelling or tenderness.   SKIN: No jaundice. No acute rashes or lesions.     ROS: "   12-pt ROS otherwise negative except as noted above    PMH:  Past Medical History:   Diagnosis Date     CAD (coronary artery disease)      CHF (congestive heart failure) (H)      CKD (chronic kidney disease), stage III (H)      Cortical cataract of both eyes      Diabetes (H)      Hyperlipidemia      Hypertension      Ischemic cardiomyopathy      Obesity      CHANTEL (obstructive sleep apnea)     occas cpap     Osteoarthritis        PSH:  Past Surgical History:   Procedure Laterality Date     C CABG, ARTERY-VEIN, THREE  02/2008     CATARACT IOL, RT/LT       COLONOSCOPY N/A 8/7/2019    Procedure: COLONOSCOPY, WITH POLYPECTOMY AND BIOPSY;  Surgeon: Chauncey Morataya MD;  Location:  GI     CV ANGIOGRAM CORONARY GRAFT N/A 7/2/2020    Procedure: Angiogram Coronary Graft;  Surgeon: Alex Lantigua MD;  Location:  HEART CARDIAC CATH LAB     CV CORONARY ANGIOGRAM N/A 7/2/2020    Procedure: CV CORONARY ANGIOGRAM;  Surgeon: Alex Lantigua MD;  Location:  HEART CARDIAC CATH LAB     CV INTRA-AORTIC BALLOON PUMP INSERTION N/A 7/14/2020    Procedure: RHC WITH LEAVE IN AN/IABP;  Surgeon: Sonny Saleh MD;  Location:  HEART CARDIAC CATH LAB     CV RIGHT HEART CATH N/A 3/25/2019    Procedure: CV RIGHT HEART CATH;  Surgeon: Moises Santos MD;  Location:  HEART CARDIAC CATH LAB     CV RIGHT HEART CATH N/A 7/10/2019    Procedure: CV RIGHT HEART CATH;  Surgeon: Jak Mccabe MD;  [facility]     CV RIGHT HEART CATH N/A 7/8/2020    Procedure: Right Heart Cath with Leave In an already has ICU load;  Surgeon: Jak Mccabe MD;  [facility]: [facility] CARD[facility]AC CATH [facility]     CV RIGHT HEART CATH N/A 7/14/2020    Procedure: CV RIGHT HEART CATH;  Surgeon: Sonny Saleh MD;  Location:  HEART CARDIAC CATH LAB     CV RIGHT HEART CATH N/A 7/2/2020    Procedure: CV RIGHT HEART CATH;  Surgeon: Alex Lantigua MD;  Location:  HEART CARDIAC CATH LAB     EXTRACTION(S)  DENTAL Left 7/13/2020    Procedure: EXTRACTION, TOOTH #11, 12, 13, 15, and 29;  Surgeon: Monica Chao DDS;  Location: [facility] OR     IMPLANT AUTOMATIC IMPLANTABLE CARDIOVERTER DEFIBRILLATOR       INSERT INTRAAORTIC BALLOON PUMP N/A 7/16/2020    Procedure: Subclavian Intra-Aortic Balloon Pump Placement;  Surgeon: Allan Sparrow MD;  Location: [facility] OR     PHACOEMULSIFICATION CLEAR CORNEA WITH STANDARD IOL, VITRECTOMY PARSPLANA 25 GAGUE, COMBINED Left 12/10/2019    Procedure: PHACOEMULSIFICATION, CATARACT, CLEAR CORNEAL INCISION APPROACH, W STD INTRAOCULAR LENS IMPLANT INSERT + VITRECTOMY BY PARS PLANA  USING 25-GAUGE INSTRUMENTS. ENDOLASER, INFUSION OF 20% SF6 GAS;  Surgeon: Triny Bunch MD;  Location: [facility] OR     PICC INSERTION Right 07/11/2020    basilic 44 cm total      TRANSPLANT HEART RECIPIENT N/A 7/19/2020    Procedure: REDO MEDIAN STERNOTOMY, TRANSPLANT, ORTHOTOPIC HEART, RECIPIENT, ON PUMP OXYGENATOR, REMOVAL OF CARDIAC DEFIBRILLATOR AND LEAD;  Surgeon: Griselli, Massimo, MD;  Location: [facility] OR       MEDICATIONS:  Prior to Admission Medications   Prescriptions Last Dose Informant Patient Reported? Taking?   aspirin 81 MG tablet  Self No No   Sig: Take 1 tablet (81 mg) by mouth daily   atorvastatin (LIPITOR) 40 MG tablet  Self No No   Sig: Take 1 tablet (40 mg) by mouth daily   blood glucose (ACCU-CHEK SMARTVIEW) test strip  Self No No   Sig: USE TO TEST THREE TIMES DAILY AS DIRECTED   blood glucose (NO BRAND SPECIFIED) test strip  Self No No   Sig: Use to test blood sugar 2 times daily or as directed.   blood glucose monitoring (ACCU-CHEK FELIPE SMARTVIEW) meter device kit  Self No No   Sig: Use to test blood sugar 3-4 times daily, as directed.   blood glucose monitoring (ONE TOUCH DELICA) lancets  Self No No   Sig: Use to test blood sugars 2 times daily.   clopidogrel (PLAVIX) 75 MG tablet  Self No No   Sig: Take 1 tablet (75 mg) by mouth daily   exenatide ER (BYDUREON) 2 MG pen  Self No No   Sig:  Inject 2 mg Subcutaneous every 7 days   furosemide (LASIX) 20 MG tablet  Self No No   Sig: Take 2 tablets (40 mg) by mouth 2 times daily   glimepiride (AMARYL) 4 MG tablet  Self No No   Sig: Take 1 tablet (4 mg) by mouth every morning (before breakfast)   hydrALAZINE (APRESOLINE) 25 MG tablet  Self No No   Sig: Take 1 tablet (25 mg) by mouth 3 times daily   insulin glargine (LANTUS SOLOSTAR) 100 UNIT/ML pen  Self No No   Sig: Take 8 units in the morning and 40 units in the evening   insulin pen needle (B-D U/F) 31G X 5 MM miscellaneous  Self No No   Si Units by Device route 2 times daily Use 2 pen needles daily or as directed.   isosorbide mononitrate (IMDUR) 60 MG 24 hr tablet  Self No No   Sig: Take 1 tablet (60 mg) by mouth daily   losartan (COZAAR) 50 MG tablet  Spouse/Significant Other No No   Sig: Take 1 tablet (50 mg) by mouth daily   nitroglycerin (NITROSTAT) 0.4 MG SL tablet  Self No No   Sig: Place 1 tablet (0.4 mg) under the tongue every 5 minutes as needed for chest pain If you are still having symptoms after 3 doses (15 minutes) call 911.   order for DME  Self No No   Sig: Auto CPAP 8-15 cmH20      Facility-Administered Medications Last Administration Doses Remaining   erythromycin (ROMYCIN) ophthalmic ointment None recorded    lidocaine (PF) (XYLOCAINE) 2 % injection 10 mL None recorded 1           ALLERGIES:     Allergies   Allergen Reactions     Heparin Heparin Induced Thrombocytopenia     HIT screen sent 2020. CORRINE confirmed positive     No Known Drug Allergies        FAMILY HISTORY:  Family History   Problem Relation Age of Onset     Diabetes Brother      Diabetes Sister      Diabetes Sister      Macular Degeneration No family hx of      Glaucoma No family hx of      Myocardial Infarction No family hx of      Kidney Disease No family hx of        SOCIAL HISTORY:  Social History     Socioeconomic History     Marital status:      Spouse name: Not on file     Number of children: 4      Years of education: Not on file     Highest education level: Not on file   Occupational History     Occupation:      Employer: [employer]     Employer: RETIRED   Social Needs     Financial resource strain: Not on file     Food insecurity     Worry: Not on file     Inability: Not on file     Transportation needs     Medical: Not on file     Non-medical: Not on file   Tobacco Use     Smoking status: Never Smoker     Smokeless tobacco: Never Used     Tobacco comment: Never smoked; non-smoking household   Substance and Sexual Activity     Alcohol use: No     Alcohol/week: 0.0 standard drinks     Drug use: No     Sexual activity: Yes     Partners: Female   Lifestyle     Physical activity     Days per week: Not on file     Minutes per session: Not on file     Stress: Not on file   Relationships     Social connections     Talks on phone: Not on file     Gets together: Not on file     Attends Sabianism service: Not on file     Active member of club or organization: Not on file     Attends meetings of clubs or organizations: Not on file     Relationship status: Not on file     Intimate partner violence     Fear of current or ex partner: Not on file     Emotionally abused: Not on file     Physically abused: Not on file     Forced sexual activity: Not on file   Other Topics Concern     Parent/sibling w/ CABG, MI or angioplasty before 65F 55M? No   Social History Narrative     Not on file       LABS:  BMP  Recent Labs   Lab 07/27/20  0606 07/26/20  0545 07/25/20  0541 07/24/20  1631 07/24/20  1241  07/23/20  2109  07/23/20  0411    140 138 138 141   < > 137   < > 137   POTASSIUM 4.6 4.7 4.4 4.1 3.6   < > 4.3   < > 4.8   CHLORIDE 109 109 108 105 111*   < > 106   < > 107   CO2 25 26 24 24 24   < > 28   < > 27   BUN 32* 37* 41* 39* 34*   < > 41*   < > 39*   CR 1.31* 1.33* 1.40* 1.53* 1.29*   < > 1.31*   < > 1.32*   * 166* 245* 250* 108*   < > 145*   < > 103*   BRYANNA 7.8* 8.0* 7.7* 7.9* 6.9*   < > 8.1*    < > 8.3*   PHOS  --  2.4*  --   --  2.5  --  2.4*  --  2.7    < > = values in this interval not displayed.     CBC  Recent Labs   Lab 07/27/20  0606 07/26/20  0545 07/25/20  0541 07/24/20  1241   WBC 9.1 9.2 6.6 9.4   HGB 7.5* 8.2* 7.7* 8.4*   HCT 24.7* 25.8* 24.7* 27.1*   MCV 98 96 96 95    216 172 144*   LYMPH  --  1.2  --   --      INR  Recent Labs   Lab 07/27/20  0606 07/26/20  2212 07/26/20  1316 07/26/20  1028  07/20/20  1610   INR  --   --   --   --   --  1.35*   PTT 55* 51* 39* 41*   < >  --     < > = values in this interval not displayed.     IMAGING:    RHC 6/18/20     RA 20/26/18  RV 85/28  PA 82/45/58  PCWP 45  Cardiac output by Jacy: 3.85 L/min (indexed to 2.09 L/min/m2)  Cardiac output by thermodilution: 3.63 L/min (indexed to 1.97 L/min/m2)  SVR (by TD CO): 1123  PVR (by TD CO): 7.4    Angiogram 6/18/20   3 vessel CAD s/p 3V CABG in 2008 (LIMA-LAD, SVG-OM1, SVG-RPDA)  2. Known proximal SVG-RPDA occlusion  3. Presumed occlusion of SVG-OM1 (unable to locate on non-selective aortic root shot)  4. Patent LIMA-LAD with collateralization of LAD to PDA    Echocardiogram ( 3/11/2019)   Moderate to severe left ventricular dilation is present.  Severely (EF 10-20%) reduced left ventricular function is present.  No significant valve dysfunction.  Compared to study done 6/5/17 there is no significant change in LV size and  systolic function    Echocardiogram ( 7/5/2020)   Interpretation Summary  Severely (EF 10-20%) reduced left ventricular function is present. LVEF 15%  based on biplane 2D tracing. Severe left ventricular dilation is present.  LVIDd:6.8 cm. Grade III or advanced diastolic dysfunction.  The right ventricle is normal size.  Global right ventricular function is moderately reduced.  No significant valvular abnormalities were noted.  IVC diameter and respiratory changes fall into an intermediate range  suggesting an RA pressure of 8 mmHg.  Mild pulmonary hypertension is present.     This  study was compared with the study from 3/25/2019. RV function has  worsened, LV size and function appear similar.  Devices  ICD ([employer]- 4/2/2015 )     I have reviewed today's vital signs, notes, medications, labs and imaging.  I have also seen and examined the patient and agree with the findings and plan as outlined above.  Pt with spouse at bedside. VSS with lungs clear and tachy S1 and S2 with RHC/bx today revealing nl filling pressures and nl PCW.  TTE with EF 65% and begun on tac with trough level tomorrow.     James Terrazas MD, PhD  Professor, Heart Failure and Cardiac Transplantation  [facility]

## 2020-07-27 NOTE — PLAN OF CARE
Pt POD #7 s/p heart transplant. All CTs pulled yesterday. Continues on an angiomax drip now at 0.072mg/kg/hr. PTT therapeutic. Denies pain. Good I&O. A-paced SR 85-95. Continues to have a temporary pacer set at 90. Pt had 2 runs of afib. Pt NPO after MN for heart cath and biopsy.   Plan: heart cath and biopsy today. Continue to monitor.

## 2020-07-27 NOTE — PLAN OF CARE
D: S/p heart transplant 07/20    I/A: Remains slightly hypertensive, although not meeting PRN hydralazine parameters. SR/ST on tele. TPM at AAI - 90, rarely paced. Slovak as first language,  phone at bedside. SBA/walker. Using IS independently. BG covered with sliding scale insulin/CHO coverage. Echo at bedside. RHC/bx today, R groin WNL, CMS+. Angiomax gtt per orders. PRN robaxin for back soreness/discomfort    P: Diabetes educator request in. Continue with plan of care

## 2020-07-27 NOTE — PLAN OF CARE
Per 6C - unable to get lab draw and need US guidance. Awaiting US availability for 1800 PTT lab draw.

## 2020-07-27 NOTE — PLAN OF CARE
Cancel, pt up to bathroom with bed alarm sounding - notified RN.  Pt c/o sore back, getting pain meds now & need time to take effect, then scheduled for 9:30 R heart cath

## 2020-07-27 NOTE — PLAN OF CARE
Pt POD #6 s/p heart transplant. All CTs pulled today, CXR showed no pneumothorax. Continues on an angiomax drip now at 0.072mg/kg/hr. Due for redraw of PTT at 1800 but unable to get blood until now, awaiting results. Denies pain. Good I&O. A-paced SR 85-95. Continues to have a temporary pacer set at 90. Elevated blood sugars covered with sliding scale insulin. Pt NPO after MN for heart cath and biopsy tomorrow.

## 2020-07-27 NOTE — PROGRESS NOTES
CLINICAL NUTRITION SERVICES - REASSESSMENT NOTE     Nutrition Prescription    Recommendations:  Start calcium + vitamin D supplementation (eg caltrate BID) prior to discharge     Malnutrition Status:    Patient does not meet criteria for malnutrition        EVALUATION OF THE PROGRESS TOWARD GOALS   Diet: Regular + 2L fluid restriction   Intake: Per RN, patient is eating 100% of 3 meals/day. Based on review of room service orders, estimate pt is consuming 100% of calorie and protein needs. Patient currently in the cath lab.         NEW FINDINGS   S/p heart transplant (7/19).      Updated Nutrition Needs:   Dosing weight: 63 kg (adjusted)     Energy needs: 1900 - 2200 kcal/day (30 - 35 kcal/kg/day)    Justification: Increased needs post transplant    Protein needs: 80 - 95 g protein/day (1.3 - 1.5 g/kg/day)    Justification: Increased needs post transplant     MALNUTRITION  % Intake: No decreased intake noted  % Weight Loss: Weight loss prior to admission does not meet criteria (intentional), no further loss noted since admit.  Subcutaneous Fat Loss: None observed  Muscle Loss: None observed  Fluid Accumulation/Edema: Does not meet criteria  Malnutrition Diagnosis: Patient does not meet two of the established criteria necessary for diagnosing malnutrition    Previous Goals   Patient to consume % of nutritionally adequate meal trays TID, or the equivalent with supplements/snacks.  Evaluation: Met    Previous Nutrition Diagnosis  Predicted inadequate nutrient intake (kcal, protein)  Evaluation: No change    CURRENT NUTRITION DIAGNOSIS  Predicted inadequate nutrient intake (kcal, protein) related to potential for reduced intake pending LOS, medical course.      INTERVENTIONS  Implementation  None additionally at this time.    Goals  Patient to consume % of nutritionally adequate meal trays TID, or the equivalent with supplements/snacks.    Monitoring/Evaluation  Progress toward goals will be monitored and  evaluated per protocol.    Nadiya Stinson RD, LD  [id number]  Pgr: [phone]

## 2020-07-27 NOTE — PROGRESS NOTES
Diabetes Consult Daily  Progress Note          Assessment/Plan:     Lucian Henderson Sr. is a 64 year old male with a PMH of end-stage ischemic cardiomyopathy, CAD s/p CABG 2008, DMT2, who was admitted to [facility] 07/03 for heart failure exacerbation with cardiogenic shock, underwent right subclavian IABP insertion 07/16 with Dr. Sparrow, and then heart transplant 07/20 with Dr. Griselli. 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      Hyperglycemic to 300s post-prandially, eating more carbs than usual, on prednisone.    Plan    - NPH 15 units this morning d/t NPO, then extra 12 units at noon since diet resumed  - this evening NPH adjust to 27 units give at HS, accounting for prednisone taper  -tomorrow AM, will order increased NPH dose  - stop fixed meal dose aspart and start insulin to carb ratio: 1 unit per 5 grams carbohydrate with meals and PRN snacks  - aspart correction change to 2 per 30 mg/dL  - nutrition consult and diab educ consult in prep for discharge Wednesday       Outpatient diabetes follow up: Recommend video visit 2-7 days p discharge.  Msg p clin coord HIGH urgency to schedule  Plan discussed with patient, wife, bedside RN, and dietician           Interval History:     The last 24 hours progress and nursing notes reviewed.      Carb consumption based on room service meals yesterday;  B- 90g-- 12 units aspart = 1 per 9 g  L- 104g-- 10 units aspart = 1 per 10 grams  S- 74g-- 10 units aspart = 1 per 7 grams    Only BG reasonably controlled post-prandially was the HS- 190     Eating full meals-- yes  Snacks-- no    He and wife questions about insulin and needing carb coverage at home, how will they do it.--> at home, eats much lower carb.  Wife references 1 piece bread in AM, soups later meals  At home BGs were maintained in good range  Notably his Januvia is only 50 mg, but curren eGFR would support using 100 mg.    Perhaps increasing oral  "agents could supplant need for carb counted aspart at home.       NPO after MN in anticipation of cardiac cath today-- completed before noon and diet restarted.    Recent Labs   Lab 07/27/20  0606 07/27/20  0314 07/26/20  2126 07/26/20  1727 07/26/20  1500 07/26/20  1320 07/26/20  1005 07/26/20  0545  07/25/20  0541  07/24/20  1631  07/24/20  1241  07/24/20  0411   *  --   --   --   --   --   --  166*  --  245*  --  250*  --  108*  --  92   BGM  --  152* 190* 317* 286* 267* 193*  --    < >  --    < >  --    < >  --    < >  --     < > = values in this interval not displayed.           Nutrition:     Orders Placed This Encounter      NPO for Medical/Clinical Reasons Except for: Meds            PTA Regimen:     Lantus 40 units qpm, 8 qam.  Januvia 50 mg qam  Amaryl 4 mg  Reports no salt, no sugar, fasting BG 85 - 110.     7/26 \"I reached wife and she advised WAS taking 48 units daily Lantus, together with Januvia and Amaryl PTA.  He had tried substituting Bydureon for Januvia but had loose stools, bloating abdominal pain and did not like it.  He stopped and resumed Januvia after perhaps one month.          Review of Systems:   See interval hx          Medications:   Prednisone 30 mg bid--> 25 mg BID starting 7/27 AM         Physical Exam:     Gen: Alert, in NAD,m lying flat after RHC, wife at bedside and supportive  HEENT: NC/AT, Eyes clear, Moist membranes  Resp: Unlabored  Neuro: oriented x3, communicating clearly.   Psych: calm, easily engaged  BP (!) 145/61 (BP Location: Left arm)   Pulse 91   Temp 98.5  F (36.9  C) (Oral)   Resp 18   Ht 1.626 m (5' 4\")   Wt 83 kg (183 lb)   SpO2 99%   BMI 31.41 kg/m               Data:     Lab Results   Component Value Date    A1C 8.2 07/03/2020    A1C 7.9 07/08/2019    A1C 8.5 03/11/2019    A1C 7.6 10/16/2018    A1C 9.8 09/06/2017            Recent Labs   Lab Test 07/27/20  0606 07/26/20  0545    140   POTASSIUM 4.6 4.7   CHLORIDE 109 109   CO2 25 26 "   ANIONGAP 6 5   * 166*   BUN 32* 37*   CR 1.31* 1.33*   BRYANNA 7.8* 8.0*     GFR Estimate   Date Value Ref Range Status   07/27/2020 56 (L) >60 mL/min/[1.73_m2] Final     Comment:     Non  GFR Calc  Starting 12/18/2018, serum creatinine based estimated GFR (eGFR) will be   calculated using the Chronic Kidney Disease Epidemiology Collaboration   (CKD-EPI) equation.     07/26/2020 55 (L) >60 mL/min/[1.73_m2] Final     Comment:     Non  GFR Calc  Starting 12/18/2018, serum creatinine based estimated GFR (eGFR) will be   calculated using the Chronic Kidney Disease Epidemiology Collaboration   (CKD-EPI) equation.     07/25/2020 52 (L) >60 mL/min/[1.73_m2] Final     Comment:     Non  GFR Calc  Starting 12/18/2018, serum creatinine based estimated GFR (eGFR) will be   calculated using the Chronic Kidney Disease Epidemiology Collaboration   (CKD-EPI) equation.       GFR Estimate If Black   Date Value Ref Range Status   07/27/2020 65 >60 mL/min/[1.73_m2] Final     Comment:      GFR Calc  Starting 12/18/2018, serum creatinine based estimated GFR (eGFR) will be   calculated using the Chronic Kidney Disease Epidemiology Collaboration   (CKD-EPI) equation.     07/26/2020 64 >60 mL/min/[1.73_m2] Final     Comment:      GFR Calc  Starting 12/18/2018, serum creatinine based estimated GFR (eGFR) will be   calculated using the Chronic Kidney Disease Epidemiology Collaboration   (CKD-EPI) equation.     07/25/2020 60 (L) >60 mL/min/[1.73_m2] Final     Comment:      GFR Calc  Starting 12/18/2018, serum creatinine based estimated GFR (eGFR) will be   calculated using the Chronic Kidney Disease Epidemiology Collaboration   (CKD-EPI) equation.         CBC RESULTS:   Recent Labs   Lab Test 07/27/20  0606   WBC 9.1   RBC 2.52*   HGB 7.5*   HCT 24.7*   MCV 98   MCH 29.8   MCHC 30.4*   RDW 16.6*      I spent a total of 35 minutes bedside  and on the inpatient unit managing the glycemic care of Lucian Henderson Sr.. Over 50% of my time on the unit was spent counseling the patient and wife and/or coordinating care regarding acute diabetes mgmnt, discharge planning.  See note for details.    Liyah Damon APRN [facility] [phone]  Diabetes Management Team job code: [phone]

## 2020-07-27 NOTE — PLAN OF CARE
Discharge Planner OT   Patient plan for discharge: home  Current status: Pt instructed in adaptive UB and LB dressing with AE, able to don shirt and pants with no AE, requiring sock aide for socks, limited by low back pain per pt. Ambulated 2 x 200 ft with CGA and FWW, again limited by back pain, but VSS.   Barriers to return to prior living situation: Decreased ADL independence and activity tolerance  Recommendations for discharge: home with assist and OP CR  Rationale for recommendations: Increase functional endurance and ADL independence with OP CR       Entered by: Wali Everett 07/27/2020 4:14 PM

## 2020-07-27 NOTE — PROGRESS NOTES
Vascular Access Services Notes:    Left arm PIV lab draw done without complication. Blood sample was given to the .      CURRY BakerN, RN [facility]

## 2020-07-27 NOTE — PLAN OF CARE
VSS on RA. Reports back pain. Robaxin x1 and heating pad available. PIV infiltrated, replaced with assistance of vascular access. Phosphorus replaced. Angiomax infusing and increased d/t still subtherapeutic. Elevated BG monitoring and insulin given per orders. Chest tubes removed today. CVTS primary, NPO at midnight for RHC, biopsy, will continue POC.

## 2020-07-28 ENCOUNTER — APPOINTMENT (OUTPATIENT)
Dept: GENERAL RADIOLOGY | Facility: CLINIC | Age: 65
DRG: 001 | End: 2020-07-28
Attending: PHYSICIAN ASSISTANT
Payer: COMMERCIAL

## 2020-07-28 ENCOUNTER — TELEPHONE (OUTPATIENT)
Dept: TRANSPLANT | Facility: CLINIC | Age: 65
End: 2020-07-28

## 2020-07-28 ENCOUNTER — APPOINTMENT (OUTPATIENT)
Dept: PHYSICAL THERAPY | Facility: CLINIC | Age: 65
DRG: 001 | End: 2020-07-28
Attending: INTERNAL MEDICINE
Payer: COMMERCIAL

## 2020-07-28 ENCOUNTER — APPOINTMENT (OUTPATIENT)
Dept: INTERPRETER SERVICES | Facility: CLINIC | Age: 65
End: 2020-07-28
Payer: COMMERCIAL

## 2020-07-28 ENCOUNTER — APPOINTMENT (OUTPATIENT)
Dept: OCCUPATIONAL THERAPY | Facility: CLINIC | Age: 65
DRG: 001 | End: 2020-07-28
Attending: INTERNAL MEDICINE
Payer: COMMERCIAL

## 2020-07-28 LAB
ANION GAP SERPL CALCULATED.3IONS-SCNC: 3 MMOL/L (ref 3–14)
APTT PPP: 47 SEC (ref 22–37)
BUN SERPL-MCNC: 31 MG/DL (ref 7–30)
CALCIUM SERPL-MCNC: 7.8 MG/DL (ref 8.5–10.1)
CHLORIDE SERPL-SCNC: 110 MMOL/L (ref 94–109)
CO2 SERPL-SCNC: 24 MMOL/L (ref 20–32)
COPATH REPORT: NORMAL
CREAT SERPL-MCNC: 1.21 MG/DL (ref 0.66–1.25)
ERYTHROCYTE [DISTWIDTH] IN BLOOD BY AUTOMATED COUNT: 17.1 % (ref 10–15)
GFR SERPL CREATININE-BSD FRML MDRD: 62 ML/MIN/{1.73_M2}
GLUCOSE BLDC GLUCOMTR-MCNC: 144 MG/DL (ref 70–99)
GLUCOSE BLDC GLUCOMTR-MCNC: 167 MG/DL (ref 70–99)
GLUCOSE BLDC GLUCOMTR-MCNC: 168 MG/DL (ref 70–99)
GLUCOSE BLDC GLUCOMTR-MCNC: 221 MG/DL (ref 70–99)
GLUCOSE BLDC GLUCOMTR-MCNC: 97 MG/DL (ref 70–99)
GLUCOSE SERPL-MCNC: 142 MG/DL (ref 70–99)
HCT VFR BLD AUTO: 25.7 % (ref 40–53)
HGB BLD-MCNC: 7.9 G/DL (ref 13.3–17.7)
MCH RBC QN AUTO: 30.3 PG (ref 26.5–33)
MCHC RBC AUTO-ENTMCNC: 30.7 G/DL (ref 31.5–36.5)
MCV RBC AUTO: 99 FL (ref 78–100)
PLATELET # BLD AUTO: 308 10E9/L (ref 150–450)
POTASSIUM SERPL-SCNC: 4.6 MMOL/L (ref 3.4–5.3)
RBC # BLD AUTO: 2.61 10E12/L (ref 4.4–5.9)
SODIUM SERPL-SCNC: 137 MMOL/L (ref 133–144)
WBC # BLD AUTO: 9.2 10E9/L (ref 4–11)

## 2020-07-28 PROCEDURE — 25000132 ZZH RX MED GY IP 250 OP 250 PS 637: Performed by: CLINICAL NURSE SPECIALIST

## 2020-07-28 PROCEDURE — 97530 THERAPEUTIC ACTIVITIES: CPT | Mod: GP | Performed by: REHABILITATION PRACTITIONER

## 2020-07-28 PROCEDURE — 40000986 XR CHEST PORT 1 VW

## 2020-07-28 PROCEDURE — 36592 COLLECT BLOOD FROM PICC: CPT | Performed by: THORACIC SURGERY (CARDIOTHORACIC VASCULAR SURGERY)

## 2020-07-28 PROCEDURE — 25000132 ZZH RX MED GY IP 250 OP 250 PS 637: Performed by: INTERNAL MEDICINE

## 2020-07-28 PROCEDURE — 25000132 ZZH RX MED GY IP 250 OP 250 PS 637: Performed by: STUDENT IN AN ORGANIZED HEALTH CARE EDUCATION/TRAINING PROGRAM

## 2020-07-28 PROCEDURE — 25000131 ZZH RX MED GY IP 250 OP 636 PS 637: Performed by: STUDENT IN AN ORGANIZED HEALTH CARE EDUCATION/TRAINING PROGRAM

## 2020-07-28 PROCEDURE — 99232 SBSQ HOSP IP/OBS MODERATE 35: CPT | Mod: GC | Performed by: INTERNAL MEDICINE

## 2020-07-28 PROCEDURE — 25000132 ZZH RX MED GY IP 250 OP 250 PS 637: Performed by: PEDIATRICS

## 2020-07-28 PROCEDURE — 85027 COMPLETE CBC AUTOMATED: CPT | Performed by: THORACIC SURGERY (CARDIOTHORACIC VASCULAR SURGERY)

## 2020-07-28 PROCEDURE — 00000146 ZZHCL STATISTIC GLUCOSE BY METER IP

## 2020-07-28 PROCEDURE — 25800030 ZZH RX IP 258 OP 636: Performed by: PEDIATRICS

## 2020-07-28 PROCEDURE — 85730 THROMBOPLASTIN TIME PARTIAL: CPT | Performed by: THORACIC SURGERY (CARDIOTHORACIC VASCULAR SURGERY)

## 2020-07-28 PROCEDURE — 27211389 ZZ H KIT, 5 FR DL BIOFLO OPEN ENDED PICC

## 2020-07-28 PROCEDURE — 36569 INSJ PICC 5 YR+ W/O IMAGING: CPT

## 2020-07-28 PROCEDURE — 21400000 ZZH R&B CCU UMMC

## 2020-07-28 PROCEDURE — 25000128 H RX IP 250 OP 636: Performed by: PEDIATRICS

## 2020-07-28 PROCEDURE — 25000131 ZZH RX MED GY IP 250 OP 636 PS 637: Performed by: INTERNAL MEDICINE

## 2020-07-28 PROCEDURE — 25000125 ZZHC RX 250: Performed by: PHYSICIAN ASSISTANT

## 2020-07-28 PROCEDURE — 27211417 ZZ H KIT, VPS RHYTHM STYLET

## 2020-07-28 PROCEDURE — 25000132 ZZH RX MED GY IP 250 OP 250 PS 637: Performed by: PHYSICIAN ASSISTANT

## 2020-07-28 PROCEDURE — 80048 BASIC METABOLIC PNL TOTAL CA: CPT | Performed by: THORACIC SURGERY (CARDIOTHORACIC VASCULAR SURGERY)

## 2020-07-28 PROCEDURE — 97110 THERAPEUTIC EXERCISES: CPT | Mod: GO

## 2020-07-28 PROCEDURE — 25000132 ZZH RX MED GY IP 250 OP 250 PS 637: Performed by: SURGERY

## 2020-07-28 RX ORDER — LIDOCAINE 40 MG/G
CREAM TOPICAL
Status: ACTIVE | OUTPATIENT
Start: 2020-07-28 | End: 2020-07-31

## 2020-07-28 RX ORDER — FUROSEMIDE 20 MG
20 TABLET ORAL DAILY
Status: DISCONTINUED | OUTPATIENT
Start: 2020-07-28 | End: 2020-07-29

## 2020-07-28 RX ORDER — ROSUVASTATIN CALCIUM 5 MG/1
5 TABLET, COATED ORAL DAILY
Status: DISCONTINUED | OUTPATIENT
Start: 2020-07-28 | End: 2020-07-30

## 2020-07-28 RX ORDER — TACROLIMUS 1 MG/1
1 CAPSULE ORAL
Status: DISCONTINUED | OUTPATIENT
Start: 2020-07-28 | End: 2020-07-29

## 2020-07-28 RX ADMIN — NYSTATIN 1000000 UNITS: 100000 SUSPENSION ORAL at 20:26

## 2020-07-28 RX ADMIN — LIDOCAINE HYDROCHLORIDE 1 ML: 10 INJECTION, SOLUTION EPIDURAL; INFILTRATION; INTRACAUDAL; PERINEURAL at 11:12

## 2020-07-28 RX ADMIN — TACROLIMUS 0.5 MG: 0.5 CAPSULE, GELATIN COATED ORAL at 08:17

## 2020-07-28 RX ADMIN — TERBUTALINE SULFATE 10 MG: 5 TABLET ORAL at 17:51

## 2020-07-28 RX ADMIN — NYSTATIN 1000000 UNITS: 100000 SUSPENSION ORAL at 08:16

## 2020-07-28 RX ADMIN — INSULIN ASPART 2 UNITS: 100 INJECTION, SOLUTION INTRAVENOUS; SUBCUTANEOUS at 12:44

## 2020-07-28 RX ADMIN — GLIMEPIRIDE 6 MG: 4 TABLET ORAL at 09:05

## 2020-07-28 RX ADMIN — PREDNISONE 25 MG: 5 TABLET ORAL at 08:17

## 2020-07-28 RX ADMIN — Medication 12.5 MG: at 22:30

## 2020-07-28 RX ADMIN — TACROLIMUS 1 MG: 1 CAPSULE ORAL at 17:51

## 2020-07-28 RX ADMIN — BIVALIRUDIN 0.08 MG/KG/HR: 250 INJECTION, POWDER, LYOPHILIZED, FOR SOLUTION INTRAVENOUS at 17:50

## 2020-07-28 RX ADMIN — MYCOPHENOLATE MOFETIL 1500 MG: 250 CAPSULE ORAL at 08:16

## 2020-07-28 RX ADMIN — TERBUTALINE SULFATE 10 MG: 5 TABLET ORAL at 06:46

## 2020-07-28 RX ADMIN — NYSTATIN 1000000 UNITS: 100000 SUSPENSION ORAL at 17:09

## 2020-07-28 RX ADMIN — TERBUTALINE SULFATE 10 MG: 5 TABLET ORAL at 23:55

## 2020-07-28 RX ADMIN — ACETAMINOPHEN 975 MG: 325 TABLET, FILM COATED ORAL at 04:22

## 2020-07-28 RX ADMIN — PANTOPRAZOLE SODIUM 40 MG: 40 TABLET, DELAYED RELEASE ORAL at 08:17

## 2020-07-28 RX ADMIN — Medication 12.5 MG: at 09:07

## 2020-07-28 RX ADMIN — SULFAMETHOXAZOLE AND TRIMETHOPRIM 1 TABLET: 400; 80 TABLET ORAL at 08:17

## 2020-07-28 RX ADMIN — ROSUVASTATIN CALCIUM 5 MG: 5 TABLET, FILM COATED ORAL at 17:51

## 2020-07-28 RX ADMIN — MYCOPHENOLATE MOFETIL 1500 MG: 250 CAPSULE ORAL at 17:51

## 2020-07-28 RX ADMIN — METHOCARBAMOL 500 MG: 500 TABLET, FILM COATED ORAL at 08:25

## 2020-07-28 RX ADMIN — ASPIRIN 81 MG CHEWABLE TABLET 81 MG: 81 TABLET CHEWABLE at 08:17

## 2020-07-28 RX ADMIN — INSULIN ASPART 2 UNITS: 100 INJECTION, SOLUTION INTRAVENOUS; SUBCUTANEOUS at 17:54

## 2020-07-28 RX ADMIN — SITAGLIPTIN 100 MG: 100 TABLET, FILM COATED ORAL at 09:05

## 2020-07-28 RX ADMIN — Medication 12.5 MG: at 14:50

## 2020-07-28 RX ADMIN — PREDNISONE 25 MG: 5 TABLET ORAL at 20:26

## 2020-07-28 RX ADMIN — FUROSEMIDE 20 MG: 20 TABLET ORAL at 17:09

## 2020-07-28 RX ADMIN — DOCUSATE SODIUM AND SENNOSIDES 1 TABLET: 8.6; 5 TABLET ORAL at 08:25

## 2020-07-28 RX ADMIN — VALGANCICLOVIR 450 MG: 450 TABLET, FILM COATED ORAL at 08:17

## 2020-07-28 RX ADMIN — NYSTATIN 1000000 UNITS: 100000 SUSPENSION ORAL at 12:17

## 2020-07-28 RX ADMIN — TERBUTALINE SULFATE 10 MG: 5 TABLET ORAL at 12:17

## 2020-07-28 RX ADMIN — TAMSULOSIN HYDROCHLORIDE 0.4 MG: 0.4 CAPSULE ORAL at 08:17

## 2020-07-28 ASSESSMENT — ACTIVITIES OF DAILY LIVING (ADL)
ADLS_ACUITY_SCORE: 12

## 2020-07-28 ASSESSMENT — PAIN DESCRIPTION - DESCRIPTORS: DESCRIPTORS: ACHING;SORE

## 2020-07-28 ASSESSMENT — MIFFLIN-ST. JEOR: SCORE: 1515.19

## 2020-07-28 NOTE — PLAN OF CARE
OT 6C  Discharge Planner OT   Patient plan for discharge: Home with family assist  Current status: CGA sit<>stand with FWW. CGA ambulates ~100ft with FWW. Rated low back pain 8/10 declining further therapy. Provided with folder education and energy conservation techniques. VSS on RA  Barriers to return to prior living situation: medical status, deconditioning  Recommendations for discharge: Per plan established by the OT, the recommendation for dc location is home with A  Rationale for recommendations: Pt may benefit from skilled therapy to increase independence with ADLs and increase activity tolerance for safe return home with assist.       Entered by: Chauncey Rodriguez 07/28/2020 9:00 AM

## 2020-07-28 NOTE — PROVIDER NOTIFICATION
07/28/20 1107   PICC Double Lumen 07/28/20 Left Basilic   Placement Date/Time: 07/28/20 (c) 1107   Catheter Brand: (c) other (see comment)  Size (Fr): 5 Fr  Lot #: [id number]  Full barrier precautions done: Yes, hand hygiene, sterile gown, sterile gloves, mask, cap, full body drape, chlorhexidine scrub  Consent...   Site Assessment WDL   External Cath Length (cm) 1 cm   Extremity Circumference (cm) 30 cm   Dressing Intervention Chlorhexidine patch;Transparent;Securing device;New dressing   Dressing Change Due 08/04/20   PICC Comment PICC inserted   Lumen A - Color GRAY   Lumen A - Status blood return noted;saline locked   Lumen A - Cap Change Due 08/01/20   Lumen B - Color PURPLE   Lumen B - Status blood return noted;saline locked   Lumen B - Cap Change Due 08/01/20   Extravasation? No   Line Necessity Yes, meets criteria

## 2020-07-28 NOTE — PROGRESS NOTES
[facility]   Cardiology II Service / Advanced Heart Failure  Progress note    Lucian Morillo  : 1955  MRN # [id number]    ADMIT DATE: 7/3/2020    Changes today  - increase tacro to 1 mg PO bid and check trough tomorrow, patient can be discharged after tacrolimus level is detectable  - would recommend starting low dose lasix   - hydralazine for afterload reduction   - Bivalirudin for HIT, will transition to fondaparinux tomorrow after PM is pulled (7.5 mg sub q )     ASSESSMENT & PLAN:  Lucian Henderson Sr. is a 64 year old male with a PMH of HTN, DMII, obesity, CKD, CHANTEL, ischemic cardiomyopathy and severe LV systolic dysfunction s/p 3V CABG () and primary prevention ICD ( 4/2/15). Currently he presents with 3 days of worsening fatigue and SOB with minimal exertion. Patient claims he could walk 1-2 blocks last week but has been barely able to ambulate home recently.     Admitted for possible LVAD/Transplant work up. IABP and listed status 2 for heart transplant . HIT + , underwent PLEX, s/p . Post operative course complicated by graft dysfunction and VT. Graft function back to near normal , LVEF 50-55%.  Normal on      # ICM s/p 3V CABG () and primary prevention ICD ( 4/2/15)  # HFrEF Stage D, NYHA III-IV s/p OHT   # Ambulatory cardiogenic shock  # Arrhythmia: HF associated VT/VF, VT (, post op from OHT)   # Graft dysfunction (EF 20-25%)   Graft Function: TTE POD#1 w/ LVEF 20-25%, RV severely reduced, suspect 2/2 to ischemic time, minimal change in LV fxn , graft function recovered   --Volume: euvolemia, lasix 20 mg PO today   --BP: MAP goal >65 , hydralazine started today   --HR: Goal >100,   -- continue with terbutaline 10 mg PO Q6H   CAV prophylaxis: ASA, will start pravastatin in coming days     Pressors/Inotropes:  -none     Immunosuppression:  -s/p Simulect on POD#0, second dose POD#4 ()  -MMF 1500 mg BID  -s/p  "solumedrol 125 mg q8h  --> transitioned to prednisone  w/ taper   - Tacro started on 7/26: 0.5 mg PO bid increased to 1 mg PO bid today   -routine endomyocardial bx 7 days post transplant (7/27) - RA 9 PA 25 PCW 15 CO 6.2      Antibiotics:   - vancomycin (will get 3 days)  - cefepime -> ceftriaxone 7/21  - Nystatin Swish&Swallow  -  bactrim started single strength daily   - CMV: R+ / D+, start valcyte 450 mg qday 7/24  - EBV: R+ / D+  - f/u blood cx, NGTD   - donor blood cx growing strep salivarius in 1/2 bottles  -->transplant ID consult 7/21, rec narrow to ceftriaxone     # Atrial Flutter with RVR  Patient went into Atrial flutter with RVR with rates around 150 overnight on 7/7/2020 at around 2 am. Converted to NSR with amiodarone, which was stopped 7/19 given OHT.     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 Arvin Ford     ..........................................................................................................................................................  Subjective:  SARITA overnight. No arrhythmias. Extubated. Denies any SOB, n/v or abdominal pain.     PHYSICAL EXAM:  BP (!) 141/61 (BP Location: Left arm)   Pulse 91   Temp 98.5  F (36.9  C) (Oral)   Resp 16   Ht 1.626 m (5' 4\")   Wt 82.4 kg (181 lb 11.2 oz)   SpO2 96%   BMI 31.19 kg/m    GENERAL: NAD  NECK: Supple and without lymphadenopathy. JVP unable to assess   CV: S1/S2 heard without murmur   RESPIRATORY: CTAB  GI: Soft and distended. No tenderness, rebound, guarding. No palpable organomegaly.   EXTREMITIES: Peripheral edema trace.   NEUROLOGIC: Follows commands, moves all four extremities   MUSCULOSKELETAL: No joint swelling or tenderness.   SKIN: No jaundice. No acute rashes or lesions.     ROS:   12-pt ROS otherwise negative except as noted " above    PMH:  Past Medical History:   Diagnosis Date     CAD (coronary artery disease)      CHF (congestive heart failure) (H)      CKD (chronic kidney disease), stage III (H)      Cortical cataract of both eyes      Diabetes (H)      Hyperlipidemia      Hypertension      Ischemic cardiomyopathy      Obesity      CHANTEL (obstructive sleep apnea)     occas cpap     Osteoarthritis        PSH:  Past Surgical History:   Procedure Laterality Date     C CABG, ARTERY-VEIN, THREE  02/2008     CATARACT IOL, RT/LT       COLONOSCOPY N/A 8/7/2019    Procedure: COLONOSCOPY, WITH POLYPECTOMY AND BIOPSY;  Surgeon: Chauncey Morataya MD;  Location:  GI     CV ANGIOGRAM CORONARY GRAFT N/A 7/2/2020    Procedure: Angiogram Coronary Graft;  Surgeon: Alex Lantigua MD;  Location:  HEART CARDIAC CATH LAB     CV CORONARY ANGIOGRAM N/A 7/2/2020    Procedure: CV CORONARY ANGIOGRAM;  Surgeon: Alex Lantigua MD;  Location:  HEART CARDIAC CATH LAB     CV HEART BIOPSY N/A 7/27/2020    Procedure: Heart Biopsy;  Surgeon: Mario Burr MD;  Location:  HEART CARDIAC CATH LAB     CV INTRA-AORTIC BALLOON PUMP INSERTION N/A 7/14/2020    Procedure: RHC WITH LEAVE IN SWAN/IABP;  Surgeon: Sonny Saleh MD;  Location: [facility] HE[facility] CARDIAC CATH LAB     CV RIGHT HEART CATH N/A 3/25/2019    Procedure: CV RIGHT HEART CATH;  Surgeon: Moises Santos MD;  Location:  HEART CARDIAC CATH LAB     CV RIGHT HEART CATH N/A 7/10/2019    Procedure: CV RIGHT HEART CATH;  Surgeon: Jak Mccabe MD;  Location: [facility] HE[facility] CARDIAC CATH LAB     CV RIGHT HEART CATH N/A 7/8/2020    Procedure: Right Heart Cath with Leave In swan already has ICU load;  Surgeon: Jak Mccabe MD;  Location: [facility] HEART CARDIAC CATH LAB     CV RIGHT HEART CATH N/A 7/14/2020    Procedure: CV RIGHT HEART CATH;  Surgeon: Sonny Saleh MD;  Location:  HEART CARDIAC CATH LAB     CV RIGHT HEART CATH N/A 7/2/2020    Procedure: CV RIGHT HEART CATH;  Surgeon:  Alex Lantigua MD;  Location:  HEART CARDIAC CATH LAB     CV RIGHT HEART CATH N/A 7/27/2020    Procedure: Right Heart Cath;  Surgeon: Mario Burr MD;  Location:  HEART CARDIAC CATH LAB     EXTRACTION(S) DENTAL Left 7/13/2020    Procedure: EXTRACTION, TOOTH #11, 12, 13, 15, and 29;  Surgeon: Monica Chao DDS;  Location:  OR     IMPLANT AUTOMATIC IMPLANTABLE CARDIOVERTER DEFIBRILLATOR       INSERT INTRAAORTIC BALLOON PUMP N/A 7/16/2020    Procedure: Subclavian Intra-Aortic Balloon Pump Placement;  Surgeon: Allan Sparrow MD;  Location: [facility] OR     PHACOEMULSIFICATION CLEAR CORNEA WITH STANDARD IOL, VITRECTOMY PARSPLANA 25 GAGUE, COMBINED Left 12/10/2019    Procedure: PHACOEMULSIFICATION, CATARACT, CLEAR CORNEAL INCISION APPROACH, W STD INTRAOCULAR LENS IMPLANT INSERT + VITRECTOMY BY PARS PLANA  USING 25-GAUGE INSTRUMENTS. ENDOLASER, INFUSION OF 20% SF6 GAS;  Surgeon: Triny Bunch MD;  Location: [facility] OR     PICC INSERTION Right 07/11/2020    basilic 44 cm total      TRANSPLANT HEART RECIPIENT N/A 7/19/2020    Procedure: REDO MEDIAN STERNOTOMY, TRANSPLANT, ORTHOTOPIC HEART, RECIPIENT, ON PUMP OXYGENATOR, REMOVAL OF CARDIAC DEFIBRILLATOR AND LEAD;  Surgeon: Griselli, Massimo, MD;  Location:  OR       MEDICATIONS:  Prior to Admission Medications   Prescriptions Last Dose Informant Patient Reported? Taking?   aspirin 81 MG tablet  Self No No   Sig: Take 1 tablet (81 mg) by mouth daily   atorvastatin (LIPITOR) 40 MG tablet  Self No No   Sig: Take 1 tablet (40 mg) by mouth daily   blood glucose (ACCU-CHEK SMARTVIEW) test strip  Self No No   Sig: USE TO TEST THREE TIMES DAILY AS DIRECTED   blood glucose (NO BRAND SPECIFIED) test strip  Self No No   Sig: Use to test blood sugar 2 times daily or as directed.   blood glucose monitoring (ACCU-CHEK FELIPE SMARTVIEW) meter device kit  Self No No   Sig: Use to test blood sugar 3-4 times daily, as directed.   blood glucose monitoring (ONE TOUCH  DELICA) lancets  Self No No   Sig: Use to test blood sugars 2 times daily.   clopidogrel (PLAVIX) 75 MG tablet  Self No No   Sig: Take 1 tablet (75 mg) by mouth daily   exenatide ER (BYDUREON) 2 MG pen  Self No No   Sig: Inject 2 mg Subcutaneous every 7 days   furosemide (LASIX) 20 MG tablet  Self No No   Sig: Take 2 tablets (40 mg) by mouth 2 times daily   glimepiride (AMARYL) 4 MG tablet  Self No No   Sig: Take 1 tablet (4 mg) by mouth every morning (before breakfast)   hydrALAZINE (APRESOLINE) 25 MG tablet  Self No No   Sig: Take 1 tablet (25 mg) by mouth 3 times daily   insulin glargine (LANTUS SOLOSTAR) 100 UNIT/ML pen  Self No No   Sig: Take 8 units in the morning and 40 units in the evening   insulin pen needle (B-D U/F) 31G X 5 MM miscellaneous  Self No No   Si Units by Device route 2 times daily Use 2 pen needles daily or as directed.   isosorbide mononitrate (IMDUR) 60 MG 24 hr tablet  Self No No   Sig: Take 1 tablet (60 mg) by mouth daily   losartan (COZAAR) 50 MG tablet  Spouse/Significant Other No No   Sig: Take 1 tablet (50 mg) by mouth daily   nitroglycerin (NITROSTAT) 0.4 MG SL tablet  Self No No   Sig: Place 1 tablet (0.4 mg) under the tongue every 5 minutes as needed for chest pain If you are still having symptoms after 3 doses (15 minutes) call 911.   order for DME  Self No No   Sig: Auto CPAP 8-15 cmH20      Facility-Administered Medications Last Administration Doses Remaining   erythromycin (ROMYCIN) ophthalmic ointment None recorded    lidocaine (PF) (XYLOCAINE) 2 % injection 10 mL None recorded 1           ALLERGIES:     Allergies   Allergen Reactions     Heparin Heparin Induced Thrombocytopenia     HIT screen sent 2020. CORRINE confirmed positive     No Known Drug Allergies        FAMILY HISTORY:  Family History   Problem Relation Age of Onset     Diabetes Brother      Diabetes Sister      Diabetes Sister      Macular Degeneration No family hx of      Glaucoma No family hx of       Myocardial Infarction No family hx of      Kidney Disease No family hx of        SOCIAL HISTORY:  Social History     Socioeconomic History     Marital status:      Spouse name: Not on file     Number of children: 4     Years of education: Not on file     Highest education level: Not on file   Occupational History     Occupation:      Employer: [employer]     Employer: RETIRED   Social Needs     Financial resource strain: Not on file     Food insecurity     Worry: Not on file     Inability: Not on file     Transportation needs     Medical: Not on file     Non-medical: Not on file   Tobacco Use     Smoking status: Never Smoker     Smokeless tobacco: Never Used     Tobacco comment: Never smoked; non-smoking household   Substance and Sexual Activity     Alcohol use: No     Alcohol/week: 0.0 standard drinks     Drug use: No     Sexual activity: Yes     Partners: Female   Lifestyle     Physical activity     Days per week: Not on file     Minutes per session: Not on file     Stress: Not on file   Relationships     Social connections     Talks on phone: Not on file     Gets together: Not on file     Attends Denominational service: Not on file     Active member of club or organization: Not on file     Attends meetings of clubs or organizations: Not on file     Relationship status: Not on file     Intimate partner violence     Fear of current or ex partner: Not on file     Emotionally abused: Not on file     Physically abused: Not on file     Forced sexual activity: Not on file   Other Topics Concern     Parent/sibling w/ CABG, MI or angioplasty before 65F 55M? No   Social History Narrative     Not on file       LABS:  BMP  Recent Labs   Lab 07/27/20  0606 07/26/20  0545 07/25/20  0541 07/24/20  1631 07/24/20  1241  07/23/20  2109  07/23/20  0411    140 138 138 141   < > 137   < > 137   POTASSIUM 4.6 4.7 4.4 4.1 3.6   < > 4.3   < > 4.8   CHLORIDE 109 109 108 105 111*   < > 106   < > 107   CO2 25 26  24 24 24   < > 28   < > 27   BUN 32* 37* 41* 39* 34*   < > 41*   < > 39*   CR 1.31* 1.33* 1.40* 1.53* 1.29*   < > 1.31*   < > 1.32*   * 166* 245* 250* 108*   < > 145*   < > 103*   BRYANNA 7.8* 8.0* 7.7* 7.9* 6.9*   < > 8.1*   < > 8.3*   PHOS  --  2.4*  --   --  2.5  --  2.4*  --  2.7    < > = values in this interval not displayed.     CBC  Recent Labs   Lab 07/27/20  0606 07/26/20  0545 07/25/20  0541 07/24/20  1241   WBC 9.1 9.2 6.6 9.4   HGB 7.5* 8.2* 7.7* 8.4*   HCT 24.7* 25.8* 24.7* 27.1*   MCV 98 96 96 95    216 172 144*   LYMPH  --  1.2  --   --      INR  Recent Labs   Lab 07/27/20  2142 07/27/20  0606 07/26/20  2212 07/26/20  1316   PTT 47* 55* 51* 39*     IMAGING:    RHC 6/18/20     RA 20/26/18  RV 85/28  PA 82/45/58  PCWP 45  Cardiac output by Jacy: 3.85 L/min (indexed to 2.09 L/min/m2)  Cardiac output by thermodilution: 3.63 L/min (indexed to 1.97 L/min/m2)  SVR (by TD CO): 1123  PVR (by TD CO): 7.4    Angiogram 6/18/20   3 vessel CAD s/p 3V CABG in 2008 (LIMA-LAD, SVG-OM1, SVG-RPDA)  2. Known proximal SVG-RPDA occlusion  3. Presumed occlusion of SVG-OM1 (unable to locate on non-selective aortic root shot)  4. Patent LIMA-LAD with collateralization of LAD to PDA    Echocardiogram ( 3/11/2019)   Moderate to severe left ventricular dilation is present.  Severely (EF 10-20%) reduced left ventricular function is present.  No significant valve dysfunction.  Compared to study done 6/5/17 there is no significant change in LV size and  systolic function    Echocardiogram ( 7/5/2020)   Interpretation Summary  Severely (EF 10-20%) reduced left ventricular function is present. LVEF 15%  based on biplane 2D tracing. Severe left ventricular dilation is present.  LVIDd:6.8 cm. Grade III or advanced diastolic dysfunction.  The right ventricle is normal size.  Global right ventricular function is moderately reduced.  No significant valvular abnormalities were noted.  IVC diameter and respiratory changes fall  into an intermediate range  suggesting an RA pressure of 8 mmHg.  Mild pulmonary hypertension is present.     This study was compared with the study from 3/25/2019. RV function has  worsened, LV size and function appear similar.  Devices  ICD (CADsurf- 4/2/2015 )

## 2020-07-28 NOTE — PROGRESS NOTES
Cardiovascular Surgery Progress Note  07/28/2020         Assessment and Plan:     Lucian Henderson Sr. is a 64 year old male with a PMH of end-stage ischemic cardiomyopathy, CAD s/p CABG 2008, DMT2, who was admitted to [facility] 07/03 for heart failure exacerbation with cardiogenic shock, underwent right subclavian IABP insertion 07/16 with Dr. Sparrow, and then heart transplant 07/20 with Dr. Griselli. Hospital course notable for HIT+ 07/19, underwent PLEX prior to transplant    Cardiovascular:   S/p heart transplant  Primary graft dysfunction, improved  TTE 07/20 with EF 20-25%, severe RV dysfunction, suspected due to ischemic time. TTE 07/21 with EF improved to 50-55%. CI robust off pressors/inotrope 07/24. Repeat Echo 7/27 with normal LVEF  - -120s, improved today. Started hydralzine 12.5 mg TID on 7/27, titrate as able  - Appears mildly hypervolemic, received lasix 20 mg IV x1 on 7/26, RHC with normal filling pressures  - Chest tubes: removed 7/26, f/u CXR stable  - TPW: HR NSR 90s-100. TPW at backup rate 90. Terbutaline 10 mg q6H, leave wires today pending first biopsy results per cards 2  Immunosuppression per Cards 2  - Simulect induction  - MMF 1500 mg BID  - Tacrolimus start 07/26  - Prednisone taper per cardiology  Serologies/Prophylaxis per Cards 2  - CMV: R+ / D+  - EBV: R+ / D+  - Valcyte 450 mg daily 7/24  - Bacrtim 07/26  - Nystatin s/s   - Aspirin 81 mg daily   - Consider starting statin (LFTs 7/25 WNL)   Pericardial effusion  Found incidentally on echo 7/27, 1.7 cm. HDS   - Repeat echo on Wed (7/29)    Pulmonary:  Postop mechanical ventilation, resolved  CHANTEL  - Extubated POD 2; Saturating well on RA.   - Supplemental O2 PRN to keep sats > 92%. Wean off as tolerated.  - Pulm toilet, IS, activity and deep breathing  - Resume CPAP at night, patient requests to use hospital CPAP    Neurology / MSK:  Postop pain  - Tylenol scheduled  - Oxycodone PRN  - Dilaudid IV PRN  - Robaxin PRN     /  Renal:  CKD 3  - Cr pending, trend daily  - Avoid nephrotoxins  - Diuresis as above  Urinary retention  - Tamsulosin 0.4 mg daily, voiding without issues    GI / FEN:   Nutrition  - Diabetic diet, continue bowel regimen    Endocrine:  DMT2  Stress/medication induced induced hyperglycemia  - Consult to endocrine for blood sugar management, appreciate assistance    Infectious Disease:  Leukocytosis, resolved  Donor Streptococcus salivarius bacteremia  - WBC WNL, afebrile, no signs or symptoms of infection  - ID consulted. Completed perioperative antibiotics. Completed 7 day course of Ceftriaxone 2g q24H for donor bacteremia (ended 7/25)  - Blood culture 7/21 NG    Hematology:   Acute postop blood loss anemia and thrombocytopenia  - Hgb and plt stable, no signs or symptoms of active bleeding  HIT+  RUE DVT  HIT+ 7/19 S/p PLEX 07/19. US UE 07/22 positve for non-occlusive RIJ DVT, and R cephalic vein occlusive thrombus. On bival infusion  - Plan to transition to fondaparinux or DOAC prior to discharge  - No heparin products  - unable to avoid PICC, difficulty lab draw, unable to obtain labs this am, will place PICC for temporary use over the next few days, avoid RUE given DVT    Prophylaxis:   - Stress ulcer prophylaxis: Pantoprazole 40 mg daily  - DVT prophylaxis: Bival, SCD    Disposition:   - Transferred to  on 07/24  - Rehab recommending likely discharge to home with 24 hour assist, possibly Thurs/Friday pending biopsy results and TPW removal      Discussed with CVTS Fellow as needed.  Staff surgeons have been informed of changes through both verbal and written communication.      Donald Rand PA-C  Cardiothoracic Surgery  p: [phone]          Interval History:   No acute complaints. Pain is well controlled. Reports breathing is okay, denies any shortness of breath. Appetite is okay. +BM today. Denies any fevers, chills, sweats, lightheadedness, dizziness.          Physical Exam:   Blood pressure 121/57, pulse  "91, temperature 99  F (37.2  C), temperature source Oral, resp. rate 16, height 1.626 m (5' 4\"), weight 82.4 kg (181 lb 11.2 oz), SpO2 96 %.  Vitals:    20 0358 20 0400 20 0400   Weight: 82.9 kg (182 lb 12.8 oz) 83 kg (183 lb) 82.4 kg (181 lb 11.2 oz)      Gen: NAD, resting in bed  CV: tachycardic, regular, S1S2 normal, no murmurs, rubs, or gallops.  Pulm: diminished bases, no rhonchi or wheezes  Abd: soft, non-tender, no guarding, +BS  Ext: 1+ bilateral lower extremity edema  Incision: sternal incision clean, dry, intact, no erythema; left ICD pocket clean, dry, intact, no erythema or drainage, right subclavian IABP site incision clean, dry, intact, no eythema or drainage  Chest Tube sites: dressings clean and dry  Neuro: grossly normal  Psych: calm, cooperative            Data:    Imaging:  reviewed recent imaging  Recent Results (from the past 24 hour(s))   Echo Limited    Narrative    [id number]  [id number]  [id number]  [id number]^JEFFY^HAVEN           [facility]  Echocardiography Laboratory  [address] [place], [place] [id number]     Name: BUCK CABAN  MRN: [id number]  : 1955  Study Date: 2020 07:44 AM  Age: 64 yrs  Gender: Male  [place] [facility] [place] Study: [place] - Heart  Ordering Physician: HAVEN BARDALES  Referring Physician: SHONDA MERCHANT  Performed By: Yfn Unger RDCS     BSA: 1.9 m2  Height: 64 in  Weight: 182 lb  HR: 100  BP: 138/62 mmHg  _____________________________________________________________________________  __        Procedure  Limited Portable Echo Adult.  _____________________________________________________________________________  __        Interpretation Summary  Global and regional left ventricular function is hyperkinetic with an EF of  65-70%.  Right ventricular function, chamber size, wall motion, and thickness are  normal.  The inferior vena cava is normal.  1.7cm " loculated lateral pericardial effusion.  _____________________________________________________________________________  __        Left Ventricle  Global and regional left ventricular function is hyperkinetic with an EF of  65-70%. Left ventricular size is normal. Borderline concentric wall thickening  consistent with left ventricular hypertrophy is present. No regional wall  motion abnormalities are seen.     Right Ventricle  Right ventricular function, chamber size, wall motion, and thickness are  normal.     Vessels  The inferior vena cava is normal.     Pericardium  1.7cm loculated lateral pericardial effusion.     _____________________________________________________________________________  __  MMode/2D Measurements & Calculations  IVSd: 1.1 cm  LVIDd: 5.0 cm  LVIDs: 3.0 cm  LVPWd: 1.2 cm  FS: 39.1 %     LV mass(C)d: 227.1 grams  LV mass(C)dI: 120.8 grams/m2  asc Aorta Diam: 2.9 cm  LVOT diam: 2.5 cm  LVOT area: 4.8 cm2  RWT: 0.49        Doppler Measurements & Calculations  PA acc time: 0.13 sec     _____________________________________________________________________________  __           Report approved by: Ricardo Herzog 07/27/2020 09:29 AM      Cardiac Catheterization    Narrative      Successful endomyocardial biopsy. Results pending.    Fluoroscopy was used to visualize the right femoral vein.    Right sided filling pressures are mildly elevated.    Mild elevated Pulmonary Hypertension.    Left sided filling pressures are normal.    Normal cardiac output level.      XR Chest 2 Views    Narrative    EXAM: XR CHEST 2 VW  7/27/2020 2:45 PM     HISTORY:  interval       COMPARISON:  Radiographs of the chest from 7/26/2020    FINDINGS: Two views of the chest. Stable postsurgical changes of the  mediastinum. Sternotomy wires and vascular stent in place. Heart size  is within normal limits. Cardiomediastinal silhouette stable. Mild  left basilar opacities, not significantly changed from prior exam.  No  significant pleural effusion. No pneumothorax. No acute osseous  abnormality.      Impression    IMPRESSION:   1. Stable postoperative changes of the chest.    2.. Left basilar interstitial opacities without significant change in  appearance from prior exam and likely represent atelectasis versus  infection.    I have personally reviewed the examination and initial interpretation  and I agree with the findings.    KANDACE MENDOZA MD         Labs:  BMP  Recent Labs   Lab 07/27/20  0606 07/26/20  0545 07/25/20  0541 07/24/20  1631    140 138 138   POTASSIUM 4.6 4.7 4.4 4.1   CHLORIDE 109 109 108 105   BRYANNA 7.8* 8.0* 7.7* 7.9*   CO2 25 26 24 24   BUN 32* 37* 41* 39*   CR 1.31* 1.33* 1.40* 1.53*   * 166* 245* 250*     CBC  Recent Labs   Lab 07/27/20  0606 07/26/20  0545 07/25/20  0541 07/24/20  1241   WBC 9.1 9.2 6.6 9.4   RBC 2.52* 2.68* 2.58* 2.84*   HGB 7.5* 8.2* 7.7* 8.4*   HCT 24.7* 25.8* 24.7* 27.1*   MCV 98 96 96 95   MCH 29.8 30.6 29.8 29.6   MCHC 30.4* 31.8 31.2* 31.0*   RDW 16.6* 16.2* 16.0* 15.9*    216 172 144*     INR  No lab results found in last 7 days.   Liver Function Studies -   Recent Labs   Lab Test 07/22/20  0414   PROTTOTAL 5.5*   ALBUMIN 2.4*   BILITOTAL 0.4   ALKPHOS 75   AST 28   ALT 25     GLUCOSE:   Recent Labs   Lab 07/27/20  2143 07/27/20  1702 07/27/20  1206 07/27/20  0606 07/27/20  0314 07/26/20  2126 07/26/20  1727  07/26/20  0545  07/25/20  0541  07/24/20  1631  07/24/20  1241  07/24/20  0411   GLC  --   --   --  179*  --   --   --   --  166*  --  245*  --  250*  --  108*  --  92   * 192* 191*  --  152* 190* 317*   < >  --    < >  --    < >  --    < >  --    < >  --     < > = values in this interval not displayed.

## 2020-07-28 NOTE — PLAN OF CARE
End of Shift Summary. See flowsheets for vital signs and detailed assessment.    Changes this shift: PTT therapeutic with no rate change needed to bivalirubin gtt. Awaiting lab results this AM to determine if this is still the case. Groin site stable from [facility] yesterday. No acute changes overnight.     Plan:  Continue POC, support pt rehab

## 2020-07-28 NOTE — PLAN OF CARE
D: S/p heart transplant 07/20.     I/A: SR/ST on tele. TPM at [facility] - 90, rarely paced. Telugu as first language,  phone at bedside. BG coverage per orders. R robbin WNL, CMS+. PICC placed today. Angiomax gtt continues. PRN robaxin/heat for back soreness/discomfort. Transplant education done via phone with /pharmacist with pt and wife.      P: Nutrition and diabetes education consult in in anticipation for discharge near end of week. Echo tomorrow. Continue with plan of care. Angiomax gtt to be stopped at 0300 tomorrow, 07/29, to pull TPW in AM.

## 2020-07-28 NOTE — PROGRESS NOTES
Diabetes Consult Daily  Progress Note          Assessment/Plan:     Lucian Henderson Sr. is a 64 year old male with a PMH of end-stage ischemic cardiomyopathy, CAD s/p CABG 2008, DMT2, who was admitted to [facility] 07/03 for heart failure exacerbation with cardiogenic shock, underwent right subclavian IABP insertion 07/16 with Dr. Sparrow, and then heart transplant 07/20 with Dr. Griselli. 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      Post-prandial hyperglycemia improved to high 100s yesterday, using insulin to carb ratio rather than fixed meal dosing aspart.  May be able to manage with combination oral antihyperglycemics and NPH rather than using prandial aspart at home.    Plan    - NPH 30 units this morning and plan for 20 units this evening (prednisone 25 mg BID today)  -stop aspart 1 per 5 grams  -start glimepiride 6 mg qAM  -start sitagliptin 100 mg qAM  - continue aspart correction change to 2 per 30 mg/dL  - nutrition consult and diab educ consult in prep for discharge Wednesday--> or Thursday/Friday  - test claim for NPH       Outpatient diabetes follow up: Recommend video visit 2-7 days p discharge.  Msg p clin coord HIGH urgency to schedule  Plan discussed with patient, wife, bedside RN, and dietician    Scripts needed on discharge include: 1. Januvia 2. Humulin N/Novolin N    ADDM (1952)-  Reduce evening NPH further given good response to oral agents so far today, from 27 units to 15 units.           Interval History:     The last 24 hours progress and nursing notes reviewed.      Carb consumption Sunday  B- 90g-- 12 units aspart = 1 per 9 g  L- 104g-- 10 units aspart = 1 per 10 grams  S- 74g-- 10 units aspart = 1 per 7 grams    Yesterday switched to insulin to carb ratio and ate about 60 grams for two meals.    This morning eating a bfast like he would at home: omelette, melon, no carb in beverages  Feels really good and like to go home  "today.  But, got PICC.  Still needs pacer wires out.  More likely couple days until discharge.    Very happy to hear reason for higher dose Januvia is better kidney function.    Pleased to hear may not need prandial aspart    Humulin N pens - copay about 9.00/month    Recent Labs   Lab 07/28/20  0820 07/27/20  2143 07/27/20  1702 07/27/20  1206 07/27/20  0606 07/27/20  0314 07/26/20  2126  07/26/20  0545  07/25/20  0541  07/24/20  1631  07/24/20  1241  07/24/20  0411   GLC  --   --   --   --  179*  --   --   --  166*  --  245*  --  250*  --  108*  --  92   BGM 97 150* 192* 191*  --  152* 190*   < >  --    < >  --    < >  --    < >  --    < >  --     < > = values in this interval not displayed.           Nutrition:     Orders Placed This Encounter      Regular Diet Adult            PTA Regimen:     Lantus 40 units qpm, 8 qam.  Januvia 50 mg qam  Amaryl 4 mg  Reports no salt, no sugar, fasting BG 85 - 110.     7/26 \"I reached wife and she advised WAS taking 48 units daily Lantus, together with Januvia and Amaryl PTA.  He had tried substituting Bydureon for Januvia but had loose stools, bloating abdominal pain and did not like it.  He stopped and resumed Januvia after perhaps one month.          Review of Systems:   See interval hx          Medications:   Prednisone 30 mg bid--> 25 mg BID starting 7/27 AM--> to 20 mg BID AM 7/28         Physical Exam:     Gen: Alert, in NAD, in bed.  wife at bedside and supportive  HEENT: NC/AT, Eyes clear, Moist membranes  Resp: Unlabored  Neuro: oriented x3, communicating clearly.   Psych: calm, easily engaged  BP (!) 141/61 (BP Location: Left arm)   Pulse 91   Temp 98.5  F (36.9  C) (Oral)   Resp 16   Ht 1.626 m (5' 4\")   Wt 82.4 kg (181 lb 11.2 oz)   SpO2 96%   BMI 31.19 kg/m               Data:     Lab Results   Component Value Date    A1C 8.2 07/03/2020    A1C 7.9 07/08/2019    A1C 8.5 03/11/2019    A1C 7.6 10/16/2018    A1C 9.8 09/06/2017            Recent Labs   Lab Test " 07/28/20  1147 07/27/20  0606    141   POTASSIUM 4.6 4.6   CHLORIDE 110* 109   CO2 24 25   ANIONGAP 3 6   * 179*   BUN 31* 32*   CR 1.21 1.31*   BRYANNA 7.8* 7.8*     GFR Estimate   Date Value Ref Range Status   07/28/2020 62 >60 mL/min/[1.73_m2] Final     Comment:     Non  GFR Calc  Starting 12/18/2018, serum creatinine based estimated GFR (eGFR) will be   calculated using the Chronic Kidney Disease Epidemiology Collaboration   (CKD-EPI) equation.     07/27/2020 56 (L) >60 mL/min/[1.73_m2] Final     Comment:     Non  GFR Calc  Starting 12/18/2018, serum creatinine based estimated GFR (eGFR) will be   calculated using the Chronic Kidney Disease Epidemiology Collaboration   (CKD-EPI) equation.     07/26/2020 55 (L) >60 mL/min/[1.73_m2] Final     Comment:     Non  GFR Calc  Starting 12/18/2018, serum creatinine based estimated GFR (eGFR) will be   calculated using the Chronic Kidney Disease Epidemiology Collaboration   (CKD-EPI) equation.       GFR Estimate If Black   Date Value Ref Range Status   07/28/2020 71 >60 mL/min/[1.73_m2] Final     Comment:      GFR Calc  Starting 12/18/2018, serum creatinine based estimated GFR (eGFR) will be   calculated using the Chronic Kidney Disease Epidemiology Collaboration   (CKD-EPI) equation.     07/27/2020 65 >60 mL/min/[1.73_m2] Final     Comment:      GFR Calc  Starting 12/18/2018, serum creatinine based estimated GFR (eGFR) will be   calculated using the Chronic Kidney Disease Epidemiology Collaboration   (CKD-EPI) equation.     07/26/2020 64 >60 mL/min/[1.73_m2] Final     Comment:      GFR Calc  Starting 12/18/2018, serum creatinine based estimated GFR (eGFR) will be   calculated using the Chronic Kidney Disease Epidemiology Collaboration   (CKD-EPI) equation.         CBC RESULTS:   Recent Labs   Lab Test 07/28/20  1147   WBC 9.2   RBC 2.61*   HGB 7.9*   HCT 25.7*   MCV 99    MCH 30.3   MCHC 30.7*   RDW 17.1*      I spent a total of 35 minutes bedside and on the inpatient unit managing the glycemic care of Lucina Henderson Sr.. Over 50% of my time on the unit was spent counseling the patient and wife and/or coordinating care regarding transition plan for BG management w/ steroids.  See note for details.    Liyah Damon APRN [facility] [phone]  Diabetes Management Team job code: [phone]

## 2020-07-28 NOTE — PROGRESS NOTES
Vascular Access Services Notes:    Patient was NOT available for PICC placement per Nery (6C RN).        Aba Barger, BSN, RN [facility]

## 2020-07-28 NOTE — PROGRESS NOTES
Transplant Social Work Services Progress Note      Date of Initial Social Work Evaluation: 7/9/2019, 7/6/2020  Collaborated with: Pt and his wife, Shivani, at the bedside    Data: Pt is s/p OHT on 7/19/2020. Pt transferred to the floor on 7/24. Met w/ pt using video  IPad. Pt is doing well with therapies and anticipated he will be able to go home. Pt's wife is pt's designated 24hr caregiver for the 30 days following hospital discharge. Today pt and wife went over medications with pharmacist.    Intervention: Supportive Visit   Assessment: Pt is coping very well post-transplant. Pt and wife so thankful for receiving the heart and the care he has received. We did discuss how transplant and medications can affect a pt's mental health. Pt reports he has had no concerns with his mood and feels he is coping well with situation.   Pt and wife feel that medication education went well and have no concerns. The are understandably nervous and anxious with transition home, but feel better after reviewing the medications w/ pharmacist.   Education provided by SW: Ongoing Social Work support post-transplant in the inpatient and outpatient setting, caregiver plan and expectations  Plan:    Discharge Plans in Progress: Anticipate home.     Barriers to d/c plan: Medical Readiness.     Follow up Plan: Writer to continue to follow.

## 2020-07-28 NOTE — PLAN OF CARE
PT -   Discharge Planner PT   Patient plan for discharge: home with 24hr assist from family members, prefers OP CR over home PT.   Current status: Min-A sit<>stand progressing to CGA with min cues for sit <> stand technique and sternal precautions. Overall limited by back pain, but improved from am d/t meds. Standing marching required close guarding for safety given narrow XENA and quick tempo.  Barriers to return to prior living situation: fatigue, back pain, balance, endurance, LE weakness, medical, stair negotiation  Recommendations for discharge: Anticipate home and 24hr assist with OP CR, however, must demo 3 stairs safely, preferably with spouse guarding.   Rationale for recommendations: Pt IND at baseline and will need ongoing skilled PT intervention to improve independence and safety with functional mobility skills prior to returning home. Has support at home.  Entered by: James Mejia 07/28/2020 4:47 PM

## 2020-07-28 NOTE — PROCEDURES
[facility] [place]    Double Lumen PICC Placement    Date/Time: 7/28/2020 11:07 AM  Performed by: Aba Barger RN  Authorized by: Donald Rand PA-C   Indications: vascular access    UNIVERSAL PROTOCOL   Site Marked: Yes  Prior Images Obtained and Reviewed:  Yes  Required items: Required blood products, implants, devices and special equipment available    Patient identity confirmed:  Verbally with patient, arm band, provided demographic data and hospital-assigned identification number  NA - No sedation, light sedation, or local anesthesia  Confirmation Checklist:  Patient's identity using two indicators, relevant allergies, procedure was appropriate and matched the consent or emergent situation and correct equipment/implants were available  Time out: Immediately prior to the procedure a time out was called    Universal Protocol: the [facility] Universal Protocol was followed    Preparation: Patient was prepped and draped in usual sterile fashion           ANESTHESIA    Anesthesia: See MAR for details  Local Anesthetic:  Lidocaine 1% without epinephrine  Anesthetic Total (mL):  1      SEDATION    Patient Sedated: No        Preparation: skin prepped with ChloraPrep  Skin prep agent: skin prep agent completely dried prior to procedure  Sterile barriers: maximum sterile barriers were used: cap, mask, sterile gown, sterile gloves, and large sterile sheet  Hand hygiene: hand hygiene performed prior to central venous catheter insertion  Type of line used: Power PICC  Catheter type: double lumen  Lumen type: valved  Catheter size: 5 Fr  Brand: other (see comment) (BioFlo)  Lot number: [id number]  Placement method: venipuncture, MST, ultrasound and tip confirmation system  Number of attempts: 1  Orientation: left  Location: basilic vein (vein diameter - 0.62 cm)  Arm circumference: adults 10 cm  Extremity circumference: 30  Visible catheter length: 1  Total catheter length:  42  Dressing and securement: chlorhexidine disc applied, securement device, site cleaned, statlock and sterile dressing applied  Post procedure assessment: blood return through all ports and placement verified by x-ray  PROCEDURE   Patient Tolerance:  Patient tolerated the procedure well with no immediate complications

## 2020-07-28 NOTE — TELEPHONE ENCOUNTER
Pharmacist provided medication teaching for discharge with a focus on new medications/dose changes.  The discharge medication list was reviewed with the patient/family and the following points were discussed, as applicable: Name, description, purpose, dose/strength, duration of medications, common side effects, food/medications to avoid, action to be taken if dose is missed, when to call MD, safe disposal of unused medications and how to obtain refills.  Education was performed via phone with . Patient's spouse was also present and participated as well.     Patient chooses to receive medications from  specialty pharmacy.     Patient will  transplant supplies including 7 day pill organizer and bp monitor, at discharge pharmacy along with medications.     Clinical Pharmacy Consult:                                                      Transplant Specific:   Date of Transplant: 07/19/2020  Type of Transplant: heart  First Transplant: yes  History of rejection: no    Immunosuppression Regimen   TAC 0.5mg qAM & 0.5mg qPM, Prednisone 25mg qAM & 25mg qPM and MMF 1500mg qAM & 1500mg qPM  Patient specific goal: tac: 10-12  Most recent level: Not available yet  Pt adherent to lab draws: yes  Scr:   Lab Results   Component Value Date    CR 1.21 07/28/2020     Side effects: no side effects    Prophylactic Medications  Antibacterial:  Bactrim 400/80 once daily  Scheduled Discontinue Date: 6 months    Antifungal: Nystatin  Scheduled Discontinue Date: 6 months    Antiviral: CrCl 40 to 59 mL/minute: Valcyte 450 mg once daily   Scheduled Discontinue Date: 3 months    Acid Reducer: Protonix (pantoprazole)  Scheduled Reviewed Date: 6 months    Thrombosis Prevention: Aspirin 81 mg PO daily  Scheduled Discontinue Date: per MD    Blood Pressure Management  Frequency of home Blood Pressure checks: once daily  Most recent home BP: 148/60  Hospitalizations/ER visits since last assessment: 0      Med rec/DUR  performed: yes  Med Rec Discrepancies: no    Reminders:    1.  Bring to first clinic appt: med box, med card, bp monitor, all medications being taken, and lab book.  2.    MTM pharmacist visit on first clinic appt and if ok, again in 3 to 4 months during follow up appt.  3.   Avoid Grapefruit and Grapefruit juice.   4.   Avoid herbal supplements. If wish to take other medications or supplements, call your coordinator.   5.   Keep lab appts.   6.  Can use apps on phone like [employer] to help manage medication lists and reminders.   7.  Make sure you are protecting your skin by wearing long sleeves and applying sunscreen to exposed skin, for any significant time in the sun.     Transplant Coordinator is Christelle Malhotra RP

## 2020-07-29 ENCOUNTER — APPOINTMENT (OUTPATIENT)
Dept: CARDIOLOGY | Facility: CLINIC | Age: 65
DRG: 001 | End: 2020-07-29
Attending: PHYSICIAN ASSISTANT
Payer: COMMERCIAL

## 2020-07-29 ENCOUNTER — APPOINTMENT (OUTPATIENT)
Dept: PHYSICAL THERAPY | Facility: CLINIC | Age: 65
DRG: 001 | End: 2020-07-29
Attending: INTERNAL MEDICINE
Payer: COMMERCIAL

## 2020-07-29 ENCOUNTER — APPOINTMENT (OUTPATIENT)
Dept: OCCUPATIONAL THERAPY | Facility: CLINIC | Age: 65
DRG: 001 | End: 2020-07-29
Attending: INTERNAL MEDICINE
Payer: COMMERCIAL

## 2020-07-29 ENCOUNTER — VIRTUAL VISIT (OUTPATIENT)
Dept: INTERPRETER SERVICES | Facility: CLINIC | Age: 65
End: 2020-07-29

## 2020-07-29 LAB
ANION GAP SERPL CALCULATED.3IONS-SCNC: 6 MMOL/L (ref 3–14)
APTT PPP: 35 SEC (ref 22–37)
APTT PPP: 48 SEC (ref 22–37)
BUN SERPL-MCNC: 35 MG/DL (ref 7–30)
CALCIUM SERPL-MCNC: 7.8 MG/DL (ref 8.5–10.1)
CHLORIDE SERPL-SCNC: 110 MMOL/L (ref 94–109)
CO2 SERPL-SCNC: 23 MMOL/L (ref 20–32)
CREAT SERPL-MCNC: 1.33 MG/DL (ref 0.66–1.25)
ERYTHROCYTE [DISTWIDTH] IN BLOOD BY AUTOMATED COUNT: 17.1 % (ref 10–15)
GFR SERPL CREATININE-BSD FRML MDRD: 55 ML/MIN/{1.73_M2}
GLUCOSE BLDC GLUCOMTR-MCNC: 186 MG/DL (ref 70–99)
GLUCOSE BLDC GLUCOMTR-MCNC: 252 MG/DL (ref 70–99)
GLUCOSE BLDC GLUCOMTR-MCNC: 309 MG/DL (ref 70–99)
GLUCOSE BLDC GLUCOMTR-MCNC: 332 MG/DL (ref 70–99)
GLUCOSE SERPL-MCNC: 167 MG/DL (ref 70–99)
HCT VFR BLD AUTO: 24.4 % (ref 40–53)
HGB BLD-MCNC: 7.5 G/DL (ref 13.3–17.7)
MAGNESIUM SERPL-MCNC: 2.2 MG/DL (ref 1.6–2.3)
MCH RBC QN AUTO: 29.9 PG (ref 26.5–33)
MCHC RBC AUTO-ENTMCNC: 30.7 G/DL (ref 31.5–36.5)
MCV RBC AUTO: 97 FL (ref 78–100)
PLATELET # BLD AUTO: 320 10E9/L (ref 150–450)
POTASSIUM SERPL-SCNC: 4.9 MMOL/L (ref 3.4–5.3)
RBC # BLD AUTO: 2.51 10E12/L (ref 4.4–5.9)
SODIUM SERPL-SCNC: 138 MMOL/L (ref 133–144)
TACROLIMUS BLD-MCNC: <3 UG/L (ref 5–15)
TME LAST DOSE: ABNORMAL H
WBC # BLD AUTO: 8 10E9/L (ref 4–11)

## 2020-07-29 PROCEDURE — 83735 ASSAY OF MAGNESIUM: CPT | Performed by: THORACIC SURGERY (CARDIOTHORACIC VASCULAR SURGERY)

## 2020-07-29 PROCEDURE — 25000128 H RX IP 250 OP 636: Performed by: PHYSICIAN ASSISTANT

## 2020-07-29 PROCEDURE — 25000132 ZZH RX MED GY IP 250 OP 250 PS 637: Performed by: PHYSICIAN ASSISTANT

## 2020-07-29 PROCEDURE — 25000132 ZZH RX MED GY IP 250 OP 250 PS 637: Performed by: INTERNAL MEDICINE

## 2020-07-29 PROCEDURE — 25000131 ZZH RX MED GY IP 250 OP 636 PS 637: Performed by: INTERNAL MEDICINE

## 2020-07-29 PROCEDURE — 25000131 ZZH RX MED GY IP 250 OP 636 PS 637: Performed by: STUDENT IN AN ORGANIZED HEALTH CARE EDUCATION/TRAINING PROGRAM

## 2020-07-29 PROCEDURE — 93325 DOPPLER ECHO COLOR FLOW MAPG: CPT | Mod: 26 | Performed by: INTERNAL MEDICINE

## 2020-07-29 PROCEDURE — 25000132 ZZH RX MED GY IP 250 OP 250 PS 637: Performed by: SURGERY

## 2020-07-29 PROCEDURE — 97110 THERAPEUTIC EXERCISES: CPT | Mod: GO

## 2020-07-29 PROCEDURE — 97530 THERAPEUTIC ACTIVITIES: CPT | Mod: GP | Performed by: REHABILITATION PRACTITIONER

## 2020-07-29 PROCEDURE — 93321 DOPPLER ECHO F-UP/LMTD STD: CPT | Mod: 26 | Performed by: INTERNAL MEDICINE

## 2020-07-29 PROCEDURE — 80048 BASIC METABOLIC PNL TOTAL CA: CPT | Performed by: THORACIC SURGERY (CARDIOTHORACIC VASCULAR SURGERY)

## 2020-07-29 PROCEDURE — 93321 DOPPLER ECHO F-UP/LMTD STD: CPT

## 2020-07-29 PROCEDURE — 85027 COMPLETE CBC AUTOMATED: CPT | Performed by: THORACIC SURGERY (CARDIOTHORACIC VASCULAR SURGERY)

## 2020-07-29 PROCEDURE — 85730 THROMBOPLASTIN TIME PARTIAL: CPT | Performed by: PHYSICIAN ASSISTANT

## 2020-07-29 PROCEDURE — 25000132 ZZH RX MED GY IP 250 OP 250 PS 637: Performed by: STUDENT IN AN ORGANIZED HEALTH CARE EDUCATION/TRAINING PROGRAM

## 2020-07-29 PROCEDURE — 93308 TTE F-UP OR LMTD: CPT | Mod: 26 | Performed by: INTERNAL MEDICINE

## 2020-07-29 PROCEDURE — T1013 SIGN LANG/ORAL INTERPRETER: HCPCS | Mod: U3,TEL

## 2020-07-29 PROCEDURE — 25800030 ZZH RX IP 258 OP 636: Performed by: PHYSICIAN ASSISTANT

## 2020-07-29 PROCEDURE — 25000132 ZZH RX MED GY IP 250 OP 250 PS 637: Performed by: PEDIATRICS

## 2020-07-29 PROCEDURE — 25000132 ZZH RX MED GY IP 250 OP 250 PS 637: Performed by: CLINICAL NURSE SPECIALIST

## 2020-07-29 PROCEDURE — 21400000 ZZH R&B CCU UMMC

## 2020-07-29 PROCEDURE — 85730 THROMBOPLASTIN TIME PARTIAL: CPT | Performed by: THORACIC SURGERY (CARDIOTHORACIC VASCULAR SURGERY)

## 2020-07-29 PROCEDURE — 97116 GAIT TRAINING THERAPY: CPT | Mod: GP | Performed by: REHABILITATION PRACTITIONER

## 2020-07-29 PROCEDURE — 36592 COLLECT BLOOD FROM PICC: CPT | Performed by: PHYSICIAN ASSISTANT

## 2020-07-29 PROCEDURE — 97535 SELF CARE MNGMENT TRAINING: CPT | Mod: GO

## 2020-07-29 PROCEDURE — 80197 ASSAY OF TACROLIMUS: CPT | Performed by: STUDENT IN AN ORGANIZED HEALTH CARE EDUCATION/TRAINING PROGRAM

## 2020-07-29 PROCEDURE — 00000146 ZZHCL STATISTIC GLUCOSE BY METER IP

## 2020-07-29 PROCEDURE — 99232 SBSQ HOSP IP/OBS MODERATE 35: CPT | Mod: 25 | Performed by: INTERNAL MEDICINE

## 2020-07-29 RX ORDER — HYDRALAZINE HYDROCHLORIDE 25 MG/1
25 TABLET, FILM COATED ORAL EVERY 8 HOURS SCHEDULED
Status: DISCONTINUED | OUTPATIENT
Start: 2020-07-29 | End: 2020-08-01

## 2020-07-29 RX ORDER — TACROLIMUS 1 MG/1
2 CAPSULE ORAL ONCE
Status: DISCONTINUED | OUTPATIENT
Start: 2020-07-29 | End: 2020-07-29

## 2020-07-29 RX ORDER — LIDOCAINE 4 G/G
1 PATCH TOPICAL
Status: DISCONTINUED | OUTPATIENT
Start: 2020-07-29 | End: 2020-08-03 | Stop reason: HOSPADM

## 2020-07-29 RX ORDER — TERBUTALINE SULFATE 5 MG/1
5 TABLET ORAL EVERY 6 HOURS SCHEDULED
Status: DISCONTINUED | OUTPATIENT
Start: 2020-07-29 | End: 2020-07-30

## 2020-07-29 RX ORDER — FUROSEMIDE 40 MG
40 TABLET ORAL DAILY
Status: DISCONTINUED | OUTPATIENT
Start: 2020-07-30 | End: 2020-07-31

## 2020-07-29 RX ORDER — TACROLIMUS 1 MG/1
1 CAPSULE ORAL ONCE
Status: COMPLETED | OUTPATIENT
Start: 2020-07-29 | End: 2020-07-29

## 2020-07-29 RX ORDER — FONDAPARINUX SODIUM 7.5 MG/.6ML
7.5 INJECTION SUBCUTANEOUS EVERY 24 HOURS
Status: DISCONTINUED | OUTPATIENT
Start: 2020-07-29 | End: 2020-08-03 | Stop reason: HOSPADM

## 2020-07-29 RX ORDER — FUROSEMIDE 20 MG
20 TABLET ORAL ONCE
Status: COMPLETED | OUTPATIENT
Start: 2020-07-29 | End: 2020-07-29

## 2020-07-29 RX ADMIN — INSULIN ASPART 2 UNITS: 100 INJECTION, SOLUTION INTRAVENOUS; SUBCUTANEOUS at 08:13

## 2020-07-29 RX ADMIN — Medication 25 MG: at 13:33

## 2020-07-29 RX ADMIN — PANTOPRAZOLE SODIUM 40 MG: 40 TABLET, DELAYED RELEASE ORAL at 08:12

## 2020-07-29 RX ADMIN — TERBUTALINE SULFATE 5 MG: 5 TABLET ORAL at 17:40

## 2020-07-29 RX ADMIN — MYCOPHENOLATE MOFETIL 1500 MG: 250 CAPSULE ORAL at 08:13

## 2020-07-29 RX ADMIN — NYSTATIN 1000000 UNITS: 100000 SUSPENSION ORAL at 20:23

## 2020-07-29 RX ADMIN — SITAGLIPTIN 100 MG: 100 TABLET, FILM COATED ORAL at 08:12

## 2020-07-29 RX ADMIN — LIDOCAINE 1 PATCH: 560 PATCH PERCUTANEOUS; TOPICAL; TRANSDERMAL at 14:10

## 2020-07-29 RX ADMIN — TERBUTALINE SULFATE 10 MG: 5 TABLET ORAL at 05:15

## 2020-07-29 RX ADMIN — NYSTATIN 1000000 UNITS: 100000 SUSPENSION ORAL at 12:12

## 2020-07-29 RX ADMIN — TACROLIMUS 1 MG: 1 CAPSULE ORAL at 17:40

## 2020-07-29 RX ADMIN — METHOCARBAMOL 500 MG: 500 TABLET, FILM COATED ORAL at 14:04

## 2020-07-29 RX ADMIN — Medication 25 MG: at 21:39

## 2020-07-29 RX ADMIN — TACROLIMUS 1 MG: 1 CAPSULE ORAL at 08:13

## 2020-07-29 RX ADMIN — MYCOPHENOLATE MOFETIL 1500 MG: 250 CAPSULE ORAL at 17:40

## 2020-07-29 RX ADMIN — FONDAPARINUX SODIUM 7.5 MG: 7.5 INJECTION, SOLUTION SUBCUTANEOUS at 17:40

## 2020-07-29 RX ADMIN — INSULIN ASPART 14 UNITS: 100 INJECTION, SOLUTION INTRAVENOUS; SUBCUTANEOUS at 17:40

## 2020-07-29 RX ADMIN — PREDNISONE 20 MG: 20 TABLET ORAL at 08:13

## 2020-07-29 RX ADMIN — ACETAMINOPHEN 975 MG: 325 TABLET, FILM COATED ORAL at 14:04

## 2020-07-29 RX ADMIN — PREDNISONE 20 MG: 20 TABLET ORAL at 20:23

## 2020-07-29 RX ADMIN — SULFAMETHOXAZOLE AND TRIMETHOPRIM 1 TABLET: 400; 80 TABLET ORAL at 08:12

## 2020-07-29 RX ADMIN — TAMSULOSIN HYDROCHLORIDE 0.4 MG: 0.4 CAPSULE ORAL at 08:12

## 2020-07-29 RX ADMIN — ROSUVASTATIN CALCIUM 5 MG: 5 TABLET, FILM COATED ORAL at 08:12

## 2020-07-29 RX ADMIN — ASPIRIN 81 MG CHEWABLE TABLET 81 MG: 81 TABLET CHEWABLE at 08:12

## 2020-07-29 RX ADMIN — NYSTATIN 1000000 UNITS: 100000 SUSPENSION ORAL at 08:12

## 2020-07-29 RX ADMIN — NYSTATIN 1000000 UNITS: 100000 SUSPENSION ORAL at 17:40

## 2020-07-29 RX ADMIN — FUROSEMIDE 20 MG: 20 TABLET ORAL at 08:18

## 2020-07-29 RX ADMIN — GLIMEPIRIDE 6 MG: 4 TABLET ORAL at 08:12

## 2020-07-29 RX ADMIN — BIVALIRUDIN 0.08 MG/KG/HR: 250 INJECTION, POWDER, LYOPHILIZED, FOR SOLUTION INTRAVENOUS at 10:52

## 2020-07-29 RX ADMIN — Medication 12.5 MG: at 05:15

## 2020-07-29 RX ADMIN — TERBUTALINE SULFATE 5 MG: 5 TABLET ORAL at 12:12

## 2020-07-29 RX ADMIN — FUROSEMIDE 20 MG: 20 TABLET ORAL at 17:40

## 2020-07-29 RX ADMIN — VALGANCICLOVIR 450 MG: 450 TABLET, FILM COATED ORAL at 08:12

## 2020-07-29 ASSESSMENT — PAIN DESCRIPTION - DESCRIPTORS
DESCRIPTORS: ACHING;SORE
DESCRIPTORS: ACHING;SORE
DESCRIPTORS: ACHING;SHARP

## 2020-07-29 ASSESSMENT — ACTIVITIES OF DAILY LIVING (ADL)
ADLS_ACUITY_SCORE: 12

## 2020-07-29 ASSESSMENT — MIFFLIN-ST. JEOR: SCORE: 1525.16

## 2020-07-29 NOTE — PROGRESS NOTES
Diabetes Consult Daily  Progress Note          Assessment/Plan:     Lucian Henderson Sr. is a 64 year old male with a PMH of end-stage ischemic cardiomyopathy, CAD s/p CABG 2008, DMT2, who was admitted to [facility] 07/03 for heart failure exacerbation with cardiogenic shock, underwent right subclavian IABP insertion 07/16 with Dr. Sparrow, and then heart transplant 07/20 with Dr. Griselli. 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      BG control largely in target on combination oral agents and NPH    Plan    - reduce NPH 27 units this morning and continue 15 units qPM  (prednisone 20 mg BID today)  - glimepiride 6 mg qAM  - sitagliptin 100 mg qAM  - continue aspart correction change to 2 per 30 mg/dL (for inpatient use)  - nutrition consult and diab educ consult in prep for discharge Thursday  - test claim for NPH =  $8.95/month       Outpatient diabetes follow up: Recommend video visit 2-7 days p discharge.  Msg p clin coord HIGH urgency to schedule  Plan discussed with patient, wife, bedside RN, primary team and dietician    Scripts needed on discharge include: 1. Januvia 2. Humulin N pen devices, Possibly supplies, possibly glimepiride        Tentative home diabetes plan pasted into AVS           Interval History:     The last 24 hours progress and nursing notes reviewed.      Carb consumption Sunday  B- 90g-- 12 units aspart = 1 per 9 g  L- 104g-- 10 units aspart = 1 per 10 grams  S- 74g-- 10 units aspart = 1 per 7 grams    Monday and Tuesday, pt consistently eating 60 grams/meal.  Reports had a snack last evening after supper.  This morning bfast up to 87 grams.      Pacer wires coming out today.  Pt feeling well.  Very hopeful for discharge tomorrow.  Reviewed written diabetes treatment plan.  Wife stating understanding.  She has many questions about food safety        Recent Labs   Lab 07/29/20  0655 07/29/20  0156 07/28/20  2229 07/28/20 2030  "07/28/20  1749 07/28/20  1221 07/28/20  1147 07/28/20  0820  07/27/20  0606  07/26/20  0545  07/25/20  0541  07/24/20  1631   *  --   --   --   --   --  142*  --   --  179*  --  166*  --  245*  --  250*   BGM  --  186* 168* 221* 167* 144*  --  97   < >  --    < >  --    < >  --    < >  --     < > = values in this interval not displayed.           Nutrition:     Orders Placed This Encounter      Regular Diet Adult            PTA Regimen:     Lantus 40 units qpm, 8 qam.  Januvia 50 mg qam  Amaryl 4 mg  Reports no salt, no sugar, fasting BG 85 - 110.     7/26 \"I reached wife and she advised WAS taking 48 units daily Lantus, together with Januvia and Amaryl PTA.  He had tried substituting Bydureon for Januvia but had loose stools, bloating abdominal pain and did not like it.  He stopped and resumed Januvia after perhaps one month.          Review of Systems:   See interval hx          Medications:   Prednisone 30 mg bid--> 25 mg BID starting 7/27 AM--> to 20 mg BID AM 7/28         Physical Exam:     Gen: Alert, in NAD, in bed.  wife at bedside and supportive  HEENT: NC/AT, Eyes clear, Moist membranes  Resp: Unlabored at rest  Ext: mild LE edema  Neuro: oriented x3, communicating clearly.   Psych: calm, easily engaged  BP (!) 151/65 (BP Location: Left arm)   Pulse 91   Temp 98.5  F (36.9  C) (Oral)   Resp 18   Ht 1.626 m (5' 4\")   Wt 83.4 kg (183 lb 14.4 oz)   SpO2 97%   BMI 31.57 kg/m               Data:     Lab Results   Component Value Date    A1C 8.2 07/03/2020    A1C 7.9 07/08/2019    A1C 8.5 03/11/2019    A1C 7.6 10/16/2018    A1C 9.8 09/06/2017     CBC RESULTS:   Recent Labs   Lab Test 07/29/20  0655   WBC 8.0   RBC 2.51*   HGB 7.5*   HCT 24.4*   MCV 97   MCH 29.9   MCHC 30.7*   RDW 17.1*        Recent Labs   Lab Test 07/29/20  0655 07/28/20  1147    137   POTASSIUM 4.9 4.6   CHLORIDE 110* 110*   CO2 23 24   ANIONGAP 6 3   * 142*   BUN 35* 31*   CR 1.33* 1.21   BRYANNA 7.8* 7.8* "            GFR Estimate   Date Value Ref Range Status   07/29/2020 55 (L) >60 mL/min/[1.73_m2] Final     Comment:     Non  GFR Calc  Starting 12/18/2018, serum creatinine based estimated GFR (eGFR) will be   calculated using the Chronic Kidney Disease Epidemiology Collaboration   (CKD-EPI) equation.     07/28/2020 62 >60 mL/min/[1.73_m2] Final     Comment:     Non  GFR Calc  Starting 12/18/2018, serum creatinine based estimated GFR (eGFR) will be   calculated using the Chronic Kidney Disease Epidemiology Collaboration   (CKD-EPI) equation.     07/27/2020 56 (L) >60 mL/min/[1.73_m2] Final     Comment:     Non  GFR Calc  Starting 12/18/2018, serum creatinine based estimated GFR (eGFR) will be   calculated using the Chronic Kidney Disease Epidemiology Collaboration   (CKD-EPI) equation.       GFR Estimate If Black   Date Value Ref Range Status   07/29/2020 64 >60 mL/min/[1.73_m2] Final     Comment:      GFR Calc  Starting 12/18/2018, serum creatinine based estimated GFR (eGFR) will be   calculated using the Chronic Kidney Disease Epidemiology Collaboration   (CKD-EPI) equation.     07/28/2020 71 >60 mL/min/[1.73_m2] Final     Comment:      GFR Calc  Starting 12/18/2018, serum creatinine based estimated GFR (eGFR) will be   calculated using the Chronic Kidney Disease Epidemiology Collaboration   (CKD-EPI) equation.     07/27/2020 65 >60 mL/min/[1.73_m2] Final     Comment:      GFR Calc  Starting 12/18/2018, serum creatinine based estimated GFR (eGFR) will be   calculated using the Chronic Kidney Disease Epidemiology Collaboration   (CKD-EPI) equation.         I spent a total of 35 minutes bedside and on the inpatient unit managing the glycemic care of Lucian Henderson Sr.. Over 50% of my time on the unit was spent counseling the patient and wife and/or coordinating care regarding  transition plan for BG management w/  steroids, carbohydrate intake plan for home.  See note for details.    Liyah Damon APRN [phone]  Diabetes Management Team job code: [phone]

## 2020-07-29 NOTE — PLAN OF CARE
PT -   Discharge Planner PT   Patient plan for discharge: home with 24hr assist from family members, prefers OP CR over home PT.   Current status: Improved to CGA sit<>stand from varied heights with less cues for  sit <> stand technique and sternal precautions. Pt declines stairs and short session due to worsening low back pain radiating down LLE to knee.  Progressed from FWW to no AD for gait in sanders with only mild increase in unsteadiness.  Pt & RN agree with plan for set time for next PT session to maximize pt's pain control prior so can assess stair negotiation & determine safe disch plan.  Barriers to return to prior living situation: fatigue, back pain, balance, endurance, LE weakness, medical, stair negotiation  Recommendations for discharge: Anticipate home and 24hr assist with OP CR, however, must demo 3 stairs safely, preferably with spouse guarding. Need to determine if needs ambulatory AD or not for safe disch as well.   Rationale for recommendations: Pt IND at baseline and will need ongoing skilled PT intervention to improve independence and safety with functional mobility skills prior to returning home. Has support at home.

## 2020-07-29 NOTE — PROVIDER NOTIFICATION
D/I/A: Upon shift change, pt  on monitor, pt had 7bt run  and also appeared to be inappropriately pacing and having conduction changes with wider complex.  Called CVTS on call-recommended calling cards on call.  Cards notified and here to assess pt and rhythm.  Underlying rhythm ST with PM disconnected, so cards fellow stated to just cap wires and monitor.  Pt aware of plan.   P: Anticipate removal of TPW later this am.

## 2020-07-29 NOTE — PLAN OF CARE
"  D.   S/p OHT 7/20. Hx: CAD s/p CABG 2008, DMT2. Pt was admitted 7/3 due to HF exacerbation and cardiogenic shock. 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     I/A   Neuro: ALOx4. Neuro unchanged.  L pupil > R/not new per nursing charting review and pt also said he had L eye surgery in the past.  Cardiac: SR on tele and rarely paced. TPW programed AAI at a back up rate of 90. (2 V leads connected to pacer and one of the A leads is  Not connected to TP (clarified this  with CV ICU surgery on call at 1630 if this was intentional).   Per MD, ok to cap one of the A leads that is not connected to pacer.   Respiratory: on RA. Denied SOB. LSC dim at bases. Using IS  GI: Appetite is fair.   : Good  UO. Keeping a strict I and O\"s.   Activity: with assist x1-2.   Pain: Denied.   Skin: Bruised skin. Incisoins were cleaned.   LDA's: TPW. PICC. PIV infusing Angiomax at 0.08mg/kg/min    P.   Hold Angiomax gtt at 0300, 7/29,  for TPW removal in am         "

## 2020-07-29 NOTE — PLAN OF CARE
Pt had pacer wires removed this morning. Has maintained primarily NSR but did have 10sec episode of Aflutter 140s earlier today. Terbulatine dose decreased to 5mg-Cards 2 weaning off.  Angiomax gtt stopped and pt given first dose of subcutaneous arixtra 2hrs later.  Pt denied pain most of the day but when he tried to work with PT in the gym, he reported significant lower backpain that prevented him from fully participating. Lidocaine patch was placed and pt encouraged to take scheduled tylenol and try robaxin. Later-aquaK pad also ordered & lido patch removed per pt preference.  Continues on oral lasix-continues to have mild edema in LEs. Encouraging pt to keep legs elevated as able.  Diabetic educator reviewed current plan with insulin and oral meds.   Pt repeatedly instructed on need for him to call before he eats to check BG, but for lunch and for dinner, pt was found to have already eaten most of meal before glucose could be monitored.   Transplant coordinator plans to do further discharge teaching with pt some time tomorrow.  Tacro tough level to be drawn at 0600 for time-specific levels.

## 2020-07-29 NOTE — PROGRESS NOTES
Cardiovascular Surgery Progress Note  07/29/2020         Assessment and Plan:     Lucian Henderson Sr. is a 64 year old male with a PMH of end-stage ischemic cardiomyopathy, CAD s/p CABG 2008, DMT2, who was admitted to [facility] 07/03 for heart failure exacerbation with cardiogenic shock, underwent right subclavian IABP insertion 07/16 with Dr. Sparrow, and then heart transplant 07/20 with Dr. Griselli. 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    Cardiovascular:   S/p heart transplant  Primary graft dysfunction, improved  TTE 07/20 with EF 20-25%, severe RV dysfunction, suspected due to ischemic time. TTE 07/21 with EF improved to 50-55%. CI robust off pressors/inotrope 07/24. Repeat Echo 7/27 with normal LVEF  - -120s, improved today. Hydralazine mg TID on 7/27, titrate as able  - Appears mildly hypervolemic with LE edema, received lasix 20 mg IV x1 on 7/26, RHC with normal filling pressures, diuresis per cardiology   - Chest tubes: removed 7/26, f/u CXR stable  - TPW: HR NSR 90s-100. Terbutaline 10 mg q6H, removed TPW 7/29 after bival held for 5 hours.   Immunosuppression per Cards 2  - Simulect induction  - MMF 1500 mg BID  - Tacrolimus start 07/26  - Prednisone taper per cardiology  Serologies/Prophylaxis per Cards 2  - CMV: R+ / D+  - EBV: R+ / D+  - Valcyte 450 mg daily 7/24  - Bacrtim 07/26  - Nystatin s/s   - Aspirin 81 mg daily   - Consider starting statin (LFTs 7/25 WNL)   Pericardial effusion  Found incidentally on echo 7/27, 1.7 cm. HDS   - Repeat echo today (7/29) with decreased size in pericardial effusion    Pulmonary:  Postop mechanical ventilation, resolved  CHANTEL  - Extubated POD 2; Saturating well on RA.   - Supplemental O2 PRN to keep sats > 92%. Wean off as tolerated.  - Pulm toilet, IS, activity and deep breathing  - Resume CPAP at night, patient requests to use hospital CPAP    Neurology / MSK:  Postop pain  - Tylenol scheduled  - Oxycodone PRN  -  Dilaudid IV PRN  - Robaxin PRN     / Renal:  CKD 3  - Cr stable, trend daily  - Avoid nephrotoxins  - Diuresis as above  Urinary retention  - Tamsulosin 0.4 mg daily, voiding without issues    GI / FEN:   Nutrition  - Diabetic diet, continue bowel regimen    Endocrine:  DMT2  Stress/medication induced induced hyperglycemia  - Consult to endocrine for blood sugar management, appreciate assistance    Infectious Disease:  Leukocytosis, resolved  Donor Streptococcus salivarius bacteremia  - WBC WNL, afebrile, no signs or symptoms of infection  - ID consulted. Completed perioperative antibiotics. Completed 7 day course of Ceftriaxone 2g q24H for donor bacteremia (ended 7/25)  - Blood culture 7/21 NG    Hematology:   Acute postop blood loss anemia and thrombocytopenia  - Hgb and plt stable, no signs or symptoms of active bleeding  HIT+  RUE DVT  HIT+ 7/19 S/p PLEX 07/19. US UE 07/22 positve for non-occlusive RIJ DVT, and R cephalic vein occlusive thrombus. On bival infusion  - Transition to fondaparinux today (7/29)  - No heparin products  - unable to avoid PICC, difficulty lab draw, unable to obtain labs this am, will place PICC for temporary use over the next few days, avoid RUE given DVT    Prophylaxis:   - Stress ulcer prophylaxis: Pantoprazole 40 mg daily  - DVT prophylaxis: Bival, SCD    Disposition:   - Transferred to  on 07/24  - Rehab recommending likely discharge to home with 24 hour assist, possible discharge tomorrow      Discussed with CVTS Fellow as needed.  Staff surgeons have been informed of changes through both verbal and written communication.      Donald Rand PA-C  Cardiothoracic Surgery  p: [phone]          Interval History:   No acute complaints. Pain is well controlled. Reports breathing is okay, denies any shortness of breath. Appetite is okay. +BM today. Denies any fevers, chills, sweats, lightheadedness, dizziness.          Physical Exam:   Blood pressure (!) 151/65, pulse 91,  "temperature 98.5  F (36.9  C), temperature source Oral, resp. rate 18, height 1.626 m (5' 4\"), weight 83.4 kg (183 lb 14.4 oz), SpO2 97 %.  Vitals:    07/27/20 0400 07/28/20 0400 07/29/20 0520   Weight: 83 kg (183 lb) 82.4 kg (181 lb 11.2 oz) 83.4 kg (183 lb 14.4 oz)      Gen: NAD, resting in bed  CV: tachycardic, regular, S1S2 normal, no murmurs, rubs, or gallops.  Pulm: diminished bases, no rhonchi or wheezes  Abd: soft, non-tender, no guarding, +BS  Ext: 1+ bilateral lower extremity edema  Incision: sternal incision clean, dry, intact, no erythema; left ICD pocket clean, dry, intact, no erythema or drainage, right subclavian IABP site incision clean, dry, intact, no eythema or drainage  Chest Tube sites: dressings clean and dry  Neuro: grossly normal  Psych: calm, cooperative            Data:    Imaging:  reviewed recent imaging  Recent Results (from the past 24 hour(s))   XR Chest Port 1 View    Narrative    EXAMINATION:  XR CHEST PORT 1 VW 7/28/2020 11:55 AM.    COMPARISON: 7/27/2020.    HISTORY:  PICC placement    FINDINGS: Portable AP view of the chest. Postsurgical changes of the  chest with intact median sternotomy wires, mediastinal surgical clips  and abandoned pacer leads projecting over the cardiac silhouette.  Interval placement of left PICC with tip projecting over the mid SVC.    Midline trachea. Cardiomediastinal silhouette is stable. Pulmonary  vasculature is distinct. Mild bibasilar linear opacities. No pleural  effusion or pneumothorax. The upper abdomen is unremarkable.      Impression    IMPRESSION:   1. Left PICC tip projects over the mid SVC.  2. Mild left greater than right basilar opacities likely may represent  atelectasis versus infection.    I have personally reviewed the examination and initial interpretation  and I agree with the findings.    JAYLYN PATTERSON MD         Labs:  BMP  Recent Labs   Lab 07/29/20  0655 07/28/20  1147 07/27/20  0606 07/26/20  0545    137 141 140 "   POTASSIUM 4.9 4.6 4.6 4.7   CHLORIDE 110* 110* 109 109   BRYANNA 7.8* 7.8* 7.8* 8.0*   CO2 23 24 25 26   BUN 35* 31* 32* 37*   CR 1.33* 1.21 1.31* 1.33*   * 142* 179* 166*     CBC  Recent Labs   Lab 07/29/20  0655 07/28/20  1147 07/27/20  0606 07/26/20  0545   WBC 8.0 9.2 9.1 9.2   RBC 2.51* 2.61* 2.52* 2.68*   HGB 7.5* 7.9* 7.5* 8.2*   HCT 24.4* 25.7* 24.7* 25.8*   MCV 97 99 98 96   MCH 29.9 30.3 29.8 30.6   MCHC 30.7* 30.7* 30.4* 31.8   RDW 17.1* 17.1* 16.6* 16.2*    308 267 216     INR  No lab results found in last 7 days.   Liver Function Studies -   Recent Labs   Lab Test 07/22/20  0414   PROTTOTAL 5.5*   ALBUMIN 2.4*   BILITOTAL 0.4   ALKPHOS 75   AST 28   ALT 25     GLUCOSE:   Recent Labs   Lab 07/29/20  0655 07/29/20  0156 07/28/20  2229 07/28/20  2030 07/28/20  1749 07/28/20  1221 07/28/20  1147 07/28/20  0820  07/27/20  0606  07/26/20  0545  07/25/20  0541  07/24/20  1631   *  --   --   --   --   --  142*  --   --  179*  --  166*  --  245*  --  250*   BGM  --  186* 168* 221* 167* 144*  --  97   < >  --    < >  --    < >  --    < >  --     < > = values in this interval not displayed.

## 2020-07-29 NOTE — PROGRESS NOTES
[facility]   Cardiology II Service / Advanced Heart Failure  Progress note    Lucian Moirllo  : 1955  MRN # [id number]    ADMIT DATE: 7/3/2020    Changes today  - increase tacro to 1.5  mg PO bid and tomorrow 2 mg PO bid and check trough tomorrow, patient can be discharged after tacrolimus level is detectable  - would recommend increasing lasix to 40 mg PO daily    - hydralazine for afterload reduction   -  fondaparinux started (7.5 mg sub q )  - PM pulled out      ASSESSMENT & PLAN:  Lucian Henderson Sr. is a 64 year old male with a PMH of HTN, DMII, obesity, CKD, CHANTEL, ischemic cardiomyopathy and severe LV systolic dysfunction s/p 3V CABG () and primary prevention ICD ( 4/2/15). Currently he presents with 3 days of worsening fatigue and SOB with minimal exertion. Patient claims he could walk 1-2 blocks last week but has been barely able to ambulate home recently.     Admitted for possible LVAD/Transplant work up. IABP and listed status 2 for heart transplant . HIT + , underwent PLEX, s/p . Post operative course complicated by graft dysfunction and VT. Graft function back to near normal , LVEF 50-55%.  Normal on      # ICM s/p 3V CABG () and primary prevention ICD ( 4/2/15)  # HFrEF Stage D, NYHA III-IV s/p OHT   # Ambulatory cardiogenic shock  # Arrhythmia: HF associated VT/VF, VT (, post op from OHT)   # Graft dysfunction (EF 20-25%)   Graft Function: TTE POD#1 w/ LVEF 20-25%, RV severely reduced, suspect 2/2 to ischemic time, minimal change in LV fxn , graft function recovered   --Volume: euvolemia, lasix 20 mg PO today   --BP: MAP goal >65 , hydralazine started today   --HR: Goal >100,   -- continue with terbutaline 10 mg PO Q6H   CAV prophylaxis: ASA, will start pravastatin in coming days     Pressors/Inotropes:  -none     Immunosuppression:  -s/p Simulect on POD#0, second dose POD#4 ()  -MMF 1500 mg BID  -s/p  "solumedrol 125 mg q8h  --> transitioned to prednisone  w/ taper   - Tacro started on 7/26: 0.5 mg PO bid increased to 1 mg PO bid today   -routine endomyocardial bx 7 days post transplant (7/27) - RA 9 PA 25 PCW 15 CO 6.2      Antibiotics:   - vancomycin (will get 3 days)  - cefepime -> ceftriaxone 7/21  - Nystatin Swish&Swallow  -  bactrim started single strength daily   - CMV: R+ / D+, start valcyte 450 mg qday 7/24  - EBV: R+ / D+  - f/u blood cx, NGTD   - donor blood cx growing strep salivarius in 1/2 bottles  -->transplant ID consult 7/21, rec narrow to ceftriaxone     # Atrial Flutter with RVR  Patient went into Atrial flutter with RVR with rates around 150 overnight on 7/7/2020 at around 2 am. Converted to NSR with amiodarone, which was stopped 7/19 given OHT.     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 Arvin Ford     ..........................................................................................................................................................  Subjective:  SARITA overnight. No arrhythmias. Extubated. Denies any SOB, n/v or abdominal pain.     PHYSICAL EXAM:  /50   Pulse 91   Temp 98.5  F (36.9  C) (Oral)   Resp 18   Ht 1.626 m (5' 4\")   Wt 83.4 kg (183 lb 14.4 oz)   SpO2 98%   BMI 31.57 kg/m    GENERAL: NAD  NECK: Supple and without lymphadenopathy. JVP unable to assess   CV: S1/S2 heard without murmur   RESPIRATORY: CTAB  GI: Soft and distended. No tenderness, rebound, guarding. No palpable organomegaly.   EXTREMITIES: Peripheral edema trace.   NEUROLOGIC: Follows commands, moves all four extremities   MUSCULOSKELETAL: No joint swelling or tenderness.   SKIN: No jaundice. No acute rashes or lesions.     ROS:   12-pt ROS otherwise negative except as noted above    PMH:  Past Medical History: "   Diagnosis Date     CAD (coronary artery disease)      CHF (congestive heart failure) (H)      CKD (chronic kidney disease), stage III (H)      Cortical cataract of both eyes      Diabetes (H)      Hyperlipidemia      Hypertension      Ischemic cardiomyopathy      Obesity      CHANTEL (obstructive sleep apnea)     occas cpap     Osteoarthritis        PSH:  Past Surgical History:   Procedure Laterality Date     C CABG, ARTERY-VEIN, THREE  02/2008     CATARACT IOL, RT/LT       COLONOSCOPY N/A 8/7/2019    Procedure: COLONOSCOPY, WITH POLYPECTOMY AND BIOPSY;  Surgeon: Chauncey Morataya MD;  [facility]     CV ANGIOGRAM CORONARY GRAFT N/A 7/2/2020    Procedure: Angiogram Coronary Graft;  Surgeon: Alex Lantigua MD;  [facility]     CV CORONARY ANGIOGRAM N/A 7/2/2020    Procedure: CV CORONARY ANGIOGRAM;  Surgeon: Alex Lantigua MD;  [facility]     CV HEART BIOPSY N/A 7/27/2020    Procedure: Heart Biopsy;  Surgeon: Mario Burr MD;  [facility]     CV INTRA-AORTIC BALLOON PUMP INSERTION N/A 7/14/2020    Procedure: RHC WITH LEAVE IN SWAN/IABP;  Surgeon: Sonny Saleh MD;  [facility]     CV RIGHT HEART CATH N/A 3/25/2019    Procedure: CV RIGHT HEART CATH;  Surgeon: Moises Santos MD;  [facility]     CV RIGHT HEART CATH N/A 7/10/2019    Procedure: CV RIGHT HEART CATH;  Surgeon: Jak Mccabe MD;  [facility]     CV RIGHT HEART CATH N/A 7/8/2020    Procedure: Right Heart Cath with Leave In swan already has ICU load;  Surgeon: Jak Mccabe MD;  [facility]     CV RIGHT HEART CATH N/A 7/14/2020    Procedure: CV RIGHT HEART CATH;  Surgeon: Sonny Saleh MD;  Location:  HEART CARDIAC CATH LAB     CV RIGHT HEART CATH N/A 7/2/2020    Procedure: CV RIGHT HEART CATH;  Surgeon: Alex Lantigua MD;  Location:   HEART CARDIAC CATH LAB     CV RIGHT HEART CATH N/A 7/27/2020    Procedure: Right Heart Cath;  Surgeon: Mario Burr MD;  Location:  HEART CARDIAC CATH LAB     EXTRACTION(S) DENTAL Left 7/13/2020    Procedure: EXTRACTION, TOOTH #11, 12, 13, 15, and 29;  Surgeon: Monica Chao DDS;  Location: [facility]     IMPLANT AUTOMATIC IMPLANTABLE CARDIOVERTER DEFIBRILLATOR       INSERT INTRAAORTIC BALLOON PUMP N/A 7/16/2020    Procedure: Subclavian Intra-Aortic Balloon Pump Placement;  Surgeon: Allan Sparrow MD;  [facility]     PHACOEMULSIFICATION CLEAR CORNEA WITH STANDARD IOL, VITRECTOMY PARSPLANA 25 GAGUE, COMBINED Left 12/10/2019    Procedure: PHACOEMULSIFICATION, CATARACT, CLEAR CORNEAL INCISION APPROACH, W STD INTRAOCULAR LENS IMPLANT INSERT + VITRECTOMY BY PARS PLANA  USING 25-GAUGE INSTRUMENTS. ENDOLASER, INFUSION OF 20% SF6 GAS;  Surgeon: Triny Bunch MD;  [facility]     PICC DOUBLE LUMEN PLACEMENT Left 07/28/2020    5Fr - 42cm, Basilic vein, mid SVC     PICC INSERTION Right 07/11/2020    basilic 44 cm total      TRANSPLANT HEART RECIPIENT N/A 7/19/2020    Procedure: REDO MEDIAN STERNOTOMY, TRANSPLANT, ORTHOTOPIC HEART, RECIPIENT, ON PUMP OXYGENATOR, REMOVAL OF CARDIAC DEFIBRILLATOR AND LEAD;  Surgeon: Griselli, Massimo, MD;  Location:  OR       MEDICATIONS:  Prior to Admission Medications   Prescriptions Last Dose Informant Patient Reported? Taking?   aspirin 81 MG tablet  Self No No   Sig: Take 1 tablet (81 mg) by mouth daily   atorvastatin (LIPITOR) 40 MG tablet  Self No No   Sig: Take 1 tablet (40 mg) by mouth daily   blood glucose (ACCU-CHEK SMARTVIEW) test strip  Self No No   Sig: USE TO TEST THREE TIMES DAILY AS DIRECTED   blood glucose (NO BRAND SPECIFIED) test strip  Self No No   Sig: Use to test blood sugar 2 times daily or as directed.   blood glucose monitoring (ACCU-CHEK FELIPE SMARTVIEW) meter device kit  Self No No   Sig: Use to test blood sugar 3-4 times daily, as  directed.   blood glucose monitoring (ONE TOUCH DELICA) lancets  Self No No   Sig: Use to test blood sugars 2 times daily.   clopidogrel (PLAVIX) 75 MG tablet  Self No No   Sig: Take 1 tablet (75 mg) by mouth daily   exenatide ER (BYDUREON) 2 MG pen  Self No No   Sig: Inject 2 mg Subcutaneous every 7 days   furosemide (LASIX) 20 MG tablet  Self No No   Sig: Take 2 tablets (40 mg) by mouth 2 times daily   glimepiride (AMARYL) 4 MG tablet  Self No No   Sig: Take 1 tablet (4 mg) by mouth every morning (before breakfast)   hydrALAZINE (APRESOLINE) 25 MG tablet  Self No No   Sig: Take 1 tablet (25 mg) by mouth 3 times daily   insulin glargine (LANTUS SOLOSTAR) 100 UNIT/ML pen  Self No No   Sig: Take 8 units in the morning and 40 units in the evening   insulin pen needle (B-D U/F) 31G X 5 MM miscellaneous  Self No No   Si Units by Device route 2 times daily Use 2 pen needles daily or as directed.   isosorbide mononitrate (IMDUR) 60 MG 24 hr tablet  Self No No   Sig: Take 1 tablet (60 mg) by mouth daily   losartan (COZAAR) 50 MG tablet  Spouse/Significant Other No No   Sig: Take 1 tablet (50 mg) by mouth daily   nitroglycerin (NITROSTAT) 0.4 MG SL tablet  Self No No   Sig: Place 1 tablet (0.4 mg) under the tongue every 5 minutes as needed for chest pain If you are still having symptoms after 3 doses (15 minutes) call 911.   order for DME  Self No No   Sig: Auto CPAP 8-15 cmH20      Facility-Administered Medications Last Administration Doses Remaining   erythromycin (ROMYCIN) ophthalmic ointment None recorded    lidocaine (PF) (XYLOCAINE) 2 % injection 10 mL None recorded 1           ALLERGIES:     Allergies   Allergen Reactions     Heparin Heparin Induced Thrombocytopenia     HIT screen sent 2020. CORRINE confirmed positive     No Known Drug Allergies        FAMILY HISTORY:  Family History   Problem Relation Age of Onset     Diabetes Brother      Diabetes Sister      Diabetes Sister      Macular Degeneration No family  hx of      Glaucoma No family hx of      Myocardial Infarction No family hx of      Kidney Disease No family hx of        SOCIAL HISTORY:  Social History     Socioeconomic History     Marital status:      Spouse name: Not on file     Number of children: 4     Years of education: Not on file     Highest education level: Not on file   Occupational History     Occupation:      Employer: [employer]     Employer: RETIRED   Social Needs     Financial resource strain: Not on file     Food insecurity     Worry: Not on file     Inability: Not on file     Transportation needs     Medical: Not on file     Non-medical: Not on file   Tobacco Use     Smoking status: Never Smoker     Smokeless tobacco: Never Used     Tobacco comment: Never smoked; non-smoking household   Substance and Sexual Activity     Alcohol use: No     Alcohol/week: 0.0 standard drinks     Drug use: No     Sexual activity: Yes     Partners: Female   Lifestyle     Physical activity     Days per week: Not on file     Minutes per session: Not on file     Stress: Not on file   Relationships     Social connections     Talks on phone: Not on file     Gets together: Not on file     Attends Congregation service: Not on file     Active member of club or organization: Not on file     Attends meetings of clubs or organizations: Not on file     Relationship status: Not on file     Intimate partner violence     Fear of current or ex partner: Not on file     Emotionally abused: Not on file     Physically abused: Not on file     Forced sexual activity: Not on file   Other Topics Concern     Parent/sibling w/ CABG, MI or angioplasty before 65F 55M? No   Social History Narrative     Not on file       LABS:  BMP  Recent Labs   Lab 07/29/20  0655 07/28/20  1147 07/27/20  0606 07/26/20  0545  07/24/20  1241  07/23/20  2109  07/23/20  0411    137 141 140   < > 141   < > 137   < > 137   POTASSIUM 4.9 4.6 4.6 4.7   < > 3.6   < > 4.3   < > 4.8   CHLORIDE  110* 110* 109 109   < > 111*   < > 106   < > 107   CO2 23 24 25 26   < > 24   < > 28   < > 27   BUN 35* 31* 32* 37*   < > 34*   < > 41*   < > 39*   CR 1.33* 1.21 1.31* 1.33*   < > 1.29*   < > 1.31*   < > 1.32*   * 142* 179* 166*   < > 108*   < > 145*   < > 103*   BRYANNA 7.8* 7.8* 7.8* 8.0*   < > 6.9*   < > 8.1*   < > 8.3*   PHOS  --   --   --  2.4*  --  2.5  --  2.4*  --  2.7    < > = values in this interval not displayed.     CBC  Recent Labs   Lab 07/29/20  0655 07/28/20  1147 07/27/20  0606 07/26/20  0545   WBC 8.0 9.2 9.1 9.2   HGB 7.5* 7.9* 7.5* 8.2*   HCT 24.4* 25.7* 24.7* 25.8*   MCV 97 99 98 96    308 267 216   LYMPH  --   --   --  1.2     INR  Recent Labs   Lab 07/29/20  1442 07/29/20  0655 07/28/20  1147 07/27/20  2142   PTT 48* 35 47* 47*     IMAGING:    RHC 6/18/20     RA 20/26/18  RV 85/28  PA 82/45/58  PCWP 45  Cardiac output by Jacy: 3.85 L/min (indexed to 2.09 L/min/m2)  Cardiac output by thermodilution: 3.63 L/min (indexed to 1.97 L/min/m2)  SVR (by TD CO): 1123  PVR (by TD CO): 7.4    Angiogram 6/18/20   3 vessel CAD s/p 3V CABG in 2008 (LIMA-LAD, SVG-OM1, SVG-RPDA)  2. Known proximal SVG-RPDA occlusion  3. Presumed occlusion of SVG-OM1 (unable to locate on non-selective aortic root shot)  4. Patent LIMA-LAD with collateralization of LAD to PDA    Echocardiogram ( 3/11/2019)   Moderate to severe left ventricular dilation is present.  Severely (EF 10-20%) reduced left ventricular function is present.  No significant valve dysfunction.  Compared to study done 6/5/17 there is no significant change in LV size and  systolic function    Echocardiogram ( 7/5/2020)   Interpretation Summary  Severely (EF 10-20%) reduced left ventricular function is present. LVEF 15%  based on biplane 2D tracing. Severe left ventricular dilation is present.  LVIDd:6.8 cm. Grade III or advanced diastolic dysfunction.  The right ventricle is normal size.  Global right ventricular function is moderately reduced.  No  significant valvular abnormalities were noted.  IVC diameter and respiratory changes fall into an intermediate range  suggesting an RA pressure of 8 mmHg.  Mild pulmonary hypertension is present.     This study was compared with the study from 3/25/2019. RV function has  worsened, LV size and function appear similar.  Devices  ICD ([employer]- 4/2/2015 )

## 2020-07-29 NOTE — PROGRESS NOTES
CLINICAL NUTRITION SERVICES    Reason for Assessment:  Nutrition education regarding request from Endo team for consistent carbohydrate nutrition education.    Diet History:  Pt received DM nutrition education in 2015. Has received low-sodium nutrition education as well.     Discussed nutrition over the phone with Slovenian-speaking , pt, and wife today (7/29). Has not received consistent carbohydrate nutrition education recently. Pt with an awareness of carbohydrates PTA. Pt's wife was surprised to hear winter squash contains carbohydrate.     Diet Recall per RD 7/9/19  Breakfast Coffee with milk and 2 pieces of toast with PBJ or oatmeal    Lunch 1 egg with beans and 1-2 tortillas or soup with veggies and chicken or beef or seafood (soup made with water + Mrs Dash)   Dinner Coffee with milk, slice of bread, fruit    Snacks Pistachios or unsalted nuts   Beverages Plain water, hot tea    Alcohol None    Dining out Rare; only if daughter brings food to them (mostly salad if anything)     Per discussion with pt and his wife today (7/29/20) over the phone, he will have about a teaspoon of honey in his coffee. Drinks primarily water and avoids drinking juice. States he will eat eggs for breakfast and would have fruit and milk at home. Pt's wife states in general, breakfast and supper are his smallest meals with lunch being his largest meal. Lunch and breakfast are as listed above. He may have rice at lunch. Supper is similar to above, although he may have fruit for snacks instead.     Nutrition Diagnosis:  Food- and nutrition-related knowledge deficit r/t knowledge limitations of consistent carbohydrate diet AEB no recent formal nutrition education previously on consistent carbohydrate diet.      Interventions:  Nutrition Education: Provided verbal nutrition education on diabetes meal planning and importance of consistent carbohydrate intake to optimize glycemic control. Reviewed foods that contain carbohydrate  and those that contain very little/no carbohydrate. Discussed portion sizes and gave some examples of food choices that have the same amount of carbohydrate. Reviewed food choices of items he frequently eats. Recommended he consume 60 g carbohydrate at meals and rec meals be similar in carbohydrate content. Gave recommendation to ensure pt has carbohydrates especially at breakfast and lunch, and provided examples of items to include. Recommenced protein when eating fruit for a snack, and discussed portion size of fruit for snacks. Included Type 2 Diabetes Nutrition Therapy handout in [employer] of [place] in pt's discharge instructions. Pt and wife are aware. Answered questions regarding consistent carbohydrate diet, food safety, and protein sources. Pt and wife with a fair understanding of nutrition education and were receptive to nutrition education.     Goals:   1. Patient will verbalize two food choices that contain very little/no carbohydrate.  2. Patient will verbalize the importance of consistent carbohydrate meal planning.       Follow-up:    Patient to ask any further nutrition-related questions before discharge. In addition, pt may request outpatient RD appointment.      Christianne Rendon, MS, RD, LD, [facility]   6C Pgr: [phone]

## 2020-07-29 NOTE — PLAN OF CARE
OT 6C  Discharge Planner OT   Patient plan for discharge: Home with A  Current status: CGA sit<>stand x 3 reps with no AD. CGA for standing g/h task ~5minutes. 3 sets of BUE calisthenics x 10 reps. Ambulated ~225 +~40ft with FWW and CGA. VSS on RA  Barriers to return to prior living situation: medical status, deconditioning  Recommendations for discharge: Per plan established by the OT, the recommendation for dc location is home with A and OP CR  Rationale for recommendations: Pt may benefit from skilled therapy to increase independence with ADLs and increase activity tolerance for safe return home with assist.       Entered by: Chauncey Rodriguez 07/29/2020 2:02 PM

## 2020-07-29 NOTE — PLAN OF CARE
D/I/A:  Pt s/p heart txp 7/19.  Pt stable overnight, PW capped as per previous note.  Pt denies pain.  Up with SBA.  Slept between cares.  Pt very pleasant and appreciative.    P:  Plan to transition to fondaparinux after PW removed today.  Anticipate discharge in a day or 2 home.  Labs this am.

## 2020-07-30 ENCOUNTER — VIRTUAL VISIT (OUTPATIENT)
Dept: INTERPRETER SERVICES | Facility: CLINIC | Age: 65
End: 2020-07-30
Payer: COMMERCIAL

## 2020-07-30 ENCOUNTER — APPOINTMENT (OUTPATIENT)
Dept: INTERPRETER SERVICES | Facility: CLINIC | Age: 65
End: 2020-07-30
Payer: COMMERCIAL

## 2020-07-30 ENCOUNTER — APPOINTMENT (OUTPATIENT)
Dept: OCCUPATIONAL THERAPY | Facility: CLINIC | Age: 65
DRG: 001 | End: 2020-07-30
Attending: INTERNAL MEDICINE
Payer: COMMERCIAL

## 2020-07-30 ENCOUNTER — TELEPHONE (OUTPATIENT)
Dept: TRANSPLANT | Facility: CLINIC | Age: 65
End: 2020-07-30

## 2020-07-30 ENCOUNTER — PRE VISIT (OUTPATIENT)
Dept: TRANSPLANT | Facility: CLINIC | Age: 65
End: 2020-07-30

## 2020-07-30 ENCOUNTER — APPOINTMENT (OUTPATIENT)
Dept: PHYSICAL THERAPY | Facility: CLINIC | Age: 65
DRG: 001 | End: 2020-07-30
Attending: INTERNAL MEDICINE
Payer: COMMERCIAL

## 2020-07-30 DIAGNOSIS — Z94.1 HEART REPLACED BY TRANSPLANT (H): Primary | ICD-10-CM

## 2020-07-30 LAB
ANION GAP SERPL CALCULATED.3IONS-SCNC: 4 MMOL/L (ref 3–14)
APTT PPP: 31 SEC (ref 22–37)
BUN SERPL-MCNC: 30 MG/DL (ref 7–30)
CALCIUM SERPL-MCNC: 7.7 MG/DL (ref 8.5–10.1)
CHLORIDE SERPL-SCNC: 112 MMOL/L (ref 94–109)
CO2 SERPL-SCNC: 24 MMOL/L (ref 20–32)
CREAT SERPL-MCNC: 1.4 MG/DL (ref 0.66–1.25)
ERYTHROCYTE [DISTWIDTH] IN BLOOD BY AUTOMATED COUNT: 17.2 % (ref 10–15)
ERYTHROCYTE [DISTWIDTH] IN BLOOD BY AUTOMATED COUNT: 17.4 % (ref 10–15)
GFR SERPL CREATININE-BSD FRML MDRD: 52 ML/MIN/{1.73_M2}
GLUCOSE BLDC GLUCOMTR-MCNC: 145 MG/DL (ref 70–99)
GLUCOSE BLDC GLUCOMTR-MCNC: 218 MG/DL (ref 70–99)
GLUCOSE BLDC GLUCOMTR-MCNC: 223 MG/DL (ref 70–99)
GLUCOSE BLDC GLUCOMTR-MCNC: 243 MG/DL (ref 70–99)
GLUCOSE SERPL-MCNC: 87 MG/DL (ref 70–99)
HCT VFR BLD AUTO: 22.4 % (ref 40–53)
HCT VFR BLD AUTO: 25.1 % (ref 40–53)
HGB BLD-MCNC: 6.9 G/DL (ref 13.3–17.7)
HGB BLD-MCNC: 7.5 G/DL (ref 13.3–17.7)
MCH RBC QN AUTO: 29.6 PG (ref 26.5–33)
MCH RBC QN AUTO: 30.4 PG (ref 26.5–33)
MCHC RBC AUTO-ENTMCNC: 29.9 G/DL (ref 31.5–36.5)
MCHC RBC AUTO-ENTMCNC: 30.8 G/DL (ref 31.5–36.5)
MCV RBC AUTO: 99 FL (ref 78–100)
MCV RBC AUTO: 99 FL (ref 78–100)
PLATELET # BLD AUTO: 309 10E9/L (ref 150–450)
PLATELET # BLD AUTO: 312 10E9/L (ref 150–450)
POTASSIUM SERPL-SCNC: 4.6 MMOL/L (ref 3.4–5.3)
RBC # BLD AUTO: 2.27 10E12/L (ref 4.4–5.9)
RBC # BLD AUTO: 2.53 10E12/L (ref 4.4–5.9)
SODIUM SERPL-SCNC: 140 MMOL/L (ref 133–144)
TACROLIMUS BLD-MCNC: <3 UG/L (ref 5–15)
TME LAST DOSE: ABNORMAL H
WBC # BLD AUTO: 11.4 10E9/L (ref 4–11)
WBC # BLD AUTO: 9.7 10E9/L (ref 4–11)

## 2020-07-30 PROCEDURE — 25000132 ZZH RX MED GY IP 250 OP 250 PS 637: Performed by: PEDIATRICS

## 2020-07-30 PROCEDURE — 25000131 ZZH RX MED GY IP 250 OP 636 PS 637: Performed by: INTERNAL MEDICINE

## 2020-07-30 PROCEDURE — 97530 THERAPEUTIC ACTIVITIES: CPT | Mod: GO

## 2020-07-30 PROCEDURE — 40000802 ZZH SITE CHECK

## 2020-07-30 PROCEDURE — 25000132 ZZH RX MED GY IP 250 OP 250 PS 637: Performed by: NURSE PRACTITIONER

## 2020-07-30 PROCEDURE — 25000132 ZZH RX MED GY IP 250 OP 250 PS 637: Performed by: INTERNAL MEDICINE

## 2020-07-30 PROCEDURE — T1013 SIGN LANG/ORAL INTERPRETER: HCPCS | Mod: U3,TEL

## 2020-07-30 PROCEDURE — 25000131 ZZH RX MED GY IP 250 OP 636 PS 637: Performed by: STUDENT IN AN ORGANIZED HEALTH CARE EDUCATION/TRAINING PROGRAM

## 2020-07-30 PROCEDURE — 00000146 ZZHCL STATISTIC GLUCOSE BY METER IP

## 2020-07-30 PROCEDURE — 97535 SELF CARE MNGMENT TRAINING: CPT | Mod: GO

## 2020-07-30 PROCEDURE — 36415 COLL VENOUS BLD VENIPUNCTURE: CPT | Performed by: STUDENT IN AN ORGANIZED HEALTH CARE EDUCATION/TRAINING PROGRAM

## 2020-07-30 PROCEDURE — 25000131 ZZH RX MED GY IP 250 OP 636 PS 637: Performed by: CLINICAL NURSE SPECIALIST

## 2020-07-30 PROCEDURE — 80048 BASIC METABOLIC PNL TOTAL CA: CPT | Performed by: THORACIC SURGERY (CARDIOTHORACIC VASCULAR SURGERY)

## 2020-07-30 PROCEDURE — 25000128 H RX IP 250 OP 636: Performed by: PHYSICIAN ASSISTANT

## 2020-07-30 PROCEDURE — 25000132 ZZH RX MED GY IP 250 OP 250 PS 637: Performed by: SURGERY

## 2020-07-30 PROCEDURE — 36415 COLL VENOUS BLD VENIPUNCTURE: CPT | Performed by: THORACIC SURGERY (CARDIOTHORACIC VASCULAR SURGERY)

## 2020-07-30 PROCEDURE — 25000132 ZZH RX MED GY IP 250 OP 250 PS 637: Performed by: STUDENT IN AN ORGANIZED HEALTH CARE EDUCATION/TRAINING PROGRAM

## 2020-07-30 PROCEDURE — 99232 SBSQ HOSP IP/OBS MODERATE 35: CPT | Performed by: NURSE PRACTITIONER

## 2020-07-30 PROCEDURE — 97116 GAIT TRAINING THERAPY: CPT | Mod: GP | Performed by: PHYSICAL THERAPIST

## 2020-07-30 PROCEDURE — 97530 THERAPEUTIC ACTIVITIES: CPT | Mod: GP | Performed by: PHYSICAL THERAPIST

## 2020-07-30 PROCEDURE — 85730 THROMBOPLASTIN TIME PARTIAL: CPT | Performed by: THORACIC SURGERY (CARDIOTHORACIC VASCULAR SURGERY)

## 2020-07-30 PROCEDURE — 25000131 ZZH RX MED GY IP 250 OP 636 PS 637: Performed by: NURSE PRACTITIONER

## 2020-07-30 PROCEDURE — 85027 COMPLETE CBC AUTOMATED: CPT | Performed by: STUDENT IN AN ORGANIZED HEALTH CARE EDUCATION/TRAINING PROGRAM

## 2020-07-30 PROCEDURE — 85027 COMPLETE CBC AUTOMATED: CPT | Performed by: THORACIC SURGERY (CARDIOTHORACIC VASCULAR SURGERY)

## 2020-07-30 PROCEDURE — 25000132 ZZH RX MED GY IP 250 OP 250 PS 637: Performed by: PHYSICIAN ASSISTANT

## 2020-07-30 PROCEDURE — 25000132 ZZH RX MED GY IP 250 OP 250 PS 637: Performed by: CLINICAL NURSE SPECIALIST

## 2020-07-30 PROCEDURE — 80197 ASSAY OF TACROLIMUS: CPT | Performed by: STUDENT IN AN ORGANIZED HEALTH CARE EDUCATION/TRAINING PROGRAM

## 2020-07-30 PROCEDURE — 21400000 ZZH R&B CCU UMMC

## 2020-07-30 RX ORDER — ROSUVASTATIN CALCIUM 10 MG/1
10 TABLET, COATED ORAL DAILY
Status: DISCONTINUED | OUTPATIENT
Start: 2020-07-30 | End: 2020-08-03 | Stop reason: HOSPADM

## 2020-07-30 RX ORDER — TACROLIMUS 1 MG/1
2 CAPSULE ORAL
Status: DISCONTINUED | OUTPATIENT
Start: 2020-07-30 | End: 2020-08-01

## 2020-07-30 RX ORDER — HYDRALAZINE HYDROCHLORIDE 20 MG/ML
10 INJECTION INTRAMUSCULAR; INTRAVENOUS EVERY 6 HOURS PRN
Status: DISCONTINUED | OUTPATIENT
Start: 2020-07-30 | End: 2020-08-03 | Stop reason: HOSPADM

## 2020-07-30 RX ORDER — LIDOCAINE 40 MG/G
CREAM TOPICAL
Status: CANCELLED | OUTPATIENT
Start: 2020-07-30

## 2020-07-30 RX ORDER — TERBUTALINE SULFATE 5 MG/1
5 TABLET ORAL EVERY 8 HOURS
Status: DISCONTINUED | OUTPATIENT
Start: 2020-07-30 | End: 2020-08-01

## 2020-07-30 RX ADMIN — Medication 25 MG: at 05:07

## 2020-07-30 RX ADMIN — MYCOPHENOLATE MOFETIL 1500 MG: 250 CAPSULE ORAL at 18:03

## 2020-07-30 RX ADMIN — MYCOPHENOLATE MOFETIL 1500 MG: 250 CAPSULE ORAL at 08:26

## 2020-07-30 RX ADMIN — VALGANCICLOVIR 450 MG: 450 TABLET, FILM COATED ORAL at 08:27

## 2020-07-30 RX ADMIN — FONDAPARINUX SODIUM 7.5 MG: 7.5 INJECTION, SOLUTION SUBCUTANEOUS at 14:44

## 2020-07-30 RX ADMIN — TAMSULOSIN HYDROCHLORIDE 0.4 MG: 0.4 CAPSULE ORAL at 08:27

## 2020-07-30 RX ADMIN — GLIMEPIRIDE 6 MG: 4 TABLET ORAL at 08:26

## 2020-07-30 RX ADMIN — TERBUTALINE SULFATE 5 MG: 5 TABLET ORAL at 12:51

## 2020-07-30 RX ADMIN — ASPIRIN 81 MG CHEWABLE TABLET 81 MG: 81 TABLET CHEWABLE at 08:27

## 2020-07-30 RX ADMIN — TERBUTALINE SULFATE 5 MG: 5 TABLET ORAL at 00:40

## 2020-07-30 RX ADMIN — TERBUTALINE SULFATE 5 MG: 5 TABLET ORAL at 20:20

## 2020-07-30 RX ADMIN — NYSTATIN 1000000 UNITS: 100000 SUSPENSION ORAL at 12:51

## 2020-07-30 RX ADMIN — TERBUTALINE SULFATE 5 MG: 5 TABLET ORAL at 05:07

## 2020-07-30 RX ADMIN — NYSTATIN 1000000 UNITS: 100000 SUSPENSION ORAL at 08:30

## 2020-07-30 RX ADMIN — PANTOPRAZOLE SODIUM 40 MG: 40 TABLET, DELAYED RELEASE ORAL at 08:27

## 2020-07-30 RX ADMIN — Medication 25 MG: at 21:54

## 2020-07-30 RX ADMIN — TACROLIMUS 2 MG: 1 CAPSULE ORAL at 18:03

## 2020-07-30 RX ADMIN — PREDNISONE 20 MG: 20 TABLET ORAL at 20:21

## 2020-07-30 RX ADMIN — TACROLIMUS 1.5 MG: 1 CAPSULE ORAL at 08:26

## 2020-07-30 RX ADMIN — NYSTATIN 1000000 UNITS: 100000 SUSPENSION ORAL at 16:40

## 2020-07-30 RX ADMIN — PREDNISONE 20 MG: 20 TABLET ORAL at 08:27

## 2020-07-30 RX ADMIN — SITAGLIPTIN 100 MG: 100 TABLET, FILM COATED ORAL at 08:27

## 2020-07-30 RX ADMIN — INSULIN HUMAN 10 UNITS: 100 INJECTION, SUSPENSION SUBCUTANEOUS at 20:30

## 2020-07-30 RX ADMIN — FUROSEMIDE 40 MG: 40 TABLET ORAL at 08:27

## 2020-07-30 RX ADMIN — ACETAMINOPHEN 975 MG: 325 TABLET, FILM COATED ORAL at 21:54

## 2020-07-30 RX ADMIN — Medication 1 TABLET: at 18:04

## 2020-07-30 RX ADMIN — Medication 25 MG: at 14:44

## 2020-07-30 RX ADMIN — Medication 1 TABLET: at 10:29

## 2020-07-30 RX ADMIN — SULFAMETHOXAZOLE AND TRIMETHOPRIM 1 TABLET: 400; 80 TABLET ORAL at 08:27

## 2020-07-30 RX ADMIN — INSULIN ASPART 6 UNITS: 100 INJECTION, SOLUTION INTRAVENOUS; SUBCUTANEOUS at 18:04

## 2020-07-30 RX ADMIN — INSULIN ASPART 8 UNITS: 100 INJECTION, SOLUTION INTRAVENOUS; SUBCUTANEOUS at 12:23

## 2020-07-30 RX ADMIN — NYSTATIN 1000000 UNITS: 100000 SUSPENSION ORAL at 20:20

## 2020-07-30 RX ADMIN — ROSUVASTATIN CALCIUM 10 MG: 10 TABLET, FILM COATED ORAL at 08:34

## 2020-07-30 ASSESSMENT — ACTIVITIES OF DAILY LIVING (ADL)
ADLS_ACUITY_SCORE: 12

## 2020-07-30 ASSESSMENT — MIFFLIN-ST. JEOR: SCORE: 1531.06

## 2020-07-30 ASSESSMENT — PAIN DESCRIPTION - DESCRIPTORS: DESCRIPTORS: ACHING

## 2020-07-30 NOTE — PLAN OF CARE
D: pt admitted for  Admitted 6/3 with heart failure symptoms, worked up for advanced therapies.  s/p orthotopic heart transplant 7/19. 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 Graft function back to near normal 7/22 (EF 50-55%) PMH of  HTN, DMII, obesity, CKD, CHANTEL, ischemic cardiomyopathy and severe LV systolic dysfunction s/p 3V CABG (2008) and primary prevention ICD ( 4/2/15).    I: Monitored vitals and assessed pt status.     Changed:   -increase Tacro 2 mg 2X/day   -increase Crestor 10 mg  -increase Lesix 40mg   -Decrease Terbutalin 5mg q8h  -start Calcium/vitamin D 2x/day    Vitals:  Temp: 97.6  F (36.4  C) Temp src: Oral BP: (!) 144/62 Pulse: 102 Heart Rate: 99 Resp: 18 SpO2: 98 % O2 Device: None (Room air)      A:   Neuro: A&O x 4. Neurologically intact, denies Headache, numbness and tingling calls appropriately   Cardiac: SR/ST; denies chest pain. Elevated BP, PRN hydralazine ordered for SBP >150   Respiratory: sating on RA; ALBRIGHT  Diet/appetite: Regular diet; 2L FR. Good appetite   Endocrine: BS check AC/HS   GI/:  BM X1, denies abdominal pain and nausea. Good urine output, voids without difficulty   Activity: SBA  Pain:  lower Back pain, decline medication;  Heat applied pt stated relieve   Skin: No new skin deficit noted.   LDAs: double lumen PICC, PIV   Labs: Hgb 6.9 this morning, recheck 7.5      P: Continue to monitor Pt status and report changes to treatment team.

## 2020-07-30 NOTE — PLAN OF CARE
D/I/A:  Pt s/p heart txp 7/19.  Pt stable overnight.  Denies pain, heat pack helped with back pain from during the day.  PICC line not drawing adequate blood back for labs-pt may need US for further draws.  Hgb 6.9 this am-text paged crosscover.  Rhythm remains ST without ectopy.     P:  Waiting for stable labs prior to discharge.  Transplant coordinator to do discharge teaching today.  Update team with changes/concerns.

## 2020-07-30 NOTE — PROGRESS NOTES
Transplant Social Work Service Discharge Note      Patient Name:  Lucian Henderson SrMerly     Anticipated Discharge Date: 7/31 or weekend     Discharge Disposition:   Other:  Pt will discharge home.    Plan for 24 hour care for immediate post transplant period: Pt's wife will provide 24hr supervision for 30 days post discharge. Pt also has supportive adult children.     If not local, plans for short term stay:  Pt lives locally    Additional Services/Equipment Arranged:  None      Persons notified of above discharge plan:  Pt and wife    Patient / Family response to discharge plan:  Pt and wife are excited to go home. They are understandably nervous but feel they have a good understanding on the medication management and know to call their post-transplant coordinator with questions. Pt and wife received pharmacy education on Tuesday and spoke to post transplant coordinator today.     There have been no concerns with coping or mental health post-transplant.     Education and resources provided by [facility] at discharge: role of transplant  in out patient setting and provided contact info for , coping with transition home, reinforced when to call their post transplant coordinator and writing to donor family    Discussed anticipated pharmacy out of pocket costs: YES    Provided [employer] Direct Donor Letter Writing packet : NO-document not available in [place].     Plan:     Anticipate when pt is medically ready to discharge that he will go home. Pt has a great support system, whom have been educated on pt's need for 24hr supervision for 30 days post-discharge and have writer and their post transplant coordinator's phone number.

## 2020-07-30 NOTE — PROGRESS NOTES
[facility]   Cardiology II Service / Advanced Heart Failure  Daily Consult Note      Patient: Lucian Henderson Sr.  MRN: [id number]  Admission Date: 7/3/2020  Hospital Day # 27    Assessment and Plan: Lucian Henderson Sr. is a 64 year old male with HTN, DMII, obesity, CKD, CHANTEL, ischemic cardiomyopathy and severe LV systolic dysfunction s/p 3V CABG (2008) and primary prevention ICD ( 4/2/15). Admitted 6/3 with heart failure symptoms, worked up for advanced therapies. Underwent IABP and listed state 2, s/p orthotopic heart transplant 7/19. 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 Graft function back to near normal 7/22 (EF 50-55%)    Recommendations:  -increase tacrolimus to 2 mg bid, recheck level in AM  -increase crestor to 10 mg per protocol  -start calcium/vit D  -decrease terbutaline 5 mg q8hr  -DSA tomorrow per protocol  -HOLD FONDAPARINUX DAY OF BIOPSIES    # Status post OHT on 7/19/20, history of ICM  # Primary graft dysfunction, resolved  # Chronic immunosuppression  * TTE 7/29/20: LVEF 60-65%, RV normal, trivial pericardial effusion, left pleural effusion  * RHC 7/27/20: RA 9 PA 39/14(25) PCWP 15 Td CO 6.3    Graft Function:   --Volume: slightly fluid up, mostly third spacing, continue lasix 40 mg daily  --BP: 120s-140s/60s on hydralazine 25 mg tid  --HR: 100s-110s, decrease terbutaline 5 mg q8hr    Immunosuppression:   --CNI-delaying protocol with basiliximab on 7/19 and 7/24  --prednisone 20 mg BID, per taper with negative biopsies  --Cellcept 1500 mg BID  --tacrolimus trough < 3 today, increase to 2 mg BID (goal 10-12), daily levels    Serostatus: CMV: D+/R+, EBV: D+/R+, Toxo: D+/R-    Prophylaxis:   --CAV: aspirin 81 mg and rosuvastatin 10 mg daily  --Thrush: Nystatin   --PCP: Bactrim single strength daily  --GI: Protonix   --Osteoporosis: calcium/vitamin D   --CMV:  Valcyte renally dosed 450 mg daily through October  --Toxo: D+/R- Bactrim lifelong  "    Routine Surveillance:   --week 1 7/27/20: negative  --DSAs POD 7  --week 2 biopsy due 8/3/20    #Donor Streptococcus salivarius bacteremia  - transplant ID consult 7/21, treated with ceftriaxone     # Atrial Flutter with RVR pre-op  # Post op arrhythmia  - decreasing terbutaline as above     # Thrombocytopenia  # HIT   - HIT antibody positive 7/19  - CORRINE positive, started bivalrudin 7/22, transitioned fondaparinux 7/29, HOLD day before biopsies     # Right internal jugular and axillary vein DVT, non occlusive   # Occlusive thrombus in superficial right cephalic vein   - defer AC due to recent transplant      Geovanna Andino, SONA, NP-C  Advanced Heart Failure/Cardiology II Service  Pager [phone] [facility] [id number]    ================================================================    Subjective/24-Hr Events:   Last 24 hr care team notes reviewed. No overnight events. No complaints today. Still with LE edema. Denies orthopnea, PND. Tolerating therapies.     ROS:  4 point ROS including respiratory, CV, GI and  (other than that noted in the HPI) is negative.     Medications: Reviewed in EPIC.     Physical Exam:   /73 (BP Location: Left arm)   Pulse 91   Temp 98.4  F (36.9  C) (Oral)   Resp 18   Ht 1.626 m (5' 4\")   Wt 84 kg (185 lb 3.2 oz)   SpO2 99%   BMI 31.79 kg/m      GENERAL: Appears comfortable, in no distress.  HEENT: Eye symmetrical, no discharge or icterus bilaterally. Mucous membranes moist and without lesions.  NECK: Supple, JVD just above clavicle at 90 degrees.   CV: Tachycardic and refular, +S1S2, no murmur, rub, or gallop.   RESPIRATORY: Respirations regular, even, and unlabored. Lungs CTA throughout.   GI: Soft, obese and non distended with normoactive bowel sounds present in all quadrants. No tenderness, rebound, guarding.   EXTREMITIES: 2-3+ BLE peripheral edema. 2+ bilateral pedal pulses.    NEUROLOGIC: Alert and oriented x 3. No focal deficits.   MUSCULOSKELETAL: No joint swelling or " tenderness.   SKIN: No jaundice. No rashes or lesions. Sternum stable.     Labs:  CMP  Recent Labs   Lab 07/30/20  0456 07/29/20  0655 07/28/20  1147 07/27/20  0606 07/26/20  0545 07/25/20  0541  07/24/20  1241 07/24/20  0411 07/23/20  2109    138 137 141 140 138   < > 141 138 137   POTASSIUM 4.6 4.9 4.6 4.6 4.7 4.4   < > 3.6 4.2 4.3   CHLORIDE 112* 110* 110* 109 109 108   < > 111* 106 106   CO2 24 23 24 25 26 24   < > 24 27 28   ANIONGAP 4 6 3 6 5 6   < > 6 4 4   GLC 87 167* 142* 179* 166* 245*   < > 108* 92 145*   BUN 30 35* 31* 32* 37* 41*   < > 34* 40* 41*   CR 1.40* 1.33* 1.21 1.31* 1.33* 1.40*   < > 1.29* 1.35* 1.31*   GFRESTIMATED 52* 55* 62 56* 55* 52*   < > 57* 54* 56*   GFRESTBLACK 60* 64 71 65 64 60*   < > 66 63 65   BRYANNA 7.7* 7.8* 7.8* 7.8* 8.0* 7.7*   < > 6.9* 8.0* 8.1*   MAG  --  2.2  --   --   --   --   --   --  2.3 2.3   PHOS  --   --   --   --  2.4*  --   --  2.5  --  2.4*   PROTTOTAL  --   --   --   --   --  5.4*  --   --   --   --    ALBUMIN  --   --   --   --   --  2.2*  --   --   --   --    BILITOTAL  --   --   --   --   --  0.4  --   --   --   --    ALKPHOS  --   --   --   --   --  82  --   --   --   --    AST  --   --   --   --   --  14  --   --   --   --    ALT  --   --   --   --   --  21  --   --   --   --     < > = values in this interval not displayed.       CBC  Recent Labs   Lab 07/30/20  0456 07/29/20  0655 07/28/20  1147 07/27/20  0606   WBC 9.7 8.0 9.2 9.1   RBC 2.27* 2.51* 2.61* 2.52*   HGB 6.9* 7.5* 7.9* 7.5*   HCT 22.4* 24.4* 25.7* 24.7*   MCV 99 97 99 98   MCH 30.4 29.9 30.3 29.8   MCHC 30.8* 30.7* 30.7* 30.4*   RDW 17.2* 17.1* 17.1* 16.6*    320 308 267     Time/Communication  I personally spent a total of 25 minutes. Of that 15 minutes was counseling/coordination of patient's care. Plan of care discussed with patient. See my note above for details.    Patient discussed with Dr. Sheridan.

## 2020-07-30 NOTE — PROGRESS NOTES
Diabetes Consult Daily  Progress Note          Assessment/Plan:     Lucian Henderson Sr. is a 64 year old male with a PMH of end-stage ischemic cardiomyopathy, CAD s/p CABG 2008, DMT2, who was admitted to [facility] 07/03 for heart failure exacerbation with cardiogenic shock, underwent right subclavian IABP insertion 07/16 with Dr. Sparrow, and then heart transplant 07/20 with Dr. Griselli. 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      BG control largely to target until yesterday evening. Increased carb intake and tacrolimus may explain the hyperglycemia.    Plan    - increase NPH from 27 to 33 units qAM starting tomorrow (today given 27 in AM and 7 units after lunch) and decrease from 15 to 10 units qPM  (prednisone 20 mg BID today)  - continue glimepiride 6 mg qAM  - sitagliptin 100 mg qAM  - continue aspart correction change to 2 per 30 mg/dL (for inpatient use)  - nutrition consult and diab educ consult in prep for discharge Friday  - test claim for NPH =  $8.95/month       Outpatient diabetes follow up: Recommend video visit 2-7 days p discharge.  Msg p clin coord HIGH urgency to schedule  Plan discussed with patient, wife, bedside RN, primary team and diabetes educator    Scripts needed on discharge include: 1. Januvia 2. Humulin N pen devices, Possibly supplies, possibly glimepiride.        Tentative home diabetes plan pasted into AVS, will need revision day of discharge to reflect interval changes.           Interval History:     The last 24 hours progress and nursing notes reviewed.      Yesterday bfast carbs= 87 grams.    Lunch= 41 grams (82 grams on tray, but shared meal with wife)  Supper = 100 grams       Today, had fewer carbs at bfast, but noon BG still 200+.  Lunch tray today with 58.5 grams and pt planned to share with his wife.    At home, eats the most at lunch (1400).  At home, plans to be more active.  Already moving more today, since  "back pain better.  RD recommended 3-5 carb choices per meal.  Recommended he aim for 60 grams (4 carb choices)  Tentative discharge plan given to pt/wife to review on 7/29.    Yesterday pacer wires out, diuretic increased, tacrolimus increased, switched to fondaparinux  CVTS note says home Friday , awaiting detectable tacro level.  Cards says it might not be until the weekend.    Watching GFR. . . Lower second day in a row.      Recent Labs   Lab 07/30/20  0456 07/30/20  0154 07/29/20  2137 07/29/20  2022 07/29/20  1712 07/29/20  0655 07/29/20  0156 07/28/20  2229  07/28/20  1147  07/27/20  0606  07/26/20  0545  07/25/20  0541   GLC 87  --   --   --   --  167*  --   --   --  142*  --  179*  --  166*  --  245*   BGM  --  145* 252* 309* 332*  --  186* 168*   < >  --    < >  --    < >  --    < >  --     < > = values in this interval not displayed.           Nutrition:     Orders Placed This Encounter      Regular Diet Adult            PTA Regimen:     Lantus 40 units qpm, 8 qam.  Januvia 50 mg qam  Amaryl 4 mg  Reports no salt, no sugar, fasting BG 85 - 110.     7/26 \"I reached wife and she advised WAS taking 48 units daily Lantus, together with Januvia and Amaryl PTA.  He had tried substituting Bydureon for Januvia but had loose stools, bloating abdominal pain and did not like it.  He stopped and resumed Januvia after perhaps one month.          Review of Systems:   See interval hx          Medications:   to 20 mg BID AM 7/28         Physical Exam:     Gen: Alert, in NAD, in chair  wife at bedside and supportive  HEENT: NC/AT, Eyes clear, Moist oral mucous membranes  Resp: Unlabored at rest  Ext: mild LE edema  Neuro: oriented x3, communicating clearly.   Psych: calm, easily engaged  BP (!) 144/73 (BP Location: Left arm)   Pulse 91   Temp 99  F (37.2  C) (Oral)   Resp 18   Ht 1.626 m (5' 4\")   Wt 84 kg (185 lb 3.2 oz)   SpO2 99%   BMI 31.79 kg/m               Data:     Lab Results   Component Value Date    A1C " 8.2 07/03/2020    A1C 7.9 07/08/2019    A1C 8.5 03/11/2019    A1C 7.6 10/16/2018    A1C 9.8 09/06/2017          Recent Labs   Lab Test 07/30/20  0456 07/29/20  0655    138   POTASSIUM 4.6 4.9   CHLORIDE 112* 110*   CO2 24 23   ANIONGAP 4 6   GLC 87 167*   BUN 30 35*   CR 1.40* 1.33*   BRYANNA 7.7* 7.8*     CBC RESULTS:   Recent Labs   Lab Test 07/30/20  0456   WBC 9.7   RBC 2.27*   HGB 6.9*   HCT 22.4*   MCV 99   MCH 30.4   MCHC 30.8*   RDW 17.2*               GFR Estimate   Date Value Ref Range Status   07/30/2020 52 (L) >60 mL/min/[1.73_m2] Final     Comment:     Non  GFR Calc  Starting 12/18/2018, serum creatinine based estimated GFR (eGFR) will be   calculated using the Chronic Kidney Disease Epidemiology Collaboration   (CKD-EPI) equation.     07/29/2020 55 (L) >60 mL/min/[1.73_m2] Final     Comment:     Non  GFR Calc  Starting 12/18/2018, serum creatinine based estimated GFR (eGFR) will be   calculated using the Chronic Kidney Disease Epidemiology Collaboration   (CKD-EPI) equation.     07/28/2020 62 >60 mL/min/[1.73_m2] Final     Comment:     Non  GFR Calc  Starting 12/18/2018, serum creatinine based estimated GFR (eGFR) will be   calculated using the Chronic Kidney Disease Epidemiology Collaboration   (CKD-EPI) equation.       GFR Estimate If Black   Date Value Ref Range Status   07/30/2020 60 (L) >60 mL/min/[1.73_m2] Final     Comment:      GFR Calc  Starting 12/18/2018, serum creatinine based estimated GFR (eGFR) will be   calculated using the Chronic Kidney Disease Epidemiology Collaboration   (CKD-EPI) equation.     07/29/2020 64 >60 mL/min/[1.73_m2] Final     Comment:      GFR Calc  Starting 12/18/2018, serum creatinine based estimated GFR (eGFR) will be   calculated using the Chronic Kidney Disease Epidemiology Collaboration   (CKD-EPI) equation.     07/28/2020 71 >60 mL/min/[1.73_m2] Final     Comment:       American GFR Calc  Starting 12/18/2018, serum creatinine based estimated GFR (eGFR) will be   calculated using the Chronic Kidney Disease Epidemiology Collaboration   (CKD-EPI) equation.         I spent a total of 35 minutes bedside and on the inpatient unit managing the glycemic care of Lucian Henderson Sr.. Over 50% of my time on the unit was spent counseling the patient and wife and/or coordinating care regarding BG management w/ steroids, carbohydrate intake plan for home.  See note for details.    Liyah Damon APRN [facility] [phone]  Diabetes Management Team job code: [id number]

## 2020-07-30 NOTE — PROGRESS NOTES
Cardiovascular Surgery Progress Note  07/30/2020         Assessment and Plan:     Lucian Henderson Sr. is a 64 year old male with a PMH of end-stage ischemic cardiomyopathy, CAD s/p CABG 2008, DMT2, who was admitted to [facility] 07/03 for heart failure exacerbation with cardiogenic shock, underwent right subclavian IABP insertion 07/16 with Dr. Sparrow, and then heart transplant 07/20 with Dr. Griselli. 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     Cardiovascular:   S/p heart transplant  Primary graft dysfunction, improved  TTE 07/20 with EF 20-25%, severe RV dysfunction, suspected due to ischemic time. TTE 07/21 with EF improved to 50-55%. CI robust off pressors/inotrope 07/24. Repeat Echo 7/27 with normal LVEF  - SBP 140s-120s, improved today. Hydralazine mg TID on 7/27, titrate as able  - Appears hypervolemic with LE edema, RHC on 7/27 with normal filling pressures, start lasix 40 mg po daily per cards  - Chest tubes: removed 7/26, f/u CXR stable  - Removed TPW 7/29, HR NSR 90s-100. Decreased Terbutaline to 5 mg q6H. Wean per cards 2  Immunosuppression per Cards 2  - Simulect induction  - MMF 1500 mg BID  - Tacrolimus start 07/26  - Prednisone taper per cardiology  Serologies/Prophylaxis per Cards 2  - CMV: R+ / D+  - EBV: R+ / D+  - Valcyte 450 mg daily 7/24  - Bacrtim 07/26  - Nystatin s/s   - Aspirin 81 mg daily   - Consider starting statin (LFTs 7/25 WNL)   Pericardial effusion  Found incidentally on echo 7/27, 1.7 cm. HDS   - Repeat echo today (7/29) with decreased size in pericardial effusion    Pulmonary:  Postop mechanical ventilation, resolved  CHANTEL  - Extubated POD 2; Saturating well on RA.   - Supplemental O2 PRN to keep sats > 92%. Wean off as tolerated.  - Pulm toilet, IS, activity and deep breathing  - Resume CPAP at night, patient requests to use hospital CPAP    Neurology / MSK:  Postop pain  - Tylenol scheduled  - Oxycodone PRN  - Dilaudid IV PRN  - Robaxin  PRN     / Renal:  CKD 3  - Cr stable, trend daily  - Avoid nephrotoxins  - Diuresis as above  Urinary retention  - Tamsulosin 0.4 mg daily, voiding without issues    GI / FEN:   Nutrition  - Diabetic diet, continue bowel regimen    Endocrine:  DMT2  Stress/medication induced induced hyperglycemia  - Consult to endocrine for blood sugar management, appreciate assistance    Infectious Disease:  Leukocytosis, resolved  Donor Streptococcus salivarius bacteremia  - WBC WNL, afebrile, no signs or symptoms of infection  - ID consulted. Completed perioperative antibiotics. Completed 7 day course of Ceftriaxone 2g q24H for donor bacteremia (ended 7/25)  - Blood culture 7/21 NG    Hematology:   Acute postop blood loss anemia and thrombocytopenia  - Hgb 6.9 this am, recheck this am, no signs or symptoms of active bleeding, transfuse if hgb less than 7  HIT+  RUE DVT  HIT+ 7/19 S/p PLEX 07/19. US UE 07/22 positve for non-occlusive RIJ DVT, and R cephalic vein occlusive thrombus. On bival infusion  - Transitioned to fondaparinux today (7/29)  - No heparin products  - unable to avoid PICC, difficulty lab draw, unable to obtain labs this am, will place PICC for temporary use over the next few days, avoid RUE given DVT    Prophylaxis:   - Stress ulcer prophylaxis: Pantoprazole 40 mg daily  - DVT prophylaxis: Bival, SCD    Disposition:   - Transferred to  on 07/24  - Rehab recommending likely discharge to home with 24 hour assist, possible discharge tomorrow      Discussed with CVTS Fellow as needed.  Staff surgeons have been informed of changes through both verbal and written communication.      Donald Rand PA-C  Cardiothoracic Surgery  p: [phone]          Interval History:   No acute complaints. Pain is well controlled. Reports breathing is okay, denies any shortness of breath. Appetite is okay. +BM today. Denies any fevers, chills, sweats, lightheadedness, dizziness.          Physical Exam:   Blood pressure 138/73,  "pulse 91, temperature 98.4  F (36.9  C), temperature source Oral, resp. rate 18, height 1.626 m (5' 4\"), weight 84 kg (185 lb 3.2 oz), SpO2 98 %.  Vitals:    20 0400 20 0520 20 0500   Weight: 82.4 kg (181 lb 11.2 oz) 83.4 kg (183 lb 14.4 oz) 84 kg (185 lb 3.2 oz)      Gen: NAD, resting in bed  CV: tachycardic, regular, S1S2 normal, no murmurs, rubs, or gallops.  Pulm: diminished bases, no rhonchi or wheezes  Abd: soft, non-tender, no guarding, +BS  Ext: 2+ bilateral lower extremity edema  Incision: sternal incision clean, dry, intact, no erythema; left ICD pocket clean, dry, intact, no erythema or drainage, right subclavian IABP site incision clean, dry, intact, no eythema or drainage  Chest Tube sites: dressings clean and dry  Neuro: grossly normal  Psych: calm, cooperative            Data:    Imaging:  reviewed recent imaging  Recent Results (from the past 24 hour(s))   Echocardiogram Limited    Narrative    [id number]  [id number]  [id number]  [id number]^HANNAH^ELIZABETH^[facility],[place]  Echocardiography Laboratory  [address] [place], [place] [id number]     Name: BUCK CABAN  MRN: [id number]  : 1955  Study Date: 2020 11:03 AM  Age: 64 yrs  Gender: Male  Patient Location: [facility]  Reason For Study: Pericardial Effusion  Ordering Physician: ELIZABETH YOUNG  Referring Physician: SHONDA MERCHANT  Performed By: Dee Waldron RDCS     BSA: 1.9 m2  Height: 64 in  Weight: 181 lb  HR: 105  BP: 127/55 mmHg  _____________________________________________________________________________  __        Procedure  Limited Portable Echo Adult.  _____________________________________________________________________________  __        Interpretation Summary  Trivial pericardial effusion is present.  A left pleural effusion is present.  Pericardial effusion size has decreased since previous " study.  _____________________________________________________________________________  __        Left Ventricle  Global and regional left ventricular function is normal with an EF of 60-65%.     Right Ventricle  Right ventricular function, chamber size, wall motion, and thickness are  normal.     Vessels  The inferior vena cava is normal.     Pericardium  Trivial pericardial effusion is present.        Miscellaneous  A left pleural effusion is present.  _____________________________________________________________________________  __                          Report approved by: Ricardo Herzog 07/29/2020 11:39 AM              _____________________________________________________________________________  __            Labs:  [facility]  Recent Labs   Lab 07/30/20  0456 07/29/20  0655 07/28/20  1147 07/27/20  0606    138 137 141   POTASSIUM 4.6 4.9 4.6 4.6   CHLORIDE 112* 110* 110* 109   BRYANNA 7.7* 7.8* 7.8* 7.8*   CO2 24 23 24 25   BUN 30 35* 31* 32*   CR 1.40* 1.33* 1.21 1.31*   GLC 87 167* 142* 179*     CBC  Recent Labs   Lab 07/30/20  0456 07/29/20  0655 07/28/20  1147 07/27/20  0606   WBC 9.7 8.0 9.2 9.1   RBC 2.27* 2.51* 2.61* 2.52*   HGB 6.9* 7.5* 7.9* 7.5*   HCT 22.4* 24.4* 25.7* 24.7*   MCV 99 97 99 98   MCH 30.4 29.9 30.3 29.8   MCHC 30.8* 30.7* 30.7* 30.4*   RDW 17.2* 17.1* 17.1* 16.6*    320 308 267     INR  No lab results found in last 7 days.   Liver Function Studies -   Recent Labs   Lab Test 07/22/20  0414   PROTTOTAL 5.5*   ALBUMIN 2.4*   BILITOTAL 0.4   ALKPHOS 75   AST 28   ALT 25     GLUCOSE:   Recent Labs   Lab 07/30/20  0456 07/30/20  0154 07/29/20  2137 07/29/20 2022 07/29/20  1712 07/29/20  0655 07/29/20  0156 07/28/20  2229  07/28/20  1147  07/27/20  0606  07/26/20  0545  07/25/20  0541   GLC 87  --   --   --   --  167*  --   --   --  142*  --  179*  --  166*  --  245*   BGM  --  145* 252* 309* 332*  --  186* 168*   < >  --    < >  --    < >  --    < >  --     < > = values in this  interval not displayed.

## 2020-07-30 NOTE — PROGRESS NOTES
CLINICAL NUTRITION SERVICES - DISCHARGE NOTE    Patient s discharge needs assessed and discharge planning has been conducted with the multidisciplinary transplant care team including physicians, pharmacy, social work and transplant coordinator.    Follow up/Monitoring:  Once discharged, place outpatient nutrition consult via the transplant team if nutrition concerns arise.    Christianne Rendon, MS, RD, LD, [facility]   6C Pgr: [phone]

## 2020-07-30 NOTE — PLAN OF CARE
PT -   Discharge Planner PT   Patient plan for discharge: home with 24hr assist from family members, prefers OP CR over home PT.   Current status:  CGA sit<>stand from varied heights, better technique and less need for cueing for precautions. Negotiated 3 stairs up and down with railings both sides and Min A progressing to CGA, close guarding needed descending d/t eccentric quad weakness, not yet safe to perform on own. No AD needed for gait in sanders (475', CGA) with only mild unsteadiness on turns.    Barriers to return to prior living situation: fatigue, back pain, balance, endurance, LE weakness, medical, stair negotiation  Recommendations for discharge: Anticipate home and 24hr assist with OP CR, however, must demo 3 stairs safely, preferably with spouse guarding. Need to determine if needs ambulatory AD or not for safe disch as well.   Rationale for recommendations: Pt IND at baseline and will need ongoing skilled PT intervention to improve independence and safety with functional mobility skills prior to returning home. Has support at home.  Entered by: James Mejia 07/30/2020 1:17 PM

## 2020-07-30 NOTE — CONSULTS
Diabetes Education  Received consult request to see this 64 year old Angolan-speaking male and his wife to provide education on Humulin NPH insulin.  Patient is s/p heart transplant.  PRior to admission he was taking insulin glargine (Lantus) for his type 2 diabetes.  Plan for home is NPH insulin 27 units and 15 units am, for steroid hyperglycemia.    Called wife on phone; discussed and reviewed that the RN has gone over use of the Humulin N Kwikpen with them and both state understanding of use.    Octavia An MS, APRN, CNS, CDE, CDTC  [phone]

## 2020-07-30 NOTE — PLAN OF CARE
Discharge Planner OT   Patient plan for discharge: Home w/Assist   Current status: Ambulated to/from bathroom w/SBA using no AE, no LOB noted but slower gait. Completed chair transfer w/CGA using grab bars, SBA to wash anterior of body, DEP to wash posterior of body. SBA to don/doff gown, SBA to doff socks using figure-four technique, dep to don socks after showering task 2/2 fatigue. RA 99% SpO2 and  after activity.   Barriers to return to prior living situation: medical status, deconditioning  Recommendations for discharge: home with A and OP CR  Rationale for recommendations: Pt may benefit from skilled therapy to increase independence with ADLs and increase activity tolerance for safe return home with assist       Entered by: Monica Logan 07/30/2020 8:21 AM

## 2020-07-31 ENCOUNTER — APPOINTMENT (OUTPATIENT)
Dept: ULTRASOUND IMAGING | Facility: CLINIC | Age: 65
DRG: 001 | End: 2020-07-31
Attending: NURSE PRACTITIONER
Payer: COMMERCIAL

## 2020-07-31 ENCOUNTER — APPOINTMENT (OUTPATIENT)
Dept: OCCUPATIONAL THERAPY | Facility: CLINIC | Age: 65
DRG: 001 | End: 2020-07-31
Attending: INTERNAL MEDICINE
Payer: COMMERCIAL

## 2020-07-31 ENCOUNTER — RESULTS ONLY (OUTPATIENT)
Dept: OTHER | Facility: CLINIC | Age: 65
End: 2020-07-31

## 2020-07-31 ENCOUNTER — APPOINTMENT (OUTPATIENT)
Dept: PHYSICAL THERAPY | Facility: CLINIC | Age: 65
DRG: 001 | End: 2020-07-31
Attending: INTERNAL MEDICINE
Payer: COMMERCIAL

## 2020-07-31 LAB
ABO + RH BLD: NORMAL
ABO + RH BLD: NORMAL
ANION GAP SERPL CALCULATED.3IONS-SCNC: 6 MMOL/L (ref 3–14)
APTT PPP: 28 SEC (ref 22–37)
BLD GP AB SCN SERPL QL: NORMAL
BLD PROD TYP BPU: NORMAL
BLOOD BANK CMNT PATIENT-IMP: NORMAL
BUN SERPL-MCNC: 34 MG/DL (ref 7–30)
CALCIUM SERPL-MCNC: 7.7 MG/DL (ref 8.5–10.1)
CHLORIDE SERPL-SCNC: 110 MMOL/L (ref 94–109)
CO2 SERPL-SCNC: 22 MMOL/L (ref 20–32)
CREAT SERPL-MCNC: 1.47 MG/DL (ref 0.66–1.25)
ERYTHROCYTE [DISTWIDTH] IN BLOOD BY AUTOMATED COUNT: 17.2 % (ref 10–15)
GFR SERPL CREATININE-BSD FRML MDRD: 49 ML/MIN/{1.73_M2}
GLUCOSE BLDC GLUCOMTR-MCNC: 249 MG/DL (ref 70–99)
GLUCOSE BLDC GLUCOMTR-MCNC: 322 MG/DL (ref 70–99)
GLUCOSE BLDC GLUCOMTR-MCNC: 85 MG/DL (ref 70–99)
GLUCOSE SERPL-MCNC: 85 MG/DL (ref 70–99)
HCT VFR BLD AUTO: 23.7 % (ref 40–53)
HGB BLD-MCNC: 7.1 G/DL (ref 13.3–17.7)
MCH RBC QN AUTO: 29.7 PG (ref 26.5–33)
MCHC RBC AUTO-ENTMCNC: 30 G/DL (ref 31.5–36.5)
MCV RBC AUTO: 99 FL (ref 78–100)
NUM BPU REQUESTED: 1
PLATELET # BLD AUTO: 303 10E9/L (ref 150–450)
POTASSIUM SERPL-SCNC: 4.5 MMOL/L (ref 3.4–5.3)
RBC # BLD AUTO: 2.39 10E12/L (ref 4.4–5.9)
SODIUM SERPL-SCNC: 139 MMOL/L (ref 133–144)
SPECIMEN EXP DATE BLD: NORMAL
TACROLIMUS BLD-MCNC: <3 UG/L (ref 5–15)
TME LAST DOSE: ABNORMAL H
WBC # BLD AUTO: 12.2 10E9/L (ref 4–11)

## 2020-07-31 PROCEDURE — 25000132 ZZH RX MED GY IP 250 OP 250 PS 637: Performed by: PEDIATRICS

## 2020-07-31 PROCEDURE — 97530 THERAPEUTIC ACTIVITIES: CPT | Mod: GP | Performed by: PHYSICAL THERAPIST

## 2020-07-31 PROCEDURE — 25000132 ZZH RX MED GY IP 250 OP 250 PS 637: Performed by: INTERNAL MEDICINE

## 2020-07-31 PROCEDURE — 25000132 ZZH RX MED GY IP 250 OP 250 PS 637: Performed by: SURGERY

## 2020-07-31 PROCEDURE — 25000131 ZZH RX MED GY IP 250 OP 636 PS 637: Performed by: NURSE PRACTITIONER

## 2020-07-31 PROCEDURE — 86901 BLOOD TYPING SEROLOGIC RH(D): CPT | Performed by: PEDIATRICS

## 2020-07-31 PROCEDURE — 85027 COMPLETE CBC AUTOMATED: CPT | Performed by: THORACIC SURGERY (CARDIOTHORACIC VASCULAR SURGERY)

## 2020-07-31 PROCEDURE — 25000132 ZZH RX MED GY IP 250 OP 250 PS 637: Performed by: STUDENT IN AN ORGANIZED HEALTH CARE EDUCATION/TRAINING PROGRAM

## 2020-07-31 PROCEDURE — 97535 SELF CARE MNGMENT TRAINING: CPT | Mod: GO

## 2020-07-31 PROCEDURE — 86850 RBC ANTIBODY SCREEN: CPT | Performed by: PEDIATRICS

## 2020-07-31 PROCEDURE — 25000132 ZZH RX MED GY IP 250 OP 250 PS 637: Performed by: CLINICAL NURSE SPECIALIST

## 2020-07-31 PROCEDURE — 25000131 ZZH RX MED GY IP 250 OP 636 PS 637: Performed by: CLINICAL NURSE SPECIALIST

## 2020-07-31 PROCEDURE — 25000132 ZZH RX MED GY IP 250 OP 250 PS 637: Performed by: PHYSICIAN ASSISTANT

## 2020-07-31 PROCEDURE — 21400000 ZZH R&B CCU UMMC

## 2020-07-31 PROCEDURE — 86900 BLOOD TYPING SEROLOGIC ABO: CPT | Performed by: PEDIATRICS

## 2020-07-31 PROCEDURE — 85730 THROMBOPLASTIN TIME PARTIAL: CPT | Performed by: THORACIC SURGERY (CARDIOTHORACIC VASCULAR SURGERY)

## 2020-07-31 PROCEDURE — 25000131 ZZH RX MED GY IP 250 OP 636 PS 637: Performed by: STUDENT IN AN ORGANIZED HEALTH CARE EDUCATION/TRAINING PROGRAM

## 2020-07-31 PROCEDURE — 80197 ASSAY OF TACROLIMUS: CPT | Performed by: NURSE PRACTITIONER

## 2020-07-31 PROCEDURE — 99232 SBSQ HOSP IP/OBS MODERATE 35: CPT | Performed by: NURSE PRACTITIONER

## 2020-07-31 PROCEDURE — 80048 BASIC METABOLIC PNL TOTAL CA: CPT | Performed by: THORACIC SURGERY (CARDIOTHORACIC VASCULAR SURGERY)

## 2020-07-31 PROCEDURE — 86923 COMPATIBILITY TEST ELECTRIC: CPT | Performed by: PEDIATRICS

## 2020-07-31 PROCEDURE — 00000146 ZZHCL STATISTIC GLUCOSE BY METER IP

## 2020-07-31 PROCEDURE — 86832 HLA CLASS I HIGH DEFIN QUAL: CPT | Performed by: INTERNAL MEDICINE

## 2020-07-31 PROCEDURE — 25000128 H RX IP 250 OP 636: Performed by: PHYSICIAN ASSISTANT

## 2020-07-31 PROCEDURE — 93971 EXTREMITY STUDY: CPT | Mod: RT

## 2020-07-31 PROCEDURE — 25000131 ZZH RX MED GY IP 250 OP 636 PS 637: Performed by: INTERNAL MEDICINE

## 2020-07-31 PROCEDURE — 25000132 ZZH RX MED GY IP 250 OP 250 PS 637: Performed by: NURSE PRACTITIONER

## 2020-07-31 PROCEDURE — 86833 HLA CLASS II HIGH DEFIN QUAL: CPT | Performed by: INTERNAL MEDICINE

## 2020-07-31 PROCEDURE — 36592 COLLECT BLOOD FROM PICC: CPT | Performed by: THORACIC SURGERY (CARDIOTHORACIC VASCULAR SURGERY)

## 2020-07-31 RX ORDER — ACETAMINOPHEN 325 MG/1
975 TABLET ORAL EVERY 8 HOURS PRN
Status: DISCONTINUED | OUTPATIENT
Start: 2020-07-31 | End: 2020-08-03 | Stop reason: HOSPADM

## 2020-07-31 RX ORDER — FUROSEMIDE 40 MG
40 TABLET ORAL
Status: DISCONTINUED | OUTPATIENT
Start: 2020-07-31 | End: 2020-08-03

## 2020-07-31 RX ADMIN — ROSUVASTATIN CALCIUM 10 MG: 10 TABLET, FILM COATED ORAL at 08:39

## 2020-07-31 RX ADMIN — SULFAMETHOXAZOLE AND TRIMETHOPRIM 1 TABLET: 400; 80 TABLET ORAL at 08:39

## 2020-07-31 RX ADMIN — Medication 25 MG: at 14:28

## 2020-07-31 RX ADMIN — NYSTATIN 1000000 UNITS: 100000 SUSPENSION ORAL at 12:59

## 2020-07-31 RX ADMIN — Medication 1 TABLET: at 18:42

## 2020-07-31 RX ADMIN — NYSTATIN 1000000 UNITS: 100000 SUSPENSION ORAL at 16:46

## 2020-07-31 RX ADMIN — FUROSEMIDE 40 MG: 40 TABLET ORAL at 18:42

## 2020-07-31 RX ADMIN — PANTOPRAZOLE SODIUM 40 MG: 40 TABLET, DELAYED RELEASE ORAL at 08:39

## 2020-07-31 RX ADMIN — TERBUTALINE SULFATE 5 MG: 5 TABLET ORAL at 05:00

## 2020-07-31 RX ADMIN — PREDNISONE 20 MG: 20 TABLET ORAL at 19:42

## 2020-07-31 RX ADMIN — INSULIN ASPART 8 UNITS: 100 INJECTION, SOLUTION INTRAVENOUS; SUBCUTANEOUS at 12:58

## 2020-07-31 RX ADMIN — NYSTATIN 1000000 UNITS: 100000 SUSPENSION ORAL at 08:39

## 2020-07-31 RX ADMIN — TACROLIMUS 2 MG: 1 CAPSULE ORAL at 08:39

## 2020-07-31 RX ADMIN — INSULIN HUMAN 33 UNITS: 100 INJECTION, SUSPENSION SUBCUTANEOUS at 08:40

## 2020-07-31 RX ADMIN — ACETAMINOPHEN 975 MG: 325 TABLET, FILM COATED ORAL at 05:00

## 2020-07-31 RX ADMIN — Medication 25 MG: at 22:47

## 2020-07-31 RX ADMIN — FONDAPARINUX SODIUM 7.5 MG: 7.5 INJECTION, SOLUTION SUBCUTANEOUS at 14:30

## 2020-07-31 RX ADMIN — TERBUTALINE SULFATE 5 MG: 5 TABLET ORAL at 22:47

## 2020-07-31 RX ADMIN — TACROLIMUS 2 MG: 1 CAPSULE ORAL at 18:42

## 2020-07-31 RX ADMIN — INSULIN HUMAN 10 UNITS: 100 INJECTION, SUSPENSION SUBCUTANEOUS at 19:42

## 2020-07-31 RX ADMIN — Medication 25 MG: at 05:00

## 2020-07-31 RX ADMIN — Medication 1 TABLET: at 08:39

## 2020-07-31 RX ADMIN — MYCOPHENOLATE MOFETIL 1500 MG: 250 CAPSULE ORAL at 18:42

## 2020-07-31 RX ADMIN — TAMSULOSIN HYDROCHLORIDE 0.4 MG: 0.4 CAPSULE ORAL at 08:39

## 2020-07-31 RX ADMIN — FUROSEMIDE 40 MG: 40 TABLET ORAL at 12:59

## 2020-07-31 RX ADMIN — TERBUTALINE SULFATE 5 MG: 5 TABLET ORAL at 14:28

## 2020-07-31 RX ADMIN — GLIMEPIRIDE 6 MG: 4 TABLET ORAL at 08:40

## 2020-07-31 RX ADMIN — PREDNISONE 20 MG: 20 TABLET ORAL at 08:39

## 2020-07-31 RX ADMIN — SITAGLIPTIN 100 MG: 100 TABLET, FILM COATED ORAL at 08:39

## 2020-07-31 RX ADMIN — NYSTATIN 1000000 UNITS: 100000 SUSPENSION ORAL at 19:42

## 2020-07-31 RX ADMIN — MYCOPHENOLATE MOFETIL 1500 MG: 250 CAPSULE ORAL at 08:40

## 2020-07-31 RX ADMIN — VALGANCICLOVIR 450 MG: 450 TABLET, FILM COATED ORAL at 08:39

## 2020-07-31 ASSESSMENT — ACTIVITIES OF DAILY LIVING (ADL)
ADLS_ACUITY_SCORE: 12

## 2020-07-31 ASSESSMENT — MIFFLIN-ST. JEOR: SCORE: 1531

## 2020-07-31 NOTE — PLAN OF CARE
Neuro: A&O x4. Numbness and tingling at baseline.   Cardiac: Sinus rhythm/Sinus Tach. B/P's stable and within limits.   Respiratory: On RA.   Diet/Appetite: Regular diet with good appetite.    GI/: Voiding spontaneously. Has voided a few times without saving. BMx2.  Pain: No c/o pain. Denies need for intervention.   Activity: Up with SBA.   LDA: PICC and PIV are SL.     Plan: Continue to educate on medication management. Enforce need for saving urine and elevating extremities. Wife at bedside and supportive. Continue with POC.

## 2020-07-31 NOTE — PLAN OF CARE
OT 6C  Discharge Planner OT   Patient plan for discharge: Home w/Assist   Current status: SBA for sit<>stand. Pt completed 2 BUE calisthenics x 10 reps with SBA. Pt completed marching in place with no AD and SBA for ~ 1 minute. Limited by pain. Identifies energy conservation techniques that can be used for improved ADL/IADL performance.   Barriers to return to prior living situation: medical status, deconditioning  Recommendations for discharge: Per plan established by the OT, the recommendation for dc location is home with A and OP CR  Rationale for recommendations: Pt may benefit from skilled therapy to increase independence with ADLs and increase activity tolerance for safe return home with assist       Entered by: Chauncey Rodriguez 07/31/2020 10:34 AM

## 2020-07-31 NOTE — PROGRESS NOTES
Cardiovascular Surgery Progress Note  07/31/2020         Assessment and Plan:     Lucian Henderson Sr. is a 64 year old male with a PMH of end-stage ischemic cardiomyopathy, CAD s/p CABG 2008, DMT2, who was admitted to [facility] 07/03 for heart failure exacerbation with cardiogenic shock, underwent right subclavian IABP insertion 07/16 with Dr. Sparrow, and then heart transplant 07/20 with Dr. Griselli. 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     Cardiovascular:   S/p heart transplant  Primary graft dysfunction, improved  TTE 07/20 with EF 20-25%, severe RV dysfunction, suspected due to ischemic time. TTE 07/21 with EF improved to 50-55%. CI robust off pressors/inotrope 07/24. Repeat Echo 7/27 with normal LVEF  - SBP 130s-120s. Hydralazine mg TID on 7/27, titrate as able  - Appears hypervolemic with LE edema, RHC on 7/27 with normal filling pressures, lasix 40 mg po daily held this am per cards, ? Resume with ongoing evidence of hypervolemia and getting RBC's today.   - Chest tubes: removed 7/26, f/u CXR stable  - Removed TPW 7/29, HR NSR 90s-100. Decreased Terbutaline to 5 mg q6H. Wean per cards 2  Immunosuppression per Cards 2  - Simulect induction  - MMF 1500 mg BID  - Tacrolimus start 07/26  - Prednisone taper per cardiology  Serologies/Prophylaxis per Cards 2  - CMV: R+ / D+  - EBV: R+ / D+  - Valcyte 450 mg daily 7/24  - Bacrtim 07/26  - Nystatin s/s   - Aspirin 81 mg daily - Hold with ongoing anemia   - Plan to start statin (LFTs 7/25 WNL), will review with Cards II  Pericardial effusion  Found incidentally on echo 7/27, 1.7 cm. HDS   - Repeat echo (7/29) with decreased size in pericardial effusion    Pulmonary:  Postop mechanical ventilation, resolved  CHANTEL  - Extubated POD 2; Saturating well on RA.   - Supplemental O2 PRN to keep sats > 92%. Wean off as tolerated.  - Pulm toilet, IS, activity and deep breathing  - Resume CPAP at night, patient requests to use hospital  CPAP    Neurology / MSK:  Postop pain  - Tylenol scheduled  - Oxycodone PRN  - Dilaudid IV PRN  - Robaxin PRN     / Renal:  CKD 3  - Cr 1.47 stable, trend daily  - Avoid nephrotoxins  - Diuresis as above  Urinary retention, resolved voiding well   - Tamsulosin 0.4 mg daily, voiding without issues    GI / FEN:   Nutrition  - Diabetic diet, continue bowel regimen    Endocrine:  DMT2  Stress/medication induced induced hyperglycemia  - Consult to endocrine for blood sugar management, appreciate assistance    Infectious Disease:  Leukocytosis, resolved  Donor Streptococcus salivarius bacteremia  - WBC WNL, afebrile, no signs or symptoms of infection  - ID consulted. Completed perioperative antibiotics. Completed 7 day course of Ceftriaxone 2g q24H for donor bacteremia (ended 7/25)  - Blood culture 7/21 NG    Hematology:   Acute postop blood loss anemia and thrombocytopenia  - Hgb 6.9, recheck 7.5, today is 7.1.   - no signs or symptoms of active bleeding,   - transfuse if hgb less than 7    HIT+  RUE DVT  HIT+ 7/19 S/p PLEX 07/19. US UE 07/22 positve for non-occlusive RIJ DVT, and R cephalic vein occlusive thrombus. On bival infusion  - Transitioned to fondaparinux 7/29  - No heparin products  - unable to avoid PICC, difficulty lab draw, unable to obtain labs this am, will place PICC for temporary use over the next few days, avoid RUE given DVT    Prophylaxis:   - Stress ulcer prophylaxis: Pantoprazole 40 mg daily  - DVT prophylaxis: Bival, SCD    Disposition:   - Transferred to  on 07/24  - Rehab recommending likely discharge to home with 24 hour assist, discharge pending tacro level later today or tomorrow       Discussed with CVTS Fellow as needed.  Staff surgeons have been informed of changes through both verbal and written communication.      Vanessa Gates, SONA, CNP  Cardiovascular Thoracic Surgery   Pgr [phone]          Interval History:   No acute complaints. Pain is well controlled. Reports breathing is  "okay, denies any shortness of breath. Appetite is okay. +BM today. Denies any fevers, chills, sweats, lightheadedness, dizziness.          Physical Exam:   Blood pressure 133/74, pulse 102, temperature 98.7  F (37.1  C), temperature source Oral, resp. rate 16, height 1.626 m (5' 4\"), weight 84 kg (185 lb 3 oz), SpO2 96 %.  Vitals:    07/29/20 0520 07/30/20 0500 07/31/20 0600   Weight: 83.4 kg (183 lb 14.4 oz) 84 kg (185 lb 3.2 oz) 84 kg (185 lb 3 oz)      Gen: NAD, sitting up in the chair. Just finished breakfast. Family at the bedside.   CV: regular, S1S2 normal, no murmurs, rubs, or gallops.  Pulm: diminished bases, no rhonchi or wheezes  Abd: soft, non-tender, no guarding, +BS  Ext: 2+ bilateral lower extremity edema  Incision: sternal incision clean, dry, intact, no erythema; left ICD pocket clean, dry, intact, no erythema or drainage, right subclavian IABP site incision clean, dry, intact, no eythema or drainage  Chest Tube sites: dressings clean and dry  Neuro: grossly normal  Psych: calm, cooperative            Data:    Imaging:  reviewed recent imaging  No results found for this or any previous visit (from the past 24 hour(s)).      Labs:  BMP  Recent Labs   Lab 07/31/20  0628 07/30/20  0456 07/29/20  0655 07/28/20  1147    140 138 137   POTASSIUM 4.5 4.6 4.9 4.6   CHLORIDE 110* 112* 110* 110*   BRYANNA 7.7* 7.7* 7.8* 7.8*   CO2 22 24 23 24   BUN 34* 30 35* 31*   CR 1.47* 1.40* 1.33* 1.21   GLC 85 87 167* 142*     CBC  Recent Labs   Lab 07/31/20  0628 07/30/20  0940 07/30/20  0456 07/29/20  0655   WBC 12.2* 11.4* 9.7 8.0   RBC 2.39* 2.53* 2.27* 2.51*   HGB 7.1* 7.5* 6.9* 7.5*   HCT 23.7* 25.1* 22.4* 24.4*   MCV 99 99 99 97   MCH 29.7 29.6 30.4 29.9   MCHC 30.0* 29.9* 30.8* 30.7*   RDW 17.2* 17.4* 17.2* 17.1*    312 309 320     INR  No lab results found in last 7 days.   Liver Function Studies -   Recent Labs   Lab Test 07/22/20  0414   PROTTOTAL 5.5*   ALBUMIN 2.4*   BILITOTAL 0.4   ALKPHOS 75   AST " 28   ALT 25     GLUCOSE:   Recent Labs   Lab 07/31/20  0628 07/30/20  2158 07/30/20  1713 07/30/20  1220 07/30/20  0456 07/30/20  0154 07/29/20  2137 07/29/20  2022  07/29/20  0655  07/28/20  1147  07/27/20  0606  07/26/20  0545   GLC 85  --   --   --  87  --   --   --   --  167*  --  142*  --  179*  --  166*   BGM  --  218* 223* 243*  --  145* 252* 309*   < >  --    < >  --    < >  --    < >  --     < > = values in this interval not displayed.

## 2020-07-31 NOTE — PLAN OF CARE
EDEMA 6C: Orders received for lymphedema evaluation. Pt had RLE ultrasound today, negative for DVT. Pt has 2+ pitting edema in BLEs from MTPs<>knees, R>L. Pt has known non-occlusive RIJ DVT and R cephalic vein occlusive thrombus. Not on AC after OHT. Pt reporting discharge tomorrow. Recommend follow up for OP lymphedema evaluation for gradient compression bandages and transition to compression garements. Pt agreeable. Issued handout with locations, OP lymphedema evaluation ordered in [facility]. Pt declines review of stairs this afternoon 2/2 back pain. Plan for PT in AM for review of stairs/need for AD for ambulation.

## 2020-07-31 NOTE — PROGRESS NOTES
[facility]   Cardiology II Service / Advanced Heart Failure  Daily Consult Note      Patient: Lucian Henderson Sr.  MRN: [id number]  Admission Date: 7/3/2020  Hospital Day # 28    Assessment and Plan: Lucian Henderson Sr. is a 64 year old male with HTN, DMII, obesity, CKD, CHANTEL, ischemic cardiomyopathy and severe LV systolic dysfunction s/p 3V CABG (2008) and primary prevention ICD ( 4/2/15). Admitted 6/3 with heart failure symptoms, worked up for advanced therapies. Underwent IABP and listed state 2, s/p orthotopic heart transplant 7/19. 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 Graft function back to near normal 7/22 (EF 50-55%)    Recommendations:  -continue tacrolimus 2 mg bid, recheck level in AM  -f/u DSAs  -would do RLE US for DVT assessment  -HOLD FONDAPARINUX DAY OF BIOPSIES    # Status post OHT on 7/19/20, history of ICM  # Primary graft dysfunction, resolved  # Chronic immunosuppression  * TTE 7/29/20: LVEF 60-65%, RV normal, trivial pericardial effusion, left pleural effusion  * RHC 7/27/20: RA 9 PA 39/14(25) PCWP 15 Td CO 6.3    Graft Function:   --Volume: slightly fluid up, mostly third spacing, lasix 40 mg  --BP: 120s-140s/60s on hydralazine 25 mg tid  --HR: 90s-100s, terbutaline 5 mg q8hr    Immunosuppression:   --CNI-delaying protocol with basiliximab on 7/19 and 7/24  --prednisone 20 mg BID, per taper with negative biopsies  --Cellcept 1500 mg BID  --tacrolimus trough < 3 today, increased to 2 mg BID yesterday (goal 10-12), daily levels    Serostatus: CMV: D+/R+, EBV: D+/R+, Toxo: D+/R-    Prophylaxis:   --CAV: aspirin 81 mg and rosuvastatin 10 mg daily  --Thrush: Nystatin   --PCP: Bactrim single strength daily  --GI: Protonix   --Osteoporosis: calcium/vitamin D   --CMV:  Valcyte renally dosed 450 mg daily through October  --Toxo: D+/R- Bactrim lifelong     Routine Surveillance:   --week 1 7/27/20: negative  --DSAs pending  --week 2 biopsy due  "8/3/20    #Donor Streptococcus salivarius bacteremia  - transplant ID consult 7/21, treated with ceftriaxone     # Atrial Flutter with RVR pre-op  # Post op arrhythmia. resolved  - resolved with decreasing terbutaline as above     # Thrombocytopenia  # HIT   - HIT antibody positive 7/19  - CORRINE positive, started bivalrudin 7/22, transitioned fondaparinux 7/29, HOLD day of biopsies     # Right internal jugular and axillary vein DVT, non occlusive   # Occlusive thrombus in superficial right cephalic vein   # Asymmetric LE edema  - defer AC due to recent transplant  - recommend RLE US today       Geovanna Andino, DNP, NP-C  Advanced Heart Failure/Cardiology II Service  Pager [phone] [facility] [address]    ================================================================    Subjective/24-Hr Events:   Last 24 hr care team notes reviewed. No overnight events. No complaints today. Looking forward to discharging home. Still with significant LE edema. Denies exertional chest pain, shortness of breath, lightheadedness.     ROS:  4 point ROS including respiratory, CV, GI and  (other than that noted in the HPI) is negative.     Medications: Reviewed in EPIC.     Physical Exam:   /60 (BP Location: Left arm)   Pulse 102   Temp 98.2  F (36.8  C) (Oral)   Resp 18   Ht 1.626 m (5' 4\")   Wt 84 kg (185 lb 3 oz)   SpO2 95%   BMI 31.79 kg/m      GENERAL: Appears comfortable, in no distress.  HEENT: Eye symmetrical, no discharge or icterus bilaterally. Mucous membranes moist and without lesions.  NECK: Supple, JVD just above clavicle at 90 degrees.   CV: Tachycardic and refular, +S1S2, no murmur, rub, or gallop.   RESPIRATORY: Respirations regular, even, and unlabored. Lungs CTA throughout.   GI: Soft, obese and non distended with normoactive bowel sounds present in all quadrants. No tenderness, rebound, guarding.   EXTREMITIES: 2-3+ BLE peripheral edema R>L. 2+ bilateral pedal pulses.    NEUROLOGIC: Alert and oriented x 3. No focal " deficits.   MUSCULOSKELETAL: No joint swelling or tenderness.   SKIN: No jaundice. No rashes or lesions. Sternum stable.     Labs:  CMP  Recent Labs   Lab 07/31/20  0628 07/30/20  0456 07/29/20  0655 07/28/20  1147  07/26/20  0545 07/25/20  0541    140 138 137   < > 140 138   POTASSIUM 4.5 4.6 4.9 4.6   < > 4.7 4.4   CHLORIDE 110* 112* 110* 110*   < > 109 108   CO2 22 24 23 24   < > 26 24   ANIONGAP 6 4 6 3   < > 5 6   GLC 85 87 167* 142*   < > 166* 245*   BUN 34* 30 35* 31*   < > 37* 41*   CR 1.47* 1.40* 1.33* 1.21   < > 1.33* 1.40*   GFRESTIMATED 49* 52* 55* 62   < > 55* 52*   GFRESTBLACK 56* 60* 64 71   < > 64 60*   BRYANNA 7.7* 7.7* 7.8* 7.8*   < > 8.0* 7.7*   MAG  --   --  2.2  --   --   --   --    PHOS  --   --   --   --   --  2.4*  --    PROTTOTAL  --   --   --   --   --   --  5.4*   ALBUMIN  --   --   --   --   --   --  2.2*   BILITOTAL  --   --   --   --   --   --  0.4   ALKPHOS  --   --   --   --   --   --  82   AST  --   --   --   --   --   --  14   ALT  --   --   --   --   --   --  21    < > = values in this interval not displayed.       CBC  Recent Labs   Lab 07/31/20  0628 07/30/20  0940 07/30/20  0456 07/29/20  0655   WBC 12.2* 11.4* 9.7 8.0   RBC 2.39* 2.53* 2.27* 2.51*   HGB 7.1* 7.5* 6.9* 7.5*   HCT 23.7* 25.1* 22.4* 24.4*   MCV 99 99 99 97   MCH 29.7 29.6 30.4 29.9   MCHC 30.0* 29.9* 30.8* 30.7*   RDW 17.2* 17.4* 17.2* 17.1*    312 309 320     Time/Communication  I personally spent a total of 25 minutes. Of that 15 minutes was counseling/coordination of patient's care. Plan of care discussed with patient. See my note above for details.    Patient discussed with Dr. Malhotra.

## 2020-07-31 NOTE — PLAN OF CARE
A:   Neuro: A&Ox4. No complaints of pain. Neurologically intact, denies Headache, numbness and tingling, calls appropriately   Cardiac: SR/ST; denies chest pain.   Respiratory: RA; ALBRIGHT  Diet/appetite: Regular diet; 2L FR.   Endocrine: BS check AC/HS   GI/:  Denies abdominal pain and nausea. Good urine output, voids without difficulty   Activity: SBA  LDAs: double lumen PICC, PIV      P: Continue to monitor Pt status and report changes to treatment team.

## 2020-07-31 NOTE — PROGRESS NOTES
Diabetes Consult Daily  Progress Note          Assessment/Plan:    Lucian Henderson Sr. is a 64 year old male with a PMH of end-stage ischemic cardiomyopathy, CAD s/p CABG 2008, DMT2, who was admitted to [facility] 07/03 for heart failure exacerbation with cardiogenic shock, underwent right subclavian IABP insertion 07/16 with Dr. Sparrow, and then heart transplant 07/20/2020 with Dr. Griselli. Hospital course notable for HIT+ 07/19, underwent PLEX prior to transplant                          Type 2 diabetes:  Plan  - NPH 33 units qAM , increased to 40 units starting (8/1/2020)  -NPH 10 units qPM , decrease to 5 units  - continue glimepiride 6 mg every morning  - sitagliptin 100 mg qAM  - continue aspart correction change to 2 per 30 mg/dL (for inpatient use)  - nutrition consult and diab educ consult in prep for discharge Friday    Tentative discharge Plan: ( plan is in AVS, please update as needed)  NPH 40 units at 8 am with prednione  NPH 5 units at 8 pm with prednisone   Glimepiride 6 mg with breakfast  sitagliptin 100 mg with breakfast  -test blood sugars three - 4 times per day, before meals and at bedtime      Please order at discharge:   Januvia   glimepiride.   Humulin N pen   Pen needles 4mm x32 thao        Outpatient diabetes follow up: Recommend video visit 2-7 days p discharge.  Msg p clin coord HIGH urgency to schedule, recommend follow-up with Marisabel Prieto PA-C 9 she initially saw in the hospital)     Plan discussed with patient and primary team.  Discharge: possibly on 8/1           Interval History:   The last 24 hours progress and nursing notes reviewed.    Lucian is Malay/some English specking,  phone in room, he declined  phone this morning.  Reviewed current insulin plan, all questions were answered.  Blood sugars are moderately controlled on current regime.  Glucose to 85 this am, decreased NPH at night  Blood sugars during the day in the 200's, may  "require increase in NPH ( will monitor)  Up independency in room an dis working with therapies      Next [facility] on 8/3/2020, steroid taper per cardiology protocol       Steroids:  Prednisone 20 mg twice daily    - test claim for NPH =  $8.95/month      Recent Labs   Lab 07/31/20  0745 07/31/20  0628 07/30/20  2158 07/30/20  1713 07/30/20  1220 07/30/20  0456 07/30/20  0154 07/29/20  2137  07/29/20  0655  07/28/20  1147  07/27/20  0606  07/26/20  0545   GLC  --  85  --   --   --  87  --   --   --  167*  --  142*  --  179*  --  166*   BGM 85  --  218* 223* 243*  --  145* 252*   < >  --    < >  --    < >  --    < >  --     < > = values in this interval not displayed.               Review of Systems:   See interval hx          Medications:     Orders Placed This Encounter      Regular Diet Adult       Physical Exam:  Gen: Alert, walking around in the room, NAD   HEENT: NC/AT, mucous membranes are moist  Resp: non-labored  Ext: moving all extremteis   Neuro:oriented x3, communicating clearly  /74 (BP Location: Left arm)   Pulse 102   Temp 98.7  F (37.1  C) (Oral)   Resp 16   Ht 1.626 m (5' 4\")   Wt 84 kg (185 lb 3 oz)   SpO2 96%   BMI 31.79 kg/m             Data:     Lab Results   Component Value Date    A1C 8.2 07/03/2020    A1C 7.9 07/08/2019    A1C 8.5 03/11/2019    A1C 7.6 10/16/2018    A1C 9.8 09/06/2017              CBC RESULTS:   Recent Labs   Lab Test 07/31/20 0628   WBC 12.2*   RBC 2.39*   HGB 7.1*   HCT 23.7*   MCV 99   MCH 29.7   MCHC 30.0*   RDW 17.2*        Recent Labs   Lab Test 07/31/20  0628 07/30/20  0456    140   POTASSIUM 4.5 4.6   CHLORIDE 110* 112*   CO2 22 24   ANIONGAP 6 4   GLC 85 87   BUN 34* 30   CR 1.47* 1.40*   BRYANNA 7.7* 7.7*     Liver Function Studies -   Recent Labs   Lab Test 07/25/20  0541   PROTTOTAL 5.4*   ALBUMIN 2.2*   BILITOTAL 0.4   ALKPHOS 82   AST 14   ALT 21     Lab Results   Component Value Date    INR 1.35 07/20/2020    INR 1.35 07/20/2020    INR 1.52 " 07/20/2020    INR 1.59 07/20/2020    INR 1.23 07/19/2020    INR 1.18 07/19/2020    INR 1.18 07/19/2020    INR 1.05 07/13/2020    INR 1.12 07/03/2020    INR 1.09 07/02/2020    INR 1.05 07/08/2019    INR 1.04 03/25/2019         I spent a total of 25 minutes bedside and on the inpatient unit managing the glycemic care of Lucian Henderson Sr.. Over 50% of my time on the unit was spent counseling the patient  and/or coordinating care regarding .  See note for details.      Fred Arellano -[phone]  Diabetes Management job code [id number]

## 2020-08-01 ENCOUNTER — APPOINTMENT (OUTPATIENT)
Dept: PHYSICAL THERAPY | Facility: CLINIC | Age: 65
DRG: 001 | End: 2020-08-01
Attending: INTERNAL MEDICINE
Payer: COMMERCIAL

## 2020-08-01 LAB
ANION GAP SERPL CALCULATED.3IONS-SCNC: 6 MMOL/L (ref 3–14)
APTT PPP: 25 SEC (ref 22–37)
BLD PROD TYP BPU: NORMAL
BLD UNIT ID BPU: 0
BLOOD PRODUCT CODE: NORMAL
BPU ID: NORMAL
BUN SERPL-MCNC: 35 MG/DL (ref 7–30)
CALCIUM SERPL-MCNC: 7.7 MG/DL (ref 8.5–10.1)
CHLORIDE SERPL-SCNC: 109 MMOL/L (ref 94–109)
CO2 SERPL-SCNC: 24 MMOL/L (ref 20–32)
CREAT SERPL-MCNC: 1.47 MG/DL (ref 0.66–1.25)
ERYTHROCYTE [DISTWIDTH] IN BLOOD BY AUTOMATED COUNT: 17.2 % (ref 10–15)
GFR SERPL CREATININE-BSD FRML MDRD: 49 ML/MIN/{1.73_M2}
GLUCOSE BLDC GLUCOMTR-MCNC: 127 MG/DL (ref 70–99)
GLUCOSE BLDC GLUCOMTR-MCNC: 153 MG/DL (ref 70–99)
GLUCOSE BLDC GLUCOMTR-MCNC: 227 MG/DL (ref 70–99)
GLUCOSE BLDC GLUCOMTR-MCNC: 265 MG/DL (ref 70–99)
GLUCOSE BLDC GLUCOMTR-MCNC: 278 MG/DL (ref 70–99)
GLUCOSE SERPL-MCNC: 120 MG/DL (ref 70–99)
HCT VFR BLD AUTO: 22.7 % (ref 40–53)
HGB BLD-MCNC: 7 G/DL (ref 13.3–17.7)
HGB BLD-MCNC: 9.2 G/DL (ref 13.3–17.7)
LACTATE BLD-SCNC: 1.6 MMOL/L (ref 0.7–2)
MAGNESIUM SERPL-MCNC: 1.7 MG/DL (ref 1.6–2.3)
MCH RBC QN AUTO: 30.4 PG (ref 26.5–33)
MCHC RBC AUTO-ENTMCNC: 30.8 G/DL (ref 31.5–36.5)
MCV RBC AUTO: 99 FL (ref 78–100)
PLATELET # BLD AUTO: 256 10E9/L (ref 150–450)
POTASSIUM SERPL-SCNC: 4.5 MMOL/L (ref 3.4–5.3)
RBC # BLD AUTO: 2.3 10E12/L (ref 4.4–5.9)
SODIUM SERPL-SCNC: 139 MMOL/L (ref 133–144)
TACROLIMUS BLD-MCNC: <3 UG/L (ref 5–15)
TME LAST DOSE: ABNORMAL H
TRANSFUSION STATUS PATIENT QL: NORMAL
TRANSFUSION STATUS PATIENT QL: NORMAL
WBC # BLD AUTO: 12.4 10E9/L (ref 4–11)

## 2020-08-01 PROCEDURE — 85018 HEMOGLOBIN: CPT | Performed by: NURSE PRACTITIONER

## 2020-08-01 PROCEDURE — 80197 ASSAY OF TACROLIMUS: CPT | Performed by: NURSE PRACTITIONER

## 2020-08-01 PROCEDURE — 97530 THERAPEUTIC ACTIVITIES: CPT | Mod: GP

## 2020-08-01 PROCEDURE — 21400000 ZZH R&B CCU UMMC

## 2020-08-01 PROCEDURE — 25000132 ZZH RX MED GY IP 250 OP 250 PS 637: Performed by: STUDENT IN AN ORGANIZED HEALTH CARE EDUCATION/TRAINING PROGRAM

## 2020-08-01 PROCEDURE — 25000131 ZZH RX MED GY IP 250 OP 636 PS 637: Performed by: NURSE PRACTITIONER

## 2020-08-01 PROCEDURE — 25000132 ZZH RX MED GY IP 250 OP 250 PS 637: Performed by: SURGERY

## 2020-08-01 PROCEDURE — 25000132 ZZH RX MED GY IP 250 OP 250 PS 637: Performed by: CLINICAL NURSE SPECIALIST

## 2020-08-01 PROCEDURE — 25000131 ZZH RX MED GY IP 250 OP 636 PS 637: Performed by: STUDENT IN AN ORGANIZED HEALTH CARE EDUCATION/TRAINING PROGRAM

## 2020-08-01 PROCEDURE — 83735 ASSAY OF MAGNESIUM: CPT | Performed by: THORACIC SURGERY (CARDIOTHORACIC VASCULAR SURGERY)

## 2020-08-01 PROCEDURE — 25000132 ZZH RX MED GY IP 250 OP 250 PS 637: Performed by: INTERNAL MEDICINE

## 2020-08-01 PROCEDURE — 85027 COMPLETE CBC AUTOMATED: CPT | Performed by: THORACIC SURGERY (CARDIOTHORACIC VASCULAR SURGERY)

## 2020-08-01 PROCEDURE — P9016 RBC LEUKOCYTES REDUCED: HCPCS | Performed by: PEDIATRICS

## 2020-08-01 PROCEDURE — 25000132 ZZH RX MED GY IP 250 OP 250 PS 637: Performed by: PEDIATRICS

## 2020-08-01 PROCEDURE — 25000128 H RX IP 250 OP 636: Performed by: SURGERY

## 2020-08-01 PROCEDURE — 25000131 ZZH RX MED GY IP 250 OP 636 PS 637: Performed by: INTERNAL MEDICINE

## 2020-08-01 PROCEDURE — 00000146 ZZHCL STATISTIC GLUCOSE BY METER IP

## 2020-08-01 PROCEDURE — 83605 ASSAY OF LACTIC ACID: CPT | Performed by: THORACIC SURGERY (CARDIOTHORACIC VASCULAR SURGERY)

## 2020-08-01 PROCEDURE — 99232 SBSQ HOSP IP/OBS MODERATE 35: CPT | Performed by: NURSE PRACTITIONER

## 2020-08-01 PROCEDURE — 25000132 ZZH RX MED GY IP 250 OP 250 PS 637: Performed by: NURSE PRACTITIONER

## 2020-08-01 PROCEDURE — 97116 GAIT TRAINING THERAPY: CPT | Mod: GP

## 2020-08-01 PROCEDURE — 36592 COLLECT BLOOD FROM PICC: CPT | Performed by: NURSE PRACTITIONER

## 2020-08-01 PROCEDURE — 25000132 ZZH RX MED GY IP 250 OP 250 PS 637: Performed by: PHYSICIAN ASSISTANT

## 2020-08-01 PROCEDURE — 80048 BASIC METABOLIC PNL TOTAL CA: CPT | Performed by: THORACIC SURGERY (CARDIOTHORACIC VASCULAR SURGERY)

## 2020-08-01 PROCEDURE — 85730 THROMBOPLASTIN TIME PARTIAL: CPT | Performed by: THORACIC SURGERY (CARDIOTHORACIC VASCULAR SURGERY)

## 2020-08-01 PROCEDURE — 36592 COLLECT BLOOD FROM PICC: CPT | Performed by: THORACIC SURGERY (CARDIOTHORACIC VASCULAR SURGERY)

## 2020-08-01 PROCEDURE — 25000128 H RX IP 250 OP 636: Performed by: PHYSICIAN ASSISTANT

## 2020-08-01 RX ORDER — TACROLIMUS 1 MG/1
3 CAPSULE ORAL
Status: DISCONTINUED | OUTPATIENT
Start: 2020-08-02 | End: 2020-08-03

## 2020-08-01 RX ORDER — HYDRALAZINE HYDROCHLORIDE 50 MG/1
50 TABLET, FILM COATED ORAL EVERY 8 HOURS SCHEDULED
Status: DISCONTINUED | OUTPATIENT
Start: 2020-08-01 | End: 2020-08-03

## 2020-08-01 RX ORDER — TACROLIMUS 1 MG/1
4 CAPSULE ORAL
Status: COMPLETED | OUTPATIENT
Start: 2020-08-01 | End: 2020-08-01

## 2020-08-01 RX ORDER — NYSTATIN 100000/ML
1000000 SUSPENSION, ORAL (FINAL DOSE FORM) ORAL 4 TIMES DAILY
Status: DISCONTINUED | OUTPATIENT
Start: 2020-08-01 | End: 2020-08-03 | Stop reason: HOSPADM

## 2020-08-01 RX ORDER — TERBUTALINE SULFATE 2.5 MG/1
2.5 TABLET ORAL EVERY 8 HOURS
Status: DISCONTINUED | OUTPATIENT
Start: 2020-08-01 | End: 2020-08-02

## 2020-08-01 RX ORDER — ASPIRIN 81 MG/1
81 TABLET, CHEWABLE ORAL DAILY
Status: DISCONTINUED | OUTPATIENT
Start: 2020-08-01 | End: 2020-08-02

## 2020-08-01 RX ADMIN — NYSTATIN 1000000 UNITS: 100000 SUSPENSION ORAL at 07:49

## 2020-08-01 RX ADMIN — GLIMEPIRIDE 6 MG: 4 TABLET ORAL at 07:50

## 2020-08-01 RX ADMIN — Medication 25 MG: at 06:03

## 2020-08-01 RX ADMIN — FUROSEMIDE 40 MG: 40 TABLET ORAL at 16:47

## 2020-08-01 RX ADMIN — PREDNISONE 20 MG: 20 TABLET ORAL at 19:58

## 2020-08-01 RX ADMIN — MAGNESIUM SULFATE IN WATER 2 G: 40 INJECTION, SOLUTION INTRAVENOUS at 14:16

## 2020-08-01 RX ADMIN — Medication 1 TABLET: at 07:50

## 2020-08-01 RX ADMIN — FONDAPARINUX SODIUM 7.5 MG: 7.5 INJECTION, SOLUTION SUBCUTANEOUS at 14:16

## 2020-08-01 RX ADMIN — TACROLIMUS 4 MG: 1 CAPSULE ORAL at 17:23

## 2020-08-01 RX ADMIN — PREDNISONE 20 MG: 20 TABLET ORAL at 07:50

## 2020-08-01 RX ADMIN — PANTOPRAZOLE SODIUM 40 MG: 40 TABLET, DELAYED RELEASE ORAL at 07:50

## 2020-08-01 RX ADMIN — SITAGLIPTIN 100 MG: 100 TABLET, FILM COATED ORAL at 07:48

## 2020-08-01 RX ADMIN — TERBUTALINE SULFATE 2.5 MG: 2.5 TABLET ORAL at 22:06

## 2020-08-01 RX ADMIN — Medication 1 TABLET: at 17:23

## 2020-08-01 RX ADMIN — FUROSEMIDE 40 MG: 40 TABLET ORAL at 07:50

## 2020-08-01 RX ADMIN — MYCOPHENOLATE MOFETIL 1500 MG: 250 CAPSULE ORAL at 17:23

## 2020-08-01 RX ADMIN — NYSTATIN 1000000 UNITS: 100000 SUSPENSION ORAL at 16:46

## 2020-08-01 RX ADMIN — INSULIN ASPART 10 UNITS: 100 INJECTION, SOLUTION INTRAVENOUS; SUBCUTANEOUS at 12:15

## 2020-08-01 RX ADMIN — TACROLIMUS 2 MG: 1 CAPSULE ORAL at 07:49

## 2020-08-01 RX ADMIN — NYSTATIN 1000000 UNITS: 100000 SUSPENSION ORAL at 12:15

## 2020-08-01 RX ADMIN — NYSTATIN 1000000 UNITS: 100000 SUSPENSION ORAL at 19:58

## 2020-08-01 RX ADMIN — TAMSULOSIN HYDROCHLORIDE 0.4 MG: 0.4 CAPSULE ORAL at 07:50

## 2020-08-01 RX ADMIN — ROSUVASTATIN CALCIUM 10 MG: 10 TABLET, FILM COATED ORAL at 07:50

## 2020-08-01 RX ADMIN — VALGANCICLOVIR 450 MG: 450 TABLET, FILM COATED ORAL at 07:50

## 2020-08-01 RX ADMIN — Medication 25 MG: at 14:16

## 2020-08-01 RX ADMIN — HYDRALAZINE HYDROCHLORIDE 50 MG: 50 TABLET, FILM COATED ORAL at 22:08

## 2020-08-01 RX ADMIN — SULFAMETHOXAZOLE AND TRIMETHOPRIM 1 TABLET: 400; 80 TABLET ORAL at 07:50

## 2020-08-01 RX ADMIN — TERBUTALINE SULFATE 5 MG: 5 TABLET ORAL at 06:03

## 2020-08-01 RX ADMIN — TERBUTALINE SULFATE 2.5 MG: 2.5 TABLET ORAL at 14:16

## 2020-08-01 RX ADMIN — ACETAMINOPHEN 975 MG: 325 TABLET, FILM COATED ORAL at 07:55

## 2020-08-01 RX ADMIN — MYCOPHENOLATE MOFETIL 1500 MG: 250 CAPSULE ORAL at 07:48

## 2020-08-01 RX ADMIN — INSULIN ASPART 8 UNITS: 100 INJECTION, SOLUTION INTRAVENOUS; SUBCUTANEOUS at 17:18

## 2020-08-01 ASSESSMENT — ACTIVITIES OF DAILY LIVING (ADL)
ADLS_ACUITY_SCORE: 12
ADLS_ACUITY_SCORE: 13
ADLS_ACUITY_SCORE: 12

## 2020-08-01 ASSESSMENT — MIFFLIN-ST. JEOR: SCORE: 1518.36

## 2020-08-01 NOTE — PROGRESS NOTES
[facility]   Cardiology II Service / Advanced Heart Failure  Daily Consult Note      Patient: Lucian Henderson Sr.  MRN: [id number]  Admission Date: 7/3/2020  Hospital Day # 29    Assessment and Plan: Lucian Henderson Sr. is a 64 year old male with HTN, DMII, obesity, CKD, CHANTEL, ischemic cardiomyopathy and severe LV systolic dysfunction s/p 3V CABG (2008) and primary prevention ICD ( 4/2/15). Admitted 6/3 with heart failure symptoms, worked up for advanced therapies. Underwent IABP and listed state 2, s/p orthotopic heart transplant 7/19. 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 Graft function back to near normal 7/22 (EF 50-55%)    Recommendations:  -f/u PM tacrolimus level, ok to discharge if level detectable  -f/u DSAs  -continue lasix 40 mg bid  -increase hydralazine to 50 mg tid  -recommend compression stockings during daytime  -decrease terbutaline to 2.5 mg tid  -HOLD FONDAPARINUX Monday morning (day of biopsy)  Addendum: tac <3,  4 mg tonight then 3 mg bid starting tomorrow    # Status post OHT on 7/19/20, history of ICM  # Primary graft dysfunction, resolved  # Chronic immunosuppression  * TTE 7/29/20: LVEF 60-65%, RV normal, trivial pericardial effusion, left pleural effusion  * RHC 7/27/20: RA 9 PA 39/14(25) PCWP 15 Td CO 6.3    Graft Function:   --Volume: slightly fluid up, mostly third spacing, lasix 40 mg bid  --BP: 120s-150/60s on hydralazine 25 mg tid  --HR: 90s-100s, decrease terbutaline 2.5 mg q8hr    Immunosuppression:   --CNI-delaying protocol with basiliximab on 7/19 and 7/24  --prednisone 20 mg BID, per taper with negative biopsies  --Cellcept 1500 mg BID  --tacrolimus trough pending today, 2 mg BID currently (goal 10-12), daily levels    Serostatus: CMV: D+/R+, EBV: D+/R+, Toxo: D+/R-    Prophylaxis:   --CAV: aspirin 81 mg and rosuvastatin 10 mg daily  --Thrush: Nystatin   --PCP: Bactrim single strength daily  --GI: Protonix  "  --Osteoporosis: calcium/vitamin D   --CMV:  Valcyte renally dosed 450 mg daily through October  --Toxo: D+/R- Bactrim lifelong     Routine Surveillance:   --week 1 7/27/20: negative  --DSAs pending  --week 2 biopsy due 8/3/20    #Donor Streptococcus salivarius bacteremia  - transplant ID consult 7/21, treated with ceftriaxone     # Atrial Flutter with RVR pre-op  # Post op arrhythmia. resolved  - resolved with decreasing terbutaline as above     # Thrombocytopenia  # HIT   - HIT antibody positive 7/19  - CORRINE positive, started bivalrudin 7/22, transitioned fondaparinux 7/29, HOLD day of biopsies (8/3)     # Right internal jugular and axillary vein DVT, non occlusive   # Occlusive thrombus in superficial right cephalic vein   # Asymmetric LE edema  RLE US negative for DVT 7/31.  - defer AC due to recent transplant      Geovanna Andino, SONA, NP-C  Advanced Heart Failure/Cardiology II Service  Pager [phone] [facility] [address]    ================================================================    Subjective/24-Hr Events:   Last 24 hr care team notes reviewed. No overnight events. No complaints today except MSK lo back pain, relieved with APAP. Denies exertional shortness of breath, chest pain, lightheadedness. No GI symptoms. LE edema slightly improved, lost 2 lb overnight.     ROS:  4 point ROS including respiratory, CV, GI and  (other than that noted in the HPI) is negative.     Medications: Reviewed in EPIC.     Physical Exam:   BP (!) 153/75 (BP Location: Left arm)   Pulse 102   Temp 98.7  F (37.1  C) (Oral)   Resp 16   Ht 1.626 m (5' 4\")   Wt 84 kg (185 lb 3 oz)   SpO2 98%   BMI 31.79 kg/m      GENERAL: Appears comfortable, in no distress.  HEENT: Eye symmetrical, no discharge or icterus bilaterally. Mucous membranes moist and without lesions.  NECK: Supple, JVD just above clavicle at 90 degrees.   CV: Tachycardic and regular, +S1S2, no murmur, rub, or gallop.   RESPIRATORY: Respirations regular, even, and " unlabored. Lungs CTA throughout.   GI: Soft, obese and non distended with normoactive bowel sounds present in all quadrants. No tenderness, rebound, guarding.   EXTREMITIES: 2-3+ BLE peripheral edema R>L, slightly improved. 2+ bilateral pedal pulses.    NEUROLOGIC: Alert and oriented x 3. No focal deficits.   MUSCULOSKELETAL: No joint swelling or tenderness.   SKIN: No jaundice. No rashes or lesions. Sternum stable.     Labs:  CMP  Recent Labs   Lab 08/01/20  0702 07/31/20  0628 07/30/20  0456 07/29/20  0655  07/26/20  0545    139 140 138   < > 140   POTASSIUM 4.5 4.5 4.6 4.9   < > 4.7   CHLORIDE 109 110* 112* 110*   < > 109   CO2 24 22 24 23   < > 26   ANIONGAP 6 6 4 6   < > 5   * 85 87 167*   < > 166*   BUN 35* 34* 30 35*   < > 37*   CR 1.47* 1.47* 1.40* 1.33*   < > 1.33*   GFRESTIMATED 49* 49* 52* 55*   < > 55*   GFRESTBLACK 56* 56* 60* 64   < > 64   BRYANNA 7.7* 7.7* 7.7* 7.8*   < > 8.0*   MAG 1.7  --   --  2.2  --   --    PHOS  --   --   --   --   --  2.4*    < > = values in this interval not displayed.       CBC  Recent Labs   Lab 08/01/20  0702 07/31/20  0628 07/30/20  0940 07/30/20  0456   WBC 12.4* 12.2* 11.4* 9.7   RBC 2.30* 2.39* 2.53* 2.27*   HGB 7.0* 7.1* 7.5* 6.9*   HCT 22.7* 23.7* 25.1* 22.4*   MCV 99 99 99 99   MCH 30.4 29.7 29.6 30.4   MCHC 30.8* 30.0* 29.9* 30.8*   RDW 17.2* 17.2* 17.4* 17.2*    303 312 309     Time/Communication  I personally spent a total of 25 minutes. Of that 15 minutes was counseling/coordination of patient's care. Plan of care discussed with patient. See my note above for details.    Patient discussed with Dr. Malhotra.

## 2020-08-01 NOTE — PHARMACY-ADMISSION MEDICATION HISTORY
Admission medication history interview status for the 7/3/2020 admission is complete. See Epic admission navigator for allergy information, pharmacy, prior to admission medications and immunization status.     Medication history interview sources:  Admission    Changes made to PTA medication list (reason)  Added: none  Deleted: none  Changed: none    Additional medication history information (including reliability of information, actions taken by pharmacist):    Patient was admitted 6/3/2020 at OSH prior to OHT 7/19/2020. Duration of hospitalization prior to OHT caused difficulty with attainment of med history list before transplantation      Medication history completed by:     Pritesh Brannon PharmD, [facility]  Inpatient clinical pharmacist

## 2020-08-01 NOTE — PLAN OF CARE
AVSS.  Rhythm: SR HR . No arrhythmia this shift. Pt denies discomfort. Incisions/Dsgs CDI.  BLE edema.  Legs elevated. Pt stable.  Slept between cares and assessments in NAD.  Continue to monitor s/p heart transplant 7/20. Notify team with any change in status.

## 2020-08-01 NOTE — PLAN OF CARE
6C OT Cancel. Pt working with PT in AM, and declining in PM due to awaiting lunch. Unable to check back. Will reschedule.

## 2020-08-01 NOTE — PROGRESS NOTES
Cardiovascular Surgery Progress Note  08/01/2020         Assessment and Plan:     Lucian Henderson Sr. is a 64 year old male with a PMH of end-stage ischemic cardiomyopathy, CAD s/p CABG 2008, DMT2, who was admitted to [facility] 07/03 for heart failure exacerbation with cardiogenic shock, underwent right subclavian IABP insertion 07/16 with Dr. Sparrow, and then heart transplant 07/20 with Dr. Griselli. 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     Cardiovascular:   S/p heart transplant  Primary graft dysfunction, improved  TTE 07/20 with EF 20-25%, severe RV dysfunction, suspected due to ischemic time. TTE 07/21 with EF improved to 50-55%. CI robust off pressors/inotrope 07/24. Repeat Echo 7/27 with normal LVEF  - SBP 130s-120s. Hydralazine mg TID on 7/27, titrate as able  - Appears hypervolemic with LE edema, RHC on 7/27 with normal filling pressures, lasix 40 mg po daily held this am per cards, ? Resume with ongoing evidence of hypervolemia and getting RBC's today.   - Chest tubes: removed 7/26, f/u CXR stable  - Removed TPW 7/29, HR NSR 90s-100. Decreased Terbutaline to 5 mg q6H. Wean per cards 2  Immunosuppression per Cards 2  - Simulect induction  - MMF 1500 mg BID  - Tacrolimus start 07/26  - Prednisone taper per cardiology  Serologies/Prophylaxis per Cards 2  - CMV: R+ / D+  - EBV: R+ / D+  - Valcyte 450 mg daily 7/24  - Bacrtim 07/26  - Nystatin s/s   - Aspirin 81 mg daily - Held with ongoing anemia   - Plan to start statin (LFTs 7/25 WNL), will review with Cards II  Pericardial effusion  Found incidentally on echo 7/27, 1.7 cm. HDS   - Repeat echo (7/29) with decreased size in pericardial effusion    Pulmonary:  Postop mechanical ventilation, resolved  CHANTEL  - Extubated POD 2; Saturating well on RA.   - Supplemental O2 PRN to keep sats > 92%. Wean off as tolerated.  - Pulm toilet, IS, activity and deep breathing  - Resume CPAP at night, patient requests to use hospital  CPAP    Neurology / MSK:  Postop pain  - Tylenol scheduled  - Oxycodone PRN  - Dilaudid IV PRN  - Robaxin PRN     / Renal:  CKD 3  - Cr 1.47 stable, trend daily  - Avoid nephrotoxins  - Diuresis as above  Urinary retention, resolved voiding well   - Tamsulosin 0.4 mg daily, voiding without issues  LE Edema  - diuresis and compression stocking when out of bed     GI / FEN:   Nutrition  - Diabetic diet, continue bowel regimen    Endocrine:  DMT2  Stress/medication induced induced hyperglycemia  - Consult to endocrine for blood sugar management, appreciate assistance    Infectious Disease:  Leukocytosis, resolved  Donor Streptococcus salivarius bacteremia  - WBC WNL, afebrile, no signs or symptoms of infection  - ID consulted. Completed perioperative antibiotics. Completed 7 day course of Ceftriaxone 2g q24H for donor bacteremia (ended 7/25)  - Blood culture 7/21     Hematology:   Acute postop blood loss anemia and thrombocytopenia  - Hgb 7.0, 1 unit RBC's given today  - hold ASA, recheck hgb in am    - no signs or symptoms of active bleeding,   - transfuse if hgb less than 7    HIT+  RUE DVT  HIT+ 7/19 S/p PLEX 07/19. US UE 07/22 positve for non-occlusive RIJ DVT, and R cephalic vein occlusive thrombus. On bival infusion  - Transitioned to fondaparinux 7/29  - No heparin products  - unable to avoid PICC, difficulty lab draw, unable to obtain labs this am, will place PICC for temporary use over the next few days, avoid RUE given DVT    Prophylaxis:   - Stress ulcer prophylaxis: Pantoprazole 40 mg daily  - DVT prophylaxis: Bival, SCD    Disposition:   - Transferred to  on 07/24  - Rehab recommending likely discharge to home with 24 hour assist, discharge pending tacro level     Discussed with CVTS Fellow as needed.  Staff surgeons have been informed of changes through both verbal and written communication.      Vanessa Gates, SNOA, CNP  Cardiovascular Thoracic Surgery   Pgr [phone]          Interval History:  "  No acute complaints. Pain is well controlled. Reports breathing is okay, denies any shortness of breath. Appetite is okay. +BM. Denies any fevers, chills, sweats, lightheadedness, dizziness.          Physical Exam:   Blood pressure 126/60, pulse 101, temperature 98.4  F (36.9  C), temperature source Oral, resp. rate 18, height 1.626 m (5' 4\"), weight 82.7 kg (182 lb 6.4 oz), SpO2 97 %.  Vitals:    07/30/20 0500 07/31/20 0600 08/01/20 0801   Weight: 84 kg (185 lb 3.2 oz) 84 kg (185 lb 3 oz) 82.7 kg (182 lb 6.4 oz)      Gen: NAD, sitting up in the chair. Wife is at the bedside.   CV: regular, S1S2 normal, no murmurs, rubs, or gallops.  Pulm: diminished bases, no rhonchi or wheezes  Abd: soft, non-tender, no guarding, +BS  Ext: 1-2+ bilateral lower extremity edema  Incision: sternal incision clean, dry, intact, no erythema; left ICD pocket clean, dry, intact, no erythema or drainage, right subclavian IABP site incision clean, dry, intact, no eythema or drainage  Chest Tube sites: dressings clean and dry  Neuro: grossly normal  Psych: calm, cooperative            Data:    Imaging:  reviewed recent imaging  No results found for this or any previous visit (from the past 24 hour(s)).      Labs:  BMP  Recent Labs   Lab 08/01/20  0702 07/31/20  0628 07/30/20  0456 07/29/20  0655    139 140 138   POTASSIUM 4.5 4.5 4.6 4.9   CHLORIDE 109 110* 112* 110*   BRYANNA 7.7* 7.7* 7.7* 7.8*   CO2 24 22 24 23   BUN 35* 34* 30 35*   CR 1.47* 1.47* 1.40* 1.33*   * 85 87 167*     CBC  Recent Labs   Lab 08/01/20  1355 08/01/20  0702 07/31/20  0628 07/30/20  0940 07/30/20  0456   WBC  --  12.4* 12.2* 11.4* 9.7   RBC  --  2.30* 2.39* 2.53* 2.27*   HGB 9.2* 7.0* 7.1* 7.5* 6.9*   HCT  --  22.7* 23.7* 25.1* 22.4*   MCV  --  99 99 99 99   MCH  --  30.4 29.7 29.6 30.4   MCHC  --  30.8* 30.0* 29.9* 30.8*   RDW  --  17.2* 17.2* 17.4* 17.2*   PLT  --  256 303 312 309     INR  No lab results found in last 7 days.   Liver Function Studies - "   Recent Labs   Lab Test 07/22/20  0414   PROTTOTAL 5.5*   ALBUMIN 2.4*   BILITOTAL 0.4   ALKPHOS 75   AST 28   ALT 25     GLUCOSE:   Recent Labs   Lab 08/01/20  1122 08/01/20  0737 08/01/20  0702 08/01/20  0254 07/31/20  2149 07/31/20  1148 07/31/20  0745 07/31/20  0628  07/30/20  0456  07/29/20  0655  07/28/20  1147  07/27/20  0606   GLC  --   --  120*  --   --   --   --  85  --  87  --  167*  --  142*  --  179*   * 127*  --  153* 322* 249* 85  --    < >  --    < >  --    < >  --    < >  --     < > = values in this interval not displayed.

## 2020-08-01 NOTE — PLAN OF CARE
PT/6C-   Discharge Planner PT   Patient plan for discharge: home  Current status: Pt is SBA for sit<>stand transfers, needing min A towards end of session 2/2 fatigue. Pt ambulates in hallway 100 ft, 200ft, 250ft without AD with close SBA. Needing breaks 2/2 back pain. Pt ascends/descends 3 steps x2 with 1 UE support and min A. AVSS on RA during session  Barriers to return to prior living situation: medical status, decreased activity tolerance, pain, sternal precautions, below baseline  Recommendations for discharge: Home with 24/7 assist, OP CR. Pt reports owning FWW, recommend use for longer distances  Rationale for recommendations: Pt likely to continue to progress to safely discharge home. Pt's wife very supportive and able to assist. Will need continued OP CR to progress aerobic conditioning  Entered by: Hiral Whitfield 08/01/2020 9:14 AM

## 2020-08-01 NOTE — PROGRESS NOTES
Inpatient Diabetes consult service (IDS)    Assessment/plan  1. Diabetes mellitus type 2   Reduce 8 PM NPH to 5 units to align with IDS plan as outlined yesterday.   2  Heart transplant recipient  On corticosteroid  3.  On corticosteroid -     Bridget Yancey MD      Chief complaint/ HPI  Lucian Dalearza Sr. is a 64 year old male with a PMH of end-stage ischemic cardiomyopathy, CAD s/p CABG 2008, DMT2, who was admitted to [facility] 07/03 for heart failure exacerbation with cardiogenic shock, underwent right subclavian IABP insertion 07/16 with Dr. Sparrow, and then heart transplant 07/20 with Dr. Griselli. 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      He was treated with IV insulin from 7/19-7/23/2020  He has been treated with high doses of steroid , starting at 1000 mg on 7/19 and with taper over time. He has been on the current daily dose of prednisone 20 bid since 7/29.    NPH replaced glargine starting 7/25 PM.    sitagliptin and glimeperide started 7/28  Blood sugars are quite variable throughout the day with peak levels high 200s  I have spoken with his nurse who tells me he is not anticipated to discharge today.      Total daily insulin  Today so far: 50 units  7/31/2020: 61  7/30/2020: 60  7/29/2020: 62 units  7/28 49 units    Ordered DM relevant regimen  aspart 2/30 starting at 140 with meals tid and HS  NPH 40 units every morning give with prednisone-lst dose today - yesterday was 33 units  NPH 10 units every evening give with prednisone- on this dose since 7/30  glimeperide 6 mg/day  Prednisone 20 mg bid - on this dose since 7/29  sitagliptin 100 mg/day    Active Diet Order  Orders Placed This Encounter      Regular Diet Adult      Recent Labs   Lab 08/01/20  1122 08/01/20  0737 08/01/20  0702 08/01/20  0254 07/31/20  2149 07/31/20  1148 07/31/20  0745 07/31/20  0628  07/30/20  0456  07/29/20  0655  07/28/20  1147  07/27/20  0606   GLC  --   --  120*  --   --   --    "--  85  --  87  --  167*  --  142*  --  179*   * 127*  --  153* 322* 249* 85  --    < >  --    < >  --    < >  --    < >  --     < > = values in this interval not displayed.       Exam  BP (!) 155/70   Pulse 101   Temp 98  F (36.7  C) (Oral)   Resp 16   Ht 1.626 m (5' 4\")   Wt 82.7 kg (182 lb 6.4 oz)   SpO2 97%   BMI 31.31 kg/m      .    "

## 2020-08-01 NOTE — PLAN OF CARE
D: OHT 7/20/2020    I: Monitored vitals and assessed pt status.   Changed: Received 1 Unit PRBC Hgb 7.0 Recheck 9.2  Running: PICC SL   PRN: Mag replaced per protocol  Tele: SR/ ST   O2: RA  Mobility: Independent/SBA    A: A0x4. VSS. Afebrile. Urinating adequately. Makes needs known. Activity encouraged throughout day. Wife at bedside and supportive. Compression stockings placed on BLE. Discharge plan pending tacro level and Hgb stable.     P: Continue to monitor Pt status and report changes to CVTS.    Temp:  [98.2  F (36.8  C)-98.7  F (37.1  C)] 98.7  F (37.1  C)  Pulse:  [101] 101  Heart Rate:  [] 101  Resp:  [14-20] 14  BP: (117-159)/(47-76) 146/67  SpO2:  [95 %-99 %] 98 %

## 2020-08-02 ENCOUNTER — APPOINTMENT (OUTPATIENT)
Dept: OCCUPATIONAL THERAPY | Facility: CLINIC | Age: 65
DRG: 001 | End: 2020-08-02
Attending: INTERNAL MEDICINE
Payer: COMMERCIAL

## 2020-08-02 ENCOUNTER — APPOINTMENT (OUTPATIENT)
Dept: PHYSICAL THERAPY | Facility: CLINIC | Age: 65
DRG: 001 | End: 2020-08-02
Attending: INTERNAL MEDICINE
Payer: COMMERCIAL

## 2020-08-02 LAB
ANION GAP SERPL CALCULATED.3IONS-SCNC: 6 MMOL/L (ref 3–14)
APTT PPP: 29 SEC (ref 22–37)
BUN SERPL-MCNC: 37 MG/DL (ref 7–30)
CALCIUM SERPL-MCNC: 8.2 MG/DL (ref 8.5–10.1)
CHLORIDE SERPL-SCNC: 105 MMOL/L (ref 94–109)
CO2 SERPL-SCNC: 26 MMOL/L (ref 20–32)
CREAT SERPL-MCNC: 1.5 MG/DL (ref 0.66–1.25)
ERYTHROCYTE [DISTWIDTH] IN BLOOD BY AUTOMATED COUNT: 17.7 % (ref 10–15)
GFR SERPL CREATININE-BSD FRML MDRD: 48 ML/MIN/{1.73_M2}
GLUCOSE BLDC GLUCOMTR-MCNC: 168 MG/DL (ref 70–99)
GLUCOSE BLDC GLUCOMTR-MCNC: 241 MG/DL (ref 70–99)
GLUCOSE BLDC GLUCOMTR-MCNC: 307 MG/DL (ref 70–99)
GLUCOSE BLDC GLUCOMTR-MCNC: 93 MG/DL (ref 70–99)
GLUCOSE SERPL-MCNC: 99 MG/DL (ref 70–99)
HCT VFR BLD AUTO: 26.1 % (ref 40–53)
HGB BLD-MCNC: 8.3 G/DL (ref 13.3–17.7)
MCH RBC QN AUTO: 30.3 PG (ref 26.5–33)
MCHC RBC AUTO-ENTMCNC: 31.8 G/DL (ref 31.5–36.5)
MCV RBC AUTO: 95 FL (ref 78–100)
PLATELET # BLD AUTO: 253 10E9/L (ref 150–450)
POTASSIUM SERPL-SCNC: 4.4 MMOL/L (ref 3.4–5.3)
RBC # BLD AUTO: 2.74 10E12/L (ref 4.4–5.9)
SODIUM SERPL-SCNC: 137 MMOL/L (ref 133–144)
WBC # BLD AUTO: 12 10E9/L (ref 4–11)

## 2020-08-02 PROCEDURE — 25000131 ZZH RX MED GY IP 250 OP 636 PS 637: Performed by: INTERNAL MEDICINE

## 2020-08-02 PROCEDURE — 25000132 ZZH RX MED GY IP 250 OP 250 PS 637: Performed by: STUDENT IN AN ORGANIZED HEALTH CARE EDUCATION/TRAINING PROGRAM

## 2020-08-02 PROCEDURE — 25000132 ZZH RX MED GY IP 250 OP 250 PS 637: Performed by: INTERNAL MEDICINE

## 2020-08-02 PROCEDURE — 21400000 ZZH R&B CCU UMMC

## 2020-08-02 PROCEDURE — 25000131 ZZH RX MED GY IP 250 OP 636 PS 637: Performed by: STUDENT IN AN ORGANIZED HEALTH CARE EDUCATION/TRAINING PROGRAM

## 2020-08-02 PROCEDURE — 85027 COMPLETE CBC AUTOMATED: CPT | Performed by: THORACIC SURGERY (CARDIOTHORACIC VASCULAR SURGERY)

## 2020-08-02 PROCEDURE — 25000131 ZZH RX MED GY IP 250 OP 636 PS 637: Performed by: NURSE PRACTITIONER

## 2020-08-02 PROCEDURE — 25000132 ZZH RX MED GY IP 250 OP 250 PS 637: Performed by: NURSE PRACTITIONER

## 2020-08-02 PROCEDURE — 00000146 ZZHCL STATISTIC GLUCOSE BY METER IP

## 2020-08-02 PROCEDURE — 97535 SELF CARE MNGMENT TRAINING: CPT | Mod: GO

## 2020-08-02 PROCEDURE — 25000132 ZZH RX MED GY IP 250 OP 250 PS 637: Performed by: CLINICAL NURSE SPECIALIST

## 2020-08-02 PROCEDURE — 80048 BASIC METABOLIC PNL TOTAL CA: CPT | Performed by: THORACIC SURGERY (CARDIOTHORACIC VASCULAR SURGERY)

## 2020-08-02 PROCEDURE — 80197 ASSAY OF TACROLIMUS: CPT | Performed by: NURSE PRACTITIONER

## 2020-08-02 PROCEDURE — 99232 SBSQ HOSP IP/OBS MODERATE 35: CPT | Performed by: NURSE PRACTITIONER

## 2020-08-02 PROCEDURE — U0003 INFECTIOUS AGENT DETECTION BY NUCLEIC ACID (DNA OR RNA); SEVERE ACUTE RESPIRATORY SYNDROME CORONAVIRUS 2 (SARS-COV-2) (CORONAVIRUS DISEASE [COVID-19]), AMPLIFIED PROBE TECHNIQUE, MAKING USE OF HIGH THROUGHPUT TECHNOLOGIES AS DESCRIBED BY CMS-2020-01-R: HCPCS | Performed by: NURSE PRACTITIONER

## 2020-08-02 PROCEDURE — 36592 COLLECT BLOOD FROM PICC: CPT | Performed by: THORACIC SURGERY (CARDIOTHORACIC VASCULAR SURGERY)

## 2020-08-02 PROCEDURE — 97530 THERAPEUTIC ACTIVITIES: CPT | Mod: GO

## 2020-08-02 PROCEDURE — 25000132 ZZH RX MED GY IP 250 OP 250 PS 637: Performed by: PEDIATRICS

## 2020-08-02 PROCEDURE — 97116 GAIT TRAINING THERAPY: CPT | Mod: GP | Performed by: REHABILITATION PRACTITIONER

## 2020-08-02 PROCEDURE — 97530 THERAPEUTIC ACTIVITIES: CPT | Mod: GP | Performed by: REHABILITATION PRACTITIONER

## 2020-08-02 PROCEDURE — 85730 THROMBOPLASTIN TIME PARTIAL: CPT | Performed by: THORACIC SURGERY (CARDIOTHORACIC VASCULAR SURGERY)

## 2020-08-02 RX ORDER — FONDAPARINUX SODIUM 7.5 MG/.6ML
7.5 INJECTION SUBCUTANEOUS EVERY 24 HOURS
Qty: 90 SYRINGE | Refills: 3 | Status: SHIPPED | OUTPATIENT
Start: 2020-08-02 | End: 2020-08-03

## 2020-08-02 RX ORDER — LIDOCAINE 40 MG/G
CREAM TOPICAL
Status: DISCONTINUED | OUTPATIENT
Start: 2020-08-02 | End: 2020-08-03 | Stop reason: HOSPADM

## 2020-08-02 RX ORDER — ASPIRIN 81 MG/1
81 TABLET, CHEWABLE ORAL DAILY
Status: DISCONTINUED | OUTPATIENT
Start: 2020-08-02 | End: 2020-08-03 | Stop reason: HOSPADM

## 2020-08-02 RX ADMIN — Medication 1 TABLET: at 18:16

## 2020-08-02 RX ADMIN — INSULIN ASPART 8 UNITS: 100 INJECTION, SOLUTION INTRAVENOUS; SUBCUTANEOUS at 18:16

## 2020-08-02 RX ADMIN — Medication 1 TABLET: at 09:05

## 2020-08-02 RX ADMIN — NYSTATIN 1000000 UNITS: 100000 SUSPENSION ORAL at 09:04

## 2020-08-02 RX ADMIN — HYDRALAZINE HYDROCHLORIDE 50 MG: 50 TABLET, FILM COATED ORAL at 06:01

## 2020-08-02 RX ADMIN — VALGANCICLOVIR 450 MG: 450 TABLET, FILM COATED ORAL at 09:05

## 2020-08-02 RX ADMIN — ASPIRIN 81 MG CHEWABLE TABLET 81 MG: 81 TABLET CHEWABLE at 13:42

## 2020-08-02 RX ADMIN — INSULIN ASPART 12 UNITS: 100 INJECTION, SOLUTION INTRAVENOUS; SUBCUTANEOUS at 12:49

## 2020-08-02 RX ADMIN — TACROLIMUS 3 MG: 1 CAPSULE ORAL at 09:05

## 2020-08-02 RX ADMIN — MYCOPHENOLATE MOFETIL 1500 MG: 250 CAPSULE ORAL at 09:04

## 2020-08-02 RX ADMIN — ROSUVASTATIN CALCIUM 10 MG: 10 TABLET, FILM COATED ORAL at 09:05

## 2020-08-02 RX ADMIN — FUROSEMIDE 40 MG: 40 TABLET ORAL at 09:05

## 2020-08-02 RX ADMIN — SULFAMETHOXAZOLE AND TRIMETHOPRIM 1 TABLET: 400; 80 TABLET ORAL at 09:04

## 2020-08-02 RX ADMIN — NYSTATIN 1000000 UNITS: 100000 SUSPENSION ORAL at 19:53

## 2020-08-02 RX ADMIN — PANTOPRAZOLE SODIUM 40 MG: 40 TABLET, DELAYED RELEASE ORAL at 09:05

## 2020-08-02 RX ADMIN — TAMSULOSIN HYDROCHLORIDE 0.4 MG: 0.4 CAPSULE ORAL at 09:05

## 2020-08-02 RX ADMIN — HYDRALAZINE HYDROCHLORIDE 50 MG: 50 TABLET, FILM COATED ORAL at 22:25

## 2020-08-02 RX ADMIN — TACROLIMUS 3 MG: 1 CAPSULE ORAL at 18:16

## 2020-08-02 RX ADMIN — NYSTATIN 1000000 UNITS: 100000 SUSPENSION ORAL at 13:42

## 2020-08-02 RX ADMIN — SITAGLIPTIN 100 MG: 100 TABLET, FILM COATED ORAL at 09:05

## 2020-08-02 RX ADMIN — TERBUTALINE SULFATE 2.5 MG: 2.5 TABLET ORAL at 06:01

## 2020-08-02 RX ADMIN — PREDNISONE 20 MG: 20 TABLET ORAL at 19:53

## 2020-08-02 RX ADMIN — GLIMEPIRIDE 6 MG: 4 TABLET ORAL at 09:05

## 2020-08-02 RX ADMIN — ACETAMINOPHEN 975 MG: 325 TABLET, FILM COATED ORAL at 09:10

## 2020-08-02 RX ADMIN — PREDNISONE 20 MG: 20 TABLET ORAL at 09:05

## 2020-08-02 RX ADMIN — NYSTATIN 1000000 UNITS: 100000 SUSPENSION ORAL at 16:25

## 2020-08-02 RX ADMIN — MYCOPHENOLATE MOFETIL 1500 MG: 250 CAPSULE ORAL at 18:16

## 2020-08-02 RX ADMIN — FUROSEMIDE 40 MG: 40 TABLET ORAL at 16:25

## 2020-08-02 RX ADMIN — HYDRALAZINE HYDROCHLORIDE 50 MG: 50 TABLET, FILM COATED ORAL at 13:42

## 2020-08-02 ASSESSMENT — ACTIVITIES OF DAILY LIVING (ADL)
ADLS_ACUITY_SCORE: 12

## 2020-08-02 NOTE — PROGRESS NOTES
Cardiovascular Surgery Progress Note  08/02/2020         Assessment and Plan:     Lucian Henderson Sr. is a 64 year old male with a PMH of end-stage ischemic cardiomyopathy, CAD s/p CABG 2008, DMT2, who was admitted to [facility] 07/03 for heart failure exacerbation with cardiogenic shock, underwent right subclavian IABP insertion 07/16 with Dr. Sparrow, and then heart transplant 07/20 with Dr. Griselli. 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     Cardiovascular:   S/p heart transplant  Primary graft dysfunction, improved  TTE 07/20 with EF 20-25%, severe RV dysfunction, suspected due to ischemic time. TTE 07/21 with EF improved to 50-55%. CI robust off pressors/inotrope 07/24. Repeat Echo 7/27 with normal LVEF  - SBP 130s-120s. Hydralazine mg TID on 7/27, titrate as able  - Appears hypervolemic with LE edema, RHC on 7/27 with normal filling pressures, lasix 40 mg po daily held this am per cards, ? Resume with ongoing evidence of hypervolemia and getting RBC's today.   - Chest tubes: removed 7/26, f/u CXR stable  - Removed TPW 7/29, HR NSR 90s-100. Decreased Terbutaline to 5 mg q6H. Wean per cards 2  Immunosuppression per Cards 2  - Simulect induction  - MMF 1500 mg BID  - Tacrolimus start 07/26  - Prednisone taper per cardiology  Serologies/Prophylaxis per Cards 2  - CMV: R+ / D+  - EBV: R+ / D+  - Valcyte 450 mg daily 7/24  - Bacrtim 07/26  - Nystatin s/s   - Aspirin 81 mg daily   - Crestor 10 mg daily   - Effusion found incidentally on echo 7/27, 1.7 cm. HDS   - Repeat echo (7/29) with decreased size in pericardial effusion    Pulmonary:  Postop mechanical ventilation, resolved  CHANTEL  - Extubated POD 2; Saturating well on RA.   - Supplemental O2 PRN to keep sats > 92%. Wean off as tolerated.  - Pulm toilet, IS, activity and deep breathing  - Resume CPAP at night, patient requests to use hospital CPAP    Neurology / MSK:  Postop pain  - Tylenol scheduled  - Oxycodone PRN      / Renal:  CKD 3  - Cr 1.47 stable, trend daily  - Avoid nephrotoxins  - Diuresis as above  Urinary retention, resolved voiding well   - Tamsulosin 0.4 mg daily, voiding without issues  LE Edema  - daily lasix po and compression stocking when out of bed     GI / FEN:   Nutrition  - Diabetic diet, continue bowel regimen    Endocrine:  DMT2  Stress/medication induced induced hyperglycemia  - Consult to endocrine for blood sugar management, appreciate assistance  - gluose      Infectious Disease:  Leukocytosis, resolved  Donor Streptococcus salivarius bacteremia  - WBC 12.0 stable, afebrile, no signs or symptoms of infection  - ID consulted. Completed perioperative antibiotics. Completed 7 day course of Ceftriaxone 2g q24H for donor bacteremia (ended 7/25)  - Blood culture 7/21 NG    Hematology:   Acute postop blood loss anemia and thrombocytopenia  - Hgb 8.3 (7.0), 1 unit RBC's given 8/1  - hold ASA, recheck hgb in am    - no signs or symptoms of active bleeding,   - transfuse if hgb less than 7    HIT+  RUE DVT  HIT+ 7/19 S/p PLEX 07/19. US UE 07/22 positve for non-occlusive RIJ DVT, and R cephalic vein occlusive thrombus. On bival infusion  - Transitioned to fondaparinux 7/29  - No heparin products  - unable to avoid PICC, difficulty lab draw, unable to obtain labs this am, will place PICC for temporary use over the next few days, avoid RUE given DVT    Prophylaxis:   - Stress ulcer prophylaxis: Pantoprazole 40 mg daily  - DVT prophylaxis: Bival, SCD    Disposition:   - Transferred to  on 07/24  - Rehab recommending discharge to home with 24 hour assist, discharge pending tacro level, possibly 8/3  - RHC and biopsy scheduled for 8/3      Discussed with CVTS Fellow as needed.  Staff surgeons have been informed of changes through both verbal and written communication.      Vanessa Gates, SONA, CNP  Cardiovascular Thoracic Surgery   Pgr [phone]          Interval History:   No acute complaints. Pain is well  "controlled. Reports breathing is okay, denies any shortness of breath. Appetite is okay. +BM. Denies any fevers, chills, sweats, lightheadedness, dizziness.          Physical Exam:   Blood pressure (!) 149/72, pulse 101, temperature 98.1  F (36.7  C), temperature source Oral, resp. rate 16, height 1.626 m (5' 4\"), weight 82.7 kg (182 lb 6.4 oz), SpO2 98 %.  Vitals:    07/30/20 0500 07/31/20 0600 08/01/20 0801   Weight: 84 kg (185 lb 3.2 oz) 84 kg (185 lb 3 oz) 82.7 kg (182 lb 6.4 oz)      Gen: NAD, sitting up in the chair. Wife is at the bedside.   CV: regular, S1S2 normal, no murmurs, rubs, or gallops.  Pulm: diminished bases, no rhonchi or wheezes  Abd: soft, non-tender, no guarding, +BS  Ext: 1-2+ bilateral lower extremity edema, compression stocking on   Incision: sternal incision clean, dry, intact, no erythema; left ICD pocket clean, dry, intact, no erythema or drainage, right subclavian IABP site incision clean, dry, intact, no eythema or drainage  Chest Tube sites: dressings clean and dry  Neuro: grossly normal  Psych: calm, cooperative            Data:    Imaging:  reviewed recent imaging  No results found for this or any previous visit (from the past 24 hour(s)).      Labs:  BMP  Recent Labs   Lab 08/02/20  0641 08/01/20  0702 07/31/20  0628 07/30/20  0456    139 139 140   POTASSIUM 4.4 4.5 4.5 4.6   CHLORIDE 105 109 110* 112*   BRYANNA 8.2* 7.7* 7.7* 7.7*   CO2 26 24 22 24   BUN 37* 35* 34* 30   CR 1.50* 1.47* 1.47* 1.40*   GLC 99 120* 85 87     CBC  Recent Labs   Lab 08/02/20  0641 08/01/20  1355 08/01/20  0702 07/31/20  0628 07/30/20  0940   WBC 12.0*  --  12.4* 12.2* 11.4*   RBC 2.74*  --  2.30* 2.39* 2.53*   HGB 8.3* 9.2* 7.0* 7.1* 7.5*   HCT 26.1*  --  22.7* 23.7* 25.1*   MCV 95  --  99 99 99   MCH 30.3  --  30.4 29.7 29.6   MCHC 31.8  --  30.8* 30.0* 29.9*   RDW 17.7*  --  17.2* 17.2* 17.4*     --  256 303 312     INR  No lab results found in last 7 days.   Liver Function Studies -   Recent " Labs   Lab Test 07/22/20  0414   PROTTOTAL 5.5*   ALBUMIN 2.4*   BILITOTAL 0.4   ALKPHOS 75   AST 28   ALT 25     GLUCOSE:   Recent Labs   Lab 08/02/20  1210 08/02/20  0819 08/02/20  0641 08/01/20  2210 08/01/20  1715 08/01/20  1122 08/01/20  0737 08/01/20  0702  07/31/20  0628  07/30/20  0456  07/29/20  0655  07/28/20  1147   GLC  --   --  99  --   --   --   --  120*  --  85  --  87  --  167*  --  142*   * 93  --  227* 265* 278* 127*  --    < >  --    < >  --    < >  --    < >  --     < > = values in this interval not displayed.

## 2020-08-02 NOTE — PROGRESS NOTES
Inpatient Diabetes consult service (IDS)    Assessment/plan  1. Diabetes mellitus type 2 . The variability of the BS is probably due to lack of prandial coverage which is most likely due to education /preference.    Recommend video visit in diabetes clinic at [facility] visit 2-10 days p discharge.  Recommend to schedule follow-up with Marisabel Prieto PA-C as she initially saw in the hospital    2  Heart transplant recipient  On corticosteroid  3.  On corticosteroid -     Bridget Yancey MD    Chief complaint/ HPI  Lucian Henderson Sr. is a 64 year old male with a PMH of end-stage ischemic cardiomyopathy, CAD s/p CABG 2008, DMT2, who was admitted to [facility] 07/03 for heart failure exacerbation with cardiogenic shock, underwent right subclavian IABP insertion 07/16 with Dr. Sparrow, and then heart transplant 07/20 with Dr. Griselli. 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      He was treated with IV insulin from 7/19-7/23/2020  He has been treated with high doses of steroid , starting at 1000 mg on 7/19 and with taper over time. He has been on the current daily dose of prednisone 20 bid since 7/29.    NPH replaced glargine starting 7/25 PM.    Sitagliptin and glimeperide started 7/28  Blood sugars remain quite variable throughout the day with peak levels high 200s and low 99    Total daily insulin  8/1/2020: 65 units  7/31/2020: 61  7/30/2020: 60  7/29/2020: 62 units  7/28 49 units    Ordered DM relevant regimen  aspart 2/30 starting at 140 with meals tid and HS  NPH 40 units every morning give with prednisone-lst dose 8/1/2020  NPH 5 units every evening give with prednisone- lst dose was 8/1/2020  glimeperide 6 mg/day  Prednisone 20 mg bid - on this dose since 7/29  sitagliptin 100 mg/day    Active Diet Order  Orders Placed This Encounter      Regular Diet Adult      NPO per Anesthesia Guidelines for Procedure/Surgery Except for: Meds    Recent Labs   Lab 08/02/20  1241  "08/02/20  1210 08/02/20  0819 08/02/20  0641 08/01/20  2210 08/01/20  1715 08/01/20  1122  08/01/20  0702  07/31/20  0628  07/30/20  0456  07/29/20  0655  07/28/20  1147   GLC  --   --   --  99  --   --   --   --  120*  --  85  --  87  --  167*  --  142*   * 307* 93  --  227* 265* 278*   < >  --    < >  --    < >  --    < >  --    < >  --     < > = values in this interval not displayed.       Exam  BP (!) 171/79 (BP Location: Left arm)   Pulse 101   Temp 98.1  F (36.7  C) (Oral)   Resp 18   Ht 1.626 m (5' 4\")   Wt 82.7 kg (182 lb 6.4 oz)   SpO2 100%   BMI 31.31 kg/m       Ref. Range 8/2/2020 06:41   Sodium Latest Ref Range: 133 - 144 mmol/L 137   Potassium Latest Ref Range: 3.4 - 5.3 mmol/L 4.4   Chloride Latest Ref Range: 94 - 109 mmol/L 105   Carbon Dioxide Latest Ref Range: 20 - 32 mmol/L 26   Urea Nitrogen Latest Ref Range: 7 - 30 mg/dL 37 (H)   Creatinine Latest Ref Range: 0.66 - 1.25 mg/dL 1.50 (H)   GFR Estimate Latest Ref Range: >60 mL/min/1.73_m2 48 (L)   GFR Estimate If Black Latest Ref Range: >60 mL/min/1.73_m2 55 (L)   Calcium Latest Ref Range: 8.5 - 10.1 mg/dL 8.2 (L)   Anion Gap Latest Ref Range: 3 - 14 mmol/L 6   Glucose Latest Ref Range: 70 - 99 mg/dL 99   WBC Latest Ref Range: 4.0 - 11.0 10e9/L 12.0 (H)   Hemoglobin Latest Ref Range: 13.3 - 17.7 g/dL 8.3 (L)   Hematocrit Latest Ref Range: 40.0 - 53.0 % 26.1 (L)   Platelet Count Latest Ref Range: 150 - 450 10e9/L 253     "

## 2020-08-02 NOTE — PROGRESS NOTES
[facility]   Cardiology II Service / Advanced Heart Failure  Daily Consult Note      Patient: Lucian Henderson Sr.  MRN: [id number]  Admission Date: 7/3/2020  Hospital Day # 30    Assessment and Plan: Lucian Henderson Sr. is a 64 year old male with HTN, DMII, obesity, CKD, CHANTEL, ischemic cardiomyopathy and severe LV systolic dysfunction s/p 3V CABG (2008) and primary prevention ICD ( 4/2/15). Admitted 6/3 with heart failure symptoms, worked up for advanced therapies. Underwent IABP and listed state 2, s/p orthotopic heart transplant 7/19. 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 Graft function back to near normal 7/22 (EF 50-55%)    Recommendations:  -f/u PM tacrolimus level, ok to discharge if level detectable  -f/u DSAs  -likely discontinue terbutaline this PM  -hold fodaparinux for bx tomorrow   -if he stays, NPO at midnight for RHC/bx    # Status post OHT on 7/19/20, history of ICM  # Primary graft dysfunction, resolved  # Chronic immunosuppression  * TTE 7/29/20: LVEF 60-65%, RV normal, trivial pericardial effusion, left pleural effusion  * RHC 7/27/20: RA 9 PA 39/14(25) PCWP 15 Td CO 6.3    Graft Function:   --Volume: slightly fluid up, mostly third spacing, lasix 40 mg bid  --BP: 118-130/48-55 on hydralazine 50 mg tid  --HR: 90s, terbutaline 2.5 mg q8hr    Immunosuppression:   --CNI-delaying protocol with basiliximab on 7/19 and 7/24  --prednisone 20 mg BID, per taper with negative biopsies  --Cellcept 1500 mg BID  --tacrolimus trough pending today, 3 mg BID currently (goal 10-12), daily levels    Serostatus: CMV: D+/R+, EBV: D+/R+, Toxo: D+/R-    Prophylaxis:   --CAV: aspirin 81 mg and rosuvastatin 10 mg daily  --Thrush: Nystatin   --PCP: Bactrim single strength daily  --GI: Protonix   --Osteoporosis: calcium/vitamin D   --CMV:  Valcyte renally dosed 450 mg daily through October  --Toxo: D+/R- Bactrim lifelong     Routine Surveillance:   --week 1  "7/27/20: negative  --DSAs pending  --week 2 biopsy due 8/3/20    #Donor Streptococcus salivarius bacteremia  - transplant ID consult 7/21, treated with ceftriaxone     # Atrial Flutter with RVR pre-op  # Post op arrhythmia, resolved  - resolved with decreasing terbutaline as above     # Thrombocytopenia  # HIT   - HIT antibody positive 7/19  - CORRINE positive, started bivalrudin 7/22, transitioned fondaparinux 7/29, HOLD today for biopsies tomorrow     # Right internal jugular and axillary vein DVT, non occlusive   # Occlusive thrombus in superficial right cephalic vein   # Asymmetric LE edema  RLE US negative for DVT 7/31.  - defer AC due to recent transplant      Geovanna Andino, SONA, NP-C  Advanced Heart Failure/Cardiology II Service  Pager [phone] [facility] [phone]    ================================================================    Subjective/24-Hr Events:   Last 24 hr care team notes reviewed. No overnight events. No complaints today. Wearing compression socks and edema improved. Continues to deny orthopnea, PND, exertional shortness of breath. BP improved.     ROS:  4 point ROS including respiratory, CV, GI and  (other than that noted in the HPI) is negative.     Medications: Reviewed in EPIC.     Physical Exam:   /52 (BP Location: Left arm)   Pulse 101   Temp 98.6  F (37  C) (Oral)   Resp 18   Ht 1.626 m (5' 4\")   Wt 82.7 kg (182 lb 6.4 oz)   SpO2 97%   BMI 31.31 kg/m      GENERAL: Appears comfortable, in no distress.  HEENT: Eye symmetrical, no discharge or icterus bilaterally. Mucous membranes moist and without lesions.  NECK: Supple, JVD just above clavicle at 90 degrees.   CV: Tachycardic and regular, +S1S2, no murmur, rub, or gallop.   RESPIRATORY: Respirations regular, even, and unlabored. Lungs CTA throughout.   GI: Soft, obese and non distended with normoactive bowel sounds present in all quadrants. No tenderness, rebound, guarding.   EXTREMITIES: 2+ BLE peripheral edema R>L, slightly " improved. 2+ bilateral pedal pulses.    NEUROLOGIC: Alert and oriented x 3. No focal deficits.   MUSCULOSKELETAL: No joint swelling or tenderness.   SKIN: No jaundice. No rashes or lesions. Sternum stable.     Labs:  CMP  Recent Labs   Lab 08/02/20  0641 08/01/20  0702 07/31/20  0628 07/30/20  0456 07/29/20  0655    139 139 140 138   POTASSIUM 4.4 4.5 4.5 4.6 4.9   CHLORIDE 105 109 110* 112* 110*   CO2 26 24 22 24 23   ANIONGAP 6 6 6 4 6   GLC 99 120* 85 87 167*   BUN 37* 35* 34* 30 35*   CR 1.50* 1.47* 1.47* 1.40* 1.33*   GFRESTIMATED 48* 49* 49* 52* 55*   GFRESTBLACK 55* 56* 56* 60* 64   BRYANNA 8.2* 7.7* 7.7* 7.7* 7.8*   MAG  --  1.7  --   --  2.2       CBC  Recent Labs   Lab 08/02/20  0641 08/01/20  1355 08/01/20  0702 07/31/20  0628 07/30/20  0940   WBC 12.0*  --  12.4* 12.2* 11.4*   RBC 2.74*  --  2.30* 2.39* 2.53*   HGB 8.3* 9.2* 7.0* 7.1* 7.5*   HCT 26.1*  --  22.7* 23.7* 25.1*   MCV 95  --  99 99 99   MCH 30.3  --  30.4 29.7 29.6   MCHC 31.8  --  30.8* 30.0* 29.9*   RDW 17.7*  --  17.2* 17.2* 17.4*     --  256 303 312     Time/Communication  I personally spent a total of 25 minutes. Of that 15 minutes was counseling/coordination of patient's care. Plan of care discussed with patient. See my note above for details.    Patient discussed with Dr. Malhotra.

## 2020-08-02 NOTE — PLAN OF CARE
PT -   Discharge Planner PT   Patient plan for discharge: home  Current status: CGA for sit<>stand transfers, min cueing for sternal precautions but improving.  Pt ambulates in hallway 250ft without AD CGA with w/c follow. No breaks needed today but rates back pain as moderate/severe following activity. Pt ascends/descends 3 steps x3 with light RUE support and min A to initiate. Toe taps on step lack LE control but completes x10 BLE.  Barriers to return to prior living situation: medical status, decreased activity tolerance, pain, sternal precautions, below baseline  Recommendations for discharge: Home with 24/7 assist, OP CR. Pt reports owning FWW, recommend use for longer distances  Rationale for recommendations: Pt likely to continue to progress to safely discharge home. Pt's wife very supportive and able to assist. Will need continued OP CR to progress aerobic conditioning  Entered by: James Mejia 08/02/2020 4:25 PM

## 2020-08-02 NOTE — PLAN OF CARE
Discharge Planner OT   Patient plan for discharge: home with A from spouse and OP CR  Current status: Pt SBA for functional transfers and mobility. Pt ambulating ~450 ft with SBA and 1 brief seated rest break. No overt LOB noted. Pt limited by fatigue. Pt completing standing ADLs at sink with set up and SBA; pt demos increased activity tolerance and IND with ADLs. AVSS on RA throughout.  Barriers to return to prior living situation: deconditioning, post surgical precautions, pain  Recommendations for discharge: home with A and OP CR  Rationale for recommendations: Pt progress well; pt with good support at home for assist for heavy ADL/IADLs and as needed. Pt will benefit from OP CR to maximize cardiopulmonary and functional outcomes.       Entered by: Esther Palacios 08/02/2020 9:09 AM

## 2020-08-02 NOTE — PLAN OF CARE
D: OHT 7/20/2020    I: Monitored vitals and assessed pt status.   Changed: No Change  Running: DL PICC SL  PRN: Tylenol 975mg X1  Tele: SR/ST  O2: RA  Mobility: SBA/Indp    A: A0x4. VSS. Afebrile. Urinating adequately. Makes needs known. Discharge pending Tacro level and stable Hgb.     NPO at midnight for RHC + Biopsy 8/3    P: Continue to monitor Pt status and report changes to CVTS/Cards 2.    Temp:  [98.1  F (36.7  C)-98.9  F (37.2  C)] 98.1  F (36.7  C)  Heart Rate:  [] 101  Resp:  [16-20] 18  BP: (118-171)/(48-79) 171/79  SpO2:  [97 %-100 %] 100 %

## 2020-08-02 NOTE — PLAN OF CARE
D: S/P OHT 7/20 c/b HIT. Hx of ICM, CAD s/p CABG 2008, & DM2.     I/A: A/Ox4. VSS on RA. SR/ST. Denies pain. Regular diet. 2L FR. BG stable overnight. SBA.     P: Discharge pending Tacro level & Hgb. Continue monitoring & notify CVTS of changes/concerns.

## 2020-08-03 ENCOUNTER — APPOINTMENT (OUTPATIENT)
Dept: MEDSURG UNIT | Facility: CLINIC | Age: 65
End: 2020-08-03
Payer: COMMERCIAL

## 2020-08-03 ENCOUNTER — APPOINTMENT (OUTPATIENT)
Dept: EDUCATION SERVICES | Facility: CLINIC | Age: 65
End: 2020-08-03
Attending: PHYSICIAN ASSISTANT
Payer: COMMERCIAL

## 2020-08-03 ENCOUNTER — APPOINTMENT (OUTPATIENT)
Dept: PHYSICAL THERAPY | Facility: CLINIC | Age: 65
DRG: 001 | End: 2020-08-03
Attending: INTERNAL MEDICINE
Payer: COMMERCIAL

## 2020-08-03 ENCOUNTER — APPOINTMENT (OUTPATIENT)
Dept: OCCUPATIONAL THERAPY | Facility: CLINIC | Age: 65
DRG: 001 | End: 2020-08-03
Attending: INTERNAL MEDICINE
Payer: COMMERCIAL

## 2020-08-03 VITALS
SYSTOLIC BLOOD PRESSURE: 167 MMHG | OXYGEN SATURATION: 100 % | WEIGHT: 177.4 LBS | BODY MASS INDEX: 30.29 KG/M2 | DIASTOLIC BLOOD PRESSURE: 79 MMHG | HEIGHT: 64 IN | TEMPERATURE: 96.3 F | RESPIRATION RATE: 16 BRPM | HEART RATE: 101 BPM

## 2020-08-03 DIAGNOSIS — Z94.1 HEART REPLACED BY TRANSPLANT (H): Primary | ICD-10-CM

## 2020-08-03 LAB
ANION GAP SERPL CALCULATED.3IONS-SCNC: 5 MMOL/L (ref 3–14)
APTT PPP: 29 SEC (ref 22–37)
BUN SERPL-MCNC: 43 MG/DL (ref 7–30)
CALCIUM SERPL-MCNC: 8.2 MG/DL (ref 8.5–10.1)
CHLORIDE SERPL-SCNC: 102 MMOL/L (ref 94–109)
CO2 SERPL-SCNC: 28 MMOL/L (ref 20–32)
CREAT SERPL-MCNC: 1.45 MG/DL (ref 0.66–1.25)
ERYTHROCYTE [DISTWIDTH] IN BLOOD BY AUTOMATED COUNT: 17.3 % (ref 10–15)
GFR SERPL CREATININE-BSD FRML MDRD: 50 ML/MIN/{1.73_M2}
GLUCOSE BLDC GLUCOMTR-MCNC: 115 MG/DL (ref 70–99)
GLUCOSE BLDC GLUCOMTR-MCNC: 93 MG/DL (ref 70–99)
GLUCOSE SERPL-MCNC: 104 MG/DL (ref 70–99)
HCT VFR BLD AUTO: 26.8 % (ref 40–53)
HGB BLD-MCNC: 8.5 G/DL (ref 13.3–17.7)
MCH RBC QN AUTO: 30.2 PG (ref 26.5–33)
MCHC RBC AUTO-ENTMCNC: 31.7 G/DL (ref 31.5–36.5)
MCV RBC AUTO: 95 FL (ref 78–100)
PLATELET # BLD AUTO: 264 10E9/L (ref 150–450)
POTASSIUM SERPL-SCNC: 4.4 MMOL/L (ref 3.4–5.3)
RBC # BLD AUTO: 2.81 10E12/L (ref 4.4–5.9)
SARS-COV-2 RNA SPEC QL NAA+PROBE: NOT DETECTED
SODIUM SERPL-SCNC: 135 MMOL/L (ref 133–144)
SPECIMEN SOURCE: NORMAL
TACROLIMUS BLD-MCNC: 3.5 UG/L (ref 5–15)
TACROLIMUS BLD-MCNC: 3.7 UG/L (ref 5–15)
TME LAST DOSE: ABNORMAL H
TME LAST DOSE: ABNORMAL H
WBC # BLD AUTO: 11.1 10E9/L (ref 4–11)

## 2020-08-03 PROCEDURE — 00000146 ZZHCL STATISTIC GLUCOSE BY METER IP

## 2020-08-03 PROCEDURE — 85730 THROMBOPLASTIN TIME PARTIAL: CPT | Performed by: THORACIC SURGERY (CARDIOTHORACIC VASCULAR SURGERY)

## 2020-08-03 PROCEDURE — 97535 SELF CARE MNGMENT TRAINING: CPT | Mod: GO

## 2020-08-03 PROCEDURE — 25000132 ZZH RX MED GY IP 250 OP 250 PS 637: Performed by: PEDIATRICS

## 2020-08-03 PROCEDURE — 80197 ASSAY OF TACROLIMUS: CPT | Performed by: NURSE PRACTITIONER

## 2020-08-03 PROCEDURE — 97116 GAIT TRAINING THERAPY: CPT | Mod: GP | Performed by: REHABILITATION PRACTITIONER

## 2020-08-03 PROCEDURE — 02BK3ZX EXCISION OF RIGHT VENTRICLE, PERCUTANEOUS APPROACH, DIAGNOSTIC: ICD-10-PCS | Performed by: INTERNAL MEDICINE

## 2020-08-03 PROCEDURE — 25000132 ZZH RX MED GY IP 250 OP 250 PS 637: Performed by: CLINICAL NURSE SPECIALIST

## 2020-08-03 PROCEDURE — 80048 BASIC METABOLIC PNL TOTAL CA: CPT | Performed by: THORACIC SURGERY (CARDIOTHORACIC VASCULAR SURGERY)

## 2020-08-03 PROCEDURE — 97530 THERAPEUTIC ACTIVITIES: CPT | Mod: GP | Performed by: REHABILITATION PRACTITIONER

## 2020-08-03 PROCEDURE — 25000131 ZZH RX MED GY IP 250 OP 636 PS 637: Performed by: INTERNAL MEDICINE

## 2020-08-03 PROCEDURE — 85027 COMPLETE CBC AUTOMATED: CPT | Performed by: THORACIC SURGERY (CARDIOTHORACIC VASCULAR SURGERY)

## 2020-08-03 PROCEDURE — 25000132 ZZH RX MED GY IP 250 OP 250 PS 637: Performed by: INTERNAL MEDICINE

## 2020-08-03 PROCEDURE — 93505 ENDOMYOCARDIAL BIOPSY: CPT | Performed by: INTERNAL MEDICINE

## 2020-08-03 PROCEDURE — C1894 INTRO/SHEATH, NON-LASER: HCPCS | Performed by: INTERNAL MEDICINE

## 2020-08-03 PROCEDURE — 25000128 H RX IP 250 OP 636: Performed by: PHYSICIAN ASSISTANT

## 2020-08-03 PROCEDURE — 36592 COLLECT BLOOD FROM PICC: CPT | Performed by: THORACIC SURGERY (CARDIOTHORACIC VASCULAR SURGERY)

## 2020-08-03 PROCEDURE — 25000132 ZZH RX MED GY IP 250 OP 250 PS 637: Performed by: STUDENT IN AN ORGANIZED HEALTH CARE EDUCATION/TRAINING PROGRAM

## 2020-08-03 PROCEDURE — 27210794 ZZH OR GENERAL SUPPLY STERILE: Performed by: INTERNAL MEDICINE

## 2020-08-03 PROCEDURE — 97110 THERAPEUTIC EXERCISES: CPT | Mod: GO

## 2020-08-03 PROCEDURE — 4A023N6 MEASUREMENT OF CARDIAC SAMPLING AND PRESSURE, RIGHT HEART, PERCUTANEOUS APPROACH: ICD-10-PCS | Performed by: INTERNAL MEDICINE

## 2020-08-03 PROCEDURE — 88307 TISSUE EXAM BY PATHOLOGIST: CPT | Performed by: INTERNAL MEDICINE

## 2020-08-03 PROCEDURE — 93505 ENDOMYOCARDIAL BIOPSY: CPT | Mod: 26 | Performed by: INTERNAL MEDICINE

## 2020-08-03 PROCEDURE — 88350 IMFLUOR EA ADDL 1ANTB STN PX: CPT | Performed by: INTERNAL MEDICINE

## 2020-08-03 PROCEDURE — 99232 SBSQ HOSP IP/OBS MODERATE 35: CPT | Mod: 25 | Performed by: NURSE PRACTITIONER

## 2020-08-03 PROCEDURE — 25000125 ZZHC RX 250: Performed by: INTERNAL MEDICINE

## 2020-08-03 PROCEDURE — 25000131 ZZH RX MED GY IP 250 OP 636 PS 637: Performed by: NURSE PRACTITIONER

## 2020-08-03 PROCEDURE — 88346 IMFLUOR 1ST 1ANTB STAIN PX: CPT | Performed by: INTERNAL MEDICINE

## 2020-08-03 PROCEDURE — 25000131 ZZH RX MED GY IP 250 OP 636 PS 637: Performed by: STUDENT IN AN ORGANIZED HEALTH CARE EDUCATION/TRAINING PROGRAM

## 2020-08-03 PROCEDURE — 25000132 ZZH RX MED GY IP 250 OP 250 PS 637: Performed by: NURSE PRACTITIONER

## 2020-08-03 RX ORDER — AMOXICILLIN 250 MG
1 CAPSULE ORAL 2 TIMES DAILY PRN
Qty: 50 TABLET | Refills: 0 | Status: ON HOLD | OUTPATIENT
Start: 2020-08-03 | End: 2020-10-05

## 2020-08-03 RX ORDER — POLYETHYLENE GLYCOL 3350 17 G/17G
17 POWDER, FOR SOLUTION ORAL DAILY
Qty: 7 PACKET | Refills: 0 | Status: ON HOLD | OUTPATIENT
Start: 2020-08-04 | End: 2020-08-28

## 2020-08-03 RX ORDER — TACROLIMUS 0.5 MG/1
CAPSULE ORAL
Qty: 90 CAPSULE | Refills: 1 | Status: SHIPPED | OUTPATIENT
Start: 2020-08-03 | End: 2020-08-06

## 2020-08-03 RX ORDER — VALGANCICLOVIR 450 MG/1
450 TABLET, FILM COATED ORAL DAILY
Qty: 30 TABLET | Refills: 1 | Status: SHIPPED | OUTPATIENT
Start: 2020-08-04 | End: 2020-08-18

## 2020-08-03 RX ORDER — TACROLIMUS 1 MG/1
4 CAPSULE ORAL 2 TIMES DAILY
Qty: 240 CAPSULE | Refills: 1 | Status: SHIPPED | OUTPATIENT
Start: 2020-08-03 | End: 2020-08-06

## 2020-08-03 RX ORDER — FUROSEMIDE 20 MG
20 TABLET ORAL DAILY
Status: DISCONTINUED | OUTPATIENT
Start: 2020-08-03 | End: 2020-08-03 | Stop reason: HOSPADM

## 2020-08-03 RX ORDER — ACETAMINOPHEN 325 MG/1
975 TABLET ORAL EVERY 8 HOURS PRN
Qty: 60 TABLET | Refills: 0 | Status: SHIPPED | OUTPATIENT
Start: 2020-08-03 | End: 2020-09-02

## 2020-08-03 RX ORDER — HYDRALAZINE HYDROCHLORIDE 50 MG/1
50 TABLET, FILM COATED ORAL EVERY 8 HOURS
Qty: 90 TABLET | Refills: 1 | Status: CANCELLED | OUTPATIENT
Start: 2020-08-03

## 2020-08-03 RX ORDER — SULFAMETHOXAZOLE AND TRIMETHOPRIM 400; 80 MG/1; MG/1
1 TABLET ORAL DAILY
Qty: 30 TABLET | Refills: 1 | Status: SHIPPED | OUTPATIENT
Start: 2020-08-04 | End: 2020-08-18

## 2020-08-03 RX ORDER — ROSUVASTATIN CALCIUM 10 MG/1
10 TABLET, COATED ORAL DAILY
Qty: 30 TABLET | Refills: 0 | Status: SHIPPED | OUTPATIENT
Start: 2020-08-04 | End: 2020-08-18

## 2020-08-03 RX ORDER — HYDRALAZINE HYDROCHLORIDE 25 MG/1
75 TABLET, FILM COATED ORAL EVERY 8 HOURS
Qty: 270 TABLET | Refills: 1 | Status: ON HOLD | OUTPATIENT
Start: 2020-08-03 | End: 2020-10-05

## 2020-08-03 RX ORDER — FONDAPARINUX SODIUM 7.5 MG/.6ML
7.5 INJECTION SUBCUTANEOUS EVERY 24 HOURS
Qty: 90 SYRINGE | Refills: 3 | Status: ON HOLD | OUTPATIENT
Start: 2020-08-03 | End: 2020-08-28

## 2020-08-03 RX ORDER — TAMSULOSIN HYDROCHLORIDE 0.4 MG/1
0.4 CAPSULE ORAL DAILY
Qty: 30 CAPSULE | Refills: 1 | Status: SHIPPED | OUTPATIENT
Start: 2020-08-04 | End: 2020-08-18

## 2020-08-03 RX ORDER — FUROSEMIDE 40 MG
40 TABLET ORAL DAILY
Status: DISCONTINUED | OUTPATIENT
Start: 2020-08-04 | End: 2020-08-03 | Stop reason: HOSPADM

## 2020-08-03 RX ORDER — GLIMEPIRIDE 2 MG/1
6 TABLET ORAL
Qty: 90 TABLET | Refills: 1 | Status: SHIPPED | OUTPATIENT
Start: 2020-08-04 | End: 2020-08-18

## 2020-08-03 RX ORDER — ASPIRIN 81 MG/1
81 TABLET, CHEWABLE ORAL DAILY
Qty: 30 TABLET | Refills: 1 | Status: SHIPPED | OUTPATIENT
Start: 2020-08-04 | End: 2020-08-12 | Stop reason: ALTCHOICE

## 2020-08-03 RX ORDER — METHOCARBAMOL 500 MG/1
500 TABLET, FILM COATED ORAL 4 TIMES DAILY PRN
Qty: 30 TABLET | Refills: 1 | Status: ON HOLD | OUTPATIENT
Start: 2020-08-03 | End: 2020-08-28

## 2020-08-03 RX ORDER — TACROLIMUS 1 MG/1
3 CAPSULE ORAL 2 TIMES DAILY
Qty: 360 CAPSULE | Refills: 1 | Status: SHIPPED | OUTPATIENT
Start: 2020-08-03 | End: 2020-08-03

## 2020-08-03 RX ORDER — MYCOPHENOLATE MOFETIL 250 MG/1
1500 CAPSULE ORAL 2 TIMES DAILY
Qty: 360 CAPSULE | Refills: 1 | Status: SHIPPED | OUTPATIENT
Start: 2020-08-03 | End: 2020-08-18

## 2020-08-03 RX ORDER — PREDNISONE 10 MG/1
20 TABLET ORAL 2 TIMES DAILY
Qty: 180 TABLET | Refills: 1 | Status: SHIPPED | OUTPATIENT
Start: 2020-08-03 | End: 2020-08-04

## 2020-08-03 RX ORDER — BLOOD PRESSURE TEST KIT
KIT MISCELLANEOUS
Qty: 100 EACH | Refills: 0 | Status: SHIPPED | OUTPATIENT
Start: 2020-08-03

## 2020-08-03 RX ORDER — TACROLIMUS 1 MG/1
4 CAPSULE ORAL
Status: DISCONTINUED | OUTPATIENT
Start: 2020-08-03 | End: 2020-08-03 | Stop reason: HOSPADM

## 2020-08-03 RX ORDER — FUROSEMIDE 20 MG
TABLET ORAL
Qty: 90 TABLET | Refills: 1 | Status: SHIPPED | OUTPATIENT
Start: 2020-08-03 | End: 2020-08-11

## 2020-08-03 RX ORDER — NYSTATIN 100000/ML
1000000 SUSPENSION, ORAL (FINAL DOSE FORM) ORAL 4 TIMES DAILY
Qty: 800 ML | Refills: 1 | Status: SHIPPED | OUTPATIENT
Start: 2020-08-03 | End: 2020-08-18

## 2020-08-03 RX ORDER — PANTOPRAZOLE SODIUM 40 MG/1
40 TABLET, DELAYED RELEASE ORAL
Qty: 30 TABLET | Refills: 1 | Status: SHIPPED | OUTPATIENT
Start: 2020-08-04 | End: 2020-08-18

## 2020-08-03 RX ADMIN — SULFAMETHOXAZOLE AND TRIMETHOPRIM 1 TABLET: 400; 80 TABLET ORAL at 07:53

## 2020-08-03 RX ADMIN — Medication 1 TABLET: at 07:52

## 2020-08-03 RX ADMIN — GLIMEPIRIDE 6 MG: 4 TABLET ORAL at 07:52

## 2020-08-03 RX ADMIN — HYDRALAZINE HYDROCHLORIDE 50 MG: 50 TABLET, FILM COATED ORAL at 06:27

## 2020-08-03 RX ADMIN — PANTOPRAZOLE SODIUM 40 MG: 40 TABLET, DELAYED RELEASE ORAL at 07:54

## 2020-08-03 RX ADMIN — ASPIRIN 81 MG CHEWABLE TABLET 81 MG: 81 TABLET CHEWABLE at 07:52

## 2020-08-03 RX ADMIN — HYDRALAZINE HYDROCHLORIDE 75 MG: 25 TABLET ORAL at 11:45

## 2020-08-03 RX ADMIN — FUROSEMIDE 20 MG: 20 TABLET ORAL at 13:19

## 2020-08-03 RX ADMIN — TACROLIMUS 3 MG: 1 CAPSULE ORAL at 07:54

## 2020-08-03 RX ADMIN — ROSUVASTATIN CALCIUM 10 MG: 10 TABLET, FILM COATED ORAL at 07:54

## 2020-08-03 RX ADMIN — MYCOPHENOLATE MOFETIL 1500 MG: 250 CAPSULE ORAL at 07:54

## 2020-08-03 RX ADMIN — FUROSEMIDE 40 MG: 40 TABLET ORAL at 07:53

## 2020-08-03 RX ADMIN — PREDNISONE 20 MG: 20 TABLET ORAL at 07:54

## 2020-08-03 RX ADMIN — FONDAPARINUX SODIUM 7.5 MG: 7.5 INJECTION, SOLUTION SUBCUTANEOUS at 14:15

## 2020-08-03 RX ADMIN — NYSTATIN 1000000 UNITS: 100000 SUSPENSION ORAL at 11:45

## 2020-08-03 RX ADMIN — NYSTATIN 1000000 UNITS: 100000 SUSPENSION ORAL at 07:53

## 2020-08-03 RX ADMIN — SITAGLIPTIN 100 MG: 100 TABLET, FILM COATED ORAL at 07:52

## 2020-08-03 RX ADMIN — TAMSULOSIN HYDROCHLORIDE 0.4 MG: 0.4 CAPSULE ORAL at 07:53

## 2020-08-03 RX ADMIN — NYSTATIN 1000000 UNITS: 100000 SUSPENSION ORAL at 16:09

## 2020-08-03 RX ADMIN — VALGANCICLOVIR 450 MG: 450 TABLET, FILM COATED ORAL at 07:54

## 2020-08-03 ASSESSMENT — ACTIVITIES OF DAILY LIVING (ADL)
ADLS_ACUITY_SCORE: 12

## 2020-08-03 ASSESSMENT — MIFFLIN-ST. JEOR: SCORE: 1495.68

## 2020-08-03 NOTE — PROGRESS NOTES
Transplant Social Work Service Discharge Note      Patient Name:  Lucian Henderson SrMerly     Anticipated Discharge Date: 8/3/2020    Discharge Disposition:   Other:  Pt will discharge home.    Plan for 24 hour care for immediate post transplant period: Pt's wife, Shivani, will provide 24hr supervision for 30 days post discharge. Pt also has supportive adult children.     If not local, plans for short term stay:  Pt lives locally    Additional Services/Equipment Arranged:  None      Persons notified of above discharge plan:  Pt and wife    Patient / Family response to discharge plan:  Pt and wife are excited to go home. They are understandably nervous but feel they have a good understanding on the medication management and know to call their post-transplant coordinator with questions. Pt and wife received pharmacy education last Tuesday and spoke to post transplant coordinator last Thursday.     There have been no concerns with coping or mental health post-transplant.     Education and resources provided by SW at discharge: role of transplant  in out patient setting and provided contact info for , coping with transition home, reinforced when to call their post transplant coordinator and writing to donor family    Discussed anticipated pharmacy out of pocket costs: YES    Provided [employer] Donor Letter Writing packet : NO-document not available in Pashto.     Plan:     Pt being discharged home today. Pt has a great support system, whom have been educated on pt's need for 24hr supervision for 30 days post-discharge and have writer and their post transplant coordinator's phone number.

## 2020-08-03 NOTE — PLAN OF CARE
PT -     Discharge Planner PT   Patient plan for discharge: home today with spouse.    Physical Therapy Discharge Summary  Reason for discharge:   Discharge home with 24hr assist of spouse today.   Progress towards goals: See goals on Care Plan in [facility] electronic health record for goal details.  Goals partially met. Current status: Transfers CGA progressing to SBA with sit<>stand. Ambulates SBA, negotiates stairs CGA with occasional min A to help initiate; main limitation is intermittent LBP. Barriers to achieving goals (and if applicable, to prior living situation): limited tolerance for activity, low back pain, decreased strength and endurance.    Recommendation(s):   Continued therapy is recommended as OP CR. Rationale/Recommendations: To progress activity and reduce cardiac risk factors.  Entered by: James Mejia 08/03/2020 2:31 PM

## 2020-08-03 NOTE — PROGRESS NOTES
[facility]   Interventional Cardiology        Consenting/Education for Right Heart Catheterization/Endomyocardial Biopsy     Patient understands as a part of routine surveillance after heart transplant we would like to perform a right heart catheterization/endomyocardial biopsy.  This procedure will be performed by Dr. Lantigua.    Patient understands during the portion for right heart catheterization a fine tube (catheter) is put into the vein of the groin/neck.  It is carefully passed along until it reaches the heart and then goes up into the blood vessels of the lungs. This is done to measure a variety of pressures in your heart and can tell us how well the heart is filling and emptying, as well as monitor fluid status. While the catheter is in your neck/groin vein, a  Biopsy forceps  or pincers at the end of the catheter are used to take the tissue samples. This is may be repeated to get enough samples. The biopsy pieces are very small (one to two millimeters).     Patient also understands risks and complications of the procedure which include, but are not limited to bruising/swelling around the incision site, infection, bleeding, allergic reaction to local anesthetic.      Patient verbalized understanding of risks and benefits of the right heart catheterization and endomyocardial biopsy and has elected to proceed with the procedure.       Noemi NAVA, YVON  [facility] Interventional Cardiology  [phone]

## 2020-08-03 NOTE — PLAN OF CARE
OT 6C  Discharge Planner OT   Patient plan for discharge: Return home with assist  Current status: SBA ambulation and functional transfers. Min A x1 supine to EOB for trunk, from flat bed. Tolerates walking two bouts of ~250 ft with one seated rest break on second bout. 15 minutes on the Nustep.  Barriers to return to prior living situation: medical needs, decreased activity tolerance  Recommendations for discharge: Home with assist and OP CR Phase II  Rationale for recommendations: Pt will benefit from continued skilled therapy to increase IND with ADLs/IADLs and increase activity tolerance.       Entered by: Yin Polanco 08/03/2020 9:14 AM

## 2020-08-03 NOTE — PLAN OF CARE
D: S/P OHT 7/20 c/b HIT. Hx of ICM, CAD s/p CABG 2008, & DM2.      I/A: A/Ox4. VSS on RA. SR/ST. Denies pain. Regular diet. 2L FR. BG stable overnight. SBA/indp.      P: Discharge pending Tacro level & Hgb. NPO at midnight for RHC & biopsy today at 1000. Continue monitoring & notify CVTS of changes/concerns.

## 2020-08-03 NOTE — PROGRESS NOTES
Care Coordinator - Discharge Planning    Admission Date/Time:  7/3/2020  Attending MD:  Griselli, Massimo, MD   Data  Chart reviewed, discussed with interdisciplinary team.   Patient was admitted for:   1. Heart transplant, orthotopic, status (H)    2. Type 2 diabetes mellitus with both eyes affected by proliferative retinopathy and macular edema, with long-term current use of insulin (H)    3. 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 Acute on chronic systolic congestive heart failure (H)    5. Acute on chronic systolic congestive heart failure (H)    6. Heart failure (H)    7. Dental caries    8. Dental caries    9. Heart failure (H)    10. Cardiomyopathy (H)    11. S/P orthotopic heart transplant (H)    12. Heart replaced by transplant (H)    13. Heart replaced by transplant (H)    14. 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  Current Living Situation: Patient lives with spouse.  Support System: Supportive and Involved spouse.   Services Involved: transplant coordinator  Transportation at Discharge: Family or friend will provide  Transportation to Medical Appointments:    - Name of caregiver: spouse    Coordination of Care and Referrals: No home care needs per pt and pt's wife.   This writer spoke to pt and pt's wife regarding discharge planning.   OT recommending OPCR; pt is in agreement with this plan.     Plan  Anticipated Discharge Date:  8/3/2020  Anticipated Discharge Plan:  Discharge to home with OPCR.     CTS Handoff completed:  YES    MAX CRAMER RN BSN  Care Coordinator Unit   [phone]

## 2020-08-03 NOTE — PROGRESS NOTES
[facility]   Cardiology II Service / Advanced Heart Failure  Daily Consult Note      Patient: Lucian Henderson Sr.  MRN: [id number]  Admission Date: 7/3/2020  Hospital Day # 31    Assessment and Plan: Lucian Henderson Sr. is a 64 year old male with HTN, DMII, obesity, CKD, CHANTEL, ischemic cardiomyopathy and severe LV systolic dysfunction s/p 3V CABG (2008) and primary prevention ICD ( 4/2/15). Admitted 6/3 with heart failure symptoms, worked up for advanced therapies. Underwent IABP and listed state 2, s/p orthotopic heart transplant 7/19. 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 Graft function back to near normal 7/22 (EF 50-55%)    Recommendations:  -increase tacrolimus to 4 mg bid, recheck trough in two days  -increase hydral to 75 mg tid  -resume fondaparinux, start taking in AM, HOLD MORNINGS OF BX  -decrease lasix to 40 mg in AM 20 mg in PM  -ok to discharge     # Status post OHT on 7/19/20, history of ICM  # Primary graft dysfunction, resolved  # Chronic immunosuppression  * TTE 7/29/20: LVEF 60-65%, RV normal, trivial pericardial effusion, left pleural effusion  * RHC 7/27/20: RA 9 PA 39/14(25) PCWP 15 Td CO 6.3  * RHC 8/3/20:  RA 6 PA 33/10(20) PCWP 12 Td CI 3.7    Graft Function:   --Volume: RA 6 PCWP 12 decrease lasix to 40 mg in AM 20 mg in PM  --BP: increase hydralazine 75 mg tid  --HR: 90s, off terbutaline     Immunosuppression:   --CNI-delaying protocol with basiliximab on 7/19 and 7/24  --prednisone 20 mg BID, per taper with negative biopsies  --Cellcept 1500 mg BID  --tacrolimus trough 3.5 today (goal 10-12), increase to 4 mg BID and recheck Wednesday    Serostatus: CMV: D+/R+, EBV: D+/R+, Toxo: D+/R-    Prophylaxis:   --CAV: aspirin 81 mg and rosuvastatin 10 mg daily  --Thrush: Nystatin   --PCP: Bactrim single strength daily  --GI: Protonix   --Osteoporosis: calcium/vitamin D   --CMV:  Valcyte renally dosed 450 mg daily through October  --Toxo:  "D+/R- Bactrim lifelong     Routine Surveillance:   --week 1 7/27/20: negative  --DSAs pending from 7/31  --week 2 biopsy 8/3/20 pending    #Donor Streptococcus salivarius bacteremia  - transplant ID consult 7/21, treated with ceftriaxone     # Atrial Flutter with RVR pre-op  # Post op arrhythmia, resolved  - resolved with decreasing terbutaline as above     # Thrombocytopenia  # HIT   - HIT antibody positive 7/19  - CORRINE positive, started bivalrudin 7/22, transitioned fondaparinux 7/29, HOLD MORNING OF BIOPSIES      # Right internal jugular and axillary vein DVT, non occlusive   # Occlusive thrombus in superficial right cephalic vein   # Asymmetric LE edema  RLE US negative for DVT 7/31.  - defer AC due to recent transplant      Geovanna Andino DNP, NP-C  Advanced Heart Failure/Cardiology II Service  Pager [phone] [facility] [phone]    ================================================================    Subjective/24-Hr Events:   Last 24 hr care team notes reviewed. No overnight events. Feeling well. Excited to go home. Tolerating ambulation. Good appetite.     ROS:  4 point ROS including respiratory, CV, GI and  (other than that noted in the HPI) is negative.     Medications: Reviewed in EPIC.     Physical Exam:   BP (!) 167/79 (BP Location: Left arm)   Pulse 101   Temp 96.3  F (35.7  C) (Axillary)   Resp 16   Ht 1.626 m (5' 4\")   Wt 80.5 kg (177 lb 6.4 oz)   SpO2 100%   BMI 30.45 kg/m      GENERAL: Appears comfortable, in no distress.  HEENT: Eye symmetrical, no discharge or icterus bilaterally. Mucous membranes moist and without lesions.  NECK: Supple, JVD not visible at 90 degrees.   CV: Tachycardic and regular, +S1S2, no murmur, rub, or gallop.   RESPIRATORY: Respirations regular, even, and unlabored. Lungs CTA throughout.   GI: Soft, obese and non distended with normoactive bowel sounds present in all quadrants. No tenderness, rebound, guarding.   EXTREMITIES: 2+ BLE peripheral edema R>L, slightly improved. " 2+ bilateral pedal pulses.    NEUROLOGIC: Alert and oriented x 3. No focal deficits.   MUSCULOSKELETAL: No joint swelling or tenderness.   SKIN: No jaundice. No rashes or lesions. Sternum stable.     Labs:  CMP  Recent Labs   Lab 08/03/20  0559 08/02/20  0641 08/01/20  0702 07/31/20  0628  07/29/20  0655    137 139 139   < > 138   POTASSIUM 4.4 4.4 4.5 4.5   < > 4.9   CHLORIDE 102 105 109 110*   < > 110*   CO2 28 26 24 22   < > 23   ANIONGAP 5 6 6 6   < > 6   * 99 120* 85   < > 167*   BUN 43* 37* 35* 34*   < > 35*   CR 1.45* 1.50* 1.47* 1.47*   < > 1.33*   GFRESTIMATED 50* 48* 49* 49*   < > 55*   GFRESTBLACK 57* 55* 56* 56*   < > 64   BRYANNA 8.2* 8.2* 7.7* 7.7*   < > 7.8*   MAG  --   --  1.7  --   --  2.2    < > = values in this interval not displayed.       CBC  Recent Labs   Lab 08/03/20  0559 08/02/20  0641 08/01/20  1355 08/01/20  0702 07/31/20  0628   WBC 11.1* 12.0*  --  12.4* 12.2*   RBC 2.81* 2.74*  --  2.30* 2.39*   HGB 8.5* 8.3* 9.2* 7.0* 7.1*   HCT 26.8* 26.1*  --  22.7* 23.7*   MCV 95 95  --  99 99   MCH 30.2 30.3  --  30.4 29.7   MCHC 31.7 31.8  --  30.8* 30.0*   RDW 17.3* 17.7*  --  17.2* 17.2*    253  --  256 303     Time/Communication  I personally spent a total of 25 minutes. Of that 15 minutes was counseling/coordination of patient's care. Plan of care discussed with patient. See my note above for details.    Patient discussed with Dr. Malhotra.

## 2020-08-03 NOTE — CONSULTS
Met with Lucian for subcutaneous injection education. Patient has given himself insulin before and feels comfortable trying. He was able to properly give himself his own arixtra injection.     Literature Given: Injection medication under the skin

## 2020-08-03 NOTE — DISCHARGE SUMMARY
[facility], [place]   Cardiothoracic Surgery Hospital Discharge Summary       Lucian Henderson Sr. MRN# [id number]   YOB: 1955 Age: 64 year old     Admitting Physician:  Massimo Griselli, MD  Discharge Physician:  Dnoald Rand PA-C   Primary Care Physician:        Suyapa Hatfield     Date of Admission:  7/3/2020    Date of Discharge:  8/3/2020         Primary Diagnoses:   1. Ischemic cardiomyopathy - s/p heart transplant  2. Primary graft dysfunction, resolved  3. Hx of CAD s/p CABG in 2008  4. Type II diabetes  5. HIT  6. RUE DVT  7. Post-op urinary retention  8. Donor streptococcus salvarius bacteremia          Secondary Diagnosis:   1. CHANTEL  2. CKD stage III         Procedures:   Date: 7/16/2020    Surgeon: Dr. Allan Sparrow  1) Right subclavian chimney graft - 8mm Gelweave graft with 9F sheath  2) Placement of 50 ml intra-aortic balloon pump with fluoroscopy and interpretation of images    Date: 7/20/2020    Surgeon: Dr. Massimo Griselli and Dr. Eladia Alonzo  Redo sternotomy  Institution of cardio-pulmonary bypass at 34 C  Orthotopic heart transplant   Removal of ICD   ADARSH by anesthesia  IABP left in situ          Brief History of Illness:   Lucian Henderson Sr. is a 64 year old male with a past medical history of end-stage ischemic cardiomyopathy, CAD s/p CABG 2008, DMT2, who was admitted to [facility] 07/03 for heart failure exacerbation with cardiogenic shock, underwent right subclavian IABP insertion 07/16 with Dr. Sparrow, and then heart transplant 07/20 with Dr. Griselli.           Post-operative Hospital Course:   Lucian Henderson Sr. is a 64 year old male who on 7/19/2020 underwent the above-named procedures.  Patient tolerated the operation well and was admitted to the CVICU post-operatively.  Patient was extubated on POD # 2.  Pressors were weaned off as able. Patient's ICU stay was remarkable for primary graft dysfunction. POD 1 echo with EF  20-25%, severe RV dysfunction likely secondary to prolonged ischemic time. Repeat echo on 7/27 showed normal LVEF. Patient was transferred to the surgical floor on 7/24/2020.     Patient continued to improve post-operatively. Patient was started on ASA, crestor. Hydralazine was started for hypertension, this was titrated to 75 mg TID at time of discharge. Patient was diuresed with IV lasix and will be discharged on 40 mg PO in the am, 20 mg PO in the afternoon. Chest tubes were removed when drainage decreased and follow-up CXR was negative for any significant pneumothorax. Terbutaline was weaned off prior to discharge and TPW were removed when appropriate. Immunosuppression per cardiology, will be discharged on tacrolimus 4 mg PO BID and prednisone 20 mg BID.     Remained of hospital stay by problem below:    Pericardial effusion - Effusion found incidentally on echo 7/27, 1.7 cm. HDS. Repeat echo (7/29) with decreased size in pericardial effusion     DMT2/steroid induced hyperglycemia - Endocrine following post-operatively. Will be discharged on Januvia and amaryl, along with NPH 35U at 8 am, along with meal time sliding scale insulin. Recommend video visit 2-7 days p discharge.  Msg p clin coord HIGH urgency to schedule, recommend follow-up with Marisabel Prieto PA-C ( she initially saw in the hospital and knows pt from clinic)-- message sent again to clinic coordinator 8/3.  Will also update Ms Prieto via staff message    Donor Streptococcus salivarius bacteremia - ID consulted. Completed perioperative antibiotics. Completed 7 day course of Ceftriaxone 2g q24H for donor bacteremia (ended 7/25). Blood culture 7/21 NG. Afebrile and no signs of infection at discharge     HIT+/RUE DVT - HIT+ 7/19 S/p PLEX 07/19. US UE 07/22 positive for non-occlusive RIJ DVT, and R cephalic vein occlusive thrombus. On bival infusion which was transitioned to fondaparinux 7/29. Will continue this at discharge and plan to hold  fondaparinux injection morning of RHC/biopsies. Continue for 3 months, then will need to be reassessed for continuation of anticoagulation.     Urinary retention - resolved voiding without problems. Will be discharged on Tamsulosin 0.4 mg daily    Post-operative pain control included IV dilaudid, PO oxycodone and tylenol and will be discharged on tylenol.     Prior to discharge, patient's pain was controlled well, they were able to perform most ADLs and ambulate without difficulty, and had full return of bowel and bladder function.  On August 3, 2020, patient was Discharged to home in stable condition.      Day of Discharge Exam   Vitals:  Temp:  [96.3  F (35.7  C)-99  F (37.2  C)] 96.3  F (35.7  C)  Heart Rate:  [] 92  Resp:  [16-18] 16  BP: (122-171)/(51-87) 146/87  SpO2:  [95 %-100 %] 98 %  Wt: 177 lbs 6.4 oz, 80 kg   Physical Exam:   Gen: A&Ox4, NAD, conversational  CV: RRR, S1S2 normal, no murmurs, rubs, or gallops  Pulm: Lungs CTA, no rhonchi or wheezes  Abd: +BS, Soft, nondistended, NTTP  Ext:  1+ bilateral lower extremity edema  Neuro: CN II-XII intact, no focal deficits  Incisions: Clean, dry, intact, no erythema  Chest Tube sites: Dressings clean and dry    Labs:  BMP  Recent Labs   Lab 08/03/20  0559 08/02/20  0641 08/01/20  0702 07/31/20  0628  07/29/20  0655    137 139 139   < > 138   POTASSIUM 4.4 4.4 4.5 4.5   < > 4.9   CHLORIDE 102 105 109 110*   < > 110*   CO2 28 26 24 22   < > 23   BUN 43* 37* 35* 34*   < > 35*   CR 1.45* 1.50* 1.47* 1.47*   < > 1.33*   * 99 120* 85   < > 167*   MAG  --   --  1.7  --   --  2.2    < > = values in this interval not displayed.     CBC  Recent Labs   Lab 08/03/20  0559 08/02/20  0641 08/01/20  1355 08/01/20  0702 07/31/20  0628   WBC 11.1* 12.0*  --  12.4* 12.2*   RBC 2.81* 2.74*  --  2.30* 2.39*   HGB 8.5* 8.3* 9.2* 7.0* 7.1*   HCT 26.8* 26.1*  --  22.7* 23.7*   MCV 95 95  --  99 99   MCH 30.2 30.3  --  30.4 29.7   MCHC 31.7 31.8  --  30.8* 30.0*    RDW 17.3* 17.7*  --  17.2* 17.2*    253  --  256 303     INRNo lab results found in last 7 days.   Hepatic Panel No lab results found in last 7 days.         Imaging Studies:   Echocardiogram 7/27/2020:  Global and regional left ventricular function is hyperkinetic with an EF of  65-70%.  Right ventricular function, chamber size, wall motion, and thickness are  normal.  The inferior vena cava is normal.  1.7cm loculated lateral pericardial effusion.    Chest X-ray 7/28/2020:  1. Left PICC tip projects over the mid SVC.  2. Mild left greater than right basilar opacities likely may represent  atelectasis versus infection.    Upper extremity US 7/22/2020:  1.  Nonocclusive thrombus in the right internal jugular vein along the  intravenous catheter demonstrated.  2.  Nonocclusive thrombus along the right PICC line in the right axillary vein demonstrated.  3.  Occlusive thrombus in the superficial right cephalic vein  demonstrated as above.  4.  No left-sided DVT or superficial thrombus identified.      Final Pathology/Culture Result:   Surgical Pathology 7/20:  A. Native heart:   - Severe coronary artery disease with more than 90% occlusions   - Multifocal fibrosis and scarring consistent with remote healed   myocardial infarction   - Myocardial hypertrophy might with myocyte the lysis   - Multifocal replacement fibrosis and focal fatty metaplasia        - Atrioventricular node and ventricular septum junction with   replacement and pericellular fibrosis     B. Gross only, ICD generator and 1 lead:   - Medical hardware as described grossly     Endomyocardial biopsy 7/27:  0R/ no AMR    Catheter tip culture 7/17: No growth         Consultations:   1. OT/PT  2. Cardiology  3. Hematology  4. Endocrinology  5. Infectious disease         Medications Prior to Admission:     Facility-Administered Medications Prior to Admission   Medication Dose Route Frequency Provider Last Rate Last Dose     [DISCONTINUED]  erythromycin (ROMYCIN) ophthalmic ointment   Left Eye TID Triny Bunch MD         [DISCONTINUED] lidocaine (PF) (XYLOCAINE) 2 % injection 10 mL  10 mL Other Once Triny Bunch MD         Medications Prior to Admission   Medication Sig Dispense Refill Last Dose     blood glucose (ACCU-CHEK SMARTVIEW) test strip USE TO TEST THREE TIMES DAILY AS DIRECTED 100 strip 0      blood glucose (NO BRAND SPECIFIED) test strip Use to test blood sugar 2 times daily or as directed. 200 strip 3      blood glucose monitoring (ACCU-CHEK FELIPE SMARTVIEW) meter device kit Use to test blood sugar 3-4 times daily, as directed. 1 kit 0      blood glucose monitoring (ONE TOUCH DELICA) lancets Use to test blood sugars 2 times daily. 200 each 3      insulin pen needle (B-D U/F) 31G X 5 MM miscellaneous 1 Units by Device route 2 times daily Use 2 pen needles daily or as directed. 100 each 1      order for DME Auto CPAP 8-15 cmH20 1 Units 0      [DISCONTINUED] aspirin 81 MG tablet Take 1 tablet (81 mg) by mouth daily 30 tablet 11      [DISCONTINUED] atorvastatin (LIPITOR) 40 MG tablet Take 1 tablet (40 mg) by mouth daily 90 tablet 2      [DISCONTINUED] clopidogrel (PLAVIX) 75 MG tablet Take 1 tablet (75 mg) by mouth daily 90 tablet 3      [DISCONTINUED] exenatide ER (BYDUREON) 2 MG pen Inject 2 mg Subcutaneous every 7 days 4 each 3      [DISCONTINUED] furosemide (LASIX) 20 MG tablet Take 2 tablets (40 mg) by mouth 2 times daily 180 tablet 3      [DISCONTINUED] glimepiride (AMARYL) 4 MG tablet Take 1 tablet (4 mg) by mouth every morning (before breakfast) 90 tablet 3      [DISCONTINUED] hydrALAZINE (APRESOLINE) 25 MG tablet Take 1 tablet (25 mg) by mouth 3 times daily 270 tablet 3      [DISCONTINUED] insulin glargine (LANTUS SOLOSTAR) 100 UNIT/ML pen Take 8 units in the morning and 40 units in the evening 45 mL 3      [DISCONTINUED] isosorbide mononitrate (IMDUR) 60 MG 24 hr tablet Take 1 tablet (60 mg) by mouth daily 90  tablet 3      [DISCONTINUED] losartan (COZAAR) 50 MG tablet Take 1 tablet (50 mg) by mouth daily 90 tablet 3      [DISCONTINUED] nitroglycerin (NITROSTAT) 0.4 MG SL tablet Place 1 tablet (0.4 mg) under the tongue every 5 minutes as needed for chest pain If you are still having symptoms after 3 doses (15 minutes) call 911. 10 tablet 0           Discharge Medications:      Lucian Morillo Sr.   Home Medication Instructions CECILY:[id number]    Printed on:08/03/20 6682   Medication Information                      acetaminophen (TYLENOL) 325 MG tablet  Take 3 tablets (975 mg) by mouth every 8 hours as needed for mild pain             Alcohol Swabs PADS  Use to swab the area of the injection or sergio as directed   Per insurance coverage             aspirin (ASA) 81 MG chewable tablet  Take 1 tablet (81 mg) by mouth daily             blood glucose (ACCU-CHEK SMARTVIEW) test strip  USE TO TEST THREE TIMES DAILY AS DIRECTED             blood glucose (NO BRAND SPECIFIED) test strip  Use to test blood sugar 2 times daily or as directed.             blood glucose monitoring (ACCU-CHEK FELIPE SMARTVIEW) meter device kit  Use to test blood sugar 3-4 times daily, as directed.             blood glucose monitoring (ONE TOUCH DELICA) lancets  Use to test blood sugars 2 times daily.             calcium carbonate 600 mg-vitamin D 400 units (CALTRATE) 600-400 MG-UNIT per tablet  Take 1 tablet by mouth 2 times daily (with meals)             fondaparinux ANTICOAGULANT (ARIXTRA) 7.5MG/0.6ML injection  Inject 0.6 mLs (7.5 mg) Subcutaneous every 24 hours Take on Tuesday 8/4 at 10 am, then start taking fondaparinux shot daily at 8 am after that (starting 8/5). Hold dose on morning of your right heart cath and biopsies             furosemide (LASIX) 20 MG tablet  Take 40 mg (2 tabs) by mouth in the am, 20 mg (1 tab) by mouth in the afternoon             glimepiride (AMARYL) 2 MG tablet  Take 3 tablets (6 mg) by mouth daily (with  breakfast)             hydrALAZINE (APRESOLINE) 25 MG tablet  Take 3 tablets (75 mg) by mouth every 8 hours             insulin aspart (NOVOLOG PEN) 100 UNIT/ML pen  Inject 2-16 Units Subcutaneous 3 times daily (with meals) Correction Scale - custom DOSING     Do Not give Correction Insulin if Pre-Meal BG less than 140     -169 give 2 units.  -199 give 4 units.  -229 give 6 units.  -259 give 8 units.  -289 give 10 units.  -319 give 12 units.  -349 give 14 units.  BG >/= 350 give 16 units.        To be given with prandial insulin, and based on pre-meal blood glucose.   Notify provider if glucose greater than or equal 350 mg/dL after administration. If given at mealtime, administer within 30 minutes of start of meal             insulin isophane human (HUMULIN N PEN) 100 UNIT/ML injection  Inject 35 Units Subcutaneous every morning             insulin pen needle (32G X 4 MM) 32G X 4 MM miscellaneous  Use as directed by provider  Per insurance coverage             insulin pen needle (B-D U/F) 31G X 5 MM miscellaneous  1 Units by Device route 2 times daily Use 2 pen needles daily or as directed.             methocarbamol (ROBAXIN) 500 MG tablet  1 tablet (500 mg) by Oral or Feeding Tube route 4 times daily as needed for muscle spasms             mycophenolate (GENERIC EQUIVALENT) 250 MG capsule  Take 6 capsules (1,500 mg) by mouth 2 times daily             nystatin (MYCOSTATIN) 505851 UNIT/ML suspension  Swish and swallow 10 mLs (1,000,000 Units) in mouth 4 times daily             order for DME  Auto CPAP 8-15 cmH20             pantoprazole (PROTONIX) 40 MG EC tablet  Take 1 tablet (40 mg) by mouth every morning (before breakfast)             polyethylene glycol (MIRALAX) 17 g packet  17 g by Oral or Feeding Tube route daily             predniSONE (DELTASONE) 10 MG tablet  Take 2 tablets (20 mg) by mouth 2 times daily             rosuvastatin (CRESTOR) 10 MG tablet  Take 1 tablet  (10 mg) by mouth daily             senna-docusate (SENOKOT-S/PERICOLACE) 8.6-50 MG tablet  Take 1 tablet by mouth 2 times daily as needed for constipation             sitagliptin (JANUVIA) 100 MG tablet  Take 1 tablet (100 mg) by mouth daily             sulfamethoxazole-trimethoprim (BACTRIM) 400-80 MG tablet  Take 1 tablet by mouth daily             tacrolimus (GENERIC EQUIVALENT) 0.5 MG capsule  Dose at discharge will be 4 mg PO BID. These tablets can be used in combination with 1 mg tabs to equal total daily dose             tacrolimus (GENERIC EQUIVALENT) 1 MG capsule  Take 4 capsules (4 mg) by mouth 2 times daily             tamsulosin (FLOMAX) 0.4 MG capsule  Take 1 capsule (0.4 mg) by mouth daily             valGANciclovir (VALCYTE) 450 MG tablet  Take 1 tablet (450 mg) by mouth daily                      Discharge Instructions and Follow-Up:   Avoid lifting anything greater than ten pounds for 6 weeks after surgery and then less than 20 pounds for an additional 6 weeks.    No driving for 4 weeks after surgery or while on pain medication. If they had a minimally invasive procedure, they may drive after three weeks if not taking pain medication.      Avoid strenuous activities such as bowling, vacuuming, raking, shoveling, golf or tennis for 12 weeks after your surgery. Splint chest incision by hugging a pillow or bringing arms across chest when coughing or sneezing. Avoid pushing off with arms when getting up for the first month if sternum was opened.    Shower or wash incisions daily with soap and water (or as instructed), pat dry. Watch for signs of infection: increased redness, tenderness, warmth or any drainage that appears infected (pus like) or is persistent.  Also a temperature > 100.5 F or chills. Call surgeon or primary care provider's office immediately. Remove any skin glue left on incisions after 10 days. This will not affect the incision and can speed up healing.    Avoid sitting for prolonged  periods of time, try to walk every hour during the day. If patient has a leg incision, patient should elevate leg often when not walking.    Check weight when arriving at home from the hospital and continue to check it daily through recovery for at least a month. Patient instructed to call with any weight gain more than 3 pounds overnight or 5 pounds in a week.     We recommend using stool softeners while using narcotics for pain.      DENTAL VISITS AFTER SURGERY  If a heart valve was repaired or replaced, we do not recommend having any dental work done for 6 months and we recommend taking an antibiotic prior to dental visits from now on.  Patient is to notify their dentist before any procedure for the proper treatment needed. The antibiotic is taken by mouth one hour prior to visit. This includes routine cleanings.    POST-OPERATIVE CLINIC VISITS  Follow-up to be arranged by patient's heart transplant coordinator       [facility]   Cardiothoracic Surgery  Office phone: [phone]  Office fax: [phone]       CC:Suyapa Villaseñor Christie

## 2020-08-03 NOTE — PLAN OF CARE
Discharge 1600  D:Pt is s/p heart transplant. Pt has PMH  of end-stage ischemic cardiomyopathy, CAD s/p CABG 2008, DMT2, who was admitted to [facility] 07/03 for heart failure exacerbation with cardiogenic shock, underwent right subclavian IABP and then heart transplant.  I/A: Pt is doing very well. Pt is up independently. Pt's incisions healing well. Pt is eating and drinking, voiding and has had BM. Pt has been in NSR, VSS. Pt's lungs clear, sating well on RA. Pt's PICC removed. Pt instructed on all discharge medications, incisional cares, diet activity and follow up appointments. Pt verbalized understanding by repeating elaborating on information presented. Pt instructed on signs and symptoms to monitor for and what needed to be recorded daily. Pt's wife present during discharge and also verbalized understanding.. Pt escorted to discharge pharmacy and retrieved discharge medications and escorted to vehicle with all belongings safely.  P:Pt discharged to home safely with all belongings without questions or complaints.

## 2020-08-03 NOTE — PROGRESS NOTES
Diabetes Consult Daily  Progress Note          Assessment/Plan:    Lucian Henderson Sr. is a 64 year old male with a PMH of end-stage ischemic cardiomyopathy, CAD s/p CABG 2008, DMT2, who was admitted to [facility] 07/03 for heart failure exacerbation with cardiogenic shock, underwent right subclavian IABP insertion 07/16 with Dr. Sparrow, and then heart transplant 07/20/2020 with Dr. Griselli. 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  At home carbohydrate intake is described to be less and may be more consistent.    Type 2 diabetes:  Plan  - RNs to please record carb intake  - NPH 40 units qAM  starting (8/1/2020)-- 20 units this morning for NPO--> add an extra 15 units with resumption of eating around noon.  35 units qAm starting tomorrow.  -NPH 5 units qPM --> discontinue evening dose given tight Bg control and potential for taper evening prednisone dose  - continue glimepiride 6 mg every morning  - sitagliptin 100 mg qAM  - continue aspart correction 2 per 30 mg/dL aAC, to continue at home        (Prednisone currently 20 mg BID, may decrease to 20 + 15 soon)  Discharge Plan: ( plan is in AVS, and updated today)  NPH 35 units at 8 am with prednione  Glimepiride 6 mg with breakfast  sitagliptin 100 mg daily  test blood sugars before meals and use   Novolog correction scale before meals only (none at bedtime)  Do Not give Correction Insulin if Pre-Meal BG less than 140    -169 give 2 units.    -199 give 4 units.    -229 give 6 units.    -259 give 8 units.    -289 give 10 units.    -319 give 12 units.    -349 give 14 units.    BG >/= 350 give 16 units.            Please order at discharge:  Januvia  Amaryl  Humulin N pens  Novolog pens  Pen needles 4mm x32 thao  Alcohol wipes        Outpatient diabetes follow  up: Recommend video visit 2-7 days p discharge.  Msg p clin coord HIGH urgency to schedule, recommend follow-up with Marisabel Prieto PA-C ( she initially saw in the hospital and knows pt from clinic)-- message sent again to clinic coordinator 8/3.  Will also update Ms Prieto via staff message     Plan discussed with patient and wife,  primary team.  Discharge: this afternoon           Interval History:   The last 24 hours progress and nursing notes reviewed.  Next RHC on 8/3/2020, steroid taper per cardiology protocol   To discharge this afternoon.  Prednisone may tape to 20 + 15 once biopsy results known.    Carbs ordered from room service 8/1 and 8/2--  grams per meal.  Most meals shared with wife.  They estimate each food was shared about equally.  Lucian tired after RHC this morning.  Had energy yesterday.  Back pain getting better.  Anxious to go home last week, now today there is some level of reservation, maybe due to his fatigue.  He read the treatment plan again and the sliding scale.  Says it is easy to add the scale and oky to have another insulin type.  They will save the lantus at home, for it will likely be used in the future once prednisone tapered to lower/lowest level.  At home, activity expected to increase.  Discussed impact on glucose levels.  Reviewed BG target.  Notably, pt tolerated getting his glimpiride and lower dose NPH while NPO.      Interview in English and Samoan.  Pt declines phone .          Steroids:  Prednisone 20 mg twice daily    - test claim for NPH =  $8.95/month      Recent Labs   Lab 08/03/20  0749 08/03/20  0559 08/02/20  2223 08/02/20  1758 08/02/20  1210 08/02/20  0819 08/02/20  0641 08/01/20  2210  08/01/20  0702  07/31/20  0628  07/30/20  0456  07/29/20  0655   GLC  --  104*  --   --   --   --  99  --   --  120*  --  85  --  87  --  167*   *  --  168* 241* 307* 93  --  227*   < >  --    < >  --    < >  --    < >  --     < > = values in this  "interval not displayed.               Review of Systems:   See interval hx          Medications:     Orders Placed This Encounter      NPO per Anesthesia Guidelines for Procedure/Surgery Except for: Meds       Physical Exam:  Gen: Alert, up in chair, NAD , wife at bedside  HEENT: NC/AT, mucous membranes are moist  Resp: non-labored  Ext: moving all extremities  Neuro:oriented x3, communicating clearly  BP (!) 147/77 (BP Location: Left arm)   Pulse 101   Temp 98.6  F (37  C) (Oral)   Resp 16   Ht 1.626 m (5' 4\")   Wt 80.5 kg (177 lb 6.4 oz)   SpO2 97%   BMI 30.45 kg/m             Data:     Lab Results   Component Value Date    A1C 8.2 07/03/2020    A1C 7.9 07/08/2019    A1C 8.5 03/11/2019    A1C 7.6 10/16/2018    A1C 9.8 09/06/2017            Recent Labs   Lab Test 08/03/20  0559 08/02/20  0641    137   POTASSIUM 4.4 4.4   CHLORIDE 102 105   CO2 28 26   ANIONGAP 5 6   * 99   BUN 43* 37*   CR 1.45* 1.50*   BRYANNA 8.2* 8.2*     GFR Estimate   Date Value Ref Range Status   08/03/2020 50 (L) >60 mL/min/[1.73_m2] Final     Comment:     Non  GFR Calc  Starting 12/18/2018, serum creatinine based estimated GFR (eGFR) will be   calculated using the Chronic Kidney Disease Epidemiology Collaboration   (CKD-EPI) equation.     08/02/2020 48 (L) >60 mL/min/[1.73_m2] Final     Comment:     Non  GFR Calc  Starting 12/18/2018, serum creatinine based estimated GFR (eGFR) will be   calculated using the Chronic Kidney Disease Epidemiology Collaboration   (CKD-EPI) equation.     08/01/2020 49 (L) >60 mL/min/[1.73_m2] Final     Comment:     Non  GFR Calc  Starting 12/18/2018, serum creatinine based estimated GFR (eGFR) will be   calculated using the Chronic Kidney Disease Epidemiology Collaboration   (CKD-EPI) equation.       GFR Estimate If Black   Date Value Ref Range Status   08/03/2020 57 (L) >60 mL/min/[1.73_m2] Final     Comment:      GFR Calc  Starting " 12/18/2018, serum creatinine based estimated GFR (eGFR) will be   calculated using the Chronic Kidney Disease Epidemiology Collaboration   (CKD-EPI) equation.     08/02/2020 55 (L) >60 mL/min/[1.73_m2] Final     Comment:      GFR Calc  Starting 12/18/2018, serum creatinine based estimated GFR (eGFR) will be   calculated using the Chronic Kidney Disease Epidemiology Collaboration   (CKD-EPI) equation.     08/01/2020 56 (L) >60 mL/min/[1.73_m2] Final     Comment:      GFR Calc  Starting 12/18/2018, serum creatinine based estimated GFR (eGFR) will be   calculated using the Chronic Kidney Disease Epidemiology Collaboration   (CKD-EPI) equation.         CBC RESULTS:   Recent Labs   Lab Test 08/03/20  0559   WBC 11.1*   RBC 2.81*   HGB 8.5*   HCT 26.8*   MCV 95   MCH 30.2   MCHC 31.7   RDW 17.3*      Liyah Damon APRN -[id number]    Diabetes Management job code 0243  I spent a total of 40 minutes bedside and on the inpatient unit managing the glycemic care of Lucian Henderson Sr.. Over 50% of my time on the unit was spent counseling the patient and wife and/or coordinating care regarding discharge diabetes plan.  See note for details.

## 2020-08-04 ENCOUNTER — APPOINTMENT (OUTPATIENT)
Dept: INTERPRETER SERVICES | Facility: CLINIC | Age: 65
End: 2020-08-04
Payer: COMMERCIAL

## 2020-08-04 ENCOUNTER — TELEPHONE (OUTPATIENT)
Dept: TRANSPLANT | Facility: CLINIC | Age: 65
End: 2020-08-04

## 2020-08-04 ENCOUNTER — TELEPHONE (OUTPATIENT)
Dept: ENDOCRINOLOGY | Facility: CLINIC | Age: 65
End: 2020-08-04

## 2020-08-04 ENCOUNTER — TELEPHONE (OUTPATIENT)
Dept: FAMILY MEDICINE | Facility: CLINIC | Age: 65
End: 2020-08-04

## 2020-08-04 ENCOUNTER — PATIENT OUTREACH (OUTPATIENT)
Dept: CARE COORDINATION | Facility: CLINIC | Age: 65
End: 2020-08-04

## 2020-08-04 DIAGNOSIS — Z94.1 HEART TRANSPLANT, ORTHOTOPIC, STATUS (H): ICD-10-CM

## 2020-08-04 DIAGNOSIS — Z94.1 S/P ORTHOTOPIC HEART TRANSPLANT (H): ICD-10-CM

## 2020-08-04 LAB
COPATH REPORT: NORMAL
DONOR IDENTIFICATION: NORMAL
DSA COMMENTS: NORMAL
DSA PRESENT: NO
DSA TEST METHOD: NORMAL
ORGAN: NORMAL
SA1 CELL: NORMAL
SA1 COMMENTS: NORMAL
SA1 HI RISK ABY: NORMAL
SA1 MOD RISK ABY: NORMAL
SA1 TEST METHOD: NORMAL
SA2 CELL: NORMAL
SA2 COMMENTS: NORMAL
SA2 HI RISK ABY UA: NORMAL
SA2 MOD RISK ABY: NORMAL
SA2 TEST METHOD: NORMAL
UNACCEPTABLE ANTIGEN: NORMAL
UNOS CPRA: 0

## 2020-08-04 RX ORDER — PREDNISONE 10 MG/1
TABLET ORAL
Qty: 180 TABLET | Refills: 1 | Status: SHIPPED | OUTPATIENT
Start: 2020-08-04 | End: 2020-08-12

## 2020-08-04 NOTE — TELEPHONE ENCOUNTER
This patient was discharged from [facility]  on 08/03/2020.    Discharge Diagnosis:Heart Failure Exacerbation, Chf (Congestive Heart Failure) (H), Heart Transplant, Orthotopic, Status (H)    Follow-up instructions: Follow-up to be arranged by patient's heart transplant coordinator     A follow-up visit has not been scheduled.      Please follow-up with patient.

## 2020-08-04 NOTE — TELEPHONE ENCOUNTER
Called pt's wife, Shivani, using  for post-discharge check in.  Per Shivani, pt is doing well.  A little tired, but feeling good.  They have all of their medications and understand how to take them.  Reviewed again, how to reach coordinator day or night.    Also requested that pt have his Tacrolimus level checked tomorrow AM.  Appointment made at [facility] lab.  12 hour drug trough instructions reviewed again with Shivani.  She states understanding plan and instructions.  Will follow up with pt and wife with lab results.

## 2020-08-04 NOTE — TELEPHONE ENCOUNTER
Pt called using  to review biopsy results. Heart biopsy negative, Grade 0.  Pt to decrease his Prednisone dose to 20mg in the AM and 15mg in the PM.  Pt able to repeat instructions back to writer, and states understanding.  Pt's wife, Shivani, also listened to call.

## 2020-08-04 NOTE — PROGRESS NOTES
Clinic Care Coordination Contact    Situation: Patient chart reviewed by care coordinator.    Background: 64 year old male with heart transplant on 7/19/20 at [facility]    Assessment: Discharged to home with spouse and family     Plan/Recommendations: Patient will have close follow up with transplant team.    Primary care clinic care coordination is not indicated.    Demi Dunne RN, [facility] RN Coordinator  [facility], [facility]   8/4/2020    8:02 AM  [phone]

## 2020-08-04 NOTE — TELEPHONE ENCOUNTER
Follow ups have been scheduled,      Vicky Valladares RN  Triage Nurse  [facility]  Appointment line: [phone]  [facility], 24 hour nurse line, available by calling clinic at [phone] and following prompts.

## 2020-08-04 NOTE — PLAN OF CARE
Occupational Therapy Discharge Summary    Reason for therapy discharge:    Discharged to home with outpatient therapy.    Progress towards therapy goal(s). See goals on Care Plan in [facility] electronic health record for goal details.  Goals partially met.  Barriers to achieving goals:   discharge from facility.    Therapy recommendation(s):    Continued therapy is recommended.  Rationale/Recommendations:  to increase exercise endurance and activity tolerance.

## 2020-08-04 NOTE — TELEPHONE ENCOUNTER
----- Message from ELIJAH Foley CNS sent at 8/3/2020 11:29 AM CDT -----  Regarding: post-transplant, post-hospital follow up  Lucian Feliz is finally discharging today, on prednisone taper after heart transplant.  I believe Marisabel sent an appointment request on 7/24 or 7/25, but I am not seeing anything on his schedule.  Can you get him in with her end of this week?    Thank you,  Liyah

## 2020-08-04 NOTE — TELEPHONE ENCOUNTER
Pharmacy call checklist added and pharmacy notified.      Mariano Malhotra, DAPHNE.Ph.  [facility]  [phone] pager

## 2020-08-04 NOTE — TELEPHONE ENCOUNTER
To schedulers : please schedule with Marisabel Prieto  within the week. This could be a virtual visit.      Bridget Yancey MD  Endocrine triage

## 2020-08-05 ENCOUNTER — ALLIED HEALTH/NURSE VISIT (OUTPATIENT)
Dept: PHARMACY | Facility: CLINIC | Age: 65
End: 2020-08-05

## 2020-08-05 ENCOUNTER — TELEPHONE (OUTPATIENT)
Dept: TRANSPLANT | Facility: CLINIC | Age: 65
End: 2020-08-05

## 2020-08-05 ENCOUNTER — APPOINTMENT (OUTPATIENT)
Dept: INTERPRETER SERVICES | Facility: CLINIC | Age: 65
End: 2020-08-05
Payer: COMMERCIAL

## 2020-08-05 DIAGNOSIS — R52 PAIN: ICD-10-CM

## 2020-08-05 DIAGNOSIS — Z94.1 HEART REPLACED BY TRANSPLANT (H): ICD-10-CM

## 2020-08-05 DIAGNOSIS — E78.5 HYPERLIPIDEMIA LDL GOAL <100: ICD-10-CM

## 2020-08-05 DIAGNOSIS — Z11.59 ENCOUNTER FOR SCREENING FOR OTHER VIRAL DISEASES: ICD-10-CM

## 2020-08-05 DIAGNOSIS — Z94.1 HEART TRANSPLANT, ORTHOTOPIC, STATUS (H): Primary | ICD-10-CM

## 2020-08-05 DIAGNOSIS — I50.22 CHRONIC SYSTOLIC HEART FAILURE (H): Chronic | ICD-10-CM

## 2020-08-05 LAB
ANION GAP SERPL CALCULATED.3IONS-SCNC: 9 MMOL/L (ref 3–14)
BUN SERPL-MCNC: 58 MG/DL (ref 7–30)
CALCIUM SERPL-MCNC: 8.4 MG/DL (ref 8.5–10.1)
CHLORIDE SERPL-SCNC: 101 MMOL/L (ref 94–109)
CO2 SERPL-SCNC: 26 MMOL/L (ref 20–32)
CREAT SERPL-MCNC: 1.7 MG/DL (ref 0.66–1.25)
ERYTHROCYTE [DISTWIDTH] IN BLOOD BY AUTOMATED COUNT: 17.1 % (ref 10–15)
GFR SERPL CREATININE-BSD FRML MDRD: 41 ML/MIN/{1.73_M2}
GLUCOSE SERPL-MCNC: 243 MG/DL (ref 70–99)
HCT VFR BLD AUTO: 26.3 % (ref 40–53)
HGB BLD-MCNC: 8.8 G/DL (ref 13.3–17.7)
MAGNESIUM SERPL-MCNC: 1.7 MG/DL (ref 1.6–2.3)
MCH RBC QN AUTO: 31.1 PG (ref 26.5–33)
MCHC RBC AUTO-ENTMCNC: 33.5 G/DL (ref 31.5–36.5)
MCV RBC AUTO: 93 FL (ref 78–100)
PHOSPHATE SERPL-MCNC: 3.3 MG/DL (ref 2.5–4.5)
PLATELET # BLD AUTO: 225 10E9/L (ref 150–450)
POTASSIUM SERPL-SCNC: 4.5 MMOL/L (ref 3.4–5.3)
RBC # BLD AUTO: 2.83 10E12/L (ref 4.4–5.9)
SODIUM SERPL-SCNC: 136 MMOL/L (ref 133–144)
TACROLIMUS BLD-MCNC: 6.8 UG/L (ref 5–15)
TME LAST DOSE: NORMAL H
TSH SERPL DL<=0.005 MIU/L-ACNC: 0.8 MU/L (ref 0.4–4)
WBC # BLD AUTO: 10.4 10E9/L (ref 4–11)

## 2020-08-05 PROCEDURE — 85027 COMPLETE CBC AUTOMATED: CPT | Performed by: INTERNAL MEDICINE

## 2020-08-05 PROCEDURE — 83735 ASSAY OF MAGNESIUM: CPT | Performed by: INTERNAL MEDICINE

## 2020-08-05 PROCEDURE — 84100 ASSAY OF PHOSPHORUS: CPT | Performed by: INTERNAL MEDICINE

## 2020-08-05 PROCEDURE — 99605 MTMS BY PHARM NP 15 MIN: CPT | Performed by: PHARMACIST

## 2020-08-05 PROCEDURE — 99607 MTMS BY PHARM ADDL 15 MIN: CPT | Performed by: PHARMACIST

## 2020-08-05 PROCEDURE — 80197 ASSAY OF TACROLIMUS: CPT | Performed by: INTERNAL MEDICINE

## 2020-08-05 PROCEDURE — 36415 COLL VENOUS BLD VENIPUNCTURE: CPT | Performed by: INTERNAL MEDICINE

## 2020-08-05 PROCEDURE — 84443 ASSAY THYROID STIM HORMONE: CPT | Performed by: INTERNAL MEDICINE

## 2020-08-05 PROCEDURE — 80048 BASIC METABOLIC PNL TOTAL CA: CPT | Performed by: INTERNAL MEDICINE

## 2020-08-05 NOTE — TELEPHONE ENCOUNTER
Called patient with an .  Unable to reach.  Call Patient's son as we have an Auth to Discuss PHI signed. Left a message for patient to call back and schedule.    Margie Lockhart

## 2020-08-05 NOTE — TELEPHONE ENCOUNTER
Patient Call: Voicemail  Date/Time: 8/5/20 / 9:38 am  Reason for call: Rocío from [facility] lab dept. Called regarding patient's blood draw this morning. Please call her back: [phone]

## 2020-08-05 NOTE — PROGRESS NOTES
MTM ENCOUNTER  SUBJECTIVE/OBJECTIVE:                           Lucian Henderson Sr. is a 64 year old male called for a transitions of care visit. He was discharged from [facility] on 08/03/20 for Heart transplant on 07/20/20.  This telephone visit was conducted with the use of a .  Patient's wife is also on the line.     Patient consented to a telehealth visit: yes  Telemedicine Visit Details  Type of service:  Telephone visit  Start Time: 12:30  End Time: 1:36  Originating Location (pt. Location): Home  Distant Location (provider location):  [facility]EAS[facility] [place]  Mode of Communication:  Telephone    Chief Complaint: none.  Personal Healthcare Goals: Get stronger.    Allergies/ADRs: Reviewed in EHR  Tobacco:  reports that he has never smoked. He has never used smokeless tobacco.  Alcohol: none  Caffeine: one cups/day of coffee.  Activity: none right now.   PMH: Reviewed in EHR    Medication Adherence/Access: no issues reported  Patient uses med box. Filled by wife.  Patient takes medications 4 time(s) per day.   Per patient, misses medication 0 times per week.   Medication barriers: none.   The patient fills medications at [place]: YES.    Heart Transplant:  Current immunosuppressants include TAC 4mg qAM & 4mg qPM, (confusion around how many pills equals 4 mg) prednisone 20 mg  qAM & 15 mg qPM and MMF 1500 mg qAM & 1500 mg qPM.  There was some confusion about how many pills equal 1500 mg.  Pt reports no side effects.  Aspirin 81 mg daily.  Reports no unusual bleeding/bruising symptoms.  Arixtra injects subcutaneously every morning.  Reports no difficulty with this.  Furosemide 40 mg in the morning, 20 mg in the afternoon.  Reports edema symptoms 2 days ago, however is using compression stockings and this is helping.  Hydralazine 25 mg tablets, is taking 3 tablets which equals 75 mg, every 8 hours (3 times daily).  There was some confusion about how many tablets  equal 75 mg.  Reports is measuring daily weights and it is stable at 177 pounds.  Transplant date: 07/19/20  Estimated Creatinine Clearance: 40.9 mL/min (A) (based on SCr of 1.7 mg/dL (H)).  CMV prophylaxis: CrCl 40 to 59 mL/minute: Valcyte 450 mg once daily. (can not find information on CMV donor/recipiant)  PCP prophylaxis: Bactrim s s daily for 6 months post txp  Antifungal Prophylaxis: Nystatin until off steroids.   PPI use: Pantoprazole 40 mg by mouth in the morning.  Reports no upset stomach/acid reflux.  Current supplements for electrolyte replacement: none .  Tx Coordinator: Dhruv Lin MD:Arvin, Using Med Card: Yes, Lab Frequency:weekly  Recent Infections:  None.  Recent Hospitalizations: this hospitalization.  Home BPs: We will start monitoring this.  Skin Care: Will use sunscreen  Date last dentist appt: Unknown.  Immunizations: annual flu shot unkown; Csoertmifx89:  unknown; Prevnar 13: unknown; TDaP:  unknown; Shingrix: unknown    Pain: Reports taking Tylenol 975 mg, as needed.  Reports did not need any today.  Reports has not needed any methocarbamol 500 mg.  Reports pain is stable.    Hyperlipidemia: Current therapy includes Rosuvastatin 10 mg once daily.  Pt reports no significant myalgias or other side effects.  The ASCVD Risk score ([place] ABBY Jr., et al., 2013) failed to calculate for the following reasons:    The valid total cholesterol range is 130 to 320 mg/dL  Recent Labs   Lab Test 07/08/19  1021 08/16/18  0834  06/27/15  0755 01/11/15  1033   CHOL 104 91   < > 82 203*   HDL 27* 25*   < > 32* 34*   LDL 41 7   < > 31 Cannot estimate LDL when triglyceride exceeds 400 mg/dL  107   TRIG 181* 297*   < > 99 519*   CHOLHDLRATIO  --   --   --  2.6 6.0*    < > = values in this interval not displayed.         ASSESSMENT:                              Medication Adherence: good, no issues identified.  Unable to complete diabetes assessment due to time constraint.    Transplant: Tacrolimus dose  today, at 6.8, is low.  Tacrolimus dose may need to be increased.  There seems to be quite a bit of confusion about how many pills to take to equal a proper dose of tacrolimus, mycophenolate, prednisone, furosemide.  We spent a great deal of time reviewing this in detail.  I will ask Negrito to review this again on 8/11/20. Patient following up with transplant, and  On 08/11/20 will follow up with Negrito Rai Pharm.D.  Patient will need a .  Please have extra time set aside due to phone call taking extra time using .  I could not find any information as to CMV viremia status/donor/recipient, for valganciclovir duration either 3 or 6 months.    Pain: Stable.  Appears pain is managed well.    Hyperlipidemia: Stable.      PLAN:                          Post Discharge Medication Reconciliation Status: discharge medications reconciled and changed, per note/orders.    1. Tacrolimus dose may need to be increased (looks like this was just increased this morning (08/06/20).    2.  At follow-up with Negrito, please again review how many tablets equal certain doses.    3.  Will valganciclovir's duration be 3 or 6 months, as could not find information on CMV viremia status/donor/recipient.    I spent 66 minutes with this patient today. I offer these suggestions with the understanding that I don't fully understand Lucian's past medical history and the complexity of his health conditions. Lucian should make no changes without the approval of his physician. A copy of the visit note was provided to the patient's transplant team/provider.    Patient will follow-up with Negrito Rai, Pharm.D.    The patient declined a summary of these recommendations.     Doretha Sin, [facility] Pharmacist.   [phone]

## 2020-08-05 NOTE — TELEPHONE ENCOUNTER
Provider Call: General    Reason for call: [facility], had pt drawn there today and needs to speak w/you.    Call back needed? Yes    Return Call Needed  Same as documented in contacts section  When to return call?: Same day: Route High Priority

## 2020-08-06 ENCOUNTER — APPOINTMENT (OUTPATIENT)
Dept: INTERPRETER SERVICES | Facility: CLINIC | Age: 65
End: 2020-08-06
Payer: COMMERCIAL

## 2020-08-06 ENCOUNTER — TELEPHONE (OUTPATIENT)
Dept: TRANSPLANT | Facility: CLINIC | Age: 65
End: 2020-08-06

## 2020-08-06 ENCOUNTER — TELEPHONE (OUTPATIENT)
Dept: CARDIOLOGY | Facility: CLINIC | Age: 65
End: 2020-08-06

## 2020-08-06 ENCOUNTER — PRE VISIT (OUTPATIENT)
Dept: TRANSPLANT | Facility: CLINIC | Age: 65
End: 2020-08-06

## 2020-08-06 DIAGNOSIS — Z94.1 HEART TRANSPLANT, ORTHOTOPIC, STATUS (H): Primary | ICD-10-CM

## 2020-08-06 DIAGNOSIS — Z94.1 HEART REPLACED BY TRANSPLANT (H): ICD-10-CM

## 2020-08-06 DIAGNOSIS — Z94.1 HEART TRANSPLANT, ORTHOTOPIC, STATUS (H): ICD-10-CM

## 2020-08-06 RX ORDER — TACROLIMUS 1 MG/1
CAPSULE ORAL
Qty: 240 CAPSULE | Refills: 3 | Status: SHIPPED | OUTPATIENT
Start: 2020-08-06 | End: 2020-08-13

## 2020-08-06 RX ORDER — TACROLIMUS 0.5 MG/1
CAPSULE ORAL
Qty: 90 CAPSULE | Refills: 1 | Status: SHIPPED | OUTPATIENT
Start: 2020-08-06 | End: 2020-08-13

## 2020-08-06 RX ORDER — LIDOCAINE 40 MG/G
CREAM TOPICAL
Status: CANCELLED | OUTPATIENT
Start: 2020-08-06

## 2020-08-06 NOTE — TELEPHONE ENCOUNTER
Call complete for pre procedure reminder, travel screen and updated visitor policy.  Per heart transplant criteria, Covid test not done.

## 2020-08-06 NOTE — TELEPHONE ENCOUNTER
Pt called using , and labs reviewed.  Tacrolimus level 6.8.  Goal 10-12.  Dose increased to 4.5mg BID.  Pt to recheck level at visit on Monday.  Pt also reminded NOT to take his Arixtra injection on Monday morning before his heart cath.  Pt states understanding all instructions and plan of care.

## 2020-08-07 ENCOUNTER — APPOINTMENT (OUTPATIENT)
Dept: INTERPRETER SERVICES | Facility: CLINIC | Age: 65
End: 2020-08-07
Payer: COMMERCIAL

## 2020-08-09 PROCEDURE — 99207 ZZC NO BILLABLE SERVICE THIS VISIT: CPT | Performed by: PHYSICIAN ASSISTANT

## 2020-08-10 ENCOUNTER — APPOINTMENT (OUTPATIENT)
Dept: MEDSURG UNIT | Facility: CLINIC | Age: 65
End: 2020-08-10
Attending: INTERNAL MEDICINE
Payer: COMMERCIAL

## 2020-08-10 ENCOUNTER — APPOINTMENT (OUTPATIENT)
Dept: LAB | Facility: CLINIC | Age: 65
End: 2020-08-10
Attending: INTERNAL MEDICINE
Payer: COMMERCIAL

## 2020-08-10 ENCOUNTER — HOSPITAL ENCOUNTER (OUTPATIENT)
Facility: CLINIC | Age: 65
Discharge: HOME | End: 2020-08-10
Attending: INTERNAL MEDICINE | Admitting: INTERNAL MEDICINE
Payer: COMMERCIAL

## 2020-08-10 VITALS
RESPIRATION RATE: 16 BRPM | BODY MASS INDEX: 29.32 KG/M2 | TEMPERATURE: 98.4 F | WEIGHT: 176 LBS | SYSTOLIC BLOOD PRESSURE: 139 MMHG | DIASTOLIC BLOOD PRESSURE: 56 MMHG | HEIGHT: 65 IN | OXYGEN SATURATION: 95 %

## 2020-08-10 DIAGNOSIS — Z94.1 HEART REPLACED BY TRANSPLANT (H): ICD-10-CM

## 2020-08-10 DIAGNOSIS — Z94.1 HEART TRANSPLANT, ORTHOTOPIC, STATUS (H): ICD-10-CM

## 2020-08-10 LAB
ANION GAP SERPL CALCULATED.3IONS-SCNC: 6 MMOL/L (ref 3–14)
BUN SERPL-MCNC: 68 MG/DL (ref 7–30)
CALCIUM SERPL-MCNC: 8.5 MG/DL (ref 8.5–10.1)
CHLORIDE SERPL-SCNC: 101 MMOL/L (ref 94–109)
CO2 SERPL-SCNC: 26 MMOL/L (ref 20–32)
CREAT SERPL-MCNC: 1.77 MG/DL (ref 0.66–1.25)
ERYTHROCYTE [DISTWIDTH] IN BLOOD BY AUTOMATED COUNT: 19.3 % (ref 10–15)
GFR SERPL CREATININE-BSD FRML MDRD: 39 ML/MIN/{1.73_M2}
GLUCOSE SERPL-MCNC: 124 MG/DL (ref 70–99)
HCT VFR BLD AUTO: 24.3 % (ref 40–53)
HGB BLD-MCNC: 7.7 G/DL (ref 13.3–17.7)
MAGNESIUM SERPL-MCNC: 1.9 MG/DL (ref 1.6–2.3)
MCH RBC QN AUTO: 30.4 PG (ref 26.5–33)
MCHC RBC AUTO-ENTMCNC: 31.7 G/DL (ref 31.5–36.5)
MCV RBC AUTO: 96 FL (ref 78–100)
PHOSPHATE SERPL-MCNC: 3.2 MG/DL (ref 2.5–4.5)
PLATELET # BLD AUTO: 149 10E9/L (ref 150–450)
POTASSIUM SERPL-SCNC: 4.6 MMOL/L (ref 3.4–5.3)
RBC # BLD AUTO: 2.53 10E12/L (ref 4.4–5.9)
SODIUM SERPL-SCNC: 133 MMOL/L (ref 133–144)
TACROLIMUS BLD-MCNC: 7.9 UG/L (ref 5–15)
TME LAST DOSE: NORMAL H
WBC # BLD AUTO: 7.7 10E9/L (ref 4–11)

## 2020-08-10 PROCEDURE — 36415 COLL VENOUS BLD VENIPUNCTURE: CPT | Performed by: INTERNAL MEDICINE

## 2020-08-10 PROCEDURE — 84100 ASSAY OF PHOSPHORUS: CPT | Performed by: INTERNAL MEDICINE

## 2020-08-10 PROCEDURE — 93505 ENDOMYOCARDIAL BIOPSY: CPT | Performed by: INTERNAL MEDICINE

## 2020-08-10 PROCEDURE — 83735 ASSAY OF MAGNESIUM: CPT | Performed by: INTERNAL MEDICINE

## 2020-08-10 PROCEDURE — 80197 ASSAY OF TACROLIMUS: CPT | Performed by: INTERNAL MEDICINE

## 2020-08-10 PROCEDURE — 40000166 ZZH STATISTIC PP CARE STAGE 1

## 2020-08-10 PROCEDURE — 25000125 ZZHC RX 250: Performed by: INTERNAL MEDICINE

## 2020-08-10 PROCEDURE — C1894 INTRO/SHEATH, NON-LASER: HCPCS | Performed by: INTERNAL MEDICINE

## 2020-08-10 PROCEDURE — 80048 BASIC METABOLIC PNL TOTAL CA: CPT | Performed by: INTERNAL MEDICINE

## 2020-08-10 PROCEDURE — 88350 IMFLUOR EA ADDL 1ANTB STN PX: CPT | Performed by: INTERNAL MEDICINE

## 2020-08-10 PROCEDURE — 88346 IMFLUOR 1ST 1ANTB STAIN PX: CPT | Performed by: INTERNAL MEDICINE

## 2020-08-10 PROCEDURE — 85027 COMPLETE CBC AUTOMATED: CPT | Performed by: INTERNAL MEDICINE

## 2020-08-10 PROCEDURE — 27210794 ZZH OR GENERAL SUPPLY STERILE: Performed by: INTERNAL MEDICINE

## 2020-08-10 PROCEDURE — 93505 ENDOMYOCARDIAL BIOPSY: CPT | Mod: 26 | Performed by: INTERNAL MEDICINE

## 2020-08-10 PROCEDURE — 88307 TISSUE EXAM BY PATHOLOGIST: CPT | Performed by: INTERNAL MEDICINE

## 2020-08-10 RX ORDER — LIDOCAINE 40 MG/G
CREAM TOPICAL
Status: COMPLETED | OUTPATIENT
Start: 2020-08-10 | End: 2020-08-10

## 2020-08-10 RX ADMIN — LIDOCAINE: 40 CREAM TOPICAL at 09:45

## 2020-08-10 ASSESSMENT — MIFFLIN-ST. JEOR: SCORE: 1505.21

## 2020-08-10 NOTE — IP AVS SNAPSHOT
[address] [address]                                    After Visit Summary   8/10/2020    Lucian Henderson     MRN: [id number]           After Visit Summary Signature Page    I have received my discharge instructions, and my questions have been answered. I have discussed any challenges I see with this plan with the nurse or doctor.    ..........................................................................................................................................  Patient/Patient Representative Signature      ..........................................................................................................................................  Patient Representative Print Name and Relationship to Patient    ..................................................               ................................................  Date                                   Time    ..........................................................................................................................................  Reviewed by Signature/Title    ...................................................              ..............................................  Date                                               Time          22EPIC Rev 08/18

## 2020-08-10 NOTE — TELEPHONE ENCOUNTER
Was able to reach patient with an  on 8/10.  Scheduled video visit for 3:00 on Thursday 8/14.    Margie Lockhart

## 2020-08-10 NOTE — PROGRESS NOTES
Returned from CCL s/p RHC.  VSS.  Left internal jugular site CDI.  Denies pain.  Left neck not swollen.  Reviewed discharge instructions with patient and his wife.  Discharged to home with wife.

## 2020-08-10 NOTE — IP AVS SNAPSHOT
MRN:[id number]                      After Visit Summary   8/10/2020    Lucian Henderson Sr.    MRN: [id number]           Visit Information        Department      8/10/2020  8:27 AM [address] [facility]          Review of your medicines      UNREVIEWED medicines. Ask your doctor about these medicines       Dose / Directions   acetaminophen 325 MG tablet  Commonly known as:  TYLENOL  Used for:  Heart transplant, orthotopic, status (H)      Dose:  975 mg  Take 3 tablets (975 mg) by mouth every 8 hours as needed for mild pain  Quantity:  60 tablet  Refills:  0     aspirin 81 MG chewable tablet  Commonly known as:  ASA  Used for:  Heart transplant, orthotopic, status (H)      Dose:  81 mg  Take 1 tablet (81 mg) by mouth daily  Quantity:  30 tablet  Refills:  1     calcium carbonate 600 mg-vitamin D 400 units 600-400 MG-UNIT per tablet  Commonly known as:  CALTRATE  Used for:  Heart transplant, orthotopic, status (H)      Dose:  1 tablet  Take 1 tablet by mouth 2 times daily (with meals)  Quantity:  60 tablet  Refills:  1     fondaparinux ANTICOAGULANT 7.5MG/0.6ML injection  Commonly known as:  ARIXTRA  Used for:  HIT (heparin-induced thrombocytopenia) (H)      Dose:  7.5 mg  Inject 0.6 mLs (7.5 mg) Subcutaneous every 24 hours Take on Tuesday 8/4 at 10 am, then start taking fondaparinux shot daily at 8 am after that (starting 8/5). Hold dose on morning of your right heart cath and biopsies  Quantity:  90 Syringe  Refills:  3     furosemide 20 MG tablet  Commonly known as:  LASIX  Used for:  Heart transplant, orthotopic, status (H)      Take 40 mg (2 tabs) by mouth in the am, 20 mg (1 tab) by mouth in the afternoon  Quantity:  90 tablet  Refills:  1     glimepiride 2 MG tablet  Commonly known as:  AMARYL  Used for:  Heart transplant, orthotopic, status (H)      Dose:  6 mg  Take 3 tablets (6 mg) by mouth daily (with breakfast)  Quantity:  90 tablet  Refills:  1     hydrALAZINE 25 MG  tablet  Commonly known as:  APRESOLINE  Used for:  Heart transplant, orthotopic, status (H)      Dose:  75 mg  Take 3 tablets (75 mg) by mouth every 8 hours  Quantity:  270 tablet  Refills:  1     insulin aspart 100 UNIT/ML pen  Commonly known as:  NovoLOG PEN  Used for:  Heart transplant, orthotopic, status (H)      Dose:  2-16 Units  Inject 2-16 Units Subcutaneous 3 times daily (with meals) Correction Scale - custom DOSING     Do Not give Correction Insulin if Pre-Meal BG less than 140     -169 give 2 units.  -199 give 4 units.  -229 give 6 units.  -259 give 8 units.  -289 give 10 units.  -319 give 12 units.  -349 give 14 units.  BG >/= 350 give 16 units.        To be given with prandial insulin, and based on pre-meal blood glucose.   Notify provider if glucose greater than or equal 350 mg/dL after administration. If given at mealtime, administer within 30 minutes of start of meal  Quantity:  9 mL  Refills:  1     insulin isophane human 100 UNIT/ML injection  Commonly known as:  HumuLIN N PEN  Used for:  Heart transplant, orthotopic, status (H)      Dose:  35 Units  Inject 35 Units Subcutaneous every morning  Quantity:  9 mL  Refills:  1     methocarbamol 500 MG tablet  Commonly known as:  ROBAXIN  Used for:  Heart transplant, orthotopic, status (H)      Dose:  500 mg  1 tablet (500 mg) by Oral or Feeding Tube route 4 times daily as needed for muscle spasms  Quantity:  30 tablet  Refills:  1     mycophenolate 250 MG capsule  Commonly known as:  GENERIC EQUIVALENT  Used for:  Heart transplant, orthotopic, status (H)      Dose:  1,500 mg  Take 6 capsules (1,500 mg) by mouth 2 times daily  Quantity:  360 capsule  Refills:  1     nystatin [id number] UNIT/ML suspension  Commonly known as:  MYCOSTATIN  Used for:  Heart transplant, orthotopic, status (H)      Dose:  1,000,000 Units  Swish and swallow 10 mLs (1,000,000 Units) in mouth 4 times daily  Quantity:  800 mL  Refills:  1      pantoprazole 40 MG EC tablet  Commonly known as:  PROTONIX  Used for:  Heart transplant, orthotopic, status (H)      Dose:  40 mg  Take 1 tablet (40 mg) by mouth every morning (before breakfast)  Quantity:  30 tablet  Refills:  1     polyethylene glycol 17 g packet  Commonly known as:  MIRALAX  Used for:  Heart transplant, orthotopic, status (H)      Dose:  17 g  17 g by Oral or Feeding Tube route daily  Quantity:  7 packet  Refills:  0     predniSONE 10 MG tablet  Commonly known as:  DELTASONE  Used for:  Heart transplant, orthotopic, status (H)      Take 2 tablets (20 mg) by mouth every morning AND 1.5 tablets (15 mg) every evening.  Quantity:  180 tablet  Refills:  1     rosuvastatin 10 MG tablet  Commonly known as:  CRESTOR  Used for:  Heart transplant, orthotopic, status (H)      Dose:  10 mg  Take 1 tablet (10 mg) by mouth daily  Quantity:  30 tablet  Refills:  0     senna-docusate 8.6-50 MG tablet  Commonly known as:  SENOKOT-S/PERICOLACE  Used for:  Heart transplant, orthotopic, status (H)      Dose:  1 tablet  Take 1 tablet by mouth 2 times daily as needed for constipation  Quantity:  50 tablet  Refills:  0     sitagliptin 100 MG tablet  Commonly known as:  JANUVIA  Used for:  Heart transplant, orthotopic, status (H)      Dose:  100 mg  Take 1 tablet (100 mg) by mouth daily  Quantity:  30 tablet  Refills:  1     sulfamethoxazole-trimethoprim 400-80 MG tablet  Commonly known as:  BACTRIM  Indication:  Preventative Medication Therapy Used Around Surgery  Used for:  Heart transplant, orthotopic, status (H)      Dose:  1 tablet  Take 1 tablet by mouth daily  Quantity:  30 tablet  Refills:  1     * tacrolimus 1 MG capsule  Commonly known as:  GENERIC EQUIVALENT  Used for:  Heart transplant, orthotopic, status (H)      Take 4 capsules WITH one 0.5mg capsule to equal 4.5mg twice a day.  Quantity:  240 capsule  Refills:  3     * tacrolimus 0.5 MG capsule  Commonly known as:  GENERIC EQUIVALENT  Used for:  Heart  transplant, orthotopic, status (H)      Take one 0.5mg capsule with four 1mg capsules to equal 4.5mg twice a day.  Quantity:  90 capsule  Refills:  1     tamsulosin 0.4 MG capsule  Commonly known as:  FLOMAX  Used for:  Heart transplant, orthotopic, status (H)      Dose:  0.4 mg  Take 1 capsule (0.4 mg) by mouth daily  Quantity:  30 capsule  Refills:  1     valGANciclovir 450 MG tablet  Commonly known as:  VALCYTE  Indication:  Medication Treatment to Prevent Cytomegalovirus Disease  Used for:  Heart transplant, orthotopic, status (H)      Dose:  450 mg  Take 1 tablet (450 mg) by mouth daily  Quantity:  30 tablet  Refills:  1         * This list has 2 medication(s) that are the same as other medications prescribed for you. Read the directions carefully, and ask your doctor or other care provider to review them with you.            CONTINUE these medicines which have NOT CHANGED       Dose / Directions   Alcohol Swabs Pads  Used for:  Heart transplant, orthotopic, status (H)      Use to swab the area of the injection or sergio as directed   Per insurance coverage  Quantity:  100 each  Refills:  0     blood glucose monitoring lancets  Used for:  Type 2 diabetes mellitus with diabetic nephropathy, with long-term current use of insulin (H)      Use to test blood sugars 2 times daily.  Quantity:  200 each  Refills:  3     blood glucose monitoring meter device kit  Used for:  Type 2 diabetes mellitus with diabetic nephropathy, with long-term current use of insulin (H)      Use to test blood sugar 3-4 times daily, as directed.  Quantity:  1 kit  Refills:  0     * blood glucose test strip  Commonly known as:  Accu-Chek SmartView  Used for:  Type 2 diabetes mellitus with diabetic nephropathy, with long-term current use of insulin (H)      USE TO TEST THREE TIMES DAILY AS DIRECTED  Quantity:  100 strip  Refills:  0     * blood glucose test strip  Commonly known as:  NO BRAND SPECIFIED  Used for:  Type 2 diabetes mellitus with  diabetic nephropathy, with long-term current use of insulin (H)      Use to test blood sugar 2 times daily or as directed.  Quantity:  200 strip  Refills:  3     * insulin pen needle 31G X 5 MM miscellaneous  Commonly known as:  B-D U/F  Used for:  Type 2 diabetes mellitus with diabetic nephropathy, with long-term current use of insulin (H)      Dose:  1 Units  1 Units by Device route 2 times daily Use 2 pen needles daily or as directed.  Quantity:  100 each  Refills:  1     * insulin pen needle 32G X 4 MM miscellaneous  Commonly known as:  32G X 4 MM  Used for:  Heart transplant, orthotopic, status (H)      Use as directed by provider  Per insurance coverage  Quantity:  100 each  Refills:  0     order for DME      Auto CPAP 8-15 cmH20  Quantity:  1 Units  Refills:  0         * This list has 4 medication(s) that are the same as other medications prescribed for you. Read the directions carefully, and ask your doctor or other care provider to review them with you.                  Protect others around you: Learn how to safely use, store and throw away your medicines at www.disposemymeds.org.       Follow-ups after your visit       Your next 10 appointments already scheduled    Aug 11, 2020  1:30 PM CDT  (Arrive by 1:15 PM)  Office Visit with Negrito Rai [facility] Medication Therapy Management ([facility]) [address]  [address]rd [facility] [address] [phone]   Bring a current list of meds and any records pertaining to this visit.  For Physicals, please bring immunization records and any forms needing to be filled out. Please arrive 10 minutes early to complete paperwork.     Aug 13, 2020 10:00 AM CDT  Cardiac Evaluation with  Cardiac Rehab 1  [facility] [place], Cardiac Rehabilitation ([facility]) [facility] [address] 1[address]  [facility] [id number] [phone]      Aug 17, 2020   7:00 AM CDT  LAB with UU LAB GOLD WAITING  [facility], Lab ([facility], [facility]) [address] [address]   Please do not eat 10-12 hours before your appointment if you are coming in fasting for labs on lipids, cholesterol, or glucose (sugar). Does not apply to pregnant women. Water, tea and black coffee (with nothing added) is okay. Do not drink other fluids, diet soda or gum. If you have concerns about taking your medications, please send a message by clicking on Secure Messaging, Message Your Care Team.     Aug 17, 2020  7:30 AM CDT  Echo Complete with UUECHR2  [facility], Cardiac Services ([facility], [facility]) [address] [address] [phone]   1. Please bring or wear a comfortable two-piece outfit.  2. You may eat, drink and take your normal medicines.  3. Please do not apply perfumes or lotions on the day of your exam.   4. For any questions that cannot be answered, please contact the ordering physician     Aug 17, 2020  8:40 AM CDT  XR CHEST 2 VIEWS with UUXR1  [facility],  Radiology ([facility], [facility]) [address] [address]  [phone]   How do I prepare for my exam? (Food and drink instructions)  No Food and Drink Restrictions.    How do I prepare for my exam? (Other instructions)  You do not need to do anything special for this exam.    What should I wear: Wear comfortable clothes.    How long does the exam take: Most scans take less than 5 minutes.    What should I bring: Bring a list of your medicines, including vitamins, minerals and over-the-counter drugs. It is safest to leave personal items at home.    Do I need a :  No  is needed.    What do I need to tell my doctor: Tell your doctor if there s any chance you are pregnant.    What should I do after the  exam: No restrictions, You may resume normal activities.    What is this test: An image of a specific body part shown in shades of black and white.    Who should I call with questions: If you have any questions, please call the Imaging Department where you will have your exam. Directions, parking instructions, and other information is available on our website, [email or web address].     Aug 17, 2020 10:00 AM CDT  Procedure 1.5 hr with U2A ROOM 7  [address]A [facility] ([facility]) [address] [address]      Aug 17, 2020  Pro[facility] with Cardiologist MD Ron  [facility]  Heart Cath Lab ([facility]) [address] [address] [phone]   The [facility] is located on the corner of [facility] and [facility] on the [facility]. It is easily accessible from virtually any point in the [facility] area, via [address] and [address].   Aug 31, 2020  8:30 AM CDT  [facility] ([facility]) [address] [place] [address]   Please do not eat 10-12 hours before your appointment if you are coming in fasting for labs on lipids, cholesterol, or glucose (sugar). Does not apply to pregnant women. Water, tea and black coffee (with nothing added) is okay. Do not drink other fluids, diet soda or gum. If you have concerns about taking your medications, please send a message by clicking on Secure Messaging, Message Your Care Team.     Aug 31, 2020  9:00 AM CDT  Procedure - 2.5 hour with U2A ROOM 2  [address] [facility] ([facility]) [address] [address]      Aug 31, 2020  Procedure with Sonny Saleh MD  [facility],  [facility] ([facility]) [address] [facility] [address]  [phone]   The [facility] is located on the corner of [facility] and [facility] on the [facility]. It is easily accessible from virtually any point in the [place] area, via [address] and [address].      Care Instructions       Further instructions from your care team       [facility]                        Interventional Cardiology  Discharge Instructions   Post Right Heart Cath      AFTER YOU GO HOME:    DO drink plenty of fluids    DO resume your regular diet and medications unless otherwise instructed by your Primary Physician    Do Not scrub the procedure site vigorously    No lotion or powder to the puncture site for 3 days    CALL YOUR PRIMARY PHYSICIAN IF: You may resume all normal activity.  Monitor neck site for bleeding, swelling, or voice changes. If you notice bleeding or swelling immediately apply pressure to the site and call number below to speak with Cardiology Fellow.  If you experience any changes in your breathing you should call your doctor immediately or come to the closest Emergency Department.  Do not drive yourself.    ADDITIONAL INSTRUCTIONS: Medications: You are to resume all home medications including anticoagulation therapy unless otherwise advised by your primary cardiologist or nurse coordinator.    Follow Up: Per your primary cardiology team    If you have any questions or concerns regarding your procedure site please call [phone] at anytime and ask for Cardiology Fellow on call.  They are available 24 hours a day.  You may also contact the Cardiology Clinic after hours number at [phone].                                                       Telephone Numbers [phone] Monday-Friday 8:00 am to 4:30 pm    [phone] After 4:30 pm Monday-Friday, Weekends & Holidays  Ask for Interventional Cardiologist on  "call. Someone is on call 24 hours/day   [facility] toll free number [phone] Monday-Friday 8:00 am to 4:30 pm   [facility] Emergency Dept [phone]                   Additional Information About Your Visit       MyChart Information    [facility] gives you secure access to your electronic health record. If you see a primary care provider, you can also send messages to your care team and make appointments. If you have questions, please call your primary care clinic.  If you do not have a primary care provider, please call [phone] and they will assist you.       Care EveryWhere ID    This is your Care EveryWhere ID. This could be used by other organizations to access your [facility] medical records  [id number]       Your Vitals Were  Most recent update: 8/10/2020 10:29 AM    Blood Pressure   113/55 (BP Location: Right arm)          Temperature   98.4  F (36.9  C) (Oral)          Respirations   16          Height   1.651 m (5' 5\")          Weight   79.8 kg (176 lb)             Pulse Oximetry   98%    BMI (Body Mass Index)   29.29 kg/m           Primary Care Provider Office Phone # Fax #    Suyapa Hatfield [phone]      Equal Access to Services    MARIBETH RUSH AH: Hadii aad ku hadasho Soomaali, waaxda luqadaha, qaybta kaalmada adeegyada, joana rubioin hayerneston arpita flynn lashawn pulido. So [facility] [phone].    ATENCIÓN: Si habla español, tiene a lainez disposición servicios gratuitos de asistencia lingüística. Llame al [phone].    We comply with applicable federal and state civil rights laws, including the [place] Human Rights Act. We do not discriminate on the basis of race, color, creed, Faith, national origin, marital status, age, disability, sex, sexual orientation, or gender identity.       Thank you!    Thank you for choosing [facility] for your care. Our goal is always to provide you with excellent care. Hearing back from our patients is one way we can continue to improve our services. Please take " a few minutes to complete the written survey that you may receive in the mail after you visit with us. Thank you!            Medication List      Medications          Morning Afternoon Evening Bedtime As Needed    Alcohol Swabs Pads  INSTRUCTIONS:  Use to swab the area of the injection or sergio as directed   Per insurance coverage                     blood glucose monitoring lancets  INSTRUCTIONS:  Use to test blood sugars 2 times daily.                     blood glucose monitoring meter device kit  INSTRUCTIONS:  Use to test blood sugar 3-4 times daily, as directed.                     * blood glucose test strip  Also known as:  Accu-Chek SmartView  INSTRUCTIONS:  USE TO TEST THREE TIMES DAILY AS DIRECTED  Doctor's comments:  Due for appointment for further refills, please give reminder.                     * blood glucose test strip  Also known as:  NO BRAND SPECIFIED  INSTRUCTIONS:  Use to test blood sugar 2 times daily or as directed.                     * insulin pen needle 31G X 5 MM miscellaneous  Also known as:  B-D U/F  INSTRUCTIONS:  1 Units by Device route 2 times daily Use 2 pen needles daily or as directed.                     * insulin pen needle 32G X 4 MM miscellaneous  Also known as:  32G X 4 MM  INSTRUCTIONS:  Use as directed by provider  Per insurance coverage                     order for DME  INSTRUCTIONS:  Auto CPAP 8-15 cmH20                        * This list has 4 medication(s) that are the same as other medications prescribed for you. Read the directions carefully, and ask your doctor or other care provider to review them with you.            ASK your doctor about these medications          Morning Afternoon Evening Bedtime As Needed    acetaminophen 325 MG tablet  Also known as:  TYLENOL  INSTRUCTIONS:  Take 3 tablets (975 mg) by mouth every 8 hours as needed for mild pain                     aspirin 81 MG chewable tablet  Also known as:  ASA  INSTRUCTIONS:  Take 1 tablet (81 mg) by mouth  daily                     calcium carbonate 600 mg-vitamin D 400 units 600-400 MG-UNIT per tablet  Also known as:  CALTRATE  INSTRUCTIONS:  Take 1 tablet by mouth 2 times daily (with meals)                     fondaparinux ANTICOAGULANT 7.5MG/0.6ML injection  Also known as:  ARIXTRA  INSTRUCTIONS:  Inject 0.6 mLs (7.5 mg) Subcutaneous every 24 hours Take on Tuesday 8/4 at 10 am, then start taking fondaparinux shot daily at 8 am after that (starting 8/5). Hold dose on morning of your right heart cath and biopsies                     furosemide 20 MG tablet  Also known as:  LASIX  INSTRUCTIONS:  Take 40 mg (2 tabs) by mouth in the am, 20 mg (1 tab) by mouth in the afternoon                     glimepiride 2 MG tablet  Also known as:  AMARYL  INSTRUCTIONS:  Take 3 tablets (6 mg) by mouth daily (with breakfast)                     hydrALAZINE 25 MG tablet  Also known as:  APRESOLINE  INSTRUCTIONS:  Take 3 tablets (75 mg) by mouth every 8 hours                     insulin aspart 100 UNIT/ML pen  Also known as:  NovoLOG PEN  INSTRUCTIONS:  Inject 2-16 Units Subcutaneous 3 times daily (with meals) Correction Scale - custom DOSING     Do Not give Correction Insulin if Pre-Meal BG less than 140     -169 give 2 units.  -199 give 4 units.  -229 give 6 units.  -259 give 8 units.  -289 give 10 units.  -319 give 12 units.  -349 give 14 units.  BG >/= 350 give 16 units.        To be given with prandial insulin, and based on pre-meal blood glucose.   Notify provider if glucose greater than or equal 350 mg/dL after administration. If given at mealtime, administer within 30 minutes of start of meal                     insulin isophane human 100 UNIT/ML injection  Also known as:  HumuLIN N PEN  INSTRUCTIONS:  Inject 35 Units Subcutaneous every morning                     methocarbamol 500 MG tablet  Also known as:  ROBAXIN  INSTRUCTIONS:  1 tablet (500 mg) by Oral or Feeding Tube route 4 times  daily as needed for muscle spasms                     mycophenolate 250 MG capsule  Also known as:  GENERIC EQUIVALENT  INSTRUCTIONS:  Take 6 capsules (1,500 mg) by mouth 2 times daily                     nystatin [id number] UNIT/ML suspension  Also known as:  MYCOSTATIN  INSTRUCTIONS:  Swish and swallow 10 mLs (1,000,000 Units) in mouth 4 times daily                     pantoprazole 40 MG EC tablet  Also known as:  PROTONIX  INSTRUCTIONS:  Take 1 tablet (40 mg) by mouth every morning (before breakfast)                     polyethylene glycol 17 g packet  Also known as:  MIRALAX  INSTRUCTIONS:  17 g by Oral or Feeding Tube route daily                     predniSONE 10 MG tablet  Also known as:  DELTASONE  INSTRUCTIONS:  Take 2 tablets (20 mg) by mouth every morning AND 1.5 tablets (15 mg) every evening.  Doctor's comments:  TXP DT 7/19/2020 (Heart) TXP Dischg DT  DX Heart transplant Z94.1 TX [facility] ([facility] [place])                     rosuvastatin 10 MG tablet  Also known as:  CRESTOR  INSTRUCTIONS:  Take 1 tablet (10 mg) by mouth daily                     senna-docusate 8.6-50 MG tablet  Also known as:  SENOKOT-S/PERICOLACE  INSTRUCTIONS:  Take 1 tablet by mouth 2 times daily as needed for constipation                     sitagliptin 100 MG tablet  Also known as:  JANUVIA  INSTRUCTIONS:  Take 1 tablet (100 mg) by mouth daily                     sulfamethoxazole-trimethoprim 400-80 MG tablet  Also known as:  BACTRIM  INSTRUCTIONS:  Take 1 tablet by mouth daily  Reason for med:  Preventative Medication Therapy Used Around Surgery                     * tacrolimus 1 MG capsule  Also known as:  GENERIC EQUIVALENT  INSTRUCTIONS:  Take 4 capsules WITH one 0.5mg capsule to equal 4.5mg twice a day.  Doctor's comments:  TXP DT 7/19/2020 (Heart) TXP Dischg DT Heart transplant Z94.1 TX [facility] [place] [facility]                     * tacrolimus 0.5 MG capsule  Also known as:  GENERIC EQUIVALENT  INSTRUCTIONS:  Take one  0.5mg capsule with four 1mg capsules to equal 4.5mg twice a day.  Doctor's comments:  TXP DT 7/19/2020 (Heart) TXP DX Heart replaced by transplant Z94.1 [place] ([place], [place])                     tamsulosin 0.4 MG capsule  Also known as:  FLOMAX  INSTRUCTIONS:  Take 1 capsule (0.4 mg) by mouth daily                     valGANciclovir 450 MG tablet  Also known as:  VALCYTE  INSTRUCTIONS:  Take 1 tablet (450 mg) by mouth daily  Reason for med:  Medication Treatment to Prevent Cytomegalovirus Disease                        * This list has 2 medication(s) that are the same as other medications prescribed for you. Read the directions carefully, and ask your doctor or other care provider to review them with you.

## 2020-08-10 NOTE — PROGRESS NOTES
Arrived from home for a RHC and biopsy.  VSS.  Denies pain.  H&Pcurrent.  Consent obtained.  PA from transplant team evaluated patient prior to procedure.  Ready for RHC.

## 2020-08-10 NOTE — PROGRESS NOTES
[facility]   Interventional Cardiology        Consenting/Education for Cardiac Cath Lab Procedure: Right Heart Catheterization and Cardiac Biopsy     Patient understands we would like to perform .Right Heart Catheterization and Cardiac Biopsy due to Heart Trasnplant. This procedure will be performed by Dr. Burr.    The patient understands the following:     Right Heart Catheterization: A fine tube (catheter) is put into the vein of the groin/neck.  It is carefully passed along until it reaches the heart and then goes up into the blood vessels of the lungs. This is done to measure a variety of pressures in your heart and can tell us how well the heart is filling and emptying, as well as monitor fluid status.  and Endomyocardial Biopsy: A catheter is placed in your neck/groin vein, a 'Biopsy forceps' or pincers at the end of the catheter are used to take the tissue samples. This is may be repeated to get enough samples. The biopsy pieces are very small (one to two millimeters).     No sedation is planned for this procedure. Patient also understands risks and complications of the procedure which include, but are not limited to bruising/swelling around the incision site, infection, bleeding, allergic reaction to local anesthetic, air embolism, arterial puncture, stroke, heart attack.       Patient verbalized understanding of risks and benefits and has elected to proceed with the procedure or procedures listed above.    Maria Elena Cazares PA-C  [facility] Interventional Cardiology  [phone]

## 2020-08-10 NOTE — CONSULTS
ADULT HEART TRANSPLANT CLINIC    HPI:   Mr. Tee Henderson is a 64 year old male w/ICM now s/p OHT 7/20/2020 c/b donor strep salvarius bacteremia, CAD s/p CABG in 2008, T2DM, HIT, RUE DVT, urinary retention who presents to clinic today for routine heart transplant follow-up.    He had primary graft dysfunction with POD1 ECHO EF of 20-25% and severe RV dysfunction thought to be 2/2 prolonged ischemic time. By POD6, ECHO was normal. Since that time, he has continued to improve. He had an incidental pericardial effusion on ECHO 7/27, improved on repeat ECHO 7/29. He completed a 7 day course of antibiotics for his donor bacteremia. Hi also had HIT with RIJ clot, on fondaparinux which he will hold morning of RHC and biopsies.    He has been feeling very well, no complaints. He is walking a good distance at home, feels like he is getting stronger.  Patient denies fever, chills, night sweats, oral lesions, rashes, lightheadedness, dizziness, headaches, chest pain, palpitations, nausea, vomiting, diarrhea.     Cardiac Medications  ASA 81 mg daily  Fondaparinux  MMF 1500 BID  Prenisone 20/15  Tac 4.5 mg BID, last increase 8/6  Valcyte 450 mg daily  Nystatin 1 mil QID  Crestor 10  Bactrim 400-80 daily  Lasix 40/20  Hydral 75 mg q8h    PAST MEDICAL HISTORY:  Past Medical History:   Diagnosis Date     CAD (coronary artery disease)      CHF (congestive heart failure) (H)      CKD (chronic kidney disease), stage III (H)      Cortical cataract of both eyes      Diabetes (H)      Hyperlipidemia      Hypertension      Ischemic cardiomyopathy      Obesity      CHANTEL (obstructive sleep apnea)     occas cpap     Osteoarthritis        FAMILY HISTORY:  Family History   Problem Relation Age of Onset     Diabetes Brother      Diabetes Sister      Diabetes Sister      Macular Degeneration No family hx of      Glaucoma No family hx of      Myocardial Infarction No family hx of      Kidney Disease No family hx of        SOCIAL HISTORY:  Social  History     Socioeconomic History     Marital status:      Spouse name: Not on file     Number of children: 4     Years of education: Not on file     Highest education level: Not on file   Occupational History     Occupation:      Employer: [employer]     Employer: RETIRED   Social Needs     Financial resource strain: Not on file     Food insecurity     Worry: Not on file     Inability: Not on file     Transportation needs     Medical: Not on file     Non-medical: Not on file   Tobacco Use     Smoking status: Never Smoker     Smokeless tobacco: Never Used     Tobacco comment: Never smoked; non-smoking household   Substance and Sexual Activity     Alcohol use: No     Alcohol/week: 0.0 standard drinks     Drug use: No     Sexual activity: Yes     Partners: Female   Lifestyle     Physical activity     Days per week: Not on file     Minutes per session: Not on file     Stress: Not on file   Relationships     Social connections     Talks on phone: Not on file     Gets together: Not on file     Attends Restoration service: Not on file     Active member of club or organization: Not on file     Attends meetings of clubs or organizations: Not on file     Relationship status: Not on file     Intimate partner violence     Fear of current or ex partner: Not on file     Emotionally abused: Not on file     Physically abused: Not on file     Forced sexual activity: Not on file   Other Topics Concern     Parent/sibling w/ CABG, MI or angioplasty before 65F 55M? No   Social History Narrative     Not on file       CURRENT MEDICATIONS:  No current facility-administered medications on file prior to encounter.   acetaminophen (TYLENOL) 325 MG tablet, Take 3 tablets (975 mg) by mouth every 8 hours as needed for mild pain (Patient not taking: Reported on 8/6/2020)  Alcohol Swabs PADS, Use to swab the area of the injection or sergio as directed   Per insurance coverage  aspirin (ASA) 81 MG chewable tablet, Take 1  tablet (81 mg) by mouth daily  blood glucose (ACCU-CHEK SMARTVIEW) test strip, USE TO TEST THREE TIMES DAILY AS DIRECTED  blood glucose (NO BRAND SPECIFIED) test strip, Use to test blood sugar 2 times daily or as directed.  blood glucose monitoring (ACCU-CHEK FELIPE SMARTVIEW) meter device kit, Use to test blood sugar 3-4 times daily, as directed.  blood glucose monitoring (ONE TOUCH DELICA) lancets, Use to test blood sugars 2 times daily.  calcium carbonate 600 mg-vitamin D 400 units (CALTRATE) 600-400 MG-UNIT per tablet, Take 1 tablet by mouth 2 times daily (with meals)  fondaparinux ANTICOAGULANT (ARIXTRA) 7.5MG/0.6ML injection, Inject 0.6 mLs (7.5 mg) Subcutaneous every 24 hours Take on Tuesday 8/4 at 10 am, then start taking fondaparinux shot daily at 8 am after that (starting 8/5). Hold dose on morning of your right heart cath and biopsies  furosemide (LASIX) 20 MG tablet, Take 40 mg (2 tabs) by mouth in the am, 20 mg (1 tab) by mouth in the afternoon  glimepiride (AMARYL) 2 MG tablet, Take 3 tablets (6 mg) by mouth daily (with breakfast)  hydrALAZINE (APRESOLINE) 25 MG tablet, Take 3 tablets (75 mg) by mouth every 8 hours  insulin aspart (NOVOLOG PEN) 100 UNIT/ML pen, Inject 2-16 Units Subcutaneous 3 times daily (with meals) Correction Scale - custom DOSING     Do Not give Correction Insulin if Pre-Meal BG less than 140     -169 give 2 units.  -199 give 4 units.  -229 give 6 units.  -259 give 8 units.  -289 give 10 units.  -319 give 12 units.  -349 give 14 units.  BG >/= 350 give 16 units.        To be given with prandial insulin, and based on pre-meal blood glucose.   Notify provider if glucose greater than or equal 350 mg/dL after administration. If given at mealtime, administer within 30 minutes of start of meal  insulin isophane human (HUMULIN N PEN) 100 UNIT/ML injection, Inject 35 Units Subcutaneous every morning  insulin pen needle (32G X 4 MM) 32G X 4 MM  miscellaneous, Use as directed by provider  Per insurance coverage  insulin pen needle (B-D U/F) 31G X 5 MM miscellaneous, 1 Units by Device route 2 times daily Use 2 pen needles daily or as directed.  methocarbamol (ROBAXIN) 500 MG tablet, 1 tablet (500 mg) by Oral or Feeding Tube route 4 times daily as needed for muscle spasms  mycophenolate (GENERIC EQUIVALENT) 250 MG capsule, Take 6 capsules (1,500 mg) by mouth 2 times daily  nystatin (MYCOSTATIN) 765894 UNIT/ML suspension, Swish and swallow 10 mLs (1,000,000 Units) in mouth 4 times daily  order for DME, Auto CPAP 8-15 cmH20  pantoprazole (PROTONIX) 40 MG EC tablet, Take 1 tablet (40 mg) by mouth every morning (before breakfast)  polyethylene glycol (MIRALAX) 17 g packet, 17 g by Oral or Feeding Tube route daily  rosuvastatin (CRESTOR) 10 MG tablet, Take 1 tablet (10 mg) by mouth daily  senna-docusate (SENOKOT-S/PERICOLACE) 8.6-50 MG tablet, Take 1 tablet by mouth 2 times daily as needed for constipation  sitagliptin (JANUVIA) 100 MG tablet, Take 1 tablet (100 mg) by mouth daily  sulfamethoxazole-trimethoprim (BACTRIM) 400-80 MG tablet, Take 1 tablet by mouth daily  tamsulosin (FLOMAX) 0.4 MG capsule, Take 1 capsule (0.4 mg) by mouth daily  valGANciclovir (VALCYTE) 450 MG tablet, Take 1 tablet (450 mg) by mouth daily        ROS:  See HPI    EXAM:  /55 (BP Location: Right arm)   Temp 98.4  F (36.9  C) (Oral)   Resp 16   SpO2 98%     GENERAL: Appears comfortable, in no acute distress.   HEENT: Eye symmetrical, no discharge or icterus bilaterally. Mucous membranes moist and without lesions. No thrush.   CV: RRR, +S1S2, No murmur, rub, or gallop. JVP <6.   RESPIRATORY: Respirations regular, even, and unlabored. Lungs CTA throughout.   GI: Soft and non distended with normoactive bowel sounds present in all quadrants. No tenderness, rebound, guarding. No hepatomegaly.   EXTREMITIES: 3+ peripheral edema. 2+ bilateral pedal pulses.   NEUROLOGIC: Alert and  interacting appropiratly. No focal deficits.   MUSCULOSKELETAL: No joint swelling or tenderness.   SKIN: No jaundice. No rashes or lesions.       Labs - reviewed with patient in clinic today:  CBC RESULTS:  Lab Results   Component Value Date    WBC 7.7 08/10/2020    RBC 2.53 (L) 08/10/2020    HGB 7.7 (L) 08/10/2020    HCT 24.3 (L) 08/10/2020    MCV 96 08/10/2020    MCH 30.4 08/10/2020    MCHC 31.7 08/10/2020    RDW 19.3 (H) 08/10/2020     (L) 08/10/2020       CMP RESULTS:  Lab Results   Component Value Date     08/10/2020    POTASSIUM 4.6 08/10/2020    CHLORIDE 101 08/10/2020    CO2 26 08/10/2020    ANIONGAP 6 08/10/2020     (H) 08/10/2020    BUN 68 (H) 08/10/2020    CR 1.77 (H) 08/10/2020    GFRESTIMATED 39 (L) 08/10/2020    GFRESTBLACK 45 (L) 08/10/2020    BRYANNA 8.5 08/10/2020    BILITOTAL 0.4 07/25/2020    ALBUMIN 2.2 (L) 07/25/2020    ALKPHOS 82 07/25/2020    ALT 21 07/25/2020    AST 14 07/25/2020        INR RESULTS:  Lab Results   Component Value Date    INR 1.35 (H) 07/20/2020       LIPID RESULTS:  Lab Results   Component Value Date    CHOL 104 07/08/2019    HDL 27 (L) 07/08/2019    LDL 41 07/08/2019    TRIG 181 (H) 07/08/2019    CHOLHDLRATIO 2.6 06/27/2015       IMMUNOSUPPRESSANT LEVELS:  Lab Results   Component Value Date    TACROL 6.8 08/05/2020    DOSTAC  8.4.2020 @ 1950 08/05/2020       No components found for: CK  Lab Results   Component Value Date    MAG 1.9 08/10/2020     Lab Results   Component Value Date    A1C 8.2 (H) 07/03/2020     Lab Results   Component Value Date    PHOS 3.2 08/10/2020     Lab Results   Component Value Date    NTBNP 6,187 (H) 11/25/2019     Lab Results   Component Value Date    SAITESTMET SA FCS 07/31/2020    SAICELL Class I 07/31/2020    DV9FOOLDM None 07/31/2020    EA5AUEZYNA B:64 65 07/31/2020    SAIREPCOM  07/31/2020      Test performed by modified procedure. Serum heat inactivated and tested   by a modified ([place]) protocol including fetal calf serum  addition.   High-risk, mfi >3,000. Mod-risk, mfi 500-3,000.       Lab Results   Component Value Date    SAIITESTME SA FCS 07/31/2020    SAIICELL Class II 07/31/2020    KS1XFDIVO None 07/31/2020    BZ8IOYEZMQ None 07/31/2020    SAIIREPCOM  07/31/2020      Test performed by modified procedure. Serum heat inactivated and tested   by a modified ([place]) protocol including fetal calf serum addition.   High-risk, mfi >3,000. Mod-risk, mfi 500-3,000.       No results found for: CSPEC, CMQNT, CMLOG    Diagnostic Studies:  8/10/2020 RHC   Time  Systolic  Diastolic  Mean  A Wave  V Wave  EDP  Max dp/dt  HR     RA Pressures  11:36 AM    5 mmHg     7 mmHg     6 mmHg       94 bpm       RV Pressures  11:36 AM  23 mmHg         5 mmHg      94 bpm       PA Pressures  11:31 AM  23 mmHg     10 mmHg     18 mmHg         95 bpm       PCW Pressures  11:32 AM    7 mmHg     8 mmHg     8 mmHg       94 bpm            Time  TDCO  TDCI  Jacy C.O.  Jacy C.I.  Jacy HR    Cardiac Output Results  11:09 AM  5 L/min     2.67 L/min/m2           11:33 AM  5 L/min             7/29/2020 ECHO  Interpretation Summary  Trivial pericardial effusion is present.  A left pleural effusion is present.  Pericardial effusion size has decreased since previous study.  _____________________________________________________________________________  __        Left Ventricle  Global and regional left ventricular function is normal with an EF of 60-65%.     Right Ventricle  Right ventricular function, chamber size, wall motion, and thickness are  normal.    Assessment/Plan:  Mr. Tee Henderson is a 64 year old male w/ICM now s/p OHT 7/20/2020 c/b donor strep salvarius bacteremia, CAD s/p CABG in 2008, T2DM, HIT, RUE DVT, urinary retention who presents to clinic today for routine heart transplant follow-up.    He had primary graft dysfunction with POD1 ECHO EF of 20-25% and severe RV dysfunction thought to be 2/2 prolonged ischemic time. By POD6, ECHO was normal. Since that  time, he has continued to improve. He had an incidental pericardial effusion on ECHO 7/27, improved on repeat ECHO 7/29. He completed a 7 day course of antibiotics for his donor bacteremia. Hi also had HIT with RIJ clot, on fondaparinux which he will hold morning of RHC and biopsies.    Since going home he has been feeling well. Exercise tolerance is improving. No complaints or concerns. He is taking all of his medications as instructed. BP is at goal. HR is 90s off terbutaline. His Cr is increasing slightly as his is BUN, we will decrease his lasix given RA of 5 and PCW of 7, but likely tac is contributing as well.    # Status post OHT on 7/19/20, history of ICM  # Primary graft dysfunction, resolved  # Chronic immunosuppression  * TTE 7/29/20: LVEF 60-65%, RV normal, trivial pericardial effusion, left pleural effusion  * RHC 7/27/20: RA 9 PA 39/14(25) PCWP 15 Td CO 6.3  * RHC 8/3/20:  RA 6 PA 33/10(20) PCWP 12 Td CI 3.7  * RHC 8/10/2020: RA 5, PA 23/10(18), PCWP 7, TD CO 5.0 , TD CI 2.67     Graft Function:   --Volume: hypovolemic- decrease lasix from 40/20 to 40 on even days and 20 mg on odd days. Stop afternoon dose   --BP: At goal, continue hydralazine 75 mg tid  --HR: 90s, off terbutaline      Immunosuppression:   --CNI-delaying protocol with basiliximab on 7/19 and 7/24  --prednisone 20/15, continue taper with negative biopsies  --Cellcept 1500 mg BID  --tacrolimus trough 7.9 today (goal 10-12), increased to 4.5 three days ago. No dose change today, recheck Wednesday     Serostatus: CMV: D+/R+, EBV: D+/R+, Toxo: D+/R-     Prophylaxis:   --CAV: aspirin 81 mg and rosuvastatin 10 mg daily  --Thrush: Nystatin   --PCP: Bactrim single strength daily (lifelong given donor + for toxo)  --GI: Protonix   --Osteoporosis: calcium/vitamin D   --CMV:  Valcyte renally dosed 450 mg daily through October  --Toxo: D+/R- Bactrim lifelong      Routine Surveillance:   --week 1 7/27/20: negative  --week 2 8/3/20: negative  --DSAs  neg 7 7/31    Monitoring  - Given increasing Cr, will consider holding CORS/IVUS next week, will discuss with primary cardiologist     #Donor Streptococcus salivarius bacteremia  - transplant ID consult 7/21, treated with ceftriaxone. Course complete.      # Atrial Flutter with RVR pre-op  # Post op arrhythmia, resolved  - resolved with stopping terbutaline     # Thrombocytopenia  # HIT   - HIT antibody positive 7/19  - CORRINE positive, started bivalrudin 7/22, transitioned fondaparinux 7/29, HOLD MORNING OF BIOPSIES      # Right internal jugular and axillary vein DVT, non occlusive   # Occlusive thrombus in superficial right cephalic vein   # Asymmetric LE edema  RLE US negative for DVT 7/31.  - AC as above    # B/l Lower extremity edema  - Hypovolemic intravascularily, likely 3rd spacing, gradually improving         No pending labs at the time of this note.    Follow-up  - 1 week on 2a for RHC/Biopsy/Transplant visit, will consider IVUS/CORS  - Tac level on Wed/Thurs    25 minutes spent face-to-face with patient, >50% in counseling and/or coordination of care as described above      Maria Elena Cazares PA-C  8/10/2020          CC  SHONDA MERCHANT

## 2020-08-10 NOTE — TELEPHONE ENCOUNTER
Received forwarded message today. Returned call to  Rocío who could not recall what the issues were on 8/5. Appears pt had all his labs drawn appropriately. No further follow up needed.

## 2020-08-10 NOTE — Clinical Note
dry, intact, no bleeding and no hematoma. 7fr LIJ removed,manual pressure applied, hemostasis achieved, bandage placed.

## 2020-08-10 NOTE — DISCHARGE INSTRUCTIONS
[facility]                        Interventional Cardiology  Discharge Instructions   Post Right Heart Cath      AFTER YOU GO HOME:    DO drink plenty of fluids    DO resume your regular diet and medications unless otherwise instructed by your Primary Physician    Do Not scrub the procedure site vigorously    No lotion or powder to the puncture site for 3 days    CALL YOUR PRIMARY PHYSICIAN IF: You may resume all normal activity.  Monitor neck site for bleeding, swelling, or voice changes. If you notice bleeding or swelling immediately apply pressure to the site and call number below to speak with Cardiology Fellow.  If you experience any changes in your breathing you should call your doctor immediately or come to the closest Emergency Department.  Do not drive yourself.    ADDITIONAL INSTRUCTIONS: Medications: You are to resume all home medications including anticoagulation therapy unless otherwise advised by your primary cardiologist or nurse coordinator.    Follow Up: Per your primary cardiology team    If you have any questions or concerns regarding your procedure site please call [phone] at anytime and ask for Cardiology Fellow on call.  They are available 24 hours a day.  You may also contact the Cardiology Clinic after hours number at [phone].                                                       Telephone Numbers [phone] Monday-Friday 8:00 am to 4:30 pm    [phone] After 4:30 pm Monday-Friday, Weekends & Holidays  Ask for Interventional Cardiologist on call. Someone is on call 24 hours/day   [facility] toll free number [phone] Monday-Friday 8:00 am to 4:30 pm   [facility] Emergency Dept [phone]

## 2020-08-11 ENCOUNTER — APPOINTMENT (OUTPATIENT)
Dept: INTERPRETER SERVICES | Facility: CLINIC | Age: 65
End: 2020-08-11
Payer: COMMERCIAL

## 2020-08-11 ENCOUNTER — TELEPHONE (OUTPATIENT)
Dept: TRANSPLANT | Facility: CLINIC | Age: 65
End: 2020-08-11

## 2020-08-11 DIAGNOSIS — Z94.1 HEART TRANSPLANT, ORTHOTOPIC, STATUS (H): ICD-10-CM

## 2020-08-11 LAB — COPATH REPORT: NORMAL

## 2020-08-11 RX ORDER — FUROSEMIDE 20 MG
TABLET ORAL
Qty: 90 TABLET | Refills: 3 | Status: SHIPPED | OUTPATIENT
Start: 2020-08-11 | End: 2020-08-21

## 2020-08-11 NOTE — TELEPHONE ENCOUNTER
RECORDS RECEIVED FROM: internal    DATE RECEIVED: 8.13.20    NOTES (FOR ALL VISITS) STATUS DETAILS   OFFICE NOTES from referring provider RAYNA Pitts    OFFICE NOTES from other specialist Internal  Gregory SR     ED NOTES na    OPERATIVE REPORT  (thyroid, pituitary, adrenal, parathyroid) na    MEDICATION LIST Internal     IMAGING      DEXASCAN Internal  7.8.19   MRI (BRAIN) na    XR (Chest) internal 7,28,20, 7.27.20, 7.26.20, 7.25.20 more in epic    CT (HEAD/NECK/CHEST/ABDOMEN) Internal  7.9.19, 5.17.19,    NUCLEAR  na    ULTRASOUND (HEAD/NECK) na    LABS     DIABETES: HBGA1C, CREATININE, FASTING LIPIDS, MICROALBUMIN URINE, POTASSIUM, TSH, T4    THYROID: TSH, T4, CBC, THYRODLONULIN, TOTAL T3, FREE T4, CALCITONIN, CEA Internal  CREATININE 5.17.19  HBGA1C 7.3.20  FASTING  8.16.18  POTASSIUM 7.20.20  TSH/T4- 8.5,20

## 2020-08-12 ENCOUNTER — APPOINTMENT (OUTPATIENT)
Dept: INTERPRETER SERVICES | Facility: CLINIC | Age: 65
End: 2020-08-12
Payer: COMMERCIAL

## 2020-08-12 ENCOUNTER — TELEPHONE (OUTPATIENT)
Dept: TRANSPLANT | Facility: CLINIC | Age: 65
End: 2020-08-12

## 2020-08-12 DIAGNOSIS — Z94.1 HEART TRANSPLANT, ORTHOTOPIC, STATUS (H): ICD-10-CM

## 2020-08-12 RX ORDER — PREDNISONE 10 MG/1
TABLET ORAL
Qty: 180 TABLET | Refills: 1 | Status: SHIPPED | OUTPATIENT
Start: 2020-08-12 | End: 2020-08-19

## 2020-08-12 NOTE — TELEPHONE ENCOUNTER
Pt called this AM with post- biopsy results and instructions.  1.  Pt to decrease Lasix dose to once daily- 40mg on even days and 20mg on odd days.  2.  Pt to stop taking 81mg Aspirin while he is on blood thinner.  3.  Heart biopsy was negative- pt may decrease Prednisone dose to 15mg BID.  4.  Pt to have his Tacrolimus level rechecked tomorrow AM with 12 hour trough.  These instructions were called to pt and wife using  over the phone.  Pt and wife state understanding all instructions and plan of care.

## 2020-08-13 ENCOUNTER — PRE VISIT (OUTPATIENT)
Dept: ENDOCRINOLOGY | Facility: CLINIC | Age: 65
End: 2020-08-13

## 2020-08-13 ENCOUNTER — PRE VISIT (OUTPATIENT)
Dept: TRANSPLANT | Facility: CLINIC | Age: 65
End: 2020-08-13

## 2020-08-13 ENCOUNTER — TELEPHONE (OUTPATIENT)
Dept: TRANSPLANT | Facility: CLINIC | Age: 65
End: 2020-08-13

## 2020-08-13 ENCOUNTER — HOSPITAL ENCOUNTER (OUTPATIENT)
Dept: CARDIAC REHAB | Facility: CLINIC | Age: 65
End: 2020-08-13
Attending: SURGERY
Payer: COMMERCIAL

## 2020-08-13 ENCOUNTER — TELEPHONE (OUTPATIENT)
Dept: CARDIOLOGY | Facility: CLINIC | Age: 65
End: 2020-08-13

## 2020-08-13 DIAGNOSIS — Z94.1 HEART TRANSPLANT, ORTHOTOPIC, STATUS (H): Primary | ICD-10-CM

## 2020-08-13 DIAGNOSIS — Z94.1 HEART TRANSPLANT, ORTHOTOPIC, STATUS (H): ICD-10-CM

## 2020-08-13 DIAGNOSIS — Z94.1 HEART REPLACED BY TRANSPLANT (H): ICD-10-CM

## 2020-08-13 DIAGNOSIS — Z13.220 LIPID SCREENING: ICD-10-CM

## 2020-08-13 LAB
ALBUMIN SERPL-MCNC: 2.6 G/DL (ref 3.4–5)
ALP SERPL-CCNC: 91 U/L (ref 40–150)
ALT SERPL W P-5'-P-CCNC: 27 U/L (ref 0–70)
ANION GAP SERPL CALCULATED.3IONS-SCNC: 7 MMOL/L (ref 3–14)
AST SERPL W P-5'-P-CCNC: 16 U/L (ref 0–45)
BILIRUB SERPL-MCNC: 0.6 MG/DL (ref 0.2–1.3)
BUN SERPL-MCNC: 60 MG/DL (ref 7–30)
CALCIUM SERPL-MCNC: 8.3 MG/DL (ref 8.5–10.1)
CHLORIDE SERPL-SCNC: 101 MMOL/L (ref 94–109)
CO2 SERPL-SCNC: 26 MMOL/L (ref 20–32)
CREAT SERPL-MCNC: 1.75 MG/DL (ref 0.66–1.25)
GFR SERPL CREATININE-BSD FRML MDRD: 39 ML/MIN/{1.73_M2}
GLUCOSE SERPL-MCNC: 71 MG/DL (ref 70–99)
POTASSIUM SERPL-SCNC: 4.8 MMOL/L (ref 3.4–5.3)
PROT SERPL-MCNC: 5.5 G/DL (ref 6.8–8.8)
SODIUM SERPL-SCNC: 134 MMOL/L (ref 133–144)
TACROLIMUS BLD-MCNC: 6.1 UG/L (ref 5–15)
TME LAST DOSE: NORMAL H

## 2020-08-13 PROCEDURE — 93797 PHYS/QHP OP CAR RHAB WO ECG: CPT | Mod: 59

## 2020-08-13 PROCEDURE — 40000575 ZZH STATISTIC OP CARDIAC VISIT #2

## 2020-08-13 PROCEDURE — 93798 PHYS/QHP OP CAR RHAB W/ECG: CPT

## 2020-08-13 PROCEDURE — T1013 SIGN LANG/ORAL INTERPRETER: HCPCS | Mod: U3

## 2020-08-13 PROCEDURE — 80053 COMPREHEN METABOLIC PANEL: CPT | Performed by: INTERNAL MEDICINE

## 2020-08-13 PROCEDURE — 80197 ASSAY OF TACROLIMUS: CPT | Performed by: INTERNAL MEDICINE

## 2020-08-13 PROCEDURE — 40000116 ZZH STATISTIC OP CR VISIT

## 2020-08-13 RX ORDER — TACROLIMUS 1 MG/1
CAPSULE ORAL
Qty: 240 CAPSULE | Refills: 3 | Status: SHIPPED | OUTPATIENT
Start: 2020-08-13 | End: 2020-08-18

## 2020-08-13 RX ORDER — TACROLIMUS 0.5 MG/1
CAPSULE ORAL
Qty: 90 CAPSULE | Refills: 1 | Status: ON HOLD | OUTPATIENT
Start: 2020-08-13 | End: 2020-08-28

## 2020-08-13 RX ORDER — LIDOCAINE 40 MG/G
CREAM TOPICAL
Status: CANCELLED | OUTPATIENT
Start: 2020-08-13

## 2020-08-13 NOTE — TELEPHONE ENCOUNTER
Called patient to complete Travel Screen for Cardiac Cath Lab appointment on 8/17 and inform patient of updated Visitor Policy. Pt in another appointment right now and unable to talk.    Per heart transplant criteria, Covid test not done.

## 2020-08-13 NOTE — TELEPHONE ENCOUNTER
Pt's Tacrolimus level 6.1.  Goal 10-12.  Pt to increase dose to 5mg BID.  Pt called with instructions using .  Recheck next week.  Pt and wife state understanding instructions and plan of care.

## 2020-08-14 ENCOUNTER — APPOINTMENT (OUTPATIENT)
Dept: INTERPRETER SERVICES | Facility: CLINIC | Age: 65
End: 2020-08-14
Payer: COMMERCIAL

## 2020-08-14 ENCOUNTER — TELEPHONE (OUTPATIENT)
Dept: TRANSPLANT | Facility: CLINIC | Age: 65
End: 2020-08-14

## 2020-08-14 NOTE — TELEPHONE ENCOUNTER
Pt called using .  Pt called and reminded not to take his Arixtra injection on Monday morning before his heart cath.  Pt also reminded of need for 12 drug trough needed for Tacrolimus.  Pt states understanding instructions and plan of care.

## 2020-08-17 ENCOUNTER — HOSPITAL ENCOUNTER (OUTPATIENT)
Dept: GENERAL RADIOLOGY | Facility: CLINIC | Age: 65
End: 2020-08-17
Attending: INTERNAL MEDICINE
Payer: COMMERCIAL

## 2020-08-17 ENCOUNTER — HOSPITAL ENCOUNTER (OUTPATIENT)
Facility: CLINIC | Age: 65
Discharge: HOME | End: 2020-08-17
Attending: INTERNAL MEDICINE | Admitting: INTERNAL MEDICINE
Payer: COMMERCIAL

## 2020-08-17 ENCOUNTER — HOSPITAL ENCOUNTER (OUTPATIENT)
Dept: CARDIOLOGY | Facility: CLINIC | Age: 65
End: 2020-08-17
Attending: INTERNAL MEDICINE
Payer: COMMERCIAL

## 2020-08-17 ENCOUNTER — APPOINTMENT (OUTPATIENT)
Dept: LAB | Facility: CLINIC | Age: 65
End: 2020-08-17
Attending: INTERNAL MEDICINE
Payer: COMMERCIAL

## 2020-08-17 ENCOUNTER — RESULTS ONLY (OUTPATIENT)
Dept: OTHER | Facility: CLINIC | Age: 65
End: 2020-08-17

## 2020-08-17 ENCOUNTER — APPOINTMENT (OUTPATIENT)
Dept: MEDSURG UNIT | Facility: CLINIC | Age: 65
End: 2020-08-17
Attending: INTERNAL MEDICINE
Payer: COMMERCIAL

## 2020-08-17 VITALS
SYSTOLIC BLOOD PRESSURE: 141 MMHG | RESPIRATION RATE: 18 BRPM | HEART RATE: 96 BPM | HEIGHT: 65 IN | OXYGEN SATURATION: 100 % | TEMPERATURE: 98 F | DIASTOLIC BLOOD PRESSURE: 68 MMHG | BODY MASS INDEX: 29.32 KG/M2 | WEIGHT: 176 LBS

## 2020-08-17 DIAGNOSIS — Z94.1 HEART REPLACED BY TRANSPLANT (H): ICD-10-CM

## 2020-08-17 DIAGNOSIS — Z13.220 LIPID SCREENING: ICD-10-CM

## 2020-08-17 DIAGNOSIS — Z94.1 HEART TRANSPLANT, ORTHOTOPIC, STATUS (H): ICD-10-CM

## 2020-08-17 LAB
ALBUMIN SERPL-MCNC: 2.7 G/DL (ref 3.4–5)
ALP SERPL-CCNC: 141 U/L (ref 40–150)
ALT SERPL W P-5'-P-CCNC: 29 U/L (ref 0–70)
ANION GAP SERPL CALCULATED.3IONS-SCNC: 6 MMOL/L (ref 3–14)
AST SERPL W P-5'-P-CCNC: 13 U/L (ref 0–45)
BILIRUB SERPL-MCNC: 0.5 MG/DL (ref 0.2–1.3)
BUN SERPL-MCNC: 67 MG/DL (ref 7–30)
CALCIUM SERPL-MCNC: 8.3 MG/DL (ref 8.5–10.1)
CHLORIDE SERPL-SCNC: 102 MMOL/L (ref 94–109)
CHOLEST SERPL-MCNC: 118 MG/DL
CK SERPL-CCNC: 76 U/L (ref 30–300)
CO2 SERPL-SCNC: 26 MMOL/L (ref 20–32)
CREAT SERPL-MCNC: 1.67 MG/DL (ref 0.66–1.25)
ERYTHROCYTE [DISTWIDTH] IN BLOOD BY AUTOMATED COUNT: 21.1 % (ref 10–15)
GFR SERPL CREATININE-BSD FRML MDRD: 42 ML/MIN/{1.73_M2}
GLUCOSE BLDC GLUCOMTR-MCNC: 251 MG/DL (ref 70–99)
GLUCOSE SERPL-MCNC: 391 MG/DL (ref 70–99)
HCT VFR BLD AUTO: 22.6 % (ref 40–53)
HDLC SERPL-MCNC: 75 MG/DL
HGB BLD-MCNC: 6.9 G/DL (ref 13.3–17.7)
HGB BLD-MCNC: 7.2 G/DL (ref 13.3–17.7)
INR PPP: 1.16 (ref 0.86–1.14)
LDLC SERPL CALC-MCNC: 26 MG/DL
MAGNESIUM SERPL-MCNC: 1.9 MG/DL (ref 1.6–2.3)
MCH RBC QN AUTO: 30.4 PG (ref 26.5–33)
MCHC RBC AUTO-ENTMCNC: 30.5 G/DL (ref 31.5–36.5)
MCV RBC AUTO: 100 FL (ref 78–100)
NONHDLC SERPL-MCNC: 43 MG/DL
PHOSPHATE SERPL-MCNC: 3.5 MG/DL (ref 2.5–4.5)
PLATELET # BLD AUTO: 205 10E9/L (ref 150–450)
POTASSIUM SERPL-SCNC: 4.8 MMOL/L (ref 3.4–5.3)
PROT SERPL-MCNC: 5.6 G/DL (ref 6.8–8.8)
RBC # BLD AUTO: 2.27 10E12/L (ref 4.4–5.9)
SODIUM SERPL-SCNC: 133 MMOL/L (ref 133–144)
TACROLIMUS BLD-MCNC: 5.5 UG/L (ref 5–15)
TME LAST DOSE: NORMAL H
TRIGL SERPL-MCNC: 82 MG/DL
WBC # BLD AUTO: 3.7 10E9/L (ref 4–11)

## 2020-08-17 PROCEDURE — 85610 PROTHROMBIN TIME: CPT | Performed by: INTERNAL MEDICINE

## 2020-08-17 PROCEDURE — 80197 ASSAY OF TACROLIMUS: CPT | Performed by: INTERNAL MEDICINE

## 2020-08-17 PROCEDURE — 88346 IMFLUOR 1ST 1ANTB STAIN PX: CPT | Performed by: INTERNAL MEDICINE

## 2020-08-17 PROCEDURE — 27210794 ZZH OR GENERAL SUPPLY STERILE: Performed by: INTERNAL MEDICINE

## 2020-08-17 PROCEDURE — 85018 HEMOGLOBIN: CPT | Performed by: PHYSICIAN ASSISTANT

## 2020-08-17 PROCEDURE — 82962 GLUCOSE BLOOD TEST: CPT

## 2020-08-17 PROCEDURE — 88342 IMHCHEM/IMCYTCHM 1ST ANTB: CPT | Performed by: INTERNAL MEDICINE

## 2020-08-17 PROCEDURE — 40000166 ZZH STATISTIC PP CARE STAGE 1

## 2020-08-17 PROCEDURE — 99207 ZZC NO BILLABLE SERVICE THIS VISIT: CPT | Performed by: NURSE PRACTITIONER

## 2020-08-17 PROCEDURE — 93306 TTE W/DOPPLER COMPLETE: CPT

## 2020-08-17 PROCEDURE — 80053 COMPREHEN METABOLIC PANEL: CPT | Performed by: INTERNAL MEDICINE

## 2020-08-17 PROCEDURE — 86832 HLA CLASS I HIGH DEFIN QUAL: CPT | Performed by: INTERNAL MEDICINE

## 2020-08-17 PROCEDURE — 25000125 ZZHC RX 250: Performed by: INTERNAL MEDICINE

## 2020-08-17 PROCEDURE — 84100 ASSAY OF PHOSPHORUS: CPT | Performed by: INTERNAL MEDICINE

## 2020-08-17 PROCEDURE — 86833 HLA CLASS II HIGH DEFIN QUAL: CPT | Performed by: INTERNAL MEDICINE

## 2020-08-17 PROCEDURE — 80061 LIPID PANEL: CPT | Performed by: INTERNAL MEDICINE

## 2020-08-17 PROCEDURE — 36415 COLL VENOUS BLD VENIPUNCTURE: CPT | Performed by: PHYSICIAN ASSISTANT

## 2020-08-17 PROCEDURE — 71046 X-RAY EXAM CHEST 2 VIEWS: CPT

## 2020-08-17 PROCEDURE — C1894 INTRO/SHEATH, NON-LASER: HCPCS | Performed by: INTERNAL MEDICINE

## 2020-08-17 PROCEDURE — 93505 ENDOMYOCARDIAL BIOPSY: CPT | Performed by: INTERNAL MEDICINE

## 2020-08-17 PROCEDURE — 82550 ASSAY OF CK (CPK): CPT | Performed by: INTERNAL MEDICINE

## 2020-08-17 PROCEDURE — 88350 IMFLUOR EA ADDL 1ANTB STN PX: CPT | Performed by: INTERNAL MEDICINE

## 2020-08-17 PROCEDURE — 83735 ASSAY OF MAGNESIUM: CPT | Performed by: INTERNAL MEDICINE

## 2020-08-17 PROCEDURE — 36415 COLL VENOUS BLD VENIPUNCTURE: CPT | Performed by: INTERNAL MEDICINE

## 2020-08-17 PROCEDURE — 88307 TISSUE EXAM BY PATHOLOGIST: CPT | Performed by: INTERNAL MEDICINE

## 2020-08-17 PROCEDURE — 85027 COMPLETE CBC AUTOMATED: CPT | Performed by: INTERNAL MEDICINE

## 2020-08-17 PROCEDURE — 93505 ENDOMYOCARDIAL BIOPSY: CPT | Mod: 26 | Performed by: INTERNAL MEDICINE

## 2020-08-17 PROCEDURE — 93306 TTE W/DOPPLER COMPLETE: CPT | Mod: 26 | Performed by: STUDENT IN AN ORGANIZED HEALTH CARE EDUCATION/TRAINING PROGRAM

## 2020-08-17 PROCEDURE — 87799 DETECT AGENT NOS DNA QUANT: CPT | Performed by: INTERNAL MEDICINE

## 2020-08-17 RX ORDER — LIDOCAINE 40 MG/G
CREAM TOPICAL
Status: COMPLETED | OUTPATIENT
Start: 2020-08-17 | End: 2020-08-17

## 2020-08-17 RX ADMIN — LIDOCAINE: 40 CREAM TOPICAL at 11:26

## 2020-08-17 ASSESSMENT — MIFFLIN-ST. JEOR: SCORE: 1505.21

## 2020-08-17 NOTE — IP AVS SNAPSHOT
MRN:[id number]                      After Visit Summary   8/17/2020    Lucian Henderson .    MRN: [id number]           Visit Information        Department      8/17/2020  7:55 AM [facility], [place],  Heart Cath Lab          Review of your medicines      UNREVIEWED medicines. Ask your doctor about these medicines       Dose / Directions   acetaminophen 325 MG tablet  Commonly known as:  TYLENOL  Used for:  Heart transplant, orthotopic, status (H)      Dose:  975 mg  Take 3 tablets (975 mg) by mouth every 8 hours as needed for mild pain  Quantity:  60 tablet  Refills:  0     calcium carbonate 600 mg-vitamin D 400 units 600-400 MG-UNIT per tablet  Commonly known as:  CALTRATE  Used for:  Heart transplant, orthotopic, status (H)      Dose:  1 tablet  Take 1 tablet by mouth 2 times daily (with meals)  Quantity:  60 tablet  Refills:  1     fondaparinux ANTICOAGULANT 7.5MG/0.6ML injection  Commonly known as:  ARIXTRA  Used for:  HIT (heparin-induced thrombocytopenia) (H)      Dose:  7.5 mg  Inject 0.6 mLs (7.5 mg) Subcutaneous every 24 hours Take on Tuesday 8/4 at 10 am, then start taking fondaparinux shot daily at 8 am after that (starting 8/5). Hold dose on morning of your right heart cath and biopsies  Quantity:  90 Syringe  Refills:  3     furosemide 20 MG tablet  Commonly known as:  LASIX  Used for:  Heart transplant, orthotopic, status (H)      Take 40 mg (2 tabs) by mouth in the on ODD days.  Take 20mg (1 tab) by mouth on EVEN days.  Quantity:  90 tablet  Refills:  3     glimepiride 2 MG tablet  Commonly known as:  AMARYL  Used for:  Heart transplant, orthotopic, status (H)      Dose:  6 mg  Take 3 tablets (6 mg) by mouth daily (with breakfast)  Quantity:  90 tablet  Refills:  1     hydrALAZINE 25 MG tablet  Commonly known as:  APRESOLINE  Used for:  Heart transplant, orthotopic, status (H)      Dose:  75 mg  Take 3 tablets (75 mg) by mouth every 8 hours  Quantity:  270 tablet  Refills:  1      insulin aspart 100 UNIT/ML pen  Commonly known as:  NovoLOG PEN  Used for:  Heart transplant, orthotopic, status (H)      Dose:  2-16 Units  Inject 2-16 Units Subcutaneous 3 times daily (with meals) Correction Scale - custom DOSING     Do Not give Correction Insulin if Pre-Meal BG less than 140     -169 give 2 units.  -199 give 4 units.  -229 give 6 units.  -259 give 8 units.  -289 give 10 units.  -319 give 12 units.  -349 give 14 units.  BG >/= 350 give 16 units.        To be given with prandial insulin, and based on pre-meal blood glucose.   Notify provider if glucose greater than or equal 350 mg/dL after administration. If given at mealtime, administer within 30 minutes of start of meal  Quantity:  9 mL  Refills:  1     insulin isophane human 100 UNIT/ML injection  Commonly known as:  HumuLIN N PEN  Used for:  Heart transplant, orthotopic, status (H)      Dose:  35 Units  Inject 35 Units Subcutaneous every morning  Quantity:  9 mL  Refills:  1     methocarbamol 500 MG tablet  Commonly known as:  ROBAXIN  Used for:  Heart transplant, orthotopic, status (H)      Dose:  500 mg  1 tablet (500 mg) by Oral or Feeding Tube route 4 times daily as needed for muscle spasms  Quantity:  30 tablet  Refills:  1     mycophenolate 250 MG capsule  Commonly known as:  GENERIC EQUIVALENT  Used for:  Heart transplant, orthotopic, status (H)      Dose:  1,500 mg  Take 6 capsules (1,500 mg) by mouth 2 times daily  Quantity:  360 capsule  Refills:  1     nystatin 150675 UNIT/ML suspension  Commonly known as:  MYCOSTATIN  Used for:  Heart transplant, orthotopic, status (H)      Dose:  1,000,000 Units  Swish and swallow 10 mLs (1,000,000 Units) in mouth 4 times daily  Quantity:  800 mL  Refills:  1     pantoprazole 40 MG EC tablet  Commonly known as:  PROTONIX  Used for:  Heart transplant, orthotopic, status (H)      Dose:  40 mg  Take 1 tablet (40 mg) by mouth every morning (before  breakfast)  Quantity:  30 tablet  Refills:  1     polyethylene glycol 17 g packet  Commonly known as:  MIRALAX  Used for:  Heart transplant, orthotopic, status (H)      Dose:  17 g  17 g by Oral or Feeding Tube route daily  Quantity:  7 packet  Refills:  0     predniSONE 10 MG tablet  Commonly known as:  DELTASONE  Used for:  Heart transplant, orthotopic, status (H)      Take 1.5 tablets (15 mg) by mouth every morning AND 1.5 tablets (15 mg) every evening.  Quantity:  180 tablet  Refills:  1     rosuvastatin 10 MG tablet  Commonly known as:  CRESTOR  Used for:  Heart transplant, orthotopic, status (H)      Dose:  10 mg  Take 1 tablet (10 mg) by mouth daily  Quantity:  30 tablet  Refills:  0     senna-docusate 8.6-50 MG tablet  Commonly known as:  SENOKOT-S/PERICOLACE  Used for:  Heart transplant, orthotopic, status (H)      Dose:  1 tablet  Take 1 tablet by mouth 2 times daily as needed for constipation  Quantity:  50 tablet  Refills:  0     sitagliptin 100 MG tablet  Commonly known as:  JANUVIA  Used for:  Heart transplant, orthotopic, status (H)      Dose:  100 mg  Take 1 tablet (100 mg) by mouth daily  Quantity:  30 tablet  Refills:  1     sulfamethoxazole-trimethoprim 400-80 MG tablet  Commonly known as:  BACTRIM  Indication:  Preventative Medication Therapy Used Around Surgery  Used for:  Heart transplant, orthotopic, status (H)      Dose:  1 tablet  Take 1 tablet by mouth daily  Quantity:  30 tablet  Refills:  1     * tacrolimus 1 MG capsule  Commonly known as:  GENERIC EQUIVALENT  Used for:  Heart transplant, orthotopic, status (H)      Take 5 capsules (5mg) two times a day.  Quantity:  240 capsule  Refills:  3     * tacrolimus 0.5 MG capsule  Commonly known as:  GENERIC EQUIVALENT  Used for:  Heart transplant, orthotopic, status (H)      ON HOLD- for medication dose changes.  Quantity:  90 capsule  Refills:  1     tamsulosin 0.4 MG capsule  Commonly known as:  FLOMAX  Used for:  Heart transplant, orthotopic,  status (H)      Dose:  0.4 mg  Take 1 capsule (0.4 mg) by mouth daily  Quantity:  30 capsule  Refills:  1     valGANciclovir 450 MG tablet  Commonly known as:  VALCYTE  Indication:  Medication Treatment to Prevent Cytomegalovirus Disease  Used for:  Heart transplant, orthotopic, status (H)      Dose:  450 mg  Take 1 tablet (450 mg) by mouth daily  Quantity:  30 tablet  Refills:  1         * This list has 2 medication(s) that are the same as other medications prescribed for you. Read the directions carefully, and ask your doctor or other care provider to review them with you.            CONTINUE these medicines which have NOT CHANGED       Dose / Directions   Alcohol Swabs Pads  Used for:  Heart transplant, orthotopic, status (H)      Use to swab the area of the injection or sergio as directed   Per insurance coverage  Quantity:  100 each  Refills:  0     blood glucose monitoring lancets  Used for:  Type 2 diabetes mellitus with diabetic nephropathy, with long-term current use of insulin (H)      Use to test blood sugars 2 times daily.  Quantity:  200 each  Refills:  3     blood glucose monitoring meter device kit  Used for:  Type 2 diabetes mellitus with diabetic nephropathy, with long-term current use of insulin (H)      Use to test blood sugar 3-4 times daily, as directed.  Quantity:  1 kit  Refills:  0     * blood glucose test strip  Commonly known as:  Accu-Chek SmartView  Used for:  Type 2 diabetes mellitus with diabetic nephropathy, with long-term current use of insulin (H)      USE TO TEST THREE TIMES DAILY AS DIRECTED  Quantity:  100 strip  Refills:  0     * blood glucose test strip  Commonly known as:  NO BRAND SPECIFIED  Used for:  Type 2 diabetes mellitus with diabetic nephropathy, with long-term current use of insulin (H)      Use to test blood sugar 2 times daily or as directed.  Quantity:  200 strip  Refills:  3     * insulin pen needle 31G X 5 MM miscellaneous  Commonly known as:  B-D U/F  Used for:   Type 2 diabetes mellitus with diabetic nephropathy, with long-term current use of insulin (H)      Dose:  1 Units  1 Units by Device route 2 times daily Use 2 pen needles daily or as directed.  Quantity:  100 each  Refills:  1     * insulin pen needle 32G X 4 MM miscellaneous  Commonly known as:  32G X 4 MM  Used for:  Heart transplant, orthotopic, status (H)      Use as directed by provider  Per insurance coverage  Quantity:  100 each  Refills:  0     order for DME      Auto CPAP 8-15 cmH20  Quantity:  1 Units  Refills:  0         * This list has 4 medication(s) that are the same as other medications prescribed for you. Read the directions carefully, and ask your doctor or other care provider to review them with you.                  Protect others around you: Learn how to safely use, store and throw away your medicines at www.disposemymeds.org.       Follow-ups after your visit       Your next 10 appointments already scheduled    Aug 18, 2020 10:30 AM CDT  (Arrive by 10:15 AM)  Video Visit with Marisabel Prieto PA-C  [facility] ([facility]) [address] [place] [address]  3rd [facility] [address]  [phone]   [facility]  Note: this is not an onsite visit; there is no need to come to the facility.  Please have a list of all current medications available for appointment.      Aug 19, 2020 10:00 AM CDT  Cardiac Treatment with Ur Cardiac Rehab 1  [facility], Cardiac Rehabilitation ([facility]) [address] [place] [address]  [phone]      Aug 21, 2020 10:00 AM CDT  Cardiac Treatment with Ur Cardiac Rehab 1  [facility], Cardiac Rehabilitation ([facility]) [address] [place] [address]  [phone]      Aug 24, 2020 10:00 AM CDT  Cardiac Treatment  with Ur Cardiac Rehab 1  [facility], Cardiac Rehabilitation ([facility]) [address]  [facility] [address]  [phone]      Aug 26, 2020 10:00 AM CDT  Cardiac Treatment with Ur Cardiac Rehab 1  [facility], Cardiac Rehabilitation ([facility]) [address]  [facility] [address]  [phone]      Aug 28, 2020 10:00 AM CDT  Cardiac Treatment with Ur Cardiac Rehab 1  [facility], Cardiac Rehabilitation ([facility]) [address]  [facility] [address]  [phone]      Aug 31, 2020  8:30 AM CDT  LAB with UU LAB GOLD WAITING  [facility], Lab ([facility]) [address] [facility] [address]   Please do not eat 10-12 hours before your appointment if you are coming in fasting for labs on lipids, cholesterol, or glucose (sugar). Does not apply to pregnant women. Water, tea and black coffee (with nothing added) is okay. Do not drink other fluids, diet soda or gum. If you have concerns about taking your medications, please send a message by clicking on Secure Messaging, Message Your Care Team.     Aug 31, 2020  9:00 AM CDT  Procedure - 2.5 hour with U2A ROOM 2  Unit 2A [facility] ([facility]) [address] [facility] [address]      Aug 31, 2020  Procedure with Sonny Saleh MD  [facility]  Heart Cath Lab ([facility]) [address] [facility] [address]  [phone]   The [facility] is located on the corner of [facility] and [facility] on the [facility]. It is  easily accessible from virtually any point in the [facility] area, via [address].   Sep 02, 2020 10:00 AM CDT  Cardiac Treatment with Ur Cardiac Rehab 1  [facility] [place], Cardiac Rehabilitation ([facility]) [address]  [facility] [address] [phone]         Care Instructions       Further instructions from your care team       [facility]                        Interventional Cardiology  Discharge Instructions   Post Right Heart Cath      AFTER YOU GO HOME:    DO drink plenty of fluids    DO resume your regular diet and medications unless otherwise instructed by your Primary Physician    Do Not scrub the procedure site vigorously    No lotion or powder to the puncture site for 3 days    CALL YOUR PRIMARY PHYSICIAN IF: You may resume all normal activity.  Monitor neck site for bleeding, swelling, or voice changes. If you notice bleeding or swelling immediately apply pressure to the site and call number below to speak with Cardiology Fellow.  If you experience any changes in your breathing you should call your doctor immediately or come to the closest Emergency Department.  Do not drive yourself.    ADDITIONAL INSTRUCTIONS: Medications: You are to resume all home medications including anticoagulation therapy unless otherwise advised by your primary cardiologist or nurse coordinator.    Follow Up: Per your primary cardiology team    If you have any questions or concerns regarding your procedure site please call [phone] at anytime and ask for Cardiology Fellow on call.  They are available 24 hours a day.  You may also contact the Cardiology Clinic after hours number at [phone].                                                       Telephone Numbers [phone] Monday-Friday 8:00 am to 4:30 pm    [phone] After 4:30 pm Monday-Friday, Weekends & Holidays  Ask for  "Interventional Cardiologist on call. Someone is on call 24 hours/day   [facility] toll free number [phone] Monday-Friday 8:00 am to 4:30 pm   [facility] Emergency Dept [phone]                   Additional Information About Your Visit       MyChart Information    [employer] gives you secure access to your electronic health record. If you see a primary care provider, you can also send messages to your care team and make appointments. If you have questions, please call your primary care clinic.  If you do not have a primary care provider, please call [phone] and they will assist you.       Care EveryWhere ID    This is your Care EveryWhere ID. This could be used by other organizations to access your [facility] medical records  [id number]       Your Vitals Were  Most recent update: 8/17/2020  3:38 PM    Blood Pressure   105/51 (BP Location: Left arm)          Temperature   98  F (36.7  C) (Oral)          Respirations   18          Height   1.651 m (5' 5\")          Weight   79.8 kg (176 lb)             BMI (Body Mass Index)   29.29 kg/m           Primary Care Provider Office Phone # Fax #    Suyapa Hatfield [phone]      Equal Access to Services    MARIBETH RUSH AH: Hadii aad ku hadasho Soomaali, waaxda luqadaha, qaybta kaalmada adeegyada, joana prestonn arpita pulido. So [facility] [phone].    ATENCIÓN: Si habla español, tiene a lainez disposición servicios gratuitos de asistencia lingüística. Llame al [phone].    We comply with applicable federal and state civil rights laws, including the [place] Human Rights Act. We do not discriminate on the basis of race, color, creed, Roman Catholic, national origin, marital status, age, disability, sex, sexual orientation, or gender identity.       Thank you!    Thank you for choosing [facility] for your care. Our goal is always to provide you with excellent care. Hearing back from our patients is one way we can continue to improve our services. Please " take a few minutes to complete the written survey that you may receive in the mail after you visit with us. Thank you!            Medication List      Medications          Morning Afternoon Evening Bedtime As Needed    Alcohol Swabs Pads  INSTRUCTIONS:  Use to swab the area of the injection or sergio as directed   Per insurance coverage                     blood glucose monitoring lancets  INSTRUCTIONS:  Use to test blood sugars 2 times daily.                     blood glucose monitoring meter device kit  INSTRUCTIONS:  Use to test blood sugar 3-4 times daily, as directed.                     * blood glucose test strip  Also known as:  Accu-Chek SmartView  INSTRUCTIONS:  USE TO TEST THREE TIMES DAILY AS DIRECTED  Doctor's comments:  Due for appointment for further refills, please give reminder.                     * blood glucose test strip  Also known as:  NO BRAND SPECIFIED  INSTRUCTIONS:  Use to test blood sugar 2 times daily or as directed.                     * insulin pen needle 31G X 5 MM miscellaneous  Also known as:  B-D U/F  INSTRUCTIONS:  1 Units by Device route 2 times daily Use 2 pen needles daily or as directed.                     * insulin pen needle 32G X 4 MM miscellaneous  Also known as:  32G X 4 MM  INSTRUCTIONS:  Use as directed by provider  Per insurance coverage                     order for DME  INSTRUCTIONS:  Auto CPAP 8-15 cmH20                        * This list has 4 medication(s) that are the same as other medications prescribed for you. Read the directions carefully, and ask your doctor or other care provider to review them with you.            ASK your doctor about these medications          Morning Afternoon Evening Bedtime As Needed    acetaminophen 325 MG tablet  Also known as:  TYLENOL  INSTRUCTIONS:  Take 3 tablets (975 mg) by mouth every 8 hours as needed for mild pain                     calcium carbonate 600 mg-vitamin D 400 units 600-400 MG-UNIT per tablet  Also known as:   CALTRATE  INSTRUCTIONS:  Take 1 tablet by mouth 2 times daily (with meals)                     fondaparinux ANTICOAGULANT 7.5MG/0.6ML injection  Also known as:  ARIXTRA  INSTRUCTIONS:  Inject 0.6 mLs (7.5 mg) Subcutaneous every 24 hours Take on Tuesday 8/4 at 10 am, then start taking fondaparinux shot daily at 8 am after that (starting 8/5). Hold dose on morning of your right heart cath and biopsies                     furosemide 20 MG tablet  Also known as:  LASIX  INSTRUCTIONS:  Take 40 mg (2 tabs) by mouth in the on ODD days.  Take 20mg (1 tab) by mouth on EVEN days.                     glimepiride 2 MG tablet  Also known as:  AMARYL  INSTRUCTIONS:  Take 3 tablets (6 mg) by mouth daily (with breakfast)                     hydrALAZINE 25 MG tablet  Also known as:  APRESOLINE  INSTRUCTIONS:  Take 3 tablets (75 mg) by mouth every 8 hours                     insulin aspart 100 UNIT/ML pen  Also known as:  NovoLOG PEN  INSTRUCTIONS:  Inject 2-16 Units Subcutaneous 3 times daily (with meals) Correction Scale - custom DOSING     Do Not give Correction Insulin if Pre-Meal BG less than 140     -169 give 2 units.  -199 give 4 units.  -229 give 6 units.  -259 give 8 units.  -289 give 10 units.  -319 give 12 units.  -349 give 14 units.  BG >/= 350 give 16 units.        To be given with prandial insulin, and based on pre-meal blood glucose.   Notify provider if glucose greater than or equal 350 mg/dL after administration. If given at mealtime, administer within 30 minutes of start of meal                     insulin isophane human 100 UNIT/ML injection  Also known as:  HumuLIN N PEN  INSTRUCTIONS:  Inject 35 Units Subcutaneous every morning                     methocarbamol 500 MG tablet  Also known as:  ROBAXIN  INSTRUCTIONS:  1 tablet (500 mg) by Oral or Feeding Tube route 4 times daily as needed for muscle spasms                     mycophenolate 250 MG capsule  Also known as:   GENERIC EQUIVALENT  INSTRUCTIONS:  Take 6 capsules (1,500 mg) by mouth 2 times daily                     nystatin 404630 UNIT/ML suspension  Also known as:  MYCOSTATIN  INSTRUCTIONS:  Swish and swallow 10 mLs (1,000,000 Units) in mouth 4 times daily                     pantoprazole 40 MG EC tablet  Also known as:  PROTONIX  INSTRUCTIONS:  Take 1 tablet (40 mg) by mouth every morning (before breakfast)                     polyethylene glycol 17 g packet  Also known as:  MIRALAX  INSTRUCTIONS:  17 g by Oral or Feeding Tube route daily                     predniSONE 10 MG tablet  Also known as:  DELTASONE  INSTRUCTIONS:  Take 1.5 tablets (15 mg) by mouth every morning AND 1.5 tablets (15 mg) every evening.                     rosuvastatin 10 MG tablet  Also known as:  CRESTOR  INSTRUCTIONS:  Take 1 tablet (10 mg) by mouth daily                     senna-docusate 8.6-50 MG tablet  Also known as:  SENOKOT-S/PERICOLACE  INSTRUCTIONS:  Take 1 tablet by mouth 2 times daily as needed for constipation                     sitagliptin 100 MG tablet  Also known as:  JANUVIA  INSTRUCTIONS:  Take 1 tablet (100 mg) by mouth daily                     sulfamethoxazole-trimethoprim 400-80 MG tablet  Also known as:  BACTRIM  INSTRUCTIONS:  Take 1 tablet by mouth daily  Reason for med:  Preventative Medication Therapy Used Around Surgery                     * tacrolimus 1 MG capsule  Also known as:  GENERIC EQUIVALENT  INSTRUCTIONS:  Take 5 capsules (5mg) two times a day.  Doctor's comments:  TXP DT 7/19/2020 (Heart) TXP Dischg DT  DX Heart replaced by transplant Z94.1 TX [facility] ([place], [place])                     * tacrolimus 0.5 MG capsule  Also known as:  GENERIC EQUIVALENT  INSTRUCTIONS:  ON HOLD- for medication dose changes.  Doctor's comments:  TXP DT 7/19/2020 (Heart) TXP Dischg DT  DX Heart replaced by transplant Z94.1 [facility]  ([place], [place])                     tamsulosin 0.4 MG capsule  Also known as:  FLOMAX  INSTRUCTIONS:  Take 1 capsule (0.4 mg) by mouth daily                     valGANciclovir 450 MG tablet  Also known as:  VALCYTE  INSTRUCTIONS:  Take 1 tablet (450 mg) by mouth daily  Reason for med:  Medication Treatment to Prevent Cytomegalovirus Disease                        * This list has 2 medication(s) that are the same as other medications prescribed for you. Read the directions carefully, and ask your doctor or other care provider to review them with you.

## 2020-08-17 NOTE — PRE-PROCEDURE
GENERAL PRE-PROCEDURE:   Procedure:  Right heart cath and biopsy  Date/Time:  8/17/2020 12:35 PM    Written consent obtained?: Yes    Risks and benefits: Risks, benefits and alternatives were discussed    Consent given by:  Patient  Patient states understanding of procedure being performed: Yes    Patient's understanding of procedure matches consent: Yes    Procedure consent matches procedure scheduled: Yes    Expected level of sedation:  Moderate  Appropriately NPO:  Yes  ASA Class:  Class 3- Severe systemic disease, definite functional limitations  Mallampati  :  Grade 2- soft palate, base of uvula, tonsillar pillars, and portion of posterior pharyngeal wall visible  Lungs:  Lungs clear with good breath sounds bilaterally  Heart:  Normal heart sounds and rate  History & Physical reviewed:  History and physical reviewed and no updates needed  Statement of review:  I have reviewed the lab findings, diagnostic data, medications, and the plan for sedation    Aleta Hernandez MD  Cardiology Fellow

## 2020-08-17 NOTE — IP AVS SNAPSHOT
[facility], [facility],  Heart Cath Lab  [address] [address]  Phone:  [phone]                                    After Visit Summary   8/17/2020    Lucian Henderson     MRN: [id number]           After Visit Summary Signature Page    I have received my discharge instructions, and my questions have been answered. I have discussed any challenges I see with this plan with the nurse or doctor.    ..........................................................................................................................................  Patient/Patient Representative Signature      ..........................................................................................................................................  Patient Representative Print Name and Relationship to Patient    ..................................................               ................................................  Date                                   Time    ..........................................................................................................................................  Reviewed by Signature/Title    ...................................................              ..............................................  Date                                               Time          22EPIC Rev 08/18

## 2020-08-17 NOTE — PROGRESS NOTES
ADULT HEART TRANSPLANT CLINIC    HPI:   Mr. Tee Henderson is a 64 year old male w/ICM now s/p OHT 7/20/2020 c/b donor strep salvarius bacteremia, CAD s/p CABG in 2008, DM Type II, HIT, RUE DVT, urinary retention who presents to clinic today for routine heart transplant follow-up.    His postoperative course was complicated by primary graft dysfunction on POD1 with Echo consistent with EF 20-25% and severe RV dysfunction secondary to prolonged ischemic time. Graft function returned to normal on POD 6. Incidental pericardial effusion noted on Echo 7/27, which noted improved 7/29. He has a history of RIJ clot and remains on fondaparinux due to HIT history. He completed a 7 day course of antibiotics due to donor bacteremia.     He complains of lower extremity edema, but notes stable weight at 173 lbs. He denies fever, chills, oral lesions, lightheadedness, dizziness, chest pain, palpitations, SOB, ALBRIGHT, PND, orthopnea, nausea, vomiting, and diarrhea. His home /[address].     TRANSPLANT MEDICATIONS:  Immunosuppression: Tac 5 mg po BID level pending (goal 10-12). Mycophenolate 1500 mg po BID. Prednisone 15 mg po BID.   Prophylaxis: Valcyte, Bactrim, and Nystatin.     LAST BIOPSY: pending today   LAST ANGIOGRAM: Delayed in setting of poor renal function   Serostatus: CMV: D+/R+, EBV: D+/R+, Toxo: D+/R-  Intolerance to medications: None  Rejection history: None     PAST MEDICAL HISTORY:  Past Medical History:   Diagnosis Date     CAD (coronary artery disease)      CHF (congestive heart failure) (H)      CKD (chronic kidney disease), stage III (H)      Cortical cataract of both eyes      Diabetes (H)      Hyperlipidemia      Hypertension      Ischemic cardiomyopathy      Obesity      CHANTEL (obstructive sleep apnea)     occas cpap     Osteoarthritis        FAMILY HISTORY:  Family History   Problem Relation Age of Onset     Diabetes Brother      Diabetes Sister      Diabetes Sister      Macular Degeneration No family hx of       Glaucoma No family hx of      Myocardial Infarction No family hx of      Kidney Disease No family hx of        SOCIAL HISTORY:  Social History     Socioeconomic History     Marital status:      Spouse name: Not on file     Number of children: 4     Years of education: Not on file     Highest education level: Not on file   Occupational History     Occupation:      Employer: [employer]     Employer: RETIRED   Social Needs     Financial resource strain: Not on file     Food insecurity     Worry: Not on file     Inability: Not on file     Transportation needs     Medical: Not on file     Non-medical: Not on file   Tobacco Use     Smoking status: Never Smoker     Smokeless tobacco: Never Used     Tobacco comment: Never smoked; non-smoking household   Substance and Sexual Activity     Alcohol use: No     Alcohol/week: 0.0 standard drinks     Drug use: No     Sexual activity: Yes     Partners: Female   Lifestyle     Physical activity     Days per week: Not on file     Minutes per session: Not on file     Stress: Not on file   Relationships     Social connections     Talks on phone: Not on file     Gets together: Not on file     Attends Voodoo service: Not on file     Active member of club or organization: Not on file     Attends meetings of clubs or organizations: Not on file     Relationship status: Not on file     Intimate partner violence     Fear of current or ex partner: Not on file     Emotionally abused: Not on file     Physically abused: Not on file     Forced sexual activity: Not on file   Other Topics Concern     Parent/sibling w/ CABG, MI or angioplasty before 65F 55M? No   Social History Narrative     Not on file       CURRENT MEDICATIONS:  Current Outpatient Medications   Medication Sig Dispense Refill     acetaminophen (TYLENOL) 325 MG tablet Take 3 tablets (975 mg) by mouth every 8 hours as needed for mild pain 60 tablet 0     fondaparinux ANTICOAGULANT (ARIXTRA) 7.5MG/0.6ML  injection Inject 0.6 mLs (7.5 mg) Subcutaneous every 24 hours Take on Tuesday 8/4 at 10 am, then start taking fondaparinux shot daily at 8 am after that (starting 8/5). Hold dose on morning of your right heart cath and biopsies 90 Syringe 3     hydrALAZINE (APRESOLINE) 25 MG tablet Take 3 tablets (75 mg) by mouth every 8 hours 270 tablet 1     methocarbamol (ROBAXIN) 500 MG tablet 1 tablet (500 mg) by Oral or Feeding Tube route 4 times daily as needed for muscle spasms 30 tablet 1     polyethylene glycol (MIRALAX) 17 g packet 17 g by Oral or Feeding Tube route daily 7 packet 0     tacrolimus (GENERIC EQUIVALENT) 0.5 MG capsule ON HOLD- for medication dose changes. 90 capsule 1     Alcohol Swabs PADS Use to swab the area of the injection or sergio as directed   Per insurance coverage 100 each 0     amLODIPine (NORVASC) 5 MG tablet Take 1 tablet (5 mg) by mouth daily 90 tablet 3     blood glucose (ACCU-CHEK SMARTVIEW) test strip USE TO TEST THREE TIMES DAILY AS DIRECTED 100 strip 0     blood glucose (NO BRAND SPECIFIED) test strip Use to test blood sugar 2 times daily or as directed. 200 strip 3     blood glucose monitoring (ACCU-CHEK FELIPE SMARTVIEW) meter device kit Use to test blood sugar 3-4 times daily, as directed. 1 kit 0     blood glucose monitoring (ONE TOUCH DELICA) lancets Use to test blood sugars 2 times daily. 200 each 3     calcium carbonate 600 mg-vitamin D 400 units (CALTRATE) 600-400 MG-UNIT per tablet Take 1 tablet by mouth 2 times daily (with meals) 180 tablet 3     Continuous Blood Gluc Sensor (FREESTYLE JEREMY 14 DAY SENSOR) MISC Change every 14 days. 6 each 4     furosemide (LASIX) 20 MG tablet Take 1 tablet (20 mg) by mouth daily 90 tablet 3     glimepiride (AMARYL) 2 MG tablet Take 3 tablets (6 mg) by mouth daily (with breakfast) 270 tablet 3     insulin aspart (NOVOLOG PEN) 100 UNIT/ML pen Inject 2-16 Units Subcutaneous 3 times daily (with meals) Correction Scale - custom DOSING     Do Not give  Correction Insulin if Pre-Meal BG less than 140     -169 give 2 units.  -199 give 4 units.  -229 give 6 units.  -259 give 8 units.  -289 give 10 units.  -319 give 12 units.  -349 give 14 units.  BG >/= 350 give 16 units.        To be given with prandial insulin, and based on pre-meal blood glucose.   Notify provider if glucose greater than or equal 350 mg/dL after administration. If given at mealtime, administer within 30 minutes of start of meal 9 mL 4     insulin isophane human (HUMULIN N PEN) 100 UNIT/ML injection Inject 50 Units Subcutaneous every morning 9 mL 11     insulin isophane human (HUMULIN N PEN) 100 UNIT/ML injection Inject 40 Units Subcutaneous every evening 30 mL 1     insulin pen needle (32G X 4 MM) 32G X 4 MM miscellaneous Use as directed by provider  Per insurance coverage 100 each 0     insulin pen needle (B-D U/F) 31G X 5 MM miscellaneous 1 Units by Device route 2 times daily Use 2 pen needles daily or as directed. 100 each 3     insulin pen needle (NOVOFINE) 30G X 8 MM miscellaneous Inject 1 Box Subcutaneous 6 times daily Use 6 pen needles daily or as directed. 200 each 3     mycophenolate (GENERIC EQUIVALENT) 250 MG capsule Take 6 capsules (1,500 mg) by mouth 2 times daily 360 capsule 3     nystatin (MYCOSTATIN) 714075 UNIT/ML suspension Swish and swallow 10 mLs (1,000,000 Units) in mouth 4 times daily 800 mL 4     order for DME Auto CPAP 8-15 cmH20 1 Units 0     pantoprazole (PROTONIX) 40 MG EC tablet Take 1 tablet (40 mg) by mouth every morning (before breakfast) 90 tablet 3     predniSONE (DELTASONE) 10 MG tablet Take 1.5 tablets (15 mg) by mouth every morning AND 1 tablet (10 mg) every evening. 180 tablet 1     rosuvastatin (CRESTOR) 10 MG tablet Take 1 tablet (10 mg) by mouth daily 90 tablet 3     senna-docusate (SENOKOT-S/PERICOLACE) 8.6-50 MG tablet Take 1 tablet by mouth 2 times daily as needed for constipation 50 tablet 0     sitagliptin  "(JANUVIA) 100 MG tablet Take 1 tablet (100 mg) by mouth daily 90 tablet 3     sulfamethoxazole-trimethoprim (BACTRIM) 400-80 MG tablet Take 1 tablet by mouth daily 90 tablet 3     tacrolimus (GENERIC EQUIVALENT) 1 MG capsule Take 6 capsules (6 mg) by mouth every morning AND 5 capsules (5 mg) every evening. 330 capsule 3     tamsulosin (FLOMAX) 0.4 MG capsule Take 1 capsule (0.4 mg) by mouth daily 30 capsule 11     valGANciclovir (VALCYTE) 450 MG tablet Take 1 tablet (450 mg) by mouth daily 30 tablet 1         ROS:   CONSTITUTIONAL: Denies fever, chills, fatigue, or weight fluctuations.   HEENT: Denies headache, vision changes, and changes in speech.   CV: Refer to HPI.   PULMONARY:Denies shortness of breath, cough, or previous TB exposure.   GI:Denies nausea, vomiting, diarrhea, and abdominal pain. Bowel movements are regular.   :Denies urinary alterations, dysuria, urinary frequency, hematuria, and abnormal drainage.   EXT: Complains of lower extremity edema.   SKIN:Denies abnormal rashes or lesions.   MUSCULOSKELETAL:Denies upper or lower extremity weakness and pain.   NEUROLOGIC:Denies lightheadedness, dizziness, seizures, or upper or lower extremity paresthesia.     EXAM:  BP (!) 141/68 (BP Location: Left arm, Cuff Size: Adult Regular)   Pulse 96   Temp 98  F (36.7  C) (Oral)   Resp 18   Ht 1.651 m (5' 5\")   Wt 79.8 kg (176 lb)   SpO2 100%   BMI 29.29 kg/m    GENERAL: Appears alert and oriented times three.   HEENT: Eye symmetrical and free of discharge bilaterally. Mucous membranes moist and without lesions.  NECK: Supple and without lymphadenopathy. JVD below clavicular line upright.   CV: RRR, S1S2 present without murmur, rub, or gallop.   RESPIRATORY: Respirations regular, even, and unlabored. Lungs CTA throughout.   GI: Soft and non distended with normoactive bowel sounds present in all quadrants. No tenderness, rebound, guarding. No organomegaly.   EXTREMITIES: +2 bilateral LE peripheral edema. 2+ " bilateral pedal pulses.   NEUROLOGIC: Alert and orientated x 3. CN II-XII grossly intact. No focal deficits.   MUSCULOSKELETAL: No joint swelling or tenderness.   SKIN: No jaundice. No rashes or lesions.     Labs:  CBC RESULTS:  Lab Results   Component Value Date    WBC 3.7 (L) 08/17/2020    RBC 2.27 (L) 08/17/2020    HGB 7.2 (L) 08/17/2020    HCT 22.6 (L) 08/17/2020     08/17/2020    MCH 30.4 08/17/2020    MCHC 30.5 (L) 08/17/2020    RDW 21.1 (H) 08/17/2020     08/17/2020       CMP RESULTS:  Lab Results   Component Value Date     08/17/2020    POTASSIUM 4.8 08/17/2020    CHLORIDE 102 08/17/2020    CO2 26 08/17/2020    ANIONGAP 6 08/17/2020     (H) 08/17/2020    BUN 67 (H) 08/17/2020    CR 1.67 (H) 08/17/2020    GFRESTIMATED 42 (L) 08/17/2020    GFRESTBLACK 48 (L) 08/17/2020    BRYANNA 8.3 (L) 08/17/2020    BILITOTAL 0.5 08/17/2020    ALBUMIN 2.7 (L) 08/17/2020    ALKPHOS 141 08/17/2020    ALT 29 08/17/2020    AST 13 08/17/2020        INR RESULTS:  Lab Results   Component Value Date    INR 1.16 (H) 08/17/2020       LIPID RESULTS:  Lab Results   Component Value Date    CHOL 118 08/17/2020    HDL 75 08/17/2020    LDL 26 08/17/2020    TRIG 82 08/17/2020    CHOLHDLRATIO 2.6 06/27/2015       IMMUNOSUPPRESSANT LEVELS  Lab Results   Component Value Date    TACROL 5.5 08/17/2020    DOSTAC Not Provided 08/17/2020       No components found for: CK  Lab Results   Component Value Date    MAG 1.9 08/17/2020     Lab Results   Component Value Date    A1C 8.2 (H) 07/03/2020     Lab Results   Component Value Date    PHOS 3.5 08/17/2020     Lab Results   Component Value Date    NTBNP 6,187 (H) 11/25/2019     Lab Results   Component Value Date    SAITESTMET SA FCS 08/17/2020    SAICELL Class I 08/17/2020    [id number] None 08/17/2020    EG7TADQZRN B:64 08/17/2020    SAIREPCOM  08/17/2020      Test performed by modified procedure. Serum heat inactivated and tested   by a modified ([place]) protocol including fetal  calf serum addition.   High-risk, mfi >3,000. Mod-risk, mfi 500-3,000.       Lab Results   Component Value Date    SAIITESTME SA FCS 08/17/2020    SAIICELL Class II 08/17/2020    AY2ATAKET None 08/17/2020    DS6LQJODUP DR:Florian 08/17/2020    SAIIREPCOM  08/17/2020      Test performed by modified procedure. Serum heat inactivated and tested   by a modified ([place]) protocol including fetal calf serum addition.   High-risk, mfi >3,000. Mod-risk, mfi 500-3,000.       Lab Results   Component Value Date    CSPEC Plasma, EDTA anticoagulant 08/17/2020       Diagnostic Studies:  Echo 8/17/20:  Interpretation Summary  s/p heart transplant. Patient in bigeminy during examination.     Global and regional left ventricular function is normal with an EF of 55-60%.  The right ventricle is normal size. Global right ventricular function is  normal.  Trace tricuspid regurgitation. The peak velocity of the tricuspid regurgitant  jet is not obtainable, but PA acceleration time is shortened.  No pericardial effusion is present.  IVC diameter <2.1 cm collapsing >50% with sniff suggests a normal RA pressure  of 3 mmHg.  This study was compared with the study from 07/29/2020. The pleural and  pericardial effusions are no longer visualized.    RHC 8/17/20:  RA 11/10/7  RV 33/7  PA 33/16/24  PCWP 15/15/13  LIO 5.6/3.0  TD 5.1/2.7  PVR 1.96 (LIO)  TD 1314 (LIO) Right sided filling pressures are mildly elevated.Left sided filling pressures are normal. Mild elevated Pulmonary Hypertension.Normal cardiac output level.Hemodynamic data has been modified in Epic per physician review.    Assessment and Plan:   Mr. Tee Henderson is a 64 year old male w/ICM now s/p OHT 7/20/2020 c/b donor strep salvarius bacteremia, CAD s/p CABG in 2008, DM Type II, HIT, RUE DVT, urinary retention who presents to clinic today for routine heart transplant follow-up. He continues to struggle with LE edema and has some marrow suppression likely in setting of  immunosuppression.     Status post Heart Transplantation on 7/20/20 due to ICM. Primary graft dysfunction, resolved.    Change in immunosuppression: yes  Reason for Change: pancytopenia.   Immunosuppression: Tac 5 mg po BID level pending (goal 10-12). Mycophenolate 1500 mg po BID. Prednisone 15 mg po BID.   Prophylaxis: Valcyte, Bactrim, and Nystatin.   - Continue ASA and Crestor.     Next Biopsy: Pending today  Next Angiogram: Deferred in setting of renal function   Next Stress Test: Per protocol   Dermatology evaluation: Annually   Opthalmology evaluation: Annually   - Discussed decreasing Cellcept with Dr. Sheridan in setting of marrow suppression, will await his recommendation given lower Tac level today. Remains on Valcyte CMV: D+/R+ and Bactrim as well (toxo donor +). Repeat CBC in 1 week. Will decrease Prednisone per protocol pending biopsy.   - Encouraged cardiac rehab when he is 6 weeks postop.     Leukopenia and Anemia. No evidence for acute infection or acute blood loss.   - Discussed decreasing Cellcept with Dr. Sheridan in setting of marrow suppression, will await his recommendation given lower Tac level today. Remains on Valcyte CMV: D+/R+ and Bactrim as well (toxo donor +). Repeat CBC in 1 week. Will decrease Prednisone per protocol pending biopsy.   - Repeat CBC in 1 week.     LE edema. Filling pressures stable, defer diuretic increase.   - Encouraged protein intake, TEDS, and activity.     RIJ and RUE DVT, provoked. US 7/22/20 consistent with nonocclusive thrombus in the right internal jugular vein along the intravenous catheter demonstrated, nonocclusive thrombus along the right PICC line in the right  axillary vein demonstrated, and occlusive thrombus in the superficial right cephalic vein demonstrated as above.  - Given HIT history continue Fondaparinux, hold morning of biopsies.     Donor Streptococcus salivarius bacteremia  - transplant ID consult 7/21, treated with ceftriaxone. Course  complete.    Follow up CBC in 1 week and RHC/biopsy 8/31 as scheduled. Repeat Tac level per protocol.     Julia Berger, APRN CNP  8/17/2020          CC  SHONDA MERCHANT

## 2020-08-17 NOTE — PROGRESS NOTES
[facility]   Interventional Cardiology         Consenting/Education for Right Heart Catheterization/Endomyocardial Biopsy     Patient understands as a part of routine surveillance after heart transplant we would like to perform coronary angiogram/right heart catheterization/endomyocardial biopsy.  This procedure will be performed by Dr. Hudson     Patient understands during the portion for right heart catheterization a fine tube (catheter) is put into the vein of the groin/neck.  It is carefully passed along until it reaches the heart and then goes up into the blood vessels of the lungs. This is done to measure a variety of pressures in your heart and can tell us how well the heart is filling and emptying, as well as monitor fluid status. While the catheter is in your neck/groin vein, a  Biopsy forceps  or pincers at the end of the catheter are used to take the tissue samples. This is may be repeated to get enough samples. The biopsy pieces are very small (one to two millimeters).      Patient also understands risks and complications of the procedure which include, but are not limited to bruising/swelling around the incision site, infection, bleeding, allergic reaction to local anesthetic, air embolism, arterial puncture, stroke, heart attack.       Patient verbalized understanding of risks and benefits of the right heart catheterization, and endomyocardial biopsy and has elected to proceed with the procedure.         Aleta Hernandez MD  Cardiology Fellow

## 2020-08-17 NOTE — DISCHARGE INSTRUCTIONS
[facility]                        Interventional Cardiology  Discharge Instructions   Post Right Heart Cath      AFTER YOU GO HOME:    DO drink plenty of fluids    DO resume your regular diet and medications unless otherwise instructed by your Primary Physician    Do Not scrub the procedure site vigorously    No lotion or powder to the puncture site for 3 days    CALL YOUR PRIMARY PHYSICIAN IF: You may resume all normal activity.  Monitor neck site for bleeding, swelling, or voice changes. If you notice bleeding or swelling immediately apply pressure to the site and call number below to speak with Cardiology Fellow.  If you experience any changes in your breathing you should call your doctor immediately or come to the closest Emergency Department.  Do not drive yourself.    ADDITIONAL INSTRUCTIONS: Medications: You are to resume all home medications including anticoagulation therapy unless otherwise advised by your primary cardiologist or nurse coordinator.    Follow Up: Per your primary cardiology team    If you have any questions or concerns regarding your procedure site please call [phone] at anytime and ask for Cardiology Fellow on call.  They are available 24 hours a day.  You may also contact the Cardiology Clinic after hours number at [phone].                                                       Telephone Numbers [phone] Monday-Friday 8:00 am to 4:30 pm    [phone] After 4:30 pm Monday-Friday, Weekends & Holidays  Ask for Interventional Cardiologist on call. Someone is on call 24 hours/day   [facility] toll free number [phone] Monday-Friday 8:00 am to 4:30 pm   [facility] Emergency Dept [phone]

## 2020-08-17 NOTE — PROGRESS NOTES
Pt on 2A post right heart cath; pt awake and alert, denies pain. Left neck site is flat, soft and dry. Wife at bedside. VENKAT Maya notified pt was back, Maya reports already saw pt; pt can discharge to home. Bedside glucose is 251; tolerated PO, food and drink. Discharge instructions reviewed with pt; stated understanding; copy to pt. Teaching done on prep for next appointment re when to eat, how much insulin and when to take medications. 1610--discharged to [facility] per wheelchair (per pt's request) with wife.

## 2020-08-17 NOTE — Clinical Note
dry, intact, no bleeding and no hematoma. 7fr LIJ removed, manual pressure applied, hemostasis achieved, bandage placed.

## 2020-08-17 NOTE — PROGRESS NOTES
Pt prepped for right heart cath and biopsy.LMX cream applied to neck left side of neck pt stated.Consent signed and questions answered with wife at bedside.Pt declined a [place] interpretor. Notified cardiac fellow that his glucose was elevated at 391.

## 2020-08-18 ENCOUNTER — VIRTUAL VISIT (OUTPATIENT)
Dept: ENDOCRINOLOGY | Facility: CLINIC | Age: 65
End: 2020-08-18
Payer: COMMERCIAL

## 2020-08-18 ENCOUNTER — TELEPHONE (OUTPATIENT)
Dept: TRANSPLANT | Facility: CLINIC | Age: 65
End: 2020-08-18

## 2020-08-18 DIAGNOSIS — Z79.4 TYPE 2 DIABETES MELLITUS WITH DIABETIC NEPHROPATHY, WITH LONG-TERM CURRENT USE OF INSULIN (H): Chronic | ICD-10-CM

## 2020-08-18 DIAGNOSIS — Z94.1 HEART TRANSPLANT, ORTHOTOPIC, STATUS (H): Primary | ICD-10-CM

## 2020-08-18 DIAGNOSIS — Z94.1 HEART TRANSPLANT, ORTHOTOPIC, STATUS (H): ICD-10-CM

## 2020-08-18 DIAGNOSIS — E11.65 TYPE 2 DIABETES MELLITUS WITH HYPERGLYCEMIA, WITH LONG-TERM CURRENT USE OF INSULIN (H): Primary | ICD-10-CM

## 2020-08-18 DIAGNOSIS — E11.21 TYPE 2 DIABETES MELLITUS WITH DIABETIC NEPHROPATHY, WITH LONG-TERM CURRENT USE OF INSULIN (H): Chronic | ICD-10-CM

## 2020-08-18 DIAGNOSIS — Z79.4 TYPE 2 DIABETES MELLITUS WITH HYPERGLYCEMIA, WITH LONG-TERM CURRENT USE OF INSULIN (H): Primary | ICD-10-CM

## 2020-08-18 LAB
CMV DNA SPEC NAA+PROBE-ACNC: NORMAL [IU]/ML
CMV DNA SPEC NAA+PROBE-LOG#: NORMAL {LOG_IU}/ML
COPATH REPORT: NORMAL
SPECIMEN SOURCE: NORMAL

## 2020-08-18 RX ORDER — ROSUVASTATIN CALCIUM 10 MG/1
10 TABLET, COATED ORAL DAILY
Qty: 90 TABLET | Refills: 3 | Status: SHIPPED | OUTPATIENT
Start: 2020-08-18 | End: 2021-06-07

## 2020-08-18 RX ORDER — GLIMEPIRIDE 2 MG/1
6 TABLET ORAL
Qty: 270 TABLET | Refills: 3 | Status: ON HOLD | OUTPATIENT
Start: 2020-08-18 | End: 2020-08-28

## 2020-08-18 RX ORDER — VALGANCICLOVIR 450 MG/1
450 TABLET, FILM COATED ORAL DAILY
Qty: 30 TABLET | Refills: 1 | Status: ON HOLD | OUTPATIENT
Start: 2020-08-18 | End: 2020-08-28

## 2020-08-18 RX ORDER — TAMSULOSIN HYDROCHLORIDE 0.4 MG/1
0.4 CAPSULE ORAL DAILY
Qty: 30 CAPSULE | Refills: 11 | Status: ON HOLD | OUTPATIENT
Start: 2020-08-18 | End: 2020-10-05

## 2020-08-18 RX ORDER — MYCOPHENOLATE MOFETIL 250 MG/1
1500 CAPSULE ORAL 2 TIMES DAILY
Qty: 360 CAPSULE | Refills: 3 | Status: SHIPPED | OUTPATIENT
Start: 2020-08-18 | End: 2020-09-01

## 2020-08-18 RX ORDER — TACROLIMUS 1 MG/1
CAPSULE ORAL
Qty: 330 CAPSULE | Refills: 3 | Status: ON HOLD | OUTPATIENT
Start: 2020-08-18 | End: 2020-08-28

## 2020-08-18 RX ORDER — FLURBIPROFEN SODIUM 0.3 MG/ML
1 SOLUTION/ DROPS OPHTHALMIC 2 TIMES DAILY
Qty: 100 EACH | Refills: 3 | Status: SHIPPED | OUTPATIENT
Start: 2020-08-18 | End: 2021-10-19

## 2020-08-18 RX ORDER — PANTOPRAZOLE SODIUM 40 MG/1
40 TABLET, DELAYED RELEASE ORAL
Qty: 90 TABLET | Refills: 3 | Status: SHIPPED | OUTPATIENT
Start: 2020-08-18 | End: 2020-12-10

## 2020-08-18 RX ORDER — SULFAMETHOXAZOLE AND TRIMETHOPRIM 400; 80 MG/1; MG/1
1 TABLET ORAL DAILY
Qty: 90 TABLET | Refills: 3 | Status: SHIPPED | OUTPATIENT
Start: 2020-08-18 | End: 2021-08-26

## 2020-08-18 RX ORDER — FLASH GLUCOSE SENSOR
KIT MISCELLANEOUS
Qty: 6 EACH | Refills: 4 | Status: SHIPPED | OUTPATIENT
Start: 2020-08-18 | End: 2023-01-17

## 2020-08-18 RX ORDER — NYSTATIN 100000/ML
1000000 SUSPENSION, ORAL (FINAL DOSE FORM) ORAL 4 TIMES DAILY
Qty: 800 ML | Refills: 4 | Status: ON HOLD | OUTPATIENT
Start: 2020-08-18 | End: 2020-08-28

## 2020-08-18 RX ORDER — AMLODIPINE BESYLATE 5 MG/1
5 TABLET ORAL DAILY
Qty: 90 TABLET | Refills: 3 | Status: ON HOLD | OUTPATIENT
Start: 2020-08-18 | End: 2020-08-28

## 2020-08-18 NOTE — TELEPHONE ENCOUNTER
Pt called and resulted testing/labs reviewed from yesterday's cath lab visit.      1.  Tacrolimus level 5.5.  Goal 8-10.  13 hour trough per pt. Creat 1.63.  Spoke with Dr. Hogan regarding dosing.  Pt to increase dose to 6mg in the AM and 5mg in the PM- recheck in one week.  2. BP elevated at yesterday's visit.  Dr. Bob would like pt to start taking 5mg of Norvasc daily- [facility]    3.  Pt states he is running low on medications.  Refills for all meds (accept Diabetic meds that were filled by End[facility] yesterday) sent to [facility].    Pt states understanding all instructions and plan of care.  Pt was called using a  over the phone.

## 2020-08-18 NOTE — PROGRESS NOTES
"Lucian Henderson Sr. is a 64 year old male who is being evaluated via a billable telephone visit.      The patient has been notified of following:     \"This telephone visit will be conducted via a call between you and your physician/provider. We have found that certain health care needs can be provided without the need for a physical exam.  This service lets us provide the care you need with a short phone conversation.  If a prescription is necessary we can send it directly to your pharmacy.  If lab work is needed we can place an order for that and you can then stop by our lab to have the test done at a later time.    Telephone visits are billed at different rates depending on your insurance coverage. During this emergency period, for some insurers they may be billed the same as an in-person visit.  Please reach out to your insurance provider with any questions.    If during the course of the call the physician/provider feels a telephone visit is not appropriate, you will not be charged for this service.\"    Patient has given verbal consent for Video  visit?  Yes    What phone number would you like to be contacted at? [phone]    How would you like to obtain your AVS? Boris Snowden MA    Diabetes Virtual Consult Note  Marisabel Prieto PA-C  August 18, 2020    Lucian Henderson Sr. is a 64 year old male with a past medical history including  has a past medical history of CAD (coronary artery disease), CHF (congestive heart failure) (H), CKD (chronic kidney disease), stage III (H), Cortical cataract of both eyes, Diabetes (H), Hyperlipidemia, Hypertension, Ischemic cardiomyopathy, Obesity, CHANTEL (obstructive sleep apnea), and Osteoarthritis. He also has no past medical history of Chronic infection, Complication of anesthesia, COPD (chronic obstructive pulmonary disease) (H), Heart murmur, Irregular heart beat, Liver disease, Other chronic pain, or Uncomplicated asthma.    He is here for f/u " of diabetes.      He was admitted to [facility] 07/03 for heart failure exacerbation with cardiogenic shock, underwent right subclavian IABP insertion 07/16 with Dr. Sparrow, and then heart transplant 07/20 with Dr. Griselli.  He was hospitalized 31 days through 8/3/2020.      Patient was started on ASA, crestor. Hydralazine was started for hypertension, this was titrated to 75 mg TID at time of discharge. Patient was diuresed with IV lasix and will be discharged on 40 mg PO in the am, 20 mg PO in the afternoon. Chest tubes were removed when drainage decreased and follow-up CXR was negative for any significant pneumothorax. Terbutaline was weaned off prior to discharge and TPW were removed when appropriate. Immunosuppression per cardiology, was discharged on tacrolimus 4 mg PO BID and prednisone 20 mg BID.     In regards to his diabetes, he was discharged on Januvia 100 mg qam and Glimepiride 6 mg with breakfast, along with NPH 35U at 8 am with breakfast and Prednisone, along with meal time sliding scale insulin 1:30 >140.    PTA, he was taking 48 units daily Lantus, together with Januvia and Amaryl PTA.  He had tried substituting Bydureon for Januvia but had loose stools, bloating abdominal pain and did not like it.  He stopped and resumed Januvia after perhaps one month.    He is worried abut his BG.  He awakes each morning, checks his BG and gives 35 - 40  units of NPH insulin bid.  He is taking Prednisone 20 mg in the morning and  mg in the evening.      He has been not been able to use his hands because he does not cannot get needles on top.    We reviewed glucometer, pump and CGMS data together.  It revealed:  08/11/2020  263     08/12/2020  188     08/13/2020  223     08/14/2020  283     08/15/2020  315     08/16/2020  463     08/17/2020  533 (no meds this day)     08/18/2020  460      All fasting.        Janice  has a past medical history of CAD (coronary artery disease), CHF (congestive heart failure) (H), CKD  (chronic kidney disease), stage III (H), Cortical cataract of both eyes, Diabetes (H), Hyperlipidemia, Hypertension, Ischemic cardiomyopathy, Obesity, CHANTEL (obstructive sleep apnea), and Osteoarthritis. He also has no past medical history of Chronic infection, Complication of anesthesia, COPD (chronic obstructive pulmonary disease) (H), Heart murmur, Irregular heart beat, Liver disease, Other chronic pain, or Uncomplicated asthma.  Current Outpatient Medications   Medication     acetaminophen (TYLENOL) 325 MG tablet     Alcohol Swabs PADS     blood glucose (ACCU-CHEK SMARTVIEW) test strip     blood glucose (NO BRAND SPECIFIED) test strip     blood glucose monitoring (ACCU-CHEK FELIPE SMARTVIEW) meter device kit     blood glucose monitoring (ONE TOUCH DELICA) lancets     calcium carbonate 600 mg-vitamin D 400 units (CALTRATE) 600-400 MG-UNIT per tablet     fondaparinux ANTICOAGULANT (ARIXTRA) 7.5MG/0.6ML injection     furosemide (LASIX) 20 MG tablet     glimepiride (AMARYL) 2 MG tablet     hydrALAZINE (APRESOLINE) 25 MG tablet     insulin aspart (NOVOLOG PEN) 100 UNIT/ML pen     insulin isophane human (HUMULIN N PEN) 100 UNIT/ML injection     insulin pen needle (32G X 4 MM) 32G X 4 MM miscellaneous     insulin pen needle (B-D U/F) 31G X 5 MM miscellaneous     methocarbamol (ROBAXIN) 500 MG tablet     mycophenolate (GENERIC EQUIVALENT) 250 MG capsule     nystatin (MYCOSTATIN) 805405 UNIT/ML suspension     order for DME     pantoprazole (PROTONIX) 40 MG EC tablet     polyethylene glycol (MIRALAX) 17 g packet     predniSONE (DELTASONE) 10 MG tablet     rosuvastatin (CRESTOR) 10 MG tablet     senna-docusate (SENOKOT-S/PERICOLACE) 8.6-50 MG tablet     sitagliptin (JANUVIA) 100 MG tablet     sulfamethoxazole-trimethoprim (BACTRIM) 400-80 MG tablet     tacrolimus (GENERIC EQUIVALENT) 0.5 MG capsule     tacrolimus (GENERIC EQUIVALENT) 1 MG capsule     tamsulosin (FLOMAX) 0.4 MG capsule     valGANciclovir (VALCYTE) 450 MG tablet  "    No current facility-administered medications for this visit.               Lucian's family history includes Diabetes in his brother, sister, and sister.    ROS:   Patient denies any fevers, chills or sweats as well as any changes in or problems with vision new or different headaches.  Patient denies symptoms of hypoglycemia except as above.  He is having fatigue and polyuria polydipsia and his blood sugars are above 400.  Patients denies marked fatigue, cough, shortness of breath, chest pain or pressure.  He does have chest pain from recent surgery this is improving.  There has been no pain with or other changes in urination or  itching or pain in genital areas.  Patient denies any noted swelling in feet, ankles or otherwise, loss of sensation or pain  in feet or other areas.    Patient also denies current difficulties with depressed mood, anhedonia or worrying too much.        Exam:    VIDEO VISIT    Lucian is alerted and oriented.  He is present with his wife  Mood is \"good,\" affect is congruent.  Thoughtful form and content are fluid and coherent.  No signs of distress are appreciated.    It appears that he has at least one NovoLog FlexPen at home however there is an orange apparatus on the end of it that he does not seem to be able to remove.  He and his wife tried to get the pen needles that they have onto both the NovoLog and the Humalog pens and were unable to they do fit the NPH pens.    Lucian does appear a bit pale.      Data:      Most recent:  Lab Results   Component Value Date    CR 1.67 08/17/2020     Lab Results   Component Value Date     08/17/2020       GFR Estimate   Date Value Ref Range Status   08/17/2020 42 (L) >60 mL/min/[1.73_m2] Final     Comment:     Non  GFR Calc  Starting 12/18/2018, serum creatinine based estimated GFR (eGFR) will be   calculated using the Chronic Kidney Disease Epidemiology Collaboration   (CKD-EPI) equation.     08/13/2020 39 (L) >60 " mL/min/[1.73_m2] Final     Comment:     Non  GFR Calc  Starting 12/18/2018, serum creatinine based estimated GFR (eGFR) will be   calculated using the Chronic Kidney Disease Epidemiology Collaboration   (CKD-EPI) equation.     08/10/2020 39 (L) >60 mL/min/[1.73_m2] Final     Comment:     Non  GFR Calc  Starting 12/18/2018, serum creatinine based estimated GFR (eGFR) will be   calculated using the Chronic Kidney Disease Epidemiology Collaboration   (CKD-EPI) equation.       GFR Estimate If Black   Date Value Ref Range Status   08/17/2020 48 (L) >60 mL/min/[1.73_m2] Final     Comment:      GFR Calc  Starting 12/18/2018, serum creatinine based estimated GFR (eGFR) will be   calculated using the Chronic Kidney Disease Epidemiology Collaboration   (CKD-EPI) equation.     08/13/2020 46 (L) >60 mL/min/[1.73_m2] Final     Comment:      GFR Calc  Starting 12/18/2018, serum creatinine based estimated GFR (eGFR) will be   calculated using the Chronic Kidney Disease Epidemiology Collaboration   (CKD-EPI) equation.     08/10/2020 45 (L) >60 mL/min/[1.73_m2] Final     Comment:      GFR Calc  Starting 12/18/2018, serum creatinine based estimated GFR (eGFR) will be   calculated using the Chronic Kidney Disease Epidemiology Collaboration   (CKD-EPI) equation.           Lab Results   Component Value Date    A1C 8.2 (H) 07/03/2020    A1C 7.9 (H) 07/08/2019    A1C 8.5 (H) 03/11/2019    A1C 7.6 (H) 10/16/2018    A1C 9.8 (H) 09/06/2017    HEMOGLOBINA1 8.3 (A) 08/06/2018    HEMOGLOBINA1 10.6 (A) 04/30/2018     Lab Results   Component Value Date    MICROL 6 10/03/2018     No results found for: MICROALBUMIN  No results found for: CPEPT, GADAB, ISCAB  Cholesterol   Date Value Ref Range Status   08/17/2020 118 <200 mg/dL Final   07/08/2019 104 <200 mg/dL Final     HDL Cholesterol   Date Value Ref Range Status   08/17/2020 75 >39 mg/dL Final   07/08/2019 27 (L) >39  mg/dL Final     LDL Cholesterol Calculated   Date Value Ref Range Status   08/17/2020 26 <100 mg/dL Final     Comment:     Desirable:       <100 mg/dl   07/08/2019 41 <100 mg/dL Final     Comment:     Desirable:       <100 mg/dl     Triglycerides   Date Value Ref Range Status   08/17/2020 82 <150 mg/dL Final   07/08/2019 181 (H) <150 mg/dL Final     Comment:     Borderline high:  150-199 mg/dl  High:             200-499 mg/dl  Very high:       >499 mg/dl       Cholesterol/HDL Ratio   Date Value Ref Range Status   06/27/2015 2.6 0.0 - 5.0 Final   01/11/2015 6.0 (H) 0.0 - 5.0 Final                   Assessment/Plan:    Lucian is a 64 year old male with  has a past medical history of CAD (coronary artery disease), CHF (congestive heart failure) (H), CKD (chronic kidney disease), stage III (H), Cortical cataract of both eyes, Diabetes (H), Hyperlipidemia, Hypertension, Ischemic cardiomyopathy, Obesity, CHANTEL (obstructive sleep apnea), and Osteoarthritis. He also has no past medical history of Chronic infection, Complication of anesthesia, COPD (chronic obstructive pulmonary disease) (H), Heart murmur, Irregular heart beat, Liver disease, Other chronic pain, or Uncomplicated asthma.        Jose is a 64-year-old man who is one-month status post heart transplant with uncontrolled type 2 diabetes exacerbated by steroids.  Lack of and control is in part due to lack of supplies.  His glucometer is not working reliably at times he checks his blood and no number appears,  and he does not have the correct needles for his Humalog KwikPen.  He also has 1 NovoLog FlexPen at home.  He also has insufficient education to know how to use these.    I am referring him urgently to the CDE to learn to attach needles to the pens, and also to learn to use the elaina sensor.    I will follow-up with him in week to further adjust his insulin.    For now I am advising him to increase his morning NPH to 45 units and if he is still having no lows  in the next 2 days to further increase that to 50 particular if numbers in the evening are over 200 he will continue to use 35 units of NPH if his evening numbers under 240 if it is over 200.        >50% of 45 minute visit spent in counseling, education and coordination of care related to healthy lifestyle, prevention of diabetic complications, options for better glycemic control as well as preventing, detecting, and treating hypoglycemia.      It is my privilege to be involved in the care of the above patient.     Marisabel Prieto PA-C, MPAS  [facility]  Diabetes, Endocrinology, and Metabolism  [phone] Appointments/Nurse  [phone] pager  [phone] nurse line    This note was completed in part using Dragon voice recognition, and may contain word and grammatical errors.

## 2020-08-18 NOTE — LETTER
"8/18/2020       RE: Lucian Henderson Sr.  [address] [address]     Dear Colleague,    Thank you for referring your patient, Lucian Henderson Sr., to the [facility] ENDOCRINOLOGY at [facility]. Please see a copy of my visit note below.    Lucian Henderson Sr. is a 64 year old male who is being evaluated via a billable telephone visit.      The patient has been notified of following:     \"This telephone visit will be conducted via a call between you and your physician/provider. We have found that certain health care needs can be provided without the need for a physical exam.  This service lets us provide the care you need with a short phone conversation.  If a prescription is necessary we can send it directly to your pharmacy.  If lab work is needed we can place an order for that and you can then stop by our lab to have the test done at a later time.    Telephone visits are billed at different rates depending on your insurance coverage. During this emergency period, for some insurers they may be billed the same as an in-person visit.  Please reach out to your insurance provider with any questions.    If during the course of the call the physician/provider feels a telephone visit is not appropriate, you will not be charged for this service.\"    Patient has given verbal consent for Video  visit?  Yes    What phone number would you like to be contacted at? [phone]    How would you like to obtain your AVS? Boris Snowden MA    Diabetes Virtual Consult Note  Marisabel Prieto PA-C  August 18, 2020    Lucian Henderson Sr. is a 64 year old male with a past medical history including  has a past medical history of CAD (coronary artery disease), CHF (congestive heart failure) (H), CKD (chronic kidney disease), stage III (H), Cortical cataract of both eyes, Diabetes (H), Hyperlipidemia, Hypertension, Ischemic cardiomyopathy, " Obesity, CHANTEL (obstructive sleep apnea), and Osteoarthritis. He also has no past medical history of Chronic infection, Complication of anesthesia, COPD (chronic obstructive pulmonary disease) (H), Heart murmur, Irregular heart beat, Liver disease, Other chronic pain, or Uncomplicated asthma.    He is here for f/u of diabetes.      He was admitted to [facility] 07/03 for heart failure exacerbation with cardiogenic shock, underwent right subclavian IABP insertion 07/16 with Dr. Sparrow, and then heart transplant 07/20 with Dr. Griselli.  He was hospitalized 31 days through 8/3/2020.      Patient was started on ASA, crestor. Hydralazine was started for hypertension, this was titrated to 75 mg TID at time of discharge. Patient was diuresed with IV lasix and will be discharged on 40 mg PO in the am, 20 mg PO in the afternoon. Chest tubes were removed when drainage decreased and follow-up CXR was negative for any significant pneumothorax. Terbutaline was weaned off prior to discharge and TPW were removed when appropriate. Immunosuppression per cardiology, was discharged on tacrolimus 4 mg PO BID and prednisone 20 mg BID.     In regards to his diabetes, he was discharged on Januvia 100 mg qam and Glimepiride 6 mg with breakfast, along with NPH 35U at 8 am with breakfast and Prednisone, along with meal time sliding scale insulin 1:30 >140.    PTA, he was taking 48 units daily Lantus, together with Januvia and Amaryl PTA.  He had tried substituting Bydureon for Januvia but had loose stools, bloating abdominal pain and did not like it.  He stopped and resumed Januvia after perhaps one month.    He is worried abut his BG.  He awakes each morning, checks his BG and gives 35 - 40  units of NPH insulin bid.  He is taking Prednisone 20 mg in the morning and  mg in the evening.      He has been not been able to use his hands because he does not cannot get needles on top.    We reviewed glucometer, pump and CGMS data together.  It  revealed:  08/11/2020  263     08/12/2020  188     08/13/2020  223     08/14/2020  283     08/15/2020  315     08/16/2020  463     08/17/2020  533 (no meds this day)     08/18/2020  460      All fasting.        Janice  has a past medical history of CAD (coronary artery disease), CHF (congestive heart failure) (H), CKD (chronic kidney disease), stage III (H), Cortical cataract of both eyes, Diabetes (H), Hyperlipidemia, Hypertension, Ischemic cardiomyopathy, Obesity, CHANTEL (obstructive sleep apnea), and Osteoarthritis. He also has no past medical history of Chronic infection, Complication of anesthesia, COPD (chronic obstructive pulmonary disease) (H), Heart murmur, Irregular heart beat, Liver disease, Other chronic pain, or Uncomplicated asthma.  Current Outpatient Medications   Medication     acetaminophen (TYLENOL) 325 MG tablet     Alcohol Swabs PADS     blood glucose (ACCU-CHEK SMARTVIEW) test strip     blood glucose (NO BRAND SPECIFIED) test strip     blood glucose monitoring (ACCU-CHEK FELIPE SMARTVIEW) meter device kit     blood glucose monitoring (ONE TOUCH DELICA) lancets     calcium carbonate 600 mg-vitamin D 400 units (CALTRATE) 600-400 MG-UNIT per tablet     fondaparinux ANTICOAGULANT (ARIXTRA) 7.5MG/0.6ML injection     furosemide (LASIX) 20 MG tablet     glimepiride (AMARYL) 2 MG tablet     hydrALAZINE (APRESOLINE) 25 MG tablet     insulin aspart (NOVOLOG PEN) 100 UNIT/ML pen     insulin isophane human (HUMULIN N PEN) 100 UNIT/ML injection     insulin pen needle (32G X 4 MM) 32G X 4 MM miscellaneous     insulin pen needle (B-D U/F) 31G X 5 MM miscellaneous     methocarbamol (ROBAXIN) 500 MG tablet     mycophenolate (GENERIC EQUIVALENT) 250 MG capsule     nystatin (MYCOSTATIN) 113344 UNIT/ML suspension     order for DME     pantoprazole (PROTONIX) 40 MG EC tablet     polyethylene glycol (MIRALAX) 17 g packet     predniSONE (DELTASONE) 10 MG tablet     rosuvastatin (CRESTOR) 10 MG tablet     senna-docusate  "(SENOKOT-S/PERICOLACE) 8.6-50 MG tablet     sitagliptin (JANUVIA) 100 MG tablet     sulfamethoxazole-trimethoprim (BACTRIM) 400-80 MG tablet     tacrolimus (GENERIC EQUIVALENT) 0.5 MG capsule     tacrolimus (GENERIC EQUIVALENT) 1 MG capsule     tamsulosin (FLOMAX) 0.4 MG capsule     valGANciclovir (VALCYTE) 450 MG tablet     No current facility-administered medications for this visit.               Lucian's family history includes Diabetes in his brother, sister, and sister.    ROS:   Patient denies any fevers, chills or sweats as well as any changes in or problems with vision new or different headaches.  Patient denies symptoms of hypoglycemia except as above.  He is having fatigue and polyuria polydipsia and his blood sugars are above 400.  Patients denies marked fatigue, cough, shortness of breath, chest pain or pressure.  He does have chest pain from recent surgery this is improving.  There has been no pain with or other changes in urination or  itching or pain in genital areas.  Patient denies any noted swelling in feet, ankles or otherwise, loss of sensation or pain  in feet or other areas.    Patient also denies current difficulties with depressed mood, anhedonia or worrying too much.        Exam:    VIDEO VISIT    Lucian is alerted and oriented.  He is present with his wife  Mood is \"good,\" affect is congruent.  Thoughtful form and content are fluid and coherent.  No signs of distress are appreciated.    It appears that he has at least one NovoLog FlexPen at home however there is an orange apparatus on the end of it that he does not seem to be able to remove.  He and his wife tried to get the pen needles that they have onto both the NovoLog and the Humalog pens and were unable to they do fit the NPH pens.    Lucian does appear a bit pale.      Data:      Most recent:  Lab Results   Component Value Date    CR 1.67 08/17/2020     Lab Results   Component Value Date     08/17/2020       GFR Estimate "   Date Value Ref Range Status   08/17/2020 42 (L) >60 mL/min/[1.73_m2] Final     Comment:     Non  GFR Calc  Starting 12/18/2018, serum creatinine based estimated GFR (eGFR) will be   calculated using the Chronic Kidney Disease Epidemiology Collaboration   (CKD-EPI) equation.     08/13/2020 39 (L) >60 mL/min/[1.73_m2] Final     Comment:     Non  GFR Calc  Starting 12/18/2018, serum creatinine based estimated GFR (eGFR) will be   calculated using the Chronic Kidney Disease Epidemiology Collaboration   (CKD-EPI) equation.     08/10/2020 39 (L) >60 mL/min/[1.73_m2] Final     Comment:     Non  GFR Calc  Starting 12/18/2018, serum creatinine based estimated GFR (eGFR) will be   calculated using the Chronic Kidney Disease Epidemiology Collaboration   (CKD-EPI) equation.       GFR Estimate If Black   Date Value Ref Range Status   08/17/2020 48 (L) >60 mL/min/[1.73_m2] Final     Comment:      GFR Calc  Starting 12/18/2018, serum creatinine based estimated GFR (eGFR) will be   calculated using the Chronic Kidney Disease Epidemiology Collaboration   (CKD-EPI) equation.     08/13/2020 46 (L) >60 mL/min/[1.73_m2] Final     Comment:      GFR Calc  Starting 12/18/2018, serum creatinine based estimated GFR (eGFR) will be   calculated using the Chronic Kidney Disease Epidemiology Collaboration   (CKD-EPI) equation.     08/10/2020 45 (L) >60 mL/min/[1.73_m2] Final     Comment:      GFR Calc  Starting 12/18/2018, serum creatinine based estimated GFR (eGFR) will be   calculated using the Chronic Kidney Disease Epidemiology Collaboration   (CKD-EPI) equation.           Lab Results   Component Value Date    A1C 8.2 (H) 07/03/2020    A1C 7.9 (H) 07/08/2019    A1C 8.5 (H) 03/11/2019    A1C 7.6 (H) 10/16/2018    A1C 9.8 (H) 09/06/2017    HEMOGLOBINA1 8.3 (A) 08/06/2018    HEMOGLOBINA1 10.6 (A) 04/30/2018     Lab Results   Component Value Date     MICROL 6 10/03/2018     No results found for: MICROALBUMIN  No results found for: CPEPT, GADAB, ISCAB  Cholesterol   Date Value Ref Range Status   08/17/2020 118 <200 mg/dL Final   07/08/2019 104 <200 mg/dL Final     HDL Cholesterol   Date Value Ref Range Status   08/17/2020 75 >39 mg/dL Final   07/08/2019 27 (L) >39 mg/dL Final     LDL Cholesterol Calculated   Date Value Ref Range Status   08/17/2020 26 <100 mg/dL Final     Comment:     Desirable:       <100 mg/dl   07/08/2019 41 <100 mg/dL Final     Comment:     Desirable:       <100 mg/dl     Triglycerides   Date Value Ref Range Status   08/17/2020 82 <150 mg/dL Final   07/08/2019 181 (H) <150 mg/dL Final     Comment:     Borderline high:  150-199 mg/dl  High:             200-499 mg/dl  Very high:       >499 mg/dl       Cholesterol/HDL Ratio   Date Value Ref Range Status   06/27/2015 2.6 0.0 - 5.0 Final   01/11/2015 6.0 (H) 0.0 - 5.0 Final                   Assessment/Plan:    Lucian is a 64 year old male with  has a past medical history of CAD (coronary artery disease), CHF (congestive heart failure) (H), CKD (chronic kidney disease), stage III (H), Cortical cataract of both eyes, Diabetes (H), Hyperlipidemia, Hypertension, Ischemic cardiomyopathy, Obesity, CHANTEL (obstructive sleep apnea), and Osteoarthritis. He also has no past medical history of Chronic infection, Complication of anesthesia, COPD (chronic obstructive pulmonary disease) (H), Heart murmur, Irregular heart beat, Liver disease, Other chronic pain, or Uncomplicated asthma.        Jose is a 64-year-old man who is one-month status post heart transplant with uncontrolled type 2 diabetes exacerbated by steroids.  Lack of and control is in part due to lack of supplies.  His glucometer is not working reliably at times he checks his blood and no number appears,  and he does not have the correct needles for his Humalog KwikPen.  He also has 1 NovoLog FlexPen at home.  He also has insufficient education to  know how to use these.    I am referring him urgently to the CDE to learn to attach needles to the pens, and also to learn to use the elaina sensor.    I will follow-up with him in week to further adjust his insulin.    For now I am advising him to increase his morning NPH to 45 units and if he is still having no lows in the next 2 days to further increase that to 50 particular if numbers in the evening are over 200 he will continue to use 35 units of NPH if his evening numbers under 240 if it is over 200.        >50% of 45 minute visit spent in counseling, education and coordination of care related to healthy lifestyle, prevention of diabetic complications, options for better glycemic control as well as preventing, detecting, and treating hypoglycemia.      It is my privilege to be involved in the care of the above patient.     Marisabel Prieto PA-C, MPAS  [facility]  Diabetes, Endocrinology, and Metabolism  [phone] Appointments/Nurse  [phone] pager  [phone] nurse line    This note was completed in part using Dragon voice recognition, and may contain word and grammatical errors.

## 2020-08-18 NOTE — PATIENT INSTRUCTIONS
For now I am advising him to increase his morning NPH to 45 units and if he is still having no lows in the next 2 days to further increase that to 50 particular if numbers in the evening are over 200 he will continue to use 35 units of NPH if his evening numbers under 240 if it is over 200.    Please see the diabetic educator this week.    My best wishes,    Marisabel Prieto PA-C, MPAS  [facility]  Diabetes, Endocrinology, and Metabolism  [phone] Appointments/Nurse  [phone] Fax  [phone] [facility]  [phone] [place]  [phone] URGENTafter hours/weekend Endocrinologist on call

## 2020-08-18 NOTE — PROGRESS NOTES
08/11/2020  263    08/12/2020  188    08/13/2020  223    08/14/2020  283    08/15/2020  315    08/16/2020  463    08/17/2020  533 (no meds this day)    08/18/2020  460

## 2020-08-19 ENCOUNTER — TELEPHONE (OUTPATIENT)
Dept: TRANSPLANT | Facility: CLINIC | Age: 65
End: 2020-08-19

## 2020-08-19 ENCOUNTER — APPOINTMENT (OUTPATIENT)
Dept: INTERPRETER SERVICES | Facility: CLINIC | Age: 65
End: 2020-08-19
Payer: COMMERCIAL

## 2020-08-19 DIAGNOSIS — Z94.1 HEART TRANSPLANT, ORTHOTOPIC, STATUS (H): ICD-10-CM

## 2020-08-19 LAB
DONOR IDENTIFICATION: NORMAL
DSA COMMENTS: NORMAL
DSA PRESENT: NO
DSA TEST METHOD: NORMAL
EBV DNA # SPEC NAA+PROBE: 766 {COPIES}/ML
EBV DNA SPEC NAA+PROBE-LOG#: 2.9 {LOG_COPIES}/ML
ORGAN: NORMAL
SA1 CELL: NORMAL
SA1 COMMENTS: NORMAL
SA1 HI RISK ABY: NORMAL
SA1 MOD RISK ABY: NORMAL
SA1 TEST METHOD: NORMAL
SA2 CELL: NORMAL
SA2 COMMENTS: NORMAL
SA2 HI RISK ABY UA: NORMAL
SA2 MOD RISK ABY: NORMAL
SA2 TEST METHOD: NORMAL
UNACCEPTABLE ANTIGEN: NORMAL
UNOS CPRA: 0

## 2020-08-19 RX ORDER — PREDNISONE 10 MG/1
TABLET ORAL
Qty: 180 TABLET | Refills: 1 | Status: SHIPPED | OUTPATIENT
Start: 2020-08-19 | End: 2020-09-01

## 2020-08-21 ENCOUNTER — TELEPHONE (OUTPATIENT)
Dept: TRANSPLANT | Facility: CLINIC | Age: 65
End: 2020-08-21

## 2020-08-21 ENCOUNTER — HOSPITAL ENCOUNTER (OUTPATIENT)
Dept: CARDIAC REHAB | Facility: CLINIC | Age: 65
End: 2020-08-21
Attending: SURGERY
Payer: COMMERCIAL

## 2020-08-21 DIAGNOSIS — Z79.4 TYPE 2 DIABETES MELLITUS WITH DIABETIC NEPHROPATHY, WITH LONG-TERM CURRENT USE OF INSULIN (H): Chronic | ICD-10-CM

## 2020-08-21 DIAGNOSIS — E11.21 TYPE 2 DIABETES MELLITUS WITH DIABETIC NEPHROPATHY, WITH LONG-TERM CURRENT USE OF INSULIN (H): Chronic | ICD-10-CM

## 2020-08-21 DIAGNOSIS — Z94.1 HEART TRANSPLANT, ORTHOTOPIC, STATUS (H): ICD-10-CM

## 2020-08-21 PROCEDURE — 93798 PHYS/QHP OP CAR RHAB W/ECG: CPT

## 2020-08-21 PROCEDURE — 40000116 ZZH STATISTIC OP CR VISIT

## 2020-08-21 RX ORDER — FUROSEMIDE 20 MG
20 TABLET ORAL DAILY
Qty: 90 TABLET | Refills: 3 | Status: ON HOLD | OUTPATIENT
Start: 2020-08-21 | End: 2020-08-28

## 2020-08-21 NOTE — TELEPHONE ENCOUNTER
Received call from pt's daughter regarding pt's cardiac rehab visit this AM.  Pt was noted to have elevated BG's- 300's at rehab today.  Reviewed notes from [facility] Cespedes to make sure pt is on correct dosing.  Also spoke with Marisabel to clarify instructions and follow up plan.  Per notes pt is to increase his morning NPH to 50 units if morning BG is over 200 consistently. Pt is also to increase evening NPH to 40 units if night time BG is over 200 consistently.    Reviewed this week's BG with pt- he has been over 200 in AM and PM.  Pt instructed to increase doses accordingly.    Pt given number to set up an education session with diabetic educator for a post transplant review.  Pt to follow up with Marisabel Prieto next week- her team will schedule.  Pt called using - pt and wife state understanding all instructions and plan of care.

## 2020-08-24 ENCOUNTER — TELEPHONE (OUTPATIENT)
Dept: TRANSPLANT | Facility: CLINIC | Age: 65
End: 2020-08-24

## 2020-08-24 ENCOUNTER — HOSPITAL ENCOUNTER (INPATIENT)
Facility: CLINIC | Age: 65
LOS: 4 days | Discharge: HOME | DRG: 638 | End: 2020-08-28
Attending: INTERNAL MEDICINE | Admitting: INTERNAL MEDICINE
Payer: COMMERCIAL

## 2020-08-24 ENCOUNTER — HOSPITAL ENCOUNTER (EMERGENCY)
Facility: CLINIC | Age: 65
Discharge: STILL ADMITTED OR ADMINISTRATIVE | DRG: 638 | End: 2020-08-24
Attending: INTERNAL MEDICINE | Admitting: INTERNAL MEDICINE
Payer: COMMERCIAL

## 2020-08-24 DIAGNOSIS — Z94.1 HEART REPLACED BY TRANSPLANT (H): Primary | ICD-10-CM

## 2020-08-24 DIAGNOSIS — Z79.4 TYPE 2 DIABETES MELLITUS WITH DIABETIC NEPHROPATHY, WITH LONG-TERM CURRENT USE OF INSULIN (H): Chronic | ICD-10-CM

## 2020-08-24 DIAGNOSIS — E11.21 TYPE 2 DIABETES MELLITUS WITH DIABETIC NEPHROPATHY, WITH LONG-TERM CURRENT USE OF INSULIN (H): Chronic | ICD-10-CM

## 2020-08-24 DIAGNOSIS — Z94.1 HEART TRANSPLANT, ORTHOTOPIC, STATUS (H): ICD-10-CM

## 2020-08-24 DIAGNOSIS — D75.829 HIT (HEPARIN-INDUCED THROMBOCYTOPENIA) (H): ICD-10-CM

## 2020-08-24 DIAGNOSIS — I89.0 LYMPHEDEMA: ICD-10-CM

## 2020-08-24 PROBLEM — R73.9 HYPERGLYCEMIA: Status: ACTIVE | Noted: 2020-08-24

## 2020-08-24 LAB
ALBUMIN SERPL-MCNC: 2.7 G/DL (ref 3.4–5)
ALP SERPL-CCNC: 124 U/L (ref 40–150)
ALT SERPL W P-5'-P-CCNC: 28 U/L (ref 0–70)
ANION GAP SERPL CALCULATED.3IONS-SCNC: 8 MMOL/L (ref 3–14)
AST SERPL W P-5'-P-CCNC: 12 U/L (ref 0–45)
BASOPHILS # BLD AUTO: 0 10E9/L (ref 0–0.2)
BASOPHILS NFR BLD AUTO: 0 %
BILIRUB SERPL-MCNC: 0.5 MG/DL (ref 0.2–1.3)
BUN SERPL-MCNC: 67 MG/DL (ref 7–30)
CALCIUM SERPL-MCNC: 8.2 MG/DL (ref 8.5–10.1)
CHLORIDE SERPL-SCNC: 101 MMOL/L (ref 94–109)
CO2 SERPL-SCNC: 23 MMOL/L (ref 20–32)
CREAT SERPL-MCNC: 1.97 MG/DL (ref 0.66–1.25)
DIFFERENTIAL METHOD BLD: ABNORMAL
EOSINOPHIL # BLD AUTO: 0 10E9/L (ref 0–0.7)
EOSINOPHIL NFR BLD AUTO: 0 %
ERYTHROCYTE [DISTWIDTH] IN BLOOD BY AUTOMATED COUNT: 21.2 % (ref 10–15)
FERRITIN SERPL-MCNC: 388 NG/ML (ref 26–388)
GFR SERPL CREATININE-BSD FRML MDRD: 34 ML/MIN/{1.73_M2}
GLUCOSE BLDC GLUCOMTR-MCNC: 258 MG/DL (ref 70–99)
GLUCOSE BLDC GLUCOMTR-MCNC: 305 MG/DL (ref 70–99)
GLUCOSE BLDC GLUCOMTR-MCNC: 336 MG/DL (ref 70–99)
GLUCOSE SERPL-MCNC: 269 MG/DL (ref 70–99)
HCT VFR BLD AUTO: 21.9 % (ref 40–53)
HGB BLD-MCNC: 6.8 G/DL (ref 13.3–17.7)
IMM GRANULOCYTES # BLD: 0.1 10E9/L (ref 0–0.4)
IMM GRANULOCYTES NFR BLD: 2.9 %
IRON SATN MFR SERPL: 35 % (ref 15–46)
IRON SERPL-MCNC: 88 UG/DL (ref 35–180)
KETONES BLD-SCNC: 0 MMOL/L (ref 0–0.6)
LYMPHOCYTES # BLD AUTO: 0.3 10E9/L (ref 0.8–5.3)
LYMPHOCYTES NFR BLD AUTO: 5.6 %
MAGNESIUM SERPL-MCNC: 1.9 MG/DL (ref 1.6–2.3)
MCH RBC QN AUTO: 31.3 PG (ref 26.5–33)
MCHC RBC AUTO-ENTMCNC: 31.1 G/DL (ref 31.5–36.5)
MCV RBC AUTO: 101 FL (ref 78–100)
MONOCYTES # BLD AUTO: 0.2 10E9/L (ref 0–1.3)
MONOCYTES NFR BLD AUTO: 4.3 %
NEUTROPHILS # BLD AUTO: 4.2 10E9/L (ref 1.6–8.3)
NEUTROPHILS NFR BLD AUTO: 87.2 %
NRBC # BLD AUTO: 0 10*3/UL
NRBC BLD AUTO-RTO: 0 /100
NT-PROBNP SERPL-MCNC: 8357 PG/ML (ref 0–900)
PLATELET # BLD AUTO: 193 10E9/L (ref 150–450)
POTASSIUM SERPL-SCNC: 5 MMOL/L (ref 3.4–5.3)
PROT SERPL-MCNC: 5.7 G/DL (ref 6.8–8.8)
RBC # BLD AUTO: 2.17 10E12/L (ref 4.4–5.9)
RETICS # AUTO: 85.2 10E9/L (ref 25–95)
RETICS/RBC NFR AUTO: 3.9 % (ref 0.5–2)
SODIUM SERPL-SCNC: 132 MMOL/L (ref 133–144)
TIBC SERPL-MCNC: 249 UG/DL (ref 240–430)
WBC # BLD AUTO: 4.9 10E9/L (ref 4–11)

## 2020-08-24 PROCEDURE — 99223 1ST HOSP IP/OBS HIGH 75: CPT | Mod: GC | Performed by: INTERNAL MEDICINE

## 2020-08-24 PROCEDURE — 40000556 ZZH STATISTIC PERIPHERAL IV START W US GUIDANCE

## 2020-08-24 PROCEDURE — 25000131 ZZH RX MED GY IP 250 OP 636 PS 637: Performed by: STUDENT IN AN ORGANIZED HEALTH CARE EDUCATION/TRAINING PROGRAM

## 2020-08-24 PROCEDURE — 93005 ELECTROCARDIOGRAM TRACING: CPT

## 2020-08-24 PROCEDURE — 80053 COMPREHEN METABOLIC PANEL: CPT | Performed by: STUDENT IN AN ORGANIZED HEALTH CARE EDUCATION/TRAINING PROGRAM

## 2020-08-24 PROCEDURE — 00000146 ZZHCL STATISTIC GLUCOSE BY METER IP

## 2020-08-24 PROCEDURE — 86923 COMPATIBILITY TEST ELECTRIC: CPT | Performed by: STUDENT IN AN ORGANIZED HEALTH CARE EDUCATION/TRAINING PROGRAM

## 2020-08-24 PROCEDURE — 36415 COLL VENOUS BLD VENIPUNCTURE: CPT | Performed by: STUDENT IN AN ORGANIZED HEALTH CARE EDUCATION/TRAINING PROGRAM

## 2020-08-24 PROCEDURE — 82728 ASSAY OF FERRITIN: CPT | Performed by: STUDENT IN AN ORGANIZED HEALTH CARE EDUCATION/TRAINING PROGRAM

## 2020-08-24 PROCEDURE — 93010 ELECTROCARDIOGRAM REPORT: CPT | Performed by: INTERNAL MEDICINE

## 2020-08-24 PROCEDURE — 83735 ASSAY OF MAGNESIUM: CPT | Performed by: STUDENT IN AN ORGANIZED HEALTH CARE EDUCATION/TRAINING PROGRAM

## 2020-08-24 PROCEDURE — 40000611 ZZHCL STATISTIC MORPHOLOGY W/INTERP HEMEPATH TC 85060: Performed by: STUDENT IN AN ORGANIZED HEALTH CARE EDUCATION/TRAINING PROGRAM

## 2020-08-24 PROCEDURE — 83550 IRON BINDING TEST: CPT | Performed by: STUDENT IN AN ORGANIZED HEALTH CARE EDUCATION/TRAINING PROGRAM

## 2020-08-24 PROCEDURE — U0003 INFECTIOUS AGENT DETECTION BY NUCLEIC ACID (DNA OR RNA); SEVERE ACUTE RESPIRATORY SYNDROME CORONAVIRUS 2 (SARS-COV-2) (CORONAVIRUS DISEASE [COVID-19]), AMPLIFIED PROBE TECHNIQUE, MAKING USE OF HIGH THROUGHPUT TECHNOLOGIES AS DESCRIBED BY CMS-2020-01-R: HCPCS | Performed by: STUDENT IN AN ORGANIZED HEALTH CARE EDUCATION/TRAINING PROGRAM

## 2020-08-24 PROCEDURE — 83880 ASSAY OF NATRIURETIC PEPTIDE: CPT | Performed by: STUDENT IN AN ORGANIZED HEALTH CARE EDUCATION/TRAINING PROGRAM

## 2020-08-24 PROCEDURE — 86850 RBC ANTIBODY SCREEN: CPT | Performed by: STUDENT IN AN ORGANIZED HEALTH CARE EDUCATION/TRAINING PROGRAM

## 2020-08-24 PROCEDURE — 12000012 ZZH R&B MS OVERFLOW UMMC

## 2020-08-24 PROCEDURE — 25000132 ZZH RX MED GY IP 250 OP 250 PS 637: Performed by: STUDENT IN AN ORGANIZED HEALTH CARE EDUCATION/TRAINING PROGRAM

## 2020-08-24 PROCEDURE — 86901 BLOOD TYPING SEROLOGIC RH(D): CPT | Performed by: STUDENT IN AN ORGANIZED HEALTH CARE EDUCATION/TRAINING PROGRAM

## 2020-08-24 PROCEDURE — 86900 BLOOD TYPING SEROLOGIC ABO: CPT | Performed by: STUDENT IN AN ORGANIZED HEALTH CARE EDUCATION/TRAINING PROGRAM

## 2020-08-24 PROCEDURE — 12000004 ZZH R&B IMCU UMMC

## 2020-08-24 PROCEDURE — 82010 KETONE BODYS QUAN: CPT | Performed by: STUDENT IN AN ORGANIZED HEALTH CARE EDUCATION/TRAINING PROGRAM

## 2020-08-24 PROCEDURE — 83540 ASSAY OF IRON: CPT | Performed by: STUDENT IN AN ORGANIZED HEALTH CARE EDUCATION/TRAINING PROGRAM

## 2020-08-24 PROCEDURE — 85025 COMPLETE CBC W/AUTO DIFF WBC: CPT | Performed by: STUDENT IN AN ORGANIZED HEALTH CARE EDUCATION/TRAINING PROGRAM

## 2020-08-24 PROCEDURE — 25000128 H RX IP 250 OP 636: Performed by: STUDENT IN AN ORGANIZED HEALTH CARE EDUCATION/TRAINING PROGRAM

## 2020-08-24 PROCEDURE — 85045 AUTOMATED RETICULOCYTE COUNT: CPT | Performed by: STUDENT IN AN ORGANIZED HEALTH CARE EDUCATION/TRAINING PROGRAM

## 2020-08-24 RX ORDER — POLYETHYLENE GLYCOL 3350 17 G/17G
17 POWDER, FOR SOLUTION ORAL DAILY
Status: DISCONTINUED | OUTPATIENT
Start: 2020-08-25 | End: 2020-08-28 | Stop reason: HOSPADM

## 2020-08-24 RX ORDER — PREDNISONE 10 MG/1
10 TABLET ORAL EVERY EVENING
Status: DISCONTINUED | OUTPATIENT
Start: 2020-08-24 | End: 2020-08-25

## 2020-08-24 RX ORDER — FONDAPARINUX SODIUM 7.5 MG/.6ML
7.5 INJECTION SUBCUTANEOUS EVERY 24 HOURS
Status: DISCONTINUED | OUTPATIENT
Start: 2020-08-25 | End: 2020-08-28 | Stop reason: HOSPADM

## 2020-08-24 RX ORDER — ROSUVASTATIN CALCIUM 10 MG/1
10 TABLET, COATED ORAL DAILY
Status: DISCONTINUED | OUTPATIENT
Start: 2020-08-25 | End: 2020-08-28 | Stop reason: HOSPADM

## 2020-08-24 RX ORDER — FUROSEMIDE 10 MG/ML
40 INJECTION INTRAMUSCULAR; INTRAVENOUS ONCE
Status: COMPLETED | OUTPATIENT
Start: 2020-08-24 | End: 2020-08-24

## 2020-08-24 RX ORDER — HYDRALAZINE HYDROCHLORIDE 25 MG/1
75 TABLET, FILM COATED ORAL EVERY 8 HOURS
Status: DISCONTINUED | OUTPATIENT
Start: 2020-08-24 | End: 2020-08-28 | Stop reason: HOSPADM

## 2020-08-24 RX ORDER — SULFAMETHOXAZOLE AND TRIMETHOPRIM 400; 80 MG/1; MG/1
1 TABLET ORAL DAILY
Status: DISCONTINUED | OUTPATIENT
Start: 2020-08-25 | End: 2020-08-28 | Stop reason: HOSPADM

## 2020-08-24 RX ORDER — AMLODIPINE BESYLATE 5 MG/1
5 TABLET ORAL DAILY
Status: DISCONTINUED | OUTPATIENT
Start: 2020-08-25 | End: 2020-08-25

## 2020-08-24 RX ORDER — VALGANCICLOVIR 450 MG/1
450 TABLET, FILM COATED ORAL DAILY
Status: DISCONTINUED | OUTPATIENT
Start: 2020-08-25 | End: 2020-08-25

## 2020-08-24 RX ORDER — METHOCARBAMOL 500 MG/1
500 TABLET, FILM COATED ORAL 4 TIMES DAILY PRN
Status: DISCONTINUED | OUTPATIENT
Start: 2020-08-24 | End: 2020-08-28 | Stop reason: HOSPADM

## 2020-08-24 RX ORDER — MYCOPHENOLATE MOFETIL 250 MG/1
1500 CAPSULE ORAL 2 TIMES DAILY
Status: DISCONTINUED | OUTPATIENT
Start: 2020-08-24 | End: 2020-08-28 | Stop reason: HOSPADM

## 2020-08-24 RX ORDER — ACETAMINOPHEN 325 MG/1
650 TABLET ORAL EVERY 6 HOURS PRN
Status: DISCONTINUED | OUTPATIENT
Start: 2020-08-24 | End: 2020-08-28 | Stop reason: HOSPADM

## 2020-08-24 RX ORDER — TACROLIMUS 5 MG/1
5 CAPSULE ORAL
Status: DISCONTINUED | OUTPATIENT
Start: 2020-08-24 | End: 2020-08-26

## 2020-08-24 RX ORDER — NICOTINE POLACRILEX 4 MG
15-30 LOZENGE BUCCAL
Status: DISCONTINUED | OUTPATIENT
Start: 2020-08-24 | End: 2020-08-28 | Stop reason: HOSPADM

## 2020-08-24 RX ORDER — PANTOPRAZOLE SODIUM 40 MG/1
40 TABLET, DELAYED RELEASE ORAL
Status: DISCONTINUED | OUTPATIENT
Start: 2020-08-25 | End: 2020-08-28 | Stop reason: HOSPADM

## 2020-08-24 RX ORDER — NYSTATIN 100000/ML
1000000 SUSPENSION, ORAL (FINAL DOSE FORM) ORAL 4 TIMES DAILY
Status: DISCONTINUED | OUTPATIENT
Start: 2020-08-24 | End: 2020-08-26

## 2020-08-24 RX ORDER — TAMSULOSIN HYDROCHLORIDE 0.4 MG/1
0.4 CAPSULE ORAL DAILY
Status: DISCONTINUED | OUTPATIENT
Start: 2020-08-25 | End: 2020-08-28 | Stop reason: HOSPADM

## 2020-08-24 RX ORDER — DEXTROSE MONOHYDRATE 25 G/50ML
25-50 INJECTION, SOLUTION INTRAVENOUS
Status: DISCONTINUED | OUTPATIENT
Start: 2020-08-24 | End: 2020-08-28 | Stop reason: HOSPADM

## 2020-08-24 RX ADMIN — TACROLIMUS 5 MG: 5 CAPSULE ORAL at 21:08

## 2020-08-24 RX ADMIN — FUROSEMIDE 40 MG: 10 INJECTION, SOLUTION INTRAVENOUS at 22:27

## 2020-08-24 RX ADMIN — MYCOPHENOLATE MOFETIL 1500 MG: 250 CAPSULE ORAL at 21:08

## 2020-08-24 RX ADMIN — INSULIN HUMAN 40 UNITS: 100 INJECTION, SUSPENSION SUBCUTANEOUS at 22:50

## 2020-08-24 RX ADMIN — PREDNISONE 10 MG: 10 TABLET ORAL at 21:09

## 2020-08-24 RX ADMIN — INSULIN ASPART 3 UNITS: 100 INJECTION, SOLUTION INTRAVENOUS; SUBCUTANEOUS at 22:11

## 2020-08-24 RX ADMIN — NYSTATIN 1000000 UNITS: 100000 SUSPENSION ORAL at 21:08

## 2020-08-24 RX ADMIN — HYDRALAZINE HYDROCHLORIDE 75 MG: 25 TABLET ORAL at 21:09

## 2020-08-24 ASSESSMENT — ACTIVITIES OF DAILY LIVING (ADL)
TOILETING: 0-->INDEPENDENT
RETIRED_COMMUNICATION: 0-->UNDERSTANDS/COMMUNICATES WITHOUT DIFFICULTY
RETIRED_EATING: 0-->INDEPENDENT
DRESS: 0-->INDEPENDENT
ADLS_ACUITY_SCORE: 12
TRANSFERRING: 0-->INDEPENDENT
FALL_HISTORY_WITHIN_LAST_SIX_MONTHS: NO
AMBULATION: 0-->INDEPENDENT
BATHING: 0-->INDEPENDENT
COGNITION: 0 - NO COGNITION ISSUES REPORTED
SWALLOWING: 0-->SWALLOWS FOODS/LIQUIDS WITHOUT DIFFICULTY

## 2020-08-24 ASSESSMENT — MIFFLIN-ST. JEOR: SCORE: 1503.85

## 2020-08-24 NOTE — PROGRESS NOTES
"Lucian Henderson Sr. is a 64 year old male who is being evaluated via a billable video visit.      The patient has been notified of following:     \"This video visit will be conducted via a call between you and your physician/provider. We have found that certain health care needs can be provided without the need for an in-person physical exam.  This service lets us provide the care you need with a video conversation.  If a prescription is necessary we can send it directly to your pharmacy.  If lab work is needed we can place an order for that and you can then stop by our lab to have the test done at a later time.    Video visits are billed at different rates depending on your insurance coverage.  Please reach out to your insurance provider with any questions.    If during the course of the call the physician/provider feels a video visit is not appropriate, you will not be charged for this service.\"    Patient has given verbal consent for Video visit? Yes  How would you like to obtain your AVS? MyChart    *Please text video visit link to: [phone]* (doximity)*    Will anyone else be joining your video visit? No      Video-Visit Details    Type of service:  Video Visit    Video Start Time: 3:39 PM  Video End Time: 3:49 PM    Originating Location (pt. Location): Other [facility] hospital - admitted again    Distant Location (provider location):  [facility] END[facility]     Liyah Snowden MA    Patients Glucose Data was obtained via telephone and located in my note.     Lucian Dalearza Sr. is a 64 year old male who is being evaluated via a billable telephone visit.        Liyah Snowden MA    Diabetes Virtual Consult Note  Marisabel Prieto PA-C  August 25, 2020    Lucian Henderson Sr. is a 64 year old male with a past medical history including  has a past medical history of CAD (coronary artery disease), CHF (congestive heart failure) (H), CKD (chronic kidney disease), stage III (H), Cortical cataract " of both eyes, Diabetes (H), Hyperlipidemia, Hypertension, Ischemic cardiomyopathy, Obesity, CHANTEL (obstructive sleep apnea), and Osteoarthritis. He also has no past medical history of Chronic infection, Complication of anesthesia, COPD (chronic obstructive pulmonary disease) (H), Heart murmur, Irregular heart beat, Liver disease, Other chronic pain, or Uncomplicated asthma.    He is here for f/u of diabetes.      He was admitted to [facility] 07/03 for heart failure exacerbation with cardiogenic shock, underwent right subclavian IABP insertion 07/16 with Dr. Sparrow, and then heart transplant 07/20 with Dr. Griselli.  He was hospitalized 31 days through 8/3/2020.      Patient was started on ASA, crestor. Hydralazine was started for hypertension, this was titrated to 75 mg TID at time of discharge. Patient was diuresed with IV lasix and will be discharged on 40 mg PO in the am, 20 mg PO in the afternoon. Chest tubes were removed when drainage decreased and follow-up CXR was negative for any significant pneumothorax. Terbutaline was weaned off prior to discharge and TPW were removed when appropriate. Immunosuppression per cardiology, was discharged on tacrolimus 4 mg PO BID and prednisone 20 mg BID.     Lucian tells me that he is again hospitalized due to high blood sugars.  He states that at the last check his blood sugar now is at goal though he does not know how or what medications are being used to achieve this.  He states that he does not yet know what medication he is supposed to go home on but he believes he is going home this afternoon or tomorrow morning.  He notes that they have not been giving his pills and he believes he is just taking insulin.  He does have his pen needles at home now.    We reviewed glucometer, pump and CGMS data together.  It revealed:  08/19/2020  283 (fasting)     08/20/2020  263 (fasting)  188     08/21/2020  232 (fasting)  253     08/22/2020  233 (fasting)     08/23/2020  233  (fasting)     08/24/2020  333 (fasting)  430      All fasting.        Janice  has a past medical history of CAD (coronary artery disease), CHF (congestive heart failure) (H), CKD (chronic kidney disease), stage III (H), Cortical cataract of both eyes, Diabetes (H), Hyperlipidemia, Hypertension, Ischemic cardiomyopathy, Obesity, CHANTEL (obstructive sleep apnea), and Osteoarthritis. He also has no past medical history of Chronic infection, Complication of anesthesia, COPD (chronic obstructive pulmonary disease) (H), Heart murmur, Irregular heart beat, Liver disease, Other chronic pain, or Uncomplicated asthma.  No current facility-administered medications for this visit.      No current outpatient medications on file.     Facility-Administered Medications Ordered in Other Visits   Medication     acetaminophen (TYLENOL) tablet 650 mg     calcium carbonate 600 mg-vitamin D 400 units (CALTRATE) per tablet 1 tablet     Continuing ACE inhibitor/ARB/ARNI from home medication list OR ACE inhibitor/ARB/ARNI order already placed during this visit     Continuing beta blocker from home medication list OR beta blocker order already placed during this visit     glucose gel 15-30 g    Or     dextrose 50 % injection 25-50 mL    Or     glucagon injection 1 mg     fondaparinux ANTICOAGULANT (ARIXTRA) injection 7.5 mg     HOLD nitroGLYcerin IF     hydrALAZINE (APRESOLINE) tablet 75 mg     insulin aspart (NovoLOG) injection (RAPID ACTING)     insulin aspart (NovoLOG) injection (RAPID ACTING)     insulin aspart (NovoLOG) injection (RAPID ACTING)     insulin isophane human (HumuLIN N PEN) injection 40 Units     insulin isophane human (HumuLIN N PEN) injection 50 Units     methocarbamol (ROBAXIN) tablet 500 mg     mycophenolate (GENERIC EQUIVALENT) capsule 1,500 mg     nystatin (MYCOSTATIN) suspension 1,000,000 Units     pantoprazole (PROTONIX) EC tablet 40 mg     polyethylene glycol (MIRALAX) Packet 17 g     predniSONE (DELTASONE) tablet  "10 mg     predniSONE (DELTASONE) tablet 15 mg     rosuvastatin (CRESTOR) tablet 10 mg     sulfamethoxazole-trimethoprim (BACTRIM) 400-80 MG per tablet 1 tablet     tacrolimus (GENERIC EQUIVALENT) capsule 5 mg     tacrolimus (GENERIC EQUIVALENT) capsule 6 mg     tamsulosin (FLOMAX) capsule 0.4 mg     [START ON 8/27/2020] valGANciclovir (VALCYTE) tablet 450 mg              Lucian's family history includes Diabetes in his brother, sister, and sister.    ROS:   Patient denies any fevers, chills or sweats as well as any changes in or problems with vision new or different headaches.  Patient denies symptoms of hypoglycemia except as above.  He is having fatigue and polyuria polydipsia and his blood sugars are above 400.  Patients denies marked fatigue, cough, shortness of breath, chest pain or pressure.  He does have chest pain from recent surgery this is improving.  There has been no pain with or other changes in urination or  itching or pain in genital areas.  Patient denies any noted swelling in feet, ankles or otherwise, loss of sensation or pain  in feet or other areas.    Patient also denies current difficulties with depressed mood, anhedonia or worrying too much.        Exam:    VIDEO VISIT    Lucian is alerted and oriented.  He is present with his wife  Mood is \"good,\" affect is congruent.  Thoughtful form and content are fluid and coherent.  No signs of distress are appreciated.    It appears that he has at least one NovoLog FlexPen at home however there is an orange apparatus on the end of it that he does not seem to be able to remove.  He and his wife tried to get the pen needles that they have onto both the NovoLog and the Humalog pens and were unable to they do fit the NPH pens.    Lucian does appear a bit pale.      Data:      Most recent:  Lab Results   Component Value Date    CR 1.67 08/17/2020     Lab Results   Component Value Date     08/17/2020       GFR Estimate   Date Value Ref Range Status "   08/25/2020 34 (L) >60 mL/min/[1.73_m2] Final     Comment:     Non  GFR Calc  Starting 12/18/2018, serum creatinine based estimated GFR (eGFR) will be   calculated using the Chronic Kidney Disease Epidemiology Collaboration   (CKD-EPI) equation.     08/24/2020 34 (L) >60 mL/min/[1.73_m2] Final     Comment:     Non  GFR Calc  Starting 12/18/2018, serum creatinine based estimated GFR (eGFR) will be   calculated using the Chronic Kidney Disease Epidemiology Collaboration   (CKD-EPI) equation.     08/17/2020 42 (L) >60 mL/min/[1.73_m2] Final     Comment:     Non  GFR Calc  Starting 12/18/2018, serum creatinine based estimated GFR (eGFR) will be   calculated using the Chronic Kidney Disease Epidemiology Collaboration   (CKD-EPI) equation.       GFR Estimate If Black   Date Value Ref Range Status   08/25/2020 40 (L) >60 mL/min/[1.73_m2] Final     Comment:      GFR Calc  Starting 12/18/2018, serum creatinine based estimated GFR (eGFR) will be   calculated using the Chronic Kidney Disease Epidemiology Collaboration   (CKD-EPI) equation.     08/24/2020 40 (L) >60 mL/min/[1.73_m2] Final     Comment:      GFR Calc  Starting 12/18/2018, serum creatinine based estimated GFR (eGFR) will be   calculated using the Chronic Kidney Disease Epidemiology Collaboration   (CKD-EPI) equation.     08/17/2020 48 (L) >60 mL/min/[1.73_m2] Final     Comment:      GFR Calc  Starting 12/18/2018, serum creatinine based estimated GFR (eGFR) will be   calculated using the Chronic Kidney Disease Epidemiology Collaboration   (CKD-EPI) equation.           Lab Results   Component Value Date    A1C 8.2 (H) 07/03/2020    A1C 7.9 (H) 07/08/2019    A1C 8.5 (H) 03/11/2019    A1C 7.6 (H) 10/16/2018    A1C 9.8 (H) 09/06/2017    HEMOGLOBINA1 8.3 (A) 08/06/2018    HEMOGLOBINA1 10.6 (A) 04/30/2018     Lab Results   Component Value Date    MICROL 6 10/03/2018     No  results found for: MICROALBUMIN  No results found for: CPEPT, GADAB, ISCAB  Cholesterol   Date Value Ref Range Status   08/17/2020 118 <200 mg/dL Final   07/08/2019 104 <200 mg/dL Final     HDL Cholesterol   Date Value Ref Range Status   08/17/2020 75 >39 mg/dL Final   07/08/2019 27 (L) >39 mg/dL Final     LDL Cholesterol Calculated   Date Value Ref Range Status   08/17/2020 26 <100 mg/dL Final     Comment:     Desirable:       <100 mg/dl   07/08/2019 41 <100 mg/dL Final     Comment:     Desirable:       <100 mg/dl     Triglycerides   Date Value Ref Range Status   08/17/2020 82 <150 mg/dL Final   07/08/2019 181 (H) <150 mg/dL Final     Comment:     Borderline high:  150-199 mg/dl  High:             200-499 mg/dl  Very high:       >499 mg/dl       Cholesterol/HDL Ratio   Date Value Ref Range Status   06/27/2015 2.6 0.0 - 5.0 Final   01/11/2015 6.0 (H) 0.0 - 5.0 Final                   Assessment/Plan:    Lucian is a 64 year old male with  has a past medical history of CAD (coronary artery disease), CHF (congestive heart failure) (H), CKD (chronic kidney disease), stage III (H), Cortical cataract of both eyes, Diabetes (H), Hyperlipidemia, Hypertension, Ischemic cardiomyopathy, Obesity, CHANTEL (obstructive sleep apnea), and Osteoarthritis. He also has no past medical history of Chronic infection, Complication of anesthesia, COPD (chronic obstructive pulmonary disease) (H), Heart murmur, Irregular heart beat, Liver disease, Other chronic pain, or Uncomplicated asthma.        Jose is a 64-year-old man who is one-month status post heart transplant with uncontrolled type 2 diabetes exacerbated by steroids now hospitalized again with BG above goal, acute kidney injury with Januvia and Amaryl now being held.  Acute kidney injury also possibly due to supratherapeutic tacrolimus level.        >50% of 10 minute visit spent in counseling, education and coordination of care coaching patient to be sure he understands plan and has  our number to call if BG above goal again when goes home.      It is my privilege to be involved in the care of the above patient.     Marisabel Prieto PA-C, MPAS  [facility]  Diabetes, Endocrinology, and Metabolism  [phone] Appointments/Nurse  [phone] pager  [phone] nurse line    This note was completed in part using Dragon voice recognition, and may contain word and grammatical errors.                  This encounter was opened in error. Please disregard.

## 2020-08-24 NOTE — TELEPHONE ENCOUNTER
Called pt to check in regarding blood sugars after increasing insulin dose last week.  Pt states his BG was 430 this AM.  Writer had pt check again while on the phone, and he states it was 468.  Pt states he is taking his insulin as prescribed.  Dr. Sheridan updated regarding pt's hyperglycemia.  He would like pt admitted to the [facility] for blood sugar management.  Pt informed, he will go to ER.  Report called to AURORA Warren Charge nurse.

## 2020-08-24 NOTE — PROGRESS NOTES
08/19/2020  283 (fasting)    08/20/2020  263 (fasting)  188    08/21/2020  232 (fasting)  253    08/22/2020  233 (fasting)    08/23/2020  233 (fasting)    08/24/2020  333 (fasting)  430

## 2020-08-24 NOTE — ED TRIAGE NOTES
"Triage assessment & note:    /54   Pulse 104   Temp 98.4  F (36.9  C) (Oral)   Resp 16   Ht 1.651 m (5' 5\")   Wt 79.7 kg (175 lb 11.2 oz)   SpO2 99%   BMI 29.24 kg/m      Pt presents with: hyperglycemia--blood sugars 430-500 today. Pt has no other symptoms, current .    Home treatments/remedies: pt took all insulin as ordered    Febrile/afebrile: n/a    Duration of complaint: all week  "

## 2020-08-24 NOTE — TELEPHONE ENCOUNTER
Called and spoke with pt's daughter regarding concerns for pt's weight loss.  Writer reviewed Lasix dose with Dr. Sheridan- dose decreased to 20mg daily.  Elisabeth states understanding.  She plans to help Lucian set up his meds this weekend, and will adjust the dose.

## 2020-08-25 ENCOUNTER — VIRTUAL VISIT (OUTPATIENT)
Dept: ENDOCRINOLOGY | Facility: CLINIC | Age: 65
End: 2020-08-25
Payer: COMMERCIAL

## 2020-08-25 ENCOUNTER — ALLIED HEALTH/NURSE VISIT (OUTPATIENT)
Dept: INTERPRETER SERVICES | Facility: CLINIC | Age: 65
End: 2020-08-25

## 2020-08-25 VITALS
HEIGHT: 65 IN | DIASTOLIC BLOOD PRESSURE: 63 MMHG | SYSTOLIC BLOOD PRESSURE: 111 MMHG | BODY MASS INDEX: 29.27 KG/M2 | OXYGEN SATURATION: 98 % | HEART RATE: 90 BPM | TEMPERATURE: 98 F | RESPIRATION RATE: 16 BRPM | WEIGHT: 175.7 LBS

## 2020-08-25 DIAGNOSIS — Z79.4 TYPE 2 DIABETES MELLITUS WITH HYPERGLYCEMIA, WITH LONG-TERM CURRENT USE OF INSULIN (H): Primary | ICD-10-CM

## 2020-08-25 DIAGNOSIS — E11.65 TYPE 2 DIABETES MELLITUS WITH HYPERGLYCEMIA, WITH LONG-TERM CURRENT USE OF INSULIN (H): Primary | ICD-10-CM

## 2020-08-25 DIAGNOSIS — Z53.9 ERRONEOUS ENCOUNTER--DISREGARD: ICD-10-CM

## 2020-08-25 LAB
ABO + RH BLD: NORMAL
ABO + RH BLD: NORMAL
ALBUMIN UR-MCNC: NEGATIVE MG/DL
ANION GAP SERPL CALCULATED.3IONS-SCNC: 7 MMOL/L (ref 3–14)
APPEARANCE UR: CLEAR
BILIRUB UR QL STRIP: NEGATIVE
BLD GP AB SCN SERPL QL: NORMAL
BLD PROD TYP BPU: NORMAL
BLD PROD TYP BPU: NORMAL
BLD UNIT ID BPU: 0
BLOOD BANK CMNT PATIENT-IMP: NORMAL
BLOOD PRODUCT CODE: NORMAL
BPU ID: NORMAL
BUN SERPL-MCNC: 64 MG/DL (ref 7–30)
CALCIUM SERPL-MCNC: 8.1 MG/DL (ref 8.5–10.1)
CHLORIDE SERPL-SCNC: 102 MMOL/L (ref 94–109)
CO2 SERPL-SCNC: 24 MMOL/L (ref 20–32)
COLOR UR AUTO: ABNORMAL
COPATH REPORT: NORMAL
CREAT SERPL-MCNC: 1.97 MG/DL (ref 0.66–1.25)
CRP SERPL-MCNC: <2.9 MG/L (ref 0–8)
ERYTHROCYTE [DISTWIDTH] IN BLOOD BY AUTOMATED COUNT: 21.1 % (ref 10–15)
ERYTHROCYTE [DISTWIDTH] IN BLOOD BY AUTOMATED COUNT: 21.2 % (ref 10–15)
FOLATE SERPL-MCNC: 8.1 NG/ML
GFR SERPL CREATININE-BSD FRML MDRD: 34 ML/MIN/{1.73_M2}
GLUCOSE BLDC GLUCOMTR-MCNC: 118 MG/DL (ref 70–99)
GLUCOSE BLDC GLUCOMTR-MCNC: 134 MG/DL (ref 70–99)
GLUCOSE BLDC GLUCOMTR-MCNC: 138 MG/DL (ref 70–99)
GLUCOSE BLDC GLUCOMTR-MCNC: 153 MG/DL (ref 70–99)
GLUCOSE BLDC GLUCOMTR-MCNC: 173 MG/DL (ref 70–99)
GLUCOSE BLDC GLUCOMTR-MCNC: 211 MG/DL (ref 70–99)
GLUCOSE BLDC GLUCOMTR-MCNC: 342 MG/DL (ref 70–99)
GLUCOSE SERPL-MCNC: 231 MG/DL (ref 70–99)
GLUCOSE UR STRIP-MCNC: 300 MG/DL
HAPTOGLOB SERPL-MCNC: 86 MG/DL (ref 32–197)
HCT VFR BLD AUTO: 21.3 % (ref 40–53)
HCT VFR BLD AUTO: 28.6 % (ref 40–53)
HGB BLD-MCNC: 6.7 G/DL (ref 13.3–17.7)
HGB BLD-MCNC: 8.5 G/DL (ref 13.3–17.7)
HGB UR QL STRIP: NEGATIVE
INR PPP: 1.11 (ref 0.86–1.14)
INTERPRETATION ECG - MUSE: NORMAL
KETONES UR STRIP-MCNC: NEGATIVE MG/DL
LDH SERPL L TO P-CCNC: 433 U/L (ref 85–227)
LEUKOCYTE ESTERASE UR QL STRIP: NEGATIVE
MAGNESIUM SERPL-MCNC: 1.8 MG/DL (ref 1.6–2.3)
MCH RBC QN AUTO: 31 PG (ref 26.5–33)
MCH RBC QN AUTO: 31.9 PG (ref 26.5–33)
MCHC RBC AUTO-ENTMCNC: 29.7 G/DL (ref 31.5–36.5)
MCHC RBC AUTO-ENTMCNC: 31.5 G/DL (ref 31.5–36.5)
MCV RBC AUTO: 101 FL (ref 78–100)
MCV RBC AUTO: 104 FL (ref 78–100)
MUCOUS THREADS #/AREA URNS LPF: PRESENT /LPF
NITRATE UR QL: NEGATIVE
NUM BPU REQUESTED: 1
PH UR STRIP: 5 PH (ref 5–7)
PLATELET # BLD AUTO: 180 10E9/L (ref 150–450)
PLATELET # BLD AUTO: 181 10E9/L (ref 150–450)
POTASSIUM SERPL-SCNC: 4.9 MMOL/L (ref 3.4–5.3)
RBC # BLD AUTO: 2.1 10E12/L (ref 4.4–5.9)
RBC # BLD AUTO: 2.74 10E12/L (ref 4.4–5.9)
RBC #/AREA URNS AUTO: <1 /HPF (ref 0–2)
SODIUM SERPL-SCNC: 133 MMOL/L (ref 133–144)
SOURCE: ABNORMAL
SP GR UR STRIP: 1.01 (ref 1–1.03)
SPECIMEN EXP DATE BLD: NORMAL
TACROLIMUS BLD-MCNC: 6.5 UG/L (ref 5–15)
TME LAST DOSE: NORMAL H
TRANSFUSION STATUS PATIENT QL: NORMAL
TRANSFUSION STATUS PATIENT QL: NORMAL
UROBILINOGEN UR STRIP-MCNC: NORMAL MG/DL (ref 0–2)
VIT B12 SERPL-MCNC: 328 PG/ML (ref 193–986)
WBC # BLD AUTO: 4.3 10E9/L (ref 4–11)
WBC # BLD AUTO: 4.5 10E9/L (ref 4–11)
WBC #/AREA URNS AUTO: 0 /HPF (ref 0–5)

## 2020-08-25 PROCEDURE — 85027 COMPLETE CBC AUTOMATED: CPT | Performed by: STUDENT IN AN ORGANIZED HEALTH CARE EDUCATION/TRAINING PROGRAM

## 2020-08-25 PROCEDURE — 12000012 ZZH R&B MS OVERFLOW UMMC

## 2020-08-25 PROCEDURE — 81001 URINALYSIS AUTO W/SCOPE: CPT | Performed by: STUDENT IN AN ORGANIZED HEALTH CARE EDUCATION/TRAINING PROGRAM

## 2020-08-25 PROCEDURE — 82607 VITAMIN B-12: CPT | Performed by: STUDENT IN AN ORGANIZED HEALTH CARE EDUCATION/TRAINING PROGRAM

## 2020-08-25 PROCEDURE — 80048 BASIC METABOLIC PNL TOTAL CA: CPT | Performed by: STUDENT IN AN ORGANIZED HEALTH CARE EDUCATION/TRAINING PROGRAM

## 2020-08-25 PROCEDURE — 25000128 H RX IP 250 OP 636: Performed by: STUDENT IN AN ORGANIZED HEALTH CARE EDUCATION/TRAINING PROGRAM

## 2020-08-25 PROCEDURE — 25000131 ZZH RX MED GY IP 250 OP 636 PS 637: Performed by: CLINICAL NURSE SPECIALIST

## 2020-08-25 PROCEDURE — 25000131 ZZH RX MED GY IP 250 OP 636 PS 637: Performed by: STUDENT IN AN ORGANIZED HEALTH CARE EDUCATION/TRAINING PROGRAM

## 2020-08-25 PROCEDURE — 25000132 ZZH RX MED GY IP 250 OP 250 PS 637: Performed by: STUDENT IN AN ORGANIZED HEALTH CARE EDUCATION/TRAINING PROGRAM

## 2020-08-25 PROCEDURE — 25000128 H RX IP 250 OP 636: Performed by: NURSE PRACTITIONER

## 2020-08-25 PROCEDURE — 86140 C-REACTIVE PROTEIN: CPT | Performed by: STUDENT IN AN ORGANIZED HEALTH CARE EDUCATION/TRAINING PROGRAM

## 2020-08-25 PROCEDURE — 99232 SBSQ HOSP IP/OBS MODERATE 35: CPT | Performed by: NURSE PRACTITIONER

## 2020-08-25 PROCEDURE — 82746 ASSAY OF FOLIC ACID SERUM: CPT | Performed by: STUDENT IN AN ORGANIZED HEALTH CARE EDUCATION/TRAINING PROGRAM

## 2020-08-25 PROCEDURE — 36415 COLL VENOUS BLD VENIPUNCTURE: CPT | Performed by: STUDENT IN AN ORGANIZED HEALTH CARE EDUCATION/TRAINING PROGRAM

## 2020-08-25 PROCEDURE — 83735 ASSAY OF MAGNESIUM: CPT | Performed by: STUDENT IN AN ORGANIZED HEALTH CARE EDUCATION/TRAINING PROGRAM

## 2020-08-25 PROCEDURE — 36415 COLL VENOUS BLD VENIPUNCTURE: CPT | Performed by: NURSE PRACTITIONER

## 2020-08-25 PROCEDURE — 83615 LACTATE (LD) (LDH) ENZYME: CPT | Performed by: NURSE PRACTITIONER

## 2020-08-25 PROCEDURE — 83010 ASSAY OF HAPTOGLOBIN QUANT: CPT | Performed by: STUDENT IN AN ORGANIZED HEALTH CARE EDUCATION/TRAINING PROGRAM

## 2020-08-25 PROCEDURE — 85027 COMPLETE CBC AUTOMATED: CPT | Performed by: NURSE PRACTITIONER

## 2020-08-25 PROCEDURE — P9016 RBC LEUKOCYTES REDUCED: HCPCS | Performed by: STUDENT IN AN ORGANIZED HEALTH CARE EDUCATION/TRAINING PROGRAM

## 2020-08-25 PROCEDURE — 85610 PROTHROMBIN TIME: CPT | Performed by: STUDENT IN AN ORGANIZED HEALTH CARE EDUCATION/TRAINING PROGRAM

## 2020-08-25 PROCEDURE — 80197 ASSAY OF TACROLIMUS: CPT | Performed by: STUDENT IN AN ORGANIZED HEALTH CARE EDUCATION/TRAINING PROGRAM

## 2020-08-25 RX ORDER — FUROSEMIDE 10 MG/ML
40 INJECTION INTRAMUSCULAR; INTRAVENOUS ONCE
Status: COMPLETED | OUTPATIENT
Start: 2020-08-25 | End: 2020-08-25

## 2020-08-25 RX ORDER — VALGANCICLOVIR 450 MG/1
450 TABLET, FILM COATED ORAL EVERY OTHER DAY
Status: DISCONTINUED | OUTPATIENT
Start: 2020-08-27 | End: 2020-08-28 | Stop reason: HOSPADM

## 2020-08-25 RX ORDER — PREDNISONE 10 MG/1
10 TABLET ORAL EVERY EVENING
Status: DISCONTINUED | OUTPATIENT
Start: 2020-08-25 | End: 2020-08-28 | Stop reason: HOSPADM

## 2020-08-25 RX ADMIN — TACROLIMUS 5 MG: 5 CAPSULE ORAL at 17:57

## 2020-08-25 RX ADMIN — FONDAPARINUX SODIUM 7.5 MG: 7.5 INJECTION, SOLUTION SUBCUTANEOUS at 07:55

## 2020-08-25 RX ADMIN — PANTOPRAZOLE SODIUM 40 MG: 40 TABLET, DELAYED RELEASE ORAL at 07:53

## 2020-08-25 RX ADMIN — INSULIN ASPART 2 UNITS: 100 INJECTION, SOLUTION INTRAVENOUS; SUBCUTANEOUS at 06:07

## 2020-08-25 RX ADMIN — MYCOPHENOLATE MOFETIL 1500 MG: 250 CAPSULE ORAL at 19:35

## 2020-08-25 RX ADMIN — NYSTATIN 1000000 UNITS: 100000 SUSPENSION ORAL at 07:55

## 2020-08-25 RX ADMIN — INSULIN ASPART 14 UNITS: 100 INJECTION, SOLUTION INTRAVENOUS; SUBCUTANEOUS at 13:33

## 2020-08-25 RX ADMIN — NYSTATIN 1000000 UNITS: 100000 SUSPENSION ORAL at 13:34

## 2020-08-25 RX ADMIN — INSULIN ASPART 5 UNITS: 100 INJECTION, SOLUTION INTRAVENOUS; SUBCUTANEOUS at 02:11

## 2020-08-25 RX ADMIN — ROSUVASTATIN CALCIUM 10 MG: 10 TABLET, FILM COATED ORAL at 07:53

## 2020-08-25 RX ADMIN — PREDNISONE 10 MG: 10 TABLET ORAL at 17:57

## 2020-08-25 RX ADMIN — HYDRALAZINE HYDROCHLORIDE 75 MG: 25 TABLET ORAL at 21:14

## 2020-08-25 RX ADMIN — MYCOPHENOLATE MOFETIL 1500 MG: 250 CAPSULE ORAL at 07:55

## 2020-08-25 RX ADMIN — INSULIN HUMAN 50 UNITS: 100 INJECTION, SUSPENSION SUBCUTANEOUS at 07:56

## 2020-08-25 RX ADMIN — INSULIN ASPART 6 UNITS: 100 INJECTION, SOLUTION INTRAVENOUS; SUBCUTANEOUS at 09:06

## 2020-08-25 RX ADMIN — VALGANCICLOVIR 450 MG: 450 TABLET, FILM COATED ORAL at 07:59

## 2020-08-25 RX ADMIN — AMLODIPINE BESYLATE 5 MG: 5 TABLET ORAL at 07:53

## 2020-08-25 RX ADMIN — HYDRALAZINE HYDROCHLORIDE 75 MG: 25 TABLET ORAL at 13:34

## 2020-08-25 RX ADMIN — Medication 1 TABLET: at 17:56

## 2020-08-25 RX ADMIN — Medication 1 TABLET: at 07:55

## 2020-08-25 RX ADMIN — TACROLIMUS 6 MG: 5 CAPSULE ORAL at 07:55

## 2020-08-25 RX ADMIN — NYSTATIN 1000000 UNITS: 100000 SUSPENSION ORAL at 17:57

## 2020-08-25 RX ADMIN — TAMSULOSIN HYDROCHLORIDE 0.4 MG: 0.4 CAPSULE ORAL at 07:55

## 2020-08-25 RX ADMIN — HYDRALAZINE HYDROCHLORIDE 75 MG: 25 TABLET ORAL at 04:22

## 2020-08-25 RX ADMIN — FUROSEMIDE 40 MG: 10 INJECTION, SOLUTION INTRAVENOUS at 15:03

## 2020-08-25 RX ADMIN — INSULIN ASPART 9 UNITS: 100 INJECTION, SOLUTION INTRAVENOUS; SUBCUTANEOUS at 17:58

## 2020-08-25 RX ADMIN — SULFAMETHOXAZOLE AND TRIMETHOPRIM 1 TABLET: 400; 80 TABLET ORAL at 07:54

## 2020-08-25 RX ADMIN — PREDNISONE 15 MG: 5 TABLET ORAL at 07:54

## 2020-08-25 ASSESSMENT — ACTIVITIES OF DAILY LIVING (ADL)
ADLS_ACUITY_SCORE: 12

## 2020-08-25 NOTE — LETTER
Date:August 26, 2020      Provider requested that no letter be sent. Do not send.       [facility] Information

## 2020-08-25 NOTE — H&P
Cardiology History and Physical    Patient Name: Lucian Henderson Sr. 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     Patient discharged 8/3/20 after month long admission for end stage heart failure and eventual heart transplant. He received heart transplant 7/19. His immediate post transplant course was complicated by primary graft dysfunction with EF of 20-25% on POD #1 and severe RV dysfunction likely due to prolonged ischemic time. His repeat echo 7/27 showed normal LVEF. During admission he also developed HIT and R internal jugular clot and has been on fondaparinux for anticoagulation.     He notes since discharge his blood sugars have been difficult to control. They were in the 300s but for the past few days have been in the 500s. With this he has noted feeling fatigued, dizzy, and has had increased urination. He is careful to avoid sugar and carbs and has not changed his diet recently. He had steroid induced hyperglycemia while admitted last time and endocrine was following for this. He followed up with endocrine via virtual visit 8/18 and his NPH was increased to 50 units in AM and 40 units in evening. His prednisone is slowly being tapered.     Today RN called to follow up on blood sugars and he reported AM glucose of 430 and direct admission arranged for diabetes management. On arrival here, his blood sugars are in 330s. Patient reports feeling well. Denies fever, chills, shortness of breath, cough, or chest pain. He notes his lower extremity swelling is at baseline since discharge.          Past Medical History:      Past Medical History:   Diagnosis Date     CAD (coronary artery disease)      CHF (congestive heart failure) (H)      CKD (chronic kidney disease), stage III (H)      Cortical cataract of both eyes      Diabetes (H)      Hyperlipidemia      Hypertension      Ischemic cardiomyopathy      Obesity      CHANTEL (obstructive sleep apnea)     occas cpap     Osteoarthritis             Past Surgical History:   - heart transplant 7/19/2020  - 3v CABG 2/2008           Social History:     Social History     Socioeconomic History     Marital status:      Spouse name: Not on file     Number of children: 4     Years of education: Not on file     Highest education level: Not on file   Occupational History     Occupation:      Employer: [employer]     Employer: RETIRED   Social Needs     Financial resource strain: Not on file     Food insecurity     Worry: Not on file     Inability: Not on file     Transportation needs     Medical: Not on file     Non-medical: Not on file   Tobacco Use     Smoking status: Never Smoker     Smokeless tobacco: Never Used     Tobacco comment: Never smoked; non-smoking household   Substance and Sexual Activity     Alcohol use: No     Alcohol/week: 0.0 standard drinks     Drug use: No     Sexual activity: Yes     Partners: Female   Lifestyle     Physical activity     Days per week: Not on file     Minutes per session: Not on file     Stress: Not on file   Relationships     Social connections     Talks on phone: Not on file     Gets together: Not on file     Attends Nondenominational service: Not on file     Active member of club or organization: Not on file     Attends meetings of clubs or organizations: Not on file     Relationship status: Not on file     Intimate partner violence     Fear of current or ex partner: Not on file     Emotionally abused: Not on file     Physically abused: Not on file     Forced sexual activity: Not on file   Other Topics Concern     Parent/sibling w/ CABG,  MI or angioplasty before 65F 55M? No   Social History Narrative     Not on file            Family History:     Family History   Problem Relation Age of Onset     Diabetes Brother      Diabetes Sister      Diabetes Sister      Macular Degeneration No family hx of      Glaucoma No family hx of      Myocardial Infarction No family hx of      Kidney Disease No family hx of             Allergies:      Allergies   Allergen Reactions     Heparin Heparin Induced Thrombocytopenia     HIT screen sent 7/18/2020. CORRINE confirmed positive            Medications:     Prior to Admission Medications   Prescriptions Last Dose Informant Patient Reported? Taking?   Alcohol Swabs PADS   No No   Sig: Use to swab the area of the injection or sergio as directed   Per insurance coverage   Continuous Blood Gluc Sensor (Sirific WirelessSTYLE JEREMY 14 DAY SENSOR) [facility]   No No   Sig: Change every 14 days.   acetaminophen (TYLENOL) 325 MG tablet   No No   Sig: Take 3 tablets (975 mg) by mouth every 8 hours as needed for mild pain   amLODIPine (NORVASC) 5 MG tablet   No No   Sig: Take 1 tablet (5 mg) by mouth daily   blood glucose (ACCU-CHEK SMARTVIEW) test strip  Self No No   Sig: USE TO TEST THREE TIMES DAILY AS DIRECTED   blood glucose (NO BRAND SPECIFIED) test strip  Self No No   Sig: Use to test blood sugar 2 times daily or as directed.   blood glucose monitoring (ACCU-CHEK FELIPE SMARTVIEW) meter device kit  Self No No   Sig: Use to test blood sugar 3-4 times daily, as directed.   blood glucose monitoring (ONE TOUCH DELICA) lancets  Self No No   Sig: Use to test blood sugars 2 times daily.   calcium carbonate 600 mg-vitamin D 400 units (CALTRATE) 600-400 MG-UNIT per tablet   No No   Sig: Take 1 tablet by mouth 2 times daily (with meals)   fondaparinux ANTICOAGULANT (ARIXTRA) 7.5MG/0.6ML injection   No No   Sig: Inject 0.6 mLs (7.5 mg) Subcutaneous every 24 hours Take on Tuesday 8/4 at 10 am, then start taking fondaparinux shot daily at 8 am after that  (starting ). Hold dose on morning of your right heart cath and biopsies   furosemide (LASIX) 20 MG tablet   No No   Sig: Take 1 tablet (20 mg) by mouth daily   glimepiride (AMARYL) 2 MG tablet   No No   Sig: Take 3 tablets (6 mg) by mouth daily (with breakfast)   hydrALAZINE (APRESOLINE) 25 MG tablet   No No   Sig: Take 3 tablets (75 mg) by mouth every 8 hours   insulin aspart (NOVOLOG PEN) 100 UNIT/ML pen   No No   Sig: Inject 2-16 Units Subcutaneous 3 times daily (with meals) Correction Scale - custom DOSING     Do Not give Correction Insulin if Pre-Meal BG less than 140     -169 give 2 units.  -199 give 4 units.  -229 give 6 units.  -259 give 8 units.  -289 give 10 units.  -319 give 12 units.  -349 give 14 units.  BG >/= 350 give 16 units.        To be given with prandial insulin, and based on pre-meal blood glucose.   Notify provider if glucose greater than or equal 350 mg/dL after administration. If given at mealtime, administer within 30 minutes of start of meal   insulin isophane human (HUMULIN N PEN) 100 UNIT/ML injection   No No   Sig: Inject 50 Units Subcutaneous every morning   insulin isophane human (HUMULIN N PEN) 100 UNIT/ML injection   No No   Sig: Inject 40 Units Subcutaneous every evening   insulin pen needle (32G X 4 MM) 32G X 4 MM miscellaneous   No No   Sig: Use as directed by provider  Per insurance coverage   insulin pen needle (B-D U/F) 31G X 5 MM miscellaneous   No No   Si Units by Device route 2 times daily Use 2 pen needles daily or as directed.   insulin pen needle (NOVOFINE) 30G X 8 MM miscellaneous   No No   Sig: Inject 1 Box Subcutaneous 6 times daily Use 6 pen needles daily or as directed.   methocarbamol (ROBAXIN) 500 MG tablet   No No   Si tablet (500 mg) by Oral or Feeding Tube route 4 times daily as needed for muscle spasms   mycophenolate (GENERIC EQUIVALENT) 250 MG capsule   No No   Sig: Take 6 capsules (1,500 mg) by mouth 2  times daily   nystatin (MYCOSTATIN) 454427 UNIT/ML suspension   No No   Sig: Swish and swallow 10 mLs (1,000,000 Units) in mouth 4 times daily   order for DME  Self No No   Sig: Auto CPAP 8-15 cmH20   pantoprazole (PROTONIX) 40 MG EC tablet   No No   Sig: Take 1 tablet (40 mg) by mouth every morning (before breakfast)   polyethylene glycol (MIRALAX) 17 g packet   No No   Si g by Oral or Feeding Tube route daily   predniSONE (DELTASONE) 10 MG tablet   No No   Sig: Take 1.5 tablets (15 mg) by mouth every morning AND 1 tablet (10 mg) every evening.   rosuvastatin (CRESTOR) 10 MG tablet   No No   Sig: Take 1 tablet (10 mg) by mouth daily   senna-docusate (SENOKOT-S/PERICOLACE) 8.6-50 MG tablet   No No   Sig: Take 1 tablet by mouth 2 times daily as needed for constipation   sitagliptin (JANUVIA) 100 MG tablet   No No   Sig: Take 1 tablet (100 mg) by mouth daily   sulfamethoxazole-trimethoprim (BACTRIM) 400-80 MG tablet   No No   Sig: Take 1 tablet by mouth daily   tacrolimus (GENERIC EQUIVALENT) 0.5 MG capsule   No No   Sig: ON HOLD- for medication dose changes.   tacrolimus (GENERIC EQUIVALENT) 1 MG capsule   No No   Sig: Take 6 capsules (6 mg) by mouth every morning AND 5 capsules (5 mg) every evening.   tamsulosin (FLOMAX) 0.4 MG capsule   No No   Sig: Take 1 capsule (0.4 mg) by mouth daily   valGANciclovir (VALCYTE) 450 MG tablet   No No   Sig: Take 1 tablet (450 mg) by mouth daily      Facility-Administered Medications: None             Review of Systems:   A complete, 10 point ROS was performed and is negative other than what is stated in the HPI.         Physical Exam:   Vitals: temp 98.0F, HR 90, /71, SpO2 98% on room air. Weight 175lbs.     Gen: In no acute distress. Patient comfortably lying in bed. Answers questions appropriately.   HEENT: No scleral icterus, Moist mucous membranes. No nasal discharge. Elevated JVD.  CV: Normal rate, regular rhythm. S1/S2 normal.  No murmurs, rubs or gallops   Resp:  Clear to auscultation bilaterally. Without wheeze or crackles  Abdomen: Soft, non tender abdomen, normal active bowel sounds,   Extremities: 2+ peripheral edema in lower legs up to knees b/l   Skin: without rash, petechiae or jaundice   Neuro: alert and oriented x3, CN grossly intact. Moving all extremities. Fluent speech.          Data:   Labs and Imaging Reviewed in Chart   Pertinent studies as below:  Glucoses: 336 --> 260.   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     Steroid induced hyperglycemia   T2DM   Increased glucoses since heart transplant and starting prednisone therapy. Slowly tapering prednisone however glucoses remain elevated despite increase in NPH recently. Following with endocrine as outpatient. No evidence of DKA or HHS. No evidence of infection or acute illness driving hyperglycemia.   - Finger sticks q4h overnight; hypoglycemic protocol   - Continue NPH 50 U in AM and 40 U in PM   - medium dose sliding scale insulin  - Consult endocrine in AM   - holding januvia and amaryl given DAYA      Status post Heart Transplantation on 7/19/20 due to ICM   Primary graft dysfunction, resolved  Postoperative course was complicated by primary graft dysfunction on POD1 with Echo consistent with EF 20-25% and severe RV dysfunction secondary to prolonged ischemic time. Graft function returned to normal on POD 6. He notes his lower extremity edema has been stable since discharge. Appears mildly volume overloaded with 2+ LE edema and elevated JVD.   - 40mg IV lasix tonight, reassess in AM   - Tacrolimus trough level tomorrow   Immunosuppression   - Tacrolimus 6mg in AM and 5mg in evening(goal 10-12)   - Mycophenolate 1500mg po BID  - prednisone po 15 in AM and 10mg  in PM  Prophylaxis: continue Valcyte, bactrim and nystatin       DAYA on CKD stage 3  Baseline Cr 1.5-1.7. On admission, Cr up to 2.0.  Suspect due to volume overload.   - Diuresis as above   - Check tacrolimus level in AM (last dose 2108 8/24)  - UA   - BMP in AM     Acute on chronic normocytic anemia   Recent Hgb 6.9 to 7.7. He has had leukopenia and anemia due to bone marrow suppression due to Cellcept. There had been discussion on decreasing dose. Denies hematemesis, hematochezia, or melena to suggest blood loss although his he is anticoagulated and at increased risk. Suspect anemia of chronic disease and marrow suppression most likely etiology. He is volume overloaded on exam which may be contributing to low Hgb as well.   - iron studies, peripheral smear, reticulocyte count   - Type and screen, consented for blood products   - Repeat Hgb in AM, if <7 will transfuse  - monitor stools and urine for blood loss     Hyponatremia   Na 132. Hypervolemic on exam.   - diuresis as above     Hypertension   - continue amlodipine 5mg daily     RIJ and RUE DVT, provoked   US 7/22/20 consistent with nonocclusive thrombus in the right internal jugular vein along the intravenous catheter, nonocclusive thrombus along the right PICC line in the right axillary vein demonstrated, and occlusive thrombus in the superficial right cephalic vein demonstrated as above. +HIT and started on fondaparinux. Plan for 3 months of therapy and then reassess.   - Continue Fondaparinux    Urinary retention   - continue PTA tamsulosin       Access: PIV   Diet/IVF: cardiac diet, carb consistent   DVT ppx: PTA fondaparinux   Disposition/Admission Status: Cards 2     CODE: Full code     Patient will be formally staffed in the AM.    Kristofer Bradley MD  Internal Medicine-PGY3  Text page

## 2020-08-25 NOTE — LETTER
"8/25/2020       RE: Lucian Henderson Sr.  [address] [facility] [address]     Dear Colleague,    Thank you for referring your patient, Lucian Henderson Sr., to the [facility] ENDOCRINOLOGY at [facility]. Please see a copy of my visit note below.    Lucian Henderson Sr. is a 64 year old male who is being evaluated via a billable video visit.      The patient has been notified of following:     \"This video visit will be conducted via a call between you and your physician/provider. We have found that certain health care needs can be provided without the need for an in-person physical exam.  This service lets us provide the care you need with a video conversation.  If a prescription is necessary we can send it directly to your pharmacy.  If lab work is needed we can place an order for that and you can then stop by our lab to have the test done at a later time.    Video visits are billed at different rates depending on your insurance coverage.  Please reach out to your insurance provider with any questions.    If during the course of the call the physician/provider feels a video visit is not appropriate, you will not be charged for this service.\"    Patient has given verbal consent for Video visit? Yes  How would you like to obtain your AVS? MyChart    *Please text video visit link to: [phone]* (doximity)*    Will anyone else be joining your video visit? No      Video-Visit Details    Type of service:  Video Visit    Video Start Time: 3:39 PM  Video End Time: 3:49 PM    Originating Location (pt. Location): Other [facility] hospital - admitted again    Distant Location (provider location):  [facility] ENDOCRINOLOGY     Liyah Snowden MA    Patients Glucose Data was obtained via telephone and located in my note.     Lucian Dalearza  is a 64 year old male who is being evaluated via a billable telephone visit.        Liyah Snowden MA    Diabetes " Virtual Consult Note  Marisabel Prieto PA-C  August 25, 2020    Lucian Henderson Sr. is a 64 year old male with a past medical history including  has a past medical history of CAD (coronary artery disease), CHF (congestive heart failure) (H), CKD (chronic kidney disease), stage III (H), Cortical cataract of both eyes, Diabetes (H), Hyperlipidemia, Hypertension, Ischemic cardiomyopathy, Obesity, CHANTEL (obstructive sleep apnea), and Osteoarthritis. He also has no past medical history of Chronic infection, Complication of anesthesia, COPD (chronic obstructive pulmonary disease) (H), Heart murmur, Irregular heart beat, Liver disease, Other chronic pain, or Uncomplicated asthma.    He is here for f/u of diabetes.      He was admitted to [facility] 07/03 for heart failure exacerbation with cardiogenic shock, underwent right subclavian IABP insertion 07/16 with Dr. Sparrow, and then heart transplant 07/20 with Dr. Griselli.  He was hospitalized 31 days through 8/3/2020.      Patient was started on ASA, crestor. Hydralazine was started for hypertension, this was titrated to 75 mg TID at time of discharge. Patient was diuresed with IV lasix and will be discharged on 40 mg PO in the am, 20 mg PO in the afternoon. Chest tubes were removed when drainage decreased and follow-up CXR was negative for any significant pneumothorax. Terbutaline was weaned off prior to discharge and TPW were removed when appropriate. Immunosuppression per cardiology, was discharged on tacrolimus 4 mg PO BID and prednisone 20 mg BID.     Lucian tells me that he is again hospitalized due to high blood sugars.  He states that at the last check his blood sugar now is at goal though he does not know how or what medications are being used to achieve this.  He states that he does not yet know what medication he is supposed to go home on but he believes he is going home this afternoon or tomorrow morning.  He notes that they have not been giving his pills and he  believes he is just taking insulin.  He does have his pen needles at home now.    We reviewed glucometer, pump and CGMS data together.  It revealed:  08/19/2020  283 (fasting)     08/20/2020  263 (fasting)  188     08/21/2020  232 (fasting)  253     08/22/2020  233 (fasting)     08/23/2020  233 (fasting)     08/24/2020  333 (fasting)  430      All fasting.        Janice  has a past medical history of CAD (coronary artery disease), CHF (congestive heart failure) (H), CKD (chronic kidney disease), stage III (H), Cortical cataract of both eyes, Diabetes (H), Hyperlipidemia, Hypertension, Ischemic cardiomyopathy, Obesity, CHANTEL (obstructive sleep apnea), and Osteoarthritis. He also has no past medical history of Chronic infection, Complication of anesthesia, COPD (chronic obstructive pulmonary disease) (H), Heart murmur, Irregular heart beat, Liver disease, Other chronic pain, or Uncomplicated asthma.  No current facility-administered medications for this visit.      No current outpatient medications on file.     Facility-Administered Medications Ordered in Other Visits   Medication     acetaminophen (TYLENOL) tablet 650 mg     calcium carbonate 600 mg-vitamin D 400 units (CALTRATE) per tablet 1 tablet     Continuing ACE inhibitor/ARB/ARNI from home medication list OR ACE inhibitor/ARB/ARNI order already placed during this visit     Continuing beta blocker from home medication list OR beta blocker order already placed during this visit     glucose gel 15-30 g    Or     dextrose 50 % injection 25-50 mL    Or     glucagon injection 1 mg     fondaparinux ANTICOAGULANT (ARIXTRA) injection 7.5 mg     HOLD nitroGLYcerin IF     hydrALAZINE (APRESOLINE) tablet 75 mg     insulin aspart (NovoLOG) injection (RAPID ACTING)     insulin aspart (NovoLOG) injection (RAPID ACTING)     insulin aspart (NovoLOG) injection (RAPID ACTING)     insulin isophane human (HumuLIN N PEN) injection 40 Units     insulin isophane human (HumuLIN N PEN)  "injection 50 Units     methocarbamol (ROBAXIN) tablet 500 mg     mycophenolate (GENERIC EQUIVALENT) capsule 1,500 mg     nystatin (MYCOSTATIN) suspension 1,000,000 Units     pantoprazole (PROTONIX) EC tablet 40 mg     polyethylene glycol (MIRALAX) Packet 17 g     predniSONE (DELTASONE) tablet 10 mg     predniSONE (DELTASONE) tablet 15 mg     rosuvastatin (CRESTOR) tablet 10 mg     sulfamethoxazole-trimethoprim (BACTRIM) 400-80 MG per tablet 1 tablet     tacrolimus (GENERIC EQUIVALENT) capsule 5 mg     tacrolimus (GENERIC EQUIVALENT) capsule 6 mg     tamsulosin (FLOMAX) capsule 0.4 mg     [START ON 8/27/2020] valGANciclovir (VALCYTE) tablet 450 mg              Lucian's family history includes Diabetes in his brother, sister, and sister.    ROS:   Patient denies any fevers, chills or sweats as well as any changes in or problems with vision new or different headaches.  Patient denies symptoms of hypoglycemia except as above.  He is having fatigue and polyuria polydipsia and his blood sugars are above 400.  Patients denies marked fatigue, cough, shortness of breath, chest pain or pressure.  He does have chest pain from recent surgery this is improving.  There has been no pain with or other changes in urination or  itching or pain in genital areas.  Patient denies any noted swelling in feet, ankles or otherwise, loss of sensation or pain  in feet or other areas.    Patient also denies current difficulties with depressed mood, anhedonia or worrying too much.        Exam:    VIDEO VISIT    Lucian is alerted and oriented.  He is present with his wife  Mood is \"good,\" affect is congruent.  Thoughtful form and content are fluid and coherent.  No signs of distress are appreciated.    It appears that he has at least one NovoLog FlexPen at home however there is an orange apparatus on the end of it that he does not seem to be able to remove.  He and his wife tried to get the pen needles that they have onto both the NovoLog and " the Humalog pens and were unable to they do fit the NPH pens.    Lucian does appear a bit pale.      Data:      Most recent:  Lab Results   Component Value Date    CR 1.67 08/17/2020     Lab Results   Component Value Date     08/17/2020       GFR Estimate   Date Value Ref Range Status   08/25/2020 34 (L) >60 mL/min/[1.73_m2] Final     Comment:     Non  GFR Calc  Starting 12/18/2018, serum creatinine based estimated GFR (eGFR) will be   calculated using the Chronic Kidney Disease Epidemiology Collaboration   (CKD-EPI) equation.     08/24/2020 34 (L) >60 mL/min/[1.73_m2] Final     Comment:     Non  GFR Calc  Starting 12/18/2018, serum creatinine based estimated GFR (eGFR) will be   calculated using the Chronic Kidney Disease Epidemiology Collaboration   (CKD-EPI) equation.     08/17/2020 42 (L) >60 mL/min/[1.73_m2] Final     Comment:     Non  GFR Calc  Starting 12/18/2018, serum creatinine based estimated GFR (eGFR) will be   calculated using the Chronic Kidney Disease Epidemiology Collaboration   (CKD-EPI) equation.       GFR Estimate If Black   Date Value Ref Range Status   08/25/2020 40 (L) >60 mL/min/[1.73_m2] Final     Comment:      GFR Calc  Starting 12/18/2018, serum creatinine based estimated GFR (eGFR) will be   calculated using the Chronic Kidney Disease Epidemiology Collaboration   (CKD-EPI) equation.     08/24/2020 40 (L) >60 mL/min/[1.73_m2] Final     Comment:      GFR Calc  Starting 12/18/2018, serum creatinine based estimated GFR (eGFR) will be   calculated using the Chronic Kidney Disease Epidemiology Collaboration   (CKD-EPI) equation.     08/17/2020 48 (L) >60 mL/min/[1.73_m2] Final     Comment:      GFR Calc  Starting 12/18/2018, serum creatinine based estimated GFR (eGFR) will be   calculated using the Chronic Kidney Disease Epidemiology Collaboration   (CKD-EPI) equation.           Lab Results    Component Value Date    A1C 8.2 (H) 07/03/2020    A1C 7.9 (H) 07/08/2019    A1C 8.5 (H) 03/11/2019    A1C 7.6 (H) 10/16/2018    A1C 9.8 (H) 09/06/2017    HEMOGLOBINA1 8.3 (A) 08/06/2018    HEMOGLOBINA1 10.6 (A) 04/30/2018     Lab Results   Component Value Date    MICROL 6 10/03/2018     No results found for: MICROALBUMIN  No results found for: CPEPT, GADAB, ISCAB  Cholesterol   Date Value Ref Range Status   08/17/2020 118 <200 mg/dL Final   07/08/2019 104 <200 mg/dL Final     HDL Cholesterol   Date Value Ref Range Status   08/17/2020 75 >39 mg/dL Final   07/08/2019 27 (L) >39 mg/dL Final     LDL Cholesterol Calculated   Date Value Ref Range Status   08/17/2020 26 <100 mg/dL Final     Comment:     Desirable:       <100 mg/dl   07/08/2019 41 <100 mg/dL Final     Comment:     Desirable:       <100 mg/dl     Triglycerides   Date Value Ref Range Status   08/17/2020 82 <150 mg/dL Final   07/08/2019 181 (H) <150 mg/dL Final     Comment:     Borderline high:  150-199 mg/dl  High:             200-499 mg/dl  Very high:       >499 mg/dl       Cholesterol/HDL Ratio   Date Value Ref Range Status   06/27/2015 2.6 0.0 - 5.0 Final   01/11/2015 6.0 (H) 0.0 - 5.0 Final                   Assessment/Plan:    Lucian is a 64 year old male with  has a past medical history of CAD (coronary artery disease), CHF (congestive heart failure) (H), CKD (chronic kidney disease), stage III (H), Cortical cataract of both eyes, Diabetes (H), Hyperlipidemia, Hypertension, Ischemic cardiomyopathy, Obesity, CHANTEL (obstructive sleep apnea), and Osteoarthritis. He also has no past medical history of Chronic infection, Complication of anesthesia, COPD (chronic obstructive pulmonary disease) (H), Heart murmur, Irregular heart beat, Liver disease, Other chronic pain, or Uncomplicated asthma.        Jose is a 64-year-old man who is one-month status post heart transplant with uncontrolled type 2 diabetes exacerbated by steroids now hospitalized again with BG  above goal, acute kidney injury with Januvia and Amaryl now being held.  Acute kidney injury also possibly due to supratherapeutic tacrolimus level.        >50% of 10 minute visit spent in counseling, education and coordination of care coaching patient to be sure he understands plan and has our number to call if BG above goal again when goes home.      It is my privilege to be involved in the care of the above patient.     Marisabel Prieto PA-C, [facility]  [facility]  Diabetes, Endocrinology, and Metabolism  [phone] Appointments/Nurse  [phone] pager  [phone] nurse line    This note was completed in part using Dragon voice recognition, and may contain word and grammatical errors.                  This encounter was opened in error. Please disregard.    Again, thank you for allowing me to participate in the care of your patient.      Sincerely,    Marisabel Prieto PA-C

## 2020-08-25 NOTE — UTILIZATION REVIEW
Admission Status; Secondary Review Determination     Under the authority of the Utilization Management Commitee, the utilization review process indicated a secondary review on the above patient. The review outcome is based on review of the medical records, discussions with staff, and applying clinical experience noted on the date of the review.     (x) Inpatient Status Appropriate - This patient's medical care is consistent with medical management for inpatient care and reasonable inpatient medical practice.     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  The patient is on insulin and endocrinology has been consulted to assist with medication titration.  There is concern that his ARF may be due to tacrolimus.  He is receiving a unit of PRBC today as well as a dose of IV Lasix.  Given his recent transplant surgery and very complex medical history in the setting of worsening renal function and anemia with uncontrolled hyperglycemia he is at higher risk for poor clinical outcome and inpatient status appears appropriate.      At the time of admission with the information available to the attending physician more than 2 nights Hospital complex care was anticipated, based on patient risk of adverse outcome if treated as outpatient and complex care required. Inpatient admission is appropriate based on the Medicare guidelines.    The information on this document is developed by the utilization review team in order for the business office to ensure compliance. This only denotes the appropriateness of proper admission status and does not reflect the quality of care rendered.   The definitions of Inpatient Status and Observation Status used in making the determination above are those provided in the CMS  Coverage Manual, Chapter 1 and Chapter 6, section 70.4.     Sincerely,     Lloyd Cano MD  Utilization Review   Physician Advisor   [facility]

## 2020-08-25 NOTE — PROGRESS NOTES
"CLINICAL NUTRITION SERVICES - ASSESSMENT NOTE     Nutrition Prescription    RECOMMENDATIONS FOR MDs/PROVIDERS TO ORDER:  Encourage oral intake     Malnutrition Status:    Unable to determine due to inability to complete all parameters of malnutrition     Recommendations already ordered by Registered Dietitian (RD):  Ordered CRP (add on)   SF gelatein x2 @ HS     Future/Additional Recommendations:  Monitor appetite and oral intake with need for additional snacks or supplements   Monitor need for additional nutrition education      REASON FOR ASSESSMENT  Lucian Dalearza Sr. is a/an 64 year old male assessed by the dietitian for Provider Order - hypoalbuminemia, signifcant LE edema     CLINICAL HISTORY   CM and HFrEF s/p OHT 7/19/2020, DMII, CKD stage 3, RUE DVT c/b HIT, and HTN who presents with hyperglycemia.     NUTRITION HISTORY  Patient reports he has been eating very well and appetite has been great. He is eating meat, chicken, beans, fish and eggs at all meals. Also eats salads and vegetables.He reported being very strict on not eating canned or frozen items and eats fresh vegetables. They do not add any extra salt to foods at home. Endorsed some difficulties with chewing due to having his molars removed but eats very slowly and chews well with his front teeth. He was checking blood sugars very frequently.     CURRENT NUTRITION ORDERS  Diet: 2 g Sodium and High Consistent Carbohydrate, 2 L fluid restriction     Intake/Tolerance: No intakes documented     LABS  Glucose 231 (endocrinology following)   Albumin 2.7 (L)       Poor indicator of nutritional status, especially with stress factors and likely increased acute phase response.  Fluid status may play a factor, especially with two to three week half-life.    MEDICATIONS  Calcium carbonate 600 mg vitamin D 400 units   Lasix   Novolog/Humulin   Mycophenolate  Protonix  Miralax  Prednisone  Tacrolimus     ANTHROPOMETRICS  Height: 165 cm (5' 5\")  Most " Recent Weight: 81.8 kg (180 lb 5.4 oz)    IBW: 61.8 kg  BMI: Obesity Grade I BMI 30-34.9  Weight History:   Wt Readings from Last 15 Encounters:   08/25/20 81.8 kg (180 lb 5.4 oz)   08/17/20 79.8 kg (176 lb)   08/10/20 79.8 kg (176 lb)   08/03/20 80.5 kg (177 lb 6.4 oz)   07/02/20 79.8 kg (176 lb)   12/10/19 93.4 kg (206 lb)   12/03/19 91.2 kg (201 lb)   11/25/19 93 kg (205 lb)   11/25/19 92.8 kg (204 lb 8 oz)   08/26/19 93.2 kg (205 lb 8 oz)   08/08/19 90.7 kg (200 lb)   07/29/19 93.3 kg (205 lb 11.2 oz)   07/17/19 90.9 kg (200 lb 4.8 oz)   07/09/19 90.3 kg (199 lb)   05/17/19 93.4 kg (206 lb)   14% weight loss December 2019-July 2020, possible related to fluid and per per review of notes, weight loss pre-transplant was intentional. No weight loss noted since July 2020.     Dosing Weight: 66 kg (adjusted)     ASSESSED NUTRITION NEEDS  Estimated Energy Needs: 7103-0887 kcals/day (30 - 35 kcals/kg )  Justification: Increased needs  Estimated Protein Needs:  grams protein/day (1.3 - 2 grams of pro/kg)  Justification: Increased needs  Estimated Fluid Needs: 1650+ mL/day (25 mL/kg)   Justification: Maintenance    PHYSICAL FINDINGS  Reviewed per physician and nursing notes     MALNUTRITION  % Intake: No decreased intake noted  % Weight Loss: None noted since July  Subcutaneous Fat Loss: unable to assess   Muscle Loss: Unable to assess  Fluid Accumulation/Edema: Moderate  Malnutrition Diagnosis: Unable to determine due to inability to complete all parameters of malnutrition. Patient does not meet criteria without NFPE.     NUTRITION DIAGNOSIS  Increased nutrient needs (energy and protein related to increased metabolic demand as evidenced by recent transplant     INTERVENTIONS  Implementation  Nutrition Education: RD role in care    Medical food supplement therapy     Goals  Patient to consume % of nutritionally adequate meal trays TID, or the equivalent with supplements/snacks.    Euglycemia vs blood glucose  <180 mg/dL      Monitoring/Evaluation  Progress toward goals will be monitored and evaluated per protocol.    Lisa Jimenez RD, LOC  6B pager: [phone]

## 2020-08-25 NOTE — PROGRESS NOTES
Neuro: A&Ox4   Cardiac: Afebrile, VSS, NSR.   Respiratory: RA  GI/: Voiding spontaneously, diuresing with lasix. No BM this shift.   Diet/appetite: Tolerating CHO diet. Denies nausea. BG q4h, elevated sugars   Activity: Up with SBA     Pain: . Denies   Skin: No new deficits noted. Midline sternal incision CDI   Lines: PIV saline locked   Replacement: hgb recheck today with labs- 6.7, 1u PRBCs ordered and infusing currently.     Pt has been resting comfortably throughout night, will continue to monitor and follow plan of care.

## 2020-08-25 NOTE — CONSULTS
"Diabetes/Hyperglycemia Management Consult    Chief Complaint steroid induced hyperglycemia  Consult requested by: Dr Lares  History of Present Illness Lucian Henderson Sr is a 64 year old male with uncontrolled type 2 diabetes, hypertension, CKD stage 3, hx of RIJ clot and remains on fondaparinux due to HIT hx, ICM s/p OHT 7/19/2020, admitted late on 8/24 for hyperglycemia.  History obtained from chart review, patient, and patient's wife ( via telephone).  Known to our service from his index admission, last seen on day of discharge 8/3/2020.  He had virtual clinic follow up last Tuesday 8/18 at which time his NPH doses were increased.  Outpatient notes and patient/wife report they did not have the correct needles for Novolog or Humalog insulin pens at home (they did fit the NPH pens) and report there were some issues with the home glucometer (no values appear- Lucian wonders if is is \"full\").  New needles were obtained last weekend.  Lucian's wife keeps track of the medications and confirms he has been taking the NPH in morning and evening (50 units in AM and 40 units in PM since 8/21), taking glimepiride, taking sitagliptin, and using some insulin from the scale (\"20 units yesterday at 1130\" and before that it's not clear if they were using the correction scale from last discharge).  Prednisone was decreased to 15 mg in AM and 10 mg in PM on 8/19 and due to taper to 10 mg BID next week if negative biopsy.  Lucian denied any significant change in his eating pattern (TID meals 0700, 1300, 1800 and occasional fruit for a snack).  He is on a fluid restriction.  He has been feeling fatigued and dizzy and weak when his glucose is high.  This morning, he feels improved and was up to walk.  Reports his legs are weak, but the rest of his body feels good.  LE edema continues, but he thinks it is slightly better.  Tolerated his breakfast fine and says he never has GI complaints.  Glucose was up to " 500s and mostly trended in 300s at home per Lucian.  Clinic meter documentation shows values high 100s, often 200s-300s.    Last evening, glucose 300s on presentation.  Received NPH 40 units near 2300, at which time Lucian was eating a box lunch.  Did not received any fluids.  Glucose improved to 170s by 0800 this morning.  Following NPH 50 units this morning and aspart 1 units per 5 grams carb (glimepiride and sitagliptin held), glucose improved further, to 130s before lunch.  Notably, creatinine is higher than baseline (~1.4) at present: 2 mg/dL.  He is still hypervolemic and receiving diuretics.    Patient was interested in a continuous glucose monitoring system.  He has Freestyle Navjot included on his medlist, but was not able to get any new monitoring device at [facility] as of Friday or Saturday.  Per pharmacy liaison, he needs to use his [facility] Medicare benefit (can't use prescription benefit) for CGMS.  For Freestyle Navjot, use [employer] [phone]. For Dexcom, use [employer] [phone].  Will convey this info to outpatient provider.    Recent Labs   Lab 08/25/20  1227 08/25/20  1136 08/25/20  0804 08/25/20  0606 08/25/20  0445 08/25/20  0210 08/24/20  2211 08/24/20 2026   GLC  --   --   --   --  231*  --   --  269*   * 134* 173* 211*  --  342* 258*  --          Diabetes Type: 2  Diabetes Duration: 20+ years  Usual Diabetes Regimen:   5x /day BG monitoring frequency  glimepride 6 mg daily   sitagliptin 100 mg daily (not renally adjusted, though note   NPH 50 units + 40 units, give with prednisone (15 mg + 10 mg)  Lispro correction scale      Discharge Plan: ( plan is in AVS, and updated today--8/3/2020)  (Prednisone currently 20 mg BID, may decrease to 20 + 15 soon)  NPH 35 units at 8 am with prednione  Glimepiride 6 mg with breakfast  sitagliptin 100 mg daily  test blood sugars before meals and use   Novolog correction scale before meals only (none at bedtime)  Do Not give Correction  Insulin if Pre-Meal BG less than 140    -169 give 2 units.    -199 give 4 units.    -229 give 6 units.    -259 give 8 units.    -289 give 10 units.    -319 give 12 units.    -349 give 14 units.    BG >/= 350 give 16 units.         Ability to [place] Prescribed Regimen: wife supervises dosing  Diabetes Control:   Lab Results   Component Value Date    A1C 8.2 07/03/2020    A1C 7.9 07/08/2019    A1C 8.5 03/11/2019    A1C 7.6 10/16/2018    A1C 9.8 09/06/2017     Diabetes Complications: proliferative retinopathy, CKD, CAD  History of DKA: no  Able to Detect Hypoglycemia: yes  Usual Diabetes Care Provider: Marisabel Prieto PA-C  Factors Impacting Glucose Control: unclear administration issues, steroids, meter inaccuracy?      Review of Systems  10 point ROS completed with pertinent positives and negatives noted in the HPI    Past medical, family and social histories are reviewed and updated.    Past Medical History  Past Medical History:   Diagnosis Date     CAD (coronary artery disease)      CHF (congestive heart failure) (H)      CKD (chronic kidney disease), stage III (H)      Cortical cataract of both eyes      Diabetes (H)      Hyperlipidemia      Hypertension      Ischemic cardiomyopathy      Obesity      CHANTEL (obstructive sleep apnea)     occas cpap     Osteoarthritis        Family History  Family History   Problem Relation Age of Onset     Diabetes Brother      Diabetes Sister      Diabetes Sister      Macular Degeneration No family hx of      Glaucoma No family hx of      Myocardial Infarction No family hx of      Kidney Disease No family hx of        Social History  Social History     Socioeconomic History     Marital status:      Spouse name: Not on file     Number of children: 4     Years of education: Not on file     Highest education level: Not on file   Occupational History     Occupation:      Employer: [employer]     Employer: RETIRED    Social Needs     Financial resource strain: Not on file     Food insecurity     Worry: Not on file     Inability: Not on file     Transportation needs     Medical: Not on file     Non-medical: Not on file   Tobacco Use     Smoking status: Never Smoker     Smokeless tobacco: Never Used     Tobacco comment: Never smoked; non-smoking household   Substance and Sexual Activity     Alcohol use: No     Alcohol/week: 0.0 standard drinks     Drug use: No     Sexual activity: Yes     Partners: Female   Lifestyle     Physical activity     Days per week: Not on file     Minutes per session: Not on file     Stress: Not on file   Relationships     Social connections     Talks on phone: Not on file     Gets together: Not on file     Attends Sabianist service: Not on file     Active member of club or organization: Not on file     Attends meetings of clubs or organizations: Not on file     Relationship status: Not on file     Intimate partner violence     Fear of current or ex partner: Not on file     Emotionally abused: Not on file     Physically abused: Not on file     Forced sexual activity: Not on file   Other Topics Concern     Parent/sibling w/ CABG, MI or angioplasty before 65F 55M? No   Social History Narrative     Not on file         Physical Exam  Temp: 98  F (36.7  C) Temp src: Oral BP: 114/54 Pulse: 93   Resp: 16 SpO2: 100 % O2 Device: None (Room air)    Wt Readings from Last 4 Encounters:   08/25/20 81.8 kg (180 lb 5.4 oz)   08/17/20 79.8 kg (176 lb)   08/10/20 79.8 kg (176 lb)   08/03/20 80.5 kg (177 lb 6.4 oz)     Body mass index is 30.01 kg/m .    General:  pleasant middle aged man resting in bed, in no distress. Slightly disheveled  HEENT: NC/AT, PER and anicteric, non-injected, oral mucous membranes moist.   Lungs: unlabored respiration, no cough  ABD: rounded  Skin: warm and dry, no obvious lesions  MSK:  fluid movement of all extremities  Lymp: bilateral pitting, LE edema   Mental status:  alert, oriented x3,  communicating clearly  Psych:  calm, even mood    Laboratory  Recent Labs   Lab Test 08/25/20 0445 08/24/20 2026    132*   POTASSIUM 4.9 5.0   CHLORIDE 102 101   CO2 24 23   ANIONGAP 7 8   * 269*   BUN 64* 67*   CR 1.97* 1.97*   BRYANNA 8.1* 8.2*     CBC RESULTS:   Recent Labs   Lab Test 08/25/20 0445   WBC 4.3   RBC 2.10*   HGB 6.7*   HCT 21.3*   *   MCH 31.9   MCHC 31.5   RDW 21.2*          Liver Function Studies -   Recent Labs   Lab Test 08/24/20 2026   PROTTOTAL 5.7*   ALBUMIN 2.7*   BILITOTAL 0.5   ALKPHOS 124   AST 12   ALT 28       Active Medications  Current Facility-Administered Medications   Medication     acetaminophen (TYLENOL) tablet 650 mg     calcium carbonate 600 mg-vitamin D 400 units (CALTRATE) per tablet 1 tablet     Continuing ACE inhibitor/ARB/ARNI from home medication list OR ACE inhibitor/ARB/ARNI order already placed during this visit     Continuing beta blocker from home medication list OR beta blocker order already placed during this visit     glucose gel 15-30 g    Or     dextrose 50 % injection 25-50 mL    Or     glucagon injection 1 mg     fondaparinux ANTICOAGULANT (ARIXTRA) injection 7.5 mg     furosemide (LASIX) injection 40 mg     HOLD nitroGLYcerin IF     hydrALAZINE (APRESOLINE) tablet 75 mg     insulin aspart (NovoLOG) injection (RAPID ACTING)     insulin aspart (NovoLOG) injection (RAPID ACTING)     insulin aspart (NovoLOG) injection (RAPID ACTING)     insulin isophane human (HumuLIN N PEN) injection 40 Units     insulin isophane human (HumuLIN N PEN) injection 50 Units     methocarbamol (ROBAXIN) tablet 500 mg     mycophenolate (GENERIC EQUIVALENT) capsule 1,500 mg     nystatin (MYCOSTATIN) suspension 1,000,000 Units     pantoprazole (PROTONIX) EC tablet 40 mg     polyethylene glycol (MIRALAX) Packet 17 g     predniSONE (DELTASONE) tablet 10 mg     predniSONE (DELTASONE) tablet 15 mg     rosuvastatin (CRESTOR) tablet 10 mg      sulfamethoxazole-trimethoprim (BACTRIM) 400-80 MG per tablet 1 tablet     tacrolimus (GENERIC EQUIVALENT) capsule 5 mg     tacrolimus (GENERIC EQUIVALENT) capsule 6 mg     tamsulosin (FLOMAX) capsule 0.4 mg     [START ON 8/27/2020] valGANciclovir (VALCYTE) tablet 450 mg       Current Diet  Orders Placed This Encounter      Combination Diet 7240-9116 Calories: High Consistent CHO (4-7 CHO units/meal); 2 gm NA Diet        Assessment  Lucian Henderson Sr is a 64 year old male with uncontrolled type 2 diabetes, hypertension, CKD stage 3, hx of RIJ clot and remains on fondaparinux due to HIT hx, ICM s/p OHT 7/19/2020, admitted late on 8/24 for hyperglycemia.  Now experiencing hugely improved glucose control that may be explained by resolution of glucose toxicity and change in administration technique.    Plan    - continue NPH 50 units this morning (will need NPH prescribed on discharge)  - add aspart 1 unit per 5 grams carbohydrate with meals and snacks  - increase aspart correction to custom 2 per 30 mg/dL glucose [facility],  0200  - evening dose NPH 40 units--> decrease to 10 units based on very low need during last hospitalization  - diabetes education consult, technique assessment  - potential for dietician consult to plan for lispro with meals at home  - glimepiride and sitagliptin remain on hold (would need sitagliptin prescribed on discharge)  - plan for new meter and supplies on discharge  - expect will need outpatient provider assist to move CGMS prescription forward  - reschedule outpatient follow up-- Marisabel NAVA [phone]    Diabetes Management Team job code: [id number]    I spent a total of 90 minutes bedside and on the inpatient unit managing the glycemic care of Lucian Henderson Sr.. Over 50% of my time on the unit was spent counseling the patient and wife and/or coordinating care regarding acute BG management.  See note for details.

## 2020-08-25 NOTE — PLAN OF CARE
Neuro: Patient alert and oriented x4. Neuro's intact.   Cardiac: NSR 80's-90's. BP's stable. Afebrile.   Respiratory: Sating >90% on RA. No cough. No SOB.   GI/: Adequate urine output. No BM today.   Diet/appetite: Tolerating combination diet. Eating well.   Activity:  Up independently, up to chair and in halls.  Pain: Denying any pain.   Skin: Midline sternal incision, healing/scabbed over. Old CT sites.   LDA's: L DACIA FARAH.   Plan:   - Lasix given after hemoglobin 6.8. No PRBC's ordered at this time. Will recheck hemoglobin in the AM.   - Latest blood sugar 258, insulin given. Will give long acting insulin when it arrives to unit. Currently eating.   - UA to be collected.   - Asymptomatic COVID swab sent. Results pending.   Will continue to monitor.

## 2020-08-25 NOTE — PROGRESS NOTES
[facility]   Cardiology II Service / Advanced Heart Failure  Daily Progress Note      Patient: Lucian Henderosn Sr.  MRN: [id number]  Admission Date: 8/24/2020  Hospital Day # 1    Assessment and Plan: Lucian Henderson Sr. is a 64 year old male with history of ICM and HFrEF s/p OHT 7/19/2020, DMII, CKD stage 3, RUE DVT c/b HIT, and HTN who presents with hyperglycemia and acute on chronic anemia.     Today's Plan:  -awaiting endocrine recs  -lymphedema consult  -f.u peripheral smear  -add on LDH  -1 unit pRBC, monitor Hgb   -discontinue amlodipine  -nutrition consult  -IV lasix 40 mg once today   -check g6pd to see if we can switch bactrim to dapsone     #Steroid-induced hyperglycemia, symptomatic   #T2DM   Increased BS since transplant and prednisone. Controlled during index admission. Recently increase by NPH as outpatient. No evidence of DKA or HHS. No evidence of infection or acute illness driving hyperglycemia. Negative ketones, UA with glucose.   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 Graft function returned to normal on POD 6. IV lasix given on admission.     Graft Function:   --Volume: centrally slightly fluid up, significant LE edema/third spacing, ~4lb above EDW, IV lasix 40 mg again today  --BP: 100s-120s/60s-70s on amlodipine 5 mg and hydralazine 75 mg tid, see below  --HR: 90s off terbutaline    Immunosuppression:   --prednisone per taper with negative bx, currently 15 mg in AM 10 mg in PM  --Cellcept 1500 mg BID  --tacrolimus 6 mg in AM 5 mg in PM (goal 10-12), last trough 5.5,  pending today    Serostatus: CMV: D+/R+, EBV: D+/R+, Toxo: D+/R-    Prophylaxis:   --CAV: no aspirin as on fondaparinux, continue rosuvastatin 10 mg daily  --Thrush: Nystatin   --PCP: Bactrim single strength daily   --GI: Protonix   --Osteoporosis: calcium/vitamin D   --CMV: renally dosed Valcyte 450 mg EOD x 3 mos  --Toxo: Bactrim lifelong given D+     Routine Surveillance:   --next biopsy due 8/31     #DAYA on CKD stage III  Baseline Cr 1.5-1.7. On admission, Cr 2. Likely cardiorenal, supratherapeutic tacrolimus level possible. UA bland.   - diuresis as above   - f/u tacrolimus level  - daily BMP     #Acute on chronic normocytic anemia likely 2/2 marrow suppression  Recent Hgb 6.9-7.7. He has had leukopenia and anemia due to bone marrow suppression due to Cellcept. Denies hematemesis, hematochezia, or melena to suggest blood loss although his he is anticoagulated and at increased risk. Suspect anemia of chronic disease and marrow suppression most likely etiology. He is volume overloaded on exam which may be contributing.  - iron studies, folate, vitB12 wnl  - peripheral smear pending  - haptoblogin and bili wnl, low concern for hemolysis, add on LDH  - q12hr CBC, transfuse <7  - defer Cellcept decrease given low tacro level last week     #Bilateral LE edema/third spacing  #Hypoalbuminemia  Present since transplant despite near normal filling pressures. Likely exacerbated by amlodipine. Albumin 2.7  -lymphedema consult  -gentle diuresis, aim for 4 lb off  -discontinue amlodipine as below  -nutritional consult    #Hypervolemic hyponatremia   Na 132 on admission  -diuresis as above      #Hypertension   -discontinue amlodipine given LE edema     #RIJ and RUE DVT, provoked   #+HIT  US 7/22/20 nonocclusive thrombus in the right internal jugular vein along the intravenous catheter, nonocclusive thrombus along the right PICC line in the right axillary vein demonstrated, and occlusive thrombus in the superficial right  "cephalic vein demonstrated as above. +HIT and started on fondaparinux.   -continue Fondaparinux at least 3 months then reassess, will d/w heme ?timing of transition to NOAC     #Urinary retention   -continue PTA tamsulosin     #Donor Streptococcus salivarius bacteremia  Transplant ID consulted post-transplant, teated with ceftriaxone.      Access: PIV   Diet/IVF: cardiac diet, carb consistent   DVT ppx: PTA fondaparinux   Disposition/Admission Status: Cards 2   CODE: Full code     Geovanna Andino DNP, NP-C  Advanced Heart Failure/Cardiology II Service  Pager [phone] [facility] [id number]    ================================================================    Subjective/24-Hr Events:   Last 24 hr care team notes reviewed. No overnight events. Patient reports feeling better than yesterday. States he felt \"drunk\" the last few days but this has resolved. Afebrile, denies chills, nausea, vomiting, diarrhea, shortness of breath, orthopnea. LE edema worse in the last few days. Activity limited due to leg swelling. He denies epistaxis, melena, hematuria, bruising, pain.    ROS:  4 point ROS including respiratory, CV, GI and  (other than that noted in the HPI) is negative.     Medications: Reviewed in EPIC.     Physical Exam:   /65   Pulse 91   Temp 98.1  F (36.7  C) (Oral)   Resp 18   Wt 81.8 kg (180 lb 5.4 oz)   SpO2 99%   BMI 30.01 kg/m      GENERAL: Appears comfortable, in no distress.  HEENT: Eye symmetrical, no discharge or icterus bilaterally. Mucous membranes moist and without lesions.  NECK: Supple, JVD 3cm above clavicle at 90 degrees.   CV: Tachycardic and regular, +S1S2, no murmur, rub, or gallop.   RESPIRATORY: Respirations regular, even, and unlabored. Lungs CTA throughout.    GI: Soft, obese and non distended with normoactive bowel sounds present in all quadrants. No tenderness, rebound, guarding.   EXTREMITIES: 3+ BLE peripheral edema. 2+ bilateral pedal pulses.   NEUROLOGIC: Alert and oriented x 3. No " focal deficits.   MUSCULOSKELETAL: No joint swelling or tenderness.   SKIN: No jaundice. No rashes or lesions.     Labs:  CMP  Recent Labs   Lab 08/25/20 0445 08/24/20 2026    132*   POTASSIUM 4.9 5.0   CHLORIDE 102 101   CO2 24 23   ANIONGAP 7 8   * 269*   BUN 64* 67*   CR 1.97* 1.97*   GFRESTIMATED 34* 34*   GFRESTBLACK 40* 40*   BRYANNA 8.1* 8.2*   MAG 1.8 1.9   PROTTOTAL  --  5.7*   ALBUMIN  --  2.7*   BILITOTAL  --  0.5   ALKPHOS  --  124   AST  --  12   ALT  --  28       CBC  Recent Labs   Lab 08/25/20 0445 08/24/20 2026   WBC 4.3 4.9   RBC 2.10* 2.17*   HGB 6.7* 6.8*   HCT 21.3* 21.9*   * 101*   MCH 31.9 31.3   MCHC 31.5 31.1*   RDW 21.2* 21.2*    193       INR  Recent Labs   Lab 08/25/20 0445   INR 1.11       Time/Communication  I personally spent a total of 25 minutes. Of that 15 minutes was counseling/coordination of patient's care. Plan of care discussed with patient. See my note above for details.    Patient discussed with Dr. Sheridan.

## 2020-08-25 NOTE — PATIENT INSTRUCTIONS
Please call if your blood sugar is commonly above 200 once home.    My best wishes,    Marisabel Prieto PA-C, MPAS  [facility]  Diabetes, Endocrinology, and Metabolism  [phone] Appointments/Nurse  [phone] Fax  [phone] URGENTafter hours/weekend Endocrinologist on call

## 2020-08-25 NOTE — PLAN OF CARE
Neuro: A&Ox4. Primary language is Cameroonian.   Cardiac: SR. VSS.   Respiratory: Sating >98% on RA.  GI/: Adequate urine output via urinal. BM X1  Diet/appetite: Tolerating consistent carb/heart healthy diet with 2L FR. Eating well.  Activity:  Up independently in room, up to chair intermittently throughout the day.   Pain: At acceptable level on current regimen.   Skin: No new deficits noted. Sternal incisions C/D/I, approximated and healing well.   LDA's: L DACIA SL.     Plan: Close monitoring of BGs today to make necessary adjustments to insulin regimen. BGs 130s-170s this shift. Continue with POC. Notify primary team with changes.

## 2020-08-26 ENCOUNTER — TELEPHONE (OUTPATIENT)
Dept: ENDOCRINOLOGY | Facility: CLINIC | Age: 65
End: 2020-08-26

## 2020-08-26 ENCOUNTER — APPOINTMENT (OUTPATIENT)
Dept: INTERPRETER SERVICES | Facility: CLINIC | Age: 65
End: 2020-08-26
Payer: COMMERCIAL

## 2020-08-26 ENCOUNTER — APPOINTMENT (OUTPATIENT)
Dept: OCCUPATIONAL THERAPY | Facility: CLINIC | Age: 65
DRG: 638 | End: 2020-08-26
Attending: NURSE PRACTITIONER
Payer: COMMERCIAL

## 2020-08-26 LAB
ANION GAP SERPL CALCULATED.3IONS-SCNC: 7 MMOL/L (ref 3–14)
BUN SERPL-MCNC: 72 MG/DL (ref 7–30)
CALCIUM SERPL-MCNC: 8.2 MG/DL (ref 8.5–10.1)
CHLORIDE SERPL-SCNC: 104 MMOL/L (ref 94–109)
CO2 SERPL-SCNC: 23 MMOL/L (ref 20–32)
CREAT SERPL-MCNC: 1.77 MG/DL (ref 0.66–1.25)
ERYTHROCYTE [DISTWIDTH] IN BLOOD BY AUTOMATED COUNT: 21 % (ref 10–15)
GFR SERPL CREATININE-BSD FRML MDRD: 39 ML/MIN/{1.73_M2}
GLUCOSE BLDC GLUCOMTR-MCNC: 123 MG/DL (ref 70–99)
GLUCOSE BLDC GLUCOMTR-MCNC: 130 MG/DL (ref 70–99)
GLUCOSE BLDC GLUCOMTR-MCNC: 205 MG/DL (ref 70–99)
GLUCOSE BLDC GLUCOMTR-MCNC: 229 MG/DL (ref 70–99)
GLUCOSE BLDC GLUCOMTR-MCNC: 61 MG/DL (ref 70–99)
GLUCOSE BLDC GLUCOMTR-MCNC: 62 MG/DL (ref 70–99)
GLUCOSE BLDC GLUCOMTR-MCNC: 87 MG/DL (ref 70–99)
GLUCOSE BLDC GLUCOMTR-MCNC: 92 MG/DL (ref 70–99)
GLUCOSE BLDC GLUCOMTR-MCNC: 98 MG/DL (ref 70–99)
GLUCOSE SERPL-MCNC: 91 MG/DL (ref 70–99)
HCT VFR BLD AUTO: 24.7 % (ref 40–53)
HGB BLD-MCNC: 7.8 G/DL (ref 13.3–17.7)
MAGNESIUM SERPL-MCNC: 1.8 MG/DL (ref 1.6–2.3)
MCH RBC QN AUTO: 31 PG (ref 26.5–33)
MCHC RBC AUTO-ENTMCNC: 31.6 G/DL (ref 31.5–36.5)
MCV RBC AUTO: 98 FL (ref 78–100)
PLATELET # BLD AUTO: 190 10E9/L (ref 150–450)
POTASSIUM SERPL-SCNC: 4.7 MMOL/L (ref 3.4–5.3)
RBC # BLD AUTO: 2.52 10E12/L (ref 4.4–5.9)
SARS-COV-2 RNA SPEC QL NAA+PROBE: NOT DETECTED
SODIUM SERPL-SCNC: 134 MMOL/L (ref 133–144)
SPECIMEN SOURCE: NORMAL
TACROLIMUS BLD-MCNC: 6.7 UG/L (ref 5–15)
TME LAST DOSE: NORMAL H
WBC # BLD AUTO: 4.3 10E9/L (ref 4–11)

## 2020-08-26 PROCEDURE — 83735 ASSAY OF MAGNESIUM: CPT | Performed by: STUDENT IN AN ORGANIZED HEALTH CARE EDUCATION/TRAINING PROGRAM

## 2020-08-26 PROCEDURE — 97140 MANUAL THERAPY 1/> REGIONS: CPT | Mod: GO

## 2020-08-26 PROCEDURE — 97165 OT EVAL LOW COMPLEX 30 MIN: CPT | Mod: GO

## 2020-08-26 PROCEDURE — 99222 1ST HOSP IP/OBS MODERATE 55: CPT | Performed by: INTERNAL MEDICINE

## 2020-08-26 PROCEDURE — 25000131 ZZH RX MED GY IP 250 OP 636 PS 637: Performed by: NURSE PRACTITIONER

## 2020-08-26 PROCEDURE — 25000131 ZZH RX MED GY IP 250 OP 636 PS 637: Performed by: STUDENT IN AN ORGANIZED HEALTH CARE EDUCATION/TRAINING PROGRAM

## 2020-08-26 PROCEDURE — 36415 COLL VENOUS BLD VENIPUNCTURE: CPT | Performed by: STUDENT IN AN ORGANIZED HEALTH CARE EDUCATION/TRAINING PROGRAM

## 2020-08-26 PROCEDURE — 99232 SBSQ HOSP IP/OBS MODERATE 35: CPT | Performed by: NURSE PRACTITIONER

## 2020-08-26 PROCEDURE — 12000012 ZZH R&B MS OVERFLOW UMMC

## 2020-08-26 PROCEDURE — 80048 BASIC METABOLIC PNL TOTAL CA: CPT | Performed by: STUDENT IN AN ORGANIZED HEALTH CARE EDUCATION/TRAINING PROGRAM

## 2020-08-26 PROCEDURE — 82955 ASSAY OF G6PD ENZYME: CPT | Performed by: NURSE PRACTITIONER

## 2020-08-26 PROCEDURE — 25000132 ZZH RX MED GY IP 250 OP 250 PS 637: Performed by: NURSE PRACTITIONER

## 2020-08-26 PROCEDURE — 25000128 H RX IP 250 OP 636: Performed by: STUDENT IN AN ORGANIZED HEALTH CARE EDUCATION/TRAINING PROGRAM

## 2020-08-26 PROCEDURE — 85027 COMPLETE CBC AUTOMATED: CPT | Performed by: STUDENT IN AN ORGANIZED HEALTH CARE EDUCATION/TRAINING PROGRAM

## 2020-08-26 PROCEDURE — 36415 COLL VENOUS BLD VENIPUNCTURE: CPT | Performed by: NURSE PRACTITIONER

## 2020-08-26 PROCEDURE — 80197 ASSAY OF TACROLIMUS: CPT | Performed by: NURSE PRACTITIONER

## 2020-08-26 PROCEDURE — 25000132 ZZH RX MED GY IP 250 OP 250 PS 637: Performed by: STUDENT IN AN ORGANIZED HEALTH CARE EDUCATION/TRAINING PROGRAM

## 2020-08-26 RX ORDER — FUROSEMIDE 40 MG
40 TABLET ORAL
Status: DISCONTINUED | OUTPATIENT
Start: 2020-08-26 | End: 2020-08-28 | Stop reason: HOSPADM

## 2020-08-26 RX ORDER — TACROLIMUS 5 MG/1
5 CAPSULE ORAL
Status: DISCONTINUED | OUTPATIENT
Start: 2020-08-26 | End: 2020-08-28 | Stop reason: HOSPADM

## 2020-08-26 RX ORDER — CLOTRIMAZOLE 10 MG/1
1 LOZENGE ORAL 4 TIMES DAILY
Status: DISCONTINUED | OUTPATIENT
Start: 2020-08-26 | End: 2020-08-28 | Stop reason: HOSPADM

## 2020-08-26 RX ORDER — TACROLIMUS 5 MG/1
5 CAPSULE ORAL EVERY MORNING
Status: DISCONTINUED | OUTPATIENT
Start: 2020-08-27 | End: 2020-08-28 | Stop reason: HOSPADM

## 2020-08-26 RX ADMIN — MYCOPHENOLATE MOFETIL 1500 MG: 250 CAPSULE ORAL at 08:33

## 2020-08-26 RX ADMIN — INSULIN ASPART 14 UNITS: 100 INJECTION, SOLUTION INTRAVENOUS; SUBCUTANEOUS at 10:26

## 2020-08-26 RX ADMIN — FONDAPARINUX SODIUM 7.5 MG: 7.5 INJECTION, SOLUTION SUBCUTANEOUS at 08:37

## 2020-08-26 RX ADMIN — PANTOPRAZOLE SODIUM 40 MG: 40 TABLET, DELAYED RELEASE ORAL at 08:33

## 2020-08-26 RX ADMIN — NYSTATIN 1000000 UNITS: 100000 SUSPENSION ORAL at 12:49

## 2020-08-26 RX ADMIN — HYDRALAZINE HYDROCHLORIDE 75 MG: 25 TABLET ORAL at 12:49

## 2020-08-26 RX ADMIN — SULFAMETHOXAZOLE AND TRIMETHOPRIM 1 TABLET: 400; 80 TABLET ORAL at 08:33

## 2020-08-26 RX ADMIN — INSULIN ASPART 13 UNITS: 100 INJECTION, SOLUTION INTRAVENOUS; SUBCUTANEOUS at 13:30

## 2020-08-26 RX ADMIN — PREDNISONE 10 MG: 10 TABLET ORAL at 17:42

## 2020-08-26 RX ADMIN — CLOTRIMAZOLE 1 LOZENGE: 10 LOZENGE ORAL at 16:04

## 2020-08-26 RX ADMIN — PREDNISONE 15 MG: 5 TABLET ORAL at 08:33

## 2020-08-26 RX ADMIN — MYCOPHENOLATE MOFETIL 1500 MG: 250 CAPSULE ORAL at 19:36

## 2020-08-26 RX ADMIN — CLOTRIMAZOLE 1 LOZENGE: 10 LOZENGE ORAL at 19:36

## 2020-08-26 RX ADMIN — TAMSULOSIN HYDROCHLORIDE 0.4 MG: 0.4 CAPSULE ORAL at 08:33

## 2020-08-26 RX ADMIN — HYDRALAZINE HYDROCHLORIDE 75 MG: 25 TABLET ORAL at 21:03

## 2020-08-26 RX ADMIN — FUROSEMIDE 40 MG: 40 TABLET ORAL at 16:04

## 2020-08-26 RX ADMIN — Medication 1 TABLET: at 17:43

## 2020-08-26 RX ADMIN — FUROSEMIDE 40 MG: 40 TABLET ORAL at 08:33

## 2020-08-26 RX ADMIN — TACROLIMUS 7 MG: 5 CAPSULE ORAL at 08:33

## 2020-08-26 RX ADMIN — NYSTATIN 1000000 UNITS: 100000 SUSPENSION ORAL at 08:36

## 2020-08-26 RX ADMIN — Medication 1 TABLET: at 08:33

## 2020-08-26 RX ADMIN — TACROLIMUS 5 MG: 5 CAPSULE ORAL at 17:43

## 2020-08-26 RX ADMIN — INSULIN ASPART 8 UNITS: 100 INJECTION, SOLUTION INTRAVENOUS; SUBCUTANEOUS at 18:30

## 2020-08-26 RX ADMIN — ROSUVASTATIN CALCIUM 10 MG: 10 TABLET, FILM COATED ORAL at 08:33

## 2020-08-26 RX ADMIN — HYDRALAZINE HYDROCHLORIDE 75 MG: 25 TABLET ORAL at 05:22

## 2020-08-26 ASSESSMENT — ACTIVITIES OF DAILY LIVING (ADL)
ADLS_ACUITY_SCORE: 12

## 2020-08-26 NOTE — PROGRESS NOTES
Diabetes Consult Daily  Progress Note          Assessment/Plan:   Lucian Henderson Sr is a 64 year old male with uncontrolled type 2 diabetes, hypertension, CKD stage 3, hx of RIJ clot and remains on fondaparinux due to HIT hx, ICM s/p OHT 7/19/2020, admitted late on 8/24 for hyperglycemia.  Now experiencing hugely improved glucose control (and hypoglycemia) with lower insulin doses and without oral antihyperglycemics that may be explained by resolution of glucose toxicity, change in administration technique (less likely), and change in nutritional intake while hospitalized.     Plan     - NPH reduced from 50 to 35 units today.  Planning none this evening (received 10 units last night)  - continue aspart 1 unit per 5 grams carbohydrate with meals and snacks  - continue aspart correction to custom 2 per 30 mg/dL glucose qA, HS 0200  - dietician counseling on carbohydrate intake for home tomorrow afternoon  - diabetes education consult, technique assessment  - glimepiride and sitagliptin remain on hold (would need sitagliptin prescribed on discharge)  - plan for new meter and supplies on discharge if pt's wife unable to satisfactorily trouble-shoot with meter company  - expect will need outpatient provider assist to move CGMS prescription forward-- sent staff message  -  outpatient follow up-- Marisabel Prieto PA-C next Tuesday 9/1           Plan discussed with patient x 2, wife, bedside RN, CDE, dietician, outpatient provider and primary team.           Interval History:     The last 24 hours progress and nursing notes reviewed.    We looked at videos of insulin pen needles together to help figure out what went wrong at home.  Insulin pen needles are designed to be universal.  Autocover needles cannot be used more than once, but typical outpatient needles can be.  Discharged with a handful of autocover needles on 8/3 (provided by nursing, did not get any from pharmacy-- review of chart  shows none prescribed).  These fit the Novolog pen.  The needles they had at home from before transplant (pt was on Lantus then) did not fit the Novolog pen for some reason.  When they ran out of autocover needles they called/paged asking for help, but never got a call back   Lucian WAS getting his NPH insulin the whole time he was home.  And the glucose was still high.  AND glucose did not improve when they got the needles last Friday that fit the Novolog pen.      Is there a HUGE difference in carbohydrate intake between home and hospital that would explain the change in glucose control?  There might be.  Lucian has always maintained that he watches his carbs.  He had education and detailed diet history w/ RD last admit.  This afternoon, Shivani says Lucian is eating multiple snacks and they contain carbs.  This seems to be a point of disagreement between them.  Asked to compare his intake yesterday in hospital to his home intake, Lucian says he ate about half what he would eat at home.    Discussed finding new carb intake goal and adjusted medications to match.  Warned about weight gain when BGs improve.  Historically, Lucian is very keen to keep his glucose closer to 100 and worries when it is high.      Recent Labs   Lab 08/26/20  1248 08/26/20  1142 08/26/20  0904 08/26/20  0850 08/26/20  0838 08/26/20  0455 08/26/20  0320  08/25/20  0445  08/24/20  2026   GLC  --   --   --   --   --  91  --   --  231*  --  269*   * 229* 123* 62* 61*  --  98   < >  --    < >  --     < > = values in this interval not displayed.           Nutrition:     Orders Placed This Encounter      Combination Diet 3214-5905 Calories: High Consistent CHO (4-7 CHO units/meal); 2 gm NA Diet    Supplements: no        PTA Regimen:   5x /day BG monitoring frequency  glimepride 6 mg daily   sitagliptin 100 mg daily (not renally adjusted, though note   NPH 50 units + 40 units, give with prednisone (15 mg + 10 mg)  Lispro correction scale  Do  Not give Correction Insulin if Pre-Meal BG less than 140    -169 give 2 units.    -199 give 4 units.    -229 give 6 units.    -259 give 8 units.    -289 give 10 units.    -319 give 12 units.    -349 give 14 units.    BG >/= 350 give 16 units.                Review of Systems:   See interval hx          Medications:   Prednisone 15 + 10 mg  Tacrolimus dose incresing       Physical Exam:     Gen: lying in bed Alert, in NAD   HEENT: NC/AT, oral MMM, hearing intact to conversational volume  Resp: Unlabored  Ext: bilateral LE edema, wraps in place now  Neuro: oriented x3, communicating clearly  Psych: calm, even mood.  Irritated discussing food limits  /59 (BP Location: Left arm)   Pulse 93   Temp 97.9  F (36.6  C) (Oral)   Resp 18   Wt 78.1 kg (172 lb 2.9 oz)   SpO2 97%   BMI 28.65 kg/m               Data:     Lab Results   Component Value Date    A1C 8.2 07/03/2020    A1C 7.9 07/08/2019    A1C 8.5 03/11/2019    A1C 7.6 10/16/2018    A1C 9.8 09/06/2017              CBC RESULTS:   Recent Labs   Lab Test 08/26/20 0455   WBC 4.3   RBC 2.52*   HGB 7.8*   HCT 24.7*   MCV 98   MCH 31.0   MCHC 31.6   RDW 21.0*        Recent Labs   Lab Test 08/26/20  0455 08/25/20  0445    133   POTASSIUM 4.7 4.9   CHLORIDE 104 102   CO2 23 24   ANIONGAP 7 7   GLC 91 231*   BUN 72* 64*   CR 1.77* 1.97*   BRYANNA 8.2* 8.1*     Liver Function Studies -   Recent Labs   Lab Test 08/24/20 2026   PROTTOTAL 5.7*   ALBUMIN 2.7*   BILITOTAL 0.5   ALKPHOS 124   AST 12   ALT 28     Lab Results   Component Value Date    INR 1.11 08/25/2020    INR 1.16 08/17/2020    INR 1.35 07/20/2020    INR 1.35 07/20/2020     I spent a total of 70 minutes bedside and on the inpatient unit managing the glycemic care of Lucian Henderson Sr.. Over 50% of my time on the unit was spent counseling the patient and wife and/or coordinating care regarding acute BG management, reconciling hospital needs to  home needs.  See note for details.      Liyah Nelson APRN [id number]  Diabetes Management job code [id number]

## 2020-08-26 NOTE — PROGRESS NOTES
"   08/26/20 1413   Rehab Discipline   Discipline OT   Type of Visit   Type of visit Initial Edema Evaluation       present No   Language Lao   General Information   Start of care 08/26/20   Referring physician Jyotsna Andino, ELIJAH CNP   Orders Evaluate and treat as indicated   Order date 08/25/20   Medical diagnosis \"Lucian Henderson Sr. is a 64 year old male with history of ICM and HFrEF s/p OHT 7/19/2020, DMII, CKD stage 3, RUE DVT c/b HIT, and HTN who presents with hyperglycemia and acute on chronic anemia.\"   Onset of illness / date of surgery 08/24/20   Affected body parts LLE;RLE   Edema etiology   (Low Albumin)   Pertinent history of current problem (PT: include personal factors and/or comorbidities that impact the POC; OT: include additional occupational profile info) \"Lucian Henderson Sr. is a 64 year old male with history of ICM and HFrEF s/p OHT 7/19/2020, DMII, CKD stage 3, RUE DVT c/b HIT, and HTN who presents with hyperglycemia and acute on chronic anemia.\"   Surgical / medical history reviewed Yes   Prior level of functional mobility Independent   Community support Family / friend caregiver   Living environment [facility]   Fall Risk Screen   Fall screen completed by OT   Have you fallen 2 or more times in the past year? No   Have you fallen and had an injury in the past year? No   Patient / Family Goals   Patient / family goals statement To go home today   Pain   Patient currently in pain No   Cognitive Status   Orientation Orientation to person, place and time   Edema Exam / Assessment   Skin condition Pitting   Skin condition comments 3+ pitting from MTP to knee creases, large area of purple bruising over right medial lower extremity   Range of Motion   ROM No deficits were identified   Strength   Strength No deficits were identified   Activities of Daily Living   Activities of Daily Living Independent   Bed Mobility   Bed mobility Independent "   Transfers   Transfers Independent   Gait / Locomotion   Gait / Locomotion Independent   Planned Edema Interventions   Planned edema interventions Gradient compression bandaging;Exercises;Precautions to prevent infection / exacerbation;Education;Fit for compression garment   Clinical Impression   Criteria for skilled therapeutic intervention met Yes   Therapy diagnosis Patient presented to OT with decreased independence in functional abilities and comfort due to medical edema.   Influenced by the following impairments / conditions Edema   Assessment of Occupational Performance 1-3 Performance Deficits   Identified Performance Deficits Ambulation, home management   Clinical Decision Making (Complexity) Low complexity   Treatment Frequency 5x/week   Treatment duration 1 week   Patient / family and/or staff in agreement with plan of care Yes   Risks and benefits of therapy have been explained Yes   Goals   Edema Eval Goals (IP) See plan of care for patient goals   Total Evaluation Time   OT Eval, Low Complexity Minutes (06927) 5

## 2020-08-26 NOTE — PLAN OF CARE
Care Provided: [id number]    Temp: 97.9  F (36.6  C) Temp src: Oral BP: 117/59 Pulse: 93 Resp: 18 SpO2: 97 % O2 Device: None (Room air)    Neuro: A&Ox4. Able to make needs known.  Cardiac: SR 90s, intermittent PACs. Per tele pt had 14beat Afib in AM, pt asymptomatic and noted on tele trending. BP Stable. Denies chest pain.   Respiratory: Sating >95% on RA. Lungs clear, denies SOB.   GI/: Adequate urine output. BM X1.   Diet/appetite: Tolerating consistent carb diet. Eating well.   Activity: Up ad fidelia in room. SBA.   Pain: Denies pain, no PRNs given.   Skin: Midline chest incision CDI/LAURENCE. BLE +3 edema, lymph wraps applied. No new deficits noted.    Labs: BG 61 in AM prior to breakfast; required high CHO gms in order to increase. Pt carb covered for PO intake. BG 92 prior to dinner. 35units NPH given on shift.     LDAs:    - PIVx1    Plan: Monitor BG and continue with home management education. Notify primary team with any changes.      Problem: Glycemic Control Impaired  Goal: Blood Glucose Level Within Desired Range  8/26/2020 1808 by Kathleen Harris, [facility]  Outcome: No Change     Recent Labs   Lab 08/26/20  1744 08/26/20  1248 08/26/20  1142 08/26/20  0904 08/26/20  0850 08/26/20  0838 08/26/20  0455  08/25/20  0445  08/24/20 2026   GLC  --   --   --   --   --   --  91  --  231*  --  269*   BGM 92 205* 229* 123* 62* 61*  --    < >  --    < >  --     < > = values in this interval not displayed.

## 2020-08-26 NOTE — PLAN OF CARE
Neuro: A&Ox4. Primary language is British.   Cardiac: SR. VSS.       Respiratory: Sating >98% on RA.  GI/: Adequate urine output via urinal. BM 8/25  Diet/appetite: Tolerating consistent carb/heart healthy diet with 2L FR.  Activity:  Up independently in room.   Pain: Denies.   Skin: No new deficits noted. Sternal incisions w/ scabbing.   LDA's: L PIV SL.      Plan: Close monitoring of BGs today to make necessary adjustments to insulin regimen. BG was below 100 overnight and given 1 apple juice and crackers and peanut butter. Continue with POC. Notify primary team with changes

## 2020-08-26 NOTE — TELEPHONE ENCOUNTER
Called patient to schedule followup from his 8/25 appointment.    Patient's wife stated he is hospitalized and would like someone to call him back after 1:00 p.m.on Thursday, 8/27.    Sending to clinic coordinator shakira.    Margie Lockhart   (2) assistive person

## 2020-08-26 NOTE — CONSULTS
"Diabetes Education  Received consult request to see this 64 year old male and his wife Shivani; reason for consult is glucometer and Humalog/NovoLog trouble-shooting.    Patient with history of type 2 diabetes; he was hospitalized from 7/3/-8/3/2020 due to a heart transplantation.  He was discharged home on NPH insulin and a correction scale of rapid-acting insulin; prior to his transplant, he had been taking insulin glargine (Lantus).  He is now admitted due to hyperglycemia.    Per review of chart notes, there was a virtual visit with Marisabel Prieto PA-C, in endocrine clinic on 8/18/2020.  His glucoses were elevated, and he states the needles did not fit on his insulin pen so he was unable to take his insulin.    Checked in with patient today; wife was not present.  Offered  services, but patient declined need.  He stated he is feeling better today.  He states he did not have insulin pen needles at home, but does now.  He states his home blood glucose monitor does not always work.  It is not in his room, so could not trouble-shoot.    Later called wife, with assistance of . Wife states when he was discharged from hospital earlier this month, they were give a few pen needles which she describes as having a \"bubble\" at the end; suspect they were given the safety pen needles which are used by hospital nursing.  States they now have the correct insulin pen needles, and states they fit on both pen devices.    Asked Shivani the brand name of the blood glucose monitor; she located it and stated the name is Accu-chek Alexandra.  Shivani states they have plenty of test strips for the meter.  States sometimes the meter gonzalez off; states they have tried replacing the batteries but still doesn't work.  Suggested she call the 1-800 number on back of the meter, and that there is the option to get assistance in Romansh.  She states she will do this and expressed thank you for the assistance.    Shivani also " stated that they would like to get the Freestyle Navjot; informed her that Lucian's insurance requires this be obtained from [facility], not a pharmacy.  Informed Shivani that the diabetes team will communicate this information to Marisabel Prieto PA-C in endocrine clinic in order to get prescription sent to [facility].    Octavia An MS, APRN, CNS, CDE, CDTC  [phone]

## 2020-08-26 NOTE — PROGRESS NOTES
[facility]   Cardiology II Service / Advanced Heart Failure  Daily Progress Note      Patient: Lucian Henderson Sr.  MRN: [id number]  Admission Date: 8/24/2020  Hospital Day # 2    Assessment and Plan: Lucian Henderson Sr. is a 64 year old male with history of ICM and HFrEF s/p OHT 7/19/2020, DMII, CKD stage 3, RUE DVT c/b HIT, and HTN who presents with hyperglycemia and acute on chronic anemia.     Today's Plan:  -heme consulted for elevated LDH and anemia  -lasix 40 mg bid PO  -endocrine to manage insulin   -f/u PM tacrolimus level  -f/u g6pd      #Steroid-induced hyperglycemia, symptomatic   #T2DM   Increased BS since transplant and prednisone. Controlled during index admission. Recently increase by NPH as outpatient. No evidence of DKA or HHS. No evidence of infection or acute illness driving hyperglycemia. Negative ketones, UA with glucose.   - finger sticks q4h, hypoglycemic protocol   - Endocrine following   - NPH 50 U in AM and 40 U in PM    - carb counting Novolog  - holding pta januvia and amaryl given DAYA     #Status post heart transplantation on 7/19/20 due to ICM   #Primary graft dysfunction, resolved  Postoperative course c/b primary graft dysfunction on POD1, EF 20-25% and severe RV dysfunction felt secondary to prolonged ischemic time. Graft function returned to normal on POD 6. IV lasix given on admission.     Graft Function:   --Volume: improved significant LE edema/third spacing, lasix 40 mg bid PO started  --BP: 100s-120s/60s-70s on hydralazine 75 mg tid  --HR: 90s off terbutaline    Immunosuppression:   --prednisone per taper with negative bx, currently 15 mg in AM 10 mg in PM  --Cellcept 1500 mg BID  --tacrolimus trough 6.7 yesterday - resulted late, increased to 7 mg bid (goal 10-12, may need to reassess goal), may need to trial dilt or clotrimazole to help boost levels, daily levels     Serostatus: CMV: D+/R+, EBV: D+/R+, Toxo: D+/R-    Prophylaxis:   --CAV: no  aspirin on fondaparinux, continue rosuvastatin 10 mg daily  --Thrush: Nystatin   --PCP: Bactrim single strength daily, g6pd pending, may switch to dapsone  --GI: Protonix   --Osteoporosis: calcium/vitamin D   --CMV: renally dosed Valcyte 450 mg EOD x 3 mos  --Toxo: Bactrim lifelong given D+     Routine Surveillance:   --next biopsy due 8/31     #DAYA on CKD stage III, improving  Baseline Cr 1.5-1.7. On admission, Cr 2. Likely cardiorenal, tac level subtherapeutic. UA bland.   - diuresis as above   - f/u tacrolimus level today  - daily BMP     #Acute on chronic normocytic anemia likely 2/2 marrow suppression  #Elevated LDH  Recent Hgb 6.9-7.7. He has had leukopenia and anemia due to bone marrow suppression due to Cellcept. Denies hematemesis, hematochezia, or melena to suggest blood loss although his he is anticoagulated and at increased risk. Suspect anemia of chronic disease and marrow suppression most likely etiology. He is slightly volume overloaded on exam which may be contributing.  - iron studies, folate, vitB12 wnl  - peripheral smear 8/24 shows marked slightly macrocytic anemia with persevered erythrocyte regeneration, no definitive evidence of hemolysis  - haptoblogin and bili wnl, however LDH elevated 433, will consult heme for recs  - q12hr CBC, transfuse <7  - defer Cellcept decrease given low tacro level     #Bilateral LE edema/third spacing  #Hypoalbuminemia  Present since transplant despite near normal filling pressures. Likely exacerbated by amlodipine. Albumin 2.7  -lymphedema consulted  -gentle diuresis  -discontinued amlodipine   -nutritional consult    #Hypervolemic hyponatremia, resolved  Na 132 on admission, now normal  -diuresis as above      #Hypertension   -discontinue amlodipine given LE edema     #RIJ and RUE DVT, provoked   #+HIT  US 7/22/20 nonocclusive thrombus in the right internal jugular vein along the intravenous catheter, nonocclusive thrombus along the right PICC line in the right  axillary vein demonstrated, and occlusive thrombus in the superficial right cephalic vein demonstrated as above. +HIT and started on fondaparinux.   -continue Fondaparinux at least 3 months then reassess, needs outpatient heme followup     #Urinary retention   -continue PTA tamsulosin     #Donor Streptococcus salivarius bacteremia  Transplant ID consulted post-transplant, teated with ceftriaxone.      Access: PIV   Diet/IVF: cardiac diet, carb consistent   DVT ppx: PTA fondaparinux   Disposition/Admission Status: Cards 2   CODE: Full code     Geovanna Andino DNP, NP-C  Advanced Heart Failure/Cardiology II Service  Pager [phone] [facility] [id number]    ================================================================    Subjective/24-Hr Events:   Last 24 hr care team notes reviewed. No overnight events. Patient reports feeling fine. BS this 62 and a little symptomatic of this. LE edema improved. Not wearing compression stockings. Denies orthopnea, PND. Continues to deny any bleeding.    ROS:  4 point ROS including respiratory, CV, GI and  (other than that noted in the HPI) is negative.     Medications: Reviewed in EPIC.     Physical Exam:   /59   Pulse 87   Temp 98.2  F (36.8  C) (Oral)   Resp 18   Wt 78.1 kg (172 lb 2.9 oz)   SpO2 99%   BMI 28.65 kg/m      GENERAL: Appears comfortable, in no distress.  HEENT: Eye symmetrical, no discharge or icterus bilaterally. Mucous membranes moist and without lesions.  NECK: Supple, JVD justabove clavicle at 90 degrees.   CV: Tachycardic and regular, +S1S2, no murmur, rub, or gallop.   RESPIRATORY: Respirations regular, even, and unlabored. Lungs CTA throughout.    GI: Soft, obese and non distended with normoactive bowel sounds present in all quadrants. No tenderness, rebound, guarding.   EXTREMITIES: 2-3+ BLE peripheral edema. 2+ bilateral pedal pulses.   NEUROLOGIC: Alert and oriented x 3. No focal deficits.   MUSCULOSKELETAL: No joint swelling or tenderness.   SKIN: No  jaundice. No rashes or lesions.     Labs:  CMP  Recent Labs   Lab 08/26/20 0455 08/25/20 0445 08/24/20 2026    133 132*   POTASSIUM 4.7 4.9 5.0   CHLORIDE 104 102 101   CO2 23 24 23   ANIONGAP 7 7 8   GLC 91 231* 269*   BUN 72* 64* 67*   CR 1.77* 1.97* 1.97*   GFRESTIMATED 39* 34* 34*   GFRESTBLACK 45* 40* 40*   BRYANNA 8.2* 8.1* 8.2*   MAG 1.8 1.8 1.9   PROTTOTAL  --   --  5.7*   ALBUMIN  --   --  2.7*   BILITOTAL  --   --  0.5   ALKPHOS  --   --  124   AST  --   --  12   ALT  --   --  28       CBC  Recent Labs   Lab 08/26/20 0455 08/25/20  1705 08/25/20 0445 08/24/20 2026   WBC 4.3 4.5 4.3 4.9   RBC 2.52* 2.74* 2.10* 2.17*   HGB 7.8* 8.5* 6.7* 6.8*   HCT 24.7* 28.6* 21.3* 21.9*   MCV 98 104* 101* 101*   MCH 31.0 31.0 31.9 31.3   MCHC 31.6 29.7* 31.5 31.1*   RDW 21.0* 21.1* 21.2* 21.2*    180 181 193       INR  Recent Labs   Lab 08/25/20 0445   INR 1.11       Time/Communication  I personally spent a total of 25 minutes. Of that 15 minutes was counseling/coordination of patient's care. Plan of care discussed with patient. See my note above for details.    Patient discussed with Dr. Sheridan.

## 2020-08-26 NOTE — PLAN OF CARE
"OT/Edema 6B: Initial assessment and treatment session completed this afternoon. Patient presented with 3+ pitting edema from MTP to knee creases likely due to low Albumin levels following OHT on 7/19/20. Appropriate to initiate gradient compression bandaging for fluid movement and wraps donned using \"Quick Wrap Technique\" which may be worn x24 hours. Please remove wraps if increased pain, numbness/tingling or soiling occurs.  "

## 2020-08-27 ENCOUNTER — APPOINTMENT (OUTPATIENT)
Dept: OCCUPATIONAL THERAPY | Facility: CLINIC | Age: 65
DRG: 638 | End: 2020-08-27
Attending: INTERNAL MEDICINE
Payer: COMMERCIAL

## 2020-08-27 ENCOUNTER — APPOINTMENT (OUTPATIENT)
Dept: INTERPRETER SERVICES | Facility: CLINIC | Age: 65
End: 2020-08-27
Payer: COMMERCIAL

## 2020-08-27 ENCOUNTER — PRE VISIT (OUTPATIENT)
Dept: TRANSPLANT | Facility: CLINIC | Age: 65
End: 2020-08-27

## 2020-08-27 ENCOUNTER — TELEPHONE (OUTPATIENT)
Dept: ENDOCRINOLOGY | Facility: CLINIC | Age: 65
End: 2020-08-27

## 2020-08-27 DIAGNOSIS — E11.65 TYPE 2 DIABETES MELLITUS WITH HYPERGLYCEMIA, WITH LONG-TERM CURRENT USE OF INSULIN (H): Primary | ICD-10-CM

## 2020-08-27 DIAGNOSIS — Z94.1 HEART REPLACED BY TRANSPLANT (H): Primary | ICD-10-CM

## 2020-08-27 DIAGNOSIS — Z79.4 TYPE 2 DIABETES MELLITUS WITH HYPERGLYCEMIA, WITH LONG-TERM CURRENT USE OF INSULIN (H): Primary | ICD-10-CM

## 2020-08-27 LAB
ANION GAP SERPL CALCULATED.3IONS-SCNC: 8 MMOL/L (ref 3–14)
BUN SERPL-MCNC: 68 MG/DL (ref 7–30)
CALCIUM SERPL-MCNC: 8.3 MG/DL (ref 8.5–10.1)
CHLORIDE SERPL-SCNC: 102 MMOL/L (ref 94–109)
CO2 SERPL-SCNC: 25 MMOL/L (ref 20–32)
CREAT SERPL-MCNC: 1.86 MG/DL (ref 0.66–1.25)
ERYTHROCYTE [DISTWIDTH] IN BLOOD BY AUTOMATED COUNT: 20.9 % (ref 10–15)
GFR SERPL CREATININE-BSD FRML MDRD: 37 ML/MIN/{1.73_M2}
GLUCOSE BLDC GLUCOMTR-MCNC: 132 MG/DL (ref 70–99)
GLUCOSE BLDC GLUCOMTR-MCNC: 149 MG/DL (ref 70–99)
GLUCOSE BLDC GLUCOMTR-MCNC: 152 MG/DL (ref 70–99)
GLUCOSE BLDC GLUCOMTR-MCNC: 191 MG/DL (ref 70–99)
GLUCOSE BLDC GLUCOMTR-MCNC: 194 MG/DL (ref 70–99)
GLUCOSE BLDC GLUCOMTR-MCNC: 197 MG/DL (ref 70–99)
GLUCOSE SERPL-MCNC: 145 MG/DL (ref 70–99)
HCT VFR BLD AUTO: 26.1 % (ref 40–53)
HGB BLD-MCNC: 8 G/DL (ref 13.3–17.7)
MAGNESIUM SERPL-MCNC: 1.7 MG/DL (ref 1.6–2.3)
MCH RBC QN AUTO: 30.4 PG (ref 26.5–33)
MCHC RBC AUTO-ENTMCNC: 30.7 G/DL (ref 31.5–36.5)
MCV RBC AUTO: 99 FL (ref 78–100)
PLATELET # BLD AUTO: 189 10E9/L (ref 150–450)
POTASSIUM SERPL-SCNC: 4.6 MMOL/L (ref 3.4–5.3)
RBC # BLD AUTO: 2.63 10E12/L (ref 4.4–5.9)
SODIUM SERPL-SCNC: 134 MMOL/L (ref 133–144)
TACROLIMUS BLD-MCNC: 24.7 UG/L (ref 5–15)
TME LAST DOSE: ABNORMAL H
WBC # BLD AUTO: 3.9 10E9/L (ref 4–11)

## 2020-08-27 PROCEDURE — 25000128 H RX IP 250 OP 636: Performed by: STUDENT IN AN ORGANIZED HEALTH CARE EDUCATION/TRAINING PROGRAM

## 2020-08-27 PROCEDURE — 80048 BASIC METABOLIC PNL TOTAL CA: CPT | Performed by: STUDENT IN AN ORGANIZED HEALTH CARE EDUCATION/TRAINING PROGRAM

## 2020-08-27 PROCEDURE — 82668 ASSAY OF ERYTHROPOIETIN: CPT | Performed by: NURSE PRACTITIONER

## 2020-08-27 PROCEDURE — 97140 MANUAL THERAPY 1/> REGIONS: CPT | Mod: GO

## 2020-08-27 PROCEDURE — 25000131 ZZH RX MED GY IP 250 OP 636 PS 637: Performed by: STUDENT IN AN ORGANIZED HEALTH CARE EDUCATION/TRAINING PROGRAM

## 2020-08-27 PROCEDURE — 83735 ASSAY OF MAGNESIUM: CPT | Performed by: STUDENT IN AN ORGANIZED HEALTH CARE EDUCATION/TRAINING PROGRAM

## 2020-08-27 PROCEDURE — 36415 COLL VENOUS BLD VENIPUNCTURE: CPT | Performed by: STUDENT IN AN ORGANIZED HEALTH CARE EDUCATION/TRAINING PROGRAM

## 2020-08-27 PROCEDURE — 25000132 ZZH RX MED GY IP 250 OP 250 PS 637: Performed by: NURSE PRACTITIONER

## 2020-08-27 PROCEDURE — 36415 COLL VENOUS BLD VENIPUNCTURE: CPT | Performed by: NURSE PRACTITIONER

## 2020-08-27 PROCEDURE — 25000131 ZZH RX MED GY IP 250 OP 636 PS 637: Performed by: NURSE PRACTITIONER

## 2020-08-27 PROCEDURE — 85027 COMPLETE CBC AUTOMATED: CPT | Performed by: STUDENT IN AN ORGANIZED HEALTH CARE EDUCATION/TRAINING PROGRAM

## 2020-08-27 PROCEDURE — 80197 ASSAY OF TACROLIMUS: CPT | Performed by: NURSE PRACTITIONER

## 2020-08-27 PROCEDURE — 25000132 ZZH RX MED GY IP 250 OP 250 PS 637: Performed by: INTERNAL MEDICINE

## 2020-08-27 PROCEDURE — 99232 SBSQ HOSP IP/OBS MODERATE 35: CPT | Performed by: NURSE PRACTITIONER

## 2020-08-27 PROCEDURE — 25000132 ZZH RX MED GY IP 250 OP 250 PS 637: Performed by: STUDENT IN AN ORGANIZED HEALTH CARE EDUCATION/TRAINING PROGRAM

## 2020-08-27 PROCEDURE — 82525 ASSAY OF COPPER: CPT | Performed by: NURSE PRACTITIONER

## 2020-08-27 PROCEDURE — 12000012 ZZH R&B MS OVERFLOW UMMC

## 2020-08-27 RX ORDER — TACROLIMUS 1 MG/1
3 CAPSULE ORAL
Status: COMPLETED | OUTPATIENT
Start: 2020-08-28 | End: 2020-08-28

## 2020-08-27 RX ORDER — LIDOCAINE 40 MG/G
CREAM TOPICAL
Status: CANCELLED | OUTPATIENT
Start: 2020-08-27

## 2020-08-27 RX ADMIN — PREDNISONE 15 MG: 5 TABLET ORAL at 08:14

## 2020-08-27 RX ADMIN — CLOTRIMAZOLE 1 LOZENGE: 10 LOZENGE ORAL at 12:53

## 2020-08-27 RX ADMIN — INSULIN ASPART 4 UNITS: 100 INJECTION, SOLUTION INTRAVENOUS; SUBCUTANEOUS at 18:45

## 2020-08-27 RX ADMIN — SULFAMETHOXAZOLE AND TRIMETHOPRIM 1 TABLET: 400; 80 TABLET ORAL at 08:15

## 2020-08-27 RX ADMIN — HYDRALAZINE HYDROCHLORIDE 75 MG: 25 TABLET ORAL at 12:53

## 2020-08-27 RX ADMIN — HYDRALAZINE HYDROCHLORIDE 75 MG: 25 TABLET ORAL at 05:01

## 2020-08-27 RX ADMIN — ROSUVASTATIN CALCIUM 10 MG: 10 TABLET, FILM COATED ORAL at 08:15

## 2020-08-27 RX ADMIN — INSULIN ASPART 14 UNITS: 100 INJECTION, SOLUTION INTRAVENOUS; SUBCUTANEOUS at 12:59

## 2020-08-27 RX ADMIN — CLOTRIMAZOLE 1 LOZENGE: 10 LOZENGE ORAL at 08:18

## 2020-08-27 RX ADMIN — Medication 1 TABLET: at 08:16

## 2020-08-27 RX ADMIN — PREDNISONE 10 MG: 10 TABLET ORAL at 18:36

## 2020-08-27 RX ADMIN — CLOTRIMAZOLE 1 LOZENGE: 10 LOZENGE ORAL at 16:14

## 2020-08-27 RX ADMIN — Medication 1 TABLET: at 18:36

## 2020-08-27 RX ADMIN — TACROLIMUS 5 MG: 5 CAPSULE ORAL at 08:17

## 2020-08-27 RX ADMIN — INSULIN ASPART 11 UNITS: 100 INJECTION, SOLUTION INTRAVENOUS; SUBCUTANEOUS at 09:27

## 2020-08-27 RX ADMIN — TAMSULOSIN HYDROCHLORIDE 0.4 MG: 0.4 CAPSULE ORAL at 08:17

## 2020-08-27 RX ADMIN — MYCOPHENOLATE MOFETIL 1500 MG: 250 CAPSULE ORAL at 08:13

## 2020-08-27 RX ADMIN — PANTOPRAZOLE SODIUM 40 MG: 40 TABLET, DELAYED RELEASE ORAL at 08:16

## 2020-08-27 RX ADMIN — VALGANCICLOVIR 450 MG: 450 TABLET, FILM COATED ORAL at 08:36

## 2020-08-27 RX ADMIN — FONDAPARINUX SODIUM 7.5 MG: 7.5 INJECTION, SOLUTION SUBCUTANEOUS at 08:21

## 2020-08-27 RX ADMIN — MYCOPHENOLATE MOFETIL 1500 MG: 250 CAPSULE ORAL at 20:23

## 2020-08-27 RX ADMIN — CLOTRIMAZOLE 1 LOZENGE: 10 LOZENGE ORAL at 20:23

## 2020-08-27 RX ADMIN — INSULIN ASPART 5 UNITS: 100 INJECTION, SOLUTION INTRAVENOUS; SUBCUTANEOUS at 18:09

## 2020-08-27 RX ADMIN — HYDRALAZINE HYDROCHLORIDE 75 MG: 25 TABLET ORAL at 20:23

## 2020-08-27 ASSESSMENT — ACTIVITIES OF DAILY LIVING (ADL)
ADLS_ACUITY_SCORE: 12

## 2020-08-27 NOTE — PROGRESS NOTES
Diabetes Consult Daily  Progress Note          Assessment/Plan:   Lucian Henderson Sr is a 64 year old male with uncontrolled type 2 diabetes, hypertension, CKD stage 3, hx of RIJ clot and remains on fondaparinux due to HIT hx, ICM s/p OHT 7/19/2020, admitted late on 8/24 for hyperglycemia.  Now experiencing hugely improved glucose control (and hypoglycemia) with lower insulin doses and without oral antihyperglycemics that may be explained by resolution of glucose toxicity, change in administration technique, and change in nutritional intake while hospitalized.    Insulin need remains lower and still without oral antihyperglycemics.  Elevated creatinine and reduced carb intake are contributors.     Plan     - NPH reduced from 35 to 32 units today.  Plan for increase back to 35 units tomorrow morning.  - No NPH at night  - continue aspart 1 unit per 5 grams carbohydrate with meals and snacks--> for home, plan on 15 units per meal, w/ meal carb goal 75 grams  - continue aspart correction custom scale 2 per 30 mg/dL glucose qA, HS 0200  - dietician counseling on carbohydrate intake for home completed  - diabetes education consult/ technique assessment/meter troubleshooting completed  - glimepiride and sitagliptin remain on hold (would need sitagliptin prescribed on discharge if it is restarted)  - expect will need outpatient provider assist to move CGMS prescription forward-- sent staff message  -  outpatient follow up-- Marisabel Prieto PA-C next Tuesday 9/1.  Sent updated staff message on findings while hospitalized      On discharge, please prescribe both NPH and rapid-acting insulin.  Family has pen needles at home now.  Need to confirm they have sufficient test strips for the new meter to last until they receive additional strips from [facility] by mail.    Plan discussed with patient, wife, bedside RN, CDE, dietician, primary team.           Interval History:     The last 24 hours  progress and nursing notes reviewed.    Today, with insulin pens and needles for demonstration, Shivani explained the confusion over insulin pen needles at home-- see Octavia An's note.  The result of that conversation is a different understanding of what insulin was being used at home.  There is concern that repeated return to the question of what was happening at home is creating more confusion for Shivani and Lucian.    Will need to move forward with dosing/carb intake information from current hospitalization.  The PTA mismatch of antihyperglycemic therapy or delivery to needs may not be fully resolved.    Telephone --  Lucian and Shivani advised to expect use of ONLY insulin at home for now.  Draft plan in writing at bedside.   Discussed carbohydrate presence or absence and relative amounts of common foods.    Dietician provided further education on label reading and carbohydrate target for meals at home.    Lucian and Shivani were hopeful for discharge today.  But, tacrolimus level much higher so some unresolved issues to address, per cards.      Recent Labs   Lab 08/27/20  1154 08/27/20  0758 08/27/20  0453 08/27/20  0208 08/26/20  2105 08/26/20  1744 08/26/20  1248  08/26/20  0455  08/25/20  0445  08/24/20 2026   GLC  --   --  145*  --   --   --   --   --  91  --  231*  --  269*   * 132*  --  152* 130* 92 205*   < >  --    < >  --    < >  --     < > = values in this interval not displayed.           Nutrition:     Orders Placed This Encounter      Combination Diet 7689-2903 Calories: High Consistent CHO (4-7 CHO units/meal); 2 gm NA Diet    Supplements: no        PTA Regimen:   5x /day BG monitoring frequency  glimepride 6 mg daily   sitagliptin 100 mg daily (not renally adjusted-- GFR is lower recently)  NPH 50 units + 40 units, give with prednisone (15 mg + 10 mg)  Lispro correction scale  Do Not give Correction Insulin if Pre-Meal BG less than 140    -169 give 2 units.    -199 give 4  units.    -229 give 6 units.    -259 give 8 units.    -289 give 10 units.    -319 give 12 units.    -349 give 14 units.    BG >/= 350 give 16 units.                Review of Systems:   See interval hx          Medications:   Prednisone 15 + 10 mg  Tacrolimus dose incresing       Physical Exam:     Gen: up in window Alert, in NAD, wife at bedside  HEENT: NC/AT, oral MMM, hearing intact to conversational volume  Resp: Unlabored  Ext: bilateral LE edema, wraps  Neuro: oriented x3, communicating clearly  Psych: frustrated at times  /64 (BP Location: Left arm)   Pulse 94   Temp 97.6  F (36.4  C) (Oral)   Resp 16   Wt 76.9 kg (169 lb 8.5 oz)   SpO2 100%   BMI 28.21 kg/m               Data:     Lab Results   Component Value Date    A1C 8.2 07/03/2020    A1C 7.9 07/08/2019    A1C 8.5 03/11/2019    A1C 7.6 10/16/2018    A1C 9.8 09/06/2017            Recent Labs   Lab Test 08/27/20  0453 08/26/20  0455    134   POTASSIUM 4.6 4.7   CHLORIDE 102 104   CO2 25 23   ANIONGAP 8 7   * 91   BUN 68* 72*   CR 1.86* 1.77*   BRYANNA 8.3* 8.2*     CBC RESULTS:   Recent Labs   Lab Test 08/27/20  0453   WBC 3.9*   RBC 2.63*   HGB 8.0*   HCT 26.1*   MCV 99   MCH 30.4   MCHC 30.7*   RDW 20.9*        I spent a total of 75 minutes bedside and on the inpatient unit managing the glycemic care of Lucian Henderson Sr.. Over 50% of my time on the unit was spent counseling the patient and wife and/or coordinating care regarding acute BG management, reconciling hospital needs to home needs.  See note for details.      Liyah Damon APRN [id number]  Diabetes Management job code [id number]

## 2020-08-27 NOTE — PROGRESS NOTES
CLINICAL NUTRITION SERVICES- BRIEF NOTE     Please see full assessment note from 8/25/20    New Findings:  Collaboration with endocrinology. Patient has been hypoglycemic with lower insulin dose than home regimen.     Diet Order: High Consistent CHO and 2 g sodium     Intake during hospitalization:   Breakfast (8/27): omelet, land, strawberry yogurt, peaches, hash browns, milk = 58 g CHO   Dinner (8/26): Stir-penaloza, chicken/ veggies/brown rice, salad, jello SF, peaches = 75 g CHO  Lunch (8/26): [place] on whole whole bread, peaches, side salad, chicken noodle soup, grapes  = 75.6 g  CHO  Breakfast (8/26):  omelet with land, milk, blueberry muffin, strawberry yogurt= 68 g CHO      Interventions  Collaboration with other providers- discussed appropriate home carbohydrate goal may be 75-90 g CHO per meal. Patient previously recommended 60 g CHO. Plan for fixed dose meal insulin     Diabetes management and endocrinology meeting with patient today. Plan to attend meeting over the phone but due to using phone , unable.   Follow-up:    RD will continue to follow and collaborate with diabetes management.   Patient to ask any further nutrition-related questions before discharge.  In addition, pt may request outpatient RD appointment.    Lisa Jimenez RD, LD  6B pager: [phone]    ADDENDUM   Spoke with patient's wife seth and patient with  over the phone. Discussed carbohydrate counting. Provided handout per NCM- carbohydrate counting in [place] (copied into discharge instructions)   --Specifically discussed reading food labels and first looking at portion size, then total carbohydrates  --Estimated via serving sizes when food does not have a label   --Reinforced goal of 75 g CHO per meal

## 2020-08-27 NOTE — PROGRESS NOTES
Diabetes Education Follow-up    Met with Lucian and wife Shivani today, with assistance of a  by [employer].  Shivani had been asked to bring in diabetes supplies from home, in order to see what they were using at home.    Wife brought in 2 different insulin pen needles.  She did not bring in the blood glucose monitor and supplies.      One of the pen needles was the B-D AutoShield Duo, a safety pen needle used by hospital nursing staff when administering insulin.  The other pen needle was a home pen needle.  Shivani stated when Lucian was discharged from last admission, the staff RN gave them 4 of the B-D AutoShield Duo pen needles to use on the Humulin N Kwikpen.    They used the home pen needles on the NovoLog Flexpen.    Wife stated they could not get the B-D Autoshield Duo pen needles to work on the Humulin N Kwikpen.  With further discussion, learned that Lucian and Shivani did not realize that the home pen needles could be used on both insulin pens, their understanding was that they needed to use certain pen needles with certain insulin pens.    Demonstrated to them that the home pen needle did fit on the Humulin N Kwikpen.    Shivani states they do have a good supply of the home pen needles.    Shivani states she had been unable to yet call the toll;-free number for [employer] regarding meter issues.  This educator called [employer].  The company no longer produces the Accu-chek Alexandra meter.  They will replace his current meter with an Accu-chek Guide Me kit, along with 100 test strips and lancets.    Discussed this with Lucian and Shivani.  They are pleased with the new meter.  It uses the same FastClix lancing device and lancet drums as the Alexandra, so Lucian knows how to use.  Discussed that they can only use Guide test strips with the Guide ME meter, and can only use Smartview test strips with the Alexandra meter.  They stated understanding.    Octavia An MS, APRN, CNS, CDE, CDTC  [phone]

## 2020-08-27 NOTE — PROGRESS NOTES
Post Transplant Patient Social Work Assessment     Patient Name: Lucian Henderson Sr.  : 1955  Age: 64 year old  MRN: [id number]  Date of transplant: 2020    Patient known to me from follow up in the transplant program.  Admitted on 2020 for hyperglycemia and acute on chronic anemia. Pt is s/p heart transplant on 2020 and discharged home on 8/3/2020. Pt's primary language is Bhutanese.    Seen today, with Bhutanese telephone , to update assessment.      Presenting Information   Living Situation: Pt lives with his wife, Shivani, in a home in [place].   Functional Status: Pt has been independent with ambulation. Pt requires assistance with medication management and cooking  Cultural/Language/Spiritual Considerations: Primary language is Bhutanese     Support System  Primary Support Person Wife   Other support:  4 Adult Children: Lucian Jr (lives locally), Elisabeth ([place]), Triny ([place]) and Silvestre ([place])  Plan for support in immediate post-hospital period: Pt's wife with continue to provide 24hr supervision and assist w/ medication management.     Health Care Directive  Decision Maker: Pt   Alternate Decision Maker: Pt's wife is 1st alternative, pt's dtr, Elisabeth is 2nd alternate and sonScarlett is 3rd alternate   Health Care Directive: Copy in Chart    Mental Health/Coping:   History of Mental Health: none   History of Chemical Health: none   Current status: No concerns   Coping: Pt is frustrated with post-transplant course and that he has not been feeling as well as he anticipated post-transplant.   Services Needed/Recommended: None     Financial   Income: Pt and wife's social security   Impact of transplant on income: minimal at this point, but have not yet received hospital bills from transplant stay.   Insurance and medication coverage: Yes   Financial concerns: None-reviewed medications and they have been able to afford transplant medications with some  co-pays  Resources needed: None-did discuss PAP for transplant medications if needed in the future.     Discharge Plan   Patient and family discharge goal: Pt is eager to return home.   Barriers to discharge: Medical Readiness    Education provided by ROSIB[facility]: Social Work role inpatient setting and Donor Letter    Assessment and recommendations and plan:      Pt and wife well known to writer from recent transplant hospitalization. They report things have been going well at home. They have been communicating with their transplant coordinator around transplant medications. In discussing diabetic management, pt and wife express some frustration in being able to control blood sugars.     Pt asked questions around communicating with donor family. We spent some time discussing this and that pt should wait until he is feeling better and further along in his recovery. We talked about what to include and not include in the letter. Helped manage pt's expectations that sometimes a donor family may not respond back for various reasons. Pt and wife appreciative of conversation.     Pt eager to discharge, hopefully tomorrow, per his report. Anticipate no Social Work needs.

## 2020-08-27 NOTE — TELEPHONE ENCOUNTER
----- Message from Marisabel Prieto PA-C sent at 8/25/2020  3:57 PM CDT -----  Regarding: pls schedule with me next Tuesday sept 1 at 430 pm  Thanks  Also next available cde to learn sliding scale, managing hyperglycemia.

## 2020-08-27 NOTE — TELEPHONE ENCOUNTER
LVM with  for patient to call back to schedule appt with Bre Mi or Suyapa Dias to learn sliding scale and manage hyperglycemia.

## 2020-08-27 NOTE — PLAN OF CARE
"Edema/6B: Patient presenting with good reductions of bilateral LE's with use of gradient compression bandages (LLE reductions > RLE). Skin remains intact. Able to visualize shin bones today. Trace to 1+ pitting in proximal shins. 1-2+ pitting in distal shins/ankles. 2-3+ pitting on dorsum of feet. Re-donned GCB's from MTP's to knee creases using \"Quick Wrap\" Technique. Patient reporting comfort. Given potential discharge home in next 1-2 days, fit for Size F comperm as previously worn Size G at home now too large. Comperm left in room. Verbalizes understanding of appropriate donning/management from previous admission. Please remove if wraps causing pain, numbness/tingling or if become soiled. OK to wear x48 hours until next edema follow-up.    May benefit from OP Lymphedema to continue to progress GCB's/fit for stockings of higher compression value than Comperm. Noted active orders still in place for OP referral from previous admission.     Addendum: Paged by RN. Patient reports wrap on RLE causing pain at dorsum of ankle. Wraps doffed. Skin with increase in redness but intact. Re-donned more loosely with patient reporting much improved comfort. Denies pain in LLE wraps. Reinforcing removal if wraps causing pain/numbness this evening.           "

## 2020-08-27 NOTE — PROGRESS NOTES
[facility]   Cardiology II Service / Advanced Heart Failure  Daily Progress Note      Patient: Lucian Henderson Sr.  MRN: [id number]  Admission Date: 8/24/2020  Hospital Day # 3    Assessment and Plan: Lucian Henderson Sr. is a 64 year old male with history of ICM and HFrEF s/p OHT 7/19/2020, DMII, CKD stage 3, RUE DVT c/b HIT, and HTN who presents with hyperglycemia and acute on chronic anemia.     Today's Plan:  -hold tacrolimus tonight and tomorrow, recheck tomorrow AM  -decrease tacro goal to 8-10  -f/u g6pd, copper, epo levels  -hold lasix today  -outpatient lymphedema and HHC RN     #Steroid-induced hyperglycemia, symptomatic   #T2DM   Increased BS since transplant and prednisone. Controlled during index admission. Recently increase by NPH as outpatient. No evidence of DKA or HHS. No evidence of infection or acute illness driving hyperglycemia. Negative ketones, UA with glucose.   - finger sticks q4h, hypoglycemic protocol   - Endocrine managing   - NPH 35 U qAM   - carb counting Novolog  - holding pta januvia and amaryl given DAYA, no plans to restart this admission     #Status post heart transplantation on 7/19/20 due to ICM   #Primary graft dysfunction, resolved  Postoperative course c/b primary graft dysfunction on POD1, EF 20-25% and severe RV dysfunction felt secondary to prolonged ischemic time. Graft function returned to normal on POD 6. IV lasix given on admission.     Graft Function:   --Volume: improved significant LE edema/third spacing, hold lasix given DAYA  --BP: 110s-130s/60s on hydralazine 75 mg tid  --HR: 90s off terbutaline    Immunosuppression:   --prednisone per taper with negative bx, currently 15 mg in AM 10 mg in PM  --Cellcept 1500 mg BID  --tacrolimus trough 24 today due to switch to clotrimazole, HOLD doses today, 3 mg ordered for AM with daily troughs    Serostatus: CMV: D+/R+, EBV: D+/R+, Toxo: D+/R-    Prophylaxis:   --CAV: no aspirin on fondaparinux,  continue rosuvastatin 10 mg daily  --Thrush: Nystatin   --PCP: Bactrim single strength daily  --GI: Protonix   --Osteoporosis: calcium/vitamin D   --CMV: renally dosed Valcyte 450 mg EOD x 3 mos  --Toxo: Bactrim lifelong given D+     Routine Surveillance:   --next biopsy due 8/31     #DAYA on CKD stage III, improving  Baseline Cr 1.5-1.7. On admission, Cr 2. Likely cardiorenal, tac level subtherapeutic. UA bland. Cr up today likely 2/2 supratherapeutic tacro.   - tacrolimus as above  - daily BMP     #Acute on chronic normocytic anemia likely 2/2 marrow suppression  #Elevated LDH  Recent Hgb 6.9-7.7. He has had leukopenia and anemia due to bone marrow suppression due to Cellcept. Denies hematemesis, hematochezia, or melena to suggest blood loss although his he is anticoagulated and at increased risk. Suspect anemia of chronic disease and marrow suppression most likely etiology. He is slightly volume overloaded on exam which may be contributing.  - iron studies, folate, vitB12 wnl  - peripheral smear 8/24 shows marked slightly macrocytic anemia with persevered erythrocyte regeneration, no definitive evidence of hemolysis  - haptoblogin and bili wnl, however LDH elevated 433, low suspicion of hemolysis  - heme consulted   - copper, epo, g6pd pending   - anemia likely 2/2 CKD, recent surgery, Bactrim/Cellcept  - daily CBC, transfuse <7     #Bilateral LE edema/third spacing  #Hypoalbuminemia  Present since transplant despite near normal filling pressures. Likely exacerbated by amlodipine. Albumin 2.7  -lymphedema consulted, outpatient referral placed  -discontinued amlodipine   -nutritional consult    #Hypervolemic hyponatremia, resolved  Na 132 on admission, now normal  -daily BMP     #Hypertension   -discontinued amlodipine given LE edema     #RIJ and RUE DVT, provoked   #+HIT  US 7/22/20 nonocclusive thrombus in the right internal jugular vein along the intravenous catheter, nonocclusive thrombus along the right PICC  line in the right axillary vein demonstrated, and occlusive thrombus in the superficial right cephalic vein demonstrated as above. +HIT and started on fondaparinux.   -continue Fondaparinux at least 3 months then reassess, needs outpatient heme followup     #Urinary retention   -continue PTA tamsulosin     #Donor Streptococcus salivarius bacteremia  Transplant ID consulted post-transplant, teated with ceftriaxone.      Access: PIV   Diet/IVF: cardiac diet, carb consistent   DVT ppx: PTA fondaparinux   Disposition/Admission Status: Cards 2   CODE: Full code     Geovanna Andino DNP, NP-C  Advanced Heart Failure/Cardiology II Service  Pager [phone] [facility] [id number]    ================================================================    Subjective/24-Hr Events:   Last 24 hr care team notes reviewed. No overnight events. No complaints today. LE edema much improved in lymphedema wraps. Denies orthopnea, PND, exertional shortness of breath. No HA.     ROS:  4 point ROS including respiratory, CV, GI and  (other than that noted in the HPI) is negative.     Medications: Reviewed in EPIC.     Physical Exam:   /68 (BP Location: Left arm)   Pulse 89   Temp 98  F (36.7  C) (Oral)   Resp 18   Wt 76.9 kg (169 lb 8.5 oz)   SpO2 96%   BMI 28.21 kg/m      GENERAL: Appears comfortable, in no distress.  HEENT: Eye symmetrical, no discharge or icterus bilaterally. Mucous membranes moist and without lesions.  NECK: Supple, JVD not visible at 90 degrees.   CV: Tachycardic and regular, +S1S2, no murmur, rub, or gallop.   RESPIRATORY: Respirations regular, even, and unlabored. Lungs CTA throughout.    GI: Soft, obese and non distended with normoactive bowel sounds present in all quadrants. No tenderness, rebound, guarding.   EXTREMITIES: 1-2+ BLE peripheral edema, improved. 2+ bilateral pedal pulses.   NEUROLOGIC: Alert and oriented x 3. No focal deficits.   MUSCULOSKELETAL: No joint swelling or tenderness.   SKIN: No jaundice. No  rashes or lesions.     Labs:  CMP  Recent Labs   Lab 08/27/20  0453 08/26/20  0455 08/25/20  0445 08/24/20 2026    134 133 132*   POTASSIUM 4.6 4.7 4.9 5.0   CHLORIDE 102 104 102 101   CO2 25 23 24 23   ANIONGAP 8 7 7 8   * 91 231* 269*   BUN 68* 72* 64* 67*   CR 1.86* 1.77* 1.97* 1.97*   GFRESTIMATED 37* 39* 34* 34*   GFRESTBLACK 42* 45* 40* 40*   BRYANNA 8.3* 8.2* 8.1* 8.2*   MAG 1.7 1.8 1.8 1.9   PROTTOTAL  --   --   --  5.7*   ALBUMIN  --   --   --  2.7*   BILITOTAL  --   --   --  0.5   ALKPHOS  --   --   --  124   AST  --   --   --  12   ALT  --   --   --  28       CBC  Recent Labs   Lab 08/27/20  0453 08/26/20 0455 08/25/20  1705 08/25/20 0445   WBC 3.9* 4.3 4.5 4.3   RBC 2.63* 2.52* 2.74* 2.10*   HGB 8.0* 7.8* 8.5* 6.7*   HCT 26.1* 24.7* 28.6* 21.3*   MCV 99 98 104* 101*   MCH 30.4 31.0 31.0 31.9   MCHC 30.7* 31.6 29.7* 31.5   RDW 20.9* 21.0* 21.1* 21.2*    190 180 181       INR  Recent Labs   Lab 08/25/20 0445   INR 1.11       Time/Communication  I personally spent a total of 25 minutes. Of that 15 minutes was counseling/coordination of patient's care. Plan of care discussed with patient. See my note above for details.    Patient discussed with Dr. Sheridan.

## 2020-08-27 NOTE — DISCHARGE INSTRUCTIONS
Other medication changes:  [] decrease tacrolimus to 3 mg twice a day  [] stop nystatin swish and swallow  [] start clotrimazole lozenges, take four times per day  [] stop amlodipine  [] continue the shots of fondaparinux - DO NOT TAKE THIS on Monday morning before your biopsy       Terapia nutricional para la diabetes tipo 2    Por qué es importante contar carbohidratos?     Contar las porciones de carbohidratos puede ayudarle a controlar la glucosa (azúcar) en la sparkle para que se sienta mejor.     El equilibrio entre los carbohidratos que come y la insulina determina el nivel de glucosa que tendrá en la sparkle después de comer.     El conteo de carbohidratos también puede ayudarle a planificar kely comidas.      Qué alimentos contienen carbohidratos?   Entre los alimentos con carbohidratos se incluyen:     Panes, galletas saladas y cereales     Pastas, arroz y granos     Vegetales (verduras) con almidón, halie sandra, elote (maíz o choclo) y chícharos (guisantes o arvejas)     Frijoles (habichuelas) y legumbres     Leche, leche de soya y yogur     Frutas y jugos de frutas     Dulces halie pasteles, galletas, helados, mermeladas y jaleas     Porciones de carbohidratos   Al planificar comidas para la diabetes, un alimento con 1 porción de carbohidratos contiene cerca de 15 gramos de carbohidratos:     Verifique el tamaño de las porciones con tazas y cucharas de medir o con shen pesa de alimentos.     Shahana los Datos de Nutrición en las etiquetas de los alimentos para saber cuántos gramos de carbohidratos contienen los alimentos que come.     Los alimentos en kerry folleto muestran porciones que contienen cerca de 15 gramos de carbohidratos.Copyright Academy of Nutrition and Dietetics. This handout may be duplicated for client education. Type 2 Diabetes Nutrition Therapy (English) - Page 2     Consejos para planificar kely comidas   Un Plan de Alimentación le indica cuántas porciones de carbohidratos consumir en kely comidas  y refrigerios (snacks). Para muchos adultos es adecuado comer 3 a 5 porciones de carbohidratos en cada comida y 1 a 2 porciones de carbohidratos en cada refrigerio.     En un Plan de Alimentación diaria saludable, la mayoría de los carbohidratos provienen de:   o Al menos 6 porciones de frutas y vegetales sin almidón   o Al menos 6 porciones de granos, frijoles y vegetales con almidón, de las cuales al menos 3 porciones deben ser granos integrales   o Al menos 2 porciones de leche o productos lácteos     Revise regularmente lainez nivel de glucosa en la sparkle. [place] puede indicarle si necesita ajustar las horas a las que consume carbohidratos.     Silver alimentos que contienen fibra, halie el nandini integral, y comer muy pocos alimentos salados es rogers para lainez terri.     Coma 4 a 6 onzas de carne u otros alimentos con proteínas (halie hamburguesas de soya) cada día. Elija franco de proteínas bajas en grasa, tales halie brandi magras de res y de cerdo, angela, pescado, queso bajo en grasa o alimentos vegetarianos halie la soya.     Coma algunas grasas saludables, tales halie aceite de larsen, de canola y nueces.     Coma muy pocas grasas saturadas. Estas grasas no son saludables y se encuentran en la mantequilla, la crema y en las brandi de alto contenido graso, tales halie el tocino (tocineta) y las salchichas o chorizos.     Coma muy pocas o nada de grasas trans. Estas grasas no son saludables y se encuentran en todos los alimentos que contienen aceites  parcialmente hidrogenados  en lainez lista de ingredientes.     Consejos para leer etiquetas   En los Datos de Nutrición de las etiquetas aparece shen lista con el total de gramos de carbohidratos en 1 porción estándar. La porción estándar puede ser mayor o cinthya que 1 porción de carbohidratos. Para saber cuántas porciones de carbohidratos hay en un alimento:     Omaira isaiah el tamaño de la porción estándar de la etiqueta.     Luego verifique el total de gramos de carbohidratos.  Esta es la cantidad de carbohidratos en shen porción estándar.     Divida el total de gramos de carbohidratos por 15. Saray número equivale al número de porciones de carbohidratos en 1 porción estándar. Recuerde: 1 porción de carbohidratos equivale a 15 gramos de carbohidratos.     Nota: Puede ignorar los gramos de azúcar en los Datos de Nutrición, ya que están incluidos en el total de gramos de carbohidratos.   Copyright Academy of Nutrition and Dietetics. This handout may be duplicated for client education. Type 2 Diabetes Nutrition Therapy (Zambian) - Page 3     Listas de alimentos para el conteo de carbohidratos   1 porción = cerca de 15 gramos de carbohidratos   Granos     1 rebanada de pan (1 onza)     1 tortilla (6 pulgadas)       rosca de pan (bagel) vicky (1 onza)     2 tortillas para taco (5 pulgadas)       pan para hamburguesa o para salchicha (hot dog) (  onza)       taza de cereal listo para comer, sin endulzar       taza de cereal cocido     1 taza de sopa a base de caldo     4-6 galletitas saladas     ? taza de pasta o arroz (cocidos)       taza de frijoles, chícharos, granos de elote, camotes (batatas, boniatos), calabaza (zapallo), puré de sandra o sandra hervidas (cocidos)       papa vicky asada (3 onzas)       onza de pretzels, papitas o totopos (tortilla chips)     3 tazas de palomitas de maíz (popcorn) (ya preparadas)     Frutas     1 fruta fresca pequeña (  a 1 taza)       taza de fruta enlatada o congelada     17 uvas pequeñas (3 onzas)     1 taza de melón, bayas (moras)       taza de jugo de fruta sin endulzar     2 cucharadas de frutas secas (arándanos azules/blueberries, cerezas, arándanos rojos/ cranberrries, frutas surtidas, uvas pasas/pasitas)     Leche     1 taza de leche descremada o reducida en grasa     1 taza de leche de soya     ? taza (6 onzas) de yogur descremado con edulcorante sin azúcar     Dulces y postres     pastel tato de 2 pulgadas (sin betún/cobertura)     2 galletitas  dulces (? onza)       taza de helado o yogur congelado       taza de sorbete (sherbet) o sandor (sorbet)     1 cucharada de jarabe (sirope), mermelada, jalea, azúcar o miel     2 cucharadas de jarabe bajo en calorías   Copyright Academy of Nutrition and Dietetics. This handout may be duplicated for client education. Type 2 Diabetes Nutrition Therapy (Dutch) - Page 4   Otros alimentos     Cuente 1 taza de vegetales crudos o   taza de vegetales cocidos, sin almidón, halie porciones de alimentos con cero (0) carbohidratos o  sin restricción.  Si come 3 o más porciones en shen comida, cuéntelas halie 1 porción de carbohidratos.     Los alimentos que contienen menos de 20 calorías en cada porción también pueden contarse halie porciones con cero carbohidratos o alimentos  sin restricción.      Cuente 1 taza de guiso (estofado) u otros alimentos mezclados halie 2 porciones de carbohidratos.     Notas: Copyright Academy of Nutrition and Dietetics. This handout may be duplicated for client education. Type 2 Diabetes Nutrition Therapy (Dutch) - Page 5     [facility] Diabetes Management Team Discharge Instructions  Confirmed they do have enough supplies at home.    -  NPH 35 units in the morning     Aspart/lispro 15 units per meal (meal target 75 grams carb)    Aspart/lispro correction    -169 give 2 units.  -199 give 4 units.  -229 give 6 units.  -259 give 8 units.  -289 give 10 units.  -319 give 12 units.  -349 give 14 units.  BG >/= 350 give 16 units.    Blood Glucose Checks: three times daily before meals, and at bedtime.    Endocrinology Outpatient follow up: An appointment request was sent to the [facility] coordinator to schedule your outpatient diabetes appointment 1-2 weeks from discharge. Please call the clinic at [phone] if you do not have an appointment scheduled on discharge, or if you have non-urgent questions regarding your blood sugars or insulin.     If  you have urgent questions or concerns regarding your blood sugars or insulin, you may contact [phone] (the main hospital ). Ask to speak with the endocrinologist on call.    Your target A1c value is less than 7%. Your most recent A1c is 8.2%.     Thank you for letting the Diabetes Management Team be involved in your care!

## 2020-08-27 NOTE — PLAN OF CARE
Neuro: A&Ox4. Able to make needs known.  Cardiac: SR 80-90s.   Respiratory: Sating >95% on RA. Lungs clear, denies SOB.   GI/: Adequate urine output. BM 8/26.   Diet/appetite: Tolerating consistent carb diet. Eating well.   Activity: Up ad fidelia in room.   Pain: Denies pain, no PRNs given.   Skin: Midline chest incision w/ scabbing. BLE +3 edema, lymph wraps in place. No new deficits noted.     Labs:       LDAs:    - PIVx1     Plan: Monitor BG and continue with home management education. Notify primary team with any changes

## 2020-08-28 ENCOUNTER — TELEPHONE (OUTPATIENT)
Dept: TRANSPLANT | Facility: CLINIC | Age: 65
End: 2020-08-28

## 2020-08-28 ENCOUNTER — TELEPHONE (OUTPATIENT)
Dept: GASTROENTEROLOGY | Facility: CLINIC | Age: 65
End: 2020-08-28

## 2020-08-28 ENCOUNTER — APPOINTMENT (OUTPATIENT)
Dept: INTERPRETER SERVICES | Facility: CLINIC | Age: 65
End: 2020-08-28
Payer: COMMERCIAL

## 2020-08-28 VITALS
DIASTOLIC BLOOD PRESSURE: 65 MMHG | HEART RATE: 92 BPM | RESPIRATION RATE: 16 BRPM | OXYGEN SATURATION: 99 % | SYSTOLIC BLOOD PRESSURE: 126 MMHG | TEMPERATURE: 97.9 F | BODY MASS INDEX: 28.21 KG/M2 | WEIGHT: 169.53 LBS

## 2020-08-28 LAB
ANION GAP SERPL CALCULATED.3IONS-SCNC: 7 MMOL/L (ref 3–14)
BUN SERPL-MCNC: 75 MG/DL (ref 7–30)
CALCIUM SERPL-MCNC: 8.1 MG/DL (ref 8.5–10.1)
CHLORIDE SERPL-SCNC: 104 MMOL/L (ref 94–109)
CO2 SERPL-SCNC: 24 MMOL/L (ref 20–32)
CREAT SERPL-MCNC: 1.82 MG/DL (ref 0.66–1.25)
EPO SERPL-ACNC: 449 MU/ML (ref 4–27)
ERYTHROCYTE [DISTWIDTH] IN BLOOD BY AUTOMATED COUNT: 20.8 % (ref 10–15)
GFR SERPL CREATININE-BSD FRML MDRD: 38 ML/MIN/{1.73_M2}
GLUCOSE BLDC GLUCOMTR-MCNC: 109 MG/DL (ref 70–99)
GLUCOSE BLDC GLUCOMTR-MCNC: 155 MG/DL (ref 70–99)
GLUCOSE BLDC GLUCOMTR-MCNC: 220 MG/DL (ref 70–99)
GLUCOSE SERPL-MCNC: 145 MG/DL (ref 70–99)
HCT VFR BLD AUTO: 26.1 % (ref 40–53)
HGB BLD-MCNC: 7.9 G/DL (ref 13.3–17.7)
MAGNESIUM SERPL-MCNC: 1.8 MG/DL (ref 1.6–2.3)
MCH RBC QN AUTO: 30.4 PG (ref 26.5–33)
MCHC RBC AUTO-ENTMCNC: 30.3 G/DL (ref 31.5–36.5)
MCV RBC AUTO: 100 FL (ref 78–100)
PLATELET # BLD AUTO: 197 10E9/L (ref 150–450)
POTASSIUM SERPL-SCNC: 4.6 MMOL/L (ref 3.4–5.3)
RBC # BLD AUTO: 2.6 10E12/L (ref 4.4–5.9)
SODIUM SERPL-SCNC: 135 MMOL/L (ref 133–144)
TACROLIMUS BLD-MCNC: 8.3 UG/L (ref 5–15)
TME LAST DOSE: NORMAL H
WBC # BLD AUTO: 3.6 10E9/L (ref 4–11)

## 2020-08-28 PROCEDURE — 25000132 ZZH RX MED GY IP 250 OP 250 PS 637: Performed by: STUDENT IN AN ORGANIZED HEALTH CARE EDUCATION/TRAINING PROGRAM

## 2020-08-28 PROCEDURE — 80048 BASIC METABOLIC PNL TOTAL CA: CPT | Performed by: STUDENT IN AN ORGANIZED HEALTH CARE EDUCATION/TRAINING PROGRAM

## 2020-08-28 PROCEDURE — 99239 HOSP IP/OBS DSCHRG MGMT >30: CPT | Performed by: INTERNAL MEDICINE

## 2020-08-28 PROCEDURE — 25000132 ZZH RX MED GY IP 250 OP 250 PS 637: Performed by: NURSE PRACTITIONER

## 2020-08-28 PROCEDURE — 36415 COLL VENOUS BLD VENIPUNCTURE: CPT | Performed by: STUDENT IN AN ORGANIZED HEALTH CARE EDUCATION/TRAINING PROGRAM

## 2020-08-28 PROCEDURE — 25000131 ZZH RX MED GY IP 250 OP 636 PS 637: Performed by: STUDENT IN AN ORGANIZED HEALTH CARE EDUCATION/TRAINING PROGRAM

## 2020-08-28 PROCEDURE — 25000128 H RX IP 250 OP 636: Performed by: STUDENT IN AN ORGANIZED HEALTH CARE EDUCATION/TRAINING PROGRAM

## 2020-08-28 PROCEDURE — 83735 ASSAY OF MAGNESIUM: CPT | Performed by: STUDENT IN AN ORGANIZED HEALTH CARE EDUCATION/TRAINING PROGRAM

## 2020-08-28 PROCEDURE — 85027 COMPLETE CBC AUTOMATED: CPT | Performed by: STUDENT IN AN ORGANIZED HEALTH CARE EDUCATION/TRAINING PROGRAM

## 2020-08-28 PROCEDURE — 25000131 ZZH RX MED GY IP 250 OP 636 PS 637: Performed by: NURSE PRACTITIONER

## 2020-08-28 PROCEDURE — 80197 ASSAY OF TACROLIMUS: CPT | Performed by: NURSE PRACTITIONER

## 2020-08-28 PROCEDURE — 00000146 ZZHCL STATISTIC GLUCOSE BY METER IP

## 2020-08-28 RX ORDER — FONDAPARINUX SODIUM 7.5 MG/.6ML
7.5 INJECTION SUBCUTANEOUS EVERY 24 HOURS
Qty: 90 SYRINGE | Refills: 3 | Status: SHIPPED | OUTPATIENT
Start: 2020-08-28 | End: 2020-10-29

## 2020-08-28 RX ORDER — TACROLIMUS 1 MG/1
3 CAPSULE ORAL 2 TIMES DAILY
Qty: 330 CAPSULE | Refills: 3 | Status: SHIPPED | OUTPATIENT
Start: 2020-08-28 | End: 2020-09-18

## 2020-08-28 RX ORDER — CLOTRIMAZOLE 10 MG/1
1 LOZENGE ORAL 4 TIMES DAILY
Qty: 120 LOZENGE | Refills: 3 | Status: SHIPPED | OUTPATIENT
Start: 2020-08-28 | End: 2020-10-14

## 2020-08-28 RX ORDER — FLASH GLUCOSE SENSOR
KIT MISCELLANEOUS
Qty: 2 EACH | Refills: 11 | Status: SHIPPED | OUTPATIENT
Start: 2020-08-28 | End: 2023-01-17

## 2020-08-28 RX ORDER — VALGANCICLOVIR 450 MG/1
450 TABLET, FILM COATED ORAL
Qty: 30 TABLET | Refills: 3 | Status: ON HOLD | OUTPATIENT
Start: 2020-08-28 | End: 2020-10-05

## 2020-08-28 RX ORDER — POLYETHYLENE GLYCOL 3350 17 G/17G
17 POWDER, FOR SOLUTION ORAL DAILY PRN
Qty: 7 PACKET | Refills: 0 | Status: SHIPPED | OUTPATIENT
Start: 2020-08-28 | End: 2021-01-05

## 2020-08-28 RX ADMIN — ROSUVASTATIN CALCIUM 10 MG: 10 TABLET, FILM COATED ORAL at 07:56

## 2020-08-28 RX ADMIN — PANTOPRAZOLE SODIUM 40 MG: 40 TABLET, DELAYED RELEASE ORAL at 07:54

## 2020-08-28 RX ADMIN — INSULIN ASPART 11 UNITS: 100 INJECTION, SOLUTION INTRAVENOUS; SUBCUTANEOUS at 09:37

## 2020-08-28 RX ADMIN — PREDNISONE 15 MG: 5 TABLET ORAL at 07:55

## 2020-08-28 RX ADMIN — TAMSULOSIN HYDROCHLORIDE 0.4 MG: 0.4 CAPSULE ORAL at 08:07

## 2020-08-28 RX ADMIN — INSULIN ASPART 11 UNITS: 100 INJECTION, SOLUTION INTRAVENOUS; SUBCUTANEOUS at 13:25

## 2020-08-28 RX ADMIN — CLOTRIMAZOLE 1 LOZENGE: 10 LOZENGE ORAL at 07:55

## 2020-08-28 RX ADMIN — HYDRALAZINE HYDROCHLORIDE 75 MG: 25 TABLET ORAL at 12:04

## 2020-08-28 RX ADMIN — FONDAPARINUX SODIUM 7.5 MG: 7.5 INJECTION, SOLUTION SUBCUTANEOUS at 08:01

## 2020-08-28 RX ADMIN — TACROLIMUS 3 MG: 1 CAPSULE ORAL at 07:58

## 2020-08-28 RX ADMIN — HYDRALAZINE HYDROCHLORIDE 75 MG: 25 TABLET ORAL at 04:11

## 2020-08-28 RX ADMIN — Medication 1 TABLET: at 07:55

## 2020-08-28 RX ADMIN — CLOTRIMAZOLE 1 LOZENGE: 10 LOZENGE ORAL at 11:16

## 2020-08-28 RX ADMIN — SULFAMETHOXAZOLE AND TRIMETHOPRIM 1 TABLET: 400; 80 TABLET ORAL at 07:55

## 2020-08-28 RX ADMIN — MYCOPHENOLATE MOFETIL 1500 MG: 250 CAPSULE ORAL at 07:55

## 2020-08-28 ASSESSMENT — ACTIVITIES OF DAILY LIVING (ADL)
ADLS_ACUITY_SCORE: 12

## 2020-08-28 NOTE — TELEPHONE ENCOUNTER
Called pt to check after hospitalization for hyperglycemia. Pt states he feels well.  Discussed upcoming biopsy appt on Monday with pt.  12 drug trough for Tacrolimus reviewed.  Pt also instructed to hold dose of Fondaparinux on Monday morning.  Pt states he has the correct insulin and needles for home use.  Medication changes that were made in the hospital again reviewed with pt.  He states understanding.  Pt again given on-call coordinator number to use over the weekend if needed.  Pt instructed to call coordinator if BG falls below 70 or is higher than 250.    Pt called using .  Pt states understanding all instructions and plan of care.

## 2020-08-28 NOTE — DISCHARGE SUMMARY
[facility]   Cardiology II Service / Advanced Heart Failure  Discharge Summary     Lucian Henderson . MRN# [id number]   YOB: 1955 Age: 64 year old     DATE OF ADMISSION:  8/24/2020  DATE OF DISCHARGE: 8/28/2020  ADMITTING PROVIDER: Arvin Sheridan MD  DISCHARGE PROVIDER: Jennifer Anand MD and Geovanna Andino DNP, NP-C   PRIMARY PROVIDER:  Suyapa Hatfield    ADMIT DIAGNOSES:   1. Steroid induced hyperglycemia and type 2 DM  2. S/p orthotopic heart transplant due to ICM  3. Primary graft dysfunction, resolved  4. DAYA on CKD stage 3  5. Acute on chronic normocytic anemia  6. Hypervolemic hyponatremia  7. Hypertension  8. Provoked RIJ and RUE DVT  9. HIT on fondaparinux     DISCHARGE DIAGNOSES:   1. Steroid induced hyperglycemia and type 2 DM, uncontrolled due to error with administration  2. S/p orthotopic heart transplant due to ICM  3. Primary graft dysfunction, resolved  4. CKD stage 3  5. Acute on chronic slightly macrocytic anemia due to medications  6. Hypervolemic hyponatremia, resolved  7. Hypertension  8. Provoked RIJ and RUE DVT  9. HIT on fondaparinux   10. Lymphedema of BLE  11. Subtherapeutic tacrolimus level    Follow-up:  [] BMP, CBC, tacrolimus trough on Monday  [] next bx and RHC on Monday  [] HHC RN for assistance with med management  [] appt with endocrine on 9/1    PENDING RESULTS:   [  ] copper level  [  ] g6pd level    HPI: Please see the detailed H & P by Nichole Lares and Arvin from 8/24/2020. Briefly, Lucian Henderson is a 64 year old male with PMHx significant for ICM and HFrEF s/p OHT 7/19/2020, DMII, CKD stage 3, RUE DVT c/b HIT, and HTN who presents with hyperglycemia. He noted since discharge his blood sugars have been difficult to control. They were in the 300s but for the past few days have been in the 500s. With this he has noted feeling fatigued, dizzy, and has had increased urination. He is careful to avoid sugar and carbs and has  not changed his diet recently. He had steroid induced hyperglycemia while admitted last time and endocrine was following for this. He followed up with endocrine via virtual visit 8/18 and his NPH was increased to 50 units in AM and 40 units in evening. His prednisone is slowly being tapered. On day of admission, RN called to follow up on blood sugars and he reported AM glucose of 430 and direct admission arranged for diabetes management. On arrival here, his blood sugars are in 330s. Patient reports feeling well. Denies fever, chills, shortness of breath, cough, or chest pain. He notes his lower extremity swelling is at baseline since discharge.     HOSPITAL COURSE:   #Steroid-induced hyperglycemia, symptomatic   #T2DM   Increased BS since transplant due to prednisone. Controlled during index admission. Recently increase by NPH as outpatient. No evidence of DKA or HHS on admission. No evidence of infection or acute illness driving hyperglycemia. Negative ketones, UA with glucose. Endocrine consulted and managed. Diabetes education met w patient and his wife - appears elevated BS were due to error in insulin administration. See their note for details. BS controlled day of discharge on NPH 35 units qAM. D/c'ed on this dose and Novolog 15 units with each meal plus sliding scale. We are holding oral antiglycemics. He will f/u with endocrine in clinic on Tuesday.      #Status post heart transplantation on 7/19/20 due to ICM   #Primary graft dysfunction, resolved  Postoperative course c/b primary graft dysfunction on POD1, EF 20-25% and severe RV dysfunction felt secondary to prolonged ischemic time. Graft function returned to normal on POD 6. IV lasix given on admission.      Graft Function:   --Volume: improved significant LE edema/third spacing, diureses gentle this admission, centrally euvolemic on day of discharge, weight 169 lb, RHC on Monday  --BP: 100s-120s/60s on hydralazine 75 mg tid  --HR: 90s off  terbutaline     Immunosuppression:   --prednisone per taper with negative bx, currently 15 mg in AM 10 mg in PM  --Cellcept 1500 mg BID  --tacrolimus levels subtherapeutic since transplant, transitioned to clotrimazole to boost levels and trough 24, trough today 8.3, will discharge on 3 mg bid, level on Monday     Serostatus: CMV: D+/R+, EBV: D+/R+, Toxo: D+/R-     Prophylaxis:   --CAV: no aspirin on fondaparinux, continue rosuvastatin 10 mg daily  --Thrush: Nystatin   --PCP: Bactrim single strength daily  --GI: Protonix   --Osteoporosis: calcium/vitamin D   --CMV: renally dosed Valcyte 450 mg EOD x 3 mos  --Toxo: Bactrim lifelong given D+      Routine Surveillance:   --next biopsy due 8/31     #DAYA on CKD stage III, improving  Baseline Cr 1.5-1.7. On admission, Cr 2. Likely cardiorenal, tac level normal at that time. UA bland. Cr up likely 2/2 supratherapeutic tacrolimus level (24). RHC on Monday, holding lasix. Cr 1.8 on day of discharge.      #Acute on chronic normocytic anemia likely 2/2 marrow suppression  #Elevated LDH  Recent Hgb 6.9-7.7. He has had leukopenia and anemia due to bone marrow suppression due to Cellcept. Denies hematemesis, hematochezia, or melena to suggest blood loss although his he is anticoagulated and at increased risk.   - iron studies, folate, vitB12 wnl  - peripheral smear 8/24 shows marked slightly macrocytic anemia with persevered erythrocyte regeneration, no definitive evidence of hemolysis  - haptoblogin and bili wnl, however LDH elevated 433, low suspicion of hemolysis  - heme consulted   - epo wnl                - copper and g6pd pending                - anemia likely 2/2 CKD, recent surgery, Bactrim/Cellcept  - CBC on Monday, may consider decreased Cellcept if ongoing issue     #Bilateral LE edema/third spacing  #Hypoalbuminemia  Present since transplant despite near normal filling pressures. Likely exacerbated by amlodipine which was stopped this admission. Albumin 2.7.  Lymphedema consulted and legs wrapped, outpatient referral placed. Nutrition consulted for dietary protein education.      #Hypervolemic hyponatremia, resolved  Na 132 on admission, now normal with diuresis.      #Hypertension   Discontinued amlodipine given LE edema. BP at goal on hydralazine 75 mg tid.      #RIJ and RUE DVT, provoked   #+HIT  US 7/22/20 nonocclusive thrombus in the right internal jugular vein along the intravenous catheter, nonocclusive thrombus along the right PICC line in the right axillary vein demonstrated, and occlusive thrombus in the superficial right cephalic vein demonstrated as above. +HIT and started on fondaparinux. Continue Fondaparinux at least 3 months then reassess, needs outpatient heme follow-up - coordinator to arrange.      #Urinary retention   Continue PTA tamsulosin      #Donor Streptococcus salivarius bacteremia  Transplant ID consulted post-transplant, teated with ceftriaxone.      Geovanna Andino DNP, NP-C  8/28/2020  Pager: [phone]    PHYSICAL EXAM:  Blood pressure 126/65, pulse 92, temperature 97.9  F (36.6  C), temperature source Oral, resp. rate 16, weight 76.9 kg (169 lb 8.5 oz), SpO2 99 %.    GENERAL: Appears comfortable, in no distress.  HEENT: Eye symmetrical and without discharge or icterus bilaterally. Mucous membranes moist and without lesions.  NECK: Supple, JVD not visible at 90 degrees.   CV: Tachycardic and regular, +S1S2, no murmur, rub, or gallop.   RESPIRATORY: Respirations regular, even, and unlabored. Lungs CTA throughout.   GI: Soft, obese and non distended with normoactive bowel sounds present in all quadrants. No tenderness, rebound, guarding.   EXTREMITIES: 1-2+ peripheral edema in wraps today. 1+ bilateral pedal pulses.   NEUROLOGIC: Alert and orientated x 3. No focal deficits.   MUSCULOSKELETAL: No joint swelling or tenderness.   SKIN: No jaundice. No rashes or lesions.     LABS:   Last CBC:   Recent Labs   Lab Test 08/28/20  0432   WBC 3.6*    RBC 2.60*   HGB 7.9*   HCT 26.1*      MCH 30.4   MCHC 30.3*   RDW 20.8*          Last CMP:  Recent Labs   Lab Test 08/28/20  0432  08/24/20 2026      < > 132*   POTASSIUM 4.6   < > 5.0   CHLORIDE 104   < > 101   BRYANNA 8.1*   < > 8.2*   CO2 24   < > 23   BUN 75*   < > 67*   CR 1.82*   < > 1.97*   *   < > 269*   AST  --   --  12   ALT  --   --  28   BILITOTAL  --   --  0.5   ALBUMIN  --   --  2.7*   PROTTOTAL  --   --  5.7*   ALKPHOS  --   --  124    < > = values in this interval not displayed.       IMAGING:  Results for orders placed or performed during the hospital encounter of 08/17/20   X-ray Chest 2 vws*    Narrative    XR CHEST 2 VW8/17/2020 8:15 AM    INDICATION: Heart replaced by transplant (H)    COMPARISON:  7/28/2020    FINDINGS: PA and lateral views of the chest. Cardiac silhouette is  stable from prior exam. Postoperative changes from bilateral lung  transplantation. Left arm PICC has been removed. Small amount of left  basilar atelectasis or scarring. No pneumothorax . Trace left pleural  effusion. Upper abdomen is unremarkable. Clips and retained sheath in  the right axilla. No acute osseous abnormalities.      Impression    IMPRESSION: Postoperative changes from bilateral lung transplantation.  Small amount of left basilar atelectasis or scarring and no other  focal airspace opacities.    I have personally reviewed the examination and initial interpretation  and I agree with the findings.    JAYLYN PATTERSON MD     *Note: Due to a large number of results and/or encounters for the requested time period, some results have not been displayed. A complete set of results can be found in Results Review.       PROCEDURES:  None     CONSULTATIONS:   Endocrine  Hematology  Lymphedema     DISCHARGE MEDICATIONS:  Current Discharge Medication List      START taking these medications    Details   clotrimazole (MYCELEX) 10 MG lozenge Place 1 lozenge inside cheek 4 times daily  Qty: 120  lozenge, Refills: 3    Associated Diagnoses: Heart replaced by transplant (H)      !! Continuous Blood Gluc Sensor (FREESTYLE JEREMY 14 DAY SENSOR) MISC Change every 14 days.  Qty: 2 each, Refills: 11    Associated Diagnoses: Type 2 diabetes mellitus with hyperglycemia, with long-term current use of insulin (H)       !! - Potential duplicate medications found. Please discuss with provider.      CONTINUE these medications which have CHANGED    Details   fondaparinux ANTICOAGULANT (ARIXTRA) 7.5MG/0.6ML injection Inject 0.6 mLs (7.5 mg) Subcutaneous every 24 hours Hold dose on the mornings of your right heart catheterization and biopsies  Qty: 90 Syringe, Refills: 3    Associated Diagnoses: HIT (heparin-induced thrombocytopenia) (H)      insulin aspart (NOVOLOG PEN) 100 UNIT/ML pen Inject 15 units with each meal. Also inject extra per sliding scale provided in your After Visit Summary from 8/28/20.  Qty: 9 mL, Refills: 4    Associated Diagnoses: Heart transplant, orthotopic, status (H)      insulin isophane human (HUMULIN N PEN) 100 UNIT/ML injection Inject 35 Units Subcutaneous every morning  Qty: 30 mL, Refills: 1    Associated Diagnoses: Type 2 diabetes mellitus with diabetic nephropathy, with long-term current use of insulin (H)      polyethylene glycol (MIRALAX) 17 g packet 17 g by Oral or Feeding Tube route daily as needed for constipation  Qty: 7 packet, Refills: 0    Associated Diagnoses: Heart transplant, orthotopic, status (H)      tacrolimus (GENERIC EQUIVALENT) 1 MG capsule Take 3 capsules (3 mg) by mouth 2 times daily Or as directed by transplant clinic.  Qty: 330 capsule, Refills: 3    Associated Diagnoses: Heart transplant, orthotopic, status (H)      valGANciclovir (VALCYTE) 450 MG tablet Take 1 tablet (450 mg) by mouth every 48 hours  Qty: 30 tablet, Refills: 3    Associated Diagnoses: Heart transplant, orthotopic, status (H)         CONTINUE these medications which have NOT CHANGED    Details    acetaminophen (TYLENOL) 325 MG tablet Take 3 tablets (975 mg) by mouth every 8 hours as needed for mild pain  Qty: 60 tablet, Refills: 0    Associated Diagnoses: Heart transplant, orthotopic, status (H)      !! blood glucose (ACCU-CHEK SMARTVIEW) test strip USE TO TEST THREE TIMES DAILY AS DIRECTED  Qty: 100 strip, Refills: 0    Comments: Due for appointment for further refills, please give reminder.  Associated Diagnoses: Type 2 diabetes mellitus with diabetic nephropathy, with long-term current use of insulin (H)      !! blood glucose (NO BRAND SPECIFIED) test strip Use to test blood sugar 2 times daily or as directed.  Qty: 200 strip, Refills: 3    Associated Diagnoses: Type 2 diabetes mellitus with diabetic nephropathy, with long-term current use of insulin (H)      blood glucose monitoring (ACCU-CHEK FELIPE SMARTVIEW) meter device kit Use to test blood sugar 3-4 times daily, as directed.  Qty: 1 kit, Refills: 0    Associated Diagnoses: Type 2 diabetes mellitus with diabetic nephropathy, with long-term current use of insulin (H)      blood glucose monitoring (ONE TOUCH DELICA) lancets Use to test blood sugars 2 times daily.  Qty: 200 each, Refills: 3    Associated Diagnoses: Type 2 diabetes mellitus with diabetic nephropathy, with long-term current use of insulin (H)      calcium carbonate 600 mg-vitamin D 400 units (CALTRATE) 600-400 MG-UNIT per tablet Take 1 tablet by mouth 2 times daily (with meals)  Qty: 180 tablet, Refills: 3    Associated Diagnoses: Heart transplant, orthotopic, status (H)      !! Continuous Blood Gluc Sensor (FREESTYLE JEREMY 14 DAY SENSOR) MISC Change every 14 days.  Qty: 6 each, Refills: 4    Comments: Use to check BG 5 times daily.  Associated Diagnoses: Type 2 diabetes mellitus with hyperglycemia, with long-term current use of insulin (H)      hydrALAZINE (APRESOLINE) 25 MG tablet Take 3 tablets (75 mg) by mouth every 8 hours  Qty: 270 tablet, Refills: 1    Associated Diagnoses: Heart  transplant, orthotopic, status (H)      !! insulin pen needle (32G X 4 MM) 32G X 4 MM miscellaneous Use as directed by provider  Per insurance coverage  Qty: 100 each, Refills: 0    Associated Diagnoses: Heart transplant, orthotopic, status (H)      !! insulin pen needle (B-D U/F) 31G X 5 MM miscellaneous 1 Units by Device route 2 times daily Use 2 pen needles daily or as directed.  Qty: 100 each, Refills: 3    Associated Diagnoses: Type 2 diabetes mellitus with diabetic nephropathy, with long-term current use of insulin (H)      !! insulin pen needle (NOVOFINE) 30G X 8 MM miscellaneous Inject 1 Box Subcutaneous 6 times daily Use 6 pen needles daily or as directed.  Qty: 200 each, Refills: 3    Comments: Has both Novolog Flex pen and several Humalog Kwik pen, but doesn't have correct needle.  Associated Diagnoses: Type 2 diabetes mellitus with hyperglycemia, with long-term current use of insulin (H)      mycophenolate (GENERIC EQUIVALENT) 250 MG capsule Take 6 capsules (1,500 mg) by mouth 2 times daily  Qty: 360 capsule, Refills: 3    Comments: TXP DT 7/19/2020 (Heart) TXP Dischg DT  DX Heart replaced by transplant Z94.1 TX [facility] ([place], [place])  Associated Diagnoses: Heart transplant, orthotopic, status (H)      pantoprazole (PROTONIX) 40 MG EC tablet Take 1 tablet (40 mg) by mouth every morning (before breakfast)  Qty: 90 tablet, Refills: 3    Associated Diagnoses: Heart transplant, orthotopic, status (H)      predniSONE (DELTASONE) 10 MG tablet Take 1.5 tablets (15 mg) by mouth every morning AND 1 tablet (10 mg) every evening.  Qty: 180 tablet, Refills: 1    Comments: TXP DT 7/19/2020 (Heart) TXP Dischg DT  DX Heart transplant Z94.1 TX [facility] ([place], [place])  Associated Diagnoses: Heart transplant, orthotopic, status (H)      rosuvastatin (CRESTOR) 10 MG tablet Take 1 tablet (10 mg) by mouth daily  Qty: 90 tablet, Refills: 3    Associated Diagnoses:  Heart transplant, orthotopic, status (H)      sulfamethoxazole-trimethoprim (BACTRIM) 400-80 MG tablet Take 1 tablet by mouth daily  Qty: 90 tablet, Refills: 3    Associated Diagnoses: Heart transplant, orthotopic, status (H)      tamsulosin (FLOMAX) 0.4 MG capsule Take 1 capsule (0.4 mg) by mouth daily  Qty: 30 capsule, Refills: 11    Associated Diagnoses: Heart transplant, orthotopic, status (H)      Alcohol Swabs PADS Use to swab the area of the injection or sergio as directed   Per insurance coverage  Qty: 100 each, Refills: 0    Associated Diagnoses: Heart transplant, orthotopic, status (H)      order for DME Auto CPAP 8-15 cmH20  Qty: 1 Units, Refills: 0      senna-docusate (SENOKOT-S/PERICOLACE) 8.6-50 MG tablet Take 1 tablet by mouth 2 times daily as needed for constipation  Qty: 50 tablet, Refills: 0    Associated Diagnoses: Heart transplant, orthotopic, status (H)       !! - Potential duplicate medications found. Please discuss with provider.      STOP taking these medications       amLODIPine (NORVASC) 5 MG tablet Comments:   Reason for Stopping:         furosemide (LASIX) 20 MG tablet Comments:   Reason for Stopping:         glimepiride (AMARYL) 2 MG tablet Comments:   Reason for Stopping:         methocarbamol (ROBAXIN) 500 MG tablet Comments:   Reason for Stopping:         nystatin (MYCOSTATIN) 519502 UNIT/ML suspension Comments:   Reason for Stopping:         sitagliptin (JANUVIA) 100 MG tablet Comments:   Reason for Stopping:         tacrolimus (GENERIC EQUIVALENT) 0.5 MG capsule Comments:   Reason for Stopping:               DISCHARGE DISPOSITION: Lucian Henderson Sr. will discharge to home in stable condition.     DISCHARGE INSTRUCTIONS:  Discharge Procedure Orders   LYMPHEDEMA THERAPY REFERRAL   Standing Status: Future   Referral Priority: Routine Referral Type: Rehab Therapy Physical Therapy   Number of Visits Requested: 1     Home care nursing referral   Referral Priority: Routine Referral  Type: Home Health Therapies & Aides   Number of Visits Requested: 1     MD face to face encounter   Order Comments: Documentation of Face to Face and Certification for Home Health Services    I certify that patient: Lucian Henderson Sr. is under my care and that I, or a nurse practitioner or physician's assistant working with me, had a face-to-face encounter that meets the physician face-to-face encounter requirements with this patient on: 8/28/20    This encounter with the patient was in whole, or in part, for the following medical condition, which is the primary reason for home health care: Heart Transplant    I certify that, based on my findings, the following services are medically necessary home health services: Nursing    Further, I certify that my clinical findings support that this patient is homebound, absences from home require considerable and taxing effort and are for medical reasons or [employer] services or infrequently or of short duration when for other reasons.    Based on the above findings. I certify that this patient is confined to the home and needs intermittent skilled nursing care, physical therapy and/or speech therapy.  The patient is under my care, and I have initiated the establishment of the plan of care.  This patient will be followed by a physician who will periodically review the plan of care.  Physician/Provider to provide follow up care: Suyapa Hatfield    Attending hospital physician (the Medicare certified [place] provider):   Physician Signature: See electronic signature associated with these discharge orders.  Date: 8/28/2020     Reason for your hospital stay   Order Comments: High blood sugars, low tacrolimus level (one of your antirejection medications), and anemia (low hemoglobin)     Adult [facility] Follow-up and recommended labs and tests   Order Comments: Follow up with me,  ELIJAH Cm CNP, on Monday after your heart biopsy. I will come see you in Unit [address].      Appointments on [place] and/or [facility] (with [facility] or [facility] provider or service). Call [phone] if you haven't heard regarding these appointments within 7 days of discharge.     Activity   Order Comments: Your activity upon discharge: activity as tolerated     Order Specific Question Answer Comments   Is discharge order? Yes      When to contact your care team   Order Comments: Call your transplant coordinator if you have any of the following: temperature greater than 100.4,  increased shortness of breath, increased swelling, headaches, decreased urination, or elevated blood sugars above 250.     Diet   Order Comments: Follow this diet upon discharge: Orders Placed This Encounter      Fluid restriction 2000 ML FLUID      Snacks/Supplements Adult: Gelatein sugar-free; Between Meals      Combination Diet 7690-6980 Calories: High Consistent CHO (4-7 CHO units/meal); 2 gm NA Diet     Order Specific Question Answer Comments   Is discharge order? Yes        25 minutes spent in discharge, including >50% in counseling and coordination of care, medication review and plan of care recommended on follow up. Questions were answered, patient agrees to plan.

## 2020-08-28 NOTE — PROGRESS NOTES
Care Coordinator Progress Note    Admission Date/Time:  8/24/2020  Attending MD:  Arvin Sheridan MD    Data  Chart reviewed, discussed with interdisciplinary team.   Patient was admitted for: Hyperglycemia    Concerns with insurance coverage for discharge needs:  None identified  Current Living Situation: Patient lives with Wife  Support System: Involved and supportive  Services Involved:   Transportation at Discharge: Family  Transportation to Medical Appointments: Family  Barriers to Discharge: Medical clearance    Coordination of Care and Referrals: Provided patient/family with options for Home Care.      Assessment  Pt s/p Heart Transplant 7/19/20, readmitted with hyperglycemia. Per Cariology Team, anticipated discharge today. I have met with Pt, using a  via IPad.  Pt lives with his Wife, and Daughter assists as needed.  Home Care follow up discussed which Pt is agreeable to. Choice of Home Care offered,  Home Care is preferred.  I have made a referral to [facility] and added Nursing to the discharge orders.     Plan  Anticipated Discharge Date:  8/28/20  Anticipated Discharge Plan: Discharge to home with intermittent Home care visits provided by  Home Care     Cecile Nunez RN  6B care coordinator #[phone]

## 2020-08-28 NOTE — TELEPHONE ENCOUNTER
Call complete for pre procedure reminder, travel screen and updated visitor policy.  Per heart transplant criteria, Covid test not done.  Lorri Desai RN

## 2020-08-28 NOTE — PLAN OF CARE
Neuro: A&Ox4, afebrile, denies numbness/tingling.   Cardiac: SR. VSS.    Respiratory: Sating >95% on RA.  GI/: Adequate urine output.   Diet/appetite: Tolerating diabetic diet. Eating well. BG checks with meals and carb counting with correction insulin.   Activity: Stand-by assist/independent, up to chair.  Pain: Denies, at acceptable level on current regimen.   Skin: No new deficits noted, lymphedema wraps on.  LDA's: PIV saline locked.    -Pt and wife met with diabetic educator today.     Plan: Continue with POC. Notify primary team with changes.

## 2020-08-28 NOTE — PLAN OF CARE
Neuro: A&Ox4. afebrile  Cardiac: SR. VSS. /69   Pulse 93   Temp 98  F (36.7  C) (Oral)   Resp 18   Wt 76.9 kg (169 lb 8.5 oz)   SpO2 99%   BMI 28.21 kg/m     Respiratory: Sating 97-99% on RA.  GI/: Adequate urine output. No BM, passing flatus.  Weight unchanged from previous day.  Diet/appetite: Tolerating 2 gram Na+ diet with 2L FR. Good appetite..  Activity:  Assist of SBA/independent up to chair and in halls.  Pain: At acceptable level on current regimen. Denied pain throughout the night.  Skin: No new deficits noted.-No change  LDA's: L PIV saline locked    Plan: Continue with POC. Notify primary team with changes.

## 2020-08-28 NOTE — PROGRESS NOTES
DISCHARGE                   ------------------  Discharged to: Home  Via: private transportation  Accompanied by: Family  Discharge Instructions: cardiac diet, light activity, medications, follow up appointments, when to call the MD, aftercare instructions.  Prescriptions: To be filled by discharge pharmacy; medication list reviewed & sent with pt  Follow Up Appointments: arranged; information given  Belongings: All sent with pt  IV: d/c'd  Telemetry: d/c'd  Pt exhibits understanding of above discharge instructions; all questions answered.     Discharge Paperwork: Signed, copied, and sent home with patient.

## 2020-08-28 NOTE — PROGRESS NOTES
Diabetes Consult Daily  Progress Note          Assessment/Plan:      HPI:  Lucian Henderson Sr is a 64 year old male with uncontrolled type 2 diabetes, hypertension, CKD stage 3, hx of RIJ clot and remains on fondaparinux due to HIT hx, ICM s/p OHT 7/19/2020, admitted late on 8/24 for hyperglycemia.  Now experiencing hugely improved glucose control (and hypoglycemia) with lower insulin doses and without oral antihyperglycemics that may be explained by resolution of glucose toxicity, change in administration technique, and change in nutritional intake while hospitalized.     Insulin need remains lower and still without oral antihyperglycemics.  Elevated creatinine and reduced carb intake are contributors.    ASSESSMENT:       1)  Type II Diabetes Mellitus; poor control.  A1c 8.2 %     Plan:     -  NPH 35 units QAM  -  No NPH at HS  -  Aspart/lispro 15 units per meal (meal target 75 grams carb)    Aspart/lispro correction    -169 give 2 units.  -199 give 4 units.  -229 give 6 units.  -259 give 8 units.  -289 give 10 units.  -319 give 12 units.  -349 give 14 units.  BG >/= 350 give 16 units.    -  outpatient follow up-- Marisabel Prieto PA-C next Tuesday 9/1.  Sent updated staff message on findings while hospitalized      On discharge, please prescribe both NPH and rapid-acting insulin.  Family has pen needles at home now.  Confirmed  they have sufficient test strips for the new meter to last until they receive additional strips from [facility] by mail.     Plan discussed with patient, wife and primary team.           Interval History:     The last 24 hours progress and nursing notes reviewed.  Appears diabetic teaching completed.  He was able to describe his daily regimen and NPH dose easily.  Confirm they do have supplies and test strips at home. Denies any new concerns today.  Able to eat, no nausea or vomiting.  No bowel/bladder issues.  Abd is pain  free.  LE with mild edema present - legs wrapped, denies any pain.     Aware for the need to follow up in [facility] endocrinology clinic appt 9/1.    Recent Labs   Lab 08/28/20  0432 08/28/20  0233 08/27/20  2304 08/27/20  1721 08/27/20  1617 08/27/20  1154 08/27/20  0758 08/27/20  0453  08/26/20  0455  08/25/20  0445  08/24/20 2026   *  --   --   --   --   --   --  145*  --  91  --  231*  --  269*   BGM  --  155* 149* 191* 197* 194* 132*  --    < >  --    < >  --    < >  --     < > = values in this interval not displayed.           Nutrition:     Orders Placed This Encounter      Combination Diet 6613-5654 Calories: High Consistent CHO (4-7 CHO units/meal); 2 gm NA Diet          PTA Regimen:   5x /day BG monitoring frequency  glimepride 6 mg daily   sitagliptin 100 mg daily (not renally adjusted-- GFR is lower recently)  NPH 50 units + 40 units, give with prednisone (15 mg + 10 mg)  Lispro correction scale  Do Not give Correction Insulin if Pre-Meal BG less than 140    -169 give 2 units.    -199 give 4 units.    -229 give 6 units.    -259 give 8 units.    -289 give 10 units.    -319 give 12 units.    -349 give 14 units.    BG >/= 350 give 16 units.                   Review of Systems:   See interval hx          Medications:   Steroids:    Prednisone 15 + 10 mg  Tacrolimus dose incresing       Physical Exam:     /65 (BP Location: Right arm)   Pulse 92   Temp 97.9  F (36.6  C) (Oral)   Resp 16   Wt 76.9 kg (169 lb 8.5 oz)   SpO2 99%   BMI 28.21 kg/m      General: pleasant, in no acute distress. Sitting comfortably at the side of the bed  HEENT: NC/AT. MMM, sclera not injected  Lungs: unlabored, no cough, no SOB  ABD: rounded, soft, no lipodystrophy noted  Skin: warm and dry, no obvious lesions  MSK:  moving all extremities  Lymp:  mild LE edema  Mental Status: Alert and oriented x3  Psych:  Cooperative, good eye contact - jovial         Data:     Lab Results    Component Value Date    A1C 8.2 07/03/2020    A1C 7.9 07/08/2019    A1C 8.5 03/11/2019    A1C 7.6 10/16/2018    A1C 9.8 09/06/2017            CBC RESULTS:   Recent Labs   Lab Test 08/28/20  0432   WBC 3.6*   RBC 2.60*   HGB 7.9*   HCT 26.1*      MCH 30.4   MCHC 30.3*   RDW 20.8*        Recent Labs   Lab Test 08/28/20  0432 08/27/20  0453    134   POTASSIUM 4.6 4.6   CHLORIDE 104 102   CO2 24 25   ANIONGAP 7 8   * 145*   BUN 75* 68*   CR 1.82* 1.86*   BRYANNA 8.1* 8.3*     Liver Function Studies -   Recent Labs   Lab Test 08/24/20 2026   PROTTOTAL 5.7*   ALBUMIN 2.7*   BILITOTAL 0.5   ALKPHOS 124   AST 12   ALT 28     Lab Results   Component Value Date    INR 1.11 08/25/2020    INR 1.16 08/17/2020    INR 1.35 07/20/2020    INR 1.35 07/20/2020    INR 1.52 07/20/2020    INR 1.59 07/20/2020    INR 1.23 07/19/2020    INR 1.18 07/19/2020    INR 1.18 07/19/2020    INR 1.05 07/13/2020    INR 1.12 07/03/2020    INR 1.09 07/02/2020       ELIJAH Browning CNP   Inpatient Diabetes Management Service  Pager - [phone]  Diabetes Management Team job code: [id number]     I spent a total of 25 minutes bedside and on the inpatient unit managing glycemic care.  Over 50% of my time on the unit was spent counseling the patient and/or coordinating care regarding acute hyperglycemic management.  See note for details.

## 2020-08-29 LAB
COPPER SERPL-MCNC: 79.8 UG/DL (ref 70–140)
G6PD RBC-CCNC: 16.1 U/G HB (ref 9.9–16.6)

## 2020-08-30 ENCOUNTER — PATIENT OUTREACH (OUTPATIENT)
Dept: CARE COORDINATION | Facility: CLINIC | Age: 65
End: 2020-08-30

## 2020-08-30 NOTE — PROGRESS NOTES
Date: 8/31/2020 Status: [facility]   Time: 9:00 AM  9:00 AM Length: 150  180   Visit Type: PROCEDURE - 2.5 HR [1719] CECILY: [id number]   Provider: DEANDRE ROOM 2  VIDEO,  Department: [facility] PRE/POST SEDAT  UR  SERVICE     Patient has clinic visit within 24-48 hours of Discharge so no post DC follow up call is needed

## 2020-08-31 ENCOUNTER — APPOINTMENT (OUTPATIENT)
Dept: INTERPRETER SERVICES | Facility: CLINIC | Age: 65
End: 2020-08-31
Payer: COMMERCIAL

## 2020-08-31 ENCOUNTER — HOSPITAL ENCOUNTER (OUTPATIENT)
Facility: CLINIC | Age: 65
Discharge: HOME | End: 2020-08-31
Attending: INTERNAL MEDICINE | Admitting: INTERNAL MEDICINE
Payer: COMMERCIAL

## 2020-08-31 ENCOUNTER — APPOINTMENT (OUTPATIENT)
Dept: MEDSURG UNIT | Facility: CLINIC | Age: 65
End: 2020-08-31
Attending: INTERNAL MEDICINE
Payer: COMMERCIAL

## 2020-08-31 ENCOUNTER — APPOINTMENT (OUTPATIENT)
Dept: CARDIOLOGY | Facility: CLINIC | Age: 65
End: 2020-08-31
Attending: INTERNAL MEDICINE
Payer: COMMERCIAL

## 2020-08-31 VITALS
OXYGEN SATURATION: 97 % | DIASTOLIC BLOOD PRESSURE: 69 MMHG | TEMPERATURE: 98.6 F | RESPIRATION RATE: 20 BRPM | HEART RATE: 91 BPM | SYSTOLIC BLOOD PRESSURE: 138 MMHG

## 2020-08-31 DIAGNOSIS — Z94.1 HEART REPLACED BY TRANSPLANT (H): ICD-10-CM

## 2020-08-31 LAB
ALBUMIN SERPL-MCNC: 2.7 G/DL (ref 3.4–5)
ALP SERPL-CCNC: 117 U/L (ref 40–150)
ALT SERPL W P-5'-P-CCNC: 31 U/L (ref 0–70)
ANION GAP SERPL CALCULATED.3IONS-SCNC: 4 MMOL/L (ref 3–14)
AST SERPL W P-5'-P-CCNC: 13 U/L (ref 0–45)
B-HCG FREE SERPL-ACNC: 1.2 [IU]/L (ref 0.86–1.14)
BILIRUB SERPL-MCNC: 0.4 MG/DL (ref 0.2–1.3)
BUN SERPL-MCNC: 68 MG/DL (ref 7–30)
CALCIUM SERPL-MCNC: 8.4 MG/DL (ref 8.5–10.1)
CHLORIDE SERPL-SCNC: 105 MMOL/L (ref 94–109)
CO2 SERPL-SCNC: 23 MMOL/L (ref 20–32)
CREAT SERPL-MCNC: 1.77 MG/DL (ref 0.66–1.25)
ERYTHROCYTE [DISTWIDTH] IN BLOOD BY AUTOMATED COUNT: 20.4 % (ref 10–15)
GFR SERPL CREATININE-BSD FRML MDRD: 39 ML/MIN/{1.73_M2}
GLUCOSE SERPL-MCNC: 281 MG/DL (ref 70–99)
HCT VFR BLD AUTO: 26.6 % (ref 40–53)
HGB BLD-MCNC: 8.2 G/DL (ref 13.3–17.7)
MAGNESIUM SERPL-MCNC: 1.8 MG/DL (ref 1.6–2.3)
MCH RBC QN AUTO: 31.2 PG (ref 26.5–33)
MCHC RBC AUTO-ENTMCNC: 30.8 G/DL (ref 31.5–36.5)
MCV RBC AUTO: 101 FL (ref 78–100)
PHOSPHATE SERPL-MCNC: 3.3 MG/DL (ref 2.5–4.5)
PLATELET # BLD AUTO: 209 10E9/L (ref 150–450)
POTASSIUM SERPL-SCNC: 5.2 MMOL/L (ref 3.4–5.3)
PROT SERPL-MCNC: 5.6 G/DL (ref 6.8–8.8)
RBC # BLD AUTO: 2.63 10E12/L (ref 4.4–5.9)
SODIUM SERPL-SCNC: 132 MMOL/L (ref 133–144)
TACROLIMUS BLD-MCNC: 9.6 UG/L (ref 5–15)
TME LAST DOSE: NORMAL H
WBC # BLD AUTO: 3.4 10E9/L (ref 4–11)

## 2020-08-31 PROCEDURE — 93321 DOPPLER ECHO F-UP/LMTD STD: CPT | Mod: 26 | Performed by: INTERNAL MEDICINE

## 2020-08-31 PROCEDURE — 85610 PROTHROMBIN TIME: CPT

## 2020-08-31 PROCEDURE — 93505 ENDOMYOCARDIAL BIOPSY: CPT | Performed by: INTERNAL MEDICINE

## 2020-08-31 PROCEDURE — 93308 TTE F-UP OR LMTD: CPT

## 2020-08-31 PROCEDURE — 85027 COMPLETE CBC AUTOMATED: CPT | Performed by: INTERNAL MEDICINE

## 2020-08-31 PROCEDURE — 27210794 ZZH OR GENERAL SUPPLY STERILE: Performed by: INTERNAL MEDICINE

## 2020-08-31 PROCEDURE — 99207 ZZC NO BILLABLE SERVICE THIS VISIT: CPT | Performed by: NURSE PRACTITIONER

## 2020-08-31 PROCEDURE — 25000125 ZZHC RX 250: Performed by: INTERNAL MEDICINE

## 2020-08-31 PROCEDURE — 80197 ASSAY OF TACROLIMUS: CPT | Performed by: INTERNAL MEDICINE

## 2020-08-31 PROCEDURE — 93505 ENDOMYOCARDIAL BIOPSY: CPT | Mod: 26 | Performed by: INTERNAL MEDICINE

## 2020-08-31 PROCEDURE — 40000166 ZZH STATISTIC PP CARE STAGE 1

## 2020-08-31 PROCEDURE — 36415 COLL VENOUS BLD VENIPUNCTURE: CPT | Performed by: INTERNAL MEDICINE

## 2020-08-31 PROCEDURE — 88350 IMFLUOR EA ADDL 1ANTB STN PX: CPT | Performed by: INTERNAL MEDICINE

## 2020-08-31 PROCEDURE — 84100 ASSAY OF PHOSPHORUS: CPT | Performed by: INTERNAL MEDICINE

## 2020-08-31 PROCEDURE — 83735 ASSAY OF MAGNESIUM: CPT | Performed by: INTERNAL MEDICINE

## 2020-08-31 PROCEDURE — 88346 IMFLUOR 1ST 1ANTB STAIN PX: CPT | Performed by: INTERNAL MEDICINE

## 2020-08-31 PROCEDURE — 93308 TTE F-UP OR LMTD: CPT | Mod: 26 | Performed by: INTERNAL MEDICINE

## 2020-08-31 PROCEDURE — 80053 COMPREHEN METABOLIC PANEL: CPT | Performed by: INTERNAL MEDICINE

## 2020-08-31 PROCEDURE — 93325 DOPPLER ECHO COLOR FLOW MAPG: CPT | Mod: 26 | Performed by: INTERNAL MEDICINE

## 2020-08-31 PROCEDURE — 88307 TISSUE EXAM BY PATHOLOGIST: CPT | Performed by: INTERNAL MEDICINE

## 2020-08-31 RX ORDER — LIDOCAINE 40 MG/G
CREAM TOPICAL
Status: COMPLETED | OUTPATIENT
Start: 2020-08-31 | End: 2020-08-31

## 2020-08-31 RX ADMIN — LIDOCAINE: 40 CREAM TOPICAL at 09:54

## 2020-08-31 NOTE — PROGRESS NOTES
"ADULT HEART TRANSPLANT     HPI:   Mr. Tee Henderson is a 64 year old male who presents to 2A today for routine heart transplant follow-up. Patient has history of ICM s/p OHT 7/19/2020, DMII, CKD stage 3, RUE DVT c/b HIT, and HTN. He underwent orthotopic heart transplant on 7/19/20. Post op course c/b primary graft dysfunction on POD1, EF 20-25% and severe RV dysfunction felt secondary to prolonged ischemic time. Last echocardiogram 8/17 showed normal graft function, last RHC 8/17 with normal filling pressures. He has no history of rejection or DSAs. He was recently admitted for hyperglycemia from 8/24-8/28.  This is felt due to user error with insulin needles.  He remained inpatient due to subtherapeutic tacrolimus levels.  He was started on clotrimazole to help boost levels which elevated his trough to 25.  Tacrolimus level on day of discharge 8.3.  His goal is dropped to 8-10 due to ongoing renal dysfunction.  He is also noted to have anemia without significant leukopenia or thrombocytopenia.  Hematology was consulted and felt this was secondary to medications.  His daughter is toxo positive so we are unable to stop Bactrim at this time.  Hemoglobin was stable prior to discharge.  He was also diuresed gently and lymphedema consulted given ongoing lower extremity edema.  Centrally he appears euvolemic on day of discharge and was discharged without Lasix.    Since hospital discharge patient reports feeling okay.  He has no new complaints.  His legs are very swollen today and is not wearing his lymphedema wraps.  States he has been wearing them during sleep but not during the day.  Home care nursing was set up at time of discharge but they have not been to his house yet.  Says his blood sugars are \"okay.  No longer having blood sugars in the 400s.  He does have an appointment with diabetes tomorrow.  Denies fever, chills, nausea, vomiting, rashes, mouth sores, headaches.  He does note worsening tremor.  Denies " orthopnea, PND, exertional chest pain or shortness of breath.    PAST MEDICAL HISTORY:  Past Medical History:   Diagnosis Date     CAD (coronary artery disease)      CHF (congestive heart failure) (H)      CKD (chronic kidney disease), stage III (H)      Cortical cataract of both eyes      Diabetes (H)      Hyperlipidemia      Hypertension      Ischemic cardiomyopathy      Obesity      CHANTEL (obstructive sleep apnea)     occas cpap     Osteoarthritis        FAMILY HISTORY:  Family History   Problem Relation Age of Onset     Diabetes Brother      Diabetes Sister      Diabetes Sister      Macular Degeneration No family hx of      Glaucoma No family hx of      Myocardial Infarction No family hx of      Kidney Disease No family hx of        SOCIAL HISTORY:  Social History     Socioeconomic History     Marital status:      Spouse name: Not on file     Number of children: 4     Years of education: Not on file     Highest education level: Not on file   Occupational History     Occupation:      Employer: [employer]     Employer: RETIRED   Social Needs     Financial resource strain: Not on file     Food insecurity     Worry: Not on file     Inability: Not on file     Transportation needs     Medical: Not on file     Non-medical: Not on file   Tobacco Use     Smoking status: Never Smoker     Smokeless tobacco: Never Used     Tobacco comment: Never smoked; non-smoking household   Substance and Sexual Activity     Alcohol use: No     Alcohol/week: 0.0 standard drinks     Drug use: No     Sexual activity: Yes     Partners: Female   Lifestyle     Physical activity     Days per week: Not on file     Minutes per session: Not on file     Stress: Not on file   Relationships     Social connections     Talks on phone: Not on file     Gets together: Not on file     Attends Buddhist service: Not on file     Active member of club or organization: Not on file     Attends meetings of clubs or organizations: Not on  file     Relationship status: Not on file     Intimate partner violence     Fear of current or ex partner: Not on file     Emotionally abused: Not on file     Physically abused: Not on file     Forced sexual activity: Not on file   Other Topics Concern     Parent/sibling w/ CABG, MI or angioplasty before 65F 55M? No   Social History Narrative     Not on file       CURRENT MEDICATIONS:  No current facility-administered medications on file prior to encounter.   acetaminophen (TYLENOL) 325 MG tablet, Take 3 tablets (975 mg) by mouth every 8 hours as needed for mild pain  Alcohol Swabs PADS, Use to swab the area of the injection or sergio as directed   Per insurance coverage  blood glucose (ACCU-CHEK SMARTVIEW) test strip, USE TO TEST THREE TIMES DAILY AS DIRECTED  blood glucose (NO BRAND SPECIFIED) test strip, Use to test blood sugar 2 times daily or as directed.  blood glucose monitoring (ACCU-CHEK FELIPE SMARTVIEW) meter device kit, Use to test blood sugar 3-4 times daily, as directed.  blood glucose monitoring (ONE TOUCH DELICA) lancets, Use to test blood sugars 2 times daily.  hydrALAZINE (APRESOLINE) 25 MG tablet, Take 3 tablets (75 mg) by mouth every 8 hours  insulin pen needle (32G X 4 MM) 32G X 4 MM miscellaneous, Use as directed by provider  Per insurance coverage  order for DME, Auto CPAP 8-15 cmH20  senna-docusate (SENOKOT-S/PERICOLACE) 8.6-50 MG tablet, Take 1 tablet by mouth 2 times daily as needed for constipation        ROS:  See HPI    EXAM:  /69 (BP Location: Left arm)   Pulse 91   Temp 98.6  F (37  C) (Oral)   Resp 20   SpO2 97%     GENERAL: Appears comfortable, in no distress.  HEENT: Eye symmetrical and without discharge or icterus bilaterally. Mucous membranes moist and without lesions.  NECK: Supple, JVD not visible at 90 degrees.   CV: Tachycardic and regular, +S1S2, no murmur, rub, or gallop.   RESPIRATORY: Respirations regular, even, and unlabored. Lungs CTA throughout.   GI: Soft, obese  and non distended with normoactive bowel sounds present in all quadrants. No tenderness, rebound, guarding.   EXTREMITIES: 2-+ peripheral edema to knees. 1+ bilateral pedal pulses.   NEUROLOGIC: Alert and orientated x 3. No focal deficits.   MUSCULOSKELETAL: No joint swelling or tenderness.   SKIN: No jaundice. No rashes or lesions.     Labs - reviewed with patient in clinic today:  CBC RESULTS:  Lab Results   Component Value Date    WBC 3.4 (L) 08/31/2020    RBC 2.63 (L) 08/31/2020    HGB 8.2 (L) 08/31/2020    HCT 26.6 (L) 08/31/2020     (H) 08/31/2020    MCH 31.2 08/31/2020    MCHC 30.8 (L) 08/31/2020    RDW 20.4 (H) 08/31/2020     08/31/2020       CMP RESULTS:  Lab Results   Component Value Date     (L) 08/31/2020    POTASSIUM 5.2 08/31/2020    CHLORIDE 105 08/31/2020    CO2 23 08/31/2020    ANIONGAP 4 08/31/2020     (H) 08/31/2020    BUN 68 (H) 08/31/2020    CR 1.77 (H) 08/31/2020    GFRESTIMATED 39 (L) 08/31/2020    GFRESTBLACK 45 (L) 08/31/2020    BRYANNA 8.4 (L) 08/31/2020    BILITOTAL 0.4 08/31/2020    ALBUMIN 2.7 (L) 08/31/2020    ALKPHOS 117 08/31/2020    ALT 31 08/31/2020    AST 13 08/31/2020        INR RESULTS:  Lab Results   Component Value Date    INR 1.11 08/25/2020       LIPID RESULTS:  Lab Results   Component Value Date    CHOL 118 08/17/2020    HDL 75 08/17/2020    LDL 26 08/17/2020    TRIG 82 08/17/2020    CHOLHDLRATIO 2.6 06/27/2015       IMMUNOSUPPRESSANT LEVELS:  Lab Results   Component Value Date    TACROL 8.3 08/28/2020    DOSTAC Not Provided 08/28/2020       No components found for: CK  Lab Results   Component Value Date    MAG 1.8 08/28/2020     Lab Results   Component Value Date    A1C 8.2 (H) 07/03/2020     Lab Results   Component Value Date    PHOS 3.5 08/17/2020     Lab Results   Component Value Date    NTBNP 6,187 (H) 11/25/2019     Lab Results   Component Value Date    SAITESTMET SA FCS 08/17/2020    SOLITARIOLL Class I 08/17/2020    AT1RONHHX None 08/17/2020     [id number] B:64 08/17/2020    SAIREPCOM  08/17/2020      Test performed by modified procedure. Serum heat inactivated and tested   by a modified ([place]) protocol including fetal calf serum addition.   High-risk, mfi >3,000. Mod-risk, mfi 500-3,000.       Lab Results   Component Value Date    SAIITESTME SA FCS 08/17/2020    SAIICELL Class II 08/17/2020    [id number] None 08/17/2020    [id number] DR:11 08/17/2020    SAIIREPCOM  08/17/2020      Test performed by modified procedure. Serum heat inactivated and tested   by a modified ([place]) protocol including fetal calf serum addition.   High-risk, mfi >3,000. Mod-risk, mfi 500-3,000.       Lab Results   Component Value Date    CSPEC Plasma, EDTA anticoagulant 08/17/2020       Diagnostic Studies:  RHC 8/17/20  RA 11/10/7  RV 33/7  PA 33/16/24  PCWP 15/15/13  LIO 5.6/3.0  TD 5.1/2.7  PVR 1.96 (LIO)    TTE 8/17/20  s/p heart transplant. Patient in bigeminy during examination.  Global and regional left ventricular function is normal with an EF of 55-60%.  The right ventricle is normal size. Global right ventricular function is normal.  Trace tricuspid regurgitation. The peak velocity of the tricuspid regurgitant  jet is not obtainable, but PA acceleration time is shortened.  No pericardial effusion is present.  IVC diameter <2.1 cm collapsing >50% with sniff suggests a normal RA pressure  of 3 mmHg.  This study was compared with the study from 07/29/2020. The pleural and  pericardial effusions are no longer visualized.      Assessment/Plan:  Mr. Tee Henderson is a 64 year old male who is s/p orthotopic heart transplant on 7/19/20 who presents to  for routine follow-up.  Recent admission for hyperglycemia.  Blood sugars elevated today at 281 he does have an appointment with endocrine tomorrow.  Having some increased tremor today and potassium is 5.2.  Creatinine 1.7 but this is his recent baseline.  We did decrease tacrolimus level goal recently to 8-10.  Level pending  today.  Could be responsible for tremor and potassium.  He confirms he is not taking potassium.  Right heart catheterization today shows normal filling pressures continue off Lasix.  Reminded him to wear lymphedema wraps during the daytime hours and remove in the evenings to help with his leg swelling.     He has no history of rejection or DSA's.  Right heart catheterization today shows normal filling pressures and cardiac outputs.  He has a normal echocardiogram today.    #Status post heart transplantation on 7/19/20 due to ICM   #Primary graft dysfunction, resolved  Postoperative course c/b primary graft dysfunction on POD1, EF 20-25% and severe RV dysfunction felt secondary to prolonged ischemic time. Graft function returned to normal on POD 6. I     Graft Function:   --Volume: improved significant LE edema/third spacing per baseline likely 2/2 hypoalbumin, RA 4 PCWP 12 today not on diuretic  --BP: at goal on hydralazine 75 mg tid  --HR: 90s off terbutaline     Immunosuppression:   --prednisone per taper with negative bx, currently 15 mg in AM 10 mg in PM  --Cellcept 1500 mg BID  --tacrolimus trough pending today, goal 8-10     Serostatus: CMV: D+/R+, EBV: D+/R+, Toxo: D+/R-     Prophylaxis:   --CAV: no aspirin on fondaparinux, continue rosuvastatin 10 mg daily  --Thrush: Nystatin   --PCP: Bactrim single strength daily, g6pd normal, consider switching to dapsone given Cr 1.7, defer to Dr Sheridan  --GI: Protonix   --Osteoporosis: calcium/vitamin D   --CMV: renally dosed Valcyte 450 mg EOD x 3 mos (through October)  --Toxo: Bactrim lifelong given D+      Routine Surveillance:   --next biopsy due 9/14     #DAYA on CKD stage III  Baseline Cr 1.5-1.7. Today is 1.7. tacro level pending. Consider switching Bactrim to dapsone, defer to Dr Sheridan.      #Acute on chronic normocytic anemia likely 2/2 marrow suppression  Recent Hgb 6.9-7.7. Intermittent leukopenia and anemia due to bone marrow suppression due to  Cellcept. Worked up as inpatient: iron studies, folate, vitB12 wnl, epo wnl, g6pd wnl, copper pending, peripheral smear 8/24 shows marked slightly macrocytic anemia with persevered erythrocyte regeneration, no definitive evidence of hemolysis, haptoblogin and bili wnl, however LDH elevated 433, low suspicion of hemolysis. Per heme, anemia likely 2/2 CKD, recent surgery, Bactrim/Cellcept. hgb today 8.2 which is improved.     #Bilateral LE edema/third spacing  #Hypoalbuminemia  Present since transplant despite near normal filling pressures. Likely exacerbated by amlodipine which was stopped last admission. Albumin 2.7. Lymphedema consulted and legs wrapped, outpatient referral placed. Nutrition consulted for dietary protein education.      #Hypertension   Discontinued amlodipine given LE edema. BP at goal on hydralazine 75 mg tid.      #RIJ and RUE DVT, provoked   #+HIT  US 7/22/20 nonocclusive thrombus in the right internal jugular vein along the intravenous catheter, nonocclusive thrombus along the right PICC line in the right axillary vein demonstrated, and occlusive thrombus in the superficial right cephalic vein demonstrated as above. +HIT and started on fondaparinux. Continue Fondaparinux at least 3 months then reassess, needs outpatient heme follow-up.     #Urinary retention   Continue PTA tamsulosin      #Donor Streptococcus salivarius bacteremia  Transplant ID consulted post-transplant, teated with ceftriaxone.     25 minutes spent face-to-face with patient, >50% in counseling and/or coordination of care as described above      Geovanna Andino DNP, NP-C  8/31/2020          SHONDA MATTHEW

## 2020-08-31 NOTE — IP AVS SNAPSHOT
MRN:[id number]                      After Visit Summary   8/31/2020    Lucian Henderson Sr.    MRN: [id number]           Visit Information        Department      8/31/2020  8:39 AM [facility]  [facility] Lab          Review of your medicines      UNREVIEWED medicines. Ask your doctor about these medicines       Dose / Directions   acetaminophen 325 MG tablet  Commonly known as:  TYLENOL  Used for:  Heart transplant, orthotopic, status (H)      Dose:  975 mg  Take 3 tablets (975 mg) by mouth every 8 hours as needed for mild pain  Quantity:  60 tablet  Refills:  0     calcium carbonate 600 mg-vitamin D 400 units 600-400 MG-UNIT per tablet  Commonly known as:  CALTRATE  Used for:  Heart transplant, orthotopic, status (H)      Dose:  1 tablet  Take 1 tablet by mouth 2 times daily (with meals)  Quantity:  180 tablet  Refills:  3     clotrimazole 10 MG lozenge  Commonly known as:  MYCELEX  Used for:  Heart replaced by transplant (H)      Dose:  1 lozenge  Place 1 lozenge inside cheek 4 times daily  Quantity:  120 lozenge  Refills:  3     fondaparinux ANTICOAGULANT 7.5MG/0.6ML injection  Commonly known as:  ARIXTRA  Used for:  HIT (heparin-induced thrombocytopenia) (H)      Dose:  7.5 mg  Inject 0.6 mLs (7.5 mg) Subcutaneous every 24 hours Hold dose on the mornings of your right heart catheterization and biopsies  Quantity:  90 Syringe  Refills:  3     hydrALAZINE 25 MG tablet  Commonly known as:  APRESOLINE  Used for:  Heart transplant, orthotopic, status (H)      Dose:  75 mg  Take 3 tablets (75 mg) by mouth every 8 hours  Quantity:  270 tablet  Refills:  1     insulin aspart 100 UNIT/ML pen  Commonly known as:  NovoLOG PEN  Used for:  Heart transplant, orthotopic, status (H)      Inject 15 units with each meal. Also inject extra per sliding scale provided in your After Visit Summary from 8/28/20.  Quantity:  9 mL  Refills:  4     insulin isophane human 100 UNIT/ML injection  Commonly known  as:  HumuLIN N PEN  Used for:  Type 2 diabetes mellitus with diabetic nephropathy, with long-term current use of insulin (H)      Dose:  35 Units  Inject 35 Units Subcutaneous every morning  Quantity:  30 mL  Refills:  1     mycophenolate 250 MG capsule  Commonly known as:  GENERIC EQUIVALENT  Used for:  Heart transplant, orthotopic, status (H)      Dose:  1,500 mg  Take 6 capsules (1,500 mg) by mouth 2 times daily  Quantity:  360 capsule  Refills:  3     pantoprazole 40 MG EC tablet  Commonly known as:  PROTONIX  Used for:  Heart transplant, orthotopic, status (H)      Dose:  40 mg  Take 1 tablet (40 mg) by mouth every morning (before breakfast)  Quantity:  90 tablet  Refills:  3     polyethylene glycol 17 g packet  Commonly known as:  MIRALAX  Used for:  Heart transplant, orthotopic, status (H)      Dose:  17 g  17 g by Oral or Feeding Tube route daily as needed for constipation  Quantity:  7 packet  Refills:  0     predniSONE 10 MG tablet  Commonly known as:  DELTASONE  Used for:  Heart transplant, orthotopic, status (H)      Take 1.5 tablets (15 mg) by mouth every morning AND 1 tablet (10 mg) every evening.  Quantity:  180 tablet  Refills:  1     rosuvastatin 10 MG tablet  Commonly known as:  CRESTOR  Used for:  Heart transplant, orthotopic, status (H)      Dose:  10 mg  Take 1 tablet (10 mg) by mouth daily  Quantity:  90 tablet  Refills:  3     senna-docusate 8.6-50 MG tablet  Commonly known as:  SENOKOT-S/PERICOLACE  Used for:  Heart transplant, orthotopic, status (H)      Dose:  1 tablet  Take 1 tablet by mouth 2 times daily as needed for constipation  Quantity:  50 tablet  Refills:  0     sulfamethoxazole-trimethoprim 400-80 MG tablet  Commonly known as:  BACTRIM  Indication:  Preventative Medication Therapy Used Around Surgery  Used for:  Heart transplant, orthotopic, status (H)      Dose:  1 tablet  Take 1 tablet by mouth daily  Quantity:  90 tablet  Refills:  3     tacrolimus 1 MG capsule  Commonly known  as:  GENERIC EQUIVALENT  Used for:  Heart transplant, orthotopic, status (H)      Dose:  3 mg  Take 3 capsules (3 mg) by mouth 2 times daily Or as directed by transplant clinic.  Quantity:  330 capsule  Refills:  3     tamsulosin 0.4 MG capsule  Commonly known as:  FLOMAX  Used for:  Heart transplant, orthotopic, status (H)      Dose:  0.4 mg  Take 1 capsule (0.4 mg) by mouth daily  Quantity:  30 capsule  Refills:  11     valGANciclovir 450 MG tablet  Commonly known as:  VALCYTE  Indication:  Medication Treatment to Prevent Cytomegalovirus Disease  Used for:  Heart transplant, orthotopic, status (H)      Dose:  450 mg  Take 1 tablet (450 mg) by mouth every 48 hours  Quantity:  30 tablet  Refills:  3        CONTINUE these medicines which have NOT CHANGED       Dose / Directions   Alcohol Swabs Pads  Used for:  Heart transplant, orthotopic, status (H)      Use to swab the area of the injection or sergio as directed   Per insurance coverage  Quantity:  100 each  Refills:  0     blood glucose monitoring lancets  Used for:  Type 2 diabetes mellitus with diabetic nephropathy, with long-term current use of insulin (H)      Use to test blood sugars 2 times daily.  Quantity:  200 each  Refills:  3     blood glucose monitoring meter device kit  Used for:  Type 2 diabetes mellitus with diabetic nephropathy, with long-term current use of insulin (H)      Use to test blood sugar 3-4 times daily, as directed.  Quantity:  1 kit  Refills:  0     * blood glucose test strip  Commonly known as:  Accu-Chek SmartView  Used for:  Type 2 diabetes mellitus with diabetic nephropathy, with long-term current use of insulin (H)      USE TO TEST THREE TIMES DAILY AS DIRECTED  Quantity:  100 strip  Refills:  0     * blood glucose test strip  Commonly known as:  NO BRAND SPECIFIED  Used for:  Type 2 diabetes mellitus with diabetic nephropathy, with long-term current use of insulin (H)      Use to test blood sugar 2 times daily or as  directed.  Quantity:  200 strip  Refills:  3     * FreeStyle Navjot 14 Day Sensor Misc  Used for:  Type 2 diabetes mellitus with hyperglycemia, with long-term current use of insulin (H)      Change every 14 days.  Quantity:  6 each  Refills:  4     * FreeStyle Navjot 14 Day Sensor Misc  Used for:  Type 2 diabetes mellitus with hyperglycemia, with long-term current use of insulin (H)      Change every 14 days.  Quantity:  2 each  Refills:  11     * insulin pen needle 32G X 4 MM miscellaneous  Commonly known as:  32G X 4 MM  Used for:  Heart transplant, orthotopic, status (H)      Use as directed by provider  Per insurance coverage  Quantity:  100 each  Refills:  0     * NovoFine 30G X 8 MM miscellaneous  Used for:  Type 2 diabetes mellitus with hyperglycemia, with long-term current use of insulin (H)  Generic drug:  insulin pen needle      Dose:  1 Box  Inject 1 Box Subcutaneous 6 times daily Use 6 pen needles daily or as directed.  Quantity:  200 each  Refills:  3     * B-D U/F 31G X 5 MM miscellaneous  Used for:  Type 2 diabetes mellitus with diabetic nephropathy, with long-term current use of insulin (H)  Generic drug:  insulin pen needle      Dose:  1 Units  1 Units by Device route 2 times daily Use 2 pen needles daily or as directed.  Quantity:  100 each  Refills:  3     order for DME      Auto CPAP 8-15 cmH20  Quantity:  1 Units  Refills:  0         * This list has 7 medication(s) that are the same as other medications prescribed for you. Read the directions carefully, and ask your doctor or other care provider to review them with you.                  Protect others around you: Learn how to safely use, store and throw away your medicines at www.disposemymeds.org.       Follow-ups after your visit       Your next 10 appointments already scheduled    Aug 31, 2020  Procedure with Moises Santos MD  [facility], [place],  Heart Cath Lab ([facility] [place]) [address] [place] [address]  [facility] [address]  [phone]   The [facility] is located on the corner of [address] and [address] on the [facility]. It is easily accessible from virtually any point in the [facility] area, via [address] and [address].   Sep 01, 2020  4:30 PM CDT  (Arrive by 4:15 PM)  Video Visit with Marisabel Prieto PA-C  [facility] ([facility]) [address]  3rd Floor  [facility] [address]  [phone]   [facility]  Note: this is not an onsite visit; there is no need to come to the facility.  Please have a list of all current medications available for appointment.      Sep 02, 2020 10:00 AM CDT  Cardiac Treatment with Ur Card[facility] Rehab 1  [facility] ([facility]) [address] [place] [address] [address] [place] [id number]  [facility] [address]  [phone]      Sep 04, 2020 10:00 AM CDT  Cardiac Treatment with Ur Cardiac Rehab 1  [facility]) [address] [place] [address] [address]st Floor [address] [address]  [phone]      Sep 09, 2020 10:00 AM CDT  Cardiac Treatment with Ur Cardiac Rehab 1  [facility]) [address] 1st Floor [address]  [facility] [address]  [phone]      Sep 11, 2020 10:00 AM CDT  Cardiac Treatment with Ur Cardiac Rehab 1  [facility] ([facility]) [address] [place] [address] [address]st Floor [address] [address]  [phone]      Sep 11, 2020 11:00 AM CDT  CONSULT with Ur Cardiac Rehab 1  [facility])  [address]st [address]  [facility] [address]  [phone]      Sep 14, 2020 10:00 AM CDT  Cardiac Treatment with Ur Cardiac Rehab 1  [facility], Cardiac Rehabilitation ([facility]) [address]st [address]  [facility] [address]  [phone]      Sep 14, 2020 11:30 AM CDT  LAB with UU LAB GOLD WAITING  [facility], Lab ([facility]) [address] [facility] [address]   Please do not eat 10-12 hours before your appointment if you are coming in fasting for labs on lipids, cholesterol, or glucose (sugar). Does not apply to pregnant women. Water, tea and black coffee (with nothing added) is okay. Do not drink other fluids, diet soda or gum. If you have concerns about taking your medications, please send a message by clicking on Secure Messaging, Message Your Care Team.     Sep 14, 2020 12:00 PM CDT  Procedure - 2.5 hour with U2A ROOM 1  Unit [address]A [facility] ([facility]) [address] [facility] [address]         Care Instructions       Further instructions from your care team       [facility]                        Interventional Cardiology  Discharge Instructions   Post Right Heart Cath      AFTER YOU GO HOME:    DO drink plenty of fluids    DO resume your regular diet and medications unless otherwise instructed by your Primary Physician    Do Not scrub the procedure site vigorously    No lotion or powder to the puncture site for 3 days    CALL YOUR PRIMARY PHYSICIAN IF: You may resume all normal activity.  Monitor neck site for bleeding, swelling, or voice changes. If you notice bleeding or swelling immediately apply pressure to the site and call number below to speak with Cardiology Fellow.  If you experience any changes in your breathing  you should call your doctor immediately or come to the closest Emergency Department.  Do not drive yourself.    ADDITIONAL INSTRUCTIONS: Medications: You are to resume all home medications including anticoagulation therapy unless otherwise advised by your primary cardiologist or nurse coordinator.    Follow Up: Per your primary cardiology team    If you have any questions or concerns regarding your procedure site please call [phone] at anytime and ask for Cardiology Fellow on call.  They are available 24 hours a day.  You may also contact the Cardiology Clinic after hours number at [phone].                                                       Telephone Numbers [phone] Monday-Friday 8:00 am to 4:30 pm    [phone] After 4:30 pm Monday-Friday, Weekends & Holidays  Ask for Interventional Cardiologist on call. Someone is on call 24 hours/day   [facility] toll free number [phone] Monday-Friday 8:00 am to 4:30 pm   [facility] Emergency Dept [phone]                   Additional Information About Your Visit       [employer] Information    [employer] gives you secure access to your electronic health record. If you see a primary care provider, you can also send messages to your care team and make appointments. If you have questions, please call your primary care clinic.  If you do not have a primary care provider, please call [phone] and they will assist you.       Care EveryWhere ID    This is your Care EveryWhere ID. This could be used by other organizations to access your [facility] medical records  [id number]       Your Vitals Were  Most recent update: 8/31/2020  9:27 AM    Blood Pressure   133/58 (BP Location: Left arm)    Pulse   92    Temperature   98.6  F (37  C) (Oral)    Respirations   20    Pulse Oximetry   99%          Primary Care Provider Office Phone # Fax #    Suyapa Hatfield [phone]      Equal Access to Services    MARIBETH CORONADO: Jef singer  Davidali, wamuluda luqadaha, qaybta kaaldebra dallas, joana adamsilya ashok. So [facility] [phone].    ATENCIÓN: Si juventinola nissa, tiene a lainez disposición servicios gratuitos de asistencia lingüística. Damon al [phone].    We comply with applicable federal and state civil rights laws, including the [place] Human Rights Act. We do not discriminate on the basis of race, color, creed, Oriental orthodox, national origin, marital status, age, disability, sex, sexual orientation, or gender identity.       Thank you!    Thank you for choosing [facility] for your care. Our goal is always to provide you with excellent care. Hearing back from our patients is one way we can continue to improve our services. Please take a few minutes to complete the written survey that you may receive in the mail after you visit with us. Thank you!            Medication List      Medications          Morning Afternoon Evening Bedtime As Needed    Alcohol Swabs Pads  INSTRUCTIONS:  Use to swab the area of the injection or sergio as directed   Per insurance coverage                     blood glucose monitoring lancets  INSTRUCTIONS:  Use to test blood sugars 2 times daily.                     blood glucose monitoring meter device kit  INSTRUCTIONS:  Use to test blood sugar 3-4 times daily, as directed.                     * blood glucose test strip  Also known as:  Accu-Chek SmartView  INSTRUCTIONS:  USE TO TEST THREE TIMES DAILY AS DIRECTED  Doctor's comments:  Due for appointment for further refills, please give reminder.                     * blood glucose test strip  Also known as:  NO BRAND SPECIFIED  INSTRUCTIONS:  Use to test blood sugar 2 times daily or as directed.                     * FreeStyle Navjot 14 Day Sensor Misc  INSTRUCTIONS:  Change every 14 days.  Doctor's comments:  Use to check BG 5 times daily.                     * FreeStyle Navjot 14 Day Sensor Misc  INSTRUCTIONS:  Change every 14 days.                     *  insulin pen needle 32G X 4 MM miscellaneous  Also known as:  32G X 4 MM  INSTRUCTIONS:  Use as directed by provider  Per insurance coverage                     * NovoFine 30G X 8 MM miscellaneous  INSTRUCTIONS:  Inject 1 Box Subcutaneous 6 times daily Use 6 pen needles daily or as directed.  Doctor's comments:  Has both Novolog Flex pen and several Humalog Kwik pen, but doesn't have correct needle.  Generic drug:  insulin pen needle                     * B-D U/F 31G X 5 MM miscellaneous  INSTRUCTIONS:  1 Units by Device route 2 times daily Use 2 pen needles daily or as directed.  Generic drug:  insulin pen needle                     order for DME  INSTRUCTIONS:  Auto CPAP 8-15 cmH20                        * This list has 7 medication(s) that are the same as other medications prescribed for you. Read the directions carefully, and ask your doctor or other care provider to review them with you.            ASK your doctor about these medications          Morning Afternoon Evening Bedtime As Needed    acetaminophen 325 MG tablet  Also known as:  TYLENOL  INSTRUCTIONS:  Take 3 tablets (975 mg) by mouth every 8 hours as needed for mild pain                     calcium carbonate 600 mg-vitamin D 400 units 600-400 MG-UNIT per tablet  Also known as:  CALTRATE  INSTRUCTIONS:  Take 1 tablet by mouth 2 times daily (with meals)                     clotrimazole 10 MG lozenge  Also known as:  MYCELEX  INSTRUCTIONS:  Place 1 lozenge inside cheek 4 times daily                     fondaparinux ANTICOAGULANT 7.5MG/0.6ML injection  Also known as:  ARIXTRA  INSTRUCTIONS:  Inject 0.6 mLs (7.5 mg) Subcutaneous every 24 hours Hold dose on the mornings of your right heart catheterization and biopsies                     hydrALAZINE 25 MG tablet  Also known as:  APRESOLINE  INSTRUCTIONS:  Take 3 tablets (75 mg) by mouth every 8 hours                     insulin aspart 100 UNIT/ML pen  Also known as:  NovoLOG PEN  INSTRUCTIONS:  Inject 15  units with each meal. Also inject extra per sliding scale provided in your After Visit Summary from 8/28/20.                     insulin isophane human 100 UNIT/ML injection  Also known as:  HumuLIN N PEN  INSTRUCTIONS:  Inject 35 Units Subcutaneous every morning                     mycophenolate 250 MG capsule  Also known as:  GENERIC EQUIVALENT  INSTRUCTIONS:  Take 6 capsules (1,500 mg) by mouth 2 times daily  Doctor's comments:  TXP DT 7/19/2020 (Heart) TXP Dischg DT  DX Heart replaced by transplant Z94.1 TX [facility] ([place], [place])                     pantoprazole 40 MG EC tablet  Also known as:  PROTONIX  INSTRUCTIONS:  Take 1 tablet (40 mg) by mouth every morning (before breakfast)                     polyethylene glycol 17 g packet  Also known as:  MIRALAX  INSTRUCTIONS:  17 g by Oral or Feeding Tube route daily as needed for constipation                     predniSONE 10 MG tablet  Also known as:  DELTASONE  INSTRUCTIONS:  Take 1.5 tablets (15 mg) by mouth every morning AND 1 tablet (10 mg) every evening.  Doctor's comments:  TXP DT 7/19/2020 (Heart) TXP Dischg DT  DX Heart transplant Z94.1 [facility] ([place], [place])                     rosuvastatin 10 MG tablet  Also known as:  CRESTOR  INSTRUCTIONS:  Take 1 tablet (10 mg) by mouth daily                     senna-docusate 8.6-50 MG tablet  Also known as:  SENOKOT-S/PERICOLACE  INSTRUCTIONS:  Take 1 tablet by mouth 2 times daily as needed for constipation                     sulfamethoxazole-trimethoprim 400-80 MG tablet  Also known as:  BACTRIM  INSTRUCTIONS:  Take 1 tablet by mouth daily  Reason for med:  Preventative Medication Therapy Used Around Surgery                     tacrolimus 1 MG capsule  Also known as:  GENERIC EQUIVALENT  INSTRUCTIONS:  Take 3 capsules (3 mg) by mouth 2 times daily Or as directed by transplant clinic.                     tamsulosin 0.4 MG capsule  Also known as:   FLOMAX  INSTRUCTIONS:  Take 1 capsule (0.4 mg) by mouth daily                     valGANciclovir 450 MG tablet  Also known as:  VALCYTE  INSTRUCTIONS:  Take 1 tablet (450 mg) by mouth every 48 hours  Reason for med:  Medication Treatment to Prevent Cytomegalovirus Disease

## 2020-08-31 NOTE — DISCHARGE INSTRUCTIONS
[facility]                        Interventional Cardiology  Discharge Instructions   Post Right Heart Cath      AFTER YOU GO HOME:    DO drink plenty of fluids    DO resume your regular diet and medications unless otherwise instructed by your Primary Physician    Do Not scrub the procedure site vigorously    No lotion or powder to the puncture site for 3 days    CALL YOUR PRIMARY PHYSICIAN IF: You may resume all normal activity.  Monitor neck site for bleeding, swelling, or voice changes. If you notice bleeding or swelling immediately apply pressure to the site and call number below to speak with Cardiology Fellow.  If you experience any changes in your breathing you should call your doctor immediately or come to the closest Emergency Department.  Do not drive yourself.    ADDITIONAL INSTRUCTIONS: Medications: You are to resume all home medications including anticoagulation therapy unless otherwise advised by your primary cardiologist or nurse coordinator.    Follow Up: Per your primary cardiology team    If you have any questions or concerns regarding your procedure site please call [phone] at anytime and ask for Cardiology Fellow on call.  They are available 24 hours a day.  You may also contact the Cardiology Clinic after hours number at [phone].                                                       Telephone Numbers [phone] Monday-Friday 8:00 am to 4:30 pm    [phone] After 4:30 pm Monday-Friday, Weekends & Holidays  Ask for Interventional Cardiologist on call. Someone is on call 24 hours/day   [facility] toll free number [phone] Monday-Friday 8:00 am to 4:30 pm   [facility] Emergency Dept [phone]

## 2020-08-31 NOTE — PROGRESS NOTES
"Lucian Henderson Sr. is a 64 year old male who is being evaluated via a billable video visit.      The patient has been notified of following:     \"This video visit will be conducted via a call between you and your physician/provider. We have found that certain health care needs can be provided without the need for an in-person physical exam.  This service lets us provide the care you need with a video conversation.  If a prescription is necessary we can send it directly to your pharmacy.  If lab work is needed we can place an order for that and you can then stop by our lab to have the test done at a later time.    Video visits are billed at different rates depending on your insurance coverage.  Please reach out to your insurance provider with any questions.    If during the course of the call the physician/provider feels a video visit is not appropriate, you will not be charged for this service.\"    Patient has given verbal consent for Video visit? Yes    How would you like to obtain your AVS? MyChart     Will anyone else be joining your video visit? No      Video-Visit Details    Type of service:  Video Visit    Video Start Time: 4:33  Video End Time: 4:58 PM    Originating Location (pt. Location): Home    Distant Location (provider location):  [facility] ENDOCRINOLOGY     Liyah Snowden MA    Patients Glucose Data was Obtained via Phone and Located in Table Below         Diabetes Virtual Consult Note  Marisabel Prieto PA-C  September 1, 2020    Lucian Henedrson Sr. is a 64 year old male with a past medical history including  has a past medical history of CAD (coronary artery disease), CHF (congestive heart failure) (H), CKD (chronic kidney disease), stage III (H), Cortical cataract of both eyes, Diabetes (H), Hyperlipidemia, Hypertension, Ischemic cardiomyopathy, Obesity, CHANTEL (obstructive sleep apnea), and Osteoarthritis. He also has no past medical history of Chronic infection, Complication of " anesthesia, COPD (chronic obstructive pulmonary disease) (H), Heart murmur, Irregular heart beat, Liver disease, Other chronic pain, or Uncomplicated asthma.      Recently hospitalized due to high BG.  Hospital discharge notes Increased BS since transplant due to prednisone. Controlled during index admission. Recently increase by NPH as outpatient. No evidence of DKA or HHS on admission. No evidence of infection or acute illness driving hyperglycemia. Negative ketones, UA with glucose. Endocrine consulted and managed. Diabetes education met w patient and his wife - appears elevated BS were due to error in insulin administration. See their note for details. BS controlled day of discharge on NPH 35 units qAM. D/c'ed on this dose and Novolog 15 units with each meal plus sliding scale as following:  Plan:     -  NPH 35 units QAM  -  No NPH at HS  -  Aspart/lispro 15 units per meal (meal target 75 grams carb)     Aspart/lispro correction     -169 give 2 units.  -199 give 4 units.  -229 give 6 units.  -259 give 8 units.  -289 give 10 units.  -319 give 12 units.  -349 give 14 units.  BG >/= 350 give 16 units.    They are giving 35 unit each morning.  They are also giving 15 units Novolog before each meal.  They They have not been giving sliding sliding scale.  They are unable to add 15 plus sliding scale.  They have been giving 35 NPH each morning and 15 with each 75 gram meal.  They have been giving 20 units if blood sugar is >200.        We reviewed glucometer, pump and CGMS data together.  It revealed:    Glucose Readings:    Saturday: 138, 398, 299  Sunday: 91, 231  Monday: 215, 122  Today: 197, 167          History of Diabetes monitoring and complications/ prevention:  CAD: Yes  Last eye exam results: 09/18/2018  Last dental exam: not discussed today.  Microalbuminuria: 6 October 2018  HTN: Yes  On Statin: Yes  On Aspirin: Yes  Depression: No - worried, but happy. Hopeful.     ED: yes, limited concerned at this time     Janice  has a past medical history of CAD (coronary artery disease), CHF (congestive heart failure) (H), CKD (chronic kidney disease), stage III (H), Cortical cataract of both eyes, Diabetes (H), Hyperlipidemia, Hypertension, Ischemic cardiomyopathy, Obesity, CHANTEL (obstructive sleep apnea), and Osteoarthritis. He also has no past medical history of Chronic infection, Complication of anesthesia, COPD (chronic obstructive pulmonary disease) (H), Heart murmur, Irregular heart beat, Liver disease, Other chronic pain, or Uncomplicated asthma.  Current Outpatient Medications   Medication     acetaminophen (TYLENOL) 325 MG tablet     Alcohol Swabs PADS     blood glucose (ACCU-CHEK SMARTVIEW) test strip     blood glucose (NO BRAND SPECIFIED) test strip     blood glucose monitoring (ACCU-CHEK FELIPE SMARTVIEW) meter device kit     blood glucose monitoring (ONE TOUCH DELICA) lancets     calcium carbonate 600 mg-vitamin D 400 units (CALTRATE) 600-400 MG-UNIT per tablet     clotrimazole (MYCELEX) 10 MG lozenge     Continuous Blood Gluc Sensor (FREESTYLE JEREMY 14 DAY SENSOR) MISC     Continuous Blood Gluc Sensor (FREESTYLE JEREMY 14 DAY SENSOR) MISC     fondaparinux ANTICOAGULANT (ARIXTRA) 7.5MG/0.6ML injection     hydrALAZINE (APRESOLINE) 25 MG tablet     insulin aspart (NOVOLOG PEN) 100 UNIT/ML pen     insulin isophane human (HUMULIN N PEN) 100 UNIT/ML injection     insulin pen needle (32G X 4 MM) 32G X 4 MM miscellaneous     insulin pen needle (B-D U/F) 31G X 5 MM miscellaneous     insulin pen needle (NOVOFINE) 30G X 8 MM miscellaneous     order for DME     pantoprazole (PROTONIX) 40 MG EC tablet     polyethylene glycol (MIRALAX) 17 g packet     predniSONE (DELTASONE) 10 MG tablet     rosuvastatin (CRESTOR) 10 MG tablet     senna-docusate (SENOKOT-S/PERICOLACE) 8.6-50 MG tablet     sulfamethoxazole-trimethoprim (BACTRIM) 400-80 MG tablet     tacrolimus (GENERIC EQUIVALENT) 1 MG  "capsule     tamsulosin (FLOMAX) 0.4 MG capsule     valGANciclovir (VALCYTE) 450 MG tablet     mycophenolate (GENERIC EQUIVALENT) 250 MG capsule     No current facility-administered medications for this visit.               Lucian's family history includes Diabetes in his brother, sister, and sister.    ROS:   Patient denies any fevers, chills or sweats as well as any changes in or problems with vision, pain or problems with dentition, new or different headaches.  Patient denies symptoms of hypo and hyperglycemia except as above.   Patients denies marked fatigue, cough, shortness of breath, chest pain or pressure.  There has been no pain with or other changes in urination or  itching or pain in genital areas.  Patient denies any noted swelling in feet, ankles or otherwise, loss of sensation or pain  in feet or other areas.    Patient also denies current difficulties with depressed mood, anhedonia or worrying too much.        Exam:    PHONE VISIT    Lucian is alerted and oriented.  With Shivani.  Shivani asking about how to calculate dose when blood sugar is high.  She takes notes; scratches out add 2,4, 6 on sliding scale to make 17, 19 21 etc..   Mood is \"good,\" affect is congruent.  Thoughtful form and content are fluid and coherent.  No signs of distress are appreciated.      Data:      Most recent:  Lab Results   Component Value Date    CR 1.77 08/31/2020     Lab Results   Component Value Date     08/31/2020       GFR Estimate   Date Value Ref Range Status   08/31/2020 39 (L) >60 mL/min/[1.73_m2] Final     Comment:     Non  GFR Calc  Starting 12/18/2018, serum creatinine based estimated GFR (eGFR) will be   calculated using the Chronic Kidney Disease Epidemiology Collaboration   (CKD-EPI) equation.     08/28/2020 38 (L) >60 mL/min/[1.73_m2] Final     Comment:     Non  GFR Calc  Starting 12/18/2018, serum creatinine based estimated GFR (eGFR) will be   calculated using the " Chronic Kidney Disease Epidemiology Collaboration   (CKD-EPI) equation.     08/27/2020 37 (L) >60 mL/min/[1.73_m2] Final     Comment:     Non  GFR Calc  Starting 12/18/2018, serum creatinine based estimated GFR (eGFR) will be   calculated using the Chronic Kidney Disease Epidemiology Collaboration   (CKD-EPI) equation.       GFR Estimate If Black   Date Value Ref Range Status   08/31/2020 45 (L) >60 mL/min/[1.73_m2] Final     Comment:      GFR Calc  Starting 12/18/2018, serum creatinine based estimated GFR (eGFR) will be   calculated using the Chronic Kidney Disease Epidemiology Collaboration   (CKD-EPI) equation.     08/28/2020 44 (L) >60 mL/min/[1.73_m2] Final     Comment:      GFR Calc  Starting 12/18/2018, serum creatinine based estimated GFR (eGFR) will be   calculated using the Chronic Kidney Disease Epidemiology Collaboration   (CKD-EPI) equation.     08/27/2020 42 (L) >60 mL/min/[1.73_m2] Final     Comment:      GFR Calc  Starting 12/18/2018, serum creatinine based estimated GFR (eGFR) will be   calculated using the Chronic Kidney Disease Epidemiology Collaboration   (CKD-EPI) equation.           Lab Results   Component Value Date    A1C 8.2 (H) 07/03/2020    A1C 7.9 (H) 07/08/2019    A1C 8.5 (H) 03/11/2019    A1C 7.6 (H) 10/16/2018    A1C 9.8 (H) 09/06/2017    HEMOGLOBINA1 8.3 (A) 08/06/2018    HEMOGLOBINA1 10.6 (A) 04/30/2018     Lab Results   Component Value Date    MICROL 6 10/03/2018     No results found for: MICROALBUMIN  No results found for: CPEPT, GADAB, ISCAB  Cholesterol   Date Value Ref Range Status   08/17/2020 118 <200 mg/dL Final   07/08/2019 104 <200 mg/dL Final     HDL Cholesterol   Date Value Ref Range Status   08/17/2020 75 >39 mg/dL Final   07/08/2019 27 (L) >39 mg/dL Final     LDL Cholesterol Calculated   Date Value Ref Range Status   08/17/2020 26 <100 mg/dL Final     Comment:     Desirable:       <100 mg/dl   07/08/2019 41  <100 mg/dL Final     Comment:     Desirable:       <100 mg/dl     Triglycerides   Date Value Ref Range Status   08/17/2020 82 <150 mg/dL Final   07/08/2019 181 (H) <150 mg/dL Final     Comment:     Borderline high:  150-199 mg/dl  High:             200-499 mg/dl  Very high:       >499 mg/dl       Cholesterol/HDL Ratio   Date Value Ref Range Status   06/27/2015 2.6 0.0 - 5.0 Final   01/11/2015 6.0 (H) 0.0 - 5.0 Final                   Assessment/Plan:    Lucian is a 64 year old male with  has a past medical history of CAD (coronary artery disease), CHF (congestive heart failure) (H), CKD (chronic kidney disease), stage III (H), Cortical cataract of both eyes, Diabetes (H), Hyperlipidemia, Hypertension, Ischemic cardiomyopathy, Obesity, CHANTEL (obstructive sleep apnea), and Osteoarthritis. He also has no past medical history of Chronic infection, Complication of anesthesia, COPD (chronic obstructive pulmonary disease) (H), Heart murmur, Irregular heart beat, Liver disease, Other chronic pain, or Uncomplicated asthma.      His type 2 diabetes is uncontrolled now off all oral diabetic medications and exacerbated by steroids, but BG is improved and this should further improve with sliding scale.  Lucian and wife have limited literacy, math skills, so will need to continue examples and ensure we are giving appropriate tools so they they are milly to follow directions.      Doses appear appropriate.  Reviewed multiple times but request schedule with CDE for additional support.  May benefit from in person visit.      >50% of 30 minute visit spent in counseling, education and coordination of care related to healthy lifestyle, prevention of diabetic complications, options for better glycemic control as well as preventing, detecting, and treating hypoglycemia.      It is my privilege to be involved in the care of the above patient.     Marisabel Prieto PA-C, MPAS  [facility]  Diabetes, Endocrinology, and  [facility]  [phone] Appointments/Nurse  [phone] pager  [phone] nurse line    This note was completed in part using Dragon voice recognition, and may contain word and grammatical errors.

## 2020-08-31 NOTE — IP AVS SNAPSHOT
[facility], [facility],  Heart Cath Lab  [address] [facility] [address]  Phone:  [phone]                                    After Visit Summary   8/31/2020    Lucian Henderson     MRN: [id number]           After Visit Summary Signature Page    I have received my discharge instructions, and my questions have been answered. I have discussed any challenges I see with this plan with the nurse or doctor.    ..........................................................................................................................................  Patient/Patient Representative Signature      ..........................................................................................................................................  Patient Representative Print Name and Relationship to Patient    ..................................................               ................................................  Date                                   Time    ..........................................................................................................................................  Reviewed by Signature/Title    ...................................................              ..............................................  Date                                               Time          22EPIC Rev 08/18

## 2020-08-31 NOTE — Clinical Note
dry, intact, no bleeding and no hematoma. RIJV sheath removed, manual pressure held until hemostasis.

## 2020-08-31 NOTE — PROGRESS NOTES
Patient tolerated recovery stage well. VSS, right neck site clean/dry/intact, no hematoma, and denies pain. Teaching was done and discharge instructions were given. Patient discharged from the hospital via wheel chair to home with wife.

## 2020-08-31 NOTE — Clinical Note
Potential access sites were evaluated for patency using ultrasound.   The right jugular vein was selected. Access was obtained under Sonosite and Fluoroscopic guidance using a standard 18 gauge needle with direct visualization of needle entry.

## 2020-09-01 ENCOUNTER — TELEPHONE (OUTPATIENT)
Dept: TRANSPLANT | Facility: CLINIC | Age: 65
End: 2020-09-01

## 2020-09-01 ENCOUNTER — VIRTUAL VISIT (OUTPATIENT)
Dept: ENDOCRINOLOGY | Facility: CLINIC | Age: 65
End: 2020-09-01
Payer: COMMERCIAL

## 2020-09-01 DIAGNOSIS — Z94.1 HEART TRANSPLANT, ORTHOTOPIC, STATUS (H): ICD-10-CM

## 2020-09-01 DIAGNOSIS — E11.21 TYPE 2 DIABETES MELLITUS WITH DIABETIC NEPHROPATHY, WITH LONG-TERM CURRENT USE OF INSULIN (H): Chronic | ICD-10-CM

## 2020-09-01 DIAGNOSIS — Z79.4 TYPE 2 DIABETES MELLITUS WITH DIABETIC NEPHROPATHY, WITH LONG-TERM CURRENT USE OF INSULIN (H): Chronic | ICD-10-CM

## 2020-09-01 DIAGNOSIS — E11.65 TYPE 2 DIABETES MELLITUS WITH HYPERGLYCEMIA, WITH LONG-TERM CURRENT USE OF INSULIN (H): ICD-10-CM

## 2020-09-01 DIAGNOSIS — Z79.4 TYPE 2 DIABETES MELLITUS WITH HYPERGLYCEMIA, WITH LONG-TERM CURRENT USE OF INSULIN (H): ICD-10-CM

## 2020-09-01 LAB — COPATH REPORT: NORMAL

## 2020-09-01 RX ORDER — PREDNISONE 10 MG/1
TABLET ORAL
Qty: 180 TABLET | Refills: 3 | Status: SHIPPED | OUTPATIENT
Start: 2020-09-01 | End: 2020-09-16

## 2020-09-01 RX ORDER — MYCOPHENOLATE MOFETIL 250 MG/1
1500 CAPSULE ORAL 2 TIMES DAILY
Qty: 360 CAPSULE | Refills: 3 | Status: SHIPPED | OUTPATIENT
Start: 2020-09-01 | End: 2020-09-01

## 2020-09-01 RX ORDER — MYCOPHENOLATE MOFETIL 250 MG/1
1500 CAPSULE ORAL 2 TIMES DAILY
Qty: 360 CAPSULE | Refills: 3 | Status: SHIPPED | OUTPATIENT
Start: 2020-09-01 | End: 2020-09-02

## 2020-09-01 NOTE — TELEPHONE ENCOUNTER
I tried to schedule an admission visit with Pt this morning and he is declining homecare services stating he doesn't feel he needs it as well as he states he is going out to the gym and running other errands.  Seeing that he is going out on errands and his payor is a Medicare supplement, he is not homebound and not going to qualify for homecare. Feel free to contact me with any questions.    Gordy Osborn, CURRYN  [phone]

## 2020-09-01 NOTE — LETTER
"9/1/2020       RE: Lucian Henderson Sr.  [address] [facility] [address]     Dear Colleague,    Thank you for referring your patient, Lucian Henderson Sr., to the [facility] ENDOCRINOLOGY at [facility]. Please see a copy of my visit note below.    Lucian Henderson Sr. is a 64 year old male who is being evaluated via a billable video visit.      The patient has been notified of following:     \"This video visit will be conducted via a call between you and your physician/provider. We have found that certain health care needs can be provided without the need for an in-person physical exam.  This service lets us provide the care you need with a video conversation.  If a prescription is necessary we can send it directly to your pharmacy.  If lab work is needed we can place an order for that and you can then stop by our lab to have the test done at a later time.    Video visits are billed at different rates depending on your insurance coverage.  Please reach out to your insurance provider with any questions.    If during the course of the call the physician/provider feels a video visit is not appropriate, you will not be charged for this service.\"    Patient has given verbal consent for Video visit? Yes    How would you like to obtain your AVS? MyChart     Will anyone else be joining your video visit? No      Video-Visit Details    Type of service:  Video Visit    Video Start Time: 4:33  Video End Time: 4:58 PM    Originating Location (pt. Location): Home    Distant Location (provider location):  [facility] ENDOCRINOLOGY     Liyah Snowden MA    Patients Glucose Data was Obtained via Phone and Located in Table Below         Diabetes Virtual Consult Note  Marisabel Prieto PA-C  September 1, 2020    Lucian Henderson Sr. is a 64 year old male with a past medical history including  has a past medical history of CAD (coronary artery disease), CHF " (congestive heart failure) (H), CKD (chronic kidney disease), stage III (H), Cortical cataract of both eyes, Diabetes (H), Hyperlipidemia, Hypertension, Ischemic cardiomyopathy, Obesity, CHANTEL (obstructive sleep apnea), and Osteoarthritis. He also has no past medical history of Chronic infection, Complication of anesthesia, COPD (chronic obstructive pulmonary disease) (H), Heart murmur, Irregular heart beat, Liver disease, Other chronic pain, or Uncomplicated asthma.      Recently hospitalized due to high BG.  Hospital discharge notes Increased BS since transplant due to prednisone. Controlled during index admission. Recently increase by NPH as outpatient. No evidence of DKA or HHS on admission. No evidence of infection or acute illness driving hyperglycemia. Negative ketones, UA with glucose. Endocrine consulted and managed. Diabetes education met w patient and his wife - appears elevated BS were due to error in insulin administration. See their note for details. BS controlled day of discharge on NPH 35 units qAM. D/c'ed on this dose and Novolog 15 units with each meal plus sliding scale as following:  Plan:     -  NPH 35 units QAM  -  No NPH at HS  -  Aspart/lispro 15 units per meal (meal target 75 grams carb)     Aspart/lispro correction     -169 give 2 units.  -199 give 4 units.  -229 give 6 units.  -259 give 8 units.  -289 give 10 units.  -319 give 12 units.  -349 give 14 units.  BG >/= 350 give 16 units.    They are giving 35 unit each morning.  They are also giving 15 units Novolog before each meal.  They They have not been giving sliding sliding scale.  They are unable to add 15 plus sliding scale.  They have been giving 35 NPH each morning and 15 with each 75 gram meal.  They have been giving 20 units if blood sugar is >200.        We reviewed glucometer, pump and CGMS data together.  It revealed:    Glucose Readings:    Saturday: 138, 398, 299  Sunday: 91,  231  Monday: 215, 122  Today: 197, 167          History of Diabetes monitoring and complications/ prevention:  CAD: Yes  Last eye exam results: 09/18/2018  Last dental exam: not discussed today.  Microalbuminuria: 6 October 2018  HTN: Yes  On Statin: Yes  On Aspirin: Yes  Depression: No - worried, but happy. Hopeful.    ED: yes, limited concerned at this time     Janice  has a past medical history of CAD (coronary artery disease), CHF (congestive heart failure) (H), CKD (chronic kidney disease), stage III (H), Cortical cataract of both eyes, Diabetes (H), Hyperlipidemia, Hypertension, Ischemic cardiomyopathy, Obesity, CHANTEL (obstructive sleep apnea), and Osteoarthritis. He also has no past medical history of Chronic infection, Complication of anesthesia, COPD (chronic obstructive pulmonary disease) (H), Heart murmur, Irregular heart beat, Liver disease, Other chronic pain, or Uncomplicated asthma.  Current Outpatient Medications   Medication     acetaminophen (TYLENOL) 325 MG tablet     Alcohol Swabs PADS     blood glucose (ACCU-CHEK SMARTVIEW) test strip     blood glucose (NO BRAND SPECIFIED) test strip     blood glucose monitoring (ACCU-CHEK FELIPE SMARTVIEW) meter device kit     blood glucose monitoring (ONE TOUCH DELICA) lancets     calcium carbonate 600 mg-vitamin D 400 units (CALTRATE) 600-400 MG-UNIT per tablet     clotrimazole (MYCELEX) 10 MG lozenge     Continuous Blood Gluc Sensor (FREESTYLE JEREMY 14 DAY SENSOR) MISC     Continuous Blood Gluc Sensor (FREESTYLE JEREMY 14 DAY SENSOR) MISC     fondaparinux ANTICOAGULANT (ARIXTRA) 7.5MG/0.6ML injection     hydrALAZINE (APRESOLINE) 25 MG tablet     insulin aspart (NOVOLOG PEN) 100 UNIT/ML pen     insulin isophane human (HUMULIN N PEN) 100 UNIT/ML injection     insulin pen needle (32G X 4 MM) 32G X 4 MM miscellaneous     insulin pen needle (B-D U/F) 31G X 5 MM miscellaneous     insulin pen needle (NOVOFINE) 30G X 8 MM miscellaneous     order for DME      "pantoprazole (PROTONIX) 40 MG EC tablet     polyethylene glycol (MIRALAX) 17 g packet     predniSONE (DELTASONE) 10 MG tablet     rosuvastatin (CRESTOR) 10 MG tablet     senna-docusate (SENOKOT-S/PERICOLACE) 8.6-50 MG tablet     sulfamethoxazole-trimethoprim (BACTRIM) 400-80 MG tablet     tacrolimus (GENERIC EQUIVALENT) 1 MG capsule     tamsulosin (FLOMAX) 0.4 MG capsule     valGANciclovir (VALCYTE) 450 MG tablet     mycophenolate (GENERIC EQUIVALENT) 250 MG capsule     No current facility-administered medications for this visit.               Lucian's family history includes Diabetes in his brother, sister, and sister.    ROS:   Patient denies any fevers, chills or sweats as well as any changes in or problems with vision, pain or problems with dentition, new or different headaches.  Patient denies symptoms of hypo and hyperglycemia except as above.   Patients denies marked fatigue, cough, shortness of breath, chest pain or pressure.  There has been no pain with or other changes in urination or  itching or pain in genital areas.  Patient denies any noted swelling in feet, ankles or otherwise, loss of sensation or pain  in feet or other areas.    Patient also denies current difficulties with depressed mood, anhedonia or worrying too much.        Exam:    PHONE VISIT    Lucian is alerted and oriented.  With Shivani.  Shivani asking about how to calculate dose when blood sugar is high.  She takes notes; scratches out add 2,4, 6 on sliding scale to make 17, 19 21 etc..   Mood is \"good,\" affect is congruent.  Thoughtful form and content are fluid and coherent.  No signs of distress are appreciated.      Data:      Most recent:  Lab Results   Component Value Date    CR 1.77 08/31/2020     Lab Results   Component Value Date     08/31/2020       GFR Estimate   Date Value Ref Range Status   08/31/2020 39 (L) >60 mL/min/[1.73_m2] Final     Comment:     Non  GFR Calc  Starting 12/18/2018, serum creatinine " based estimated GFR (eGFR) will be   calculated using the Chronic Kidney Disease Epidemiology Collaboration   (CKD-EPI) equation.     08/28/2020 38 (L) >60 mL/min/[1.73_m2] Final     Comment:     Non  GFR Calc  Starting 12/18/2018, serum creatinine based estimated GFR (eGFR) will be   calculated using the Chronic Kidney Disease Epidemiology Collaboration   (CKD-EPI) equation.     08/27/2020 37 (L) >60 mL/min/[1.73_m2] Final     Comment:     Non  GFR Calc  Starting 12/18/2018, serum creatinine based estimated GFR (eGFR) will be   calculated using the Chronic Kidney Disease Epidemiology Collaboration   (CKD-EPI) equation.       GFR Estimate If Black   Date Value Ref Range Status   08/31/2020 45 (L) >60 mL/min/[1.73_m2] Final     Comment:      GFR Calc  Starting 12/18/2018, serum creatinine based estimated GFR (eGFR) will be   calculated using the Chronic Kidney Disease Epidemiology Collaboration   (CKD-EPI) equation.     08/28/2020 44 (L) >60 mL/min/[1.73_m2] Final     Comment:      GFR Calc  Starting 12/18/2018, serum creatinine based estimated GFR (eGFR) will be   calculated using the Chronic Kidney Disease Epidemiology Collaboration   (CKD-EPI) equation.     08/27/2020 42 (L) >60 mL/min/[1.73_m2] Final     Comment:      GFR Calc  Starting 12/18/2018, serum creatinine based estimated GFR (eGFR) will be   calculated using the Chronic Kidney Disease Epidemiology Collaboration   (CKD-EPI) equation.           Lab Results   Component Value Date    A1C 8.2 (H) 07/03/2020    A1C 7.9 (H) 07/08/2019    A1C 8.5 (H) 03/11/2019    A1C 7.6 (H) 10/16/2018    A1C 9.8 (H) 09/06/2017    HEMOGLOBINA1 8.3 (A) 08/06/2018    HEMOGLOBINA1 10.6 (A) 04/30/2018     Lab Results   Component Value Date    MICROL 6 10/03/2018     No results found for: MICROALBUMIN  No results found for: CPEPT, GADAB, ISCAB  Cholesterol   Date Value Ref Range Status   08/17/2020 118  <200 mg/dL Final   07/08/2019 104 <200 mg/dL Final     HDL Cholesterol   Date Value Ref Range Status   08/17/2020 75 >39 mg/dL Final   07/08/2019 27 (L) >39 mg/dL Final     LDL Cholesterol Calculated   Date Value Ref Range Status   08/17/2020 26 <100 mg/dL Final     Comment:     Desirable:       <100 mg/dl   07/08/2019 41 <100 mg/dL Final     Comment:     Desirable:       <100 mg/dl     Triglycerides   Date Value Ref Range Status   08/17/2020 82 <150 mg/dL Final   07/08/2019 181 (H) <150 mg/dL Final     Comment:     Borderline high:  150-199 mg/dl  High:             200-499 mg/dl  Very high:       >499 mg/dl       Cholesterol/HDL Ratio   Date Value Ref Range Status   06/27/2015 2.6 0.0 - 5.0 Final   01/11/2015 6.0 (H) 0.0 - 5.0 Final                   Assessment/Plan:    Lucian is a 64 year old male with  has a past medical history of CAD (coronary artery disease), CHF (congestive heart failure) (H), CKD (chronic kidney disease), stage III (H), Cortical cataract of both eyes, Diabetes (H), Hyperlipidemia, Hypertension, Ischemic cardiomyopathy, Obesity, CHANTEL (obstructive sleep apnea), and Osteoarthritis. He also has no past medical history of Chronic infection, Complication of anesthesia, COPD (chronic obstructive pulmonary disease) (H), Heart murmur, Irregular heart beat, Liver disease, Other chronic pain, or Uncomplicated asthma.      His type 2 diabetes is uncontrolled now off all oral diabetic medications and exacerbated by steroids, but BG is improved and this should further improve with sliding scale.  Lucian and wife have limited literacy, math skills, so will need to continue examples and ensure we are giving appropriate tools so they they are milly to follow directions.      Doses appear appropriate.  Reviewed multiple times but request schedule with CDE for additional support.  May benefit from in person visit.      >50% of 30 minute visit spent in counseling, education and coordination of care related to  healthy lifestyle, prevention of diabetic complications, options for better glycemic control as well as preventing, detecting, and treating hypoglycemia.      It is my privilege to be involved in the care of the above patient.     Marisabel Prieto PA-C, MPAS  [facility]  Diabetes, Endocrinology, and Metabolism  [phone] Appointments/Nurse  [phone] pager  [phone] nurse line    This note was completed in part using Dragon voice recognition, and may contain word and grammatical errors.

## 2020-09-01 NOTE — TELEPHONE ENCOUNTER
Pt with negative heart biopsy. Pt called using .  Pt to decrease Prednisone dose to 10mg BID.  Pt and wife state understanding instructions.    Shivani, pt's wife, states he needs refills of several medications.  Some confusion as to which medication needs refills.  Pt is almost out of MMF- refill sent to [facility] near pt's home.  Spoke with [facility]- they plan to call pt in the AM to review medications and fill the meds that are needed.

## 2020-09-01 NOTE — TELEPHONE ENCOUNTER
Called pt using  to discuss results of yesterday's labs.  Tacrolimus level 9.6.  Goal 8-10.  Pt to continue 3mg BID dose until next visit.  Reminded pt to wear his leg wraps during the day to help with his LE edema- he states he is wearing them now.  Pt states BG was in the 180's this AM- pt has a visit with endocrine this afternoon.  Will follow up with pt when biopsy results are available.  Pt states understanding plan of care and instructions.

## 2020-09-02 DIAGNOSIS — Z79.4 TYPE 2 DIABETES MELLITUS WITH HYPERGLYCEMIA, WITH LONG-TERM CURRENT USE OF INSULIN (H): ICD-10-CM

## 2020-09-02 DIAGNOSIS — E11.65 TYPE 2 DIABETES MELLITUS WITH HYPERGLYCEMIA, WITH LONG-TERM CURRENT USE OF INSULIN (H): ICD-10-CM

## 2020-09-02 DIAGNOSIS — Z94.1 HEART TRANSPLANT, ORTHOTOPIC, STATUS (H): ICD-10-CM

## 2020-09-02 RX ORDER — MYCOPHENOLATE MOFETIL 250 MG/1
CAPSULE ORAL
Qty: 1080 CAPSULE | Refills: 3 | Status: ON HOLD | OUTPATIENT
Start: 2020-09-02 | End: 2020-10-05

## 2020-09-02 RX ORDER — ACETAMINOPHEN 325 MG/1
975 TABLET ORAL EVERY 8 HOURS PRN
Qty: 60 TABLET | Refills: 3 | Status: SHIPPED | OUTPATIENT
Start: 2020-09-02 | End: 2020-09-02

## 2020-09-02 RX ORDER — ACETAMINOPHEN 325 MG/1
975 TABLET ORAL EVERY 8 HOURS PRN
Qty: 60 TABLET | Refills: 3 | Status: SHIPPED | OUTPATIENT
Start: 2020-09-02

## 2020-09-02 NOTE — PATIENT INSTRUCTIONS
Plan para diabetes:     Humulin N 35 unidades cada manana      - - - - - - - -     Con las comidas:       Novolog 15 - 31 unidades con la comida de acuerdo con el azucar antes de comer.  Novolog 15 - 31 units every 4-6 hours BEFORE meals, depending on blood glucose (BG) BEFORE meals.    Para glucosa < 140 antes de comer: 15 unidades Novolog con la comida.    Para -169 give 17 units antes de comer.  -199 give 19 units.  -229 give 21 units.  -259 give 23 units.  -289 give 25 units.  -319 give 27 units.  -349 give 29 units.  BG >/= 350 give 31 units.      Si el azucar sigue >300 o si hay <70, por favor llamen ras hernandezza:    My best wishes,    Marisabel Prieto PA-C, [facility]  [facility]  Diabetes, Endocrinology, and Metabolism  [phone] Appointments/Nurse  [phone] Fax  [phone] URGENTafter hours/weekend Endocrinologist on call

## 2020-09-04 ENCOUNTER — TELEPHONE (OUTPATIENT)
Dept: TRANSPLANT | Facility: CLINIC | Age: 65
End: 2020-09-04

## 2020-09-04 ENCOUNTER — HOSPITAL ENCOUNTER (OUTPATIENT)
Dept: CARDIAC REHAB | Facility: CLINIC | Age: 65
End: 2020-09-04
Attending: SURGERY
Payer: COMMERCIAL

## 2020-09-04 DIAGNOSIS — Z94.1 HEART TRANSPLANT, ORTHOTOPIC, STATUS (H): Primary | ICD-10-CM

## 2020-09-04 PROCEDURE — 40000116 ZZH STATISTIC OP CR VISIT

## 2020-09-04 PROCEDURE — 93798 PHYS/QHP OP CAR RHAB W/ECG: CPT

## 2020-09-08 ENCOUNTER — APPOINTMENT (OUTPATIENT)
Dept: INTERPRETER SERVICES | Facility: CLINIC | Age: 65
End: 2020-09-08
Payer: COMMERCIAL

## 2020-09-09 ENCOUNTER — APPOINTMENT (OUTPATIENT)
Dept: INTERPRETER SERVICES | Facility: CLINIC | Age: 65
End: 2020-09-09
Payer: COMMERCIAL

## 2020-09-09 ENCOUNTER — PRE VISIT (OUTPATIENT)
Dept: TRANSPLANT | Facility: CLINIC | Age: 65
End: 2020-09-09

## 2020-09-09 ENCOUNTER — TELEPHONE (OUTPATIENT)
Dept: TRANSPLANT | Facility: CLINIC | Age: 65
End: 2020-09-09

## 2020-09-09 DIAGNOSIS — Z94.1 HEART TRANSPLANT, ORTHOTOPIC, STATUS (H): Primary | ICD-10-CM

## 2020-09-09 RX ORDER — LIDOCAINE 40 MG/G
CREAM TOPICAL
Status: CANCELLED | OUTPATIENT
Start: 2020-09-09

## 2020-09-09 NOTE — TELEPHONE ENCOUNTER
"Called pt to get updated BG and weight.  Pt states his weight has been stable since discharge from hospital- 174lb today.  Pt states BG is OK, but \"could be better\".   this afternoon. Pt to have review of medications with Pharm D tomorrow.  Pt and wife state they have enough of all of their medications at this time.  Pt has biopsy on 9/14/20, reviewed times and prep with patient.  Call completed using a .  "

## 2020-09-10 ENCOUNTER — APPOINTMENT (OUTPATIENT)
Dept: INTERPRETER SERVICES | Facility: CLINIC | Age: 65
End: 2020-09-10
Payer: COMMERCIAL

## 2020-09-11 ENCOUNTER — DOCUMENTATION ONLY (OUTPATIENT)
Dept: ENDOCRINOLOGY | Facility: CLINIC | Age: 65
End: 2020-09-11

## 2020-09-11 ENCOUNTER — TELEPHONE (OUTPATIENT)
Dept: CARDIOLOGY | Facility: CLINIC | Age: 65
End: 2020-09-11

## 2020-09-11 NOTE — PROGRESS NOTES
Order placed for [employer] system. Can take up to 2 weeks to verify benefits, get 20% payment from pt and get order shipped. Will follow up in a few days.

## 2020-09-11 NOTE — TELEPHONE ENCOUNTER
Call complete via  for pre procedure reminder, travel screen and updated visitor policy.  Per heart transplant criteria, Covid test not done.

## 2020-09-14 ENCOUNTER — HOSPITAL ENCOUNTER (OUTPATIENT)
Facility: CLINIC | Age: 65
Discharge: HOME | End: 2020-09-14
Attending: INTERNAL MEDICINE | Admitting: INTERNAL MEDICINE
Payer: COMMERCIAL

## 2020-09-14 ENCOUNTER — APPOINTMENT (OUTPATIENT)
Dept: MEDSURG UNIT | Facility: CLINIC | Age: 65
End: 2020-09-14
Attending: INTERNAL MEDICINE
Payer: COMMERCIAL

## 2020-09-14 ENCOUNTER — APPOINTMENT (OUTPATIENT)
Dept: INTERPRETER SERVICES | Facility: CLINIC | Age: 65
End: 2020-09-14
Payer: COMMERCIAL

## 2020-09-14 ENCOUNTER — APPOINTMENT (OUTPATIENT)
Dept: LAB | Facility: CLINIC | Age: 65
End: 2020-09-14
Attending: INTERNAL MEDICINE
Payer: COMMERCIAL

## 2020-09-14 VITALS
TEMPERATURE: 98.1 F | SYSTOLIC BLOOD PRESSURE: 135 MMHG | RESPIRATION RATE: 18 BRPM | OXYGEN SATURATION: 99 % | DIASTOLIC BLOOD PRESSURE: 70 MMHG | HEART RATE: 95 BPM

## 2020-09-14 DIAGNOSIS — Z94.1 HEART REPLACED BY TRANSPLANT (H): Primary | ICD-10-CM

## 2020-09-14 DIAGNOSIS — Z94.1 HEART TRANSPLANT, ORTHOTOPIC, STATUS (H): ICD-10-CM

## 2020-09-14 DIAGNOSIS — Z94.1 HEART REPLACED BY TRANSPLANT (H): ICD-10-CM

## 2020-09-14 LAB
ALBUMIN SERPL-MCNC: 2.7 G/DL (ref 3.4–5)
ALP SERPL-CCNC: 100 U/L (ref 40–150)
ALT SERPL W P-5'-P-CCNC: 26 U/L (ref 0–70)
ANION GAP SERPL CALCULATED.3IONS-SCNC: 7 MMOL/L (ref 3–14)
AST SERPL W P-5'-P-CCNC: 19 U/L (ref 0–45)
BILIRUB SERPL-MCNC: 0.3 MG/DL (ref 0.2–1.3)
BUN SERPL-MCNC: 47 MG/DL (ref 7–30)
CALCIUM SERPL-MCNC: 7.8 MG/DL (ref 8.5–10.1)
CHLORIDE SERPL-SCNC: 106 MMOL/L (ref 94–109)
CO2 SERPL-SCNC: 22 MMOL/L (ref 20–32)
CREAT SERPL-MCNC: 1.3 MG/DL (ref 0.66–1.25)
ERYTHROCYTE [DISTWIDTH] IN BLOOD BY AUTOMATED COUNT: 20 % (ref 10–15)
GFR SERPL CREATININE-BSD FRML MDRD: 56 ML/MIN/{1.73_M2}
GLUCOSE SERPL-MCNC: 111 MG/DL (ref 70–99)
HCT VFR BLD AUTO: 25.7 % (ref 40–53)
HGB BLD-MCNC: 7.9 G/DL (ref 13.3–17.7)
MAGNESIUM SERPL-MCNC: 1.3 MG/DL (ref 1.6–2.3)
MCH RBC QN AUTO: 30.5 PG (ref 26.5–33)
MCHC RBC AUTO-ENTMCNC: 30.7 G/DL (ref 31.5–36.5)
MCV RBC AUTO: 99 FL (ref 78–100)
PHOSPHATE SERPL-MCNC: 2.2 MG/DL (ref 2.5–4.5)
PLATELET # BLD AUTO: 199 10E9/L (ref 150–450)
POTASSIUM SERPL-SCNC: 4.1 MMOL/L (ref 3.4–5.3)
PROT SERPL-MCNC: 5.6 G/DL (ref 6.8–8.8)
RBC # BLD AUTO: 2.59 10E12/L (ref 4.4–5.9)
SODIUM SERPL-SCNC: 135 MMOL/L (ref 133–144)
TACROLIMUS BLD-MCNC: 3.4 UG/L (ref 5–15)
TME LAST DOSE: ABNORMAL H
WBC # BLD AUTO: 4 10E9/L (ref 4–11)

## 2020-09-14 PROCEDURE — 27210794 ZZH OR GENERAL SUPPLY STERILE: Performed by: INTERNAL MEDICINE

## 2020-09-14 PROCEDURE — 25000125 ZZHC RX 250: Performed by: INTERNAL MEDICINE

## 2020-09-14 PROCEDURE — 93505 ENDOMYOCARDIAL BIOPSY: CPT | Performed by: INTERNAL MEDICINE

## 2020-09-14 PROCEDURE — 93505 ENDOMYOCARDIAL BIOPSY: CPT | Mod: 26 | Performed by: INTERNAL MEDICINE

## 2020-09-14 PROCEDURE — 99214 OFFICE O/P EST MOD 30 MIN: CPT | Mod: 24 | Performed by: NURSE PRACTITIONER

## 2020-09-14 PROCEDURE — 88307 TISSUE EXAM BY PATHOLOGIST: CPT | Performed by: INTERNAL MEDICINE

## 2020-09-14 PROCEDURE — 83735 ASSAY OF MAGNESIUM: CPT | Performed by: INTERNAL MEDICINE

## 2020-09-14 PROCEDURE — 36415 COLL VENOUS BLD VENIPUNCTURE: CPT | Performed by: INTERNAL MEDICINE

## 2020-09-14 PROCEDURE — 40000556 ZZH STATISTIC PERIPHERAL IV START W US GUIDANCE

## 2020-09-14 PROCEDURE — 80197 ASSAY OF TACROLIMUS: CPT | Performed by: INTERNAL MEDICINE

## 2020-09-14 PROCEDURE — C1894 INTRO/SHEATH, NON-LASER: HCPCS | Performed by: INTERNAL MEDICINE

## 2020-09-14 PROCEDURE — 25000128 H RX IP 250 OP 636: Performed by: NURSE PRACTITIONER

## 2020-09-14 PROCEDURE — 85027 COMPLETE CBC AUTOMATED: CPT | Performed by: INTERNAL MEDICINE

## 2020-09-14 PROCEDURE — 84100 ASSAY OF PHOSPHORUS: CPT | Performed by: INTERNAL MEDICINE

## 2020-09-14 PROCEDURE — 88350 IMFLUOR EA ADDL 1ANTB STN PX: CPT | Performed by: INTERNAL MEDICINE

## 2020-09-14 PROCEDURE — 80053 COMPREHEN METABOLIC PANEL: CPT | Performed by: INTERNAL MEDICINE

## 2020-09-14 PROCEDURE — 88346 IMFLUOR 1ST 1ANTB STAIN PX: CPT | Performed by: INTERNAL MEDICINE

## 2020-09-14 PROCEDURE — 96365 THER/PROPH/DIAG IV INF INIT: CPT

## 2020-09-14 PROCEDURE — 36592 COLLECT BLOOD FROM PICC: CPT

## 2020-09-14 PROCEDURE — 40000166 ZZH STATISTIC PP CARE STAGE 1

## 2020-09-14 RX ORDER — LIDOCAINE 40 MG/G
CREAM TOPICAL
Status: DISCONTINUED | OUTPATIENT
Start: 2020-09-14 | End: 2020-09-14 | Stop reason: HOSPADM

## 2020-09-14 RX ORDER — MAGNESIUM SULFATE HEPTAHYDRATE 40 MG/ML
2 INJECTION, SOLUTION INTRAVENOUS ONCE
Status: COMPLETED | OUTPATIENT
Start: 2020-09-14 | End: 2020-09-14

## 2020-09-14 RX ORDER — CALCIUM CARBONATE/VITAMIN D3 500-10/5ML
400 LIQUID (ML) ORAL DAILY
Qty: 30 CAPSULE | Refills: 1 | Status: SHIPPED | OUTPATIENT
Start: 2020-09-14 | End: 2020-09-22

## 2020-09-14 RX ORDER — MAGNESIUM OXIDE 400 MG/1
800 TABLET ORAL ONCE
Status: DISCONTINUED | OUTPATIENT
Start: 2020-09-14 | End: 2020-09-14

## 2020-09-14 RX ORDER — LIDOCAINE 40 MG/G
CREAM TOPICAL
Status: COMPLETED | OUTPATIENT
Start: 2020-09-14 | End: 2020-09-14

## 2020-09-14 RX ORDER — MAGNESIUM SULFATE HEPTAHYDRATE 500 MG/ML
2 INJECTION, SOLUTION INTRAMUSCULAR; INTRAVENOUS ONCE
Status: DISCONTINUED | OUTPATIENT
Start: 2020-09-14 | End: 2020-09-14 | Stop reason: HOSPADM

## 2020-09-14 RX ADMIN — LIDOCAINE: 40 CREAM TOPICAL at 13:06

## 2020-09-14 RX ADMIN — MAGNESIUM SULFATE 2 G: 2 INJECTION INTRAVENOUS at 14:35

## 2020-09-14 NOTE — PROGRESS NOTES
09/07/2020  216, 199, 200    09/08/2020  186, 175, 180    09/09/2020  268, 188, 170    09/10/2020  218, 190, 178    09/11/2020  168, 187, 190    09/12/2020  205, 195, 180    09/13/2020  98, 120, 130    09/14/2020  190, 180, 185    09/15/2020  217

## 2020-09-14 NOTE — DISCHARGE INSTRUCTIONS
[facility]                        Interventional Cardiology  Discharge Instructions   Post  Heart BIOPSY      AFTER YOU GO HOME:    DO drink plenty of fluids    DO resume your regular diet and medications unless otherwise instructed by your Primary Physician    Do Not scrub the procedure site vigorously    No lotion or powder to the puncture site for 3 days    CALL YOUR PRIMARY PHYSICIAN IF: You may resume all normal activity.  Monitor neck site for bleeding, swelling, or voice changes. If you notice bleeding or swelling immediately apply pressure to the site and call number below to speak with Cardiology Fellow.  If you experience any changes in your breathing you should call your doctor immediately or come to the closest Emergency Department.  Do not drive yourself.    ADDITIONAL INSTRUCTIONS: Medications: You are to resume all home medications including anticoagulation therapy unless otherwise advised by your primary cardiologist or nurse coordinator.    Follow Up: Per your primary cardiology team    If you have any questions or concerns regarding your procedure site please call [phone] at anytime and ask for Cardiology Fellow on call.  They are available 24 hours a day.  You may also contact the Cardiology Clinic after hours number at [phone].                                                       Telephone Numbers [phone] Monday-Friday 8:00 am to 4:30 pm    [phone] After 4:30 pm Monday-Friday, Weekends & Holidays  Ask for Interventional Cardiologist on call. Someone is on call 24 hours/day   [facility] toll free number [phone] Monday-Friday 8:00 am to 4:30 pm   [facility] Emergency Dept [phone]

## 2020-09-14 NOTE — PROGRESS NOTES
"Lucian Henderson Sr. is a 64 year old male who is being evaluated via a billable video visit.      The patient has been notified of following:     \"This video visit will be conducted via a call between you and your physician/provider. We have found that certain health care needs can be provided without the need for an in-person physical exam.  This service lets us provide the care you need with a video conversation.  If a prescription is necessary we can send it directly to your pharmacy.  If lab work is needed we can place an order for that and you can then stop by our lab to have the test done at a later time.    Video visits are billed at different rates depending on your insurance coverage.  Please reach out to your insurance provider with any questions.    If during the course of the call the physician/provider feels a video visit is not appropriate, you will not be charged for this service.\"    Patient has given verbal consent for Video visit? Yes  How would you like to obtain your AVS? MyChart  Will anyone else be joining your video visit? No    Video-Visit Details    Type of service:  Video Visit    Video Start Time: 11:06 AM  Video End Time: 11:25 AM    Originating Location (pt. Location): Home    Distant Location (provider location):  [facility] ENDOCRINOLOGY     Liyah Snowden MA            Diabetes Virtual Consult Note  Marisabel Prieto PA-C  September 1, 2020    Lucian Henderson Sr. is a 64 year old male with a past medical history including  has a past medical history of CAD (coronary artery disease), CHF (congestive heart failure) (H), CKD (chronic kidney disease), stage III (H), Cortical cataract of both eyes, Diabetes (H), Hyperlipidemia, Hypertension, Ischemic cardiomyopathy, Obesity, CHANTEL (obstructive sleep apnea), and Osteoarthritis. He also has no past medical history of Chronic infection, Complication of anesthesia, COPD (chronic obstructive pulmonary disease) (H), Heart murmur, " Irregular heart beat, Liver disease, Other chronic pain, or Uncomplicated asthma.      Recently hospitalized due to high BG.  Hospital discharge notes Increased BS since transplant due to prednisone. Controlled during index admission. Recently increase by NPH as outpatient. No evidence of DKA or HHS on admission. No evidence of infection or acute illness driving hyperglycemia. Negative ketones, UA with glucose. Endocrine consulted and managed. Diabetes education met w patient and his wife - appears elevated BS were due to error in insulin administration. See their note for details. BS controlled day of discharge on NPH 35 units qAM. D/c'ed on this dose and Novolog 15 units with each meal plus sliding scale as following:  Plan:     -  NPH 35 units QAM  -  No NPH at HS  -  Aspart/lispro 15 units per meal (meal target 75 grams carb) + 1u :30     Aspart/lispro correction     Para glucosa < 140 antes de comer: 15 unidades Novolog con la comida.     Para -169 give 17 units antes de comer.  -199 give 19 units.  -229 give 21 units.  -259 give 23 units.  -289 give 25 units.  -319 give 27 units.  -349 give 29 units.  BG >/= 350 give 31 units.    They are giving 35 unit each morning.  They are also giving 15 21 units Novolog before each meal with sliding scale but are worried blood glucose is high, particularly in the morning,  The do not understand why as Lucian tells me dinner is his smallest meal.  He has just bread and coffee.  They are worried it is because he has none of his DM pills.     Otherwise he is feeling well, getting stronger, moving a bit more.  They deny any symptoms or episodes of low blood sugar and while he worries BG is high, he really does not feel too much differently with higher BG, maybe a bit more tired.        We reviewed glucometer, pump and CGMS data together.  It revealed:    Glucose Readings:  09/07/2020  216, 199, 200    09/08/2020  186, 175,  180    09/09/2020  268, 188, 170    09/10/2020  218, 190, 178    09/11/2020  168, 187, 190    09/12/2020  205, 195, 180    09/13/2020  98, 120, 130    09/14/2020  190, 180, 185    09/15/2020  217        History of Diabetes monitoring and complications/ prevention:  CAD: Yes  Last eye exam results: 09/18/2018  Last dental exam: not discussed today.  Microalbuminuria: 6 October 2018  HTN: Yes  On Statin: Yes  On Aspirin: Yes  Depression: No - worried, but happy. Hopeful.    ED: yes, limited concerned at this time     Janice  has a past medical history of CAD (coronary artery disease), CHF (congestive heart failure) (H), CKD (chronic kidney disease), stage III (H), Cortical cataract of both eyes, Diabetes (H), Hyperlipidemia, Hypertension, Ischemic cardiomyopathy, Obesity, CHANTEL (obstructive sleep apnea), and Osteoarthritis. He also has no past medical history of Chronic infection, Complication of anesthesia, COPD (chronic obstructive pulmonary disease) (H), Heart murmur, Irregular heart beat, Liver disease, Other chronic pain, or Uncomplicated asthma.  Current Outpatient Medications   Medication     acetaminophen (TYLENOL) 325 MG tablet     Alcohol Swabs PADS     blood glucose (ACCU-CHEK SMARTVIEW) test strip     blood glucose (NO BRAND SPECIFIED) test strip     blood glucose monitoring (ACCU-CHEK FELIPE SMARTVIEW) meter device kit     blood glucose monitoring (ONE TOUCH DELICA) lancets     calcium carbonate 600 mg-vitamin D 400 units (CALTRATE) 600-400 MG-UNIT per tablet     clotrimazole (MYCELEX) 10 MG lozenge     Continuous Blood Gluc Sensor (FREESTYLE JEREMY 14 DAY SENSOR) MISC     Continuous Blood Gluc Sensor (FREESTYLE JEREMY 14 DAY SENSOR) MISC     fondaparinux ANTICOAGULANT (ARIXTRA) 7.5MG/0.6ML injection     hydrALAZINE (APRESOLINE) 25 MG tablet     insulin aspart (NOVOLOG PEN) 100 UNIT/ML pen     insulin isophane human (HUMULIN N PEN) 100 UNIT/ML injection     insulin pen needle (32G X 4 MM) 32G X 4 MM  "miscellaneous     insulin pen needle (B-D U/F) 31G X 5 MM miscellaneous     insulin pen needle (NOVOFINE) 30G X 8 MM miscellaneous     mycophenolate (GENERIC EQUIVALENT) 250 MG capsule     order for DME     pantoprazole (PROTONIX) 40 MG EC tablet     polyethylene glycol (MIRALAX) 17 g packet     predniSONE (DELTASONE) 10 MG tablet     rosuvastatin (CRESTOR) 10 MG tablet     senna-docusate (SENOKOT-S/PERICOLACE) 8.6-50 MG tablet     sulfamethoxazole-trimethoprim (BACTRIM) 400-80 MG tablet     tacrolimus (GENERIC EQUIVALENT) 1 MG capsule     tamsulosin (FLOMAX) 0.4 MG capsule     valGANciclovir (VALCYTE) 450 MG tablet     magnesium oxide 400 MG CAPS     No current facility-administered medications for this visit.               Lucian's family history includes Diabetes in his brother, sister, and sister.    ROS:   Patient denies any fevers, chills or sweats as well as any changes in or problems with vision, pain or problems with dentition, new or different headaches.  Patient denies symptoms of hypo and hyperglycemia except as above.   Patients denies marked fatigue, cough, shortness of breath, chest pain or pressure.  There has been no pain with or other changes in urination or  itching or pain in genital areas.  Patient denies any noted swelling in feet, ankles or otherwise, loss of sensation or pain  in feet or other areas.    Patient also denies current difficulties with depressed mood, anhedonia or worrying too much.        Exam:    PHONE VISIT    Lucian is alerted and oriented.  With Shivani.  Shivani asking about how to calculate dose when blood sugar is high.  She takes notes; scratches out add 2,4, 6 on sliding scale to make 17, 19 21 etc..   Mood is \"good,\" affect is congruent.  Thoughtful form and content are fluid and coherent.  No signs of distress are appreciated.      Data:      Most recent:  Lab Results   Component Value Date    CR 1.77 08/31/2020     Lab Results   Component Value Date     08/31/2020 "       GFR Estimate   Date Value Ref Range Status   09/14/2020 56 (L) >60 mL/min/[1.73_m2] Final     Comment:     Non  GFR Calc  Starting 12/18/2018, serum creatinine based estimated GFR (eGFR) will be   calculated using the Chronic Kidney Disease Epidemiology Collaboration   (CKD-EPI) equation.     08/31/2020 39 (L) >60 mL/min/[1.73_m2] Final     Comment:     Non  GFR Calc  Starting 12/18/2018, serum creatinine based estimated GFR (eGFR) will be   calculated using the Chronic Kidney Disease Epidemiology Collaboration   (CKD-EPI) equation.     08/28/2020 38 (L) >60 mL/min/[1.73_m2] Final     Comment:     Non  GFR Calc  Starting 12/18/2018, serum creatinine based estimated GFR (eGFR) will be   calculated using the Chronic Kidney Disease Epidemiology Collaboration   (CKD-EPI) equation.       GFR Estimate If Black   Date Value Ref Range Status   09/14/2020 65 >60 mL/min/[1.73_m2] Final     Comment:      GFR Calc  Starting 12/18/2018, serum creatinine based estimated GFR (eGFR) will be   calculated using the Chronic Kidney Disease Epidemiology Collaboration   (CKD-EPI) equation.     08/31/2020 45 (L) >60 mL/min/[1.73_m2] Final     Comment:      GFR Calc  Starting 12/18/2018, serum creatinine based estimated GFR (eGFR) will be   calculated using the Chronic Kidney Disease Epidemiology Collaboration   (CKD-EPI) equation.     08/28/2020 44 (L) >60 mL/min/[1.73_m2] Final     Comment:      GFR Calc  Starting 12/18/2018, serum creatinine based estimated GFR (eGFR) will be   calculated using the Chronic Kidney Disease Epidemiology Collaboration   (CKD-EPI) equation.           Lab Results   Component Value Date    A1C 8.2 (H) 07/03/2020    A1C 7.9 (H) 07/08/2019    A1C 8.5 (H) 03/11/2019    A1C 7.6 (H) 10/16/2018    A1C 9.8 (H) 09/06/2017    HEMOGLOBINA1 8.3 (A) 08/06/2018    HEMOGLOBINA1 10.6 (A) 04/30/2018     Lab Results    Component Value Date    MICROL 6 10/03/2018     No results found for: MICROALBUMIN  No results found for: CPEPT, GADAB, ISCAB  Cholesterol   Date Value Ref Range Status   08/17/2020 118 <200 mg/dL Final   07/08/2019 104 <200 mg/dL Final     HDL Cholesterol   Date Value Ref Range Status   08/17/2020 75 >39 mg/dL Final   07/08/2019 27 (L) >39 mg/dL Final     LDL Cholesterol Calculated   Date Value Ref Range Status   08/17/2020 26 <100 mg/dL Final     Comment:     Desirable:       <100 mg/dl   07/08/2019 41 <100 mg/dL Final     Comment:     Desirable:       <100 mg/dl     Triglycerides   Date Value Ref Range Status   08/17/2020 82 <150 mg/dL Final   07/08/2019 181 (H) <150 mg/dL Final     Comment:     Borderline high:  150-199 mg/dl  High:             200-499 mg/dl  Very high:       >499 mg/dl       Cholesterol/HDL Ratio   Date Value Ref Range Status   06/27/2015 2.6 0.0 - 5.0 Final   01/11/2015 6.0 (H) 0.0 - 5.0 Final                   Assessment/Plan:    Lucian is a 64 year old male with  has a past medical history of CAD (coronary artery disease), CHF (congestive heart failure) (H), CKD (chronic kidney disease), stage III (H), Cortical cataract of both eyes, Diabetes (H), Hyperlipidemia, Hypertension, Ischemic cardiomyopathy, Obesity, CHANTEL (obstructive sleep apnea), and Osteoarthritis. He also has no past medical history of Chronic infection, Complication of anesthesia, COPD (chronic obstructive pulmonary disease) (H), Heart murmur, Irregular heart beat, Liver disease, Other chronic pain, or Uncomplicated asthma.      His type 2 diabetes is uncontrolled now off all oral diabetic medications and exacerbated by steroids, but BG is improved and this should further improve with sliding scale.  Lucian and wife have limited literacy, math skills, so will need to continue examples and ensure we are giving appropriate tools so they they are milly to follow directions.      Doses appear appropriate still inadequate.  We will  begin 10 units Humulin with dinner and check back in a week to see how this is going.  They will call sooner with any BG <70 or >300.      >50% of 30 minute visit spent in counseling, education and coordination of care related to healthy lifestyle, prevention of diabetic complications, options for better glycemic control as well as preventing, detecting, and treating hypoglycemia.      It is my privilege to be involved in the care of the above patient.     Marisabel Prieto PA-C, MPAS  [facility]  Diabetes, Endocrinology, and Metabolism  [phone] Appointments/Nurse  [phone] pager  [phone] nurse line    This note was completed in part using Dragon voice recognition, and may contain word and grammatical errors.

## 2020-09-14 NOTE — PROGRESS NOTES
Patient returned from CCL to 2A post procedure.  Denies pain. Puncture site right side of neck C/D/I.   Magnesium infusion completing. Cardiology VENKAT talking with him & his wife re plan of care.   Instructed to  magnesium prescription from his home pharmacy.

## 2020-09-14 NOTE — IP AVS SNAPSHOT
[address] [address]                                    After Visit Summary   9/14/2020    Lucian Henderson     MRN: [id number]           After Visit Summary Signature Page    I have received my discharge instructions, and my questions have been answered. I have discussed any challenges I see with this plan with the nurse or doctor.    ..........................................................................................................................................  Patient/Patient Representative Signature      ..........................................................................................................................................  Patient Representative Print Name and Relationship to Patient    ..................................................               ................................................  Date                                   Time    ..........................................................................................................................................  Reviewed by Signature/Title    ...................................................              ..............................................  Date                                               Time          22EPIC Rev 08/18

## 2020-09-14 NOTE — PROGRESS NOTES
Prep and teaching complete for heart biopsy; wife at bedside. Shelia Marinelli NP aware of low Mag and hgb; ordered IV and 2 gm mag replacement. IV placed by [facility]; Mag hanging per pharm recommendations. CCL charge RN aware. Pt ready.

## 2020-09-14 NOTE — PROGRESS NOTES
[facility]ULT [facility]    HPI:   Mr. Tee Henderson is a 64 year old male w/ICM now s/p OHT 7/20/2020 c/b donor strep salvarius bacteremia, CAD s/p CABG in 2008, DM Type II, HIT, RUE DVT, urinary retention who presents to clinic today for routine heart transplant follow-up.    His postoperative course was complicated by primary graft dysfunction on POD1 with Echo consistent with EF 20-25% and severe RV dysfunction secondary to prolonged ischemic time. Graft function returned to normal on POD 6. Incidental pericardial effusion noted on Echo 7/27, which noted improved 7/29. He has a history of RIJ clot and remains on fondaparinux due to HIT history. He completed a 7 day course of antibiotics due to donor bacteremia.    He complains of LE edema. He denies fever, chills, oral lesions, lightheadedness, dizziness, chest pain, palpitations, SOB, ALBRIGHT, PND, orthopnea, nausea, vomiting, and diarrhea. Home -200. He took his tac at 7 PM last night, which would make an inaccurate trough. He notes improvement with mobility.     TRANSPLANT MEDICATIONS:  Immunosuppression: Prednisone 10 mg po BID. Tac 3 mg po BID. Goal 10-12. Mycophenolate 1500 mg po BID.   Prophylaxis: Valcyte, Bactrim, and Nystatin.     LAST BIOPSY: Pending today   LAST ANGIOGRAM: Delayed due to transient renal function   Serostatus: CMV: D+/R+, EBV: D+/R+, Toxo: D+/R-  Intolerance to medications: None  Rejection history: None     PAST MEDICAL HISTORY:  Past Medical History:   Diagnosis Date     CAD (coronary artery disease)      CHF (congestive heart failure) (H)      CKD (chronic kidney disease), stage III (H)      Cortical cataract of both eyes      Diabetes (H)      Hyperlipidemia      Hypertension      Ischemic cardiomyopathy      Obesity      CHANTEL (obstructive sleep apnea)     occas cpap     Osteoarthritis        FAMILY HISTORY:  Family History   Problem Relation Age of Onset     Diabetes Brother      Diabetes Sister      Diabetes Sister       Macular Degeneration No family hx of      Glaucoma No family hx of      Myocardial Infarction No family hx of      Kidney Disease No family hx of        SOCIAL HISTORY:  Social History     Socioeconomic History     Marital status:      Spouse name: None     Number of children: 4     Years of education: None     Highest education level: None   Occupational History     Occupation:      Employer: [employer]     Employer: RETIRED   Social Needs     Financial resource strain: None     Food insecurity     Worry: None     Inability: None     Transportation needs     Medical: None     Non-medical: None   Tobacco Use     Smoking status: Never Smoker     Smokeless tobacco: Never Used     Tobacco comment: Never smoked; non-smoking household   Substance and Sexual Activity     Alcohol use: No     Alcohol/week: 0.0 standard drinks     Drug use: No     Sexual activity: Yes     Partners: Female   Lifestyle     Physical activity     Days per week: None     Minutes per session: None     Stress: None   Relationships     Social connections     Talks on phone: None     Gets together: None     Attends Hinduism service: None     Active member of club or organization: None     Attends meetings of clubs or organizations: None     Relationship status: None     Intimate partner violence     Fear of current or ex partner: None     Emotionally abused: None     Physically abused: None     Forced sexual activity: None   Other Topics Concern     Parent/sibling w/ CABG, MI or angioplasty before 65F 55M? No   Social History Narrative     None       CURRENT MEDICATIONS:  No current facility-administered medications for this encounter.      Current Outpatient Medications   Medication     calcium carbonate 600 mg-vitamin D 400 units (CALTRATE) 600-400 MG-UNIT per tablet     clotrimazole (MYCELEX) 10 MG lozenge     fondaparinux ANTICOAGULANT (ARIXTRA) 7.5MG/0.6ML injection     hydrALAZINE (APRESOLINE) 25 MG tablet     insulin  aspart (NOVOLOG PEN) 100 UNIT/ML pen     insulin isophane human (HUMULIN N PEN) 100 UNIT/ML injection     mycophenolate (GENERIC EQUIVALENT) 250 MG capsule     pantoprazole (PROTONIX) 40 MG EC tablet     rosuvastatin (CRESTOR) 10 MG tablet     sulfamethoxazole-trimethoprim (BACTRIM) 400-80 MG tablet     tamsulosin (FLOMAX) 0.4 MG capsule     valGANciclovir (VALCYTE) 450 MG tablet     acetaminophen (TYLENOL) 325 MG tablet     Alcohol Swabs PADS     blood glucose (ACCU-CHEK SMARTVIEW) test strip     blood glucose (NO BRAND SPECIFIED) test strip     blood glucose monitoring (ACCU-CHEK FELIPE SMARTVIEW) meter device kit     blood glucose monitoring (ONE TOUCH DELICA) lancets     Continuous Blood Gluc Sensor (FREESTYLE JEREMY 14 DAY SENSOR) MISC     Continuous Blood Gluc Sensor (FREESTYLE JEREMY 14 DAY SENSOR) MISC     insulin pen needle (32G X 4 MM) 32G X 4 MM miscellaneous     insulin pen needle (B-D U/F) 31G X 5 MM miscellaneous     insulin pen needle (NOVOFINE) 30G X 8 MM miscellaneous     magnesium oxide 400 MG CAPS     order for DME     polyethylene glycol (MIRALAX) 17 g packet     predniSONE (DELTASONE) 10 MG tablet     senna-docusate (SENOKOT-S/PERICOLACE) 8.6-50 MG tablet     tacrolimus (GENERIC EQUIVALENT) 1 MG capsule     ROS:   CONSTITUTIONAL: Denies fever, chills, fatigue, or weight fluctuations.   HEENT: Denies headache, vision changes, and changes in speech.   CV: Refer to HPI.   PULMONARY:Denies shortness of breath, cough, or previous TB exposure.   GI:Denies nausea, vomiting, diarrhea, and abdominal pain. Bowel movements are regular.   :Denies urinary alterations, dysuria, urinary frequency, hematuria, and abnormal drainage.   EXT: Complains of lower extremity edema.   SKIN:Denies abnormal rashes or lesions.   MUSCULOSKELETAL:Denies upper or lower extremity weakness and pain.   NEUROLOGIC:Denies lightheadedness, dizziness, seizures, or upper or lower extremity paresthesia.     EXAM:  /60 (BP  Location: Right arm)   Pulse 92   Temp 98.1  F (36.7  C) (Oral)   Resp 16   SpO2 99%   GENERAL: Appears alert and oriented times three.   HEENT: Eye symmetrical and free of discharge bilaterally. Mucous membranes moist and without lesions.  NECK: Supple and without lymphadenopathy. JVD 8 cm  CV: RRR, S1S2 present without murmur, rub, or gallop.   RESPIRATORY: Respirations regular, even, and unlabored. Lungs CTA throughout.   GI: Soft and non distended with normoactive bowel sounds present in all quadrants. No tenderness, rebound, guarding. No organomegaly.   EXTREMITIES: +1-2 bilateral LE peripheral edema. 2+ bilateral pedal pulses.   NEUROLOGIC: Alert and orientated x 3. CN II-XII grossly intact. No focal deficits.   MUSCULOSKELETAL: No joint swelling or tenderness.   SKIN: No jaundice. No rashes or lesions.     Labs:  CBC RESULTS:  Lab Results   Component Value Date    WBC 4.0 09/14/2020    RBC 2.59 (L) 09/14/2020    HGB 7.9 (L) 09/14/2020    HCT 25.7 (L) 09/14/2020    MCV 99 09/14/2020    MCH 30.5 09/14/2020    MCHC 30.7 (L) 09/14/2020    RDW 20.0 (H) 09/14/2020     09/14/2020       CMP RESULTS:  Lab Results   Component Value Date     09/14/2020    POTASSIUM 4.1 09/14/2020    CHLORIDE 106 09/14/2020    CO2 22 09/14/2020    ANIONGAP 7 09/14/2020     (H) 09/14/2020    BUN 47 (H) 09/14/2020    CR 1.30 (H) 09/14/2020    GFRESTIMATED 56 (L) 09/14/2020    GFRESTBLACK 65 09/14/2020    BRYANNA 7.8 (L) 09/14/2020    BILITOTAL 0.3 09/14/2020    ALBUMIN 2.7 (L) 09/14/2020    ALKPHOS 100 09/14/2020    ALT 26 09/14/2020    AST 19 09/14/2020        INR RESULTS:  Lab Results   Component Value Date    INR 1.11 08/25/2020       LIPID RESULTS:  Lab Results   Component Value Date    CHOL 118 08/17/2020    HDL 75 08/17/2020    LDL 26 08/17/2020    TRIG 82 08/17/2020    CHOLHDLRATIO 2.6 06/27/2015       IMMUNOSUPPRESSANT LEVELS  Lab Results   Component Value Date    TACROL 9.6 08/31/2020    DOSTAC Not Provided  08/31/2020       No components found for: CK  Lab Results   Component Value Date    MAG 1.3 (L) 09/14/2020     Lab Results   Component Value Date    A1C 8.2 (H) 07/03/2020     Lab Results   Component Value Date    PHOS 2.2 (L) 09/14/2020     Lab Results   Component Value Date    NTBNP 6,187 (H) 11/25/2019     Lab Results   Component Value Date    SAITESTMET [facility] 08/17/2020    SAICELL Class I 08/17/2020    [id number] None 08/17/2020    [id number] B:64 08/17/2020    SAIREPCOM  08/17/2020      Test performed by modified procedure. Serum heat inactivated and tested   by a modified ([place]) protocol including fetal calf serum addition.   High-risk, mfi >3,000. Mod-risk, mfi 500-3,000.       Lab Results   Component Value Date    SAIITESTME [facility] 08/17/2020    SAIICELL Class II 08/17/2020    [id number] None 08/17/2020    [id number] DR:11 08/17/2020    SAIIREPCOM  08/17/2020      Test performed by modified procedure. Serum heat inactivated and tested   by a modified ([place]) protocol including fetal calf serum addition.   High-risk, mfi >3,000. Mod-risk, mfi 500-3,000.       Lab Results   Component Value Date    CSPEC Plasma, EDTA anticoagulant 08/17/2020       Diagnostic Studies:  RHC 9/14/20:  RA 13/11/9  RV 36/9  PA 36/20/25  PCWP 15/22/15  LIO 6.1/3.3  TD 5.2/2.8  PVR 1.6 (LIO)  SVR 1167 (LIO) Right sided filling pressures are mildly elevated.Left sided filling pressures are mildly elevated. Mild elevated Pulmonary Hypertension.Normal cardiac output level.Hemodynamic data has been modified in Epic per physician review.    Assessment and Plan:   Mr. Tee Henderson is a 64 year old male w/ICM now s/p OHT 7/20/2020 c/b donor strep salvarius bacteremia, CAD s/p CABG in 2008, DM Type II, HIT, RUE DVT, urinary retention who presents to clinic today for routine heart transplant follow-up. He continues to struggle with LE edema and has some marrow suppression likely in setting of immunosuppression.      Status post Heart  Transplantation on 7/20/20 due to ICM. Primary graft dysfunction, resolved.    Change in immunosuppression: yes  Reason for Change: pancytopenia.   Immunosuppression: Tac 5 mg po BID level pending (goal 10-12). Mycophenolate 1500 mg po BID. Prednisone 10 mg po BID.   Prophylaxis: Valcyte, Bactrim, and Nystatin.   - Continue ASA and Crestor.     Next Biopsy: Pending today  Next Angiogram: Deferred in setting of renal function   Next Stress Test: Per protocol   Dermatology evaluation: Annually   Opthalmology evaluation: Annually   - Will discuss timing of angiogram with the team.      Anemia. No evidence for acute blood loss.   - Hg stable at 7.7. Repeat CBC in 1 week.   - Remains on Mycophenolate 1500 mg po BID, Valcyte CMV: D+/R+ and Bactrim as well (toxo donor +). Repeat CBC in 1 week. Will decrease Prednisone per protocol pending biopsy.     Hypomagnesemia. Mg 1.5.   -  Initiated Magnesium Oxide 400 mg po daily.   - Repeat Mg with next Tac level.      LE edema. Filling pressures stable, defer diuretic increase.   - Encouraged protein intake, TEDS, and activity.      RIJ and RUE DVT, provoked. US 7/22/20 consistent with nonocclusive thrombus in the right internal jugular vein along the intravenous catheter demonstrated, nonocclusive thrombus along the right PICC line in the right  axillary vein demonstrated, and occlusive thrombus in the superficial right cephalic vein demonstrated as above.  - Given HIT history continue Fondaparinux for 3 months, hold morning of biopsies.      Donor Streptococcus salivarius bacteremia  - transplant ID consult 7/21, treated with ceftriaxone. Course complete.     Follow up CBC and Mg in 1 week and RHC/biopsy 9/28 as scheduled. Repeat Tac level in setting of inaccurate trough.     ELIJAH Arndt CNP  9/14/2020          CC  SHONDA MERCHANT

## 2020-09-14 NOTE — PROGRESS NOTES
Patient tolerated recovery stage well. VSS, RIJV site clean/dry/intact, no hematoma, and denies pain. Patient tolerated PO food and fluids. Teaching was done and discharge instructions were given. Patient ambulated in room and PIV was removed. Patient discharged from the hospital via wheel chair to home with his wife.

## 2020-09-14 NOTE — PROGRESS NOTES
[facility]   Interventional Cardiology        Consenting/Education for Right Heart Catheterization/Endomyocardial Biopsy     Patient understands as a part of routine surveillance after heart transplant we would like to perform a right heart catheterization/endomyocardial biopsy.  This procedure will be performed by Dr. Hudson.    Patient understands during the portion for right heart catheterization a fine tube (catheter) is put into the vein of the groin/neck.  It is carefully passed along until it reaches the heart and then goes up into the blood vessels of the lungs. This is done to measure a variety of pressures in your heart and can tell us how well the heart is filling and emptying, as well as monitor fluid status. While the catheter is in your neck/groin vein, a  Biopsy forceps  or pincers at the end of the catheter are used to take the tissue samples. This is may be repeated to get enough samples. The biopsy pieces are very small (one to two millimeters).     Patient also understands risks and complications of the procedure which include, but are not limited to bruising around the incision site, infection, bleeding, and allergic reaction to local anesthetic.      Patient verbalized understanding of risks and benefits of the right heart catheterization and endomyocardial biopsy and has elected to proceed with the procedure.       Shelia Marinelli, SONA APRN CNP   [facility] Interventional Cardiology

## 2020-09-15 ENCOUNTER — VIRTUAL VISIT (OUTPATIENT)
Dept: ENDOCRINOLOGY | Facility: CLINIC | Age: 65
End: 2020-09-15
Payer: COMMERCIAL

## 2020-09-15 ENCOUNTER — APPOINTMENT (OUTPATIENT)
Dept: INTERPRETER SERVICES | Facility: CLINIC | Age: 65
End: 2020-09-15
Payer: COMMERCIAL

## 2020-09-15 ENCOUNTER — TELEPHONE (OUTPATIENT)
Dept: TRANSPLANT | Facility: CLINIC | Age: 65
End: 2020-09-15

## 2020-09-15 DIAGNOSIS — Z94.1 HEART TRANSPLANT, ORTHOTOPIC, STATUS (H): Primary | ICD-10-CM

## 2020-09-15 DIAGNOSIS — Z79.4 TYPE 2 DIABETES MELLITUS WITH HYPERGLYCEMIA, WITH LONG-TERM CURRENT USE OF INSULIN (H): Primary | ICD-10-CM

## 2020-09-15 DIAGNOSIS — E11.65 TYPE 2 DIABETES MELLITUS WITH HYPERGLYCEMIA, WITH LONG-TERM CURRENT USE OF INSULIN (H): Primary | ICD-10-CM

## 2020-09-15 LAB — COPATH REPORT: NORMAL

## 2020-09-15 NOTE — PATIENT INSTRUCTIONS
Plan para diabetes:     Humulin N 35 unidades cada manana     Y Humulin N 10 unidades de noche       - - - - - - - -      Con las comidas:       Novolog 15 - 31 unidades cada 4 a 6 horas antes de cada comida de acuerdo con el azucar antes de comer.  Novolog 15 - 31 units every 4-6 hours BEFORE meals, depending on blood glucose (BG) BEFORE meals.       Por favor tenga cuidado de no acerder con las carbohidratas en las comidas.     Para glucosa < 140 antes de comer: 15 unidades Novolog con la comida.     Para -169 give 17 units antes de comer.  -199 give 19 units.  -229 give 21 units.  -259 give 23 units.  -289 give 25 units.  -319 give 27 units.  -349 give 29 units.  BG >/= 350 give 31 units.        Si el azucar sigue >300 o si hay <70, por favor llmann hernandezza:     My best wishes,     Marisabel Prieto PA-C, MPAS  [facility]  Diabetes, Endocrinology, and Metabolism  [phone] Appointments/Nurse  [phone] Fax  [phone] URGENTafter hours/weekend Endocrinologist on call

## 2020-09-15 NOTE — TELEPHONE ENCOUNTER
Pt called with lab results from yesterday's visit.  Tacrolimus level low at 3.4, however pt took med at 7pm the night before.  Pt to have labs rechecked this Friday before Cardiac Rehab at [place].  Pt states understanding.

## 2020-09-15 NOTE — LETTER
"9/15/2020       RE: Lucian Henderson Sr.  [address] [facility] [address]     Dear Colleague,    Thank you for referring your patient, Lucian Henderson Sr., to the [facility] ENDOCRINOLOGY at [facility]. Please see a copy of my visit note below.    Lucian Henderson Sr. is a 64 year old male who is being evaluated via a billable video visit.      The patient has been notified of following:     \"This video visit will be conducted via a call between you and your physician/provider. We have found that certain health care needs can be provided without the need for an in-person physical exam.  This service lets us provide the care you need with a video conversation.  If a prescription is necessary we can send it directly to your pharmacy.  If lab work is needed we can place an order for that and you can then stop by our lab to have the test done at a later time.    Video visits are billed at different rates depending on your insurance coverage.  Please reach out to your insurance provider with any questions.    If during the course of the call the physician/provider feels a video visit is not appropriate, you will not be charged for this service.\"    Patient has given verbal consent for Video visit? Yes  How would you like to obtain your AVS? MyChart  Will anyone else be joining your video visit? No    Video-Visit Details    Type of service:  Video Visit    Video Start Time: 11:06 AM  Video End Time: 11:25 AM    Originating Location (pt. Location): Home    Distant Location (provider location):  [facility] ENDOCRINOLOGY     Liyah Snowden MA            Diabetes Virtual Consult Note  Marisabel Prieto PA-C  September 1, 2020    Lucian Henderson Sr. is a 64 year old male with a past medical history including  has a past medical history of CAD (coronary artery disease), CHF (congestive heart failure) (H), CKD (chronic kidney disease), stage III (H), " Cortical cataract of both eyes, Diabetes (H), Hyperlipidemia, Hypertension, Ischemic cardiomyopathy, Obesity, CHANTEL (obstructive sleep apnea), and Osteoarthritis. He also has no past medical history of Chronic infection, Complication of anesthesia, COPD (chronic obstructive pulmonary disease) (H), Heart murmur, Irregular heart beat, Liver disease, Other chronic pain, or Uncomplicated asthma.      Recently hospitalized due to high BG.  Hospital discharge notes Increased BS since transplant due to prednisone. Controlled during index admission. Recently increase by NPH as outpatient. No evidence of DKA or HHS on admission. No evidence of infection or acute illness driving hyperglycemia. Negative ketones, UA with glucose. Endocrine consulted and managed. Diabetes education met w patient and his wife - appears elevated BS were due to error in insulin administration. See their note for details. BS controlled day of discharge on NPH 35 units qAM. D/c'ed on this dose and Novolog 15 units with each meal plus sliding scale as following:  Plan:     -  NPH 35 units QAM  -  No NPH at HS  -  Aspart/lispro 15 units per meal (meal target 75 grams carb) + 1u :30     Aspart/lispro correction     Para glucosa < 140 antes de comer: 15 unidades Novolog con la comida.     Para -169 give 17 units antes de comer.  -199 give 19 units.  -229 give 21 units.  -259 give 23 units.  -289 give 25 units.  -319 give 27 units.  -349 give 29 units.  BG >/= 350 give 31 units.    They are giving 35 unit each morning.  They are also giving 15 21 units Novolog before each meal with sliding scale but are worried blood glucose is high, particularly in the morning,  The do not understand why as Lucian tells me dinner is his smallest meal.  He has just bread and coffee.  They are worried it is because he has none of his DM pills.     Otherwise he is feeling well, getting stronger, moving a bit more.  They deny any  symptoms or episodes of low blood sugar and while he worries BG is high, he really does not feel too much differently with higher BG, maybe a bit more tired.        We reviewed glucometer, pump and CGMS data together.  It revealed:    Glucose Readings:  09/07/2020  216, 199, 200    09/08/2020  186, 175, 180    09/09/2020  268, 188, 170    09/10/2020  218, 190, 178    09/11/2020  168, 187, 190    09/12/2020  205, 195, 180    09/13/2020  98, 120, 130    09/14/2020  190, 180, 185    09/15/2020  217        History of Diabetes monitoring and complications/ prevention:  CAD: Yes  Last eye exam results: 09/18/2018  Last dental exam: not discussed today.  Microalbuminuria: 6 October 2018  HTN: Yes  On Statin: Yes  On Aspirin: Yes  Depression: No - worried, but happy. Hopeful.    ED: yes, limited concerned at this time     Janice  has a past medical history of CAD (coronary artery disease), CHF (congestive heart failure) (H), CKD (chronic kidney disease), stage III (H), Cortical cataract of both eyes, Diabetes (H), Hyperlipidemia, Hypertension, Ischemic cardiomyopathy, Obesity, CHANTEL (obstructive sleep apnea), and Osteoarthritis. He also has no past medical history of Chronic infection, Complication of anesthesia, COPD (chronic obstructive pulmonary disease) (H), Heart murmur, Irregular heart beat, Liver disease, Other chronic pain, or Uncomplicated asthma.  Current Outpatient Medications   Medication     acetaminophen (TYLENOL) 325 MG tablet     Alcohol Swabs PADS     blood glucose (ACCU-CHEK SMARTVIEW) test strip     blood glucose (NO BRAND SPECIFIED) test strip     blood glucose monitoring (ACCU-CHEK FELIPE SMARTVIEW) meter device kit     blood glucose monitoring (ONE TOUCH DELICA) lancets     calcium carbonate 600 mg-vitamin D 400 units (CALTRATE) 600-400 MG-UNIT per tablet     clotrimazole (MYCELEX) 10 MG lozenge     Continuous Blood Gluc Sensor (FREESTYLE JEREMY 14 DAY SENSOR) MISC     Continuous Blood Gluc Sensor  (FREESTYLE JEREMY 14 DAY SENSOR) [facility]     fondaparinux ANTICOAGULANT (ARIXTRA) 7.5MG/0.6ML injection     hydrALAZINE (APRESOLINE) 25 MG tablet     insulin aspart (NOVOLOG PEN) 100 UNIT/ML pen     insulin isophane human (HUMULIN N PEN) 100 UNIT/ML injection     insulin pen needle (32G X 4 MM) 32G X 4 MM miscellaneous     insulin pen needle (B-D U/F) 31G X 5 MM miscellaneous     insulin pen needle (NOVOFINE) 30G X 8 MM miscellaneous     mycophenolate (GENERIC EQUIVALENT) 250 MG capsule     order for DME     pantoprazole (PROTONIX) 40 MG EC tablet     polyethylene glycol (MIRALAX) 17 g packet     predniSONE (DELTASONE) 10 MG tablet     rosuvastatin (CRESTOR) 10 MG tablet     senna-docusate (SENOKOT-S/PERICOLACE) 8.6-50 MG tablet     sulfamethoxazole-trimethoprim (BACTRIM) 400-80 MG tablet     tacrolimus (GENERIC EQUIVALENT) 1 MG capsule     tamsulosin (FLOMAX) 0.4 MG capsule     valGANciclovir (VALCYTE) 450 MG tablet     magnesium oxide 400 MG CAPS     No current facility-administered medications for this visit.               Lucian's family history includes Diabetes in his brother, sister, and sister.    ROS:   Patient denies any fevers, chills or sweats as well as any changes in or problems with vision, pain or problems with dentition, new or different headaches.  Patient denies symptoms of hypo and hyperglycemia except as above.   Patients denies marked fatigue, cough, shortness of breath, chest pain or pressure.  There has been no pain with or other changes in urination or  itching or pain in genital areas.  Patient denies any noted swelling in feet, ankles or otherwise, loss of sensation or pain  in feet or other areas.    Patient also denies current difficulties with depressed mood, anhedonia or worrying too much.        Exam:    PHONE VISIT    Lucian is alerted and oriented.  With Shivani.  Shivani asking about how to calculate dose when blood sugar is high.  She takes notes; scratches out add 2,4, 6 on sliding scale  "to make 17, 19 21 etc..   Mood is \"good,\" affect is congruent.  Thoughtful form and content are fluid and coherent.  No signs of distress are appreciated.      Data:      Most recent:  Lab Results   Component Value Date    CR 1.77 08/31/2020     Lab Results   Component Value Date     08/31/2020       GFR Estimate   Date Value Ref Range Status   09/14/2020 56 (L) >60 mL/min/[1.73_m2] Final     Comment:     Non  GFR Calc  Starting 12/18/2018, serum creatinine based estimated GFR (eGFR) will be   calculated using the Chronic Kidney Disease Epidemiology Collaboration   (CKD-EPI) equation.     08/31/2020 39 (L) >60 mL/min/[1.73_m2] Final     Comment:     Non  GFR Calc  Starting 12/18/2018, serum creatinine based estimated GFR (eGFR) will be   calculated using the Chronic Kidney Disease Epidemiology Collaboration   (CKD-EPI) equation.     08/28/2020 38 (L) >60 mL/min/[1.73_m2] Final     Comment:     Non  GFR Calc  Starting 12/18/2018, serum creatinine based estimated GFR (eGFR) will be   calculated using the Chronic Kidney Disease Epidemiology Collaboration   (CKD-EPI) equation.       GFR Estimate If Black   Date Value Ref Range Status   09/14/2020 65 >60 mL/min/[1.73_m2] Final     Comment:      GFR Calc  Starting 12/18/2018, serum creatinine based estimated GFR (eGFR) will be   calculated using the Chronic Kidney Disease Epidemiology Collaboration   (CKD-EPI) equation.     08/31/2020 45 (L) >60 mL/min/[1.73_m2] Final     Comment:      GFR Calc  Starting 12/18/2018, serum creatinine based estimated GFR (eGFR) will be   calculated using the Chronic Kidney Disease Epidemiology Collaboration   (CKD-EPI) equation.     08/28/2020 44 (L) >60 mL/min/[1.73_m2] Final     Comment:      GFR Calc  Starting 12/18/2018, serum creatinine based estimated GFR (eGFR) will be   calculated using the Chronic Kidney Disease Epidemiology " Collaboration   (CKD-EPI) equation.           Lab Results   Component Value Date    A1C 8.2 (H) 07/03/2020    A1C 7.9 (H) 07/08/2019    A1C 8.5 (H) 03/11/2019    A1C 7.6 (H) 10/16/2018    A1C 9.8 (H) 09/06/2017    HEMOGLOBINA1 8.3 (A) 08/06/2018    HEMOGLOBINA1 10.6 (A) 04/30/2018     Lab Results   Component Value Date    MICROL 6 10/03/2018     No results found for: MICROALBUMIN  No results found for: CPEPT, GADAB, ISCAB  Cholesterol   Date Value Ref Range Status   08/17/2020 118 <200 mg/dL Final   07/08/2019 104 <200 mg/dL Final     HDL Cholesterol   Date Value Ref Range Status   08/17/2020 75 >39 mg/dL Final   07/08/2019 27 (L) >39 mg/dL Final     LDL Cholesterol Calculated   Date Value Ref Range Status   08/17/2020 26 <100 mg/dL Final     Comment:     Desirable:       <100 mg/dl   07/08/2019 41 <100 mg/dL Final     Comment:     Desirable:       <100 mg/dl     Triglycerides   Date Value Ref Range Status   08/17/2020 82 <150 mg/dL Final   07/08/2019 181 (H) <150 mg/dL Final     Comment:     Borderline high:  150-199 mg/dl  High:             200-499 mg/dl  Very high:       >499 mg/dl       Cholesterol/HDL Ratio   Date Value Ref Range Status   06/27/2015 2.6 0.0 - 5.0 Final   01/11/2015 6.0 (H) 0.0 - 5.0 Final                   Assessment/Plan:    Lucian is a 64 year old male with  has a past medical history of CAD (coronary artery disease), CHF (congestive heart failure) (H), CKD (chronic kidney disease), stage III (H), Cortical cataract of both eyes, Diabetes (H), Hyperlipidemia, Hypertension, Ischemic cardiomyopathy, Obesity, CHANTEL (obstructive sleep apnea), and Osteoarthritis. He also has no past medical history of Chronic infection, Complication of anesthesia, COPD (chronic obstructive pulmonary disease) (H), Heart murmur, Irregular heart beat, Liver disease, Other chronic pain, or Uncomplicated asthma.      His type 2 diabetes is uncontrolled now off all oral diabetic medications and exacerbated by steroids, but  BG is improved and this should further improve with sliding scale.  Lucian and wife have limited literacy, math skills, so will need to continue examples and ensure we are giving appropriate tools so they they are milly to follow directions.      Doses appear appropriate still inadequate.  We will begin 10 units Humulin with dinner and check back in a week to see how this is going.  They will call sooner with any BG <70 or >300.      >50% of 30 minute visit spent in counseling, education and coordination of care related to healthy lifestyle, prevention of diabetic complications, options for better glycemic control as well as preventing, detecting, and treating hypoglycemia.      It is my privilege to be involved in the care of the above patient.     Marisabel Prieto PA-C, MPAS  [facility]  Diabetes, Endocrinology, and Metabolism  [phone] Appointments/Nurse  [phone] pager  [phone] nurse line    This note was completed in part using Dragon voice recognition, and may contain word and grammatical errors.

## 2020-09-16 ENCOUNTER — TELEPHONE (OUTPATIENT)
Dept: TRANSPLANT | Facility: CLINIC | Age: 65
End: 2020-09-16

## 2020-09-16 ENCOUNTER — HOSPITAL ENCOUNTER (OUTPATIENT)
Dept: CARDIAC REHAB | Facility: CLINIC | Age: 65
End: 2020-09-16
Attending: SURGERY
Payer: COMMERCIAL

## 2020-09-16 DIAGNOSIS — Z94.1 HEART TRANSPLANT, ORTHOTOPIC, STATUS (H): ICD-10-CM

## 2020-09-16 PROCEDURE — 40000116 ZZH STATISTIC OP CR VISIT

## 2020-09-16 PROCEDURE — 93798 PHYS/QHP OP CAR RHAB W/ECG: CPT

## 2020-09-16 RX ORDER — PREDNISONE 10 MG/1
TABLET ORAL
Qty: 180 TABLET | Refills: 0 | Status: ON HOLD | OUTPATIENT
Start: 2020-09-16 | End: 2020-10-05

## 2020-09-16 NOTE — TELEPHONE ENCOUNTER
Pt called with heart biopsy results.  Negative heart biopsy- 0R.  Pt to decrease Predinsone to 10mg in the AM and 5mg in the PM.    Pt reminded to have his Tacrolimus level checked this Friday- appointment has been made.  Pt and wife state understanding instructions and plan of care.

## 2020-09-18 ENCOUNTER — TELEPHONE (OUTPATIENT)
Dept: TRANSPLANT | Facility: CLINIC | Age: 65
End: 2020-09-18

## 2020-09-18 ENCOUNTER — HOSPITAL ENCOUNTER (OUTPATIENT)
Dept: CARDIAC REHAB | Facility: CLINIC | Age: 65
End: 2020-09-18
Attending: SURGERY
Payer: COMMERCIAL

## 2020-09-18 DIAGNOSIS — Z94.1 HEART TRANSPLANT, ORTHOTOPIC, STATUS (H): ICD-10-CM

## 2020-09-18 LAB
ANION GAP SERPL CALCULATED.3IONS-SCNC: 10 MMOL/L (ref 3–14)
BUN SERPL-MCNC: 40 MG/DL (ref 7–30)
CALCIUM SERPL-MCNC: 8 MG/DL (ref 8.5–10.1)
CHLORIDE SERPL-SCNC: 107 MMOL/L (ref 94–109)
CO2 SERPL-SCNC: 17 MMOL/L (ref 20–32)
CREAT SERPL-MCNC: 1.17 MG/DL (ref 0.66–1.25)
ERYTHROCYTE [DISTWIDTH] IN BLOOD BY AUTOMATED COUNT: 19.8 % (ref 10–15)
GFR SERPL CREATININE-BSD FRML MDRD: 64 ML/MIN/{1.73_M2}
GLUCOSE SERPL-MCNC: 252 MG/DL (ref 70–99)
HCT VFR BLD AUTO: 24.5 % (ref 40–53)
HGB BLD-MCNC: 7.7 G/DL (ref 13.3–17.7)
MAGNESIUM SERPL-MCNC: 1.5 MG/DL (ref 1.6–2.3)
MCH RBC QN AUTO: 30.8 PG (ref 26.5–33)
MCHC RBC AUTO-ENTMCNC: 31.4 G/DL (ref 31.5–36.5)
MCV RBC AUTO: 98 FL (ref 78–100)
PHOSPHATE SERPL-MCNC: 1.5 MG/DL (ref 2.5–4.5)
PLATELET # BLD AUTO: 152 10E9/L (ref 150–450)
POTASSIUM SERPL-SCNC: 4.5 MMOL/L (ref 3.4–5.3)
RBC # BLD AUTO: 2.5 10E12/L (ref 4.4–5.9)
SODIUM SERPL-SCNC: 134 MMOL/L (ref 133–144)
TACROLIMUS BLD-MCNC: 3.1 UG/L (ref 5–15)
TME LAST DOSE: ABNORMAL H
WBC # BLD AUTO: 4.2 10E9/L (ref 4–11)

## 2020-09-18 PROCEDURE — 36415 COLL VENOUS BLD VENIPUNCTURE: CPT | Performed by: INTERNAL MEDICINE

## 2020-09-18 PROCEDURE — 80197 ASSAY OF TACROLIMUS: CPT | Performed by: INTERNAL MEDICINE

## 2020-09-18 PROCEDURE — 83735 ASSAY OF MAGNESIUM: CPT | Performed by: INTERNAL MEDICINE

## 2020-09-18 PROCEDURE — 93798 PHYS/QHP OP CAR RHAB W/ECG: CPT | Performed by: OCCUPATIONAL THERAPIST

## 2020-09-18 PROCEDURE — 40000116 ZZH STATISTIC OP CR VISIT: Performed by: OCCUPATIONAL THERAPIST

## 2020-09-18 PROCEDURE — 85027 COMPLETE CBC AUTOMATED: CPT | Performed by: INTERNAL MEDICINE

## 2020-09-18 PROCEDURE — 84100 ASSAY OF PHOSPHORUS: CPT | Performed by: INTERNAL MEDICINE

## 2020-09-18 PROCEDURE — 80048 BASIC METABOLIC PNL TOTAL CA: CPT | Performed by: INTERNAL MEDICINE

## 2020-09-18 RX ORDER — TACROLIMUS 1 MG/1
CAPSULE ORAL
Qty: 630 CAPSULE | Refills: 3 | Status: ON HOLD | OUTPATIENT
Start: 2020-09-18 | End: 2020-10-05

## 2020-09-18 NOTE — TELEPHONE ENCOUNTER
"Pt's Tacrolimus level 3.1.  Confirmed with pt that he took his Tacrolimus at 6pm- making this a 16 drug trough.  Pt states he has been taking \"most\" of his troches.  Instructed pt to increase his Tacrolimus dose to 4mg in the AM and 3mg in the PM.  12 hour trough reviewed again with pt and wife.  Pt to have his level rechecked Monday morning prior to his Cardiac Rehab appointment.  Message also left with pt's daughter, Elisabeth, to review instructions above.  Pt and wife state understanding.  Call was placed using a .  "

## 2020-09-21 ENCOUNTER — HOSPITAL ENCOUNTER (OUTPATIENT)
Dept: CARDIAC REHAB | Facility: CLINIC | Age: 65
End: 2020-09-21
Attending: SURGERY
Payer: COMMERCIAL

## 2020-09-21 ENCOUNTER — TELEPHONE (OUTPATIENT)
Dept: TRANSPLANT | Facility: CLINIC | Age: 65
End: 2020-09-21

## 2020-09-21 DIAGNOSIS — Z94.1 HEART TRANSPLANT, ORTHOTOPIC, STATUS (H): Primary | ICD-10-CM

## 2020-09-21 DIAGNOSIS — Z94.1 HEART TRANSPLANT, ORTHOTOPIC, STATUS (H): ICD-10-CM

## 2020-09-21 DIAGNOSIS — Z94.1 HEART REPLACED BY TRANSPLANT (H): Primary | ICD-10-CM

## 2020-09-21 LAB
ANION GAP SERPL CALCULATED.3IONS-SCNC: 9 MMOL/L (ref 3–14)
BUN SERPL-MCNC: 45 MG/DL (ref 7–30)
CALCIUM SERPL-MCNC: 7.9 MG/DL (ref 8.5–10.1)
CHLORIDE SERPL-SCNC: 104 MMOL/L (ref 94–109)
CO2 SERPL-SCNC: 20 MMOL/L (ref 20–32)
CREAT SERPL-MCNC: 1.57 MG/DL (ref 0.66–1.25)
ERYTHROCYTE [DISTWIDTH] IN BLOOD BY AUTOMATED COUNT: 19.9 % (ref 10–15)
GFR SERPL CREATININE-BSD FRML MDRD: 45 ML/MIN/{1.73_M2}
GLUCOSE SERPL-MCNC: 212 MG/DL (ref 70–99)
HCT VFR BLD AUTO: 22.9 % (ref 40–53)
HGB BLD-MCNC: 7.2 G/DL (ref 13.3–17.7)
MAGNESIUM SERPL-MCNC: 1.1 MG/DL (ref 1.6–2.3)
MCH RBC QN AUTO: 31.9 PG (ref 26.5–33)
MCHC RBC AUTO-ENTMCNC: 31.4 G/DL (ref 31.5–36.5)
MCV RBC AUTO: 101 FL (ref 78–100)
PHOSPHATE SERPL-MCNC: 1.8 MG/DL (ref 2.5–4.5)
PLATELET # BLD AUTO: 179 10E9/L (ref 150–450)
POTASSIUM SERPL-SCNC: 4.7 MMOL/L (ref 3.4–5.3)
RBC # BLD AUTO: 2.26 10E12/L (ref 4.4–5.9)
SODIUM SERPL-SCNC: 133 MMOL/L (ref 133–144)
TACROLIMUS BLD-MCNC: 6.5 UG/L (ref 5–15)
TME LAST DOSE: NORMAL H
WBC # BLD AUTO: 3.1 10E9/L (ref 4–11)

## 2020-09-21 PROCEDURE — 80197 ASSAY OF TACROLIMUS: CPT | Performed by: INTERNAL MEDICINE

## 2020-09-21 PROCEDURE — 93798 PHYS/QHP OP CAR RHAB W/ECG: CPT | Performed by: OCCUPATIONAL THERAPIST

## 2020-09-21 PROCEDURE — 83735 ASSAY OF MAGNESIUM: CPT | Performed by: INTERNAL MEDICINE

## 2020-09-21 PROCEDURE — 85027 COMPLETE CBC AUTOMATED: CPT | Performed by: INTERNAL MEDICINE

## 2020-09-21 PROCEDURE — 84100 ASSAY OF PHOSPHORUS: CPT | Performed by: INTERNAL MEDICINE

## 2020-09-21 PROCEDURE — 36415 COLL VENOUS BLD VENIPUNCTURE: CPT | Performed by: INTERNAL MEDICINE

## 2020-09-21 PROCEDURE — 80048 BASIC METABOLIC PNL TOTAL CA: CPT | Performed by: INTERNAL MEDICINE

## 2020-09-21 PROCEDURE — 40000116 ZZH STATISTIC OP CR VISIT: Performed by: OCCUPATIONAL THERAPIST

## 2020-09-21 NOTE — TELEPHONE ENCOUNTER
Called pt, and spoke with daughter, Elisabeth, regarding pt's lab results.  Hgb 7.2, Mag 1.1.  Dr. Sheridan to review and call back with plan.  Will update pt and daughter with plan from Dr. Sheridan.

## 2020-09-22 ENCOUNTER — HOSPITAL ENCOUNTER (INPATIENT)
Facility: CLINIC | Age: 65
LOS: 13 days | Discharge: HOME HEALTH | DRG: 857 | End: 2020-10-05
Attending: EMERGENCY MEDICINE | Admitting: INTERNAL MEDICINE
Payer: COMMERCIAL

## 2020-09-22 ENCOUNTER — TELEPHONE (OUTPATIENT)
Dept: TRANSPLANT | Facility: CLINIC | Age: 65
End: 2020-09-22

## 2020-09-22 ENCOUNTER — APPOINTMENT (OUTPATIENT)
Dept: CT IMAGING | Facility: CLINIC | Age: 65
DRG: 857 | End: 2020-09-22
Attending: EMERGENCY MEDICINE
Payer: COMMERCIAL

## 2020-09-22 ENCOUNTER — INFUSION THERAPY VISIT (OUTPATIENT)
Dept: INFUSION THERAPY | Facility: CLINIC | Age: 65
DRG: 857 | End: 2020-09-22
Attending: NURSE PRACTITIONER
Payer: COMMERCIAL

## 2020-09-22 VITALS
SYSTOLIC BLOOD PRESSURE: 142 MMHG | TEMPERATURE: 99.1 F | HEART RATE: 99 BPM | RESPIRATION RATE: 16 BRPM | DIASTOLIC BLOOD PRESSURE: 72 MMHG

## 2020-09-22 DIAGNOSIS — Z94.1 HEART REPLACED BY TRANSPLANT (H): ICD-10-CM

## 2020-09-22 DIAGNOSIS — Z20.828 EXPOSURE TO SARS-ASSOCIATED CORONAVIRUS: ICD-10-CM
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
## 2020-09-22 DIAGNOSIS — A41.9 UNSPECIFIED SEPTICEMIA(038.9) (H): ICD-10-CM

## 2020-09-22 DIAGNOSIS — Z94.1 HEART TRANSPLANT, ORTHOTOPIC, STATUS (H): Primary | ICD-10-CM

## 2020-09-22 DIAGNOSIS — A41.9 SEPSIS (H): ICD-10-CM

## 2020-09-22 DIAGNOSIS — T81.44XA: ICD-10-CM

## 2020-09-22 DIAGNOSIS — T81.40XA POSTOPERATIVE INFECTION, UNSPECIFIED TYPE, INITIAL ENCOUNTER: ICD-10-CM

## 2020-09-22 DIAGNOSIS — A41.9 SEPSIS WITHOUT ACUTE ORGAN DYSFUNCTION, DUE TO UNSPECIFIED ORGANISM (H): ICD-10-CM

## 2020-09-22 LAB
ALBUMIN SERPL-MCNC: 2.5 G/DL (ref 3.4–5)
ALBUMIN UR-MCNC: 10 MG/DL
ALP SERPL-CCNC: 93 U/L (ref 40–150)
ALT SERPL W P-5'-P-CCNC: 26 U/L (ref 0–70)
ANION GAP SERPL CALCULATED.3IONS-SCNC: 7 MMOL/L (ref 3–14)
ANISOCYTOSIS BLD QL SMEAR: SLIGHT
APPEARANCE UR: CLEAR
APTT PPP: 31 SEC (ref 22–37)
AST SERPL W P-5'-P-CCNC: 17 U/L (ref 0–45)
BASOPHILS # BLD AUTO: 0 10E9/L (ref 0–0.2)
BASOPHILS NFR BLD AUTO: 0 %
BILIRUB SERPL-MCNC: 0.3 MG/DL (ref 0.2–1.3)
BILIRUB UR QL STRIP: NEGATIVE
BUN SERPL-MCNC: 43 MG/DL (ref 7–30)
CALCIUM SERPL-MCNC: 8 MG/DL (ref 8.5–10.1)
CHLORIDE SERPL-SCNC: 104 MMOL/L (ref 94–109)
CO2 SERPL-SCNC: 22 MMOL/L (ref 20–32)
COLOR UR AUTO: YELLOW
CREAT BLD-MCNC: 1.5 MG/DL (ref 0.66–1.25)
CREAT SERPL-MCNC: 1.5 MG/DL (ref 0.66–1.25)
CRP SERPL-MCNC: 16 MG/L (ref 0–8)
DACRYOCYTES BLD QL SMEAR: SLIGHT
DIFFERENTIAL METHOD BLD: ABNORMAL
DOHLE BOD BLD QL SMEAR: PRESENT
EOSINOPHIL # BLD AUTO: 0 10E9/L (ref 0–0.7)
EOSINOPHIL NFR BLD AUTO: 0 %
ERYTHROCYTE [DISTWIDTH] IN BLOOD BY AUTOMATED COUNT: 20 % (ref 10–15)
GFR SERPL CREATININE-BSD FRML MDRD: 47 ML/MIN/{1.73_M2}
GFR SERPL CREATININE-BSD FRML MDRD: 48 ML/MIN/{1.73_M2}
GLUCOSE BLDC GLUCOMTR-MCNC: 310 MG/DL (ref 70–99)
GLUCOSE BLDC GLUCOMTR-MCNC: 337 MG/DL (ref 70–99)
GLUCOSE SERPL-MCNC: 187 MG/DL (ref 70–99)
GLUCOSE UR STRIP-MCNC: 150 MG/DL
GRAM STN SPEC: NORMAL
GRAM STN SPEC: NORMAL
HCT VFR BLD AUTO: 23.5 % (ref 40–53)
HGB BLD-MCNC: 7.2 G/DL (ref 13.3–17.7)
HGB UR QL STRIP: NEGATIVE
INR PPP: 1.13 (ref 0.86–1.14)
INTERPRETATION ECG - MUSE: NORMAL
KETONES UR STRIP-MCNC: NEGATIVE MG/DL
LABORATORY COMMENT REPORT: NORMAL
LACTATE BLD-SCNC: 2.5 MMOL/L (ref 0.7–2)
LEUKOCYTE ESTERASE UR QL STRIP: NEGATIVE
LYMPHOCYTES # BLD AUTO: 0 10E9/L (ref 0.8–5.3)
LYMPHOCYTES NFR BLD AUTO: 0.9 %
Lab: NORMAL
MAGNESIUM SERPL-MCNC: 1.9 MG/DL (ref 1.6–2.3)
MCH RBC QN AUTO: 30.8 PG (ref 26.5–33)
MCHC RBC AUTO-ENTMCNC: 30.6 G/DL (ref 31.5–36.5)
MCV RBC AUTO: 100 FL (ref 78–100)
METAMYELOCYTES # BLD: 0 10E9/L
METAMYELOCYTES NFR BLD MANUAL: 0.9 %
MONOCYTES # BLD AUTO: 0.2 10E9/L (ref 0–1.3)
MONOCYTES NFR BLD AUTO: 5.3 %
MUCOUS THREADS #/AREA URNS LPF: PRESENT /LPF
NEUTROPHILS # BLD AUTO: 2.8 10E9/L (ref 1.6–8.3)
NEUTROPHILS NFR BLD AUTO: 92.9 %
NITRATE UR QL: NEGATIVE
NT-PROBNP SERPL-MCNC: 8792 PG/ML (ref 0–900)
PH UR STRIP: 5 PH (ref 5–7)
PLATELET # BLD AUTO: 187 10E9/L (ref 150–450)
PLATELET # BLD EST: ABNORMAL 10*3/UL
POIKILOCYTOSIS BLD QL SMEAR: ABNORMAL
POLYCHROMASIA BLD QL SMEAR: SLIGHT
POTASSIUM SERPL-SCNC: 4.2 MMOL/L (ref 3.4–5.3)
PROT SERPL-MCNC: 5.5 G/DL (ref 6.8–8.8)
RBC # BLD AUTO: 2.34 10E12/L (ref 4.4–5.9)
RBC #/AREA URNS AUTO: <1 /HPF (ref 0–2)
SARS-COV-2 RNA SPEC QL NAA+PROBE: NEGATIVE
SARS-COV-2 RNA SPEC QL NAA+PROBE: NORMAL
SODIUM SERPL-SCNC: 133 MMOL/L (ref 133–144)
SOURCE: ABNORMAL
SP GR UR STRIP: 1.02 (ref 1–1.03)
SPECIMEN SOURCE: NORMAL
TOXIC GRANULES BLD QL SMEAR: PRESENT
UROBILINOGEN UR STRIP-MCNC: NORMAL MG/DL (ref 0–2)
WBC # BLD AUTO: 3 10E9/L (ref 4–11)
WBC #/AREA URNS AUTO: 1 /HPF (ref 0–5)

## 2020-09-22 PROCEDURE — 83550 IRON BINDING TEST: CPT | Performed by: EMERGENCY MEDICINE

## 2020-09-22 PROCEDURE — 96372 THER/PROPH/DIAG INJ SC/IM: CPT | Mod: 59 | Performed by: EMERGENCY MEDICINE

## 2020-09-22 PROCEDURE — 87086 URINE CULTURE/COLONY COUNT: CPT | Performed by: EMERGENCY MEDICINE

## 2020-09-22 PROCEDURE — 87186 SC STD MICRODIL/AGAR DIL: CPT | Performed by: EMERGENCY MEDICINE

## 2020-09-22 PROCEDURE — 87070 CULTURE OTHR SPECIMN AEROBIC: CPT | Performed by: EMERGENCY MEDICINE

## 2020-09-22 PROCEDURE — 85610 PROTHROMBIN TIME: CPT | Performed by: EMERGENCY MEDICINE

## 2020-09-22 PROCEDURE — 93010 ELECTROCARDIOGRAM REPORT: CPT | Mod: Z6 | Performed by: EMERGENCY MEDICINE

## 2020-09-22 PROCEDURE — 93005 ELECTROCARDIOGRAM TRACING: CPT | Performed by: EMERGENCY MEDICINE

## 2020-09-22 PROCEDURE — 85025 COMPLETE CBC W/AUTO DIFF WBC: CPT | Performed by: INTERNAL MEDICINE

## 2020-09-22 PROCEDURE — 25000132 ZZH RX MED GY IP 250 OP 250 PS 637: Performed by: INTERNAL MEDICINE

## 2020-09-22 PROCEDURE — 25000128 H RX IP 250 OP 636: Performed by: INTERNAL MEDICINE

## 2020-09-22 PROCEDURE — 99223 1ST HOSP IP/OBS HIGH 75: CPT | Mod: GC | Performed by: INTERNAL MEDICINE

## 2020-09-22 PROCEDURE — 00000146 ZZHCL STATISTIC GLUCOSE BY METER IP

## 2020-09-22 PROCEDURE — 40000556 ZZH STATISTIC PERIPHERAL IV START W US GUIDANCE: Mod: ZF

## 2020-09-22 PROCEDURE — 96365 THER/PROPH/DIAG IV INF INIT: CPT

## 2020-09-22 PROCEDURE — 85045 AUTOMATED RETICULOCYTE COUNT: CPT | Performed by: INTERNAL MEDICINE

## 2020-09-22 PROCEDURE — 25000128 H RX IP 250 OP 636

## 2020-09-22 PROCEDURE — 83540 ASSAY OF IRON: CPT | Performed by: EMERGENCY MEDICINE

## 2020-09-22 PROCEDURE — 83880 ASSAY OF NATRIURETIC PEPTIDE: CPT | Performed by: EMERGENCY MEDICINE

## 2020-09-22 PROCEDURE — 74177 CT ABD & PELVIS W/CONTRAST: CPT

## 2020-09-22 PROCEDURE — 99291 CRITICAL CARE FIRST HOUR: CPT | Mod: 25 | Performed by: EMERGENCY MEDICINE

## 2020-09-22 PROCEDURE — U0003 INFECTIOUS AGENT DETECTION BY NUCLEIC ACID (DNA OR RNA); SEVERE ACUTE RESPIRATORY SYNDROME CORONAVIRUS 2 (SARS-COV-2) (CORONAVIRUS DISEASE [COVID-19]), AMPLIFIED PROBE TECHNIQUE, MAKING USE OF HIGH THROUGHPUT TECHNOLOGIES AS DESCRIBED BY CMS-2020-01-R: HCPCS | Performed by: EMERGENCY MEDICINE

## 2020-09-22 PROCEDURE — 84466 ASSAY OF TRANSFERRIN: CPT | Performed by: EMERGENCY MEDICINE

## 2020-09-22 PROCEDURE — 25000128 H RX IP 250 OP 636: Mod: ZF | Performed by: NURSE PRACTITIONER

## 2020-09-22 PROCEDURE — 82728 ASSAY OF FERRITIN: CPT | Performed by: EMERGENCY MEDICINE

## 2020-09-22 PROCEDURE — 87077 CULTURE AEROBIC IDENTIFY: CPT | Performed by: EMERGENCY MEDICINE

## 2020-09-22 PROCEDURE — 99285 EMERGENCY DEPT VISIT HI MDM: CPT | Mod: 25 | Performed by: EMERGENCY MEDICINE

## 2020-09-22 PROCEDURE — 86923 COMPATIBILITY TEST ELECTRIC: CPT | Performed by: STUDENT IN AN ORGANIZED HEALTH CARE EDUCATION/TRAINING PROGRAM

## 2020-09-22 PROCEDURE — 87181 SC STD AGAR DILUTION PER AGT: CPT | Performed by: EMERGENCY MEDICINE

## 2020-09-22 PROCEDURE — 96365 THER/PROPH/DIAG IV INF INIT: CPT | Mod: 59 | Performed by: EMERGENCY MEDICINE

## 2020-09-22 PROCEDURE — 80053 COMPREHEN METABOLIC PANEL: CPT | Performed by: EMERGENCY MEDICINE

## 2020-09-22 PROCEDURE — 21400000 ZZH R&B CCU UMMC

## 2020-09-22 PROCEDURE — 87205 SMEAR GRAM STAIN: CPT | Performed by: EMERGENCY MEDICINE

## 2020-09-22 PROCEDURE — 83735 ASSAY OF MAGNESIUM: CPT | Performed by: EMERGENCY MEDICINE

## 2020-09-22 PROCEDURE — 83605 ASSAY OF LACTIC ACID: CPT | Performed by: EMERGENCY MEDICINE

## 2020-09-22 PROCEDURE — 86901 BLOOD TYPING SEROLOGIC RH(D): CPT | Performed by: STUDENT IN AN ORGANIZED HEALTH CARE EDUCATION/TRAINING PROGRAM

## 2020-09-22 PROCEDURE — 83921 ORGANIC ACID SINGLE QUANT: CPT | Performed by: EMERGENCY MEDICINE

## 2020-09-22 PROCEDURE — C9803 HOPD COVID-19 SPEC COLLECT: HCPCS | Performed by: EMERGENCY MEDICINE

## 2020-09-22 PROCEDURE — 85730 THROMBOPLASTIN TIME PARTIAL: CPT | Performed by: EMERGENCY MEDICINE

## 2020-09-22 PROCEDURE — 87040 BLOOD CULTURE FOR BACTERIA: CPT | Performed by: EMERGENCY MEDICINE

## 2020-09-22 PROCEDURE — 96366 THER/PROPH/DIAG IV INF ADDON: CPT | Performed by: EMERGENCY MEDICINE

## 2020-09-22 PROCEDURE — 25000131 ZZH RX MED GY IP 250 OP 636 PS 637: Performed by: STUDENT IN AN ORGANIZED HEALTH CARE EDUCATION/TRAINING PROGRAM

## 2020-09-22 PROCEDURE — 25000131 ZZH RX MED GY IP 250 OP 636 PS 637: Performed by: INTERNAL MEDICINE

## 2020-09-22 PROCEDURE — 82746 ASSAY OF FOLIC ACID SERUM: CPT | Performed by: EMERGENCY MEDICINE

## 2020-09-22 PROCEDURE — 81001 URINALYSIS AUTO W/SCOPE: CPT | Performed by: EMERGENCY MEDICINE

## 2020-09-22 PROCEDURE — 82565 ASSAY OF CREATININE: CPT

## 2020-09-22 PROCEDURE — 82607 VITAMIN B-12: CPT | Performed by: EMERGENCY MEDICINE

## 2020-09-22 PROCEDURE — 86900 BLOOD TYPING SEROLOGIC ABO: CPT | Performed by: STUDENT IN AN ORGANIZED HEALTH CARE EDUCATION/TRAINING PROGRAM

## 2020-09-22 PROCEDURE — 96367 TX/PROPH/DG ADDL SEQ IV INF: CPT | Performed by: EMERGENCY MEDICINE

## 2020-09-22 PROCEDURE — 87076 CULTURE ANAEROBE IDENT EACH: CPT | Performed by: EMERGENCY MEDICINE

## 2020-09-22 PROCEDURE — 25800030 ZZH RX IP 258 OP 636: Performed by: INTERNAL MEDICINE

## 2020-09-22 PROCEDURE — 85025 COMPLETE CBC W/AUTO DIFF WBC: CPT | Performed by: EMERGENCY MEDICINE

## 2020-09-22 PROCEDURE — 86140 C-REACTIVE PROTEIN: CPT | Performed by: EMERGENCY MEDICINE

## 2020-09-22 PROCEDURE — 25000128 H RX IP 250 OP 636: Performed by: EMERGENCY MEDICINE

## 2020-09-22 PROCEDURE — 25800030 ZZH RX IP 258 OP 636: Performed by: EMERGENCY MEDICINE

## 2020-09-22 PROCEDURE — 86850 RBC ANTIBODY SCREEN: CPT | Performed by: STUDENT IN AN ORGANIZED HEALTH CARE EDUCATION/TRAINING PROGRAM

## 2020-09-22 RX ORDER — CLOTRIMAZOLE 10 MG/1
1 LOZENGE ORAL 4 TIMES DAILY
Status: DISCONTINUED | OUTPATIENT
Start: 2020-09-22 | End: 2020-10-05 | Stop reason: HOSPADM

## 2020-09-22 RX ORDER — MAGNESIUM SULFATE HEPTAHYDRATE 40 MG/ML
2 INJECTION, SOLUTION INTRAVENOUS DAILY PRN
Status: DISCONTINUED | OUTPATIENT
Start: 2020-09-22 | End: 2020-10-05 | Stop reason: HOSPADM

## 2020-09-22 RX ORDER — PIPERACILLIN SODIUM, TAZOBACTAM SODIUM 3; .375 G/15ML; G/15ML
3.38 INJECTION, POWDER, LYOPHILIZED, FOR SOLUTION INTRAVENOUS ONCE
Status: COMPLETED | OUTPATIENT
Start: 2020-09-22 | End: 2020-09-22

## 2020-09-22 RX ORDER — PIPERACILLIN SODIUM, TAZOBACTAM SODIUM 4; .5 G/20ML; G/20ML
4.5 INJECTION, POWDER, LYOPHILIZED, FOR SOLUTION INTRAVENOUS EVERY 6 HOURS
Status: COMPLETED | OUTPATIENT
Start: 2020-09-22 | End: 2020-09-25

## 2020-09-22 RX ORDER — CALCIUM CARBONATE/VITAMIN D3 500-10/5ML
400 LIQUID (ML) ORAL 2 TIMES DAILY
Qty: 180 CAPSULE | Refills: 3 | Status: SHIPPED | OUTPATIENT
Start: 2020-09-22 | End: 2021-11-17

## 2020-09-22 RX ORDER — TACROLIMUS 1 MG/1
4 CAPSULE ORAL
Status: DISCONTINUED | OUTPATIENT
Start: 2020-09-23 | End: 2020-09-26

## 2020-09-22 RX ORDER — TACROLIMUS 1 MG/1
3 CAPSULE ORAL
Status: DISCONTINUED | OUTPATIENT
Start: 2020-09-22 | End: 2020-09-24

## 2020-09-22 RX ORDER — POTASSIUM CL/LIDO/0.9 % NACL 10MEQ/0.1L
10 INTRAVENOUS SOLUTION, PIGGYBACK (ML) INTRAVENOUS
Status: DISCONTINUED | OUTPATIENT
Start: 2020-09-22 | End: 2020-10-05 | Stop reason: HOSPADM

## 2020-09-22 RX ORDER — POTASSIUM CHLORIDE 7.45 MG/ML
10 INJECTION INTRAVENOUS
Status: DISCONTINUED | OUTPATIENT
Start: 2020-09-22 | End: 2020-10-05 | Stop reason: HOSPADM

## 2020-09-22 RX ORDER — POTASSIUM CHLORIDE 750 MG/1
20-40 TABLET, EXTENDED RELEASE ORAL
Status: DISCONTINUED | OUTPATIENT
Start: 2020-09-22 | End: 2020-10-05 | Stop reason: HOSPADM

## 2020-09-22 RX ORDER — VALGANCICLOVIR 450 MG/1
450 TABLET, FILM COATED ORAL
Status: DISCONTINUED | OUTPATIENT
Start: 2020-09-23 | End: 2020-09-23

## 2020-09-22 RX ORDER — FONDAPARINUX SODIUM 7.5 MG/.6ML
7.5 INJECTION SUBCUTANEOUS EVERY 24 HOURS
Status: DISCONTINUED | OUTPATIENT
Start: 2020-09-22 | End: 2020-10-05 | Stop reason: HOSPADM

## 2020-09-22 RX ORDER — SULFAMETHOXAZOLE AND TRIMETHOPRIM 400; 80 MG/1; MG/1
1 TABLET ORAL DAILY
Status: DISCONTINUED | OUTPATIENT
Start: 2020-09-23 | End: 2020-09-22

## 2020-09-22 RX ORDER — IOPAMIDOL 755 MG/ML
104 INJECTION, SOLUTION INTRAVASCULAR ONCE
Status: COMPLETED | OUTPATIENT
Start: 2020-09-22 | End: 2020-09-22

## 2020-09-22 RX ORDER — PANTOPRAZOLE SODIUM 40 MG/1
40 TABLET, DELAYED RELEASE ORAL
Status: DISCONTINUED | OUTPATIENT
Start: 2020-09-23 | End: 2020-10-05 | Stop reason: HOSPADM

## 2020-09-22 RX ORDER — MAGNESIUM SULFATE HEPTAHYDRATE 40 MG/ML
4 INJECTION, SOLUTION INTRAVENOUS EVERY 4 HOURS PRN
Status: DISCONTINUED | OUTPATIENT
Start: 2020-09-22 | End: 2020-10-05 | Stop reason: HOSPADM

## 2020-09-22 RX ORDER — TAMSULOSIN HYDROCHLORIDE 0.4 MG/1
0.4 CAPSULE ORAL DAILY
Status: DISCONTINUED | OUTPATIENT
Start: 2020-09-23 | End: 2020-10-05 | Stop reason: HOSPADM

## 2020-09-22 RX ORDER — PREDNISONE 10 MG/1
10 TABLET ORAL
Status: DISCONTINUED | OUTPATIENT
Start: 2020-09-23 | End: 2020-09-29

## 2020-09-22 RX ORDER — POTASSIUM CHLORIDE 1.5 G/1.58G
20-40 POWDER, FOR SOLUTION ORAL
Status: DISCONTINUED | OUTPATIENT
Start: 2020-09-22 | End: 2020-10-05 | Stop reason: HOSPADM

## 2020-09-22 RX ORDER — MAGNESIUM OXIDE 400 MG/1
400 TABLET ORAL 2 TIMES DAILY
Status: DISCONTINUED | OUTPATIENT
Start: 2020-09-22 | End: 2020-10-05 | Stop reason: HOSPADM

## 2020-09-22 RX ORDER — INSULIN LISPRO 100 [IU]/ML
INJECTION, SOLUTION INTRAVENOUS; SUBCUTANEOUS
Status: ON HOLD | COMMUNITY
End: 2020-10-05

## 2020-09-22 RX ORDER — DEXTROSE MONOHYDRATE 25 G/50ML
25-50 INJECTION, SOLUTION INTRAVENOUS
Status: DISCONTINUED | OUTPATIENT
Start: 2020-09-22 | End: 2020-10-05 | Stop reason: HOSPADM

## 2020-09-22 RX ORDER — MAGNESIUM SULFATE HEPTAHYDRATE 40 MG/ML
2 INJECTION, SOLUTION INTRAVENOUS ONCE
Status: COMPLETED | OUTPATIENT
Start: 2020-09-22 | End: 2020-09-22

## 2020-09-22 RX ORDER — NICOTINE POLACRILEX 4 MG
15-30 LOZENGE BUCCAL
Status: DISCONTINUED | OUTPATIENT
Start: 2020-09-22 | End: 2020-10-05 | Stop reason: HOSPADM

## 2020-09-22 RX ORDER — ROSUVASTATIN CALCIUM 10 MG/1
10 TABLET, COATED ORAL DAILY
Status: DISCONTINUED | OUTPATIENT
Start: 2020-09-23 | End: 2020-10-05 | Stop reason: HOSPADM

## 2020-09-22 RX ORDER — POTASSIUM CHLORIDE 29.8 MG/ML
20 INJECTION INTRAVENOUS
Status: DISCONTINUED | OUTPATIENT
Start: 2020-09-22 | End: 2020-10-05 | Stop reason: HOSPADM

## 2020-09-22 RX ORDER — MAGNESIUM SULFATE HEPTAHYDRATE 40 MG/ML
2 INJECTION, SOLUTION INTRAVENOUS ONCE
Status: CANCELLED
Start: 2020-09-22

## 2020-09-22 RX ORDER — POLYETHYLENE GLYCOL 3350 17 G/17G
17 POWDER, FOR SOLUTION ORAL DAILY PRN
Status: DISCONTINUED | OUTPATIENT
Start: 2020-09-22 | End: 2020-10-05 | Stop reason: HOSPADM

## 2020-09-22 RX ORDER — AMOXICILLIN 250 MG
1 CAPSULE ORAL 2 TIMES DAILY PRN
Status: DISCONTINUED | OUTPATIENT
Start: 2020-09-22 | End: 2020-10-05 | Stop reason: HOSPADM

## 2020-09-22 RX ORDER — ACETAMINOPHEN 325 MG/1
975 TABLET ORAL EVERY 8 HOURS PRN
Status: DISCONTINUED | OUTPATIENT
Start: 2020-09-22 | End: 2020-09-24

## 2020-09-22 RX ORDER — SULFAMETHOXAZOLE AND TRIMETHOPRIM 400; 80 MG/1; MG/1
1 TABLET ORAL DAILY
Status: DISCONTINUED | OUTPATIENT
Start: 2020-09-23 | End: 2020-10-02

## 2020-09-22 RX ORDER — MYCOPHENOLATE MOFETIL 250 MG/1
750 CAPSULE ORAL 2 TIMES DAILY
Status: DISCONTINUED | OUTPATIENT
Start: 2020-09-22 | End: 2020-10-02

## 2020-09-22 RX ORDER — PREDNISONE 5 MG/1
5 TABLET ORAL EVERY EVENING
Status: DISCONTINUED | OUTPATIENT
Start: 2020-09-22 | End: 2020-10-05 | Stop reason: HOSPADM

## 2020-09-22 RX ADMIN — VANCOMYCIN HYDROCHLORIDE 1500 MG: 10 INJECTION, POWDER, LYOPHILIZED, FOR SOLUTION INTRAVENOUS at 14:03

## 2020-09-22 RX ADMIN — PIPERACILLIN SODIUM AND TAZOBACTAM SODIUM 4.5 G: 4; .5 INJECTION, POWDER, LYOPHILIZED, FOR SOLUTION INTRAVENOUS at 22:32

## 2020-09-22 RX ADMIN — INSULIN ASPART 4 UNITS: 100 INJECTION, SOLUTION INTRAVENOUS; SUBCUTANEOUS at 21:39

## 2020-09-22 RX ADMIN — SODIUM CHLORIDE 1000 ML: 9 INJECTION, SOLUTION INTRAVENOUS at 12:44

## 2020-09-22 RX ADMIN — HYDRALAZINE HYDROCHLORIDE 75 MG: 25 TABLET ORAL at 20:14

## 2020-09-22 RX ADMIN — TACROLIMUS 3 MG: 1 CAPSULE ORAL at 20:14

## 2020-09-22 RX ADMIN — Medication 1 TABLET: at 20:15

## 2020-09-22 RX ADMIN — PIPERACILLIN SODIUM AND TAZOBACTAM SODIUM 3.38 G: 3; .375 INJECTION, POWDER, LYOPHILIZED, FOR SOLUTION INTRAVENOUS at 16:32

## 2020-09-22 RX ADMIN — MAGNESIUM SULFATE IN WATER 2 G: 40 INJECTION, SOLUTION INTRAVENOUS at 10:39

## 2020-09-22 RX ADMIN — PREDNISONE 5 MG: 5 TABLET ORAL at 20:15

## 2020-09-22 RX ADMIN — MAGNESIUM OXIDE 400 MG: 400 TABLET ORAL at 20:15

## 2020-09-22 RX ADMIN — SODIUM CHLORIDE 500 ML: 9 INJECTION, SOLUTION INTRAVENOUS at 13:55

## 2020-09-22 RX ADMIN — CLOTRIMAZOLE 1 LOZENGE: 10 LOZENGE ORAL at 20:15

## 2020-09-22 RX ADMIN — MYCOPHENOLATE MOFETIL 750 MG: 250 CAPSULE ORAL at 20:13

## 2020-09-22 RX ADMIN — IOPAMIDOL 104 ML: 755 INJECTION, SOLUTION INTRAVENOUS at 14:10

## 2020-09-22 NOTE — ED NOTES
[facility], [place]   ED Nurse to Floor Handoff     Lucian Henderson Sr. is a 64 year old male who speaks Welsh and lives with a spouse,  in a home  They arrived in the ED by car from clinic    ED Chief Complaint: Wound Infection    ED Dx;   Final diagnoses:   Postoperative infection, unspecified type, initial encounter   Sepsis without acute organ dysfunction, due to unspecified organism (H)         Needed?: Yes    Allergies:   Allergies   Allergen Reactions     Heparin Heparin Induced Thrombocytopenia     HIT screen sent 7/18/2020. CORRINE confirmed positive   .  Past Medical Hx:   Past Medical History:   Diagnosis Date     CAD (coronary artery disease)      CHF (congestive heart failure) (H)      CKD (chronic kidney disease), stage III (H)      Cortical cataract of both eyes      Diabetes (H)      Hyperlipidemia      Hypertension      Ischemic cardiomyopathy      Obesity      CHANTEL (obstructive sleep apnea)     occas cpap     Osteoarthritis       Baseline Mental status: WDL  Current Mental Status changes: at basesline    Infection present or suspected this encounter: yes skin/wound/contact  Sepsis suspected: Yes  Isolation type: No active isolations     Activity level - Baseline/Home:  Independent  Activity Level - Current:   Stand with Assist    Bariatric equipment needed?: No    In the ED these meds were given:   Medications   sodium chloride (PF) 0.9% PF flush 3 mL (has no administration in time range)   sodium chloride (PF) 0.9% PF flush 3 mL (has no administration in time range)   piperacillin-tazobactam (ZOSYN) 3.375 g vial to attach to  mL bag (has no administration in time range)   vancomycin 1500 mg in 0.9% NaCl 250 ml intermittent infusion 1,500 mg (1,500 mg Intravenous Given 9/22/20 1403)   0.9% sodium chloride BOLUS (0 mLs Intravenous Stopped 9/22/20 1510)   iopamidol (ISOVUE-370) solution 104 mL (104 mLs Intravenous Given 9/22/20 1410)   sodium  chloride (PF) 0.9% PF flush 76 mL (76 mLs Intravenous Given 9/22/20 1410)       Drips running?  Yes    Home pump  No    Current LDAs  Peripheral IV 09/22/20 Right Upper arm (Active)   Number of days: 0       Peripheral IV 09/22/20 Left Upper forearm (Active)   Number of days: 0       Incision/Surgical Site 07/16/20 Right Chest (Active)   Number of days: 68       Incision/Surgical Site 07/19/20 Midline Chest (Active)   Number of days: 65       Incision/Surgical Site 07/29/20 Anterior;Upper Abdomen (Active)   Number of days: 55       Labs results:   Labs Ordered and Resulted from Time of ED Arrival Up to the Time of Departure from the ED   COMPREHENSIVE METABOLIC PANEL - Abnormal; Notable for the following components:       Result Value    Glucose 187 (*)     Urea Nitrogen 43 (*)     Creatinine 1.50 (*)     GFR Estimate 48 (*)     GFR Estimate If Black 55 (*)     Calcium 8.0 (*)     Albumin 2.5 (*)     Protein Total 5.5 (*)     All other components within normal limits   CBC WITH PLATELETS DIFFERENTIAL - Abnormal; Notable for the following components:    WBC 3.0 (*)     RBC Count 2.34 (*)     Hemoglobin 7.2 (*)     Hematocrit 23.5 (*)     MCHC 30.6 (*)     RDW 20.0 (*)     Absolute Lymphocytes 0.0 (*)     All other components within normal limits   LACTATE FOR SEPSIS PROTOCOL - Abnormal; Notable for the following components:    Lactate for Sepsis Protocol 2.5 (*)     All other components within normal limits   CRP INFLAMMATION - Abnormal; Notable for the following components:    CRP Inflammation 16.0 (*)     All other components within normal limits   CREATININE POCT - Abnormal; Notable for the following components:    Creatinine 1.5 (*)     GFR Estimate 47 (*)     GFR Estimate If Black 57 (*)     All other components within normal limits   ROUTINE UA WITH MICROSCOPIC   INR   PARTIAL THROMBOPLASTIN TIME   NT PROBNP INPATIENT   COVID-19 VIRUS (CORONAVIRUS) BY PCR   PERIPHERAL IV CATHETER   PULSE OXIMETRY NURSING    CARDIAC CONTINUOUS MONITORING   ISTAT CREATININE NURSING POCT   NURSING DRAW AND HOLD   BLOOD CULTURE   URINE CULTURE AEROBIC BACTERIAL   BLOOD CULTURE   WOUND CULTURE AEROBIC BACTERIAL   GRAM STAIN       Imaging Studies: No results found for this or any previous visit (from the past 24 hour(s)).    Recent vital signs:   /71   Pulse 99   Temp 99.5  F (37.5  C) (Oral)   Resp 16   SpO2 98%     [place] Coma Scale Score: 15 (09/22/20 1235)       Cardiac Rhythm: Tachycardia  Pt needs tele? Yes  Skin/wound Issues: surgical wounds    Code Status: Full Code    Pain control: fair    Nausea control: pt had none    Abnormal labs/tests/findings requiring intervention: lactic, ct pending    Family present during ED course? Yes   Family Comments/Social Situation comments: Latvian speaking, declined  initially    Tasks needing completion: None    Austyn Doherty RN  [facility] -- [phone] [place] ED  [phone] [facility] ED

## 2020-09-22 NOTE — PROGRESS NOTES
"Infusion nursing note:    Lucian Oliveira Santiago Rivas. presents today to [facility] for a magnesium infusion.  During today's [facility] appointment orders from [facility] were completed.  Frequency: one time    Progress note:  ID verified by name and .  Assessment completed.  Vitals were stable throughout time in [facility].  verbal education given to patient/representative regarding infusion and possible side effects.  Patient verbalized understanding.    Note: Pt c/o pain in abdomen and chest. Left upper chest had old tunneled line site with an opening approx 1\" round with a puss like substance in the wound and draining small amount of clear fluid.  At surgical site Mid upper abdomen old surgical site red, swollen and warm.    Dr. Meng notified of low grade temp as well as wounds.  TORB: send to Emergency department after magnesium infusion.  Okay for daughter to transport pt to ED.    Infusion given over approximately  1 hour     Administrations This Visit     magnesium sulfate 2 g in water intermittent infusion     Admin Date  2020 Action  New Bag Dose  2 g Rate  50 mL/hr Route  Intravenous Administered By  Mayra Arcos RN                BP (!) (P) 142/72   Pulse (P) 99   Temp (P) 99.1  F (37.3  C) (Oral)   Resp (P) 16     Discharge plan:    Discharge instructions were reviewed with patient: Yes  Patient/representative verbalized understanding of discharge instructions and all questions answered: Yes.    Discharged from [facility] with wife.  Daughter picking pt up to go to the [place] ED.  Report called to ED.    Estephanie Webb RN        "

## 2020-09-22 NOTE — ED TRIAGE NOTES
H/o T2DM, CKD, ischeic heart disease, heart transplant 2 months ago  followup in clinic today, concern for infection  C/o bilateral leg weakness   pain to abdomen  Wounds are red with purulent drainage

## 2020-09-22 NOTE — PHARMACY-VANCOMYCIN DOSING SERVICE
Pharmacy Vancomycin Initial Note  Date of Service 2020  Patient's  1955  64 year old, male    Indication: Sepsis and Skin and Soft Tissue Infection    Current estimated CrCl = Estimated Creatinine Clearance: 46.4 mL/min (A) (based on SCr of 1.5 mg/dL (H)).    Creatinine for last 3 days  2020: 10:17 AM Creatinine 1.57 mg/dL  2020: 12:56 PM Creatinine 1.50 mg/dL    Recent Vancomycin Level(s) for last 3 days  No results found for requested labs within last 72 hours.      Vancomycin IV Administrations (past 72 hours)      No vancomycin orders with administrations in past 72 hours.                Nephrotoxins and other renal medications (From now, onward)    Start     Dose/Rate Route Frequency Ordered Stop    20 1335  piperacillin-tazobactam (ZOSYN) 3.375 g vial to attach to  mL bag      3.375 g  over 30 Minutes Intravenous ONCE 20 1334            Contrast Orders - past 72 hours (72h ago, onward)    None                Plan:  1.  Start vancomycin  1500 mg (~19.5 mg/kg) IV q24h.   2.  Goal Trough Level: 15-20 mg/L   3.  Pharmacy will check trough levels as appropriate in 1-3 Days.    4. Serum creatinine levels will be ordered daily for the first week of therapy and at least twice weekly for subsequent weeks.    5. [place] method utilized to dose vancomycin therapy: Method 2    Rangel Olmstead, MertD

## 2020-09-22 NOTE — ED NOTES
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      Maribell Cowart MD  09/22/20 1231    "

## 2020-09-22 NOTE — TELEPHONE ENCOUNTER
Received call from Dr. hSeridan.  [facility] called MD to report that pt has a fever, and that his old subclavian site looks swollen and red with drainage.  Plan for [facility] staff to take pt to ED to be evaluated and possibly admitted.  Pt's daughter, Elisabeth, updated.  She will call her mom, who is with pt now.

## 2020-09-22 NOTE — PHARMACY-ADMISSION MEDICATION HISTORY
Admission medication history interview status for the 9/22/2020 admission is complete. See Epic admission navigator for allergy information, pharmacy, prior to admission medications and immunization status.     Medication history interview sources:  Patient, Surescripts, Epic Discharge Note ([facility] 8/24/2020)    Changes made to PTA medication list (reason)  Added:   1.) Humalog Pen 100 unit/mL pen: inject 15 - 31 units with each meal.  Deleted:   1.) Novolog  Pen 100 unit/mL pen: inject 15 - 31 units with each meal.  Changed: N/A    Additional medication history information (including reliability of information, actions taken by pharmacist):  1.) Patient was a good historian; he knew his medications by name, directions, and when most of them were last taken.       Prior to Admission medications    Medication Sig Last Dose Taking? Auth Provider   acetaminophen (TYLENOL) 325 MG tablet Take 3 tablets (975 mg) by mouth every 8 hours as needed for mild pain 9/21/2020 Yes Arvin Sheridan MD   calcium carbonate 600 mg-vitamin D 400 units (CALTRATE) 600-400 MG-UNIT per tablet Take 1 tablet by mouth 2 times daily (with meals) 9/22/2020 at AM Yes Arvin Sheridan MD   clotrimazole (MYCELEX) 10 MG lozenge Place 1 lozenge inside cheek 4 times daily 9/22/2020 Yes Jyotsna Andino APRN CNP   fondaparinux ANTICOAGULANT (ARIXTRA) 7.5MG/0.6ML injection Inject 0.6 mLs (7.5 mg) Subcutaneous every 24 hours Hold dose on the mornings of your right heart catheterization and biopsies 9/21/2020 Yes Jyotsna Andino APRN CNP   hydrALAZINE (APRESOLINE) 25 MG tablet Take 3 tablets (75 mg) by mouth every 8 hours 9/22/2020 at AM Yes Donald Rand PA-C   insulin isophane human (HUMULIN N PEN) 100 UNIT/ML injection Inject 35 Units Subcutaneous every morning 9/22/2020 at AM Yes Marisabel Prieto PA-C   insulin lispro (HUMALOG KWIKPEN) 100 UNIT/ML (1 unit dial) KWIKPEN Inject 15 - 31 units with each meal 9/22/2020 Yes Unknown, Entered  By History   magnesium oxide 400 MG CAPS Take 400 mg by mouth 2 times daily 9/22/2020 Yes Julia Berger APRN CNP   mycophenolate (GENERIC EQUIVALENT) 250 MG capsule TAKE 6 CAPSULES BY MOUTH TWICE DAILY 9/22/2020 at AM Yes Arvin Sheridan MD   pantoprazole (PROTONIX) 40 MG EC tablet Take 1 tablet (40 mg) by mouth every morning (before breakfast) 9/22/2020 at AM Yes Arvin Sheridan MD   polyethylene glycol (MIRALAX) 17 g packet 17 g by Oral or Feeding Tube route daily as needed for constipation Past Month Yes Jyotsna Andino APRN CNP   predniSONE (DELTASONE) 10 MG tablet Take 1 tablet (10 mg) by mouth every morning AND 0.5 tablets (5 mg) every evening. 9/22/2020 at AM Yes Arvin Sheridan MD   rosuvastatin (CRESTOR) 10 MG tablet Take 1 tablet (10 mg) by mouth daily 9/22/2020 at AM Yes Arvin Sheridan MD   senna-docusate (SENOKOT-S/PERICOLACE) 8.6-50 MG tablet Take 1 tablet by mouth 2 times daily as needed for constipation Past Month Yes Donald Rand PA-C   sulfamethoxazole-trimethoprim (BACTRIM) 400-80 MG tablet Take 1 tablet by mouth daily 9/22/2020 at AM Yes Arvin Sheridan MD   tacrolimus (GENERIC EQUIVALENT) 1 MG capsule Take 4 capsules (4 mg) by mouth every morning AND 3 capsules (3 mg) every evening. Or as directed by transplant clinic. 9/22/2020 at AM Yes Arvin Sheridan MD   tamsulosin (FLOMAX) 0.4 MG capsule Take 1 capsule (0.4 mg) by mouth daily 9/22/2020 Yes Arvin Sheridan MD   valGANciclovir (VALCYTE) 450 MG tablet Take 1 tablet (450 mg) by mouth every 48 hours 9/21/2020 Yes Jyotsna Andino APRN CNP   Alcohol Swabs PADS Use to swab the area of the injection or sergio as directed   Per insurance coverage   Donald aRnd PA-C   blood glucose (ACCU-CHEK SMARTVIEW) test strip USE TO TEST THREE TIMES DAILY AS DIRECTED   Anna Archuleta PA-C   blood glucose (NO BRAND SPECIFIED) test strip Use to test blood sugar 2 times daily or as directed.    Marisabel Prieto PA-C   blood glucose monitoring (ACCU-CHEK FELIPE SMARTVIEW) meter device kit Use to test blood sugar 3-4 times daily, as directed.   Anna Archuleta PA-C   blood glucose monitoring (ONE TOUCH DELICA) lancets Use to test blood sugars 2 times daily.   Suzy Zarco PA-C   Continuous Blood Gluc Sensor (FREESTYLE JEREMY 14 DAY SENSOR) MISC Change every 14 days.   Marisabel Prieto PA-C   Continuous Blood Gluc Sensor (FREESTYLE JEREMY 14 DAY SENSOR) MISC Change every 14 days.   Marisabel Prieto PA-C   insulin pen needle (32G X 4 MM) 32G X 4 MM miscellaneous Use as directed by provider  Per insurance coverage   Donald Rand PA-C   insulin pen needle (B-D U/F) 31G X 5 MM miscellaneous 1 Units by Device route 2 times daily Use 2 pen needles daily or as directed.   Arvin Sheridan MD   insulin pen needle (NOVOFINE) 30G X 8 MM miscellaneous Inject 1 Box Subcutaneous 6 times daily Use 6 pen needles daily or as directed.   Marisabel Prieto PA-C   order for DME Auto CPAP 8-15 cmH20   Kai Valdez MD           Medication history completed by: Luis Ornelas, PD3 Pharmacy Intern

## 2020-09-22 NOTE — ED PROVIDER NOTES
[place] EMERGENCY DEPARTMENT ([facility])  9/22/20   ED 29  1:10 PM   History     Chief Complaint   Patient presents with     Wound Infection     The history is provided by the patient and medical records.     Lucian Henderson Sr. is a 64 year old male with history of ischemic cardiomyopathy CHF status post heart transplant on 7/19/20, diabetes, CKD, HTN, HLD who presents for further evaluation of redness, tenderness and drainage from surgical sites. Patient states he developed discharge and pain from surgical site 4 days ago. He was seen in [facility] today and they noted that his old subclavian site was swollen and red with drainage and his abdominal incision appeared this way as well. He was sent to the Emergency Department for evaluation.  No fevers at home. Had temperature of 99.1  F at clinic today. Has generalized weakness without focal weakness. He has pain focal around incision sites, especially around navel. No nausea, vomiting, diarrhea. He has had some bruising in his right lower extremity since his last hospitalization which is unchanged. Was sent for clinic for likely admission for concern for post operative infection. He reports he has been taking his immunosuppressants. No cough. He has had leg swelling since his transplant. He currently denies shortness of breath. He had gone to clinic originally to have magnesium infusion. He still has some sutures in place in these surgical sites. He is uncertain when they were supposed to be removed. He denies any known COVID 19 exposure.     I have reviewed the Medications, Allergies, Past Medical and Surgical History, and Social History in the [employer] system.  Past Medical History:   Diagnosis Date     CAD (coronary artery disease)      CHF (congestive heart failure) (H)      CKD (chronic kidney disease), stage III (H)      Cortical cataract of both eyes      Diabetes (H)      Hyperlipidemia      Hypertension      Ischemic cardiomyopathy      Obesity       CHANTEL (obstructive sleep apnea)     occas cpap     Osteoarthritis        Past Surgical History:   Procedure Laterality Date     C CABG, ARTERY-VEIN, THREE  02/2008     CATARACT IOL, RT/LT       COLONOSCOPY N/A 8/7/2019    Procedure: COLONOSCOPY, WITH POLYPECTOMY AND BIOPSY;  Surgeon: Chauncey Morataya MD;  Location:  GI     CV ANGIOGRAM CORONARY GRAFT N/A 7/2/2020    Procedure: Angiogram Coronary Graft;  Surgeon: Alex Lantigua MD;  Location:  HEART CARDIAC CATH LAB     CV CORONARY ANGIOGRAM N/A 7/2/2020    Procedure: CV CORONARY ANGIOGRAM;  Surgeon: Alex Lantigua MD;  Location:  HEART CARDIAC CATH LAB     CV HEART BIOPSY N/A 7/27/2020    Procedure: Heart Biopsy;  Surgeon: Mario Burr MD;  Location:  HEART CARDIAC CATH LAB     CV HEART BIOPSY N/A 8/3/2020    Procedure: CV HEART BIOPSY;  Surgeon: Alex Lantigua MD;  Location:  HEART CARDIAC CATH LAB     CV HEART BIOPSY N/A 8/10/2020    Procedure: CV HEART BIOPSY;  Surgeon: Mario Burr MD;  Location:  HEART CARDIAC CATH LAB     CV HEART BIOPSY N/A 8/17/2020    Procedure: CV HEART BIOPSY;  Surgeon: Raimundo Hudson MD;  Location:  HEART CARDIAC CATH LAB     CV HEART BIOPSY N/A 8/31/2020    Procedure: CV HEART BIOPSY;  Surgeon: Moises Santos MD;  Location:  HEART CARDIAC CATH LAB     CV HEART BIOPSY N/A 9/14/2020    Procedure: CV HEART BIOPSY;  Surgeon: Raimundo Hudson MD;  Location:  HEART CARDIAC CATH LAB     CV INTRA-AORTIC BALLOON PUMP INSERTION N/A 7/14/2020    Procedure: RHC WITH LEAVE IN SWAN/IABP;  Surgeon: Sonny Saleh MD;  Location:  HEART CARDIAC CATH LAB     CV RIGHT HEART CATH N/A 3/25/2019    Procedure: CV RIGHT HEART CATH;  Surgeon: Moises Santos MD;  Location:  HEART CARDIAC CATH LAB     CV RIGHT HEART CATH N/A 7/10/2019    Procedure: CV RIGHT HEART CATH;  Surgeon: Jak Mccabe MD;  Location:  HEART CARDIAC CATH LAB     CV RIGHT HEART CATH  N/A 7/8/2020    Procedure: Right Heart Cath with Leave In [place] already has ICU load;  Surgeon: Jak Mccabe MD;  Location:  HEART CARDIAC CATH LAB     CV RIGHT HEART CATH N/A 7/14/2020    Procedure: CV RIGHT HEART CATH;  Surgeon: Sonny Saleh MD;  Location:  HEART CARDIAC CATH LAB     CV RIGHT HEART CATH N/A 7/2/2020    Procedure: CV RIGHT HEART CATH;  Surgeon: Alex Lantigua MD;  Location:  HEART CARDIAC CATH LAB     CV RIGHT HEART CATH N/A 7/27/2020    Procedure: Right Heart Cath;  Surgeon: Mario Burr MD;  Location:  HEART CARDIAC CATH LAB     CV RIGHT HEART CATH N/A 8/3/2020    Procedure: Right Heart Cath;  Surgeon: Alex Lantigua MD;  Location:  HEART CARDIAC CATH LAB     CV RIGHT HEART CATH N/A 8/10/2020    Procedure: CV RIGHT HEART CATH;  Surgeon: Mario Burr MD;  Location:  HEART CARDIAC CATH LAB     CV RIGHT HEART CATH N/A 8/17/2020    Procedure: CV RIGHT HEART CATH;  Surgeon: Raimundo Hudson MD;  Location:  HEART CARDIAC CATH LAB     CV RIGHT HEART CATH N/A 8/31/2020    Procedure: CV RIGHT HEART CATH;  Surgeon: Moises Santos MD;  Location:  HEART CARDIAC CATH LAB     CV RIGHT HEART CATH N/A 9/14/2020    Procedure: CV RIGHT HEART CATH;  Surgeon: Raimundo Hudson MD;  Location:  HEART CARDIAC CATH LAB     EXTRACTION(S) DENTAL Left 7/13/2020    Procedure: EXTRACTION, TOOTH #11, 12, 13, 15, and 29;  Surgeon: Monica Chao DDS;  Location: [facility]     IMPLANT AUTOMATIC IMPLANTABLE CARDIOVERTER DEFIBRILLATOR       INSERT INTRAAORTIC BALLOON PUMP N/A 7/16/2020    Procedure: Subclavian Intra-Aortic Balloon Pump Placement;  Surgeon: Allan Sparrow MD;  [facility]     PHACOEMULSIFICATION CLEAR CORNEA WITH STANDARD IOL, VITRECTOMY PARSPLANA 25 GAGUE, COMBINED Left 12/10/2019    Procedure: PHACOEMULSIFICATION, CATARACT, CLEAR CORNEAL INCISION APPROACH, W STD INTRAOCULAR LENS IMPLANT INSERT + VITRECTOMY BY PARS PLANA   USING 25-GAUGE INSTRUMENTS. ENDOLASER, INFUSION OF 20% SF6 GAS;  Surgeon: Triny Bunch MD;  Location: [facility] OR     PICC DOUBLE LUMEN PLACEMENT Left 07/28/2020    5Fr - 42cm, Basilic vein, mid SVC     PICC INSERTION Right 07/11/2020    basilic 44 cm total      TRANSPLANT HEART RECIPIENT N/A 7/19/2020    Procedure: REDO MEDIAN STERNOTOMY, TRANSPLANT, ORTHOTOPIC HEART, RECIPIENT, ON PUMP OXYGENATOR, REMOVAL OF CARDIAC DEFIBRILLATOR AND LEAD;  Surgeon: Griselli, Massimo, MD;  Location: [facility] OR     Past medical history, past surgical history, medications, and allergies were reviewed with the patient. Additional pertinent items: None    Family History   Problem Relation Age of Onset     Diabetes Brother      Diabetes Sister      Diabetes Sister      Macular Degeneration No family hx of      Glaucoma No family hx of      Myocardial Infarction No family hx of      Kidney Disease No family hx of        Social History     Tobacco Use     Smoking status: Never Smoker     Smokeless tobacco: Never Used     Tobacco comment: Never smoked; non-smoking household   Substance Use Topics     Alcohol use: No     Alcohol/week: 0.0 standard drinks      Social history was reviewed with the patient. Additional pertinent items: None    REVIEW OF SYSTEMS  Pertinent positives and negatives are documented in the HPI. All other systems reviewed and are negative.    Physical Exam   BP: (!) 146/71  Pulse: 94  Temp: 99.9  F (37.7  C)  Resp: 20  SpO2: 96 %      Physical Exam  Constitutional:       Appearance: He is chronically ll-appearing. He is not diaphoretic.   HENT:      Head: Normocephalic and atraumatic.      Nose: No rhinorrhea.   Eyes: EOM intact   Cardiovascular:      Rate and Rhythm: Normal rate and regular rhythm.      Pulses: Normal pulses.      Heart sounds: No murmur. No friction rub. No gallop.    Pulmonary:      Effort: No respiratory distress.      Breath sounds: No stridor. Wheezing present. No rhonchi.   Chest:      Chest  wall: Tenderness present at prior subclavian intervention site (see skin changes below)  Abdominal:      General: Bowel sounds are normal.      Tenderness: At surgical incision site in epigastrium (skin changes described below). No other abdominal tenderness. There is no guarding or rebound.   Musculoskeletal:      Right lower leg: Edema present.      Left lower leg: Edema present.      Comments: Bilateral pitting pedal edema up to the knees.   Lymphadenopathy:      Cervical: No cervical adenopathy.   Skin:     General: Skin is warm.      Coloration: Skin is not jaundiced.      Findings: Bruising present.      Comments: ~4cm purulent incisional ulcer w/ perilesional erythema and tenderness in left clavicle area with some sutures in place; 2-3 cm ovoid epigastric ulcer, stained violet.   Bruising over right calf and posterio-medial right thigh.   Neurological:      General: No focal deficit present.      Mental Status: He is oriented to person, place, and time. Awake and alert.   Psychiatric:         Mood and Affect: Mood normal.  ED Course     1:09 PM The patient was seen and examined by Dr. Cowart in ED 29.         Procedures             EKG Interpretation:      Interpreted by Maribell Cowart MD  Time reviewed: 1:31 pm  Symptoms at time of EKG: infection, recent heart transplant   Rhythm: normal sinus with short NC  Rate: Normal  Axis: Normal  Ectopy: none  Conduction: short NC  ST Segments/ T Waves: No ST-T wave changes  Q Waves: none  Comparison to prior: Unchanged    Clinical Impression: no acute changes                   Critical Care Addendum    My initial assessment, based on my review of nursing observations, review of vital signs, focused history, physical exam and interpretation of laboratory studies , established that Lucian Daleluther Marks has suspicion for sepsis and need for evaluation and early goal-directed therapy, which requires immediate intervention, and therefore he is critically ill.      After the initial assessment, the care team initiated multiple lab tests, initiated IV fluid administration, initiated medication therapy with IV antibiotics and consulted with Advanced heart failure team to provide stabilization care. Due to the critical nature of this patient, I reassessed nursing observations, vital signs, physical exam, mental status and respiratory status multiple times prior to his disposition.     Time also spent performing documentation, reviewing test results, discussion with consultants and coordination of care.     Critical care time (excluding teaching time and procedures): 45 minutes.     The patient has signs of Severe Sepsis        If one the following conditions is present, a 30 mL/kg bolus is recommended as part of the 6 hour bundle (IBW can be used for BMI >30, or document refusal/contraindication):      1.   Initial hypotension  defined as 2 bps < 90 or map < 65 in the 6hrs before or 6hrs after time zero.     2.  Lactate >4.     The patient has signs of Severe Sepsis as evidenced by:    1. 2 SIRS criteria, AND  2. Suspected infection, AND   3. Organ dysfunction: Lactic Acidosis with value >2.0    Time severe sepsis diagnosis confirmed: 1256  09/22/20 as this was the time when Lactate resulted, and the level was > 2.0 (2.5)    3 Hour Severe Sepsis Bundle Completion:    1. Initial Lactic Acid Result:   Recent Labs   Lab Test 09/23/20  1147 07/22/20  1608 07/22/20  0845   LACT 1.3 1.0 0.9     2. Blood Cultures before Antibiotics: Yes  3. Broad Spectrum Antibiotics Administered:  yes       Anti-infectives (From admission through now)    Start     Dose/Rate Route Frequency Ordered Stop    09/22/20 2230  piperacillin-tazobactam (ZOSYN) 4.5 g vial to attach to  mL bag      4.5 g  over 1 Hours Intravenous EVERY 6 HOURS 09/22/20 1902 09/25/20 2359    09/22/20 1335  piperacillin-tazobactam (ZOSYN) 3.375 g vial to attach to  mL bag      3.375 g  over 30 Minutes Intravenous  ONCE 09/22/20 1334 09/22/20 1812          4. Fluid volume administered in ED:  Full bolus NOT administered due to CHF and acute Pulmonary Edema and 500 ml of IV fluids given  - recent heart transplant and low EF due to rejection issues    BMI Readings from Last 1 Encounters:   10/16/20 29.62 kg/m      30 mL/kg fluids based on weight: 2,380 mL  30 mL/kg fluids based on IBW (must be >= 60 inches tall): 1,850 mL                Severe Sepsis reassessment:  1. Repeat Lactic Acid Level:  pending  2. MAP>65 after initial IVF bolus, will continue to monitor fluid status and vital signs            Results for orders placed or performed during the hospital encounter of 09/22/20 (from the past 24 hour(s))   CBC with platelets differential   Result Value Ref Range    WBC 3.0 (L) 4.0 - 11.0 10e9/L    RBC Count 2.34 (L) 4.4 - 5.9 10e12/L    Hemoglobin 7.2 (L) 13.3 - 17.7 g/dL    Hematocrit 23.5 (L) 40.0 - 53.0 %     78 - 100 fl    MCH 30.8 26.5 - 33.0 pg    MCHC 30.6 (L) 31.5 - 36.5 g/dL    RDW 20.0 (H) 10.0 - 15.0 %    Platelet Count 187 150 - 450 10e9/L    Diff Method PENDING      *Note: Due to a large number of results and/or encounters for the requested time period, some results have not been displayed. A complete set of results can be found in Results Review.     Medications   acetaminophen (TYLENOL) tablet 975 mg (975 mg Oral Given 9/26/20 1104)   0.9% sodium chloride BOLUS (0 mLs Intravenous Stopped 9/22/20 1510)   piperacillin-tazobactam (ZOSYN) 3.375 g vial to attach to  mL bag (0 g Intravenous Stopped 9/22/20 1812)   iopamidol (ISOVUE-370) solution 104 mL (104 mLs Intravenous Given 9/22/20 1410)   sodium chloride (PF) 0.9% PF flush 76 mL (76 mLs Intravenous Given 9/22/20 1410)   piperacillin-tazobactam (ZOSYN) 4.5 g vial to attach to  mL bag (4.5 g Intravenous New Bag 9/25/20 2879)   calcium gluconate in D5W 100 mL intermittent infusion 2 g (2 g Intravenous Given 9/25/20 0902)   lactated ringers BOLUS  500 mL (500 mLs Intravenous New Bag 9/25/20 1201)   insulin isophane human (HumuLIN N PEN) injection 5 Units (5 Units Subcutaneous Given 10/3/20 1141)   filgrastim (NEUPOGEN) injection 120 mcg (120 mcg Subcutaneous Given 10/3/20 1717)              Assessments & Plan (with Medical Decision Making)   Immunosuppressed patient with recent heart transplant with evidence concerning for post operative infection cellulitis vs. Deeper abscess at the surgical incision sites. Also concern for bacteremia related to infection. HR in 90s with temp of 99.9 and otherwise normal vital signs. Does appear to have signs of volume overload with leg swelling and was noted to have graft rejection with low EF. Thus will not give full 30 ml/kg fluids for sepsis and discussed this directly with the nurse to cancel her 30 ml/kg order. 500 ml ordered. Sepsis order set initiated and advanced heart failure attending contacted. Recommended vancomycin and Zosyn IV and CT of the chest, abdomen, and pelvis with IV contrast for further evaluation of sign of deeper abscess. 2 peripheral Blood cultures ordered and obtained before IV antibiotics. EKG unchanged from prior. No respiratory distress. Lactic acid was found to be 2.5. WBC 3. Hemoglobin 7.2 and near baseline. Thus sings of sepsis but hemodynamically within normal limits. Admitted to the advanced heart failure team who will follow up on the CT scan. Patient was in agreement with this plan. Asymptomatic COVID 19 test pending. Patient signed out to my colleague pending admission and CT scan results in stable condition.     I have reviewed the nursing notes.    I have reviewed the findings, diagnosis, plan and need for follow up with the patient.    New Prescriptions    No medications on file       Final diagnoses:   Postoperative infection, unspecified type, initial encounter   Sepsis without acute organ dysfunction, due to unspecified organism (H)     Maribell Cowart MD  9/22/2020   [facility],  [facility] EMERGENCY DEPARTMENT    I, Nhung Farfan, am serving as a trained medical scribe to document services personally performed by Maribell Cowart MD based on the provider's statements to me on 9/22/20.  This document has been checked and approved by the attending provider.    I, Maribell Cowart MD, was physically present and have reviewed and verified the accuracy of this note documented by Nhung Farfan, medical scribe.        Maribell Cowart MD  10/19/20 0026

## 2020-09-22 NOTE — H&P
[facility], [place]    Cardiology History and Physical - Cards 2         Date of Admission:  9/22/2020    Assessment & Plan: SL    Lucian Henderson Sr. is a 64 year old male admitted on 9/22/2020. He was   admitted via the ED for evaluation of purulent discharge from incisional sites on his left chest wall and abdominal wall. Exam at admission was notable for ~4cm purulent incisional ulcer w/ perilesional erythema and tenderness; 2-3 cm ovoid epigastric ulcer, stained violet. CT chest/abdomen/pelvis (09/22/2020) findings are consistent w/ cellulitis of the left supraclavicular and epigastric regions.         #Abscess, left supraclavicular   #soft tissue infection, epigastric region  - Blood culture pending  - daily CBC w/ diff  - IV Zosyn 4.5 mg Q6H  - IV Vancomycin 1500 mg daily  - consult to Transplant ID  - consult to CV Surgery    #ICM s/p OHT  Most recent biopsy (09/14/2020) was negative.  -Immunsuppression:   - continue Prednisone 10 mg am, 5 mg pm   - continue Tacrolimus 4 mg am, 3 mg pm    - decrease Mycophenolate 750 mg BID for now   - tacrolimus trough level am, 12 hrs after last dose  Prohylaxis   Valgancyclovir 450mg every 48 hours   Bactrim 400-80 mg 1 daily    Clotrimazole 10 mg lozenge  -Hydralazine 75mg 8hrly      #Hypomagnesemia   - Keep Mg > 2.0    >Replacement protocol   - continue magnesium oxide 400 mg bid    #Bilateral pitting pedal edema  #Hypoalbuminemia   -consider nutrition consult    #T2DM  - continue pta Humulin 35 units daily  - MDSSI        #h/o constipation  Miralax 17 g daily  Senna-docusate 8.6-50 mg bid    #hx of urinary retention  Continue PTA Tamsulosin 0.4 mg daily          Diet: Carb-restricted diet; NPO at midnight  DVT Prophylaxis: Hold Fondaparinux 7.5mg daily  GI prophylaxis: Pantoprazole 40 mg daily  Osteoporosis: Calcium carbonate -Vitamin D 600-400mg daily  CAV: Rosuvastatin 10 mg daily  Calderon Catheter: not present  Code  Status: Full code  Fluids: None  Lines: PIV         Disposition Plan   Expected discharge: 2 - 3 days, recommended to prior living arrangement once improvement in clinical status..    Entered: Erickson Kelly MD 09/22/2020, 2:58 PM     The patient's care was discussed with the Attending Physician, Dr. Tom De Leon.    Erickson Kelly MD  [facility], [place]  Pager: [phone]  Please see sticky note for cross cover information  ______________________________________________________________________    Chief Complaint   Wound infection    History is obtained from the patient    History of Present Illness   Lucian Henderson Sr. is a 64 year old male w/ a pmhx of CAD, ICM s/p OHT (07/19/2020), and T2DM on insulin. He was admitted via the ED following referral from clinic d/t purulent discharge from chest wall incision site.     He reports that he developed left sided chest pain , epigastric pain, and purulent discharge 5 days ago. He states that he had been using an ointment on the skin lesions prior to his clinic visit. He was seen at [facility] today for a magnesium infusion, while at the clinic, it was observed that he had purulent discharge and erythema at his old subclavian site. He had an infusion of magnesium and was subsequently sent to the ED for evaluation. He denies fever, SOB, or cough. He denies palpitations, lightheadedness, nausea or vomiting.     At the ED, he was noted to have generalized weakness. Labs in the ED were notable for glucosuria (150 mg/dl), albuminuria (10 mg/dl), elevated Scr (1.5 mg/dl), elevated NTBNP (8,792 pg/l), elevated CRP (16.0 mg/dl), elevated lactate (2.5 mmol/L), leucopenia (3.0), normocytic normochromic anemia (7.2 mg/dl). CT chest/abdomen/pelvis reviewed consistent with left supraclavicular and epigastric cellulitis. Other labs prior to presentation were notable for Tacrolimus level (6.5 micrograms/L, 09/21) and hypomagnesemia--Mag 1.1 mg/dl  (09/21).      Review of Systems    The 10 point Review of Systems is negative other than noted in the HPI.    Past Medical History    I have reviewed this patient's medical history and updated it with pertinent information if needed.   Past Medical History:   Diagnosis Date     CAD (coronary artery disease)      CHF (congestive heart failure) (H)      CKD (chronic kidney disease), stage III (H)      Cortical cataract of both eyes      Diabetes (H)      Hyperlipidemia      Hypertension      Ischemic cardiomyopathy      Obesity      CHANTEL (obstructive sleep apnea)     occas cpap     Osteoarthritis        Past Surgical History   I have reviewed this patient's surgical history and updated it with pertinent information if needed.  Past Surgical History:   Procedure Laterality Date     C CABG, ARTERY-VEIN, THREE  02/2008     CATARACT IOL, RT/LT       COLONOSCOPY N/A 8/7/2019    Procedure: COLONOSCOPY, WITH POLYPECTOMY AND BIOPSY;  Surgeon: Chauncey Morataya MD;  Location:  GI     CV ANGIOGRAM CORONARY GRAFT N/A 7/2/2020    Procedure: Angiogram Coronary Graft;  Surgeon: Alex Lantigua MD;  Location:  HEART CARDIAC CATH LAB     CV CORONARY ANGIOGRAM N/A 7/2/2020    Procedure: CV CORONARY ANGIOGRAM;  Surgeon: Alex Lantigua MD;  Location:  HEART CARDIAC CATH LAB     CV HEART BIOPSY N/A 7/27/2020    Procedure: Heart Biopsy;  Surgeon: Mario Burr MD;  Location:  HEART CARDIAC CATH LAB     CV HEART BIOPSY N/A 8/3/2020    Procedure: CV HEART BIOPSY;  Surgeon: Alex Lantigua MD;  Location:  HEART CARDIAC CATH LAB     CV HEART BIOPSY N/A 8/10/2020    Procedure: CV HEART BIOPSY;  Surgeon: Mario Burr MD;  Location:  HEART CARDIAC CATH LAB     CV HEART BIOPSY N/A 8/17/2020    Procedure: CV HEART BIOPSY;  Surgeon: Raimundo Hudson MD;  Location:  HEART CARDIAC CATH LAB     CV HEART BIOPSY N/A 8/31/2020    Procedure: CV HEART BIOPSY;  Surgeon: Moises Santos MD;   Location: [facility] HEART [facility]ARD[facility]AC [facility] LAB     CV HEART BIOPSY N/A 9/14/2020    Procedure: CV HEART BIOPSY;  Surgeon: Raimundo Hudson MD;  Location:  HEART CARDIAC CATH LAB     CV INTRA-AORTIC BALLOON PUMP INSERTION N/A 7/14/2020    Procedure: RHC WITH LEAVE IN SWAN/IABP;  Surgeon: Sonny Saleh MD;  Location: [facility] HEART CARDIAC CATH LAB     CV RIGHT HEART CATH N/A 3/25/2019    Procedure: CV RIGHT HEART CATH;  Surgeon: Moises Santos MD;  Location: [facility] HEART CARDIAC CATH LAB     CV RIGHT HEART CATH N/A 7/10/2019    Procedure: CV RIGHT HEART CATH;  Surgeon: Jak Mccabe MD;  Location:  HEART CARDIAC CATH LAB     CV RIGHT HEART CATH N/A 7/8/2020    Procedure: Right Heart Cath with Leave In swan already has ICU load;  Surgeon: Jak Mccabe MD;  Location:  HEART CARDIAC CATH LAB     CV RIGHT HEART CATH N/A 7/14/2020    Procedure: CV RIGHT HEART CATH;  Surgeon: Sonny Saleh MD;  Location: [facility] HEART CARDIAC CATH LAB     CV RIGHT HEART CATH N/A 7/2/2020    Procedure: CV RIGHT HEART CATH;  Surgeon: Alex Lantigua MD;  Location:  HEART CARDIAC CATH LAB     CV RIGHT HEART CATH N/A 7/27/2020    Procedure: Right Heart Cath;  Surgeon: Mario Burr MD;  Location:  HEART CARDIAC CATH LAB     CV RIGHT HEART CATH N/A 8/3/2020    Procedure: Right Heart Cath;  Surgeon: Alex Lantigua MD;  Location: [facility] HEART CARDIAC CATH LAB     CV RIGHT HEART CATH N/A 8/10/2020    Procedure: CV RIGHT HEART CATH;  Surgeon: Mario Burr MD;  Location:  HEART CARDIAC CATH LAB     CV RIGHT HEART CATH N/A 8/17/2020    Procedure: CV RIGHT HEART CATH;  Surgeon: Raimundo Hudson MD;  Location:  HEART CARDIAC CATH LAB     CV RIGHT HEART CATH N/A 8/31/2020    Procedure: CV RIGHT HEART CATH;  Surgeon: Moises Santos MD;  Location: [facility] HEART CARDIAC CATH LAB     CV RIGHT HEART CATH N/A 9/14/2020    Procedure: CV RIGHT HEART CATH;  Surgeon: Raimundo Hudson MD;   Location:  HEART CARDIAC CATH LAB     EXTRACTION(S) DENTAL Left 7/13/2020    Procedure: EXTRACTION, TOOTH #11, 12, 13, 15, and 29;  Surgeon: Monica Chao DDS;  Location:  OR     IMPLANT AUTOMATIC IMPLANTABLE CARDIOVERTER DEFIBRILLATOR       INSERT INTRAAORTIC BALLOON PUMP N/A 7/16/2020    Procedure: Subclavian Intra-Aortic Balloon Pump Placement;  Surgeon: Allan Sparrow MD;  Location: [facility] OR     PHACOEMULSIFICATION CLEAR CORNEA WITH STANDARD IOL, VITRECTOMY PARSPLANA 25 GAGUE, COMBINED Left 12/10/2019    Procedure: PHACOEMULSIFICATION, CATARACT, CLEAR CORNEAL INCISION APPROACH, W STD INTRAOCULAR LENS IMPLANT INSERT + VITRECTOMY BY PARS PLANA  USING 25-GAUGE INSTRUMENTS. ENDOLASER, INFUSION OF 20% SF6 GAS;  Surgeon: Triny Bunch MD;  Location: [facility] OR     PICC DOUBLE LUMEN PLACEMENT Left 07/28/2020    5Fr - 42cm, Basilic vein, mid SVC     PICC INSERTION Right 07/11/2020    basilic 44 cm total      TRANSPLANT HEART RECIPIENT N/A 7/19/2020    Procedure: REDO MEDIAN STERNOTOMY, TRANSPLANT, ORTHOTOPIC HEART, RECIPIENT, ON PUMP OXYGENATOR, REMOVAL OF CARDIAC DEFIBRILLATOR AND LEAD;  Surgeon: Griselli, Massimo, MD;  Location:  OR       Social History   I have reviewed this patient's social history and updated it with pertinent information if needed.  Social History     Tobacco Use     Smoking status: Never Smoker     Smokeless tobacco: Never Used     Tobacco comment: Never smoked; non-smoking household   Substance Use Topics     Alcohol use: No     Alcohol/week: 0.0 standard drinks     Drug use: No     Family History   I have reviewed this patient's family history and updated it with pertinent information if needed.   I have reviewed this patient's family history and updated it with pertinent information if needed.  Family History   Problem Relation Age of Onset     Diabetes Brother      Diabetes Sister      Diabetes Sister      Macular Degeneration No family hx of      Glaucoma No family hx of       Myocardial Infarction No family hx of      Kidney Disease No family hx of        Prior to Admission Medications   Prior to Admission Medications   Prescriptions Last Dose Informant Patient Reported? Taking?   Alcohol Swabs PADS   No No   Sig: Use to swab the area of the injection or sergio as directed   Per insurance coverage   Continuous Blood Gluc Sensor (FREESTYLE JEREMY 14 DAY SENSOR) [facility]   No No   Sig: Change every 14 days.   Continuous Blood Gluc Sensor (FREESTYLE JEREMY 14 DAY SENSOR) [facility]   No No   Sig: Change every 14 days.   acetaminophen (TYLENOL) 325 MG tablet   No No   Sig: Take 3 tablets (975 mg) by mouth every 8 hours as needed for mild pain   blood glucose (ACCU-CHEK SMARTVIEW) test strip  Self No No   Sig: USE TO TEST THREE TIMES DAILY AS DIRECTED   blood glucose (NO BRAND SPECIFIED) test strip  Self No No   Sig: Use to test blood sugar 2 times daily or as directed.   blood glucose monitoring (ACCU-CHEK FELIPE SMARTVIEW) meter device kit  Self No No   Sig: Use to test blood sugar 3-4 times daily, as directed.   blood glucose monitoring (ONE TOUCH DELICA) lancets  Self No No   Sig: Use to test blood sugars 2 times daily.   calcium carbonate 600 mg-vitamin D 400 units (CALTRATE) 600-400 MG-UNIT per tablet   No No   Sig: Take 1 tablet by mouth 2 times daily (with meals)   clotrimazole (MYCELEX) 10 MG lozenge   No No   Sig: Place 1 lozenge inside cheek 4 times daily   fondaparinux ANTICOAGULANT (ARIXTRA) 7.5MG/0.6ML injection   No No   Sig: Inject 0.6 mLs (7.5 mg) Subcutaneous every 24 hours Hold dose on the mornings of your right heart catheterization and biopsies   hydrALAZINE (APRESOLINE) 25 MG tablet   No No   Sig: Take 3 tablets (75 mg) by mouth every 8 hours   insulin aspart (NOVOLOG PEN) 100 UNIT/ML pen   No No   Sig: Inject 15 - 31 units with each meal.   insulin isophane human (HUMULIN N PEN) 100 UNIT/ML injection   No No   Sig: Inject 35 Units Subcutaneous every morning   insulin pen needle (32G X  4 MM) 32G X 4 MM miscellaneous   No No   Sig: Use as directed by provider  Per insurance coverage   insulin pen needle (B-D U/F) 31G X 5 MM miscellaneous   No No   Si Units by Device route 2 times daily Use 2 pen needles daily or as directed.   insulin pen needle (NOVOFINE) 30G X 8 MM miscellaneous   No No   Sig: Inject 1 Box Subcutaneous 6 times daily Use 6 pen needles daily or as directed.   magnesium oxide 400 MG CAPS   No No   Sig: Take 400 mg by mouth 2 times daily   mycophenolate (GENERIC EQUIVALENT) 250 MG capsule   No No   Sig: TAKE 6 CAPSULES BY MOUTH TWICE DAILY   order for DME  Self No No   Sig: Auto CPAP 8-15 cmH20   pantoprazole (PROTONIX) 40 MG EC tablet   No No   Sig: Take 1 tablet (40 mg) by mouth every morning (before breakfast)   polyethylene glycol (MIRALAX) 17 g packet   No No   Si g by Oral or Feeding Tube route daily as needed for constipation   predniSONE (DELTASONE) 10 MG tablet   No No   Sig: Take 1 tablet (10 mg) by mouth every morning AND 0.5 tablets (5 mg) every evening.   rosuvastatin (CRESTOR) 10 MG tablet   No No   Sig: Take 1 tablet (10 mg) by mouth daily   senna-docusate (SENOKOT-S/PERICOLACE) 8.6-50 MG tablet   No No   Sig: Take 1 tablet by mouth 2 times daily as needed for constipation   sulfamethoxazole-trimethoprim (BACTRIM) 400-80 MG tablet   No No   Sig: Take 1 tablet by mouth daily   tacrolimus (GENERIC EQUIVALENT) 1 MG capsule   No No   Sig: Take 4 capsules (4 mg) by mouth every morning AND 3 capsules (3 mg) every evening. Or as directed by transplant clinic.   tamsulosin (FLOMAX) 0.4 MG capsule   No No   Sig: Take 1 capsule (0.4 mg) by mouth daily   valGANciclovir (VALCYTE) 450 MG tablet   No No   Sig: Take 1 tablet (450 mg) by mouth every 48 hours      Facility-Administered Medications: None     Allergies   Allergies   Allergen Reactions     Heparin Heparin Induced Thrombocytopenia     HIT screen sent 2020. CORRINE confirmed positive       Physical Exam   Vital  Signs: Temp: 99.5  F (37.5  C) Temp src: Oral BP: 126/71 Pulse: 99   Resp: 16 SpO2: 98 % O2 Device: None (Room air)    Weight: 0 lbs 0 oz    Physical Exam  Constitutional:       Appearance: He is ill-appearing. He is not diaphoretic.   HENT:      Head: Normocephalic and atraumatic.      Nose: No rhinorrhea.   Eyes:      Comments: Right pupil 2-3 mm, responsive to light  Left pupil, 3-4 mm, unresponsive to light   Cardiovascular:      Rate and Rhythm: Normal rate and regular rhythm.      Pulses: Normal pulses.      Heart sounds: No murmur. No friction rub. No gallop.    Pulmonary:      Effort: No respiratory distress.      Breath sounds: No stridor. Wheezing present. No rhonchi.   Chest:      Chest wall: Tenderness present.   Abdominal:      General: Bowel sounds are normal.      Tenderness: There is no abdominal tenderness. There is no guarding.   Musculoskeletal:      Right lower leg: Edema present.      Left lower leg: Edema present.      Comments: Bilateral pitting pedal edema up to the knees.   Lymphadenopathy:      Cervical: No cervical adenopathy.   Skin:     General: Skin is warm.      Coloration: Skin is not jaundiced.      Findings: Bruising present.      Comments: ~4cm purulent incisional ulcer w/ perilesional erythema and tenderness; 2-3 cm ovoid epigastric ulcer, stained violet.   Bruising over right calf and posterio-medial right thigh.   Neurological:      General: No focal deficit present.      Mental Status: He is oriented to person, place, and time.   Psychiatric:         Mood and Affect: Mood normal.           Data   Data reviewed today: I reviewed all medications, new labs and imaging results over the last 24 hours. I personally reviewed.    Recent Results (from the past 24 hour(s))   CT Chest/Abdomen/Pelvis w Contrast    Narrative    EXAMINATION: CT CHEST/ABDOMEN/PELVIS W CONTRAST, 9/22/2020 2:36 PM    TECHNIQUE:  Helical CT images from the thoracic inlet through the  symphysis pubis were  obtained  with contrast.     Contrast dose: iopamidol (ISOVUE-370) solution 104 mL    COMPARISON: 7/9/2019 CT    HISTORY: recent heart transplant(7/19/2020), purulent drainage and  swelling over left subclavian wound and abdominal wound, concern for  abscess, deeper infection, etc    FINDINGS:    Chest: Postsurgical changes of a median sternotomy orthotopic heart  transplant and removal of left chest wall ICD.     Overlying the left supraclavicular fossa and left pectoralis major  musculature in what was likely the prior left chest wall implantable  cardiac defibrillator pocket, there is  soft tissue edema/thickening  that appears to track superiorly though is incompletely visualized. No  associated drainable fluid collection. Additionally, within the  retrosternal space/anterior mediastinum tracking inferiorly along the  pericardium and into the pericardial space anterior to the heart,  there is a simple appearing, low attenuating, thin rim enhancing fluid  collection that does not appear to involve the median sternotomy or  invade pericardial fat planes. No surrounding inflammatory fat  stranding and/or associated gas. Skin thickening and focal  subcutaneous soft tissue thickening/fluid in the midline epigastric  region corresponding to the site of a previous pericardial drain.    The tracheobronchial tree is patent. There are multiple subcentimeter  calcified granulomas through the bilateral lung fields. Small  left-sided pleural effusion. Trace right pleural effusion. No  pneumothorax. Bilateral gynecomastia. Within the right supraclavicular  fossa there is an elongated contrast-enhancing structure extending  superiorly from and continuous with the lumen of the lateral right  subclavian artery with surrounding soft tissue thickening and an  adjacent retained linear vascular access cannula, which is located  along the superficial aspect of the right pectoralis minor  musculature. There is no mediastinal or  axillary adenopathy.    Abdomen and pelvis: There are no focal hepatic masses. Mild degree of  periportal edema. Portal vasculature is patent. Cholelithiasis without  cholecystitis. No intra or extra hepatic biliary dilatation. The  pancreas, spleen and adrenal glands appear normal. There is bilateral,  mild renal parenchymal atrophy. No nephrolithiasis. Mild bilateral  hydroureter. No evidence of ureteral obstruction. The small and large  bowel are of normal caliber. The appendix is normal-appearing. There  is no pelvic, retroperitoneal or inguinal adenopathy. No free  abdominal or pelvic fluid.    Bones and soft tissues: Degenerative changes most notable in the mid  thoracic spine. No suspicious osseous or soft tissue lesions.      Impression    IMPRESSION:     In this patient who is approximately 2 months postop orthotopic heart  transplant:     1. Left supraclavicular subcutaneous soft tissue thickening and fat  stranding is likely an infectious/inflammatory process originating  from the patient's prior left implantable cardiac defibrillator chest  wall pocket. No appreciable associated drainable fluid collection,  though this process is incompletely visualized, extending above the  field-of-view.    2. Simple appearing anterior mediastinal/pericardial fluid collection  with thin rim enhancement but no significant associated inflammatory  fat stranding, likely a postoperative serous collection. There is no  evidence of sternal wound involvement or communication with the  presumed left supraclavicular fossa cellulitis.     3. Small region of isolated skin/subcutaneous soft tissue thickening  in the epigastric region, likely corresponding to the site of a prior  pericardial drain and suggestive of cellulitis. No drainable fluid  collection at this location.     4. Retained vascular access cannula in the right supraclavicular fossa  with an adjacent elongated subclavian artery pseudoaneurysm, contained  by  surrounded by soft tissue thickening.    4. Small left and trace right pleural effusions.    5. Cholelithiasis without cholecystitis.    I have personally reviewed the examination and initial interpretation  and I agree with the findings.    HAM WALLS, DO

## 2020-09-22 NOTE — LETTER
"    2020         RE: Lucian Henderson Sr.  [address] [address]        Dear Colleague,    Thank you for referring your patient, Lucian Henderson Sr., to the [facility] SPECIALTY AND PROCEDURE. Please see a copy of my visit note below.    Infusion nursing note:    Lucian Henderson Sr. presents today to [facility] for a magnesium infusion.  During today's [facility] appointment orders from [facility] were completed.  Frequency: one time    Progress note:  ID verified by name and .  Assessment completed.  Vitals were stable throughout time in [facility].  verbal education given to patient/representative regarding infusion and possible side effects.  Patient verbalized understanding.    Note: Pt c/o pain in abdomen and chest. Left upper chest had old tunneled line site with an opening approx 1\" round with a puss like substance in the wound and draining small amount of clear fluid.  At surgical site Mid upper abdomen old surgical site red, swollen and warm.    Dr. Meng notified of low grade temp as well as wounds.  TORB: send to Emergency department after magnesium infusion.  Okay for daughter to transport pt to ED.    Infusion given over approximately  1 hour     Administrations This Visit     magnesium sulfate 2 g in water intermittent infusion     Admin Date  2020 Action  New Bag Dose  2 g Rate  50 mL/hr Route  Intravenous Administered By  Mayra Arcos RN                BP (!) (P) 142/72   Pulse (P) 99   Temp (P) 99.1  F (37.3  C) (Oral)   Resp (P) 16     Discharge plan:    Discharge instructions were reviewed with patient: Yes  Patient/representative verbalized understanding of discharge instructions and all questions answered: Yes.    Discharged from [facility] with wife.  Daughter picking pt up to go to the [place] ED.  Report called to ED.    Estephanie Webb RN          Again, thank you for allowing me to participate in the care of your patient.  "       Sincerely,        VIDEO,

## 2020-09-23 ENCOUNTER — ANESTHESIA EVENT (OUTPATIENT)
Dept: SURGERY | Facility: CLINIC | Age: 65
End: 2020-09-23

## 2020-09-23 ENCOUNTER — APPOINTMENT (OUTPATIENT)
Dept: CARDIOLOGY | Facility: CLINIC | Age: 65
DRG: 857 | End: 2020-09-23
Attending: INTERNAL MEDICINE
Payer: COMMERCIAL

## 2020-09-23 ENCOUNTER — ANESTHESIA (OUTPATIENT)
Dept: SURGERY | Facility: CLINIC | Age: 65
End: 2020-09-23

## 2020-09-23 LAB
ABO + RH BLD: NORMAL
ABO + RH BLD: NORMAL
ANION GAP SERPL CALCULATED.3IONS-SCNC: 7 MMOL/L (ref 3–14)
ANISOCYTOSIS BLD QL SMEAR: SLIGHT
BACTERIA SPEC CULT: NORMAL
BASOPHILS # BLD AUTO: 0 10E9/L (ref 0–0.2)
BASOPHILS NFR BLD AUTO: 0 %
BLD GP AB SCN SERPL QL: NORMAL
BLD PROD TYP BPU: NORMAL
BLD PROD TYP BPU: NORMAL
BLD UNIT ID BPU: 0
BLOOD BANK CMNT PATIENT-IMP: NORMAL
BLOOD PRODUCT CODE: NORMAL
BPU ID: NORMAL
BUN SERPL-MCNC: 36 MG/DL (ref 7–30)
CALCIUM SERPL-MCNC: 8.2 MG/DL (ref 8.5–10.1)
CHLORIDE SERPL-SCNC: 108 MMOL/L (ref 94–109)
CO2 SERPL-SCNC: 21 MMOL/L (ref 20–32)
CREAT SERPL-MCNC: 1.26 MG/DL (ref 0.66–1.25)
CRP SERPL-MCNC: 42 MG/L (ref 0–8)
DACRYOCYTES BLD QL SMEAR: SLIGHT
DIFFERENTIAL METHOD BLD: ABNORMAL
ELLIPTOCYTES BLD QL SMEAR: ABNORMAL
EOSINOPHIL # BLD AUTO: 0 10E9/L (ref 0–0.7)
EOSINOPHIL NFR BLD AUTO: 0 %
ERYTHROCYTE [DISTWIDTH] IN BLOOD BY AUTOMATED COUNT: 19.1 % (ref 10–15)
ERYTHROCYTE [DISTWIDTH] IN BLOOD BY AUTOMATED COUNT: 20.2 % (ref 10–15)
FERRITIN SERPL-MCNC: 447 NG/ML (ref 26–388)
FOLATE SERPL-MCNC: 8.5 NG/ML
GFR SERPL CREATININE-BSD FRML MDRD: 59 ML/MIN/{1.73_M2}
GLUCOSE BLDC GLUCOMTR-MCNC: 101 MG/DL (ref 70–99)
GLUCOSE BLDC GLUCOMTR-MCNC: 154 MG/DL (ref 70–99)
GLUCOSE BLDC GLUCOMTR-MCNC: 250 MG/DL (ref 70–99)
GLUCOSE BLDC GLUCOMTR-MCNC: 292 MG/DL (ref 70–99)
GLUCOSE BLDC GLUCOMTR-MCNC: 74 MG/DL (ref 70–99)
GLUCOSE BLDC GLUCOMTR-MCNC: 89 MG/DL (ref 70–99)
GLUCOSE BLDC GLUCOMTR-MCNC: 93 MG/DL (ref 70–99)
GLUCOSE SERPL-MCNC: 203 MG/DL (ref 70–99)
GRAM STN SPEC: ABNORMAL
HCT VFR BLD AUTO: 22.2 % (ref 40–53)
HCT VFR BLD AUTO: 25.5 % (ref 40–53)
HGB BLD-MCNC: 6.8 G/DL (ref 13.3–17.7)
HGB BLD-MCNC: 7.9 G/DL (ref 13.3–17.7)
INR PPP: 1.16 (ref 0.86–1.14)
IRON SATN MFR SERPL: 24 % (ref 15–46)
IRON SERPL-MCNC: 66 UG/DL (ref 35–180)
LACTATE BLD-SCNC: 1.3 MMOL/L (ref 0.7–2)
LYMPHOCYTES # BLD AUTO: 0 10E9/L (ref 0.8–5.3)
LYMPHOCYTES NFR BLD AUTO: 1.7 %
Lab: ABNORMAL
Lab: NORMAL
MAGNESIUM SERPL-MCNC: 1.8 MG/DL (ref 1.6–2.3)
MCH RBC QN AUTO: 30.2 PG (ref 26.5–33)
MCH RBC QN AUTO: 30.6 PG (ref 26.5–33)
MCHC RBC AUTO-ENTMCNC: 30.6 G/DL (ref 31.5–36.5)
MCHC RBC AUTO-ENTMCNC: 31 G/DL (ref 31.5–36.5)
MCV RBC AUTO: 100 FL (ref 78–100)
MCV RBC AUTO: 97 FL (ref 78–100)
MONOCYTES # BLD AUTO: 0.1 10E9/L (ref 0–1.3)
MONOCYTES NFR BLD AUTO: 4.3 %
NEUTROPHILS # BLD AUTO: 2.4 10E9/L (ref 1.6–8.3)
NEUTROPHILS NFR BLD AUTO: 94 %
NUM BPU REQUESTED: 1
OVALOCYTES BLD QL SMEAR: ABNORMAL
PLATELET # BLD AUTO: 169 10E9/L (ref 150–450)
PLATELET # BLD AUTO: 179 10E9/L (ref 150–450)
PLATELET # BLD EST: ABNORMAL 10*3/UL
POIKILOCYTOSIS BLD QL SMEAR: ABNORMAL
POTASSIUM SERPL-SCNC: 4.8 MMOL/L (ref 3.4–5.3)
RBC # BLD AUTO: 2.22 10E12/L (ref 4.4–5.9)
RBC # BLD AUTO: 2.62 10E12/L (ref 4.4–5.9)
RETICS # AUTO: 41.9 10E9/L (ref 25–95)
RETICS # AUTO: 48 10E9/L (ref 25–95)
RETICS/RBC NFR AUTO: 2.1 % (ref 0.5–2)
RETICS/RBC NFR AUTO: NORMAL % (ref 0.5–2)
SODIUM SERPL-SCNC: 136 MMOL/L (ref 133–144)
SPECIMEN EXP DATE BLD: NORMAL
SPECIMEN SOURCE: ABNORMAL
SPECIMEN SOURCE: NORMAL
TIBC SERPL-MCNC: 272 UG/DL (ref 240–430)
TRANSFERRIN SERPL-MCNC: 162 MG/DL (ref 210–360)
TRANSFERRIN SERPL-MCNC: 166 MG/DL (ref 210–360)
TRANSFUSION STATUS PATIENT QL: NORMAL
TRANSFUSION STATUS PATIENT QL: NORMAL
VIT B12 SERPL-MCNC: 584 PG/ML (ref 193–986)
WBC # BLD AUTO: 2.4 10E9/L (ref 4–11)
WBC # BLD AUTO: 2.6 10E9/L (ref 4–11)

## 2020-09-23 PROCEDURE — 25800030 ZZH RX IP 258 OP 636: Performed by: INTERNAL MEDICINE

## 2020-09-23 PROCEDURE — 37000008 ZZH ANESTHESIA TECHNICAL FEE, 1ST 30 MIN: Performed by: SURGERY

## 2020-09-23 PROCEDURE — 93306 TTE W/DOPPLER COMPLETE: CPT | Mod: 26 | Performed by: INTERNAL MEDICINE

## 2020-09-23 PROCEDURE — 87070 CULTURE OTHR SPECIMN AEROBIC: CPT | Performed by: SURGERY

## 2020-09-23 PROCEDURE — 86140 C-REACTIVE PROTEIN: CPT | Performed by: STUDENT IN AN ORGANIZED HEALTH CARE EDUCATION/TRAINING PROGRAM

## 2020-09-23 PROCEDURE — 87176 TISSUE HOMOGENIZATION CULTR: CPT | Performed by: SURGERY

## 2020-09-23 PROCEDURE — 80048 BASIC METABOLIC PNL TOTAL CA: CPT | Performed by: STUDENT IN AN ORGANIZED HEALTH CARE EDUCATION/TRAINING PROGRAM

## 2020-09-23 PROCEDURE — 25000125 ZZHC RX 250: Performed by: NURSE ANESTHETIST, CERTIFIED REGISTERED

## 2020-09-23 PROCEDURE — 21400000 ZZH R&B CCU UMMC

## 2020-09-23 PROCEDURE — 36000062 ZZH SURGERY LEVEL 4 1ST 30 MIN - UMMC: Performed by: SURGERY

## 2020-09-23 PROCEDURE — 25000128 H RX IP 250 OP 636: Performed by: NURSE ANESTHETIST, CERTIFIED REGISTERED

## 2020-09-23 PROCEDURE — 25000131 ZZH RX MED GY IP 250 OP 636 PS 637: Performed by: STUDENT IN AN ORGANIZED HEALTH CARE EDUCATION/TRAINING PROGRAM

## 2020-09-23 PROCEDURE — 87077 CULTURE AEROBIC IDENTIFY: CPT | Performed by: SURGERY

## 2020-09-23 PROCEDURE — 84466 ASSAY OF TRANSFERRIN: CPT | Performed by: STUDENT IN AN ORGANIZED HEALTH CARE EDUCATION/TRAINING PROGRAM

## 2020-09-23 PROCEDURE — 36415 COLL VENOUS BLD VENIPUNCTURE: CPT | Performed by: STUDENT IN AN ORGANIZED HEALTH CARE EDUCATION/TRAINING PROGRAM

## 2020-09-23 PROCEDURE — 37000009 ZZH ANESTHESIA TECHNICAL FEE, EACH ADDTL 15 MIN: Performed by: SURGERY

## 2020-09-23 PROCEDURE — P9016 RBC LEUKOCYTES REDUCED: HCPCS | Performed by: STUDENT IN AN ORGANIZED HEALTH CARE EDUCATION/TRAINING PROGRAM

## 2020-09-23 PROCEDURE — 93306 TTE W/DOPPLER COMPLETE: CPT

## 2020-09-23 PROCEDURE — 25000128 H RX IP 250 OP 636: Performed by: INTERNAL MEDICINE

## 2020-09-23 PROCEDURE — 00000146 ZZHCL STATISTIC GLUCOSE BY METER IP

## 2020-09-23 PROCEDURE — 87205 SMEAR GRAM STAIN: CPT | Performed by: SURGERY

## 2020-09-23 PROCEDURE — 85610 PROTHROMBIN TIME: CPT | Performed by: STUDENT IN AN ORGANIZED HEALTH CARE EDUCATION/TRAINING PROGRAM

## 2020-09-23 PROCEDURE — 0HB5XZZ EXCISION OF CHEST SKIN, EXTERNAL APPROACH: ICD-10-PCS | Performed by: SURGERY

## 2020-09-23 PROCEDURE — 99232 SBSQ HOSP IP/OBS MODERATE 35: CPT | Mod: 25 | Performed by: INTERNAL MEDICINE

## 2020-09-23 PROCEDURE — 25000131 ZZH RX MED GY IP 250 OP 636 PS 637: Performed by: INTERNAL MEDICINE

## 2020-09-23 PROCEDURE — 25000132 ZZH RX MED GY IP 250 OP 250 PS 637: Performed by: INTERNAL MEDICINE

## 2020-09-23 PROCEDURE — 27210794 ZZH OR GENERAL SUPPLY STERILE: Performed by: SURGERY

## 2020-09-23 PROCEDURE — 40000169 ZZH STATISTIC PRE-PROCEDURE ASSESSMENT I: Performed by: SURGERY

## 2020-09-23 PROCEDURE — 87186 SC STD MICRODIL/AGAR DIL: CPT | Performed by: SURGERY

## 2020-09-23 PROCEDURE — 87077 CULTURE AEROBIC IDENTIFY: CPT | Performed by: INTERNAL MEDICINE

## 2020-09-23 PROCEDURE — 83735 ASSAY OF MAGNESIUM: CPT | Performed by: STUDENT IN AN ORGANIZED HEALTH CARE EDUCATION/TRAINING PROGRAM

## 2020-09-23 PROCEDURE — 25000132 ZZH RX MED GY IP 250 OP 250 PS 637: Performed by: STUDENT IN AN ORGANIZED HEALTH CARE EDUCATION/TRAINING PROGRAM

## 2020-09-23 PROCEDURE — 87075 CULTR BACTERIA EXCEPT BLOOD: CPT | Performed by: SURGERY

## 2020-09-23 PROCEDURE — 87070 CULTURE OTHR SPECIMN AEROBIC: CPT | Performed by: INTERNAL MEDICINE

## 2020-09-23 PROCEDURE — 25000125 ZZHC RX 250: Performed by: SURGERY

## 2020-09-23 PROCEDURE — 83605 ASSAY OF LACTIC ACID: CPT | Performed by: STUDENT IN AN ORGANIZED HEALTH CARE EDUCATION/TRAINING PROGRAM

## 2020-09-23 PROCEDURE — 87205 SMEAR GRAM STAIN: CPT | Performed by: INTERNAL MEDICINE

## 2020-09-23 PROCEDURE — 36000064 ZZH SURGERY LEVEL 4 EA 15 ADDTL MIN - UMMC: Performed by: SURGERY

## 2020-09-23 PROCEDURE — 85027 COMPLETE CBC AUTOMATED: CPT | Performed by: STUDENT IN AN ORGANIZED HEALTH CARE EDUCATION/TRAINING PROGRAM

## 2020-09-23 PROCEDURE — 87186 SC STD MICRODIL/AGAR DIL: CPT | Performed by: INTERNAL MEDICINE

## 2020-09-23 RX ORDER — VALGANCICLOVIR 450 MG/1
900 TABLET, FILM COATED ORAL DAILY
Status: DISCONTINUED | OUTPATIENT
Start: 2020-09-24 | End: 2020-09-24

## 2020-09-23 RX ORDER — ACETAMINOPHEN 325 MG/1
650 TABLET ORAL EVERY 4 HOURS PRN
Status: DISCONTINUED | OUTPATIENT
Start: 2020-09-26 | End: 2020-10-05 | Stop reason: HOSPADM

## 2020-09-23 RX ORDER — OXYCODONE HYDROCHLORIDE 5 MG/1
5-10 TABLET ORAL EVERY 4 HOURS PRN
Status: DISCONTINUED | OUTPATIENT
Start: 2020-09-23 | End: 2020-10-05 | Stop reason: HOSPADM

## 2020-09-23 RX ORDER — ACETAMINOPHEN 325 MG/1
975 TABLET ORAL EVERY 8 HOURS
Status: DISPENSED | OUTPATIENT
Start: 2020-09-23 | End: 2020-09-26

## 2020-09-23 RX ORDER — CEFAZOLIN SODIUM 2 G/100ML
2 INJECTION, SOLUTION INTRAVENOUS
Status: DISCONTINUED | OUTPATIENT
Start: 2020-09-23 | End: 2020-09-23 | Stop reason: HOSPADM

## 2020-09-23 RX ORDER — MUPIROCIN 20 MG/G
1 OINTMENT TOPICAL 2 TIMES DAILY
Status: DISCONTINUED | OUTPATIENT
Start: 2020-09-23 | End: 2020-09-23 | Stop reason: HOSPADM

## 2020-09-23 RX ORDER — LIDOCAINE HYDROCHLORIDE 20 MG/ML
INJECTION, SOLUTION INFILTRATION; PERINEURAL PRN
Status: DISCONTINUED | OUTPATIENT
Start: 2020-09-23 | End: 2020-09-23

## 2020-09-23 RX ORDER — CEFAZOLIN SODIUM 1 G/3ML
1 INJECTION, POWDER, FOR SOLUTION INTRAMUSCULAR; INTRAVENOUS SEE ADMIN INSTRUCTIONS
Status: DISCONTINUED | OUTPATIENT
Start: 2020-09-23 | End: 2020-09-23 | Stop reason: HOSPADM

## 2020-09-23 RX ORDER — ONDANSETRON 2 MG/ML
4 INJECTION INTRAMUSCULAR; INTRAVENOUS EVERY 6 HOURS PRN
Status: DISCONTINUED | OUTPATIENT
Start: 2020-09-23 | End: 2020-10-05 | Stop reason: HOSPADM

## 2020-09-23 RX ORDER — LIDOCAINE 40 MG/G
CREAM TOPICAL
Status: DISCONTINUED | OUTPATIENT
Start: 2020-09-23 | End: 2020-10-05 | Stop reason: HOSPADM

## 2020-09-23 RX ORDER — ONDANSETRON 2 MG/ML
INJECTION INTRAMUSCULAR; INTRAVENOUS PRN
Status: DISCONTINUED | OUTPATIENT
Start: 2020-09-23 | End: 2020-09-23

## 2020-09-23 RX ORDER — AMOXICILLIN 250 MG
1 CAPSULE ORAL 2 TIMES DAILY
Status: DISCONTINUED | OUTPATIENT
Start: 2020-09-23 | End: 2020-10-05 | Stop reason: HOSPADM

## 2020-09-23 RX ORDER — NALOXONE HYDROCHLORIDE 0.4 MG/ML
.1-.4 INJECTION, SOLUTION INTRAMUSCULAR; INTRAVENOUS; SUBCUTANEOUS
Status: DISCONTINUED | OUTPATIENT
Start: 2020-09-23 | End: 2020-10-05 | Stop reason: HOSPADM

## 2020-09-23 RX ORDER — PROPOFOL 10 MG/ML
INJECTION, EMULSION INTRAVENOUS CONTINUOUS PRN
Status: DISCONTINUED | OUTPATIENT
Start: 2020-09-23 | End: 2020-09-23

## 2020-09-23 RX ORDER — LIDOCAINE HYDROCHLORIDE AND EPINEPHRINE 10; 10 MG/ML; UG/ML
INJECTION, SOLUTION INFILTRATION; PERINEURAL PRN
Status: DISCONTINUED | OUTPATIENT
Start: 2020-09-23 | End: 2020-09-23 | Stop reason: HOSPADM

## 2020-09-23 RX ORDER — HYDROMORPHONE HYDROCHLORIDE 1 MG/ML
.3-.5 INJECTION, SOLUTION INTRAMUSCULAR; INTRAVENOUS; SUBCUTANEOUS
Status: DISCONTINUED | OUTPATIENT
Start: 2020-09-23 | End: 2020-10-05 | Stop reason: HOSPADM

## 2020-09-23 RX ORDER — AMOXICILLIN 250 MG
2 CAPSULE ORAL 2 TIMES DAILY
Status: DISCONTINUED | OUTPATIENT
Start: 2020-09-23 | End: 2020-10-05 | Stop reason: HOSPADM

## 2020-09-23 RX ORDER — ONDANSETRON 4 MG/1
4 TABLET, ORALLY DISINTEGRATING ORAL EVERY 6 HOURS PRN
Status: DISCONTINUED | OUTPATIENT
Start: 2020-09-23 | End: 2020-10-05 | Stop reason: HOSPADM

## 2020-09-23 RX ADMIN — TACROLIMUS 3 MG: 1 CAPSULE ORAL at 20:58

## 2020-09-23 RX ADMIN — MAGNESIUM OXIDE 400 MG: 400 TABLET ORAL at 22:37

## 2020-09-23 RX ADMIN — INSULIN ASPART 2 UNITS: 100 INJECTION, SOLUTION INTRAVENOUS; SUBCUTANEOUS at 08:17

## 2020-09-23 RX ADMIN — HYDRALAZINE HYDROCHLORIDE 75 MG: 25 TABLET ORAL at 02:51

## 2020-09-23 RX ADMIN — PIPERACILLIN SODIUM AND TAZOBACTAM SODIUM 4.5 G: 4; .5 INJECTION, POWDER, LYOPHILIZED, FOR SOLUTION INTRAVENOUS at 04:51

## 2020-09-23 RX ADMIN — SULFAMETHOXAZOLE AND TRIMETHOPRIM 1 TABLET: 400; 80 TABLET ORAL at 08:05

## 2020-09-23 RX ADMIN — HYDRALAZINE HYDROCHLORIDE 75 MG: 25 TABLET ORAL at 20:56

## 2020-09-23 RX ADMIN — VALGANCICLOVIR 450 MG: 450 TABLET, FILM COATED ORAL at 08:05

## 2020-09-23 RX ADMIN — ACETAMINOPHEN 975 MG: 325 TABLET, FILM COATED ORAL at 20:52

## 2020-09-23 RX ADMIN — INSULIN HUMAN 35 UNITS: 100 INJECTION, SUSPENSION SUBCUTANEOUS at 10:20

## 2020-09-23 RX ADMIN — VANCOMYCIN HYDROCHLORIDE 1500 MG: 10 INJECTION, POWDER, LYOPHILIZED, FOR SOLUTION INTRAVENOUS at 13:10

## 2020-09-23 RX ADMIN — PANTOPRAZOLE SODIUM 40 MG: 40 TABLET, DELAYED RELEASE ORAL at 08:05

## 2020-09-23 RX ADMIN — MYCOPHENOLATE MOFETIL 750 MG: 250 CAPSULE ORAL at 20:51

## 2020-09-23 RX ADMIN — MIDAZOLAM 2 MG: 1 INJECTION INTRAMUSCULAR; INTRAVENOUS at 18:18

## 2020-09-23 RX ADMIN — Medication 1 TABLET: at 20:58

## 2020-09-23 RX ADMIN — HYDRALAZINE HYDROCHLORIDE 75 MG: 25 TABLET ORAL at 13:11

## 2020-09-23 RX ADMIN — MYCOPHENOLATE MOFETIL 750 MG: 250 CAPSULE ORAL at 08:06

## 2020-09-23 RX ADMIN — Medication 1 TABLET: at 08:05

## 2020-09-23 RX ADMIN — LIDOCAINE HYDROCHLORIDE 80 MG: 20 INJECTION, SOLUTION INFILTRATION; PERINEURAL at 18:28

## 2020-09-23 RX ADMIN — CLOTRIMAZOLE 1 LOZENGE: 10 LOZENGE ORAL at 13:11

## 2020-09-23 RX ADMIN — CLOTRIMAZOLE 1 LOZENGE: 10 LOZENGE ORAL at 08:04

## 2020-09-23 RX ADMIN — CLOTRIMAZOLE 1 LOZENGE: 10 LOZENGE ORAL at 20:52

## 2020-09-23 RX ADMIN — INSULIN ASPART 3 UNITS: 100 INJECTION, SOLUTION INTRAVENOUS; SUBCUTANEOUS at 04:55

## 2020-09-23 RX ADMIN — PIPERACILLIN SODIUM AND TAZOBACTAM SODIUM 4.5 G: 4; .5 INJECTION, POWDER, LYOPHILIZED, FOR SOLUTION INTRAVENOUS at 22:37

## 2020-09-23 RX ADMIN — PIPERACILLIN SODIUM AND TAZOBACTAM SODIUM 4.5 G: 4; .5 INJECTION, POWDER, LYOPHILIZED, FOR SOLUTION INTRAVENOUS at 16:23

## 2020-09-23 RX ADMIN — CLOTRIMAZOLE 1 LOZENGE: 10 LOZENGE ORAL at 16:21

## 2020-09-23 RX ADMIN — ROSUVASTATIN CALCIUM 10 MG: 10 TABLET, FILM COATED ORAL at 08:06

## 2020-09-23 RX ADMIN — PREDNISONE 10 MG: 10 TABLET ORAL at 08:05

## 2020-09-23 RX ADMIN — PIPERACILLIN SODIUM AND TAZOBACTAM SODIUM 4.5 G: 4; .5 INJECTION, POWDER, LYOPHILIZED, FOR SOLUTION INTRAVENOUS at 11:56

## 2020-09-23 RX ADMIN — MAGNESIUM OXIDE 400 MG: 400 TABLET ORAL at 08:06

## 2020-09-23 RX ADMIN — PROPOFOL 100 MCG/KG/MIN: 10 INJECTION, EMULSION INTRAVENOUS at 18:28

## 2020-09-23 RX ADMIN — PREDNISONE 5 MG: 5 TABLET ORAL at 20:52

## 2020-09-23 RX ADMIN — MAGNESIUM SULFATE 2 G: 2 INJECTION INTRAVENOUS at 10:12

## 2020-09-23 RX ADMIN — TACROLIMUS 4 MG: 1 CAPSULE ORAL at 08:06

## 2020-09-23 RX ADMIN — ONDANSETRON 4 MG: 2 INJECTION INTRAMUSCULAR; INTRAVENOUS at 18:18

## 2020-09-23 RX ADMIN — TAMSULOSIN HYDROCHLORIDE 0.4 MG: 0.4 CAPSULE ORAL at 08:05

## 2020-09-23 RX ADMIN — INSULIN ASPART 4 UNITS: 100 INJECTION, SOLUTION INTRAVENOUS; SUBCUTANEOUS at 01:07

## 2020-09-23 ASSESSMENT — ACTIVITIES OF DAILY LIVING (ADL)
ADLS_ACUITY_SCORE: 12
ADLS_ACUITY_SCORE: 14
ADLS_ACUITY_SCORE: 12
ADLS_ACUITY_SCORE: 14
ADLS_ACUITY_SCORE: 12

## 2020-09-23 NOTE — PLAN OF CARE
OT 6C. Cancel. OT orders acknowledged and appreciated. Pt with other providers upon AM attempt and with plans to go to the OR for an I&D this PM. Will reschedule OT/CR evaluation.

## 2020-09-23 NOTE — UTILIZATION REVIEW
Admission Status; Secondary Review Determination     Admission Date: 9/22/2020 12:31 PM       Under the authority of the Utilization Management Committee, the utilization review process indicated a secondary review on the above patient.  The review outcome is based on review of the medical records, discussions with staff, and applying clinical experience noted on the date of the review.        (x)      Inpatient Status Appropriate - This patient's medical care is consistent with medical management for inpatient care and reasonable inpatient medical practice.       RATIONALE FOR DETERMINATION      Brief clinical presentation, information copied from the chart, abbreviated and edited for relevant content:     Lucian Henderson Sr. is a 64 year old male admitted on 9/22/2020. He was admitted  for evaluation of purulent discharge from incisional sites on his left chest wall and abdominal wall. Exam at admission was notable for ~4cm purulent incisional ulcer w/ perilesional erythema and tenderness; 2-3 cm ovoid epigastric ulcer, stained violet. CT chest/abdomen/pelvis (09/22/2020) findings are consistent w/ cellulitis of the left supraclavicular and epigastric regions, also  showed fluid collection around the previous balloon pump site where a 8 [place] chimney graft is implanted as well as fluid collection in the old ICD pocket.  Given the Abscess, left supraclavicular    and soft tissue infection, epigastric region - he was admitted inpatient. Plan for  Blood cultures, daily CBC w/ diff,  IV Zosyn 4.5 mg Q6H, IV Vancomycin 1500 mg daily, consult to Transplant ID and consult to CV Surgery   due to significant comorbid conditions and immunosuppressive status.  He has ICM s/p OHT on multiple immunosuppressants.            At the time of admission with the information available to the attending physician, more than 2 nights hospital complex care was anticipated. Also, there was a risk of adverse outcome if patient was  treated outpatient or observation. High intensity of services anticipated. Inpatient admission appropriate based on Medicare guidelines.       The information on this document is developed by the utilization review team in order for the business office to ensure compliance.  This only denotes the appropriateness of proper admission status and does not reflect the quality of care rendered.         The definitions of Inpatient Status and Observation Status used in making the determination above are those provided in the CMS Coverage Manual, Chapter 1 and Chapter 6, section 70.4.      Sincerely,      Beverley Sol MD   Utilization Review/ Case Management  [facility].

## 2020-09-23 NOTE — PROGRESS NOTES
2 RN skin check complete with Emilio RN.   Devices in place: bilateral heel mepilexs  Skin assessed under devices: yes  Confirmed pressure ulcers found: no  New potential pressure ulcers noted on: no     The following interventions in place:  Q2 turns, 2 RN skin checks, pillows in use for positioning/support, incontinence monitoring, changing bed linens as needed, barrier paste, waffle overlay, mepilex to bilateral heels, but pt ripped off. Will try to replace later when pt is more cooperative.     Bilateral elbow: pink and blanching.  Sacrum: red/blanching.   Perineum: red, moist  Bilateral heels: boggy, flaky, red/blanching.   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    Temp:  [98.8  F (37.1  C)-99.9  F (37.7  C)] 98.8  F (37.1  C)  Pulse:  [] 102  Resp:  [16-20] 18  BP: (108-149)/(48-81) 126/68  SpO2:  [95 %-99 %] 98 %

## 2020-09-23 NOTE — CONSULTS
CARDIOTHORACIC SURGERY CONSULT NOTE  9/23/2020      Reason for Consult: purulent discharge from L subclavian incision site s/p OHT      ASSESSMENT/PLAN: Patient is a 64 year old male with a history of OHT (7/19/20), DM type II, CKD stage 3, RUE DVT c/b HIT, and HTN presented with increased glucose.     - Recommend keeping pt NPO and will plan to take to the OR for I&D of L subclavian incision on 9/23/20  - Other cares per primary team  - Thank you for the opportunity to participate in the care of this patient.    Patient and plan discussed with attending, Dr Huertas.    Gaudencio Santiago, PAZORAIDA  Cardiothoracic Surgery  September 23, 2020 8:23 AM   p: [phone]      ________________________________________________________________________________________________    HPI: Patient is a 64 year old male with a history of s/p OHT on 7/19/20 presented with increased glucose (300-500s). Patient underwent CT scan which showed L supraclavicular subcutaneous soft tissue thickening and fat stranding is likely an infectious/inflammatory process originating from the pt's prior L implantable cardiac debrillator chest wall pocket.     Patient was discharged on 8/3/20 after month long admission for end stage heart failure and eventual heart transplant. He received heart transplant 7/19. His immediate post transplant course was complicated by primary graft dysfunction with EF of 20-25% on POD #1 and severe RV dysfunction likely due to prolonged ischemic time. His repeat echo 7/27 showed normal LVEF. During admission he also developed HIT and R internal jugular clot and has been on fondaparinux for anticoagulation.      He notes since discharge his blood sugars have been difficult to control. They were in the 300s but for the past few days have been in the 500s. With this he has noted feeling fatigued, dizzy, and has had increased urination. He is careful to avoid sugar and carbs and has not changed his diet recently. He had steroid induced  hyperglycemia while admitted last time and endocrine was following for this. He followed up with endocrine via virtual visit 8/18 and his NPH was increased to 50 units in AM and 40 units in evening. His prednisone is slowly being tapered.      Pt was working with RN who called to follow up on blood sugars and he reported AM glucose of 430 and direct admission arranged for diabetes management. Upon arrival, his blood sugars were in 330s. Patient reports feeling well. Denies fever, chills, shortness of breath, cough, or chest pain. He notes his lower extremity swelling is at baseline since discharge.     PMH:  Past Medical History:   Diagnosis Date     CAD (coronary artery disease)      CHF (congestive heart failure) (H)      CKD (chronic kidney disease), stage III (H)      Cortical cataract of both eyes      Diabetes (H)      Hyperlipidemia      Hypertension      Ischemic cardiomyopathy      Obesity      CHANTEL (obstructive sleep apnea)     occas cpap     Osteoarthritis        PSH:  Past Surgical History:   Procedure Laterality Date     C CABG, ARTERY-VEIN, THREE  02/2008     CATARACT IOL, RT/LT       COLONOSCOPY N/A 8/7/2019    Procedure: COLONOSCOPY, WITH POLYPECTOMY AND BIOPSY;  Surgeon: Chauncey Morataya MD;  Location:  GI     CV ANGIOGRAM CORONARY GRAFT N/A 7/2/2020    Procedure: Angiogram Coronary Graft;  Surgeon: Alex Lantigua MD;  Location: [facility] CARDIAC CATH LAB     CV CORONARY ANGIOGRAM N/A 7/2/2020    Procedure: CV CORONARY ANGIOGRAM;  Surgeon: Alex Lantigua MD;  Location: [facility] CARDIAC CATH LAB     CV HEART BIOPSY N/A 7/27/2020    Procedure: Heart Biopsy;  Surgeon: Mario Burr MD;  Location: [facility] CARDIAC CATH LAB     CV HEART BIOPSY N/A 8/3/2020    Procedure: CV HEART BIOPSY;  Surgeon: Alex Lantigua MD;  Location: [facility] CARDIAC CATH LAB     CV HEART BIOPSY N/A 8/10/2020    Procedure: CV HEART BIOPSY;  Surgeon: Mario Burr MD;  Location: [facility]  CARDIAC CATH LAB     CV HEART BIOPSY N/A 8/17/2020    Procedure: CV HEART BIOPSY;  Surgeon: Raimundo Hudson MD;  [facility]     CV HEART BIOPSY N/A 8/31/2020    Procedure: CV HEART BIOPSY;  Surgeon: Moises Santos MD;  Location:  HEART CARDIAC CATH LAB     CV HEART BIOPSY N/A 9/14/2020    Procedure: CV HEART BIOPSY;  Surgeon: Raimundo Hudson MD;  Location:  HEART CARDIAC CATH LAB     CV INTRA-AORTIC BALLOON PUMP INSERTION N/A 7/14/2020    Procedure: RHC WITH LEAVE IN SWAN/IABP;  Surgeon: Sonny Saleh MD;  Location:  HEART CARDIAC CATH LAB     CV RIGHT HEART CATH N/A 3/25/2019    Procedure: CV RIGHT HEART CATH;  Surgeon: Moises Santos MD;  Location:  HEART CARDIAC CATH LAB     CV RIGHT HEART CATH N/A 7/10/2019    Procedure: CV RIGHT HEART CATH;  Surgeon: Jak Mccabe MD;  Location:  HEART CARDIAC CATH LAB     CV RIGHT HEART CATH N/A 7/8/2020    Procedure: Right Heart Cath with Leave In swan already has ICU load;  Surgeon: Jak Mccabe MD;  Location:  HEART CARDIAC CATH LAB     CV RIGHT HEART CATH N/A 7/14/2020    Procedure: CV RIGHT HEART CATH;  Surgeon: Sonny Saleh MD;  Location:  HEART CARDIAC CATH LAB     CV RIGHT HEART CATH N/A 7/2/2020    Procedure: CV RIGHT HEART CATH;  Surgeon: Alex Lantigua MD;  Location:  HEART CARDIAC CATH LAB     CV RIGHT HEART CATH N/A 7/27/2020    Procedure: Right Heart Cath;  Surgeon: Mario Burr MD;  Location:  HEART CARDIAC CATH LAB     CV RIGHT HEART CATH N/A 8/3/2020    Procedure: Right Heart Cath;  Surgeon: Alex Lantigua MD;  Location:  HEART CARDIAC CATH LAB     CV RIGHT HEART CATH N/A 8/10/2020    Procedure: CV RIGHT HEART CATH;  Surgeon: Mario Burr MD;  Location:  HEART CARDIAC CATH LAB     CV RIGHT HEART CATH N/A 8/17/2020    Procedure: CV RIGHT HEART CATH;  Surgeon: Raimundo Hudson MD;  Location:  HEART CARDIAC CATH LAB      CV RIGHT HEART CATH N/A 8/31/2020    Procedure: CV RIGHT HEART CATH;  Surgeon: Moises Santos MD;  Location:  HEART CARDIAC CATH LAB     CV RIGHT HEART CATH N/A 9/14/2020    Procedure: CV RIGHT HEART CATH;  Surgeon: Raimundo Hudson MD;  Location:  HEART CARDIAC CATH LAB     EXTRACTION(S) DENTAL Left 7/13/2020    Procedure: EXTRACTION, TOOTH #11, 12, 13, 15, and 29;  Surgeon: Monica Chao DDS;  Location:  OR     IMPLANT AUTOMATIC IMPLANTABLE CARDIOVERTER DEFIBRILLATOR       INSERT INTRAAORTIC BALLOON PUMP N/A 7/16/2020    Procedure: Subclavian Intra-Aortic Balloon Pump Placement;  Surgeon: Allan Sparrow MD;  Location: [facility] OR     PHACOEMULSIFICATION CLEAR CORNEA WITH STANDARD IOL, VITRECTOMY PARSPLANA 25 GAGUE, COMBINED Left 12/10/2019    Procedure: PHACOEMULSIFICATION, CATARACT, CLEAR CORNEAL INCISION APPROACH, W STD INTRAOCULAR LENS IMPLANT INSERT + VITRECTOMY BY PARS PLANA  USING 25-GAUGE INSTRUMENTS. ENDOLASER, INFUSION OF 20% SF6 GAS;  Surgeon: Triny Bunch MD;  [facility]: [facility] OR     PICC DOUBLE LUMEN PLACEMENT Left 07/28/2020    5Fr - 42cm, Basilic vein, mid SVC     PICC INSERTION Right 07/11/2020    basilic 44 cm total      TRANSPLANT HEART RECIPIENT N/A 7/19/2020    Procedure: REDO MEDIAN STERNOTOMY, TRANSPLANT, ORTHOTOPIC HEART, RECIPIENT, ON PUMP OXYGENATOR, REMOVAL OF CARDIAC DEFIBRILLATOR AND LEAD;  Surgeon: Griselli, Massimo, MD;  Location:  OR       FH:  Family History   Problem Relation Age of Onset     Diabetes Brother      Diabetes Sister      Diabetes Sister      Macular Degeneration No family hx of      Glaucoma No family hx of      Myocardial Infarction No family hx of      Kidney Disease No family hx of        SH:  Social History     Socioeconomic History     Marital status:      Spouse name: Not on file     Number of children: 4     Years of education: Not on file     Highest education level: Not on file   Occupational History     Occupation:       Employer: [employer]     Employer: RETIRED   Social Needs     Financial resource strain: Not on file     Food insecurity     Worry: Not on file     Inability: Not on file     Transportation needs     Medical: Not on file     Non-medical: Not on file   Tobacco Use     Smoking status: Never Smoker     Smokeless tobacco: Never Used     Tobacco comment: Never smoked; non-smoking household   Substance and Sexual Activity     Alcohol use: No     Alcohol/week: 0.0 standard drinks     Drug use: No     Sexual activity: Yes     Partners: Female   Lifestyle     Physical activity     Days per week: Not on file     Minutes per session: Not on file     Stress: Not on file   Relationships     Social connections     Talks on phone: Not on file     Gets together: Not on file     Attends Bahai service: Not on file     Active member of club or organization: Not on file     Attends meetings of clubs or organizations: Not on file     Relationship status: Not on file     Intimate partner violence     Fear of current or ex partner: Not on file     Emotionally abused: Not on file     Physically abused: Not on file     Forced sexual activity: Not on file   Other Topics Concern     Parent/sibling w/ CABG, MI or angioplasty before 65F 55M? No   Social History Narrative     Not on file       Home Meds:  Medications Prior to Admission   Medication Sig Dispense Refill Last Dose     acetaminophen (TYLENOL) 325 MG tablet Take 3 tablets (975 mg) by mouth every 8 hours as needed for mild pain 60 tablet 3 9/21/2020     calcium carbonate 600 mg-vitamin D 400 units (CALTRATE) 600-400 MG-UNIT per tablet Take 1 tablet by mouth 2 times daily (with meals) 180 tablet 3 9/22/2020 at AM     clotrimazole (MYCELEX) 10 MG lozenge Place 1 lozenge inside cheek 4 times daily 120 lozenge 3 9/22/2020     fondaparinux ANTICOAGULANT (ARIXTRA) 7.5MG/0.6ML injection Inject 0.6 mLs (7.5 mg) Subcutaneous every 24 hours Hold dose on the mornings  of your right heart catheterization and biopsies 90 Syringe 3 9/21/2020     hydrALAZINE (APRESOLINE) 25 MG tablet Take 3 tablets (75 mg) by mouth every 8 hours 270 tablet 1 9/22/2020 at AM     insulin isophane human (HUMULIN N PEN) 100 UNIT/ML injection Inject 35 Units Subcutaneous every morning 30 mL 1 9/22/2020 at AM     insulin lispro (HUMALOG KWIKPEN) 100 UNIT/ML (1 unit dial) KWIKPEN Inject 15 - 31 units with each meal   9/22/2020     magnesium oxide 400 MG CAPS Take 400 mg by mouth 2 times daily 180 capsule 3 9/22/2020     mycophenolate (GENERIC EQUIVALENT) 250 MG capsule TAKE 6 CAPSULES BY MOUTH TWICE DAILY 1080 capsule 3 9/22/2020 at AM     pantoprazole (PROTONIX) 40 MG EC tablet Take 1 tablet (40 mg) by mouth every morning (before breakfast) 90 tablet 3 9/22/2020 at AM     polyethylene glycol (MIRALAX) 17 g packet 17 g by Oral or Feeding Tube route daily as needed for constipation 7 packet 0 Past Month     predniSONE (DELTASONE) 10 MG tablet Take 1 tablet (10 mg) by mouth every morning AND 0.5 tablets (5 mg) every evening. 180 tablet 0 9/22/2020 at AM     rosuvastatin (CRESTOR) 10 MG tablet Take 1 tablet (10 mg) by mouth daily 90 tablet 3 9/22/2020 at AM     senna-docusate (SENOKOT-S/PERICOLACE) 8.6-50 MG tablet Take 1 tablet by mouth 2 times daily as needed for constipation 50 tablet 0 Past Month     sulfamethoxazole-trimethoprim (BACTRIM) 400-80 MG tablet Take 1 tablet by mouth daily 90 tablet 3 9/22/2020 at AM     tacrolimus (GENERIC EQUIVALENT) 1 MG capsule Take 4 capsules (4 mg) by mouth every morning AND 3 capsules (3 mg) every evening. Or as directed by transplant clinic. 630 capsule 3 9/22/2020 at AM     tamsulosin (FLOMAX) 0.4 MG capsule Take 1 capsule (0.4 mg) by mouth daily 30 capsule 11 9/22/2020     valGANciclovir (VALCYTE) 450 MG tablet Take 1 tablet (450 mg) by mouth every 48 hours 30 tablet 3 9/21/2020     Alcohol Swabs PADS Use to swab the area of the injection or sergio as directed   Per  insurance coverage 100 each 0      blood glucose (ACCU-CHEK SMARTVIEW) test strip USE TO TEST THREE TIMES DAILY AS DIRECTED 100 strip 0      blood glucose (NO BRAND SPECIFIED) test strip Use to test blood sugar 2 times daily or as directed. 200 strip 3      blood glucose monitoring (ACCU-CHEK FELIPE SMARTVIEW) meter device kit Use to test blood sugar 3-4 times daily, as directed. 1 kit 0      blood glucose monitoring (ONE TOUCH DELICA) lancets Use to test blood sugars 2 times daily. 200 each 3      Continuous Blood Gluc Sensor (FREESTYLE JEREMY 14 DAY SENSOR) MISC Change every 14 days. 2 each 11      Continuous Blood Gluc Sensor (FREESTYLE JEREMY 14 DAY SENSOR) MISC Change every 14 days. 6 each 4      insulin pen needle (32G X 4 MM) 32G X 4 MM miscellaneous Use as directed by provider  Per insurance coverage 100 each 0      insulin pen needle (B-D U/F) 31G X 5 MM miscellaneous 1 Units by Device route 2 times daily Use 2 pen needles daily or as directed. 100 each 3      insulin pen needle (NOVOFINE) 30G X 8 MM miscellaneous Inject 1 Box Subcutaneous 6 times daily Use 6 pen needles daily or as directed. 200 each 3      order for DME Auto CPAP 8-15 cmH20 1 Units 0        Allergies:  Allergies   Allergen Reactions     Heparin Heparin Induced Thrombocytopenia     HIT screen sent 7/18/2020. CORRINE confirmed positive       ROS: 10 point ROS was negative other than what was mentioned in the HPI.    Physical Exam:  Temp:  [98  F (36.7  C)-99.9  F (37.7  C)] 98  F (36.7  C)  Pulse:  [] 85  Resp:  [16-20] 18  BP: (108-149)/(48-81) 134/76  SpO2:  [94 %-99 %] 95 %  Gen: NAD, resting comfortably in bed, conversational  HEENT: normocephalic, atraumatic cranium, EOMI, sclerae anicteric. Oral mucosa pink and moist, no tonsillar edema or erythema, midline trachea, nonpalpable thyroid  Lungs: CTA anteriorly  CV: RRR, telemetry SR 80s,  DP pulses symmetric. +LE edema up to knees.   Abd: BS+, NT/ND, soft  Musculoskeletal: grossly  intact  Derm: L subclavian incision with increased erythema and exudate   Prior chest tube incision sites with sutures present and superficial infection  Neuro: A+Ox3, CN II-VII grossly intact  Mental: normal mood and affect, regular rate of speech    Labs:  ABG No lab results found in last 7 days.  CBC  Recent Labs   Lab 09/23/20  0710 09/22/20  2242 09/22/20  1256 09/21/20  1017   WBC 2.4* 2.6* 3.0* 3.1*   HGB 7.9* 6.8* 7.2* 7.2*    169 187 179     BMP  Recent Labs   Lab 09/23/20  0710 09/22/20  1256 09/21/20  1017 09/18/20  0954    133 133 134   POTASSIUM 4.8 4.2 4.7 4.5   CHLORIDE 108 104 104 107   CO2 21 22 20 17*   BUN 36* 43* 45* 40*   CR 1.26* 1.50* 1.57* 1.17   * 187* 212* 252*     LFT  Recent Labs   Lab 09/23/20  0710 09/22/20  1256   AST  --  17   ALT  --  26   ALKPHOS  --  93   BILITOTAL  --  0.3   ALBUMIN  --  2.5*   INR 1.16* 1.13     PancreasNo lab results found in last 7 days.    Imaging:  Recent Results (from the past 24 hour(s))   CT Chest/Abdomen/Pelvis w Contrast    Narrative    EXAMINATION: CT CHEST/ABDOMEN/PELVIS W CONTRAST, 9/22/2020 2:36 PM    TECHNIQUE:  Helical CT images from the thoracic inlet through the  symphysis pubis were obtained  with contrast.     Contrast dose: iopamidol (ISOVUE-370) solution 104 mL    COMPARISON: 7/9/2019 CT    HISTORY: recent heart transplant(7/19/2020), purulent drainage and  swelling over left subclavian wound and abdominal wound, concern for  abscess, deeper infection, etc    FINDINGS:    Chest: Postsurgical changes of a median sternotomy orthotopic heart  transplant and removal of left chest wall ICD.     Overlying the left supraclavicular fossa and left pectoralis major  musculature in what was likely the prior left chest wall implantable  cardiac defibrillator pocket, there is  soft tissue edema/thickening  that appears to track superiorly though is incompletely visualized. No  associated drainable fluid collection. Additionally, within  the  retrosternal space/anterior mediastinum tracking inferiorly along the  pericardium and into the pericardial space anterior to the heart,  there is a simple appearing, low attenuating, thin rim enhancing fluid  collection that does not appear to involve the median sternotomy or  invade pericardial fat planes. No surrounding inflammatory fat  stranding and/or associated gas. Skin thickening and focal  subcutaneous soft tissue thickening/fluid in the midline epigastric  region corresponding to the site of a previous pericardial drain.    The tracheobronchial tree is patent. There are multiple subcentimeter  calcified granulomas through the bilateral lung fields. Small  left-sided pleural effusion. Trace right pleural effusion. No  pneumothorax. Bilateral gynecomastia. Within the right supraclavicular  fossa there is an elongated contrast-enhancing structure extending  superiorly from and continuous with the lumen of the lateral right  subclavian artery with surrounding soft tissue thickening and an  adjacent retained linear vascular access cannula, which is located  along the superficial aspect of the right pectoralis minor  musculature. There is no mediastinal or axillary adenopathy.    Abdomen and pelvis: There are no focal hepatic masses. Mild degree of  periportal edema. Portal vasculature is patent. Cholelithiasis without  cholecystitis. No intra or extra hepatic biliary dilatation. The  pancreas, spleen and adrenal glands appear normal. There is bilateral,  mild renal parenchymal atrophy. No nephrolithiasis. Mild bilateral  hydroureter. No evidence of ureteral obstruction. The small and large  bowel are of normal caliber. The appendix is normal-appearing. There  is no pelvic, retroperitoneal or inguinal adenopathy. No free  abdominal or pelvic fluid.    Bones and soft tissues: Degenerative changes most notable in the mid  thoracic spine. No suspicious osseous or soft tissue lesions.      Impression     IMPRESSION:     In this patient who is approximately 2 months postop orthotopic heart  transplant:     1. Left supraclavicular subcutaneous soft tissue thickening and fat  stranding is likely an infectious/inflammatory process originating  from the patient's prior left implantable cardiac defibrillator chest  wall pocket. No appreciable associated drainable fluid collection,  though this process is incompletely visualized, extending above the  field-of-view.    2. Simple appearing anterior mediastinal/pericardial fluid collection  with thin rim enhancement but no significant associated inflammatory  fat stranding, likely a postoperative serous collection. There is no  evidence of sternal wound involvement or communication with the  presumed left supraclavicular fossa cellulitis.     3. Small region of isolated skin/subcutaneous soft tissue thickening  in the epigastric region, likely corresponding to the site of a prior  pericardial drain and suggestive of cellulitis. No drainable fluid  collection at this location.     4. Retained vascular access cannula in the right supraclavicular fossa  with an adjacent elongated subclavian artery pseudoaneurysm, contained  by surrounded by soft tissue thickening.    4. Small left and trace right pleural effusions.    5. Cholelithiasis without cholecystitis.    I have personally reviewed the examination and initial interpretation  and I agree with the findings.    HAM WALLS, DO

## 2020-09-23 NOTE — ANESTHESIA PREPROCEDURE EVALUATION
Anesthesia Pre-Procedure Evaluation    Patient: Lucian Henderson .   MRN:     [id number] Gender:   male   Age:    64 year old :      1955        Preoperative Diagnosis: Wound infection [T14.8XXA, L08.9]   Procedure(s):  INCISION AND DRAINAGE, SUPRACLAVICULAR WOUND INFECTION     LABS:  CBC:   Lab Results   Component Value Date    WBC 2.4 (L) 2020    WBC 2.6 (L) 2020    HGB 7.9 (L) 2020    HGB 6.8 (LL) 2020    HCT 25.5 (L) 2020    HCT 22.2 (L) 2020     2020     2020     BMP:   Lab Results   Component Value Date     2020     2020    POTASSIUM 4.8 2020    POTASSIUM 4.2 2020    CHLORIDE 108 2020    CHLORIDE 104 2020    CO2 21 2020    CO2 22 2020    BUN 36 (H) 2020    BUN 43 (H) 2020    CR 1.26 (H) 2020    CR 1.50 (H) 2020     (H) 2020     (H) 2020     COAGS:   Lab Results   Component Value Date    PTT 31 2020    INR 1.16 (H) 2020    FIBR 295 2020     POC:   Lab Results   Component Value Date    BGM 93 2020     OTHER:   Lab Results   Component Value Date    PH 7.47 (H) 2020    LACT 1.3 2020    A1C 8.2 (H) 2020    BRYANNA 8.2 (L) 2020    PHOS 1.8 (L) 2020    MAG 1.8 2020    ALBUMIN 2.5 (L) 2020    PROTTOTAL 5.5 (L) 2020    ALT 26 2020    AST 17 2020    ALKPHOS 93 2020    BILITOTAL 0.3 2020    AMYLASE 36 2020    TSH 0.80 2020    CRP 42.0 (H) 2020        Preop Vitals    BP Readings from Last 3 Encounters:   20 108/78   20 (!) 142/72   20 135/70    Pulse Readings from Last 3 Encounters:   20 80   20 99   20 95      Resp Readings from Last 3 Encounters:   20 16   20 16   20 18    SpO2 Readings from Last 3 Encounters:   20 98%   20 99%   20 97%      Temp Readings  "from Last 1 Encounters:   09/23/20 36.8  C (98.2  F) (Oral)    Ht Readings from Last 1 Encounters:   08/17/20 1.651 m (5' 5\")      Wt Readings from Last 1 Encounters:   09/23/20 84.1 kg (185 lb 8 oz)    Estimated body mass index is 30.87 kg/m  as calculated from the following:    Height as of 8/24/20: 1.651 m (5' 5\").    Weight as of this encounter: 84.1 kg (185 lb 8 oz).     LDA:  Peripheral IV 09/22/20 Left Upper forearm (Active)   Site Assessment WDL 09/23/20 1613   Line Status Saline locked 09/23/20 1613   Phlebitis Scale 0-->no symptoms 09/23/20 1613   Infiltration Scale 0 09/23/20 1613   Extravasation? No 09/23/20 1613   Number of days: 1        Past Medical History:   Diagnosis Date     CAD (coronary artery disease)      CHF (congestive heart failure) (H)      CKD (chronic kidney disease), stage III (H)      Cortical cataract of both eyes      Diabetes (H)      Hyperlipidemia      Hypertension      Ischemic cardiomyopathy      Obesity      CHANTEL (obstructive sleep apnea)     occas cpap     Osteoarthritis       Past Surgical History:   Procedure Laterality Date     C CABG, ARTERY-VEIN, THREE  02/2008     CATARACT IOL, RT/LT       COLONOSCOPY N/A 8/7/2019    Procedure: COLONOSCOPY, WITH POLYPECTOMY AND BIOPSY;  Surgeon: Chauncey Morataya MD;  Location:  GI     CV ANGIOGRAM CORONARY GRAFT N/A 7/2/2020    Procedure: Angiogram Coronary Graft;  Surgeon: Alex Lantigua MD;  Location:  HEART CARDIAC CATH LAB     CV CORONARY ANGIOGRAM N/A 7/2/2020    Procedure: CV CORONARY ANGIOGRAM;  Surgeon: Alex Lantigua MD;  Location:  HEART CARDIAC CATH LAB     CV HEART BIOPSY N/A 7/27/2020    Procedure: Heart Biopsy;  Surgeon: Mario Burr MD;  Location:  HEART CARDIAC CATH LAB     CV HEART BIOPSY N/A 8/3/2020    Procedure: CV HEART BIOPSY;  Surgeon: Alex Lantigua MD;  Location:  HEART CARDIAC CATH LAB     CV HEART BIOPSY N/A 8/10/2020    Procedure: CV HEART BIOPSY;  Surgeon: Leno" Mario uRiz MD;  Location:  HEART CARDIAC CATH LAB     CV HEART BIOPSY N/A 8/17/2020    Procedure: CV HEART BIOPSY;  Surgeon: Raimundo Hudson MD;  Location:  HEART CARDIAC CATH LAB     CV HEART BIOPSY N/A 8/31/2020    Procedure: CV HEART BIOPSY;  Surgeon: Moises Santos MD;  Location:  HEART CARDIAC CATH LAB     CV HEART BIOPSY N/A 9/14/2020    Procedure: CV HEART BIOPSY;  Surgeon: Raimundo Hudson MD;  Location:  HEART CARDIAC CATH LAB     CV INTRA-AORTIC BALLOON PUMP INSERTION N/A 7/14/2020    Procedure: RHC WITH LEAVE IN SWAN/IABP;  Surgeon: Sonny Saleh MD;  Location:  HEART CARDIAC CATH LAB     CV RIGHT HEART CATH N/A 3/25/2019    Procedure: CV RIGHT HEART CATH;  Surgeon: Moises Santos MD;  Location:  HEART CARDIAC CATH LAB     CV RIGHT HEART CATH N/A 7/10/2019    Procedure: CV RIGHT HEART CATH;  Surgeon: Jak Mccabe MD;  Location:  HEART CARDIAC CATH LAB     CV RIGHT HEART CATH N/A 7/8/2020    Procedure: Right Heart Cath with Leave In swan already has ICU load;  Surgeon: Jak Mccabe MD;  Location:  HEART CARDIAC CATH LAB     CV RIGHT HEART CATH N/A 7/14/2020    Procedure: CV RIGHT HEART CATH;  Surgeon: Sonny Saleh MD;  Location:  HEART CARDIAC CATH LAB     CV RIGHT HEART CATH N/A 7/2/2020    Procedure: CV RIGHT HEART CATH;  Surgeon: Alex Lantigua MD;  Location:  HEART CARDIAC CATH LAB     CV RIGHT HEART CATH N/A 7/27/2020    Procedure: Right Heart Cath;  Surgeon: Mario Burr MD;  Location:  HEART CARDIAC CATH LAB     CV RIGHT HEART CATH N/A 8/3/2020    Procedure: Right Heart Cath;  Surgeon: Alex Lantigua MD;  Location:  HEART CARDIAC CATH LAB     CV RIGHT HEART CATH N/A 8/10/2020    Procedure: CV RIGHT HEART CATH;  Surgeon: Mario Burr MD;  Location:  HEART CARDIAC CATH LAB     CV RIGHT HEART CATH N/A 8/17/2020    Procedure: CV RIGHT HEART CATH;  Surgeon: Raimundo Hudson MD;   Location:  HEART CARDIAC CATH LAB     CV RIGHT HEART CATH N/A 8/31/2020    Procedure: CV RIGHT HEART CATH;  Surgeon: Moises Santos MD;  Location:  HEART CARDIAC CATH LAB     CV RIGHT HEART CATH N/A 9/14/2020    Procedure: CV RIGHT HEART CATH;  Surgeon: Raimundo Hudson MD;  Location:  HEART CARDIAC CATH LAB     EXTRACTION(S) DENTAL Left 7/13/2020    Procedure: EXTRACTION, TOOTH #11, 12, 13, 15, and 29;  Surgeon: Monica Chao DDS;  Location: [facility] OR     IMPLANT AUTOMATIC IMPLANTABLE CARDIOVERTER DEFIBRILLATOR       INSERT INTRAAORTIC BALLOON PUMP N/A 7/16/2020    Procedure: Subclavian Intra-Aortic Balloon Pump Placement;  Surgeon: Allan Sparrow MD;  Location: [facility] OR     PHACOEMULSIFICATION CLEAR CORNEA WITH STANDARD IOL, VITRECTOMY PARSPLANA 25 GAGUE, COMBINED Left 12/10/2019    Procedure: PHACOEMULSIFICATION, CATARACT, CLEAR CORNEAL INCISION APPROACH, W STD INTRAOCULAR LENS IMPLANT INSERT + VITRECTOMY BY PARS PLANA  USING 25-GAUGE INSTRUMENTS. ENDOLASER, INFUSION OF 20% SF6 GAS;  Surgeon: Triny Bunch MD;  Location: [facility] OR     PICC DOUBLE LUMEN PLACEMENT Left 07/28/2020    5Fr - 42cm, Basilic vein, mid SVC     PICC INSERTION Right 07/11/2020    basilic 44 cm total      TRANSPLANT HEART RECIPIENT N/A 7/19/2020    Procedure: REDO MEDIAN STERNOTOMY, TRANSPLANT, ORTHOTOPIC HEART, RECIPIENT, ON PUMP OXYGENATOR, REMOVAL OF CARDIAC DEFIBRILLATOR AND LEAD;  Surgeon: Griselli, Massimo, MD;  Location: [facility] OR      Allergies   Allergen Reactions     Heparin Heparin Induced Thrombocytopenia     HIT screen sent 7/18/2020. CORRINE confirmed positive        Anesthesia Evaluation     . Pt has had prior anesthetic. Type: General    No history of anesthetic complications          ROS/MED HX    ENT/Pulmonary:     (+)sleep apnea, , . .    Neurologic:  - neg neurologic ROS     Cardiovascular: Comment: Post heart transplant 7/2020, early graft failure though recovered by POD#6, now normal LVEF, normal RV  function, no valvular issues. Subclavian IABP site infection    (+) hypertension----. : . . . :. . Previous cardiac testing       METS/Exercise Tolerance:     Hematologic: Comments: Hx of HIT    (+) Anemia, -      Musculoskeletal:         GI/Hepatic:         Renal/Genitourinary:     (+) chronic renal disease, type: CRI,       Endo:     (+) type II DM Obesity, .      Psychiatric:  - neg psychiatric ROS       Infectious Disease: Comment: Subclavian IABP site infection        Malignancy:      - no malignancy   Other:                     JZG [facility] AN PHYSICAL EXAM    Assessment:   ASA SCORE: 4    H&P: History and physical reviewed and following examination; no interval change.   Smoking Status:  Non-Smoker/Unknown   NPO Status: NPO Appropriate     Plan:   Anes. Type:  MAC   Pre-Medication: None   Induction:  IV (Standard)      Access/Monitoring: PIV   Maintenance: Propofol Sedation     Postop Plan:   Postop Pain: Opioids  Postop Sedation/Airway: Not planned  Disposition: Inpatient/Admit     PONV Management:   Adult Risk Factors:, Non-Smoker, Postop Opioids   Prevention: Ondansetron, Propofol     CONSENT: Direct conversation   Plan and risks discussed with: Patient   Blood Products: Consented (ALL Blood Products)       Comments for Plan/Consent:  Pacemaker explanted during heart transplant, confirmed on CT scan.                 Isidro Scales MD

## 2020-09-23 NOTE — CONSULTS
[facility]    Transplant Infectious Diseases Inpatient Consultation      Lucian Henderson . MRN# [id number]   YOB: 1955 Age: 64 year old   Date of Admission and time: 9/22/2020 12:31 PM     Reason for consult: I was asked by Dr. De Leon to evaluate this patient for surgical wound infection.             Recommendations:   1. Agree with debridement of the left subclavian wound.   2. The midline abdominal wound needs to be debrided as well.   - please send intraoperative samples for Gram stain and aerobic cx.   3. A wound swab of the abdominal wound was sent for Gram stain and cx.   4. Agree with zosyn and vancomycin for now.         Summary of Presentation:   Transplants:  7/19/2020 (Heart), Postoperative day:  66     This patient is a 64 year old male with ICMP s/p OHT with basiliximab induction and TAC/MMF/prednisone maintenance.   Course complicated by DGF, HIT, RUE DVT.   Presented from cardiology clinic with wound infection.         Active Problems and Infectious Diseases Issues:   1. Surgical wound infection involving the left subclavian wound (prior ICD site) and the abdominal wound (prior drain site).   The subclavian wound was cx with results of NLF GNB. Will continue zosyn.   I swabbed the abdominal wound.   Will continue vancomycin for the possibility of co-infection with GPC for now pending wound cultures.   The chimney graft in the right subclavian area does not seem to be involved. But will follow on blood cx for the possibility of secondary seeding.   ID will follow with you.     2. EBV viremia  No signs or symptoms for PTLD.   Monitor EBV by PCR per your protocol but I would avoid checking EBV PCR now due to acute illness which may result in falsely elevated viral load.         Old Problems and Infectious Diseases Issues:   1. Donor with S salivarius positive blood cx; the patient received the usual perioperative ABx then ceftriaxone for total ABx of 7  days.   2. Donor sputum with P fluorescens that failed to grow and Enterobacter spp. The recipient was not treated for these organisms.      Other Infectious Disease issues include:  - QTc 420 as of 9/22/202.   - PCP prophylaxis: Bactrim   - Serostatus: CMV D+/R+, EBV D+/R+, HSV1+/2-, VZV +        On VGCV for universal ppx.   - Immunization status: Too soon to be discussed.   - Gamma globulin status: not recently checked      Thank you very much Dr. De Leon for involving me in the care of Mr. Lucian Henderson Sr.. Please do not hesitate to call me for any question.     Attestation:  I interviewed the patient and obtained history from the patient, the wife at the bedside, and by reviewing the patient's chart including outside records, microbiological data, and radiological data. All data are summarized in this notes.  Jillian Hurtado MD   Pager: [phone]  09/23/2020             History of Present Illness:   Transplants:  7/19/2020 (Heart), Postoperative day:  66     This patient is a 64 year old male with ICMP s/p OHT with basiliximab induction and TAC/MMF/prednisone maintenance. He had ICD removed from the left subclavian area and right subclavian IABP placed then removed with chimney graft in place.   Course complicated by DGF, HIT, RUE DVT.  The patient presented to the transplant clinic 9/22/20 and was found to have erythema, swelling, tenderness and warmth of the left subclavian and midline abdominal wounds. He was sent to [facility] where blood cx were obtained, wound swab of the left subclavian wound were sent. The patient was then started on zosyn and vancomycin.     ID consulted.     The patient stated that 5 days before admission, he started to develop pain in the involved areas for which he took tylenol. He denied fever or chills. He denied seeing drainage. Denied trauma to the areas.     Exposure History  Was born in [place], stated that he lived for [place] in the [place], lived in [place] for 50-60  years.   He lives in [place], [place]. Lives with wife and one dog.   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   He stated he was tested for TB in the remote past and was negative.   He denied exposure to TB.   No institutionalization.   He travels to [place] frequently. His last travel outside of [place] was to [place] 2 years ago.   No significant outdoors activities.                 Review of Systems:      As mentioned in the HPI otherwise negative by reviewing constitutional symptoms, central and peripheral neurological systems, respiratory system, cardiac system, GI system,  system, musculoskeletal, skin, allergy, and lymphatics.                  Past Medical History:     Past Medical History:   Diagnosis Date     CAD (coronary artery disease)      CHF (congestive heart failure) (H)      CKD (chronic kidney disease), stage III (H)      Cortical cataract of both eyes      Diabetes (H)      Hyperlipidemia      Hypertension      Ischemic cardiomyopathy      Obesity      CHANTEL (obstructive sleep apnea)     occas cpap     Osteoarthritis             Past Surgical History:     Past Surgical History:   Procedure Laterality Date     C CABG, ARTERY-VEIN, THREE  02/2008     CATARACT IOL, RT/LT       COLONOSCOPY N/A 8/7/2019    Procedure: COLONOSCOPY, WITH POLYPECTOMY AND BIOPSY;  Surgeon: Chauncey Morataya MD;  Location:  GI     CV ANGIOGRAM CORONARY GRAFT N/A 7/2/2020    Procedure: Angiogram Coronary Graft;  Surgeon: Alex Lantigua MD;  Location:  HEART CARDIAC CATH LAB     CV CORONARY ANGIOGRAM N/A 7/2/2020    Procedure: CV CORONARY ANGIOGRAM;  Surgeon: Alex Lantigua MD;  Location:  HEART CARDIAC CATH LAB     CV HEART BIOPSY N/A 7/27/2020    Procedure: Heart Biopsy;  Surgeon: Mario Burr MD;  Location:  HEART CARDIAC CATH LAB     CV HEART BIOPSY N/A 8/3/2020    Procedure: CV HEART BIOPSY;  Surgeon: Alex Lantigua MD;  Location:  HEART CARDIAC CATH LAB      CV HEART BIOPSY N/A 8/10/2020    Procedure: CV HEART BIOPSY;  Surgeon: Mario Burr MD;  Location: [facility]     CV HEART BIOPSY N/A 8/17/2020    Procedure: CV HEART BIOPSY;  Surgeon: Raimundo Hudson MD;  Location: [facility] HEART CARDIAC CATH LAB     CV HEART BIOPSY N/A 8/31/2020    Procedure: CV HEART BIOPSY;  Surgeon: Moises Santos MD;  Location: [facility]ARD[facility]     CV HEART BIOPSY N/A 9/14/2020    Procedure: CV HEART BIOPSY;  Surgeon: Raimundo Hudson MD;  Location: [facility] HEART CARDIAC CATH LAB     CV INTRA-AORTIC BALLOON PUMP INSERTION N/A 7/14/2020    Procedure: RHC WITH LEAVE IN SWAN/IABP;  Surgeon: Sonny Saleh MD;  Location: [facility] HEART CARDIAC CATH LAB     CV RIGHT HEART CATH N/A 3/25/2019    Procedure: CV RIGHT HEART CATH;  Surgeon: Moises Santos MD;  Location: [facility] HEART CARDIAC CATH LAB     CV RIGHT HEART CATH N/A 7/10/2019    Procedure: CV RIGHT HEART CATH;  Surgeon: Jak Mccabe MD;  Location: [facility] HEART CARDIAC CATH LAB     CV RIGHT HEART CATH N/A 7/8/2020    Procedure: Right Heart Cath with Leave In swan already has ICU load;  Surgeon: Jak Mccabe MD;  Location: [facility] HEART CARDIAC CATH LAB     CV RIGHT HEART CATH N/A 7/14/2020    Procedure: CV RIGHT HEART CATH;  Surgeon: Sonny Saleh MD;  Location: [facility] HE[facility] CARDIAC CATH LAB     CV RIGHT HEART CATH N/A 7/2/2020    Procedure: CV RIGHT HEART CATH;  Surgeon: Alex Lantigua MD;  Location: [facility] HE[facility] CARDIAC CATH LAB     CV RIGHT HEART CATH N/A 7/27/2020    Procedure: Right Heart Cath;  Surgeon: Mario Burr MD;  [facility]: [facility]     CV RIGHT HEART CATH N/A 8/3/2020    Procedure: Right Heart Cath;  Surgeon: Alex Lantigua MD;  Location: [facility] CARDIAC CATH LAB     CV RIGHT HEART CATH N/A 8/10/2020    Procedure: CV RIGHT HEART CATH;  Surgeon: Mario Burr MD;  Location:  HEART CARDIAC CATH LAB     CV RIGHT HEART CATH N/A 8/17/2020     Procedure: CV RIGHT HEART CATH;  Surgeon: Raimundo Hudson MD;  Location:  HEART CARDIAC CATH LAB     CV RIGHT HEART CATH N/A 8/31/2020    Procedure: CV RIGHT HEART CATH;  Surgeon: Moises Santos MD;  Location:  HEART CARDIAC CATH LAB     CV RIGHT HEART CATH N/A 9/14/2020    Procedure: CV RIGHT HEART CATH;  Surgeon: Raimundo Hudson MD;  Location:  HEART CARDIAC CATH LAB     EXTRACTION(S) DENTAL Left 7/13/2020    Procedure: EXTRACTION, TOOTH #11, 12, 13, 15, and 29;  Surgeon: Monica Chao DDS;  Location:  OR     IMPLANT AUTOMATIC IMPLANTABLE CARDIOVERTER DEFIBRILLATOR       INSERT INTRAAORTIC BALLOON PUMP N/A 7/16/2020    Procedure: Subclavian Intra-Aortic Balloon Pump Placement;  Surgeon: Allan Sparrow MD;  Location: [facility] OR     PHACOEMULSIFICATION CLEAR CORNEA WITH STANDARD IOL, VITRECTOMY PARSPLANA 25 GAGUE, COMBINED Left 12/10/2019    Procedure: PHACOEMULSIFICATION, CATARACT, CLEAR CORNEAL INCISION APPROACH, W STD INTRAOCULAR LENS IMPLANT INSERT + VITRECTOMY BY PARS PLANA  USING 25-GAUGE INSTRUMENTS. ENDOLASER, INFUSION OF 20% SF6 GAS;  Surgeon: Triny Bunch MD;  Location: [facility]     PICC DOUBLE LUMEN PLACEMENT Left 07/28/2020    5Fr - 42cm, Basilic vein, mid SVC     PICC INSERTION Right 07/11/2020    basilic 44 cm total      TRANSPLANT HEART RECIPIENT N/A 7/19/2020    Procedure: REDO MEDIAN STERNOTOMY, TRANSPLANT, ORTHOTOPIC HEART, RECIPIENT, ON PUMP OXYGENATOR, REMOVAL OF CARDIAC DEFIBRILLATOR AND LEAD;  Surgeon: Griselli, Massimo, MD;  Location:  OR            Social History:     Social History     Tobacco Use     Smoking status: Never Smoker     Smokeless tobacco: Never Used     Tobacco comment: Never smoked; non-smoking household   Substance Use Topics     Alcohol use: No     Alcohol/week: 0.0 standard drinks            Family History:   I have reviewed this patient's family history  Family History   Problem Relation Age of Onset     Diabetes Brother       Diabetes Sister      Diabetes Sister      Macular Degeneration No family hx of      Glaucoma No family hx of      Myocardial Infarction No family hx of      Kidney Disease No family hx of             Immunizations:     Immunization History   Administered Date(s) Administered     Influenza (IIV3) PF 10/05/2011, 10/15/2012     Influenza Vaccine IM > 6 months Valent IIV4 10/27/2015, 09/14/2016, 10/05/2017, 10/03/2018     Pneumococcal 23 valent 08/04/2009, 04/03/2015     TDAP Vaccine (Adacel) 08/04/2009            Allergies:     Allergies   Allergen Reactions     Heparin Heparin Induced Thrombocytopenia     HIT screen sent 7/18/2020. CORRINE confirmed positive             Medications:   Medications that Require Transfusion:     - MEDICATION INSTRUCTIONS -       - MEDICATION INSTRUCTIONS -       ACE/ARB/ARNI NOT PRESCRIBED       Scheduled Medications:     calcium carbonate 600 mg-vitamin D 400 units  1 tablet Oral BID w/meals     clotrimazole  1 lozenge Buccal 4x Daily     [Held by provider] fondaparinux ANTICOAGULANT  7.5 mg Subcutaneous Q24H     hydrALAZINE  75 mg Oral Q8H     insulin aspart  1-6 Units Subcutaneous Q4H     insulin isophane human  35 Units Subcutaneous QAM     magnesium oxide  400 mg Oral BID     mycophenolate  750 mg Oral BID     pantoprazole  40 mg Oral QAM AC     piperacillin-tazobactam  4.5 g Intravenous Q6H     predniSONE  10 mg Oral QAM AC     predniSONE  5 mg Oral QPM     rosuvastatin  10 mg Oral Daily     sulfamethoxazole-trimethoprim  1 tablet Oral Daily     tacrolimus  3 mg Oral QPM     tacrolimus  4 mg Oral QAM     tamsulosin  0.4 mg Oral Daily     [START ON 9/24/2020] valGANciclovir  900 mg Oral Daily     vancomycin (VANCOCIN) IV  1,500 mg Intravenous Q24H               Physical Exam:   Temp: 98  F (36.7  C) Temp src: Oral BP: 134/76 Pulse: 85   Resp: 18 SpO2: 95 % O2 Device: None (Room air)      Wt Readings from Last 4 Encounters:   09/23/20 84.1 kg (185 lb 8 oz)   08/28/20 76.9 kg (169 lb  8.5 oz)   08/17/20 79.8 kg (176 lb)   08/10/20 79.8 kg (176 lb)     Constitutional: awake, alert, cooperative, no apparent distress and appears at stated age, well nourished.   Head, ENT, Eyes, and Neck: Normocephalic, sinuses non-tender to palpation, external ears without lesions, moist buccal mucosa without oral thrush or ulcers, tonsils without swelling, erythema, or exudate, no tenderness palpating teeth, OK dentition with dry socket in the left maxillary ridge, gums without necrosis or abscesses.   PERRL, EOMI, pink conjunctivae, non-icteric sclera.   Neck supple without rigidity, no cervical/axillary/inguinal LA bilaterally.    Neurologic: Patient is moving all extremities without focal deficit, no focal sensory loss.   Lungs: CTA bilaterally, no accessory muscle use, no dullness to percussion and no abnormal tactile fremitus.   CVS: RRR, normal S1/S2, no murmur, PMI was not displaced.   Abdomen: non-tender, non-distended, no masses, no bruit, no shifting dullness, normal BS.   Genitalia: villalobos catheter in place, no lesions were visualized and no tenderness to palpation.   Extremities: +4 pitting edema of bilateral lower extremities, no ulcers, normal ROM of all joints, no swelling or erythema of any of joints and no tenderness to palpation.   Skin: the left subclavian wound and to less extent the midline abdominal wound are with induration, fluctuation, erythema, and with purulent discharge. The sternal wound and the right subclavian wound are both without induration/fluctuation/discharge/erythema. No rash            Data:   No results found for: ACD4    Inflammatory Markers    Recent Labs   Lab Test 09/23/20  0710 09/22/20  1256 08/25/20  0445 07/08/19  1021   CRP 42.0* 16.0* <2.9 9.9       Immune Globulin Studies     Recent Labs   Lab Test 04/09/15  0721   IGG 1,310   IGM 38*          Metabolic Studies       Recent Labs   Lab Test 09/23/20  0710 09/22/20  1313 09/22/20  1256 09/21/20  1017  09/18/20  0954 09/14/20  1153 08/31/20  0856  08/17/20  0847  07/22/20  1608 07/22/20  0845  07/03/20  1559     --  133 133 134 135 132*   < > 133   < > 140  --    < > 133   POTASSIUM 4.8  --  4.2 4.7 4.5 4.1 5.2   < > 4.8   < > 4.9  --    < > 4.4   CHLORIDE 108  --  104 104 107 106 105   < > 102   < > 110*  --    < > 102   CO2 21  --  22 20 17* 22 23   < > 26   < > 25  --    < > 25   ANIONGAP 7  --  7 9 10 7 4   < > 6   < > 5  --    < > 6   BUN 36*  --  43* 45* 40* 47* 68*   < > 67*   < > 40*  --    < > 33*   CR 1.26*  --  1.50* 1.57* 1.17 1.30* 1.77*   < > 1.67*   < > 1.47*  --    < > 1.65*   GFRESTIMATED 59* 47* 48* 45* 64 56* 39*   < > 42*   < > 49*  --    < > 42*   *  --  187* 212* 252* 111* 281*   < > 391*   < > 134*  --    < > 229*   A1C  --   --   --   --   --   --   --   --   --   --   --   --   --  8.2*   BRYANNA 8.2*  --  8.0* 7.9* 8.0* 7.8* 8.4*   < > 8.3*   < > 8.1*  --    < > 8.7   PHOS  --   --   --  1.8* 1.5* 2.2* 3.3  --  3.5   < > 2.8  --    < >  --    MAG 1.8  --  1.9 1.1* 1.5* 1.3* 1.8   < > 1.9   < > 2.3  --    < > 2.2   LACT  --   --   --   --   --   --   --   --   --   --  1.0 0.9   < >  --    CKT  --   --   --   --   --   --   --   --  76  --   --   --   --   --     < > = values in this interval not displayed.       Hepatic Studies    Recent Labs   Lab Test 09/22/20  1256 09/14/20  1153 08/31/20  0856 08/25/20  1705 08/24/20  2026 08/17/20  0847 08/13/20  0713  07/08/19  1021   BILITOTAL 0.3 0.3 0.4  --  0.5 0.5 0.6   < > 0.6   ALKPHOS 93 100 117  --  124 141 91   < > 126   ALBUMIN 2.5* 2.7* 2.7*  --  2.7* 2.7* 2.6*   < > 3.4   AST 17 19 13  --  12 13 16   < > 14   ALT 26 26 31  --  28 29 27   < > 20   LDH  --   --   --  433*  --   --   --   --  174    < > = values in this interval not displayed.       Pancreatitis testing    Recent Labs   Lab Test 08/17/20  0847 07/19/20  1613 07/08/19  1021 08/16/18  0834 07/05/17  0732 07/06/16  0717 06/27/15  0755   AMYLASE  --  36  --   --   --    --   --    TRIG 82  --  181* 297* 226* 225* 99       Hematology Studies      Recent Labs   Lab Test 09/23/20  0710 09/22/20  2242 09/22/20  1256 09/21/20  1017 09/18/20  0954 09/14/20  1153  08/24/20  2026  07/26/20  0545  07/19/20  1613  07/08/19  1021   WBC 2.4* 2.6* 3.0* 3.1* 4.2 4.0   < > 4.9   < > 9.2   < > 7.7   < > 7.7   ANEU  --  2.4 2.8  --   --   --   --  4.2  --  8.5*  --  6.0  --  4.9   ALYM  --  0.0* 0.0*  --   --   --   --  0.3*  --  0.1*  --  0.6*  --  1.7   ELAN  --  0.1 0.2  --   --   --   --  0.2  --  0.3  --  0.8  --  0.8   AEOS  --  0.0 0.0  --   --   --   --  0.0  --  0.0  --  0.3  --  0.3   HGB 7.9* 6.8* 7.2* 7.2* 7.7* 7.9*   < > 6.8*   < > 8.2*   < > 10.8*   < > 13.4   HCT 25.5* 22.2* 23.5* 22.9* 24.5* 25.7*   < > 21.9*   < > 25.8*   < > 34.0*   < > 42.5    169 187 179 152 199   < > 193   < > 216   < > 99*   < > 235    < > = values in this interval not displayed.       Clotting Studies    Recent Labs   Lab Test 09/23/20  0710 09/22/20  1256 08/25/20  0445 08/17/20  0847 08/03/20  0559 08/02/20  0641 08/01/20  0702   INR 1.16* 1.13 1.11 1.16*  --   --   --    PTT  --  31  --   --  29 29 25       Arterial Blood Gas Testing    Recent Labs   Lab Test 07/24/20  0829 07/24/20  0413 07/23/20  2109 07/23/20  0404 07/22/20  2122 07/22/20  1608 07/22/20  1206 07/22/20  0845   PH  --   --   --  7.47* 7.44 7.44 7.43 7.41   PCO2  --   --   --  37 38 39 32* 34*   PO2  --   --   --  104 111* 80 103 123*   HCO3  --   --   --  27 26 27 22 21   O2PER 1.0L 1.5L 1.5L 40  40 40 40  40 40.0 40.0  40.0        Urine Studies     Recent Labs   Lab Test 09/22/20  1448 08/25/20  0731 07/19/20  1930 07/17/20  1402 07/08/19  1017   URINEPH 5.0 5.0 6.0 5.0 6.0   NITRITE Negative Negative Negative Negative Negative   LEUKEST Negative Negative Negative Large* Large*   WBCU 1 0 <1 13* >182*       Tacrolimus levels    Invalid input(s): TACROLIMUS, TAC, TACR  Transplant Immunosuppression Labs Latest Ref Rng & Units  9/23/2020 9/22/2020 9/22/2020 9/22/2020 9/21/2020   Tacro Level 5.0 - 15.0 ug/L - - - - 6.5   Tacro Level - - - - - 09/20/20 2130   Creat 0.66 - 1.25 mg/dL 1.26(H) - 1.5(H) 1.50(H) 1.57(H)   BUN 7 - 30 mg/dL 36(H) - - 43(H) 45(H)   WBC 4.0 - 11.0 10e9/L 2.4(L) 2.6(L) - 3.0(L) 3.1(L)   Neutrophil % - 94.0 - 92.9 -   ANEU 1.6 - 8.3 10e9/L - 2.4 - 2.8 -       Microbiology:  Blood cx negative   Wound cx with NLF GNB.   Last check of C difficile  No results found for: CDBPCT    Virology:  CMV viral loads  No results found for: 39779, 37796, 08023, 39144, CMVQAL  CMV viral loads    Recent Labs   Lab Test 08/17/20  0847   CSPEC Plasma, EDTA anticoagulant   CMVLOG Not Calculated       CMV viral loads    Log IU/mL of CMVQNT   Date Value Ref Range Status   08/17/2020 Not Calculated <2.1 [Log_IU]/mL Final       CMV resistance testing  No lab results found.  No results found for: CMVCID, CMVFOS, CMVGAN     No results found for: H6RES    EBV DNA Copies/mL   Date Value Ref Range Status   08/17/2020 766 (A) EBVNEG^EBV DNA Not Detected [Copies]/mL Final       CMV Antibody IgG   Date Value Ref Range Status   07/19/2020 >8.0 (H) 0.0 - 0.8 AI Final     Comment:     Positive  Antibody index (AI) values reflect qualitative changes in antibody   concentration that cannot be directly associated with clinical condition or   disease state.     07/08/2019 >8.0 (H) 0.0 - 0.8 AI Final     Comment:     Positive  Antibody index (AI) values reflect qualitative changes in antibody   concentration that cannot be directly associated with clinical condition or   disease state.         Toxoplasma Antibody IgG   Date Value Ref Range Status   07/08/2019 <3.0 0.0 - 7.1 IU/mL Final     Comment:     Negative- Absence of detectable Toxoplasma gondii IgG antibodies. A negative   result does not rule out acute infection.  The magnitude of the measured result is not indicative of the amount of   antibody present. The concentrations of anti-Toxoplasma gondii  IgG in a given   specimen determined with assays from different manufacturers can vary due to   differences in assay methods and reagent specificity.           Imaging:  CT c/a/p 9/22/2020   IMPRESSION:   In this patient who is approximately 2 months postop orthotopic heart  transplant:   1. Left supraclavicular subcutaneous soft tissue thickening and fat  stranding is likely an infectious/inflammatory process originating  from the patient's prior left implantable cardiac defibrillator chest  wall pocket. No appreciable associated drainable fluid collection,  though this process is incompletely visualized, extending above the  field-of-view.  2. Simple appearing anterior mediastinal/pericardial fluid collection  with thin rim enhancement but no significant associated inflammatory  fat stranding, likely a postoperative serous collection. There is no  evidence of sternal wound involvement or communication with the  presumed left supraclavicular fossa cellulitis.   3. Small region of isolated skin/subcutaneous soft tissue thickening  in the epigastric region, likely corresponding to the site of a prior  pericardial drain and suggestive of cellulitis. No drainable fluid  collection at this location.   4. Retained vascular access cannula in the right supraclavicular fossa  with an adjacent elongated subclavian artery pseudoaneurysm, contained  by surrounded by soft tissue thickening.  4. Small left and trace right pleural effusions.  5. Cholelithiasis without cholecystitis.      Jillian Hurtado MD  Pager: [phone]  09/23/2020

## 2020-09-23 NOTE — PROGRESS NOTES
[facility], [place]    Cardiology Progress Note- Cards 2        Date of Admission:  9/22/2020     Assessment & Plan: SL   Lucian Henderson Sr. is a 64 year old male admitted on 9/22/2020. He was   admitted via the ED for evaluation of purulent discharge from incisional sites on his left chest wall and abdominal wall. Exam at admission was notable for ~4cm purulent incisional wound w/ perilesional erythema and tenderness; 2-3 cm ovoid epigastric wound, stained violet. CT chest/abdomen/pelvis (09/22/2020) findings are consistent w/ cellulitis of the left supraclavicular and epigastric regions, also  showed fluid collection around the previous balloon pump site where a 8 Afghan chimney graft is implanted as well as fluid collection in the old ICD pocket.    #Abscess, left supraclavicular   #soft tissue infection, epigastric region  - Blood culture pending  -wound culture: moderate growth of probable pseudomonas  - CRP trend: 16.0 (09/22)->42.0 (09/23)  - daily CBC w/ diff  - IV Zosyn 4.5 mg Q6H  - IV Vancomycin 1500 mg daily  - Transplant ID consulted, recs appreciated   > debridement of left subclavian wound   > debridement of abdominal wound   > continue zosyn and vacnomycin  - CV Surgery consulted   > I&D of left subclavian incision site abscess today     #ICM s/p OHT  Most recent biopsy (09/14/2020) was negative.  -Immunsuppression:              - continue Prednisone 10 mg am, 5 mg pm              - continue Tacrolimus 4 mg am, 3 mg pm              - continue Mycophenolate 750 mg BID for now              - tacrolimus trough level am, 12 hrs after last dose.  Prohylaxis             - Valgancyclovir 450 mg every 48 hours             - Bactrim (400-80 mg) 1 daily              - Clotrimazole 10 mg lozenge  -Hydralazine 75mg 8hrly      #Hypomagnesemia              - Keep Mg > 2.0                          >Replacement protocol              - continue magnesium oxide 400 mg  bid    #Normochromic normocytic anemia  possibly anemia of chronic disease. Iron studies: elevated ferritin (447 ng/ml), iron and iron binding cap are wnl. Hold-off on transfusions for now given risk of rejection.    - continue to monitor.      #Bilateral pitting pedal edema  #Hypoalbuminemia              -consider nutrition consult.     #T2DM  - continue pta Humulin 35 units daily  - MDSSI        #h/o constipation  - Miralax 17 g daily  - Senna-docusate 8.6-50 mg bid     #hx of urinary retention  - Continue PTA Tamsulosin 0.4 mg daily            Diet: Carb-restricted diet; NPO at midnight  DVT Prophylaxis: Holding Fondaparinux 7.5 mg daily for now.  GI prophylaxis: Pantoprazole 40 mg daily  Osteoporosis: Calcium carbonate -Vitamin D 600-400mg daily  CAV: Rosuvastatin 10 mg daily  Calderon Catheter: not present  Code Status: Full code  Fluids: None  Lines: PIV      Disposition Plan   Expected discharge: 2 - 3 days, recommended to prior living arrangement once clinical condition has improved.      Entered: Erickson Kelly MD 09/23/2020, 7:49 AM       The patient's care was discussed with the Attending Physician, Dr. Tom De Leon.    Erickson Kelly MD  [facility], [place]  Pager: [phone]  Please see sticky note for cross cover information  ______________________________________________________________________    Interval History   NAEON.   No new complaints this am, scheduled for debridement of left subclavian wound.    Data reviewed today: I reviewed all medications, new labs and imaging results over the last 24 hours. I personally reviewed no imaging or EKG's to review for today.     Physical Exam   Vital Signs: Temp: 98.5  F (36.9  C) Temp src: Oral BP: 125/59 Pulse: 85   Resp: 16 SpO2: 95 % O2 Device: None (Room air)    Weight: 185 lbs 8 oz  Physical Exam  Constitutional:       Appearance: He is not diaphoretic.   HENT:      Mouth/Throat:      Mouth: Mucous membranes are moist.       Pharynx: No oropharyngeal exudate.   Cardiovascular:      Rate and Rhythm: Normal rate and regular rhythm.      Heart sounds: No murmur. No friction rub. No gallop.    Pulmonary:      Effort: Pulmonary effort is normal. No respiratory distress.      Breath sounds: Normal breath sounds. No stridor. No wheezing, rhonchi or rales.   Chest:      Chest wall: Tenderness present.   Musculoskeletal:      Right lower leg: Edema present.      Left lower leg: Edema present.   Skin:     Coloration: Skin is not jaundiced.   Neurological:      General: No focal deficit present.      Mental Status: He is alert and oriented to person, place, and time.           Data   Recent Labs   Lab 09/23/20  0710 09/22/20  2242 09/22/20  1256 09/21/20  1017   WBC 2.4* 2.6* 3.0* 3.1*   HGB 7.9* 6.8* 7.2* 7.2*   MCV 97 100 100 101*    169 187 179   INR 1.16*  --  1.13  --      --  133 133   POTASSIUM 4.8  --  4.2 4.7   CHLORIDE 108  --  104 104   CO2 21  --  22 20   BUN 36*  --  43* 45*   CR 1.26*  --  1.50* 1.57*   ANIONGAP 7  --  7 9   BRYANNA 8.2*  --  8.0* 7.9*   *  --  187* 212*   ALBUMIN  --   --  2.5*  --    PROTTOTAL  --   --  5.5*  --    BILITOTAL  --   --  0.3  --    ALKPHOS  --   --  93  --    ALT  --   --  26  --    AST  --   --  17  --

## 2020-09-23 NOTE — OR NURSING
Paged device nurse to inquire whether or not ICD needs to be interrogated preoperatively. Awaiting call back.

## 2020-09-23 NOTE — PROGRESS NOTES
CLINICAL NUTRITION SERVICES    Reason for Assessment:  Low-sodium (2 g/day) nutrition education, received consult    Diet History:  Per discussion with pt via Pashto-speaking  over the phone, he is aware of low-sodium eating and has been watching his sodium intake for the last four to five years. Pt remarked that he does not add salt to anything he eats. He was not aware of the fluid restriction (2 L/day) which is in place.     Per chart review, pt has received low-sodium nutrition education/reinforcement in the past.      Nutrition Diagnosis:  Food- and nutrition-related knowledge deficit r/t no previous knowledge of fluid restriction AEB pt report of no previous formal nutrition education regarding fluid restriction in the past.     Nutrition Prescription/Recs:  Low-sodium diet per, when appropriate. Continue fluid restriction as per team.     Interventions:  Nutrition Education (pt via ): Offered nutrition education regarding low-sodium diet. Pt declined need as he already limits his sodium intake. Explained fluid restriction and rationale. Pt verbalized understanding and has not further questions at this time.     Goals:    Pt will verbalize at least five high sodium foods and the importance of avoiding added salt to foods for cooking or seasoning foods.     Follow-up:   Patient to ask any further nutrition-related questions before discharge. In addition, pt may request outpatient RD appointment.     Christianne Rendon, MS, RD, LD, [facility]   6C Pgr: [phone]

## 2020-09-23 NOTE — ED NOTES
Emergency Department Patient Sign-out       Brief HPI:  This is a 64 year old male signed out to me by Dr. Cowart .  See initial ED Provider note for details of the presentation.            Significant Events prior to my assuming care: labs cx and started abx.      Exam:   Patient Vitals for the past 24 hrs:   BP Temp Temp src Pulse Resp SpO2   09/22/20 1950 129/76 -- -- 93 -- 97 %   09/22/20 1940 -- -- -- 94 -- 95 %   09/22/20 1930 130/67 -- -- 93 -- 98 %   09/22/20 1920 134/71 -- -- 93 -- 98 %   09/22/20 1910 -- -- -- 92 -- 97 %   09/22/20 1900 139/69 -- -- 92 -- 98 %   09/22/20 1850 127/73 -- -- 92 -- 98 %   09/22/20 1840 -- -- -- 92 -- 98 %   09/22/20 1830 124/71 -- -- 93 -- 98 %   09/22/20 1820 -- -- -- 97 -- 98 %   09/22/20 1815 126/71 -- -- 97 -- 97 %   09/22/20 1810 -- -- -- 97 -- 97 %   09/22/20 1800 137/76 -- -- 97 -- 98 %   09/22/20 1745 (!) 140/77 -- -- 98 -- 97 %   09/22/20 1730 134/73 -- -- 99 -- 98 %   09/22/20 1715 (!) 140/76 -- -- 98 -- 98 %   09/22/20 1700 134/81 -- -- 98 -- 98 %   09/22/20 1645 133/72 -- -- 100 -- 98 %   09/22/20 1630 (!) 142/69 -- -- 96 -- 98 %   09/22/20 1615 -- -- -- 96 -- 97 %   09/22/20 1600 -- -- -- 95 -- 98 %   09/22/20 1545 -- -- -- 96 -- 98 %   09/22/20 1530 -- -- -- 97 -- 98 %   09/22/20 1515 -- -- -- 97 -- 98 %   09/22/20 1500 -- -- -- 98 -- 98 %   09/22/20 1445 -- -- -- 99 -- --   09/22/20 1400 126/71 -- -- 99 -- 98 %   09/22/20 1345 138/69 -- -- 98 -- 97 %   09/22/20 1330 (!) 145/69 -- -- 100 -- 98 %   09/22/20 1315 129/66 -- -- 100 -- 99 %   09/22/20 1300 (!) 147/71 -- -- 102 -- 98 %   09/22/20 1235 (!) 146/71 99.5  F (37.5  C) Oral 106 16 98 %   09/22/20 1234 -- 99.9  F (37.7  C) Oral 94 20 96 %           ED RESULTS:   Results for orders placed or performed during the hospital encounter of 09/22/20 (from the past 24 hour(s))   Comprehensive metabolic panel     Status: Abnormal    Collection Time: 09/22/20 12:56 PM   Result Value Ref Range    Sodium 133 133 -  144 mmol/L    Potassium 4.2 3.4 - 5.3 mmol/L    Chloride 104 94 - 109 mmol/L    Carbon Dioxide 22 20 - 32 mmol/L    Anion Gap 7 3 - 14 mmol/L    Glucose 187 (H) 70 - 99 mg/dL    Urea Nitrogen 43 (H) 7 - 30 mg/dL    Creatinine 1.50 (H) 0.66 - 1.25 mg/dL    GFR Estimate 48 (L) >60 mL/min/[1.73_m2]    GFR Estimate If Black 55 (L) >60 mL/min/[1.73_m2]    Calcium 8.0 (L) 8.5 - 10.1 mg/dL    Bilirubin Total 0.3 0.2 - 1.3 mg/dL    Albumin 2.5 (L) 3.4 - 5.0 g/dL    Protein Total 5.5 (L) 6.8 - 8.8 g/dL    Alkaline Phosphatase 93 40 - 150 U/L    ALT 26 0 - 70 U/L    AST 17 0 - 45 U/L   CBC with platelets differential     Status: Abnormal    Collection Time: 09/22/20 12:56 PM   Result Value Ref Range    WBC 3.0 (L) 4.0 - 11.0 10e9/L    RBC Count 2.34 (L) 4.4 - 5.9 10e12/L    Hemoglobin 7.2 (L) 13.3 - 17.7 g/dL    Hematocrit 23.5 (L) 40.0 - 53.0 %     78 - 100 fl    MCH 30.8 26.5 - 33.0 pg    MCHC 30.6 (L) 31.5 - 36.5 g/dL    RDW 20.0 (H) 10.0 - 15.0 %    Platelet Count 187 150 - 450 10e9/L    Diff Method Manual Differential     % Neutrophils 92.9 %    % Lymphocytes 0.9 %    % Monocytes 5.3 %    % Eosinophils 0.0 %    % Basophils 0.0 %    % Metamyelocytes 0.9 %    Absolute Neutrophil 2.8 1.6 - 8.3 10e9/L    Absolute Lymphocytes 0.0 (L) 0.8 - 5.3 10e9/L    Absolute Monocytes 0.2 0.0 - 1.3 10e9/L    Absolute Eosinophils 0.0 0.0 - 0.7 10e9/L    Absolute Basophils 0.0 0.0 - 0.2 10e9/L    Absolute Metamyelocytes 0.0 0 10e9/L    Anisocytosis Slight     Poikilocytosis Moderate     Polychromasia Slight     Teardrop Cells Slight     Dohle Bodies Present     Toxic Granulation Present     Platelet Estimate Confirming automated cell count    Lactate for Sepsis Protocol     Status: Abnormal    Collection Time: 09/22/20 12:56 PM   Result Value Ref Range    Lactate for Sepsis Protocol 2.5 (H) 0.7 - 2.0 mmol/L   CRP inflammation     Status: Abnormal    Collection Time: 09/22/20 12:56 PM   Result Value Ref Range    CRP Inflammation 16.0  (H) 0.0 - 8.0 mg/L   INR     Status: None    Collection Time: 09/22/20 12:56 PM   Result Value Ref Range    INR 1.13 0.86 - 1.14   Partial thromboplastin time     Status: None    Collection Time: 09/22/20 12:56 PM   Result Value Ref Range    PTT 31 22 - 37 sec   Blood culture     Status: None (Preliminary result)    Collection Time: 09/22/20 12:56 PM    Specimen: Blood    Right Arm   Result Value Ref Range    Specimen Description Blood Right Arm     Culture Micro PENDING    Nt probnp inpatient     Status: Abnormal    Collection Time: 09/22/20 12:56 PM   Result Value Ref Range    N-Terminal Pro BNP Inpatient 8,792 (H) 0 - 900 pg/mL   Magnesium     Status: None    Collection Time: 09/22/20 12:56 PM   Result Value Ref Range    Magnesium 1.9 1.6 - 2.3 mg/dL   Blood culture     Status: None (Preliminary result)    Collection Time: 09/22/20 12:58 PM    Specimen: Arm; Blood    Left Arm   Result Value Ref Range    Specimen Description Blood Left Arm     Culture Micro PENDING    Creatinine POCT     Status: Abnormal    Collection Time: 09/22/20  1:13 PM   Result Value Ref Range    Creatinine 1.5 (H) 0.66 - 1.25 mg/dL    GFR Estimate 47 (L) >60 mL/min/[1.73_m2]    GFR Estimate If Black 57 (L) >60 mL/min/[1.73_m2]   Wound Culture Aerobic Bacterial     Status: None (Preliminary result)    Collection Time: 09/22/20  1:28 PM    Specimen: Wound drainage; Subclavian    Right~Wound   Result Value Ref Range    Specimen Description Subclavian Right Wound     Special Requests Specimen collected in eSwab transport (white cap)     Culture Micro PENDING    Gram stain     Status: None    Collection Time: 09/22/20  1:28 PM    Specimen: Subclavian    Right~Wound   Result Value Ref Range    Specimen Description Subclavian Right Wound     Special Requests Specimen collected in eSwab transport (white cap)     Gram Stain No organisms seen     Gram Stain Moderate  WBC'S seen  predominantly PMN's      EKG 12 lead     Status: None    Collection  Time: 09/22/20  1:31 PM   Result Value Ref Range    Interpretation ECG Click View Image link to view waveform and result    CT Chest/Abdomen/Pelvis w Contrast     Status: None    Collection Time: 09/22/20  2:36 PM    Narrative    EXAMINATION: CT CHEST/ABDOMEN/PELVIS W CONTRAST, 9/22/2020 2:36 PM    TECHNIQUE:  Helical CT images from the thoracic inlet through the  symphysis pubis were obtained  with contrast.     Contrast dose: iopamidol (ISOVUE-370) solution 104 mL    COMPARISON: 7/9/2019 CT    HISTORY: recent heart transplant(7/19/2020), purulent drainage and  swelling over left subclavian wound and abdominal wound, concern for  abscess, deeper infection, etc    FINDINGS:    Chest: Postsurgical changes of a median sternotomy orthotopic heart  transplant and removal of left chest wall ICD.     Overlying the left supraclavicular fossa and left pectoralis major  musculature in what was likely the prior left chest wall implantable  cardiac defibrillator pocket, there is  soft tissue edema/thickening  that appears to track superiorly though is incompletely visualized. No  associated drainable fluid collection. Additionally, within the  retrosternal space/anterior mediastinum tracking inferiorly along the  pericardium and into the pericardial space anterior to the heart,  there is a simple appearing, low attenuating, thin rim enhancing fluid  collection that does not appear to involve the median sternotomy or  invade pericardial fat planes. No surrounding inflammatory fat  stranding and/or associated gas. Skin thickening and focal  subcutaneous soft tissue thickening/fluid in the midline epigastric  region corresponding to the site of a previous pericardial drain.    The tracheobronchial tree is patent. There are multiple subcentimeter  calcified granulomas through the bilateral lung fields. Small  left-sided pleural effusion. Trace right pleural effusion. No  pneumothorax. Bilateral gynecomastia. Within the right  supraclavicular  fossa there is an elongated contrast-enhancing structure extending  superiorly from and continuous with the lumen of the lateral right  subclavian artery with surrounding soft tissue thickening and an  adjacent retained linear vascular access cannula, which is located  along the superficial aspect of the right pectoralis minor  musculature. There is no mediastinal or axillary adenopathy.    Abdomen and pelvis: There are no focal hepatic masses. Mild degree of  periportal edema. Portal vasculature is patent. Cholelithiasis without  cholecystitis. No intra or extra hepatic biliary dilatation. The  pancreas, spleen and adrenal glands appear normal. There is bilateral,  mild renal parenchymal atrophy. No nephrolithiasis. Mild bilateral  hydroureter. No evidence of ureteral obstruction. The small and large  bowel are of normal caliber. The appendix is normal-appearing. There  is no pelvic, retroperitoneal or inguinal adenopathy. No free  abdominal or pelvic fluid.    Bones and soft tissues: Degenerative changes most notable in the mid  thoracic spine. No suspicious osseous or soft tissue lesions.      Impression    IMPRESSION:     In this patient who is approximately 2 months postop orthotopic heart  transplant:     1. Left supraclavicular subcutaneous soft tissue thickening and fat  stranding is likely an infectious/inflammatory process originating  from the patient's prior left implantable cardiac defibrillator chest  wall pocket. No appreciable associated drainable fluid collection,  though this process is incompletely visualized, extending above the  field-of-view.    2. Simple appearing anterior mediastinal/pericardial fluid collection  with thin rim enhancement but no significant associated inflammatory  fat stranding, likely a postoperative serous collection. There is no  evidence of sternal wound involvement or communication with the  presumed left supraclavicular fossa cellulitis.     3. Small  region of isolated skin/subcutaneous soft tissue thickening  in the epigastric region, likely corresponding to the site of a prior  pericardial drain and suggestive of cellulitis. No drainable fluid  collection at this location.     4. Retained vascular access cannula in the right supraclavicular fossa  with an adjacent elongated subclavian artery pseudoaneurysm, contained  by surrounded by soft tissue thickening.    4. Small left and trace right pleural effusions.    5. Cholelithiasis without cholecystitis.    I have personally reviewed the examination and initial interpretation  and I agree with the findings.    HAM WALLS, DO   UA with Microscopic     Status: Abnormal    Collection Time: 09/22/20  2:48 PM   Result Value Ref Range    Color Urine Yellow     Appearance Urine Clear     Glucose Urine 150 (A) NEG^Negative mg/dL    Bilirubin Urine Negative NEG^Negative    Ketones Urine Negative NEG^Negative mg/dL    Specific Gravity Urine 1.017 1.003 - 1.035    Blood Urine Negative NEG^Negative    pH Urine 5.0 5.0 - 7.0 pH    Protein Albumin Urine 10 (A) NEG^Negative mg/dL    Urobilinogen mg/dL Normal 0.0 - 2.0 mg/dL    Nitrite Urine Negative NEG^Negative    Leukocyte Esterase Urine Negative NEG^Negative    Source Midstream Urine     WBC Urine 1 0 - 5 /HPF    RBC Urine <1 0 - 2 /HPF    Mucous Urine Present (A) NEG^Negative /LPF   Urine Culture     Status: None (Preliminary result)    Collection Time: 09/22/20  2:48 PM    Specimen: Urine Midstream; Midstream Urine   Result Value Ref Range    Specimen Description Midstream Urine     Special Requests Specimen received in preservative     Culture Micro PENDING    Asymptomatic COVID-19 Virus (Coronavirus) by PCR     Status: None    Collection Time: 09/22/20  2:49 PM    Specimen: Nasopharyngeal   Result Value Ref Range    COVID-19 Virus PCR to [facility] - Source Swab     COVID-19 Virus PCR to [facility] - Result       Test received-See reflex to IDDL test SARS CoV2 (COVID-19)  Virus RT-PCR   SARS-CoV-2 COVID-19 Virus (Coronavirus) RT-PCR Nasopharyngeal     Status: None    Collection Time: 09/22/20  2:49 PM    Specimen: Nasopharyngeal   Result Value Ref Range    SARS-CoV-2 Virus Specimen Source Swab     SARS-CoV-2 PCR Result NEGATIVE     SARS-CoV-2 PCR Comment       Testing was performed using the Xpert Xpress SARS-CoV-2 Assay on the Cepheid Gene-Xpert   Instrument Systems. Additional information about this Emergency Use Authorization (EUA)   assay can be found via the Lab Guide.     Glucose by meter     Status: Abnormal    Collection Time: 09/22/20  8:12 PM   Result Value Ref Range    Glucose 310 (H) 70 - 99 mg/dL   Glucose by meter     Status: Abnormal    Collection Time: 09/22/20  9:38 PM   Result Value Ref Range    Glucose 337 (H) 70 - 99 mg/dL       ED MEDICATIONS:   Medications   vancomycin 1500 mg in 0.9% NaCl 250 ml intermittent infusion 1,500 mg (1,500 mg Intravenous Given 9/22/20 1403)   acetaminophen (TYLENOL) tablet 975 mg (has no administration in time range)   calcium carbonate 600 mg-vitamin D 400 units (CALTRATE) per tablet 1 tablet (1 tablet Oral Given 9/22/20 2015)   clotrimazole (MYCELEX) lozenge 1 lozenge (1 lozenge Buccal Given 9/22/20 2015)   fondaparinux ANTICOAGULANT (ARIXTRA) injection 7.5 mg ( Subcutaneous Automatically Held 9/25/20 1905)   hydrALAZINE (APRESOLINE) tablet 75 mg (75 mg Oral Given 9/22/20 2014)   magnesium oxide (MAG-OX) tablet 400 mg (400 mg Oral Given 9/22/20 2015)   pantoprazole (PROTONIX) EC tablet 40 mg (has no administration in time range)   polyethylene glycol (MIRALAX) Packet 17 g (has no administration in time range)   rosuvastatin (CRESTOR) tablet 10 mg (has no administration in time range)   senna-docusate (SENOKOT-S/PERICOLACE) 8.6-50 MG per tablet 1 tablet (has no administration in time range)   tamsulosin (FLOMAX) capsule 0.4 mg (has no administration in time range)   valGANciclovir (VALCYTE) tablet 450 mg (has no administration in  time range)   insulin isophane human (HumuLIN N PEN) injection 35 Units (has no administration in time range)   HOLD nitroGLYcerin IF (has no administration in time range)   Patient is already receiving anticoagulation with heparin, enoxaparin (LOVENOX), warfarin (COUMADIN)  or other anticoagulant medication (has no administration in time range)   Reason ACE/ARB/ARNI order not selected (has no administration in time range)   Continuing beta blocker from home medication list OR beta blocker order already placed during this visit (has no administration in time range)   potassium chloride ER (KLOR-CON M) CR tablet 20-40 mEq (has no administration in time range)   potassium chloride (KLOR-CON) Packet 20-40 mEq (has no administration in time range)   potassium chloride 10 mEq in 100 mL sterile water intermittent infusion (premix) (has no administration in time range)   potassium chloride 10 mEq in 100 mL intermittent infusion with 10 mg lidocaine (has no administration in time range)   potassium chloride 20 mEq in 50 mL intermittent infusion (has no administration in time range)   magnesium sulfate 2 g in water intermittent infusion (has no administration in time range)   magnesium sulfate 4 g in 100 mL sterile water (premade) (has no administration in time range)   piperacillin-tazobactam (ZOSYN) 4.5 g vial to attach to  mL bag (4.5 g Intravenous New Bag 9/22/20 2232)   glucose gel 15-30 g (has no administration in time range)     Or   dextrose 50 % injection 25-50 mL (has no administration in time range)     Or   glucagon injection 1 mg (has no administration in time range)   insulin aspart (NovoLOG) injection (RAPID ACTING) (4 Units Subcutaneous Given 9/22/20 2139)   mycophenolate (GENERIC EQUIVALENT) capsule 750 mg (750 mg Oral Given 9/22/20 2013)   tacrolimus (GENERIC EQUIVALENT) capsule 4 mg (has no administration in time range)   tacrolimus (GENERIC EQUIVALENT) capsule 3 mg (3 mg Oral Given 9/22/20 2014)    predniSONE (DELTASONE) tablet 10 mg (has no administration in time range)   predniSONE (DELTASONE) tablet 5 mg (5 mg Oral Given 9/22/20 2015)   sulfamethoxazole-trimethoprim (BACTRIM) 400-80 MG per tablet 1 tablet (has no administration in time range)   0.9% sodium chloride BOLUS (0 mLs Intravenous Stopped 9/22/20 1510)   piperacillin-tazobactam (ZOSYN) 3.375 g vial to attach to  mL bag (0 g Intravenous Stopped 9/22/20 1812)   iopamidol (ISOVUE-370) solution 104 mL (104 mLs Intravenous Given 9/22/20 1410)   sodium chloride (PF) 0.9% PF flush 76 mL (76 mLs Intravenous Given 9/22/20 1410)         Impression:    ICD-10-CM    1. Postoperative infection, unspecified type, initial encounter  T81.40XA Blood culture     UA with Microscopic     Urine Culture     Blood culture     Wound Culture Aerobic Bacterial     Gram stain     Asymptomatic COVID-19 Virus (Coronavirus) by PCR     Nt probnp inpatient     SARS-CoV-2 COVID-19 Virus (Coronavirus) RT-PCR     SARS-CoV-2 COVID-19 Virus (Coronavirus) RT-PCR Nasopharyngeal     Magnesium     Magnesium     Glucose by meter     Glucose by meter     Glucose by meter     Glucose by meter     CANCELED: Magnesium   2. Sepsis without acute organ dysfunction, due to unspecified organism (H)  A41.9        Plan:    Pending studies include CT chest abd-cellulitis and line site infection but no abscess, admitted as planned.        Miriam Huffman MD, Miriam Rios MD  09/22/20 2922

## 2020-09-23 NOTE — PLAN OF CARE
D: Admitted 9/23 w/ infection at old incisional sites from heart transplant surgery 7/19. Hx of ICM & DM2.     I/A: A/Ox4. VSS on RA. Tmax 99.1. SR. Denies pain. Hgb 6.8, 1 unit pRBCs transfused. Hgb recheck this am. Int IV abx started. L. upper chest incisional site not approximated, purulent drainage. Open to air at pt's request for comfort. 3 old CT site scabbed, one possibly infected. NPO at midnight for possible surgical intervention. 3+ pitting edema in BLE. RLE extensive ecchymosis. Voiding adequately. BGs 200-300s, checks Q4H w/ sliding scale. SBA. Admission profile & 2 RN skin check complete.     P: Continue monitoring & notify Cards 2 of changes/concerns.

## 2020-09-23 NOTE — OR NURSING
Writer spoke with Dr. Scales regarding ICD. Dr. Scales confirmed that pt no longer has ICD in place.

## 2020-09-23 NOTE — PROGRESS NOTES
Vascular surgery brief note    Assessment: 64-year-old male with a history of recent open heart transplant on 7/19/2020 presented yesterday with left supraclavicular subcutaneous abscess which will be debrided by cardiothoracic surgery today.      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    The patient currently has a small 2 cm well-healed incision in the right supraclavicular fossa that has a palpable mass underlying.  There are no signs of local infection with no erythema cellulitis or drainage.    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 It will need to be closely monitored and followed for signs and symptoms of infection, impending rupture or ischemia.     Ara Perez MD   Vascular Surgery Fellow  Pager [phone]

## 2020-09-24 ENCOUNTER — APPOINTMENT (OUTPATIENT)
Dept: OCCUPATIONAL THERAPY | Facility: CLINIC | Age: 65
DRG: 857 | End: 2020-09-24
Attending: INTERNAL MEDICINE
Payer: COMMERCIAL

## 2020-09-24 ENCOUNTER — APPOINTMENT (OUTPATIENT)
Dept: ULTRASOUND IMAGING | Facility: CLINIC | Age: 65
DRG: 857 | End: 2020-09-24
Payer: COMMERCIAL

## 2020-09-24 LAB
ACANTHOCYTES BLD QL SMEAR: SLIGHT
ANION GAP SERPL CALCULATED.3IONS-SCNC: 8 MMOL/L (ref 3–14)
ANISOCYTOSIS BLD QL SMEAR: ABNORMAL
BASOPHILS # BLD AUTO: 0 10E9/L (ref 0–0.2)
BASOPHILS NFR BLD AUTO: 0 %
BUN SERPL-MCNC: 37 MG/DL (ref 7–30)
CALCIUM SERPL-MCNC: 8.1 MG/DL (ref 8.5–10.1)
CHLORIDE SERPL-SCNC: 108 MMOL/L (ref 94–109)
CO2 SERPL-SCNC: 21 MMOL/L (ref 20–32)
CREAT SERPL-MCNC: 1.49 MG/DL (ref 0.66–1.25)
DIFFERENTIAL METHOD BLD: ABNORMAL
EOSINOPHIL # BLD AUTO: 0 10E9/L (ref 0–0.7)
EOSINOPHIL NFR BLD AUTO: 0 %
ERYTHROCYTE [DISTWIDTH] IN BLOOD BY AUTOMATED COUNT: 19.8 % (ref 10–15)
GFR SERPL CREATININE-BSD FRML MDRD: 48 ML/MIN/{1.73_M2}
GLUCOSE BLDC GLUCOMTR-MCNC: 126 MG/DL (ref 70–99)
GLUCOSE BLDC GLUCOMTR-MCNC: 154 MG/DL (ref 70–99)
GLUCOSE BLDC GLUCOMTR-MCNC: 158 MG/DL (ref 70–99)
GLUCOSE BLDC GLUCOMTR-MCNC: 185 MG/DL (ref 70–99)
GLUCOSE BLDC GLUCOMTR-MCNC: 195 MG/DL (ref 70–99)
GLUCOSE BLDC GLUCOMTR-MCNC: 236 MG/DL (ref 70–99)
GLUCOSE BLDC GLUCOMTR-MCNC: 247 MG/DL (ref 70–99)
GLUCOSE SERPL-MCNC: 141 MG/DL (ref 70–99)
HCT VFR BLD AUTO: 24.6 % (ref 40–53)
HGB BLD-MCNC: 7.4 G/DL (ref 13.3–17.7)
INR PPP: 1.18 (ref 0.86–1.14)
LYMPHOCYTES # BLD AUTO: 0.2 10E9/L (ref 0.8–5.3)
LYMPHOCYTES NFR BLD AUTO: 9.6 %
MAGNESIUM SERPL-MCNC: 2 MG/DL (ref 1.6–2.3)
MCH RBC QN AUTO: 30 PG (ref 26.5–33)
MCHC RBC AUTO-ENTMCNC: 30.1 G/DL (ref 31.5–36.5)
MCV RBC AUTO: 100 FL (ref 78–100)
MICROCYTES BLD QL SMEAR: PRESENT
MONOCYTES # BLD AUTO: 0.1 10E9/L (ref 0–1.3)
MONOCYTES NFR BLD AUTO: 7 %
NEUTROPHILS # BLD AUTO: 1.5 10E9/L (ref 1.6–8.3)
NEUTROPHILS NFR BLD AUTO: 83.4 %
PLATELET # BLD AUTO: 166 10E9/L (ref 150–450)
PLATELET # BLD EST: ABNORMAL 10*3/UL
POIKILOCYTOSIS BLD QL SMEAR: ABNORMAL
POTASSIUM SERPL-SCNC: 4.7 MMOL/L (ref 3.4–5.3)
RBC # BLD AUTO: 2.47 10E12/L (ref 4.4–5.9)
SODIUM SERPL-SCNC: 137 MMOL/L (ref 133–144)
TACROLIMUS BLD-MCNC: 8.4 UG/L (ref 5–15)
TME LAST DOSE: NORMAL H
WBC # BLD AUTO: 1.8 10E9/L (ref 4–11)

## 2020-09-24 PROCEDURE — 83735 ASSAY OF MAGNESIUM: CPT | Performed by: STUDENT IN AN ORGANIZED HEALTH CARE EDUCATION/TRAINING PROGRAM

## 2020-09-24 PROCEDURE — 25800030 ZZH RX IP 258 OP 636: Performed by: INTERNAL MEDICINE

## 2020-09-24 PROCEDURE — 25000132 ZZH RX MED GY IP 250 OP 250 PS 637: Performed by: STUDENT IN AN ORGANIZED HEALTH CARE EDUCATION/TRAINING PROGRAM

## 2020-09-24 PROCEDURE — 99232 SBSQ HOSP IP/OBS MODERATE 35: CPT | Mod: GC | Performed by: INTERNAL MEDICINE

## 2020-09-24 PROCEDURE — 93971 EXTREMITY STUDY: CPT | Mod: RT

## 2020-09-24 PROCEDURE — 25000128 H RX IP 250 OP 636: Performed by: STUDENT IN AN ORGANIZED HEALTH CARE EDUCATION/TRAINING PROGRAM

## 2020-09-24 PROCEDURE — 25000132 ZZH RX MED GY IP 250 OP 250 PS 637: Performed by: INTERNAL MEDICINE

## 2020-09-24 PROCEDURE — 25000131 ZZH RX MED GY IP 250 OP 636 PS 637: Performed by: STUDENT IN AN ORGANIZED HEALTH CARE EDUCATION/TRAINING PROGRAM

## 2020-09-24 PROCEDURE — 36415 COLL VENOUS BLD VENIPUNCTURE: CPT | Performed by: STUDENT IN AN ORGANIZED HEALTH CARE EDUCATION/TRAINING PROGRAM

## 2020-09-24 PROCEDURE — 97530 THERAPEUTIC ACTIVITIES: CPT | Mod: GO

## 2020-09-24 PROCEDURE — 80048 BASIC METABOLIC PNL TOTAL CA: CPT | Performed by: STUDENT IN AN ORGANIZED HEALTH CARE EDUCATION/TRAINING PROGRAM

## 2020-09-24 PROCEDURE — 21400000 ZZH R&B CCU UMMC

## 2020-09-24 PROCEDURE — 85025 COMPLETE CBC W/AUTO DIFF WBC: CPT | Performed by: STUDENT IN AN ORGANIZED HEALTH CARE EDUCATION/TRAINING PROGRAM

## 2020-09-24 PROCEDURE — 25000128 H RX IP 250 OP 636: Performed by: INTERNAL MEDICINE

## 2020-09-24 PROCEDURE — 80197 ASSAY OF TACROLIMUS: CPT | Performed by: STUDENT IN AN ORGANIZED HEALTH CARE EDUCATION/TRAINING PROGRAM

## 2020-09-24 PROCEDURE — 00000146 ZZHCL STATISTIC GLUCOSE BY METER IP

## 2020-09-24 PROCEDURE — 85610 PROTHROMBIN TIME: CPT | Performed by: STUDENT IN AN ORGANIZED HEALTH CARE EDUCATION/TRAINING PROGRAM

## 2020-09-24 PROCEDURE — 97535 SELF CARE MNGMENT TRAINING: CPT | Mod: GO

## 2020-09-24 PROCEDURE — 97165 OT EVAL LOW COMPLEX 30 MIN: CPT | Mod: GO

## 2020-09-24 RX ORDER — ASPIRIN 81 MG/1
81 TABLET, CHEWABLE ORAL DAILY
Status: DISCONTINUED | OUTPATIENT
Start: 2020-09-24 | End: 2020-10-05 | Stop reason: HOSPADM

## 2020-09-24 RX ORDER — VALGANCICLOVIR 450 MG/1
450 TABLET, FILM COATED ORAL DAILY
Status: DISCONTINUED | OUTPATIENT
Start: 2020-09-24 | End: 2020-10-02

## 2020-09-24 RX ORDER — LIDOCAINE 40 MG/G
CREAM TOPICAL
Status: CANCELLED | OUTPATIENT
Start: 2020-09-24

## 2020-09-24 RX ADMIN — PREDNISONE 5 MG: 5 TABLET ORAL at 19:48

## 2020-09-24 RX ADMIN — HYDRALAZINE HYDROCHLORIDE 75 MG: 25 TABLET ORAL at 19:48

## 2020-09-24 RX ADMIN — PREDNISONE 10 MG: 10 TABLET ORAL at 08:22

## 2020-09-24 RX ADMIN — CLOTRIMAZOLE 1 LOZENGE: 10 LOZENGE ORAL at 15:47

## 2020-09-24 RX ADMIN — ASPIRIN 81 MG: 81 TABLET, COATED ORAL at 14:31

## 2020-09-24 RX ADMIN — Medication 1 TABLET: at 08:21

## 2020-09-24 RX ADMIN — PIPERACILLIN SODIUM AND TAZOBACTAM SODIUM 4.5 G: 4; .5 INJECTION, POWDER, LYOPHILIZED, FOR SOLUTION INTRAVENOUS at 11:41

## 2020-09-24 RX ADMIN — TACROLIMUS 4 MG: 1 CAPSULE ORAL at 08:21

## 2020-09-24 RX ADMIN — VALGANCICLOVIR 450 MG: 450 TABLET, FILM COATED ORAL at 08:21

## 2020-09-24 RX ADMIN — INSULIN ASPART 2 UNITS: 100 INJECTION, SOLUTION INTRAVENOUS; SUBCUTANEOUS at 19:50

## 2020-09-24 RX ADMIN — MAGNESIUM OXIDE 400 MG: 400 TABLET ORAL at 19:48

## 2020-09-24 RX ADMIN — VANCOMYCIN HYDROCHLORIDE 1500 MG: 10 INJECTION, POWDER, LYOPHILIZED, FOR SOLUTION INTRAVENOUS at 14:24

## 2020-09-24 RX ADMIN — HYDRALAZINE HYDROCHLORIDE 75 MG: 25 TABLET ORAL at 03:56

## 2020-09-24 RX ADMIN — CLOTRIMAZOLE 1 LOZENGE: 10 LOZENGE ORAL at 11:37

## 2020-09-24 RX ADMIN — INSULIN ASPART 3 UNITS: 100 INJECTION, SOLUTION INTRAVENOUS; SUBCUTANEOUS at 16:27

## 2020-09-24 RX ADMIN — Medication 1 TABLET: at 18:13

## 2020-09-24 RX ADMIN — ACETAMINOPHEN 975 MG: 325 TABLET, FILM COATED ORAL at 19:48

## 2020-09-24 RX ADMIN — HYDRALAZINE HYDROCHLORIDE 75 MG: 25 TABLET ORAL at 11:37

## 2020-09-24 RX ADMIN — INSULIN ASPART 2 UNITS: 100 INJECTION, SOLUTION INTRAVENOUS; SUBCUTANEOUS at 12:50

## 2020-09-24 RX ADMIN — PIPERACILLIN SODIUM AND TAZOBACTAM SODIUM 4.5 G: 4; .5 INJECTION, POWDER, LYOPHILIZED, FOR SOLUTION INTRAVENOUS at 22:03

## 2020-09-24 RX ADMIN — MAGNESIUM SULFATE 2 G: 2 INJECTION INTRAVENOUS at 18:13

## 2020-09-24 RX ADMIN — TACROLIMUS 3.5 MG: 1 CAPSULE ORAL at 18:13

## 2020-09-24 RX ADMIN — TAMSULOSIN HYDROCHLORIDE 0.4 MG: 0.4 CAPSULE ORAL at 08:21

## 2020-09-24 RX ADMIN — FONDAPARINUX SODIUM 7.5 MG: 7.5 INJECTION, SOLUTION SUBCUTANEOUS at 19:48

## 2020-09-24 RX ADMIN — PANTOPRAZOLE SODIUM 40 MG: 40 TABLET, DELAYED RELEASE ORAL at 08:21

## 2020-09-24 RX ADMIN — SULFAMETHOXAZOLE AND TRIMETHOPRIM 1 TABLET: 400; 80 TABLET ORAL at 08:21

## 2020-09-24 RX ADMIN — INSULIN ASPART 1 UNITS: 100 INJECTION, SOLUTION INTRAVENOUS; SUBCUTANEOUS at 23:51

## 2020-09-24 RX ADMIN — INSULIN HUMAN 35 UNITS: 100 INJECTION, SUSPENSION SUBCUTANEOUS at 08:36

## 2020-09-24 RX ADMIN — ACETAMINOPHEN 975 MG: 325 TABLET, FILM COATED ORAL at 04:01

## 2020-09-24 RX ADMIN — PIPERACILLIN SODIUM AND TAZOBACTAM SODIUM 4.5 G: 4; .5 INJECTION, POWDER, LYOPHILIZED, FOR SOLUTION INTRAVENOUS at 16:28

## 2020-09-24 RX ADMIN — MAGNESIUM OXIDE 400 MG: 400 TABLET ORAL at 08:21

## 2020-09-24 RX ADMIN — ROSUVASTATIN CALCIUM 10 MG: 10 TABLET, FILM COATED ORAL at 08:21

## 2020-09-24 RX ADMIN — ACETAMINOPHEN 975 MG: 325 TABLET, FILM COATED ORAL at 11:38

## 2020-09-24 RX ADMIN — PIPERACILLIN SODIUM AND TAZOBACTAM SODIUM 4.5 G: 4; .5 INJECTION, POWDER, LYOPHILIZED, FOR SOLUTION INTRAVENOUS at 03:54

## 2020-09-24 RX ADMIN — CLOTRIMAZOLE 1 LOZENGE: 10 LOZENGE ORAL at 08:22

## 2020-09-24 RX ADMIN — OXYCODONE HYDROCHLORIDE 5 MG: 5 TABLET ORAL at 13:09

## 2020-09-24 RX ADMIN — CLOTRIMAZOLE 1 LOZENGE: 10 LOZENGE ORAL at 19:48

## 2020-09-24 ASSESSMENT — ACTIVITIES OF DAILY LIVING (ADL)
ADLS_ACUITY_SCORE: 12

## 2020-09-24 NOTE — PROGRESS NOTES
Pt now back from I&D procedure of L subclavian and mid abdominal incisions. Per PACU RN report, pt only got local anesthesia and currently good to come back to unit. Pt alert and oriented x4. Vitals taken and WNL. No capnography monitoring needed per on call CV provider. L subclavian and mid abdominal incision dressings CDI.     Temp: 97.7  F (36.5  C) Temp src: Oral BP: 116/64 Pulse: 100   Resp: 16 SpO2: 100 % O2 Device: None (Room air)

## 2020-09-24 NOTE — PLAN OF CARE
OT/6C: OT/Edema eval completed. Patient presenting with 2-3+ pitting edema in bilateral LE's. Reports to be consistent over past several weeks and has been managing at home via wife wrapping with GCB's. Given estimated short LOS (notes indicate ~2 days, patient hoping to discharge today or tomorrow), will HOLD initiation of compression d/t concern for inability to monitor skin integrity/response to compression with short LOS. 1-2+ pitting edema around R elbow; though reports to be improving with conservative measures. Recommend continued elevation and muscle pump activation at this time.     Please notify edema/therapy if discharge timeline changes considerably and can re-initiate wraps. Would benefit from OP edema referral, however patient reluctant at this time d/t cost and time. Prefers to have wife continue donning wraps at home.    Discharge Planner OT   Patient plan for discharge: Home with assist from wife prn  Current status: OT evaluation completed, treatment initiated. Pt ambulated 300 ft with CGA and IV pole for HH assist, mild instability likely due to edema in BLE though no overt LOB. Educated pt in compensatory dressing strategies within precautions after surgery. Requested team clarify any specific ROM precautions in L shoulder s/p I&D. Educated to minimize movement at this time.   Barriers to return to prior living situation: Medical status, post-op precautions  Recommendations for discharge: Home with assist prn, resumption of cardiac rehab  Rationale for recommendations: Pt doing well post-op and demonstrating appropriate level of independence with functional mobility and ADL completion for anticipated discharge home with assist prn from spouse once medically stable.       Entered by: Jyotsna Perdomo 09/24/2020 11:21 AM

## 2020-09-24 NOTE — PROGRESS NOTES
[facility], [place]    Cardiology Progress Note- Cards 2        Date of Admission:  9/22/2020     Assessment & Plan: SL   Lucian Henderson Sr. is a 64 year old male admitted on 9/22/2020. He was   admitted via the ED for evaluation of purulent discharge from incisional sites on his left chest wall and abdominal wall. Exam at admission was notable for ~4cm purulent incisional wound w/ perilesional erythema and tenderness; 2-3 cm ovoid epigastric wound, stained violet. CT chest/abdomen/pelvis (09/22/2020) findings are consistent w/ cellulitis of the left supraclavicular and epigastric regions, also  showed fluid collection around the previous balloon pump site where a 8 [place] chimney graft is implanted as well as fluid collection in the old ICD pocket.    #Abscess, left subclavian incision site  #soft tissue infection, abdomen  - Blood culture (09/22/2020): NGTD  - wound culture: moderate growth of Pseudomonas aeruginosa.  - CRP trend: 16.0 (09/22)->42.0 (09/23)  - daily CBC w/ diff  - IV Zosyn 4.5 mg Q6H  - Transplant ID consulted, recs appreciated   > debridement of left subclavian wound, done.   > debridement of abdominal wound, done.   > continue zosyn.   > discontinued vancomycin.  - CV Surgery consulted   > I&D of left subclavian incision site abscess, done.     #ICM s/p OHT  Most recent biopsy (09/14/2020) was negative.  -Immunsuppression:              - continue Prednisone 10 mg am, 5 mg pm              - increase Tacrolimus 4 mg am, 3.5 mg pm              - Hold Mycophenolate 750 mg BID for now              - tacrolimus trough level am, 12 hrs after last dose.  Prophylaxis             - Valgancyclovir 450 mg every 48 hours             - Bactrim (400-80 mg) 1 daily              - Clotrimazole 10 mg lozenge  -Hydralazine 75mg 8hrly      #Hypomagnesemia, resolved              - Keep Mg > 2.0                          >Replacement protocol              - continue  magnesium oxide 400 mg bid    #Normochromic normocytic anemia  possibly anemia of chronic disease. Iron studies: elevated ferritin (447 ng/ml), iron and iron binding cap are wnl. Hold-off on transfusions for now given risk of rejection.    - continue to monitor.     #Leucopenia  -Holding MMF    #Bilateral pitting pedal edema  #Hypoalbuminemia              -consider nutrition consult.     #T2DM  - continue pta Humulin 35 units daily  - MDSSI        #h/o constipation  - Miralax 17 g daily  - Senna-docusate 8.6-50 mg bid     #hx of urinary retention  - Continue PTA Tamsulosin 0.4 mg daily     #h/o DVT  #h/o HIT  - start PTA Fondaparinux 7.5 mg daily.         Diet: Carb-restricted diet.  DVT Prophylaxis: resume Fondaparinux 7.5 mg daily.  GI prophylaxis: Pantoprazole 40 mg daily  Osteoporosis: Calcium carbonate -Vitamin D 600-400mg daily  CAV: Rosuvastatin 10 mg daily  Calderon Catheter: not present  Code Status: Full code  Fluids: None  Lines: PIV      Disposition Plan   Expected discharge: 2 - 3 days, recommended to prior living arrangement once clinical condition has improved.      Entered: Erickson Kelly MD 09/24/2020, 3:49 PM       The patient's care was discussed with the Attending Physician, Dr. Tom De Leon.    Erickson Kelly MD  [facility], [place]  Pager: [phone]  Please see sticky note for cross cover information  ______________________________________________________________________    Interval History   Overnight events reviewed: right arm swelling, US done, showed decreased superficial thrombus in the right cephalic vein, now present only below the antecubital fossa; improved from last US.  No new complaints this am. Right arm swelling resolving.    Data reviewed today: I reviewed all medications, new labs and imaging results over the last 24 hours.     Physical Exam   Vital Signs: Temp: 98.6  F (37  C) Temp src: Oral BP: 135/71 Pulse: 86   Resp: 18 SpO2: 98 % O2 Device: None  (Room air) Oxygen Delivery: 2 LPM  Weight: 181 lbs 3.2 oz  Physical Exam  Constitutional:       Appearance: He is not diaphoretic.   HENT:      Mouth/Throat:      Mouth: Mucous membranes are moist.      Pharynx: No oropharyngeal exudate.   Cardiovascular:      Rate and Rhythm: Normal rate and regular rhythm.      Heart sounds: No murmur. No friction rub. No gallop.    Pulmonary:      Effort: Pulmonary effort is normal. No respiratory distress.      Breath sounds: Normal breath sounds. No stridor. No wheezing, rhonchi or rales.   Chest:      Chest wall: Tenderness present.   Musculoskeletal:      Right lower leg: Edema present.      Left lower leg: Edema present.   Skin:     Coloration: Skin is not jaundiced.   Neurological:      General: No focal deficit present.      Mental Status: He is alert and oriented to person, place, and time.       Data   Recent Labs   Lab 09/24/20  0537 09/23/20  0710 09/22/20  2242 09/22/20  1256   WBC 1.8* 2.4* 2.6* 3.0*   HGB 7.4* 7.9* 6.8* 7.2*    97 100 100    179 169 187   INR 1.18* 1.16*  --  1.13    136  --  133   POTASSIUM 4.7 4.8  --  4.2   CHLORIDE 108 108  --  104   CO2 21 21  --  22   BUN 37* 36*  --  43*   CR 1.49* 1.26*  --  1.50*   ANIONGAP 8 7  --  7   BRYANNA 8.1* 8.2*  --  8.0*   * 203*  --  187*   ALBUMIN  --   --   --  2.5*   PROTTOTAL  --   --   --  5.5*   BILITOTAL  --   --   --  0.3   ALKPHOS  --   --   --  93   ALT  --   --   --  26   AST  --   --   --  17

## 2020-09-24 NOTE — ANESTHESIA CARE TRANSFER NOTE
Patient: Lucian Henderson Sr.    Procedure(s):  INCISION AND DRAINAGE, LEFT SUPRACLAVICULAR WOUND INFECTION AND ABDOMENN WOUND.    Diagnosis: Wound infection [T14.8XXA, L08.9]  Diagnosis Additional Information: No value filed.    Anesthesia Type:   MAC     Note:  Airway :Room Air  Patient transferred to:Telemetry/Step Down Unit  Comments: Patient alert and oriented x 3, WEBB, report called to 6C RN and patient transport back to 6CHandoff Report: Identifed the Patient, Identified the Reponsible Provider, Reviewed the pertinent medical history, Discussed the surgical course, Reviewed Intra-OP anesthesia mangement and issues during anesthesia, Set expectations for post-procedure period and Allowed opportunity for questions and acknowledgement of understanding      Vitals: (Last set prior to Anesthesia Care Transfer)    CRNA VITALS  9/23/2020 1831 - 9/23/2020 1916      9/23/2020             EKG:  Sinus tachycardia                Electronically Signed By: Cely Chen CRNA, APRN CRNA  September 23, 2020  7:16 PM

## 2020-09-24 NOTE — PROGRESS NOTES
Cardiovascular Surgery Progress Note  09/24/2020  Surgeon: Dr. Huertas           Assessment and Plan:     Lucian Henderson Sr. is a 64 year old male with a PMH of end-stage ischemic cardiomyopathy, CAD s/p CABG 2008, DMT2, who was admitted to [facility] 07/03 for heart failure exacerbation with cardiogenic shock, underwent right subclavian IABP insertion 07/16 with Dr. Sparrow, and then heart transplant 07/20 with Dr. Griselli. 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 Now on Fondaparinux. Readmitted to [facility] 09/22 with hyperglycemia, CV surgery consulted for left former ICD pocket infection and former chest tube site infection. Underwent I&D in OR 09/23 with Dr. Huertas.     Former ICD pocket infection (left chest)  Former chest tube site infection (upper abdomen)  - S/p I&D 09/23 in OR with Dr. Huertas  - Continue TID packing change (RN to do)  - Transplant ID following. Abdominal wound culture 09/23 growing strep mitis and pseudomonas. Left chest former ICD pocket culture 09/23 growing pseudomonas. Continues on zosyn  - Of note, on 09/23 vascular surgery reviewed the CT imaging of right subclavian IABP graft site given question of right subclavian pseudoaneurysm vs localized fluid collection. Wound currently appears well healing, without active infection, and vascular surgery felt it did not warrant wound exploration at this time.      Discussed with CVTS Fellow as needed.  Staff surgeons have been informed of changes through both verbal and written communication.      Trixie Quintanilla PA-C  Cardiothoracic Surgery  Pager [phone]            Interval History:   Denies complaint. Had pain with dressing change, but otherwise pain controlled         Physical Exam:   Blood pressure 106/63, pulse 85, temperature 98.6  F (37  C), temperature source Oral, resp. rate 16, weight 82.2 kg (181 lb 3.2 oz), SpO2 98 %.  Vitals:    09/23/20 0101 09/24/20 0508   Weight: 84.1 kg (185 lb 8 oz) 82.2 kg  (181 lb 3.2 oz)        Gen: NAD, resting in bed  CV: RRR, S1S2 normal, no murmurs, rubs, or gallops.   Pulm: clear to auscultation bilaterally, no rhonchi or wheezes  Abd: soft, non-tender, no guarding, +BM  Ext: no lower extremity edema  Incision: sternal incision: well healed. Right subclavian IABP site incision well healed. Left subclavian former ICD pocket- packing removed, some active bleeding noted treated with silver nitrate, wound edges pick, no purulence. Upper abdomen former chest tube site- minor bleeding when packing removed treated with silver nitrate, wound edges pink, no purulence.   Neuro: grossly normal  Psych: calm, cooperative            Data:    Imaging:  reviewed recent imaging  Recent Results (from the past 24 hour(s))   US Upper Extremity Venous Duplex Right    Narrative    EXAMINATION: DOPPLER VENOUS ULTRASOUND OF THE RIGHT UPPER EXTREMITY,  9/24/2020 2:40 AM     COMPARISON: 7/22/2020    HISTORY: Right arm swelling    TECHNIQUE:  Gray-scale evaluation with compression, spectral flow and  color Doppler assessment of the deep venous system of the right upper  extremity.    FINDINGS:  Right: Normal blood flow and waveforms are demonstrated in the  internal jugular, innominate, subclavian, and axillary veins. There is  normal compressibility of the brachial, and basilic veins. The right  cephalic vein in the upper arm, mid forearm and wrist is fully  compressible. The right cephalic vein below the antecubital fossa is  noncompressible.      Impression    IMPRESSION:  1.  No evidence of right upper extremity deep venous thrombosis.  Resolution of the previously seen thrombus in the right IJ and right  axillary veins.  2.  Decreased extent of the superficial thrombus in the right cephalic  vein, now present only below the antecubital fossa.    I have personally reviewed the examination and initial interpretation  and I agree with the findings.    SHILPI GRIFFIN MD         Labs:  BMP  Recent Labs    Lab 09/24/20  0537 09/23/20  0710 09/22/20  1256 09/21/20  1017    136 133 133   POTASSIUM 4.7 4.8 4.2 4.7   CHLORIDE 108 108 104 104   BRYANNA 8.1* 8.2* 8.0* 7.9*   CO2 21 21 22 20   BUN 37* 36* 43* 45*   CR 1.49* 1.26* 1.50* 1.57*   * 203* 187* 212*     CBC  Recent Labs   Lab 09/24/20  0537 09/23/20  0710 09/22/20  2242 09/22/20  1256   WBC 1.8* 2.4* 2.6* 3.0*   RBC 2.47* 2.62* 2.22* 2.34*   HGB 7.4* 7.9* 6.8* 7.2*   HCT 24.6* 25.5* 22.2* 23.5*    97 100 100   MCH 30.0 30.2 30.6 30.8   MCHC 30.1* 31.0* 30.6* 30.6*   RDW 19.8* 19.1* 20.2* 20.0*    179 169 187     INR  Recent Labs   Lab 09/24/20  0537 09/23/20  0710 09/22/20  1256   INR 1.18* 1.16* 1.13      Liver Function Studies -   Recent Labs   Lab Test 09/22/20  1256   PROTTOTAL 5.5*   ALBUMIN 2.5*   BILITOTAL 0.3   ALKPHOS 93   AST 17   ALT 26     GLUCOSE:   Recent Labs   Lab 09/24/20  0831 09/24/20  0537 09/24/20  0358 09/24/20  0038 09/23/20  1928 09/23/20  1742 09/23/20  1622  09/23/20  0710  09/22/20  1256 09/21/20  1017 09/18/20  0954   GLC  --  141*  --   --   --   --   --   --  203*  --  187* 212* 252*   *  --  154* 185* 89 74 93   < >  --    < >  --   --   --     < > = values in this interval not displayed.

## 2020-09-24 NOTE — PLAN OF CARE
D: Pt who presents this admission with purulent discharge from L clavicular and mid abdominal incisio. Pt now s/p I&D of both incisions, done by Dr. Huertas on 9/23/20. Hx of OHT (7/19/20), DM type II, CKD stage 3, RUE DVT c/b HIT, and HTN    I/A: Pt alert and oriented x4. Pt SR/ST with HRs 80-100s. On RA with O2 sats >92%. Denies any pain this shift, pre and post procedure. L upper supraclavicular and abdomen incision dressings CDI. Per orders, dressings to remain on for 24 hrs. IV abx given as scheduled. Pt appetite good, denies nausea. BG checks this shift 98 and 89, sliding scale insulin not given. Last BM today. Pt has not voided yet post-procedure. Per pt, will try again in an hour. Pt has been NPO all day and has not been drinking much fluids. RN will update night RN to followup with voiding. Pt up with +1 assist-SBA in room.     P: Continue to monitor and assess pt with every encounter. Notify CVTS with any changes or concerns.

## 2020-09-24 NOTE — OR NURSING
Pt arrived in PACU to be determined if he could bypass PACU. Pt stable, reporting no pain but  (baseline 80). Yordy GARZA notified and stated that we would not do anything for the patient heart rate at this time and that he can be transferred back to his room.

## 2020-09-24 NOTE — PLAN OF CARE
/68 (BP Location: Right arm)   Pulse 85   Temp 97.9  F (36.6  C) (Axillary)   Resp 17   Wt 82.2 kg (181 lb 3.2 oz)   SpO2 100%   BMI 30.15 kg/m      D: Patient is a 64 year old male with a history of OHT (7/19/20), DM type II, CKD stage 3, RUE DVT c/b HIT, and HTN presented with increased glucose and sepsis.  I/A: Patient had I&D procedure to the L-subclavicular and mid abdomen by Dr. Huertas on 9/23/2020, sites are covered with dressing c/d/i, no drainage noted.  Patient is on room air, denies pain and nausea, on tele with SR in 80s.  A&OX4, VSS mild hypertension on scheduled hydralazine.  Patient is on clear liquid diet and 2L FR.  R-arm, is noted with have +3 edema and provider was notified.  Dr. Whipple came and assessed the patient and US was done to r/o DVT and with superficial clot, now elevating the arm using pillow.  The culture from the L-subclavicular chest has gram negative rods and on Zosyn IVPB.  Voiding using bedside urinal, patient had BM with incontinent, pericare provided and gown changed, up with SBA.  P: Will continue with cares and notify MD of status changes.

## 2020-09-24 NOTE — PLAN OF CARE
D: Admitted s/p purulent discharge from chest wall incision site, pt now I & D of left subclavian wound  PMH of CAD, ICM s/p OHT (7/19/2020) and T2DM     I: Monitored vitals and assessed pt status.     PRN: Oxycodone      A: Pt A0x4. [place] speaking. Afebrile. VSS. HRs 80s-90s. Sinus rhythm. Sats >92% on RA. Pt denies any shortness of breath at rest, chest pain/palpitation, nausea, dizziness, or chills. Pt states pain at incisional site, scheduled Tylenol and Oxycodone given w/relief. Left upper subclavian and abdominal dressings CDI. Pt has some tremors, states that it is baseline. Right arm edematous, elevating on pillow. 3+ edema on lower extremities. BGs before meals. Pt is on a CHO diet w/2L FR. Pt is up SBA.       P: Continue to monitor pt status and notify Cards 2 and CVTS treatment team with any changes.

## 2020-09-24 NOTE — PROGRESS NOTES
[facility]    Transplant Infectious Diseases Inpatient Progress Note      Lucian Henderson . MRN# [id number]   YOB: 1955 Age: 64 year old   Date of Admission and time: 9/22/2020 12:31 PM             Recommendations:   1. Please discontinue vancomycin.   2. Continue zosyn for now.   3. Will follow on cultures results.         Summary of Presentation:   Transplants:  7/19/2020 (Heart), Postoperative day:  67     This patient is a 64 year old male with ICMP s/p OHT with basiliximab induction and TAC/MMF/prednisone maintenance.   Course complicated by DGF, HIT, RUE DVT.   Presented from cardiology clinic with wound infection.         Active Problems and Infectious Diseases Issues:   1. Surgical wound infection involving the left subclavian wound (prior ICD site) and the abdominal wound (prior drain site) with P aeruginosa.  The Coag Neg Staph is of no clinical value and does not need to be covered.   The S mitis is covered with zosyn in case is significant but I doubt that it is.     Underwent surgical debridement 9/23/20.      The chimney graft in the right subclavian area does not seem to be involved. But will follow on blood cx for the possibility of secondary seeding.   ID will follow with you.     2. EBV viremia  No signs or symptoms for PTLD.   Monitor EBV by PCR per your protocol but I would avoid checking EBV PCR now due to acute illness which may result in falsely elevated viral load.         Old Problems and Infectious Diseases Issues:   1. Donor with S salivarius positive blood cx; the patient received the usual perioperative ABx then ceftriaxone for total ABx of 7 days.   2. Donor sputum with P fluorescens that failed to grow and Enterobacter spp. The recipient was not treated for these organisms.      Other Infectious Disease issues include:  - QTc 420 as of 9/22/202.   - PCP prophylaxis: Bactrim   - Serostatus: CMV D+/R+, EBV D+/R+, HSV1+/2-, VZV +         On VGCV for universal ppx.   - Immunization status: Too soon to be discussed.   - Gamma globulin status: not recently checked        Attestation:  I interviewed the patient and obtained history from the patient, the wife at the bedside, and by reviewing the patient's chart including outside records, microbiological data, and radiological data. All data are summarized in this notes.  Jillian Hurtado MD   Pager: [phone]  09/24/2020           Interim History of Present Illness:   Underwent debridement of the midliine abdominal wound and surgical debridement of the left subclavian wound on 9/23/20. No fever. No new complaints.          History of Present Illness:   Transplants:  7/19/2020 (Heart), Postoperative day:  67     This patient is a 64 year old male with ICMP s/p OHT with basiliximab induction and TAC/MMF/prednisone maintenance. He had ICD removed from the left subclavian area and right subclavian IABP placed then removed with chimney graft in place.   Course complicated by DGF, HIT, RUE DVT.  The patient presented to the transplant clinic 9/22/20 and was found to have erythema, swelling, tenderness and warmth of the left subclavian and midline abdominal wounds. He was sent to [facility] where blood cx were obtained, wound swab of the left subclavian wound were sent. The patient was then started on zosyn and vancomycin.     ID consulted.     The patient stated that 5 days before admission, he started to develop pain in the involved areas for which he took tylenol. He denied fever or chills. He denied seeing drainage. Denied trauma to the areas.     Exposure History  Was born in [place], stated that he lived for 60 years in the [place], lived in [place] for 50-60 years.   He lives in [place], [place]. Lives with wife and one dog.   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   He stated he was tested for TB in the remote past and was negative.   He denied exposure to TB.   No  institutionalization.   He travels to [place] frequently. His last travel outside of [place] was to [place] 2 years ago.   No significant outdoors activities.                 Review of Systems:      As mentioned in the HPI otherwise negative by reviewing constitutional symptoms, central and peripheral neurological systems, respiratory system, cardiac system, GI system,  system, musculoskeletal, skin, allergy, and lymphatics.                    Immunizations:     Immunization History   Administered Date(s) Administered     Influenza (IIV3) PF 10/05/2011, 10/15/2012     Influenza Vaccine IM > 6 months Valent IIV4 10/27/2015, 09/14/2016, 10/05/2017, 10/03/2018     Pneumococcal 23 valent 08/04/2009, 04/03/2015     TDAP Vaccine (Adacel) 08/04/2009            Allergies:     Allergies   Allergen Reactions     Heparin Heparin Induced Thrombocytopenia     HIT screen sent 7/18/2020. CORRINE confirmed positive             Medications:   Medications that Require Transfusion:     - MEDICATION INSTRUCTIONS -       - MEDICATION INSTRUCTIONS -       ACE/ARB/ARNI NOT PRESCRIBED       Scheduled Medications:     acetaminophen  975 mg Oral Q8H     aspirin  81 mg Oral Daily     calcium carbonate 600 mg-vitamin D 400 units  1 tablet Oral BID w/meals     clotrimazole  1 lozenge Buccal 4x Daily     fondaparinux ANTICOAGULANT  7.5 mg Subcutaneous Q24H     hydrALAZINE  75 mg Oral Q8H     insulin aspart  1-6 Units Subcutaneous Q4H     insulin isophane human  35 Units Subcutaneous QAM     magnesium oxide  400 mg Oral BID     [Held by provider] mycophenolate  750 mg Oral BID     pantoprazole  40 mg Oral QAM AC     piperacillin-tazobactam  4.5 g Intravenous Q6H     predniSONE  10 mg Oral QAM AC     predniSONE  5 mg Oral QPM     rosuvastatin  10 mg Oral Daily     senna-docusate  1 tablet Oral BID    Or     senna-docusate  2 tablet Oral BID     sodium chloride (PF)  3 mL Intracatheter Q8H     sulfamethoxazole-trimethoprim  1 tablet Oral Daily      tacrolimus  3.5 mg Oral QPM     tacrolimus  4 mg Oral QAM     tamsulosin  0.4 mg Oral Daily     valGANciclovir  450 mg Oral Daily     vancomycin (VANCOCIN) IV  1,500 mg Intravenous Q24H               Physical Exam:   Temp: 98.3  F (36.8  C) Temp src: Oral BP: 122/67 Pulse: 93   Resp: 16 SpO2: 94 % O2 Device: None (Room air) Oxygen Delivery: 2 LPM    Wt Readings from Last 4 Encounters:   09/24/20 82.2 kg (181 lb 3.2 oz)   08/28/20 76.9 kg (169 lb 8.5 oz)   08/17/20 79.8 kg (176 lb)   08/10/20 79.8 kg (176 lb)     Constitutional: awake, alert, cooperative, no apparent distress and appears at stated age, well nourished.   Extremities: +4 pitting edema of bilateral lower extremities, no ulcers, normal ROM of all joints, no swelling or erythema of any of joints and no tenderness to palpation.   Skin: the left subclavian wound and the midline abdominal wound are now open with good granulation tissue and no drainage and without surrounding erythema. No rash            Data:   No results found for: ACD4    Inflammatory Markers    Recent Labs   Lab Test 09/23/20  0710 09/22/20  1256 08/25/20  0445 07/08/19  1021   CRP 42.0* 16.0* <2.9 9.9       Immune Globulin Studies     Recent Labs   Lab Test 04/09/15  0721   IGG 1,310   IGM 38*          Metabolic Studies       Recent Labs   Lab Test 09/24/20  0537 09/23/20  1147 09/23/20  0710 09/22/20  1313 09/22/20  1256 09/21/20  1017 09/18/20  0954 09/14/20  1153  08/17/20  0847  07/22/20  1608  07/03/20  1559     --  136  --  133 133 134 135   < > 133   < > 140   < > 133   POTASSIUM 4.7  --  4.8  --  4.2 4.7 4.5 4.1   < > 4.8   < > 4.9   < > 4.4   CHLORIDE 108  --  108  --  104 104 107 106   < > 102   < > 110*   < > 102   CO2 21  --  21  --  22 20 17* 22   < > 26   < > 25   < > 25   ANIONGAP 8  --  7  --  7 9 10 7   < > 6   < > 5   < > 6   BUN 37*  --  36*  --  43* 45* 40* 47*   < > 67*   < > 40*   < > 33*   CR 1.49*  --  1.26*  --  1.50* 1.57* 1.17 1.30*   < > 1.67*   <  > 1.47*   < > 1.65*   GFRESTIMATED 48*  --  59* 47* 48* 45* 64 56*   < > 42*   < > 49*   < > 42*   *  --  203*  --  187* 212* 252* 111*   < > 391*   < > 134*   < > 229*   A1C  --   --   --   --   --   --   --   --   --   --   --   --   --  8.2*   BRYANNA 8.1*  --  8.2*  --  8.0* 7.9* 8.0* 7.8*   < > 8.3*   < > 8.1*   < > 8.7   PHOS  --   --   --   --   --  1.8* 1.5* 2.2*   < > 3.5   < > 2.8   < >  --    MAG 2.0  --  1.8  --  1.9 1.1* 1.5* 1.3*   < > 1.9   < > 2.3   < > 2.2   LACT  --  1.3  --   --   --   --   --   --   --   --   --  1.0   < >  --    CKT  --   --   --   --   --   --   --   --   --  76  --   --   --   --     < > = values in this interval not displayed.       Hepatic Studies    Recent Labs   Lab Test 09/22/20  1256 09/14/20  1153 08/31/20  0856 08/25/20  1705 08/24/20  2026 08/17/20  0847 08/13/20  0713  07/08/19  1021   BILITOTAL 0.3 0.3 0.4  --  0.5 0.5 0.6   < > 0.6   ALKPHOS 93 100 117  --  124 141 91   < > 126   ALBUMIN 2.5* 2.7* 2.7*  --  2.7* 2.7* 2.6*   < > 3.4   AST 17 19 13  --  12 13 16   < > 14   ALT 26 26 31  --  28 29 27   < > 20   LDH  --   --   --  433*  --   --   --   --  174    < > = values in this interval not displayed.       Pancreatitis testing    Recent Labs   Lab Test 08/17/20  0847 07/19/20  1613 07/08/19  1021 08/16/18  0834 07/05/17  0732 07/06/16  0717 06/27/15  0755   AMYLASE  --  36  --   --   --   --   --    TRIG 82  --  181* 297* 226* 225* 99       Hematology Studies      Recent Labs   Lab Test 09/24/20  0537 09/23/20  0710 09/22/20  2242 09/22/20  1256 09/21/20  1017 09/18/20  0954  08/24/20  2026  07/26/20  0545  07/19/20  1613   WBC 1.8* 2.4* 2.6* 3.0* 3.1* 4.2   < > 4.9   < > 9.2   < > 7.7   ANEU 1.5*  --  2.4 2.8  --   --   --  4.2  --  8.5*  --  6.0   ALYM 0.2*  --  0.0* 0.0*  --   --   --  0.3*  --  0.1*  --  0.6*   ELAN 0.1  --  0.1 0.2  --   --   --  0.2  --  0.3  --  0.8   AEOS 0.0  --  0.0 0.0  --   --   --  0.0  --  0.0  --  0.3   HGB 7.4* 7.9* 6.8* 7.2*  7.2* 7.7*   < > 6.8*   < > 8.2*   < > 10.8*   HCT 24.6* 25.5* 22.2* 23.5* 22.9* 24.5*   < > 21.9*   < > 25.8*   < > 34.0*    179 169 187 179 152   < > 193   < > 216   < > 99*    < > = values in this interval not displayed.       Clotting Studies    Recent Labs   Lab Test 09/24/20  0537 09/23/20  0710 09/22/20  1256 08/25/20  0445  08/03/20  0559 08/02/20  0641 08/01/20  0702   INR 1.18* 1.16* 1.13 1.11   < >  --   --   --    PTT  --   --  31  --   --  29 29 25    < > = values in this interval not displayed.       Arterial Blood Gas Testing    Recent Labs   Lab Test 07/24/20  0829 07/24/20  0413 07/23/20  2109 07/23/20  0404 07/22/20  2122 07/22/20  1608 07/22/20  1206 07/22/20  0845   PH  --   --   --  7.47* 7.44 7.44 7.43 7.41   PCO2  --   --   --  37 38 39 32* 34*   PO2  --   --   --  104 111* 80 103 123*   HCO3  --   --   --  27 26 27 22 21   O2PER 1.0L 1.5L 1.5L 40  40 40 40  40 40.0 40.0  40.0        Urine Studies     Recent Labs   Lab Test 09/22/20  1448 08/25/20  0731 07/19/20  1930 07/17/20  1402 07/08/19  1017   URINEPH 5.0 5.0 6.0 5.0 6.0   NITRITE Negative Negative Negative Negative Negative   LEUKEST Negative Negative Negative Large* Large*   WBCU 1 0 <1 13* >182*       Tacrolimus levels    Invalid input(s): TACROLIMUS, TAC, TACR  Transplant Immunosuppression Labs Latest Ref Rng & Units 9/24/2020 9/23/2020 9/22/2020 9/22/2020 9/22/2020   Tacro Level 5.0 - 15.0 ug/L 8.4 - - - -   Tacro Level - Not Provided - - - -   Creat 0.66 - 1.25 mg/dL 1.49(H) 1.26(H) - 1.5(H) 1.50(H)   BUN 7 - 30 mg/dL 37(H) 36(H) - - 43(H)   WBC 4.0 - 11.0 10e9/L 1.8(L) 2.4(L) 2.6(L) - 3.0(L)   Neutrophil % 83.4 - 94.0 - 92.9   ANEU 1.6 - 8.3 10e9/L 1.5(L) - 2.4 - 2.8       Microbiology:  Blood cx negative   Wound cx swab with P aeruginosa and S mitis from the abdominal wound and P aeruginosa and Coag Neg Staph from the subclavian wound.   Surgical wound cx only with P aeruginosa.   Last check of C difficile  No results  found for: CDBPCT    Virology:  CMV viral loads  No results found for: 92831, 82999, 75599, 60980, CMVQAL  CMV viral loads    Recent Labs   Lab Test 08/17/20  0847   CSPEC Plasma, EDTA anticoagulant   CMVLOG Not Calculated       CMV viral loads    Log IU/mL of CMVQNT   Date Value Ref Range Status   08/17/2020 Not Calculated <2.1 [Log_IU]/mL Final       CMV resistance testing  No lab results found.  No results found for: CMVCID, CMVFOS, CMVGAN     No results found for: H6RES    EBV DNA Copies/mL   Date Value Ref Range Status   08/17/2020 766 (A) EBVNEG^EBV DNA Not Detected [Copies]/mL Final       CMV Antibody IgG   Date Value Ref Range Status   07/19/2020 >8.0 (H) 0.0 - 0.8 AI Final     Comment:     Positive  Antibody index (AI) values reflect qualitative changes in antibody   concentration that cannot be directly associated with clinical condition or   disease state.     07/08/2019 >8.0 (H) 0.0 - 0.8 AI Final     Comment:     Positive  Antibody index (AI) values reflect qualitative changes in antibody   concentration that cannot be directly associated with clinical condition or   disease state.         Toxoplasma Antibody IgG   Date Value Ref Range Status   07/08/2019 <3.0 0.0 - 7.1 IU/mL Final     Comment:     Negative- Absence of detectable Toxoplasma gondii IgG antibodies. A negative   result does not rule out acute infection.  The magnitude of the measured result is not indicative of the amount of   antibody present. The concentrations of anti-Toxoplasma gondii IgG in a given   specimen determined with assays from different manufacturers can vary due to   differences in assay methods and reagent specificity.           Imaging:  CT c/a/p 9/22/2020   IMPRESSION:   In this patient who is approximately 2 months postop orthotopic heart  transplant:   1. Left supraclavicular subcutaneous soft tissue thickening and fat  stranding is likely an infectious/inflammatory process originating  from the patient's prior left  implantable cardiac defibrillator chest  wall pocket. No appreciable associated drainable fluid collection,  though this process is incompletely visualized, extending above the  field-of-view.  2. Simple appearing anterior mediastinal/pericardial fluid collection  with thin rim enhancement but no significant associated inflammatory  fat stranding, likely a postoperative serous collection. There is no  evidence of sternal wound involvement or communication with the  presumed left supraclavicular fossa cellulitis.   3. Small region of isolated skin/subcutaneous soft tissue thickening  in the epigastric region, likely corresponding to the site of a prior  pericardial drain and suggestive of cellulitis. No drainable fluid  collection at this location.   4. Retained vascular access cannula in the right supraclavicular fossa  with an adjacent elongated subclavian artery pseudoaneurysm, contained  by surrounded by soft tissue thickening.  4. Small left and trace right pleural effusions.  5. Cholelithiasis without cholecystitis.      Jillian Hurtado MD  Pager: [phone]  09/24/2020

## 2020-09-24 NOTE — ANESTHESIA POSTPROCEDURE EVALUATION
Anesthesia POST Procedure Evaluation    Patient: Lucian Henderson .   MRN:     [id number] Gender:   male   Age:    64 year old :      1955        Preoperative Diagnosis: Wound infection [T14.8XXA, L08.9]   Procedure(s):  INCISION AND DRAINAGE, LEFT SUPRACLAVICULAR WOUND INFECTION AND ABDOMENN WOUND.   Postop Comments: No value filed.     Anesthesia Type: MAC       Disposition: Admission   Postop Pain Control: Uneventful            Sign Out: Well controlled pain   PONV: No   Neuro/Psych: Uneventful            Sign Out: Acceptable/Baseline neuro status   Airway/Respiratory: Uneventful            Sign Out: Acceptable/Baseline resp. status   CV/Hemodynamics: Uneventful            Sign Out: Acceptable CV status   Other NRE: NONE   DID A NON-ROUTINE EVENT OCCUR? No         Last Anesthesia Record Vitals:  CRNA VITALS  2020 1831 - 2020 1931      2020             EKG:  Sinus tachycardia          Last PACU Vitals:  Vitals Value Taken Time   /69 2020  9:30 PM   Temp     Pulse 85 2020  9:34 PM   Resp     SpO2 97 % 2020  9:34 PM   Temp src     NIBP     Pulse     SpO2     Resp     Temp     Ht Rate     Temp 2     Vitals shown include unvalidated device data.      Electronically Signed By: Isidro Scales MD, 2020, 9:35 PM

## 2020-09-24 NOTE — PROGRESS NOTES
09/24/20 1102   Quick Adds   Type of Visit Initial Occupational Therapy Evaluation  (+ Edema eval)   Living Environment   Lives With spouse   Living Arrangements house   Home Accessibility stairs to enter home   Number of Stairs, Main Entrance 2  (no railing, pt reports has support from wife prn)   Stair Railings, Main Entrance none   Transportation Anticipated family or friend will provide;car, drives self   Living Environment Comment Pt lives in [facility] in [place] with wife and dog. No difficulties with 2 stairs to enter in past, no handrail but supportive wife who could assist if needed. Tub/shower combo with grab bars but no shower chair. Enjoys being active around the house with chores.   Self-Care   Usual Activity Tolerance good   Current Activity Tolerance moderate   Regular Exercise Yes   Activity/Exercise Type walking;swimming  (Gym membership)   Equipment Currently Used at Home grab bar, tub/shower   Activity/Exercise/Self-Care Comment Pt has gym membership and enjoys walking and swimming and visiting the sauna. Active around home assisting with household management.   Functional Level   Ambulation 0-->independent   Transferring 0-->independent   Toileting 0-->independent   Bathing 0-->independent   Dressing 0-->independent   Eating 0-->independent   Communication 0-->understands/communicates without difficulty   Swallowing 0-->swallows foods/liquids without difficulty   Cognition 0 - no cognition issues reported   Fall history within last six months no   Which of the above functional risks had a recent onset or change? ambulation;transferring;toileting;bathing;dressing   Prior Functional Level Comment Pt was IND with ADLs at baseline.       Present yes   Language Yi   General Information   Onset of Illness/Injury or Date of Surgery - Date 09/22/20   Referring Physician Remberto Sherman MD   Patient/Family Goals Statement Return home   Additional Occupational  "Profile Info/Pertinent History of Current Problem Per Cardiology H&P: \"Lucian Henderson Sr. is a 64 year old male w/ a pmhx of CAD, ICM s/p OHT (07/19/2020), and T2DM on insulin. He was admitted via the ED following referral from clinic d/t purulent discharge from chest wall incision site.\"   Precautions/Limitations fall precautions;other (see comments)  (Surgical precautions (conservative L shoulder flexion limit))   Weight-Bearing Status - LUE partial weight-bearing (% in comments)  (<10lbs)   Weight-Bearing Status - RUE full weight-bearing   Weight-Bearing Status - LLE full weight-bearing   Weight-Bearing Status - RLE full weight-bearing   General Observations IV. Wife present and supportive. Pt wanting to return home, becoming frustrated over course of session with questioning.   General Info Comments Activity orders: activity up with assist   Edema General Information   Onset of Edema   (Acute on Chronic LE edema (s/p OHT), RUE edema ~1 day)   Affected Body Part(s) Right UE;Left LE;Right LE   Edema Etiology Surgery  (OHT, CHF)   Etiology Comments Low albumin   General Comments/Previous Edema Treatment/Edema Equipment Patient receiving lymphedema services during previous hospitalization (s/p OHT in July). Has been continuing GCB use at home with patient's wife wrapping. RUE edema new onset for past ~1 day. Reports to be improving. Recommended OP edema follow-up after previous discharge but patient preferring to have wife help manage via GCBs.    Edema Examination/Assessment   Skin Condition Pitting;Intact   Pitting Assessment Deep 2-3+ pitting in bilateral LE's. Reports consistent from previous weeks. Limited assessment due to patient becoming increasingly frustrated with volume of questions during evaluation. Mild edema in distal RUE/hand. 1-2+ around elbow. Patient reporting has been improving today with elevation.    Cognitive Status Examination   Orientation person  (place and time not assessed) "   Level of Consciousness alert   Follows Commands (Cognition) WFL   Cognitive Comment Pt denies changes in cognition. Alert, oriented to situation, and responds appropriately in conversation.   Visual Perception   Visual Perception Comments Pt does not wear glasses, reports baseline loss of vision in L eye and feels some reduction in R eye following recent procedure. Awaiting eye appointment. Pt able to track with some difficulty with fixation. Peripheral appears intact on R side, L side not tested.   Sensory Examination   Sensory Comments Denies n/t.   Pain Assessment   Patient Currently in Pain Yes, see Vital Sign flowsheet  (LUE site, abdomen)   Integumentary/Edema   Integumentary/Edema Comments RUE and BLE. See details above.   Range of Motion (ROM)   ROM Comment ROM limited by post-surgical precautions on LUE. Pt demonstrating R shoulder flexion WFL but refusing further screening.   Strength   Strength Comments not tested.   Coordination   Coordination Comments BUE presenting with mild tremor. Pt gross motor appears intact per engagement in session activities.   Transfer Skill: Sit to Stand   Level of [place]: Sit/Stand contact guard   Balance   Balance Comments CGA for ambulating in hallway for safety 2/2 edema in BLE.   Eating/Self Feeding   Level of [place]: Eating independent   Instrumental Activities of Daily Living (IADL)   Previous Responsibilities housekeeping;medication management;yardwork;finances;driving   IADL Comments Pt was IND with most IADLs. Wife is primary homemaker and cook in the family. Receives assistance from adult daughter with medication management (filling pillbox).   Activities of Daily Living Analysis   Impairments Contributing to Impaired Activities of Daily Living balance impaired;pain;post surgical precautions;coordination impaired;ROM decreased;strength decreased   General Therapy Interventions   Planned Therapy Interventions ADL retraining;IADL retraining;bed mobility  "training;ROM;strengthening;transfer training;progressive activity/exercise   Clinical Impression   Criteria for Skilled Therapeutic Interventions Met yes, treatment indicated   OT Diagnosis Impaired completion of ADLs/IADLs and safety.   Edema: Patient Presentation Edema   Influenced by the following impairments pain, post-op precautions, ROM, strength, balance, coordination   Assessment of Occupational Performance 5 or more Performance Deficits   Identified Performance Deficits dressing, bathing, transferring, household management, driving   Clinical Decision Making (Complexity) Low complexity   Therapy Frequency Daily   Predicted Duration of Therapy Intervention (days/wks) 1-3 days   Anticipated Discharge Disposition Home with Assist;Home with Outpatient Therapy   Risks and Benefits of Treatment have been explained. Yes   Patient, Family & other staff in agreement with plan of care Yes   Clinical Impression Comments Pt will benefit from skilled OT services to promote independence with ADLs and functional mobility within precautions. Would benefit from edema services, however given anticipated short LOS, not currently appropriate to initiate compression given likely inability to follow-up re: skin integrity, compression tolerance, etc. (Patient reports hoping to discharge today or tomorrow).    [facility] AM-PAC  \"6 Clicks\" Daily Activity Inpatient Short Form   1. Putting on and taking off regular lower body clothing? 3 - A Little   2. Bathing (including washing, rinsing, drying)? 3 - A Little   3. Toileting, which includes using toilet, bedpan or urinal? 3 - A Little   4. Putting on and taking off regular upper body clothing? 3 - A Little   5. Taking care of personal grooming such as brushing teeth? 4 - None   6. Eating meals? 4 - None   Daily Activity Raw Score (Score out of 24.Lower scores equate to lower levels of function) 20   Total Evaluation Time   Total Evaluation Time (Minutes) 20     "

## 2020-09-24 NOTE — PROGRESS NOTES
Postop check    Pt is POD0 s/p I&D of chest.    Pain well controlled. Denies n/v, chest pain, SOB.    Vitals:    09/23/20 1532 09/23/20 1614 09/23/20 1925 09/23/20 1933   BP: 108/78  138/69 116/64   BP Location: Left arm  Right arm    Pulse: 89 80 106 100   Resp: 16  16    Temp: 98.2  F (36.8  C)  97.7  F (36.5  C)    TempSrc: Oral  Oral    SpO2: 98%  98% 100%   Weight:           Gen: NAD, resting comfortably  CV: RRR  Chest: wounds dressed , dry and without strikethrough  Resp: nonlabored respirations, comfortable on RA  Abd: soft, ntnd  Ext: WWP    A/P  64 year old male now s/p incision and drainage, doing well postoperatively. No acute concerns.    Continue management per primary team. Okay for diet.      Cherry Pat MD (PGY-1)  Surgery

## 2020-09-25 DIAGNOSIS — Z94.1 HEART REPLACED BY TRANSPLANT (H): Primary | ICD-10-CM

## 2020-09-25 LAB
ALBUMIN UR-MCNC: 10 MG/DL
ANION GAP SERPL CALCULATED.3IONS-SCNC: 5 MMOL/L (ref 3–14)
ANISOCYTOSIS BLD QL SMEAR: SLIGHT
APPEARANCE UR: CLEAR
BACTERIA SPEC CULT: ABNORMAL
BASOPHILS # BLD AUTO: 0 10E9/L (ref 0–0.2)
BASOPHILS NFR BLD AUTO: 0 %
BILIRUB UR QL STRIP: NEGATIVE
BUN SERPL-MCNC: 36 MG/DL (ref 7–30)
CALCIUM SERPL-MCNC: 7.8 MG/DL (ref 8.5–10.1)
CHLORIDE SERPL-SCNC: 111 MMOL/L (ref 94–109)
CO2 SERPL-SCNC: 23 MMOL/L (ref 20–32)
COLOR UR AUTO: YELLOW
CREAT SERPL-MCNC: 1.74 MG/DL (ref 0.66–1.25)
CRP SERPL-MCNC: 16 MG/L (ref 0–8)
DIFFERENTIAL METHOD BLD: ABNORMAL
EOSINOPHIL # BLD AUTO: 0 10E9/L (ref 0–0.7)
EOSINOPHIL NFR BLD AUTO: 0 %
ERYTHROCYTE [DISTWIDTH] IN BLOOD BY AUTOMATED COUNT: 19.7 % (ref 10–15)
GFR SERPL CREATININE-BSD FRML MDRD: 40 ML/MIN/{1.73_M2}
GLUCOSE BLDC GLUCOMTR-MCNC: 104 MG/DL (ref 70–99)
GLUCOSE BLDC GLUCOMTR-MCNC: 166 MG/DL (ref 70–99)
GLUCOSE BLDC GLUCOMTR-MCNC: 250 MG/DL (ref 70–99)
GLUCOSE BLDC GLUCOMTR-MCNC: 260 MG/DL (ref 70–99)
GLUCOSE BLDC GLUCOMTR-MCNC: 291 MG/DL (ref 70–99)
GLUCOSE SERPL-MCNC: 97 MG/DL (ref 70–99)
GLUCOSE UR STRIP-MCNC: 70 MG/DL
HCT VFR BLD AUTO: 24.8 % (ref 40–53)
HGB BLD-MCNC: 7.4 G/DL (ref 13.3–17.7)
HGB UR QL STRIP: NEGATIVE
INR PPP: 1.16 (ref 0.86–1.14)
KETONES UR STRIP-MCNC: NEGATIVE MG/DL
LEUKOCYTE ESTERASE UR QL STRIP: NEGATIVE
LYMPHOCYTES # BLD AUTO: 0.2 10E9/L (ref 0.8–5.3)
LYMPHOCYTES NFR BLD AUTO: 14 %
Lab: ABNORMAL
MAGNESIUM SERPL-MCNC: 2.1 MG/DL (ref 1.6–2.3)
MCH RBC QN AUTO: 29.4 PG (ref 26.5–33)
MCHC RBC AUTO-ENTMCNC: 29.8 G/DL (ref 31.5–36.5)
MCV RBC AUTO: 98 FL (ref 78–100)
MONOCYTES # BLD AUTO: 0.1 10E9/L (ref 0–1.3)
MONOCYTES NFR BLD AUTO: 7.9 %
MUCOUS THREADS #/AREA URNS LPF: PRESENT /LPF
NEUTROPHILS # BLD AUTO: 1.3 10E9/L (ref 1.6–8.3)
NEUTROPHILS NFR BLD AUTO: 78.1 %
NITRATE UR QL: NEGATIVE
OVALOCYTES BLD QL SMEAR: SLIGHT
PH UR STRIP: 5.5 PH (ref 5–7)
PLATELET # BLD AUTO: 177 10E9/L (ref 150–450)
PLATELET # BLD EST: ABNORMAL 10*3/UL
POIKILOCYTOSIS BLD QL SMEAR: SLIGHT
POTASSIUM SERPL-SCNC: 4.9 MMOL/L (ref 3.4–5.3)
RBC # BLD AUTO: 2.52 10E12/L (ref 4.4–5.9)
RBC #/AREA URNS AUTO: 0 /HPF (ref 0–2)
SODIUM SERPL-SCNC: 139 MMOL/L (ref 133–144)
SOURCE: ABNORMAL
SP GR UR STRIP: 1.02 (ref 1–1.03)
SPECIMEN SOURCE: ABNORMAL
TRANS CELLS #/AREA URNS HPF: <1 /HPF (ref 0–1)
UROBILINOGEN UR STRIP-MCNC: NORMAL MG/DL (ref 0–2)
WBC # BLD AUTO: 1.7 10E9/L (ref 4–11)
WBC #/AREA URNS AUTO: 0 /HPF (ref 0–5)

## 2020-09-25 PROCEDURE — 81001 URINALYSIS AUTO W/SCOPE: CPT | Performed by: STUDENT IN AN ORGANIZED HEALTH CARE EDUCATION/TRAINING PROGRAM

## 2020-09-25 PROCEDURE — 25000132 ZZH RX MED GY IP 250 OP 250 PS 637: Performed by: STUDENT IN AN ORGANIZED HEALTH CARE EDUCATION/TRAINING PROGRAM

## 2020-09-25 PROCEDURE — 25000131 ZZH RX MED GY IP 250 OP 636 PS 637: Performed by: STUDENT IN AN ORGANIZED HEALTH CARE EDUCATION/TRAINING PROGRAM

## 2020-09-25 PROCEDURE — 36415 COLL VENOUS BLD VENIPUNCTURE: CPT | Performed by: STUDENT IN AN ORGANIZED HEALTH CARE EDUCATION/TRAINING PROGRAM

## 2020-09-25 PROCEDURE — 99232 SBSQ HOSP IP/OBS MODERATE 35: CPT | Mod: GC | Performed by: INTERNAL MEDICINE

## 2020-09-25 PROCEDURE — 25000128 H RX IP 250 OP 636: Performed by: STUDENT IN AN ORGANIZED HEALTH CARE EDUCATION/TRAINING PROGRAM

## 2020-09-25 PROCEDURE — 21400000 ZZH R&B CCU UMMC

## 2020-09-25 PROCEDURE — 25000128 H RX IP 250 OP 636: Performed by: INTERNAL MEDICINE

## 2020-09-25 PROCEDURE — 85610 PROTHROMBIN TIME: CPT | Performed by: STUDENT IN AN ORGANIZED HEALTH CARE EDUCATION/TRAINING PROGRAM

## 2020-09-25 PROCEDURE — 25000132 ZZH RX MED GY IP 250 OP 250 PS 637: Performed by: INTERNAL MEDICINE

## 2020-09-25 PROCEDURE — 83735 ASSAY OF MAGNESIUM: CPT | Performed by: STUDENT IN AN ORGANIZED HEALTH CARE EDUCATION/TRAINING PROGRAM

## 2020-09-25 PROCEDURE — 86140 C-REACTIVE PROTEIN: CPT | Performed by: STUDENT IN AN ORGANIZED HEALTH CARE EDUCATION/TRAINING PROGRAM

## 2020-09-25 PROCEDURE — 85025 COMPLETE CBC W/AUTO DIFF WBC: CPT | Performed by: STUDENT IN AN ORGANIZED HEALTH CARE EDUCATION/TRAINING PROGRAM

## 2020-09-25 PROCEDURE — 00000146 ZZHCL STATISTIC GLUCOSE BY METER IP

## 2020-09-25 PROCEDURE — 25800030 ZZH RX IP 258 OP 636: Performed by: STUDENT IN AN ORGANIZED HEALTH CARE EDUCATION/TRAINING PROGRAM

## 2020-09-25 PROCEDURE — 80048 BASIC METABOLIC PNL TOTAL CA: CPT | Performed by: STUDENT IN AN ORGANIZED HEALTH CARE EDUCATION/TRAINING PROGRAM

## 2020-09-25 RX ORDER — CALCIUM GLUCONATE 94 MG/ML
2 INJECTION, SOLUTION INTRAVENOUS ONCE
Status: DISCONTINUED | OUTPATIENT
Start: 2020-09-25 | End: 2020-09-25

## 2020-09-25 RX ORDER — LIDOCAINE 40 MG/G
CREAM TOPICAL
Status: CANCELLED | OUTPATIENT
Start: 2020-09-25

## 2020-09-25 RX ORDER — CIPROFLOXACIN 500 MG/1
500 TABLET, FILM COATED ORAL EVERY 12 HOURS SCHEDULED
Status: DISCONTINUED | OUTPATIENT
Start: 2020-09-26 | End: 2020-10-05 | Stop reason: HOSPADM

## 2020-09-25 RX ADMIN — PANTOPRAZOLE SODIUM 40 MG: 40 TABLET, DELAYED RELEASE ORAL at 09:04

## 2020-09-25 RX ADMIN — INSULIN ASPART 4 UNITS: 100 INJECTION, SOLUTION INTRAVENOUS; SUBCUTANEOUS at 20:39

## 2020-09-25 RX ADMIN — HYDRALAZINE HYDROCHLORIDE 75 MG: 25 TABLET ORAL at 20:36

## 2020-09-25 RX ADMIN — PIPERACILLIN SODIUM AND TAZOBACTAM SODIUM 4.5 G: 4; .5 INJECTION, POWDER, LYOPHILIZED, FOR SOLUTION INTRAVENOUS at 04:24

## 2020-09-25 RX ADMIN — SULFAMETHOXAZOLE AND TRIMETHOPRIM 1 TABLET: 400; 80 TABLET ORAL at 09:02

## 2020-09-25 RX ADMIN — FONDAPARINUX SODIUM 7.5 MG: 7.5 INJECTION, SOLUTION SUBCUTANEOUS at 20:38

## 2020-09-25 RX ADMIN — CALCIUM GLUCONATE 2 G: 98 INJECTION, SOLUTION INTRAVENOUS at 09:02

## 2020-09-25 RX ADMIN — PREDNISONE 5 MG: 5 TABLET ORAL at 20:35

## 2020-09-25 RX ADMIN — ACETAMINOPHEN 975 MG: 325 TABLET, FILM COATED ORAL at 12:30

## 2020-09-25 RX ADMIN — INSULIN ASPART 1 UNITS: 100 INJECTION, SOLUTION INTRAVENOUS; SUBCUTANEOUS at 12:33

## 2020-09-25 RX ADMIN — PIPERACILLIN SODIUM AND TAZOBACTAM SODIUM 4.5 G: 4; .5 INJECTION, POWDER, LYOPHILIZED, FOR SOLUTION INTRAVENOUS at 16:57

## 2020-09-25 RX ADMIN — DOCUSATE SODIUM 50 MG AND SENNOSIDES 8.6 MG 2 TABLET: 8.6; 5 TABLET, FILM COATED ORAL at 20:35

## 2020-09-25 RX ADMIN — SODIUM CHLORIDE, POTASSIUM CHLORIDE, SODIUM LACTATE AND CALCIUM CHLORIDE 500 ML: 600; 310; 30; 20 INJECTION, SOLUTION INTRAVENOUS at 12:01

## 2020-09-25 RX ADMIN — Medication 1 TABLET: at 09:03

## 2020-09-25 RX ADMIN — INSULIN HUMAN 35 UNITS: 100 INJECTION, SUSPENSION SUBCUTANEOUS at 09:02

## 2020-09-25 RX ADMIN — PIPERACILLIN SODIUM AND TAZOBACTAM SODIUM 4.5 G: 4; .5 INJECTION, POWDER, LYOPHILIZED, FOR SOLUTION INTRAVENOUS at 23:07

## 2020-09-25 RX ADMIN — CLOTRIMAZOLE 1 LOZENGE: 10 LOZENGE ORAL at 20:35

## 2020-09-25 RX ADMIN — ROSUVASTATIN CALCIUM 10 MG: 10 TABLET, FILM COATED ORAL at 09:04

## 2020-09-25 RX ADMIN — HYDRALAZINE HYDROCHLORIDE 75 MG: 25 TABLET ORAL at 04:24

## 2020-09-25 RX ADMIN — ACETAMINOPHEN 975 MG: 325 TABLET, FILM COATED ORAL at 04:23

## 2020-09-25 RX ADMIN — INSULIN ASPART 3 UNITS: 100 INJECTION, SOLUTION INTRAVENOUS; SUBCUTANEOUS at 17:07

## 2020-09-25 RX ADMIN — PREDNISONE 10 MG: 10 TABLET ORAL at 09:04

## 2020-09-25 RX ADMIN — ACETAMINOPHEN 975 MG: 325 TABLET, FILM COATED ORAL at 20:35

## 2020-09-25 RX ADMIN — Medication 1 TABLET: at 16:57

## 2020-09-25 RX ADMIN — HYDRALAZINE HYDROCHLORIDE 75 MG: 25 TABLET ORAL at 12:30

## 2020-09-25 RX ADMIN — CLOTRIMAZOLE 1 LOZENGE: 10 LOZENGE ORAL at 12:30

## 2020-09-25 RX ADMIN — CLOTRIMAZOLE 1 LOZENGE: 10 LOZENGE ORAL at 16:58

## 2020-09-25 RX ADMIN — INSULIN ASPART 3 UNITS: 100 INJECTION, SOLUTION INTRAVENOUS; SUBCUTANEOUS at 23:11

## 2020-09-25 RX ADMIN — DOCUSATE SODIUM 50 MG AND SENNOSIDES 8.6 MG 2 TABLET: 8.6; 5 TABLET, FILM COATED ORAL at 09:03

## 2020-09-25 RX ADMIN — VALGANCICLOVIR 450 MG: 450 TABLET, FILM COATED ORAL at 09:02

## 2020-09-25 RX ADMIN — TAMSULOSIN HYDROCHLORIDE 0.4 MG: 0.4 CAPSULE ORAL at 09:02

## 2020-09-25 RX ADMIN — TACROLIMUS 3.5 MG: 1 CAPSULE ORAL at 16:57

## 2020-09-25 RX ADMIN — MAGNESIUM OXIDE 400 MG: 400 TABLET ORAL at 09:03

## 2020-09-25 RX ADMIN — ASPIRIN 81 MG: 81 TABLET, COATED ORAL at 09:02

## 2020-09-25 RX ADMIN — MAGNESIUM OXIDE 400 MG: 400 TABLET ORAL at 20:35

## 2020-09-25 RX ADMIN — PIPERACILLIN SODIUM AND TAZOBACTAM SODIUM 4.5 G: 4; .5 INJECTION, POWDER, LYOPHILIZED, FOR SOLUTION INTRAVENOUS at 10:16

## 2020-09-25 RX ADMIN — CLOTRIMAZOLE 1 LOZENGE: 10 LOZENGE ORAL at 09:03

## 2020-09-25 RX ADMIN — TACROLIMUS 4 MG: 1 CAPSULE ORAL at 09:02

## 2020-09-25 ASSESSMENT — ACTIVITIES OF DAILY LIVING (ADL)
ADLS_ACUITY_SCORE: 12

## 2020-09-25 NOTE — PLAN OF CARE
AOx4/Kinyarwanda speaking, VSS RA-2L NC, Tele: SR, up SBA - Pt intermittently will drop O2 sats down to low 80's - 2L NC put on.  Pt denies SOB at this time, denies CP/Numbness/Tingling.  +2 BLE edema, RUE +2 edema.  Wound care orders completed, IV antx given - continue to monitor

## 2020-09-25 NOTE — PLAN OF CARE
[id number]:  Neuro: A&Ox4. Calls appropriately. Sleeping between cares.  Activity: Up SBA.   Vitals: Afebrile. VSS on RA.   LDAS: L PIV at TKO between abx.   Cardiac: NSR, 70-80s.   Respiratory: Stable on RA. Denied SOB.      GI/: Urinal voiding with good output. BM x1.   Skin: L upper chest wound, CDI. Old CT site, CDI. RUE +2-3 edema, supported with a pillow. BLE +3 edema.   Pain: Denied.   Diet: Consistent carb diet, 2L FR.   Labs: Blood glucose- 158 and 104.   Plan: Continue to monitor and follow POC.

## 2020-09-25 NOTE — PROGRESS NOTES
[facility]    Transplant Infectious Diseases Inpatient Progress Note      Lucian Henderson . MRN# [id number]   YOB: 1955 Age: 64 year old   Date of Admission and time: 9/22/2020 12:31 PM             Recommendations: 1.   Stop Zosyn  2. Start Ciprofloxacin 500mg PO q12hrs for 2 weeks from day of debridement (9/23), last dose to be given PM of 10/7/20.  3. Transplant ID will sign off at this time, please don't hesitate to contact Transplant ID should further questions arise.        Summary of Presentation:   Transplants:  7/19/2020 (Heart), Postoperative day:  68     This patient is a 64 year old male with ICMP s/p OHT with basiliximab induction and TAC/MMF/prednisone maintenance.   Course complicated by DGF, HIT, RUE DVT.   Presented from cardiology clinic with wound infection.         Active Problems and Infectious Diseases Issues:   1. Surgical wound infection involving the left subclavian wound (prior ICD site) and the abdominal wound (prior drain site) with P aeruginosa.  The Coag Neg Staph is of no clinical value and does not need to be covered.   The S mitis on drainage only, less likely to be significant as tissue cultures with Pseudomonas only.      Underwent surgical debridement 9/23/20. Cultures with Pseudomonas aeruginosa (pansusceptible).      The chimney graft in the right subclavian area does not seem to be involved. But will follow on blood cx for the possibility of secondary seeding.   ID will follow with you.     2. EBV viremia  No signs or symptoms for PTLD.   Monitor EBV by PCR per your protocol but I would avoid checking EBV PCR now due to acute illness which may result in falsely elevated viral load.         Old Problems and Infectious Diseases Issues:   1. Donor with S salivarius positive blood cx; the patient received the usual perioperative ABx then ceftriaxone for total ABx of 7 days.   2. Donor sputum with P fluorescens that failed to grow  and Enterobacter spp. The recipient was not treated for these organisms.      Other Infectious Disease issues include:  - QTc 420 as of 9/22/202.   - PCP prophylaxis: Bactrim   - Serostatus: CMV D+/R+, EBV D+/R+, HSV1+/2-, VZV +  On VGCV for universal ppx.   - Immunization status: Too soon to be discussed.   - Gamma globulin status: not recently checked        Attestation:  I interviewed the patient and obtained history from the patient, the wife at the bedside, and by reviewing the patient's chart including outside records, microbiological data, and radiological data. All data are summarized in this note.    Meme Butterfield PA-C  Infectious Disease  Pager # [phone]    09/25/2020           Interim History of Present Illness:   Feeling well today. No nausea, vomiting, diarrhea, skin rashes. Sites are less tender and has not noted significant drainage. Dressings changed this morning.         History of Present Illness:   Transplants:  7/19/2020 (Heart), Postoperative day:  68     This patient is a 64 year old male with ICMP s/p OHT with basiliximab induction and TAC/MMF/prednisone maintenance. He had ICD removed from the left subclavian area and right subclavian IABP placed then removed with chimney graft in place.   Course complicated by DGF, HIT, RUE DVT.  The patient presented to the transplant clinic 9/22/20 and was found to have erythema, swelling, tenderness and warmth of the left subclavian and midline abdominal wounds. He was sent to [facility] where blood cx were obtained, wound swab of the left subclavian wound were sent. The patient was then started on zosyn and vancomycin.     ID consulted.     The patient stated that 5 days before admission, he started to develop pain in the involved areas for which he took tylenol. He denied fever or chills. He denied seeing drainage. Denied trauma to the areas.     Exposure History  Was born in [place], stated that he lived for 60 years in the [place], lived in [place] for  50-60 years.   He lives in [place], [place]. Lives with wife and one dog.   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   He stated he was tested for TB in the remote past and was negative.   He denied exposure to TB.   No institutionalization.   He travels to [place] frequently. His last travel outside of [place] was to [place] 2 years ago.   No significant outdoors activities.                 Review of Systems:      As mentioned in the HPI otherwise negative by reviewing constitutional symptoms, central and peripheral neurological systems, respiratory system, cardiac system, GI system,  system, musculoskeletal, skin, allergy, and lymphatics.                    Immunizations:     Immunization History   Administered Date(s) Administered     Influenza (IIV3) PF 10/05/2011, 10/15/2012     Influenza Vaccine IM > 6 months Valent IIV4 10/27/2015, 09/14/2016, 10/05/2017, 10/03/2018     Pneumococcal 23 valent 08/04/2009, 04/03/2015     TDAP Vaccine (Adacel) 08/04/2009            Allergies:     Allergies   Allergen Reactions     Heparin Heparin Induced Thrombocytopenia     HIT screen sent 7/18/2020. CORRINE confirmed positive             Medications:   Medications that Require Transfusion:     - MEDICATION INSTRUCTIONS -       - MEDICATION INSTRUCTIONS -       ACE/ARB/ARNI NOT PRESCRIBED       Scheduled Medications:     acetaminophen  975 mg Oral Q8H     aspirin  81 mg Oral Daily     calcium carbonate 600 mg-vitamin D 400 units  1 tablet Oral BID w/meals     clotrimazole  1 lozenge Buccal 4x Daily     fondaparinux ANTICOAGULANT  7.5 mg Subcutaneous Q24H     hydrALAZINE  75 mg Oral Q8H     insulin aspart  1-6 Units Subcutaneous Q4H     insulin isophane human  35 Units Subcutaneous QAM     lactated ringers  500 mL Intravenous Once     magnesium oxide  400 mg Oral BID     [Held by provider] mycophenolate  750 mg Oral BID     pantoprazole  40 mg Oral QAM AC     piperacillin-tazobactam  4.5 g Intravenous Q6H      predniSONE  10 mg Oral QAM AC     predniSONE  5 mg Oral QPM     rosuvastatin  10 mg Oral Daily     senna-docusate  1 tablet Oral BID    Or     senna-docusate  2 tablet Oral BID     sodium chloride (PF)  3 mL Intracatheter Q8H     sulfamethoxazole-trimethoprim  1 tablet Oral Daily     tacrolimus  3.5 mg Oral QPM     tacrolimus  4 mg Oral QAM     tamsulosin  0.4 mg Oral Daily     valGANciclovir  450 mg Oral Daily               Physical Exam:   Temp: 98.7  F (37.1  C) Temp src: Oral BP: 130/78 Pulse: 82   Resp: 18 SpO2: 97 % O2 Device: None (Room air)      Wt Readings from Last 4 Encounters:   09/25/20 83.1 kg (183 lb 4.8 oz)   08/28/20 76.9 kg (169 lb 8.5 oz)   08/17/20 79.8 kg (176 lb)   08/10/20 79.8 kg (176 lb)     Constitutional: awake, alert, cooperative, no apparent distress and appears at stated age, well nourished.   Extremities: +3 pitting edema of bilateral lower extremities, no ulcers, normal ROM of all joints, no swelling or erythema of any of joints and no tenderness to palpation.   Skin: No rashes. PIV site c/d/i. Chest and abdomen wounds repacked and dressed this AM- dressing left in place with no drainage on dressing.           Data:   No results found for: ACD4    Inflammatory Markers    Recent Labs   Lab Test 09/25/20  0549 09/23/20  0710 09/22/20  1256 08/25/20  0445 07/08/19  1021   CRP 16.0* 42.0* 16.0* <2.9 9.9       Immune Globulin Studies     Recent Labs   Lab Test 04/09/15  0721   IGG 1,310   IGM 38*          Metabolic Studies       Recent Labs   Lab Test 09/25/20  0549 09/24/20  0537 09/23/20  1147 09/23/20  0710 09/22/20  1313 09/22/20  1256 09/21/20  1017 09/18/20  0954  08/17/20  0847  07/22/20  1608  07/03/20  1559    137  --  136  --  133 133 134   < > 133   < > 140   < > 133   POTASSIUM 4.9 4.7  --  4.8  --  4.2 4.7 4.5   < > 4.8   < > 4.9   < > 4.4   CHLORIDE 111* 108  --  108  --  104 104 107   < > 102   < > 110*   < > 102   CO2 23 21  --  21  --  22 20 17*   < > 26   < >  25   < > 25   ANIONGAP 5 8  --  7  --  7 9 10   < > 6   < > 5   < > 6   BUN 36* 37*  --  36*  --  43* 45* 40*   < > 67*   < > 40*   < > 33*   CR 1.74* 1.49*  --  1.26*  --  1.50* 1.57* 1.17   < > 1.67*   < > 1.47*   < > 1.65*   GFRESTIMATED 40* 48*  --  59* 47* 48* 45* 64   < > 42*   < > 49*   < > 42*   GLC 97 141*  --  203*  --  187* 212* 252*   < > 391*   < > 134*   < > 229*   A1C  --   --   --   --   --   --   --   --   --   --   --   --   --  8.2*   BRYANNA 7.8* 8.1*  --  8.2*  --  8.0* 7.9* 8.0*   < > 8.3*   < > 8.1*   < > 8.7   PHOS  --   --   --   --   --   --  1.8* 1.5*   < > 3.5   < > 2.8   < >  --    MAG 2.1 2.0  --  1.8  --  1.9 1.1* 1.5*   < > 1.9   < > 2.3   < > 2.2   LACT  --   --  1.3  --   --   --   --   --   --   --   --  1.0   < >  --    CKT  --   --   --   --   --   --   --   --   --  76  --   --   --   --     < > = values in this interval not displayed.       Hepatic Studies    Recent Labs   Lab Test 09/22/20  1256 09/14/20  1153 08/31/20  0856 08/25/20  1705 08/24/20  2026 08/17/20  0847 08/13/20  0713  07/08/19  1021   BILITOTAL 0.3 0.3 0.4  --  0.5 0.5 0.6   < > 0.6   ALKPHOS 93 100 117  --  124 141 91   < > 126   ALBUMIN 2.5* 2.7* 2.7*  --  2.7* 2.7* 2.6*   < > 3.4   AST 17 19 13  --  12 13 16   < > 14   ALT 26 26 31  --  28 29 27   < > 20   LDH  --   --   --  433*  --   --   --   --  174    < > = values in this interval not displayed.       Pancreatitis testing    Recent Labs   Lab Test 08/17/20  0847 07/19/20  1613 07/08/19  1021 08/16/18  0834 07/05/17  0732 07/06/16  0717 06/27/15  0755   AMYLASE  --  36  --   --   --   --   --    TRIG 82  --  181* 297* 226* 225* 99       Hematology Studies      Recent Labs   Lab Test 09/25/20  0549 09/24/20  0537 09/23/20  0710 09/22/20  2242 09/22/20  1256 09/21/20  1017  08/24/20  2026  07/26/20  0545   WBC 1.7* 1.8* 2.4* 2.6* 3.0* 3.1*   < > 4.9   < > 9.2   ANEU 1.3* 1.5*  --  2.4 2.8  --   --  4.2  --  8.5*   ALYM 0.2* 0.2*  --  0.0* 0.0*  --   --  0.3*   --  0.1*   ELAN 0.1 0.1  --  0.1 0.2  --   --  0.2  --  0.3   AEOS 0.0 0.0  --  0.0 0.0  --   --  0.0  --  0.0   HGB 7.4* 7.4* 7.9* 6.8* 7.2* 7.2*   < > 6.8*   < > 8.2*   HCT 24.8* 24.6* 25.5* 22.2* 23.5* 22.9*   < > 21.9*   < > 25.8*    166 179 169 187 179   < > 193   < > 216    < > = values in this interval not displayed.       Clotting Studies    Recent Labs   Lab Test 09/25/20  0549 09/24/20  0537 09/23/20  0710 09/22/20  1256  08/03/20  0559 08/02/20  0641 08/01/20  0702   INR 1.16* 1.18* 1.16* 1.13   < >  --   --   --    PTT  --   --   --  31  --  29 29 25    < > = values in this interval not displayed.       Arterial Blood Gas Testing    Recent Labs   Lab Test 07/24/20  0829 07/24/20  0413 07/23/20  2109 07/23/20  0404 07/22/20  2122 07/22/20  1608 07/22/20  1206 07/22/20  0845   PH  --   --   --  7.47* 7.44 7.44 7.43 7.41   PCO2  --   --   --  37 38 39 32* 34*   PO2  --   --   --  104 111* 80 103 123*   HCO3  --   --   --  27 26 27 22 21   O2PER 1.0L 1.5L 1.5L 40  40 40 40  40 40.0 40.0  40.0        Urine Studies     Recent Labs   Lab Test 09/22/20  1448 08/25/20  0731 07/19/20  1930 07/17/20  1402 07/08/19  1017   URINEPH 5.0 5.0 6.0 5.0 6.0   NITRITE Negative Negative Negative Negative Negative   LEUKEST Negative Negative Negative Large* Large*   WBCU 1 0 <1 13* >182*       Tacrolimus levels    Invalid input(s): TACROLIMUS, TAC, TACR  Transplant Immunosuppression Labs Latest Ref Rng & Units 9/25/2020 9/24/2020 9/23/2020 9/22/2020 9/22/2020   Tacro Level 5.0 - 15.0 ug/L - 8.4 - - -   Tacro Level - - Not Provided - - -   Creat 0.66 - 1.25 mg/dL 1.74(H) 1.49(H) 1.26(H) - 1.5(H)   BUN 7 - 30 mg/dL 36(H) 37(H) 36(H) - -   WBC 4.0 - 11.0 10e9/L 1.7(L) 1.8(L) 2.4(L) 2.6(L) -   Neutrophil % 78.1 83.4 - 94.0 -   ANEU 1.6 - 8.3 10e9/L 1.3(L) 1.5(L) - 2.4 -       Microbiology:  Blood cx negative   Wound cx swab with P aeruginosa and S mitis from the abdominal wound and P aeruginosa and Coag Neg Staph from  the subclavian wound.   Surgical wound cx only with P aeruginosa.   Last check of C difficile  No results found for: CDBPCT    Virology:  CMV viral loads  No results found for: 58268, 91370, 60984, 19413, CMVQAL  CMV viral loads    Recent Labs   Lab Test 08/17/20  0847   CSPEC Plasma, EDTA anticoagulant   CMVLOG Not Calculated       CMV viral loads    Log IU/mL of CMVQNT   Date Value Ref Range Status   08/17/2020 Not Calculated <2.1 [Log_IU]/mL Final       CMV resistance testing  No lab results found.  No results found for: CMVCID, CMVFOS, CMVGAN     No results found for: H6RES    EBV DNA Copies/mL   Date Value Ref Range Status   08/17/2020 766 (A) EBVNEG^EBV DNA Not Detected [Copies]/mL Final       CMV Antibody IgG   Date Value Ref Range Status   07/19/2020 >8.0 (H) 0.0 - 0.8 AI Final     Comment:     Positive  Antibody index (AI) values reflect qualitative changes in antibody   concentration that cannot be directly associated with clinical condition or   disease state.     07/08/2019 >8.0 (H) 0.0 - 0.8 AI Final     Comment:     Positive  Antibody index (AI) values reflect qualitative changes in antibody   concentration that cannot be directly associated with clinical condition or   disease state.         Toxoplasma Antibody IgG   Date Value Ref Range Status   07/08/2019 <3.0 0.0 - 7.1 IU/mL Final     Comment:     Negative- Absence of detectable Toxoplasma gondii IgG antibodies. A negative   result does not rule out acute infection.  The magnitude of the measured result is not indicative of the amount of   antibody present. The concentrations of anti-Toxoplasma gondii IgG in a given   specimen determined with assays from different manufacturers can vary due to   differences in assay methods and reagent specificity.           Imaging:  CT c/a/p 9/22/2020   IMPRESSION:   In this patient who is approximately 2 months postop orthotopic heart  transplant:   1. Left supraclavicular subcutaneous soft tissue thickening and  fat  stranding is likely an infectious/inflammatory process originating  from the patient's prior left implantable cardiac defibrillator chest  wall pocket. No appreciable associated drainable fluid collection,  though this process is incompletely visualized, extending above the  field-of-view.  2. Simple appearing anterior mediastinal/pericardial fluid collection  with thin rim enhancement but no significant associated inflammatory  fat stranding, likely a postoperative serous collection. There is no  evidence of sternal wound involvement or communication with the  presumed left supraclavicular fossa cellulitis.   3. Small region of isolated skin/subcutaneous soft tissue thickening  in the epigastric region, likely corresponding to the site of a prior  pericardial drain and suggestive of cellulitis. No drainable fluid  collection at this location.   4. Retained vascular access cannula in the right supraclavicular fossa  with an adjacent elongated subclavian artery pseudoaneurysm, contained  by surrounded by soft tissue thickening.  4. Small left and trace right pleural effusions.  5. Cholelithiasis without cholecystitis.      Meme Butterfield PA-C  Infectious Disease  Pager # [phone]    09/25/2020

## 2020-09-25 NOTE — PROGRESS NOTES
Cardiovascular Surgery Progress Note  09/25/2020  Surgeon: Dr. Huertas           Assessment and Plan:     Lucian Henderson Sr. is a 64 year old male with a PMH of end-stage ischemic cardiomyopathy, CAD s/p CABG 2008, DMT2, who was admitted to [facility] 07/03 for heart failure exacerbation with cardiogenic shock, underwent right subclavian IABP insertion 07/16 with Dr. Sparrow, and then heart transplant 07/20 with Dr. Griselli. 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 Now on Fondaparinux. Readmitted to [facility] 09/22 with hyperglycemia, CV surgery consulted for left former ICD pocket infection and former chest tube site infection. Underwent I&D in OR 09/23 with Dr. Huertas.       Former ICD pocket infection (left chest)  Former chest tube site infection (upper abdomen)  - S/p I&D 09/23 in OR with Dr. Huertas  - Continue TID packing change (RN to do)  - Transplant ID following. Abdominal wound culture 09/23 growing strep mitis and pseudomonas. Left chest former ICD pocket culture 09/23 growing pseudomonas. Continues on zosyn  - Of note, on 09/23 vascular surgery reviewed the CT imaging of right subclavian IABP graft site given question of right subclavian pseudoaneurysm vs localized fluid collection. Wound currently appears well healing, without active infection, and vascular surgery felt it did not warrant wound exploration at this time.  - Patient may benefit from wound nurse consult and wound vac placement.       Staff surgeons have been informed of changes through both verbal and written communication.        Claudia Chapman PA-C  Cardiothoracic Surgery  Pager [phone]  September 25, 2020            Interval History:   Denies complaint. Had pain with dressing change, but otherwise pain controlled         Physical Exam:   Blood pressure (!) 140/80, pulse 88, temperature 98.4  F (36.9  C), temperature source Oral, resp. rate 18, weight 83.1 kg (183 lb 4.8 oz), SpO2 98 %.  Vitals:     09/23/20 0101 09/24/20 0508 09/25/20 0613   Weight: 84.1 kg (185 lb 8 oz) 82.2 kg (181 lb 3.2 oz) 83.1 kg (183 lb 4.8 oz)        Incision: sternal incision: well healed.  Left subclavian former ICD pocket- packing removed, gentle debridement performed of left subclavian wound, some cloudy drainage within wound when packing removed. Tissue around wound indurated, no erythema, no bleeding. Upper abdomen former chest tube site- with packing in place and no bleeding.              Data:        Labs:  BMP  Recent Labs   Lab 09/25/20  0549 09/24/20  0537 09/23/20  0710 09/22/20  1256    137 136 133   POTASSIUM 4.9 4.7 4.8 4.2   CHLORIDE 111* 108 108 104   BRYANNA 7.8* 8.1* 8.2* 8.0*   CO2 23 21 21 22   BUN 36* 37* 36* 43*   CR 1.74* 1.49* 1.26* 1.50*   GLC 97 141* 203* 187*     CBC  Recent Labs   Lab 09/25/20  0549 09/24/20  0537 09/23/20  0710 09/22/20  2242   WBC 1.7* 1.8* 2.4* 2.6*   RBC 2.52* 2.47* 2.62* 2.22*   HGB 7.4* 7.4* 7.9* 6.8*   HCT 24.8* 24.6* 25.5* 22.2*   MCV 98 100 97 100   MCH 29.4 30.0 30.2 30.6   MCHC 29.8* 30.1* 31.0* 30.6*   RDW 19.7* 19.8* 19.1* 20.2*    166 179 169     INR  Recent Labs   Lab 09/25/20  0549 09/24/20  0537 09/23/20  0710 09/22/20  1256   INR 1.16* 1.18* 1.16* 1.13      Liver Function Studies -   Recent Labs   Lab Test 09/22/20  1256   PROTTOTAL 5.5*   ALBUMIN 2.5*   BILITOTAL 0.3   ALKPHOS 93   AST 17   ALT 26     GLUCOSE:   Recent Labs   Lab 09/25/20  1706 09/25/20  1233 09/25/20  0549 09/25/20  0429 09/24/20  2349 09/24/20  1951 09/24/20  1550  09/24/20  0537  09/23/20  0710  09/22/20  1256 09/21/20  1017   GLC  --   --  97  --   --   --   --   --  141*  --  203*  --  187* 212*   * 166*  --  104* 158* 195* 247*   < >  --    < >  --    < >  --   --     < > = values in this interval not displayed.

## 2020-09-25 NOTE — PLAN OF CARE
OT 6C. Cancel. Pt declining therapy upon AM attempt despite encouragement. Will reschedule per POC.

## 2020-09-25 NOTE — PROGRESS NOTES
Cardiology Progress Note  Lucian Henderson Sr. MRN: [id number]  Age: 64 year old, : 1955  09/25/20    Assessment & Plan   Lucian Henderson Sr. is a 64 year old male admitted on 2020 for left subclavian and abdominal surgical wound infections growing P. Aeruginosa s/p I&D.     Changes Today:  - last day of zosyn  - start ciprofloxacin 500 mg BID, EOT 10/7     left subclavian surgical wound infection  Abdominal wound surgical wound infection  - Blood culture (2020): NGTD  - wound culture:    - Abdominal wound culture  growing strep mitis and pseudomonas.    - Left chest former ICD pocket culture  growing pseudomonas.  - CRP trend: 16.0 ()->42.0 ()  - last day of zosyn  - start ciprofloxacin 500 mg BID, EOT 10/7  - CV Surgery consulted              > right subclavian site appears healed, no planned intervention     ICM s/p OHT  Leukopenia, moderate neutropenia  Most recent biopsy (2020) was negative.  -Immunsuppression:              - continue Prednisone 10 mg am, 5 mg pm              - Tacrolimus 4 mg am, 3.5 mg pm; next level               - Hold Mycophenolate 750 mg BID in setting of infection and leukopenia/neutropenia              - trend tacro, goal 8-10 in setting of infection  Prophylaxis             - Valgancyclovir 450 mg every 48 hours             - Bactrim (400-80 mg) 1 daily              - Clotrimazole 10 mg lozenge  -Hydralazine 75mg Q8H    DAYA on CKD, non-oliguric  Cr today 1.74, baseline unclear. Is making urine. UA unremarkable. Low suspicion for obstruction. I/O suggestive of pre-renal dehydration.  - LR bolus 500 ml      Hypomagnesemia, resolved              - Keep Mg > 2.0                          >Replacement protocol              - continue magnesium oxide 400 mg bid     Chronic normocytic anemia  possibly anemia of chronic disease. Iron studies: elevated ferritin (447 ng/ml), iron and iron binding cap  are wnl. Hold-off on transfusions for now given risk of rejection.               - continue to monitor.      T2DM  - continue pta Humulin 35 units daily  - MDSSI     Constipation history  - Miralax 17 g daily  - Senna-docusate 8.6-50 mg bid     BPH  - Continue PTA Tamsulosin 0.4 mg daily     h/o DVT  h/o HIT  - start PTA Fondaparinux 7.5 mg daily.     Diet: Carb-restricted diet.  DVT Prophylaxis: resume Fondaparinux 7.5 mg daily.  GI prophylaxis: Pantoprazole 40 mg daily  Osteoporosis: Calcium carbonate -Vitamin D 600-400mg daily  CAV: Rosuvastatin 10 mg daily  Calderon Catheter: not present  Code Status: Full code  Fluids: None  Lines: PIV    The patient's care was discussed with the Attending Physician, Dr. Glory Johnson MD  Internal Medicine, PGY-2  [facility], [place]  Pager: [phone]    Interval History   NAEO. No acute concerns. Tolerating diet. Denies f/c, CP, drainage at the site, abdominal pain, N/V.    Physical Exam   Temp: 98.7  F (37.1  C) Temp src: Oral BP: 130/78 Pulse: 82   Resp: 18 SpO2: 97 % O2 Device: None (Room air)    Vitals:    09/23/20 0101 09/24/20 0508 09/25/20 0613   Weight: 84.1 kg (185 lb 8 oz) 82.2 kg (181 lb 3.2 oz) 83.1 kg (183 lb 4.8 oz)     Vital Signs with Ranges  Temp:  [98.1  F (36.7  C)-98.9  F (37.2  C)] 98.7  F (37.1  C)  Pulse:  [79-93] 82  Resp:  [16-18] 18  BP: (106-142)/(56-78) 130/78  SpO2:  [94 %-98 %] 97 %  I/O last 3 completed shifts:  In: 850 [P.O.:600; I.V.:250]  Out: 1330 [Urine:1330]  Constitutional: awake, alert, cooperative, no apparent distress  HENT: PERRL, EOM grossly intact, sclera anicteric, oral pharynx with moist mucous membranes  Respiratory: non-labored respirations on room-air, CTAB, no crackles or wheezing, no cough  Cardiovascular: RRR, no murmurs, no JVD  GI: soft, non-distended, non-tender  Skin: open left clavicular wound c/d/i with packing, open abdominal wound c/d/i with packing, right subclavian  incision healed  Neurologic: Cranial nerves II-XII are grossly intact, moving all extremities equally and independently      Medications     - MEDICATION INSTRUCTIONS -       - MEDICATION INSTRUCTIONS -       ACE/ARB/ARNI NOT PRESCRIBED         acetaminophen  975 mg Oral Q8H     aspirin  81 mg Oral Daily     calcium carbonate 600 mg-vitamin D 400 units  1 tablet Oral BID w/meals     clotrimazole  1 lozenge Buccal 4x Daily     fondaparinux ANTICOAGULANT  7.5 mg Subcutaneous Q24H     hydrALAZINE  75 mg Oral Q8H     insulin aspart  1-6 Units Subcutaneous Q4H     insulin isophane human  35 Units Subcutaneous QAM     magnesium oxide  400 mg Oral BID     [Held by provider] mycophenolate  750 mg Oral BID     pantoprazole  40 mg Oral QAM AC     piperacillin-tazobactam  4.5 g Intravenous Q6H     predniSONE  10 mg Oral QAM AC     predniSONE  5 mg Oral QPM     rosuvastatin  10 mg Oral Daily     senna-docusate  1 tablet Oral BID    Or     senna-docusate  2 tablet Oral BID     sodium chloride (PF)  3 mL Intracatheter Q8H     sulfamethoxazole-trimethoprim  1 tablet Oral Daily     tacrolimus  3.5 mg Oral QPM     tacrolimus  4 mg Oral QAM     tamsulosin  0.4 mg Oral Daily     valGANciclovir  450 mg Oral Daily       Data   Recent Labs   Lab 09/25/20  0549 09/24/20  0537 09/23/20  0710  09/22/20  1256   WBC 1.7* 1.8* 2.4*   < > 3.0*   HGB 7.4* 7.4* 7.9*   < > 7.2*   MCV 98 100 97   < > 100    166 179   < > 187   INR 1.16* 1.18* 1.16*  --  1.13    137 136  --  133   POTASSIUM 4.9 4.7 4.8  --  4.2   CHLORIDE 111* 108 108  --  104   CO2 23 21 21  --  22   BUN 36* 37* 36*  --  43*   CR 1.74* 1.49* 1.26*  --  1.50*   ANIONGAP 5 8 7  --  7   BRYANNA 7.8* 8.1* 8.2*  --  8.0*   GLC 97 141* 203*  --  187*   ALBUMIN  --   --   --   --  2.5*   PROTTOTAL  --   --   --   --  5.5*   BILITOTAL  --   --   --   --  0.3   ALKPHOS  --   --   --   --  93   ALT  --   --   --   --  26   AST  --   --   --   --  17    < > = values in this interval  not displayed.       No results found for this or any previous visit (from the past 24 hour(s)).

## 2020-09-25 NOTE — PLAN OF CARE
D: Pt who presents this admission with purulent discharge from L clavicular and mid abdominal incisio. Pt now s/p I&D of both incisions, done by Dr. Huertas on 9/23/20. Hx of OHT (7/19/20), DM type II, CKD stage 3, RUE DVT c/b HIT, and HTN     I/A: Pt alert and oriented x4. Pt sinus rhythm with HRs 80-90s. On RA with O2 sats >92%. Pt denies any pain, lightheadedness or dizziness. L upper supraclavicular and abdomen incision dressings changed x2 this shift. Incisions WDL with scant serosang drainage. Per CVTS provider, dressings to be changed 3x daily or PRN. Incisions to be cleansed with microklenz and pat dry, then packed with packing strips and covered with dry gauze. IV abx continued at scheduled. Pt appetite good, denies nausea. BG checks this shift 247 and 195, sliding scale insulin given 2x. Last BM today. Pt voiding. Up with SBA-independently in room. Magnesium this morning was 2.0, replacement given as scheduled.      P: Continue to monitor and assess pt with every encounter. Notify Cards 2 with any changes or concerns.

## 2020-09-26 LAB
ANION GAP SERPL CALCULATED.3IONS-SCNC: 6 MMOL/L (ref 3–14)
ANISOCYTOSIS BLD QL SMEAR: ABNORMAL
ANISOCYTOSIS BLD QL SMEAR: ABNORMAL
BASOPHILS # BLD AUTO: 0 10E9/L (ref 0–0.2)
BASOPHILS # BLD AUTO: 0 10E9/L (ref 0–0.2)
BASOPHILS NFR BLD AUTO: 0 %
BASOPHILS NFR BLD AUTO: 0 %
BUN SERPL-MCNC: 41 MG/DL (ref 7–30)
CALCIUM SERPL-MCNC: 8.2 MG/DL (ref 8.5–10.1)
CHLORIDE SERPL-SCNC: 113 MMOL/L (ref 94–109)
CO2 SERPL-SCNC: 22 MMOL/L (ref 20–32)
CREAT SERPL-MCNC: 1.84 MG/DL (ref 0.66–1.25)
DIFFERENTIAL METHOD BLD: ABNORMAL
DIFFERENTIAL METHOD BLD: ABNORMAL
ELLIPTOCYTES BLD QL SMEAR: SLIGHT
EOSINOPHIL # BLD AUTO: 0 10E9/L (ref 0–0.7)
EOSINOPHIL # BLD AUTO: 0 10E9/L (ref 0–0.7)
EOSINOPHIL NFR BLD AUTO: 0 %
EOSINOPHIL NFR BLD AUTO: 0 %
ERYTHROCYTE [DISTWIDTH] IN BLOOD BY AUTOMATED COUNT: 19.9 % (ref 10–15)
ERYTHROCYTE [DISTWIDTH] IN BLOOD BY AUTOMATED COUNT: 19.9 % (ref 10–15)
GFR SERPL CREATININE-BSD FRML MDRD: 37 ML/MIN/{1.73_M2}
GLUCOSE BLDC GLUCOMTR-MCNC: 179 MG/DL (ref 70–99)
GLUCOSE BLDC GLUCOMTR-MCNC: 221 MG/DL (ref 70–99)
GLUCOSE BLDC GLUCOMTR-MCNC: 261 MG/DL (ref 70–99)
GLUCOSE BLDC GLUCOMTR-MCNC: 289 MG/DL (ref 70–99)
GLUCOSE SERPL-MCNC: 170 MG/DL (ref 70–99)
HCT VFR BLD AUTO: 24.7 % (ref 40–53)
HCT VFR BLD AUTO: 24.7 % (ref 40–53)
HGB BLD-MCNC: 7.5 G/DL (ref 13.3–17.7)
HGB BLD-MCNC: 7.7 G/DL (ref 13.3–17.7)
INR PPP: 1.2 (ref 0.86–1.14)
LYMPHOCYTES # BLD AUTO: 0.2 10E9/L (ref 0.8–5.3)
LYMPHOCYTES # BLD AUTO: 0.2 10E9/L (ref 0.8–5.3)
LYMPHOCYTES NFR BLD AUTO: 14.2 %
LYMPHOCYTES NFR BLD AUTO: 9.8 %
MAGNESIUM SERPL-MCNC: 2 MG/DL (ref 1.6–2.3)
MCH RBC QN AUTO: 30.5 PG (ref 26.5–33)
MCH RBC QN AUTO: 31.3 PG (ref 26.5–33)
MCHC RBC AUTO-ENTMCNC: 30.4 G/DL (ref 31.5–36.5)
MCHC RBC AUTO-ENTMCNC: 31.2 G/DL (ref 31.5–36.5)
MCV RBC AUTO: 100 FL (ref 78–100)
MCV RBC AUTO: 100 FL (ref 78–100)
METAMYELOCYTES # BLD: 0 10E9/L
METAMYELOCYTES NFR BLD MANUAL: 0.9 %
MONOCYTES # BLD AUTO: 0 10E9/L (ref 0–1.3)
MONOCYTES # BLD AUTO: 0.1 10E9/L (ref 0–1.3)
MONOCYTES NFR BLD AUTO: 2.7 %
MONOCYTES NFR BLD AUTO: 8 %
NEUTROPHILS # BLD AUTO: 1.3 10E9/L (ref 1.6–8.3)
NEUTROPHILS # BLD AUTO: 1.4 10E9/L (ref 1.6–8.3)
NEUTROPHILS NFR BLD AUTO: 82.2 %
NEUTROPHILS NFR BLD AUTO: 82.2 %
NRBC # BLD AUTO: 0 10*3/UL
NRBC BLD AUTO-RTO: 1 /100
OVALOCYTES BLD QL SMEAR: ABNORMAL
PLATELET # BLD AUTO: 192 10E9/L (ref 150–450)
PLATELET # BLD AUTO: 195 10E9/L (ref 150–450)
PLATELET # BLD EST: ABNORMAL 10*3/UL
PLATELET # BLD EST: ABNORMAL 10*3/UL
POIKILOCYTOSIS BLD QL SMEAR: ABNORMAL
POIKILOCYTOSIS BLD QL SMEAR: ABNORMAL
POTASSIUM SERPL-SCNC: 5 MMOL/L (ref 3.4–5.3)
RBC # BLD AUTO: 2.46 10E12/L (ref 4.4–5.9)
RBC # BLD AUTO: 2.46 10E12/L (ref 4.4–5.9)
RETICS # AUTO: 43.1 10E9/L (ref 25–95)
RETICS/RBC NFR AUTO: 1.8 % (ref 0.5–2)
SODIUM SERPL-SCNC: 142 MMOL/L (ref 133–144)
TACROLIMUS BLD-MCNC: 13 UG/L (ref 5–15)
TME LAST DOSE: NORMAL H
WBC # BLD AUTO: 1.6 10E9/L (ref 4–11)
WBC # BLD AUTO: 1.7 10E9/L (ref 4–11)

## 2020-09-26 PROCEDURE — 00000146 ZZHCL STATISTIC GLUCOSE BY METER IP

## 2020-09-26 PROCEDURE — 25000132 ZZH RX MED GY IP 250 OP 250 PS 637: Performed by: INTERNAL MEDICINE

## 2020-09-26 PROCEDURE — 85610 PROTHROMBIN TIME: CPT | Performed by: STUDENT IN AN ORGANIZED HEALTH CARE EDUCATION/TRAINING PROGRAM

## 2020-09-26 PROCEDURE — 99232 SBSQ HOSP IP/OBS MODERATE 35: CPT | Mod: GC | Performed by: INTERNAL MEDICINE

## 2020-09-26 PROCEDURE — 36415 COLL VENOUS BLD VENIPUNCTURE: CPT | Performed by: STUDENT IN AN ORGANIZED HEALTH CARE EDUCATION/TRAINING PROGRAM

## 2020-09-26 PROCEDURE — 25000125 ZZHC RX 250: Performed by: NURSE PRACTITIONER

## 2020-09-26 PROCEDURE — 85045 AUTOMATED RETICULOCYTE COUNT: CPT | Performed by: STUDENT IN AN ORGANIZED HEALTH CARE EDUCATION/TRAINING PROGRAM

## 2020-09-26 PROCEDURE — 25000128 H RX IP 250 OP 636: Performed by: INTERNAL MEDICINE

## 2020-09-26 PROCEDURE — 40000611 ZZHCL STATISTIC MORPHOLOGY W/INTERP HEMEPATH TC 85060: Performed by: STUDENT IN AN ORGANIZED HEALTH CARE EDUCATION/TRAINING PROGRAM

## 2020-09-26 PROCEDURE — 83735 ASSAY OF MAGNESIUM: CPT | Performed by: STUDENT IN AN ORGANIZED HEALTH CARE EDUCATION/TRAINING PROGRAM

## 2020-09-26 PROCEDURE — 25000132 ZZH RX MED GY IP 250 OP 250 PS 637: Performed by: STUDENT IN AN ORGANIZED HEALTH CARE EDUCATION/TRAINING PROGRAM

## 2020-09-26 PROCEDURE — 86352 CELL FUNCTION ASSAY W/STIM: CPT | Performed by: STUDENT IN AN ORGANIZED HEALTH CARE EDUCATION/TRAINING PROGRAM

## 2020-09-26 PROCEDURE — 80048 BASIC METABOLIC PNL TOTAL CA: CPT | Performed by: STUDENT IN AN ORGANIZED HEALTH CARE EDUCATION/TRAINING PROGRAM

## 2020-09-26 PROCEDURE — 25000128 H RX IP 250 OP 636: Performed by: STUDENT IN AN ORGANIZED HEALTH CARE EDUCATION/TRAINING PROGRAM

## 2020-09-26 PROCEDURE — 21400000 ZZH R&B CCU UMMC

## 2020-09-26 PROCEDURE — 80197 ASSAY OF TACROLIMUS: CPT | Performed by: INTERNAL MEDICINE

## 2020-09-26 PROCEDURE — 25000131 ZZH RX MED GY IP 250 OP 636 PS 637: Performed by: STUDENT IN AN ORGANIZED HEALTH CARE EDUCATION/TRAINING PROGRAM

## 2020-09-26 PROCEDURE — 85025 COMPLETE CBC W/AUTO DIFF WBC: CPT | Performed by: STUDENT IN AN ORGANIZED HEALTH CARE EDUCATION/TRAINING PROGRAM

## 2020-09-26 RX ORDER — TACROLIMUS 1 MG/1
3 CAPSULE ORAL
Status: DISCONTINUED | OUTPATIENT
Start: 2020-09-27 | End: 2020-10-05 | Stop reason: HOSPADM

## 2020-09-26 RX ADMIN — Medication 1 TABLET: at 17:08

## 2020-09-26 RX ADMIN — SILVER NITRATE APPLICATORS 1 APPLICATOR: 25; 75 STICK TOPICAL at 11:21

## 2020-09-26 RX ADMIN — INSULIN HUMAN 35 UNITS: 100 INJECTION, SUSPENSION SUBCUTANEOUS at 08:16

## 2020-09-26 RX ADMIN — CIPROFLOXACIN HYDROCHLORIDE 500 MG: 500 TABLET, FILM COATED ORAL at 08:15

## 2020-09-26 RX ADMIN — ROSUVASTATIN CALCIUM 10 MG: 10 TABLET, FILM COATED ORAL at 08:15

## 2020-09-26 RX ADMIN — CLOTRIMAZOLE 1 LOZENGE: 10 LOZENGE ORAL at 17:08

## 2020-09-26 RX ADMIN — INSULIN ASPART 1 UNITS: 100 INJECTION, SOLUTION INTRAVENOUS; SUBCUTANEOUS at 08:16

## 2020-09-26 RX ADMIN — PREDNISONE 10 MG: 10 TABLET ORAL at 08:15

## 2020-09-26 RX ADMIN — CLOTRIMAZOLE 1 LOZENGE: 10 LOZENGE ORAL at 11:04

## 2020-09-26 RX ADMIN — HYDRALAZINE HYDROCHLORIDE 75 MG: 25 TABLET ORAL at 11:04

## 2020-09-26 RX ADMIN — INSULIN ASPART 1 UNITS: 100 INJECTION, SOLUTION INTRAVENOUS; SUBCUTANEOUS at 03:52

## 2020-09-26 RX ADMIN — INSULIN ASPART 3 UNITS: 100 INJECTION, SOLUTION INTRAVENOUS; SUBCUTANEOUS at 14:06

## 2020-09-26 RX ADMIN — ACETAMINOPHEN 975 MG: 325 TABLET, FILM COATED ORAL at 11:04

## 2020-09-26 RX ADMIN — FONDAPARINUX SODIUM 7.5 MG: 7.5 INJECTION, SOLUTION SUBCUTANEOUS at 20:45

## 2020-09-26 RX ADMIN — Medication 1 TABLET: at 08:15

## 2020-09-26 RX ADMIN — TACROLIMUS 4 MG: 1 CAPSULE ORAL at 08:14

## 2020-09-26 RX ADMIN — VALGANCICLOVIR 450 MG: 450 TABLET, FILM COATED ORAL at 08:15

## 2020-09-26 RX ADMIN — CIPROFLOXACIN HYDROCHLORIDE 500 MG: 500 TABLET, FILM COATED ORAL at 20:45

## 2020-09-26 RX ADMIN — TACROLIMUS 3.5 MG: 1 CAPSULE ORAL at 17:08

## 2020-09-26 RX ADMIN — HYDRALAZINE HYDROCHLORIDE 75 MG: 25 TABLET ORAL at 03:50

## 2020-09-26 RX ADMIN — SULFAMETHOXAZOLE AND TRIMETHOPRIM 1 TABLET: 400; 80 TABLET ORAL at 08:15

## 2020-09-26 RX ADMIN — MAGNESIUM SULFATE 2 G: 2 INJECTION INTRAVENOUS at 08:14

## 2020-09-26 RX ADMIN — MAGNESIUM OXIDE 400 MG: 400 TABLET ORAL at 20:46

## 2020-09-26 RX ADMIN — INSULIN ASPART 2 UNITS: 100 INJECTION, SOLUTION INTRAVENOUS; SUBCUTANEOUS at 20:50

## 2020-09-26 RX ADMIN — PREDNISONE 5 MG: 5 TABLET ORAL at 20:46

## 2020-09-26 RX ADMIN — INSULIN ASPART 2 UNITS: 100 INJECTION, SOLUTION INTRAVENOUS; SUBCUTANEOUS at 23:58

## 2020-09-26 RX ADMIN — MAGNESIUM OXIDE 400 MG: 400 TABLET ORAL at 08:15

## 2020-09-26 RX ADMIN — CLOTRIMAZOLE 1 LOZENGE: 10 LOZENGE ORAL at 08:15

## 2020-09-26 RX ADMIN — ASPIRIN 81 MG: 81 TABLET, COATED ORAL at 08:15

## 2020-09-26 RX ADMIN — INSULIN ASPART 3 UNITS: 100 INJECTION, SOLUTION INTRAVENOUS; SUBCUTANEOUS at 17:09

## 2020-09-26 RX ADMIN — TAMSULOSIN HYDROCHLORIDE 0.4 MG: 0.4 CAPSULE ORAL at 08:15

## 2020-09-26 RX ADMIN — CLOTRIMAZOLE 1 LOZENGE: 10 LOZENGE ORAL at 20:46

## 2020-09-26 RX ADMIN — HYDRALAZINE HYDROCHLORIDE 75 MG: 25 TABLET ORAL at 20:46

## 2020-09-26 RX ADMIN — PANTOPRAZOLE SODIUM 40 MG: 40 TABLET, DELAYED RELEASE ORAL at 08:15

## 2020-09-26 ASSESSMENT — ACTIVITIES OF DAILY LIVING (ADL)
ADLS_ACUITY_SCORE: 12

## 2020-09-26 NOTE — PLAN OF CARE
D: S/p I&D in OR 9/23 for left former ICD pocket infection and former CT side infection. Hx of end-stage ICM now s/p heart txp 7/20, CAD s/p CABG 2008, DM2, BPH     I/A: A/Ox4. VSS on 2L NC, de-sats to mid-80s on RA. BPs slightly elevated (150s/160s sys). Denies pain. Wound packing changed x1, TID dressing change. L PIV, sl.Tolerating consistent CHO diet with 2L FR, BG check q4hr. Voiding adequately. Loose stool incontinence overnight. Up SBA    P: Transitioning to oral abx today. Continue to monitor and notify CVTS with changes/concerns.

## 2020-09-26 NOTE — PROGRESS NOTES
[facility]    Transplant Infectious Diseases Inpatient Progress Note      Lucian Henderson Sr. MRN# [id number]   YOB: 1955 Age: 64 year old   Date of Admission and time: 9/22/2020 12:31 PM             Recommendations: 1.   Continue ciprofloxacin 500mg PO q12hrs for 2 weeks from day of debridement (9/23/2020), last dose to be given PM of 10/7/2020.  2. No additional ABx needed.         Summary of Presentation:   Transplants:  7/19/2020 (Heart), Postoperative day:  69     This patient is a 64 year old male with ICMP s/p OHT with basiliximab induction and TAC/MMF/prednisone maintenance.   Course complicated by DGF, HIT, RUE DVT.   Presented from cardiology clinic with wound infection.         Active Problems and Infectious Diseases Issues:   1. GPB in one set out of two from blood cx 9/22/2020  GPB growing in one set out of two is a contaminant.   The fact that it grew 4 days after the blood cx were obtained, further corroborate that this represents contamination.   This will not contaminate the graft in the right subclavian area.     2. Surgical wound infection involving the left subclavian wound (prior ICD site) and the abdominal wound (prior drain site) with P aeruginosa.  The Coag Neg Staph is of no clinical value and does not need to be covered.   The S mitis on drainage only, less likely to be significant as tissue cultures with Pseudomonas only.    Underwent surgical debridement 9/23/20. Cultures with Pseudomonas aeruginosa (pansusceptible).      The chimney graft in the right subclavian area does not seem to be involved. But will follow on blood cx for the possibility of secondary seeding.   ID will follow with you.     3. EBV viremia  No signs or symptoms for PTLD.   Monitor EBV by PCR per your protocol.         Old Problems and Infectious Diseases Issues:   1. Donor with S salivarius positive blood cx; the patient received the usual perioperative ABx then  ceftriaxone for total ABx of 7 days.   2. Donor sputum with P fluorescens that failed to grow and Enterobacter spp. The recipient was not treated for these organisms.      Other Infectious Disease issues include:  - QTc 420 as of 9/22/202.   - PCP prophylaxis: Bactrim   - Serostatus: CMV D+/R+, EBV D+/R+, HSV1+/2-, VZV +  On VGCV for universal ppx.   - Immunization status: Too soon to be discussed.   - Gamma globulin status: not recently checked        Attestation:  I interviewed the patient and obtained history from the patient, the wife at the bedside, and by reviewing the patient's chart including outside records, microbiological data, and radiological data. All data are summarized in this note.    Meme Butterfield PA-C  Infectious Disease  Pager # [phone]    09/26/2020           Interim History of Present Illness:   No fever, no new events, no other complaints.         History of Present Illness:   Transplants:  7/19/2020 (Heart), Postoperative day:  69     This patient is a 64 year old male with ICMP s/p OHT with basiliximab induction and TAC/MMF/prednisone maintenance. He had ICD removed from the left subclavian area and right subclavian IABP placed then removed with chimney graft in place.   Course complicated by DGF, HIT, RUE DVT.  The patient presented to the transplant clinic 9/22/20 and was found to have erythema, swelling, tenderness and warmth of the left subclavian and midline abdominal wounds. He was sent to [facility] where blood cx were obtained, wound swab of the left subclavian wound were sent. The patient was then started on zosyn and vancomycin.     ID consulted.     The patient stated that 5 days before admission, he started to develop pain in the involved areas for which he took tylenol. He denied fever or chills. He denied seeing drainage. Denied trauma to the areas.     Exposure History  Was born in [place], stated that he lived for [place] years in the [place], lived in [place] for 50-60 years.   He  lives in [place], [place]. Lives with wife and one dog.   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   He stated he was tested for TB in the remote past and was negative.   He denied exposure to TB.   No institutionalization.   He travels to [place] frequently. His last travel outside of [place] was to [place] 2 years ago.   No significant outdoors activities.                 Review of Systems:      As mentioned in the HPI otherwise negative by reviewing constitutional symptoms, central and peripheral neurological systems, respiratory system, cardiac system, GI system,  system, musculoskeletal, skin, allergy, and lymphatics.                Immunizations:     Immunization History   Administered Date(s) Administered     Influenza (IIV3) PF 10/05/2011, 10/15/2012     Influenza Vaccine IM > 6 months Valent IIV4 10/27/2015, 09/14/2016, 10/05/2017, 10/03/2018     Pneumococcal 23 valent 08/04/2009, 04/03/2015     TDAP Vaccine (Adacel) 08/04/2009            Allergies:     Allergies   Allergen Reactions     Heparin Heparin Induced Thrombocytopenia     HIT screen sent 7/18/2020. CORRINE confirmed positive             Medications:   Medications that Require Transfusion:     - MEDICATION INSTRUCTIONS -       - MEDICATION INSTRUCTIONS -       ACE/ARB/ARNI NOT PRESCRIBED       Scheduled Medications:     acetaminophen  975 mg Oral Q8H     aspirin  81 mg Oral Daily     calcium carbonate 600 mg-vitamin D 400 units  1 tablet Oral BID w/meals     ciprofloxacin  500 mg Oral Q12H MUSA     clotrimazole  1 lozenge Buccal 4x Daily     fondaparinux ANTICOAGULANT  7.5 mg Subcutaneous Q24H     hydrALAZINE  75 mg Oral Q8H     insulin aspart  1-6 Units Subcutaneous Q4H     insulin isophane human  35 Units Subcutaneous QAM     magnesium oxide  400 mg Oral BID     [Held by provider] mycophenolate  750 mg Oral BID     pantoprazole  40 mg Oral QAM AC     predniSONE  10 mg Oral QAM AC     predniSONE  5 mg Oral QPM     rosuvastatin  10  mg Oral Daily     senna-docusate  1 tablet Oral BID    Or     senna-docusate  2 tablet Oral BID     sodium chloride (PF)  3 mL Intracatheter Q8H     sulfamethoxazole-trimethoprim  1 tablet Oral Daily     tacrolimus  3.5 mg Oral QPM     tacrolimus  4 mg Oral QAM     tamsulosin  0.4 mg Oral Daily     valGANciclovir  450 mg Oral Daily               Physical Exam:   Temp: 97.8  F (36.6  C) Temp src: Oral BP: (!) 145/79 Pulse: 82   Resp: 16 SpO2: 99 % O2 Device: Nasal cannula Oxygen Delivery: 2.5 LPM    Wt Readings from Last 4 Encounters:   09/26/20 84.6 kg (186 lb 8 oz)   08/28/20 76.9 kg (169 lb 8.5 oz)   08/17/20 79.8 kg (176 lb)   08/10/20 79.8 kg (176 lb)     Constitutional: awake, alert, cooperative, no apparent distress and appears at stated age, well nourished.   Extremities: +3 pitting edema of bilateral lower extremities, no ulcers, normal ROM of all joints, no swelling or erythema of any of joints and no tenderness to palpation.   Skin: No rashes. The left subclavian wound and the midline abdominal wound are both packed without surrounding erythema, without purulent drainage.            Data:   No results found for: ACD4    Inflammatory Markers    Recent Labs   Lab Test 09/25/20  0549 09/23/20  0710 09/22/20  1256 08/25/20  0445 07/08/19  1021   CRP 16.0* 42.0* 16.0* <2.9 9.9       Immune Globulin Studies     Recent Labs   Lab Test 04/09/15  0721   IGG 1,310   IGM 38*          Metabolic Studies       Recent Labs   Lab Test 09/26/20  0532 09/25/20  0549 09/24/20  0537 09/23/20  1147 09/23/20  0710 09/22/20  1313 09/22/20  1256 09/21/20  1017 09/18/20  0954  08/17/20  0847  07/22/20  1608  07/03/20  1559    139 137  --  136  --  133 133 134   < > 133   < > 140   < > 133   POTASSIUM 5.0 4.9 4.7  --  4.8  --  4.2 4.7 4.5   < > 4.8   < > 4.9   < > 4.4   CHLORIDE 113* 111* 108  --  108  --  104 104 107   < > 102   < > 110*   < > 102   CO2 22 23 21  --  21  --  22 20 17*   < > 26   < > 25   < > 25    ANIONGAP 6 5 8  --  7  --  7 9 10   < > 6   < > 5   < > 6   BUN 41* 36* 37*  --  36*  --  43* 45* 40*   < > 67*   < > 40*   < > 33*   CR 1.84* 1.74* 1.49*  --  1.26*  --  1.50* 1.57* 1.17   < > 1.67*   < > 1.47*   < > 1.65*   GFRESTIMATED 37* 40* 48*  --  59* 47* 48* 45* 64   < > 42*   < > 49*   < > 42*   * 97 141*  --  203*  --  187* 212* 252*   < > 391*   < > 134*   < > 229*   A1C  --   --   --   --   --   --   --   --   --   --   --   --   --   --  8.2*   BRYANNA 8.2* 7.8* 8.1*  --  8.2*  --  8.0* 7.9* 8.0*   < > 8.3*   < > 8.1*   < > 8.7   PHOS  --   --   --   --   --   --   --  1.8* 1.5*   < > 3.5   < > 2.8   < >  --    MAG 2.0 2.1 2.0  --  1.8  --  1.9 1.1* 1.5*   < > 1.9   < > 2.3   < > 2.2   LACT  --   --   --  1.3  --   --   --   --   --   --   --   --  1.0   < >  --    CKT  --   --   --   --   --   --   --   --   --   --  76  --   --   --   --     < > = values in this interval not displayed.       Hepatic Studies    Recent Labs   Lab Test 09/22/20  1256 09/14/20  1153 08/31/20  0856 08/25/20  1705 08/24/20 2026 08/17/20  0847 08/13/20  0713  07/08/19  1021   BILITOTAL 0.3 0.3 0.4  --  0.5 0.5 0.6   < > 0.6   ALKPHOS 93 100 117  --  124 141 91   < > 126   ALBUMIN 2.5* 2.7* 2.7*  --  2.7* 2.7* 2.6*   < > 3.4   AST 17 19 13  --  12 13 16   < > 14   ALT 26 26 31  --  28 29 27   < > 20   LDH  --   --   --  433*  --   --   --   --  174    < > = values in this interval not displayed.       Pancreatitis testing    Recent Labs   Lab Test 08/17/20  0847 07/19/20  1613 07/08/19  1021 08/16/18  0834 07/05/17  0732 07/06/16  0717 06/27/15  0755   AMYLASE  --  36  --   --   --   --   --    TRIG 82  --  181* 297* 226* 225* 99       Hematology Studies      Recent Labs   Lab Test 09/26/20  0532 09/25/20  0549 09/24/20  0537 09/23/20  0710 09/22/20  2242 09/22/20  1256  08/24/20  2026   WBC 1.6* 1.7* 1.8* 2.4* 2.6* 3.0*   < > 4.9   ANEU 1.3* 1.3* 1.5*  --  2.4 2.8  --  4.2   ALYM 0.2* 0.2* 0.2*  --  0.0* 0.0*  --   0.3*   ELAN 0.1 0.1 0.1  --  0.1 0.2  --  0.2   AEOS 0.0 0.0 0.0  --  0.0 0.0  --  0.0   HGB 7.5* 7.4* 7.4* 7.9* 6.8* 7.2*   < > 6.8*   HCT 24.7* 24.8* 24.6* 25.5* 22.2* 23.5*   < > 21.9*    177 166 179 169 187   < > 193    < > = values in this interval not displayed.       Clotting Studies    Recent Labs   Lab Test 09/26/20  0532 09/25/20  0549 09/24/20  0537 09/23/20  0710 09/22/20  1256  08/03/20  0559 08/02/20  0641 08/01/20  0702   INR 1.20* 1.16* 1.18* 1.16* 1.13   < >  --   --   --    PTT  --   --   --   --  31  --  29 29 25    < > = values in this interval not displayed.       Arterial Blood Gas Testing    Recent Labs   Lab Test 07/24/20  0829 07/24/20  0413 07/23/20  2109 07/23/20  0404 07/22/20  2122 07/22/20  1608 07/22/20  1206 07/22/20  0845   PH  --   --   --  7.47* 7.44 7.44 7.43 7.41   PCO2  --   --   --  37 38 39 32* 34*   PO2  --   --   --  104 111* 80 103 123*   HCO3  --   --   --  27 26 27 22 21   O2PER 1.0L 1.5L 1.5L 40  40 40 40  40 40.0 40.0  40.0        Urine Studies     Recent Labs   Lab Test 09/25/20  1241 09/22/20  1448 08/25/20  0731 07/19/20  1930 07/17/20  1402   URINEPH 5.5 5.0 5.0 6.0 5.0   NITRITE Negative Negative Negative Negative Negative   LEUKEST Negative Negative Negative Negative Large*   WBCU 0 1 0 <1 13*       Tacrolimus levels    Invalid input(s): TACROLIMUS, TAC, TACR  Transplant Immunosuppression Labs Latest Ref Rng & Units 9/26/2020 9/25/2020 9/24/2020 9/23/2020 9/22/2020   Tacro Level 5.0 - 15.0 ug/L 13.0 - 8.4 - -   Tacro Level - Not Provided - Not Provided - -   Creat 0.66 - 1.25 mg/dL 1.84(H) 1.74(H) 1.49(H) 1.26(H) -   BUN 7 - 30 mg/dL 41(H) 36(H) 37(H) 36(H) -   WBC 4.0 - 11.0 10e9/L 1.6(L) 1.7(L) 1.8(L) 2.4(L) 2.6(L)   Neutrophil % 82.2 78.1 83.4 - 94.0   ANEU 1.6 - 8.3 10e9/L 1.3(L) 1.3(L) 1.5(L) - 2.4       Microbiology:  Blood cx 1/2 with GPR   Wound cx swab with P aeruginosa and S mitis from the abdominal wound and P aeruginosa and Coag Neg Staph  from the subclavian wound.   Surgical wound cx only with P aeruginosa.   Last check of C difficile  No results found for: CDBPCT    Virology:  CMV viral loads  No results found for: 08368, 74163, 88738, 26500, CMVQAL  CMV viral loads    Recent Labs   Lab Test 08/17/20  0847   CSPEC Plasma, EDTA anticoagulant   CMVLOG Not Calculated       CMV viral loads    Log IU/mL of CMVQNT   Date Value Ref Range Status   08/17/2020 Not Calculated <2.1 [Log_IU]/mL Final       CMV resistance testing  No lab results found.  No results found for: CMVCID, CMVFOS, CMVGAN     No results found for: H6RES    EBV DNA Copies/mL   Date Value Ref Range Status   08/17/2020 766 (A) EBVNEG^EBV DNA Not Detected [Copies]/mL Final       CMV Antibody IgG   Date Value Ref Range Status   07/19/2020 >8.0 (H) 0.0 - 0.8 AI Final     Comment:     Positive  Antibody index (AI) values reflect qualitative changes in antibody   concentration that cannot be directly associated with clinical condition or   disease state.     07/08/2019 >8.0 (H) 0.0 - 0.8 AI Final     Comment:     Positive  Antibody index (AI) values reflect qualitative changes in antibody   concentration that cannot be directly associated with clinical condition or   disease state.         Toxoplasma Antibody IgG   Date Value Ref Range Status   07/08/2019 <3.0 0.0 - 7.1 IU/mL Final     Comment:     Negative- Absence of detectable Toxoplasma gondii IgG antibodies. A negative   result does not rule out acute infection.  The magnitude of the measured result is not indicative of the amount of   antibody present. The concentrations of anti-Toxoplasma gondii IgG in a given   specimen determined with assays from different manufacturers can vary due to   differences in assay methods and reagent specificity.           Imaging:  CT c/a/p 9/22/2020   IMPRESSION:   In this patient who is approximately 2 months postop orthotopic heart  transplant:   1. Left supraclavicular subcutaneous soft tissue  thickening and fat  stranding is likely an infectious/inflammatory process originating  from the patient's prior left implantable cardiac defibrillator chest  wall pocket. No appreciable associated drainable fluid collection,  though this process is incompletely visualized, extending above the  field-of-view.  2. Simple appearing anterior mediastinal/pericardial fluid collection  with thin rim enhancement but no significant associated inflammatory  fat stranding, likely a postoperative serous collection. There is no  evidence of sternal wound involvement or communication with the  presumed left supraclavicular fossa cellulitis.   3. Small region of isolated skin/subcutaneous soft tissue thickening  in the epigastric region, likely corresponding to the site of a prior  pericardial drain and suggestive of cellulitis. No drainable fluid  collection at this location.   4. Retained vascular access cannula in the right supraclavicular fossa  with an adjacent elongated subclavian artery pseudoaneurysm, contained  by surrounded by soft tissue thickening.  4. Small left and trace right pleural effusions.  5. Cholelithiasis without cholecystitis.      Jillian Hurtado MD   Pager: [phone]  09/26/2020

## 2020-09-26 NOTE — PROGRESS NOTES
Cardiology Progress Note  Lucian Henderson Sr. MRN: [id number]  Age: 64 year old, : 1955  09/26/20    Assessment & Plan   Lucian Henderson Sr. is a 64 year old male admitted on 2020 for left subclavian and abdominal surgical wound infections growing P. Aeruginosa s/p I&D.     Changes Today:  - continue ciprofloxacin 500 mg BID, EOT 10/7.  - Wound nurse consulted.  - To have wound vac placement.  - Decrease Tacrolimus to 3 mg am, 3.5 mg pm.  - Hold fondaparinux on 2020     left subclavian surgical wound infection  Abdominal wound surgical wound infection  - Blood culture (2020): Gram positive rods on 4th day of incubation; per ID, likely a contaminant. Noted to be bleeding from left subclavian wound this evening, hemostasis achieved with pressure and silver nitrate. V/S at bedside stable. AxOx3. Wound dressing clean and dry.   - repeat CBC.  - wound culture:    - Abdominal wound culture  growing strep mitis and pseudomonas.    - Left chest former ICD pocket culture  growing pseudomonas.  - CRP trend: 16.0 ()->42.0 ()->16.0 ().  - continue ciprofloxacin 500 mg BID, EOT 10/7.  - CV Surgery consulted              > right subclavian site appears healed, no planned intervention   > Patient may benefit from wound nurse consult and wound vac placement.     ICM s/p OHT  Leukopenia, moderate neutropenia  Most recent biopsy (2020) was negative.  -Immunsuppression:              - continue Prednisone 10 mg am, 5 mg pm              - Tacrolimus level (2020, 13.0 micrograms/dl)    > trend tacro, goal 8-10 in setting of infection.     > Per pharmacy, decrease Tacrolimus to 3 mg am, 3.5 mg pm; next level .              - Hold Mycophenolate 750 mg BID in setting of infection and leukopenia/neutropenia  - Prophylaxis             - Valgancyclovir 450 mg every 48 hours             - Bactrim (400-80 mg) 1 daily               - Clotrimazole 10 mg lozenge  -Hydralazine 75mg Q8H    DAYA on CKD, non-oliguric  Cr today 1.84, baseline unclear. Is making urine. UA unremarkable. Low suspicion for obstruction. I/O suggestive of pre-renal dehydration.  - NS bolus 500 ml.      Hypomagnesemia, resolved              - Keep Mg > 2.0                          >Replacement protocol              - continue magnesium oxide 400 mg bid     Chronic normocytic anemia  possibly anemia of chronic disease. Iron studies: elevated ferritin (447 ng/ml), iron and iron binding cap are wnl. Hold-off on transfusions for now given risk of rejection.               - continue to monitor.    - Peripheral smear.     T2DM  - continue pta Humulin 35 units daily  - MDSSI     Constipation history  - Miralax 17 g daily  - Senna-docusate 8.6-50 mg bid     BPH  - Continue PTA Tamsulosin 0.4 mg daily     h/o DVT  h/o HIT  - continue PTA Fondaparinux 7.5 mg daily. Hold on 09/27/2020.     Diet: Carb-restricted diet.  DVT Prophylaxis: Fondaparinux 7.5 mg daily.  GI prophylaxis: Pantoprazole 40 mg daily  Osteoporosis: Calcium carbonate -Vitamin D 600-400mg daily  CAV: Rosuvastatin 10 mg daily  Calderon Catheter: not present  Code Status: Full code  Fluids: None  Lines: PIV    The patient's care was discussed with the Attending Physician, Dr. Holder.    Erickson Kelly MD  Internal Medicine, PGY-1  [facility], [place]  Pager: [phone]    Interval History   NAEO.   No complaints this am.    Physical Exam   Temp: 97.8  F (36.6  C) Temp src: Oral BP: (!) 145/79 Pulse: 82   Resp: 16 SpO2: 99 % O2 Device: Nasal cannula Oxygen Delivery: 2.5 LPM  Vitals:    09/24/20 0508 09/25/20 0613 09/26/20 0355   Weight: 82.2 kg (181 lb 3.2 oz) 83.1 kg (183 lb 4.8 oz) 84.6 kg (186 lb 8 oz)     Vital Signs with Ranges  Temp:  [97.8  F (36.6  C)-98.8  F (37.1  C)] 97.8  F (36.6  C)  Pulse:  [79-89] 82  Resp:  [16-18] 16  BP: (140-169)/(69-88) 145/79  FiO2 (%):  [2 %] 2  %  SpO2:  [98 %-100 %] 99 %  I/O last 3 completed shifts:  In: 350 [P.O.:240; I.V.:110]  Out: 1325 [Urine:1325]  Constitutional: awake, alert, cooperative, no apparent distress  HENT: PERRL, EOM grossly intact, sclera anicteric, oral pharynx with moist mucous membranes  Respiratory: non-labored respirations on room-air, CTAB, no crackles or wheezing, no cough  Cardiovascular: RRR, no murmurs, no JVD  GI: soft, non-distended, non-tender.  Ext: bilateral pitting LE edema up to the knees.  Skin: left clavicular wound c/d/i with packing, open abdominal wound c/d/i with packing, right subclavian incision healed.  Neurologic: Cranial nerves II-XII are grossly intact, moving all extremities equally and independently      Medications     - MEDICATION INSTRUCTIONS -       - MEDICATION INSTRUCTIONS -       ACE/ARB/ARNI NOT PRESCRIBED         acetaminophen  975 mg Oral Q8H     aspirin  81 mg Oral Daily     calcium carbonate 600 mg-vitamin D 400 units  1 tablet Oral BID w/meals     ciprofloxacin  500 mg Oral Q12H MUSA     clotrimazole  1 lozenge Buccal 4x Daily     fondaparinux ANTICOAGULANT  7.5 mg Subcutaneous Q24H     hydrALAZINE  75 mg Oral Q8H     insulin aspart  1-6 Units Subcutaneous Q4H     insulin isophane human  35 Units Subcutaneous QAM     magnesium oxide  400 mg Oral BID     [Held by provider] mycophenolate  750 mg Oral BID     pantoprazole  40 mg Oral QAM AC     predniSONE  10 mg Oral QAM AC     predniSONE  5 mg Oral QPM     rosuvastatin  10 mg Oral Daily     senna-docusate  1 tablet Oral BID    Or     senna-docusate  2 tablet Oral BID     sodium chloride (PF)  3 mL Intracatheter Q8H     sulfamethoxazole-trimethoprim  1 tablet Oral Daily     tacrolimus  3.5 mg Oral QPM     tacrolimus  4 mg Oral QAM     tamsulosin  0.4 mg Oral Daily     valGANciclovir  450 mg Oral Daily       Data   Recent Labs   Lab 09/26/20  0532 09/25/20  0549 09/24/20  0537  09/22/20  1256   WBC 1.6* 1.7* 1.8*   < > 3.0*   HGB 7.5* 7.4* 7.4*   <  > 7.2*    98 100   < > 100    177 166   < > 187   INR 1.20* 1.16* 1.18*   < > 1.13    139 137   < > 133   POTASSIUM 5.0 4.9 4.7   < > 4.2   CHLORIDE 113* 111* 108   < > 104   CO2 22 23 21   < > 22   BUN 41* 36* 37*   < > 43*   CR 1.84* 1.74* 1.49*   < > 1.50*   ANIONGAP 6 5 8   < > 7   BRYANNA 8.2* 7.8* 8.1*   < > 8.0*   * 97 141*   < > 187*   ALBUMIN  --   --   --   --  2.5*   PROTTOTAL  --   --   --   --  5.5*   BILITOTAL  --   --   --   --  0.3   ALKPHOS  --   --   --   --  93   ALT  --   --   --   --  26   AST  --   --   --   --  17    < > = values in this interval not displayed.       No results found for this or any previous visit (from the past 24 hour(s)).

## 2020-09-26 NOTE — PLAN OF CARE
"6C OT: Cancel     Pt declining therapy upon AM attempt despite encouragement. Educated pt on active participation for greater outcomes, pt verbalized understanding but stated, \"I just can't do it.\" Will reschedule per POC.   "

## 2020-09-27 LAB
ANION GAP SERPL CALCULATED.3IONS-SCNC: 7 MMOL/L (ref 3–14)
ANISOCYTOSIS BLD QL SMEAR: SLIGHT
BASOPHILS # BLD AUTO: 0 10E9/L (ref 0–0.2)
BASOPHILS NFR BLD AUTO: 0 %
BUN SERPL-MCNC: 36 MG/DL (ref 7–30)
CALCIUM SERPL-MCNC: 8.5 MG/DL (ref 8.5–10.1)
CHLORIDE SERPL-SCNC: 110 MMOL/L (ref 94–109)
CO2 SERPL-SCNC: 22 MMOL/L (ref 20–32)
CREAT SERPL-MCNC: 1.38 MG/DL (ref 0.66–1.25)
DIFFERENTIAL METHOD BLD: ABNORMAL
EOSINOPHIL # BLD AUTO: 0 10E9/L (ref 0–0.7)
EOSINOPHIL NFR BLD AUTO: 0.9 %
ERYTHROCYTE [DISTWIDTH] IN BLOOD BY AUTOMATED COUNT: 19.8 % (ref 10–15)
ERYTHROCYTE [DISTWIDTH] IN BLOOD BY AUTOMATED COUNT: 19.9 % (ref 10–15)
GFR SERPL CREATININE-BSD FRML MDRD: 53 ML/MIN/{1.73_M2}
GLUCOSE BLDC GLUCOMTR-MCNC: 129 MG/DL (ref 70–99)
GLUCOSE BLDC GLUCOMTR-MCNC: 166 MG/DL (ref 70–99)
GLUCOSE BLDC GLUCOMTR-MCNC: 172 MG/DL (ref 70–99)
GLUCOSE BLDC GLUCOMTR-MCNC: 206 MG/DL (ref 70–99)
GLUCOSE BLDC GLUCOMTR-MCNC: 269 MG/DL (ref 70–99)
GLUCOSE BLDC GLUCOMTR-MCNC: 278 MG/DL (ref 70–99)
GLUCOSE BLDC GLUCOMTR-MCNC: 291 MG/DL (ref 70–99)
GLUCOSE SERPL-MCNC: 168 MG/DL (ref 70–99)
HCT VFR BLD AUTO: 23.6 % (ref 40–53)
HCT VFR BLD AUTO: 25 % (ref 40–53)
HGB BLD-MCNC: 7.3 G/DL (ref 13.3–17.7)
HGB BLD-MCNC: 7.7 G/DL (ref 13.3–17.7)
INR PPP: 1.15 (ref 0.86–1.14)
LYMPHOCYTES # BLD AUTO: 0.4 10E9/L (ref 0.8–5.3)
LYMPHOCYTES NFR BLD AUTO: 22.1 %
MACROCYTES BLD QL SMEAR: PRESENT
MAGNESIUM SERPL-MCNC: 2 MG/DL (ref 1.6–2.3)
MCH RBC QN AUTO: 30.4 PG (ref 26.5–33)
MCH RBC QN AUTO: 30.8 PG (ref 26.5–33)
MCHC RBC AUTO-ENTMCNC: 30.8 G/DL (ref 31.5–36.5)
MCHC RBC AUTO-ENTMCNC: 30.9 G/DL (ref 31.5–36.5)
MCV RBC AUTO: 100 FL (ref 78–100)
MCV RBC AUTO: 99 FL (ref 78–100)
MONOCYTES # BLD AUTO: 0.1 10E9/L (ref 0–1.3)
MONOCYTES NFR BLD AUTO: 8.8 %
MYELOCYTES # BLD: 0 10E9/L
MYELOCYTES NFR BLD MANUAL: 0.9 %
NEUTROPHILS # BLD AUTO: 1.1 10E9/L (ref 1.6–8.3)
NEUTROPHILS NFR BLD AUTO: 67.3 %
PLATELET # BLD AUTO: 173 10E9/L (ref 150–450)
PLATELET # BLD AUTO: 194 10E9/L (ref 150–450)
POTASSIUM SERPL-SCNC: 4.8 MMOL/L (ref 3.4–5.3)
RBC # BLD AUTO: 2.37 10E12/L (ref 4.4–5.9)
RBC # BLD AUTO: 2.53 10E12/L (ref 4.4–5.9)
SODIUM SERPL-SCNC: 139 MMOL/L (ref 133–144)
WBC # BLD AUTO: 1.7 10E9/L (ref 4–11)
WBC # BLD AUTO: 1.9 10E9/L (ref 4–11)

## 2020-09-27 PROCEDURE — 83735 ASSAY OF MAGNESIUM: CPT | Performed by: STUDENT IN AN ORGANIZED HEALTH CARE EDUCATION/TRAINING PROGRAM

## 2020-09-27 PROCEDURE — 85027 COMPLETE CBC AUTOMATED: CPT | Performed by: STUDENT IN AN ORGANIZED HEALTH CARE EDUCATION/TRAINING PROGRAM

## 2020-09-27 PROCEDURE — 85610 PROTHROMBIN TIME: CPT | Performed by: STUDENT IN AN ORGANIZED HEALTH CARE EDUCATION/TRAINING PROGRAM

## 2020-09-27 PROCEDURE — 25000131 ZZH RX MED GY IP 250 OP 636 PS 637: Performed by: STUDENT IN AN ORGANIZED HEALTH CARE EDUCATION/TRAINING PROGRAM

## 2020-09-27 PROCEDURE — 85025 COMPLETE CBC W/AUTO DIFF WBC: CPT | Performed by: STUDENT IN AN ORGANIZED HEALTH CARE EDUCATION/TRAINING PROGRAM

## 2020-09-27 PROCEDURE — 25000132 ZZH RX MED GY IP 250 OP 250 PS 637: Performed by: INTERNAL MEDICINE

## 2020-09-27 PROCEDURE — 36415 COLL VENOUS BLD VENIPUNCTURE: CPT | Performed by: STUDENT IN AN ORGANIZED HEALTH CARE EDUCATION/TRAINING PROGRAM

## 2020-09-27 PROCEDURE — 99232 SBSQ HOSP IP/OBS MODERATE 35: CPT | Mod: GC | Performed by: INTERNAL MEDICINE

## 2020-09-27 PROCEDURE — 21400000 ZZH R&B CCU UMMC

## 2020-09-27 PROCEDURE — 25000132 ZZH RX MED GY IP 250 OP 250 PS 637: Performed by: STUDENT IN AN ORGANIZED HEALTH CARE EDUCATION/TRAINING PROGRAM

## 2020-09-27 PROCEDURE — 0HD5XZZ EXTRACTION OF CHEST SKIN, EXTERNAL APPROACH: ICD-10-PCS | Performed by: SURGERY

## 2020-09-27 PROCEDURE — 00000146 ZZHCL STATISTIC GLUCOSE BY METER IP

## 2020-09-27 PROCEDURE — 25000131 ZZH RX MED GY IP 250 OP 636 PS 637: Performed by: INTERNAL MEDICINE

## 2020-09-27 PROCEDURE — 80048 BASIC METABOLIC PNL TOTAL CA: CPT | Performed by: STUDENT IN AN ORGANIZED HEALTH CARE EDUCATION/TRAINING PROGRAM

## 2020-09-27 PROCEDURE — 0JC60ZZ EXTIRPATION OF MATTER FROM CHEST SUBCUTANEOUS TISSUE AND FASCIA, OPEN APPROACH: ICD-10-PCS | Performed by: SURGERY

## 2020-09-27 RX ORDER — LIDOCAINE 40 MG/G
CREAM TOPICAL
Status: CANCELLED | OUTPATIENT
Start: 2020-09-27

## 2020-09-27 RX ADMIN — SULFAMETHOXAZOLE AND TRIMETHOPRIM 1 TABLET: 400; 80 TABLET ORAL at 07:48

## 2020-09-27 RX ADMIN — VALGANCICLOVIR 450 MG: 450 TABLET, FILM COATED ORAL at 07:49

## 2020-09-27 RX ADMIN — TACROLIMUS 3.5 MG: 1 CAPSULE ORAL at 17:40

## 2020-09-27 RX ADMIN — CLOTRIMAZOLE 1 LOZENGE: 10 LOZENGE ORAL at 17:40

## 2020-09-27 RX ADMIN — CLOTRIMAZOLE 1 LOZENGE: 10 LOZENGE ORAL at 19:29

## 2020-09-27 RX ADMIN — MAGNESIUM OXIDE 400 MG: 400 TABLET ORAL at 19:29

## 2020-09-27 RX ADMIN — PREDNISONE 10 MG: 10 TABLET ORAL at 07:49

## 2020-09-27 RX ADMIN — PREDNISONE 5 MG: 5 TABLET ORAL at 19:30

## 2020-09-27 RX ADMIN — INSULIN HUMAN 35 UNITS: 100 INJECTION, SUSPENSION SUBCUTANEOUS at 07:50

## 2020-09-27 RX ADMIN — CLOTRIMAZOLE 1 LOZENGE: 10 LOZENGE ORAL at 11:50

## 2020-09-27 RX ADMIN — TACROLIMUS 3 MG: 1 CAPSULE ORAL at 07:49

## 2020-09-27 RX ADMIN — OXYCODONE HYDROCHLORIDE 5 MG: 5 TABLET ORAL at 10:38

## 2020-09-27 RX ADMIN — INSULIN ASPART 1 UNITS: 100 INJECTION, SOLUTION INTRAVENOUS; SUBCUTANEOUS at 11:50

## 2020-09-27 RX ADMIN — CIPROFLOXACIN HYDROCHLORIDE 500 MG: 500 TABLET, FILM COATED ORAL at 07:48

## 2020-09-27 RX ADMIN — CLOTRIMAZOLE 1 LOZENGE: 10 LOZENGE ORAL at 07:48

## 2020-09-27 RX ADMIN — INSULIN ASPART 3 UNITS: 100 INJECTION, SOLUTION INTRAVENOUS; SUBCUTANEOUS at 17:47

## 2020-09-27 RX ADMIN — CIPROFLOXACIN HYDROCHLORIDE 500 MG: 500 TABLET, FILM COATED ORAL at 19:30

## 2020-09-27 RX ADMIN — Medication 1 TABLET: at 07:48

## 2020-09-27 RX ADMIN — PANTOPRAZOLE SODIUM 40 MG: 40 TABLET, DELAYED RELEASE ORAL at 07:48

## 2020-09-27 RX ADMIN — TAMSULOSIN HYDROCHLORIDE 0.4 MG: 0.4 CAPSULE ORAL at 07:49

## 2020-09-27 RX ADMIN — MAGNESIUM OXIDE 400 MG: 400 TABLET ORAL at 07:48

## 2020-09-27 RX ADMIN — ROSUVASTATIN CALCIUM 10 MG: 10 TABLET, FILM COATED ORAL at 07:48

## 2020-09-27 RX ADMIN — HYDRALAZINE HYDROCHLORIDE 75 MG: 25 TABLET ORAL at 19:29

## 2020-09-27 RX ADMIN — INSULIN ASPART 4 UNITS: 100 INJECTION, SOLUTION INTRAVENOUS; SUBCUTANEOUS at 20:31

## 2020-09-27 RX ADMIN — Medication 1 TABLET: at 17:40

## 2020-09-27 RX ADMIN — INSULIN ASPART 1 UNITS: 100 INJECTION, SOLUTION INTRAVENOUS; SUBCUTANEOUS at 03:52

## 2020-09-27 RX ADMIN — HYDRALAZINE HYDROCHLORIDE 75 MG: 25 TABLET ORAL at 11:50

## 2020-09-27 RX ADMIN — HYDRALAZINE HYDROCHLORIDE 75 MG: 25 TABLET ORAL at 03:55

## 2020-09-27 ASSESSMENT — ACTIVITIES OF DAILY LIVING (ADL)
ADLS_ACUITY_SCORE: 14
ADLS_ACUITY_SCORE: 12

## 2020-09-27 NOTE — PLAN OF CARE
AOx4, VSS RA-2L NC, Tele: SR, up SBA - At start of shift, dressing had just been changed on L Subclavian site, Around 9 am wound started bleeding through bandages, pt was diaphoretic and pale ,upon checking site - large clot was noted - CVTS was paged and provider came to bedside and debrided wound.  I&D scheduled for tomorrow along w/ R Heart Cath and Biopsy. Holding fondaparinux, NPO @ midnight.  Hold NPH Insulin in am - continue to monitor closely and notify Cards 2 and CVTS w/any updates or concerns

## 2020-09-27 NOTE — PLAN OF CARE
6C OT: Cancel     Per chart review and RN report, pt cont bleeding on subclavian wound, anticipate debridement. Will cancel and reschedule as appropriate.

## 2020-09-27 NOTE — PROGRESS NOTES
Cardiovascular Surgery Progress Note  09/27/2020  Surgeon: Dr. Huertas           Assessment and Plan:     Lucian Henderson Sr. is a 64 year old male with a PMH of end-stage ischemic cardiomyopathy, CAD s/p CABG 2008, DMT2, who was admitted to [facility] 07/03 for heart failure exacerbation with cardiogenic shock, underwent right subclavian IABP insertion 07/16 with Dr. Sparrow, and then heart transplant 07/20 with Dr. Griselli. 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 Now on Fondaparinux. Readmitted to [facility] 09/22 with hyperglycemia, CV surgery consulted for left former ICD pocket infection and former chest tube site infection. Underwent I&D in OR 09/23 with Dr. Huertas.       Former ICD pocket infection (left chest)  Former chest tube site infection (upper abdomen)  - S/p I&D 09/23 in OR with Dr. Huertas  - Continue TID packing change (RN to do)  - Transplant ID following. Abdominal wound culture 09/23 growing strep mitis and pseudomonas. Left chest former ICD pocket culture 09/23 growing pseudomonas. Zosyn transitioned to cipro  - 09/26-9/27 overnight- ICD pocket wound with bleeding. Large hematoma noted, debrided at bedside. Discussed with Dr. Huertas, plan repeat I&D in OR 09/28.  - NPO at midnight (FYI page sent to endocrine)  - Please continue to hold fondaparinux if possible  - Of note, on 09/23 vascular surgery reviewed the CT imaging of right subclavian IABP graft site given question of right subclavian pseudoaneurysm vs localized fluid collection. Wound currently appears well healing, without active infection, and vascular surgery felt it did not warrant wound exploration at this time.        Staff surgeons have been informed of changes through both verbal and written communication.        Trixie Quintanilla PA-C  Cardiothoracic Surgery  Pager [phone]              Interval History:   Denies complaint. Had pain with dressing change, but otherwise pain controlled         Physical  Exam:   Blood pressure 128/71, pulse 83, temperature 98.6  F (37  C), temperature source Oral, resp. rate 18, weight 84.2 kg (185 lb 11.2 oz), SpO2 98 %.  Vitals:    09/25/20 0613 09/26/20 0355 09/27/20 0330   Weight: 83.1 kg (183 lb 4.8 oz) 84.6 kg (186 lb 8 oz) 84.2 kg (185 lb 11.2 oz)        Incisions:  Sternal incision: well healed.    Left subclavian former ICD pocket:  packing removed, 4x4 cm hematoma protruding from wound, this was debrided with forceps and scalpel at bedside. No active bleeding. Fair amount of necrotic tissue/slough noted. No purulence. Tissue around wound indurated, no erythema, no bleeding.   Upper abdomen former chest tube site: packing removed, moderate cloudy drainage, gently debrided at bedside             Data:        Labs:  BMP  Recent Labs   Lab 09/27/20 0428 09/26/20  0532 09/25/20  0549 09/24/20  0537    142 139 137   POTASSIUM 4.8 5.0 4.9 4.7   CHLORIDE 110* 113* 111* 108   BRYANNA 8.5 8.2* 7.8* 8.1*   CO2 22 22 23 21   BUN 36* 41* 36* 37*   CR 1.38* 1.84* 1.74* 1.49*   * 170* 97 141*     CBC  Recent Labs   Lab 09/27/20  1540 09/27/20 0428 09/26/20  1837 09/26/20  0532   WBC 1.9* 1.7* 1.7* 1.6*   RBC 2.53* 2.37* 2.46* 2.46*   HGB 7.7* 7.3* 7.7* 7.5*   HCT 25.0* 23.6* 24.7* 24.7*   MCV 99 100 100 100   MCH 30.4 30.8 31.3 30.5   MCHC 30.8* 30.9* 31.2* 30.4*   RDW 19.9* 19.8* 19.9* 19.9*    173 195 192     INR  Recent Labs   Lab 09/27/20  0428 09/26/20  0532 09/25/20  0549 09/24/20  0537   INR 1.15* 1.20* 1.16* 1.18*      Liver Function Studies -   Recent Labs   Lab Test 09/22/20  1256   PROTTOTAL 5.5*   ALBUMIN 2.5*   BILITOTAL 0.3   ALKPHOS 93   AST 17   ALT 26     GLUCOSE:   Recent Labs   Lab 09/27/20  1140 09/27/20  0752 09/27/20  0428 09/27/20  0351 09/26/20  2358 09/26/20  2050 09/26/20  1708  09/26/20  0532  09/25/20  0549  09/24/20  0537  09/23/20  0710  09/22/20  1256   GLC  --   --  168*  --   --   --   --   --  170*  --  97  --  141*  --  203*  --  187*    * 129*  --  172* 206* 221* 261*   < >  --    < >  --    < >  --    < >  --    < >  --     < > = values in this interval not displayed.

## 2020-09-27 NOTE — PROGRESS NOTES
Cardiology Progress Note  Lucian Henderson Sr. MRN: [id number]  Age: 64 year old, : 1955  2020      Assessment & Plan   Lucian Henderson Sr. is a 64 year old male admitted on 2020 for left subclavian and abdominal surgical wound infections growing P. Aeruginosa s/p I&D.     Changes Today:  - for repeat I&D tomorrow.  - NPO at midnight  - Hold NPH insulin in the am  - Tacrolimus level tomorrow,  - RHC  and biopsy tomorrow  - Hold fondaparinux.     left subclavian surgical wound infection  Abdominal wound surgical wound infection  - Blood culture (2020): Gram positive rods on 4th day of incubation; per ID, likely a contaminant. Noted to be bleeding from left subclavian wound. Per CVTS, large hematoma noted, debrided by bedside.  - wound culture:    - Abdominal wound culture  growing strep mitis and pseudomonas.    - Left chest former ICD pocket culture  growing pseudomonas.  - CRP trend: 16.0 ()->42.0 ()->16.0 ().  - continue ciprofloxacin 500 mg BID, EOT 10/7.  - CV Surgery consulted              > right subclavian site appears healed, no planned intervention   > For repeat I&D of left chest wound in OR 2020     ICM s/p OHT  Leukopenia, moderate neutropenia  Most recent biopsy (2020) was negative.  -Immunsuppression:              - continue Prednisone 10 mg am, 5 mg pm              - Tacrolimus level (2020, 13.0 micrograms/dl)    > trend tacro, goal 8-10 in setting of infection.     > Per pharmacy, decrease Tacrolimus to 3 mg am, 3.5 mg pm; next level .              - Hold Mycophenolate 750 mg BID in setting of infection and leukopenia/neutropenia  - Prophylaxis             - Valgancyclovir 450 mg every 48 hours             - Bactrim (400-80 mg) 1 daily              - Clotrimazole 10 mg lozenge  -Hydralazine 75mg Q8H    DAYA on CKD, non-oliguric  Cr today 1.38, baseline unclear. Is making urine. UA  unremarkable. Low suspicion for obstruction. I/O suggestive of pre-renal dehydration.  - encourage oral intake.      Hypomagnesemia, resolved              - Keep Mg > 2.0                          >Replacement protocol              - continue magnesium oxide 400 mg bid     Chronic normocytic anemia  possibly anemia of chronic disease. Iron studies: elevated ferritin (447 ng/ml), iron and iron binding cap are wnl. Hold-off on transfusions for now given risk of rejection.               - continue to monitor.    - Peripheral smear, results pending.     T2DM  NPO at midnight.   - Hold pta Humulin 35 units daily.  - MDSSI.     Constipation history  - Miralax 17 g daily  - Senna-docusate 8.6-50 mg bid     BPH  - Continue PTA Tamsulosin 0.4 mg daily     h/o DVT  h/o HIT  - continue PTA Fondaparinux 7.5 mg daily. Held on 09/27/2020.     Diet: Carb-restricted diet. NPO at midnight today.  DVT Prophylaxis: holding Fondaparinux 7.5 mg daily.  GI prophylaxis: Pantoprazole 40 mg daily  Osteoporosis: Calcium carbonate -Vitamin D 600-400mg daily  CAV: Rosuvastatin 10 mg daily  Calderon Catheter: not present  Code Status: Full code  Fluids: None  Lines: PIV    The patient's care was discussed with the Attending Physician, Dr. Glory Kelly MD  Internal Medicine, PGY-1  [facility], [place]  Pager: [phone]    Interval History   Had repeated episodes of bleeding from left chest wound.   No complaints this am.    Physical Exam   Temp: 98.8  F (37.1  C) Temp src: Oral BP: (!) 160/86(kayce hydralazine given) Pulse: 81   Resp: 16 SpO2: 99 % O2 Device: Nasal cannula Oxygen Delivery: 2 LPM  Vitals:    09/25/20 0613 09/26/20 0355 09/27/20 0330   Weight: 83.1 kg (183 lb 4.8 oz) 84.6 kg (186 lb 8 oz) 84.2 kg (185 lb 11.2 oz)     Vital Signs with Ranges  Temp:  [97.8  F (36.6  C)-98.8  F (37.1  C)] 98.8  F (37.1  C)  Pulse:  [77-82] 81  Resp:  [16] 16  BP: (142-160)/(70-87) 160/86  FiO2 (%):  [2 %] 2 %  SpO2:   [97 %-99 %] 99 %  I/O last 3 completed shifts:  In: 350 [P.O.:240; I.V.:110]  Out: 1275 [Urine:1275]  Constitutional: awake, alert, cooperative, no apparent distress.  HENT: PERRL, EOM grossly intact, pale conjunctivae, sclera anicteric, oral pharynx with moist mucous membranes.  Respiratory: non-labored respirations on room-air, CTAB, no crackles or wheezing, no cough.  Cardiovascular: RRR, no murmurs, no JVD.  GI: soft, non-distended, non-tender.  Ext: bilateral pitting LE edema up to the knees.  Skin: left clavicular wound c/d/i with packing, open abdominal wound c/d/i with packing, right subclavian incision healed.  Neurologic: Cranial nerves II-XII are grossly intact, moving all extremities equally and independently.      Medications     - MEDICATION INSTRUCTIONS -       - MEDICATION INSTRUCTIONS -       ACE/ARB/ARNI NOT PRESCRIBED         aspirin  81 mg Oral Daily     calcium carbonate 600 mg-vitamin D 400 units  1 tablet Oral BID w/meals     ciprofloxacin  500 mg Oral Q12H MUSA     clotrimazole  1 lozenge Buccal 4x Daily     [Held by provider] fondaparinux ANTICOAGULANT  7.5 mg Subcutaneous Q24H     hydrALAZINE  75 mg Oral Q8H     insulin aspart  1-6 Units Subcutaneous Q4H     insulin isophane human  35 Units Subcutaneous QAM     magnesium oxide  400 mg Oral BID     [Held by provider] mycophenolate  750 mg Oral BID     pantoprazole  40 mg Oral QAM AC     predniSONE  10 mg Oral QAM AC     predniSONE  5 mg Oral QPM     rosuvastatin  10 mg Oral Daily     senna-docusate  1 tablet Oral BID    Or     senna-docusate  2 tablet Oral BID     sodium chloride (PF)  3 mL Intracatheter Q8H     sulfamethoxazole-trimethoprim  1 tablet Oral Daily     tacrolimus  3 mg Oral QAM     tacrolimus  3.5 mg Oral QPM     tamsulosin  0.4 mg Oral Daily     valGANciclovir  450 mg Oral Daily       Data   Recent Labs   Lab 09/27/20  0428 09/26/20  1837 09/26/20  0532 09/25/20  0549  09/22/20  1256   WBC 1.7* 1.7* 1.6* 1.7*   < > 3.0*   HGB  7.3* 7.7* 7.5* 7.4*   < > 7.2*    100 100 98   < > 100    195 192 177   < > 187   INR 1.15*  --  1.20* 1.16*   < > 1.13     --  142 139   < > 133   POTASSIUM 4.8  --  5.0 4.9   < > 4.2   CHLORIDE 110*  --  113* 111*   < > 104   CO2 22  --  22 23   < > 22   BUN 36*  --  41* 36*   < > 43*   CR 1.38*  --  1.84* 1.74*   < > 1.50*   ANIONGAP 7  --  6 5   < > 7   BRYANNA 8.5  --  8.2* 7.8*   < > 8.0*   *  --  170* 97   < > 187*   ALBUMIN  --   --   --   --   --  2.5*   PROTTOTAL  --   --   --   --   --  5.5*   BILITOTAL  --   --   --   --   --  0.3   ALKPHOS  --   --   --   --   --  93   ALT  --   --   --   --   --  26   AST  --   --   --   --   --  17    < > = values in this interval not displayed.       No results found for this or any previous visit (from the past 24 hour(s)).

## 2020-09-27 NOTE — PLAN OF CARE
D: S/p I&D in OR 9/23 for left former ICD pocket infection and former CT side infection. Hx of end-stage ICM now s/p heart txp 7/20, CAD s/p CABG 2008, DM2, BPH     I/A: A/Ox4. VSS on 2L NC overnight. BPs slightly elevated. Declining pain meds. Chest wound packing changed x3 this shift d/t moderate bleeding that gets on gown and sheets. Utilizing quick clot and holding pressure for 10-15 minutes. MD called a notified that pt had 14 beats of afib and that pt was bleeding a notable amount. MD came to bedside to assess pt, pt appearing pale. Requested that morning labs were to be drawn early, Hgb 7.3. Tolerating consistent CHO diet with 2L FR, BG check q4hr. Voiding adequately. Up SBA.    P: TID and PRN dressing changes. WOC consult placed, possible wound vac. Continue to monitor and notify Cards 2/ CVTS with changes/concerns.

## 2020-09-27 NOTE — PLAN OF CARE
AOx4/[place] speaking, VSS RA-2L NC slight HTN at times, Tele; SR, up SBA -  +3 BLE edema, stool softeners held today d/t diarrhea overnight.  Wound care done per orders - around 6pm pt's ICD wound site began to bleed profusely through dressings, silver nitrate x2 did not stop bleeding, quick clot applied and then a new one packed in wound, pt became diaphoretic and very pale- Cards 2 called and updated - orders to just monitor BP closely and mentation status w/new lab orders put in. Continue to monitor closely and update Cards 2 w/any concerns or changes

## 2020-09-27 NOTE — PROVIDER NOTIFICATION
MD Notification    Notified Person: MD    Notified Person Name: ELLISusmanilyachristos MD Erickson     Notification Date/Time: 9/27/20 8994    Notification Interaction: Call    Purpose of Notification: Pt's Subclavian wound dressing has been saturated and changed 4x overnight; last at 7am.  Pt is pale and diaphoretic.  Hgb dropped to 7.3, ANC dropped to 1.1.  Please advise    Orders Received: MD will come assess pt    Comments:

## 2020-09-28 ENCOUNTER — ANESTHESIA EVENT (OUTPATIENT)
Dept: SURGERY | Facility: CLINIC | Age: 65
End: 2020-09-28

## 2020-09-28 ENCOUNTER — ANESTHESIA (OUTPATIENT)
Dept: SURGERY | Facility: CLINIC | Age: 65
End: 2020-09-28
Payer: COMMERCIAL

## 2020-09-28 ENCOUNTER — APPOINTMENT (OUTPATIENT)
Dept: MEDSURG UNIT | Facility: CLINIC | Age: 65
DRG: 857 | End: 2020-09-28
Attending: INTERNAL MEDICINE
Payer: COMMERCIAL

## 2020-09-28 LAB
ANION GAP SERPL CALCULATED.3IONS-SCNC: 4 MMOL/L (ref 3–14)
ANISOCYTOSIS BLD QL SMEAR: SLIGHT
BACTERIA SPEC CULT: NO GROWTH
BASOPHILS # BLD AUTO: 0 10E9/L (ref 0–0.2)
BASOPHILS NFR BLD AUTO: 0 %
BUN SERPL-MCNC: 32 MG/DL (ref 7–30)
CALCIUM SERPL-MCNC: 8.2 MG/DL (ref 8.5–10.1)
CHLORIDE SERPL-SCNC: 110 MMOL/L (ref 94–109)
CO2 SERPL-SCNC: 26 MMOL/L (ref 20–32)
COPATH REPORT: NORMAL
CREAT SERPL-MCNC: 1.43 MG/DL (ref 0.66–1.25)
DACRYOCYTES BLD QL SMEAR: SLIGHT
DIFFERENTIAL METHOD BLD: ABNORMAL
EOSINOPHIL # BLD AUTO: 0.1 10E9/L (ref 0–0.7)
EOSINOPHIL NFR BLD AUTO: 2.6 %
ERYTHROCYTE [DISTWIDTH] IN BLOOD BY AUTOMATED COUNT: 19.9 % (ref 10–15)
ERYTHROCYTE [DISTWIDTH] IN BLOOD BY AUTOMATED COUNT: 20.1 % (ref 10–15)
GFR SERPL CREATININE-BSD FRML MDRD: 50 ML/MIN/{1.73_M2}
GLUCOSE BLDC GLUCOMTR-MCNC: 139 MG/DL (ref 70–99)
GLUCOSE BLDC GLUCOMTR-MCNC: 140 MG/DL (ref 70–99)
GLUCOSE BLDC GLUCOMTR-MCNC: 152 MG/DL (ref 70–99)
GLUCOSE BLDC GLUCOMTR-MCNC: 155 MG/DL (ref 70–99)
GLUCOSE BLDC GLUCOMTR-MCNC: 225 MG/DL (ref 70–99)
GLUCOSE BLDC GLUCOMTR-MCNC: 322 MG/DL (ref 70–99)
GLUCOSE BLDC GLUCOMTR-MCNC: 394 MG/DL (ref 70–99)
GLUCOSE BLDC GLUCOMTR-MCNC: 411 MG/DL (ref 70–99)
GLUCOSE SERPL-MCNC: 161 MG/DL (ref 70–99)
HCT VFR BLD AUTO: 24.2 % (ref 40–53)
HCT VFR BLD AUTO: 25.6 % (ref 40–53)
HGB BLD-MCNC: 7.3 G/DL (ref 13.3–17.7)
HGB BLD-MCNC: 8.1 G/DL (ref 13.3–17.7)
INR PPP: 1.19 (ref 0.86–1.14)
LYMPHOCYTES # BLD AUTO: 0.3 10E9/L (ref 0.8–5.3)
LYMPHOCYTES NFR BLD AUTO: 14.8 %
MAGNESIUM SERPL-MCNC: 1.7 MG/DL (ref 1.6–2.3)
MCH RBC QN AUTO: 29.8 PG (ref 26.5–33)
MCH RBC QN AUTO: 31.5 PG (ref 26.5–33)
MCHC RBC AUTO-ENTMCNC: 30.2 G/DL (ref 31.5–36.5)
MCHC RBC AUTO-ENTMCNC: 31.6 G/DL (ref 31.5–36.5)
MCV RBC AUTO: 100 FL (ref 78–100)
MCV RBC AUTO: 99 FL (ref 78–100)
MONOCYTES # BLD AUTO: 0.1 10E9/L (ref 0–1.3)
MONOCYTES NFR BLD AUTO: 7 %
NEUTROPHILS # BLD AUTO: 1.5 10E9/L (ref 1.6–8.3)
NEUTROPHILS NFR BLD AUTO: 75.6 %
OVALOCYTES BLD QL SMEAR: SLIGHT
PLATELET # BLD AUTO: 189 10E9/L (ref 150–450)
PLATELET # BLD AUTO: 222 10E9/L (ref 150–450)
PLATELET # BLD EST: ABNORMAL 10*3/UL
POIKILOCYTOSIS BLD QL SMEAR: ABNORMAL
POLYCHROMASIA BLD QL SMEAR: SLIGHT
POTASSIUM SERPL-SCNC: 5.1 MMOL/L (ref 3.4–5.3)
RBC # BLD AUTO: 2.45 10E12/L (ref 4.4–5.9)
RBC # BLD AUTO: 2.57 10E12/L (ref 4.4–5.9)
RBC INCLUSIONS BLD: SLIGHT
SODIUM SERPL-SCNC: 139 MMOL/L (ref 133–144)
SPECIMEN SOURCE: NORMAL
TACROLIMUS BLD-MCNC: 12.8 UG/L (ref 5–15)
TME LAST DOSE: NORMAL H
WBC # BLD AUTO: 2 10E9/L (ref 4–11)
WBC # BLD AUTO: 2.1 10E9/L (ref 4–11)

## 2020-09-28 PROCEDURE — 40000901 ZZH STATISTIC WOC PT EDUCATION, 0-15 MIN

## 2020-09-28 PROCEDURE — 25000132 ZZH RX MED GY IP 250 OP 250 PS 637: Performed by: STUDENT IN AN ORGANIZED HEALTH CARE EDUCATION/TRAINING PROGRAM

## 2020-09-28 PROCEDURE — 02BK3ZX EXCISION OF RIGHT VENTRICLE, PERCUTANEOUS APPROACH, DIAGNOSTIC: ICD-10-PCS | Performed by: INTERNAL MEDICINE

## 2020-09-28 PROCEDURE — 36415 COLL VENOUS BLD VENIPUNCTURE: CPT | Performed by: STUDENT IN AN ORGANIZED HEALTH CARE EDUCATION/TRAINING PROGRAM

## 2020-09-28 PROCEDURE — 86352 CELL FUNCTION ASSAY W/STIM: CPT | Performed by: INTERNAL MEDICINE

## 2020-09-28 PROCEDURE — 25800030 ZZH RX IP 258 OP 636: Performed by: NURSE ANESTHETIST, CERTIFIED REGISTERED

## 2020-09-28 PROCEDURE — 93505 ENDOMYOCARDIAL BIOPSY: CPT | Performed by: INTERNAL MEDICINE

## 2020-09-28 PROCEDURE — 37000008 ZZH ANESTHESIA TECHNICAL FEE, 1ST 30 MIN: Performed by: SURGERY

## 2020-09-28 PROCEDURE — 21400000 ZZH R&B CCU UMMC

## 2020-09-28 PROCEDURE — 25000131 ZZH RX MED GY IP 250 OP 636 PS 637: Performed by: STUDENT IN AN ORGANIZED HEALTH CARE EDUCATION/TRAINING PROGRAM

## 2020-09-28 PROCEDURE — 80197 ASSAY OF TACROLIMUS: CPT | Performed by: STUDENT IN AN ORGANIZED HEALTH CARE EDUCATION/TRAINING PROGRAM

## 2020-09-28 PROCEDURE — 25000128 H RX IP 250 OP 636: Performed by: NURSE ANESTHETIST, CERTIFIED REGISTERED

## 2020-09-28 PROCEDURE — 25000128 H RX IP 250 OP 636: Performed by: INTERNAL MEDICINE

## 2020-09-28 PROCEDURE — 88346 IMFLUOR 1ST 1ANTB STAIN PX: CPT | Performed by: INTERNAL MEDICINE

## 2020-09-28 PROCEDURE — 25000125 ZZHC RX 250: Performed by: SURGERY

## 2020-09-28 PROCEDURE — 25000128 H RX IP 250 OP 636: Performed by: STUDENT IN AN ORGANIZED HEALTH CARE EDUCATION/TRAINING PROGRAM

## 2020-09-28 PROCEDURE — 93505 ENDOMYOCARDIAL BIOPSY: CPT | Mod: 26 | Performed by: INTERNAL MEDICINE

## 2020-09-28 PROCEDURE — 27210794 ZZH OR GENERAL SUPPLY STERILE: Performed by: INTERNAL MEDICINE

## 2020-09-28 PROCEDURE — 83735 ASSAY OF MAGNESIUM: CPT | Performed by: STUDENT IN AN ORGANIZED HEALTH CARE EDUCATION/TRAINING PROGRAM

## 2020-09-28 PROCEDURE — 4A023N6 MEASUREMENT OF CARDIAC SAMPLING AND PRESSURE, RIGHT HEART, PERCUTANEOUS APPROACH: ICD-10-PCS | Performed by: INTERNAL MEDICINE

## 2020-09-28 PROCEDURE — 36000057 ZZH SURGERY LEVEL 3 1ST 30 MIN - UMMC: Performed by: SURGERY

## 2020-09-28 PROCEDURE — 85610 PROTHROMBIN TIME: CPT | Performed by: STUDENT IN AN ORGANIZED HEALTH CARE EDUCATION/TRAINING PROGRAM

## 2020-09-28 PROCEDURE — 36415 COLL VENOUS BLD VENIPUNCTURE: CPT | Performed by: INTERNAL MEDICINE

## 2020-09-28 PROCEDURE — 0JB60ZZ EXCISION OF CHEST SUBCUTANEOUS TISSUE AND FASCIA, OPEN APPROACH: ICD-10-PCS | Performed by: SURGERY

## 2020-09-28 PROCEDURE — 25000125 ZZHC RX 250: Performed by: INTERNAL MEDICINE

## 2020-09-28 PROCEDURE — 25000131 ZZH RX MED GY IP 250 OP 636 PS 637: Performed by: INTERNAL MEDICINE

## 2020-09-28 PROCEDURE — 37000009 ZZH ANESTHESIA TECHNICAL FEE, EACH ADDTL 15 MIN: Performed by: SURGERY

## 2020-09-28 PROCEDURE — 85027 COMPLETE CBC AUTOMATED: CPT | Performed by: STUDENT IN AN ORGANIZED HEALTH CARE EDUCATION/TRAINING PROGRAM

## 2020-09-28 PROCEDURE — 80048 BASIC METABOLIC PNL TOTAL CA: CPT | Performed by: STUDENT IN AN ORGANIZED HEALTH CARE EDUCATION/TRAINING PROGRAM

## 2020-09-28 PROCEDURE — C1894 INTRO/SHEATH, NON-LASER: HCPCS | Performed by: INTERNAL MEDICINE

## 2020-09-28 PROCEDURE — 27210794 ZZH OR GENERAL SUPPLY STERILE: Performed by: SURGERY

## 2020-09-28 PROCEDURE — 88307 TISSUE EXAM BY PATHOLOGIST: CPT | Performed by: INTERNAL MEDICINE

## 2020-09-28 PROCEDURE — 25000132 ZZH RX MED GY IP 250 OP 250 PS 637: Performed by: INTERNAL MEDICINE

## 2020-09-28 PROCEDURE — 99232 SBSQ HOSP IP/OBS MODERATE 35: CPT | Mod: 25 | Performed by: INTERNAL MEDICINE

## 2020-09-28 PROCEDURE — 00000146 ZZHCL STATISTIC GLUCOSE BY METER IP

## 2020-09-28 PROCEDURE — 40000170 ZZH STATISTIC PRE-PROCEDURE ASSESSMENT II: Performed by: SURGERY

## 2020-09-28 PROCEDURE — 85025 COMPLETE CBC W/AUTO DIFF WBC: CPT | Performed by: INTERNAL MEDICINE

## 2020-09-28 PROCEDURE — 88350 IMFLUOR EA ADDL 1ANTB STN PX: CPT | Performed by: INTERNAL MEDICINE

## 2020-09-28 PROCEDURE — 36000059 ZZH SURGERY LEVEL 3 EA 15 ADDTL MIN UMMC: Performed by: SURGERY

## 2020-09-28 RX ORDER — ONDANSETRON 2 MG/ML
INJECTION INTRAMUSCULAR; INTRAVENOUS PRN
Status: DISCONTINUED | OUTPATIENT
Start: 2020-09-28 | End: 2020-09-28

## 2020-09-28 RX ORDER — SODIUM CHLORIDE 9 MG/ML
INJECTION, SOLUTION INTRAVENOUS CONTINUOUS PRN
Status: DISCONTINUED | OUTPATIENT
Start: 2020-09-28 | End: 2020-09-28

## 2020-09-28 RX ORDER — SODIUM HYPOCHLORITE 5 MG/ML
SOLUTION TOPICAL 2 TIMES DAILY
Status: DISCONTINUED | OUTPATIENT
Start: 2020-09-28 | End: 2020-09-28

## 2020-09-28 RX ORDER — LIDOCAINE HYDROCHLORIDE 10 MG/ML
INJECTION, SOLUTION EPIDURAL; INFILTRATION; INTRACAUDAL; PERINEURAL PRN
Status: DISCONTINUED | OUTPATIENT
Start: 2020-09-28 | End: 2020-09-28 | Stop reason: HOSPADM

## 2020-09-28 RX ORDER — FENTANYL CITRATE 50 UG/ML
INJECTION, SOLUTION INTRAMUSCULAR; INTRAVENOUS PRN
Status: DISCONTINUED | OUTPATIENT
Start: 2020-09-28 | End: 2020-09-28

## 2020-09-28 RX ORDER — SODIUM HYPOCHLORITE 5 MG/ML
SOLUTION TOPICAL 3 TIMES DAILY
Status: DISCONTINUED | OUTPATIENT
Start: 2020-09-29 | End: 2020-10-03

## 2020-09-28 RX ORDER — PROPOFOL 10 MG/ML
INJECTION, EMULSION INTRAVENOUS CONTINUOUS PRN
Status: DISCONTINUED | OUTPATIENT
Start: 2020-09-28 | End: 2020-09-28

## 2020-09-28 RX ADMIN — CLOTRIMAZOLE 1 LOZENGE: 10 LOZENGE ORAL at 15:05

## 2020-09-28 RX ADMIN — CIPROFLOXACIN HYDROCHLORIDE 500 MG: 500 TABLET, FILM COATED ORAL at 19:34

## 2020-09-28 RX ADMIN — TACROLIMUS 3 MG: 1 CAPSULE ORAL at 08:57

## 2020-09-28 RX ADMIN — ROSUVASTATIN CALCIUM 10 MG: 10 TABLET, FILM COATED ORAL at 17:31

## 2020-09-28 RX ADMIN — PREDNISONE 10 MG: 10 TABLET ORAL at 08:56

## 2020-09-28 RX ADMIN — Medication 1 TABLET: at 17:32

## 2020-09-28 RX ADMIN — VALGANCICLOVIR 450 MG: 450 TABLET, FILM COATED ORAL at 08:57

## 2020-09-28 RX ADMIN — INSULIN ASPART 1 UNITS: 100 INJECTION, SOLUTION INTRAVENOUS; SUBCUTANEOUS at 15:05

## 2020-09-28 RX ADMIN — HYDRALAZINE HYDROCHLORIDE 75 MG: 25 TABLET ORAL at 19:34

## 2020-09-28 RX ADMIN — TACROLIMUS 3.5 MG: 1 CAPSULE ORAL at 17:32

## 2020-09-28 RX ADMIN — PREDNISONE 5 MG: 5 TABLET ORAL at 19:34

## 2020-09-28 RX ADMIN — MIDAZOLAM 2 MG: 1 INJECTION INTRAMUSCULAR; INTRAVENOUS at 12:44

## 2020-09-28 RX ADMIN — PANTOPRAZOLE SODIUM 40 MG: 40 TABLET, DELAYED RELEASE ORAL at 08:57

## 2020-09-28 RX ADMIN — MAGNESIUM OXIDE 400 MG: 400 TABLET ORAL at 19:34

## 2020-09-28 RX ADMIN — HYDRALAZINE HYDROCHLORIDE 75 MG: 25 TABLET ORAL at 11:03

## 2020-09-28 RX ADMIN — FONDAPARINUX SODIUM 7.5 MG: 7.5 INJECTION, SOLUTION SUBCUTANEOUS at 19:33

## 2020-09-28 RX ADMIN — PROPOFOL 100 MCG/KG/MIN: 10 INJECTION, EMULSION INTRAVENOUS at 12:50

## 2020-09-28 RX ADMIN — CLOTRIMAZOLE 1 LOZENGE: 10 LOZENGE ORAL at 19:33

## 2020-09-28 RX ADMIN — ONDANSETRON 4 MG: 2 INJECTION INTRAMUSCULAR; INTRAVENOUS at 12:54

## 2020-09-28 RX ADMIN — FENTANYL CITRATE 50 MCG: 50 INJECTION, SOLUTION INTRAMUSCULAR; INTRAVENOUS at 12:52

## 2020-09-28 RX ADMIN — SODIUM CHLORIDE: 9 INJECTION, SOLUTION INTRAVENOUS at 12:39

## 2020-09-28 RX ADMIN — CIPROFLOXACIN HYDROCHLORIDE 500 MG: 500 TABLET, FILM COATED ORAL at 08:57

## 2020-09-28 RX ADMIN — ACETAMINOPHEN 650 MG: 325 TABLET, FILM COATED ORAL at 15:07

## 2020-09-28 RX ADMIN — DOCUSATE SODIUM 50 MG AND SENNOSIDES 8.6 MG 1 TABLET: 8.6; 5 TABLET, FILM COATED ORAL at 19:34

## 2020-09-28 RX ADMIN — MAGNESIUM SULFATE 2 G: 2 INJECTION INTRAVENOUS at 06:32

## 2020-09-28 RX ADMIN — SULFAMETHOXAZOLE AND TRIMETHOPRIM 1 TABLET: 400; 80 TABLET ORAL at 08:56

## 2020-09-28 RX ADMIN — HYDRALAZINE HYDROCHLORIDE 75 MG: 25 TABLET ORAL at 03:16

## 2020-09-28 ASSESSMENT — ACTIVITIES OF DAILY LIVING (ADL)
ADLS_ACUITY_SCORE: 14

## 2020-09-28 NOTE — OR NURSING
Writer entered chart to assist with inpatient MAC charting; vitals and Philly score. CRNA called report to floor. Awaiting transport back to .

## 2020-09-28 NOTE — PLAN OF CARE
D: S/p I&D in OR 9/23 for left former ICD pocket infection and former CT side infection. Hx of end-stage ICM now s/p heart txp 7/20, CAD s/p CABG 2008, DM2, BPH     I/A: A/Ox4. VSS on 2L NC overnight. BPs slightly elevated. Declining pain meds. Chest and abdominal wound packing changed x1 last night, no significant bleeding noted, dressing CDI. Voiding adequately. Up SBA.    P: NPO since MN for repeat I&D and RHC/biopsy. TID and PRN dressing changes. Continue to monitor and notify Cards 2/ CVTS with changes/concerns.

## 2020-09-28 NOTE — ANESTHESIA PREPROCEDURE EVALUATION
Anesthesia Pre-Procedure Evaluation    Patient: Lucian Henderson .   MRN:     [id number] Gender:   male   Age:    64 year old :      1955        Preoperative Diagnosis: Status post cardiac surgery [Z98.890]   Procedure(s):  IRRIGATION AND DEBRIDEMENT OF LEFT UPPER CHEST WOUND.     LABS:  CBC:   Lab Results   Component Value Date    WBC 2.0 (L) 2020    WBC 1.9 (L) 2020    HGB 7.3 (L) 2020    HGB 7.7 (L) 2020    HCT 24.2 (L) 2020    HCT 25.0 (L) 2020     2020     2020     BMP:   Lab Results   Component Value Date     2020     2020    POTASSIUM 5.1 2020    POTASSIUM 4.8 2020    CHLORIDE 110 (H) 2020    CHLORIDE 110 (H) 2020    CO2 26 2020    CO2 22 2020    BUN 32 (H) 2020    BUN 36 (H) 2020    CR 1.43 (H) 2020    CR 1.38 (H) 2020     (H) 2020     (H) 2020     COAGS:   Lab Results   Component Value Date    PTT 31 2020    INR 1.19 (H) 2020    FIBR 295 2020     POC:   Lab Results   Component Value Date     (H) 2020     OTHER:   Lab Results   Component Value Date    PH 7.47 (H) 2020    LACT 1.3 2020    A1C 8.2 (H) 2020    BRYANNA 8.2 (L) 2020    PHOS 1.8 (L) 2020    MAG 1.7 2020    ALBUMIN 2.5 (L) 2020    PROTTOTAL 5.5 (L) 2020    ALT 26 2020    AST 17 2020    ALKPHOS 93 2020    BILITOTAL 0.3 2020    AMYLASE 36 2020    TSH 0.80 2020    CRP 16.0 (H) 2020        Preop Vitals    BP Readings from Last 3 Encounters:   20 122/64   20 (!) 142/72   20 135/70    Pulse Readings from Last 3 Encounters:   20 75   20 99   20 95      Resp Readings from Last 3 Encounters:   20 16   20 16   20 18    SpO2 Readings from Last 3 Encounters:   20 95%   20 99%   20 97%     "  Temp Readings from Last 1 Encounters:   09/28/20 36.4  C (97.6  F) (Oral)    Ht Readings from Last 1 Encounters:   08/17/20 1.651 m (5' 5\")      Wt Readings from Last 1 Encounters:   09/27/20 84.2 kg (185 lb 11.2 oz)    Estimated body mass index is 30.9 kg/m  as calculated from the following:    Height as of 8/24/20: 1.651 m (5' 5\").    Weight as of this encounter: 84.2 kg (185 lb 11.2 oz).     LDA:  Peripheral IV 09/22/20 Left Upper forearm (Active)   Site Assessment [facility] 09/28/20 1138   Line Status Saline locked 09/28/20 1138   Phlebitis Scale 0-->no symptoms 09/28/20 0800   Infiltration Scale 0 09/28/20 0800   Infiltration Site Treatment Method  None 09/24/20 2347   Extravasation? No 09/27/20 2100   Number of days: 6       Right Jugular Interventional Procedure Access (Active)   Site Assessment [facility] 09/28/20 1500   CMS Right Arm [facility] 09/28/20 1500   Radial Pulse - Right Arm Normal 09/28/20 1500   Number of days: 0       Supraglottic Airway Nasopharyngeal 32 Fr (Active)   Number of days:         Past Medical History:   Diagnosis Date     CAD (coronary artery disease)      CHF (congestive heart failure) (H)      CKD (chronic kidney disease), stage III (H)      Cortical cataract of both eyes      Diabetes (H)      Hyperlipidemia      Hypertension      Ischemic cardiomyopathy      Obesity      CHANTEL (obstructive sleep apnea)     occas cpap     Osteoarthritis       Past Surgical History:   Procedure Laterality Date     C CABG, ARTERY-VEIN, THREE  02/2008     CATARACT IOL, RT/LT       COLONOSCOPY N/A 8/7/2019    Procedure: COLONOSCOPY, WITH POLYPECTOMY AND BIOPSY;  Surgeon: Chauncey Morataya MD;  Location:  GI     CV ANGIOGRAM CORONARY GRAFT N/A 7/2/2020    Procedure: Angiogram Coronary Graft;  Surgeon: Alex Lantigua MD;  Location: [facility] CARDIAC CATH LAB     CV CORONARY ANGIOGRAM N/A 7/2/2020    Procedure: CV CORONARY ANGIOGRAM;  Surgeon: Alex Lantigua MD;  Location:  HEART CARDIAC CATH LAB "     CV HEART BIOPSY N/A 7/27/2020    Procedure: Heart Biopsy;  Surgeon: Mario Burr MD;  Location: [facility] HEART CARDIAC CATH LAB     CV HEART BIOPSY N/A 8/3/2020    Procedure: CV HEART BIOPSY;  Surgeon: Alex Lantigua MD;  Location: [facility] CARDIAC CATH LAB     CV HEART BIOPSY N/A 8/10/2020    Procedure: CV HEART BIOPSY;  Surgeon: Mario Burr MD;  Location: [facility] HEART CARDIAC CATH LAB     CV HEART BIOPSY N/A 8/17/2020    Procedure: CV HEART BIOPSY;  Surgeon: Raimundo Hudson MD;  Location: [facility] HEART CARDIAC CATH LAB     CV HEART BIOPSY N/A 8/31/2020    Procedure: CV HEART BIOPSY;  Surgeon: Moises Santos MD;  [facility] CARDIAC CATH LAB     CV HEART BIOPSY N/A 9/14/2020    Procedure: CV HEART BIOPSY;  Surgeon: Raimundo Hudson MD;  [facility] CARDIAC CATH LAB     CV INTRA-AORTIC BALLOON PUMP INSERTION N/A 7/14/2020    Procedure: RHC WITH LEAVE IN SWAN/IABP;  Surgeon: Sonny Saleh MD;  Location: [facility] CARDIAC CATH LAB     CV RIGHT HEART CATH N/A 3/25/2019    Procedure: CV RIGHT HEART CATH;  Surgeon: Moises Santos MD;  Location:  HEART CARDIAC CATH LAB     CV RIGHT HEART CATH N/A 7/10/2019    Procedure: CV RIGHT HEART CATH;  Surgeon: Jak Mccabe MD;  Location[facility] CARDIAC CATH LAB     CV RIGHT HEART CATH N/A 7/8/2020    Procedure: Right Heart Cath with Leave In swan already has ICU load;  Surgeon: Jak Mccabe MD;  [facility] CARDIAC CATH LAB     CV RIGHT HEART CATH N/A 7/14/2020    Procedure: CV RIGHT HEART CATH;  Surgeon: Sonny Saleh MD;  Location:  HEART CARDIAC CATH LAB     CV RIGHT HEART CATH N/A 7/2/2020    Procedure: CV RIGHT HEART CATH;  Surgeon: Alex Lantigua MD;  [facility] CARDIAC CATH LAB     CV RIGHT HEART CATH N/A 7/27/2020    Procedure: Right Heart Cath;  Surgeon: Mario Burr MD;  Location:  HEART CARDIAC CATH LAB     CV RIGHT HEART CATH N/A 8/3/2020    Procedure:  Right Heart Cath;  Surgeon: Alex Lantigua MD;  Location:  HEART CARDIAC CATH LAB     CV RIGHT HEART CATH N/A 8/10/2020    Procedure: CV RIGHT HEART CATH;  Surgeon: Mario Burr MD;  Location:  HEART CARDIAC CATH LAB     CV RIGHT HEART CATH N/A 8/17/2020    Procedure: CV RIGHT HEART CATH;  Surgeon: Raimundo Hudson MD;  Location:  HEART CARDIAC CATH LAB     CV RIGHT HEART CATH N/A 8/31/2020    Procedure: CV RIGHT HEART CATH;  Surgeon: Moises Santos MD;  Location:  HEART CARDIAC CATH LAB     CV RIGHT HEART CATH N/A 9/14/2020    Procedure: CV RIGHT HEART CATH;  Surgeon: Raimundo Hudson MD;  Location:  HEART CARDIAC CATH LAB     EXTRACTION(S) DENTAL Left 7/13/2020    Procedure: EXTRACTION, TOOTH #11, 12, 13, 15, and 29;  Surgeon: Moniac Chao DDS;  Location: [facility] OR     IMPLANT AUTOMATIC IMPLANTABLE CARDIOVERTER DEFIBRILLATOR       INCISION AND DRAINAGE STERNUM W/ IRRIGATION SYSTEM, COMBINED N/A 9/23/2020    Procedure: INCISION AND DRAINAGE, LEFT SUPRACLAVICULAR WOUND INFECTION AND ABDOMENN WOUND.;  Surgeon: Ran Huertas MD;  [facility] OR     INSERT INTRAAORTIC BALLOON PUMP N/A 7/16/2020    Procedure: Subclavian Intra-Aortic Balloon Pump Placement;  Surgeon: Allan Sparrow MD;  [facility]     PHACOEMULSIFICATION CLEAR CORNEA WITH STANDARD IOL, VITRECTOMY PARSPLANA 25 GAGUE, COMBINED Left 12/10/2019    Procedure: PHACOEMULSIFICATION, CATARACT, CLEAR CORNEAL INCISION APPROACH, W STD INTRAOCULAR LENS IMPLANT INSERT + VITRECTOMY BY PARS PLANA  USING 25-GAUGE INSTRUMENTS. ENDOLASER, INFUSION OF 20% SF6 GAS;  Surgeon: Triny Bunch MD;  [facility]     PICC DOUBLE LUMEN PLACEMENT Left 07/28/2020    5Fr - 42cm, Basilic vein, mid SVC     PICC INSERTION Right 07/11/2020    basilic 44 cm total      TRANSPLANT HEART RECIPIENT N/A 7/19/2020    Procedure: REDO MEDIAN STERNOTOMY, TRANSPLANT, ORTHOTOPIC HEART, RECIPIENT, ON PUMP OXYGENATOR, REMOVAL OF  CARDIAC DEFIBRILLATOR AND LEAD;  Surgeon: Griselli, Massimo, MD;  Location: [facility] OR      Allergies   Allergen Reactions     Heparin Heparin Induced Thrombocytopenia     HIT screen sent 7/18/2020. CORRINE confirmed positive             JZG FV AN PHYSICAL EXAM    Assessment:   ASA SCORE: 3    H&P: History and physical reviewed and following examination; no interval change.   Smoking Status:  Non-Smoker/Unknown   NPO Status: NPO Appropriate     Plan:   Anes. Type:  General; MAC   Pre-Medication: None   Induction:  IV (Standard)   Airway: Native Airway   Access/Monitoring: PIV   Maintenance: Balanced     Postop Plan:   Postop Pain: Opioids  Postop Sedation/Airway: Not planned     PONV Management:   Adult Risk Factors:, Non-Smoker, Postop Opioids   Prevention: Ondansetron     CONSENT: Direct conversation   Plan and risks discussed with: Patient   Blood Products: Consent Deferred (Minimal Blood Loss)                   Tigist Webber MD

## 2020-09-28 NOTE — PROGRESS NOTES
[facility]    Transplant Infectious Diseases Inpatient Progress Note      Lucian Henderson . MRN# [id number]   YOB: 1955 Age: 64 year old   Date of Admission and time: 9/22/2020 12:31 PM             Recommendations: 1.   Continue ciprofloxacin 500mg PO q12hrs for 2 weeks from day of debridement (9/23/2020), last dose to be given PM of 10/7/2020.  2. No additional ABx needed, positive BCx from 9/22 is contaminant.         Summary of Presentation:   Transplants:  7/19/2020 (Heart), Postoperative day:  71     This patient is a 64 year old male with ICMP s/p OHT with basiliximab induction and TAC/MMF/prednisone maintenance.   Course complicated by DGF, HIT, RUE DVT.   Presented from cardiology clinic with wound infection.         Active Problems and Infectious Diseases Issues:   1. Probable Cutibacterium species in one set out of two from blood cx 9/22/2020  Cutibacterium growing in one set out of two on day #4 is a contaminant.   This will not contaminate the graft in the right subclavian area.     2. Surgical wound infection involving the left subclavian wound (prior ICD site) and the abdominal wound (prior drain site) with P aeruginosa.  The Coag Neg Staph is of no clinical value and does not need to be covered.   The S mitis on drainage only, less likely to be significant as tissue cultures with Pseudomonas only.    Underwent surgical debridement 9/23/20. Cultures with Pseudomonas aeruginosa (pansusceptible).      The chimney graft in the right subclavian area does not seem to be involved. But will follow on blood cx for the possibility of secondary seeding.   ID will follow with you.     3. EBV viremia  No signs or symptoms for PTLD.   Monitor EBV by PCR per your protocol.         Old Problems and Infectious Diseases Issues:   1. Donor with S salivarius positive blood cx; the patient received the usual perioperative ABx then ceftriaxone for total ABx of 7 days.    2. Donor sputum with P fluorescens that failed to grow and Enterobacter spp. The recipient was not treated for these organisms.      Other Infectious Disease issues include:  - QTc 420 as of 9/22/202.   - PCP prophylaxis: Bactrim   - Serostatus: CMV D+/R+, EBV D+/R+, HSV1+/2-, VZV +  On VGCV for universal ppx.   - Immunization status: Too soon to be discussed.   - Gamma globulin status: not recently checked        Attestation:  I interviewed the patient and obtained history from the patient and by reviewing the patient's chart including outside records, microbiological data, and radiological data. All data are summarized in this note.    Meme Butterfield PA-C  Infectious Disease  Pager # [phone]    09/28/2020           Interim History of Present Illness:   No acute events overnight. Went to cath lab then OR for I&D of hematoma today.         History of Present Illness:   Transplants:  7/19/2020 (Heart), Postoperative day:  71     This patient is a 64 year old male with ICMP s/p OHT with basiliximab induction and TAC/MMF/prednisone maintenance. He had ICD removed from the left subclavian area and right subclavian IABP placed then removed with chimney graft in place.   Course complicated by DGF, HIT, RUE DVT.  The patient presented to the transplant clinic 9/22/20 and was found to have erythema, swelling, tenderness and warmth of the left subclavian and midline abdominal wounds. He was sent to [facility] where blood cx were obtained, wound swab of the left subclavian wound were sent. The patient was then started on zosyn and vancomycin.     ID consulted.     The patient stated that 5 days before admission, he started to develop pain in the involved areas for which he took tylenol. He denied fever or chills. He denied seeing drainage. Denied trauma to the areas.     Exposure History  Was born in [place], stated that he lived for [place] in the [place], lived in [place] for 50-60 years.   He lives in [place], [place].  Lives with wife and one dog.   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   He stated he was tested for TB in the remote past and was negative.   He denied exposure to TB.   No institutionalization.   He travels to [place] frequently. His last travel outside of [place] was to [place] 2 years ago.   No significant outdoors activities.                 Review of Systems:      As mentioned in the HPI otherwise negative by reviewing constitutional symptoms, central and peripheral neurological systems, respiratory system, cardiac system, GI system,  system, musculoskeletal, skin, allergy, and lymphatics.                Immunizations:     Immunization History   Administered Date(s) Administered     Influenza (IIV3) PF 10/05/2011, 10/15/2012     Influenza Vaccine IM > 6 months Valent IIV4 10/27/2015, 09/14/2016, 10/05/2017, 10/03/2018     Pneumococcal 23 valent 08/04/2009, 04/03/2015     TDAP Vaccine (Adacel) 08/04/2009            Allergies:     Allergies   Allergen Reactions     Heparin Heparin Induced Thrombocytopenia     HIT screen sent 7/18/2020. CORRINE confirmed positive             Medications:   Medications that Require Transfusion:     - MEDICATION INSTRUCTIONS -       - MEDICATION INSTRUCTIONS -       ACE/ARB/ARNI NOT PRESCRIBED       Scheduled Medications:     aspirin  81 mg Oral Daily     calcium carbonate 600 mg-vitamin D 400 units  1 tablet Oral BID w/meals     ciprofloxacin  500 mg Oral Q12H MUSA     clotrimazole  1 lozenge Buccal 4x Daily     fondaparinux ANTICOAGULANT  7.5 mg Subcutaneous Q24H     hydrALAZINE  75 mg Oral Q8H     insulin aspart  1-6 Units Subcutaneous Q4H     [Held by provider] insulin isophane human  35 Units Subcutaneous QAM     magnesium oxide  400 mg Oral BID     [Held by provider] mycophenolate  750 mg Oral BID     pantoprazole  40 mg Oral QAM AC     predniSONE  10 mg Oral QAM AC     predniSONE  5 mg Oral QPM     rosuvastatin  10 mg Oral Daily     senna-docusate  1 tablet  Oral BID    Or     senna-docusate  2 tablet Oral BID     sodium chloride (PF)  3 mL Intracatheter Q8H     sodium hypochlorite   Topical BID     sulfamethoxazole-trimethoprim  1 tablet Oral Daily     tacrolimus  3 mg Oral QAM     tacrolimus  3.5 mg Oral QPM     tamsulosin  0.4 mg Oral Daily     valGANciclovir  450 mg Oral Daily               Physical Exam:   Temp: 97.6  F (36.4  C) Temp src: Oral BP: 122/64 Pulse: 75   Resp: 16 SpO2: 95 % O2 Device: None (Room air) Oxygen Delivery: 2 LPM    Wt Readings from Last 4 Encounters:   09/27/20 84.2 kg (185 lb 11.2 oz)   08/28/20 76.9 kg (169 lb 8.5 oz)   08/17/20 79.8 kg (176 lb)   08/10/20 79.8 kg (176 lb)     Deferred to ID attending physician attestation.         Data:   No results found for: ACD4    Inflammatory Markers    Recent Labs   Lab Test 09/25/20  0549 09/23/20  0710 09/22/20  1256 08/25/20  0445 07/08/19  1021   CRP 16.0* 42.0* 16.0* <2.9 9.9       Immune Globulin Studies     Recent Labs   Lab Test 04/09/15  0721   IGG 1,310   IGM 38*          Metabolic Studies       Recent Labs   Lab Test 09/28/20  0513 09/27/20  0428 09/26/20  0532 09/25/20  0549 09/24/20  0537 09/23/20  1147 09/23/20  0710  09/21/20  1017 09/18/20  0954  08/17/20  0847  07/22/20  1608  07/03/20  1559    139 142 139 137  --  136   < > 133 134   < > 133   < > 140   < > 133   POTASSIUM 5.1 4.8 5.0 4.9 4.7  --  4.8   < > 4.7 4.5   < > 4.8   < > 4.9   < > 4.4   CHLORIDE 110* 110* 113* 111* 108  --  108   < > 104 107   < > 102   < > 110*   < > 102   CO2 26 22 22 23 21  --  21   < > 20 17*   < > 26   < > 25   < > 25   ANIONGAP 4 7 6 5 8  --  7   < > 9 10   < > 6   < > 5   < > 6   BUN 32* 36* 41* 36* 37*  --  36*   < > 45* 40*   < > 67*   < > 40*   < > 33*   CR 1.43* 1.38* 1.84* 1.74* 1.49*  --  1.26*   < > 1.57* 1.17   < > 1.67*   < > 1.47*   < > 1.65*   GFRESTIMATED 50* 53* 37* 40* 48*  --  59*   < > 45* 64   < > 42*   < > 49*   < > 42*   * 168* 170* 97 141*  --  203*   < >  212* 252*   < > 391*   < > 134*   < > 229*   A1C  --   --   --   --   --   --   --   --   --   --   --   --   --   --   --  8.2*   BRYANNA 8.2* 8.5 8.2* 7.8* 8.1*  --  8.2*   < > 7.9* 8.0*   < > 8.3*   < > 8.1*   < > 8.7   PHOS  --   --   --   --   --   --   --   --  1.8* 1.5*   < > 3.5   < > 2.8   < >  --    MAG 1.7 2.0 2.0 2.1 2.0  --  1.8   < > 1.1* 1.5*   < > 1.9   < > 2.3   < > 2.2   LACT  --   --   --   --   --  1.3  --   --   --   --   --   --   --  1.0   < >  --    CKT  --   --   --   --   --   --   --   --   --   --   --  76  --   --   --   --     < > = values in this interval not displayed.       Hepatic Studies    Recent Labs   Lab Test 09/22/20  1256 09/14/20  1153 08/31/20  0856 08/25/20  1705 08/24/20  2026 08/17/20  0847 08/13/20  0713  07/08/19  1021   BILITOTAL 0.3 0.3 0.4  --  0.5 0.5 0.6   < > 0.6   ALKPHOS 93 100 117  --  124 141 91   < > 126   ALBUMIN 2.5* 2.7* 2.7*  --  2.7* 2.7* 2.6*   < > 3.4   AST 17 19 13  --  12 13 16   < > 14   ALT 26 26 31  --  28 29 27   < > 20   LDH  --   --   --  433*  --   --   --   --  174    < > = values in this interval not displayed.       Pancreatitis testing    Recent Labs   Lab Test 08/17/20  0847 07/19/20  1613 07/08/19  1021 08/16/18  0834 07/05/17  0732 07/06/16  0717 06/27/15  0755   AMYLASE  --  36  --   --   --   --   --    TRIG 82  --  181* 297* 226* 225* 99       Hematology Studies      Recent Labs   Lab Test 09/28/20  0513 09/27/20  1540 09/27/20  0428 09/26/20  1837 09/26/20  0532 09/25/20  0549 09/24/20  0537   WBC 2.0* 1.9* 1.7* 1.7* 1.6* 1.7* 1.8*   ANEU 1.5*  --  1.1* 1.4* 1.3* 1.3* 1.5*   ALYM 0.3*  --  0.4* 0.2* 0.2* 0.2* 0.2*   ELAN 0.1  --  0.1 0.0 0.1 0.1 0.1   AEOS 0.1  --  0.0 0.0 0.0 0.0 0.0   HGB 7.3* 7.7* 7.3* 7.7* 7.5* 7.4* 7.4*   HCT 24.2* 25.0* 23.6* 24.7* 24.7* 24.8* 24.6*    194 173 195 192 177 166       Clotting Studies    Recent Labs   Lab Test 09/28/20  0513 09/27/20 0428 09/26/20  0532 09/25/20  0549  09/22/20  1256   08/03/20  0559 08/02/20  0641 08/01/20  0702   INR 1.19* 1.15* 1.20* 1.16*   < > 1.13   < >  --   --   --    PTT  --   --   --   --   --  31  --  29 29 25    < > = values in this interval not displayed.       Arterial Blood Gas Testing    Recent Labs   Lab Test 07/24/20  0829 07/24/20  0413 07/23/20  2109 07/23/20  0404 07/22/20  2122 07/22/20  1608 07/22/20  1206 07/22/20  0845   PH  --   --   --  7.47* 7.44 7.44 7.43 7.41   PCO2  --   --   --  37 38 39 32* 34*   PO2  --   --   --  104 111* 80 103 123*   HCO3  --   --   --  27 26 27 22 21   O2PER 1.0L 1.5L 1.5L 40  40 40 40  40 40.0 40.0  40.0        Urine Studies     Recent Labs   Lab Test 09/25/20  1241 09/22/20  1448 08/25/20  0731 07/19/20  1930 07/17/20  1402   URINEPH 5.5 5.0 5.0 6.0 5.0   NITRITE Negative Negative Negative Negative Negative   LEUKEST Negative Negative Negative Negative Large*   WBCU 0 1 0 <1 13*       Tacrolimus levels    Invalid input(s): TACROLIMUS, TAC, TACR  Transplant Immunosuppression Labs Latest Ref Rng & Units 9/28/2020 9/27/2020 9/27/2020 9/26/2020 9/26/2020   Tacro Level 5.0 - 15.0 ug/L 12.8 - - - 13.0   Tacro Level - Not Provided - - - Not Provided   Creat 0.66 - 1.25 mg/dL 1.43(H) - 1.38(H) - 1.84(H)   BUN 7 - 30 mg/dL 32(H) - 36(H) - 41(H)   WBC 4.0 - 11.0 10e9/L 2.0(L) 1.9(L) 1.7(L) 1.7(L) 1.6(L)   Neutrophil % 75.6 - 67.3 82.2 82.2   ANEU 1.6 - 8.3 10e9/L 1.5(L) - 1.1(L) 1.4(L) 1.3(L)       Microbiology:  Blood cx 1/2 with GPR   Wound cx swab with P aeruginosa and S mitis from the abdominal wound and P aeruginosa and Coag Neg Staph from the subclavian wound.   Surgical wound cx only with P aeruginosa.   Last check of C difficile  No results found for: CDBPCT    Virology:  CMV viral loads  No results found for: 30161, 01698, 11406, 11126, CMVQAL  CMV viral loads    Recent Labs   Lab Test 08/17/20  0847   CSPEC Plasma, EDTA anticoagulant   CMVLOG Not Calculated       CMV viral loads    Log IU/mL of CMVQNT   Date Value Ref  Range Status   08/17/2020 Not Calculated <2.1 [Log_IU]/mL Final       CMV resistance testing  No lab results found.  No results found for: CMVCID, CMVFOS, CMVGAN     No results found for: H6RES    EBV DNA Copies/mL   Date Value Ref Range Status   08/17/2020 766 (A) EBVNEG^EBV DNA Not Detected [Copies]/mL Final       CMV Antibody IgG   Date Value Ref Range Status   07/19/2020 >8.0 (H) 0.0 - 0.8 AI Final     Comment:     Positive  Antibody index (AI) values reflect qualitative changes in antibody   concentration that cannot be directly associated with clinical condition or   disease state.     07/08/2019 >8.0 (H) 0.0 - 0.8 AI Final     Comment:     Positive  Antibody index (AI) values reflect qualitative changes in antibody   concentration that cannot be directly associated with clinical condition or   disease state.         Toxoplasma Antibody IgG   Date Value Ref Range Status   07/08/2019 <3.0 0.0 - 7.1 IU/mL Final     Comment:     Negative- Absence of detectable Toxoplasma gondii IgG antibodies. A negative   result does not rule out acute infection.  The magnitude of the measured result is not indicative of the amount of   antibody present. The concentrations of anti-Toxoplasma gondii IgG in a given   specimen determined with assays from different manufacturers can vary due to   differences in assay methods and reagent specificity.           Imaging:  CT c/a/p 9/22/2020   IMPRESSION:   In this patient who is approximately 2 months postop orthotopic heart  transplant:   1. Left supraclavicular subcutaneous soft tissue thickening and fat  stranding is likely an infectious/inflammatory process originating  from the patient's prior left implantable cardiac defibrillator chest  wall pocket. No appreciable associated drainable fluid collection,  though this process is incompletely visualized, extending above the  field-of-view.  2. Simple appearing anterior mediastinal/pericardial fluid collection  with thin rim  enhancement but no significant associated inflammatory  fat stranding, likely a postoperative serous collection. There is no  evidence of sternal wound involvement or communication with the  presumed left supraclavicular fossa cellulitis.   3. Small region of isolated skin/subcutaneous soft tissue thickening  in the epigastric region, likely corresponding to the site of a prior  pericardial drain and suggestive of cellulitis. No drainable fluid  collection at this location.   4. Retained vascular access cannula in the right supraclavicular fossa  with an adjacent elongated subclavian artery pseudoaneurysm, contained  by surrounded by soft tissue thickening.  4. Small left and trace right pleural effusions.  5. Cholelithiasis without cholecystitis.      Meme Butterfield PA-C   Pager: [phone]  09/28/2020

## 2020-09-28 NOTE — CONSULTS
WO Nurse Inpatient Wound Assessment     S: Reason for consultation: Evaluate and treat subclavian and abdominal surgical wound    B: Per Md Notes: Lucian Henderson Sr. is a 64 year old male admitted on 9/22/2020 for left subclavian and abdominal surgical wound infections growing P. Aeruginosa s/p I&D.     A: Patient taken to surgery for I&D today - CVTS is managing wounds.    R: Wound orders placed by MD: First dressing change by CVTS on 9/29 am     Left chest incision: Wet with sterile saline to remove packing, cleanse with MicroKlenz, pat dry. If bleeding noted, pack with QuickClot, cover with gauze and tape. If no bleeding noted, pack with 1/2 inch packing, cover with gauze and tape.     Upper abdomen incision: Wet with sterile saline to remove packing, cleanse with MicroKlenz, pat dry. If bleeding noted, pack with QuickClot, cover with gauze and tape. If no bleeding noted, pack with 1/4 inch packing, cover with gauze and tape.     Change packing 3x daily and PRN for any saturated dressing     WOC Nurse follow-up plan:signing off  Nursing to notify the Provider(s) and re-consult the WOC Nurse if wound(s) deteriorates or new skin concern.      Gissel Spann BSN, RN, CWOCN

## 2020-09-28 NOTE — ANESTHESIA POSTPROCEDURE EVALUATION
Anesthesia POST Procedure Evaluation    Patient: Lucian Henderson .   MRN:     [id number] Gender:   male   Age:    64 year old :      1955        Preoperative Diagnosis: Status post cardiac surgery [Z98.890]   Procedure(s):  IRRIGATION AND DEBRIDEMENT OF LEFT UPPER CHEST WOUND.   Postop Comments: No value filed.     Anesthesia Type: No value filed.       Disposition: Admission   Postop Pain Control: Uneventful            Sign Out: Well controlled pain   PONV: No   Neuro/Psych: Uneventful            Sign Out: Acceptable/Baseline neuro status   Airway/Respiratory: Uneventful            Sign Out: AIRWAY IN SITU/Resp. Support   CV/Hemodynamics: Uneventful            Sign Out: Acceptable CV status   Other NRE: NONE   DID A NON-ROUTINE EVENT OCCUR? No         Last Anesthesia Record Vitals:  CRNA VITALS  2020 1321 - 2020 1421      2020             Resp Rate (observed):  14          Last PACU Vitals:  Vitals Value Taken Time   BP     Temp     Pulse 93 2020  3:52 PM   Resp     SpO2     Temp src     NIBP     Pulse     SpO2     Resp     Temp     Ht Rate     Temp 2     Vitals shown include unvalidated device data.      Electronically Signed By: Tigist Webber MD, 2020, 3:53 PM

## 2020-09-28 NOTE — PROGRESS NOTES
[facility]    Transplant Infectious Diseases: Chart Review Note      Lucian Henderson . MRN# [id number]   YOB: 1955 Age: 64 year old   Date of Admission and time: 9/22/2020 12:31 PM              Recommendations: 1.   Continue Ciprofloxacin 500mg PO q12hrs for 2 weeks from day of debridement (9/23), last dose to be given PM of 10/7/20.  2. No additional abx- positive blood culture (9/22), 1/2 sets with probable Cutibacterium species on 4th day of incubation is likely contaminant       Off of floor for a majority of the day for procedures, unable to see for face to face visit.       Meme Butterfield PA-C  Infectious Disease  Pager # [phone]

## 2020-09-28 NOTE — PROGRESS NOTES
Cardiology Progress Note  Lucian Henderson Sr. MRN: [id number]  Age: 64 year old, : 1955  2020      Assessment & Plan   Lucian Henderson Sr. is a 64 year old male admitted on 2020 for left subclavian and abdominal surgical wound infections growing P. Aeruginosa s/p I&D.     Changes Today:  - Had repeat I&D today.  - Had RHC w/ biopsy today.  - restarted NPH insulin this am.  - Next Tacrolimus level tomorrow.  - Restarted fondaparinux.     left subclavian surgical wound infection  Abdominal wound surgical wound infection  - Blood culture (2020): Gram positive rods on 4th day of incubation; per ID, likely a contaminant. Noted to be bleeding from left subclavian wound, per CVTS, large hematoma noted, debrided at bedside.  - wound culture:    - Abdominal wound culture  growing strep mitis and pseudomonas.    - Left chest former ICD pocket culture  growing pseudomonas.  - CRP trend: 16.0 ()->42.0 ()->16.0 ().  - continue ciprofloxacin 500 mg BID, EOT 10/7.  - CV Surgery consulted              > right subclavian site appears healed, no planned intervention   > Had repeat I&D of left chest wound in OR 2020.     ICM s/p OHT  Leukopenia, moderate neutropenia  Most recent biopsy (2020) was negative.  -Immunsuppression:              - continue Prednisone 10 mg am, 5 mg pm    > Possible taper tomorrow after review of biopsy results.              - Tacrolimus level (2020,  12.8 micrograms/l)    > trend tacro, goal 8-10 in setting of infection.     > Per pharmacy, continue Tacrolimus to 3 mg am, 3.5 mg pm; next level .              - Holding Mycophenolate 750 mg BID in setting of infection and leukopenia/neutropenia  - Prophylaxis             - Valgancyclovir 450 mg every 48 hours             - Bactrim (400-80 mg) 1 daily              - Clotrimazole 10 mg lozenge  -Hydralazine 75mg Q8H    DAYA on CKD,  non-oliguric  Cr today 1.43, baseline unclear. Is making urine. Low suspicion for obstruction. I/O and BUN/Cr ratio (22.4) suggestive of pre-renal dehydration.   - encourage oral intake.      Hypomagnesemia, likely 2/2 tacrolimus  Last Mg (09/28/2020): 1.7 mg/dl.    - Keep Mg > 2.0                          >Replacement protocol              - continue magnesium oxide 400 mg bid     Chronic normocytic anemia  possibly anemia of chronic disease. Iron studies: elevated ferritin (447 ng/ml, 09/22/2020), iron and iron binding cap are wnl (09/22/2020). Hold-off on transfusions for now given risk of rejection.               - continue to monitor.    - Peripheral smear, results pending.     T2DM  NPO at midnight. BG post-op was 411 mg/dl.  - continue pta Humulin 35 units daily.  - HDSSI.     Constipation history  - Miralax 17 g daily  - Senna-docusate 8.6-50 mg bid     BPH  - Continue PTA Tamsulosin 0.4 mg daily     h/o DVT  h/o HIT  - continue PTA Fondaparinux 7.5 mg daily.      Diet: Carb-restricted diet.   DVT Prophylaxis:Fondaparinux 7.5 mg daily.  GI prophylaxis: Pantoprazole 40 mg daily  Osteoporosis: Calcium carbonate -Vitamin D 600-400mg daily  CAV: Rosuvastatin 10 mg daily  Calderon Catheter: not present  Code Status: Full code  Fluids: None  Lines: PIV    The patient's care was discussed with the Attending Physician, Dr. Holder.    Erickson Kelly MD  Internal Medicine, PGY-1  [facility], [place]  Pager: [phone]    Interval History   No significant bleeding from chest wound overnight.  No complaints this am.    Physical Exam   Temp: 98.6  F (37  C) Temp src: Axillary BP: 139/67 Pulse: 82   Resp: 16 SpO2: 100 % O2 Device: Nasal cannula Oxygen Delivery: 2 LPM  Vitals:    09/25/20 0613 09/26/20 0355 09/27/20 0330   Weight: 83.1 kg (183 lb 4.8 oz) 84.6 kg (186 lb 8 oz) 84.2 kg (185 lb 11.2 oz)     Vital Signs with Ranges  Temp:  [98.3  F (36.8  C)-99  F (37.2  C)] 98.6  F (37  C)  Pulse:   [80-83] 82  Resp:  [16-18] 16  BP: (121-159)/(67-84) 139/67  SpO2:  [96 %-100 %] 100 %  I/O last 3 completed shifts:  In: 960 [P.O.:960]  Out: 1250 [Urine:1250]  Constitutional: awake, alert, cooperative, no apparent distress.  HENT: PERRL, EOM grossly intact, pale conjunctivae, sclera anicteric, oral pharynx with moist mucous membranes.  Respiratory: non-labored respirations on room-air, CTAB, no crackles or wheezing, no cough.  Cardiovascular: RRR, no murmurs, no JVD.  GI: soft, non-distended, non-tender.  Ext: bilateral pitting LE edema up to the knees, appears improved from last exam.  Skin: left clavicular wound c/d/i with packing, open abdominal wound c/d/i with packing, right subclavian incision healed.  Neurologic: Cranial nerves II-XII are grossly intact, moving all extremities equally and independently.    Medications     - MEDICATION INSTRUCTIONS -       - MEDICATION INSTRUCTIONS -       ACE/ARB/ARNI NOT PRESCRIBED         aspirin  81 mg Oral Daily     calcium carbonate 600 mg-vitamin D 400 units  1 tablet Oral BID w/meals     ciprofloxacin  500 mg Oral Q12H MUSA     clotrimazole  1 lozenge Buccal 4x Daily     [Held by provider] fondaparinux ANTICOAGULANT  7.5 mg Subcutaneous Q24H     hydrALAZINE  75 mg Oral Q8H     insulin aspart  1-6 Units Subcutaneous Q4H     [Held by provider] insulin isophane human  35 Units Subcutaneous QAM     magnesium oxide  400 mg Oral BID     [Held by provider] mycophenolate  750 mg Oral BID     pantoprazole  40 mg Oral QAM AC     predniSONE  10 mg Oral QAM AC     predniSONE  5 mg Oral QPM     rosuvastatin  10 mg Oral Daily     senna-docusate  1 tablet Oral BID    Or     senna-docusate  2 tablet Oral BID     sodium chloride (PF)  3 mL Intracatheter Q8H     sulfamethoxazole-trimethoprim  1 tablet Oral Daily     tacrolimus  3 mg Oral QAM     tacrolimus  3.5 mg Oral QPM     tamsulosin  0.4 mg Oral Daily     valGANciclovir  450 mg Oral Daily       Data   Recent Labs   Lab  09/28/20  0513 09/27/20  1540 09/27/20  0428  09/26/20  0532  09/22/20  1256   WBC 2.0* 1.9* 1.7*   < > 1.6*   < > 3.0*   HGB 7.3* 7.7* 7.3*   < > 7.5*   < > 7.2*   MCV 99 99 100   < > 100   < > 100    194 173   < > 192   < > 187   INR 1.19*  --  1.15*  --  1.20*   < > 1.13     --  139  --  142   < > 133   POTASSIUM 5.1  --  4.8  --  5.0   < > 4.2   CHLORIDE 110*  --  110*  --  113*   < > 104   CO2 26  --  22  --  22   < > 22   BUN 32*  --  36*  --  41*   < > 43*   CR 1.43*  --  1.38*  --  1.84*   < > 1.50*   ANIONGAP 4  --  7  --  6   < > 7   BRYANNA 8.2*  --  8.5  --  8.2*   < > 8.0*   *  --  168*  --  170*   < > 187*   ALBUMIN  --   --   --   --   --   --  2.5*   PROTTOTAL  --   --   --   --   --   --  5.5*   BILITOTAL  --   --   --   --   --   --  0.3   ALKPHOS  --   --   --   --   --   --  93   ALT  --   --   --   --   --   --  26   AST  --   --   --   --   --   --  17    < > = values in this interval not displayed.       No results found for this or any previous visit (from the past 24 hour(s)).

## 2020-09-29 ENCOUNTER — APPOINTMENT (OUTPATIENT)
Dept: OCCUPATIONAL THERAPY | Facility: CLINIC | Age: 65
DRG: 857 | End: 2020-09-29
Payer: COMMERCIAL

## 2020-09-29 ENCOUNTER — APPOINTMENT (OUTPATIENT)
Dept: INTERPRETER SERVICES | Facility: CLINIC | Age: 65
End: 2020-09-29
Payer: COMMERCIAL

## 2020-09-29 LAB
ANION GAP SERPL CALCULATED.3IONS-SCNC: 6 MMOL/L (ref 3–14)
ANISOCYTOSIS BLD QL SMEAR: ABNORMAL
BACTERIA SPEC CULT: ABNORMAL
BASOPHILS # BLD AUTO: 0 10E9/L (ref 0–0.2)
BASOPHILS NFR BLD AUTO: 0 %
BUN SERPL-MCNC: 32 MG/DL (ref 7–30)
CALCIUM SERPL-MCNC: 7.9 MG/DL (ref 8.5–10.1)
CHLORIDE SERPL-SCNC: 109 MMOL/L (ref 94–109)
CO2 SERPL-SCNC: 23 MMOL/L (ref 20–32)
COPATH REPORT: NORMAL
CREAT SERPL-MCNC: 1.46 MG/DL (ref 0.66–1.25)
DIFFERENTIAL METHOD BLD: ABNORMAL
EOSINOPHIL # BLD AUTO: 0 10E9/L (ref 0–0.7)
EOSINOPHIL NFR BLD AUTO: 0 %
ERYTHROCYTE [DISTWIDTH] IN BLOOD BY AUTOMATED COUNT: 20.1 % (ref 10–15)
GFR SERPL CREATININE-BSD FRML MDRD: 49 ML/MIN/{1.73_M2}
GLUCOSE BLDC GLUCOMTR-MCNC: 208 MG/DL (ref 70–99)
GLUCOSE BLDC GLUCOMTR-MCNC: 272 MG/DL (ref 70–99)
GLUCOSE BLDC GLUCOMTR-MCNC: 274 MG/DL (ref 70–99)
GLUCOSE BLDC GLUCOMTR-MCNC: 278 MG/DL (ref 70–99)
GLUCOSE BLDC GLUCOMTR-MCNC: 282 MG/DL (ref 70–99)
GLUCOSE BLDC GLUCOMTR-MCNC: 370 MG/DL (ref 70–99)
GLUCOSE BLDC GLUCOMTR-MCNC: 374 MG/DL (ref 70–99)
GLUCOSE SERPL-MCNC: 279 MG/DL (ref 70–99)
HCT VFR BLD AUTO: 22.8 % (ref 40–53)
HGB BLD-MCNC: 7 G/DL (ref 13.3–17.7)
INR PPP: 1.17 (ref 0.86–1.14)
LYMPHOCYTES # BLD AUTO: 0.3 10E9/L (ref 0.8–5.3)
LYMPHOCYTES NFR BLD AUTO: 15.2 %
MAGNESIUM SERPL-MCNC: 1.9 MG/DL (ref 1.6–2.3)
MCH RBC QN AUTO: 30.4 PG (ref 26.5–33)
MCHC RBC AUTO-ENTMCNC: 30.7 G/DL (ref 31.5–36.5)
MCV RBC AUTO: 99 FL (ref 78–100)
MICROCYTES BLD QL SMEAR: PRESENT
MONOCYTES # BLD AUTO: 0.1 10E9/L (ref 0–1.3)
MONOCYTES NFR BLD AUTO: 5.4 %
NEUTROPHILS # BLD AUTO: 1.4 10E9/L (ref 1.6–8.3)
NEUTROPHILS NFR BLD AUTO: 79.4 %
NRBC # BLD AUTO: 0.1 10*3/UL
NRBC BLD AUTO-RTO: 6 /100
OVALOCYTES BLD QL SMEAR: ABNORMAL
PLATELET # BLD AUTO: 212 10E9/L (ref 150–450)
PLATELET # BLD EST: ABNORMAL 10*3/UL
POIKILOCYTOSIS BLD QL SMEAR: ABNORMAL
POLYCHROMASIA BLD QL SMEAR: ABNORMAL
POTASSIUM SERPL-SCNC: 5.2 MMOL/L (ref 3.4–5.3)
RBC # BLD AUTO: 2.3 10E12/L (ref 4.4–5.9)
SODIUM SERPL-SCNC: 138 MMOL/L (ref 133–144)
SPECIMEN SOURCE: ABNORMAL
TACROLIMUS BLD-MCNC: 7.6 UG/L (ref 5–15)
TME LAST DOSE: NORMAL H
WBC # BLD AUTO: 1.8 10E9/L (ref 4–11)

## 2020-09-29 PROCEDURE — 25000132 ZZH RX MED GY IP 250 OP 250 PS 637: Performed by: STUDENT IN AN ORGANIZED HEALTH CARE EDUCATION/TRAINING PROGRAM

## 2020-09-29 PROCEDURE — 36415 COLL VENOUS BLD VENIPUNCTURE: CPT | Performed by: STUDENT IN AN ORGANIZED HEALTH CARE EDUCATION/TRAINING PROGRAM

## 2020-09-29 PROCEDURE — 85027 COMPLETE CBC AUTOMATED: CPT | Performed by: STUDENT IN AN ORGANIZED HEALTH CARE EDUCATION/TRAINING PROGRAM

## 2020-09-29 PROCEDURE — 97535 SELF CARE MNGMENT TRAINING: CPT | Mod: GO

## 2020-09-29 PROCEDURE — 25000131 ZZH RX MED GY IP 250 OP 636 PS 637: Performed by: STUDENT IN AN ORGANIZED HEALTH CARE EDUCATION/TRAINING PROGRAM

## 2020-09-29 PROCEDURE — 80048 BASIC METABOLIC PNL TOTAL CA: CPT | Performed by: STUDENT IN AN ORGANIZED HEALTH CARE EDUCATION/TRAINING PROGRAM

## 2020-09-29 PROCEDURE — 99232 SBSQ HOSP IP/OBS MODERATE 35: CPT | Mod: 25 | Performed by: INTERNAL MEDICINE

## 2020-09-29 PROCEDURE — 00000146 ZZHCL STATISTIC GLUCOSE BY METER IP

## 2020-09-29 PROCEDURE — 21400000 ZZH R&B CCU UMMC

## 2020-09-29 PROCEDURE — 25000132 ZZH RX MED GY IP 250 OP 250 PS 637: Performed by: PHYSICIAN ASSISTANT

## 2020-09-29 PROCEDURE — 25000132 ZZH RX MED GY IP 250 OP 250 PS 637: Performed by: INTERNAL MEDICINE

## 2020-09-29 PROCEDURE — 85004 AUTOMATED DIFF WBC COUNT: CPT | Performed by: STUDENT IN AN ORGANIZED HEALTH CARE EDUCATION/TRAINING PROGRAM

## 2020-09-29 PROCEDURE — 83735 ASSAY OF MAGNESIUM: CPT | Performed by: STUDENT IN AN ORGANIZED HEALTH CARE EDUCATION/TRAINING PROGRAM

## 2020-09-29 PROCEDURE — 25000131 ZZH RX MED GY IP 250 OP 636 PS 637: Performed by: INTERNAL MEDICINE

## 2020-09-29 PROCEDURE — 80197 ASSAY OF TACROLIMUS: CPT | Performed by: STUDENT IN AN ORGANIZED HEALTH CARE EDUCATION/TRAINING PROGRAM

## 2020-09-29 PROCEDURE — 25000128 H RX IP 250 OP 636: Performed by: STUDENT IN AN ORGANIZED HEALTH CARE EDUCATION/TRAINING PROGRAM

## 2020-09-29 PROCEDURE — 85610 PROTHROMBIN TIME: CPT | Performed by: STUDENT IN AN ORGANIZED HEALTH CARE EDUCATION/TRAINING PROGRAM

## 2020-09-29 RX ORDER — PREDNISONE 5 MG/1
5 TABLET ORAL
Status: DISCONTINUED | OUTPATIENT
Start: 2020-09-30 | End: 2020-10-03

## 2020-09-29 RX ADMIN — HYDRALAZINE HYDROCHLORIDE 75 MG: 25 TABLET ORAL at 05:05

## 2020-09-29 RX ADMIN — CIPROFLOXACIN HYDROCHLORIDE 500 MG: 500 TABLET, FILM COATED ORAL at 19:56

## 2020-09-29 RX ADMIN — TAMSULOSIN HYDROCHLORIDE 0.4 MG: 0.4 CAPSULE ORAL at 08:28

## 2020-09-29 RX ADMIN — HYDRALAZINE HYDROCHLORIDE 75 MG: 25 TABLET ORAL at 20:00

## 2020-09-29 RX ADMIN — MAGNESIUM OXIDE 400 MG: 400 TABLET ORAL at 19:56

## 2020-09-29 RX ADMIN — CIPROFLOXACIN HYDROCHLORIDE 500 MG: 500 TABLET, FILM COATED ORAL at 08:27

## 2020-09-29 RX ADMIN — HYDRALAZINE HYDROCHLORIDE 75 MG: 25 TABLET ORAL at 11:57

## 2020-09-29 RX ADMIN — INSULIN HUMAN 35 UNITS: 100 INJECTION, SUSPENSION SUBCUTANEOUS at 08:30

## 2020-09-29 RX ADMIN — TACROLIMUS 3.5 MG: 1 CAPSULE ORAL at 17:50

## 2020-09-29 RX ADMIN — PANTOPRAZOLE SODIUM 40 MG: 40 TABLET, DELAYED RELEASE ORAL at 08:29

## 2020-09-29 RX ADMIN — CLOTRIMAZOLE 1 LOZENGE: 10 LOZENGE ORAL at 17:08

## 2020-09-29 RX ADMIN — ROSUVASTATIN CALCIUM 10 MG: 10 TABLET, FILM COATED ORAL at 08:29

## 2020-09-29 RX ADMIN — MAGNESIUM OXIDE 400 MG: 400 TABLET ORAL at 08:28

## 2020-09-29 RX ADMIN — CLOTRIMAZOLE 1 LOZENGE: 10 LOZENGE ORAL at 19:56

## 2020-09-29 RX ADMIN — CLOTRIMAZOLE 1 LOZENGE: 10 LOZENGE ORAL at 08:28

## 2020-09-29 RX ADMIN — Medication 1 TABLET: at 08:29

## 2020-09-29 RX ADMIN — CLOTRIMAZOLE 1 LOZENGE: 10 LOZENGE ORAL at 11:56

## 2020-09-29 RX ADMIN — FONDAPARINUX SODIUM 7.5 MG: 7.5 INJECTION, SOLUTION SUBCUTANEOUS at 19:57

## 2020-09-29 RX ADMIN — TACROLIMUS 3 MG: 1 CAPSULE ORAL at 08:28

## 2020-09-29 RX ADMIN — PREDNISONE 10 MG: 10 TABLET ORAL at 08:27

## 2020-09-29 RX ADMIN — Medication: at 10:46

## 2020-09-29 RX ADMIN — VALGANCICLOVIR 450 MG: 450 TABLET, FILM COATED ORAL at 08:28

## 2020-09-29 RX ADMIN — Medication: at 15:08

## 2020-09-29 RX ADMIN — SULFAMETHOXAZOLE AND TRIMETHOPRIM 1 TABLET: 400; 80 TABLET ORAL at 08:29

## 2020-09-29 RX ADMIN — Medication 1 TABLET: at 17:50

## 2020-09-29 RX ADMIN — PREDNISONE 5 MG: 5 TABLET ORAL at 19:56

## 2020-09-29 ASSESSMENT — ACTIVITIES OF DAILY LIVING (ADL)
ADLS_ACUITY_SCORE: 14

## 2020-09-29 NOTE — PLAN OF CARE
Temp: 98.9  F (37.2  C) Temp src: Oral BP: 130/68 Pulse: 82   Resp: 16 SpO2: 96 % O2 Device: None (Room air) Oxygen Delivery: 2 LPM     D: Left subclavian ICD and right abdomen surgical wounds, s/p I&D 9/23 and 9/28. Hx OHT 7/2020, CAD s/p CABG 2008, DM2, BPH, CKD    I/A: Monitored vitals and assessed pt status. A&O x4. VSS see flowsheet. SR. RA. Denies pain. Left chest wound with moderate drainage, dressing reinforced. Abdominal wound CDI. Overnight BG improving. Voiding adequate amount. Up SBA. Sleeping between cares.    P: Continue to monitor and follow POC. Notify Cards 2 with changes.

## 2020-09-29 NOTE — PLAN OF CARE
"Discharge Planner OT   Patient plan for discharge: Home   Current status: Agreeable to therapy w/max encouragement. SBA bed mobility, SBA sit<>stand w/increased effort 2/2 L shoulder pain. SBA oral cares while standing at sink, no LOB noted. Declined further therapy, stating, \"I don't want to push myself too much or else I'll start bleeding.\"   Barriers to return to prior living situation: Medical status, post-op precautions   Recommendations for discharge: anticipate Home with assist prn, resumption of cardiac rehab  Rationale for recommendations: Pt SBA w/functional mobility and completion of ADLs, anticipate discharge home with assist prn from spouse once medically stable.       Entered by: Monica Logan 09/29/2020 8:06 AM       "

## 2020-09-29 NOTE — PROGRESS NOTES
Cardiology Progress Note  Lucian Henderson Sr. MRN: [id number]  Age: 64 year old, : 1955  2020      Assessment & Plan   Lucian Henderson Sr. is a 64 year old male admitted on 2020 for left subclavian and abdominal surgical wound infections growing P. Aeruginosa s/p I&D.     Changes Today:  - 6 U insulin pre-meal  - Moderate-intensity carbohydrate diet  - HDSSI  - Prednisone 5 mg am, 5 mg pm       left subclavian surgical wound infection  Abdominal wound surgical wound infection  - Blood culture (2020): Gram positive rods on 4th day of incubation; per ID, likely a contaminant. Noted to be bleeding from left subclavian wound, per CVTS, large hematoma noted, debrided at bedside.  - wound culture:    - Abdominal wound culture  growing strep mitis and pseudomonas.    - Left chest former ICD pocket culture  growing pseudomonas.  - CRP trend: 16.0 ()->42.0 ()->16.0 ().  - continue ciprofloxacin 500 mg BID, EOT 10/7.  - CV Surgery managing              > right subclavian site appears healed, no planned intervention   > Had repeat I&D of left chest wound in OR 2020.     ICM s/p OHT  Leukopenia, moderate neutropenia  Most recent biopsy (2020) was negative.  -Immunsuppression:   >Biopsy negative for evidence of acute cellular or antibody-mediated rejection.              - Prednisone 5 mg am, 5 mg pm              - Tacrolimus level (2020,  7.6 micrograms/l)    > trend tacro, goal 8-10 in setting of infection.     > Continue Tacrolimus 3 mg am, 3.5 mg pm; next level 10/01/2020.              - Holding Mycophenolate 750 mg BID in setting of infection and leukopenia/neutropenia  - Prophylaxis             - Valgancyclovir 450 mg every 48 hours             - Bactrim (400-80 mg) 1 daily              - Clotrimazole 10 mg lozenge  -Hydralazine 75mg Q8H    DAYA on CKD, non-oliguric  Cr today 1.46, baseline unclear. Is making  "urine. Low suspicion for obstruction. I/O and BUN/Cr ratio (~22) suggestive of pre-renal dehydration.   - encourage oral intake.      Hypomagnesemia, likely 2/2 tacrolimus  Last Mg (09/29/2020): 1.9 mg/dl.    - Keep Mg > 2.0                          >Replacement protocol              - continue magnesium oxide 400 mg bid     Chronic normocytic anemia  possibly anemia of chronic disease. Iron studies: elevated ferritin (447 ng/ml, 09/22/2020), iron and iron binding cap are wnl (09/22/2020). Hold-off on transfusions for now given risk of rejection.               - continue to monitor.    - Peripheral smear: \"Marked hypochromic, normocytic anemia; no increase in erythrocyte regeneration; rare bite cells. Marked leukopenia; neutropenia; lymphocytopenia.\"    > consider LDH, haptoglobin, bilirubin.     T2DM  - continue pta Humulin 35 units daily.  - 6 U insulin pre-meal  - Moderate-intensity carbohydrate diet  - HDSSI  - Dietary education.     Constipation history  - Miralax 17 g daily  - Senna-docusate 8.6-50 mg bid     BPH  - Continue PTA Tamsulosin 0.4 mg daily     h/o DVT  h/o HIT  - PTA Fondaparinux 7.5 mg daily.      Diet: Carb-restricted diet.   DVT Prophylaxis:Fondaparinux 7.5 mg daily.  GI prophylaxis: Pantoprazole 40 mg daily  Osteoporosis: Calcium carbonate -Vitamin D 600-400mg daily  CAV: Rosuvastatin 10 mg daily  Calderon Catheter: not present  Code Status: Full code  Fluids: None  Lines: PIV    The patient's care was discussed with the Attending Physician, Dr. Holder.    Erickson Kelly MD  Internal Medicine, PGY-1  [facility], [place]  Pager: [phone]    Interval History   NAEO.  No significant bleeding from chest wound overnight.  No complaints this am.    Physical Exam   Temp: 99  F (37.2  C) Temp src: Oral BP: (!) 141/83 Pulse: 83   Resp: 16 SpO2: 97 % O2 Device: None (Room air)    Vitals:    09/25/20 0613 09/26/20 0355 09/27/20 0330   Weight: 83.1 kg (183 lb 4.8 oz) 84.6 kg (186 " lb 8 oz) 84.2 kg (185 lb 11.2 oz)     Vital Signs with Ranges  Temp:  [97.6  F (36.4  C)-99  F (37.2  C)] 99  F (37.2  C)  Pulse:  [70-94] 83  Resp:  [16-18] 16  BP: ()/(44-96) 141/83  SpO2:  [92 %-100 %] 97 %  I/O last 3 completed shifts:  In: 500 [P.O.:500]  Out: 1000 [Urine:1000]  Constitutional: awake, alert, cooperative, no apparent distress.  HENT: PERRL, EOM grossly intact, pale conjunctivae, sclera anicteric, oral pharynx with moist mucous membranes.  Respiratory: non-labored respirations on room-air, CTAB, no crackles or wheezing, no cough.  Cardiovascular: RRR, no murmurs, no JVD.  GI: soft, non-distended, non-tender.  Ext: bilateral pitting LE edema up to the knees, appears improved from last exam.  Skin: left clavicular wound c/d/i with packing, open abdominal wound c/d/i with packing, right subclavian incision healed.  Neurologic: Cranial nerves II-XII are grossly intact, moving all extremities equally and independently.    Medications     - MEDICATION INSTRUCTIONS -       - MEDICATION INSTRUCTIONS -       ACE/ARB/ARNI NOT PRESCRIBED         aspirin  81 mg Oral Daily     calcium carbonate 600 mg-vitamin D 400 units  1 tablet Oral BID w/meals     ciprofloxacin  500 mg Oral Q12H MUSA     clotrimazole  1 lozenge Buccal 4x Daily     fondaparinux ANTICOAGULANT  7.5 mg Subcutaneous Q24H     hydrALAZINE  75 mg Oral Q8H     insulin aspart  1-10 Units Subcutaneous TID AC     insulin aspart  1-7 Units Subcutaneous At Bedtime     insulin isophane human  35 Units Subcutaneous QAM     magnesium oxide  400 mg Oral BID     [Held by provider] mycophenolate  750 mg Oral BID     pantoprazole  40 mg Oral QAM AC     predniSONE  10 mg Oral QAM AC     predniSONE  5 mg Oral QPM     rosuvastatin  10 mg Oral Daily     senna-docusate  1 tablet Oral BID    Or     senna-docusate  2 tablet Oral BID     sodium chloride (PF)  3 mL Intracatheter Q8H     sodium hypochlorite   Topical TID     sulfamethoxazole-trimethoprim  1 tablet  Oral Daily     tacrolimus  3 mg Oral QAM     tacrolimus  3.5 mg Oral QPM     tamsulosin  0.4 mg Oral Daily     valGANciclovir  450 mg Oral Daily       Data   Recent Labs   Lab 09/29/20  0523 09/28/20  1654 09/28/20  0513  09/27/20  0428  09/22/20  1256   WBC 1.8* 2.1* 2.0*   < > 1.7*   < > 3.0*   HGB 7.0* 8.1* 7.3*   < > 7.3*   < > 7.2*   MCV 99 100 99   < > 100   < > 100    222 189   < > 173   < > 187   INR 1.17*  --  1.19*  --  1.15*   < > 1.13     --  139  --  139   < > 133   POTASSIUM 5.2  --  5.1  --  4.8   < > 4.2   CHLORIDE 109  --  110*  --  110*   < > 104   CO2 23  --  26  --  22   < > 22   BUN 32*  --  32*  --  36*   < > 43*   CR 1.46*  --  1.43*  --  1.38*   < > 1.50*   ANIONGAP 6  --  4  --  7   < > 7   BRYANNA 7.9*  --  8.2*  --  8.5   < > 8.0*   *  --  161*  --  168*   < > 187*   ALBUMIN  --   --   --   --   --   --  2.5*   PROTTOTAL  --   --   --   --   --   --  5.5*   BILITOTAL  --   --   --   --   --   --  0.3   ALKPHOS  --   --   --   --   --   --  93   ALT  --   --   --   --   --   --  26   AST  --   --   --   --   --   --  17    < > = values in this interval not displayed.       Recent Results (from the past 24 hour(s))   Cardiac Catheterization    Narrative      Successful endomyocardial biopsy. Results pending.    Right sided filling pressures are mildly elevated.    Mild elevated Pulmonary Hypertension.    Left sided filling pressures are mildly elevated.    Normal cardiac output level.    Hemodynamic data has been modified in Epic per physician review.

## 2020-09-29 NOTE — OP NOTE
Procedure Date: 2020      PREOPERATIVE DIAGNOSES:     1.  Left infraclavicular AICD pocket infection.   2.  Chest wound infection.   3.  Status post orthotopic heart transplantation.      POSTOPERATIVE DIAGNOSES:     1.  Left infraclavicular AICD pocket infection.   2.  Chest wound infection.   3.  Status post orthotopic heart transplantation.      PROCEDURES:   1.  Incision and debridement of left infraclavicular wound.   2.  Incision and debridement of midline chest tube wound.      SURGEON:  Miles Tanner MD      DESCRIPTION OF OPERATION:  The patient was brought to operating room in stable condition.  Under light sedation, the patient's chest and abdomen were prepped and draped in the usual manner.  A scalpel blade was used to incise the left infraclavicular wound.  Necrotic tissue was sharply debrided and excised with the Metzenbaum scissors.  All necrotic tissue was carefully removed.  Hemostasis obtained.  The wound was irrigated.  Next, a scalpel blade was used to excise the necrotic tissue around the chest tube wound.  Careful hemostasis obtained.  Necrotic tissue was debrided.  Both wounds were packed.  The patient was transferred to ICU in the recovery room in stable critical condition.         MILES TANNER MD             D: 2020   T: 2020   MT: NADER      Name:     BUCK CABAN   MRN:      [id number]        Account:        [id number]   :      1955           Procedure Date: 2020      Document: [id number]

## 2020-09-29 NOTE — PLAN OF CARE
[id number]  Patient is a 64 year old male admitted f9/22 for I&D of ICD pocket and old chest tube site with a PMH of OHT (7/2020), .    Neuro: Aox4, primary language Syriac. Communicates well in english.  Cardiac: denies cardiac symptoms  Telemetry: SR  Respiratory: RA, denies SOB and cough  GI/: voiding without difficulty, reeducated to measure urine. LBM 9/29-loose and incontinent this AM.  Endocrine: BG checks-hyperglycemic today, team notified. See eMAR for adjustments.  Diet/Appetite: good appetite-carb controlled diet.  Skin: dressing changes, see orders for details. No other skin concerns.  LDA: PIVx1 SL  Activity: up SBA-steady on feet  Pain: denies pain-declines pain meds with dressing changes. Grimaces at times with dressing change.  Plan: control infection and monitor dressings. Contact cards 2 care team with questions or concerns.

## 2020-09-29 NOTE — PROVIDER NOTIFICATION
Notified cards2 care team of BG over 374dL/mg. To recheck 1 hour after eating meal. Recheck 370dL/mg.   Discussed plan with cards 2 care team. See orders.

## 2020-09-29 NOTE — PROGRESS NOTES
CLINICAL NUTRITION SERVICES    Reviewed nutrition risk factors due to LOS. Pt is tolerating diet and eating adequately per nursing documentation (good appetite, consuming 100% of meals per flowsheets). Per discussion with pt, reports good/adequate oral intake. Reviewed wt hx: 93.2 kg (8/26/19), 93.4 kg (12/10/19), 79.8 kg (7/2/20), 84.2 kg (9/27/20) - Wt changes with change in fluid status, diuresis, and OHT on 7/3/20. Pt is on a High Consistent Carbohydrate Diet order, provided Carbohydrate room service menu to help with the ordering process.     Follow Up / Monitoring:   Will continue to follow pt via rounds.    Christianne Rendon, MS, RD, LD, [facility]   6C Pgr: [phone]

## 2020-09-29 NOTE — PLAN OF CARE
D: Admit 9/22 for left subclavian and abdominal surgical wound infections growing P. Aeruginosa s/p I&D.     I/A: A&Ox4. VSS. NPO since midnight for RHC/biopsy and I&D L subclavian wound. Medications adjusted prior to procedure. L subclavian and L mid abdominal dressings completed prior to leaving for procedures, per orders. RHC completed between 1197-7830, no meds received during procedure, access site RIJ. C/D/I, CMS intact. Left for 3c for I&D at 1100, returned approximately 1330. L subclavian dressing with guaze and abd, dressing marked upon return with dried drainage noted on dressing. Capnography on, IPIs 10, CO2 35-41. VSS. Tylenol given x1 for pain. Voiding, per report. L PIV SL.     P: Update CARDS 2 and CVTS if questions/concerns.     Anna Manning RN  Time of care [id number]

## 2020-09-29 NOTE — PROGRESS NOTES
CV Surgery    Dressing change of left ICD pocket packing. Packing removed, wound bed clean. There was an area of bleeding that was cauterized with 3 sticks of silver nitrate. Repacked with small kerlex that was moistened with Dakin's solution and covered with an ABD.     Also removed packing of abdominal wound. Fibrinous exudate on the wound bed was debrided with gauze. This was repacked with nugauze moistened with Dakin's solution.     Continue dressing changes in this fashion 3 times daily, to be performed by nursing.   Would recommend IV antibiotics per Dr. Huertas, communicated with Cards 2  Other cares per cards 2.     Donald Rand PA-C  Cardiothoracic Surgery  p: [phone]

## 2020-09-29 NOTE — PROGRESS NOTES
D: Admit 9/22 for left subclavian and abdominal surgical wound infections growing P. Aeruginosa s/p I&D.      I/A: A&Ox4. VSS.  following procedure at 1530, CARDS 2 notified for one time insulin dose of 10 units. Recheck at 2200 noted to be 394. Sliding scale coverage given (7 units) and CARDS 2 provider notified. Long acting insulin was held today instead of halved for NPO status.  L subclavian dressing with guaze and abd, previously marked, but moderate drainage noted on current dressing. Dressing change due on 9/29 am by CVTS. CVTS notified of drainage and requested no pressure dressing but to reinforce with guaze as needed. Capnography on, IPIs 10, CO2 35-41. VSS.  Voiding, per report. L PIV SL. Good appetite.      P:  Continue to monitor BG and dressing site. Update CARDS 2 and CVTS if questions/concerns.      Anna Manning RN  Time of care [id number]

## 2020-09-29 NOTE — OP NOTE
Procedure Date: 2020      PREOPERATIVE DIAGNOSIS:  Left infraclavicular wound infection.      POSTOPERATIVE DIAGNOSIS:  Left infraclavicular wound infection.      PROCEDURE:  Excisional debridement of the left infraclavicular wound.      SURGEON:  Miles Tanner MD      DESCRIPTION OF PROCEDURE:  Suboptimal patient was brought to operating room in stable condition, administered light sedation.  The patient's chest and abdomen were prepped and draped in the usual sterile manner.  A scalpel blade was used to excise the necrotic subcutaneous tissue and fascia at the base of the wound.  All edges were excised to allow for nice bleeding wound.  Careful hemostasis was obtained.  Wound was packed.  The patient transferred to ICU in stable critical condition.         MILES TANNER MD             D: 2020   T: 2020   MT: FRANK      Name:     BUCK CABAN   MRN:      [id number]        Account:        [id number]   :      1955           Procedure Date: 2020      Document: [id number]

## 2020-09-30 LAB
ANION GAP SERPL CALCULATED.3IONS-SCNC: 5 MMOL/L (ref 3–14)
ANISOCYTOSIS BLD QL SMEAR: ABNORMAL
BACTERIA SPEC CULT: NORMAL
BACTERIA SPEC CULT: NORMAL
BASOPHILS # BLD AUTO: 0 10E9/L (ref 0–0.2)
BASOPHILS NFR BLD AUTO: 0 %
BUN SERPL-MCNC: 30 MG/DL (ref 7–30)
CALCIUM SERPL-MCNC: 8.4 MG/DL (ref 8.5–10.1)
CHLORIDE SERPL-SCNC: 110 MMOL/L (ref 94–109)
CO2 SERPL-SCNC: 25 MMOL/L (ref 20–32)
CREAT SERPL-MCNC: 1.47 MG/DL (ref 0.66–1.25)
DACRYOCYTES BLD QL SMEAR: SLIGHT
DIFFERENTIAL METHOD BLD: ABNORMAL
EOSINOPHIL # BLD AUTO: 0 10E9/L (ref 0–0.7)
EOSINOPHIL NFR BLD AUTO: 0 %
ERYTHROCYTE [DISTWIDTH] IN BLOOD BY AUTOMATED COUNT: 20.3 % (ref 10–15)
GFR SERPL CREATININE-BSD FRML MDRD: 49 ML/MIN/{1.73_M2}
GLUCOSE BLDC GLUCOMTR-MCNC: 162 MG/DL (ref 70–99)
GLUCOSE BLDC GLUCOMTR-MCNC: 163 MG/DL (ref 70–99)
GLUCOSE BLDC GLUCOMTR-MCNC: 186 MG/DL (ref 70–99)
GLUCOSE BLDC GLUCOMTR-MCNC: 228 MG/DL (ref 70–99)
GLUCOSE BLDC GLUCOMTR-MCNC: 249 MG/DL (ref 70–99)
GLUCOSE SERPL-MCNC: 162 MG/DL (ref 70–99)
HCT VFR BLD AUTO: 22.7 % (ref 40–53)
HGB BLD-MCNC: 7 G/DL (ref 13.3–17.7)
IMMUKNOW IMMUNE CELL FUNCTION: 76 NG/ML
INR PPP: 1.16 (ref 0.86–1.14)
LYMPHOCYTES # BLD AUTO: 0.2 10E9/L (ref 0.8–5.3)
LYMPHOCYTES NFR BLD AUTO: 10.5 %
Lab: NORMAL
Lab: NORMAL
MACROCYTES BLD QL SMEAR: PRESENT
MAGNESIUM SERPL-MCNC: 1.8 MG/DL (ref 1.6–2.3)
MCH RBC QN AUTO: 30.8 PG (ref 26.5–33)
MCHC RBC AUTO-ENTMCNC: 30.8 G/DL (ref 31.5–36.5)
MCV RBC AUTO: 100 FL (ref 78–100)
MONOCYTES # BLD AUTO: 0.1 10E9/L (ref 0–1.3)
MONOCYTES NFR BLD AUTO: 3.5 %
MYELOCYTES # BLD: 0 10E9/L
MYELOCYTES NFR BLD MANUAL: 0.9 %
NEUTROPHILS # BLD AUTO: 1.6 10E9/L (ref 1.6–8.3)
NEUTROPHILS NFR BLD AUTO: 85.1 %
NRBC # BLD AUTO: 0 10*3/UL
NRBC BLD AUTO-RTO: 2 /100
OVALOCYTES BLD QL SMEAR: SLIGHT
PLATELET # BLD AUTO: 182 10E9/L (ref 150–450)
PLATELET # BLD EST: ABNORMAL 10*3/UL
POIKILOCYTOSIS BLD QL SMEAR: ABNORMAL
POTASSIUM SERPL-SCNC: 5.1 MMOL/L (ref 3.4–5.3)
RBC # BLD AUTO: 2.27 10E12/L (ref 4.4–5.9)
RBC AGGLUT BLD QL: PRESENT
SODIUM SERPL-SCNC: 140 MMOL/L (ref 133–144)
SPECIMEN SOURCE: NORMAL
SPECIMEN SOURCE: NORMAL
TACROLIMUS BLD-MCNC: 8.7 UG/L (ref 5–15)
TME LAST DOSE: NORMAL H
WBC # BLD AUTO: 1.9 10E9/L (ref 4–11)

## 2020-09-30 PROCEDURE — 83735 ASSAY OF MAGNESIUM: CPT | Performed by: STUDENT IN AN ORGANIZED HEALTH CARE EDUCATION/TRAINING PROGRAM

## 2020-09-30 PROCEDURE — 25000131 ZZH RX MED GY IP 250 OP 636 PS 637: Performed by: STUDENT IN AN ORGANIZED HEALTH CARE EDUCATION/TRAINING PROGRAM

## 2020-09-30 PROCEDURE — 25000132 ZZH RX MED GY IP 250 OP 250 PS 637: Performed by: STUDENT IN AN ORGANIZED HEALTH CARE EDUCATION/TRAINING PROGRAM

## 2020-09-30 PROCEDURE — 85025 COMPLETE CBC W/AUTO DIFF WBC: CPT | Performed by: STUDENT IN AN ORGANIZED HEALTH CARE EDUCATION/TRAINING PROGRAM

## 2020-09-30 PROCEDURE — 21400000 ZZH R&B CCU UMMC

## 2020-09-30 PROCEDURE — 80048 BASIC METABOLIC PNL TOTAL CA: CPT | Performed by: STUDENT IN AN ORGANIZED HEALTH CARE EDUCATION/TRAINING PROGRAM

## 2020-09-30 PROCEDURE — 36415 COLL VENOUS BLD VENIPUNCTURE: CPT | Performed by: STUDENT IN AN ORGANIZED HEALTH CARE EDUCATION/TRAINING PROGRAM

## 2020-09-30 PROCEDURE — 00000146 ZZHCL STATISTIC GLUCOSE BY METER IP

## 2020-09-30 PROCEDURE — 25000132 ZZH RX MED GY IP 250 OP 250 PS 637: Performed by: INTERNAL MEDICINE

## 2020-09-30 PROCEDURE — 80197 ASSAY OF TACROLIMUS: CPT | Performed by: STUDENT IN AN ORGANIZED HEALTH CARE EDUCATION/TRAINING PROGRAM

## 2020-09-30 PROCEDURE — 25000131 ZZH RX MED GY IP 250 OP 636 PS 637: Performed by: INTERNAL MEDICINE

## 2020-09-30 PROCEDURE — 85610 PROTHROMBIN TIME: CPT | Performed by: STUDENT IN AN ORGANIZED HEALTH CARE EDUCATION/TRAINING PROGRAM

## 2020-09-30 PROCEDURE — 99232 SBSQ HOSP IP/OBS MODERATE 35: CPT | Performed by: NURSE PRACTITIONER

## 2020-09-30 PROCEDURE — 25000128 H RX IP 250 OP 636: Performed by: INTERNAL MEDICINE

## 2020-09-30 PROCEDURE — 25000128 H RX IP 250 OP 636: Performed by: STUDENT IN AN ORGANIZED HEALTH CARE EDUCATION/TRAINING PROGRAM

## 2020-09-30 RX ADMIN — VALGANCICLOVIR 450 MG: 450 TABLET, FILM COATED ORAL at 08:27

## 2020-09-30 RX ADMIN — Medication: at 20:29

## 2020-09-30 RX ADMIN — MAGNESIUM SULFATE 2 G: 2 INJECTION INTRAVENOUS at 13:46

## 2020-09-30 RX ADMIN — Medication 1 TABLET: at 17:35

## 2020-09-30 RX ADMIN — ROSUVASTATIN CALCIUM 10 MG: 10 TABLET, FILM COATED ORAL at 08:25

## 2020-09-30 RX ADMIN — TAMSULOSIN HYDROCHLORIDE 0.4 MG: 0.4 CAPSULE ORAL at 08:24

## 2020-09-30 RX ADMIN — SULFAMETHOXAZOLE AND TRIMETHOPRIM 1 TABLET: 400; 80 TABLET ORAL at 08:26

## 2020-09-30 RX ADMIN — CLOTRIMAZOLE 1 LOZENGE: 10 LOZENGE ORAL at 11:47

## 2020-09-30 RX ADMIN — MAGNESIUM OXIDE 400 MG: 400 TABLET ORAL at 20:27

## 2020-09-30 RX ADMIN — INSULIN HUMAN 35 UNITS: 100 INJECTION, SUSPENSION SUBCUTANEOUS at 08:26

## 2020-09-30 RX ADMIN — HYDRALAZINE HYDROCHLORIDE 75 MG: 25 TABLET ORAL at 20:27

## 2020-09-30 RX ADMIN — MAGNESIUM OXIDE 400 MG: 400 TABLET ORAL at 08:25

## 2020-09-30 RX ADMIN — PANTOPRAZOLE SODIUM 40 MG: 40 TABLET, DELAYED RELEASE ORAL at 08:24

## 2020-09-30 RX ADMIN — CIPROFLOXACIN HYDROCHLORIDE 500 MG: 500 TABLET, FILM COATED ORAL at 20:28

## 2020-09-30 RX ADMIN — FONDAPARINUX SODIUM 7.5 MG: 7.5 INJECTION, SOLUTION SUBCUTANEOUS at 18:54

## 2020-09-30 RX ADMIN — Medication: at 14:13

## 2020-09-30 RX ADMIN — CLOTRIMAZOLE 1 LOZENGE: 10 LOZENGE ORAL at 16:02

## 2020-09-30 RX ADMIN — HYDRALAZINE HYDROCHLORIDE 75 MG: 25 TABLET ORAL at 04:00

## 2020-09-30 RX ADMIN — HYDROMORPHONE HYDROCHLORIDE 0.3 MG: 1 INJECTION, SOLUTION INTRAMUSCULAR; INTRAVENOUS; SUBCUTANEOUS at 09:55

## 2020-09-30 RX ADMIN — TACROLIMUS 3.5 MG: 1 CAPSULE ORAL at 17:35

## 2020-09-30 RX ADMIN — CLOTRIMAZOLE 1 LOZENGE: 10 LOZENGE ORAL at 08:24

## 2020-09-30 RX ADMIN — PREDNISONE 5 MG: 5 TABLET ORAL at 20:27

## 2020-09-30 RX ADMIN — Medication 1 TABLET: at 08:26

## 2020-09-30 RX ADMIN — CLOTRIMAZOLE 1 LOZENGE: 10 LOZENGE ORAL at 20:31

## 2020-09-30 RX ADMIN — HYDRALAZINE HYDROCHLORIDE 75 MG: 25 TABLET ORAL at 11:47

## 2020-09-30 RX ADMIN — DOCUSATE SODIUM 50 MG AND SENNOSIDES 8.6 MG 2 TABLET: 8.6; 5 TABLET, FILM COATED ORAL at 08:25

## 2020-09-30 RX ADMIN — Medication: at 09:59

## 2020-09-30 RX ADMIN — TACROLIMUS 3 MG: 1 CAPSULE ORAL at 08:26

## 2020-09-30 RX ADMIN — PREDNISONE 5 MG: 5 TABLET ORAL at 08:24

## 2020-09-30 RX ADMIN — CIPROFLOXACIN HYDROCHLORIDE 500 MG: 500 TABLET, FILM COATED ORAL at 08:24

## 2020-09-30 ASSESSMENT — ACTIVITIES OF DAILY LIVING (ADL)
ADLS_ACUITY_SCORE: 14

## 2020-09-30 NOTE — PROGRESS NOTES
[facility]    Transplant Infectious Diseases Inpatient Progress Note      Lucian Henderson . MRN# [id number]   YOB: 1955 Age: 64 year old   Date of Admission and time: 9/22/2020 12:31 PM             Recommendations: 1.   Continue ciprofloxacin 500mg PO q12hrs for 2 weeks from day of debridement (9/23/2020), last dose to be given PM of 10/7/2020  2. Monitor for diarrhea        Summary of Presentation:   Transplants:  7/19/2020 (Heart), Postoperative day:  73     This patient is a 64 year old male with ICMP s/p OHT with basiliximab induction and TAC/MMF/prednisone maintenance.   Course complicated by DGF, HIT, RUE DVT.   Presented from cardiology clinic with wound infection.         Active Problems and Infectious Diseases Issues:   1. Cutibacterium species in one set out of two from blood cx 9/22/2020- contaminant  Cutibacterium growing in one set out of two on day #4 is a contaminant.   This will not contaminate the graft in the right subclavian area.     2. Surgical wound infection involving the left subclavian wound (prior ICD site) and the abdominal wound (prior drain site) with P aeruginosa.  The Coag Neg Staph is of no clinical value and does not need to be covered.   The S mitis on drainage only, less likely to be significant as tissue cultures with Pseudomonas only. The chimney graft in the right subclavian area does not seem to be involved and blood cultures remain negative.    Underwent surgical debridement 9/23/20. Cultures with Pseudomonas aeruginosa (pansusceptible). Given clinical stability and improvement of wound would with known susceptibilities there is no need for double coverage of Pseudomonas. Continue ciprofloxacin, which has excellent oral bioavailability for skin and soft tissue infections.       3. EBV viremia  No signs or symptoms for PTLD.   Monitor EBV by PCR per your protocol.         Old Problems and Infectious Diseases Issues:   1.  Donor with S salivarius positive blood cx; the patient received the usual perioperative ABx then ceftriaxone for total ABx of 7 days.   2. Donor sputum with P fluorescens that failed to grow and Enterobacter spp. The recipient was not treated for these organisms.      Other Infectious Disease issues include:  - QTc 420 as of 9/22/202.   - PCP prophylaxis: Bactrim   - Serostatus: CMV D+/R+, EBV D+/R+, HSV1+/2-, VZV +  On VGCV for universal ppx.   - Immunization status: Too soon to be discussed.   - Gamma globulin status: not recently checked        Attestation:  I interviewed the patient and obtained history from the patient and by reviewing the patient's chart including outside records, microbiological data, and radiological data. All data are summarized in this note.    Meme Butterfield PA-C  Infectious Disease  Pager #[phone]    09/30/2020           Interim History of Present Illness:   Feeling well this AM. No further bleeding. Chest dressing changed shortly before visit and abdominal wound is stable and improving overall with no drainage or pain. 2 loose stools this AM. No nausea, vomiting, fever, rigors, chills, sweats,          History of Present Illness:   Transplants:  7/19/2020 (Heart), Postoperative day:  73     This patient is a 64 year old male with ICMP s/p OHT with basiliximab induction and TAC/MMF/prednisone maintenance. He had ICD removed from the left subclavian area and right subclavian IABP placed then removed with chimney graft in place.   Course complicated by DGF, HIT, RUE DVT.  The patient presented to the transplant clinic 9/22/20 and was found to have erythema, swelling, tenderness and warmth of the left subclavian and midline abdominal wounds. He was sent to [facility] where blood cx were obtained, wound swab of the left subclavian wound were sent. The patient was then started on zosyn and vancomycin.     ID consulted.     The patient stated that 5 days before admission, he started to develop pain in  the involved areas for which he took tylenol. He denied fever or chills. He denied seeing drainage. Denied trauma to the areas.     Exposure History  Was born in [place], stated that he lived for [place] in the [place], lived in [place] for 50-60 years.   He lives in [place], [place]. Lives with wife and one dog.   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   He stated he was tested for TB in the remote past and was negative.   He denied exposure to TB.   No institutionalization.   He travels to [place] frequently. His last travel outside of [place] was to [place] 2 years ago.   No significant outdoors activities.                 Review of Systems:      As mentioned in the HPI otherwise negative by reviewing constitutional symptoms, central and peripheral neurological systems, respiratory system, cardiac system, GI system,  system, musculoskeletal, skin, allergy, and lymphatics.                Immunizations:     Immunization History   Administered Date(s) Administered     Influenza (IIV3) PF 10/05/2011, 10/15/2012     Influenza Vaccine IM > 6 months Valent IIV4 10/27/2015, 09/14/2016, 10/05/2017, 10/03/2018     Pneumococcal 23 valent 08/04/2009, 04/03/2015     TDAP Vaccine (Adacel) 08/04/2009            Allergies:     Allergies   Allergen Reactions     Heparin Heparin Induced Thrombocytopenia     HIT screen sent 7/18/2020. CORRINE confirmed positive             Medications:   Medications that Require Transfusion:     - MEDICATION INSTRUCTIONS -       - MEDICATION INSTRUCTIONS -       ACE/ARB/ARNI NOT PRESCRIBED       Scheduled Medications:     aspirin  81 mg Oral Daily     calcium carbonate 600 mg-vitamin D 400 units  1 tablet Oral BID w/meals     ciprofloxacin  500 mg Oral Q12H [facility]     clotrimazole  1 lozenge Buccal 4x Daily     fondaparinux ANTICOAGULANT  7.5 mg Subcutaneous Q24H     hydrALAZINE  75 mg Oral Q8H     insulin aspart   Subcutaneous TID w/meals     insulin aspart  1-10 Units Subcutaneous  TID AC     insulin aspart  1-7 Units Subcutaneous At Bedtime     insulin isophane human  35 Units Subcutaneous QAM     magnesium oxide  400 mg Oral BID     [Held by provider] mycophenolate  750 mg Oral BID     pantoprazole  40 mg Oral QAM AC     predniSONE  5 mg Oral QAM AC     predniSONE  5 mg Oral QPM     rosuvastatin  10 mg Oral Daily     senna-docusate  1 tablet Oral BID    Or     senna-docusate  2 tablet Oral BID     sodium chloride (PF)  3 mL Intracatheter Q8H     sodium hypochlorite   Topical TID     sulfamethoxazole-trimethoprim  1 tablet Oral Daily     tacrolimus  3 mg Oral QAM     tacrolimus  3.5 mg Oral QPM     tamsulosin  0.4 mg Oral Daily     valGANciclovir  450 mg Oral Daily               Physical Exam:   Temp: 98.6  F (37  C) Temp src: Oral BP: 121/69 Pulse: 92   Resp: 16 SpO2: 96 % O2 Device: None (Room air)      Wt Readings from Last 4 Encounters:   09/30/20 81.6 kg (179 lb 12.8 oz)   08/28/20 76.9 kg (169 lb 8.5 oz)   08/17/20 79.8 kg (176 lb)   08/10/20 79.8 kg (176 lb)     General: awake, alert, and in NAD. Sitting on edge of bed, wife visiting.   HEENT: atraumatic, normocephalic. Anicteric sclerae with non-injected conjunctiva. Moist mucous membranes.  Respriatory: CTAB without wheeze or rales  Cardiovascular: RRR, +S1/S2, no murmur.  Abdominal: soft, non-distended, non-tender. + bowel sounds.  Skin: L former pacemaker site with dressing recently changed by surgical team in place, no drainage. Abdominal wound packed, edges are non-erythematous, there is no drainage, non-tender to palpation.         Data:   No results found for: ACD4    Inflammatory Markers    Recent Labs   Lab Test 09/25/20  0549 09/23/20  0710 09/22/20  1256 08/25/20  0445 07/08/19  1021   CRP 16.0* 42.0* 16.0* <2.9 9.9       Immune Globulin Studies     Recent Labs   Lab Test 04/09/15  0721   IGG 1,310   IGM 38*          Metabolic Studies       Recent Labs   Lab Test 09/30/20  0540 09/29/20  0523 09/28/20  0513  09/27/20  0428 09/26/20  0532 09/25/20  0549  09/23/20  1147  09/21/20  1017 09/18/20  0954  08/17/20  0847  07/22/20  1608  07/03/20  1559    138 139 139 142 139   < >  --    < > 133 134   < > 133   < > 140   < > 133   POTASSIUM 5.1 5.2 5.1 4.8 5.0 4.9   < >  --    < > 4.7 4.5   < > 4.8   < > 4.9   < > 4.4   CHLORIDE 110* 109 110* 110* 113* 111*   < >  --    < > 104 107   < > 102   < > 110*   < > 102   CO2 25 23 26 22 22 23   < >  --    < > 20 17*   < > 26   < > 25   < > 25   ANIONGAP 5 6 4 7 6 5   < >  --    < > 9 10   < > 6   < > 5   < > 6   BUN 30 32* 32* 36* 41* 36*   < >  --    < > 45* 40*   < > 67*   < > 40*   < > 33*   CR 1.47* 1.46* 1.43* 1.38* 1.84* 1.74*   < >  --    < > 1.57* 1.17   < > 1.67*   < > 1.47*   < > 1.65*   GFRESTIMATED 49* 49* 50* 53* 37* 40*   < >  --    < > 45* 64   < > 42*   < > 49*   < > 42*   * 279* 161* 168* 170* 97   < >  --    < > 212* 252*   < > 391*   < > 134*   < > 229*   A1C  --   --   --   --   --   --   --   --   --   --   --   --   --   --   --   --  8.2*   BRYANNA 8.4* 7.9* 8.2* 8.5 8.2* 7.8*   < >  --    < > 7.9* 8.0*   < > 8.3*   < > 8.1*   < > 8.7   PHOS  --   --   --   --   --   --   --   --   --  1.8* 1.5*   < > 3.5   < > 2.8   < >  --    MAG 1.8 1.9 1.7 2.0 2.0 2.1   < >  --    < > 1.1* 1.5*   < > 1.9   < > 2.3   < > 2.2   LACT  --   --   --   --   --   --   --  1.3  --   --   --   --   --   --  1.0   < >  --    CKT  --   --   --   --   --   --   --   --   --   --   --   --  76  --   --   --   --     < > = values in this interval not displayed.       Hepatic Studies    Recent Labs   Lab Test 09/22/20  1256 09/14/20  1153 08/31/20  0856 08/25/20  1705 08/24/20  2026 08/17/20  0847 08/13/20  0713  07/08/19  1021   BILITOTAL 0.3 0.3 0.4  --  0.5 0.5 0.6   < > 0.6   ALKPHOS 93 100 117  --  124 141 91   < > 126   ALBUMIN 2.5* 2.7* 2.7*  --  2.7* 2.7* 2.6*   < > 3.4   AST 17 19 13  --  12 13 16   < > 14   ALT 26 26 31  --  28 29 27   < > 20   LDH  --   --   --  433*   --   --   --   --  174    < > = values in this interval not displayed.       Pancreatitis testing    Recent Labs   Lab Test 08/17/20  0847 07/19/20  1613 07/08/19  1021 08/16/18  0834 07/05/17  0732 07/06/16  0717 06/27/15  0755   AMYLASE  --  36  --   --   --   --   --    TRIG 82  --  181* 297* 226* 225* 99       Hematology Studies      Recent Labs   Lab Test 09/30/20  0540 09/29/20  0523 09/28/20  1654 09/28/20  0513 09/27/20  1540 09/27/20  0428 09/26/20  1837 09/26/20  0532   WBC 1.9* 1.8* 2.1* 2.0* 1.9* 1.7* 1.7* 1.6*   ANEU 1.6 1.4*  --  1.5*  --  1.1* 1.4* 1.3*   ALYM 0.2* 0.3*  --  0.3*  --  0.4* 0.2* 0.2*   ELAN 0.1 0.1  --  0.1  --  0.1 0.0 0.1   AEOS 0.0 0.0  --  0.1  --  0.0 0.0 0.0   HGB 7.0* 7.0* 8.1* 7.3* 7.7* 7.3* 7.7* 7.5*   HCT 22.7* 22.8* 25.6* 24.2* 25.0* 23.6* 24.7* 24.7*    212 222 189 194 173 195 192       Clotting Studies    Recent Labs   Lab Test 09/30/20  0540 09/29/20  0523 09/28/20  0513 09/27/20  0428  09/22/20  1256  08/03/20  0559 08/02/20  0641 08/01/20  0702   INR 1.16* 1.17* 1.19* 1.15*   < > 1.13   < >  --   --   --    PTT  --   --   --   --   --  31  --  29 29 25    < > = values in this interval not displayed.       Arterial Blood Gas Testing    Recent Labs   Lab Test 07/24/20  0829 07/24/20  0413 07/23/20  2109 07/23/20  0404 07/22/20  2122 07/22/20  1608 07/22/20  1206 07/22/20  0845   PH  --   --   --  7.47* 7.44 7.44 7.43 7.41   PCO2  --   --   --  37 38 39 32* 34*   PO2  --   --   --  104 111* 80 103 123*   HCO3  --   --   --  27 26 27 22 21   O2PER 1.0L 1.5L 1.5L 40  40 40 40  40 40.0 40.0  40.0        Urine Studies     Recent Labs   Lab Test 09/25/20  1241 09/22/20  1448 08/25/20  0731 07/19/20  1930 07/17/20  1402   URINEPH 5.5 5.0 5.0 6.0 5.0   NITRITE Negative Negative Negative Negative Negative   LEUKEST Negative Negative Negative Negative Large*   WBCU 0 1 0 <1 13*       Tacrolimus levels    Invalid input(s): TACROLIMUS, TAC, TACR  Transplant  Immunosuppression Labs Latest Ref Rng & Units 9/30/2020 9/29/2020 9/28/2020 9/28/2020 9/27/2020   Tacro Level 5.0 - 15.0 ug/L 8.7 7.6 - 12.8 -   Tacro Level - Not Provided Not Provided - Not Provided -   Creat 0.66 - 1.25 mg/dL 1.47(H) 1.46(H) - 1.43(H) -   BUN 7 - 30 mg/dL 30 32(H) - 32(H) -   WBC 4.0 - 11.0 10e9/L 1.9(L) 1.8(L) 2.1(L) 2.0(L) 1.9(L)   Neutrophil % 85.1 79.4 - 75.6 -   ANEU 1.6 - 8.3 10e9/L 1.6 1.4(L) - 1.5(L) -       Microbiology:  Blood cx 1/2 with GPR   Wound cx swab with P aeruginosa and S mitis from the abdominal wound and P aeruginosa and Coag Neg Staph from the subclavian wound.   Surgical wound cx only with P aeruginosa.   Last check of C difficile  No results found for: CDBPCT    Virology:  CMV viral loads  No results found for: 72551, 29586, 40442, 28453, CMVQAL  CMV viral loads    Recent Labs   Lab Test 08/17/20  0847   CSPEC Plasma, EDTA anticoagulant   CMVLOG Not Calculated       CMV viral loads    Log IU/mL of CMVQNT   Date Value Ref Range Status   08/17/2020 Not Calculated <2.1 [Log_IU]/mL Final       CMV resistance testing  No lab results found.  No results found for: CMVCID, CMVFOS, CMVGAN     No results found for: H6RES    EBV DNA Copies/mL   Date Value Ref Range Status   08/17/2020 766 (A) EBVNEG^EBV DNA Not Detected [Copies]/mL Final       CMV Antibody IgG   Date Value Ref Range Status   07/19/2020 >8.0 (H) 0.0 - 0.8 AI Final     Comment:     Positive  Antibody index (AI) values reflect qualitative changes in antibody   concentration that cannot be directly associated with clinical condition or   disease state.     07/08/2019 >8.0 (H) 0.0 - 0.8 AI Final     Comment:     Positive  Antibody index (AI) values reflect qualitative changes in antibody   concentration that cannot be directly associated with clinical condition or   disease state.         Toxoplasma Antibody IgG   Date Value Ref Range Status   07/08/2019 <3.0 0.0 - 7.1 IU/mL Final     Comment:     Negative- Absence of  detectable Toxoplasma gondii IgG antibodies. A negative   result does not rule out acute infection.  The magnitude of the measured result is not indicative of the amount of   antibody present. The concentrations of anti-Toxoplasma gondii IgG in a given   specimen determined with assays from different manufacturers can vary due to   differences in assay methods and reagent specificity.           Imaging:  CT c/a/p 9/22/2020   IMPRESSION:   In this patient who is approximately 2 months postop orthotopic heart  transplant:   1. Left supraclavicular subcutaneous soft tissue thickening and fat  stranding is likely an infectious/inflammatory process originating  from the patient's prior left implantable cardiac defibrillator chest  wall pocket. No appreciable associated drainable fluid collection,  though this process is incompletely visualized, extending above the  field-of-view.  2. Simple appearing anterior mediastinal/pericardial fluid collection  with thin rim enhancement but no significant associated inflammatory  fat stranding, likely a postoperative serous collection. There is no  evidence of sternal wound involvement or communication with the  presumed left supraclavicular fossa cellulitis.   3. Small region of isolated skin/subcutaneous soft tissue thickening  in the epigastric region, likely corresponding to the site of a prior  pericardial drain and suggestive of cellulitis. No drainable fluid  collection at this location.   4. Retained vascular access cannula in the right supraclavicular fossa  with an adjacent elongated subclavian artery pseudoaneurysm, contained  by surrounded by soft tissue thickening.  4. Small left and trace right pleural effusions.  5. Cholelithiasis without cholecystitis.      09/30/2020

## 2020-09-30 NOTE — PROGRESS NOTES
Cardiovascular Surgery Progress Note  09/30/2020           Assessment and Plan:   Lucian Henderson Sr. is a 64 year old male with a PMH of end-stage ischemic cardiomyopathy, CAD s/p CABG 2008, DMT2, who was admitted to [facility] 07/03 for heart failure exacerbation with cardiogenic shock, underwent right subclavian IABP insertion 07/16 with Dr. Sparrow, and then heart transplant 07/20 with Dr. Griselli. 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 Now on Fondaparinux. Readmitted to [facility] 09/22 with hyperglycemia, CV surgery consulted for left former ICD pocket infection and former chest tube site infection. Underwent I&D in OR 09/23 with Dr. Huertas.     Cardiovascular:   S/p Heart transplant (7/20)  - Immunosuppression/ppx per cardiology, appreciate assistance  - Hydralazine 75 mg q8h for hypertension     Pulmonary:  Encourage IS, C&DB, ambulating  Saturating well on RA    Neuro/MSK:  Neuro intact  Acute post-operative pain - pain well controlled with IV dilaudid, PO oxycodone    /Renal:  DAYA on CKD, non-oliguric  Cr today 1.47, stable, baseline unclear. Is making urine. Low suspicion for obstruction. I/O and BUN/Cr ratio (~22) suggestive of pre-renal dehydration.   - encourage oral intake.  - Daily BMP    GI/FEN:   Consistent carb diet, continue bowel regimen, + BM  Replace lytes as needed    Endo:  Streroid induced hyperglycemia  Type II Diabetes Mellitus   - Endocrine consulted, appreciate assistance    Heme:   Acute blood loss anemia  - Hgb 7.0, stable; Plt WNL, no signs or symptoms of bleeding  - Hold-off on transfusions for now given risk of rejection.   Hx of HIT/DVT  - PTA fondaparinus 7.5 mg daily    ID:  Left subclavian surgical wound infection  Abdominal wound surgical wound infection  Leukopenia, moderate neutropenia  Blood culture (09/22/2020): Gram positive rods on 4th day of incubation; per ID, likely a contaminant. Noted to be bleeding from left subclavian wound, per  CVTS, large hematoma noted, debrided at bedside. Abdominal wound culture 09/23 growing strep mitis and pseudomonas. Left chest former ICD pocket culture 09/23 growing pseudomonas. CRP trending down, currently on ciprofloxacin 500 mg PO BID (End date 10/7). S/p repeat I&D on 9/28/20. Wound appears to be healing without signs of infection. Continues to have bleeding that is controlled with silver nitrate  - Continue TID dressing changes with dakins solution of both abdominal and left ICD pocket wounds  - Silver nitrate and quick clot prn for bleeding  - Discussed IV antibiotic with transplant ID. Since suseptibible to cipro and cipro has good bioavailability, would not recommend double coverage from pseudomonas and they do not think IV is necessary. Will have Dr. Huertas discuss with ID attending.     Prophylaxis:   GI ppx: protonix  DVT prophylaxis: Fonduparinox, PCD    Dispo:   TBD    Staff surgeons have been informed of changes through both verbal and written communication.       Donald Rand PA-C  Cardiothoracic Surgery  p: [phone]          Interval History:   No acute events overnight. States pain is well managed on current regimen, Breathing is okay. Tolerating diet, is passing flatus, + BM. Denies chest pain, palpitations, dizziness, syncopal symptoms, chills, myalgias or sternal popping/clicking.          Medications:       aspirin  81 mg Oral Daily     calcium carbonate 600 mg-vitamin D 400 units  1 tablet Oral BID w/meals     ciprofloxacin  500 mg Oral Q12H [facility]     clotrimazole  1 lozenge Buccal 4x Daily     fondaparinux ANTICOAGULANT  7.5 mg Subcutaneous Q24H     hydrALAZINE  75 mg Oral Q8H     insulin aspart  6 Units Subcutaneous QAM AC     insulin aspart  6 Units Subcutaneous Daily with lunch     insulin aspart  6 Units Subcutaneous Daily with supper     insulin aspart  1-10 Units Subcutaneous TID AC     insulin aspart  1-7 Units Subcutaneous At Bedtime     insulin isophane human  35 Units Subcutaneous  QAM     magnesium oxide  400 mg Oral BID     [Held by provider] mycophenolate  750 mg Oral BID     pantoprazole  40 mg Oral QAM AC     predniSONE  5 mg Oral QAM AC     predniSONE  5 mg Oral QPM     rosuvastatin  10 mg Oral Daily     senna-docusate  1 tablet Oral BID    Or     senna-docusate  2 tablet Oral BID     sodium chloride (PF)  3 mL Intracatheter Q8H     sodium hypochlorite   Topical TID     sulfamethoxazole-trimethoprim  1 tablet Oral Daily     tacrolimus  3 mg Oral QAM     tacrolimus  3.5 mg Oral QPM     tamsulosin  0.4 mg Oral Daily     valGANciclovir  450 mg Oral Daily     acetaminophen, - MEDICATION INSTRUCTIONS -, glucose **OR** dextrose **OR** glucagon, HOLD MEDICATION, HYDROmorphone, lidocaine 4%, lidocaine (buffered or not buffered), magnesium sulfate, magnesium sulfate, naloxone, ondansetron **OR** ondansetron, oxyCODONE IR, - MEDICATION INSTRUCTIONS -, polyethylene glycol, potassium chloride, potassium chloride with lidocaine, potassium chloride, potassium chloride, potassium chloride, ACE/ARB/ARNI NOT PRESCRIBED, senna-docusate, silver nitrate, sodium chloride (PF)          Physical Exam:   Vitals were reviewed  Blood pressure 139/72, pulse 79, temperature 99  F (37.2  C), temperature source Oral, resp. rate 16, weight 81.6 kg (179 lb 12.8 oz), SpO2 97 %.  Vitals:    09/26/20 0355 09/27/20 0330 09/30/20 0620   Weight: 84.6 kg (186 lb 8 oz) 84.2 kg (185 lb 11.2 oz) 81.6 kg (179 lb 12.8 oz)      I/O last 3 completed shifts:  In: -   Out: 1775 [Urine:1775]    Gen: A&Ox4, NAD  CV: RRR, normal S1, S2, no murmurs, rubs or gallops   Pulm: Lungs diminished in bases, otherwise CTA, no wheezing or rhonchi  Abd: Soft, NT, ND, +BS  Ext: 2+ bilateral LE edema  Neuro: Nonfocal  Wounds:   Dressing change of left ICD pocket packing. Packing removed, wound bed clean. There was an area of bleeding that was cauterized with 1 stick of silver nitrate. Repacked with small kerlex that was moistened with Dakin's solution  and covered with an ABD.      Removed packing of abdominal wound. Fibrinous exudate on the wound bed was debrided with gauze. This was repacked with nugauze moistened with Dakin's solution.          Data:    All labs and imaging reviewed by me.  Labs:    Data   Recent Labs   Lab 09/30/20  0540 09/29/20  0523 09/28/20  1654 09/28/20  0513   WBC 1.9* 1.8* 2.1* 2.0*   HGB 7.0* 7.0* 8.1* 7.3*    99 100 99    212 222 189   INR 1.16* 1.17*  --  1.19*    138  --  139   POTASSIUM 5.1 5.2  --  5.1   CHLORIDE 110* 109  --  110*   CO2 25 23  --  26   BUN 30 32*  --  32*   CR 1.47* 1.46*  --  1.43*   ANIONGAP 5 6  --  4   BRYANNA 8.4* 7.9*  --  8.2*   * 279*  --  161*     Recent Labs   Lab 09/30/20  0540 09/30/20  0238 09/29/20  2142 09/29/20  1943 09/29/20  1749 09/29/20  1446 09/29/20  1247  09/29/20  0523  09/28/20  0513  09/27/20  0428  09/26/20  0532  09/25/20  0549   *  --   --   --   --   --   --   --  279*  --  161*  --  168*  --  170*  --  97   BGM  --  162* 208* 282* 272* 370* 374*   < >  --    < >  --    < >  --    < >  --    < >  --     < > = values in this interval not displayed.        Imaging:  No results found for this or any previous visit (from the past 24 hour(s)).

## 2020-09-30 NOTE — CONSULTS
Diabetes/Hyperglycemia Management Consult    Chief Complaint poor glucose control, assist with management  Consult requested by: Donald Rand PA-C  History of Present Illness Lucian Henderson Sr is a 64 year old male with uncontrolled type 2 diabetes, hypertension, hyperlipidemia, CKD stage 3, hx of RIJ clot and remains on fondaparinux due to HIT hx, ICM s/p OHT 7/19/2020, obesity admitted on (9/22/2020) for left infraclavicular AICD pocket infection s/p I&D of left infraclavicular wound andmidline chest tube wound  on (9/29/2020) by Dr. Tong     Information was obtained via medical records and interview with patient. Lucian declined the use of a .     Lucian is known to the Inpatient Diabetes Service from previous admission, most recent was   (8/24 to 8/28/2020) due to hyperglycemia.  Plan for discharge: NPH 35 units am and no NPH at bedtime,   Aspart 15 units per meals ( meal target 75 grams carbohydrates)  Aspart/lispro correction  -169 give 2 units.  -199 give 4 units.  -229 give 6 units.  -259 give 8 units.  -289 give 10 units.  -319 give 12 units.  -349 give 14 units.  BG >/= 350 give 16 units.    Has had a virtual visit ( 9/1/2020) with Marisabel Prieto PA-C, endocrinology: per note, he was not using the sliding scale because he was unable to add the  fixed meal insulin dose + sliding scale. Was taking  35 NPH each morning and 15 with each 75 gram meal.  They have been giving 20 units if blood sugar is >200.  Review of blood sugars   The fixed meal was added to the sliding scale. ( as listed below)  Para -169 give 17 units antes de comer.  -199 give 19 units.  -229 give 21 units.  -259 give 23 units.  -289 give 25 units.  -319 give 27 units.  -349 give 29 units.  BG >/= 350 give 31 units.    Follow-up virtual visit with Marisabel Prieto PA-C: (9/15/2020): blood sugars before breakfast , before  lunch 120-199, and before dinner 130-200. Appears that Humulin 10 units was added with dinner during this appointment.     Lucian reports his PTA medications as listed below. Tests his blood sugars before meals and reports glucose has been variable. No hypoglycemia and occasional glucose > 200.   Glucose on presentation  187. NPH was continued at 35 units  am and novolog 6 units with meals. Blood sugars variable and have ranged from 89 to > 300.    Appetite is reported as good, denies nausea and/or vomiting.   Would prefer to be home.  Currently, denies fever, chills, chest pain, shortness of breath, abdominal pain, bowel or bladder issues.      Recent Labs   Lab 09/30/20  0540 09/30/20  0238 09/29/20  2142 09/29/20  1943 09/29/20  1749 09/29/20  1446 09/29/20  1247  09/29/20  0523  09/28/20  0513  09/27/20  0428  09/26/20  0532  09/25/20  0549   *  --   --   --   --   --   --   --  279*  --  161*  --  168*  --  170*  --  97   BGM  --  162* 208* 282* 272* 370* 374*   < >  --    < >  --    < >  --    < >  --    < >  --     < > = values in this interval not displayed.         Diabetes Type: type 2  Diabetes Duration: 20+ years  Usual Diabetes Regimen:   Patient reprots  NPH 35 units morning  15 units with meals  Ability to [place] Prescribed Regimen: with help from his wife and simplified plan. Has math illiteracy   Diabetes Control:   Lab Results   Component Value Date    A1C 8.2 07/03/2020    A1C 7.9 07/08/2019    A1C 8.5 03/11/2019    A1C 7.6 10/16/2018    A1C 9.8 09/06/2017     Diabetes Complications: proliferative retinopathy, CKD, CAD  History of DKA: no  Able to Detect Hypoglycemia: yes  Usual Diabetes Care Provider: Marisabel Prieto PA-C, most recent virtual visit was 9/15/2020  Factors Impacting Glucose Control: unclear administration , sterodis      Review of Systems  10 point ROS completed with pertinent positives and negatives noted in the HPI    Past medical, family and social histories are  reviewed and updated.    Past Medical History  Past Medical History:   Diagnosis Date     CAD (coronary artery disease)      CHF (congestive heart failure) (H)      CKD (chronic kidney disease), stage III (H)      Cortical cataract of both eyes      Diabetes (H)      Hyperlipidemia      Hypertension      Ischemic cardiomyopathy      Obesity      CHANTEL (obstructive sleep apnea)     occas cpap     Osteoarthritis        Family History  Family History   Problem Relation Age of Onset     Diabetes Brother      Diabetes Sister      Diabetes Sister      Macular Degeneration No family hx of      Glaucoma No family hx of      Myocardial Infarction No family hx of      Kidney Disease No family hx of        Social History  Social History     Socioeconomic History     Marital status:      Spouse name: None     Number of children: 4     Years of education: None     Highest education level: None   Occupational History     Occupation:      Employer: [employer]     Employer: RETIRED   Social Needs     Financial resource strain: None     Food insecurity     Worry: None     Inability: None     Transportation needs     Medical: None     Non-medical: None   Tobacco Use     Smoking status: Never Smoker     Smokeless tobacco: Never Used     Tobacco comment: Never smoked; non-smoking household   Substance and Sexual Activity     Alcohol use: No     Alcohol/week: 0.0 standard drinks     Drug use: No     Sexual activity: Yes     Partners: Female   Lifestyle     Physical activity     Days per week: None     Minutes per session: None     Stress: None   Relationships     Social connections     Talks on phone: None     Gets together: None     Attends Jewish service: None     Active member of club or organization: None     Attends meetings of clubs or organizations: None     Relationship status: None     Intimate partner violence     Fear of current or ex partner: None     Emotionally abused: None     Physically abused:  None     Forced sexual activity: None   Other Topics Concern     Parent/sibling w/ CABG, MI or angioplasty before 65F 55M? No   Social History Narrative     None         Physical Exam  /72 (BP Location: Right arm)   Pulse 79   Temp 99  F (37.2  C) (Oral)   Resp 16   Wt 81.6 kg (179 lb 12.8 oz)   SpO2 97%   BMI 29.92 kg/m      General:  Pleasant, sitting on edge of the bed, NAD   HEENT: PER and anicteric, non-injected, oral mucous membranes moist.   Lungs:  Non-labored  ABD: soft  Skin: warm and dry   MSK:  Moving all extremties  Mental status:  alert, oriented x3, communicating clearly  Psych:  calm, even mood    Laboratory  Recent Labs   Lab Test 09/30/20  0540 09/29/20  0523    138   POTASSIUM 5.1 5.2   CHLORIDE 110* 109   CO2 25 23   ANIONGAP 5 6   * 279*   BUN 30 32*   CR 1.47* 1.46*   BRYANNA 8.4* 7.9*     CBC RESULTS:   Recent Labs   Lab Test 09/30/20  0540   WBC 1.9*   RBC 2.27*   HGB 7.0*   HCT 22.7*      MCH 30.8   MCHC 30.8*   RDW 20.3*          Liver Function Studies -   Recent Labs   Lab Test 09/22/20  1256   PROTTOTAL 5.5*   ALBUMIN 2.5*   BILITOTAL 0.3   ALKPHOS 93   AST 17   ALT 26       Active Medications  Current Facility-Administered Medications   Medication     acetaminophen (TYLENOL) tablet 650 mg     aspirin (ASA) chewable tablet 81 mg     calcium carbonate 600 mg-vitamin D 400 units (CALTRATE) per tablet 1 tablet     ciprofloxacin (CIPRO) tablet 500 mg     clotrimazole (MYCELEX) lozenge 1 lozenge     Continuing beta blocker from home medication list OR beta blocker order already placed during this visit     glucose gel 15-30 g    Or     dextrose 50 % injection 25-50 mL    Or     glucagon injection 1 mg     fondaparinux ANTICOAGULANT (ARIXTRA) injection 7.5 mg     HOLD nitroGLYcerin IF     hydrALAZINE (APRESOLINE) tablet 75 mg     HYDROmorphone (PF) (DILAUDID) injection 0.3-0.5 mg     insulin aspart (NovoLOG) injection (RAPID ACTING)     insulin aspart  (NovoLOG) injection (RAPID ACTING)     insulin aspart (NovoLOG) injection (RAPID ACTING)     insulin aspart (NovoLOG) injection (RAPID ACTING)     insulin aspart (NovoLOG) injection (RAPID ACTING)     insulin isophane human (HumuLIN N PEN) injection 35 Units     lidocaine (LMX4) cream     lidocaine 1 % 0.1-1 mL     magnesium oxide (MAG-OX) tablet 400 mg     magnesium sulfate 2 g in water intermittent infusion     magnesium sulfate 4 g in 100 mL sterile water (premade)     [Held by provider] mycophenolate (GENERIC EQUIVALENT) capsule 750 mg     naloxone (NARCAN) injection 0.1-0.4 mg     ondansetron (ZOFRAN-ODT) ODT tab 4 mg    Or     ondansetron (ZOFRAN) injection 4 mg     oxyCODONE (ROXICODONE) tablet 5-10 mg     pantoprazole (PROTONIX) EC tablet 40 mg     Patient is already receiving anticoagulation with heparin, enoxaparin (LOVENOX), warfarin (COUMADIN)  or other anticoagulant medication     polyethylene glycol (MIRALAX) Packet 17 g     potassium chloride (KLOR-CON) Packet 20-40 mEq     potassium chloride 10 mEq in 100 mL intermittent infusion with 10 mg lidocaine     potassium chloride 10 mEq in 100 mL sterile water intermittent infusion (premix)     potassium chloride 20 mEq in 50 mL intermittent infusion     potassium chloride ER (KLOR-CON M) CR tablet 20-40 mEq     predniSONE (DELTASONE) tablet 5 mg     predniSONE (DELTASONE) tablet 5 mg     Reason ACE/ARB/ARNI order not selected     rosuvastatin (CRESTOR) tablet 10 mg     senna-docusate (SENOKOT-S/PERICOLACE) 8.6-50 MG per tablet 1 tablet    Or     senna-docusate (SENOKOT-S/PERICOLACE) 8.6-50 MG per tablet 2 tablet     senna-docusate (SENOKOT-S/PERICOLACE) 8.6-50 MG per tablet 1 tablet     silver nitrate (ARZOL) Misc 5 applicator     sodium chloride (PF) 0.9% PF flush 3 mL     sodium chloride (PF) 0.9% PF flush 3 mL     sodium hypochlorite (DAKINS) 0.5 % external solution     sulfamethoxazole-trimethoprim (BACTRIM) 400-80 MG per tablet 1 tablet     tacrolimus  (GENERIC EQUIVALENT) capsule 3 mg     tacrolimus (GENERIC EQUIVALENT) capsule 3.5 mg     tamsulosin (FLOMAX) capsule 0.4 mg     valGANciclovir (VALCYTE) tablet 450 mg     No current outpatient medications on file.       Current Diet  Orders Placed This Encounter      Consistent Carbohydrate Diet 6672-6517 Calories: Moderate Consistent CHO (4-6 CHO units/meal)        Assessment: Lucian Henderson Sr is a 64 year old male with uncontrolled type 2 diabetes, hypertension, hyperlipidemia, CKD stage 3, hx of RIJ clot and remains on fondaparinux due to HIT hx, ICM s/p OHT 7/19/2020, obesity admitted on (9/22/2020) for left infraclavicular AICD pocket infection s/p I&D of left infraclavicular wound and midline chest tube wound  on (9/29/2020) by Dr. Tong     Type 2 diabetes: uncontrolled with steroid induced hyperglycemia  Prednisone 5 mg am and 5 mg PM  Plan  - continue with NPH 35 units in the morning  -discontinue novolog 6 units with meals  -change to novolog 1 unit per 5 grams of  CHO for meals/snacks/supplements  - Novolog high correction scale before meals and at bedtime  - monitor glucose before meals, HS and 0200  -hypoglycemia protocol  -will continue to follow    Fred Arellano, [phone]  Diabetes Management Team job code: [id number]      I spent a total of 80 minutes bedside and on the inpatient unit managing the glycemic care of Lucian Henderson Sr.. Over 50% of my time on the unit was spent counseling the patient  and/or coordinating care regarding .  See note for details.

## 2020-09-30 NOTE — PLAN OF CARE
OT: Cancel, Pt refusing therapy at this time despite encouragement on benefits of OOB activity to build endurance and strength.  Will reschedule per POC.

## 2020-09-30 NOTE — PLAN OF CARE
[id number]  Patient is a 64 year old male admitted 9/22 for I&D of ICD pocket and old chest tube site with a PMH of OHT (7/2020), CAD, s/p CABG(2008), DM2, HIT, RUE, DVT, and urinary retention.     Neuro: AOx4  Cardiac: Denies cardiac symptoms  Telemetry: SR in 80-90s  Respiratory: RA  GI/: voiding without difficulty; LBM 9/30  Endocrine: BG checks with carb coverage-see eMAR  Diet/Appetite: good appetite-no concerns  Skin: No new skin concerns.  Abdominal dressings BID. L subclavian dressing change TID. See wound care orders  LDA: PIV x1 SL  Activity: up SBA/ind in room  Pain: denies pain, pain medications given for dressing change  Plan: Monitor dressings/wounds. Contact CVTS care team for questions and concerns.

## 2020-09-30 NOTE — PLAN OF CARE
/77 (BP Location: Right arm)   Pulse 81   Temp 98.7  F (37.1  C) (Oral)   Resp 16   Wt 84.2 kg (185 lb 11.2 oz)   SpO2 96%   BMI 30.90 kg/m      D: Patient is a 64 year old male admitted 9/22 referal from clinic d/t purulent discharge from ICD and abdomen.  S/P I&D of ICD pocket and old chest tube site with a PMH of OHT (7/2020).  I/A: Patient is A&OX4, AVSS, and denies pain and nausea.  BS were at 282 at HS, and 162 at 0240am and novolog correction given, with carb coverage done.  Patient declined his dressing changes. Patient slept between cares.  P: Will continue to monitor BS and keep it below 200.

## 2020-09-30 NOTE — PROGRESS NOTES
[facility]   Cardiology II Service / Advanced Heart Failure  Daily Progress Note  Date of Service: 9/30/2020      Patient: Lucian Henderson Sr.  MRN: [id number]  Admission Date: 9/22/2020  Hospital Day # 8    ASSESSMENT & PLAN: Mr. Tee Henderson is a 64 year old male w/ICM now s/p OHT 7/20/2020 c/b donor strep salvarius bacteremia, CAD s/p CABG in 2008, DM Type II, HIT, RUE DVT, urinary retention who presents to clinic today for routine heart transplant follow-up. He presents for Left subclavian surgical wound hematoma with infection and abdominal wound infection.     Left ICD pocket hematoma complicated by Pseudomonas infection. Blood culture positive for Cutibacterium, contaminant per ID. S/p left surgical wound debridement at bedside per CVTS, culture positive for Pseudomonas. Abdominal wound culture positive for strep mitis and Pseudomonas.   - Appreciate ID input.   - Continue Cipro 500 mg po BID  - If persistent bleeding at left subclavian site consider hematology consult for recommendations on Fondaparinux.      Status post Heart Transplantation on 7/20/20 due to ICM. Primary graft dysfunction, resolved.  Echo 9/23 with EF 55-60%, normal RV function, and no effusion. RHC 9/28 with mRA-8, RV-44/10, mPA-30, mPCWP-20, LIO CI-2.84 and LIO CO-6.34, biopsy negative.   Immunosuppression: Tac 3/3.5 with level 8.7 (goal 8-10 in setting of infection). Mycophenolate 750mg po BID on hold. Prednisone 5 mg po BID.   Prophylaxis: Valcyte, Bactrim, and Nystatin.   - Continue ASA and Crestor.    Leukopenia and Anemia. Prior Hematology consult 8/25 notes anemia multifactorial secondary to acute illness, inflammation, drug-related (new immunosuppressants particularly the MMF, Bactrim) and renal dysfunction. It was recommended by hematology at that time to continue fondaparinux for recent line associated RIJ DVT in the setting of HIT. Total duration of anticoagulation will be 3 months.   - WBC-1.9.   -  Hgb 7.      LE edema. Filling pressures stable per C 9/28.  - Encouraged protein intake, TEDS, and activity.   - Lymphedema consult.      RIJ and RUE DVT, provoked. US 7/22/20 consistent with nonocclusive thrombus in the right internal jugular vein along the intravenous catheter demonstrated, nonocclusive thrombus along the right PICC line in the right axillary vein demonstrated, and occlusive thrombus in the superficial right cephalic vein demonstrated as above. Follow up US negative 9/24.  - Given HIT history continue Fondaparinux for 3 months. If persistent bleeding at left subclavian site consider hematology consult for recommendations on Fondaparinux.      History of Donor Streptococcus salivarius bacteremia  - Transplant ID consult 7/21, treated with ceftriaxone. Course complete.  ================================================================    Interval History/ROS: He is feeling good and wants to go home. He denies fever, chills, chest pain, palpitations, cough, nausea, vomiting, and diarrhea. He complains of chronic LE edema. He is tolerating oral intake and ambulating.     Last 24 hr care team notes reviewed.   ROS:  4 point ROS including Respiratory, CV, GI and , other than that noted in the HPI, is negative.     Medications: Reviewed in EPIC.     Physical Exam:   /69 (BP Location: Right arm)   Pulse 92   Temp 98.6  F (37  C) (Oral)   Resp 16   Wt 81.6 kg (179 lb 12.8 oz)   SpO2 96%   BMI 29.92 kg/m    GENERAL: Appears alert and oriented times three.   HEENT: Eye symmetrical and free of discharge bilaterally. Mucous membranes moist and without lesions.  NECK: Supple and without lymphadenopathy. JVD below clavicular line  CV: RRR, S1S2 present without murmur, rub, or gallop.   RESPIRATORY: Respirations regular, even, and unlabored. Lungs CTA throughout.   GI: Soft and non distended with normoactive bowel sounds present in all quadrants. No tenderness, rebound, guarding. No organomegaly.    EXTREMITIES: +2-3 bilateral LE peripheral edema. 2+ bilateral pedal pulses.   NEUROLOGIC: Alert and orientated x 3. CN II-XII grossly intact. No focal deficits.   MUSCULOSKELETAL: No joint swelling or tenderness.   SKIN: No jaundice. No rashes or lesions. 2 cm abdominal wound with packing in place and no surrounding erythematous or drainage. Left subclavian wound covered.     Data:  CMP  Recent Labs   Lab 09/30/20  0540 09/29/20  0523 09/28/20  0513 09/27/20  0428    138 139 139   POTASSIUM 5.1 5.2 5.1 4.8   CHLORIDE 110* 109 110* 110*   CO2 25 23 26 22   ANIONGAP 5 6 4 7   * 279* 161* 168*   BUN 30 32* 32* 36*   CR 1.47* 1.46* 1.43* 1.38*   GFRESTIMATED 49* 49* 50* 53*   GFRESTBLACK 56* 57* 58* 61   BRYANNA 8.4* 7.9* 8.2* 8.5   MAG 1.8 1.9 1.7 2.0     CBC  Recent Labs   Lab 09/30/20  0540 09/29/20  0523 09/28/20  1654 09/28/20  0513   WBC 1.9* 1.8* 2.1* 2.0*   RBC 2.27* 2.30* 2.57* 2.45*   HGB 7.0* 7.0* 8.1* 7.3*   HCT 22.7* 22.8* 25.6* 24.2*    99 100 99   MCH 30.8 30.4 31.5 29.8   MCHC 30.8* 30.7* 31.6 30.2*   RDW 20.3* 20.1* 20.1* 19.9*    212 222 189     INR  Recent Labs   Lab 09/30/20  0540 09/29/20  0523 09/28/20  0513 09/27/20  0428   INR 1.16* 1.17* 1.19* 1.15*       Patient discussed with Dr. Holder.      Julia Berger [facility]  9/30/2020

## 2020-09-30 NOTE — PROGRESS NOTES
Care Coordinator Progress Note    Admission Date/Time:  9/22/2020  Attending MD:  Ran Huertas MD    Data  Chart reviewed, discussed with interdisciplinary team.   Patient was admitted for:    Postoperative infection, unspecified type, initial encounter  Sepsis without acute organ dysfunction, due to unspecified organism (H)  Heart replaced by transplant (H)  Heart transplant, orthotopic, status (H).    Assessment  Concerns with insurance coverage for discharge needs: None stated.  Current Living Situation: Patient lives with spouse.  Support System: Supportive and Involved spouse and children  Services Involved: OP CR  Transportation at Discharge: Family or friend will provide  Transportation to Medical Appointments: Son or daughter  Barriers to Discharge: Medical plan of care    Coordination of Care and Referrals: This writer met with pt and spouse to introduce self and role of RNCC. Pt declined  for our conversation. Pt stated that his wife has been his primary caregiver following transplant, but that two of his children live locally and have been assisting him as well.   Pt currently has two wounds, one at left ICD pocket site and an abdominal wound, both of which are currently being packed TID. Per PA, plan is likely to place a wound VAC on left subclavian site once it stops bleeding. This writer writer briefly discussed having home care services to assist with wound care and pt agreeable and states he would prefer to stay in the [facility] system.   OT currently recommending discharge to home with assist and OP CR.    Plan  Anticipated Discharge Date: TBD  Anticipated Discharge Plan: Discharge to home with provider recommendations  CC will continue to monitor patient's medical condition and progress towards discharge.  Jeannine Bray RN BSN  6C Unit Care Coordinator  Phone number: [phone]  Pager: [phone]

## 2020-10-01 ENCOUNTER — APPOINTMENT (OUTPATIENT)
Dept: INTERPRETER SERVICES | Facility: CLINIC | Age: 65
End: 2020-10-01
Payer: COMMERCIAL

## 2020-10-01 ENCOUNTER — APPOINTMENT (OUTPATIENT)
Dept: OCCUPATIONAL THERAPY | Facility: CLINIC | Age: 65
DRG: 857 | End: 2020-10-01
Payer: COMMERCIAL

## 2020-10-01 LAB
ANION GAP SERPL CALCULATED.3IONS-SCNC: 7 MMOL/L (ref 3–14)
BASOPHILS # BLD AUTO: 0 10E9/L (ref 0–0.2)
BASOPHILS NFR BLD AUTO: 0.9 %
BUN SERPL-MCNC: 27 MG/DL (ref 7–30)
CALCIUM SERPL-MCNC: 8.5 MG/DL (ref 8.5–10.1)
CHLORIDE SERPL-SCNC: 107 MMOL/L (ref 94–109)
CO2 SERPL-SCNC: 24 MMOL/L (ref 20–32)
CREAT SERPL-MCNC: 1.51 MG/DL (ref 0.66–1.25)
DIFFERENTIAL METHOD BLD: ABNORMAL
DOHLE BOD BLD QL SMEAR: PRESENT
EOSINOPHIL # BLD AUTO: 0 10E9/L (ref 0–0.7)
EOSINOPHIL NFR BLD AUTO: 0.9 %
ERYTHROCYTE [DISTWIDTH] IN BLOOD BY AUTOMATED COUNT: 21.1 % (ref 10–15)
GFR SERPL CREATININE-BSD FRML MDRD: 47 ML/MIN/{1.73_M2}
GLUCOSE BLDC GLUCOMTR-MCNC: 157 MG/DL (ref 70–99)
GLUCOSE BLDC GLUCOMTR-MCNC: 185 MG/DL (ref 70–99)
GLUCOSE BLDC GLUCOMTR-MCNC: 231 MG/DL (ref 70–99)
GLUCOSE BLDC GLUCOMTR-MCNC: 245 MG/DL (ref 70–99)
GLUCOSE SERPL-MCNC: 233 MG/DL (ref 70–99)
HCT VFR BLD AUTO: 25.8 % (ref 40–53)
HGB BLD-MCNC: 7.8 G/DL (ref 13.3–17.7)
INR PPP: 1.1 (ref 0.86–1.14)
LYMPHOCYTES # BLD AUTO: 0.3 10E9/L (ref 0.8–5.3)
LYMPHOCYTES NFR BLD AUTO: 17.4 %
MAGNESIUM SERPL-MCNC: 1.9 MG/DL (ref 1.6–2.3)
MCH RBC QN AUTO: 30.4 PG (ref 26.5–33)
MCHC RBC AUTO-ENTMCNC: 30.2 G/DL (ref 31.5–36.5)
MCV RBC AUTO: 100 FL (ref 78–100)
METHYLMALONATE SERPL-SCNC: 1 UMOL/L (ref 0–0.4)
MONOCYTES # BLD AUTO: 0.1 10E9/L (ref 0–1.3)
MONOCYTES NFR BLD AUTO: 4.3 %
NEUTROPHILS # BLD AUTO: 1.5 10E9/L (ref 1.6–8.3)
NEUTROPHILS NFR BLD AUTO: 76.5 %
NRBC # BLD AUTO: 0.1 10*3/UL
NRBC BLD AUTO-RTO: 5 /100
PLATELET # BLD AUTO: 233 10E9/L (ref 150–450)
PLATELET # BLD EST: ABNORMAL 10*3/UL
POIKILOCYTOSIS BLD QL SMEAR: SLIGHT
POTASSIUM SERPL-SCNC: 5.1 MMOL/L (ref 3.4–5.3)
RBC # BLD AUTO: 2.57 10E12/L (ref 4.4–5.9)
SODIUM SERPL-SCNC: 138 MMOL/L (ref 133–144)
WBC # BLD AUTO: 1.9 10E9/L (ref 4–11)

## 2020-10-01 PROCEDURE — 250N000013 HC RX MED GY IP 250 OP 250 PS 637: Performed by: STUDENT IN AN ORGANIZED HEALTH CARE EDUCATION/TRAINING PROGRAM

## 2020-10-01 PROCEDURE — 250N000013 HC RX MED GY IP 250 OP 250 PS 637: Performed by: INTERNAL MEDICINE

## 2020-10-01 PROCEDURE — 96372 THER/PROPH/DIAG INJ SC/IM: CPT | Performed by: STUDENT IN AN ORGANIZED HEALTH CARE EDUCATION/TRAINING PROGRAM

## 2020-10-01 PROCEDURE — 214N000001 HC R&B CCU UMMC

## 2020-10-01 PROCEDURE — 85025 COMPLETE CBC W/AUTO DIFF WBC: CPT | Performed by: STUDENT IN AN ORGANIZED HEALTH CARE EDUCATION/TRAINING PROGRAM

## 2020-10-01 PROCEDURE — 36415 COLL VENOUS BLD VENIPUNCTURE: CPT | Performed by: STUDENT IN AN ORGANIZED HEALTH CARE EDUCATION/TRAINING PROGRAM

## 2020-10-01 PROCEDURE — 250N000012 HC RX MED GY IP 250 OP 636 PS 637: Performed by: STUDENT IN AN ORGANIZED HEALTH CARE EDUCATION/TRAINING PROGRAM

## 2020-10-01 PROCEDURE — 250N000012 HC RX MED GY IP 250 OP 636 PS 637: Performed by: INTERNAL MEDICINE

## 2020-10-01 PROCEDURE — 80048 BASIC METABOLIC PNL TOTAL CA: CPT | Performed by: STUDENT IN AN ORGANIZED HEALTH CARE EDUCATION/TRAINING PROGRAM

## 2020-10-01 PROCEDURE — 83735 ASSAY OF MAGNESIUM: CPT | Performed by: STUDENT IN AN ORGANIZED HEALTH CARE EDUCATION/TRAINING PROGRAM

## 2020-10-01 PROCEDURE — 85610 PROTHROMBIN TIME: CPT | Performed by: STUDENT IN AN ORGANIZED HEALTH CARE EDUCATION/TRAINING PROGRAM

## 2020-10-01 PROCEDURE — 97140 MANUAL THERAPY 1/> REGIONS: CPT | Mod: GO

## 2020-10-01 PROCEDURE — 250N000011 HC RX IP 250 OP 636: Performed by: INTERNAL MEDICINE

## 2020-10-01 PROCEDURE — 250N000011 HC RX IP 250 OP 636: Performed by: STUDENT IN AN ORGANIZED HEALTH CARE EDUCATION/TRAINING PROGRAM

## 2020-10-01 PROCEDURE — 999N001017 HC STATISTIC GLUCOSE BY METER IP

## 2020-10-01 PROCEDURE — 99232 SBSQ HOSP IP/OBS MODERATE 35: CPT | Performed by: NURSE PRACTITIONER

## 2020-10-01 PROCEDURE — 99232 SBSQ HOSP IP/OBS MODERATE 35: CPT | Mod: ZP | Performed by: NURSE PRACTITIONER

## 2020-10-01 RX ADMIN — VALGANCICLOVIR 450 MG: 450 TABLET, FILM COATED ORAL at 09:08

## 2020-10-01 RX ADMIN — HYDRALAZINE HYDROCHLORIDE 75 MG: 25 TABLET ORAL at 21:45

## 2020-10-01 RX ADMIN — CIPROFLOXACIN HYDROCHLORIDE 500 MG: 500 TABLET, FILM COATED ORAL at 09:07

## 2020-10-01 RX ADMIN — MAGNESIUM OXIDE 400 MG: 400 TABLET ORAL at 09:08

## 2020-10-01 RX ADMIN — Medication: at 09:30

## 2020-10-01 RX ADMIN — DOCUSATE SODIUM 50 MG AND SENNOSIDES 8.6 MG 1 TABLET: 8.6; 5 TABLET, FILM COATED ORAL at 09:14

## 2020-10-01 RX ADMIN — PREDNISONE 5 MG: 5 TABLET ORAL at 19:49

## 2020-10-01 RX ADMIN — Medication: at 14:30

## 2020-10-01 RX ADMIN — INSULIN HUMAN 35 UNITS: 100 INJECTION, SUSPENSION SUBCUTANEOUS at 09:01

## 2020-10-01 RX ADMIN — CLOTRIMAZOLE 1 LOZENGE: 10 LOZENGE ORAL at 19:49

## 2020-10-01 RX ADMIN — Medication 1 TABLET: at 09:08

## 2020-10-01 RX ADMIN — HYDRALAZINE HYDROCHLORIDE 75 MG: 25 TABLET ORAL at 06:08

## 2020-10-01 RX ADMIN — CLOTRIMAZOLE 1 LOZENGE: 10 LOZENGE ORAL at 15:41

## 2020-10-01 RX ADMIN — PREDNISONE 5 MG: 5 TABLET ORAL at 09:08

## 2020-10-01 RX ADMIN — PANTOPRAZOLE SODIUM 40 MG: 40 TABLET, DELAYED RELEASE ORAL at 09:07

## 2020-10-01 RX ADMIN — TACROLIMUS 3.5 MG: 1 CAPSULE ORAL at 17:35

## 2020-10-01 RX ADMIN — ROSUVASTATIN CALCIUM 10 MG: 10 TABLET, FILM COATED ORAL at 09:06

## 2020-10-01 RX ADMIN — TACROLIMUS 3 MG: 1 CAPSULE ORAL at 09:06

## 2020-10-01 RX ADMIN — FONDAPARINUX SODIUM 7.5 MG: 7.5 INJECTION, SOLUTION SUBCUTANEOUS at 19:49

## 2020-10-01 RX ADMIN — SULFAMETHOXAZOLE AND TRIMETHOPRIM 1 TABLET: 400; 80 TABLET ORAL at 09:07

## 2020-10-01 RX ADMIN — MAGNESIUM OXIDE 400 MG: 400 TABLET ORAL at 19:49

## 2020-10-01 RX ADMIN — CIPROFLOXACIN HYDROCHLORIDE 500 MG: 500 TABLET, FILM COATED ORAL at 19:49

## 2020-10-01 RX ADMIN — MAGNESIUM SULFATE 2 G: 2 INJECTION INTRAVENOUS at 15:42

## 2020-10-01 RX ADMIN — Medication 1 TABLET: at 17:35

## 2020-10-01 RX ADMIN — CLOTRIMAZOLE 1 LOZENGE: 10 LOZENGE ORAL at 09:07

## 2020-10-01 RX ADMIN — Medication: at 19:51

## 2020-10-01 RX ADMIN — CLOTRIMAZOLE 1 LOZENGE: 10 LOZENGE ORAL at 13:23

## 2020-10-01 RX ADMIN — HYDRALAZINE HYDROCHLORIDE 75 MG: 25 TABLET ORAL at 13:23

## 2020-10-01 RX ADMIN — TAMSULOSIN HYDROCHLORIDE 0.4 MG: 0.4 CAPSULE ORAL at 09:07

## 2020-10-01 ASSESSMENT — ACTIVITIES OF DAILY LIVING (ADL)
ADLS_ACUITY_SCORE: 14

## 2020-10-01 NOTE — PROGRESS NOTES
[facility]   Cardiology II Service / Advanced Heart Failure  Daily Progress Note  Date of Service: 10/1/2020      Patient: Lucian Henderson Sr.  MRN: [id number]  Admission Date: 9/22/2020  Hospital Day # 9    Assessment and Plan:  Mr. Tee Henderson is a 64 year old male w/ICM now s/p OHT 7/20/2020 c/b donor strep salvarius bacteremia, CAD s/p CABG in 2008, DM Type II, HIT, RUE DVT, urinary retention who presents to clinic today for routine heart transplant follow-up. He presents for Left subclavian surgical wound hematoma with infection and abdominal wound infection.      Left ICD pocket hematoma complicated by Pseudomonas infection. Blood culture positive for Cutibacterium, contaminant per ID. S/p left surgical wound debridement at bedside per CVTS, culture positive for Pseudomonas. Abdominal wound culture positive for strep mitis and Pseudomonas.   - Appreciate ID input.   - Continue Cipro 500 mg po BID  - If persistent bleeding at left subclavian site consider hematology consult for recommendations on Fondaparinux.      Status post Heart Transplantation on 7/20/20 due to ICM. Primary graft dysfunction, resolved.  Echo 9/23 with EF 55-60%, normal RV function, and no effusion. RHC 9/28 with mRA-8, RV-44/10, mPA-30, mPCWP-20, LIO CI-2.84 and LIO CO-6.34, biopsy negative.   Immunosuppression: Tac 3/3.5 with level 8.7 9/30 (goal 8-10 in setting of infection). Mycophenolate 750mg po BID on hold. Prednisone 5 mg po BID.   Prophylaxis: Valcyte, Bactrim, and Nystatin.   - Continue ASA and Crestor.  - Next RCH/Biopsy/Echo due 10/12.     Leukopenia and Anemia. Prior Hematology consult 8/25 notes anemia multifactorial secondary to acute illness, inflammation, drug-related (new immunosuppressants particularly the MMF, Bactrim) and renal dysfunction. It was recommended by hematology at that time to continue fondaparinux for recent line associated RIJ DVT in the setting of HIT. Total duration of  anticoagulation will be 3 months.   - WBC-1.9 with ANC-1.5.  - Hgb 7.8.      LE edema. Filling pressures stable per C 9/28.  - Encouraged protein intake, TEDS, and activity.   - Lymphedema consult.     HTN.   - Intermittent elevation in BP, if persistent increase Hydralazine to 100 mg po TID.      RIJ and RUE DVT, provoked. US 7/22/20 consistent with nonocclusive thrombus in the right internal jugular vein along the intravenous catheter demonstrated, nonocclusive thrombus along the right PICC line in the right axillary vein demonstrated, and occlusive thrombus in the superficial right cephalic vein demonstrated as above. Follow up US negative 9/24.  - Given HIT history continue Fondaparinux for 3 months. If persistent bleeding at left subclavian site consider hematology consult for recommendations on Fondaparinux.      History of Donor Streptococcus salivarius bacteremia  - Transplant ID consult 7/21, treated with ceftriaxone. Course complete.  ================================================================    Interval History/ROS: He is eager to discharge to home. He denies fever, chills, chest pain, palpitations, ALBRIGHT, cough, nausea, vomiting, and diarrhea. He complains of LE edema, mild improvement today.     Last 24 hr care team notes reviewed.   ROS:  4 point ROS including Respiratory, CV, GI and , other than that noted in the HPI, is negative.     Medications: Reviewed in EPIC.     Physical Exam:   /67 (BP Location: Right arm)   Pulse 94   Temp 98.8  F (37.1  C) (Oral)   Resp 16   Wt 81.3 kg (179 lb 4.8 oz)   SpO2 99%   BMI 29.84 kg/m    GENERAL: Appears alert and oriented times three.   HEENT: Eye symmetrical and free of discharge bilaterally. Mucous membranes moist and without lesions.  NECK: Supple and without lymphadenopathy. JVD below clavicular line.   CV: RRR, S1S2 present without murmur, rub, or gallop.   RESPIRATORY: Respirations regular, even, and unlabored. Lungs CTA throughout.    GI: Soft and non distended with normoactive bowel sounds present in all quadrants. No tenderness, rebound, guarding. No organomegaly.   EXTREMITIES: +2 bilateral LE peripheral edema. 2+ bilateral pedal pulses.   NEUROLOGIC: Alert and orientated x 3. CN II-XII grossly intact. No focal deficits.   MUSCULOSKELETAL: No joint swelling or tenderness.   SKIN: No jaundice. No rashes or lesions. Abdomen and left chest covered.     Data:  CMP  Recent Labs   Lab 10/01/20  0603 09/30/20  0540 09/29/20  0523 09/28/20  0513    140 138 139   POTASSIUM 5.1 5.1 5.2 5.1   CHLORIDE 107 110* 109 110*   CO2 24 25 23 26   ANIONGAP 7 5 6 4   * 162* 279* 161*   BUN 27 30 32* 32*   CR 1.51* 1.47* 1.46* 1.43*   GFRESTIMATED 47* 49* 49* 50*   GFRESTBLACK 55* 56* 57* 58*   BRYANNA 8.5 8.4* 7.9* 8.2*   MAG 1.9 1.8 1.9 1.7     CBC  Recent Labs   Lab 10/01/20  0603 09/30/20  0540 09/29/20  0523 09/28/20  1654   WBC 1.9* 1.9* 1.8* 2.1*   RBC 2.57* 2.27* 2.30* 2.57*   HGB 7.8* 7.0* 7.0* 8.1*   HCT 25.8* 22.7* 22.8* 25.6*    100 99 100   MCH 30.4 30.8 30.4 31.5   MCHC 30.2* 30.8* 30.7* 31.6   RDW 21.1* 20.3* 20.1* 20.1*    182 212 222     INR  Recent Labs   Lab 10/01/20  0603 09/30/20  0540 09/29/20  0523 09/28/20  0513   INR 1.10 1.16* 1.17* 1.19*       Patient discussed with Dr. Holder.      Julia Berger [facility]  10/1/2020

## 2020-10-01 NOTE — PROGRESS NOTES
10/01/20 1600   General Information   Discipline OT   Onset of Edema   (acute on chronic)   Affected Body Part(s) Left LE;Right LE   Edema Etiology Surgery  (heart transplant)   Etiology Comments decreased muscle pump activation, hypervolemia   Pertinent history of current problem (PT: include personal factors and/or comorbidities that impact the POC; OT: include additional occupational profile info) Per chart: Mr. Tee Henderson is a 64 year old male w/ICM now s/p OHT 7/20/2020 c/b donor strep salvarius bacteremia, CAD s/p CABG in 2008, DM Type II, HIT, RUE DVT, urinary retention who presents to clinic today for routine heart transplant follow-up. He presents for Left subclavian surgical wound hematoma with infection and abdominal wound infection.    Edema Precaution Comments No known edema precautions.    Pain   Patient currently in pain Yes, see Vital Signs flowsheet   Edema Examination / Assessment   Skin Condition Intact;Pitting   Skin Condition Comments Skin intact with soft 3+ pitting edema present in BLEs, bruising present on LLE.   Scar No   Ulcerations No   Capillary Refill Symmetrical   ROM   Range of Motion (WFL) other (describe)   Description of Range of Motion Deficits Impaired ROM 2/2 significant BLE edema.   Strength   Strength (WFL) other (describe)   Description of Strength Deficits Grossly deconditioned.    Sensation   Sensation (WFL) no deficits were identified   Assessment/Plan   Patient presents with Edema   Assessment 3+ pitting edema in BLEs impacting optimal functional mobility and ADL performance.    Assessment of Occupational Performance 1-3 Performance Deficits   Identified Performance Deficits home management, dressing, community mobility, leisure   Clinical Decision Making (Complexity) Low complexity   Planned Edema Interventions Gradient compression bandaging;Fit for compression garment   Treatment Frequency 5x/week   Treatment Duration 1 week   Patient, Family and/or Staff in  agreement with plan of care. Yes   Risks and benefits of treatment have been explained. Yes   Total Evaluation Time   Total Evaluation Time (Minutes) 3

## 2020-10-01 NOTE — PLAN OF CARE
D- PMH of heart transplant on 07/2020 for ICM. Admitted on 09/22/20 for drainage from KIMBERLY chest and abdominal surgical sites.S/p I&D on 09/23/20.   A/I- VSS on RA.  Pt irritable and refusing cares. Left upper chest and abdomen dressing CDI.  BM this shift.  Voiding into urinal.    P- Continue to monitor dressing.

## 2020-10-01 NOTE — PROGRESS NOTES
Cardiovascular Surgery Progress Note  10/01/2020           Assessment and Plan:   Lucian Henderson Sr. is a 64 year old male with a PMH of end-stage ischemic cardiomyopathy, CAD s/p CABG 2008, DMT2, who was admitted to [facility] 07/03 for heart failure exacerbation with cardiogenic shock, underwent right subclavian IABP insertion 07/16 with Dr. Sparrow, and then heart transplant 07/20 with Dr. Griselli. 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 Now on Fondaparinux. Readmitted to [facility] 09/22 with hyperglycemia, CV surgery consulted for left former ICD pocket infection and former chest tube site infection. Underwent I&D in OR 09/23 with Dr. Huertas.     Cardiovascular:   S/p Heart transplant (7/20)  - Immunosuppression/ppx per cardiology, appreciate assistance  - Hydralazine 75 mg q8h for hypertension     Pulmonary:  Encourage IS, C&DB, ambulating  Saturating well on RA    Neuro/MSK:  Neuro intact  Acute post-operative pain - pain well controlled with IV dilaudid, PO oxycodone    /Renal:  DAYA on CKD, non-oliguric  Cr today 1.51, stable, baseline unclear. Is making urine. Low suspicion for obstruction. I/O and BUN/Cr ratio (~22) suggestive of pre-renal dehydration.   - encourage oral intake.  - Daily BMP    GI/FEN:   Consistent carb diet, continue bowel regimen, + BM  Replace lytes as needed    Endo:  Streroid induced hyperglycemia  Type II Diabetes Mellitus   - Endocrine consulted, appreciate assistance    Heme:   Acute blood loss anemia  - Hgb 7.8, stable; Plt WNL, no signs or symptoms of bleeding  - Hold-off on transfusions for now given risk of rejection.   Hx of HIT/DVT  - PTA fondaparinus 7.5 mg daily    ID:  Left subclavian surgical wound infection  Abdominal wound surgical wound infection  Leukopenia, moderate neutropenia, medications induced  Blood culture (09/22/2020): Gram positive rods on 4th day of incubation; per ID, likely a contaminant. Noted to be bleeding from left  subclavian wound, per CVTS, large hematoma noted, debrided at bedside. Abdominal wound culture 09/23 growing strep mitis and pseudomonas. Left chest former ICD pocket culture 09/23 growing pseudomonas. CRP trending down, currently on ciprofloxacin 500 mg PO BID (End date 10/12). S/p repeat I&D on 9/28/20. Wound appears to be healing without signs of infection. Continues to have bleeding that is controlled with silver nitrate  - Continue TID dressing changes with dakins solution of both abdominal and left ICD pocket wounds  - Silver nitrate and quick clot prn for bleeding  - Discussed IV antibiotic with transplant ID. Since suseptibible to cipro and cipro has good bioavailability, would not recommend double coverage from pseudomonas and they do not think IV is necessary.    Prophylaxis:   GI ppx: protonix  DVT prophylaxis: Fonduparinox, PCD    Dispo:   TBD    Staff surgeons have been informed of changes through both verbal and written communication.       Donald Rand PA-C  Cardiothoracic Surgery  p: [phone]          Interval History:   No acute events overnight. States pain is well managed on current regimen, Breathing is okay. Tolerating diet, is passing flatus, + BM. Denies chest pain, palpitations, dizziness, syncopal symptoms, chills, myalgias or sternal popping/clicking.          Medications:       aspirin  81 mg Oral Daily     calcium carbonate 600 mg-vitamin D 400 units  1 tablet Oral BID w/meals     ciprofloxacin  500 mg Oral Q12H MUSA     clotrimazole  1 lozenge Buccal 4x Daily     fondaparinux ANTICOAGULANT  7.5 mg Subcutaneous Q24H     hydrALAZINE  75 mg Oral Q8H     insulin aspart   Subcutaneous TID w/meals     insulin aspart  1-10 Units Subcutaneous TID AC     insulin aspart  1-7 Units Subcutaneous At Bedtime     insulin isophane human  35 Units Subcutaneous QAM     magnesium oxide  400 mg Oral BID     [Held by provider] mycophenolate  750 mg Oral BID     pantoprazole  40 mg Oral QAM AC      predniSONE  5 mg Oral QAM AC     predniSONE  5 mg Oral QPM     rosuvastatin  10 mg Oral Daily     senna-docusate  1 tablet Oral BID    Or     senna-docusate  2 tablet Oral BID     sodium chloride (PF)  3 mL Intracatheter Q8H     sodium hypochlorite   Topical TID     sulfamethoxazole-trimethoprim  1 tablet Oral Daily     tacrolimus  3 mg Oral QAM     tacrolimus  3.5 mg Oral QPM     tamsulosin  0.4 mg Oral Daily     valGANciclovir  450 mg Oral Daily     acetaminophen, - MEDICATION INSTRUCTIONS -, glucose **OR** dextrose **OR** glucagon, HOLD MEDICATION, HYDROmorphone, insulin aspart, lidocaine 4%, lidocaine (buffered or not buffered), magnesium sulfate, magnesium sulfate, naloxone, ondansetron **OR** ondansetron, oxyCODONE IR, - MEDICATION INSTRUCTIONS -, polyethylene glycol, potassium chloride, potassium chloride with lidocaine, potassium chloride, potassium chloride, potassium chloride, ACE/ARB/ARNI NOT PRESCRIBED, senna-docusate, silver nitrate, sodium chloride (PF)          Physical Exam:   Vitals were reviewed  Blood pressure 138/71, pulse 86, temperature 99  F (37.2  C), temperature source Oral, resp. rate 16, weight 81.3 kg (179 lb 4.8 oz), SpO2 95 %.  Vitals:    09/27/20 0330 09/30/20 0620 10/01/20 0601   Weight: 84.2 kg (185 lb 11.2 oz) 81.6 kg (179 lb 12.8 oz) 81.3 kg (179 lb 4.8 oz)      I/O last 3 completed shifts:  In: 240 [P.O.:240]  Out: 875 [Urine:875]    Gen: A&Ox4, NAD  CV: RRR, normal S1, S2, no murmurs, rubs or gallops   Pulm: Lungs diminished in bases, otherwise CTA, no wheezing or rhonchi  Abd: Soft, NT, ND, +BS  Ext: 2+ bilateral LE edema  Neuro: Nonfocal  Wounds: ICD pocket dressing mildly saturated         Data:    All labs and imaging reviewed by me.  Labs:    Data   Recent Labs   Lab 10/01/20  0603 09/30/20  0540 09/29/20  0523   WBC 1.9* 1.9* 1.8*   HGB 7.8* 7.0* 7.0*    100 99    182 212   INR 1.10 1.16* 1.17*    140 138   POTASSIUM 5.1 5.1 5.2   CHLORIDE 107 110* 109    CO2 24 25 23   BUN 27 30 32*   CR 1.51* 1.47* 1.46*   ANIONGAP 7 5 6   BRYANNA 8.5 8.4* 7.9*   * 162* 279*     Recent Labs   Lab 10/01/20  0603 10/01/20  0601 09/30/20  2123 09/30/20  1652 09/30/20  1253 09/30/20  0832 09/30/20  0540 09/30/20  0238 09/29/20  0523 09/29/20  0523 09/28/20  0513 09/28/20  0513 09/27/20  0428 09/27/20  0428 09/26/20  0532 09/26/20  0532   *  --   --   --   --   --  162*  --   --  279*  --  161*  --  168*  --  170*   BGM  --  231* 228* 249* 186* 163*  --  162*   < >  --    < >  --    < >  --    < >  --     < > = values in this interval not displayed.        Imaging:  No results found for this or any previous visit (from the past 24 hour(s)).

## 2020-10-01 NOTE — PLAN OF CARE
D-PMH of heart transplant on 07/2020 for ICM. Admitted on 09/22/20 for drainage from KIMBERLY chest and abdominal surgical sites.S/p ICD on 09/23/20.Afebrile. WBC 1.9. Hgb 7.0.  D-Dressings changed.  A- Grimacing during chest dressing change, but declined pain medications. Once wound was cleaned and packed, no bleeding noted.  P-Continue with current poc.

## 2020-10-01 NOTE — DOWNTIME EVENT NOTE
The EMR was down for 2 hours on 10/1/2020.    JOHN CUETO RN   was responsible for completing the paper charting during this time period.     The following information was re-entered into the system by JOHN CUETO RN: Problem list, Home meds, Flowsheet data, Intake and output, Orders and MAR    The following information will remain in the paper chart:     JOHN CUETO RN  10/1/2020

## 2020-10-01 NOTE — PLAN OF CARE
"EDEMA 6C: Edema evaluation completed and discussed POC. Education provided on physical anatomy, disease process, precautions, and treatment options. Skin cares completed, skin with soft 3+ pitting edema present throughout BLEs, bruising present in LLE. Applied GCB from MTP to knee creases using \"quick wrap technique\" for management of edema and protection of skin integrity to promote independence with functional mobility and ADLs. Patient reported comfort with fit and reviewed wear schedule. Encouraged pt to wear wraps for no longer than 48 hours and remove if numbness/tingling/soiling occurs.    OT 6C: cancel, pt declining activity today 2/2 back pain. Will reschedule per POC.   "

## 2020-10-01 NOTE — PROGRESS NOTES
Diabetes Consult Daily  Progress Note          Assessment/Plan:    Lucian Henderson Sr is a 64 year old male with uncontrolled type 2 diabetes, hypertension, hyperlipidemia, CKD stage 3, hx of RIJ clot and remains on fondaparinux due to HIT hx, ICM s/p OHT 7/19/2020, obesity admitted on (9/22/2020) for left infraclavicular AICD pocket infection s/p I&D of left infraclavicular wound and midline chest tube wound  on (9/29/2020) by Dr. Tong      Type 2 diabetes: uncontrolled with steroid induced hyperglycemia  Prednisone 5 mg am and 5 mg PM  Plan  -  NPH 35 units in the morning (0900)  - NPH 5 units at ( 2100)  -  novolog 1 unit per 5 grams of  CHO for meals/snacks/supplements  - Novolog high correction scale before meals and at bedtime  - monitor glucose before meals, HS and 0200  -hypoglycemia protocol                            Outpatient diabetes follow up: Marisabel Prieto PA-C  Plan discussed with patient, bedside RN, and pimary team via this note           Interval History:   The last 24 hours progress and nursing notes reviewed.    Declined    Blood sugars continue to be in the 200's.  Will add NPH at in the evening 2/2 glcusoe in the 200's.  Appetite is reported as good, denies n/v    PTA:  NPH 35 units am  The fixed meal was added to the sliding scale. ( as listed below)  Para -169 give 17 units antes de comer.  -199 give 19 units.  -229 give 21 units.  -259 give 23 units.  -289 give 25 units.  -319 give 27 units.  -349 give 29 units.  BG >/= 350 give 31 units.      Recent Labs   Lab 10/01/20  0603 10/01/20  0601 09/30/20  2123 09/30/20  1652 09/30/20  1253 09/30/20  0832 09/30/20  0540 09/30/20  0238 09/29/20  0523 09/29/20  0523 09/28/20  0513 09/28/20  0513 09/27/20  0428 09/27/20  0428 09/26/20  0532 09/26/20  0532   *  --   --   --   --   --  162*  --   --  279*  --  161*  --  168*  --  170*   BGM  --  231*  228* 249* 186* 163*  --  162*   < >  --    < >  --    < >  --    < >  --     < > = values in this interval not displayed.               Review of Systems:   See interval hx          Medications:     Orders Placed This Encounter      Consistent Carbohydrate Diet 4424-7379 Calories: Moderate Consistent CHO (4-6 CHO units/meal)       Physical Exam:  Gen: Alert, sitting in chair, NAD   HEENT:  mucous membranes are moist  Resp: non-labored  Ext: moving all extremites   Neuro:oriented x3, communicating clearly  /71 (BP Location: Right arm)   Pulse 86   Temp 99  F (37.2  C) (Oral)   Resp 16   Wt 81.3 kg (179 lb 4.8 oz)   SpO2 95%   BMI 29.84 kg/m             Data:     Lab Results   Component Value Date    A1C 8.2 07/03/2020    A1C 7.9 07/08/2019    A1C 8.5 03/11/2019    A1C 7.6 10/16/2018    A1C 9.8 09/06/2017              CBC RESULTS:   Recent Labs   Lab Test 10/01/20  0603   WBC 1.9*   RBC 2.57*   HGB 7.8*   HCT 25.8*      MCH 30.4   MCHC 30.2*   RDW 21.1*        Recent Labs   Lab Test 10/01/20  0603 09/30/20  0540    140   POTASSIUM 5.1 5.1   CHLORIDE 107 110*   CO2 24 25   ANIONGAP 7 5   * 162*   BUN 27 30   CR 1.51* 1.47*   BRYANNA 8.5 8.4*     Liver Function Studies -   Recent Labs   Lab Test 09/22/20  1256   PROTTOTAL 5.5*   ALBUMIN 2.5*   BILITOTAL 0.3   ALKPHOS 93   AST 17   ALT 26     Lab Results   Component Value Date    INR 1.10 10/01/2020    INR 1.16 09/30/2020    INR 1.17 09/29/2020    INR 1.19 09/28/2020    INR 1.15 09/27/2020    INR 1.20 09/26/2020    INR 1.16 09/25/2020    INR 1.18 09/24/2020    INR 1.16 09/23/2020    INR 1.13 09/22/2020    INR 1.11 08/25/2020    INR 1.16 08/17/2020         I spent a total of 25 minutes bedside and on the inpatient unit managing the glycemic care of Lucian Henderson Sr.. Over 50% of my time on the unit was spent counseling the patient  and/or coordinating care regarding .  See note for details.      Fred Arellano CNP  [phone]  Diabetes Management job code [id number]

## 2020-10-02 ENCOUNTER — APPOINTMENT (OUTPATIENT)
Dept: OCCUPATIONAL THERAPY | Facility: CLINIC | Age: 65
DRG: 857 | End: 2020-10-02
Payer: COMMERCIAL

## 2020-10-02 LAB
ABO + RH BLD: NORMAL
ABO + RH BLD: NORMAL
ANION GAP SERPL CALCULATED.3IONS-SCNC: 5 MMOL/L (ref 3–14)
ANISOCYTOSIS BLD QL SMEAR: ABNORMAL
BASOPHILS # BLD AUTO: 0 10E9/L (ref 0–0.2)
BASOPHILS NFR BLD AUTO: 0.9 %
BLD GP AB SCN SERPL QL: NORMAL
BLOOD BANK CMNT PATIENT-IMP: NORMAL
BUN SERPL-MCNC: 30 MG/DL (ref 7–30)
CALCIUM SERPL-MCNC: 8.4 MG/DL (ref 8.5–10.1)
CHLORIDE SERPL-SCNC: 107 MMOL/L (ref 94–109)
CO2 SERPL-SCNC: 26 MMOL/L (ref 20–32)
CREAT SERPL-MCNC: 1.49 MG/DL (ref 0.66–1.25)
DIFFERENTIAL METHOD BLD: ABNORMAL
EOSINOPHIL # BLD AUTO: 0 10E9/L (ref 0–0.7)
EOSINOPHIL NFR BLD AUTO: 0 %
ERYTHROCYTE [DISTWIDTH] IN BLOOD BY AUTOMATED COUNT: 21.2 % (ref 10–15)
GFR SERPL CREATININE-BSD FRML MDRD: 48 ML/MIN/{1.73_M2}
GLUCOSE BLDC GLUCOMTR-MCNC: 136 MG/DL (ref 70–99)
GLUCOSE BLDC GLUCOMTR-MCNC: 164 MG/DL (ref 70–99)
GLUCOSE BLDC GLUCOMTR-MCNC: 252 MG/DL (ref 70–99)
GLUCOSE BLDC GLUCOMTR-MCNC: 274 MG/DL (ref 70–99)
GLUCOSE BLDC GLUCOMTR-MCNC: 376 MG/DL (ref 70–99)
GLUCOSE SERPL-MCNC: 170 MG/DL (ref 70–99)
HCT VFR BLD AUTO: 23.7 % (ref 40–53)
HGB BLD-MCNC: 6.9 G/DL (ref 13.3–17.7)
HGB BLD-MCNC: 7.8 G/DL (ref 13.3–17.7)
INR PPP: 1.12 (ref 0.86–1.14)
LYMPHOCYTES # BLD AUTO: 0.1 10E9/L (ref 0.8–5.3)
LYMPHOCYTES NFR BLD AUTO: 11 %
MAGNESIUM SERPL-MCNC: 1.9 MG/DL (ref 1.6–2.3)
MCH RBC QN AUTO: 29.5 PG (ref 26.5–33)
MCHC RBC AUTO-ENTMCNC: 29.1 G/DL (ref 31.5–36.5)
MCV RBC AUTO: 101 FL (ref 78–100)
MONOCYTES # BLD AUTO: 0.1 10E9/L (ref 0–1.3)
MONOCYTES NFR BLD AUTO: 5.5 %
NEUTROPHILS # BLD AUTO: 0.9 10E9/L (ref 1.6–8.3)
NEUTROPHILS NFR BLD AUTO: 82.6 %
NRBC # BLD AUTO: 0 10*3/UL
NRBC BLD AUTO-RTO: 4 /100
PLATELET # BLD AUTO: 210 10E9/L (ref 150–450)
PLATELET # BLD EST: ABNORMAL 10*3/UL
POLYCHROMASIA BLD QL SMEAR: ABNORMAL
POTASSIUM SERPL-SCNC: 4.8 MMOL/L (ref 3.4–5.3)
RBC # BLD AUTO: 2.34 10E12/L (ref 4.4–5.9)
SODIUM SERPL-SCNC: 138 MMOL/L (ref 133–144)
SPECIMEN EXP DATE BLD: NORMAL
TACROLIMUS BLD-MCNC: 11.4 UG/L (ref 5–15)
TME LAST DOSE: NORMAL H
WBC # BLD AUTO: 1.1 10E9/L (ref 4–11)

## 2020-10-02 PROCEDURE — 80197 ASSAY OF TACROLIMUS: CPT | Performed by: STUDENT IN AN ORGANIZED HEALTH CARE EDUCATION/TRAINING PROGRAM

## 2020-10-02 PROCEDURE — 250N000012 HC RX MED GY IP 250 OP 636 PS 637: Performed by: STUDENT IN AN ORGANIZED HEALTH CARE EDUCATION/TRAINING PROGRAM

## 2020-10-02 PROCEDURE — 86850 RBC ANTIBODY SCREEN: CPT | Performed by: PHYSICIAN ASSISTANT

## 2020-10-02 PROCEDURE — 250N000013 HC RX MED GY IP 250 OP 250 PS 637: Performed by: NURSE PRACTITIONER

## 2020-10-02 PROCEDURE — 99233 SBSQ HOSP IP/OBS HIGH 50: CPT | Performed by: INTERNAL MEDICINE

## 2020-10-02 PROCEDURE — 250N000013 HC RX MED GY IP 250 OP 250 PS 637: Performed by: INTERNAL MEDICINE

## 2020-10-02 PROCEDURE — 96372 THER/PROPH/DIAG INJ SC/IM: CPT | Performed by: STUDENT IN AN ORGANIZED HEALTH CARE EDUCATION/TRAINING PROGRAM

## 2020-10-02 PROCEDURE — 83735 ASSAY OF MAGNESIUM: CPT | Performed by: STUDENT IN AN ORGANIZED HEALTH CARE EDUCATION/TRAINING PROGRAM

## 2020-10-02 PROCEDURE — 85018 HEMOGLOBIN: CPT | Performed by: PHYSICIAN ASSISTANT

## 2020-10-02 PROCEDURE — 250N000011 HC RX IP 250 OP 636: Performed by: STUDENT IN AN ORGANIZED HEALTH CARE EDUCATION/TRAINING PROGRAM

## 2020-10-02 PROCEDURE — 99232 SBSQ HOSP IP/OBS MODERATE 35: CPT | Mod: ZP | Performed by: NURSE PRACTITIONER

## 2020-10-02 PROCEDURE — 214N000001 HC R&B CCU UMMC

## 2020-10-02 PROCEDURE — 86900 BLOOD TYPING SEROLOGIC ABO: CPT | Performed by: PHYSICIAN ASSISTANT

## 2020-10-02 PROCEDURE — 85025 COMPLETE CBC W/AUTO DIFF WBC: CPT | Performed by: STUDENT IN AN ORGANIZED HEALTH CARE EDUCATION/TRAINING PROGRAM

## 2020-10-02 PROCEDURE — 97140 MANUAL THERAPY 1/> REGIONS: CPT | Mod: GO

## 2020-10-02 PROCEDURE — 999N001017 HC STATISTIC GLUCOSE BY METER IP

## 2020-10-02 PROCEDURE — 85610 PROTHROMBIN TIME: CPT | Performed by: STUDENT IN AN ORGANIZED HEALTH CARE EDUCATION/TRAINING PROGRAM

## 2020-10-02 PROCEDURE — 86901 BLOOD TYPING SEROLOGIC RH(D): CPT | Performed by: PHYSICIAN ASSISTANT

## 2020-10-02 PROCEDURE — 250N000012 HC RX MED GY IP 250 OP 636 PS 637: Performed by: INTERNAL MEDICINE

## 2020-10-02 PROCEDURE — 80048 BASIC METABOLIC PNL TOTAL CA: CPT | Performed by: STUDENT IN AN ORGANIZED HEALTH CARE EDUCATION/TRAINING PROGRAM

## 2020-10-02 PROCEDURE — 36415 COLL VENOUS BLD VENIPUNCTURE: CPT | Performed by: PHYSICIAN ASSISTANT

## 2020-10-02 PROCEDURE — 99232 SBSQ HOSP IP/OBS MODERATE 35: CPT | Performed by: NURSE PRACTITIONER

## 2020-10-02 PROCEDURE — 250N000011 HC RX IP 250 OP 636: Performed by: INTERNAL MEDICINE

## 2020-10-02 PROCEDURE — 250N000013 HC RX MED GY IP 250 OP 250 PS 637: Performed by: STUDENT IN AN ORGANIZED HEALTH CARE EDUCATION/TRAINING PROGRAM

## 2020-10-02 PROCEDURE — 36415 COLL VENOUS BLD VENIPUNCTURE: CPT | Performed by: STUDENT IN AN ORGANIZED HEALTH CARE EDUCATION/TRAINING PROGRAM

## 2020-10-02 RX ORDER — HYDRALAZINE HYDROCHLORIDE 100 MG/1
100 TABLET, FILM COATED ORAL EVERY 8 HOURS
Status: DISCONTINUED | OUTPATIENT
Start: 2020-10-02 | End: 2020-10-05 | Stop reason: HOSPADM

## 2020-10-02 RX ORDER — SULFAMETHOXAZOLE AND TRIMETHOPRIM 400; 80 MG/1; MG/1
1 TABLET ORAL DAILY
Status: DISCONTINUED | OUTPATIENT
Start: 2020-10-02 | End: 2020-10-05 | Stop reason: HOSPADM

## 2020-10-02 RX ADMIN — CLOTRIMAZOLE 1 LOZENGE: 10 LOZENGE ORAL at 12:13

## 2020-10-02 RX ADMIN — MAGNESIUM SULFATE 2 G: 2 INJECTION INTRAVENOUS at 13:43

## 2020-10-02 RX ADMIN — MAGNESIUM OXIDE 400 MG: 400 TABLET ORAL at 20:07

## 2020-10-02 RX ADMIN — Medication 1 TABLET: at 17:07

## 2020-10-02 RX ADMIN — CLOTRIMAZOLE 1 LOZENGE: 10 LOZENGE ORAL at 08:20

## 2020-10-02 RX ADMIN — PREDNISONE 5 MG: 5 TABLET ORAL at 08:20

## 2020-10-02 RX ADMIN — TACROLIMUS 3 MG: 1 CAPSULE ORAL at 08:20

## 2020-10-02 RX ADMIN — PANTOPRAZOLE SODIUM 40 MG: 40 TABLET, DELAYED RELEASE ORAL at 08:20

## 2020-10-02 RX ADMIN — CIPROFLOXACIN HYDROCHLORIDE 500 MG: 500 TABLET, FILM COATED ORAL at 08:20

## 2020-10-02 RX ADMIN — SULFAMETHOXAZOLE AND TRIMETHOPRIM 1 TABLET: 400; 80 TABLET ORAL at 17:06

## 2020-10-02 RX ADMIN — CLOTRIMAZOLE 1 LOZENGE: 10 LOZENGE ORAL at 17:07

## 2020-10-02 RX ADMIN — FONDAPARINUX SODIUM 7.5 MG: 7.5 INJECTION, SOLUTION SUBCUTANEOUS at 20:04

## 2020-10-02 RX ADMIN — HYDRALAZINE HYDROCHLORIDE 100 MG: 100 TABLET, FILM COATED ORAL at 21:54

## 2020-10-02 RX ADMIN — MAGNESIUM OXIDE 400 MG: 400 TABLET ORAL at 08:20

## 2020-10-02 RX ADMIN — ROSUVASTATIN CALCIUM 10 MG: 10 TABLET, FILM COATED ORAL at 08:20

## 2020-10-02 RX ADMIN — Medication 1 TABLET: at 08:21

## 2020-10-02 RX ADMIN — TAMSULOSIN HYDROCHLORIDE 0.4 MG: 0.4 CAPSULE ORAL at 08:20

## 2020-10-02 RX ADMIN — HYDRALAZINE HYDROCHLORIDE 100 MG: 100 TABLET, FILM COATED ORAL at 13:42

## 2020-10-02 RX ADMIN — CLOTRIMAZOLE 1 LOZENGE: 10 LOZENGE ORAL at 20:06

## 2020-10-02 RX ADMIN — CIPROFLOXACIN HYDROCHLORIDE 500 MG: 500 TABLET, FILM COATED ORAL at 20:06

## 2020-10-02 RX ADMIN — INSULIN HUMAN 35 UNITS: 100 INJECTION, SUSPENSION SUBCUTANEOUS at 08:21

## 2020-10-02 RX ADMIN — PREDNISONE 5 MG: 5 TABLET ORAL at 20:06

## 2020-10-02 RX ADMIN — ASPIRIN 81 MG: 81 TABLET, COATED ORAL at 10:48

## 2020-10-02 RX ADMIN — VALGANCICLOVIR 450 MG: 450 TABLET, FILM COATED ORAL at 08:20

## 2020-10-02 RX ADMIN — HYDRALAZINE HYDROCHLORIDE 75 MG: 25 TABLET ORAL at 06:21

## 2020-10-02 RX ADMIN — TACROLIMUS 3.5 MG: 1 CAPSULE ORAL at 17:06

## 2020-10-02 RX ADMIN — Medication: at 09:56

## 2020-10-02 ASSESSMENT — ACTIVITIES OF DAILY LIVING (ADL)
ADLS_ACUITY_SCORE: 14
ADLS_ACUITY_SCORE: 15
ADLS_ACUITY_SCORE: 14

## 2020-10-02 NOTE — PROGRESS NOTES
CM Discharge Care Management Discharge Note    Discharge Planning:  Expected Discharge Date: 10/03/20  Concerns to be Addressed:     Wound vac and home care    Anticipated Discharge Disposition:  1-2 days pending labs and medical stability  Anticipated Discharge Services:  Home Care RN, PT, OT  Anticipated Discharge DME:  Wound Vac    Patient/family educated on Medicare website which has current facility and service quality ratings:    Referrals Placed by CM/SW:  CM  Education Provided on the Discharge Plan:  Reviewed anticipated plan for discharge.  Reviewed/discussed home care.  Reviewed/discussed wound vac management.     Patient/Family in Agreement with the Plan:  Yes  Disposition Comments:    Pt status reviewed during care team rounds.  Team anticipates possible discharge over the weekend.  Pt will have a wound vac placed prior to discharge.      Met with pt and his wife.  Reviewed anticipated plan for discharge.  Reviewed home care and home bound status.  Plan for home health RN, PT, OT services.   Pt family will transport at discharge. No concerns regarding anticipated plan noted.    [facility] Wound Vac order placed.  Order # [id number].  Email referral made to [facility]  Discharge orders updated.       Gabrielle Koroma RN

## 2020-10-02 NOTE — PROVIDER NOTIFICATION
-------------------CRITICAL LAB VALUE-------------------    Lab Value:Hemoglobin 6.9  Patient status: Sleeping  Time of notification: 6:25 AM  MD notified:  Was notified.    Temp:  [98.1  F (36.7  C)-99  F (37.2  C)] 98.1  F (36.7  C)  Pulse:  [84-94] 84  Resp:  [12-18] 17  BP: (121-142)/(67-82) 141/78  SpO2:  [95 %-99 %] 96 %

## 2020-10-02 NOTE — PROGRESS NOTES
[facility]   Cardiology II Service / Advanced Heart Failure  Daily Progress Note  Date of Service: 10/2/2020      Patient: Lucian Henderson Sr.  MRN: [id number]  Admission Date: 9/22/2020  Hospital Day # 10    Assessment and Plan:  Mr. Tee Henderson is a 64 year old male w/ICM now s/p OHT 7/20/2020 c/b donor strep salvarius bacteremia, CAD s/p CABG in 2008, DM Type II, HIT, RUE DVT, urinary retention who presents to clinic today for routine heart transplant follow-up. He presents for Left subclavian surgical wound hematoma with infection and abdominal wound infection.     Recommendations today:  - Tac level in AM.   - Discontinue Valcyte, CMV DNA Quant in AM.   - If no improvement in 24-48 hours of ANC Cardiology would be ok with Neupogen at ID recommended dose of 3 mcg/kg. Defer today.    - Hydralazine 100 mg po TID.      Left ICD pocket hematoma complicated by Pseudomonas infection. Blood culture positive for Cutibacterium, contaminant per ID. S/p left surgical wound debridement at bedside per CVTS, culture positive for Pseudomonas. Abdominal wound culture positive for strep mitis and Pseudomonas.   - Appreciate ID input.   - Continue Cipro 500 mg po BID  - If persistent bleeding at left subclavian site consider hematology consult for recommendations on Fondaparinux.   - Wound vac per CVTS.      Status post Heart Transplantation on 7/20/20 due to ICM. Primary graft dysfunction, resolved.  Echo 9/23 with EF 55-60%, normal RV function, and no effusion. RHC 9/28 with mRA-8, RV-44/10, mPA-30, mPCWP-20, LIO CI-2.84 and LIO CO-6.34, biopsy negative.   Immunosuppression: Tac 3/3.5 with level 11.4 (outlier without change) repeat level in AM. (goal 8-10 in setting of infection). Mycophenolate on hold (prior dose 750 mg po BID). Prednisone 5 mg po BID.   Serostatus: CMV: D+/R+, EBV: D+/R+, Toxo: D+/R-  Prophylaxis: Continue Bactrim given D+ Toxo and Nystatin. Discontinue Valcyte, CMV DNA Quant in  AM.   - Continue ASA and Crestor.  - Next RCH/Biopsy/Echo due 10/12.  - Defer Flu vaccine at this time given minimal availability to mount appropriate immune response.      Leukopenia and Anemia. Prior Hematology consult 8/25 notes anemia multifactorial secondary to acute illness, inflammation, drug-related (new immunosuppressants particularly the MMF, Bactrim) and renal dysfunction. It was recommended by hematology at that time to continue fondaparinux for recent line associated RIJ DVT in the setting of HIT. Total duration of anticoagulation will be 3 months.   - WBC-1.1 with ANC-0.9.  - Hgb 7.5.   - Continue Bactrim given D+ Toxo.   - Discontinue Valcyte, CMV DNA Quant in AM.   - If no improvement in 24-48 hours of ANC Cardiology would be ok with Neupogen. Defer today.   - Neutropenic precautions.      LE edema. Filling pressures stable per [facility] 9/28.  - Encouraged protein intake, TEDS, and activity.   - Lymphedema consult.      HTN.   - Increase Hydralazine to 100 mg po TID.       RIJ and RUE DVT, provoked. US 7/22/20 consistent with nonocclusive thrombus in the right internal jugular vein along the intravenous catheter demonstrated, nonocclusive thrombus along the right PICC line in the right axillary vein demonstrated, and occlusive thrombus in the superficial right cephalic vein demonstrated as above. Follow up US negative 9/24.  - Given HIT history continue Fondaparinux for 3 months. If persistent bleeding at left subclavian site consider hematology consult for recommendations on Fondaparinux.      History of Donor Streptococcus salivarius bacteremia  - Transplant ID consult 7/21, treated with ceftriaxone. Course complete.  ================================================================    Interval History/ROS: He is feeling well and eager to go home. He denies fever, chills, chest pain, ALBRIGHT, cough, oral lesions, SOB, nausea, vomiting, and diarrhea. He denies pain or increased drainage to wounds. He notes  improvement to LE edema. He is tolerating oral intake and ambulation.     Last 24 hr care team notes reviewed.   ROS:  4 point ROS including Respiratory, CV, GI and , other than that noted in the HPI, is negative.     Medications: Reviewed in EPIC.     Physical Exam:   /75 (BP Location: Left arm)   Pulse 78   Temp 98  F (36.7  C) (Oral)   Resp 17   Wt 81.7 kg (180 lb 1.6 oz)   SpO2 99%   BMI 29.97 kg/m    GENERAL: Appears alert and oriented times three.   HEENT: Eye symmetrical and free of discharge bilaterally. Mucous membranes moist and without lesions.  NECK: Supple and without lymphadenopathy. JVD below clavicular line upright.   CV: RRR, S1S2 present without murmur, rub, or gallop.   RESPIRATORY: Respirations regular, even, and unlabored. Lungs CTA throughout.   GI: Soft and non distended with normoactive bowel sounds present in all quadrants. No tenderness, rebound, guarding. No organomegaly.   EXTREMITIES: +2 bilateral LE peripheral edema. 2+ bilateral pedal pulses.   NEUROLOGIC: Alert and orientated x 3. CN II-XII grossly intact. No focal deficits.   MUSCULOSKELETAL: No joint swelling or tenderness.   SKIN: No jaundice. No rashes or lesions. Left chest and abdomen covered.     Data:  CMP  Recent Labs   Lab 10/02/20  0537 10/01/20  0603 09/30/20  0540 09/29/20  0523    138 140 138   POTASSIUM 4.8 5.1 5.1 5.2   CHLORIDE 107 107 110* 109   CO2 26 24 25 23   ANIONGAP 5 7 5 6   * 233* 162* 279*   BUN 30 27 30 32*   CR 1.49* 1.51* 1.47* 1.46*   GFRESTIMATED 48* 47* 49* 49*   GFRESTBLACK 56* 55* 56* 57*   BRYANNA 8.4* 8.5 8.4* 7.9*   MAG 1.9 1.9 1.8 1.9     CBC  Recent Labs   Lab 10/02/20  0957 10/02/20  0537 10/01/20  0603 09/30/20  0540 09/29/20  0523   WBC  --  1.1* 1.9* 1.9* 1.8*   RBC  --  2.34* 2.57* 2.27* 2.30*   HGB 7.8* 6.9* 7.8* 7.0* 7.0*   HCT  --  23.7* 25.8* 22.7* 22.8*   MCV  --  101* 100 100 99   MCH  --  29.5 30.4 30.8 30.4   MCHC  --  29.1* 30.2* 30.8* 30.7*   RDW  --  21.2*  21.1* 20.3* 20.1*   PLT  --  210 233 182 212     INR  Recent Labs   Lab 10/02/20  0537 10/01/20  0603 09/30/20  0540 09/29/20  0523   INR 1.12 1.10 1.16* 1.17*       Patient discussed with Dr. Holder.      Julia Berger [facility]  10/2/2020

## 2020-10-02 NOTE — PROGRESS NOTES
[facility]    Transplant Infectious Diseases Inpatient Progress Note      Lucian Henderson . MRN# [id number]   YOB: 1955 Age: 64 year old   Date of Admission and time: 9/22/2020 12:31 PM             Recommendations: 1.   Continue ciprofloxacin 500mg PO q12hrs for 2 weeks from last OR (9/28/2020), last dose to be given PM of 10/12/2020  2. Leukopenia: patient 75 days out from transplant, usually CMV ppx is 90 days, however in patient who is D+/R+ could consider stopping valganciclovir and checking weekly CMV starting the day that it is stopped  3. Neutropenia: recommend Neupogen to keep ANC >1000  4. No significant diarrhea  5. Toxo serology +/- which puts the patient at high risk for toxoplasmosis, will need long term or indefinite bactrim        Summary of Presentation:   Transplants:  7/19/2020 (Heart), Postoperative day:  75     This patient is a 64 year old male with ICMP s/p OHT with basiliximab induction and TAC/MMF/prednisone maintenance.   Course complicated by DGF, HIT, RUE DVT.   Presented from cardiology clinic with wound infection.         Active Problems and Infectious Diseases Issues:   1. Surgical wound infection involving the left subclavian wound (prior ICD site) and the abdominal wound (prior drain site) with P aeruginosa.  The Coag Neg Staph is of no clinical value and does not need to be covered.   The S mitis on drainage only, less likely to be significant as tissue cultures with Pseudomonas only. The chimney graft in the right subclavian area does not seem to be involved and blood cultures remain negative.    Underwent surgical debridement 9/23/20, returned to OR on 9/28 for I&D of hematoma, some issues with bleeding. Cultures with Pseudomonas aeruginosa (pansusceptible). Given clinical stability and improvement of wound would with known susceptibilities there is no need for double coverage of Pseudomonas. Continue ciprofloxacin, which has  excellent oral bioavailability for skin and soft tissue infections.       2. EBV viremia  No signs or symptoms for PTLD.   Monitor EBV by PCR per your protocol.     3. Cutibacterium species in one set out of two from blood cx 9/22/2020- contaminant  Cutibacterium growing in one set out of two on day #4 is a contaminant.   This will not contaminate the graft in the right subclavian area.     4. Leukopenia  5. Neutropenia  WBC has been <2 for last few days, now with  (10/2) would give neupogen to keep ANC >1000. Patient is 75 days out from transplant with D+/R+ CMV status. Could consider stopping valganciclovir in an attempt to improve cell counts, if so would check weekly CMV starting the day valganciclovir is stopped.     6. Toxoplasma D+/R- status  Will require long term or indefinite Bactrim.          Old Problems and Infectious Diseases Issues:   1. Donor with S salivarius positive blood cx; the patient received the usual perioperative ABx then ceftriaxone for total ABx of 7 days.   2. Donor sputum with P fluorescens that failed to grow and Enterobacter spp. The recipient was not treated for these organisms.      Other Infectious Disease issues include:  - QTc 420 as of 9/22/202.   - PCP prophylaxis: Bactrim   - Serostatus: CMV D+/R+, EBV D+/R+, HSV1+/2-, VZV +  On VGCV for universal ppx.   - Immunization status: Too soon to be discussed.   - Gamma globulin status: not recently checked        Attestation:  I interviewed the patient and obtained history from the patient and by reviewing the patient's chart including outside records, microbiological data, and radiological data. All data are summarized in this note.    Meme Butterfield PA-C  Infectious Disease  Pager #[phone]    10/02/2020           Interim History of Present Illness:   Feeling well, sitting up in chair resting. Lymphedema wraps in place. No new concerns. Diarrhea resolving and no nausea or vomiting.         History of Present Illness:    Transplants:  7/19/2020 (Heart), Postoperative day:  75     This patient is a 64 year old male with ICMP s/p OHT with basiliximab induction and TAC/MMF/prednisone maintenance. He had ICD removed from the left subclavian area and right subclavian IABP placed then removed with chimney graft in place.   Course complicated by DGF, HIT, RUE DVT.  The patient presented to the transplant clinic 9/22/20 and was found to have erythema, swelling, tenderness and warmth of the left subclavian and midline abdominal wounds. He was sent to [facility] where blood cx were obtained, wound swab of the left subclavian wound were sent. The patient was then started on zosyn and vancomycin.     ID consulted.     The patient stated that 5 days before admission, he started to develop pain in the involved areas for which he took tylenol. He denied fever or chills. He denied seeing drainage. Denied trauma to the areas.     Exposure History  Was born in [place], stated that he lived [place] in the [place], lived in [place] for 50-60 years.   He lives in [place], [place]. Lives with wife and one dog.   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   He stated he was tested for TB in the remote past and was negative.   He denied exposure to TB.   No institutionalization.   He travels to [place] frequently. His last travel outside of [place] was to [place] 2 years ago.   No significant outdoors activities.                 Review of Systems:      As mentioned in the HPI otherwise negative by reviewing constitutional symptoms, central and peripheral neurological systems, respiratory system, cardiac system, GI system,  system, musculoskeletal, skin, allergy, and lymphatics.                Immunizations:     Immunization History   Administered Date(s) Administered     Influenza (IIV3) PF 10/05/2011, 10/15/2012     Influenza Vaccine IM > 6 months Valent IIV4 10/27/2015, 09/14/2016, 10/05/2017, 10/03/2018     Pneumococcal 23 valent  08/04/2009, 04/03/2015     TDAP Vaccine (Adacel) 08/04/2009            Allergies:     Allergies   Allergen Reactions     Heparin Heparin Induced Thrombocytopenia     HIT screen sent 7/18/2020. CORRINE confirmed positive             Medications:   Medications that Require Transfusion:     - MEDICATION INSTRUCTIONS -       - MEDICATION INSTRUCTIONS -       ACE/ARB/ARNI NOT PRESCRIBED       Scheduled Medications:     aspirin  81 mg Oral Daily     calcium carbonate 600 mg-vitamin D 400 units  1 tablet Oral BID w/meals     ciprofloxacin  500 mg Oral Q12H MUSA     clotrimazole  1 lozenge Buccal 4x Daily     fondaparinux ANTICOAGULANT  7.5 mg Subcutaneous Q24H     hydrALAZINE  100 mg Oral Q8H     insulin aspart   Subcutaneous TID w/meals     insulin aspart  1-10 Units Subcutaneous TID AC     insulin aspart  1-7 Units Subcutaneous At Bedtime     insulin isophane human  35 Units Subcutaneous QAM     insulin isophane human  5 Units Subcutaneous Q24H     magnesium oxide  400 mg Oral BID     pantoprazole  40 mg Oral QAM AC     predniSONE  5 mg Oral QAM AC     predniSONE  5 mg Oral QPM     rosuvastatin  10 mg Oral Daily     senna-docusate  1 tablet Oral BID    Or     senna-docusate  2 tablet Oral BID     sodium chloride (PF)  3 mL Intracatheter Q8H     sodium hypochlorite   Topical TID     tacrolimus  3 mg Oral QAM     tacrolimus  3.5 mg Oral QPM     tamsulosin  0.4 mg Oral Daily     valGANciclovir  450 mg Oral Daily               Physical Exam:   Temp: 98.1  F (36.7  C) Temp src: Oral BP: (!) 141/78 Pulse: 84   Resp: 17 SpO2: 96 % O2 Device: None (Room air)      Wt Readings from Last 4 Encounters:   10/02/20 81.7 kg (180 lb 1.6 oz)   08/28/20 76.9 kg (169 lb 8.5 oz)   08/17/20 79.8 kg (176 lb)   08/10/20 79.8 kg (176 lb)     General: awake, alert, and in NAD. Sitting up in chair.  HEENT: atraumatic, normocephalic. Anicteric sclerae with non-injected conjunctiva. Moist mucous membranes.  Respriatory: CTAB without wheeze or  rales  Cardiovascular: RRR, +S1/S2, no murmur.  Abdominal: soft, non-distended, non-tender. + bowel sounds.  Skin: L former pacemaker site with dressing recently changed by surgical team in place, no drainage. Abdominal wound is stable from previous- packed, edges are non-erythematous, there is no drainage, non-tender to palpation.  Extremities: +lymphedema wraps bilaterally.          Data:   No results found for: ACD4    Inflammatory Markers    Recent Labs   Lab Test 09/25/20  0549 09/23/20  0710 09/22/20  1256 08/25/20  0445 07/08/19  1021   CRP 16.0* 42.0* 16.0* <2.9 9.9       Immune Globulin Studies     Recent Labs   Lab Test 04/09/15  0721   IGG 1,310   IGM 38*          Metabolic Studies       Recent Labs   Lab Test 10/02/20  0537 10/01/20  0603 09/30/20  0540 09/29/20  0523 09/28/20  0513 09/27/20  0428 09/23/20  1147 09/23/20  1147 09/21/20  1017 09/21/20  1017 09/18/20  0954 08/17/20  0847 08/17/20  0847 07/22/20  1608 07/22/20  1608 07/03/20  1559 07/03/20  1559    138 140 138 139 139   < >  --    < > 133 134   < > 133   < > 140   < > 133   POTASSIUM 4.8 5.1 5.1 5.2 5.1 4.8   < >  --    < > 4.7 4.5   < > 4.8   < > 4.9   < > 4.4   CHLORIDE 107 107 110* 109 110* 110*   < >  --    < > 104 107   < > 102   < > 110*   < > 102   CO2 26 24 25 23 26 22   < >  --    < > 20 17*   < > 26   < > 25   < > 25   ANIONGAP 5 7 5 6 4 7   < >  --    < > 9 10   < > 6   < > 5   < > 6   BUN 30 27 30 32* 32* 36*   < >  --    < > 45* 40*   < > 67*   < > 40*   < > 33*   CR 1.49* 1.51* 1.47* 1.46* 1.43* 1.38*   < >  --    < > 1.57* 1.17   < > 1.67*   < > 1.47*   < > 1.65*   GFRESTIMATED 48* 47* 49* 49* 50* 53*   < >  --    < > 45* 64   < > 42*   < > 49*   < > 42*   * 233* 162* 279* 161* 168*   < >  --    < > 212* 252*   < > 391*   < > 134*   < > 229*   A1C  --   --   --   --   --   --   --   --   --   --   --   --   --   --   --   --  8.2*   BRYANNA 8.4* 8.5 8.4* 7.9* 8.2* 8.5   < >  --    < > 7.9* 8.0*   < > 8.3*   <  > 8.1*   < > 8.7   PHOS  --   --   --   --   --   --   --   --   --  1.8* 1.5*   < > 3.5   < > 2.8   < >  --    MAG 1.9 1.9 1.8 1.9 1.7 2.0   < >  --    < > 1.1* 1.5*   < > 1.9   < > 2.3   < > 2.2   LACT  --   --   --   --   --   --   --  1.3  --   --   --   --   --   --  1.0   < >  --    CKT  --   --   --   --   --   --   --   --   --   --   --   --  76  --   --   --   --     < > = values in this interval not displayed.       Hepatic Studies    Recent Labs   Lab Test 09/22/20  1256 09/14/20  1153 08/31/20  0856 08/25/20  1705 08/24/20  2026 08/17/20  0847 08/13/20  0713 07/08/19  1021 07/08/19  1021   BILITOTAL 0.3 0.3 0.4  --  0.5 0.5 0.6   < > 0.6   ALKPHOS 93 100 117  --  124 141 91   < > 126   ALBUMIN 2.5* 2.7* 2.7*  --  2.7* 2.7* 2.6*   < > 3.4   AST 17 19 13  --  12 13 16   < > 14   ALT 26 26 31  --  28 29 27   < > 20   LDH  --   --   --  433*  --   --   --   --  174    < > = values in this interval not displayed.       Pancreatitis testing    Recent Labs   Lab Test 08/17/20  0847 07/19/20  1613 07/08/19  1021 08/16/18  0834 07/05/17  0732 07/06/16  0717 06/27/15  0755   AMYLASE  --  36  --   --   --   --   --    TRIG 82  --  181* 297* 226* 225* 99       Hematology Studies      Recent Labs   Lab Test 10/02/20  0957 10/02/20  0537 10/01/20  0603 09/30/20  0540 09/29/20  0523 09/28/20  1654 09/28/20  0513 09/27/20  0428 09/27/20  0428   WBC  --  1.1* 1.9* 1.9* 1.8* 2.1* 2.0*   < > 1.7*   ANEU  --  0.9* 1.5* 1.6 1.4*  --  1.5*  --  1.1*   ALYM  --  0.1* 0.3* 0.2* 0.3*  --  0.3*  --  0.4*   ELAN  --  0.1 0.1 0.1 0.1  --  0.1  --  0.1   AEOS  --  0.0 0.0 0.0 0.0  --  0.1  --  0.0   HGB 7.8* 6.9* 7.8* 7.0* 7.0* 8.1* 7.3*   < > 7.3*   HCT  --  23.7* 25.8* 22.7* 22.8* 25.6* 24.2*   < > 23.6*   PLT  --  210 233 182 212 222 189   < > 173    < > = values in this interval not displayed.       Clotting Studies    Recent Labs   Lab Test 10/02/20  0537 10/01/20  0603 09/30/20  0540 09/29/20  0523 09/22/20  1256  09/22/20  1256 08/03/20  0559 08/03/20  0559 08/02/20  0641 08/01/20  0702   INR 1.12 1.10 1.16* 1.17*   < > 1.13   < >  --   --   --    PTT  --   --   --   --   --  31  --  29 29 25    < > = values in this interval not displayed.       Arterial Blood Gas Testing    Recent Labs   Lab Test 07/24/20  0829 07/24/20  0413 07/23/20  2109 07/23/20  0404 07/22/20  2122 07/22/20  1608 07/22/20  1206 07/22/20  0845   PH  --   --   --  7.47* 7.44 7.44 7.43 7.41   PCO2  --   --   --  37 38 39 32* 34*   PO2  --   --   --  104 111* 80 103 123*   HCO3  --   --   --  27 26 27 22 21   O2PER 1.0L 1.5L 1.5L 40  40 40 40  40 40.0 40.0  40.0        Urine Studies     Recent Labs   Lab Test 09/25/20  1241 09/22/20  1448 08/25/20  0731 07/19/20  1930 07/17/20  1402   URINEPH 5.5 5.0 5.0 6.0 5.0   NITRITE Negative Negative Negative Negative Negative   LEUKEST Negative Negative Negative Negative Large*   WBCU 0 1 0 <1 13*       Tacrolimus levels    Invalid input(s): TACROLIMUS, TAC, TACR  Transplant Immunosuppression Labs Latest Ref Rng & Units 10/2/2020 10/1/2020 9/30/2020 9/29/2020 9/28/2020   Tacro Level 5.0 - 15.0 ug/L 11.4 - 8.7 7.6 -   Tacro Level - EDTA PLASMA - Not Provided Not Provided -   Creat 0.66 - 1.25 mg/dL 1.49(H) 1.51(H) 1.47(H) 1.46(H) -   BUN 7 - 30 mg/dL 30 27 30 32(H) -   WBC 4.0 - 11.0 10e9/L 1.1(L) 1.9(L) 1.9(L) 1.8(L) 2.1(L)   Neutrophil % 82.6 76.5 85.1 79.4 -   ANEU 1.6 - 8.3 10e9/L 0.9(L) 1.5(L) 1.6 1.4(L) -       Microbiology:  Blood cx 1/2 with GPR   Wound cx swab with P aeruginosa and S mitis from the abdominal wound and P aeruginosa and Coag Neg Staph from the subclavian wound.   Surgical wound cx only with P aeruginosa.   Last check of C difficile  No results found for: CDBPCT    Virology:  CMV viral loads  No results found for: 59597, 33600, 77364, 52159, CMVQAL  CMV viral loads    Recent Labs   Lab Test 08/17/20  0847   CSPEC Plasma, EDTA anticoagulant   CMVLOG Not Calculated       CMV viral loads    Log  IU/mL of CMVQNT   Date Value Ref Range Status   08/17/2020 Not Calculated <2.1 [Log_IU]/mL Final       CMV resistance testing  No lab results found.  No results found for: CMVCID, CMVFOS, CMVGAN     No results found for: H6RES    EBV DNA Copies/mL   Date Value Ref Range Status   08/17/2020 766 (A) EBVNEG^EBV DNA Not Detected [Copies]/mL Final       CMV Antibody IgG   Date Value Ref Range Status   07/19/2020 >8.0 (H) 0.0 - 0.8 AI Final     Comment:     Positive  Antibody index (AI) values reflect qualitative changes in antibody   concentration that cannot be directly associated with clinical condition or   disease state.     07/08/2019 >8.0 (H) 0.0 - 0.8 AI Final     Comment:     Positive  Antibody index (AI) values reflect qualitative changes in antibody   concentration that cannot be directly associated with clinical condition or   disease state.         Toxoplasma Antibody IgG   Date Value Ref Range Status   07/08/2019 <3.0 0.0 - 7.1 IU/mL Final     Comment:     Negative- Absence of detectable Toxoplasma gondii IgG antibodies. A negative   result does not rule out acute infection.  The magnitude of the measured result is not indicative of the amount of   antibody present. The concentrations of anti-Toxoplasma gondii IgG in a given   specimen determined with assays from different manufacturers can vary due to   differences in assay methods and reagent specificity.           Imaging:  CT c/a/p 9/22/2020   IMPRESSION:   In this patient who is approximately 2 months postop orthotopic heart  transplant:   1. Left supraclavicular subcutaneous soft tissue thickening and fat  stranding is likely an infectious/inflammatory process originating  from the patient's prior left implantable cardiac defibrillator chest  wall pocket. No appreciable associated drainable fluid collection,  though this process is incompletely visualized, extending above the  field-of-view.  2. Simple appearing anterior mediastinal/pericardial fluid  collection  with thin rim enhancement but no significant associated inflammatory  fat stranding, likely a postoperative serous collection. There is no  evidence of sternal wound involvement or communication with the  presumed left supraclavicular fossa cellulitis.   3. Small region of isolated skin/subcutaneous soft tissue thickening  in the epigastric region, likely corresponding to the site of a prior  pericardial drain and suggestive of cellulitis. No drainable fluid  collection at this location.   4. Retained vascular access cannula in the right supraclavicular fossa  with an adjacent elongated subclavian artery pseudoaneurysm, contained  by surrounded by soft tissue thickening.  4. Small left and trace right pleural effusions.  5. Cholelithiasis without cholecystitis.      10/02/2020

## 2020-10-02 NOTE — PLAN OF CARE
CVTS assessed L chest wound this morning and repacked drsg. Drainage is much less than prior-plan for a woundVAC drsg to be placed by CVTS this afteroon. Abdominal wound healing well with minimal packing required and no bleeding noted.   Hgb this morning was 6.9-recheck level 7.8 so no plans for transfusion at this time.  Pt denies pain. Independent with cares in room. Wife at bedside most of day.  Continues on oral abx. Awaiting care coordinator to arrange home woundVAC supplies with discharge home likely soon.

## 2020-10-02 NOTE — PLAN OF CARE
D- PMH of heart transplant on 07/2020 for ICM. Admitted on 09/22/20 for drainage from KIMBERLY chest and abdominal surgical sites.S/p I&D on 09/23/20.   A/I- VSS on RA. Tele SR.  Left upper chest and abdomen dry dressing CDI.  BM this shift.  Voiding not saving into toilet.   at 0200.     P- Continue to monitor dressing and blood sugars.     Problem: Infection  Goal: Infection Symptom Resolution  Outcome: No Change

## 2020-10-02 NOTE — PROGRESS NOTES
Diabetes Consult Daily  Progress Note          Assessment/Plan:    Lucian Henderson Sr is a 64 year old male with uncontrolled type 2 diabetes, hypertension, hyperlipidemia, CKD stage 3, hx of RIJ clot and remains on fondaparinux due to HIT hx, ICM s/p OHT 7/19/2020, obesity admitted on (9/22/2020) for left infraclavicular AICD pocket infection s/p I&D of left infraclavicular wound and midline chest tube wound  on (9/29/2020) by Dr. Tong      Type 2 diabetes: uncontrolled with steroid induced hyperglycemia  Prednisone 5 mg am and 5 mg PM  Plan  -  NPH 35 units in the morning (0900)  - NPH 5 units at ( 2100)  -  novolog 1 unit per 5 grams of  CHO for meals/snacks/supplements  - Novolog high correction scale before meals and at bedtime  - monitor glucose before meals, HS and 0200  -hypoglycemia protocol                            Outpatient diabetes follow up: Marisbael Prieto PA-C  Plan discussed with patient, bedside RN, and pimary team via this note           Interval History:   The last 24 hours progress and nursing notes reviewed.    Declined    Blood sugars have improved with the addition of NPH in the evening  Glucose at night 157--> 136 ( 0200) --> 170 --> 164 in am  Timing of sliding scale and meal insulin continues to be an issues, impacting pre-meal glucose to 274  Appetite is reported as good, denies n/v      PTA:  NPH 35 units am  The fixed meal was added to the sliding scale. ( as listed below)  Para -169 give 17 units antes de comer.  -199 give 19 units.  -229 give 21 units.  -259 give 23 units.  -289 give 25 units.  -319 give 27 units.  -349 give 29 units.  BG >/= 350 give 31 units.      Recent Labs   Lab 10/02/20  0537 10/02/20  0202 10/01/20  2142 10/01/20  1257 10/01/20  0858 10/01/20  0603 10/01/20  0601 09/30/20  2123 09/30/20  0540 09/30/20  0540 09/29/20  0523 09/29/20  0523 09/28/20  0513 09/28/20  0513  09/27/20  0428 09/27/20  0428   *  --   --   --   --  233*  --   --   --  162*  --  279*  --  161*  --  168*   BGM  --  136* 157* 185* 245*  --  231* 228*   < >  --    < >  --    < >  --    < >  --     < > = values in this interval not displayed.               Review of Systems:   See interval hx          Medications:     Orders Placed This Encounter      Consistent Carbohydrate Diet 4987-5771 Calories: Moderate Consistent CHO (4-6 CHO units/meal)       Physical Exam:  Gen: Alert, sitting in chair, NAD   HEENT:  mucous membranes are moist  Resp: non-labored  Ext: moving all extremites   Neuro:oriented x3, communicating clearly  BP (!) 141/78 (BP Location: Right arm)   Pulse 84   Temp 98.1  F (36.7  C) (Oral)   Resp 17   Wt 81.7 kg (180 lb 1.6 oz)   SpO2 96%   BMI 29.97 kg/m             Data:     Lab Results   Component Value Date    A1C 8.2 07/03/2020    A1C 7.9 07/08/2019    A1C 8.5 03/11/2019    A1C 7.6 10/16/2018    A1C 9.8 09/06/2017              CBC RESULTS:   Recent Labs   Lab Test 10/01/20  0603   WBC 1.9*   RBC 2.57*   HGB 7.8*   HCT 25.8*      MCH 30.4   MCHC 30.2*   RDW 21.1*        Recent Labs   Lab Test 10/01/20  0603 09/30/20  0540    140   POTASSIUM 5.1 5.1   CHLORIDE 107 110*   CO2 24 25   ANIONGAP 7 5   * 162*   BUN 27 30   CR 1.51* 1.47*   BRYANNA 8.5 8.4*     Liver Function Studies -   Recent Labs   Lab Test 09/22/20  1256   PROTTOTAL 5.5*   ALBUMIN 2.5*   BILITOTAL 0.3   ALKPHOS 93   AST 17   ALT 26     Lab Results   Component Value Date    INR 1.10 10/01/2020    INR 1.16 09/30/2020    INR 1.17 09/29/2020    INR 1.19 09/28/2020    INR 1.15 09/27/2020    INR 1.20 09/26/2020    INR 1.16 09/25/2020    INR 1.18 09/24/2020    INR 1.16 09/23/2020    INR 1.13 09/22/2020    INR 1.11 08/25/2020    INR 1.16 08/17/2020         I spent a total of 25 minutes bedside and on the inpatient unit managing the glycemic care of Lucian Henderson Sr.. Over 50% of my time on  the unit was spent counseling the patient  and/or coordinating care regarding .  See note for details.      Fred Arellano [phone]  Diabetes Management job code [id number]

## 2020-10-02 NOTE — PROGRESS NOTES
Transplant Social Work Services Progress Note      Date of Initial Social Work Evaluation: 9/28/2020  Collaborated with: Pt and pt's wifeShivani     Data: Pt is s/p OHT 7/19/2020. Pt admitted 9/22 for subclavian abscess. Pt s/p I&D 9/23. Anticipate needing wound vac.     Intervention: Supportive Visit   Assessment: Pt and wife pleasant. Pt is eager to discharge. Pt nor wife have no concerns with pt eventually discharging home. Provided with information for [facility] as we discussed earlier in the week.   Education provided by SW: Ongoing Social Work support, resources through [facility] Line   Plan:    Discharge Plans in Progress: Anticipate return home.     Barriers to d/c plan: Medical Readiness     Follow up Plan: SW to continue to follow.

## 2020-10-02 NOTE — PROGRESS NOTES
Cardiovascular Surgery Progress Note  10/02/2020           Assessment and Plan:   Lucian Henderson Sr. is a 64 year old male with a PMH of end-stage ischemic cardiomyopathy, CAD s/p CABG 2008, DMT2, who was admitted to [facility] 07/03 for heart failure exacerbation with cardiogenic shock, underwent right subclavian IABP insertion 07/16 with Dr. Sparrow, and then heart transplant 07/20 with Dr. Griselli. 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 Now on Fondaparinux. Readmitted to [facility] 09/22 with hyperglycemia, CV surgery consulted for left former ICD pocket infection and former chest tube site infection. Underwent I&D in OR 09/23 with Dr. Huertas.     Cardiovascular:   S/p Heart transplant (7/20)  - Immunosuppression/ppx per cardiology, appreciate assistance  - Hydralazine 75 mg q8h for hypertension     Pulmonary:  Encourage IS, C&DB, ambulating  Saturating well on RA    Neuro/MSK:  Neuro intact  Acute post-operative pain - pain well controlled with IV dilaudid, PO oxycodone    /Renal:  DAYA on CKD, non-oliguric  Cr today 1.49, stable, baseline unclear. Is making urine. Low suspicion for obstruction. I/O and BUN/Cr ratio (~22) suggestive of pre-renal dehydration.   - encourage oral intake.  - Daily BMP    GI/FEN:   Consistent carb diet, continue bowel regimen, + BM  Replace lytes as needed    Endo:  Streroid induced hyperglycemia  Type II Diabetes Mellitus   - Endocrine consulted, appreciate assistance    Heme:   Acute blood loss anemia  - Hgb 6.9, 7.8 on recheck, stable; Plt WNL, no signs or symptoms of bleeding  - Hold-off on transfusions for now given risk of rejection.   Hx of HIT/DVT  - PTA fondaparinus 7.5 mg daily    ID:  Left subclavian surgical wound infection  Abdominal wound surgical wound infection  Leukopenia, moderate neutropenia, medications induced  Blood culture (09/22/2020): Gram positive rods on 4th day of incubation; per ID, likely a contaminant. Noted to be  bleeding from left subclavian wound, per CVTS, large hematoma noted, debrided at bedside. Abdominal wound culture 09/23 growing strep mitis and pseudomonas. Left chest former ICD pocket culture 09/23 growing pseudomonas. CRP trending down, currently on ciprofloxacin 500 mg PO BID (End date 10/12). S/p repeat I&D on 9/28/20. Wound appears to be healing without signs of infection. Continues to have bleeding that is controlled with silver nitrate  - Continue TID dressing changes with dakins solution of both abdominal and left ICD pocket wounds  - Silver nitrate and quick clot prn for bleeding  - Discussed IV antibiotic with transplant ID. Since suseptibible to cipro and cipro has good bioavailability, would not recommend double coverage from pseudomonas and they do not think IV is necessary.  - No bleeding from pacer pocket today. Will place wound vac. Wound dimensions 2.5 cm deep, 5 cm long, 3 cm wide. No tunneling or undermining, minimal slough.   - Patient very neutropenic, ID recommending stopping Valcyte, weekly CMV, and one time dose of Neupogen. Will discuss with Cardiology.     Prophylaxis:   GI ppx: protonix  DVT prophylaxis: Fonduparinox, PCD    Dispo:   - possible discharge tomorrow  - Wound vac ordered     Staff surgeons have been informed of changes through both verbal and written communication.       Claudia Chapman PA-C  Cardiothoracic Surgery  Pager [phone]  October 2, 2020            Interval History:   No acute events overnight. States pain is well managed on current regimen, Breathing is okay. Tolerating diet, is passing flatus, + BM. Denies chest pain, palpitations, dizziness, syncopal symptoms, chills, myalgias or sternal popping/clicking.          Medications:       aspirin  81 mg Oral Daily     calcium carbonate 600 mg-vitamin D 400 units  1 tablet Oral BID w/meals     ciprofloxacin  500 mg Oral Q12H MUSA     clotrimazole  1 lozenge Buccal 4x Daily     fondaparinux ANTICOAGULANT  7.5 mg  Subcutaneous Q24H     hydrALAZINE  100 mg Oral Q8H     insulin aspart   Subcutaneous TID w/meals     insulin aspart  1-10 Units Subcutaneous TID AC     insulin aspart  1-7 Units Subcutaneous At Bedtime     insulin isophane human  35 Units Subcutaneous QAM     insulin isophane human  5 Units Subcutaneous Q24H     magnesium oxide  400 mg Oral BID     pantoprazole  40 mg Oral QAM AC     predniSONE  5 mg Oral QAM AC     predniSONE  5 mg Oral QPM     rosuvastatin  10 mg Oral Daily     senna-docusate  1 tablet Oral BID    Or     senna-docusate  2 tablet Oral BID     sodium chloride (PF)  3 mL Intracatheter Q8H     sodium hypochlorite   Topical TID     tacrolimus  3 mg Oral QAM     tacrolimus  3.5 mg Oral QPM     tamsulosin  0.4 mg Oral Daily     valGANciclovir  450 mg Oral Daily     acetaminophen, - MEDICATION INSTRUCTIONS -, glucose **OR** dextrose **OR** glucagon, HOLD MEDICATION, HYDROmorphone, insulin aspart, lidocaine 4%, lidocaine (buffered or not buffered), magnesium sulfate, magnesium sulfate, naloxone, ondansetron **OR** ondansetron, oxyCODONE IR, - MEDICATION INSTRUCTIONS -, polyethylene glycol, potassium chloride, potassium chloride with lidocaine, potassium chloride, potassium chloride, potassium chloride, ACE/ARB/ARNI NOT PRESCRIBED, senna-docusate, silver nitrate, sodium chloride (PF)          Physical Exam:   Vitals were reviewed  Blood pressure 130/75, pulse 78, temperature 98  F (36.7  C), temperature source Oral, resp. rate 17, weight 81.7 kg (180 lb 1.6 oz), SpO2 99 %.  Vitals:    09/30/20 0620 10/01/20 0601 10/02/20 0157   Weight: 81.6 kg (179 lb 12.8 oz) 81.3 kg (179 lb 4.8 oz) 81.7 kg (180 lb 1.6 oz)      I/O last 3 completed shifts:  In: 597 [P.O.:597]  Out: 300 [Urine:300]    Gen: A&Ox4, NAD  CV: RRR, normal S1, S2, no murmurs, rubs or gallops   Pulm: Lungs diminished in bases, otherwise CTA, no wheezing or rhonchi  Abd: Soft, NT, ND, +BS  Ext: 2+ bilateral LE edema, legs wrapped   Neuro:  Nonfocal  Wounds: No bleeding from pacer pocket today. Will place wound vac. Wound dimensions 2.5 cm deep, 5 cm long, 3 cm wide. No tunneling or undermining, minimal slough.          Data:    All labs and imaging reviewed by me.  Labs:    Data   Recent Labs   Lab 10/02/20  0957 10/02/20  0537 10/01/20  0603 09/30/20  0540   WBC  --  1.1* 1.9* 1.9*   HGB 7.8* 6.9* 7.8* 7.0*   MCV  --  101* 100 100   PLT  --  210 233 182   INR  --  1.12 1.10 1.16*   NA  --  138 138 140   POTASSIUM  --  4.8 5.1 5.1   CHLORIDE  --  107 107 110*   CO2  --  26 24 25   BUN  --  30 27 30   CR  --  1.49* 1.51* 1.47*   ANIONGAP  --  5 7 5   BRYANNA  --  8.4* 8.5 8.4*   GLC  --  170* 233* 162*     Recent Labs   Lab 10/02/20  0827 10/02/20  0537 10/02/20  0202 10/01/20  2142 10/01/20  1257 10/01/20  0858 10/01/20  0603 10/01/20  0601 09/30/20  0540 09/30/20  0540 09/29/20  0523 09/29/20  0523 09/28/20  0513 09/28/20  0513 09/27/20  0428 09/27/20  0428   GLC  --  170*  --   --   --   --  233*  --   --  162*  --  279*  --  161*  --  168*   *  --  136* 157* 185* 245*  --  231*   < >  --    < >  --    < >  --    < >  --     < > = values in this interval not displayed.        Imaging:  No results found for this or any previous visit (from the past 24 hour(s)).

## 2020-10-02 NOTE — PLAN OF CARE
"EDEMA 6C: Pt sitting upright in recliner upon arrival, agreeable to therapy session. Pt with compression stockings donned, performed skin check and provided skin cares/applied Sween 24, skin intact with purple bruising present, 2+ pitting edema in BLEs, 3+ pitting in feet. Th applied gradient compression bandaging technique from MTPs to knee crease using \"quick wrap technique\" to reduce swelling and promote functional use of lower extremities necessary for safe mobility and ADL completion. Th applied foam on feet to reduce 3+ pitting edema in feet. Pt reports comfort with wraps, educated to remove if wraps cause numbness/tingling or become soiled.  "

## 2020-10-02 NOTE — PLAN OF CARE
D-s/p heart transplant for ICM in July 2020. Admitted for draining wounds on 09/22/20 (chest and abdomen). Now s/p I&D.  I-Dressings changed per orders. Continues on cipro.  A-Have seen wounds for the last 2 days, appear to be improving.  P-Continue with current poc.

## 2020-10-03 ENCOUNTER — APPOINTMENT (OUTPATIENT)
Dept: OCCUPATIONAL THERAPY | Facility: CLINIC | Age: 65
DRG: 857 | End: 2020-10-03
Payer: COMMERCIAL

## 2020-10-03 LAB
ALBUMIN SERPL-MCNC: 2.2 G/DL (ref 3.4–5)
ANION GAP SERPL CALCULATED.3IONS-SCNC: 4 MMOL/L (ref 3–14)
ANISOCYTOSIS BLD QL SMEAR: SLIGHT
BASOPHILS # BLD AUTO: 0 10E9/L (ref 0–0.2)
BASOPHILS NFR BLD AUTO: 0 %
BUN SERPL-MCNC: 32 MG/DL (ref 7–30)
CALCIUM SERPL-MCNC: 8.4 MG/DL (ref 8.5–10.1)
CHLORIDE SERPL-SCNC: 106 MMOL/L (ref 94–109)
CO2 SERPL-SCNC: 26 MMOL/L (ref 20–32)
CREAT SERPL-MCNC: 1.36 MG/DL (ref 0.66–1.25)
DACRYOCYTES BLD QL SMEAR: SLIGHT
DIFFERENTIAL METHOD BLD: ABNORMAL
EOSINOPHIL # BLD AUTO: 0 10E9/L (ref 0–0.7)
EOSINOPHIL NFR BLD AUTO: 0.9 %
ERYTHROCYTE [DISTWIDTH] IN BLOOD BY AUTOMATED COUNT: 20.7 % (ref 10–15)
GFR SERPL CREATININE-BSD FRML MDRD: 53 ML/MIN/{1.73_M2}
GLUCOSE BLDC GLUCOMTR-MCNC: 203 MG/DL (ref 70–99)
GLUCOSE BLDC GLUCOMTR-MCNC: 209 MG/DL (ref 70–99)
GLUCOSE BLDC GLUCOMTR-MCNC: 290 MG/DL (ref 70–99)
GLUCOSE BLDC GLUCOMTR-MCNC: 360 MG/DL (ref 70–99)
GLUCOSE BLDC GLUCOMTR-MCNC: 375 MG/DL (ref 70–99)
GLUCOSE SERPL-MCNC: 206 MG/DL (ref 70–99)
HCT VFR BLD AUTO: 23.5 % (ref 40–53)
HGB BLD-MCNC: 7.1 G/DL (ref 13.3–17.7)
INR PPP: 1.21 (ref 0.86–1.14)
LYMPHOCYTES # BLD AUTO: 0.1 10E9/L (ref 0.8–5.3)
LYMPHOCYTES NFR BLD AUTO: 12.6 %
MAGNESIUM SERPL-MCNC: 1.9 MG/DL (ref 1.6–2.3)
MCH RBC QN AUTO: 30.3 PG (ref 26.5–33)
MCHC RBC AUTO-ENTMCNC: 30.2 G/DL (ref 31.5–36.5)
MCV RBC AUTO: 100 FL (ref 78–100)
MICROCYTES BLD QL SMEAR: PRESENT
MONOCYTES # BLD AUTO: 0.1 10E9/L (ref 0–1.3)
MONOCYTES NFR BLD AUTO: 11.7 %
MYELOCYTES # BLD: 0 10E9/L
MYELOCYTES NFR BLD MANUAL: 0.9 %
NEUTROPHILS # BLD AUTO: 0.7 10E9/L (ref 1.6–8.3)
NEUTROPHILS NFR BLD AUTO: 73.9 %
NRBC # BLD AUTO: 0 10*3/UL
NRBC BLD AUTO-RTO: 3 /100
OVALOCYTES BLD QL SMEAR: ABNORMAL
PLATELET # BLD AUTO: 224 10E9/L (ref 150–450)
PLATELET # BLD EST: ABNORMAL 10*3/UL
POIKILOCYTOSIS BLD QL SMEAR: ABNORMAL
POLYCHROMASIA BLD QL SMEAR: SLIGHT
POTASSIUM SERPL-SCNC: 4.9 MMOL/L (ref 3.4–5.3)
RBC # BLD AUTO: 2.34 10E12/L (ref 4.4–5.9)
RBC INCLUSIONS BLD: ABNORMAL
SODIUM SERPL-SCNC: 136 MMOL/L (ref 133–144)
TACROLIMUS BLD-MCNC: 13.7 UG/L (ref 5–15)
TME LAST DOSE: NORMAL H
WBC # BLD AUTO: 1 10E9/L (ref 4–11)

## 2020-10-03 PROCEDURE — 85025 COMPLETE CBC W/AUTO DIFF WBC: CPT | Performed by: STUDENT IN AN ORGANIZED HEALTH CARE EDUCATION/TRAINING PROGRAM

## 2020-10-03 PROCEDURE — 214N000001 HC R&B CCU UMMC

## 2020-10-03 PROCEDURE — 96372 THER/PROPH/DIAG INJ SC/IM: CPT | Performed by: PHYSICIAN ASSISTANT

## 2020-10-03 PROCEDURE — 250N000011 HC RX IP 250 OP 636: Performed by: STUDENT IN AN ORGANIZED HEALTH CARE EDUCATION/TRAINING PROGRAM

## 2020-10-03 PROCEDURE — 96372 THER/PROPH/DIAG INJ SC/IM: CPT | Performed by: STUDENT IN AN ORGANIZED HEALTH CARE EDUCATION/TRAINING PROGRAM

## 2020-10-03 PROCEDURE — 250N000013 HC RX MED GY IP 250 OP 250 PS 637: Performed by: NURSE PRACTITIONER

## 2020-10-03 PROCEDURE — 97140 MANUAL THERAPY 1/> REGIONS: CPT | Mod: GO

## 2020-10-03 PROCEDURE — 36415 COLL VENOUS BLD VENIPUNCTURE: CPT | Performed by: STUDENT IN AN ORGANIZED HEALTH CARE EDUCATION/TRAINING PROGRAM

## 2020-10-03 PROCEDURE — 250N000013 HC RX MED GY IP 250 OP 250 PS 637: Performed by: STUDENT IN AN ORGANIZED HEALTH CARE EDUCATION/TRAINING PROGRAM

## 2020-10-03 PROCEDURE — 250N000012 HC RX MED GY IP 250 OP 636 PS 637: Performed by: PHYSICIAN ASSISTANT

## 2020-10-03 PROCEDURE — 250N000013 HC RX MED GY IP 250 OP 250 PS 637: Performed by: INTERNAL MEDICINE

## 2020-10-03 PROCEDURE — 96372 THER/PROPH/DIAG INJ SC/IM: CPT | Performed by: INTERNAL MEDICINE

## 2020-10-03 PROCEDURE — 83735 ASSAY OF MAGNESIUM: CPT | Performed by: STUDENT IN AN ORGANIZED HEALTH CARE EDUCATION/TRAINING PROGRAM

## 2020-10-03 PROCEDURE — 99232 SBSQ HOSP IP/OBS MODERATE 35: CPT | Performed by: PHYSICIAN ASSISTANT

## 2020-10-03 PROCEDURE — 36415 COLL VENOUS BLD VENIPUNCTURE: CPT | Performed by: NURSE PRACTITIONER

## 2020-10-03 PROCEDURE — 84134 ASSAY OF PREALBUMIN: CPT | Performed by: STUDENT IN AN ORGANIZED HEALTH CARE EDUCATION/TRAINING PROGRAM

## 2020-10-03 PROCEDURE — 250N000012 HC RX MED GY IP 250 OP 636 PS 637: Performed by: STUDENT IN AN ORGANIZED HEALTH CARE EDUCATION/TRAINING PROGRAM

## 2020-10-03 PROCEDURE — 82040 ASSAY OF SERUM ALBUMIN: CPT | Performed by: STUDENT IN AN ORGANIZED HEALTH CARE EDUCATION/TRAINING PROGRAM

## 2020-10-03 PROCEDURE — 250N000011 HC RX IP 250 OP 636: Performed by: PHYSICIAN ASSISTANT

## 2020-10-03 PROCEDURE — 250N000012 HC RX MED GY IP 250 OP 636 PS 637: Performed by: INTERNAL MEDICINE

## 2020-10-03 PROCEDURE — 85027 COMPLETE CBC AUTOMATED: CPT | Performed by: STUDENT IN AN ORGANIZED HEALTH CARE EDUCATION/TRAINING PROGRAM

## 2020-10-03 PROCEDURE — 80197 ASSAY OF TACROLIMUS: CPT | Performed by: NURSE PRACTITIONER

## 2020-10-03 PROCEDURE — 85610 PROTHROMBIN TIME: CPT | Performed by: STUDENT IN AN ORGANIZED HEALTH CARE EDUCATION/TRAINING PROGRAM

## 2020-10-03 PROCEDURE — 80048 BASIC METABOLIC PNL TOTAL CA: CPT | Performed by: STUDENT IN AN ORGANIZED HEALTH CARE EDUCATION/TRAINING PROGRAM

## 2020-10-03 PROCEDURE — 999N001017 HC STATISTIC GLUCOSE BY METER IP

## 2020-10-03 PROCEDURE — 99232 SBSQ HOSP IP/OBS MODERATE 35: CPT | Mod: GC | Performed by: INTERNAL MEDICINE

## 2020-10-03 RX ORDER — SODIUM HYPOCHLORITE 5 MG/ML
SOLUTION TOPICAL 2 TIMES DAILY
Status: DISCONTINUED | OUTPATIENT
Start: 2020-10-03 | End: 2020-10-05 | Stop reason: HOSPADM

## 2020-10-03 RX ADMIN — INSULIN HUMAN 35 UNITS: 100 INJECTION, SUSPENSION SUBCUTANEOUS at 07:53

## 2020-10-03 RX ADMIN — HYDRALAZINE HYDROCHLORIDE 100 MG: 100 TABLET, FILM COATED ORAL at 06:24

## 2020-10-03 RX ADMIN — CLOTRIMAZOLE 1 LOZENGE: 10 LOZENGE ORAL at 13:41

## 2020-10-03 RX ADMIN — PREDNISONE 5 MG: 5 TABLET ORAL at 19:53

## 2020-10-03 RX ADMIN — ASPIRIN 81 MG: 81 TABLET, COATED ORAL at 07:52

## 2020-10-03 RX ADMIN — TACROLIMUS 2.5 MG: 1 CAPSULE ORAL at 17:55

## 2020-10-03 RX ADMIN — TAMSULOSIN HYDROCHLORIDE 0.4 MG: 0.4 CAPSULE ORAL at 07:52

## 2020-10-03 RX ADMIN — Medication 1 TABLET: at 07:52

## 2020-10-03 RX ADMIN — MAGNESIUM OXIDE 400 MG: 400 TABLET ORAL at 07:52

## 2020-10-03 RX ADMIN — PREDNISONE 5 MG: 5 TABLET ORAL at 07:52

## 2020-10-03 RX ADMIN — CIPROFLOXACIN HYDROCHLORIDE 500 MG: 500 TABLET, FILM COATED ORAL at 07:52

## 2020-10-03 RX ADMIN — ROSUVASTATIN CALCIUM 10 MG: 10 TABLET, FILM COATED ORAL at 07:52

## 2020-10-03 RX ADMIN — CIPROFLOXACIN HYDROCHLORIDE 500 MG: 500 TABLET, FILM COATED ORAL at 19:53

## 2020-10-03 RX ADMIN — CLOTRIMAZOLE 1 LOZENGE: 10 LOZENGE ORAL at 07:52

## 2020-10-03 RX ADMIN — Medication: at 09:22

## 2020-10-03 RX ADMIN — FILGRASTIM 120 MCG: 300 INJECTION, SOLUTION INTRAVENOUS; SUBCUTANEOUS at 17:17

## 2020-10-03 RX ADMIN — HYDRALAZINE HYDROCHLORIDE 100 MG: 100 TABLET, FILM COATED ORAL at 14:22

## 2020-10-03 RX ADMIN — PANTOPRAZOLE SODIUM 40 MG: 40 TABLET, DELAYED RELEASE ORAL at 07:52

## 2020-10-03 RX ADMIN — SULFAMETHOXAZOLE AND TRIMETHOPRIM 1 TABLET: 400; 80 TABLET ORAL at 07:52

## 2020-10-03 RX ADMIN — FONDAPARINUX SODIUM 7.5 MG: 7.5 INJECTION, SOLUTION SUBCUTANEOUS at 19:59

## 2020-10-03 RX ADMIN — MAGNESIUM OXIDE 400 MG: 400 TABLET ORAL at 19:53

## 2020-10-03 RX ADMIN — SODIUM HYPOCHLORITE: 5 SOLUTION TOPICAL at 20:03

## 2020-10-03 RX ADMIN — CLOTRIMAZOLE 1 LOZENGE: 10 LOZENGE ORAL at 19:53

## 2020-10-03 RX ADMIN — CLOTRIMAZOLE 1 LOZENGE: 10 LOZENGE ORAL at 17:55

## 2020-10-03 RX ADMIN — HYDRALAZINE HYDROCHLORIDE 100 MG: 100 TABLET, FILM COATED ORAL at 22:02

## 2020-10-03 RX ADMIN — TACROLIMUS 3 MG: 1 CAPSULE ORAL at 07:52

## 2020-10-03 RX ADMIN — Medication 1 TABLET: at 17:55

## 2020-10-03 ASSESSMENT — ACTIVITIES OF DAILY LIVING (ADL)
ADLS_ACUITY_SCORE: 15
ADLS_ACUITY_SCORE: 14

## 2020-10-03 NOTE — PROGRESS NOTES
Care Management Follow Up Note    Length of Stay (days) 11    Patient plan of care discussed at Interdisciplinary Rounds: N/A  Expected Discharge Date: 10/03/20e  Concerns to be Addressed: wound vac approval and delivery         Anticipated Discharge Disposition:  Home with wound vac  Anticipated Discharge Services:  Wound vac, Skilled nursing visits, home Physical Therapy and Outpatient therapy  Anticipated Discharge DME:  Wound vac    Plan:  Verified on [employer], that wound vac has been approved. Spoke with bedside RN and it does not appear that patient will be discharging today due to low WBC. Nurse aware that when patient is able to discharge, home wound vac can be delivered from [facility].     Care Coordination will continue to follow for further needs.     Alicia Epstein RN   Care Coordinator

## 2020-10-03 NOTE — PROGRESS NOTES
Diabetes Consult Daily  Progress Note          Assessment/Plan:    Lucian Henderson Sr is a 64 year old male with uncontrolled type 2 diabetes, hypertension, hyperlipidemia, CKD stage 3, hx of RIJ clot and remains on fondaparinux due to HIT hx, ICM s/p OHT 7/19/2020, obesity admitted on (9/22/2020) for left infraclavicular AICD pocket infection s/p I&D of left infraclavicular wound and midline chest tube wound  on (9/29/2020) by Dr. Tong      Type 2 diabetes: uncontrolled with steroid induced hyperglycemia  Prednisone 5 mg am and 5 mg PM. Currently being treated for leukopenia and infection.     Plan(orders put in for you)  - Increase to NPH 40 units in the morning (0900)  - Increase to NPH 8 units at ( 2100)   -Increase Novolog CHO coverage to 1 unit per 4 grams of  CHO for meals/snacks/supplements  - Continue Novolog high correction scale before meals and at bedtime  - monitor glucose before meals, HS and 0200  -hypoglycemia protocol     If planned for discharge, tentatively recommend  -NPH 40 units am, 8 units pm.  -Novolog 10 units breakfast, 14 units lunch and 10 units dinner.   -Follow up with Yoly Prieto in 1 week, provide patient with phone number [phone] to reach endocrine fellow yaritza if experienced any issue with scheduling or hypoglycemia.             Interval History:   The last 24 hours progress and nursing notes reviewed.    Declined    Yesterday had hyperglycemia starting from morning, persisted until evening. He said did not take any snacks or unconvered food in between meals.   Creatinine has improved from 1.49--->1.36, perhaps improved renal clearance raised his insulin requirement.   Appetite is reported as good, denies n/v    PTA:  NPH 35 units am  The fixed meal was added to the sliding scale. ( as listed below)  Para -169 give 17 units antes de comer.  -199 give 19 units.  -229 give 21 units.  -259 give 23  "units.  -289 give 25 units.  -319 give 27 units.  -349 give 29 units.  BG >/= 350 give 31 units.      Recent Labs   Lab 10/03/20  0743 10/03/20  0519 10/03/20  0319 10/02/20  2148 10/02/20  1716 10/02/20  1313 10/02/20  0827 10/02/20  0537 10/01/20  0603 10/01/20  0603 09/30/20  0540 09/30/20  0540 09/29/20  0523 09/29/20  0523 09/28/20  0513 09/28/20  0513   GLC  --  206*  --   --   --   --   --  170*  --  233*  --  162*  --  279*  --  161*   *  --  203* 252* 376* 274* 164*  --    < >  --    < >  --    < >  --    < >  --     < > = values in this interval not displayed.               Review of Systems:   See interval hx          Medications:     Orders Placed This Encounter      Consistent Carbohydrate Diet 8971-5725 Calories: Moderate Consistent CHO (4-6 CHO units/meal)         Physical:   Vital signs:  Temp: 98.2  F (36.8  C) Temp src: Oral BP: 128/72 Pulse: 86   Resp: 16 SpO2: 99 % O2 Device: None (Room air) Oxygen Delivery: 2 LPM   Weight: 79.7 kg (175 lb 12.8 oz)  Estimated body mass index is 29.25 kg/m  as calculated from the following:    Height as of 8/24/20: 1.651 m (5' 5\").    Weight as of this encounter: 79.7 kg (175 lb 12.8 oz).  General:  Healthy, alert and oriented X3, NAD, answering questions appropriately.  Pulmonary: No audible wheeze or cough. Able to speak fully in complete sentences.   Neurological: Alert. Mentation intact and speech normal.  Psychological: mentation appears normal, affect normal/bright, judgement and insight intact, normal speech  Physical exam is limited due to Phone visit related to COVID-19              Data:     Lab Results   Component Value Date    A1C 8.2 07/03/2020    A1C 7.9 07/08/2019    A1C 8.5 03/11/2019    A1C 7.6 10/16/2018    A1C 9.8 09/06/2017            Preet Cook   Endocrinology Fellow PGY-5  Discussed with staff endocrinologist Dr Nagy    ATTENDING NOTE  I have spoken to the patient, reviewed and edited the fellow's note, and " agree with the plan of care.    Omaira Nagy MD PhD    Division of Endocrinology and Diabetes

## 2020-10-03 NOTE — PLAN OF CARE
"OT/Edema 6C: Patient tolerating wear of gradient compression bandaging to bilateral lower extremities well, according to set x24 hour schedule. 3+ soft pitting edema still present from MTP to knee creases (greater on left side) with near absent edema proximally towards thigh and groin. GCB remains appropriate to assist with fluid movement and wraps donned using \"Quick Wrap Technique\" which may be worn x48 hours. Please remove wraps if increased pain, numbness/tingling or soiling occurs.  "

## 2020-10-03 NOTE — PLAN OF CARE
D: admitted 9/22 for I&D of ICD pocket and old chest tube site. Wound vac placed on L chest 10/02.   Hx: ICM s/p OHT (7/2020) c/b donor strep salvarius bacteremia, CAD s/p CABG (2008), DM2, HIT, HTN, RUE DVT, and urinary retention.      I: Monitored vitals and assessed pt status.      A: A0x4. VSS on RA. Tele shows SR, rate 80-90s. Afebrile. Urinating adequately. Pt denied pain or SOB. Wound vac tp `125 suction, dressing CDI, no output overnight. Up with SBA/Independent in room. Slept between cares.      P: Continue to monitor Pt status and report changes to CVTS.     [id number]  Jyotsna Chandler RN on 10/3/2020 at 6:53 AM

## 2020-10-03 NOTE — PROGRESS NOTES
[facility]   Cardiology II Service / Advanced Heart Failure  Daily Progress Note      Patient: Lucian Henderson Sr.  MRN: [id number]  Admission Date: 9/22/2020  Hospital Day # 11    Assessment and Plan:  Mr. Tee Henderson is a 64 year old male w/ICM now s/p OHT 7/20/2020 c/b donor strep salvarius bacteremia, CAD s/p CABG in 2008, DM Type II, HIT, RUE DVT, urinary retention who presents to clinic today for routine heart transplant follow-up. He presents for Left subclavian surgical wound hematoma with infection and abdominal wound infection.     Recommendations today:  - Will give 20 mg lasix in the AM  - Will give Neupogen 120 mcg x1, f/u CBC in the AM   - Discontinued Valcyte 10/2  - CMV pending   - Decrease Tac to 3 mg in the morning and 2.5 mg in the afternoon.      Left ICD pocket hematoma complicated by Pseudomonas infection. Blood culture positive for Cutibacterium, contaminant per ID. S/p left surgical wound debridement at bedside per CVTS, culture positive for Pseudomonas. Abdominal wound culture positive for strep mitis and Pseudomonas.   - Appreciate ID input.   - Continue Cipro 500 mg po BID  - If persistent bleeding at left subclavian site consider hematology consult for recommendations on Fondaparinux.   - Wound vac per CVTS.      Status post Heart Transplantation on 7/20/20 due to ICM. Primary graft dysfunction, resolved.  Echo 9/23 with EF 55-60%, normal RV function, and no effusion. RHC 9/28 with mRA-8, RV-44/10, mPA-30, mPCWP-20, LIO CI-2.84 and LIO CO-6.34, biopsy negative.   Immunosuppression: Tac 3/3.5 with level 11.4 (outlier without change) repeat level in AM. (goal 8-10 in setting of infection). Mycophenolate on hold (prior dose 750 mg po BID). Prednisone 5 mg po BID.   Serostatus: CMV: D+/R+, EBV: D+/R+, Toxo: D+/R-  Prophylaxis: Continue Bactrim given D+ Toxo and Nystatin. Discontinue Valcyte, CMV DNA Quant in AM.   - Continue ASA and Crestor.  - Next RCH/Biopsy/Echo  due 10/12.  - Defer Flu vaccine at this time given minimal availability to mount appropriate immune response.      Leukopenia and Anemia. Prior Hematology consult 8/25 notes anemia multifactorial secondary to acute illness, inflammation, drug-related (new immunosuppressants particularly the MMF, Bactrim) and renal dysfunction. It was recommended by hematology at that time to continue fondaparinux for recent line associated RIJ DVT in the setting of HIT. Total duration of anticoagulation will be 3 months.   - WBC-1.1 with ANC-0.7.  - Hgb 7.1.   - Continue Bactrim given D+ Toxo.   - Discontinue Valcyte, CMV DNA Quant in AM.   - Neupogen as above  - Neutropenic precautions.      LE edema. Filling pressures stable per RHC 9/28.  - Encouraged protein intake, TEDS, and activity.   - Lymphedema consult.      HTN.   - Continue Hydralazine to 100 mg po TID.       RIJ and RUE DVT, provoked. US 7/22/20 consistent with nonocclusive thrombus in the right internal jugular vein along the intravenous catheter demonstrated, nonocclusive thrombus along the right PICC line in the right axillary vein demonstrated, and occlusive thrombus in the superficial right cephalic vein demonstrated as above. Follow up US negative 9/24.  - Given HIT history continue Fondaparinux for 3 months. If persistent bleeding at left subclavian site consider hematology consult for recommendations on Fondaparinux.      History of Donor Streptococcus salivarius bacteremia  - Transplant ID consult 7/21, treated with ceftriaxone. Course complete.  ================================================================    Interval History/ROS: He is feeling well. No new complaints. He denies fever, chills, chest pain, ALBRIGHT, cough, oral lesions, SOB, nausea, vomiting, and diarrhea. He denies pain or increased drainage to wounds. He notes improvement to LE edema. He is tolerating oral intake and ambulation.     Last 24 hr care team notes reviewed.   ROS:  4 point ROS  including Respiratory, CV, GI and , other than that noted in the HPI, is negative.     Medications: Reviewed in EPIC.     Physical Exam:   /73 (BP Location: Right arm)   Pulse 82   Temp 98.2  F (36.8  C) (Oral)   Resp 18   Wt 79.7 kg (175 lb 12.8 oz)   SpO2 98%   BMI 29.25 kg/m    GENERAL: Appears alert and interacting appropriately.   HEENT: Eye symmetrical and free of discharge bilaterally. Mucous membranes moist and without lesions.  NECK: Supple and without lymphadenopathy. JVD at clavicular line upright.   CV: RRR, S1S2 present without murmur, rub, or gallop.   RESPIRATORY: Respirations regular, even, and unlabored. Lungs CTA throughout.   GI: Soft and non distended with normoactive bowel sounds present in all quadrants. No tenderness, rebound, guarding. No organomegaly.   EXTREMITIES: +2 bilateral LE peripheral edema. 2+ bilateral pedal pulses.   NEUROLOGIC: Alert and interacting appropriately. No focal deficits.   MUSCULOSKELETAL: No joint swelling or tenderness.   SKIN: No jaundice. No rashes or lesions. Left chest and abdomen covered.     Data:  CMP  Recent Labs   Lab 10/03/20  0519 10/02/20  0537 10/01/20  0603 09/30/20  0540    138 138 140   POTASSIUM 4.9 4.8 5.1 5.1   CHLORIDE 106 107 107 110*   CO2 26 26 24 25   ANIONGAP 4 5 7 5   * 170* 233* 162*   BUN 32* 30 27 30   CR 1.36* 1.49* 1.51* 1.47*   GFRESTIMATED 53* 48* 47* 49*   GFRESTBLACK 62 56* 55* 56*   BRYANNA 8.4* 8.4* 8.5 8.4*   MAG 1.9 1.9 1.9 1.8   ALBUMIN 2.2*  --   --   --      CBC  Recent Labs   Lab 10/03/20  0519 10/02/20  0957 10/02/20  0537 10/01/20  0603 09/30/20  0540   WBC 1.0*  --  1.1* 1.9* 1.9*   RBC 2.34*  --  2.34* 2.57* 2.27*   HGB 7.1* 7.8* 6.9* 7.8* 7.0*   HCT 23.5*  --  23.7* 25.8* 22.7*     --  101* 100 100   MCH 30.3  --  29.5 30.4 30.8   MCHC 30.2*  --  29.1* 30.2* 30.8*   RDW 20.7*  --  21.2* 21.1* 20.3*     --  210 233 182     INR  Recent Labs   Lab 10/03/20  0519 10/02/20  0537  10/01/20  0603 09/30/20  0540   INR 1.21* 1.12 1.10 1.16*       Patient discussed with Dr. Malhotra.    Maria Elena Cazares PA-C  [facility] Cardiology

## 2020-10-03 NOTE — PROGRESS NOTES
Cardiovascular Surgery Progress Note  10/03/2020           Assessment and Plan:   Lucian Henderson Sr. is a 64 year old male with a PMH of end-stage ischemic cardiomyopathy, CAD s/p CABG 2008, DMT2, who was admitted to [facility] 07/03 for heart failure exacerbation with cardiogenic shock, underwent right subclavian IABP insertion 07/16 with Dr. Sparrow, and then heart transplant 07/20 with Dr. Griselli. 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 Now on Fondaparinux. Readmitted to [facility] 09/22 with hyperglycemia, CV surgery consulted for left former ICD pocket infection and former chest tube site infection. Underwent I&D in OR 09/23 with Dr. Huertas.     Cardiovascular:   S/p Heart transplant (7/20)  - Immunosuppression/ppx per cardiology, appreciate assistance  - Hydralazine 75 mg q8h for hypertension     Pulmonary:  Encourage IS, C&DB, ambulating  Saturating well on RA    Neuro/MSK:  Neuro intact  Acute post-operative pain - pain well controlled with IV dilaudid, PO oxycodone    /Renal:  DAYA on CKD, non-oliguric  Cr today 1.36, stable, baseline unclear. Is making urine. Low suspicion for obstruction. I/O and BUN/Cr ratio (~22) suggestive of pre-renal dehydration.   - encourage oral intake.  - Daily BMP    GI/FEN:   Consistent carb diet, continue bowel regimen, + BM  Replace lytes as needed    Endo:  Streroid induced hyperglycemia  Type II Diabetes Mellitus   - Endocrine consulted, appreciate assistance    Heme:   Acute blood loss anemia  - Hgb 7.1. Plt WNL, no signs or symptoms of bleeding  - Hold-off on transfusions for now given risk of rejection.   Hx of HIT/DVT  - PTA fondaparinus 7.5 mg daily    ID:  Left subclavian surgical wound infection  Abdominal wound surgical wound infection  Leukopenia, moderate neutropenia, medications induced  Blood culture (09/22/2020): Gram positive rods on 4th day of incubation; per ID, likely a contaminant. Noted to be bleeding from left  subclavian wound, per CVTS, large hematoma noted, debrided at bedside. Abdominal wound culture 09/23 growing strep mitis and pseudomonas. Left chest former ICD pocket culture 09/23 growing pseudomonas. CRP trending down, currently on ciprofloxacin 500 mg PO BID (End date 10/12). S/p repeat I&D on 9/28/20. Wound appears to be healing without signs of infection. Continues to have bleeding that is controlled with silver nitrate  - Continue TID dressing changes with dakins solution of both abdominal and left ICD pocket wounds  - Silver nitrate and quick clot prn for bleeding  - Discussed IV antibiotic with transplant ID. Since suseptibible to cipro and cipro has good bioavailability, would not recommend double coverage from pseudomonas and they do not think IV is necessary.  - Wound vac holding nicely placed 10/2, wound dimensions 10/2: 2.5 cm deep, 5 cm long, 3 cm wide. No tunneling or undermining, minimal slough.   - Patient very neutropenic, ID recommending stopping Valcyte, weekly CMV, and one time dose of Neupogen. Cardiology to decide on dose today. Patient at increased risk of rejection with Neupogen.     Prophylaxis:   GI ppx: protonix  DVT prophylaxis: Fonduparinox, PCD    Dispo:   - possible discharge tomorrow  - Wound vac ordered and waiting for arrival.   - Inpatient for treatment of neutropenia per Cardiology.     Staff surgeons have been informed of changes through both verbal and written communication.       Claudia Chapman PA-C  Cardiothoracic Surgery  Pager [phone]  October 3, 2020          Interval History:   No acute events overnight. States pain is well managed on current regimen, Breathing is okay. Tolerating diet, is passing flatus, + BM. Denies chest pain, palpitations, dizziness, syncopal symptoms, chills, myalgias or sternal popping/clicking. Feeling more tired today. Disappointed he is not going home.          Medications:       aspirin  81 mg Oral Daily     calcium carbonate 600  mg-vitamin D 400 units  1 tablet Oral BID w/meals     ciprofloxacin  500 mg Oral Q12H MUSA     clotrimazole  1 lozenge Buccal 4x Daily     fondaparinux ANTICOAGULANT  7.5 mg Subcutaneous Q24H     hydrALAZINE  100 mg Oral Q8H     insulin aspart   Subcutaneous TID w/meals     insulin aspart  1-10 Units Subcutaneous TID AC     insulin aspart  1-7 Units Subcutaneous At Bedtime     insulin isophane human  35 Units Subcutaneous QAM     insulin isophane human  5 Units Subcutaneous Q24H     magnesium oxide  400 mg Oral BID     pantoprazole  40 mg Oral QAM AC     predniSONE  5 mg Oral QAM AC     predniSONE  5 mg Oral QPM     rosuvastatin  10 mg Oral Daily     senna-docusate  1 tablet Oral BID    Or     senna-docusate  2 tablet Oral BID     sodium chloride (PF)  3 mL Intracatheter Q8H     sodium hypochlorite   Topical BID     sulfamethoxazole-trimethoprim  1 tablet Oral Daily     tacrolimus  3 mg Oral QAM     tacrolimus  3.5 mg Oral QPM     tamsulosin  0.4 mg Oral Daily     acetaminophen, - MEDICATION INSTRUCTIONS -, glucose **OR** dextrose **OR** glucagon, HOLD MEDICATION, HYDROmorphone, insulin aspart, lidocaine 4%, lidocaine (buffered or not buffered), magnesium sulfate, magnesium sulfate, naloxone, ondansetron **OR** ondansetron, oxyCODONE IR, - MEDICATION INSTRUCTIONS -, polyethylene glycol, potassium chloride, potassium chloride with lidocaine, potassium chloride, potassium chloride, potassium chloride, ACE/ARB/ARNI NOT PRESCRIBED, senna-docusate, silver nitrate, sodium chloride (PF)          Physical Exam:   Vitals were reviewed  Blood pressure (!) 140/82, pulse 77, temperature 97.6  F (36.4  C), temperature source Oral, resp. rate 16, weight 79.7 kg (175 lb 12.8 oz), SpO2 98 %.  Vitals:    10/01/20 0601 10/02/20 0157 10/03/20 0323   Weight: 81.3 kg (179 lb 4.8 oz) 81.7 kg (180 lb 1.6 oz) 79.7 kg (175 lb 12.8 oz)      I/O last 3 completed shifts:  In: 480 [P.O.:480]  Out: 1100 [Urine:1100]    Gen: A&Ox4, NAD  CV: RRR,  normal S1, S2, no murmurs, rubs or gallops   Pulm: Lungs diminished in bases, otherwise CTA, no wheezing or rhonchi  Abd: Soft, NT, ND, +BS  Ext: 2+ bilateral LE edema, legs wrapped   Neuro: Nonfocal  Wounds: wound vac in place and functioning well.          Data:    All labs and imaging reviewed by me.  Labs:    Data   Recent Labs   Lab 10/03/20  0519 10/02/20  0957 10/02/20  0537 10/01/20  0603   WBC 1.0*  --  1.1* 1.9*   HGB 7.1* 7.8* 6.9* 7.8*     --  101* 100     --  210 233   INR 1.21*  --  1.12 1.10     --  138 138   POTASSIUM 4.9  --  4.8 5.1   CHLORIDE 106  --  107 107   CO2 26  --  26 24   BUN 32*  --  30 27   CR 1.36*  --  1.49* 1.51*   ANIONGAP 4  --  5 7   BRYANNA 8.4*  --  8.4* 8.5   *  --  170* 233*   ALBUMIN 2.2*  --   --   --      Recent Labs   Lab 10/03/20  0743 10/03/20  0519 10/03/20  0319 10/02/20  2148 10/02/20  1716 10/02/20  1313 10/02/20  0827 10/02/20  0537 10/01/20  0603 10/01/20  0603 09/30/20  0540 09/30/20  0540 09/29/20  0523 09/29/20  0523 09/28/20  0513 09/28/20  0513   GLC  --  206*  --   --   --   --   --  170*  --  233*  --  162*  --  279*  --  161*   *  --  203* 252* 376* 274* 164*  --    < >  --    < >  --    < >  --    < >  --     < > = values in this interval not displayed.        Imaging:  No results found for this or any previous visit (from the past 24 hour(s)).

## 2020-10-03 NOTE — PLAN OF CARE
Pt w/ICM now s/p OHT 7/20/2020 c/b donor strep salvarius bacteremia, CAD s/p CABG in 2008, DM Type II, HIT, RUE DVT, urinary retention who presents to clinic today for routine heart transplant follow-up. He presents for Left subclavian surgical wound hematoma with infection and abdominal wound infection. Wound vac applied to L chest, no output. Abdominal wound dressed on day shift, CDI. LE edema, lymphedema wraps on. Also has some hand swelling. Elevated blood sugars covered with sliding scale insulin. On oral abx. Neutropenic precautions. A&O, independent in the room.SR 70s-90s.

## 2020-10-03 NOTE — PLAN OF CARE
WBC down to 1.0; so this afternoon, Cards 2 team ordered dose of subcutaneous Neupogen.  L chest wound VAC drsg C/D/I with no measureable drainage. Abdominal wound healing well.   Endocrine adjusting insulin needs as BGs running high.   Encouraged pt to increase his activity as able & pt and wife walked twice in halls. Up in recliner rest of time. LE edema persists. Lymph wraps replaced today.   Pleasant & cooperative but frustrated that he cannot go home until WBC stable.

## 2020-10-04 ENCOUNTER — APPOINTMENT (OUTPATIENT)
Dept: OCCUPATIONAL THERAPY | Facility: CLINIC | Age: 65
DRG: 857 | End: 2020-10-04
Payer: COMMERCIAL

## 2020-10-04 LAB
ANION GAP SERPL CALCULATED.3IONS-SCNC: 5 MMOL/L (ref 3–14)
ANISOCYTOSIS BLD QL SMEAR: ABNORMAL
BASOPHILS # BLD AUTO: 0 10E9/L (ref 0–0.2)
BASOPHILS NFR BLD AUTO: 0.9 %
BUN SERPL-MCNC: 31 MG/DL (ref 7–30)
CALCIUM SERPL-MCNC: 8.4 MG/DL (ref 8.5–10.1)
CHLORIDE SERPL-SCNC: 107 MMOL/L (ref 94–109)
CMV DNA SPEC NAA+PROBE-ACNC: NORMAL [IU]/ML
CMV DNA SPEC NAA+PROBE-LOG#: NORMAL {LOG_IU}/ML
CO2 SERPL-SCNC: 25 MMOL/L (ref 20–32)
CREAT SERPL-MCNC: 1.47 MG/DL (ref 0.66–1.25)
DACRYOCYTES BLD QL SMEAR: SLIGHT
DIFFERENTIAL METHOD BLD: ABNORMAL
EOSINOPHIL # BLD AUTO: 0 10E9/L (ref 0–0.7)
EOSINOPHIL NFR BLD AUTO: 0 %
ERYTHROCYTE [DISTWIDTH] IN BLOOD BY AUTOMATED COUNT: 21 % (ref 10–15)
GFR SERPL CREATININE-BSD FRML MDRD: 49 ML/MIN/{1.73_M2}
GLUCOSE BLDC GLUCOMTR-MCNC: 109 MG/DL (ref 70–99)
GLUCOSE BLDC GLUCOMTR-MCNC: 142 MG/DL (ref 70–99)
GLUCOSE BLDC GLUCOMTR-MCNC: 159 MG/DL (ref 70–99)
GLUCOSE BLDC GLUCOMTR-MCNC: 170 MG/DL (ref 70–99)
GLUCOSE BLDC GLUCOMTR-MCNC: 77 MG/DL (ref 70–99)
GLUCOSE BLDC GLUCOMTR-MCNC: 86 MG/DL (ref 70–99)
GLUCOSE BLDC GLUCOMTR-MCNC: 90 MG/DL (ref 70–99)
GLUCOSE SERPL-MCNC: 177 MG/DL (ref 70–99)
HCT VFR BLD AUTO: 23.9 % (ref 40–53)
HGB BLD-MCNC: 7.2 G/DL (ref 13.3–17.7)
INR PPP: 1.19 (ref 0.86–1.14)
LYMPHOCYTES # BLD AUTO: 0.2 10E9/L (ref 0.8–5.3)
LYMPHOCYTES NFR BLD AUTO: 11.7 %
MAGNESIUM SERPL-MCNC: 1.5 MG/DL (ref 1.6–2.3)
MAGNESIUM SERPL-MCNC: 2.5 MG/DL (ref 1.6–2.3)
MCH RBC QN AUTO: 30.6 PG (ref 26.5–33)
MCHC RBC AUTO-ENTMCNC: 30.1 G/DL (ref 31.5–36.5)
MCV RBC AUTO: 102 FL (ref 78–100)
MONOCYTES # BLD AUTO: 0.2 10E9/L (ref 0–1.3)
MONOCYTES NFR BLD AUTO: 15.3 %
NEUTROPHILS # BLD AUTO: 1.2 10E9/L (ref 1.6–8.3)
NEUTROPHILS NFR BLD AUTO: 72.1 %
NRBC # BLD AUTO: 0 10*3/UL
NRBC BLD AUTO-RTO: 3 /100
OVALOCYTES BLD QL SMEAR: SLIGHT
PLATELET # BLD AUTO: 243 10E9/L (ref 150–450)
PLATELET # BLD EST: ABNORMAL 10*3/UL
POLYCHROMASIA BLD QL SMEAR: ABNORMAL
POTASSIUM SERPL-SCNC: 4.5 MMOL/L (ref 3.4–5.3)
RBC # BLD AUTO: 2.35 10E12/L (ref 4.4–5.9)
SODIUM SERPL-SCNC: 137 MMOL/L (ref 133–144)
SPECIMEN SOURCE: NORMAL
WBC # BLD AUTO: 1.6 10E9/L (ref 4–11)

## 2020-10-04 PROCEDURE — 250N000013 HC RX MED GY IP 250 OP 250 PS 637: Performed by: PHYSICIAN ASSISTANT

## 2020-10-04 PROCEDURE — 250N000013 HC RX MED GY IP 250 OP 250 PS 637: Performed by: INTERNAL MEDICINE

## 2020-10-04 PROCEDURE — 80048 BASIC METABOLIC PNL TOTAL CA: CPT | Performed by: STUDENT IN AN ORGANIZED HEALTH CARE EDUCATION/TRAINING PROGRAM

## 2020-10-04 PROCEDURE — 999N001017 HC STATISTIC GLUCOSE BY METER IP

## 2020-10-04 PROCEDURE — 36415 COLL VENOUS BLD VENIPUNCTURE: CPT | Performed by: STUDENT IN AN ORGANIZED HEALTH CARE EDUCATION/TRAINING PROGRAM

## 2020-10-04 PROCEDURE — 99232 SBSQ HOSP IP/OBS MODERATE 35: CPT | Performed by: PHYSICIAN ASSISTANT

## 2020-10-04 PROCEDURE — 250N000011 HC RX IP 250 OP 636: Performed by: STUDENT IN AN ORGANIZED HEALTH CARE EDUCATION/TRAINING PROGRAM

## 2020-10-04 PROCEDURE — 97110 THERAPEUTIC EXERCISES: CPT | Mod: GO

## 2020-10-04 PROCEDURE — 99232 SBSQ HOSP IP/OBS MODERATE 35: CPT | Mod: GC | Performed by: INTERNAL MEDICINE

## 2020-10-04 PROCEDURE — 83735 ASSAY OF MAGNESIUM: CPT | Performed by: STUDENT IN AN ORGANIZED HEALTH CARE EDUCATION/TRAINING PROGRAM

## 2020-10-04 PROCEDURE — 250N000012 HC RX MED GY IP 250 OP 636 PS 637: Performed by: PHYSICIAN ASSISTANT

## 2020-10-04 PROCEDURE — 250N000012 HC RX MED GY IP 250 OP 636 PS 637: Performed by: INTERNAL MEDICINE

## 2020-10-04 PROCEDURE — 214N000001 HC R&B CCU UMMC

## 2020-10-04 PROCEDURE — 85025 COMPLETE CBC W/AUTO DIFF WBC: CPT | Performed by: STUDENT IN AN ORGANIZED HEALTH CARE EDUCATION/TRAINING PROGRAM

## 2020-10-04 PROCEDURE — 250N000013 HC RX MED GY IP 250 OP 250 PS 637: Performed by: NURSE PRACTITIONER

## 2020-10-04 PROCEDURE — 83735 ASSAY OF MAGNESIUM: CPT | Performed by: SURGERY

## 2020-10-04 PROCEDURE — 36415 COLL VENOUS BLD VENIPUNCTURE: CPT | Performed by: SURGERY

## 2020-10-04 PROCEDURE — 250N000013 HC RX MED GY IP 250 OP 250 PS 637: Performed by: STUDENT IN AN ORGANIZED HEALTH CARE EDUCATION/TRAINING PROGRAM

## 2020-10-04 PROCEDURE — 96372 THER/PROPH/DIAG INJ SC/IM: CPT | Performed by: STUDENT IN AN ORGANIZED HEALTH CARE EDUCATION/TRAINING PROGRAM

## 2020-10-04 PROCEDURE — 250N000011 HC RX IP 250 OP 636: Performed by: INTERNAL MEDICINE

## 2020-10-04 PROCEDURE — 85610 PROTHROMBIN TIME: CPT | Performed by: STUDENT IN AN ORGANIZED HEALTH CARE EDUCATION/TRAINING PROGRAM

## 2020-10-04 PROCEDURE — 250N000012 HC RX MED GY IP 250 OP 636 PS 637: Performed by: STUDENT IN AN ORGANIZED HEALTH CARE EDUCATION/TRAINING PROGRAM

## 2020-10-04 RX ORDER — FUROSEMIDE 20 MG
20 TABLET ORAL DAILY
Status: DISCONTINUED | OUTPATIENT
Start: 2020-10-04 | End: 2020-10-05 | Stop reason: HOSPADM

## 2020-10-04 RX ADMIN — Medication 1 TABLET: at 08:48

## 2020-10-04 RX ADMIN — CLOTRIMAZOLE 1 LOZENGE: 10 LOZENGE ORAL at 20:15

## 2020-10-04 RX ADMIN — TAMSULOSIN HYDROCHLORIDE 0.4 MG: 0.4 CAPSULE ORAL at 08:48

## 2020-10-04 RX ADMIN — CIPROFLOXACIN HYDROCHLORIDE 500 MG: 500 TABLET, FILM COATED ORAL at 20:15

## 2020-10-04 RX ADMIN — PREDNISONE 5 MG: 5 TABLET ORAL at 20:15

## 2020-10-04 RX ADMIN — MAGNESIUM OXIDE 400 MG: 400 TABLET ORAL at 08:48

## 2020-10-04 RX ADMIN — TACROLIMUS 3 MG: 1 CAPSULE ORAL at 08:47

## 2020-10-04 RX ADMIN — Medication 1 TABLET: at 17:08

## 2020-10-04 RX ADMIN — PANTOPRAZOLE SODIUM 40 MG: 40 TABLET, DELAYED RELEASE ORAL at 08:48

## 2020-10-04 RX ADMIN — ROSUVASTATIN CALCIUM 10 MG: 10 TABLET, FILM COATED ORAL at 08:48

## 2020-10-04 RX ADMIN — FUROSEMIDE 20 MG: 20 TABLET ORAL at 12:34

## 2020-10-04 RX ADMIN — ASPIRIN 81 MG: 81 TABLET, COATED ORAL at 08:48

## 2020-10-04 RX ADMIN — HYDRALAZINE HYDROCHLORIDE 100 MG: 100 TABLET, FILM COATED ORAL at 05:40

## 2020-10-04 RX ADMIN — CLOTRIMAZOLE 1 LOZENGE: 10 LOZENGE ORAL at 12:34

## 2020-10-04 RX ADMIN — CLOTRIMAZOLE 1 LOZENGE: 10 LOZENGE ORAL at 08:48

## 2020-10-04 RX ADMIN — HYDRALAZINE HYDROCHLORIDE 100 MG: 100 TABLET, FILM COATED ORAL at 22:12

## 2020-10-04 RX ADMIN — CIPROFLOXACIN HYDROCHLORIDE 500 MG: 500 TABLET, FILM COATED ORAL at 08:48

## 2020-10-04 RX ADMIN — CLOTRIMAZOLE 1 LOZENGE: 10 LOZENGE ORAL at 17:09

## 2020-10-04 RX ADMIN — FONDAPARINUX SODIUM 7.5 MG: 7.5 INJECTION, SOLUTION SUBCUTANEOUS at 20:15

## 2020-10-04 RX ADMIN — SODIUM HYPOCHLORITE: 5 SOLUTION TOPICAL at 20:43

## 2020-10-04 RX ADMIN — MAGNESIUM SULFATE IN WATER 4 G: 40 INJECTION, SOLUTION INTRAVENOUS at 08:54

## 2020-10-04 RX ADMIN — MAGNESIUM OXIDE 400 MG: 400 TABLET ORAL at 20:15

## 2020-10-04 RX ADMIN — HYDRALAZINE HYDROCHLORIDE 100 MG: 100 TABLET, FILM COATED ORAL at 13:56

## 2020-10-04 RX ADMIN — TACROLIMUS 2.5 MG: 1 CAPSULE ORAL at 17:08

## 2020-10-04 RX ADMIN — SODIUM HYPOCHLORITE: 5 SOLUTION TOPICAL at 08:52

## 2020-10-04 RX ADMIN — SULFAMETHOXAZOLE AND TRIMETHOPRIM 1 TABLET: 400; 80 TABLET ORAL at 08:48

## 2020-10-04 ASSESSMENT — ACTIVITIES OF DAILY LIVING (ADL)
ADLS_ACUITY_SCORE: 12

## 2020-10-04 NOTE — PROGRESS NOTES
Cardiovascular Surgery Progress Note  10/04/2020           Assessment and Plan:   Lucian Henderson Sr. is a 64 year old male with a PMH of end-stage ischemic cardiomyopathy, CAD s/p CABG 2008, DMT2, who was admitted to [facility] 07/03 for heart failure exacerbation with cardiogenic shock, underwent right subclavian IABP insertion 07/16 with Dr. Sparrow, and then heart transplant 07/20 with Dr. Griselli. 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 Now on Fondaparinux. Readmitted to [facility] 09/22 with hyperglycemia, CV surgery consulted for left former ICD pocket infection and former chest tube site infection. Underwent I&D in OR 09/23 with Dr. Huertas.     Cardiovascular:   S/p Heart transplant (7/20)  - Immunosuppression/ppx per cardiology, appreciate assistance  - Hydralazine 75 mg q8h for hypertension     Pulmonary:  Encourage IS, C&DB, ambulating  Saturating well on RA    Neuro/MSK:  Neuro intact  Acute post-operative pain - pain well controlled with tylenol     /Renal:  DAYA on CKD, non-oliguric  Cr today 1.47. stable, baseline unclear. Is making urine. Low suspicion for obstruction. I/O and BUN/Cr ratio (~22) suggestive of pre-renal dehydration.   - encourage oral intake.  - Daily BMP  - lasix 20 mg daily per Cardiology     GI/FEN:   Consistent carb diet, continue bowel regimen, + BM  Replace lytes as needed    Endo:  Streroid induced hyperglycemia  Type II Diabetes Mellitus   - Endocrine consulted, appreciate assistance    Heme:   Acute blood loss anemia  - Hgb 7.2. Plt WNL, no signs or symptoms of bleeding  - Hold-off on transfusions for now given risk of rejection.   Hx of HIT/DVT  - PTA fondaparinus 7.5 mg daily  - LE  for bilateral swelling     ID:  Left subclavian surgical wound infection  Abdominal wound surgical wound infection  Leukopenia, moderate neutropenia, medications induced  Blood culture (09/22/2020): Gram positive rods on 4th day of incubation; per ID, likely a  contaminant. Noted to be bleeding from left subclavian wound, per CVTS, large hematoma noted, debrided at bedside. Abdominal wound culture 09/23 growing strep mitis and pseudomonas. Left chest former ICD pocket culture 09/23 growing pseudomonas. CRP trending down, currently on ciprofloxacin 500 mg PO BID (End date 10/26, Dr. Aleksandar chahal 4 weeks). S/p repeat I&D on 9/28/20. Wound appears to be healing without signs of infection.   - Continue TID dressing changes with dakins solution  abdominal wound.   - Silver nitrate and quick clot prn for bleeding  - Discussed IV antibiotic with transplant ID. Since suseptibible to cipro and cipro has good bioavailability, would not recommend double coverage from pseudomonas and they do not think IV is necessary.  - Wound vac holding nicely placed 10/2, wound dimensions 10/2: 2.5 cm deep, 5 cm long, 3 cm wide. No tunneling or undermining, minimal slough. Recheck wound Monday. If still poor tissue within wound, may need to restart Dakins solution tid and hold on wound vac.   - Patient very neutropenic, off Valcyte and Cellcept, needs weekly CMV levels. Got one time dose of Neupogen, half dose 10/3. Patient at increased risk of rejection with Neupogen.     Prophylaxis:   GI ppx: protonix  DVT prophylaxis: Fonduparinox, PCD    Dispo:   - possible discharge tomorrow  - Wound vac ordered and waiting for arrival.   - Possible discharge Monday. Check immunosuppression with cardiology for discharge.    - Wound vac change Monday prior to discharge.     Staff surgeons have been informed of changes through both verbal and written communication.       Claudia Chapman PA-C  Cardiothoracic Surgery  Pager [phone]  October 4, 2020          Interval History:   No acute events overnight. States pain is well managed on current regimen, Breathing is okay. Tolerating diet, is passing flatus, + BM. Denies chest pain, palpitations, dizziness, syncopal symptoms, chills, myalgias or sternal  popping/clicking. Stable Hgb. Disappointed he is not going home.          Medications:       aspirin  81 mg Oral Daily     calcium carbonate 600 mg-vitamin D 400 units  1 tablet Oral BID w/meals     ciprofloxacin  500 mg Oral Q12H MUSA     clotrimazole  1 lozenge Buccal 4x Daily     fondaparinux ANTICOAGULANT  7.5 mg Subcutaneous Q24H     furosemide  20 mg Oral Daily     hydrALAZINE  100 mg Oral Q8H     insulin aspart   Subcutaneous TID w/meals     insulin aspart  1-10 Units Subcutaneous TID AC     insulin aspart  1-7 Units Subcutaneous At Bedtime     insulin isophane human  40 Units Subcutaneous QAM     insulin isophane human  8 Units Subcutaneous Q24H     magnesium oxide  400 mg Oral BID     pantoprazole  40 mg Oral QAM AC     predniSONE  5 mg Oral QPM     rosuvastatin  10 mg Oral Daily     senna-docusate  1 tablet Oral BID    Or     senna-docusate  2 tablet Oral BID     sodium chloride (PF)  3 mL Intracatheter Q8H     sodium hypochlorite   Topical BID     sulfamethoxazole-trimethoprim  1 tablet Oral Daily     tacrolimus  2.5 mg Oral QPM     tacrolimus  3 mg Oral QAM     tamsulosin  0.4 mg Oral Daily     acetaminophen, - MEDICATION INSTRUCTIONS -, glucose **OR** dextrose **OR** glucagon, HOLD MEDICATION, HYDROmorphone, insulin aspart, lidocaine 4%, lidocaine (buffered or not buffered), magnesium sulfate, magnesium sulfate, naloxone, ondansetron **OR** ondansetron, oxyCODONE IR, - MEDICATION INSTRUCTIONS -, polyethylene glycol, potassium chloride, potassium chloride with lidocaine, potassium chloride, potassium chloride, potassium chloride, ACE/ARB/ARNI NOT PRESCRIBED, senna-docusate, silver nitrate, sodium chloride (PF)          Physical Exam:   Vitals were reviewed  Blood pressure 120/62, pulse 89, temperature 98.4  F (36.9  C), temperature source Axillary, resp. rate 16, weight 79.7 kg (175 lb 9.6 oz), SpO2 97 %.  Vitals:    10/02/20 0157 10/03/20 0323 10/04/20 0318   Weight: 81.7 kg (180 lb 1.6 oz) 79.7 kg (175  lb 12.8 oz) 79.7 kg (175 lb 9.6 oz)      I/O last 3 completed shifts:  In: 720 [P.O.:720]  Out: 1350 [Urine:1350]    Gen: A&Ox4, NAD  CV: RRR, normal S1, S2, no murmurs, rubs or gallops   Pulm: Lungs diminished in bases, otherwise CTA, no wheezing or rhonchi  Abd: Soft, NT, ND, +BS  Ext: 2+ bilateral LE edema, legs wrapped. Left greater than right.    Neuro: Nonfocal  Wounds: wound vac in place and functioning well.          Data:    All labs and imaging reviewed by me.  Labs:    Data   Recent Labs   Lab 10/04/20  0601 10/03/20  0519 10/02/20  0957 10/02/20  0537   WBC 1.6* 1.0*  --  1.1*   HGB 7.2* 7.1* 7.8* 6.9*   * 100  --  101*    224  --  210   INR 1.19* 1.21*  --  1.12    136  --  138   POTASSIUM 4.5 4.9  --  4.8   CHLORIDE 107 106  --  107   CO2 25 26  --  26   BUN 31* 32*  --  30   CR 1.47* 1.36*  --  1.49*   ANIONGAP 5 4  --  5   BRYANNA 8.4* 8.4*  --  8.4*   * 206*  --  170*   ALBUMIN  --  2.2*  --   --      Recent Labs   Lab 10/04/20  1325 10/04/20  0847 10/04/20  0601 10/04/20  0313 10/03/20  2200 10/03/20  1721 10/03/20  1229 10/03/20  0519 10/03/20  0519 10/02/20  0537 10/02/20  0537 10/01/20  0603 10/01/20  0603 09/30/20  0540 09/30/20  0540 09/29/20  0523 09/29/20  0523   GLC  --   --  177*  --   --   --   --   --  206*  --  170*  --  233*  --  162*  --  279*   * 159*  --  170* 290* 375* 360*   < >  --    < >  --    < >  --    < >  --    < >  --     < > = values in this interval not displayed.        Imaging:  No results found for this or any previous visit (from the past 24 hour(s)).

## 2020-10-04 NOTE — PROGRESS NOTES
[facility]   Cardiology II Service / Advanced Heart Failure  Daily Progress Note      Patient: Lucian Henderson Sr.  MRN: [id number]  Admission Date: 9/22/2020  Hospital Day # 12    Assessment and Plan:  Mr. Tee Henderson is a 64 year old male w/ICM now s/p OHT 7/20/2020 c/b donor strep salvarius bacteremia, CAD s/p CABG in 2008, DM Type II, HIT, RUE DVT, urinary retention who presents to clinic today for routine heart transplant follow-up. He presents for Left subclavian surgical wound hematoma with infection and abdominal wound infection.     Recommendations today:  - Started 20 mg lasix daily  - Discontinued Valcyte 10/2  - Decrease Tac to 3 mg in the morning and 2.5 mg in the afternoon on 10/3, recheck tacrolimus on Moday  - Trend WBC count tomorrow  - Would restart cellcept when his WBC is >3 OR before prednisone is off.   - We decreased prednisone to 5mg daily (this is more than per protocol) d/t his infection, I will communicate with his transplant physician.   - Next biopsy scheduled for 10/12  - I will message his primary coordinator and transplant physician when he discharges, but please include the above immunosuppression recommendations in your discharge summary     Left ICD pocket hematoma complicated by Pseudomonas infection. Blood culture positive for Cutibacterium, contaminant per ID. S/p left surgical wound debridement at bedside per CVTS, culture positive for Pseudomonas. Abdominal wound culture positive for strep mitis and Pseudomonas.   - Appreciate ID input.  - Aim to keep ANC >1000   - Continue Cipro 500 mg po BID  - If persistent bleeding at left subclavian site consider hematology consult for recommendations on Fondaparinux.   - Wound vac per CVTS.      Status post Heart Transplantation on 7/20/20 due to ICM. Primary graft dysfunction, resolved.  Echo 9/23 with EF 55-60%, normal RV function, and no effusion. RHC 9/28 with mRA-8, RV-44/10, mPA-30, mPCWP-20, LIO CI-2.84  and LIO CO-6.34, biopsy negative.   Serostatus: CMV: D+/R+, EBV: D+/R+, Toxo: D+/R-  Immunosuppression  - Tac 3/2.5 decreased 10/3, recheck on Monday (goal 8-10 in setting of infection).   - Mycophenolate on hold (prior dose 750 mg po BID).   - Prednisone 5 mg po daily, decreased further than standard protocol   Prophylaxis:   - Continue Bactrim given D+ Toxo   - Thrush: Clotrimazole   - CMV: Moderate risk (3 monts) Discontinued Valcyte early, CMV DNA 10/3 negative, needs weekly levels per Dr. Hurtado   - CAV: Continue ASA and Crestor.  Monitoring  - Next [facility]/Biopsy/Echo due 10/12.  Graft:  - Hydralazine 100 mg q8h  - Started lasix 20 mg daily (RA 8, PCW 20, b/l LE edema)  Health Maintenance    - Defer Flu vaccine at this time given minimal availability to mount appropriate immune response.      Leukopenia and Anemia. Prior Hematology consult 8/25 notes anemia multifactorial secondary to acute illness, inflammation, drug-related (new immunosuppressants particularly the MMF, Bactrim) and renal dysfunction. It was recommended by hematology at that time to continue fondaparinux for recent line associated RIJ DVT in the setting of HIT. Total duration of anticoagulation will be 3 months.   - Neutropenic precautions.   - WBC-1.6 with ANC 1200 after 120 mcg Neupogen on 10/3, aim to keep ANCe >1000, hold off on further Neupogen for now   - Hgb 7.1.   - Continue Bactrim given D+ Toxo.   - Discontinued Valcyte 10/1     LE edema. Filling pressures stable per C 9/28.  - Will trial lasix 20 mg daily  - Encouraged protein intake, TEDS, and activity.   - Lymphedema consult.      HTN.   - Continue Hydralazine to 100 mg po TID.       RIJ and RUE DVT, provoked. US 7/22/20 consistent with nonocclusive thrombus in the right internal jugular vein along the intravenous catheter demonstrated, nonocclusive thrombus along the right PICC line in the right axillary vein demonstrated, and occlusive thrombus in the superficial right cephalic  vein demonstrated as above. Follow up US negative 9/24.  - Given HIT history continue Fondaparinux for 3 months. If persistent bleeding at left subclavian site consider hematology consult for recommendations on Fondaparinux.      History of Donor Streptococcus salivarius bacteremia  - Transplant ID consult 7/21, treated with ceftriaxone. Course complete.  ================================================================    Interval History/ROS: He is feeling well. No new complaints. He denies fever, chills, chest pain, ALBRIGHT, cough, oral lesions, SOB, nausea, vomiting, and diarrhea. He denies pain or increased drainage to wounds. He notes improvement to LE edema. He is tolerating oral intake and ambulation. No wound vac issues. He is eager to go home.    Last 24 hr care team notes reviewed.   ROS:  4 point ROS including Respiratory, CV, GI and , other than that noted in the HPI, is negative.     Medications: Reviewed in EPIC.     Physical Exam:   /62   Pulse 89   Temp 98.4  F (36.9  C) (Axillary)   Resp 16   Wt 79.7 kg (175 lb 9.6 oz)   SpO2 97%   BMI 29.22 kg/m    GENERAL: Appears alert and interacting appropriately. Speaking in full sentances and able to communicate all needs.  HEENT: Eye symmetrical and free of discharge bilaterally. Mucous membranes moist and without lesions.  NECK: Supple and without lymphadenopathy. JVD lower third of neck at 75 degrees.  CV: RRR, S1S2 present without murmur, rub, or gallop.   RESPIRATORY: Respirations regular, even, and unlabored. Lungs CTA throughout.   GI: Soft and non distended with normoactive bowel sounds present in all quadrants. No tenderness, rebound, guarding. No organomegaly.   EXTREMITIES: +2 bilateral LE peripheral edema. 2+ bilateral pedal pulses.   NEUROLOGIC: Alert and interacting appropriately. No focal deficits.   MUSCULOSKELETAL: No joint swelling or tenderness.   SKIN: No jaundice. No rashes or lesions. Left chest and abdomen covered.      Data:  CMP  Recent Labs   Lab 10/04/20  1500 10/04/20  0601 10/03/20  0519 10/02/20  0537 10/01/20  0603   NA  --  137 136 138 138   POTASSIUM  --  4.5 4.9 4.8 5.1   CHLORIDE  --  107 106 107 107   CO2  --  25 26 26 24   ANIONGAP  --  5 4 5 7   GLC  --  177* 206* 170* 233*   BUN  --  31* 32* 30 27   CR  --  1.47* 1.36* 1.49* 1.51*   GFRESTIMATED  --  49* 53* 48* 47*   GFRESTBLACK  --  56* 62 56* 55*   BRYANNA  --  8.4* 8.4* 8.4* 8.5   MAG 2.5* 1.5* 1.9 1.9 1.9   ALBUMIN  --   --  2.2*  --   --      CBC  Recent Labs   Lab 10/04/20  0601 10/03/20  0519 10/02/20  0957 10/02/20  0537 10/01/20  0603   WBC 1.6* 1.0*  --  1.1* 1.9*   RBC 2.35* 2.34*  --  2.34* 2.57*   HGB 7.2* 7.1* 7.8* 6.9* 7.8*   HCT 23.9* 23.5*  --  23.7* 25.8*   * 100  --  101* 100   MCH 30.6 30.3  --  29.5 30.4   MCHC 30.1* 30.2*  --  29.1* 30.2*   RDW 21.0* 20.7*  --  21.2* 21.1*    224  --  210 233     INR  Recent Labs   Lab 10/04/20  0601 10/03/20  0519 10/02/20  0537 10/01/20  0603   INR 1.19* 1.21* 1.12 1.10       Patient discussed with Dr. Malhotra.    Maria Elena Cazares, PA-C  [facility]

## 2020-10-04 NOTE — PLAN OF CARE
I: Monitored vitals and assessed pt status.   Changed: Abdominal dressing changed and repacked, slight amount slough present    PRN: Magnesium replacement (1.5)      A: AO*4, [place]anian speaking, able to understand english and speaks minimal english back. Sinus rhythm. S1S2 present. Dependent edema in lower extremities. Clear lung sounds, RA. Urinating adequately via toilet .[id number] CHO diet.. ACHS, CHO insulin coverage, pre meal coverage, and long acting insulin. Right PIV saline locked, wound VAC to suction (125). Up ad fidelia/SBA. Recheck Mg+ at 2.5. Pt denies pain. On neutropenic precautions.      P: Report changes to CVTS.     Niesha Khalil RN  Shift 0700 - 1900

## 2020-10-04 NOTE — PLAN OF CARE
D: admitted 9/22 for I&D of ICD pocket and old chest tube site. Wound vac placed on L chest 10/02.   Hx: ICM s/p OHT (7/2020) c/b donor strep salvarius bacteremia, CAD s/p CABG (2008), DM2, HIT, HTN, RUE DVT, and urinary retention.      I: Monitored vitals and assessed pt status.   Changed: abd dressing changed, repacked. Scant drainage and some slough present.      A: A0x4. VSS on RA. Tele shows SR, rate 80-90s. Afebrile. Urinating adequately. Pt denied pain or SOB. Wound vac to -125 suction, dressing CDI, minimal sanguinous output overnight. Up with SBA/Independent in room. On neutropenic precautions. Slept between cares.      P: Continue to monitor Pt status and report changes to CVTS.     [id number]  Jyotsna Chandler RN on 10/4/2020 at 6:14 AM

## 2020-10-05 ENCOUNTER — APPOINTMENT (OUTPATIENT)
Dept: OCCUPATIONAL THERAPY | Facility: CLINIC | Age: 65
DRG: 857 | End: 2020-10-05
Payer: COMMERCIAL

## 2020-10-05 ENCOUNTER — PATIENT OUTREACH (OUTPATIENT)
Dept: CARE COORDINATION | Facility: CLINIC | Age: 65
End: 2020-10-05

## 2020-10-05 ENCOUNTER — APPOINTMENT (OUTPATIENT)
Dept: ULTRASOUND IMAGING | Facility: CLINIC | Age: 65
DRG: 857 | End: 2020-10-05
Payer: COMMERCIAL

## 2020-10-05 ENCOUNTER — ALLIED HEALTH/NURSE VISIT (OUTPATIENT)
Dept: INTERPRETER SERVICES | Facility: CLINIC | Age: 65
End: 2020-10-05

## 2020-10-05 VITALS
BODY MASS INDEX: 29.05 KG/M2 | WEIGHT: 174.6 LBS | SYSTOLIC BLOOD PRESSURE: 113 MMHG | HEART RATE: 94 BPM | OXYGEN SATURATION: 96 % | TEMPERATURE: 98.5 F | DIASTOLIC BLOOD PRESSURE: 67 MMHG | RESPIRATION RATE: 16 BRPM

## 2020-10-05 PROBLEM — Z79.2 NEED FOR PROPHYLACTIC ANTIBIOTIC: Status: ACTIVE | Noted: 2020-10-05

## 2020-10-05 PROBLEM — A49.8 PSEUDOMONAS INFECTION: Status: ACTIVE | Noted: 2020-10-05

## 2020-10-05 PROBLEM — B27.00 EPSTEIN-BARR VIRUS VIREMIA: Status: ACTIVE | Noted: 2020-10-05

## 2020-10-05 LAB
ANION GAP SERPL CALCULATED.3IONS-SCNC: 6 MMOL/L (ref 3–14)
ANISOCYTOSIS BLD QL SMEAR: ABNORMAL
BASOPHILS # BLD AUTO: 0 10E9/L (ref 0–0.2)
BASOPHILS NFR BLD AUTO: 0.9 %
BUN SERPL-MCNC: 30 MG/DL (ref 7–30)
CALCIUM SERPL-MCNC: 8.1 MG/DL (ref 8.5–10.1)
CHLORIDE SERPL-SCNC: 108 MMOL/L (ref 94–109)
CO2 SERPL-SCNC: 25 MMOL/L (ref 20–32)
CREAT SERPL-MCNC: 1.62 MG/DL (ref 0.66–1.25)
DIFFERENTIAL METHOD BLD: ABNORMAL
EOSINOPHIL # BLD AUTO: 0 10E9/L (ref 0–0.7)
EOSINOPHIL NFR BLD AUTO: 0 %
ERYTHROCYTE [DISTWIDTH] IN BLOOD BY AUTOMATED COUNT: 21.3 % (ref 10–15)
GFR SERPL CREATININE-BSD FRML MDRD: 43 ML/MIN/{1.73_M2}
GLUCOSE BLDC GLUCOMTR-MCNC: 133 MG/DL (ref 70–99)
GLUCOSE BLDC GLUCOMTR-MCNC: 173 MG/DL (ref 70–99)
GLUCOSE BLDC GLUCOMTR-MCNC: 183 MG/DL (ref 70–99)
GLUCOSE SERPL-MCNC: 166 MG/DL (ref 70–99)
HCT VFR BLD AUTO: 25.1 % (ref 40–53)
HGB BLD-MCNC: 7.7 G/DL (ref 13.3–17.7)
INR PPP: 1.1 (ref 0.86–1.14)
LYMPHOCYTES # BLD AUTO: 0.4 10E9/L (ref 0.8–5.3)
LYMPHOCYTES NFR BLD AUTO: 17.9 %
MAGNESIUM SERPL-MCNC: 2 MG/DL (ref 1.6–2.3)
MCH RBC QN AUTO: 31.2 PG (ref 26.5–33)
MCHC RBC AUTO-ENTMCNC: 30.7 G/DL (ref 31.5–36.5)
MCV RBC AUTO: 102 FL (ref 78–100)
METAMYELOCYTES # BLD: 0 10E9/L
METAMYELOCYTES NFR BLD MANUAL: 1.8 %
MONOCYTES # BLD AUTO: 0.4 10E9/L (ref 0–1.3)
MONOCYTES NFR BLD AUTO: 17 %
MYELOCYTES # BLD: 0.1 10E9/L
MYELOCYTES NFR BLD MANUAL: 2.7 %
NEUTROPHILS # BLD AUTO: 1.4 10E9/L (ref 1.6–8.3)
NEUTROPHILS NFR BLD AUTO: 59.7 %
NRBC # BLD AUTO: 0.1 10*3/UL
NRBC BLD AUTO-RTO: 5 /100
OVALOCYTES BLD QL SMEAR: SLIGHT
PLATELET # BLD AUTO: 279 10E9/L (ref 150–450)
PLATELET # BLD EST: ABNORMAL 10*3/UL
POIKILOCYTOSIS BLD QL SMEAR: SLIGHT
POTASSIUM SERPL-SCNC: 4.6 MMOL/L (ref 3.4–5.3)
PREALB SERPL IA-MCNC: 21 MG/DL (ref 15–45)
RBC # BLD AUTO: 2.47 10E12/L (ref 4.4–5.9)
SODIUM SERPL-SCNC: 138 MMOL/L (ref 133–144)
TACROLIMUS BLD-MCNC: 12.1 UG/L (ref 5–15)
TME LAST DOSE: NORMAL H
WBC # BLD AUTO: 2.4 10E9/L (ref 4–11)

## 2020-10-05 PROCEDURE — 250N000011 HC RX IP 250 OP 636: Performed by: INTERNAL MEDICINE

## 2020-10-05 PROCEDURE — 96372 THER/PROPH/DIAG INJ SC/IM: CPT | Performed by: INTERNAL MEDICINE

## 2020-10-05 PROCEDURE — 80048 BASIC METABOLIC PNL TOTAL CA: CPT | Performed by: STUDENT IN AN ORGANIZED HEALTH CARE EDUCATION/TRAINING PROGRAM

## 2020-10-05 PROCEDURE — 93970 EXTREMITY STUDY: CPT | Mod: 26 | Performed by: RADIOLOGY

## 2020-10-05 PROCEDURE — 83735 ASSAY OF MAGNESIUM: CPT | Performed by: STUDENT IN AN ORGANIZED HEALTH CARE EDUCATION/TRAINING PROGRAM

## 2020-10-05 PROCEDURE — 93970 EXTREMITY STUDY: CPT

## 2020-10-05 PROCEDURE — 85025 COMPLETE CBC W/AUTO DIFF WBC: CPT | Performed by: STUDENT IN AN ORGANIZED HEALTH CARE EDUCATION/TRAINING PROGRAM

## 2020-10-05 PROCEDURE — 250N000012 HC RX MED GY IP 250 OP 636 PS 637: Performed by: INTERNAL MEDICINE

## 2020-10-05 PROCEDURE — 999N001017 HC STATISTIC GLUCOSE BY METER IP

## 2020-10-05 PROCEDURE — 36415 COLL VENOUS BLD VENIPUNCTURE: CPT | Performed by: STUDENT IN AN ORGANIZED HEALTH CARE EDUCATION/TRAINING PROGRAM

## 2020-10-05 PROCEDURE — 250N000013 HC RX MED GY IP 250 OP 250 PS 637: Performed by: PHYSICIAN ASSISTANT

## 2020-10-05 PROCEDURE — 99233 SBSQ HOSP IP/OBS HIGH 50: CPT | Performed by: CLINICAL NURSE SPECIALIST

## 2020-10-05 PROCEDURE — 250N000013 HC RX MED GY IP 250 OP 250 PS 637: Performed by: STUDENT IN AN ORGANIZED HEALTH CARE EDUCATION/TRAINING PROGRAM

## 2020-10-05 PROCEDURE — 250N000013 HC RX MED GY IP 250 OP 250 PS 637: Performed by: NURSE PRACTITIONER

## 2020-10-05 PROCEDURE — 99233 SBSQ HOSP IP/OBS HIGH 50: CPT | Performed by: INTERNAL MEDICINE

## 2020-10-05 PROCEDURE — 85610 PROTHROMBIN TIME: CPT | Performed by: STUDENT IN AN ORGANIZED HEALTH CARE EDUCATION/TRAINING PROGRAM

## 2020-10-05 PROCEDURE — T1013 SIGN LANG/ORAL INTERPRETER: HCPCS | Mod: U4,TEL

## 2020-10-05 PROCEDURE — 80197 ASSAY OF TACROLIMUS: CPT | Performed by: PHYSICIAN ASSISTANT

## 2020-10-05 PROCEDURE — 99232 SBSQ HOSP IP/OBS MODERATE 35: CPT | Performed by: PHYSICIAN ASSISTANT

## 2020-10-05 PROCEDURE — 250N000013 HC RX MED GY IP 250 OP 250 PS 637: Performed by: INTERNAL MEDICINE

## 2020-10-05 PROCEDURE — 97140 MANUAL THERAPY 1/> REGIONS: CPT | Mod: GO

## 2020-10-05 RX ORDER — MYCOPHENOLATE MOFETIL 250 MG/1
500 CAPSULE ORAL 2 TIMES DAILY
Qty: 120 CAPSULE | Refills: 0 | Status: SHIPPED | OUTPATIENT
Start: 2020-10-05 | End: 2020-11-27

## 2020-10-05 RX ORDER — TAMSULOSIN HYDROCHLORIDE 0.4 MG/1
0.4 CAPSULE ORAL DAILY
Qty: 60 CAPSULE | Refills: 0 | Status: SHIPPED | OUTPATIENT
Start: 2020-10-06 | End: 2021-08-18

## 2020-10-05 RX ORDER — FUROSEMIDE 20 MG
20 TABLET ORAL DAILY
Qty: 30 TABLET | Refills: 0 | Status: SHIPPED | OUTPATIENT
Start: 2020-10-06 | End: 2020-10-05

## 2020-10-05 RX ORDER — TACROLIMUS 1 MG/1
CAPSULE ORAL
Qty: 60 CAPSULE | Refills: 0
Start: 2020-10-05 | End: 2020-10-21

## 2020-10-05 RX ORDER — SODIUM HYPOCHLORITE 5 MG/ML
SOLUTION TOPICAL 3 TIMES DAILY
Qty: 473 ML | Refills: 0 | Status: SHIPPED | OUTPATIENT
Start: 2020-10-05 | End: 2020-12-07

## 2020-10-05 RX ORDER — HYDRALAZINE HYDROCHLORIDE 100 MG/1
100 TABLET, FILM COATED ORAL EVERY 8 HOURS
Qty: 90 TABLET | Refills: 0 | Status: SHIPPED | OUTPATIENT
Start: 2020-10-05 | End: 2020-11-13

## 2020-10-05 RX ORDER — CIPROFLOXACIN 500 MG/1
500 TABLET, FILM COATED ORAL EVERY 12 HOURS
Qty: 52 TABLET | Refills: 0 | Status: SHIPPED | OUTPATIENT
Start: 2020-10-05 | End: 2020-10-31

## 2020-10-05 RX ORDER — OXYCODONE HYDROCHLORIDE 5 MG/1
5 TABLET ORAL EVERY 4 HOURS PRN
Qty: 15 TABLET | Refills: 0 | Status: SHIPPED | OUTPATIENT
Start: 2020-10-05 | End: 2020-11-11

## 2020-10-05 RX ORDER — PREDNISONE 5 MG/1
5 TABLET ORAL EVERY EVENING
Qty: 14 TABLET | Refills: 0 | Status: SHIPPED | OUTPATIENT
Start: 2020-10-05 | End: 2020-10-14

## 2020-10-05 RX ORDER — ASPIRIN 81 MG/1
81 TABLET, CHEWABLE ORAL DAILY
Qty: 120 TABLET | Refills: 0 | Status: SHIPPED | OUTPATIENT
Start: 2020-10-06

## 2020-10-05 RX ORDER — AMOXICILLIN 250 MG
1 CAPSULE ORAL 2 TIMES DAILY
Qty: 60 TABLET | Refills: 0 | Status: SHIPPED | OUTPATIENT
Start: 2020-10-05 | End: 2021-12-01

## 2020-10-05 RX ADMIN — INSULIN ASPART 14 UNITS: 100 INJECTION, SOLUTION INTRAVENOUS; SUBCUTANEOUS at 08:58

## 2020-10-05 RX ADMIN — SULFAMETHOXAZOLE AND TRIMETHOPRIM 1 TABLET: 400; 80 TABLET ORAL at 08:17

## 2020-10-05 RX ADMIN — CLOTRIMAZOLE 1 LOZENGE: 10 LOZENGE ORAL at 08:18

## 2020-10-05 RX ADMIN — MAGNESIUM SULFATE 2 G: 2 INJECTION INTRAVENOUS at 11:13

## 2020-10-05 RX ADMIN — FUROSEMIDE 20 MG: 20 TABLET ORAL at 08:18

## 2020-10-05 RX ADMIN — TACROLIMUS 3 MG: 1 CAPSULE ORAL at 08:19

## 2020-10-05 RX ADMIN — CLOTRIMAZOLE 1 LOZENGE: 10 LOZENGE ORAL at 16:02

## 2020-10-05 RX ADMIN — ROSUVASTATIN CALCIUM 10 MG: 10 TABLET, FILM COATED ORAL at 08:17

## 2020-10-05 RX ADMIN — HYDRALAZINE HYDROCHLORIDE 100 MG: 100 TABLET, FILM COATED ORAL at 13:29

## 2020-10-05 RX ADMIN — PANTOPRAZOLE SODIUM 40 MG: 40 TABLET, DELAYED RELEASE ORAL at 08:18

## 2020-10-05 RX ADMIN — TAMSULOSIN HYDROCHLORIDE 0.4 MG: 0.4 CAPSULE ORAL at 08:18

## 2020-10-05 RX ADMIN — CLOTRIMAZOLE 1 LOZENGE: 10 LOZENGE ORAL at 11:14

## 2020-10-05 RX ADMIN — ASPIRIN 81 MG: 81 TABLET, COATED ORAL at 08:18

## 2020-10-05 RX ADMIN — SODIUM HYPOCHLORITE: 5 SOLUTION TOPICAL at 08:16

## 2020-10-05 RX ADMIN — Medication 1 TABLET: at 08:24

## 2020-10-05 RX ADMIN — HYDRALAZINE HYDROCHLORIDE 100 MG: 100 TABLET, FILM COATED ORAL at 05:54

## 2020-10-05 RX ADMIN — CIPROFLOXACIN HYDROCHLORIDE 500 MG: 500 TABLET, FILM COATED ORAL at 08:18

## 2020-10-05 RX ADMIN — MAGNESIUM OXIDE 400 MG: 400 TABLET ORAL at 08:17

## 2020-10-05 ASSESSMENT — ACTIVITIES OF DAILY LIVING (ADL)
ADLS_ACUITY_SCORE: 12

## 2020-10-05 NOTE — PROGRESS NOTES
[facility]   Spoke with patient using a  to discuss plans for home care. Patient to be discharged home 10/5/20 and has agreed to have [facility] follow with services of RN, PT and OT. Patient care support center processing referral.  Patient verbalized understanding that initial visit is scheduled for 10/7/20.  Provided 24 hour phone number for [facility] for any questions or concerns.    Jennifer Belcher RN BSN  [facility] Liaison  [phone]

## 2020-10-05 NOTE — PROGRESS NOTES
Care Management Discharge Note    Discharge Planning:  Expected Discharge Date: 10/05/20  Concerns to be Addressed: wound vac    Anticipated Discharge Disposition: Discharge to home  Anticipated Discharge Services: [facility]- RN/PT/OT  Anticipated Discharge DME:  Wound VAC    Referrals Placed by CM/SW: Referral placed by previous RNCC for [facility] for home wound vac dressing changes M-W-F and [facility] PT/OT.    This writer received update from PA that pt is medically ready to discharge home today with wound vac. Home wound vac was ordered by previous RNCC and is approved through [facility]. This writer called [facility] and requested that home wound vac be delivered up to unit today for discharge.   Clarified with bedside nurse that abdominal wound no longer being packed, pt educated to clean 3x/day and leave open to air, cover with 4x4 if draining. Bedside nurse sending pt home with supplies.     Jeannine Bray RN BSN  6C Unit Care Coordinator  Phone number: [phone]  Pager: [phone]

## 2020-10-05 NOTE — PLAN OF CARE
D: admitted 9/22 for I&D of ICD pocket and old chest tube site. Wound vac placed on L chest 10/02.   Hx: ICM s/p OHT (7/2020) c/b donor strep salvarius bacteremia, CAD s/p CABG (2008), DM2, HIT, HTN, RUE DVT, and urinary retention.      I: Monitored vitals and assessed pt status.   Changed: abd dressing changed, repacked. Scant drainage and some slough present.      A: A0x4. VSS on RA. Tele shows SR, rate 80-90s. Afebrile. Urinating adequately. Pt denied pain or SOB. Wound vac to -125 suction, dressing CDI, no measurable output overnight. Up with SBA/Independent in room. On neutropenic precautions. Slept between cares.      P: Continue to monitor Pt status and report changes to CVTS.     [id number]  Jyotsna Chandler RN on 10/5/2020 at 6:23 AM

## 2020-10-05 NOTE — PLAN OF CARE
DISCHARGE     Discharged to: Home  Via: Automobile  Accompanied by: Wife  Discharge Instructions: diet, activity, medications, follow up appointments, when to call the MD, and what to watchout for (i.e. s/s of infection, increasing SOB, chest pain)  Prescriptions: To be filled by discharge pharmacy per pt's request; medication list reviewed & sent with pt  Follow Up Appointments: arranged; information given  Belongings: All sent with pt  IV: out  Telemetry: off  Pt exhibits understanding of above discharge instructions; all questions answered.  Discharge Paperwork: sent

## 2020-10-05 NOTE — DISCHARGE SUMMARY
[facility], [place]   Cardiothoracic Surgery Hospital Discharge Summary     Lucian Henderson . MRN# [id number]   Age: 64 year old YOB: 1955     Admitting Physician:  Moises Santos MD  Discharge Physician:  AL Turner  Primary Care Physician:        Suyapa Hatfield     DATE OF ADMISSION: 9/22/2020      DATE OF DISCHARGE: October 5, 2020          Primary Diagnoses:   1. S/P R subclavian IABP and Heart transplant  2. Hyperglycemia   3. ICD and chest tube site cellulitis, positive for Pseudomonas   4. DAYA on CKD           Secondary Diagnoses:   1. HTN   2. 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  Surgeon: Dr. Ran Huertas   1.  Incision and debridement of left infraclavicular wound.   2.  Incision and debridement of midline chest tube wound.     Date: 9/28/20.  Surgeon: Dr. Ran Huertas   1. Excisional debridement of the left infraclavicular wound.    INTRAOPERATIVE COMPLICATIONS:  none    PATHOLOGY RESULTS:  none     CULTURE RESULTS:    1. Abdominal wound 9/23/20 culture positive for Pseudomonas aeruginosa and Streptococcus mitis group.   2. Left subclavian incision culture 9/23/20 positive for Pseudomonas aeruginosa.      CONSULTS:    1.  PT/OT  2.  Infectious Disease   3.  Endocrinology   4.  Hematology     BRIEF HISTORY OF ILLNESS:  Lucian Henderson is a 64 year old male with PMHx significant for ICM and HFrEF s/p OHT 7/19/2020, DMII, CKD stage 3, RUE DVT c/b HIT, and HTN who presented with hyperglycemia.   Patient discharged 8/3/20 after month long admission for end stage heart failure and eventual heart transplant. He received heart transplant 7/19. His immediate post transplant course was complicated by primary graft dysfunction with EF of 20-25% on POD #1 and severe RV dysfunction likely due to prolonged ischemic time. His repeat echo 7/27 showed normal LVEF. During admission he also developed HIT and R  internal jugular clot and has been on fondaparinux for anticoagulation.   He noted since discharge his blood sugars have been difficult to control. They were in the 300s but for the past few days have been in the 500s. With this he has noted feeling fatigued, dizzy, and has had increased urination. He was careful to avoid sugar and carbs and had not changed his diet recently. He had steroid induced hyperglycemia while admitted last time and endocrine followed for this. He followed up with endocrine via virtual visit 8/18 and his NPH was increased to 50 units in AM and 40 units in evening. His prednisone was slowly being tapered.   RN called 8/24/20 to follow up on blood sugars and he reported AM glucose of 430 and direct admission arranged for diabetes management. On arrival here, his blood sugars are in 330s. Patient reporeds feeling well. Denied fever, chills, shortness of breath, cough, or chest pain. He noted his lower extremity swelling is at baseline since discharge.     HOSPITAL COURSE: Lucian Henderson Sr. is a 64 year old male who was readmitted on 9/22/2020 for drainage from previous surgical sites and he underwent the above-named procedures.  He tolerated the operations well and postoperatively admitted to Telemonitoring Unit.     Cardiovascular:   S/p Heart transplant (7/20)  - Immunosuppression/ppx per cardiology, appreciate assistance  - Hydralazine 100 mg q8h for hypertension     Pulmonary:  Encourage IS, C&DB, ambulating. Saturating well on RA     Neuro/MSK:  Neuro intact. Acute post-operative pain well controlled with tylenol      /Renal:  DAYA on CKD, non-oliguric  Discharge Cr 1.62. stable, baseline unclear. Is making urine. Low suspicion for obstruction. I/O and BUN/Cr ratio (~22) suggestive of pre-renal dehydration. Encouraged oral intake.      GI/FEN:   Consistent carb diet, continue bowel regimen, + BM.     Endo:  Streroid induced hyperglycemia  Type II Diabetes Mellitus   - Endocrine  consulted, appreciate assistance. Discharging on NPH and corrective sliding scale.      Heme:   Acute blood loss anemia  - Hgb 7.2. Plt WNL, no signs or symptoms of bleeding  - Hold-off on transfusions for now given risk of rejection.   Hx of HIT/DVT  - PTA fondaparinux 7.5 mg daily  - LE  10/5 for bilateral swelling, no DVT     ID:  Left subclavian surgical wound infection  Abdominal wound surgical wound infection  Leukopenia, moderate neutropenia, medications induced  Blood culture (09/22/2020): Gram positive rods on 4th day of incubation; per ID, likely a contaminant. Noted to be bleeding from left subclavian wound, per CVTS, large hematoma noted, debrided at bedside. Abdominal wound culture 09/23 growing strep mitis and pseudomonas. Left chest former ICD pocket culture 09/23 growing pseudomonas. CRP trending down, currently on ciprofloxacin 500 mg PO BID (End date 10/26, Dr. Aleksandar chahal 4 weeks). S/p repeat I&D on 9/28/20. Wound appears to be healing without signs of infection.  Discussed IV antibiotic with transplant ID. Since suseptibible to cipro and cipro has good bioavailability, would not recommend double coverage from pseudomonas and they do not think IV is necessary.  Patient remains very neutropenic, off Valcyte and restarting low dose Cellcept at discharge, needs weekly CMV levels. Got one time dose of Neupogen, half dose 10/3. Patient at increased risk of rejection with Neupogen.   Discharged with Left subclavian Wound vac in place. Wound dimensions 10/5: 4.5 cm long, 2.5 cm wide, 1.5 deep. No tunneling or undermining, minimal slough.   - Continue Dakins solution TID on abdominal/periumbilical wound.       Prior to discharge, his pain was controlled well, he was able to perform most ADLs and ambulate without difficulty, and had full return of bowel and bladder function.  On October 5, 2020, he was discharged to home in stable condition.    Patient discharged on aspirin:  Yes 81 mg  Patient discharged  on beta blocker: no (heart transplant)   Patient discharged on ACE Inhibitor/ARB: no  DAYA           Discharge Disposition:     Discharged to home            Condition on Discharge:     Discharge condition: Stable   Discharge vitals: Blood pressure 113/67, pulse 94, temperature 98.5  F (36.9  C), temperature source Oral, resp. rate 16, weight 79.2 kg (174 lb 9.6 oz), SpO2 96 %.     Code status on discharge: Full Code     Vitals:    10/03/20 0323 10/04/20 0318 10/05/20 0556   Weight: 79.7 kg (175 lb 12.8 oz) 79.7 kg (175 lb 9.6 oz) 79.2 kg (174 lb 9.6 oz)       DAY OF DISCHARGE PHYSICAL EXAM:    Gen: A&Ox4, NAD  Neuro: no focal deficits   CV: RRR, normal S1 S2, no murmurs, rubs or gallops.   Pulm: CTA, no wheezing or rhonchi, normal breathing on RA  Abd: nondistended, normal BS, soft, nontender  Ext: no periperal edema  Incision: clean, dry, intact, no erythema, sternum stable    BMP  Recent Labs   Lab 10/05/20  0540 10/04/20  0601 10/03/20  0519 10/02/20  0537    137 136 138   POTASSIUM 4.6 4.5 4.9 4.8   CHLORIDE 108 107 106 107   BRYANNA 8.1* 8.4* 8.4* 8.4*   CO2 25 25 26 26   BUN 30 31* 32* 30   CR 1.62* 1.47* 1.36* 1.49*   * 177* 206* 170*     CBC  Recent Labs   Lab 10/05/20  0540 10/04/20  0601 10/03/20  0519 10/02/20  0957 10/02/20  0537   WBC 2.4* 1.6* 1.0*  --  1.1*   RBC 2.47* 2.35* 2.34*  --  2.34*   HGB 7.7* 7.2* 7.1* 7.8* 6.9*   HCT 25.1* 23.9* 23.5*  --  23.7*   * 102* 100  --  101*   MCH 31.2 30.6 30.3  --  29.5   MCHC 30.7* 30.1* 30.2*  --  29.1*   RDW 21.3* 21.0* 20.7*  --  21.2*    243 224  --  210     Recent Labs   Lab 10/05/20  1310 10/05/20  0814 10/05/20  0540 10/05/20  0318 10/04/20  2341 10/04/20  2252 10/04/20  2208 10/04/20  0601 10/04/20  0601 10/03/20  0519 10/03/20  0519 10/02/20  0537 10/02/20  0537 10/01/20  0603 10/01/20  0603 09/30/20  0540 09/30/20  0540   GLC  --   --  166*  --   --   --   --   --  177*  --  206*  --  170*  --  233*  --  162*   * 173*  --   133* 90 77 86   < >  --    < >  --    < >  --    < >  --    < >  --     < > = values in this interval not displayed.       POST-OPERATIVE CLINIC VISITS  You have a follow up visit with CVTS Surgery Advance Care Practitioners on 10/16 at 2 pm at the [facility] building.  You will then return to the care of your primary provider and your cardiologist. Future medication refills should come from your PCP or Cardiologist.   You will now return to the care of your primary care provider and your Cardiology care team.  Your Transplant coordinator will assist with your care and can handle all questions, including follow up appointments, lab draws, and other needs.      DISCHARGE MEDICATIONS:    Lucian Morillo Sr.   Home Medication Instructions CECILY:[id number]    Printed on:10/05/20 6501   Medication Information                      acetaminophen (TYLENOL) 325 MG tablet  Take 3 tablets (975 mg) by mouth every 8 hours as needed for mild pain             Alcohol Swabs PADS  Use to swab the area of the injection or sergio as directed   Per insurance coverage             aspirin (ASA) 81 MG chewable tablet  Take 1 tablet (81 mg) by mouth daily             blood glucose (ACCU-CHEK SMARTVIEW) test strip  USE TO TEST THREE TIMES DAILY AS DIRECTED             blood glucose (NO BRAND SPECIFIED) test strip  Use to test blood sugar 2 times daily or as directed.             blood glucose monitoring (ACCU-CHEK FELIPE SMARTVIEW) meter device kit  Use to test blood sugar 3-4 times daily, as directed.             blood glucose monitoring (ONE TOUCH DELICA) lancets  Use to test blood sugars 2 times daily.             calcium carbonate 600 mg-vitamin D 400 units (CALTRATE) 600-400 MG-UNIT per tablet  Take 1 tablet by mouth 2 times daily (with meals)             ciprofloxacin (CIPRO) 500 MG tablet  Take 1 tablet (500 mg) by mouth every 12 hours for 26 days             clotrimazole (MYCELEX) 10 MG lozenge  Place 1 lozenge  inside cheek 4 times daily             Continuous Blood Gluc Sensor (FREESTYLE JEREMY 14 DAY SENSOR) MISC  Change every 14 days.             Continuous Blood Gluc Sensor (FREESTYLE JEREMY 14 DAY SENSOR) MISC  Change every 14 days.             fondaparinux ANTICOAGULANT (ARIXTRA) 7.5MG/0.6ML injection  Inject 0.6 mLs (7.5 mg) Subcutaneous every 24 hours Hold dose on the mornings of your right heart catheterization and biopsies             hydrALAZINE (APRESOLINE) 100 MG tablet  Take 1 tablet (100 mg) by mouth every 8 hours             insulin aspart (NOVOLOG PEN) 100 UNIT/ML pen  Inject 0-31 Units Subcutaneous 3 times daily (with meals) Custom MEAL and SNACK corrective dosing     BG less than 80, do not give Novolog.  BG , give  15 units.  -169, give  17 units.  -199 give 19 units.  -229 give 21 units.  -259 give 23 units.  -289 give 25 units.  -319 give 27 units.  -349 give 29 units.  BG >/= 350 give 31 units.  To be given with meal based on pre-meal blood glucose             insulin isophane human (HUMULIN N PEN) 100 UNIT/ML injection  Inject 8 Units Subcutaneous At Bedtime             insulin isophane human (HUMULIN N PEN) 100 UNIT/ML injection  Inject 40 Units Subcutaneous every morning             insulin pen needle (32G X 4 MM) 32G X 4 MM miscellaneous  Use as directed by provider  Per insurance coverage             insulin pen needle (B-D U/F) 31G X 5 MM miscellaneous  1 Units by Device route 2 times daily Use 2 pen needles daily or as directed.             insulin pen needle (NOVOFINE) 30G X 8 MM miscellaneous  Inject 1 Box Subcutaneous 6 times daily Use 6 pen needles daily or as directed.             magnesium oxide 400 MG CAPS  Take 400 mg by mouth 2 times daily             mycophenolate (GENERIC EQUIVALENT) 250 MG capsule  Take 2 capsules (500 mg) by mouth 2 times daily             order for DME  Auto CPAP 8-15 cmH20             oxyCODONE (ROXICODONE) 5 MG  tablet  Take 1 tablet (5 mg) by mouth every 4 hours as needed for moderate to severe pain             pantoprazole (PROTONIX) 40 MG EC tablet  Take 1 tablet (40 mg) by mouth every morning (before breakfast)             polyethylene glycol (MIRALAX) 17 g packet  17 g by Oral or Feeding Tube route daily as needed for constipation             predniSONE (DELTASONE) 5 MG tablet  Take 1 tablet (5 mg) by mouth every evening             rosuvastatin (CRESTOR) 10 MG tablet  Take 1 tablet (10 mg) by mouth daily             senna-docusate (SENOKOT-S/PERICOLACE) 8.6-50 MG tablet  Take 1 tablet by mouth 2 times daily (hold for loose stools)             sodium hypochlorite (DAKINS) 0.5 % external solution  Apply topically 3 times daily with dressing changes             sulfamethoxazole-trimethoprim (BACTRIM) 400-80 MG tablet  Take 1 tablet by mouth daily             tacrolimus (GENERIC EQUIVALENT) 1 MG capsule  Take 3 mg PO q AM and 2 mg PO q PM.             tamsulosin (FLOMAX) 0.4 MG capsule  Take 1 capsule (0.4 mg) by mouth daily               CC:Suyapa Villaseñor      [facility]   Cardiothoracic Surgery  Office phone: [phone]  Office fax: [phone]

## 2020-10-05 NOTE — DISCHARGE INSTRUCTIONS
-Follow up with Marisabel Prieto in 1 week, Diabetes clinic nurse #: [phone] if need help    -CALL  [phone] to reach endocrine fellow oncall if experience any issue with DIABETES        Check glucose antes de comer y en the hora de dormir, y si siente nivel de azucar esta bajando  Humulin N (NPH) 40 units en la manana, con prednisone 5 mg  Humulin N (NPH) NPH 8 units en la hora de dormir  Novolog-  BG menos de 80, no Novolog  Para BG , tome 15 units antes de comer.  Para -169, tome 17 units antes de comer.  -199 give 19 units.  -229 give 21 units.  -259 give 23 units.  -289 give 25 units.  -319 give 27 units.  -349 give 29 units.  BG >/= 350 give 31 units.      [facility]      AFTER YOU GO HOME FROM YOUR HEART SURGERY  (Surgical Wound I and D on 9/23 and 9/28)    You had a sternotomy, avoid lifting anything greater than ten pounds for 6 weeks after surgery and then less than 20 pounds for an additional 6 weeks. Do not reach backwards or use arms to push out of chair. Do not let people pull on your arms to assist with standing. Avoid twisting or reaching too far across your body.  Avoid strenuous activities such as bowling, vacuuming, raking, shoveling, golf or tennis for 12 weeks after your surgery. It is okay to resume sex if you feel comfortable in doing so. You may have to try different positions with your partner.  Splint your chest incision by hugging a pillow or bringing your arms across your chest when coughing or sneezing.     No driving for 4 weeks after surgery or while on pain medication.     Shower or wash your incisions daily with soap and water (or as instructed), pat dry. Keep wound clean and dry, showers are okay after discharge, but don't let spray hit directly on incision. No baths or swimming for 1 month. Cover chest tube sites with gauze until they stop draining, then leave open to air. It is not abnormal for  chest tube sites to drain yellowish/clear fluid for up to 2-3 weeks after surgery.   Watch for signs of infection: increased redness, tenderness, warmth or any drainage that appears infected (pus like) or is persistent.  Also a temperature > 100.5 F or chills. Call your surgeon or primary care provider's office immediately. Remove any skin glue left on incisions after 10-14 days. This will not affect your incision and can speed up healing.    Exercise is very important in your recovery. Please follow the guidelines set up for you in your cardiac rehab classes at the hospital. If outpatient cardiac rehab was ordered for you, we highly recommend you participate. If you have problems arranging your cardiac rehab, please call [phone] for all locations, with the exception of [place], please call [phone] and [place], please call [phone].    Avoid sitting for prolonged periods of time, try to walk every hour during the day. If you have a leg incision, elevate your leg often when you are not walking.    Check your weight when you get home from the hospital and continue to check it daily through your recovery for at least a month. If you notice a weight gain of 2-3 pounds in a week, notify your primary care physician, cardiologist or surgeon.    Bowel activity may be slow after surgery. If necessary, you may take an over the counter laxative such as Milk of Magnesia or Miralax. You may have stool softeners prescribed (docusate sodium, Senokot). We recommend using stool softeners while using narcotics for pain (oxycodone/percocet, hydrocodone/vicodin, hydromorphone/dilaudid).      Wean OFF of narcotics (oxycodone, dilaudid, hydrocodone) as soon as possible. You should continue taking acetaminophen as long as you have any surgical pain as the first choice for pain control and add narcotics as necessary for pain to be tolerable.      DO NOT SMOKE.  IF YOU NEED HELP QUITTING, PLEASE TALK WITH YOUR CARDIOLOGIST  OR PRIMARY DOCTOR.    You are on a blood thinner, follow the instructions you were given in the hospital and DO NOT SKIP this medication. Try and take it the same time everyday. Your primary care physician or coumadin clinic will manage the dosing.    You had a heart transplant, so call your Transplant Coordinator with all questions or concerns.     REGARDING PRESCRIPTION REFILLS.  If you need a refill on your pain medication contact us to discuss your pain and a possible one time refill.   All other medications will be adjusted, discontinued and re-filled by your primary care physician and/or your cardiologist as they were prior to your surgery. We have given you enough for one to three month with possibly one refill.    POST-OPERATIVE CLINIC VISITS  You have a follow up visit with CVTS Surgery Advance Care Practitioners on 10/16 at 2 pm at the [facility] [place].  You will then return to the care of your primary provider and your cardiologist. Future medication refills should come from your PCP or Cardiologist.   You will now return to the care of your primary care provider and your Cardiology care team.  Your Transplant coordinator will assist with your care and can handle all questions, including follow up appointments, lab draws, and other needs.      If you do not hear from a  in 7 days, please call [phone] (choose option 1) and request to be seen with a general cardiologist or someone that you have seen in the past.   If there is a need to return to see CT Surgery please call our  at [phone].    SURGICAL QUESTIONS  Please call Corina Motley with surgical recovery and medication questions, the phone number is listed below.  She can assist you with your needs and contact other surgery care team members as indicated.    On weekends or after hours, please call [phone] and ask the  to   page the Cardiothoracic Surgery fellow on call.      Thank you,    Your  Cardiothoracic Surgery Team  Corina Motley RN Care Coordinator-  [phone]   Claudia Gates NP

## 2020-10-05 NOTE — PROGRESS NOTES
Cardiovascular Surgery Progress Note  10/05/2020         Assessment and Plan:     Lucian Henderson Sr. is a 64 year old male with a PMH of end-stage ischemic cardiomyopathy, CAD s/p CABG 2008, DMT2, who was admitted to [facility] 07/03 for heart failure exacerbation with cardiogenic shock, underwent right subclavian IABP insertion 07/16 with Dr. Sparrow, and then heart transplant 07/20 with Dr. Griselli. 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 Now on Fondaparinux. Readmitted to [facility] 09/22 with hyperglycemia, CV surgery consulted for left former ICD pocket infection and former chest tube site infection. Underwent I&D in OR 09/23 with Dr. Huertas.      Cardiovascular:   S/p Heart transplant (7/20)  - Immunosuppression/ppx per cardiology, appreciate assistance  - Hydralazine 75 mg q8h for hypertension     Pulmonary:  Encourage IS, C&DB, ambulating  Saturating well on RA     Neuro/MSK:  Neuro intact  Acute post-operative pain well controlled with tylenol      /Renal:  DAYA on CKD, non-oliguric  Cr today 1.62. stable, baseline unclear. Is making urine. Low suspicion for obstruction. I/O and BUN/Cr ratio (~22) suggestive of pre-renal dehydration.   - Encourage oral intake.  - Daily BMP  - lasix 20 mg daily per Cardiology      GI/FEN:   Consistent carb diet, continue bowel regimen, + BM  Replace lytes as needed     Endo:  Streroid induced hyperglycemia  Type II Diabetes Mellitus   - Endocrine consulted, appreciate assistance     Heme:   Acute blood loss anemia  - Hgb 7.2. Plt WNL, no signs or symptoms of bleeding  - Hold-off on transfusions for now given risk of rejection.   Hx of HIT/DVT  - PTA fondaparinus 7.5 mg daily  - LE US 10/5 for bilateral swelling, no DVT     ID:  Left subclavian surgical wound infection  Abdominal wound surgical wound infection  Leukopenia, moderate neutropenia, medications induced  Blood culture (09/22/2020): Gram positive rods on 4th day of incubation; per  ID, likely a contaminant. Noted to be bleeding from left subclavian wound, per CVTS, large hematoma noted, debrided at bedside. Abdominal wound culture 09/23 growing strep mitis and pseudomonas. Left chest former ICD pocket culture 09/23 growing pseudomonas. CRP trending down, currently on ciprofloxacin 500 mg PO BID (End date 10/26, Dr. Aleksandar chahal 4 weeks). S/p repeat I&D on 9/28/20. Wound appears to be healing without signs of infection.   - Continue TID dressing changes with dakins solution  abdominal wound.   - Silver nitrate and quick clot prn for bleeding  - Discussed IV antibiotic with transplant ID. Since suseptibible to cipro and cipro has good bioavailability, would not recommend double coverage from pseudomonas and they do not think IV is necessary.  - Wound vac placed 10/2, wound dimensions 10/5: 4.5 cm long, 2.5 cm wide, 1.5 deep. No tunneling or undermining, minimal slough. Recheck wound Monday is stable.  - Continue Dakins solution TID on periumbilical wound.    - Patient very neutropenic, off Valcyte and Cellcept, needs weekly CMV levels. Got one time dose of Neupogen, half dose 10/3. Patient at increased risk of rejection with Neupogen.      Prophylaxis:   GI ppx: protonix  DVT prophylaxis: Fonduparinox, PCD     Dispo:   - possible discharge tomorrow  - Wound vac ordered and waiting for arrival.   - Immunosuppression per cardiology for discharge.    - Wound vac changed Monday prior to discharge.      Discussed with CVTS Fellow as needed.  Staff surgeons have been informed of changes through both verbal and written communication.      Ameya Agustin PA-C  Cardiothoracic Surgery  Pager [phone]    12:19 PM   October 5, 2020          Interval History:     No overnight events.   States pain is well managed on current regimen. Slept well overnight.  Tolerating diet, is passing flatus, + BM. No nausea or vomiting.  Breathing well without complaints.   Working with therapies and ambulating in halls  without assistance.   Denies chest pain, palpitations, dizziness, syncopal symptoms, fevers, chills, myalgias, or sternal popping/clicking.         Physical Exam:   Blood pressure 113/67, pulse 94, temperature 98.5  F (36.9  C), temperature source Oral, resp. rate 16, weight 79.2 kg (174 lb 9.6 oz), SpO2 96 %.  Vitals:    10/03/20 0323 10/04/20 0318 10/05/20 0556   Weight: 79.7 kg (175 lb 12.8 oz) 79.7 kg (175 lb 9.6 oz) 79.2 kg (174 lb 9.6 oz)      Gen: A&Ox4, NAD  Neuro: no focal deficits   CV: RRR, normal S1 S2, no murmurs, rubs or gallops.   Pulm: CTA, no wheezing or rhonchi, normal breathing on RA  Abd: nondistended, normal BS, soft, nontender  Ext: no periperal edema  Incision: clean, dry, intact, no erythema, sternum stable         Data:    Imaging:  reviewed recent imaging, no acute concerns    Labs:  BMP  Recent Labs   Lab 10/05/20  0540 10/04/20  0601 10/03/20  0519 10/02/20  0537    137 136 138   POTASSIUM 4.6 4.5 4.9 4.8   CHLORIDE 108 107 106 107   BRYANNA 8.1* 8.4* 8.4* 8.4*   CO2 25 25 26 26   BUN 30 31* 32* 30   CR 1.62* 1.47* 1.36* 1.49*   * 177* 206* 170*     CBC  Recent Labs   Lab 10/05/20  0540 10/04/20  0601 10/03/20  0519 10/02/20  0957 10/02/20  0537   WBC 2.4* 1.6* 1.0*  --  1.1*   RBC 2.47* 2.35* 2.34*  --  2.34*   HGB 7.7* 7.2* 7.1* 7.8* 6.9*   HCT 25.1* 23.9* 23.5*  --  23.7*   * 102* 100  --  101*   MCH 31.2 30.6 30.3  --  29.5   MCHC 30.7* 30.1* 30.2*  --  29.1*   RDW 21.3* 21.0* 20.7*  --  21.2*    243 224  --  210     INR  Recent Labs   Lab 10/05/20  0540 10/04/20  0601 10/03/20  0519 10/02/20  0537   INR 1.10 1.19* 1.21* 1.12      Liver Function Studies -   Recent Labs   Lab Test 10/03/20  0519 09/22/20  1256   PROTTOTAL  --  5.5*   ALBUMIN 2.2* 2.5*   BILITOTAL  --  0.3   ALKPHOS  --  93   AST  --  17   ALT  --  26     GLUCOSE:   Recent Labs   Lab 10/05/20  0814 10/05/20  0540 10/05/20  0318 10/04/20  2341 10/04/20  2252 10/04/20  2208 10/04/20  1707  10/04/20  0601 10/04/20  0601 10/03/20  0519 10/03/20  0519 10/02/20  0537 10/02/20  0537 10/01/20  0603 10/01/20  0603 09/30/20  0540 09/30/20 0540   GLC  --  166*  --   --   --   --   --   --  177*  --  206*  --  170*  --  233*  --  162*   *  --  133* 90 77 86 109*   < >  --    < >  --    < >  --    < >  --    < >  --     < > = values in this interval not displayed.

## 2020-10-05 NOTE — PROGRESS NOTES
Diabetes Consult Daily  Progress Note          Assessment/Plan:    Lucian Henderson Sr is a 64 year old male with uncontrolled type 2 diabetes, hypertension, hyperlipidemia, CKD stage 3, hx of RIJ clot and remains on fondaparinux due to HIT hx, ICM s/p OHT 7/19/2020, obesity admitted on (9/22/2020) for left infraclavicular AICD pocket infection s/p I&D of left infraclavicular wound and midline chest tube wound  on (9/29/2020) by Dr. Tong      Type 2 diabetes: uncontrolled with steroid induced hyperglycemia      Glucose controlled to lower than target last evening, in setting of morning prednisone discontinuation.    Plan-  - Continue NPH 40 units in the morning (0900)  - Continue NPH 8 units at ( 2100)  - Continue novolog 1 unit per 4g and 1 per 3 grams carb plus correction scale converted over to combined home scale, in preparation for discharge  - monitor glucose before meals, HS and 0200  -hypoglycemia protocol   - message to provider, clinic requesting virtual follow up.      Discharge plan (pasted into AVS after review at beside w/ pt's wife)-    -Follow up with Marisabel Prieto in 1 week, provide patient with phone number [phone] to reach endocrine fellow oncall if experienced any issue with blood sugar. Diabetes clinic nurse #: [phone]      Check glucose antes de comer y en the hora de dormir, y si siente nivel de azucar esta bajando  NPH 40 units en la manana, con prednisone 5 mg  NPH 8 units en la hora de dormir  Para BG , tome 15 units antes de comer.  Para -169, tome 17 units antes de comer.  -199 give 19 units.  -229 give 21 units.  -259 give 23 units.  -289 give 25 units.  -319 give 27 units.  -349 give 29 units.  BG >/= 350 give 31 units.        Interval History:   The last 24 hours progress and nursing notes reviewed.    Lucian super happy to be going home.  Was worried about BG to 77 last night. Received aspart 26  "units round supper.  Prednisone only in PM, starting yesterday, 5 mg at 2000.  Prednisone will move back to AM starting tomorrow.  Then off altogether in 8-10 days following next negative RHC/bx  Explained prednisone change as part of lower BG pattern.  Lucian eating fine.  Shivani reviewed scale, same as before, with emphasis on the small changes to NPH and follow up plan with Marisabel.    PTA:  NPH 35 units am  The fixed meal was added to the sliding scale. ( as listed below)  Para -169 give 17 units antes de comer.  -199 give 19 units.  -229 give 21 units.  -259 give 23 units.  -289 give 25 units.  -319 give 27 units.  -349 give 29 units.  BG >/= 350 give 31 units.      Recent Labs   Lab 10/05/20  0814 10/05/20  0540 10/05/20  0318 10/04/20  2341 10/04/20  2252 10/04/20  2208 10/04/20  1707 10/04/20  0601 10/04/20  0601 10/03/20  0519 10/03/20  0519 10/02/20  0537 10/02/20  0537 10/01/20  0603 10/01/20  0603 09/30/20  0540 09/30/20  0540   GLC  --  166*  --   --   --   --   --   --  177*  --  206*  --  170*  --  233*  --  162*   *  --  133* 90 77 86 109*   < >  --    < >  --    < >  --    < >  --    < >  --     < > = values in this interval not displayed.               Review of Systems:   See interval hx          Medications:     Orders Placed This Encounter      Consistent Carbohydrate Diet 9074-6465 Calories: Moderate Consistent CHO (4-6 CHO units/meal)         Physical:   Vital signs:  Temp: 99  F (37.2  C) Temp src: Oral BP: 116/67 Pulse: 96   Resp: 16 SpO2: 93 % O2 Device: None (Room air) Oxygen Delivery: 2 LPM   Weight: 79.2 kg (174 lb 9.6 oz)  Estimated body mass index is 29.05 kg/m  as calculated from the following:    Height as of 8/24/20: 1.651 m (5' 5\").    Weight as of this encounter: 79.2 kg (174 lb 9.6 oz).   General:  Healthy, middle aged man in bed, answering questions appropriately.Wife supportive  Pulmonary: resp unlabored, no cough. Able to speak " fully in complete sentences.   Neurological: Alert. Mentation intact and speech normal.  Psychological: easily engaged, happy               Data:     Lab Results   Component Value Date    A1C 8.2 07/03/2020    A1C 7.9 07/08/2019    A1C 8.5 03/11/2019    A1C 7.6 10/16/2018    A1C 9.8 09/06/2017            Recent Labs   Lab Test 10/05/20  0540 10/04/20  0601    137   POTASSIUM 4.6 4.5   CHLORIDE 108 107   CO2 25 25   ANIONGAP 6 5   * 177*   BUN 30 31*   CR 1.62* 1.47*   BRYANNA 8.1* 8.4*     CBC RESULTS:   Recent Labs   Lab Test 10/05/20  0540   WBC 2.4*   RBC 2.47*   HGB 7.7*   HCT 25.1*   *   MCH 31.2   MCHC 30.7*   RDW 21.3*      I spent a total of 35 minutes bedside and on the inpatient unit managing the glycemic care of Lucian Henderson Sr.. Over 50% of my time on the unit was spent counseling the patient and wife and/or coordinating care regarding diabetes plan for home.  See note for details.    Liyah Nelson APRN -[id number]  Diabetes Management Team job code: [id number]

## 2020-10-05 NOTE — PROGRESS NOTES
[facility]  Transplant Infectious Disease Progress Note     Patient:  Lucian Henderson Sr., Date of birth 1955, Medical record number [id number]  Date of Visit:  10/05/2020         Assessment and Recommendations:   Recommendations:  - For pseudomonal wound infection, cipro PO for total of 14 days from the day of the last I&D on 9/28/2020.   - Toxo D+/R-, high risk for reactivation and would continue bactrim.  - Check CMV PCR and CBC weekly as outpatient, to follow when valcyte could be restarted as prophylaxis.     Transplant Infectious Disease will continue to follow with you.      Assessment:  Lucian Henderson Sr is a 64 year old man with ICMP s/p OHT with basiliximab induction and TAC/MMF/prednisone maintenance. Course complicated by DGF, HIT, RUE DVT.   Infectious Disease issues include:  - Surgical wound infection involving the left subclavian wound (prior ICD site) and the abdominal wound (prior drain site) with Ps aeruginosa. Underwent surgical debridement 9/23/20, returned to OR on 9/28 for I&D of hematoma, some issues with bleeding. Cultures with Pseudomonas aeruginosa (pansusceptible). Given clinical stability and improvement of wound would with known susceptibilities there is no need for double coverage of Pseudomonas. Continue ciprofloxacin, which has excellent oral bioavailability for skin and soft tissue infections. Coag neg Staph of 9/23/2020 wound does not need specific coverage. S mitis on drainage only, less likely to be significant as tissue cultures with Pseudomonas only. The chimney graft in the right subclavian area does not seem to be involved and blood cultures remain negative.  - EBV viremia, low grade. No signs or symptoms for PTLD. Represents underlying need for exogenous immunosuppression. Monitor EBV by PCR per SOP, ~ monthly for now.   - Cutibacterium species in one set out of two from blood cx 9/22/2020. This is probably a contaminant.  This is not expected to contaminate the graft in the right subclavian area.   - Leukopenia with neutropenia. Valganciclovir on hold in an attempt to improve cell counts. Please check weekly CMV while valganciclovir on hold.   - Toxoplasma D+/R- status. Will require long term or indefinite low dose Bactrim prophylaxis.  - QTc interval: 420 as of 9/22/2020  - PCP prophylaxis: Bactrim  - Viral serostatus & prophylaxis: CMV D+/R+, EBV D+/R+, HSV1+/2-, VZV +.  VGCV on hold since 10/3/2020 due to low WBC.   - Fungal prophylaxis: none.   - Immunization status: will need flu shot ~ 11/1/2020  - Gamma globulin status: replete as of 2015  - Isolation status: Good hand hygiene.    Shayla Valenzuela MD. Pager [phone]         Interval History:   Since Lucian was last seen by my ID colleague Dr. Jillian Hurtado on 10/2/2020, he has had a wound vac placed 10/2/2020. Today is my first day seeing this hospital stay. Chart reviewed to date. He is doing well with cipro as a single antibiotic, no fever. Immunosuppression is being resumed with cellcept. Pred dose increasing. Lasix stopping. Planning to discharge home. Next heart biopsy on 10/12/2020.       Transplants:  7/19/2020 (Heart), Postoperative day:  78.  Coordinator Nupur Green    Review of Systems:  CONSTITUTIONAL:  No fever.   EYES: no acute new change to vision  ENT:  No acute new change to hearing  RESPIRATORY:  No shortness of breath.   CARDIOVASCULAR:  Sinus rhythm per tele. Dependent edema in lower extremities.   GENITOURINARY:  Urinating adequately via toilet   HEME:  No transfusions  INTEGUMENT:  abdominal wound no longer being packed, pt educated to clean 3x/day and leave open to air, cover with 4x4 if draining  NEURO:  He is smiling and joking.          Current Medications & Allergies:       aspirin  81 mg Oral Daily     calcium carbonate 600 mg-vitamin D 400 units  1 tablet Oral BID w/meals     ciprofloxacin  500 mg Oral Q12H MUSA      clotrimazole  1 lozenge Buccal 4x Daily     fondaparinux ANTICOAGULANT  7.5 mg Subcutaneous Q24H     furosemide  20 mg Oral Daily     hydrALAZINE  100 mg Oral Q8H     insulin aspart  0-31 Units Subcutaneous TID w/meals     insulin isophane human  40 Units Subcutaneous QAM     insulin isophane human  8 Units Subcutaneous At Bedtime     magnesium oxide  400 mg Oral BID     pantoprazole  40 mg Oral QAM AC     predniSONE  5 mg Oral QPM     rosuvastatin  10 mg Oral Daily     senna-docusate  1 tablet Oral BID    Or     senna-docusate  2 tablet Oral BID     sodium chloride (PF)  3 mL Intracatheter Q8H     sodium hypochlorite   Topical BID     sulfamethoxazole-trimethoprim  1 tablet Oral Daily     tacrolimus  2.5 mg Oral QPM     tacrolimus  3 mg Oral QAM     tamsulosin  0.4 mg Oral Daily       Infusions/Drips:    - MEDICATION INSTRUCTIONS -       - MEDICATION INSTRUCTIONS -       ACE/ARB/ARNI NOT PRESCRIBED         Allergies   Allergen Reactions     Heparin Heparin Induced Thrombocytopenia     HIT screen sent 7/18/2020. CORRINE confirmed positive            Physical Exam:   Vitals were reviewed.  All vitals stable.  Patient Vitals for the past 24 hrs:   BP Temp Temp src Pulse Resp SpO2 Weight   10/05/20 1112 113/67 98.5  F (36.9  C) Oral 94 16 96 % --   10/05/20 0806 116/67 99  F (37.2  C) Oral 96 16 93 % --   10/05/20 0600 131/75 -- -- 96 -- -- --   10/05/20 0556 -- -- -- -- -- -- 79.2 kg (174 lb 9.6 oz)   10/05/20 0320 (!) 142/71 98.4  F (36.9  C) Oral 97 16 98 % --   10/05/20 0009 132/70 98.4  F (36.9  C) Oral 88 16 97 % --   10/04/20 1922 126/68 98.3  F (36.8  C) Oral 89 16 97 % --     Ranges for vital signs:  Temp:  [98.3  F (36.8  C)-99  F (37.2  C)] 98.5  F (36.9  C)  Pulse:  [88-97] 94  Resp:  [16] 16  BP: (113-142)/(67-75) 113/67  SpO2:  [93 %-98 %] 96 %  Vitals:    10/03/20 0323 10/04/20 0318 10/05/20 0556   Weight: 79.7 kg (175 lb 12.8 oz) 79.7 kg (175 lb 9.6 oz) 79.2 kg (174 lb 9.6 oz)       Physical  Examination:  GENERAL:  well-developed, well-nourished man, sitting in a bedside support chair in no acute distress.  HEAD:  Head is normocephalic, atraumatic   EYES:  Eyes have anicteric sclerae without conjunctival injection   ENT:  Oropharynx is moist without exudates or ulcers. Tongue is midline  NECK:  Supple.  LUNGS:  Clear to auscultation bilateral.   CARDIOVASCULAR:  Regular rate and rhythm with no murmur  ABDOMEN:  Normal bowel sounds, soft, nontender.   SKIN:  No acute rashes. L former pacemaker site with dressing, no external drainage. Abdominal wound is stable from previous- packed, edges are non-erythematous, there is no drainage, non-tender to palpation. Wound vac in place over prior ICD generator pocket.   NEUROLOGIC:  Grossly nonfocal. Active x4 extremities         Laboratory Data:     Inflammatory Markers    Recent Labs   Lab Test 09/25/20  0549 09/23/20  0710 09/22/20  1256 08/25/20  0445 07/08/19  1021   CRP 16.0* 42.0* 16.0* <2.9 9.9       Immune Globulin Studies     Recent Labs   Lab Test 04/09/15  0721   IGG 1,310   IGM 38*          Metabolic Studies       Recent Labs   Lab Test 10/05/20  0540 10/04/20  0601 10/04/20  0601 09/23/20  1147 09/23/20  1147 09/21/20  1017 09/21/20  1017 08/17/20  0847 08/17/20  0847 07/22/20  1608 07/22/20  1608 07/03/20  1559 07/03/20  1559     --  137   < >  --    < > 133   < > 133   < > 140   < > 133   POTASSIUM 4.6  --  4.5   < >  --    < > 4.7   < > 4.8   < > 4.9   < > 4.4   CHLORIDE 108  --  107   < >  --    < > 104   < > 102   < > 110*   < > 102   CO2 25  --  25   < >  --    < > 20   < > 26   < > 25   < > 25   ANIONGAP 6  --  5   < >  --    < > 9   < > 6   < > 5   < > 6   BUN 30  --  31*   < >  --    < > 45*   < > 67*   < > 40*   < > 33*   CR 1.62*  --  1.47*   < >  --    < > 1.57*   < > 1.67*   < > 1.47*   < > 1.65*   GFRESTIMATED 43*  --  49*   < >  --    < > 45*   < > 42*   < > 49*   < > 42*   *  --  177*   < >  --    < > 212*   < >  391*   < > 134*   < > 229*   A1C  --   --   --   --   --   --   --   --   --   --   --   --  8.2*   BRYANNA 8.1*  --  8.4*   < >  --    < > 7.9*   < > 8.3*   < > 8.1*   < > 8.7   PHOS  --   --   --   --   --   --  1.8*   < > 3.5   < > 2.8   < >  --    MAG 2.0   < > 1.5*   < >  --    < > 1.1*   < > 1.9   < > 2.3   < > 2.2   LACT  --   --   --   --  1.3  --   --   --   --   --  1.0   < >  --    CKT  --   --   --   --   --   --   --   --  76  --   --   --   --     < > = values in this interval not displayed.       Hepatic Studies    Recent Labs   Lab Test 10/03/20  0519 09/22/20  1256 09/14/20  1153 08/31/20  0856 08/25/20  1705 07/25/20  0541 07/25/20  0541 07/08/19  1021 07/08/19  1021   BILITOTAL  --  0.3 0.3 0.4  --    < > 0.4   < > 0.6   DBIL  --   --   --   --   --   --  0.1   < >  --    ALKPHOS  --  93 100 117  --    < > 82   < > 126   PROTTOTAL  --  5.5* 5.6* 5.6*  --    < > 5.4*   < > 7.5   ALBUMIN 2.2* 2.5* 2.7* 2.7*  --    < > 2.2*   < > 3.4   AST  --  17 19 13  --    < > 14   < > 14   ALT  --  26 26 31  --    < > 21   < > 20   LDH  --   --   --   --  433*  --   --   --  174    < > = values in this interval not displayed.       Pancreatitis testing    Recent Labs   Lab Test 08/17/20  0847 07/19/20  1613   AMYLASE  --  36   TRIG 82  --        Gout Labs      Recent Labs   Lab Test 07/08/19  1021 06/26/15  1606 04/06/15  1555   URIC 10.0* 5.7 6.5       Hematology Studies      Recent Labs   Lab Test 10/05/20  0540 10/04/20  0601 10/03/20  0519 10/02/20  0537 10/02/20  0537 10/01/20  0603 09/30/20  0540   WBC 2.4* 1.6* 1.0*  --  1.1* 1.9* 1.9*   ANEU 1.4* 1.2* 0.7*  --  0.9* 1.5* 1.6   ALYM 0.4* 0.2* 0.1*  --  0.1* 0.3* 0.2*   ELAN 0.4 0.2 0.1  --  0.1 0.1 0.1   AEOS 0.0 0.0 0.0  --  0.0 0.0 0.0   HGB 7.7* 7.2* 7.1*   < > 6.9* 7.8* 7.0*   HCT 25.1* 23.9* 23.5*  --  23.7* 25.8* 22.7*    243 224  --  210 233 182    < > = values in this interval not displayed.       Clotting Studies    Recent Labs   Lab Test  10/05/20  0540 10/04/20  0601 10/03/20  0519 10/02/20  0537 09/22/20  1256 09/22/20  1256   INR 1.10 1.19* 1.21* 1.12   < > 1.13   PTT  --   --   --   --   --  31    < > = values in this interval not displayed.       Iron Testing    Recent Labs   Lab Test 10/05/20  0540 09/26/20 1837 09/26/20 1837 09/22/20  1256 09/22/20  1256 08/25/20  0445 08/25/20 0445 08/24/20 2026 08/24/20 2026 07/08/19  1021 07/08/19  1021   IRON  --   --   --   --  66  --   --   --  88  --  49   FEB  --   --   --   --  272  --   --   --  249  --  247   IRONSAT  --   --   --   --  24  --   --   --  35  --  20   SEVERIANO  --   --   --   --  447*  --   --   --  388  --  156   *   < > 100   < > 100   < > 101*  --  101*   < > 96   FOLIC  --   --   --   --  8.5  --  8.1   < >  --   --   --    B12  --   --   --   --  584  --  328  --   --   --  258   HAPT  --   --   --   --   --   --  86  --   --   --   --    RETP  --   --  1.8   < > 2.1*  --   --   --  3.9*   < >  --    RETICABSCT  --   --  43.1   < > 48.0  --   --   --  85.2   < >  --     < > = values in this interval not displayed.       Arterial Blood Gas Testing    Recent Labs   Lab Test 07/24/20  0829 07/24/20  0413 07/23/20  2109 07/23/20  0404 07/22/20 2122 07/22/20  1608 07/22/20  1206 07/22/20  0845   PH  --   --   --  7.47* 7.44 7.44 7.43 7.41   PCO2  --   --   --  37 38 39 32* 34*   PO2  --   --   --  104 111* 80 103 123*   HCO3  --   --   --  27 26 27 22 21   O2PER 1.0L 1.5L 1.5L 40  40 40 40  40 40.0 40.0  40.0        Thyroid Studies     Recent Labs   Lab Test 08/05/20  0843 07/07/20  0720 07/04/20  0517 07/08/19  1021 01/05/17  0741   TSH 0.80 1.30 1.25 1.30 1.44       Urine Studies     Recent Labs   Lab Test 09/25/20  1241 09/22/20  1448 08/25/20  0731 07/19/20  1930 07/17/20  1402 07/08/19  1017   URINEPH 5.5 5.0 5.0 6.0 5.0 6.0   NITRITE Negative Negative Negative Negative Negative Negative   LEUKEST Negative Negative Negative Negative Large* Large*   WBCU 0 1 0 <1 13*  >182*   [facility]  --   --   --   --   --  Present*       Medication levels    Recent Labs   Lab Test 10/05/20  0540   TACROL 12.1       Microbiology:  Last Culture results with specimen source  Culture Micro   Date Value Ref Range Status   09/23/2020 No anaerobes isolated  Final   09/23/2020 Moderate growth  Pseudomonas aeruginosa   (A)  Final   09/23/2020 No anaerobes isolated  Final   09/23/2020 (A)  Final    Moderate growth  Pseudomonas aeruginosa  Susceptibility testing done on previous specimen     09/23/2020 Moderate growth  Pseudomonas aeruginosa   (A)  Final   09/23/2020 (A)  Final    Moderate growth  Streptococcus mitis group  Susceptibility testing not routinely done     09/22/2020   Final    <10,000 colonies/mL  mixed urogenital myla  Susceptibility testing not routinely done     09/22/2020 Moderate growth  Pseudomonas aeruginosa   (A)  Final   09/22/2020 (A)  Final    Light growth  Coagulase negative Staphylococcus  Susceptibility testing not routinely done     09/22/2020 No growth  Final   09/22/2020 (A)  Final    Cultured on the 4th day of incubation:  Cutibacterium (Propionibacterium) avidum     09/22/2020   Final    Critical Value/Significant Value, preliminary result only, called to and read back by  Brianna Liu RN on 9/26/20 @1111, [facility] [place]     09/22/2020   Final    (Note)  NEGATIVE for the following: Staphylococcus spp., Staph aureus, Staph  epidermidis, Staph lugdunensis, Streptococcus spp., Strep pneumoniae,  Strep pyogenes, Strep agalactiae, Strep anginosus group, Enterococcus  faecalis, Enterococcus faecium, and Listeria spp. by [employer]  multiplex nucleic acid test. Final identification and antimicrobial  susceptibility testing will be verified by standard methods.      Critical Value/Significant Value called to and read back by ALDO WHEELER GLEHRFAB 9.26.2020 AT 1.40PM,ZFAZAL     07/21/2020 No growth  Final   07/17/2020 No growth  Final   07/17/2020 No growth  Final   07/17/2020 No anaerobes  isolated  Final    Specimen Description   Date Value Ref Range Status   09/23/2020 Left SUPRACLAVICULAR WOUND NECROTIC TISSUE  Final   09/23/2020 Left SUPRACLAVICULAR WOUND NECROTIC TISSUE  Final   09/23/2020 Left SUPRACLAVICULAR WOUND NECROTIC TISSUE  Final   09/23/2020 Left SUPRACLAVICULAR WOUND  Final   09/23/2020 Left SUPRACLAVICULAR WOUND  Final   09/23/2020 Left SUPRACLAVICULAR WOUND  Final   09/23/2020 Abdominal Abscess  Final   09/23/2020 Abdominal Abscess  Final   09/22/2020 Midstream Urine  Final   09/22/2020 Subclavian Right Wound  Final   09/22/2020 Subclavian Right Wound  Final   09/22/2020 Blood Left Arm  Final   09/22/2020 Blood Right Arm  Final   07/21/2020 Blood Left Hand  Final   07/17/2020 Blood Left Arm  Final   07/17/2020 Catheter tip  Final   07/17/2020 Catheter tip  Final          Virology:  Respiratory virus testing    Recent Labs   Lab Test 09/22/20  1449 07/07/20  1456   [id number] Swab Nasopharyngeal   SARSCOVRES NEGATIVE NEGATIVE       Log IU/mL of CMVQNT   Date Value Ref Range Status   10/03/2020 Not Calculated <2.1 [Log_IU]/mL Final   08/17/2020 Not Calculated <2.1 [Log_IU]/mL Final       EBV DNA Copies/mL   Date Value Ref Range Status   08/17/2020 766 (A) EBVNEG^EBV DNA Not Detected [Copies]/mL Final       Imaging:  Recent Results (from the past 48 hour(s))   US Lower Extremity Venous Duplex Bilateral    Narrative    EXAMINATION: DOPPLER VENOUS ULTRASOUND OF BILATERAL LOWER EXTREMITIES,  10/5/2020 10:03 AM     COMPARISON: Doppler 7/31/2020.    HISTORY: Edema    TECHNIQUE:  Gray-scale evaluation with compression, spectral flow and  color Doppler assessment of the deep venous system of both legs from  groin to knee, and then at the ankles.    FINDINGS:  Right: the common femoral, femoral, popliteal and posterior tibial  veins demonstrate normal compressibility and blood flow.    Left: the common femoral, femoral, popliteal and posterior tibial  veins demonstrate normal compressibility  and blood flow.      Impression    IMPRESSION:  1.  No evidence of deep venous thrombosis in either lower extremity.    PATRICIA SUN MD

## 2020-10-05 NOTE — PLAN OF CARE
Occupational Therapy Discharge Summary    Reason for therapy discharge:    Discharged to home with outpatient therapy.    Progress towards therapy goal(s). See goals on Care Plan in [facility] electronic health record for goal details.  Goals partially met.  Barriers to achieving goals:   discharge from facility.    Therapy recommendation(s):    Continued therapy is recommended.  Rationale/Recommendations:  Pt will likely benefit from continuing skilled CR services to increase functional endurance and strength for increased independence in ADL and IADL.

## 2020-10-05 NOTE — PROGRESS NOTES
[facility]   Cardiology II Service / Advanced Heart Failure  Daily Progress Note      Patient: Lucian Henderson Sr.  MRN: [id number]  Admission Date: 9/22/2020  Hospital Day # 13    Assessment and Plan:  Mr. Tee Henderson is a 64 year old male w/ICM now s/p OHT 7/20/2020 c/b donor strep salvarius bacteremia, CAD s/p CABG in 2008, DM Type II, HIT, RUE DVT, urinary retention who presents to clinic today for routine heart transplant follow-up. He presents for Left subclavian surgical wound hematoma with infection and abdominal wound infection.     Recommendations today:  - Okay to discharge from our prospective  - Discharge on 5 mg of prednisone (this is lower than protocol), please dose in the morning, will likely be able to stop after the next biopsy. Endocrine aware  - Please restart cellcept at 500 mg BID  - CMV level next week  - Stop Lasix  - Needs labs on Thursday: CBC WITH DIFF, tacrolimus trough, and bmp  - Next biopsy scheduled for 10/12   - Antibiotic plan per ID and CVTS- per nots planning on at least one month  - Wound vac management per CVTS  - I will message his primary coordinator and transplant physician when he discharges, but please include the above immunosuppression recommendations in your discharge summary     Left ICD pocket hematoma complicated by Pseudomonas infection. Blood culture positive for Cutibacterium, contaminant per ID. S/p left surgical wound debridement at bedside per CVTS, culture positive for Pseudomonas. Abdominal wound culture positive for strep mitis and Pseudomonas.   - Appreciate ID input.  - Management per CVTS  - Aim to keep ANC >1000   - Continue Cipro 500 mg po BID  - Wound vac per CVTS.      Status post Heart Transplantation on 7/20/20 due to ICM. Primary graft dysfunction, resolved.  Echo 9/23 with EF 55-60%, normal RV function, and no effusion. RHC 9/28 with mRA-8, RV-44/10, mPA-30, mPCWP-20, LIO CI-2.84 and LIO CO-6.34, biopsy  negative.   Serostatus: CMV: D+/R+, EBV: D+/R+, Toxo: D+/R-  Immunosuppression  - Tac 3/2.5 decreased 10/3, recheck on Monday (goal 8-10 in setting of infection).   - Mycophenolate on hold (prior dose 750 mg po BID).   - Prednisone 5 mg po daily, decreased further than standard protocol   Prophylaxis:   - Continue Bactrim given D+ Toxo   - Thrush: Clotrimazole   - CMV: Moderate risk (3 monts) Discontinued Valcyte early, CMV DNA 10/3 negative, needs weekly levels per Dr. Hurtado   - CAV: Continue ASA and Crestor.  Monitoring  - Next RCH/Biopsy/Echo due 10/12.  Graft:  - Hydralazine 100 mg q8h  - Trialed lasix inpatient, Cr bump so will discharge off lasix (was not on PTA)  Health Maintenance    - Defer Flu vaccine at this time given minimal availability to mount appropriate immune response.      Leukopenia and Anemia. Prior Hematology consult 8/25 notes anemia multifactorial secondary to acute illness, inflammation, drug-related (new immunosuppressants particularly the MMF, Bactrim) and renal dysfunction. It was recommended by hematology at that time to continue fondaparinux for recent line associated RIJ DVT in the setting of HIT. Total duration of anticoagulation will be 3 months.   - Neutropenic precautions.   - WBC-2.4 with ANC 1400 after 120 mcg Neupogen on 10/3, aim to keep ANCe >1000, hold off on further Neupogen for now   - Hgb 7.7  - Continue Bactrim given D+ Toxo.   - Discontinued Valcyte 10/1     LE edema. Filling pressures stable per RHC 9/28.  - Improved after 2 days of lasix, not resolved, Cr up so will discharge off lasix  - Encouraged protein intake, TEDS, and activity.   - Lymphedema consult.      HTN.   - Continue Hydralazine to 100 mg po TID.       RIJ and RUE DVT, provoked. US 7/22/20 consistent with nonocclusive thrombus in the right internal jugular vein along the intravenous catheter demonstrated, nonocclusive thrombus along the right PICC line in the right axillary vein demonstrated, and  occlusive thrombus in the superficial right cephalic vein demonstrated as above. Follow up US negative 9/24.  - Given HIT history continue Fondaparinux for 3 months. If persistent bleeding at left subclavian site consider hematology consult for recommendations on Fondaparinux.   - No LE DVT per 10/4 ultrasound     History of Donor Streptococcus salivarius bacteremia  - Transplant ID consult 7/21, treated with ceftriaxone. Course complete.  ================================================================    Interval History/ROS: He is feeling well. Ready to go home. No fevrs or chills. No cough. No SOB. No abdominal pain. No concerns about the wound vac. No dysuria.     Last 24 hr care team notes reviewed.   ROS:  4 point ROS including Respiratory, CV, GI and , other than that noted in the HPI, is negative.     Medications: Reviewed in EPIC.     Physical Exam:   /67 (BP Location: Right arm)   Pulse 94   Temp 98.5  F (36.9  C) (Oral)   Resp 16   Wt 79.2 kg (174 lb 9.6 oz)   SpO2 96%   BMI 29.05 kg/m    GENERAL: Appears alert and interacting appropriately. Speaking in full sentances and able to communicate all needs.  HEENT: Eye symmetrical and free of discharge bilaterally. Mucous membranes moist and without lesions.  NECK: Supple and without lymphadenopathy. JVD lower third of neck at 75 degrees.  CV: RRR, S1S2 present without murmur, rub, or gallop.   RESPIRATORY: Respirations regular, even, and unlabored. Lungs CTA throughout.   GI: Soft and non distended with normoactive bowel sounds present in all quadrants. No tenderness, rebound, guarding. No organomegaly.   EXTREMITIES: +1 bilateral LE peripheral edema. 2+ bilateral pedal pulses.   NEUROLOGIC: Alert and interacting appropriately. No focal deficits.   MUSCULOSKELETAL: No joint swelling or tenderness.   SKIN: No jaundice. No rashes or lesions. Left chest and abdomen covered.     Data:  CMP  Recent Labs   Lab 10/05/20  0540 10/04/20  1500  10/04/20  0601 10/03/20  0519 10/02/20  0537     --  137 136 138   POTASSIUM 4.6  --  4.5 4.9 4.8   CHLORIDE 108  --  107 106 107   CO2 25  --  25 26 26   ANIONGAP 6  --  5 4 5   *  --  177* 206* 170*   BUN 30  --  31* 32* 30   CR 1.62*  --  1.47* 1.36* 1.49*   GFRESTIMATED 43*  --  49* 53* 48*   GFRESTBLACK 50*  --  56* 62 56*   BRYANNA 8.1*  --  8.4* 8.4* 8.4*   MAG 2.0 2.5* 1.5* 1.9 1.9   ALBUMIN  --   --   --  2.2*  --      CBC  Recent Labs   Lab 10/05/20  0540 10/04/20  0601 10/03/20  0519 10/02/20  0957 10/02/20  0537   WBC 2.4* 1.6* 1.0*  --  1.1*   RBC 2.47* 2.35* 2.34*  --  2.34*   HGB 7.7* 7.2* 7.1* 7.8* 6.9*   HCT 25.1* 23.9* 23.5*  --  23.7*   * 102* 100  --  101*   MCH 31.2 30.6 30.3  --  29.5   MCHC 30.7* 30.1* 30.2*  --  29.1*   RDW 21.3* 21.0* 20.7*  --  21.2*    243 224  --  210     INR  Recent Labs   Lab 10/05/20  0540 10/04/20  0601 10/03/20  0519 10/02/20  0537   INR 1.10 1.19* 1.21* 1.12       Patient discussed with Dr. Malhotra.    CHESTER ChowC  [facility]

## 2020-10-06 ENCOUNTER — APPOINTMENT (OUTPATIENT)
Dept: INTERPRETER SERVICES | Facility: CLINIC | Age: 65
End: 2020-10-06
Payer: COMMERCIAL

## 2020-10-06 ENCOUNTER — TELEPHONE (OUTPATIENT)
Dept: TRANSPLANT | Facility: CLINIC | Age: 65
End: 2020-10-06

## 2020-10-06 DIAGNOSIS — Z94.1 HEART REPLACED BY TRANSPLANT (H): Primary | ICD-10-CM

## 2020-10-06 NOTE — TELEPHONE ENCOUNTER
Patient Call: General  Route to LPN    Reason for call: Please connect with pts daughter regarding questions     Call back needed? Yes    Return Call Needed  Same as documented in contacts section  When to return call?: Greater than one day: Route standard priority

## 2020-10-06 NOTE — TELEPHONE ENCOUNTER
Pt called using  to discuss hospital follow up.  Pt has wound vac to chest-  homecare RN to come tomorrow to change dressing 3x weekly.  Pt to have labs drawn on Thursday 10/8.  Appointment made at [facility] for BMP, CBC, [place] level.  Reviewed meds with pt and confirmed changes: Pt is taking 5mg Prednisone daily, MMF restarted at 500 BID, pt is no longer taking Lasix.    /79 this AM, .  Pt denies fever, weight gain, or redness at incision site.  Plan to also call pt's daughter, Elisabeth, with above information.  Pt states understanding all instructions and plan of care.

## 2020-10-06 NOTE — TELEPHONE ENCOUNTER
Returned phone call to pt's daughter, Elisabeth.  Explained all medication changes that were made in the hospital, and plans for follow up.  Elisabeth with bring pt to his lab appointment this Thursday AM.     homecare will see pt to do wound vac dressing changes at home.  Per  homecare note, they will see pt tomorrow.  Elisabeth states understanding all med changes and plan of care.  She plans to go to pt's house this PM to review meds and fill med box as ordered.

## 2020-10-07 ENCOUNTER — VIRTUAL VISIT (OUTPATIENT)
Dept: PHARMACY | Facility: CLINIC | Age: 65
End: 2020-10-07
Attending: INTERNAL MEDICINE

## 2020-10-07 ENCOUNTER — TELEPHONE (OUTPATIENT)
Dept: CARDIOLOGY | Facility: CLINIC | Age: 65
End: 2020-10-07

## 2020-10-07 ENCOUNTER — TELEPHONE (OUTPATIENT)
Dept: ENDOCRINOLOGY | Facility: CLINIC | Age: 65
End: 2020-10-07

## 2020-10-07 ENCOUNTER — APPOINTMENT (OUTPATIENT)
Dept: INTERPRETER SERVICES | Facility: CLINIC | Age: 65
End: 2020-10-07
Payer: COMMERCIAL

## 2020-10-07 DIAGNOSIS — E78.5 HYPERLIPIDEMIA LDL GOAL <100: ICD-10-CM

## 2020-10-07 DIAGNOSIS — E11.3513 TYPE 2 DIABETES MELLITUS WITH PROLIFERATIVE RETINOPATHY OF BOTH EYES AND MACULAR EDEMA, UNSPECIFIED WHETHER LONG TERM INSULIN USE (H): ICD-10-CM

## 2020-10-07 DIAGNOSIS — I10 ESSENTIAL HYPERTENSION: ICD-10-CM

## 2020-10-07 DIAGNOSIS — D75.829 HIT (HEPARIN-INDUCED THROMBOCYTOPENIA) (H): ICD-10-CM

## 2020-10-07 DIAGNOSIS — Z94.1 HEART TRANSPLANT, ORTHOTOPIC, STATUS (H): Primary | ICD-10-CM

## 2020-10-07 PROCEDURE — 99606 MTMS BY PHARM EST 15 MIN: CPT | Mod: TEL | Performed by: PHARMACIST

## 2020-10-07 PROCEDURE — 99607 MTMS BY PHARM ADDL 15 MIN: CPT | Mod: TEL | Performed by: PHARMACIST

## 2020-10-07 NOTE — TELEPHONE ENCOUNTER
Called and spoke with pt's daughter, Elisabeth, regarding home care.   homecare will not see pt because he states he walked to the store.  Pt does not drive, and only leaves the home for MD appointments and lab work.  However, because he told them he leaves the home, they will not admit him to homecare.  Pt needs wound vac dressing changes 3x a week to his subclavian wound.    Spoke at length with homecare team about necessity of visits, and that the pt is not leaving the home except with his daughter.  They still decline homecare services.  After speaking with SW, new referral was sent to [facility]  They should be able to see pt tomorrow for dressing change.  If they are unable to see pt, will arrange dressing change at [facility] clinic.

## 2020-10-07 NOTE — PROGRESS NOTES
MTM ENCOUNTER  SUBJECTIVE/OBJECTIVE:                           Lucian Henderson Sr. is a 64 year old male called for a transitions of care visit. He was discharged from [facility] on 10/5/20 for hyperglycemia/ infeciton.  was present during today's visit.     Patient consented to a telehealth visit: yes  Telemedicine Visit Details  Type of service:  Telephone visit  Start Time: 1:35PM  End Time: 2:27 PM  Originating Location (pt. Location): Home  Distant Location (provider location):  [facility] MEDICATION THERAPY MANAGEMENT  Mode of Communication:  Telephone    Chief Complaint: 3 month post txp med review and STEPHEN follow-up.     Allergies/ADRs: Reviewed in chart  Tobacco: He reports that he has never smoked. He has never used smokeless tobacco.  Alcohol: not currently using  PMH: Reviewed in chart    Medication Adherence/Access: Patient uses pill box(es) and has assistance with medication administration: family member, wife, Shivani, and daughter, Elisabeth, help.  Patient takes medications 2 time(s) per day. 7:30am and 7:pm  Per patient, misses medication 0 times per week.     Heart Transplant:  Current immunosuppressants include TAC 3mg qAM & 2mg qPM and MMF 500mg BID, Prednisone 5mg daily.  Pt reports no side effects  Transplant date: 7/8/20  Estimated Creatinine Clearance: 43.5 mL/min (A) (based on SCr of 1.62 mg/dL (H)).  CMV prophylaxis:Complete.    PCP prophylaxis: Bactrim S S daily for 6 months post txp  Antifungal Prophylaxis: Clotrimazole QID until off steroids.   PPI use: Pantoprazole 40mg daily  Supplements: Mag Oxide 400mg BID ( 2 hours from MMF) and Calcium/D BID .  Tx Coordinator: Dhruv Lin MD: Moises Santos, Using Med Card: Yes  Recent Infections:  ICD and chest tube cellulitis, positive for pseudomonas. Pt is taking   Immunizations: annual flu shot 2019; Mgmnshifat14:  2009, 2015; Prevnar 13: unknwn; TDaP:  2009; Shingrix: unknown, HBV: no immunity per antibody titers.     Type  2 Diabetes:  Pt currently taking Humulin NPH 40 units qAM and 8 units qPM, Novolog sliding scale starting at 15 units. Pt is not experiencing side effects.  Blood sugar monitoring: 3 time(s) daily. Ranges (patient reported):   Date FBG/ 2hours post Lunch/2hours post Dinner /2hours post   10/7 140 (17 units)     10/6 160 (17 units) 150 (17 units) 130 (15 units)   Aspirin: Taking 81mg daily and denies side effects  Urine Albumin:   Lab Results   Component Value Date    UMALCR 3.74 10/03/2018     Lab Results   Component Value Date    A1C 8.2 07/03/2020    A1C 7.9 07/08/2019    A1C 8.5 03/11/2019    A1C 7.6 10/16/2018    A1C 9.8 09/06/2017       HIT: Pt is taking Fondaparinux once daily. Denies bruising and bleeding.     Hypertension: Current medications include Hydralazine 100mg TID.  Patient does self-monitor blood pressure. 121/73, 128/72.  Patient reports no current medication side effects.  BP Readings from Last 3 Encounters:   10/05/20 113/67   09/22/20 (!) 142/72   09/14/20 135/70     Hyperlipidemia: Current therapy includes Rosuvastatin 10mg once daily.  Pt reports no significant myalgias or other side effects.  The ASCVD Risk score ([place] ABBY Jr., et al., 2013) failed to calculate for the following reasons:    The valid total cholesterol range is 130 to 320 mg/dL  Recent Labs   Lab Test 08/17/20  0847 07/08/19  1021 06/27/15  0755 06/27/15  0755 01/11/15  1033   CHOL 118 104   < > 82 203*   HDL 75 27*   < > 32* 34*   LDL 26 41   < > 31 Cannot estimate LDL when triglyceride exceeds 400 mg/dL  107   TRIG 82 181*   < > 99 519*   CHOLHDLRATIO  --   --   --  2.6 6.0*    < > = values in this interval not displayed.     Today's Vitals: There were no vitals taken for this visit.      ASSESSMENT:                            Medication Adherence: No issues identified    Heart Transplant:  Patient should separate Magnesium and Calcium from Cipro. Magnesium should be  from MMF as well long term. Recommended she take  these at 12pm and 3-4pm.    Type 2 Diabetes:  BG fairly well controlled since being home. Reviewed insulin use and sliding scale with patient today.     HIT: Stable.     Hypertension: Stable.    Hyperlipidemia: Stable.     PLAN:                          Post Discharge Medication Reconciliation Status: discharge medications reconciled and changed, per note/orders.    1. Take Magnesium and Calcium 4 hours away from Ciprofloxacin. (at 12pm and 3-4pm)    I spent 55 minutes with this patient today. I offer these suggestions for consideration by txp team. A copy of the visit note was provided to the patient's referring provider.    Will follow up in 2 months.    The patient was sent via [employer]xus a summary of these recommendations.     Negrito Rai, Kia  [facility] Pharmacist    Phone: [phone]

## 2020-10-07 NOTE — TELEPHONE ENCOUNTER
----- Message from Marisabel Prieto PA-C sent at 10/6/2020  6:01 PM CDT -----  Regarding: RE: hospital discharge  Indeed please schedule. I have openings Thursday or next Tuesday.    ----- Message -----  From: Liyah Damon APRN CNS  Sent: 10/5/2020   1:46 PM CDT  To: Bridget Yancey MD, Marisabel Prieto PA-C, #  Subject: hospital discharge                               Lucian Feliz heading home today.  He was in with wound infection, has had prednisone change.  Please schedule him for virtual follow up with Marisabel within next week if you are able.    Thanks!  Liyah

## 2020-10-07 NOTE — PATIENT INSTRUCTIONS
Recommendations from today's MTM visit:                                                      1. Take Magnesium and Calcium at 12PM and 3-4PM so they are  from Ciprofloxacin and Mycophenolate. If taken together they can reduce absorption.     It was great to speak with you today.  I value your experience and would be very thankful for your time with providing feedback on our clinic survey. You may receive a survey via email or text message in the next few days.     Next MTM visit: 12/8 at 11:30AM over the phone    To schedule another MTM appointment, please call the clinic directly or you may call the MTM scheduling line at [phone] or toll-free at [phone].     My Clinical Pharmacist's contact information:                                                      It was a pleasure talking with you today!  Please feel free to contact me with any questions or concerns you have.      Negrito Rai, PharmD  [facility] Pharmacist    Phone: [phone]

## 2020-10-07 NOTE — TELEPHONE ENCOUNTER
[facility] now requests orders and shares plan of care/discharge summaries for some patients through [employer].  Please REPLY TO THIS MESSAGE OR ROUTE BACK TO THE AUTHOR in order to give authorization for orders when needed.  This is considered a verbal order, you will still receive a faxed copy of orders for signature.  Thank you for your assistance in improving collaboration for our patients.     FYI: Pt had home care start of care visit today and is NOT going to be able to be admitted as he is not meeting homebound criteria as he is frequently leaving the home on outings. I educated Pt on performing wet-dry dressings but will need a f/u appointment much sooner on long-term plan to do wound-vac changes.  Feel free to contact me with any questions.    Isidro Osborn, CURRYN, WCC, PHN, CDP, FACCWS  Home Care Visit Nurse  [phone]

## 2020-10-07 NOTE — Clinical Note
Reviewed insulin use and ran through several blood sugars scenarios. I believe they are getting the sliding scale down. BG moderately well controlled over the past 2 days. BG ranging from 130-160.

## 2020-10-08 ENCOUNTER — TELEPHONE (OUTPATIENT)
Dept: CARDIOLOGY | Facility: CLINIC | Age: 65
End: 2020-10-08

## 2020-10-08 DIAGNOSIS — Z94.1 HEART REPLACED BY TRANSPLANT (H): ICD-10-CM

## 2020-10-08 LAB
ANION GAP SERPL CALCULATED.3IONS-SCNC: 6 MMOL/L (ref 3–14)
BUN SERPL-MCNC: 33 MG/DL (ref 7–30)
CALCIUM SERPL-MCNC: 8.4 MG/DL (ref 8.5–10.1)
CHLORIDE SERPL-SCNC: 107 MMOL/L (ref 94–109)
CO2 SERPL-SCNC: 25 MMOL/L (ref 20–32)
CREAT SERPL-MCNC: 1.65 MG/DL (ref 0.66–1.25)
ERYTHROCYTE [DISTWIDTH] IN BLOOD BY AUTOMATED COUNT: 20.7 % (ref 10–15)
GFR SERPL CREATININE-BSD FRML MDRD: 42 ML/MIN/{1.73_M2}
GLUCOSE SERPL-MCNC: 112 MG/DL (ref 70–99)
HCT VFR BLD AUTO: 28.9 % (ref 40–53)
HGB BLD-MCNC: 8.8 G/DL (ref 13.3–17.7)
MCH RBC QN AUTO: 30.9 PG (ref 26.5–33)
MCHC RBC AUTO-ENTMCNC: 30.4 G/DL (ref 31.5–36.5)
MCV RBC AUTO: 101 FL (ref 78–100)
PLATELET # BLD AUTO: 395 10E9/L (ref 150–450)
POTASSIUM SERPL-SCNC: 4.5 MMOL/L (ref 3.4–5.3)
RBC # BLD AUTO: 2.85 10E12/L (ref 4.4–5.9)
SODIUM SERPL-SCNC: 138 MMOL/L (ref 133–144)
TACROLIMUS BLD-MCNC: 9.1 UG/L (ref 5–15)
TME LAST DOSE: 1215 H
WBC # BLD AUTO: 6 10E9/L (ref 4–11)

## 2020-10-08 PROCEDURE — 80197 ASSAY OF TACROLIMUS: CPT | Performed by: INTERNAL MEDICINE

## 2020-10-08 PROCEDURE — 85027 COMPLETE CBC AUTOMATED: CPT | Performed by: INTERNAL MEDICINE

## 2020-10-08 PROCEDURE — 80048 BASIC METABOLIC PNL TOTAL CA: CPT | Performed by: INTERNAL MEDICINE

## 2020-10-09 ENCOUNTER — MEDICAL CORRESPONDENCE (OUTPATIENT)
Dept: HEALTH INFORMATION MANAGEMENT | Facility: CLINIC | Age: 65
End: 2020-10-09

## 2020-10-09 ENCOUNTER — PRE VISIT (OUTPATIENT)
Dept: TRANSPLANT | Facility: CLINIC | Age: 65
End: 2020-10-09

## 2020-10-09 ENCOUNTER — TELEPHONE (OUTPATIENT)
Dept: TRANSPLANT | Facility: CLINIC | Age: 65
End: 2020-10-09

## 2020-10-09 DIAGNOSIS — Z94.1 HEART REPLACED BY TRANSPLANT (H): Primary | ICD-10-CM

## 2020-10-09 LAB
CMV DNA SPEC NAA+PROBE-ACNC: NORMAL [IU]/ML
CMV DNA SPEC NAA+PROBE-LOG#: NORMAL {LOG_IU}/ML
SPECIMEN SOURCE: NORMAL

## 2020-10-09 RX ORDER — LIDOCAINE 40 MG/G
CREAM TOPICAL
Status: CANCELLED | OUTPATIENT
Start: 2020-10-09

## 2020-10-09 NOTE — TELEPHONE ENCOUNTER
Called pt to confirm that nurse from [facility] came to replace his wound vac dressing.  Nurse was there earlier today and placed wound vac dressing.  Pt states nurse was very helpful, and gave him numbers to call if wound vac should come loose again.  Pt reminded about biopsy appointment on Monday- also reminded to hold blood thinner prior to appointment.

## 2020-10-09 NOTE — TELEPHONE ENCOUNTER
Called pt using  to confirm [facility] came to see pt today as planned.  Lucian states they are coming tomorrow.  Upon further investigation, it was revealed that pt had removed his wound vac dressing himself.  He states he tripped on the cord getting out of bed, and the dressing came loose- so he removed it.    Pt has wound , NS, gauze packets, and tape at home.  Walked pt and wife through how to do a wet to dry dressing.  Pt instructed to leave dressing in place until nurse comes tomorrow to replace the wound vac.  Dr. Ramirez updated regarding wound vac not being in place.  If home care not able to replace tomorrow, pt will need to come to either [facility] or ER for wound vac dressing change.

## 2020-10-12 ENCOUNTER — DOCUMENTATION ONLY (OUTPATIENT)
Dept: ENDOCRINOLOGY | Facility: CLINIC | Age: 65
End: 2020-10-12

## 2020-10-12 ENCOUNTER — APPOINTMENT (OUTPATIENT)
Dept: LAB | Facility: CLINIC | Age: 65
End: 2020-10-12
Attending: INTERNAL MEDICINE
Payer: COMMERCIAL

## 2020-10-12 ENCOUNTER — TELEPHONE (OUTPATIENT)
Dept: TRANSPLANT | Facility: CLINIC | Age: 65
End: 2020-10-12

## 2020-10-12 ENCOUNTER — RESULTS ONLY (OUTPATIENT)
Dept: OTHER | Facility: CLINIC | Age: 65
End: 2020-10-12

## 2020-10-12 ENCOUNTER — HOSPITAL ENCOUNTER (OUTPATIENT)
Facility: CLINIC | Age: 65
Discharge: HOME | End: 2020-10-12
Attending: INTERNAL MEDICINE | Admitting: INTERNAL MEDICINE
Payer: COMMERCIAL

## 2020-10-12 ENCOUNTER — HOSPITAL ENCOUNTER (OUTPATIENT)
Dept: CARDIOLOGY | Facility: CLINIC | Age: 65
End: 2020-10-12
Attending: INTERNAL MEDICINE
Payer: COMMERCIAL

## 2020-10-12 ENCOUNTER — APPOINTMENT (OUTPATIENT)
Dept: MEDSURG UNIT | Facility: CLINIC | Age: 65
End: 2020-10-12
Attending: INTERNAL MEDICINE
Payer: COMMERCIAL

## 2020-10-12 VITALS
TEMPERATURE: 98 F | OXYGEN SATURATION: 99 % | BODY MASS INDEX: 28.96 KG/M2 | SYSTOLIC BLOOD PRESSURE: 149 MMHG | HEART RATE: 113 BPM | WEIGHT: 174 LBS | RESPIRATION RATE: 16 BRPM | DIASTOLIC BLOOD PRESSURE: 68 MMHG

## 2020-10-12 DIAGNOSIS — Z94.1 HEART REPLACED BY TRANSPLANT (H): Primary | ICD-10-CM

## 2020-10-12 DIAGNOSIS — Z94.1 HEART REPLACED BY TRANSPLANT (H): ICD-10-CM

## 2020-10-12 LAB
ALBUMIN SERPL-MCNC: 2.8 G/DL (ref 3.4–5)
ALP SERPL-CCNC: 119 U/L (ref 40–150)
ALT SERPL W P-5'-P-CCNC: 20 U/L (ref 0–70)
ANION GAP SERPL CALCULATED.3IONS-SCNC: 9 MMOL/L (ref 3–14)
AST SERPL W P-5'-P-CCNC: 23 U/L (ref 0–45)
BILIRUB SERPL-MCNC: 0.2 MG/DL (ref 0.2–1.3)
BUN SERPL-MCNC: 35 MG/DL (ref 7–30)
CALCIUM SERPL-MCNC: 8.5 MG/DL (ref 8.5–10.1)
CHLORIDE SERPL-SCNC: 108 MMOL/L (ref 94–109)
CO2 SERPL-SCNC: 21 MMOL/L (ref 20–32)
CREAT SERPL-MCNC: 1.89 MG/DL (ref 0.66–1.25)
ERYTHROCYTE [DISTWIDTH] IN BLOOD BY AUTOMATED COUNT: 20 % (ref 10–15)
GFR SERPL CREATININE-BSD FRML MDRD: 36 ML/MIN/{1.73_M2}
GLUCOSE SERPL-MCNC: 90 MG/DL (ref 70–99)
HCT VFR BLD AUTO: 30.2 % (ref 40–53)
HGB BLD-MCNC: 9 G/DL (ref 13.3–17.7)
INR PPP: 1.06 (ref 0.86–1.14)
MAGNESIUM SERPL-MCNC: 1.6 MG/DL (ref 1.6–2.3)
MCH RBC QN AUTO: 30.3 PG (ref 26.5–33)
MCHC RBC AUTO-ENTMCNC: 29.8 G/DL (ref 31.5–36.5)
MCV RBC AUTO: 102 FL (ref 78–100)
PHOSPHATE SERPL-MCNC: 3.2 MG/DL (ref 2.5–4.5)
PLATELET # BLD AUTO: 355 10E9/L (ref 150–450)
POTASSIUM SERPL-SCNC: 4.4 MMOL/L (ref 3.4–5.3)
PROT SERPL-MCNC: 6.1 G/DL (ref 6.8–8.8)
RBC # BLD AUTO: 2.97 10E12/L (ref 4.4–5.9)
SODIUM SERPL-SCNC: 138 MMOL/L (ref 133–144)
TACROLIMUS BLD-MCNC: 9.7 UG/L (ref 5–15)
TME LAST DOSE: NORMAL H
WBC # BLD AUTO: 8.6 10E9/L (ref 4–11)

## 2020-10-12 PROCEDURE — 93325 DOPPLER ECHO COLOR FLOW MAPG: CPT | Mod: 26 | Performed by: STUDENT IN AN ORGANIZED HEALTH CARE EDUCATION/TRAINING PROGRAM

## 2020-10-12 PROCEDURE — T1013 SIGN LANG/ORAL INTERPRETER: HCPCS | Mod: U3

## 2020-10-12 PROCEDURE — 93505 ENDOMYOCARDIAL BIOPSY: CPT | Performed by: INTERNAL MEDICINE

## 2020-10-12 PROCEDURE — 93321 DOPPLER ECHO F-UP/LMTD STD: CPT | Mod: 26 | Performed by: STUDENT IN AN ORGANIZED HEALTH CARE EDUCATION/TRAINING PROGRAM

## 2020-10-12 PROCEDURE — 87799 DETECT AGENT NOS DNA QUANT: CPT | Performed by: INTERNAL MEDICINE

## 2020-10-12 PROCEDURE — 88307 TISSUE EXAM BY PATHOLOGIST: CPT | Mod: 26 | Performed by: PATHOLOGY

## 2020-10-12 PROCEDURE — 93308 TTE F-UP OR LMTD: CPT | Mod: 26 | Performed by: STUDENT IN AN ORGANIZED HEALTH CARE EDUCATION/TRAINING PROGRAM

## 2020-10-12 PROCEDURE — 84100 ASSAY OF PHOSPHORUS: CPT | Performed by: INTERNAL MEDICINE

## 2020-10-12 PROCEDURE — 88350 IMFLUOR EA ADDL 1ANTB STN PX: CPT | Mod: 26 | Performed by: PATHOLOGY

## 2020-10-12 PROCEDURE — 250N000009 HC RX 250: Performed by: INTERNAL MEDICINE

## 2020-10-12 PROCEDURE — 88350 IMFLUOR EA ADDL 1ANTB STN PX: CPT | Mod: TC | Performed by: INTERNAL MEDICINE

## 2020-10-12 PROCEDURE — 36415 COLL VENOUS BLD VENIPUNCTURE: CPT | Performed by: INTERNAL MEDICINE

## 2020-10-12 PROCEDURE — 80197 ASSAY OF TACROLIMUS: CPT | Performed by: INTERNAL MEDICINE

## 2020-10-12 PROCEDURE — 99214 OFFICE O/P EST MOD 30 MIN: CPT | Mod: 25 | Performed by: NURSE PRACTITIONER

## 2020-10-12 PROCEDURE — 88346 IMFLUOR 1ST 1ANTB STAIN PX: CPT | Mod: 26 | Performed by: PATHOLOGY

## 2020-10-12 PROCEDURE — 85027 COMPLETE CBC AUTOMATED: CPT | Performed by: INTERNAL MEDICINE

## 2020-10-12 PROCEDURE — 88307 TISSUE EXAM BY PATHOLOGIST: CPT | Mod: TC | Performed by: INTERNAL MEDICINE

## 2020-10-12 PROCEDURE — 86832 HLA CLASS I HIGH DEFIN QUAL: CPT | Performed by: INTERNAL MEDICINE

## 2020-10-12 PROCEDURE — 83735 ASSAY OF MAGNESIUM: CPT | Performed by: INTERNAL MEDICINE

## 2020-10-12 PROCEDURE — 86833 HLA CLASS II HIGH DEFIN QUAL: CPT | Performed by: INTERNAL MEDICINE

## 2020-10-12 PROCEDURE — 272N000001 HC OR GENERAL SUPPLY STERILE: Performed by: INTERNAL MEDICINE

## 2020-10-12 PROCEDURE — C1894 INTRO/SHEATH, NON-LASER: HCPCS | Performed by: INTERNAL MEDICINE

## 2020-10-12 PROCEDURE — 86352 CELL FUNCTION ASSAY W/STIM: CPT | Performed by: INTERNAL MEDICINE

## 2020-10-12 PROCEDURE — 93505 ENDOMYOCARDIAL BIOPSY: CPT | Mod: 26 | Performed by: INTERNAL MEDICINE

## 2020-10-12 PROCEDURE — 999N000132 HC STATISTIC PP CARE STAGE 1

## 2020-10-12 PROCEDURE — 85610 PROTHROMBIN TIME: CPT | Performed by: INTERNAL MEDICINE

## 2020-10-12 PROCEDURE — 80053 COMPREHEN METABOLIC PANEL: CPT | Performed by: INTERNAL MEDICINE

## 2020-10-12 PROCEDURE — 88346 IMFLUOR 1ST 1ANTB STAIN PX: CPT | Mod: TC | Performed by: INTERNAL MEDICINE

## 2020-10-12 PROCEDURE — 93321 DOPPLER ECHO F-UP/LMTD STD: CPT

## 2020-10-12 RX ORDER — FUROSEMIDE 20 MG
20 TABLET ORAL DAILY
Qty: 90 TABLET | Refills: 3 | Status: SHIPPED | OUTPATIENT
Start: 2020-10-12 | End: 2022-01-24

## 2020-10-12 RX ORDER — LIDOCAINE 40 MG/G
CREAM TOPICAL
Status: COMPLETED | OUTPATIENT
Start: 2020-10-12 | End: 2020-10-12

## 2020-10-12 RX ADMIN — LIDOCAINE: 40 CREAM TOPICAL at 08:44

## 2020-10-12 NOTE — IP AVS SNAPSHOT
MRN:[id number]                      After Visit Summary   10/12/2020    Lucian Henderson .    MRN: [id number]           Visit Information        Department      10/12/2020  7:58 AM [facility] [address] [place]          Review of your medicines      UNREVIEWED medicines. Ask your doctor about these medicines       Dose / Directions   acetaminophen 325 MG tablet  Commonly known as: TYLENOL  Used for: Heart transplant, orthotopic, status (H)      Dose: 975 mg  Take 3 tablets (975 mg) by mouth every 8 hours as needed for mild pain  Quantity: 60 tablet  Refills: 3     aspirin 81 MG chewable tablet  Commonly known as: ASA  Used for: Heart transplant, orthotopic, status (H)      Dose: 81 mg  Take 1 tablet (81 mg) by mouth daily  Quantity: 120 tablet  Refills: 0     calcium carbonate 600 mg-vitamin D 400 units 600-400 MG-UNIT per tablet  Commonly known as: CALTRATE  Used for: Heart transplant, orthotopic, status (H)      Dose: 1 tablet  Take 1 tablet by mouth 2 times daily (with meals)  Quantity: 180 tablet  Refills: 3     ciprofloxacin 500 MG tablet  Commonly known as: CIPRO  Indication: infection post transplant  Used for: Heart transplant, orthotopic, status (H)      Dose: 500 mg  Take 1 tablet (500 mg) by mouth every 12 hours for 26 days  Quantity: 52 tablet  Refills: 0     clotrimazole 10 MG lozenge  Commonly known as: MYCELEX  Used for: Heart replaced by transplant (H)      Dose: 1 lozenge  Place 1 lozenge inside cheek 4 times daily  Quantity: 120 lozenge  Refills: 3     fondaparinux ANTICOAGULANT 7.5MG/0.6ML injection  Commonly known as: ARIXTRA  Used for: HIT (heparin-induced thrombocytopenia) (H)      Dose: 7.5 mg  Inject 0.6 mLs (7.5 mg) Subcutaneous every 24 hours Hold dose on the mornings of your right heart catheterization and biopsies  Quantity: 90 Syringe  Refills: 3     hydrALAZINE 100 MG tablet  Commonly known as: APRESOLINE  Used for: Heart transplant, orthotopic,  status (H)      Dose: 100 mg  Take 1 tablet (100 mg) by mouth every 8 hours  Quantity: 90 tablet  Refills: 0     insulin aspart 100 UNIT/ML pen  Commonly known as: NovoLOG PEN  Used for: Heart transplant, orthotopic, status (H)      Dose: 0-31 Units  Inject 0-31 Units Subcutaneous 3 times daily (with meals) Custom MEAL and SNACK corrective dosing     BG less than 80, do not give Novolog.  BG , give  15 units.  -169, give  17 units.  -199 give 19 units.  -229 give 21 units.  -259 give 23 units.  -289 give 25 units.  -319 give 27 units.  -349 give 29 units.  BG >/= 350 give 31 units.  To be given with meal based on pre-meal blood glucose  Quantity: 6 mL  Refills: 0     * insulin isophane human 100 UNIT/ML injection  Commonly known as: HumuLIN N PEN  Used for: Heart transplant, orthotopic, status (H)      Dose: 8 Units  Inject 8 Units Subcutaneous At Bedtime  Quantity: 3 mL  Refills: 1     * insulin isophane human 100 UNIT/ML injection  Commonly known as: HumuLIN N PEN  Used for: Heart transplant, orthotopic, status (H)      Dose: 40 Units  Inject 40 Units Subcutaneous every morning  Quantity: 6 mL  Refills: 1     magnesium oxide 400 MG Caps  Used for: Heart replaced by transplant (H)      Dose: 400 mg  Take 400 mg by mouth 2 times daily  Quantity: 180 capsule  Refills: 3     mycophenolate 250 MG capsule  Commonly known as: GENERIC EQUIVALENT  Used for: Heart transplant, orthotopic, status (H)      Dose: 500 mg  Take 2 capsules (500 mg) by mouth 2 times daily  Quantity: 120 capsule  Refills: 0     oxyCODONE 5 MG tablet  Commonly known as: ROXICODONE  Used for: Heart transplant, orthotopic, status (H)      Dose: 5 mg  Take 1 tablet (5 mg) by mouth every 4 hours as needed for moderate to severe pain  Quantity: 15 tablet  Refills: 0     pantoprazole 40 MG EC tablet  Commonly known as: PROTONIX  Used for: Heart transplant, orthotopic, status (H)      Dose: 40 mg  Take 1  tablet (40 mg) by mouth every morning (before breakfast)  Quantity: 90 tablet  Refills: 3     polyethylene glycol 17 g packet  Commonly known as: MIRALAX  Used for: Heart transplant, orthotopic, status (H)      Dose: 17 g  17 g by Oral or Feeding Tube route daily as needed for constipation  Quantity: 7 packet  Refills: 0     predniSONE 5 MG tablet  Commonly known as: DELTASONE  Used for: Heart transplant, orthotopic, status (H)      Dose: 5 mg  Take 1 tablet (5 mg) by mouth every evening  Quantity: 14 tablet  Refills: 0     rosuvastatin 10 MG tablet  Commonly known as: CRESTOR  Used for: Heart transplant, orthotopic, status (H)      Dose: 10 mg  Take 1 tablet (10 mg) by mouth daily  Quantity: 90 tablet  Refills: 3     senna-docusate 8.6-50 MG tablet  Commonly known as: SENOKOT-S/PERICOLACE  Used for: Heart transplant, orthotopic, status (H)      Dose: 1 tablet  Take 1 tablet by mouth 2 times daily (hold for loose stools)  Quantity: 60 tablet  Refills: 0     sodium hypochlorite 0.5 % external solution  Commonly known as: DAKINS  Used for: Heart transplant, orthotopic, status (H)      Apply topically 3 times daily with dressing changes  Quantity: 473 mL  Refills: 0     sulfamethoxazole-trimethoprim 400-80 MG tablet  Commonly known as: BACTRIM  Indication: Preventative Medication Therapy Used Around Surgery  Used for: Heart transplant, orthotopic, status (H)      Dose: 1 tablet  Take 1 tablet by mouth daily  Quantity: 90 tablet  Refills: 3     tacrolimus 1 MG capsule  Commonly known as: GENERIC EQUIVALENT  Used for: Heart transplant, orthotopic, status (H)      Take 3 mg PO q AM and 2 mg PO q PM.  Quantity: 60 capsule  Refills: 0     tamsulosin 0.4 MG capsule  Commonly known as: FLOMAX  Used for: Heart transplant, orthotopic, status (H)      Dose: 0.4 mg  Take 1 capsule (0.4 mg) by mouth daily  Quantity: 60 capsule  Refills: 0         * This list has 2 medication(s) that are the same as other medications prescribed for  you. Read the directions carefully, and ask your doctor or other care provider to review them with you.            CONTINUE these medicines which have NOT CHANGED       Dose / Directions   Alcohol Swabs Pads  Used for: Heart transplant, orthotopic, status (H)      Use to swab the area of the injection or sergio as directed   Per insurance coverage  Quantity: 100 each  Refills: 0     blood glucose monitoring lancets  Used for: Type 2 diabetes mellitus with diabetic nephropathy, with long-term current use of insulin (H)      Use to test blood sugars 2 times daily.  Quantity: 200 each  Refills: 3     blood glucose monitoring meter device kit  Used for: Type 2 diabetes mellitus with diabetic nephropathy, with long-term current use of insulin (H)      Use to test blood sugar 3-4 times daily, as directed.  Quantity: 1 kit  Refills: 0     blood glucose test strip  Commonly known as: NO BRAND SPECIFIED  Used for: Type 2 diabetes mellitus with diabetic nephropathy, with long-term current use of insulin (H)      Use to test blood sugar 2 times daily or as directed.  Quantity: 200 strip  Refills: 3     * FreeStyle Navjot 14 Day Sensor Misc  Used for: Type 2 diabetes mellitus with hyperglycemia, with long-term current use of insulin (H)      Change every 14 days.  Quantity: 6 each  Refills: 4     * FreeStyle Navjot 14 Day Sensor Misc  Used for: Type 2 diabetes mellitus with hyperglycemia, with long-term current use of insulin (H)      Change every 14 days.  Quantity: 2 each  Refills: 11     * insulin pen needle 32G X 4 MM miscellaneous  Commonly known as: 32G X 4 MM  Used for: Heart transplant, orthotopic, status (H)      Use as directed by provider  Per insurance coverage  Quantity: 100 each  Refills: 0     * NovoFine 30G X 8 MM miscellaneous  Used for: Type 2 diabetes mellitus with hyperglycemia, with long-term current use of insulin (H)  Generic drug: insulin pen needle      Dose: 1 Box  Inject 1 Box Subcutaneous 6 times daily  Use 6 pen needles daily or as directed.  Quantity: 200 each  Refills: 3     * B-D U/F 31G X 5 MM miscellaneous  Used for: Type 2 diabetes mellitus with diabetic nephropathy, with long-term current use of insulin (H)  Generic drug: insulin pen needle      Dose: 1 Units  1 Units by Device route 2 times daily Use 2 pen needles daily or as directed.  Quantity: 100 each  Refills: 3     order for DME      Auto CPAP 8-15 cmH20  Quantity: 1 Units  Refills: 0         * This list has 5 medication(s) that are the same as other medications prescribed for you. Read the directions carefully, and ask your doctor or other care provider to review them with you.                  Protect others around you: Learn how to safely use, store and throw away your medicines at www.disposemymeds.org.       Follow-ups after your visit       Your next 10 appointments already scheduled    Oct 12, 2020  9:00 AM  Procedure - 2.5 hour with U2A ROOM 8  [facility] [address] [place] ([facility]) [address] [address]      Oct 12, 2020  Procedure with John Stuart MD  [facility] ([facility]) [address] [address] [phone]   The [facility] is located on the corner of [address] and [address] on the [facility]. It is easily accessible from virtually any point in the [facility] area, via [address] and [address].   Oct 13, 2020  2:30 PM  (Arrive by 2:15 PM)  Video Visit with Marisabel Prieto PA-C  [facility] [place] ([facility]) [address]  3rd Floor  [facility] [id number] [phone]   [facility]  Note: this is not an onsite visit; there is no need to come to the facility.  Please  have a list of all current medications available for appointment.      Oct 14, 2020 10:00 AM  Cardiac Treatment with Ur Cardiac Rehab 1  [facility] [place] ([facility]) [facility] [address] 1st [address]  [facility] [address]  [phone]      Oct 16, 2020 10:00 AM  Cardiac Treatment with Ur Cardiac Rehab 1  [facility] [place] ([facility]) [address] [place] [address] 1st Floor [address]  [facility] [address]  [phone]      Oct 16, 2020  2:00 PM  (Arrive by 1:45 PM)  Post-Op with UC CVTS  [facility] [place] ([facility] [place]) [address] [place] [address]  [phone]      Oct 19, 2020 10:00 AM  Cardiac Treatment with Ur Cardiac Rehab 1  [facility] [place] ([facility]) [address] [place] [address] 1st Floor [address]  [facility] [address]  [phone]      Oct 21, 2020 10:00 AM  Cardiac Treatment with Ur Cardiac Rehab 1  [facility] [place] ([facility]) [address] [place] [address] 1st Floor [address]  [facility] [address]  [phone]      Oct 21, 2020  2:00 PM  Telephone Visit with Uriel Hopkins MD  [facility] ([facility]) [facility] [address]  [address]  [facility] [address]  [phone]   [facility]  Note: this is not an onsite visit; there is no need to come to the facility.  Please have a list of all current medications available for appointment.      Nov 10, 2020  Procedure with Cardiologist Invasive,  MD RUI[facility] ([facility]) [address] [address] [phone]   The [facility] is located on the corner of [address] on the [facility]. It is easily accessible from virtually any point in the [facility] area, via [address] and [address].      Care Instructions       Further instructions from your care team       [facility]                        Interventional Cardiology  Discharge Instructions   Post Right Heart Cath and Biopsy      AFTER YOU GO HOME:    DO drink plenty of fluids    DO resume your regular diet and medications unless otherwise instructed by your Primary Physician    Do Not scrub the procedure site vigorously    No lotion or powder to the puncture site for 3 days    CALL YOUR PRIMARY PHYSICIAN IF: You may resume all normal activity.  Monitor neck site for bleeding, swelling, or voice changes. If you notice bleeding or swelling immediately apply pressure to the site and call number below to speak with Cardiology Fellow.  If you experience any changes in your breathing you should call your doctor immediately or come to the closest Emergency Department.  Do not drive yourself.    ADDITIONAL INSTRUCTIONS: Medications: You are to resume all home medications including anticoagulation therapy unless otherwise advised by your primary cardiologist or nurse coordinator.    Follow Up: Per your primary cardiology team    If you have any questions or concerns regarding your procedure site please call [phone] at anytime and ask for Cardiology Fellow on call.  They are available 24 hours a day.  You may also contact the Cardiology Clinic after hours number at [phone].                                                       Telephone Numbers [phone] Monday-Friday 8:00 am to 4:30 pm     [phone] After 4:30 pm Monday-Friday, Weekends & Holidays  Ask for Interventional Cardiologist on call. Someone is on call 24 hours/day   [facility] toll free number [phone] Monday-Friday 8:00 am to 4:30 pm   [facility] Emergency Dept [phone]                   Additional Information About Your Visit       [employer] Information    [employer] gives you secure access to your electronic health record. If you see a primary care provider, you can also send messages to your care team and make appointments. If you have questions, please call your primary care clinic.  If you do not have a primary care provider, please call [phone] and they will assist you.       Care EveryWhere ID    This is your Care EveryWhere ID. This could be used by other organizations to access your [facility] medical records  [id number]        Primary Care Provider Office Phone # Fax #    [place] Chrissy Hatfield [phone]      Equal Access to Services    MARIBETH RUSH AH: Hadii rajani escamilla hadasho Soomaali, waaxda luqadaha, qaybta kaalmada adeegyada, joana pulido. So [facility] [phone].    ATENCIÓN: Si habla español, tiene a lainez disposición servicios gratuitos de asistencia lingüística. Llame al [phone].    We comply with applicable federal and state civil rights laws, including the [place] Human Rights Act. We do not discriminate on the basis of race, color, creed, Cheondoism, national origin, marital status, age, disability, sex, sexual orientation, or gender identity.       Thank you!    Thank you for choosing [facility] for your care. Our goal is always to provide you with excellent care. Hearing back from our patients is one way we can continue to improve our services. Please take a few minutes to complete the written survey that you may receive in the mail after you visit with us. Thank you!            Medication List      Medications          Morning Afternoon Evening Bedtime As Needed     Alcohol Swabs Pads  INSTRUCTIONS: Use to swab the area of the injection or sergio as directed   Per insurance coverage                     blood glucose monitoring lancets  INSTRUCTIONS: Use to test blood sugars 2 times daily.                     blood glucose monitoring meter device kit  INSTRUCTIONS: Use to test blood sugar 3-4 times daily, as directed.                     blood glucose test strip  Also known as: NO BRAND SPECIFIED  INSTRUCTIONS: Use to test blood sugar 2 times daily or as directed.                     * FreeStyle Navjot 14 Day Sensor Misc  INSTRUCTIONS: Change every 14 days.  Doctor's comments: Use to check BG 5 times daily.                     * FreeStyle Navjot 14 Day Sensor Misc  INSTRUCTIONS: Change every 14 days.                     * insulin pen needle 32G X 4 MM miscellaneous  Also known as: 32G X 4 MM  INSTRUCTIONS: Use as directed by provider  Per insurance coverage                     * NovoFine 30G X 8 MM miscellaneous  INSTRUCTIONS: Inject 1 Box Subcutaneous 6 times daily Use 6 pen needles daily or as directed.  Doctor's comments: Has both Novolog Flex pen and several Humalog Kwik pen, but doesn't have correct needle.  Generic drug: insulin pen needle                     * B-D U/F 31G X 5 MM miscellaneous  INSTRUCTIONS: 1 Units by Device route 2 times daily Use 2 pen needles daily or as directed.  Generic drug: insulin pen needle                     order for DME  INSTRUCTIONS: Auto CPAP 8-15 cmH20                        * This list has 5 medication(s) that are the same as other medications prescribed for you. Read the directions carefully, and ask your doctor or other care provider to review them with you.            ASK your doctor about these medications          Morning Afternoon Evening Bedtime As Needed    acetaminophen 325 MG tablet  Also known as: TYLENOL  INSTRUCTIONS: Take 3 tablets (975 mg) by mouth every 8 hours as needed for mild pain                     aspirin 81 MG  chewable tablet  Also known as: ASA  INSTRUCTIONS: Take 1 tablet (81 mg) by mouth daily                     calcium carbonate 600 mg-vitamin D 400 units 600-400 MG-UNIT per tablet  Also known as: CALTRATE  INSTRUCTIONS: Take 1 tablet by mouth 2 times daily (with meals)                     ciprofloxacin 500 MG tablet  Also known as: CIPRO  INSTRUCTIONS: Take 1 tablet (500 mg) by mouth every 12 hours for 26 days  Reason for med: infection post transplant                     clotrimazole 10 MG lozenge  Also known as: MYCELEX  INSTRUCTIONS: Place 1 lozenge inside cheek 4 times daily                     fondaparinux ANTICOAGULANT 7.5MG/0.6ML injection  Also known as: ARIXTRA  INSTRUCTIONS: Inject 0.6 mLs (7.5 mg) Subcutaneous every 24 hours Hold dose on the mornings of your right heart catheterization and biopsies                     hydrALAZINE 100 MG tablet  Also known as: APRESOLINE  INSTRUCTIONS: Take 1 tablet (100 mg) by mouth every 8 hours                     insulin aspart 100 UNIT/ML pen  Also known as: NovoLOG PEN  INSTRUCTIONS: Inject 0-31 Units Subcutaneous 3 times daily (with meals) Custom MEAL and SNACK corrective dosing     BG less than 80, do not give Novolog.  BG , give  15 units.  -169, give  17 units.  -199 give 19 units.  -229 give 21 units.  -259 give 23 units.  -289 give 25 units.  -319 give 27 units.  -349 give 29 units.  BG >/= 350 give 31 units.  To be given with meal based on pre-meal blood glucose                     * insulin isophane human 100 UNIT/ML injection  Also known as: HumuLIN N PEN  INSTRUCTIONS: Inject 8 Units Subcutaneous At Bedtime                     * insulin isophane human 100 UNIT/ML injection  Also known as: HumuLIN N PEN  INSTRUCTIONS: Inject 40 Units Subcutaneous every morning                     magnesium oxide 400 MG Caps  INSTRUCTIONS: Take 400 mg by mouth 2 times daily                     mycophenolate 250 MG capsule  Also  known as: GENERIC EQUIVALENT  INSTRUCTIONS: Take 2 capsules (500 mg) by mouth 2 times daily                     oxyCODONE 5 MG tablet  Also known as: ROXICODONE  INSTRUCTIONS: Take 1 tablet (5 mg) by mouth every 4 hours as needed for moderate to severe pain                     pantoprazole 40 MG EC tablet  Also known as: PROTONIX  INSTRUCTIONS: Take 1 tablet (40 mg) by mouth every morning (before breakfast)                     polyethylene glycol 17 g packet  Also known as: MIRALAX  INSTRUCTIONS: 17 g by Oral or Feeding Tube route daily as needed for constipation                     predniSONE 5 MG tablet  Also known as: DELTASONE  INSTRUCTIONS: Take 1 tablet (5 mg) by mouth every evening                     rosuvastatin 10 MG tablet  Also known as: CRESTOR  INSTRUCTIONS: Take 1 tablet (10 mg) by mouth daily                     senna-docusate 8.6-50 MG tablet  Also known as: SENOKOT-S/PERICOLACE  INSTRUCTIONS: Take 1 tablet by mouth 2 times daily (hold for loose stools)                     sodium hypochlorite 0.5 % external solution  Also known as: DAKINS  INSTRUCTIONS: Apply topically 3 times daily with dressing changes                     sulfamethoxazole-trimethoprim 400-80 MG tablet  Also known as: BACTRIM  INSTRUCTIONS: Take 1 tablet by mouth daily  Reason for med: Preventative Medication Therapy Used Around Surgery                     tacrolimus 1 MG capsule  Also known as: GENERIC EQUIVALENT  INSTRUCTIONS: Take 3 mg PO q AM and 2 mg PO q PM.                     tamsulosin 0.4 MG capsule  Also known as: FLOMAX  INSTRUCTIONS: Take 1 capsule (0.4 mg) by mouth daily                        * This list has 2 medication(s) that are the same as other medications prescribed for you. Read the directions carefully, and ask your doctor or other care provider to review them with you.

## 2020-10-12 NOTE — PROGRESS NOTES
ADULT HEART TRANSPLANT CLINIC    HPI:   Mr. Tee Henderson is a 64 year old male w/ICM now s/p OHT 7/20/2020 c/b donor strep salvarius bacteremia, CAD s/p CABG in 2008, DM Type II, HIT, RUE DVT, urinary retention who presents to clinic today for routine heart transplant follow-up. He underwent hospitalization from 9/22 for 10/5 for Left subclavian surgical wound hematoma with infection and abdominal wound infection. He presents for routine biopsy to 2A today.     He is feeling well. He notes improvement to LE edema. He continues to undergo would vac changes per [facility] RN and notes improvement to his abdominal wound. He denies fever, chills, oral lesions, lightheadedness, dizziness, chest pain, palpitations, SOB, ALBRIGHT, PND, orthopnea, nausea, vomiting, and diarrhea. BG-, no more than 200 at home.     TRANSPLANT MEDICATIONS:  Immunosuppression: Prednisone 5 mg po daily. Tac 3 mg po in AM and 2.5 mg in the evening. Goal 8-10 Mycophenolate 500 mg po BID  Prophylaxis: Bactrim, Toxo D +.   Immunosuppression reduced in setting of infection.      LAST BIOPSY: Pending today   LAST ANGIOGRAM: Delayed due to transient renal function   Serostatus: CMV: D+/R+, EBV: D+/R+, Toxo: D+/R-  Intolerance to medications: None  Rejection history: None    PAST MEDICAL HISTORY:  Past Medical History:   Diagnosis Date     CAD (coronary artery disease)      CHF (congestive heart failure) (H)      CKD (chronic kidney disease), stage III      Cortical cataract of both eyes      Diabetes (H)      Hyperlipidemia      Hypertension      Ischemic cardiomyopathy      Obesity      CHANTEL (obstructive sleep apnea)     occas cpap     Osteoarthritis        FAMILY HISTORY:  Family History   Problem Relation Age of Onset     Diabetes Brother      Diabetes Sister      Diabetes Sister      Macular Degeneration No family hx of      Glaucoma No family hx of      Myocardial Infarction No family hx of      Kidney Disease No family hx of        SOCIAL  HISTORY:  Social History     Socioeconomic History     Marital status:      Spouse name: None     Number of children: 4     Years of education: None     Highest education level: None   Occupational History     Occupation:      Employer: [employer]     Employer: RETIRED   Social Needs     Financial resource strain: None     Food insecurity     Worry: None     Inability: None     Transportation needs     Medical: None     Non-medical: None   Tobacco Use     Smoking status: Never Smoker     Smokeless tobacco: Never Used     Tobacco comment: Never smoked; non-smoking household   Substance and Sexual Activity     Alcohol use: No     Alcohol/week: 0.0 standard drinks     Drug use: No     Sexual activity: Yes     Partners: Female   Lifestyle     Physical activity     Days per week: None     Minutes per session: None     Stress: None   Relationships     Social connections     Talks on phone: None     Gets together: None     Attends Bahai service: None     Active member of club or organization: None     Attends meetings of clubs or organizations: None     Relationship status: None     Intimate partner violence     Fear of current or ex partner: None     Emotionally abused: None     Physically abused: None     Forced sexual activity: None   Other Topics Concern     Parent/sibling w/ CABG, MI or angioplasty before 65F 55M? No   Social History Narrative     None       CURRENT MEDICATIONS:  No current facility-administered medications for this encounter.      Current Outpatient Medications   Medication     aspirin (ASA) 81 MG chewable tablet     calcium carbonate 600 mg-vitamin D 400 units (CALTRATE) 600-400 MG-UNIT per tablet     ciprofloxacin (CIPRO) 500 MG tablet     fondaparinux ANTICOAGULANT (ARIXTRA) 7.5MG/0.6ML injection     hydrALAZINE (APRESOLINE) 100 MG tablet     insulin aspart (NOVOLOG PEN) 100 UNIT/ML pen     insulin isophane human (HUMULIN N PEN) 100 UNIT/ML injection     insulin  isophane human (HUMULIN N PEN) 100 UNIT/ML injection     insulin pen needle (B-D U/F) 31G X 5 MM miscellaneous     insulin pen needle (NOVOFINE) 30G X 8 MM miscellaneous     magnesium oxide 400 MG CAPS     mycophenolate (GENERIC EQUIVALENT) 250 MG capsule     pantoprazole (PROTONIX) 40 MG EC tablet     rosuvastatin (CRESTOR) 10 MG tablet     sulfamethoxazole-trimethoprim (BACTRIM) 400-80 MG tablet     tacrolimus (GENERIC EQUIVALENT) 1 MG capsule     tamsulosin (FLOMAX) 0.4 MG capsule     acetaminophen (TYLENOL) 325 MG tablet     Alcohol Swabs PADS     blood glucose (NO BRAND SPECIFIED) test strip     blood glucose monitoring (ACCU-CHEK FELIPE SMARTVIEW) meter device kit     blood glucose monitoring (ONE TOUCH DELICA) lancets     Continuous Blood Gluc Sensor (FREESTYLE JEREMY 14 DAY SENSOR) MISC     Continuous Blood Gluc Sensor (FREESTYLE JEREMY 14 DAY SENSOR) MISC     furosemide (LASIX) 20 MG tablet     insulin pen needle (32G X 4 MM) 32G X 4 MM miscellaneous     order for DME     oxyCODONE (ROXICODONE) 5 MG tablet     polyethylene glycol (MIRALAX) 17 g packet     senna-docusate (SENOKOT-S/PERICOLACE) 8.6-50 MG tablet     sodium hypochlorite (DAKINS) 0.5 % external solution       ROS:   CONSTITUTIONAL: Denies fever, chills, fatigue, or weight fluctuations.   HEENT: Denies headache, vision changes, and changes in speech.   CV: Refer to HPI.   PULMONARY:Denies shortness of breath, cough, or previous TB exposure.   GI:Denies nausea, vomiting, diarrhea, and abdominal pain. Bowel movements are regular.   :Denies urinary alterations, dysuria, urinary frequency, hematuria, and abnormal drainage.   EXT: Complains of lower extremity edema.   SKIN:Denies abnormal rashes or lesions.   MUSCULOSKELETAL:Denies upper or lower extremity weakness and pain.   NEUROLOGIC:Denies lightheadedness, dizziness, seizures, or upper or lower extremity paresthesia.     EXAM:  BP (!) 149/68 (BP Location: Right arm)   Pulse 113   Temp 98  F (36.7   C) (Oral)   Resp 16   Wt 78.9 kg (174 lb)   SpO2 99%   BMI 28.96 kg/m    GENERAL: Appears alert and oriented times three.   HEENT: Eye symmetrical and free of discharge bilaterally. Mucous membranes moist and without lesions.  NECK: Supple and without lymphadenopathy. JVD 8 cm.   CV: RRR, S1S2 present without murmur, rub, or gallop.   RESPIRATORY: Respirations regular, even, and unlabored. Lungs CTA throughout.   GI: Soft and non distended with normoactive bowel sounds present in all quadrants. No tenderness, rebound, guarding. No organomegaly.   EXTREMITIES: +2 bilateral LE peripheral edema. 2+ bilateral pedal pulses.   NEUROLOGIC: Alert and orientated x 3. CN II-XII grossly intact. No focal deficits.   MUSCULOSKELETAL: No joint swelling or tenderness.   SKIN: No jaundice. No rashes or lesions. Abdominal wound with scant drainage. ICD incision to wound vac with minimal drainage.     Labs:  CBC RESULTS:  Lab Results   Component Value Date    WBC 8.6 10/12/2020    RBC 2.97 (L) 10/12/2020    HGB 9.0 (L) 10/12/2020    HCT 30.2 (L) 10/12/2020     (H) 10/12/2020    MCH 30.3 10/12/2020    MCHC 29.8 (L) 10/12/2020    RDW 20.0 (H) 10/12/2020     10/12/2020       CMP RESULTS:  Lab Results   Component Value Date     10/12/2020    POTASSIUM 4.4 10/12/2020    CHLORIDE 108 10/12/2020    CO2 21 10/12/2020    ANIONGAP 9 10/12/2020    GLC 90 10/12/2020    BUN 35 (H) 10/12/2020    CR 1.89 (H) 10/12/2020    GFRESTIMATED 36 (L) 10/12/2020    GFRESTBLACK 42 (L) 10/12/2020    BRYANNA 8.5 10/12/2020    BILITOTAL 0.2 10/12/2020    ALBUMIN 2.8 (L) 10/12/2020    ALKPHOS 119 10/12/2020    ALT 20 10/12/2020    AST 23 10/12/2020        INR RESULTS:  Lab Results   Component Value Date    INR 1.06 10/12/2020       LIPID RESULTS:  Lab Results   Component Value Date    CHOL 118 08/17/2020    HDL 75 08/17/2020    LDL 26 08/17/2020    TRIG 82 08/17/2020    CHOLHDLRATIO 2.6 06/27/2015       IMMUNOSUPPRESSANT LEVELS  Lab Results    Component Value Date    TACROL 9.1 10/08/2020    DOSTAC 1,215 10/08/2020       No components found for: CK  Lab Results   Component Value Date    MAG 1.6 10/12/2020     Lab Results   Component Value Date    A1C 8.2 (H) 07/03/2020     Lab Results   Component Value Date    PHOS 3.2 10/12/2020     Lab Results   Component Value Date    NTBNP 6,187 (H) 11/25/2019     Lab Results   Component Value Date    SAITESTMET [facility] 08/17/2020    SAICELL Class I 08/17/2020    LJ5GCESUV None 08/17/2020    LU0APQPDDO B:64 08/17/2020    SAIREPCOM  08/17/2020      Test performed by modified procedure. Serum heat inactivated and tested   by a modified ([place]) protocol including fetal calf serum addition.   High-risk, mfi >3,000. Mod-risk, mfi 500-3,000.       Lab Results   Component Value Date    SAIITESTME [facility] 08/17/2020    SAIICELL Class II 08/17/2020    EB5EOATDY None 08/17/2020    DA3HEKEHIB DR:11 08/17/2020    SAIIREPCOM  08/17/2020      Test performed by modified procedure. Serum heat inactivated and tested   by a modified ([place]) protocol including fetal calf serum addition.   High-risk, mfi >3,000. Mod-risk, mfi 500-3,000.       Lab Results   Component Value Date    CSPEC EDTA PLASMA 10/08/2020       Diagnostic Studies:  EMB 10/12/20: Pending    Echo 10/12/20:  Interpretation Summary  Patient s/p heart transplant on 07/19/2020. Patient tachycardic during  examination at approximately 100 bpm.     Left ventricular function, chamber size, wall motion, and wall thickness are  normal.The EF is 55-60%.  Global right ventricular function is normal. The right ventricle is normal  size.  Trace tricuspid insufficiency is present.  PA systolic pressure could not be assessed.  No pericardial effusion.  This study was compared with the study from 9/23/20. There has been no change.    Assessment and Plan:   Mr. Tee Henderson is a 64 year old male w/ICM now s/p OHT 7/20/2020 c/b donor strep salvarius bacteremia, CAD s/p CABG in 2008, DM  Type II, HIT, RUE DVT, urinary retention who presents to clinic today for routine heart transplant follow-up. He was hospitalized for left subclavian surgical wound hematoma with infection and abdominal wound infection. He presents for routine biopsy today and is feeling well. His BP is mildly elevated with mild bump in Cr.      Left ICD pocket hematoma complicated by Pseudomonas infection. Blood culture positive for Cutibacterium, contaminant per ID. S/p left surgical wound debridement at bedside per CVTS, culture positive for Pseudomonas. Abdominal wound culture positive for strep mitis and Pseudomonas.   - Appreciate ID management.   - Continue Cipro 500 mg po BID  - Wound vac per CVTS.      Status post Heart Transplantation on 7/20/20 due to ICM. Primary graft dysfunction, resolved.  Echo 9/23 with EF 55-60%, normal RV function, and no effusion. RHC 9/28 with mRA-8, RV-44/10, mPA-30, mPCWP-20, LIO CI-2.84 and LIO CO-6.34, biopsy negative.   Immunosuppression: Tac 3/2 with level pending (goal 8-10 in setting of infection). Mycophenolate on hold (prior dose 750 mg po BID). Prednisone 5 mg po daily. Reduce per biopsy  Serostatus: CMV: D+/R+, EBV: D+/R+, Toxo: D+/R-  Prophylaxis: Continue Bactrim given D+ Toxo and Nystatin. Valcyte discontinued in setting of Leukopenia with Anemia, weekly CMV DNA quant, pending today.   - Continue ASA and Crestor.  Dermatology evaluation: Annually   Opthalmology evaluation: Annually     Leukopenia and Anemia. Prior Hematology consult 8/25 notes anemia multifactorial secondary to acute illness, inflammation, drug-related (new immunosuppressants particularly the MMF, Bactrim) and renal dysfunction. It was recommended by hematology at that time to continue fondaparinux for recent line associated RIJ DVT in the setting of HIT with duration of Fondaparinux for 3 months. Received low dose Neupogen inpatient.  -  WBC-8.6 and Hgb 9.      LE edema. Filling pressures stable per RHC 9/28.  -  Encouraged protein intake, TEDS, and activity.   - Lymphedema consult.   - Initiate Lasix 20 mg po daily per Dr. Sheridan.      HTN.   - Hydralazine to 100 mg po TID.    - BP today 149/78.   - 1 week BP log at home given recent increase inpatient.      RIJ and RUE DVT, provoked. US 7/22/20 consistent with nonocclusive thrombus in the right internal jugular vein along the intravenous catheter demonstrated, nonocclusive thrombus along the right PICC line in the right axillary vein demonstrated, and occlusive thrombus in the superficial right cephalic vein demonstrated as above. Follow up US negative 9/24.  - Given HIT history continue Fondaparinux for 3 months.     History of Donor Streptococcus salivarius bacteremia  - Transplant ID consult 7/21, treated with ceftriaxone. Course complete.    Follow up BMP and CBC in 1 week. 1 week BP log. Follow up in Cardiology clinic 11/10/20.     ELIJAH Arndt CNP  10/12/2020          CC  SHONDA SHERIDAN

## 2020-10-12 NOTE — TELEPHONE ENCOUNTER
Pt called using .  Pt start taking 20mg of Lasix every morning.  Pt's daughter, also called with above information.  Both pt and daughter state understanding.

## 2020-10-12 NOTE — RESULT ENCOUNTER NOTE
CMV negative, BMP and CBC baseline for pt.  Tacro level 9.1.  Goal 8-10.  No dose change at this time.  Results called to pt.

## 2020-10-12 NOTE — PROGRESS NOTES
Patient returned to 2A post RHC and biopsy.  VSS.  Denies pain.  Right neck site C/D/I, no hematoma.  Patient declined po food and fluids.  Patient appropriate for immediate discharge.  Discharge instructions reviewed with pre-procedure RN and copy given to patient.  Patient with no further questions/concerns.  Patient to discharge independently from hospital to home with wife.

## 2020-10-12 NOTE — IP AVS SNAPSHOT
[facility] [address] [address]                                    After Visit Summary   10/12/2020    Lucian Henderson     MRN: [id number]           After Visit Summary Signature Page    I have received my discharge instructions, and my questions have been answered. I have discussed any challenges I see with this plan with the nurse or doctor.    ..........................................................................................................................................  Patient/Patient Representative Signature      ..........................................................................................................................................  Patient Representative Print Name and Relationship to Patient    ..................................................               ................................................  Date                                   Time    ..........................................................................................................................................  Reviewed by Signature/Title    ...................................................              ..............................................  Date                                               Time          22EPIC Rev 08/18

## 2020-10-12 NOTE — PROGRESS NOTES
"Outcome for 10/12/20 1:03 PM :Glucose data obtained via Phone and Located in Table Below     Lucian Henderson Sr. is a 64 year old male who is being evaluated via a billable video visit.      The patient has been notified of following:     \"This video visit will be conducted via a call between you and your physician/provider. We have found that certain health care needs can be provided without the need for an in-person physical exam.  This service lets us provide the care you need with a video conversation.  If a prescription is necessary we can send it directly to your pharmacy.  If lab work is needed we can place an order for that and you can then stop by our lab to have the test done at a later time.    Video visits are billed at different rates depending on your insurance coverage.  Please reach out to your insurance provider with any questions.    If during the course of the call the physician/provider feels a video visit is not appropriate, you will not be charged for this service.\"    Patient has given verbal consent for Video visit? Yes  How would you like to obtain your AVS? MyChart  If you are dropped from the video visit, the video invite should be resent to: Text to cell phone: [phone]  Will anyone else be joining your video visit? No        Video-Visit Details    Type of service:  Video Visit    Video Start Time: 2:30 PM  Video End Time: 2:49 PM    Originating Location (pt. Location): Home    Distant Location (provider location):  [facility] ENDOCRINOLOG[facility]     Platform used for Video Visit: Other: Whats Gaby per patient request        Diabetes Virtual Consult Note  Marisabel Prieto PA-C  October 13, 2020      Lucian Henderson Sr. is a 64 year old male with a past medical history including  has a past medical history of CAD (coronary artery disease), CHF (congestive heart failure) (H), CKD (chronic kidney disease), stage III, Cortical cataract of both eyes, " Diabetes (H), Hyperlipidemia, Hypertension, Ischemic cardiomyopathy, Obesity, CHANTEL (obstructive sleep apnea), and Osteoarthritis. He also has no past medical history of Chronic infection, Complication of anesthesia, COPD (chronic obstructive pulmonary disease) (H), Heart murmur, Irregular heart beat, Liver disease, Other chronic pain, or Uncomplicated asthma.    Lucian was again admitted to [facility] for hyperglycemia 9/22 - 10/5/2020.    Recently hospitalized due to high BG.  Hospital discharge notes Increased BS since transplant due to prednisone. Controlled during index admission. Recently increase by NPH as outpatient. No evidence of DKA or HHS on admission. No evidence of infection or acute illness driving hyperglycemia. Negative ketones, UA with glucose.  Diabetes education met w patient and his wife - appears elevated BS were due to error in insulin administration. See their note for details. BS controlled day of discharge on NPH 35 units qAM. D/c'ed on this dose and Novolog 15 units with each meal plus sliding scale as following:     -  insulin aspart (NOVOLOG PEN) 100 UNIT/ML pen  Inject 0-31 Units Subcutaneous 3 times daily (with meals) Custom MEAL and SNACK corrective dosing     BG less than 80, do not give Novolog.  BG , give  15 units.  -169, give  17 units.  -199 give 19 units.  -229 give 21 units.  -259 give 23 units.  -289 give 25 units.  -319 give 27 units.  -349 give 29 units.  BG >/= 350 give 31 units.  To be given with meal based on pre-meal blood glucose     - NPH 8 units qpm   - NPH 40 units qam   - Prednisone 5 mg qam       They are giving 35 unit each morning.  They are also giving 15 - 21 units Novolog before each meal with sliding scale but are worried blood glucose is high, particularly in the morning,  The do not understand why as Lucian tells me dinner is his smallest meal.  He has just bread and coffee.  They are worried it is because he has  none of his DM pills.     Otherwise he is feeling well, getting stronger, moving a bit more.  They deny any symptoms or episodes of low blood sugar and while he worries BG is high, he really does not feel too much differently with higher BG, maybe a bit more tired.        We reviewed glucometer, pump and CGMS data together.  It revealed:    Glucose Readings:  Week of__/__/__ Date  _10_/_12_  Date  _10_/_11_ Date  _10_/_10_ Date  _10_/_09_ Date  __/_8_ Date  __/_7_ Date  10__/_06_    Time BG Time BG Time BG Time BG Time BG Time BG Time BG     83  117  190  157  103  140  150     90  120  188  139  130  155  130     110  125  155  140  128  135                              History of Diabetes monitoring and complications/ prevention:  CAD: Yes  Last eye exam results: 09/18/2018  Last dental exam: not discussed today.  Microalbuminuria: 6 October 2018  HTN: Yes  On Statin: Yes  On Aspirin: Yes  Depression: No - worried, but happy. Hopeful.    ED: yes, limited concerned at this time     Janice  has a past medical history of CAD (coronary artery disease), CHF (congestive heart failure) (H), CKD (chronic kidney disease), stage III, Cortical cataract of both eyes, Diabetes (H), Hyperlipidemia, Hypertension, Ischemic cardiomyopathy, Obesity, CHANTEL (obstructive sleep apnea), and Osteoarthritis. He also has no past medical history of Chronic infection, Complication of anesthesia, COPD (chronic obstructive pulmonary disease) (H), Heart murmur, Irregular heart beat, Liver disease, Other chronic pain, or Uncomplicated asthma.  Current Outpatient Medications   Medication     Alcohol Swabs PADS     aspirin (ASA) 81 MG chewable tablet     blood glucose (NO BRAND SPECIFIED) test strip     blood glucose monitoring (ACCU-CHEK FELIPE SMARTVIEW) meter device kit     blood glucose monitoring (ONE TOUCH DELICA) lancets     calcium carbonate 600 mg-vitamin D 400 units (CALTRATE) 600-400 MG-UNIT per tablet     ciprofloxacin (CIPRO) 500 MG  tablet     clotrimazole (MYCELEX) 10 MG lozenge     Continuous Blood Gluc Sensor (FREESTYLE JEREMY 14 DAY SENSOR) MISC     Continuous Blood Gluc Sensor (FREESTYLE JEREMY 14 DAY SENSOR) MISC     fondaparinux ANTICOAGULANT (ARIXTRA) 7.5MG/0.6ML injection     hydrALAZINE (APRESOLINE) 100 MG tablet     insulin aspart (NOVOLOG PEN) 100 UNIT/ML pen     insulin isophane human (HUMULIN N PEN) 100 UNIT/ML injection     insulin isophane human (HUMULIN N PEN) 100 UNIT/ML injection     insulin pen needle (32G X 4 MM) 32G X 4 MM miscellaneous     insulin pen needle (B-D U/F) 31G X 5 MM miscellaneous     insulin pen needle (NOVOFINE) 30G X 8 MM miscellaneous     magnesium oxide 400 MG CAPS     mycophenolate (GENERIC EQUIVALENT) 250 MG capsule     order for DME     pantoprazole (PROTONIX) 40 MG EC tablet     predniSONE (DELTASONE) 5 MG tablet     rosuvastatin (CRESTOR) 10 MG tablet     sodium hypochlorite (DAKINS) 0.5 % external solution     sulfamethoxazole-trimethoprim (BACTRIM) 400-80 MG tablet     tacrolimus (GENERIC EQUIVALENT) 1 MG capsule     tamsulosin (FLOMAX) 0.4 MG capsule     acetaminophen (TYLENOL) 325 MG tablet     furosemide (LASIX) 20 MG tablet     oxyCODONE (ROXICODONE) 5 MG tablet     polyethylene glycol (MIRALAX) 17 g packet     senna-docusate (SENOKOT-S/PERICOLACE) 8.6-50 MG tablet     No current facility-administered medications for this visit.               Lucian's family history includes Diabetes in his brother, sister, and sister.    ROS:   Patient denies any fevers, chills or sweats as well as any changes in or problems with vision, pain or problems with dentition, new or different headaches.  Patient denies symptoms of hypo and hyperglycemia except as above.   Patients denies marked fatigue, cough, shortness of breath, chest pain or pressure.  There has been no pain with or other changes in urination or  itching or pain in genital areas.  Patient denies any noted swelling in feet, ankles or otherwise, loss  "of sensation or pain  in feet or other areas.    Patient also denies current difficulties with depressed mood, anhedonia or worrying too much.        Exam:    PHONE VISIT    Lucian is alerted and oriented.  With Shivani.  Shivani asking about how to calculate dose when blood sugar is high.  She takes notes; scratches out add 2,4, 6 on sliding scale to make 17, 19 21 etc..   Mood is \"good,\" affect is congruent.  Thoughtful form and content are fluid and coherent.  No signs of distress are appreciated.      Data:      Most recent:  Lab Results   Component Value Date    CR 1.77 08/31/2020     Lab Results   Component Value Date     08/31/2020       GFR Estimate   Date Value Ref Range Status   10/12/2020 36 (L) >60 mL/min/[1.73_m2] Final     Comment:     Non  GFR Calc  Starting 12/18/2018, serum creatinine based estimated GFR (eGFR) will be   calculated using the Chronic Kidney Disease Epidemiology Collaboration   (CKD-EPI) equation.     10/08/2020 42 (L) >60 mL/min/[1.73_m2] Final     Comment:     Non  GFR Calc  Starting 12/18/2018, serum creatinine based estimated GFR (eGFR) will be   calculated using the Chronic Kidney Disease Epidemiology Collaboration   (CKD-EPI) equation.     10/05/2020 43 (L) >60 mL/min/[1.73_m2] Final     Comment:     Non  GFR Calc  Starting 12/18/2018, serum creatinine based estimated GFR (eGFR) will be   calculated using the Chronic Kidney Disease Epidemiology Collaboration   (CKD-EPI) equation.       GFR Estimate If Black   Date Value Ref Range Status   10/12/2020 42 (L) >60 mL/min/[1.73_m2] Final     Comment:      GFR Calc  Starting 12/18/2018, serum creatinine based estimated GFR (eGFR) will be   calculated using the Chronic Kidney Disease Epidemiology Collaboration   (CKD-EPI) equation.     10/08/2020 49 (L) >60 mL/min/[1.73_m2] Final     Comment:      GFR Calc  Starting 12/18/2018, serum creatinine based " estimated GFR (eGFR) will be   calculated using the Chronic Kidney Disease Epidemiology Collaboration   (CKD-EPI) equation.     10/05/2020 50 (L) >60 mL/min/[1.73_m2] Final     Comment:      GFR Calc  Starting 12/18/2018, serum creatinine based estimated GFR (eGFR) will be   calculated using the Chronic Kidney Disease Epidemiology Collaboration   (CKD-EPI) equation.           Lab Results   Component Value Date    A1C 8.2 (H) 07/03/2020    A1C 7.9 (H) 07/08/2019    A1C 8.5 (H) 03/11/2019    A1C 7.6 (H) 10/16/2018    A1C 9.8 (H) 09/06/2017    HEMOGLOBINA1 8.3 (A) 08/06/2018    HEMOGLOBINA1 10.6 (A) 04/30/2018     Lab Results   Component Value Date    MICROL 6 10/03/2018     No results found for: MICROALBUMIN  No results found for: CPEPT, GADAB, ISCAB  Cholesterol   Date Value Ref Range Status   08/17/2020 118 <200 mg/dL Final   07/08/2019 104 <200 mg/dL Final     HDL Cholesterol   Date Value Ref Range Status   08/17/2020 75 >39 mg/dL Final   07/08/2019 27 (L) >39 mg/dL Final     LDL Cholesterol Calculated   Date Value Ref Range Status   08/17/2020 26 <100 mg/dL Final     Comment:     Desirable:       <100 mg/dl   07/08/2019 41 <100 mg/dL Final     Comment:     Desirable:       <100 mg/dl     Triglycerides   Date Value Ref Range Status   08/17/2020 82 <150 mg/dL Final   07/08/2019 181 (H) <150 mg/dL Final     Comment:     Borderline high:  150-199 mg/dl  High:             200-499 mg/dl  Very high:       >499 mg/dl       Cholesterol/HDL Ratio   Date Value Ref Range Status   06/27/2015 2.6 0.0 - 5.0 Final   01/11/2015 6.0 (H) 0.0 - 5.0 Final           Assessment/Plan:    Lucian is a 65 year old male with  has a past medical history of CAD (coronary artery disease), CHF (congestive heart failure) (H), CKD (chronic kidney disease), stage III, Cortical cataract of both eyes, Diabetes (H), Hyperlipidemia, Hypertension, Ischemic cardiomyopathy, Obesity, CHANTEL (obstructive sleep apnea), and Osteoarthritis. He  also has no past medical history of Chronic infection, Complication of anesthesia, COPD (chronic obstructive pulmonary disease) (H), Heart murmur, Irregular heart beat, Liver disease, Other chronic pain, or Uncomplicated asthma.    He was recently hospitalized again for hyperglycemia, but BG are now within safe range at home.    Successfully using sliding scale tid before meals.  May need to add qhs >200 if continues to have fasting BG far above goal.  He is reluctant to consider this today.       RTc 2 weeks, urged to contact me sooner of frequent BG >200, any BG<70.      >50% of 30 minute visit spent in counseling, education and coordination of care related to healthy lifestyle, prevention of diabetic complications, options for better glycemic control as well as preventing, detecting, and treating hypoglycemia.      It is my privilege to be involved in the care of the above patient.     Marisabel Prieto PA-C, MPAS  [facility]  Diabetes, Endocrinology, and Metabolism  [phone] Appointments/Nurse  [phone] pager  [phone] nurse line    This note was completed in part using Dragon voice recognition, and may contain word and grammatical errors.

## 2020-10-12 NOTE — PROGRESS NOTES
Pt to unit 2a with wife at bedside for right heart cath and biopsy. Lidocaine cream in place, consenting for procedure at this time.

## 2020-10-12 NOTE — DISCHARGE INSTRUCTIONS
[facility]                        Interventional Cardiology  Discharge Instructions   Post Right Heart Cath and Biopsy      AFTER YOU GO HOME:    DO drink plenty of fluids    DO resume your regular diet and medications unless otherwise instructed by your Primary Physician    Do Not scrub the procedure site vigorously    No lotion or powder to the puncture site for 3 days    CALL YOUR PRIMARY PHYSICIAN IF: You may resume all normal activity.  Monitor neck site for bleeding, swelling, or voice changes. If you notice bleeding or swelling immediately apply pressure to the site and call number below to speak with Cardiology Fellow.  If you experience any changes in your breathing you should call your doctor immediately or come to the closest Emergency Department.  Do not drive yourself.    ADDITIONAL INSTRUCTIONS: Medications: You are to resume all home medications including anticoagulation therapy unless otherwise advised by your primary cardiologist or nurse coordinator.    Follow Up: Per your primary cardiology team    If you have any questions or concerns regarding your procedure site please call [phone] at anytime and ask for Cardiology Fellow on call.  They are available 24 hours a day.  You may also contact the Cardiology Clinic after hours number at [phone].                                                       Telephone Numbers [phone] Monday-Friday 8:00 am to 4:30 pm    [phone] After 4:30 pm Monday-Friday, Weekends & Holidays  Ask for Interventional Cardiologist on call. Someone is on call 24 hours/day   [facility] toll free number [phone] Monday-Friday 8:00 am to 4:30 pm   [facility] Emergency Dept [phone]

## 2020-10-13 ENCOUNTER — VIRTUAL VISIT (OUTPATIENT)
Dept: ENDOCRINOLOGY | Facility: CLINIC | Age: 65
End: 2020-10-13
Payer: COMMERCIAL

## 2020-10-13 DIAGNOSIS — Z94.1 HEART TRANSPLANT, ORTHOTOPIC, STATUS (H): Primary | ICD-10-CM

## 2020-10-13 DIAGNOSIS — E11.3399 MODERATE NONPROLIFERATIVE DIABETIC RETINOPATHY WITHOUT MACULAR EDEMA ASSOCIATED WITH TYPE 2 DIABETES MELLITUS, UNSPECIFIED LATERALITY (H): ICD-10-CM

## 2020-10-13 DIAGNOSIS — Z79.4 TYPE 2 DIABETES MELLITUS WITH HYPERGLYCEMIA, WITH LONG-TERM CURRENT USE OF INSULIN (H): ICD-10-CM

## 2020-10-13 DIAGNOSIS — E11.65 TYPE 2 DIABETES MELLITUS WITH HYPERGLYCEMIA, WITH LONG-TERM CURRENT USE OF INSULIN (H): ICD-10-CM

## 2020-10-13 DIAGNOSIS — E66.01 MORBID OBESITY (H): ICD-10-CM

## 2020-10-13 LAB
CMV DNA SPEC NAA+PROBE-ACNC: NORMAL [IU]/ML
CMV DNA SPEC NAA+PROBE-LOG#: NORMAL {LOG_IU}/ML
COPATH REPORT: NORMAL
DONOR IDENTIFICATION: NORMAL
DSA COMMENTS: NORMAL
DSA PRESENT: NO
DSA TEST METHOD: NORMAL
EBV DNA # SPEC NAA+PROBE: NORMAL {COPIES}/ML
EBV DNA SPEC NAA+PROBE-LOG#: NORMAL {LOG_COPIES}/ML
ORGAN: NORMAL
SA1 CELL: NORMAL
SA1 COMMENTS: NORMAL
SA1 HI RISK ABY: NORMAL
SA1 MOD RISK ABY: NORMAL
SA1 TEST METHOD: NORMAL
SA2 CELL: NORMAL
SA2 COMMENTS: NORMAL
SA2 HI RISK ABY UA: NORMAL
SA2 MOD RISK ABY: NORMAL
SA2 TEST METHOD: NORMAL
SPECIMEN SOURCE: NORMAL
UNACCEPTABLE ANTIGEN: NORMAL
UNOS CPRA: 0

## 2020-10-13 PROCEDURE — 99214 OFFICE O/P EST MOD 30 MIN: CPT | Mod: 95 | Performed by: PHYSICIAN ASSISTANT

## 2020-10-13 NOTE — PATIENT INSTRUCTIONS
Siempre es rogers communicar con Ud.    Me alergra mucho ques esta sentiendo mejor en casa.      Ahorita lainez diabetes esta savannah controlado y les felicito.  Si es que esta >200 dos veces seguidos pido que me lllamen.    It is my privilege to be involved in the care of the above patient.     Marsiabel Prieto PA-C, MPAS  [facility]  Diabetes, Endocrinology, and Metabolism  [phone] Appointments/Nurse - Lllamen brian sonia si se sube el azucar.    [phone] Fax  [phone] pager  [phone] nurse line

## 2020-10-13 NOTE — LETTER
"10/13/2020       RE: Lucian Henderson Sr.  [address] [facility] [address]     Dear Colleague,    Thank you for referring your patient, Lucian Henderson Sr., to the [facility] [place] [facility]. Please see a copy of my visit note below.    Outcome for 10/12/20 1:03 PM :Glucose data obtained via Phone and Located in Table Below     Lucian Henderson Sr. is a 64 year old male who is being evaluated via a billable video visit.      The patient has been notified of following:     \"This video visit will be conducted via a call between you and your physician/provider. We have found that certain health care needs can be provided without the need for an in-person physical exam.  This service lets us provide the care you need with a video conversation.  If a prescription is necessary we can send it directly to your pharmacy.  If lab work is needed we can place an order for that and you can then stop by our lab to have the test done at a later time.    Video visits are billed at different rates depending on your insurance coverage.  Please reach out to your insurance provider with any questions.    If during the course of the call the physician/provider feels a video visit is not appropriate, you will not be charged for this service.\"    Patient has given verbal consent for Video visit? Yes  How would you like to obtain your AVS? MyChart  If you are dropped from the video visit, the video invite should be resent to: Text to cell phone: [phone]  Will anyone else be joining your video visit? No        Video-Visit Details    Type of service:  Video Visit    Video Start Time: 2:30 PM  Video End Time: 2:49 PM    Originating Location (pt. Location): Home    Distant Location (provider location):  [facility] ENDOCRIN[facility]     Platform used for Video Visit: Other: Whats Gaby per patient " request        Diabetes Virtual Consult Note  Marisabel Prieto PA-C  October 13, 2020      Lucian Henderson Sr. is a 64 year old male with a past medical history including  has a past medical history of CAD (coronary artery disease), CHF (congestive heart failure) (H), CKD (chronic kidney disease), stage III, Cortical cataract of both eyes, Diabetes (H), Hyperlipidemia, Hypertension, Ischemic cardiomyopathy, Obesity, CHANTEL (obstructive sleep apnea), and Osteoarthritis. He also has no past medical history of Chronic infection, Complication of anesthesia, COPD (chronic obstructive pulmonary disease) (H), Heart murmur, Irregular heart beat, Liver disease, Other chronic pain, or Uncomplicated asthma.    Lucian was again admitted to [facility] for hyperglycemia 9/22 - 10/5/2020.    Recently hospitalized due to high BG.  Hospital discharge notes Increased BS since transplant due to prednisone. Controlled during index admission. Recently increase by NPH as outpatient. No evidence of DKA or HHS on admission. No evidence of infection or acute illness driving hyperglycemia. Negative ketones, UA with glucose.  Diabetes education met w patient and his wife - appears elevated BS were due to error in insulin administration. See their note for details. BS controlled day of discharge on NPH 35 units qAM. D/c'ed on this dose and Novolog 15 units with each meal plus sliding scale as following:     -  insulin aspart (NOVOLOG PEN) 100 UNIT/ML pen  Inject 0-31 Units Subcutaneous 3 times daily (with meals) Custom MEAL and SNACK corrective dosing     BG less than 80, do not give Novolog.  BG , give  15 units.  -169, give  17 units.  -199 give 19 units.  -229 give 21 units.  -259 give 23 units.  -289 give 25 units.  -319 give 27 units.  -349 give 29 units.  BG >/= 350 give 31 units.  To be given with meal based on pre-meal blood glucose     - NPH 8 units qpm   - NPH 40 units qam   - Prednisone 5  mg qam       They are giving 35 unit each morning.  They are also giving 15 - 21 units Novolog before each meal with sliding scale but are worried blood glucose is high, particularly in the morning,  The do not understand why as Lucian tells me dinner is his smallest meal.  He has just bread and coffee.  They are worried it is because he has none of his DM pills.     Otherwise he is feeling well, getting stronger, moving a bit more.  They deny any symptoms or episodes of low blood sugar and while he worries BG is high, he really does not feel too much differently with higher BG, maybe a bit more tired.        We reviewed glucometer, pump and CGMS data together.  It revealed:    Glucose Readings:  Week of__/__/__ Date  _10_/_12_  Date  _10_/_11_ Date  _10_/_10_ Date  _10_/_09_ Date  __/_8_ Date  __/_7_ Date  10__/_06_    Time BG Time BG Time BG Time BG Time BG Time BG Time BG     83  117  190  157  103  140  150     90  120  188  139  130  155  130     110  125  155  140  128  135                              History of Diabetes monitoring and complications/ prevention:  CAD: Yes  Last eye exam results: 09/18/2018  Last dental exam: not discussed today.  Microalbuminuria: 6 October 2018  HTN: Yes  On Statin: Yes  On Aspirin: Yes  Depression: No - worried, but happy. Hopeful.    ED: yes, limited concerned at this time     Lucian's  has a past medical history of CAD (coronary artery disease), CHF (congestive heart failure) (H), CKD (chronic kidney disease), stage III, Cortical cataract of both eyes, Diabetes (H), Hyperlipidemia, Hypertension, Ischemic cardiomyopathy, Obesity, CHANTEL (obstructive sleep apnea), and Osteoarthritis. He also has no past medical history of Chronic infection, Complication of anesthesia, COPD (chronic obstructive pulmonary disease) (H), Heart murmur, Irregular heart beat, Liver disease, Other chronic pain, or Uncomplicated asthma.  Current Outpatient Medications   Medication     Alcohol Swabs  PADS     aspirin (ASA) 81 MG chewable tablet     blood glucose (NO BRAND SPECIFIED) test strip     blood glucose monitoring (ACCU-CHEK FELIPE SMARTVIEW) meter device kit     blood glucose monitoring (ONE TOUCH DELICA) lancets     calcium carbonate 600 mg-vitamin D 400 units (CALTRATE) 600-400 MG-UNIT per tablet     ciprofloxacin (CIPRO) 500 MG tablet     clotrimazole (MYCELEX) 10 MG lozenge     Continuous Blood Gluc Sensor (FREESTYLE JEREMY 14 DAY SENSOR) MISC     Continuous Blood Gluc Sensor (FREESTYLE JEREMY 14 DAY SENSOR) MISC     fondaparinux ANTICOAGULANT (ARIXTRA) 7.5MG/0.6ML injection     hydrALAZINE (APRESOLINE) 100 MG tablet     insulin aspart (NOVOLOG PEN) 100 UNIT/ML pen     insulin isophane human (HUMULIN N PEN) 100 UNIT/ML injection     insulin isophane human (HUMULIN N PEN) 100 UNIT/ML injection     insulin pen needle (32G X 4 MM) 32G X 4 MM miscellaneous     insulin pen needle (B-D U/F) 31G X 5 MM miscellaneous     insulin pen needle (NOVOFINE) 30G X 8 MM miscellaneous     magnesium oxide 400 MG CAPS     mycophenolate (GENERIC EQUIVALENT) 250 MG capsule     order for DME     pantoprazole (PROTONIX) 40 MG EC tablet     predniSONE (DELTASONE) 5 MG tablet     rosuvastatin (CRESTOR) 10 MG tablet     sodium hypochlorite (DAKINS) 0.5 % external solution     sulfamethoxazole-trimethoprim (BACTRIM) 400-80 MG tablet     tacrolimus (GENERIC EQUIVALENT) 1 MG capsule     tamsulosin (FLOMAX) 0.4 MG capsule     acetaminophen (TYLENOL) 325 MG tablet     furosemide (LASIX) 20 MG tablet     oxyCODONE (ROXICODONE) 5 MG tablet     polyethylene glycol (MIRALAX) 17 g packet     senna-docusate (SENOKOT-S/PERICOLACE) 8.6-50 MG tablet     No current facility-administered medications for this visit.               Lucian's family history includes Diabetes in his brother, sister, and sister.    ROS:   Patient denies any fevers, chills or sweats as well as any changes in or problems with vision, pain or problems with dentition, new  "or different headaches.  Patient denies symptoms of hypo and hyperglycemia except as above.   Patients denies marked fatigue, cough, shortness of breath, chest pain or pressure.  There has been no pain with or other changes in urination or  itching or pain in genital areas.  Patient denies any noted swelling in feet, ankles or otherwise, loss of sensation or pain  in feet or other areas.    Patient also denies current difficulties with depressed mood, anhedonia or worrying too much.        Exam:    PHONE VISIT    Lucian is alerted and oriented.  With Shivani.  Shivani asking about how to calculate dose when blood sugar is high.  She takes notes; scratches out add 2,4, 6 on sliding scale to make 17, 19 21 etc..   Mood is \"good,\" affect is congruent.  Thoughtful form and content are fluid and coherent.  No signs of distress are appreciated.      Data:      Most recent:  Lab Results   Component Value Date    CR 1.77 08/31/2020     Lab Results   Component Value Date     08/31/2020       GFR Estimate   Date Value Ref Range Status   10/12/2020 36 (L) >60 mL/min/[1.73_m2] Final     Comment:     Non  GFR Calc  Starting 12/18/2018, serum creatinine based estimated GFR (eGFR) will be   calculated using the Chronic Kidney Disease Epidemiology Collaboration   (CKD-EPI) equation.     10/08/2020 42 (L) >60 mL/min/[1.73_m2] Final     Comment:     Non  GFR Calc  Starting 12/18/2018, serum creatinine based estimated GFR (eGFR) will be   calculated using the Chronic Kidney Disease Epidemiology Collaboration   (CKD-EPI) equation.     10/05/2020 43 (L) >60 mL/min/[1.73_m2] Final     Comment:     Non  GFR Calc  Starting 12/18/2018, serum creatinine based estimated GFR (eGFR) will be   calculated using the Chronic Kidney Disease Epidemiology Collaboration   (CKD-EPI) equation.       GFR Estimate If Black   Date Value Ref Range Status   10/12/2020 42 (L) >60 mL/min/[1.73_m2] Final     " Comment:      GFR Calc  Starting 12/18/2018, serum creatinine based estimated GFR (eGFR) will be   calculated using the Chronic Kidney Disease Epidemiology Collaboration   (CKD-EPI) equation.     10/08/2020 49 (L) >60 mL/min/[1.73_m2] Final     Comment:      GFR Calc  Starting 12/18/2018, serum creatinine based estimated GFR (eGFR) will be   calculated using the Chronic Kidney Disease Epidemiology Collaboration   (CKD-EPI) equation.     10/05/2020 50 (L) >60 mL/min/[1.73_m2] Final     Comment:      GFR Calc  Starting 12/18/2018, serum creatinine based estimated GFR (eGFR) will be   calculated using the Chronic Kidney Disease Epidemiology Collaboration   (CKD-EPI) equation.           Lab Results   Component Value Date    A1C 8.2 (H) 07/03/2020    A1C 7.9 (H) 07/08/2019    A1C 8.5 (H) 03/11/2019    A1C 7.6 (H) 10/16/2018    A1C 9.8 (H) 09/06/2017    HEMOGLOBINA1 8.3 (A) 08/06/2018    HEMOGLOBINA1 10.6 (A) 04/30/2018     Lab Results   Component Value Date    MICROL 6 10/03/2018     No results found for: MICROALBUMIN  No results found for: CPEPT, GADAB, ISCAB  Cholesterol   Date Value Ref Range Status   08/17/2020 118 <200 mg/dL Final   07/08/2019 104 <200 mg/dL Final     HDL Cholesterol   Date Value Ref Range Status   08/17/2020 75 >39 mg/dL Final   07/08/2019 27 (L) >39 mg/dL Final     LDL Cholesterol Calculated   Date Value Ref Range Status   08/17/2020 26 <100 mg/dL Final     Comment:     Desirable:       <100 mg/dl   07/08/2019 41 <100 mg/dL Final     Comment:     Desirable:       <100 mg/dl     Triglycerides   Date Value Ref Range Status   08/17/2020 82 <150 mg/dL Final   07/08/2019 181 (H) <150 mg/dL Final     Comment:     Borderline high:  150-199 mg/dl  High:             200-499 mg/dl  Very high:       >499 mg/dl       Cholesterol/HDL Ratio   Date Value Ref Range Status   06/27/2015 2.6 0.0 - 5.0 Final   01/11/2015 6.0 (H) 0.0 - 5.0 Final            Assessment/Plan:    Lucian is a 65 year old male with  has a past medical history of CAD (coronary artery disease), CHF (congestive heart failure) (H), CKD (chronic kidney disease), stage III, Cortical cataract of both eyes, Diabetes (H), Hyperlipidemia, Hypertension, Ischemic cardiomyopathy, Obesity, CHANTEL (obstructive sleep apnea), and Osteoarthritis. He also has no past medical history of Chronic infection, Complication of anesthesia, COPD (chronic obstructive pulmonary disease) (H), Heart murmur, Irregular heart beat, Liver disease, Other chronic pain, or Uncomplicated asthma.    He was recently hospitalized again for hyperglycemia, but BG are now within safe range at home.    Successfully using sliding scale tid before meals.  May need to add qhs >200 if continues to have fasting BG far above goal.  He is reluctant to consider this today.       RTc 2 weeks, urged to contact me sooner of frequent BG >200, any BG<70.      >50% of 30 minute visit spent in counseling, education and coordination of care related to healthy lifestyle, prevention of diabetic complications, options for better glycemic control as well as preventing, detecting, and treating hypoglycemia.      It is my privilege to be involved in the care of the above patient.     Marisabel Prieto PA-C, MPAS  [facility]  Diabetes, Endocrinology, and Metabolism  [phone] Appointments/Nurse  [phone] pager  [phone] nurse line    This note was completed in part using Dragon voice recognition, and may contain word and grammatical errors.

## 2020-10-14 ENCOUNTER — TELEPHONE (OUTPATIENT)
Dept: TRANSPLANT | Facility: CLINIC | Age: 65
End: 2020-10-14

## 2020-10-14 DIAGNOSIS — Z94.1 HEART REPLACED BY TRANSPLANT (H): Primary | ICD-10-CM

## 2020-10-14 LAB — IMMUKNOW IMMUNE CELL FUNCTION: 416 NG/ML

## 2020-10-14 NOTE — TELEPHONE ENCOUNTER
Pt called using .  All labs/testing reviewed from Monday's visit.  Heart biopsy negative.  Pt to stop taking Prednisone and Mycelex troches.  No DSAs.  EBV and CMV negative.  Immuknow 414- mod immune cell response.  Pt to have BMP and Tacro level checked next Tuesday.  Appointment made at [facility] lab for pt at 8am.    Pt's daughter, Elisabeth, will also be updated with above information.  Pt states understanding all instructions.

## 2020-10-15 LAB
ALLOMAP SCORE (EXTERNAL): 37
NEGATIVE PREDICTIVE VALUE PERCENT (EXTERNAL): 98.1 %
POSITIVE PREDICTIVE VALUE PERCENT (EXTERNAL): 9.5 %

## 2020-10-16 ENCOUNTER — OFFICE VISIT (OUTPATIENT)
Dept: CARDIOLOGY | Facility: CLINIC | Age: 65
End: 2020-10-16
Attending: PHYSICIAN ASSISTANT
Payer: COMMERCIAL

## 2020-10-16 VITALS
SYSTOLIC BLOOD PRESSURE: 127 MMHG | HEART RATE: 109 BPM | WEIGHT: 178 LBS | DIASTOLIC BLOOD PRESSURE: 79 MMHG | BODY MASS INDEX: 29.66 KG/M2 | OXYGEN SATURATION: 99 % | HEIGHT: 65 IN

## 2020-10-16 DIAGNOSIS — Z94.1 HEART REPLACED BY TRANSPLANT (H): Primary | ICD-10-CM

## 2020-10-16 DIAGNOSIS — A49.8 PSEUDOMONAS INFECTION: ICD-10-CM

## 2020-10-16 PROCEDURE — G0463 HOSPITAL OUTPT CLINIC VISIT: HCPCS

## 2020-10-16 PROCEDURE — 99024 POSTOP FOLLOW-UP VISIT: CPT | Performed by: NURSE PRACTITIONER

## 2020-10-16 ASSESSMENT — MIFFLIN-ST. JEOR: SCORE: 1509.28

## 2020-10-16 ASSESSMENT — PAIN SCALES - GENERAL: PAINLEVEL: NO PAIN (0)

## 2020-10-16 NOTE — NURSING NOTE
Chief Complaint   Patient presents with     Follow Up     Small wound vac dressing change.     Vitals were taken and medications were reconciled.    Francisco Javier Puentes CMA    2:09 PM

## 2020-10-16 NOTE — PROGRESS NOTES
CARDIOTHORACIC SURGERY FOLLOW-UP VISIT  10/16/2020     Lucian Henderson Sr.   1955   [id number]      Reason for visit: Post-Op incision and debridement of mid infraclavicular wound and midline chest tube wound with Dr. Ran Huertas on 9/23/2020 and 9/28/2020.     HPI: Lucian Henderson Sr. is a 65 year old year old male seen in clinic for a routine follow-up appointment after surgery.     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  He is immunosuppressed with medications for his heart transplant. He has had steroid induced hyperglycemia with Type II diabetes as well. He is on corrective sliding scale. He has a history of DVT's and is on fondaparinux.  He was admitted 9/22/2020 with drainage from her previous surgical sites and underwent the above procedures.     His abdominal wound 9/23/20 culture was positive for Pseudomonas aeruginosa and Streptococcus mitis group. His left subclavian incision culture 9/23/20 positive for Pseudomonas aeruginosa.  He was seen by ID and was started on Ciprofloxacin. He was neutropenic, off Valcyte and restarting low dose Cellcept at discharge, needs weekly CMV levels. Got one time dose of Neupogen, half dose 10/3. Patient at increased risk of rejection with Neupogen. He is following up in the transplant clinic. After a prolonged hospitalization with wound care, getting control of his diabetes and management of his transplant medications he was discharged home October 5, 2020 with wound care per home health nurse.     Discharged with Left subclavian Wound vac in place. Wound dimensions 10/5: 4.5 cm long, 2.5 cm wide, 1.5 deep. No tunneling or undermining, minimal slough. Dakins solution TID on abdominal/periumbilical wound.      Patient has been doing well since discharge. Pain is well controlled on current medications. Patient otherwise denies any fever, chills,  palpitations, SOB, lightheadedness and nausea.  he is having Normal bowel movements and voiding without problems.      PAST MEDICAL HISTORY:  Past Medical History:   Diagnosis Date     CAD (coronary artery disease)      CHF (congestive heart failure) (H)      CKD (chronic kidney disease), stage III      Cortical cataract of both eyes      Diabetes (H)      Hyperlipidemia      Hypertension      Ischemic cardiomyopathy      Obesity      CHANTEL (obstructive sleep apnea)     occas cpap     Osteoarthritis        PAST SURGICAL HISTORY:  Past Surgical History:   Procedure Laterality Date     C CABG, ARTERY-VEIN, THREE  02/2008     CATARACT IOL, RT/LT       COLONOSCOPY N/A 8/7/2019    Procedure: COLONOSCOPY, WITH POLYPECTOMY AND BIOPSY;  Surgeon: Chauncey Morataya MD;  Location:  GI     CV ANGIOGRAM CORONARY GRAFT N/A 7/2/2020    Procedure: Angiogram Coronary Graft;  Surgeon: Alex Lantigua MD;  Location:  HEART CARDIAC CATH LAB     CV CORONARY ANGIOGRAM N/A 7/2/2020    Procedure: CV CORONARY ANGIOGRAM;  Surgeon: Alex Lantigua MD;  Location:  HEART CARDIAC CATH LAB     CV HEART BIOPSY N/A 7/27/2020    Procedure: Heart Biopsy;  Surgeon: Mario Burr MD;  Location:  HEART CARDIAC CATH LAB     CV HEART BIOPSY N/A 8/3/2020    Procedure: CV HEART BIOPSY;  Surgeon: Alex Lantigua MD;  Location:  HEART CARDIAC CATH LAB     CV HEART BIOPSY N/A 8/10/2020    Procedure: CV HEART BIOPSY;  Surgeon: Mario Burr MD;  [facility]     CV HEART BIOPSY N/A 8/17/2020    Procedure: CV HEART BIOPSY;  Surgeon: Raimundo Hudson MD;  Location:  HEART CARDIAC CATH LAB     CV HEART BIOPSY N/A 8/31/2020    Procedure: CV HEART BIOPSY;  Surgeon: Moises Santos MD;  Location:  HEART CARDIAC CATH LAB     CV HEART BIOPSY N/A 9/14/2020    Procedure: CV HEART BIOPSY;  Surgeon: Raimundo Hudson MD;  Location:  HEART CARDIAC CATH LAB     CV HEART BIOPSY  N/A 9/28/2020    Procedure: CV HEART BIOPSY;  Surgeon: Raimundo Hudson MD;  [facility]     CV HEART BIOPSY N/A 10/12/2020    Procedure: CV HEART BIOPSY;  Surgeon: John Stuart MD;  [facility]     CV INTRA-AORTIC BALLOON PUMP INSERTION N/A 7/14/2020    Procedure: RHC WITH LEAVE IN SWAN/IABP;  Surgeon: Sonny Saleh MD;  [facility]     CV RIGHT HEART CATH N/A 3/25/2019    Procedure: CV RIGHT HEART CATH;  Surgeon: Moises Santos MD;  Location:  HE[facility] CARDIAC CATH LAB     CV RIGHT HEART CATH N/A 7/10/2019    Procedure: CV RIGHT HEART CATH;  Surgeon: Jak Mccabe MD;  [facility]     CV RIGHT HEART CATH N/A 7/8/2020    Procedure: Right Heart Cath with Leave In swan already has ICU load;  Surgeon: Jak Mccabe MD;  [facility]     CV RIGHT HEART CATH N/A 7/14/2020    Procedure: CV RIGHT HEART CATH;  Surgeon: Sonny Saleh MD;  [facility]     CV RIGHT HEART CATH N/A 7/2/2020    Procedure: CV RIGHT HEART CATH;  Surgeon: Alex Lantigua MD;  [facility]     CV RIGHT HEART CATH N/A 7/27/2020    Procedure: Right Heart Cath;  Surgeon: Mario Burr MD;  [facility]     CV RIGHT HEART CATH N/A 8/3/2020    Procedure: Right Heart Cath;  Surgeon: Alex Lantigua MD;  [facility]     CV RIGHT HEART CATH N/A 8/10/2020    Procedure: CV RIGHT HEART CATH;  Surgeon: Mario Burr MD;  [facility]     CV RIGHT HEART CATH N/A 8/17/2020    Procedure: CV RIGHT HEART CATH;  Surgeon: Raimundo Hudson MD;  [facility]     CV RIGHT HEART CATH N/A 8/31/2020    Procedure: CV RIGHT HEART CATH;  Surgeon: Moises Santos MD;  Location:  HEART CARDIAC CATH LAB     CV RIGHT HEART CATH N/A 9/14/2020     Procedure: CV RIGHT HEART CATH;  Surgeon: Raimundo Hudson MD;  Location:  HEART CARDIAC CATH LAB     CV RIGHT HEART CATH N/A 9/28/2020    Procedure: CV RIGHT HEART CATH;  Surgeon: Raimundo Hudson MD;  Location:  HEART CARDIAC CATH LAB     CV RIGHT HEART CATH N/A 10/12/2020    Procedure: CV RIGHT HEART CATH;  Surgeon: John Stuart MD;  Location:  HEART CARDIAC CATH LAB     EXTRACTION(S) DENTAL Left 7/13/2020    Procedure: EXTRACTION, TOOTH #11, 12, 13, 15, and 29;  Surgeon: Monica Chao DDS;  [facility]     IMPLANT AUTOMATIC IMPLANTABLE CARDIOVERTER DEFIBRILLATOR       INCISION AND DRAINAGE STERNUM W/ IRRIGATION SYSTEM, COMBINED N/A 9/23/2020    Procedure: INCISION AND DRAINAGE, LEFT SUPRACLAVICULAR WOUND INFECTION AND ABDOMENN WOUND.;  Surgeon: Ran Huertas MD;  [facility]     INSERT INTRAAORTIC BALLOON PUMP N/A 7/16/2020    Procedure: Subclavian Intra-Aortic Balloon Pump Placement;  Surgeon: Allan Sparrow MD;  [facility]     IRRIGATION AND DEBRIDEMENT CHEST WASHOUT, COMBINED N/A 9/28/2020    Procedure: IRRIGATION AND DEBRIDEMENT OF LEFT UPPER CHEST WOUND.;  Surgeon: Ran Huertas MD;  [facility]     PHACOEMULSIFICATION CLEAR CORNEA WITH STANDARD IOL, VITRECTOMY PARSPLANA 25 GAGUE, COMBINED Left 12/10/2019    Procedure: PHACOEMULSIFICATION, CATARACT, CLEAR CORNEAL INCISION APPROACH, W STD INTRAOCULAR LENS IMPLANT INSERT + VITRECTOMY BY PARS PLANA  USING 25-GAUGE INSTRUMENTS. ENDOLASER, INFUSION OF 20% SF6 GAS;  Surgeon: Triny Bunch MD;  [facility]     PICC DOUBLE LUMEN PLACEMENT Left 07/28/2020    5Fr - 42cm, Basilic vein, mid SVC     PICC INSERTION Right 07/11/2020    basilic 44 cm total      TRANSPLANT HEART RECIPIENT N/A 7/19/2020    Procedure: REDO MEDIAN STERNOTOMY, TRANSPLANT, ORTHOTOPIC HEART, RECIPIENT, ON PUMP OXYGENATOR, REMOVAL OF CARDIAC DEFIBRILLATOR AND LEAD;  Surgeon: Griselli, Massimo, MD;  Location[facility]        CURRENT MEDICATIONS:   Current Outpatient Medications   Medication     acetaminophen (TYLENOL) 325 MG tablet     Alcohol Swabs PADS     aspirin (ASA) 81 MG chewable tablet     blood glucose (NO BRAND SPECIFIED) test strip     blood glucose monitoring (ACCU-CHEK FELIPE SMARTVIEW) meter device kit     blood glucose monitoring (ONE TOUCH DELICA) lancets     calcium carbonate 600 mg-vitamin D 400 units (CALTRATE) 600-400 MG-UNIT per tablet     ciprofloxacin (CIPRO) 500 MG tablet     Continuous Blood Gluc Sensor (FREESTYLE JEREMY 14 DAY SENSOR) MISC     Continuous Blood Gluc Sensor (FREESTYLE JEREMY 14 DAY SENSOR) MISC     fondaparinux ANTICOAGULANT (ARIXTRA) 7.5MG/0.6ML injection     furosemide (LASIX) 20 MG tablet     hydrALAZINE (APRESOLINE) 100 MG tablet     insulin aspart (NOVOLOG PEN) 100 UNIT/ML pen     insulin isophane human (HUMULIN N PEN) 100 UNIT/ML injection     insulin isophane human (HUMULIN N PEN) 100 UNIT/ML injection     insulin pen needle (32G X 4 MM) 32G X 4 MM miscellaneous     insulin pen needle (B-D U/F) 31G X 5 MM miscellaneous     insulin pen needle (NOVOFINE) 30G X 8 MM miscellaneous     magnesium oxide 400 MG CAPS     mycophenolate (GENERIC EQUIVALENT) 250 MG capsule     order for DME     oxyCODONE (ROXICODONE) 5 MG tablet     pantoprazole (PROTONIX) 40 MG EC tablet     polyethylene glycol (MIRALAX) 17 g packet     rosuvastatin (CRESTOR) 10 MG tablet     senna-docusate (SENOKOT-S/PERICOLACE) 8.6-50 MG tablet     sodium hypochlorite (DAKINS) 0.5 % external solution     sulfamethoxazole-trimethoprim (BACTRIM) 400-80 MG tablet     tacrolimus (GENERIC EQUIVALENT) 1 MG capsule     tamsulosin (FLOMAX) 0.4 MG capsule     No current facility-administered medications for this visit.        ALLERGIES:      Allergies   Allergen Reactions     Heparin Heparin Induced Thrombocytopenia     HIT screen sent 7/18/2020. CORRINE confirmed positive       ROS:  10 point ROS neg other than the symptoms noted above in  "the HPI.    PHYSICAL EXAM:   /79 (BP Location: Right arm, Patient Position: Chair, Cuff Size: Adult Regular)   Pulse 109   Ht 1.651 m (5' 5\")   Wt 80.7 kg (178 lb)   SpO2 99%   BMI 29.62 kg/m    General: alert and oriented x 3, pleasant, no acute distress, normal mood and affect. Wife is present during the visit.   CV: S1 S2, no murmurs, rubs or gallops, regular rate and rhythm, Has franklyn ACE wraps on extremities.   Pulm: bilateral breath sounds, clear to auscultation, easy work of breathing  GI: (+) bowel sounds, soft non-tender and non-distended  Incision: Left subclavian wound vac removed with wound bed pink with small necrotic area that was debrided with pink tissue underneath. Wound is approximately 3 cm long, 2 cm wide and 0.5-1 cm deep. The Abdominal wound was had granulated tissue around the edges, firm, wound bed pink without drainage.   Neuro: nonfocal    LABS: None     IMAGING: None    PROCEDURES:   Left subclavian wound vac removed. Wound cleansed with MicroKlenz and small area debrided with pink tissue underneath. Was packed with nuguaze and covered with gauze dressing   Abd wound cleansed with MicroKlenz and gauze applied.       ASSESSMENT/PLAN:  Lucian Henderson Sr. 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 Huertas who returns to clinic for postop visit.    1. Previous ICD site and Chest tube site infection with  pseudomonas aeruginosa and Streptococcus mitis group- s/p I & D of wounds 9/23 & 9/28/2020. He was discharged home on Ciproflaxin for 26 days and wound vac to the left subclavian area and dressing changes to the abdominal chest tube site. He has " been receiving Home Health care and reports that he is doing well. No drainage from the wound vac since discharge. No fevers or chills.    Wound vac was removed with evidence of the subclavian wound healing nicely, there was a small necrotic area removed with pink tissue underneath. The wound bed was pink and healthy looking. The abdominal wound had some hardened, healing skin around the edges, otherwise appeared to be healing nicely as well with no drainage.     Plan:   1. Wound care:   Left upper chest wound:    Discontinue wound vac   Change dressing and packing twice daily - Clean wound with MicroKlenz spray, pack with 2-3 inches of nugauze, then cover with gauze and paper tape.   Abd wound:    Change dressing twice daily - clean with MicroKlenz spray and replace gauze dressing.   2. Continue current medications  3. Continue to follow up with cardiology transplant team.   4. Return to clinic November 27, 2020 at 1:45 pm for a 2:00 pm appointment.       The total time spent with the patient was 40 minutes, > 50% of which was spent in counseling.    CC  Suyapa Hatfield

## 2020-10-16 NOTE — PATIENT INSTRUCTIONS
1. Wound care:   Left upper chest wound:   Discontinue wound vac  Change dressing and packing twice daily - Clean wound with MicroKlenz spray, pack with 2-3 inches of nugauze, then cover with gauze and paper tape.   Abd wound:   Change dressing twice daily - clean with MicroKlenz spray and replace gauze dressing.   2. Continue current medications  3. Return to clinic November 27, 2020 at 1:45 pm for a 2:00 pm appointment.

## 2020-10-16 NOTE — LETTER
10/16/2020      RE: Lucian Henderson Sr.  [address] [facility] [address]       Dear Colleague,    Thank you for the opportunity to participate in the care of your patient, Lucian Henderson Sr., at the [facility] HEART [facility] at [facility]. Please see a copy of my visit note below.        CARDIOTHORACIC SURGERY FOLLOW-UP VISIT  10/16/2020     Lucian Henderson Sr.   1955   [id number]      Reason for visit: Post-Op incision and debridement of mid infraclavicular wound and midline chest tube wound with Dr. Ran Huertas on 9/23/2020 and 9/28/2020.     HPI: Lucian Henderson Sr. is a 65 year old year old male seen in clinic for a routine follow-up appointment after surgery.     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  He is immunosuppressed with medications for his heart transplant. He has had steroid induced hyperglycemia with Type II diabetes as well. He is on corrective sliding scale. He has a history of DVT's and is on fondaparinux.  He was admitted 9/22/2020 with drainage from her previous surgical sites and underwent the above procedures.     His abdominal wound 9/23/20 culture was positive for Pseudomonas aeruginosa and Streptococcus mitis group. His left subclavian incision culture 9/23/20 positive for Pseudomonas aeruginosa.  He was seen by ID and was started on Ciprofloxacin. He was neutropenic, off Valcyte and restarting low dose Cellcept at discharge, needs weekly CMV levels. Got one time dose of Neupogen, half dose 10/3. Patient at increased risk of rejection with Neupogen. He is following up in the transplant clinic. After a prolonged hospitalization with wound care, getting control of his diabetes and management of his transplant medications he was discharged home October 5, 2020 with wound care per home  health nurse.     Discharged with Left subclavian Wound vac in place. Wound dimensions 10/5: 4.5 cm long, 2.5 cm wide, 1.5 deep. No tunneling or undermining, minimal slough. Dakins solution TID on abdominal/periumbilical wound.      Patient has been doing well since discharge. Pain is well controlled on current medications. Patient otherwise denies any fever, chills, palpitations, SOB, lightheadedness and nausea.  he is having Normal bowel movements and voiding without problems.      PAST MEDICAL HISTORY:  Past Medical History:   Diagnosis Date     CAD (coronary artery disease)      CHF (congestive heart failure) (H)      CKD (chronic kidney disease), stage III      Cortical cataract of both eyes      Diabetes (H)      Hyperlipidemia      Hypertension      Ischemic cardiomyopathy      Obesity      CHANTEL (obstructive sleep apnea)     occas cpap     Osteoarthritis        PAST SURGICAL HISTORY:  Past Surgical History:   Procedure Laterality Date     C CABG, ARTERY-VEIN, THREE  02/2008     CATARACT IOL, RT/LT       COLONOSCOPY N/A 8/7/2019    Procedure: COLONOSCOPY, WITH POLYPECTOMY AND BIOPSY;  Surgeon: Chauncey Morataya MD;  Location:  GI     CV ANGIOGRAM CORONARY GRAFT N/A 7/2/2020    Procedure: Angiogram Coronary Graft;  Surgeon: Alex Lantigua MD;  Location:  HEART CARDIAC CATH LAB     CV CORONARY ANGIOGRAM N/A 7/2/2020    Procedure: CV CORONARY ANGIOGRAM;  Surgeon: Alex Lantigua MD;  Location: U HEART CARDIAC CATH LAB     CV HEART BIOPSY N/A 7/27/2020    Procedure: Heart Biopsy;  Surgeon: Mario Burr MD;  Location: U HEART CARDIAC CATH LAB     CV HEART BIOPSY N/A 8/3/2020    Procedure: CV HEART BIOPSY;  Surgeon: Alex Lantigua MD;  Location: U HEART CARDIAC CATH LAB     CV HEART BIOPSY N/A 8/10/2020    Procedure: CV HEART BIOPSY;  Surgeon: Mario Burr MD;  Location: [facility] HEART CARDIAC CATH LAB     CV HEART BIOPSY N/A 8/17/2020    Procedure: CV HEART BIOPSY;  Surgeon:  Raimundo Hudson MD;  Location: U HEART CARDIAC CATH LAB     CV HEART BIOPSY N/A 8/31/2020    Procedure: CV HEART BIOPSY;  Surgeon: Moises Santos MD;  Location: U HEART CARDIAC CATH LAB     CV HEART BIOPSY N/A 9/14/2020    Procedure: CV HEART BIOPSY;  Surgeon: Raimundo Hudson MD;  Location:  HEART CARDIAC CATH LAB     CV HEART BIOPSY N/A 9/28/2020    Procedure: CV HEART BIOPSY;  Surgeon: Raimundo Hudson MD;  Location: U HEART CARDIAC CATH LAB     CV HEART BIOPSY N/A 10/12/2020    Procedure: CV HEART BIOPSY;  Surgeon: John Stuart MD;  Location:  HEART CARDIAC CATH LAB     CV INTRA-AORTIC BALLOON PUMP INSERTION N/A 7/14/2020    Procedure: RHC WITH LEAVE IN SWAN/IABP;  Surgeon: Sonny Saleh MD;  Location:  HEART CARDIAC CATH LAB     CV RIGHT HEART CATH N/A 3/25/2019    Procedure: CV RIGHT HEART CATH;  Surgeon: Moises Santos MD;  Location:  HEART CARDIAC CATH LAB     CV RIGHT HEART CATH N/A 7/10/2019    Procedure: CV RIGHT HEART CATH;  Surgeon: Jak Mccabe MD;  Location:  HEART CARDIAC CATH LAB     CV RIGHT HEART CATH N/A 7/8/2020    Procedure: Right Heart Cath with Leave In swan already has ICU load;  Surgeon: Jak Mccabe MD;  Location:  HEART CARDIAC CATH LAB     CV RIGHT HEART CATH N/A 7/14/2020    Procedure: CV RIGHT HEART CATH;  Surgeon: Sonny Saleh MD;  Location:  HEART CARDIAC CATH LAB     CV RIGHT HEART CATH N/A 7/2/2020    Procedure: CV RIGHT HEART CATH;  Surgeon: Alex Lantigua MD;  Location: [facility] HEART CARDIAC CATH LAB     CV RIGHT HEART CATH N/A 7/27/2020    Procedure: Right Heart Cath;  Surgeon: Mario Burr MD;  Location:  HEART CARDIAC CATH LAB     CV RIGHT HEART CATH N/A 8/3/2020    Procedure: Right Heart Cath;  Surgeon: Alex Lantigua MD;  Location:  HEART CARDIAC CATH LAB     CV RIGHT HEART CATH N/A 8/10/2020    Procedure: CV RIGHT HEART CATH;  Surgeon: Mario Burr MD;   Location:  HEART CARDIAC CATH LAB     CV RIGHT HEART CATH N/A 8/17/2020    Procedure: CV RIGHT HEART CATH;  Surgeon: Raimundo Hudson MD;  Location:  HEART CARDIAC CATH LAB     CV RIGHT HEART CATH N/A 8/31/2020    Procedure: CV RIGHT HEART CATH;  Surgeon: Moises Santos MD;  Location:  HEART CARDIAC CATH LAB     CV RIGHT HEART CATH N/A 9/14/2020    Procedure: CV RIGHT HEART CATH;  Surgeon: Raimundo Hudson MD;  Location:  HEART CARDIAC CATH LAB     CV RIGHT HEART CATH N/A 9/28/2020    Procedure: CV RIGHT HEART CATH;  Surgeon: Raimundo Hudson MD;  Location:  HEART CARDIAC CATH LAB     CV RIGHT HEART CATH N/A 10/12/2020    Procedure: CV RIGHT HEART CATH;  Surgeon: John Stuart MD;  Location:  HEART CARDIAC CATH LAB     EXTRACTION(S) DENTAL Left 7/13/2020    Procedure: EXTRACTION, TOOTH #11, 12, 13, 15, and 29;  Surgeon: Monica Chao DDS;  Location: [facility] OR     IMPLANT AUTOMATIC IMPLANTABLE CARDIOVERTER DEFIBRILLATOR       INCISION AND DRAINAGE STERNUM W/ IRRIGATION SYSTEM, COMBINED N/A 9/23/2020    Procedure: INCISION AND DRAINAGE, LEFT SUPRACLAVICULAR WOUND INFECTION AND ABDOMENN WOUND.;  Surgeon: Ran Huertas MD;  Location: [facility] OR     INSERT INTRAAORTIC BALLOON PUMP N/A 7/16/2020    Procedure: Subclavian Intra-Aortic Balloon Pump Placement;  Surgeon: Allan Sparrow MD;  Location: [facility] OR     IRRIGATION AND DEBRIDEMENT CHEST WASHOUT, COMBINED N/A 9/28/2020    Procedure: IRRIGATION AND DEBRIDEMENT OF LEFT UPPER CHEST WOUND.;  Surgeon: Ran Huertas MD;  Location: [facility] OR     PHACOEMULSIFICATION CLEAR CORNEA WITH STANDARD IOL, VITRECTOMY PARSPLANA 25 GAGUE, COMBINED Left 12/10/2019    Procedure: PHACOEMULSIFICATION, CATARACT, CLEAR CORNEAL INCISION APPROACH, W STD INTRAOCULAR LENS IMPLANT INSERT + VITRECTOMY BY PARS PLANA  USING 25-GAUGE INSTRUMENTS. ENDOLASER, INFUSION OF 20% SF6 GAS;  Surgeon: Triny Bunch MD;  Location: [facility] OR     PICC DOUBLE LUMEN  PLACEMENT Left 07/28/2020    5Fr - 42cm, Basilic vein, mid SVC     PICC INSERTION Right 07/11/2020    basilic 44 cm total      TRANSPLANT HEART RECIPIENT N/A 7/19/2020    Procedure: REDO MEDIAN STERNOTOMY, TRANSPLANT, ORTHOTOPIC HEART, RECIPIENT, ON PUMP OXYGENATOR, REMOVAL OF CARDIAC DEFIBRILLATOR AND LEAD;  Surgeon: Griselli, Massimo, MD;  Location: [facility] OR       CURRENT MEDICATIONS:   Current Outpatient Medications   Medication     acetaminophen (TYLENOL) 325 MG tablet     Alcohol Swabs PADS     aspirin (ASA) 81 MG chewable tablet     blood glucose (NO BRAND SPECIFIED) test strip     blood glucose monitoring (ACCU-CHEK FELIPE SMARTVIEW) meter device kit     blood glucose monitoring (ONE TOUCH DELICA) lancets     calcium carbonate 600 mg-vitamin D 400 units (CALTRATE) 600-400 MG-UNIT per tablet     ciprofloxacin (CIPRO) 500 MG tablet     Continuous Blood Gluc Sensor (FREESTYLE JEREMY 14 DAY SENSOR) MISC     Continuous Blood Gluc Sensor (FREESTYLE JEREMY 14 DAY SENSOR) MISC     fondaparinux ANTICOAGULANT (ARIXTRA) 7.5MG/0.6ML injection     furosemide (LASIX) 20 MG tablet     hydrALAZINE (APRESOLINE) 100 MG tablet     insulin aspart (NOVOLOG PEN) 100 UNIT/ML pen     insulin isophane human (HUMULIN N PEN) 100 UNIT/ML injection     insulin isophane human (HUMULIN N PEN) 100 UNIT/ML injection     insulin pen needle (32G X 4 MM) 32G X 4 MM miscellaneous     insulin pen needle (B-D U/F) 31G X 5 MM miscellaneous     insulin pen needle (NOVOFINE) 30G X 8 MM miscellaneous     magnesium oxide 400 MG CAPS     mycophenolate (GENERIC EQUIVALENT) 250 MG capsule     order for DME     oxyCODONE (ROXICODONE) 5 MG tablet     pantoprazole (PROTONIX) 40 MG EC tablet     polyethylene glycol (MIRALAX) 17 g packet     rosuvastatin (CRESTOR) 10 MG tablet     senna-docusate (SENOKOT-S/PERICOLACE) 8.6-50 MG tablet     sodium hypochlorite (DAKINS) 0.5 % external solution     sulfamethoxazole-trimethoprim (BACTRIM) 400-80 MG tablet     tacrolimus  "(GENERIC EQUIVALENT) 1 MG capsule     tamsulosin (FLOMAX) 0.4 MG capsule     No current facility-administered medications for this visit.        ALLERGIES:      Allergies   Allergen Reactions     Heparin Heparin Induced Thrombocytopenia     HIT screen sent 7/18/2020. CORRINE confirmed positive       ROS:  10 point ROS neg other than the symptoms noted above in the HPI.    PHYSICAL EXAM:   /79 (BP Location: Right arm, Patient Position: Chair, Cuff Size: Adult Regular)   Pulse 109   Ht 1.651 m (5' 5\")   Wt 80.7 kg (178 lb)   SpO2 99%   BMI 29.62 kg/m    General: alert and oriented x 3, pleasant, no acute distress, normal mood and affect. Wife is present during the visit.   CV: S1 S2, no murmurs, rubs or gallops, regular rate and rhythm, Has franklyn ACE wraps on extremities.   Pulm: bilateral breath sounds, clear to auscultation, easy work of breathing  GI: (+) bowel sounds, soft non-tender and non-distended  Incision: Left subclavian wound vac removed with wound bed pink with small necrotic area that was debrided with pink tissue underneath. Wound is approximately 3 cm long, 2 cm wide and 0.5-1 cm deep. The Abdominal wound was had granulated tissue around the edges, firm, wound bed pink without drainage.   Neuro: nonfocal    LABS: None     IMAGING: None    PROCEDURES:   Left subclavian wound vac removed. Wound cleansed with MicroKlenz and small area debrided with pink tissue underneath. Was packed with nuguaze and covered with gauze dressing   Abd wound cleansed with MicroKlenz and gauze applied.       ASSESSMENT/PLAN:  Lucian Oliveira Santiago Sr. 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 Huertas who returns to clinic for postop visit.    1. Previous ICD site and Chest tube site infection with  pseudomonas aeruginosa and Streptococcus mitis group- s/p I & D of wounds 9/23 & 9/28/2020. He was discharged home on Ciproflaxin for 26 days and wound vac to the left subclavian area and dressing changes to the abdominal chest tube site. He has been receiving Home Health care and reports that he is doing well. No drainage from the wound vac since discharge. No fevers or chills.    Wound vac was removed with evidence of the subclavian wound healing nicely, there was a small necrotic area removed with pink tissue underneath. The wound bed was pink and healthy looking. The abdominal wound had some hardened, healing skin around the edges, otherwise appeared to be healing nicely as well with no drainage.     Plan:   1. Wound care:   Left upper chest wound:    Discontinue wound vac   Change dressing and packing twice daily - Clean wound with MicroKlenz spray, pack with 2-3 inches of nugauze, then cover with gauze and paper tape.   Abd wound:    Change dressing twice daily - clean with MicroKlenz spray and replace gauze dressing.   2. Continue current medications  3. Continue to follow up with cardiology transplant team.   4. Return to clinic November 27, 2020 at 1:45 pm for a 2:00 pm appointment.       The total time spent with the patient was 40 minutes, > 50% of which was spent in counseling.    CC  Suyapa Hatfield       Please do not hesitate to contact me if you have any questions/concerns.     Sincerely,     VIDEO,

## 2020-10-20 DIAGNOSIS — Z94.1 HEART REPLACED BY TRANSPLANT (H): ICD-10-CM

## 2020-10-20 DIAGNOSIS — I50.22 CHRONIC SYSTOLIC HEART FAILURE (H): Chronic | ICD-10-CM

## 2020-10-20 LAB
ALBUMIN SERPL-MCNC: 2.6 G/DL (ref 3.4–5)
ALP SERPL-CCNC: 131 U/L (ref 40–150)
ALT SERPL W P-5'-P-CCNC: 18 U/L (ref 0–70)
ANION GAP SERPL CALCULATED.3IONS-SCNC: 5 MMOL/L (ref 3–14)
AST SERPL W P-5'-P-CCNC: 18 U/L (ref 0–45)
BILIRUB SERPL-MCNC: 0.3 MG/DL (ref 0.2–1.3)
BUN SERPL-MCNC: 23 MG/DL (ref 7–30)
CALCIUM SERPL-MCNC: 8.3 MG/DL (ref 8.5–10.1)
CHLORIDE SERPL-SCNC: 106 MMOL/L (ref 94–109)
CO2 SERPL-SCNC: 26 MMOL/L (ref 20–32)
CREAT SERPL-MCNC: 1.42 MG/DL (ref 0.66–1.25)
ERYTHROCYTE [DISTWIDTH] IN BLOOD BY AUTOMATED COUNT: 17.8 % (ref 10–15)
GFR SERPL CREATININE-BSD FRML MDRD: 51 ML/MIN/{1.73_M2}
GLUCOSE SERPL-MCNC: 107 MG/DL (ref 70–99)
HCT VFR BLD AUTO: 30.5 % (ref 40–53)
HGB BLD-MCNC: 9.2 G/DL (ref 13.3–17.7)
MCH RBC QN AUTO: 29.9 PG (ref 26.5–33)
MCHC RBC AUTO-ENTMCNC: 30.2 G/DL (ref 31.5–36.5)
MCV RBC AUTO: 99 FL (ref 78–100)
PLATELET # BLD AUTO: 404 10E9/L (ref 150–450)
POTASSIUM SERPL-SCNC: 4.4 MMOL/L (ref 3.4–5.3)
PROT SERPL-MCNC: 6.3 G/DL (ref 6.8–8.8)
RBC # BLD AUTO: 3.08 10E12/L (ref 4.4–5.9)
SODIUM SERPL-SCNC: 137 MMOL/L (ref 133–144)
TACROLIMUS BLD-MCNC: 4 UG/L (ref 5–15)
TME LAST DOSE: 2000 H
WBC # BLD AUTO: 6.1 10E9/L (ref 4–11)

## 2020-10-20 PROCEDURE — 80197 ASSAY OF TACROLIMUS: CPT | Performed by: INTERNAL MEDICINE

## 2020-10-20 PROCEDURE — 80053 COMPREHEN METABOLIC PANEL: CPT | Performed by: INTERNAL MEDICINE

## 2020-10-20 PROCEDURE — 36415 COLL VENOUS BLD VENIPUNCTURE: CPT | Performed by: INTERNAL MEDICINE

## 2020-10-20 PROCEDURE — 85027 COMPLETE CBC AUTOMATED: CPT | Performed by: INTERNAL MEDICINE

## 2020-10-21 ENCOUNTER — VIRTUAL VISIT (OUTPATIENT)
Dept: HEMATOLOGY | Facility: CLINIC | Age: 65
End: 2020-10-21
Attending: INTERNAL MEDICINE
Payer: COMMERCIAL

## 2020-10-21 ENCOUNTER — TELEPHONE (OUTPATIENT)
Dept: TRANSPLANT | Facility: CLINIC | Age: 65
End: 2020-10-21

## 2020-10-21 ENCOUNTER — APPOINTMENT (OUTPATIENT)
Dept: INTERPRETER SERVICES | Facility: CLINIC | Age: 65
End: 2020-10-21
Payer: COMMERCIAL

## 2020-10-21 VITALS — BODY MASS INDEX: 29.62 KG/M2 | WEIGHT: 178 LBS

## 2020-10-21 DIAGNOSIS — Z94.1 HEART TRANSPLANT, ORTHOTOPIC, STATUS (H): ICD-10-CM

## 2020-10-21 DIAGNOSIS — Z86.2 HISTORY OF HEPARIN-INDUCED THROMBOCYTOPENIA: Primary | ICD-10-CM

## 2020-10-21 PROCEDURE — 999N001193 HC VIDEO/TELEPHONE VISIT; NO CHARGE

## 2020-10-21 PROCEDURE — T1013 SIGN LANG/ORAL INTERPRETER: HCPCS | Mod: U3,TEL

## 2020-10-21 PROCEDURE — 99443 PR PHYSICIAN TELEPHONE EVALUATION 21-30 MIN: CPT | Mod: 95 | Performed by: INTERNAL MEDICINE

## 2020-10-21 RX ORDER — TACROLIMUS 1 MG/1
CAPSULE ORAL
Qty: 210 CAPSULE | Refills: 11 | Status: SHIPPED | OUTPATIENT
Start: 2020-10-21 | End: 2020-10-27

## 2020-10-21 NOTE — TELEPHONE ENCOUNTER
Pt called with Tuesday's lab results.  Tacrolimus level 4.  Goal 8-10. Pt increase Tacrolimus dose to 4mg in the AM and 3mg in the PM.  Pt recheck level on Monday.  Pt was called with above information using .  Pt's daughter, Elisabeth, also updated.  Pt state understanding instructions and plan of care.

## 2020-10-21 NOTE — PROGRESS NOTES
[facility]  [address] [place] [id number]  Main: [phone], Fax: [phone]    Patient seen at: [facility] at [address] [place] [address]    Video Virtual Visit Note:    Patient: Lucian Henderson Sr.  MRN: [id number]  : 1955  ROSY: 2020    Due to the ongoing COVID-19 outbreak, this visit was conducted by telephone, with the patient's approval.    Total telephone time: 21 minutes (with )    Reason for visit: Folllow up for Heparin induced thrombocytopenia.    History of Present Illness:  Lucian Henderson Sr. is a 65 year old man with a history of coronary artery disease who received a heart transplant here at [facility] on 2020.  Prior to transplant, unfortunately, he developed heparin-induced thrombocytopenia, confirmed with CORRINE test.  He received plasma exchange in preparation for cardiopulmonary bypass.  Following transplantation, he was placed on fondaparinux for treatment of HIT with thrombosis of his RIJ line-associated VTE and R PICC line-associated VTE.    Since transplant, he reports he is doing well on fondaparinux but notes the injections are painful and he is hoping to stop treatment soon.  He has no new symptoms of VTE such as limb swelling or pain reported today. He has been holding the injection prior to surveillance heart biopsies    Past Medical History:  Past Medical History:   Diagnosis Date     CAD (coronary artery disease)      CHF (congestive heart failure) (H)      CKD (chronic kidney disease), stage III      Cortical cataract of both eyes      Diabetes (H)      Hyperlipidemia      Hypertension      Ischemic cardiomyopathy      Obesity      CHANTEL (obstructive sleep apnea)     occas cpap     Osteoarthritis        Past Surgical History:  CABG many years ago  Heart transplant as above in 2020    Medications:  Current Outpatient Medications   Medication Sig      acetaminophen (TYLENOL) 325 MG tablet Take 3 tablets (975 mg) by mouth every 8 hours as needed for mild pain     Alcohol Swabs PADS Use to swab the area of the injection or sergio as directed   Per insurance coverage     aspirin (ASA) 81 MG chewable tablet Take 1 tablet (81 mg) by mouth daily     blood glucose (NO BRAND SPECIFIED) test strip Use to test blood sugar 2 times daily or as directed.     blood glucose monitoring (ACCU-CHEK FELIPE SMARTVIEW) meter device kit Use to test blood sugar 3-4 times daily, as directed.     blood glucose monitoring (ONE TOUCH DELICA) lancets Use to test blood sugars 2 times daily.     calcium carbonate 600 mg-vitamin D 400 units (CALTRATE) 600-400 MG-UNIT per tablet Take 1 tablet by mouth 2 times daily (with meals)     ciprofloxacin (CIPRO) 500 MG tablet Take 1 tablet (500 mg) by mouth every 12 hours for 26 days     Continuous Blood Gluc Sensor (FREESTYLE JEREMY 14 DAY SENSOR) MISC Change every 14 days.     Continuous Blood Gluc Sensor (FREESTYLE JEREMY 14 DAY SENSOR) MISC Change every 14 days.     fondaparinux ANTICOAGULANT (ARIXTRA) 7.5MG/0.6ML injection Inject 0.6 mLs (7.5 mg) Subcutaneous every 24 hours Hold dose on the mornings of your right heart catheterization and biopsies     furosemide (LASIX) 20 MG tablet Take 1 tablet (20 mg) by mouth daily     hydrALAZINE (APRESOLINE) 100 MG tablet Take 1 tablet (100 mg) by mouth every 8 hours     insulin aspart (NOVOLOG PEN) 100 UNIT/ML pen Inject 0-31 Units Subcutaneous 3 times daily (with meals) Custom MEAL and SNACK corrective dosing     BG less than 80, do not give Novolog.  BG , give  15 units.  -169, give  17 units.  -199 give 19 units.  -229 give 21 units.  -259 give 23 units.  -289 give 25 units.  -319 give 27 units.  -349 give 29 units.  BG >/= 350 give 31 units.  To be given with meal based on pre-meal blood glucose     insulin isophane human (HUMULIN N PEN) 100 UNIT/ML injection  Inject 8 Units Subcutaneous At Bedtime     insulin isophane human (HUMULIN N PEN) 100 UNIT/ML injection Inject 40 Units Subcutaneous every morning     insulin pen needle (32G X 4 MM) 32G X 4 MM miscellaneous Use as directed by provider  Per insurance coverage     insulin pen needle (B-D U/F) 31G X 5 MM miscellaneous 1 Units by Device route 2 times daily Use 2 pen needles daily or as directed.     insulin pen needle (NOVOFINE) 30G X 8 MM miscellaneous Inject 1 Box Subcutaneous 6 times daily Use 6 pen needles daily or as directed.     magnesium oxide 400 MG CAPS Take 400 mg by mouth 2 times daily     mycophenolate (GENERIC EQUIVALENT) 250 MG capsule Take 2 capsules (500 mg) by mouth 2 times daily     order for DME Auto CPAP 8-15 cmH20     oxyCODONE (ROXICODONE) 5 MG tablet Take 1 tablet (5 mg) by mouth every 4 hours as needed for moderate to severe pain     pantoprazole (PROTONIX) 40 MG EC tablet Take 1 tablet (40 mg) by mouth every morning (before breakfast)     polyethylene glycol (MIRALAX) 17 g packet 17 g by Oral or Feeding Tube route daily as needed for constipation     rosuvastatin (CRESTOR) 10 MG tablet Take 1 tablet (10 mg) by mouth daily     senna-docusate (SENOKOT-S/PERICOLACE) 8.6-50 MG tablet Take 1 tablet by mouth 2 times daily (hold for loose stools)     sodium hypochlorite (DAKINS) 0.5 % external solution Apply topically 3 times daily with dressing changes     sulfamethoxazole-trimethoprim (BACTRIM) 400-80 MG tablet Take 1 tablet by mouth daily     tacrolimus (GENERIC EQUIVALENT) 1 MG capsule Take 4 capsules (4 mg) by mouth every morning AND 3 capsules (3 mg) every evening. Take 4mg every AM and 3mg every PM..     tamsulosin (FLOMAX) 0.4 MG capsule Take 1 capsule (0.4 mg) by mouth daily     No current facility-administered medications for this visit.        Allergies:  Allergies   Allergen Reactions     Heparin Heparin Induced Thrombocytopenia     HIT screen sent 7/18/2020. CORRINE confirmed positive        ROS:  Denies any bleeding issues. No gum bleeding, No nose bleed. Denies any hematuria or blood in stools. Denies any ecchymosis. Denies any lower extremities swelling or pain. Denies any fever, no chest pain. Denies any shortness of breath.     Objective:  No distress, no cough or shortness of breath.  Affect is normal    Labs:  HIT screen is strongly positive on 7/18/20 at 2.9 (ULN 1.0)  HIT CORRINE test 7/18/20 is strongly positive confirming the diagnosis    Imaging:  Reviewed his July ultrasounds    Assessment:  In summary, Lucian Henderson Sr. is a 65 year old man with history for confirmed HIT with thrombosis.  His thrombi have been treated with 3 months of anticoagluation and I recommended testing to confirm the abscence of HIT antibodies before stopping anticoagulation.    Plan:  1. Continue fondaparinux until we call with lab results  2. HIT reflect ordered for Monday 10/26/20  3. We will call with results and instructions for the anticoagulation    The patient is given our center's contact information and is instructed to call if he should have any further questions or concerns.      Uriel Hopkins MD   of Medicine  [employer]

## 2020-10-26 DIAGNOSIS — Z94.1 HEART TRANSPLANT, ORTHOTOPIC, STATUS (H): ICD-10-CM

## 2020-10-26 DIAGNOSIS — Z94.1 HEART REPLACED BY TRANSPLANT (H): ICD-10-CM

## 2020-10-26 DIAGNOSIS — Z86.2 HISTORY OF HEPARIN-INDUCED THROMBOCYTOPENIA: ICD-10-CM

## 2020-10-26 LAB
ANION GAP SERPL CALCULATED.3IONS-SCNC: 7 MMOL/L (ref 3–14)
BUN SERPL-MCNC: 23 MG/DL (ref 7–30)
CALCIUM SERPL-MCNC: 8.6 MG/DL (ref 8.5–10.1)
CHLORIDE SERPL-SCNC: 103 MMOL/L (ref 94–109)
CO2 SERPL-SCNC: 25 MMOL/L (ref 20–32)
CREAT SERPL-MCNC: 1.57 MG/DL (ref 0.66–1.25)
GFR SERPL CREATININE-BSD FRML MDRD: 45 ML/MIN/{1.73_M2}
GLUCOSE SERPL-MCNC: 137 MG/DL (ref 70–99)
POTASSIUM SERPL-SCNC: 4.4 MMOL/L (ref 3.4–5.3)
SODIUM SERPL-SCNC: 135 MMOL/L (ref 133–144)

## 2020-10-26 PROCEDURE — 36415 COLL VENOUS BLD VENIPUNCTURE: CPT | Performed by: INTERNAL MEDICINE

## 2020-10-26 PROCEDURE — 85004 AUTOMATED DIFF WBC COUNT: CPT | Performed by: INTERNAL MEDICINE

## 2020-10-26 PROCEDURE — 86022 PLATELET ANTIBODIES: CPT | Performed by: INTERNAL MEDICINE

## 2020-10-26 PROCEDURE — 85048 AUTOMATED LEUKOCYTE COUNT: CPT | Performed by: INTERNAL MEDICINE

## 2020-10-26 PROCEDURE — 80197 ASSAY OF TACROLIMUS: CPT | Performed by: INTERNAL MEDICINE

## 2020-10-26 PROCEDURE — 80048 BASIC METABOLIC PNL TOTAL CA: CPT | Performed by: INTERNAL MEDICINE

## 2020-10-27 ENCOUNTER — OFFICE VISIT (OUTPATIENT)
Dept: CARDIOLOGY | Facility: CLINIC | Age: 65
End: 2020-10-27
Attending: SURGERY
Payer: COMMERCIAL

## 2020-10-27 ENCOUNTER — DOCUMENTATION ONLY (OUTPATIENT)
Dept: CARE COORDINATION | Facility: CLINIC | Age: 65
End: 2020-10-27

## 2020-10-27 ENCOUNTER — TELEPHONE (OUTPATIENT)
Dept: TRANSPLANT | Facility: CLINIC | Age: 65
End: 2020-10-27

## 2020-10-27 ENCOUNTER — APPOINTMENT (OUTPATIENT)
Dept: INTERPRETER SERVICES | Facility: CLINIC | Age: 65
End: 2020-10-27
Payer: COMMERCIAL

## 2020-10-27 VITALS
DIASTOLIC BLOOD PRESSURE: 70 MMHG | OXYGEN SATURATION: 98 % | SYSTOLIC BLOOD PRESSURE: 123 MMHG | WEIGHT: 186 LBS | BODY MASS INDEX: 31.76 KG/M2 | HEART RATE: 98 BPM | HEIGHT: 64 IN

## 2020-10-27 DIAGNOSIS — Z94.1 HEART TRANSPLANT, ORTHOTOPIC, STATUS (H): ICD-10-CM

## 2020-10-27 DIAGNOSIS — A49.8 PSEUDOMONAS INFECTION: Primary | ICD-10-CM

## 2020-10-27 LAB
PF4 HEPARIN CMPLX AB SER QL: NEGATIVE
TACROLIMUS BLD-MCNC: 6.6 UG/L (ref 5–15)
TME LAST DOSE: NORMAL H

## 2020-10-27 PROCEDURE — 99024 POSTOP FOLLOW-UP VISIT: CPT | Performed by: PHYSICIAN ASSISTANT

## 2020-10-27 PROCEDURE — G0463 HOSPITAL OUTPT CLINIC VISIT: HCPCS

## 2020-10-27 RX ORDER — TACROLIMUS 1 MG/1
CAPSULE ORAL
Qty: 210 CAPSULE | Refills: 11 | Status: SHIPPED | OUTPATIENT
Start: 2020-10-27 | End: 2020-11-05

## 2020-10-27 ASSESSMENT — MIFFLIN-ST. JEOR: SCORE: 1529.69

## 2020-10-27 ASSESSMENT — PAIN SCALES - GENERAL: PAINLEVEL: NO PAIN (0)

## 2020-10-27 NOTE — LETTER
10/27/2020      RE: Lucian Henderson Sr.  [address] [facility] [address]       Dear Colleague,    Thank you for the opportunity to participate in the care of your patient, Lucian Henderson Sr., at the [facility] HEART [facility] at [facility]. Please see a copy of my visit note below.    Reason for visit: Post-Op heart transplant with pacer pocket infection s/p debridement on 9/23 and 9/28 by Dr. Huertas and left chest tube site infection, both with pseudomonas.      HPI: Lucian Henderson Sr. is a 65 year old male seen in clinic for wound check. Patient reports his wife has been packing left pacer pocket wound daily with nugauze and applying dressing. Wound vac has been removed from left supraclavicular wound. He reports wounds healing well. No drainage, erythema, swelling, pain. He denies fevers, chills, or sweats.     He is currently on Ciprofloxacin and will complete his antibiotics this Friday.       PAST MEDICAL HISTORY:  Past Medical History:   Diagnosis Date     CAD (coronary artery disease)      CHF (congestive heart failure) (H)      CKD (chronic kidney disease), stage III      Cortical cataract of both eyes      Diabetes (H)      Hyperlipidemia      Hypertension      Ischemic cardiomyopathy      Obesity      CHANTEL (obstructive sleep apnea)     occas cpap     Osteoarthritis        PAST SURGICAL HISTORY:  Past Surgical History:   Procedure Laterality Date     C CABG, ARTERY-VEIN, THREE  02/2008     CATARACT IOL, RT/LT       COLONOSCOPY N/A 8/7/2019    Procedure: COLONOSCOPY, WITH POLYPECTOMY AND BIOPSY;  Surgeon: Chauncey Morataya MD;  Location:  GI     CV ANGIOGRAM CORONARY GRAFT N/A 7/2/2020    Procedure: Angiogram Coronary Graft;  Surgeon: Alex Lantigua MD;  Location:  HEART CARDIAC CATH LAB     CV CORONARY ANGIOGRAM N/A 7/2/2020    Procedure: CV CORONARY ANGIOGRAM;  Surgeon: Alex Lantigua MD;  Location:  [facility] HEART CARDIAC CATH LAB     CV HEART BIOPSY N/A 7/27/2020    Procedure: Heart Biopsy;  Surgeon: Mario Burr MD;  Location: [facility] HEART CARDIAC CATH LAB     CV HEART BIOPSY N/A 8/3/2020    Procedure: CV HEART BIOPSY;  Surgeon: Alex Lantigua MD;  Location: [facility] HEART CARDIAC CATH LAB     CV HEART BIOPSY N/A 8/10/2020    Procedure: CV HEART BIOPSY;  Surgeon: Mario Burr MD;  Location: [facility] HEART CARDIAC CATH LAB     CV HEART BIOPSY N/A 8/17/2020    Procedure: CV HEART BIOPSY;  Surgeon: Raimundo Hudson MD;  Location:  HEART CARDIAC CATH LAB     CV HEART BIOPSY N/A 8/31/2020    Procedure: CV HEART BIOPSY;  Surgeon: Moises Santos MD;  Location:  HEART CARDIAC CATH LAB     CV HEART BIOPSY N/A 9/14/2020    Procedure: CV HEART BIOPSY;  Surgeon: Raimundo Hudson MD;  Location: [facility] HEART CARDIAC CATH LAB     CV HEART BIOPSY N/A 9/28/2020    Procedure: CV HEART BIOPSY;  Surgeon: Raimundo Hudson MD;  Location:  HEART CARDIAC CATH LAB     CV HEART BIOPSY N/A 10/12/2020    Procedure: CV HEART BIOPSY;  Surgeon: John Stuart MD;  Location:  HEART CARDIAC CATH LAB     CV INTRA-AORTIC BALLOON PUMP INSERTION N/A 7/14/2020    Procedure: RHC WITH LEAVE IN SWAN/IABP;  Surgeon: Sonny Saleh MD;  Location:  HEART CARDIAC CATH LAB     CV RIGHT HEART CATH N/A 3/25/2019    Procedure: CV RIGHT HEART CATH;  Surgeon: Moises Santos MD;  Location:  HEART CARDIAC CATH LAB     CV RIGHT HEART CATH N/A 7/10/2019    Procedure: CV RIGHT HEART CATH;  Surgeon: Jak Mccabe MD;  Location:  HEART CARDIAC CATH LAB     CV RIGHT HEART CATH N/A 7/8/2020    Procedure: Right Heart Cath with Leave In swan already has ICU load;  Surgeon: Jak Mccabe MD;  Location:  HEART CARDIAC CATH LAB     CV RIGHT HEART CATH N/A 7/14/2020    Procedure: CV RIGHT HEART CATH;  Surgeon: Sonny Saleh MD;  Location:  HEART CARDIAC CATH LAB     CV RIGHT HEART CATH N/A  7/2/2020    Procedure: CV RIGHT HEART CATH;  Surgeon: Alex Lantigua MD;  Location:  HEART CARDIAC CATH LAB     CV RIGHT HEART CATH N/A 7/27/2020    Procedure: Right Heart Cath;  Surgeon: Mario Burr MD;  Location:  HEART CARDIAC CATH LAB     CV RIGHT HEART CATH N/A 8/3/2020    Procedure: Right Heart Cath;  Surgeon: Alex Lantigua MD;  Location:  HEART CARDIAC CATH LAB     CV RIGHT HEART CATH N/A 8/10/2020    Procedure: CV RIGHT HEART CATH;  Surgeon: Mairo Burr MD;  Location:  HEART CARDIAC CATH LAB     CV RIGHT HEART CATH N/A 8/17/2020    Procedure: CV RIGHT HEART CATH;  Surgeon: Raimundo Hudson MD;  Location:  HEART CARDIAC CATH LAB     CV RIGHT HEART CATH N/A 8/31/2020    Procedure: CV RIGHT HEART CATH;  Surgeon: Moises Santos MD;  Location:  HEART CARDIAC CATH LAB     CV RIGHT HEART CATH N/A 9/14/2020    Procedure: CV RIGHT HEART CATH;  Surgeon: Raimundo Hudson MD;  Location:  HEART CARDIAC CATH LAB     CV RIGHT HEART CATH N/A 9/28/2020    Procedure: CV RIGHT HEART CATH;  Surgeon: Raimundo Hudson MD;  Location:  HEART CARDIAC CATH LAB     CV RIGHT HEART CATH N/A 10/12/2020    Procedure: CV RIGHT HEART CATH;  Surgeon: John Stuart MD;  Location:  HEART CARDIAC CATH LAB     EXTRACTION(S) DENTAL Left 7/13/2020    Procedure: EXTRACTION, TOOTH #11, 12, 13, 15, and 29;  Surgeon: Monica Chao DDS;  Location: [facility] OR     IMPLANT AUTOMATIC IMPLANTABLE CARDIOVERTER DEFIBRILLATOR       INCISION AND DRAINAGE STERNUM W/ IRRIGATION SYSTEM, COMBINED N/A 9/23/2020    Procedure: INCISION AND DRAINAGE, LEFT SUPRACLAVICULAR WOUND INFECTION AND ABDOMENN WOUND.;  Surgeon: Ran Huertas MD;  Location: [facility] OR     INSERT INTRAAORTIC BALLOON PUMP N/A 7/16/2020    Procedure: Subclavian Intra-Aortic Balloon Pump Placement;  Surgeon: Allan Sparrow MD;  [facility]: [facility] OR     IRRIGATION AND DEBRIDEMENT CHEST WASHOUT, COMBINED N/A 9/28/2020     Procedure: IRRIGATION AND DEBRIDEMENT OF LEFT UPPER CHEST WOUND.;  Surgeon: Ran Huertas MD;  Location: [facility] OR     PHACOEMULSIFICATION CLEAR CORNEA WITH STANDARD IOL, VITRECTOMY PARSPLANA 25 GAGUE, COMBINED Left 12/10/2019    Procedure: PHACOEMULSIFICATION, CATARACT, CLEAR CORNEAL INCISION APPROACH, W STD INTRAOCULAR LENS IMPLANT INSERT + VITRECTOMY BY PARS PLANA  USING 25-GAUGE INSTRUMENTS. ENDOLASER, INFUSION OF 20% SF6 GAS;  Surgeon: Triny Bunch MD;  Location: [facility] OR     PICC DOUBLE LUMEN PLACEMENT Left 07/28/2020    5Fr - 42cm, Basilic vein, mid SVC     PICC INSERTION Right 07/11/2020    basilic 44 cm total      TRANSPLANT HEART RECIPIENT N/A 7/19/2020    Procedure: REDO MEDIAN STERNOTOMY, TRANSPLANT, ORTHOTOPIC HEART, RECIPIENT, ON PUMP OXYGENATOR, REMOVAL OF CARDIAC DEFIBRILLATOR AND LEAD;  Surgeon: Griselli, Massimo, MD;  Location: [facility] OR       CURRENT MEDICATIONS:   Current Outpatient Medications:      acetaminophen (TYLENOL) 325 MG tablet, Take 3 tablets (975 mg) by mouth every 8 hours as needed for mild pain, Disp: 60 tablet, Rfl: 3     Alcohol Swabs PADS, Use to swab the area of the injection or sergio as directed  Per insurance coverage, Disp: 100 each, Rfl: 0     aspirin (ASA) 81 MG chewable tablet, Take 1 tablet (81 mg) by mouth daily, Disp: 120 tablet, Rfl: 0     blood glucose (NO BRAND SPECIFIED) test strip, Use to test blood sugar 2 times daily or as directed., Disp: 200 strip, Rfl: 3     blood glucose monitoring (ACCU-CHEK FELIPE SMARTVIEW) meter device kit, Use to test blood sugar 3-4 times daily, as directed., Disp: 1 kit, Rfl: 0     blood glucose monitoring (ONE TOUCH DELICA) lancets, Use to test blood sugars 2 times daily., Disp: 200 each, Rfl: 3     calcium carbonate 600 mg-vitamin D 400 units (CALTRATE) 600-400 MG-UNIT per tablet, Take 1 tablet by mouth 2 times daily (with meals), Disp: 180 tablet, Rfl: 3     ciprofloxacin (CIPRO) 500 MG tablet, Take 1 tablet (500 mg) by mouth  every 12 hours for 26 days, Disp: 52 tablet, Rfl: 0     Continuous Blood Gluc Sensor (FREESTYLE JEREMY 14 DAY SENSOR) [facility], Change every 14 days., Disp: 2 each, Rfl: 11     Continuous Blood Gluc Sensor (FREESTYLE JEREMY 14 DAY SENSOR) [facility], Change every 14 days., Disp: 6 each, Rfl: 4     furosemide (LASIX) 20 MG tablet, Take 1 tablet (20 mg) by mouth daily, Disp: 90 tablet, Rfl: 3     hydrALAZINE (APRESOLINE) 100 MG tablet, Take 1 tablet (100 mg) by mouth every 8 hours, Disp: 90 tablet, Rfl: 0     insulin aspart (NOVOLOG PEN) 100 UNIT/ML pen, Inject 0-31 Units Subcutaneous 3 times daily (with meals) Custom MEAL and SNACK corrective dosing    BG less than 80, do not give Novolog. BG , give  15 units. -169, give  17 units. -199 give 19 units. -229 give 21 units. -259 give 23 units. -289 give 25 units. -319 give 27 units. -349 give 29 units. BG >/= 350 give 31 units. To be given with meal based on pre-meal blood glucose, Disp: 6 mL, Rfl: 0     insulin isophane human (HUMULIN N PEN) 100 UNIT/ML injection, Inject 8 Units Subcutaneous At Bedtime, Disp: 3 mL, Rfl: 1     insulin isophane human (HUMULIN N PEN) 100 UNIT/ML injection, Inject 40 Units Subcutaneous every morning, Disp: 6 mL, Rfl: 1     insulin pen needle (32G X 4 MM) 32G X 4 MM miscellaneous, Use as directed by provider Per insurance coverage, Disp: 100 each, Rfl: 0     insulin pen needle (B-D U/F) 31G X 5 MM miscellaneous, 1 Units by Device route 2 times daily Use 2 pen needles daily or as directed., Disp: 100 each, Rfl: 3     insulin pen needle (NOVOFINE) 30G X 8 MM miscellaneous, Inject 1 Box Subcutaneous 6 times daily Use 6 pen needles daily or as directed., Disp: 200 each, Rfl: 3     magnesium oxide 400 MG CAPS, Take 400 mg by mouth 2 times daily, Disp: 180 capsule, Rfl: 3     mycophenolate (GENERIC EQUIVALENT) 250 MG capsule, Take 2 capsules (500 mg) by mouth 2 times daily, Disp: 120 capsule, Rfl: 0     order  for DME, Auto CPAP 8-15 cmH20, Disp: 1 Units, Rfl: 0     oxyCODONE (ROXICODONE) 5 MG tablet, Take 1 tablet (5 mg) by mouth every 4 hours as needed for moderate to severe pain, Disp: 15 tablet, Rfl: 0     pantoprazole (PROTONIX) 40 MG EC tablet, Take 1 tablet (40 mg) by mouth every morning (before breakfast), Disp: 90 tablet, Rfl: 3     polyethylene glycol (MIRALAX) 17 g packet, 17 g by Oral or Feeding Tube route daily as needed for constipation, Disp: 7 packet, Rfl: 0     rosuvastatin (CRESTOR) 10 MG tablet, Take 1 tablet (10 mg) by mouth daily, Disp: 90 tablet, Rfl: 3     senna-docusate (SENOKOT-S/PERICOLACE) 8.6-50 MG tablet, Take 1 tablet by mouth 2 times daily (hold for loose stools), Disp: 60 tablet, Rfl: 0     sodium hypochlorite (DAKINS) 0.5 % external solution, Apply topically 3 times daily with dressing changes, Disp: 473 mL, Rfl: 0     sulfamethoxazole-trimethoprim (BACTRIM) 400-80 MG tablet, Take 1 tablet by mouth daily, Disp: 90 tablet, Rfl: 3     tamsulosin (FLOMAX) 0.4 MG capsule, Take 1 capsule (0.4 mg) by mouth daily, Disp: 60 capsule, Rfl: 0     tacrolimus (GENERIC EQUIVALENT) 1 MG capsule, Take 4 capsules (4 mg) by mouth every morning AND 4 capsules (4 mg) every evening., Disp: 210 capsule, Rfl: 11    ALLERGIES:    Allergies   Allergen Reactions     Heparin Heparin Induced Thrombocytopenia     HIT screen sent 7/18/2020. CORRINE confirmed positive       LABS:  CBC RESULTS:  Lab Results   Component Value Date    WBC 7.3 10/26/2020    RBC 3.08 (L) 10/20/2020    HGB 9.2 (L) 10/20/2020    HCT 30.5 (L) 10/20/2020    MCV 99 10/20/2020    MCH 29.9 10/20/2020    MCHC 30.2 (L) 10/20/2020    RDW 17.8 (H) 10/20/2020     10/20/2020       BMP RESULTS:  Lab Results   Component Value Date     10/26/2020    POTASSIUM 4.4 10/26/2020    CHLORIDE 103 10/26/2020    CO2 25 10/26/2020    ANIONGAP 7 10/26/2020     (H) 10/26/2020    BUN 23 10/26/2020    CR 1.57 (H) 10/26/2020    GFRESTIMATED 45 (L)  "10/26/2020    GFRESTBLACK 52 (L) 10/26/2020    BRYANNA 8.6 10/26/2020        INR RESULTS:  Lab Results   Component Value Date    INR 1.06 10/12/2020         PHYSICAL EXAM:   /70 (BP Location: Right arm, Patient Position: Chair, Cuff Size: Adult Large)   Pulse 98   Ht 1.626 m (5' 4\")   Wt 84.4 kg (186 lb)   SpO2 98%   BMI 31.93 kg/m    General: alert and oriented x 3, pleasant, no acute distress, normal mood and affect  Incision: left pacer pocket wound filled with pink granulation tissue, no depth, no erythema, no drainage. Sternal incision clean dry and intact without erythema, swelling or drainage. Left old chest tube site also filled with pink granulation tissue and eschar.       ASSESSMENT/PLAN:  Lucian is a 65 year old male  Status post heart transplant with pacer pocket infection s/p debridement who returns to clinic for postop visit.    1. Wounds have healed well. No further packing necessary. Wound care discussed. Clean with microklenz spray and cover with gauze until completely healed.   2. Patient will finish his course of Ciprofloxacin this Friday. No further antibiotics needed.        Approximately 25 minutes spent with the patient in clinic at this visit.    Claudia Chapman PA-C  Cardiothoracic Surgery  Pager [phone]  October 30, 2020    CC  Patient Care Team:  No Ref-Primary, Physician as PCP - General  Diane Watts APRN CNP as Nurse Practitioner (Cardiology)  Arvin Sheridan MD as MD (Cardiology)  Yue Dowd MD as MD (INTERNAL MEDICINE - ENDOCRINOLOGY, DIABETES & METABOLISM)  Edita Espitia MD as Assigned PCP  Christelle Pettit RN as Transplant Coordinator (Cardiology)  Negrito Rai [facility] as Pharmacist (Pharmacist)  [facility] ([facility] ([facility]), ([place]))  Denita Bueno MD as Assigned Palliative Care Provider  Triny Bunch MD as Assigned Surgical Provider  Arvin Sheridan MD as Assigned Heart and Vascular " Provider  SELF, REFERRED      Please do not hesitate to contact me if you have any questions/concerns.     Sincerely,     VIDEO,

## 2020-10-27 NOTE — TELEPHONE ENCOUNTER
Pt called using .  Tacrolimus level 6.6.  Goal 8-10.  Pt to increase dose to 4mg BID and recheck a level in one week.  Pt states understanding.  VM also left with pt's daughter, Elisabeth, with above information.

## 2020-10-28 ENCOUNTER — APPOINTMENT (OUTPATIENT)
Dept: INTERPRETER SERVICES | Facility: CLINIC | Age: 65
End: 2020-10-28
Payer: COMMERCIAL

## 2020-10-28 ENCOUNTER — TELEPHONE (OUTPATIENT)
Dept: HEMATOLOGY | Facility: CLINIC | Age: 65
End: 2020-10-28

## 2020-10-28 NOTE — TELEPHONE ENCOUNTER
Called Lucian Henderson SrMerly Using  to let him know that his HIT antibody is now negative and per Dr. Hopkins, Lucian can now stop the fondaprinux injections.  Lucian verbalized understanding and was thrilled with the news.    Aydee Avery, RN - Nurse Clinician - [facility] - [phone]

## 2020-10-30 LAB
BASOPHILS # BLD AUTO: 0.1 10E9/L (ref 0–0.2)
BASOPHILS NFR BLD AUTO: 1 %
DIFFERENTIAL METHOD BLD: NORMAL
EOSINOPHIL # BLD AUTO: 0.1 10E9/L (ref 0–0.7)
EOSINOPHIL NFR BLD AUTO: 2 %
LYMPHOCYTES # BLD AUTO: 1 10E9/L (ref 0.8–5.3)
LYMPHOCYTES NFR BLD AUTO: 14 %
MONOCYTES # BLD AUTO: 0.7 10E9/L (ref 0–1.3)
MONOCYTES NFR BLD AUTO: 10 %
NEUTROPHILS # BLD AUTO: 5.4 10E9/L (ref 1.6–8.3)
NEUTROPHILS NFR BLD AUTO: 73 %
WBC # BLD AUTO: 7.3 10E9/L (ref 4–11)

## 2020-10-30 NOTE — PROGRESS NOTES
Reason for visit: Post-Op heart transplant with pacer pocket infection s/p debridement on 9/23 and 9/28 by Dr. Huertas and left chest tube site infection, both with pseudomonas.      HPI: Lucian Henderson Sr. is a 65 year old male seen in clinic for wound check. Patient reports his wife has been packing left pacer pocket wound daily with nugauze and applying dressing. Wound vac has been removed from left supraclavicular wound. He reports wounds healing well. No drainage, erythema, swelling, pain. He denies fevers, chills, or sweats.     He is currently on Ciprofloxacin and will complete his antibiotics this Friday.       PAST MEDICAL HISTORY:  Past Medical History:   Diagnosis Date     CAD (coronary artery disease)      CHF (congestive heart failure) (H)      CKD (chronic kidney disease), stage III      Cortical cataract of both eyes      Diabetes (H)      Hyperlipidemia      Hypertension      Ischemic cardiomyopathy      Obesity      CHANTEL (obstructive sleep apnea)     occas cpap     Osteoarthritis        PAST SURGICAL HISTORY:  Past Surgical History:   Procedure Laterality Date     C CABG, ARTERY-VEIN, THREE  02/2008     CATARACT IOL, RT/LT       COLONOSCOPY N/A 8/7/2019    Procedure: COLONOSCOPY, WITH POLYPECTOMY AND BIOPSY;  Surgeon: Chauncey Morataya MD;  Location:  GI     CV ANGIOGRAM CORONARY GRAFT N/A 7/2/2020    Procedure: Angiogram Coronary Graft;  Surgeon: Alex Lantigua MD;  Location:  HEART CARDIAC CATH LAB     CV CORONARY ANGIOGRAM N/A 7/2/2020    Procedure: CV CORONARY ANGIOGRAM;  Surgeon: Alex Lantigua MD;  Location:  HEART CARDIAC CATH LAB     CV HEART BIOPSY N/A 7/27/2020    Procedure: Heart Biopsy;  Surgeon: Mario Burr MD;  Location:  HEART CARDIAC CATH LAB     CV HEART BIOPSY N/A 8/3/2020    Procedure: CV HEART BIOPSY;  Surgeon: Alex Lantigua MD;  Location:  HEART CARDIAC CATH LAB     CV HEART BIOPSY N/A 8/10/2020    Procedure: CV HEART BIOPSY;   Surgeon: Mario Burr MD;  [facility]     CV HEART BIOPSY N/A 8/17/2020    Procedure: CV HEART BIOPSY;  Surgeon: Raimundo Hudson MD;  [facility]     CV HEART BIOPSY N/A 8/31/2020    Procedure: CV HEART BIOPSY;  Surgeon: Moises Santos MD;  [facility]     CV HEART BIOPSY N/A 9/14/2020    Procedure: CV HEART BIOPSY;  Surgeon: Raimundo Hudson MD;  [facility]     CV HEART BIOPSY N/A 9/28/2020    Procedure: CV HEART BIOPSY;  Surgeon: Raimundo Hudson MD;  [facility]     CV HEART BIOPSY N/A 10/12/2020    Procedure: CV HEART BIOPSY;  Surgeon: John Stuart MD;  Location:  HEART CARDIAC CATH LAB     CV INTRA-AORTIC BALLOON PUMP INSERTION N/A 7/14/2020    Procedure: RHC WITH LEAVE IN SWAN/IABP;  Surgeon: Sonny Saleh MD;  [facility]     CV RIGHT HEART CATH N/A 3/25/2019    Procedure: CV RIGHT HEART CATH;  Surgeon: Moises Santos MD;  Location:  HEART CARDIAC CATH LAB     CV RIGHT HEART CATH N/A 7/10/2019    Procedure: CV RIGHT HEART CATH;  Surgeon: Jak Mccabe MD;  Location:  HEART CARDIAC CATH LAB     CV RIGHT HEART CATH N/A 7/8/2020    Procedure: Right Heart Cath with Leave In swan already has ICU load;  Surgeon: Jak Mccabe MD;  [facility]     CV RIGHT HEART CATH N/A 7/14/2020    Procedure: CV RIGHT HEART CATH;  Surgeon: Sonny Saleh MD;  [facility]     CV RIGHT HEART CATH N/A 7/2/2020    Procedure: CV RIGHT HEART CATH;  Surgeon: Alex Lantigua MD;  [facility]     CV RIGHT HEART CATH N/A 7/27/2020    Procedure: Right Heart Cath;  Surgeon: Mario Burr MD;  Location:  HEART CARDIAC CATH LAB     CV RIGHT HEART CATH N/A 8/3/2020    Procedure: Right Heart Cath;  Surgeon: Alex Lantigua MD;   [facility]     CV RIGHT HEART CATH N/A 8/10/2020    Procedure: CV RIGHT HEART CATH;  Surgeon: Mario Burr MD;  Location:  HEART CARDIAC CATH LAB     CV RIGHT HEART CATH N/A 8/17/2020    Procedure: CV RIGHT HEART CATH;  Surgeon: Raimundo Hudson MD;  Location:  HEART CARDIAC CATH LAB     CV RIGHT HEART CATH N/A 8/31/2020    Procedure: CV RIGHT HEART CATH;  Surgeon: Moises Santos MD;  Location:  HEART CARDIAC CATH LAB     CV RIGHT HEART CATH N/A 9/14/2020    Procedure: CV RIGHT HEART CATH;  Surgeon: Raimundo Hudson MD;  Location:  HEART CARDIAC CATH LAB     CV RIGHT HEART CATH N/A 9/28/2020    Procedure: CV RIGHT HEART CATH;  Surgeon: Raimundo Hudson MD;  Location:  HEART CARDIAC CATH LAB     CV RIGHT HEART CATH N/A 10/12/2020    Procedure: CV RIGHT HEART CATH;  Surgeon: John Stuart MD;  Location:  HEART CARDIAC CATH LAB     EXTRACTION(S) DENTAL Left 7/13/2020    Procedure: EXTRACTION, TOOTH #11, 12, 13, 15, and 29;  Surgeon: Monica Chao DDS;  [facility]     IMPLANT AUTOMATIC IMPLANTABLE CARDIOVERTER DEFIBRILLATOR       INCISION AND DRAINAGE STERNUM W/ IRRIGATION SYSTEM, COMBINED N/A 9/23/2020    Procedure: INCISION AND DRAINAGE, LEFT SUPRACLAVICULAR WOUND INFECTION AND ABDOMENN WOUND.;  Surgeon: Ran Huertas MD;  [facility]     INSERT INTRAAORTIC BALLOON PUMP N/A 7/16/2020    Procedure: Subclavian Intra-Aortic Balloon Pump Placement;  Surgeon: Allan Sparrow MD;  [facility]     IRRIGATION AND DEBRIDEMENT CHEST WASHOUT, COMBINED N/A 9/28/2020    Procedure: IRRIGATION AND DEBRIDEMENT OF LEFT UPPER CHEST WOUND.;  Surgeon: Ran Huertas MD;  [facility]     PHACOEMULSIFICATION CLEAR CORNEA WITH STANDARD IOL, VITRECTOMY PARSPLANA 25 GAGUE, COMBINED Left 12/10/2019    Procedure: PHACOEMULSIFICATION, CATARACT, CLEAR CORNEAL INCISION APPROACH, W STD INTRAOCULAR LENS IMPLANT INSERT + VITRECTOMY BY PARS  PLANA  USING 25-GAUGE INSTRUMENTS. ENDOLASER, INFUSION OF 20% SF6 GAS;  Surgeon: Triny Bunch MD;  [facility]     PICC DOUBLE LUMEN PLACEMENT Left 07/28/2020    5Fr - 42cm, Basilic vein, mid SVC     PICC INSERTION Right 07/11/2020    basilic 44 cm total      TRANSPLANT HEART RECIPIENT N/A 7/19/2020    Procedure: REDO MEDIAN STERNOTOMY, TRANSPLANT, ORTHOTOPIC HEART, RECIPIENT, ON PUMP OXYGENATOR, REMOVAL OF CARDIAC DEFIBRILLATOR AND LEAD;  Surgeon: Griselli, Massimo, MD;  [facility]       CURRENT MEDICATIONS:   Current Outpatient Medications:      acetaminophen (TYLENOL) 325 MG tablet, Take 3 tablets (975 mg) by mouth every 8 hours as needed for mild pain, Disp: 60 tablet, Rfl: 3     Alcohol Swabs PADS, Use to swab the area of the injection or sergio as directed  Per insurance coverage, Disp: 100 each, Rfl: 0     aspirin (ASA) 81 MG chewable tablet, Take 1 tablet (81 mg) by mouth daily, Disp: 120 tablet, Rfl: 0     blood glucose (NO BRAND SPECIFIED) test strip, Use to test blood sugar 2 times daily or as directed., Disp: 200 strip, Rfl: 3     blood glucose monitoring (ACCU-CHEK FELIPE SMARTVIEW) meter device kit, Use to test blood sugar 3-4 times daily, as directed., Disp: 1 kit, Rfl: 0     blood glucose monitoring (ONE TOUCH DELICA) lancets, Use to test blood sugars 2 times daily., Disp: 200 each, Rfl: 3     calcium carbonate 600 mg-vitamin D 400 units (CALTRATE) 600-400 MG-UNIT per tablet, Take 1 tablet by mouth 2 times daily (with meals), Disp: 180 tablet, Rfl: 3     ciprofloxacin (CIPRO) 500 MG tablet, Take 1 tablet (500 mg) by mouth every 12 hours for 26 days, Disp: 52 tablet, Rfl: 0     Continuous Blood Gluc Sensor (FREESTYLE JEREMY 14 DAY SENSOR) MISC, Change every 14 days., Disp: 2 each, Rfl: 11     Continuous Blood Gluc Sensor (FREESTYLE JEREMY 14 DAY SENSOR) MISC, Change every 14 days., Disp: 6 each, Rfl: 4     furosemide (LASIX) 20 MG tablet, Take 1 tablet (20 mg) by mouth daily, Disp: 90  tablet, Rfl: 3     hydrALAZINE (APRESOLINE) 100 MG tablet, Take 1 tablet (100 mg) by mouth every 8 hours, Disp: 90 tablet, Rfl: 0     insulin aspart (NOVOLOG PEN) 100 UNIT/ML pen, Inject 0-31 Units Subcutaneous 3 times daily (with meals) Custom MEAL and SNACK corrective dosing    BG less than 80, do not give Novolog. BG , give  15 units. -169, give  17 units. -199 give 19 units. -229 give 21 units. -259 give 23 units. -289 give 25 units. -319 give 27 units. -349 give 29 units. BG >/= 350 give 31 units. To be given with meal based on pre-meal blood glucose, Disp: 6 mL, Rfl: 0     insulin isophane human (HUMULIN N PEN) 100 UNIT/ML injection, Inject 8 Units Subcutaneous At Bedtime, Disp: 3 mL, Rfl: 1     insulin isophane human (HUMULIN N PEN) 100 UNIT/ML injection, Inject 40 Units Subcutaneous every morning, Disp: 6 mL, Rfl: 1     insulin pen needle (32G X 4 MM) 32G X 4 MM miscellaneous, Use as directed by provider Per insurance coverage, Disp: 100 each, Rfl: 0     insulin pen needle (B-D U/F) 31G X 5 MM miscellaneous, 1 Units by Device route 2 times daily Use 2 pen needles daily or as directed., Disp: 100 each, Rfl: 3     insulin pen needle (NOVOFINE) 30G X 8 MM miscellaneous, Inject 1 Box Subcutaneous 6 times daily Use 6 pen needles daily or as directed., Disp: 200 each, Rfl: 3     magnesium oxide 400 MG CAPS, Take 400 mg by mouth 2 times daily, Disp: 180 capsule, Rfl: 3     mycophenolate (GENERIC EQUIVALENT) 250 MG capsule, Take 2 capsules (500 mg) by mouth 2 times daily, Disp: 120 capsule, Rfl: 0     order for DME, Auto CPAP 8-15 cmH20, Disp: 1 Units, Rfl: 0     oxyCODONE (ROXICODONE) 5 MG tablet, Take 1 tablet (5 mg) by mouth every 4 hours as needed for moderate to severe pain, Disp: 15 tablet, Rfl: 0     pantoprazole (PROTONIX) 40 MG EC tablet, Take 1 tablet (40 mg) by mouth every morning (before breakfast), Disp: 90 tablet, Rfl: 3     polyethylene glycol (MIRALAX)  "17 g packet, 17 g by Oral or Feeding Tube route daily as needed for constipation, Disp: 7 packet, Rfl: 0     rosuvastatin (CRESTOR) 10 MG tablet, Take 1 tablet (10 mg) by mouth daily, Disp: 90 tablet, Rfl: 3     senna-docusate (SENOKOT-S/PERICOLACE) 8.6-50 MG tablet, Take 1 tablet by mouth 2 times daily (hold for loose stools), Disp: 60 tablet, Rfl: 0     sodium hypochlorite (DAKINS) 0.5 % external solution, Apply topically 3 times daily with dressing changes, Disp: 473 mL, Rfl: 0     sulfamethoxazole-trimethoprim (BACTRIM) 400-80 MG tablet, Take 1 tablet by mouth daily, Disp: 90 tablet, Rfl: 3     tamsulosin (FLOMAX) 0.4 MG capsule, Take 1 capsule (0.4 mg) by mouth daily, Disp: 60 capsule, Rfl: 0     tacrolimus (GENERIC EQUIVALENT) 1 MG capsule, Take 4 capsules (4 mg) by mouth every morning AND 4 capsules (4 mg) every evening., Disp: 210 capsule, Rfl: 11    ALLERGIES:    Allergies   Allergen Reactions     Heparin Heparin Induced Thrombocytopenia     HIT screen sent 7/18/2020. CORRINE confirmed positive       LABS:  CBC RESULTS:  Lab Results   Component Value Date    WBC 7.3 10/26/2020    RBC 3.08 (L) 10/20/2020    HGB 9.2 (L) 10/20/2020    HCT 30.5 (L) 10/20/2020    MCV 99 10/20/2020    MCH 29.9 10/20/2020    MCHC 30.2 (L) 10/20/2020    RDW 17.8 (H) 10/20/2020     10/20/2020       BMP RESULTS:  Lab Results   Component Value Date     10/26/2020    POTASSIUM 4.4 10/26/2020    CHLORIDE 103 10/26/2020    CO2 25 10/26/2020    ANIONGAP 7 10/26/2020     (H) 10/26/2020    BUN 23 10/26/2020    CR 1.57 (H) 10/26/2020    GFRESTIMATED 45 (L) 10/26/2020    GFRESTBLACK 52 (L) 10/26/2020    BRYNANA 8.6 10/26/2020        INR RESULTS:  Lab Results   Component Value Date    INR 1.06 10/12/2020         PHYSICAL EXAM:   /70 (BP Location: Right arm, Patient Position: Chair, Cuff Size: Adult Large)   Pulse 98   Ht 1.626 m (5' 4\")   Wt 84.4 kg (186 lb)   SpO2 98%   BMI 31.93 kg/m    General: alert and oriented x 3, " pleasant, no acute distress, normal mood and affect  Incision: left pacer pocket wound filled with pink granulation tissue, no depth, no erythema, no drainage. Sternal incision clean dry and intact without erythema, swelling or drainage. Left old chest tube site also filled with pink granulation tissue and eschar.       ASSESSMENT/PLAN:  Lucian is a 65 year old male  Status post heart transplant with pacer pocket infection s/p debridement who returns to clinic for postop visit.    1. Wounds have healed well. No further packing necessary. Wound care discussed. Clean with microklenz spray and cover with gauze until completely healed.   2. Patient will finish his course of Ciprofloxacin this Friday. No further antibiotics needed.        Approximately 25 minutes spent with the patient in clinic at this visit.    Claudia Chapman PA-C  Cardiothoracic Surgery  Pager [phone]  October 30, 2020    CC  Patient Care Team:  No Ref-Primary, Physician as PCP - Diane Groves APRN CNP as Nurse Practitioner (Cardiology)  Arvin Sheridan MD as MD (Cardiology)  Yue Dowd MD as MD (INTERNAL MEDICINE - ENDOCRINOLOGY, DIABETES & METABOLISM)  Edita Espitia MD as Assigned PCP  Christelle Pettit RN as Transplant Coordinator (Cardiology)  Negrito Rai [facility] as Pharmacist (Pharmacist)  [facility] ([place]  Denita Bueno MD as Assigned Palliative Care Provider  Triny Bunch MD as Assigned Surgical Provider  Arvin Sheridan MD as Assigned Heart and Vascular Provider  SELF, REFERRED

## 2020-11-03 DIAGNOSIS — Z94.1 HEART TRANSPLANT, ORTHOTOPIC, STATUS (H): ICD-10-CM

## 2020-11-03 LAB
ANION GAP SERPL CALCULATED.3IONS-SCNC: 6 MMOL/L (ref 3–14)
BUN SERPL-MCNC: 24 MG/DL (ref 7–30)
CALCIUM SERPL-MCNC: 8.7 MG/DL (ref 8.5–10.1)
CHLORIDE SERPL-SCNC: 106 MMOL/L (ref 94–109)
CO2 SERPL-SCNC: 27 MMOL/L (ref 20–32)
CREAT SERPL-MCNC: 1.71 MG/DL (ref 0.66–1.25)
GFR SERPL CREATININE-BSD FRML MDRD: 41 ML/MIN/{1.73_M2}
GLUCOSE SERPL-MCNC: 88 MG/DL (ref 70–99)
POTASSIUM SERPL-SCNC: 4.6 MMOL/L (ref 3.4–5.3)
SODIUM SERPL-SCNC: 139 MMOL/L (ref 133–144)
TACROLIMUS BLD-MCNC: 7.2 UG/L (ref 5–15)
TME LAST DOSE: NORMAL H

## 2020-11-03 PROCEDURE — 36415 COLL VENOUS BLD VENIPUNCTURE: CPT | Performed by: INTERNAL MEDICINE

## 2020-11-03 PROCEDURE — 80048 BASIC METABOLIC PNL TOTAL CA: CPT | Performed by: INTERNAL MEDICINE

## 2020-11-03 PROCEDURE — 80197 ASSAY OF TACROLIMUS: CPT | Performed by: INTERNAL MEDICINE

## 2020-11-05 ENCOUNTER — TELEPHONE (OUTPATIENT)
Dept: TRANSPLANT | Facility: CLINIC | Age: 65
End: 2020-11-05

## 2020-11-05 ENCOUNTER — APPOINTMENT (OUTPATIENT)
Dept: INTERPRETER SERVICES | Facility: CLINIC | Age: 65
End: 2020-11-05
Payer: COMMERCIAL

## 2020-11-05 DIAGNOSIS — Z94.1 HEART TRANSPLANT, ORTHOTOPIC, STATUS (H): ICD-10-CM

## 2020-11-05 RX ORDER — TACROLIMUS 1 MG/1
CAPSULE ORAL
Qty: 280 CAPSULE | Refills: 11 | Status: SHIPPED | OUTPATIENT
Start: 2020-11-05 | End: 2021-01-07

## 2020-11-05 NOTE — TELEPHONE ENCOUNTER
Tacrolimus level 7.2.  Goal 8-10.  Pt to increase Tacrolimus dose to 5mg in the AM and 4mg in the PM.  Recheck level in one week at clinic appointment.    Could not review BP with pt as his wife was not available to review, and she keeps track.  Will call pt later today to review BPs.  Pt also states that his old pacer wound is nearly healed.    Pt states understanding all instructions and plan of care.  Pt was called using a .

## 2020-11-06 ENCOUNTER — PRE VISIT (OUTPATIENT)
Dept: TRANSPLANT | Facility: CLINIC | Age: 65
End: 2020-11-06

## 2020-11-06 DIAGNOSIS — Z94.1 HEART TRANSPLANT, ORTHOTOPIC, STATUS (H): Primary | ICD-10-CM

## 2020-11-06 RX ORDER — LIDOCAINE 40 MG/G
CREAM TOPICAL
Status: CANCELLED | OUTPATIENT
Start: 2020-11-06

## 2020-11-09 ENCOUNTER — TELEPHONE (OUTPATIENT)
Dept: CARDIOLOGY | Facility: CLINIC | Age: 65
End: 2020-11-09

## 2020-11-09 ENCOUNTER — TELEPHONE (OUTPATIENT)
Dept: TRANSPLANT | Facility: CLINIC | Age: 65
End: 2020-11-09

## 2020-11-09 NOTE — TELEPHONE ENCOUNTER
Pt called and informed that he will be seen by NP on 2A tomorrow instead of the clinic.  Pt will have labs at 8:45 followed by RHC/Biopsy.  Pt is doing well.  BP in the 120-130 over 80-90.  Weight is stable at 180lbs.  Wound vac no longer in place.  Pt doing wet to dry dressings BID- no sign of infection per pt.    Will plan to follow up with pt after tomorrow's 2A visit.  Pt called using a  and states understanding all instructions.

## 2020-11-10 ENCOUNTER — HOSPITAL ENCOUNTER (OUTPATIENT)
Facility: CLINIC | Age: 65
Discharge: HOME | End: 2020-11-10
Attending: INTERNAL MEDICINE | Admitting: INTERNAL MEDICINE
Payer: COMMERCIAL

## 2020-11-10 ENCOUNTER — APPOINTMENT (OUTPATIENT)
Dept: MEDSURG UNIT | Facility: CLINIC | Age: 65
End: 2020-11-10
Attending: INTERNAL MEDICINE
Payer: COMMERCIAL

## 2020-11-10 VITALS
HEIGHT: 64 IN | DIASTOLIC BLOOD PRESSURE: 73 MMHG | WEIGHT: 180 LBS | TEMPERATURE: 98 F | HEART RATE: 103 BPM | SYSTOLIC BLOOD PRESSURE: 150 MMHG | BODY MASS INDEX: 30.73 KG/M2 | OXYGEN SATURATION: 97 % | RESPIRATION RATE: 18 BRPM

## 2020-11-10 DIAGNOSIS — Z94.1 HEART TRANSPLANT, ORTHOTOPIC, STATUS (H): ICD-10-CM

## 2020-11-10 DIAGNOSIS — Z94.1 HEART REPLACED BY TRANSPLANT (H): ICD-10-CM

## 2020-11-10 DIAGNOSIS — Z79.4 TYPE 2 DIABETES MELLITUS WITH HYPERGLYCEMIA, WITH LONG-TERM CURRENT USE OF INSULIN (H): Primary | ICD-10-CM

## 2020-11-10 DIAGNOSIS — E11.65 TYPE 2 DIABETES MELLITUS WITH HYPERGLYCEMIA, WITH LONG-TERM CURRENT USE OF INSULIN (H): Primary | ICD-10-CM

## 2020-11-10 LAB
ANION GAP SERPL CALCULATED.3IONS-SCNC: 6 MMOL/L (ref 3–14)
BUN SERPL-MCNC: 24 MG/DL (ref 7–30)
CALCIUM SERPL-MCNC: 9.1 MG/DL (ref 8.5–10.1)
CHLORIDE SERPL-SCNC: 105 MMOL/L (ref 94–109)
CO2 SERPL-SCNC: 25 MMOL/L (ref 20–32)
CREAT SERPL-MCNC: 1.85 MG/DL (ref 0.66–1.25)
ERYTHROCYTE [DISTWIDTH] IN BLOOD BY AUTOMATED COUNT: 16.1 % (ref 10–15)
GFR SERPL CREATININE-BSD FRML MDRD: 37 ML/MIN/{1.73_M2}
GLUCOSE BLDC GLUCOMTR-MCNC: 136 MG/DL (ref 70–99)
GLUCOSE SERPL-MCNC: 136 MG/DL (ref 70–99)
HCT VFR BLD AUTO: 33.9 % (ref 40–53)
HGB BLD-MCNC: 10.4 G/DL (ref 13.3–17.7)
INR PPP: 1.02 (ref 0.86–1.14)
MAGNESIUM SERPL-MCNC: 1.4 MG/DL (ref 1.6–2.3)
MCH RBC QN AUTO: 28.7 PG (ref 26.5–33)
MCHC RBC AUTO-ENTMCNC: 30.7 G/DL (ref 31.5–36.5)
MCV RBC AUTO: 94 FL (ref 78–100)
PHOSPHATE SERPL-MCNC: 3.8 MG/DL (ref 2.5–4.5)
PLATELET # BLD AUTO: 318 10E9/L (ref 150–450)
POTASSIUM SERPL-SCNC: 4.4 MMOL/L (ref 3.4–5.3)
RBC # BLD AUTO: 3.62 10E12/L (ref 4.4–5.9)
SODIUM SERPL-SCNC: 136 MMOL/L (ref 133–144)
TACROLIMUS BLD-MCNC: 8.6 UG/L (ref 5–15)
TME LAST DOSE: NORMAL H
WBC # BLD AUTO: 5.8 10E9/L (ref 4–11)

## 2020-11-10 PROCEDURE — 80197 ASSAY OF TACROLIMUS: CPT | Performed by: NURSE PRACTITIONER

## 2020-11-10 PROCEDURE — 85610 PROTHROMBIN TIME: CPT | Performed by: NURSE PRACTITIONER

## 2020-11-10 PROCEDURE — 272N000001 HC OR GENERAL SUPPLY STERILE: Performed by: INTERNAL MEDICINE

## 2020-11-10 PROCEDURE — 88350 IMFLUOR EA ADDL 1ANTB STN PX: CPT | Mod: 26 | Performed by: PATHOLOGY

## 2020-11-10 PROCEDURE — 83735 ASSAY OF MAGNESIUM: CPT | Performed by: NURSE PRACTITIONER

## 2020-11-10 PROCEDURE — C1894 INTRO/SHEATH, NON-LASER: HCPCS | Performed by: INTERNAL MEDICINE

## 2020-11-10 PROCEDURE — 88346 IMFLUOR 1ST 1ANTB STAIN PX: CPT | Mod: TC | Performed by: INTERNAL MEDICINE

## 2020-11-10 PROCEDURE — 88350 IMFLUOR EA ADDL 1ANTB STN PX: CPT | Mod: TC | Performed by: INTERNAL MEDICINE

## 2020-11-10 PROCEDURE — 250N000009 HC RX 250: Performed by: INTERNAL MEDICINE

## 2020-11-10 PROCEDURE — 999N001017 HC STATISTIC GLUCOSE BY METER IP

## 2020-11-10 PROCEDURE — 84100 ASSAY OF PHOSPHORUS: CPT | Performed by: NURSE PRACTITIONER

## 2020-11-10 PROCEDURE — 999N000132 HC STATISTIC PP CARE STAGE 1

## 2020-11-10 PROCEDURE — 88346 IMFLUOR 1ST 1ANTB STAIN PX: CPT | Mod: 26 | Performed by: PATHOLOGY

## 2020-11-10 PROCEDURE — 36415 COLL VENOUS BLD VENIPUNCTURE: CPT | Performed by: NURSE PRACTITIONER

## 2020-11-10 PROCEDURE — 93505 ENDOMYOCARDIAL BIOPSY: CPT | Mod: 26 | Performed by: INTERNAL MEDICINE

## 2020-11-10 PROCEDURE — 88307 TISSUE EXAM BY PATHOLOGIST: CPT | Mod: TC | Performed by: INTERNAL MEDICINE

## 2020-11-10 PROCEDURE — 93505 ENDOMYOCARDIAL BIOPSY: CPT | Performed by: INTERNAL MEDICINE

## 2020-11-10 PROCEDURE — 85027 COMPLETE CBC AUTOMATED: CPT | Performed by: NURSE PRACTITIONER

## 2020-11-10 PROCEDURE — 88307 TISSUE EXAM BY PATHOLOGIST: CPT | Mod: 26 | Performed by: PATHOLOGY

## 2020-11-10 PROCEDURE — 99213 OFFICE O/P EST LOW 20 MIN: CPT | Mod: 25 | Performed by: NURSE PRACTITIONER

## 2020-11-10 PROCEDURE — 80048 BASIC METABOLIC PNL TOTAL CA: CPT | Performed by: NURSE PRACTITIONER

## 2020-11-10 RX ORDER — INSULIN LISPRO 100 [IU]/ML
INJECTION, SOLUTION INTRAVENOUS; SUBCUTANEOUS
Qty: 90 ML | Refills: 1 | Status: SHIPPED | OUTPATIENT
Start: 2020-11-10 | End: 2021-05-29

## 2020-11-10 RX ORDER — LIDOCAINE 40 MG/G
CREAM TOPICAL
Status: COMPLETED | OUTPATIENT
Start: 2020-11-10 | End: 2020-11-10

## 2020-11-10 RX ADMIN — LIDOCAINE: 40 CREAM TOPICAL at 09:45

## 2020-11-10 ASSESSMENT — MIFFLIN-ST. JEOR: SCORE: 1502.47

## 2020-11-10 NOTE — IP AVS SNAPSHOT
MRN:[id number]                      After Visit Summary   11/10/2020    Lucian Henderson .    MRN: [id number]           Visit Information        Department      11/10/2020  8:45 AM [facility] [address] [place]          Review of your medicines      UNREVIEWED medicines. Ask your doctor about these medicines       Dose / Directions   acetaminophen 325 MG tablet  Commonly known as: TYLENOL  Used for: Heart transplant, orthotopic, status (H)      Dose: 975 mg  Take 3 tablets (975 mg) by mouth every 8 hours as needed for mild pain  Quantity: 60 tablet  Refills: 3     aspirin 81 MG chewable tablet  Commonly known as: ASA  Used for: Heart transplant, orthotopic, status (H)      Dose: 81 mg  Take 1 tablet (81 mg) by mouth daily  Quantity: 120 tablet  Refills: 0     calcium carbonate 600 mg-vitamin D 400 units 600-400 MG-UNIT per tablet  Commonly known as: CALTRATE  Used for: Heart transplant, orthotopic, status (H)      Dose: 1 tablet  Take 1 tablet by mouth 2 times daily (with meals)  Quantity: 180 tablet  Refills: 3     furosemide 20 MG tablet  Commonly known as: LASIX  Used for: Heart replaced by transplant (H)      Dose: 20 mg  Take 1 tablet (20 mg) by mouth daily  Quantity: 90 tablet  Refills: 3     hydrALAZINE 100 MG tablet  Commonly known as: APRESOLINE  Used for: Heart transplant, orthotopic, status (H)      Dose: 100 mg  Take 1 tablet (100 mg) by mouth every 8 hours  Quantity: 90 tablet  Refills: 0     insulin aspart 100 UNIT/ML pen  Commonly known as: NovoLOG PEN  Used for: Heart transplant, orthotopic, status (H)      Dose: 0-31 Units  Inject 0-31 Units Subcutaneous 3 times daily (with meals) Custom MEAL and SNACK corrective dosing     BG less than 80, do not give Novolog.  BG , give  15 units.  -169, give  17 units.  -199 give 19 units.  -229 give 21 units.  -259 give 23 units.  -289 give 25 units.  -319 give 27 units.  -349  give 29 units.  BG >/= 350 give 31 units.  To be given with meal based on pre-meal blood glucose  Quantity: 6 mL  Refills: 0     * insulin isophane human 100 UNIT/ML injection  Commonly known as: HumuLIN N PEN  Used for: Heart transplant, orthotopic, status (H)      Dose: 8 Units  Inject 8 Units Subcutaneous At Bedtime  Quantity: 3 mL  Refills: 1     * insulin isophane human 100 UNIT/ML injection  Commonly known as: HumuLIN N PEN  Used for: Heart transplant, orthotopic, status (H)      Dose: 40 Units  Inject 40 Units Subcutaneous every morning  Quantity: 6 mL  Refills: 1     magnesium oxide 400 MG Caps  Used for: Heart replaced by transplant (H)      Dose: 400 mg  Take 400 mg by mouth 2 times daily  Quantity: 180 capsule  Refills: 3     mycophenolate 250 MG capsule  Commonly known as: GENERIC EQUIVALENT  Used for: Heart transplant, orthotopic, status (H)      Dose: 500 mg  Take 2 capsules (500 mg) by mouth 2 times daily  Quantity: 120 capsule  Refills: 0     oxyCODONE 5 MG tablet  Commonly known as: ROXICODONE  Used for: Heart transplant, orthotopic, status (H)      Dose: 5 mg  Take 1 tablet (5 mg) by mouth every 4 hours as needed for moderate to severe pain  Quantity: 15 tablet  Refills: 0     pantoprazole 40 MG EC tablet  Commonly known as: PROTONIX  Used for: Heart transplant, orthotopic, status (H)      Dose: 40 mg  Take 1 tablet (40 mg) by mouth every morning (before breakfast)  Quantity: 90 tablet  Refills: 3     polyethylene glycol 17 g packet  Commonly known as: MIRALAX  Used for: Heart transplant, orthotopic, status (H)      Dose: 17 g  17 g by Oral or Feeding Tube route daily as needed for constipation  Quantity: 7 packet  Refills: 0     rosuvastatin 10 MG tablet  Commonly known as: CRESTOR  Used for: Heart transplant, orthotopic, status (H)      Dose: 10 mg  Take 1 tablet (10 mg) by mouth daily  Quantity: 90 tablet  Refills: 3     senna-docusate 8.6-50 MG tablet  Commonly known as:  SENOKOT-S/PERICOLACE  Used for: Heart transplant, orthotopic, status (H)      Dose: 1 tablet  Take 1 tablet by mouth 2 times daily (hold for loose stools)  Quantity: 60 tablet  Refills: 0     sodium hypochlorite 0.5 % external solution  Commonly known as: DAKINS  Used for: Heart transplant, orthotopic, status (H)      Apply topically 3 times daily with dressing changes  Quantity: 473 mL  Refills: 0     sulfamethoxazole-trimethoprim 400-80 MG tablet  Commonly known as: BACTRIM  Indication: Preventative Medication Therapy Used Around Surgery  Used for: Heart transplant, orthotopic, status (H)      Dose: 1 tablet  Take 1 tablet by mouth daily  Quantity: 90 tablet  Refills: 3     tacrolimus 1 MG capsule  Commonly known as: GENERIC EQUIVALENT  Used for: Heart transplant, orthotopic, status (H)      Take 5 capsules (5 mg) by mouth every morning AND 4 capsules (4 mg) every evening.  Quantity: 280 capsule  Refills: 11     tamsulosin 0.4 MG capsule  Commonly known as: FLOMAX  Used for: Heart transplant, orthotopic, status (H)      Dose: 0.4 mg  Take 1 capsule (0.4 mg) by mouth daily  Quantity: 60 capsule  Refills: 0         * This list has 2 medication(s) that are the same as other medications prescribed for you. Read the directions carefully, and ask your doctor or other care provider to review them with you.            CONTINUE these medicines which have NOT CHANGED       Dose / Directions   Alcohol Swabs Pads  Used for: Heart transplant, orthotopic, status (H)      Use to swab the area of the injection or sergio as directed   Per insurance coverage  Quantity: 100 each  Refills: 0     blood glucose monitoring lancets  Used for: Type 2 diabetes mellitus with diabetic nephropathy, with long-term current use of insulin (H)      Use to test blood sugars 2 times daily.  Quantity: 200 each  Refills: 3     blood glucose monitoring meter device kit  Used for: Type 2 diabetes mellitus with diabetic nephropathy, with long-term  current use of insulin (H)      Use to test blood sugar 3-4 times daily, as directed.  Quantity: 1 kit  Refills: 0     blood glucose test strip  Commonly known as: NO BRAND SPECIFIED  Used for: Type 2 diabetes mellitus with diabetic nephropathy, with long-term current use of insulin (H)      Use to test blood sugar 2 times daily or as directed.  Quantity: 200 strip  Refills: 3     * FreeStyle Navjot 14 Day Sensor Misc  Used for: Type 2 diabetes mellitus with hyperglycemia, with long-term current use of insulin (H)      Change every 14 days.  Quantity: 6 each  Refills: 4     * FreeStyle Navjot 14 Day Sensor Misc  Used for: Type 2 diabetes mellitus with hyperglycemia, with long-term current use of insulin (H)      Change every 14 days.  Quantity: 2 each  Refills: 11     * insulin pen needle 32G X 4 MM miscellaneous  Commonly known as: 32G X 4 MM  Used for: Heart transplant, orthotopic, status (H)      Use as directed by provider  Per insurance coverage  Quantity: 100 each  Refills: 0     * NovoFine 30G X 8 MM miscellaneous  Used for: Type 2 diabetes mellitus with hyperglycemia, with long-term current use of insulin (H)  Generic drug: insulin pen needle      Dose: 1 Box  Inject 1 Box Subcutaneous 6 times daily Use 6 pen needles daily or as directed.  Quantity: 200 each  Refills: 3     * B-D U/F 31G X 5 MM miscellaneous  Used for: Type 2 diabetes mellitus with diabetic nephropathy, with long-term current use of insulin (H)  Generic drug: insulin pen needle      Dose: 1 Units  1 Units by Device route 2 times daily Use 2 pen needles daily or as directed.  Quantity: 100 each  Refills: 3     order for DME      Auto CPAP 8-15 cmH20  Quantity: 1 Units  Refills: 0         * This list has 5 medication(s) that are the same as other medications prescribed for you. Read the directions carefully, and ask your doctor or other care provider to review them with you.                  Protect others around you: Learn how to safely use,  store and throw away your medicines at www.disposemymeds.org.       Follow-ups after your visit       Your next 10 appointments already scheduled    Nov 10, 2020  Procedure with John Stuart MD  [facility] ([facility]) [address] [address]  [phone]   The [facility] is located on the corner of [address] and [facility] on the [facility]. It is easily accessible from virtually any point in the [facility] area, via [address] and [address].   Dec 07, 2020  8:30 AM  LAB with UU LAB GOLD WAITING  [facility] ([facility]) [address] [address]   Please do not eat 10-12 hours before your appointment if you are coming in fasting for labs on lipids, cholesterol, or glucose (sugar). Does not apply to pregnant women. Water, tea and black coffee (with nothing added) is okay. Do not drink other fluids, diet soda or gum. If you have concerns about taking your medications, please send a message by clicking on Secure Messaging, Message Your Care Team.     Dec 07, 2020  9:00 AM  Procedure - 2.5 hour with U2A ROOM 1  [facility] [address] [place] ([facility]) [address] [address]      Dec 07, 2020  Procedure with Cardiologist MD Ron  [facility] ([facility]) [address] [address] [phone]   The [facility] is located on the corner of [address] and [address] [facility] on the [facility]. It is easily accessible from virtually any point in the [facility] area, via [address].    Dec 07, 2020  4:00 PM  (Arrive by 3:45 PM)  RETURN HEART TRANSPLANT with Arvin Sheridan MD  [facility] [place] ([facility]) [address] [address] [phone]      Dec 08, 2020 11:30 AM  Telephone Visit with Negrito Rai RPH  [facility] ([facility]) [address]  3rd [address]  [facility] [address] [phone]   [facility]  Note: this is not an onsite visit; there is no need to come to the facility.  Please have a list of all current medications available for appointment.      Jan 04, 2021  8:30 AM  Echo Complete with JANICE  [facility] ([facility], [facility]) [address] [address] [phone]   1. Please bring or wear a comfortable two-piece outfit.  2. You may eat, drink and take your normal medicines.  3. Please do not apply perfumes or lotions on the day of your exam.   4. For any questions that cannot be answered, please contact the ordering physician     Jan 04, 2021  9:30 AM  LAB with UU LAB GOLD WAITING  [facility] ([facility], [facility]) [address] [place] [address]   Please do not eat 10-12 hours before your appointment if you are coming in fasting for labs on lipids, cholesterol, or glucose (sugar). Does not apply to pregnant women. Water, tea and black coffee (with nothing added) is okay. Do not drink other fluids, diet soda or gum. If you have concerns about taking your medications, please send a message by clicking on Secure Messaging, Message Your Care Team.     Jan 04, 2021 10:00 AM  Procedure - 2.5 hour with U2A ROOM 15  [facility] [address] [place] ([facility]  [facility]) [address] [address]      Jan 04, 2021  Procedure with Cardiologist MD Ron  [facility] ([facility]) [address] [address] [phone]   The [facility] is located on the corner of [address] and [address] on the [facility]. It is easily accessible from virtually any point in the [facility] area, via [address] and [address].      Care Instructions       Further instructions from your care team       [facility]                        Interventional Cardiology  Discharge Instructions   Post Right Heart Cath      AFTER YOU GO HOME:    DO drink plenty of fluids    DO resume your regular diet and medications unless otherwise instructed by your Primary Physician    Do Not scrub the procedure site vigorously    No lotion or powder to the puncture site for 3 days    CALL YOUR PRIMARY PHYSICIAN IF: You may resume all normal activity.  Monitor neck site for bleeding, swelling, or voice changes. If you notice bleeding or swelling immediately apply pressure to the site and call number below to speak with Cardiology Fellow.  If you experience any changes in your breathing you should call your doctor immediately or come to the closest Emergency Department.  Do not drive yourself.    ADDITIONAL INSTRUCTIONS: Medications: You are to resume all home medications including anticoagulation therapy unless otherwise advised by your primary cardiologist or nurse coordinator.    Follow Up: Per your primary cardiology team    If you have any questions or concerns regarding your procedure site please call [phone] at anytime and ask for Cardiology Fellow on call.  They are available 24 hours a day.  You may also contact the Cardiology Clinic after hours number at [phone].                                                        Telephone Numbers [phone] Monday-Friday 8:00 am to 4:30 pm    [phone] After 4:30 pm Monday-Friday, Weekends & Holidays  Ask for Interventional Cardiologist on call. Someone is on call 24 hours/day   [facility] toll free number [phone] Monday-Friday 8:00 am to 4:30 pm   [facility] Emergency Dept [phone]                   Additional Information About Your Visit       [employer] Information    [employer] gives you secure access to your electronic health record. If you see a primary care provider, you can also send messages to your care team and make appointments. If you have questions, please call your primary care clinic.  If you do not have a primary care provider, please call [phone] and they will assist you.       Care EveryWhere ID    This is your Care EveryWhere ID. This could be used by other organizations to access your [facility] medical records  [id number]        Primary Care Provider Fax #    Physician No Ref-Primary [phone]      Equal Access to Services    [facility] : Hadii rajani escamilla hadasho Soomaali, waaxda luqadaha, qaybta kaalmada adeegyaaniceto, joana graves . So [facility] [phone].    ATENCIÓN: Si habla español, tiene a lainez disposición servicios gratuitos de asistencia lingüística. Llame al [phone].    We comply with applicable federal and state civil rights laws, including the [place] Human Rights Act. We do not discriminate on the basis of race, color, creed, Protestant, national origin, marital status, age, disability, sex, sexual orientation, or gender identity.       Thank you!    Thank you for choosing [facility] for your care. Our goal is always to provide you with excellent care. Hearing back from our patients is one way we can continue to improve our services. Please take a few minutes to complete the written survey that you may receive in the mail after you visit with us. Thank you!             Medication List      Medications          Morning Afternoon Evening Bedtime As Needed    Alcohol Swabs Pads  INSTRUCTIONS: Use to swab the area of the injection or sergio as directed   Per insurance coverage                     blood glucose monitoring lancets  INSTRUCTIONS: Use to test blood sugars 2 times daily.                     blood glucose monitoring meter device kit  INSTRUCTIONS: Use to test blood sugar 3-4 times daily, as directed.                     blood glucose test strip  Also known as: NO BRAND SPECIFIED  INSTRUCTIONS: Use to test blood sugar 2 times daily or as directed.                     * FreeStyle Navjot 14 Day Sensor Misc  INSTRUCTIONS: Change every 14 days.  Doctor's comments: Use to check BG 5 times daily.                     * FreeStyle Navjot 14 Day Sensor Misc  INSTRUCTIONS: Change every 14 days.                     * insulin pen needle 32G X 4 MM miscellaneous  Also known as: 32G X 4 MM  INSTRUCTIONS: Use as directed by provider  Per insurance coverage                     * NovoFine 30G X 8 MM miscellaneous  INSTRUCTIONS: Inject 1 Box Subcutaneous 6 times daily Use 6 pen needles daily or as directed.  Doctor's comments: Has both Novolog Flex pen and several Humalog Kwik pen, but doesn't have correct needle.  Generic drug: insulin pen needle                     * B-D U/F 31G X 5 MM miscellaneous  INSTRUCTIONS: 1 Units by Device route 2 times daily Use 2 pen needles daily or as directed.  Generic drug: insulin pen needle                     order for DME  INSTRUCTIONS: Auto CPAP 8-15 cmH20                        * This list has 5 medication(s) that are the same as other medications prescribed for you. Read the directions carefully, and ask your doctor or other care provider to review them with you.            ASK your doctor about these medications          Morning Afternoon Evening Bedtime As Needed    acetaminophen 325 MG tablet  Also known as: TYLENOL  INSTRUCTIONS: Take 3 tablets (858  mg) by mouth every 8 hours as needed for mild pain                     aspirin 81 MG chewable tablet  Also known as: ASA  INSTRUCTIONS: Take 1 tablet (81 mg) by mouth daily                     calcium carbonate 600 mg-vitamin D 400 units 600-400 MG-UNIT per tablet  Also known as: CALTRATE  INSTRUCTIONS: Take 1 tablet by mouth 2 times daily (with meals)                     furosemide 20 MG tablet  Also known as: LASIX  INSTRUCTIONS: Take 1 tablet (20 mg) by mouth daily                     hydrALAZINE 100 MG tablet  Also known as: APRESOLINE  INSTRUCTIONS: Take 1 tablet (100 mg) by mouth every 8 hours                     insulin aspart 100 UNIT/ML pen  Also known as: NovoLOG PEN  INSTRUCTIONS: Inject 0-31 Units Subcutaneous 3 times daily (with meals) Custom MEAL and SNACK corrective dosing     BG less than 80, do not give Novolog.  BG , give  15 units.  -169, give  17 units.  -199 give 19 units.  -229 give 21 units.  -259 give 23 units.  -289 give 25 units.  -319 give 27 units.  -349 give 29 units.  BG >/= 350 give 31 units.  To be given with meal based on pre-meal blood glucose                     * insulin isophane human 100 UNIT/ML injection  Also known as: HumuLIN N PEN  INSTRUCTIONS: Inject 8 Units Subcutaneous At Bedtime                     * insulin isophane human 100 UNIT/ML injection  Also known as: HumuLIN N PEN  INSTRUCTIONS: Inject 40 Units Subcutaneous every morning                     magnesium oxide 400 MG Caps  INSTRUCTIONS: Take 400 mg by mouth 2 times daily                     mycophenolate 250 MG capsule  Also known as: GENERIC EQUIVALENT  INSTRUCTIONS: Take 2 capsules (500 mg) by mouth 2 times daily                     oxyCODONE 5 MG tablet  Also known as: ROXICODONE  INSTRUCTIONS: Take 1 tablet (5 mg) by mouth every 4 hours as needed for moderate to severe pain                     pantoprazole 40 MG EC tablet  Also known as: PROTONIX  INSTRUCTIONS:  Take 1 tablet (40 mg) by mouth every morning (before breakfast)                     polyethylene glycol 17 g packet  Also known as: MIRALAX  INSTRUCTIONS: 17 g by Oral or Feeding Tube route daily as needed for constipation                     rosuvastatin 10 MG tablet  Also known as: CRESTOR  INSTRUCTIONS: Take 1 tablet (10 mg) by mouth daily                     senna-docusate 8.6-50 MG tablet  Also known as: SENOKOT-S/PERICOLACE  INSTRUCTIONS: Take 1 tablet by mouth 2 times daily (hold for loose stools)                     sodium hypochlorite 0.5 % external solution  Also known as: DAKINS  INSTRUCTIONS: Apply topically 3 times daily with dressing changes                     sulfamethoxazole-trimethoprim 400-80 MG tablet  Also known as: BACTRIM  INSTRUCTIONS: Take 1 tablet by mouth daily  Reason for med: Preventative Medication Therapy Used Around Surgery                     tacrolimus 1 MG capsule  Also known as: GENERIC EQUIVALENT  INSTRUCTIONS: Take 5 capsules (5 mg) by mouth every morning AND 4 capsules (4 mg) every evening.  Doctor's comments: TXP DT 7/19/2020 (Heart) TXP Dischg DT  DX Heart transplant Z94.1 [facility] ([place], [place])                     tamsulosin 0.4 MG capsule  Also known as: FLOMAX  INSTRUCTIONS: Take 1 capsule (0.4 mg) by mouth daily                        * This list has 2 medication(s) that are the same as other medications prescribed for you. Read the directions carefully, and ask your doctor or other care provider to review them with you.

## 2020-11-10 NOTE — PROGRESS NOTES
0955  Prep is complete for procedure except for consent.  Lucian states that he feels well.  Occasional low back for which he takes Tylenol at home.  He is diabetic & took 35 unit(s) of Humulin today at 0730.  Glucometer at 0952 was 136.  Wife, Shivani, is here with Lucian.  Discharge Instructions have been reviewed with pt.  Verbally demonstrates understanding of instructions.  CTRN

## 2020-11-10 NOTE — DISCHARGE INSTRUCTIONS
[facility]                        Interventional Cardiology  Discharge Instructions   Post Right Heart Cath      AFTER YOU GO HOME:    DO drink plenty of fluids    DO resume your regular diet and medications unless otherwise instructed by your Primary Physician    Do Not scrub the procedure site vigorously    No lotion or powder to the puncture site for 3 days    CALL YOUR PRIMARY PHYSICIAN IF: You may resume all normal activity.  Monitor neck site for bleeding, swelling, or voice changes. If you notice bleeding or swelling immediately apply pressure to the site and call number below to speak with Cardiology Fellow.  If you experience any changes in your breathing you should call your doctor immediately or come to the closest Emergency Department.  Do not drive yourself.    ADDITIONAL INSTRUCTIONS: Medications: You are to resume all home medications including anticoagulation therapy unless otherwise advised by your primary cardiologist or nurse coordinator.    Follow Up: Per your primary cardiology team    If you have any questions or concerns regarding your procedure site please call [phone] at anytime and ask for Cardiology Fellow on call.  They are available 24 hours a day.  You may also contact the Cardiology Clinic after hours number at [phone].                                                       Telephone Numbers [phone] Monday-Friday 8:00 am to 4:30 pm    [phone] After 4:30 pm Monday-Friday, Weekends & Holidays  Ask for Interventional Cardiologist on call. Someone is on call 24 hours/day   [facility] toll free number [phone] Monday-Friday 8:00 am to 4:30 pm   [facility] Emergency Dept [phone]

## 2020-11-10 NOTE — IP AVS SNAPSHOT
[facility] [address] [address]                                    After Visit Summary   11/10/2020    Lucian Henderson     MRN: [id number]           After Visit Summary Signature Page    I have received my discharge instructions, and my questions have been answered. I have discussed any challenges I see with this plan with the nurse or doctor.    ..........................................................................................................................................  Patient/Patient Representative Signature      ..........................................................................................................................................  Patient Representative Print Name and Relationship to Patient    ..................................................               ................................................  Date                                   Time    ..........................................................................................................................................  Reviewed by Signature/Title    ...................................................              ..............................................  Date                                               Time          22EPIC Rev 08/18

## 2020-11-10 NOTE — PROGRESS NOTES
1040  Returned to 2A from [facility] per ambulation.  S/P RHC & Biopsy.  Site at right internal jugular is FDI.  No hematoma noted.  Denies any pain.  Wife remains at bedside.  Discharge instructions have already been reviewed.    1052  Julia Berger, Nurse Practitioner Cardiology, is here to see patient.  Declines any food or liquids.  Wants to go out to eat.

## 2020-11-11 ENCOUNTER — TELEPHONE (OUTPATIENT)
Dept: TRANSPLANT | Facility: CLINIC | Age: 65
End: 2020-11-11

## 2020-11-11 LAB — COPATH REPORT: NORMAL

## 2020-11-11 NOTE — TELEPHONE ENCOUNTER
Pt called with lab/testing results from yesterday's visit.  Tacrolimus level 8.3.  Goal 8-10. No dose changes at this time.  Heart biopsy was negative and right heart cath was normal.  No changes at this time.  Pt states understanding.  Pt called using a .

## 2020-11-12 LAB
ALLOMAP SCORE (EXTERNAL): 36
NEGATIVE PREDICTIVE VALUE PERCENT (EXTERNAL): 98.1 %
POSITIVE PREDICTIVE VALUE PERCENT (EXTERNAL): 7.6 %

## 2020-11-12 NOTE — PROGRESS NOTES
ADULT HEART TRANSPLANT CLINIC    HPI:   Mr. Tee Henderson is a 65 year old male w/ ICM now s/p OHT 7/20/2020 complicated by donor strep salvarius bacteremia, CAD s/p CABG in 2008, DM Type II, HIT, RUE DVT, urinary retention who presents to clinic today for routine heart transplant follow-up. He underwent hospitalization from 9/22 for 10/5 for Left subclavian surgical wound hematoma with infection and abdominal wound infection. He presents for routine biopsy with RHC to 2A today.     History obtained through an interpretor today. He notes he is feeling well today and is very eager to get home to eat. He notes resolution of left subclavian wound. His abdominal wound is scabbed over with no drainage. He denies fever, chills, oral lesions, lightheadedness, dizziness, chest pain, palpitations, SOB, ALBRIGHT, PND, orthopnea, nausea, vomiting, or diarrhea. He complains of persistent LE edema, unchanged with TEDS.     TRANSPLANT MEDICATIONS:  Immunosuppression: Prednisone 5 mg po daily. Tac 3 mg po in AM and 2.5 mg in the evening. Goal 8-10 Mycophenolate 500 mg po BID  Prophylaxis: Bactrim, Toxo D +.   Immunosuppression reduced in setting of infection.     LAST BIOPSY: Pending today   LAST ANGIOGRAM: Delayed due to transient renal function   Serostatus: CMV: D+/R+, EBV: D+/R+, Toxo: D+/R-  Intolerance to medications: None  Rejection history: None. Primary graft dysfunction postop    PAST MEDICAL HISTORY:  Past Medical History:   Diagnosis Date     CAD (coronary artery disease)      CHF (congestive heart failure) (H)      CKD (chronic kidney disease), stage III      Cortical cataract of both eyes      Diabetes (H)      Hyperlipidemia      Hypertension      Ischemic cardiomyopathy      Obesity      CHANTEL (obstructive sleep apnea)     occas cpap     Osteoarthritis        FAMILY HISTORY:  Family History   Problem Relation Age of Onset     Diabetes Brother      Diabetes Sister      Diabetes Sister      Macular Degeneration No family  hx of      Glaucoma No family hx of      Myocardial Infarction No family hx of      Kidney Disease No family hx of        SOCIAL HISTORY:  Social History     Socioeconomic History     Marital status:      Spouse name: None     Number of children: 4     Years of education: None     Highest education level: None   Occupational History     Occupation:      Employer: [employer]     Employer: RETIRED   Social Needs     Financial resource strain: None     Food insecurity     Worry: None     Inability: None     Transportation needs     Medical: None     Non-medical: None   Tobacco Use     Smoking status: Never Smoker     Smokeless tobacco: Never Used     Tobacco comment: Never smoked; non-smoking household   Substance and Sexual Activity     Alcohol use: No     Alcohol/week: 0.0 standard drinks     Drug use: No     Sexual activity: Yes     Partners: Female   Lifestyle     Physical activity     Days per week: None     Minutes per session: None     Stress: None   Relationships     Social connections     Talks on phone: None     Gets together: None     Attends Congregational service: None     Active member of club or organization: None     Attends meetings of clubs or organizations: None     Relationship status: None     Intimate partner violence     Fear of current or ex partner: None     Emotionally abused: None     Physically abused: None     Forced sexual activity: None   Other Topics Concern     Parent/sibling w/ CABG, MI or angioplasty before 65F 55M? No   Social History Narrative     None       CURRENT MEDICATIONS:  No current facility-administered medications for this encounter.      Current Outpatient Medications   Medication     acetaminophen (TYLENOL) 325 MG tablet     Alcohol Swabs PADS     aspirin (ASA) 81 MG chewable tablet     blood glucose (NO BRAND SPECIFIED) test strip     blood glucose monitoring (ACCU-CHEK FELIPE SMARTVIEW) meter device kit     blood glucose monitoring (ONE TOUCH DELICA)  lancets     calcium carbonate 600 mg-vitamin D 400 units (CALTRATE) 600-400 MG-UNIT per tablet     Continuous Blood Gluc Sensor (FREESTYLE JEREMY 14 DAY SENSOR) MISC     Continuous Blood Gluc Sensor (FREESTYLE JEREMY 14 DAY SENSOR) MISC     furosemide (LASIX) 20 MG tablet     hydrALAZINE (APRESOLINE) 100 MG tablet     insulin aspart (NOVOLOG PEN) 100 UNIT/ML pen     insulin isophane human (HUMULIN N PEN) 100 UNIT/ML injection     insulin isophane human (HUMULIN N PEN) 100 UNIT/ML injection     insulin pen needle (32G X 4 MM) 32G X 4 MM miscellaneous     insulin pen needle (B-D U/F) 31G X 5 MM miscellaneous     insulin pen needle (NOVOFINE) 30G X 8 MM miscellaneous     magnesium oxide 400 MG CAPS     mycophenolate (GENERIC EQUIVALENT) 250 MG capsule, 500 mg po BID      order for DME     pantoprazole (PROTONIX) 40 MG EC tablet     polyethylene glycol (MIRALAX) 17 g packet     rosuvastatin (CRESTOR) 10 MG tablet     senna-docusate (SENOKOT-S/PERICOLACE) 8.6-50 MG tablet     sodium hypochlorite (DAKINS) 0.5 % external solution     sulfamethoxazole-trimethoprim (BACTRIM) 400-80 MG tablet     tacrolimus (GENERIC EQUIVALENT) 1 MG capsule, 5 mg in AM and 4 mg in the evening     tamsulosin (FLOMAX) 0.4 MG capsule     HUMALOG KWIKPEN 100 UNIT/ML soln       ROS:   CONSTITUTIONAL: Denies fever, chills, fatigue, or weight fluctuations.   HEENT: Denies headache, vision changes, and changes in speech.   CV: Refer to HPI.   PULMONARY:Denies shortness of breath, cough, or previous TB exposure.   GI:Denies nausea, vomiting, diarrhea, and abdominal pain. Bowel movements are regular.   :Denies urinary alterations, dysuria, urinary frequency, hematuria, and abnormal drainage.   EXT: Complains of lower extremity edema.   SKIN:Denies abnormal rashes or lesions.   MUSCULOSKELETAL:Denies upper or lower extremity weakness and pain.   NEUROLOGIC:Denies lightheadedness, dizziness, seizures, or upper or lower extremity paresthesia.  "    EXAM:  BP (!) 150/73 (BP Location: Right arm, Cuff Size: Adult Regular)   Pulse 103   Temp 98  F (36.7  C) (Oral)   Resp 18   Ht 1.626 m (5' 4\")   Wt 81.6 kg (180 lb)   SpO2 97%   BMI 30.90 kg/m    GENERAL: Appears alert and oriented times three.   HEENT: Eye symmetrical and free of discharge bilaterally. Mucous membranes moist and without lesions.  NECK: Supple and without lymphadenopathy. JVD below clavicular line upright.   CV: RRR, S1S2 present without murmur, rub, or gallop.   RESPIRATORY: Respirations regular, even, and unlabored. Lungs CTA throughout.   GI: Soft and non distended with normoactive bowel sounds present in all quadrants. No tenderness, rebound, guarding. No organomegaly.   EXTREMITIES: +1 bilateral LE peripheral edema. 2+ bilateral pedal pulses.   NEUROLOGIC: Alert and orientated x 3. CN II-XII grossly intact. No focal deficits.   MUSCULOSKELETAL: No joint swelling or tenderness.   SKIN: No jaundice. No rashes or lesions.     Labs:  CBC RESULTS:  Lab Results   Component Value Date    WBC 5.8 11/10/2020    RBC 3.62 (L) 11/10/2020    HGB 10.4 (L) 11/10/2020    HCT 33.9 (L) 11/10/2020    MCV 94 11/10/2020    MCH 28.7 11/10/2020    MCHC 30.7 (L) 11/10/2020    RDW 16.1 (H) 11/10/2020     11/10/2020       CMP RESULTS:  Lab Results   Component Value Date     11/10/2020    POTASSIUM 4.4 11/10/2020    CHLORIDE 105 11/10/2020    CO2 25 11/10/2020    ANIONGAP 6 11/10/2020     (H) 11/10/2020    BUN 24 11/10/2020    CR 1.85 (H) 11/10/2020    GFRESTIMATED 37 (L) 11/10/2020    GFRESTBLACK 43 (L) 11/10/2020    BRYANNA 9.1 11/10/2020    BILITOTAL 0.3 10/20/2020    ALBUMIN 2.6 (L) 10/20/2020    ALKPHOS 131 10/20/2020    ALT 18 10/20/2020    AST 18 10/20/2020        INR RESULTS:  Lab Results   Component Value Date    INR 1.02 11/10/2020       LIPID RESULTS:  Lab Results   Component Value Date    CHOL 118 08/17/2020    HDL 75 08/17/2020    LDL 26 08/17/2020    TRIG 82 08/17/2020    " CHOLHDLRATIO 2.6 06/27/2015       IMMUNOSUPPRESSANT LEVELS  Lab Results   Component Value Date    TACROL 8.6 11/10/2020    DOSTAC Not Provided 11/10/2020       No components found for: CK  Lab Results   Component Value Date    MAG 1.4 (L) 11/10/2020     Lab Results   Component Value Date    A1C 8.2 (H) 07/03/2020     Lab Results   Component Value Date    PHOS 3.8 11/10/2020     Lab Results   Component Value Date    NTBNP 6,187 (H) 11/25/2019     Lab Results   Component Value Date    SAITESTMET [facility] 10/12/2020    SAICELL Class I 10/12/2020    [id number] None 10/12/2020    [id number] None 10/12/2020    SAIREPCOM  10/12/2020      Test performed by modified procedure. Serum heat inactivated and tested   by a modified (Pelham) protocol including fetal calf serum addition.   High-risk, mfi >3,000. Mod-risk, mfi 500-3,000.       Lab Results   Component Value Date    SAIITESTME [facility] 10/12/2020    SAIICELL Class II 10/12/2020    [id number] None 10/12/2020    [id number] None 10/12/2020    SAIIREPCOM  10/12/2020      Test performed by modified procedure. Serum heat inactivated and tested   by a modified (Pelham) protocol including fetal calf serum addition.   High-risk, mfi >3,000. Mod-risk, mfi 500-3,000.       Lab Results   Component Value Date    CSPEC Plasma 10/20/2020       Diagnostic Studies:  Echo 10/12/20:  Interpretation Summary  Patient s/p heart transplant on 07/19/2020. Patient tachycardic during  examination at approximately 100 bpm.     Left ventricular function, chamber size, wall motion, and wall thickness are  normal.The EF is 55-60%.  Global right ventricular function is normal. The right ventricle is normal  size.  Trace tricuspid insufficiency is present.  PA systolic pressure could not be assessed.  No pericardial effusion.  This study was compared with the study from 9/23/20. There has been no change.    RHC 11/10/20:  RA Pressures 10:29 AM   6    8    8      93      RV Pressures 10:29 AM 27        8      85      PA Pressures 10:29 AM 27    14    20        94      PCW Pressures 10:29 AM   10    12    12      94      Cardiac Output      Time TDCO (L/min) TDCI (L/min/m2) Jacy C.O. (L/min) Jacy C.I. (L/min/m2) Jacy HR (bpm)   Cardiac Output Results 10:30 AM 6.17                Assessment and Plan:   Mr. Tee Henderson is a 65 year old male w/ ICM now s/p OHT 7/20/2020 complicated by donor strep salvarius bacteremia, CAD s/p CABG in 2008, DM Type II, HIT, RUE DVT, urinary retention who presents to clinic today for routine heart transplant follow-up. He underwent hospitalization from 9/22 for 10/5 for Left subclavian surgical wound hematoma with infection and abdominal wound infection. He presents for routine biopsy with RHC to 2A today and is euvolemic with persistent LE edema.     Left ICD pocket hematoma complicated by Pseudomonas infection. Blood culture positive for Cutibacterium, contaminant per ID. S/p left surgical wound debridement at bedside per CVTS, culture positive for Pseudomonas. Abdominal wound culture positive for strep mitis and Pseudomonas. Cipro completed per ID for 14 days from 9/28/20.  - Appreciate ID management.      Status post Heart Transplantation on 7/20/20 due to ICM. Primary graft dysfunction, resolved.  Echo 10/12 with EF 55-60%, normal RV function, and no effusion. RHC 9/28 with mRA-6, RV-27/8, mPA-20, mPCWP-10, TDF CO 6.17.  Immunosuppression: Tac 5/4 with level pending (goal 8-10 in setting of infection). Mycophenolate 500 mg po BID   Serostatus: CMV: D+/R+, EBV: D+/R+, Toxo: D+/R-  Prophylaxis: Continue Bactrim given D+ Toxo. Valcyte discontinued in setting of Leukopenia with Anemia, weekly CMV DNA quant negative 10/20.   - Continue ASA and Crestor.   Dermatology evaluation: Annually   Opthalmology evaluation: Annually      Anemia. Leukopenia, resolved. Prior Hematology consult 8/25 notes anemia multifactorial secondary to acute illness, inflammation, drug-related (new  immunosuppressants particularly the MMF, Bactrim) and renal dysfunction. It was recommended by hematology at that time to continue fondaparinux for recent line associated RIJ DVT in the setting of HIT with duration of Fondaparinux for 3 months. Received low dose Neupogen inpatient.  -  Hgb 10.4 with WBC 5.8.     LE edema. Filling pressures stable per [facility] 9/28.  - Encouraged protein intake, TEDS, and activity.   - Continue Lasix 20 mg po daily.     HTN.   - Hydralazine 100 mg po TID.    - BP improved at home.      RIJ and RUE DVT, provoked. US 7/22/20 consistent with nonocclusive thrombus in the right internal jugular vein along the intravenous catheter demonstrated, nonocclusive thrombus along the right PICC line in the right axillary vein demonstrated, and occlusive thrombus in the superficial right cephalic vein demonstrated as above. Follow up US negative 9/24.  - Given HIT history completed 3 months of Fondaparinux.      History of Donor Streptococcus salivarius bacteremia  - Transplant ID consult 7/21, treated with ceftriaxone. Course complete.    CKD Stage III.   - Cr-1.89. BMP in 2 weeks.     Follow up BMP in 2 weeks.     Julia Berger, ELIJAH CNP  11/12/2020          [place]

## 2020-11-13 DIAGNOSIS — Z94.1 HEART TRANSPLANT, ORTHOTOPIC, STATUS (H): ICD-10-CM

## 2020-11-13 DIAGNOSIS — Z79.4 TYPE 2 DIABETES MELLITUS WITH HYPERGLYCEMIA, WITH LONG-TERM CURRENT USE OF INSULIN (H): ICD-10-CM

## 2020-11-13 DIAGNOSIS — E11.65 TYPE 2 DIABETES MELLITUS WITH HYPERGLYCEMIA, WITH LONG-TERM CURRENT USE OF INSULIN (H): ICD-10-CM

## 2020-11-13 RX ORDER — INSULIN LISPRO 100 [IU]/ML
INJECTION, SOLUTION INTRAVENOUS; SUBCUTANEOUS
Qty: 90 ML | Refills: 1 | Status: CANCELLED | OUTPATIENT
Start: 2020-11-13

## 2020-11-13 RX ORDER — HYDRALAZINE HYDROCHLORIDE 100 MG/1
100 TABLET, FILM COATED ORAL EVERY 8 HOURS
Qty: 270 TABLET | Refills: 3 | Status: SHIPPED | OUTPATIENT
Start: 2020-11-13 | End: 2022-01-20

## 2020-11-13 NOTE — TELEPHONE ENCOUNTER
Pt called and requested humalog kiwkpen through [facility], please send rx to us  Thank you!  Cely Ahn CPhT  [facility]

## 2020-11-18 ENCOUNTER — APPOINTMENT (OUTPATIENT)
Dept: INTERPRETER SERVICES | Facility: CLINIC | Age: 65
End: 2020-11-18
Payer: COMMERCIAL

## 2020-11-19 ENCOUNTER — VIRTUAL VISIT (OUTPATIENT)
Dept: ENDOCRINOLOGY | Facility: CLINIC | Age: 65
End: 2020-11-19
Payer: COMMERCIAL

## 2020-11-19 DIAGNOSIS — I25.739 CORONARY ARTERY DISEASE INVOLVING NONAUTOLOGOUS BIOLOGICAL CORONARY BYPASS GRAFT WITH ANGINA PECTORIS (H): ICD-10-CM

## 2020-11-19 DIAGNOSIS — E11.65 TYPE 2 DIABETES MELLITUS WITH HYPERGLYCEMIA, WITH LONG-TERM CURRENT USE OF INSULIN (H): Primary | ICD-10-CM

## 2020-11-19 DIAGNOSIS — N25.81 SECONDARY RENAL HYPERPARATHYROIDISM (H): ICD-10-CM

## 2020-11-19 DIAGNOSIS — Z79.4 TYPE 2 DIABETES MELLITUS WITH HYPERGLYCEMIA, WITH LONG-TERM CURRENT USE OF INSULIN (H): Primary | ICD-10-CM

## 2020-11-19 DIAGNOSIS — Z94.1 HEART TRANSPLANT, ORTHOTOPIC, STATUS (H): ICD-10-CM

## 2020-11-19 PROCEDURE — 99214 OFFICE O/P EST MOD 30 MIN: CPT | Mod: 95 | Performed by: PHYSICIAN ASSISTANT

## 2020-11-19 NOTE — PATIENT INSTRUCTIONS
"    Estimado Lucian y Shivani,    Siempre un gusto saludar con Uds.      Me alegra mucho caesar que esta con mas ganas, energia y mejorando bastante.  Lainez azucar esta savannah controlada.    Cuando vuelven al laboratorio, por favor aseguran que sacan el \"A1C\" para chequer el diabetes.    Por favor, sigue tomando lainez insulina  helen halie esta kiley dejame saber si la glucosa baja menos que <70 o sube >200.      Sigue lainez actividad.  Pueden caesar en el internet o  You tube \"[employer] [facility] Active older adults,\"  \"Phani para tercer edad\" o \"Rumba adulto mayor,\"  Para mas actividad en casa.      Deseandoles terri y lane,  Attentamnete,      Marisabel Prieto PA-C, MPAS  [facility]  Diabetes, Endocrinology, and Metabolism  [phone] Appointments/Nurse  [phone] Fax  [phone] URGENTafter hours/weekend Endocrinologist on call            We appreciate your assistance in coordinating your healthcare.     Please upload your insulin pump, blood sugar meter and/or continuous glucose monitor at home 1-2 days before your next diabetes-related appointment.   This will allow your provider to review your  data before your scheduled virtual visit.    To ask a question to your Endocrine care team, please send them a Heroic message, or reach them by phone at [phone]     To expedite your medication refill(s), please contact your pharmacy and have them   fax a refill request to: [phone].  *Please allow 3 business days for routine medication refills.  *Please allow 5 business days for controlled substance medication refills.    For after-hours urgent Endocrine issues, that do not require 661, please dial [phone], and ask to speak with the Endocrinologist On-Call          "

## 2020-11-19 NOTE — PROGRESS NOTES
"Outcome for 11/18/20 1:03 PM :Glucose data obtained via Phone and Located in Table Below     Lucian Henderson Sr. is a 64 year old male who is being evaluated via a billable video visit.      The patient has been notified of following:     \"This video visit will be conducted via a call between you and your physician/provider. We have found that certain health care needs can be provided without the need for an in-person physical exam.  This service lets us provide the care you need with a video conversation.  If a prescription is necessary we can send it directly to your pharmacy.  If lab work is needed we can place an order for that and you can then stop by our lab to have the test done at a later time.    Video visits are billed at different rates depending on your insurance coverage.  Please reach out to your insurance provider with any questions.    If during the course of the call the physician/provider feels a video visit is not appropriate, you will not be charged for this service.\"    Patient has given verbal consent for Video visit? Yes  How would you like to obtain your AVS? MyChart  If you are dropped from the video visit, the video invite should be resent to: Text to cell phone: [phone]  Will anyone else be joining your video visit? No        Video-Visit Details    Type of service:  Video Visit    Video Start Time: 3:07 PM  Video End Time: 3:29 PM    Originating Location (pt. Location): Home    Distant Location (provider location):  [facility] ENDOCRINOLOGY [facility]     Platform used for Video Visit: Other: Whats Gaby per patient request        Diabetes Virtual Consult Note  Marisabel Prieto PA-C  October 13, 2020      Lucian Henderson Sr. is a 64 year old male with a past medical history including  has a past medical history of CAD (coronary artery disease), CHF (congestive heart failure) (H), CKD (chronic kidney disease), stage III, Cortical cataract of both eyes, " Diabetes (H), Hyperlipidemia, Hypertension, Ischemic cardiomyopathy, Obesity, CHANTEL (obstructive sleep apnea), and Osteoarthritis. He also has no past medical history of Chronic infection, Complication of anesthesia, COPD (chronic obstructive pulmonary disease) (H), Heart murmur, Irregular heart beat, Liver disease, Other chronic pain, or Uncomplicated asthma.    Lucian was again admitted to [facility] for hyperglycemia 9/22 - 10/5/2020 and patient was discharged on NPH and a Novolog 15 - 31 unit sliding scale that did not require addition skills with meals and was doing well when I last visited with him on 10/13/20.      Today Lucian and Shivani tell me they are giving 35 unit each morning and 8 each evening.  They are also giving 15 - 19 units Novolog before each meal with sliding scale and are glad to see BG are well despite the fact that he is now eating more.  He feels good and appetite is too good.  They are walking every day in the park in front of their home 30 - 60 minutes, more often 60, but worried about the cold and what they will do.  He denies chest pain, lightheaded with this.  SOB only improving.  Their son is doing their errands for them mostly.  They rarely go out.       DM regimen:  - NPH 8 units qpm   - NPH 35 units qam   - Prednisone 5 mg qam      We reviewed glucometer data together.  It revealed:      Outcome for 11/19/20 9:50 AM :Glucose data obtained via Phone and Located in Table Below      Week of__/__/__ Date  __/_08  Date  __/_09_ Date  __/_10_ Date  __/_11_ Date  __/_12_ Date  __/13__ Date  __/_14_    Time BG Time BG Time BG Time BG Time BG Time BG Time BG     180  121  123  106  131  135  160     170  136  130  110  140  142  155                      150  140  135  128  145  152  160     Week of__/__/__ Date  __/_15_  Date  __/_16_ Date  __/_17_ Date  __/_18_ Date  __/__ Date  __/__ Date  __/__    Time BG Time BG Time BG Time BG Time BG Time BG Time BG     131  156  153  173           128  140   142                              130  150  118                 History of Diabetes monitoring and complications/ prevention:  CAD: Yes  Last eye exam results: 09/18/2018  Last dental exam: not discussed today.  Microalbuminuria: 6 October 2018  HTN: Yes  On Statin: Yes  On Aspirin: Yes  Depression: No - worried, but happy. Hopeful.    ED: yes, limited concerned at this time     Janice  has a past medical history of CAD (coronary artery disease), CHF (congestive heart failure) (H), CKD (chronic kidney disease), stage III, Cortical cataract of both eyes, Diabetes (H), Hyperlipidemia, Hypertension, Ischemic cardiomyopathy, Obesity, CHANTEL (obstructive sleep apnea), and Osteoarthritis. He also has no past medical history of Chronic infection, Complication of anesthesia, COPD (chronic obstructive pulmonary disease) (H), Heart murmur, Irregular heart beat, Liver disease, Other chronic pain, or Uncomplicated asthma.  Current Outpatient Medications   Medication     acetaminophen (TYLENOL) 325 MG tablet     Alcohol Swabs PADS     aspirin (ASA) 81 MG chewable tablet     blood glucose (NO BRAND SPECIFIED) test strip     blood glucose monitoring (ACCU-CHEK FELIPE SMARTVIEW) meter device kit     blood glucose monitoring (ONE TOUCH DELICA) lancets     calcium carbonate 600 mg-vitamin D 400 units (CALTRATE) 600-400 MG-UNIT per tablet     Continuous Blood Gluc Sensor (FREESTYLE JEREMY 14 DAY SENSOR) MISC     Continuous Blood Gluc Sensor (FREESTYLE JEREMY 14 DAY SENSOR) MISC     furosemide (LASIX) 20 MG tablet     HUMALOG KWIKPEN 100 UNIT/ML soln     hydrALAZINE (APRESOLINE) 100 MG tablet     insulin aspart (NOVOLOG PEN) 100 UNIT/ML pen     insulin isophane human (HUMULIN N PEN) 100 UNIT/ML injection     insulin isophane human (HUMULIN N PEN) 100 UNIT/ML injection     insulin pen needle (32G X 4 MM) 32G X 4 MM miscellaneous     insulin pen needle (B-D U/F) 31G X 5 MM miscellaneous     insulin pen needle (NOVOFINE) 30G X 8 MM  "miscellaneous     magnesium oxide 400 MG CAPS     mycophenolate (GENERIC EQUIVALENT) 250 MG capsule     order for DME     pantoprazole (PROTONIX) 40 MG EC tablet     polyethylene glycol (MIRALAX) 17 g packet     rosuvastatin (CRESTOR) 10 MG tablet     senna-docusate (SENOKOT-S/PERICOLACE) 8.6-50 MG tablet     sodium hypochlorite (DAKINS) 0.5 % external solution     sulfamethoxazole-trimethoprim (BACTRIM) 400-80 MG tablet     tacrolimus (GENERIC EQUIVALENT) 1 MG capsule     tamsulosin (FLOMAX) 0.4 MG capsule     No current facility-administered medications for this visit.               Lucian's family history includes Diabetes in his brother, sister, and sister.    ROS:   Patient denies any fevers, chills or sweats as well as any changes in or problems with vision, pain or problems with dentition, new or different headaches.  Patient denies and symptoms of high or low BG.  Patients denies marked fatigue, cough, shortness of breath, chest pain or pressure.  There has been no pain with or other changes in urination or  itching or pain in genital areas.  Patient denies any noted swelling in feet, ankles or otherwise, loss of sensation or pain  in feet or other areas.    Patient also denies current difficulties with depressed mood, anhedonia or worrying too much.        Exam:    PHONE VISIT    Lucian is alerted and oriented.  He and Shivani are jovial and joking.  They are able to tell me how much insulin to give for a given BG (120, 180), largely from memory it seems.    Mood is \"good,\" affect is congruent.  Thoughtful form and content are fluid and coherent.  No signs of distress are appreciated.  No cough or SOB.  Good skin color.      Data:      Most recent:  Lab Results   Component Value Date    CR 1.77 08/31/2020     Lab Results   Component Value Date     08/31/2020       GFR Estimate   Date Value Ref Range Status   11/10/2020 37 (L) >60 mL/min/[1.73_m2] Final     Comment:     Non  GFR " Calc  Starting 12/18/2018, serum creatinine based estimated GFR (eGFR) will be   calculated using the Chronic Kidney Disease Epidemiology Collaboration   (CKD-EPI) equation.     11/03/2020 41 (L) >60 mL/min/[1.73_m2] Final     Comment:     Non  GFR Calc  Starting 12/18/2018, serum creatinine based estimated GFR (eGFR) will be   calculated using the Chronic Kidney Disease Epidemiology Collaboration   (CKD-EPI) equation.     10/26/2020 45 (L) >60 mL/min/[1.73_m2] Final     Comment:     Non  GFR Calc  Starting 12/18/2018, serum creatinine based estimated GFR (eGFR) will be   calculated using the Chronic Kidney Disease Epidemiology Collaboration   (CKD-EPI) equation.         Lab Results   Component Value Date    A1C 8.2 (H) 07/03/2020    A1C 7.9 (H) 07/08/2019    A1C 8.5 (H) 03/11/2019    A1C 7.6 (H) 10/16/2018    A1C 9.8 (H) 09/06/2017    HEMOGLOBINA1 8.3 (A) 08/06/2018    HEMOGLOBINA1 10.6 (A) 04/30/2018     Lab Results   Component Value Date    MICROL 6 10/03/2018     No results found for: MICROALBUMIN  No results found for: CPEPT, GADAB, ISCAB  Cholesterol   Date Value Ref Range Status   08/17/2020 118 <200 mg/dL Final   07/08/2019 104 <200 mg/dL Final     HDL Cholesterol   Date Value Ref Range Status   08/17/2020 75 >39 mg/dL Final   07/08/2019 27 (L) >39 mg/dL Final     LDL Cholesterol Calculated   Date Value Ref Range Status   08/17/2020 26 <100 mg/dL Final     Comment:     Desirable:       <100 mg/dl   07/08/2019 41 <100 mg/dL Final     Comment:     Desirable:       <100 mg/dl     Triglycerides   Date Value Ref Range Status   08/17/2020 82 <150 mg/dL Final   07/08/2019 181 (H) <150 mg/dL Final     Comment:     Borderline high:  150-199 mg/dl  High:             200-499 mg/dl  Very high:       >499 mg/dl       Cholesterol/HDL Ratio   Date Value Ref Range Status   06/27/2015 2.6 0.0 - 5.0 Final   01/11/2015 6.0 (H) 0.0 - 5.0 Final           Assessment/Plan:    Lucian is a 65 year old  male with  has a past medical history of CAD (coronary artery disease), CHF (congestive heart failure) (H), CKD (chronic kidney disease), stage III, Cortical cataract of both eyes, Diabetes (H), Hyperlipidemia, Hypertension, Ischemic cardiomyopathy, Obesity, CHANTEL (obstructive sleep apnea), and Osteoarthritis. He also has no past medical history of Chronic infection, Complication of anesthesia, COPD (chronic obstructive pulmonary disease) (H), Heart murmur, Irregular heart beat, Liver disease, Other chronic pain, or Uncomplicated asthma.    Type 2 DM exacerbated by steroid use:  Now well-controlled on bid NPH and sliding scale Novolog with meals.  Continue same for now.  Consider GLP-1 as stabilizes.  This should help with appetite.  Watch GFR, though appears unlikely will resume SGLT2-inhibitor.      He is on statin.  Advise update eye exam at RTC.  Discussed options for maintaining physical activity as weather limits outdoors.     >50% of 25 minute visit spent in counseling, education and coordination of care related to healthy lifestyle, prevention of diabetic complications, options for better glycemic control as well as preventing, detecting, and treating hypoglycemia.      It is my privilege to be involved in the care of the above patient.     Marisabel Prieto PA-C, MPAS  [facility]  Diabetes, Endocrinology, and Metabolism  [phone] Appointments/Nurse  [phone] pager  [phone] nurse line    This note was completed in part using Dragon voice recognition, and may contain word and grammatical errors.

## 2020-11-19 NOTE — LETTER
"11/19/2020       RE: Lucian Henderson Sr.  [address] [place] [facility] [address]     Dear Colleague,    Thank you for referring your patient, Lucian Henderson Sr., to the [facility] ENDOCRINOLOG[facility] CLINIC [place] [facility]. Please see a copy of my visit note below.    Outcome for 11/18/20 1:03 PM :Glucose data obtained via Phone and Located in Table Below     Lucian Henderson Sr. is a 64 year old male who is being evaluated via a billable video visit.      The patient has been notified of following:     \"This video visit will be conducted via a call between you and your physician/provider. We have found that certain health care needs can be provided without the need for an in-person physical exam.  This service lets us provide the care you need with a video conversation.  If a prescription is necessary we can send it directly to your pharmacy.  If lab work is needed we can place an order for that and you can then stop by our lab to have the test done at a later time.    Video visits are billed at different rates depending on your insurance coverage.  Please reach out to your insurance provider with any questions.    If during the course of the call the physician/provider feels a video visit is not appropriate, you will not be charged for this service.\"    Patient has given verbal consent for Video visit? Yes  How would you like to obtain your AVS? MyChart  If you are dropped from the video visit, the video invite should be resent to: Text to cell phone: [phone]  Will anyone else be joining your video visit? No        Video-Visit Details    Type of service:  Video Visit    Video Start Time: 3:07 PM  Video End Time: 3:29 PM    Originating Location (pt. Location): Home    Distant Location (provider location):  [facility] ENDOCRINOLOGY [facility]     Platform used for Video Visit: Other: Whats Gaby per patient " request        Diabetes Virtual Consult Note  Marisabel Prieto PA-C  October 13, 2020      Lucian Henderson Sr. is a 64 year old male with a past medical history including  has a past medical history of CAD (coronary artery disease), CHF (congestive heart failure) (H), CKD (chronic kidney disease), stage III, Cortical cataract of both eyes, Diabetes (H), Hyperlipidemia, Hypertension, Ischemic cardiomyopathy, Obesity, CHANTEL (obstructive sleep apnea), and Osteoarthritis. He also has no past medical history of Chronic infection, Complication of anesthesia, COPD (chronic obstructive pulmonary disease) (H), Heart murmur, Irregular heart beat, Liver disease, Other chronic pain, or Uncomplicated asthma.    Lucian was again admitted to [facility] for hyperglycemia 9/22 - 10/5/2020 and patient was discharged on NPH and a Novolog 15 - 31 unit sliding scale that did not require addition skills with meals and was doing well when I last visited with him on 10/13/20.      Today Lucian and Shivani tell me they are giving 35 unit each morning and 8 each evening.  They are also giving 15 - 19 units Novolog before each meal with sliding scale and are glad to see BG are well despite the fact that he is now eating more.  He feels good and appetite is too good.  They are walking every day in the park in front of their home 30 - 60 minutes, more often 60, but worried about the cold and what they will do.  He denies chest pain, lightheaded with this.  SOB only improving.  Their son is doing their errands for them mostly.  They rarely go out.       DM regimen:  - NPH 8 units qpm   - NPH 35 units qam   - Prednisone 5 mg qam      We reviewed glucometer data together.  It revealed:      Outcome for 11/19/20 9:50 AM :Glucose data obtained via Phone and Located in Table Below      Week of__/__/__ Date  __/_08  Date  __/_09_ Date  __/_10_ Date  __/_11_ Date  __/_12_ Date  __/13__ Date  __/_14_    Time BG Time BG Time BG Time BG Time BG Time BG Time BG      180  121  123  106  131  135  160     170  136  130  110  140  142  155                      150  140  135  128  145  152  160     Week of__/__/__ Date  __/_15_  Date  __/_16_ Date  __/_17_ Date  __/_18_ Date  __/__ Date  __/__ Date  __/__    Time BG Time BG Time BG Time BG Time BG Time BG Time BG     131  156  153  173           128  140  142                              130  150  118                 History of Diabetes monitoring and complications/ prevention:  CAD: Yes  Last eye exam results: 09/18/2018  Last dental exam: not discussed today.  Microalbuminuria: 6 October 2018  HTN: Yes  On Statin: Yes  On Aspirin: Yes  Depression: No - worried, but happy. Hopeful.    ED: yes, limited concerned at this time     Janice  has a past medical history of CAD (coronary artery disease), CHF (congestive heart failure) (H), CKD (chronic kidney disease), stage III, Cortical cataract of both eyes, Diabetes (H), Hyperlipidemia, Hypertension, Ischemic cardiomyopathy, Obesity, CHANTEL (obstructive sleep apnea), and Osteoarthritis. He also has no past medical history of Chronic infection, Complication of anesthesia, COPD (chronic obstructive pulmonary disease) (H), Heart murmur, Irregular heart beat, Liver disease, Other chronic pain, or Uncomplicated asthma.  Current Outpatient Medications   Medication     acetaminophen (TYLENOL) 325 MG tablet     Alcohol Swabs PADS     aspirin (ASA) 81 MG chewable tablet     blood glucose (NO BRAND SPECIFIED) test strip     blood glucose monitoring (ACCU-CHEK FELIPE SMARTVIEW) meter device kit     blood glucose monitoring (ONE TOUCH DELICA) lancets     calcium carbonate 600 mg-vitamin D 400 units (CALTRATE) 600-400 MG-UNIT per tablet     Continuous Blood Gluc Sensor (FREESTYLE JEREMY 14 DAY SENSOR) MISC     Continuous Blood Gluc Sensor (FREESTYLE JEREMY 14 DAY SENSOR) MISC     furosemide (LASIX) 20 MG tablet     HUMALOG KWIKPEN 100 UNIT/ML soln     hydrALAZINE (APRESOLINE) 100 MG tablet      "insulin aspart (NOVOLOG PEN) 100 UNIT/ML pen     insulin isophane human (HUMULIN N PEN) 100 UNIT/ML injection     insulin isophane human (HUMULIN N PEN) 100 UNIT/ML injection     insulin pen needle (32G X 4 MM) 32G X 4 MM miscellaneous     insulin pen needle (B-D U/F) 31G X 5 MM miscellaneous     insulin pen needle (NOVOFINE) 30G X 8 MM miscellaneous     magnesium oxide 400 MG CAPS     mycophenolate (GENERIC EQUIVALENT) 250 MG capsule     order for DME     pantoprazole (PROTONIX) 40 MG EC tablet     polyethylene glycol (MIRALAX) 17 g packet     rosuvastatin (CRESTOR) 10 MG tablet     senna-docusate (SENOKOT-S/PERICOLACE) 8.6-50 MG tablet     sodium hypochlorite (DAKINS) 0.5 % external solution     sulfamethoxazole-trimethoprim (BACTRIM) 400-80 MG tablet     tacrolimus (GENERIC EQUIVALENT) 1 MG capsule     tamsulosin (FLOMAX) 0.4 MG capsule     No current facility-administered medications for this visit.               Lucian's family history includes Diabetes in his brother, sister, and sister.    ROS:   Patient denies any fevers, chills or sweats as well as any changes in or problems with vision, pain or problems with dentition, new or different headaches.  Patient denies and symptoms of high or low BG.  Patients denies marked fatigue, cough, shortness of breath, chest pain or pressure.  There has been no pain with or other changes in urination or  itching or pain in genital areas.  Patient denies any noted swelling in feet, ankles or otherwise, loss of sensation or pain  in feet or other areas.    Patient also denies current difficulties with depressed mood, anhedonia or worrying too much.        Exam:    PHONE VISIT    Lucian is alerted and oriented.  He and Shivani are jovial and joking.  They are able to tell me how much insulin to give for a given BG (120, 180), largely from memory it seems.    Mood is \"good,\" affect is congruent.  Thoughtful form and content are fluid and coherent.  No signs of distress are " appreciated.  No cough or SOB.  Good skin color.      Data:      Most recent:  Lab Results   Component Value Date    CR 1.77 08/31/2020     Lab Results   Component Value Date     08/31/2020       GFR Estimate   Date Value Ref Range Status   11/10/2020 37 (L) >60 mL/min/[1.73_m2] Final     Comment:     Non  GFR Calc  Starting 12/18/2018, serum creatinine based estimated GFR (eGFR) will be   calculated using the Chronic Kidney Disease Epidemiology Collaboration   (CKD-EPI) equation.     11/03/2020 41 (L) >60 mL/min/[1.73_m2] Final     Comment:     Non  GFR Calc  Starting 12/18/2018, serum creatinine based estimated GFR (eGFR) will be   calculated using the Chronic Kidney Disease Epidemiology Collaboration   (CKD-EPI) equation.     10/26/2020 45 (L) >60 mL/min/[1.73_m2] Final     Comment:     Non  GFR Calc  Starting 12/18/2018, serum creatinine based estimated GFR (eGFR) will be   calculated using the Chronic Kidney Disease Epidemiology Collaboration   (CKD-EPI) equation.         Lab Results   Component Value Date    A1C 8.2 (H) 07/03/2020    A1C 7.9 (H) 07/08/2019    A1C 8.5 (H) 03/11/2019    A1C 7.6 (H) 10/16/2018    A1C 9.8 (H) 09/06/2017    HEMOGLOBINA1 8.3 (A) 08/06/2018    HEMOGLOBINA1 10.6 (A) 04/30/2018     Lab Results   Component Value Date    MICROL 6 10/03/2018     No results found for: MICROALBUMIN  No results found for: CPEPT, GADAB, ISCAB  Cholesterol   Date Value Ref Range Status   08/17/2020 118 <200 mg/dL Final   07/08/2019 104 <200 mg/dL Final     HDL Cholesterol   Date Value Ref Range Status   08/17/2020 75 >39 mg/dL Final   07/08/2019 27 (L) >39 mg/dL Final     LDL Cholesterol Calculated   Date Value Ref Range Status   08/17/2020 26 <100 mg/dL Final     Comment:     Desirable:       <100 mg/dl   07/08/2019 41 <100 mg/dL Final     Comment:     Desirable:       <100 mg/dl     Triglycerides   Date Value Ref Range Status   08/17/2020 82 <150 mg/dL  Final   07/08/2019 181 (H) <150 mg/dL Final     Comment:     Borderline high:  150-199 mg/dl  High:             200-499 mg/dl  Very high:       >499 mg/dl       Cholesterol/HDL Ratio   Date Value Ref Range Status   06/27/2015 2.6 0.0 - 5.0 Final   01/11/2015 6.0 (H) 0.0 - 5.0 Final           Assessment/Plan:    Lucian is a 65 year old male with  has a past medical history of CAD (coronary artery disease), CHF (congestive heart failure) (H), CKD (chronic kidney disease), stage III, Cortical cataract of both eyes, Diabetes (H), Hyperlipidemia, Hypertension, Ischemic cardiomyopathy, Obesity, CHANTEL (obstructive sleep apnea), and Osteoarthritis. He also has no past medical history of Chronic infection, Complication of anesthesia, COPD (chronic obstructive pulmonary disease) (H), Heart murmur, Irregular heart beat, Liver disease, Other chronic pain, or Uncomplicated asthma.    Type 2 DM exacerbated by steroid use:  Now well-controlled on bid NPH and sliding scale Novolog with meals.  Continue same for now.  Consider GLP-1 as stabilizes.  This should help with appetite.  Watch GFR, though appears unlikely will resume SGLT2-inhibitor.      He is on statin.  Advise update eye exam at RTC.  Discussed options for maintaining physical activity as weather limits outdoors.     >50% of 25 minute visit spent in counseling, education and coordination of care related to healthy lifestyle, prevention of diabetic complications, options for better glycemic control as well as preventing, detecting, and treating hypoglycemia.      It is my privilege to be involved in the care of the above patient.     Marisabel Prieto PA-C, MPAS  [facility]  Diabetes, Endocrinology, and Metabolism  [phone] Appointments/Nurse  [phone] pager  [phone] nurse line    This note was completed in part using Dragon voice recognition, and may contain word and grammatical errors.

## 2020-11-20 ENCOUNTER — TELEPHONE (OUTPATIENT)
Dept: TRANSPLANT | Facility: CLINIC | Age: 65
End: 2020-11-20

## 2020-11-20 ENCOUNTER — APPOINTMENT (OUTPATIENT)
Dept: INTERPRETER SERVICES | Facility: CLINIC | Age: 65
End: 2020-11-20
Payer: COMMERCIAL

## 2020-11-20 DIAGNOSIS — Z94.1 HEART TRANSPLANT, ORTHOTOPIC, STATUS (H): Primary | ICD-10-CM

## 2020-11-20 NOTE — TELEPHONE ENCOUNTER
Left VM with pt's daughter, Elisabeth, letting her know we would like pt to have his labs drawn for a BMP check on 11/25/20.  Appointment was made at [facility] for pt.  Pt also called using .  Pt states his chest wound is now completely healed- he is no longer needing a dressing.  Pt states understanding instructions and plan of care.

## 2020-11-25 DIAGNOSIS — Z94.1 HEART TRANSPLANT, ORTHOTOPIC, STATUS (H): ICD-10-CM

## 2020-11-25 LAB
ANION GAP SERPL CALCULATED.3IONS-SCNC: 8 MMOL/L (ref 3–14)
BUN SERPL-MCNC: 30 MG/DL (ref 7–30)
CALCIUM SERPL-MCNC: 8.9 MG/DL (ref 8.5–10.1)
CHLORIDE SERPL-SCNC: 105 MMOL/L (ref 94–109)
CO2 SERPL-SCNC: 25 MMOL/L (ref 20–32)
CREAT SERPL-MCNC: 1.65 MG/DL (ref 0.66–1.25)
GFR SERPL CREATININE-BSD FRML MDRD: 42 ML/MIN/{1.73_M2}
GLUCOSE SERPL-MCNC: 99 MG/DL (ref 70–99)
POTASSIUM SERPL-SCNC: 4.7 MMOL/L (ref 3.4–5.3)
SODIUM SERPL-SCNC: 138 MMOL/L (ref 133–144)

## 2020-11-25 PROCEDURE — 80197 ASSAY OF TACROLIMUS: CPT | Performed by: INTERNAL MEDICINE

## 2020-11-25 PROCEDURE — 36415 COLL VENOUS BLD VENIPUNCTURE: CPT | Performed by: INTERNAL MEDICINE

## 2020-11-25 PROCEDURE — 80048 BASIC METABOLIC PNL TOTAL CA: CPT | Performed by: INTERNAL MEDICINE

## 2020-11-26 LAB
TACROLIMUS BLD-MCNC: 8.2 UG/L (ref 5–15)
TME LAST DOSE: 2100 H

## 2020-11-27 ENCOUNTER — APPOINTMENT (OUTPATIENT)
Dept: INTERPRETER SERVICES | Facility: CLINIC | Age: 65
End: 2020-11-27
Payer: COMMERCIAL

## 2020-11-27 ENCOUNTER — TELEPHONE (OUTPATIENT)
Dept: TRANSPLANT | Facility: CLINIC | Age: 65
End: 2020-11-27

## 2020-11-27 DIAGNOSIS — Z94.1 HEART TRANSPLANT, ORTHOTOPIC, STATUS (H): ICD-10-CM

## 2020-11-27 RX ORDER — MYCOPHENOLATE MOFETIL 250 MG/1
1000 CAPSULE ORAL 2 TIMES DAILY
Qty: 240 CAPSULE | Refills: 11 | Status: SHIPPED | OUTPATIENT
Start: 2020-11-27 | End: 2021-08-23 | Stop reason: ALTCHOICE

## 2020-11-27 NOTE — TELEPHONE ENCOUNTER
Pt and wife called through interpretor to discuss labs.   Tac level within goal 8-10; no dose change. Creat improved.     Per Dr Sheridan unless Immuknow low- increase MMF to 1000 mg BID- Immuknow 416. MMF increased.    Med change verified.

## 2020-12-02 ENCOUNTER — PRE VISIT (OUTPATIENT)
Dept: TRANSPLANT | Facility: CLINIC | Age: 65
End: 2020-12-02

## 2020-12-02 DIAGNOSIS — Z94.1 HEART TRANSPLANT, ORTHOTOPIC, STATUS (H): Primary | ICD-10-CM

## 2020-12-02 RX ORDER — LIDOCAINE 40 MG/G
CREAM TOPICAL
Status: CANCELLED | OUTPATIENT
Start: 2020-12-02

## 2020-12-04 ENCOUNTER — TELEPHONE (OUTPATIENT)
Dept: CARDIOLOGY | Facility: CLINIC | Age: 65
End: 2020-12-04

## 2020-12-06 DIAGNOSIS — Z11.59 ENCOUNTER FOR SCREENING FOR OTHER VIRAL DISEASES: Primary | ICD-10-CM

## 2020-12-07 ENCOUNTER — HOSPITAL ENCOUNTER (OUTPATIENT)
Facility: CLINIC | Age: 65
Discharge: HOME | End: 2020-12-07
Attending: INTERNAL MEDICINE | Admitting: INTERNAL MEDICINE
Payer: COMMERCIAL

## 2020-12-07 ENCOUNTER — APPOINTMENT (OUTPATIENT)
Dept: MEDSURG UNIT | Facility: CLINIC | Age: 65
End: 2020-12-07
Attending: INTERNAL MEDICINE
Payer: COMMERCIAL

## 2020-12-07 ENCOUNTER — OFFICE VISIT (OUTPATIENT)
Dept: CARDIOLOGY | Facility: CLINIC | Age: 65
End: 2020-12-07
Attending: INTERNAL MEDICINE
Payer: COMMERCIAL

## 2020-12-07 VITALS
OXYGEN SATURATION: 98 % | WEIGHT: 178 LBS | HEART RATE: 103 BPM | BODY MASS INDEX: 29.66 KG/M2 | DIASTOLIC BLOOD PRESSURE: 84 MMHG | HEIGHT: 65 IN | SYSTOLIC BLOOD PRESSURE: 128 MMHG

## 2020-12-07 VITALS
TEMPERATURE: 99 F | HEART RATE: 108 BPM | SYSTOLIC BLOOD PRESSURE: 128 MMHG | RESPIRATION RATE: 16 BRPM | DIASTOLIC BLOOD PRESSURE: 71 MMHG | OXYGEN SATURATION: 99 %

## 2020-12-07 DIAGNOSIS — I50.22 CHRONIC SYSTOLIC HEART FAILURE (H): Chronic | ICD-10-CM

## 2020-12-07 DIAGNOSIS — Z79.4 TYPE 2 DIABETES MELLITUS WITH HYPERGLYCEMIA, WITH LONG-TERM CURRENT USE OF INSULIN (H): ICD-10-CM

## 2020-12-07 DIAGNOSIS — Z94.1 HEART REPLACED BY TRANSPLANT (H): ICD-10-CM

## 2020-12-07 DIAGNOSIS — Z94.1 HEART TRANSPLANT, ORTHOTOPIC, STATUS (H): ICD-10-CM

## 2020-12-07 DIAGNOSIS — E11.65 TYPE 2 DIABETES MELLITUS WITH HYPERGLYCEMIA, WITH LONG-TERM CURRENT USE OF INSULIN (H): ICD-10-CM

## 2020-12-07 DIAGNOSIS — Z23 NEED FOR PROPHYLACTIC VACCINATION AND INOCULATION AGAINST INFLUENZA: Primary | ICD-10-CM

## 2020-12-07 LAB
ALBUMIN SERPL-MCNC: 3.5 G/DL (ref 3.4–5)
ALP SERPL-CCNC: 104 U/L (ref 40–150)
ALT SERPL W P-5'-P-CCNC: 11 U/L (ref 0–70)
ANION GAP SERPL CALCULATED.3IONS-SCNC: 8 MMOL/L (ref 3–14)
AST SERPL W P-5'-P-CCNC: 12 U/L (ref 0–45)
BILIRUB SERPL-MCNC: 0.3 MG/DL (ref 0.2–1.3)
BUN SERPL-MCNC: 34 MG/DL (ref 7–30)
CALCIUM SERPL-MCNC: 8.9 MG/DL (ref 8.5–10.1)
CHLORIDE SERPL-SCNC: 108 MMOL/L (ref 94–109)
CO2 SERPL-SCNC: 23 MMOL/L (ref 20–32)
CREAT SERPL-MCNC: 1.8 MG/DL (ref 0.66–1.25)
ERYTHROCYTE [DISTWIDTH] IN BLOOD BY AUTOMATED COUNT: 15.1 % (ref 10–15)
GFR SERPL CREATININE-BSD FRML MDRD: 38 ML/MIN/{1.73_M2}
GLUCOSE SERPL-MCNC: 162 MG/DL (ref 70–99)
HBA1C MFR BLD: 5.9 % (ref 0–5.6)
HCT VFR BLD AUTO: 38.4 % (ref 40–53)
HGB BLD-MCNC: 11.9 G/DL (ref 13.3–17.7)
MAGNESIUM SERPL-MCNC: 1.6 MG/DL (ref 1.6–2.3)
MCH RBC QN AUTO: 28.6 PG (ref 26.5–33)
MCHC RBC AUTO-ENTMCNC: 31 G/DL (ref 31.5–36.5)
MCV RBC AUTO: 92 FL (ref 78–100)
PHOSPHATE SERPL-MCNC: 3.5 MG/DL (ref 2.5–4.5)
PLATELET # BLD AUTO: 268 10E9/L (ref 150–450)
POTASSIUM SERPL-SCNC: 4.6 MMOL/L (ref 3.4–5.3)
PROT SERPL-MCNC: 6.4 G/DL (ref 6.8–8.8)
RBC # BLD AUTO: 4.16 10E12/L (ref 4.4–5.9)
SODIUM SERPL-SCNC: 139 MMOL/L (ref 133–144)
TACROLIMUS BLD-MCNC: 8.3 UG/L (ref 5–15)
TME LAST DOSE: NORMAL H
WBC # BLD AUTO: 4.6 10E9/L (ref 4–11)

## 2020-12-07 PROCEDURE — G0008 ADMIN INFLUENZA VIRUS VAC: HCPCS | Performed by: INTERNAL MEDICINE

## 2020-12-07 PROCEDURE — 36415 COLL VENOUS BLD VENIPUNCTURE: CPT | Performed by: PHYSICIAN ASSISTANT

## 2020-12-07 PROCEDURE — C1894 INTRO/SHEATH, NON-LASER: HCPCS | Performed by: INTERNAL MEDICINE

## 2020-12-07 PROCEDURE — 85027 COMPLETE CBC AUTOMATED: CPT | Performed by: INTERNAL MEDICINE

## 2020-12-07 PROCEDURE — 93505 ENDOMYOCARDIAL BIOPSY: CPT | Mod: 26 | Performed by: INTERNAL MEDICINE

## 2020-12-07 PROCEDURE — 250N000011 HC RX IP 250 OP 636: Performed by: INTERNAL MEDICINE

## 2020-12-07 PROCEDURE — 93505 ENDOMYOCARDIAL BIOPSY: CPT | Performed by: INTERNAL MEDICINE

## 2020-12-07 PROCEDURE — 88346 IMFLUOR 1ST 1ANTB STAIN PX: CPT | Mod: 26 | Performed by: PATHOLOGY

## 2020-12-07 PROCEDURE — 999N000132 HC STATISTIC PP CARE STAGE 1

## 2020-12-07 PROCEDURE — 80197 ASSAY OF TACROLIMUS: CPT | Performed by: INTERNAL MEDICINE

## 2020-12-07 PROCEDURE — 250N000009 HC RX 250: Performed by: INTERNAL MEDICINE

## 2020-12-07 PROCEDURE — G0463 HOSPITAL OUTPT CLINIC VISIT: HCPCS | Mod: 25

## 2020-12-07 PROCEDURE — 88307 TISSUE EXAM BY PATHOLOGIST: CPT | Mod: 26 | Performed by: PATHOLOGY

## 2020-12-07 PROCEDURE — 80053 COMPREHEN METABOLIC PANEL: CPT | Performed by: INTERNAL MEDICINE

## 2020-12-07 PROCEDURE — 84100 ASSAY OF PHOSPHORUS: CPT | Performed by: INTERNAL MEDICINE

## 2020-12-07 PROCEDURE — 90662 IIV NO PRSV INCREASED AG IM: CPT | Performed by: INTERNAL MEDICINE

## 2020-12-07 PROCEDURE — 88350 IMFLUOR EA ADDL 1ANTB STN PX: CPT | Mod: TC | Performed by: INTERNAL MEDICINE

## 2020-12-07 PROCEDURE — 88307 TISSUE EXAM BY PATHOLOGIST: CPT | Mod: TC | Performed by: INTERNAL MEDICINE

## 2020-12-07 PROCEDURE — 88346 IMFLUOR 1ST 1ANTB STAIN PX: CPT | Mod: TC | Performed by: INTERNAL MEDICINE

## 2020-12-07 PROCEDURE — 88350 IMFLUOR EA ADDL 1ANTB STN PX: CPT | Mod: 26 | Performed by: PATHOLOGY

## 2020-12-07 PROCEDURE — 83735 ASSAY OF MAGNESIUM: CPT | Performed by: INTERNAL MEDICINE

## 2020-12-07 PROCEDURE — 99214 OFFICE O/P EST MOD 30 MIN: CPT | Mod: GC | Performed by: INTERNAL MEDICINE

## 2020-12-07 PROCEDURE — 83036 HEMOGLOBIN GLYCOSYLATED A1C: CPT | Performed by: PHYSICIAN ASSISTANT

## 2020-12-07 PROCEDURE — 272N000001 HC OR GENERAL SUPPLY STERILE: Performed by: INTERNAL MEDICINE

## 2020-12-07 RX ORDER — LIDOCAINE 40 MG/G
CREAM TOPICAL
Status: COMPLETED | OUTPATIENT
Start: 2020-12-07 | End: 2020-12-07

## 2020-12-07 RX ADMIN — LIDOCAINE: 40 CREAM TOPICAL at 09:38

## 2020-12-07 RX ADMIN — INFLUENZA A VIRUS A/MICHIGAN/45/2015 X-275 (H1N1) ANTIGEN (FORMALDEHYDE INACTIVATED), INFLUENZA A VIRUS A/SINGAPORE/INFIMH-16-0019/2016 IVR-186 (H3N2) ANTIGEN (FORMALDEHYDE INACTIVATED), INFLUENZA B VIRUS B/PHUKET/3073/2013 ANTIGEN (FORMALDEHYDE INACTIVATED), AND INFLUENZA B VIRUS B/MARYLAND/15/2016 BX-69A ANTIGEN (FORMALDEHYDE INACTIVATED) 0.7 ML: 60; 60; 60; 60 INJECTION, SUSPENSION INTRAMUSCULAR at 16:55

## 2020-12-07 ASSESSMENT — MIFFLIN-ST. JEOR: SCORE: 1509.28

## 2020-12-07 ASSESSMENT — PAIN SCALES - GENERAL: PAINLEVEL: NO PAIN (0)

## 2020-12-07 NOTE — IP AVS SNAPSHOT
Formerly [facility] [address] [address]                                    After Visit Summary   12/7/2020    Lucian Henderson     MRN: [id number]           After Visit Summary Signature Page    I have received my discharge instructions, and my questions have been answered. I have discussed any challenges I see with this plan with the nurse or doctor.    ..........................................................................................................................................  Patient/Patient Representative Signature      ..........................................................................................................................................  Patient Representative Print Name and Relationship to Patient    ..................................................               ................................................  Date                                   Time    ..........................................................................................................................................  Reviewed by Signature/Title    ...................................................              ..............................................  Date                                               Time          22EPIC Rev 08/18

## 2020-12-07 NOTE — PATIENT INSTRUCTIONS
Patient Instructions  1. Christelle will call with all pending lab/testing results.  2. Continue your good work with exercise.  3. Return in one month.    Next transplant clinic appointment:    Next lab draw: 1/5/21 for 6 month appointment.  Coordinator will call with all pending results.     Please call transplant coordinator with any questions:    Christelle Pettit RN BSN   Post Heart Transplant Nurse Coordinator  [facility]  Questions: [phone]

## 2020-12-07 NOTE — PROGRESS NOTES
Pt back to unit 2a post right heart cath. Right neck site appears clean, dry, intact, and soft. Pt denies pain. Discharge teaching completed with all questions answered and copy to pt.

## 2020-12-07 NOTE — NURSING NOTE
"Transplant Coordinator Note    Reason for visit: 5 month visit  Coordinator: Present   Caregiver:  None due to Covid    Health concerns addressed today:  1. Swelling much improved in legs.  2. Incisions are all healed.  3. Appetite is \"too good\".      Immunosuppressants:  MMF 1000 BID  Tacro 5/4      Labs:   Creat 1.8    Additional Notes:         Labs, RHC reviewed with patient  Medication record reviewed and reconciled  Questions and concerns addressed  Pt verbalized an understanding of plan of care.     Patient Instructions  1. Christelle will call with all pending lab/testing results.  2. Continue your good work with exercise.  3. Return in one month.    Next transplant clinic appointment:    Next lab draw: 1/5/21 for 6 month appointment.  Coordinator will call with all pending results.     Please call transplant coordinator with any questions:    Christelle Pettit RN BSN   Post Heart Transplant Nurse Coordinator  [facility]  Questions: [phone]      "

## 2020-12-07 NOTE — PROGRESS NOTES
[facility]   Interventional Cardiology        Consenting/Education for Right Heart Catheterization/Endomyocardial Biopsy     Patient understands as a part of routine surveillance after heart transplant we will perform a right heart catheterization/endomyocardial biopsy.  This procedure will be performed by Dr. Hudson.    Patient understands during the portion for right heart catheterization a fine tube (catheter) is put into the vein of the groin/neck.  It is carefully passed along until it reaches the heart and then goes up into the blood vessels of the lungs. This is done to measure a variety of pressures in your heart and can tell us how well the heart is filling and emptying, as well as monitor fluid status. While the catheter is in your neck/groin vein, a  Biopsy forceps  or pincers at the end of the catheter are used to take the tissue samples. This is may be repeated to get enough samples. The biopsy pieces are very small (one to two millimeters).     Patient also understands risks and complications of the procedure which include, but are not limited to bruising around the incision site, infection, bleeding, and allergic reaction to local anesthetic.      Patient verbalized understanding of risks and benefits of the right heart catheterization and endomyocardial biopsy and has elected to proceed with the procedure.       Shelia Marinelli, SONA APRN CNP   [facility] Interventional Cardiology

## 2020-12-07 NOTE — NURSING NOTE
Chief Complaint   Patient presents with     Follow Up     [facility] heart transplant return      Vitals were taken and medications were reconciled.     Trudi Carr RMA  4:07 PM

## 2020-12-07 NOTE — LETTER
12/7/2020      RE: Lucian Henderson Sr.  [address] [address]       Dear Colleague,    Thank you for the opportunity to participate in the care of your patient, Lucian Henderson Sr., at the [facility] [place] [facility]. Please see a copy of my visit note below.    ADULT HEART TRANSPLANT CLINIC    DATE OF SERVICE: 12/07/2020    HISTORY OF PRESENT ILNESS:   Mr. Tee Henderson is a 65 year old male with ICM now s/p OHT 7/19/2020 c/b donor strep salvarius bacteremia, T2DM, HTN, Left subclavian surgical wound hematoma with infection and abdominal wound infection who presents to clinic today for routine heart transplant follow-up.    He had primary graft dysfunction with POD1 ECHO EF of 20-25% and severe RV dysfunction thought to be 2/2 prolonged ischemic time. By POD6, ECHO was normal. Since that time, he has continued to improve. He completed a 7 day course of antibiotics for his donor bacteremia. 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    He has been feeling very well, and has no complaints today. He is walking mostly in the mornings. He feels like he is getting stronger. He has no chest pain, sob, pnd, or orthopnea. He continues to have LE edema which is improving.  He denies fever, chills, night sweats, oral lesions, rashes, lightheadedness, dizziness, headaches, palpitations, nausea, vomiting, or diarrhea.       PAST MEDICAL HISTORY:  Past Medical History:   Diagnosis Date     CAD (coronary artery disease)      CHF (congestive heart failure) (H)      CKD (chronic kidney disease), stage III      Cortical cataract of both eyes      Diabetes (H)      Hyperlipidemia      Hypertension      Ischemic cardiomyopathy      Obesity      CHANTEL (obstructive sleep apnea)     occas cpap     Osteoarthritis        FAMILY HISTORY:  Family History   Problem Relation Age of Onset     Diabetes Brother      Diabetes  Sister      Diabetes Sister      Macular Degeneration No family hx of      Glaucoma No family hx of      Myocardial Infarction No family hx of      Kidney Disease No family hx of        SOCIAL HISTORY:  Social History     Socioeconomic History     Marital status:      Spouse name: Not on file     Number of children: 4     Years of education: Not on file     Highest education level: Not on file   Occupational History     Occupation:      Employer: [employer]     Employer: RETIRED   Social Needs     Financial resource strain: Not on file     Food insecurity     Worry: Not on file     Inability: Not on file     Transportation needs     Medical: Not on file     Non-medical: Not on file   Tobacco Use     Smoking status: Never Smoker     Smokeless tobacco: Never Used     Tobacco comment: Never smoked; non-smoking household   Substance and Sexual Activity     Alcohol use: No     Alcohol/week: 0.0 standard drinks     Drug use: No     Sexual activity: Yes     Partners: Female   Lifestyle     Physical activity     Days per week: Not on file     Minutes per session: Not on file     Stress: Not on file   Relationships     Social connections     Talks on phone: Not on file     Gets together: Not on file     Attends Tenriism service: Not on file     Active member of club or organization: Not on file     Attends meetings of clubs or organizations: Not on file     Relationship status: Not on file     Intimate partner violence     Fear of current or ex partner: Not on file     Emotionally abused: Not on file     Physically abused: Not on file     Forced sexual activity: Not on file   Other Topics Concern     Parent/sibling w/ CABG, MI or angioplasty before 65F 55M? No   Social History Narrative     Not on file       CURRENT MEDICATIONS:       acetaminophen (TYLENOL) 325 MG tablet, Take 3 tablets (975 mg) by mouth every 8 hours as needed for mild pain       Alcohol Swabs PADS, Use to swab the area of the  injection or sergio as directed   Per insurance coverage       aspirin (ASA) 81 MG chewable tablet, Take 1 tablet (81 mg) by mouth daily       blood glucose (NO BRAND SPECIFIED) test strip, Use to test blood sugar 2 times daily or as directed.       blood glucose monitoring (ACCU-CHEK FELIPE SMARTVIEW) meter device kit, Use to test blood sugar 3-4 times daily, as directed.       blood glucose monitoring (ONE TOUCH DELICA) lancets, Use to test blood sugars 2 times daily.       calcium carbonate 600 mg-vitamin D 400 units (CALTRATE) 600-400 MG-UNIT per tablet, Take 1 tablet by mouth 2 times daily (with meals)       Continuous Blood Gluc Sensor (FREESTYLE JEREMY 14 DAY SENSOR) [facility], Change every 14 days.       Continuous Blood Gluc Sensor (FREESTYLE JEREMY 14 DAY SENSOR) [facility], Change every 14 days.       furosemide (LASIX) 20 MG tablet, Take 1 tablet (20 mg) by mouth daily       HUMALOG KWIKPEN 100 UNIT/ML soln, Inject 0-31 Units Subcutaneous 3 times daily (with meals) + Custom MEAL and SNACK corrective dosing   Approx 90 units daily max       hydrALAZINE (APRESOLINE) 100 MG tablet, Take 1 tablet (100 mg) by mouth every 8 hours       insulin aspart (NOVOLOG PEN) 100 UNIT/ML pen, Inject 0-31 Units Subcutaneous 3 times daily (with meals) Custom MEAL and SNACK corrective dosing     BG less than 80, do not give Novolog.  BG , give  15 units.  -169, give  17 units.  -199 give 19 units.  -229 give 21 units.  -259 give 23 units.  -289 give 25 units.  -319 give 27 units.  -349 give 29 units.  BG >/= 350 give 31 units.  To be given with meal based on pre-meal blood glucose       insulin isophane human (HUMULIN N PEN) 100 UNIT/ML injection, Inject 35 Units Subcutaneous every morning       insulin isophane human (HUMULIN N PEN) 100 UNIT/ML injection, Inject 8 Units Subcutaneous At Bedtime       insulin pen needle (32G X 4 MM) 32G X 4 MM miscellaneous, Use as directed by provider  Per  "insurance coverage       insulin pen needle (B-D U/F) 31G X 5 MM miscellaneous, 1 Units by Device route 2 times daily Use 2 pen needles daily or as directed.       insulin pen needle (NOVOFINE) 30G X 8 MM miscellaneous, Inject 1 Box Subcutaneous 6 times daily Use 6 pen needles daily or as directed.       magnesium oxide 400 MG CAPS, Take 400 mg by mouth 2 times daily       mycophenolate (GENERIC EQUIVALENT) 250 MG capsule, Take 4 capsules (1,000 mg) by mouth 2 times daily       order for DME, Auto CPAP 8-15 cmH20       pantoprazole (PROTONIX) 40 MG EC tablet, Take 1 tablet (40 mg) by mouth every morning (before breakfast)       polyethylene glycol (MIRALAX) 17 g packet, 17 g by Oral or Feeding Tube route daily as needed for constipation       rosuvastatin (CRESTOR) 10 MG tablet, Take 1 tablet (10 mg) by mouth daily       senna-docusate (SENOKOT-S/PERICOLACE) 8.6-50 MG tablet, Take 1 tablet by mouth 2 times daily (hold for loose stools)       sulfamethoxazole-trimethoprim (BACTRIM) 400-80 MG tablet, Take 1 tablet by mouth daily       tacrolimus (GENERIC EQUIVALENT) 1 MG capsule, Take 5 capsules (5 mg) by mouth every morning AND 4 capsules (4 mg) every evening.       tamsulosin (FLOMAX) 0.4 MG capsule, Take 1 capsule (0.4 mg) by mouth daily         [COMPLETED] lidocaine (LMX4) cream        ROS:  ROS negative unless stated above in the HPI.    EXAM:  /84 (BP Location: Right arm, Patient Position: Chair, Cuff Size: Adult Large)   Pulse 103   Ht 1.651 m (5' 5\")   Wt 80.7 kg (178 lb)   SpO2 98%   BMI 29.62 kg/m      GENERAL: Appears comfortable, in no acute distress.   HEENT: Mucous membranes moist and without lesions. No thrush.   CV: RRR, Normal S1/S2, No murmur, rub, or gallop. No JVP.   RESPIRATORY: Respirations regular, even, and unlabored. Lungs CTA throughout.   GI: Soft and non distended with normoactive bowel sounds present in all quadrants. No tenderness, rebound, guarding. No hepatomegaly. "   EXTREMITIES: 2+ peripheral edema. 2+ bilateral pedal pulses.   NEUROLOGIC: Alert and interacting appropiratly. No focal deficits.   MUSCULOSKELETAL: No joint swelling or tenderness.   SKIN: No jaundice. No rashes or lesions.       Labs - reviewed with patient in clinic today:  CBC RESULTS:  Lab Results   Component Value Date    WBC 4.6 12/07/2020    RBC 4.16 (L) 12/07/2020    HGB 11.9 (L) 12/07/2020    HCT 38.4 (L) 12/07/2020    MCV 92 12/07/2020    MCH 28.6 12/07/2020    MCHC 31.0 (L) 12/07/2020    RDW 15.1 (H) 12/07/2020     12/07/2020       CMP RESULTS:  Lab Results   Component Value Date     12/07/2020    POTASSIUM 4.6 12/07/2020    CHLORIDE 108 12/07/2020    CO2 23 12/07/2020    ANIONGAP 8 12/07/2020     (H) 12/07/2020    BUN 34 (H) 12/07/2020    CR 1.80 (H) 12/07/2020    GFRESTIMATED 38 (L) 12/07/2020    GFRESTBLACK 44 (L) 12/07/2020    BRYANNA 8.9 12/07/2020    BILITOTAL 0.3 12/07/2020    ALBUMIN 3.5 12/07/2020    ALKPHOS 104 12/07/2020    ALT 11 12/07/2020    AST 12 12/07/2020        INR RESULTS:  Lab Results   Component Value Date    INR 1.02 11/10/2020       LIPID RESULTS:  Lab Results   Component Value Date    CHOL 118 08/17/2020    HDL 75 08/17/2020    LDL 26 08/17/2020    TRIG 82 08/17/2020    CHOLHDLRATIO 2.6 06/27/2015       IMMUNOSUPPRESSANT LEVELS:  Lab Results   Component Value Date    TACROL 8.3 12/07/2020    DOSTAC Not Provided 12/07/2020       No components found for: CK  Lab Results   Component Value Date    MAG 1.6 12/07/2020     Lab Results   Component Value Date    A1C 5.9 (H) 12/07/2020     Lab Results   Component Value Date    PHOS 3.5 12/07/2020     Lab Results   Component Value Date    NTBNP 6,187 (H) 11/25/2019     Lab Results   Component Value Date    SAITESTMET SA FCS 10/12/2020    SAICELL Class I 10/12/2020    LE5EZLYWD None 10/12/2020    DZ3DXHTGDK None 10/12/2020    SAIREPCOM  10/12/2020      Test performed by modified procedure. Serum heat inactivated and tested    by a modified ([place]) protocol including fetal calf serum addition.   High-risk, mfi >3,000. Mod-risk, mfi 500-3,000.       Lab Results   Component Value Date    SAIITESTME SA FCS 10/12/2020    SAIICELL Class II 10/12/2020    DT1ETSQKY None 10/12/2020    DJ1MPUGOGA None 10/12/2020    SAIIREPCOM  10/12/2020      Test performed by modified procedure. Serum heat inactivated and tested   by a modified ([place]) protocol including fetal calf serum addition.   High-risk, mfi >3,000. Mod-risk, mfi 500-3,000.       Lab Results   Component Value Date    CSPEC Plasma 10/20/2020       Diagnostic Studies:    RHC 12/07/2020:   RA 3, PA 26/10(18), PCWP 8, TD CO 5.23 , TD CI 2.79    9/23/2020 ECHO  Interpretation Summary  Left ventricular function, chamber size, wall motion, and wall thickness are  normal.The EF is 55-60%.  Right ventricular function, chamber size, wall motion, and thickness are  normal.  Pulmonary artery systolic pressure is normal.  No significant valvular dysfunction.  No pericardial effusion is present.    Assessment/Plan:  Mr. eTe Henderson is a 64 year old male w/ICM now s/p OHT 7/20/2020 c/b donor strep salvarius bacteremia, T2DM, HIT who  presents to clinic today for routine heart transplant follow-up.    # Status post OHT on 7/19/20, history of ICM  # Primary graft dysfunction, resolved  # Chronic immunosuppression  * TTE 7/29/20: LVEF 55-60%, RV size and function are normal.  * RHC 12/07/2020: RA 3, PA 26/10(18), PCWP 8, TD CO 5.23 , TD CI 2.79     Graft Function:   --Volume: euvolemic- Continue lasix 20 mg daily.  --BP: At goal, continue hydralazine 100 mg tid  --HR: 90s-100s.     Immunosuppression:   --Cellcept 1000 mg BID  --Tacrolimus trough 8.3 today (goal 8-12). Continue the same dose.     Serostatus: CMV: D+/R+, EBV: D+/R+, Toxo: D+/R-     Prophylaxis:   --CAV: Aspirin 81 mg and Rosuvastatin 10 mg daily  --PCP: Bactrim single strength daily (lifelong given donor + for toxo)  --GI: Protonix    --Osteoporosis: calcium/vitamin D   --Toxo: D+/R- Bactrim lifelong.      Routine Surveillance:   --DSAs neg today.  --Allomap: Pending    Monitoring  - Given increasing Cr, will hold CORS/IVUS and plan to do it at 1 year follow up.    #CKD  - Stable.  - Will continue to monitor.     #Type 2 DM  - A1c: 5.9%  - Continue to follow up with endocrinology and primary care doctor.    #Donor Streptococcus salivarius bacteremia  - finished course of antibiotics.    # HIT   - HIT antibody positive 7/19  - CORRINE positive. No Heparin products in the future.     # Right internal jugular and axillary vein DVT, non occlusive   # Occlusive thrombus in superficial right cephalic vein   - Finished course of anticoagulation.    # B/l Lower extremity edema  - Gradually improving.  - Continue to elevate your legs at home.    - RHC +biopsy in 1 month.    Vicente Fernández MD   Cardiology fellow  Pager: [phone]    I examined the patient and agree with the assessment and plan of Dr. Fernández.    Arvin Sheridan MD   [facility]  Heart Failure, Transplant, and Mechanical Circulatory Support Cardiology   Cardiovascular Division  [facility]   [phone]                     Please do not hesitate to contact me if you have any questions/concerns.     Sincerely,     Arvin Sheridan MD

## 2020-12-07 NOTE — PROGRESS NOTES
Prepped for RH/Bx.  Denies pain.  Family available for post-procedure transport.  H & P from 10/13/2020, no sedation planned.  Labs pending.  Needs consent.

## 2020-12-07 NOTE — DISCHARGE INSTRUCTIONS
[facility]                        Interventional Cardiology  Discharge Instructions   Post Right Heart Cath      AFTER YOU GO HOME:    DO drink plenty of fluids    DO resume your regular diet and medications unless otherwise instructed by your Primary Physician    Do Not scrub the procedure site vigorously    No lotion or powder to the puncture site for 3 days    CALL YOUR PRIMARY PHYSICIAN IF: You may resume all normal activity.  Monitor neck site for bleeding, swelling, or voice changes. If you notice bleeding or swelling immediately apply pressure to the site and call number below to speak with Cardiology Fellow.  If you experience any changes in your breathing you should call your doctor immediately or come to the closest Emergency Department.  Do not drive yourself.    ADDITIONAL INSTRUCTIONS: Medications: You are to resume all home medications including anticoagulation therapy unless otherwise advised by your primary cardiologist or nurse coordinator.    Follow Up: Per your primary cardiology team    If you have any questions or concerns regarding your procedure site please call [phone] at anytime and ask for Cardiology Fellow on call.  They are available 24 hours a day.  You may also contact the Cardiology Clinic after hours number at [phone].                                                       Telephone Numbers [phone] Monday-Friday 8:00 am to 4:30 pm    [phone] After 4:30 pm Monday-Friday, Weekends & Holidays  Ask for Interventional Cardiologist on call. Someone is on call 24 hours/day   [facility] toll free number [phone] Monday-Friday 8:00 am to 4:30 pm   [facility] Emergency Dept [phone]

## 2020-12-08 ENCOUNTER — VIRTUAL VISIT (OUTPATIENT)
Dept: PHARMACY | Facility: CLINIC | Age: 65
End: 2020-12-08

## 2020-12-08 DIAGNOSIS — Z94.1 HEART TRANSPLANT, ORTHOTOPIC, STATUS (H): Primary | ICD-10-CM

## 2020-12-08 DIAGNOSIS — E11.3513 TYPE 2 DIABETES MELLITUS WITH PROLIFERATIVE RETINOPATHY OF BOTH EYES AND MACULAR EDEMA, UNSPECIFIED WHETHER LONG TERM INSULIN USE (H): ICD-10-CM

## 2020-12-08 DIAGNOSIS — I10 ESSENTIAL HYPERTENSION: ICD-10-CM

## 2020-12-08 DIAGNOSIS — K21.9 GASTROESOPHAGEAL REFLUX DISEASE, UNSPECIFIED WHETHER ESOPHAGITIS PRESENT: ICD-10-CM

## 2020-12-08 DIAGNOSIS — E78.5 HYPERLIPIDEMIA LDL GOAL <100: ICD-10-CM

## 2020-12-08 LAB — COPATH REPORT: NORMAL

## 2020-12-08 PROCEDURE — 99207 PR NO CHARGE LOS: CPT | Mod: TEL | Performed by: PHARMACIST

## 2020-12-08 NOTE — PROGRESS NOTES
ADULT HEART TRANSPLANT CLINIC    DATE OF SERVICE: 12/07/2020    HISTORY OF PRESENT ILNESS:   Mr. Tee Henderson is a 65 year old male with ICM now s/p OHT 7/19/2020 c/b donor strep salvarius bacteremia, T2DM, HTN, Left subclavian surgical wound hematoma with infection and abdominal wound infection who presents to clinic today for routine heart transplant follow-up.    He had primary graft dysfunction with POD1 ECHO EF of 20-25% and severe RV dysfunction thought to be 2/2 prolonged ischemic time. By POD6, ECHO was normal. Since that time, he has continued to improve. He completed a 7 day course of antibiotics for his donor bacteremia. 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    He has been feeling very well, and has no complaints today. He is walking mostly in the mornings. He feels like he is getting stronger. He has no chest pain, sob, pnd, or orthopnea. He continues to have LE edema which is improving.  He denies fever, chills, night sweats, oral lesions, rashes, lightheadedness, dizziness, headaches, palpitations, nausea, vomiting, or diarrhea.       PAST MEDICAL HISTORY:  Past Medical History:   Diagnosis Date     CAD (coronary artery disease)      CHF (congestive heart failure) (H)      CKD (chronic kidney disease), stage III      Cortical cataract of both eyes      Diabetes (H)      Hyperlipidemia      Hypertension      Ischemic cardiomyopathy      Obesity      CHANTEL (obstructive sleep apnea)     occas cpap     Osteoarthritis        FAMILY HISTORY:  Family History   Problem Relation Age of Onset     Diabetes Brother      Diabetes Sister      Diabetes Sister      Macular Degeneration No family hx of      Glaucoma No family hx of      Myocardial Infarction No family hx of      Kidney Disease No family hx of        SOCIAL HISTORY:  Social History     Socioeconomic History     Marital status:      Spouse name: Not on file     Number of children: 4     Years of education: Not on file      Highest education level: Not on file   Occupational History     Occupation:      Employer: "[employer]     Employer: RETIRED   Social Needs     Financial resource strain: Not on file     Food insecurity     Worry: Not on file     Inability: Not on file     Transportation needs     Medical: Not on file     Non-medical: Not on file   Tobacco Use     Smoking status: Never Smoker     Smokeless tobacco: Never Used     Tobacco comment: Never smoked; non-smoking household   Substance and Sexual Activity     Alcohol use: No     Alcohol/week: 0.0 standard drinks     Drug use: No     Sexual activity: Yes     Partners: Female   Lifestyle     Physical activity     Days per week: Not on file     Minutes per session: Not on file     Stress: Not on file   Relationships     Social connections     Talks on phone: Not on file     Gets together: Not on file     Attends Rastafarian service: Not on file     Active member of club or organization: Not on file     Attends meetings of clubs or organizations: Not on file     Relationship status: Not on file     Intimate partner violence     Fear of current or ex partner: Not on file     Emotionally abused: Not on file     Physically abused: Not on file     Forced sexual activity: Not on file   Other Topics Concern     Parent/sibling w/ CABG, MI or angioplasty before 65F 55M? No   Social History Narrative     Not on file       CURRENT MEDICATIONS:       acetaminophen (TYLENOL) 325 MG tablet, Take 3 tablets (975 mg) by mouth every 8 hours as needed for mild pain       Alcohol Swabs PADS, Use to swab the area of the injection or sergio as directed   Per insurance coverage       aspirin (ASA) 81 MG chewable tablet, Take 1 tablet (81 mg) by mouth daily       blood glucose (NO BRAND SPECIFIED) test strip, Use to test blood sugar 2 times daily or as directed.       blood glucose monitoring (ACCU-CHEK FELIPE SMARTVIEW) meter device kit, Use to test blood sugar 3-4 times daily, as  directed.       blood glucose monitoring (ONE TOUCH DELICA) lancets, Use to test blood sugars 2 times daily.       calcium carbonate 600 mg-vitamin D 400 units (CALTRATE) 600-400 MG-UNIT per tablet, Take 1 tablet by mouth 2 times daily (with meals)       Continuous Blood Gluc Sensor (FREESTYLE JEREMY 14 DAY SENSOR) [facility] [place] Change every 14 days.       Continuous Blood Gluc Sensor (FREESTYLE JEREMY 14 DAY SENSOR) [facility], Change every 14 days.       furosemide (LASIX) 20 MG tablet, Take 1 tablet (20 mg) by mouth daily       HUMALOG KWIKPEN 100 UNIT/ML soln, Inject 0-31 Units Subcutaneous 3 times daily (with meals) + Custom MEAL and SNACK corrective dosing   Approx 90 units daily max       hydrALAZINE (APRESOLINE) 100 MG tablet, Take 1 tablet (100 mg) by mouth every 8 hours       insulin aspart (NOVOLOG PEN) 100 UNIT/ML pen, Inject 0-31 Units Subcutaneous 3 times daily (with meals) Custom MEAL and SNACK corrective dosing     BG less than 80, do not give Novolog.  BG , give  15 units.  -169, give  17 units.  -199 give 19 units.  -229 give 21 units.  -259 give 23 units.  -289 give 25 units.  -319 give 27 units.  -349 give 29 units.  BG >/= 350 give 31 units.  To be given with meal based on pre-meal blood glucose       insulin isophane human (HUMULIN N PEN) 100 UNIT/ML injection, Inject 35 Units Subcutaneous every morning       insulin isophane human (HUMULIN N PEN) 100 UNIT/ML injection, Inject 8 Units Subcutaneous At Bedtime       insulin pen needle (32G X 4 MM) 32G X 4 MM miscellaneous, Use as directed by provider  Per insurance coverage       insulin pen needle (B-D U/F) 31G X 5 MM miscellaneous, 1 Units by Device route 2 times daily Use 2 pen needles daily or as directed.       insulin pen needle (NOVOFINE) 30G X 8 MM miscellaneous, Inject 1 Box Subcutaneous 6 times daily Use 6 pen needles daily or as directed.       magnesium oxide 400 MG CAPS, Take 400 mg by mouth 2 times  "daily       mycophenolate (GENERIC EQUIVALENT) 250 MG capsule, Take 4 capsules (1,000 mg) by mouth 2 times daily       order for DME, Auto CPAP 8-15 cmH20       pantoprazole (PROTONIX) 40 MG EC tablet, Take 1 tablet (40 mg) by mouth every morning (before breakfast)       polyethylene glycol (MIRALAX) 17 g packet, 17 g by Oral or Feeding Tube route daily as needed for constipation       rosuvastatin (CRESTOR) 10 MG tablet, Take 1 tablet (10 mg) by mouth daily       senna-docusate (SENOKOT-S/PERICOLACE) 8.6-50 MG tablet, Take 1 tablet by mouth 2 times daily (hold for loose stools)       sulfamethoxazole-trimethoprim (BACTRIM) 400-80 MG tablet, Take 1 tablet by mouth daily       tacrolimus (GENERIC EQUIVALENT) 1 MG capsule, Take 5 capsules (5 mg) by mouth every morning AND 4 capsules (4 mg) every evening.       tamsulosin (FLOMAX) 0.4 MG capsule, Take 1 capsule (0.4 mg) by mouth daily         [COMPLETED] lidocaine (LMX4) cream        ROS:  ROS negative unless stated above in the HPI.    EXAM:  /84 (BP Location: Right arm, Patient Position: Chair, Cuff Size: Adult Large)   Pulse 103   Ht 1.651 m (5' 5\")   Wt 80.7 kg (178 lb)   SpO2 98%   BMI 29.62 kg/m      GENERAL: Appears comfortable, in no acute distress.   HEENT: Mucous membranes moist and without lesions. No thrush.   CV: RRR, Normal S1/S2, No murmur, rub, or gallop. No JVP.   RESPIRATORY: Respirations regular, even, and unlabored. Lungs CTA throughout.   GI: Soft and non distended with normoactive bowel sounds present in all quadrants. No tenderness, rebound, guarding. No hepatomegaly.   EXTREMITIES: 2+ peripheral edema. 2+ bilateral pedal pulses.   NEUROLOGIC: Alert and interacting appropiratly. No focal deficits.   MUSCULOSKELETAL: No joint swelling or tenderness.   SKIN: No jaundice. No rashes or lesions.       Labs - reviewed with patient in clinic today:  CBC RESULTS:  Lab Results   Component Value Date    WBC 4.6 12/07/2020    RBC 4.16 (L) " 12/07/2020    HGB 11.9 (L) 12/07/2020    HCT 38.4 (L) 12/07/2020    MCV 92 12/07/2020    MCH 28.6 12/07/2020    MCHC 31.0 (L) 12/07/2020    RDW 15.1 (H) 12/07/2020     12/07/2020       CMP RESULTS:  Lab Results   Component Value Date     12/07/2020    POTASSIUM 4.6 12/07/2020    CHLORIDE 108 12/07/2020    CO2 23 12/07/2020    ANIONGAP 8 12/07/2020     (H) 12/07/2020    BUN 34 (H) 12/07/2020    CR 1.80 (H) 12/07/2020    GFRESTIMATED 38 (L) 12/07/2020    GFRESTBLACK 44 (L) 12/07/2020    BRYANNA 8.9 12/07/2020    BILITOTAL 0.3 12/07/2020    ALBUMIN 3.5 12/07/2020    ALKPHOS 104 12/07/2020    ALT 11 12/07/2020    AST 12 12/07/2020        INR RESULTS:  Lab Results   Component Value Date    INR 1.02 11/10/2020       LIPID RESULTS:  Lab Results   Component Value Date    CHOL 118 08/17/2020    HDL 75 08/17/2020    LDL 26 08/17/2020    TRIG 82 08/17/2020    CHOLHDLRATIO 2.6 06/27/2015       IMMUNOSUPPRESSANT LEVELS:  Lab Results   Component Value Date    TACROL 8.3 12/07/2020    DOSTAC Not Provided 12/07/2020       No components found for: CK  Lab Results   Component Value Date    MAG 1.6 12/07/2020     Lab Results   Component Value Date    A1C 5.9 (H) 12/07/2020     Lab Results   Component Value Date    PHOS 3.5 12/07/2020     Lab Results   Component Value Date    NTBNP 6,187 (H) 11/25/2019     Lab Results   Component Value Date    SAITESTMET SA FCS 10/12/2020    SAICELL Class I 10/12/2020    [id number] None 10/12/2020    [id number] None 10/12/2020    SAIREPCOM  10/12/2020      Test performed by modified procedure. Serum heat inactivated and tested   by a modified ([place]) protocol including fetal calf serum addition.   High-risk, mfi >3,000. Mod-risk, mfi 500-3,000.       Lab Results   Component Value Date    SAIITESTME SA FCS 10/12/2020    SAIICELL Class II 10/12/2020    [id number] None 10/12/2020    EU2RCIYIET None 10/12/2020    SAIIREPCOM  10/12/2020      Test performed by modified procedure. Serum heat  inactivated and tested   by a modified ([place]) protocol including fetal calf serum addition.   High-risk, mfi >3,000. Mod-risk, mfi 500-3,000.       Lab Results   Component Value Date    [facility] Plasma 10/20/2020       Diagnostic Studies:    RH 12/07/2020:   RA 3, PA 26/10(18), PCWP 8, TD CO 5.23 , TD CI 2.79    9/23/2020 ECHO  Interpretation Summary  Left ventricular function, chamber size, wall motion, and wall thickness are  normal.The EF is 55-60%.  Right ventricular function, chamber size, wall motion, and thickness are  normal.  Pulmonary artery systolic pressure is normal.  No significant valvular dysfunction.  No pericardial effusion is present.    Assessment/Plan:  Mr. Tee Henderson is a 64 year old male w/ICM now s/p OHT 7/20/2020 c/b donor strep salvarius bacteremia, T2DM, HIT who  presents to clinic today for routine heart transplant follow-up.    # Status post OHT on 7/19/20, history of ICM  # Primary graft dysfunction, resolved  # Chronic immunosuppression  * TTE 7/29/20: LVEF 55-60%, RV size and function are normal.  * RHC 12/07/2020: RA 3, PA 26/10(18), PCWP 8, TD CO 5.23 , TD CI 2.79     Graft Function:   --Volume: euvolemic- Continue lasix 20 mg daily.  --BP: At goal, continue hydralazine 100 mg tid  --HR: 90s-100s.     Immunosuppression:   --Cellcept 1000 mg BID  --Tacrolimus trough 8.3 today (goal 8-12). Continue the same dose.     Serostatus: CMV: D+/R+, EBV: D+/R+, Toxo: D+/R-     Prophylaxis:   --CAV: Aspirin 81 mg and Rosuvastatin 10 mg daily  --PCP: Bactrim single strength daily (lifelong given donor + for toxo)  --GI: Protonix   --Osteoporosis: calcium/vitamin D   --Toxo: D+/R- Bactrim lifelong.      Routine Surveillance:   --DSAs neg today.  --Allomap: Pending    Monitoring  - Given increasing Cr, will hold CORS/IVUS and plan to do it at 1 year follow up.    #CKD  - Stable.  - Will continue to monitor.     #Type 2 DM  - A1c: 5.9%  - Continue to follow up with endocrinology and primary care  doctor.    #Donor Streptococcus salivarius bacteremia  - finished course of antibiotics.    # HIT   - HIT antibody positive 7/19  - CORRINE positive. No Heparin products in the future.     # Right internal jugular and axillary vein DVT, non occlusive   # Occlusive thrombus in superficial right cephalic vein   - Finished course of anticoagulation.    # B/l Lower extremity edema  - Gradually improving.  - Continue to elevate your legs at home.    - RHC +biopsy in 1 month.    Vicente Fernández MD   Cardiology fellow  Pager: [phone]    I examined the patient and agree with the assessment and plan of Dr. Fernández.    Arvin Sheridan MD   [facility]  Heart Failure, Transplant, and Mechanical Circulatory Support Cardiology   Cardiovascular Division  [facility]   [phone]

## 2020-12-08 NOTE — PATIENT INSTRUCTIONS
Recommendations from today's MTM visit:                                                      1. At 6 months post transplant you will be due for the following vaccinations: Prevnar 13, Shingrix, Tetanus, and Hepatitis B vaccines. You can get these at your local pharmacy.    It was great to speak with you today.  I value your experience and would be very thankful for your time with providing feedback on our clinic survey. You may receive a survey via email or text message in the next few days.     Next MTM visit: as needed    To schedule another MTM appointment, please call the clinic directly or you may call the MTM scheduling line at [phone] or toll-free at [phone].     My Clinical Pharmacist's contact information:                                                      It was a pleasure talking with you today!  Please feel free to contact me with any questions or concerns you have.      Negrito Rai, PharmD  [facility] Pharmacist    Phone: [phone]

## 2020-12-08 NOTE — PROGRESS NOTES
MTM ENCOUNTER  SUBJECTIVE/OBJECTIVE:                           Lucian Henderson Sr. is a 65 year old male called for a follow-up visit. He was referred to me from txp team.  was present during today's visit. Today's visit is a follow-up MTM visit from 10/07/20     Reason for visit: 5 months post txp.    Allergies/ADRs: Reviewed in chart  Tobacco: He reports that he has never smoked. He has never used smokeless tobacco.  Alcohol: not currently using  Past Medical History: Reviewed in chart    Medication Adherence/Access: Patient uses pill box(es) and has assistance with medication administration: family member, wife, Shivani, and daughter, Elisabeth, help.  Patient takes medications 2 time(s) per day. 7:30am and 7:30pm  Per patient, misses medication 0 times per week.      Heart Transplant:  Current immunosuppressants include TAC 5mg qAM & 4mg qPM, MMF 1000mg BID. Pt reports occasional headaches.   Other meds: Furosemide 20mg once daily. Stable weights 178#.  Transplant date: 7/8/20  Estimated Creatinine Clearance: 39 mL/min (A) (based on SCr of 1.8 mg/dL (H)).  CMV prophylaxis:Complete.    PCP prophylaxis: Bactrim S S daily for 6 months post txp  Supplements: Mag Oxide 400mg BID (separate 2 hours) and Calcium/D BID .  Magnesium   Date Value Ref Range Status   12/07/2020 1.6 1.6 - 2.3 mg/dL Final   Tx Coordinator: Dhruv Baird MD: Moises Santos, Using Med Card: Yes  Immunizations: annual flu shot 2020; [id number]:  2009, 2015; Prevnar 13: unknown; TDaP:  2009; Shingrix: unknown, HBV: no immunity per antibody titers.      Type 2 Diabetes:  Pt currently taking Humulin NPH 25 units qAM Novolog sliding scale starting at 15 units before meals. Pt is not experiencing side effects.  Blood sugar monitoring: 3 time(s) daily. Ranges (patient reported):   Date FBG/ 2hours post Lunch/2hours post Dinner /2hours post   12/8 146     12/7 147 150 160   12/6 159 140 150   12/5 156 175 170   12/4 124 150 160   12/3  83 120 150   12/2 98 130 180   Aspirin: Taking 81mg daily and denies side effects  Urine Albumin:         Lab Results   Component Value Date     UMALCR 3.74 10/03/2018            Lab Results   Component Value Date     A1C 8.2 07/03/2020     A1C 7.9 07/08/2019     A1C 8.5 03/11/2019     A1C 7.6 10/16/2018     A1C 9.8 09/06/2017        Hypertension: Current medications include Hydralazine 100mg TID.  Patient does self-monitor blood pressure. 128/72 P95. Patient reports no current medication side effects.      BP Readings from Last 3 Encounters:   10/05/20 113/67   09/22/20 (!) 142/72   09/14/20 135/70      Hyperlipidemia: Current therapy includes Rosuvastatin 10mg once daily.  Pt reports no significant myalgias or other side effects. Mild random aches, likely unrelated to statin.  The ASCVD Risk score (Gamal MORA Jr., et al., 2013) failed to calculate for the following reasons:    The valid total cholesterol range is 130 to 320 mg/dL          Recent Labs   Lab Test 08/17/20  0847 07/08/19  1021 06/27/15  0755 06/27/15  0755 01/11/15  1033   CHOL 118 104   < > 82 203*   HDL 75 27*   < > 32* 34*   LDL 26 41   < > 31 Cannot estimate LDL when triglyceride exceeds 400 mg/dL  107   TRIG 82 181*   < > 99 519*   CHOLHDLRATIO  --   --   --  2.6 6.0*    < > = values in this interval not displayed.     GERD: Current medications include: Protonix (pantoprazole) 40mg once daily. Pt reports no current symptoms.  Patient feels that current regimen is effective.    Today's Vitals: There were no vitals taken for this visit.      ASSESSMENT:                            Medication Adherence: No issues identified    Heart Transplant:  At 6 months post transplant patient will be due for Shingrix, Prevnar 13, Tetanus, and Hepatitis B booster.      Type 2 Diabetes:  Stable. BG at goal . Long term may want to consider changing from mealtime insulin     Hypertension: Stable.      Hyperlipidemia: Stable.    GERD: Pt is off Prednisone, he  may not need Pantoprazole as this time.     PLAN:                            Txp team consider...  1. Stopping Pantoprazole, pt denies GERD sx.     Pt to...  1. At 6 months post transplant tor lbe due for Shingrix, Prevnar 13, tetanus, and HEP B booster vaccines.    I spent 30 minutes with this patient today. I offer these suggestions for consideration by txp team. A copy of the visit note was provided to the patient's referring provider.    Will follow up as needed.    The patient was mailed a summary of these recommendations.     Negrito Rai, PharmD  [facility] Pharmacist    Phone: [phone]     Patient consented to a telehealth visit: yes  Telemedicine Visit Details  Type of service:  Telephone visit  Start Time: 11:30 AM  End Time: 12:08 PM  Originating Location (patient location): [place]  Distant Location (provider location):  [facility] MEDICATION THERAPY MANAGEMENT  Mode of Communication:  Telephone      Medication Therapy Recommendations Needing Review  Gastroesophageal reflux disease, unspecified whether esophagitis present    Current Medication: pantoprazole (PROTONIX) 40 MG EC tablet   Rationale: No medical indication at this time - Unnecessary medication therapy - Indication   Recommendation: Discontinue Medication   Status: Contact Provider - Awaiting Response         Heart replaced by transplant (H)    Rationale: Preventive therapy - Needs additional medication therapy - Indication   Recommendation: Start Medication - at six months post txp will be due for shignrix, Prevnar 13, Tdap, and HBV booster vaccines   Status: Accepted - no CPA Needed

## 2020-12-08 NOTE — Clinical Note
Blood sugars continue to look good! No recommended changes today. I would suggest  long term trying to get patient off mealtime insulins. You are following up in Feb

## 2020-12-09 ENCOUNTER — APPOINTMENT (OUTPATIENT)
Dept: INTERPRETER SERVICES | Facility: CLINIC | Age: 65
End: 2020-12-09
Payer: COMMERCIAL

## 2020-12-09 ENCOUNTER — HOSPITAL ENCOUNTER (EMERGENCY)
Facility: CLINIC | Age: 65
Discharge: HOME | End: 2020-12-09
Attending: EMERGENCY MEDICINE | Admitting: EMERGENCY MEDICINE
Payer: COMMERCIAL

## 2020-12-09 ENCOUNTER — TELEPHONE (OUTPATIENT)
Dept: TRANSPLANT | Facility: CLINIC | Age: 65
End: 2020-12-09

## 2020-12-09 ENCOUNTER — APPOINTMENT (OUTPATIENT)
Dept: CT IMAGING | Facility: CLINIC | Age: 65
End: 2020-12-09
Attending: EMERGENCY MEDICINE
Payer: COMMERCIAL

## 2020-12-09 VITALS
WEIGHT: 178 LBS | SYSTOLIC BLOOD PRESSURE: 147 MMHG | DIASTOLIC BLOOD PRESSURE: 77 MMHG | OXYGEN SATURATION: 97 % | TEMPERATURE: 98.5 F | HEART RATE: 90 BPM | RESPIRATION RATE: 18 BRPM | HEIGHT: 65 IN | BODY MASS INDEX: 29.66 KG/M2

## 2020-12-09 DIAGNOSIS — H33.23 BILATERAL RETINAL DETACHMENT: ICD-10-CM

## 2020-12-09 LAB
ALBUMIN SERPL-MCNC: 3.3 G/DL (ref 3.4–5)
ALP SERPL-CCNC: 106 U/L (ref 40–150)
ALT SERPL W P-5'-P-CCNC: 12 U/L (ref 0–70)
ANION GAP SERPL CALCULATED.3IONS-SCNC: 5 MMOL/L (ref 3–14)
ANISOCYTOSIS BLD QL SMEAR: SLIGHT
AST SERPL W P-5'-P-CCNC: 8 U/L (ref 0–45)
BASOPHILS # BLD AUTO: 0 10E9/L (ref 0–0.2)
BASOPHILS NFR BLD AUTO: 0 %
BILIRUB SERPL-MCNC: 0.3 MG/DL (ref 0.2–1.3)
BUN SERPL-MCNC: 37 MG/DL (ref 7–30)
CALCIUM SERPL-MCNC: 8.6 MG/DL (ref 8.5–10.1)
CHLORIDE SERPL-SCNC: 106 MMOL/L (ref 94–109)
CO2 SERPL-SCNC: 26 MMOL/L (ref 20–32)
CREAT SERPL-MCNC: 1.92 MG/DL (ref 0.66–1.25)
DIFFERENTIAL METHOD BLD: ABNORMAL
EOSINOPHIL # BLD AUTO: 0 10E9/L (ref 0–0.7)
EOSINOPHIL NFR BLD AUTO: 0.9 %
ERYTHROCYTE [DISTWIDTH] IN BLOOD BY AUTOMATED COUNT: 15.1 % (ref 10–15)
GFR SERPL CREATININE-BSD FRML MDRD: 35 ML/MIN/{1.73_M2}
GLUCOSE SERPL-MCNC: 201 MG/DL (ref 70–99)
HCT VFR BLD AUTO: 34.2 % (ref 40–53)
HGB BLD-MCNC: 10.7 G/DL (ref 13.3–17.7)
INTERPRETATION ECG - MUSE: NORMAL
LABORATORY COMMENT REPORT: NORMAL
LYMPHOCYTES # BLD AUTO: 0.4 10E9/L (ref 0.8–5.3)
LYMPHOCYTES NFR BLD AUTO: 10.4 %
MAGNESIUM SERPL-MCNC: 1.5 MG/DL (ref 1.6–2.3)
MCH RBC QN AUTO: 28.6 PG (ref 26.5–33)
MCHC RBC AUTO-ENTMCNC: 31.3 G/DL (ref 31.5–36.5)
MCV RBC AUTO: 91 FL (ref 78–100)
MICROCYTES BLD QL SMEAR: PRESENT
MONOCYTES # BLD AUTO: 0.2 10E9/L (ref 0–1.3)
MONOCYTES NFR BLD AUTO: 4.3 %
NEUTROPHILS # BLD AUTO: 3.5 10E9/L (ref 1.6–8.3)
NEUTROPHILS NFR BLD AUTO: 84.4 %
PLATELET # BLD AUTO: 262 10E9/L (ref 150–450)
PLATELET # BLD EST: ABNORMAL 10*3/UL
POIKILOCYTOSIS BLD QL SMEAR: ABNORMAL
POLYCHROMASIA BLD QL SMEAR: SLIGHT
POTASSIUM SERPL-SCNC: 4.4 MMOL/L (ref 3.4–5.3)
PROT SERPL-MCNC: 6.1 G/DL (ref 6.8–8.8)
RBC # BLD AUTO: 3.74 10E12/L (ref 4.4–5.9)
SARS-COV-2 RNA SPEC QL NAA+PROBE: NEGATIVE
SARS-COV-2 RNA SPEC QL NAA+PROBE: NORMAL
SODIUM SERPL-SCNC: 137 MMOL/L (ref 133–144)
SPECIMEN SOURCE: NORMAL
SPECIMEN SOURCE: NORMAL
WBC # BLD AUTO: 4.1 10E9/L (ref 4–11)

## 2020-12-09 PROCEDURE — 250N000011 HC RX IP 250 OP 636: Performed by: EMERGENCY MEDICINE

## 2020-12-09 PROCEDURE — 99285 EMERGENCY DEPT VISIT HI MDM: CPT | Mod: 25 | Performed by: EMERGENCY MEDICINE

## 2020-12-09 PROCEDURE — 96365 THER/PROPH/DIAG IV INF INIT: CPT | Performed by: EMERGENCY MEDICINE

## 2020-12-09 PROCEDURE — 93005 ELECTROCARDIOGRAM TRACING: CPT | Performed by: EMERGENCY MEDICINE

## 2020-12-09 PROCEDURE — 70450 CT HEAD/BRAIN W/O DYE: CPT

## 2020-12-09 PROCEDURE — C9803 HOPD COVID-19 SPEC COLLECT: HCPCS | Performed by: EMERGENCY MEDICINE

## 2020-12-09 PROCEDURE — 80053 COMPREHEN METABOLIC PANEL: CPT | Performed by: EMERGENCY MEDICINE

## 2020-12-09 PROCEDURE — 83735 ASSAY OF MAGNESIUM: CPT | Performed by: EMERGENCY MEDICINE

## 2020-12-09 PROCEDURE — 85025 COMPLETE CBC W/AUTO DIFF WBC: CPT | Performed by: EMERGENCY MEDICINE

## 2020-12-09 PROCEDURE — U0003 INFECTIOUS AGENT DETECTION BY NUCLEIC ACID (DNA OR RNA); SEVERE ACUTE RESPIRATORY SYNDROME CORONAVIRUS 2 (SARS-COV-2) (CORONAVIRUS DISEASE [COVID-19]), AMPLIFIED PROBE TECHNIQUE, MAKING USE OF HIGH THROUGHPUT TECHNOLOGIES AS DESCRIBED BY CMS-2020-01-R: HCPCS | Performed by: EMERGENCY MEDICINE

## 2020-12-09 PROCEDURE — 70450 CT HEAD/BRAIN W/O DYE: CPT | Mod: 26 | Performed by: RADIOLOGY

## 2020-12-09 PROCEDURE — 93010 ELECTROCARDIOGRAM REPORT: CPT | Performed by: EMERGENCY MEDICINE

## 2020-12-09 RX ORDER — MAGNESIUM SULFATE HEPTAHYDRATE 40 MG/ML
2 INJECTION, SOLUTION INTRAVENOUS ONCE
Status: COMPLETED | OUTPATIENT
Start: 2020-12-09 | End: 2020-12-09

## 2020-12-09 RX ORDER — MAGNESIUM SULFATE HEPTAHYDRATE 500 MG/ML
2 INJECTION, SOLUTION INTRAMUSCULAR; INTRAVENOUS ONCE
Status: DISCONTINUED | OUTPATIENT
Start: 2020-12-09 | End: 2020-12-09

## 2020-12-09 RX ADMIN — MAGNESIUM SULFATE IN WATER 2 G: 40 INJECTION, SOLUTION INTRAVENOUS at 17:24

## 2020-12-09 ASSESSMENT — MIFFLIN-ST. JEOR: SCORE: 1509.28

## 2020-12-09 ASSESSMENT — VISUAL ACUITY
OS: OTHER (SEE COMMENTS)
OD: OTHER (SEE COMMENTS)

## 2020-12-09 NOTE — TELEPHONE ENCOUNTER
"Returned call to pt's daughter, Elisabeth.  Per Elisabeth, pt c/o loss of vision in one of his eyes.  Writer called pt using .  Pt states he went for a walk, and when he returned he had \"lines in my eyes, and can't see\".  Writer had pt take BP- 140/79.  No other symptoms noted.  Pt denies injury to his eyes or any other changes.  Dr. Sheridan notified.  Pt to go to ER to be evaluated.  ER called with report.  Pt's son will drive pt to the ER.  Pt is agreeable to plan of care.  "

## 2020-12-09 NOTE — ED PROVIDER NOTES
History     Chief Complaint   Patient presents with     Visual Field Defect Evaluation     last time known well 0900     The history is provided by the patient and medical records.     Lucian Henderson Sr. is a 65 year old male with a past medical history of heart transplant in July, CAD, CHF, CKD, T2 diabetes, hypertension, hyperlipidemia, CHANTEL who presents to the emergency department with a chief complaint of vision loss.  The patient stated to the triage nurse that he lost vision in his left eye and is now seeing lines in both eyes.  This started at 9 AM today.  Blood pressure at home when this occurred was 140/79.  No recent injuries.  No other associated symptoms.  No eye pain.  No headache.  Patient denies any difference in vision close up versus far away.    Patient in the room did not say that he lost vision completely and one of his eyes, he just reported that he was seeing lines in his bilateral eyes.  He denies any changes in his depth perception.  He denies any ear pain, hearing changes, headaches, dizziness, lightheadedness, numbness, tingling or weakness, nausea, vomiting, sore throat, chest pain or leg swelling.  He denies any trauma or head injuries.  He denies any recent illnesses or positive sick contacts.  Patient does note that he had a similar episode of vision changes last year for which he saw an ophthalmologist here through [facility].  He reports he has had a prior surgery on his eye.  Per review of patient's chart, patient underwent a phacoemulsification clear cornea procedure on 12/10/2019.    I have reviewed the Medications, Allergies, Past Medical and Surgical History, and Social History in the [employer] Dashboard system.    Past Medical History:   Diagnosis Date     CAD (coronary artery disease)      CHF (congestive heart failure) (H)      CKD (chronic kidney disease), stage III      Cortical cataract of both eyes      Diabetes (H)      Hyperlipidemia      Hypertension      Ischemic  cardiomyopathy      Obesity      CHANTEL (obstructive sleep apnea)     occas cpap     Osteoarthritis      Past Surgical History:   Procedure Laterality Date     C CABG, ARTERY-VEIN, THREE  02/2008     CATARACT IOL, RT/LT       COLONOSCOPY N/A 8/7/2019    Procedure: COLONOSCOPY, WITH POLYPECTOMY AND BIOPSY;  Surgeon: Chauncey Morataya MD;  [facility]     CV ANGIOGRAM CORONARY GRAFT N/A 7/2/2020    Procedure: Angiogram Coronary Graft;  Surgeon: Alex Lantigua MD;  [facility]     CV CORONARY ANGIOGRAM N/A 7/2/2020    Procedure: CV CORONARY ANGIOGRAM;  Surgeon: Alex Lantigua MD;  [facility]     CV HEART BIOPSY N/A 7/27/2020    Procedure: Heart Biopsy;  Surgeon: Mario Burr MD;  [facility]     CV HEART BIOPSY N/A 8/3/2020    Procedure: CV HEART BIOPSY;  Surgeon: Alex Lantigua MD;  [facility]     CV HEART BIOPSY N/A 8/10/2020    Procedure: CV HEART BIOPSY;  Surgeon: Mario Burr MD;  [facility]     CV HEART BIOPSY N/A 8/17/2020    Procedure: CV HEART BIOPSY;  Surgeon: Raimundo Hudson MD;  [facility]     CV HEART BIOPSY N/A 8/31/2020    Procedure: CV HEART BIOPSY;  Surgeon: Moises Santos MD;  [facility]     CV HEART BIOPSY N/A 9/14/2020    Procedure: CV HEART BIOPSY;  Surgeon: Raimundo Hudson MD;  [facility]     CV HEART BIOPSY N/A 9/28/2020    Procedure: CV HEART BIOPSY;  Surgeon: Raimundo Hudson MD;  [facility]     CV HEART BIOPSY N/A 10/12/2020    Procedure: CV HEART BIOPSY;  Surgeon: John Stuart MD;  [facility]     CV HEART BIOPSY N/A 11/10/2020    Procedure: CV HEART BIOPSY;  Surgeon: John Stuart MD;  [facility]     CV HEART BIOPSY N/A 12/7/2020     Procedure: CV HEART BIOPSY;  Surgeon: Raimundo Hudson MD;  [facility]     CV INTRA-AORTIC BALLOON PUMP INSERTION N/A 7/14/2020    Procedure: RHC WITH LEAVE IN SWAN/IABP;  Surgeon: Sonny Saleh MD;  [facility]     CV RIGHT HEART CATH N/A 3/25/2019    Procedure: CV RIGHT HEART CATH;  Surgeon: Moises Santos MD;  [facility]     CV RIGHT HEART CATH N/A 7/10/2019    Procedure: CV RIGHT HEART CATH;  Surgeon: Jak Mccabe MD;  [facility]     CV RIGHT HEART CATH N/A 7/8/2020    Procedure: Right Heart Cath with Leave In swan already has ICU load;  Surgeon: Jak Mccabe MD;  [facility]     CV RIGHT HEART CATH N/A 7/14/2020    Procedure: CV RIGHT HEART CATH;  Surgeon: Sonny Saleh MD;  [facility]     CV RIGHT HEART CATH N/A 7/2/2020    Procedure: CV RIGHT HEART CATH;  Surgeon: Alex Lantigua MD;  [facility]     CV RIGHT HEART CATH N/A 7/27/2020    Procedure: Right Heart Cath;  Surgeon: Mario Burr MD;  [facility]     CV RIGHT HEART CATH N/A 8/3/2020    Procedure: Right Heart Cath;  Surgeon: Alex Lantigua MD;  [facility]     CV RIGHT HEART CATH N/A 8/10/2020    Procedure: CV RIGHT HEART CATH;  Surgeon: Mario Burr MD;  [facility]     CV RIGHT HEART CATH N/A 8/17/2020    Procedure: CV RIGHT HEART CATH;  Surgeon: Raimundo Hudson MD;  [facility]     CV RIGHT HEART CATH N/A 8/31/2020    Procedure: CV RIGHT HEART CATH;  Surgeon: Moises Santos MD;  [facility]     CV RIGHT HEART CATH N/A 9/14/2020    Procedure: CV RIGHT HEART CATH;  Surgeon: TristanRaimundo tineo MD;  Location:  [facility]     CV RIGHT HEART CATH N/A 9/28/2020     Procedure: CV RIGHT HEART CATH;  Surgeon: Raimundo Hudson MD;  Location:  [facility]     CV RIGHT HEART CATH N/A 10/12/2020    Procedure: CV RIGHT HEART CATH;  Surgeon: John Stuart MD;  Location:  [facility]     CV RIGHT HEART CATH N/A 11/10/2020    Procedure: CV RIGHT HEART CATH;  Surgeon: John Stuart MD;  Location:  [facility]     CV RIGHT HEART CATH N/A 12/7/2020    Procedure: CV RIGHT HEART CATH;  Surgeon: Raimundo Hudson MD;  Location:  [facility]     EXTRACTION(S) DENTAL Left 7/13/2020    Procedure: EXTRACTION, TOOTH #11, 12, 13, 15, and 29;  Surgeon: Monica Chao DDS;  [facility]     IMPLANT AUTOMATIC IMPLANTABLE CARDIOVERTER DEFIBRILLATOR       INCISION AND DRAINAGE STERNUM W/ IRRIGATION SYSTEM, COMBINED N/A 9/23/2020    Procedure: INCISION AND DRAINAGE, LEFT SUPRACLAVICULAR WOUND INFECTION AND ABDOMENN WOUND.;  Surgeon: Ran Huertas MD;  [facility]     INSERT INTRAAORTIC BALLOON PUMP N/A 7/16/2020    Procedure: Subclavian Intra-Aortic Balloon Pump Placement;  Surgeon: Allan Sparrow MD;  [facility]     IRRIGATION AND DEBRIDEMENT CHEST WASHOUT, COMBINED N/A 9/28/2020    Procedure: IRRIGATION AND DEBRIDEMENT OF LEFT UPPER CHEST WOUND.;  Surgeon: Ran Huertas MD;  [facility]     PHACOEMULSIFICATION CLEAR CORNEA WITH STANDARD IOL, VITRECTOMY PARSPLANA 25 GAGUE, COMBINED Left 12/10/2019    Procedure: PHACOEMULSIFICATION, CATARACT, CLEAR CORNEAL INCISION APPROACH, W STD INTRAOCULAR LENS IMPLANT INSERT + VITRECTOMY BY PARS PLANA  USING 25-GAUGE INSTRUMENTS. ENDOLASER, INFUSION OF 20% SF6 GAS;  Surgeon: Triny Bunch MD;  [facility]     PICC DOUBLE LUMEN PLACEMENT Left 07/28/2020    5Fr - 42cm, Basilic vein, mid SVC     PICC INSERTION Right 07/11/2020    basilic 44 cm total      TRANSPLANT HEART RECIPIENT N/A 7/19/2020    Procedure: REDO MEDIAN STERNOTOMY, TRANSPLANT,  ORTHOTOPIC HEART, RECIPIENT, ON PUMP OXYGENATOR, REMOVAL OF CARDIAC DEFIBRILLATOR AND LEAD;  Surgeon: Griselli, Massimo, MD;  [facility]     No current facility-administered medications for this encounter.      Current Outpatient Medications   Medication     acetaminophen (TYLENOL) 325 MG tablet     Alcohol Swabs PADS     aspirin (ASA) 81 MG chewable tablet     blood glucose (NO BRAND SPECIFIED) test strip     blood glucose monitoring (ACCU-CHEK FELIPE SMARTVIEW) meter device kit     blood glucose monitoring (ONE TOUCH DELICA) lancets     calcium carbonate 600 mg-vitamin D 400 units (CALTRATE) 600-400 MG-UNIT per tablet     Continuous Blood Gluc Sensor (FREESTYLE JEREMY 14 DAY SENSOR) MISC     Continuous Blood Gluc Sensor (FREESTYLE JEREMY 14 DAY SENSOR) MISC     furosemide (LASIX) 20 MG tablet     HUMALOG KWIKPEN 100 UNIT/ML soln     hydrALAZINE (APRESOLINE) 100 MG tablet     insulin aspart (NOVOLOG PEN) 100 UNIT/ML pen     insulin isophane human (HUMULIN N PEN) 100 UNIT/ML injection     insulin isophane human (HUMULIN N PEN) 100 UNIT/ML injection     insulin pen needle (32G X 4 MM) 32G X 4 MM miscellaneous     insulin pen needle (B-D U/F) 31G X 5 MM miscellaneous     insulin pen needle (NOVOFINE) 30G X 8 MM miscellaneous     magnesium oxide 400 MG CAPS     mycophenolate (GENERIC EQUIVALENT) 250 MG capsule     order for DME     pantoprazole (PROTONIX) 40 MG EC tablet     polyethylene glycol (MIRALAX) 17 g packet     rosuvastatin (CRESTOR) 10 MG tablet     senna-docusate (SENOKOT-S/PERICOLACE) 8.6-50 MG tablet     sulfamethoxazole-trimethoprim (BACTRIM) 400-80 MG tablet     tacrolimus (GENERIC EQUIVALENT) 1 MG capsule     tamsulosin (FLOMAX) 0.4 MG capsule     Allergies   Allergen Reactions     Heparin Heparin Induced Thrombocytopenia     HIT screen sent 7/18/2020. CORRINE confirmed positive     Past medical history, past surgical history, medications, and allergies were reviewed with the patient. Additional pertinent  items: None    Social History     Socioeconomic History     Marital status:      Spouse name: Not on file     Number of children: 4     Years of education: Not on file     Highest education level: Not on file   Occupational History     Occupation:      Employer: [employer]     Employer: RETIRED   Social Needs     Financial resource strain: Not on file     Food insecurity     Worry: Not on file     Inability: Not on file     Transportation needs     Medical: Not on file     Non-medical: Not on file   Tobacco Use     Smoking status: Never Smoker     Smokeless tobacco: Never Used     Tobacco comment: Never smoked; non-smoking household   Substance and Sexual Activity     Alcohol use: No     Alcohol/week: 0.0 standard drinks     Drug use: No     Sexual activity: Yes     Partners: Female   Lifestyle     Physical activity     Days per week: Not on file     Minutes per session: Not on file     Stress: Not on file   Relationships     Social connections     Talks on phone: Not on file     Gets together: Not on file     Attends Anabaptism service: Not on file     Active member of club or organization: Not on file     Attends meetings of clubs or organizations: Not on file     Relationship status: Not on file     Intimate partner violence     Fear of current or ex partner: Not on file     Emotionally abused: Not on file     Physically abused: Not on file     Forced sexual activity: Not on file   Other Topics Concern     Parent/sibling w/ CABG, MI or angioplasty before 65F 55M? No   Social History Narrative     Not on file     Social history was reviewed with the patient. Additional pertinent items: None    Review of Systems  General: No fevers or chills  Skin: No rash or diaphoresis  Eyes: See HPI  Ears/Nose/Throat: No rhinorrhea , nasal congestion, ear pain or hearing changes  Respiratory: No cough or SOB  Cardiovascular: No chest pain or palpitations  Gastrointestinal: No nausea, vomiting, or  "diarrhea  Genitourinary: No urinary frequency, hematuria, or dysuria  Musculoskeletal: No arthralgias or myalgias  Neurologic: No numbness, weakness, headache, dizziness, lightheadedness  Psychiatric: No depression or SI  Hematologic/Lymphatic/Immunologic: No leg swelling, no easy bruising/bleeding  Endocrine: No polyuria/polydypsia    A complete review of systems was performed with pertinent positives and negatives noted in the HPI, and all other systems negative.    Physical Exam   BP: 131/64  Pulse: 104  Temp: 98.5  F (36.9  C)  Resp: 18  Height: 165.1 cm (5' 5\")  Weight: 80.7 kg (178 lb)  SpO2: 97 %      General: Well nourished, well developed, NAD  HEENT: EOMI, anicteric. NCAT, MMM, left pupil irregular and not responsive to light (may be baseline due to patient surgical history), right pupil is round and reactive to light  Neck: no jugular venous distension, supple, nl ROM  Cardiac: Regular rate and rhythm.  Intact peripheral pulses  Pulm: Airway patent, nonlabored breathing  Skin: Warm and dry to the touch.  No rash  Extremities: No LE edema, no cyanosis, w/w/p  Neuro: A&Ox3, no gross focal deficits    ED Course        Procedures                EKG Interpretation:      Interpreted by Hiral Finn MD  Time reviewed:1532   Symptoms at time of EKG: chest pain  Rhythm: normal sinus   Rate: normal  Axis: NORMAL  Ectopy: none  Conduction: Short MA, otherwise normal  ST Segments/ T Waves: No ST-T wave changes  Q Waves: none  Comparison to prior: Unchanged from September 22, 2020 and August 24, 2020    Clinical Impression: normal EKG                    Labs Ordered and Resulted from Time of ED Arrival Up to the Time of Departure from the ED   CBC WITH PLATELETS DIFFERENTIAL - Abnormal; Notable for the following components:       Result Value    RBC Count 3.74 (*)     Hemoglobin 10.7 (*)     Hematocrit 34.2 (*)     MCHC 31.3 (*)     RDW 15.1 (*)     Absolute Lymphocytes 0.4 (*)     All other components within " normal limits   COMPREHENSIVE METABOLIC PANEL - Abnormal; Notable for the following components:    Glucose 201 (*)     Urea Nitrogen 37 (*)     Creatinine 1.92 (*)     GFR Estimate 35 (*)     GFR Estimate If Black 41 (*)     Albumin 3.3 (*)     Protein Total 6.1 (*)     All other components within normal limits   MAGNESIUM - Abnormal; Notable for the following components:    Magnesium 1.5 (*)     All other components within normal limits   PERIPHERAL IV CATHETER   VISION CHECKS            Results for orders placed or performed during the hospital encounter of 12/09/20 (from the past 24 hour(s))   EKG 12-lead, tracing only   Result Value Ref Range    Interpretation ECG Click View Image link to view waveform and result    CBC with platelets differential   Result Value Ref Range    WBC 4.1 4.0 - 11.0 10e9/L    RBC Count 3.74 (L) 4.4 - 5.9 10e12/L    Hemoglobin 10.7 (L) 13.3 - 17.7 g/dL    Hematocrit 34.2 (L) 40.0 - 53.0 %    MCV 91 78 - 100 fl    MCH 28.6 26.5 - 33.0 pg    MCHC 31.3 (L) 31.5 - 36.5 g/dL    RDW 15.1 (H) 10.0 - 15.0 %    Platelet Count 262 150 - 450 10e9/L    Diff Method Manual Differential     % Neutrophils 84.4 %    % Lymphocytes 10.4 %    % Monocytes 4.3 %    % Eosinophils 0.9 %    % Basophils 0.0 %    Absolute Neutrophil 3.5 1.6 - 8.3 10e9/L    Absolute Lymphocytes 0.4 (L) 0.8 - 5.3 10e9/L    Absolute Monocytes 0.2 0.0 - 1.3 10e9/L    Absolute Eosinophils 0.0 0.0 - 0.7 10e9/L    Absolute Basophils 0.0 0.0 - 0.2 10e9/L    Anisocytosis Slight     Poikilocytosis Moderate     Polychromasia Slight     Microcytes Present     Platelet Estimate Confirming automated cell count    Comprehensive metabolic panel   Result Value Ref Range    Sodium 137 133 - 144 mmol/L    Potassium 4.4 3.4 - 5.3 mmol/L    Chloride 106 94 - 109 mmol/L    Carbon Dioxide 26 20 - 32 mmol/L    Anion Gap 5 3 - 14 mmol/L    Glucose 201 (H) 70 - 99 mg/dL    Urea Nitrogen 37 (H) 7 - 30 mg/dL    Creatinine 1.92 (H) 0.66 - 1.25 mg/dL    GFR  Estimate 35 (L) >60 mL/min/[1.73_m2]    GFR Estimate If Black 41 (L) >60 mL/min/[1.73_m2]    Calcium 8.6 8.5 - 10.1 mg/dL    Bilirubin Total 0.3 0.2 - 1.3 mg/dL    Albumin 3.3 (L) 3.4 - 5.0 g/dL    Protein Total 6.1 (L) 6.8 - 8.8 g/dL    Alkaline Phosphatase 106 40 - 150 U/L    ALT 12 0 - 70 U/L    AST 8 0 - 45 U/L   Magnesium   Result Value Ref Range    Magnesium 1.5 (L) 1.6 - 2.3 mg/dL   CT Head w/o Contrast    Narrative    CT HEAD W/O CONTRAST 12/9/2020 3:50 PM    History: Focal neuro deficit, > 6 hrs, stroke suspected; vision loss   ICD-10:    Comparison: Head CT 7/9/2019.    Technique: Using multidetector thin collimation helical acquisition  technique, axial, coronal and sagittal CT images from the skull base  to the vertex were obtained without intravenous contrast.    Findings: There is no intracranial hemorrhage, mass effect, or midline  shift. Unchanged anterolateral left frontal encephalomalacia. No new  loss of gray-white differentiation. Mild generalized parenchymal  volume loss. Ventricles are proportionate to the cerebral sulci. The  basal cisterns are clear. Very mild leukoaraiosis, unchanged.    The bony calvaria and the bones of the skull base are normal. The  visualized portions of the paranasal sinuses and mastoid air cells are  clear. Left pseudophakia.      Impression    Impression:  1. No acute intracranial pathology.   2. Unchanged old left frontal encephalomalacia.    HERMELINDO MONTALVO MD     *Note: Due to a large number of results and/or encounters for the requested time period, some results have not been displayed. A complete set of results can be found in Results Review.       Labs, vital signs, and imaging studies were reviewed by me.    Medications   magnesium sulfate 2 g in water intermittent infusion (0 g Intravenous Stopped 12/9/20 1822)       Assessments & Plan (with Medical Decision Making)   Lucian Oliveira Henderson . is a 65 year old male who presents to the emergency department  with vision loss.  Differential diagnosis includes CVA, central retinal artery occlusion, retinal detachment, central retinal vein occlusion, hyperglycemia.  Labs, head CT ordered to further evaluate the patient.  Ophthalmology was consulted.  Patient denies any acute pain.    EKG is normal.    Head CT shows NAD    Visual acuity testing was done in the ER, pt reported he was unable to see any letters.    Labs are unremarkable aside from Mg 1.5, which was replaced in the ER    1620 Pt d/w ophthalmology, they will come evaluate the patient in the ER    1810 The pt was d/w ophthalmology, they saw the pt in the ER and note that the pt has BL retinal detachments. L happened in recent past (patient admitted to them that he had lost vision in his left eye sometime ago), R likely today. They recommend surgical management, either this evening or tomorrow. STAT covid test ordered.    I have reviewed the nursing notes.    I have reviewed the findings, diagnosis, plan and need for follow up with the patient.    Patient was discussed with ophthalmology, they will plan on seeing the patient in ophthalmology clinic tomorrow morning at 7 AM and likely planning for surgery there.  Recommend discharge home this evening.  No further recommendations at this time    Patient to be discharged home. Advised to follow up with ophthalmology tomorrow morning as directed. To return to ER immediately with any new/worsening symptoms. Plan of care discussed with patient who expresses understanding and agrees with plan of care.      New Prescriptions    No medications on file       Final diagnoses:   Bilateral retinal detachment     Lloyd FONTAINE, antoine serving as a trained medical scribe to document services personally performed by Hiral Finn MD, based on the provider's statements to me.     Hiral FONTAINE MD, was physically present and have reviewed and verified the accuracy of this note documented by Lloyd  Niko.    Hiral Finn MD  12/9/2020   [facility] EMERGENCY DEPARTMENT     Hiral Finn MD  12/09/20 [id number]

## 2020-12-09 NOTE — ED NOTES
Bed: ED27  Expected date:   Expected time:   Means of arrival: Car  Comments:  Lucian Henderson [id number]    Heart Transplant in July  Lost vision in one eye and seeing lines in both eyes

## 2020-12-09 NOTE — CONSULTS
OPHTHALMOLOGY CONSULT NOTE  12/09/20    Patient: Lucian Henderson SrMerly      HISTORY OF PRESENTING ILLNESS:     Lucian Henderson Sr. is a 65 year old male who presents for right eye vision loss since 9:00 AM today. This morning went for a walk and started to see black streaks falling down into his right eye. Says he has not been seeing out of his left eye for some months now.     Medical history significant for:  ICM and HFrEF s/p OHT 7/19/2020, DMII with PDR each eye , CKD stage 3, RUE DVT c/b HIT, and HTN.  @ months ago he had pacer pocket infection and has since been removed.     He is immunosuppressed with medications for his heart transplant on tacrolimus.     10+ review of systems were otherwise negative except for that which has been stated above.      OCULAR/MEDICAL/SURGICAL HISTORIES:     Past Ocular History:  PDR each eye s/p intravitreal injections right eye last 12/19  Hx Vit Heme right eye   PPV, membrane Peel, CE IOL gas/fluid exchange left eye 12/19 Dr. Horta.     OCULAR IMAGING  OCT 2-6-20  right eye: good foveal contour; few tiny drusen   left eye: good foveal contour; tiny cystic changes ; few tiny drusen     Past Medical History:   Diagnosis Date     CAD (coronary artery disease)      CHF (congestive heart failure) (H)      CKD (chronic kidney disease), stage III      Cortical cataract of both eyes      Diabetes (H)      Hyperlipidemia      Hypertension      Ischemic cardiomyopathy      Obesity      CHANTEL (obstructive sleep apnea)     occas cpap     Osteoarthritis        Past Surgical History:   Procedure Laterality Date     C CABG, ARTERY-VEIN, THREE  02/2008     CATARACT IOL, RT/LT       COLONOSCOPY N/A 8/7/2019    Procedure: COLONOSCOPY, WITH POLYPECTOMY AND BIOPSY;  Surgeon: Chauncey Morataya MD;  Location:  GI     CV ANGIOGRAM CORONARY GRAFT N/A 7/2/2020    Procedure: Angiogram Coronary Graft;  Surgeon: Alex Lantigua MD;  Location:  HEART CARDIAC CATH LAB      CV CORONARY ANGIOGRAM N/A 7/2/2020    Procedure: CV CORONARY ANGIOGRAM;  Surgeon: Alex Lantigua MD;  Location: [facility] CATH LAB     CV HEART BIOPSY N/A 7/27/2020    Procedure: Heart Biopsy;  Surgeon: Mario Burr MD;  Location: [facility] HE[facility] CARDIAC CATH LAB     CV HEART BIOPSY N/A 8/3/2020    Procedure: CV HEART BIOPSY;  Surgeon: Alex Lantigua MD;  Location: [facility] CARDIAC CATH LAB     CV HEART BIOPSY N/A 8/10/2020    Procedure: CV HEART BIOPSY;  Surgeon: Mario Burr MD;  Location: [facility] HE[facility] CARDIAC CATH LAB     CV HEART BIOPSY N/A 8/17/2020    Procedure: CV HEART BIOPSY;  Surgeon: Raimundo Hudson MD;  Location: [facility] HE[facility] CARDIAC CATH LAB     CV HEART BIOPSY N/A 8/31/2020    Procedure: CV HEART BIOPSY;  Surgeon: Moises Santos MD;  Location:  HEART CARDIAC CATH LAB     CV HEART BIOPSY N/A 9/14/2020    Procedure: CV HEART BIOPSY;  Surgeon: Raimundo Hudson MD;  Location: [facility]     CV HEART BIOPSY N/A 9/28/2020    Procedure: CV HEART BIOPSY;  Surgeon: Raimundo Hudson MD;  Location:  HEART CARDIAC CATH LAB     CV HEART BIOPSY N/A 10/12/2020    Procedure: CV HEART BIOPSY;  Surgeon: John Stuart MD;  Location:  HEART CARDIAC CATH LAB     CV HEART BIOPSY N/A 11/10/2020    Procedure: CV HEART BIOPSY;  Surgeon: John Stuart MD;  Location:  HEART CARDIAC CATH LAB     CV HEART BIOPSY N/A 12/7/2020    Procedure: CV HEART BIOPSY;  Surgeon: Raimundo Hudson MD;  Location:  HEART CARDIAC CATH LAB     CV INTRA-AORTIC BALLOON PUMP INSERTION N/A 7/14/2020    Procedure: RHC WITH LEAVE IN SWAN/IABP;  Surgeon: Sonny Saleh MD;  Location:  HEART CARDIAC CATH LAB     CV RIGHT HEART CATH N/A 3/25/2019    Procedure: CV RIGHT HEART CATH;  Surgeon: Moises Santos MD;  Location:  HEART CARDIAC CATH LAB     CV RIGHT HEART CATH N/A 7/10/2019    Procedure: CV RIGHT HEART CATH;  Surgeon: Jak Mccabe  MD Deny;  Location: [facility]ATH LAB     CV RIGHT HEART CATH N/A 7/8/2020    Procedure: Right Heart Cath with Leave In Ker[place] already has ICU load;  Surgeon: Jak Mccabe MD;  Location: [facility]     CV RIGHT HEART CATH N/A 7/14/2020    Procedure: CV RIGHT HEART CATH;  Surgeon: Sonny Saleh MD;  Location: [facility]     CV RIGHT HEART CATH N/A 7/2/2020    Procedure: CV RIGHT HEART CATH;  Surgeon: Alex Lantigua MD;  Location: [facility]     CV RIGHT HEART CATH N/A 7/27/2020    Procedure: Right Heart Cath;  Surgeon: Mario Burr MD;  Location:  HEART CARDIAC CATH LAB     CV RIGHT HEART CATH N/A 8/3/2020    Procedure: Right Heart Cath;  Surgeon: Alex Lantigua MD;  Location: [facility]     CV RIGHT HEART CATH N/A 8/10/2020    Procedure: CV RIGHT HEART CATH;  Surgeon: Mario Burr MD;  Location: [facility]     CV RIGHT HEART CATH N/A 8/17/2020    Procedure: CV RIGHT HEART CATH;  Surgeon: Raimundo Hudson MD;  Location:  HEART CARDIAC CATH LAB     CV RIGHT HEART CATH N/A 8/31/2020    Procedure: CV RIGHT HEART CATH;  Surgeon: Moises Santos MD;  Location:  HEART CARDIAC CATH LAB     CV RIGHT HEART CATH N/A 9/14/2020    Procedure: CV RIGHT HEART CATH;  Surgeon: Raimundo Hudson MD;  Location: [facility] C[facility]     CV RIGHT HEART CATH N/A 9/28/2020    Procedure: CV RIGHT HEART CATH;  Surgeon: Raimundo Hudson MD;  Location: [facility] CATH LAB     CV RIGHT HEART CATH N/A 10/12/2020    Procedure: CV RIGHT HEART CATH;  Surgeon: John Stuart MD;  Location: [facility] CATH LAB     CV RIGHT HEART CATH N/A 11/10/2020    Procedure: CV RIGHT HEART CATH;  Surgeon: John Stuart MD;  Location: [facility]     CV RIGHT HEART CATH N/A 12/7/2020    Procedure: CV RIGHT HEART CATH;  Surgeon: Raimundo Hudson,  MD;  Location:  HEART CARDIAC CATH LAB     EXTRACTION(S) DENTAL Left 7/13/2020    Procedure: EXTRACTION, TOOTH #11, 12, 13, 15, and 29;  Surgeon: Monica Chao DDS;  Location: [facility] OR     IMPLANT AUTOMATIC IMPLANTABLE CARDIOVERTER DEFIBRILLATOR       INCISION AND DRAINAGE STERNUM W/ IRRIGATION SYSTEM, COMBINED N/A 9/23/2020    Procedure: INCISION AND DRAINAGE, LEFT SUPRACLAVICULAR WOUND INFECTION AND ABDOMENN WOUND.;  Surgeon: Ran Huertas MD;  Location[facility]     INSERT INTRAAORTIC BALLOON PUMP N/A 7/16/2020    Procedure: Subclavian Intra-Aortic Balloon Pump Placement;  Surgeon: Allan Sparrow MD;  [facility]     IRRIGATION AND DEBRIDEMENT CHEST WASHOUT, COMBINED N/A 9/28/2020    Procedure: IRRIGATION AND DEBRIDEMENT OF LEFT UPPER CHEST WOUND.;  Surgeon: Ran Huertas MD;  [facility]     PHACOEMULSIFICATION CLEAR CORNEA WITH STANDARD IOL, VITRECTOMY PARSPLANA 25 GAGUE, COMBINED Left 12/10/2019    Procedure: PHACOEMULSIFICATION, CATARACT, CLEAR CORNEAL INCISION APPROACH, W STD INTRAOCULAR LENS IMPLANT INSERT + VITRECTOMY BY PARS PLANA  USING 25-GAUGE INSTRUMENTS. ENDOLASER, INFUSION OF 20% SF6 GAS;  Surgeon: Triny Bunch MD;  Location: [facility] OR     PICC DOUBLE LUMEN PLACEMENT Left 07/28/2020    5Fr - 42cm, Basilic vein, mid SVC     PICC INSERTION Right 07/11/2020    basilic 44 cm total      TRANSPLANT HEART RECIPIENT N/A 7/19/2020    Procedure: REDO MEDIAN STERNOTOMY, TRANSPLANT, ORTHOTOPIC HEART, RECIPIENT, ON PUMP OXYGENATOR, REMOVAL OF CARDIAC DEFIBRILLATOR AND LEAD;  Surgeon: Griselli, Massimo, MD;  Location:  OR       EXAMINATION:     Base Eye Exam     Visual Acuity (Snellen - Linear)       Right Left    Near sc 20/200 HM eccentric          Tonometry (Tonopen, 4:53 PM)       Right Left    Pressure 22 18          Pupils       Dark Light Shape React APD    Right 3 2 Round Brisk None    Left 4 3 Round Slow 1+ yes           Visual Fields       Left Right      Full    Restrictions Total  superior temporal, inferior temporal deficiencies; Partial inner inferior nasal deficiency           Extraocular Movement       Right Left     Full, Ortho Full, Ortho          Neuro/Psych     Oriented x3: Yes    Mood/Affect: Normal            Slit Lamp and Fundus Exam     External Exam       Right Left    External Normal Normal          Slit Lamp Exam       Right Left    Lids/Lashes Ptosis OD>OS Ptosis OD>OS    Conjunctiva/Sclera White and quiet White and quiet    Cornea Clear Clear    Anterior Chamber Deep and quiet Deep and quiet    Iris Dilated Dilated    Lens 2+ NSC, 2+ cortical, 2+ PSC pciol    Vitreous VH Avitric          Fundus Exam       Right Left    Disc hazy view, heme along superior and inferior border, ?NVD; dense vitreous adhesion stained with heme along superior border that extends anteriorly (?bleeding into cloquet's canal) mild pallor    Macula hazy view, appears attached but blunted detached    Vessels attenuated     Periphery hazy view, 360 laser, traction superiorly total detachment with posterior PVR, large hole temporal                     ASSESSMENT/PLAN:     Lucian Henderson Sr. is a 65 year old male who presents for the following:    # Vitreous hemorrhage, right eye   - VA 20/200 - consistent with vit heme obscuring macula   - DDx: PDR vs hemorrhagic PVD vs retinal tear   - No obvious RT/RD on exam but hazy view   - B-scan 12/09/20:     OD: hyperechoic mobile vitreous material, no gross RD, vitreous adhesions superiorly    OS: total RD with PVR    - Follow up tomorrow AM at [facility] at 7:30 AM with Dr. Bunch   - Plan for VTD, B-scan OU, OCT   - NPO at [place]   - COVID swab today as precaution for surgery     # Total retinal detachment, LEFT eye    - partial extramacular TRD noted in 2019   - s/p PPV/EL/MP/AFx/SF6 12/2019 with Julio C   - Lost to follow up since 2/2020 - VA 20/40 at that time   - 12/09/20 Extensive PVR and total RD, suspect onset months based on  history and exam   - Monocular precautions    # DM2 w/ PDR both eyes   - s/p PRP OS 6/5/19   - s/p PRP OD 6/12/19   - s/p Avastin OU   - Lost to follow up since 2/2020    - A1c 5.9 (12/7/20), 8.2 (7/3/20)   - ?NVD left eye 12/09/20     # Cataract, RIGHT eye    # Pseudophakia, LEFT eye      It is our pleasure to participate in this patient's care and treatment. Please contact us with any further questions or concerns.      Brittany Osullivan MD  Ophthalmology PGY2  [facility]  Pager: [phone]    Chauncey Ho MD  Ophthalmology PGY-3  [facility]     Discussed with Dr. Snowden, Vitreoretinal surgeon    ATTESTATION     Attending Physician Attestation:     I have not examined this patient. I have discussed the case and the management of this patient's care with the Resident/Fellow. I also have reviewed and agree with the assessment and plan as stated above and agree with all of its relevant components.    Ninfa Snowden MD, PhD  , Vitreoretinal Surgery  Department of Ophthalmology  [facility]         ABDOMINAL PAIN/NAUSEA/VOMITING

## 2020-12-09 NOTE — ED TRIAGE NOTES
"Lucian comes to the ED today for evaluation of \"lines in my eyes\" since 0900.  He is reporting acute vision loss.  Denies HA.  "

## 2020-12-09 NOTE — ED AVS SNAPSHOT
[facility] Emergency Department  [address] [address]  Phone: [phone]                                    Lucian Henderson    MRN: [id number]    Department: [facility] Emergency Department   Date of Visit: 12/9/2020           After Visit Summary Signature Page    I have received my discharge instructions, and my questions have been answered. I have discussed any challenges I see with this plan with the nurse or doctor.    ..........................................................................................................................................  Patient/Patient Representative Signature      ..........................................................................................................................................  Patient Representative Print Name and Relationship to Patient    ..................................................               ................................................  Date                                   Time    ..........................................................................................................................................  Reviewed by Signature/Title    ...................................................              ..............................................  Date                                               Time          22EPIC Rev 08/18

## 2020-12-10 ENCOUNTER — OFFICE VISIT (OUTPATIENT)
Dept: OPHTHALMOLOGY | Facility: CLINIC | Age: 65
End: 2020-12-10
Attending: OPHTHALMOLOGY
Payer: COMMERCIAL

## 2020-12-10 ENCOUNTER — TELEPHONE (OUTPATIENT)
Dept: TRANSPLANT | Facility: CLINIC | Age: 65
End: 2020-12-10

## 2020-12-10 DIAGNOSIS — E11.3513 TYPE 2 DIABETES MELLITUS WITH PROLIFERATIVE RETINOPATHY OF BOTH EYES AND MACULAR EDEMA, UNSPECIFIED WHETHER LONG TERM INSULIN USE (H): Primary | ICD-10-CM

## 2020-12-10 DIAGNOSIS — H43.12 VITREOUS HEMORRHAGE OF LEFT EYE (H): ICD-10-CM

## 2020-12-10 DIAGNOSIS — H33.42 TRACTION DETACHMENT OF LEFT RETINA: ICD-10-CM

## 2020-12-10 LAB
ALLOMAP SCORE (EXTERNAL): 32
NEGATIVE PREDICTIVE VALUE PERCENT (EXTERNAL): 98 %
POSITIVE PREDICTIVE VALUE PERCENT (EXTERNAL): 2.9 %

## 2020-12-10 PROCEDURE — 99207 FUNDUS AUTOFLUORESCENCE IMAGE (FAF) OD (RIGHT EYE): CPT | Performed by: OPHTHALMOLOGY

## 2020-12-10 PROCEDURE — 76512 OPH US DX B-SCAN: CPT | Performed by: OPHTHALMOLOGY

## 2020-12-10 PROCEDURE — 67028 INJECTION EYE DRUG: CPT | Mod: RT | Performed by: OPHTHALMOLOGY

## 2020-12-10 PROCEDURE — 250N000011 HC RX IP 250 OP 636: Performed by: OPHTHALMOLOGY

## 2020-12-10 PROCEDURE — 92134 CPTRZ OPH DX IMG PST SGM RTA: CPT | Performed by: OPHTHALMOLOGY

## 2020-12-10 PROCEDURE — G0463 HOSPITAL OUTPT CLINIC VISIT: HCPCS

## 2020-12-10 PROCEDURE — T1013 SIGN LANG/ORAL INTERPRETER: HCPCS | Mod: U4

## 2020-12-10 PROCEDURE — 92250 FUNDUS PHOTOGRAPHY W/I&R: CPT | Performed by: OPHTHALMOLOGY

## 2020-12-10 RX ADMIN — Medication 1.25 MG: at 10:39

## 2020-12-10 ASSESSMENT — CONF VISUAL FIELD
OS_INFERIOR_NASAL_RESTRICTION: 1
OS_INFERIOR_TEMPORAL_RESTRICTION: 1
METHOD: COUNTING FINGERS
OD_SUPERIOR_TEMPORAL_RESTRICTION: 3
OS_SUPERIOR_TEMPORAL_RESTRICTION: 1
OD_SUPERIOR_NASAL_RESTRICTION: 3
OS_SUPERIOR_NASAL_RESTRICTION: 1

## 2020-12-10 ASSESSMENT — TONOMETRY
IOP_METHOD: TONOPEN
OD_IOP_MMHG: 15
OS_IOP_MMHG: 8

## 2020-12-10 ASSESSMENT — SLIT LAMP EXAM - LIDS
COMMENTS: PTOSIS OD>OS
COMMENTS: PTOSIS OD>OS

## 2020-12-10 ASSESSMENT — VISUAL ACUITY
OD_SC: 20/250
OS_SC: HM BARELY
METHOD: SNELLEN - LINEAR

## 2020-12-10 ASSESSMENT — EXTERNAL EXAM - RIGHT EYE: OD_EXAM: NORMAL

## 2020-12-10 ASSESSMENT — EXTERNAL EXAM - LEFT EYE: OS_EXAM: NORMAL

## 2020-12-10 NOTE — Clinical Note
to schedule surgery left eye:  25 g Pars plana vitrectomy (PPV)/ membrane peel/ endolaser/ air fluid exchange/ possible Silicone Oil left eye   Surgical Time: 2.5 hours  - will need tissue blue and kenalog intra-op  Anesthesia peribulbar block   within 1-2 weeks

## 2020-12-10 NOTE — RESULT ENCOUNTER NOTE
Pt called with negative heart biopsy results.  Allomap 32- low risk for rejection.  Pt may also stop taking Protonix as he is no longer on prednisone and did not have reflux prior to transplant.

## 2020-12-10 NOTE — NURSING NOTE
Chief Complaints and History of Present Illnesses   Patient presents with     Retinal Evaluation     1 day ED follow up for bilateral retinal detachment & vit hemorrhage, left eye     Chief Complaint(s) and History of Present Illness(es)     Retinal Evaluation     Laterality: both eyes    Quality: blurred vision and spots in vision    Course: stable    Associated symptoms: Negative for flashes, floaters, eye pain, tearing and discharge    Pain scale: 0/10    Comments: 1 day ED follow up for bilateral retinal detachment & vit hemorrhage, left eye              Comments     Pt complains of vision BE being blurry - denies any changes since yesterday.  Complains of seeing black lines in vision RE  Denies any flashes, pain, irritation, discharge, and tearing.  Ocular meds: None    Anastacia Milner OT 7:46 AM December 10, 2020

## 2020-12-10 NOTE — DISCHARGE INSTRUCTIONS
Come to ophthalmology clinic tomorrow at 7 AM ([facility] 9th floor). Nothing to eat after midnight.    TODAY'S VISIT:  You were seen today for vision change  -   - If you had any labs or imaging/radiology tests performed today, you should also discuss these tests with your usual provider.     FOLLOW-UP:  Please make an appointment to follow up with:  - Your Primary Care Provider. If you do not have a PCP, please call the Primary Care Center (phone: [phone] for an appointment  - Eye Clinic (phone: [phone])     - Have your provider review the results from today's visit with you again to make sure no further follow-up or additional testing is needed based on those results.     RETURN TO THE EMERGENCY DEPARTMENT  Return to the Emergency Department at any time for any new or worsening symptoms or any concerns.

## 2020-12-10 NOTE — PROGRESS NOTES
Christelle Pettit RN Griffin, Peter Alberto [facility]             David Mahan,     I went ahead and had Lucian stop the Pantoprazole.  I must have forgot to stop it after he was done with the Pred.   Thanks!   AURORA Bourgeois    Previous Messages    ----- Message -----   From: Negrito Rai [facility]   Sent: 12/8/2020  12:42 PM CST   To: Nupur Rosado RN, Christelle Pettit RN     Hello,     You are both listed as Lucian's coordinators. I saw him for a med review, recommend holding Pantoprazole as he denies any recent GERD sx.     Let me know what the team decides, thanks!     Negrito Rai, PharmD   [facility] Pharmacist     Phone: [phone]

## 2020-12-10 NOTE — PROGRESS NOTES
CC: vitreous hemorrhage right eye and Retinal detachment Left eye   HPI: Lucian Henderson Sr. is a 65 year old male who presents for right eye vision loss over the past few days. he state has not been able to see out of his left eye for some months now.  no eye pain, no flashes and floaters.  Patient's was doing well with VA 20/40 both eyes, untill last retina follow up till he lost follow up because of  Heart surgery    Seen by Dr Ho 12-9-20 and found vitreous hemorrhage right eye and chronic retinal detachment left eye.  On 12-9-20 he went for a walk and started to see black streaks falling down into his right eye.    Past Ocular History:  PDR each eye s/p Panretinal laser photocoagulation (PRP) and multiple intravitreal injections right eye last 12/19  Hx Vit Heme right eye   PPV, membrane Peel, CE IOL gas/fluid exchange left eye 12/19 Dr. Horta.      Past Medical history significant for:  ICM and HFrEF s/p OHT 7/19/2020, DMII with PDR each eye , CKD stage 3, RUE DVT c/b HIT, and HTN. months ago he had pacer pocket infection and has since been removed.      OCULAR IMAGING 12/11/20   OCT 12/10/20   right eye: good foveal contour; few tiny drusen   left eye: good foveal contour; tiny cystic changes; few tiny drusen     ultrasound right eye: vitreous hemorrhage right eye; retina attached  Fundus picture consistent with exam    Assessment and plan     #Vitreous hemorrhage, right eye                  - VA 20/250 - consistent with vit heme obscuring macula                  - No obvious RT/RD on exam but hazy view                  - B-scan : retina attached  Plan: recommend avastin inj right eye   Might consider additional Panretinal laser photocoagulation (PRP) right eye whenever view allows vs vitrectomy if persistent vitreous hemorrhage.        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   - consider 25 g Pars plana vitrectomy (PPV)/ membrane peel/ endolaser/ air fluid exchange/ possible Silicone Oil left eye. After discussed with patient risks, benefits and alternatives of surgery, the patient agreed to proceed  I discussed the risks, benefits, and alternatives of retinal detachment surgery with the patient.  I explained that with surgery there was approximately a 70 percent chance of success and that the surgery might fail due to formation of fibrosis or other postoperative problems.  I explained that if the surgery failed, additional surgeries might be needed. I explained that retinal detachments cause vision loss and that even with a successful result from the surgery, the vision might not return to its prior level.  I explained that if there were subsequent re-detachments, even more vision might be lost.  I explained that with a gas bubble in the eye, a particular head position after surgery including face-down might be necessary for a few weeks after surgery.  I also explained that airplane travel or high altitudes would have to be avoided for up to three months after surgery.  I explained that an oil bubble could be placed instead, and that it would not require such strict positioning or travel restrictions. However, an oil bubble would require further surgeries to be removed.  I explained that with a scleral buckle around the eye, double vision may develop after surgery.  This double vision usually resolves, but it might require either special glasses, further surgeries, or could even be intractable and permanent.   I explained there was a small chance I might have to remove the intraocular lens during my surgery.   I explained that his visual prognosis is guarded because of  The nature of his macula off Retinal detachment  With severe proliferative vitreoretinopathy and  large retina tears. After explaining all risks, benefits and alternatives of the surgery including but not limited to endophthalmitis, retina detachment and cataract the patient elected to proceed.       # Cataract, RIGHT eye  # Pseudophakia, LEFT eye    PLAN:  - recommend Avastin injection right eye today for proliferative diabetic retinopathy and vitreous hemorrhage   - to schedule surgery left eye:  25 g Pars plana vitrectomy (PPV)/ membrane peel/ endolaser/ air fluid exchange/ possible Silicone Oil left eye   Surgical Time: 2.5 hours  - will need tissue blue and kenalog intra-op  Anesthesia peribulbar block   within 1-2 weeks    ~~~~~~~~~~~~~~~~~~~~~~~~~~~~~~~~~~   Complete documentation of historical and exam elements from today's encounter can be found in the full encounter summary report (not reduplicated in this progress note).  I personally obtained the chief complaint(s) and history of present illness.  I confirmed and edited as necessary the review of systems, past medical/surgical history, family history, social history, and examination findings as documented by others; and I examined the patient myself.  I personally reviewed the relevant tests, images, and reports as documented above.  I formulated and edited as necessary the assessment and plan and discussed the findings and management plan with the patient and family and No resident or fellow assisted with the procedures performed.  I performed the procedures myself.    Triny Bunch MD   of Ophthalmology.  Retina Service   Department of Ophthalmology and Visual Neurosciences   [facility]  Phone: [phone]   Fax: [phone]

## 2020-12-14 ENCOUNTER — APPOINTMENT (OUTPATIENT)
Dept: INTERPRETER SERVICES | Facility: CLINIC | Age: 65
End: 2020-12-14
Payer: COMMERCIAL

## 2020-12-14 DIAGNOSIS — Z11.59 ENCOUNTER FOR SCREENING FOR OTHER VIRAL DISEASES: Primary | ICD-10-CM

## 2020-12-14 PROBLEM — H33.42 TRACTION DETACHMENT OF LEFT RETINA: Status: ACTIVE | Noted: 2020-12-14

## 2020-12-15 ENCOUNTER — TELEPHONE (OUTPATIENT)
Dept: OPHTHALMOLOGY | Facility: CLINIC | Age: 65
End: 2020-12-15

## 2020-12-15 ENCOUNTER — APPOINTMENT (OUTPATIENT)
Dept: INTERPRETER SERVICES | Facility: CLINIC | Age: 65
End: 2020-12-15
Payer: COMMERCIAL

## 2020-12-15 NOTE — TELEPHONE ENCOUNTER
I called patient to schedule surgery with Dr. Triny Bunch, I left a voicemail with callback # [phone].

## 2020-12-18 ENCOUNTER — APPOINTMENT (OUTPATIENT)
Dept: INTERPRETER SERVICES | Facility: CLINIC | Age: 65
End: 2020-12-18
Payer: COMMERCIAL

## 2020-12-18 ENCOUNTER — TELEPHONE (OUTPATIENT)
Dept: OPHTHALMOLOGY | Facility: CLINIC | Age: 65
End: 2020-12-18

## 2020-12-18 DIAGNOSIS — Z11.59 ENCOUNTER FOR SCREENING FOR OTHER VIRAL DISEASES: ICD-10-CM

## 2020-12-18 PROCEDURE — U0003 INFECTIOUS AGENT DETECTION BY NUCLEIC ACID (DNA OR RNA); SEVERE ACUTE RESPIRATORY SYNDROME CORONAVIRUS 2 (SARS-COV-2) (CORONAVIRUS DISEASE [COVID-19]), AMPLIFIED PROBE TECHNIQUE, MAKING USE OF HIGH THROUGHPUT TECHNOLOGIES AS DESCRIBED BY CMS-2020-01-R: HCPCS | Performed by: OPHTHALMOLOGY

## 2020-12-18 NOTE — TELEPHONE ENCOUNTER
Patient is scheduled for surgery with Dr. Triny Bunch     Spoke with: Lucian     Date of Surgery: 20     Location:   [facility]:  [address] [place] [id number]     Informed patient they will need an adult : Yes     H&P was completed 20 and scanned into chart 20     COVID testin20 [facility]    Post Op scheduled on , and      Surgery packet was given in clinic     Additional comments: Advised RN will call 1 - 2 business days prior with arrival time and instructions.

## 2020-12-19 LAB
SARS-COV-2 RNA SPEC QL NAA+PROBE: NOT DETECTED
SPECIMEN SOURCE: NORMAL

## 2020-12-21 ENCOUNTER — ANESTHESIA (OUTPATIENT)
Dept: SURGERY | Facility: CLINIC | Age: 65
End: 2020-12-21
Payer: COMMERCIAL

## 2020-12-21 ENCOUNTER — ANESTHESIA EVENT (OUTPATIENT)
Dept: SURGERY | Facility: CLINIC | Age: 65
End: 2020-12-21
Payer: COMMERCIAL

## 2020-12-21 ENCOUNTER — HOSPITAL ENCOUNTER (OUTPATIENT)
Facility: CLINIC | Age: 65
Discharge: HOME | End: 2020-12-21
Attending: OPHTHALMOLOGY | Admitting: OPHTHALMOLOGY
Payer: COMMERCIAL

## 2020-12-21 VITALS
DIASTOLIC BLOOD PRESSURE: 62 MMHG | TEMPERATURE: 98.2 F | OXYGEN SATURATION: 93 % | BODY MASS INDEX: 30.12 KG/M2 | HEART RATE: 91 BPM | SYSTOLIC BLOOD PRESSURE: 129 MMHG | WEIGHT: 180.78 LBS | HEIGHT: 65 IN | RESPIRATION RATE: 14 BRPM

## 2020-12-21 DIAGNOSIS — H33.42 TRACTION DETACHMENT OF LEFT RETINA: ICD-10-CM

## 2020-12-21 DIAGNOSIS — Z48.810 AFTERCARE FOLLOWING SURGERY OF A SENSE ORGAN: Primary | ICD-10-CM

## 2020-12-21 LAB
GLUCOSE BLDC GLUCOMTR-MCNC: 111 MG/DL (ref 70–99)
GLUCOSE BLDC GLUCOMTR-MCNC: 143 MG/DL (ref 70–99)
GLUCOSE BLDC GLUCOMTR-MCNC: 168 MG/DL (ref 70–99)

## 2020-12-21 PROCEDURE — 67113 REPAIR RETINAL DETACH CPLX: CPT | Mod: 82 | Performed by: OPHTHALMOLOGY

## 2020-12-21 PROCEDURE — 360N000029 HC SURGERY LEVEL 4 EA 15 ADDTL MIN - UMMC: Performed by: OPHTHALMOLOGY

## 2020-12-21 PROCEDURE — 999N001017 HC STATISTIC GLUCOSE BY METER IP

## 2020-12-21 PROCEDURE — 67113 REPAIR RETINAL DETACH CPLX: CPT | Mod: LT | Performed by: OPHTHALMOLOGY

## 2020-12-21 PROCEDURE — 250N000009 HC RX 250: Performed by: NURSE ANESTHETIST, CERTIFIED REGISTERED

## 2020-12-21 PROCEDURE — 370N000002 HC ANESTHESIA TECHNICAL FEE, EACH ADDTL 15 MIN: Performed by: OPHTHALMOLOGY

## 2020-12-21 PROCEDURE — 360N000028 HC SURGERY LEVEL 4 1ST 30 MIN - UMMC: Performed by: OPHTHALMOLOGY

## 2020-12-21 PROCEDURE — 999N000139 HC STATISTIC PRE-PROCEDURE ASSESSMENT II: Performed by: OPHTHALMOLOGY

## 2020-12-21 PROCEDURE — 250N000003 HC SEVOFLURANE, EA 15 MIN: Performed by: OPHTHALMOLOGY

## 2020-12-21 PROCEDURE — 250N000011 HC RX IP 250 OP 636: Performed by: OPHTHALMOLOGY

## 2020-12-21 PROCEDURE — 250N000009 HC RX 250: Performed by: OPHTHALMOLOGY

## 2020-12-21 PROCEDURE — 370N000001 HC ANESTHESIA TECHNICAL FEE, 1ST 30 MIN: Performed by: OPHTHALMOLOGY

## 2020-12-21 PROCEDURE — 761N000007 HC RECOVERY PHASE 2 EACH 15 MINS: Performed by: OPHTHALMOLOGY

## 2020-12-21 PROCEDURE — 272N000001 HC OR GENERAL SUPPLY STERILE: Performed by: OPHTHALMOLOGY

## 2020-12-21 PROCEDURE — 250N000011 HC RX IP 250 OP 636: Performed by: NURSE ANESTHETIST, CERTIFIED REGISTERED

## 2020-12-21 PROCEDURE — 272N000002 HC OR SUPPLY OTHER OPNP: Performed by: OPHTHALMOLOGY

## 2020-12-21 PROCEDURE — 761N000003 HC RECOVERY PHASE 1 LEVEL 2 FIRST HR: Performed by: OPHTHALMOLOGY

## 2020-12-21 PROCEDURE — 258N000003 HC RX IP 258 OP 636: Performed by: NURSE ANESTHETIST, CERTIFIED REGISTERED

## 2020-12-21 PROCEDURE — 278N000051 HC OR IMPLANT GENERAL: Performed by: OPHTHALMOLOGY

## 2020-12-21 DEVICE — IMPLANTABLE DEVICE: Type: IMPLANTABLE DEVICE | Site: EYE | Status: FUNCTIONAL

## 2020-12-21 RX ORDER — NALOXONE HYDROCHLORIDE 0.4 MG/ML
0.4 INJECTION, SOLUTION INTRAMUSCULAR; INTRAVENOUS; SUBCUTANEOUS
Status: DISCONTINUED | OUTPATIENT
Start: 2020-12-21 | End: 2020-12-21 | Stop reason: HOSPADM

## 2020-12-21 RX ORDER — ATROPINE SULFATE 10 MG/ML
SOLUTION/ DROPS OPHTHALMIC PRN
Status: DISCONTINUED | OUTPATIENT
Start: 2020-12-21 | End: 2020-12-21 | Stop reason: HOSPADM

## 2020-12-21 RX ORDER — SODIUM CHLORIDE, SODIUM LACTATE, POTASSIUM CHLORIDE, CALCIUM CHLORIDE 600; 310; 30; 20 MG/100ML; MG/100ML; MG/100ML; MG/100ML
INJECTION, SOLUTION INTRAVENOUS CONTINUOUS PRN
Status: DISCONTINUED | OUTPATIENT
Start: 2020-12-21 | End: 2020-12-21

## 2020-12-21 RX ORDER — BALANCED SALT SOLUTION 6.4; .75; .48; .3; 3.9; 1.7 MG/ML; MG/ML; MG/ML; MG/ML; MG/ML; MG/ML
SOLUTION OPHTHALMIC PRN
Status: DISCONTINUED | OUTPATIENT
Start: 2020-12-21 | End: 2020-12-21 | Stop reason: HOSPADM

## 2020-12-21 RX ORDER — LIDOCAINE 40 MG/G
CREAM TOPICAL
Status: DISCONTINUED | OUTPATIENT
Start: 2020-12-21 | End: 2020-12-21 | Stop reason: HOSPADM

## 2020-12-21 RX ORDER — NALOXONE HYDROCHLORIDE 0.4 MG/ML
0.2 INJECTION, SOLUTION INTRAMUSCULAR; INTRAVENOUS; SUBCUTANEOUS
Status: DISCONTINUED | OUTPATIENT
Start: 2020-12-21 | End: 2020-12-21 | Stop reason: HOSPADM

## 2020-12-21 RX ORDER — FENTANYL CITRATE 50 UG/ML
INJECTION, SOLUTION INTRAMUSCULAR; INTRAVENOUS PRN
Status: DISCONTINUED | OUTPATIENT
Start: 2020-12-21 | End: 2020-12-21

## 2020-12-21 RX ORDER — PROPOFOL 10 MG/ML
INJECTION, EMULSION INTRAVENOUS PRN
Status: DISCONTINUED | OUTPATIENT
Start: 2020-12-21 | End: 2020-12-21

## 2020-12-21 RX ORDER — ONDANSETRON 2 MG/ML
4 INJECTION INTRAMUSCULAR; INTRAVENOUS EVERY 30 MIN PRN
Status: DISCONTINUED | OUTPATIENT
Start: 2020-12-21 | End: 2020-12-21 | Stop reason: HOSPADM

## 2020-12-21 RX ORDER — ONDANSETRON 4 MG/1
4 TABLET, ORALLY DISINTEGRATING ORAL EVERY 30 MIN PRN
Status: DISCONTINUED | OUTPATIENT
Start: 2020-12-21 | End: 2020-12-21 | Stop reason: HOSPADM

## 2020-12-21 RX ORDER — FENTANYL CITRATE 50 UG/ML
25-50 INJECTION, SOLUTION INTRAMUSCULAR; INTRAVENOUS
Status: DISCONTINUED | OUTPATIENT
Start: 2020-12-21 | End: 2020-12-21 | Stop reason: HOSPADM

## 2020-12-21 RX ORDER — HYDROMORPHONE HYDROCHLORIDE 1 MG/ML
.3-.5 INJECTION, SOLUTION INTRAMUSCULAR; INTRAVENOUS; SUBCUTANEOUS EVERY 5 MIN PRN
Status: DISCONTINUED | OUTPATIENT
Start: 2020-12-21 | End: 2020-12-21 | Stop reason: HOSPADM

## 2020-12-21 RX ORDER — EPHEDRINE SULFATE 50 MG/ML
INJECTION, SOLUTION INTRAMUSCULAR; INTRAVENOUS; SUBCUTANEOUS PRN
Status: DISCONTINUED | OUTPATIENT
Start: 2020-12-21 | End: 2020-12-21

## 2020-12-21 RX ORDER — HYDRALAZINE HYDROCHLORIDE 20 MG/ML
2.5-5 INJECTION INTRAMUSCULAR; INTRAVENOUS EVERY 10 MIN PRN
Status: DISCONTINUED | OUTPATIENT
Start: 2020-12-21 | End: 2020-12-21 | Stop reason: HOSPADM

## 2020-12-21 RX ORDER — SODIUM CHLORIDE, SODIUM LACTATE, POTASSIUM CHLORIDE, CALCIUM CHLORIDE 600; 310; 30; 20 MG/100ML; MG/100ML; MG/100ML; MG/100ML
INJECTION, SOLUTION INTRAVENOUS CONTINUOUS
Status: DISCONTINUED | OUTPATIENT
Start: 2020-12-21 | End: 2020-12-21 | Stop reason: HOSPADM

## 2020-12-21 RX ORDER — PROPOFOL 10 MG/ML
INJECTION, EMULSION INTRAVENOUS CONTINUOUS PRN
Status: DISCONTINUED | OUTPATIENT
Start: 2020-12-21 | End: 2020-12-21

## 2020-12-21 RX ORDER — NEOMYCIN SULFATE, POLYMYXIN B SULFATE, AND DEXAMETHASONE 3.5; 10000; 1 MG/G; [USP'U]/G; MG/G
OINTMENT OPHTHALMIC PRN
Status: DISCONTINUED | OUTPATIENT
Start: 2020-12-21 | End: 2020-12-21 | Stop reason: HOSPADM

## 2020-12-21 RX ORDER — CYCLOPENTOLAT/TROPIC/PHENYLEPH 1%-1%-2.5%
1 DROPS (EA) OPHTHALMIC (EYE)
Status: COMPLETED | OUTPATIENT
Start: 2020-12-21 | End: 2020-12-21

## 2020-12-21 RX ORDER — ONDANSETRON 2 MG/ML
INJECTION INTRAMUSCULAR; INTRAVENOUS PRN
Status: DISCONTINUED | OUTPATIENT
Start: 2020-12-21 | End: 2020-12-21

## 2020-12-21 RX ORDER — LIDOCAINE HYDROCHLORIDE 20 MG/ML
INJECTION, SOLUTION INFILTRATION; PERINEURAL PRN
Status: DISCONTINUED | OUTPATIENT
Start: 2020-12-21 | End: 2020-12-21

## 2020-12-21 RX ORDER — NEOMYCIN POLYMYXIN B SULFATES AND DEXAMETHASONE 3.5; 10000; 1 MG/ML; [USP'U]/ML; MG/ML
1 SUSPENSION/ DROPS OPHTHALMIC 4 TIMES DAILY
Qty: 5 ML | Refills: 0 | Status: SHIPPED | OUTPATIENT
Start: 2020-12-21 | End: 2023-01-20

## 2020-12-21 RX ADMIN — Medication 10 MG: at 13:02

## 2020-12-21 RX ADMIN — ROCURONIUM BROMIDE 50 MG: 10 INJECTION INTRAVENOUS at 13:02

## 2020-12-21 RX ADMIN — PROPOFOL 15 MCG/KG/MIN: 10 INJECTION, EMULSION INTRAVENOUS at 13:32

## 2020-12-21 RX ADMIN — ONDANSETRON 4 MG: 2 INJECTION INTRAMUSCULAR; INTRAVENOUS at 15:31

## 2020-12-21 RX ADMIN — SUGAMMADEX 160 MG: 100 INJECTION, SOLUTION INTRAVENOUS at 15:36

## 2020-12-21 RX ADMIN — SODIUM CHLORIDE, POTASSIUM CHLORIDE, SODIUM LACTATE AND CALCIUM CHLORIDE: 600; 310; 30; 20 INJECTION, SOLUTION INTRAVENOUS at 12:55

## 2020-12-21 RX ADMIN — PHENYLEPHRINE HYDROCHLORIDE 0.2 MCG/KG/MIN: 10 INJECTION INTRAVENOUS at 13:45

## 2020-12-21 RX ADMIN — FENTANYL CITRATE 100 MCG: 50 INJECTION, SOLUTION INTRAMUSCULAR; INTRAVENOUS at 13:02

## 2020-12-21 RX ADMIN — Medication 1 DROP: at 12:16

## 2020-12-21 RX ADMIN — Medication 1 DROP: at 12:26

## 2020-12-21 RX ADMIN — LIDOCAINE HYDROCHLORIDE 100 MG: 20 INJECTION, SOLUTION INFILTRATION; PERINEURAL at 13:02

## 2020-12-21 RX ADMIN — HYDROMORPHONE HYDROCHLORIDE 0.5 MG: 1 INJECTION, SOLUTION INTRAMUSCULAR; INTRAVENOUS; SUBCUTANEOUS at 15:39

## 2020-12-21 RX ADMIN — Medication 1 DROP: at 12:31

## 2020-12-21 RX ADMIN — MIDAZOLAM 1 MG: 1 INJECTION INTRAMUSCULAR; INTRAVENOUS at 12:55

## 2020-12-21 RX ADMIN — PROPOFOL 100 MG: 10 INJECTION, EMULSION INTRAVENOUS at 13:02

## 2020-12-21 ASSESSMENT — MIFFLIN-ST. JEOR: SCORE: 1521.88

## 2020-12-21 NOTE — ANESTHESIA PROCEDURE NOTES
"Airway   Date/Time: 12/21/2020 1:10 PM   Patient location during procedure: OR    Staff -   Anesthesiologist:  Kael Nicolas MD  CRNA: Christianne Briceno APRN CRNA  Performed By: anesthesiologist    Consent for Airway   Urgency: elective    Indications and Patient Condition  Indications for airway management: zander-procedural, airway protection and altered level of consciousness  Induction type:intravenousMask difficulty assessment: 2 - vent by mask + OA or adjuvant +/- NMBA    Final Airway Details  Final airway type: endotracheal airway  Successful airway:Oral  Endotracheal Airway Details   Cuffed: yes  Successful intubation technique: direct laryngoscopy  Grade View of Cords: 3 (with cricoid pressure)  Adjucts: stylet and bougie  Measured from: gums/teeth  Secured at (cm): 23  Secured with: silk tape and plastic tape  Bite block used: None    Post intubation assessment   Placement verified by: capnometry, equal breath sounds and chest rise   Number of attempts at approach: 2 (change from MAC 4 to a Perez 2)  Number of other approaches attempted: 3 or more (2 blades, dc stylet, use bougie to place 8.0 ET)  Secured with:silk tape and plastic tape  Ease of procedure: difficult  Dentition: UnchangedAdditional Comments  Easy to mask,  Intubation was difficult. jaw extension limited, VC anterior, view of glottis limited to base of cords. ET passed through cords with bougie by CESIA  Recommend \"D\" MAC next intubation          "

## 2020-12-21 NOTE — OR NURSING
Writing nurse spoke with Dr. Nicolas of anesthesia about whether patient needs a potassium drawn in pre op or not. Per Dr. Nicolas, no need for pre op potassium draw.

## 2020-12-21 NOTE — DISCHARGE INSTRUCTIONS
[facility]--[facility]    Discharge Instructions after Retina Eye Surgery  Dr. Ninfa Bunch  [phone]    General Care Information  Will I have pain?  Some discomfort is normal and expected following surgery. The first few days after surgery you may need to use prescription pain pills. Taking Tylenol (acetaminophen) regularly may help prevent pain.    Discomfort should gradually decrease and Tylenol should be sufficient to relieve pain. A foreign body sensation in the cornea of the eye is very common and caused by sutures placed at surgery. These sutures will go away in one to two weeks. If the pain worsens, you should call the doctor.    Do I need to wear an eye patch?  You do not need to wear an eye patch at home after the doctor has removed the patch on your first day after surgery. However, you may be more comfortable wearing a patch outside in the sun, when sleeping or napping, or in a carson, windy environment.    How much drainage should I have?  You may expect a moderate amount of drainage for a week. Gradually the drainage should decrease. The lids can be cleaned with a clean washcloth and gentle soap or diluted baby shampoo. Wipe the eyelids gently from the nose outward. Some blood in the tears is a normal finding.    Will there be swelling?  Some swelling is normal for about a week or two after which it will gradually decrease. Applying a cool compress, using a clean washcloth for 5 - 10 minutes several times a day may reduce the swelling and make you more comfortable. People may have some swelling of both eyes, especially if face down positioning is required. The white part of the eye may appear very red or bloody for a week or two. This may get worse a few days after surgery. Though the bright red appearance can look frightening, it is a normal finding early after surgery and will resolve in a few weeks.    Will I need to use eye drops?  You  will be using several different kinds of eye drops or ointment (salve) when you leave the hospital. The directions will be on each bottle or tube. The medication with the red top will keep your eye dilated and may make your eye more sensitive to light. Wearing sunglasses may help. The other medication is a combination antibiotic/steroid to prevent infection and promote healing.    Occasionally a third drop is used to control the pressure in your eye. A new bottle of artificial tears or lubricant ointment may be used along with your prescription eye drops after surgery. You will be using drops from four to eight weeks.   Bring all eye medications (drops. ointments, or pills) with you to each visit.     Always wash your hands before putting in the eye drops. You may wish to have someone else help you. Pull down on the lower lid and squeeze one drop from the bottle being careful not to touch the dropper to your eye or eyelid. One drop is sufficient, but another may be used if the first did not go into the eye. It is often easier to put in the drops if you are reclining or lying down.   Wait one to three minutes after the first drop before using the second drop to allow the medications to absorb into the eye.    How long will it take for my vision to improve?  Your vision should gradually improve, but it may take up to six months to regain your best vision.     Frequently, air or gas bubbles are injected into the eye at the time of surgery. This will blur your vision significantly at first. As the bubble becomes smaller it will cause a black line in your vision that moves as you move your head. As the bubble becomes smaller you may notice that it looks more like a bubble or that it will break up into several smaller bubbles. It will take from a few days to a few weeks for the bubble to dissolve and be replaced by clear fluid.    You may notice floaters or double vision after your surgery. These symptoms usually will  decrease with time. If the double vision is bothersome patching the eye may help.  If you notice a sudden worsening in your vision call your doctor ([phone]).    Are there any physical restrictions after surgery?  Patients that have a gas/air bubble placed in their eye will have a green medical alert band on their wrist.  This band should not be removed until the doctor removes it for you or gives you permission to remove it.         If an air or gas bubble was placed in the eye during surgery, you will be asked to spend most of your time (both awake and during the night) with your head in a specific position, frequently face down. As the eye heals and the bubble dissolves there will be less of a need for you to stay in that specific position. You should avoid sleeping on your back until the bubble has totally dissolved and you have been given permission from your surgeon.     You should not fly in an airplane or go to high altitudes in the mountains while there is a bubble in your eye.    If you should require any other surgery, under general anesthesia, while you still have an air bubble in your eye, have your surgeon or anesthetist contact us prior to your surgery. Some anesthetic agents can make the bubble expand and seriously damage your eye.      Heavy lifting (greater than 50 pounds), swimming and contact sports should be avoided for about 3 to 4 weeks after surgery.    You may resume your usual sexual activities about one week after surgery.    When may I return to work or my normal activities?  Depending on the type or work, you may return to work within a few days. If your work involves physical activity or driving, you will need to restrict your activities and remain home longer.    You may watch television, look at magazines, or work puzzles. Reading may be uncomfortable for several days, but using the eyes will not cause any damage.    You may go outside as usual. If conditions are windy or carson,  wear an eye pad to avoid getting dust or dirt in the eye.    Can I travel?  You cannot fly in an airplane or drive into the mountains as long as the air or gas bubble remains in your eye.    Are there any driving restrictions?  Someone will need to drive you home from the hospital. Generally driving can be resumed in several days if you have good vision in your other eye. If you do not feel comfortable driving, do not drive! Your depth perception will be decreased so you will want to try driving during the day in light traffic until you feel comfortable driving. You should restrict your driving while you are taking prescription pain pills as they also can affect your judgment.    When can I shower and wash my hair?  You may shower or bathe when you get home, but avoid getting water in your eye. You may want someone to help you shampoo your hair at first.  You may shave, brush your teeth, or comb your hair. Do not use make-up, mascara, or creams/ lotions around your eye for several weeks.    When will I see the doctor again?  Generally, you will be seen the first day after surgery and again 1-2 weeks later.     If you have not received a return appointment before leaving the hospital, you should call our office during the business hours to arrange an appointment. If you will be seeing your local doctor instead of us, you will need to call that office to set up an appointment.    If you have a green armband on, your physician will instruct you as to how long you will have to wear it at your post-operative follow-up appointment.    How do I reach a doctor if I have concerns?  One of our doctors is available by calling [facility] [phone]. Please try to call for routine questions and prescription refills during business hours.    You should call your doctor if:    You notice a sudden decrease in your vision.    Have severe pain or pain increases rather than subsiding.    You notice a new  black curtain over your eye that is not the gas bubble. If you have any of these symptoms, you may need to be examined.    Call for any problems or questions:  [facility]   [phone]    There is always someone on call, even on weekends  Same-Day Surgery   Adult Discharge Orders & Instructions     For 24 hours after surgery:  1. Get plenty of rest.  A responsible adult must stay with you for at least 24 hours after you leave the hospital.   2. Pain medication can slow your reflexes. Do not drive or use heavy equipment.  If you have weakness or tingling, don't drive or use heavy equipment until this feeling goes away.  3. Mixing alcohol and pain medication can cause dizziness and slow your breathing. It can even be fatal. Do not drink alcohol while taking pain medication.  4. Avoid strenuous or risky activities.  Ask for help when climbing stairs.   5. You may feel lightheaded.  If so, sit for a few minutes before standing.  Have someone help you get up.   6. If you have nausea (feel sick to your stomach), drink only clear liquids such as apple juice, ginger ale, broth or 7-Up.  Rest may also help.  Be sure to drink enough fluids.  Move to a regular diet as you feel able. Take pain medications with a small amount of solid food, such as toast or crackers, to avoid nausea.   7. A slight fever is normal. Call the doctor if your fever is over 100 F (37.7 C) (taken under the tongue) or lasts longer than 24 hours.  8. You may have a dry mouth, muscle aches, trouble sleeping or a sore throat.  These symptoms should go away after 24 hours.  9. Do not make important or legal decisions.   Pain Management:      1. Take pain medication (if prescribed) for pain as directed by your physician.        2. WARNING: If the pain medication you have been prescribed contains Tylenol  (acetaminophen), DO NOT take additional doses of Tylenol (acetaminophen).     Call your doctor for any of the followin.   Signs of infection (fever, growing tenderness at the surgery site, severe pain, a large amount of drainage or bleeding, foul-smelling drainage, redness, swelling).    2.  It has been over 8 to 10 hours since surgery and you are still not able to urinate (pee).    3.  Headache for over 24 hours.    4.  Numbness, tingling or weakness the day after surgery (if you had spinal anesthesia).  To contact a doctor, call _____________________________________ or:      [phone] and ask for the Resident On Call for:          __________________________________________ (answered 24 hours a day)      Emergency Department:  [place] [facility]: [phone]  [place] [facility]: [phone]               Rev. 10/2014

## 2020-12-21 NOTE — OP NOTE
Surgeon: Dr Triny Bunch  Assistant surgeon: Dr Darryl Kang  PREOPERATIVE DIAGNOSES:   1. 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   ESTIMATED BLOOD LOSS: Scant.   INDICATIONS: Lucian Henderson Sr. is a 65 year old  patient with a history of tractional retinal detachment repair and loss to follow up due to other medical condition who presented with a tractional and rhegmatogenous retinal detachment, here for surgical repair.  DESCRIPTION OF PROCEDURE: The patient was taken to the operating room where sedation was administered by the Anesthesia Department and a retrobulbar block consisting of a 1:1 mixture of 2% lidocaine and 0.75% Marcaine with epinephrine and Wydase was administered.The patient's eye was prepped and draped in the usual sterile surgical fashion for ophthalmic surgery, including instillation of 1 drop of 5% povidone iodine. A sterile drape was placed over the face and body and a lid speculum was inserted. The microscope was brought into the operative field.   A 27-gauge trocar was placed inferotemporally 3.5 mm posterior to the limbus. the infusion cannula was connected and its intravitreal location was verified by direct visualization. Two other 27-gauge trocars were placed superior nasal and superior temporally. An additional trochar was placed inferiorly for the chandelier. The Biom was used to assist with the view during the vitrectomy.   The vitrector handpiece and endoilluminator were placed in the eye. Upon entering the eye it was noted the patient had a tractional retinal detachment. Additional vitrectomy was performed with kenalog. Membrane peel was attempted performed with vitreous forceps.  It became apparent that the patient had a funnel retina detachment with bare RPE temporally. The retina was also very ischemic, the vessels very attenuated and optic nerve pallor. The nasal retina appeared attached. The corneal epithelium on central axis had to be debrided. Multiple techniques were attempted to try to separate the retina-retina adhesion from the longstanding funnel RD including trying to open the funnel with helon. It was not possible to open completely the funnel RD. Peripheral retintitomy was done to flatten the retina using perfluoroctane liquid (PFO).  Next endolaser was applied posterior to the edges of the retinectomy and 360 degrees posterior to edges of Chorioretinal  scars. We performed a PFO-silicone oil exchange with . Next, the trocars were removed and the sclerotomies were closed with 6-0 plain gut suture.  Subconjunctival injections of Ancef and dexamethasone were administered. The lid speculum was removed. The eye was cleaned with wet and dry gauze. Maxitrol ointment was applied to the eye. A Silveira shield were placed over the left eye. The patient was discharged to the postoperative care unit in stable condition, having tolerated the procedure well.     The surgery was assisted by Dr.Tahsin Kang. Due to the delicate and complex nature of this surgery, Dr.Tahsin Kang was required. Dr.Tahsin Kang assisted with vitrectomy. I was present for the entire surgery.

## 2020-12-21 NOTE — ANESTHESIA CARE TRANSFER NOTE
Patient: Lucian Henderson Sr.    Procedure(s):  Left Eye 27-Gauge Pars Plana Vitrectomy, Membrane Peel, Endolaser, retinectomy, Silicone Oil 1000    Diagnosis: Traction detachment of left retina [H33.42]  Diagnosis Additional Information: No value filed.    Anesthesia Type:   General     Note:  Airway :Face Mask  Patient transferred to:PACU  Handoff Report: Identifed the Patient, Identified the Reponsible Provider, Reviewed the pertinent medical history, Discussed the surgical course, Reviewed Intra-OP anesthesia mangement and issues during anesthesia, Set expectations for post-procedure period and Allowed opportunity for questions and acknowledgement of understanding      Vitals: (Last set prior to Anesthesia Care Transfer)    CRNA VITALS  12/21/2020 1527 - 12/21/2020 1605      12/21/2020             Pulse:  95    Ht Rate:  93    SpO2:  97 %    Resp Rate (observed):  (!) 2                Electronically Signed By: ELIJAH Patel CRNA  December 21, 2020  4:05 PM

## 2020-12-21 NOTE — ANESTHESIA PREPROCEDURE EVALUATION
Anesthesia Pre-Procedure Evaluation    Patient: Lucian Henderson .   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]   Procedure(s):  VITRECTOMY, PARS PLANA APPROACH, USING 25-GAUGE INSTRUMENTS, 25 g Pars plana vitrectomy (PPV)/ membrane peel/ endolaser/ air fluid exchange/ possible Silicone Oil left eye     LABS:  CBC:   Lab Results   Component Value Date    WBC 4.1 2020    WBC 4.6 2020    HGB 10.7 (L) 2020    HGB 11.9 (L) 2020    HCT 34.2 (L) 2020    HCT 38.4 (L) 2020     2020     2020     BMP:   Lab Results   Component Value Date     2020     2020    POTASSIUM 4.4 2020    POTASSIUM 4.6 2020    CHLORIDE 106 2020    CHLORIDE 108 2020    CO2 26 2020    CO2 23 2020    BUN 37 (H) 2020    BUN 34 (H) 2020    CR 1.92 (H) 2020    CR 1.80 (H) 2020     (H) 2020     (H) 2020     COAGS:   Lab Results   Component Value Date    PTT 31 2020    INR 1.02 11/10/2020    FIBR 295 2020     POC:   Lab Results   Component Value Date     (H) 11/10/2020     OTHER:   Lab Results   Component Value Date    PH 7.47 (H) 2020    LACT 1.3 2020    A1C 5.9 (H) 2020    BRYANNA 8.6 2020    PHOS 3.5 2020    MAG 1.5 (L) 2020    ALBUMIN 3.3 (L) 2020    PROTTOTAL 6.1 (L) 2020    ALT 12 2020    AST 8 2020    ALKPHOS 106 2020    BILITOTAL 0.3 2020    AMYLASE 36 2020    TSH 0.80 2020    CRP 16.0 (H) 2020        Preop Vitals    BP Readings from Last 3 Encounters:   20 (!) 147/77   20 128/84   20 128/71    Pulse Readings from Last 3 Encounters:   20 90   20 103   20 108      Resp Readings from Last 3 Encounters:   20 18   20 16  "  11/10/20 18    SpO2 Readings from Last 3 Encounters:   12/09/20 97%   12/07/20 98%   12/07/20 99%      Temp Readings from Last 1 Encounters:   12/09/20 36.9  C (98.5  F) (Oral)    Ht Readings from Last 1 Encounters:   12/09/20 1.651 m (5' 5\")      Wt Readings from Last 1 Encounters:   12/09/20 80.7 kg (178 lb)    Estimated body mass index is 29.62 kg/m  as calculated from the following:    Height as of 12/9/20: 1.651 m (5' 5\").    Weight as of 12/9/20: 80.7 kg (178 lb).     LDA:  Supraglottic Airway Nasopharyngeal 32 Fr (Active)   Number of days:        Negative Pressure Wound Therapy Left;Upper (Active)   Wound Type Surgical 10/05/20 0825   Unit Type Wound vac 10/05/20 0825   Cycle Continuous 10/05/20 0825   Target Pressure (mmHg) 125 10/05/20 0825   (Retired) Dressing Status Clean, dry, intact 10/05/20 0825   Drainage Color/Charcteristics Sanguinous 10/04/20 2052   Cannister changed? No 10/05/20 0825   Output (ml) 0 ml 10/05/20 0825   Number of days: 80        Past Medical History:   Diagnosis Date     CAD (coronary artery disease)      CHF (congestive heart failure) (H)      CKD (chronic kidney disease), stage III      Cortical cataract of both eyes      Diabetes (H)      Hyperlipidemia      Hypertension      Ischemic cardiomyopathy      Obesity      CHANTEL (obstructive sleep apnea)     occas cpap     Osteoarthritis       Past Surgical History:   Procedure Laterality Date     C CABG, ARTERY-VEIN, THREE  02/2008     CATARACT IOL, RT/LT       COLONOSCOPY N/A 8/7/2019    Procedure: COLONOSCOPY, WITH POLYPECTOMY AND BIOPSY;  Surgeon: Chauncey Morataya MD;  Location:  GI     CV ANGIOGRAM CORONARY GRAFT N/A 7/2/2020    Procedure: Angiogram Coronary Graft;  Surgeon: Alex Lantigua MD;  Location:  HEART CARDIAC CATH LAB     CV CORONARY ANGIOGRAM N/A 7/2/2020    Procedure: CV CORONARY ANGIOGRAM;  Surgeon: Alex Lantigua MD;  Location:  HEART CARDIAC CATH LAB     CV HEART BIOPSY N/A 7/27/2020    " Procedure: Heart Biopsy;  Surgeon: Mario Burr MD;  Location: [facility] CARD[facility]AC CATH LAB     CV HEART BIOPSY N/A 8/3/2020    Procedure: CV HEART BIOPSY;  Surgeon: Alex Lantigua MD;  Location: [facility] HEART CARDIAC CATH LAB     CV HEART BIOPSY N/A 8/10/2020    Procedure: CV HEART BIOPSY;  Surgeon: Mario Burr MD;  Location: [facility] HEART CARDIAC CATH LAB     CV HEART BIOPSY N/A 8/17/2020    Procedure: CV HEART BIOPSY;  Surgeon: Raimundo Hudson MD;  Location: [facility] HE[facility] CARDIAC CATH LAB     CV HEART BIOPSY N/A 8/31/2020    Procedure: CV HEART BIOPSY;  Surgeon: Moises Santos MD;  Location: [facility] CARD[facility]AC CATH LAB     CV HEART BIOPSY N/A 9/14/2020    Procedure: CV HEART BIOPSY;  Surgeon: Raimundo Hudson MD;  Location:  HEART CARDIAC CATH LAB     CV HEART BIOPSY N/A 9/28/2020    Procedure: CV HEART BIOPSY;  Surgeon: Raimundo Hudson MD;  Location: [facility] CARD[facility] CATH LAB     CV HEART BIOPSY N/A 10/12/2020    Procedure: CV HEART BIOPSY;  Surgeon: John Stuart MD;  Location: [facility]ARD[facility] CATH LAB     CV HEART BIOPSY N/A 11/10/2020    Procedure: CV HEART BIOPSY;  Surgeon: John Stuart MD;  Location:  HEART CARDIAC CATH LAB     CV HEART BIOPSY N/A 12/7/2020    Procedure: CV HEART BIOPSY;  Surgeon: Raimundo Hudson MD;  Location:  HEART CARDIAC CATH LAB     CV INTRA-AORTIC BALLOON PUMP INSERTION N/A 7/14/2020    Procedure: RHC WITH LEAVE IN SWAN/IABP;  Surgeon: Sonny Saleh MD;  Location:  HEART CARDIAC CATH LAB     CV RIGHT HEART CATH N/A 3/25/2019    Procedure: CV RIGHT HEART CATH;  Surgeon: Moises Santos MD;  Location:  HEART CARDIAC CATH LAB     CV RIGHT HEART CATH N/A 7/10/2019    Procedure: CV RIGHT HEART CATH;  Surgeon: Jak Mccabe MD;  Location:  HEART CARDIAC CATH LAB     CV RIGHT HEART CATH N/A 7/8/2020    Procedure: Right Heart Cath with Leave In Sacul already has ICU load;  Surgeon: Jak Mccabe,  MD;  Location:  HEART CARDIAC CATH LAB     CV RIGHT HEART CATH N/A 7/14/2020    Procedure: CV RIGHT HEART CATH;  Surgeon: Sonny Saleh MD;  Location:  HEART CARDIAC CATH LAB     CV RIGHT HEART CATH N/A 7/2/2020    Procedure: CV RIGHT HEART CATH;  Surgeon: Alex Lantigua MD;  Location:  HEART CARDIAC CATH LAB     CV RIGHT HEART CATH N/A 7/27/2020    Procedure: Right Heart Cath;  Surgeon: Mario Burr MD;  Location:  HEART CARDIAC CATH LAB     CV RIGHT HEART CATH N/A 8/3/2020    Procedure: Right Heart Cath;  Surgeon: Alex Lantigua MD;  Location:  HEART CARDIAC CATH LAB     CV RIGHT HEART CATH N/A 8/10/2020    Procedure: CV RIGHT HEART CATH;  Surgeon: Mario Burr MD;  Location:  HEART CARDIAC CATH LAB     CV RIGHT HEART CATH N/A 8/17/2020    Procedure: CV RIGHT HEART CATH;  Surgeon: Raimundo Hudson MD;  Location:  HEART CARDIAC CATH LAB     CV RIGHT HEART CATH N/A 8/31/2020    Procedure: CV RIGHT HEART CATH;  Surgeon: Moises Santos MD;  Location:  HEART CARDIAC CATH LAB     CV RIGHT HEART CATH N/A 9/14/2020    Procedure: CV RIGHT HEART CATH;  Surgeon: Raimundo Hudson MD;  Location:  HEART CARDIAC CATH LAB     CV RIGHT HEART CATH N/A 9/28/2020    Procedure: CV RIGHT HEART CATH;  Surgeon: Raimundo Hudson MD;  Location:  HEART CARDIAC CATH LAB     CV RIGHT HEART CATH N/A 10/12/2020    Procedure: CV RIGHT HEART CATH;  Surgeon: John Stuart MD;  Location:  HEART CARDIAC CATH LAB     CV RIGHT HEART CATH N/A 11/10/2020    Procedure: CV RIGHT HEART CATH;  Surgeon: John Stuart MD;  Location:  HEART CARDIAC CATH LAB     CV RIGHT HEART CATH N/A 12/7/2020    Procedure: CV RIGHT HEART CATH;  Surgeon: Raimundo Hudson MD;  Location:  HEART CARDIAC CATH LAB     EXTRACTION(S) DENTAL Left 7/13/2020    Procedure: EXTRACTION, TOOTH #11, 12, 13, 15, and 29;  Surgeon: Monica Chao DDS;  Location:  OR      IMPLANT AUTOMATIC IMPLANTABLE CARDIOVERTER DEFIBRILLATOR       INCISION AND DRAINAGE STERNUM W/ IRRIGATION SYSTEM, COMBINED N/A 9/23/2020    Procedure: INCISION AND DRAINAGE, LEFT SUPRACLAVICULAR WOUND INFECTION AND ABDOMENN WOUND.;  Surgeon: Ran Huertas MD;  [facility]     INSERT INTRAAORTIC BALLOON PUMP N/A 7/16/2020    Procedure: Subclavian Intra-Aortic Balloon Pump Placement;  Surgeon: Allan Sparrow MD;  [facility]     IRRIGATION AND DEBRIDEMENT CHEST WASHOUT, COMBINED N/A 9/28/2020    Procedure: IRRIGATION AND DEBRIDEMENT OF LEFT UPPER CHEST WOUND.;  Surgeon: Ran Huertas MD;  [facility]     PHACOEMULSIFICATION CLEAR CORNEA WITH STANDARD IOL, VITRECTOMY PARSPLANA 25 GAGUE, COMBINED Left 12/10/2019    Procedure: PHACOEMULSIFICATION, CATARACT, CLEAR CORNEAL INCISION APPROACH, W STD INTRAOCULAR LENS IMPLANT INSERT + VITRECTOMY BY PARS PLANA  USING 25-GAUGE INSTRUMENTS. ENDOLASER, INFUSION OF 20% SF6 GAS;  Surgeon: Triny Bunch MD;  [facility]     PICC DOUBLE LUMEN PLACEMENT Left 07/28/2020    5Fr - 42cm, Basilic vein, mid SVC     PICC INSERTION Right 07/11/2020    basilic 44 cm total      TRANSPLANT HEART RECIPIENT N/A 7/19/2020    Procedure: REDO MEDIAN STERNOTOMY, TRANSPLANT, ORTHOTOPIC HEART, RECIPIENT, ON PUMP OXYGENATOR, REMOVAL OF CARDIAC DEFIBRILLATOR AND LEAD;  Surgeon: Griselli, Massimo, MD;  [facility]      Allergies   Allergen Reactions     Heparin Heparin Induced Thrombocytopenia     HIT screen sent 7/18/2020. CORRINE confirmed positive        Anesthesia Evaluation     . Pt has had prior anesthetic. Type: General    No history of anesthetic complications          ROS/MED HX    ENT/Pulmonary:     (+)sleep apnea, , . .    Neurologic:  - neg neurologic ROS     Cardiovascular: Comment: Post heart transplant 7/2020, early graft failure now recovered     Heart cath 12-07-20  RA --/--/3 mmHg  RV 26/2 mmHg  PA 26/10/18 mmHg  CWP --/--/8 mmHg  CO (Jacy) 5.76 L/min  CI  (Jacy) 3.08 L/min/m2  CO (TD) 5.23 L/min  CI (TD) 2.79 L/min/m2    PA Sat 68.6%   /72/104 mmHg   SVR 1403 dynes  PVR 1.74 PERRY     Right sided filling pressures are normal. Left sided filling pressures are normal. Normal PA pressures. Normal cardiac output level.     ECHO 10-12-20  Interpretation Summary  Patient s/p heart transplant on 07/19/2020. Patient tachycardic during  examination at approximately 100 bpm.     Left ventricular function, chamber size, wall motion, and wall thickness are  normal.The EF is 55-60%.  Global right ventricular function is normal. The right ventricle is normal  size.  Trace tricuspid insufficiency is present.  PA systolic pressure could not be assessed.  No pericardial effusion.  This study was compared with the study from 9/23/20. There has been no change.    (+) hypertension----. : . . . :. . Previous cardiac testing       METS/Exercise Tolerance:     Hematologic: Comments: Hx of HIT    (+) Anemia, -      Musculoskeletal:         GI/Hepatic:         Renal/Genitourinary:     (+) chronic renal disease, type: CRI,       Endo:     (+) type II DM .      Psychiatric:  - neg psychiatric ROS       Infectious Disease:         Malignancy:      - no malignancy   Other:                         PHYSICAL EXAM:   Mental Status/Neuro: A/A/O   Airway: Facies: Feasible  Mallampati: III  Mouth/Opening: Full  TM distance: > 6 cm  Neck ROM: Full   Respiratory: Auscultation: CTAB     Resp. Rate: Normal     Resp. Effort: Normal      CV: Rhythm: Regular  Rate: Age appropriate  Heart: Normal Sounds  Edema: None   Comments:      Dental: Normal Dentition                Assessment:   ASA SCORE: 3    H&P: History and physical reviewed and following examination; no interval change.    NPO Status: NPO Appropriate     Plan:   Anes. Type:  General   Pre-Medication: None   Induction:  IV (Standard)   Airway: Oral   Access/Monitoring: PIV   Maintenance: Balanced     Postop Plan:   Postop Pain: Opioids  Postop  Sedation/Airway: Not planned  Disposition: Outpatient     PONV Management:   Adult Risk Factors:, Postop Opioids   Prevention: Ondansetron, Dexamethasone     CONSENT: Direct conversation   Plan and risks discussed with: Patient   Blood Products: Consent Deferred (Minimal Blood Loss)                   Kael Nicolas MD

## 2020-12-22 ENCOUNTER — APPOINTMENT (OUTPATIENT)
Dept: INTERPRETER SERVICES | Facility: CLINIC | Age: 65
End: 2020-12-22
Payer: COMMERCIAL

## 2020-12-22 ENCOUNTER — OFFICE VISIT (OUTPATIENT)
Dept: OPHTHALMOLOGY | Facility: CLINIC | Age: 65
End: 2020-12-22
Attending: OPHTHALMOLOGY
Payer: COMMERCIAL

## 2020-12-22 DIAGNOSIS — Z98.890 POSTOPERATIVE EYE STATE: Primary | ICD-10-CM

## 2020-12-22 PROCEDURE — T1013 SIGN LANG/ORAL INTERPRETER: HCPCS | Mod: U4

## 2020-12-22 PROCEDURE — G0463 HOSPITAL OUTPT CLINIC VISIT: HCPCS

## 2020-12-22 PROCEDURE — 99207 PR NO CHARGE LOS: CPT | Performed by: OPHTHALMOLOGY

## 2020-12-22 ASSESSMENT — TONOMETRY
OD_IOP_MMHG: 19
OS_IOP_MMHG: 24
IOP_METHOD: ICARE

## 2020-12-22 ASSESSMENT — VISUAL ACUITY
METHOD: SNELLEN - LINEAR
OD_PH_SC: 20/60
OD_PH_SC+: -1
OD_SC: 20/60
OS_SC: HM

## 2020-12-22 NOTE — ANESTHESIA POSTPROCEDURE EVALUATION
Anesthesia POST Procedure Evaluation    Patient: Lucian Henderson .   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     Anesthesia Type: General       Disposition: Outpatient   Postop Pain Control: Uneventful            Sign Out: Well controlled pain   PONV: No   Neuro/Psych: Uneventful            Sign Out: Acceptable/Baseline neuro status   Airway/Respiratory: Uneventful            Sign Out: Acceptable/Baseline resp. status   CV/Hemodynamics: Uneventful            Sign Out: Acceptable CV status   Other NRE: NONE   DID A NON-ROUTINE EVENT OCCUR? No         Last Anesthesia Record Vitals:  CRNA VITALS  2020 1527 - 2020 1627      2020             Pulse:  95    Ht Rate:  93    SpO2:  97 %    Resp Rate (observed):  (!) 2          Last PACU Vitals:  Vitals Value Taken Time   /62 20 1630   Temp 36.1  C (96.9  F) 20 1630   Pulse 90 20 1635   Resp 14 20 1636   SpO2 94 % 20 1637   Temp src     NIBP     Pulse     SpO2     Resp     Temp     Ht Rate     Temp 2     Vitals shown include unvalidated device data.      Electronically Signed By: Torri Santiago MD, 2020, 12:54 PM

## 2020-12-22 NOTE — NURSING NOTE
Chief Complaints and History of Present Illnesses   Patient presents with     Follow Up     1 day post LE vitrectomy        Chief Complaint(s) and History of Present Illness(es)     Follow Up     Laterality: left eye    Comments: 1 day post LE vitrectomy                 Comments     Patient state that since surgery yesterday the left eye is still a little painful. Has continued to have the patch on since surgery yesterday.   Does have the eye drops and ointment with him today but has not began  Used [place] interpreting service today.   Patient presents with:  Follow Up: 1 day post LE vitrectomy     Chief Complaint(s) and History of Present Illness(es)     Follow Up     Laterality: left eye    Comments: 1 day post LE vitrectomy                 Comments     Patient state that since surgery yesterday the left eye is still a little   painful. Has continued to have the patch on since surgery yesterday.     Used [place] interpreting service today.     .                Meagan Leon, COT COT 11:25 AM December 22, 2020

## 2020-12-22 NOTE — PROGRESS NOTES
Postoperative day 1 status post PPV/MP/retinectomy 12/21/20. 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 Denies eye pain.    Slept well  Retina attached  Doing well    Plan:  No aviation  No heavy lifting   Silveira shield at all times  Retina detachment and endophthalmitis precautions were discussed with the patient and was asked to return if any of the those occur    Medications to operative eye  Maxitrol (pink top) four times a day    Maxitrol oint at bedtime  Atropine (red top) once a day     Follow up in one week  ~~~~~~~~~~~~~~~~~~~~~~~~~~~~~~~~~~   Complete documentation of historical and exam elements from today's encounter can be found in the full encounter summary report (not reduplicated in this progress note).  I personally obtained the chief complaint(s) and history of present illness.  I confirmed and edited as necessary the review of systems, past medical/surgical history, family history, social history, and examination findings as documented by others; and I examined the patient myself.  I personally reviewed the relevant tests, images, and reports as documented above.  I formulated and edited as necessary the assessment and plan and discussed the findings and management plan with the patient and family    Darryl Kang MD  Vitreoretinal Surgery Fellow  [facility]

## 2020-12-22 NOTE — OR NURSING
Pt ready for discharge, pt real shaky.  States he is ok, no other complaints.  Bedside glucose checked = 143.

## 2020-12-22 NOTE — PATIENT INSTRUCTIONS
Medications to operative eye  Maxitrol (pink top) four times a day    Maxitrol oint at bedtime  Atropine (red top) once a day  No heavy lifting x 3 weeks  Wear glasses or shield at all times x 3 weeks

## 2020-12-29 ENCOUNTER — PRE VISIT (OUTPATIENT)
Dept: TRANSPLANT | Facility: CLINIC | Age: 65
End: 2020-12-29

## 2020-12-29 DIAGNOSIS — Z13.220 LIPID SCREENING: ICD-10-CM

## 2020-12-29 DIAGNOSIS — Z94.1 HEART TRANSPLANT, ORTHOTOPIC, STATUS (H): Primary | ICD-10-CM

## 2020-12-29 RX ORDER — LIDOCAINE 40 MG/G
CREAM TOPICAL
Status: CANCELLED | OUTPATIENT
Start: 2020-12-29

## 2020-12-30 ENCOUNTER — OFFICE VISIT (OUTPATIENT)
Dept: OPHTHALMOLOGY | Facility: CLINIC | Age: 65
End: 2020-12-30
Attending: OPHTHALMOLOGY
Payer: COMMERCIAL

## 2020-12-30 DIAGNOSIS — E11.3513 TYPE 2 DIABETES MELLITUS WITH PROLIFERATIVE RETINOPATHY OF BOTH EYES AND MACULAR EDEMA, UNSPECIFIED WHETHER LONG TERM INSULIN USE (H): Primary | ICD-10-CM

## 2020-12-30 PROCEDURE — G0463 HOSPITAL OUTPT CLINIC VISIT: HCPCS

## 2020-12-30 PROCEDURE — 99024 POSTOP FOLLOW-UP VISIT: CPT | Performed by: OPHTHALMOLOGY

## 2020-12-30 RX ORDER — ATROPINE SULFATE 10 MG/ML
1 SOLUTION/ DROPS OPHTHALMIC DAILY
COMMUNITY
End: 2022-09-15

## 2020-12-30 ASSESSMENT — EXTERNAL EXAM - RIGHT EYE
OD_EXAM: NORMAL
OD_EXAM: NORMAL

## 2020-12-30 ASSESSMENT — VISUAL ACUITY
METHOD: SNELLEN - LINEAR
OS_SC: CF @ 3'
OD_SC: 20/80
OS_SC+: SEARCHING

## 2020-12-30 ASSESSMENT — EXTERNAL EXAM - LEFT EYE
OS_EXAM: NORMAL
OS_EXAM: NORMAL

## 2020-12-30 ASSESSMENT — SLIT LAMP EXAM - LIDS
COMMENTS: PTOSIS OD>OS

## 2020-12-30 ASSESSMENT — TONOMETRY
OS_IOP_MMHG: 24
IOP_METHOD: TONOPEN
OD_IOP_MMHG: 18
IOP_METHOD: TONOPEN
OD_IOP_MMHG: X
OS_IOP_MMHG: 25

## 2020-12-30 NOTE — PATIENT INSTRUCTIONS
Left eye  Stop atropine (red top)  Taper maxitrol drop 3 times for 1 week, then 2 times for one week, then once for one week then stop     Keep shield on at bedtime     Follow up in 2 weeks

## 2020-12-30 NOTE — NURSING NOTE
Chief Complaints and History of Present Illnesses   Patient presents with     Post Op (Ophthalmology) Left Eye     POW  #1 vitrectomy of LE     Chief Complaint(s) and History of Present Illness(es)     Post Op (Ophthalmology) Left Eye     Laterality: left eye    Quality: blurred vision    Frequency: constantly    Timing: throughout the day    Course: gradually improving    Associated symptoms: Negative for eye pain, discharge, dryness, tearing, burning, floaters, flashes, itching, foreign body sensation and swelling    Pain scale: 0/10    Comments: POW  #1 vitrectomy of LE              Comments     Chief Complaints and History of Present Illnesses   Patient presents with     Post Op (Ophthalmology) Left Eye     POW  #1 vitrectomy of LE     Chief Complaint(s) and History of Present Illness(es)     Post Op (Ophthalmology) Left Eye     Laterality: left eye    Quality: blurred vision    Frequency: constantly    Timing: throughout the day    Course: gradually improving    Associated symptoms: Negative for eye pain, discharge, dryness, tearing, burning, floaters, flashes, itching, foreign body sensation and swelling    Pain scale: 0/10    Comments: POW  #1 vitrectomy of LE              Comments     Feels [facility] slowly improving.   States instilling all eye meds as prescribed:  Max hay at bedtime to LE  Max drops four times a day to LE  Atropine daily to LE.  Maddison Mayen, COT COT 9:25 AM 12/30/2020

## 2020-12-30 NOTE — PROGRESS NOTES
Postoperative day 1 status post PPV/MP/retinectomy 12/21/20. 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 Denies eye pain.    Patient states vision improved in left eye  Doing well     Plan:  No heavy lifting   Silveira shield at all times  Retina detachment and endophthalmitis precautions were discussed with the patient and was asked to return if any of the those occur    I again had an extensive discussion about guarded prognosis of left eye. Patient was found to have funnel RD that despite best efforts, was not able to be . Nonetheless, the temporal folded retina appears flat on exam today and patient is happy with the improved.     Medications to operative eye  Maxitrol (pink top) four times a day    Maxitrol oint at bedtime  Atropine (red top) once a day     Follow up in two weeks for Post op check left eye and exam + likely avastin injection right eye      ~~~~~~~~~~~~~~~~~~~~~~~~~~~~~~~~~~   Complete documentation of historical and exam elements from today's encounter can be found in the full encounter summary report (not reduplicated in this progress note).  I personally obtained the chief complaint(s) and history of present illness.  I confirmed and edited as necessary the review of systems, past medical/surgical history, family history, social history, and examination findings as documented by others; and I examined the patient myself.  I personally reviewed the relevant tests, images, and reports as documented above.  I formulated and edited as necessary the assessment and plan and discussed the findings and management plan with the patient and family    Darryl Kang MD  Vitreoretinal Surgery Fellow  [facility]

## 2020-12-31 DIAGNOSIS — E11.21 TYPE 2 DIABETES MELLITUS WITH DIABETIC NEPHROPATHY, WITH LONG-TERM CURRENT USE OF INSULIN (H): ICD-10-CM

## 2020-12-31 DIAGNOSIS — Z79.4 TYPE 2 DIABETES MELLITUS WITH DIABETIC NEPHROPATHY, WITH LONG-TERM CURRENT USE OF INSULIN (H): ICD-10-CM

## 2020-12-31 NOTE — TELEPHONE ENCOUNTER
Pt requesting Rx for Accu-Chek guide strips    Thank you!  Cely Ahn [facility]

## 2021-01-04 ENCOUNTER — TELEPHONE (OUTPATIENT)
Dept: CARDIOLOGY | Facility: CLINIC | Age: 66
End: 2021-01-04

## 2021-01-04 ENCOUNTER — APPOINTMENT (OUTPATIENT)
Dept: INTERPRETER SERVICES | Facility: CLINIC | Age: 66
End: 2021-01-04
Payer: COMMERCIAL

## 2021-01-05 ENCOUNTER — ANCILLARY PROCEDURE (OUTPATIENT)
Dept: CARDIOLOGY | Facility: CLINIC | Age: 66
End: 2021-01-05
Attending: INTERNAL MEDICINE
Payer: COMMERCIAL

## 2021-01-05 ENCOUNTER — APPOINTMENT (OUTPATIENT)
Dept: MEDSURG UNIT | Facility: CLINIC | Age: 66
End: 2021-01-05
Attending: INTERNAL MEDICINE
Payer: COMMERCIAL

## 2021-01-05 ENCOUNTER — HOSPITAL ENCOUNTER (OUTPATIENT)
Facility: CLINIC | Age: 66
Discharge: HOME | End: 2021-01-05
Attending: INTERNAL MEDICINE | Admitting: INTERNAL MEDICINE
Payer: COMMERCIAL

## 2021-01-05 ENCOUNTER — RESULTS ONLY (OUTPATIENT)
Dept: OTHER | Facility: CLINIC | Age: 66
End: 2021-01-05

## 2021-01-05 VITALS
WEIGHT: 181.6 LBS | SYSTOLIC BLOOD PRESSURE: 128 MMHG | HEART RATE: 89 BPM | HEIGHT: 65 IN | OXYGEN SATURATION: 97 % | DIASTOLIC BLOOD PRESSURE: 75 MMHG | BODY MASS INDEX: 30.26 KG/M2

## 2021-01-05 VITALS
TEMPERATURE: 98.5 F | HEART RATE: 95 BPM | DIASTOLIC BLOOD PRESSURE: 76 MMHG | RESPIRATION RATE: 18 BRPM | SYSTOLIC BLOOD PRESSURE: 152 MMHG | OXYGEN SATURATION: 97 %

## 2021-01-05 DIAGNOSIS — Z94.1 HEART REPLACED BY TRANSPLANT (H): ICD-10-CM

## 2021-01-05 DIAGNOSIS — Z94.1 HEART TRANSPLANT, ORTHOTOPIC, STATUS (H): ICD-10-CM

## 2021-01-05 DIAGNOSIS — Z13.220 LIPID SCREENING: ICD-10-CM

## 2021-01-05 DIAGNOSIS — Z94.1 HEART REPLACED BY TRANSPLANT (H): Primary | ICD-10-CM

## 2021-01-05 LAB
ALBUMIN SERPL-MCNC: 3.5 G/DL (ref 3.4–5)
ALP SERPL-CCNC: 103 U/L (ref 40–150)
ALT SERPL W P-5'-P-CCNC: 20 U/L (ref 0–70)
ANION GAP SERPL CALCULATED.3IONS-SCNC: 7 MMOL/L (ref 3–14)
AST SERPL W P-5'-P-CCNC: 14 U/L (ref 0–45)
BILIRUB SERPL-MCNC: 0.3 MG/DL (ref 0.2–1.3)
BUN SERPL-MCNC: 28 MG/DL (ref 7–30)
CALCIUM SERPL-MCNC: 9.2 MG/DL (ref 8.5–10.1)
CHLORIDE SERPL-SCNC: 107 MMOL/L (ref 94–109)
CHOLEST SERPL-MCNC: 133 MG/DL
CK SERPL-CCNC: 79 U/L (ref 30–300)
CO2 SERPL-SCNC: 25 MMOL/L (ref 20–32)
CREAT SERPL-MCNC: 1.68 MG/DL (ref 0.66–1.25)
ERYTHROCYTE [DISTWIDTH] IN BLOOD BY AUTOMATED COUNT: 15.1 % (ref 10–15)
GFR SERPL CREATININE-BSD FRML MDRD: 41 ML/MIN/{1.73_M2}
GLUCOSE SERPL-MCNC: 137 MG/DL (ref 70–99)
HCT VFR BLD AUTO: 38.9 % (ref 40–53)
HDLC SERPL-MCNC: 40 MG/DL
HGB BLD-MCNC: 12.2 G/DL (ref 13.3–17.7)
LDLC SERPL CALC-MCNC: 58 MG/DL
MAGNESIUM SERPL-MCNC: 1.9 MG/DL (ref 1.6–2.3)
MCH RBC QN AUTO: 28.3 PG (ref 26.5–33)
MCHC RBC AUTO-ENTMCNC: 31.4 G/DL (ref 31.5–36.5)
MCV RBC AUTO: 90 FL (ref 78–100)
NONHDLC SERPL-MCNC: 94 MG/DL
PHOSPHATE SERPL-MCNC: 3.2 MG/DL (ref 2.5–4.5)
PLATELET # BLD AUTO: 229 10E9/L (ref 150–450)
POTASSIUM SERPL-SCNC: 4.3 MMOL/L (ref 3.4–5.3)
PROT SERPL-MCNC: 6.6 G/DL (ref 6.8–8.8)
RBC # BLD AUTO: 4.31 10E12/L (ref 4.4–5.9)
SODIUM SERPL-SCNC: 139 MMOL/L (ref 133–144)
TACROLIMUS BLD-MCNC: 5.7 UG/L (ref 5–15)
TME LAST DOSE: NORMAL H
TRIGL SERPL-MCNC: 179 MG/DL
WBC # BLD AUTO: 4.3 10E9/L (ref 4–11)

## 2021-01-05 PROCEDURE — 86833 HLA CLASS II HIGH DEFIN QUAL: CPT | Performed by: PATHOLOGY

## 2021-01-05 PROCEDURE — 88346 IMFLUOR 1ST 1ANTB STAIN PX: CPT | Mod: 26 | Performed by: PATHOLOGY

## 2021-01-05 PROCEDURE — 93505 ENDOMYOCARDIAL BIOPSY: CPT | Performed by: INTERNAL MEDICINE

## 2021-01-05 PROCEDURE — 82550 ASSAY OF CK (CPK): CPT | Performed by: PATHOLOGY

## 2021-01-05 PROCEDURE — 80053 COMPREHEN METABOLIC PANEL: CPT | Performed by: PATHOLOGY

## 2021-01-05 PROCEDURE — 36415 COLL VENOUS BLD VENIPUNCTURE: CPT | Performed by: PATHOLOGY

## 2021-01-05 PROCEDURE — 83735 ASSAY OF MAGNESIUM: CPT | Performed by: PATHOLOGY

## 2021-01-05 PROCEDURE — 93306 TTE W/DOPPLER COMPLETE: CPT | Performed by: STUDENT IN AN ORGANIZED HEALTH CARE EDUCATION/TRAINING PROGRAM

## 2021-01-05 PROCEDURE — C1894 INTRO/SHEATH, NON-LASER: HCPCS | Performed by: INTERNAL MEDICINE

## 2021-01-05 PROCEDURE — 86832 HLA CLASS I HIGH DEFIN QUAL: CPT | Performed by: PATHOLOGY

## 2021-01-05 PROCEDURE — 272N000001 HC OR GENERAL SUPPLY STERILE: Performed by: INTERNAL MEDICINE

## 2021-01-05 PROCEDURE — 99000 SPECIMEN HANDLING OFFICE-LAB: CPT | Performed by: PATHOLOGY

## 2021-01-05 PROCEDURE — 80197 ASSAY OF TACROLIMUS: CPT | Performed by: PATHOLOGY

## 2021-01-05 PROCEDURE — 250N000009 HC RX 250: Performed by: INTERNAL MEDICINE

## 2021-01-05 PROCEDURE — 88346 IMFLUOR 1ST 1ANTB STAIN PX: CPT | Mod: TC | Performed by: INTERNAL MEDICINE

## 2021-01-05 PROCEDURE — 88350 IMFLUOR EA ADDL 1ANTB STN PX: CPT | Mod: 26 | Performed by: PATHOLOGY

## 2021-01-05 PROCEDURE — 88307 TISSUE EXAM BY PATHOLOGIST: CPT | Mod: TC | Performed by: INTERNAL MEDICINE

## 2021-01-05 PROCEDURE — 80061 LIPID PANEL: CPT | Performed by: PATHOLOGY

## 2021-01-05 PROCEDURE — 86352 CELL FUNCTION ASSAY W/STIM: CPT | Mod: 90 | Performed by: PATHOLOGY

## 2021-01-05 PROCEDURE — 84100 ASSAY OF PHOSPHORUS: CPT | Performed by: PATHOLOGY

## 2021-01-05 PROCEDURE — 93505 ENDOMYOCARDIAL BIOPSY: CPT | Mod: 26 | Performed by: INTERNAL MEDICINE

## 2021-01-05 PROCEDURE — 999N000132 HC STATISTIC PP CARE STAGE 1

## 2021-01-05 PROCEDURE — 250N000009 HC RX 250: Performed by: NURSE PRACTITIONER

## 2021-01-05 PROCEDURE — G0463 HOSPITAL OUTPT CLINIC VISIT: HCPCS

## 2021-01-05 PROCEDURE — 88350 IMFLUOR EA ADDL 1ANTB STN PX: CPT | Mod: TC | Performed by: INTERNAL MEDICINE

## 2021-01-05 PROCEDURE — 85027 COMPLETE CBC AUTOMATED: CPT | Performed by: PATHOLOGY

## 2021-01-05 PROCEDURE — 87799 DETECT AGENT NOS DNA QUANT: CPT | Performed by: PATHOLOGY

## 2021-01-05 PROCEDURE — 99215 OFFICE O/P EST HI 40 MIN: CPT | Performed by: NURSE PRACTITIONER

## 2021-01-05 PROCEDURE — 88307 TISSUE EXAM BY PATHOLOGIST: CPT | Mod: 26 | Performed by: PATHOLOGY

## 2021-01-05 RX ORDER — LIDOCAINE 40 MG/G
CREAM TOPICAL
Status: COMPLETED | OUTPATIENT
Start: 2021-01-05 | End: 2021-01-05

## 2021-01-05 RX ORDER — PANTOPRAZOLE SODIUM 40 MG/1
40 TABLET, DELAYED RELEASE ORAL EVERY MORNING
COMMUNITY
Start: 2020-11-06 | End: 2023-05-08

## 2021-01-05 RX ADMIN — LIDOCAINE: 40 CREAM TOPICAL at 11:36

## 2021-01-05 ASSESSMENT — MIFFLIN-ST. JEOR: SCORE: 1525.61

## 2021-01-05 ASSESSMENT — PAIN SCALES - GENERAL: PAINLEVEL: NO PAIN (0)

## 2021-01-05 NOTE — PROGRESS NOTES
Prepped for RH/BX.  Denies pain.  Family available for post-procedure transport.  H & P current.  Consent needed.  Labs complete.

## 2021-01-05 NOTE — PROGRESS NOTES
Returned from [facility] [place] to 2A @ 1420.Patient tolerated recovery stage well. VSS, RIJV site clean/dry/intact, no hematoma, and has mild tenderness at site to touch. Patient tolerated PO fluids, did not want food until he gets home. Teaching was done and discharge instructions were given.  Patient discharged from the hospital via ambulatory to home with his family.

## 2021-01-05 NOTE — IP AVS SNAPSHOT
[facility] [address] [address]                                    After Visit Summary   1/5/2021    Lucian Henderson     MRN: [id number]           After Visit Summary Signature Page    I have received my discharge instructions, and my questions have been answered. I have discussed any challenges I see with this plan with the nurse or doctor.    ..........................................................................................................................................  Patient/Patient Representative Signature      ..........................................................................................................................................  Patient Representative Print Name and Relationship to Patient    ..................................................               ................................................  Date                                   Time    ..........................................................................................................................................  Reviewed by Signature/Title    ...................................................              ..............................................  Date                                               Time          22EPIC Rev 08/18

## 2021-01-05 NOTE — DISCHARGE INSTRUCTIONS
[facility]                        Interventional Cardiology  Discharge Instructions   Post Right Heart Cath      AFTER YOU GO HOME:    DO drink plenty of fluids    DO resume your regular diet and medications unless otherwise instructed by your Primary Physician    Do Not scrub the procedure site vigorously    No lotion or powder to the puncture site for 3 days    CALL YOUR PRIMARY PHYSICIAN IF: You may resume all normal activity.  Monitor neck site for bleeding, swelling, or voice changes. If you notice bleeding or swelling immediately apply pressure to the site and call number below to speak with Cardiology Fellow.  If you experience any changes in your breathing you should call your doctor immediately or come to the closest Emergency Department.  Do not drive yourself.    ADDITIONAL INSTRUCTIONS: Medications: You are to resume all home medications including anticoagulation therapy unless otherwise advised by your primary cardiologist or nurse coordinator.    Follow Up: Per your primary cardiology team    If you have any questions or concerns regarding your procedure site please call [phone] at anytime and ask for Cardiology Fellow on call.  They are available 24 hours a day.  You may also contact the Cardiology Clinic after hours number at [phone].                                                       Telephone Numbers [phone] Monday-Friday 8:00 am to 4:30 pm    [phone] After 4:30 pm Monday-Friday, Weekends & Holidays  Ask for Interventional Cardiologist on call. Someone is on call 24 hours/day   [facility] toll free number [phone] Monday-Friday 8:00 am to 4:30 pm   [facility] Emergency Dept [phone]

## 2021-01-05 NOTE — PATIENT INSTRUCTIONS
Patient Instructions  1. Continue to wear the compression stockings to help with leg swelling.  2. Work on diet and exercise to decrease your triglyceride levels.  3. Christelle will call you with all lab and testing results from today.    Next transplant clinic appointment:  In two months for 8 month visit.  Next lab draw: TBD  Coordinator will call with all pending results.     Please call transplant coordinator with any questions:    Christelle Pettit RN BSN   Post Heart Transplant Nurse Coordinator  [facility]  Questions: [phone]

## 2021-01-05 NOTE — LETTER
1/5/2021      RE: Lucian Henderson Sr.  [address] [facility] [address]       Dear Colleague,    Thank you for the opportunity to participate in the care of your patient, Lucian Henderson Sr., at the [facility] [place] [facility]. Please see a copy of my visit note below.    ADULT HEART TRANSPLANT CLINIC  January 5, 2021    HPI:   Mr. Lucian Henderson is a 65yr old male with a history of CAD (s/p 3v CABG 2008), ICM s/p OHT 7/19/20, DMII, CKD, and HTN who presents to clinic for routine follow up.    His post-operative course was c/b primary graft dysfunction (LVEF 20-25% and severe RV dysfunction, thought to be secondary to prolonged ischemic time, resolved by f/up TTE 7/27), VT, pericardial effusion (incidentally found per TTE 7/27, decreased size noted on TTE 7/29), donor streptococcus salivarius bacteremia (s/p 7d course of ceftriaxone), and RUE DVT c/b HIT (s/p PLEX).  He ultimately discharged 8/3.    He was readmitted to [facility] 8/24 with hyperglycemia (BGs 500s).  He did not have any evidence of DKA, HHS, infection, or acute illness.  Endocrine was consulted, and it was discovered that he may have been incorrectly administering insulin at home.  He was also found to be anemic, thought to be secondary to bone marrow suppression.  Peripheral smear 8/24 showed markedly slightly macrocytic anemia with preserved erythrocyte regeneration, and no definitive evidence of hemolysis.  He was also found to have subtherapeutic tacro levels, so was started on clotrimazole troches to help boost his levels.  He ultimately discharged 8/28.    He presented to the ED 9/22 with fevers and drainage from his former ICD subclavian site.  Chest/abd CT also showed concerns for cellulitis in his epigastric region.  He underwent surgical debridement of his left infraclavicular wound and debridement of midline chest tube wound on 9/23, which was c/b  "bleeding and required reoperation 9/28 for I&D of hematoma.  Cultures were positive for Pseudomonas aeruginosa, so he was treated with 4 weeks of cipro per Transplant ID and CVTS.  He developed leukopenia and moderate neutropenia, so his cellcept was held then restarted at low dose, and valcyte was stopped.  He received neupogen x 1.  He ultimately discharged with a wound vac 10/5.    He presented to the ED 12/9 with right vision loss.  Ophthalmology was consulted, and found that he had a vitreous hemorrhage in his right eye and total retinal detachment of his left eye.  He was then seen in the eye clinic 12/10, where he was advised an Avastin injection in the right eye (for proliferative diabetic retinopathy and vitreous hemorrhage).  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  He has no DSAs.  Last AlloMap score 12/2020 was 32.  Baseline coronary angiogram has been deferred.  Last TTE 10/2020 showed normal graft function, with LVEF 55-60% and normal RV size/function.  Last RHC 12/2020 showed normal biventricular filling pressures, with RA 3, mPA 18, PCW 8, and CI 3.08.    Since his last visit, he states that he has been doing well.  He is not exercising much, just moving around his house.  He completed cardiac rehab, and feels like he has good strength and endurance.  His breathing has been good, and he denies SOB, PND, and orthopnea.  He has been sleeping well.  His appetite has been good, and he denies nausea, vomiting, diarrhea, and constipation.  His weight has been stable, within 1-2#, ranging ~178#, and he denies fluid retention.  He continues to take lasix 20mg once daily.  His BPs remain < 140/90.  His vision has improved since his retinal procedure.  He otherwise " denies chest pain, palpitations, dizziness, headaches, acute vision changes, fevers, chills, cough, sore throat, and signs of bleeding.    Serostatus:  CMV D+/R+  EBV D+/R+  Toxo D+/R-    Patient Active Problem List    Diagnosis Date Noted     Traction detachment of left retina 12/14/2020     Priority: Medium     Added automatically from request for surgery [id number]       Pseudomonas infection 10/05/2020     Priority: Medium     Need for prophylactic antibiotic 10/05/2020     Priority: Medium     Trina-Barr virus viremia 10/05/2020     Priority: Medium     Sepsis (H) 09/22/2020     Priority: Medium     Hyperglycemia 08/24/2020     Priority: Medium     Heart transplant, orthotopic, status (H) 07/20/2020     Priority: Medium     CHF (congestive heart failure) (H) 07/03/2020     Priority: Medium     Acute on chronic systolic congestive heart failure (H) 07/03/2020     Priority: Medium     Added automatically from request for surgery [id number]       Heart failure (H) 07/03/2020     Priority: Medium     Added automatically from request for surgery [id number]       Dental caries 07/03/2020     Priority: Medium     Added automatically from request for surgery [id number]       Heart replaced by transplant (H) 07/03/2020     Priority: Medium     Added automatically from request for surgery [id number]       Status post coronary angiogram 07/02/2020     Priority: Medium     Coronary artery disease involving native coronary artery of native heart without angina pectoris 06/18/2020     Priority: Medium     Added automatically from request for surgery [id number]       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, unspecified whether long term insulin use (H) 11/27/2019     Priority: Medium     Added automatically from request for surgery [id number]       Nuclear cataract, nonsenile 11/27/2019     Priority: Medium     Added automatically from request for surgery [id number]       Heart transplant candidate  07/18/2019     Priority: Medium     Systolic heart failure (H) 05/14/2019     Priority: Medium     Added automatically from request for surgery [id number]       CHANTEL (obstructive sleep apnea)- severe (AHI 30) 10/06/2016     Priority: Medium     Study Date: 10/03/2016- (196.0 lbs) apnea/hypopnea index 30.8. REM AHI 40,  supine AHI n/a. RDI  33.1 . Lowest oxygen saturation 82.8%.  Time spent less than or equal to 88% 4.9 minutes.  Time spent less than or equal to 89% 7.3 minutes. CPAP final  pressure 7 cmH2O with AHI of 0.8.  Time in REM supine on final pressure 0 minutes. No consolidated sleep seen in supine position. During diagnostic portion of study, PLM index 18.2. During treatment portion of study,  PLM index 19.3.             Moderate nonproliferative diabetic retinopathy, without macular edema, associated with type 2 diabetes mellitus 08/16/2016     Priority: Medium     Cortical cataract of both eyes 08/15/2016     Priority: Medium     Secondary renal hyperparathyroidism (H) 07/26/2016     Priority: Medium     Proteinuria 03/18/2016     Priority: Medium     Vitamin D deficiency 03/18/2016     Priority: Medium     OA (osteoarthritis) of knee 03/18/2016     Priority: Medium     Chondromalacia of patella, unspecified laterality 03/18/2016     Priority: Medium     Pain in joint of left shoulder 03/18/2016     Priority: Medium     Tinnitus 03/18/2016     Priority: Medium     left        Ptosis of right eyelid 03/18/2016     Priority: Medium     Chronic systolic heart failure (H)      Priority: Medium     Echo 7/2015 LVEF 18%       Automatic implantable cardioverter-defibrillator - [employer] single lead ICD, Not Dependent 08/18/2015     Priority: Medium     Health Care Home 01/19/2015     Priority: Medium     *See Letters for [facility] Care Plan: Emergency Care Plan         CKD (chronic kidney disease) stage 3, GFR 30-59 ml/min 01/28/2013     Priority: Medium     CHF (NYHA class II, ACC/AHA stage C) (H) 08/15/2012      Priority: Medium     Hypertension goal BP (blood pressure) < 140/90 01/21/2011     Priority: Medium     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) 10/31/2010     Priority: Medium     Goal <8%       Hyperlipidemia LDL goal <100 10/31/2010     Priority: Medium     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 (H) 03/15/2010     Priority: Medium     MI and open heart with 1 stent placed in 2008; another minor MI in 2009.  MI on 2/19/15. [facility]19[facility]15 showed severe 3 vessel native CAD (all three vessels [LAD, LCx and RCA] reported to be 100% ocludded at their ostia). All his grafts were considered to be occluded except for LIMA-to-LAD, which was patent and supplied left-to-left, as well as, left-to-right collaterals. There were no targets suitable for revascularization and patient was put on medical therapy.        Obesity 09/26/2016     Priority: Low       PAST MEDICAL HISTORY:  Past Medical History:   Diagnosis Date     CAD (coronary artery disease)      CHF (congestive heart failure) (H)      CKD (chronic kidney disease), stage III      Cortical cataract of both eyes      Diabetes (H)      Hyperlipidemia      Hypertension      Ischemic cardiomyopathy      Obesity      CHANTEL (obstructive sleep apnea)     occas cpap     Osteoarthritis        CURRENT MEDICATIONS:  Prescription Medications as of 1/5/2021       Rx Number Disp Refills Start End Last Dispensed Date Next Fill Date Owning Pharmacy    acetaminophen (TYLENOL) 325 MG tablet  60 tablet 3 9/2/2020    [facility] - [place], [place] - [address]    Sig: Take 3 tablets (975 mg) by mouth every 8 hours as needed for mild pain    Class: E-Prescribe    Route: Oral    Alcohol Swabs PADS  100 each 0 8/3/2020    [facility] - [place], [place] - [address]    Sig: Use to swab the area of the  injection or sergio as directed   Per insurance coverage    Class: Local Print    aspirin (ASA) 81 MG chewable tablet  120 tablet 0 10/6/2020    [facility] - [place], [place] - [address]    Sig: Take 1 tablet (81 mg) by mouth daily    Class: E-Prescribe    Route: Oral    atropine 1 % ophthalmic solution            Sig: Place 1 drop Into the left eye daily    Class: Historical    Route: Left Eye    blood glucose (NO BRAND SPECIFIED) test strip  200 strip 3 1/2/2021    [facility] [address]    Sig: Use to test blood sugar 2 times daily or as directed.    Class: E-Prescribe    blood glucose monitoring (ACCU-CHEK FELIPE SMARTVIEW) meter device kit  1 kit 0 2/20/2017    [facility] [address]    Sig: Use to test blood sugar 3-4 times daily, as directed.    Class: E-Prescribe    blood glucose monitoring (ONE TOUCH DELICA) lancets  200 each 3 11/25/2019    [id number] [address] AT [facility]    Sig: Use to test blood sugars 2 times daily.    Class: E-Prescribe    calcium carbonate 600 mg-vitamin D 400 units (CALTRATE) 600-400 MG-UNIT per tablet  180 tablet 3 8/18/2020    [facility]    Sig: Take 1 tablet by mouth 2 times daily (with meals)    Class: E-Prescribe    Route: Oral    Continuous Blood Gluc Sensor (FREESTYLE JEREMY 14 DAY SENSOR) [facility]  2 each 11 8/28/2020    [facility]    Sig: Change every 14 days.    Class: Local Print    Continuous Blood Gluc Sensor (FREESTYLE JEREMY 14 DAY SENSOR) MISC  6 each 4 8/18/2020    [id number] [facility] [address] AT [facility]    Sig: Change every 14 days.    Class: E-Prescribe    Notes to Pharmacy: Use to check BG 5 times daily.    furosemide (LASIX) 20 MG tablet  90 tablet 3 10/12/2020    [facility] - [address]    Sig: Take 1 tablet (20 mg) by mouth daily    Class: E-Prescribe    Route: Oral    HUMALOG KWIKPEN 100 UNIT/ML soln  90 mL 1 11/10/2020    [id number] - [facility] [address] AT [address]    Sig: Inject 0-31 Units Subcutaneous 3 times daily (with meals) + Custom MEAL and SNACK corrective dosing   Approx 90 units daily max    Class: E-Prescribe    Cosign for Ordering: Accepted by Marisabel Prieto PA-C on 11/19/2020 10:04 PM    hydrALAZINE (APRESOLINE) 100 MG tablet  270 tablet 3 11/13/2020    [facility] [address]    Sig: Take 1 tablet (100 mg) by mouth every 8 hours    Class: E-Prescribe    Route: Oral    insulin aspart (NOVOLOG PEN) 100 UNIT/ML pen  6 mL 0 10/5/2020    [address]    Sig: Inject 0-31 Units Subcutaneous 3 times daily (with meals) Custom MEAL and SNACK corrective dosing     BG less than 80, do not give Novolog.  BG , give  15 units.  -169, give  17 units.  -199 give 19 units.  -229 give 21 units.  -259 give 23 units.  -289 give 25 units.  -319 give 27 units.  -349 give 29 units.  BG >/= 350 give 31 units.  To be given with meal based on pre-meal blood glucose    Class: Local Print    Route: Subcutaneous    insulin isophane human (HUMULIN N PEN) 100 UNIT/ML injection    11/30/2020    [id number] - [facility] [address] AT [address]    Sig: Inject 35 Units Subcutaneous every morning    Class: No Print Out    Route: Subcutaneous    insulin isophane human (HUMULIN N PEN) 100 UNIT/ML injection  3 mL 1 10/5/2020    [address]    Sig: Inject 8 Units Subcutaneous At Bedtime    Class: E-Prescribe    Route: Subcutaneous    insulin pen needle (32G X 4 MM)  32G X 4 MM miscellaneous  100 each 0 8/3/2020    [facility] - [place], [place] - [address]    Sig: Use as directed by provider  Per insurance coverage    Class: Local Print    insulin pen needle (B-D U/F) 31G X 5 MM miscellaneous  100 each 3 2020    [facility] - [place], [place] - [address]    Si Units by Device route 2 times daily Use 2 pen needles daily or as directed.    Class: E-Prescribe    Route: Device    insulin pen needle (NOVOFINE) 30G X 8 MM miscellaneous  200 each 3 2020    [id number] [address] AT [facility] [address]    Sig: Inject 1 Box Subcutaneous 6 times daily Use 6 pen needles daily or as directed.    Class: E-Prescribe    Notes to Pharmacy: Has both Novolog Flex pen and several Humalog Kwik pen, but doesn't have correct needle.    Route: Subcutaneous    magnesium oxide 400 MG CAPS  180 capsule 3 2020    [facility] - [place], [place] - [address] [place] [address] [address]    Sig: Take 400 mg by mouth 2 times daily    Class: E-Prescribe    Route: Oral    mycophenolate (GENERIC EQUIVALENT) 250 MG capsule  240 capsule 11 2020    [facility] - [address]    Sig: Take 4 capsules (1,000 mg) by mouth 2 times daily    Class: E-Prescribe    Notes to Pharmacy: TXP DT 2020 (Heart) TXP Dischg DT  DX Heart replaced by transplant Z94.1 TX Center [facility] ([place], [place])    Route: Oral    neomycin-polymixin-dexamethasone (MAXITROL) 0.1 % ophthalmic suspension  5 mL 0 2020    [facility] [place], [place] - [address]    Sig: Apply 1 drop to eye 4 times daily Instill into operative eye(s) per physician instructions.    Class: E-Prescribe    Route: Ophthalmic    neomycin-polymixin-dexamethasone (MAXITROL) ophthalmic ointment            Sig: Place 1 Application Into the  left eye At Bedtime    Class: Historical    Route: Left Eye    order for DME  1 Units 0 2017        Sig: Auto CPAP 8-15 cmH20    Class: Sleep Center    pantoprazole (PROTONIX) 40 MG EC tablet    2020    [facility] [place] [address]    Class: Historical    polyethylene glycol (MIRALAX) 17 g packet  7 packet 0 2020    [address]    Si g by Oral or Feeding Tube route daily as needed for constipation    Class: E-Prescribe    Route: Oral or Feeding Tube    rosuvastatin (CRESTOR) 10 MG tablet  90 tablet 3 2020    [facility] [place] [address] [address]    Sig: Take 1 tablet (10 mg) by mouth daily    Class: E-Prescribe    Route: Oral    senna-docusate (SENOKOT-S/PERICOLACE) 8.6-50 MG tablet  60 tablet 0 10/5/2020    [address]    Sig: Take 1 tablet by mouth 2 times daily (hold for loose stools)    Class: E-Prescribe    Route: Oral    sulfamethoxazole-trimethoprim (BACTRIM) 400-80 MG tablet  90 tablet 3 2020    [facility] [address]     Sig: Take 1 tablet by mouth daily    Class: E-Prescribe    Route: Oral    tacrolimus (GENERIC EQUIVALENT) 1 MG capsule  280 capsule 11 2020    [facility] [address] [address]     Sig: Take 5 capsules (5 mg) by mouth every morning AND 4 capsules (4 mg) every evening.    Class: E-Prescribe    Notes to Pharmacy: TXP DT 2020 (Heart) TXP Dischg DT  DX Heart transplant Z94.1 TX Center [facility] ([place])    Route: Oral    tamsulosin (FLOMAX) 0.4 MG capsule  60 capsule 0 10/6/2020    [facility] [address]    Sig: Take 1 capsule (0.4 mg) by mouth daily    Class: E-Prescribe    Route: Oral     "  Clinic-Administered Medications as of 1/5/2021       Dose Frequency Start End    bevacizumab (AVASTIN) intravitreal inj 1.25 mg 1.25 mg EVERY 28 DAYS 12/10/2020 11/11/2021    Admin Instructions: Prn every 3-52 weeksMAYE Rodriguez [phone]    Route: Intravitreal          ROS:   Constitutional: No fever, chills, or sweats. Stable weight.   ENT: As per HPI.   Allergies/Immunologic: Negative.   Respiratory: As per HPI.  Cardiovascular: As per HPI.   GI: No nausea, vomiting, hematemesis, melena, or hematochezia.   : No urinary frequency, dysuria, or hematuria.   Integument: Negative.   Psychiatric: Negative.   Neuro: Negative.   Endocrinology: Negative.   Musculoskeletal: Negative    Exam:  /75 (BP Location: Right arm, Patient Position: Chair, Cuff Size: Adult Large)   Pulse 89   Ht 1.651 m (5' 5\")   Wt 82.4 kg (181 lb 9.6 oz)   SpO2 97%   BMI 30.22 kg/m    In general, the patient is a pleasant male in no apparent distress.    HEENT: Wearing black sunglasses.  Neck:  No adenopathy, No thyromegaly.    COR: No jugular venous distention when sitting upright.  RRR.  Normal S1 S2 splits physiologically.  No murmur, rub click, or gallop.    Lungs:  CTA. No rhonchi.    Abdomen: soft, nontender, nondistended.  No organomegaly.  Extremities:  No clubbing or cyanosis, moderate bilateral LE edema with pitting to mid-calf.  Neuro: Alert & Oriented x 3, grossly non focal.  Integument: no open lesions, rashes, or jaundice.    Labs:  CBC RESULTS:   Lab Results   Component Value Date    WBC 4.3 01/05/2021    RBC 4.31 (L) 01/05/2021    HGB 12.2 (L) 01/05/2021    HCT 38.9 (L) 01/05/2021    MCV 90 01/05/2021    MCH 28.3 01/05/2021    MCHC 31.4 (L) 01/05/2021    RDW 15.1 (H) 01/05/2021     01/05/2021       BMP RESULTS:  Lab Results   Component Value Date     01/05/2021    POTASSIUM 4.3 01/05/2021    CHLORIDE 107 01/05/2021    CO2 25 01/05/2021    ANIONGAP 7 01/05/2021     (H) 01/05/2021    BUN 28 " 01/05/2021    CR 1.68 (H) 01/05/2021    GFRESTIMATED 41 (L) 01/05/2021    GFRESTBLACK 48 (L) 01/05/2021    BRYANNA 9.2 01/05/2021      LIPID RESULTS:  Lab Results   Component Value Date    CHOL 133 01/05/2021    HDL 40 01/05/2021    LDL 58 01/05/2021    TRIG 179 (H) 01/05/2021    CHOLHDLRATIO 2.6 06/27/2015    NHDL 94 01/05/2021       IMMUNOSUPPRESSANT LEVELS  Lab Results   Component Value Date    TACROL 8.3 12/07/2020    DOSTAC Not Provided 12/07/2020       No components found for: CK  Lab Results   Component Value Date    MAG 1.9 01/05/2021     Lab Results   Component Value Date    A1C 5.9 (H) 12/07/2020     Lab Results   Component Value Date    PHOS 3.2 01/05/2021     Lab Results   Component Value Date    NTBNPI 8,792 (H) 09/22/2020     Lab Results   Component Value Date    SAITESTMET [facility] 10/12/2020    SAICELL Class I 10/12/2020    [id number] None 10/12/2020    [id number] None 10/12/2020    SAIREPCOM  10/12/2020      Test performed by modified procedure. Serum heat inactivated and tested   by a modified ([place]) protocol including fetal calf serum addition.   High-risk, mfi >3,000. Mod-risk, mfi 500-3,000.       Lab Results   Component Value Date    SAIITESTME [facility] 10/12/2020    SAIICELL Class II 10/12/2020    [id number] None 10/12/2020    [id number] None 10/12/2020    SAIIREPCOM  10/12/2020      Test performed by modified procedure. Serum heat inactivated and tested   by a modified ([place]) protocol including fetal calf serum addition.   High-risk, mfi >3,000. Mod-risk, mfi 500-3,000.       Lab Results   Component Value Date    [facility] Plasma 10/20/2020       Assessment and Plan:  Mr. Lucian Henderson is a 65yr old male with a history of CAD (s/p 3v CABG 2008), ICM s/p OHT 7/19/20, DMII, CKD, and HTN who presents to clinic for routine follow up.    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  He ultimately discharged 8/3.    He has been readmitted for hyperglycemia (8/2020) and wound infection (s/p I&D 9/23/20).  He has been following with the eye clinic, as noted below.      His biopsies have remained negative for rejection.  He has no DSAs.  Last AlloMap score 12/2020 was 32.  Baseline coronary angiogram has been deferred.  Last TTE 10/2020 showed normal graft function, with LVEF 55-60% and normal RV size/function.  Last RHC 12/2020 showed normal biventricular filling pressures, with RA 3, mPA 18, PCW 8, and CI 3.08.    Labs today show stable electrolytes, renal function, liver function, and blood counts.  FLP shows elevated triglycerides.  CMV, EBV, DSAs, tacro levels are in process.  He will have an echo and RHC/bx following this appt.    Mr. Tee Henderson appears well today.  His weight remains stable, and he appears euvolemic.  Will eval his RHC numbers today, and determine if we can stop his lasix.    His BPs remain stable.    As his triglycerides are elevated, discussed increasing aerobic activity and heart-healthy dieting.  Will repeat a FLP with his annual surveillance, and will determine if additional treatment is necessary at that time.      Serostatus:  - CMV D+/R+  - EBV D+/R+  - Toxo D+/R-    Immunosuppression:  - MMF 1000mg twice daily  - tacro, decrease goal level to 6-8 once he reaches 6m post transplant.  Level today in process.    PPx:  - CAV:  Aspirin 81mg daily and rosuvastatin 10mg daily  - GI:  Pantoprazole 40mg daily  - Osteoporosis:  Calcium/vitamin D supplements  - Toxo:  Single strength bactrim, lifelong ppx    Graft function:  - BPs:  Stable, continue hydral 100mg TID  - fluid status:  Euvolemic, taking lasix 20mg daily.  Will re-eval  "lasix pending RHC results today.    Health maintenance:  - received flu shot  - needs baseline coronary angiogram --> will d/w Dr. Sheridan.      DMII  Follows with endo and PCP.    HIT  HIT antibody + 7/2020.  CORRINE +.  No heparin products.    RIJ and axillary vein DVT, nonocclusive  Right cephalic vein occlusive thrombus  S/p course of anticoagulation.    Total retinal detachment of the left eye, s/p retinectomy 12/21/20  Proliferative diabetic retinopathy  Right vitreous hemorrhage  He presented to the ED 12/9 with right vision loss.  Ophthalmology was consulted, and found that he had a vitreous hemorrhage in his right eye and total retinal detachment of his left eye.  He was then seen in the eye clinic 12/10, where he was advised an Avastin injection in the right eye (for proliferative diabetic retinopathy and vitreous hemorrhage).  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      The above was reviewed with Mr. Tee Henderson via professional .  He verbalized understanding and will call with further questions/concerns.    45 minutes spent with patient, along with preparing for visit, reviewing follow up, and completing documentation.      Arlin Guajardo DNP, FNP-BC, CHFN  Advanced Heart Failure Nurse Practitioner  [facility]  Patient Care Team:  No Ref-Primary, Physician as PCP - Diane Groves APRN CNP as Nurse Practitioner (Cardiology)  Arvin Sheridan MD as MD (Cardiology)  Yue Dowd MD as MD (INTERNAL MEDICINE - ENDOCRINOLOGY, DIABETES & METABOLISM)  Christelle Pettit, RN as Transplant Coordinator (Cardiology)  Negrito Rai [facility] as Pharmacist (Pharmacist)  [facility] [place] " ([facility] ([facility] ([place]  Denita Bueno MD as Assigned Palliative Care Provider  Triny Bunch MD as Assigned Surgical Provider  Anna Archuleta PA-C as Assigned PCP  SELF, REFERRED      Please do not hesitate to contact me if you have any questions/concerns.     Sincerely,     Arlin Guajardo NP

## 2021-01-05 NOTE — PROGRESS NOTES
[facility]ULT [facility]  January 5, 2021    HPI:   Mr. Lucian Henderson is a 65yr old male with a history of CAD (s/p 3v CABG 2008), ICM s/p OHT 7/19/20, DMII, CKD, and HTN who presents to clinic for routine follow up.    His post-operative course was c/b primary graft dysfunction (LVEF 20-25% and severe RV dysfunction, thought to be secondary to prolonged ischemic time, resolved by f/up TTE 7/27), VT, pericardial effusion (incidentally found per TTE 7/27, decreased size noted on TTE 7/29), donor streptococcus salivarius bacteremia (s/p 7d course of ceftriaxone), and RUE DVT c/b HIT (s/p PLEX).  He ultimately discharged 8/3.    He was readmitted to [facility] 8/24 with hyperglycemia (BGs 500s).  He did not have any evidence of DKA, HHS, infection, or acute illness.  Endocrine was consulted, and it was discovered that he may have been incorrectly administering insulin at home.  He was also found to be anemic, thought to be secondary to bone marrow suppression.  Peripheral smear 8/24 showed markedly slightly macrocytic anemia with preserved erythrocyte regeneration, and no definitive evidence of hemolysis.  He was also found to have subtherapeutic tacro levels, so was started on clotrimazole troches to help boost his levels.  He ultimately discharged 8/28.    He presented to the ED 9/22 with fevers and drainage from his former ICD subclavian site.  Chest/abd CT also showed concerns for cellulitis in his epigastric region.  He underwent surgical debridement of his left infraclavicular wound and debridement of midline chest tube wound on 9/23, which was c/b bleeding and required reoperation 9/28 for I&D of hematoma.  Cultures were positive for Pseudomonas aeruginosa, so he was treated with 4 weeks of cipro per Transplant ID and CVTS.  He developed leukopenia and moderate neutropenia, so his cellcept was held then restarted at low dose, and valcyte was stopped.  He received neupogen x 1.  He ultimately discharged  "with a wound vac 10/5.    He presented to the ED 12/9 with right vision loss.  Ophthalmology was consulted, and found that he had a vitreous hemorrhage in his right eye and total retinal detachment of his left eye.  He was then seen in the eye clinic 12/10, where he was advised an Avastin injection in the right eye (for proliferative diabetic retinopathy and vitreous hemorrhage).  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  He has no DSAs.  Last AlloMap score 12/2020 was 32.  Baseline coronary angiogram has been deferred.  Last TTE 10/2020 showed normal graft function, with LVEF 55-60% and normal RV size/function.  Last RHC 12/2020 showed normal biventricular filling pressures, with RA 3, mPA 18, PCW 8, and CI 3.08.    Since his last visit, he states that he has been doing well.  He is not exercising much, just moving around his house.  He completed cardiac rehab, and feels like he has good strength and endurance.  His breathing has been good, and he denies SOB, PND, and orthopnea.  He has been sleeping well.  His appetite has been good, and he denies nausea, vomiting, diarrhea, and constipation.  His weight has been stable, within 1-2#, ranging ~178#, and he denies fluid retention.  He continues to take lasix 20mg once daily.  His BPs remain < 140/90.  His vision has improved since his retinal procedure.  He otherwise denies chest pain, palpitations, dizziness, headaches, acute vision changes, fevers, chills, cough, sore throat, and signs of bleeding.    Serostatus:  CMV D+/R+  EBV D+/R+  Toxo D+/R-    Patient Active Problem List    Diagnosis Date Noted     Traction detachment of left retina 12/14/2020     Priority: Medium     Added automatically from request for surgery " [id number]       Pseudomonas infection 10/05/2020     Priority: Medium     Need for prophylactic antibiotic 10/05/2020     Priority: Medium     Trina-Barr virus viremia 10/05/2020     Priority: Medium     Sepsis (H) 09/22/2020     Priority: Medium     Hyperglycemia 08/24/2020     Priority: Medium     Heart transplant, orthotopic, status (H) 07/20/2020     Priority: Medium     CHF (congestive heart failure) (H) 07/03/2020     Priority: Medium     Acute on chronic systolic congestive heart failure (H) 07/03/2020     Priority: Medium     Added automatically from request for surgery [id number]       Heart failure (H) 07/03/2020     Priority: Medium     Added automatically from request for surgery [id number]       Dental caries 07/03/2020     Priority: Medium     Added automatically from request for surgery [id number]       Heart replaced by transplant (H) 07/03/2020     Priority: Medium     Added automatically from request for surgery [id number]       Status post coronary angiogram 07/02/2020     Priority: Medium     Coronary artery disease involving native coronary artery of native heart without angina pectoris 06/18/2020     Priority: Medium     Added automatically from request for surgery [id number]       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, unspecified whether long term insulin use (H) 11/27/2019     Priority: Medium     Added automatically from request for surgery [id number]       Nuclear cataract, nonsenile 11/27/2019     Priority: Medium     Added automatically from request for surgery [id number]       Heart transplant candidate 07/18/2019     Priority: Medium     Systolic heart failure (H) 05/14/2019     Priority: Medium     Added automatically from request for surgery [id number]       CHANTEL (obstructive sleep apnea)- severe (AHI 30) 10/06/2016     Priority: Medium     Study Date: 10/03/2016- (196.0 lbs) apnea/hypopnea index 30.8. REM AHI 40,  supine AHI n/a. RDI  33.1 . Lowest oxygen  saturation 82.8%.  Time spent less than or equal to 88% 4.9 minutes.  Time spent less than or equal to 89% 7.3 minutes. CPAP final  pressure 7 cmH2O with AHI of 0.8.  Time in REM supine on final pressure 0 minutes. No consolidated sleep seen in supine position. During diagnostic portion of study, PLM index 18.2. During treatment portion of study,  PLM index 19.3.             Moderate nonproliferative diabetic retinopathy, without macular edema, associated with type 2 diabetes mellitus 08/16/2016     Priority: Medium     Cortical cataract of both eyes 08/15/2016     Priority: Medium     Secondary renal hyperparathyroidism (H) 07/26/2016     Priority: Medium     Proteinuria 03/18/2016     Priority: Medium     Vitamin D deficiency 03/18/2016     Priority: Medium     OA (osteoarthritis) of knee 03/18/2016     Priority: Medium     Chondromalacia of patella, unspecified laterality 03/18/2016     Priority: Medium     Pain in joint of left shoulder 03/18/2016     Priority: Medium     Tinnitus 03/18/2016     Priority: Medium     left        Ptosis of right eyelid 03/18/2016     Priority: Medium     Chronic systolic heart failure (H)      Priority: Medium     Echo 7/2015 LVEF 18%       Automatic implantable cardioverter-defibrillator - [employer] single lead ICD, Not Dependent 08/18/2015     Priority: Medium     Health Care Home 01/19/2015     Priority: Medium     *See Letters for [facility] Care Plan: Emergency Care Plan         CKD (chronic kidney disease) stage 3, GFR 30-59 ml/min 01/28/2013     Priority: Medium     CHF (NYHA class II, ACC/AHA stage C) (H) 08/15/2012     Priority: Medium     Hypertension goal BP (blood pressure) < 140/90 01/21/2011     Priority: Medium     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) 10/31/2010     Priority: Medium     Goal <8%       Hyperlipidemia LDL goal <100 10/31/2010     Priority: Medium     Coronary artery disease involving nonautologous biological coronary bypass graft  with angina pectoris (H) 03/15/2010     Priority: Medium     MI and open heart with 1 stent placed in 2008; another minor MI in 2009.  MI on 2/19/15. Coronary angiogram from [facility] on 2/19/15 showed severe 3 vessel native CAD (all three vessels [LAD, LCx and RCA] reported to be 100% ocludded at their ostia). All his grafts were considered to be occluded except for LIMA-to-LAD, which was patent and supplied left-to-left, as well as, left-to-right collaterals. There were no targets suitable for revascularization and patient was put on medical therapy.        Obesity 09/26/2016     Priority: Low       PAST MEDICAL HISTORY:  Past Medical History:   Diagnosis Date     CAD (coronary artery disease)      CHF (congestive heart failure) (H)      CKD (chronic kidney disease), stage III      Cortical cataract of both eyes      Diabetes (H)      Hyperlipidemia      Hypertension      Ischemic cardiomyopathy      Obesity      CHANTEL (obstructive sleep apnea)     occas cpap     Osteoarthritis        CURRENT MEDICATIONS:  Prescription Medications as of 1/5/2021       Rx Number Disp Refills Start End Last Dispensed Date Next Fill Date Owning Pharmacy    acetaminophen (TYLENOL) 325 MG tablet  60 tablet 3 9/2/2020    [facility] [place], [place] - [address] [place] [address] [place]    Sig: Take 3 tablets (975 mg) by mouth every 8 hours as needed for mild pain    Class: E-Prescribe    Route: Oral    Alcohol Swabs PADS  100 each 0 8/3/2020    [address]    Sig: Use to swab the area of the injection or sergio as directed   Per insurance coverage    Class: Local Print    aspirin (ASA) 81 MG chewable tablet  120 tablet 0 10/6/2020    [facility] [place], [place] - [address] [place] [address]    Sig: Take 1 tablet (81 mg) by mouth daily    Class: E-Prescribe    Route: Oral    atropine 1 % ophthalmic solution            Sig: Place 1 drop  Into the left eye daily    Class: Historical    Route: Left Eye    blood glucose (NO BRAND SPECIFIED) test strip  200 strip 3 1/2/2021    [facility] [address]    Sig: Use to test blood sugar 2 times daily or as directed.    Class: E-Prescribe    blood glucose monitoring (ACCU-CHEK FELIPE SMARTVIEW) meter device kit  1 kit 0 2/20/2017    [facility] [place] - [address] [place] [address]    Sig: Use to test blood sugar 3-4 times daily, as directed.    Class: E-Prescribe    blood glucose monitoring (ONE TOUCH DELICA) lancets  200 each 3 11/25/2019    [id number] [address] AT [address]    Sig: Use to test blood sugars 2 times daily.    Class: E-Prescribe    calcium carbonate 600 mg-vitamin D 400 units (CALTRATE) 600-400 MG-UNIT per tablet  180 tablet 3 8/18/2020    [facility] [address]    Sig: Take 1 tablet by mouth 2 times daily (with meals)    Class: E-Prescribe    Route: Oral    Continuous Blood Gluc Sensor (FREESTYLE JEREMY 14 DAY SENSOR) [facility]  2 each 11 8/28/2020    [facility]    Sig: Change every 14 days.    Class: Local Print    Continuous Blood Gluc Sensor (FREESTYLE JEREMY 14 DAY SENSOR) [facility]  6 each 4 8/18/2020    [id number] [address] AT [address]    Sig: Change every 14 days.    Class: E-Prescribe    Notes to Pharmacy: Use to check BG 5 times daily.    furosemide (LASIX) 20 MG tablet  90 tablet 3 10/12/2020    [facility] [address]    Sig: Take 1 tablet (20 mg) by mouth daily    Class: E-Prescribe    Route: Oral    HUMALOG KWIKPEN 100 UNIT/ML soln  90 mL 1 11/10/2020    [facility] #[id number] [facility] [address] AT [address]    Sig: Inject 0-31 Units Subcutaneous 3 times daily (with meals) + Custom MEAL and  SNACK corrective dosing   Approx 90 units daily max    Class: E-Prescribe    Cosign for Ordering: Accepted by Marisabel Prieto PA-C on 2020 10:04 PM    hydrALAZINE (APRESOLINE) 100 MG tablet  270 tablet 3 2020    [facility]/[address]     Sig: Take 1 tablet (100 mg) by mouth every 8 hours    Class: E-Prescribe    Route: Oral    insulin aspart (NOVOLOG PEN) 100 UNIT/ML pen  6 mL 0 10/5/2020    [address]    Sig: Inject 0-31 Units Subcutaneous 3 times daily (with meals) Custom MEAL and SNACK corrective dosing     BG less than 80, do not give Novolog.  BG , give  15 units.  -169, give  17 units.  -199 give 19 units.  -229 give 21 units.  -259 give 23 units.  -289 give 25 units.  -319 give 27 units.  -349 give 29 units.  BG >/= 350 give 31 units.  To be given with meal based on pre-meal blood glucose    Class: Local Print    Route: Subcutaneous    insulin isophane human (HUMULIN N PEN) 100 UNIT/ML injection    2020    [id number] - [place] [address] AT [facility] [address]    Sig: Inject 35 Units Subcutaneous every morning    Class: No Print Out    Route: Subcutaneous    insulin isophane human (HUMULIN N PEN) 100 UNIT/ML injection  3 mL 1 10/5/2020    [address]    Sig: Inject 8 Units Subcutaneous At Bedtime    Class: E-Prescribe    Route: Subcutaneous    insulin pen needle (32G X 4 MM) 32G X 4 MM miscellaneous  100 each 0 8/3/2020    [address]    Sig: Use as directed by provider  Per insurance coverage    Class: Local Print    insulin pen needle (B-D U/F) 31G X 5 MM miscellaneous  100 each 3 2020    [facility] [place] [address]    Si Units by  Device route 2 times daily Use 2 pen needles daily or as directed.    Class: E-Prescribe    Route: Device    insulin pen needle (NOVOFINE) 30G X 8 MM miscellaneous  200 each 3 8/18/2020    [id number] - [facility] [address] AT [facility] [address]    Sig: Inject 1 Box Subcutaneous 6 times daily Use 6 pen needles daily or as directed.    Class: E-Prescribe    Notes to Pharmacy: Has both Novolog Flex pen and several Humalog Kwik pen, but doesn't have correct needle.    Route: Subcutaneous    magnesium oxide 400 MG CAPS  180 capsule 3 9/22/2020    [facility] [place] [address]    Sig: Take 400 mg by mouth 2 times daily    Class: E-Prescribe    Route: Oral    mycophenolate (GENERIC EQUIVALENT) 250 MG capsule  240 capsule 11 11/27/2020    [facility] [place] [address] [address]    Sig: Take 4 capsules (1,000 mg) by mouth 2 times daily    Class: E-Prescribe    Notes to Pharmacy: TXP DT 7/19/2020 (Heart) TXP Dischg DT  DX Heart replaced by transplant Z94.1 TX Center [facility] ([place], [place])    Route: Oral    neomycin-polymixin-dexamethasone (MAXITROL) 0.1 % ophthalmic suspension  5 mL 0 12/21/2020    [facility] [place], [place] - [address]    Sig: Apply 1 drop to eye 4 times daily Instill into operative eye(s) per physician instructions.    Class: E-Prescribe    Route: Ophthalmic    neomycin-polymixin-dexamethasone (MAXITROL) ophthalmic ointment            Sig: Place 1 Application Into the left eye At Bedtime    Class: Historical    Route: Left Eye    order for DME  1 Units 0 1/11/2017        Sig: Auto CPAP 8-15 cmH20    Class: [facility] Center    pantoprazole (PROTONIX) 40 MG EC tablet    11/6/2020    [facility] [place] [address] [address]    Class: Historical    polyethylene glycol (MIRALAX) 17 g packet  7 packet 0 8/28/2020     [address]    Si g by Oral or Feeding Tube route daily as needed for constipation    Class: E-Prescribe    Route: Oral or Feeding Tube    rosuvastatin (CRESTOR) 10 MG tablet  90 tablet 3 2020    [facility]/[address]    Sig: Take 1 tablet (10 mg) by mouth daily    Class: E-Prescribe    Route: Oral    senna-docusate (SENOKOT-S/PERICOLACE) 8.6-50 MG tablet  60 tablet 0 10/5/2020    [address]    Sig: Take 1 tablet by mouth 2 times daily (hold for loose stools)    Class: E-Prescribe    Route: Oral    sulfamethoxazole-trimethoprim (BACTRIM) 400-80 MG tablet  90 tablet 3 2020    [facility]/[address]    Sig: Take 1 tablet by mouth daily    Class: E-Prescribe    Route: Oral    tacrolimus (GENERIC EQUIVALENT) 1 MG capsule  280 capsule 11 2020    [facility]/[address]    Sig: Take 5 capsules (5 mg) by mouth every morning AND 4 capsules (4 mg) every evening.    Class: E-Prescribe    Notes to Pharmacy: TXP DT 2020 (Heart) TXP Dischg DT  DX Heart transplant Z94.1 TX Center [facility] [place]    Route: Oral    tamsulosin (FLOMAX) 0.4 MG capsule  60 capsule 0 10/6/2020    [address]    Sig: Take 1 capsule (0.4 mg) by mouth daily    Class: E-Prescribe    Route: Oral      Clinic-Administered Medications as of 2021       Dose Frequency Start End    bevacizumab (AVASTIN) intravitreal inj 1.25 mg 1.25 mg EVERY 28 DAYS 12/10/2020 2021    Admin Instructions: Prn every 3-52 weeks<BR>MAYE Rodriguez <BR>[phone]<BR>re    Route: Intravitreal          ROS:   Constitutional: No fever, chills, or sweats. Stable weight.   ENT: As per HPI.  "  Allergies/Immunologic: Negative.   Respiratory: As per HPI.  Cardiovascular: As per HPI.   GI: No nausea, vomiting, hematemesis, melena, or hematochezia.   : No urinary frequency, dysuria, or hematuria.   Integument: Negative.   Psychiatric: Negative.   Neuro: Negative.   Endocrinology: Negative.   Musculoskeletal: Negative    Exam:  /75 (BP Location: Right arm, Patient Position: Chair, Cuff Size: Adult Large)   Pulse 89   Ht 1.651 m (5' 5\")   Wt 82.4 kg (181 lb 9.6 oz)   SpO2 97%   BMI 30.22 kg/m    In general, the patient is a pleasant male in no apparent distress.    HEENT: Wearing black sunglasses.  Neck:  No adenopathy, No thyromegaly.    COR: No jugular venous distention when sitting upright.  RRR.  Normal S1 S2 splits physiologically.  No murmur, rub click, or gallop.    Lungs:  CTA. No rhonchi.    Abdomen: soft, nontender, nondistended.  No organomegaly.  Extremities:  No clubbing or cyanosis, moderate bilateral LE edema with pitting to mid-calf.  Neuro: Alert & Oriented x 3, grossly non focal.  Integument: no open lesions, rashes, or jaundice.    Labs:  CBC RESULTS:   Lab Results   Component Value Date    WBC 4.3 01/05/2021    RBC 4.31 (L) 01/05/2021    HGB 12.2 (L) 01/05/2021    HCT 38.9 (L) 01/05/2021    MCV 90 01/05/2021    MCH 28.3 01/05/2021    MCHC 31.4 (L) 01/05/2021    RDW 15.1 (H) 01/05/2021     01/05/2021       BMP RESULTS:  Lab Results   Component Value Date     01/05/2021    POTASSIUM 4.3 01/05/2021    CHLORIDE 107 01/05/2021    CO2 25 01/05/2021    ANIONGAP 7 01/05/2021     (H) 01/05/2021    BUN 28 01/05/2021    CR 1.68 (H) 01/05/2021    GFRESTIMATED 41 (L) 01/05/2021    GFRESTBLACK 48 (L) 01/05/2021    BRYANNA 9.2 01/05/2021      LIPID RESULTS:  Lab Results   Component Value Date    CHOL 133 01/05/2021    HDL 40 01/05/2021    LDL 58 01/05/2021    TRIG 179 (H) 01/05/2021    CHOLHDLRATIO 2.6 06/27/2015    NHDL 94 01/05/2021       IMMUNOSUPPRESSANT LEVELS  Lab " Results   Component Value Date    TACROL 8.3 12/07/2020    DOSTAC Not Provided 12/07/2020       No components found for: CK  Lab Results   Component Value Date    MAG 1.9 01/05/2021     Lab Results   Component Value Date    A1C 5.9 (H) 12/07/2020     Lab Results   Component Value Date    PHOS 3.2 01/05/2021     Lab Results   Component Value Date    NTBNPI 8,792 (H) 09/22/2020     Lab Results   Component Value Date    SAITESTMET SA FCS 10/12/2020    SAICELL Class I 10/12/2020    [id number] None 10/12/2020    [id number] None 10/12/2020    SAIREPCOM  10/12/2020      Test performed by modified procedure. Serum heat inactivated and tested   by a modified ([place]) protocol including fetal calf serum addition.   High-risk, mfi >3,000. Mod-risk, mfi 500-3,000.       Lab Results   Component Value Date    SAIITESTME SA FCS 10/12/2020    SAIICELL Class II 10/12/2020    [id number] None 10/12/2020    [id number] None 10/12/2020    SAIIREPCOM  10/12/2020      Test performed by modified procedure. Serum heat inactivated and tested   by a modified ([place]) protocol including fetal calf serum addition.   High-risk, mfi >3,000. Mod-risk, mfi 500-3,000.       Lab Results   Component Value Date    CSPEC Plasma 10/20/2020       Assessment and Plan:  Mr. Lucian Henderson is a 65yr old male with a history of CAD (s/p 3v CABG 2008), ICM s/p OHT 7/19/20, DMII, CKD, and HTN who presents to clinic for routine follow up.    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  He ultimately discharged 8/3.    He has been readmitted for hyperglycemia (8/2020) and wound infection (s/p I&D 9/23/20).  He  has been following with the eye clinic, as noted below.      His biopsies have remained negative for rejection.  He has no DSAs.  Last AlloMap score 12/2020 was 32.  Baseline coronary angiogram has been deferred.  Last TTE 10/2020 showed normal graft function, with LVEF 55-60% and normal RV size/function.  Last RHC 12/2020 showed normal biventricular filling pressures, with RA 3, mPA 18, PCW 8, and CI 3.08.    Labs today show stable electrolytes, renal function, liver function, and blood counts.  FLP shows elevated triglycerides.  CMV, EBV, DSAs, tacro levels are in process.  He will have an echo and RHC/bx following this appt.    Mr. Tee Henderson appears well today.  His weight remains stable, and he appears euvolemic.  Will eval his RHC numbers today, and determine if we can stop his lasix.    His BPs remain stable.    As his triglycerides are elevated, discussed increasing aerobic activity and heart-healthy dieting.  Will repeat a FLP with his annual surveillance, and will determine if additional treatment is necessary at that time.      Serostatus:  - CMV D+/R+  - EBV D+/R+  - Toxo D+/R-    Immunosuppression:  - MMF 1000mg twice daily  - tacro, decrease goal level to 6-8 once he reaches 6m post transplant.  Level today in process.    PPx:  - CAV:  Aspirin 81mg daily and rosuvastatin 10mg daily  - GI:  Pantoprazole 40mg daily  - Osteoporosis:  Calcium/vitamin D supplements  - Toxo:  Single strength bactrim, lifelong ppx    Graft function:  - BPs:  Stable, continue hydral 100mg TID  - fluid status:  Euvolemic, taking lasix 20mg daily.  Will re-eval lasix pending RHC results today.    Health maintenance:  - received flu shot  - needs baseline coronary angiogram --> will d/w Dr. Sheridan.      DMII  Follows with endo and PCP.    HIT  HIT antibody + 7/2020.  CORRINE +.  No heparin products.    RIJ and axillary vein DVT, nonocclusive  Right cephalic vein occlusive thrombus  S/p course of anticoagulation.    Total  "retinal detachment of the left eye, s/p retinectomy 12/21/20  Proliferative diabetic retinopathy  Right vitreous hemorrhage  He presented to the ED 12/9 with right vision loss.  Ophthalmology was consulted, and found that he had a vitreous hemorrhage in his right eye and total retinal detachment of his left eye.  He was then seen in the eye clinic 12/10, where he was advised an Avastin injection in the right eye (for proliferative diabetic retinopathy and vitreous hemorrhage).  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      The above was reviewed with Mr. Tee Henderson via professional .  He verbalized understanding and will call with further questions/concerns.    45 minutes spent with patient, along with preparing for visit, reviewing follow up, and completing documentation.      Arlin Guajardo DNP, FNP-BC, CHFN  Advanced Heart Failure Nurse Practitioner  [facility]  Patient Care Team:  No Ref-Primary, Physician as PCP - Diane Groves APRN CNP as Nurse Practitioner (Cardiology)  Arvin Sheridan MD as MD (Cardiology)  Yue Dowd MD as MD (INTERNAL MEDICINE - ENDOCRINOLOGY, DIABETES & METABOLISM)  Christelle Pettit, RN as Transplant Coordinator (Cardiology)  Negrito Rai [facility] as Pharmacist (Pharmacist)  [facility] ([facility] ([facility] ([place]  Denita Bueno MD as Assigned Palliative Care Provider  Triny Bunch MD as Assigned Surgical Provider  Anna Archuleta PA-C as Assigned PCP  SELF, REFERRED  "

## 2021-01-05 NOTE — PROGRESS NOTES
[facility]   Interventional Cardiology        Consenting/Education for Right Heart Catheterization/Endomyocardial Biopsy     Patient understands as a part of routine surveillance after heart transplant we will perform a right heart catheterization/endomyocardial biopsy.  This procedure will be performed by Dr. Hudson.    Patient understands during the portion for right heart catheterization a fine tube (catheter) is put into the vein of the groin/neck.  It is carefully passed along until it reaches the heart and then goes up into the blood vessels of the lungs. This is done to measure a variety of pressures in your heart and can tell us how well the heart is filling and emptying, as well as monitor fluid status. While the catheter is in your neck/groin vein, a  Biopsy forceps  or pincers at the end of the catheter are used to take the tissue samples. This is may be repeated to get enough samples. The biopsy pieces are very small (one to two millimeters).     Patient also understands risks and complications of the procedure which include, but are not limited to bruising around the incision site, infection, bleeding, and allergic reaction to local anesthetic.      Patient verbalized understanding of risks and benefits of the right heart catheterization and endomyocardial biopsy and has elected to proceed with the procedure.       Shelia Marinelli, SONA APRN CNP   [facility] Interventional Cardiology

## 2021-01-05 NOTE — NURSING NOTE
Transplant Coordinator Note    Reason for visit: 6 month visit  Coordinator: Present   Caregiver:  none today    Health concerns addressed today:  1. Recently had surgery for retinal detachment.  2.   3.       Immunosuppressants:  MMF 1000 BID  Tacro 5/4         Labs:   Creat improved to 1.68.  Electrolytes normal.  Triglycerides elevated    Additional Notes:         Labs reviewed with patient  Medication record reviewed and reconciled  Questions and concerns addressed  Pt verbalized an understanding of plan of care.     Patient Instructions  1. Continue to wear the compression stockings to help with leg swelling.  2. Work on diet and exercise to decrease your triglyceride levels.  3. Christelle will call you with all lab and testing results from today.    Next transplant clinic appointment:  In two months for 8 month visit.  Next lab draw: TBD  Coordinator will call with all pending results.     Please call transplant coordinator with any questions:    Christelle Pettit RN BSN   Post Heart Transplant Nurse Coordinator  [facility]  Questions: [phone]

## 2021-01-06 ENCOUNTER — TELEPHONE (OUTPATIENT)
Dept: TRANSPLANT | Facility: CLINIC | Age: 66
End: 2021-01-06

## 2021-01-06 DIAGNOSIS — Z94.1 HEART TRANSPLANT, ORTHOTOPIC, STATUS (H): ICD-10-CM

## 2021-01-06 LAB
CMV DNA SPEC NAA+PROBE-ACNC: NORMAL [IU]/ML
CMV DNA SPEC NAA+PROBE-LOG#: NORMAL {LOG_IU}/ML
COPATH REPORT: NORMAL
EBV DNA # SPEC NAA+PROBE: NORMAL {COPIES}/ML
EBV DNA SPEC NAA+PROBE-LOG#: NORMAL {LOG_COPIES}/ML
SPECIMEN SOURCE: NORMAL

## 2021-01-07 ENCOUNTER — APPOINTMENT (OUTPATIENT)
Dept: INTERPRETER SERVICES | Facility: CLINIC | Age: 66
End: 2021-01-07
Payer: COMMERCIAL

## 2021-01-07 DIAGNOSIS — Z94.1 HEART TRANSPLANT, ORTHOTOPIC, STATUS (H): Primary | ICD-10-CM

## 2021-01-07 LAB
ALLOMAP SCORE (EXTERNAL): 35
IMMUKNOW IMMUNE CELL FUNCTION: 281 NG/ML
NEGATIVE PREDICTIVE VALUE PERCENT (EXTERNAL): 98.1 %
POSITIVE PREDICTIVE VALUE PERCENT (EXTERNAL): 5.7 %

## 2021-01-07 RX ORDER — TACROLIMUS 1 MG/1
CAPSULE ORAL
Qty: 300 CAPSULE | Refills: 11 | Status: SHIPPED | OUTPATIENT
Start: 2021-01-07 | End: 2021-10-01

## 2021-01-08 DIAGNOSIS — E11.3513 TYPE 2 DIABETES MELLITUS WITH PROLIFERATIVE RETINOPATHY OF BOTH EYES AND MACULAR EDEMA, UNSPECIFIED WHETHER LONG TERM INSULIN USE (H): Primary | ICD-10-CM

## 2021-01-08 LAB
DONOR IDENTIFICATION: NORMAL
DSA COMMENTS: NORMAL
DSA PRESENT: NO
DSA TEST METHOD: NORMAL
ORGAN: NORMAL
SA1 CELL: NORMAL
SA1 COMMENTS: NORMAL
SA1 HI RISK ABY: NORMAL
SA1 MOD RISK ABY: NORMAL
SA1 TEST METHOD: NORMAL
SA2 CELL: NORMAL
SA2 COMMENTS: NORMAL
SA2 HI RISK ABY UA: NORMAL
SA2 MOD RISK ABY: NORMAL
SA2 TEST METHOD: NORMAL
UNACCEPTABLE ANTIGEN: NORMAL
UNOS CPRA: 0

## 2021-01-14 ENCOUNTER — OFFICE VISIT (OUTPATIENT)
Dept: OPHTHALMOLOGY | Facility: CLINIC | Age: 66
End: 2021-01-14
Attending: OPHTHALMOLOGY
Payer: COMMERCIAL

## 2021-01-14 DIAGNOSIS — Z79.4 TYPE 2 DIABETES MELLITUS WITH HYPERGLYCEMIA, WITH LONG-TERM CURRENT USE OF INSULIN (H): ICD-10-CM

## 2021-01-14 DIAGNOSIS — Z94.1 HEART TRANSPLANT, ORTHOTOPIC, STATUS (H): ICD-10-CM

## 2021-01-14 DIAGNOSIS — E11.3513 TYPE 2 DIABETES MELLITUS WITH PROLIFERATIVE RETINOPATHY OF BOTH EYES AND MACULAR EDEMA, UNSPECIFIED WHETHER LONG TERM INSULIN USE (H): ICD-10-CM

## 2021-01-14 DIAGNOSIS — E11.65 TYPE 2 DIABETES MELLITUS WITH HYPERGLYCEMIA, WITH LONG-TERM CURRENT USE OF INSULIN (H): ICD-10-CM

## 2021-01-14 DIAGNOSIS — H40.051 BORDERLINE GLAUCOMA OF RIGHT EYE WITH OCULAR HYPERTENSION: ICD-10-CM

## 2021-01-14 DIAGNOSIS — Z48.810 AFTERCARE FOLLOWING SURGERY OF A SENSORY ORGAN: ICD-10-CM

## 2021-01-14 DIAGNOSIS — H40.051 BORDERLINE GLAUCOMA OF RIGHT EYE WITH OCULAR HYPERTENSION: Primary | ICD-10-CM

## 2021-01-14 LAB
ANION GAP SERPL CALCULATED.3IONS-SCNC: 4 MMOL/L (ref 3–14)
BUN SERPL-MCNC: 28 MG/DL (ref 7–30)
CALCIUM SERPL-MCNC: 9.2 MG/DL (ref 8.5–10.1)
CHLORIDE SERPL-SCNC: 106 MMOL/L (ref 94–109)
CO2 SERPL-SCNC: 28 MMOL/L (ref 20–32)
CREAT SERPL-MCNC: 1.69 MG/DL (ref 0.66–1.25)
GFR SERPL CREATININE-BSD FRML MDRD: 41 ML/MIN/{1.73_M2}
GLUCOSE SERPL-MCNC: 145 MG/DL (ref 70–99)
HBA1C MFR BLD: 6.7 % (ref 0–5.6)
POTASSIUM SERPL-SCNC: 4.8 MMOL/L (ref 3.4–5.3)
SODIUM SERPL-SCNC: 137 MMOL/L (ref 133–144)
TACROLIMUS BLD-MCNC: 6.7 UG/L (ref 5–15)
TME LAST DOSE: NORMAL H

## 2021-01-14 PROCEDURE — G0463 HOSPITAL OUTPT CLINIC VISIT: HCPCS | Mod: 25

## 2021-01-14 PROCEDURE — 80048 BASIC METABOLIC PNL TOTAL CA: CPT | Performed by: PATHOLOGY

## 2021-01-14 PROCEDURE — 92134 CPTRZ OPH DX IMG PST SGM RTA: CPT | Performed by: OPHTHALMOLOGY

## 2021-01-14 PROCEDURE — 83036 HEMOGLOBIN GLYCOSYLATED A1C: CPT | Performed by: PATHOLOGY

## 2021-01-14 PROCEDURE — 80197 ASSAY OF TACROLIMUS: CPT | Performed by: PATHOLOGY

## 2021-01-14 PROCEDURE — 67028 INJECTION EYE DRUG: CPT | Mod: RT | Performed by: OPHTHALMOLOGY

## 2021-01-14 PROCEDURE — 99024 POSTOP FOLLOW-UP VISIT: CPT | Mod: GC | Performed by: OPHTHALMOLOGY

## 2021-01-14 PROCEDURE — 250N000011 HC RX IP 250 OP 636: Performed by: OPHTHALMOLOGY

## 2021-01-14 PROCEDURE — 36415 COLL VENOUS BLD VENIPUNCTURE: CPT | Performed by: PATHOLOGY

## 2021-01-14 RX ORDER — LATANOPROST 50 UG/ML
SOLUTION/ DROPS OPHTHALMIC
Qty: 15 ML | Refills: 4 | Status: SHIPPED | OUTPATIENT
Start: 2021-01-14 | End: 2021-11-03

## 2021-01-14 RX ORDER — LATANOPROST 50 UG/ML
1 SOLUTION/ DROPS OPHTHALMIC AT BEDTIME
Qty: 1 BOTTLE | Refills: 11 | Status: SHIPPED | OUTPATIENT
Start: 2021-01-14 | End: 2021-01-14

## 2021-01-14 RX ORDER — PREDNISOLONE ACETATE 10 MG/ML
1-2 SUSPENSION/ DROPS OPHTHALMIC 2 TIMES DAILY
Qty: 1 BOTTLE | Refills: 1 | Status: SHIPPED | OUTPATIENT
Start: 2021-01-14 | End: 2023-10-09 | Stop reason: DRUGHIGH

## 2021-01-14 RX ADMIN — Medication 1.25 MG: at 10:05

## 2021-01-14 ASSESSMENT — EXTERNAL EXAM - RIGHT EYE: OD_EXAM: NORMAL

## 2021-01-14 ASSESSMENT — TONOMETRY
IOP_METHOD: TONOPEN
OS_IOP_MMHG: 21
OD_IOP_MMHG: 24

## 2021-01-14 ASSESSMENT — VISUAL ACUITY
METHOD: SNELLEN - LINEAR
OS_SC: CF @ 3'
OD_SC: 20/50

## 2021-01-14 ASSESSMENT — EXTERNAL EXAM - LEFT EYE: OS_EXAM: NORMAL

## 2021-01-14 ASSESSMENT — SLIT LAMP EXAM - LIDS
COMMENTS: PTOSIS OD>OS
COMMENTS: PTOSIS OD>OS

## 2021-01-14 NOTE — NURSING NOTE
Chief Complaints and History of Present Illnesses   Patient presents with     Post Op (Ophthalmology) Left Eye     Chief Complaint(s) and History of Present Illness(es)     Post Op (Ophthalmology) Left Eye     Laterality: left eye    Associated symptoms: floaters.  Negative for flashes and eye pain    Response to treatment: mild improvement    Pain scale: 0/10              Comments     3 week status post PPV/MP/retinectomy 12/21/20  Blood sugar 120 this am   Vision is getting better NEIL Kumar COA 9:18 AM January 14, 2021

## 2021-01-14 NOTE — PROGRESS NOTES
Postoperative week 3 status post PPV/MP/retinectomy 12/21/20. 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 Denies eye pain.    Patient states vision improved in left eye  Doing well    Optical Coherence Tomography:   Right eye: good foveal contour  Left eye: mild Epiretinal membrane; nasal edema; inferior area of subretinal fluid      Plan:  Silveira shield at all times  Retina detachment and endophthalmitis precautions were discussed with the patient and was asked to return if any of the those occur    I again had an extensive discussion about guarded prognosis of left eye. Patient was found to have funnel RD that despite best efforts, was not able to be . Nonetheless, the temporal folded retina appears flat on exam today and patient is happy with the improved.     Medications to operative eye left eye   Start latanoprost at bedtime both eyes   Predforte  Twice a day x 1 week then once a day left eye     Follow up in 1 month for Post op check left eye and exam + likely avastin injection right eye   Will need Panretinal laser photocoagulation (PRP) filling right eye   Patricia Hatfield  PGY3     ~~~~~~~~~~~~~~~~~~~~~~~~~~~~~~~~~~   Complete documentation of historical and exam elements from today's encounter can be found in the full encounter summary report (not reduplicated in this progress note).  I personally obtained the chief complaint(s) and history of present illness.  I confirmed and edited as necessary the review of systems, past medical/surgical history, family history, social history, and examination findings as documented by others; and I examined the patient myself.  I personally reviewed the relevant tests, images, and reports as documented above.  I formulated and edited as necessary the assessment and plan and discussed the findings and management plan with the patient and family    Darryl Kang MD  Vitreoretinal Surgery Fellow  [facility]

## 2021-01-14 NOTE — PATIENT INSTRUCTIONS
Prednisolone (pink or white top) 2 x day x 1 week, then 1 x day x 1 week and stop  Lumigan (teal top) at bedtime in both eyes

## 2021-01-15 ENCOUNTER — APPOINTMENT (OUTPATIENT)
Dept: INTERPRETER SERVICES | Facility: CLINIC | Age: 66
End: 2021-01-15
Payer: COMMERCIAL

## 2021-01-15 ENCOUNTER — TELEPHONE (OUTPATIENT)
Dept: TRANSPLANT | Facility: CLINIC | Age: 66
End: 2021-01-15

## 2021-01-15 NOTE — TELEPHONE ENCOUNTER
Medication: LATANOPROST 0.005% OPHTH SOLN 2.5ML    Requested directions: Place 1 drop into both eyes At Bedtime - Both Eyes  Current directions on the medication list: Place 1 drop into both eyes At Bedtime - Both Eyes     Last Written Prescription Date:  1/11/2021  Last Fill Quantity: 1 bottle ,   # refills: 11  [id number] - [place], [place] - [address] AT [facility] [address]   requested change to 90- day supply sent.  Last appt 1/13/2021 Steve

## 2021-01-15 NOTE — TELEPHONE ENCOUNTER
Tacrolimus level 6.7.  Goal 6-8.  No dose changes at this time.  Results called to pt using .  Pt states understanding.

## 2021-01-28 ENCOUNTER — TELEPHONE (OUTPATIENT)
Dept: TRANSPLANT | Facility: CLINIC | Age: 66
End: 2021-01-28

## 2021-01-28 NOTE — TELEPHONE ENCOUNTER
Pt's daughter, Elisabeth, called with questions about the Covid 19 vaccine.  She is wondering when/if pt can receive it.  Explained to Elisabeth that we do not know yet when [place] will begin vaccinating patients.  It is OK for pt to receive vaccine if it becomes available to him from another clinic.  Will update patients when we learn more about vaccine availability.  Elisabeth states understanding and was appreciative of information.

## 2021-01-29 ENCOUNTER — APPOINTMENT (OUTPATIENT)
Dept: INTERPRETER SERVICES | Facility: CLINIC | Age: 66
End: 2021-01-29
Payer: COMMERCIAL

## 2021-01-29 NOTE — PROGRESS NOTES
Lucian is a 65 year old who is being evaluated via a billable VIDEO visit.      What phone number would you like to be contacted at? [phone] - Whats Gaby patient request.    How would you like to obtain your AVS? Mail a copy  Phone call duration: 28 minutes    Platform used for Video Visit: Other: Whats Gaby per patient request        Diabetes Virtual Consult Note  Marisabel Prieto PA-C  February 2, 2021      Lucian Dalearza Sr. is a 65 year old male with a past medical history including  has a past medical history of CAD (coronary artery disease), CHF (congestive heart failure) (H), CKD (chronic kidney disease), stage III, Cortical cataract of both eyes, Diabetes (H), Hyperlipidemia, Hypertension, Ischemic cardiomyopathy, Obesity, CHANTEL (obstructive sleep apnea), and Osteoarthritis. He also has no past medical history of Chronic infection, Complication of anesthesia, COPD (chronic obstructive pulmonary disease) (H), Heart murmur, Irregular heart beat, Liver disease, Other chronic pain, or Uncomplicated asthma.  He is  s/p heart transplant on 7/19/2020 and left eye retinectomy on 12/21/20.      Lucian was again admitted to [facility] for hyperglycemia 9/22 - 10/5/2020 was again admitted due to high BG.  Hospital discharge notes Increased BS since transplant due to prednisone. We have worked together since and when we last spoke November 2020, he was doing well at home with NPH 35 qam and 8 qpm with 15 units /meal and 2u:30 >140 with custom printed sliding scale insulin:    Inject 0-31 Units Subcutaneous 3 times daily (with meals) Custom MEAL and SNACK corrective dosing     BG less than 80, do not give Novolog.  BG , give  15 units.  -169, give  17 units.  -199 give 19 units.  -229 give 21 units.  -259 give 23 units.  -289 give 25 units.  -319 give 27 units.  -349 give 29 units.  BG >/= 350 give 31 units.        Interim:  Lucian tells me that he is doing well.  He has  not been vaccinated or contacted about this and would like to be vaccinated as soon as possible.  .      They have decreased NPH 30 in the morning when see low BG like 114 in the morning.  Shivani tells me that she is now giving 30 or 35 in the morning and in the night gives 12 or 10 units NPH depending on how high the BG is.    They are now giving short acting insulin with breakfast, just lunch as prescribed.  With dinner giving 10 - 15 units if BG is high.      Last BG check is at 8 or 8:3-0 and had coffee and bread or sweet potato.  If high she puts a bit of short action 12- 15 .  When gave 20 or more was low overnight.      They are not going out, even to exercise.  He does 30 minutes of treadmill daily and then 15 minutes of bike later with some weights every day.  Feels good with this; no worrisome symptoms, no low blood sugars with activity, chest pain or pressure, lightheaded, ALBRIGHT or abdominal pain.     He underwent left eye retinectomy in December and tells me vision in his left eye is improved.      A1C on 1/14 6.7, Hgb on 1/5/21 just 12.2 so A1C likely under representing glycemic levels .      We reviewed glucometer, pump and CGMS data together.  It revealed:    Glucose Readings:  Week of__/__/__ Date  __/_29_  Date  __/_28_ Date  __/_27_ Date  __/_26_ Date  __/_25_ Date  __/_24_ Date  __/_23_    Time BG Time BG Time BG Time BG Time BG Time BG Time BG      139  145  109  94  115  88    NOON   151  181  185  174  129  145    Even                 PM   161  189  175  191  189  165     Week of__/__/__ Date  __/_22_  Date  __/_21_ Date  __/_20_ Date  __/_19_ Date  __/_18_ Date  __/_17_ Date  __/_16_    Time BG Time BG Time BG Time BG Time BG Time BG Time BG      115  117  114  91  103  115    NOON 157  165  160  155  150  120  14  3    Even                   175  185  190  161  155  160     More BG >180 later in the day than previous.  Fasting BG at goal.      History of Diabetes monitoring and  complications/ prevention:  CAD: Yes  Last eye exam results: 09/18/2018  Last dental exam: Denies concerns, plans post COVID.  Did do over the summer.   Microalbuminuria: 6 October 2018  HTN: Yes  On Statin: Yes  On Aspirin: Yes  Depression: No - worried, but happy. Hopeful.    ED: yes, limited concerned at this time     Janice  has a past medical history of CAD (coronary artery disease), CHF (congestive heart failure) (H), CKD (chronic kidney disease), stage III, Cortical cataract of both eyes, Diabetes (H), Hyperlipidemia, Hypertension, Ischemic cardiomyopathy, Obesity, CHANTEL (obstructive sleep apnea), and Osteoarthritis. He also has no past medical history of Chronic infection, Complication of anesthesia, COPD (chronic obstructive pulmonary disease) (H), Heart murmur, Irregular heart beat, Liver disease, Other chronic pain, or Uncomplicated asthma.  Current Outpatient Medications   Medication     acetaminophen (TYLENOL) 325 MG tablet     Alcohol Swabs PADS     aspirin (ASA) 81 MG chewable tablet     atropine 1 % ophthalmic solution     blood glucose (NO BRAND SPECIFIED) test strip     blood glucose monitoring (ACCU-CHEK FELIPE SMARTVIEW) meter device kit     blood glucose monitoring (ONE TOUCH DELICA) lancets     calcium carbonate 600 mg-vitamin D 400 units (CALTRATE) 600-400 MG-UNIT per tablet     furosemide (LASIX) 20 MG tablet     HUMALOG KWIKPEN 100 UNIT/ML soln     hydrALAZINE (APRESOLINE) 100 MG tablet     insulin aspart (NOVOLOG PEN) 100 UNIT/ML pen     insulin isophane human (HUMULIN N PEN) 100 UNIT/ML injection     insulin isophane human (HUMULIN N PEN) 100 UNIT/ML injection     insulin pen needle (32G X 4 MM) 32G X 4 MM miscellaneous     insulin pen needle (B-D U/F) 31G X 5 MM miscellaneous     insulin pen needle (NOVOFINE) 30G X 8 MM miscellaneous     latanoprost (XALATAN) 0.005 % ophthalmic solution     magnesium oxide 400 MG CAPS     mycophenolate (GENERIC EQUIVALENT) 250 MG capsule      "neomycin-polymixin-dexamethasone (MAXITROL) 0.1 % ophthalmic suspension     neomycin-polymixin-dexamethasone (MAXITROL) ophthalmic ointment     order for DME     pantoprazole (PROTONIX) 40 MG EC tablet     prednisoLONE acetate (PRED FORTE) 1 % ophthalmic suspension     rosuvastatin (CRESTOR) 10 MG tablet     senna-docusate (SENOKOT-S/PERICOLACE) 8.6-50 MG tablet     sulfamethoxazole-trimethoprim (BACTRIM) 400-80 MG tablet     tacrolimus (GENERIC EQUIVALENT) 1 MG capsule     tamsulosin (FLOMAX) 0.4 MG capsule     Continuous Blood Gluc Sensor (FREESTYLE JEREMY 14 DAY SENSOR) MISC     Continuous Blood Gluc Sensor (FREESTYLE JEREMY 14 DAY SENSOR) [facility]     Current Facility-Administered Medications   Medication     bevacizumab (AVASTIN) intravitreal inj 1.25 mg         Lucian's family history includes Diabetes in his brother, sister, and sister.    ROS:   Patient denies any fevers, chills or sweats as well as any changes in or problems with vision, pain or problems with dentition, new or different headaches.  Patient denies symptoms of hypo and hyperglycemia except as above.   Patients denies marked fatigue, cough, shortness of breath, chest pain or pressure.  There has been no pain with or other changes in urination or  itching or pain in genital areas.  Patient denies any noted swelling in feet, ankles or otherwise, loss of sensation or pain  in feet or other areas.    Patient also denies current difficulties with depressed mood, anhedonia or worrying too much.  He is tired of COVID precautions, but feels god and optimistic.        Exam:    PHONE VISIT    Lucian is alerted and oriented.  With Shivani.  Shivani takes notes of new evening sliding scale.  Provides diet and dosing history.    Mood is \"good,\" affect is congruent.  Thoughtful form and content are fluid and coherent.  No signs of distress are appreciated.      He shows me treadmill, stationary bicycle and weights in his living room.      Data:      Most recent:  Lab " Results   Component Value Date    CR 1.77 08/31/2020     Lab Results   Component Value Date     08/31/2020       GFR Estimate   Date Value Ref Range Status   01/14/2021 41 (L) >60 mL/min/[1.73_m2] Final     Comment:     Non  GFR Calc  Starting 12/18/2018, serum creatinine based estimated GFR (eGFR) will be   calculated using the Chronic Kidney Disease Epidemiology Collaboration   (CKD-EPI) equation.     01/05/2021 41 (L) >60 mL/min/[1.73_m2] Final     Comment:     Non  GFR Calc  Starting 12/18/2018, serum creatinine based estimated GFR (eGFR) will be   calculated using the Chronic Kidney Disease Epidemiology Collaboration   (CKD-EPI) equation.     12/09/2020 35 (L) >60 mL/min/[1.73_m2] Final     Comment:     Non  GFR Calc  Starting 12/18/2018, serum creatinine based estimated GFR (eGFR) will be   calculated using the Chronic Kidney Disease Epidemiology Collaboration   (CKD-EPI) equation.         Lab Results   Component Value Date    A1C 6.7 (H) 01/14/2021    A1C 5.9 (H) 12/07/2020    A1C 8.2 (H) 07/03/2020    A1C 7.9 (H) 07/08/2019    A1C 8.5 (H) 03/11/2019    HEMOGLOBINA1 8.3 (A) 08/06/2018    HEMOGLOBINA1 10.6 (A) 04/30/2018     Lab Results   Component Value Date    MICROL 6 10/03/2018     No results found for: MICROALBUMIN  No results found for: CPEPT, GADAB, ISCAB  Cholesterol   Date Value Ref Range Status   01/05/2021 133 <200 mg/dL Final   08/17/2020 118 <200 mg/dL Final     HDL Cholesterol   Date Value Ref Range Status   01/05/2021 40 >39 mg/dL Final   08/17/2020 75 >39 mg/dL Final     LDL Cholesterol Calculated   Date Value Ref Range Status   01/05/2021 58 <100 mg/dL Final     Comment:     Desirable:       <100 mg/dl   08/17/2020 26 <100 mg/dL Final     Comment:     Desirable:       <100 mg/dl     Triglycerides   Date Value Ref Range Status   01/05/2021 179 (H) <150 mg/dL Final     Comment:     Borderline high:  150-199 mg/dl  High:             200-499  mg/dl  Very high:       >499 mg/dl  Fasting specimen     08/17/2020 82 <150 mg/dL Final     Cholesterol/HDL Ratio   Date Value Ref Range Status   06/27/2015 2.6 0.0 - 5.0 Final   01/11/2015 6.0 (H) 0.0 - 5.0 Final           Assessment/Plan:    Lucian is a 65 year old male with  has a past medical history of CAD (coronary artery disease), CHF (congestive heart failure) (H), CKD (chronic kidney disease), stage III, Cortical cataract of both eyes, Diabetes (H), Hyperlipidemia, Hypertension, Ischemic cardiomyopathy, Obesity, CHANTEL (obstructive sleep apnea), and Osteoarthritis. He also has no past medical history of Chronic infection, Complication of anesthesia, COPD (chronic obstructive pulmonary disease) (H), Heart murmur, Irregular heart beat, Liver disease, Other chronic pain, or Uncomplicated asthma.  He is  s/p heart transplant on 7/19/2020 and left eye retinectomy on 12/21/20.    Lucian has type 2 diabetes complicated by ICM and heart transplant, CKD, obesity, retinopathy that is reasonably controlled early in the morning but with BG later in the day above goal.  I suspect some of this is due to varying NPH dose in the morning.      Advised:  NPH 35 units every morning regardless of fasting BG to prevent higher BG later and 11 units each evening.  Increase evening dose if fasting ALWAYS >105.    Continue 15 units Novolog with lunch + sliding scale insulin 2u:30 >140  With dinner give Novolog 10 u  - 200, 12 for  - 229, or 14u for BG >230.    Shivani able to read back and verbalize dosing.      RTC 2 months, sooner prn. , urged to contact me sooner of frequent BG >200, any BG<70.      58 minutes in preparation for visit reviewing chart, labs and documentation, visiting with patient gathering history and in exam from both patient and spouse, education and counseling, as well as coordination of care, further chart review and documentation following visit on this date of service and as alluded to documented  above.        It is my privilege to be involved in the care of the above patient.     Marisabel Prieto PA-C, MPAS  [facility]  Diabetes, Endocrinology, and Metabolism  [phone] Appointments/Nurse  [phone] pager  [phone] nurse line    This note was completed in part using Dragon voice recognition, and may contain word and grammatical errors.

## 2021-01-29 NOTE — PATIENT INSTRUCTIONS
Por favor sigue NPH 35 unidades cada maniana, y 11 each evening.  Si con 11 siempre de maniana es >105, podria aumentar a 12 unidades NPH(larga accion) cada noche.      En la noche:  Si el azucar es menos 170, no ponen Novolog en la tarde  Si es subido,   -199 give 10 units.  -229 give 12 units.  BG > 230 give 14 units.      We appreciate your assistance in coordinating your healthcare.     Please upload your insulin pump, blood sugar meter and/or continuous glucose monitor at home 1-2 days before your next diabetes-related appointment.   This will allow your provider to review your  data before your scheduled virtual visit.    To ask a question to your Endocrine care team, please send them a gestigon message, or reach them by phone at [phone]     To expedite your medication refill(s), please contact your pharmacy and have them   fax a refill request to: [phone].  *Please allow 3 business days for routine medication refills.  *Please allow 5 business days for controlled substance medication refills.    For after-hours urgent Endocrine issues, that do not require 911, please dial [phone], and ask to speak with the Endocrinologist On-Call

## 2021-02-01 DIAGNOSIS — E11.3513 TYPE 2 DIABETES MELLITUS WITH PROLIFERATIVE RETINOPATHY OF BOTH EYES AND MACULAR EDEMA, UNSPECIFIED WHETHER LONG TERM INSULIN USE (H): Primary | ICD-10-CM

## 2021-02-02 ENCOUNTER — VIRTUAL VISIT (OUTPATIENT)
Dept: ENDOCRINOLOGY | Facility: CLINIC | Age: 66
End: 2021-02-02
Payer: COMMERCIAL

## 2021-02-02 DIAGNOSIS — D75.829 HIT (HEPARIN-INDUCED THROMBOCYTOPENIA) (H): ICD-10-CM

## 2021-02-02 DIAGNOSIS — N18.4 CKD (CHRONIC KIDNEY DISEASE) STAGE 4, GFR 15-29 ML/MIN (H): ICD-10-CM

## 2021-02-02 DIAGNOSIS — I50.22 CHRONIC SYSTOLIC HEART FAILURE (H): ICD-10-CM

## 2021-02-02 DIAGNOSIS — D84.9 IMMUNODEFICIENCY, UNSPECIFIED (H): ICD-10-CM

## 2021-02-02 DIAGNOSIS — I25.739 CORONARY ARTERY DISEASE INVOLVING NONAUTOLOGOUS BIOLOGICAL CORONARY BYPASS GRAFT WITH ANGINA PECTORIS (H): ICD-10-CM

## 2021-02-02 DIAGNOSIS — E11.21 TYPE 2 DIABETES MELLITUS WITH DIABETIC NEPHROPATHY, WITH LONG-TERM CURRENT USE OF INSULIN (H): ICD-10-CM

## 2021-02-02 DIAGNOSIS — Z79.4 TYPE 2 DIABETES MELLITUS WITH DIABETIC NEPHROPATHY, WITH LONG-TERM CURRENT USE OF INSULIN (H): ICD-10-CM

## 2021-02-02 DIAGNOSIS — N25.81 SECONDARY RENAL HYPERPARATHYROIDISM (H): ICD-10-CM

## 2021-02-02 DIAGNOSIS — E66.01 MORBID OBESITY (H): ICD-10-CM

## 2021-02-02 PROCEDURE — 99215 OFFICE O/P EST HI 40 MIN: CPT | Mod: 95 | Performed by: PHYSICIAN ASSISTANT

## 2021-02-02 RX ORDER — LANCETS
1 EACH MISCELLANEOUS 4 TIMES DAILY
Qty: 400 EACH | Refills: 11 | Status: SHIPPED | OUTPATIENT
Start: 2021-02-02 | End: 2022-03-29

## 2021-02-02 NOTE — LETTER
2/2/2021       RE: Lucian Henderson Sr.  [address] [facility] [address]     Dear Colleague,    Thank you for referring your patient, Lucian Henderson Sr., to the [facility] [place] at [facility]. Please see a copy of my visit note below.      Lucian is a 65 year old who is being evaluated via a billable VIDEO visit.      What phone number would you like to be contacted at? [phone] - [place] [facility] patient request.    How would you like to obtain your AVS? Mail a copy  Phone call duration: 28 minutes    Platform used for Video Visit: Other: Whats Gab[facility] per patient request        Diabetes Virtual Consult Note  Marisabel Prieto PA-C  February 2, 2021      Lucian Henderson Sr. is a 65 year old male with a past medical history including  has a past medical history of CAD (coronary artery disease), CHF (congestive heart failure) (H), CKD (chronic kidney disease), stage III, Cortical cataract of both eyes, Diabetes (H), Hyperlipidemia, Hypertension, Ischemic cardiomyopathy, Obesity, CHANTEL (obstructive sleep apnea), and Osteoarthritis. He also has no past medical history of Chronic infection, Complication of anesthesia, COPD (chronic obstructive pulmonary disease) (H), Heart murmur, Irregular heart beat, Liver disease, Other chronic pain, or Uncomplicated asthma.  He is  s/p heart transplant on 7/19/2020 and left eye retinectomy on 12/21/20.      Lucian was again admitted to [facility] for hyperglycemia 9/22 - 10/5/2020 was again admitted due to high BG.  Hospital discharge notes Increased BS since transplant due to prednisone. We have worked together since and when we last spoke November 2020, he was doing well at home with NPH 35 qam and 8 qpm with 15 units /meal and 2u:30 >140 with custom printed sliding scale insulin:    Inject 0-31 Units Subcutaneous 3 times daily (with meals) Custom MEAL and SNACK corrective dosing      BG less than 80, do not give Novolog.  BG , give  15 units.  -169, give  17 units.  -199 give 19 units.  -229 give 21 units.  -259 give 23 units.  -289 give 25 units.  -319 give 27 units.  -349 give 29 units.  BG >/= 350 give 31 units.        Interim:  Lucian tells me that he is doing well.  He has not been vaccinated or contacted about this and would like to be vaccinated as soon as possible.  .      They have decreased NPH 30 in the morning when see low BG like 114 in the morning.  Shivani tells me that she is now giving 30 or 35 in the morning and in the night gives 12 or 10 units NPH depending on how high the BG is.    They are now giving short acting insulin with breakfast, just lunch as prescribed.  With dinner giving 10 - 15 units if BG is high.      Last BG check is at 8 or 8:3-0 and had coffee and bread or sweet potato.  If high she puts a bit of short action 12- 15 .  When gave 20 or more was low overnight.      They are not going out, even to exercise.  He does 30 minutes of treadmill daily and then 15 minutes of bike later with some weights every day.  Feels good with this; no worrisome symptoms, no low blood sugars with activity, chest pain or pressure, lightheaded, ALBRIGHT or abdominal pain.     He underwent left eye retinectomy in December and tells me vision in his left eye is improved.      A1C on 1/14 6.7, Hgb on 1/5/21 just 12.2 so A1C likely under representing glycemic levels .      We reviewed glucometer, pump and CGMS data together.  It revealed:    Glucose Readings:  Week of__/__/__ Date  __/_29_  Date  __/_28_ Date  __/_27_ Date  __/_26_ Date  __/_25_ Date  __/_24_ Date  __/_23_    Time BG Time BG Time BG Time BG Time BG Time BG Time BG      139  145  109  94  115  88    NOON   151  181  185  174  129  145    Even                 PM   161  189  175  191  189  165     Week of__/__/__ Date  __/_22_  Date  __/_21_ Date  __/_20_ Date  __/_19_  Date  __/_18_ Date  __/_17_ Date  __/_16_    Time BG Time BG Time BG Time BG Time BG Time BG Time BG      115  117  114  91  103  115    NOON 157  165  160  155  150  120  14  3    Even                   175  185  190  161  155  160     More BG >180 later in the day than previous.  Fasting BG at goal.      History of Diabetes monitoring and complications/ prevention:  CAD: Yes  Last eye exam results: 09/18/2018  Last dental exam: Denies concerns, plans post COVID.  Did do over the summer.   Microalbuminuria: 6 October 2018  HTN: Yes  On Statin: Yes  On Aspirin: Yes  Depression: No - worried, but happy. Hopeful.    ED: yes, limited concerned at this time     Janice  has a past medical history of CAD (coronary artery disease), CHF (congestive heart failure) (H), CKD (chronic kidney disease), stage III, Cortical cataract of both eyes, Diabetes (H), Hyperlipidemia, Hypertension, Ischemic cardiomyopathy, Obesity, CHANTEL (obstructive sleep apnea), and Osteoarthritis. He also has no past medical history of Chronic infection, Complication of anesthesia, COPD (chronic obstructive pulmonary disease) (H), Heart murmur, Irregular heart beat, Liver disease, Other chronic pain, or Uncomplicated asthma.  Current Outpatient Medications   Medication     acetaminophen (TYLENOL) 325 MG tablet     Alcohol Swabs PADS     aspirin (ASA) 81 MG chewable tablet     atropine 1 % ophthalmic solution     blood glucose (NO BRAND SPECIFIED) test strip     blood glucose monitoring (ACCU-CHEK FELIPE SMARTVIEW) meter device kit     blood glucose monitoring (ONE TOUCH DELICA) lancets     calcium carbonate 600 mg-vitamin D 400 units (CALTRATE) 600-400 MG-UNIT per tablet     furosemide (LASIX) 20 MG tablet     HUMALOG KWIKPEN 100 UNIT/ML soln     hydrALAZINE (APRESOLINE) 100 MG tablet     insulin aspart (NOVOLOG PEN) 100 UNIT/ML pen     insulin isophane human (HUMULIN N PEN) 100 UNIT/ML injection     insulin isophane human (HUMULIN N PEN) 100  UNIT/ML injection     insulin pen needle (32G X 4 MM) 32G X 4 MM miscellaneous     insulin pen needle (B-D U/F) 31G X 5 MM miscellaneous     insulin pen needle (NOVOFINE) 30G X 8 MM miscellaneous     latanoprost (XALATAN) 0.005 % ophthalmic solution     magnesium oxide 400 MG CAPS     mycophenolate (GENERIC EQUIVALENT) 250 MG capsule     neomycin-polymixin-dexamethasone (MAXITROL) 0.1 % ophthalmic suspension     neomycin-polymixin-dexamethasone (MAXITROL) ophthalmic ointment     order for DME     pantoprazole (PROTONIX) 40 MG EC tablet     prednisoLONE acetate (PRED FORTE) 1 % ophthalmic suspension     rosuvastatin (CRESTOR) 10 MG tablet     senna-docusate (SENOKOT-S/PERICOLACE) 8.6-50 MG tablet     sulfamethoxazole-trimethoprim (BACTRIM) 400-80 MG tablet     tacrolimus (GENERIC EQUIVALENT) 1 MG capsule     tamsulosin (FLOMAX) 0.4 MG capsule     Continuous Blood Gluc Sensor (FREESTYLE JEREMY 14 DAY SENSOR) MISC     Continuous Blood Gluc Sensor (FREESTYLE JEREMY 14 DAY SENSOR) [facility]     Current Facility-Administered Medications   Medication     bevacizumab (AVASTIN) intravitreal inj 1.25 mg         Lucian's family history includes Diabetes in his brother, sister, and sister.    ROS:   Patient denies any fevers, chills or sweats as well as any changes in or problems with vision, pain or problems with dentition, new or different headaches.  Patient denies symptoms of hypo and hyperglycemia except as above.   Patients denies marked fatigue, cough, shortness of breath, chest pain or pressure.  There has been no pain with or other changes in urination or  itching or pain in genital areas.  Patient denies any noted swelling in feet, ankles or otherwise, loss of sensation or pain  in feet or other areas.    Patient also denies current difficulties with depressed mood, anhedonia or worrying too much.  He is tired of COVID precautions, but feels god and optimistic.        Exam:    PHONE VISIT    Lucian is alerted and oriented.   "With Shivani.  Shivani takes notes of new evening sliding scale.  Provides diet and dosing history.    Mood is \"good,\" affect is congruent.  Thoughtful form and content are fluid and coherent.  No signs of distress are appreciated.      He shows me treadmill, stationary bicycle and weights in his living room.      Data:      Most recent:  Lab Results   Component Value Date    CR 1.77 08/31/2020     Lab Results   Component Value Date     08/31/2020       GFR Estimate   Date Value Ref Range Status   01/14/2021 41 (L) >60 mL/min/[1.73_m2] Final     Comment:     Non  GFR Calc  Starting 12/18/2018, serum creatinine based estimated GFR (eGFR) will be   calculated using the Chronic Kidney Disease Epidemiology Collaboration   (CKD-EPI) equation.     01/05/2021 41 (L) >60 mL/min/[1.73_m2] Final     Comment:     Non  GFR Calc  Starting 12/18/2018, serum creatinine based estimated GFR (eGFR) will be   calculated using the Chronic Kidney Disease Epidemiology Collaboration   (CKD-EPI) equation.     12/09/2020 35 (L) >60 mL/min/[1.73_m2] Final     Comment:     Non  GFR Calc  Starting 12/18/2018, serum creatinine based estimated GFR (eGFR) will be   calculated using the Chronic Kidney Disease Epidemiology Collaboration   (CKD-EPI) equation.         Lab Results   Component Value Date    A1C 6.7 (H) 01/14/2021    A1C 5.9 (H) 12/07/2020    A1C 8.2 (H) 07/03/2020    A1C 7.9 (H) 07/08/2019    A1C 8.5 (H) 03/11/2019    HEMOGLOBINA1 8.3 (A) 08/06/2018    HEMOGLOBINA1 10.6 (A) 04/30/2018     Lab Results   Component Value Date    MICROL 6 10/03/2018     No results found for: MICROALBUMIN  No results found for: CPEPT, GADAB, ISCAB  Cholesterol   Date Value Ref Range Status   01/05/2021 133 <200 mg/dL Final   08/17/2020 118 <200 mg/dL Final     HDL Cholesterol   Date Value Ref Range Status   01/05/2021 40 >39 mg/dL Final   08/17/2020 75 >39 mg/dL Final     LDL Cholesterol Calculated   Date " Value Ref Range Status   01/05/2021 58 <100 mg/dL Final     Comment:     Desirable:       <100 mg/dl   08/17/2020 26 <100 mg/dL Final     Comment:     Desirable:       <100 mg/dl     Triglycerides   Date Value Ref Range Status   01/05/2021 179 (H) <150 mg/dL Final     Comment:     Borderline high:  150-199 mg/dl  High:             200-499 mg/dl  Very high:       >499 mg/dl  Fasting specimen     08/17/2020 82 <150 mg/dL Final     Cholesterol/HDL Ratio   Date Value Ref Range Status   06/27/2015 2.6 0.0 - 5.0 Final   01/11/2015 6.0 (H) 0.0 - 5.0 Final           Assessment/Plan:    Lucian is a 65 year old male with  has a past medical history of CAD (coronary artery disease), CHF (congestive heart failure) (H), CKD (chronic kidney disease), stage III, Cortical cataract of both eyes, Diabetes (H), Hyperlipidemia, Hypertension, Ischemic cardiomyopathy, Obesity, CHANTEL (obstructive sleep apnea), and Osteoarthritis. He also has no past medical history of Chronic infection, Complication of anesthesia, COPD (chronic obstructive pulmonary disease) (H), Heart murmur, Irregular heart beat, Liver disease, Other chronic pain, or Uncomplicated asthma.  He is  s/p heart transplant on 7/19/2020 and left eye retinectomy on 12/21/20.    Lucian has type 2 diabetes complicated by ICM and heart transplant, CKD, obesity, retinopathy that is reasonably controlled early in the morning but with BG later in the day above goal.  I suspect some of this is due to varying NPH dose in the morning.      Advised:  NPH 35 units every morning regardless of fasting BG to prevent higher BG later and 11 units each evening.  Increase evening dose if fasting ALWAYS >105.    Continue 15 units Novolog with lunch + sliding scale insulin 2u:30 >140  With dinner give Novolog 10 u  - 200, 12 for  - 229, or 14u for BG >230.    Shivani able to read back and verbalize dosing.      RTC 2 months, sooner prn. , urged to contact me sooner of frequent BG >200, any  BG<70.      58 minutes in preparation for visit reviewing chart, labs and documentation, visiting with patient gathering history and in exam from both patient and spouse, education and counseling, as well as coordination of care, further chart review and documentation following visit on this date of service and as alluded to documented above.        It is my privilege to be involved in the care of the above patient.     aMrisabel Prieto PA-C, MPAS  [facility]  Diabetes, Endocrinology, and Metabolism  [phone] Appointments/Nurse  [phone] pager  [phone] nurse line    This note was completed in part using Dragon voice recognition, and may contain word and grammatical errors.

## 2021-02-03 ENCOUNTER — TELEPHONE (OUTPATIENT)
Dept: FAMILY MEDICINE | Facility: CLINIC | Age: 66
End: 2021-02-03

## 2021-02-03 NOTE — TELEPHONE ENCOUNTER
Reason for Call:  Other Home Care    Detailed comments:  RN wants to verify if Dr. Espitia will sign a plan of care for Pt since Pt will soon be admitting to a care facility.     Phone Number RN  can be reached at: Other phone number:  [phone]    Best Time: anytime    Can we leave a detailed message on this number? YES    Call taken on 2/3/2021 at 10:06 AM by Héctor Granado

## 2021-02-04 PROBLEM — R57.0 CARDIOGENIC SHOCK (H): Status: ACTIVE | Noted: 2021-02-04

## 2021-02-04 NOTE — TELEPHONE ENCOUNTER
Called [facility]ie and left message informing that Dr. Espitia is not familiar with this patient and is not willing to sign the plan of care.    Asad Flores

## 2021-02-08 NOTE — CONSULTS
[facility], [place]   Hematology/Oncology Consult Note    Lucian Henderson Sr. MRN# [id number]   Age: 64 year old YOB: 1955          Reason for Consult:   Anemia         Assessment and Plan:   Lucian Henderson Sr. is a 64 year male with a past medical history of ischemic cardiomyopathy and HFrEF, s/p orthotopic heart transplant on 7/19/2020, which was complicated by primary graft dysfunction now resolved, type 2 diabetes, CKD 3, recent line associated right upper extremity DVT and heparin-induced thrombocytopenia Dx 7/2020 (underwent PLEX before transplant), currently on fondaparinux,   who was admitted on 8/24/2020 for hyperglycemia thought to be steroid-induced.  He was also found to have a acute on chronic normocytic anemia.  His hemoglobin on presentation was 6.8.  He received 1 units of blood on 8/25/2020 with an appropriate response to 8.5 and is 7.8 today.  His hemoglobin at the beginning of his recent prolonged hospitalization was in the 12 range, and over the course of the month he had drifted down to the 7-9 range.  Even on 8/17/2020 he was noted to be 6.9 at his outpatient visit.    Review of pertinent labs  -Total WBC normal, ANC normal, platelet count normal.  ALC low on 8/24/2020 (on steroids)  -Reticulocyte 3.9% with an absolute of 85 - hypoproliferative for degree of anemia   -Haptoglobin 86, , total bili 0.5  -Ferritin 388  -Coags INR 1.1, PTT not recently checked.  -Vitamin B12 328, folate 8.1  -TSH on 8/5/2020 was 0.8  -Peripheral smear -slightly macrocytic anemia, only rare fragmented red cells.    His immunosuppression regimen includes tacrolimus, mycophenolate and prednisone.    Overall suspect his anemia is multifactorial from his recent acute illness, inflammation,  drug-related (new immunosuppressants particularly the MMF, Bactrim) and renal dysfunction.  He does not report any evident blood loss and his ferritin does not  Referred by: Genesis Bonilla MD; Medical Diagnosis (from order):    Diagnosis Information      Diagnosis    724.2, 724.3, 338.29 (ICD-9-CM) - M54.42, G89.29 (ICD-10-CM) - Chronic left-sided low back pain with left-sided sciatica    729.89 (ICD-9-CM) - R29.898 (ICD-10-CM) - Left leg weakness                Physical Therapy -  Daily Treatment Note    Visit:  2     SUBJECTIVE                                                                                                             Patient reports that she had increased low back pain yesterday, and the she had a bowel movement and it felt better.  She states that she has not been regular since she came back from [place].  She thinks she is dehydrated and hasn't been eating her bananas.  Continued twinged at the left hip.  Prior history of hysterectomy, ce-section, galll-bladder and lung resection.  Functional Change: Decreased pain after bowel movement    Pain / Symptoms:  Pain rating (out of 10): Current: 3     OBJECTIVE                                                                                                                       Palpation:   Comments / Details: Moderate soft tissue restrictions along the lumbar paraspinals,       TREATMENT                                                                                                                initial evaluation completed    Manual Therapy:  Posterior-anterior glide of sacrum    Neuromuscular Re-Education:  Patient requires extensive manual and verbal cueing to illicit proper movement pattern to allow patient to meet set goals:    Diaphragmatic breathing  LTR with left quadrant abdominal mobilization - decreased SLR after   pnf pelvic pattern  basking seal  posterior pelvic tilt with glute squeeze - prep for bridge - unable to perform bridge  piiriformis stretch on edge of sofa and edge of bed    Skilled input: verbal instruction/cues, tactile instruction/cues, posture correction, facilitation,  inhibition and as detailed above    Writer verbally educated and received verbal consent for hand placement, positioning of patient, and techniques to be performed today from patient for clothing adjustments for techniques, therapist position for techniques, modality application and hand placement and palpation for techniques as described above and how they are pertinent to the patient's plan of care.    Home Exercise Program: Diaphragmatic breathing  LTR with left quadrant abdominal mobilization     ASSESSMENT                                                                                                             Patient presents with improved mobility of sacrum afer manual treatment.  Will continue with increased stabilization to allow for further stabilization to allow her to climb steps without difficulty.  Pain/symptoms after session: 3    Patient Education:   Results of above outlined education: Verbalizes understanding and Demonstrates understanding          Procedures and total treatment time documented Time Entry flowsheet.   support iron deficiency.  His macrocytosis may be related to Bactrim.  His labs do not suggest active hemolysis.  He does not have B12 or folate deficiency.  Would recommend checking a copper level and an EPO level.    Problem list:   #Normo to Macrocytic anemia,  #Heparin-induced thrombocytopenia  #Recent right internal jugular line associated DVT, 7/22/2020  #CKD  #Recent orthotopic heart transplant on immunosuppression  #Uncontrolled DM    Summary of Recommendations:    Copper level    EPO level given CKD    Low suspicion but recommend checking G6PD    Recommend seeing if the MMF dose could be lowered if possible     Agree with transfusion for hemoglobin less than 7    Avoidance of heparin products    Continue fondaparinux for recent line associated RIJ DVT in the setting of HIT. Total duration of anticoagulation will be 3 months.     Thank you for involving us in the care of this patient. We will continue to follow during the hospitalization.    Patient was seen and plan of care developed with Dr. Allen    Attending Note:  I have reviewed the patient chart, and interviewed and examined the patient.  I agree with the assessment and plan.  Evelia Allen MD  Hematology           History of Present Illness:   History obtained from chart review and confirmed with patient.     64 year male with a past medical history of ischemic cardiomyopathy and HFrEF, s/p orthotopic heart transplant on 7/19/2020, which was complicated by primary graft dysfunction now resolved, type 2 diabetes, CKD 3, recent line associated right upper extremity DVT and heparin-induced thrombocytopenia Dx 7/2020 (underwent PLEX before transplant), currently on fondaparinux,   who was admitted on 8/24/2020 for hyperglycemia thought to be steroid-induced.  He was also found to have a acute on chronic normocytic anemia.    Hematology is consulted for assistance with his anemia.  At time of interview he reports may be feeling more tired.  He is has  not noticed any bright red blood per rectum, melena, hematemesis or bleeding anywhere else.       Review of Systems:   A comprehensive ROS was performed with the patient and was found to be negative with the exception of that noted in the HPI above.       Past Medical History:     Past Medical History:   Diagnosis Date     CAD (coronary artery disease)      CHF (congestive heart failure) (H)      CKD (chronic kidney disease), stage III (H)      Cortical cataract of both eyes      Diabetes (H)      Hyperlipidemia      Hypertension      Ischemic cardiomyopathy      Obesity      CHANTEL (obstructive sleep apnea)     occas cpap     Osteoarthritis             Past Surgical History:     Past Surgical History:   Procedure Laterality Date     C CABG, ARTERY-VEIN, THREE  02/2008     CATARACT IOL, RT/LT       COLONOSCOPY N/A 8/7/2019    Procedure: COLONOSCOPY, WITH POLYPECTOMY AND BIOPSY;  Surgeon: Chauncey Morataya MD;  Location:  GI     CV ANGIOGRAM CORONARY GRAFT N/A 7/2/2020    Procedure: Angiogram Coronary Graft;  Surgeon: Alex Lantigua MD;  Location:  HEART CARDIAC CATH LAB     CV CORONARY ANGIOGRAM N/A 7/2/2020    Procedure: CV CORONARY ANGIOGRAM;  Surgeon: Alex Lantigua MD;  Location:  HEART CARDIAC CATH LAB     CV HEART BIOPSY N/A 7/27/2020    Procedure: Heart Biopsy;  Surgeon: Mario Burr MD;  Location:  HEART CARDIAC CATH LAB     CV HEART BIOPSY N/A 8/3/2020    Procedure: CV HEART BIOPSY;  Surgeon: Alex Lantigua MD;  Location:  HEART CARDIAC CATH LAB     CV HEART BIOPSY N/A 8/10/2020    Procedure: CV HEART BIOPSY;  Surgeon: Mario Burr MD;  Location:  HEART CARDIAC CATH LAB     CV HEART BIOPSY N/A 8/17/2020    Procedure: CV HEART BIOPSY;  Surgeon: Raimnudo Hudson MD;  Location:  HEART CARDIAC CATH LAB     CV INTRA-AORTIC BALLOON PUMP INSERTION N/A 7/14/2020    Procedure: RHC WITH LEAVE IN SWAN/IABP;  Surgeon: Sonny Saleh MD;  Location:   HEART CARDIAC CATH LAB     CV RIGHT HEART CATH N/A 3/25/2019    Procedure: CV RIGHT HEART CATH;  Surgeon: Moises Santos MD;  Location:  HEART CARDIAC CATH LAB     CV RIGHT HEART CATH N/A 7/10/2019    Procedure: CV RIGHT HEART CATH;  Surgeon: Jak Mccabe MD;  Location:  HEART CARDIAC CATH LAB     CV RIGHT HEART CATH N/A 7/8/2020    Procedure: Right Heart Cath with Leave In [place] already has ICU load;  Surgeon: Jak Mccabe MD;  Location:  HEART CARDIAC CATH LAB     CV RIGHT HEART CATH N/A 7/14/2020    Procedure: CV RIGHT HEART CATH;  Surgeon: Sonny Saleh MD;  Location:  HEART CARDIAC CATH LAB     CV RIGHT HEART CATH N/A 7/2/2020    Procedure: CV RIGHT HEART CATH;  Surgeon: Alex Lantigua MD;  Location:  HEART CARDIAC CATH LAB     CV RIGHT HEART CATH N/A 7/27/2020    Procedure: Right Heart Cath;  Surgeon: Mario Burr MD;  Location:  HEART CARDIAC CATH LAB     CV RIGHT HEART CATH N/A 8/3/2020    Procedure: Right Heart Cath;  Surgeon: Alex Lantigua MD;  Location:  HEART CARDIAC CATH LAB     CV RIGHT HEART CATH N/A 8/10/2020    Procedure: CV RIGHT HEART CATH;  Surgeon: Mario Burr MD;  Location:  HEART CARDIAC CATH LAB     CV RIGHT HEART CATH N/A 8/17/2020    Procedure: CV RIGHT HEART CATH;  Surgeon: Raimundo Hudson MD;  Location:  HEART CARDIAC CATH LAB     EXTRACTION(S) DENTAL Left 7/13/2020    Procedure: EXTRACTION, TOOTH #11, 12, 13, 15, and 29;  Surgeon: Monica Chao DDS;  Location:  OR     IMPLANT AUTOMATIC IMPLANTABLE CARDIOVERTER DEFIBRILLATOR       INSERT INTRAAORTIC BALLOON PUMP N/A 7/16/2020    Procedure: Subclavian Intra-Aortic Balloon Pump Placement;  Surgeon: Allan Sparrow MD;  Location: [facility]     PHACOEMULSIFICATION CLEAR CORNEA WITH STANDARD IOL, VITRECTOMY PARSPLANA 25 GAGUE, COMBINED Left 12/10/2019    Procedure: PHACOEMULSIFICATION, CATARACT, CLEAR CORNEAL INCISION APPROACH, W STD INTRAOCULAR LENS  IMPLANT INSERT + VITRECTOMY BY PARS PLANA  USING 25-GAUGE INSTRUMENTS. ENDOLASER, INFUSION OF 20% SF6 GAS;  Surgeon: Triny Bunch MD;  Location: [facility] OR     PICC DOUBLE LUMEN PLACEMENT Left 07/28/2020    5Fr - 42cm, Basilic vein, mid SVC     PICC INSERTION Right 07/11/2020    basilic 44 cm total      TRANSPLANT HEART RECIPIENT N/A 7/19/2020    Procedure: REDO MEDIAN STERNOTOMY, TRANSPLANT, ORTHOTOPIC HEART, RECIPIENT, ON PUMP OXYGENATOR, REMOVAL OF CARDIAC DEFIBRILLATOR AND LEAD;  Surgeon: Griselli, Massimo, MD;  Location: [facility] OR            Social History:     Social History     Socioeconomic History     Marital status:      Spouse name: Not on file     Number of children: 4     Years of education: Not on file     Highest education level: Not on file   Occupational History     Occupation:      Employer: [email or web address]     Employer: RETIRED   Social Needs     Financial resource strain: Not on file     Food insecurity     Worry: Not on file     Inability: Not on file     Transportation needs     Medical: Not on file     Non-medical: Not on file   Tobacco Use     Smoking status: Never Smoker     Smokeless tobacco: Never Used     Tobacco comment: Never smoked; non-smoking household   Substance and Sexual Activity     Alcohol use: No     Alcohol/week: 0.0 standard drinks     Drug use: No     Sexual activity: Yes     Partners: Female   Lifestyle     Physical activity     Days per week: Not on file     Minutes per session: Not on file     Stress: Not on file   Relationships     Social connections     Talks on phone: Not on file     Gets together: Not on file     Attends Yazdanism service: Not on file     Active member of club or organization: Not on file     Attends meetings of clubs or organizations: Not on file     Relationship status: Not on file     Intimate partner violence     Fear of current or ex partner: Not on file     Emotionally abused: Not on file     Physically abused: Not on  file     Forced sexual activity: Not on file   Other Topics Concern     Parent/sibling w/ CABG, MI or angioplasty before 65F 55M? No   Social History Narrative     Not on file            Family History:     Family History   Problem Relation Age of Onset     Diabetes Brother      Diabetes Sister      Diabetes Sister      Macular Degeneration No family hx of      Glaucoma No family hx of      Myocardial Infarction No family hx of      Kidney Disease No family hx of             Allergies:     Allergies   Allergen Reactions     Heparin Heparin Induced Thrombocytopenia     HIT screen sent 7/18/2020. CORRINE confirmed positive            Medications:     Medications Prior to Admission   Medication Sig Dispense Refill Last Dose     acetaminophen (TYLENOL) 325 MG tablet Take 3 tablets (975 mg) by mouth every 8 hours as needed for mild pain 60 tablet 0 Past Week at Unknown time     amLODIPine (NORVASC) 5 MG tablet Take 1 tablet (5 mg) by mouth daily 90 tablet 3 8/24/2020 at Unknown time     blood glucose (ACCU-CHEK SMARTVIEW) test strip USE TO TEST THREE TIMES DAILY AS DIRECTED 100 strip 0 8/24/2020 at Unknown time     blood glucose (NO BRAND SPECIFIED) test strip Use to test blood sugar 2 times daily or as directed. 200 strip 3 8/24/2020 at Unknown time     blood glucose monitoring (ACCU-CHEK FELIPE SMARTVIEW) meter device kit Use to test blood sugar 3-4 times daily, as directed. 1 kit 0 8/24/2020 at Unknown time     blood glucose monitoring (ONE TOUCH DELICA) lancets Use to test blood sugars 2 times daily. 200 each 3 8/24/2020 at Unknown time     calcium carbonate 600 mg-vitamin D 400 units (CALTRATE) 600-400 MG-UNIT per tablet Take 1 tablet by mouth 2 times daily (with meals) 180 tablet 3 8/24/2020 at Unknown time     Continuous Blood Gluc Sensor (FREESTYLE JEREMY 14 DAY SENSOR) MISC Change every 14 days. 6 each 4 8/24/2020 at Unknown time     fondaparinux ANTICOAGULANT (ARIXTRA) 7.5MG/0.6ML injection Inject 0.6 mLs (7.5 mg)  Subcutaneous every 24 hours Take on Tuesday 8/4 at 10 am, then start taking fondaparinux shot daily at 8 am after that (starting 8/5). Hold dose on morning of your right heart cath and biopsies 90 Syringe 3 8/24/2020 at Unknown time     furosemide (LASIX) 20 MG tablet Take 1 tablet (20 mg) by mouth daily 90 tablet 3 8/24/2020 at Unknown time     glimepiride (AMARYL) 2 MG tablet Take 3 tablets (6 mg) by mouth daily (with breakfast) 270 tablet 3 8/24/2020 at Unknown time     hydrALAZINE (APRESOLINE) 25 MG tablet Take 3 tablets (75 mg) by mouth every 8 hours 270 tablet 1 8/24/2020 at Unknown time     insulin aspart (NOVOLOG PEN) 100 UNIT/ML pen Inject 2-16 Units Subcutaneous 3 times daily (with meals) Correction Scale - custom DOSING     Do Not give Correction Insulin if Pre-Meal BG less than 140     -169 give 2 units.  -199 give 4 units.  -229 give 6 units.  -259 give 8 units.  -289 give 10 units.  -319 give 12 units.  -349 give 14 units.  BG >/= 350 give 16 units.        To be given with prandial insulin, and based on pre-meal blood glucose.   Notify provider if glucose greater than or equal 350 mg/dL after administration. If given at mealtime, administer within 30 minutes of start of meal 9 mL 4 8/24/2020 at Unknown time     insulin isophane human (HUMULIN N PEN) 100 UNIT/ML injection Inject 50 Units Subcutaneous every morning 9 mL 11 8/24/2020 at Unknown time     insulin isophane human (HUMULIN N PEN) 100 UNIT/ML injection Inject 40 Units Subcutaneous every evening 30 mL 1 8/24/2020 at Unknown time     insulin pen needle (32G X 4 MM) 32G X 4 MM miscellaneous Use as directed by provider  Per insurance coverage 100 each 0 8/24/2020 at Unknown time     insulin pen needle (B-D U/F) 31G X 5 MM miscellaneous 1 Units by Device route 2 times daily Use 2 pen needles daily or as directed. 100 each 3 8/24/2020 at Unknown time     insulin pen needle (NOVOFINE) 30G X 8 MM miscellaneous  Inject 1 Box Subcutaneous 6 times daily Use 6 pen needles daily or as directed. 200 each 3 8/24/2020 at Unknown time     methocarbamol (ROBAXIN) 500 MG tablet 1 tablet (500 mg) by Oral or Feeding Tube route 4 times daily as needed for muscle spasms 30 tablet 1 Past Week at Unknown time     mycophenolate (GENERIC EQUIVALENT) 250 MG capsule Take 6 capsules (1,500 mg) by mouth 2 times daily 360 capsule 3 8/24/2020 at Unknown time     nystatin (MYCOSTATIN) 284765 UNIT/ML suspension Swish and swallow 10 mLs (1,000,000 Units) in mouth 4 times daily 800 mL 4 8/24/2020 at Unknown time     pantoprazole (PROTONIX) 40 MG EC tablet Take 1 tablet (40 mg) by mouth every morning (before breakfast) 90 tablet 3 8/24/2020 at Unknown time     polyethylene glycol (MIRALAX) 17 g packet 17 g by Oral or Feeding Tube route daily 7 packet 0 Past Week at Unknown time     predniSONE (DELTASONE) 10 MG tablet Take 1.5 tablets (15 mg) by mouth every morning AND 1 tablet (10 mg) every evening. 180 tablet 1 8/24/2020 at Unknown time     rosuvastatin (CRESTOR) 10 MG tablet Take 1 tablet (10 mg) by mouth daily 90 tablet 3 8/24/2020 at Unknown time     sitagliptin (JANUVIA) 100 MG tablet Take 1 tablet (100 mg) by mouth daily 90 tablet 3 Past Week at Unknown time     sulfamethoxazole-trimethoprim (BACTRIM) 400-80 MG tablet Take 1 tablet by mouth daily 90 tablet 3 8/24/2020 at Unknown time     tacrolimus (GENERIC EQUIVALENT) 0.5 MG capsule ON HOLD- for medication dose changes. 90 capsule 1 Past Week at Unknown time     tacrolimus (GENERIC EQUIVALENT) 1 MG capsule Take 6 capsules (6 mg) by mouth every morning AND 5 capsules (5 mg) every evening. 330 capsule 3 Past Week at Unknown time     tamsulosin (FLOMAX) 0.4 MG capsule Take 1 capsule (0.4 mg) by mouth daily 30 capsule 11 8/24/2020 at Unknown time     valGANciclovir (VALCYTE) 450 MG tablet Take 1 tablet (450 mg) by mouth daily 30 tablet 1 8/24/2020 at Unknown time     Alcohol Swabs PADS Use to swab  the area of the injection or sergio as directed   Per insurance coverage 100 each 0 Unknown at Unknown time     order for DME Auto CPAP 8-15 cmH20 1 Units 0 Unknown at Unknown time     senna-docusate (SENOKOT-S/PERICOLACE) 8.6-50 MG tablet Take 1 tablet by mouth 2 times daily as needed for constipation 50 tablet 0             Physical Exam:   /62 (BP Location: Left arm)   Pulse 97   Temp 97.9  F (36.6  C) (Oral)   Resp 16   Wt 78.1 kg (172 lb 2.9 oz)   SpO2 99%   BMI 28.65 kg/m    Vitals:    08/25/20 0657 08/26/20 0600   Weight: 81.8 kg (180 lb 5.4 oz) 78.1 kg (172 lb 2.9 oz)     General: Appears well,  Heme/Lymph: No overt bleeding  Skin: No concerning lesions, rash,.  HEENT: NCAT. EOMI, anicteric sclera..  Respiratory: Non-labored breathing, good air exchange, lungs clear to auscultation bilaterally.  Cardiovascular: Regular rate and rhythm. No murmur or rub.   Gastrointestinal: Normoactive bowel sounds. Abdomen soft, non-distended, and non-tender. No palpable masses or organomegaly.  Extremities: Bilateral lower extremity edema +  Neurologic: A&O x 3,.         Data:   I have personally reviewed the following labs/imaging:  CBC  Recent Labs   Lab 08/26/20 0455 08/25/20  1705 08/25/20 0445 08/24/20 2026   WBC 4.3 4.5 4.3 4.9   RBC 2.52* 2.74* 2.10* 2.17*   HGB 7.8* 8.5* 6.7* 6.8*   HCT 24.7* 28.6* 21.3* 21.9*   MCV 98 104* 101* 101*   MCH 31.0 31.0 31.9 31.3   MCHC 31.6 29.7* 31.5 31.1*   RDW 21.0* 21.1* 21.2* 21.2*    180 181 193     CMP  Recent Labs   Lab 08/26/20  0455 08/25/20  0445 08/24/20 2026    133 132*   POTASSIUM 4.7 4.9 5.0   CHLORIDE 104 102 101   CO2 23 24 23   ANIONGAP 7 7 8   GLC 91 231* 269*   BUN 72* 64* 67*   CR 1.77* 1.97* 1.97*   GFRESTIMATED 39* 34* 34*   GFRESTBLACK 45* 40* 40*   BRYANNA 8.2* 8.1* 8.2*   MAG 1.8 1.8 1.9   PROTTOTAL  --   --  5.7*   ALBUMIN  --   --  2.7*   BILITOTAL  --   --  0.5   ALKPHOS  --   --  124   AST  --   --  12   ALT  --   --  28      INR  Recent Labs   Lab 08/25/20  0445   INR 1.11

## 2021-02-11 ENCOUNTER — OFFICE VISIT (OUTPATIENT)
Dept: OPHTHALMOLOGY | Facility: CLINIC | Age: 66
End: 2021-02-11
Attending: OPHTHALMOLOGY
Payer: COMMERCIAL

## 2021-02-11 DIAGNOSIS — E11.3513 TYPE 2 DIABETES MELLITUS WITH PROLIFERATIVE RETINOPATHY OF BOTH EYES AND MACULAR EDEMA, UNSPECIFIED WHETHER LONG TERM INSULIN USE (H): ICD-10-CM

## 2021-02-11 PROCEDURE — G0463 HOSPITAL OUTPT CLINIC VISIT: HCPCS | Mod: 25

## 2021-02-11 PROCEDURE — 250N000011 HC RX IP 250 OP 636: Performed by: OPHTHALMOLOGY

## 2021-02-11 PROCEDURE — 67028 INJECTION EYE DRUG: CPT | Mod: RT | Performed by: OPHTHALMOLOGY

## 2021-02-11 PROCEDURE — 92134 CPTRZ OPH DX IMG PST SGM RTA: CPT | Performed by: OPHTHALMOLOGY

## 2021-02-11 RX ADMIN — Medication 1.25 MG: at 09:48

## 2021-02-11 ASSESSMENT — CONF VISUAL FIELD
OD_INFERIOR_TEMPORAL_RESTRICTION: 3
OS_INFERIOR_NASAL_RESTRICTION: 3
OS_SUPERIOR_NASAL_RESTRICTION: 3
OS_INFERIOR_TEMPORAL_RESTRICTION: 3
OD_SUPERIOR_NASAL_RESTRICTION: 3
OD_SUPERIOR_TEMPORAL_RESTRICTION: 3
OS_SUPERIOR_TEMPORAL_RESTRICTION: 3

## 2021-02-11 ASSESSMENT — VISUAL ACUITY
METHOD: SNELLEN - LINEAR
OD_SC+: -1
OS_SC: CF @ 5'
OD_SC: 20/60

## 2021-02-11 ASSESSMENT — TONOMETRY
IOP_METHOD: TONOPEN
OD_IOP_MMHG: 17
OS_IOP_MMHG: 23

## 2021-02-11 ASSESSMENT — SLIT LAMP EXAM - LIDS
COMMENTS: PTOSIS OD>OS
COMMENTS: PTOSIS OD>OS

## 2021-02-11 ASSESSMENT — EXTERNAL EXAM - LEFT EYE: OS_EXAM: NORMAL

## 2021-02-11 ASSESSMENT — EXTERNAL EXAM - RIGHT EYE: OD_EXAM: NORMAL

## 2021-02-11 NOTE — NURSING NOTE
Chief Complaints and History of Present Illnesses   Patient presents with     Post Op (Ophthalmology) Left Eye     Post 27 gauge pars plana vitectomy, membrane peel, perfluoroctane liquid (PFO), retinectomy, endolaser, Silicone Oil 1000 cs left eye 12/21/2020     Chief Complaint(s) and History of Present Illness(es)     Post Op (Ophthalmology) Left Eye     Laterality: left eye    Course: stable    Associated symptoms: Negative for eye pain, dryness, flashes and floaters    Treatments tried: eye drops    Pain scale: 0/10    Comments: Post 27 gauge pars plana vitectomy, membrane peel, perfluoroctane liquid (PFO), retinectomy, endolaser, Silicone Oil 1000 cs left eye 12/21/2020              Comments     He states that his vision has seemed stable or better in his left eye, since his last eye exam.       Lab Results       Component                Value               Date                       A1C                      6.7                 01/14/2021                 A1C                      5.9                 12/07/2020                 A1C                      8.2                 07/03/2020                 A1C                      7.9                 07/08/2019                 A1C                      8.5                 03/11/2019              He is using:  latanoprost at bedtime in his left eye  Predforte twice a day in his left eye     Jacque Turcios, COT 9:02 AM  February 11, 2021

## 2021-02-11 NOTE — PROGRESS NOTES
CC: follow up status post PPV/MP/retinectomy 12/21/20. 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 Denies eye pain.    Right eye: vitreous hemorrhage   Left eye: Patient states vision improved in left eye. Now count fingers    Stable     Optical Coherence Tomography:   Right eye: good foveal contour  Left eye: mild Epiretinal membrane; nasal edema; inferior area of subretinal fluid. Irregular retina      Plan:   - I again had an extensive discussion about guarded prognosis of left eye. Patient was found to have funnel RD that despite best efforts, was not able to be . Nonetheless, the temporal folded retina appears flat on exam today and patient is happy with the improved vision.  - Plan for avastin inj right eye today 02/11/21   - Continue latanoprost at bedtime both eyes   - Predforte every other day left eye   - Retina detachment and endophthalmitis precautions were discussed with the patient and was asked to return if any of the those occur.  - Follow up in 1 month for Post op check left eye and exam + likely avastin injection right eye   - Will need Panretinal laser photocoagulation (PRP) filling right eye when vitreous hemorrhage clears up      ~~~~~~~~~~~~~~~~~~~~~~~~~~~~~~~~~~   Complete documentation of historical and exam elements from today's encounter can be found in the full encounter summary report (not reduplicated in this progress note).  I personally obtained the chief complaint(s) and history of present illness.  I confirmed and edited as necessary the review of systems, past medical/surgical history, family history, social history, and examination findings as documented by others; and I examined the patient myself.  I personally reviewed the relevant tests, images, and reports as documented above.  I formulated and edited as necessary the assessment and plan and discussed the findings and management plan with the patient and family and No resident or fellow assisted with the  procedures performed.  I performed the procedures myself.    Triny Bunch MD   of Ophthalmology.  Retina Service   Department of Ophthalmology and Visual Neurosciences   [facility]  Phone: [phone]   Fax: [phone]

## 2021-02-25 ENCOUNTER — PRE VISIT (OUTPATIENT)
Dept: TRANSPLANT | Facility: CLINIC | Age: 66
End: 2021-02-25

## 2021-02-25 DIAGNOSIS — Z94.1 HEART TRANSPLANT, ORTHOTOPIC, STATUS (H): Primary | ICD-10-CM

## 2021-03-01 DIAGNOSIS — Z94.1 HEART TRANSPLANT, ORTHOTOPIC, STATUS (H): ICD-10-CM

## 2021-03-02 ENCOUNTER — ANCILLARY PROCEDURE (OUTPATIENT)
Dept: CARDIOLOGY | Facility: CLINIC | Age: 66
End: 2021-03-02
Attending: INTERNAL MEDICINE
Payer: COMMERCIAL

## 2021-03-02 VITALS
DIASTOLIC BLOOD PRESSURE: 76 MMHG | WEIGHT: 179 LBS | HEIGHT: 65 IN | OXYGEN SATURATION: 97 % | HEART RATE: 86 BPM | SYSTOLIC BLOOD PRESSURE: 125 MMHG | BODY MASS INDEX: 29.82 KG/M2

## 2021-03-02 DIAGNOSIS — Z94.1 HEART TRANSPLANT, ORTHOTOPIC, STATUS (H): ICD-10-CM

## 2021-03-02 DIAGNOSIS — Z94.1 HEART REPLACED BY TRANSPLANT (H): Primary | ICD-10-CM

## 2021-03-02 LAB
ANION GAP SERPL CALCULATED.3IONS-SCNC: 7 MMOL/L (ref 3–14)
BUN SERPL-MCNC: 36 MG/DL (ref 7–30)
CALCIUM SERPL-MCNC: 9.2 MG/DL (ref 8.5–10.1)
CHLORIDE SERPL-SCNC: 108 MMOL/L (ref 94–109)
CO2 SERPL-SCNC: 27 MMOL/L (ref 20–32)
CREAT SERPL-MCNC: 1.83 MG/DL (ref 0.66–1.25)
ERYTHROCYTE [DISTWIDTH] IN BLOOD BY AUTOMATED COUNT: 14 % (ref 10–15)
GFR SERPL CREATININE-BSD FRML MDRD: 37 ML/MIN/{1.73_M2}
GLUCOSE SERPL-MCNC: 110 MG/DL (ref 70–99)
HCT VFR BLD AUTO: 39.3 % (ref 40–53)
HGB BLD-MCNC: 12.3 G/DL (ref 13.3–17.7)
MAGNESIUM SERPL-MCNC: 1.7 MG/DL (ref 1.6–2.3)
MCH RBC QN AUTO: 29.5 PG (ref 26.5–33)
MCHC RBC AUTO-ENTMCNC: 31.3 G/DL (ref 31.5–36.5)
MCV RBC AUTO: 94 FL (ref 78–100)
PHOSPHATE SERPL-MCNC: 3.4 MG/DL (ref 2.5–4.5)
PLATELET # BLD AUTO: 198 10E9/L (ref 150–450)
POTASSIUM SERPL-SCNC: 4.2 MMOL/L (ref 3.4–5.3)
RBC # BLD AUTO: 4.17 10E12/L (ref 4.4–5.9)
SODIUM SERPL-SCNC: 142 MMOL/L (ref 133–144)
TACROLIMUS BLD-MCNC: 6.5 UG/L (ref 5–15)
TME LAST DOSE: NORMAL H
WBC # BLD AUTO: 4.5 10E9/L (ref 4–11)

## 2021-03-02 PROCEDURE — 80197 ASSAY OF TACROLIMUS: CPT | Performed by: PATHOLOGY

## 2021-03-02 PROCEDURE — 84100 ASSAY OF PHOSPHORUS: CPT | Performed by: PATHOLOGY

## 2021-03-02 PROCEDURE — 80048 BASIC METABOLIC PNL TOTAL CA: CPT | Performed by: PATHOLOGY

## 2021-03-02 PROCEDURE — 85027 COMPLETE CBC AUTOMATED: CPT | Performed by: PATHOLOGY

## 2021-03-02 PROCEDURE — 99215 OFFICE O/P EST HI 40 MIN: CPT | Performed by: NURSE PRACTITIONER

## 2021-03-02 PROCEDURE — 36415 COLL VENOUS BLD VENIPUNCTURE: CPT | Performed by: PATHOLOGY

## 2021-03-02 PROCEDURE — 83735 ASSAY OF MAGNESIUM: CPT | Performed by: PATHOLOGY

## 2021-03-02 PROCEDURE — 93306 TTE W/DOPPLER COMPLETE: CPT | Performed by: STUDENT IN AN ORGANIZED HEALTH CARE EDUCATION/TRAINING PROGRAM

## 2021-03-02 ASSESSMENT — PAIN SCALES - GENERAL: PAINLEVEL: NO PAIN (0)

## 2021-03-02 ASSESSMENT — MIFFLIN-ST. JEOR: SCORE: 1513.82

## 2021-03-02 NOTE — PATIENT INSTRUCTIONS
Patient Instructions  1. Arlin will check with Dr. Bob about Bactrim dosing.  2. Christelle will call with all pending lab results.  3. Check with [facility] pharmacy about Covid shot.    Next transplant clinic appointment:  In May for 10 month visit  Next lab draw:   Coordinator will call with all pending results.     Please call transplant coordinator with any questions:    Christelle Pettit RN BSN   Post Heart Transplant Nurse Coordinator  [facility]  Questions: [phone]

## 2021-03-02 NOTE — NURSING NOTE
Transplant Coordinator Note    Reason for visit: 8 month visit  Coordinator: Present   Caregiver:  none today    Health concerns addressed today:  1. Working on strength- exercising, lifting weights.  2. Leg swelling has improved- still on Lasix 20mg   3. Surgery to eye was successful per pt- can see equally out of each eye.      Immunosuppressants:  MMF 1000 BID  Tacro 5/5      Labs:   Creat 1.8.  Will review Bactrim dosing with TT.          Labs/echo reviewed with patient  Medication record reviewed and reconciled  Questions and concerns addressed  Pt verbalized an understanding of plan of care.     Patient Instructions  1. Arlin will check with Dr. Bob about Bactrim dosing.  2. Christelle will call with all pending lab results.  3. Check with [facility] pharmacy about Covid shot.    Next transplant clinic appointment:  In May for 10 month visit  Next lab draw:   Coordinator will call with all pending results.     Please call transplant coordinator with any questions:    Christelle Pettit RN BSN   Post Heart Transplant Nurse Coordinator  [facility]  Questions: [phone]

## 2021-03-02 NOTE — PROGRESS NOTES
ADULT [facility]ART TRANSPL[facility] CLINIC  March 2, 2021    HPI:   Mr. Lucian Henderson is a 65yr old male with a history of CAD (s/p 3v CABG 2008), ICM s/p OHT 7/19/20, DMII, CKD, and HTN who presents to clinic for routine follow up.  A professional  was used for this visit.     His post-operative course was c/b primary graft dysfunction (LVEF 20-25% and severe RV dysfunction, thought to be secondary to prolonged ischemic time, resolved by f/up TTE 7/27), VT, pericardial effusion (incidentally found per TTE 7/27, decreased size noted on TTE 7/29), donor streptococcus salivarius bacteremia (s/p 7d course of ceftriaxone), and RUE DVT c/b HIT (s/p PLEX).  He ultimately discharged 8/3.     He was readmitted to [facility] 8/24 with hyperglycemia (BGs 500s).  He did not have any evidence of DKA, HHS, infection, or acute illness.  Endocrine was consulted, and it was discovered that he may have been incorrectly administering insulin at home.  He was also found to be anemic, thought to be secondary to bone marrow suppression.  Peripheral smear 8/24 showed markedly slightly macrocytic anemia with preserved erythrocyte regeneration, and no definitive evidence of hemolysis.  He was also found to have subtherapeutic tacro levels, so was started on clotrimazole troches to help boost his levels.  He ultimately discharged 8/28.     He presented to the ED 9/22 with fevers and drainage from his former ICD subclavian site.  Chest/abd CT also showed concerns for cellulitis in his epigastric region.  He underwent surgical debridement of his left infraclavicular wound and debridement of midline chest tube wound on 9/23, which was c/b bleeding and required reoperation 9/28 for I&D of hematoma.  Cultures were positive for Pseudomonas aeruginosa, so he was treated with 4 weeks of cipro per Transplant ID and CVTS.  He developed leukopenia and moderate neutropenia, so his cellcept was held then restarted at low dose, and valcyte was stopped.   "He received neupogen x 1.  He ultimately discharged with a wound vac 10/5.     He presented to the ED 12/9 with right vision loss.  Ophthalmology was consulted, and found that he had a vitreous hemorrhage in his right eye and total retinal detachment of his left eye.  He was then seen in the eye clinic 12/10, where he was advised an Avastin injection in the right eye (for proliferative diabetic retinopathy and vitreous hemorrhage).  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  He has no DSAs.  Recent AlloMap scores have remained < 36.  Baseline coronary angiogram has been deferred.  Last TTE 1/2021 showed normal graft function, with LVEF 55-60% and normal RV size/function.  Last RHC 1/2021 showed normal biventricular filling pressures, with RA 2, mPA 16, PCW 10, and CI 2.87.     Since his last visit, he states that he feels well.  He has remained active, walking daily and lifting weights with no exertional concerns.  He feels like his legs are still weak, but does note continued improvement overall.  His breathing has been good, and he denies SOB, PND, and orthopnea.  His appetite has been good, and he is eating regularly without nausea, vomiting, diarrhea, and constipation.  His weight has been stable, ranging ~178-180#.  His LE edema has improved.  His BPs remain < 140/90.  He otherwise denies chest pain, palpitations, dizziness, headaches, acute vision changes, fevers, chills, cough, sore throat, and signs of bleeding.      Serostatus:  CMV D+/R+  EBV D+/R+  Toxo D+/R-    Patient Active Problem List    Diagnosis Date Noted     Cardiogenic shock (H) 02/04/2021     Priority: Medium     Immunodeficiency, unspecified (H) 02/02/2021     Priority: Medium     CKD (chronic " kidney disease) stage 4, GFR 15-29 ml/min (H) 02/02/2021     Priority: Medium     HIT (heparin-induced thrombocytopenia) (H) 02/02/2021     Priority: Medium     Traction detachment of left retina 12/14/2020     Priority: Medium     Added automatically from request for surgery [id number]       Pseudomonas infection 10/05/2020     Priority: Medium     Need for prophylactic antibiotic 10/05/2020     Priority: Medium     Trina-Barr virus viremia 10/05/2020     Priority: Medium     Sepsis (H) 09/22/2020     Priority: Medium     Hyperglycemia 08/24/2020     Priority: Medium     Heart transplant, orthotopic, status (H) 07/20/2020     Priority: Medium     CHF (congestive heart failure) (H) 07/03/2020     Priority: Medium     Acute on chronic systolic congestive heart failure (H) 07/03/2020     Priority: Medium     Added automatically from request for surgery [id number]       Heart failure (H) 07/03/2020     Priority: Medium     Added automatically from request for surgery [id number]       Dental caries 07/03/2020     Priority: Medium     Added automatically from request for surgery [id number]       Heart replaced by transplant (H) 07/03/2020     Priority: Medium     Added automatically from request for surgery [id number]       Status post coronary angiogram 07/02/2020     Priority: Medium     Coronary artery disease involving native coronary artery of native heart without angina pectoris 06/18/2020     Priority: Medium     Added automatically from request for surgery [id number]       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, unspecified whether long term insulin use (H) 11/27/2019     Priority: Medium     Added automatically from request for surgery [id number]       Nuclear cataract, nonsenile 11/27/2019     Priority: Medium     Added automatically from request for surgery [id number]       Heart transplant candidate 07/18/2019     Priority: Medium     Systolic heart failure (H) 05/14/2019      Priority: Medium     Added automatically from request for surgery [id number]       CHANTLE (obstructive sleep apnea)- severe (AHI 30) 10/06/2016     Priority: Medium     Study Date: 10/03/2016- (196.0 lbs) apnea/hypopnea index 30.8. REM AHI 40,  supine AHI n/a. RDI  33.1 . Lowest oxygen saturation 82.8%.  Time spent less than or equal to 88% 4.9 minutes.  Time spent less than or equal to 89% 7.3 minutes. CPAP final  pressure 7 cmH2O with AHI of 0.8.  Time in REM supine on final pressure 0 minutes. No consolidated sleep seen in supine position. During diagnostic portion of study, PLM index 18.2. During treatment portion of study,  PLM index 19.3.             Moderate nonproliferative diabetic retinopathy, without macular edema, associated with type 2 diabetes mellitus 08/16/2016     Priority: Medium     Cortical cataract of both eyes 08/15/2016     Priority: Medium     Secondary renal hyperparathyroidism (H) 07/26/2016     Priority: Medium     Proteinuria 03/18/2016     Priority: Medium     Vitamin D deficiency 03/18/2016     Priority: Medium     OA (osteoarthritis) of knee 03/18/2016     Priority: Medium     Chondromalacia of patella, unspecified laterality 03/18/2016     Priority: Medium     Pain in joint of left shoulder 03/18/2016     Priority: Medium     Tinnitus 03/18/2016     Priority: Medium     left        Ptosis of right eyelid 03/18/2016     Priority: Medium     Chronic systolic heart failure (H)      Priority: Medium     Echo 7/2015 LVEF 18%       Automatic implantable cardioverter-defibrillator - [employer] single lead ICD, Not Dependent 08/18/2015     Priority: Medium     Health Care Home 01/19/2015     Priority: Medium     *See Letters for [facility] Care Plan: Emergency Care Plan         CKD (chronic kidney disease) stage 3, GFR 30-59 ml/min 01/28/2013     Priority: Medium     CHF (NYHA class II, ACC/AHA stage C) (H) 08/15/2012     Priority: Medium     Hypertension goal BP (blood pressure) < 140/90  01/21/2011     Priority: Medium     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) 10/31/2010     Priority: Medium     Goal <8%       Hyperlipidemia LDL goal <100 10/31/2010     Priority: Medium     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 (H) 03/15/2010     Priority: Medium     MI and open heart with 1 stent placed in 2008; another minor MI in 2009.  MI on 2/19/15. Coronary angiogram from [facility] on 2/19/15 showed severe 3 vessel native CAD (all three vessels [LAD, LCx and RCA] reported to be 100% ocludded at their ostia). All his grafts were considered to be occluded except for LIMA-to-LAD, which was patent and supplied left-to-left, as well as, left-to-right collaterals. There were no targets suitable for revascularization and patient was put on medical therapy.        Morbid obesity (H) 09/26/2016     Priority: Low       PAST MEDICAL HISTORY:  Past Medical History:   Diagnosis Date     CAD (coronary artery disease)      CHF (congestive heart failure) (H)      CKD (chronic kidney disease), stage III      Cortical cataract of both eyes      Diabetes (H)      Hyperlipidemia      Hypertension      Infection due to Streptococcus mitis group 09/23/2020     Ischemic cardiomyopathy      Obesity      CHANTEL (obstructive sleep apnea)     occas cpap     Osteoarthritis        CURRENT MEDICATIONS:  Prescription Medications as of 3/2/2021       Rx Number Disp Refills Start End Last Dispensed Date Next Fill Date Owning Pharmacy    acetaminophen (TYLENOL) 325 MG tablet  60 tablet 3 9/2/2020    [facility] - [place], [place] - [address]    Sig: Take 3 tablets (975 mg) by mouth every 8 hours as needed for mild pain    Class: E-Prescribe    Route: Oral    Alcohol Swabs PADS  100 each 0 8/3/2020    [facility] - [place], [place] - [address]    Sig: Use to swab the area of the  injection or sergio as directed   Per insurance coverage    Class: Local Print    aspirin (ASA) 81 MG chewable tablet  120 tablet 0 10/6/2020    [facility] [place], [place] - [address]    Sig: Take 1 tablet (81 mg) by mouth daily    Class: E-Prescribe    Route: Oral    atropine 1 % ophthalmic solution            Sig: Place 1 drop Into the left eye daily    Class: Historical    Route: Left Eye    blood glucose (NO BRAND SPECIFIED) test strip  400 strip 3 2021    [facility] #[id number] [address] AT [address]    Sig: Use to test blood sugar 4 times daily or as directed.    Class: E-Prescribe    blood glucose monitoring (ACCU-CHEK FELIPE SMARTVIEW) meter device kit  1 kit 0 2017    [facility] [place] [facility] [address] [place] [address]    Sig: Use to test blood sugar 3-4 times daily, as directed.    Class: E-Prescribe    blood glucose monitoring (SOFTCLIX) lancets  400 each 11 2021    [facility] #[id number] [facility] [address] AT [address]    Si each 4 times daily Use to test blood sugars 2 times daily.    Class: E-Prescribe    Route: Does not apply    calcium carbonate 600 mg-vitamin D 400 units (CALTRATE) 600-400 MG-UNIT per tablet  180 tablet 3 2020    [facility]    Sig: Take 1 tablet by mouth 2 times daily (with meals)    Class: E-Prescribe    Route: Oral    furosemide (LASIX) 20 MG tablet  90 tablet 3 10/12/2020    [facility]    Sig: Take 1 tablet (20 mg) by mouth daily    Class: E-Prescribe    Route: Oral    HUMALOG KWIKPEN 100 UNIT/ML soln  90 mL 1 11/10/2020    [facility] #[id number] - [facility] [address]    Sig: Inject 0-31 Units Subcutaneous 3 times daily (with meals) + Custom MEAL and SNACK corrective dosing    Approx 90 units daily max    Class: E-Prescribe    Cosign for Ordering: Accepted by Marisabel Prieto PA-C on 2020 10:04 PM    hydrALAZINE (APRESOLINE) 100 MG tablet  270 tablet 3 2020    [facility] [address]    Sig: Take 1 tablet (100 mg) by mouth every 8 hours    Class: E-Prescribe    Route: Oral    insulin aspart (NOVOLOG PEN) 100 UNIT/ML pen  6 mL 0 10/5/2020    [address]    Sig: Inject 0-31 Units Subcutaneous 3 times daily (with meals) Custom MEAL and SNACK corrective dosing     BG less than 80, do not give Novolog.  BG , give  15 units.  -169, give  17 units.  -199 give 19 units.  -229 give 21 units.  -259 give 23 units.  -289 give 25 units.  -319 give 27 units.  -349 give 29 units.  BG >/= 350 give 31 units.  To be given with meal based on pre-meal blood glucose    Class: Local Print    Route: Subcutaneous    insulin isophane human (HUMULIN N PEN) 100 UNIT/ML injection    2020    [id number] [place] [id number] AT [facility] [address] [facility]    Sig: Inject 35 Units Subcutaneous every morning    Class: No Print Out    Route: Subcutaneous    insulin isophane human (HUMULIN N PEN) 100 UNIT/ML injection  3 mL 1 10/5/2020    [address]    Sig: Inject 8 Units Subcutaneous At Bedtime    Class: E-Prescribe    Route: Subcutaneous    insulin pen needle (32G X 4 MM) 32G X 4 MM miscellaneous  100 each 0 8/3/2020    [address]    Sig: Use as directed by provider  Per insurance coverage    Class: Local Print    insulin pen needle (B-D U/F) 31G X 5 MM miscellaneous  100 each 3 2020    [facility] [address]    Si Units by Device route 2 times daily  Use 2 pen needles daily or as directed.    Class: E-Prescribe    Route: Device    insulin pen needle (NOVOFINE) 30G X 8 MM miscellaneous  200 each 3 8/18/2020    [facility] #[id number] - [place], [place] - [address] AT [facility]    Sig: Inject 1 Box Subcutaneous 6 times daily Use 6 pen needles daily or as directed.    Class: E-Prescribe    Notes to Pharmacy: Has both Novolog Flex pen and several Humalog Kwik pen, but doesn't have correct needle.    Route: Subcutaneous    latanoprost (XALATAN) 0.005 % ophthalmic solution  15 mL 4 1/14/2021    [facility] #[id number] - [place], [place] - [address] AT [facility] & [facility]    Sig: INSTILL 1 DROP IN BOTH EYES AT BEDTIME    Class: E-Prescribe    Notes to Pharmacy: **Patient requests 90 days supply**    magnesium oxide 400 MG CAPS  180 capsule 3 9/22/2020    [facility] - [place], [place] - [facility] [address]    Sig: Take 400 mg by mouth 2 times daily    Class: E-Prescribe    Route: Oral    mycophenolate (GENERIC EQUIVALENT) 250 MG capsule  240 capsule 11 11/27/2020    [facility] - [place] [address]    Sig: Take 4 capsules (1,000 mg) by mouth 2 times daily    Class: E-Prescribe    Notes to Pharmacy: TXP DT 7/19/2020 (Heart) TXP Dischg DT  DX Heart replaced by transplant Z94.1 TX Center [facility] ([place], [place])    Route: Oral    neomycin-polymixin-dexamethasone (MAXITROL) 0.1 % ophthalmic suspension  5 mL 0 12/21/2020    [facility] [place], [place] - [address]    Sig: Apply 1 drop to eye 4 times daily Instill into operative eye(s) per physician instructions.    Class: E-Prescribe    Route: Ophthalmic    neomycin-polymixin-dexamethasone (MAXITROL) ophthalmic ointment            Sig: Place 1 Application Into the left eye At Bedtime    Class: Historical    Route: Left Eye    order for DME  1 Units 0 1/11/2017        Sig:  Auto CPAP 8-15 cmH20    Class: Sleep Center    pantoprazole (PROTONIX) 40 MG EC tablet    11/6/2020    [facility] - [place] [address] [address]     Class: Historical    prednisoLONE acetate (PRED FORTE) 1 % ophthalmic suspension  1 Bottle 1 1/14/2021    [facility] #[id number] - [place], [place] - [address] AT [facility] OF [address] [facility]    Sig: Place 1-2 drops Into the left eye 2 times daily    Class: E-Prescribe    Notes to Pharmacy: use 1 drop left eye 2 x daily x 1 week, then 1 drop 1 x daily x 1 week, then stop    Route: Left Eye    rosuvastatin (CRESTOR) 10 MG tablet  90 tablet 3 8/18/2020    [facility] [place] [address] [address]     Sig: Take 1 tablet (10 mg) by mouth daily    Class: E-Prescribe    Route: Oral    senna-docusate (SENOKOT-S/PERICOLACE) 8.6-50 MG tablet  60 tablet 0 10/5/2020    [facility] - [place], [place] - [address] [place] [address] [address]    Sig: Take 1 tablet by mouth 2 times daily (hold for loose stools)    Class: E-Prescribe    Route: Oral    sulfamethoxazole-trimethoprim (BACTRIM) 400-80 MG tablet  90 tablet 3 8/18/2020    [facility] [address]     Sig: Take 1 tablet by mouth daily    Class: E-Prescribe    Route: Oral    tacrolimus (GENERIC EQUIVALENT) 1 MG capsule  300 capsule 11 1/7/2021    [facility] [address]    Sig: Take 5 capsules (5 mg) by mouth every morning AND 5 capsules (5 mg) every evening.    Class: E-Prescribe    Notes to Pharmacy: TXP DT 7/19/2020 (Heart), 7/19/2020 (Heart) TXP Dischg DT 8/3/2020 DX Heart transplant Z94.1 TX Center [facility]    Route: Oral    tamsulosin (FLOMAX) 0.4 MG capsule  60 capsule 0 10/6/2020    [facility] [place], [place] - [address] [place] [address]    Sig: Take 1 capsule (0.4 mg) by mouth daily    Class: E-Prescribe    Route:  "Oral    Continuous Blood Gluc Sensor (FREESTYLE JEREMY 14 DAY SENSOR) MISC  2 each 11 8/28/2020    [facility]    Sig: Change every 14 days.    Class: Local Print    Continuous Blood Gluc Sensor (FREESTYLE JEREMY 14 DAY SENSOR) MISC  6 each 4 8/18/2020    [facility] #[id number] - [place] - [address] AT [facility] [address]    Sig: Change every 14 days.    Class: E-Prescribe    Notes to Pharmacy: Use to check BG 5 times daily.      Clinic-Administered Medications as of 3/2/2021       Dose Frequency Start End    bevacizumab (AVASTIN) intravitreal inj 1.25 mg 1.25 mg EVERY 28 DAYS 12/10/2020 11/11/2021    Admin Instructions: Prn every 3-52 weeks<BR>MAYE Rodriguez <BR>[phone]<BR>re    Route: Intravitreal          ROS:   Constitutional: No fever, chills, or sweats. Stable weight.   ENT: No new visual disturbance, ear ache, epistaxis, sore throat.   Allergies/Immunologic: Negative.   Respiratory: As per HPI.  Cardiovascular: As per HPI.   GI: No nausea, vomiting, hematemesis, melena, or hematochezia.   : No urinary frequency, dysuria, or hematuria.   Integument: Negative.   Psychiatric: Negative.   Neuro: Negative.   Endocrinology: Negative.   Musculoskeletal: As per HPI.    Exam:  /76 (BP Location: Right arm, Patient Position: Chair, Cuff Size: Adult Regular)   Pulse 86   Ht 1.651 m (5' 5\")   Wt 81.2 kg (179 lb)   SpO2 97%   BMI 29.79 kg/m    In general, the patient is a pleasant male in no apparent distress.    HEENT: NC/AT. PERRLA. EOMI.  Sclerae white, not injected.    Neck:  No adenopathy, No thyromegaly.    COR: No jugular venous distention when sitting upright.  RRR.  Normal S1 S2 splits physiologically.  No murmur, rub click, or gallop.    Lungs:  CTA. No rhonchi.    Abdomen: soft, nontender, nondistended.  No organomegaly.  Extremities:  No clubbing or cyanosis, bilateral LE edema with pitting up to mid-calf.  Neuro: Alert & Oriented x 3, grossly non focal.  Integument: no " open lesions, rashes, or jaundice.    Labs:  CBC RESULTS:   Lab Results   Component Value Date    WBC 4.5 03/02/2021    RBC 4.17 (L) 03/02/2021    HGB 12.3 (L) 03/02/2021    HCT 39.3 (L) 03/02/2021    MCV 94 03/02/2021    MCH 29.5 03/02/2021    MCHC 31.3 (L) 03/02/2021    RDW 14.0 03/02/2021     03/02/2021       BMP RESULTS:  Lab Results   Component Value Date     03/02/2021    POTASSIUM 4.2 03/02/2021    CHLORIDE 108 03/02/2021    CO2 27 03/02/2021    ANIONGAP 7 03/02/2021     (H) 03/02/2021    BUN 36 (H) 03/02/2021    CR 1.83 (H) 03/02/2021    GFRESTIMATED 37 (L) 03/02/2021    GFRESTBLACK 43 (L) 03/02/2021    BRYANNA 9.2 03/02/2021      LIPID RESULTS:  Lab Results   Component Value Date    CHOL 133 01/05/2021    HDL 40 01/05/2021    LDL 58 01/05/2021    TRIG 179 (H) 01/05/2021    CHOLHDLRATIO 2.6 06/27/2015    NHDL 94 01/05/2021       IMMUNOSUPPRESSANT LEVELS  Lab Results   Component Value Date    TACROL 6.7 01/14/2021    DOSTAC 01/13/21  2000 01/14/2021       No components found for: CK  Lab Results   Component Value Date    MAG 1.7 03/02/2021     Lab Results   Component Value Date    A1C 6.7 (H) 01/14/2021     Lab Results   Component Value Date    PHOS 3.4 03/02/2021     Lab Results   Component Value Date    NTBNPI 8,792 (H) 09/22/2020     Lab Results   Component Value Date    SAITESTMET SA FCS 01/05/2021    SAICELL Class I 01/05/2021    [id number] None 01/05/2021    [id number] None 01/05/2021    SAIREPCOM  01/05/2021      Test performed by modified procedure. Serum heat inactivated and tested   by a modified ([place]) protocol including fetal calf serum addition.   High-risk, mfi >3,000. Mod-risk, mfi 500-3,000.       Lab Results   Component Value Date    SAIITESTME SA FCS 01/05/2021    SAIICELL Class II 01/05/2021    [id number] None 01/05/2021    [id number] None 01/05/2021    SAIIREPCOM  01/05/2021      Test performed by modified procedure. Serum heat inactivated and tested   by a modified ([place])  protocol including fetal calf serum addition.   High-risk, mfi >3,000. Mod-risk, mfi 500-3,000.       Lab Results   Component Value Date    CSPEC EDTA PLASMA 01/05/2021       Assessment and Plan:  Mr. Lucian Henderson is a 65yr old male with a history of CAD (s/p 3v CABG 2008), ICM s/p OHT 7/19/20, DMII, CKD, and HTN who presents to clinic for routine follow up.  A professional  was used for this visit.     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  He ultimately discharged 8/3.    He has been readmitted for hyperglycemia (8/2020) and wound infection (s/p I&D 9/23/20).  He has been following with the eye clinic, as noted below.      His biopsies have remained negative for rejection.  He has no DSAs.  Recent AlloMap scores have remained < 36.  Baseline coronary angiogram has been deferred.  Last TTE 1/2021 showed normal graft function, with LVEF 55-60% and normal RV size/function.  Last RHC 1/2021 showed normal biventricular filling pressures, with RA 2, mPA 16, PCW 10, and CI 2.87.    Labs today show stable electrolytes, renal function, and blood counts.  AlloMap and tacro levels are in process.    TTE today shows stable graft function, with LVEF 55-60% and normal RV size/function.    Mr. Tee Henderson appears well today.  His BPs remain stable.  His weight remains stable, and he appears euvolemic.  He remains on lasix due to his LE edema, which appears stable/mildly improved today.    As his creatinine remains elevated, will discuss decreasing bactrim with Dr. Sheridan --> he is to remain on bactrim due to his donor being toxo +.    Otherwise,  "advised that he continue his current meds, with no changes, and will plan for him to return to clinic per protocol or sooner, should new concerns arise.      Serostatus:  - CMV D+/R+  - EBV D+/R+  - Toxo D+/R-     Immunosuppression:  - MMF 1000mg twice daily  - tacro, goal level 6-8.  Level today in process.     PPx:  - CAV:  Aspirin 81mg daily and rosuvastatin 10mg daily  - GI:  Pantoprazole 40mg daily  - Osteoporosis:  Calcium/vitamin D supplements  - Toxo:  Single strength bactrim, lifelong ppx --> will discuss decreasing dose with Dr. Sheridan, due to renal function     Graft function:  - BPs:  Stable, continue hydral 100mg TID  - fluid status:  Euvolemic, taking lasix 20mg daily due to ongoing LE edema.     Health maintenance:  - received flu shot  - needs baseline coronary angiogram --> will do it with his annual per Dr. Sheridan.  - interested in receiving COVID vaccine        DMII  Follows with endo and PCP.     HIT  HIT antibody + 7/2020.  CORRINE +.  No heparin products.     RIJ and axillary vein DVT, nonocclusive  Right cephalic vein occlusive thrombus  S/p course of anticoagulation.     Total retinal detachment of the left eye, s/p retinectomy 12/21/20  Proliferative diabetic retinopathy  Right vitreous hemorrhage  He presented to the ED 12/9 with right vision loss.  Ophthalmology was consulted, and found that he had a vitreous hemorrhage in his right eye and total retinal detachment of his left eye.  He was then seen in the eye clinic 12/10, where he was advised an Avastin injection in the right eye (for proliferative diabetic retinopathy and vitreous hemorrhage).  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        The above " was reviewed with Mr. Tee Henderson via professional .  He verbalized understanding and will call with further questions/concerns.    45 minutes spent with patient, along with preparing for visit, reviewing follow up, and completing documentation.      Arlin Guajardo DNP, FNP-BC, CHFN  Advanced Heart Failure Nurse Practitioner  [facility]  Patient Care Team:  No Ref-Primary, Physician as PCP - Diane Groves APRN CNP as Nurse Practitioner (Cardiology)  Arvin Sheridan MD as MD (Cardiology)  Yue Dowd MD as MD (INTERNAL MEDICINE - ENDOCRINOLOGY, DIABETES & METABOLISM)  Christelle Pettit, RN as Transplant Coordinator (Cardiology)  Negrito Rai [facility] as Pharmacist (Pharmacist)  [facility] [place] ([facility] ([facility] ([place]  Triny Bunch MD as Assigned Surgical Provider  Arvin Sheridan MD as Assigned Heart and Vascular Provider  Anna Archuleta PA-C as Assigned PCP  ARVIN SHERIDAN

## 2021-03-02 NOTE — TELEPHONE ENCOUNTER
insulin isophane human (HUMULIN N PEN) 100 UNIT/ML injection  Inject 35 Units Subcutaneous every morning   Last Written Prescription Date:  10/5/20  Last Fill Quantity: 3 ml,   # refills: 1  Last Office Visit : 2/2/2021  Future Office visit:  2 months, not yet scheduled    Routing refill request to provider for review/approval because:  Insulin - refilled per clinic

## 2021-03-02 NOTE — NURSING NOTE
Chief Complaint   Patient presents with     Follow Up      month post transplant      Vitals were taken and medications were reconciled.     Trudi Carr RMA  10:03 AM

## 2021-03-02 NOTE — LETTER
3/2/2021      RE: Lucian Henderson Sr.  [address] [facility] [address]       Dear Colleague,    Thank you for the opportunity to participate in the care of your patient, Lucian Henderson Sr., at the [facility] [place] [facility]. Please see a copy of my visit note below.    ADULT HEART TRANSPLANT CLINIC  March 2, 2021    HPI:   Mr. Lucian Henderson is a 65yr old male with a history of CAD (s/p 3v CABG 2008), ICM s/p OHT 7/19/20, DMII, CKD, and HTN who presents to clinic for routine follow up.  A professional  was used for this visit.     His post-operative course was c/b primary graft dysfunction (LVEF 20-25% and severe RV dysfunction, thought to be secondary to prolonged ischemic time, resolved by f/up TTE 7/27), VT, pericardial effusion (incidentally found per TTE 7/27, decreased size noted on TTE 7/29), donor streptococcus salivarius bacteremia (s/p 7d course of ceftriaxone), and RUE DVT c/b HIT (s/p PLEX).  He ultimately discharged 8/3.     He was readmitted to [facility] 8/24 with hyperglycemia (BGs 500s).  He did not have any evidence of DKA, HHS, infection, or acute illness.  Endocrine was consulted, and it was discovered that he may have been incorrectly administering insulin at home.  He was also found to be anemic, thought to be secondary to bone marrow suppression.  Peripheral smear 8/24 showed markedly slightly macrocytic anemia with preserved erythrocyte regeneration, and no definitive evidence of hemolysis.  He was also found to have subtherapeutic tacro levels, so was started on clotrimazole troches to help boost his levels.  He ultimately discharged 8/28.     He presented to the ED 9/22 with fevers and drainage from his former ICD subclavian site.  Chest/abd CT also showed concerns for cellulitis in his epigastric region.  He underwent surgical debridement of his left infraclavicular wound and  "debridement of midline chest tube wound on 9/23, which was c/b bleeding and required reoperation 9/28 for I&D of hematoma.  Cultures were positive for Pseudomonas aeruginosa, so he was treated with 4 weeks of cipro per Transplant ID and CVTS.  He developed leukopenia and moderate neutropenia, so his cellcept was held then restarted at low dose, and valcyte was stopped.  He received neupogen x 1.  He ultimately discharged with a wound vac 10/5.     He presented to the ED 12/9 with right vision loss.  Ophthalmology was consulted, and found that he had a vitreous hemorrhage in his right eye and total retinal detachment of his left eye.  He was then seen in the eye clinic 12/10, where he was advised an Avastin injection in the right eye (for proliferative diabetic retinopathy and vitreous hemorrhage).  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  He has no DSAs.  Recent AlloMap scores have remained < 36.  Baseline coronary angiogram has been deferred.  Last TTE 1/2021 showed normal graft function, with LVEF 55-60% and normal RV size/function.  Last RHC 1/2021 showed normal biventricular filling pressures, with RA 2, mPA 16, PCW 10, and CI 2.87.     Since his last visit, he states that he feels well.  He has remained active, walking daily and lifting weights with no exertional concerns.  He feels like his legs are still weak, but does note continued improvement overall.  His breathing has been good, and he denies SOB, PND, and orthopnea.  His appetite has been good, and he is eating regularly without nausea, vomiting, diarrhea, and constipation.  His weight has been stable, ranging ~178-180#.  His LE edema has improved.  His BPs remain < 140/90.  He otherwise denies chest pain, " palpitations, dizziness, headaches, acute vision changes, fevers, chills, cough, sore throat, and signs of bleeding.      Serostatus:  CMV D+/R+  EBV D+/R+  Toxo D+/R-    Patient Active Problem List    Diagnosis Date Noted     Cardiogenic shock (H) 02/04/2021     Priority: Medium     Immunodeficiency, unspecified (H) 02/02/2021     Priority: Medium     CKD (chronic kidney disease) stage 4, GFR 15-29 ml/min (H) 02/02/2021     Priority: Medium     HIT (heparin-induced thrombocytopenia) (H) 02/02/2021     Priority: Medium     Traction detachment of left retina 12/14/2020     Priority: Medium     Added automatically from request for surgery [id number]       Pseudomonas infection 10/05/2020     Priority: Medium     Need for prophylactic antibiotic 10/05/2020     Priority: Medium     Trina-Barr virus viremia 10/05/2020     Priority: Medium     Sepsis (H) 09/22/2020     Priority: Medium     Hyperglycemia 08/24/2020     Priority: Medium     Heart transplant, orthotopic, status (H) 07/20/2020     Priority: Medium     CHF (congestive heart failure) (H) 07/03/2020     Priority: Medium     Acute on chronic systolic congestive heart failure (H) 07/03/2020     Priority: Medium     Added automatically from request for surgery [id number]       Heart failure (H) 07/03/2020     Priority: Medium     Added automatically from request for surgery [id number]       Dental caries 07/03/2020     Priority: Medium     Added automatically from request for surgery [id number]       Heart replaced by transplant (H) 07/03/2020     Priority: Medium     Added automatically from request for surgery [id number]       Status post coronary angiogram 07/02/2020     Priority: Medium     Coronary artery disease involving native coronary artery of native heart without angina pectoris 06/18/2020     Priority: Medium     Added automatically from request for surgery [id number]       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,  unspecified whether long term insulin use (H) 11/27/2019     Priority: Medium     Added automatically from request for surgery [id number]       Nuclear cataract, nonsenile 11/27/2019     Priority: Medium     Added automatically from request for surgery [id number]       Heart transplant candidate 07/18/2019     Priority: Medium     Systolic heart failure (H) 05/14/2019     Priority: Medium     Added automatically from request for surgery [id number]       CHANTEL (obstructive sleep apnea)- severe (AHI 30) 10/06/2016     Priority: Medium     Study Date: 10/03/2016- (196.0 lbs) apnea/hypopnea index 30.8. REM AHI 40,  supine AHI n/a. RDI  33.1 . Lowest oxygen saturation 82.8%.  Time spent less than or equal to 88% 4.9 minutes.  Time spent less than or equal to 89% 7.3 minutes. CPAP final  pressure 7 cmH2O with AHI of 0.8.  Time in REM supine on final pressure 0 minutes. No consolidated sleep seen in supine position. During diagnostic portion of study, PLM index 18.2. During treatment portion of study,  PLM index 19.3.             Moderate nonproliferative diabetic retinopathy, without macular edema, associated with type 2 diabetes mellitus 08/16/2016     Priority: Medium     Cortical cataract of both eyes 08/15/2016     Priority: Medium     Secondary renal hyperparathyroidism (H) 07/26/2016     Priority: Medium     Proteinuria 03/18/2016     Priority: Medium     Vitamin D deficiency 03/18/2016     Priority: Medium     OA (osteoarthritis) of knee 03/18/2016     Priority: Medium     Chondromalacia of patella, unspecified laterality 03/18/2016     Priority: Medium     Pain in joint of left shoulder 03/18/2016     Priority: Medium     Tinnitus 03/18/2016     Priority: Medium     left        Ptosis of right eyelid 03/18/2016     Priority: Medium     Chronic systolic heart failure (H)      Priority: Medium     Echo 7/2015 LVEF 18%       Automatic implantable cardioverter-defibrillator - [employer] single lead ICD, Not Dependent  08/18/2015     Priority: Medium     Health Care Home 01/19/2015     Priority: Medium     *See Letters for [facility] Care Plan: Emergency Care Plan         CKD (chronic kidney disease) stage 3, GFR 30-59 ml/min 01/28/2013     Priority: Medium     CHF (NYHA class II, ACC/AHA stage C) (H) 08/15/2012     Priority: Medium     Hypertension goal BP (blood pressure) < 140/90 01/21/2011     Priority: Medium     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) 10/31/2010     Priority: Medium     Goal <8%       Hyperlipidemia LDL goal <100 10/31/2010     Priority: Medium     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 (H) 03/15/2010     Priority: Medium     MI and open heart with 1 stent placed in 2008; another minor MI in 2009.  MI on 2/19/15. Coronary angiogram from [facility] on 2/19/15 showed severe 3 vessel native CAD (all three vessels [LAD, LCx and RCA] reported to be 100% ocludded at their ostia). All his grafts were considered to be occluded except for LIMA-to-LAD, which was patent and supplied left-to-left, as well as, left-to-right collaterals. There were no targets suitable for revascularization and patient was put on medical therapy.        Morbid obesity (H) 09/26/2016     Priority: Low       PAST MEDICAL HISTORY:  Past Medical History:   Diagnosis Date     CAD (coronary artery disease)      CHF (congestive heart failure) (H)      CKD (chronic kidney disease), stage III      Cortical cataract of both eyes      Diabetes (H)      Hyperlipidemia      Hypertension      Infection due to Streptococcus mitis group 09/23/2020     Ischemic cardiomyopathy      Obesity      CHANTEL (obstructive sleep apnea)     occas cpap     Osteoarthritis        CURRENT MEDICATIONS:  Prescription Medications as of 3/2/2021       Rx Number Disp Refills Start End Last Dispensed Date Next Fill Date Owning Pharmacy    acetaminophen (TYLENOL) 325 MG tablet  60  tablet 3 2020    [facility] [facility]    Sig: Take 3 tablets (975 mg) by mouth every 8 hours as needed for mild pain    Class: E-Prescribe    Route: Oral    Alcohol Swabs PADS  100 each 0 8/3/2020    [place], [place] - [address] [place] [address]    Sig: Use to swab the area of the injection or sergio as directed   Per insurance coverage    Class: Local Print    aspirin (ASA) 81 MG chewable tablet  120 tablet 0 10/6/2020    [place], [place] - [address] [place] [address]    Sig: Take 1 tablet (81 mg) by mouth daily    Class: E-Prescribe    Route: Oral    atropine 1 % ophthalmic solution            Sig: Place 1 drop Into the left eye daily    Class: Historical    Route: Left Eye    blood glucose (NO BRAND SPECIFIED) test strip  400 strip 3 2021    [facility] [employer] [facility] #[id number] [facility] [address] AT [address]    Sig: Use to test blood sugar 4 times daily or as directed.    Class: E-Prescribe    blood glucose monitoring (ACCU-CHEK FELIPE SMARTVIEW) meter device kit  1 kit 0 2017    [address]    Sig: Use to test blood sugar 3-4 times daily, as directed.    Class: E-Prescribe    blood glucose monitoring (SOFTCLIX) lancets  400 each 11 2021    [facility] [employer] [facility] #[id number] [facility], [place] - [address] AT [address]    Si each 4 times daily Use to test blood sugars 2 times daily.    Class: E-Prescribe    Route: Does not apply    calcium carbonate 600 mg-vitamin D 400 units (CALTRATE) 600-400 MG-UNIT per tablet  180 tablet 3 2020    [facility] [address] [address]     Sig: Take 1 tablet by mouth 2 times daily (with meals)    Class: E-Prescribe    Route: Oral    furosemide (LASIX) 20 MG tablet  90 tablet 3 10/12/2020    [place] [facility], [place] -  [address]     Sig: Take 1 tablet (20 mg) by mouth daily    Class: E-Prescribe    Route: Oral    HUMALOG KWIKPEN 100 UNIT/ML soln  90 mL 1 11/10/2020    [id number] [address] AT [address]    Sig: Inject 0-31 Units Subcutaneous 3 times daily (with meals) + Custom MEAL and SNACK corrective dosing   Approx 90 units daily max    Class: E-Prescribe    Cosign for Ordering: Accepted by Marisabel Prieto PA-C on 11/19/2020 10:04 PM    hydrALAZINE (APRESOLINE) 100 MG tablet  270 tablet 3 11/13/2020    [place] [facility] [address] [facility]    Sig: Take 1 tablet (100 mg) by mouth every 8 hours    Class: E-Prescribe    Route: Oral    insulin aspart (NOVOLOG PEN) 100 UNIT/ML pen  6 mL 0 10/5/2020    [address]    Sig: Inject 0-31 Units Subcutaneous 3 times daily (with meals) Custom MEAL and SNACK corrective dosing     BG less than 80, do not give Novolog.  BG , give  15 units.  -169, give  17 units.  -199 give 19 units.  -229 give 21 units.  -259 give 23 units.  -289 give 25 units.  -319 give 27 units.  -349 give 29 units.  BG >/= 350 give 31 units.  To be given with meal based on pre-meal blood glucose    Class: Local Print    Route: Subcutaneous    insulin isophane human (HUMULIN N PEN) 100 UNIT/ML injection    11/30/2020    [facility][id number] - [facility] [address] AT [address]    Sig: Inject 35 Units Subcutaneous every morning    Class: No Print Out    Route: Subcutaneous    insulin isophane human (HUMULIN N PEN) 100 UNIT/ML injection  3 mL 1 10/5/2020    [facility] [address]    Sig: Inject 8 Units Subcutaneous At Bedtime    Class: E-Prescribe    Route: Subcutaneous    insulin pen needle (32G X 4 MM) 32G X 4 MM miscellaneous  100 each 0 8/3/2020     [facility] [place], [place] - [address]    Sig: Use as directed by provider  Per insurance coverage    Class: Local Print    insulin pen needle (B-D U/F) 31G X 5 MM miscellaneous  100 each 3 2020    [facility] - [place] [address]    Si Units by Device route 2 times daily Use 2 pen needles daily or as directed.    Class: E-Prescribe    Route: Device    insulin pen needle (NOVOFINE) 30G X 8 MM miscellaneous  200 each 3 2020    [facility] #[id number] [facility] [address] AT [facility]    Sig: Inject 1 Box Subcutaneous 6 times daily Use 6 pen needles daily or as directed.    Class: E-Prescribe    Notes to Pharmacy: Has both Novolog Flex pen and several Humalog Kwik pen, but doesn't have correct needle.    Route: Subcutaneous    latanoprost (XALATAN) 0.005 % ophthalmic solution  15 mL 4 2021    [facility] #[id number] [facility] [address] AT [facility]    Sig: INSTILL 1 DROP IN BOTH EYES AT BEDTIME    Class: E-Prescribe    Notes to Pharmacy: **Patient requests 90 days supply**    magnesium oxide 400 MG CAPS  180 capsule 3 2020    [facility] [place] [address] [address]    Sig: Take 400 mg by mouth 2 times daily    Class: E-Prescribe    Route: Oral    mycophenolate (GENERIC EQUIVALENT) 250 MG capsule  240 capsule 11 2020    [facility] [place] [address] [address]    Sig: Take 4 capsules (1,000 mg) by mouth 2 times daily    Class: E-Prescribe    Notes to Pharmacy: TXP DT 2020 (Heart) TXP Dischg DT  DX Heart replaced by transplant Z94.1 TX [facility] ([place], [place])    Route: Oral    neomycin-polymixin-dexamethasone (MAXITROL) 0.1 % ophthalmic suspension  5 mL 0 2020    [facility] [place], [place] - [address]    Sig:  Apply 1 drop to eye 4 times daily Instill into operative eye(s) per physician instructions.    Class: E-Prescribe    Route: Ophthalmic    neomycin-polymixin-dexamethasone (MAXITROL) ophthalmic ointment            Sig: Place 1 Application Into the left eye At Bedtime    Class: Historical    Route: Left Eye    order for DME  1 Units 0 1/11/2017        Sig: Auto CPAP 8-15 cmH20    Class: [facility]    pantoprazole (PROTONIX) 40 MG EC tablet    11/6/2020    [facility] [address] [address]    Class: Historical    prednisoLONE acetate (PRED FORTE) 1 % ophthalmic suspension  1 Bottle 1 1/14/2021    [facility] #[id number] - [facility] [address] AT [facility] [address] [facility]    Sig: Place 1-2 drops Into the left eye 2 times daily    Class: E-Prescribe    Notes to Pharmacy: use 1 drop left eye 2 x daily x 1 week, then 1 drop 1 x daily x 1 week, then stop    Route: Left Eye    rosuvastatin (CRESTOR) 10 MG tablet  90 tablet 3 8/18/2020    [facility] [address] [address]    Sig: Take 1 tablet (10 mg) by mouth daily    Class: E-Prescribe    Route: Oral    senna-docusate (SENOKOT-S/PERICOLACE) 8.6-50 MG tablet  60 tablet 0 10/5/2020    [facility] [place], [place] - [address] [facility] [address]    Sig: Take 1 tablet by mouth 2 times daily (hold for loose stools)    Class: E-Prescribe    Route: Oral    sulfamethoxazole-trimethoprim (BACTRIM) 400-80 MG tablet  90 tablet 3 8/18/2020    [facility] [address] [address]    Sig: Take 1 tablet by mouth daily    Class: E-Prescribe    Route: Oral    tacrolimus (GENERIC EQUIVALENT) 1 MG capsule  300 capsule 11 1/7/2021    [facility] [address] [address]    Sig: Take 5 capsules (5 mg) by mouth every morning AND 5 capsules (5 mg) every evening.    Class: E-Prescribe    Notes to Pharmacy: TXP DT 7/19/2020 (Heart),  "7/19/2020 (Heart) TXP Dischg DT 8/3/2020 DX Heart transplant Z94.1 [facility] ([place], [place])    Route: Oral    tamsulosin (FLOMAX) 0.4 MG capsule  60 capsule 0 10/6/2020    [facility] [place] [place] - [address] [facility]    Sig: Take 1 capsule (0.4 mg) by mouth daily    Class: E-Prescribe    Route: Oral    Continuous Blood Gluc Sensor (FREESTYLE JEREMY 14 DAY SENSOR) MISC  2 each 11 8/28/2020    [facility]    Sig: Change every 14 days.    Class: Local Print    Continuous Blood Gluc Sensor (FREESTYLE JREEMY 14 DAY SENSOR) [facility]  6 each 4 8/18/2020    [facility] #[id number] - [place], [place] - [address] AT [facility] OF [address]    Sig: Change every 14 days.    Class: E-Prescribe    Notes to Pharmacy: Use to check BG 5 times daily.      Clinic-Administered Medications as of 3/2/2021       Dose Frequency Start End    bevacizumab (AVASTIN) intravitreal inj 1.25 mg 1.25 mg EVERY 28 DAYS 12/10/2020 11/11/2021    Admin Instructions: Prn every 3-52 weeksEunice Webb, [phone]    Route: Intravitreal          ROS:   Constitutional: No fever, chills, or sweats. Stable weight.   ENT: No new visual disturbance, ear ache, epistaxis, sore throat.   Allergies/Immunologic: Negative.   Respiratory: As per HPI.  Cardiovascular: As per HPI.   GI: No nausea, vomiting, hematemesis, melena, or hematochezia.   : No urinary frequency, dysuria, or hematuria.   Integument: Negative.   Psychiatric: Negative.   Neuro: Negative.   Endocrinology: Negative.   Musculoskeletal: As per HPI.    Exam:  /76 (BP Location: Right arm, Patient Position: Chair, Cuff Size: Adult Regular)   Pulse 86   Ht 1.651 m (5' 5\")   Wt 81.2 kg (179 lb)   SpO2 97%   BMI 29.79 kg/m    In general, the patient is a pleasant male in no apparent distress.    HEENT: NC/AT. PERRLA. EOMI.  Sclerae white, not injected.    Neck:  No adenopathy, No thyromegaly.    COR: No jugular venous " distention when sitting upright.  RRR.  Normal S1 S2 splits physiologically.  No murmur, rub click, or gallop.    Lungs:  CTA. No rhonchi.    Abdomen: soft, nontender, nondistended.  No organomegaly.  Extremities:  No clubbing or cyanosis, bilateral LE edema with pitting up to mid-calf.  Neuro: Alert & Oriented x 3, grossly non focal.  Integument: no open lesions, rashes, or jaundice.    Labs:  CBC RESULTS:   Lab Results   Component Value Date    WBC 4.5 03/02/2021    RBC 4.17 (L) 03/02/2021    HGB 12.3 (L) 03/02/2021    HCT 39.3 (L) 03/02/2021    MCV 94 03/02/2021    MCH 29.5 03/02/2021    MCHC 31.3 (L) 03/02/2021    RDW 14.0 03/02/2021     03/02/2021       BMP RESULTS:  Lab Results   Component Value Date     03/02/2021    POTASSIUM 4.2 03/02/2021    CHLORIDE 108 03/02/2021    CO2 27 03/02/2021    ANIONGAP 7 03/02/2021     (H) 03/02/2021    BUN 36 (H) 03/02/2021    CR 1.83 (H) 03/02/2021    GFRESTIMATED 37 (L) 03/02/2021    GFRESTBLACK 43 (L) 03/02/2021    BRYANNA 9.2 03/02/2021      LIPID RESULTS:  Lab Results   Component Value Date    CHOL 133 01/05/2021    HDL 40 01/05/2021    LDL 58 01/05/2021    TRIG 179 (H) 01/05/2021    CHOLHDLRATIO 2.6 06/27/2015    NHDL 94 01/05/2021       IMMUNOSUPPRESSANT LEVELS  Lab Results   Component Value Date    TACROL 6.7 01/14/2021    DOSTAC 01/13/21  2000 01/14/2021       No components found for: CK  Lab Results   Component Value Date    MAG 1.7 03/02/2021     Lab Results   Component Value Date    A1C 6.7 (H) 01/14/2021     Lab Results   Component Value Date    PHOS 3.4 03/02/2021     Lab Results   Component Value Date    NTBNPI 8,792 (H) 09/22/2020     Lab Results   Component Value Date    SAITESTMET SA FCS 01/05/2021    SAICELL Class I 01/05/2021    [id number] None 01/05/2021    [id number] None 01/05/2021    SAIREPCOM  01/05/2021      Test performed by modified procedure. Serum heat inactivated and tested   by a modified ([place]) protocol including fetal calf  serum addition.   High-risk, mfi >3,000. Mod-risk, mfi 500-3,000.       Lab Results   Component Value Date    SAIITESTME SA FCS 01/05/2021    SAIICELL Class II 01/05/2021    [id number] None 01/05/2021    [id number] None 01/05/2021    SAIIREPCOM  01/05/2021      Test performed by modified procedure. Serum heat inactivated and tested   by a modified ([place]) protocol including fetal calf serum addition.   High-risk, mfi >3,000. Mod-risk, mfi 500-3,000.       Lab Results   Component Value Date    CSPEC EDTA PLASMA 01/05/2021       Assessment and Plan:  Mr. Lucian Henderson is a 65yr old male with a history of CAD (s/p 3v CABG 2008), ICM s/p OHT 7/19/20, DMII, CKD, and HTN who presents to clinic for routine follow up.  A professional  was used for this visit.     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  He ultimately discharged 8/3.    He has been readmitted for hyperglycemia (8/2020) and wound infection (s/p I&D 9/23/20).  He has been following with the eye clinic, as noted below.      His biopsies have remained negative for rejection.  He has no DSAs.  Recent AlloMap scores have remained < 36.  Baseline coronary angiogram has been deferred.  Last TTE 1/2021 showed normal graft function, with LVEF 55-60% and normal RV size/function.  Last RHC 1/2021 showed normal biventricular filling pressures, with RA 2, mPA 16, PCW 10, and CI 2.87.    Labs today show stable electrolytes, renal function, and blood counts.  AlloMap and tacro levels are in process.    TTE today shows stable graft function, with LVEF 55-60% and normal RV size/function.    . Tee  Santiago appears well today.  His BPs remain stable.  His weight remains stable, and he appears euvolemic.  He remains on lasix due to his LE edema, which appears stable/mildly improved today.    As his creatinine remains elevated, will discuss decreasing bactrim with Dr. Sheridan --> he is to remain on bactrim due to his donor being toxo +.    Otherwise, advised that he continue his current meds, with no changes, and will plan for him to return to clinic per protocol or sooner, should new concerns arise.      Serostatus:  - CMV D+/R+  - EBV D+/R+  - Toxo D+/R-     Immunosuppression:  - MMF 1000mg twice daily  - tacro, goal level 6-8.  Level today in process.     PPx:  - CAV:  Aspirin 81mg daily and rosuvastatin 10mg daily  - GI:  Pantoprazole 40mg daily  - Osteoporosis:  Calcium/vitamin D supplements  - Toxo:  Single strength bactrim, lifelong ppx --> will discuss decreasing dose with Dr. Sheridan, due to renal function     Graft function:  - BPs:  Stable, continue hydral 100mg TID  - fluid status:  Euvolemic, taking lasix 20mg daily due to ongoing LE edema.     Health maintenance:  - received flu shot  - needs baseline coronary angiogram --> will do it with his annual per Dr. Sheridan.  - interested in receiving COVID vaccine        DMII  Follows with endo and PCP.     HIT  HIT antibody + 7/2020.  CORRINE +.  No heparin products.     RIJ and axillary vein DVT, nonocclusive  Right cephalic vein occlusive thrombus  S/p course of anticoagulation.     Total retinal detachment of the left eye, s/p retinectomy 12/21/20  Proliferative diabetic retinopathy  Right vitreous hemorrhage  He presented to the ED 12/9 with right vision loss.  Ophthalmology was consulted, and found that he had a vitreous hemorrhage in his right eye and total retinal detachment of his left eye.  He was then seen in the eye clinic 12/10, where he was advised an Avastin injection in the right eye (for proliferative diabetic retinopathy and vitreous  "hemorrhage).  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        The above was reviewed with Mr. Tee Henderson via professional .  He verbalized understanding and will call with further questions/concerns.    45 minutes spent with patient, along with preparing for visit, reviewing follow up, and completing documentation.      Arlin Guajardo, SONA, FNP-BC, CHFN  Advanced Heart Failure Nurse Practitioner  [facility]  Patient Care Team:  No Ref-Primary, Physician as PCP - Diane Groves APRN CNP as Nurse Practitioner (Cardiology)  Arvin Sheridan MD as MD (Cardiology)  Yue Dowd MD as MD (INTERNAL MEDICINE - ENDOCRINOLOGY, DIABETES & METABOLISM)  Christelle Pettit RN as Transplant Coordinator (Cardiology)  Negrito Rai [facility] as Pharmacist (Pharmacist)  [facility] ([facility]  Triny Bunch MD as Assigned Surgical Provider  Anna Archuleta PA-C as Assigned PCP      "

## 2021-03-04 ENCOUNTER — TELEPHONE (OUTPATIENT)
Dept: TRANSPLANT | Facility: CLINIC | Age: 66
End: 2021-03-04

## 2021-03-04 ENCOUNTER — APPOINTMENT (OUTPATIENT)
Dept: INTERPRETER SERVICES | Facility: CLINIC | Age: 66
End: 2021-03-04
Payer: COMMERCIAL

## 2021-03-04 LAB
ALLOMAP SCORE (EXTERNAL): 32
NEGATIVE PREDICTIVE VALUE PERCENT (EXTERNAL): 99 %
POSITIVE PREDICTIVE VALUE PERCENT (EXTERNAL): 2.9 %

## 2021-03-10 DIAGNOSIS — Z94.1 HEART TRANSPLANT, ORTHOTOPIC, STATUS (H): Primary | ICD-10-CM

## 2021-03-11 ENCOUNTER — OFFICE VISIT (OUTPATIENT)
Dept: OPHTHALMOLOGY | Facility: CLINIC | Age: 66
End: 2021-03-11
Attending: OPHTHALMOLOGY
Payer: COMMERCIAL

## 2021-03-11 DIAGNOSIS — E11.3513 TYPE 2 DIABETES MELLITUS WITH PROLIFERATIVE RETINOPATHY OF BOTH EYES AND MACULAR EDEMA, UNSPECIFIED WHETHER LONG TERM INSULIN USE (H): Primary | ICD-10-CM

## 2021-03-11 PROCEDURE — G0463 HOSPITAL OUTPT CLINIC VISIT: HCPCS | Mod: 25

## 2021-03-11 PROCEDURE — 67028 INJECTION EYE DRUG: CPT | Mod: RT | Performed by: OPHTHALMOLOGY

## 2021-03-11 PROCEDURE — 250N000011 HC RX IP 250 OP 636: Performed by: OPHTHALMOLOGY

## 2021-03-11 RX ADMIN — Medication 1.25 MG: at 10:00

## 2021-03-11 ASSESSMENT — TONOMETRY
IOP_METHOD: TONOPEN
OD_IOP_MMHG: 18
OS_IOP_MMHG: 18

## 2021-03-11 ASSESSMENT — EXTERNAL EXAM - LEFT EYE: OS_EXAM: NORMAL

## 2021-03-11 ASSESSMENT — CONF VISUAL FIELD
OS_SUPERIOR_NASAL_RESTRICTION: 1
OD_SUPERIOR_NASAL_RESTRICTION: 3
OD_SUPERIOR_TEMPORAL_RESTRICTION: 3
OS_INFERIOR_NASAL_RESTRICTION: 1
OS_SUPERIOR_TEMPORAL_RESTRICTION: 1
COMMENTS: PTOSIS RIGHT EYE

## 2021-03-11 ASSESSMENT — VISUAL ACUITY
METHOD: SNELLEN - LINEAR
OD_SC+: -2
OD_SC: 20/60
OS_SC: HM
OS_SC: 20/800

## 2021-03-11 ASSESSMENT — EXTERNAL EXAM - RIGHT EYE: OD_EXAM: NORMAL

## 2021-03-11 ASSESSMENT — SLIT LAMP EXAM - LIDS
COMMENTS: PTOSIS OD>OS
COMMENTS: PTOSIS OD>OS

## 2021-03-11 NOTE — NURSING NOTE
Chief Complaints and History of Present Illnesses   Patient presents with     Follow Up      Chief Complaint(s) and History of Present Illness(es)     Follow Up     Laterality: left eye    Associated symptoms: Negative for tearing, redness and eye pain              Comments     4 week follow up of post op PPV/MP/retinectomy left eye 12/21/20 and likely avastin injection right eye.  No change in vision either eye per patient.  No concerns of vision today.  Eye meds: latanoprost at bedtime both eyes @7pm last night, Predforte every other day left eye   VALENTINE Morgan 3/11/2021 9:51 AM

## 2021-03-11 NOTE — PROGRESS NOTES
CC: follow up status post PPV/MP/retinectomy 12/21/20. 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 Denies eye pain.    Right eye: vitreous hemorrhage improving   Left eye: Patient states vision improved in left eye. Now count fingers    Stable     Optical Coherence Tomography:   Right eye: good foveal contour  Left eye: mild Epiretinal membrane; nasal edema; inferior area of subretinal fluid. Irregular retina      previously had an extensive discussion about guarded prognosis of left eye. Patient was found to have funnel RD that despite best efforts, was not able to be . Nonetheless, the temporal folded retina appears flat on exam today and patient is happy with the mild improved vision.    Plan:   - Plan for avastin inj right eye today 02/11/21   - Continue latanoprost at bedtime both eyes   - Predforte every other day left eye   - Retina detachment and endophthalmitis precautions were discussed with the patient and was asked to return if any of the those occur.  - Follow up in 1 month for possible laser right eye; Post op check left eye and exam + likely avastin injection right eye   - Will need Panretinal laser photocoagulation (PRP) filling right eye when vitreous hemorrhage clears up      ~~~~~~~~~~~~~~~~~~~~~~~~~~~~~~~~~~   Complete documentation of historical and exam elements from today's encounter can be found in the full encounter summary report (not reduplicated in this progress note).  I personally obtained the chief complaint(s) and history of present illness.  I confirmed and edited as necessary the review of systems, past medical/surgical history, family history, social history, and examination findings as documented by others; and I examined the patient myself.  I personally reviewed the relevant tests, images, and reports as documented above.  I formulated and edited as necessary the assessment and plan and discussed the findings and management plan with the patient and family and  No resident or fellow assisted with the procedures performed.  I performed the procedures myself.    Triny Bunch MD   of Ophthalmology.  Retina Service   Department of Ophthalmology and Visual Neurosciences   [facility]  Phone: [phone]   Fax: [phone]

## 2021-03-15 ENCOUNTER — APPOINTMENT (OUTPATIENT)
Dept: INTERPRETER SERVICES | Facility: CLINIC | Age: 66
End: 2021-03-15
Payer: COMMERCIAL

## 2021-04-05 DIAGNOSIS — E11.3513 TYPE 2 DIABETES MELLITUS WITH PROLIFERATIVE RETINOPATHY OF BOTH EYES AND MACULAR EDEMA, UNSPECIFIED WHETHER LONG TERM INSULIN USE (H): Primary | ICD-10-CM

## 2021-04-15 ENCOUNTER — OFFICE VISIT (OUTPATIENT)
Dept: OPHTHALMOLOGY | Facility: CLINIC | Age: 66
End: 2021-04-15
Attending: OPHTHALMOLOGY
Payer: COMMERCIAL

## 2021-04-15 DIAGNOSIS — E11.3513 TYPE 2 DIABETES MELLITUS WITH PROLIFERATIVE RETINOPATHY OF BOTH EYES AND MACULAR EDEMA, UNSPECIFIED WHETHER LONG TERM INSULIN USE (H): ICD-10-CM

## 2021-04-15 PROCEDURE — 67028 INJECTION EYE DRUG: CPT | Mod: RT | Performed by: OPHTHALMOLOGY

## 2021-04-15 PROCEDURE — 250N000011 HC RX IP 250 OP 636

## 2021-04-15 PROCEDURE — 92134 CPTRZ OPH DX IMG PST SGM RTA: CPT | Performed by: OPHTHALMOLOGY

## 2021-04-15 PROCEDURE — G0463 HOSPITAL OUTPT CLINIC VISIT: HCPCS

## 2021-04-15 RX ADMIN — Medication 1.25 MG: at 10:27

## 2021-04-15 ASSESSMENT — TONOMETRY
OD_IOP_MMHG: 17
OS_IOP_MMHG: 15
IOP_METHOD: TONOPEN

## 2021-04-15 ASSESSMENT — CONF VISUAL FIELD
OS_SUPERIOR_NASAL_RESTRICTION: 3
OD_SUPERIOR_NASAL_RESTRICTION: 3
OS_INFERIOR_TEMPORAL_RESTRICTION: 1
OS_SUPERIOR_TEMPORAL_RESTRICTION: 1
OD_SUPERIOR_TEMPORAL_RESTRICTION: 3
METHOD: COUNTING FINGERS
OS_INFERIOR_NASAL_RESTRICTION: 3

## 2021-04-15 ASSESSMENT — VISUAL ACUITY
OD_SC: 20/80
OS_SC: CF @ 3'
METHOD: SNELLEN - LINEAR

## 2021-04-15 ASSESSMENT — EXTERNAL EXAM - LEFT EYE: OS_EXAM: NORMAL

## 2021-04-15 ASSESSMENT — SLIT LAMP EXAM - LIDS
COMMENTS: PTOSIS OD>OS
COMMENTS: PTOSIS OD>OS

## 2021-04-15 ASSESSMENT — EXTERNAL EXAM - RIGHT EYE: OD_EXAM: NORMAL

## 2021-04-15 NOTE — NURSING NOTE
Chief Complaints and History of Present Illnesses   Patient presents with     Diabetic Eye Exam Follow Up     Chief Complaint(s) and History of Present Illness(es)     Diabetic Eye Exam Follow Up     Vision: is worsening    Associated symptoms: floaters    Diabetes Type: Type 2    Response to treatment: no improvement    Pain scale: 0/10              Comments     Lucian is here to continue care for 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, unspecified whether long term insulin use (H). He feels he can see a little better LE, but is complaining of new black spots RE that started four day ago while walking.     Lab Results       Component                Value               Date                       A1C                      6.7                 01/14/2021                 A1C                      5.9                 12/07/2020                 BS today was 88    Ivan Tejeda COT 9:21 AM April 15, 2021

## 2021-04-15 NOTE — PROGRESS NOTES
CC: follow up status post PPV/MP/retinectomy 12/21/20. 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 Denies eye pain.    Right eye: new vitreous hemorrhage in right eye 4 days ago  Left eye: Patient states vision improved in left eye. Now count fingers    Stable     Optical Coherence Tomography: 4-15-21  Right eye: good foveal contour  Left eye: mild Epiretinal membrane; nasal edema; inferior area of subretinal fluid. Irregular retina      previously had an extensive discussion about guarded prognosis of left eye. Patient was found to have funnel RD that despite best efforts, was not able to be . Nonetheless, the temporal folded retina appears flat on exam today and patient is happy with the mild improved vision.    Now with new recurrent vitreous hemorrhage right eye     Plan:   - Plan for avastin inj right eye today   - Continue latanoprost at bedtime both eyes   - Predforte every other day left eye   - Retina detachment and endophthalmitis precautions were discussed with the patient and was asked to return if any of the those occur.  -  Follow up in one month for avastin vs laser right eye     Lauren Jang MD  Ophthalmology Resident, PGY-3     ~~~~~~~~~~~~~~~~~~~~~~~~~~~~~~~~~~   Complete documentation of historical and exam elements from today's encounter can be found in the full encounter summary report (not reduplicated in this progress note).  I personally obtained the chief complaint(s) and history of present illness.  I confirmed and edited as necessary the review of systems, past medical/surgical history, family history, social history, and examination findings as documented by others; and I examined the patient myself.  I personally reviewed the relevant tests, images, and reports as documented above.  I formulated and edited as necessary the assessment and plan and discussed the findings and management plan with the patient and family and No resident or fellow assisted with the  procedures performed.  I performed the procedures myself.    Triny Bunch MD   of Ophthalmology.  Retina Service   Department of Ophthalmology and Visual Neurosciences   [facility]  Phone: [phone]   Fax: [phone]

## 2021-04-27 ENCOUNTER — TELEPHONE (OUTPATIENT)
Dept: CARE COORDINATION | Facility: CLINIC | Age: 66
End: 2021-04-27

## 2021-04-27 NOTE — TELEPHONE ENCOUNTER
Transplant Social Work Services Phone Call      Data:  contacted writer to discuss financial concerns. Writer returned pt's call using an Need Fixed . Pt reports he has received multiple medical bills and does not know what to do. Pt reports he has not spoken to billing dept. Assessed if there were any financial concerns related to medications and pt reports he has been able to afford his medications.     Intervention: Supportive Phone Call, Community Resources   Assessment: Pt having difficulty paying medical bills.   Education provided by [facility]: Ongoing Social Work support in outpatient setting  Plan:    ROSIBEL to look into resources and email them to pt at [email or web address].

## 2021-05-06 ENCOUNTER — PRE VISIT (OUTPATIENT)
Dept: TRANSPLANT | Facility: CLINIC | Age: 66
End: 2021-05-06

## 2021-05-06 DIAGNOSIS — Z94.1 HEART TRANSPLANT, ORTHOTOPIC, STATUS (H): Primary | ICD-10-CM

## 2021-05-08 ENCOUNTER — HEALTH MAINTENANCE LETTER (OUTPATIENT)
Age: 66
End: 2021-05-08

## 2021-05-10 DIAGNOSIS — Z94.1 HEART TRANSPLANT, ORTHOTOPIC, STATUS (H): Primary | ICD-10-CM

## 2021-05-11 ENCOUNTER — ANCILLARY PROCEDURE (OUTPATIENT)
Dept: CARDIOLOGY | Facility: CLINIC | Age: 66
End: 2021-05-11
Attending: INTERNAL MEDICINE
Payer: COMMERCIAL

## 2021-05-11 VITALS
SYSTOLIC BLOOD PRESSURE: 134 MMHG | DIASTOLIC BLOOD PRESSURE: 76 MMHG | BODY MASS INDEX: 30.31 KG/M2 | OXYGEN SATURATION: 99 % | HEART RATE: 90 BPM | HEIGHT: 65 IN | WEIGHT: 181.9 LBS

## 2021-05-11 DIAGNOSIS — Z94.1 HEART REPLACED BY TRANSPLANT (H): Primary | ICD-10-CM

## 2021-05-11 DIAGNOSIS — Z94.1 HEART TRANSPLANT, ORTHOTOPIC, STATUS (H): Primary | ICD-10-CM

## 2021-05-11 DIAGNOSIS — Z94.1 HEART TRANSPLANT, ORTHOTOPIC, STATUS (H): ICD-10-CM

## 2021-05-11 LAB
ALBUMIN SERPL-MCNC: 3.9 G/DL (ref 3.4–5)
ALP SERPL-CCNC: 104 U/L (ref 40–150)
ALT SERPL W P-5'-P-CCNC: 18 U/L (ref 0–70)
ANION GAP SERPL CALCULATED.3IONS-SCNC: 6 MMOL/L (ref 3–14)
AST SERPL W P-5'-P-CCNC: <3 U/L (ref 0–45)
BILIRUB SERPL-MCNC: 0.4 MG/DL (ref 0.2–1.3)
BUN SERPL-MCNC: 34 MG/DL (ref 7–30)
CALCIUM SERPL-MCNC: 9 MG/DL (ref 8.5–10.1)
CHLORIDE SERPL-SCNC: 107 MMOL/L (ref 94–109)
CMV DNA SPEC NAA+PROBE-ACNC: NORMAL [IU]/ML
CMV DNA SPEC NAA+PROBE-LOG#: NORMAL {LOG_IU}/ML
CO2 SERPL-SCNC: 27 MMOL/L (ref 20–32)
CREAT SERPL-MCNC: 1.84 MG/DL (ref 0.66–1.25)
ERYTHROCYTE [DISTWIDTH] IN BLOOD BY AUTOMATED COUNT: 13.2 % (ref 10–15)
GFR SERPL CREATININE-BSD FRML MDRD: 37 ML/MIN/{1.73_M2}
GLUCOSE SERPL-MCNC: 98 MG/DL (ref 70–99)
HCT VFR BLD AUTO: 41.5 % (ref 40–53)
HGB BLD-MCNC: 13.4 G/DL (ref 13.3–17.7)
MAGNESIUM SERPL-MCNC: 1.8 MG/DL (ref 1.6–2.3)
MCH RBC QN AUTO: 30 PG (ref 26.5–33)
MCHC RBC AUTO-ENTMCNC: 32.3 G/DL (ref 31.5–36.5)
MCV RBC AUTO: 93 FL (ref 78–100)
PHOSPHATE SERPL-MCNC: 3.2 MG/DL (ref 2.5–4.5)
PLATELET # BLD AUTO: 189 10E9/L (ref 150–450)
POTASSIUM SERPL-SCNC: 4 MMOL/L (ref 3.4–5.3)
PROT SERPL-MCNC: 6.9 G/DL (ref 6.8–8.8)
RBC # BLD AUTO: 4.47 10E12/L (ref 4.4–5.9)
SODIUM SERPL-SCNC: 141 MMOL/L (ref 133–144)
SPECIMEN SOURCE: NORMAL
TACROLIMUS BLD-MCNC: 5.6 UG/L (ref 5–15)
TME LAST DOSE: NORMAL H
WBC # BLD AUTO: 5.1 10E9/L (ref 4–11)

## 2021-05-11 PROCEDURE — 83735 ASSAY OF MAGNESIUM: CPT | Performed by: PATHOLOGY

## 2021-05-11 PROCEDURE — 36415 COLL VENOUS BLD VENIPUNCTURE: CPT | Performed by: PATHOLOGY

## 2021-05-11 PROCEDURE — 99215 OFFICE O/P EST HI 40 MIN: CPT | Mod: 25 | Performed by: NURSE PRACTITIONER

## 2021-05-11 PROCEDURE — 87799 DETECT AGENT NOS DNA QUANT: CPT | Performed by: PATHOLOGY

## 2021-05-11 PROCEDURE — G0463 HOSPITAL OUTPT CLINIC VISIT: HCPCS

## 2021-05-11 PROCEDURE — 93306 TTE W/DOPPLER COMPLETE: CPT | Performed by: INTERNAL MEDICINE

## 2021-05-11 PROCEDURE — 84100 ASSAY OF PHOSPHORUS: CPT | Performed by: PATHOLOGY

## 2021-05-11 PROCEDURE — 80053 COMPREHEN METABOLIC PANEL: CPT | Performed by: PATHOLOGY

## 2021-05-11 PROCEDURE — 80197 ASSAY OF TACROLIMUS: CPT | Performed by: PATHOLOGY

## 2021-05-11 PROCEDURE — 85027 COMPLETE CBC AUTOMATED: CPT | Performed by: PATHOLOGY

## 2021-05-11 ASSESSMENT — MIFFLIN-ST. JEOR: SCORE: 1519.03

## 2021-05-11 ASSESSMENT — PAIN SCALES - GENERAL: PAINLEVEL: NO PAIN (0)

## 2021-05-11 NOTE — PROGRESS NOTES
"[facility]  May 11, 2021    HPI:   Mr. Lucian Henderson is a 65yr old male with a history of CAD (s/p 3v CABG 2008), ICM s/p OHT 7/19/20, DMII, CKD, and HTN who presents to clinic for routine follow up.  A professional  was used for this visit.     His post-operative course was c/b primary graft dysfunction (LVEF 20-25% and severe RV dysfunction, thought to be secondary to prolonged ischemic time, resolved by f/up TTE 7/27), VT, pericardial effusion (incidentally found per TTE 7/27, decreased size noted on TTE 7/29), donor streptococcus salivarius bacteremia (s/p 7d course of ceftriaxone), and RUE DVT c/b HIT (s/p PLEX).  He ultimately discharged 8/3/20.     He has been readmitted as follows:  - 8/24/20-8/28/20:  hyperglycemia (BGs 500s), thought to be secondary to incorrect home insulin administration  - 9/22/20-10/5/20:  fevers and drainage from former ICD site, with Chest/abd CT concerning for cellulitis in the epigastric region.  He underwent surgical debridement of his left infraclavicular wound and debridement of midline chest tube wound on 9/23, which was c/b bleeding and required reoperation 9/28 for I&D of hematoma.  Cultures were positive for Pseudomonas aeruginosa, so he was treated with 4 weeks of cipro per Transplant ID and CVTS.  He developed leukopenia and moderate neutropenia, so his cellcept was held then restarted at low dose, and valcyte was stopped.  He received neupogen x 1.  He ultimately discharged with a wound vac.  - 12/9/20:  ED visit for right vision loss, and found to have a vitreous hemorrhage in his right eye and total retinal detachment of his left eye per ophthalmology.  He was then seen in the eye clinic 12/10, where he was advised an Avastin injection in the right eye (for proliferative diabetic retinopathy and vitreous hemorrhage).  He then underwent \"27 gauge pars plana vitectomy, membrane peel, perfluoroctane liquid (PFO), retinectomy, " "endolaser\" 12/21 for the tractional retinal detachment of his left eye.  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  He has no DSAs.  Baseline coronary angiogram has been deferred.  Last TTE 3/2021 showed normal graft function, with LVEF 55-60% and normal RV size/function.  Last RHC 1/2021 showed normal biventricular filling pressures, with RA 2, mPA 16, PCW 10, and CI 2.87.    Since his last visit, he states that he feels better overall.  His vision has continued to improve.  He has been walking much better, and notes improvement in his strength and endurance.  He is now able to walk 10 miles with no exertional concerns.  His breathing has been good, and he denies SOB, PND, and orthopnea.  His appetite has been good, and he denies nausea, vomiting, diarrhea, and constipation.  His weight has been stable, ranging 180-185#, and he denies fluid retention.  His lower extremity edema has improved.  He feels dizzy when rising too quickly, and denies falls.  He otherwise denies chest pain, palpitations, persistent dizziness, headaches, acute vision changes, fevers, chills, cough, sore throat, and signs of bleeding.      Serostatus:  CMV D+/R+  EBV D+/R+  Toxo D+/R-    Patient Active Problem List    Diagnosis Date Noted     Cardiogenic shock (H) 02/04/2021     Priority: Medium     Immunodeficiency, unspecified (H) 02/02/2021     Priority: Medium     CKD (chronic kidney disease) stage 4, GFR 15-29 ml/min (H) 02/02/2021     Priority: Medium     HIT (heparin-induced thrombocytopenia) (H) 02/02/2021     Priority: Medium     Traction detachment of left retina 12/14/2020     Priority: Medium     Added automatically from request for surgery [id number]       Pseudomonas infection 10/05/2020     Priority: Medium     Need for prophylactic antibiotic 10/05/2020     Priority: Medium     Trina-Barr virus viremia 10/05/2020 "     Priority: Medium     Sepsis (H) 09/22/2020     Priority: Medium     Hyperglycemia 08/24/2020     Priority: Medium     Heart transplant, orthotopic, status (H) 07/20/2020     Priority: Medium     CHF (congestive heart failure) (H) 07/03/2020     Priority: Medium     Acute on chronic systolic congestive heart failure (H) 07/03/2020     Priority: Medium     Added automatically from request for surgery [id number]       Heart failure (H) 07/03/2020     Priority: Medium     Added automatically from request for surgery [id number]       Dental caries 07/03/2020     Priority: Medium     Added automatically from request for surgery [id number]       Heart replaced by transplant (H) 07/03/2020     Priority: Medium     Added automatically from request for surgery [id number]       Status post coronary angiogram 07/02/2020     Priority: Medium     Coronary artery disease involving native coronary artery of native heart without angina pectoris 06/18/2020     Priority: Medium     Added automatically from request for surgery [id number]       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, unspecified whether long term insulin use (H) 11/27/2019     Priority: Medium     Added automatically from request for surgery [id number]       Nuclear cataract, nonsenile 11/27/2019     Priority: Medium     Added automatically from request for surgery [id number]       Heart transplant candidate 07/18/2019     Priority: Medium     Systolic heart failure (H) 05/14/2019     Priority: Medium     Added automatically from request for surgery [id number]       CHANTEL (obstructive sleep apnea)- severe (AHI 30) 10/06/2016     Priority: Medium     Study Date: 10/03/2016- (196.0 lbs) apnea/hypopnea index 30.8. REM AHI 40,  supine AHI n/a. RDI  33.1 . Lowest oxygen saturation 82.8%.  Time spent less than or equal to 88% 4.9 minutes.  Time spent less than or equal to 89% 7.3 minutes. CPAP final  pressure 7 cmH2O with AHI of 0.8.  Time in REM  supine on final pressure 0 minutes. No consolidated sleep seen in supine position. During diagnostic portion of study, PLM index 18.2. During treatment portion of study,  PLM index 19.3.             Moderate nonproliferative diabetic retinopathy, without macular edema, associated with type 2 diabetes mellitus 08/16/2016     Priority: Medium     Cortical cataract of both eyes 08/15/2016     Priority: Medium     Secondary renal hyperparathyroidism (H) 07/26/2016     Priority: Medium     Proteinuria 03/18/2016     Priority: Medium     Vitamin D deficiency 03/18/2016     Priority: Medium     OA (osteoarthritis) of knee 03/18/2016     Priority: Medium     Chondromalacia of patella, unspecified laterality 03/18/2016     Priority: Medium     Pain in joint of left shoulder 03/18/2016     Priority: Medium     Tinnitus 03/18/2016     Priority: Medium     left        Ptosis of right eyelid 03/18/2016     Priority: Medium     Chronic systolic heart failure (H)      Priority: Medium     Echo 7/2015 LVEF 18%       Automatic implantable cardioverter-defibrillator - [employer] single lead ICD, Not Dependent 08/18/2015     Priority: Medium     Health Care Home 01/19/2015     Priority: Medium     *See Letters for [facility] Care Plan: Emergency Care Plan         CKD (chronic kidney disease) stage 3, GFR 30-59 ml/min 01/28/2013     Priority: Medium     CHF (NYHA class II, ACC/AHA stage C) (H) 08/15/2012     Priority: Medium     Hypertension goal BP (blood pressure) < 140/90 01/21/2011     Priority: Medium     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) 10/31/2010     Priority: Medium     Goal <8%       Hyperlipidemia LDL goal <100 10/31/2010     Priority: Medium     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 (H) 03/15/2010     Priority: Medium     MI and open heart with 1 stent placed in 2008; another minor MI in 2009.  MI on 2/19/15. Coronary angiogram from  [facility] on 2/19/15 showed severe 3 vessel native CAD (all three vessels [LAD, LCx and RCA] reported to be 100% ocludded at their ostia). All his grafts were considered to be occluded except for LIMA-to-LAD, which was patent and supplied left-to-left, as well as, left-to-right collaterals. There were no targets suitable for revascularization and patient was put on medical therapy.        Morbid obesity (H) 09/26/2016     Priority: Low       PAST MEDICAL HISTORY:  Past Medical History:   Diagnosis Date     CAD (coronary artery disease)      CHF (congestive heart failure) (H)      CKD (chronic kidney disease), stage III      Cortical cataract of both eyes      Diabetes (H)      Hyperlipidemia      Hypertension      Infection due to Streptococcus mitis group 09/23/2020     Ischemic cardiomyopathy      Obesity      CHANTEL (obstructive sleep apnea)     occas cpap     Osteoarthritis        CURRENT MEDICATIONS:  Prescription Medications as of 5/11/2021       Rx Number Disp Refills Start End Last Dispensed Date Next Fill Date Owning Pharmacy    acetaminophen (TYLENOL) 325 MG tablet  60 tablet 3 9/2/2020    [facility] - [address]    Sig: Take 3 tablets (975 mg) by mouth every 8 hours as needed for mild pain    Class: E-Prescribe    Route: Oral    Alcohol Swabs PADS  100 each 0 8/3/2020    [address]    Sig: Use to swab the area of the injection or sergio as directed   Per insurance coverage    Class: Local Print    aspirin (ASA) 81 MG chewable tablet  120 tablet 0 10/6/2020    [address]    Sig: Take 1 tablet (81 mg) by mouth daily    Class: E-Prescribe    Route: Oral    atropine 1 % ophthalmic solution            Sig: Place 1 drop Into the left eye daily    Class: Historical    Route: Left Eye    blood glucose (NO BRAND SPECIFIED)  test strip  400 strip 3 2021    [facility] #[id number] - [place], [place] - [address] AT [address]    Sig: Use to test blood sugar 4 times daily or as directed.    Class: E-Prescribe    blood glucose monitoring (ACCU-CHEK FELIPE SMARTVIEW) meter device kit  1 kit 0 2017    [facility] - [place] - [address] [place] [address] Ave [address]    Sig: Use to test blood sugar 3-4 times daily, as directed.    Class: E-Prescribe    blood glucose monitoring (SOFTCLIX) lancets  400 each 11 2021    [facility][id number] - [facility] [address]    Si each 4 times daily Use to test blood sugars 2 times daily.    Class: E-Prescribe    Route: Does not apply    calcium carbonate 600 mg-vitamin D 400 units (CALTRATE) 600-400 MG-UNIT per tablet  180 tablet 3 2020    [facility] - [address]    Sig: Take 1 tablet by mouth 2 times daily (with meals)    Class: E-Prescribe    Route: Oral    Continuous Blood Gluc Sensor (FREESTYLE JEREMY 14 DAY SENSOR) [facility]  2 each 11 2020    [facility] [place]    Sig: Change every 14 days.    Class: Local Print    Continuous Blood Gluc Sensor (FREESTYLE JEREMY 14 DAY SENSOR) [facility]  6 each 4 2020    [id number] - [facility] [address]    Sig: Change every 14 days.    Class: E-Prescribe    Notes to Pharmacy: Use to check BG 5 times daily.    furosemide (LASIX) 20 MG tablet  90 tablet 3 10/12/2020    [facility] [address]     Sig: Take 1 tablet (20 mg) by mouth daily    Class: E-Prescribe    Route: Oral    HUMALOG KWIKPEN 100 UNIT/ML soln  90 mL 1 11/10/2020    [facility] #[id number] - [place], [place] - [address] AT [address]    Sig: Inject 0-31 Units Subcutaneous 3 times daily (with meals) + Custom MEAL and SNACK corrective dosing   Approx 90 units  daily max    Class: E-Prescribe    Cosign for Ordering: Accepted by Marisabel Prieto PA-C on 2020 10:04 PM    hydrALAZINE (APRESOLINE) 100 MG tablet  270 tablet 3 2020    [facility]    Sig: Take 1 tablet (100 mg) by mouth every 8 hours    Class: E-Prescribe    Route: Oral    insulin aspart (NOVOLOG PEN) 100 UNIT/ML pen  6 mL 0 10/5/2020    [address]    Sig: Inject 0-31 Units Subcutaneous 3 times daily (with meals) Custom MEAL and SNACK corrective dosing     BG less than 80, do not give Novolog.  BG , give  15 units.  -169, give  17 units.  -199 give 19 units.  -229 give 21 units.  -259 give 23 units.  -289 give 25 units.  -319 give 27 units.  -349 give 29 units.  BG >/= 350 give 31 units.  To be given with meal based on pre-meal blood glucose    Class: Local Print    Route: Subcutaneous    insulin isophane human (HUMULIN N PEN) 100 UNIT/ML injection  30 mL 11 3/2/2021    [id number] - [place] [address] AT [facility] [address]    Sig: Inject 35 Units Subcutaneous every morning    Class: E-Prescribe    Route: Subcutaneous    insulin isophane human (HUMULIN N PEN) 100 UNIT/ML injection  3 mL 1 10/5/2020    [address]    Sig: Inject 8 Units Subcutaneous At Bedtime    Class: E-Prescribe    Route: Subcutaneous    insulin pen needle (32G X 4 MM) 32G X 4 MM miscellaneous  100 each 0 8/3/2020    [address]    Sig: Use as directed by provider  Per insurance coverage    Class: Local Print    insulin pen needle (B-D U/F) 31G X 5 MM miscellaneous  100 each 3 2020    [facility] [place] [address]    Si Units by Device route 2 times daily Use 2 pen  needles daily or as directed.    Class: E-Prescribe    Route: Device    insulin pen needle (NOVOFINE) 30G X 8 MM miscellaneous  200 each 3 8/18/2020    [facility] #[id number] - [facility] [address] AT [facility]    Sig: Inject 1 Box Subcutaneous 6 times daily Use 6 pen needles daily or as directed.    Class: E-Prescribe    Notes to Pharmacy: Has both Novolog Flex pen and several Humalog Kwik pen, but doesn't have correct needle.    Route: Subcutaneous    latanoprost (XALATAN) 0.005 % ophthalmic solution  15 mL 4 1/14/2021    [facility] #[id number] - [place], [place] - [address] AT [facility]    Sig: INSTILL 1 DROP IN BOTH EYES AT BEDTIME    Class: E-Prescribe    Notes to Pharmacy: **Patient requests 90 days supply**    magnesium oxide 400 MG CAPS  180 capsule 3 9/22/2020    [facility] - [place], [place] - [address]    Sig: Take 400 mg by mouth 2 times daily    Class: E-Prescribe    Route: Oral    mycophenolate (GENERIC EQUIVALENT) 250 MG capsule  240 capsule 11 11/27/2020    [facility] - [place], [place] - [facility] [address]    Sig: Take 4 capsules (1,000 mg) by mouth 2 times daily    Class: E-Prescribe    Notes to Pharmacy: TXP DT 7/19/2020 (Heart) TXP Dischg DT  DX Heart replaced by transplant Z94.1 TX Center [facility] ([place], [place])    Route: Oral    neomycin-polymixin-dexamethasone (MAXITROL) 0.1 % ophthalmic suspension  5 mL 0 12/21/2020    [facility] [place], [place] - [address]    Sig: Apply 1 drop to eye 4 times daily Instill into operative eye(s) per physician instructions.    Class: E-Prescribe    Route: Ophthalmic    neomycin-polymixin-dexamethasone (MAXITROL) ophthalmic ointment            Sig: Place 1 Application Into the left eye At Bedtime    Class: Historical    Route: Left Eye    order for DME  1 Units 0 1/11/2017        Sig: Auto CPAP  8-15 cmH20    Class: Sleep Center    pantoprazole (PROTONIX) 40 MG EC tablet    11/6/2020    [facility] [address]     Class: Historical    prednisoLONE acetate (PRED FORTE) 1 % ophthalmic suspension  1 Bottle 1 1/14/2021    [facility] #[id number] - [place], [place] - [address] AT [facility] [address] [facility]    Sig: Place 1-2 drops Into the left eye 2 times daily    Class: E-Prescribe    Notes to Pharmacy: use 1 drop left eye 2 x daily x 1 week, then 1 drop 1 x daily x 1 week, then stop    Route: Left Eye    rosuvastatin (CRESTOR) 10 MG tablet  90 tablet 3 8/18/2020    [facility] [place] [address] [address]     Sig: Take 1 tablet (10 mg) by mouth daily    Class: E-Prescribe    Route: Oral    senna-docusate (SENOKOT-S/PERICOLACE) 8.6-50 MG tablet  60 tablet 0 10/5/2020    [facility] - [place], [place] - [address] [place] [address] SE    Sig: Take 1 tablet by mouth 2 times daily (hold for loose stools)    Class: E-Prescribe    Route: Oral    sulfamethoxazole-trimethoprim (BACTRIM) 400-80 MG tablet  90 tablet 3 8/18/2020    [facility] [address]     Sig: Take 1 tablet by mouth daily    Class: E-Prescribe    Route: Oral    tacrolimus (GENERIC EQUIVALENT) 1 MG capsule  300 capsule 11 1/7/2021    [facility] [address]     Sig: Take 5 capsules (5 mg) by mouth every morning AND 5 capsules (5 mg) every evening.    Class: E-Prescribe    Notes to Pharmacy: TXP DT 7/19/2020 (Heart), 7/19/2020 (Heart) TXP Dischg DT 8/3/2020 DX Heart transplant Z94.1 TX Center [facility] ([place], [place])    Route: Oral    tamsulosin (FLOMAX) 0.4 MG capsule  60 capsule 0 10/6/2020    [facility] [place], [place] - [address] [place] [address]    Sig: Take 1 capsule (0.4 mg) by mouth daily    Class: E-Prescribe    Route: Oral     "  Clinic-Administered Medications as of 5/11/2021       Dose Frequency Start End    bevacizumab (AVASTIN) intravitreal inj 1.25 mg 1.25 mg EVERY 28 DAYS 12/10/2020 11/11/2021    Admin Instructions: Prn every 3-52 weeks<BR>Eunice Webb, MAYE <BR>[phone]<BR>re    Route: Intravitreal          ROS:   Constitutional: No fever, chills, or sweats. Stable weight.   ENT: No visual disturbance, ear ache, epistaxis, sore throat.   Allergies/Immunologic: Negative.   Respiratory: No cough, hemoptysis.   Cardiovascular: As per HPI.   GI: No nausea, vomiting, hematemesis, melena, or hematochezia.   : No urinary frequency, dysuria, or hematuria.   Integument: Negative.   Psychiatric: Negative.   Neuro: Negative.   Endocrinology: Negative.   Musculoskeletal: Negative    Exam:  /76 (BP Location: Right arm, Patient Position: Chair, Cuff Size: Adult Regular)   Pulse 90   Ht 1.638 m (5' 4.5\")   Wt 82.5 kg (181 lb 14.4 oz)   SpO2 99%   BMI 30.74 kg/m    In general, the patient is a pleasant male in no apparent distress.    HEENT: NC/AT. PERRLA. EOMI.  Sclerae white, not injected.    Neck:  No adenopathy, No thyromegaly.    COR: No jugular venous distention when sitting upright.  RRR.  Normal S1 S2 splits physiologically.  No murmur, rub click, or gallop.    Lungs:  CTA. No rhonchi.    Abdomen: soft, nontender, nondistended.  No organomegaly.  Extremities:  No clubbing or cyanosis, mild bilateral LE edema with pitting above ankles.    Neuro: Alert & Oriented x 3, grossly non focal.  Integument: no open lesions, rashes, or jaundice.    Labs:  CBC RESULTS:   Lab Results   Component Value Date    WBC 4.5 03/02/2021    RBC 4.17 (L) 03/02/2021    HGB 12.3 (L) 03/02/2021    HCT 39.3 (L) 03/02/2021    MCV 94 03/02/2021    MCH 29.5 03/02/2021    MCHC 31.3 (L) 03/02/2021    RDW 14.0 03/02/2021     03/02/2021       BMP RESULTS:  Lab Results   Component Value Date     03/02/2021    POTASSIUM 4.2 03/02/2021    CHLORIDE 108 " 03/02/2021    CO2 27 03/02/2021    ANIONGAP 7 03/02/2021     (H) 03/02/2021    BUN 36 (H) 03/02/2021    CR 1.83 (H) 03/02/2021    GFRESTIMATED 37 (L) 03/02/2021    GFRESTBLACK 43 (L) 03/02/2021    BRYANNA 9.2 03/02/2021      LIPID RESULTS:  Lab Results   Component Value Date    CHOL 133 01/05/2021    HDL 40 01/05/2021    LDL 58 01/05/2021    TRIG 179 (H) 01/05/2021    CHOLHDLRATIO 2.6 06/27/2015    NHDL 94 01/05/2021       IMMUNOSUPPRESSANT LEVELS  Lab Results   Component Value Date    TACROL 6.5 03/02/2021    DOSTAC 08pm 03/02/2021       No components found for: CK  Lab Results   Component Value Date    MAG 1.7 03/02/2021     Lab Results   Component Value Date    A1C 6.7 (H) 01/14/2021     Lab Results   Component Value Date    PHOS 3.4 03/02/2021     Lab Results   Component Value Date    NTBNPI 8,792 (H) 09/22/2020     Lab Results   Component Value Date    SAITESTMET  FCS 01/05/2021    SAICELL Class I 01/05/2021    [id number] None 01/05/2021    [id number] None 01/05/2021    SAIREPCOM  01/05/2021      Test performed by modified procedure. Serum heat inactivated and tested   by a modified ([place]) protocol including fetal calf serum addition.   High-risk, mfi >3,000. Mod-risk, mfi 500-3,000.       Lab Results   Component Value Date    SAIITESTME SA FCS 01/05/2021    SAIICELL Class II 01/05/2021    [id number] None 01/05/2021    [id number] None 01/05/2021    SAIIREPCOM  01/05/2021      Test performed by modified procedure. Serum heat inactivated and tested   by a modified ([place]) protocol including fetal calf serum addition.   High-risk, mfi >3,000. Mod-risk, mfi 500-3,000.       Lab Results   Component Value Date    CSPEC EDTA PLASMA 01/05/2021       Assessment and Plan:  Mr. Lucian Henderson is a 65yr old male with a history of CAD (s/p 3v CABG 2008), ICM s/p OHT 7/19/20, DMII, CKD, and HTN who presents to clinic for routine follow up.  A professional  was used for this visit.    Labs today show  stable electrolytes, renal function, liver function, and blood counts.  CMV, EBV, AlloMap, and tacro levels are in process.      TTE results from today are in process.    Mr. Tee Henderson appears well today.  His BPs remain stable.  His weight remains stable, and he appears euvolemic.  Advised that he continue his current meds, with no changes, and will plan for him to follow-up per protocol or sooner should new concerns arise.    Of note, he stated that a family member is getting  in [place], and that he and his wife are considering going.  He states that he will be staying with family.  Discussed that should he and his wife decide to go, that they should mask, wash hands, and social distance as able when in large groups.  Asked that he call with any questions about this.       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  He ultimately discharged 8/3/20.     His biopsies have remained negative for rejection, and recent AlloMap scores have remained < 36.  He has no DSAs.  Baseline coronary angiogram has been deferred.  Last TTE 3/2021 showed normal graft function, with LVEF 55-60% and normal RV size/function.  Last RHC 1/2021 showed normal biventricular filling pressures, with RA 2, mPA 16, PCW 10, and CI 2.87.      Serostatus:  - CMV D+/R+  - EBV D+/R+  - Toxo D+/R-     Immunosuppression:  - MMF 1000mg twice daily  - tacro, goal level 6-8.  Level today in process.     PPx:  - CAV:  Aspirin 81mg daily and rosuvastatin 10mg daily  - GI:  Pantoprazole 40mg daily  - Osteoporosis:  Calcium/vitamin D supplements  - Toxo:  Single strength bactrim, lifelong ppx  "--> will discuss decreasing dose with Dr. Sheridan, due to renal function     Graft function:  - BPs:  Stable, continue hydral 100mg TID  - fluid status:  Euvolemic, taking lasix 20mg daily due to ongoing LE edema.     Health maintenance:  - received flu shot  - needs baseline coronary angiogram --> will do it with his annual per Dr. Sheridan.  - completed COVID vaccinations       DMII  Follows with endo and PCP.     HIT  HIT antibody + 7/2020.  CORRINE +.  No heparin products.     RIJ and axillary vein DVT, nonocclusive  Right cephalic vein occlusive thrombus  S/p course of anticoagulation.     Total retinal detachment of the left eye, s/p retinectomy 12/21/20  Proliferative diabetic retinopathy  Right vitreous hemorrhage  He presented to the ED 12/9 with right vision loss.  Ophthalmology was consulted, and found that he had a vitreous hemorrhage in his right eye and total retinal detachment of his left eye.  He was then seen in the eye clinic 12/10, where he was advised an Avastin injection in the right eye (for proliferative diabetic retinopathy and vitreous hemorrhage).  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          The above was reviewed with Mr. Tee Escobarza, who verbalized understanding and will call with further questions/concerns.    45 minutes spent with patient, along with preparing for visit, reviewing follow up, and completing documentation.      Arlin Guajardo, SONA, FNP-BC, CHFN  Advanced Heart Failure Nurse Practitioner  [facility]  Patient Care Team:  No Ref-Primary, Physician as PCP - Diane Groves, ELIJAH CNP as Nurse Practitioner (Cardiology)  Arvin Sheridan MD as MD " (Cardiology)  Yue Dowd MD as MD (INTERNAL MEDICINE - ENDOCRINOLOGY, DIABETES & METABOLISM)  Christelle Pettit, RN as Transplant Coordinator (Cardiology)  Negrito Rai [facility] as Pharmacist (Pharmacist)  [facility] [place], [facility] ([facility] ([facility] ([place]  Triny Bunch MD as Assigned Surgical Provider  Arvin Sheridan MD as Assigned Heart and Vascular Provider  Anna Archuleta PA-C as Assigned PCP  Marisabel Prieto PA-C as Assigned Endocrinology Provider

## 2021-05-11 NOTE — PATIENT INSTRUCTIONS
Patient Instructions  1. Christelle will call with any remaining results.  2. If you decide to go to [place]- make sure to mask, wash hands, be very careful- avoid large groups- Call Christelle to discuss before you go.  3. Return in 2 months for 1st annual.    Next transplant clinic appointment:  In July for 1st annual!  Next lab draw:   Coordinator will call with all pending results.     Please call transplant coordinator with any questions:    Christelle Pettit RN BSN   Post Heart Transplant Nurse Coordinator  [facility]  Questions: [phone]

## 2021-05-11 NOTE — NURSING NOTE
Chief Complaint   Patient presents with     Follow Up     10 mo heart transplant     Vitals were taken and medication reconciled.    SAMUEL Reza  9:55 AM

## 2021-05-11 NOTE — NURSING NOTE
Transplant Coordinator Note    Reason for visit: 10 month visit  Coordinator: Present   Caregiver:  wife present    Health concerns addressed today:  1. Walking 10 miles at a time!  2. Vision is slowly improving since eye surgery.  3. Wanting to travel to [place] this Summer.      Immunosuppressants:  MMF 1000 BID  Tacro 5/5          Labs:   Creat 1.8- stable        Additional Notes:   Echo normal 60-65%  Has had both Covid shots.      Labs and echo reviewed with patient  Medication record reviewed and reconciled  Questions and concerns addressed  Pt verbalized an understanding of plan of care.     Patient Instructions  1. Christelle will call with any remaining results.  2. If you decide to go to [place]- make sure to mask, wash hands, be very careful- avoid large groups- Call Christelle to discuss before you go.  3. Return in 2 months for 1st annual.    Next transplant clinic appointment:  In July for 1st annual!  Next lab draw:   Coordinator will call with all pending results.     Please call transplant coordinator with any questions:    Christelle Pettit RN BSN   Post Heart Transplant Nurse Coordinator  [facility]  Questions: [phone]

## 2021-05-11 NOTE — LETTER
5/11/2021      RE: Lucian Mar Sr.  [address] [facility] [address]       Dear Colleague,    Thank you for the opportunity to participate in the care of your patient, Lucian Mar Sr., at the [facility] [place] at [facility]. Please see a copy of my visit note below.    ADULT HEART TRANSPLANT CLINIC  May 11, 2021    HPI:   Mr. Lucian Henderson is a 65yr old male with a history of CAD (s/p 3v CABG 2008), ICM s/p OHT 7/19/20, DMII, CKD, and HTN who presents to clinic for routine follow up.  A professional  was used for this visit.     His post-operative course was c/b primary graft dysfunction (LVEF 20-25% and severe RV dysfunction, thought to be secondary to prolonged ischemic time, resolved by f/up TTE 7/27), VT, pericardial effusion (incidentally found per TTE 7/27, decreased size noted on TTE 7/29), donor streptococcus salivarius bacteremia (s/p 7d course of ceftriaxone), and RUE DVT c/b HIT (s/p PLEX).  He ultimately discharged 8/3/20.     He has been readmitted as follows:  - 8/24/20-8/28/20:  hyperglycemia (BGs 500s), thought to be secondary to incorrect home insulin administration  - 9/22/20-10/5/20:  fevers and drainage from former ICD site, with Chest/abd CT concerning for cellulitis in the epigastric region.  He underwent surgical debridement of his left infraclavicular wound and debridement of midline chest tube wound on 9/23, which was c/b bleeding and required reoperation 9/28 for I&D of hematoma.  Cultures were positive for Pseudomonas aeruginosa, so he was treated with 4 weeks of cipro per Transplant ID and CVTS.  He developed leukopenia and moderate neutropenia, so his cellcept was held then restarted at low dose, and valcyte was stopped.  He received neupogen x 1.  He ultimately discharged with a wound vac.  - 12/9/20:  ED visit for right vision loss, and found to have a vitreous  "hemorrhage in his right eye and total retinal detachment of his left eye per ophthalmology.  He was then seen in the eye clinic 12/10, where he was advised an Avastin injection in the right eye (for proliferative diabetic retinopathy and vitreous hemorrhage).  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  He has no DSAs.  Baseline coronary angiogram has been deferred.  Last TTE 3/2021 showed normal graft function, with LVEF 55-60% and normal RV size/function.  Last RHC 1/2021 showed normal biventricular filling pressures, with RA 2, mPA 16, PCW 10, and CI 2.87.    Since his last visit, he states that he feels better overall.  His vision has continued to improve.  He has been walking much better, and notes improvement in his strength and endurance.  He is now able to walk 10 miles with no exertional concerns.  His breathing has been good, and he denies SOB, PND, and orthopnea.  His appetite has been good, and he denies nausea, vomiting, diarrhea, and constipation.  His weight has been stable, ranging 180-185#, and he denies fluid retention.  His lower extremity edema has improved.  He feels dizzy when rising too quickly, and denies falls.  He otherwise denies chest pain, palpitations, persistent dizziness, headaches, acute vision changes, fevers, chills, cough, sore throat, and signs of bleeding.      Serostatus:  CMV D+/R+  EBV D+/R+  Toxo D+/R-    Patient Active Problem List    Diagnosis Date Noted     Cardiogenic shock (H) 02/04/2021     Priority: Medium     Immunodeficiency, unspecified (H) 02/02/2021     Priority: Medium     CKD (chronic kidney disease) stage 4, GFR 15-29 ml/min (H) 02/02/2021     Priority: Medium     HIT " (heparin-induced thrombocytopenia) (H) 02/02/2021     Priority: Medium     Traction detachment of left retina 12/14/2020     Priority: Medium     Added automatically from request for surgery [id number]       Pseudomonas infection 10/05/2020     Priority: Medium     Need for prophylactic antibiotic 10/05/2020     Priority: Medium     Trina-Barr virus viremia 10/05/2020     Priority: Medium     Sepsis (H) 09/22/2020     Priority: Medium     Hyperglycemia 08/24/2020     Priority: Medium     Heart transplant, orthotopic, status (H) 07/20/2020     Priority: Medium     CHF (congestive heart failure) (H) 07/03/2020     Priority: Medium     Acute on chronic systolic congestive heart failure (H) 07/03/2020     Priority: Medium     Added automatically from request for surgery [id number]       Heart failure (H) 07/03/2020     Priority: Medium     Added automatically from request for surgery [id number]       Dental caries 07/03/2020     Priority: Medium     Added automatically from request for surgery [id number]       Heart replaced by transplant (H) 07/03/2020     Priority: Medium     Added automatically from request for surgery [id number]       Status post coronary angiogram 07/02/2020     Priority: Medium     Coronary artery disease involving native coronary artery of native heart without angina pectoris 06/18/2020     Priority: Medium     Added automatically from request for surgery [id number]       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, unspecified whether long term insulin use (H) 11/27/2019     Priority: Medium     Added automatically from request for surgery [id number]       Nuclear cataract, nonsenile 11/27/2019     Priority: Medium     Added automatically from request for surgery [id number]       Heart transplant candidate 07/18/2019     Priority: Medium     Systolic heart failure (H) 05/14/2019     Priority: Medium     Added automatically from request for surgery [id number]       CHANTEL  (obstructive sleep apnea)- severe (AHI 30) 10/06/2016     Priority: Medium     Study Date: 10/03/2016- (196.0 lbs) apnea/hypopnea index 30.8. REM AHI 40,  supine AHI n/a. RDI  33.1 . Lowest oxygen saturation 82.8%.  Time spent less than or equal to 88% 4.9 minutes.  Time spent less than or equal to 89% 7.3 minutes. CPAP final  pressure 7 cmH2O with AHI of 0.8.  Time in REM supine on final pressure 0 minutes. No consolidated sleep seen in supine position. During diagnostic portion of study, PLM index 18.2. During treatment portion of study,  PLM index 19.3.             Moderate nonproliferative diabetic retinopathy, without macular edema, associated with type 2 diabetes mellitus 08/16/2016     Priority: Medium     Cortical cataract of both eyes 08/15/2016     Priority: Medium     Secondary renal hyperparathyroidism (H) 07/26/2016     Priority: Medium     Proteinuria 03/18/2016     Priority: Medium     Vitamin D deficiency 03/18/2016     Priority: Medium     OA (osteoarthritis) of knee 03/18/2016     Priority: Medium     Chondromalacia of patella, unspecified laterality 03/18/2016     Priority: Medium     Pain in joint of left shoulder 03/18/2016     Priority: Medium     Tinnitus 03/18/2016     Priority: Medium     left        Ptosis of right eyelid 03/18/2016     Priority: Medium     Chronic systolic heart failure (H)      Priority: Medium     Echo 7/2015 LVEF 18%       Automatic implantable cardioverter-defibrillator - [employer] single lead ICD, Not Dependent 08/18/2015     Priority: Medium     Health Care Home 01/19/2015     Priority: Medium     *See Letters for [facility] Care Plan: Emergency Care Plan         CKD (chronic kidney disease) stage 3, GFR 30-59 ml/min 01/28/2013     Priority: Medium     CHF (NYHA class II, ACC/AHA stage C) (H) 08/15/2012     Priority: Medium     Hypertension goal BP (blood pressure) < 140/90 01/21/2011     Priority: Medium     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)  10/31/2010     Priority: Medium     Goal <8%       Hyperlipidemia LDL goal <100 10/31/2010     Priority: Medium     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 (H) 03/15/2010     Priority: Medium     MI and open heart with 1 stent placed in 2008; another minor MI in 2009.  MI on 2/19/15. Coronary angiogram from [facility] on 2/19/15 showed severe 3 vessel native CAD (all three vessels [LAD, LCx and RCA] reported to be 100% ocludded at their ostia). All his grafts were considered to be occluded except for LIMA-to-LAD, which was patent and supplied left-to-left, as well as, left-to-right collaterals. There were no targets suitable for revascularization and patient was put on medical therapy.        Morbid obesity (H) 09/26/2016     Priority: Low       PAST MEDICAL HISTORY:  Past Medical History:   Diagnosis Date     CAD (coronary artery disease)      CHF (congestive heart failure) (H)      CKD (chronic kidney disease), stage III      Cortical cataract of both eyes      Diabetes (H)      Hyperlipidemia      Hypertension      Infection due to Streptococcus mitis group 09/23/2020     Ischemic cardiomyopathy      Obesity      CHANTEL (obstructive sleep apnea)     occas cpap     Osteoarthritis        CURRENT MEDICATIONS:  Prescription Medications as of 5/11/2021       Rx Number Disp Refills Start End Last Dispensed Date Next Fill Date Owning Pharmacy    acetaminophen (TYLENOL) 325 MG tablet  60 tablet 3 9/2/2020    [facility] - [place], [place] - [address]    Sig: Take 3 tablets (975 mg) by mouth every 8 hours as needed for mild pain    Class: E-Prescribe    Route: Oral    Alcohol Swabs PADS  100 each 0 8/3/2020    [facility] - [place], [place] - [address]    Sig: Use to swab the area of the injection or sergio as directed   Per insurance coverage    Class: Local Print    aspirin (ASA)  81 MG chewable tablet  120 tablet 0 10/6/2020    [facility] [place], [place] - [address] [facility]    Sig: Take 1 tablet (81 mg) by mouth daily    Class: E-Prescribe    Route: Oral    atropine 1 % ophthalmic solution            Sig: Place 1 drop Into the left eye daily    Class: Historical    Route: Left Eye    blood glucose (NO BRAND SPECIFIED) test strip  400 strip 3 2021    [id number] [facility] [address] AT [address]    Sig: Use to test blood sugar 4 times daily or as directed.    Class: E-Prescribe    blood glucose monitoring (ACCU-CHEK FELIPE SMARTVIEW) meter device kit  1 kit 0 2017    [facility]  [place], [place] - [address] [place] [address]    Sig: Use to test blood sugar 3-4 times daily, as directed.    Class: E-Prescribe    blood glucose monitoring (SOFTCLIX) lancets  400 each 11 2021    [id number] [address] AT [address]    Si each 4 times daily Use to test blood sugars 2 times daily.    Class: E-Prescribe    Route: Does not apply    calcium carbonate 600 mg-vitamin D 400 units (CALTRATE) 600-400 MG-UNIT per tablet  180 tablet 3 2020    [facility] [address]    Sig: Take 1 tablet by mouth 2 times daily (with meals)    Class: E-Prescribe    Route: Oral    Continuous Blood Gluc Sensor (FREESTYLE JEREMY 14 DAY SENSOR) [facility]  2 each 11 2020    [facility]    Sig: Change every 14 days.    Class: Local Print    Continuous Blood Gluc Sensor (FREESTYLE JEREMY 14 DAY SENSOR) MISC  6 each 4 2020    [id number] [address] AT [address]    Sig: Change every 14 days.    Class: E-Prescribe    Notes to Pharmacy: Use to check BG 5 times daily.    furosemide (LASIX) 20 MG tablet  90 tablet 3 10/12/2020    [facility] [address]    Sig: Take 1 tablet (20 mg) by mouth daily    Class: E-Prescribe    Route: Oral    HUMALOG KWIKPEN 100 UNIT/ML soln  90 mL 1 11/10/2020    [facility] #[id number] - [facility] [address] AT [facility]    Sig: Inject 0-31 Units Subcutaneous 3 times daily (with meals) + Custom MEAL and SNACK corrective dosing   Approx 90 units daily max    Class: E-Prescribe    Cosign for Ordering: Accepted by Marisabel Prieto PA-C on 11/19/2020 10:04 PM    hydrALAZINE (APRESOLINE) 100 MG tablet  270 tablet 3 11/13/2020    [facility] [place] [address]    Sig: Take 1 tablet (100 mg) by mouth every 8 hours    Class: E-Prescribe    Route: Oral    insulin aspart (NOVOLOG PEN) 100 UNIT/ML pen  6 mL 0 10/5/2020    [address]    Sig: Inject 0-31 Units Subcutaneous 3 times daily (with meals) Custom MEAL and SNACK corrective dosing     BG less than 80, do not give Novolog.  BG , give  15 units.  -169, give  17 units.  -199 give 19 units.  -229 give 21 units.  -259 give 23 units.  -289 give 25 units.  -319 give 27 units.  -349 give 29 units.  BG >/= 350 give 31 units.  To be given with meal based on pre-meal blood glucose    Class: Local Print    Route: Subcutaneous    insulin isophane human (HUMULIN N PEN) 100 UNIT/ML injection  30 mL 11 3/2/2021    [facility] [place] #[id number] - [facility] [address] AT [facility]    Sig: Inject 35 Units Subcutaneous every morning    Class: E-Prescribe    Route: Subcutaneous    insulin isophane human (HUMULIN N PEN) 100 UNIT/ML injection  3 mL 1 10/5/2020    [facility] [address]    Sig: Inject 8 Units Subcutaneous At Bedtime    Class: E-Prescribe    Route: Subcutaneous    insulin pen needle (32G X 4 MM) 32G X 4 MM miscellaneous  100 each 0 8/3/2020     [facility] - [place], [place] - [address]    Sig: Use as directed by provider  Per insurance coverage    Class: Local Print    insulin pen needle (B-D U/F) 31G X 5 MM miscellaneous  100 each 3 2020    [facility] - [place] [address]    Si Units by Device route 2 times daily Use 2 pen needles daily or as directed.    Class: E-Prescribe    Route: Device    insulin pen needle (NOVOFINE) 30G X 8 MM miscellaneous  200 each 3 2020    [facility] #[id number] [facility] [address] AT [facility]    Sig: Inject 1 Box Subcutaneous 6 times daily Use 6 pen needles daily or as directed.    Class: E-Prescribe    Notes to Pharmacy: Has both Novolog Flex pen and several Humalog Kwik pen, but doesn't have correct needle.    Route: Subcutaneous    latanoprost (XALATAN) 0.005 % ophthalmic solution  15 mL 4 2021    [facility] #[id number] [facility] [address] AT [facility]    Sig: INSTILL 1 DROP IN BOTH EYES AT BEDTIME    Class: E-Prescribe    Notes to Pharmacy: **Patient requests 90 days supply**    magnesium oxide 400 MG CAPS  180 capsule 3 2020    [facility] - [place] [address]    Sig: Take 400 mg by mouth 2 times daily    Class: E-Prescribe    Route: Oral    mycophenolate (GENERIC EQUIVALENT) 250 MG capsule  240 capsule 11 2020    [facility] - [place] [address]    Sig: Take 4 capsules (1,000 mg) by mouth 2 times daily    Class: E-Prescribe    Notes to Pharmacy: TXP DT 2020 (Heart) TXP Dischg DT  DX Heart replaced by transplant Z94.1 TX Center [facility] ([place], [place])    Route: Oral    neomycin-polymixin-dexamethasone (MAXITROL) 0.1 % ophthalmic suspension  5 mL 0 2020    [facility] - [place], [place] - [address]    Sig:  Apply 1 drop to eye 4 times daily Instill into operative eye(s) per physician instructions.    Class: E-Prescribe    Route: Ophthalmic    neomycin-polymixin-dexamethasone (MAXITROL) ophthalmic ointment            Sig: Place 1 Application Into the left eye At Bedtime    Class: Historical    Route: Left Eye    order for DME  1 Units 0 1/11/2017        Sig: Auto CPAP 8-15 cmH20    Class: Sleep Center    pantoprazole (PROTONIX) 40 MG EC tablet    11/6/2020    [facility] [address]    Class: Historical    prednisoLONE acetate (PRED FORTE) 1 % ophthalmic suspension  1 Bottle 1 1/14/2021    [facility] #[id number] - [facility] [address] AT [facility] [address] [facility]    Sig: Place 1-2 drops Into the left eye 2 times daily    Class: E-Prescribe    Notes to Pharmacy: use 1 drop left eye 2 x daily x 1 week, then 1 drop 1 x daily x 1 week, then stop    Route: Left Eye    rosuvastatin (CRESTOR) 10 MG tablet  90 tablet 3 8/18/2020    [facility] [address]    Sig: Take 1 tablet (10 mg) by mouth daily    Class: E-Prescribe    Route: Oral    senna-docusate (SENOKOT-S/PERICOLACE) 8.6-50 MG tablet  60 tablet 0 10/5/2020    [facility] [place], [place] - [address] [facility] [address]    Sig: Take 1 tablet by mouth 2 times daily (hold for loose stools)    Class: E-Prescribe    Route: Oral    sulfamethoxazole-trimethoprim (BACTRIM) 400-80 MG tablet  90 tablet 3 8/18/2020    [facility] [address]    Sig: Take 1 tablet by mouth daily    Class: E-Prescribe    Route: Oral    tacrolimus (GENERIC EQUIVALENT) 1 MG capsule  300 capsule 11 1/7/2021    [facility] [address]    Sig: Take 5 capsules (5 mg) by mouth every morning AND 5 capsules (5 mg) every evening.    Class: E-Prescribe    Notes to Pharmacy: TXP DT 7/19/2020 (Heart),  "7/19/2020 (Heart) TXP Dischg DT 8/3/2020 DX Heart transplant Z94.1 [place] [facility] [place]    Route: Oral    tamsulosin (FLOMAX) 0.4 MG capsule  60 capsule 0 10/6/2020    [facility] - [place], [place] - [address] [facility]    Sig: Take 1 capsule (0.4 mg) by mouth daily    Class: E-Prescribe    Route: Oral      Clinic-Administered Medications as of 5/11/2021       Dose Frequency Start End    bevacizumab (AVASTIN) intravitreal inj 1.25 mg 1.25 mg EVERY 28 DAYS 12/10/2020 11/11/2021    Admin Instructions: Prn every 3-52 weeksLillianaMAYE Pickens [phone]    Route: Intravitreal          ROS:   Constitutional: No fever, chills, or sweats. Stable weight.   ENT: No visual disturbance, ear ache, epistaxis, sore throat.   Allergies/Immunologic: Negative.   Respiratory: No cough, hemoptysis.   Cardiovascular: As per HPI.   GI: No nausea, vomiting, hematemesis, melena, or hematochezia.   : No urinary frequency, dysuria, or hematuria.   Integument: Negative.   Psychiatric: Negative.   Neuro: Negative.   Endocrinology: Negative.   Musculoskeletal: Negative    Exam:  /76 (BP Location: Right arm, Patient Position: Chair, Cuff Size: Adult Regular)   Pulse 90   Ht 1.638 m (5' 4.5\")   Wt 82.5 kg (181 lb 14.4 oz)   SpO2 99%   BMI 30.74 kg/m    In general, the patient is a pleasant male in no apparent distress.    HEENT: NC/AT. PERRLA. EOMI.  Sclerae white, not injected.    Neck:  No adenopathy, No thyromegaly.    COR: No jugular venous distention when sitting upright.  RRR.  Normal S1 S2 splits physiologically.  No murmur, rub click, or gallop.    Lungs:  CTA. No rhonchi.    Abdomen: soft, nontender, nondistended.  No organomegaly.  Extremities:  No clubbing or cyanosis, mild bilateral LE edema with pitting above ankles.    Neuro: Alert & Oriented x 3, grossly non focal.  Integument: no open lesions, rashes, or jaundice.    Labs:  CBC RESULTS:   Lab Results "   Component Value Date    WBC 4.5 03/02/2021    RBC 4.17 (L) 03/02/2021    HGB 12.3 (L) 03/02/2021    HCT 39.3 (L) 03/02/2021    MCV 94 03/02/2021    MCH 29.5 03/02/2021    MCHC 31.3 (L) 03/02/2021    RDW 14.0 03/02/2021     03/02/2021       BMP RESULTS:  Lab Results   Component Value Date     03/02/2021    POTASSIUM 4.2 03/02/2021    CHLORIDE 108 03/02/2021    CO2 27 03/02/2021    ANIONGAP 7 03/02/2021     (H) 03/02/2021    BUN 36 (H) 03/02/2021    CR 1.83 (H) 03/02/2021    GFRESTIMATED 37 (L) 03/02/2021    GFRESTBLACK 43 (L) 03/02/2021    BRYANNA 9.2 03/02/2021      LIPID RESULTS:  Lab Results   Component Value Date    CHOL 133 01/05/2021    HDL 40 01/05/2021    LDL 58 01/05/2021    TRIG 179 (H) 01/05/2021    CHOLHDLRATIO 2.6 06/27/2015    NHDL 94 01/05/2021       IMMUNOSUPPRESSANT LEVELS  Lab Results   Component Value Date    TACROL 6.5 03/02/2021    DOSTAC 08pm 03/02/2021       No components found for: CK  Lab Results   Component Value Date    MAG 1.7 03/02/2021     Lab Results   Component Value Date    A1C 6.7 (H) 01/14/2021     Lab Results   Component Value Date    PHOS 3.4 03/02/2021     Lab Results   Component Value Date    NTBNPI 8,792 (H) 09/22/2020     Lab Results   Component Value Date    SAITESTMET SA FCS 01/05/2021    SAICELL Class I 01/05/2021    [id number] None 01/05/2021    [id number] None 01/05/2021    SAIREPCOM  01/05/2021      Test performed by modified procedure. Serum heat inactivated and tested   by a modified ([place]) protocol including fetal calf serum addition.   High-risk, mfi >3,000. Mod-risk, mfi 500-3,000.       Lab Results   Component Value Date    SAIITESTME SA FCS 01/05/2021    SAIICELL Class II 01/05/2021    [id number] None 01/05/2021    [id number] None 01/05/2021    SAIIREPCOM  01/05/2021      Test performed by modified procedure. Serum heat inactivated and tested   by a modified ([place]) protocol including fetal calf serum addition.   High-risk, mfi >3,000. Mod-risk,  mfi 500-3,000.       Lab Results   Component Value Date    CSPEC EDTA PLASMA 01/05/2021       Assessment and Plan:  Mr. Lucian Henderson is a 65yr old male with a history of CAD (s/p 3v CABG 2008), ICM s/p OHT 7/19/20, DMII, CKD, and HTN who presents to clinic for routine follow up.  A professional  was used for this visit.    Labs today show stable electrolytes, renal function, liver function, and blood counts.  CMV, EBV, AlloMap, and tacro levels are in process.      TTE results from today are in process.    Mr. Tee Henderson appears well today.  His BPs remain stable.  His weight remains stable, and he appears euvolemic.  Advised that he continue his current meds, with no changes, and will plan for him to follow-up per protocol or sooner should new concerns arise.    Of note, he stated that a family member is getting  in [place], and that he and his wife are considering going.  He states that he will be staying with family.  Discussed that should he and his wife decide to go, that they should mask, wash hands, and social distance as able when in large groups.  Asked that he call with any questions about this.       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  He ultimately discharged 8/3/20.     His biopsies have remained negative for rejection, and recent AlloMap scores have remained < 36.  He has no DSAs.  Baseline coronary angiogram has been deferred.  Last TTE 3/2021 showed normal graft function, with LVEF 55-60% and normal RV size/function.  Last RHC 1/2021 showed normal biventricular filling pressures, with RA 2, mPA 16, PCW 10, and  "CI 2.87.      Serostatus:  - CMV D+/R+  - EBV D+/R+  - Toxo D+/R-     Immunosuppression:  - MMF 1000mg twice daily  - tacro, goal level 6-8.  Level today in process.     PPx:  - CAV:  Aspirin 81mg daily and rosuvastatin 10mg daily  - GI:  Pantoprazole 40mg daily  - Osteoporosis:  Calcium/vitamin D supplements  - Toxo:  Single strength bactrim, lifelong ppx --> will discuss decreasing dose with Dr. Sheridan, due to renal function     Graft function:  - BPs:  Stable, continue hydral 100mg TID  - fluid status:  Euvolemic, taking lasix 20mg daily due to ongoing LE edema.     Health maintenance:  - received flu shot  - needs baseline coronary angiogram --> will do it with his annual per Dr. hSeridan.  - completed COVID vaccinations       DMII  Follows with endo and PCP.     HIT  HIT antibody + 7/2020.  CORRINE +.  No heparin products.     RIJ and axillary vein DVT, nonocclusive  Right cephalic vein occlusive thrombus  S/p course of anticoagulation.     Total retinal detachment of the left eye, s/p retinectomy 12/21/20  Proliferative diabetic retinopathy  Right vitreous hemorrhage  He presented to the ED 12/9 with right vision loss.  Ophthalmology was consulted, and found that he had a vitreous hemorrhage in his right eye and total retinal detachment of his left eye.  He was then seen in the eye clinic 12/10, where he was advised an Avastin injection in the right eye (for proliferative diabetic retinopathy and vitreous hemorrhage).  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          The above was reviewed with Mr. Tee Henderson, who verbalized understanding and will call with further questions/concerns.    45 minutes spent with patient, along " with preparing for visit, reviewing follow up, and completing documentation.      Arlin Guajardo DNP, FNP-BC, CHFN  Advanced Heart Failure Nurse Practitioner  [facility]  Patient Care Team:  No Ref-Primary, Physician as PCP - Diane Groves APRN CNP as Nurse Practitioner (Cardiology)  Arvin Sheridan MD as MD (Cardiology)  Yue Dowd MD as MD (INTERNAL MEDICINE - ENDOCRINOLOGY, DIABETES & METABOLISM)  Christelle Pettit RN as Transplant Coordinator (Cardiology)  Negrito Rai [facility] as Pharmacist (Pharmacist)  [facility] ([facility] ([facility] ([place]  Triny Bunch MD as Assigned Surgical Provider  Arvin Sheridan MD as Assigned Heart and Vascular Provider  Anna Archuleta PA-C as Assigned PCP  Marisabel Prieto PA-C as Assigned Endocrinology Provider      Please do not hesitate to contact me if you have any questions/concerns.     Sincerely,     Arlin Guajardo NP

## 2021-05-12 ENCOUNTER — OFFICE VISIT (OUTPATIENT)
Dept: OPHTHALMOLOGY | Facility: CLINIC | Age: 66
End: 2021-05-12
Attending: OPHTHALMOLOGY
Payer: COMMERCIAL

## 2021-05-12 DIAGNOSIS — Z79.4 TYPE 2 DIABETES MELLITUS WITH DIABETIC NEPHROPATHY, WITH LONG-TERM CURRENT USE OF INSULIN (H): ICD-10-CM

## 2021-05-12 DIAGNOSIS — E11.3513 TYPE 2 DIABETES MELLITUS WITH PROLIFERATIVE RETINOPATHY OF BOTH EYES AND MACULAR EDEMA, UNSPECIFIED WHETHER LONG TERM INSULIN USE (H): Primary | ICD-10-CM

## 2021-05-12 DIAGNOSIS — E11.21 TYPE 2 DIABETES MELLITUS WITH DIABETIC NEPHROPATHY, WITH LONG-TERM CURRENT USE OF INSULIN (H): ICD-10-CM

## 2021-05-12 DIAGNOSIS — Z94.1 HEART TRANSPLANT, ORTHOTOPIC, STATUS (H): ICD-10-CM

## 2021-05-12 DIAGNOSIS — Z94.1 HEART TRANSPLANT, ORTHOTOPIC, STATUS (H): Primary | ICD-10-CM

## 2021-05-12 DIAGNOSIS — H43.11 VITREOUS HEMORRHAGE OF RIGHT EYE (H): ICD-10-CM

## 2021-05-12 LAB
ALLOMAP: NORMAL
EBV DNA # SPEC NAA+PROBE: NORMAL {COPIES}/ML
EBV DNA SPEC NAA+PROBE-LOG#: NORMAL {LOG_COPIES}/ML

## 2021-05-12 PROCEDURE — 92134 CPTRZ OPH DX IMG PST SGM RTA: CPT | Performed by: OPHTHALMOLOGY

## 2021-05-12 PROCEDURE — G0463 HOSPITAL OUTPT CLINIC VISIT: HCPCS

## 2021-05-12 PROCEDURE — 250N000011 HC RX IP 250 OP 636: Performed by: OPHTHALMOLOGY

## 2021-05-12 PROCEDURE — 67028 INJECTION EYE DRUG: CPT | Mod: RT | Performed by: OPHTHALMOLOGY

## 2021-05-12 RX ADMIN — Medication 1.25 MG: at 09:34

## 2021-05-12 ASSESSMENT — VISUAL ACUITY
OS_SC: 20/400
OD_SC+: -2
OD_PH_SC: 20/50
OD_SC: 20/60
METHOD: SNELLEN - LINEAR

## 2021-05-12 ASSESSMENT — CONF VISUAL FIELD
OD_SUPERIOR_NASAL_RESTRICTION: 3
OS_INFERIOR_TEMPORAL_RESTRICTION: 3
OS_SUPERIOR_TEMPORAL_RESTRICTION: 3
OD_INFERIOR_TEMPORAL_RESTRICTION: 3
OD_SUPERIOR_TEMPORAL_RESTRICTION: 3
OS_INFERIOR_NASAL_RESTRICTION: 3
OS_SUPERIOR_NASAL_RESTRICTION: 3

## 2021-05-12 ASSESSMENT — TONOMETRY
OD_IOP_MMHG: 18
OS_IOP_MMHG: 22
IOP_METHOD: TONOPEN

## 2021-05-12 ASSESSMENT — SLIT LAMP EXAM - LIDS
COMMENTS: PTOSIS OD>OS
COMMENTS: PTOSIS OD>OS

## 2021-05-12 ASSESSMENT — EXTERNAL EXAM - LEFT EYE: OS_EXAM: NORMAL

## 2021-05-12 ASSESSMENT — EXTERNAL EXAM - RIGHT EYE: OD_EXAM: NORMAL

## 2021-05-12 NOTE — PROGRESS NOTES
CC: follow up proliferative diabetic retinopathy   vitreous hemorrhage right eye   Left eye status post PPV/MP/retinectomy 12/21/20. 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 Denies eye pain.    Interval: Patient reports improved vision in both eyes. Taking latanoprost at bedtime each eye. Pred forte every other day left eye. right VH last visit.     Optical Coherence Tomography: 5-12-21  Right eye: good foveal contour; few cystic changes and exudates  Left eye: irregular retina; mild Epiretinal membrane; nasal edema; inferior area of subretinal fluid. stable     previously had an extensive discussion about guarded prognosis of left eye. Patient was found to have funnel RD that despite best efforts, was not able to be . Nonetheless, the temporal folded retina appears flat on exam today and patient is happy with the mild improved vision.  VA stable with 20/400 left eye   vitreous hemorrhage right eye improving      Plan:   - Plan for avastin inj right eye today   - Continue latanoprost at bedtime both eyes   - Predforte every other day left eye   - Retina detachment and endophthalmitis precautions were discussed with the patient and was asked to return if any of the those occur.  -  Follow up in one month for avastin right eye   Will need laser right eye in the future    Torey aMrk MD  Ophthalmology PGY-3      ~~~~~~~~~~~~~~~~~~~~~~~~~~~~~~~~~~   Complete documentation of historical and exam elements from today's encounter can be found in the full encounter summary report (not reduplicated in this progress note).  I personally obtained the chief complaint(s) and history of present illness.  I confirmed and edited as necessary the review of systems, past medical/surgical history, family history, social history, and examination findings as documented by others; and I examined the patient myself.  I personally reviewed the relevant tests, images, and reports as documented above.  I personally  reviewed the ophthalmic test(s) associated with this encounter, agree with the interpretation(s) as documented by the resident/fellow, and have edited the corresponding report(s) as necessary.   I formulated and edited as necessary the assessment and plan and discussed the findings and management plan with the patient and family and I was present for the entire procedure performed by the resident/fellow.    Triny Bunch MD   of Ophthalmology.  Retina Service   Department of Ophthalmology and Visual Neurosciences   [facility]  Phone: [phone]   Fax: [phone]

## 2021-05-12 NOTE — NURSING NOTE
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

## 2021-05-13 ENCOUNTER — APPOINTMENT (OUTPATIENT)
Dept: INTERPRETER SERVICES | Facility: CLINIC | Age: 66
End: 2021-05-13
Payer: COMMERCIAL

## 2021-05-13 ENCOUNTER — TELEPHONE (OUTPATIENT)
Dept: TRANSPLANT | Facility: CLINIC | Age: 66
End: 2021-05-13

## 2021-05-13 PROBLEM — Z94.1 HEART TRANSPLANT, ORTHOTOPIC, STATUS (H): Status: ACTIVE | Noted: 2020-07-20

## 2021-05-14 ENCOUNTER — APPOINTMENT (OUTPATIENT)
Dept: INTERPRETER SERVICES | Facility: CLINIC | Age: 66
End: 2021-05-14
Payer: COMMERCIAL

## 2021-05-14 ENCOUNTER — TELEPHONE (OUTPATIENT)
Dept: TRANSPLANT | Facility: CLINIC | Age: 66
End: 2021-05-14

## 2021-05-14 LAB
ALLOMAP SCORE (EXTERNAL): 32 (ref 0–40)
NEGATIVE PREDICTIVE VALUE PERCENT (EXTERNAL): 99 %
POSITIVE PREDICTIVE VALUE PERCENT (EXTERNAL): 2.9 %

## 2021-05-14 NOTE — TELEPHONE ENCOUNTER
Called patient using [employer] to communicate allomap score of 32 to patient--no concerns for ACR. Reviewed that recent echo was also WNL. Pt verbalized understanding of information and had no questions.

## 2021-05-19 ENCOUNTER — APPOINTMENT (OUTPATIENT)
Dept: INTERPRETER SERVICES | Facility: CLINIC | Age: 66
End: 2021-05-19
Payer: COMMERCIAL

## 2021-05-23 NOTE — TELEPHONE ENCOUNTER
Stat ECG was performed on 05/23/21 at 0806  ECG was given to Ami GONSALVES     VAD Multidisciplinary Review   Multidisciplinary review date: 8/23/19   Inclusion Criteria   Discussed VAD with patient and/or decision makers. Reviewed anticipated survival benefit, anticipated functional improvement, risks, and benefits. Patient and/or decision maker verbalize understanding and agree to VAD implant?: No   VAD is elective procedure?: Yes   NYHA class: IV   INTERMACS score: 4   Patient has: failed to respond to optimal medical management for at least 45 of the last 60 days   Cardiopulmonary stress testing completed?: Yes   Cardiopulmonary stress test detail: MVO2 < 14 ml/kg/min   LVEF is: < 25%   Exclusion Criteria   Has condition, other than heart failure, which would limit survival to < 2 years?: No   Cardiomyopathy with restrictive physiology, unless severe LV systolic failure and LV dilation present?: No   Technical obstacles that pose and inordinately high surgical risk in the judgment of the MCS surgeon?: No   Active systemic infection? (unless ALL of following criteria met: negative blood cultures x 2 days, antibiotic treatment x 7 days and Infectious Disease clearance pre-operatively): No   Presence of active malignancy AND life expectancy < 2 years?: No   Stroke within the last six weeks, unless cleared by neurology team: No   Diagnosed with severe, irreversible pulmonary disease (supplemental O2 usage, FEV1 < 50% predicted): No   Pulmonary hypertension as a primary diagnosis: No   Chronic dialysis: No   Evidence of significant peripheral vascular disease accompanied by resting leg pain or ulceration unless treatment plan is in place: No   Carotid artery disease (>80% extracranial stenosis on Doppler) that could result in an adverse neurological event if left untreated: No   Evidence of an untreated abdominal aortic aneurysm >5 cm as measured by abdominal ultrasounds within the last 180 days unless treatment plan in place: No   Severe or irreversible hepatic disease, evidence of shock  liver, and/o biopsy-proven cirrhosis: No   Active contraindication to anticoagulation, INR > 2.5 in absence of concurrent anticoagulation therapy, platelet < 50,000, history of intolerance to anticoagulation, or other coagulopathy unless Hematology consulted and plan is in place: No   Acute valvular infective endocarditis with bacteremia: No   Neuromuscular disease, psychiatric diagnosis, dementia or other process that severely compromises ability to use and care for external system components or to ambulate/exercise: No   Inability to understand the procedure, risk involved, or to comply with follow-up evaluation by VAD team: No   For menstruating females: Current pregnancy or unable/unwilling to follow contraception plan: Not applicable   Relative Contraindications   Alcohol and/or recreational drug abuse within past six months: No   Significant history of depression or other mental illness, refractory to therapy or thought to be a risk to successful outcome with MCS, as per assessment of trained professional: No   Demonstrated on-going non-compliance with demonstrated inability to comply with medical recommendations on multiple occasions: No   Unsafe living environment or lack of adequate support system: No   Lack of adequate insurance coverage or waiver by hospital administration: No   Evidence of other ongoing physical, financial, or psychosocial issues that will preclude the intended goal of safety and improvements in quality and duration of life, unless a plan for resolution and support is in process: No   Multidisciplinary Decision   Decision: Approved Comment: Not presently listed - going to have repeat hemodynamics- approved for LVAD for now and likely to be listed    Implant as: Bridge to Transplant

## 2021-05-26 NOTE — Clinical Note
"Oncology Rooming Note    May 26, 2021 9:28 AM   Dora Barrera is a 38 year old female who presents for:    Chief Complaint   Patient presents with     Oncology Clinic Visit     WOUND CHECK      Initial Vitals: BP (!) 161/111   Pulse 103   Temp 99  F (37.2  C) (Oral)   Resp 19   Wt 120.4 kg (265 lb 6.4 oz)   SpO2 96%   BMI 48.54 kg/m   Estimated body mass index is 48.54 kg/m  as calculated from the following:    Height as of 5/6/21: 1.575 m (5' 2\").    Weight as of this encounter: 120.4 kg (265 lb 6.4 oz). Body surface area is 2.29 meters squared.  No Pain (0) Comment: Data Unavailable   No LMP recorded. (Menstrual status: Birth Control).  Allergies reviewed: Yes  Medications reviewed: Yes    Medications: Medication refills not needed today.  Pharmacy name entered into [employer]:    [facility] - [address] [id number] - [place], [place] - [address]  AT [address] [address]    Clinical concerns: BP reading 161/111. Recheck at  146/100. Provider notified.       Fay Su MA            " Biopsy obtained and sent to lab.

## 2021-05-29 DIAGNOSIS — E11.65 TYPE 2 DIABETES MELLITUS WITH HYPERGLYCEMIA, WITH LONG-TERM CURRENT USE OF INSULIN (H): ICD-10-CM

## 2021-05-29 DIAGNOSIS — Z79.4 TYPE 2 DIABETES MELLITUS WITH HYPERGLYCEMIA, WITH LONG-TERM CURRENT USE OF INSULIN (H): ICD-10-CM

## 2021-05-29 RX ORDER — INSULIN LISPRO 100 [IU]/ML
INJECTION, SOLUTION INTRAVENOUS; SUBCUTANEOUS
Qty: 90 ML | Refills: 3 | Status: SHIPPED | OUTPATIENT
Start: 2021-05-29 | End: 2021-08-04

## 2021-05-29 NOTE — TELEPHONE ENCOUNTER
HUMALOG KWIKPEN 100 UNIT/ML soln        Last Written Prescription Date:  11/10/20  Last Fill Quantity: 90mL,   # refills: 1  Last Office Visit : 02/02/21  Future Office visit:  None scheduled    Routing refill request to provider for review/approval because:  Insulin - refilled per clinic

## 2021-06-03 DIAGNOSIS — Z79.52 LONG TERM SYSTEMIC STEROID USER: Primary | ICD-10-CM

## 2021-06-07 DIAGNOSIS — Z94.1 HEART TRANSPLANT, ORTHOTOPIC, STATUS (H): ICD-10-CM

## 2021-06-07 RX ORDER — ROSUVASTATIN CALCIUM 10 MG/1
TABLET, COATED ORAL
Qty: 90 TABLET | Refills: 2 | Status: SHIPPED | OUTPATIENT
Start: 2021-06-07 | End: 2022-02-23

## 2021-06-09 ENCOUNTER — OFFICE VISIT (OUTPATIENT)
Dept: OPHTHALMOLOGY | Facility: CLINIC | Age: 66
End: 2021-06-09
Attending: OPHTHALMOLOGY
Payer: COMMERCIAL

## 2021-06-09 DIAGNOSIS — E11.3513 TYPE 2 DIABETES MELLITUS WITH PROLIFERATIVE RETINOPATHY OF BOTH EYES AND MACULAR EDEMA, UNSPECIFIED WHETHER LONG TERM INSULIN USE (H): Primary | ICD-10-CM

## 2021-06-09 DIAGNOSIS — Z11.59 ENCOUNTER FOR SCREENING FOR OTHER VIRAL DISEASES: ICD-10-CM

## 2021-06-09 PROCEDURE — 67028 INJECTION EYE DRUG: CPT | Mod: RT | Performed by: OPHTHALMOLOGY

## 2021-06-09 PROCEDURE — G0463 HOSPITAL OUTPT CLINIC VISIT: HCPCS

## 2021-06-09 PROCEDURE — G0463 HOSPITAL OUTPT CLINIC VISIT: HCPCS | Mod: 25

## 2021-06-09 PROCEDURE — 250N000011 HC RX IP 250 OP 636: Performed by: OPHTHALMOLOGY

## 2021-06-09 PROCEDURE — 92134 CPTRZ OPH DX IMG PST SGM RTA: CPT | Performed by: OPHTHALMOLOGY

## 2021-06-09 RX ADMIN — Medication 1.25 MG: at 09:33

## 2021-06-09 ASSESSMENT — SLIT LAMP EXAM - LIDS
COMMENTS: PTOSIS OD>OS
COMMENTS: PTOSIS OD>OS

## 2021-06-09 ASSESSMENT — CONF VISUAL FIELD
OS_SUPERIOR_NASAL_RESTRICTION: 3
OD_SUPERIOR_NASAL_RESTRICTION: 3
OD_INFERIOR_TEMPORAL_RESTRICTION: 3
METHOD: COUNTING FINGERS
OS_SUPERIOR_TEMPORAL_RESTRICTION: 3
OS_INFERIOR_TEMPORAL_RESTRICTION: 3
OD_SUPERIOR_TEMPORAL_RESTRICTION: 3
OS_INFERIOR_NASAL_RESTRICTION: 3

## 2021-06-09 ASSESSMENT — VISUAL ACUITY
OS_PH_SC: 20/500
OD_SC: 20/50
METHOD: SNELLEN - LINEAR
OS_SC: 20/600

## 2021-06-09 ASSESSMENT — TONOMETRY
OD_IOP_MMHG: 18
IOP_METHOD: TONOPEN
OS_IOP_MMHG: 16

## 2021-06-09 ASSESSMENT — EXTERNAL EXAM - LEFT EYE: OS_EXAM: NORMAL

## 2021-06-09 ASSESSMENT — EXTERNAL EXAM - RIGHT EYE: OD_EXAM: NORMAL

## 2021-06-09 NOTE — NURSING NOTE
Chief Complaints and History of Present Illnesses   Patient presents with     Diabetic Retinopathy Follow Up     PDR recheck with injection only planned for RE     Chief Complaint(s) and History of Present Illness(es)     Diabetic Retinopathy Follow Up     Laterality: right eye    Quality: blurred vision    Frequency: constantly    Timing: throughout the day    Course: stable    Associated symptoms: Negative for eye pain, floaters, flashes, dryness and tearing    Pain scale: 0/10    Comments: PDR recheck with injection only planned for RE              Comments     PDR recheck with injection only planned for RE.  Pt states VA stable and eyes comfortable. Uses art tears as needed.  MAYE Mcintosh COT 8:50 AM 06/09/2021

## 2021-06-09 NOTE — PROGRESS NOTES
CC: follow up proliferative diabetic retinopathy   vitreous hemorrhage right eye   Left eye status post PPV/MP/retinectomy 12/21/20. 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 Denies eye pain.    Interval: Patient reports improved vision in both eyes. Taking latanoprost at bedtime each eye. Pred forte every other day left eye. right VH last visit.     Optical Coherence Tomography: 06/09/21   Right eye: good foveal contour; few cystic changes and exudates- improving  Left eye: irregular retina; mild Epiretinal membrane; nasal edema; inferior area of subretinal fluid. stable     previously had an extensive discussion about guarded prognosis of left eye. Patient was found to have funnel RD that despite best efforts, was not able to be . Nonetheless, the temporal folded retina appears flat on exam today and patient is happy with the mild improved vision.  VA stable with 20/400 left eye   vitreous hemorrhage right eye improving      Plan:   - Plan for avastin inj right eye today   - Continue latanoprost at bedtime both eyes   - Predforte every other day left eye   - Retina detachment and endophthalmitis precautions were discussed with the patient and was asked to return if any of the those occur.  -  Follow up in 6 weeks for Optical Coherence Tomography and fluorescein angiography transits right eye   poss avastin right eye vs laser Panretinal laser photocoagulation (PRP) filling  ~~~~~~~~~~~~~~~~~~~~~~~~~~~~~~~~~~   Complete documentation of historical and exam elements from today's encounter can be found in the full encounter summary report (not reduplicated in this progress note).  I personally obtained the chief complaint(s) and history of present illness.  I confirmed and edited as necessary the review of systems, past medical/surgical history, family history, social history, and examination findings as documented by others; and I examined the patient myself.  I personally reviewed the relevant  tests, images, and reports as documented above.  I personally reviewed the ophthalmic test(s) associated with this encounter, agree with the interpretation(s) as documented by the resident/fellow, and have edited the corresponding report(s) as necessary.   I formulated and edited as necessary the assessment and plan and discussed the findings and management plan with the patient and family and I was present for the entire procedure performed by the resident/fellow.    Triny Bunch MD   of Ophthalmology.  Retina Service   Department of Ophthalmology and Visual Neurosciences   [facility]  Phone: [phone]   Fax: [phone]

## 2021-07-14 ENCOUNTER — TELEPHONE (OUTPATIENT)
Dept: TRANSPLANT | Facility: CLINIC | Age: 66
End: 2021-07-14

## 2021-07-14 DIAGNOSIS — E11.21 TYPE 2 DIABETES MELLITUS WITH DIABETIC NEPHROPATHY, WITH LONG-TERM CURRENT USE OF INSULIN (H): ICD-10-CM

## 2021-07-14 DIAGNOSIS — Z79.4 TYPE 2 DIABETES MELLITUS WITH DIABETIC NEPHROPATHY, WITH LONG-TERM CURRENT USE OF INSULIN (H): ICD-10-CM

## 2021-07-14 DIAGNOSIS — R73.09 ELEVATED HEMOGLOBIN A1C: ICD-10-CM

## 2021-07-14 DIAGNOSIS — Z12.5 PROSTATE CANCER SCREENING: ICD-10-CM

## 2021-07-14 DIAGNOSIS — Z13.220 LIPID SCREENING: ICD-10-CM

## 2021-07-14 DIAGNOSIS — Z94.1 HEART TRANSPLANT, ORTHOTOPIC, STATUS (H): Primary | ICD-10-CM

## 2021-07-14 RX ORDER — LIDOCAINE 40 MG/G
CREAM TOPICAL
Status: CANCELLED | OUTPATIENT
Start: 2021-07-14

## 2021-07-14 RX ORDER — POTASSIUM CHLORIDE 1500 MG/1
20 TABLET, EXTENDED RELEASE ORAL
Status: CANCELLED | OUTPATIENT
Start: 2021-07-14

## 2021-07-14 RX ORDER — POTASSIUM CHLORIDE 1500 MG/1
40 TABLET, EXTENDED RELEASE ORAL
Status: CANCELLED | OUTPATIENT
Start: 2021-07-14

## 2021-07-14 RX ORDER — SODIUM CHLORIDE 9 MG/ML
INJECTION, SOLUTION INTRAVENOUS CONTINUOUS
Status: CANCELLED | OUTPATIENT
Start: 2021-07-14

## 2021-07-16 DIAGNOSIS — Z94.1 HEART TRANSPLANT, ORTHOTOPIC, STATUS (H): Primary | ICD-10-CM

## 2021-07-19 ENCOUNTER — TELEPHONE (OUTPATIENT)
Dept: TRANSPLANT | Facility: CLINIC | Age: 66
End: 2021-07-19

## 2021-07-19 ENCOUNTER — OFFICE VISIT (OUTPATIENT)
Dept: CARDIOLOGY | Facility: CLINIC | Age: 66
End: 2021-07-19
Attending: INTERNAL MEDICINE
Payer: COMMERCIAL

## 2021-07-19 ENCOUNTER — HOSPITAL ENCOUNTER (OUTPATIENT)
Dept: CARDIOLOGY | Facility: CLINIC | Age: 66
Discharge: HOME | End: 2021-07-19
Attending: INTERNAL MEDICINE | Admitting: INTERNAL MEDICINE
Payer: COMMERCIAL

## 2021-07-19 ENCOUNTER — LAB (OUTPATIENT)
Dept: LAB | Facility: CLINIC | Age: 66
End: 2021-07-19
Payer: COMMERCIAL

## 2021-07-19 ENCOUNTER — ANCILLARY PROCEDURE (OUTPATIENT)
Dept: GENERAL RADIOLOGY | Facility: CLINIC | Age: 66
End: 2021-07-19
Attending: INTERNAL MEDICINE
Payer: COMMERCIAL

## 2021-07-19 ENCOUNTER — ANCILLARY PROCEDURE (OUTPATIENT)
Dept: BONE DENSITY | Facility: CLINIC | Age: 66
End: 2021-07-19
Attending: INTERNAL MEDICINE
Payer: COMMERCIAL

## 2021-07-19 ENCOUNTER — TELEPHONE (OUTPATIENT)
Dept: CARDIOLOGY | Facility: CLINIC | Age: 66
End: 2021-07-19

## 2021-07-19 ENCOUNTER — LAB (OUTPATIENT)
Dept: LAB | Facility: CLINIC | Age: 66
End: 2021-07-19
Attending: INTERNAL MEDICINE

## 2021-07-19 ENCOUNTER — RESULTS ONLY (OUTPATIENT)
Dept: OPHTHALMOLOGY | Facility: CLINIC | Age: 66
End: 2021-07-19

## 2021-07-19 VITALS
DIASTOLIC BLOOD PRESSURE: 81 MMHG | WEIGHT: 190 LBS | SYSTOLIC BLOOD PRESSURE: 144 MMHG | OXYGEN SATURATION: 95 % | HEIGHT: 65 IN | HEART RATE: 60 BPM | BODY MASS INDEX: 31.65 KG/M2

## 2021-07-19 DIAGNOSIS — Z94.1 HEART TRANSPLANT, ORTHOTOPIC, STATUS (H): ICD-10-CM

## 2021-07-19 DIAGNOSIS — Z79.52 LONG TERM SYSTEMIC STEROID USER: ICD-10-CM

## 2021-07-19 DIAGNOSIS — Z94.1 HEART TRANSPLANT, ORTHOTOPIC, STATUS (H): Primary | ICD-10-CM

## 2021-07-19 DIAGNOSIS — Z11.59 ENCOUNTER FOR SCREENING FOR OTHER VIRAL DISEASES: ICD-10-CM

## 2021-07-19 DIAGNOSIS — Z13.220 LIPID SCREENING: ICD-10-CM

## 2021-07-19 DIAGNOSIS — E11.21 TYPE 2 DIABETES MELLITUS WITH DIABETIC NEPHROPATHY, WITH LONG-TERM CURRENT USE OF INSULIN (H): ICD-10-CM

## 2021-07-19 DIAGNOSIS — Z79.4 TYPE 2 DIABETES MELLITUS WITH DIABETIC NEPHROPATHY, WITH LONG-TERM CURRENT USE OF INSULIN (H): ICD-10-CM

## 2021-07-19 DIAGNOSIS — Z12.5 PROSTATE CANCER SCREENING: ICD-10-CM

## 2021-07-19 LAB
ALBUMIN SERPL-MCNC: 3.8 G/DL (ref 3.4–5)
ALP SERPL-CCNC: 90 U/L (ref 40–150)
ALT SERPL W P-5'-P-CCNC: 21 U/L (ref 0–70)
ANION GAP SERPL CALCULATED.3IONS-SCNC: 3 MMOL/L (ref 3–14)
AST SERPL W P-5'-P-CCNC: 22 U/L (ref 0–45)
BASOPHILS # BLD MANUAL: 0 10E3/UL (ref 0–0.2)
BASOPHILS NFR BLD MANUAL: 0 %
BILIRUB SERPL-MCNC: 0.5 MG/DL (ref 0.2–1.3)
BUN SERPL-MCNC: 37 MG/DL (ref 7–30)
CALCIUM SERPL-MCNC: 9.1 MG/DL (ref 8.5–10.1)
CHLORIDE BLD-SCNC: 110 MMOL/L (ref 94–109)
CHOLEST SERPL-MCNC: 123 MG/DL
CO2 SERPL-SCNC: 28 MMOL/L (ref 20–32)
CREAT SERPL-MCNC: 2.12 MG/DL (ref 0.66–1.25)
EOSINOPHIL # BLD MANUAL: 0.1 10E3/UL (ref 0–0.7)
EOSINOPHIL NFR BLD MANUAL: 2 %
ERYTHROCYTE [DISTWIDTH] IN BLOOD BY AUTOMATED COUNT: 13.7 % (ref 10–15)
FASTING STATUS PATIENT QL REPORTED: ABNORMAL
GFR SERPL CREATININE-BSD FRML MDRD: 31 ML/MIN/1.73M2
GLUCOSE BLD-MCNC: 158 MG/DL (ref 70–99)
HBA1C MFR BLD: 7.3 % (ref 0–5.6)
HCT VFR BLD AUTO: 42.1 % (ref 40–53)
HDLC SERPL-MCNC: 36 MG/DL
HGB BLD-MCNC: 13.3 G/DL (ref 13.3–17.7)
LDLC SERPL CALC-MCNC: 43 MG/DL
LVEF ECHO: NORMAL
LYMPHOCYTES # BLD MANUAL: 0.7 10E3/UL (ref 0.8–5.3)
LYMPHOCYTES NFR BLD MANUAL: 13 %
MAGNESIUM SERPL-MCNC: 1.8 MG/DL (ref 1.6–2.3)
MCH RBC QN AUTO: 30.2 PG (ref 26.5–33)
MCHC RBC AUTO-ENTMCNC: 31.6 G/DL (ref 31.5–36.5)
MCV RBC AUTO: 96 FL (ref 78–100)
MONOCYTES # BLD MANUAL: 0.4 10E3/UL (ref 0–1.3)
MONOCYTES NFR BLD MANUAL: 8 %
NEUTROPHILS # BLD MANUAL: 4.2 10E3/UL (ref 1.6–8.3)
NEUTROPHILS NFR BLD MANUAL: 77 %
NONHDLC SERPL-MCNC: 87 MG/DL
PHOSPHATE SERPL-MCNC: 3.4 MG/DL (ref 2.5–4.5)
PLAT MORPH BLD: ABNORMAL
PLATELET # BLD AUTO: 195 10E3/UL (ref 150–450)
POTASSIUM BLD-SCNC: 4.3 MMOL/L (ref 3.4–5.3)
PROT SERPL-MCNC: 6.9 G/DL (ref 6.8–8.8)
PSA SERPL-MCNC: 0.17 UG/L (ref 0–4)
RBC # BLD AUTO: 4.41 10E6/UL (ref 4.4–5.9)
RBC MORPH BLD: ABNORMAL
SARS-COV-2 RNA RESP QL NAA+PROBE: NEGATIVE
SODIUM SERPL-SCNC: 141 MMOL/L (ref 133–144)
TACROLIMUS BLD-MCNC: 6.7 UG/L (ref 5–15)
TME LAST DOSE: NORMAL H
TME LAST DOSE: NORMAL H
TRIGL SERPL-MCNC: 220 MG/DL
WBC # BLD AUTO: 5.4 10E3/UL (ref 4–11)

## 2021-07-19 PROCEDURE — 86832 HLA CLASS I HIGH DEFIN QUAL: CPT | Performed by: INTERNAL MEDICINE

## 2021-07-19 PROCEDURE — 86833 HLA CLASS II HIGH DEFIN QUAL: CPT | Performed by: INTERNAL MEDICINE

## 2021-07-19 PROCEDURE — 85027 COMPLETE CBC AUTOMATED: CPT | Performed by: PATHOLOGY

## 2021-07-19 PROCEDURE — U0003 INFECTIOUS AGENT DETECTION BY NUCLEIC ACID (DNA OR RNA); SEVERE ACUTE RESPIRATORY SYNDROME CORONAVIRUS 2 (SARS-COV-2) (CORONAVIRUS DISEASE [COVID-19]), AMPLIFIED PROBE TECHNIQUE, MAKING USE OF HIGH THROUGHPUT TECHNOLOGIES AS DESCRIBED BY CMS-2020-01-R: HCPCS | Performed by: INTERNAL MEDICINE

## 2021-07-19 PROCEDURE — 71046 X-RAY EXAM CHEST 2 VIEWS: CPT | Mod: GC | Performed by: RADIOLOGY

## 2021-07-19 PROCEDURE — 83735 ASSAY OF MAGNESIUM: CPT | Performed by: PATHOLOGY

## 2021-07-19 PROCEDURE — 80053 COMPREHEN METABOLIC PANEL: CPT | Performed by: PATHOLOGY

## 2021-07-19 PROCEDURE — 80061 LIPID PANEL: CPT | Performed by: PATHOLOGY

## 2021-07-19 PROCEDURE — 83036 HEMOGLOBIN GLYCOSYLATED A1C: CPT | Performed by: PATHOLOGY

## 2021-07-19 PROCEDURE — G0463 HOSPITAL OUTPT CLINIC VISIT: HCPCS | Mod: 25

## 2021-07-19 PROCEDURE — 93306 TTE W/DOPPLER COMPLETE: CPT | Performed by: INTERNAL MEDICINE

## 2021-07-19 PROCEDURE — 94621 CARDIOPULM EXERCISE TESTING: CPT

## 2021-07-19 PROCEDURE — 80197 ASSAY OF TACROLIMUS: CPT | Performed by: INTERNAL MEDICINE

## 2021-07-19 PROCEDURE — G0103 PSA SCREENING: HCPCS | Performed by: PATHOLOGY

## 2021-07-19 PROCEDURE — 99215 OFFICE O/P EST HI 40 MIN: CPT | Mod: 25 | Performed by: INTERNAL MEDICINE

## 2021-07-19 PROCEDURE — 73521 X-RAY EXAM HIPS BI 2 VIEWS: CPT | Performed by: RADIOLOGY

## 2021-07-19 PROCEDURE — 77080 DXA BONE DENSITY AXIAL: CPT | Performed by: INTERNAL MEDICINE

## 2021-07-19 PROCEDURE — 72070 X-RAY EXAM THORAC SPINE 2VWS: CPT | Performed by: STUDENT IN AN ORGANIZED HEALTH CARE EDUCATION/TRAINING PROGRAM

## 2021-07-19 PROCEDURE — 94621 CARDIOPULM EXERCISE TESTING: CPT | Mod: 26 | Performed by: INTERNAL MEDICINE

## 2021-07-19 PROCEDURE — 84100 ASSAY OF PHOSPHORUS: CPT | Performed by: PATHOLOGY

## 2021-07-19 PROCEDURE — 72100 X-RAY EXAM L-S SPINE 2/3 VWS: CPT | Performed by: RADIOLOGY

## 2021-07-19 PROCEDURE — 36415 COLL VENOUS BLD VENIPUNCTURE: CPT | Performed by: PATHOLOGY

## 2021-07-19 PROCEDURE — 87799 DETECT AGENT NOS DNA QUANT: CPT | Performed by: PATHOLOGY

## 2021-07-19 ASSESSMENT — MIFFLIN-ST. JEOR: SCORE: 1568.09

## 2021-07-19 ASSESSMENT — PAIN SCALES - GENERAL: PAINLEVEL: NO PAIN (0)

## 2021-07-19 NOTE — TELEPHONE ENCOUNTER
Pt did not have labs drawn this AM prior to appointments.  Writer called pt who is currently at the [facility] [place] [place].  The lab was running very behind, and pt had to go to his next appointment.  He will go have labs drawn now.

## 2021-07-19 NOTE — PROGRESS NOTES
[facility]ULT [facility]ANS[facility]    HPI:   65yr old male with a history of CAD (s/p 3v CABG 2008), ICM s/p OHT 7/19/20, DMII, CKD, and HTN who presents to clinic for routine follow up.    His post-operative course was c/b primary graft dysfunction (LVEF 20-25% and severe RV dysfunction, thought to be secondary to prolonged ischemic time, resolved by f/up TTE 7/27), VT, pericardial effusion (incidentally found per TTE 7/27, decreased size noted on TTE 7/29), donor streptococcus salivarius bacteremia (s/p 7d course of ceftriaxone), and RUE DVT c/b HIT (s/p PLEX).  He ultimately discharged 8/3. His biopsies have remained negative for rejection.  He has no DSAs.  Last AlloMap score 12/2020 was 32.  Baseline coronary angiogram has been deferred.  Last TTE 10/2020 showed normal graft function, with LVEF 55-60% and normal RV size/function.  Last RHC 12/2020 showed normal biventricular filling pressures, with RA 3, mPA 18, PCW 8, and CI 3.08.    Since his last visit, he has been feeling well. Continues to have vision problems in both eyes for which he is seeing ophthalmology and is receiving avastin injections following diagnosis of vitreous hemorrhage. He also had retinal detachment of the left eye and underwent PPV/MP/retinectomy in 12/2020.     Otherwise he denies fever, chills, nights sweats, sore throat, rhinorrhea, cough, chest pain, shortness of breath, palpitations, abdominal pain, nausea, vomiting, diarrhea, constipation, melena, hematochezia, dysuria, hematuria, PND, orthopnea, presyncope, syncope, focal tingling or weakness.       Patient Active Problem List    Diagnosis Date Noted     Cardiogenic shock (H) 02/04/2021     Priority: Medium     Immunodeficiency, unspecified (H) 02/02/2021     Priority: Medium     CKD (chronic kidney disease) stage 4, GFR 15-29 ml/min (H) 02/02/2021     Priority: Medium     HIT (heparin-induced thrombocytopenia) (H) 02/02/2021     Priority: Medium     Traction detachment of left retina  12/14/2020     Priority: Medium     Added automatically from request for surgery [id number]       Pseudomonas infection 10/05/2020     Priority: Medium     Need for prophylactic antibiotic 10/05/2020     Priority: Medium     Trnia-Barr virus viremia 10/05/2020     Priority: Medium     Sepsis (H) 09/22/2020     Priority: Medium     Hyperglycemia 08/24/2020     Priority: Medium     Heart transplant, orthotopic, status (H) 07/20/2020     Priority: Medium     CHF (congestive heart failure) (H) 07/03/2020     Priority: Medium     Acute on chronic systolic congestive heart failure (H) 07/03/2020     Priority: Medium     Added automatically from request for surgery [id number]       Heart failure (H) 07/03/2020     Priority: Medium     Added automatically from request for surgery [id number]       Dental caries 07/03/2020     Priority: Medium     Added automatically from request for surgery [id number]       Heart replaced by transplant (H) 07/03/2020     Priority: Medium     Added automatically from request for surgery [id number]       Status post coronary angiogram 07/02/2020     Priority: Medium     Coronary artery disease involving native coronary artery of native heart without angina pectoris 06/18/2020     Priority: Medium     Added automatically from request for surgery [id number]       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, unspecified whether long term insulin use (H) 11/27/2019     Priority: Medium     Added automatically from request for surgery [id number]       Nuclear cataract, nonsenile 11/27/2019     Priority: Medium     Added automatically from request for surgery [id number]       Heart transplant candidate 07/18/2019     Priority: Medium     Systolic heart failure (H) 05/14/2019     Priority: Medium     Added automatically from request for surgery [id number]       CHANTEL (obstructive sleep apnea)- severe (AHI 30) 10/06/2016     Priority: Medium     Study Date: 10/03/2016- (196.0 lbs)  apnea/hypopnea index 30.8. REM AHI 40,  supine AHI n/a. RDI  33.1 . Lowest oxygen saturation 82.8%.  Time spent less than or equal to 88% 4.9 minutes.  Time spent less than or equal to 89% 7.3 minutes. CPAP final  pressure 7 cmH2O with AHI of 0.8.  Time in REM supine on final pressure 0 minutes. No consolidated sleep seen in supine position. During diagnostic portion of study, PLM index 18.2. During treatment portion of study,  PLM index 19.3.             Moderate nonproliferative diabetic retinopathy, without macular edema, associated with type 2 diabetes mellitus 08/16/2016     Priority: Medium     Cortical cataract of both eyes 08/15/2016     Priority: Medium     Secondary renal hyperparathyroidism (H) 07/26/2016     Priority: Medium     Proteinuria 03/18/2016     Priority: Medium     Vitamin D deficiency 03/18/2016     Priority: Medium     OA (osteoarthritis) of knee 03/18/2016     Priority: Medium     Chondromalacia of patella, unspecified laterality 03/18/2016     Priority: Medium     Pain in joint of left shoulder 03/18/2016     Priority: Medium     Tinnitus 03/18/2016     Priority: Medium     left        Ptosis of right eyelid 03/18/2016     Priority: Medium     Chronic systolic heart failure (H)      Priority: Medium     Echo 7/2015 LVEF 18%       Automatic implantable cardioverter-defibrillator - [employer] single lead ICD, Not Dependent 08/18/2015     Priority: Medium     Health Care Home 01/19/2015     Priority: Medium     *See Letters for [facility] Care Plan: Emergency Care Plan         CKD (chronic kidney disease) stage 3, GFR 30-59 ml/min 01/28/2013     Priority: Medium     CHF (NYHA class II, ACC/AHA stage C) (H) 08/15/2012     Priority: Medium     Hypertension goal BP (blood pressure) < 140/90 01/21/2011     Priority: Medium     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) 10/31/2010     Priority: Medium     Goal <8%       Hyperlipidemia LDL goal <100 10/31/2010     Priority: Medium      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 (H) 03/15/2010     Priority: Medium     MI and open heart with 1 stent placed in 2008; another minor MI in 2009.  MI on 2/19/15. Coronary angiogram from [facility] on 2/19/15 showed severe 3 vessel native CAD (all three vessels [LAD, LCx and RCA] reported to be 100% ocludded at their ostia). All his grafts were considered to be occluded except for LIMA-to-LAD, which was patent and supplied left-to-left, as well as, left-to-right collaterals. There were no targets suitable for revascularization and patient was put on medical therapy.        Morbid obesity (H) 09/26/2016     Priority: Low       PAST MEDICAL HISTORY:  Past Medical History:   Diagnosis Date     CAD (coronary artery disease)      CHF (congestive heart failure) (H)      CKD (chronic kidney disease), stage III      Cortical cataract of both eyes      Diabetes (H)      Hyperlipidemia      Hypertension      Infection due to Streptococcus mitis group 09/23/2020     Ischemic cardiomyopathy      Obesity      CHANTEL (obstructive sleep apnea)     occas cpap     Osteoarthritis        CURRENT MEDICATIONS:  Prescription Medications as of 7/18/2021       Rx Number Disp Refills Start End Last Dispensed Date Next Fill Date Owning Pharmacy    acetaminophen (TYLENOL) 325 MG tablet  60 tablet 3 9/2/2020    [facility] - [place], [place] - [address]    Sig: Take 3 tablets (975 mg) by mouth every 8 hours as needed for mild pain    Class: E-Prescribe    Route: Oral    Alcohol Swabs PADS  100 each 0 8/3/2020    [facility] - [address]    Sig: Use to swab the area of the injection or sergio as directed   Per insurance coverage    Class: Local Print    aspirin (ASA) 81 MG chewable tablet  120 tablet 0 10/6/2020    [facility] - [address]    Sig: Take 1 tablet (81 mg) by mouth daily    Class: E-Prescribe    Route: Oral    atropine 1 % ophthalmic solution            Sig: Place 1 drop Into the left eye daily    Class: Historical    Route: Left Eye    blood glucose (NO BRAND SPECIFIED) test strip  400 strip 3 2021    [id number] - [place], [place] - [address] AT [address]    Sig: Use to test blood sugar 4 times daily or as directed.    Class: E-Prescribe    blood glucose monitoring (ACCU-CHEK FELIPE SMARTVIEW) meter device kit  1 kit 0 2017    [facility] [place] [address] [place] [address]    Sig: Use to test blood sugar 3-4 times daily, as directed.    Class: E-Prescribe    blood glucose monitoring (SOFTCLIX) lancets  400 each 11 2021    [id number] [address] AT [address]    Si each 4 times daily Use to test blood sugars 2 times daily.    Class: E-Prescribe    Route: Does not apply    calcium carbonate 600 mg-vitamin D 400 units (CALTRATE) 600-400 MG-UNIT per tablet  180 tablet 3 2020    [facility] - [address]    Sig: Take 1 tablet by mouth 2 times daily (with meals)    Class: E-Prescribe    Route: Oral    Continuous Blood Gluc Sensor (FREESTYLE JEREMY 14 DAY SENSOR) [facility]  2 each 11 2020    [facility]    Sig: Change every 14 days.    Class: Local Print    Continuous Blood Gluc Sensor (FREESTYLE JEREMY 14 DAY SENSOR) [facility]  6 each 4 2020    [id number] - [facility] [address] AT [address]    Sig: Change every 14 days.    Class: E-Prescribe    Notes to Pharmacy: Use to check BG 5 times daily.    furosemide (LASIX) 20 MG tablet  90 tablet 3 10/12/2020    [facility] - [address]    Sig: Take 1 tablet (20 mg) by mouth daily    Class: E-Prescribe    Route: Oral    HUMALOG KWIKPEN 100  UNIT/ML soln  90 mL 3 5/29/2021    [facility] #[id number] - [facility] [address] AT [facility]    Sig: Inject 0-31 Units Subcutaneous 3 times daily (with meals) + Custom MEAL and SNACK corrective dosing   Approx 90 units daily max    Class: E-Prescribe    hydrALAZINE (APRESOLINE) 100 MG tablet  270 tablet 3 11/13/2020    [facility] - [place] [address]    Sig: Take 1 tablet (100 mg) by mouth every 8 hours    Class: E-Prescribe    Route: Oral    insulin aspart (NOVOLOG PEN) 100 UNIT/ML pen  6 mL 0 10/5/2020    [address]    Sig: Inject 0-31 Units Subcutaneous 3 times daily (with meals) Custom MEAL and SNACK corrective dosing     BG less than 80, do not give Novolog.  BG , give  15 units.  -169, give  17 units.  -199 give 19 units.  -229 give 21 units.  -259 give 23 units.  -289 give 25 units.  -319 give 27 units.  -349 give 29 units.  BG >/= 350 give 31 units.  To be given with meal based on pre-meal blood glucose    Class: Local Print    Route: Subcutaneous    insulin isophane human (HUMULIN N PEN) 100 UNIT/ML injection  30 mL 11 3/2/2021    [facility] #[id number] - [facility] [address] AT [facility]    Sig: Inject 35 Units Subcutaneous every morning    Class: E-Prescribe    Route: Subcutaneous    insulin isophane human (HUMULIN N PEN) 100 UNIT/ML injection  3 mL 1 10/5/2020    [facility] [address]    Sig: Inject 8 Units Subcutaneous At Bedtime    Class: E-Prescribe    Route: Subcutaneous    insulin pen needle (32G X 4 MM) 32G X 4 MM miscellaneous  100 each 0 8/3/2020    [address]    Sig: Use as directed by provider  Per insurance coverage    Class: Local Print    insulin pen needle (B-D U/F) 31G X 5 MM  miscellaneous  100 each 3 2020    [facility] - [place] [address]    Si Units by Device route 2 times daily Use 2 pen needles daily or as directed.    Class: E-Prescribe    Route: Device    insulin pen needle (NOVOFINE) 30G X 8 MM miscellaneous  200 each 3 2020    [facility] #[id number] - [place], [place] - [address] AT [facility]    Sig: Inject 1 Box Subcutaneous 6 times daily Use 6 pen needles daily or as directed.    Class: E-Prescribe    Notes to Pharmacy: Has both Novolog Flex pen and several Humalog Kwik pen, but doesn't have correct needle.    Route: Subcutaneous    latanoprost (XALATAN) 0.005 % ophthalmic solution  15 mL 4 2021    [facility] #[id number] - [place], [place] - [address] AT [facility]    Sig: INSTILL 1 DROP IN BOTH EYES AT BEDTIME    Class: E-Prescribe    Notes to Pharmacy: **Patient requests 90 days supply**    magnesium oxide 400 MG CAPS  180 capsule 3 2020    [facility] - [place] [address]    Sig: Take 400 mg by mouth 2 times daily    Class: E-Prescribe    Route: Oral    mycophenolate (GENERIC EQUIVALENT) 250 MG capsule  240 capsule 11 2020    [facility] - [place] [address]    Sig: Take 4 capsules (1,000 mg) by mouth 2 times daily    Class: E-Prescribe    Notes to Pharmacy: TXP DT 2020 (Heart) TXP Dischg DT  DX Heart replaced by transplant Z94.1 TX Center [facility] ([place], [place])    Route: Oral    neomycin-polymixin-dexamethasone (MAXITROL) 0.1 % ophthalmic suspension  5 mL 0 2020    [facility] [place], [place] - [address]    Sig: Apply 1 drop to eye 4 times daily Instill into operative eye(s) per physician instructions.    Class: E-Prescribe    Route: Ophthalmic    neomycin-polymixin-dexamethasone (MAXITROL) ophthalmic ointment             Sig: Place 1 Application Into the left eye At Bedtime    Class: Historical    Route: Left Eye    order for DME  1 Units 0 1/11/2017        Sig: Auto CPAP 8-15 cmH20    Class: Sleep Center    pantoprazole (PROTONIX) 40 MG EC tablet    11/6/2020    [facility] - [place] [address]    Class: Historical    prednisoLONE acetate (PRED FORTE) 1 % ophthalmic suspension  1 Bottle 1 1/14/2021    [facility][id number] - [place] [address] AT [facility] [address]    Sig: Place 1-2 drops Into the left eye 2 times daily    Class: E-Prescribe    Notes to Pharmacy: use 1 drop left eye 2 x daily x 1 week, then 1 drop 1 x daily x 1 week, then stop    Route: Left Eye    rosuvastatin (CRESTOR) 10 MG tablet  90 tablet 2 6/7/2021    [facility] - [place] [address]    Sig: TAKE ONE TABLET BY MOUTH ONCE DAILY    Class: E-Prescribe    senna-docusate (SENOKOT-S/PERICOLACE) 8.6-50 MG tablet  60 tablet 0 10/5/2020    [facility] - [place], [place] - [address] [facility] [address]    Sig: Take 1 tablet by mouth 2 times daily (hold for loose stools)    Class: E-Prescribe    Route: Oral    sulfamethoxazole-trimethoprim (BACTRIM) 400-80 MG tablet  90 tablet 3 8/18/2020    [facility] - [place] [address]    Sig: Take 1 tablet by mouth daily    Class: E-Prescribe    Route: Oral    tacrolimus (GENERIC EQUIVALENT) 1 MG capsule  300 capsule 11 1/7/2021    [facility] [place] [address]    Sig: Take 5 capsules (5 mg) by mouth every morning AND 5 capsules (5 mg) every evening.    Class: E-Prescribe    Notes to Pharmacy: TXP DT 7/19/2020 (Heart), 7/19/2020 (Heart) TXP Dischg DT 8/3/2020 DX Heart transplant Z94.1 TX Center [facility] ([place], [place])    Route: Oral    tamsulosin (FLOMAX) 0.4 MG capsule  60 capsule 0 10/6/2020    [facility] [place], [place] - [address] [place] [address]    Sig: Take 1 capsule (0.4 mg) by mouth daily    Class: E-Prescribe    Route: Oral      Clinic-Administered Medications as of 7/18/2021       Dose Frequency Start End    bevacizumab (AVASTIN) intravitreal inj 1.25 mg 1.25 mg EVERY 28 DAYS 12/10/2020 11/11/2021    Admin Instructions: Prn every 3-52 weeks<BR>Eunice Webb, COT <BR>[phone]<BR>re    Route: Intravitreal          ROS:   Negative unless stated in HP    Exam:  Vital signs:      BP: (!) 144/81 Pulse: 60     SpO2: 95 %     Height: 165.8 cm (5' 5.28\") Weight: 86.2 kg (190 lb)  Estimated body mass index is 31.35 kg/m  as calculated from the following:    Height as of this encounter: 1.658 m (5' 5.28\").    Weight as of this encounter: 86.2 kg (190 lb).    In general, the patient is a pleasant male in no apparent distress.    HEENT: NC/AT. PERRLA. EOMI.  Sclerae white, not injected.    Neck:  No adenopathy, No thyromegaly.    COR: No jugular venous distention.  RRR.  Normal S1 S2 splits physiologically.  No murmur, rub click, or gallop.  JVP 5cmH2O  Lungs:  CTA. No rhonchi.    Abdomen: soft, nontender, nondistended.  No organomegaly.  Extremities:  No clubbing, cyanosis; +1 pitting edema in the lower legs bilaterally    Neuro: Alert & Oriented x 3, grossly non focal.    Labs:  CBC RESULTS:   Lab Results   Component Value Date    WBC 5.1 05/11/2021    RBC 4.47 05/11/2021    HGB 13.4 05/11/2021    HCT 41.5 05/11/2021    MCV 93 05/11/2021    MCH 30.0 05/11/2021    MCHC 32.3 05/11/2021    RDW 13.2 05/11/2021     05/11/2021       BMP RESULTS:  Lab Results   Component Value Date     05/11/2021    POTASSIUM 4.0 05/11/2021    CHLORIDE 107 05/11/2021    CO2 27 05/11/2021    ANIONGAP 6 05/11/2021    GLC 98 05/11/2021    BUN 34 (H) 05/11/2021    CR 1.84 (H) 05/11/2021    GFRESTIMATED 37 (L) 05/11/2021    GFRESTBLACK 43 (L) 05/11/2021    BRYANNA 9.0 05/11/2021      LIPID RESULTS:  Lab Results "   Component Value Date    CHOL 133 01/05/2021    HDL 40 01/05/2021    LDL 58 01/05/2021    TRIG 179 (H) 01/05/2021    CHOLHDLRATIO 2.6 06/27/2015    NHDL 94 01/05/2021       IMMUNOSUPPRESSANT LEVELS  Lab Results   Component Value Date    TACROL 5.6 05/11/2021    DOSTAC  7pm 5/10/21 05/11/2021       No components found for: CK  Lab Results   Component Value Date    MAG 1.8 05/11/2021     Lab Results   Component Value Date    A1C 6.7 (H) 01/14/2021     Lab Results   Component Value Date    PHOS 3.2 05/11/2021     Lab Results   Component Value Date    NTBNPI 8,792 (H) 09/22/2020     Lab Results   Component Value Date    SAITESTMET [facility] 01/05/2021    SAICELL Class I 01/05/2021    [id number] None 01/05/2021    [id number] None 01/05/2021    SAIREPCOM  01/05/2021      Test performed by modified procedure. Serum heat inactivated and tested   by a modified ([place]) protocol including fetal calf serum addition.   High-risk, mfi >3,000. Mod-risk, mfi 500-3,000.       Lab Results   Component Value Date    SAIITESTME [facility] 01/05/2021    SAIICELL Class II 01/05/2021    [id number] None 01/05/2021    [id number] None 01/05/2021    SAIIREPCOM  01/05/2021      Test performed by modified procedure. Serum heat inactivated and tested   by a modified ([place]) protocol including fetal calf serum addition.   High-risk, mfi >3,000. Mod-risk, mfi 500-3,000.       Lab Results   Component Value Date    CSPEC EDTA PLASMA 05/11/2021       Diagnostic Studies:  Echocardiogram 05/11/21  Interpretation Summary  Global and regional left ventricular function is normal with an EF of 60-65%.  Global right ventricular function is normal.  No significant valvular dysfunction present.  The inferior vena cava was normal in size with preserved respiratory  variability.  No pericardial effusion is present.    RHC and biopsy 01/07/21:  RA 2  RV 27/2  PA 26/12/16  PCWP 10  FickCI/CO 2.87/5.43  TdCI/CO 2.88/5.47  PVR 1.1  HEART, ALLOGRAFT, ENDOMYOCARDIAL BIOPSY:    - ISHLT 2004 cellular grade: 0R        - No histological evidence of acute cellular rejection   - ISHLT 2013 antibody-mediated grade: pAMR 0        - No histological features diagnostic of antibody-mediated rejection        - No detectable capillary staining for C4d or C3d     Assessment and Plan:  Mr. Lucian Henderson is a 65yr old male with a history of CAD (s/p 3v CABG 2008), ICM s/p OHT 7/19/20, DMII, CKD, and HTN who presents to clinic for routine follow up.     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  He ultimately discharged 8/3.     He has been readmitted for hyperglycemia (8/2020) and wound infection (s/p I&D 9/23/20).  He has been following with the eye clinic, as noted below.       His biopsies have remained negative for rejection.  He has no DSAs.  Last AlloMap score 12/2020 was 32.  Baseline coronary angiogram has been deferred.  Last TTE 10/2020 showed normal graft function, with LVEF 55-60% and normal RV size/function.  Last RHC 12/2020 showed normal biventricular filling pressures, with RA 3, mPA 18, PCW 8, and CI 3.08. Biopsy and RHC pending for 07/20/21.     Labs today show stable electrolytes, renal function, liver function, and blood counts.  FLP shows elevated triglycerides.  CMV, EBV, DSAs, tacro levels are in process.  He will have an echo and RHC/bx following this appt.     Mr. Tee Henderson appears well today.  His weight remains stable, and he appears euvolemic.  Will eval his RHC numbers today, and determine if we can stop his lasix.     His BPs remain stable. He is to do hydralazine 100mg PO TID.      As his triglycerides are  "elevated, discussed increasing aerobic activity and heart-healthy dieting.  Will repeat a FLP with his annual surveillance, and will determine if additional treatment is necessary at that time.        Serostatus:  - CMV D+/R+  - EBV D+/R+  - Toxo D+/R-     Immunosuppression:  - MMF 1000mg twice daily  - Tacro, goal level to 6-8 6m post transplant.  Level today in process; biopsy pending, depending on results might switch to everolimus or sirolimus due to his fluctuating creatinine     PPx:  - CAV:  Aspirin 81mg daily and rosuvastatin 10mg daily  - GI:  Pantoprazole 40mg daily  - Osteoporosis:  Calcium/vitamin D supplements  - Toxo:  Single strength bactrim, lifelong ppx     Graft function:  - BPs:  Stable, continue hydral 100mg TID  - fluid status:  Euvolemic, taking lasix 20mg daily.  Will re-eval lasix pending RHC results today.     Preventative care: dental every 6 months--DUE, vision every 1-2 years--scheduled 7/22/21; dermatology yearly at minimum--DUE, colonoscopy every 5 years--DUE 2022, flu shot every fall--DUE, Shingrix shingles vaccine--DUE, pneumonia vaccine--PCV 13, Pneumoccoccal 23.         DMII  Follows with endo and PCP.     HIT  HIT antibody + 7/2020.  CORRINE +.  No heparin products.     RIJ and axillary vein DVT, nonocclusive  Right cephalic vein occlusive thrombus  S/p course of anticoagulation.     Total retinal detachment of the left eye, s/p retinectomy 12/21/20  Proliferative diabetic retinopathy  Right vitreous hemorrhage  He presented to the ED 12/9 with right vision loss.  Ophthalmology was consulted, and found that he had a vitreous hemorrhage in his right eye and total retinal detachment of his left eye.  He was then seen in the eye clinic 12/10, where he was advised an Avastin injection in the right eye (for proliferative diabetic retinopathy and vitreous hemorrhage).  He then underwent \"27 gauge pars plana vitectomy, membrane peel, perfluoroctane liquid (PFO), retinectomy, endolaser\" 12/21 " "for the tractional retinal detachment of his left eye.  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    Change in immunosuppression: no; pending results for possible transition to everolimus or sirolimus  Reason for Change: N/A  Other Changes: none  Follow-Up: 6 months    Next Biopsy: possibly in one month if changes to immunosuppression  Next Angiogram: next year  Next Angiogram with IVUS: yes  Next Stress Test: NA     Patient evaluated and discussed with Dr. Sheridan.    Zenon Aceves, [facility]  Cardiology Fellow    I examined the patient and agree with the assessment and plan of Dr. Emanuel Aceves.    Worsening serum creatinine. Will consider switching him from FK/MMF to FK/Rapa to reduce nephrotoxic effect of FK      Total time today was 45 minutes reviewing notes, imaging, labs, patient visit, orders and documentation         Arvin Sheridan MD   [facility]  Heart Failure, Transplant, and Mechanical Circulatory Support Cardiology   Cardiovascular Division  [facility]   [phone]      CC  Patient Care Team:  No Ref-Primary, Physician as PCP - Diane Groves APRN CNP as Nurse Practitioner (Cardiology)  Arvin Sheridan MD as MD (Cardiology)  Yue Dowd MD as MD (INTERNAL MEDICINE - ENDOCRINOLOGY, DIABETES & METABOLISM)  Christelle Pettit, RN as Transplant Coordinator (Cardiology)  Negrito Rai [facility] as Pharmacist (Pharmacist)  [facility] ([facility] ([facility] ([place]  Triny Bunch MD as Assigned Surgical Provider  Anna Archuleta PA-C as Assigned PCP  Marisabel Prieto PA-C as Assigned Endocrinology Provider  Arlin Guajardo NP as Assigned Heart and Vascular Provider  ARVIN SHERIDAN    "

## 2021-07-19 NOTE — LETTER
7/19/2021      RE: Lucian VILA Isabela Sr.  [address] [facility] [address]       Dear Colleague,    Thank you for the opportunity to participate in the care of your patient, Lucian Mar Merly, at the [facility] [place] [facility]. Please see a copy of my visit note below.    ADULT HEART TRANSPLANT CLINIC    HPI:   65yr old male with a history of CAD (s/p 3v CABG 2008), ICM s/p OHT 7/19/20, DMII, CKD, and HTN who presents to clinic for routine follow up.    His post-operative course was c/b primary graft dysfunction (LVEF 20-25% and severe RV dysfunction, thought to be secondary to prolonged ischemic time, resolved by f/up TTE 7/27), VT, pericardial effusion (incidentally found per TTE 7/27, decreased size noted on TTE 7/29), donor streptococcus salivarius bacteremia (s/p 7d course of ceftriaxone), and RUE DVT c/b HIT (s/p PLEX).  He ultimately discharged 8/3. His biopsies have remained negative for rejection.  He has no DSAs.  Last AlloMap score 12/2020 was 32.  Baseline coronary angiogram has been deferred.  Last TTE 10/2020 showed normal graft function, with LVEF 55-60% and normal RV size/function.  Last RHC 12/2020 showed normal biventricular filling pressures, with RA 3, mPA 18, PCW 8, and CI 3.08.    Since his last visit, he has been feeling well. Continues to have vision problems in both eyes for which he is seeing ophthalmology and is receiving avastin injections following diagnosis of vitreous hemorrhage. He also had retinal detachment of the left eye and underwent PPV/MP/retinectomy in 12/2020.     Otherwise he denies fever, chills, nights sweats, sore throat, rhinorrhea, cough, chest pain, shortness of breath, palpitations, abdominal pain, nausea, vomiting, diarrhea, constipation, melena, hematochezia, dysuria, hematuria, PND, orthopnea, presyncope, syncope, focal tingling or weakness.       Patient Active Problem  List    Diagnosis Date Noted     Cardiogenic shock (H) 02/04/2021     Priority: Medium     Immunodeficiency, unspecified (H) 02/02/2021     Priority: Medium     CKD (chronic kidney disease) stage 4, GFR 15-29 ml/min (H) 02/02/2021     Priority: Medium     HIT (heparin-induced thrombocytopenia) (H) 02/02/2021     Priority: Medium     Traction detachment of left retina 12/14/2020     Priority: Medium     Added automatically from request for surgery [id number]       Pseudomonas infection 10/05/2020     Priority: Medium     Need for prophylactic antibiotic 10/05/2020     Priority: Medium     Trina-Barr virus viremia 10/05/2020     Priority: Medium     Sepsis (H) 09/22/2020     Priority: Medium     Hyperglycemia 08/24/2020     Priority: Medium     Heart transplant, orthotopic, status (H) 07/20/2020     Priority: Medium     CHF (congestive heart failure) (H) 07/03/2020     Priority: Medium     Acute on chronic systolic congestive heart failure (H) 07/03/2020     Priority: Medium     Added automatically from request for surgery [id number]       Heart failure (H) 07/03/2020     Priority: Medium     Added automatically from request for surgery [id number]       Dental caries 07/03/2020     Priority: Medium     Added automatically from request for surgery [id number]       Heart replaced by transplant (H) 07/03/2020     Priority: Medium     Added automatically from request for surgery [id number]       Status post coronary angiogram 07/02/2020     Priority: Medium     Coronary artery disease involving native coronary artery of native heart without angina pectoris 06/18/2020     Priority: Medium     Added automatically from request for surgery [id number]       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, unspecified whether long term insulin use (H) 11/27/2019     Priority: Medium     Added automatically from request for surgery [id number]       Nuclear cataract, nonsenile 11/27/2019     Priority: Medium      Added automatically from request for surgery [id number]       Heart transplant candidate 07/18/2019     Priority: Medium     Systolic heart failure (H) 05/14/2019     Priority: Medium     Added automatically from request for surgery [id number]       CHANTEL (obstructive sleep apnea)- severe (AHI 30) 10/06/2016     Priority: Medium     Study Date: 10/03/2016- (196.0 lbs) apnea/hypopnea index 30.8. REM AHI 40,  supine AHI n/a. RDI  33.1 . Lowest oxygen saturation 82.8%.  Time spent less than or equal to 88% 4.9 minutes.  Time spent less than or equal to 89% 7.3 minutes. CPAP final  pressure 7 cmH2O with AHI of 0.8.  Time in REM supine on final pressure 0 minutes. No consolidated sleep seen in supine position. During diagnostic portion of study, PLM index 18.2. During treatment portion of study,  PLM index 19.3.             Moderate nonproliferative diabetic retinopathy, without macular edema, associated with type 2 diabetes mellitus 08/16/2016     Priority: Medium     Cortical cataract of both eyes 08/15/2016     Priority: Medium     Secondary renal hyperparathyroidism (H) 07/26/2016     Priority: Medium     Proteinuria 03/18/2016     Priority: Medium     Vitamin D deficiency 03/18/2016     Priority: Medium     OA (osteoarthritis) of knee 03/18/2016     Priority: Medium     Chondromalacia of patella, unspecified laterality 03/18/2016     Priority: Medium     Pain in joint of left shoulder 03/18/2016     Priority: Medium     Tinnitus 03/18/2016     Priority: Medium     left        Ptosis of right eyelid 03/18/2016     Priority: Medium     Chronic systolic heart failure (H)      Priority: Medium     Echo 7/2015 LVEF 18%       Automatic implantable cardioverter-defibrillator - [employer] single lead ICD, Not Dependent 08/18/2015     Priority: Medium     Health Care Home 01/19/2015     Priority: Medium     *See Letters for [facility] Care Plan: Emergency Care Plan         CKD (chronic kidney disease) stage 3, GFR 30-59 ml/min  01/28/2013     Priority: Medium     CHF (NYHA class II, ACC/AHA stage C) (H) 08/15/2012     Priority: Medium     Hypertension goal BP (blood pressure) < 140/90 01/21/2011     Priority: Medium     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) 10/31/2010     Priority: Medium     Goal <8%       Hyperlipidemia LDL goal <100 10/31/2010     Priority: Medium     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 (H) 03/15/2010     Priority: Medium     MI and open heart with 1 stent placed in 2008; another minor MI in 2009.  MI on 2/19/15. Coronary angi[facility] from [facility] on 2/19/15 showed severe 3 vessel native CAD (all three vessels [LAD, LCx and RCA] reported to be 100% ocludded at their ostia). All his grafts were considered to be occluded except for LIMA-to-LAD, which was patent and supplied left-to-left, as well as, left-to-right collaterals. There were no targets suitable for revascularization and patient was put on medical therapy.        Morbid obesity (H) 09/26/2016     Priority: Low       PAST MEDICAL HISTORY:  Past Medical History:   Diagnosis Date     CAD (coronary artery disease)      CHF (congestive heart failure) (H)      CKD (chronic kidney disease), stage III      Cortical cataract of both eyes      Diabetes (H)      Hyperlipidemia      Hypertension      Infection due to Streptococcus mitis group 09/23/2020     Ischemic cardiomyopathy      Obesity      CHANTEL (obstructive sleep apnea)     occas cpap     Osteoarthritis        CURRENT MEDICATIONS:  Prescription Medications as of 7/18/2021       Rx Number Disp Refills Start End Last Dispensed Date Next Fill Date Owning Pharmacy    acetaminophen (TYLENOL) 325 MG tablet  60 tablet 3 9/2/2020    [facility] - [place], [place] - [address]    Sig: Take 3 tablets (975 mg) by mouth every 8 hours as needed for mild pain    Class: E-Prescribe    Route:  Oral    Alcohol Swabs PADS  100 each 0 8/3/2020    [facility] - [place], [place] - [address] [facility] [address]    Sig: Use to swab the area of the injection or sergio as directed   Per insurance coverage    Class: Local Print    aspirin (ASA) 81 MG chewable tablet  120 tablet 0 10/6/2020    [facility] - [place], [place] - [address]    Sig: Take 1 tablet (81 mg) by mouth daily    Class: E-Prescribe    Route: Oral    atropine 1 % ophthalmic solution            Sig: Place 1 drop Into the left eye daily    Class: Historical    Route: Left Eye    blood glucose (NO BRAND SPECIFIED) test strip  400 strip 3 2021    [facility][id number] - [place], [place] - [address] AT [address]    Sig: Use to test blood sugar 4 times daily or as directed.    Class: E-Prescribe    blood glucose monitoring (ACCU-CHEK FELIPE SMARTVIEW) meter device kit  1 kit 0 2017    [facility] [place], [place] - [address] [place] [address]    Sig: Use to test blood sugar 3-4 times daily, as directed.    Class: E-Prescribe    blood glucose monitoring (SOFTCLIX) lancets  400 each 11 2021    [id number], [place] - [address] AT [address]    Si each 4 times daily Use to test blood sugars 2 times daily.    Class: E-Prescribe    Route: Does not apply    calcium carbonate 600 mg-vitamin D 400 units (CALTRATE) 600-400 MG-UNIT per tablet  180 tablet 3 2020    [facility] - [facility] [address] [place] [address]    Sig: Take 1 tablet by mouth 2 times daily (with meals)    Class: E-Prescribe    Route: Oral    Continuous Blood Gluc Sensor (FREESTYLE JEREMY 14 DAY SENSOR) [facility]  2 each 11 2020    [facility]    Sig: Change every 14 days.    Class: Local Print    Continuous Blood Gluc Sensor (FREESTYLE JEREMY 14 DAY SENSOR) MISC  6 each 4 2020    [facility][id number] - [place], [place] - [address] [facility]  AT [address]    Sig: Change every 14 days.    Class: E-Prescribe    Notes to Pharmacy: Use to check BG 5 times daily.    furosemide (LASIX) 20 MG tablet  90 tablet 3 10/12/2020    [facility] [address]    Sig: Take 1 tablet (20 mg) by mouth daily    Class: E-Prescribe    Route: Oral    HUMALOG KWIKPEN 100 UNIT/ML soln  90 mL 3 5/29/2021    [facility] #[id number] [address] AT [address]    Sig: Inject 0-31 Units Subcutaneous 3 times daily (with meals) + Custom MEAL and SNACK corrective dosing   Approx 90 units daily max    Class: E-Prescribe    hydrALAZINE (APRESOLINE) 100 MG tablet  270 tablet 3 11/13/2020    [facility] [address]    Sig: Take 1 tablet (100 mg) by mouth every 8 hours    Class: E-Prescribe    Route: Oral    insulin aspart (NOVOLOG PEN) 100 UNIT/ML pen  6 mL 0 10/5/2020    [address]    Sig: Inject 0-31 Units Subcutaneous 3 times daily (with meals) Custom MEAL and SNACK corrective dosing     BG less than 80, do not give Novolog.  BG , give  15 units.  -169, give  17 units.  -199 give 19 units.  -229 give 21 units.  -259 give 23 units.  -289 give 25 units.  -319 give 27 units.  -349 give 29 units.  BG >/= 350 give 31 units.  To be given with meal based on pre-meal blood glucose    Class: Local Print    Route: Subcutaneous    insulin isophane human (HUMULIN N PEN) 100 UNIT/ML injection  30 mL 11 3/2/2021    [id number] [address]    Sig: Inject 35 Units Subcutaneous every morning    Class: E-Prescribe    Route: Subcutaneous    insulin isophane human (HUMULIN N PEN) 100 UNIT/ML injection  3 mL 1 10/5/2020    [address]     Sig: Inject 8 Units Subcutaneous At Bedtime    Class: E-Prescribe    Route: Subcutaneous    insulin pen needle (32G X 4 MM) 32G X 4 MM miscellaneous  100 each 0 8/3/2020    [facility] - [place], [place] - [address]    Sig: Use as directed by provider  Per insurance coverage    Class: Local Print    insulin pen needle (B-D U/F) 31G X 5 MM miscellaneous  100 each 3 2020    [facility] - [place], [place] - [address]    Si Units by Device route 2 times daily Use 2 pen needles daily or as directed.    Class: E-Prescribe    Route: Device    insulin pen needle (NOVOFINE) 30G X 8 MM miscellaneous  200 each 3 2020    [id number] [address] AT [facility]    Sig: Inject 1 Box Subcutaneous 6 times daily Use 6 pen needles daily or as directed.    Class: E-Prescribe    Notes to Pharmacy: Has both Novolog Flex pen and several Humalog Kwik pen, but doesn't have correct needle.    Route: Subcutaneous    latanoprost (XALATAN) 0.005 % ophthalmic solution  15 mL 4 2021    [facility] #[id number] - [place], [place] - [address] AT [facility]    Sig: INSTILL 1 DROP IN BOTH EYES AT BEDTIME    Class: E-Prescribe    Notes to Pharmacy: **Patient requests 90 days supply**    magnesium oxide 400 MG CAPS  180 capsule 3 2020    [facility] - [facility] [address]    Sig: Take 400 mg by mouth 2 times daily    Class: E-Prescribe    Route: Oral    mycophenolate (GENERIC EQUIVALENT) 250 MG capsule  240 capsule 11 2020    [facility] - [facility] [address]    Sig: Take 4 capsules (1,000 mg) by mouth 2 times daily    Class: E-Prescribe    Notes to Pharmacy: TXP DT 2020 (Heart) TXP Dischg DT  DX Heart replaced by transplant Z94.1 TX Center [facility] ([place], [place])    Route: Oral     neomycin-polymixin-dexamethasone (MAXITROL) 0.1 % ophthalmic suspension  5 mL 0 12/21/2020    [facility] [place], [place] - [address]    Sig: Apply 1 drop to eye 4 times daily Instill into operative eye(s) per physician instructions.    Class: E-Prescribe    Route: Ophthalmic    neomycin-polymixin-dexamethasone (MAXITROL) ophthalmic ointment            Sig: Place 1 Application Into the left eye At Bedtime    Class: Historical    Route: Left Eye    order for DME  1 Units 0 1/11/2017        Sig: Auto CPAP 8-15 cmH20    Class: Sleep Center    pantoprazole (PROTONIX) 40 MG EC tablet    11/6/2020    [facility] [place], [place] - [address]    Class: Historical    prednisoLONE acetate (PRED FORTE) 1 % ophthalmic suspension  1 Bottle 1 1/14/2021    [facility] #[id number] - [facility] [address] AT [facility] [address]    Sig: Place 1-2 drops Into the left eye 2 times daily    Class: E-Prescribe    Notes to Pharmacy: use 1 drop left eye 2 x daily x 1 week, then 1 drop 1 x daily x 1 week, then stop    Route: Left Eye    rosuvastatin (CRESTOR) 10 MG tablet  90 tablet 2 6/7/2021    [facility], [place] - [facility] [address]    Sig: TAKE ONE TABLET BY MOUTH ONCE DAILY    Class: E-Prescribe    senna-docusate (SENOKOT-S/PERICOLACE) 8.6-50 MG tablet  60 tablet 0 10/5/2020    [facility] [place], [place] - [address] [facility] [address]    Sig: Take 1 tablet by mouth 2 times daily (hold for loose stools)    Class: E-Prescribe    Route: Oral    sulfamethoxazole-trimethoprim (BACTRIM) 400-80 MG tablet  90 tablet 3 8/18/2020    [facility] [place] - [facility] [address]    Sig: Take 1 tablet by mouth daily    Class: E-Prescribe    Route: Oral    tacrolimus (GENERIC EQUIVALENT) 1 MG capsule  300 capsule 11 1/7/2021    [facility] [place] [address] [address]    Sig: Take 5  "capsules (5 mg) by mouth every morning AND 5 capsules (5 mg) every evening.    Class: E-Prescribe    Notes to Pharmacy: TXP DT 7/19/2020 (Heart), 7/19/2020 (Heart) TXP Dischg DT 8/3/2020 DX Heart transplant Z94.1 TX [facility] ([place], [place])    Route: Oral    tamsulosin (FLOMAX) 0.4 MG capsule  60 capsule 0 10/6/2020    [facility] - [place], [place] - [address] [facility]    Sig: Take 1 capsule (0.4 mg) by mouth daily    Class: E-Prescribe    Route: Oral      Clinic-Administered Medications as of 7/18/2021       Dose Frequency Start End    bevacizumab (AVASTIN) intravitreal inj 1.25 mg 1.25 mg EVERY 28 DAYS 12/10/2020 11/11/2021    Admin Instructions: Prn every 3-52 weeksMAYE Rodriguez [phone]    Route: Intravitreal          ROS:   Negative unless stated in HP    Exam:  Vital signs:      BP: (!) 144/81 Pulse: 60     SpO2: 95 %     Height: 165.8 cm (5' 5.28\") Weight: 86.2 kg (190 lb)  Estimated body mass index is 31.35 kg/m  as calculated from the following:    Height as of this encounter: 1.658 m (5' 5.28\").    Weight as of this encounter: 86.2 kg (190 lb).    In general, the patient is a pleasant male in no apparent distress.    HEENT: NC/AT. PERRLA. EOMI.  Sclerae white, not injected.    Neck:  No adenopathy, No thyromegaly.    COR: No jugular venous distention.  RRR.  Normal S1 S2 splits physiologically.  No murmur, rub click, or gallop.  JVP 5cmH2O  Lungs:  CTA. No rhonchi.    Abdomen: soft, nontender, nondistended.  No organomegaly.  Extremities:  No clubbing, cyanosis; +1 pitting edema in the lower legs bilaterally    Neuro: Alert & Oriented x 3, grossly non focal.    Labs:  CBC RESULTS:   Lab Results   Component Value Date    WBC 5.1 05/11/2021    RBC 4.47 05/11/2021    HGB 13.4 05/11/2021    HCT 41.5 05/11/2021    MCV 93 05/11/2021    MCH 30.0 05/11/2021    MCHC 32.3 05/11/2021    RDW 13.2 05/11/2021     05/11/2021       BMP RESULTS:  Lab " Results   Component Value Date     05/11/2021    POTASSIUM 4.0 05/11/2021    CHLORIDE 107 05/11/2021    CO2 27 05/11/2021    ANIONGAP 6 05/11/2021    GLC 98 05/11/2021    BUN 34 (H) 05/11/2021    CR 1.84 (H) 05/11/2021    GFRESTIMATED 37 (L) 05/11/2021    GFRESTBLACK 43 (L) 05/11/2021    BRYANNA 9.0 05/11/2021      LIPID RESULTS:  Lab Results   Component Value Date    CHOL 133 01/05/2021    HDL 40 01/05/2021    LDL 58 01/05/2021    TRIG 179 (H) 01/05/2021    CHOLHDLRATIO 2.6 06/27/2015    NHDL 94 01/05/2021       IMMUNOSUPPRESSANT LEVELS  Lab Results   Component Value Date    TACROL 5.6 05/11/2021    DOSTAC  7pm 5/10/21 05/11/2021       No components found for: CK  Lab Results   Component Value Date    MAG 1.8 05/11/2021     Lab Results   Component Value Date    A1C 6.7 (H) 01/14/2021     Lab Results   Component Value Date    PHOS 3.2 05/11/2021     Lab Results   Component Value Date    NTBNPI 8,792 (H) 09/22/2020     Lab Results   Component Value Date    SAITESTMET [facility] 01/05/2021    SAICELL Class I 01/05/2021    [id number] None 01/05/2021    [id number] None 01/05/2021    SAIREPCOM  01/05/2021      Test performed by modified procedure. Serum heat inactivated and tested   by a modified ([facility]) protocol including fetal calf serum addition.   High-risk, mfi >3,000. Mod-risk, mfi 500-3,000.       Lab Results   Component Value Date    SAIITESTME SA [facility] 01/05/2021    SAIICELL Class II 01/05/2021    [id number] None 01/05/2021    DQ6VQKCKNP None 01/05/2021    SAIIREPCOM  01/05/2021      Test performed by modified procedure. Serum heat inactivated and tested   by a modified ([facility]) protocol including fetal calf serum addition.   High-risk, mfi >3,000. Mod-risk, mfi 500-3,000.       Lab Results   Component Value Date    CSPEC EDTA PLASMA 05/11/2021       Diagnostic Studies:  Echocardiogram 05/11/21  Interpretation Summary  Global and regional left ventricular function is normal with an EF of 60-65%.  Global right  ventricular function is normal.  No significant valvular dysfunction present.  The inferior vena cava was normal in size with preserved respiratory  variability.  No pericardial effusion is present.    RHC and biopsy 01/07/21:  RA 2  RV 27/2  PA 26/12/16  PCWP 10  FickCI/CO 2.87/5.43  TdCI/CO 2.88/5.47  PVR 1.1  HEART, ALLOGRAFT, ENDOMYOCARDIAL BIOPSY:   - ISHLT 2004 cellular grade: 0R        - No histological evidence of acute cellular rejection   - ISHLT 2013 antibody-mediated grade: pAMR 0        - No histological features diagnostic of antibody-mediated rejection        - No detectable capillary staining for C4d or C3d     Assessment and Plan:  Mr. Lucian Henderson is a 65yr old male with a history of CAD (s/p 3v CABG 2008), ICM s/p OHT 7/19/20, DMII, CKD, and HTN who presents to clinic for routine follow up.     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  He ultimately discharged 8/3.     He has been readmitted for hyperglycemia (8/2020) and wound infection (s/p I&D 9/23/20).  He has been following with the eye clinic, as noted below.       His biopsies have remained negative for rejection.  He has no DSAs.  Last AlloMap score 12/2020 was 32.  Baseline coronary angiogram has been deferred.  Last TTE 10/2020 showed normal graft function, with LVEF 55-60% and normal RV size/function.  Last RHC 12/2020 showed normal biventricular filling pressures, with RA 3, mPA 18, PCW 8, and CI 3.08. Biopsy and RHC pending for 07/20/21.     Labs today show stable electrolytes, renal function, liver function, and blood counts.  FLP shows elevated triglycerides.  CMV, EBV,  DSAs, tacro levels are in process.  He will have an echo and RHC/bx following this appt.     Mr. Tee Henderson appears well today.  His weight remains stable, and he appears euvolemic.  Will eval his RHC numbers today, and determine if we can stop his lasix.     His BPs remain stable. He is to do hydralazine 100mg PO TID.      As his triglycerides are elevated, discussed increasing aerobic activity and heart-healthy dieting.  Will repeat a FLP with his annual surveillance, and will determine if additional treatment is necessary at that time.        Serostatus:  - CMV D+/R+  - EBV D+/R+  - Toxo D+/R-     Immunosuppression:  - MMF 1000mg twice daily  - Tacro, goal level to 6-8 6m post transplant.  Level today in process; biopsy pending, depending on results might switch to everolimus or sirolimus due to his fluctuating creatinine     PPx:  - CAV:  Aspirin 81mg daily and rosuvastatin 10mg daily  - GI:  Pantoprazole 40mg daily  - Osteoporosis:  Calcium/vitamin D supplements  - Toxo:  Single strength bactrim, lifelong ppx     Graft function:  - BPs:  Stable, continue hydral 100mg TID  - fluid status:  Euvolemic, taking lasix 20mg daily.  Will re-eval lasix pending RHC results today.     Preventative care: dental every 6 months--DUE, vision every 1-2 years--scheduled 7/22/21; dermatology yearly at minimum--DUE, colonoscopy every 5 years--DUE 2022, flu shot every fall--DUE, Shingrix shingles vaccine--DUE, pneumonia vaccine--PCV 13, Pneumoccoccal 23.         DMII  Follows with endo and PCP.     HIT  HIT antibody + 7/2020.  CORRINE +.  No heparin products.     RIJ and axillary vein DVT, nonocclusive  Right cephalic vein occlusive thrombus  S/p course of anticoagulation.     Total retinal detachment of the left eye, s/p retinectomy 12/21/20  Proliferative diabetic retinopathy  Right vitreous hemorrhage  He presented to the ED 12/9 with right vision loss.  Ophthalmology was consulted, and found that he had a vitreous hemorrhage  "in his right eye and total retinal detachment of his left eye.  He was then seen in the eye clinic 12/10, where he was advised an Avastin injection in the right eye (for proliferative diabetic retinopathy and vitreous hemorrhage).  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    Change in immunosuppression: no; pending results for possible transition to everolimus or sirolimus  Reason for Change: N/A  Other Changes: none  Follow-Up: 6 months    Next Biopsy: possibly in one month if changes to immunosuppression  Next Angiogram: next year  Next Angiogram with IVUS: yes  Next Stress Test: NA     Patient evaluated and discussed with Dr. Sheridan.    Zenon Aceves, [facility]  Cardiology Fellow    I examined the patient and agree with the assessment and plan of Dr. Emanuel Aceves.    Worsening serum creatinine. Will consider switching him from FK/MMF to FK/Rapa to reduce nephrotoxic effect of FK      Total time today was 45 minutes reviewing notes, imaging, labs, patient visit, orders and documentation         Arvin Sheridan MD   [facility]  Heart Failure, Transplant, and Mechanical Circulatory Support Cardiology   Cardiovascular Division  [facility]   [phone]    CC  Patient Care Team:  No Ref-Primary, Physician as PCP - General  Diane Watts APRN CNP as Nurse Practitioner (Cardiology)  Yue Dowd MD as MD (INTERNAL MEDICINE - ENDOCRINOLOGY, DIABETES & METABOLISM)  Christelle Pettit RN as Transplant Coordinator (Cardiology)  Negrito Rai [facility] as Pharmacist (Pharmacist)  [facility] ([facility]  Triny Bunch MD as Assigned Surgical " Provider  Anna Archuleta PA-C as Assigned PCP  Marisabel Prieto PA-C as Assigned Endocrinology Provider  Arlin Guajardo NP as Assigned Heart and Vascular Provider

## 2021-07-19 NOTE — NURSING NOTE
Transplant Coordinator Note    Reason for visit: 1 year heart tx annual  Coordinator: Present   Caregiver: wife present    Health concerns addressed today:  1. Elevated creatinine--make sure angiogram is biplane tomorrow. Plan to switch from tac/MMF to everolimus/MMF 1g bid with an everolimus goal of 6-8.  2. Hydralazine scheduled for TID but patient only taking BID        Immunosuppressants:  MMF 1g bid  Tacrolimus 5mg bid    Routine screenings:    Derm: Due--coordinator to schedule  Dental: due--pt reminded to have regular dental cleanings  Colonoscopy: last in 2019; due next year per colonoscopy recommendation  Breast/Prostate: PSA WNL  Eye: Eye appointment on 7/22/2021  Flu/Pneumonia: Flu and both pneumonia vaccines due    Labs:   Available labs reviewed. CMV, EBV, immuknow, allomap, DSAs pending.    Tac to be drawn tomorrow.    Additional Notes:         Instructions/plan of care/preventative care follow up  reviewed with patient and wife.  Medication record reviewed and reconciled  Questions and concerns addressed  Pt verbalized an understanding of plan of care.     Patient Instructions  Will call with pending results.    Take hydralazine 100mg three times a day, NOT twice a day. Check your blood pressure at home twice a day.     Will wait for biopsy then may change your transplant medicine tacrolimus to a medicine called everolimus.      Next transplant clinic appointment:  1 month after IMS change  Next lab draw: tomorrow for tacrolimus  Coordinator will call with all pending results.     Please call transplant coordinator with any questions:    Christelle Pettit RN BSN   Post Heart Transplant Nurse Coordinator  [facility]  Questions: [phone]          Pt taking hydralazine bid instead of tid  Biplane angio tomorrow d/t creatinine   If bx neg, switch from tac to everlimus 6-8 goal; continue mmf 1g bid; bx 1 month post IMS change

## 2021-07-19 NOTE — NURSING NOTE
Chief Complaint   Patient presents with     Follow Up     RTN HF: 64 year old male presents with systolic heart failure for follow up      Vitals were taken and medications reconciled.    Jack Hanson, EMT  4:23 PM

## 2021-07-19 NOTE — PATIENT INSTRUCTIONS
Will call with pending results.    Take hydralazine 100mg three times a day, NOT twice a day. Check your blood pressure at home twice a day.     Will wait for biopsy then may change your transplant medicine tacrolimus to a medicine called everolimus.    Preventative care: dental every 6 months--DUE, vision every 1-2 years--scheduled 7/22/21; dermatology yearly at minimum--DUE, colonoscopy every 5 years--DUE 2022, flu shot every fall--DUE, Shingrix shingles vaccine--DUE, pneumonia vaccine--PCV 13, Pneumoccoccal 23.     **Return in 1 month for a biopsy after/if we change your transplant medicine.    Call [phone] or [employer], with any questions or concerns.

## 2021-07-19 NOTE — TELEPHONE ENCOUNTER
Call complete for pre procedure reminder, travel screen and updated visitor policy.  COVID Pending

## 2021-07-20 ENCOUNTER — HOSPITAL ENCOUNTER (OUTPATIENT)
Facility: CLINIC | Age: 66
Discharge: HOME | End: 2021-07-20
Attending: INTERNAL MEDICINE | Admitting: INTERNAL MEDICINE
Payer: COMMERCIAL

## 2021-07-20 ENCOUNTER — APPOINTMENT (OUTPATIENT)
Dept: MEDSURG UNIT | Facility: CLINIC | Age: 66
End: 2021-07-20
Attending: INTERNAL MEDICINE
Payer: COMMERCIAL

## 2021-07-20 ENCOUNTER — APPOINTMENT (OUTPATIENT)
Dept: LAB | Facility: CLINIC | Age: 66
End: 2021-07-20
Attending: INTERNAL MEDICINE
Payer: COMMERCIAL

## 2021-07-20 VITALS
HEIGHT: 63 IN | TEMPERATURE: 98.2 F | HEART RATE: 81 BPM | RESPIRATION RATE: 16 BRPM | OXYGEN SATURATION: 92 % | BODY MASS INDEX: 33.13 KG/M2 | SYSTOLIC BLOOD PRESSURE: 137 MMHG | WEIGHT: 187 LBS | DIASTOLIC BLOOD PRESSURE: 74 MMHG

## 2021-07-20 DIAGNOSIS — Z94.1 HEART TRANSPLANT, ORTHOTOPIC, STATUS (H): ICD-10-CM

## 2021-07-20 LAB
CMV DNA SPEC NAA+PROBE-ACNC: NOT DETECTED IU/ML
EBV DNA # SPEC NAA+PROBE: NOT DETECTED COPIES/ML
GLUCOSE BLDC GLUCOMTR-MCNC: 150 MG/DL (ref 70–99)
HGB BLD-MCNC: 12.9 G/DL (ref 13.3–17.7)
OXYHGB MFR BLDA: 72 % (ref 92–100)

## 2021-07-20 PROCEDURE — 250N000011 HC RX IP 250 OP 636: Performed by: INTERNAL MEDICINE

## 2021-07-20 PROCEDURE — 99152 MOD SED SAME PHYS/QHP 5/>YRS: CPT | Performed by: INTERNAL MEDICINE

## 2021-07-20 PROCEDURE — 99153 MOD SED SAME PHYS/QHP EA: CPT | Performed by: INTERNAL MEDICINE

## 2021-07-20 PROCEDURE — 93505 ENDOMYOCARDIAL BIOPSY: CPT | Performed by: INTERNAL MEDICINE

## 2021-07-20 PROCEDURE — 82810 BLOOD GASES O2 SAT ONLY: CPT

## 2021-07-20 PROCEDURE — 88346 IMFLUOR 1ST 1ANTB STAIN PX: CPT | Mod: TC | Performed by: INTERNAL MEDICINE

## 2021-07-20 PROCEDURE — C1887 CATHETER, GUIDING: HCPCS | Performed by: INTERNAL MEDICINE

## 2021-07-20 PROCEDURE — 85018 HEMOGLOBIN: CPT

## 2021-07-20 PROCEDURE — 93460 R&L HRT ART/VENTRICLE ANGIO: CPT | Performed by: INTERNAL MEDICINE

## 2021-07-20 PROCEDURE — 272N000001 HC OR GENERAL SUPPLY STERILE: Performed by: INTERNAL MEDICINE

## 2021-07-20 PROCEDURE — 999N000054 HC STATISTIC EKG NON-CHARGEABLE

## 2021-07-20 PROCEDURE — 250N000009 HC RX 250: Performed by: INTERNAL MEDICINE

## 2021-07-20 PROCEDURE — 93010 ELECTROCARDIOGRAM REPORT: CPT | Performed by: INTERNAL MEDICINE

## 2021-07-20 PROCEDURE — 258N000003 HC RX IP 258 OP 636: Performed by: INTERNAL MEDICINE

## 2021-07-20 PROCEDURE — 93005 ELECTROCARDIOGRAM TRACING: CPT

## 2021-07-20 PROCEDURE — 999N000133 HC STATISTIC PP CARE STAGE 2

## 2021-07-20 PROCEDURE — 82810 BLOOD GASES O2 SAT ONLY: CPT | Performed by: INTERNAL MEDICINE

## 2021-07-20 PROCEDURE — C1894 INTRO/SHEATH, NON-LASER: HCPCS | Performed by: INTERNAL MEDICINE

## 2021-07-20 PROCEDURE — 272N000002 HC OR SUPPLY OTHER OPNP: Performed by: INTERNAL MEDICINE

## 2021-07-20 RX ORDER — NALOXONE HYDROCHLORIDE 0.4 MG/ML
0.2 INJECTION, SOLUTION INTRAMUSCULAR; INTRAVENOUS; SUBCUTANEOUS
Status: DISCONTINUED | OUTPATIENT
Start: 2021-07-20 | End: 2021-07-20 | Stop reason: HOSPADM

## 2021-07-20 RX ORDER — DOPAMINE HYDROCHLORIDE 160 MG/100ML
2-20 INJECTION, SOLUTION INTRAVENOUS CONTINUOUS PRN
Status: DISCONTINUED | OUTPATIENT
Start: 2021-07-20 | End: 2021-07-20 | Stop reason: HOSPADM

## 2021-07-20 RX ORDER — NICARDIPINE HYDROCHLORIDE 2.5 MG/ML
INJECTION INTRAVENOUS
Status: DISCONTINUED | OUTPATIENT
Start: 2021-07-20 | End: 2021-07-20 | Stop reason: HOSPADM

## 2021-07-20 RX ORDER — FENTANYL CITRATE 50 UG/ML
INJECTION, SOLUTION INTRAMUSCULAR; INTRAVENOUS
Status: DISCONTINUED | OUTPATIENT
Start: 2021-07-20 | End: 2021-07-20 | Stop reason: HOSPADM

## 2021-07-20 RX ORDER — DOBUTAMINE HYDROCHLORIDE 200 MG/100ML
2-20 INJECTION INTRAVENOUS CONTINUOUS PRN
Status: DISCONTINUED | OUTPATIENT
Start: 2021-07-20 | End: 2021-07-20 | Stop reason: HOSPADM

## 2021-07-20 RX ORDER — LIDOCAINE 40 MG/G
CREAM TOPICAL
Status: DISCONTINUED | OUTPATIENT
Start: 2021-07-20 | End: 2021-07-20 | Stop reason: HOSPADM

## 2021-07-20 RX ORDER — ARGATROBAN 1 MG/ML
150 INJECTION, SOLUTION INTRAVENOUS
Status: DISCONTINUED | OUTPATIENT
Start: 2021-07-20 | End: 2021-07-20 | Stop reason: HOSPADM

## 2021-07-20 RX ORDER — EPTIFIBATIDE 2 MG/ML
180 INJECTION, SOLUTION INTRAVENOUS EVERY 10 MIN PRN
Status: DISCONTINUED | OUTPATIENT
Start: 2021-07-20 | End: 2021-07-20 | Stop reason: HOSPADM

## 2021-07-20 RX ORDER — POTASSIUM CHLORIDE 750 MG/1
40 TABLET, EXTENDED RELEASE ORAL
Status: DISCONTINUED | OUTPATIENT
Start: 2021-07-20 | End: 2021-07-20 | Stop reason: HOSPADM

## 2021-07-20 RX ORDER — FENTANYL CITRATE 50 UG/ML
25 INJECTION, SOLUTION INTRAMUSCULAR; INTRAVENOUS
Status: DISCONTINUED | OUTPATIENT
Start: 2021-07-20 | End: 2021-07-20 | Stop reason: HOSPADM

## 2021-07-20 RX ORDER — NALOXONE HYDROCHLORIDE 0.4 MG/ML
0.4 INJECTION, SOLUTION INTRAMUSCULAR; INTRAVENOUS; SUBCUTANEOUS
Status: DISCONTINUED | OUTPATIENT
Start: 2021-07-20 | End: 2021-07-20 | Stop reason: HOSPADM

## 2021-07-20 RX ORDER — SODIUM CHLORIDE 9 MG/ML
INJECTION, SOLUTION INTRAVENOUS CONTINUOUS
Status: DISCONTINUED | OUTPATIENT
Start: 2021-07-20 | End: 2021-07-20 | Stop reason: HOSPADM

## 2021-07-20 RX ORDER — POTASSIUM CHLORIDE 750 MG/1
20 TABLET, EXTENDED RELEASE ORAL
Status: DISCONTINUED | OUTPATIENT
Start: 2021-07-20 | End: 2021-07-20 | Stop reason: HOSPADM

## 2021-07-20 RX ORDER — IOPAMIDOL 755 MG/ML
INJECTION, SOLUTION INTRAVASCULAR
Status: DISCONTINUED | OUTPATIENT
Start: 2021-07-20 | End: 2021-07-20 | Stop reason: HOSPADM

## 2021-07-20 RX ORDER — NITROGLYCERIN 20 MG/100ML
10-200 INJECTION INTRAVENOUS CONTINUOUS PRN
Status: DISCONTINUED | OUTPATIENT
Start: 2021-07-20 | End: 2021-07-20 | Stop reason: HOSPADM

## 2021-07-20 RX ORDER — ATROPINE SULFATE 0.1 MG/ML
0.5 INJECTION INTRAVENOUS
Status: DISCONTINUED | OUTPATIENT
Start: 2021-07-20 | End: 2021-07-20 | Stop reason: HOSPADM

## 2021-07-20 RX ORDER — ARGATROBAN 1 MG/ML
350 INJECTION, SOLUTION INTRAVENOUS
Status: DISCONTINUED | OUTPATIENT
Start: 2021-07-20 | End: 2021-07-20 | Stop reason: HOSPADM

## 2021-07-20 RX ORDER — NITROGLYCERIN 5 MG/ML
VIAL (ML) INTRAVENOUS
Status: DISCONTINUED | OUTPATIENT
Start: 2021-07-20 | End: 2021-07-20 | Stop reason: HOSPADM

## 2021-07-20 RX ORDER — FLUMAZENIL 0.1 MG/ML
0.2 INJECTION, SOLUTION INTRAVENOUS
Status: DISCONTINUED | OUTPATIENT
Start: 2021-07-20 | End: 2021-07-20 | Stop reason: HOSPADM

## 2021-07-20 RX ORDER — EPTIFIBATIDE 2 MG/ML
1 INJECTION, SOLUTION INTRAVENOUS CONTINUOUS PRN
Status: DISCONTINUED | OUTPATIENT
Start: 2021-07-20 | End: 2021-07-20 | Stop reason: HOSPADM

## 2021-07-20 RX ADMIN — SODIUM CHLORIDE: 9 INJECTION, SOLUTION INTRAVENOUS at 09:29

## 2021-07-20 ASSESSMENT — MIFFLIN-ST. JEOR: SCORE: 1518.36

## 2021-07-20 NOTE — PROGRESS NOTES
Prep and teaching complete for cors/angiogram, right heart cath and biopsy; pt awake and alert, denies pain. Has ride post procedure, wife Shivani at bedside.  electronically available with all teaching and cares.  Consent current; Labs current.

## 2021-07-20 NOTE — Clinical Note
Hemodynamic equipment used: 5 lead ECG, Calometric ETCO2 device, Machine BP Cuff and pulse oximeter probe.

## 2021-07-20 NOTE — PROGRESS NOTES
D/I/A:  Patient is tolerating liquids and foods, ambulating, urinating, puncture sites are stable ( no bleeding and no hematoma) and patient has a , spouse.  A+O x4 and making needs known.  CCL access sites C/D/I; no bleeding or hematoma; CMS intact.  VSSA.  SR on monitor.  IV access removed.  Education completed and outlined in AVS or handout: medications reviewed with patient.  Questions answered prior to discharge.  Belongings returned to patient at discharge.    P: Discharged to self care.  Patient to follow up with appts as per discharge instruction.

## 2021-07-20 NOTE — DISCHARGE INSTRUCTIONS
Going Home after an Angioplasty or Stent Placement (Cardiac)  ______________________________________________      After you go home:    Have an adult stay with you for 24 hours.    Drink plenty of fluids.    You may eat your normal diet, unless your doctor tells you otherwise.    For 24 hours:    Relax and take it easy.    Do NOT smoke.    Do NOT make any important or legal decisions.    Do NOT drive or operate machines at home or at work.    Do NOT drink alcohol.    Remove the Band-Aid after 24 hours. If there is minor oozing, apply another Band-aid and remove it after 12 hours.    For 2 days, do NOT have sex or do any heavy exercise.    Do NOT take a bath, or use a hot tub or pool for at least 3 days. You may shower.    Care of groin site  It is normal to have a small bruise or lump at the site.    Do not scrub the site.    For the first 2 days: Do not stoop or squat. When you cough, sneeze or move your bowels, hold your hand over the puncture site and press gently.    Do not lift more than 10 pounds for at least 3 to 5 days.    Do not use lotion or powder near the puncture site for 3 days.    If you start bleeding from the site in your groin, lie down flat and press firmly  on the site. Call your doctor as soon as you can.    Care of wrist or arm site  It is normal to have soreness at the puncture site and mild tingling in your hand for up to 3 days.    For 2 days, do not use your hand or arm to support your weight (such as rising from a chair) or bend your wrist (such as lifting a garage door).    For 2 days, do not lift more than 5 pounds or exercise your arm (tennis, golf or bowling).    If you start bleeding from the site in your arm:    Sit down and press firmly on the site with your fingers for 10 minutes. Call your doctor as soon as you can.    If the bleeding stops, sit still and keep your wrist straight for 2 hours.    Medicines    If you have started taking Plavix or Effient, do not stop taking it  until you talk to your heart doctor (cardiologist).    If you are on metformin (Glucophage), do not restart it until you have blood tests (within 2 to 3 days after discharge). When your doctor tells you it is safe, you may restart the metformin.    If you have stopped any other medicines, check with your nurse or provider about when to restart them.    Call 911 right away if you have bleeding that is heavy or does not stop.    Call your doctor if:    You have a large or growing hard lump around the site.    The site is red, swollen, hot or tender.    Blood or fluid is draining from the site.    You have chills or a fever greater than 101 F (38 C).    Your leg or arm feels numb or cool.    You have hives, a rash or unusual itching.      [facility] [place]:  [phone] (7 days a week)

## 2021-07-20 NOTE — PRE-PROCEDURE
GENERAL PRE-PROCEDURE:   Procedure:  Right heart catheterization, cardiac biopsy and coronary angiogram w/possible PCI  Date/Time:  7/20/2021 9:29 AM    Verbal consent obtained?: Yes    Written consent obtained?: Yes    Risks and benefits: Risks, benefits and alternatives were discussed    Consent given by:  Patient  Patient states understanding of procedure being performed: Yes    Patient's understanding of procedure matches consent: Yes    Procedure consent matches procedure scheduled: Yes    Expected level of sedation:  Moderate  Appropriately NPO:  Yes  ASA Class:  Class 2- mild systemic disease, no acute problems, no functional limitations  Mallampati  :  Grade 2- soft palate, base of uvula, tonsillar pillars, and portion of posterior pharyngeal wall visible  Lungs:  Lungs clear with good breath sounds bilaterally  Heart:  Normal heart sounds and rate  History & Physical reviewed:  History and physical reviewed and no updates needed  Statement of review:  I have reviewed the lab findings, diagnostic data, medications, and the plan for sedation    65 year old with history of ICMY s/p OHT 7/19/20, CKD, type II DM, HTN who presents for routine coronary angiogram, RHC and cardiac biopsy. COVID negative. Recent labs show elevated creatinine from baseline - ensure minimal contrast in biplane room. Consent obtained.     ELIJAH Velasquez, CNP  Structural Heart Nurse Practitioner  [facility]  Clinic: [phone]  Pager: [phone]

## 2021-07-21 ENCOUNTER — TELEPHONE (OUTPATIENT)
Dept: TRANSPLANT | Facility: CLINIC | Age: 66
End: 2021-07-21

## 2021-07-21 ENCOUNTER — OFFICE VISIT (OUTPATIENT)
Dept: OPHTHALMOLOGY | Facility: CLINIC | Age: 66
End: 2021-07-21
Attending: OPHTHALMOLOGY
Payer: COMMERCIAL

## 2021-07-21 DIAGNOSIS — E11.3513 TYPE 2 DIABETES MELLITUS WITH PROLIFERATIVE RETINOPATHY OF BOTH EYES AND MACULAR EDEMA, UNSPECIFIED WHETHER LONG TERM INSULIN USE (H): ICD-10-CM

## 2021-07-21 DIAGNOSIS — Z94.1 HEART TRANSPLANT, ORTHOTOPIC, STATUS (H): Primary | ICD-10-CM

## 2021-07-21 LAB
PATH REPORT.COMMENTS IMP SPEC: NORMAL
PATH REPORT.COMMENTS IMP SPEC: NORMAL
PATH REPORT.FINAL DX SPEC: NORMAL
PATH REPORT.GROSS SPEC: NORMAL
PATH REPORT.MICROSCOPIC SPEC OTHER STN: NORMAL
PATH REPORT.RELEVANT HX SPEC: NORMAL
PHOTO IMAGE: NORMAL

## 2021-07-21 PROCEDURE — G0463 HOSPITAL OUTPT CLINIC VISIT: HCPCS

## 2021-07-21 PROCEDURE — 88350 IMFLUOR EA ADDL 1ANTB STN PX: CPT | Mod: 26 | Performed by: PATHOLOGY

## 2021-07-21 PROCEDURE — 92134 CPTRZ OPH DX IMG PST SGM RTA: CPT | Performed by: OPHTHALMOLOGY

## 2021-07-21 PROCEDURE — 99207 FUNDUS AUTOFLUORESCENCE IMAGE (FAF) OU (BOTH EYES): CPT | Performed by: OPHTHALMOLOGY

## 2021-07-21 PROCEDURE — 67028 INJECTION EYE DRUG: CPT | Mod: RT | Performed by: OPHTHALMOLOGY

## 2021-07-21 PROCEDURE — 67228 TREATMENT X10SV RETINOPATHY: CPT | Mod: RT | Performed by: OPHTHALMOLOGY

## 2021-07-21 PROCEDURE — 92250 FUNDUS PHOTOGRAPHY W/I&R: CPT | Performed by: OPHTHALMOLOGY

## 2021-07-21 PROCEDURE — 250N000011 HC RX IP 250 OP 636: Performed by: OPHTHALMOLOGY

## 2021-07-21 PROCEDURE — 88307 TISSUE EXAM BY PATHOLOGIST: CPT | Mod: 26 | Performed by: PATHOLOGY

## 2021-07-21 PROCEDURE — 250N000009 HC RX 250: Performed by: OPHTHALMOLOGY

## 2021-07-21 PROCEDURE — 88346 IMFLUOR 1ST 1ANTB STAIN PX: CPT | Mod: 26 | Performed by: PATHOLOGY

## 2021-07-21 PROCEDURE — 92235 FLUORESCEIN ANGRPH MLTIFRAME: CPT | Performed by: OPHTHALMOLOGY

## 2021-07-21 RX ORDER — LIDOCAINE HYDROCHLORIDE 20 MG/ML
0.05 INJECTION, SOLUTION EPIDURAL; INFILTRATION; INTRACAUDAL; PERINEURAL
Status: DISCONTINUED | OUTPATIENT
Start: 2021-07-21 | End: 2021-09-28

## 2021-07-21 RX ORDER — EVEROLIMUS 0.5 MG/1
1 TABLET ORAL 2 TIMES DAILY
Qty: 120 TABLET | Refills: 11 | Status: SHIPPED | OUTPATIENT
Start: 2021-07-21 | End: 2021-07-28

## 2021-07-21 RX ADMIN — Medication 1.25 MG: at 10:34

## 2021-07-21 RX ADMIN — LIDOCAINE HYDROCHLORIDE 0.2 MG: 20 INJECTION, SOLUTION EPIDURAL; INFILTRATION; INTRACAUDAL; PERINEURAL at 11:03

## 2021-07-21 ASSESSMENT — TONOMETRY
OD_IOP_MMHG: 19
IOP_METHOD: TONOPEN
OS_IOP_MMHG: 21

## 2021-07-21 ASSESSMENT — SLIT LAMP EXAM - LIDS
COMMENTS: PTOSIS OD>OS
COMMENTS: PTOSIS OD>OS

## 2021-07-21 ASSESSMENT — VISUAL ACUITY
OD_SC: 20/50
METHOD: SNELLEN - LINEAR

## 2021-07-21 ASSESSMENT — CONF VISUAL FIELD
OD_INFERIOR_TEMPORAL_RESTRICTION: 3
OS_SUPERIOR_NASAL_RESTRICTION: 3
OS_INFERIOR_NASAL_RESTRICTION: 3
OD_SUPERIOR_TEMPORAL_RESTRICTION: 3
OS_SUPERIOR_TEMPORAL_RESTRICTION: 3
OS_INFERIOR_TEMPORAL_RESTRICTION: 3
OD_SUPERIOR_NASAL_RESTRICTION: 3

## 2021-07-21 ASSESSMENT — REFRACTION_WEARINGRX
OD_ADD: +2.50
OS_ADD: +2.50

## 2021-07-21 ASSESSMENT — EXTERNAL EXAM - RIGHT EYE: OD_EXAM: NORMAL

## 2021-07-21 ASSESSMENT — EXTERNAL EXAM - LEFT EYE: OS_EXAM: NORMAL

## 2021-07-21 NOTE — NURSING NOTE
Chief Complaints and History of Present Illnesses   Patient presents with     Diabetic Retinopathy Follow Up     6 week follow up both eyes.     Chief Complaint(s) and History of Present Illness(es)     Diabetic Retinopathy Follow Up     Comments: 6 week follow up both eyes.              Comments     Pt here with interp over the phone today.  Pt states vision is the same as last visit.  No eye pain today.  No flashes or floaters.  DM2 BS: 112 this morning per pt.  Lab Results       Component                Value               Date                       A1C                      7.3                 07/19/2021                 A1C                      6.7                 01/14/2021                 A1C                      5.9                 12/07/2020                 A1C                      8.2                 07/03/2020                 A1C                      7.9                 07/08/2019                 A1C                      8.5                 03/11/2019              PABLITO Alberto July 21, 2021 8:39 AM

## 2021-07-21 NOTE — PROGRESS NOTES
CC: follow up proliferative diabetic retinopathy and slowly clearing vitreous hemorrhage right eye   Left eye status post PPV/MP/retinectomy 12/21/20. 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 Denies eye pain.    Interval: Patient reports improved vision in both eyes. Taking latanoprost at bedtime each eye. Pred forte every other day left eye. right VH last visit.     Optical Coherence Tomography: 07/21/21   Right eye: good foveal contour; rare small cystic changes and exudates  Left eye: irregular retina; mild Epiretinal membrane; nasal edema; inferior area of subretinal fluid. stable    FA 7/21/2021   right eye : blockage from heme. PRP scars. Mild neovascularization elsewhere nasally    Plan:   # history of Vitreous hemorrhage, right eye              - started 12/10/21   - multiple Avastin (last 6/9/21)   - slowly improving. Discussed with patient surgical options and the decision was to continue laser and injections. Will reconsider surgery in the future   - Plan for PRP and Avastin 7/21/2021. Follow up 6 weeks for possible injection    # PDR ou     # Funnel RD left eye   - progressed to funnel RD after loss to follow up given heart surgeries     - w retina folded on itself under SO   - guarded prognosis discussed   - continue PF every other day left eye    # Ocuar HTN    - continue latanoprost hs ou     # Visually significant cataract right eye   Consider cataract evaluation in the future      Darryl Kang MD  Vitreoretinal Surgery Fellow  [facility]       ~~~~~~~~~~~~~~~~~~~~~~~~~~~~~~~~~~   Complete documentation of historical and exam elements from today's encounter can be found in the full encounter summary report (not reduplicated in this progress note).  I personally obtained the chief complaint(s) and history of present illness.  I confirmed and edited as necessary the review of systems, past medical/surgical history, family history, social history, and examination findings  as documented by others; and I examined the patient myself.  I personally reviewed the relevant tests, images, and reports as documented above.  I personally reviewed the ophthalmic test(s) associated with this encounter, agree with the interpretation(s) as documented by the resident/fellow, and have edited the corresponding report(s) as necessary.   I formulated and edited as necessary the assessment and plan and discussed the findings and management plan with the patient and family and I was present for the entire procedure performed by the resident/fellow.    Triny Bunch MD   of Ophthalmology.  Retina Service   Department of Ophthalmology and Visual Neurosciences   [facility]  Phone: [phone]   Fax: [phone]

## 2021-07-21 NOTE — TELEPHONE ENCOUNTER
Pt called using  to review remaining testing results.    Angiogram normal- no blockages.  Heart biopsy negative.  Tacro level 6.7.  Goal 6-8.  No dose changes at this time.  EBV and CMV negative.  Discussed switching to from Tacrolimus to Everolimus with pt.  Test claim sent to  Specialty pharmacy.  If insurance will cover, medication will be mailed to pt.  If not, writer will discuss with Dr. Sheridan.  Pt instructed to call coordinator if/when he receives med in the mail to go over administration instructions.  Pt states understanding plan of care and instructions.

## 2021-07-22 LAB
ALLOMAP SCORE (EXTERNAL): 34 (ref 0–40)
ATRIAL RATE - MUSE: 87 BPM
DIASTOLIC BLOOD PRESSURE - MUSE: NORMAL MMHG
INTERPRETATION ECG - MUSE: NORMAL
NEGATIVE PREDICTIVE VALUE PERCENT (EXTERNAL): 98.9 %
P AXIS - MUSE: 23 DEGREES
POSITIVE PREDICTIVE VALUE PERCENT (EXTERNAL): 4.1 %
PR INTERVAL - MUSE: 110 MS
QRS DURATION - MUSE: 84 MS
QT - MUSE: 370 MS
QTC - MUSE: 445 MS
R AXIS - MUSE: -13 DEGREES
SA 1 CELL: NORMAL
SA 1 TEST METHOD: NORMAL
SA 2 CELL: NORMAL
SA 2 TEST METHOD: NORMAL
SA1 HI RISK ABY: NORMAL
SA1 MOD RISK ABY: NORMAL
SA2 HI RISK ABY: NORMAL
SA2 MOD RISK ABY: NORMAL
SYSTOLIC BLOOD PRESSURE - MUSE: NORMAL MMHG
T AXIS - MUSE: 27 DEGREES
UNACCEPTABLE ANTIGENS: NORMAL
UNOS CPRA: 0
VENTRICULAR RATE- MUSE: 87 BPM
ZZZSA 1  COMMENTS: NORMAL
ZZZSA 2 COMMENTS: NORMAL

## 2021-07-23 ENCOUNTER — DOCUMENTATION ONLY (OUTPATIENT)
Dept: TRANSPLANT | Facility: CLINIC | Age: 66
End: 2021-07-23

## 2021-07-23 LAB — HISTOTRAC: YES

## 2021-07-23 NOTE — PROGRESS NOTES
Felipe ordered for 7/20/21 and drawn by lab.  Specimen mishandled, and lab was unable to be run.  Dr. Sheridan notified.

## 2021-07-26 NOTE — TELEPHONE ENCOUNTER
Patient's daughter called regarding a new medication and to see if if the insurance will cover it.

## 2021-07-28 ENCOUNTER — TELEPHONE (OUTPATIENT)
Dept: CARDIOLOGY | Facility: CLINIC | Age: 66
End: 2021-07-28

## 2021-07-28 ENCOUNTER — APPOINTMENT (OUTPATIENT)
Dept: INTERPRETER SERVICES | Facility: CLINIC | Age: 66
End: 2021-07-28
Payer: COMMERCIAL

## 2021-07-28 ENCOUNTER — TELEPHONE (OUTPATIENT)
Dept: TRANSPLANT | Facility: CLINIC | Age: 66
End: 2021-07-28

## 2021-07-28 DIAGNOSIS — Z94.1 HEART REPLACED BY TRANSPLANT (H): Primary | ICD-10-CM

## 2021-07-28 RX ORDER — SIROLIMUS 0.5 MG/1
1 TABLET, FILM COATED ORAL DAILY
Qty: 60 TABLET | Refills: 3 | Status: SHIPPED | OUTPATIENT
Start: 2021-07-28 | End: 2021-09-09

## 2021-07-28 NOTE — TELEPHONE ENCOUNTER
Prior Authorization Approval    Authorization Effective Date: 7/26/2021  Authorization Expiration Date: 12/31/2021  Medication: Everolimus  Approved Dose/Quantity: 120  Reference #: [id number]   Insurance Company: [employer] ([facility]) - Phone [phone] Fax [phone]  Expected CoPay: $360.57       CoPay Card Available:      Foundation Assistance Needed:    Which Pharmacy is filling the prescription (Not needed for infusion/clinic administered): [facility] [address]  Pharmacy Notified: Yes  Patient Notified: Yes

## 2021-07-28 NOTE — TELEPHONE ENCOUNTER
Cost is too much for patient. Plan to start sirolimus instead. Dolores notified to start PA on sirolimus.

## 2021-07-29 NOTE — TELEPHONE ENCOUNTER
Pt called with the assistance of an  regarding cost for everolimus, which has been approved but is too expensive at ~$300 copay. Discussed switching to sirolimus instead, which should be more cost effective.    Informed pt that the plan is to discontinue MMF, start sirolimus, and have patient on sirolimus/tacrolimus combination.    Asked that pt call transplant office to go over how to transition when he receives his sirolimus prescription. Reminded pt not to take it until he talks to his coordinator.    Pt verbalized understanding. Daughter Elisabeth also updated.

## 2021-08-04 ENCOUNTER — APPOINTMENT (OUTPATIENT)
Dept: INTERPRETER SERVICES | Facility: CLINIC | Age: 66
End: 2021-08-04
Payer: COMMERCIAL

## 2021-08-04 DIAGNOSIS — Z79.4 TYPE 2 DIABETES MELLITUS WITH HYPERGLYCEMIA, WITH LONG-TERM CURRENT USE OF INSULIN (H): ICD-10-CM

## 2021-08-04 DIAGNOSIS — E11.65 TYPE 2 DIABETES MELLITUS WITH HYPERGLYCEMIA, WITH LONG-TERM CURRENT USE OF INSULIN (H): ICD-10-CM

## 2021-08-04 RX ORDER — INSULIN LISPRO 100 [IU]/ML
INJECTION, SOLUTION INTRAVENOUS; SUBCUTANEOUS
Qty: 90 ML | Refills: 3 | Status: SHIPPED | OUTPATIENT
Start: 2021-08-04 | End: 2023-01-17

## 2021-08-04 NOTE — TELEPHONE ENCOUNTER
[facility] Call Center    Phone Message    May a detailed message be left on voicemail: no     Reason for Call: Medication Refill Request    Has the patient contacted the pharmacy for the refill? Yes   Name of medication being requested: HUMALOG KWIKPEN 100 UNIT/ML soln  Provider who prescribed the medication: Conner  Pharmacy: [id number] - [facility] [address] AT [facility] [address]  Date medication is needed: asap         Action Taken: Message routed to:  [facility] med refills    Travel Screening: Not Applicable

## 2021-08-04 NOTE — TELEPHONE ENCOUNTER
HUMALOG KWIKPEN 100 UNIT/ML soln  Last Written Prescription Date:  5/29/2021  Last Fill Quantity: 90,   # refills: 3  Last Office Visit :  2/2/2021  Future Office visit:  10/19/2021    Routing refill request to provider for review/approval because:  Drug not on the [facility], P or  Health refill protocol or controlled substance      Claudia Edmondson RN  Central Triage Red Flags/Med Refills

## 2021-08-10 RX ORDER — GLIMEPIRIDE 2 MG/1
6 TABLET ORAL
OUTPATIENT
Start: 2021-08-10

## 2021-08-10 NOTE — TELEPHONE ENCOUNTER
glimepiride (AMARYL) 2 MG tablet       Last Written Prescription Date:  8/18/20  Last Fill Quantity: 270,   # refills: 3  Last Office Visit : 2/2/21 recommended 2 month follow up  Future Office visit:  10/19/21    Routing refill request to provider for review/approval because:  Drug not active on patient's medication list

## 2021-08-11 ENCOUNTER — OFFICE VISIT (OUTPATIENT)
Dept: OPHTHALMOLOGY | Facility: CLINIC | Age: 66
End: 2021-08-11
Attending: OPHTHALMOLOGY
Payer: COMMERCIAL

## 2021-08-11 DIAGNOSIS — E11.3513 TYPE 2 DIABETES MELLITUS WITH PROLIFERATIVE RETINOPATHY OF BOTH EYES AND MACULAR EDEMA, UNSPECIFIED WHETHER LONG TERM INSULIN USE (H): ICD-10-CM

## 2021-08-11 PROCEDURE — G0463 HOSPITAL OUTPT CLINIC VISIT: HCPCS

## 2021-08-11 PROCEDURE — 67028 INJECTION EYE DRUG: CPT | Mod: RT | Performed by: OPHTHALMOLOGY

## 2021-08-11 PROCEDURE — 92134 CPTRZ OPH DX IMG PST SGM RTA: CPT | Performed by: OPHTHALMOLOGY

## 2021-08-11 PROCEDURE — 250N000011 HC RX IP 250 OP 636: Performed by: OPHTHALMOLOGY

## 2021-08-11 PROCEDURE — 92014 COMPRE OPH EXAM EST PT 1/>: CPT | Mod: 25 | Performed by: OPHTHALMOLOGY

## 2021-08-11 RX ADMIN — Medication 1.25 MG: at 09:15

## 2021-08-11 ASSESSMENT — VISUAL ACUITY
OD_SC: 20/50
OD_SC+: -2
OS_SC: 20/500ECC
METHOD: SNELLEN - LINEAR

## 2021-08-11 ASSESSMENT — CONF VISUAL FIELD
OS_INFERIOR_NASAL_RESTRICTION: 3
OS_INFERIOR_TEMPORAL_RESTRICTION: 3
OD_SUPERIOR_NASAL_RESTRICTION: 3
OS_SUPERIOR_TEMPORAL_RESTRICTION: 3
OS_SUPERIOR_NASAL_RESTRICTION: 3
OD_INFERIOR_TEMPORAL_RESTRICTION: 3
OD_SUPERIOR_TEMPORAL_RESTRICTION: 3

## 2021-08-11 ASSESSMENT — EXTERNAL EXAM - LEFT EYE: OS_EXAM: NORMAL

## 2021-08-11 ASSESSMENT — TONOMETRY
OD_IOP_MMHG: 22
IOP_METHOD: TONOPEN
OS_IOP_MMHG: 19

## 2021-08-11 ASSESSMENT — EXTERNAL EXAM - RIGHT EYE: OD_EXAM: NORMAL

## 2021-08-11 ASSESSMENT — SLIT LAMP EXAM - LIDS
COMMENTS: NORMAL
COMMENTS: NORMAL

## 2021-08-11 NOTE — NURSING NOTE
Chief Complaints and History of Present Illnesses   Patient presents with     Diabetic Eye Exam Follow Up     Chief Complaint(s) and History of Present Illness(es)     Diabetic Eye Exam Follow Up     Vision: is stable    Diabetes Type: Type 2 and on insulin    Blood Sugars: is controlled    Pain scale: 0/10              Comments     Lucian is here to follow up on 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, unspecified whether long term insulin use.  He states no vision change since last visit.    Lab Results       Component                Value               Date                       A1C                      7.3                 07/19/2021                 A1C                      6.7                 01/14/2021                 A1C                      5.9                 12/07/2020                 BS today 120    Ivan Tejeda COT 7:48 AM August 11, 2021

## 2021-08-11 NOTE — PROGRESS NOTES
CC: follow up proliferative diabetic retinopathy and slowly clearing vitreous hemorrhage right eye   Left eye status post PPV/MP/retinectomy 12/21/20. 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 Denies eye pain.    Interval: Patient reports slight improvement of vision in the left eye. Otherwise no change or new sx. Taking latanoprost at bedtime each eye. Pred forte every other day left eye. right VH last visit.     Optical Coherence Tomography: 8-11-21  Right eye: good foveal contour; rare small cystic changes and exudates, stable  Left eye: irregular retina; mild Epiretinal membrane; nasal edema; inferior area of subretinal fluid. Stable, stable    FA 7/21/2021   right eye : blockage from heme. PRP scars. Mild neovascularization elsewhere nasally    Plan:   # PDR ou  # Vitreous hemorrhage, right eye- resolving   - multiple Avastin (last Avastin 7/21/2021)   - status post Panretinal laser photocoagulation (PRP) filling  right eye: 7/21/2021; both eyes 6.19      # Funnel RD left eye   - progressed to funnel RD after loss to follow up given heart surgeries     - w retina folded on itself under SO   - guarded prognosis discussed   - continue PF every other day left eye    # Ocuar HTN    - continue latanoprost hs ou     # Visually significant cataract right eye   Consider cataract evaluation in the future    Plan   Avastin inj right eye today  Follow-up in 4 weeks possible inj   ~~~~~~~~~~~~~~~~~~~~~~~~~~~~~~~~~~   Complete documentation of historical and exam elements from today's encounter can be found in the full encounter summary report (not reduplicated in this progress note).  I personally obtained the chief complaint(s) and history of present illness.  I confirmed and edited as necessary the review of systems, past medical/surgical history, family history, social history, and examination findings as documented by others; and I examined the patient myself.  I personally reviewed the relevant tests,  images, and reports as documented above.  I personally reviewed the ophthalmic test(s) associated with this encounter, agree with the interpretation(s) as documented by the resident/fellow, and have edited the corresponding report(s) as necessary.   I formulated and edited as necessary the assessment and plan and discussed the findings and management plan with the patient and family and I was present for the entire procedure performed by the resident/fellow.    Triny Bunch MD   of Ophthalmology.  Retina Service   Department of Ophthalmology and Visual Neurosciences   [facility]  Phone: [phone]   Fax: [phone]

## 2021-08-12 ENCOUNTER — TELEPHONE (OUTPATIENT)
Dept: TRANSPLANT | Facility: CLINIC | Age: 66
End: 2021-08-12

## 2021-08-13 ENCOUNTER — APPOINTMENT (OUTPATIENT)
Dept: INTERPRETER SERVICES | Facility: CLINIC | Age: 66
End: 2021-08-13
Payer: COMMERCIAL

## 2021-08-13 NOTE — TELEPHONE ENCOUNTER
Pt called to see if Sirolimus has arrived from  Specialty Pharmacy yet.  Per pt, he has not received med in the mail.  Writer called pharmacy- they will deliver next week.  Pt instructed to call writer when medication arrives.

## 2021-08-17 ENCOUNTER — APPOINTMENT (OUTPATIENT)
Dept: INTERPRETER SERVICES | Facility: CLINIC | Age: 66
End: 2021-08-17
Payer: COMMERCIAL

## 2021-08-17 DIAGNOSIS — E11.65 TYPE 2 DIABETES MELLITUS WITH HYPERGLYCEMIA, WITH LONG-TERM CURRENT USE OF INSULIN (H): Primary | ICD-10-CM

## 2021-08-17 DIAGNOSIS — Z79.4 TYPE 2 DIABETES MELLITUS WITH HYPERGLYCEMIA, WITH LONG-TERM CURRENT USE OF INSULIN (H): Primary | ICD-10-CM

## 2021-08-17 LAB
DONOR IDENTIFICATION: NORMAL
DSA COMMENTS: NORMAL
DSA PRESENT: NO
DSA TEST METHOD: NORMAL
ORGAN: NORMAL

## 2021-08-18 DIAGNOSIS — Z94.1 HEART TRANSPLANT, ORTHOTOPIC, STATUS (H): ICD-10-CM

## 2021-08-18 RX ORDER — TAMSULOSIN HYDROCHLORIDE 0.4 MG/1
0.4 CAPSULE ORAL DAILY
Qty: 90 CAPSULE | Refills: 3 | Status: SHIPPED | OUTPATIENT
Start: 2021-08-18 | End: 2022-09-02

## 2021-08-18 NOTE — TELEPHONE ENCOUNTER
sitagliptin (JANUVIA) 100 MG tablet  Last Written Prescription Date:  ?  Last Fill Quantity: ?,   # refills: ?  Last Office Visit : 2/2/2021  Future Office visit:  10/19/2021    Routing refill request to provider for review/approval because:  Drug not active on patient's medication list      Claudia Edmondson RN  Central Triage Red Flags/Med Refills

## 2021-08-23 ENCOUNTER — TELEPHONE (OUTPATIENT)
Dept: TRANSPLANT | Facility: CLINIC | Age: 66
End: 2021-08-23

## 2021-08-23 DIAGNOSIS — Z94.1 HEART REPLACED BY TRANSPLANT (H): Primary | ICD-10-CM

## 2021-08-23 ASSESSMENT — ENCOUNTER SYMPTOMS: NEW SYMPTOMS OF CORONARY ARTERY DISEASE: 0

## 2021-08-23 NOTE — TELEPHONE ENCOUNTER
Returned call to pt regarding starting Sirolimus.  Pt called using , and given the following instructions:  Tonight take your normal dose of Mycophenolate and Tacrolimus and then discontinue Mycophenolate.  Tomorrow morning take your normal dose of Tacrolimus.  4 hours later start taking Sirolimus 1mg once a day.  In one week have your Tacrolimus and Sirolimus levels checked (12 hour and 24 hour troughs.)  You will be scheduled for a heart biopsy in one month.  Pt was able to repeat back instructions to coordinator using .  The above instructions were also called to pt's daughter, Elisabeth, who will see pt later today to review.

## 2021-08-24 DIAGNOSIS — Z79.4 TYPE 2 DIABETES MELLITUS WITH HYPERGLYCEMIA, WITH LONG-TERM CURRENT USE OF INSULIN (H): Primary | ICD-10-CM

## 2021-08-24 DIAGNOSIS — E11.65 TYPE 2 DIABETES MELLITUS WITH HYPERGLYCEMIA, WITH LONG-TERM CURRENT USE OF INSULIN (H): Primary | ICD-10-CM

## 2021-08-24 RX ORDER — GLIMEPIRIDE 2 MG/1
6 TABLET ORAL
Qty: 90 TABLET | Refills: 0 | Status: ON HOLD | OUTPATIENT
Start: 2021-08-24 | End: 2021-09-28

## 2021-08-24 NOTE — PROGRESS NOTES
Diabetes Consult Daily  Progress Note          Assessment/Plan:    Lucian Henderson Sr is a 64 year old male with uncontrolled type 2 diabetes, hypertension, hyperlipidemia, CKD stage 3, hx of RIJ clot and remains on fondaparinux due to HIT hx, ICM s/p OHT 7/19/2020, obesity admitted on (9/22/2020) for left infraclavicular AICD pocket infection s/p I&D of left infraclavicular wound and midline chest tube wound  on (9/29/2020) by Dr. Tong      Type 2 diabetes: uncontrolled with steroid induced hyperglycemia  Prednisone 5 mg am and 5 mg PM. Currently being treated for leukopenia and infection.     Plan(orders put in for you)  - Continue NPH 40 units in the morning (0900)  - Continue NPH 8 units at ( 2100)  - Continue novolog 1 unit per 4g CHO breakfast and snacks   -Increase Novolog CHO coverage to 1 unit per 3 grams of  CHO for lunch and dunner  - Continue Novolog high correction scale before meals and at bedtime  - monitor glucose before meals, HS and 0200  -hypoglycemia protocol     If planned for discharge, tentatively recommend  -NPH 40 units am, 8 units pm.  -Novolog 10 units breakfast, 14 units lunch and 10 units dinner.   -Follow up with Yoly Prieto in 1 week, provide patient with phone number [phone] to reach endocrine fellow oncall if experienced any issue with scheduling or hypoglycemia.             Interval History:   The last 24 hours progress and nursing notes reviewed.    Declined    Yesterday had hyperglycemia starting from morning, persisted until evening. He said did not take any snacks or unconvered food in between meals.   Appetite is reported as good, denies n/v    PTA:  NPH 35 units am  The fixed meal was added to the sliding scale. ( as listed below)  Para -169 give 17 units antes de comer.  -199 give 19 units.  -229 give 21 units.  -259 give 23 units.  -289 give 25 units.  -319 give 27 units.  -349  "give 29 units.  BG >/= 350 give 31 units.      Recent Labs   Lab 10/04/20  0847 10/04/20  0601 10/04/20  0313 10/03/20  2200 10/03/20  1721 10/03/20  1229 10/03/20  0743 10/03/20  0519 10/02/20  0537 10/02/20  0537 10/01/20  0603 10/01/20  0603 09/30/20  0540 09/30/20  0540 09/29/20  0523 09/29/20  0523   GLC  --  177*  --   --   --   --   --  206*  --  170*  --  233*  --  162*  --  279*   *  --  170* 290* 375* 360* 209*  --    < >  --    < >  --    < >  --    < >  --     < > = values in this interval not displayed.               Review of Systems:   See interval hx          Medications:     Orders Placed This Encounter      Consistent Carbohydrate Diet 9232-3877 Calories: Moderate Consistent CHO (4-6 CHO units/meal)         Physical:   Vital signs:  Temp: 97.8  F (36.6  C) Temp src: Oral BP: 137/71 Pulse: 80   Resp: 14 SpO2: 99 % O2 Device: None (Room air) Oxygen Delivery: 2 LPM   Weight: 79.7 kg (175 lb 9.6 oz)  Estimated body mass index is 29.22 kg/m  as calculated from the following:    Height as of 8/24/20: 1.651 m (5' 5\").    Weight as of this encounter: 79.7 kg (175 lb 9.6 oz).   General:  Healthy, alert and oriented X3, NAD, answering questions appropriately.  Pulmonary: No audible wheeze or cough. Able to speak fully in complete sentences.   Neurological: Alert. Mentation intact and speech normal.  Psychological: mentation appears normal, affect normal/bright, judgement and insight intact, normal speech  Physical exam is limited due to Phone visit related to COVID-19              Data:     Lab Results   Component Value Date    A1C 8.2 07/03/2020    A1C 7.9 07/08/2019    A1C 8.5 03/11/2019    A1C 7.6 10/16/2018    A1C 9.8 09/06/2017            Kheng Denys Cook   Endocrinology Fellow PGY-5  Discussed with staff endocrinologist Dr Nagy    ATTENDING NOTE    I have spoken to the patient, reviewed and edited the fellow's note, and agree with the plan of care.    Omaira Nagy MD PhD  Associate "   Division of Endocrinology and Diabetes         Improved

## 2021-08-24 NOTE — TELEPHONE ENCOUNTER
glimepiride (AMARYL) 2 MG tablet  Last Written Prescription Date:  ?  Last Fill Quantity: ?,   # refills: ?  Last Office Visit : 2/2/2021  Future Office visit:  10/19/2021    Routing refill request to provider for review/approval because:  Second Request  Medication was discontinued 8/10/2021  Asked Pt to follow up with an appointment to discuss continued use.   Appointment 10.19/2021.  Please advise regarding refills.      Claudia Edmondson RN  Central Triage Red Flags/Med Refills        Marisabel Prieto PA-C [facility]    8/10/21 10:52 PM  Note     Med discontinued. Advise follow-up to discuss if wishes to resume.

## 2021-08-25 DIAGNOSIS — Z94.1 HEART TRANSPLANT, ORTHOTOPIC, STATUS (H): ICD-10-CM

## 2021-08-26 RX ORDER — SULFAMETHOXAZOLE AND TRIMETHOPRIM 400; 80 MG/1; MG/1
TABLET ORAL
Qty: 90 TABLET | Refills: 3 | Status: SHIPPED | OUTPATIENT
Start: 2021-08-26 | End: 2021-11-30

## 2021-08-31 DIAGNOSIS — Z94.1 HEART TRANSPLANT, ORTHOTOPIC, STATUS (H): ICD-10-CM

## 2021-08-31 DIAGNOSIS — E11.3513 TYPE 2 DIABETES MELLITUS WITH PROLIFERATIVE RETINOPATHY OF BOTH EYES AND MACULAR EDEMA, UNSPECIFIED WHETHER LONG TERM INSULIN USE (H): Primary | ICD-10-CM

## 2021-08-31 DIAGNOSIS — Z11.59 ENCOUNTER FOR SCREENING FOR OTHER VIRAL DISEASES: ICD-10-CM

## 2021-08-31 RX ORDER — MYCOPHENOLATE MOFETIL 250 MG/1
CAPSULE ORAL
Refills: 3 | OUTPATIENT
Start: 2021-08-31

## 2021-09-02 DIAGNOSIS — Z94.1 HEART TRANSPLANT, ORTHOTOPIC, STATUS (H): Primary | ICD-10-CM

## 2021-09-07 ENCOUNTER — TELEPHONE (OUTPATIENT)
Dept: TRANSPLANT | Facility: CLINIC | Age: 66
End: 2021-09-07

## 2021-09-07 ENCOUNTER — APPOINTMENT (OUTPATIENT)
Dept: INTERPRETER SERVICES | Facility: CLINIC | Age: 66
End: 2021-09-07
Payer: COMMERCIAL

## 2021-09-07 DIAGNOSIS — E11.65 TYPE 2 DIABETES MELLITUS WITH HYPERGLYCEMIA, WITH LONG-TERM CURRENT USE OF INSULIN (H): ICD-10-CM

## 2021-09-07 DIAGNOSIS — Z79.4 TYPE 2 DIABETES MELLITUS WITH HYPERGLYCEMIA, WITH LONG-TERM CURRENT USE OF INSULIN (H): ICD-10-CM

## 2021-09-08 ENCOUNTER — LAB (OUTPATIENT)
Dept: LAB | Facility: CLINIC | Age: 66
End: 2021-09-08
Payer: COMMERCIAL

## 2021-09-08 ENCOUNTER — OFFICE VISIT (OUTPATIENT)
Dept: OPHTHALMOLOGY | Facility: CLINIC | Age: 66
End: 2021-09-08
Attending: OPHTHALMOLOGY
Payer: COMMERCIAL

## 2021-09-08 ENCOUNTER — LAB (OUTPATIENT)
Dept: LAB | Facility: CLINIC | Age: 66
End: 2021-09-08

## 2021-09-08 DIAGNOSIS — Z94.1 HEART TRANSPLANT, ORTHOTOPIC, STATUS (H): Primary | ICD-10-CM

## 2021-09-08 DIAGNOSIS — Z94.1 HEART REPLACED BY TRANSPLANT (H): ICD-10-CM

## 2021-09-08 DIAGNOSIS — E11.3513 TYPE 2 DIABETES MELLITUS WITH PROLIFERATIVE RETINOPATHY OF BOTH EYES AND MACULAR EDEMA, UNSPECIFIED WHETHER LONG TERM INSULIN USE (H): ICD-10-CM

## 2021-09-08 DIAGNOSIS — Z94.1 HEART TRANSPLANT, ORTHOTOPIC, STATUS (H): ICD-10-CM

## 2021-09-08 LAB
ANION GAP SERPL CALCULATED.3IONS-SCNC: 4 MMOL/L (ref 3–14)
BASOPHILS # BLD AUTO: 0 10E3/UL (ref 0–0.2)
BASOPHILS NFR BLD AUTO: 0 %
BUN SERPL-MCNC: 30 MG/DL (ref 7–30)
CALCIUM SERPL-MCNC: 8.8 MG/DL (ref 8.5–10.1)
CHLORIDE BLD-SCNC: 111 MMOL/L (ref 94–109)
CO2 SERPL-SCNC: 28 MMOL/L (ref 20–32)
CREAT SERPL-MCNC: 1.97 MG/DL (ref 0.66–1.25)
EOSINOPHIL # BLD AUTO: 0.1 10E3/UL (ref 0–0.7)
EOSINOPHIL NFR BLD AUTO: 1 %
ERYTHROCYTE [DISTWIDTH] IN BLOOD BY AUTOMATED COUNT: 13.3 % (ref 10–15)
GFR SERPL CREATININE-BSD FRML MDRD: 34 ML/MIN/1.73M2
GLUCOSE BLD-MCNC: 98 MG/DL (ref 70–99)
HCT VFR BLD AUTO: 38.7 % (ref 40–53)
HGB BLD-MCNC: 12.9 G/DL (ref 13.3–17.7)
IMM GRANULOCYTES # BLD: 0.1 10E3/UL
IMM GRANULOCYTES NFR BLD: 1 %
LYMPHOCYTES # BLD AUTO: 1.1 10E3/UL (ref 0.8–5.3)
LYMPHOCYTES NFR BLD AUTO: 21 %
MCH RBC QN AUTO: 30.2 PG (ref 26.5–33)
MCHC RBC AUTO-ENTMCNC: 33.3 G/DL (ref 31.5–36.5)
MCV RBC AUTO: 91 FL (ref 78–100)
MONOCYTES # BLD AUTO: 0.5 10E3/UL (ref 0–1.3)
MONOCYTES NFR BLD AUTO: 9 %
NEUTROPHILS # BLD AUTO: 3.6 10E3/UL (ref 1.6–8.3)
NEUTROPHILS NFR BLD AUTO: 68 %
NRBC # BLD AUTO: 0 10E3/UL
NRBC BLD AUTO-RTO: 0 /100
PLATELET # BLD AUTO: 187 10E3/UL (ref 150–450)
POTASSIUM BLD-SCNC: 4.3 MMOL/L (ref 3.4–5.3)
RBC # BLD AUTO: 4.27 10E6/UL (ref 4.4–5.9)
SIROLIMUS BLD-MCNC: <2 UG/L (ref 5–15)
SODIUM SERPL-SCNC: 143 MMOL/L (ref 133–144)
TACROLIMUS BLD-MCNC: 6.6 UG/L (ref 5–15)
TME LAST DOSE: ABNORMAL H
TME LAST DOSE: ABNORMAL H
TME LAST DOSE: NORMAL H
TME LAST DOSE: NORMAL H
WBC # BLD AUTO: 5.4 10E3/UL (ref 4–11)

## 2021-09-08 PROCEDURE — 80048 BASIC METABOLIC PNL TOTAL CA: CPT | Performed by: PATHOLOGY

## 2021-09-08 PROCEDURE — G0463 HOSPITAL OUTPT CLINIC VISIT: HCPCS | Mod: 25

## 2021-09-08 PROCEDURE — 85025 COMPLETE CBC W/AUTO DIFF WBC: CPT | Performed by: PATHOLOGY

## 2021-09-08 PROCEDURE — 250N000009 HC RX 250: Performed by: OPHTHALMOLOGY

## 2021-09-08 PROCEDURE — 92134 CPTRZ OPH DX IMG PST SGM RTA: CPT | Performed by: OPHTHALMOLOGY

## 2021-09-08 PROCEDURE — 67028 INJECTION EYE DRUG: CPT | Mod: RT | Performed by: OPHTHALMOLOGY

## 2021-09-08 PROCEDURE — 36415 COLL VENOUS BLD VENIPUNCTURE: CPT | Performed by: PATHOLOGY

## 2021-09-08 PROCEDURE — 80195 ASSAY OF SIROLIMUS: CPT | Performed by: INTERNAL MEDICINE

## 2021-09-08 PROCEDURE — 250N000011 HC RX IP 250 OP 636: Performed by: OPHTHALMOLOGY

## 2021-09-08 PROCEDURE — 80197 ASSAY OF TACROLIMUS: CPT | Performed by: INTERNAL MEDICINE

## 2021-09-08 RX ADMIN — Medication 1.25 MG: at 09:10

## 2021-09-08 RX ADMIN — LIDOCAINE HYDROCHLORIDE 0.2 MG: 20 INJECTION, SOLUTION EPIDURAL; INFILTRATION; INTRACAUDAL; PERINEURAL at 09:09

## 2021-09-08 ASSESSMENT — EXTERNAL EXAM - RIGHT EYE: OD_EXAM: NORMAL

## 2021-09-08 ASSESSMENT — REFRACTION_WEARINGRX
OS_ADD: +2.50
OD_ADD: +2.50

## 2021-09-08 ASSESSMENT — CONF VISUAL FIELD
OD_SUPERIOR_NASAL_RESTRICTION: 3
OD_SUPERIOR_TEMPORAL_RESTRICTION: 3
OD_INFERIOR_TEMPORAL_RESTRICTION: 3
OS_INFERIOR_NASAL_RESTRICTION: 3
OS_INFERIOR_TEMPORAL_RESTRICTION: 3
OS_SUPERIOR_NASAL_RESTRICTION: 3
OS_SUPERIOR_TEMPORAL_RESTRICTION: 3

## 2021-09-08 ASSESSMENT — SLIT LAMP EXAM - LIDS
COMMENTS: NORMAL
COMMENTS: NORMAL

## 2021-09-08 ASSESSMENT — VISUAL ACUITY
OD_SC+: -1
OS_SC: 20/500
METHOD: SNELLEN - LINEAR
OD_SC: 20/50

## 2021-09-08 ASSESSMENT — TONOMETRY
OD_IOP_MMHG: 16
IOP_METHOD: TONOPEN
OS_IOP_MMHG: 17

## 2021-09-08 ASSESSMENT — EXTERNAL EXAM - LEFT EYE: OS_EXAM: NORMAL

## 2021-09-08 NOTE — PROGRESS NOTES
CC: follow up proliferative diabetic retinopathy and slowly clearing vitreous hemorrhage right eye   Left eye status post PPV/MP/retinectomy 12/21/20. 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 Denies eye pain.    Interval: Patient reports slight improvement of vision in the left eye. Otherwise no change or new sx. Taking latanoprost at bedtime each eye. Pred forte every other day left eye. right VH last visit.     Optical Coherence Tomography: 9-8-21  Right eye: good foveal contour; rare small cystic changes and exudates, stable  Left eye: irregular retina; mild Epiretinal membrane; nasal edema; inferior area of subretinal fluid. Stable, stable    FA 7/21/2021   right eye : blockage from heme. PRP scars. Mild neovascularization elsewhere nasally    Plan:   # PDR ou  # Vitreous hemorrhage, right eye- resolving   - multiple Avastin (last Avastin 8/11/2021)   - status post Panretinal laser photocoagulation (PRP) filling  right eye: 7/21/2021; both eyes 6.19      # Funnel RD left eye   - progressed to funnel RD after loss to follow up given heart surgeries     - w retina folded on itself under SO   - guarded prognosis discussed   - continue PF every other day left eye    # Ocuar HTN    - continue latanoprost hs ou     # Visually significant cataract right eye   Consider cataract evaluation in the future    Plan   avastin inj right eye today   Follow-up in 8 weeks for Optical Coherence Tomography both eyes and possible injection right eye   ~~~~~~~~~~~~~~~~~~~~~~~~~~~~~~~~~~   Complete documentation of historical and exam elements from today's encounter can be found in the full encounter summary report (not reduplicated in this progress note).  I personally obtained the chief complaint(s) and history of present illness.  I confirmed and edited as necessary the review of systems, past medical/surgical history, family history, social history, and examination findings as documented by others; and I examined the  patient myself.  I personally reviewed the relevant tests, images, and reports as documented above.  I personally reviewed the ophthalmic test(s) associated with this encounter, agree with the interpretation(s) as documented by the resident/fellow, and have edited the corresponding report(s) as necessary.   I formulated and edited as necessary the assessment and plan and discussed the findings and management plan with the patient and family and I was present for the entire procedure performed by the resident/fellow.    Triny Bunch MD   of Ophthalmology.  Retina Service   Department of Ophthalmology and Visual Neurosciences   [facility]  Phone: [phone]   Fax: [phone]

## 2021-09-08 NOTE — NURSING NOTE
Chief Complaints and History of Present Illnesses   Patient presents with     Diabetic Retinopathy Follow Up     4 week follow up both eyes.     Chief Complaint(s) and History of Present Illness(es)     Diabetic Retinopathy Follow Up     Comments: 4 week follow up both eyes.              Comments     Pt here with  over the phone today.  Pt states vision is about the same as last visit. No eye pain today.  No flashes or floaters. No redness or dryness.    DM2 BS: 115 this morning per pt.  Lab Results       Component                Value               Date                       A1C                      7.3                 07/19/2021                 A1C                      6.7                 01/14/2021                 A1C                      5.9                 12/07/2020                 A1C                      8.2                 07/03/2020                 A1C                      7.9                 07/08/2019                 A1C                      8.5                 03/11/2019              PABLITO Alberto September 8, 2021 8:10 AM

## 2021-09-08 NOTE — TELEPHONE ENCOUNTER
Pen needle    2/2/2021  [facility] [place]   Marisabel Prieto PA-C  Endocrinology, Diabetes, and Metabolism

## 2021-09-09 ENCOUNTER — TELEPHONE (OUTPATIENT)
Dept: TRANSPLANT | Facility: CLINIC | Age: 66
End: 2021-09-09

## 2021-09-09 ENCOUNTER — APPOINTMENT (OUTPATIENT)
Dept: INTERPRETER SERVICES | Facility: CLINIC | Age: 66
End: 2021-09-09
Payer: COMMERCIAL

## 2021-09-09 DIAGNOSIS — Z94.1 HEART REPLACED BY TRANSPLANT (H): ICD-10-CM

## 2021-09-09 DIAGNOSIS — Z94.1 HEART TRANSPLANT, ORTHOTOPIC, STATUS (H): Primary | ICD-10-CM

## 2021-09-09 RX ORDER — SIROLIMUS 0.5 MG/1
2 TABLET, FILM COATED ORAL DAILY
Qty: 120 TABLET | Refills: 11 | Status: SHIPPED | OUTPATIENT
Start: 2021-09-09 | End: 2021-09-14

## 2021-09-09 NOTE — TELEPHONE ENCOUNTER
Pt called with Tacro and Rapa results.  Tacro level 6.6.  Goal 6-8.  No dose changes at this time.    Rapa level undetectable.  Pt to increase dose to 2mg daily and recheck a level on Monday 9/13.    Results/instructions called to pt using .  Pt's daughter, Elisabeth, also called with above instructions.  Pt and Elisabeth state understanding.

## 2021-09-13 ENCOUNTER — LAB (OUTPATIENT)
Dept: LAB | Facility: CLINIC | Age: 66
End: 2021-09-13
Payer: COMMERCIAL

## 2021-09-13 DIAGNOSIS — Z94.1 HEART TRANSPLANT, ORTHOTOPIC, STATUS (H): ICD-10-CM

## 2021-09-13 LAB
ANION GAP SERPL CALCULATED.3IONS-SCNC: 7 MMOL/L (ref 3–14)
BASOPHILS # BLD AUTO: 0 10E3/UL (ref 0–0.2)
BASOPHILS NFR BLD AUTO: 0 %
BUN SERPL-MCNC: 29 MG/DL (ref 7–30)
CALCIUM SERPL-MCNC: 9 MG/DL (ref 8.5–10.1)
CHLORIDE BLD-SCNC: 110 MMOL/L (ref 94–109)
CO2 SERPL-SCNC: 25 MMOL/L (ref 20–32)
CREAT SERPL-MCNC: 1.86 MG/DL (ref 0.66–1.25)
EOSINOPHIL # BLD AUTO: 0.1 10E3/UL (ref 0–0.7)
EOSINOPHIL NFR BLD AUTO: 2 %
ERYTHROCYTE [DISTWIDTH] IN BLOOD BY AUTOMATED COUNT: 13.1 % (ref 10–15)
GFR SERPL CREATININE-BSD FRML MDRD: 37 ML/MIN/1.73M2
GLUCOSE BLD-MCNC: 128 MG/DL (ref 70–99)
HCT VFR BLD AUTO: 40.4 % (ref 40–53)
HGB BLD-MCNC: 13.7 G/DL (ref 13.3–17.7)
IMM GRANULOCYTES # BLD: 0 10E3/UL
IMM GRANULOCYTES NFR BLD: 1 %
LYMPHOCYTES # BLD AUTO: 1 10E3/UL (ref 0.8–5.3)
LYMPHOCYTES NFR BLD AUTO: 22 %
MCH RBC QN AUTO: 30.4 PG (ref 26.5–33)
MCHC RBC AUTO-ENTMCNC: 33.9 G/DL (ref 31.5–36.5)
MCV RBC AUTO: 90 FL (ref 78–100)
MONOCYTES # BLD AUTO: 0.4 10E3/UL (ref 0–1.3)
MONOCYTES NFR BLD AUTO: 10 %
NEUTROPHILS # BLD AUTO: 2.8 10E3/UL (ref 1.6–8.3)
NEUTROPHILS NFR BLD AUTO: 65 %
NRBC # BLD AUTO: 0 10E3/UL
NRBC BLD AUTO-RTO: 0 /100
PLATELET # BLD AUTO: 177 10E3/UL (ref 150–450)
POTASSIUM BLD-SCNC: 4.2 MMOL/L (ref 3.4–5.3)
RBC # BLD AUTO: 4.5 10E6/UL (ref 4.4–5.9)
SIROLIMUS BLD-MCNC: 3.8 UG/L (ref 5–15)
SODIUM SERPL-SCNC: 142 MMOL/L (ref 133–144)
TME LAST DOSE: ABNORMAL H
TME LAST DOSE: ABNORMAL H
WBC # BLD AUTO: 4.3 10E3/UL (ref 4–11)

## 2021-09-13 PROCEDURE — 85025 COMPLETE CBC W/AUTO DIFF WBC: CPT | Performed by: PATHOLOGY

## 2021-09-13 PROCEDURE — 80195 ASSAY OF SIROLIMUS: CPT | Performed by: INTERNAL MEDICINE

## 2021-09-13 PROCEDURE — 80048 BASIC METABOLIC PNL TOTAL CA: CPT | Performed by: PATHOLOGY

## 2021-09-13 PROCEDURE — 36415 COLL VENOUS BLD VENIPUNCTURE: CPT | Performed by: PATHOLOGY

## 2021-09-14 ENCOUNTER — APPOINTMENT (OUTPATIENT)
Dept: INTERPRETER SERVICES | Facility: CLINIC | Age: 66
End: 2021-09-14
Payer: COMMERCIAL

## 2021-09-14 ENCOUNTER — TELEPHONE (OUTPATIENT)
Dept: TRANSPLANT | Facility: CLINIC | Age: 66
End: 2021-09-14

## 2021-09-14 DIAGNOSIS — Z94.1 HEART REPLACED BY TRANSPLANT (H): Primary | ICD-10-CM

## 2021-09-14 DIAGNOSIS — Z94.1 HEART REPLACED BY TRANSPLANT (H): ICD-10-CM

## 2021-09-14 RX ORDER — SIROLIMUS 0.5 MG/1
3 TABLET, FILM COATED ORAL DAILY
Qty: 180 TABLET | Refills: 11 | Status: SHIPPED | OUTPATIENT
Start: 2021-09-14 | End: 2021-10-08

## 2021-09-14 NOTE — TELEPHONE ENCOUNTER
Pt called using  to go over lab results.  Sirolimus level 3.8.  Goal 6-8.  Pt to increase dose to 3mg daily and repeat a level in one week.  [place] app[place] next Tuesday [place] [facility]  Pt states he is nearly out of Sirolimus- has one dose left for tomorrow.  [facility] and confirmed that a refill with be delivered to pt later today.  Pt also expressed concern with the cost of Rx.  VM left [facility] to inquire if pt would qualify for any prescription assistance or co-pay cards.  Above information was also left via VM with pt's daughter, Elisabeth.  Pt states understanding above information and instructions.

## 2021-09-16 ENCOUNTER — TELEPHONE (OUTPATIENT)
Dept: CARE COORDINATION | Facility: CLINIC | Age: 66
End: 2021-09-16

## 2021-09-16 ENCOUNTER — APPOINTMENT (OUTPATIENT)
Dept: INTERPRETER SERVICES | Facility: CLINIC | Age: 66
End: 2021-09-16
Payer: COMMERCIAL

## 2021-09-16 NOTE — PROGRESS NOTES
Transplant Social Work Services Phone Call      Data: Transplant  received message from pt expressing financial concerns with his medication. Writer contacted pt, via phone, using  services. Writer spoke to pt and his wife, Shivani. They report concerns paying for the Siriolimus medication.   Writer discussed PAP program through [employer] and confirmed that best way to get them the information was through email vs my chart. Writer confirmed email of [email or web address]. Asked them to call program as program will ask them financial information to determine eligibility. Asked them to update writer if they are eligible and writer will assist in submitting information.     Intervention: Community Resource   Assessment: Pt and wife expressing financial hardship in being able to pay for the Sirolimus medication. They report that family has been financially assisting them. Pt confirms he received a shipment this week to last a month. There are no concerns at this time that pt does not have enough medication.   Education provided by : Ongoing Social Work assistance in applying for PAP  Plan:    Pt and wife to call [employer] to determine eligibility and will update writer after call.

## 2021-09-21 ENCOUNTER — LAB (OUTPATIENT)
Dept: LAB | Facility: CLINIC | Age: 66
End: 2021-09-21
Payer: COMMERCIAL

## 2021-09-21 DIAGNOSIS — E11.65 TYPE 2 DIABETES MELLITUS WITH HYPERGLYCEMIA, WITH LONG-TERM CURRENT USE OF INSULIN (H): ICD-10-CM

## 2021-09-21 DIAGNOSIS — Z79.4 TYPE 2 DIABETES MELLITUS WITH HYPERGLYCEMIA, WITH LONG-TERM CURRENT USE OF INSULIN (H): ICD-10-CM

## 2021-09-21 DIAGNOSIS — Z94.1 HEART REPLACED BY TRANSPLANT (H): ICD-10-CM

## 2021-09-21 LAB
ANION GAP SERPL CALCULATED.3IONS-SCNC: 5 MMOL/L (ref 3–14)
BASOPHILS # BLD AUTO: 0 10E3/UL (ref 0–0.2)
BASOPHILS NFR BLD AUTO: 0 %
BUN SERPL-MCNC: 29 MG/DL (ref 7–30)
CALCIUM SERPL-MCNC: 9 MG/DL (ref 8.5–10.1)
CHLORIDE BLD-SCNC: 108 MMOL/L (ref 94–109)
CO2 SERPL-SCNC: 27 MMOL/L (ref 20–32)
CREAT SERPL-MCNC: 1.78 MG/DL (ref 0.66–1.25)
EOSINOPHIL # BLD AUTO: 0.1 10E3/UL (ref 0–0.7)
EOSINOPHIL NFR BLD AUTO: 1 %
ERYTHROCYTE [DISTWIDTH] IN BLOOD BY AUTOMATED COUNT: 12.4 % (ref 10–15)
GFR SERPL CREATININE-BSD FRML MDRD: 39 ML/MIN/1.73M2
GLUCOSE BLD-MCNC: 171 MG/DL (ref 70–99)
HCT VFR BLD AUTO: 41.3 % (ref 40–53)
HGB BLD-MCNC: 13.6 G/DL (ref 13.3–17.7)
IMM GRANULOCYTES # BLD: 0.1 10E3/UL
IMM GRANULOCYTES NFR BLD: 1 %
LYMPHOCYTES # BLD AUTO: 0.9 10E3/UL (ref 0.8–5.3)
LYMPHOCYTES NFR BLD AUTO: 18 %
MCH RBC QN AUTO: 30.1 PG (ref 26.5–33)
MCHC RBC AUTO-ENTMCNC: 32.9 G/DL (ref 31.5–36.5)
MCV RBC AUTO: 91 FL (ref 78–100)
MONOCYTES # BLD AUTO: 0.5 10E3/UL (ref 0–1.3)
MONOCYTES NFR BLD AUTO: 9 %
NEUTROPHILS # BLD AUTO: 3.5 10E3/UL (ref 1.6–8.3)
NEUTROPHILS NFR BLD AUTO: 71 %
NRBC # BLD AUTO: 0 10E3/UL
NRBC BLD AUTO-RTO: 0 /100
PLATELET # BLD AUTO: 180 10E3/UL (ref 150–450)
POTASSIUM BLD-SCNC: 4.2 MMOL/L (ref 3.4–5.3)
RBC # BLD AUTO: 4.52 10E6/UL (ref 4.4–5.9)
SIROLIMUS BLD-MCNC: 6 UG/L (ref 5–15)
SODIUM SERPL-SCNC: 140 MMOL/L (ref 133–144)
TME LAST DOSE: NORMAL H
TME LAST DOSE: NORMAL H
WBC # BLD AUTO: 5 10E3/UL (ref 4–11)

## 2021-09-21 PROCEDURE — 85025 COMPLETE CBC W/AUTO DIFF WBC: CPT | Performed by: PATHOLOGY

## 2021-09-21 PROCEDURE — 36415 COLL VENOUS BLD VENIPUNCTURE: CPT | Performed by: PATHOLOGY

## 2021-09-21 PROCEDURE — 80195 ASSAY OF SIROLIMUS: CPT | Performed by: INTERNAL MEDICINE

## 2021-09-21 PROCEDURE — 80048 BASIC METABOLIC PNL TOTAL CA: CPT | Performed by: PATHOLOGY

## 2021-09-22 ENCOUNTER — TELEPHONE (OUTPATIENT)
Dept: TRANSPLANT | Facility: CLINIC | Age: 66
End: 2021-09-22

## 2021-09-22 NOTE — TELEPHONE ENCOUNTER
Medication requested: GLIMEPIRIDE 2MG TABLETS Take 3 tablets (6 mg) by mouth daily (with breakfast)  Last Written Prescription Date:  8/24/21  Last Fill Quantity: 90,   # refills: 0  Last Office Visit : 2/2/2021  Future Office visit:  10/19/2021 [place]    Routing refill request to provider for review/approval because:  Abnormal lab/ heart transplant   9/21/21 CR 1.78*

## 2021-09-22 NOTE — TELEPHONE ENCOUNTER
Pt called using .  Sirolimus level 6.  Goal 6-8. Pt to keep taking 3mg once day. Pt and wife state understanding.

## 2021-09-24 ENCOUNTER — LAB (OUTPATIENT)
Dept: LAB | Facility: CLINIC | Age: 66
End: 2021-09-24
Attending: INTERNAL MEDICINE

## 2021-09-24 DIAGNOSIS — Z11.59 ENCOUNTER FOR SCREENING FOR OTHER VIRAL DISEASES: ICD-10-CM

## 2021-09-24 PROCEDURE — U0005 INFEC AGEN DETEC AMPLI PROBE: HCPCS | Performed by: INTERNAL MEDICINE

## 2021-09-25 LAB — SARS-COV-2 RNA RESP QL NAA+PROBE: NEGATIVE

## 2021-09-26 DIAGNOSIS — Z13.220 LIPID SCREENING: ICD-10-CM

## 2021-09-26 DIAGNOSIS — Z94.1 HEART REPLACED BY TRANSPLANT (H): Primary | ICD-10-CM

## 2021-09-26 RX ORDER — LIDOCAINE 40 MG/G
CREAM TOPICAL
Status: CANCELLED | OUTPATIENT
Start: 2021-09-26

## 2021-09-27 ENCOUNTER — APPOINTMENT (OUTPATIENT)
Dept: INTERPRETER SERVICES | Facility: CLINIC | Age: 66
End: 2021-09-27
Payer: COMMERCIAL

## 2021-09-27 ENCOUNTER — TELEPHONE (OUTPATIENT)
Dept: CARDIOLOGY | Facility: CLINIC | Age: 66
End: 2021-09-27

## 2021-09-27 LAB — OXYHGB MFR BLDV: 72 % (ref 92–100)

## 2021-09-28 ENCOUNTER — APPOINTMENT (OUTPATIENT)
Dept: MEDSURG UNIT | Facility: CLINIC | Age: 66
End: 2021-09-28
Attending: INTERNAL MEDICINE
Payer: COMMERCIAL

## 2021-09-28 ENCOUNTER — HOSPITAL ENCOUNTER (OUTPATIENT)
Facility: CLINIC | Age: 66
Discharge: HOME | End: 2021-09-28
Attending: INTERNAL MEDICINE | Admitting: INTERNAL MEDICINE
Payer: COMMERCIAL

## 2021-09-28 ENCOUNTER — APPOINTMENT (OUTPATIENT)
Dept: LAB | Facility: CLINIC | Age: 66
End: 2021-09-28
Attending: INTERNAL MEDICINE
Payer: COMMERCIAL

## 2021-09-28 VITALS
TEMPERATURE: 98.5 F | DIASTOLIC BLOOD PRESSURE: 91 MMHG | RESPIRATION RATE: 18 BRPM | HEART RATE: 92 BPM | OXYGEN SATURATION: 98 % | BODY MASS INDEX: 34.2 KG/M2 | HEIGHT: 63 IN | SYSTOLIC BLOOD PRESSURE: 161 MMHG | WEIGHT: 193 LBS

## 2021-09-28 DIAGNOSIS — Z13.220 LIPID SCREENING: ICD-10-CM

## 2021-09-28 DIAGNOSIS — Z94.1 HEART REPLACED BY TRANSPLANT (H): ICD-10-CM

## 2021-09-28 LAB
ALBUMIN UR-MCNC: 10 MG/DL
ANION GAP SERPL CALCULATED.3IONS-SCNC: 5 MMOL/L (ref 3–14)
APPEARANCE UR: CLEAR
BASOPHILS # BLD AUTO: 0 10E3/UL (ref 0–0.2)
BASOPHILS NFR BLD AUTO: 0 %
BILIRUB UR QL STRIP: NEGATIVE
BUN SERPL-MCNC: 32 MG/DL (ref 7–30)
CALCIUM SERPL-MCNC: 8.5 MG/DL (ref 8.5–10.1)
CHLORIDE BLD-SCNC: 108 MMOL/L (ref 94–109)
CHOLEST SERPL-MCNC: 151 MG/DL
CO2 SERPL-SCNC: 27 MMOL/L (ref 20–32)
COLOR UR AUTO: ABNORMAL
CREAT SERPL-MCNC: 2.06 MG/DL (ref 0.66–1.25)
EOSINOPHIL # BLD AUTO: 0.1 10E3/UL (ref 0–0.7)
EOSINOPHIL NFR BLD AUTO: 2 %
ERYTHROCYTE [DISTWIDTH] IN BLOOD BY AUTOMATED COUNT: 12.6 % (ref 10–15)
FASTING STATUS PATIENT QL REPORTED: YES
GFR SERPL CREATININE-BSD FRML MDRD: 32 ML/MIN/1.73M2
GLUCOSE BLD-MCNC: 173 MG/DL (ref 70–99)
GLUCOSE UR STRIP-MCNC: 50 MG/DL
HCT VFR BLD AUTO: 38.3 % (ref 40–53)
HDLC SERPL-MCNC: 37 MG/DL
HGB BLD-MCNC: 12.4 G/DL (ref 13.3–17.7)
HGB BLD-MCNC: 13 G/DL (ref 13.3–17.7)
HGB UR QL STRIP: NEGATIVE
IMM GRANULOCYTES # BLD: 0 10E3/UL
IMM GRANULOCYTES NFR BLD: 1 %
KETONES UR STRIP-MCNC: NEGATIVE MG/DL
LDLC SERPL CALC-MCNC: 58 MG/DL
LEUKOCYTE ESTERASE UR QL STRIP: NEGATIVE
LYMPHOCYTES # BLD AUTO: 0.8 10E3/UL (ref 0.8–5.3)
LYMPHOCYTES NFR BLD AUTO: 20 %
MAGNESIUM SERPL-MCNC: 1.8 MG/DL (ref 1.6–2.3)
MCH RBC QN AUTO: 30 PG (ref 26.5–33)
MCHC RBC AUTO-ENTMCNC: 32.4 G/DL (ref 31.5–36.5)
MCV RBC AUTO: 93 FL (ref 78–100)
MONOCYTES # BLD AUTO: 0.4 10E3/UL (ref 0–1.3)
MONOCYTES NFR BLD AUTO: 11 %
MUCOUS THREADS #/AREA URNS LPF: PRESENT /LPF
NEUTROPHILS # BLD AUTO: 2.7 10E3/UL (ref 1.6–8.3)
NEUTROPHILS NFR BLD AUTO: 66 %
NITRATE UR QL: NEGATIVE
NONHDLC SERPL-MCNC: 114 MG/DL
NRBC # BLD AUTO: 0 10E3/UL
NRBC BLD AUTO-RTO: 0 /100
OXYHGB MFR BLDV: 71 % (ref 92–100)
PH UR STRIP: 6.5 [PH] (ref 5–7)
PHOSPHATE SERPL-MCNC: 2.6 MG/DL (ref 2.5–4.5)
PLATELET # BLD AUTO: 152 10E3/UL (ref 150–450)
POTASSIUM BLD-SCNC: 4.2 MMOL/L (ref 3.4–5.3)
RBC # BLD AUTO: 4.13 10E6/UL (ref 4.4–5.9)
RBC URINE: 0 /HPF
SODIUM SERPL-SCNC: 140 MMOL/L (ref 133–144)
SP GR UR STRIP: 1.02 (ref 1–1.03)
SPERM #/AREA URNS HPF: PRESENT /HPF
SQUAMOUS EPITHELIAL: <1 /HPF
TACROLIMUS BLD-MCNC: 5.8 UG/L (ref 5–15)
TME LAST DOSE: NORMAL H
TME LAST DOSE: NORMAL H
TRIGL SERPL-MCNC: 278 MG/DL
UROBILINOGEN UR STRIP-MCNC: NORMAL MG/DL
WBC # BLD AUTO: 4.1 10E3/UL (ref 4–11)
WBC URINE: 0 /HPF

## 2021-09-28 PROCEDURE — 36415 COLL VENOUS BLD VENIPUNCTURE: CPT | Performed by: INTERNAL MEDICINE

## 2021-09-28 PROCEDURE — 88346 IMFLUOR 1ST 1ANTB STAIN PX: CPT | Mod: TC | Performed by: INTERNAL MEDICINE

## 2021-09-28 PROCEDURE — 84100 ASSAY OF PHOSPHORUS: CPT | Performed by: INTERNAL MEDICINE

## 2021-09-28 PROCEDURE — 250N000009 HC RX 250: Performed by: INTERNAL MEDICINE

## 2021-09-28 PROCEDURE — 80197 ASSAY OF TACROLIMUS: CPT | Performed by: INTERNAL MEDICINE

## 2021-09-28 PROCEDURE — 82810 BLOOD GASES O2 SAT ONLY: CPT

## 2021-09-28 PROCEDURE — 999N000132 HC STATISTIC PP CARE STAGE 1

## 2021-09-28 PROCEDURE — 80048 BASIC METABOLIC PNL TOTAL CA: CPT | Performed by: INTERNAL MEDICINE

## 2021-09-28 PROCEDURE — 82962 GLUCOSE BLOOD TEST: CPT

## 2021-09-28 PROCEDURE — 81001 URINALYSIS AUTO W/SCOPE: CPT | Performed by: INTERNAL MEDICINE

## 2021-09-28 PROCEDURE — 83735 ASSAY OF MAGNESIUM: CPT | Performed by: INTERNAL MEDICINE

## 2021-09-28 PROCEDURE — 93505 ENDOMYOCARDIAL BIOPSY: CPT | Performed by: INTERNAL MEDICINE

## 2021-09-28 PROCEDURE — C1894 INTRO/SHEATH, NON-LASER: HCPCS | Performed by: INTERNAL MEDICINE

## 2021-09-28 PROCEDURE — 85004 AUTOMATED DIFF WBC COUNT: CPT | Performed by: INTERNAL MEDICINE

## 2021-09-28 PROCEDURE — 272N000001 HC OR GENERAL SUPPLY STERILE: Performed by: INTERNAL MEDICINE

## 2021-09-28 PROCEDURE — 85025 COMPLETE CBC W/AUTO DIFF WBC: CPT

## 2021-09-28 PROCEDURE — 80061 LIPID PANEL: CPT | Performed by: INTERNAL MEDICINE

## 2021-09-28 RX ORDER — LIDOCAINE 40 MG/G
CREAM TOPICAL
Status: COMPLETED | OUTPATIENT
Start: 2021-09-28 | End: 2021-09-28

## 2021-09-28 RX ORDER — GLIMEPIRIDE 2 MG/1
TABLET ORAL
Qty: 270 TABLET | OUTPATIENT
Start: 2021-09-28

## 2021-09-28 RX ADMIN — LIDOCAINE: 40 CREAM TOPICAL at 09:51

## 2021-09-28 ASSESSMENT — MIFFLIN-ST. JEOR: SCORE: 1545.57

## 2021-09-28 NOTE — PROGRESS NOTES
[facility]   Interventional Cardiology        Consenting/Education for Right Heart Catheterization/Endomyocardial Biopsy     Patient understands as a part of routine surveillance after heart transplant, we would like to perform a right heart catheterization/endomyocardial biopsy.  This procedure will be performed by the interventional cardiologist.    Patient understands during the portion for right heart catheterization a fine tube (catheter) is put into the vein of the groin/neck.  It is carefully passed along until it reaches the heart and then goes up into the blood vessels of the lungs. This is done to measure a variety of pressures in your heart and can tell us how well the heart is filling and emptying, as well as monitor fluid status. While the catheter is in your neck/groin vein, a  Biopsy forceps  or pincers at the end of the catheter are used to take the tissue samples. This is may be repeated to get enough samples. The biopsy pieces are very small (one to two millimeters).     Patient also understands risks and complications of the procedure which include, but are not limited to bruising/swelling around the incision site, infection, bleeding, allergic reaction to local anesthetic.      Patient verbalized understanding of risks and benefits of the right heart catheterization and endomyocardial biopsy and has elected to proceed with the procedure.       Mary Lou NAVA, YVON  [facility] Interventional Cardiology  [phone]

## 2021-09-28 NOTE — PROGRESS NOTES
Returned from [facility] to 2A post procedure at 1040.  Patient tolerated recovery stage well. VSS, right neck site clean/dry/intact, no hematoma, and denies pain. Patient tolerated PO  fluids. Teaching was done and discharge instructions were given.   Patient discharged from the hospital via ambulatory to home with his wife.

## 2021-09-28 NOTE — PROGRESS NOTES
Prepped for RH/Bx.  Denies pain.  Wife waiting in room with him.  H & P from July/2021, no sedation planned.  Consent signed.  Lab unable to collect blood, will draw from venous sheath during procedure.

## 2021-09-28 NOTE — DISCHARGE INSTRUCTIONS
[facility]                        Interventional Cardiology  Discharge Instructions   Post Right Heart Cath      AFTER YOU GO HOME:    DO drink plenty of fluids    DO resume your regular diet and medications unless otherwise instructed by your Primary Physician    Do Not scrub the procedure site vigorously    No lotion or powder to the puncture site for 3 days    CALL YOUR PRIMARY PHYSICIAN IF: You may resume all normal activity.  Monitor neck site for bleeding, swelling, or voice changes. If you notice bleeding or swelling immediately apply pressure to the site and call number below to speak with Cardiology Fellow.  If you experience any changes in your breathing you should call your doctor immediately or come to the closest Emergency Department.  Do not drive yourself.    ADDITIONAL INSTRUCTIONS: Medications: You are to resume all home medications including anticoagulation therapy unless otherwise advised by your primary cardiologist or nurse coordinator.    Follow Up: Per your primary cardiology team    If you have any questions or concerns regarding your procedure site please call [phone] at anytime and ask for Cardiology Fellow on call.  They are available 24 hours a day.  You may also contact the Cardiology Clinic after hours number at [phone].                                                       Telephone Numbers [phone] Monday-Friday 8:00 am to 4:30 pm    [phone] After 4:30 pm Monday-Friday, Weekends & Holidays  Ask for Interventional Cardiologist on call. Someone is on call 24 hours/day   [facility] toll free number [phone] Monday-Friday 8:00 am to 4:30 pm   [facility] Emergency Dept [phone]

## 2021-09-29 ENCOUNTER — APPOINTMENT (OUTPATIENT)
Dept: INTERPRETER SERVICES | Facility: CLINIC | Age: 66
End: 2021-09-29
Payer: COMMERCIAL

## 2021-09-29 LAB
PATH REPORT.COMMENTS IMP SPEC: NORMAL
PATH REPORT.FINAL DX SPEC: NORMAL
PATH REPORT.GROSS SPEC: NORMAL
PATH REPORT.MICROSCOPIC SPEC OTHER STN: NORMAL
PATH REPORT.RELEVANT HX SPEC: NORMAL
PHOTO IMAGE: NORMAL

## 2021-09-29 PROCEDURE — 88350 IMFLUOR EA ADDL 1ANTB STN PX: CPT | Mod: 26 | Performed by: PATHOLOGY

## 2021-09-29 PROCEDURE — 88307 TISSUE EXAM BY PATHOLOGIST: CPT | Mod: 26 | Performed by: PATHOLOGY

## 2021-09-29 PROCEDURE — 88346 IMFLUOR 1ST 1ANTB STAIN PX: CPT | Mod: 26 | Performed by: PATHOLOGY

## 2021-09-29 NOTE — TELEPHONE ENCOUNTER
Pharmacy notified that he isn't taking this to stop refill requests.  Confirmed with patient it has been stopped. Jeimy Casas RN on 9/29/2021 at 10:42 AM

## 2021-10-01 ENCOUNTER — TELEPHONE (OUTPATIENT)
Dept: TRANSPLANT | Facility: CLINIC | Age: 66
End: 2021-10-01

## 2021-10-01 ENCOUNTER — APPOINTMENT (OUTPATIENT)
Dept: INTERPRETER SERVICES | Facility: CLINIC | Age: 66
End: 2021-10-01
Payer: COMMERCIAL

## 2021-10-01 DIAGNOSIS — Z94.1 HEART TRANSPLANT, ORTHOTOPIC, STATUS (H): ICD-10-CM

## 2021-10-01 RX ORDER — TACROLIMUS 1 MG/1
CAPSULE ORAL
Qty: 270 CAPSULE | Refills: 11 | Status: SHIPPED | OUTPATIENT
Start: 2021-10-01 | End: 2021-10-08

## 2021-10-01 NOTE — TELEPHONE ENCOUNTER
Results called to patient with Thai interpretor.  -normal RHC  -HBx negative, NER  -Cr 2.06  -Tacro level 5.8 (goal 4-6), confirmed ~14 hour trough and current dose 5 mg bid. Dose decreased to 5/4 and patient to recheck level late next week. Lab appt set for 10/8 at 030 at the [facility]  -triglycerides 278 (fasting), continue to monitor, discussed exercise/diet, relayed to Primary Txp Coordinator    Patient verbalized understanding and next steps.     LM for daughter today with update.

## 2021-10-04 ENCOUNTER — APPOINTMENT (OUTPATIENT)
Dept: INTERPRETER SERVICES | Facility: CLINIC | Age: 66
End: 2021-10-04
Payer: COMMERCIAL

## 2021-10-05 LAB — GLUCOSE BLDC GLUCOMTR-MCNC: 185 MG/DL (ref 70–99)

## 2021-10-08 ENCOUNTER — LAB (OUTPATIENT)
Dept: LAB | Facility: CLINIC | Age: 66
End: 2021-10-08
Payer: COMMERCIAL

## 2021-10-08 ENCOUNTER — TELEPHONE (OUTPATIENT)
Dept: TRANSPLANT | Facility: CLINIC | Age: 66
End: 2021-10-08

## 2021-10-08 DIAGNOSIS — Z94.1 HEART TRANSPLANT, ORTHOTOPIC, STATUS (H): ICD-10-CM

## 2021-10-08 DIAGNOSIS — Z94.1 HEART TRANSPLANT, ORTHOTOPIC, STATUS (H): Primary | ICD-10-CM

## 2021-10-08 DIAGNOSIS — Z94.1 HEART REPLACED BY TRANSPLANT (H): ICD-10-CM

## 2021-10-08 LAB
ANION GAP SERPL CALCULATED.3IONS-SCNC: 11 MMOL/L (ref 3–14)
BUN SERPL-MCNC: 27 MG/DL (ref 7–30)
CALCIUM SERPL-MCNC: 8.7 MG/DL (ref 8.5–10.1)
CHLORIDE BLD-SCNC: 106 MMOL/L (ref 94–109)
CO2 SERPL-SCNC: 24 MMOL/L (ref 20–32)
CREAT SERPL-MCNC: 2.03 MG/DL (ref 0.66–1.25)
GFR SERPL CREATININE-BSD FRML MDRD: 33 ML/MIN/1.73M2
GLUCOSE BLD-MCNC: 162 MG/DL (ref 70–99)
POTASSIUM BLD-SCNC: 4.2 MMOL/L (ref 3.4–5.3)
SODIUM SERPL-SCNC: 141 MMOL/L (ref 133–144)
TACROLIMUS BLD-MCNC: 5.4 UG/L (ref 5–15)
TME LAST DOSE: NORMAL H
TME LAST DOSE: NORMAL H

## 2021-10-08 PROCEDURE — 80197 ASSAY OF TACROLIMUS: CPT | Performed by: INTERNAL MEDICINE

## 2021-10-08 PROCEDURE — 36415 COLL VENOUS BLD VENIPUNCTURE: CPT | Performed by: PATHOLOGY

## 2021-10-08 PROCEDURE — 80048 BASIC METABOLIC PNL TOTAL CA: CPT | Performed by: PATHOLOGY

## 2021-10-08 RX ORDER — SIROLIMUS 0.5 MG/1
2 TABLET, FILM COATED ORAL DAILY
Qty: 120 TABLET | Refills: 11 | Status: SHIPPED | OUTPATIENT
Start: 2021-10-08 | End: 2022-11-04

## 2021-10-08 RX ORDER — TACROLIMUS 1 MG/1
CAPSULE ORAL
Qty: 240 CAPSULE | Refills: 11 | Status: SHIPPED | OUTPATIENT
Start: 2021-10-08 | End: 2021-11-22

## 2021-10-08 NOTE — TELEPHONE ENCOUNTER
Pt called with lab results.  Tacro level 5.4.  Goal 3-5.  Pt to decrease dose to 4/4 and recheck in one week.  Last Sirolimus level 6.  Goal 3-5.  Pt to decrease dose to 2mg daily and recheck in one week.  Pt called using  and message also left with pt's daughter Elisabeth.  Pt able to repeat back instructions to coordinator.

## 2021-10-15 ENCOUNTER — LAB (OUTPATIENT)
Dept: LAB | Facility: CLINIC | Age: 66
End: 2021-10-15

## 2021-10-15 ENCOUNTER — LAB (OUTPATIENT)
Dept: LAB | Facility: CLINIC | Age: 66
End: 2021-10-15
Payer: COMMERCIAL

## 2021-10-15 DIAGNOSIS — Z94.1 HEART TRANSPLANT, ORTHOTOPIC, STATUS (H): Primary | ICD-10-CM

## 2021-10-15 DIAGNOSIS — E11.65 TYPE 2 DIABETES MELLITUS WITH HYPERGLYCEMIA, WITH LONG-TERM CURRENT USE OF INSULIN (H): ICD-10-CM

## 2021-10-15 DIAGNOSIS — Z94.1 HEART TRANSPLANT, ORTHOTOPIC, STATUS (H): ICD-10-CM

## 2021-10-15 DIAGNOSIS — Z79.4 TYPE 2 DIABETES MELLITUS WITH HYPERGLYCEMIA, WITH LONG-TERM CURRENT USE OF INSULIN (H): ICD-10-CM

## 2021-10-15 LAB
ANION GAP SERPL CALCULATED.3IONS-SCNC: 7 MMOL/L (ref 3–14)
BUN SERPL-MCNC: 24 MG/DL (ref 7–30)
CALCIUM SERPL-MCNC: 8.8 MG/DL (ref 8.5–10.1)
CHLORIDE BLD-SCNC: 107 MMOL/L (ref 94–109)
CO2 SERPL-SCNC: 26 MMOL/L (ref 20–32)
CREAT SERPL-MCNC: 1.98 MG/DL (ref 0.66–1.25)
GFR SERPL CREATININE-BSD FRML MDRD: 34 ML/MIN/1.73M2
GLUCOSE BLD-MCNC: 215 MG/DL (ref 70–99)
HBA1C MFR BLD: 7.8 % (ref 0–5.6)
POTASSIUM BLD-SCNC: 4.2 MMOL/L (ref 3.4–5.3)
SIROLIMUS BLD-MCNC: 5.3 UG/L (ref 5–15)
SODIUM SERPL-SCNC: 140 MMOL/L (ref 133–144)
TACROLIMUS BLD-MCNC: 4.4 UG/L (ref 5–15)
TME LAST DOSE: ABNORMAL H
TME LAST DOSE: ABNORMAL H
TME LAST DOSE: NORMAL H
TME LAST DOSE: NORMAL H

## 2021-10-15 PROCEDURE — 36415 COLL VENOUS BLD VENIPUNCTURE: CPT | Performed by: PATHOLOGY

## 2021-10-15 PROCEDURE — 80197 ASSAY OF TACROLIMUS: CPT | Performed by: INTERNAL MEDICINE

## 2021-10-15 PROCEDURE — 83036 HEMOGLOBIN GLYCOSYLATED A1C: CPT | Performed by: PATHOLOGY

## 2021-10-15 PROCEDURE — 86352 CELL FUNCTION ASSAY W/STIM: CPT | Performed by: INTERNAL MEDICINE

## 2021-10-15 PROCEDURE — 80048 BASIC METABOLIC PNL TOTAL CA: CPT | Performed by: PATHOLOGY

## 2021-10-15 PROCEDURE — 80195 ASSAY OF SIROLIMUS: CPT | Performed by: INTERNAL MEDICINE

## 2021-10-18 ENCOUNTER — TELEPHONE (OUTPATIENT)
Dept: TRANSPLANT | Facility: CLINIC | Age: 66
End: 2021-10-18

## 2021-10-18 LAB — IMMUKNOW IMMUNE CELL FUNCTION: 325 NG/ML

## 2021-10-18 NOTE — TELEPHONE ENCOUNTER
Pt called with lab work from Friday.  Tacro and Rapa levels are both at goal.  No dose changes needed at this time.  Plan to recheck at appointments in 2 months.  Pt called using , pt states understanding.

## 2021-10-19 ENCOUNTER — OFFICE VISIT (OUTPATIENT)
Dept: ENDOCRINOLOGY | Facility: CLINIC | Age: 66
End: 2021-10-19
Payer: COMMERCIAL

## 2021-10-19 VITALS
HEART RATE: 88 BPM | SYSTOLIC BLOOD PRESSURE: 158 MMHG | BODY MASS INDEX: 33.2 KG/M2 | DIASTOLIC BLOOD PRESSURE: 83 MMHG | WEIGHT: 199.3 LBS | HEIGHT: 65 IN

## 2021-10-19 DIAGNOSIS — N18.4 CKD (CHRONIC KIDNEY DISEASE) STAGE 4, GFR 15-29 ML/MIN (H): ICD-10-CM

## 2021-10-19 DIAGNOSIS — Z79.4 TYPE 2 DIABETES MELLITUS WITH HYPERGLYCEMIA, WITH LONG-TERM CURRENT USE OF INSULIN (H): Primary | ICD-10-CM

## 2021-10-19 DIAGNOSIS — E66.01 MORBID OBESITY (H): ICD-10-CM

## 2021-10-19 DIAGNOSIS — E11.65 TYPE 2 DIABETES MELLITUS WITH HYPERGLYCEMIA, WITH LONG-TERM CURRENT USE OF INSULIN (H): Primary | ICD-10-CM

## 2021-10-19 DIAGNOSIS — Z94.1 HEART TRANSPLANT, ORTHOTOPIC, STATUS (H): ICD-10-CM

## 2021-10-19 PROCEDURE — 99215 OFFICE O/P EST HI 40 MIN: CPT | Performed by: PHYSICIAN ASSISTANT

## 2021-10-19 RX ORDER — DULAGLUTIDE 0.75 MG/.5ML
0.75 INJECTION, SOLUTION SUBCUTANEOUS
Qty: 7 ML | Refills: 0 | Status: SHIPPED | OUTPATIENT
Start: 2021-10-19 | End: 2021-12-21 | Stop reason: DRUGHIGH

## 2021-10-19 ASSESSMENT — MIFFLIN-ST. JEOR: SCORE: 1600.9

## 2021-10-19 ASSESSMENT — PAIN SCALES - GENERAL: PAINLEVEL: NO PAIN (0)

## 2021-10-19 NOTE — PROGRESS NOTES
Diabetes Virtual Consult Note  Marisabel Prieto PA-C  October 19, 2021        Lucian Dalearza Sr. is a 66 year old male with a past medical history including  has a past medical history of CAD (coronary artery disease), CHF (congestive heart failure) (H), CKD (chronic kidney disease), stage III, Cortical cataract of both eyes, Diabetes (H), Hyperlipidemia, Hypertension, Infection due to Streptococcus mitis group (09/23/2020), Ischemic cardiomyopathy, Obesity, CHANTEL (obstructive sleep apnea), and Osteoarthritis. He also has no past medical history of Chronic infection, Complication of anesthesia, COPD (chronic obstructive pulmonary disease) (H), Heart murmur, Irregular heart beat, Liver disease, Other chronic pain, or Uncomplicated asthma.  He is  s/p heart transplant on 7/19/2020 and left eye retinectomy on 12/21/20.    I last saw Lucian and Shivani with video visit in Feb 2021.  At that time BG was reasonably controlled early in the morning but with BG later in the day above goal.  I suspect some of this is due to varying NPH dose in the morning.      Advised:  NPH 35 units every morning regardless of fasting BG to prevent higher BG later and 11 units each evening.  Increase evening dose if fasting ALWAYS >105.    Continue 15 units Novolog with lunch + sliding scale insulin 2u:30 >140  With dinner give Novolog 10 u  - 200, 12 for  - 229, or 14u for BG >230.    Shivani able to read back and verbalize dosing.      RTC 2 months, sooner prn. , urged to contact me sooner of frequent BG >200, any BG<70.        Interim:  He has not been hospitalized.  He did see ophtha 7/21/2021.  Did PRP an Avastatin and discussed surgical options for right eye which has hx vitreous hemorrhage.      Today:      Auto cover needles do not work, needs other.    BG always high when awakes and then again mostly at midday and then high at night thus giving Novolog tid now. Once he was low with 58 after he was high at dinner  time and so Shivani gave sliding scale insulin and he did not eat he then began to sweat and knew he was low 3-4 hours later.  They have both gained about 20 lbs.  Were doing better going to gym daily but quit due to COVID resurgence.  He really enjoys walking outside but doesn't when rains or snowing and having hard time transitioning to indoor equipment. Appetite better, eating more, too much.      They both deny any personal or FH of pancreatitis, thyroid cancer, MENS for Lucian.      Breakfast 9:30 am.    Current DM Regimen:    NPH 35 u qam around 8:30 or 9 am, 11 u qpm at 9 pm.  Novolog SSI tid. sliding scale insulin 2u:30 >140 with lunch around 1:30 or 3 pm , dinner  7 or 8 pm give Novolog 10 u  - 200, 12 for  - 229, or 14u for BG >230.        We reviewed glucometer, pump and CGMS data together.  It revealed:      Bg 97 - 184 qam, 234 - 319 4-5 pm a dinner.  204 - 289 bedtime.      Avg .    One low 58 at MN after gave sliding scale insulin  without food.                History of Diabetes monitoring and complications/ prevention:  CAD: Yes  Last eye exam results: 7/21/2021.  Did PRP an Avastatin and discussed surgical options for right eye which has hx vitreous hemorrhage.    Last dental exam: Reports UTD  Microalbuminuria: 6 October 2018  HTN: Yes  On Statin: Yes  On Aspirin: Yes  Depression: No - worried, but happy. Hopeful.    ED: yes, limited concerned at this time     Janice  has a past medical history of CAD (coronary artery disease), CHF (congestive heart failure) (H), CKD (chronic kidney disease), stage III, Cortical cataract of both eyes, Diabetes (H), Hyperlipidemia, Hypertension, Infection due to Streptococcus mitis group (09/23/2020), Ischemic cardiomyopathy, Obesity, CHANTEL (obstructive sleep apnea), and Osteoarthritis. He also has no past medical history of Chronic infection, Complication of anesthesia, COPD (chronic obstructive pulmonary disease) (H), Heart murmur, Irregular heart  beat, Liver disease, Other chronic pain, or Uncomplicated asthma.  Current Outpatient Medications   Medication     acetaminophen (TYLENOL) 325 MG tablet     Alcohol Swabs PADS     aspirin (ASA) 81 MG chewable tablet     atropine 1 % ophthalmic solution     blood glucose (NO BRAND SPECIFIED) test strip     blood glucose monitoring (ACCU-CHEK FELIPE SMARTVIEW) meter device kit     blood glucose monitoring (SOFTCLIX) lancets     calcium carbonate 600 mg-vitamin D 400 units (CALTRATE) 600-400 MG-UNIT per tablet     Calcium Carbonate-Vitamin D3 600-400 MG-UNIT TABS     Continuous Blood Gluc Sensor (FREESTYLE JEREMY 14 DAY SENSOR) MISC     Continuous Blood Gluc Sensor (FREESTYLE JEREMY 14 DAY SENSOR) MISC     furosemide (LASIX) 20 MG tablet     HUMALOG KWIKPEN 100 UNIT/ML soln     hydrALAZINE (APRESOLINE) 100 MG tablet     insulin aspart (NOVOLOG PEN) 100 UNIT/ML pen     insulin isophane human (HUMULIN N PEN) 100 UNIT/ML injection     insulin isophane human (HUMULIN N PEN) 100 UNIT/ML injection     insulin pen needle (32G X 4 MM) 32G X 4 MM miscellaneous     insulin pen needle (B-D U/F) 31G X 5 MM miscellaneous     insulin pen needle (NOVOFINE) 30G X 8 MM miscellaneous     latanoprost (XALATAN) 0.005 % ophthalmic solution     magnesium oxide 400 MG CAPS     neomycin-polymixin-dexamethasone (MAXITROL) 0.1 % ophthalmic suspension     neomycin-polymixin-dexamethasone (MAXITROL) ophthalmic ointment     order for DME     pantoprazole (PROTONIX) 40 MG EC tablet     prednisoLONE acetate (PRED FORTE) 1 % ophthalmic suspension     rosuvastatin (CRESTOR) 10 MG tablet     senna-docusate (SENOKOT-S/PERICOLACE) 8.6-50 MG tablet     sirolimus (GENERIC EQUIVALENT) 0.5 MG tablet     sulfamethoxazole-trimethoprim (BACTRIM) 400-80 MG tablet     tacrolimus (GENERIC EQUIVALENT) 1 MG capsule     tamsulosin (FLOMAX) 0.4 MG capsule     No current facility-administered medications for this visit.         Lucian's family history includes Diabetes in  "his brother, sister, and sister.    ROS:   Patient denies any fevers, chills or sweats as well as any changes in or problems with vision, pain or problems with dentition, new or different headaches.  Patient denies symptoms of hypo and hyperglycemia except as above.   Patients denies marked fatigue, cough, shortness of breath, chest pain or pressure.  There has been no pain with or other changes in urination or  itching or pain in genital areas.  Patient denies any noted swelling in feet, ankles or otherwise, loss of sensation or pain  in feet or other areas.    Patient also denies current difficulties with depressed mood, anhedonia or worrying too much.  He is tired of COVID precautions, but feels god and optimistic.        Exam:      Wt Readings from Last 10 Encounters:   10/19/21 90.4 kg (199 lb 4.8 oz)   09/28/21 87.5 kg (193 lb)   07/20/21 84.8 kg (187 lb)   07/19/21 86.2 kg (190 lb)   05/11/21 82.5 kg (181 lb 14.4 oz)   03/02/21 81.2 kg (179 lb)   01/05/21 82.4 kg (181 lb 9.6 oz)   12/21/20 82 kg (180 lb 12.4 oz)   12/09/20 80.7 kg (178 lb)   12/07/20 80.7 kg (178 lb)         This is a warm and pleasant obese individual in no acute distress  Mood is \"good,\"  affect is appropriate,    Her eyes are clear without proptosis, with healthy appearing lens, conjunctiva and sclera.  EOMs intact.  Nares are patent.  Neck is supple without mass or JVD noted.    Respirations are easy and unlabored.  Skin is warm moist and elastic without lesions noted.  No edema is appreciated in lower extremities.  No pedal lesions are appreciated and sensation is grossly intact.        Data:      Recent Labs   Lab Test 10/15/21  0826 10/08/21  0850 09/28/21  1031 09/28/21  1031 09/08/21  0750 07/19/21  0950 08/10/20  0845 08/05/20  0843 07/07/20  1732 07/07/20  0720 10/16/18  0904 10/03/18  0725 08/16/18  0834 08/06/18  0000 04/30/18  1148 04/30/18  0000 04/27/17  0746 04/27/17  0745   A1C 7.8*  --   --   --   --  7.3*   < >  --   --   " --    < >  --   --   --   --   --    < >  --    HEMOGLOBINA1  --   --   --   --   --   --   --   --   --   --   --   --   --  8.3*  --  10.6*  --   --    TSH  --   --   --   --   --   --   --  0.80  --  1.30   < >  --   --   --   --   --   --   --    LDL  --   --   --  58  --  43   < >  --   --   --    < >  --    < >  --   --   --    < >  --    HDL  --   --   --  37*  --  36*   < >  --   --   --    < >  --    < >  --   --   --    < >  --    TRIG  --   --   --  278*  --  220*   < >  --   --   --    < >  --    < >  --   --   --    < >  --    CR 1.98* 2.03*   < > 2.06*   < > 2.12*   < > 1.70*   < >  --    < >  --    < >  --    < >  --    < >  --    MICROL  --   --   --   --   --   --   --   --   --   --   --  6  --   --   --   --   --  44    < > = values in this interval not displayed.     GFR Estimate   Date Value Ref Range Status   10/15/2021 34 (L) >60 mL/min/1.73m2 Final     Comment:     As of July 11, 2021, eGFR is calculated by the CKD-EPI creatinine equation, without race adjustment. eGFR can be influenced by muscle mass, exercise, and diet. The reported eGFR is an estimation only and is only applicable if the renal function is stable.   10/08/2021 33 (L) >60 mL/min/1.73m2 Final     Comment:     As of July 11, 2021, eGFR is calculated by the CKD-EPI creatinine equation, without race adjustment. eGFR can be influenced by muscle mass, exercise, and diet. The reported eGFR is an estimation only and is only applicable if the renal function is stable.   09/28/2021 32 (L) >60 mL/min/1.73m2 Final     Comment:     As of July 11, 2021, eGFR is calculated by the CKD-EPI creatinine equation, without race adjustment. eGFR can be influenced by muscle mass, exercise, and diet. The reported eGFR is an estimation only and is only applicable if the renal function is stable.   09/21/2021 39 (L) >60 mL/min/1.73m2 Final     Comment:     As of July 11, 2021, eGFR is calculated by the CKD-EPI creatinine equation, without race  adjustment. eGFR can be influenced by muscle mass, exercise, and diet. The reported eGFR is an estimation only and is only applicable if the renal function is stable.   09/13/2021 37 (L) >60 mL/min/1.73m2 Final     Comment:     As of July 11, 2021, eGFR is calculated by the CKD-EPI creatinine equation, without race adjustment. eGFR can be influenced by muscle mass, exercise, and diet. The reported eGFR is an estimation only and is only applicable if the renal function is stable.   05/11/2021 37 (L) >60 mL/min/[1.73_m2] Final     Comment:     Non  GFR Calc  Starting 12/18/2018, serum creatinine based estimated GFR (eGFR) will be   calculated using the Chronic Kidney Disease Epidemiology Collaboration   (CKD-EPI) equation.     03/02/2021 37 (L) >60 mL/min/[1.73_m2] Final     Comment:     Non  GFR Calc  Starting 12/18/2018, serum creatinine based estimated GFR (eGFR) will be   calculated using the Chronic Kidney Disease Epidemiology Collaboration   (CKD-EPI) equation.     01/14/2021 41 (L) >60 mL/min/[1.73_m2] Final     Comment:     Non  GFR Calc  Starting 12/18/2018, serum creatinine based estimated GFR (eGFR) will be   calculated using the Chronic Kidney Disease Epidemiology Collaboration   (CKD-EPI) equation.     01/05/2021 41 (L) >60 mL/min/[1.73_m2] Final     Comment:     Non  GFR Calc  Starting 12/18/2018, serum creatinine based estimated GFR (eGFR) will be   calculated using the Chronic Kidney Disease Epidemiology Collaboration   (CKD-EPI) equation.     12/09/2020 35 (L) >60 mL/min/[1.73_m2] Final     Comment:     Non  GFR Calc  Starting 12/18/2018, serum creatinine based estimated GFR (eGFR) will be   calculated using the Chronic Kidney Disease Epidemiology Collaboration   (CKD-EPI) equation.               Assessment/Plan:    Lucian is a 65 year old male with  has a past medical history of CAD (coronary artery disease), CHF  (congestive heart failure) (H), CKD (chronic kidney disease), stage III, Cortical cataract of both eyes, Diabetes (H), Hyperlipidemia, Hypertension, Infection due to Streptococcus mitis group (09/23/2020), Ischemic cardiomyopathy, Obesity, CHANTEL (obstructive sleep apnea), and Osteoarthritis. He also has no past medical history of Chronic infection, Complication of anesthesia, COPD (chronic obstructive pulmonary disease) (H), Heart murmur, Irregular heart beat, Liver disease, Other chronic pain, or Uncomplicated asthma.  He is  s/p heart transplant on 7/19/2020 and left eye retinectomy on 12/21/20.    Lucian has type 2 diabetes complicated by ICM and heart transplant, CKD, obesity, retinopathy that is reasonably controlled early in the morning but with BG later in the day remains far above goal.    Advised:  Increase NPH to 40 qam, 12 qpm.  R/B of Trulicity reviewed and will start 0.75 mg.  RTc 2 months consider increasing dose.      RTC 2 months, sooner prn. , urged to contact me sooner of frequent BG >200, any BG<70.      43 minutes in preparation for visit reviewing chart, labs and documentation, visiting with patient gathering history and in exam from both patient and spouse, education and counseling, as well as coordination of care, further chart review and documentation following visit on this date of service and as alluded to documented above.        It is my privilege to be involved in the care of the above patient.     Marisabel Prieto PA-C, MPAS  [facility]  Diabetes, Endocrinology, and Metabolism  [phone] Appointments/Nurse  [phone] pager  [phone] nurse line    This note was completed in part using Dragon voice recognition, and may contain word and grammatical errors.

## 2021-10-19 NOTE — PATIENT INSTRUCTIONS
Estimado Lucian.    Empioece Trulivcity 0.75 mg cada semana.      Por favor sube a 12 unidades NPH en la noche, sube a 40 de maniana.        Metas:  En ayunas:90 - 140    Antes del almuerzo y jesusita o meriuenda: 90 - 140    Al dormir: 130 - 200.    Si es muy diferente, me llame o avisa con My Chart.      Atentamente,      Marisabel Prieto PA-C, MPAS  [facility]  Diabetes, Endocrinology, and Metabolism  [phone] Appointments/Nurse  [phone] Fax  [phone] URGENTafter hours/weekend Endocrinologist on call        We appreciate your assistance in coordinating your healthcare.     Please upload your insulin pump, blood sugar meter and/or continuous glucose monitor at home 1-2 days before your next diabetes-related appointment.   This will allow your provider to review your  data before your scheduled virtual visit.    To ask a question to your Endocrine care team, please send them a Heap message, or reach them by phone at [phone]     To expedite your medication refill(s), please contact your pharmacy and have them   fax a refill request to: [phone].  *Please allow 3 business days for routine medication refills.  *Please allow 5 business days for controlled substance medication refills.    For after-hours urgent Endocrine issues, that do not require 911, please dial [phone], and ask to speak with the Endocrinologist On-Call

## 2021-10-19 NOTE — LETTER
10/19/2021       RE: Lucian Mar Sr.  [address] [facility] [address]     Dear Colleague,    Thank you for referring your patient, Lucian Mar Sr., to the [facility]OCRINOLOG[facility] [place] at [facility]. Please see a copy of my visit note below.          Diabetes Virtual Consult Note  Marisabel Prieto PA-C  October 19, 2021        Lucian Henderson Sr. is a 66 year old male with a past medical history including  has a past medical history of CAD (coronary artery disease), CHF (congestive heart failure) (H), CKD (chronic kidney disease), stage III, Cortical cataract of both eyes, Diabetes (H), Hyperlipidemia, Hypertension, Infection due to Streptococcus mitis group (09/23/2020), Ischemic cardiomyopathy, Obesity, CHANTEL (obstructive sleep apnea), and Osteoarthritis. He also has no past medical history of Chronic infection, Complication of anesthesia, COPD (chronic obstructive pulmonary disease) (H), Heart murmur, Irregular heart beat, Liver disease, Other chronic pain, or Uncomplicated asthma.  He is  s/p heart transplant on 7/19/2020 and left eye retinectomy on 12/21/20.    I last saw Lucian and Shivani with video visit in Feb 2021.  At that time BG was reasonably controlled early in the morning but with BG later in the day above goal.  I suspect some of this is due to varying NPH dose in the morning.      Advised:  NPH 35 units every morning regardless of fasting BG to prevent higher BG later and 11 units each evening.  Increase evening dose if fasting ALWAYS >105.    Continue 15 units Novolog with lunch + sliding scale insulin 2u:30 >140  With dinner give Novolog 10 u  - 200, 12 for  - 229, or 14u for BG >230.    Shivani able to read back and verbalize dosing.      RTC 2 months, sooner prn. , urged to contact me sooner of frequent BG >200, any BG<70.        Interim:  He has not been hospitalized.  He did see  ophtha 7/21/2021.  Did PRP an Avastatin and discussed surgical options for right eye which has hx vitreous hemorrhage.      Today:      Auto cover needles do not work, needs other.    BG always high when awakes and then again mostly at midday and then high at night thus giving Novolog tid now. Once he was low with 58 after he was high at dinner time and so Shivani gave sliding scale insulin and he did not eat he then began to sweat and knew he was low 3-4 hours later.  They have both gained about 20 lbs.  Were doing better going to gym daily but quit due to COVID resurgence.  He really enjoys walking outside but doesn't when rains or snowing and having hard time transitioning to indoor equipment. Appetite better, eating more, too much.      They both deny any personal or FH of pancreatitis, thyroid cancer, MENS for Lucian.      Breakfast 9:30 am.    Current DM Regimen:    NPH 35 u qam around 8:30 or 9 am, 11 u qpm at 9 pm.  Novolog SSI tid. sliding scale insulin 2u:30 >140 with lunch around 1:30 or 3 pm , dinner  7 or 8 pm give Novolog 10 u  - 200, 12 for  - 229, or 14u for BG >230.        We reviewed glucometer, pump and CGMS data together.  It revealed:      Bg 97 - 184 qam, 234 - 319 4-5 pm a dinner.  204 - 289 bedtime.      Avg .    One low 58 at MN after gave sliding scale insulin  without food.                History of Diabetes monitoring and complications/ prevention:  CAD: Yes  Last eye exam results: 7/21/2021.  Did PRP an Avastatin and discussed surgical options for right eye which has hx vitreous hemorrhage.    Last dental exam: Reports UTD  Microalbuminuria: 6 October 2018  HTN: Yes  On Statin: Yes  On Aspirin: Yes  Depression: No - worried, but happy. Hopeful.    ED: yes, limited concerned at this time     Janice  has a past medical history of CAD (coronary artery disease), CHF (congestive heart failure) (H), CKD (chronic kidney disease), stage III, Cortical cataract of both eyes,  Diabetes (H), Hyperlipidemia, Hypertension, Infection due to Streptococcus mitis group (09/23/2020), Ischemic cardiomyopathy, Obesity, CHANTEL (obstructive sleep apnea), and Osteoarthritis. He also has no past medical history of Chronic infection, Complication of anesthesia, COPD (chronic obstructive pulmonary disease) (H), Heart murmur, Irregular heart beat, Liver disease, Other chronic pain, or Uncomplicated asthma.  Current Outpatient Medications   Medication     acetaminophen (TYLENOL) 325 MG tablet     Alcohol Swabs PADS     aspirin (ASA) 81 MG chewable tablet     atropine 1 % ophthalmic solution     blood glucose (NO BRAND SPECIFIED) test strip     blood glucose monitoring (ACCU-CHEK FELIPE SMARTVIEW) meter device kit     blood glucose monitoring (SOFTCLIX) lancets     calcium carbonate 600 mg-vitamin D 400 units (CALTRATE) 600-400 MG-UNIT per tablet     Calcium Carbonate-Vitamin D3 600-400 MG-UNIT TABS     Continuous Blood Gluc Sensor (FREESTYLE JEREMY 14 DAY SENSOR) MISC     Continuous Blood Gluc Sensor (FREESTYLE JEREMY 14 DAY SENSOR) MISC     furosemide (LASIX) 20 MG tablet     HUMALOG KWIKPEN 100 UNIT/ML soln     hydrALAZINE (APRESOLINE) 100 MG tablet     insulin aspart (NOVOLOG PEN) 100 UNIT/ML pen     insulin isophane human (HUMULIN N PEN) 100 UNIT/ML injection     insulin isophane human (HUMULIN N PEN) 100 UNIT/ML injection     insulin pen needle (32G X 4 MM) 32G X 4 MM miscellaneous     insulin pen needle (B-D U/F) 31G X 5 MM miscellaneous     insulin pen needle (NOVOFINE) 30G X 8 MM miscellaneous     latanoprost (XALATAN) 0.005 % ophthalmic solution     magnesium oxide 400 MG CAPS     neomycin-polymixin-dexamethasone (MAXITROL) 0.1 % ophthalmic suspension     neomycin-polymixin-dexamethasone (MAXITROL) ophthalmic ointment     order for DME     pantoprazole (PROTONIX) 40 MG EC tablet     prednisoLONE acetate (PRED FORTE) 1 % ophthalmic suspension     rosuvastatin (CRESTOR) 10 MG tablet     senna-docusate  "(SENOKOT-S/PERICOLACE) 8.6-50 MG tablet     sirolimus (GENERIC EQUIVALENT) 0.5 MG tablet     sulfamethoxazole-trimethoprim (BACTRIM) 400-80 MG tablet     tacrolimus (GENERIC EQUIVALENT) 1 MG capsule     tamsulosin (FLOMAX) 0.4 MG capsule     No current facility-administered medications for this visit.         Lucian's family history includes Diabetes in his brother, sister, and sister.    ROS:   Patient denies any fevers, chills or sweats as well as any changes in or problems with vision, pain or problems with dentition, new or different headaches.  Patient denies symptoms of hypo and hyperglycemia except as above.   Patients denies marked fatigue, cough, shortness of breath, chest pain or pressure.  There has been no pain with or other changes in urination or  itching or pain in genital areas.  Patient denies any noted swelling in feet, ankles or otherwise, loss of sensation or pain  in feet or other areas.    Patient also denies current difficulties with depressed mood, anhedonia or worrying too much.  He is tired of COVID precautions, but feels god and optimistic.        Exam:      Wt Readings from Last 10 Encounters:   10/19/21 90.4 kg (199 lb 4.8 oz)   09/28/21 87.5 kg (193 lb)   07/20/21 84.8 kg (187 lb)   07/19/21 86.2 kg (190 lb)   05/11/21 82.5 kg (181 lb 14.4 oz)   03/02/21 81.2 kg (179 lb)   01/05/21 82.4 kg (181 lb 9.6 oz)   12/21/20 82 kg (180 lb 12.4 oz)   12/09/20 80.7 kg (178 lb)   12/07/20 80.7 kg (178 lb)         This is a warm and pleasant obese individual in no acute distress  Mood is \"good,\"  affect is appropriate,    Her eyes are clear without proptosis, with healthy appearing lens, conjunctiva and sclera.  EOMs intact.  Nares are patent.  Neck is supple without mass or JVD noted.    Respirations are easy and unlabored.  Skin is warm moist and elastic without lesions noted.  No edema is appreciated in lower extremities.  No pedal lesions are appreciated and sensation is grossly intact.  "       Data:      Recent Labs   Lab Test 10/15/21  0826 10/08/21  0850 09/28/21  1031 09/28/21  1031 09/08/21  0750 07/19/21  0950 08/10/20  0845 08/05/20  0843 07/07/20  1732 07/07/20  0720 10/16/18  0904 10/03/18  0725 08/16/18  0834 08/06/18  0000 04/30/18  1148 04/30/18  0000 04/27/17  0746 04/27/17  0745   A1C 7.8*  --   --   --   --  7.3*   < >  --   --   --    < >  --   --   --   --   --    < >  --    HEMOGLOBINA1  --   --   --   --   --   --   --   --   --   --   --   --   --  8.3*  --  10.6*  --   --    TSH  --   --   --   --   --   --   --  0.80  --  1.30   < >  --   --   --   --   --   --   --    LDL  --   --   --  58  --  43   < >  --   --   --    < >  --    < >  --   --   --    < >  --    HDL  --   --   --  37*  --  36*   < >  --   --   --    < >  --    < >  --   --   --    < >  --    TRIG  --   --   --  278*  --  220*   < >  --   --   --    < >  --    < >  --   --   --    < >  --    CR 1.98* 2.03*   < > 2.06*   < > 2.12*   < > 1.70*   < >  --    < >  --    < >  --    < >  --    < >  --    MICROL  --   --   --   --   --   --   --   --   --   --   --  6  --   --   --   --   --  44    < > = values in this interval not displayed.     GFR Estimate   Date Value Ref Range Status   10/15/2021 34 (L) >60 mL/min/1.73m2 Final     Comment:     As of July 11, 2021, eGFR is calculated by the CKD-EPI creatinine equation, without race adjustment. eGFR can be influenced by muscle mass, exercise, and diet. The reported eGFR is an estimation only and is only applicable if the renal function is stable.   10/08/2021 33 (L) >60 mL/min/1.73m2 Final     Comment:     As of July 11, 2021, eGFR is calculated by the CKD-EPI creatinine equation, without race adjustment. eGFR can be influenced by muscle mass, exercise, and diet. The reported eGFR is an estimation only and is only applicable if the renal function is stable.   09/28/2021 32 (L) >60 mL/min/1.73m2 Final     Comment:     As of July 11, 2021, eGFR is calculated by the  CKD-EPI creatinine equation, without race adjustment. eGFR can be influenced by muscle mass, exercise, and diet. The reported eGFR is an estimation only and is only applicable if the renal function is stable.   09/21/2021 39 (L) >60 mL/min/1.73m2 Final     Comment:     As of July 11, 2021, eGFR is calculated by the CKD-EPI creatinine equation, without race adjustment. eGFR can be influenced by muscle mass, exercise, and diet. The reported eGFR is an estimation only and is only applicable if the renal function is stable.   09/13/2021 37 (L) >60 mL/min/1.73m2 Final     Comment:     As of July 11, 2021, eGFR is calculated by the CKD-EPI creatinine equation, without race adjustment. eGFR can be influenced by muscle mass, exercise, and diet. The reported eGFR is an estimation only and is only applicable if the renal function is stable.   05/11/2021 37 (L) >60 mL/min/[1.73_m2] Final     Comment:     Non  GFR Calc  Starting 12/18/2018, serum creatinine based estimated GFR (eGFR) will be   calculated using the Chronic Kidney Disease Epidemiology Collaboration   (CKD-EPI) equation.     03/02/2021 37 (L) >60 mL/min/[1.73_m2] Final     Comment:     Non  GFR Calc  Starting 12/18/2018, serum creatinine based estimated GFR (eGFR) will be   calculated using the Chronic Kidney Disease Epidemiology Collaboration   (CKD-EPI) equation.     01/14/2021 41 (L) >60 mL/min/[1.73_m2] Final     Comment:     Non  GFR Calc  Starting 12/18/2018, serum creatinine based estimated GFR (eGFR) will be   calculated using the Chronic Kidney Disease Epidemiology Collaboration   (CKD-EPI) equation.     01/05/2021 41 (L) >60 mL/min/[1.73_m2] Final     Comment:     Non  GFR Calc  Starting 12/18/2018, serum creatinine based estimated GFR (eGFR) will be   calculated using the Chronic Kidney Disease Epidemiology Collaboration   (CKD-EPI) equation.     12/09/2020 35 (L) >60 mL/min/[1.73_m2] Final      Comment:     Non  GFR Calc  Starting 12/18/2018, serum creatinine based estimated GFR (eGFR) will be   calculated using the Chronic Kidney Disease Epidemiology Collaboration   (CKD-EPI) equation.               Assessment/Plan:    Lucian is a 65 year old male with  has a past medical history of CAD (coronary artery disease), CHF (congestive heart failure) (H), CKD (chronic kidney disease), stage III, Cortical cataract of both eyes, Diabetes (H), Hyperlipidemia, Hypertension, Infection due to Streptococcus mitis group (09/23/2020), Ischemic cardiomyopathy, Obesity, CHANTEL (obstructive sleep apnea), and Osteoarthritis. He also has no past medical history of Chronic infection, Complication of anesthesia, COPD (chronic obstructive pulmonary disease) (H), Heart murmur, Irregular heart beat, Liver disease, Other chronic pain, or Uncomplicated asthma.  He is  s/p heart transplant on 7/19/2020 and left eye retinectomy on 12/21/20.    Lucian has type 2 diabetes complicated by ICM and heart transplant, CKD, obesity, retinopathy that is reasonably controlled early in the morning but with BG later in the day remains far above goal.    Advised:  Increase NPH to 40 qam, 12 qpm.  R/B of Trulicity reviewed and will start 0.75 mg.  RTc 2 months consider increasing dose.      RTC 2 months, sooner prn. , urged to contact me sooner of frequent BG >200, any BG<70.      43 minutes in preparation for visit reviewing chart, labs and documentation, visiting with patient gathering history and in exam from both patient and spouse, education and counseling, as well as coordination of care, further chart review and documentation following visit on this date of service and as alluded to documented above.        It is my privilege to be involved in the care of the above patient.     Marisabel Prieto PA-C, MPAS  [facility]  Diabetes, Endocrinology, and Metabolism  [phone] Appointments/Nurse  [phone]  pager  [phone] nurse line    This note was completed in part using Dragon voice recognition, and may contain word and grammatical errors.

## 2021-10-21 PROBLEM — A49.1 INFECTION DUE TO STREPTOCOCCUS MITIS GROUP: Status: ACTIVE | Noted: 2020-09-23

## 2021-10-23 ENCOUNTER — HEALTH MAINTENANCE LETTER (OUTPATIENT)
Age: 66
End: 2021-10-23

## 2021-10-26 DIAGNOSIS — Z94.1 HEART REPLACED BY TRANSPLANT (H): Primary | ICD-10-CM

## 2021-11-03 ENCOUNTER — OFFICE VISIT (OUTPATIENT)
Dept: OPHTHALMOLOGY | Facility: CLINIC | Age: 66
End: 2021-11-03
Attending: OPHTHALMOLOGY
Payer: COMMERCIAL

## 2021-11-03 DIAGNOSIS — H43.11 VITREOUS HEMORRHAGE OF RIGHT EYE (H): ICD-10-CM

## 2021-11-03 DIAGNOSIS — H40.051 BORDERLINE GLAUCOMA OF RIGHT EYE WITH OCULAR HYPERTENSION: ICD-10-CM

## 2021-11-03 DIAGNOSIS — H33.42 TRACTION DETACHMENT OF LEFT RETINA: ICD-10-CM

## 2021-11-03 DIAGNOSIS — E11.3513 TYPE 2 DIABETES MELLITUS WITH PROLIFERATIVE RETINOPATHY OF BOTH EYES AND MACULAR EDEMA, UNSPECIFIED WHETHER LONG TERM INSULIN USE (H): Primary | ICD-10-CM

## 2021-11-03 PROCEDURE — 92134 CPTRZ OPH DX IMG PST SGM RTA: CPT | Performed by: OPHTHALMOLOGY

## 2021-11-03 PROCEDURE — 250N000011 HC RX IP 250 OP 636: Performed by: OPHTHALMOLOGY

## 2021-11-03 PROCEDURE — 67028 INJECTION EYE DRUG: CPT | Mod: RT | Performed by: OPHTHALMOLOGY

## 2021-11-03 PROCEDURE — G0463 HOSPITAL OUTPT CLINIC VISIT: HCPCS | Mod: 25

## 2021-11-03 RX ORDER — LATANOPROST 50 UG/ML
SOLUTION/ DROPS OPHTHALMIC
Qty: 15 ML | Refills: 11 | Status: SHIPPED | OUTPATIENT
Start: 2021-11-03 | End: 2022-02-03

## 2021-11-03 RX ORDER — PREDNISOLONE ACETATE 10 MG/ML
1-2 SUSPENSION/ DROPS OPHTHALMIC EVERY OTHER DAY
Qty: 15 ML | Refills: 1 | Status: SHIPPED | OUTPATIENT
Start: 2021-11-03 | End: 2022-06-02

## 2021-11-03 RX ADMIN — Medication 1.25 MG: at 08:59

## 2021-11-03 ASSESSMENT — VISUAL ACUITY
OS_SC: 20/500
OD_SC+: +2
METHOD: SNELLEN - LINEAR
OD_SC: 20/50

## 2021-11-03 ASSESSMENT — CONF VISUAL FIELD
OS_INFERIOR_NASAL_RESTRICTION: 2
OS_INFERIOR_TEMPORAL_RESTRICTION: 2
OS_SUPERIOR_NASAL_RESTRICTION: 2
OD_SUPERIOR_NASAL_RESTRICTION: 3
OD_INFERIOR_TEMPORAL_RESTRICTION: 3
OD_SUPERIOR_TEMPORAL_RESTRICTION: 3
OD_INFERIOR_NASAL_RESTRICTION: 3
OS_SUPERIOR_TEMPORAL_RESTRICTION: 2

## 2021-11-03 ASSESSMENT — TONOMETRY
OD_IOP_MMHG: 13
IOP_METHOD: TONOPEN
OS_IOP_MMHG: 11

## 2021-11-03 ASSESSMENT — CUP TO DISC RATIO: OD_RATIO: 0.25

## 2021-11-03 ASSESSMENT — EXTERNAL EXAM - RIGHT EYE: OD_EXAM: NORMAL

## 2021-11-03 ASSESSMENT — REFRACTION_WEARINGRX
OD_ADD: +2.50
OS_ADD: +2.50

## 2021-11-03 ASSESSMENT — SLIT LAMP EXAM - LIDS
COMMENTS: NORMAL
COMMENTS: NORMAL

## 2021-11-03 ASSESSMENT — EXTERNAL EXAM - LEFT EYE: OS_EXAM: NORMAL

## 2021-11-03 NOTE — NURSING NOTE
Chief Complaints and History of Present Illnesses   Patient presents with     Diabetic Retinopathy Follow Up     Chief Complaint(s) and History of Present Illness(es)     Diabetic Retinopathy Follow Up     Laterality: both eyes    Onset: 2 months ago              Comments     Pt. States that VA is still blurry BE. No pain BE. No flashes or floaters BE.   Concha Garber COT 7:46 AM November 3, 2021

## 2021-11-03 NOTE — PATIENT INSTRUCTIONS
- Use Latanoprost (Green top) every night in BOTH eyes       - Use Prednisolone (White top) Every other day in JUST the LEFT eye

## 2021-11-03 NOTE — PROGRESS NOTES
CC: Follow up proliferative diabetic retinopathy and slowly clearing vitreous hemorrhage right eye   Left eye status post PPV/MP/retinectomy 12/21/20. 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 Denies eye pain.    Interval Hx: Patient reports stable but blurred eyes. Last A1c = 7.8, 10/21. No flashes of lights, no floaters. Taking latanoprost at bedtime each eye. Not using Pred forte every other day left eye.     Optical Coherence Tomography: 11-3-21  Right eye: good foveal contour; trace small cystic changes and exudates, stable.   Left eye: Irregular retina; ERM; nasal to fovea with large bullous edema, temporal with subretinal fibrosis / trace edema, Atrophic thinned retina IT macula.     FA 7/21/2021:  right eye : blockage from heme. PRP scars. Mild neovascularization elsewhere nasally    Plan:   # Proliferative Diabetic Retinopathy, each eye   # Vitreous hemorrhage, right eye -  Resolving (onset 4/2021)   - multiple Avastin (last Avastin 9/8/2021)   - status post Panretinal laser photocoagulation (PRP) filling right eye: 7/21/2021; both eyes 6/19      # Funnel RD left eye   - progressed to funnel RD after loss to follow up given heart surgeries     - w retina folded on itself under SO   - guarded prognosis discussed   - Continue PF every other day left eye    # Ocuar HTN    - Continue Latanoprost hs ou     # Visually significant cataract right eye   Consider cataract evaluation in the future    Plan   Avastin inj right eye today    Follow-up in 8 weeks for FA transit right eye, v/t/d oct mac, and possible injection Avastin right eye     Naman Lares MD - PGY3  Department of Ophthalmology  Pager: [phone]    ~~~~~~~~~~~~~~~~~~~~~~~~~~~~~~~~~~   Complete documentation of historical and exam elements from today's encounter can be found in the full encounter summary report (not reduplicated in this progress note).  I personally obtained the chief complaint(s) and history of present illness.  I  confirmed and edited as necessary the review of systems, past medical/surgical history, family history, social history, and examination findings as documented by others; and I examined the patient myself.  I personally reviewed the relevant tests, images, and reports as documented above.  I personally reviewed the ophthalmic test(s) associated with this encounter, agree with the interpretation(s) as documented by the resident/fellow, and have edited the corresponding report(s) as necessary.   I formulated and edited as necessary the assessment and plan and discussed the findings and management plan with the patient and family and No resident or fellow assisted with the procedures performed.  I performed the procedures myself.    Triny Bunch MD   of Ophthalmology.  Retina Service   Department of Ophthalmology and Visual Neurosciences   [facility]  Phone: [phone]   Fax: [phone]

## 2021-11-11 ENCOUNTER — TELEPHONE (OUTPATIENT)
Dept: TRANSPLANT | Facility: CLINIC | Age: 66
End: 2021-11-11
Payer: COMMERCIAL

## 2021-11-11 DIAGNOSIS — E11.21 TYPE 2 DIABETES MELLITUS WITH DIABETIC NEPHROPATHY, WITH LONG-TERM CURRENT USE OF INSULIN (H): ICD-10-CM

## 2021-11-11 DIAGNOSIS — Z79.4 TYPE 2 DIABETES MELLITUS WITH DIABETIC NEPHROPATHY, WITH LONG-TERM CURRENT USE OF INSULIN (H): ICD-10-CM

## 2021-11-11 DIAGNOSIS — Z13.220 LIPID SCREENING: ICD-10-CM

## 2021-11-11 DIAGNOSIS — Z94.1 HEART REPLACED BY TRANSPLANT (H): Primary | ICD-10-CM

## 2021-11-16 ENCOUNTER — APPOINTMENT (OUTPATIENT)
Dept: INTERPRETER SERVICES | Facility: CLINIC | Age: 66
End: 2021-11-16
Payer: COMMERCIAL

## 2021-11-17 ENCOUNTER — OFFICE VISIT (OUTPATIENT)
Dept: CARDIOLOGY | Facility: CLINIC | Age: 66
End: 2021-11-17
Attending: INTERNAL MEDICINE
Payer: COMMERCIAL

## 2021-11-17 ENCOUNTER — LAB (OUTPATIENT)
Dept: LAB | Facility: CLINIC | Age: 66
End: 2021-11-17
Payer: COMMERCIAL

## 2021-11-17 VITALS
WEIGHT: 190 LBS | HEIGHT: 65 IN | SYSTOLIC BLOOD PRESSURE: 145 MMHG | OXYGEN SATURATION: 97 % | BODY MASS INDEX: 31.65 KG/M2 | HEART RATE: 95 BPM | DIASTOLIC BLOOD PRESSURE: 85 MMHG

## 2021-11-17 DIAGNOSIS — Z13.220 LIPID SCREENING: ICD-10-CM

## 2021-11-17 DIAGNOSIS — E11.21 TYPE 2 DIABETES MELLITUS WITH DIABETIC NEPHROPATHY, WITH LONG-TERM CURRENT USE OF INSULIN (H): ICD-10-CM

## 2021-11-17 DIAGNOSIS — Z94.1 HEART REPLACED BY TRANSPLANT (H): ICD-10-CM

## 2021-11-17 DIAGNOSIS — Z94.1 HEART REPLACED BY TRANSPLANT (H): Primary | ICD-10-CM

## 2021-11-17 DIAGNOSIS — Z79.4 TYPE 2 DIABETES MELLITUS WITH DIABETIC NEPHROPATHY, WITH LONG-TERM CURRENT USE OF INSULIN (H): ICD-10-CM

## 2021-11-17 DIAGNOSIS — E11.65 TYPE 2 DIABETES MELLITUS WITH HYPERGLYCEMIA, WITH LONG-TERM CURRENT USE OF INSULIN (H): ICD-10-CM

## 2021-11-17 DIAGNOSIS — Z79.4 TYPE 2 DIABETES MELLITUS WITH HYPERGLYCEMIA, WITH LONG-TERM CURRENT USE OF INSULIN (H): ICD-10-CM

## 2021-11-17 LAB
ALBUMIN SERPL-MCNC: 3.4 G/DL (ref 3.4–5)
ALP SERPL-CCNC: 120 U/L (ref 40–150)
ALT SERPL W P-5'-P-CCNC: 24 U/L (ref 0–70)
ANION GAP SERPL CALCULATED.3IONS-SCNC: 3 MMOL/L (ref 3–14)
AST SERPL W P-5'-P-CCNC: 17 U/L (ref 0–45)
BASOPHILS # BLD AUTO: 0 10E3/UL (ref 0–0.2)
BASOPHILS NFR BLD AUTO: 0 %
BILIRUB SERPL-MCNC: 0.4 MG/DL (ref 0.2–1.3)
BUN SERPL-MCNC: 30 MG/DL (ref 7–30)
CALCIUM SERPL-MCNC: 9.1 MG/DL (ref 8.5–10.1)
CHLORIDE BLD-SCNC: 109 MMOL/L (ref 94–109)
CHOLEST SERPL-MCNC: 127 MG/DL
CMV DNA SPEC NAA+PROBE-ACNC: NOT DETECTED IU/ML
CO2 SERPL-SCNC: 27 MMOL/L (ref 20–32)
CREAT SERPL-MCNC: 2.19 MG/DL (ref 0.66–1.25)
CREAT UR-MCNC: 119 MG/DL
EOSINOPHIL # BLD AUTO: 0 10E3/UL (ref 0–0.7)
EOSINOPHIL NFR BLD AUTO: 1 %
ERYTHROCYTE [DISTWIDTH] IN BLOOD BY AUTOMATED COUNT: 13.7 % (ref 10–15)
FASTING STATUS PATIENT QL REPORTED: YES
GFR SERPL CREATININE-BSD FRML MDRD: 30 ML/MIN/1.73M2
GLUCOSE BLD-MCNC: 192 MG/DL (ref 70–99)
HBA1C MFR BLD: 7.3 % (ref 0–5.6)
HCT VFR BLD AUTO: 37 % (ref 40–53)
HDLC SERPL-MCNC: 35 MG/DL
HGB BLD-MCNC: 12 G/DL (ref 13.3–17.7)
IMM GRANULOCYTES # BLD: 0 10E3/UL
IMM GRANULOCYTES NFR BLD: 0 %
LDLC SERPL CALC-MCNC: 28 MG/DL
LVEF ECHO: NORMAL
LYMPHOCYTES # BLD AUTO: 1 10E3/UL (ref 0.8–5.3)
LYMPHOCYTES NFR BLD AUTO: 19 %
MAGNESIUM SERPL-MCNC: 1.9 MG/DL (ref 1.6–2.3)
MCH RBC QN AUTO: 28.7 PG (ref 26.5–33)
MCHC RBC AUTO-ENTMCNC: 32.4 G/DL (ref 31.5–36.5)
MCV RBC AUTO: 89 FL (ref 78–100)
MICROALBUMIN UR-MCNC: 146 MG/L
MICROALBUMIN/CREAT UR: 122.69 MG/G CR (ref 0–17)
MONOCYTES # BLD AUTO: 0.5 10E3/UL (ref 0–1.3)
MONOCYTES NFR BLD AUTO: 9 %
NEUTROPHILS # BLD AUTO: 3.7 10E3/UL (ref 1.6–8.3)
NEUTROPHILS NFR BLD AUTO: 71 %
NONHDLC SERPL-MCNC: 92 MG/DL
NRBC # BLD AUTO: 0 10E3/UL
NRBC BLD AUTO-RTO: 0 /100
PHOSPHATE SERPL-MCNC: 2.4 MG/DL (ref 2.5–4.5)
PLATELET # BLD AUTO: 203 10E3/UL (ref 150–450)
POTASSIUM BLD-SCNC: 4.7 MMOL/L (ref 3.4–5.3)
PROT SERPL-MCNC: 6.6 G/DL (ref 6.8–8.8)
RBC # BLD AUTO: 4.18 10E6/UL (ref 4.4–5.9)
SIROLIMUS BLD-MCNC: 4.8 UG/L (ref 5–15)
SODIUM SERPL-SCNC: 139 MMOL/L (ref 133–144)
TACROLIMUS BLD-MCNC: 5.4 UG/L (ref 5–15)
TME LAST DOSE: ABNORMAL H
TME LAST DOSE: ABNORMAL H
TME LAST DOSE: NORMAL H
TME LAST DOSE: NORMAL H
TRIGL SERPL-MCNC: 322 MG/DL
WBC # BLD AUTO: 5.2 10E3/UL (ref 4–11)

## 2021-11-17 PROCEDURE — 80053 COMPREHEN METABOLIC PANEL: CPT | Performed by: PATHOLOGY

## 2021-11-17 PROCEDURE — 83036 HEMOGLOBIN GLYCOSYLATED A1C: CPT | Performed by: PATHOLOGY

## 2021-11-17 PROCEDURE — 85025 COMPLETE CBC W/AUTO DIFF WBC: CPT | Performed by: PATHOLOGY

## 2021-11-17 PROCEDURE — 83735 ASSAY OF MAGNESIUM: CPT | Performed by: PATHOLOGY

## 2021-11-17 PROCEDURE — 80061 LIPID PANEL: CPT | Performed by: PATHOLOGY

## 2021-11-17 PROCEDURE — 36415 COLL VENOUS BLD VENIPUNCTURE: CPT | Performed by: NURSE PRACTITIONER

## 2021-11-17 PROCEDURE — 99215 OFFICE O/P EST HI 40 MIN: CPT | Mod: 25 | Performed by: NURSE PRACTITIONER

## 2021-11-17 PROCEDURE — 36415 COLL VENOUS BLD VENIPUNCTURE: CPT | Performed by: PATHOLOGY

## 2021-11-17 PROCEDURE — 82043 UR ALBUMIN QUANTITATIVE: CPT | Performed by: PATHOLOGY

## 2021-11-17 PROCEDURE — 84100 ASSAY OF PHOSPHORUS: CPT | Performed by: PATHOLOGY

## 2021-11-17 PROCEDURE — 80195 ASSAY OF SIROLIMUS: CPT | Performed by: NURSE PRACTITIONER

## 2021-11-17 PROCEDURE — 93306 TTE W/DOPPLER COMPLETE: CPT | Performed by: INTERNAL MEDICINE

## 2021-11-17 PROCEDURE — G0463 HOSPITAL OUTPT CLINIC VISIT: HCPCS

## 2021-11-17 PROCEDURE — 80197 ASSAY OF TACROLIMUS: CPT | Performed by: NURSE PRACTITIONER

## 2021-11-17 ASSESSMENT — PAIN SCALES - GENERAL: PAINLEVEL: NO PAIN (0)

## 2021-11-17 ASSESSMENT — MIFFLIN-ST. JEOR: SCORE: 1558.71

## 2021-11-17 NOTE — PROGRESS NOTES
"[facility]  November 17, 2021    HPI:   Mr. Lucian Oliveira is a 66yr old male with a history of CAD (s/p 3v CABG 2008), ICM s/p OHT 7/19/20, DMII, CKD, and HTN who presents to clinic for routine follow up.  A professional  was used for this visit.     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  He ultimately discharged 8/3/20.     He has been readmitted as follows:  - 8/24/20-8/28/20:  hyperglycemia (BGs 500s), thought to be secondary to incorrect home insulin administration  - 9/22/20-10/5/20:  fevers and drainage from former ICD site, with Chest/abd CT concerning for cellulitis in the epigastric region.  He underwent surgical debridement of his left infraclavicular wound and debridement of midline chest tube wound on 9/23, which was c/b bleeding and required reoperation 9/28 for I&D of hematoma.  Cultures were positive for Pseudomonas aeruginosa, so he was treated with 4 weeks of cipro per Transplant ID and CVTS.  He developed leukopenia and moderate neutropenia, so his cellcept was held then restarted at low dose, and valcyte was stopped.  He received neupogen x 1.  He ultimately discharged with a wound vac.  - 12/9/20:  ED visit for right vision loss, and found to have a vitreous hemorrhage in his right eye and total retinal detachment of his left eye per ophthalmology.  He was then seen in the eye clinic 12/10, where he was advised an Avastin injection in the right eye (for proliferative diabetic retinopathy and vitreous hemorrhage).  He then underwent \"27 gauge pars plana vitectomy, membrane peel, perfluoroctane liquid (PFO), retinectomy, " "endolaser\" 12/21 for the tractional retinal detachment of his left eye.  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  Last RHC:  9/28/21 showed normal biventricular filling pressures, with RA 10, mPA 24, PCW 12, and CI 2.6.  Echo/cMRI: Last TTE 7/2021 showed stable graft, function, with LVEF 60-65%, normal RV size/function, and no valvular disease.    Since his last visit, he states that he has been doing well.  He is walking and exercising regularly which continues to feel well with no concerns.  He notes improvements in his strength and endurance overall.  His breathing has been good, and he denies shortness of breath.  He is sleeping in a bed, and uses 2 pillows without PND and orthopnea.  He has good appetite, and is eating regularly without nausea, vomiting, diarrhea, constipation.  His weight has been ranging ~190#, which is stable, and he denies fluid retention.  His blood pressures have been ranging 140/80s.  His BGs 120-200s.  He had a headache this week, which she attributes to not sleeping well.  He states that headaches are not new for him, and he takes Tylenol with relief when needed.  He otherwise denies chest pain, palpitations, dizziness, falls, acute vision changes, fevers, chills, cough, sore throat, and signs of bleeding.      Serostatus:  CMV D+/R+  EBV D+/R+  Toxo D+/R-      Patient Active Problem List    Diagnosis Date Noted     Cardiogenic shock (H) 02/04/2021     Priority: Medium     Immunodeficiency, unspecified (H) 02/02/2021     Priority: Medium     CKD (chronic kidney disease) stage 4, GFR 15-29 ml/min (H) 02/02/2021     Priority: Medium     HIT (heparin-induced thrombocytopenia) (H) 02/02/2021     Priority: Medium     Traction detachment of left retina 12/14/2020     " Priority: Medium     Added automatically from request for surgery [id number]       Pseudomonas infection 10/05/2020     Priority: Medium     Need for prophylactic antibiotic 10/05/2020     Priority: Medium     Trina-Barr virus viremia 10/05/2020     Priority: Medium     Infection due to Streptococcus mitis group 09/23/2020     Priority: Medium     Sepsis (H) 09/22/2020     Priority: Medium     Hyperglycemia 08/24/2020     Priority: Medium     Heart transplant, orthotopic, status (H) 07/20/2020     Priority: Medium     CHF (congestive heart failure) (H) 07/03/2020     Priority: Medium     Acute on chronic systolic congestive heart failure (H) 07/03/2020     Priority: Medium     Added automatically from request for surgery [id number]       Heart failure (H) 07/03/2020     Priority: Medium     Added automatically from request for surgery [id number]       Dental caries 07/03/2020     Priority: Medium     Added automatically from request for surgery [id number]       Heart replaced by transplant (H) 07/03/2020     Priority: Medium     Added automatically from request for surgery [id number]       Status post coronary angiogram 07/02/2020     Priority: Medium     Coronary artery disease involving native coronary artery of native heart without angina pectoris 06/18/2020     Priority: Medium     Added automatically from request for surgery [id number]       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, unspecified whether long term insulin use (H) 11/27/2019     Priority: Medium     Added automatically from request for surgery [id number]       Nuclear cataract, nonsenile 11/27/2019     Priority: Medium     Added automatically from request for surgery [id number]       Heart transplant candidate 07/18/2019     Priority: Medium     Systolic heart failure (H) 05/14/2019     Priority: Medium     Added automatically from request for surgery [id number]       CHANTEL (obstructive sleep apnea)- severe (AHI 30) 10/06/2016      Priority: Medium     Study Date: 10/03/2016- (196.0 lbs) apnea/hypopnea index 30.8. REM AHI 40,  supine AHI n/a. RDI  33.1 . Lowest oxygen saturation 82.8%.  Time spent less than or equal to 88% 4.9 minutes.  Time spent less than or equal to 89% 7.3 minutes. CPAP final  pressure 7 cmH2O with AHI of 0.8.  Time in REM supine on final pressure 0 minutes. No consolidated sleep seen in supine position. During diagnostic portion of study, PLM index 18.2. During treatment portion of study,  PLM index 19.3.             Moderate nonproliferative diabetic retinopathy, without macular edema, associated with type 2 diabetes mellitus 08/16/2016     Priority: Medium     Cortical cataract of both eyes 08/15/2016     Priority: Medium     Secondary renal hyperparathyroidism (H) 07/26/2016     Priority: Medium     Proteinuria 03/18/2016     Priority: Medium     Vitamin D deficiency 03/18/2016     Priority: Medium     OA (osteoarthritis) of knee 03/18/2016     Priority: Medium     Chondromalacia of patella, unspecified laterality 03/18/2016     Priority: Medium     Pain in joint of left shoulder 03/18/2016     Priority: Medium     Tinnitus 03/18/2016     Priority: Medium     left        Ptosis of right eyelid 03/18/2016     Priority: Medium     Chronic systolic heart failure (H)      Priority: Medium     Echo 7/2015 LVEF 18%       Automatic implantable cardioverter-defibrillator - [employer] single lead ICD, Not Dependent 08/18/2015     Priority: Medium     Health Care Home 01/19/2015     Priority: Medium     *See Letters for [facility] Care Plan: Emergency Care Plan         CKD (chronic kidney disease) stage 3, GFR 30-59 ml/min (H) 01/28/2013     Priority: Medium     CHF (NYHA class II, ACC/AHA stage C) (H) 08/15/2012     Priority: Medium     Hypertension goal BP (blood pressure) < 140/90 01/21/2011     Priority: Medium     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) 10/31/2010     Priority: Medium     Goal <8%        Hyperlipidemia LDL goal <100 10/31/2010     Priority: Medium     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 (H) 03/15/2010     Priority: Medium     MI and open heart with 1 stent placed in 2008; another minor MI in 2009.  MI on 2/19/15. Coronary angiogram from [facility] on 2/19/15 showed severe 3 vessel native CAD (all three vessels [LAD, LCx and RCA] reported to be 100% ocludded at their ostia). All his grafts were considered to be occluded except for LIMA-to-LAD, which was patent and supplied left-to-left, as well as, left-to-right collaterals. There were no targets suitable for revascularization and patient was put on medical therapy.        Morbid obesity (H) 09/26/2016     Priority: Low       PAST MEDICAL HISTORY:  Past Medical History:   Diagnosis Date     CAD (coronary artery disease)      CHF (congestive heart failure) (H)      CKD (chronic kidney disease), stage III (H)      Cortical cataract of both eyes      Diabetes (H)      Hyperlipidemia      Hypertension      Infection due to Streptococcus mitis group 09/23/2020     Ischemic cardiomyopathy      Obesity      CHANTEL (obstructive sleep apnea)     occas cpap     Osteoarthritis        CURRENT MEDICATIONS:  Prescription Medications as of 11/17/2021       Rx Number Disp Refills Start End Last Dispensed Date Next Fill Date Owning Pharmacy    acetaminophen (TYLENOL) 325 MG tablet  60 tablet 3 9/2/2020    [facility] - [place], [place] - [address]    Sig: Take 3 tablets (975 mg) by mouth every 8 hours as needed for mild pain    Class: E-Prescribe    Route: Oral    Alcohol Swabs PADS  100 each 0 8/3/2020    [facility] - [place], [place] - [address] [facility] [facility] [address] [facility] [address]    Sig: Use to swab the area of the injection or sergio as directed   Per insurance coverage    Class: Local Print    aspirin (ASA) 81 MG chewable tablet  120 tablet 0 10/6/2020     [facility] [place], [place] - [address]    Sig: Take 1 tablet (81 mg) by mouth daily    Class: E-Prescribe    Route: Oral    atropine 1 % ophthalmic solution            Sig: Place 1 drop Into the left eye daily    Class: Historical    Route: Left Eye    blood glucose (NO BRAND SPECIFIED) test strip  400 strip 3 2021    [facility] #[id number] - [place], [place] - [address] AT [address]    Sig: Use to test blood sugar 4 times daily or as directed.    Class: E-Prescribe    blood glucose monitoring (ACCU-CHEK FELIPE SMARTVIEW) meter device kit  1 kit 0 2017    [facility] [place] - [address] [place] [address]    Sig: Use to test blood sugar 3-4 times daily, as directed.    Class: E-Prescribe    blood glucose monitoring (SOFTCLIX) lancets  400 each 11 2021    [id number] [address] AT [address]    Si each 4 times daily Use to test blood sugars 2 times daily.    Class: E-Prescribe    Route: Does not apply    calcium carbonate 600 mg-vitamin D 400 units (CALTRATE) 600-400 MG-UNIT per tablet  180 tablet 3 2020    [facility] [address]    Sig: Take 1 tablet by mouth 2 times daily (with meals)    Class: E-Prescribe    Route: Oral    Calcium Carbonate-Vitamin D3 600-400 MG-UNIT TABS  180 tablet 3 2021    [facility] [address]    Sig: TAKE ONE TABLET BY MOUTH TWICE A DAY WITH MEALS    Class: E-Prescribe    Continuous Blood Gluc Sensor (FREESTYLE JEREMY 14 DAY SENSOR) [facility]  2 each 11 2020    [facility]    Sig: Change every 14 days.    Class: Local Print    Continuous Blood Gluc Sensor (FREESTYLE JEREMY 14 DAY SENSOR) [facility]  6 each 4 2020    [facility] #[id number] - [place], [place] - [address] AT [address]    Sig: Change every 14 days.    Class:  E-Prescribe    Notes to Pharmacy: Use to check BG 5 times daily.    dulaglutide (TRULICITY) 0.75 MG/0.5ML pen  7 mL 0 10/19/2021    [facility][id number] [facility] [address] AT [address]    Sig: Inject 0.75 mg Subcutaneous every 7 days    Class: E-Prescribe    Route: Subcutaneous    furosemide (LASIX) 20 MG tablet  90 tablet 3 10/12/2020    [facility]    Sig: Take 1 tablet (20 mg) by mouth daily    Class: E-Prescribe    Route: Oral    HUMALOG KWIKPEN 100 UNIT/ML soln  90 mL 3 8/4/2021    [facility] #[id number] [place] [address] AT [address]    Sig: Inject 0-31 Units Subcutaneous 3 times daily (with meals) + Custom MEAL and SNACK corrective dosing   Approx 90 units daily max    Class: E-Prescribe    hydrALAZINE (APRESOLINE) 100 MG tablet  270 tablet 3 11/13/2020    [facility]/[address]    Sig: Take 1 tablet (100 mg) by mouth every 8 hours    Class: E-Prescribe    Route: Oral    insulin aspart (NOVOLOG PEN) 100 UNIT/ML pen  6 mL 0 10/5/2020    [facility] [place], [place] - [address]    Sig: Inject 0-31 Units Subcutaneous 3 times daily (with meals) Custom MEAL and SNACK corrective dosing     BG less than 80, do not give Novolog.  BG , give  15 units.  -169, give  17 units.  -199 give 19 units.  -229 give 21 units.  -259 give 23 units.  -289 give 25 units.  -319 give 27 units.  -349 give 29 units.  BG >/= 350 give 31 units.  To be given with meal based on pre-meal blood glucose    Class: Local Print    Route: Subcutaneous    insulin isophane human (HUMULIN N PEN) 100 UNIT/ML injection  30 mL 11 3/2/2021    [facility] #[id number] [facility] [address] AT [address]    Sig: Inject 35 Units Subcutaneous every morning    Class: E-Prescribe     Route: Subcutaneous    insulin isophane human (HUMULIN N PEN) 100 UNIT/ML injection  3 mL 1 10/5/2020    [facility] [place], [place] - [address]    Sig: Inject 8 Units Subcutaneous At Bedtime    Class: E-Prescribe    Route: Subcutaneous    insulin pen needle (32G X 4 MM) 32G X 4 MM miscellaneous  450 each 3 10/19/2021    [facility][id number] - [place], [place] - [address] AT [facility]    Sig: Use 5-6 pen needles daily or as directed.    Class: E-Prescribe    latanoprost (XALATAN) 0.005 % ophthalmic solution  15 mL 11 11/3/2021    [facility] #[id number] - [place], [place] - [address] AT [facility]    Sig: INSTILL 1 DROP IN BOTH EYES AT BEDTIME    Class: E-Prescribe    magnesium oxide (MAG-OX) 400 (241.3 Mg) MG tablet  180 tablet 3 10/26/2021    [facility]    Sig: TAKE ONE TABLET BY MOUTH TWICE A DAY    Class: E-Prescribe    magnesium oxide 400 MG CAPS  180 capsule 3 9/22/2020    [facility] [address] [facility]    Sig: Take 400 mg by mouth 2 times daily    Class: E-Prescribe    Route: Oral    neomycin-polymixin-dexamethasone (MAXITROL) 0.1 % ophthalmic suspension  5 mL 0 12/21/2020    [place], [place] - [address]    Sig: Apply 1 drop to eye 4 times daily Instill into operative eye(s) per physician instructions.    Class: E-Prescribe    Route: Ophthalmic    neomycin-polymixin-dexamethasone (MAXITROL) ophthalmic ointment            Sig: Place 1 Application Into the left eye At Bedtime     Class: Historical    Route: Left Eye    order for DME  1 Units 0 1/11/2017        Sig: Auto CPAP 8-15 cmH20    Class: [facility]    pantoprazole (PROTONIX) 40 MG EC tablet    11/6/2020    [facility] [place] [address]    Class: Historical    prednisoLONE acetate (PRED FORTE) 1 %  ophthalmic suspension  15 mL 1 11/3/2021    [facility] #[id number] - [place], [place] - [address] AT [address]    Sig: Place 1-2 drops Into the left eye every other day    Class: E-Prescribe    Route: Left Eye    prednisoLONE acetate (PRED FORTE) 1 % ophthalmic suspension  1 Bottle 1 1/14/2021    [facility] #[id number] - [place], [place] - [address] AT [address]    Sig: Place 1-2 drops Into the left eye 2 times daily    Class: E-Prescribe    Notes to Pharmacy: use 1 drop left eye 2 x daily x 1 week, then 1 drop 1 x daily x 1 week, then stop    Route: Left Eye    rosuvastatin (CRESTOR) 10 MG tablet  90 tablet 2 6/7/2021    [facility] [place] [address]    Sig: TAKE ONE TABLET BY MOUTH ONCE DAILY    Class: E-Prescribe    senna-docusate (SENOKOT-S/PERICOLACE) 8.6-50 MG tablet  60 tablet 0 10/5/2020    [facility] - [place], [place] - [address] [place] [address] [address]    Sig: Take 1 tablet by mouth 2 times daily (hold for loose stools)    Class: E-Prescribe    Route: Oral    sirolimus (GENERIC EQUIVALENT) 0.5 MG tablet  120 tablet 11 10/8/2021    [facility] [address]    Sig: Take 4 tablets (2 mg) by mouth daily    Class: E-Prescribe    Notes to Pharmacy: TXP DT 7/19/2020 (Heart) TXP Dischg DT 8/3/2020 DX Heart  transplant Z94.1 [facility] ([place], [place])    Route: Oral    Prior authorization: Closed - Other    sulfamethoxazole-trimethoprim (BACTRIM) 400-80 MG tablet  90 tablet 3 8/26/2021    [facility] [address]    Sig: TAKE ONE TABLET BY MOUTH ONCE DAILY    Class: E-Prescribe    tacrolimus (GENERIC EQUIVALENT) 1 MG capsule  240 capsule 11 10/8/2021    [facility] [address]    Sig: Take 4 capsules (4 mg) by mouth every morning AND 4 capsules (4 mg)  "every evening.    Class: E-Prescribe    Notes to Pharmacy: TXP DT 7/19/2020 (Heart) TXP Dischg DT 8/3/2020 DX Heart transplant Z94.1 TX Center [facility] [place] ([place], [place])    Route: Oral    Prior authorization: Closed - Other    tamsulosin (FLOMAX) 0.4 MG capsule  90 capsule 3 8/18/2021    [facility] - [place], [place] - [address]    Sig: Take 1 capsule (0.4 mg) by mouth daily    Class: E-Prescribe    Route: Oral      Clinic-Administered Medications as of 11/17/2021       Dose Frequency Start End    bevacizumab (AVASTIN) intravitreal inj 1.25 mg 1.25 mg EVERY 28 DAYS 11/3/2021     Admin Instructions: PRN Q3-52 weeks  RE    Route: Intravitreal          ROS:   Constitutional: No fever, chills, or sweats.  Stable weight.   ENT: No visual disturbance, ear ache, epistaxis, sore throat.   Allergies/Immunologic: Negative.   Respiratory: As per HPI.   Cardiovascular: As per HPI.   GI: No nausea, vomiting, hematemesis, melena, or hematochezia.   : No urinary frequency, dysuria, or hematuria.   Integument: Negative.   Psychiatric: Negative.   Neuro: Negative.   Endocrinology: Negative.   Musculoskeletal: As per HPI    Exam:  BP (!) 145/85 (BP Location: Left arm, Patient Position: Chair, Cuff Size: Adult Regular)   Pulse 95   Ht 1.651 m (5' 5\")   Wt 86.2 kg (190 lb)   SpO2 97%   BMI 31.62 kg/m    In general, the patient is a pleasant male in no apparent distress.    HEENT: NC/AT. PERRLA. EOMI.  Sclerae white, not injected.    Neck:  No adenopathy, No thyromegaly.    COR: JVD at clavicle when sitting upright.  RRR.  Normal S1 S2 splits physiologically.  No murmur, rub click, or gallop.    Lungs:  CTA. No rhonchi.    Abdomen: soft, nontender, nondistended.  No organomegaly.  Extremities:  No clubbing or cyanosis, trace-mild bilateral LE edema.    Neuro: Alert & Oriented x 3, grossly non focal.  Integument: No open lesions, rash or jaundice.    Labs:  CBC RESULTS:   Lab " Results   Component Value Date    WBC 5.2 11/17/2021    WBC 5.1 05/11/2021    RBC 4.18 (L) 11/17/2021    RBC 4.47 05/11/2021    HGB 12.0 (L) 11/17/2021    HGB 13.4 05/11/2021    HCT 37.0 (L) 11/17/2021    HCT 41.5 05/11/2021    MCV 89 11/17/2021    MCV 93 05/11/2021    MCH 28.7 11/17/2021    MCH 30.0 05/11/2021    MCHC 32.4 11/17/2021    MCHC 32.3 05/11/2021    RDW 13.7 11/17/2021    RDW 13.2 05/11/2021     11/17/2021     05/11/2021       BMP RESULTS:  Lab Results   Component Value Date     11/17/2021     05/11/2021    POTASSIUM 4.7 11/17/2021    POTASSIUM 4.0 05/11/2021    CHLORIDE 109 11/17/2021    CHLORIDE 107 05/11/2021    CO2 27 11/17/2021    CO2 27 05/11/2021    ANIONGAP 3 11/17/2021    ANIONGAP 6 05/11/2021     (H) 11/17/2021    GLC 98 05/11/2021    BUN 30 11/17/2021    BUN 34 (H) 05/11/2021    CR 2.19 (H) 11/17/2021    CR 1.84 (H) 05/11/2021    GFRESTIMATED 30 (L) 11/17/2021    GFRESTIMATED 37 (L) 05/11/2021    GFRESTBLACK 43 (L) 05/11/2021    BRYANNA 9.1 11/17/2021    BRYANNA 9.0 05/11/2021      LIPID RESULTS:  Lab Results   Component Value Date    CHOL 127 11/17/2021    CHOL 133 01/05/2021    HDL 35 (L) 11/17/2021    HDL 40 01/05/2021    LDL 28 11/17/2021    LDL 58 01/05/2021    TRIG 322 (H) 11/17/2021    TRIG 179 (H) 01/05/2021    CHOLHDLRATIO 2.6 06/27/2015    NHDL 92 11/17/2021    NHDL 94 01/05/2021       IMMUNOSUPPRESSANT LEVELS  Lab Results   Component Value Date    TACROL 4.4 (L) 10/15/2021    TACROL 5.6 05/11/2021    DOSTAC 10/15/2021 10/15/2021    DOSTAC  7pm 5/10/21 05/11/2021    RAPAMY 5.3 10/15/2021       No components found for: CK  Lab Results   Component Value Date    MAG 1.9 11/17/2021    MAG 1.8 05/11/2021     Lab Results   Component Value Date    A1C 7.3 (H) 11/17/2021    A1C 6.7 (H) 01/14/2021     Lab Results   Component Value Date    PHOS 2.4 (L) 11/17/2021    PHOS 3.2 05/11/2021     Lab Results   Component Value Date    NTBNPI 8,792 (H) 09/22/2020     Lab  Results   Component Value Date    SAITESTMET [facility] 07/19/2021    SAITESTMET [facility] 01/05/2021    SAICELL Class I 07/19/2021    SAICELL Class I 01/05/2021    [id number] None 07/19/2021    [id number] None 01/05/2021    [id number] None 07/19/2021    [id number] None 01/05/2021    SAIREPCOM  07/19/2021      Test performed by modified procedure. Serum heat inactivated and tested by a modified ([facility]) protocol including fetal calf serum addition. High-risk, mfi >3,000. Mod-risk, mfi 500-3,000.    SAIREPCOM  01/05/2021      Test performed by modified procedure. Serum heat inactivated and tested   by a modified ([facility]) protocol including fetal calf serum addition.   High-risk, mfi >3,000. Mod-risk, mfi 500-3,000.       Lab Results   Component Value Date    SAIITESTME [facility] 07/19/2021    SAIITESTME [facility] 01/05/2021    SAIICELL Class II 07/19/2021    SAIICELL Class II 01/05/2021    [id number] None 07/19/2021    [id number] None 01/05/2021    CN1TASNCPO DR:11 07/19/2021    [id number] None 01/05/2021    SAIIREPCOM  07/19/2021      Test performed by modified procedure. Serum heat inactivated and tested by a modified ([facility]) protocol including fetal calf serum addition. High-risk, mfi >3,000. Mod-risk, mfi 500-3,000.    SAIIREPCOM  01/05/2021      Test performed by modified procedure. Serum heat inactivated and tested   by a modified ([facility]) protocol including fetal calf serum addition.   High-risk, mfi >3,000. Mod-risk, mfi 500-3,000.       Lab Results   Component Value Date    CSPEC EDTA PLASMA 05/11/2021       Assessment and Plan:  Mr. Lucian Oliveira is a 66yr old male with a history of CAD (s/p 3v CABG 2008), ICM s/p OHT 7/19/20, DMII, CKD, and HTN who presents to clinic for routine follow up.  A professional  was used for this visit.    Labs today show stable electrolytes.  Creatinine up to 2.19.  Liver function and blood counts remained stable.  HgbA1c down to 7.3%.  Fasting lipid profile shows elevated triglycerides  at 322.  CBC shows stable blood counts.  CMV, AlloMap, sirolimus, and tacrolimus levels are in process.    Echocardiogram from today shows stable graft function, with LVEF 60-65%, normal RV size/function, and normal valvular function.    Mr. Oliveira appears well today.  His weight trend remained stable, and he appears euvolemic.  His blood pressures remain somewhat controlled, per his home report.    As his renal function has continued to deteriorate, will rediscuss decreasing Bactrim therapy to 3x/week with Dr. Sheridan.  Advised him that we will update him with further directions after I speak with Dr. Sheridan.    Otherwise, advised that he continue his current medications, with no changes.  We will plan for him to follow-up in clinic per protocol, or sooner should new concerns arise.      ICM, s/p OHT 7/19/20  His post-operative course was c/b primary graft dysfunction (LVEF 20-25% and severe RV dysfunction, thought to be secondary to prolonged ischemic time, resolved by f/up TTE 7/27/20), VT, pericardial effusion (incidentally found per TTE 7/27, decreased size noted on TTE 7/29), donor streptococcus salivarius bacteremia (s/p 7d course of ceftriaxone), and RUE DVT c/b HIT (s/p PLEX).  He ultimately discharged 8/3/20.    Rejection history:  none  AlloMap scores:  <35  DSAs:  none  Coronary angio/Ischemic eval: Baseline coronary angiogram 7/2020 showed normal coronary arteries, with no angiographic signs of obstructive CAV.  Last RHC:  9/28/21 showed normal biventricular filling pressures, with RA 10, mPA 24, PCW 12, and CI 2.6.  Echo/cMRI: Last TTE 7/2021 showed stable graft, function, with LVEF 60-65%, normal RV size/function, and no valvular disease.    Serostatus:  - CMV D+/R+  - EBV D+/R+  - Toxo D+/R-     Immunosuppression:  - Sirolimus, goal level 3-5.  Level today in process.  - tacro, goal level 3-5.  Level today in process.     PPx:  - CAV:  Aspirin 81mg daily and rosuvastatin 10mg daily  -  "GI:  Pantoprazole 40mg daily  - Osteoporosis:  Calcium/vitamin D supplements  - Toxo:  Single strength bactrim, lifelong ppx --> will discuss decreasing dose to 3x/week with Dr. Sheridan, due to renal function     Graft function:  - BPs:  Stable, continue hydral 100mg TID  - fluid status:  Euvolemic, taking lasix 20mg daily    Health maintenance:  - derm exam -- never seen  - eye exam UTD   - dental exam overdue, last exam was over 1 year ago  - last colo 2019, due 2022  - completed COVID shots, 3/3  - received flu shot   - pneumonia shots unclear --> discuss with PCP  - needs shingrix      Hypertriglyceridemia  Triglycerides are 322 today.  Will review ongoing plan with Dr. Sheridan.  Again discussed the importance of heart healthy dieting and regular aerobic activity.     DMII  Follows with endo and PCP.  HgbA1c down slightly today.     HIT  HIT antibody + 7/2020.  CORRINE +.  No heparin products.     RIJ and axillary vein DVT, nonocclusive  Right cephalic vein occlusive thrombus  S/p course of anticoagulation.     Total retinal detachment of the left eye, s/p retinectomy 12/21/20  Proliferative diabetic retinopathy  Right vitreous hemorrhage  He presented to the ED 12/9/20 with right vision loss.  Ophthalmology was consulted, and found that he had a vitreous hemorrhage in his right eye and total retinal detachment of his left eye.  He was then seen in the eye clinic 12/10/20, where he was advised an Avastin injection in the right eye (for proliferative diabetic retinopathy and vitreous hemorrhage).  He then underwent \"27 gauge pars plana vitectomy, membrane peel, perfluoroctane liquid (PFO), retinectomy, endolaser\" 12/21/20 for the tractional retinal detachment of his left eye.  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         The above was reviewed with  Tee and his wife via professional .  " Theyverbalized understanding and will call with further questions/concerns.    45 minutes spent with patient, along with preparing for visit, reviewing follow up, and completing documentation.      Arlin Guajardo DNP, FNP-BC, CHFN  Advanced Heart Failure Nurse Practitioner  [facility]  Patient Care Team:  No Ref-Primary, Physician as PCP - Diane Groves APRN CNP as Nurse Practitioner (Cardiology)  Arvin Sheridan MD as MD (Cardiology)  Yue Dowd MD as MD (INTERNAL MEDICINE - ENDOCRINOLOGY, DIABETES & METABOLISM)  Christelle Pettit RN as Transplant Coordinator (Cardiology)  Negrito Rai [facility] as Pharmacist (Pharmacist)  [facility] ([facility] ([facility] ([place]  Triny Bunch MD as Assigned Surgical Provider  Marisabel Prieto PA-C as Assigned Endocrinology Provider  Arvin Sheridan MD as Assigned Heart and Vascular Provider  Edita Espitia MD as Assigned PCP

## 2021-11-17 NOTE — NURSING NOTE
Chief Complaint   Patient presents with     Follow Up     16 month post transplant      Vitals were taken and medications were reconciled.   Robert Leslie, EMT  9:59 AM

## 2021-11-17 NOTE — PATIENT INSTRUCTIONS
Patient Instructions  1. See the Dermatologist (Christelle will send a referral), see the Dentist.  2. Talk to your Primary MD about the pneumonia and shingles vaccines.  3. Christelle will call with all remaining testing results.    Next transplant clinic appointment:  In 4 months for 20 month visit.  Next lab draw:   Coordinator will call with all pending results.     Please call transplant coordinator with any questions:    Christelle Pettit RN BSN   Post Heart Transplant Nurse Coordinator  [facility]  Questions: [phone]

## 2021-11-17 NOTE — NURSING NOTE
Transplant Coordinator Note    Reason for visit: 16 month visit  Coordinator: Present   Caregiver:  Wife    Health concerns addressed today:  1. BGs elevated- following with Endo  2. Creat 2.19- stable- plan to ask about decreasing Bactrim frequency  3.       Immunosuppressants:  Rapa 2mg daily  Tacro 4/4      Routine screenings:    Derm: DUE  Dental:DUE  Colonoscopy: 2022  Breast/Prostate:PSA normal  Eye: UTD  Flu/Pneumonia: Flu shot done, Covid x3, will check with PMD regarding pneumonia vaccine    Labs:       Additional Notes:          Labs, echo, reviewed with patient  Medication record reviewed and reconciled  Questions and concerns addressed  Pt verbalized an understanding of plan of care.     Patient Instructions  1. See the Dermatologist (Christelle will send a referral), see the Dentist.  2. Talk to your Primary MD about the pneumonia and shingles vaccines.  3. Christelle will call with all remaining testing results.    Next transplant clinic appointment:  In 4 months for 20 month visit.  Next lab draw:   Coordinator will call with all pending results.     Please call transplant coordinator with any questions:    Christelle Pettit RN BSN   Post Heart Transplant Nurse Coordinator  [facility]  Questions: [phone]

## 2021-11-17 NOTE — LETTER
11/17/2021      RE: Lucian Oliveira  [address] [place] [address]       Dear Colleague,    Thank you for the opportunity to participate in the care of your patient, Lucian Oliveira, at the [facility] HEART [facility]INIC [place] [facility]. Please see a copy of my visit note below.    ADULT HEART TRANSPLANT CLINIC  November 17, 2021    HPI:   Mr. Lucian Oliveira is a 66yr old male with a history of CAD (s/p 3v CABG 2008), ICM s/p OHT 7/19/20, DMII, CKD, and HTN who presents to clinic for routine follow up.  A professional  was used for this visit.     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  He ultimately discharged 8/3/20.     He has been readmitted as follows:  - 8/24/20-8/28/20:  hyperglycemia (BGs 500s), thought to be secondary to incorrect home insulin administration  - 9/22/20-10/5/20:  fevers and drainage from former ICD site, with Chest/abd CT concerning for cellulitis in the epigastric region.  He underwent surgical debridement of his left infraclavicular wound and debridement of midline chest tube wound on 9/23, which was c/b bleeding and required reoperation 9/28 for I&D of hematoma.  Cultures were positive for Pseudomonas aeruginosa, so he was treated with 4 weeks of cipro per Transplant ID and CVTS.  He developed leukopenia and moderate neutropenia, so his cellcept was held then restarted at low dose, and valcyte was stopped.  He received neupogen x 1.  He ultimately discharged with a wound vac.  - 12/9/20:  ED visit for right vision loss, and found to have a vitreous hemorrhage in his  "right eye and total retinal detachment of his left eye per ophthalmology.  He was then seen in the eye clinic 12/10, where he was advised an Avastin injection in the right eye (for proliferative diabetic retinopathy and vitreous hemorrhage).  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  Last RHC:  9/28/21 showed normal biventricular filling pressures, with RA 10, mPA 24, PCW 12, and CI 2.6.  Echo/cMRI: Last TTE 7/2021 showed stable graft, function, with LVEF 60-65%, normal RV size/function, and no valvular disease.    Since his last visit, he states that he has been doing well.  He is walking and exercising regularly which continues to feel well with no concerns.  He notes improvements in his strength and endurance overall.  His breathing has been good, and he denies shortness of breath.  He is sleeping in a bed, and uses 2 pillows without PND and orthopnea.  He has good appetite, and is eating regularly without nausea, vomiting, diarrhea, constipation.  His weight has been ranging ~190#, which is stable, and he denies fluid retention.  His blood pressures have been ranging 140/80s.  His BGs 120-200s.  He had a headache this week, which she attributes to not sleeping well.  He states that headaches are not new for him, and he takes Tylenol with relief when needed.  He otherwise denies chest pain, palpitations, dizziness, falls, acute vision changes, fevers, chills, cough, sore throat, and signs of bleeding.      Serostatus:  CMV D+/R+  EBV D+/R+  Toxo D+/R-      Patient Active Problem List    Diagnosis Date Noted     " Cardiogenic shock (H) 02/04/2021     Priority: Medium     Immunodeficiency, unspecified (H) 02/02/2021     Priority: Medium     CKD (chronic kidney disease) stage 4, GFR 15-29 ml/min (H) 02/02/2021     Priority: Medium     HIT (heparin-induced thrombocytopenia) (H) 02/02/2021     Priority: Medium     Traction detachment of left retina 12/14/2020     Priority: Medium     Added automatically from request for surgery [id number]       Pseudomonas infection 10/05/2020     Priority: Medium     Need for prophylactic antibiotic 10/05/2020     Priority: Medium     Trina-Barr virus viremia 10/05/2020     Priority: Medium     Infection due to Streptococcus mitis group 09/23/2020     Priority: Medium     Sepsis (H) 09/22/2020     Priority: Medium     Hyperglycemia 08/24/2020     Priority: Medium     Heart transplant, orthotopic, status (H) 07/20/2020     Priority: Medium     CHF (congestive heart failure) (H) 07/03/2020     Priority: Medium     Acute on chronic systolic congestive heart failure (H) 07/03/2020     Priority: Medium     Added automatically from request for surgery [id number]       Heart failure (H) 07/03/2020     Priority: Medium     Added automatically from request for surgery [id number]       Dental caries 07/03/2020     Priority: Medium     Added automatically from request for surgery [id number]       Heart replaced by transplant (H) 07/03/2020     Priority: Medium     Added automatically from request for surgery [id number]       Status post coronary angiogram 07/02/2020     Priority: Medium     Coronary artery disease involving native coronary artery of native heart without angina pectoris 06/18/2020     Priority: Medium     Added automatically from request for surgery [id number]       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, unspecified whether long term insulin use (H) 11/27/2019     Priority: Medium     Added automatically from request for surgery [id number]       Nuclear  cataract, nonsenile 11/27/2019     Priority: Medium     Added automatically from request for surgery [id number]       Heart transplant candidate 07/18/2019     Priority: Medium     Systolic heart failure (H) 05/14/2019     Priority: Medium     Added automatically from request for surgery [id number]       CHANTEL (obstructive sleep apnea)- severe (AHI 30) 10/06/2016     Priority: Medium     Study Date: 10/03/2016- (196.0 lbs) apnea/hypopnea index 30.8. REM AHI 40,  supine AHI n/a. RDI  33.1 . Lowest oxygen saturation 82.8%.  Time spent less than or equal to 88% 4.9 minutes.  Time spent less than or equal to 89% 7.3 minutes. CPAP final  pressure 7 cmH2O with AHI of 0.8.  Time in REM supine on final pressure 0 minutes. No consolidated sleep seen in supine position. During diagnostic portion of study, PLM index 18.2. During treatment portion of study,  PLM index 19.3.             Moderate nonproliferative diabetic retinopathy, without macular edema, associated with type 2 diabetes mellitus 08/16/2016     Priority: Medium     Cortical cataract of both eyes 08/15/2016     Priority: Medium     Secondary renal hyperparathyroidism (H) 07/26/2016     Priority: Medium     Proteinuria 03/18/2016     Priority: Medium     Vitamin D deficiency 03/18/2016     Priority: Medium     OA (osteoarthritis) of knee 03/18/2016     Priority: Medium     Chondromalacia of patella, unspecified laterality 03/18/2016     Priority: Medium     Pain in joint of left shoulder 03/18/2016     Priority: Medium     Tinnitus 03/18/2016     Priority: Medium     left        Ptosis of right eyelid 03/18/2016     Priority: Medium     Chronic systolic heart failure (H)      Priority: Medium     Echo 7/2015 LVEF 18%       Automatic implantable cardioverter-defibrillator - [employer] single lead ICD, Not Dependent 08/18/2015     Priority: Medium     Health Care Home 01/19/2015     Priority: Medium     *See Letters for [facility] Care Plan: Emergency Care Plan         CKD  (chronic kidney disease) stage 3, GFR 30-59 ml/min (H) 01/28/2013     Priority: Medium     CHF (NYHA class II, ACC/AHA stage C) (H) 08/15/2012     Priority: Medium     Hypertension goal BP (blood pressure) < 140/90 01/21/2011     Priority: Medium     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) 10/31/2010     Priority: Medium     Goal <8%       Hyperlipidemia LDL goal <100 10/31/2010     Priority: Medium     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 (H) 03/15/2010     Priority: Medium     MI and open heart with 1 stent placed in 2008; another minor MI in 2009.  MI on 2/19/15. Coronary angiogram from [facility] on 2/19/15 showed severe 3 vessel native CAD (all three vessels [LAD, LCx and RCA] reported to be 100% ocludded at their ostia). All his grafts were considered to be occluded except for LIMA-to-LAD, which was patent and supplied left-to-left, as well as, left-to-right collaterals. There were no targets suitable for revascularization and patient was put on medical therapy.        Morbid obesity (H) 09/26/2016     Priority: Low       PAST MEDICAL HISTORY:  Past Medical History:   Diagnosis Date     CAD (coronary artery disease)      CHF (congestive heart failure) (H)      CKD (chronic kidney disease), stage III (H)      Cortical cataract of both eyes      Diabetes (H)      Hyperlipidemia      Hypertension      Infection due to Streptococcus mitis group 09/23/2020     Ischemic cardiomyopathy      Obesity      CHANTEL (obstructive sleep apnea)     occas cpap     Osteoarthritis        CURRENT MEDICATIONS:  Prescription Medications as of 11/17/2021       Rx Number Disp Refills Start End Last Dispensed Date Next Fill Date Owning Pharmacy    acetaminophen (TYLENOL) 325 MG tablet  60 tablet 3 9/2/2020    [facility] - [place], [place] - [facility] [address]    Sig: Take 3 tablets (975 mg) by mouth every 8  hours as needed for mild pain    Class: E-Prescribe    Route: Oral    Alcohol Swabs PADS  100 each 0 8/3/2020    [place], [place] - [address] [place] [address]    Sig: Use to swab the area of the injection or sergio as directed   Per insurance coverage    Class: Local Print    aspirin (ASA) 81 MG chewable tablet  120 tablet 0 10/6/2020    [facility] - [place], [place] - [address] [place] [address]    Sig: Take 1 tablet (81 mg) by mouth daily    Class: E-Prescribe    Route: Oral    atropine 1 % ophthalmic solution            Sig: Place 1 drop Into the left eye daily    Class: Historical    Route: Left Eye    blood glucose (NO BRAND SPECIFIED) test strip  400 strip 3 2021    [id number] [address] AT [facility]    Sig: Use to test blood sugar 4 times daily or as directed.    Class: E-Prescribe    blood glucose monitoring (ACCU-CHEK FELIPE SMARTVIEW) meter device kit  1 kit 0 2017    [facility]    Sig: Use to test blood sugar 3-4 times daily, as directed.    Class: E-Prescribe    blood glucose monitoring (SOFTCLIX) lancets  400 each 11 2021    [facility] #[id number] [facility] [address] [place] [address] AT [address]    Si each 4 times daily Use to test blood sugars 2 times daily.    Class: E-Prescribe    Route: Does not apply    calcium carbonate 600 mg-vitamin D 400 units (CALTRATE) 600-400 MG-UNIT per tablet  180 tablet 3 2020    [facility] [address]     Sig: Take 1 tablet by mouth 2 times daily (with meals)    Class: E-Prescribe    Route: Oral    Calcium Carbonate-Vitamin D3 600-400 MG-UNIT TABS  180 tablet 3 2021    [facility]    Sig: TAKE ONE TABLET BY MOUTH TWICE A DAY WITH MEALS    Class: E-Prescribe    Continuous Blood Gluc  Sensor (FREESTYLE JEREMY 14 DAY SENSOR) [facility]  2 each 11 8/28/2020    [facility]    Sig: Change every 14 days.    Class: Local Print    Continuous Blood Gluc Sensor (FREESTYLE JEREMY 14 DAY SENSOR) [facility]  6 each 4 8/18/2020    [facility] #[id number] - [place], [place] - [address] AT [facility]    Sig: Change every 14 days.    Class: E-Prescribe    Notes to Pharmacy: Use to check BG 5 times daily.    dulaglutide (TRULICITY) 0.75 MG/0.5ML pen  7 mL 0 10/19/2021    [facility] #[id number] - [place], [place] - [address] AT [address]    Sig: Inject 0.75 mg Subcutaneous every 7 days    Class: E-Prescribe    Route: Subcutaneous    furosemide (LASIX) 20 MG tablet  90 tablet 3 10/12/2020    [facility] [place], [place] - [address] [place], [place] [address]     Sig: Take 1 tablet (20 mg) by mouth daily    Class: E-Prescribe    Route: Oral    HUMALOG KWIKPEN 100 UNIT/ML soln  90 mL 3 8/4/2021    [facility] #[id number] - [place], [place] - [address] AT [address]    Sig: Inject 0-31 Units Subcutaneous 3 times daily (with meals) + Custom MEAL and SNACK corrective dosing   Approx 90 units daily max    Class: E-Prescribe    hydrALAZINE (APRESOLINE) 100 MG tablet  270 tablet 3 11/13/2020    [facility] [address]    Sig: Take 1 tablet (100 mg) by mouth every 8 hours    Class: E-Prescribe    Route: Oral    insulin aspart (NOVOLOG PEN) 100 UNIT/ML pen  6 mL 0 10/5/2020    [facility] - [place], [place] - [address] [facility] [address]    Sig: Inject 0-31 Units Subcutaneous 3 times daily (with meals) Custom MEAL and SNACK corrective dosing     BG less than 80, do not give Novolog.  BG , give  15 units.  -169, give  17 units.  -199 give 19 units.  -229 give 21 units.  -259 give 23 units.  -289 give 25 units.  -319 give 27 units.  -349 give 29 units.  BG >/= 350 give  31 units.  To be given with meal based on pre-meal blood glucose    Class: Local Print    Route: Subcutaneous    insulin isophane human (HUMULIN N PEN) 100 UNIT/ML injection  30 mL 11 3/2/2021    [facility] #[id number] - [place], [place] - [address] AT [address]    Sig: Inject 35 Units Subcutaneous every morning    Class: E-Prescribe    Route: Subcutaneous    insulin isophane human (HUMULIN N PEN) 100 UNIT/ML injection  3 mL 1 10/5/2020    [place], [place] - [address]    Sig: Inject 8 Units Subcutaneous At Bedtime    Class: E-Prescribe    Route: Subcutaneous    insulin pen needle (32G X 4 MM) 32G X 4 MM miscellaneous  450 each 3 10/19/2021    [facility] #[id number] - [facility] [address] AT [address]    Sig: Use 5-6 pen needles daily or as directed.    Class: E-Prescribe    latanoprost (XALATAN) 0.005 % ophthalmic solution  15 mL 11 11/3/2021    [facility] #[id number] - [place], [place] - [address] AT [address]    Sig: INSTILL 1 DROP IN BOTH EYES AT BEDTIME    Class: E-Prescribe    magnesium oxide (MAG-OX) 400 (241.3 Mg) MG tablet  180 tablet 3 10/26/2021    [facility]    Sig: TAKE ONE TABLET BY MOUTH TWICE A DAY    Class: E-Prescribe    magnesium oxide 400 MG CAPS  180 capsule 3 9/22/2020    [facility]    Sig: Take 400 mg by mouth 2 times daily    Class: E-Prescribe    Route: Oral    neomycin-polymixin-dexamethasone (MAXITROL) 0.1 % ophthalmic suspension  5 mL 0 12/21/2020    [facility] [address]    Sig: Apply 1 drop to eye 4 times daily Instill into operative eye(s) per physician instructions.    Class: E-Prescribe    Route: Ophthalmic    neomycin-polymixin-dexamethasone (MAXITROL) ophthalmic ointment            Sig: Place 1 Application Into  the left eye At Bedtime     Class: Historical    Route: Left Eye    order for DME  1 Units 0 1/11/2017        Sig: Auto CPAP 8-15 cmH20    Class: [facility] Center    pantoprazole (PROTONIX) 40 MG EC tablet    11/6/2020    [facility] - [place] [address] [address]     Class: Historical    prednisoLONE acetate (PRED FORTE) 1 % ophthalmic suspension  15 mL 1 11/3/2021    [facility] #[id number] - [facility] [address] AT [facility]    Sig: Place 1-2 drops Into the left eye every other day    Class: E-Prescribe    Route: Left Eye    prednisoLONE acetate (PRED FORTE) 1 % ophthalmic suspension  1 Bottle 1 1/14/2021    [facility] #[id number] - [place], [place] - [address] AT [facility]    Sig: Place 1-2 drops Into the left eye 2 times daily    Class: E-Prescribe    Notes to Pharmacy: use 1 drop left eye 2 x daily x 1 week, then 1 drop 1 x daily x 1 week, then stop    Route: Left Eye    rosuvastatin (CRESTOR) 10 MG tablet  90 tablet 2 6/7/2021    [facility] - [place] [address] [address]    Sig: TAKE ONE TABLET BY MOUTH ONCE DAILY    Class: E-Prescribe    senna-docusate (SENOKOT-S/PERICOLACE) 8.6-50 MG tablet  60 tablet 0 10/5/2020    [facility] - [place], [place] - [address] [facility] [address]    Sig: Take 1 tablet by mouth 2 times daily (hold for loose stools)    Class: E-Prescribe    Route: Oral    sirolimus (GENERIC EQUIVALENT) 0.5 MG tablet  120 tablet 11 10/8/2021    [facility] - [place] [address] [address]    Sig: Take 4 tablets (2 mg) by mouth daily    Class: E-Prescribe    Notes to Pharmacy: TXP DT 7/19/2020 (Heart) TXP Dischg DT 8/3/2020 DX Heart  transplant Z94.1 TX Center [facility] ([place], [place])    Route: Oral    Prior authorization: Closed - Other    sulfamethoxazole-trimethoprim (BACTRIM) 400-80 MG tablet  90 tablet 3 8/26/2021    [facility] - [place] [address] [address]     Sig: TAKE ONE TABLET BY MOUTH ONCE DAILY    Class: E-Prescribe    tacrolimus (GENERIC EQUIVALENT) 1 MG capsule  240 capsule 11 10/8/2021    [facility] - [place] [address]    Sig: Take 4 capsules (4 mg) by mouth every morning AND 4 capsules (4 mg) every evening.    Class: E-Prescribe    Notes to Pharmacy: TXP DT 7/19/2020 (Heart) TXP Dischg DT 8/3/2020 DX Heart transplant Z94.1 TX Center [facility] ([place], [place])    Route: Oral    Prior authorization: Closed - Other    tamsulosin (FLOMAX) 0.4 MG capsule  90 capsule 3 8/18/2021    [facility] - [place] [address] [address]    Sig: Take 1 capsule (0.4 mg) by mouth daily    Class: E-Prescribe    Route: Oral      Clinic-Administered Medications as of 11/17/2021       Dose Frequency Start End    bevacizumab (AVASTIN) intravitreal inj 1.25 mg 1.25 mg EVERY 28 DAYS 11/3/2021     Admin Instructions: PRN Q3-52 weeks  RE    Route: Intravitreal          ROS:   Constitutional: No fever, chills, or sweats.  Stable weight.   ENT: No visual disturbance, ear ache, epistaxis, sore throat.   Allergies/Immunologic: Negative.   Respiratory: As per HPI.   Cardiovascular: As per HPI.   GI: No nausea, vomiting, hematemesis, melena, or hematochezia.   : No urinary frequency, dysuria, or hematuria.   Integument: Negative.   Psychiatric: Negative.   Neuro: Negative.   Endocrinology: Negative.   Musculoskeletal: As per HPI    Exam:  BP (!) 145/85 (BP Location: Left arm, Patient Position: Chair, Cuff Size: Adult Regular)   Pulse 95   Ht 1.651 m (5' 5\")   Wt 86.2 kg (190 lb)   SpO2 97%   BMI 31.62 kg/m    In general, the patient is a pleasant male in no apparent distress.    HEENT: NC/AT. PERRLA. EOMI.  Sclerae white, not injected.    Neck:  No adenopathy, No thyromegaly.    COR: JVD at clavicle when sitting upright.  RRR.  Normal S1 " S2 splits physiologically.  No murmur, rub click, or gallop.    Lungs:  CTA. No rhonchi.    Abdomen: soft, nontender, nondistended.  No organomegaly.  Extremities:  No clubbing or cyanosis, trace-mild bilateral LE edema.    Neuro: Alert & Oriented x 3, grossly non focal.  Integument: No open lesions, rash or jaundice.    Labs:  CBC RESULTS:   Lab Results   Component Value Date    WBC 5.2 11/17/2021    WBC 5.1 05/11/2021    RBC 4.18 (L) 11/17/2021    RBC 4.47 05/11/2021    HGB 12.0 (L) 11/17/2021    HGB 13.4 05/11/2021    HCT 37.0 (L) 11/17/2021    HCT 41.5 05/11/2021    MCV 89 11/17/2021    MCV 93 05/11/2021    MCH 28.7 11/17/2021    MCH 30.0 05/11/2021    MCHC 32.4 11/17/2021    MCHC 32.3 05/11/2021    RDW 13.7 11/17/2021    RDW 13.2 05/11/2021     11/17/2021     05/11/2021       BMP RESULTS:  Lab Results   Component Value Date     11/17/2021     05/11/2021    POTASSIUM 4.7 11/17/2021    POTASSIUM 4.0 05/11/2021    CHLORIDE 109 11/17/2021    CHLORIDE 107 05/11/2021    CO2 27 11/17/2021    CO2 27 05/11/2021    ANIONGAP 3 11/17/2021    ANIONGAP 6 05/11/2021     (H) 11/17/2021    GLC 98 05/11/2021    BUN 30 11/17/2021    BUN 34 (H) 05/11/2021    CR 2.19 (H) 11/17/2021    CR 1.84 (H) 05/11/2021    GFRESTIMATED 30 (L) 11/17/2021    GFRESTIMATED 37 (L) 05/11/2021    GFRESTBLACK 43 (L) 05/11/2021    BRYANNA 9.1 11/17/2021    BRYANNA 9.0 05/11/2021      LIPID RESULTS:  Lab Results   Component Value Date    CHOL 127 11/17/2021    CHOL 133 01/05/2021    HDL 35 (L) 11/17/2021    HDL 40 01/05/2021    LDL 28 11/17/2021    LDL 58 01/05/2021    TRIG 322 (H) 11/17/2021    TRIG 179 (H) 01/05/2021    CHOLHDLRATIO 2.6 06/27/2015    NHDL 92 11/17/2021    NHDL 94 01/05/2021       IMMUNOSUPPRESSANT LEVELS  Lab Results   Component Value Date    TACROL 4.4 (L) 10/15/2021    TACROL 5.6 05/11/2021    DOSTAC 10/15/2021 10/15/2021    DOSTAC  7pm 5/10/21 05/11/2021    RAPAMY 5.3 10/15/2021       No components found  for: CK  Lab Results   Component Value Date    MAG 1.9 11/17/2021    MAG 1.8 05/11/2021     Lab Results   Component Value Date    A1C 7.3 (H) 11/17/2021    A1C 6.7 (H) 01/14/2021     Lab Results   Component Value Date    PHOS 2.4 (L) 11/17/2021    PHOS 3.2 05/11/2021     Lab Results   Component Value Date    NTBNPI 8,792 (H) 09/22/2020     Lab Results   Component Value Date    SAITESTMET [facility] 07/19/2021    SAITESTMET [facility] 01/05/2021    SAICELL Class I 07/19/2021    SAICELL Class I 01/05/2021    [id number] None 07/19/2021    [id number] None 01/05/2021    [id number] None 07/19/2021    [id number] None 01/05/2021    SAIREPCOM  07/19/2021      Test performed by modified procedure. Serum heat inactivated and tested by a modified ([place]) protocol including fetal calf serum addition. High-risk, mfi >3,000. Mod-risk, mfi 500-3,000.    SAIREPCOM  01/05/2021      Test performed by modified procedure. Serum heat inactivated and tested   by a modified ([place]) protocol including fetal calf serum addition.   High-risk, mfi >3,000. Mod-risk, mfi 500-3,000.       Lab Results   Component Value Date    SAIITESTME [facility] 07/19/2021    SAIITESTME [facility] 01/05/2021    SAIICELL Class II 07/19/2021    SAIICELL Class II 01/05/2021    [id number] None 07/19/2021    [id number] None 01/05/2021    [id number] DR:11 07/19/2021    [id number] None 01/05/2021    SAIIREPCOM  07/19/2021      Test performed by modified procedure. Serum heat inactivated and tested by a modified ([place]) protocol including fetal calf serum addition. High-risk, mfi >3,000. Mod-risk, mfi 500-3,000.    SAIIREPCOM  01/05/2021      Test performed by modified procedure. Serum heat inactivated and tested   by a modified ([place]) protocol including fetal calf serum addition.   High-risk, mfi >3,000. Mod-risk, mfi 500-3,000.       Lab Results   Component Value Date    CSPEC EDTA PLASMA 05/11/2021       Assessment and Plan:  Mr. Lucian Oliveira is a 66yr old male with a history of CAD  (s/p 3v CABG 2008), ICM s/p OHT 7/19/20, DMII, CKD, and HTN who presents to clinic for routine follow up.  A professional  was used for this visit.    Labs today show stable electrolytes.  Creatinine up to 2.19.  Liver function and blood counts remained stable.  HgbA1c down to 7.3%.  Fasting lipid profile shows elevated triglycerides at 322.  CBC shows stable blood counts.  CMV, AlloMap, sirolimus, and tacrolimus levels are in process.    Echocardiogram from today shows stable graft function, with LVEF 60-65%, normal RV size/function, and normal valvular function.    Mr. Oliveira appears well today.  His weight trend remained stable, and he appears euvolemic.  His blood pressures remain somewhat controlled, per his home report.    As his renal function has continued to deteriorate, will rediscuss decreasing Bactrim therapy to 3x/week with Dr. Sheridan.  Advised him that we will update him with further directions after I speak with Dr. Sheridan.    Otherwise, advised that he continue his current medications, with no changes.  We will plan for him to follow-up in clinic per protocol, or sooner should new concerns arise.      ICM, s/p OHT 7/19/20  His post-operative course was c/b primary graft dysfunction (LVEF 20-25% and severe RV dysfunction, thought to be secondary to prolonged ischemic time, resolved by f/up TTE 7/27/20), VT, pericardial effusion (incidentally found per TTE 7/27, decreased size noted on TTE 7/29), donor streptococcus salivarius bacteremia (s/p 7d course of ceftriaxone), and RUE DVT c/b HIT (s/p PLEX).  He ultimately discharged 8/3/20.    Rejection history:  none  AlloMap scores:  <35  DSAs:  none  Coronary angio/Ischemic eval: Baseline coronary angiogram 7/2020 showed normal coronary arteries, with no angiographic signs of obstructive CAV.  Last RHC:  9/28/21 showed normal biventricular filling pressures, with RA 10, mPA 24, PCW 12, and CI 2.6.  Echo/cMRI: Last TTE 7/2021 showed stable  "graft, function, with LVEF 60-65%, normal RV size/function, and no valvular disease.    Serostatus:  - CMV D+/R+  - EBV D+/R+  - Toxo D+/R-     Immunosuppression:  - Sirolimus, goal level 3-5.  Level today in process.  - tacro, goal level 3-5.  Level today in process.     PPx:  - CAV:  Aspirin 81mg daily and rosuvastatin 10mg daily  - GI:  Pantoprazole 40mg daily  - Osteoporosis:  Calcium/vitamin D supplements  - Toxo:  Single strength bactrim, lifelong ppx --> will discuss decreasing dose to 3x/week with Dr. Sheridan, due to renal function     Graft function:  - BPs:  Stable, continue hydral 100mg TID  - fluid status:  Euvolemic, taking lasix 20mg daily    Health maintenance:  - derm exam -- never seen  - eye exam UTD   - dental exam overdue, last exam was over 1 year ago  - last colo 2019, due 2022  - completed COVID shots, 3/3  - received flu shot   - pneumonia shots unclear --> discuss with PCP  - needs shingrix      Hypertriglyceridemia  Triglycerides are 322 today.  Will review ongoing plan with Dr. Sheridan.  Again discussed the importance of heart healthy dieting and regular aerobic activity.     DMII  Follows with endo and PCP.  HgbA1c down slightly today.     HIT  HIT antibody + 7/2020.  CORRINE +.  No heparin products.     RIJ and axillary vein DVT, nonocclusive  Right cephalic vein occlusive thrombus  S/p course of anticoagulation.     Total retinal detachment of the left eye, s/p retinectomy 12/21/20  Proliferative diabetic retinopathy  Right vitreous hemorrhage  He presented to the ED 12/9/20 with right vision loss.  Ophthalmology was consulted, and found that he had a vitreous hemorrhage in his right eye and total retinal detachment of his left eye.  He was then seen in the eye clinic 12/10/20, where he was advised an Avastin injection in the right eye (for proliferative diabetic retinopathy and vitreous hemorrhage).  He then underwent \"27 gauge pars plana vitectomy, membrane peel, perfluoroctane liquid " "(PFO), retinectomy, endolaser\" 12/21/20 for the tractional retinal detachment of his left eye.  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         The above was reviewed with Mr. Oliveira and his wife via professional .  Theyverbalized understanding and will call with further questions/concerns.    45 minutes spent with patient, along with preparing for visit, reviewing follow up, and completing documentation.      Arlin Guajardo, SONA, FNP-BC, CHFN  Advanced Heart Failure Nurse Practitioner  [facility]  Patient Care Team:  No Ref-Primary, Physician as PCP - Diane Groves APRN CNP as Nurse Practitioner (Cardiology)  Yeu Dowd MD as MD (INTERNAL MEDICINE - ENDOCRINOLOGY, DIABETES & METABOLISM)  Christelle Pettit, RN as Transplant Coordinator (Cardiology)  Negrito Rai [facility] as Pharmacist (Pharmacist)  [facility] ([facility]  Triny Bunch MD as Assigned Surgical Provider  Marisabel Prieto PA-C as Assigned Endocrinology Provider  Edita Epsitia MD as Assigned PCP      "

## 2021-11-19 ENCOUNTER — TELEPHONE (OUTPATIENT)
Dept: TRANSPLANT | Facility: CLINIC | Age: 66
End: 2021-11-19
Payer: COMMERCIAL

## 2021-11-19 DIAGNOSIS — Z94.1 HEART REPLACED BY TRANSPLANT (H): Primary | ICD-10-CM

## 2021-11-19 DIAGNOSIS — Z94.1 HEART TRANSPLANT, ORTHOTOPIC, STATUS (H): ICD-10-CM

## 2021-11-20 DIAGNOSIS — Z79.4 TYPE 2 DIABETES MELLITUS WITH HYPERGLYCEMIA, WITH LONG-TERM CURRENT USE OF INSULIN (H): ICD-10-CM

## 2021-11-20 DIAGNOSIS — E11.65 TYPE 2 DIABETES MELLITUS WITH HYPERGLYCEMIA, WITH LONG-TERM CURRENT USE OF INSULIN (H): ICD-10-CM

## 2021-11-22 ENCOUNTER — APPOINTMENT (OUTPATIENT)
Dept: INTERPRETER SERVICES | Facility: CLINIC | Age: 66
End: 2021-11-22
Payer: COMMERCIAL

## 2021-11-22 DIAGNOSIS — Z94.1 HEART REPLACED BY TRANSPLANT (H): Primary | ICD-10-CM

## 2021-11-22 LAB
ALLOMAP SCORE (EXTERNAL): 31 (ref 0–40)
NEGATIVE PREDICTIVE VALUE PERCENT (EXTERNAL): 98.8 %
POSITIVE PREDICTIVE VALUE PERCENT (EXTERNAL): 2.3 %

## 2021-11-22 RX ORDER — TACROLIMUS 1 MG/1
CAPSULE ORAL
Qty: 210 CAPSULE | Refills: 11 | Status: SHIPPED | OUTPATIENT
Start: 2021-11-22 | End: 2021-11-30

## 2021-11-22 NOTE — TELEPHONE ENCOUNTER
sitagliptin (JANUVIA) 100 MG tablet   Last Written Prescription Date: 8/24/21  Last Fill Quantity: 90,   # refills: 0  Last Office Visit : 10/19/ 21  [facility]  Future Office visit:  12/21/21      Routing refill request to provider for review/approval because: med end date  9/28/21 by other

## 2021-11-22 NOTE — TELEPHONE ENCOUNTER
Pt called with remaining lab results from last week's visit.  Rapa level at goal 4.8.  Goal 3-5.  No dose changes.  Tacro level 5.8- Goal 3-5.  Dose decreased to 4/3.  Pt to recheck a level in one week.  Pt called with above instructions using .  Pt states understanding.

## 2021-11-23 RX ORDER — SITAGLIPTIN 100 MG/1
TABLET, FILM COATED ORAL
Qty: 90 TABLET | Refills: 0 | OUTPATIENT
Start: 2021-11-23

## 2021-11-30 ENCOUNTER — LAB (OUTPATIENT)
Dept: LAB | Facility: CLINIC | Age: 66
End: 2021-11-30
Payer: COMMERCIAL

## 2021-11-30 ENCOUNTER — TELEPHONE (OUTPATIENT)
Dept: TRANSPLANT | Facility: CLINIC | Age: 66
End: 2021-11-30

## 2021-11-30 DIAGNOSIS — Z94.1 HEART REPLACED BY TRANSPLANT (H): ICD-10-CM

## 2021-11-30 DIAGNOSIS — Z94.1 HEART REPLACED BY TRANSPLANT (H): Primary | ICD-10-CM

## 2021-11-30 DIAGNOSIS — Z94.1 HEART TRANSPLANT, ORTHOTOPIC, STATUS (H): ICD-10-CM

## 2021-11-30 LAB
ANION GAP SERPL CALCULATED.3IONS-SCNC: 10 MMOL/L (ref 3–14)
BUN SERPL-MCNC: 31 MG/DL (ref 7–30)
CALCIUM SERPL-MCNC: 9.2 MG/DL (ref 8.5–10.1)
CHLORIDE BLD-SCNC: 105 MMOL/L (ref 94–109)
CO2 SERPL-SCNC: 26 MMOL/L (ref 20–32)
CREAT SERPL-MCNC: 2.31 MG/DL (ref 0.66–1.25)
GFR SERPL CREATININE-BSD FRML MDRD: 28 ML/MIN/1.73M2
GLUCOSE BLD-MCNC: 129 MG/DL (ref 70–99)
POTASSIUM BLD-SCNC: 4.3 MMOL/L (ref 3.4–5.3)
SODIUM SERPL-SCNC: 141 MMOL/L (ref 133–144)
TACROLIMUS BLD-MCNC: 5.1 UG/L (ref 5–15)
TME LAST DOSE: NORMAL H
TME LAST DOSE: NORMAL H

## 2021-11-30 PROCEDURE — 80197 ASSAY OF TACROLIMUS: CPT | Performed by: INTERNAL MEDICINE

## 2021-11-30 PROCEDURE — 36415 COLL VENOUS BLD VENIPUNCTURE: CPT | Performed by: PATHOLOGY

## 2021-11-30 PROCEDURE — 80048 BASIC METABOLIC PNL TOTAL CA: CPT | Performed by: PATHOLOGY

## 2021-11-30 RX ORDER — TACROLIMUS 1 MG/1
CAPSULE ORAL
Qty: 180 CAPSULE | Refills: 11 | Status: SHIPPED | OUTPATIENT
Start: 2021-11-30 | End: 2022-11-28

## 2021-11-30 RX ORDER — SULFAMETHOXAZOLE AND TRIMETHOPRIM 400; 80 MG/1; MG/1
1 TABLET ORAL
Qty: 36 TABLET | Refills: 3 | Status: SHIPPED | OUTPATIENT
Start: 2021-11-30 | End: 2023-05-08

## 2021-11-30 NOTE — TELEPHONE ENCOUNTER
Pt called using  with BMP and Tacro results.  Dr. Sheridan reviewed Creat of 2.3.  Pt to decrease Bactrim frequency to 3 times a week.  Tacro level 5.1.  Goal 3-5.  Dose decrease to 3/3.  Pt to recheck a level with a BMP in one week.    Pt and wife able to repeat back instructions and plan of care.  Pt's daughter Elisabeth also updated.

## 2021-12-01 ENCOUNTER — APPOINTMENT (OUTPATIENT)
Dept: INTERPRETER SERVICES | Facility: CLINIC | Age: 66
End: 2021-12-01
Payer: COMMERCIAL

## 2021-12-01 DIAGNOSIS — Z94.1 HEART TRANSPLANT, ORTHOTOPIC, STATUS (H): ICD-10-CM

## 2021-12-01 RX ORDER — AMOXICILLIN 250 MG
1 CAPSULE ORAL 2 TIMES DAILY
Qty: 60 TABLET | Refills: 3 | Status: SHIPPED | OUTPATIENT
Start: 2021-12-01

## 2021-12-01 NOTE — TELEPHONE ENCOUNTER
Pt requesting Rx for Senna-Lax 8.6mg tabs    Thank you!  Cely Ahn [facility]

## 2021-12-08 ENCOUNTER — LAB (OUTPATIENT)
Dept: LAB | Facility: CLINIC | Age: 66
End: 2021-12-08
Payer: COMMERCIAL

## 2021-12-08 DIAGNOSIS — Z94.1 HEART REPLACED BY TRANSPLANT (H): ICD-10-CM

## 2021-12-08 LAB
ANION GAP SERPL CALCULATED.3IONS-SCNC: 8 MMOL/L (ref 3–14)
BUN SERPL-MCNC: 25 MG/DL (ref 7–30)
CALCIUM SERPL-MCNC: 8.7 MG/DL (ref 8.5–10.1)
CHLORIDE BLD-SCNC: 107 MMOL/L (ref 94–109)
CO2 SERPL-SCNC: 25 MMOL/L (ref 20–32)
CREAT SERPL-MCNC: 2.03 MG/DL (ref 0.66–1.25)
GFR SERPL CREATININE-BSD FRML MDRD: 33 ML/MIN/1.73M2
GLUCOSE BLD-MCNC: 107 MG/DL (ref 70–99)
POTASSIUM BLD-SCNC: 4.1 MMOL/L (ref 3.4–5.3)
SODIUM SERPL-SCNC: 140 MMOL/L (ref 133–144)
TACROLIMUS BLD-MCNC: 4.5 UG/L (ref 5–15)
TME LAST DOSE: ABNORMAL H
TME LAST DOSE: ABNORMAL H

## 2021-12-08 PROCEDURE — 80197 ASSAY OF TACROLIMUS: CPT | Performed by: INTERNAL MEDICINE

## 2021-12-08 PROCEDURE — 80048 BASIC METABOLIC PNL TOTAL CA: CPT | Performed by: PATHOLOGY

## 2021-12-08 PROCEDURE — 36415 COLL VENOUS BLD VENIPUNCTURE: CPT | Performed by: PATHOLOGY

## 2021-12-09 ENCOUNTER — TELEPHONE (OUTPATIENT)
Dept: TRANSPLANT | Facility: CLINIC | Age: 66
End: 2021-12-09
Payer: COMMERCIAL

## 2021-12-09 NOTE — TELEPHONE ENCOUNTER
Pt called with Tacro level results using .  Tacro level 4.5.  Goal 3-5.  Pt to continue current dose of Tacro, and recheck at next visit.  Pt states understanding.

## 2021-12-17 NOTE — PROGRESS NOTES
Diabetes Virtual Consult Note  Marisabel Prieto PA-C  December 21, 2021          Lucian Henderson Sr. is a 66 year old male with a past medical history including  has a past medical history of CAD (coronary artery disease), CHF (congestive heart failure) (H), CKD (chronic kidney disease), stage III (H), Cortical cataract of both eyes, Diabetes (H), Hyperlipidemia, Hypertension, Infection due to Streptococcus mitis group (09/23/2020), Ischemic cardiomyopathy, Obesity, CHANTEL (obstructive sleep apnea), and Osteoarthritis.    He has no past medical history of Chronic infection, Complication of anesthesia, COPD (chronic obstructive pulmonary disease) (H), Heart murmur, Irregular heart beat, Liver disease, Other chronic pain, or Uncomplicated asthma.  He is  s/p heart transplant on 7/19/2020 and left eye retinectomy on 12/21/20.    Feb 2021:  At that time BG was reasonably controlled early in the morning but with BG later in the day above goal.  I suspect some of this is due to varying NPH dose in the morning.      Advised:  NPH 35 units every morning regardless of fasting BG to prevent higher BG later and 11 units each evening.  Increase evening dose if fasting ALWAYS >105.    Continue 15 units Novolog with lunch + sliding scale insulin 2u:30 >140  With dinner give Novolog 10 u  - 200, 12 for  - 229, or 14u for BG >230.    Oct 2021:  Lucian had type 2 diabetes complicated by ICM and heart transplant, CKD, obesity, retinopathy that is reasonably controlled early in the morning but with BG later in the day remains far above goal.    Advised:  Increase NPH to 40 qam, 12 qpm.  R/B of Trulicity reviewed and will start 0.75 mg.        Interim:  He has not been hospitalized.  He did see ophtha 11/3/2021.  They note Proliferative Diabetic Retinopathy, each eye   # Vitreous hemorrhage, right eye -  Resolving (onset 4/2021)              - multiple Avastin (last Avastin 9/8/2021)              - status post Panretinal  laser photocoagulation (PRP) filling right eye: 7/21/2021; both eyes 6/19   Saw cardiology in Nov.  Considered decreasing   Bactrim due to Cr 2.19    Today:     He feels BG high as hasn't been out to walk due to cold weather.  He is using stationary bike at home 20 minutes in the morning, but when he was walking they were much more active.  He did get Trulicity and lost 15 pounds which he is happy about - he feels light.  Now that he is not going out and home more he is eating more nonetheless.  Weighed 179 yesterday.    Shivani notices he CANNOT eat Chinese food as BG goes TOO high.  When they do give Novolog it does come down.  At times Lucian does not want to give Novolog.   Feels good on Trulicity, he would like to increase.      Breakfast 9:30 am.    Current DM Regimen:    NPH 40 u qam around 8:30 or 9 am plus sliding scale, 12 u qpm at 9 pm.  Novolog SSI tid. sliding scale insulin 2u:30 >140 with lunch around 1:30 or 3 pm , dinner  7 or 8 pm give Novolog 10 u  - 200, 12 for  - 229, or 14u for BG >230.    They do not give Novolog in evening for fear of low overnight.  Last low overnight was prior to last visit in early October.      He is eating three times daily, but at times Shivani sleeps earlier and she will give the long acting before she sleeps.    They DO NOT give any Novolog with dinner.        We reviewed glucometer, pump and CGMS data together.  It revealed:    12/6/21  8:00am: 149  4:00pm: 186    12/7/21  8:00am: 261  4:00pm: 221    12/8/21  8:00am: 87  4:00pm: 212    12/9/21  8:00am: 156  4:00pm: 236    12/10/21  8:00am: 173  4:00pm: 312    12/11/21  8:00am: 182  4:00pm: 342    12/12/21  8:00am: 191  4:00pm: 305  8:00pm: 211    12/13/21  8:00am: 175  4:00pm: 266  8:00pm: 187    12/14/21  8:00am: 142  4:00pm: 369    12/15/21  8:00am: 225  4:00pm: 325  8:00pm: 225    12/16/21  8:00am: 169  4:00pm: 347  8:00pm: 135    12/17/21  8:00am: 135  4:00pm: 277  8:00pm: 135    12/18/21  8:00am:  185  4:00pm: 255    12/19/21  8:00am: 130  4:00pm: 245            History of Diabetes monitoring and complications/ prevention:  CAD: Yes  Last eye exam results: 7/21/2021.  Did PRP an Avastatin and discussed surgical options for right eye which has hx vitreous hemorrhage.    Last dental exam: Reports UTD  Microalbuminuria: 6 October 2018  HTN: Yes  On Statin: Yes  On Aspirin: Yes  Depression: No - worried, but happy. Hopeful.    ED: yes, limited concerned at this time     Janice  has a past medical history of CAD (coronary artery disease), CHF (congestive heart failure) (H), CKD (chronic kidney disease), stage III (H), Cortical cataract of both eyes, Diabetes (H), Hyperlipidemia, Hypertension, Infection due to Streptococcus mitis group (09/23/2020), Ischemic cardiomyopathy, Obesity, CHANTEL (obstructive sleep apnea), and Osteoarthritis.    He has no past medical history of Chronic infection, Complication of anesthesia, COPD (chronic obstructive pulmonary disease) (H), Heart murmur, Irregular heart beat, Liver disease, Other chronic pain, or Uncomplicated asthma.  Current Outpatient Medications   Medication     acetaminophen (TYLENOL) 325 MG tablet     Alcohol Swabs PADS     aspirin (ASA) 81 MG chewable tablet     atropine 1 % ophthalmic solution     blood glucose (NO BRAND SPECIFIED) test strip     blood glucose monitoring (ACCU-CHEK FELIPE SMARTVIEW) meter device kit     blood glucose monitoring (SOFTCLIX) lancets     calcium carbonate 600 mg-vitamin D 400 units (CALTRATE) 600-400 MG-UNIT per tablet     Calcium Carbonate-Vitamin D3 600-400 MG-UNIT TABS     dulaglutide (TRULICITY) 0.75 MG/0.5ML pen     furosemide (LASIX) 20 MG tablet     HUMALOG KWIKPEN 100 UNIT/ML soln     hydrALAZINE (APRESOLINE) 100 MG tablet     insulin aspart (NOVOLOG PEN) 100 UNIT/ML pen     insulin isophane human (HUMULIN N PEN) 100 UNIT/ML injection     insulin isophane human (HUMULIN N PEN) 100 UNIT/ML injection     insulin pen needle (32G X 4  MM) 32G X 4 MM miscellaneous     latanoprost (XALATAN) 0.005 % ophthalmic solution     magnesium oxide (MAG-OX) 400 (241.3 Mg) MG tablet     prednisoLONE acetate (PRED FORTE) 1 % ophthalmic suspension     prednisoLONE acetate (PRED FORTE) 1 % ophthalmic suspension     rosuvastatin (CRESTOR) 10 MG tablet     senna-docusate (SENOKOT-S/PERICOLACE) 8.6-50 MG tablet     sirolimus (GENERIC EQUIVALENT) 0.5 MG tablet     sulfamethoxazole-trimethoprim (BACTRIM) 400-80 MG tablet     tacrolimus (GENERIC EQUIVALENT) 1 MG capsule     tamsulosin (FLOMAX) 0.4 MG capsule     Continuous Blood Gluc Sensor (FREESTYLE JEREMY 14 DAY SENSOR) MISC     Continuous Blood Gluc Sensor (FREESTYLE JEREMY 14 DAY SENSOR) MISC     neomycin-polymixin-dexamethasone (MAXITROL) 0.1 % ophthalmic suspension     neomycin-polymixin-dexamethasone (MAXITROL) ophthalmic ointment     order for DME     pantoprazole (PROTONIX) 40 MG EC tablet     Current Facility-Administered Medications   Medication     bevacizumab (AVASTIN) intravitreal inj 1.25 mg         Lucian's family history includes Diabetes in his brother, sister, and sister.    ROS:   Patient denies any fevers, chills or sweats as well as any changes in or problems with vision, pain or problems with dentition, new or different headaches.  Patient denies symptoms of hypo and hyperglycemia except as above.   Patients denies marked fatigue, cough, shortness of breath, chest pain or pressure.  There has been no pain with or other changes in urination or  itching or pain in genital areas.  Patient denies any noted swelling in feet, ankles or otherwise, loss of sensation or pain  in feet or other areas.    Patient also denies current difficulties with depressed mood, anhedonia or worrying too much.  He is tired of COVID precautions, but feels god and optimistic.        Exam:      Wt Readings from Last 10 Encounters:   11/17/21 86.2 kg (190 lb)   10/19/21 90.4 kg (199 lb 4.8 oz)   09/28/21 87.5 kg (193 lb)  "  07/20/21 84.8 kg (187 lb)   07/19/21 86.2 kg (190 lb)   05/11/21 82.5 kg (181 lb 14.4 oz)   03/02/21 81.2 kg (179 lb)   01/05/21 82.4 kg (181 lb 9.6 oz)   12/21/20 82 kg (180 lb 12.4 oz)   12/09/20 80.7 kg (178 lb)     Video visit due to COVID precautions.      This is a warm and pleasant obese individual in no acute distress - with spouse in home.  Mood is \"good,\"  affect is appropriate,    Her eyes are clear without proptosis, with healthy appearing lens, conjunctiva and sclera.  EOMs intact.  Nares are patent.  Neck is supple without mass or JVD noted.    Respirations are easy and unlabored.  Skin is warm moist and elastic without lesions noted.         Data:      Recent Labs   Lab Test 12/08/21  0845 11/30/21  0859 11/17/21  1126 11/17/21  0848 10/15/21  0826 10/08/21  0850 09/28/21  1031 08/10/20  0845 08/05/20  0843 07/07/20  1732 07/07/20  0720 10/16/18  0904 10/03/18  0725 08/16/18  0834 08/06/18  0000 04/30/18  1148 04/30/18  0000   A1C  --   --   --  7.3* 7.8*  --   --    < >  --   --   --    < >  --   --   --   --   --    HEMOGLOBINA1  --   --   --   --   --   --   --   --   --   --   --   --   --   --  8.3*  --  10.6*   TSH  --   --   --   --   --   --   --   --  0.80  --  1.30   < >  --   --   --   --   --    LDL  --   --   --  28  --   --  58   < >  --   --   --    < >  --    < >  --   --   --    HDL  --   --   --  35*  --   --  37*   < >  --   --   --    < >  --    < >  --   --   --    TRIG  --   --   --  322*  --   --  278*   < >  --   --   --    < >  --    < >  --   --   --    CR 2.03* 2.31*  --  2.19* 1.98*   < > 2.06*   < > 1.70*   < >  --    < >  --    < >  --    < >  --    MICROL  --   --  146  --   --   --   --   --   --   --   --   --  6  --   --   --   --     < > = values in this interval not displayed.     GFR Estimate   Date Value Ref Range Status   12/08/2021 33 (L) >60 mL/min/1.73m2 Final     Comment:     As of July 11, 2021, eGFR is calculated by the CKD-EPI creatinine equation, " without race adjustment. eGFR can be influenced by muscle mass, exercise, and diet. The reported eGFR is an estimation only and is only applicable if the renal function is stable.   11/30/2021 28 (L) >60 mL/min/1.73m2 Final     Comment:     As of July 11, 2021, eGFR is calculated by the CKD-EPI creatinine equation, without race adjustment. eGFR can be influenced by muscle mass, exercise, and diet. The reported eGFR is an estimation only and is only applicable if the renal function is stable.   11/17/2021 30 (L) >60 mL/min/1.73m2 Final     Comment:     As of July 11, 2021, eGFR is calculated by the CKD-EPI creatinine equation, without race adjustment. eGFR can be influenced by muscle mass, exercise, and diet. The reported eGFR is an estimation only and is only applicable if the renal function is stable.   10/15/2021 34 (L) >60 mL/min/1.73m2 Final     Comment:     As of July 11, 2021, eGFR is calculated by the CKD-EPI creatinine equation, without race adjustment. eGFR can be influenced by muscle mass, exercise, and diet. The reported eGFR is an estimation only and is only applicable if the renal function is stable.   10/08/2021 33 (L) >60 mL/min/1.73m2 Final     Comment:     As of July 11, 2021, eGFR is calculated by the CKD-EPI creatinine equation, without race adjustment. eGFR can be influenced by muscle mass, exercise, and diet. The reported eGFR is an estimation only and is only applicable if the renal function is stable.   05/11/2021 37 (L) >60 mL/min/[1.73_m2] Final     Comment:     Non  GFR Calc  Starting 12/18/2018, serum creatinine based estimated GFR (eGFR) will be   calculated using the Chronic Kidney Disease Epidemiology Collaboration   (CKD-EPI) equation.     03/02/2021 37 (L) >60 mL/min/[1.73_m2] Final     Comment:     Non  GFR Calc  Starting 12/18/2018, serum creatinine based estimated GFR (eGFR) will be   calculated using the Chronic Kidney Disease Epidemiology  Collaboration   (CKD-EPI) equation.     01/14/2021 41 (L) >60 mL/min/[1.73_m2] Final     Comment:     Non  GFR Calc  Starting 12/18/2018, serum creatinine based estimated GFR (eGFR) will be   calculated using the Chronic Kidney Disease Epidemiology Collaboration   (CKD-EPI) equation.     01/05/2021 41 (L) >60 mL/min/[1.73_m2] Final     Comment:     Non  GFR Calc  Starting 12/18/2018, serum creatinine based estimated GFR (eGFR) will be   calculated using the Chronic Kidney Disease Epidemiology Collaboration   (CKD-EPI) equation.     12/09/2020 35 (L) >60 mL/min/[1.73_m2] Final     Comment:     Non  GFR Calc  Starting 12/18/2018, serum creatinine based estimated GFR (eGFR) will be   calculated using the Chronic Kidney Disease Epidemiology Collaboration   (CKD-EPI) equation.         Hemoglobin   Date Value Ref Range Status   11/17/2021 12.0 (L) 13.3 - 17.7 g/dL Final   05/11/2021 13.4 13.3 - 17.7 g/dL Final   ]        Assessment/Plan:    Lucian is a 65 year old male with  has a past medical history of CAD (coronary artery disease), CHF (congestive heart failure) (H), CKD (chronic kidney disease), stage III (H), Cortical cataract of both eyes, Diabetes (H), Hyperlipidemia, Hypertension, Infection due to Streptococcus mitis group (09/23/2020), Ischemic cardiomyopathy, Obesity, CHANTEL (obstructive sleep apnea), and Osteoarthritis.    He has no past medical history of Chronic infection, Complication of anesthesia, COPD (chronic obstructive pulmonary disease) (H), Heart murmur, Irregular heart beat, Liver disease, Other chronic pain, or Uncomplicated asthma.  He is  s/p heart transplant on 7/19/2020 and left eye retinectomy on 12/21/20.    Lucian has type 2 diabetes complicated by ICM and heart transplant, CKD ( - GFR 28- 34 in last 6 m) , obesity, retinopathy that is reasonably controlled with A1C 7.3% last month though fictitiously depressed likely with Hgb.  Novolog  corrections seem to be working and BG generally at goal with elevations largely related to diet.    Advised:  Continue  NPH to 40 qam, 12 qpm, Novolog 2:30 >140 with breakfast and lunch.  We may need to resume sliding scale insulin with dinner, but for now will trial increase in Trulicity to 1.5 mg and RTC 2-3 months.  They defer CDE at this time as wish to stay home for winter.        39 minutes in preparation for visit reviewing chart, labs and documentation, visiting with patient gathering history and in exam from both patient and spouse, education and counseling, as well as coordination of care, further chart review and documentation following visit on this date of service and as alluded to documented above.      Activity Duration    Chart accessed 9 minutes    Exam room 20 minutes    Current session 8 minutes    Total time: 39 minutes*         It is my privilege to be involved in the care of the above patient.     Marisabel Prieto PA-C, MPAS  [facility]  Diabetes, Endocrinology, and Metabolism  [phone] Appointments/Nurse  [phone] pager  [phone] nurse line    This note was completed in part using Dragon voice recognition, and may contain word and grammatical errors.    Video: 1:12 - 1:42

## 2021-12-21 ENCOUNTER — VIRTUAL VISIT (OUTPATIENT)
Dept: ENDOCRINOLOGY | Facility: CLINIC | Age: 66
End: 2021-12-21
Payer: COMMERCIAL

## 2021-12-21 DIAGNOSIS — Z79.4 TYPE 2 DIABETES MELLITUS WITH HYPERGLYCEMIA, WITH LONG-TERM CURRENT USE OF INSULIN (H): ICD-10-CM

## 2021-12-21 DIAGNOSIS — E11.65 TYPE 2 DIABETES MELLITUS WITH HYPERGLYCEMIA, WITH LONG-TERM CURRENT USE OF INSULIN (H): ICD-10-CM

## 2021-12-21 DIAGNOSIS — R57.0 CARDIOGENIC SHOCK (H): ICD-10-CM

## 2021-12-21 DIAGNOSIS — I48.92 ATRIAL FLUTTER, UNSPECIFIED TYPE (H): Primary | ICD-10-CM

## 2021-12-21 DIAGNOSIS — N18.32 STAGE 3B CHRONIC KIDNEY DISEASE (H): ICD-10-CM

## 2021-12-21 PROCEDURE — 99214 OFFICE O/P EST MOD 30 MIN: CPT | Mod: GT | Performed by: PHYSICIAN ASSISTANT

## 2021-12-21 RX ORDER — DULAGLUTIDE 1.5 MG/.5ML
1.5 INJECTION, SOLUTION SUBCUTANEOUS
Qty: 2 ML | Refills: 4 | Status: SHIPPED | OUTPATIENT
Start: 2021-12-21 | End: 2022-04-05

## 2021-12-21 NOTE — LETTER
12/21/2021       RE: Lucian Oliveira  [address] [facility] [facility] [address]     Dear Colleague,    Thank you for referring your patient, Lucian Oliveira, to the [facility] ENDOCRINOLOGY CLIN[facility] [place] at [facility]. Please see a copy of my visit note below.    Diabetes Virtual Consult Note  Marisabel Prieto PA-C  December 21, 2021          Lucian Oliveira Santiago Sr. is a 66 year old male with a past medical history including  has a past medical history of CAD (coronary artery disease), CHF (congestive heart failure) (H), CKD (chronic kidney disease), stage III (H), Cortical cataract of both eyes, Diabetes (H), Hyperlipidemia, Hypertension, Infection due to Streptococcus mitis group (09/23/2020), Ischemic cardiomyopathy, Obesity, CHANTEL (obstructive sleep apnea), and Osteoarthritis.    He has no past medical history of Chronic infection, Complication of anesthesia, COPD (chronic obstructive pulmonary disease) (H), Heart murmur, Irregular heart beat, Liver disease, Other chronic pain, or Uncomplicated asthma.  He is  s/p heart transplant on 7/19/2020 and left eye retinectomy on 12/21/20.    Feb 2021:  At that time BG was reasonably controlled early in the morning but with BG later in the day above goal.  I suspect some of this is due to varying NPH dose in the morning.      Advised:  NPH 35 units every morning regardless of fasting BG to prevent higher BG later and 11 units each evening.  Increase evening dose if fasting ALWAYS >105.    Continue 15 units Novolog with lunch + sliding scale insulin 2u:30 >140  With dinner give Novolog 10 u  - 200, 12 for  - 229, or 14u for BG >230.    Oct 2021:  Lucian had type 2 diabetes complicated by ICM and heart transplant, CKD, obesity, retinopathy that is reasonably controlled early in the morning but with BG later in the day remains far above goal.    Advised:  Increase NPH to 40 qam, 12 qpm.  R/B  of Trulicity reviewed and will start 0.75 mg.        Interim:  He has not been hospitalized.  He did see ophtha 11/3/2021.  They note Proliferative Diabetic Retinopathy, each eye   # Vitreous hemorrhage, right eye -  Resolving (onset 4/2021)              - multiple Avastin (last Avastin 9/8/2021)              - status post Panretinal laser photocoagulation (PRP) filling right eye: 7/21/2021; both eyes 6/19   Saw cardiology in Nov.  Considered decreasing   Bactrim due to Cr 2.19    Today:     He feels BG high as hasn't been out to walk due to cold weather.  He is using stationary bike at home 20 minutes in the morning, but when he was walking they were much more active.  He did get Trulicity and lost 15 pounds which he is happy about - he feels light.  Now that he is not going out and home more he is eating more nonetheless.  Weighed 179 yesterday.    Shivani notices he CANNOT eat Chinese food as BG goes TOO high.  When they do give Novolog it does come down.  At times Lucian does not want to give Novolog.   Feels good on Trulicity, he would like to increase.      Breakfast 9:30 am.    Current DM Regimen:    NPH 40 u qam around 8:30 or 9 am plus sliding scale, 12 u qpm at 9 pm.  Novolog SSI tid. sliding scale insulin 2u:30 >140 with lunch around 1:30 or 3 pm , dinner  7 or 8 pm give Novolog 10 u  - 200, 12 for  - 229, or 14u for BG >230.    They do not give Novolog in evening for fear of low overnight.  Last low overnight was prior to last visit in early October.      He is eating three times daily, but at times Shivani sleeps earlier and she will give the long acting before she sleeps.    They DO NOT give any Novolog with dinner.        We reviewed glucometer, pump and CGMS data together.  It revealed:    12/6/21  8:00am: 149  4:00pm: 186    12/7/21  8:00am: 261  4:00pm: 221    12/8/21  8:00am: 87  4:00pm: 212    12/9/21  8:00am: 156  4:00pm: 236    12/10/21  8:00am: 173  4:00pm: 312    12/11/21  8:00am:  182  4:00pm: 342    12/12/21  8:00am: 191  4:00pm: 305  8:00pm: 211    12/13/21  8:00am: 175  4:00pm: 266  8:00pm: 187    12/14/21  8:00am: 142  4:00pm: 369    12/15/21  8:00am: 225  4:00pm: 325  8:00pm: 225    12/16/21  8:00am: 169  4:00pm: 347  8:00pm: 135    12/17/21  8:00am: 135  4:00pm: 277  8:00pm: 135    12/18/21  8:00am: 185  4:00pm: 255    12/19/21  8:00am: 130  4:00pm: 245            History of Diabetes monitoring and complications/ prevention:  CAD: Yes  Last eye exam results: 7/21/2021.  Did PRP an Avastatin and discussed surgical options for right eye which has hx vitreous hemorrhage.    Last dental exam: Reports UTD  Microalbuminuria: 6 October 2018  HTN: Yes  On Statin: Yes  On Aspirin: Yes  Depression: No - worried, but happy. Hopeful.    ED: yes, limited concerned at this time     Janice  has a past medical history of CAD (coronary artery disease), CHF (congestive heart failure) (H), CKD (chronic kidney disease), stage III (H), Cortical cataract of both eyes, Diabetes (H), Hyperlipidemia, Hypertension, Infection due to Streptococcus mitis group (09/23/2020), Ischemic cardiomyopathy, Obesity, CHANTEL (obstructive sleep apnea), and Osteoarthritis.    He has no past medical history of Chronic infection, Complication of anesthesia, COPD (chronic obstructive pulmonary disease) (H), Heart murmur, Irregular heart beat, Liver disease, Other chronic pain, or Uncomplicated asthma.  Current Outpatient Medications   Medication     acetaminophen (TYLENOL) 325 MG tablet     Alcohol Swabs PADS     aspirin (ASA) 81 MG chewable tablet     atropine 1 % ophthalmic solution     blood glucose (NO BRAND SPECIFIED) test strip     blood glucose monitoring (ACCU-CHEK FELIPE SMARTVIEW) meter device kit     blood glucose monitoring (SOFTCLIX) lancets     calcium carbonate 600 mg-vitamin D 400 units (CALTRATE) 600-400 MG-UNIT per tablet     Calcium Carbonate-Vitamin D3 600-400 MG-UNIT TABS     dulaglutide (TRULICITY) 0.75 MG/0.5ML  pen     furosemide (LASIX) 20 MG tablet     HUMALOG KWIKPEN 100 UNIT/ML soln     hydrALAZINE (APRESOLINE) 100 MG tablet     insulin aspart (NOVOLOG PEN) 100 UNIT/ML pen     insulin isophane human (HUMULIN N PEN) 100 UNIT/ML injection     insulin isophane human (HUMULIN N PEN) 100 UNIT/ML injection     insulin pen needle (32G X 4 MM) 32G X 4 MM miscellaneous     latanoprost (XALATAN) 0.005 % ophthalmic solution     magnesium oxide (MAG-OX) 400 (241.3 Mg) MG tablet     prednisoLONE acetate (PRED FORTE) 1 % ophthalmic suspension     prednisoLONE acetate (PRED FORTE) 1 % ophthalmic suspension     rosuvastatin (CRESTOR) 10 MG tablet     senna-docusate (SENOKOT-S/PERICOLACE) 8.6-50 MG tablet     sirolimus (GENERIC EQUIVALENT) 0.5 MG tablet     sulfamethoxazole-trimethoprim (BACTRIM) 400-80 MG tablet     tacrolimus (GENERIC EQUIVALENT) 1 MG capsule     tamsulosin (FLOMAX) 0.4 MG capsule     Continuous Blood Gluc Sensor (FREESTYLE JEREMY 14 DAY SENSOR) MISC     Continuous Blood Gluc Sensor (FREESTYLE JEREMY 14 DAY SENSOR) MISC     neomycin-polymixin-dexamethasone (MAXITROL) 0.1 % ophthalmic suspension     neomycin-polymixin-dexamethasone (MAXITROL) ophthalmic ointment     order for DME     pantoprazole (PROTONIX) 40 MG EC tablet     Current Facility-Administered Medications   Medication     bevacizumab (AVASTIN) intravitreal inj 1.25 mg         Lucian's family history includes Diabetes in his brother, sister, and sister.    ROS:   Patient denies any fevers, chills or sweats as well as any changes in or problems with vision, pain or problems with dentition, new or different headaches.  Patient denies symptoms of hypo and hyperglycemia except as above.   Patients denies marked fatigue, cough, shortness of breath, chest pain or pressure.  There has been no pain with or other changes in urination or  itching or pain in genital areas.  Patient denies any noted swelling in feet, ankles or otherwise, loss of sensation or pain  in  "feet or other areas.    Patient also denies current difficulties with depressed mood, anhedonia or worrying too much.  He is tired of COVID precautions, but feels god and optimistic.        Exam:      Wt Readings from Last 10 Encounters:   11/17/21 86.2 kg (190 lb)   10/19/21 90.4 kg (199 lb 4.8 oz)   09/28/21 87.5 kg (193 lb)   07/20/21 84.8 kg (187 lb)   07/19/21 86.2 kg (190 lb)   05/11/21 82.5 kg (181 lb 14.4 oz)   03/02/21 81.2 kg (179 lb)   01/05/21 82.4 kg (181 lb 9.6 oz)   12/21/20 82 kg (180 lb 12.4 oz)   12/09/20 80.7 kg (178 lb)     Video visit due to COVID precautions.      This is a warm and pleasant obese individual in no acute distress - with spouse in home.  Mood is \"good,\"  affect is appropriate,    Her eyes are clear without proptosis, with healthy appearing lens, conjunctiva and sclera.  EOMs intact.  Nares are patent.  Neck is supple without mass or JVD noted.    Respirations are easy and unlabored.  Skin is warm moist and elastic without lesions noted.         Data:      Recent Labs   Lab Test 12/08/21  0845 11/30/21  0859 11/17/21  1126 11/17/21  0848 10/15/21  0826 10/08/21  0850 09/28/21  1031 08/10/20  0845 08/05/20  0843 07/07/20  1732 07/07/20  0720 10/16/18  0904 10/03/18  0725 08/16/18  0834 08/06/18  0000 04/30/18  1148 04/30/18  0000   A1C  --   --   --  7.3* 7.8*  --   --    < >  --   --   --    < >  --   --   --   --   --    HEMOGLOBINA1  --   --   --   --   --   --   --   --   --   --   --   --   --   --  8.3*  --  10.6*   TSH  --   --   --   --   --   --   --   --  0.80  --  1.30   < >  --   --   --   --   --    LDL  --   --   --  28  --   --  58   < >  --   --   --    < >  --    < >  --   --   --    HDL  --   --   --  35*  --   --  37*   < >  --   --   --    < >  --    < >  --   --   --    TRIG  --   --   --  322*  --   --  278*   < >  --   --   --    < >  --    < >  --   --   --    CR 2.03* 2.31*  --  2.19* 1.98*   < > 2.06*   < > 1.70*   < >  --    < >  --    < >  --    < >  --  "   MICROL  --   --  146  --   --   --   --   --   --   --   --   --  6  --   --   --   --     < > = values in this interval not displayed.     GFR Estimate   Date Value Ref Range Status   12/08/2021 33 (L) >60 mL/min/1.73m2 Final     Comment:     As of July 11, 2021, eGFR is calculated by the CKD-EPI creatinine equation, without race adjustment. eGFR can be influenced by muscle mass, exercise, and diet. The reported eGFR is an estimation only and is only applicable if the renal function is stable.   11/30/2021 28 (L) >60 mL/min/1.73m2 Final     Comment:     As of July 11, 2021, eGFR is calculated by the CKD-EPI creatinine equation, without race adjustment. eGFR can be influenced by muscle mass, exercise, and diet. The reported eGFR is an estimation only and is only applicable if the renal function is stable.   11/17/2021 30 (L) >60 mL/min/1.73m2 Final     Comment:     As of July 11, 2021, eGFR is calculated by the CKD-EPI creatinine equation, without race adjustment. eGFR can be influenced by muscle mass, exercise, and diet. The reported eGFR is an estimation only and is only applicable if the renal function is stable.   10/15/2021 34 (L) >60 mL/min/1.73m2 Final     Comment:     As of July 11, 2021, eGFR is calculated by the CKD-EPI creatinine equation, without race adjustment. eGFR can be influenced by muscle mass, exercise, and diet. The reported eGFR is an estimation only and is only applicable if the renal function is stable.   10/08/2021 33 (L) >60 mL/min/1.73m2 Final     Comment:     As of July 11, 2021, eGFR is calculated by the CKD-EPI creatinine equation, without race adjustment. eGFR can be influenced by muscle mass, exercise, and diet. The reported eGFR is an estimation only and is only applicable if the renal function is stable.   05/11/2021 37 (L) >60 mL/min/[1.73_m2] Final     Comment:     Non  GFR Calc  Starting 12/18/2018, serum creatinine based estimated GFR (eGFR) will be    calculated using the Chronic Kidney Disease Epidemiology Collaboration   (CKD-EPI) equation.     03/02/2021 37 (L) >60 mL/min/[1.73_m2] Final     Comment:     Non  GFR Calc  Starting 12/18/2018, serum creatinine based estimated GFR (eGFR) will be   calculated using the Chronic Kidney Disease Epidemiology Collaboration   (CKD-EPI) equation.     01/14/2021 41 (L) >60 mL/min/[1.73_m2] Final     Comment:     Non  GFR Calc  Starting 12/18/2018, serum creatinine based estimated GFR (eGFR) will be   calculated using the Chronic Kidney Disease Epidemiology Collaboration   (CKD-EPI) equation.     01/05/2021 41 (L) >60 mL/min/[1.73_m2] Final     Comment:     Non  GFR Calc  Starting 12/18/2018, serum creatinine based estimated GFR (eGFR) will be   calculated using the Chronic Kidney Disease Epidemiology Collaboration   (CKD-EPI) equation.     12/09/2020 35 (L) >60 mL/min/[1.73_m2] Final     Comment:     Non  GFR Calc  Starting 12/18/2018, serum creatinine based estimated GFR (eGFR) will be   calculated using the Chronic Kidney Disease Epidemiology Collaboration   (CKD-EPI) equation.         Hemoglobin   Date Value Ref Range Status   11/17/2021 12.0 (L) 13.3 - 17.7 g/dL Final   05/11/2021 13.4 13.3 - 17.7 g/dL Final   ]        Assessment/Plan:    Lucian is a 65 year old male with  has a past medical history of CAD (coronary artery disease), CHF (congestive heart failure) (H), CKD (chronic kidney disease), stage III (H), Cortical cataract of both eyes, Diabetes (H), Hyperlipidemia, Hypertension, Infection due to Streptococcus mitis group (09/23/2020), Ischemic cardiomyopathy, Obesity, CHANTEL (obstructive sleep apnea), and Osteoarthritis.    He has no past medical history of Chronic infection, Complication of anesthesia, COPD (chronic obstructive pulmonary disease) (H), Heart murmur, Irregular heart beat, Liver disease, Other chronic pain, or Uncomplicated asthma.  He  is  s/p heart transplant on 7/19/2020 and left eye retinectomy on 12/21/20.    Lucian has type 2 diabetes complicated by ICM and heart transplant, CKD ( - GFR 28- 34 in last 6 m) , obesity, retinopathy that is reasonably controlled with A1C 7.3% last month though fictitiously depressed likely with Hgb.  Novolog corrections seem to be working and BG generally at goal with elevations largely related to diet.    Advised:  Continue  NPH to 40 qam, 12 qpm, Novolog 2:30 >140 with breakfast and lunch.  We may need to resume sliding scale insulin with dinner, but for now will trial increase in Trulicity to 1.5 mg and RTC 2-3 months.  They defer CDE at this time as wish to stay home for winter.        39 minutes in preparation for visit reviewing chart, labs and documentation, visiting with patient gathering history and in exam from both patient and spouse, education and counseling, as well as coordination of care, further chart review and documentation following visit on this date of service and as alluded to documented above.      Activity Duration    Chart accessed 9 minutes    Exam room 20 minutes    Current session 8 minutes    Total time: 39 minutes*         It is my privilege to be involved in the care of the above patient.     Marisabel Prieto PA-C, MPAS  [facility]  Diabetes, Endocrinology, and Metabolism  [phone] Appointments/Nurse  [phone] pager  [phone] nurse line    This note was completed in part using Dragon voice recognition, and may contain word and grammatical errors.    Video: 1:12 - 1:42                    Again, thank you for allowing me to participate in the care of your patient.      Sincerely,    Marisabel Prieto PA-C

## 2021-12-21 NOTE — LETTER
Date:December 22, 2021      Provider requested that no letter be sent. Do not send.       [facility]

## 2021-12-21 NOTE — PATIENT INSTRUCTIONS
Negron Anio!    [place].    Avisen si Trulicity 1.5 mike molestia y tenemos que otra vez bajar al dosis.  Sigue con dieta saldable y haciendo ejercisio diario.      My best wishes,    Marisabel Prieto PA-C, MPAS  [facility]  Diabetes, Endocrinology, and Metabolism  [phone] Appointments/Nurse  [phone] Fax  [phone] URGENTafter hours/weekend Endocrinologist on call

## 2021-12-29 ENCOUNTER — APPOINTMENT (OUTPATIENT)
Dept: INTERPRETER SERVICES | Facility: CLINIC | Age: 66
End: 2021-12-29
Payer: COMMERCIAL

## 2022-01-17 ENCOUNTER — APPOINTMENT (OUTPATIENT)
Dept: INTERPRETER SERVICES | Facility: CLINIC | Age: 67
End: 2022-01-17
Payer: COMMERCIAL

## 2022-01-18 DIAGNOSIS — E11.3513 TYPE 2 DIABETES MELLITUS WITH PROLIFERATIVE RETINOPATHY OF BOTH EYES AND MACULAR EDEMA, UNSPECIFIED WHETHER LONG TERM INSULIN USE (H): Primary | ICD-10-CM

## 2022-01-19 ENCOUNTER — OFFICE VISIT (OUTPATIENT)
Dept: OPHTHALMOLOGY | Facility: CLINIC | Age: 67
End: 2022-01-19
Attending: OPHTHALMOLOGY
Payer: COMMERCIAL

## 2022-01-19 DIAGNOSIS — Z94.1 HEART REPLACED BY TRANSPLANT (H): ICD-10-CM

## 2022-01-19 DIAGNOSIS — Z94.1 HEART TRANSPLANT, ORTHOTOPIC, STATUS (H): ICD-10-CM

## 2022-01-19 DIAGNOSIS — E11.3513 TYPE 2 DIABETES MELLITUS WITH PROLIFERATIVE RETINOPATHY OF BOTH EYES AND MACULAR EDEMA, UNSPECIFIED WHETHER LONG TERM INSULIN USE (H): ICD-10-CM

## 2022-01-19 PROCEDURE — 250N000011 HC RX IP 250 OP 636: Performed by: OPHTHALMOLOGY

## 2022-01-19 PROCEDURE — 67028 INJECTION EYE DRUG: CPT | Mod: RT | Performed by: OPHTHALMOLOGY

## 2022-01-19 PROCEDURE — G0463 HOSPITAL OUTPT CLINIC VISIT: HCPCS | Mod: 25

## 2022-01-19 PROCEDURE — 92235 FLUORESCEIN ANGRPH MLTIFRAME: CPT | Performed by: OPHTHALMOLOGY

## 2022-01-19 PROCEDURE — 92134 CPTRZ OPH DX IMG PST SGM RTA: CPT | Performed by: OPHTHALMOLOGY

## 2022-01-19 RX ADMIN — Medication 1.25 MG: at 10:28

## 2022-01-19 ASSESSMENT — VISUAL ACUITY
METHOD: SNELLEN - LINEAR
OD_SC+: +2
OS_SC: 20/500
OD_SC: 20/60

## 2022-01-19 ASSESSMENT — CONF VISUAL FIELD
OS_SUPERIOR_TEMPORAL_RESTRICTION: 2
OS_INFERIOR_NASAL_RESTRICTION: 2
OD_SUPERIOR_NASAL_RESTRICTION: 3
OS_SUPERIOR_NASAL_RESTRICTION: 2
OD_INFERIOR_TEMPORAL_RESTRICTION: 3
OD_SUPERIOR_TEMPORAL_RESTRICTION: 3
OS_INFERIOR_TEMPORAL_RESTRICTION: 2

## 2022-01-19 ASSESSMENT — TONOMETRY
OS_IOP_MMHG: 17
OD_IOP_MMHG: 17
IOP_METHOD: TONOPEN

## 2022-01-19 ASSESSMENT — SLIT LAMP EXAM - LIDS
COMMENTS: NORMAL
COMMENTS: NORMAL

## 2022-01-19 ASSESSMENT — EXTERNAL EXAM - LEFT EYE: OS_EXAM: NORMAL

## 2022-01-19 ASSESSMENT — REFRACTION_WEARINGRX
OS_ADD: +2.50
OD_ADD: +2.50

## 2022-01-19 ASSESSMENT — EXTERNAL EXAM - RIGHT EYE: OD_EXAM: NORMAL

## 2022-01-19 ASSESSMENT — CUP TO DISC RATIO: OD_RATIO: 0.25

## 2022-01-19 NOTE — PROGRESS NOTES
CC: Follow up proliferative diabetic retinopathy and slowly clearing vitreous hemorrhage right eye   Left eye status post PPV/MP/retinectomy 12/21/20. 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 Denies eye pain.    Interval Hx: Patient reports stable vision since last visit. Rare, small flash left eye. No new floaters or pain. Last A1c = 7.3 (11/17/21). Taking latanoprost at bedtime right eye and Pred forte every other day left eye.     Optical Coherence Tomography: 1/19/22  Right eye: Good foveal contour; trace small cystic changes and exudates, stable.   Left eye: Irregular retina; ERM; nasal to fovea with large bullous edema, temporal with subretinal fibrosis / trace edema, Atrophic thinned retina IT macula.     FA 1/19/22  right eye: PRP scars. Foci of NV superonasal.     Plan:   # Proliferative Diabetic Retinopathy, each eye   # Vitreous hemorrhage, right eye -  Resolving (onset 4/2021)   - multiple Avastin (last Avastin 11/3/2021)   - status post Panretinal laser photocoagulation (PRP) filling right eye: 7/21/2021; both eyes 6/12/19      # Funnel RD left eye   - progressed to funnel RD after loss to follow up given heart surgeries     - w retina folded on itself under SO   - guarded prognosis discussed   - Continue PF every other day left eye    # Ocuar HTN    - Continue Latanoprost hs, recommended both eyes     # Visually significant cataract right eye    - Obtain IOL calcs next visit   - Possible plan for CEIOL in the spring    Plan   Follow-up in 6-7 weeks for v/t/d oct mac, IOL calcs, and Panretinal laser photocoagulation (PRP) filling  Could consider avastin in approximately 10 weeks    Nicole Smith MD, PGY-3    ~~~~~~~~~~~~~~~~~~~~~~~~~~~~~~~~~~   Complete documentation of historical and exam elements from today's encounter can be found in the full encounter summary report (not reduplicated in this progress note).  I personally obtained the chief complaint(s) and history of present illness.   I confirmed and edited as necessary the review of systems, past medical/surgical history, family history, social history, and examination findings as documented by others; and I examined the patient myself.  I personally reviewed the relevant tests, images, and reports as documented above.  I personally reviewed the ophthalmic test(s) associated with this encounter, agree with the interpretation(s) as documented by the resident/fellow, and have edited the corresponding report(s) as necessary.   I formulated and edited as necessary the assessment and plan and discussed the findings and management plan with the patient and family and I was present for critical portions of the procedure carried out by the resident/fellow and immediately available for the entire procedure    Triny Bunch MD   of Ophthalmology.  Retina Service   Department of Ophthalmology and Visual Neurosciences   [facility]  Phone: [phone]   Fax: [phone]

## 2022-01-19 NOTE — NURSING NOTE
Chief Complaints and History of Present Illnesses   Patient presents with     Diabetic Retinopathy Follow Up     2 month follow up both eyes.     Chief Complaint(s) and History of Present Illness(es)     Diabetic Retinopathy Follow Up     Comments: 2 month follow up both eyes.              Comments     Pt here with  over the phone today.  Pt states vision is the same as last visit. No eye pain today.   No flashes or floaters.  DM2 BS: 127 this morning per pt.  Lab Results       Component                Value               Date                       A1C                      7.3                 11/17/2021                 A1C                      7.8                 10/15/2021                 A1C                      7.3                 07/19/2021                 A1C                      6.7                 01/14/2021                 A1C                      5.9                 12/07/2020                 A1C                      8.2                 07/03/2020                 A1C                      7.9                 07/08/2019                 A1C                      8.5                 03/11/2019              PABLITO Alberto January 19, 2022 8:41 AM

## 2022-01-20 RX ORDER — HYDRALAZINE HYDROCHLORIDE 100 MG/1
100 TABLET, FILM COATED ORAL EVERY 8 HOURS
Qty: 270 TABLET | Refills: 3 | Status: SHIPPED | OUTPATIENT
Start: 2022-01-20 | End: 2022-09-12 | Stop reason: ALTCHOICE

## 2022-01-24 RX ORDER — FUROSEMIDE 20 MG
20 TABLET ORAL DAILY
Qty: 90 TABLET | Refills: 3 | Status: SHIPPED | OUTPATIENT
Start: 2022-01-24 | End: 2023-07-17

## 2022-02-02 DIAGNOSIS — H40.051 BORDERLINE GLAUCOMA OF RIGHT EYE WITH OCULAR HYPERTENSION: ICD-10-CM

## 2022-02-03 RX ORDER — LATANOPROST 50 UG/ML
1 SOLUTION/ DROPS OPHTHALMIC AT BEDTIME
Qty: 15 ML | Refills: 11 | Status: SHIPPED | OUTPATIENT
Start: 2022-02-03 | End: 2023-03-09

## 2022-02-03 NOTE — TELEPHONE ENCOUNTER
latanoprost (XALATAN) 0.005 % ophthalmic solution  Last Written Prescription Date:  11/3/2021  Last Fill Quantity: 15,   # refills: 11  Last Office Visit :  1/19/2022  Future Office visit:   3/10/2022     Triny Bunch MD  Ophthalmology    Plan:   # Proliferative Diabetic Retinopathy, each eye   # Vitreous hemorrhage, right eye -  Resolving (onset 4/2021)              - multiple Avastin (last Avastin 11/3/2021)              - status post Panretinal laser photocoagulation (PRP) filling right eye: 7/21/2021; both eyes 6/12/19                 # Funnel RD left eye              - progressed to funnel RD after loss to follow up given heart surgeries                      - w retina folded on itself under SO              - guarded prognosis discussed              - Continue PF every other day left eye     # Ocuar HTN               - Continue Latanoprost hs, recommended both eyes      # Visually significant cataract right eye               - Obtain IOL calcs next visit              - Possible plan for CEIOL in the spring     Plan   Follow-up in 6-7 weeks for v/t/d oct mac, IOL calcs, and Panretinal laser photocoagulation (PRP) filling  Could consider avastin in approximately 10 weeks     Nicole Smith MD, PGY-3    15 mL, 11 Refills sent to pharm 2/3/2022      Claudia Edmondson RN  Central Triage Red Flags/Med Refills

## 2022-02-16 ENCOUNTER — OFFICE VISIT (OUTPATIENT)
Dept: DERMATOLOGY | Facility: CLINIC | Age: 67
End: 2022-02-16
Attending: NURSE PRACTITIONER
Payer: COMMERCIAL

## 2022-02-16 DIAGNOSIS — D18.01 CHERRY ANGIOMA: ICD-10-CM

## 2022-02-16 DIAGNOSIS — Z94.1 HEART REPLACED BY TRANSPLANT (H): ICD-10-CM

## 2022-02-16 DIAGNOSIS — L82.1 SEBORRHEIC KERATOSIS: ICD-10-CM

## 2022-02-16 DIAGNOSIS — D22.9 MULTIPLE BENIGN NEVI: ICD-10-CM

## 2022-02-16 DIAGNOSIS — L81.4 SOLAR LENTIGO: Primary | ICD-10-CM

## 2022-02-16 PROCEDURE — 99203 OFFICE O/P NEW LOW 30 MIN: CPT | Performed by: DERMATOLOGY

## 2022-02-16 PROCEDURE — T1013 SIGN LANG/ORAL INTERPRETER: HCPCS | Mod: GT

## 2022-02-16 ASSESSMENT — PAIN SCALES - GENERAL: PAINLEVEL: NO PAIN (0)

## 2022-02-16 NOTE — PROGRESS NOTES
[facility]  High Risk Non-Melanoma Skin Cancer Clinic Initial Consult Note  Encounter Date: Feb 16, 2022  Office Visit     Dermatology Problem List:  1. Hx heart transplant 2/2 ischemic cardiomyopathy 7/19/20  - sirolimus  ____________________________________________    Assessment & Plan:     # History of Heart solid organ transplantation in the setting of ischemic cardiomyopathy.   - We reviewed with the patient that due to the immunosuppressive medications used following transplant, solid organ transplant recipients are at a roughly 65-fold increased risk of squamous cell carcinoma, 20-fold increased risk of basal cell carcinoma, and 3.4 fold increased risk of melanoma compared to the general population.   - Based on the patient's risk factors and the Skin and Ultraviolet Neoplasia Transplant Risk Assessment Tool, the patient's risk of skin cancer following transplant is categorized as:  - Medium Risk: 5-Year Risk of 6.15% and 10-Year Risk of 13.73%  - We briefly reviewed prevention methods for reducing skin cancer after transplantation including avoidance of sun exposure, avoidance of indoor tanning beds or other indoor tanning devices, use of protective clothing (e.g. wide-brimmed hats, long sleeves, long pants), SPF 30 or greater sunscreen, and UV-protective sunglasses when outdoors.   - We discussed self skin examinations and partner-skin examinations.     # Hx cardiac transplant  - Patient is aware that they are at higher risk of skin cancer due to organ transplant status  - Recommended sun protection and regular skin exams    # Benign lesions: Multiple benign nevi, solar lentigos, seborrheic keratoses, cherry angiomas. Explained to patient benign nature of lesion. No treatment is necessary at this time unless the lesion changes or becomes symptomatic.   - ABCDs of melanoma were discussed and self skin checks were advised.  - Sun precaution was advised including the use of sun  screens of SPF 30 or higher, sun protective clothing, and avoidance of tanning beds.    Procedures Performed:   None    Follow-up: 1 year(s) in-person, or earlier for new or changing lesions    Staff and Scribe:     Scribe Disclosure:  I, Yobany Wilmar, am serving as a scribe to document services personally performed by Cristian Delacruz MD based on data collection and the provider's statements to me.     Provider Disclosure:   The documentation recorded by the scribe accurately reflects the services I personally performed and the decisions made by me.    Cristian Delacruz MD    Department of Dermatology  [facility]: Phone: [phone], Fax:[phone]  [facility] [place]: Phone: [phone] Fax: [phone]  ____________________________________________    CC: Skin Check (pt states is here for a full body skin check.)      HPI:  Mr. Lucian Oliveira is a(n) 66 year old male who presents today as a new patient for FBSE. He has a history of cardiac transplant on 7/19/20 in setting of ischemic cardiomyopathy.     Today, patient does not report spots of concern on his skin.    Patient is otherwise feeling well, without additional skin concerns.    Type of Organ Transplant: Heart  Reason for Transplant: Ischemic cardiomyopathy  Date of Transplant: 7/19/20  Immunosuppression Regimen: sirolimus    Personal History of Skin Cancer:  Every been diagnosed with the following:   IF YES, (if known) indicate type, body location, year diagnosed, and treatment.    - Pre-skin cancers (e.g. actinic keratoses): NO  - Atypical nevi: NO  - Keratinocyte carcinomas (basal cell carcinoma or squamous cell carcinoma): NO  - Melanoma: NO  - Other type(s) of skin cancer: NO     Family History of Skin Cancer:  Any first-degree relative relative(s) diagnosed with the following:  IF YES, (if known) indicate  relationship, type, and age at diagnosis.     - Keratinocyte carcinomas (basal cell carcinoma or squamous cell carcinoma): NO  - Melanoma: NO  - Other type of skin cancer: NO    Skin Cancer Review of Systems:  - Fair skin (Fink Type I or II): NO  - Blue, green or echo eyes: NO  - Light or red hair: NO  - Male sex assigned at birth: YES  - History of blistering sunburns: NO  - Use of indoor tanning devices or sunlamps: No   - If YES, indoor tanning device or sunlamp?   - If YES, approximate number of lifetime sessions:   - If YES, were these used before the age of 35?:  - Smoking History: No  - If YES, what is the pack-year smoking history?  - Ever lived in a sub-tropical region? Yes - Former  - Any history of arsenic exposure (e.g. well water)? No  - Any history of viral warts? Yes - Former  - Have you received the HPV vaccine? UNKNOWN  - Any occupational exposure to ultraviolet light exposure (e.g. construction work, agricultural work, ): Yes - Former   - If YES, what was the profession?   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   IF YES, indicate which medication (if necessary).     - TNF-alpha inhibitor (e.g. etanercept, adalimumab, infliximab): NO  - Tetracycline antibiotic (e.g. minocycline, doxycycline, tetracycline): NO  - Thiazide-containing anti-hypertensive (e.g. hydrochlorothiazide): NO  - Angiotensin receptor blocker (e.g. losartan): NO  - Sulfonylurea (e.g. glipizide): NO  - Amiodarone: NO  - Diltiazem: NO  - Voriconazole: NO     Labs Reviewed:  N/A    Physical Exam:  Vitals: There were no vitals taken for this visit.  SKIN: Full skin, which includes the head/face, both arms, chest, back, abdomen,both legs, genitalia and/or groin buttocks, digits and/or nails, was examined.  - There are dome shaped bright red papules on the trunk and extremities.   - Multiple regular brown pigmented  macules and papules are identified on the trunk and extremities.   - Scattered brown macules on sun exposed areas.  - There are waxy stuck on tan to brown papules on the trunk and extremities.   - No other lesions of concern on areas examined.     Medications:  Current Outpatient Medications   Medication     acetaminophen (TYLENOL) 325 MG tablet     Alcohol Swabs PADS     aspirin (ASA) 81 MG chewable tablet     atropine 1 % ophthalmic solution     blood glucose (NO BRAND SPECIFIED) test strip     blood glucose monitoring (ACCU-CHEK FELIPE SMARTVIEW) meter device kit     blood glucose monitoring (SOFTCLIX) lancets     calcium carbonate 600 mg-vitamin D 400 units (CALTRATE) 600-400 MG-UNIT per tablet     Calcium Carbonate-Vitamin D3 600-400 MG-UNIT TABS     Continuous Blood Gluc Sensor (FREESTYLE JEREMY 14 DAY SENSOR) MISC     Continuous Blood Gluc Sensor (FREESTYLE JEREMY 14 DAY SENSOR) MISC     dulaglutide (TRULICITY) 1.5 MG/0.5ML pen     furosemide (LASIX) 20 MG tablet     HUMALOG KWIKPEN 100 UNIT/ML soln     hydrALAZINE (APRESOLINE) 100 MG tablet     insulin aspart (NOVOLOG PEN) 100 UNIT/ML pen     insulin isophane human (HUMULIN N PEN) 100 UNIT/ML injection     insulin isophane human (HUMULIN N PEN) 100 UNIT/ML injection     insulin pen needle (32G X 4 MM) 32G X 4 MM miscellaneous     latanoprost (XALATAN) 0.005 % ophthalmic solution     magnesium oxide (MAG-OX) 400 (241.3 Mg) MG tablet     neomycin-polymixin-dexamethasone (MAXITROL) 0.1 % ophthalmic suspension     neomycin-polymixin-dexamethasone (MAXITROL) ophthalmic ointment     order for DME     pantoprazole (PROTONIX) 40 MG EC tablet     prednisoLONE acetate (PRED FORTE) 1 % ophthalmic suspension     prednisoLONE acetate (PRED FORTE) 1 % ophthalmic suspension     rosuvastatin (CRESTOR) 10 MG tablet     senna-docusate (SENOKOT-S/PERICOLACE) 8.6-50 MG tablet     sirolimus (GENERIC EQUIVALENT) 0.5 MG tablet     sulfamethoxazole-trimethoprim (BACTRIM) 400-80 MG  tablet     tacrolimus (GENERIC EQUIVALENT) 1 MG capsule     tamsulosin (FLOMAX) 0.4 MG capsule     Current Facility-Administered Medications   Medication     bevacizumab (AVASTIN) intravitreal inj 1.25 mg      Past Medical History:   Patient Active Problem List   Diagnosis     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 (H)     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)     Hyperlipidemia LDL goal <100     Hypertension goal BP (blood pressure) < 140/90     CHF (NYHA class II, ACC/AHA stage C) (H)     CKD (chronic kidney disease) stage 3, GFR 30-59 ml/min (H)     Health Care Home     Automatic implantable cardioverter-defibrillator - ClearDATA single lead ICD, Not Dependent     Chronic systolic heart failure (H)     Proteinuria     Vitamin D deficiency     OA (osteoarthritis) of knee     Chondromalacia of patella, unspecified laterality     Pain in joint of left shoulder     Tinnitus     Ptosis of right eyelid     Secondary renal hyperparathyroidism (H)     Cortical cataract of both eyes     Moderate nonproliferative diabetic retinopathy, without macular edema, associated with type 2 diabetes mellitus     Morbid obesity (H)     CHANTEL (obstructive sleep apnea)- severe (AHI 30)     Systolic heart failure (H)     Heart transplant candidate     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, unspecified whether long term insulin use (H)     Nuclear cataract, nonsenile     Coronary artery disease involving native coronary artery of native heart without angina pectoris     Status post coronary angiogram     CHF (congestive heart failure) (H)     Acute on chronic systolic congestive heart failure (H)     Heart failure (H)     Dental caries     Heart transplant, orthotopic, status (H)     Heart replaced by transplant (H)     Hyperglycemia     Sepsis (H)     Pseudomonas infection     Need for prophylactic  antibiotic     Trina-Barr virus viremia     Traction detachment of left retina     Immunodeficiency, unspecified (H)     CKD (chronic kidney disease) stage 4, GFR 15-29 ml/min (H)     HIT (heparin-induced thrombocytopenia) (H)     Cardiogenic shock (H)     Infection due to Streptococcus mitis group     Atrial flutter, unspecified type (H)     Past Medical History:   Diagnosis Date     CAD (coronary artery disease)      CHF (congestive heart failure) (H)      CKD (chronic kidney disease), stage III (H)      Cortical cataract of both eyes      Diabetes (H)      Hyperlipidemia      Hypertension      Infection due to Streptococcus mitis group 09/23/2020     Ischemic cardiomyopathy      Obesity      CHANTEL (obstructive sleep apnea)     occas cpap     CHANTEL (obstructive sleep apnea)- severe (AHI 30)      Osteoarthritis        CC Arlin Guajardo, NP  [address] [place] [address] on close of this encounter.

## 2022-02-16 NOTE — LETTER
2/16/2022       RE: Lucian Oliveira  [address] [facility] [address]     Dear Colleague,    Thank you for referring your patient, Lucian Oliveira, to the [facility] [place] at [facility]. Please see a copy of my visit note below.    [facility]  High Risk Non-Melanoma Skin Cancer Clinic Initial Consult Note  Encounter Date: Feb 16, 2022  Office Visit     Dermatology Problem List:  1. Hx heart transplant 2/2 ischemic cardiomyopathy 7/19/20  - sirolimus  ____________________________________________    Assessment & Plan:     # History of Heart solid organ transplantation in the setting of ischemic cardiomyopathy.   - We reviewed with the patient that due to the immunosuppressive medications used following transplant, solid organ transplant recipients are at a roughly 65-fold increased risk of squamous cell carcinoma, 20-fold increased risk of basal cell carcinoma, and 3.4 fold increased risk of melanoma compared to the general population.   - Based on the patient's risk factors and the Skin and Ultraviolet Neoplasia Transplant Risk Assessment Tool, the patient's risk of skin cancer following transplant is categorized as:  - Medium Risk: 5-Year Risk of 6.15% and 10-Year Risk of 13.73%  - We briefly reviewed prevention methods for reducing skin cancer after transplantation including avoidance of sun exposure, avoidance of indoor tanning beds or other indoor tanning devices, use of protective clothing (e.g. wide-brimmed hats, long sleeves, long pants), SPF 30 or greater sunscreen, and UV-protective sunglasses when outdoors.   - We discussed self skin examinations and partner-skin examinations.     # Hx cardiac transplant  - Patient is aware that they are at higher risk of skin cancer due to organ transplant status  - Recommended sun protection and regular skin exams    # Benign lesions:  Multiple benign nevi, solar lentigos, seborrheic keratoses, cherry angiomas. Explained to patient benign nature of lesion. No treatment is necessary at this time unless the lesion changes or becomes symptomatic.   - ABCDs of melanoma were discussed and self skin checks were advised.  - Sun precaution was advised including the use of sun screens of SPF 30 or higher, sun protective clothing, and avoidance of tanning beds.    Procedures Performed:   None    Follow-up: 1 year(s) in-person, or earlier for new or changing lesions    Staff and Scribe:     Scribe Disclosure:  I, Yobany Urban, am serving as a scribe to document services personally performed by Cristian Delacruz MD based on data collection and the provider's statements to me.     Provider Disclosure:   The documentation recorded by the scribe accurately reflects the services I personally performed and the decisions made by me.    Cristian Delacruz MD    Department of Dermatology  [facility]: Phone: [phone], Fax:[phone]  [facility]: Phone: [phone] Fax: [phone]  ____________________________________________    CC: Skin Check (pt states is here for a full body skin check.)      HPI:  Mr. Lucian Oliveira is a(n) 66 year old male who presents today as a new patient for FBSE. He has a history of cardiac transplant on 7/19/20 in setting of ischemic cardiomyopathy.     Today, patient does not report spots of concern on his skin.    Patient is otherwise feeling well, without additional skin concerns.    Type of Organ Transplant: Heart  Reason for Transplant: Ischemic cardiomyopathy  Date of Transplant: 7/19/20  Immunosuppression Regimen: sirolimus    Personal History of Skin Cancer:  Every been diagnosed with the following:   IF YES, (if known) indicate type, body location, year diagnosed, and treatment.    -  Pre-skin cancers (e.g. actinic keratoses): NO  - Atypical nevi: NO  - Keratinocyte carcinomas (basal cell carcinoma or squamous cell carcinoma): NO  - Melanoma: NO  - Other type(s) of skin cancer: NO     Family History of Skin Cancer:  Any first-degree relative relative(s) diagnosed with the following:  IF YES, (if known) indicate relationship, type, and age at diagnosis.     - Keratinocyte carcinomas (basal cell carcinoma or squamous cell carcinoma): NO  - Melanoma: NO  - Other type of skin cancer: NO    Skin Cancer Review of Systems:  - Fair skin (Fink Type I or II): NO  - Blue, green or echo eyes: NO  - Light or red hair: NO  - Male sex assigned at birth: YES  - History of blistering sunburns: NO  - Use of indoor tanning devices or sunlamps: No   - If YES, indoor tanning device or sunlamp?   - If YES, approximate number of lifetime sessions:   - If YES, were these used before the age of 35?:  - Smoking History: No  - If YES, what is the pack-year smoking history?  - Ever lived in a sub-tropical region? Yes - Former  - Any history of arsenic exposure (e.g. well water)? No  - Any history of viral warts? Yes - Former  - Have you received the HPV vaccine? UNKNOWN  - Any occupational exposure to ultraviolet light exposure (e.g. construction work, agricultural work, ): Yes - Former   - If YES, what was the profession?   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   IF YES, indicate which medication (if necessary).     - TNF-alpha inhibitor (e.g. etanercept, adalimumab, infliximab): NO  - Tetracycline antibiotic (e.g. minocycline, doxycycline, tetracycline): NO  - Thiazide-containing anti-hypertensive (e.g. hydrochlorothiazide): NO  - Angiotensin receptor blocker (e.g. losartan): NO  - Sulfonylurea (e.g. glipizide): NO  - Amiodarone: NO  - Diltiazem: NO  - Voriconazole: NO     Labs  Reviewed:  N/A    Physical Exam:  Vitals: There were no vitals taken for this visit.  SKIN: Full skin, which includes the head/face, both arms, chest, back, abdomen,both legs, genitalia and/or groin buttocks, digits and/or nails, was examined.  - There are dome shaped bright red papules on the trunk and extremities.   - Multiple regular brown pigmented macules and papules are identified on the trunk and extremities.   - Scattered brown macules on sun exposed areas.  - There are waxy stuck on tan to brown papules on the trunk and extremities.   - No other lesions of concern on areas examined.     Medications:  Current Outpatient Medications   Medication     acetaminophen (TYLENOL) 325 MG tablet     Alcohol Swabs PADS     aspirin (ASA) 81 MG chewable tablet     atropine 1 % ophthalmic solution     blood glucose (NO BRAND SPECIFIED) test strip     blood glucose monitoring (ACCU-CHEK FELIPE SMARTVIEW) meter device kit     blood glucose monitoring (SOFTCLIX) lancets     calcium carbonate 600 mg-vitamin D 400 units (CALTRATE) 600-400 MG-UNIT per tablet     Calcium Carbonate-Vitamin D3 600-400 MG-UNIT TABS     Continuous Blood Gluc Sensor (FREESTYLE JEREMY 14 DAY SENSOR) MISC     Continuous Blood Gluc Sensor (FREESTYLE JEREMY 14 DAY SENSOR) MISC     dulaglutide (TRULICITY) 1.5 MG/0.5ML pen     furosemide (LASIX) 20 MG tablet     HUMALOG KWIKPEN 100 UNIT/ML soln     hydrALAZINE (APRESOLINE) 100 MG tablet     insulin aspart (NOVOLOG PEN) 100 UNIT/ML pen     insulin isophane human (HUMULIN N PEN) 100 UNIT/ML injection     insulin isophane human (HUMULIN N PEN) 100 UNIT/ML injection     insulin pen needle (32G X 4 MM) 32G X 4 MM miscellaneous     latanoprost (XALATAN) 0.005 % ophthalmic solution     magnesium oxide (MAG-OX) 400 (241.3 Mg) MG tablet     neomycin-polymixin-dexamethasone (MAXITROL) 0.1 % ophthalmic suspension     neomycin-polymixin-dexamethasone (MAXITROL) ophthalmic ointment     order for DME     pantoprazole  (PROTONIX) 40 MG EC tablet     prednisoLONE acetate (PRED FORTE) 1 % ophthalmic suspension     prednisoLONE acetate (PRED FORTE) 1 % ophthalmic suspension     rosuvastatin (CRESTOR) 10 MG tablet     senna-docusate (SENOKOT-S/PERICOLACE) 8.6-50 MG tablet     sirolimus (GENERIC EQUIVALENT) 0.5 MG tablet     sulfamethoxazole-trimethoprim (BACTRIM) 400-80 MG tablet     tacrolimus (GENERIC EQUIVALENT) 1 MG capsule     tamsulosin (FLOMAX) 0.4 MG capsule     Current Facility-Administered Medications   Medication     bevacizumab (AVASTIN) intravitreal inj 1.25 mg      Past Medical History:   Patient Active Problem List   Diagnosis     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 (H)     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)     Hyperlipidemia LDL goal <100     Hypertension goal BP (blood pressure) < 140/90     CHF (NYHA class II, ACC/AHA stage C) (H)     CKD (chronic kidney disease) stage 3, GFR 30-59 ml/min (H)     Health Care Home     Automatic implantable cardioverter-defibrillator - Salorix single lead ICD, Not Dependent     Chronic systolic heart failure (H)     Proteinuria     Vitamin D deficiency     OA (osteoarthritis) of knee     Chondromalacia of patella, unspecified laterality     Pain in joint of left shoulder     Tinnitus     Ptosis of right eyelid     Secondary renal hyperparathyroidism (H)     Cortical cataract of both eyes     Moderate nonproliferative diabetic retinopathy, without macular edema, associated with type 2 diabetes mellitus     Morbid obesity (H)     CHANTEL (obstructive sleep apnea)- severe (AHI 30)     Systolic heart failure (H)     Heart transplant candidate     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, unspecified whether long term insulin use (H)     Nuclear cataract, nonsenile     Coronary artery disease involving native coronary artery of native heart without angina  pectoris     Status post coronary angiogram     CHF (congestive heart failure) (H)     Acute on chronic systolic congestive heart failure (H)     Heart failure (H)     Dental caries     Heart transplant, orthotopic, status (H)     Heart replaced by transplant (H)     Hyperglycemia     Sepsis (H)     Pseudomonas infection     Need for prophylactic antibiotic     Trina-Barr virus viremia     Traction detachment of left retina     Immunodeficiency, unspecified (H)     CKD (chronic kidney disease) stage 4, GFR 15-29 ml/min (H)     HIT (heparin-induced thrombocytopenia) (H)     Cardiogenic shock (H)     Infection due to Streptococcus mitis group     Atrial flutter, unspecified type (H)     Past Medical History:   Diagnosis Date     CAD (coronary artery disease)      CHF (congestive heart failure) (H)      CKD (chronic kidney disease), stage III (H)      Cortical cataract of both eyes      Diabetes (H)      Hyperlipidemia      Hypertension      Infection due to Streptococcus mitis group 09/23/2020     Ischemic cardiomyopathy      Obesity      CHANTEL (obstructive sleep apnea)     occas cpap     CHANTEL (obstructive sleep apnea)- severe (AHI 30)      Osteoarthritis        CC Arlin Guajardo, NP  [address] [place] [address] on close of this encounter.

## 2022-02-22 ENCOUNTER — TRANSFERRED RECORDS (OUTPATIENT)
Dept: HEALTH INFORMATION MANAGEMENT | Facility: CLINIC | Age: 67
End: 2022-02-22
Payer: COMMERCIAL

## 2022-02-22 DIAGNOSIS — Z94.1 HEART TRANSPLANT, ORTHOTOPIC, STATUS (H): ICD-10-CM

## 2022-02-23 RX ORDER — ROSUVASTATIN CALCIUM 10 MG/1
TABLET, COATED ORAL
Qty: 90 TABLET | Refills: 3 | Status: SHIPPED | OUTPATIENT
Start: 2022-02-23 | End: 2023-02-24

## 2022-03-01 ENCOUNTER — TRANSFERRED RECORDS (OUTPATIENT)
Dept: HEALTH INFORMATION MANAGEMENT | Facility: CLINIC | Age: 67
End: 2022-03-01
Payer: COMMERCIAL

## 2022-03-02 ENCOUNTER — TRANSFERRED RECORDS (OUTPATIENT)
Dept: HEALTH INFORMATION MANAGEMENT | Facility: CLINIC | Age: 67
End: 2022-03-02

## 2022-03-02 ENCOUNTER — OFFICE VISIT (OUTPATIENT)
Dept: FAMILY MEDICINE | Facility: CLINIC | Age: 67
End: 2022-03-02
Payer: COMMERCIAL

## 2022-03-02 VITALS
WEIGHT: 191.2 LBS | BODY MASS INDEX: 31.86 KG/M2 | TEMPERATURE: 98.4 F | HEART RATE: 98 BPM | HEIGHT: 65 IN | OXYGEN SATURATION: 99 % | SYSTOLIC BLOOD PRESSURE: 137 MMHG | DIASTOLIC BLOOD PRESSURE: 76 MMHG

## 2022-03-02 DIAGNOSIS — N18.4 CKD (CHRONIC KIDNEY DISEASE) STAGE 4, GFR 15-29 ML/MIN (H): ICD-10-CM

## 2022-03-02 DIAGNOSIS — Z79.4 TYPE 2 DIABETES MELLITUS WITH DIABETIC NEPHROPATHY, WITH LONG-TERM CURRENT USE OF INSULIN (H): Chronic | ICD-10-CM

## 2022-03-02 DIAGNOSIS — Z00.00 ENCOUNTER FOR MEDICARE ANNUAL WELLNESS EXAM: Primary | ICD-10-CM

## 2022-03-02 DIAGNOSIS — I50.22 CHRONIC SYSTOLIC HEART FAILURE (H): ICD-10-CM

## 2022-03-02 DIAGNOSIS — Z23 NEED FOR PNEUMOCOCCAL VACCINATION: ICD-10-CM

## 2022-03-02 DIAGNOSIS — Z94.1 HEART REPLACED BY TRANSPLANT (H): ICD-10-CM

## 2022-03-02 DIAGNOSIS — I82.A11 ACUTE EMBOLISM AND THROMBOSIS OF RIGHT AXILLARY VEIN (H): ICD-10-CM

## 2022-03-02 DIAGNOSIS — D84.81 IMMUNODEFICIENCY DUE TO CONDITIONS CLASSIFIED ELSEWHERE (H): ICD-10-CM

## 2022-03-02 DIAGNOSIS — I25.739 CORONARY ARTERY DISEASE INVOLVING NONAUTOLOGOUS BIOLOGICAL CORONARY BYPASS GRAFT WITH ANGINA PECTORIS (H): ICD-10-CM

## 2022-03-02 DIAGNOSIS — D75.829 HEPARIN INDUCED THROMBOCYTOPENIA (HIT) (H): ICD-10-CM

## 2022-03-02 DIAGNOSIS — I48.92 ATRIAL FLUTTER, UNSPECIFIED TYPE (H): ICD-10-CM

## 2022-03-02 DIAGNOSIS — E11.21 TYPE 2 DIABETES MELLITUS WITH DIABETIC NEPHROPATHY, WITH LONG-TERM CURRENT USE OF INSULIN (H): Chronic | ICD-10-CM

## 2022-03-02 DIAGNOSIS — N25.81 SECONDARY RENAL HYPERPARATHYROIDISM (H): ICD-10-CM

## 2022-03-02 DIAGNOSIS — Z23 HIGH PRIORITY FOR 2019-NCOV VACCINE: ICD-10-CM

## 2022-03-02 PROBLEM — Z76.82 HEART TRANSPLANT CANDIDATE: Status: RESOLVED | Noted: 2019-07-18 | Resolved: 2022-03-02

## 2022-03-02 PROBLEM — R57.0 CARDIOGENIC SHOCK (H): Status: RESOLVED | Noted: 2021-02-04 | Resolved: 2022-03-02

## 2022-03-02 PROBLEM — R73.9 HYPERGLYCEMIA: Status: RESOLVED | Noted: 2020-08-24 | Resolved: 2022-03-02

## 2022-03-02 PROBLEM — A49.8 PSEUDOMONAS INFECTION: Status: RESOLVED | Noted: 2020-10-05 | Resolved: 2022-03-02

## 2022-03-02 PROBLEM — I50.9 HEART FAILURE (H): Status: RESOLVED | Noted: 2020-07-03 | Resolved: 2022-03-02

## 2022-03-02 PROBLEM — A49.1 INFECTION DUE TO STREPTOCOCCUS MITIS GROUP: Status: RESOLVED | Noted: 2020-09-23 | Resolved: 2022-03-02

## 2022-03-02 PROBLEM — B27.00 EPSTEIN-BARR VIRUS VIREMIA: Status: RESOLVED | Noted: 2020-10-05 | Resolved: 2022-03-02

## 2022-03-02 PROBLEM — I50.20 SYSTOLIC HEART FAILURE (H): Status: RESOLVED | Noted: 2019-05-14 | Resolved: 2022-03-02

## 2022-03-02 PROBLEM — I50.23 ACUTE ON CHRONIC SYSTOLIC CONGESTIVE HEART FAILURE (H): Status: RESOLVED | Noted: 2020-07-03 | Resolved: 2022-03-02

## 2022-03-02 PROBLEM — I50.9 CHF (CONGESTIVE HEART FAILURE) (H): Status: RESOLVED | Noted: 2020-07-03 | Resolved: 2022-03-02

## 2022-03-02 PROCEDURE — 99397 PER PM REEVAL EST PAT 65+ YR: CPT | Mod: 25 | Performed by: FAMILY MEDICINE

## 2022-03-02 PROCEDURE — G0009 ADMIN PNEUMOCOCCAL VACCINE: HCPCS | Performed by: FAMILY MEDICINE

## 2022-03-02 PROCEDURE — 91306 COVID-19,PF,MODERNA (18+ YRS BOOSTER .25ML): CPT | Performed by: FAMILY MEDICINE

## 2022-03-02 PROCEDURE — 90732 PPSV23 VACC 2 YRS+ SUBQ/IM: CPT | Performed by: FAMILY MEDICINE

## 2022-03-02 PROCEDURE — 0064A COVID-19,PF,MODERNA (18+ YRS BOOSTER .25ML): CPT | Performed by: FAMILY MEDICINE

## 2022-03-02 ASSESSMENT — ACTIVITIES OF DAILY LIVING (ADL): CURRENT_FUNCTION: NO ASSISTANCE NEEDED

## 2022-03-02 NOTE — PATIENT INSTRUCTIONS
Patient Education   Personalized Prevention Plan  You are due for the preventive services outlined below.  Your care team is available to assist you in scheduling these services.  If you have already completed any of these items, please share that information with your care team to update in your medical record.  Health Maintenance Due   Topic Date Due     ANNUAL REVIEW OF HM ORDERS  Never done     Zoster (Shingles) Vaccine (1 of 2) Never done     Pneumococcal Vaccine (3 of 4 - PCV13) 04/03/2016     Heart Failure Action Plan  08/12/2018     Diptheria Tetanus Pertussis (DTAP/TDAP/TD) Vaccine (2 - Td or Tdap) 08/04/2019     Diabetic Foot Exam  08/16/2019     COVID-19 Vaccine (3 - Moderna risk 4-dose series) 10/07/2021     Kidney Microalbumin Urine Test  02/17/2022     Hemoglobin  05/17/2022

## 2022-03-02 NOTE — PROGRESS NOTES
"SUBJECTIVE:   Lucian Oliveira is a 66 year old male who presents for a Preventive Visit along with his wife.  He is a new patient to me and our clinic.  He has an extensive past medical history including a heart transplant history, but apparently does not have a PCP.  He is followed by cardiology and the transplant team for his heart disease.  He has type 2 diabetes and is followed by endocrinology for that.  He gets regular routine lab work done.  Review of his chart shows that he is behind on some routine immunizations.      Patient has been advised of split billing requirements and indicates understanding: Yes  Are you in the first 12 months of your Medicare coverage?      Healthy Habits:    In general, how would you rate your overall health?  Very good    Frequency of exercise:  6-7 days/week    Duration of exercise:  45-60 minutes    Do you usually eat at least 4 servings of fruit and vegetables a day, include whole grains    & fiber and avoid regularly eating high fat or \"junk\" foods?  Yes    Taking medications regularly:  Yes    Barriers to taking medications:  None    Medication side effects:  None    Ability to successfully perform activities of daily living:  No assistance needed    Home Safety:  No safety concerns identified    Hearing Impairment:  No hearing concerns    In the past 6 months, have you been bothered by leaking of urine?  No    In general, how would you rate your overall mental or emotional health?  Very good      PHQ-2 Total Score:    Additional concerns today:  No    Do you feel safe in your environment? Yes    Have you ever done Advance Care Planning? (For example, a Health Directive, POLST, or a discussion with a medical provider or your loved ones about your wishes): No, advance care planning information given to patient to review.  Patient plans to discuss their wishes with loved ones or provider.         Fall risk  Fallen 2 or more times in the past year?: No  Any fall with " injury in the past year?: No    Cognitive Screening   1) Repeat 3 items (Leader, Season, Table)    2) Clock draw: NORMAL  3) 3 item recall: Recalls 2 objects   Results: NORMAL clock, 1-2 items recalled: COGNITIVE IMPAIRMENT LESS LIKELY    Mini-CogTM Copyright JENNY Lu. Licensed by the author for use in [facility]; reprinted with permission ([email or web address]). All rights reserved.      Do you have sleep apnea, excessive snoring or daytime drowsiness?: no    Reviewed and updated as needed this visit by clinical staff   Tobacco  Allergies  Meds   Med Hx  Surg Hx  Fam Hx  Soc Hx        Reviewed and updated as needed this visit by Provider                 Social History     Tobacco Use     Smoking status: Never Smoker     Smokeless tobacco: Never Used     Tobacco comment: Never smoked; non-smoking household   Substance Use Topics     Alcohol use: No     Alcohol/week: 0.0 standard drinks     If you drink alcohol do you typically have >3 drinks per day or >7 drinks per week? No    Alcohol Use 9/30/2014   Prescreen: >3 drinks/day or >7 drinks/week? The patient does not drink >3 drinks per day nor >7 drinks per week.       Current providers sharing in care for this patient include:   Patient Care Team:  No Ref-Primary, Physician as PCP - Diane Groves APRN CNP as Nurse Practitioner (Cardiology)  Arvin Sheridan MD as MD (Cardiology)  Yue Dowd MD as MD (INTERNAL MEDICINE - ENDOCRINOLOGY, DIABETES & METABOLISM)  Christelle Pettit, RN as Transplant Coordinator (Cardiology)  Negrito Rai [facility] as Pharmacist (Pharmacist)  Care, [facility] (HOME HEALTH AGENCY (HH), ([facility]))  Triny Bunch MD as Assigned Surgical Provider  Marisabel Prieto PA-C as Assigned Endocrinology Provider  Edita Espitia MD as Assigned PCP  Kvng Carrillo MD as MD (Dermatology)  Arlin Guajardo NP as Referring Physician (Interventional Cardiology)  Arlin Guajardo NP as  Assigned Heart and Vascular Provider  Triny Bunch MD as MD (Ophthalmology)    The following health maintenance items are reviewed in Epic and correct as of today:  Health Maintenance Due   Topic Date Due     ANNUAL REVIEW OF  ORDERS  Never done     ZOSTER IMMUNIZATION (1 of 2) Never done     Pneumococcal Vaccine: 65+ Years (3 of 4 - PCV13) 04/03/2016     HF ACTION PLAN  08/12/2018     DTAP/TDAP/TD IMMUNIZATION (2 - Td or Tdap) 08/04/2019     MEDICARE ANNUAL WELLNESS VISIT  08/16/2019     DIABETIC FOOT EXAM  08/16/2019     COVID-19 Vaccine (3 - Moderna risk 4-dose series) 10/07/2021     MICROALBUMIN  02/17/2022     HEMOGLOBIN  05/17/2022     Patient Active Problem List   Diagnosis     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 (H)     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)     Hyperlipidemia LDL goal <100     Hypertension goal BP (blood pressure) < 140/90     CHF (NYHA class II, ACC/AHA stage C) (H)     CKD (chronic kidney disease) stage 3, GFR 30-59 ml/min (H)     Health Care Home     Automatic implantable cardioverter-defibrillator - [employer] single lead ICD, Not Dependent     Chronic systolic heart failure (H)     Proteinuria     Vitamin D deficiency     OA (osteoarthritis) of knee     Chondromalacia of patella, unspecified laterality     Pain in joint of left shoulder     Tinnitus     Ptosis of right eyelid     Secondary renal hyperparathyroidism (H)     Cortical cataract of both eyes     Moderate nonproliferative diabetic retinopathy, without macular edema, associated with type 2 diabetes mellitus     CHANTEL (obstructive sleep apnea)- severe (AHI 30)     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, unspecified whether long term insulin use (H)     Nuclear cataract, nonsenile     Coronary artery disease involving native coronary artery of native heart without angina pectoris      Status post coronary angiogram     Dental caries     Heart transplant, orthotopic, status (H)     Heart replaced by transplant (H)     Sepsis (H)     Need for prophylactic antibiotic     Traction detachment of left retina     Immunodeficiency, unspecified (H)     CKD (chronic kidney disease) stage 4, GFR 15-29 ml/min (H)     HIT (heparin-induced thrombocytopenia) (H)     Atrial flutter, unspecified type (H)     Acute embolism and thrombosis of right axillary vein (H)     Past Surgical History:   Procedure Laterality Date     CATARACT IOL, RT/LT       COLONOSCOPY N/A 8/7/2019    Procedure: COLONOSCOPY, WITH POLYPECTOMY AND BIOPSY;  Surgeon: Chauncey Morataya MD;  [facility]     CV ANGIOGRAM CORONARY GRAFT N/A 7/2/2020    Procedure: Angiogram Coronary Graft;  Surgeon: Alex Lantigua MD;  Location:  HEART CARDIAC CATH LAB     CV CORONARY ANGIOGRAM N/A 7/2/2020    Procedure: CV CORONARY ANGIOGRAM;  Surgeon: Alex Lantigua MD;  [facility]     CV CORONARY ANGIOGRAM N/A 7/20/2021    Procedure: CV CORONARY ANGIOGRAM;  Surgeon: Raimundo Hudson MD;  [facility]: [facility]     CV HEART BIOPSY N/A 7/27/2020    Procedure: Heart Biopsy;  Surgeon: Mario Burr MD;  [facility]     CV HEART BIOPSY N/A 8/3/2020    Procedure: CV HEART BIOPSY;  Surgeon: Alex Lantigua MD;  [facility]: [facility]     CV HEART BIOPSY N/A 8/10/2020    Procedure: CV HEART BIOPSY;  Surgeon: Mario Burr MD;  [facility]: [facility]     CV HEART BIOPSY N/A 8/17/2020    Procedure: CV HEART BIOPSY;  Surgeon: Raimundo Hudson MD;  [facility]: [facility]     CV HEART BIOPSY N/A 8/31/2020    Procedure: CV HEART BIOPSY;  Surgeon: Moises Santos MD;  Location:  HEART CARDIAC CATH LAB     CV HEART BIOPSY N/A 9/14/2020    Procedure: CV HEART BIOPSY;  Surgeon: Raimundo Hudson  MD Dayron;  Location: [facility]     CV HEART BIOPSY N/A 9/28/2020    Procedure: CV HEART BIOPSY;  Surgeon: Raimundo Hudson MD;  [facility]     CV HEART BIOPSY N/A 10/12/2020    Procedure: CV HEART BIOPSY;  Surgeon: John Stuart MD;  [facility]     CV HEART BIOPSY N/A 11/10/2020    Procedure: CV HEART BIOPSY;  Surgeon: John Stuart MD;  [facility]     CV HEART BIOPSY N/A 12/7/2020    Procedure: CV HEART BIOPSY;  Surgeon: Raimundo Hudson MD;  Location:  HEART CARDIAC CATH LAB     CV HEART BIOPSY N/A 1/5/2021    Procedure: CV HEART BIOPSY;  Surgeon: Raimundo Hudson MD;  Location:  HEART CARDIAC CATH LAB     CV HEART BIOPSY N/A 7/20/2021    Procedure: CV HEART BIOPSY;  Surgeon: Raimundo Hudson MD;  [facility]     CV HEART BIOPSY N/A 9/28/2021    Procedure: CV HEART BIOPSY;  Surgeon: Raimundo Hudson MD;  Location:  HEART CARDIAC CATH LAB     CV INTRA AORTIC BALLOON N/A 7/14/2020    Procedure: RHC WITH LEAVE IN SWAN/IABP;  Surgeon: Sonny Saleh MD;  Location:  HEART CARDIAC CATH LAB     CV RIGHT HEART CATH MEASUREMENTS RECORDED N/A 3/25/2019    Procedure: CV RIGHT HEART CATH;  Surgeon: Moises Santos MD;  Location:  HEART CARDIAC CATH LAB     CV RIGHT HEART CATH MEASUREMENTS RECORDED N/A 7/10/2019    Procedure: CV RIGHT HEART CATH;  Surgeon: Jak Mccabe MD;  Location:  HEART CARDIAC CATH LAB     CV RIGHT HEART CATH MEASUREMENTS RECORDED N/A 7/8/2020    Procedure: Right Heart Cath with Leave In swan already has ICU load;  Surgeon: Jak Mccabe MD;  Location:  HEART CARDIAC CATH LAB     CV RIGHT HEART CATH MEASUREMENTS RECORDED N/A 7/14/2020    Procedure: CV RIGHT HEART CATH;  Surgeon: Sonny Saleh MD;  Location:  HEART CARDIAC CATH LAB     CV RIGHT HEART CATH MEASUREMENTS  RECORDED N/A 7/2/2020    Procedure: CV RIGHT HEART CATH;  Surgeon: Alex Lantigua MD;  [facility]     CV RIGHT HEART CATH MEASUREMENTS RECORDED N/A 7/27/2020    Procedure: Right Heart Cath;  Surgeon: Mario Burr MD;  [facility]: [facility]     CV RIGHT HEART CATH MEASUREMENTS RECORDED N/A 8/3/2020    Procedure: Right Heart Cath;  Surgeon: Alex Lantigua MD;  [facility]: [facility]     CV RIGHT HEART CATH MEASUREMENTS RECORDED N/A 8/10/2020    Procedure: CV RIGHT HEART CATH;  Surgeon: Mario Burr MD;  Location:  HEART CARDIAC CATH LAB     CV RIGHT HEART CATH MEASUREMENTS RECORDED N/A 8/17/2020    Procedure: CV RIGHT HEART CATH;  Surgeon: Raimundo Hudson MD;  Location:  HEART CARDIAC CATH LAB     CV RIGHT HEART CATH MEASUREMENTS RECORDED N/A 8/31/2020    Procedure: CV RIGHT HEART CATH;  Surgeon: Moises Santos MD;  Location:  HEART CARDIAC CATH LAB     CV RIGHT HEART CATH MEASUREMENTS RECORDED N/A 9/14/2020    Procedure: CV RIGHT HEART CATH;  Surgeon: Raimundo Hudson MD;  Location:  HEART CARDIAC CATH LAB     CV RIGHT HEART CATH MEASUREMENTS RECORDED N/A 9/28/2020    Procedure: CV RIGHT HEART CATH;  Surgeon: Raimundo Hudson MD;  Location:  HEART CARDIAC CATH LAB     CV RIGHT HEART CATH MEASUREMENTS RECORDED N/A 10/12/2020    Procedure: CV RIGHT HEART CATH;  Surgeon: John Stuart MD;  [facility]: [facility]     CV RIGHT HEART CATH MEASUREMENTS RECORDED N/A 11/10/2020    Procedure: CV RIGHT HEART CATH;  Surgeon: John Stuart MD;  [facility]     CV RIGHT HEART CATH MEASUREMENTS RECORDED N/A 12/7/2020    Procedure: CV RIGHT HEART CATH;  Surgeon: Raimundo Hudson MD;  Location:  HEART CARDIAC CATH LAB     CV RIGHT HEART CATH MEASUREMENTS RECORDED N/A 1/5/2021    Procedure: CV RIGHT HEART CATH;  Surgeon: Raimundo Hudson  MD Dayron;  Location:  HEART CARDIAC CATH LAB     CV RIGHT HEART CATH MEASUREMENTS RECORDED N/A 7/20/2021    Procedure: CV RIGHT HEART CATH;  Surgeon: Raimundo Hudson MD;  Location:  HEART CARDIAC CATH LAB     CV RIGHT HEART CATH MEASUREMENTS RECORDED N/A 9/28/2021    Procedure: CV RIGHT HEART CATH;  Surgeon: Raimundo Hudson MD;  Location:  HEART CARDIAC CATH LAB     EXTRACTION(S) DENTAL Left 7/13/2020    Procedure: EXTRACTION, TOOTH #11, 12, 13, 15, and 29;  Surgeon: Monica Chao DDS;  [facility]     IMPLANT AUTOMATIC IMPLANTABLE CARDIOVERTER DEFIBRILLATOR       INCISION AND DRAINAGE STERNUM W/ IRRIGATION SYSTEM, COMBINED N/A 9/23/2020    Procedure: INCISION AND DRAINAGE, LEFT SUPRACLAVICULAR WOUND INFECTION AND ABDOMENN WOUND.;  Surgeon: Ran Huertas MD;  [facility]     INSERT INTRAAORTIC BALLOON PUMP N/A 7/16/2020    Procedure: Subclavian Intra-Aortic Balloon Pump Placement;  Surgeon: Allan Sparrow MD;  [facility]     IRRIGATION AND DEBRIDEMENT CHEST WASHOUT, COMBINED N/A 9/28/2020    Procedure: IRRIGATION AND DEBRIDEMENT OF LEFT UPPER CHEST WOUND.;  Surgeon: Ran Huertas MD;  [facility]     PHACOEMULSIFICATION CLEAR CORNEA WITH STANDARD IOL, VITRECTOMY PARSPLANA 25 GAGUE, COMBINED Left 12/10/2019    Procedure: PHACOEMULSIFICATION, CATARACT, CLEAR CORNEAL INCISION APPROACH, W STD INTRAOCULAR LENS IMPLANT INSERT + VITRECTOMY BY PARS PLANA  USING 25-GAUGE INSTRUMENTS. ENDOLASER, INFUSION OF 20% SF6 GAS;  Surgeon: Triny Bunch MD;  [facility]     PICC DOUBLE LUMEN PLACEMENT Left 07/28/2020    5Fr - 42cm, Basilic vein, mid SVC     PICC INSERTION Right 07/11/2020    basilic 44 cm total      TRANSPLANT HEART RECIPIENT N/A 7/19/2020    Procedure: REDO MEDIAN STERNOTOMY, TRANSPLANT, ORTHOTOPIC HEART, RECIPIENT, ON PUMP OXYGENATOR, REMOVAL OF CARDIAC DEFIBRILLATOR AND LEAD;  Surgeon: Griselli, Massimo, MD;  [facility]      VITRECTOMY, PARS PLANA APPROACH, USING 27-GAUGE INSTRUMENTS Left 12/21/2020    Procedure: 27 gauge pars plana vitectomy, membrane peel, perfluoroctane liquid (PFO), retinectomy, endolaser, Silicone Oil 1000 cs;  Surgeon: Triny Bunch MD;  Location:  OR     [facility] CABG, ARTERY-VEIN, THREE  02/2008       Social History     Tobacco Use     Smoking status: Never Smoker     Smokeless tobacco: Never Used     Tobacco comment: Never smoked; non-smoking household   Substance Use Topics     Alcohol use: No     Alcohol/week: 0.0 standard drinks     Family History   Problem Relation Age of Onset     Diabetes Brother      Diabetes Sister      Diabetes Sister      Macular Degeneration No family hx of      Glaucoma No family hx of      Myocardial Infarction No family hx of      Kidney Disease No family hx of          Current Outpatient Medications   Medication Sig Dispense Refill     Alcohol Swabs PADS Use to swab the area of the injection or sergio as directed   Per insurance coverage 100 each 0     aspirin (ASA) 81 MG chewable tablet Take 1 tablet (81 mg) by mouth daily 120 tablet 0     atropine 1 % ophthalmic solution Place 1 drop Into the left eye daily       blood glucose (NO BRAND SPECIFIED) test strip Use to test blood sugar 4 times daily or as directed. 400 strip 3     blood glucose monitoring (ACCU-CHEK FELIPE SMARTVIEW) meter device kit Use to test blood sugar 3-4 times daily, as directed. 1 kit 0     blood glucose monitoring (SOFTCLIX) lancets 1 each 4 times daily Use to test blood sugars 2 times daily. 400 each 11     Calcium Carbonate-Vitamin D3 600-400 MG-UNIT TABS TAKE ONE TABLET BY MOUTH TWICE A DAY WITH MEALS 180 tablet 3     dulaglutide (TRULICITY) 1.5 MG/0.5ML pen Inject 1.5 mg Subcutaneous every 7 days 2 mL 4     furosemide (LASIX) 20 MG tablet Take 1 tablet (20 mg) by mouth daily 90 tablet 3     HUMALOG KWIKPEN 100 UNIT/ML soln Inject 0-31 Units Subcutaneous 3 times daily (with meals) + Custom MEAL and  SNACK corrective dosing   Approx 90 units daily max 90 mL 3     hydrALAZINE (APRESOLINE) 100 MG tablet Take 1 tablet (100 mg) by mouth every 8 hours 270 tablet 3     insulin aspart (NOVOLOG PEN) 100 UNIT/ML pen Inject 0-31 Units Subcutaneous 3 times daily (with meals) Custom MEAL and SNACK corrective dosing     BG less than 80, do not give Novolog.  BG , give  15 units.  -169, give  17 units.  -199 give 19 units.  -229 give 21 units.  -259 give 23 units.  -289 give 25 units.  -319 give 27 units.  -349 give 29 units.  BG >/= 350 give 31 units.  To be given with meal based on pre-meal blood glucose 6 mL 0     insulin isophane human (HUMULIN N PEN) 100 UNIT/ML injection Inject 35 Units Subcutaneous every morning (Patient taking differently: Inject 40 Units Subcutaneous every morning ) 30 mL 11     insulin isophane human (HUMULIN N PEN) 100 UNIT/ML injection Inject 8 Units Subcutaneous At Bedtime (Patient taking differently: Inject 12 Units Subcutaneous At Bedtime ) 3 mL 1     insulin pen needle (32G X 4 MM) 32G X 4 MM miscellaneous Use 5-6 pen needles daily or as directed. 450 each 3     latanoprost (XALATAN) 0.005 % ophthalmic solution Place 1 drop into both eyes At Bedtime 15 mL 11     magnesium oxide (MAG-OX) 400 (241.3 Mg) MG tablet TAKE ONE TABLET BY MOUTH TWICE A  tablet 3     prednisoLONE acetate (PRED FORTE) 1 % ophthalmic suspension Place 1-2 drops Into the left eye 2 times daily 1 Bottle 1     rosuvastatin (CRESTOR) 10 MG tablet TAKE ONE TABLET BY MOUTH ONCE DAILY 90 tablet 3     senna-docusate (SENOKOT-S/PERICOLACE) 8.6-50 MG tablet Take 1 tablet by mouth 2 times daily as needed (hold for loose stools) 60 tablet 3     sirolimus (GENERIC EQUIVALENT) 0.5 MG tablet Take 4 tablets (2 mg) by mouth daily 120 tablet 11     sulfamethoxazole-trimethoprim (BACTRIM) 400-80 MG tablet Take 1 tablet by mouth three times a week 36 tablet 3     tacrolimus (GENERIC  EQUIVALENT) 1 MG capsule Take 3 capsules (3 mg) by mouth every morning AND 3 capsules (3 mg) every evening. 180 capsule 11     tamsulosin (FLOMAX) 0.4 MG capsule Take 1 capsule (0.4 mg) by mouth daily 90 capsule 3     acetaminophen (TYLENOL) 325 MG tablet Take 3 tablets (975 mg) by mouth every 8 hours as needed for mild pain (Patient not taking: Reported on 3/2/2022) 60 tablet 3     calcium carbonate 600 mg-vitamin D 400 units (CALTRATE) 600-400 MG-UNIT per tablet Take 1 tablet by mouth 2 times daily (with meals) (Patient not taking: Reported on 3/2/2022) 180 tablet 3     Continuous Blood Gluc Sensor (FREESTYLE JEREMY 14 DAY SENSOR) MISC Change every 14 days. (Patient not taking: Reported on 3/2/2022) 2 each 11     Continuous Blood Gluc Sensor (FREESTYLE JEREMY 14 DAY SENSOR) MISC Change every 14 days. (Patient not taking: Reported on 3/2/2022) 6 each 4     neomycin-polymixin-dexamethasone (MAXITROL) 0.1 % ophthalmic suspension Apply 1 drop to eye 4 times daily Instill into operative eye(s) per physician instructions. (Patient not taking: Reported on 3/2/2022) 5 mL 0     neomycin-polymixin-dexamethasone (MAXITROL) ophthalmic ointment Place 1 Application Into the left eye At Bedtime  (Patient not taking: Reported on 3/2/2022)       order for DME Auto CPAP 8-15 cmH20 1 Units 0     pantoprazole (PROTONIX) 40 MG EC tablet  (Patient not taking: Reported on 3/2/2022)       prednisoLONE acetate (PRED FORTE) 1 % ophthalmic suspension Place 1-2 drops Into the left eye every other day 15 mL 1     Allergies   Allergen Reactions     Heparin Heparin Induced Thrombocytopenia     HIT screen sent 7/18/2020. CORRINE confirmed positive             Review of Systems  He feels like he is doing fairly well with his heart transplant situation.  He gets a little bit of leg swelling at times.  He tries to stay physically active.  His review of systems otherwise noncontributory.    OBJECTIVE:   /76 (BP Location: Right arm, Patient Position:  "Sitting, Cuff Size: Adult Regular)   Pulse 98   Temp 98.4  F (36.9  C) (Oral)   Ht 1.638 m (5' 4.5\")   Wt 86.7 kg (191 lb 3.2 oz)   SpO2 99%   BMI 32.31 kg/m   Estimated body mass index is 32.31 kg/m  as calculated from the following:    Height as of this encounter: 1.638 m (5' 4.5\").    Weight as of this encounter: 86.7 kg (191 lb 3.2 oz).  Physical Exam  GENERAL: alert, no distress and over weight  EYES: Eyes grossly normal to inspection, PERRL and conjunctivae and sclerae normal  HENT: Grossly normal  NECK: no adenopathy, no asymmetry, masses, or scars and thyroid normal to palpation  RESP: lungs clear to auscultation - no rales, rhonchi or wheezes  CV: regular rate and rhythm, normal S1 S2, no S3 or S4, no murmur, click or rub, trace to 1+ lower extremity edema  ABDOMEN: soft, nontender, no hepatosplenomegaly, no masses   MS: no gross musculoskeletal defects noted  SKIN: no suspicious lesions or rashes  NEURO: Normal strength and tone, mentation intact and speech normal  PSYCH: mentation appears normal, affect normal/bright    Diagnostic Test Results:  Labs reviewed in Epic    ASSESSMENT / PLAN:       ICD-10-CM    1. Encounter for Medicare annual wellness exam  Z00.00    2. Immunodeficiency due to conditions classified elsewhere (H)  D84.81    3. Need for pneumococcal vaccination  Z23    4. High priority for 2019-nCoV vaccine  Z23 COVID-19,PF,MODERNA (18+ Yrs BOOSTER .25mL)   5. Heart replaced by transplant (H)  Z94.1    6. Chronic systolic heart failure (H)  I50.22    7. CKD (chronic kidney disease) stage 4, GFR 15-29 ml/min (H)  N18.4    8. Type 2 diabetes mellitus with diabetic nephropathy, with long-term current use of insulin (H)  E11.21     Z79.4    9. Atrial flutter, unspecified type (H)  I48.92    10. Secondary renal hyperparathyroidism (H)  N25.81    11. 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 Acute embolism and thrombosis of right axillary vein (H)  I82.A11    13. Coronary " "artery disease involving nonautologous biological coronary bypass graft with angina pectoris (H)  I25.458      Blood pressure and other vital signs look acceptable  We discussed the above items  We will continue his routine care with his current specialty providers as per their recommendations  We will give him a COVID Moderna vaccine booster dose today given his immunosuppressed status, and also a Pneumovax 23  I suggested that he get a tetanus booster and a Shingrix vaccine from his local pharmacy in the near future   He can follow-up back in our clinic or in [place] for any ongoing primary care needs as desired    COUNSELING:  Reviewed preventive health counseling, as reflected in patient instructions       Regular exercise       Healthy diet/nutrition       Immunizations    Vaccinated for: Pneumococcal and Covid        Estimated body mass index is 32.31 kg/m  as calculated from the following:    Height as of this encounter: 1.638 m (5' 4.5\").    Weight as of this encounter: 86.7 kg (191 lb 3.2 oz).    Weight management plan: Discussed healthy diet and exercise guidelines    He reports that he has never smoked. He has never used smokeless tobacco.      Appropriate preventive services were discussed with this patient, including applicable screening as appropriate for cardiovascular disease, diabetes, osteopenia/osteoporosis, and glaucoma.  As appropriate for age/gender, discussed screening for colorectal cancer, prostate cancer, breast cancer, and cervical cancer. Checklist reviewing preventive services available has been given to the patient.    Reviewed patients plan of care and provided an AVS. The Basic Care Plan (routine screening as documented in Health Maintenance) for Lucian meets the Care Plan requirement. This Care Plan has been established and reviewed with the Patient and Spouse.    Counseling Resources:  ATP IV Guidelines  Pooled Cohorts Equation Calculator  Breast Cancer Risk Calculator  Breast " Cancer: Medication to Reduce Risk  FRAX Risk Assessment  ICSI Preventive Guidelines  Dietary Guidelines for Americans, 2010  [employer] MyPlate  ASA Prophylaxis  Lung CA Screening    Fracisco Casiano MD  [facility]    Identified Health Risks:

## 2022-03-03 DIAGNOSIS — E11.3513 TYPE 2 DIABETES MELLITUS WITH PROLIFERATIVE RETINOPATHY OF BOTH EYES AND MACULAR EDEMA, UNSPECIFIED WHETHER LONG TERM INSULIN USE (H): Primary | ICD-10-CM

## 2022-03-03 DIAGNOSIS — H25.89 OTHER AGE-RELATED CATARACT, UNSPECIFIED LATERALITY: ICD-10-CM

## 2022-03-10 ENCOUNTER — TELEPHONE (OUTPATIENT)
Dept: TRANSPLANT | Facility: CLINIC | Age: 67
End: 2022-03-10

## 2022-03-10 ENCOUNTER — OFFICE VISIT (OUTPATIENT)
Dept: OPHTHALMOLOGY | Facility: CLINIC | Age: 67
End: 2022-03-10
Attending: OPHTHALMOLOGY
Payer: COMMERCIAL

## 2022-03-10 DIAGNOSIS — H25.89 OTHER AGE-RELATED CATARACT, UNSPECIFIED LATERALITY: ICD-10-CM

## 2022-03-10 DIAGNOSIS — E11.3513 TYPE 2 DIABETES MELLITUS WITH PROLIFERATIVE RETINOPATHY OF BOTH EYES AND MACULAR EDEMA, UNSPECIFIED WHETHER LONG TERM INSULIN USE (H): ICD-10-CM

## 2022-03-10 DIAGNOSIS — Z94.1 HEART REPLACED BY TRANSPLANT (H): Primary | ICD-10-CM

## 2022-03-10 PROCEDURE — 250N000011 HC RX IP 250 OP 636: Performed by: OPHTHALMOLOGY

## 2022-03-10 PROCEDURE — 92012 INTRM OPH EXAM EST PATIENT: CPT | Mod: 25 | Performed by: OPHTHALMOLOGY

## 2022-03-10 PROCEDURE — 67028 INJECTION EYE DRUG: CPT | Mod: RT | Performed by: OPHTHALMOLOGY

## 2022-03-10 PROCEDURE — 76519 ECHO EXAM OF EYE: CPT | Performed by: OPHTHALMOLOGY

## 2022-03-10 PROCEDURE — G0463 HOSPITAL OUTPT CLINIC VISIT: HCPCS | Mod: 25

## 2022-03-10 PROCEDURE — 92134 CPTRZ OPH DX IMG PST SGM RTA: CPT | Performed by: OPHTHALMOLOGY

## 2022-03-10 RX ADMIN — Medication 1.25 MG: at 10:43

## 2022-03-10 ASSESSMENT — CONF VISUAL FIELD
OD_INFERIOR_TEMPORAL_RESTRICTION: 3
OS_SUPERIOR_NASAL_RESTRICTION: 1
OD_INFERIOR_NASAL_RESTRICTION: 3
OS_SUPERIOR_TEMPORAL_RESTRICTION: 3
OD_SUPERIOR_TEMPORAL_RESTRICTION: 3
OS_INFERIOR_TEMPORAL_RESTRICTION: 3
OD_SUPERIOR_NASAL_RESTRICTION: 3
OS_INFERIOR_NASAL_RESTRICTION: 1

## 2022-03-10 ASSESSMENT — SLIT LAMP EXAM - LIDS
COMMENTS: NORMAL
COMMENTS: NORMAL

## 2022-03-10 ASSESSMENT — EXTERNAL EXAM - LEFT EYE: OS_EXAM: NORMAL

## 2022-03-10 ASSESSMENT — TONOMETRY
OD_IOP_MMHG: 17
IOP_METHOD: TONOPEN
OS_IOP_MMHG: 16

## 2022-03-10 ASSESSMENT — EXTERNAL EXAM - RIGHT EYE: OD_EXAM: NORMAL

## 2022-03-10 ASSESSMENT — VISUAL ACUITY
OD_SC: 20/60
METHOD: SNELLEN - LINEAR
OS_SC: CF@1FT
OD_SC+: -2

## 2022-03-10 ASSESSMENT — CUP TO DISC RATIO: OD_RATIO: 0.25

## 2022-03-10 NOTE — NURSING NOTE
Chief Complaints and History of Present Illnesses   Patient presents with     Follow Up     Chief Complaint(s) and History of Present Illness(es)     Follow Up     Laterality: both eyes    Associated symptoms: Negative for eye pain, headache, floaters and flashes              Comments     Pt here for 6 week follow up on 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, IOL calculations and possible injection. Pt last injection of Avastin left eye 01/19/2022. Vision is stable since last exam. BS- 132.   Lab Results       Component                Value               Date                       A1C                      7.3                 11/17/2021                 A1C                      7.8                 10/15/2021                 A1C                      7.3                 07/19/2021                 A1C                      6.7                 01/14/2021                 A1C                      5.9                 12/07/2020                 A1C                      8.2                 07/03/2020                 A1C                      7.9                 07/08/2019                 A1C                      8.5                 03/11/2019            Pt using:  Latanoprost 1xnightly each eye   MANJU LOPES 9:09 AM March 10, 2022

## 2022-03-10 NOTE — PROGRESS NOTES
CC: Follow up proliferative diabetic retinopathy and slowly clearing vitreous hemorrhage right eye   Left eye status post PPV/MP/retinectomy 12/21/20. 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 Denies eye pain.    Interval Hx: Patient reports stable vision since last visit.     Taking latanoprost at bedtime right eye and Pred forte every other day left eye.     RETINAL IMAGING  Optical Coherence Tomography: 3/10/22  Right eye: interval increase in central IRF, worse today   Left eye: Irregular retina; ERM; nasal to fovea with large bullous edema, temporal with subretinal fibrosis / trace edema, Atrophic thinned retina IT macula. = stable     FA 1/19/22  right eye: PRP scars. Foci of NV superonasal.     Plan:   # Proliferative Diabetic Retinopathy, each eye   # Vitreous hemorrhage, right eye -  Resolving (onset 4/2021)   - last Avastin 1/24/2022 (6 week interval)    - status post Panretinal laser photocoagulation left eye     Plan today: avastin right eye today for CME given worsening as well as slight decrease in VA, RTC 4 weeks PRP fill in right eye      # Funnel RD left eye   - progressed to funnel RD after loss to follow up given heart surgeries     - w retina folded on itself under SO   - guarded prognosis discussed   - Continue PF every other day left eye    # Ocuar HTN    - Continue Latanoprost hs, recommended both eyes    - IOP wnl     # Visually significant cataract right eye    - IOL calcs obtained and A-scan completed 3/10/2022   - improve CME prior to CEIOL    - Possible plan for CEIOL right eye  in the spring    Visually significant:YES  Glare: Positive   Reports impacts ADL   Dilation:Good  Iris expansion: possible; pupil dilated to 7 mm  Pseudoexfoliation: NO  Trypan Blue: possible  Phacodonesis: No  Cornea guttae: rare  Aim for: -0.5  Start artificial tears twice a day and as needed    Anesthesia: peribulbar block  Surgical time: 30 min   Candidate for multifocal: NO  Candidate for toric  IOL: NO  Anticoagulants: aspirin  Alpha blockers/Flomax: NO  Monocular patient     I reviewed the indications, risks, benefits, and alternatives of the proposed surgical procedure. We discussed at length cataracts and the effect of the cataracts on vision.   We discussed the fact that modern cataract surgery is usually successful at alleviating symptoms of glare when the cataract is the causative factor. Other risks were discussed with patient including, but not limited to, failure to improve vision or further loss of vision,  and need for additional surgery, bleeding, infection, loss of vision and the remote possibility of complications of anesthesia. 1:1000 risk of infection/bleed/loss of eye; 1:100 risk of RD and need for further surgery. Patient agreed to proceed with surgery.  I provided multiple opportunities for the questions, answered all questions to the best of my ability, and confirmed that my answers and my discussion were understood.       Plan Avastin inj today   Follow up in 6 weeks     Brittany Osullivan MD  Ophthalmology Resident PGY3  [facility]     ~~~~~~~~~~~~~~~~~~~~~~~~~~~~~~~~~~   Complete documentation of historical and exam elements from today's encounter can be found in the full encounter summary report (not reduplicated in this progress note).  I personally obtained the chief complaint(s) and history of present illness.  I confirmed and edited as necessary the review of systems, past medical/surgical history, family history, social history, and examination findings as documented by others; and I examined the patient myself.  I personally reviewed the relevant tests, images, and reports as documented above.  I personally reviewed the ophthalmic test(s) associated with this encounter, agree with the interpretation(s) as documented by the resident/fellow, and have edited the corresponding report(s) as necessary.   I formulated and edited as necessary the assessment and plan and  discussed the findings and management plan with the patient and family and I was present for critical portions of the procedure carried out by the resident/fellow and immediately available for the entire procedure    Triny Bunch MD   of Ophthalmology.  Retina Service   Department of Ophthalmology and Visual Neurosciences   [facility]  Phone: [phone]   Fax: [phone]

## 2022-03-13 NOTE — PROGRESS NOTES
March 14, 2022  [facility]    HPI:   Mr. Lucian Oliveira is a 66yr old male with a history of CAD (s/p 3v CABG 2008), ICM s/p OHT 7/19/20, DMII, CKD, and HTN who presents to clinic for routine follow up.  A professional  was used for this visit.     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  He ultimately discharged 8/3/20.     He has been readmitted as follows:  - 8/24/20-8/28/20:  hyperglycemia (BGs 500s), thought to be secondary to incorrect home insulin administration  - 9/22/20-10/5/20:  fevers and drainage from former ICD site, with Chest/abd CT concerning for cellulitis in the epigastric region.  He underwent surgical debridement of his left infraclavicular wound and debridement of midline chest tube wound on 9/23, which was c/b bleeding and required reoperation 9/28 for I&D of hematoma.  Cultures were positive for Pseudomonas aeruginosa, so he was treated with 4 weeks of cipro per Transplant ID and CVTS.  He developed leukopenia and moderate neutropenia, so his cellcept was held then restarted at low dose, and valcyte was stopped.  He received neupogen x 1.  He ultimately discharged with a wound vac.  - 12/9/20:  ED visit for right vision loss, and found to have a vitreous hemorrhage in his right eye and total retinal detachment of his left eye per ophthalmology.  He was then seen in the eye clinic 12/10, where he was advised an Avastin injection in the right eye (for proliferative diabetic retinopathy and vitreous hemorrhage).  He then underwent 27 gauge pars plana vitectomy, membrane peel, perfluoroctane liquid (PFO), retinectomy, endolaser  12/21 for the tractional retinal detachment of his left eye.  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    Previously we had switched him to sirolimus/tacrolimus combination therapy due to worsening renal function. Since his last visit, he has been feeling well. Continues to have vision problems in both eyes for which he is seeing ophthalmology and is receiving avastin injections following diagnosis of vitreous hemorrhage. He also had retinal detachment of the left eye and underwent PPV/MP/retinectomy in 12/2020. Plans to undergo surgery on his right eye.    Denies any new concerns. He reports stable baseline shortness of breath. Stable lower extremity edema. Denies orthopnea. Denies PND. Denies lightheadedness, dizziness, fevers, chills. Did have diarrhea starting yesterday. 3 episodes. Blames it on a hamburger he ate. Seems to be improving today. Mild brown unformed stool. Denies blood in stool.     PAST MEDICAL HISTORY:  Past Medical History:   Diagnosis Date     CAD (coronary artery disease)      CHF (congestive heart failure) (H)      CKD (chronic kidney disease), stage III (H)      Cortical cataract of both eyes      Diabetes (H)      Hyperlipidemia      Hypertension      Infection due to Streptococcus mitis group 09/23/2020     Ischemic cardiomyopathy      Obesity      CHANTEL (obstructive sleep apnea)     occas cpap     CHANTEL (obstructive sleep apnea)- severe (AHI 30)      Osteoarthritis          FAMILY HISTORY:  Family History   Problem Relation Age of Onset     Diabetes Brother      Diabetes Sister      Diabetes Sister      Macular Degeneration No family hx of      Glaucoma No family hx of      Myocardial Infarction No family hx of      Kidney Disease No family hx of          SOCIAL HISTORY:  Social History     Socioeconomic History     Marital status:      Spouse name: None     Number of children: 4     Years of education: None     Highest education level: None   Occupational History      Occupation:      Employer: [employer]     Employer: RETIRED   Tobacco Use     Smoking status: Never Smoker     Smokeless tobacco: Never Used     Tobacco comment: Never smoked; non-smoking household   Vaping Use     Vaping Use: Never used   Substance and Sexual Activity     Alcohol use: No     Alcohol/week: 0.0 standard drinks     Drug use: No     Sexual activity: Yes     Partners: Female   Other Topics Concern     Parent/sibling w/ CABG, MI or angioplasty before 65F 55M? No   Social History Narrative     None     Social Determinants of Health     Financial Resource Strain: Not on file   Food Insecurity: Not on file   Transportation Needs: Not on file   Physical Activity: Not on file   Stress: Not on file   Social Connections: Not on file   Intimate Partner Violence: Not on file   Housing Stability: Not on file         CURRENT MEDICATIONS:  acetaminophen (TYLENOL) 325 MG tablet, Take 3 tablets (975 mg) by mouth every 8 hours as needed for mild pain  Alcohol Swabs PADS, Use to swab the area of the injection or sergio as directed   Per insurance coverage  aspirin (ASA) 81 MG chewable tablet, Take 1 tablet (81 mg) by mouth daily  atropine 1 % ophthalmic solution, Place 1 drop Into the left eye daily  blood glucose (NO BRAND SPECIFIED) test strip, Use to test blood sugar 4 times daily or as directed.  blood glucose monitoring (ACCU-CHEK FELIPE SMARTVIEW) meter device kit, Use to test blood sugar 3-4 times daily, as directed.  blood glucose monitoring (SOFTCLIX) lancets, 1 each 4 times daily Use to test blood sugars 2 times daily.  Calcium Carbonate-Vitamin D3 600-400 MG-UNIT TABS, TAKE ONE TABLET BY MOUTH TWICE A DAY WITH MEALS  Continuous Blood Gluc Sensor (FREESTYLE JEREMY 14 DAY SENSOR) MISC, Change every 14 days.  Continuous Blood Gluc Sensor (FREESTYLE JEREMY 14 DAY SENSOR) MISC, Change every 14 days.  dulaglutide (TRULICITY) 1.5 MG/0.5ML pen, Inject 1.5 mg Subcutaneous every 7 days  furosemide (LASIX)  20 MG tablet, Take 1 tablet (20 mg) by mouth daily  HUMALOG KWIKPEN 100 UNIT/ML soln, Inject 0-31 Units Subcutaneous 3 times daily (with meals) + Custom MEAL and SNACK corrective dosing   Approx 90 units daily max  hydrALAZINE (APRESOLINE) 100 MG tablet, Take 1 tablet (100 mg) by mouth every 8 hours  insulin aspart (NOVOLOG PEN) 100 UNIT/ML pen, Inject 0-31 Units Subcutaneous 3 times daily (with meals) Custom MEAL and SNACK corrective dosing     BG less than 80, do not give Novolog.  BG , give  15 units.  -169, give  17 units.  -199 give 19 units.  -229 give 21 units.  -259 give 23 units.  -289 give 25 units.  -319 give 27 units.  -349 give 29 units.  BG >/= 350 give 31 units.  To be given with meal based on pre-meal blood glucose  insulin isophane human (HUMULIN N PEN) 100 UNIT/ML injection, Inject 35 Units Subcutaneous every morning (Patient taking differently: Inject 40 Units Subcutaneous every morning )  insulin isophane human (HUMULIN N PEN) 100 UNIT/ML injection, Inject 8 Units Subcutaneous At Bedtime (Patient taking differently: Inject 12 Units Subcutaneous At Bedtime )  insulin pen needle (32G X 4 MM) 32G X 4 MM miscellaneous, Use 5-6 pen needles daily or as directed.  latanoprost (XALATAN) 0.005 % ophthalmic solution, Place 1 drop into both eyes At Bedtime  magnesium oxide (MAG-OX) 400 (241.3 Mg) MG tablet, TAKE ONE TABLET BY MOUTH TWICE A DAY  neomycin-polymixin-dexamethasone (MAXITROL) 0.1 % ophthalmic suspension, Apply 1 drop to eye 4 times daily Instill into operative eye(s) per physician instructions.  neomycin-polymixin-dexamethasone (MAXITROL) ophthalmic ointment, Place 1 Application Into the left eye At Bedtime   order for DME, Auto CPAP 8-15 cmH20  pantoprazole (PROTONIX) 40 MG EC tablet,   prednisoLONE acetate (PRED FORTE) 1 % ophthalmic suspension, Place 1-2 drops Into the left eye every other day  prednisoLONE acetate (PRED FORTE) 1 % ophthalmic  suspension, Place 1-2 drops Into the left eye 2 times daily  rosuvastatin (CRESTOR) 10 MG tablet, TAKE ONE TABLET BY MOUTH ONCE DAILY  senna-docusate (SENOKOT-S/PERICOLACE) 8.6-50 MG tablet, Take 1 tablet by mouth 2 times daily as needed (hold for loose stools)  sirolimus (GENERIC EQUIVALENT) 0.5 MG tablet, Take 4 tablets (2 mg) by mouth daily  sulfamethoxazole-trimethoprim (BACTRIM) 400-80 MG tablet, Take 1 tablet by mouth three times a week  tacrolimus (GENERIC EQUIVALENT) 1 MG capsule, Take 3 capsules (3 mg) by mouth every morning AND 3 capsules (3 mg) every evening.  tamsulosin (FLOMAX) 0.4 MG capsule, Take 1 capsule (0.4 mg) by mouth daily    bevacizumab (AVASTIN) intravitreal inj 1.25 mg          ROS:  See HPI    EXAM:  /72 (BP Location: Right arm, Patient Position: Chair, Cuff Size: Adult Regular)   Pulse 104   Wt 85.7 kg (189 lb)   SpO2 97%   BMI 31.94 kg/m    GENERAL: Appears comfortable, in no acute distress.   HEENT: Eye symmetrical, no discharge or icterus bilaterally. Mucous membranes moist and without lesions. No thrush.   CV: RRR, +S1S2, no murmur, rub, or gallop. JVP 7cm.   RESPIRATORY: Respirations regular, even, and unlabored. Lungs CTA throughout.   GI: Soft and non distended with normoactive bowel sounds present in all quadrants. No tenderness, rebound, guarding. No hepatomegaly.   EXTREMITIES: 2+ peripheral edema. 2+ bilateral pedal pulses.   NEUROLOGIC: Alert and oriented x 3. No focal deficits.   MUSCULOSKELETAL: No joint swelling or tenderness.   SKIN: No jaundice. No rashes or lesions.     Labs - reviewed with patient in clinic today:  CBC RESULTS:  Lab Results   Component Value Date    NTBNPI 8,792 (H) 09/22/2020    NTBNPI 8,357 (H) 08/24/2020    NTBNPI 8,927 (H) 07/04/2020    NTBNP 6,187 (H) 11/25/2019    NTBNP 2,036 (H) 08/26/2019    NTBNP 1,577 (H) 05/07/2019     Lab Results   Component Value Date    WBC 6.1 03/14/2022    WBC 5.2 11/17/2021    WBC 4.1 09/28/2021    HGB 11.4 (L)  03/14/2022    HGB 12.0 (L) 11/17/2021    HGB 12.4 (L) 09/28/2021    HCT 35.7 (L) 03/14/2022    HCT 37.0 (L) 11/17/2021    HCT 38.3 (L) 09/28/2021    MCV 86 03/14/2022    MCV 89 11/17/2021    MCV 93 09/28/2021     03/14/2022     11/17/2021     09/28/2021     No results found for: DD, DIMER  No results found for: SED  Lab Results   Component Value Date    AST 22 03/14/2022    AST 17 11/17/2021    AST 22 07/19/2021    ALT 20 03/14/2022    ALT 24 11/17/2021    ALT 21 07/19/2021    ALKPHOS 118 03/14/2022    ALKPHOS 120 11/17/2021    ALKPHOS 90 07/19/2021    BILITOTAL 0.4 03/14/2022    BILITOTAL 0.4 11/17/2021    BILITOTAL 0.5 07/19/2021     Lab Results   Component Value Date    TSH 0.80 08/05/2020    TSH 1.30 07/07/2020    TSH 1.25 07/04/2020       Recent Labs   Lab Test 11/17/21  0848 09/28/21  1031 07/06/16  0717 06/27/15  0755 01/11/15  1033   CHOL 127 151   < > 82 203*   HDL 35* 37*   < > 32* 34*   LDL 28 58   < > 31 Cannot estimate LDL when triglyceride exceeds 400 mg/dL  107   TRIG 322* 278*   < > 99 519*   CHOLHDLRATIO  --   --   --  2.6 6.0*    < > = values in this interval not displayed.     IMMUNOSUPPRESSANT LEVELS:  Lab Results   Component Value Date    TACROL 4.5 12/08/2021    TACROL 5.6 05/11/2021    DOSTAC  12/08/2021      Comment:      Last dose information not provided.    DOSTAC  7pm 5/10/21 05/11/2021    RAPAMY 4.8 11/17/2021       Diagnostic Studies:  Echo 3/14/2022  Interpretation Summary  Global and regional left ventricular function is normal with an EF of 60-65%.  Right ventricular function, chamber size, wall motion, and thickness are  normal.  Pulmonary artery systolic pressure cannot be assessed.  The inferior vena cava is normal.  No pericardial effusion is present.  No significant changes noted.    RHC 9/28/2021  Mean RA: 10mmHg  RV: 35/10 mmHg  PA: 34/16 (24) mmHg  Mean WP: 12mmHg    PA sat: 70.9%  Ao sat (O2 sat): 98%  Hb: 13 g/dL  HR: 90 bpm    Jacy CO: 5 L/min  Jacy  CI: 2.6 L/min/m2    TD CO: 5.2 L/min  TD CI: 2.7 L/min/m2  PVR: 2.4 PERRY  TPR: 4.8 PERRY    Coronary Angiogram 7/20/2021  Angiographically normal coronary arteries    Assessment/Plan:  Mr. Lucian Oliveira is a 66yr old male with a history of CAD (s/p 3v CABG 2008), ICM s/p OHT 7/19/20, DMII, CKD, and HTN who presents to clinic for routine follow up.  A professional  was used for this visit.     Labs today show stable electrolytes.  Creatinine stable 1.9.  Liver function and blood counts remained stable.  CMV, AlloMap, sirolimus, and tacrolimus levels are in process.     Echocardiogram from today shows stable graft function, with LVEF 60-65%, normal RV size/function, and normal valvular function.     Mr. Oliveira appears well today.  His weight trend remained stable. He has lower extremity edema, but this has been present in the setting of normal invasive hemodynamics.      He has been on sirolimus/tacrolimus now and is doing well. His renal function has stabilized. Previously we discussed discontinuing tacrolimus in favor of sirolimus/MMF. We will continue tacrolimus/sirolimus for now.      Otherwise, advised that he continue his current medications, with no changes.  We will plan for him to follow-up in clinic per protocol, or sooner should new concerns arise.    No concerns from out standpoint in undergoing eye surgery.         ICM, s/p OHT 7/19/20  His post-operative course was c/b primary graft dysfunction (LVEF 20-25% and severe RV dysfunction, thought to be secondary to prolonged ischemic time, resolved by f/up TTE 7/27/20), VT, pericardial effusion (incidentally found per TTE 7/27, decreased size noted on TTE 7/29), donor streptococcus salivarius bacteremia (s/p 7d course of ceftriaxone), and RUE DVT c/b HIT (s/p PLEX).  He ultimately discharged 8/3/20.     Rejection history:  none  AlloMap scores:  <35  DSAs:  none  Coronary angio/Ischemic eval: Baseline coronary angiogram 7/2020 showed normal coronary  arteries, with no angiographic signs of obstructive CAV.  Last RHC:  9/28/21 showed normal biventricular filling pressures, with RA 10, mPA 24, PCW 12, and CI 2.6.  Echo/cMRI: Last TTE 7/2021 showed stable graft, function, with LVEF 60-65%, normal RV size/function, and no valvular disease.     Serostatus:  - CMV D+/R+  - EBV D+/R+  - Toxo D+/R-     Immunosuppression:  - Sirolimus, goal level 3-5.  Level today in process.  - tacro, goal level 3-5.  Level today in process.     PPx:  - CAV:  Aspirin 81mg daily and rosuvastatin 10mg daily  - GI:  Pantoprazole 40mg daily  - Osteoporosis:  Calcium/vitamin D supplements  - Toxo:  Single strength bactrim, lifelong ppx     Graft function:  - BPs:  Stable, continue hydral 100mg TID  - fluid status:  Euvolemic, taking lasix 20mg daily     Health maintenance:  - derm exam - seen 2/16/2022, benign lesions  - eye exam UTD   - dental exam overdue, last exam was over 1 year ago  - last colo 2019, due 2022  - completed COVID shots, 3/3  - received flu shot   - pneumonia - received penrumovax 23 (3/2/22)  - needs shingrix        Hypertriglyceridemia  Triglycerides are 322 today.  Will review ongoing plan with Dr. Sheridan.  Again discussed the importance of heart healthy dieting and regular aerobic activity.     DMII  Follows with endo and PCP.  HgbA1c down slightly today.     HIT  HIT antibody + 7/2020.  CORRINE +.  No heparin products.     RIJ and axillary vein DVT, nonocclusive  Right cephalic vein occlusive thrombus  S/p course of anticoagulation.     Total retinal detachment of the left eye, s/p retinectomy 12/21/20  Proliferative diabetic retinopathy  Right vitreous hemorrhage  He presented to the ED 12/9/20 with right vision loss.  Ophthalmology was consulted, and found that he had a vitreous hemorrhage in his right eye and total retinal detachment of his left eye.  He was then seen in the eye clinic 12/10/20, where he was advised an Avastin injection in the right eye (for  "proliferative diabetic retinopathy and vitreous hemorrhage).  He then underwent \"27 gauge pars plana vitectomy, membrane peel, perfluoroctane liquid (PFO), retinectomy, endolaser\" 12/21/20 for the tractional retinal detachment of his left eye.  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        Patient evaluated and discussed with Dr. Sheridan.    James Duque MD   Cardiology Fellow, PGY-5  March 14, 2022  12:21 PM      I examined the patient and agree with the assessment and plan of Dr. Duque.      Total time today was 40 minutes reviewing notes, imaging, labs, patient visit, orders and documentation         Arvin Sheridan MD   [facility]  Heart Failure, Transplant, and Mechanical Circulatory Support Cardiology   Cardiovascular Division  [facility]   [phone]      "

## 2022-03-14 ENCOUNTER — LAB (OUTPATIENT)
Dept: LAB | Facility: CLINIC | Age: 67
End: 2022-03-14
Payer: COMMERCIAL

## 2022-03-14 ENCOUNTER — ANCILLARY PROCEDURE (OUTPATIENT)
Dept: CARDIOLOGY | Facility: CLINIC | Age: 67
End: 2022-03-14
Attending: INTERNAL MEDICINE
Payer: COMMERCIAL

## 2022-03-14 VITALS
HEART RATE: 104 BPM | WEIGHT: 189 LBS | DIASTOLIC BLOOD PRESSURE: 72 MMHG | SYSTOLIC BLOOD PRESSURE: 137 MMHG | OXYGEN SATURATION: 97 % | BODY MASS INDEX: 31.94 KG/M2

## 2022-03-14 DIAGNOSIS — Z94.1 HEART REPLACED BY TRANSPLANT (H): ICD-10-CM

## 2022-03-14 DIAGNOSIS — E11.9 TYPE 2 DIABETES MELLITUS WITHOUT COMPLICATION, UNSPECIFIED WHETHER LONG TERM INSULIN USE (H): Primary | ICD-10-CM

## 2022-03-14 DIAGNOSIS — Z84.89 FAMILY HISTORY OF PROTEINURIA SYNDROME: ICD-10-CM

## 2022-03-14 LAB
ALBUMIN SERPL-MCNC: 3.2 G/DL (ref 3.4–5)
ALP SERPL-CCNC: 118 U/L (ref 40–150)
ALT SERPL W P-5'-P-CCNC: 20 U/L (ref 0–70)
ANION GAP SERPL CALCULATED.3IONS-SCNC: 6 MMOL/L (ref 3–14)
AST SERPL W P-5'-P-CCNC: 22 U/L (ref 0–45)
BASOPHILS # BLD AUTO: 0 10E3/UL (ref 0–0.2)
BASOPHILS NFR BLD AUTO: 0 %
BILIRUB SERPL-MCNC: 0.4 MG/DL (ref 0.2–1.3)
BUN SERPL-MCNC: 29 MG/DL (ref 7–30)
CALCIUM SERPL-MCNC: 8.7 MG/DL (ref 8.5–10.1)
CHLORIDE BLD-SCNC: 109 MMOL/L (ref 94–109)
CO2 SERPL-SCNC: 26 MMOL/L (ref 20–32)
CREAT SERPL-MCNC: 1.94 MG/DL (ref 0.66–1.25)
CREAT UR-MCNC: 244 MG/DL
EOSINOPHIL # BLD AUTO: 0 10E3/UL (ref 0–0.7)
EOSINOPHIL NFR BLD AUTO: 1 %
ERYTHROCYTE [DISTWIDTH] IN BLOOD BY AUTOMATED COUNT: 14.8 % (ref 10–15)
GFR SERPL CREATININE-BSD FRML MDRD: 37 ML/MIN/1.73M2
GLUCOSE BLD-MCNC: 175 MG/DL (ref 70–99)
HBA1C MFR BLD: 7.4 % (ref 0–5.6)
HCT VFR BLD AUTO: 35.7 % (ref 40–53)
HGB BLD-MCNC: 11.4 G/DL (ref 13.3–17.7)
IMM GRANULOCYTES # BLD: 0 10E3/UL
IMM GRANULOCYTES NFR BLD: 1 %
LVEF ECHO: NORMAL
LYMPHOCYTES # BLD AUTO: 1.1 10E3/UL (ref 0.8–5.3)
LYMPHOCYTES NFR BLD AUTO: 18 %
MAGNESIUM SERPL-MCNC: 1.8 MG/DL (ref 1.6–2.3)
MCH RBC QN AUTO: 27.3 PG (ref 26.5–33)
MCHC RBC AUTO-ENTMCNC: 31.9 G/DL (ref 31.5–36.5)
MCV RBC AUTO: 86 FL (ref 78–100)
MONOCYTES # BLD AUTO: 0.6 10E3/UL (ref 0–1.3)
MONOCYTES NFR BLD AUTO: 10 %
NEUTROPHILS # BLD AUTO: 4.3 10E3/UL (ref 1.6–8.3)
NEUTROPHILS NFR BLD AUTO: 70 %
NRBC # BLD AUTO: 0 10E3/UL
NRBC BLD AUTO-RTO: 0 /100
PHOSPHATE SERPL-MCNC: 2 MG/DL (ref 2.5–4.5)
PLATELET # BLD AUTO: 199 10E3/UL (ref 150–450)
POTASSIUM BLD-SCNC: 4.4 MMOL/L (ref 3.4–5.3)
PROT SERPL-MCNC: 6.5 G/DL (ref 6.8–8.8)
PROT UR-MCNC: 0.71 G/L
PROT/CREAT 24H UR: 0.29 G/G CR (ref 0–0.2)
RBC # BLD AUTO: 4.17 10E6/UL (ref 4.4–5.9)
SIROLIMUS BLD-MCNC: 5 UG/L (ref 5–15)
SODIUM SERPL-SCNC: 141 MMOL/L (ref 133–144)
TACROLIMUS BLD-MCNC: 3.9 UG/L (ref 5–15)
TME LAST DOSE: ABNORMAL H
TME LAST DOSE: ABNORMAL H
TME LAST DOSE: NORMAL H
TME LAST DOSE: NORMAL H
WBC # BLD AUTO: 6.1 10E3/UL (ref 4–11)

## 2022-03-14 PROCEDURE — 80195 ASSAY OF SIROLIMUS: CPT | Performed by: INTERNAL MEDICINE

## 2022-03-14 PROCEDURE — 84156 ASSAY OF PROTEIN URINE: CPT | Performed by: PATHOLOGY

## 2022-03-14 PROCEDURE — 80197 ASSAY OF TACROLIMUS: CPT | Performed by: INTERNAL MEDICINE

## 2022-03-14 PROCEDURE — 83735 ASSAY OF MAGNESIUM: CPT | Performed by: PATHOLOGY

## 2022-03-14 PROCEDURE — 93306 TTE W/DOPPLER COMPLETE: CPT | Performed by: INTERNAL MEDICINE

## 2022-03-14 PROCEDURE — 99215 OFFICE O/P EST HI 40 MIN: CPT | Mod: 25 | Performed by: INTERNAL MEDICINE

## 2022-03-14 PROCEDURE — 80053 COMPREHEN METABOLIC PANEL: CPT | Performed by: PATHOLOGY

## 2022-03-14 PROCEDURE — 85025 COMPLETE CBC W/AUTO DIFF WBC: CPT | Performed by: PATHOLOGY

## 2022-03-14 PROCEDURE — G0463 HOSPITAL OUTPT CLINIC VISIT: HCPCS

## 2022-03-14 PROCEDURE — 84100 ASSAY OF PHOSPHORUS: CPT | Performed by: PATHOLOGY

## 2022-03-14 PROCEDURE — 83036 HEMOGLOBIN GLYCOSYLATED A1C: CPT | Performed by: INTERNAL MEDICINE

## 2022-03-14 PROCEDURE — 36415 COLL VENOUS BLD VENIPUNCTURE: CPT | Performed by: PATHOLOGY

## 2022-03-14 ASSESSMENT — PAIN SCALES - GENERAL: PAINLEVEL: NO PAIN (0)

## 2022-03-14 NOTE — PATIENT INSTRUCTIONS
Will call with pending results: allomap, sirolimus, tacrolimus.    No additional changes at this time.    Okay to have any needed eye surgery.     Continue to walk/exercise to help with diabetes and heart.    **Return in 4 months for your 2 year heart transplant visit with labs, chest xray, cardiac MRI, RHC/bx/biplane angiogram/IVUS. Transplant office will call to schedule.     Call [phone], option 2, OR MyChart, with any questions or concerns.

## 2022-03-14 NOTE — NURSING NOTE
Transplant Coordinator Note     Reason for visit: 20 month visit   Coordinator: Present   Caregiver:  wife Shivani    Health concerns addressed today:   1. Detached retina, may need surgery--h/o diabetes; A1C added. Okay to have any needed surgery.  2. Echo reviewed, WNL; allomap pending.  3. Pt doing well and does not have any complaints.      Immunosuppressants:   Tacro 3/3   Rapa 2mg daily     Routine screenings:     Derm: Up to date   Dental: Due  Colonoscopy: Due 2022  Breast/Prostate: Normal PSA   Eye: Follows closely with optho for hx of detached retina   Flu/Pneumonia: Flu shot done, Covid x3, Pneumonia UTD, Shingrix: ?        Labs:   Reviewed in clinic. Pending allomap, siroilmus, tacrolimus.    Hgb A1C and Protein, creatinine ratio to check for proteinuria added.    Additional Notes:   No rejection to date; no DSAs    Renal function stable- has been in the 2 range for awhile now.     CMV: D+/R+, EBV: D+/R+, Toxo: D+/R- (lifelong Bactrim 3 times a week)     Plan of care/instructions reviewed with patient/wife per fellow Dr. Gallardo.   Medication record reviewed and reconciled   Questions and concerns addressed   Pt verbalized an understanding of plan of care.     Patient Instructions   Will call with pending results: allomap, sirolimus, tacrolimus.    No additional changes at this time.    Okay to have any needed eye surgery.     Continue to walk/exercise to help with diabetes and heart.     Next transplant clinic appointment:  in 4 months for your 2 year heart transplant visit with labs, chest xray, cardiac MRI, RHC/bx/biplane angiogram/IVUS.  Next lab draw: pending drug levels.  Coordinator will call with all pending results.     Please call transplant coordinator with any questions:     Baylee Herbert RN BSN   Post Heart Transplant Nurse Coordinator   [facility]   Questions: [phone]

## 2022-03-14 NOTE — LETTER
3/14/2022      RE: Lucian Oliveira  [address] [facility] [address]       Dear Colleague,    Thank you for the opportunity to participate in the care of your patient, Lucian Oliveira, at the [facility] [place] [facility]. Please see a copy of my visit note below.        March 14, 2022  ADULT HEART TRANSPLANT CLINIC    HPI:   Mr. Lucian Oliveira is a 66yr old male with a history of CAD (s/p 3v CABG 2008), ICM s/p OHT 7/19/20, DMII, CKD, and HTN who presents to clinic for routine follow up.  A professional  was used for this visit.     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  He ultimately discharged 8/3/20.     He has been readmitted as follows:  - 8/24/20-8/28/20:  hyperglycemia (BGs 500s), thought to be secondary to incorrect home insulin administration  - 9/22/20-10/5/20:  fevers and drainage from former ICD site, with Chest/abd CT concerning for cellulitis in the epigastric region.  He underwent surgical debridement of his left infraclavicular wound and debridement of midline chest tube wound on 9/23, which was c/b bleeding and required reoperation 9/28 for I&D of hematoma.  Cultures were positive for Pseudomonas aeruginosa, so he was treated with 4 weeks of cipro per Transplant ID and CVTS.  He developed leukopenia and moderate neutropenia, so his cellcept was held then restarted at low dose, and valcyte was stopped.  He received neupogen x 1.  He ultimately discharged with a wound vac.  - 12/9/20:  ED visit for right vision loss, and found to have a vitreous hemorrhage in his  right eye and total retinal detachment of his left eye per ophthalmology.  He was then seen in the eye clinic 12/10, where he was advised an Avastin injection in the right eye (for proliferative diabetic retinopathy and vitreous hemorrhage).  He then underwent 27 gauge pars plana vitectomy, membrane peel, perfluoroctane liquid (PFO), retinectomy, endolaser 12/21 for the tractional retinal detachment of his left eye.  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    Previously we had switched him to sirolimus/tacrolimus combination therapy due to worsening renal function. Since his last visit, he has been feeling well. Continues to have vision problems in both eyes for which he is seeing ophthalmology and is receiving avastin injections following diagnosis of vitreous hemorrhage. He also had retinal detachment of the left eye and underwent PPV/MP/retinectomy in 12/2020. Plans to undergo surgery on his right eye.    Denies any new concerns. He reports stable baseline shortness of breath. Stable lower extremity edema. Denies orthopnea. Denies PND. Denies lightheadedness, dizziness, fevers, chills. Did have diarrhea starting yesterday. 3 episodes. Blames it on a hamburger he ate. Seems to be improving today. Mild brown unformed stool. Denies blood in stool.     PAST MEDICAL HISTORY:  Past Medical History:   Diagnosis Date     CAD (coronary artery disease)      CHF (congestive heart failure) (H)      CKD (chronic kidney disease), stage III (H)      Cortical cataract of both eyes      Diabetes (H)      Hyperlipidemia      Hypertension      Infection due to Streptococcus mitis group 09/23/2020     Ischemic cardiomyopathy      Obesity      CHANTEL (obstructive sleep apnea)     occas cpap     CHANTEL (obstructive sleep apnea)- severe (AHI 30)      Osteoarthritis          FAMILY HISTORY:  Family History   Problem Relation Age of Onset     Diabetes Brother      Diabetes Sister      Diabetes Sister      Macular Degeneration No  family hx of      Glaucoma No family hx of      Myocardial Infarction No family hx of      Kidney Disease No family hx of          SOCIAL HISTORY:  Social History     Socioeconomic History     Marital status:      Spouse name: None     Number of children: 4     Years of education: None     Highest education level: None   Occupational History     Occupation:      Employer: [employer]     Employer: RETIRED   Tobacco Use     Smoking status: Never Smoker     Smokeless tobacco: Never Used     Tobacco comment: Never smoked; non-smoking household   Vaping Use     Vaping Use: Never used   Substance and Sexual Activity     Alcohol use: No     Alcohol/week: 0.0 standard drinks     Drug use: No     Sexual activity: Yes     Partners: Female   Other Topics Concern     Parent/sibling w/ CABG, MI or angioplasty before 65F 55M? No   Social History Narrative     None     Social Determinants of Health     Financial Resource Strain: Not on file   Food Insecurity: Not on file   Transportation Needs: Not on file   Physical Activity: Not on file   Stress: Not on file   Social Connections: Not on file   Intimate Partner Violence: Not on file   Housing Stability: Not on file         CURRENT MEDICATIONS:  acetaminophen (TYLENOL) 325 MG tablet, Take 3 tablets (975 mg) by mouth every 8 hours as needed for mild pain  Alcohol Swabs PADS, Use to swab the area of the injection or sergio as directed   Per insurance coverage  aspirin (ASA) 81 MG chewable tablet, Take 1 tablet (81 mg) by mouth daily  atropine 1 % ophthalmic solution, Place 1 drop Into the left eye daily  blood glucose (NO BRAND SPECIFIED) test strip, Use to test blood sugar 4 times daily or as directed.  blood glucose monitoring (ACCU-CHEK FELIPE SMARTVIEW) meter device kit, Use to test blood sugar 3-4 times daily, as directed.  blood glucose monitoring (SOFTCLIX) lancets, 1 each 4 times daily Use to test blood sugars 2 times daily.  Calcium Carbonate-Vitamin  D3 600-400 MG-UNIT TABS, TAKE ONE TABLET BY MOUTH TWICE A DAY WITH MEALS  Continuous Blood Gluc Sensor (FREESTYLE JEREMY 14 DAY SENSOR) [facility] [place] Change every 14 days.  Continuous Blood Gluc Sensor (FREESTYLE JEREMY 14 DAY SENSOR) MIS, Change every 14 days.  dulaglutide (TRULICITY) 1.5 MG/0.5ML pen, Inject 1.5 mg Subcutaneous every 7 days  furosemide (LASIX) 20 MG tablet, Take 1 tablet (20 mg) by mouth daily  HUMALOG KWIKPEN 100 UNIT/ML soln, Inject 0-31 Units Subcutaneous 3 times daily (with meals) + Custom MEAL and SNACK corrective dosing   Approx 90 units daily max  hydrALAZINE (APRESOLINE) 100 MG tablet, Take 1 tablet (100 mg) by mouth every 8 hours  insulin aspart (NOVOLOG PEN) 100 UNIT/ML pen, Inject 0-31 Units Subcutaneous 3 times daily (with meals) Custom MEAL and SNACK corrective dosing     BG less than 80, do not give Novolog.  BG , give  15 units.  -169, give  17 units.  -199 give 19 units.  -229 give 21 units.  -259 give 23 units.  -289 give 25 units.  -319 give 27 units.  -349 give 29 units.  BG >/= 350 give 31 units.  To be given with meal based on pre-meal blood glucose  insulin isophane human (HUMULIN N PEN) 100 UNIT/ML injection, Inject 35 Units Subcutaneous every morning (Patient taking differently: Inject 40 Units Subcutaneous every morning )  insulin isophane human (HUMULIN N PEN) 100 UNIT/ML injection, Inject 8 Units Subcutaneous At Bedtime (Patient taking differently: Inject 12 Units Subcutaneous At Bedtime )  insulin pen needle (32G X 4 MM) 32G X 4 MM miscellaneous, Use 5-6 pen needles daily or as directed.  latanoprost (XALATAN) 0.005 % ophthalmic solution, Place 1 drop into both eyes At Bedtime  magnesium oxide (MAG-OX) 400 (241.3 Mg) MG tablet, TAKE ONE TABLET BY MOUTH TWICE A DAY  neomycin-polymixin-dexamethasone (MAXITROL) 0.1 % ophthalmic suspension, Apply 1 drop to eye 4 times daily Instill into operative eye(s) per physician  instructions.  neomycin-polymixin-dexamethasone (MAXITROL) ophthalmic ointment, Place 1 Application Into the left eye At Bedtime   order for DME, Auto CPAP 8-15 cmH20  pantoprazole (PROTONIX) 40 MG EC tablet,   prednisoLONE acetate (PRED FORTE) 1 % ophthalmic suspension, Place 1-2 drops Into the left eye every other day  prednisoLONE acetate (PRED FORTE) 1 % ophthalmic suspension, Place 1-2 drops Into the left eye 2 times daily  rosuvastatin (CRESTOR) 10 MG tablet, TAKE ONE TABLET BY MOUTH ONCE DAILY  senna-docusate (SENOKOT-S/PERICOLACE) 8.6-50 MG tablet, Take 1 tablet by mouth 2 times daily as needed (hold for loose stools)  sirolimus (GENERIC EQUIVALENT) 0.5 MG tablet, Take 4 tablets (2 mg) by mouth daily  sulfamethoxazole-trimethoprim (BACTRIM) 400-80 MG tablet, Take 1 tablet by mouth three times a week  tacrolimus (GENERIC EQUIVALENT) 1 MG capsule, Take 3 capsules (3 mg) by mouth every morning AND 3 capsules (3 mg) every evening.  tamsulosin (FLOMAX) 0.4 MG capsule, Take 1 capsule (0.4 mg) by mouth daily    bevacizumab (AVASTIN) intravitreal inj 1.25 mg          ROS:  See HPI    EXAM:  /72 (BP Location: Right arm, Patient Position: Chair, Cuff Size: Adult Regular)   Pulse 104   Wt 85.7 kg (189 lb)   SpO2 97%   BMI 31.94 kg/m    GENERAL: Appears comfortable, in no acute distress.   HEENT: Eye symmetrical, no discharge or icterus bilaterally. Mucous membranes moist and without lesions. No thrush.   CV: RRR, +S1S2, no murmur, rub, or gallop. JVP 7cm.   RESPIRATORY: Respirations regular, even, and unlabored. Lungs CTA throughout.   GI: Soft and non distended with normoactive bowel sounds present in all quadrants. No tenderness, rebound, guarding. No hepatomegaly.   EXTREMITIES: 2+ peripheral edema. 2+ bilateral pedal pulses.   NEUROLOGIC: Alert and oriented x 3. No focal deficits.   MUSCULOSKELETAL: No joint swelling or tenderness.   SKIN: No jaundice. No rashes or lesions.     Labs - reviewed with  patient in clinic today:  CBC RESULTS:  Lab Results   Component Value Date    NTBNPI 8,792 (H) 09/22/2020    NTBNPI 8,357 (H) 08/24/2020    NTBNPI 8,927 (H) 07/04/2020    NTBNP 6,187 (H) 11/25/2019    NTBNP 2,036 (H) 08/26/2019    NTBNP 1,577 (H) 05/07/2019     Lab Results   Component Value Date    WBC 6.1 03/14/2022    WBC 5.2 11/17/2021    WBC 4.1 09/28/2021    HGB 11.4 (L) 03/14/2022    HGB 12.0 (L) 11/17/2021    HGB 12.4 (L) 09/28/2021    HCT 35.7 (L) 03/14/2022    HCT 37.0 (L) 11/17/2021    HCT 38.3 (L) 09/28/2021    MCV 86 03/14/2022    MCV 89 11/17/2021    MCV 93 09/28/2021     03/14/2022     11/17/2021     09/28/2021     No results found for: DD, DIMER  No results found for: SED  Lab Results   Component Value Date    AST 22 03/14/2022    AST 17 11/17/2021    AST 22 07/19/2021    ALT 20 03/14/2022    ALT 24 11/17/2021    ALT 21 07/19/2021    ALKPHOS 118 03/14/2022    ALKPHOS 120 11/17/2021    ALKPHOS 90 07/19/2021    BILITOTAL 0.4 03/14/2022    BILITOTAL 0.4 11/17/2021    BILITOTAL 0.5 07/19/2021     Lab Results   Component Value Date    TSH 0.80 08/05/2020    TSH 1.30 07/07/2020    TSH 1.25 07/04/2020       Recent Labs   Lab Test 11/17/21  0848 09/28/21  1031 07/06/16  0717 06/27/15  0755 01/11/15  1033   CHOL 127 151   < > 82 203*   HDL 35* 37*   < > 32* 34*   LDL 28 58   < > 31 Cannot estimate LDL when triglyceride exceeds 400 mg/dL  107   TRIG 322* 278*   < > 99 519*   CHOLHDLRATIO  --   --   --  2.6 6.0*    < > = values in this interval not displayed.     IMMUNOSUPPRESSANT LEVELS:  Lab Results   Component Value Date    TACROL 4.5 12/08/2021    TACROL 5.6 05/11/2021    DOSTAC  12/08/2021      Comment:      Last dose information not provided.    DOSTAC  7pm 5/10/21 05/11/2021    RAPAMY 4.8 11/17/2021       Diagnostic Studies:  Echo 3/14/2022  Interpretation Summary  Global and regional left ventricular function is normal with an EF of 60-65%.  Right ventricular function, chamber size,  wall motion, and thickness are  normal.  Pulmonary artery systolic pressure cannot be assessed.  The inferior vena cava is normal.  No pericardial effusion is present.  No significant changes noted.    RHC 9/28/2021  Mean RA: 10mmHg  RV: 35/10 mmHg  PA: 34/16 (24) mmHg  Mean WP: 12mmHg    PA sat: 70.9%  Ao sat (O2 sat): 98%  Hb: 13 g/dL  HR: 90 bpm    Jacy CO: 5 L/min  Jacy CI: 2.6 L/min/m2    TD CO: 5.2 L/min  TD CI: 2.7 L/min/m2  PVR: 2.4 PERRY  TPR: 4.8 PERRY    Coronary Angiogram 7/20/2021  Angiographically normal coronary arteries    Assessment/Plan:  Mr. Lucian Oliveira is a 66yr old male with a history of CAD (s/p 3v CABG 2008), ICM s/p OHT 7/19/20, DMII, CKD, and HTN who presents to clinic for routine follow up.  A professional  was used for this visit.     Labs today show stable electrolytes.  Creatinine stable 1.9.  Liver function and blood counts remained stable.  CMV, AlloMap, sirolimus, and tacrolimus levels are in process.     Echocardiogram from today shows stable graft function, with LVEF 60-65%, normal RV size/function, and normal valvular function.     Mr. Oliveira appears well today.  His weight trend remained stable. He has lower extremity edema, but this has been present in the setting of normal invasive hemodynamics.      He has been on sirolimus/tacrolimus now and is doing well. His renal function has stabilized. Previously we discussed discontinuing tacrolimus in favor of sirolimus/MMF. We will continue tacrolimus/sirolimus for now.      Otherwise, advised that he continue his current medications, with no changes.  We will plan for him to follow-up in clinic per protocol, or sooner should new concerns arise.    No concerns from out standpoint in undergoing eye surgery.         ICM, s/p OHT 7/19/20  His post-operative course was c/b primary graft dysfunction (LVEF 20-25% and severe RV dysfunction, thought to be secondary to prolonged ischemic time, resolved by f/up TTE 7/27/20), VT,  pericardial effusion (incidentally found per TTE 7/27, decreased size noted on TTE 7/29), donor streptococcus salivarius bacteremia (s/p 7d course of ceftriaxone), and RUE DVT c/b HIT (s/p PLEX).  He ultimately discharged 8/3/20.     Rejection history:  none  AlloMap scores:  <35  DSAs:  none  Coronary angio/Ischemic eval: Baseline coronary angiogram 7/2020 showed normal coronary arteries, with no angiographic signs of obstructive CAV.  Last RHC:  9/28/21 showed normal biventricular filling pressures, with RA 10, mPA 24, PCW 12, and CI 2.6.  Echo/cMRI: Last TTE 7/2021 showed stable graft, function, with LVEF 60-65%, normal RV size/function, and no valvular disease.     Serostatus:  - CMV D+/R+  - EBV D+/R+  - Toxo D+/R-     Immunosuppression:  - Sirolimus, goal level 3-5.  Level today in process.  - tacro, goal level 3-5.  Level today in process.     PPx:  - CAV:  Aspirin 81mg daily and rosuvastatin 10mg daily  - GI:  Pantoprazole 40mg daily  - Osteoporosis:  Calcium/vitamin D supplements  - Toxo:  Single strength bactrim, lifelong ppx     Graft function:  - BPs:  Stable, continue hydral 100mg TID  - fluid status:  Euvolemic, taking lasix 20mg daily     Health maintenance:  - derm exam - seen 2/16/2022, benign lesions  - eye exam UTD   - dental exam overdue, last exam was over 1 year ago  - last colo 2019, due 2022  - completed COVID shots, 3/3  - received flu shot   - pneumonia - received penrumovax 23 (3/2/22)  - needs shingrix        Hypertriglyceridemia  Triglycerides are 322 today.  Will review ongoing plan with Dr. Sheridan.  Again discussed the importance of heart healthy dieting and regular aerobic activity.     DMII  Follows with endo and PCP.  HgbA1c down slightly today.     HIT  HIT antibody + 7/2020.  CORRINE +.  No heparin products.     RIJ and axillary vein DVT, nonocclusive  Right cephalic vein occlusive thrombus  S/p course of anticoagulation.     Total retinal detachment of the left eye, s/p  "retinectomy 12/21/20  Proliferative diabetic retinopathy  Right vitreous hemorrhage  He presented to the ED 12/9/20 with right vision loss.  Ophthalmology was consulted, and found that he had a vitreous hemorrhage in his right eye and total retinal detachment of his left eye.  He was then seen in the eye clinic 12/10/20, where he was advised an Avastin injection in the right eye (for proliferative diabetic retinopathy and vitreous hemorrhage).  He then underwent \"27 gauge pars plana vitectomy, membrane peel, perfluoroctane liquid (PFO), retinectomy, endolaser\" 12/21/20 for the tractional retinal detachment of his left eye.  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        Patient evaluated and discussed with Dr. Sheridan.    James Duque MD   Cardiology Fellow, PGY-5  March 14, 2022  12:21 PM      I examined the patient and agree with the assessment and plan of Dr. Duque.      Total time today was 40 minutes reviewing notes, imaging, labs, patient visit, orders and documentation         Arvin Sheridan MD   [facility]  Heart Failure, Transplant, and Mechanical Circulatory Support Cardiology   Cardiovascular Division  [facility]   [phone]          Please do not hesitate to contact me if you have any questions/concerns.     Sincerely,     Arvin Sheridan MD    "

## 2022-03-14 NOTE — NURSING NOTE
Chief Complaint   Patient presents with     Follow Up     Return heart transplant     Vitals were taken and medications reconciled.    LEYLA Funez  1:01 PM

## 2022-03-14 NOTE — LETTER
Date:March 21, 2022      Patient was self referred, no letter generated. Do not send.        [facility] Health Information

## 2022-03-15 ENCOUNTER — TELEPHONE (OUTPATIENT)
Dept: TRANSPLANT | Facility: CLINIC | Age: 67
End: 2022-03-15
Payer: COMMERCIAL

## 2022-03-15 NOTE — TELEPHONE ENCOUNTER
Spoke to Lucian via . Reviewed results over phone. Hgb A1C 7.4. Lucian will see his endocrinologist 4/12/2022. Sirolimus level was 5 (goal 3-5) and FK level was 3.9 (goal 3-5). No dose changes. HCV assay undetected. Pt confirms understanding. States he is feeling well and walking in the park. Will call with questions/concerns.

## 2022-03-16 LAB
ALLOMAP SCORE (EXTERNAL): 33 (ref 0–40)
NEGATIVE PREDICTIVE VALUE PERCENT (EXTERNAL): 99.1 %
POSITIVE PREDICTIVE VALUE PERCENT (EXTERNAL): 3.8 %

## 2022-03-29 DIAGNOSIS — Z79.4 TYPE 2 DIABETES MELLITUS WITH DIABETIC NEPHROPATHY, WITH LONG-TERM CURRENT USE OF INSULIN (H): ICD-10-CM

## 2022-03-29 DIAGNOSIS — E11.21 TYPE 2 DIABETES MELLITUS WITH DIABETIC NEPHROPATHY, WITH LONG-TERM CURRENT USE OF INSULIN (H): ICD-10-CM

## 2022-03-29 RX ORDER — LANCETS
1 EACH MISCELLANEOUS 4 TIMES DAILY
Qty: 400 EACH | Refills: 8 | Status: SHIPPED | OUTPATIENT
Start: 2022-03-29

## 2022-03-29 NOTE — TELEPHONE ENCOUNTER
Last Clinic Visit: 12/21/2021  [facility] [place]  Recommended 3 month follow up, NV 4/12/22

## 2022-03-31 NOTE — Clinical Note
Patient education provided.    You were seen for an upper respiratory infection. Symptomatic therapy suggested: acetaminophen or ibuprofen for pain or fevers, Robitussin for cough, Sudafed or Mucinex for congestion. Also recommend Nyquil at night to help with sleep and congestion. Increase fluids, use vaporizer, stay in steamy bathroom tid 15 min prn severe cough, tylenol as needed, rest, avoid smoky areas. Apply facial warm packs for sinus pain or use nasal saline sprays. For sore throat can try chloroseptic spray, gargle with salt water, tea with honey.

## 2022-04-05 DIAGNOSIS — Z94.1 HEART TRANSPLANT, ORTHOTOPIC, STATUS (H): ICD-10-CM

## 2022-04-05 DIAGNOSIS — E11.65 TYPE 2 DIABETES MELLITUS WITH HYPERGLYCEMIA, WITH LONG-TERM CURRENT USE OF INSULIN (H): ICD-10-CM

## 2022-04-05 DIAGNOSIS — Z79.4 TYPE 2 DIABETES MELLITUS WITH HYPERGLYCEMIA, WITH LONG-TERM CURRENT USE OF INSULIN (H): ICD-10-CM

## 2022-04-05 DIAGNOSIS — N18.32 STAGE 3B CHRONIC KIDNEY DISEASE (H): ICD-10-CM

## 2022-04-05 NOTE — TELEPHONE ENCOUNTER
insulin isophane human (HUMULIN N PEN) 100 UNIT/ML injection        Last Written Prescription Date:  03/02/21  Last Fill Quantity: 30mL,   # refills: 11  Last Office Visit : 12/21/21  Future Office visit:  04/12/22    Routing refill request to provider for review/approval because:  Insulin - refilled per clinic

## 2022-04-05 NOTE — TELEPHONE ENCOUNTER
[facility] Call Center    Phone Message    May a detailed message be left on voicemail: yes     Reason for Call: Medication Refill Request    Has the patient contacted the pharmacy for the refill? Yes   Name of medication being requested:    dulaglutide (TRULICITY) 1.5 MG/0.5ML pen     Provider who prescribed the medication: Yoly MELENDEZ  Pharmacy: [id number] - [place], [place] - [address] AT [facility] [address]  Date medication is needed: as soon as possible per patient he only has one dose remaining          Action Taken: Message routed to:  [facility] endo    Travel Screening: Not Applicable

## 2022-04-05 NOTE — TELEPHONE ENCOUNTER
dulaglutide (TRULICITY) 1.5 MG/0.5ML pen  Last Written Prescription Date:  12/21/21  Last Fill Quantity:2ml   # refills:4  Last Office Visit : 12/21/21  Future Office visit:  4/12/22    Routed because:  Griffin Moreland ordered by other provider.

## 2022-04-06 RX ORDER — DULAGLUTIDE 1.5 MG/.5ML
1.5 INJECTION, SOLUTION SUBCUTANEOUS
Qty: 2 ML | Refills: 4 | Status: SHIPPED | OUTPATIENT
Start: 2022-04-06 | End: 2022-04-12 | Stop reason: DRUGHIGH

## 2022-04-07 DIAGNOSIS — E11.3513 TYPE 2 DIABETES MELLITUS WITH PROLIFERATIVE RETINOPATHY OF BOTH EYES AND MACULAR EDEMA, UNSPECIFIED WHETHER LONG TERM INSULIN USE (H): Primary | ICD-10-CM

## 2022-04-07 NOTE — PROGRESS NOTES
Diabetes Virtual Consult Note  Marisabel Prieto PA-C  April 12, 2022      Lucian Henderson Sr. is a 66 year old male with a past medical history including  has a past medical history of CAD (coronary artery disease), CHF (congestive heart failure) (H), CKD (chronic kidney disease), stage III (H), Cortical cataract of both eyes, Diabetes (H), Hyperlipidemia, Hypertension, Infection due to Streptococcus mitis group (09/23/2020), Ischemic cardiomyopathy, Obesity, CHANTEL (obstructive sleep apnea), CHANTEL (obstructive sleep apnea)- severe (AHI 30), and Osteoarthritis.    He has no past medical history of Chronic infection, COPD (chronic obstructive pulmonary disease) (H), Heart murmur, Irregular heart beat, Liver disease, Other chronic pain, or Uncomplicated asthma.  He is  s/p heart transplant on 7/19/2020 and left eye retinectomy on 12/21/20.    Feb 2021:  At that time BG was reasonably controlled early in the morning but with BG later in the day above goal.  I suspect some of this is due to varying NPH dose in the morning.      Advised:  NPH 35 units every morning regardless of fasting BG to prevent higher BG later and 11 units each evening.  Increase evening dose if fasting ALWAYS >105.    Continue 15 units Novolog with lunch + sliding scale insulin 2u:30 >140  With dinner give Novolog 10 u  - 200, 12 for  - 229, or 14u for BG >230.    Oct 2021:  Lucian had type 2 diabetes complicated by ICM and heart transplant, CKD, obesity, retinopathy that is reasonably controlled early in the morning but with BG later in the day remains far above goal.    Advised:  Increase NPH to 40 qam, 12 qpm.  R/B of Trulicity reviewed and will start 0.75 mg.      I last saw Lucian via video visit with his wife and December 2021.  At that time he had recently seen ophthalmology for his ongoing proliferative diabetic retinopathy, as well as cardiology who was considering decreasing his Bactrim due to rising creatinine.   Lefty Soirano was not walking due to the cold weather but continue to use a stationary bike in his home and was happy to have lost 15 pounds since starting Trulicity.  At that time his blood sugars appear to be slightly above goal and ranged from .  Lucian and Shivani had discontinued his sliding scale in any of NovoLog in the evening as they were nervous about low blood sugars overnight.  We decided to Continue  NPH to 40 qam, 12 qpm, Novolog 2:30 >140 with breakfast and lunch.  We may need to resume sliding scale insulin with dinner, but for now will trial increase in Trulicity to 1.5 mg and RTC 2-3 months.    Interim:  He has not been hospitalized.  He did see cardiology for his 20-month visit last month and they reviewed his echo together which was within normal limits.  They continued tacrolimus and Rapa immunosuppression.    He did have have Avastin at his ophthalmology visit in March and planned for PRP fill in his right eye this month.    Today:     I met with Lucian and his wife Shivani by video visit.  Visit conducted in Portuguese. I offered  both at the beginning and the end of the visit in case needed for clarification, but they declined and felt that we had understood each other throughout the visit.    Jose is feeling that his blood sugar is better controlled and better numbers.  He is now eating a bit less, but still with too much appetite.  He does feel the Trulicity helps a bit and would be happy to increase this dose.   walking 40 minutes daily either on the treadmill he has at home or in the park now that the weather is nice out.    Shivani continues to help him with his medications.  She knows they are running out of the Novolin though they have plenty of Humalog.  She continues to give Novolin 40 units in the morning and 12 at night though she states she sometimes will increase this to 20 if his blood sugar is over 200 in the evening.  She continues to give Humalog 16 to 18 units  if his blood sugar before lunch is over 200, she also will give a little bit of extra at breakfast usually 12-14 units if he wakes up with a blood sugar over 200.  She rarely gives any of this in the evening, not at dinner or bedtime for concerns of low blood sugar overnight.    Current DM Regimen:    NPH 40 u qam around 8:30 or 9 am plus sliding scale, 12 u qpm at 9 pm.  (at times will increase to 20 if BG high)   Humalog  16 - 18 units midday if BG >200 units - she also does this in the morning       Trulicity     They do not give Novolog in evening for fear of low overnight.  Last low overnight was prior to last visit in early October.      He is eating three times daily, but at times Shivani sleeps earlier and she will give the long acting before she sleeps.    They DO NOT give any Novolog with dinner.        We reviewed glucometer, pump and CGMS data together.  It revealed:    3/24/22  157  259  201    3/25/22  138  289  180    3/26/22  189  311  205    3/27/22  125  250  261    3/28/22  193  256  316    3/29/22  135  230  220    3/30/22  161  195  105    3/31/22  139  229  199    4/1/22  198  194  213    4/2/22  205  298  198    4/3/22  173  284  170    4/4/22  183  227  179    4/5/22  138  207  234    4/6/22  160  274  177    4/7/22  148  182  144    4/8/22  108          History of Diabetes monitoring and complications/ prevention:  CAD: Yes  Last eye exam results: 7/21/2021.  Did PRP an Avastatin and discussed surgical options for right eye which has hx vitreous hemorrhage.    Last dental exam: Reports UTD  Microalbuminuria: 6 October 2018  HTN: Yes  On Statin: Yes  On Aspirin: Yes  Depression: No - worried, but happy. Hopeful.    ED: yes, limited concerned at this time     Janice  has a past medical history of CAD (coronary artery disease), CHF (congestive heart failure) (H), CKD (chronic kidney disease), stage III (H), Cortical cataract of both eyes, Diabetes (H), Hyperlipidemia, Hypertension, Infection  due to Streptococcus mitis group (09/23/2020), Ischemic cardiomyopathy, Obesity, CHANTEL (obstructive sleep apnea), CHANTEL (obstructive sleep apnea)- severe (AHI 30), and Osteoarthritis.    He has no past medical history of Chronic infection, COPD (chronic obstructive pulmonary disease) (H), Heart murmur, Irregular heart beat, Liver disease, Other chronic pain, or Uncomplicated asthma.  Current Outpatient Medications   Medication     acetaminophen (TYLENOL) 325 MG tablet     Alcohol Swabs PADS     aspirin (ASA) 81 MG chewable tablet     atropine 1 % ophthalmic solution     blood glucose (NO BRAND SPECIFIED) test strip     blood glucose monitoring (ACCU-CHEK FELIPE SMARTVIEW) meter device kit     blood glucose monitoring (SOFTCLIX) lancets     Calcium Carbonate-Vitamin D3 600-400 MG-UNIT TABS     Continuous Blood Gluc Sensor (FREESTYLE JEREMY 14 DAY SENSOR) MISC     Continuous Blood Gluc Sensor (FREESTYLE JEREMY 14 DAY SENSOR) MISC     dulaglutide (TRULICITY) 1.5 MG/0.5ML pen     furosemide (LASIX) 20 MG tablet     HUMALOG KWIKPEN 100 UNIT/ML soln     hydrALAZINE (APRESOLINE) 100 MG tablet     insulin aspart (NOVOLOG PEN) 100 UNIT/ML pen     insulin isophane human (HUMULIN N PEN) 100 UNIT/ML injection     insulin  UNIT/ML injection     insulin pen needle (32G X 4 MM) 32G X 4 MM miscellaneous     latanoprost (XALATAN) 0.005 % ophthalmic solution     magnesium oxide (MAG-OX) 400 (241.3 Mg) MG tablet     neomycin-polymixin-dexamethasone (MAXITROL) 0.1 % ophthalmic suspension     neomycin-polymixin-dexamethasone (MAXITROL) ophthalmic ointment     order for DME     pantoprazole (PROTONIX) 40 MG EC tablet     prednisoLONE acetate (PRED FORTE) 1 % ophthalmic suspension     prednisoLONE acetate (PRED FORTE) 1 % ophthalmic suspension     rosuvastatin (CRESTOR) 10 MG tablet     senna-docusate (SENOKOT-S/PERICOLACE) 8.6-50 MG tablet     sirolimus (GENERIC EQUIVALENT) 0.5 MG tablet     sulfamethoxazole-trimethoprim (BACTRIM) 400-80  "MG tablet     tacrolimus (GENERIC EQUIVALENT) 1 MG capsule     tamsulosin (FLOMAX) 0.4 MG capsule     Current Facility-Administered Medications   Medication     bevacizumab (AVASTIN) intravitreal inj 1.25 mg         Lucian's family history includes Diabetes in his brother, sister, and sister.    ROS:   Patient denies any fevers, chills or sweats as well as any changes in or problems with vision, pain or problems with dentition, new or different headaches.  Patient denies symptoms of hypo and hyperglycemia except as above.   Patients denies marked fatigue, cough, shortness of breath, chest pain or pressure.  There has been no pain with or other changes in urination or  itching or pain in genital areas.  Patient denies any noted swelling in feet, ankles or otherwise, loss of sensation or pain  in feet or other areas.    Patient also denies current difficulties with depressed mood, anhedonia or worrying too much.  He is tired of COVID precautions, but feels god and optimistic.        Exam:      Wt Readings from Last 10 Encounters:   03/14/22 85.7 kg (189 lb)   03/02/22 86.7 kg (191 lb 3.2 oz)   11/17/21 86.2 kg (190 lb)   10/19/21 90.4 kg (199 lb 4.8 oz)   09/28/21 87.5 kg (193 lb)   07/20/21 84.8 kg (187 lb)   07/19/21 86.2 kg (190 lb)   05/11/21 82.5 kg (181 lb 14.4 oz)   03/02/21 81.2 kg (179 lb)   01/05/21 82.4 kg (181 lb 9.6 oz)     Video visit due to COVID precautions.      This is a warm and pleasant obese individual in no acute distress - with spouse in home.  Mood is \"good,\"  affect is appropriate,    Her eyes are clear without proptosis, with healthy appearing lens, conjunctiva and sclera.  EOMs intact.  Nares are patent.  Neck is supple without mass or JVD noted.    Respirations are easy and unlabored.  Skin is warm moist and elastic without lesions noted.         Data:      Recent Labs   Lab Test 03/14/22  1014 12/08/21  0845 11/30/21  0859 11/17/21  1126 11/17/21  0848 10/08/21  0850 09/28/21  1031 " 08/10/20  0845 08/05/20  0843 07/07/20  1732 07/07/20  0720 10/16/18  0904 10/03/18  0725 08/16/18  0834 08/06/18  0000 04/30/18  1148 04/30/18  0000   A1C 7.4*  --   --   --  7.3*   < >  --    < >  --   --   --    < >  --   --   --   --   --    HEMOGLOBINA1  --   --   --   --   --   --   --   --   --   --   --   --   --   --  8.3*  --  10.6*   TSH  --   --   --   --   --   --   --   --  0.80  --  1.30   < >  --   --   --   --   --    LDL  --   --   --   --  28  --  58   < >  --   --   --    < >  --    < >  --   --   --    HDL  --   --   --   --  35*  --  37*   < >  --   --   --    < >  --    < >  --   --   --    TRIG  --   --   --   --  322*  --  278*   < >  --   --   --    < >  --    < >  --   --   --    CR 1.94* 2.03*   < >  --  2.19*   < > 2.06*   < > 1.70*   < >  --    < >  --    < >  --    < >  --    MICROL  --   --   --  146  --   --   --   --   --   --   --   --  6  --   --   --   --     < > = values in this interval not displayed.     GFR Estimate   Date Value Ref Range Status   03/14/2022 37 (L) >60 mL/min/1.73m2 Final     Comment:     Effective December 21, 2021 eGFRcr in adults is calculated using the 2021 CKD-EPI creatinine equation which includes age and gender (Lucio hughes al., NEJM, DOI: 10.1056/TETEgu8219134)   12/08/2021 33 (L) >60 mL/min/1.73m2 Final     Comment:     As of July 11, 2021, eGFR is calculated by the CKD-EPI creatinine equation, without race adjustment. eGFR can be influenced by muscle mass, exercise, and diet. The reported eGFR is an estimation only and is only applicable if the renal function is stable.   11/30/2021 28 (L) >60 mL/min/1.73m2 Final     Comment:     As of July 11, 2021, eGFR is calculated by the CKD-EPI creatinine equation, without race adjustment. eGFR can be influenced by muscle mass, exercise, and diet. The reported eGFR is an estimation only and is only applicable if the renal function is stable.   11/17/2021 30 (L) >60 mL/min/1.73m2 Final     Comment:     As of July  11, 2021, eGFR is calculated by the CKD-EPI creatinine equation, without race adjustment. eGFR can be influenced by muscle mass, exercise, and diet. The reported eGFR is an estimation only and is only applicable if the renal function is stable.   10/15/2021 34 (L) >60 mL/min/1.73m2 Final     Comment:     As of July 11, 2021, eGFR is calculated by the CKD-EPI creatinine equation, without race adjustment. eGFR can be influenced by muscle mass, exercise, and diet. The reported eGFR is an estimation only and is only applicable if the renal function is stable.   05/11/2021 37 (L) >60 mL/min/[1.73_m2] Final     Comment:     Non  GFR Calc  Starting 12/18/2018, serum creatinine based estimated GFR (eGFR) will be   calculated using the Chronic Kidney Disease Epidemiology Collaboration   (CKD-EPI) equation.     03/02/2021 37 (L) >60 mL/min/[1.73_m2] Final     Comment:     Non  GFR Calc  Starting 12/18/2018, serum creatinine based estimated GFR (eGFR) will be   calculated using the Chronic Kidney Disease Epidemiology Collaboration   (CKD-EPI) equation.     01/14/2021 41 (L) >60 mL/min/[1.73_m2] Final     Comment:     Non  GFR Calc  Starting 12/18/2018, serum creatinine based estimated GFR (eGFR) will be   calculated using the Chronic Kidney Disease Epidemiology Collaboration   (CKD-EPI) equation.     01/05/2021 41 (L) >60 mL/min/[1.73_m2] Final     Comment:     Non  GFR Calc  Starting 12/18/2018, serum creatinine based estimated GFR (eGFR) will be   calculated using the Chronic Kidney Disease Epidemiology Collaboration   (CKD-EPI) equation.     12/09/2020 35 (L) >60 mL/min/[1.73_m2] Final     Comment:     Non  GFR Calc  Starting 12/18/2018, serum creatinine based estimated GFR (eGFR) will be   calculated using the Chronic Kidney Disease Epidemiology Collaboration   (CKD-EPI) equation.         Hemoglobin   Date Value Ref Range Status   03/14/2022  11.4 (L) 13.3 - 17.7 g/dL Final   05/11/2021 13.4 13.3 - 17.7 g/dL Final   ]        Assessment/Plan:    Lucian is a 65 year old male with  has a past medical history of CAD (coronary artery disease), CHF (congestive heart failure) (H), CKD (chronic kidney disease), stage III (H), Cortical cataract of both eyes, Diabetes (H), Hyperlipidemia, Hypertension, Infection due to Streptococcus mitis group (09/23/2020), Ischemic cardiomyopathy, Obesity, CHANTEL (obstructive sleep apnea), CHANTEL (obstructive sleep apnea)- severe (AHI 30), and Osteoarthritis.    He has no past medical history of Chronic infection, COPD (chronic obstructive pulmonary disease) (H), Heart murmur, Irregular heart beat, Liver disease, Other chronic pain, or Uncomplicated asthma.  He is  s/p heart transplant on 7/19/2020 and left eye retinectomy on 12/21/20.    Lucian has type 2 diabetes complicated by ICM and heart transplant, CKD ( - GFR 28- 34 in last 6 m) , obesity, retinopathy, and microalbuminuria that is reasonably controlled with A1C 7.4% last month though fictitiously depressed likely with lower Hgb.      Glycemic control is judged to be fair.  His weight is stable    Plan:  Continue  NPH to 40 qam, 12 qpm, Novolog 10 to 14 units with breakfast and lunch when blood sugar is higher.  We will try increasing Trulicity to 3 mg weekly and counseled both her heels and Shivani and using lower doses of NovoLog until we see what his after meal blood sugars look like with this.    Encouraged in his regular walking.  Discussed adding potentially some bicep curls and squats.    Return to clinic with video visit in 6 months sooner if there are any concerns.        It is my privilege to be involved in the care of the above patient.     Marisabel Prieto PA-C, MPAS  [facility]  Diabetes, Endocrinology, and Metabolism  [phone] Appointments/Nurse  [phone] pager  [phone] nurse line    This note was completed in part using Dragon  voice recognition, and may contain word and grammatical errors.    Video: 12:36 - 1:05  44 minutes spent on the date of the encounter doing chart review, history and exam, documentation, education and counseling, as well as communication and coordination of care, and further activities as noted above.  This time excludes time spent reviewing CGM.

## 2022-04-12 ENCOUNTER — VIRTUAL VISIT (OUTPATIENT)
Dept: ENDOCRINOLOGY | Facility: CLINIC | Age: 67
End: 2022-04-12
Payer: COMMERCIAL

## 2022-04-12 DIAGNOSIS — Z79.4 TYPE 2 DIABETES MELLITUS WITH HYPERGLYCEMIA, WITH LONG-TERM CURRENT USE OF INSULIN (H): Primary | ICD-10-CM

## 2022-04-12 DIAGNOSIS — Z94.1 HEART TRANSPLANT, ORTHOTOPIC, STATUS (H): ICD-10-CM

## 2022-04-12 DIAGNOSIS — E11.65 TYPE 2 DIABETES MELLITUS WITH HYPERGLYCEMIA, WITH LONG-TERM CURRENT USE OF INSULIN (H): Primary | ICD-10-CM

## 2022-04-12 PROCEDURE — 99207 PR NO BILLABLE SERVICE THIS VISIT: CPT | Mod: U4

## 2022-04-12 PROCEDURE — 99215 OFFICE O/P EST HI 40 MIN: CPT | Mod: GT | Performed by: PHYSICIAN ASSISTANT

## 2022-04-12 RX ORDER — DULAGLUTIDE 3 MG/.5ML
3 INJECTION, SOLUTION SUBCUTANEOUS WEEKLY
Qty: 7 ML | Refills: 1 | Status: SHIPPED | OUTPATIENT
Start: 2022-04-12 | End: 2022-05-05

## 2022-04-12 NOTE — LETTER
4/12/2022       RE: Lucian Oliveira  [address] [facility] [address]     Dear Colleague,    Thank you for referring your patient, Lucian Oliveira, to the [facility] ENDOCRINOLOGY CLINIC [place] at [facility]. Please see a copy of my visit note below.            Diabetes Virtual Consult Note  Marisabel Prieto PA-C  April 12, 2022      Lucian Oliveira Santiago Sr. is a 66 year old male with a past medical history including  has a past medical history of CAD (coronary artery disease), CHF (congestive heart failure) (H), CKD (chronic kidney disease), stage III (H), Cortical cataract of both eyes, Diabetes (H), Hyperlipidemia, Hypertension, Infection due to Streptococcus mitis group (09/23/2020), Ischemic cardiomyopathy, Obesity, CHANTEL (obstructive sleep apnea), CHANTEL (obstructive sleep apnea)- severe (AHI 30), and Osteoarthritis.    He has no past medical history of Chronic infection, COPD (chronic obstructive pulmonary disease) (H), Heart murmur, Irregular heart beat, Liver disease, Other chronic pain, or Uncomplicated asthma.  He is  s/p heart transplant on 7/19/2020 and left eye retinectomy on 12/21/20.    Feb 2021:  At that time BG was reasonably controlled early in the morning but with BG later in the day above goal.  I suspect some of this is due to varying NPH dose in the morning.      Advised:  NPH 35 units every morning regardless of fasting BG to prevent higher BG later and 11 units each evening.  Increase evening dose if fasting ALWAYS >105.    Continue 15 units Novolog with lunch + sliding scale insulin 2u:30 >140  With dinner give Novolog 10 u  - 200, 12 for  - 229, or 14u for BG >230.    Oct 2021:  Lucian had type 2 diabetes complicated by ICM and heart transplant, CKD, obesity, retinopathy that is reasonably controlled early in the morning but with BG later in the day remains far above goal.    Advised:  Increase NPH to  40 qam, 12 qpm.  R/B of Trulicity reviewed and will start 0.75 mg.      I last saw Lucian via video visit with his wife and December 2021.  At that time he had recently seen ophthalmology for his ongoing proliferative diabetic retinopathy, as well as cardiology who was considering decreasing his Bactrim due to rising creatinine.  Lefty Soriano was not walking due to the cold weather but continue to use a stationary bike in his home and was happy to have lost 15 pounds since starting Trulicity.  At that time his blood sugars appear to be slightly above goal and ranged from .  Lucian and Shivani had discontinued his sliding scale in any of NovoLog in the evening as they were nervous about low blood sugars overnight.  We decided to Continue  NPH to 40 qam, 12 qpm, Novolog 2:30 >140 with breakfast and lunch.  We may need to resume sliding scale insulin with dinner, but for now will trial increase in Trulicity to 1.5 mg and RTC 2-3 months.    Interim:  He has not been hospitalized.  He did see cardiology for his 20-month visit last month and they reviewed his echo together which was within normal limits.  They continued tacrolimus and Rapa immunosuppression.    He did have have Avastin at his ophthalmology visit in March and planned for PRP fill in his right eye this month.    Today:     I met with Lucian and his wife Shivani by video visit.  Visit conducted in Vietnamese. I offered  both at the beginning and the end of the visit in case needed for clarification, but they declined and felt that we had understood each other throughout the visit.    Jose is feeling that his blood sugar is better controlled and better numbers.  He is now eating a bit less, but still with too much appetite.  He does feel the Trulicity helps a bit and would be happy to increase this dose.   walking 40 minutes daily either on the treadmill he has at home or in the park now that the weather is nice out.    Shivani continues to  help him with his medications.  She knows they are running out of the Novolin though they have plenty of Humalog.  She continues to give Novolin 40 units in the morning and 12 at night though she states she sometimes will increase this to 20 if his blood sugar is over 200 in the evening.  She continues to give Humalog 16 to 18 units if his blood sugar before lunch is over 200, she also will give a little bit of extra at breakfast usually 12-14 units if he wakes up with a blood sugar over 200.  She rarely gives any of this in the evening, not at dinner or bedtime for concerns of low blood sugar overnight.    Current DM Regimen:    NPH 40 u qam around 8:30 or 9 am plus sliding scale, 12 u qpm at 9 pm.  (at times will increase to 20 if BG high)   Humalog  16 - 18 units midday if BG >200 units - she also does this in the morning       Trulicity     They do not give Novolog in evening for fear of low overnight.  Last low overnight was prior to last visit in early October.      He is eating three times daily, but at times Shivani sleeps earlier and she will give the long acting before she sleeps.    They DO NOT give any Novolog with dinner.        We reviewed glucometer, pump and CGMS data together.  It revealed:    3/24/22  157  259  201    3/25/22  138  289  180    3/26/22  189  311  205    3/27/22  125  250  261    3/28/22  193  256  316    3/29/22  135  230  220    3/30/22  161  195  105    3/31/22  139  229  199    4/1/22  198  194  213    4/2/22  205  298  198    4/3/22  173  284  170    4/4/22  183  227  179    4/5/22  138  207  234    4/6/22  160  274  177    4/7/22  148  182  144    4/8/22  108          History of Diabetes monitoring and complications/ prevention:  CAD: Yes  Last eye exam results: 7/21/2021.  Did PRP an Avastatin and discussed surgical options for right eye which has hx vitreous hemorrhage.    Last dental exam: Reports UTD  Microalbuminuria: 6 October 2018  HTN: Yes  On Statin: Yes  On  Aspirin: Yes  Depression: No - worried, but happy. Hopeful.    ED: yes, limited concerned at this time     Janice  has a past medical history of CAD (coronary artery disease), CHF (congestive heart failure) (H), CKD (chronic kidney disease), stage III (H), Cortical cataract of both eyes, Diabetes (H), Hyperlipidemia, Hypertension, Infection due to Streptococcus mitis group (09/23/2020), Ischemic cardiomyopathy, Obesity, CHANTEL (obstructive sleep apnea), CHANTEL (obstructive sleep apnea)- severe (AHI 30), and Osteoarthritis.    He has no past medical history of Chronic infection, COPD (chronic obstructive pulmonary disease) (H), Heart murmur, Irregular heart beat, Liver disease, Other chronic pain, or Uncomplicated asthma.  Current Outpatient Medications   Medication     acetaminophen (TYLENOL) 325 MG tablet     Alcohol Swabs PADS     aspirin (ASA) 81 MG chewable tablet     atropine 1 % ophthalmic solution     blood glucose (NO BRAND SPECIFIED) test strip     blood glucose monitoring (ACCU-CHEK FELIPE SMARTVIEW) meter device kit     blood glucose monitoring (SOFTCLIX) lancets     Calcium Carbonate-Vitamin D3 600-400 MG-UNIT TABS     Continuous Blood Gluc Sensor (FREESTYLE JEREMY 14 DAY SENSOR) MISC     Continuous Blood Gluc Sensor (FREESTYLE JEREMY 14 DAY SENSOR) MISC     dulaglutide (TRULICITY) 1.5 MG/0.5ML pen     furosemide (LASIX) 20 MG tablet     HUMALOG KWIKPEN 100 UNIT/ML soln     hydrALAZINE (APRESOLINE) 100 MG tablet     insulin aspart (NOVOLOG PEN) 100 UNIT/ML pen     insulin isophane human (HUMULIN N PEN) 100 UNIT/ML injection     insulin  UNIT/ML injection     insulin pen needle (32G X 4 MM) 32G X 4 MM miscellaneous     latanoprost (XALATAN) 0.005 % ophthalmic solution     magnesium oxide (MAG-OX) 400 (241.3 Mg) MG tablet     neomycin-polymixin-dexamethasone (MAXITROL) 0.1 % ophthalmic suspension     neomycin-polymixin-dexamethasone (MAXITROL) ophthalmic ointment     order for DME     pantoprazole  "(PROTONIX) 40 MG EC tablet     prednisoLONE acetate (PRED FORTE) 1 % ophthalmic suspension     prednisoLONE acetate (PRED FORTE) 1 % ophthalmic suspension     rosuvastatin (CRESTOR) 10 MG tablet     senna-docusate (SENOKOT-S/PERICOLACE) 8.6-50 MG tablet     sirolimus (GENERIC EQUIVALENT) 0.5 MG tablet     sulfamethoxazole-trimethoprim (BACTRIM) 400-80 MG tablet     tacrolimus (GENERIC EQUIVALENT) 1 MG capsule     tamsulosin (FLOMAX) 0.4 MG capsule     Current Facility-Administered Medications   Medication     bevacizumab (AVASTIN) intravitreal inj 1.25 mg         Lucian's family history includes Diabetes in his brother, sister, and sister.    ROS:   Patient denies any fevers, chills or sweats as well as any changes in or problems with vision, pain or problems with dentition, new or different headaches.  Patient denies symptoms of hypo and hyperglycemia except as above.   Patients denies marked fatigue, cough, shortness of breath, chest pain or pressure.  There has been no pain with or other changes in urination or  itching or pain in genital areas.  Patient denies any noted swelling in feet, ankles or otherwise, loss of sensation or pain  in feet or other areas.    Patient also denies current difficulties with depressed mood, anhedonia or worrying too much.  He is tired of COVID precautions, but feels god and optimistic.        Exam:      Wt Readings from Last 10 Encounters:   03/14/22 85.7 kg (189 lb)   03/02/22 86.7 kg (191 lb 3.2 oz)   11/17/21 86.2 kg (190 lb)   10/19/21 90.4 kg (199 lb 4.8 oz)   09/28/21 87.5 kg (193 lb)   07/20/21 84.8 kg (187 lb)   07/19/21 86.2 kg (190 lb)   05/11/21 82.5 kg (181 lb 14.4 oz)   03/02/21 81.2 kg (179 lb)   01/05/21 82.4 kg (181 lb 9.6 oz)     Video visit due to COVID precautions.      This is a warm and pleasant obese individual in no acute distress - with spouse in home.  Mood is \"good,\"  affect is appropriate,    Her eyes are clear without proptosis, with healthy appearing " lens, conjunctiva and sclera.  EOMs intact.  Nares are patent.  Neck is supple without mass or JVD noted.    Respirations are easy and unlabored.  Skin is warm moist and elastic without lesions noted.         Data:      Recent Labs   Lab Test 03/14/22  1014 12/08/21  0845 11/30/21  0859 11/17/21  1126 11/17/21  0848 10/08/21  0850 09/28/21  1031 08/10/20  0845 08/05/20  0843 07/07/20  1732 07/07/20  0720 10/16/18  0904 10/03/18  0725 08/16/18  0834 08/06/18  0000 04/30/18  1148 04/30/18  0000   A1C 7.4*  --   --   --  7.3*   < >  --    < >  --   --   --    < >  --   --   --   --   --    HEMOGLOBINA1  --   --   --   --   --   --   --   --   --   --   --   --   --   --  8.3*  --  10.6*   TSH  --   --   --   --   --   --   --   --  0.80  --  1.30   < >  --   --   --   --   --    LDL  --   --   --   --  28  --  58   < >  --   --   --    < >  --    < >  --   --   --    HDL  --   --   --   --  35*  --  37*   < >  --   --   --    < >  --    < >  --   --   --    TRIG  --   --   --   --  322*  --  278*   < >  --   --   --    < >  --    < >  --   --   --    CR 1.94* 2.03*   < >  --  2.19*   < > 2.06*   < > 1.70*   < >  --    < >  --    < >  --    < >  --    MICROL  --   --   --  146  --   --   --   --   --   --   --   --  6  --   --   --   --     < > = values in this interval not displayed.     GFR Estimate   Date Value Ref Range Status   03/14/2022 37 (L) >60 mL/min/1.73m2 Final     Comment:     Effective December 21, 2021 eGFRcr in adults is calculated using the 2021 CKD-EPI creatinine equation which includes age and gender (Lucio et al., NEJM, DOI: 10.1056/PDZHmw7593107)   12/08/2021 33 (L) >60 mL/min/1.73m2 Final     Comment:     As of July 11, 2021, eGFR is calculated by the CKD-EPI creatinine equation, without race adjustment. eGFR can be influenced by muscle mass, exercise, and diet. The reported eGFR is an estimation only and is only applicable if the renal function is stable.   11/30/2021 28 (L) >60 mL/min/1.73m2  Final     Comment:     As of July 11, 2021, eGFR is calculated by the CKD-EPI creatinine equation, without race adjustment. eGFR can be influenced by muscle mass, exercise, and diet. The reported eGFR is an estimation only and is only applicable if the renal function is stable.   11/17/2021 30 (L) >60 mL/min/1.73m2 Final     Comment:     As of July 11, 2021, eGFR is calculated by the CKD-EPI creatinine equation, without race adjustment. eGFR can be influenced by muscle mass, exercise, and diet. The reported eGFR is an estimation only and is only applicable if the renal function is stable.   10/15/2021 34 (L) >60 mL/min/1.73m2 Final     Comment:     As of July 11, 2021, eGFR is calculated by the CKD-EPI creatinine equation, without race adjustment. eGFR can be influenced by muscle mass, exercise, and diet. The reported eGFR is an estimation only and is only applicable if the renal function is stable.   05/11/2021 37 (L) >60 mL/min/[1.73_m2] Final     Comment:     Non  GFR Calc  Starting 12/18/2018, serum creatinine based estimated GFR (eGFR) will be   calculated using the Chronic Kidney Disease Epidemiology Collaboration   (CKD-EPI) equation.     03/02/2021 37 (L) >60 mL/min/[1.73_m2] Final     Comment:     Non  GFR Calc  Starting 12/18/2018, serum creatinine based estimated GFR (eGFR) will be   calculated using the Chronic Kidney Disease Epidemiology Collaboration   (CKD-EPI) equation.     01/14/2021 41 (L) >60 mL/min/[1.73_m2] Final     Comment:     Non  GFR Calc  Starting 12/18/2018, serum creatinine based estimated GFR (eGFR) will be   calculated using the Chronic Kidney Disease Epidemiology Collaboration   (CKD-EPI) equation.     01/05/2021 41 (L) >60 mL/min/[1.73_m2] Final     Comment:     Non  GFR Calc  Starting 12/18/2018, serum creatinine based estimated GFR (eGFR) will be   calculated using the Chronic Kidney Disease Epidemiology Collaboration    (CKD-EPI) equation.     12/09/2020 35 (L) >60 mL/min/[1.73_m2] Final     Comment:     Non  GFR Calc  Starting 12/18/2018, serum creatinine based estimated GFR (eGFR) will be   calculated using the Chronic Kidney Disease Epidemiology Collaboration   (CKD-EPI) equation.         Hemoglobin   Date Value Ref Range Status   03/14/2022 11.4 (L) 13.3 - 17.7 g/dL Final   05/11/2021 13.4 13.3 - 17.7 g/dL Final   ]        Assessment/Plan:    Lucian is a 65 year old male with  has a past medical history of CAD (coronary artery disease), CHF (congestive heart failure) (H), CKD (chronic kidney disease), stage III (H), Cortical cataract of both eyes, Diabetes (H), Hyperlipidemia, Hypertension, Infection due to Streptococcus mitis group (09/23/2020), Ischemic cardiomyopathy, Obesity, CHANTEL (obstructive sleep apnea), CHANTEL (obstructive sleep apnea)- severe (AHI 30), and Osteoarthritis.    He has no past medical history of Chronic infection, COPD (chronic obstructive pulmonary disease) (H), Heart murmur, Irregular heart beat, Liver disease, Other chronic pain, or Uncomplicated asthma.  He is  s/p heart transplant on 7/19/2020 and left eye retinectomy on 12/21/20.    Lucian has type 2 diabetes complicated by ICM and heart transplant, CKD ( - GFR 28- 34 in last 6 m) , obesity, retinopathy, and microalbuminuria that is reasonably controlled with A1C 7.4% last month though fictitiously depressed likely with lower Hgb.      Glycemic control is judged to be fair.  His weight is stable    Plan:  Continue  NPH to 40 qam, 12 qpm, Novolog 10 to 14 units with breakfast and lunch when blood sugar is higher.  We will try increasing Trulicity to 3 mg weekly and counseled both her heels and Shivani and using lower doses of NovoLog until we see what his after meal blood sugars look like with this.    Encouraged in his regular walking.  Discussed adding potentially some bicep curls and squats.    Return to clinic with video visit in 6  months sooner if there are any concerns.        It is my privilege to be involved in the care of the above patient.     Marisabel Prieto PA-C, MPAS  [facility]  Diabetes, Endocrinology, and Metabolism  [phone] Appointments/Nurse  [phone] pager  [phone] nurse line    This note was completed in part using Dragon voice recognition, and may contain word and grammatical errors.    Video: 12:36 - 1:05  44 minutes spent on the date of the encounter doing chart review, history and exam, documentation, education and counseling, as well as communication and coordination of care, and further activities as noted above.  This time excludes time spent reviewing CGM.                    Lucian Oliveira  is being evaluated via a billable video visit.      How would you like to obtain your AVS? MyChart  For the video visit, send the invitation by: Text to cell phone: [phone]  Will anyone else be joining your video visit? No            Again, thank you for allowing me to participate in the care of your patient.      Sincerely,    Marisabel Prieto PA-C

## 2022-04-12 NOTE — PATIENT INSTRUCTIONS
Estimado Lucian y Shivani,  Por favor sube Trulicity a 3mg cada semana.  Cuidado con Humalog.  Baje a solo 10 - 12 unidades hasta caesar halie le va con mas Trulicity.  My best wishes,    Marisabel Prieto PA-C, MPAS  [facility]  Diabetes, Endocrinology, and Metabolism  [phone] Appointments/Nurse  [phone] Fax  [phone] URGENTafter hours/weekend Endocrinologist on call

## 2022-04-12 NOTE — PROGRESS NOTES
Lucian Oliveira  is being evaluated via a billable video visit.      How would you like to obtain your AVS? NICO  For the video visit, send the invitation by: Text to cell phone: [phone]  Will anyone else be joining your video visit? No

## 2022-04-12 NOTE — LETTER
Date:April 12, 2022      Provider requested that no letter be sent. Do not send.       [facility]

## 2022-04-21 ENCOUNTER — OFFICE VISIT (OUTPATIENT)
Dept: OPHTHALMOLOGY | Facility: CLINIC | Age: 67
End: 2022-04-21
Attending: OPHTHALMOLOGY
Payer: COMMERCIAL

## 2022-04-21 DIAGNOSIS — E11.3513 TYPE 2 DIABETES MELLITUS WITH PROLIFERATIVE RETINOPATHY OF BOTH EYES AND MACULAR EDEMA, UNSPECIFIED WHETHER LONG TERM INSULIN USE (H): ICD-10-CM

## 2022-04-21 PROCEDURE — G0463 HOSPITAL OUTPT CLINIC VISIT: HCPCS | Mod: 25

## 2022-04-21 PROCEDURE — 67028 INJECTION EYE DRUG: CPT | Mod: RT | Performed by: OPHTHALMOLOGY

## 2022-04-21 PROCEDURE — 92134 CPTRZ OPH DX IMG PST SGM RTA: CPT | Performed by: OPHTHALMOLOGY

## 2022-04-21 PROCEDURE — 250N000011 HC RX IP 250 OP 636: Performed by: OPHTHALMOLOGY

## 2022-04-21 RX ADMIN — Medication 1.25 MG: at 09:48

## 2022-04-21 ASSESSMENT — TONOMETRY
IOP_METHOD: ICARE
OD_IOP_MMHG: 18
OS_IOP_MMHG: 23

## 2022-04-21 ASSESSMENT — VISUAL ACUITY
METHOD: SNELLEN - LINEAR
OD_SC: 20/50
OD_SC+: -1
OS_SC: CF @ 1'

## 2022-04-21 ASSESSMENT — CONF VISUAL FIELD
OS_INFERIOR_NASAL_RESTRICTION: 1
OS_SUPERIOR_TEMPORAL_RESTRICTION: 1
OS_INFERIOR_TEMPORAL_RESTRICTION: 1
OD_SUPERIOR_TEMPORAL_RESTRICTION: 3
OD_INFERIOR_TEMPORAL_RESTRICTION: 3
OD_INFERIOR_NASAL_RESTRICTION: 3
OD_SUPERIOR_NASAL_RESTRICTION: 3
OS_SUPERIOR_NASAL_RESTRICTION: 1

## 2022-04-21 ASSESSMENT — SLIT LAMP EXAM - LIDS
COMMENTS: NORMAL
COMMENTS: NORMAL

## 2022-04-21 ASSESSMENT — CUP TO DISC RATIO: OD_RATIO: 0.25

## 2022-04-21 ASSESSMENT — EXTERNAL EXAM - RIGHT EYE: OD_EXAM: NORMAL

## 2022-04-21 ASSESSMENT — EXTERNAL EXAM - LEFT EYE: OS_EXAM: NORMAL

## 2022-04-21 NOTE — PROGRESS NOTES
CC: PDR follow up    Interval Hx: Patient reports stable vision since last visit.     Taking latanoprost at bedtime right eye and Pred forte every other day left eye.     HPI: 66 year old male with DM and advanced PDR each eye. H/o TRD w/ funnel RD s/p surgery 2020 w/ limited visual recovery.    RETINAL IMAGING    Optical Coherence Tomography: 04/21/22  Right eye: interval increase in central IRF, worse today   Left eye: Irregular retina; ERM; nasal to fovea with large bullous edema, temporal with subretinal fibrosis / trace edema, Atrophic thinned retina IT macula. = stable     FA 1/19/22  right eye: PRP scars. Foci of NV superonasal.     Plan:     # Proliferative Diabetic Retinopathy, each eye   Blood pressure (<120/80) and blood glucose (HbA1c <7.0) control discussed with patient. Patient advised that failure to adequately control each may lead to vision loss. The patient expressed understanding.      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 Denies eye pain.   - guarded prognosis discussed   - Continue PF every other day left eye    # Ocuar HTN    - Continue Latanoprost hs, recommended both eyes    - IOP wnl     # Visually significant cataract right eye    - IOL calcs obtained and A-scan completed 3/10/2022   - improve CME prior to CEIOL    - Possible plan for CEIOL right eye --> to discuss at next visit (~5/2022)  Visually significant:YES  Glare: Positive   Reports impacts ADL   Dilation:Good  Iris expansion: possible; pupil dilated to 7 mm  Pseudoexfoliation: NO  Trypan Blue:  possible  Phacodonesis: No  Cornea guttae: rare  Aim for: -0.5  Start artificial tears twice a day and as needed    Anesthesia: peribulbar block  Surgical time: 30 min   Candidate for multifocal: NO  Candidate for toric IOL: NO  Anticoagulants: aspirin  Alpha blockers/Flomax: NO  Monocular patient   I reviewed the indications, risks, benefits, and alternatives of the proposed surgical procedure. We discussed at length cataracts and the effect of the cataracts on vision.   We discussed the fact that modern cataract surgery is usually successful at alleviating symptoms of glare when the cataract is the causative factor. Other risks were discussed with patient including, but not limited to, failure to improve vision or further loss of vision,  and need for additional surgery, bleeding, infection, loss of vision and the remote possibility of complications of anesthesia. 1:1000 risk of infection/bleed/loss of eye; 1:100 risk of RD and need for further surgery. Patient agreed to proceed with surgery.  I provided multiple opportunities for the questions, answered all questions to the best of my ability, and confirmed that my answers and my discussion were understood.     Plan Avastin inj today OD  Follow up in 4 weeks for V/T/Dilate each eye, OCT Mac,   Patient considering surgery this spring/summer    Tacos Caldera MD  Retina Fellow, PGY5    ~~~~~~~~~~~~~~~~~~~~~~~~~~~~~~~~~~   Complete documentation of historical and exam elements from today's encounter can be found in the full encounter summary report (not reduplicated in this progress note).  I personally obtained the chief complaint(s) and history of present illness.  I confirmed and edited as necessary the review of systems, past medical/surgical history, family history, social history, and examination findings as documented by others; and I examined the patient myself.  I personally reviewed the relevant tests, images, and reports as documented above.  I  formulated and edited as necessary the assessment and plan and discussed the findings and management plan with the patient and family and I was present for critical portions of the procedure carried out by the resident/fellow and immediately available for the entire procedure    Triny Bunch MD   of Ophthalmology.  Retina Service   Department of Ophthalmology and Visual Neurosciences   [facility]  Phone: [phone]   Fax: [phone]

## 2022-04-21 NOTE — NURSING NOTE
Chief Complaints and History of Present Illnesses   Patient presents with     Follow Up     Chief Complaint(s) and History of Present Illness(es)     Follow Up               Comments     Pt states that his vision remains the same and he denies other concerns or changes.    DM2  BGL: 142, this morning  Lab Results       Component                Value               Date                       A1C                      7.4                 03/14/2022                 A1C                      7.3                 11/17/2021                 A1C                      7.8                 10/15/2021                 A1C                      7.3                 07/19/2021                 A1C                      6.7                 01/14/2021                 A1C                      5.9                 12/07/2020                 A1C                      8.2                 07/03/2020                 A1C                      7.9                 07/08/2019                 A1C                      8.5                 03/11/2019              Mario Oliveira OT 9:00 AM April 21, 2022

## 2022-05-02 DIAGNOSIS — E11.65 TYPE 2 DIABETES MELLITUS WITH HYPERGLYCEMIA, WITH LONG-TERM CURRENT USE OF INSULIN (H): ICD-10-CM

## 2022-05-02 DIAGNOSIS — Z79.4 TYPE 2 DIABETES MELLITUS WITH HYPERGLYCEMIA, WITH LONG-TERM CURRENT USE OF INSULIN (H): ICD-10-CM

## 2022-05-05 RX ORDER — DULAGLUTIDE 3 MG/.5ML
3 INJECTION, SOLUTION SUBCUTANEOUS WEEKLY
Qty: 7 ML | Refills: 3 | Status: SHIPPED | OUTPATIENT
Start: 2022-05-05 | End: 2022-12-05

## 2022-05-05 NOTE — TELEPHONE ENCOUNTER
Dulaglutide (TRULICITY) 3 MG/0.5ML SOPN  Last Written Prescription Date:  4/12/2022  Last Fill Quantity: 7,   # refills: 1  Last Office Visit :  4/12/2022  Future Office visit:   10/25/2022    Routing refill request to provider for review/approval because:  Abnormal Creatinine  Refer to clinic for review     Recent Labs   Lab Test 03/14/22  1014 09/23/20  0710 09/22/20  1313   CR 1.94*   < >  --    CREAT  --   --  1.5*       Claudia Edmondson RN  Central Triage Red Flags/Med Refills

## 2022-05-06 ENCOUNTER — APPOINTMENT (OUTPATIENT)
Dept: INTERPRETER SERVICES | Facility: CLINIC | Age: 67
End: 2022-05-06
Payer: COMMERCIAL

## 2022-05-10 DIAGNOSIS — Z94.1 HEART REPLACED BY TRANSPLANT (H): Primary | ICD-10-CM

## 2022-05-12 ENCOUNTER — TELEPHONE (OUTPATIENT)
Dept: TRANSPLANT | Facility: CLINIC | Age: 67
End: 2022-05-12
Payer: COMMERCIAL

## 2022-05-12 ENCOUNTER — APPOINTMENT (OUTPATIENT)
Dept: INTERPRETER SERVICES | Facility: CLINIC | Age: 67
End: 2022-05-12
Payer: COMMERCIAL

## 2022-05-12 DIAGNOSIS — E11.21 TYPE 2 DIABETES MELLITUS WITH DIABETIC NEPHROPATHY, WITH LONG-TERM CURRENT USE OF INSULIN (H): ICD-10-CM

## 2022-05-12 DIAGNOSIS — Z79.4 TYPE 2 DIABETES MELLITUS WITH DIABETIC NEPHROPATHY, WITH LONG-TERM CURRENT USE OF INSULIN (H): ICD-10-CM

## 2022-05-12 NOTE — TELEPHONE ENCOUNTER
Called patient with [employer] interpretor to confirm post heart txp annual appts on 7/20 and cMRI on 8/9. [place] appt times. Pt has mychart but is unsure how to use it. Reassured patient that letter will be sent with appt dates/times and prep instructions. Patient verbalized understanding.

## 2022-05-13 ENCOUNTER — DOCUMENTATION ONLY (OUTPATIENT)
Dept: TRANSPLANT | Facility: CLINIC | Age: 67
End: 2022-05-13
Payer: COMMERCIAL

## 2022-05-13 NOTE — TELEPHONE ENCOUNTER
Test strips    4/12/2022  [facility] [place]   Multiple Providers    Endocrinology, Diabetes, and Metabolism

## 2022-05-13 NOTE — PROGRESS NOTES
Tried to send appointment schedule in [place] but could not find any resources to do this.  Language services has a tool for this but it has to be a Word doc only and not a PDF.  I emailed his appointments in English.

## 2022-05-16 NOTE — TELEPHONE ENCOUNTER
"Requested Prescriptions   Pending Prescriptions Disp Refills     atorvastatin (LIPITOR) 40 MG tablet [Pharmacy Med Name: ATORVASTATIN CALCIUM 40MG TABS] 90 tablet 0    Last Written Prescription Date:  5/1/18  Last Fill Quantity: 90,  # refills: 0   Last office visit: 9/27/2017 with prescribing provider:  9/27/17 ?    Future Office Visit:   Next 5 appointments (look out 90 days)     Aug 23, 2018 11:30 AM CDT   (Arrive by 11:15 AM)   Office Visit with AURORA Barfield [facility])    [address] [place] [address] [id number] [phone]                Sig: TAKE ONE TABLET BY MOUTH EVERY DAY    Statins Protocol Failed    8/6/2018  1:14 PM       Failed - LDL on file in past 12 months    Recent Labs   Lab Test  07/05/17   0732   LDL  22            Passed - No abnormal creatine kinase in past 12 months    No lab results found.            Passed - Recent (12 mo) or future (30 days) visit within the authorizing provider's specialty    Patient had office visit in the last 12 months or has a visit in the next 30 days with authorizing provider or within the authorizing provider's specialty.  See \"Patient Info\" tab in inbasket, or \"Choose Columns\" in Meds & Orders section of the refill encounter.           Passed - Patient is age 18 or older        LANTUS SOLOSTAR 100 UNIT/ML soln [Pharmacy Med Name: LANTUS SOLOSTAR 100UNIT/ML SOPN] 15 mL 0    Last Written Prescription Date:  5/1/18  Last Fill Quantity: 15 ml,  # refills: 0   Last office visit: 9/27/2017 with prescribing provider:  9/27/17 ?    Future Office Visit:   Next 5 appointments (look out 90 days)     Aug 23, 2018 11:30 AM CDT   (Arrive by 11:15 AM)   Office Visit with AURORA [facility]field [facility])    [address] [place] [address] [id number] [phone]                Sig: INJECT 40 UNITS UNDER THE SKIN AT BEDTIME (PLEASE MAKE " "APPOINTMENT FOR FURTHER REFILLS)    Long Acting Insulin Protocol Failed    8/6/2018  1:14 PM       Failed - LDL on file in past 12 months    Recent Labs   Lab Test  07/05/17   0732   LDL  22            Failed - Microalbumin on file in past 12 months    Recent Labs   Lab Test  04/27/17   0745   MICROL  44   UMALCR  39.38*            Failed - Recent (6 mo) or future (30 days) visit within the authorizing provider's specialty    Patient had office visit in the last 6 months or has a visit in the next 30 days with authorizing provider or within the authorizing provider's specialty.  See \"Patient Info\" tab in inbasket, or \"Choose Columns\" in Meds & Orders section of the refill encounter.           Passed - Blood pressure less than 140/90 in past 6 months    BP Readings from Last 3 Encounters:   08/06/18 110/71   04/30/18 105/65   04/30/18 116/74                Passed - Serum creatinine on file in past 12 months    Recent Labs   Lab Test  05/04/18   0740   CR  1.96*            Passed - HgbA1C in past 3 or 6 months    If HgbA1C is 8 or greater, it needs to be on file within the past 3 months.  If less than 8, must be on file within the past 6 months.     Recent Labs   Lab Test 08/06/18 09/06/17   0845   A1C   --    --   9.8*   HEMOGLOBINA1  8.3*   < >   --     < > = values in this interval not displayed.            Passed - Patient is age 18 or older        isosorbide mononitrate (IMDUR) 30 MG 24 hr tablet [Pharmacy Med Name: ISOSORBIDE MONONITRATE ER 30 TB24] 135 tablet 3    Last Written Prescription Date:  5/1/18  Last Fill Quantity: 135,  # refills: 3   Last office visit: 9/27/2017 with prescribing provider:  9/27/17 ?    Future Office Visit:   Next 5 appointments (look out 90 days)     Aug 23, 2018 11:30 AM CDT   (Arrive by 11:15 AM)   Office Visit with Suyapa Dias RN   [facility])    [address] [place] [id number] [phone]         " "       Sig: TAKE ONE AND ONE-HALF TABLETS BY MOUTH ONCE DAILY    Nitrates Passed    8/6/2018  1:14 PM       Passed - Blood pressure under 140/90 in past 12 months    BP Readings from Last 3 Encounters:   08/06/18 110/71   04/30/18 105/65   04/30/18 116/74                Passed - Pt is not on erectile dysfunction medications       Passed - Recent (12 mo) or future (30 days) visit within the authorizing provider's specialty    Patient had office visit in the last 12 months or has a visit in the next 30 days with authorizing provider or within the authorizing provider's specialty.  See \"Patient Info\" tab in inbasket, or \"Choose Columns\" in Meds & Orders section of the refill encounter.           Passed - Patient is age 18 or older        " Bcc  Morpheaform/Sclerosing Histology Text: There were atypical basaloid cells with peripheral palisading and clefting in a pink scar-like stroma.

## 2022-05-25 DIAGNOSIS — E11.3513 TYPE 2 DIABETES MELLITUS WITH PROLIFERATIVE RETINOPATHY OF BOTH EYES AND MACULAR EDEMA, UNSPECIFIED WHETHER LONG TERM INSULIN USE (H): Primary | ICD-10-CM

## 2022-06-02 ENCOUNTER — OFFICE VISIT (OUTPATIENT)
Dept: OPHTHALMOLOGY | Facility: CLINIC | Age: 67
End: 2022-06-02
Attending: OPHTHALMOLOGY
Payer: COMMERCIAL

## 2022-06-02 DIAGNOSIS — H33.42 TRACTION DETACHMENT OF LEFT RETINA: ICD-10-CM

## 2022-06-02 DIAGNOSIS — E11.3513 TYPE 2 DIABETES MELLITUS WITH PROLIFERATIVE RETINOPATHY OF BOTH EYES AND MACULAR EDEMA, UNSPECIFIED WHETHER LONG TERM INSULIN USE (H): ICD-10-CM

## 2022-06-02 PROCEDURE — 67028 INJECTION EYE DRUG: CPT | Mod: 50 | Performed by: STUDENT IN AN ORGANIZED HEALTH CARE EDUCATION/TRAINING PROGRAM

## 2022-06-02 PROCEDURE — 92134 CPTRZ OPH DX IMG PST SGM RTA: CPT | Mod: 26 | Performed by: STUDENT IN AN ORGANIZED HEALTH CARE EDUCATION/TRAINING PROGRAM

## 2022-06-02 PROCEDURE — G0463 HOSPITAL OUTPT CLINIC VISIT: HCPCS | Mod: 25

## 2022-06-02 PROCEDURE — C9257 BEVACIZUMAB INJECTION: HCPCS | Performed by: OPHTHALMOLOGY

## 2022-06-02 PROCEDURE — 250N000011 HC RX IP 250 OP 636: Performed by: OPHTHALMOLOGY

## 2022-06-02 PROCEDURE — 67028 INJECTION EYE DRUG: CPT | Mod: RT | Performed by: OPHTHALMOLOGY

## 2022-06-02 PROCEDURE — 67028 INJECTION EYE DRUG: CPT | Mod: 50 | Performed by: OPHTHALMOLOGY

## 2022-06-02 PROCEDURE — 92134 CPTRZ OPH DX IMG PST SGM RTA: CPT | Performed by: OPHTHALMOLOGY

## 2022-06-02 RX ORDER — PREDNISOLONE ACETATE 10 MG/ML
1 SUSPENSION/ DROPS OPHTHALMIC DAILY
Qty: 10 ML | Refills: 2 | Status: SHIPPED | OUTPATIENT
Start: 2022-06-02 | End: 2023-10-09 | Stop reason: DRUGHIGH

## 2022-06-02 RX ADMIN — Medication 1.25 MG: at 09:47

## 2022-06-02 RX ADMIN — Medication 1.25 MG: at 09:46

## 2022-06-02 ASSESSMENT — SLIT LAMP EXAM - LIDS
COMMENTS: NORMAL
COMMENTS: NORMAL

## 2022-06-02 ASSESSMENT — CONF VISUAL FIELD
OS_INFERIOR_NASAL_RESTRICTION: 1
OS_INFERIOR_TEMPORAL_RESTRICTION: 1
OS_SUPERIOR_TEMPORAL_RESTRICTION: 1
OD_INFERIOR_NASAL_RESTRICTION: 3
OD_SUPERIOR_TEMPORAL_RESTRICTION: 3
OD_INFERIOR_TEMPORAL_RESTRICTION: 3
OD_SUPERIOR_NASAL_RESTRICTION: 3
OS_SUPERIOR_NASAL_RESTRICTION: 1

## 2022-06-02 ASSESSMENT — TONOMETRY
IOP_METHOD: TONOPEN
OS_IOP_MMHG: 16
OD_IOP_MMHG: 18

## 2022-06-02 ASSESSMENT — VISUAL ACUITY
OS_SC: CF@1FT
OD_SC+: -2+1
OD_SC: 20/50
METHOD: SNELLEN - LINEAR

## 2022-06-02 ASSESSMENT — EXTERNAL EXAM - RIGHT EYE: OD_EXAM: NORMAL

## 2022-06-02 ASSESSMENT — CUP TO DISC RATIO: OD_RATIO: 0.25

## 2022-06-02 ASSESSMENT — EXTERNAL EXAM - LEFT EYE: OS_EXAM: NORMAL

## 2022-06-02 NOTE — NURSING NOTE
Chief Complaints and History of Present Illnesses   Patient presents with     Diabetic Retinopathy Follow Up     Pt here for 6 week follow up on 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.     Chief Complaint(s) and History of Present Illness(es)     Diabetic Retinopathy Follow Up     Laterality: both eyes    Associated symptoms: Negative for eye pain, headache, flashes and floaters    Comments: Pt here for 6 week follow up on 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.              Comments     Pt last injection of Avastin right eye 04/21/2022. Vision is stable since last exam. No new concerns.   Lab Results       Component                Value               Date                       A1C                      7.4                 03/14/2022                 A1C                      7.3                 11/17/2021                 A1C                      7.8                 10/15/2021                 A1C                      7.3                 07/19/2021                 A1C                      6.7                 01/14/2021                 A1C                      5.9                 12/07/2020                 A1C                      8.2                 07/03/2020                 A1C                      7.9                 07/08/2019                 A1C                      8.5                 03/11/2019        NHUNG DOVER, COA 8:38 AM June 2, 2022

## 2022-06-02 NOTE — PROGRESS NOTES
CC: Follow up proliferative diabetic retinopathy and slowly clearing vitreous hemorrhage right eye   Left eye status post PPV/MP/retinectomy 12/21/20. 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 Denies eye pain.    Interval Hx: Patient reports stable vision since last visit.     Taking latanoprost at bedtime right eye and Pred forte every other day left eye.     RETINAL IMAGING  Optical Coherence Tomography: 6/2/22  Right eye: interval resolution in central IR  Left eye: Irregular retina; ERM; nasal to fovea with large bullous edema, temporal with subretinal fibrosis / trace edema, Atrophic thinned retina IT macula. = stable     FA 1/19/22  right eye: PRP scars. Foci of NV superonasal.     Plan:   # Proliferative Diabetic Retinopathy, right eye    # Vitreous hemorrhage, right eye -  Resolving (onset 4/2021)   - last Avastin 4/21/22 (6 week interval)      Plan today: avastin right eye today,    Will need PRP fill in right eye     ## Proliferative Diabetic Retinopathy, lefteye     - New NVI, left eye 6/2/2022   - gonio 6/2/2022 broad PAS superiorly ~3 clock hours, narrow PAS inferiorly <1 clock hour, no NVA   - IOP ok 16   - s/p PRP 6/5/2019   - s/p avastin last injection 11/27/2019   - Recommend avastin left eye today 6/2/2022     # Funnel RD left eye   - progressed to funnel RD after loss to follow up given heart surgeries     - w retina folded on itself under SO   - guarded prognosis discussed   - Continue PF every other day left eye    # Ocuar HTN    - Continue Latanoprost hs, recommended both eyes    - IOP wnl     # Visually significant cataract right eye    - IOL calcs obtained and A-scan completed 3/10/2022   - improve CME prior to CEIOL    - Possible plan for CEIOL right eye  in the spring    Visually significant:YES  Glare: Positive   Reports impacts ADL   Dilation:Good  Iris expansion: possible; pupil dilated to 7 mm  Pseudoexfoliation: NO  Trypan Blue: possible  Phacodonesis: No  Cornea guttae:  rare  Aim for: -0.5  Start artificial tears twice a day and as needed    Anesthesia: peribulbar block  Surgical time: 30 min   Candidate for multifocal: NO  Candidate for toric IOL: NO  Anticoagulants: aspirin  Alpha blockers/Flomax: NO  Monocular patient     I reviewed the indications, risks, benefits, and alternatives of the proposed surgical procedure. We discussed at length cataracts and the effect of the cataracts on vision.   We discussed the fact that modern cataract surgery is usually successful at alleviating symptoms of glare when the cataract is the causative factor. Other risks were discussed with patient including, but not limited to, failure to improve vision or further loss of vision,  and need for additional surgery, bleeding, infection, loss of vision and the remote possibility of complications of anesthesia. 1:1000 risk of infection/bleed/loss of eye; 1:100 risk of RD and need for further surgery. Patient agreed to proceed with surgery.  I provided multiple opportunities for the questions, answered all questions to the best of my ability, and confirmed that my answers and my discussion were understood.       Plan Avastin inj today each eye 6/2/22  RTC 6 weeks possible avastin both eyes; possible Panretinal laser photocoagulation (PRP) filling left eye, check on NVI left eye     Fatou Solis MD  Ophthalmology Resident, PGY-3  [facility]       ~~~~~~~~~~~~~~~~~~~~~~~~~~~~~~~~~~   Complete documentation of historical and exam elements from today's encounter can be found in the full encounter summary report (not reduplicated in this progress note).  I personally obtained the chief complaint(s) and history of present illness.  I confirmed and edited as necessary the review of systems, past medical/surgical history, family history, social history, and examination findings as documented by others; and I examined the patient myself.  I personally reviewed the relevant tests, images, and  reports as documented above.  I personally reviewed the ophthalmic test(s) associated with this encounter, agree with the interpretation(s) as documented by the resident/fellow, and have edited the corresponding report(s) as necessary.   I formulated and edited as necessary the assessment and plan and discussed the findings and management plan with the patient and family and I was present for critical portions of the procedure carried out by the resident/fellow and immediately available for the entire procedure    Triny Bunch MD   of Ophthalmology.  Retina Service   Department of Ophthalmology and Visual Neurosciences   [facility]  Phone: [phone]   Fax: [phone]

## 2022-07-15 ENCOUNTER — TELEPHONE (OUTPATIENT)
Dept: TRANSPLANT | Facility: CLINIC | Age: 67
End: 2022-07-15

## 2022-07-15 DIAGNOSIS — Z79.4 TYPE 2 DIABETES MELLITUS WITH DIABETIC NEPHROPATHY, WITH LONG-TERM CURRENT USE OF INSULIN (H): ICD-10-CM

## 2022-07-15 DIAGNOSIS — Z94.1 HEART REPLACED BY TRANSPLANT (H): Primary | ICD-10-CM

## 2022-07-15 DIAGNOSIS — Z13.220 LIPID SCREENING: ICD-10-CM

## 2022-07-15 DIAGNOSIS — E11.21 TYPE 2 DIABETES MELLITUS WITH DIABETIC NEPHROPATHY, WITH LONG-TERM CURRENT USE OF INSULIN (H): ICD-10-CM

## 2022-07-15 DIAGNOSIS — Z12.5 PROSTATE CANCER SCREENING: ICD-10-CM

## 2022-07-15 RX ORDER — POTASSIUM CHLORIDE 1500 MG/1
40 TABLET, EXTENDED RELEASE ORAL
Status: CANCELLED | OUTPATIENT
Start: 2022-07-15

## 2022-07-15 RX ORDER — LIDOCAINE 40 MG/G
CREAM TOPICAL
Status: CANCELLED | OUTPATIENT
Start: 2022-07-15

## 2022-07-15 RX ORDER — POTASSIUM CHLORIDE 1500 MG/1
20 TABLET, EXTENDED RELEASE ORAL
Status: CANCELLED | OUTPATIENT
Start: 2022-07-15

## 2022-07-15 RX ORDER — ASPIRIN 81 MG/1
243 TABLET, CHEWABLE ORAL ONCE
Status: CANCELLED | OUTPATIENT
Start: 2022-07-15

## 2022-07-15 RX ORDER — SODIUM CHLORIDE 9 MG/ML
INJECTION, SOLUTION INTRAVENOUS CONTINUOUS
Status: CANCELLED | OUTPATIENT
Start: 2022-07-15

## 2022-07-15 RX ORDER — ASPIRIN 325 MG
325 TABLET ORAL ONCE
Status: CANCELLED | OUTPATIENT
Start: 2022-07-15 | End: 2022-07-15

## 2022-07-18 ENCOUNTER — TELEPHONE (OUTPATIENT)
Dept: TRANSPLANT | Facility: CLINIC | Age: 67
End: 2022-07-18

## 2022-07-18 NOTE — TELEPHONE ENCOUNTER
Spoke with the patient and confirmed SYEDA follow up  appointment on 9/12/22 and 9/26/22.  Informed patient an itinerary can be accessed on [employer].

## 2022-07-20 DIAGNOSIS — E11.3513 TYPE 2 DIABETES MELLITUS WITH PROLIFERATIVE RETINOPATHY OF BOTH EYES AND MACULAR EDEMA, UNSPECIFIED WHETHER LONG TERM INSULIN USE (H): Primary | ICD-10-CM

## 2022-08-04 ENCOUNTER — OFFICE VISIT (OUTPATIENT)
Dept: OPHTHALMOLOGY | Facility: CLINIC | Age: 67
End: 2022-08-04
Attending: OPHTHALMOLOGY
Payer: COMMERCIAL

## 2022-08-04 DIAGNOSIS — H33.42 TRACTION DETACHMENT OF LEFT RETINA: ICD-10-CM

## 2022-08-04 DIAGNOSIS — H26.9 NUCLEAR CATARACT, NONSENILE: ICD-10-CM

## 2022-08-04 DIAGNOSIS — H43.11 VITREOUS HEMORRHAGE OF RIGHT EYE (H): Primary | ICD-10-CM

## 2022-08-04 DIAGNOSIS — E11.3513 TYPE 2 DIABETES MELLITUS WITH PROLIFERATIVE RETINOPATHY OF BOTH EYES AND MACULAR EDEMA, UNSPECIFIED WHETHER LONG TERM INSULIN USE (H): ICD-10-CM

## 2022-08-04 PROCEDURE — G0463 HOSPITAL OUTPT CLINIC VISIT: HCPCS | Mod: 25

## 2022-08-04 PROCEDURE — C9257 BEVACIZUMAB INJECTION: HCPCS | Performed by: OPHTHALMOLOGY

## 2022-08-04 PROCEDURE — 99214 OFFICE O/P EST MOD 30 MIN: CPT | Mod: 25 | Performed by: OPHTHALMOLOGY

## 2022-08-04 PROCEDURE — 67028 INJECTION EYE DRUG: CPT | Mod: RT | Performed by: OPHTHALMOLOGY

## 2022-08-04 PROCEDURE — 92134 CPTRZ OPH DX IMG PST SGM RTA: CPT | Performed by: OPHTHALMOLOGY

## 2022-08-04 PROCEDURE — 250N000011 HC RX IP 250 OP 636: Performed by: OPHTHALMOLOGY

## 2022-08-04 RX ADMIN — Medication 1.25 MG: at 09:10

## 2022-08-04 ASSESSMENT — VISUAL ACUITY
OD_SC: 20/50
METHOD: SNELLEN - LINEAR
OD_SC+: -1

## 2022-08-04 ASSESSMENT — TONOMETRY
OD_IOP_MMHG: 10
IOP_METHOD: TONOPEN
OS_IOP_MMHG: 15

## 2022-08-04 ASSESSMENT — CONF VISUAL FIELD
OS_INFERIOR_TEMPORAL_RESTRICTION: 2
OD_SUPERIOR_TEMPORAL_RESTRICTION: 3
OS_SUPERIOR_NASAL_RESTRICTION: 2
OD_SUPERIOR_NASAL_RESTRICTION: 3
OS_INFERIOR_NASAL_RESTRICTION: 2
OD_INFERIOR_NASAL_RESTRICTION: 3
OD_INFERIOR_TEMPORAL_RESTRICTION: 3
OS_SUPERIOR_TEMPORAL_RESTRICTION: 2

## 2022-08-04 ASSESSMENT — SLIT LAMP EXAM - LIDS
COMMENTS: NORMAL
COMMENTS: NORMAL

## 2022-08-04 ASSESSMENT — CUP TO DISC RATIO: OD_RATIO: 0.25

## 2022-08-04 ASSESSMENT — EXTERNAL EXAM - RIGHT EYE: OD_EXAM: NORMAL

## 2022-08-04 ASSESSMENT — EXTERNAL EXAM - LEFT EYE: OS_EXAM: NORMAL

## 2022-08-04 NOTE — NURSING NOTE
Chief Complaints and History of Present Illnesses   Patient presents with     Diabetic Retinopathy Follow Up     Chief Complaint(s) and History of Present Illness(es)     Diabetic Retinopathy Follow Up     Laterality: right eye    Onset: gradual    Onset: months ago    Quality: States va is the same since last visit      Associated symptoms: photophobia (little).  Negative for flashes and floaters    Treatments tried: no treatments    Pain scale: 0/10              Comments     Here for Proliferative Diabetic Retinopathy, right   Red top  Green top    Lab Results       Component                Value               Date                       A1C                      7.4                 03/14/2022                 A1C                      7.3                 11/17/2021                 A1C                      7.8                 10/15/2021                 A1C                      7.3                 07/19/2021                 A1C                      6.7                 01/14/2021                 A1C                      5.9                 12/07/2020                 A1C                      8.2                 07/03/2020                 A1C                      7.9                 07/08/2019                 A1C                      8.5                 03/11/2019             Robyn Rios COT 8:34 AM August 4, 2022

## 2022-08-04 NOTE — PROGRESS NOTES
CC: Follow up proliferative diabetic retinopathy and slowly clearing vitreous hemorrhage right eye     Interval: Patient reports stable vision since last visit.     HPI: 66 year old man PMHx of CAD (s/p 3v CABG 2008), ischemic CM, now s/p orthotopic heart transplant 07/2020, CKD, HTN, HLD, obesity, CHANTEL and IDDM2 with history of PDR both eyes who presented to [facility] in 05/17/2019 with bilateral vitreous hemorrhages.     His left eye is s/p PPV/MP/retinectomy 12/21/20. 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   There was NVI 06/02/2022,     Eye drops  Latanoprost at bedtime both eyes   PredForte every other day left eye    RETINAL IMAGING  Optical Coherence Tomography: 08/04/2022 vs 06/02/2022  Right eye: interval resolution in central IR  Left eye: Irregular retina; ERM; nasal to fovea with large bullous edema, temporal with subretinal fibrosis / trace edema, Atrophic thinned retina IT macula. = stable     FA 1/19/22  right eye: PRP scars. Foci of NV superonasal.     Assessment and Plan  # Proliferative Diabetic Retinopathy, right eye    # Vitreous hemorrhage, right eye -  Resolving (onset 4/2021)   - Responded to multiple Avastin, nearly resolved 08/04/2022   - last Avastin 06/02/22 (6 week interval)    - Plan injection Avastin right eye today   - return to clinic 2 weeks Panretinal laser photocoagulation (PRP) fill in right eye    # Proliferative Diabetic Retinopathy, left eye     - New NVI, left eye 6/2/2022   - Gonio 6/2/2022 broad PAS superiorly ~3 clock hours, narrow PAS inferiorly <1 clock hour, no NVA   - Regressing 08/04/2022   - IOP 08/04/2022 15   - s/p PRP 6/5/2019   - s/p avastin last injection 06/02/2022   - Observe today, return to clinic 6 weeks possible Avastin left eye      # Funnel RD left eye   - progressed to funnel RD after loss to follow up given heart surgeries     - w retina folded on itself under SO   - guarded prognosis discussed   - Continue PredForte every other  day left eye    # Ocuar HTN, both eyes    - Continue Latanoprost at bedtime both eyes    - IOP wnl     # Visually significant cataract right eye    - IOL calcs obtained and A-scan completed 3/10/2022   - improve CME prior to CEIOL    - CE/IOL agreed 06/02/2022, now pending scheduling   - Schedule surgery    RTC 2 weeks Panretinal laser photocoagulation (PRP) fill in right eye     ~~~~~~~~~~~~~~~~~~~~~~~~~~~~~~~~~~   Complete documentation of historical and exam elements from today's encounter can be found in the full encounter summary report (not reduplicated in this progress note).  I personally obtained the chief complaint(s) and history of present illness.  I confirmed and edited as necessary the review of systems, past medical/surgical history, family history, social history, and examination findings as documented by others; and I examined the patient myself.  I personally reviewed the relevant tests, images, and reports as documented above.  I personally reviewed the ophthalmic test(s) associated with this encounter, agree with the interpretation(s) as documented by the resident/fellow, and have edited the corresponding report(s) as necessary.   I formulated and edited as necessary the assessment and plan and discussed the findings and management plan with the patient and family and I was present for critical portions of the procedure carried out by the resident/fellow and immediately available for the entire procedure    Triny Bunch MD  Professor of Ophthalmology.  Retina Service   Department of Ophthalmology and Visual Neurosciences   [facility]  Phone: [phone]   Fax: [phone]

## 2022-08-24 ENCOUNTER — OFFICE VISIT (OUTPATIENT)
Dept: OPHTHALMOLOGY | Facility: CLINIC | Age: 67
End: 2022-08-24
Attending: OPHTHALMOLOGY
Payer: COMMERCIAL

## 2022-08-24 DIAGNOSIS — H43.11 VITREOUS HEMORRHAGE OF RIGHT EYE (H): ICD-10-CM

## 2022-08-24 DIAGNOSIS — E11.3513 TYPE 2 DIABETES MELLITUS WITH PROLIFERATIVE RETINOPATHY OF BOTH EYES AND MACULAR EDEMA, UNSPECIFIED WHETHER LONG TERM INSULIN USE (H): Primary | ICD-10-CM

## 2022-08-24 PROCEDURE — 999N000103 HC STATISTIC NO CHARGE FACILITY FEE

## 2022-08-24 PROCEDURE — 250N000009 HC RX 250: Performed by: OPHTHALMOLOGY

## 2022-08-24 PROCEDURE — 99214 OFFICE O/P EST MOD 30 MIN: CPT | Mod: 25 | Performed by: STUDENT IN AN ORGANIZED HEALTH CARE EDUCATION/TRAINING PROGRAM

## 2022-08-24 PROCEDURE — 67228 TREATMENT X10SV RETINOPATHY: CPT | Mod: RT | Performed by: STUDENT IN AN ORGANIZED HEALTH CARE EDUCATION/TRAINING PROGRAM

## 2022-08-24 PROCEDURE — 67228 TREATMENT X10SV RETINOPATHY: CPT | Mod: RT | Performed by: OPHTHALMOLOGY

## 2022-08-24 RX ORDER — LIDOCAINE HYDROCHLORIDE 20 MG/ML
0.5 INJECTION, SOLUTION EPIDURAL; INFILTRATION; INTRACAUDAL; PERINEURAL ONCE
Status: COMPLETED | OUTPATIENT
Start: 2022-08-24 | End: 2022-08-24

## 2022-08-24 RX ADMIN — LIDOCAINE HYDROCHLORIDE 0.5 ML: 20 INJECTION, SOLUTION EPIDURAL; INFILTRATION; INTRACAUDAL; PERINEURAL at 08:07

## 2022-08-24 ASSESSMENT — CONF VISUAL FIELD
OS_INFERIOR_NASAL_RESTRICTION: 2
OD_INFERIOR_TEMPORAL_RESTRICTION: 3
OS_SUPERIOR_TEMPORAL_RESTRICTION: 2
OD_SUPERIOR_TEMPORAL_RESTRICTION: 3
OD_INFERIOR_NASAL_RESTRICTION: 3
OD_SUPERIOR_NASAL_RESTRICTION: 3
OS_INFERIOR_TEMPORAL_RESTRICTION: 2
OS_SUPERIOR_NASAL_RESTRICTION: 2

## 2022-08-24 ASSESSMENT — VISUAL ACUITY
OD_SC: 20/60
OD_SC+: -2
OD_PH_SC: 20/50
METHOD: SNELLEN - LINEAR
OD_PH_SC+: -3
OS_SC: 2'/200 E CARD

## 2022-08-24 ASSESSMENT — EXTERNAL EXAM - RIGHT EYE: OD_EXAM: NORMAL

## 2022-08-24 ASSESSMENT — TONOMETRY
OS_IOP_MMHG: 14
OD_IOP_MMHG: 17
IOP_METHOD: TONOPEN

## 2022-08-24 ASSESSMENT — EXTERNAL EXAM - LEFT EYE: OS_EXAM: NORMAL

## 2022-08-24 ASSESSMENT — SLIT LAMP EXAM - LIDS
COMMENTS: NORMAL
COMMENTS: NORMAL

## 2022-08-24 ASSESSMENT — CUP TO DISC RATIO: OD_RATIO: 0.25

## 2022-08-24 NOTE — PROGRESS NOTES
CC: Follow up proliferative diabetic retinopathy and slowly clearing vitreous hemorrhage right eye     Interval: Patient reports stable vision in both eyes since last visit. He denies new flashes of light or floaters in the right eye.   Lab Results   Component Value Date    A1C 7.4 03/14/2022    A1C 7.3 11/17/2021       HPI: 66 year old man PMHx of CAD (s/p 3v CABG 2008), ischemic CM, now s/p orthotopic heart transplant 07/2020, CKD, HTN, HLD, obesity, CHANTEL and IDDM2 with history of PDR both eyes who presented to [facility] in 05/17/2019 with bilateral vitreous hemorrhages.     His left eye is s/p PPV/MP/retinectomy 12/21/20. 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   There was NVI 06/02/2022,     Eye drops  Latanoprost at bedtime both eyes   PredForte every other day left eye    RETINAL IMAGING  Optical Coherence Tomography: 08/04/2022 vs 06/02/2022  Right eye: interval resolution in central IR  Left eye: Irregular retina; ERM; nasal to fovea with large bullous edema, temporal with subretinal fibrosis / trace edema, Atrophic thinned retina IT macula. = stable     FA 1/19/22  right eye: PRP scars. Foci of NV superonasal.     Assessment and Plan  # Proliferative Diabetic Retinopathy, right eye    # Vitreous hemorrhage, right eye -  Resolving (onset 4/2021)   - Responded to multiple Avastin, nearly resolved 08/04/2022   - last Avastin 08/04/22 (6 week interval)    - R/B/A Fill-in PRP right eye 08/24/2022: #764 spots     - RTC 09/14/2022 for Avastin right eye     # Proliferative Diabetic Retinopathy, left eye     - New NVI, left eye 6/2/2022   - Gonio 6/2/2022 broad PAS superiorly ~3 clock hours, narrow PAS inferiorly <1 clock hour, no NVA   - Regressing 08/04/2022   - IOP 08/04/2022 15   - s/p PRP 6/5/2019   - s/p avastin last injection 06/02/2022   - Observe today, return to clinic 6 weeks possible Avastin left eye      # Funnel RD left eye   - progressed to funnel RD after loss to follow up given  heart surgeries     - w retina folded on itself under SO   - guarded prognosis discussed   - Continue PredForte every other day left eye    # Ocuar HTN, both eyes    - Continue Latanoprost at bedtime both eyes    - IOP wnl     # Visually significant cataract right eye    - IOL calcs obtained and A-scan completed 3/10/2022   - improve CME prior to CEIOL    - CE/IOL agreed 06/02/2022, now pending scheduling   - Schedule surgery    RTC 3 weeks Avastin right eye    My privilege to be part of your care,  Giovanny Moseley MD, MSc  Ophthalmology PGY-3 resident physician  Pager: [phone]    ~~~~~~~~~~~~~~~~~~~~~~~~~~~~~~~~~~   Complete documentation of historical and exam elements from today's encounter can be found in the full encounter summary report (not reduplicated in this progress note).  I personally obtained the chief complaint(s) and history of present illness.  I confirmed and edited as necessary the review of systems, past medical/surgical history, family history, social history, and examination findings as documented by others; and I examined the patient myself.  I personally reviewed the relevant tests, images, and reports as documented above.  I personally reviewed the ophthalmic test(s) associated with this encounter, agree with the interpretation(s) as documented by the resident/fellow, and have edited the corresponding report(s) as necessary.   I formulated and edited as necessary the assessment and plan and discussed the findings and management plan with the patient and family and I was present for critical portions of the procedure carried out by the resident/fellow and immediately available for the entire procedure    Triny Bunch MD  Professor of Ophthalmology.  Retina Service   Department of Ophthalmology and Visual Neurosciences   [facility]  Phone: [phone]   Fax: [phone]

## 2022-08-24 NOTE — NURSING NOTE
"Chief Complaints and History of Present Illnesses   Patient presents with     Vitreous Hemorrhage Follow Up     2 week follow up for Vitreous hemorrhage right eye with possible PRP right eye today. Hx PDR each eye.   Patient reports vision is stable each eye in the last couple weeks. Patient states compliant with eye drops.      Chief Complaint(s) and History of Present Illness(es)     Vitreous Hemorrhage Follow Up     Laterality: right eye    Onset: weeks ago    Quality: blurred vision    Associated symptoms: Negative for eye pain, photophobia, flashes and floaters    Pain scale: 0/10    Comments: 2 week follow up for Vitreous hemorrhage right eye with possible PRP right eye today. Hx PDR each eye.   Patient reports vision is stable each eye in the last couple weeks. Patient states compliant with eye drops.               Comments     Ocular meds:   - Pred Forte (pink top) every other day left eye  - Latanoprost (teal top) at bedtime each eye, \"using in right eye only\"     DM2  Last BS: 137 this morning  Lab Results       Component                Value               Date                       A1C                      7.4                 03/14/2022                 A1C                      7.3                 11/17/2021                 A1C                      7.8                 10/15/2021                 A1C                      7.3                 07/19/2021                 A1C                      6.7                 01/14/2021                 A1C                      5.9                 12/07/2020                 A1C                      8.2                 07/03/2020                 A1C                      7.9                 07/08/2019                 A1C                      8.5                 03/11/2019              MAYE De León 7:39 AM 08/24/2022                  "

## 2022-09-01 DIAGNOSIS — Z94.1 HEART TRANSPLANT, ORTHOTOPIC, STATUS (H): ICD-10-CM

## 2022-09-02 RX ORDER — TAMSULOSIN HYDROCHLORIDE 0.4 MG/1
CAPSULE ORAL
Qty: 90 CAPSULE | Refills: 3 | Status: SHIPPED | OUTPATIENT
Start: 2022-09-02 | End: 2023-11-14

## 2022-09-08 ENCOUNTER — TELEPHONE (OUTPATIENT)
Dept: TRANSPLANT | Facility: CLINIC | Age: 67
End: 2022-09-08

## 2022-09-08 DIAGNOSIS — Z94.1 HEART REPLACED BY TRANSPLANT (H): Primary | ICD-10-CM

## 2022-09-08 DIAGNOSIS — Z79.4 TYPE 2 DIABETES MELLITUS WITH DIABETIC NEPHROPATHY, WITH LONG-TERM CURRENT USE OF INSULIN (H): ICD-10-CM

## 2022-09-08 DIAGNOSIS — Z13.220 LIPID SCREENING: ICD-10-CM

## 2022-09-08 DIAGNOSIS — Z12.5 PROSTATE CANCER SCREENING: ICD-10-CM

## 2022-09-08 DIAGNOSIS — E11.21 TYPE 2 DIABETES MELLITUS WITH DIABETIC NEPHROPATHY, WITH LONG-TERM CURRENT USE OF INSULIN (H): ICD-10-CM

## 2022-09-08 RX ORDER — ASPIRIN 81 MG/1
243 TABLET, CHEWABLE ORAL ONCE
Status: CANCELLED | OUTPATIENT
Start: 2022-09-08

## 2022-09-08 RX ORDER — LIDOCAINE 40 MG/G
CREAM TOPICAL
Status: CANCELLED | OUTPATIENT
Start: 2022-09-08

## 2022-09-08 RX ORDER — ASPIRIN 325 MG
325 TABLET ORAL ONCE
Status: CANCELLED | OUTPATIENT
Start: 2022-09-08 | End: 2022-09-08

## 2022-09-08 RX ORDER — SODIUM CHLORIDE 9 MG/ML
INJECTION, SOLUTION INTRAVENOUS CONTINUOUS
Status: CANCELLED | OUTPATIENT
Start: 2022-09-08

## 2022-09-08 RX ORDER — POTASSIUM CHLORIDE 1500 MG/1
40 TABLET, EXTENDED RELEASE ORAL
Status: CANCELLED | OUTPATIENT
Start: 2022-09-08

## 2022-09-08 RX ORDER — POTASSIUM CHLORIDE 1500 MG/1
20 TABLET, EXTENDED RELEASE ORAL
Status: CANCELLED | OUTPATIENT
Start: 2022-09-08

## 2022-09-09 ENCOUNTER — TELEPHONE (OUTPATIENT)
Dept: CARDIOLOGY | Facility: CLINIC | Age: 67
End: 2022-09-09

## 2022-09-09 ENCOUNTER — TELEPHONE (OUTPATIENT)
Dept: TRANSPLANT | Facility: CLINIC | Age: 67
End: 2022-09-09

## 2022-09-09 NOTE — TELEPHONE ENCOUNTER
Call complete via  for pre procedure reminder and updated visitor policy.  Aware to complete covid test on Saturday or Sunday and take picture of results

## 2022-09-09 NOTE — TELEPHONE ENCOUNTER
Pt called using - pt reminded of Monday's appointments and prep.  Pt to bring a photo of a negative Covid test to cath lab on Monday AM.  NPO after midnight, time meds (Tac and Rapa) for 12 and 24 hour troughs, and need for  at the end of the day.  Pt states understanding instructions and plan of care.

## 2022-09-12 ENCOUNTER — HOSPITAL ENCOUNTER (OUTPATIENT)
Facility: CLINIC | Age: 67
Discharge: HOME | End: 2022-09-12
Attending: INTERNAL MEDICINE | Admitting: INTERNAL MEDICINE
Payer: COMMERCIAL

## 2022-09-12 ENCOUNTER — APPOINTMENT (OUTPATIENT)
Dept: LAB | Facility: CLINIC | Age: 67
End: 2022-09-12
Payer: COMMERCIAL

## 2022-09-12 ENCOUNTER — APPOINTMENT (OUTPATIENT)
Dept: MEDSURG UNIT | Facility: CLINIC | Age: 67
End: 2022-09-12
Payer: COMMERCIAL

## 2022-09-12 ENCOUNTER — OFFICE VISIT (OUTPATIENT)
Dept: CARDIOLOGY | Facility: CLINIC | Age: 67
End: 2022-09-12
Attending: INTERNAL MEDICINE
Payer: COMMERCIAL

## 2022-09-12 ENCOUNTER — HOSPITAL ENCOUNTER (OUTPATIENT)
Dept: GENERAL RADIOLOGY | Facility: CLINIC | Age: 67
Discharge: HOME | End: 2022-09-12
Attending: INTERNAL MEDICINE
Payer: COMMERCIAL

## 2022-09-12 VITALS
SYSTOLIC BLOOD PRESSURE: 157 MMHG | HEIGHT: 66 IN | OXYGEN SATURATION: 97 % | HEART RATE: 94 BPM | BODY MASS INDEX: 29.67 KG/M2 | DIASTOLIC BLOOD PRESSURE: 84 MMHG | WEIGHT: 184.6 LBS

## 2022-09-12 VITALS
HEIGHT: 65 IN | OXYGEN SATURATION: 96 % | TEMPERATURE: 97.6 F | SYSTOLIC BLOOD PRESSURE: 156 MMHG | RESPIRATION RATE: 17 BRPM | DIASTOLIC BLOOD PRESSURE: 82 MMHG | BODY MASS INDEX: 31.07 KG/M2 | HEART RATE: 88 BPM | WEIGHT: 186.51 LBS

## 2022-09-12 DIAGNOSIS — Z94.1 HEART TRANSPLANT, ORTHOTOPIC, STATUS (H): Primary | ICD-10-CM

## 2022-09-12 DIAGNOSIS — Z12.5 PROSTATE CANCER SCREENING: ICD-10-CM

## 2022-09-12 DIAGNOSIS — Z94.1 HEART REPLACED BY TRANSPLANT (H): Primary | ICD-10-CM

## 2022-09-12 DIAGNOSIS — Z13.220 LIPID SCREENING: ICD-10-CM

## 2022-09-12 DIAGNOSIS — E11.21 TYPE 2 DIABETES MELLITUS WITH DIABETIC NEPHROPATHY, WITH LONG-TERM CURRENT USE OF INSULIN (H): ICD-10-CM

## 2022-09-12 DIAGNOSIS — Z79.4 TYPE 2 DIABETES MELLITUS WITH DIABETIC NEPHROPATHY, WITH LONG-TERM CURRENT USE OF INSULIN (H): ICD-10-CM

## 2022-09-12 DIAGNOSIS — Z94.1 HEART REPLACED BY TRANSPLANT (H): ICD-10-CM

## 2022-09-12 DIAGNOSIS — D75.829 HIT (HEPARIN-INDUCED THROMBOCYTOPENIA) (H): ICD-10-CM

## 2022-09-12 LAB
ALBUMIN SERPL BCG-MCNC: 3.9 G/DL (ref 3.5–5.2)
ALP SERPL-CCNC: 146 U/L (ref 40–129)
ALT SERPL W P-5'-P-CCNC: 23 U/L (ref 10–50)
ANION GAP SERPL CALCULATED.3IONS-SCNC: 8 MMOL/L (ref 7–15)
AST SERPL W P-5'-P-CCNC: 30 U/L (ref 10–50)
BASOPHILS # BLD AUTO: 0 10E3/UL (ref 0–0.2)
BASOPHILS NFR BLD AUTO: 0 %
BILIRUB SERPL-MCNC: 0.3 MG/DL
BUN SERPL-MCNC: 24.1 MG/DL (ref 8–23)
CALCIUM SERPL-MCNC: 9.3 MG/DL (ref 8.8–10.2)
CHLORIDE SERPL-SCNC: 101 MMOL/L (ref 98–107)
CHOLEST SERPL-MCNC: 115 MG/DL
CK SERPL-CCNC: 160 U/L (ref 39–308)
CMV DNA SPEC NAA+PROBE-ACNC: NOT DETECTED IU/ML
CREAT SERPL-MCNC: 1.75 MG/DL (ref 0.67–1.17)
DEPRECATED HCO3 PLAS-SCNC: 29 MMOL/L (ref 22–29)
EOSINOPHIL # BLD AUTO: 0 10E3/UL (ref 0–0.7)
EOSINOPHIL NFR BLD AUTO: 1 %
ERYTHROCYTE [DISTWIDTH] IN BLOOD BY AUTOMATED COUNT: 15.8 % (ref 10–15)
GFR SERPL CREATININE-BSD FRML MDRD: 42 ML/MIN/1.73M2
GLUCOSE BLDC GLUCOMTR-MCNC: 103 MG/DL (ref 70–99)
GLUCOSE SERPL-MCNC: 87 MG/DL (ref 70–99)
HBA1C MFR BLD: 8.4 %
HCT VFR BLD AUTO: 36 % (ref 40–53)
HDLC SERPL-MCNC: 32 MG/DL
HGB BLD-MCNC: 10.5 G/DL (ref 13.3–17.7)
HGB BLD-MCNC: 11.2 G/DL (ref 13.3–17.7)
IMM GRANULOCYTES # BLD: 0.1 10E3/UL
IMM GRANULOCYTES NFR BLD: 1 %
LDLC SERPL CALC-MCNC: 38 MG/DL
LYMPHOCYTES # BLD AUTO: 1.6 10E3/UL (ref 0.8–5.3)
LYMPHOCYTES NFR BLD AUTO: 25 %
MAGNESIUM SERPL-MCNC: 1.8 MG/DL (ref 1.7–2.3)
MCH RBC QN AUTO: 26.5 PG (ref 26.5–33)
MCHC RBC AUTO-ENTMCNC: 31.1 G/DL (ref 31.5–36.5)
MCV RBC AUTO: 85 FL (ref 78–100)
MONOCYTES # BLD AUTO: 0.7 10E3/UL (ref 0–1.3)
MONOCYTES NFR BLD AUTO: 10 %
NEUTROPHILS # BLD AUTO: 3.9 10E3/UL (ref 1.6–8.3)
NEUTROPHILS NFR BLD AUTO: 63 %
NONHDLC SERPL-MCNC: 83 MG/DL
NRBC # BLD AUTO: 0 10E3/UL
NRBC BLD AUTO-RTO: 0 /100
OXYHGB MFR BLDV: 67 % (ref 92–100)
PHOSPHATE SERPL-MCNC: 2.5 MG/DL (ref 2.5–4.5)
PLATELET # BLD AUTO: 210 10E3/UL (ref 150–450)
POTASSIUM SERPL-SCNC: 3.7 MMOL/L (ref 3.4–5.3)
PROT SERPL-MCNC: 6.8 G/DL (ref 6.4–8.3)
PSA SERPL-MCNC: 0.26 NG/ML (ref 0–4.5)
RBC # BLD AUTO: 4.23 10E6/UL (ref 4.4–5.9)
SODIUM SERPL-SCNC: 138 MMOL/L (ref 136–145)
TACROLIMUS BLD-MCNC: 4.1 UG/L (ref 5–15)
TME LAST DOSE: ABNORMAL H
TME LAST DOSE: ABNORMAL H
TRIGL SERPL-MCNC: 223 MG/DL
WBC # BLD AUTO: 6.3 10E3/UL (ref 4–11)

## 2022-09-12 PROCEDURE — 86833 HLA CLASS II HIGH DEFIN QUAL: CPT | Performed by: INTERNAL MEDICINE

## 2022-09-12 PROCEDURE — 80061 LIPID PANEL: CPT | Performed by: INTERNAL MEDICINE

## 2022-09-12 PROCEDURE — C1894 INTRO/SHEATH, NON-LASER: HCPCS | Performed by: INTERNAL MEDICINE

## 2022-09-12 PROCEDURE — C1887 CATHETER, GUIDING: HCPCS | Performed by: INTERNAL MEDICINE

## 2022-09-12 PROCEDURE — 250N000011 HC RX IP 250 OP 636: Performed by: INTERNAL MEDICINE

## 2022-09-12 PROCEDURE — 82550 ASSAY OF CK (CPK): CPT | Performed by: INTERNAL MEDICINE

## 2022-09-12 PROCEDURE — 82962 GLUCOSE BLOOD TEST: CPT

## 2022-09-12 PROCEDURE — 272N000001 HC OR GENERAL SUPPLY STERILE: Performed by: INTERNAL MEDICINE

## 2022-09-12 PROCEDURE — 92978 ENDOLUMINL IVUS OCT C 1ST: CPT | Performed by: INTERNAL MEDICINE

## 2022-09-12 PROCEDURE — 99152 MOD SED SAME PHYS/QHP 5/>YRS: CPT | Mod: GC | Performed by: INTERNAL MEDICINE

## 2022-09-12 PROCEDURE — G0103 PSA SCREENING: HCPCS | Performed by: INTERNAL MEDICINE

## 2022-09-12 PROCEDURE — 84100 ASSAY OF PHOSPHORUS: CPT | Performed by: INTERNAL MEDICINE

## 2022-09-12 PROCEDURE — 250N000009 HC RX 250: Performed by: INTERNAL MEDICINE

## 2022-09-12 PROCEDURE — 82810 BLOOD GASES O2 SAT ONLY: CPT

## 2022-09-12 PROCEDURE — 93505 ENDOMYOCARDIAL BIOPSY: CPT | Mod: XU | Performed by: INTERNAL MEDICINE

## 2022-09-12 PROCEDURE — 250N000013 HC RX MED GY IP 250 OP 250 PS 637: Performed by: INTERNAL MEDICINE

## 2022-09-12 PROCEDURE — 87799 DETECT AGENT NOS DNA QUANT: CPT | Performed by: INTERNAL MEDICINE

## 2022-09-12 PROCEDURE — 93454 CORONARY ARTERY ANGIO S&I: CPT | Mod: 26 | Performed by: INTERNAL MEDICINE

## 2022-09-12 PROCEDURE — 258N000003 HC RX IP 258 OP 636: Performed by: INTERNAL MEDICINE

## 2022-09-12 PROCEDURE — G0463 HOSPITAL OUTPT CLINIC VISIT: HCPCS

## 2022-09-12 PROCEDURE — C1753 CATH, INTRAVAS ULTRASOUND: HCPCS | Performed by: INTERNAL MEDICINE

## 2022-09-12 PROCEDURE — 86832 HLA CLASS I HIGH DEFIN QUAL: CPT | Performed by: INTERNAL MEDICINE

## 2022-09-12 PROCEDURE — 86352 CELL FUNCTION ASSAY W/STIM: CPT | Performed by: INTERNAL MEDICINE

## 2022-09-12 PROCEDURE — 99153 MOD SED SAME PHYS/QHP EA: CPT | Performed by: INTERNAL MEDICINE

## 2022-09-12 PROCEDURE — 92979 ENDOLUMINL IVUS OCT C EA: CPT

## 2022-09-12 PROCEDURE — 93010 ELECTROCARDIOGRAM REPORT: CPT | Performed by: INTERNAL MEDICINE

## 2022-09-12 PROCEDURE — 999N000054 HC STATISTIC EKG NON-CHARGEABLE

## 2022-09-12 PROCEDURE — 83036 HEMOGLOBIN GLYCOSYLATED A1C: CPT | Performed by: INTERNAL MEDICINE

## 2022-09-12 PROCEDURE — 88350 IMFLUOR EA ADDL 1ANTB STN PX: CPT | Mod: TC | Performed by: INTERNAL MEDICINE

## 2022-09-12 PROCEDURE — 85018 HEMOGLOBIN: CPT

## 2022-09-12 PROCEDURE — 93505 ENDOMYOCARDIAL BIOPSY: CPT | Mod: 26 | Performed by: INTERNAL MEDICINE

## 2022-09-12 PROCEDURE — 85025 COMPLETE CBC W/AUTO DIFF WBC: CPT | Performed by: INTERNAL MEDICINE

## 2022-09-12 PROCEDURE — 71046 X-RAY EXAM CHEST 2 VIEWS: CPT

## 2022-09-12 PROCEDURE — 99215 OFFICE O/P EST HI 40 MIN: CPT | Performed by: INTERNAL MEDICINE

## 2022-09-12 PROCEDURE — 36415 COLL VENOUS BLD VENIPUNCTURE: CPT | Performed by: INTERNAL MEDICINE

## 2022-09-12 PROCEDURE — 80053 COMPREHEN METABOLIC PANEL: CPT | Performed by: INTERNAL MEDICINE

## 2022-09-12 PROCEDURE — C1769 GUIDE WIRE: HCPCS | Performed by: INTERNAL MEDICINE

## 2022-09-12 PROCEDURE — 36592 COLLECT BLOOD FROM PICC: CPT | Performed by: INTERNAL MEDICINE

## 2022-09-12 PROCEDURE — 92978 ENDOLUMINL IVUS OCT C 1ST: CPT

## 2022-09-12 PROCEDURE — 99152 MOD SED SAME PHYS/QHP 5/>YRS: CPT | Performed by: INTERNAL MEDICINE

## 2022-09-12 PROCEDURE — 71046 X-RAY EXAM CHEST 2 VIEWS: CPT | Mod: 26 | Performed by: RADIOLOGY

## 2022-09-12 PROCEDURE — 83735 ASSAY OF MAGNESIUM: CPT | Performed by: INTERNAL MEDICINE

## 2022-09-12 PROCEDURE — 250N000013 HC RX MED GY IP 250 OP 250 PS 637: Performed by: STUDENT IN AN ORGANIZED HEALTH CARE EDUCATION/TRAINING PROGRAM

## 2022-09-12 PROCEDURE — 80197 ASSAY OF TACROLIMUS: CPT | Performed by: INTERNAL MEDICINE

## 2022-09-12 PROCEDURE — 93456 R HRT CORONARY ARTERY ANGIO: CPT | Performed by: INTERNAL MEDICINE

## 2022-09-12 RX ORDER — NALOXONE HYDROCHLORIDE 0.4 MG/ML
0.4 INJECTION, SOLUTION INTRAMUSCULAR; INTRAVENOUS; SUBCUTANEOUS
Status: DISCONTINUED | OUTPATIENT
Start: 2022-09-12 | End: 2022-09-12 | Stop reason: HOSPADM

## 2022-09-12 RX ORDER — POTASSIUM CHLORIDE 750 MG/1
40 TABLET, EXTENDED RELEASE ORAL
Status: DISCONTINUED | OUTPATIENT
Start: 2022-09-12 | End: 2022-09-12 | Stop reason: HOSPADM

## 2022-09-12 RX ORDER — NALOXONE HYDROCHLORIDE 0.4 MG/ML
0.2 INJECTION, SOLUTION INTRAMUSCULAR; INTRAVENOUS; SUBCUTANEOUS
Status: DISCONTINUED | OUTPATIENT
Start: 2022-09-12 | End: 2022-09-12 | Stop reason: HOSPADM

## 2022-09-12 RX ORDER — ASPIRIN 81 MG/1
243 TABLET, CHEWABLE ORAL ONCE
Status: COMPLETED | OUTPATIENT
Start: 2022-09-12 | End: 2022-09-12

## 2022-09-12 RX ORDER — LIDOCAINE 40 MG/G
CREAM TOPICAL
Status: DISCONTINUED | OUTPATIENT
Start: 2022-09-12 | End: 2022-09-12 | Stop reason: HOSPADM

## 2022-09-12 RX ORDER — FLUMAZENIL 0.1 MG/ML
0.2 INJECTION, SOLUTION INTRAVENOUS
Status: DISCONTINUED | OUTPATIENT
Start: 2022-09-12 | End: 2022-09-12 | Stop reason: HOSPADM

## 2022-09-12 RX ORDER — ASPIRIN 81 MG/1
243 TABLET, CHEWABLE ORAL ONCE
Status: DISCONTINUED | OUTPATIENT
Start: 2022-09-12 | End: 2022-09-12 | Stop reason: HOSPADM

## 2022-09-12 RX ORDER — SODIUM CHLORIDE 9 MG/ML
INJECTION, SOLUTION INTRAVENOUS CONTINUOUS
Status: DISCONTINUED | OUTPATIENT
Start: 2022-09-12 | End: 2022-09-12 | Stop reason: HOSPADM

## 2022-09-12 RX ORDER — FENTANYL CITRATE 50 UG/ML
25 INJECTION, SOLUTION INTRAMUSCULAR; INTRAVENOUS
Status: DISCONTINUED | OUTPATIENT
Start: 2022-09-12 | End: 2022-09-12 | Stop reason: HOSPADM

## 2022-09-12 RX ORDER — NITROGLYCERIN 5 MG/ML
VIAL (ML) INTRAVENOUS
Status: DISCONTINUED | OUTPATIENT
Start: 2022-09-12 | End: 2022-09-12 | Stop reason: HOSPADM

## 2022-09-12 RX ORDER — ASPIRIN 325 MG
325 TABLET ORAL ONCE
Status: DISCONTINUED | OUTPATIENT
Start: 2022-09-12 | End: 2022-09-12 | Stop reason: HOSPADM

## 2022-09-12 RX ORDER — ASPIRIN 325 MG
325 TABLET ORAL ONCE
Status: COMPLETED | OUTPATIENT
Start: 2022-09-12 | End: 2022-09-12

## 2022-09-12 RX ORDER — ACETAMINOPHEN 325 MG/1
325 TABLET ORAL EVERY 4 HOURS PRN
Status: DISCONTINUED | OUTPATIENT
Start: 2022-09-12 | End: 2022-09-12 | Stop reason: HOSPADM

## 2022-09-12 RX ORDER — POTASSIUM CHLORIDE 750 MG/1
20 TABLET, EXTENDED RELEASE ORAL
Status: DISCONTINUED | OUTPATIENT
Start: 2022-09-12 | End: 2022-09-12 | Stop reason: HOSPADM

## 2022-09-12 RX ORDER — POTASSIUM CHLORIDE 750 MG/1
20 TABLET, EXTENDED RELEASE ORAL
Status: COMPLETED | OUTPATIENT
Start: 2022-09-12 | End: 2022-09-12

## 2022-09-12 RX ORDER — ATROPINE SULFATE 0.1 MG/ML
0.5 INJECTION INTRAVENOUS
Status: DISCONTINUED | OUTPATIENT
Start: 2022-09-12 | End: 2022-09-12 | Stop reason: HOSPADM

## 2022-09-12 RX ORDER — LISINOPRIL 10 MG/1
10 TABLET ORAL 2 TIMES DAILY
Qty: 60 TABLET | Refills: 11 | Status: SHIPPED | OUTPATIENT
Start: 2022-09-12 | End: 2023-10-05

## 2022-09-12 RX ORDER — NICARDIPINE HYDROCHLORIDE 2.5 MG/ML
INJECTION INTRAVENOUS
Status: DISCONTINUED | OUTPATIENT
Start: 2022-09-12 | End: 2022-09-12 | Stop reason: HOSPADM

## 2022-09-12 RX ORDER — OXYCODONE HYDROCHLORIDE 5 MG/1
5 TABLET ORAL EVERY 4 HOURS PRN
Status: DISCONTINUED | OUTPATIENT
Start: 2022-09-12 | End: 2022-09-12 | Stop reason: HOSPADM

## 2022-09-12 RX ORDER — OXYCODONE HYDROCHLORIDE 5 MG/1
10 TABLET ORAL EVERY 4 HOURS PRN
Status: DISCONTINUED | OUTPATIENT
Start: 2022-09-12 | End: 2022-09-12 | Stop reason: HOSPADM

## 2022-09-12 RX ORDER — FENTANYL CITRATE 50 UG/ML
INJECTION, SOLUTION INTRAMUSCULAR; INTRAVENOUS
Status: DISCONTINUED | OUTPATIENT
Start: 2022-09-12 | End: 2022-09-12 | Stop reason: HOSPADM

## 2022-09-12 RX ADMIN — ASPIRIN 325 MG ORAL TABLET 325 MG: 325 PILL ORAL at 09:44

## 2022-09-12 RX ADMIN — SODIUM CHLORIDE: 9 INJECTION, SOLUTION INTRAVENOUS at 10:15

## 2022-09-12 RX ADMIN — ACETAMINOPHEN 325 MG: 325 TABLET ORAL at 13:59

## 2022-09-12 RX ADMIN — POTASSIUM CHLORIDE 20 MEQ: 750 TABLET, EXTENDED RELEASE ORAL at 10:19

## 2022-09-12 RX ADMIN — LIDOCAINE: 40 CREAM TOPICAL at 10:15

## 2022-09-12 ASSESSMENT — ACTIVITIES OF DAILY LIVING (ADL)
ADLS_ACUITY_SCORE: 35

## 2022-09-12 ASSESSMENT — PAIN SCALES - GENERAL: PAINLEVEL: NO PAIN (0)

## 2022-09-12 NOTE — PROGRESS NOTES
Pt prepped for CORS and right heart cath.PIV placed and NS flush started.Kaiser groins were shaved and prepped and pulses marked and charted only dopplerable.IV contrast discharge instructions were reviewed and signed and a copy given to pt.Consent signed with wife at bedside.Pt took  mg and Pot 20meq for K 3.7.

## 2022-09-12 NOTE — PROGRESS NOTES
Hematology Note    Paged to discuss management for the patient with history of HIT who received small dose of heparin ~10 units during procedure with saline flush.    Chart reviewed. 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 On Fondaparinux for 3 months and discontinued later in October 2022 after HIT antibody became negative. His platelet count is normal today.    These findings consistent with remote HIT (normal platelet and negative antibody). Although heparin exposure can trigger another episode of HIT, it is unlikely with the dose of exposure and current status for HIT for this patient.    Recommendations:  - No indication for anticoagulant at this time  - Recommend CBC check at around 9/16 to ensure stable platelet trend    -- if normal, no further management required   -- if platelet drops (for HIT, typically >50% with rei >20k or at least 30% from baseline). Would recommend to send HIT antibody screen. Initiate non-heparin anticoagulant if positive while awaiting for confirmatory CORRINE.  - Monitor for symptoms of new VTEs event.    George Wolff MD  Hematology/Medical Oncology (PGY-4)  P: [phone]

## 2022-09-12 NOTE — NURSING NOTE
Chief Complaint   Patient presents with     Follow Up     Heart transplant      Vitals were taken and medications were reconciled.  Robert Leslie, EMT  4:22 PM

## 2022-09-12 NOTE — LETTER
9/12/2022      RE: Lucian Oliveira  [address] [address]       Dear Colleague,    Thank you for the opportunity to participate in the care of your patient, Lucian Oliveira, at the [facility] [place] at [facility]. Please see a copy of my visit note below.      09/12/2022    ADULT HEART TRANSPLANT CLINIC    Mr. 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  He is doing well.  He has no specific complaints.  He walks 45 minutes to an hour or 7500-10,000 steps every day.  He does have lower extremity swelling on and off.  It is more during the daytime.  He is compliant with his medications.       PAST MEDICAL HISTORY:  Past Medical History:   Diagnosis Date     CAD (coronary artery disease)      CHF (congestive heart failure) (H)      CKD (chronic kidney disease), stage III (H)      Cortical cataract of both eyes      Diabetes (H)      Hyperlipidemia      Hypertension      Infection due to Streptococcus mitis group 09/23/2020     Ischemic cardiomyopathy      Obesity      CHANTEL (obstructive sleep apnea)     occas cpap     CHANTEL (obstructive sleep apnea)- severe (AHI 30)      Osteoarthritis          CURRENT MEDICATIONS:  Current Outpatient Medications   Medication Sig     acetaminophen (TYLENOL) 325 MG tablet Take 3 tablets (975 mg) by mouth every 8 hours as needed for mild pain     Alcohol Swabs PADS Use to swab the area of the injection or sergio as directed   Per insurance  coverage     aspirin (ASA) 81 MG chewable tablet Take 1 tablet (81 mg) by mouth daily     atropine 1 % ophthalmic solution Place 1 drop Into the left eye daily     blood glucose (NO BRAND SPECIFIED) test strip Use to test blood sugar 4 times daily or as directed.     blood glucose monitoring (ACCU-CHEK FELIPE SMARTVIEW) meter device kit Use to test blood sugar 3-4 times daily, as directed.     blood glucose monitoring (SOFTCLIX) lancets 1 each 4 times daily Use to test blood sugars 2 times daily.     Calcium Carb-Cholecalciferol (CALCIUM CARBONATE-VITAMIN D3) 600-400 MG-UNIT TABS TAKE ONE TABLET BY MOUTH TWICE A DAY WITH MEALS     Continuous Blood Gluc Sensor (FREESTYLE JEREMY 14 DAY SENSOR) MISC Change every 14 days.     Continuous Blood Gluc Sensor (FREESTYLE JEREMY 14 DAY SENSOR) MISC Change every 14 days.     Dulaglutide (TRULICITY) 3 MG/0.5ML SOPN Inject 3 mg Subcutaneous once a week     furosemide (LASIX) 20 MG tablet Take 1 tablet (20 mg) by mouth daily     HUMALOG KWIKPEN 100 UNIT/ML soln Inject 0-31 Units Subcutaneous 3 times daily (with meals) + Custom MEAL and SNACK corrective dosing   Approx 90 units daily max     hydrALAZINE (APRESOLINE) 100 MG tablet Take 1 tablet (100 mg) by mouth every 8 hours     insulin aspart (NOVOLOG PEN) 100 UNIT/ML pen Inject 0-31 Units Subcutaneous 3 times daily (with meals) Custom MEAL and SNACK corrective dosing     BG less than 80, do not give Novolog.  BG , give  15 units.  -169, give  17 units.  -199 give 19 units.  -229 give 21 units.  -259 give 23 units.  -289 give 25 units.  -319 give 27 units.  -349 give 29 units.  BG >/= 350 give 31 units.  To be given with meal based on pre-meal blood glucose     insulin  UNIT/ML injection GIve 40 units each morning and 12 units each evening     insulin  UNIT/ML injection Inject 35 Units Subcutaneous every morning     insulin pen needle (32G X 4 MM) 32G X 4 MM miscellaneous  Use 5-6 pen needles daily or as directed.     latanoprost (XALATAN) 0.005 % ophthalmic solution Place 1 drop into both eyes At Bedtime     magnesium oxide (MAG-OX) 400 (241.3 Mg) MG tablet TAKE ONE TABLET BY MOUTH TWICE A DAY     neomycin-polymixin-dexamethasone (MAXITROL) 0.1 % ophthalmic suspension Apply 1 drop to eye 4 times daily Instill into operative eye(s) per physician instructions.     neomycin-polymixin-dexamethasone (MAXITROL) ophthalmic ointment Place 1 Application Into the left eye At Bedtime      order for DME Auto CPAP 8-15 cmH20     pantoprazole (PROTONIX) 40 MG EC tablet      prednisoLONE acetate (PRED FORTE) 1 % ophthalmic suspension Place 1 drop Into the left eye daily     prednisoLONE acetate (PRED FORTE) 1 % ophthalmic suspension Place 1-2 drops Into the left eye 2 times daily     rosuvastatin (CRESTOR) 10 MG tablet TAKE ONE TABLET BY MOUTH ONCE DAILY     senna-docusate (SENOKOT-S/PERICOLACE) 8.6-50 MG tablet Take 1 tablet by mouth 2 times daily as needed (hold for loose stools)     sirolimus (GENERIC EQUIVALENT) 0.5 MG tablet Take 4 tablets (2 mg) by mouth daily     sulfamethoxazole-trimethoprim (BACTRIM) 400-80 MG tablet Take 1 tablet by mouth three times a week     tacrolimus (GENERIC EQUIVALENT) 1 MG capsule Take 3 capsules (3 mg) by mouth every morning AND 3 capsules (3 mg) every evening.     tamsulosin (FLOMAX) 0.4 MG capsule TAKE ONE CAPSULE BY MOUTH ONCE DAILY     Current Facility-Administered Medications   Medication     bevacizumab (AVASTIN) intravitreal inj 1.25 mg     bevacizumab (AVASTIN) intravitreal inj 1.25 mg     Facility-Administered Medications Ordered in Other Visits   Medication     0.9% sodium chloride BOLUS     acetaminophen (TYLENOL) tablet 325 mg     atropine injection 0.5 mg     fentaNYL (PF) (SUBLIMAZE) injection 25 mcg     flumazenil (ROMAZICON) injection 0.2 mg     Hold: metformin and metformin containing medications on day of the procedure and for 48 hours after IV  "contrast given- Patients with acute kidney injury or severe chronic kidney disease (stage IV or stage V; i.e., eGFR less than 30)     lidocaine (LMX4) cream     lidocaine 1 % 0.1-1 mL     midazolam (VERSED) injection 0.5 mg     naloxone (NARCAN) injection 0.2 mg    Or     naloxone (NARCAN) injection 0.4 mg    Or     naloxone (NARCAN) injection 0.2 mg    Or     naloxone (NARCAN) injection 0.4 mg     oxyCODONE (ROXICODONE) tablet 5 mg    Or     oxyCODONE (ROXICODONE) tablet 10 mg     sodium chloride (PF) 0.9% PF flush 3 mL     sodium chloride (PF) 0.9% PF flush 3 mL         ROS:  See HPI    EXAM:  BP (!) 157/84 (BP Location: Left arm, Patient Position: Sitting, Cuff Size: Adult Regular)   Pulse 94   Ht 1.671 m (5' 5.8\")   Wt 83.7 kg (184 lb 9.6 oz)   SpO2 97%   BMI 29.98 kg/m    He was awake, alert, oriented x3.  He was comfortable.  He was in no apparent distress.  He had no pallor, cyanosis or jaundice.  His neck exam revealed no jugular venous distention.  His carotids were 2+ bilaterally.  His pulse was regular and rhythm.  Cardiac auscultation revealed normal S1 and S2 with no murmur rub or gallop.  Auscultation of the lungs revealed equal air entry on both sides.  His abdomen was soft with normal bowels with no tenderness no rigidity no guarding.  He had no focal neurological deficit.  His extremities showed no edema.      EKG (09/2022)  Sinus tachycardia   Possible Anterior infarct , age undetermined   Abnormal ECG   When compared with ECG of 20-JUL-2021 08:57,   Nonspecific T wave abnormality now evident in Anterior leads      Echo (03/2022):  Global and regional left ventricular function is normal with an EF of 60-65%.  Right ventricular function, chamber size, wall motion, and thickness are  normal.  Pulmonary artery systolic pressure cannot be assessed.  The inferior vena cava is normal.  No pericardial effusion is present.  No significant changes noted.     Coronary angiogram (09/2022):  LM -  " Normal  LAD - Normal  LCX - Normal  RCA - Normal    1. Angiographically normal coronary arteries. Proximal LAD PAULINO 0.2mm by IVUS.  2. Normal RHC including pressures and cardiac output.     RHC (09/2022):   Ao 157/78/111  RA --/--/9  RV 35/6  PA 34/16/21  PCWP 13  PA Sat 67.4%  Jacy CO/CI 5.6/2.9  TD CO/CI 5.9/3.1  SVR 1457 dynes  PVR 1.4 PERRY    DSA:     Lab Results   Component Value Date    [facility] 07/19/2021    [facility] 01/05/2021    SAICELL Class I 07/19/2021    SAICELL Class I 01/05/2021    [id number] None 07/19/2021    [id number] None 01/05/2021    [id number] None 07/19/2021    [id number] None 01/05/2021    SAIREPCOM  07/19/2021      Test performed by modified procedure. Serum heat inactivated and tested by a modified ([place]) protocol including fetal calf serum addition. High-risk, mfi >3,000. Mod-risk, mfi 500-3,000.    SAIREPCOM  01/05/2021      Test performed by modified procedure. Serum heat inactivated and tested   by a modified ([place]) protocol including fetal calf serum addition.   High-risk, mfi >3,000. Mod-risk, mfi 500-3,000.       Lab Results   Component Value Date    [facility] 07/19/2021    [facility] 01/05/2021    SAIICELL Class II 07/19/2021    SAIICELL Class II 01/05/2021    [id number] None 07/19/2021    [id number] None 01/05/2021    CZ0NDQDGPL DR:11 07/19/2021    [id number] None 01/05/2021    SAIIREPCOM  07/19/2021      Test performed by modified procedure. Serum heat inactivated and tested by a modified ([place]) protocol including fetal calf serum addition. High-risk, mfi >3,000. Mod-risk, mfi 500-3,000.    SAIIREPCOM  01/05/2021      Test performed by modified procedure. Serum heat inactivated and tested   by a modified ([place]) protocol including fetal calf serum addition.   High-risk, mfi >3,000. Mod-risk, mfi 500-3,000.         IMMUNOSUPPRESSANT LEVELS:  Lab Results   Component Value Date    TACROL 4.1 (L) 09/12/2022    TACROL 5.6 05/11/2021    DOSTAC  09/12/2022       Comment:      Last dose information not provided.    DOSTAC  7pm 5/10/21 05/11/2021    RAPAMY 5.0 03/14/2022       Lab Results   Component Value Date    CSPEC EDTA PLASMA 05/11/2021       CBC RESULTS:   Recent Labs   Lab Test 09/12/22  1129 09/12/22  0854   WBC  --  6.3   RBC  --  4.23*   HGB 10.5* 11.2*   HCT  --  36.0*   MCV  --  85   MCH  --  26.5   MCHC  --  31.1*   RDW  --  15.8*   PLT  --  210       Recent Labs   Lab Test 09/12/22  1250 09/12/22  0854 03/14/22  1014   NA  --  138 141   POTASSIUM  --  3.7 4.4   CHLORIDE  --  101 109   CO2  --  29 26   ANIONGAP  --  8 6   * 87 175*   BUN  --  24.1* 29   CR  --  1.75* 1.94*   BRYANNA  --  9.3 8.7       Liver Function Studies - Liver Function Studies -   Recent Labs   Lab Test 09/12/22  0854   PROTTOTAL 6.8   ALBUMIN 3.9   BILITOTAL 0.3   ALKPHOS 146*   AST 30   ALT 23       Recent Labs   Lab Test 09/12/22  0854 11/17/21  0848 07/06/16  0717 06/27/15  0755 01/11/15  1033   CHOL 115 127   < > 82 203*   HDL 32* 35*   < > 32* 34*   LDL 38 28   < > 31 Cannot estimate LDL when triglyceride exceeds 400 mg/dL  107   TRIG 223* 322*   < > 99 519*   CHOLHDLRATIO  --   --   --  2.6 6.0*    < > = values in this interval not displayed.         No components found for: CK  Lab Results   Component Value Date    MAG 1.8 09/12/2022    MAG 1.8 05/11/2021     Lab Results   Component Value Date    A1C 8.4 (H) 09/12/2022    A1C 6.7 (H) 01/14/2021     Lab Results   Component Value Date    PHOS 2.5 09/12/2022    PHOS 3.2 05/11/2021     PSA   Date Value Ref Range Status   07/10/2020 0.12 0 - 4 ug/L Final     Comment:     Assay Method:  Chemiluminescence using Siemens Vista analyzer     Prostate Specific Antigen Screen   Date Value Ref Range Status   09/12/2022 0.26 0.00 - 4.50 ng/mL Final   07/19/2021 0.17 0.00 - 4.00 ug/L Final        Assessment/Plan:  Mr. 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    #1 status post orthoptic heart transplantation - He is doing well.  He has normal graft function by echocardiogram in March.  We will get a repeat echocardiogram soon next week.  His right heart catheterization reveals normal biventricular filling pressure.  He has no evidence of allograft vasculopathy.  His IVUS shows stable intima media thickness.  He has no prior DSA.    He is on everolimus and tacrolimus combination therapy given his earlier primary graft dysfunction as well as to protect his kidneys.  He had chronic kidney disease at the time of transplant.  We will continue him on the combination therapy with a combined goal of 8-10. He is on aspirin and rosuvastatin.    #2 toxoplasma + serostatus - We will continue him on Bactrim 3 times a week given his low renal function.    #3 type 2 diabetes mellitus - Unfortunately his most recent hemoglobin A1c is elevated.  He is being managed by endocrinology.  He is on Trulicity and insulin.  We will discuss with his endocrinology team and do the necessary adjustment as needed for better blood sugar control.    #4 chronic kidney disease - Creatinine stable we will continue him on the combination of everolimus and tacrolimus with a lower back goal.      #5 hypertension - This is controlled on hydralazine 100 mg 3 times a day.  We will switch him to lisinopril 10 mg daily twice daily from hydralazine.  We will repeat a chemistry in 1 to 2 weeks to make sure his potassium and creatinine are stable.      He will return to see us [facility].  He will call us in the interim if any further worsening symptoms.  For his 30-month visit in January.  He will have an echocardiogram and    It was a pleasure meeting Merly Tee in our heart transplant clinic at [facility].  We thank you for involving us in his care.    Total time today was 40 minutes reviewing notes, imaging, labs, patient visit, orders and documentation         Arvin  MD Arvin   [facility]  Heart Failure, Transplant, and Mechanical Circulatory Support Cardiology   Cardiovascular Division  [facility]   [phone]          Please do not hesitate to contact me if you have any questions/concerns.     Sincerely,     Arvin Sheridan MD

## 2022-09-12 NOTE — PATIENT INSTRUCTIONS
Patient Instructions  1. STOP taking the Hydralazine.  2. START taking Lisinopril 10mg twice a day.  3. Have your labs checked on this Thursday after your Eye appointment.  4.  Call Chirstelle when you get your Lisinopril in the mail.    Next transplant clinic appointment:  in January for 30 month visit.  Next lab draw: This Friday  Coordinator will call with all pending results.     Please call transplant coordinator with any questions:    Christelle Pettit RN BSN   Post Heart Transplant Nurse Coordinator  [facility]  Questions: [phone]

## 2022-09-12 NOTE — DISCHARGE SUMMARY
Pt discharged to [facility] clinic for his 16:15 apt with Dr. Hogan. VSS on RA. Radial access site and RIJ site C/D/I, negative for a hematoma. Up ambulating and voiding w/o difficulty. PIV access removed. Tolerating PO intake. Discharge instructions reviewed and all questions answered. Pt and wife escorted to the front entrance where they are taking a shuttle to [facility] apt at 16:15.

## 2022-09-12 NOTE — DISCHARGE INSTRUCTIONS
Going Home after a Coronary Angiogram   ______________________________________________      After you go home:  Have an adult stay with you for 24 hours.  Drink plenty of fluids.  You may eat your normal diet, unless your doctor tells you otherwise.  For 24 hours:  Relax and take it easy.  Do NOT smoke.  Do NOT make any important or legal decisions.  Do NOT drive or operate machines at home or at work.  Do NOT drink alcohol.  Remove the Band-Aid after 24 hours. If there is minor oozing, apply another Band-aid and remove it after 12 hours.  For 2 days, do NOT have sex or do any heavy exercise.  Do NOT take a bath, or use a hot tub or pool for at least 3 days. You may shower.    Care of neck site: You may resume all normal activity. Monitor neck site for bleeding, swelling, or voice changes. If you notice bleeding or swelling immediately apply pressure to the site and call number below to speak with Cardiology Fellow.  If you experience any changes in your breathing you should call your doctor immediately or come to the closest Emergency Department.  Do not drive yourself.    Care of wrist or arm site  It is normal to have soreness at the puncture site and mild tingling in your hand for up to 3 days.  For 2 days, do not use your hand or arm to support your weight (such as rising from a chair) or bend your wrist (such as lifting a garage door).  For 2 days, do not lift more than 5 pounds or exercise your arm (tennis, golf or bowling).    If you start bleeding from the site in your arm:  Sit down and press firmly on the site with your fingers for 10 minutes. Call your doctor as soon as you can.  If the bleeding stops, sit still and keep your wrist straight for 2 hours.    Medicines  If you have started taking Plavix or Effient, do not stop taking it until you talk to your heart doctor (cardiologist).  If you are on metformin (Glucophage), do not restart it until you have blood tests (within 2 to 3 days after  discharge). When your doctor tells you it is safe, you may restart the metformin.  If you have stopped any other medicines, check with your nurse or provider about when to restart them.    Call 911 right away if you have bleeding that is heavy or does not stop.    Call your doctor if:  You have a large or growing hard lump around the site.  The site is red, swollen, hot or tender.  Blood or fluid is draining from the site.  You have chills or a fever greater than 101 F (38 C).  Your leg or arm feels numb or cool.  You have hives, a rash or unusual itching.    [facility] [place]:  [phone] (7 days a week)

## 2022-09-12 NOTE — PROGRESS NOTES
09/12/2022    ADULT HEART TRANSPLANT CLINIC    Mr. 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  He is doing well.  He has no specific complaints.  He walks 45 minutes to an hour or 7500-10,000 steps every day.  He does have lower extremity swelling on and off.  It is more during the daytime.  He is compliant with his medications.       PAST MEDICAL HISTORY:  Past Medical History:   Diagnosis Date     CAD (coronary artery disease)      CHF (congestive heart failure) (H)      CKD (chronic kidney disease), stage III (H)      Cortical cataract of both eyes      Diabetes (H)      Hyperlipidemia      Hypertension      Infection due to Streptococcus mitis group 09/23/2020     Ischemic cardiomyopathy      Obesity      CHANTEL (obstructive sleep apnea)     occas cpap     CHANTEL (obstructive sleep apnea)- severe (AHI 30)      Osteoarthritis          CURRENT MEDICATIONS:  Current Outpatient Medications   Medication Sig     acetaminophen (TYLENOL) 325 MG tablet Take 3 tablets (975 mg) by mouth every 8 hours as needed for mild pain     Alcohol Swabs PADS Use to swab the area of the injection or sergio as directed   Per insurance coverage     aspirin (ASA) 81 MG chewable tablet Take 1 tablet (81 mg) by mouth daily     atropine 1 % ophthalmic solution Place 1 drop Into the left eye daily     blood glucose (NO BRAND SPECIFIED) test strip Use to test blood sugar 4 times daily or as directed.     blood glucose monitoring (ACCU-CHEK FELIPE SMARTVIEW) meter device kit Use to test blood  sugar 3-4 times daily, as directed.     blood glucose monitoring (SOFTCLIX) lancets 1 each 4 times daily Use to test blood sugars 2 times daily.     Calcium Carb-Cholecalciferol (CALCIUM CARBONATE-VITAMIN D3) 600-400 MG-UNIT TABS TAKE ONE TABLET BY MOUTH TWICE A DAY WITH MEALS     Continuous Blood Gluc Sensor (FREESTYLE JEREMY 14 DAY SENSOR) MISC Change every 14 days.     Continuous Blood Gluc Sensor (FREESTYLE JEREMY 14 DAY SENSOR) MISC Change every 14 days.     Dulaglutide (TRULICITY) 3 MG/0.5ML SOPN Inject 3 mg Subcutaneous once a week     furosemide (LASIX) 20 MG tablet Take 1 tablet (20 mg) by mouth daily     HUMALOG KWIKPEN 100 UNIT/ML soln Inject 0-31 Units Subcutaneous 3 times daily (with meals) + Custom MEAL and SNACK corrective dosing   Approx 90 units daily max     hydrALAZINE (APRESOLINE) 100 MG tablet Take 1 tablet (100 mg) by mouth every 8 hours     insulin aspart (NOVOLOG PEN) 100 UNIT/ML pen Inject 0-31 Units Subcutaneous 3 times daily (with meals) Custom MEAL and SNACK corrective dosing     BG less than 80, do not give Novolog.  BG , give  15 units.  -169, give  17 units.  -199 give 19 units.  -229 give 21 units.  -259 give 23 units.  -289 give 25 units.  -319 give 27 units.  -349 give 29 units.  BG >/= 350 give 31 units.  To be given with meal based on pre-meal blood glucose     insulin  UNIT/ML injection GIve 40 units each morning and 12 units each evening     insulin  UNIT/ML injection Inject 35 Units Subcutaneous every morning     insulin pen needle (32G X 4 MM) 32G X 4 MM miscellaneous Use 5-6 pen needles daily or as directed.     latanoprost (XALATAN) 0.005 % ophthalmic solution Place 1 drop into both eyes At Bedtime     magnesium oxide (MAG-OX) 400 (241.3 Mg) MG tablet TAKE ONE TABLET BY MOUTH TWICE A DAY     neomycin-polymixin-dexamethasone (MAXITROL) 0.1 % ophthalmic suspension Apply 1 drop to eye 4 times daily Instill into  operative eye(s) per physician instructions.     neomycin-polymixin-dexamethasone (MAXITROL) ophthalmic ointment Place 1 Application Into the left eye At Bedtime      order for DME Auto CPAP 8-15 cmH20     pantoprazole (PROTONIX) 40 MG EC tablet      prednisoLONE acetate (PRED FORTE) 1 % ophthalmic suspension Place 1 drop Into the left eye daily     prednisoLONE acetate (PRED FORTE) 1 % ophthalmic suspension Place 1-2 drops Into the left eye 2 times daily     rosuvastatin (CRESTOR) 10 MG tablet TAKE ONE TABLET BY MOUTH ONCE DAILY     senna-docusate (SENOKOT-S/PERICOLACE) 8.6-50 MG tablet Take 1 tablet by mouth 2 times daily as needed (hold for loose stools)     sirolimus (GENERIC EQUIVALENT) 0.5 MG tablet Take 4 tablets (2 mg) by mouth daily     sulfamethoxazole-trimethoprim (BACTRIM) 400-80 MG tablet Take 1 tablet by mouth three times a week     tacrolimus (GENERIC EQUIVALENT) 1 MG capsule Take 3 capsules (3 mg) by mouth every morning AND 3 capsules (3 mg) every evening.     tamsulosin (FLOMAX) 0.4 MG capsule TAKE ONE CAPSULE BY MOUTH ONCE DAILY     Current Facility-Administered Medications   Medication     bevacizumab (AVASTIN) intravitreal inj 1.25 mg     bevacizumab (AVASTIN) intravitreal inj 1.25 mg     Facility-Administered Medications Ordered in Other Visits   Medication     0.9% sodium chloride BOLUS     acetaminophen (TYLENOL) tablet 325 mg     atropine injection 0.5 mg     fentaNYL (PF) (SUBLIMAZE) injection 25 mcg     flumazenil (ROMAZICON) injection 0.2 mg     Hold: metformin and metformin containing medications on day of the procedure and for 48 hours after IV contrast given- Patients with acute kidney injury or severe chronic kidney disease (stage IV or stage V; i.e., eGFR less than 30)     lidocaine (LMX4) cream     lidocaine 1 % 0.1-1 mL     midazolam (VERSED) injection 0.5 mg     naloxone (NARCAN) injection 0.2 mg    Or     naloxone (NARCAN) injection 0.4 mg    Or     naloxone (NARCAN) injection 0.2  "mg    Or     naloxone (NARCAN) injection 0.4 mg     oxyCODONE (ROXICODONE) tablet 5 mg    Or     oxyCODONE (ROXICODONE) tablet 10 mg     sodium chloride (PF) 0.9% PF flush 3 mL     sodium chloride (PF) 0.9% PF flush 3 mL         ROS:  See HPI    EXAM:  BP (!) 157/84 (BP Location: Left arm, Patient Position: Sitting, Cuff Size: Adult Regular)   Pulse 94   Ht 1.671 m (5' 5.8\")   Wt 83.7 kg (184 lb 9.6 oz)   SpO2 97%   BMI 29.98 kg/m    He was awake, alert, oriented x3.  He was comfortable.  He was in no apparent distress.  He had no pallor, cyanosis or jaundice.  His neck exam revealed no jugular venous distention.  His carotids were 2+ bilaterally.  His pulse was regular and rhythm.  Cardiac auscultation revealed normal S1 and S2 with no murmur rub or gallop.  Auscultation of the lungs revealed equal air entry on both sides.  His abdomen was soft with normal bowels with no tenderness no rigidity no guarding.  He had no focal neurological deficit.  His extremities showed no edema.      EKG (09/2022)  Sinus tachycardia   Possible Anterior infarct , age undetermined   Abnormal ECG   When compared with ECG of 20-JUL-2021 08:57,   Nonspecific T wave abnormality now evident in Anterior leads      Echo (03/2022):  Global and regional left ventricular function is normal with an EF of 60-65%.  Right ventricular function, chamber size, wall motion, and thickness are  normal.  Pulmonary artery systolic pressure cannot be assessed.  The inferior vena cava is normal.  No pericardial effusion is present.  No significant changes noted.     Coronary angiogram (09/2022):  LM -  Normal  LAD - Normal  LCX - Normal  RCA - Normal    1. Angiographically normal coronary arteries. Proximal LAD PAULINO 0.2mm by IVUS.  2. Normal RHC including pressures and cardiac output.     RHC (09/2022):   Ao 157/78/111  RA --/--/9  RV 35/6  PA 34/16/21  PCWP 13  PA Sat 67.4%  Jacy CO/CI 5.6/2.9  TD CO/CI 5.9/3.1  SVR 1457 dynes  PVR 1.4 PERRY    DSA:   "   Lab Results   Component Value Date    SAITESTMET [facility] 07/19/2021    SAITESTMET [facility] 01/05/2021    SAICELL Class I 07/19/2021    SAICELL Class I 01/05/2021    [id number] None 07/19/2021    VL1ZQORSJ None 01/05/2021    HK9CRQEZNP None 07/19/2021    RJ1CMSWJKJ None 01/05/2021    SAIREPCOM  07/19/2021      Test performed by modified procedure. Serum heat inactivated and tested by a modified ([place]) protocol including fetal calf serum addition. High-risk, mfi >3,000. Mod-risk, mfi 500-3,000.    SAIREPCOM  01/05/2021      Test performed by modified procedure. Serum heat inactivated and tested   by a modified ([place]) protocol including fetal calf serum addition.   High-risk, mfi >3,000. Mod-risk, mfi 500-3,000.       Lab Results   Component Value Date    SAIITESTME [facility] 07/19/2021    SAIITESTME [facility] 01/05/2021    SAIICELL Class II 07/19/2021    SAIICELL Class II 01/05/2021    [id number] None 07/19/2021    LF0UENPXG None 01/05/2021    NU3QCTYBDP DR:11 07/19/2021    [id number] None 01/05/2021    SAIIREPCOM  07/19/2021      Test performed by modified procedure. Serum heat inactivated and tested by a modified ([place]) protocol including fetal calf serum addition. High-risk, mfi >3,000. Mod-risk, mfi 500-3,000.    SAIIREPCOM  01/05/2021      Test performed by modified procedure. Serum heat inactivated and tested   by a modified ([place]) protocol including fetal calf serum addition.   High-risk, mfi >3,000. Mod-risk, mfi 500-3,000.         IMMUNOSUPPRESSANT LEVELS:  Lab Results   Component Value Date    TACROL 4.1 (L) 09/12/2022    TACROL 5.6 05/11/2021    DOSTAC  09/12/2022      Comment:      Last dose information not provided.    DOSTAC  7pm 5/10/21 05/11/2021    RAPAMY 5.0 03/14/2022       Lab Results   Component Value Date    CSPEC EDTA PLASMA 05/11/2021       CBC RESULTS:   Recent Labs   Lab Test 09/12/22  1129 09/12/22  0854   WBC  --  6.3   RBC  --  4.23*   HGB 10.5* 11.2*   HCT  --  36.0*   MCV  --  85   MCH  --   26.5   MCHC  --  31.1*   RDW  --  15.8*   PLT  --  210       Recent Labs   Lab Test 09/12/22  1250 09/12/22  0854 03/14/22  1014   NA  --  138 141   POTASSIUM  --  3.7 4.4   CHLORIDE  --  101 109   CO2  --  29 26   ANIONGAP  --  8 6   * 87 175*   BUN  --  24.1* 29   CR  --  1.75* 1.94*   BRYANNA  --  9.3 8.7       Liver Function Studies - Liver Function Studies -   Recent Labs   Lab Test 09/12/22  0854   PROTTOTAL 6.8   ALBUMIN 3.9   BILITOTAL 0.3   ALKPHOS 146*   AST 30   ALT 23       Recent Labs   Lab Test 09/12/22  0854 11/17/21  0848 07/06/16  0717 06/27/15  0755 01/11/15  1033   CHOL 115 127   < > 82 203*   HDL 32* 35*   < > 32* 34*   LDL 38 28   < > 31 Cannot estimate LDL when triglyceride exceeds 400 mg/dL  107   TRIG 223* 322*   < > 99 519*   CHOLHDLRATIO  --   --   --  2.6 6.0*    < > = values in this interval not displayed.         No components found for: CK  Lab Results   Component Value Date    MAG 1.8 09/12/2022    MAG 1.8 05/11/2021     Lab Results   Component Value Date    A1C 8.4 (H) 09/12/2022    A1C 6.7 (H) 01/14/2021     Lab Results   Component Value Date    PHOS 2.5 09/12/2022    PHOS 3.2 05/11/2021     PSA   Date Value Ref Range Status   07/10/2020 0.12 0 - 4 ug/L Final     Comment:     Assay Method:  Chemiluminescence using Siemens Vista analyzer     Prostate Specific Antigen Screen   Date Value Ref Range Status   09/12/2022 0.26 0.00 - 4.50 ng/mL Final   07/19/2021 0.17 0.00 - 4.00 ug/L Final        Assessment/Plan:  Mr. 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    #1 status post orthoptic heart transplantation - He is doing well.  He has normal graft function by echocardiogram in March.  We will get a repeat echocardiogram soon next week.  His right heart catheterization reveals normal biventricular filling pressure.  He has no evidence of allograft vasculopathy.  His IVUS shows stable intima media thickness.   He has no prior DSA.    He is on everolimus and tacrolimus combination therapy given his earlier primary graft dysfunction as well as to protect his kidneys.  He had chronic kidney disease at the time of transplant.  We will continue him on the combination therapy with a combined goal of 8-10. He is on aspirin and rosuvastatin.    #2 toxoplasma + serostatus - We will continue him on Bactrim 3 times a week given his low renal function.    #3 type 2 diabetes mellitus - Unfortunately his most recent hemoglobin A1c is elevated.  He is being managed by endocrinology.  He is on Trulicity and insulin.  We will discuss with his endocrinology team and do the necessary adjustment as needed for better blood sugar control.    #4 chronic kidney disease - Creatinine stable we will continue him on the combination of everolimus and tacrolimus with a lower back goal.      #5 hypertension - This is controlled on hydralazine 100 mg 3 times a day.  We will switch him to lisinopril 10 mg daily twice daily from hydralazine.  We will repeat a chemistry in 1 to 2 weeks to make sure his potassium and creatinine are stable.      He will return to see us AlloMap.  He will call us in the interim if any further worsening symptoms.  For his 30-month visit in January.  He will have an echocardiogram and    It was a pleasure meeting Merly Tee in our heart transplant clinic at [facility].  We thank you for involving us in his care.    Total time today was 40 minutes reviewing notes, imaging, labs, patient visit, orders and documentation         Arvin Sheridan MD   [facility]  Heart Failure, Transplant, and Mechanical Circulatory Support Cardiology   Cardiovascular Division  [facility]   [phone]

## 2022-09-12 NOTE — PROGRESS NOTES
D/I/A: Manual pressure held for 10 minutes to RIJ.  Sheath, size 7 Fr,  pulled by AURORA Izaguirre.  Site CDI, no hematoma.  Stasis achieved at 1430.  A+O x4 and making needs known.  Denies pain or sob.  VSSA.  Tolerated sheath removal well.  P: Continue to monitor status.  Discharge to home once meeting criteria.

## 2022-09-12 NOTE — LETTER
Date:September 20, 2022      Patient was self referred, no letter generated. Do not send.        [facility] Health Information

## 2022-09-12 NOTE — NURSING NOTE
Transplant Coordinator Note    Reason for visit: 2nd annual  Coordinator: Present   Caregiver:  wife    Health concerns addressed today:  1.BP still elevated- changed from Hydralazine to Lisinopril.  2.   3.       Immunosuppressants:  Tacro 3/3- Goal 3-5  Rapa 2mg daily- Goal 3-5 (combined goal of 8-10)    Routine screenings:    Derm: UTD- due this Winter  Dental: DUE  Colonoscopy: DUE- will send referral  Breast/Prostate:PSA normal  Eye: UTD  Flu/Pneumonia: Covid x3- getting 4th booster today, will get flu shot, pnuemonia done    Labs:       Additional Notes:         Angio/labs, RHC reviewed with patient  Medication record reviewed and reconciled  Questions and concerns addressed  Pt verbalized an understanding of plan of care.     Patient Instructions  1. STOP taking the Hydralazine.  2. START taking Lisinopril 10mg twice a day.  3. Have your labs checked on this Thursday after your Eye appointment.  4.  Call Christelle when you get your Lisinopril in the mail.    Next transplant clinic appointment:  in January for 30 month visit.  Next lab draw: This Friday  Coordinator will call with all pending results.     Please call transplant coordinator with any questions:    Christelle Pettit RN BSN   Post Heart Transplant Nurse Coordinator  [facility]  Questions: [phone]

## 2022-09-12 NOTE — Clinical Note
Oxygen saturations documented through [facility].   Routing to Dr Mickey Moreira. Dorenda Bumpers., who saw pt yesterday for review.

## 2022-09-12 NOTE — PROGRESS NOTES
Paged Aleta Hernandez:    Clarifying if with Angiomax if deflation time of TR band is two hours vs one hour? Also need orders to removed the RIJ sheath and what the parameters are for that.     Orders placed at 1305: Deflate TR band in two hours and remove RIJ sheath in two hours.

## 2022-09-12 NOTE — PROGRESS NOTES
D/I/A: Pt roomed on 2A in room 11.  Arrived via litter and accompanied by CCL RN. On/Off: On monitor.  VSSA.  Rhythm upon arrival NSR on monitor.  Denies pain or sob.  Reviewed activity restrictions and when to notify RN, ie-changes to breathing or increased chest pressure or chest pain.  CCL access:  RIJ site with 7Fr sheath in place. Can remove sheath at 1420 per handoff report. Right radial access with 12cc of air placed at 1220. Can start to deflate at 1420.  P: Continue to monitor status.  Discharge to home once meeting criteria.

## 2022-09-13 LAB
ATRIAL RATE - MUSE: 102 BPM
DIASTOLIC BLOOD PRESSURE - MUSE: NORMAL MMHG
DONOR IDENTIFICATION: NORMAL
DSA COMMENTS: NORMAL
DSA PRESENT: NO
DSA TEST METHOD: NORMAL
INTERPRETATION ECG - MUSE: NORMAL
ORGAN: NORMAL
P AXIS - MUSE: 20 DEGREES
PATH REPORT.COMMENTS IMP SPEC: NORMAL
PATH REPORT.FINAL DX SPEC: NORMAL
PATH REPORT.GROSS SPEC: NORMAL
PATH REPORT.MICROSCOPIC SPEC OTHER STN: NORMAL
PATH REPORT.RELEVANT HX SPEC: NORMAL
PHOTO IMAGE: NORMAL
PR INTERVAL - MUSE: 120 MS
QRS DURATION - MUSE: 92 MS
QT - MUSE: 354 MS
QTC - MUSE: 461 MS
R AXIS - MUSE: -16 DEGREES
SA 1 CELL: NORMAL
SA 1 TEST METHOD: NORMAL
SA 2 CELL: NORMAL
SA 2 TEST METHOD: NORMAL
SA1 HI RISK ABY: NORMAL
SA1 MOD RISK ABY: NORMAL
SA2 HI RISK ABY: NORMAL
SA2 MOD RISK ABY: NORMAL
SYSTOLIC BLOOD PRESSURE - MUSE: NORMAL MMHG
T AXIS - MUSE: 28 DEGREES
UNACCEPTABLE ANTIGENS: NORMAL
UNOS CPRA: 0
VENTRICULAR RATE- MUSE: 102 BPM
ZZZSA 1  COMMENTS: NORMAL
ZZZSA 2 COMMENTS: NORMAL

## 2022-09-13 PROCEDURE — 88350 IMFLUOR EA ADDL 1ANTB STN PX: CPT | Mod: 26 | Performed by: PATHOLOGY

## 2022-09-13 PROCEDURE — 88307 TISSUE EXAM BY PATHOLOGIST: CPT | Mod: 26 | Performed by: PATHOLOGY

## 2022-09-13 PROCEDURE — 88346 IMFLUOR 1ST 1ANTB STAIN PX: CPT | Mod: 26 | Performed by: PATHOLOGY

## 2022-09-14 LAB — IMMUKNOW IMMUNE CELL FUNCTION: 446 NG/ML

## 2022-09-15 ENCOUNTER — TELEPHONE (OUTPATIENT)
Dept: TRANSPLANT | Facility: CLINIC | Age: 67
End: 2022-09-15

## 2022-09-15 ENCOUNTER — OFFICE VISIT (OUTPATIENT)
Dept: OPHTHALMOLOGY | Facility: CLINIC | Age: 67
End: 2022-09-15
Attending: OPHTHALMOLOGY
Payer: COMMERCIAL

## 2022-09-15 ENCOUNTER — LAB (OUTPATIENT)
Dept: LAB | Facility: CLINIC | Age: 67
End: 2022-09-15

## 2022-09-15 DIAGNOSIS — E11.3513 TYPE 2 DIABETES MELLITUS WITH PROLIFERATIVE RETINOPATHY OF BOTH EYES AND MACULAR EDEMA, UNSPECIFIED WHETHER LONG TERM INSULIN USE (H): Primary | ICD-10-CM

## 2022-09-15 DIAGNOSIS — D75.829 HIT (HEPARIN-INDUCED THROMBOCYTOPENIA) (H): ICD-10-CM

## 2022-09-15 LAB
ALLOMAP SCORE (EXTERNAL): 33 (ref 0–40)
EBV DNA COPIES/ML, INSTRUMENT: 1543 COPIES/ML
EBV DNA SPEC NAA+PROBE-LOG#: 3.2 {LOG_COPIES}/ML
ERYTHROCYTE [DISTWIDTH] IN BLOOD BY AUTOMATED COUNT: 15.6 % (ref 10–15)
HCT VFR BLD AUTO: 34 % (ref 40–53)
HGB BLD-MCNC: 10.7 G/DL (ref 13.3–17.7)
MCH RBC QN AUTO: 26.8 PG (ref 26.5–33)
MCHC RBC AUTO-ENTMCNC: 31.5 G/DL (ref 31.5–36.5)
MCV RBC AUTO: 85 FL (ref 78–100)
NEGATIVE PREDICTIVE VALUE PERCENT (EXTERNAL): 99.1 %
PLATELET # BLD AUTO: 197 10E3/UL (ref 150–450)
POSITIVE PREDICTIVE VALUE PERCENT (EXTERNAL): 3.8 %
RBC # BLD AUTO: 4 10E6/UL (ref 4.4–5.9)
WBC # BLD AUTO: 5.3 10E3/UL (ref 4–11)

## 2022-09-15 PROCEDURE — 250N000011 HC RX IP 250 OP 636: Performed by: OPHTHALMOLOGY

## 2022-09-15 PROCEDURE — 36415 COLL VENOUS BLD VENIPUNCTURE: CPT | Performed by: PATHOLOGY

## 2022-09-15 PROCEDURE — G0463 HOSPITAL OUTPT CLINIC VISIT: HCPCS | Mod: 25

## 2022-09-15 PROCEDURE — 67028 INJECTION EYE DRUG: CPT | Mod: RT | Performed by: OPHTHALMOLOGY

## 2022-09-15 PROCEDURE — 92134 CPTRZ OPH DX IMG PST SGM RTA: CPT | Performed by: OPHTHALMOLOGY

## 2022-09-15 PROCEDURE — 85027 COMPLETE CBC AUTOMATED: CPT | Performed by: PATHOLOGY

## 2022-09-15 PROCEDURE — 92012 INTRM OPH EXAM EST PATIENT: CPT | Mod: 24 | Performed by: OPHTHALMOLOGY

## 2022-09-15 RX ADMIN — Medication 1.25 MG: at 08:23

## 2022-09-15 ASSESSMENT — CONF VISUAL FIELD
OS_SUPERIOR_NASAL_RESTRICTION: 3
OD_INFERIOR_NASAL_RESTRICTION: 3
OS_INFERIOR_TEMPORAL_RESTRICTION: 2
OS_INFERIOR_NASAL_RESTRICTION: 2
OD_SUPERIOR_TEMPORAL_RESTRICTION: 3
OD_INFERIOR_TEMPORAL_RESTRICTION: 3
OD_SUPERIOR_NASAL_RESTRICTION: 3
OS_SUPERIOR_TEMPORAL_RESTRICTION: 3

## 2022-09-15 ASSESSMENT — TONOMETRY
OD_IOP_MMHG: 17
IOP_METHOD: TONOPEN
OS_IOP_MMHG: 14

## 2022-09-15 ASSESSMENT — VISUAL ACUITY
METHOD: SNELLEN - LINEAR
OD_SC: 20/70
OS_SC: CF @ 2'

## 2022-09-15 ASSESSMENT — CUP TO DISC RATIO: OD_RATIO: 0.25

## 2022-09-15 ASSESSMENT — SLIT LAMP EXAM - LIDS
COMMENTS: NORMAL
COMMENTS: NORMAL

## 2022-09-15 ASSESSMENT — EXTERNAL EXAM - LEFT EYE: OS_EXAM: NORMAL

## 2022-09-15 ASSESSMENT — EXTERNAL EXAM - RIGHT EYE: OD_EXAM: NORMAL

## 2022-09-15 NOTE — PROGRESS NOTES
CC: Follow up proliferative diabetic retinopathy and slowly clearing vitreous hemorrhage right eye     Interval: Patient reports stable vision in both eyes since last visit. He denies new flashes of light or floaters in the right eye.   Lab Results   Component Value Date    A1C 7.4 03/14/2022    A1C 7.3 11/17/2021       HPI: 66 year old man PMHx of CAD (s/p 3v CABG 2008), ischemic CM, now s/p orthotopic heart transplant 07/2020, CKD, HTN, HLD, obesity, CHANTEL and IDDM2 with history of PDR both eyes who presented to [facility] in 05/17/2019 with bilateral vitreous hemorrhages.     His left eye is s/p PPV/MP/retinectomy 12/21/20. 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   There was NVI 06/02/2022    Eye drops  Latanoprost at bedtime both eyes   PredForte every other day left eye    RETINAL IMAGING  Optical Coherence Tomography: 08/04/2022 vs 06/02/2022  Right eye: interval resolution in central IR  Left eye: Irregular retina; ERM; nasal to fovea with large bullous edema, temporal with subretinal fibrosis / trace edema, Atrophic thinned retina IT macula. = stable     FA 1/19/22  right eye: PRP scars. Foci of NV superonasal.     Assessment and Plan  # Proliferative Diabetic Retinopathy, right eye    # Vitreous hemorrhage, right eye -  Resolving (onset 4/2021)   - Responded to multiple Avastin, nearly resolved 08/04/2022   - last Avastin 08/04/22 (6 week interval)    - R/B/A Fill-in PRP right eye 08/24/2022: #764 spots     - RTC 09/14/2022 for Avastin right eye     # Proliferative Diabetic Retinopathy, left eye     - New NVI, left eye 6/2/2022   - Gonio 6/2/2022 broad PAS superiorly ~3 clock hours, narrow PAS inferiorly <1 clock hour, no NVA   - Regressing 08/04/2022   - IOP 08/04/2022 15   - s/p PRP 6/5/2019   - s/p avastin last injection 06/02/2022   - Observe today, return to clinic 6 weeks possible Avastin left eye      # Funnel RD left eye   - progressed to funnel RD after loss to follow up given  heart surgeries     - w retina folded on itself under SO   - guarded prognosis discussed   - Continue PredForte every other day left eye    # Ocuar HTN, both eyes    - Continue Latanoprost at bedtime both eyes    - IOP wnl     # Visually significant cataract right eye    - IOL calcs obtained and A-scan completed 3/10/2022   - improve CME prior to CEIOL    - CE/IOL agreed 06/02/2022, now pending scheduling   - Schedule surgery    RTC 8 weeks Avastin right eye    ~~~~~~~~~~~~~~~~~~~~~~~~~~~~~~~~~~   Complete documentation of historical and exam elements from today's encounter can be found in the full encounter summary report (not reduplicated in this progress note).  I personally obtained the chief complaint(s) and history of present illness.  I confirmed and edited as necessary the review of systems, past medical/surgical history, family history, social history, and examination findings as documented by others; and I examined the patient myself.  I personally reviewed the relevant tests, images, and reports as documented above.  I formulated and edited as necessary the assessment and plan and discussed the findings and management plan with the patient and family and No resident or fellow assisted with the procedures performed.  I performed the procedures myself.    Triny Bunch MD  Professor of Ophthalmology.  Retina Service   Department of Ophthalmology and Visual Neurosciences   [facility]  Phone: [phone]   Fax: [phone]

## 2022-09-15 NOTE — TELEPHONE ENCOUNTER
Pt called using .  Heart biopsy negative, Allomap 33, Immuknow with mod immune cell response.  Pt to call when he receives his Lisinipril- plan for lab recheck one week after starting.  Pt states understanding plan of care and instructions.

## 2022-09-21 DIAGNOSIS — Z94.1 HEART REPLACED BY TRANSPLANT (H): Primary | ICD-10-CM

## 2022-09-26 ENCOUNTER — HOSPITAL ENCOUNTER (OUTPATIENT)
Dept: MRI IMAGING | Facility: CLINIC | Age: 67
Discharge: HOME | End: 2022-09-26
Attending: INTERNAL MEDICINE | Admitting: INTERNAL MEDICINE
Payer: COMMERCIAL

## 2022-09-26 VITALS
OXYGEN SATURATION: 97 % | HEART RATE: 102 BPM | SYSTOLIC BLOOD PRESSURE: 151 MMHG | DIASTOLIC BLOOD PRESSURE: 76 MMHG | RESPIRATION RATE: 16 BRPM

## 2022-09-26 DIAGNOSIS — Z94.1 HEART REPLACED BY TRANSPLANT (H): ICD-10-CM

## 2022-09-26 PROCEDURE — 93010 ELECTROCARDIOGRAM REPORT: CPT | Mod: 76 | Performed by: INTERNAL MEDICINE

## 2022-09-26 PROCEDURE — 75563 CARD MRI W/STRESS IMG & DYE: CPT | Mod: 26 | Performed by: RADIOLOGY

## 2022-09-26 PROCEDURE — 75563 CARD MRI W/STRESS IMG & DYE: CPT

## 2022-09-26 PROCEDURE — 250N000011 HC RX IP 250 OP 636: Performed by: RADIOLOGY

## 2022-09-26 PROCEDURE — 93016 CV STRESS TEST SUPVJ ONLY: CPT | Performed by: RADIOLOGY

## 2022-09-26 PROCEDURE — 93005 ELECTROCARDIOGRAM TRACING: CPT

## 2022-09-26 PROCEDURE — 255N000002 HC RX 255 OP 636: Performed by: INTERNAL MEDICINE

## 2022-09-26 PROCEDURE — 93018 CV STRESS TEST I&R ONLY: CPT | Performed by: RADIOLOGY

## 2022-09-26 PROCEDURE — A9585 GADOBUTROL INJECTION: HCPCS | Performed by: INTERNAL MEDICINE

## 2022-09-26 RX ORDER — GADOBUTROL 604.72 MG/ML
10 INJECTION INTRAVENOUS ONCE
Status: COMPLETED | OUTPATIENT
Start: 2022-09-26 | End: 2022-09-26

## 2022-09-26 RX ORDER — REGADENOSON 0.08 MG/ML
0.4 INJECTION, SOLUTION INTRAVENOUS ONCE
Status: COMPLETED | OUTPATIENT
Start: 2022-09-26 | End: 2022-09-26

## 2022-09-26 RX ORDER — DIAZEPAM 5 MG
5 TABLET ORAL EVERY 30 MIN PRN
Status: DISCONTINUED | OUTPATIENT
Start: 2022-09-26 | End: 2022-09-27 | Stop reason: HOSPADM

## 2022-09-26 RX ORDER — DIPHENHYDRAMINE HYDROCHLORIDE 50 MG/ML
25-50 INJECTION INTRAMUSCULAR; INTRAVENOUS
Status: DISCONTINUED | OUTPATIENT
Start: 2022-09-26 | End: 2022-09-27 | Stop reason: HOSPADM

## 2022-09-26 RX ORDER — DIPHENHYDRAMINE HCL 25 MG
25 CAPSULE ORAL
Status: DISCONTINUED | OUTPATIENT
Start: 2022-09-26 | End: 2022-09-27 | Stop reason: HOSPADM

## 2022-09-26 RX ORDER — METHYLPREDNISOLONE SODIUM SUCCINATE 125 MG/2ML
125 INJECTION, POWDER, LYOPHILIZED, FOR SOLUTION INTRAMUSCULAR; INTRAVENOUS
Status: DISCONTINUED | OUTPATIENT
Start: 2022-09-26 | End: 2022-09-27 | Stop reason: HOSPADM

## 2022-09-26 RX ORDER — CAFFEINE CITRATE 20 MG/ML
60 SOLUTION INTRAVENOUS
Status: DISCONTINUED | OUTPATIENT
Start: 2022-09-26 | End: 2022-09-27 | Stop reason: HOSPADM

## 2022-09-26 RX ORDER — ONDANSETRON 2 MG/ML
4 INJECTION INTRAMUSCULAR; INTRAVENOUS
Status: DISCONTINUED | OUTPATIENT
Start: 2022-09-26 | End: 2022-09-27 | Stop reason: HOSPADM

## 2022-09-26 RX ORDER — ACYCLOVIR 200 MG/1
0-1 CAPSULE ORAL
Status: DISCONTINUED | OUTPATIENT
Start: 2022-09-26 | End: 2022-09-27 | Stop reason: HOSPADM

## 2022-09-26 RX ORDER — ALBUTEROL SULFATE 90 UG/1
2 AEROSOL, METERED RESPIRATORY (INHALATION) EVERY 5 MIN PRN
Status: DISCONTINUED | OUTPATIENT
Start: 2022-09-26 | End: 2022-09-27 | Stop reason: HOSPADM

## 2022-09-26 RX ORDER — AMINOPHYLLINE 25 MG/ML
100 INJECTION, SOLUTION INTRAVENOUS ONCE
Status: COMPLETED | OUTPATIENT
Start: 2022-09-26 | End: 2022-09-26

## 2022-09-26 RX ADMIN — GADOBUTROL 10 ML: 604.72 INJECTION INTRAVENOUS at 09:04

## 2022-09-26 RX ADMIN — AMINOPHYLLINE 100 MG: 25 INJECTION, SOLUTION INTRAVENOUS at 08:35

## 2022-09-26 RX ADMIN — REGADENOSON 0.4 MG: 0.08 INJECTION, SOLUTION INTRAVENOUS at 08:31

## 2022-09-27 ENCOUNTER — TELEPHONE (OUTPATIENT)
Dept: TRANSPLANT | Facility: CLINIC | Age: 67
End: 2022-09-27

## 2022-09-27 LAB
ATRIAL RATE - MUSE: 98 BPM
ATRIAL RATE - MUSE: 99 BPM
DIASTOLIC BLOOD PRESSURE - MUSE: NORMAL MMHG
DIASTOLIC BLOOD PRESSURE - MUSE: NORMAL MMHG
INTERPRETATION ECG - MUSE: NORMAL
INTERPRETATION ECG - MUSE: NORMAL
P AXIS - MUSE: 20 DEGREES
P AXIS - MUSE: 35 DEGREES
PR INTERVAL - MUSE: 108 MS
PR INTERVAL - MUSE: 128 MS
QRS DURATION - MUSE: 88 MS
QRS DURATION - MUSE: 92 MS
QT - MUSE: 360 MS
QT - MUSE: 370 MS
QTC - MUSE: 462 MS
QTC - MUSE: 472 MS
R AXIS - MUSE: -15 DEGREES
R AXIS - MUSE: -22 DEGREES
SYSTOLIC BLOOD PRESSURE - MUSE: NORMAL MMHG
SYSTOLIC BLOOD PRESSURE - MUSE: NORMAL MMHG
T AXIS - MUSE: 19 DEGREES
T AXIS - MUSE: 7 DEGREES
VENTRICULAR RATE- MUSE: 98 BPM
VENTRICULAR RATE- MUSE: 99 BPM

## 2022-09-27 NOTE — TELEPHONE ENCOUNTER
Writer called pt's wife, Shivani, using .  MRI WNL- function is normal, no evidence of ischemia.  Pt has still not received Lisinopril in the mail.  Pharmacy contacted, they will mail out today.  Pt to call coordinator when they receive Rx.  Shivani states understanding instructions.

## 2022-09-30 ENCOUNTER — TELEPHONE (OUTPATIENT)
Dept: TRANSPLANT | Facility: CLINIC | Age: 67
End: 2022-09-30

## 2022-09-30 NOTE — TELEPHONE ENCOUNTER
Pt called to confirm he received his Lisinopril in the mail. Pt given following instructions:  Stop taking the Hydralazine.  Start taking Lisinopril 2 times daily.  Have your labs checked next Thursday with a Sirolimus trough (appt made at the [facility]).  Pt states understanding instructions- instructions also called to pt's daughter, Elisabeht.

## 2022-10-06 ENCOUNTER — LAB (OUTPATIENT)
Dept: LAB | Facility: CLINIC | Age: 67
End: 2022-10-06
Payer: COMMERCIAL

## 2022-10-06 DIAGNOSIS — Z94.1 HEART TRANSPLANT, ORTHOTOPIC, STATUS (H): ICD-10-CM

## 2022-10-06 LAB
ANION GAP SERPL CALCULATED.3IONS-SCNC: 9 MMOL/L (ref 7–15)
BASOPHILS # BLD AUTO: 0 10E3/UL (ref 0–0.2)
BASOPHILS NFR BLD AUTO: 0 %
BUN SERPL-MCNC: 21.8 MG/DL (ref 8–23)
CALCIUM SERPL-MCNC: 9 MG/DL (ref 8.8–10.2)
CHLORIDE SERPL-SCNC: 103 MMOL/L (ref 98–107)
CREAT SERPL-MCNC: 1.72 MG/DL (ref 0.67–1.17)
DEPRECATED HCO3 PLAS-SCNC: 25 MMOL/L (ref 22–29)
EOSINOPHIL # BLD AUTO: 0 10E3/UL (ref 0–0.7)
EOSINOPHIL NFR BLD AUTO: 1 %
ERYTHROCYTE [DISTWIDTH] IN BLOOD BY AUTOMATED COUNT: 16 % (ref 10–15)
GFR SERPL CREATININE-BSD FRML MDRD: 43 ML/MIN/1.73M2
GLUCOSE SERPL-MCNC: 202 MG/DL (ref 70–99)
HCT VFR BLD AUTO: 33.4 % (ref 40–53)
HGB BLD-MCNC: 10.9 G/DL (ref 13.3–17.7)
IMM GRANULOCYTES # BLD: 0 10E3/UL
IMM GRANULOCYTES NFR BLD: 0 %
LYMPHOCYTES # BLD AUTO: 1 10E3/UL (ref 0.8–5.3)
LYMPHOCYTES NFR BLD AUTO: 24 %
MCH RBC QN AUTO: 27.1 PG (ref 26.5–33)
MCHC RBC AUTO-ENTMCNC: 32.6 G/DL (ref 31.5–36.5)
MCV RBC AUTO: 83 FL (ref 78–100)
MONOCYTES # BLD AUTO: 0.4 10E3/UL (ref 0–1.3)
MONOCYTES NFR BLD AUTO: 9 %
NEUTROPHILS # BLD AUTO: 2.8 10E3/UL (ref 1.6–8.3)
NEUTROPHILS NFR BLD AUTO: 66 %
NRBC # BLD AUTO: 0 10E3/UL
NRBC BLD AUTO-RTO: 0 /100
PLATELET # BLD AUTO: 174 10E3/UL (ref 150–450)
POTASSIUM SERPL-SCNC: 4.9 MMOL/L (ref 3.4–5.3)
RBC # BLD AUTO: 4.02 10E6/UL (ref 4.4–5.9)
SIROLIMUS BLD-MCNC: 12.2 UG/L (ref 5–15)
SODIUM SERPL-SCNC: 137 MMOL/L (ref 136–145)
TME LAST DOSE: NORMAL H
TME LAST DOSE: NORMAL H
WBC # BLD AUTO: 4.3 10E3/UL (ref 4–11)

## 2022-10-06 PROCEDURE — 85025 COMPLETE CBC W/AUTO DIFF WBC: CPT | Performed by: PATHOLOGY

## 2022-10-06 PROCEDURE — 80195 ASSAY OF SIROLIMUS: CPT | Performed by: INTERNAL MEDICINE

## 2022-10-06 PROCEDURE — 36415 COLL VENOUS BLD VENIPUNCTURE: CPT | Performed by: PATHOLOGY

## 2022-10-06 PROCEDURE — 80048 BASIC METABOLIC PNL TOTAL CA: CPT | Performed by: PATHOLOGY

## 2022-10-06 NOTE — PROGRESS NOTES
Post Transplant Patient Social Work Assessment     Patient Name: Lucian Henderson Sr.  : 1955  Age: 64 year old  MRN: [id number]  Date of transplant: 2020    Patient known to me from follow up in the transplant program.  Admitted on 2020 for subclavian abscess. Pt went to OR  for I&D and go today for another I&D.   Seen today to update assessment.      Presenting Information   Living Situation: Pt lives with his wife, Shivani, in a home, in [place]  Functional Status: Pt is independent with ambulation. Pt requires assistance from his wife and dtr with medication management and transportation to and from app.   Cultural/Language/Spiritual Considerations: Algerian Speaking--needs      Support System  Primary Support Person WifeShivani  Other support:  4 Adult Children: Lucian Bergman (lives locally), Elisabeth ([place]), Triny ([place]) and Silvestre ([place])  Plan for support in immediate post-hospital period: Pt will return home with wife available as needed for assistance.     Health Care Directive  Decision Maker: Pt   Alternate Decision Maker: Pt's wife, is Primary, and dtr, Elisabeth, is secondary and son, Scarlett is 3rd alternate   Health Care Directive: Copy in Chart    Mental Health/Coping:   History of Mental Health: none   History of Chemical Health: none  Current status: No concerns   Coping: Pt expressed frustration with post-transplant recovery as this is now pt's second re-admission since initial discharge following heart transplant. Normalized pt's feelings and offered supportive listening.   Services Needed/Recommended: None currently.     Financial   Income: Pt and wife's Social Security   Impact of transplant on income: Minimal   Insurance and medication coverage: Yes   Financial concerns: Pt and wife report pt has had low cost co-pays for his transplant medications and they have been able to pay for them.   Resources needed: Writer provided phone number to  the Senior [facility]age Line and advised pt and wife to call agency to make sure they are on the best insurance plan for pt's situation; we are coming up on open enrollment where pt could make changes if needed.     Discharge Plan   Patient and family discharge goal: Return home.   Barriers to discharge: None identified    Education provided by SW: Social Work role inpatient setting, Medicare Open Enrollment, Senior Linkage Line     Assessment and recommendations and plan:      Pt and wife well known to writer from previous admissions. Used Uzbek phone  for visit. Pt generally doing well at home. Is frustrated with post-transplant recovery, but overall coping appropriately and has no mental health concerns. Pt has a very supportive family whom continues to provide support post-transplant. Pt has no financial concerns and actually appears to have great insurance coverage for his transplant medications. Writer did advise pt and wife to just check-in with the Senior Linkage Line to make sure they are on the best insurance plan for pt's situation.     Anticipate no discharge needs when pt is medically ready for discharge.                                    I have personally evaluated and examined the patient. The Attending was available to me as a supervising provider if needed.

## 2022-10-07 ENCOUNTER — TELEPHONE (OUTPATIENT)
Dept: TRANSPLANT | Facility: CLINIC | Age: 67
End: 2022-10-07

## 2022-10-07 DIAGNOSIS — Z94.1 HEART REPLACED BY TRANSPLANT (H): Primary | ICD-10-CM

## 2022-10-07 NOTE — TELEPHONE ENCOUNTER
"Called patient with Portuguese interpretor. Results reviewed with patient:  -Sirolimus level 12.2 (goal 3-5), confirmed ~1-hour trough. Patient takes sirolimus at 12p everyday. Explained 24-hour trough and scheduled labs for next week Friday Oct 14 at 12p. Patient verbalized understanding that he will not take sirolimus dose for that day until after lab draw.   -Cr 1.72  -Glucose 202- none fasting    BPs after starting lisinopril   -SBPs 120-130, \"did not write down\" \"dont remember bottom number\"    Advised patient to record BP readings and report back when coordinator calls to review next resulted labs.     Patient and family member verbalized understanding.   "

## 2022-10-09 ENCOUNTER — HEALTH MAINTENANCE LETTER (OUTPATIENT)
Age: 67
End: 2022-10-09

## 2022-10-14 ENCOUNTER — LAB (OUTPATIENT)
Dept: LAB | Facility: CLINIC | Age: 67
End: 2022-10-14
Payer: COMMERCIAL

## 2022-10-14 DIAGNOSIS — Z94.1 HEART REPLACED BY TRANSPLANT (H): ICD-10-CM

## 2022-10-14 DIAGNOSIS — Z94.1 HEART REPLACED BY TRANSPLANT (H): Primary | ICD-10-CM

## 2022-10-14 LAB
ANION GAP SERPL CALCULATED.3IONS-SCNC: 10 MMOL/L (ref 7–15)
BUN SERPL-MCNC: 21.4 MG/DL (ref 8–23)
CALCIUM SERPL-MCNC: 9.1 MG/DL (ref 8.8–10.2)
CHLORIDE SERPL-SCNC: 101 MMOL/L (ref 98–107)
CREAT SERPL-MCNC: 1.76 MG/DL (ref 0.67–1.17)
DEPRECATED HCO3 PLAS-SCNC: 26 MMOL/L (ref 22–29)
ERYTHROCYTE [DISTWIDTH] IN BLOOD BY AUTOMATED COUNT: 15.9 % (ref 10–15)
GFR SERPL CREATININE-BSD FRML MDRD: 42 ML/MIN/1.73M2
GLUCOSE SERPL-MCNC: 163 MG/DL (ref 70–99)
HCT VFR BLD AUTO: 35.5 % (ref 40–53)
HGB BLD-MCNC: 11.6 G/DL (ref 13.3–17.7)
MCH RBC QN AUTO: 27.4 PG (ref 26.5–33)
MCHC RBC AUTO-ENTMCNC: 32.7 G/DL (ref 31.5–36.5)
MCV RBC AUTO: 84 FL (ref 78–100)
PLATELET # BLD AUTO: 185 10E3/UL (ref 150–450)
POTASSIUM SERPL-SCNC: 4.6 MMOL/L (ref 3.4–5.3)
RBC # BLD AUTO: 4.24 10E6/UL (ref 4.4–5.9)
SODIUM SERPL-SCNC: 137 MMOL/L (ref 136–145)
WBC # BLD AUTO: 4.9 10E3/UL (ref 4–11)

## 2022-10-14 PROCEDURE — 80048 BASIC METABOLIC PNL TOTAL CA: CPT | Performed by: PATHOLOGY

## 2022-10-14 PROCEDURE — 36415 COLL VENOUS BLD VENIPUNCTURE: CPT | Performed by: PATHOLOGY

## 2022-10-14 PROCEDURE — 85027 COMPLETE CBC AUTOMATED: CPT | Performed by: PATHOLOGY

## 2022-10-17 LAB
SIROLIMUS BLD-MCNC: 5.1 UG/L (ref 5–15)
TME LAST DOSE: NORMAL H
TME LAST DOSE: NORMAL H

## 2022-10-18 ENCOUNTER — TELEPHONE (OUTPATIENT)
Dept: TRANSPLANT | Facility: CLINIC | Age: 67
End: 2022-10-18

## 2022-10-18 ASSESSMENT — ENCOUNTER SYMPTOMS: NEW SYMPTOMS OF CORONARY ARTERY DISEASE: 0

## 2022-10-18 NOTE — TELEPHONE ENCOUNTER
Pt called with lab results using .  CBC and BMP as expected.  Sirolimus level at goal.  No dose changes needed at this time.  Pt states understanding instructions.

## 2022-11-03 DIAGNOSIS — Z94.1 HEART REPLACED BY TRANSPLANT (H): ICD-10-CM

## 2022-11-04 RX ORDER — LANOLIN ALCOHOL/MO/W.PET/CERES
CREAM (GRAM) TOPICAL
Qty: 180 TABLET | Refills: 3 | Status: SHIPPED | OUTPATIENT
Start: 2022-11-04 | End: 2023-06-19

## 2022-11-04 RX ORDER — SIROLIMUS 0.5 MG/1
TABLET, FILM COATED ORAL
Qty: 120 TABLET | Refills: 11 | Status: SHIPPED | OUTPATIENT
Start: 2022-11-04 | End: 2023-07-17

## 2022-11-25 DIAGNOSIS — Z94.1 HEART TRANSPLANT, ORTHOTOPIC, STATUS (H): ICD-10-CM

## 2022-11-28 RX ORDER — TACROLIMUS 1 MG/1
3 CAPSULE ORAL 2 TIMES DAILY
Qty: 180 CAPSULE | Refills: 11 | Status: SHIPPED | OUTPATIENT
Start: 2022-11-28 | End: 2023-06-15 | Stop reason: ALTCHOICE

## 2022-11-29 DIAGNOSIS — Z79.4 TYPE 2 DIABETES MELLITUS WITH HYPERGLYCEMIA, WITH LONG-TERM CURRENT USE OF INSULIN (H): ICD-10-CM

## 2022-11-29 DIAGNOSIS — E11.65 TYPE 2 DIABETES MELLITUS WITH HYPERGLYCEMIA, WITH LONG-TERM CURRENT USE OF INSULIN (H): ICD-10-CM

## 2022-12-05 RX ORDER — DULAGLUTIDE 3 MG/.5ML
3 INJECTION, SOLUTION SUBCUTANEOUS
Qty: 6 ML | Refills: 0 | Status: SHIPPED | OUTPATIENT
Start: 2022-12-05 | End: 2023-01-17 | Stop reason: DRUGHIGH

## 2022-12-05 NOTE — TELEPHONE ENCOUNTER
TRULICITY 3MG/0.5ML SDP 0.5ML      Last Written Prescription Date:  5/5/22  Last Fill Quantity: 7ml,   # refills: 3  Last Office Visit : 4/12/22  Future Office visit:  1/17/23    Routing refill request to provider for review/approval because:  Abnormal creatinine  Creatinine   Date Value Ref Range Status   10/14/2022 1.76 (H) 0.67 - 1.17 mg/dL Final   05/11/2021 1.84 (H) 0.66 - 1.25 mg/dL Final

## 2022-12-19 DIAGNOSIS — Z94.1 HEART TRANSPLANT, ORTHOTOPIC, STATUS (H): Primary | ICD-10-CM

## 2023-01-10 NOTE — PROGRESS NOTES
Please MAIL S    Diabetes Virtual Consult Note  Marisabel Prieto PA-C  January 17, 2023        Lucian Dalearza Sr. is a 67 year old male with a past medical history including  has a past medical history of CAD (coronary artery disease), CHF (congestive heart failure) (H), CKD (chronic kidney disease), stage III (H), Cortical cataract of both eyes, Diabetes (H), Hyperlipidemia, Hypertension, Infection due to Streptococcus mitis group (09/23/2020), Ischemic cardiomyopathy, Obesity, CHANTEL (obstructive sleep apnea), CHANTEL (obstructive sleep apnea)- severe (AHI 30), and Osteoarthritis.    He has no past medical history of Chronic infection, COPD (chronic obstructive pulmonary disease) (H), Heart murmur, Irregular heart beat, Liver disease, Other chronic pain, or Uncomplicated asthma.  He is  s/p heart transplant on 7/19/2020 and left eye retinectomy on 12/21/20.    His diabetes is also complicated by proliferative diabetic retinopathy.  He had vitreous hemorrhage in his right eye in April 2021, resolved with last atorvastatin injection 8/4/2022.  Also has undergone PRP in his right eye on 8/24 2022.  His left eye also has proliferative diabetic retinopathy and he had his last Avastin injection in June 2022 was advised to come back in October of 2022 follow-up.    Recent diabetes clinic notes:    Feb 2021:  At that time BG was reasonably controlled early in the morning but with BG later in the day above goal.  I suspect some of this is due to varying NPH dose in the morning.      Apr 2022:  Advised: Continue  NPH to 40 qam, 12 qpm, Novolog 10 to 14 units with breakfast and lunch when blood sugar is higher.  We will try increasing Trulicity to 3 mg weekly and counseled both her heels and Shivani and using lower doses of NovoLog until we see what his after meal blood sugars look like with this.      Interim:  He has not been hospitalized.  He had his eye care updated in August 2022 he was to follow-up in  October.      Today:     Recent A1c is 8.4 in September.  Hemoglobin   Date Value Ref Range Status   10/14/2022 11.6 (L) 13.3 - 17.7 g/dL Final   05/11/2021 13.4 13.3 - 17.7 g/dL Final   ]    I met with Lucian and his wife Shivani via visit video visit and they report they are well.  They defer professional  .    We visited in [place] .  They enjoyed holiday from his friends and blood sugars were a little bit higher with that but not too bad.  Continue to note that his BG higher when eats more, but now he is doing a bit  Better.  It was 103 this morning.  Eats at 2pm again - check BG 2 hours after ~4 pm and then again at 8 pm.  Eats his last meal at 7:30 pm.  He sleeps around 9pm.  They sometimes walks outdoors but not if it is icy.  If not he does use his treadmill inside the home every day after lunch.  He is tolerating exercise well not short of breath no chest pain or lightheadedness.  They report that his weight is stable to improved.  He feels his appetite is better on Trulicity which she continues to tolerate well.  Denies abdominal pain reflux nausea or vomiting.  He feels that he has good control over his appetite making mostly healthy choices.  He is open to further increasing dose.    Current DM Regimen:    Gives NPH 40 ac qam.  Also if high gives Novolog.    Gives NPH 12 - 16 qhs.    Novolog gives with breakfast 10 - 12 units if BG >120.  And again around 4 pm 12 - 16 units depending on the blood sugar.    They do not give Novolog in evening for fear of low overnight.  Last low overnight was prior to last visit in early October.  They DO NOT give any Novolog with dinner.      Trulicity weekly       We reviewed glucometer, pump and CGMS data together.  It revealed:  1/3 123 am 236 afternoon 112 pm  1/4 am 166 noon 144 pm 153  1/5 am 118 noon 212 pm 178  1/6 am 150 noon 130 pm 176  1/7 am 108 noon 213 pm 112  1/8 am 169 noon 203 pm 110  1/9 am 130 noon 253 pm 180  1/10 am 137 noon 209 pm 186  1/11  am 144 noon 263 pm 191  1/12 am 128 noon 335 pm 209  1/13 am 181    Fasting, 2h after lunch (give Novolog if >200), and 30 minutes after dinner.    (So his BG is coming down a good deal with post lunch injection, presumably well under goal a dinner.)        History of Diabetes monitoring and complications/ prevention:  CAD: Yes  Last eye exam results: 7/21/2021.  Did PRP an Avastatin and discussed surgical options for right eye which has hx vitreous hemorrhage.    Last dental exam: Reports UTD  Microalbuminuria: 6 October 2018  HTN: Yes  On Statin: Yes  On Aspirin: Yes  Depression: No - worried, but happy. Hopeful.    ED: yes, limited concerned at this time     Janice  has a past medical history of CAD (coronary artery disease), CHF (congestive heart failure) (H), CKD (chronic kidney disease), stage III (H), Cortical cataract of both eyes, Diabetes (H), Hyperlipidemia, Hypertension, Infection due to Streptococcus mitis group (09/23/2020), Ischemic cardiomyopathy, Obesity, CHANTEL (obstructive sleep apnea), CHANTEL (obstructive sleep apnea)- severe (AHI 30), and Osteoarthritis.    He has no past medical history of Chronic infection, COPD (chronic obstructive pulmonary disease) (H), Heart murmur, Irregular heart beat, Liver disease, Other chronic pain, or Uncomplicated asthma.  Current Outpatient Medications   Medication     acetaminophen (TYLENOL) 325 MG tablet     Alcohol Swabs PADS     aspirin (ASA) 81 MG chewable tablet     blood glucose (NO BRAND SPECIFIED) test strip     blood glucose monitoring (ACCU-CHEK FELIPE SMARTVIEW) meter device kit     blood glucose monitoring (SOFTCLIX) lancets     Calcium Carb-Cholecalciferol (CALCIUM CARBONATE-VITAMIN D3) 600-400 MG-UNIT TABS     Continuous Blood Gluc Sensor (FREESTYLE JEREMY 14 DAY SENSOR) MISC     Continuous Blood Gluc Sensor (FREESTYLE JEREMY 14 DAY SENSOR) MISC     Dulaglutide (TRULICITY) 3 MG/0.5ML SOPN     furosemide (LASIX) 20 MG tablet     HUMALOG KWIKPEN 100 UNIT/ML  soln     insulin aspart (NOVOLOG PEN) 100 UNIT/ML pen     insulin  UNIT/ML injection     insulin  UNIT/ML injection     insulin pen needle (32G X 4 MM) 32G X 4 MM miscellaneous     latanoprost (XALATAN) 0.005 % ophthalmic solution     lisinopril (ZESTRIL) 10 MG tablet     magnesium oxide (MAG-OX) 400 (241.3 Mg) MG tablet     MAGNESIUM OXIDE 400 (240 Mg) MG tablet     neomycin-polymixin-dexamethasone (MAXITROL) 0.1 % ophthalmic suspension     neomycin-polymixin-dexamethasone (MAXITROL) ophthalmic ointment     order for DME     pantoprazole (PROTONIX) 40 MG EC tablet     prednisoLONE acetate (PRED FORTE) 1 % ophthalmic suspension     prednisoLONE acetate (PRED FORTE) 1 % ophthalmic suspension     rosuvastatin (CRESTOR) 10 MG tablet     senna-docusate (SENOKOT-S/PERICOLACE) 8.6-50 MG tablet     sirolimus (GENERIC EQUIVALENT) 0.5 MG tablet     sulfamethoxazole-trimethoprim (BACTRIM) 400-80 MG tablet     tacrolimus (GENERIC EQUIVALENT) 1 MG capsule     tamsulosin (FLOMAX) 0.4 MG capsule     Current Facility-Administered Medications   Medication     bevacizumab (AVASTIN) intravitreal inj 1.25 mg     bevacizumab (AVASTIN) intravitreal inj 1.25 mg         Lucian's family history includes Diabetes in his brother, sister, and sister.    ROS:   Patient denies any fevers, chills or sweats as well as any changes in or problems with vision, pain or problems with dentition, new or different headaches.  Patient denies symptoms of hypo and hyperglycemia except as above.   Patients denies marked fatigue, cough, shortness of breath, chest pain or pressure.  There has been no pain with or other changes in urination or  itching or pain in genital areas.  Patient denies any noted swelling in feet, ankles or otherwise, loss of sensation or pain  in feet or other areas.    Patient also denies current difficulties with depressed mood, anhedonia or worrying too much.  He is tired of COVID precautions, but feels god and  "optimistic.        Exam:      Wt Readings from Last 10 Encounters:   09/12/22 83.7 kg (184 lb 9.6 oz)   09/12/22 84.6 kg (186 lb 8.2 oz)   03/14/22 85.7 kg (189 lb)   03/02/22 86.7 kg (191 lb 3.2 oz)   11/17/21 86.2 kg (190 lb)   10/19/21 90.4 kg (199 lb 4.8 oz)   09/28/21 87.5 kg (193 lb)   07/20/21 84.8 kg (187 lb)   07/19/21 86.2 kg (190 lb)   05/11/21 82.5 kg (181 lb 14.4 oz)   Estimated body mass index is 29.98 kg/m  as calculated from the following:    Height as of 9/12/22: 1.671 m (5' 5.8\").    Weight as of 9/12/22: 83.7 kg (184 lb 9.6 oz).      Video visit due to COVID precautions.      This is a warm and pleasant obese individual in no acute distress - with spouse in home.  Mood is \"good,\"  affect is appropriate,    Her eyes are clear without proptosis, with healthy appearing lens, conjunctiva and sclera.  EOMs intact.  Nares are patent.  Neck is supple without mass or JVD noted.    Respirations are easy and unlabored.  Skin is warm moist and elastic without lesions noted.         Data:      Recent Labs   Lab Test 10/14/22  1220 10/06/22  1320 09/12/22  0855 09/12/22  0854 03/14/22  1014 11/30/21  0859 11/17/21  1126 11/17/21  0848 08/10/20  0845 08/05/20  0843 07/07/20  1732 07/07/20  0720 10/16/18  0904 10/03/18  0725 08/16/18  0834 08/06/18  0000 04/30/18  1148 04/30/18  0000   A1C  --   --  8.4*  --  7.4*  --   --  7.3*   < >  --   --   --    < >  --   --   --   --   --    HEMOGLOBINA1  --   --   --   --   --   --   --   --   --   --   --   --   --   --   --  8.3*  --  10.6*   TSH  --   --   --   --   --   --   --   --   --  0.80  --  1.30   < >  --   --   --   --   --    LDL  --   --   --  38  --   --   --  28   < >  --   --   --    < >  --    < >  --   --   --    HDL  --   --   --  32*  --   --   --  35*   < >  --   --   --    < >  --    < >  --   --   --    TRIG  --   --   --  223*  --   --   --  322*   < >  --   --   --    < >  --    < >  --   --   --    CR 1.76* 1.72*  --  1.75* 1.94*   < >  --  " 2.19*   < > 1.70*   < >  --    < >  --    < >  --    < >  --    MICROL  --   --   --   --   --   --  146  --   --   --   --   --   --  6  --   --   --   --     < > = values in this interval not displayed.     Hemoglobin   Date Value Ref Range Status   10/14/2022 11.6 (L) 13.3 - 17.7 g/dL Final   05/11/2021 13.4 13.3 - 17.7 g/dL Final   ]    Most recent GFR 42 in October 2022.    Assessment/Plan:    Lucian is a 65 year old male with  has a past medical history of CAD (coronary artery disease), CHF (congestive heart failure) (H), CKD (chronic kidney disease), stage III (H), Cortical cataract of both eyes, Diabetes (H), Hyperlipidemia, Hypertension, Infection due to Streptococcus mitis group (09/23/2020), Ischemic cardiomyopathy, Obesity, CHANTEL (obstructive sleep apnea), CHANTEL (obstructive sleep apnea)- severe (AHI 30), and Osteoarthritis.    He has no past medical history of Chronic infection, COPD (chronic obstructive pulmonary disease) (H), Heart murmur, Irregular heart beat, Liver disease, Other chronic pain, or Uncomplicated asthma.  He is  s/p heart transplant on 7/19/2020 and left eye retinectomy on 12/21/20.    Lucian has type 2 diabetes complicated by ICM and heart transplant, CKD ( - GFR 28- 34 in last 6 m) , obesity, retinopathy, and microalbuminuria that is reasonably controlled with A1C 7.4% last month though fictitiously depressed likely with lower Hgb.      There is a paucity of data but glycemic control appears fair.  They deny any difficulties with hypoglycemia.    Plan:    Continue NPH 40 units, try to give 1/2-hour before breakfast.  Move second dose of NPH to prior to dinner continue to give 12 to 16 units.  I am asking them to check the blood sugar only before lunch and dinner as they do with breakfast, and to give NovoLog with those meals only when blood sugar is greater than 170 before the meal using the scales they have.      Increase Trulicity to 4.5 mg.  Hold Novolog if numbers good - more gentle  scale if needed even with Trulicity 4.5 mg on board.  For now we will treat only 3 prandial numbers greater than 200.    His GFR remains above 30 and I would like to consider adding in some empagliflozin at [facility].    encourage him to continue his walking.    They are aware that he has eye clinic follow-up next week.    Return to clinic in 12 weeks.    It is my privilege to be involved in the care of the above patient.     Marisabel Prieto PA-C, MPAS  [facility]  Diabetes, Endocrinology, and Metabolism  [phone] Appointments/Nurse  [phone] pager  [phone] nurse line    This note was completed in part using Dragon voice recognition, and may contain word and grammatical errors.    Video: 11:25 - 11:49  40 minutes spent on the date of the encounter doing chart review, history and exam, documentation, education and counseling, as well as communication and coordination of care, and further activities as noted above.  This time excludes time spent reviewing CGM.    Addendum:  Novolog not covered  - Change to Humalog and requesting MAIL updated sliding scale which Shivani is expecting.

## 2023-01-13 ENCOUNTER — TELEPHONE (OUTPATIENT)
Dept: ENDOCRINOLOGY | Facility: CLINIC | Age: 68
End: 2023-01-13

## 2023-01-17 ENCOUNTER — TELEPHONE (OUTPATIENT)
Dept: ENDOCRINOLOGY | Facility: CLINIC | Age: 68
End: 2023-01-17

## 2023-01-17 ENCOUNTER — VIRTUAL VISIT (OUTPATIENT)
Dept: ENDOCRINOLOGY | Facility: CLINIC | Age: 68
End: 2023-01-17
Payer: COMMERCIAL

## 2023-01-17 DIAGNOSIS — D84.81 IMMUNODEFICIENCY DUE TO CONDITIONS CLASSIFIED ELSEWHERE (H): ICD-10-CM

## 2023-01-17 DIAGNOSIS — E11.3513 TYPE 2 DIABETES MELLITUS WITH PROLIFERATIVE RETINOPATHY OF BOTH EYES AND MACULAR EDEMA, UNSPECIFIED WHETHER LONG TERM INSULIN USE (H): ICD-10-CM

## 2023-01-17 DIAGNOSIS — N18.32 STAGE 3B CHRONIC KIDNEY DISEASE (H): ICD-10-CM

## 2023-01-17 DIAGNOSIS — I50.22 CHRONIC SYSTOLIC HEART FAILURE (H): ICD-10-CM

## 2023-01-17 DIAGNOSIS — Z94.1 HEART TRANSPLANT, ORTHOTOPIC, STATUS (H): ICD-10-CM

## 2023-01-17 DIAGNOSIS — I25.739 CORONARY ARTERY DISEASE INVOLVING NONAUTOLOGOUS BIOLOGICAL CORONARY BYPASS GRAFT WITH ANGINA PECTORIS (H): ICD-10-CM

## 2023-01-17 DIAGNOSIS — Z79.4 TYPE 2 DIABETES MELLITUS WITH HYPERGLYCEMIA, WITH LONG-TERM CURRENT USE OF INSULIN (H): Primary | ICD-10-CM

## 2023-01-17 DIAGNOSIS — E11.65 TYPE 2 DIABETES MELLITUS WITH HYPERGLYCEMIA, WITH LONG-TERM CURRENT USE OF INSULIN (H): Primary | ICD-10-CM

## 2023-01-17 DIAGNOSIS — I48.92 ATRIAL FLUTTER, UNSPECIFIED TYPE (H): ICD-10-CM

## 2023-01-17 PROCEDURE — 99215 OFFICE O/P EST HI 40 MIN: CPT | Mod: GT | Performed by: PHYSICIAN ASSISTANT

## 2023-01-17 PROCEDURE — 95251 CONT GLUC MNTR ANALYSIS I&R: CPT | Performed by: PHYSICIAN ASSISTANT

## 2023-01-17 RX ORDER — DULAGLUTIDE 4.5 MG/.5ML
4.5 INJECTION, SOLUTION SUBCUTANEOUS WEEKLY
Qty: 6 ML | Refills: 1 | Status: SHIPPED | OUTPATIENT
Start: 2023-01-17 | End: 2023-05-31

## 2023-01-17 NOTE — TELEPHONE ENCOUNTER
Novolog is not covered under insurance, Huamlog is covered. Please change and send in new order, or advise on prior auth and appeal.    Thanks!  [facility]   Specialty/Endo Pharmacy Liaison Float  P [phone]  F [phone]

## 2023-01-17 NOTE — LETTER
1/17/2023       RE: Lucian Oliveira  [address] [place] [address]     Dear Colleague,    Thank you for referring your patient, Lucian Oliveira, to the [facility] [place] at [facility]. Please see a copy of my visit note below.    Please MAIL AVS    Diabetes Virtual Consult Note  Marisabel Prieto PA-C  January 17, 2023        Lucian Dalearza Sr. is a 67 year old male with a past medical history including  has a past medical history of CAD (coronary artery disease), CHF (congestive heart failure) (H), CKD (chronic kidney disease), stage III (H), Cortical cataract of both eyes, Diabetes (H), Hyperlipidemia, Hypertension, Infection due to Streptococcus mitis group (09/23/2020), Ischemic cardiomyopathy, Obesity, CHANTEL (obstructive sleep apnea), CHANTEL (obstructive sleep apnea)- severe (AHI 30), and Osteoarthritis.    He has no past medical history of Chronic infection, COPD (chronic obstructive pulmonary disease) (H), Heart murmur, Irregular heart beat, Liver disease, Other chronic pain, or Uncomplicated asthma.  He is  s/p heart transplant on 7/19/2020 and left eye retinectomy on 12/21/20.    His diabetes is also complicated by proliferative diabetic retinopathy.  He had vitreous hemorrhage in his right eye in April 2021, resolved with last atorvastatin injection 8/4/2022.  Also has undergone PRP in his right eye on 8/24 2022.  His left eye also has proliferative diabetic retinopathy and he had his last Avastin injection in June 2022 was advised to come back in October of 2022 follow-up.    Recent diabetes clinic notes:    Feb 2021:  At that time BG was reasonably controlled early in the morning but with BG later in the day above goal.  I suspect some of this is due to varying NPH dose in the morning.      Apr 2022:  Advised: Continue  NPH to 40 qam, 12 qpm, Novolog 10 to 14 units with breakfast and lunch when blood  sugar is higher.  We will try increasing Trulicity to 3 mg weekly and counseled both her heels and Shivani and using lower doses of NovoLog until we see what his after meal blood sugars look like with this.      Interim:  He has not been hospitalized.  He had his eye care updated in August 2022 he was to follow-up in October.      Today:     Recent A1c is 8.4 in September.  Hemoglobin   Date Value Ref Range Status   10/14/2022 11.6 (L) 13.3 - 17.7 g/dL Final   05/11/2021 13.4 13.3 - 17.7 g/dL Final   ]    I met with Lucian and his wife Shivani via visit video visit and they report they are well.  They defer professional  .    We visited in [place] .  They enjoyed holiday from his friends and blood sugars were a little bit higher with that but not too bad.  Continue to note that his BG higher when eats more, but now he is doing a bit  Better.  It was 103 this morning.  Eats at 2pm again - check BG 2 hours after ~4 pm and then again at 8 pm.  Eats his last meal at 7:30 pm.  He sleeps around 9pm.  They sometimes walks outdoors but not if it is icy.  If not he does use his treadmill inside the home every day after lunch.  He is tolerating exercise well not short of breath no chest pain or lightheadedness.  They report that his weight is stable to improved.  He feels his appetite is better on Trulicity which she continues to tolerate well.  Denies abdominal pain reflux nausea or vomiting.  He feels that he has good control over his appetite making mostly healthy choices.  He is open to further increasing dose.    Current DM Regimen:    Gives NPH 40 ac qam.  Also if high gives Novolog.    Gives NPH 12 - 16 qhs.    Novolog gives with breakfast 10 - 12 units if BG >120.  And again around 4 pm 12 - 16 units depending on the blood sugar.    They do not give Novolog in evening for fear of low overnight.  Last low overnight was prior to last visit in early October.  They DO NOT give any Novolog with dinner.       Trulicity weekly       We reviewed glucometer, pump and CGMS data together.  It revealed:  1/3 123 am 236 afternoon 112 pm  1/4 am 166 noon 144 pm 153  1/5 am 118 noon 212 pm 178  1/6 am 150 noon 130 pm 176  1/7 am 108 noon 213 pm 112  1/8 am 169 noon 203 pm 110  1/9 am 130 noon 253 pm 180  1/10 am 137 noon 209 pm 186  1/11 am 144 noon 263 pm 191  1/12 am 128 noon 335 pm 209  1/13 am 181    Fasting, 2h after lunch (give Novolog if >200), and 30 minutes after dinner.    (So his BG is coming down a good deal with post lunch injection, presumably well under goal a dinner.)        History of Diabetes monitoring and complications/ prevention:  CAD: Yes  Last eye exam results: 7/21/2021.  Did PRP an Avastatin and discussed surgical options for right eye which has hx vitreous hemorrhage.    Last dental exam: Reports UTD  Microalbuminuria: 6 October 2018  HTN: Yes  On Statin: Yes  On Aspirin: Yes  Depression: No - worried, but happy. Hopeful.    ED: yes, limited concerned at this time     Janice  has a past medical history of CAD (coronary artery disease), CHF (congestive heart failure) (H), CKD (chronic kidney disease), stage III (H), Cortical cataract of both eyes, Diabetes (H), Hyperlipidemia, Hypertension, Infection due to Streptococcus mitis group (09/23/2020), Ischemic cardiomyopathy, Obesity, CHANTEL (obstructive sleep apnea), CHANTEL (obstructive sleep apnea)- severe (AHI 30), and Osteoarthritis.    He has no past medical history of Chronic infection, COPD (chronic obstructive pulmonary disease) (H), Heart murmur, Irregular heart beat, Liver disease, Other chronic pain, or Uncomplicated asthma.  Current Outpatient Medications   Medication     acetaminophen (TYLENOL) 325 MG tablet     Alcohol Swabs PADS     aspirin (ASA) 81 MG chewable tablet     blood glucose (NO BRAND SPECIFIED) test strip     blood glucose monitoring (ACCU-CHEK FELIPE SMARTVIEW) meter device kit     blood glucose monitoring (SOFTCLIX) lancets      Calcium Carb-Cholecalciferol (CALCIUM CARBONATE-VITAMIN D3) 600-400 MG-UNIT TABS     Continuous Blood Gluc Sensor (FREESTYLE JEREMY 14 DAY SENSOR) MISC     Continuous Blood Gluc Sensor (FREESTYLE JEREMY 14 DAY SENSOR) MISC     Dulaglutide (TRULICITY) 3 MG/0.5ML SOPN     furosemide (LASIX) 20 MG tablet     HUMALOG KWIKPEN 100 UNIT/ML soln     insulin aspart (NOVOLOG PEN) 100 UNIT/ML pen     insulin  UNIT/ML injection     insulin  UNIT/ML injection     insulin pen needle (32G X 4 MM) 32G X 4 MM miscellaneous     latanoprost (XALATAN) 0.005 % ophthalmic solution     lisinopril (ZESTRIL) 10 MG tablet     magnesium oxide (MAG-OX) 400 (241.3 Mg) MG tablet     MAGNESIUM OXIDE 400 (240 Mg) MG tablet     neomycin-polymixin-dexamethasone (MAXITROL) 0.1 % ophthalmic suspension     neomycin-polymixin-dexamethasone (MAXITROL) ophthalmic ointment     order for DME     pantoprazole (PROTONIX) 40 MG EC tablet     prednisoLONE acetate (PRED FORTE) 1 % ophthalmic suspension     prednisoLONE acetate (PRED FORTE) 1 % ophthalmic suspension     rosuvastatin (CRESTOR) 10 MG tablet     senna-docusate (SENOKOT-S/PERICOLACE) 8.6-50 MG tablet     sirolimus (GENERIC EQUIVALENT) 0.5 MG tablet     sulfamethoxazole-trimethoprim (BACTRIM) 400-80 MG tablet     tacrolimus (GENERIC EQUIVALENT) 1 MG capsule     tamsulosin (FLOMAX) 0.4 MG capsule     Current Facility-Administered Medications   Medication     bevacizumab (AVASTIN) intravitreal inj 1.25 mg     bevacizumab (AVASTIN) intravitreal inj 1.25 mg         Lucian's family history includes Diabetes in his brother, sister, and sister.    ROS:   Patient denies any fevers, chills or sweats as well as any changes in or problems with vision, pain or problems with dentition, new or different headaches.  Patient denies symptoms of hypo and hyperglycemia except as above.   Patients denies marked fatigue, cough, shortness of breath, chest pain or pressure.  There has been no pain with or other  "changes in urination or  itching or pain in genital areas.  Patient denies any noted swelling in feet, ankles or otherwise, loss of sensation or pain  in feet or other areas.    Patient also denies current difficulties with depressed mood, anhedonia or worrying too much.  He is tired of COVID precautions, but feels god and optimistic.        Exam:      Wt Readings from Last 10 Encounters:   09/12/22 83.7 kg (184 lb 9.6 oz)   09/12/22 84.6 kg (186 lb 8.2 oz)   03/14/22 85.7 kg (189 lb)   03/02/22 86.7 kg (191 lb 3.2 oz)   11/17/21 86.2 kg (190 lb)   10/19/21 90.4 kg (199 lb 4.8 oz)   09/28/21 87.5 kg (193 lb)   07/20/21 84.8 kg (187 lb)   07/19/21 86.2 kg (190 lb)   05/11/21 82.5 kg (181 lb 14.4 oz)   Estimated body mass index is 29.98 kg/m  as calculated from the following:    Height as of 9/12/22: 1.671 m (5' 5.8\").    Weight as of 9/12/22: 83.7 kg (184 lb 9.6 oz).      Video visit due to COVID precautions.      This is a warm and pleasant obese individual in no acute distress - with spouse in home.  Mood is \"good,\"  affect is appropriate,    Her eyes are clear without proptosis, with healthy appearing lens, conjunctiva and sclera.  EOMs intact.  Nares are patent.  Neck is supple without mass or JVD noted.    Respirations are easy and unlabored.  Skin is warm moist and elastic without lesions noted.         Data:      Recent Labs   Lab Test 10/14/22  1220 10/06/22  1320 09/12/22  0855 09/12/22  0854 03/14/22  1014 11/30/21  0859 11/17/21  1126 11/17/21  0848 08/10/20  0845 08/05/20  0843 07/07/20  1732 07/07/20  0720 10/16/18  0904 10/03/18  0725 08/16/18  0834 08/06/18  0000 04/30/18  1148 04/30/18  0000   A1C  --   --  8.4*  --  7.4*  --   --  7.3*   < >  --   --   --    < >  --   --   --   --   --    HEMOGLOBINA1  --   --   --   --   --   --   --   --   --   --   --   --   --   --   --  8.3*  --  10.6*   TSH  --   --   --   --   --   --   --   --   --  0.80  --  1.30   < >  --   --   --   --   --    LDL  --   -- "   --  38  --   --   --  28   < >  --   --   --    < >  --    < >  --   --   --    HDL  --   --   --  32*  --   --   --  35*   < >  --   --   --    < >  --    < >  --   --   --    TRIG  --   --   --  223*  --   --   --  322*   < >  --   --   --    < >  --    < >  --   --   --    CR 1.76* 1.72*  --  1.75* 1.94*   < >  --  2.19*   < > 1.70*   < >  --    < >  --    < >  --    < >  --    MICROL  --   --   --   --   --   --  146  --   --   --   --   --   --  6  --   --   --   --     < > = values in this interval not displayed.     Hemoglobin   Date Value Ref Range Status   10/14/2022 11.6 (L) 13.3 - 17.7 g/dL Final   05/11/2021 13.4 13.3 - 17.7 g/dL Final   ]    Most recent GFR 42 in October 2022.    Assessment/Plan:    Lucian is a 65 year old male with  has a past medical history of CAD (coronary artery disease), CHF (congestive heart failure) (H), CKD (chronic kidney disease), stage III (H), Cortical cataract of both eyes, Diabetes (H), Hyperlipidemia, Hypertension, Infection due to Streptococcus mitis group (09/23/2020), Ischemic cardiomyopathy, Obesity, CHANTEL (obstructive sleep apnea), CHANTEL (obstructive sleep apnea)- severe (AHI 30), and Osteoarthritis.    He has no past medical history of Chronic infection, COPD (chronic obstructive pulmonary disease) (H), Heart murmur, Irregular heart beat, Liver disease, Other chronic pain, or Uncomplicated asthma.  He is  s/p heart transplant on 7/19/2020 and left eye retinectomy on 12/21/20.    Lucian has type 2 diabetes complicated by ICM and heart transplant, CKD ( - GFR 28- 34 in last 6 m) , obesity, retinopathy, and microalbuminuria that is reasonably controlled with A1C 7.4% last month though fictitiously depressed likely with lower Hgb.      There is a paucity of data but glycemic control appears fair.  They deny any difficulties with hypoglycemia.    Plan:    Continue NPH 40 units, try to give 1/2-hour before breakfast.  Move second dose of NPH to prior to dinner continue to  give 12 to 16 units.  I am asking them to check the blood sugar only before lunch and dinner as they do with breakfast, and to give NovoLog with those meals only when blood sugar is greater than 170 before the meal using the scales they have.      Increase Trulicity to 4.5 mg.  Hold Novolog if numbers good - more gentle scale if needed even with Trulicity 4.5 mg on board.  For now we will treat only 3 prandial numbers greater than 200.    His GFR remains above 30 and I would like to consider adding in some empagliflozin at [facility].    encourage him to continue his walking.    They are aware that he has eye clinic follow-up next week.    Return to clinic in 12 weeks.    It is my privilege to be involved in the care of the above patient.     Marisabel Prieto PA-C, [facility]  [facility]  Diabetes, Endocrinology, and Metabolism  [phone] Appointments/Nurse  [phone] pager  [phone] nurse line    This note was completed in part using Dragon voice recognition, and may contain word and grammatical errors.    Video: 11:25 - 11:49  40 minutes spent on the date of the encounter doing chart review, history and exam, documentation, education and counseling, as well as communication and coordination of care, and further activities as noted above.  This time excludes time spent reviewing CGM.    Addendum:  Novolog not covered  - Change to Humalog and requesting MAIL updated sliding scale which Shivani is expecting.      Again, thank you for allowing me to participate in the care of your patient.      Sincerely,    Marisabel Prieto PA-C

## 2023-01-17 NOTE — PATIENT INSTRUCTIONS
Estimado Lucian y Shivani,    Sigue insulin NPH 40 ANTES del desayuno y 12 - 16 unidades antes del la jesusita.     Tambien da Novolog si el azucar es >170 antes de comer, kiley no hay necisidad de chequer despues de comer.      Por favor chequea el azucar antes de almorzar y cenar y pone - [place] a HUMALOG) con la comida si el azucar es >170.     Williams kely riniones estan savannah con las proximos laboratorios,  hablaremos de Empagliflozin en la proxima lizandro.     Llame con cualqiuer pregunta o preocupacion.    Mis mejores deseldonos,      Marisabel Prieto PA-C, [facility]  [facility]  Diabetes, Endocrinology, and Metabolism  [phone] telefono  [phone] Fax  [phone] URGENTafter hours/weekend Endocrinologist on call    Insulina Humalog ANTES DE cada comida:            Si Azucar Antes de comer es ____, da ____ unidades:    Azucar 170-199 pone 8 units.   Azucar 200-229 pone  9 units.   Azucar  230-259 pone  10 units.   Azucar  260-289 pone  11 units,   Azucar 290 - 319 pone  12 units,   Azucar 320 - 349 pone 13 units,   Azucar  >350 pone  14 units

## 2023-01-20 RX ORDER — INSULIN LISPRO 100 [IU]/ML
8-14 INJECTION, SOLUTION INTRAVENOUS; SUBCUTANEOUS
Qty: 15 ML | Refills: 1 | Status: SHIPPED | OUTPATIENT
Start: 2023-01-20 | End: 2023-03-27

## 2023-01-25 ENCOUNTER — OFFICE VISIT (OUTPATIENT)
Dept: OPHTHALMOLOGY | Facility: CLINIC | Age: 68
End: 2023-01-25
Attending: OPHTHALMOLOGY
Payer: COMMERCIAL

## 2023-01-25 ENCOUNTER — TELEPHONE (OUTPATIENT)
Dept: OPHTHALMOLOGY | Facility: CLINIC | Age: 68
End: 2023-01-25

## 2023-01-25 ENCOUNTER — HOSPITAL ENCOUNTER (OUTPATIENT)
Facility: CLINIC | Age: 68
End: 2023-01-25
Attending: OPHTHALMOLOGY | Admitting: OPHTHALMOLOGY
Payer: COMMERCIAL

## 2023-01-25 DIAGNOSIS — H25.11 NUCLEAR SENILE CATARACT OF RIGHT EYE: ICD-10-CM

## 2023-01-25 DIAGNOSIS — E11.3513 TYPE 2 DIABETES MELLITUS WITH PROLIFERATIVE RETINOPATHY OF BOTH EYES AND MACULAR EDEMA, UNSPECIFIED WHETHER LONG TERM INSULIN USE (H): Primary | ICD-10-CM

## 2023-01-25 PROCEDURE — G0463 HOSPITAL OUTPT CLINIC VISIT: HCPCS | Mod: 25

## 2023-01-25 PROCEDURE — 99214 OFFICE O/P EST MOD 30 MIN: CPT | Mod: 25 | Performed by: OPHTHALMOLOGY

## 2023-01-25 PROCEDURE — 92235 FLUORESCEIN ANGRPH MLTIFRAME: CPT | Performed by: OPHTHALMOLOGY

## 2023-01-25 PROCEDURE — 76519 ECHO EXAM OF EYE: CPT | Performed by: OPHTHALMOLOGY

## 2023-01-25 PROCEDURE — 92134 CPTRZ OPH DX IMG PST SGM RTA: CPT | Performed by: OPHTHALMOLOGY

## 2023-01-25 PROCEDURE — 250N000011 HC RX IP 250 OP 636: Performed by: OPHTHALMOLOGY

## 2023-01-25 PROCEDURE — 67028 INJECTION EYE DRUG: CPT | Mod: RT | Performed by: OPHTHALMOLOGY

## 2023-01-25 RX ADMIN — Medication 1.25 MG: at 11:53

## 2023-01-25 ASSESSMENT — CUP TO DISC RATIO: OD_RATIO: 0.25

## 2023-01-25 ASSESSMENT — EXTERNAL EXAM - RIGHT EYE: OD_EXAM: NORMAL

## 2023-01-25 ASSESSMENT — SLIT LAMP EXAM - LIDS
COMMENTS: NORMAL
COMMENTS: NORMAL

## 2023-01-25 ASSESSMENT — EXTERNAL EXAM - LEFT EYE: OS_EXAM: NORMAL

## 2023-01-25 ASSESSMENT — CONF VISUAL FIELD
OD_SUPERIOR_NASAL_RESTRICTION: 1
OS_SUPERIOR_TEMPORAL_RESTRICTION: 3
OS_INFERIOR_NASAL_RESTRICTION: 1
OD_INFERIOR_NASAL_RESTRICTION: 1
OS_INFERIOR_TEMPORAL_RESTRICTION: 1
OD_INFERIOR_TEMPORAL_RESTRICTION: 3
OS_SUPERIOR_NASAL_RESTRICTION: 1
OD_SUPERIOR_TEMPORAL_RESTRICTION: 3

## 2023-01-25 ASSESSMENT — VISUAL ACUITY
OD_PH_SC: 20/40-2
METHOD: SNELLEN - LINEAR
OD_SC: 20/50-2

## 2023-01-25 ASSESSMENT — TONOMETRY
OD_IOP_MMHG: 17
IOP_METHOD: TONOPEN
OS_IOP_MMHG: 12

## 2023-01-25 NOTE — TELEPHONE ENCOUNTER
Patient is scheduled for surgery with Dr. Triny Bunch     Spoke with: Harry     Date of Surgery: 02/07/23     Location: [facility]:  [address] [place] [id number]     H&P will be completed at: PAC 02/02/23     Post Op scheduled on 02/08, and 02/16     Surgery packet was given in clinic     Additional comments: Advised RN will call 1 - 3 business days prior with arrival time and instructions. Informed patient they will need an adult  and responsible adult to stay with for 24 hours following surgery

## 2023-01-25 NOTE — PROGRESS NOTES
CC: Follow up proliferative diabetic retinopathy and slowly clearing vitreous hemorrhage right eye     Interval: Patient reports stable vision in both eyes since last visit. He denies new flashes of light or floaters in the right eye.   Lab Results   Component Value Date    A1C 7.4 03/14/2022    A1C 7.3 11/17/2021       HPI: 67 year old man PMHx of CAD (s/p 3v CABG 2008), ischemic CM, now s/p orthotopic heart transplant 07/2020, CKD, HTN, HLD, obesity, CHANTEL and IDDM2 with history of PDR both eyes who presented to [facility] in 05/17/2019 with bilateral vitreous hemorrhages.     His left eye is s/p PPV/MP/retinectomy 12/21/20. 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   There was NVI 06/02/2022    Eye drops  Latanoprost at bedtime both eyes   PredForte every other day left eye    RETINAL IMAGING  Optical Coherence Tomography: 01/25/23 Right eye: interval resolution in central IR  Left eye: Irregular retina; ERM; nasal to fovea with large bullous edema, temporal with subretinal fibrosis / trace edema, Atrophic thinned retina IT macula. = stable     FA 01/25/23   right eye: PRP scars. Foci of NV superonasal.     Assessment and Plan  # Proliferative Diabetic Retinopathy, right eye    # Vitreous hemorrhage, right eye -  Resolving (onset 4/2021)   - Responded to multiple Avastin, nearly resolved 08/04/2022   - R/B/A Fill-in PRP right eye 08/24/2022: #764 spots   -  09/14/2022 last Avastin right eye     # Proliferative Diabetic Retinopathy, left eye     - New NVI, left eye 6/2/2022   - Gonio 6/2/2022 broad PAS superiorly ~3 clock hours, narrow PAS inferiorly <1 clock hour, no NVA   - Regressing 08/04/2022   - IOP 08/04/2022 15   - s/p PRP 6/5/2019   - s/p avastin last injection 06/02/2022   - Observe today, return to clinic 6 weeks possible Avastin left eye      # Funnel RD left eye   - progressed to funnel RD after loss to follow up given heart surgeries     - w retina folded on itself under SO   -  guarded prognosis discussed   - Continue PredForte every other day left eye    # Ocuar HTN, both eyes    - Continue Latanoprost at bedtime both eyes    - IOP wnl     # Visually significant cataract right eye    - IOL calcs obtained and A-scan completed 3/10/2022   - improve CME prior to CEIOL    - CE/IOL agreed 06/02/2022, now pending scheduling   - Schedule surgery    Post-op apps needed: 1day; 1 week; 4 weeks  Multi surgeon case: No   H&P completed by (primary care physician/PAC):   needed:     Other considerations regarding Cataract extraction intraocular lens   Visually significant:YES  Glare: Positive   Reports impacts ADL   Dilation:7  Iris expansion: May need Malyugin  Pseudoexfoliation: NO  Trypan Blue: Yes  Phacodonesis: No  Cornea guttae: rare  Aim for:-0.50  Start artificial tears twice a day and as needed    Anesthesia: peribulbar block  Candidate for multifocal or toric IOL: NO  Anticoagulants: NO  Alpha blockers/Flomax: NO  Surgical time: 60 min    I reviewed the indications, risks, benefits, and alternatives of the proposed surgical procedure. We discussed at length cataracts and the effect of the cataracts on vision.   We discussed the fact that modern cataract surgery is usually successful at alleviating symptoms of glare when the cataract is the causative factor. Other risks were discussed with patient including, but not limited to, failure to improve vision or further loss of vision,  and need for additional surgery, bleeding, infection, loss of vision and the remote possibility of complications of anesthesia. 1:1000 risk of infection/bleed/loss of eye; 1:100 risk of RD and need for further surgery. Patient agreed to proceed with surgery.  I provided multiple opportunities for the questions, answered all questions to the best of my ability, and confirmed that my answers and my discussion were understood.         RTC POD1    ~~~~~~~~~~~~~~~~~~~~~~~~~~~~~~~~~~   Complete documentation of  historical and exam elements from today's encounter can be found in the full encounter summary report (not reduplicated in this progress note).  I personally obtained the chief complaint(s) and history of present illness.  I confirmed and edited as necessary the review of systems, past medical/surgical history, family history, social history, and examination findings as documented by others; and I examined the patient myself.  I personally reviewed the relevant tests, images, and reports as documented above.  I formulated and edited as necessary the assessment and plan and discussed the findings and management plan with the patient and family and No resident or fellow assisted with the procedures performed.  I performed the procedures myself.    Triny Bunch MD  Professor of Ophthalmology.  Retina Service   Department of Ophthalmology and Visual Neurosciences   [facility]  Phone: [phone]   Fax: [phone]

## 2023-01-26 NOTE — TELEPHONE ENCOUNTER
FUTURE VISIT INFORMATION      SURGERY INFORMATION:    Date: 23    Location:  or    Surgeon:  Triny Bunch MD    Anesthesia Type:  MAC with Block    Procedure: RIGHT EYE PHACOEMULSIFICATION, CATARACT, WITH INTRAOCULAR LENS IMPLANT with trypan / possible malyugin    Consult: ov 23    RECORDS REQUESTED FROM:       Primary Care Provider: Anna [facility]ule[facility]-C- [facility]    Pertinent Medical History: CHANTEL, hypertension, CHF, Chronic systolic heart failure, atrial flutter    Most recent EKG+ Tracin22    Most recent ECHO: 22    Most recent Cardiac Stress Test: 22    Most recent Coronary Angiogram: 22    Most recent PFT's: 19

## 2023-02-01 ENCOUNTER — APPOINTMENT (OUTPATIENT)
Dept: INTERPRETER SERVICES | Facility: CLINIC | Age: 68
End: 2023-02-01
Payer: COMMERCIAL

## 2023-02-01 ENCOUNTER — TELEPHONE (OUTPATIENT)
Dept: OPHTHALMOLOGY | Facility: CLINIC | Age: 68
End: 2023-02-01
Payer: COMMERCIAL

## 2023-02-01 NOTE — TELEPHONE ENCOUNTER
Called to reschedule surgery date and location based on staff message from Dr. Triny Bunch and PAC provider.     Patient is scheduled for surgery with Dr. Triny Bunch     Spoke with: Lucian and      Date of Surgery: 02/06/23     Location: [facility]:  [address] [place] [id number]     H&P will be completed at: PAC 02/02     Post Op scheduled on 02/07 and 02/16      Surgery packet was given in clinic last week.     Additional comments: Advised RN will call 1 - 3 business days prior with arrival time and instructions. Informed patient they will need an adult  and responsible adult to stay with for 24 hours following surgery

## 2023-02-02 ENCOUNTER — ANESTHESIA EVENT (OUTPATIENT)
Dept: SURGERY | Facility: CLINIC | Age: 68
End: 2023-02-02
Payer: COMMERCIAL

## 2023-02-02 ENCOUNTER — LAB (OUTPATIENT)
Dept: LAB | Facility: CLINIC | Age: 68
End: 2023-02-02
Payer: COMMERCIAL

## 2023-02-02 ENCOUNTER — OFFICE VISIT (OUTPATIENT)
Dept: SURGERY | Facility: CLINIC | Age: 68
End: 2023-02-02
Payer: COMMERCIAL

## 2023-02-02 ENCOUNTER — PRE VISIT (OUTPATIENT)
Dept: SURGERY | Facility: CLINIC | Age: 68
End: 2023-02-02

## 2023-02-02 VITALS
BODY MASS INDEX: 29.73 KG/M2 | DIASTOLIC BLOOD PRESSURE: 80 MMHG | RESPIRATION RATE: 16 BRPM | TEMPERATURE: 97.9 F | WEIGHT: 185 LBS | HEIGHT: 66 IN | OXYGEN SATURATION: 97 % | SYSTOLIC BLOOD PRESSURE: 127 MMHG | HEART RATE: 94 BPM

## 2023-02-02 DIAGNOSIS — H25.11 NUCLEAR SENILE CATARACT OF RIGHT EYE: ICD-10-CM

## 2023-02-02 DIAGNOSIS — Z12.5 PROSTATE CANCER SCREENING: ICD-10-CM

## 2023-02-02 DIAGNOSIS — Z01.818 PREOP EXAMINATION: Primary | ICD-10-CM

## 2023-02-02 DIAGNOSIS — E11.65 TYPE 2 DIABETES MELLITUS WITH HYPERGLYCEMIA, WITH LONG-TERM CURRENT USE OF INSULIN (H): ICD-10-CM

## 2023-02-02 DIAGNOSIS — Z13.220 LIPID SCREENING: ICD-10-CM

## 2023-02-02 DIAGNOSIS — Z79.4 TYPE 2 DIABETES MELLITUS WITH HYPERGLYCEMIA, WITH LONG-TERM CURRENT USE OF INSULIN (H): ICD-10-CM

## 2023-02-02 DIAGNOSIS — E11.21 TYPE 2 DIABETES MELLITUS WITH DIABETIC NEPHROPATHY, WITH LONG-TERM CURRENT USE OF INSULIN (H): ICD-10-CM

## 2023-02-02 DIAGNOSIS — Z01.818 PREOP EXAMINATION: ICD-10-CM

## 2023-02-02 DIAGNOSIS — Z94.1 HEART REPLACED BY TRANSPLANT (H): ICD-10-CM

## 2023-02-02 DIAGNOSIS — Z79.4 TYPE 2 DIABETES MELLITUS WITH DIABETIC NEPHROPATHY, WITH LONG-TERM CURRENT USE OF INSULIN (H): ICD-10-CM

## 2023-02-02 LAB
ALBUMIN SERPL BCG-MCNC: 4 G/DL (ref 3.5–5.2)
ALP SERPL-CCNC: 128 U/L (ref 40–129)
ALT SERPL W P-5'-P-CCNC: 19 U/L (ref 10–50)
ANION GAP SERPL CALCULATED.3IONS-SCNC: 8 MMOL/L (ref 7–15)
AST SERPL W P-5'-P-CCNC: 23 U/L (ref 10–50)
BASOPHILS # BLD AUTO: 0 10E3/UL (ref 0–0.2)
BASOPHILS NFR BLD AUTO: 0 %
BILIRUB SERPL-MCNC: 0.3 MG/DL
BUN SERPL-MCNC: 27.6 MG/DL (ref 8–23)
CALCIUM SERPL-MCNC: 9.1 MG/DL (ref 8.8–10.2)
CHLORIDE SERPL-SCNC: 102 MMOL/L (ref 98–107)
CHOLEST SERPL-MCNC: 119 MG/DL
CK SERPL-CCNC: 100 U/L (ref 39–308)
CREAT SERPL-MCNC: 2.26 MG/DL (ref 0.67–1.17)
DEPRECATED HCO3 PLAS-SCNC: 27 MMOL/L (ref 22–29)
EOSINOPHIL # BLD AUTO: 0 10E3/UL (ref 0–0.7)
EOSINOPHIL NFR BLD AUTO: 1 %
ERYTHROCYTE [DISTWIDTH] IN BLOOD BY AUTOMATED COUNT: 14.9 % (ref 10–15)
GFR SERPL CREATININE-BSD FRML MDRD: 31 ML/MIN/1.73M2
GLUCOSE SERPL-MCNC: 134 MG/DL (ref 70–99)
HBA1C MFR BLD: 8.3 %
HCT VFR BLD AUTO: 37.4 % (ref 40–53)
HDLC SERPL-MCNC: 32 MG/DL
HGB BLD-MCNC: 12.1 G/DL (ref 13.3–17.7)
HOLD SPECIMEN: NORMAL
IMM GRANULOCYTES # BLD: 0 10E3/UL
IMM GRANULOCYTES NFR BLD: 0 %
LDLC SERPL CALC-MCNC: 14 MG/DL
LYMPHOCYTES # BLD AUTO: 1.3 10E3/UL (ref 0.8–5.3)
LYMPHOCYTES NFR BLD AUTO: 21 %
MAGNESIUM SERPL-MCNC: 1.8 MG/DL (ref 1.7–2.3)
MCH RBC QN AUTO: 27.9 PG (ref 26.5–33)
MCHC RBC AUTO-ENTMCNC: 32.4 G/DL (ref 31.5–36.5)
MCV RBC AUTO: 86 FL (ref 78–100)
MONOCYTES # BLD AUTO: 0.5 10E3/UL (ref 0–1.3)
MONOCYTES NFR BLD AUTO: 8 %
NEUTROPHILS # BLD AUTO: 4.3 10E3/UL (ref 1.6–8.3)
NEUTROPHILS NFR BLD AUTO: 70 %
NONHDLC SERPL-MCNC: 87 MG/DL
NRBC # BLD AUTO: 0 10E3/UL
NRBC BLD AUTO-RTO: 0 /100
PHOSPHATE SERPL-MCNC: 3 MG/DL (ref 2.5–4.5)
PLATELET # BLD AUTO: 180 10E3/UL (ref 150–450)
POTASSIUM SERPL-SCNC: 4.5 MMOL/L (ref 3.4–5.3)
PROT SERPL-MCNC: 6.7 G/DL (ref 6.4–8.3)
PSA SERPL-MCNC: 0.3 NG/ML (ref 0–4.5)
RBC # BLD AUTO: 4.34 10E6/UL (ref 4.4–5.9)
SODIUM SERPL-SCNC: 137 MMOL/L (ref 136–145)
TRIGL SERPL-MCNC: 365 MG/DL
WBC # BLD AUTO: 6.1 10E3/UL (ref 4–11)

## 2023-02-02 PROCEDURE — 83036 HEMOGLOBIN GLYCOSYLATED A1C: CPT | Performed by: PHYSICIAN ASSISTANT

## 2023-02-02 PROCEDURE — 83735 ASSAY OF MAGNESIUM: CPT | Performed by: PATHOLOGY

## 2023-02-02 PROCEDURE — 84100 ASSAY OF PHOSPHORUS: CPT | Performed by: PATHOLOGY

## 2023-02-02 PROCEDURE — 99204 OFFICE O/P NEW MOD 45 MIN: CPT | Performed by: NURSE PRACTITIONER

## 2023-02-02 PROCEDURE — 82550 ASSAY OF CK (CPK): CPT | Performed by: PATHOLOGY

## 2023-02-02 PROCEDURE — 36415 COLL VENOUS BLD VENIPUNCTURE: CPT | Performed by: PATHOLOGY

## 2023-02-02 PROCEDURE — 86352 CELL FUNCTION ASSAY W/STIM: CPT | Performed by: INTERNAL MEDICINE

## 2023-02-02 PROCEDURE — 80061 LIPID PANEL: CPT | Performed by: PATHOLOGY

## 2023-02-02 PROCEDURE — 87799 DETECT AGENT NOS DNA QUANT: CPT | Performed by: INTERNAL MEDICINE

## 2023-02-02 PROCEDURE — G0103 PSA SCREENING: HCPCS | Performed by: PATHOLOGY

## 2023-02-02 PROCEDURE — 85025 COMPLETE CBC W/AUTO DIFF WBC: CPT | Performed by: PATHOLOGY

## 2023-02-02 PROCEDURE — 80053 COMPREHEN METABOLIC PANEL: CPT | Performed by: PATHOLOGY

## 2023-02-02 ASSESSMENT — PAIN SCALES - GENERAL: PAINLEVEL: NO PAIN (0)

## 2023-02-02 ASSESSMENT — COPD QUESTIONNAIRES: COPD: 0

## 2023-02-02 ASSESSMENT — ENCOUNTER SYMPTOMS
SEIZURES: 0
ORTHOPNEA: 0

## 2023-02-02 ASSESSMENT — LIFESTYLE VARIABLES: TOBACCO_USE: 0

## 2023-02-02 NOTE — PATIENT INSTRUCTIONS
Preparing for Your Surgery      Name:  Lucian Oliveira   MRN:  [id number]   :  1955   Today's Date:  2023       Arriving for surgery:  Surgery date:  23  Arrival time:  1:35 pm     Surgeries and procedures: Adult patients can have 2 visitors all through the surgery process.   Visiting hours: 8 a.m. to 8:30 p.m.   Hospital: Adult patients and children under age 18 can have 4 visitor at a time     No visitors under the age of 5 are allowed for hospital patients.  Double occupancy rooms: Patients can have only two visitors at a time.   Patients with disabilities: Can have a support person with them (family member, service provider     Or someone well informed about their needs) plus the allowed number of visitors   Patients confirmed or suspected to have symptoms of COVID 19 or flu:     No visitors allowed for adult patients.   Children (under age 18) can have 1 named visitor.   People who are sick or showing symptoms of COVID 19 or flu:    Are not allowed to visit patients--we can only make exceptions in special situations.     Please follow these guidelines for your visit:   Arrive wearing a mask over your mouth and nose; we will give you a medical mask to wear    If you arrive wearing a cloth mask.   Keep it on during your entire visit, even when in patient's room.   If you don't wear a mask we'll ask you to leave.   Clean your hands with alcohol hand . Do this when you arrive at and leave the building and patient room,    And again after you touch your mask or anything in the room.   You can t visit if you have a fever, cough, shortness of breath, muscle aches, headaches, sore throat    Or diarrhea    Stay 6 feet away from others during your visit and between visits   Go directly to and from the room you are visiting.   Stay in the patient s room during your visit. Limit going to other places in the hospital as much as possible   Leave bags and jackets at home or in the car.   For  everyone s health, please don t come and go during your visit. That includes for smoking   during your visit.     Please come to:     [facility] [address]  [address] [place]  [address]    - parking is available in front of [facility] from 5:15AM to 8:00PM. If you prefer, park your car in the Green Lot.    -Proceed to the 3rd floor, check in at the Adult Surgery Waiting Lounge. [phone]    If an escort is needed stop at the Information Desk in the lobby. Inform the information person that you are here for surgery. An escort to the Adult Surgery Waiting Lounge will be provided.    What can I eat or drink?  -  You may eat and drink normally up to 8 hours prior to arrival time. (Until 5:35 am)  -  You may have clear liquids until 2 hours prior to arrival time. (Until 11:35 am)    Examples of clear liquids:  Water  Clear broth  Juices (apple, white grape, white cranberry  and cider) without pulp  Noncarbonated, powder based beverages  (lemonade and Nicho-Aid)  Sodas (Sprite, 7-Up, ginger ale and seltzer)  Coffee or tea (without milk or cream)  Gatorade    -  No Alcohol for at least 24 hours before surgery.     Which medicines can I take?    Hold Aspirin for 7 days before surgery.   Hold Multivitamins for 7 days before surgery.  Hold Supplements for 7 days before surgery.  Hold Ibuprofen (Advil, Motrin) for 1 day before surgery--unless otherwise directed by surgeon.  Hold Naproxen (Aleve) for 4 days before surgery.    Hold all NSAIDS for 7 days before spine surgery. (Ibuprofen, Naproxen, Celebrex, Indocin, Diclofenac.)    -  DO NOT take these medications the day of surgery:  Aspirin - stop 7 days before surgery  Calcium+D  Dulagluride (Trulicity) to be held if it is scheduled the morning of surgery. If it scheduled a different day it may be taken as usual  Furosemide (Lasix)  Humalog  Magnesium  Senna-docusate    -  PLEASE TAKE  these medications the day of surgery:  Tylenol if needed  Latanoprost (Xalatan) eye drops  Lisinopril (Zestril)  Take 80% of your usual dose of NPH insulin - take 32 units instead of 40 units the day of surgery  Pantoprazole (Protonix)  Prednisolone (Pred Forte) eye drops  Rosuvastatin (Crestor)  Sirolimus  Sulfamethoxazole-Trimethoprim (Bactrim) if scheduled  Tacrolimus  Tamsulosin (Flomax)    How do I prepare myself?  - Please take 2 showers before surgery using Scrubcare or Hibiclens soap.    Use this soap only from the neck to your toes.     Leave the soap on your skin for one minute--then rinse thoroughly.      You may use your own shampoo and conditioner. No other hair products.   - Please remove all jewelry and body piercings.  - No lotions, deodorants or fragrance.  - No makeup or fingernail polish.   - Bring your ID and insurance card.    -If you have a Deep Brain Stimulator, Spinal Cord Stimulator, or any Neuro Stimulator device---you must bring the remote control to the hospital.      ALL PATIENTS GOING HOME THE SAME DAY OF SURGERY ARE REQUIRED TO HAVE A RESPONSIBLE ADULT TO DRIVE AND BE IN ATTENDANCE WITH THEM FOR 24 HOURS FOLLOWING SURGERY.    Covid testing policy as of 12/06/2022  Your surgeon will notify and schedule you for a COVID test if one is needed before surgery--please direct any questions or COVID symptoms to your surgeon      Questions or Concerns:    - For any questions regarding the day of surgery or your hospital stay, please contact the Pre Admission Nursing Office at [phone].     - If you have health changes between today and your surgery, please call your surgeon.     - For questions after surgery, please call your surgeons office.

## 2023-02-02 NOTE — H&P
Pre-Operative H & P     CC:  Preoperative exam to assess for increased cardiopulmonary risk while undergoing surgery and anesthesia.    Date of Encounter: 2/2/2023  Primary Care Physician:  No Ref-Primary, Physician     Reason for visit:   Encounter Diagnoses   Name Primary?     Preop examination Yes     Nuclear senile cataract of right eye        HPI  Lucian Oliveira is a 67 year old male who presents for pre-operative H & P in preparation for  Procedure Information     Case: [id number] Date/Time: 02/06/23 1535    Procedure: RIGHT EYE PHACOEMULSIFICATION, CATARACT, WITH INTRAOCULAR LENS IMPLANT with trypan / possible malyugin (Right: Eye)    Anesthesia type: MAC with Block    Diagnosis: Nuclear senile cataract of right eye [H25.11]    Pre-op diagnosis: Nuclear senile cataract of right eye [H25.11]    [facility]: [facility]    Providers: Triny Bunch MD          Lucian Oliveirais a 66 y/o male with a PMH significant for CAD (s/p 3v CABG 2008), ischemic CM, now s/p orthotopic heart transplant 7/2020, CKD, HTN, HLD, obesity, CHANTEL, and insulin-dependent type II DM, and proliferative diabetic retinopathy bilaterally who has a visually significant cataract of the right eye. He consulted with Dr. Bunch on 1/25/23 and the above surgery has been recommended.      History is obtained from the patient and chart review    Hx of abnormal bleeding or anti-platelet use: On Aspirin 81mg, hx of HIT during previous hospitalization      Past Medical History  Past Medical History:   Diagnosis Date     CAD (coronary artery disease)      CHF (congestive heart failure) (H)      CKD (chronic kidney disease), stage III (H)      Cortical cataract of both eyes      Diabetes (H)      Hyperlipidemia      Hypertension      Infection due to Streptococcus mitis group 09/23/2020     Ischemic cardiomyopathy      Obesity      CHANTEL (obstructive sleep apnea)     occas cpap     CHANTEL (obstructive sleep apnea)- severe (AHI 30)       Osteoarthritis        Past Surgical History  Past Surgical History:   Procedure Laterality Date     CATARACT IOL, RT/LT       COLONOSCOPY N/A 8/7/2019    Procedure: COLONOSCOPY, WITH POLYPECTOMY AND BIOPSY;  Surgeon: Chauncey Morataya MD;  Location:  GI     CV ANGIOGRAM CORONARY GRAFT N/A 7/2/2020    Procedure: Angiogram Coronary Graft;  Surgeon: Alex Lantigua MD;  [facility]     CV CORONARY ANGIOGRAM N/A 7/2/2020    Procedure: CV CORONARY ANGIOGRAM;  Surgeon: Alex Lantigua MD;  [facility]     CV CORONARY ANGIOGRAM N/A 7/20/2021    Procedure: CV CORONARY ANGIOGRAM;  Surgeon: Raimundo Hudson MD;  Location:  [facility]     CV CORONARY ANGIOGRAM N/A 9/12/2022    Procedure: Coronary Angiogram- BIPLANE;  Surgeon: Raimundo Hudson MD;  [facility]     CV HEART BIOPSY N/A 7/27/2020    Procedure: Heart Biopsy;  Surgeon: Mario Burr MD;  [facility]     CV HEART BIOPSY N/A 8/3/2020    Procedure: CV HEART BIOPSY;  Surgeon: Alex Lantigua MD;  [facility]     CV HEART BIOPSY N/A 8/10/2020    Procedure: CV HEART BIOPSY;  Surgeon: Mario Burr MD;  [facility]     CV HEART BIOPSY N/A 8/17/2020    Procedure: CV HEART BIOPSY;  Surgeon: Raimundo Hudson MD;  [facility]     CV HEART BIOPSY N/A 8/31/2020    Procedure: CV HEART BIOPSY;  Surgeon: Moises Santos MD;  [facility]     CV HEART BIOPSY N/A 9/14/2020    Procedure: CV HEART BIOPSY;  Surgeon: Raimundo Hudson MD;  [facility]     CV HEART BIOPSY N/A 9/28/2020    Procedure: CV HEART BIOPSY;  Surgeon: Raimundo Hudson MD;  [facility]     CV HEART BIOPSY N/A 10/12/2020    Procedure: CV HEART BIOPSY;   Surgeon: John Stuart MD;  Location:  [facility]     CV HEART BIOPSY N/A 11/10/2020    Procedure: CV HEART BIOPSY;  Surgeon: John Stuart MD;  [facility]     CV HEART BIOPSY N/A 12/7/2020    Procedure: CV HEART BIOPSY;  Surgeon: Raimundo Hudson MD;  Location:  [facility]     CV HEART BIOPSY N/A 1/5/2021    Procedure: CV HEART BIOPSY;  Surgeon: Raimundo Hudson MD;  Location:  [facility]     CV HEART BIOPSY N/A 7/20/2021    Procedure: CV HEART BIOPSY;  Surgeon: Raimundo Hudson MD;  Location:  [facility]     CV HEART BIOPSY N/A 9/28/2021    Procedure: CV HEART BIOPSY;  Surgeon: Raimundo Hudson MD;  Location:  [facility]     CV HEART BIOPSY N/A 9/12/2022    Procedure: Heart Biopsy;  Surgeon: Raimundo Hudson MD;  Location:  [facility]     CV INTRA AORTIC BALLOON N/A 7/14/2020    Procedure: RHC WITH LEAVE IN SWAN/IABP;  Surgeon: Sonny Saleh MD;  Location:  [facility]     CV INTRAVASULAR ULTRASOUND N/A 9/12/2022    Procedure: Intravascular Ultrasound;  Surgeon: Raimundo Hudson MD;  Location:  [facility]     CV RIGHT HEART CATH MEASUREMENTS RECORDED N/A 3/25/2019    Procedure: CV RIGHT HEART CATH;  Surgeon: Moises Santos MD;  Location:  [facility]ATH [facility]     CV RIGHT HEART CATH MEASUREMENTS RECORDED N/A 7/10/2019    Procedure: CV RIGHT HEART CATH;  Surgeon: Jak Mccabe MD;  Location:  [facility] CATH LAB     CV RIGHT HEART CATH MEASUREMENTS RECORDED N/A 7/8/2020    Procedure: Right Heart Cath with Leave In swan already has ICU load;  Surgeon: Jak Mccabe MD;  Location:  [facility]     CV RIGHT HEART CATH MEASUREMENTS RECORDED N/A 7/14/2020    Procedure: CV RIGHT HEART CATH;  Surgeon: Sonny Saleh MD;  Location:  [facility]      CV RIGHT HEART CATH MEASUREMENTS RECORDED N/A 7/2/2020    Procedure: CV RIGHT HEART CATH;  Surgeon: Alex Lantigua MD;  [facility]     CV RIGHT HEART CATH MEASUREMENTS RECORDED N/A 7/27/2020    Procedure: Right Heart Cath;  Surgeon: Mario Burr MD;  [facility]     CV RIGHT HEART CATH MEASUREMENTS RECORDED N/A 8/3/2020    Procedure: Right Heart Cath;  Surgeon: Alex Lantigua MD;  Location:  [facility]     CV RIGHT HEART CATH MEASUREMENTS RECORDED N/A 8/10/2020    Procedure: CV RIGHT HEART CATH;  Surgeon: Mario Burr MD;  Location:  [facility]     CV RIGHT HEART CATH MEASUREMENTS RECORDED N/A 8/17/2020    Procedure: CV RIGHT HEART CATH;  Surgeon: Raimundo Hudson MD;  Location:  [facility]     CV RIGHT HEART CATH MEASUREMENTS RECORDED N/A 8/31/2020    Procedure: CV RIGHT HEART CATH;  Surgeon: Moises Santos MD;  Location:  [facility]     CV RIGHT HEART CATH MEASUREMENTS RECORDED N/A 9/14/2020    Procedure: CV RIGHT HEART CATH;  Surgeon: Raimundo Hudson MD;  Location:  [facility]     CV RIGHT HEART CATH MEASUREMENTS RECORDED N/A 9/28/2020    Procedure: CV RIGHT HEART CATH;  Surgeon: Raimundo Hudson MD;  Location:  [facility]     CV RIGHT HEART CATH MEASUREMENTS RECORDED N/A 10/12/2020    Procedure: CV RIGHT HEART CATH;  Surgeon: John Stuart MD;  Location:  [facility]     CV RIGHT HEART CATH MEASUREMENTS RECORDED N/A 11/10/2020    Procedure: CV RIGHT HEART CATH;  Surgeon: John Stuart MD;  [facility]     CV RIGHT HEART CATH MEASUREMENTS RECORDED N/A 12/7/2020    Procedure: CV RIGHT HEART CATH;  Surgeon: Raimundo Hudson MD;  Location:  [facility]     CV RIGHT HEART CATH MEASUREMENTS RECORDED N/A 1/5/2021    Procedure: CV RIGHT HEART  CATH;  Surgeon: Raimundo Hudson MD;  Location:  [facility]     CV RIGHT HEART CATH MEASUREMENTS RECORDED N/A 7/20/2021    Procedure: CV RIGHT HEART CATH;  Surgeon: Raimundo Hudson MD;  Location:  [facility]     CV RIGHT HEART CATH MEASUREMENTS RECORDED N/A 9/28/2021    Procedure: CV RIGHT HEART CATH;  Surgeon: Raimundo Hudson MD;  Location:  [facility]     CV RIGHT HEART CATH MEASUREMENTS RECORDED N/A 9/12/2022    Procedure: Right Heart Catheterization;  Surgeon: Raimundo Hudson MD;  Location:  [facility]     EXTRACTION(S) DENTAL Left 7/13/2020    Procedure: EXTRACTION, TOOTH #11, 12, 13, 15, and 29;  Surgeon: Monica Chao DDS;  [facility]     IMPLANT AUTOMATIC IMPLANTABLE CARDIOVERTER DEFIBRILLATOR       INCISION AND DRAINAGE STERNUM W/ IRRIGATION SYSTEM, COMBINED N/A 9/23/2020    Procedure: INCISION AND DRAINAGE, LEFT SUPRACLAVICULAR WOUND INFECTION AND ABDOMENN WOUND.;  Surgeon: Ran Huertas MD;  [facility]     INSERT INTRAAORTIC BALLOON PUMP N/A 7/16/2020    Procedure: Subclavian Intra-Aortic Balloon Pump Placement;  Surgeon: Allan Sparrow MD;  [facility]     IRRIGATION AND DEBRIDEMENT CHEST WASHOUT, COMBINED N/A 9/28/2020    Procedure: IRRIGATION AND DEBRIDEMENT OF LEFT UPPER CHEST WOUND.;  Surgeon: Ran Huertas MD;  [facility]     PHACOEMULSIFICATION CLEAR CORNEA WITH STANDARD IOL, VITRECTOMY PARSPLANA 25 GAGUE, COMBINED Left 12/10/2019    Procedure: PHACOEMULSIFICATION, CATARACT, CLEAR CORNEAL INCISION APPROACH, W STD INTRAOCULAR LENS IMPLANT INSERT + VITRECTOMY BY PARS PLANA  USING 25-GAUGE INSTRUMENTS. ENDOLASER, INFUSION OF 20% SF6 GAS;  Surgeon: Triny Bunch MD;  [facility]     PICC DOUBLE LUMEN PLACEMENT Left 07/28/2020    5Fr - 42cm, Basilic vein, mid SVC     PICC INSERTION Right 07/11/2020    basilic 44 cm total      TRANSPLANT HEART RECIPIENT  N/A 7/19/2020    Procedure: REDO MEDIAN STERNOTOMY, TRANSPLANT, ORTHOTOPIC HEART, RECIPIENT, ON PUMP OXYGENATOR, REMOVAL OF CARDIAC DEFIBRILLATOR AND LEAD;  Surgeon: Griselli, Massimo, MD;  [facility]     VITRECTOMY, PARS PLANA APPROACH, USING 27-GAUGE INSTRUMENTS Left 12/21/2020    Procedure: 27 gauge pars plana vitectomy, membrane peel, perfluoroctane liquid (PFO), retinectomy, endolaser, Silicone Oil 1000 cs;  Surgeon: Triny Bunch MD;  Location:  OR     [facility] CABG, ARTERY-VEIN, THREE  02/2008       Prior to Admission Medications  Current Outpatient Medications   Medication Sig Dispense Refill     acetaminophen (TYLENOL) 325 MG tablet Take 3 tablets (975 mg) by mouth every 8 hours as needed for mild pain 60 tablet 3     aspirin (ASA) 81 MG chewable tablet Take 1 tablet (81 mg) by mouth daily (Patient taking differently: Take 81 mg by mouth every morning) 120 tablet 0     Calcium Carb-Cholecalciferol (CALCIUM CARBONATE-VITAMIN D3) 600-400 MG-UNIT TABS TAKE ONE TABLET BY MOUTH TWICE A DAY WITH MEALS 180 tablet 3     Dulaglutide (TRULICITY) 4.5 MG/0.5ML SOPN Inject 4.5 mg Subcutaneous once a week 6 mL 1     furosemide (LASIX) 20 MG tablet Take 1 tablet (20 mg) by mouth daily (Patient taking differently: Take 20 mg by mouth every morning) 90 tablet 3     HUMALOG KWIKPEN 100 UNIT/ML soln Inject 8-14 Units Subcutaneous 3 times daily (before meals) -199 give 8 units. -229 give 9 units. -259 give 10 units. -289 give 11 units, 290 - 319 - 12 units, 320 - 349 - 13 units, >350 - 14 units 15 mL 1     insulin  UNIT/ML injection GIve 40 units each morning and 12-16  units each evening BEFORE meals (Patient taking differently: Inject 40 Units Subcutaneous 2 times daily GIve 40 units each morning and 12 at night) 60 mL 3     latanoprost (XALATAN) 0.005 % ophthalmic solution Place 1 drop into both eyes At Bedtime (Patient taking differently: Place 1 drop into the right eye At  Bedtime This was prescribed each eye, he states he is using right eye only) 15 mL 11     lisinopril (ZESTRIL) 10 MG tablet Take 1 tablet (10 mg) by mouth 2 times daily 60 tablet 11     magnesium oxide (MAG-OX) 400 (241.3 Mg) MG tablet TAKE ONE TABLET BY MOUTH TWICE A  tablet 3     MAGNESIUM OXIDE 400 (240 Mg) MG tablet TAKE ONE TABLET BY MOUTH TWICE A  tablet 3     pantoprazole (PROTONIX) 40 MG EC tablet Take 40 mg by mouth every morning       prednisoLONE acetate (PRED FORTE) 1 % ophthalmic suspension Place 1 drop Into the left eye daily 10 mL 2     rosuvastatin (CRESTOR) 10 MG tablet TAKE ONE TABLET BY MOUTH ONCE DAILY (Patient taking differently: Take 10 mg by mouth every morning) 90 tablet 3     senna-docusate (SENOKOT-S/PERICOLACE) 8.6-50 MG tablet Take 1 tablet by mouth 2 times daily as needed (hold for loose stools) 60 tablet 3     sirolimus (GENERIC EQUIVALENT) 0.5 MG tablet TAKE FOUR TABLETS BY MOUTH ONCE DAILY (Patient taking differently: Take 2 mg by mouth daily At noon) 120 tablet 11     sulfamethoxazole-trimethoprim (BACTRIM) 400-80 MG tablet Take 1 tablet by mouth three times a week (Patient taking differently: Take 1 tablet by mouth three times a week AM three times weekly) 36 tablet 3     tacrolimus (GENERIC EQUIVALENT) 1 MG capsule Take 3 capsules (3 mg) by mouth 2 times daily 180 capsule 11     Alcohol Swabs PADS Use to swab the area of the injection or sergio as directed   Per insurance coverage 100 each 0     blood glucose (NO BRAND SPECIFIED) test strip Use to test blood sugar 4 times daily or as directed. 400 strip 3     blood glucose monitoring (ACCU-CHEK FELIPE SMARTVIEW) meter device kit Use to test blood sugar 3-4 times daily, as directed. 1 kit 0     blood glucose monitoring (SOFTCLIX) lancets 1 each 4 times daily Use to test blood sugars 2 times daily. 400 each 8     insulin pen needle (32G X 4 MM) 32G X 4 MM miscellaneous Use 5-6 pen needles daily or as directed. 450 each 3      order for DME Auto CPAP 8-15 cmH20 1 Units 0     prednisoLONE acetate (PRED FORTE) 1 % ophthalmic suspension Place 1-2 drops Into the left eye 2 times daily (Patient taking differently: Place 1 drop Into the left eye every other day) 1 Bottle 1     tamsulosin (FLOMAX) 0.4 MG capsule TAKE ONE CAPSULE BY MOUTH ONCE DAILY (Patient not taking: Reported on 2/2/2023) 90 capsule 3       Allergies  Allergies   Allergen Reactions     Heparin Heparin Induced Thrombocytopenia     HIT screen sent 7/18/2020. CORRINE confirmed positive       Social History  Social History     Socioeconomic History     Marital status:      Spouse name: Not on file     Number of children: 4     Years of education: Not on file     Highest education level: Not on file   Occupational History     Occupation:      Employer: [employer]     Employer: [employer]   Tobacco Use     Smoking status: Never     Smokeless tobacco: Never     Tobacco comments:     Never smoked; non-smoking household   Vaping Use     Vaping Use: Never used   Substance and Sexual Activity     Alcohol use: No     Alcohol/week: 0.0 standard drinks     Drug use: No     Sexual activity: Yes     Partners: Female   Other Topics Concern     Parent/sibling w/ CABG, MI or angioplasty before 65F 55M? No   Social History Narrative     Not on file     Social Determinants of Health     Financial Resource Strain: Not on file   Food Insecurity: Not on file   Transportation Needs: Not on file   Physical Activity: Not on file   Stress: Not on file   Social Connections: Not on file   Intimate Partner Violence: Not on file   Housing Stability: Not on file       Family History  Family History   Problem Relation Age of Onset     Diabetes Sister      Diabetes Sister      Diabetes Brother      Macular Degeneration No family hx of      Glaucoma No family hx of      Myocardial Infarction No family hx of      Kidney Disease No family hx of      Anesthesia Reaction No family hx of       Bleeding Disorder No family hx of      Venous thrombosis No family hx of        Review of Systems  The complete review of systems is negative other than noted in the HPI or here.   Anesthesia Evaluation   Pt has had prior anesthetic. Type: General and MAC.    No history of anesthetic complications       ROS/MED HX  ENT/Pulmonary: Comment: Has not used CPAP in about a month. Pt and wife deny current CHANTEL sxs off CPAP.    (+) sleep apnea, uses CPAP, CHANTEL risk factors, hypertension,  (-) tobacco use, asthma, COPD and recent URI   Neurologic:  - neg neurologic ROS  (-) no seizures and no CVA   Cardiovascular: Comment: Hx of CAD s/p CABG 2008, now s/p heart transplant (7/2020)    (+) Dyslipidemia hypertension--CAD -CABG-date: 3v CABG 2008. -Taking blood thinners CHF Previous cardiac testing   Echo: Date: 3/14/22 Results:  See H&P  Stress Test:  Date: 9/26/22 Results:  See H&P  ECG Reviewed:  Date: 9/26/22 Results:  See H&P  Cath:  Date: 5/12/22 Results:  See H&P (-) ALBRIGHT, orthopnea/PND and irregular heartbeat/palpitations   METS/Exercise Tolerance:  Comment: Walking on treadmill 30 mins daily flat and with incline without difficulty.   Cycles 20 mins 5-6 x week    Hematologic: Comments: Hx of acute embolism and thrombosis of right axillary vein around time of heart transplant   Hx of HIT  Denies any blood clots since this time. Not on any blood thinners currently.     (+) History of blood clots, anemia, history of blood transfusion, no previous transfusion reaction, - with heart transplant,     Musculoskeletal: Comment: Chronic back pain - takes occ. tylenol      GI/Hepatic:  - neg GI/hepatic ROS  (-) GERD, inflamatory bowel disease and liver disease   Renal/Genitourinary:     (+) renal disease, type: CRI, BPH,     Endo:     (+) type II DM, Using insulin, - not using insulin pump. Normal glucose range: averages 110 in AM, 170-180 in evenings, not previously admitted for DM/DKA. Diabetic complications: nephropathy  "retinopathy. Obesity,  (-) thyroid disease and chronic steroid usage   Psychiatric/Substance Use:  - neg psychiatric ROS     Infectious Disease:  - neg infectious disease ROS  (-) Recent Fever   Malignancy:  - neg malignancy ROS     Other:      (+) , H/O Chronic Pain,        /80 (BP Location: Right arm, Patient Position: Sitting, Cuff Size: Adult Large)   Pulse 94   Temp 97.9  F (36.6  C) (Oral)   Resp 16   Ht 1.671 m (5' 5.8\")   Wt 83.9 kg (185 lb)   SpO2 97%   BMI 30.04 kg/m      Physical Exam   Constitutional: Awake, alert, cooperative, no apparent distress, and appears stated age.  Eyes: Pupils equal, round and reactive to light, extra ocular muscles intact, sclera clear, conjunctiva normal.  HENT: Normocephalic, oral pharynx with moist mucus membranes, good dentition. No goiter appreciated.   Respiratory: Clear to auscultation bilaterally, no crackles or wheezing.  Cardiovascular: Regular rate and rhythm, normal S1 and S2, and no murmur noted.  Carotids +2, no bruits. Trace edema in bilateral LEs. Palpable pulses to radial and PT arteries.   GI: Normal bowel sounds, soft, non-distended, non-tender, no masses palpated, no hepatosplenomegaly.   Lymph/Hematologic: No cervical lymphadenopathy and no supraclavicular lymphadenopathy.  Genitourinary: Deferred.   Skin: Warm and dry.  No rashes or infection at anticipated surgical site.   Musculoskeletal: Full ROM of neck. There is no redness, warmth, or swelling of the joints. Gross motor strength is normal.    Neurologic: Awake, alert, oriented to name, place and time. Cranial nerves II-XII are grossly intact. Gait is normal.   Neuropsychiatric: Calm, cooperative. Normal affect.     Prior Labs/Diagnostic Studies   All labs and imaging personally reviewed       Echo result w/o MOPS (3/14/22):   Interpretation SummaryGlobal and regional left ventricular function is normal with an EF of 60-65%.Right ventricular function, chamber size, wall motion, and " thickness arenormal.Pulmonary artery systolic pressure cannot be assessed.The inferior vena cava is normal.No pericardial effusion is present.No significant changes noted.    EKG (9/26/22):   Sinus rhythm with short NC   Possible Left atrial enlargement   Low voltage QRS   Incomplete right bundle branch block   Cannot rule out Anterior infarct    MR Cardiac with contrast and stress (9/26/22):  CONCLUSIONS: Cardiac transplant. Normal biventricular function with LVEF 74% and RVEF 59%. No evidence of  perfusion defects on Regadenoson stress imaging.  A few tiny foci of mesocardial or epicardial LGE in the  midventricular inferoseptum and anterior segments in a nonischemic pattern. No evidence of MI.    RHC and coronary angio (5/12/22):  Conclusion    Successful endomyocardial biopsy. Results pending.    Right sided filling pressures are mildly elevated.    Mild elevated pulmonary hypertension.    Left sided filling pressures are normal.    Normal cardiac output level.    Ultrasound (IVUS) was performed.    Hemodynamic data has been modified in Epic per physician review.     Angiographically normal coronary arteries. No detectable CAV (PAULINO < 0.1). Minimal donor disease in the left main on IVUS.        PFT Latest Ref Rng & Units 7/9/2019   FVC L 2.25   FEV1 L 1.85   FVC% % 68   FEV1% % 71         The patient's records and results personally reviewed by this provider.     Outside records reviewed from: Care Everywhere    LAB/DIAGNOSTIC STUDIES TODAY:      Component      Latest Ref Rng & Units 2/2/2023   WBC      4.0 - 11.0 10e3/uL 6.1   RBC Count      4.40 - 5.90 10e6/uL 4.34 (L)   Hemoglobin      13.3 - 17.7 g/dL 12.1 (L)   Hematocrit      40.0 - 53.0 % 37.4 (L)   MCV      78 - 100 fL 86   MCH      26.5 - 33.0 pg 27.9   MCHC      31.5 - 36.5 g/dL 32.4   RDW      10.0 - 15.0 % 14.9   Platelet Count      150 - 450 10e3/uL 180   % Neutrophils      % 70   % Lymphocytes      % 21   % Monocytes      % 8   % Eosinophils      %  1   % Basophils      % 0   % Immature Granulocytes      % 0   NRBCs per 100 WBC      <1 /100 0   Absolute Neutrophils      1.6 - 8.3 10e3/uL 4.3   Absolute Lymphocytes      0.8 - 5.3 10e3/uL 1.3   Absolute Monocytes      0.0 - 1.3 10e3/uL 0.5   Absolute Eosinophils      0.0 - 0.7 10e3/uL 0.0   Absolute Basophils      0.0 - 0.2 10e3/uL 0.0   Absolute Immature Granulocytes      <=0.4 10e3/uL 0.0   Absolute NRBCs      10e3/uL 0.0   Sodium      136 - 145 mmol/L 137   Potassium      3.4 - 5.3 mmol/L 4.5   Chloride      98 - 107 mmol/L 102   Carbon Dioxide (CO2)      22 - 29 mmol/L 27   Anion Gap      7 - 15 mmol/L 8   Urea Nitrogen      8.0 - 23.0 mg/dL 27.6 (H)   Creatinine      0.67 - 1.17 mg/dL 2.26 (H)   Calcium      8.8 - 10.2 mg/dL 9.1   Glucose      70 - 99 mg/dL 134 (H)   Alkaline Phosphatase      40 - 129 U/L 128   AST      10 - 50 U/L 23   ALT      10 - 50 U/L 19   Protein Total      6.4 - 8.3 g/dL 6.7   Albumin      3.5 - 5.2 g/dL 4.0   Bilirubin Total      <=1.2 mg/dL 0.3   GFR Estimate      >60 mL/min/1.73m2 31 (L)     Assessment      Lucian Oliveira is a 67 year old male seen as a PAC referral for risk assessment and optimization for anesthesia.    Plan/Recommendations  Pt will be optimized for the proposed procedure.  See below for details on the assessment, risk, and preoperative recommendations    NEUROLOGY  - No history of TIA, CVA or seizure  -Post Op delirium risk factors:  No risk identified    ENT  - No current airway concerns.  Will need to be reassessed day of surgery.  Mallampati: III  TM: > 3    CARDIAC  - CAD  Hx of CAD s/p CABG, now s/p heart transplant (7/2020)  Continue sirolimus and tacrolimus on DOS  - Hypertension  Well controlled   Continue lisinopril on DOS  - Hyperlipidemia  Continue rosuvastatin on DOS    Patient sees his transplant cardiologist regularly. Pt is next scheduled on 2/14/23 for routine follow-up with cardiologist and also for echo. Excellent current functional  "status (see ROS).    - METS (Metabolic Equivalents) > 4    PULMONARY  -Pt with hx of CHANTEL on CPAP. Pt reports he has not used CPAP during the last month as he has not needed to. His wife denies that the patient has loud snoring or stops breathing at night. Instructed pt to bring CPAP with on DOS.     - Denies asthma or inhaler use  - Tobacco History      History   Smoking Status     Never   Smokeless Tobacco     Never     Comment: Never smoked; non-smoking household       GI  PONV Low Risk  Total Score: 1           1 AN PONV: Patient is not a current smoker        /RENAL  -CKD, stable  - Baseline Creatinine  1.76  Cr today is 2.26, transplant team following  -BPH  Continue tamsulosin on DOS. Pt denies any symptoms of urinary retention. Monitor closely postop.     ENDOCRINE    - BMI: Estimated body mass index is 30.04 kg/m  as calculated from the following:    Height as of this encounter: 1.671 m (5' 5.8\").    Weight as of this encounter: 83.9 kg (185 lb).  Obesity (BMI >30)  - Diabetes  Diabetes Type II, insulin dependent. Hold short acting insulin DOS. Take 80% of last scheduled dose of long-acting insulin prior to procedure.  Recommend close monitoring of the patient's blood glucose levels throughout the perioperative period.  -Last A1C (9/12/22) was 8.4, rechecking today.    HEME   -Noted chronic anemia, improved today. Stable. Transplant team following.    VTE Medium Risk 1.8%            Total Score: 6    VTE: Greater than 59 yrs old    VTE: Male    VTE: Pt history of VTE      - Platelet disfunction second to Aspirin (Jose G, many others)  Continue Aspirin on DOS    MSK  -Chronic low back pain, consider careful position in OR and recovery.     OPHTHALMOLOGY  -proliferative diabetic retinopathy  Pt with decreased vision bilaterally  -Right eye cataract - surgery as above          Different anesthesia methods/types have been discussed with the patient, but they are aware that the final plan will be decided by the " assigned anesthesia provider on the date of service.  Patient was discussed with Dr. Bautista    The patient is optimized for their procedure. AVS with information on surgery time/arrival time, meds and NPO status given by nursing staff. No further diagnostic testing indicated.      On the day of service:     Prep time: 20 minutes  Visit time: 29 minutes  Documentation time: 10 minutes  ------------------------------------------  Total time: 59 minutes      ELIJAH Girard CNP (supervised by ELIJAH Hodges, CNP)  Preoperative Assessment Center  [facility]  Phone: [phone]  Fax: [phone]

## 2023-02-03 ENCOUNTER — APPOINTMENT (OUTPATIENT)
Dept: INTERPRETER SERVICES | Facility: CLINIC | Age: 68
End: 2023-02-03
Payer: COMMERCIAL

## 2023-02-03 ENCOUNTER — TELEPHONE (OUTPATIENT)
Dept: ENDOCRINOLOGY | Facility: CLINIC | Age: 68
End: 2023-02-03

## 2023-02-03 LAB
EBV DNA # SPEC NAA+PROBE: <500 COPIES/ML
EBV DNA SPEC NAA+PROBE-LOG#: <2.7 {LOG_COPIES}/ML

## 2023-02-03 ASSESSMENT — ENCOUNTER SYMPTOMS
SEIZURES: 0
ORTHOPNEA: 0

## 2023-02-03 ASSESSMENT — LIFESTYLE VARIABLES: TOBACCO_USE: 0

## 2023-02-03 ASSESSMENT — COPD QUESTIONNAIRES: COPD: 0

## 2023-02-03 NOTE — TELEPHONE ENCOUNTER
Spoke with pt w/  on the phone 2/3/2023 and scheduled a f/up with Marisabel Prieto on 4/25/2023 per the checkout orders from visit on 1/17/2023.

## 2023-02-03 NOTE — ANESTHESIA PREPROCEDURE EVALUATION
Anesthesia Pre-Procedure Evaluation    Patient: Lucian Oliveira   MRN: [id number] : 1955        Procedure : Procedure(s):  RIGHT EYE PHACOEMULSIFICATION, CATARACT, WITH INTRAOCULAR LENS IMPLANT with trypan / possible malyugin          Past Medical History:   Diagnosis Date     CAD (coronary artery disease)      CHF (congestive heart failure) (H)      CKD (chronic kidney disease), stage III (H)      Cortical cataract of both eyes      Diabetes (H)      Hyperlipidemia      Hypertension      Infection due to Streptococcus mitis group 2020     Ischemic cardiomyopathy      Obesity      CHANTEL (obstructive sleep apnea)     occas cpap     CHANTEL (obstructive sleep apnea)- severe (AHI 30)      Osteoarthritis       Past Surgical History:   Procedure Laterality Date     CATARACT IOL, RT/LT       COLONOSCOPY N/A 2019    Procedure: COLONOSCOPY, WITH POLYPECTOMY AND BIOPSY;  Surgeon: Chauncey Morataya MD;  Location:  GI     CV ANGIOGRAM CORONARY GRAFT N/A 2020    Procedure: Angiogram Coronary Graft;  Surgeon: Alex Lantigua MD;  Location:  HEART CARDIAC CATH LAB     CV CORONARY ANGIOGRAM N/A 2020    Procedure: CV CORONARY ANGIOGRAM;  Surgeon: Alex Lantigua MD;  Location: [facility] CARDIAC CATH LAB     CV CORONARY ANGIOGRAM N/A 2021    Procedure: CV CORONARY ANGIOGRAM;  Surgeon: Raimundo Hudson MD;  Location: [facility] CARDIAC CATH LAB     CV CORONARY ANGIOGRAM N/A 2022    Procedure: Coronary Angiogram- BIPLANE;  Surgeon: Raimundo Hudson MD;  Location:  HEART CARDIAC CATH LAB     CV HEART BIOPSY N/A 2020    Procedure: Heart Biopsy;  Surgeon: Mario Burr MD;  Location:  HEART CARDIAC CATH LAB     CV HEART BIOPSY N/A 8/3/2020    Procedure: CV HEART BIOPSY;  Surgeon: Alex Lantigua MD;  Location:  HEART CARDIAC CATH LAB     CV HEART BIOPSY N/A 8/10/2020    Procedure: CV HEART BIOPSY;  Surgeon: Mario Burr MD;   Location:  HEART CARDIAC CATH LAB     CV HEART BIOPSY N/A 8/17/2020    Procedure: CV HEART BIOPSY;  Surgeon: Raimundo Hudson MD;  Location:  HEART CARDIAC CATH LAB     CV HEART BIOPSY N/A 8/31/2020    Procedure: CV HEART BIOPSY;  Surgeon: Moises Santos MD;  Location:  HEART CARDIAC CATH LAB     CV HEART BIOPSY N/A 9/14/2020    Procedure: CV HEART BIOPSY;  Surgeon: Raimundo Hudson MD;  Location:  HEART CARDIAC CATH LAB     CV HEART BIOPSY N/A 9/28/2020    Procedure: CV HEART BIOPSY;  Surgeon: Raimundo Hudson MD;  Location:  HEART CARDIAC CATH LAB     CV HEART BIOPSY N/A 10/12/2020    Procedure: CV HEART BIOPSY;  Surgeon: John Stuart MD;  Location:  HEART CARDIAC CATH LAB     CV HEART BIOPSY N/A 11/10/2020    Procedure: CV HEART BIOPSY;  Surgeon: John Stuart MD;  Location:  HEART CARDIAC CATH LAB     CV HEART BIOPSY N/A 12/7/2020    Procedure: CV HEART BIOPSY;  Surgeon: Raimundo Hudson MD;  Location:  HEART CARDIAC CATH LAB     CV HEART BIOPSY N/A 1/5/2021    Procedure: CV HEART BIOPSY;  Surgeon: Raimundo Hudson MD;  Location:  HEART CARDIAC CATH LAB     CV HEART BIOPSY N/A 7/20/2021    Procedure: CV HEART BIOPSY;  Surgeon: Raimundo Hudson MD;  Location:  HEART CARDIAC CATH LAB     CV HEART BIOPSY N/A 9/28/2021    Procedure: CV HEART BIOPSY;  Surgeon: Raimundo Hudson MD;  Location:  HEART CARDIAC CATH LAB     CV HEART BIOPSY N/A 9/12/2022    Procedure: Heart Biopsy;  Surgeon: Raimundo Hudson MD;  Location:  HEART CARDIAC CATH LAB     CV INTRA AORTIC BALLOON N/A 7/14/2020    Procedure: RHC WITH LEAVE IN SWAN/IABP;  Surgeon: Sonny Saleh MD;  Location:  HEART CARDIAC CATH LAB     CV INTRAVASULAR ULTRASOUND N/A 9/12/2022    Procedure: Intravascular Ultrasound;  Surgeon: Raimundo Hudson MD;  [facility]     CV RIGHT HEART  CATH MEASUREMENTS RECORDED N/A 3/25/2019    Procedure: CV RIGHT HEART CATH;  Surgeon: Moises Santos MD;  [facility]: [facility] CARD[facility]AC CATH LAB     CV RIGHT HEART CATH MEASUREMENTS RECORDED N/A 7/10/2019    Procedure: CV RIGHT HEART CATH;  Surgeon: Jak Mccabe MD;  [facility] CARD[facility]AC [facility]ATH LAB     CV RIGHT HEART CATH MEASUREMENTS RECORDED N/A 7/8/2020    Procedure: Right Heart Cath with Leave In [place] already has ICU load;  Surgeon: Jak Mccabe MD;  [facility] CARD[facility]AC [facility]ATH LAB     CV RIGHT HEART CATH MEASUREMENTS RECORDED N/A 7/14/2020    Procedure: CV RIGHT HEART CATH;  Surgeon: Sonny Saleh MD;  [facility]ARD[facility] LAB     CV RIGHT HEART CATH MEASUREMENTS RECORDED N/A 7/2/2020    Procedure: CV RIGHT HEART CATH;  Surgeon: Alex Lantigua MD;  [facility]     CV RIGHT HEART CATH MEASUREMENTS RECORDED N/A 7/27/2020    Procedure: Right Heart Cath;  Surgeon: Mario Burr MD;  [facility]     CV RIGHT HEART CATH MEASUREMENTS RECORDED N/A 8/3/2020    Procedure: Right Heart Cath;  Surgeon: Alex Lantigua MD;  [facility]     CV RIGHT HEART CATH MEASUREMENTS RECORDED N/A 8/10/2020    Procedure: CV RIGHT HEART CATH;  Surgeon: Mario Burr MD;  [facility]     CV RIGHT HEART CATH MEASUREMENTS RECORDED N/A 8/17/2020    Procedure: CV RIGHT HEART CATH;  Surgeon: Raimundo Hudson MD;  [facility]     CV RIGHT HEART CATH MEASUREMENTS RECORDED N/A 8/31/2020    Procedure: CV RIGHT HEART CATH;  Surgeon: Moises Santos MD;  [facility]     CV RIGHT HEART CATH MEASUREMENTS RECORDED N/A 9/14/2020    Procedure: CV RIGHT HEART CATH;  Surgeon: Raimundo Hudson MD;  [facility]     CV RIGHT HEART CATH MEASUREMENTS RECORDED N/A 9/28/2020    Procedure: CV RIGHT HEART CATH;   Surgeon: Raimundo Hudson MD;  Location:  HEART CARDIAC CATH LAB     CV RIGHT HEART CATH MEASUREMENTS RECORDED N/A 10/12/2020    Procedure: CV RIGHT HEART CATH;  Surgeon: John Stuart MD;  Location:  HEART CARDIAC CATH LAB     CV RIGHT HEART CATH MEASUREMENTS RECORDED N/A 11/10/2020    Procedure: CV RIGHT HEART CATH;  Surgeon: John Stuart MD;  Location:  HEART CARDIAC CATH LAB     CV RIGHT HEART CATH MEASUREMENTS RECORDED N/A 12/7/2020    Procedure: CV RIGHT HEART CATH;  Surgeon: Raimundo Hudson MD;  Location:  HEART CARDIAC CATH LAB     CV RIGHT HEART CATH MEASUREMENTS RECORDED N/A 1/5/2021    Procedure: CV RIGHT HEART CATH;  Surgeon: Raimundo Hudson MD;  Location:  HEART CARDIAC CATH LAB     CV RIGHT HEART CATH MEASUREMENTS RECORDED N/A 7/20/2021    Procedure: CV RIGHT HEART CATH;  Surgeon: Raimundo Hudson MD;  Location:  HEART CARDIAC CATH LAB     CV RIGHT HEART CATH MEASUREMENTS RECORDED N/A 9/28/2021    Procedure: CV RIGHT HEART CATH;  Surgeon: Raimundo Hudson MD;  Location:  HEART CARDIAC CATH LAB     CV RIGHT HEART CATH MEASUREMENTS RECORDED N/A 9/12/2022    Procedure: Right Heart Catheterization;  Surgeon: Raimundo Hudson MD;  Location:  HEART CARDIAC CATH LAB     EXTRACTION(S) DENTAL Left 7/13/2020    Procedure: EXTRACTION, TOOTH #11, 12, 13, 15, and 29;  Surgeon: Monica Chao DDS;  [facility]     IMPLANT AUTOMATIC IMPLANTABLE CARDIOVERTER DEFIBRILLATOR       INCISION AND DRAINAGE STERNUM W/ IRRIGATION SYSTEM, COMBINED N/A 9/23/2020    Procedure: INCISION AND DRAINAGE, LEFT SUPRACLAVICULAR WOUND INFECTION AND ABDOMENN WOUND.;  Surgeon: Ran Huertas MD;  [facility]     INSERT INTRAAORTIC BALLOON PUMP N/A 7/16/2020    Procedure: Subclavian Intra-Aortic Balloon Pump Placement;  Surgeon: Allan Sparrow MD;  [facility]     IRRIGATION AND DEBRIDEMENT CHEST WASHOUT, COMBINED N/A  9/28/2020    Procedure: IRRIGATION AND DEBRIDEMENT OF LEFT UPPER CHEST WOUND.;  Surgeon: Ran Huertas MD;  [facility]     PHACOEMULSIFICATION CLEAR CORNEA WITH STANDARD IOL, VITRECTOMY PARSPLANA 25 GAGUE, COMBINED Left 12/10/2019    Procedure: PHACOEMULSIFICATION, CATARACT, CLEAR CORNEAL INCISION APPROACH, W STD INTRAOCULAR LENS IMPLANT INSERT + VITRECTOMY BY PARS PLANA  USING 25-GAUGE INSTRUMENTS. ENDOLASER, INFUSION OF 20% SF6 GAS;  Surgeon: Triny Bunch MD;  [facility] OR     PICC DOUBLE LUMEN PLACEMENT Left 07/28/2020    5Fr - 42cm, Basilic vein, mid SVC     PICC INSERTION Right 07/11/2020    basilic 44 cm total      TRANSPLANT HEART RECIPIENT N/A 7/19/2020    Procedure: REDO MEDIAN STERNOTOMY, TRANSPLANT, ORTHOTOPIC HEART, RECIPIENT, ON PUMP OXYGENATOR, REMOVAL OF CARDIAC DEFIBRILLATOR AND LEAD;  Surgeon: Griselli, Massimo, MD;  [facility]     VITRECTOMY, PARS PLANA APPROACH, USING 27-GAUGE INSTRUMENTS Left 12/21/2020    Procedure: 27 gauge pars plana vitectomy, membrane peel, perfluoroctane liquid (PFO), retinectomy, endolaser, Silicone Oil 1000 cs;  Surgeon: Triny Bunch MD;  Location:  OR     [facility] CABG, ARTERY-VEIN, THREE  02/2008      Allergies   Allergen Reactions     Heparin Heparin Induced Thrombocytopenia     HIT screen sent 7/18/2020. CORRINE confirmed positive      Social History     Tobacco Use     Smoking status: Never     Smokeless tobacco: Never     Tobacco comments:     Never smoked; non-smoking household   Substance Use Topics     Alcohol use: No     Alcohol/week: 0.0 standard drinks      Wt Readings from Last 1 Encounters:   02/02/23 83.9 kg (185 lb)        Anesthesia Evaluation   Pt has had prior anesthetic. Type: General and MAC.    No history of anesthetic complications       ROS/MED HX  ENT/Pulmonary: Comment: Has not used CPAP in about a month. Pt and wife deny current CHANTEL sxs off CPAP.    (+) sleep apnea, uses CPAP, CHANTEL risk factors, hypertension,  (-)  tobacco use, asthma, COPD and recent URI   Neurologic:  - neg neurologic ROS  (-) no seizures and no CVA   Cardiovascular: Comment: Hx of CAD s/p CABG 2008, now s/p heart transplant (7/2020)    (+) Dyslipidemia hypertension--CAD -CABG-date: 3v CABG 2008. -Taking blood thinners CHF Previous cardiac testing   Echo: Date: 3/14/22 Results:  See H&P  Stress Test:  Date: 9/26/22 Results:  See H&P  ECG Reviewed:  Date: 9/26/22 Results:  See H&P  Cath:  Date: 5/12/22 Results:  See H&P (-) ALBRIGHT, orthopnea/PND and irregular heartbeat/palpitations   METS/Exercise Tolerance:  Comment: Walking on treadmill 30 mins daily flat and with incline without difficulty.   Cycles 20 mins 5-6 x week    Hematologic: Comments: Hx of acute embolism and thrombosis of right axillary vein around time of heart transplant   Hx of HIT  Denies any blood clots since this time. Not on any blood thinners currently.     (+) History of blood clots, anemia, history of blood transfusion, no previous transfusion reaction, - with heart transplant,     Musculoskeletal: Comment: Chronic back pain - takes occ. tylenol      GI/Hepatic:  - neg GI/hepatic ROS  (-) GERD, inflamatory bowel disease and liver disease   Renal/Genitourinary:     (+) renal disease, type: CRI, BPH,     Endo:     (+) type II DM, Using insulin, - not using insulin pump. Normal glucose range: averages 110 in AM, 170-180 in evenings, not previously admitted for DM/DKA. Diabetic complications: nephropathy retinopathy. Obesity,  (-) thyroid disease and chronic steroid usage   Psychiatric/Substance Use:  - neg psychiatric ROS     Infectious Disease:  - neg infectious disease ROS  (-) Recent Fever   Malignancy:  - neg malignancy ROS     Other:      (+) , H/O Chronic Pain,           OUTSIDE LABS:  CBC:   Lab Results   Component Value Date    WBC 6.1 02/02/2023    WBC 4.9 10/14/2022    HGB 12.1 (L) 02/02/2023    HGB 11.6 (L) 10/14/2022    HCT 37.4 (L) 02/02/2023    HCT 35.5 (L) 10/14/2022      02/02/2023     10/14/2022     BMP:   Lab Results   Component Value Date     02/02/2023     10/14/2022    POTASSIUM 4.5 02/02/2023    POTASSIUM 4.6 10/14/2022    CHLORIDE 102 02/02/2023    CHLORIDE 101 10/14/2022    CO2 27 02/02/2023    CO2 26 10/14/2022    BUN 27.6 (H) 02/02/2023    BUN 21.4 10/14/2022    CR 2.26 (H) 02/02/2023    CR 1.76 (H) 10/14/2022     (H) 02/02/2023     (H) 10/14/2022     COAGS:   Lab Results   Component Value Date    PTT 31 09/22/2020    INR 1.02 11/10/2020    FIBR 295 07/20/2020     POC:   Lab Results   Component Value Date     (H) 12/21/2020     HEPATIC:   Lab Results   Component Value Date    ALBUMIN 4.0 02/02/2023    PROTTOTAL 6.7 02/02/2023    ALT 19 02/02/2023    AST 23 02/02/2023    ALKPHOS 128 02/02/2023    BILITOTAL 0.3 02/02/2023     OTHER:   Lab Results   Component Value Date    PH 7.47 (H) 07/23/2020    LACT 1.3 09/23/2020    A1C 8.3 (H) 02/02/2023    BRYANNA 9.1 02/02/2023    PHOS 3.0 02/02/2023    MAG 1.8 02/02/2023    AMYLASE 36 07/19/2020    TSH 0.80 08/05/2020    CRP 16.0 (H) 09/25/2020       Anesthesia Plan    ASA Status:  3      Anesthesia Type: MAC.     - Reason for MAC: straight local not clinically adequate   Induction: Intravenous, Propofol.   Maintenance: TIVA.        Consents         - Extended Intubation/Ventilatory Support Discussed: No.      - Patient is DNR/DNI Status: No    Use of blood products discussed: No .     Postoperative Care    Pain management: IV analgesics, Oral pain medications, Multi-modal analgesia.   PONV prophylaxis: Dexamethasone or Solumedrol, Ondansetron (or other 5HT-3)     Comments:                Rolf Burton MD

## 2023-02-06 ENCOUNTER — ANESTHESIA (OUTPATIENT)
Dept: SURGERY | Facility: CLINIC | Age: 68
End: 2023-02-06
Payer: COMMERCIAL

## 2023-02-06 ENCOUNTER — APPOINTMENT (OUTPATIENT)
Dept: INTERPRETER SERVICES | Facility: CLINIC | Age: 68
End: 2023-02-06
Attending: OPHTHALMOLOGY
Payer: COMMERCIAL

## 2023-02-06 ENCOUNTER — HOSPITAL ENCOUNTER (OUTPATIENT)
Facility: CLINIC | Age: 68
Discharge: HOME | End: 2023-02-06
Attending: OPHTHALMOLOGY | Admitting: OPHTHALMOLOGY
Payer: COMMERCIAL

## 2023-02-06 VITALS
TEMPERATURE: 97.5 F | HEART RATE: 86 BPM | DIASTOLIC BLOOD PRESSURE: 74 MMHG | SYSTOLIC BLOOD PRESSURE: 123 MMHG | RESPIRATION RATE: 16 BRPM | WEIGHT: 179.68 LBS | HEIGHT: 65 IN | OXYGEN SATURATION: 96 % | BODY MASS INDEX: 29.94 KG/M2

## 2023-02-06 DIAGNOSIS — Z48.810 AFTERCARE FOLLOWING SURGERY OF A SENSE ORGAN: Primary | ICD-10-CM

## 2023-02-06 LAB
GLUCOSE BLDC GLUCOMTR-MCNC: 136 MG/DL (ref 70–99)
GLUCOSE BLDC GLUCOMTR-MCNC: 211 MG/DL (ref 70–99)
IMMUKNOW IMMUNE CELL FUNCTION: 357 NG/ML

## 2023-02-06 PROCEDURE — 250N000009 HC RX 250: Performed by: NURSE ANESTHETIST, CERTIFIED REGISTERED

## 2023-02-06 PROCEDURE — 272N000001 HC OR GENERAL SUPPLY STERILE: Performed by: OPHTHALMOLOGY

## 2023-02-06 PROCEDURE — 360N000076 HC SURGERY LEVEL 3, PER MIN: Performed by: OPHTHALMOLOGY

## 2023-02-06 PROCEDURE — 82962 GLUCOSE BLOOD TEST: CPT

## 2023-02-06 PROCEDURE — 250N000011 HC RX IP 250 OP 636: Performed by: STUDENT IN AN ORGANIZED HEALTH CARE EDUCATION/TRAINING PROGRAM

## 2023-02-06 PROCEDURE — 250N000011 HC RX IP 250 OP 636: Performed by: NURSE ANESTHETIST, CERTIFIED REGISTERED

## 2023-02-06 PROCEDURE — 250N000009 HC RX 250: Performed by: OPHTHALMOLOGY

## 2023-02-06 PROCEDURE — 370N000017 HC ANESTHESIA TECHNICAL FEE, PER MIN: Performed by: OPHTHALMOLOGY

## 2023-02-06 PROCEDURE — 250N000011 HC RX IP 250 OP 636: Performed by: OPHTHALMOLOGY

## 2023-02-06 PROCEDURE — 250N000013 HC RX MED GY IP 250 OP 250 PS 637: Performed by: STUDENT IN AN ORGANIZED HEALTH CARE EDUCATION/TRAINING PROGRAM

## 2023-02-06 PROCEDURE — V2632 POST CHMBR INTRAOCULAR LENS: HCPCS | Performed by: OPHTHALMOLOGY

## 2023-02-06 PROCEDURE — 258N000003 HC RX IP 258 OP 636: Performed by: STUDENT IN AN ORGANIZED HEALTH CARE EDUCATION/TRAINING PROGRAM

## 2023-02-06 PROCEDURE — 250N000009 HC RX 250: Performed by: STUDENT IN AN ORGANIZED HEALTH CARE EDUCATION/TRAINING PROGRAM

## 2023-02-06 PROCEDURE — 66984 XCAPSL CTRC RMVL W/O ECP: CPT | Mod: RT | Performed by: OPHTHALMOLOGY

## 2023-02-06 PROCEDURE — 999N000141 HC STATISTIC PRE-PROCEDURE NURSING ASSESSMENT: Performed by: OPHTHALMOLOGY

## 2023-02-06 PROCEDURE — 710N000012 HC RECOVERY PHASE 2, PER MINUTE: Performed by: OPHTHALMOLOGY

## 2023-02-06 DEVICE — EYE IMP IOL ALCON PCL SN60WF ACRYSOF IQ 20.5: Type: IMPLANTABLE DEVICE | Site: EYE | Status: FUNCTIONAL

## 2023-02-06 RX ORDER — KETOROLAC TROMETHAMINE 30 MG/ML
15 INJECTION, SOLUTION INTRAMUSCULAR; INTRAVENOUS
Status: DISCONTINUED | OUTPATIENT
Start: 2023-02-06 | End: 2023-02-06 | Stop reason: HOSPADM

## 2023-02-06 RX ORDER — FENTANYL CITRATE 50 UG/ML
25 INJECTION, SOLUTION INTRAMUSCULAR; INTRAVENOUS EVERY 5 MIN PRN
Status: DISCONTINUED | OUTPATIENT
Start: 2023-02-06 | End: 2023-02-06 | Stop reason: HOSPADM

## 2023-02-06 RX ORDER — DIMENHYDRINATE 50 MG/ML
25 INJECTION, SOLUTION INTRAMUSCULAR; INTRAVENOUS
Status: DISCONTINUED | OUTPATIENT
Start: 2023-02-06 | End: 2023-02-06 | Stop reason: HOSPADM

## 2023-02-06 RX ORDER — ALBUTEROL SULFATE 0.83 MG/ML
2.5 SOLUTION RESPIRATORY (INHALATION) EVERY 4 HOURS PRN
Status: DISCONTINUED | OUTPATIENT
Start: 2023-02-06 | End: 2023-02-06 | Stop reason: HOSPADM

## 2023-02-06 RX ORDER — ONDANSETRON 4 MG/1
4 TABLET, ORALLY DISINTEGRATING ORAL EVERY 30 MIN PRN
Status: DISCONTINUED | OUTPATIENT
Start: 2023-02-06 | End: 2023-02-06 | Stop reason: HOSPADM

## 2023-02-06 RX ORDER — PREDNISOLONE ACETATE 10 MG/ML
1 SUSPENSION/ DROPS OPHTHALMIC 4 TIMES DAILY
Qty: 5 ML | Refills: 1 | Status: SHIPPED | OUTPATIENT
Start: 2023-02-06 | End: 2023-03-09

## 2023-02-06 RX ORDER — LORAZEPAM 2 MG/ML
.5-1 INJECTION INTRAMUSCULAR
Status: DISCONTINUED | OUTPATIENT
Start: 2023-02-06 | End: 2023-02-06 | Stop reason: HOSPADM

## 2023-02-06 RX ORDER — TETRACAINE HYDROCHLORIDE 5 MG/ML
SOLUTION OPHTHALMIC PRN
Status: DISCONTINUED | OUTPATIENT
Start: 2023-02-06 | End: 2023-02-06 | Stop reason: HOSPADM

## 2023-02-06 RX ORDER — OXYCODONE HYDROCHLORIDE 5 MG/1
5 TABLET ORAL EVERY 4 HOURS PRN
Status: DISCONTINUED | OUTPATIENT
Start: 2023-02-06 | End: 2023-02-06 | Stop reason: HOSPADM

## 2023-02-06 RX ORDER — HALOPERIDOL 5 MG/ML
1 INJECTION INTRAMUSCULAR
Status: DISCONTINUED | OUTPATIENT
Start: 2023-02-06 | End: 2023-02-06 | Stop reason: HOSPADM

## 2023-02-06 RX ORDER — PROPARACAINE HYDROCHLORIDE 5 MG/ML
1 SOLUTION/ DROPS OPHTHALMIC ONCE
Status: COMPLETED | OUTPATIENT
Start: 2023-02-06 | End: 2023-02-06

## 2023-02-06 RX ORDER — OXYCODONE HYDROCHLORIDE 5 MG/1
10 TABLET ORAL EVERY 4 HOURS PRN
Status: DISCONTINUED | OUTPATIENT
Start: 2023-02-06 | End: 2023-02-06 | Stop reason: HOSPADM

## 2023-02-06 RX ORDER — ACETAMINOPHEN 325 MG/1
975 TABLET ORAL ONCE
Status: DISCONTINUED | OUTPATIENT
Start: 2023-02-06 | End: 2023-02-06 | Stop reason: HOSPADM

## 2023-02-06 RX ORDER — HYDROMORPHONE HYDROCHLORIDE 1 MG/ML
0.4 INJECTION, SOLUTION INTRAMUSCULAR; INTRAVENOUS; SUBCUTANEOUS EVERY 5 MIN PRN
Status: DISCONTINUED | OUTPATIENT
Start: 2023-02-06 | End: 2023-02-06 | Stop reason: HOSPADM

## 2023-02-06 RX ORDER — HYDRALAZINE HYDROCHLORIDE 20 MG/ML
2.5-5 INJECTION INTRAMUSCULAR; INTRAVENOUS EVERY 10 MIN PRN
Status: DISCONTINUED | OUTPATIENT
Start: 2023-02-06 | End: 2023-02-06 | Stop reason: HOSPADM

## 2023-02-06 RX ORDER — ONDANSETRON 2 MG/ML
4 INJECTION INTRAMUSCULAR; INTRAVENOUS EVERY 30 MIN PRN
Status: DISCONTINUED | OUTPATIENT
Start: 2023-02-06 | End: 2023-02-06 | Stop reason: HOSPADM

## 2023-02-06 RX ORDER — LABETALOL HYDROCHLORIDE 5 MG/ML
10 INJECTION, SOLUTION INTRAVENOUS
Status: DISCONTINUED | OUTPATIENT
Start: 2023-02-06 | End: 2023-02-06 | Stop reason: HOSPADM

## 2023-02-06 RX ORDER — LIDOCAINE 40 MG/G
CREAM TOPICAL
Status: DISCONTINUED | OUTPATIENT
Start: 2023-02-06 | End: 2023-02-06 | Stop reason: HOSPADM

## 2023-02-06 RX ORDER — ACETAMINOPHEN 325 MG/1
975 TABLET ORAL ONCE
Status: COMPLETED | OUTPATIENT
Start: 2023-02-06 | End: 2023-02-06

## 2023-02-06 RX ORDER — MEPERIDINE HYDROCHLORIDE 25 MG/ML
12.5 INJECTION INTRAMUSCULAR; INTRAVENOUS; SUBCUTANEOUS EVERY 5 MIN PRN
Status: DISCONTINUED | OUTPATIENT
Start: 2023-02-06 | End: 2023-02-06 | Stop reason: HOSPADM

## 2023-02-06 RX ORDER — ONDANSETRON 2 MG/ML
INJECTION INTRAMUSCULAR; INTRAVENOUS PRN
Status: DISCONTINUED | OUTPATIENT
Start: 2023-02-06 | End: 2023-02-06

## 2023-02-06 RX ORDER — OFLOXACIN 3 MG/ML
1 SOLUTION/ DROPS OPHTHALMIC 4 TIMES DAILY
Qty: 5 ML | Refills: 0 | Status: SHIPPED | OUTPATIENT
Start: 2023-02-06 | End: 2023-10-12

## 2023-02-06 RX ORDER — HYDROMORPHONE HYDROCHLORIDE 1 MG/ML
0.2 INJECTION, SOLUTION INTRAMUSCULAR; INTRAVENOUS; SUBCUTANEOUS EVERY 5 MIN PRN
Status: DISCONTINUED | OUTPATIENT
Start: 2023-02-06 | End: 2023-02-06 | Stop reason: HOSPADM

## 2023-02-06 RX ORDER — KETOROLAC TROMETHAMINE 5 MG/ML
1 SOLUTION OPHTHALMIC 4 TIMES DAILY
Qty: 5 ML | Refills: 0 | Status: SHIPPED | OUTPATIENT
Start: 2023-02-06 | End: 2023-03-09

## 2023-02-06 RX ORDER — SODIUM CHLORIDE, SODIUM LACTATE, POTASSIUM CHLORIDE, CALCIUM CHLORIDE 600; 310; 30; 20 MG/100ML; MG/100ML; MG/100ML; MG/100ML
INJECTION, SOLUTION INTRAVENOUS CONTINUOUS
Status: DISCONTINUED | OUTPATIENT
Start: 2023-02-06 | End: 2023-02-06 | Stop reason: HOSPADM

## 2023-02-06 RX ORDER — PROPOFOL 10 MG/ML
INJECTION, EMULSION INTRAVENOUS PRN
Status: DISCONTINUED | OUTPATIENT
Start: 2023-02-06 | End: 2023-02-06

## 2023-02-06 RX ORDER — FENTANYL CITRATE 50 UG/ML
INJECTION, SOLUTION INTRAMUSCULAR; INTRAVENOUS PRN
Status: DISCONTINUED | OUTPATIENT
Start: 2023-02-06 | End: 2023-02-06

## 2023-02-06 RX ORDER — LIDOCAINE HYDROCHLORIDE 20 MG/ML
INJECTION, SOLUTION INFILTRATION; PERINEURAL PRN
Status: DISCONTINUED | OUTPATIENT
Start: 2023-02-06 | End: 2023-02-06

## 2023-02-06 RX ORDER — DEXAMETHASONE SODIUM PHOSPHATE 4 MG/ML
4 INJECTION, SOLUTION INTRA-ARTICULAR; INTRALESIONAL; INTRAMUSCULAR; INTRAVENOUS; SOFT TISSUE
Status: DISCONTINUED | OUTPATIENT
Start: 2023-02-06 | End: 2023-02-06 | Stop reason: HOSPADM

## 2023-02-06 RX ORDER — BALANCED SALT SOLUTION 6.4; .75; .48; .3; 3.9; 1.7 MG/ML; MG/ML; MG/ML; MG/ML; MG/ML; MG/ML
SOLUTION OPHTHALMIC PRN
Status: DISCONTINUED | OUTPATIENT
Start: 2023-02-06 | End: 2023-02-06 | Stop reason: HOSPADM

## 2023-02-06 RX ORDER — FENTANYL CITRATE 50 UG/ML
25 INJECTION, SOLUTION INTRAMUSCULAR; INTRAVENOUS
Status: DISCONTINUED | OUTPATIENT
Start: 2023-02-06 | End: 2023-02-06 | Stop reason: HOSPADM

## 2023-02-06 RX ORDER — HYDROXYZINE HYDROCHLORIDE 10 MG/1
10 TABLET, FILM COATED ORAL EVERY 6 HOURS PRN
Status: DISCONTINUED | OUTPATIENT
Start: 2023-02-06 | End: 2023-02-06 | Stop reason: HOSPADM

## 2023-02-06 RX ORDER — FENTANYL CITRATE 50 UG/ML
50 INJECTION, SOLUTION INTRAMUSCULAR; INTRAVENOUS EVERY 5 MIN PRN
Status: DISCONTINUED | OUTPATIENT
Start: 2023-02-06 | End: 2023-02-06 | Stop reason: HOSPADM

## 2023-02-06 RX ORDER — CYCLOPENTOLAT/TROPIC/PHENYLEPH 1%-1%-2.5%
1 DROPS (EA) OPHTHALMIC (EYE)
Status: COMPLETED | OUTPATIENT
Start: 2023-02-06 | End: 2023-02-06

## 2023-02-06 RX ADMIN — Medication 1 DROP: at 10:05

## 2023-02-06 RX ADMIN — LIDOCAINE HYDROCHLORIDE 60 MG: 20 INJECTION, SOLUTION INFILTRATION; PERINEURAL at 12:30

## 2023-02-06 RX ADMIN — ONDANSETRON 4 MG: 2 INJECTION INTRAMUSCULAR; INTRAVENOUS at 13:40

## 2023-02-06 RX ADMIN — Medication 1 DROP: at 09:58

## 2023-02-06 RX ADMIN — Medication 1 DROP: at 10:10

## 2023-02-06 RX ADMIN — FENTANYL CITRATE 25 MCG: 50 INJECTION, SOLUTION INTRAMUSCULAR; INTRAVENOUS at 12:30

## 2023-02-06 RX ADMIN — PROPOFOL 30 MG: 10 INJECTION, EMULSION INTRAVENOUS at 12:33

## 2023-02-06 RX ADMIN — ACETAMINOPHEN 975 MG: 325 TABLET ORAL at 09:56

## 2023-02-06 RX ADMIN — MIDAZOLAM 0.5 MG: 1 INJECTION INTRAMUSCULAR; INTRAVENOUS at 12:30

## 2023-02-06 RX ADMIN — PROPOFOL 50 MG: 10 INJECTION, EMULSION INTRAVENOUS at 12:30

## 2023-02-06 RX ADMIN — PROPARACAINE HYDROCHLORIDE 1 DROP: 5 SOLUTION/ DROPS OPHTHALMIC at 09:55

## 2023-02-06 RX ADMIN — SODIUM CHLORIDE, POTASSIUM CHLORIDE, SODIUM LACTATE AND CALCIUM CHLORIDE: 600; 310; 30; 20 INJECTION, SOLUTION INTRAVENOUS at 12:16

## 2023-02-06 ASSESSMENT — ACTIVITIES OF DAILY LIVING (ADL)
ADLS_ACUITY_SCORE: 35

## 2023-02-06 NOTE — DISCHARGE INSTRUCTIONS
Dr. Triny Bunch  [facility]  [phone]  Post Operative Cataract Instructions    If you have a gauze eye patch on, please do not remove it until it is removed by your physician at your first post-operative visit.  You will start your eye drops the next day.    Wear the clear eye shield when sleeping for protection for 5 days.    Do not rub the operated eye.    Light sensitivity may be noticed. Sunglasses may be worn for comfort.    Some discomfort and irritation may be noticed. Acetaminophen (Tylenol) or Ibuprofen (Advil) may be taken for discomfort. If pain persists please call Dr. Bunch's office.    Keep the operated eye dry. You may wash your hair, bathe or shower, but keep the operated eye closed while doing so.     If you take glaucoma medications, bring them with you to the clinic on your first post operative visit.    Bring your prescribed eye drops with you to your scheduled post-operative appointment.    Use medication exactly as prescribed by your doctor. You may restart your regular home medications.     Call Dr. Bunch's office at [phone] if any of the following should occur:    Any sudden vision changes, including decreased vision  Nausea or severe headache  Increase in pain not controlled  Signs of infection (pus, increasing redness or tenderness)  Severe sensitivity to light

## 2023-02-06 NOTE — ANESTHESIA CARE TRANSFER NOTE
Patient: Lucian Oliveira    Procedure: Procedure(s):  RIGHT EYE PHACOEMULSIFICATION, CATARACT, WITH INTRAOCULAR LENS IMPLANT with trypan blue       Diagnosis: Nuclear senile cataract of right eye [H25.11]  Diagnosis Additional Information: No value filed.    Anesthesia Type:   MAC     Note:    Oropharynx: oropharynx clear of all foreign objects  Level of Consciousness: awake  Oxygen Supplementation: room air    Independent Airway: airway patency satisfactory and stable  Dentition: dentition unchanged  Vital Signs Stable: post-procedure vital signs reviewed and stable  Report to RN Given: handoff report given  Patient transferred to: PACU    Handoff Report: Identifed the Patient, Identified the Reponsible Provider, Reviewed the pertinent medical history, Discussed the surgical course, Reviewed Intra-OP anesthesia mangement and issues during anesthesia, Set expectations for post-procedure period and Allowed opportunity for questions and acknowledgement of understanding      Vitals:  Vitals Value Taken Time   /74 02/06/23 1348   Temp 36.4  C (97.5  F) 02/06/23 1348   Pulse 87 02/06/23 1348   Resp 16 02/06/23 1348   SpO2 96 % 02/06/23 1358   Vitals shown include unvalidated device data.    Electronically Signed By: ELIJAH Wagner CRNA  February 6, 2023  2:00 PM

## 2023-02-06 NOTE — ADDENDUM NOTE
Addendum  created 02/06/23 1429 by Rolf Burton MD    Attestation recorded in Intraprocedure, Intraprocedure Attestations filed

## 2023-02-06 NOTE — ANESTHESIA POSTPROCEDURE EVALUATION
Patient: Lucian Oliveira    Procedure: Procedure(s):  RIGHT EYE PHACOEMULSIFICATION, CATARACT, WITH INTRAOCULAR LENS IMPLANT with trypan blue       Anesthesia Type:  MAC    Note:  Disposition: Outpatient   Postop Pain Control: Uneventful            Sign Out: Well controlled pain   PONV:    Neuro/Psych: Uneventful            Sign Out: Acceptable/Baseline neuro status   Airway/Respiratory: Uneventful            Sign Out: Acceptable/Baseline resp. status   CV/Hemodynamics: Uneventful            Sign Out: Acceptable CV status; No obvious hypovolemia; No obvious fluid overload   Other NRE:    DID A NON-ROUTINE EVENT OCCUR?            Last vitals:  Vitals Value Taken Time   /74 02/06/23 1425   Temp 36.4  C (97.5  F) 02/06/23 1425   Pulse 86 02/06/23 1424   Resp 12 02/06/23 1415   SpO2 97 % 02/06/23 1426   Vitals shown include unvalidated device data.    Electronically Signed By: Rolf Burton MD  February 6, 2023  2:29 PM

## 2023-02-06 NOTE — OP NOTE
SURGEON:   JENNY MAURICIO MD  Assistant surgeon: Mathew Cordoba MD, MPH    PREOPERATIVE DIAGNOSIS:   1. visually significant cataract, right eye  POSTOPERATIVE DIAGNOSIS: same  NAME OF THE PROCEDURE: Phacoemulsification with intraocular lens implantation, right eye  ANESTHESIA: Monitored anesthesia care and peribulbar block   COMPLICATIONS: none  INDICATIONS: Lucian Oliveira is a 67 year old with diagnosis of visually significant cataract, here for cataract surgery    DESCRIPTION OF THE PROCEDURE:  The patient was taken to the operative room where intravenous sedation was administered and a modified peribulbar block with conjunctival cut-down consisting of a 1:1 mixture of 2%lidocaine and 0.75% marcaine with epinephrine and wydase, was administered to the operative eye with adequate anesthesia and akinesia.    The operative eye was prepped and draped in the usual sterile surgical fashion for ophthalmic surgery, including the installation of one drop of 5% Povidone Iodine.  A sterile drape was placed over the face and body and a lid speculum was inserted.      With the use of a Supersharp blade and 0.12 forceps, a paracentesis was created at the 2:30 position. Next, tripan blue was used stain the anterior capsule. The anterior chamber was irrigated. Next, viscoelastic was injected into the anterior chamber using a canula.  A 2.5 mm keratome was then used to construct a clear corneal incision at the 12 o'clock position.  Using Utrata forceps and cystotome needle, a continuous curvilinear capsulorrhexis was created and hydrodissection was undertaken with the use of BSS.  The nucleus was found to be freely mobile and then removed by phacoemulsification using a modified divide and conquer technique.  The remaining elements of cortex were then removed with irrigation/aspiration.  An IOL,was injected into the capsular bag and was rotated into a good position. The remaining elements of viscoelastic were then  removed with irrigation/aspiration.    The lid speculum was removed.  Subconjunctival injection of Dexamethasone and Ancef were administered.  The eye was cleaned with wet and dry gauze. Maxitrol ointment was placed on the eye.  A patch and Silveira shield were placed over the eye.     Implant Name Type Inv. Item Serial No.  Lot No. LRB No. Used Action   EYE IMP IOL OTILIA PCL [id number] ACRYSOF IQ 20.5 - [id number] Lens/Eye Implant EYE IMP IOL OTILIA PCL [id number] ACRYSOF IQ 20.5 [id number] [employer]  Right 1 Implanted     The surgery was assisted by Dr. Thang Cordoba. Due to the delicate and complex nature of this surgery, an assistant was required. He assisted with Cataract extraction. I was present for the entire surgery.

## 2023-02-06 NOTE — OR NURSING
Dr Burton notified of blood glucose of 211. Patient took 35 units of NPH this morning at 0730. Plan to proceed. No new orders.

## 2023-02-07 ENCOUNTER — TELEPHONE (OUTPATIENT)
Dept: SURGERY | Facility: CLINIC | Age: 68
End: 2023-02-07

## 2023-02-07 ENCOUNTER — TELEPHONE (OUTPATIENT)
Dept: TRANSPLANT | Facility: CLINIC | Age: 68
End: 2023-02-07

## 2023-02-07 ENCOUNTER — OFFICE VISIT (OUTPATIENT)
Dept: OPHTHALMOLOGY | Facility: CLINIC | Age: 68
End: 2023-02-07
Attending: STUDENT IN AN ORGANIZED HEALTH CARE EDUCATION/TRAINING PROGRAM
Payer: COMMERCIAL

## 2023-02-07 DIAGNOSIS — Z94.1 HEART REPLACED BY TRANSPLANT (H): Primary | ICD-10-CM

## 2023-02-07 DIAGNOSIS — Z94.1 HEART TRANSPLANT, ORTHOTOPIC, STATUS (H): Primary | ICD-10-CM

## 2023-02-07 DIAGNOSIS — Z48.810 AFTERCARE FOLLOWING SURGERY OF A SENSE ORGAN: Primary | ICD-10-CM

## 2023-02-07 PROCEDURE — G0463 HOSPITAL OUTPT CLINIC VISIT: HCPCS

## 2023-02-07 PROCEDURE — 99024 POSTOP FOLLOW-UP VISIT: CPT | Performed by: STUDENT IN AN ORGANIZED HEALTH CARE EDUCATION/TRAINING PROGRAM

## 2023-02-07 ASSESSMENT — EXTERNAL EXAM - LEFT EYE: OS_EXAM: NORMAL

## 2023-02-07 ASSESSMENT — VISUAL ACUITY
OD_SC: 20/80
OD_SC+: +1
OS_SC: CF @ 2 FT
METHOD: SNELLEN - LINEAR

## 2023-02-07 ASSESSMENT — EXTERNAL EXAM - RIGHT EYE: OD_EXAM: NORMAL

## 2023-02-07 ASSESSMENT — SLIT LAMP EXAM - LIDS: COMMENTS: NORMAL

## 2023-02-07 ASSESSMENT — TONOMETRY
OD_IOP_MMHG: 24
IOP_METHOD: ICARE
OS_IOP_MMHG: 17

## 2023-02-07 NOTE — RESULT ENCOUNTER NOTE
David Jones    This patient has MyChart, but doesn't speak English.  His surgery was yesterday and I forgot to send this to you last week.  Can you please call him with an  and just let him know that his CMP was stable.  Continue to follow labs with his transplant team.      Thank you!  Maribell Waldron DNP, RN, ANP-C

## 2023-02-07 NOTE — NURSING NOTE
Chief Complaints and History of Present Illnesses   Patient presents with     Post Op (Ophthalmology) Right Eye     POD#1 s/p CE/IOL RE 02/06/2023     Chief Complaint(s) and History of Present Illness(es)     Post Op (Ophthalmology) Right Eye            Comments: POD#1 s/p CE/IOL RE 02/06/2023          Comments    Pt here with  over the iPad today.  Pt did not sleep well last night. No eye pain, pt just could not sleep.  No eye pain today. No new flashes or floaters.    DM2 BS:192 this morning per pt.  Lab Results       Component                Value               Date                       A1C                      8.3                 02/02/2023                 A1C                      8.4                 09/12/2022                 A1C                      7.4                 03/14/2022                 A1C                      7.3                 11/17/2021                 A1C                      7.8                 10/15/2021                 A1C                      6.7                 01/14/2021                 A1C                      5.9                 12/07/2020                 A1C                      8.2                 07/03/2020                 A1C                      7.9                 07/08/2019                 A1C                      8.5                 03/11/2019              PABLITO Alberto February 7, 2023 9:23 AM

## 2023-02-07 NOTE — TELEPHONE ENCOUNTER
Using a  over the phone, updated Lucian that his CMP was stable and to continue to follow labs with his transplant team.  Lucian expressed understanding.  Gabrielle Bradford RN

## 2023-02-07 NOTE — PROGRESS NOTES
CC: POD1 s/p CEIOL OD     Interval: No significant pain last night.     HPI: 67 year old man PMHx of CAD (s/p 3v CABG 2008), ischemic CM, now s/p orthotopic heart transplant 07/2020, CKD, HTN, HLD, obesity, CHANTEL and IDDM2 with history of PDR both eyes who presented to [facility] in 05/17/2019 with bilateral vitreous hemorrhages.      His left eye is s/p PPV/MP/retinectomy 12/21/20. 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 There was NVI 06/02/2022    Eye drops  Latanoprost at bedtime right eye  PredForte daily left eye    RETINAL IMAGING  Optical Coherence Tomography: 01/25/23 Right eye: interval resolution in central IR  Left eye: Irregular retina; ERM; nasal to fovea with large bullous edema, temporal with subretinal fibrosis / trace edema, Atrophic thinned retina IT macula. = stable     FA 01/25/23   right eye: PRP scars. Foci of NV superonasal.     Assessment and Plan    # Post-operative state, right eye   - s/p CEIOL OD 2/6/23   - doing well. IOP 24. Will restart latanoprost.    - patch at night   - return precautions discussed    Drops: (Right Eye)  Color  Frequency  - Prednisolone acetate  White  4x/day  - Ofloxacin   Tan  4x/day  - Ketorolac   Gray  4x/day  - Latanoprost   Teal  At bedtime    Drops: (Left Eye)  Color  Frequency  - Prednisolone acetate  White  Every other day        # Proliferative Diabetic Retinopathy, right eye      # Vitreous hemorrhage, right eye -  Resolving (onset 4/2021)   - Responded to multiple Avastin, nearly resolved 08/04/2022   - R/B/A Fill-in PRP right eye 08/24/2022: #764 spots   -  09/14/2022 last Avastin right eye     # Proliferative Diabetic Retinopathy, left eye     - New NVI, left eye 6/2/2022   - Gonio 6/2/2022 broad PAS superiorly ~3 clock hours, narrow PAS inferiorly <1 clock hour, no NVA   - Regressing 08/04/2022   - IOP 08/04/2022 15   - s/p PRP 6/5/2019   - s/p avastin last injection 06/02/2022   - Observe today, return to clinic 6 weeks possible  Avastin left eye      # Funnel RD left eye   - progressed to funnel RD after loss to follow up given heart surgeries     - w retina folded on itself under SO   - guarded prognosis discussed   - Continue PredForte every other day left eye    # Ocuar HTN, both eyes    - Continue Latanoprost at bedtime both eyes    - IOP wnl     ATTESTATION     Attending Physician Attestation:      Complete documentation of historical and exam elements from today's encounter can be found in the full encounter summary report (not reduplicated in this progress note).  I personally obtained the chief complaint(s) and history of present illness.  I confirmed and edited as necessary the review of systems, past medical/surgical history, family history, social history, and examination findings as documented by others; and I examined the patient myself.  I personally reviewed the relevant tests, images, and reports as documented above.  I formulated and edited as necessary the assessment and plan and discussed the findings and management plan with the patient and family    Tacos Caldera MD  Retina Fellow  Department of Ophthalmology  [facility]  Pager: [phone]

## 2023-02-14 ENCOUNTER — LAB (OUTPATIENT)
Dept: LAB | Facility: CLINIC | Age: 68
End: 2023-02-14
Payer: COMMERCIAL

## 2023-02-14 ENCOUNTER — OFFICE VISIT (OUTPATIENT)
Dept: CARDIOLOGY | Facility: CLINIC | Age: 68
End: 2023-02-14
Attending: INTERNAL MEDICINE
Payer: COMMERCIAL

## 2023-02-14 VITALS
WEIGHT: 182.3 LBS | BODY MASS INDEX: 30.34 KG/M2 | SYSTOLIC BLOOD PRESSURE: 116 MMHG | HEART RATE: 91 BPM | DIASTOLIC BLOOD PRESSURE: 75 MMHG | OXYGEN SATURATION: 99 %

## 2023-02-14 DIAGNOSIS — Z23 ENCOUNTER FOR IMMUNIZATION: ICD-10-CM

## 2023-02-14 DIAGNOSIS — Z94.1 HEART TRANSPLANT, ORTHOTOPIC, STATUS (H): ICD-10-CM

## 2023-02-14 DIAGNOSIS — Z94.1 HEART REPLACED BY TRANSPLANT (H): ICD-10-CM

## 2023-02-14 LAB
ANION GAP SERPL CALCULATED.3IONS-SCNC: 9 MMOL/L (ref 7–15)
BASOPHILS # BLD AUTO: 0 10E3/UL (ref 0–0.2)
BASOPHILS NFR BLD AUTO: 0 %
BUN SERPL-MCNC: 31.8 MG/DL (ref 8–23)
CALCIUM SERPL-MCNC: 9.1 MG/DL (ref 8.8–10.2)
CHLORIDE SERPL-SCNC: 102 MMOL/L (ref 98–107)
CMV DNA SPEC NAA+PROBE-ACNC: NOT DETECTED IU/ML
CREAT SERPL-MCNC: 2.28 MG/DL (ref 0.67–1.17)
DEPRECATED HCO3 PLAS-SCNC: 25 MMOL/L (ref 22–29)
EOSINOPHIL # BLD AUTO: 0 10E3/UL (ref 0–0.7)
EOSINOPHIL NFR BLD AUTO: 1 %
ERYTHROCYTE [DISTWIDTH] IN BLOOD BY AUTOMATED COUNT: 14.8 % (ref 10–15)
GFR SERPL CREATININE-BSD FRML MDRD: 30 ML/MIN/1.73M2
GLUCOSE SERPL-MCNC: 170 MG/DL (ref 70–99)
HCT VFR BLD AUTO: 36.8 % (ref 40–53)
HGB BLD-MCNC: 11.8 G/DL (ref 13.3–17.7)
IMM GRANULOCYTES # BLD: 0 10E3/UL
IMM GRANULOCYTES NFR BLD: 0 %
LVEF ECHO: NORMAL
LYMPHOCYTES # BLD AUTO: 1.4 10E3/UL (ref 0.8–5.3)
LYMPHOCYTES NFR BLD AUTO: 23 %
MAGNESIUM SERPL-MCNC: 1.9 MG/DL (ref 1.7–2.3)
MCH RBC QN AUTO: 28 PG (ref 26.5–33)
MCHC RBC AUTO-ENTMCNC: 32.1 G/DL (ref 31.5–36.5)
MCV RBC AUTO: 87 FL (ref 78–100)
MONOCYTES # BLD AUTO: 0.4 10E3/UL (ref 0–1.3)
MONOCYTES NFR BLD AUTO: 7 %
NEUTROPHILS # BLD AUTO: 4.1 10E3/UL (ref 1.6–8.3)
NEUTROPHILS NFR BLD AUTO: 69 %
NRBC # BLD AUTO: 0 10E3/UL
NRBC BLD AUTO-RTO: 0 /100
PHOSPHATE SERPL-MCNC: 2.4 MG/DL (ref 2.5–4.5)
PLATELET # BLD AUTO: 171 10E3/UL (ref 150–450)
POTASSIUM SERPL-SCNC: 4.7 MMOL/L (ref 3.4–5.3)
RBC # BLD AUTO: 4.22 10E6/UL (ref 4.4–5.9)
SIROLIMUS BLD-MCNC: 5.2 UG/L (ref 5–15)
SODIUM SERPL-SCNC: 136 MMOL/L (ref 136–145)
TACROLIMUS BLD-MCNC: 4.5 UG/L (ref 5–15)
TME LAST DOSE: ABNORMAL H
TME LAST DOSE: ABNORMAL H
TME LAST DOSE: NORMAL H
TME LAST DOSE: NORMAL H
WBC # BLD AUTO: 5.9 10E3/UL (ref 4–11)

## 2023-02-14 PROCEDURE — 84100 ASSAY OF PHOSPHORUS: CPT | Performed by: PATHOLOGY

## 2023-02-14 PROCEDURE — 87799 DETECT AGENT NOS DNA QUANT: CPT | Performed by: NURSE PRACTITIONER

## 2023-02-14 PROCEDURE — G0463 HOSPITAL OUTPT CLINIC VISIT: HCPCS | Mod: 25 | Performed by: NURSE PRACTITIONER

## 2023-02-14 PROCEDURE — G0463 HOSPITAL OUTPT CLINIC VISIT: HCPCS

## 2023-02-14 PROCEDURE — 93306 TTE W/DOPPLER COMPLETE: CPT | Performed by: STUDENT IN AN ORGANIZED HEALTH CARE EDUCATION/TRAINING PROGRAM

## 2023-02-14 PROCEDURE — G0008 ADMIN INFLUENZA VIRUS VAC: HCPCS | Performed by: NURSE PRACTITIONER

## 2023-02-14 PROCEDURE — 80195 ASSAY OF SIROLIMUS: CPT | Performed by: NURSE PRACTITIONER

## 2023-02-14 PROCEDURE — 90662 IIV NO PRSV INCREASED AG IM: CPT | Performed by: NURSE PRACTITIONER

## 2023-02-14 PROCEDURE — 83735 ASSAY OF MAGNESIUM: CPT | Performed by: PATHOLOGY

## 2023-02-14 PROCEDURE — 250N000011 HC RX IP 250 OP 636: Performed by: NURSE PRACTITIONER

## 2023-02-14 PROCEDURE — 99215 OFFICE O/P EST HI 40 MIN: CPT | Mod: 25 | Performed by: NURSE PRACTITIONER

## 2023-02-14 PROCEDURE — 80197 ASSAY OF TACROLIMUS: CPT | Performed by: NURSE PRACTITIONER

## 2023-02-14 PROCEDURE — 85025 COMPLETE CBC W/AUTO DIFF WBC: CPT | Performed by: PATHOLOGY

## 2023-02-14 PROCEDURE — 36415 COLL VENOUS BLD VENIPUNCTURE: CPT | Performed by: PATHOLOGY

## 2023-02-14 PROCEDURE — 80048 BASIC METABOLIC PNL TOTAL CA: CPT | Performed by: PATHOLOGY

## 2023-02-14 PROCEDURE — 99207 PR STATISTIC IV PUSH SINGLE INITIAL SUBSTANCE: CPT | Performed by: STUDENT IN AN ORGANIZED HEALTH CARE EDUCATION/TRAINING PROGRAM

## 2023-02-14 RX ADMIN — INFLUENZA A VIRUS A/VICTORIA/2570/2019 IVR-215 (H1N1) ANTIGEN (FORMALDEHYDE INACTIVATED), INFLUENZA A VIRUS A/DARWIN/9/2021 SAN-010 (H3N2) ANTIGEN (FORMALDEHYDE INACTIVATED), INFLUENZA B VIRUS B/PHUKET/3073/2013 ANTIGEN (FORMALDEHYDE INACTIVATED), AND INFLUENZA B VIRUS B/MICHIGAN/01/2021 ANTIGEN (FORMALDEHYDE INACTIVATED) 0.7 ML: 60; 60; 60; 60 INJECTION, SUSPENSION INTRAMUSCULAR at 11:39

## 2023-02-14 RX ADMIN — Medication 5 ML: at 09:57

## 2023-02-14 ASSESSMENT — PAIN SCALES - GENERAL: PAINLEVEL: NO PAIN (0)

## 2023-02-14 NOTE — LETTER
2/14/2023      RE: Lucian Oliveira  [address] [facility] [facility] [id number]       Dear Colleague,    Thank you for the opportunity to participate in the care of your patient, Lucian Oliveira, at the [facility] [place] at [facility]. Please see a copy of my visit note below.    ADULT HEART TRANSPLANT CLINIC  February 14, 2023    HPI:   Mr. Lucian Oliveira is a 67yr old male with a history of CAD (s/p 3v CABG 2008), ICM s/p OHT 7/19/20, DMII, CKD, and HTN who presents to clinic for routine follow up.  A professional  was used for this visit.     His post-operative course was c/b primary graft dysfunction (LVEF 20-25% and severe RV dysfunction, thought to be secondary to prolonged ischemic time, resolved by f/up TTE 7/27/20), VT, pericardial effusion (conservatively managed with resolution), donor streptococcus salivarius bacteremia (s/p 7d course of ceftriaxone), and RUE DVT c/b HIT (s/p PLEX).  He ultimately discharged 8/3/20.     He has been readmitted as follows:  - 8/24/20-8/28/20:  hyperglycemia (BGs 500s), thought to be secondary to incorrect home insulin administration  - 9/22/20-10/5/20:  fevers and drainage from former ICD site, with Chest/abd CT concerning for cellulitis in the epigastric region.  He underwent surgical debridement of his left infraclavicular wound and debridement of midline chest tube wound on 9/23, which was c/b bleeding and required reoperation 9/28 for I&D of hematoma.  Cultures were positive for Pseudomonas aeruginosa, so he was treated with 4 weeks of cipro per Transplant ID and CVTS.  He developed leukopenia and moderate neutropenia, so his cellcept was held then restarted at low dose, and valcyte was stopped.  He received neupogen x 1.  He ultimately discharged with a wound vac.  - 12/9/20:  ED visit for right vision loss, and found to have a vitreous hemorrhage in his right eye and total retinal  "detachment of his left eye per ophthalmology.  He was then seen in the eye clinic 12/10, where he was advised an Avastin injection in the right eye (for proliferative diabetic retinopathy and vitreous hemorrhage).  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  Intraop, he was found to have \"longstanding funnel RD.\"     Rejection history:  none  AlloMap scores:  < 35  DSAs:  none  Coronary angio/Ischemic eval:  Coronary angio 9/2022 showed normal coronary arteries with no angiographic evidence of obstruction (note minimal donor disease in LM).  Last RHC:  9/2022 showed normal biventricular filling pressures, with RA 10, mPA 24, PCW 14, and CI 2.9.  Echo/cMRI: cMRI 9/2022 showed normal biventricular function (LVEF 74%, RVEF 59%) and was negative for ischemia but revealed a new foci of mesocardial/epicardial LGE in the midventricular inferoseptum and anterior segments in a nonischemic pattern.     Since his last visit, he states that he has been doing well.  He remains active, with no exertional concerns.  His breathing has been well, and he denies SOB, PND, and orthopnea.  His appetite has been well, and he is eating regularly without nausea, vomiting, and constipation.  He had some diarrhea yesterday and today.    His weight has been stable, ranging 178-180#, and he denies fluid retention.  His BPs have ranged 110/70s.  He had eye surgery last week, which has been going ok, and he notes that his vision has improved a lot.  He otherwise denies chest pain, palpitations, dizziness, falls, headaches, acute vision changes, fevers, chills, cough, sore throat, and signs of bleeding.      Serostatus:  CMV D+/R+  EBV D+/R+  Toxo D+/R-    Patient Active Problem List    Diagnosis Date Noted     Nuclear senile cataract of right eye 01/25/2023     Priority: Medium     Added automatically from " request for surgery [id number]       Acute embolism and thrombosis of right axillary vein (H) 03/02/2022     Priority: Medium     Atrial flutter, unspecified type (H) 12/21/2021     Priority: Medium     Immunodeficiency, unspecified (H) 02/02/2021     Priority: Medium     CKD (chronic kidney disease) stage 4, GFR 15-29 ml/min (H) 02/02/2021     Priority: Medium     HIT (heparin-induced thrombocytopenia) 02/02/2021     Priority: Medium     Traction detachment of left retina 12/14/2020     Priority: Medium     Added automatically from request for surgery [id number]       Need for prophylactic antibiotic 10/05/2020     Priority: Medium     Sepsis (H) 09/22/2020     Priority: Medium     Heart transplant, orthotopic, status (H) 07/20/2020     Priority: Medium     Dental caries 07/03/2020     Priority: Medium     Added automatically from request for surgery [id number]       Heart replaced by transplant (H) 07/03/2020     Priority: Medium     Added automatically from request for surgery [id number]       Status post coronary angiogram 07/02/2020     Priority: Medium     Coronary artery disease involving native coronary artery of native heart without angina pectoris 06/18/2020     Priority: Medium     Added automatically from request for surgery [id number]       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, unspecified whether long term insulin use (H) 11/27/2019     Priority: Medium     Added automatically from request for surgery [id number]       Nuclear cataract, nonsenile 11/27/2019     Priority: Medium     Added automatically from request for surgery [id number]       CHANTEL (obstructive sleep apnea)- severe (AHI 30) 10/06/2016     Priority: Medium     Study Date: 10/03/2016- (196.0 lbs) apnea/hypopnea index 30.8. REM AHI 40,  supine AHI n/a. RDI  33.1 . Lowest oxygen saturation 82.8%.  Time spent less than or equal to 88% 4.9 minutes.  Time spent less than or equal to 89% 7.3 minutes. CPAP final   pressure 7 cmH2O with AHI of 0.8.  Time in REM supine on final pressure 0 minutes. No consolidated sleep seen in supine position. During diagnostic portion of study, PLM index 18.2. During treatment portion of study,  PLM index 19.3.             Moderate nonproliferative diabetic retinopathy, without macular edema, associated with type 2 diabetes mellitus 08/16/2016     Priority: Medium     Cortical cataract of both eyes 08/15/2016     Priority: Medium     Secondary renal hyperparathyroidism (H) 07/26/2016     Priority: Medium     Proteinuria 03/18/2016     Priority: Medium     Vitamin D deficiency 03/18/2016     Priority: Medium     OA (osteoarthritis) of knee 03/18/2016     Priority: Medium     Chondromalacia of patella, unspecified laterality 03/18/2016     Priority: Medium     Pain in joint of left shoulder 03/18/2016     Priority: Medium     Tinnitus 03/18/2016     Priority: Medium     left        Ptosis of right eyelid 03/18/2016     Priority: Medium     Chronic systolic heart failure (H)      Priority: Medium     Echo 7/2015 LVEF 18%       Automatic implantable cardioverter-defibrillator - [employer] single lead ICD, Not Dependent 08/18/2015     Priority: Medium     Health Care Home 01/19/2015     Priority: Medium     *See Letters for [facility] Care Plan: Emergency Care Plan         CKD (chronic kidney disease) stage 3, GFR 30-59 ml/min (H) 01/28/2013     Priority: Medium     CHF (NYHA class II, ACC/AHA stage C) (H) 08/15/2012     Priority: Medium     Hypertension goal BP (blood pressure) < 140/90 01/21/2011     Priority: Medium     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) 10/31/2010     Priority: Medium     Goal <8%       Hyperlipidemia LDL goal <100 10/31/2010     Priority: Medium     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 (H) 03/15/2010     Priority: Medium     MI and open heart with 1 stent placed in 2008; another minor MI in  2009.  MI on 2/19/15. Coronary angiogram from [facility] on 2/19/15 showed severe 3 vessel native CAD (all three vessels [LAD, LCx and RCA] reported to be 100% ocludded at their ostia). All his grafts were considered to be occluded except for LIMA-to-LAD, which was patent and supplied left-to-left, as well as, left-to-right collaterals. There were no targets suitable for revascularization and patient was put on medical therapy.          PAST MEDICAL HISTORY:  Past Medical History:   Diagnosis Date     CAD (coronary artery disease)      CHF (congestive heart failure) (H)      CKD (chronic kidney disease), stage III (H)      Cortical cataract of both eyes      Diabetes (H)      Hyperlipidemia      Hypertension      Infection due to Streptococcus mitis group 09/23/2020     Ischemic cardiomyopathy      Obesity      CHANTEL (obstructive sleep apnea)     occas cpap     CHANTEL (obstructive sleep apnea)- severe (AHI 30)      Osteoarthritis        CURRENT MEDICATIONS:  Prescription Medications as of 2/14/2023       Rx Number Disp Refills Start End Last Dispensed Date Next Fill Date Owning Pharmacy    acetaminophen (TYLENOL) 325 MG tablet  60 tablet 3 9/2/2020    [facility] - [place], [place] - [address]    Sig: Take 3 tablets (975 mg) by mouth every 8 hours as needed for mild pain    Class: E-Prescribe    Route: Oral    Alcohol Swabs PADS  100 each 0 8/3/2020    [place], [place] - [address]    Sig: Use to swab the area of the injection or sergio as directed   Per insurance coverage    Class: Local Print    aspirin (ASA) 81 MG chewable tablet  120 tablet 0 10/6/2020    [place], [place] - [address]    Sig: Take 1 tablet (81 mg) by mouth daily    Class: E-Prescribe    Route: Oral    blood glucose (NO BRAND SPECIFIED) test strip  400 strip 3 5/13/2022    [facility][id number] - [place], [place] -  [address] AT [address]    Sig: Use to test blood sugar 4 times daily or as directed.    Class: E-Prescribe    blood glucose monitoring (ACCU-CHEK FELIPE SMARTVIEW) meter device kit  1 kit 0 2017    [facility] [place]  [place], [place] - [address] [place] [address]    Sig: Use to test blood sugar 3-4 times daily, as directed.    Class: E-Prescribe    blood glucose monitoring (SOFTCLIX) lancets  400 each 8 3/29/2022    [facility][id number] - [facility] [address] AT [address]    Si each 4 times daily Use to test blood sugars 2 times daily.    Class: E-Prescribe    Route: Does not apply    Calcium Carb-Cholecalciferol (CALCIUM CARBONATE-VITAMIN D3) 600-400 MG-UNIT TABS  180 tablet 3 2022    [facility] [address]    Sig: TAKE ONE TABLET BY MOUTH TWICE A DAY WITH MEALS    Class: E-Prescribe    Dulaglutide (TRULICITY) 4.5 MG/0.5ML SOPN  6 mL 1 2023    [facility] #[id number] - [facility] [address] AT [address]    Sig: Inject 4.5 mg Subcutaneous once a week    Class: E-Prescribe    Route: Subcutaneous    furosemide (LASIX) 20 MG tablet  90 tablet 3 2022    [facility] [address]    Sig: Take 1 tablet (20 mg) by mouth daily    Class: E-Prescribe    Route: Oral    HUMALOG KWIKPEN 100 UNIT/ML soln  15 mL 1 2023    [facility] #[id number] - [facility] [address] AT [address]    Sig: Inject 8-14 Units Subcutaneous 3 times daily (before meals) -199 give 8 units. -229 give 9 units. -259 give 10 units. -289 give 11 units, 290 - 319 - 12 units, 320 - 349 - 13 units, >350 - 14 units    Class: E-Prescribe    Route: Subcutaneous    insulin  UNIT/ML injection  60 mL 3 2023    [facility] #[id number] - [place] - [address] AT [facility] [address] [facility]    Sig:  GIve 40 units each morning and 12-16  units each evening BEFORE meals    Class: E-Prescribe    insulin pen needle (32G X 4 MM) 32G X 4 MM miscellaneous  450 each 3 1/17/2023    [id number] [place] [address] AT [address]    Sig: Use 5-6 pen needles daily or as directed.    Class: E-Prescribe    ketorolac (ACULAR) 0.5 % ophthalmic solution  5 mL 0 2/6/2023    [place] [address]    Sig: Place 1 drop into the right eye 4 times daily    Class: E-Prescribe    Route: Right Eye    latanoprost (XALATAN) 0.005 % ophthalmic solution  15 mL 11 2/3/2022    [id number] - [facility] [address] AT [address]    Sig: Place 1 drop into both eyes At Bedtime    Class: E-Prescribe    Route: Both Eyes    lisinopril (ZESTRIL) 10 MG tablet  60 tablet 11 9/12/2022    [facility]    Sig: Take 1 tablet (10 mg) by mouth 2 times daily    Class: E-Prescribe    Route: Oral    magnesium oxide (MAG-OX) 400 (241.3 Mg) MG tablet  180 tablet 3 10/26/2021    [facility]    Sig: TAKE ONE TABLET BY MOUTH TWICE A DAY    Class: E-Prescribe    MAGNESIUM OXIDE 400 (240 Mg) MG tablet  180 tablet 3 11/4/2022    [facility]    Sig: TAKE ONE TABLET BY MOUTH TWICE A DAY    Class: E-Prescribe    ofloxacin (OCUFLOX) 0.3 % ophthalmic solution  5 mL 0 2/6/2023    [place] [address]    Sig: Place 1 drop into the right eye 4 times daily    Class: E-Prescribe    Route: Right Eye    order for DME  1 Units 0 1/11/2017        Sig: Auto CPAP 8-15 cmH20    Class: [facility] Center    pantoprazole (PROTONIX) 40 MG EC tablet    11/6/2020    [facility] [address]    Sig: Take 40 mg by mouth  every morning    Class: Historical    Route: Oral    prednisoLONE acetate (PRED FORTE) 1 % ophthalmic suspension  5 mL 1 2/6/2023    [facility] [place], [place] - [address]    Sig: Place 1 drop into the right eye 4 times daily    Class: E-Prescribe    Route: Right Eye    prednisoLONE acetate (PRED FORTE) 1 % ophthalmic suspension  10 mL 2 6/2/2022    [facility] #[id number] - [place], [place] - [address] AT [facility]    Sig: Place 1 drop Into the left eye daily    Class: E-Prescribe    Route: Left Eye    prednisoLONE acetate (PRED FORTE) 1 % ophthalmic suspension  1 Bottle 1 1/14/2021    [id number][id number] - [place], [place] - [address] AT [facility]    Sig: Place 1-2 drops Into the left eye 2 times daily    Class: E-Prescribe    Notes to Pharmacy: use 1 drop left eye 2 x daily x 1 week, then 1 drop 1 x daily x 1 week, then stop    Route: Left Eye    rosuvastatin (CRESTOR) 10 MG tablet  90 tablet 3 2/23/2022    [facility]/[place], [place] - [address]    Sig: TAKE ONE TABLET BY MOUTH ONCE DAILY    Class: E-Prescribe    senna-docusate (SENOKOT-S/PERICOLACE) 8.6-50 MG tablet  60 tablet 3 12/1/2021    [facility]/[place], [place] - [address]    Sig: Take 1 tablet by mouth 2 times daily as needed (hold for loose stools)    Class: E-Prescribe    Route: Oral    sirolimus (GENERIC EQUIVALENT) 0.5 MG tablet  120 tablet 11 11/4/2022    [facility]/[place], [place] - [address]    Sig: TAKE FOUR TABLETS BY MOUTH ONCE DAILY    Class: E-Prescribe    No prior authorization was found for this prescription.    Found prior authorization for another prescription for the same medication: Canceled - Other (The medication order is discontinued.)    sulfamethoxazole-trimethoprim (BACTRIM) 400-80 MG tablet  36 tablet 3 11/30/2021    [facility]/[place], [place]  - [address]    Sig: Take 1 tablet by mouth three times a week    Class: E-Prescribe    Route: Oral    tacrolimus (GENERIC EQUIVALENT) 1 MG capsule  180 capsule 11 11/28/2022    [facility] [place], [place] - [address]    Sig: Take 3 capsules (3 mg) by mouth 2 times daily    Class: E-Prescribe    Route: Oral    No prior authorization was found for this prescription.    Found prior authorization for another prescription for the same medication: Closed - Other    tamsulosin (FLOMAX) 0.4 MG capsule  90 capsule 3 9/2/2022    [facility] [place], [place] - [address]    Sig: TAKE ONE CAPSULE BY MOUTH ONCE DAILY    Class: E-Prescribe      Clinic-Administered Medications as of 2/14/2023       Dose Frequency Start End    bevacizumab (AVASTIN) intravitreal inj 1.25 mg 1.25 mg EVERY 28 DAYS 6/2/2022     Admin Instructions: PRN Q3-52 weeks  LE    Route: Intravitreal    bevacizumab (AVASTIN) intravitreal inj 1.25 mg 1.25 mg EVERY 28 DAYS 11/3/2021     Admin Instructions: PRN Q3-52 weeks  RE    Route: Intravitreal          ROS:   Constitutional: No fever, chills, or sweats. Stable weight.   ENT: As per HPI.   Allergies/Immunologic: Negative.   Respiratory: As per HPI.   Cardiovascular: As per HPI.   GI: No nausea, vomiting, hematemesis, melena, or hematochezia.   : No urinary frequency, dysuria, or hematuria.   Integument: Negative.   Psychiatric: Negative.   Neuro: Negative.   Endocrinology: Negative.   Musculoskeletal: Negative.    Exam:  /75 (BP Location: Right arm, Patient Position: Chair, Cuff Size: Adult Regular)   Pulse 91   Wt 82.7 kg (182 lb 4.8 oz)   SpO2 99%   BMI 30.34 kg/m    In general, the patient is a pleasant male in no apparent distress.    HEENT: NC/AT. PERRLA. EOMI.  Sclerae white, not injected.    Neck:  No adenopathy, No thyromegaly.    COR: No jugular venous distention when sitting upright.  RRR.  Normal S1 S2 splits  physiologically.  No murmur, rub click, or gallop.    Lungs:  CTA. No rhonchi.    Abdomen: soft, nontender, nondistended.  No organomegaly.  Extremities:  No clubbing or cyanosis, mild bilateral LE edema to mid calf.  Neuro: Alert & Oriented x 3, grossly non focal.  Integument: no open lesions, rashes, or jaundice.    Labs:  CBC RESULTS:   Lab Results   Component Value Date    WBC 6.1 02/02/2023    WBC 5.1 05/11/2021    RBC 4.34 (L) 02/02/2023    RBC 4.47 05/11/2021    HGB 12.1 (L) 02/02/2023    HGB 13.4 05/11/2021    HCT 37.4 (L) 02/02/2023    HCT 41.5 05/11/2021    MCV 86 02/02/2023    MCV 93 05/11/2021    MCH 27.9 02/02/2023    MCH 30.0 05/11/2021    MCHC 32.4 02/02/2023    MCHC 32.3 05/11/2021    RDW 14.9 02/02/2023    RDW 13.2 05/11/2021     02/02/2023     05/11/2021       BMP RESULTS:  Lab Results   Component Value Date     02/02/2023     05/11/2021    POTASSIUM 4.5 02/02/2023    POTASSIUM 4.4 03/14/2022    POTASSIUM 4.0 05/11/2021    CHLORIDE 102 02/02/2023    CHLORIDE 109 03/14/2022    CHLORIDE 107 05/11/2021    CO2 27 02/02/2023    CO2 26 03/14/2022    CO2 27 05/11/2021    ANIONGAP 8 02/02/2023    ANIONGAP 6 03/14/2022    ANIONGAP 6 05/11/2021     (H) 02/06/2023     (H) 03/14/2022    GLC 98 05/11/2021    BUN 27.6 (H) 02/02/2023    BUN 29 03/14/2022    BUN 34 (H) 05/11/2021    CR 2.26 (H) 02/02/2023    CR 1.84 (H) 05/11/2021    GFRESTIMATED 31 (L) 02/02/2023    GFRESTIMATED 37 (L) 05/11/2021    GFRESTBLACK 43 (L) 05/11/2021    BRYANNA 9.1 02/02/2023    BRYANNA 9.0 05/11/2021      LIPID RESULTS:  Lab Results   Component Value Date    CHOL 119 02/02/2023    CHOL 133 01/05/2021    HDL 32 (L) 02/02/2023    HDL 40 01/05/2021    LDL 14 02/02/2023    LDL 58 01/05/2021    TRIG 365 (H) 02/02/2023    TRIG 179 (H) 01/05/2021    CHOLHDLRATIO 2.6 06/27/2015    NHDL 87 02/02/2023    NHDL 94 01/05/2021       IMMUNOSUPPRESSANT LEVELS  Lab Results   Component Value Date    TACROL 4.1 (L)  09/12/2022    TACROL 5.6 05/11/2021    DOSTAC  09/12/2022      Comment:      Last dose information not provided.    DOSTAC  7pm 5/10/21 05/11/2021    RAPAMY 5.1 10/14/2022       No components found for: CK  Lab Results   Component Value Date    MAG 1.8 02/02/2023    MAG 1.8 05/11/2021     Lab Results   Component Value Date    A1C 8.3 (H) 02/02/2023    A1C 6.7 (H) 01/14/2021     Lab Results   Component Value Date    PHOS 3.0 02/02/2023    PHOS 3.2 05/11/2021     Lab Results   Component Value Date    NTBNPI 8,792 (H) 09/22/2020     Lab Results   Component Value Date    SAITESTMET [facility] 09/12/2022    SAITESTMET [facility] 01/05/2021    SAICELL Class I 09/12/2022    SAICELL Class I 01/05/2021    [id number] None 09/12/2022    [id number] None 01/05/2021    [id number] None 09/12/2022    [id number] None 01/05/2021    SAIREPCOM  09/12/2022      Test performed by modified procedure. Serum heat inactivated and tested by a modified ([place]) protocol including fetal calf serum addition. High-risk, mfi >3,000. Mod-risk, mfi 500-3,000.    SAIREPCOM  01/05/2021      Test performed by modified procedure. Serum heat inactivated and tested   by a modified ([place]) protocol including fetal calf serum addition.   High-risk, mfi >3,000. Mod-risk, mfi 500-3,000.       Lab Results   Component Value Date    SAIITESTME SA FCS 09/12/2022    SAIITESTME SA FCS 01/05/2021    SAIICELL Class II 09/12/2022    SAIICELL Class II 01/05/2021    [id number] None 09/12/2022    [id number] None 01/05/2021    [id number] None 09/12/2022    [id number] None 01/05/2021    SAIIREPCOM  09/12/2022      Test performed by modified procedure. Serum heat inactivated and tested by a modified ([place]) protocol including fetal calf serum addition. High-risk, mfi >3,000. Mod-risk, mfi 500-3,000.    SAIIREPCOM  01/05/2021      Test performed by modified procedure. Serum heat inactivated and tested   by a modified ([place]) protocol including fetal calf serum addition.   High-risk, mfi  >3,000. Mod-risk, mfi 500-3,000.       Lab Results   Component Value Date    CSPEC EDTA PLASMA 05/11/2021       Assessment and Plan:  Mr. Lucian Oliveira is a 67yr old male with a history of CAD (s/p 3v CABG 2008), ICM s/p OHT 7/19/20, DMII, CKD, and HTN who presents to clinic for routine follow up.  A professional  was used for this visit.     Labs today show stable electrolytes.  Creatinine remains stable, 2.2.  Blood counts stable.  CMV, EBV, AlloMap, and tacro levels are in process.    Echo from today shows stable graft function, with LVEF 55-60% and normal RV size/function.    Mr. Oliveira appears well.  His BPs remain controlled.  His weight has been stable, and he appears euvolemic.      He is due for some health maintenance items, which are outline below.    We will plan for him to follow-up in clinic per protocol, or sooner should new concerns arise.      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  He ultimately discharged 8/3/20.     He has been readmitted as follows:  - 8/24/20-8/28/20:  hyperglycemia (BGs 500s), thought to be secondary to incorrect home insulin administration  - 9/22/20-10/5/20:  fevers and drainage from former ICD site, with Chest/abd CT concerning for cellulitis in the epigastric region.  He underwent surgical debridement of his left infraclavicular wound and debridement of midline chest tube wound on 9/23, which was c/b bleeding and required reoperation 9/28 for I&D of hematoma.  Cultures were positive for Pseudomonas aeruginosa, so he was treated with 4 weeks of cipro per Transplant ID and CVTS.  He developed leukopenia and moderate neutropenia, so  "his cellcept was held then restarted at low dose, and valcyte was stopped.  He received neupogen x 1.  He ultimately discharged with a wound vac.  - 12/9/20:  ED visit for right vision loss, and found to have a vitreous hemorrhage in his right eye and total retinal detachment of his left eye per ophthalmology.  He was then seen in the eye clinic 12/10, where he was advised an Avastin injection in the right eye (for proliferative diabetic retinopathy and vitreous hemorrhage).  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  Intraop, he was found to have \"longstanding funnel RD.\"     Rejection history:  none  AlloMap scores:  < 35  DSAs:  none  Coronary angio/Ischemic eval:  Coronary angio 9/2022 showed normal coronary arteries with no angiographic evidence of obstruction (note minimal donor disease in LM).  Last RHC:  9/2022 showed normal biventricular filling pressures, with RA 10, mPA 24, PCW 14, and CI 2.9.  Echo/cMRI: cMRI 9/2022 showed normal biventricular function (LVEF 74%, RVEF 59%) and was negative for ischemia but revealed a new foci of mesocardial/epicardial LGE in the midventricular inferoseptum and anterior segments in a nonischemic pattern.  TTE today, as above.    Serostatus:  - CMV D+/R+  - EBV D+/R+  - Toxo D+/R-     Immunosuppression:  - Sirolimus, goal level 3-5.  Level today in process.  - tacro, goal level 3-5.  Level to be drawn today.  (Combined goal of 8-10)     PPx:  - CAV:  Aspirin 81mg daily and rosuvastatin 10mg daily  - GI:  Pantoprazole 40mg daily  - Osteoporosis:  Calcium/vitamin D supplements  - Toxo:  Single strength bactrim three times per week, lifelong ppx     Graft function:  - BPs:  Stable, continue lisinopril 10mg twice daily  - fluid status:  Euvolemic, taking lasix 20mg daily     Health maintenance:  - derm exam scheduled for next week  - eye exam " "UTD   - dental exam overdue, been 2 years -- discussed getting this done at his earliest convenience  - last colo 2019, due 2022 -- overdue, GI referral sent  - COVID booster 10/2022, due April 2023  - needs flu shot -- will give today  - pneumonia shots unclear --> discuss with PCP  - completed shingrix        Hypertriglyceridemia  Triglycerides remain elevated, were 365 2/2/23.  Will review ongoing plan with Dr. Sheridan, and will consider referral to preventive cardiology for further management.  Again discussed the importance of heart healthy dieting and regular aerobic activity.     DMII  Follows with endo and PCP.     HIT  HIT antibody + 7/2020.  CORRINE +.  No heparin products.     RIJ and axillary vein DVT, nonocclusive  Right cephalic vein occlusive thrombus  S/p course of anticoagulation.     Total retinal detachment of the left eye, s/p retinectomy 12/21/20  Proliferative diabetic retinopathy  Right vitreous hemorrhage  He presented to the ED 12/9/20 with right vision loss.  Ophthalmology was consulted, and found that he had a vitreous hemorrhage in his right eye and total retinal detachment of his left eye.  He was then seen in the eye clinic 12/10/20, where he was advised an Avastin injection in the right eye (for proliferative diabetic retinopathy and vitreous hemorrhage).  He then underwent \"27 gauge pars plana vitectomy, membrane peel, perfluoroctane liquid (PFO), retinectomy, endolaser\" 12/21/20 for the tractional retinal detachment of his left eye.  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  He continues to follow with ophthalmology, and notes improvement in his right-eye vision.         ADDENDUM:  Spoke with Dr. Sheridan, and will plan for him to have a renal consult re: is persistently elevated creatinine.  Will also plan for him to stop tacrolimus,start MMF 1500mg twice daily, and increase sirolumus goal to " 6-8 six weeks prior to his 3rd annual surveillance.    Will also increase rosuvastatin to 20mg daily, advise that he work on increased heart-healthy diet and regular aerobic activity, and better BG control.  If triglycerides remain elevated after these measures, will plan to refer him to preventive cardiology for further management.        The above was reviewed with Mr. Oliveira, who verbalized understanding and will call with further questions/concerns.    50 minutes spent with patient, along with preparing for visit, reviewing follow up, and completing documentation.      Arlin Guajardo DNP, [facility]  Advanced Heart Failure Nurse Practitioner  [facility]  Patient Care Team:  No Ref-Primary, Physician as PCP - Diane Groves APRN CNP as Nurse Practitioner (Cardiology)  Yue Dowd MD as MD (INTERNAL MEDICINE - ENDOCRINOLOGY, DIABETES & METABOLISM)  Christelle Pettit, RN as Transplant Coordinator (Cardiology)  Negrito Rai [facility] as Pharmacist (Pharmacist)  [facility] ([facility] ([facility] ([place]  Triny Bunch MD as Assigned Surgical Provider  Marisabel Prieto PAZORAIDA as Assigned Endocrinology Provider  Arlin Guajardo NP as Referring Physician (Interventional Cardiology)  Fracisco Casiano MD as Assigned PCP  Cristian Delacruz MD as MD (Dermatology)  ODALYS SIMON

## 2023-02-14 NOTE — PROGRESS NOTES
"[facility]  February 14, 2023    HPI:   Mr. Lucian Oliveira is a 67yr old male with a history of CAD (s/p 3v CABG 2008), ICM s/p OHT 7/19/20, DMII, CKD, and HTN who presents to clinic for routine follow up.  A professional  was used for this visit.     His post-operative course was c/b primary graft dysfunction (LVEF 20-25% and severe RV dysfunction, thought to be secondary to prolonged ischemic time, resolved by f/up TTE 7/27/20), VT, pericardial effusion (conservatively managed with resolution), donor streptococcus salivarius bacteremia (s/p 7d course of ceftriaxone), and RUE DVT c/b HIT (s/p PLEX).  He ultimately discharged 8/3/20.     He has been readmitted as follows:  - 8/24/20-8/28/20:  hyperglycemia (BGs 500s), thought to be secondary to incorrect home insulin administration  - 9/22/20-10/5/20:  fevers and drainage from former ICD site, with Chest/abd CT concerning for cellulitis in the epigastric region.  He underwent surgical debridement of his left infraclavicular wound and debridement of midline chest tube wound on 9/23, which was c/b bleeding and required reoperation 9/28 for I&D of hematoma.  Cultures were positive for Pseudomonas aeruginosa, so he was treated with 4 weeks of cipro per Transplant ID and CVTS.  He developed leukopenia and moderate neutropenia, so his cellcept was held then restarted at low dose, and valcyte was stopped.  He received neupogen x 1.  He ultimately discharged with a wound vac.  - 12/9/20:  ED visit for right vision loss, and found to have a vitreous hemorrhage in his right eye and total retinal detachment of his left eye per ophthalmology.  He was then seen in the eye clinic 12/10, where he was advised an Avastin injection in the right eye (for proliferative diabetic retinopathy and vitreous hemorrhage).  He then underwent \"27 gauge pars plana vitectomy, membrane peel, perfluoroctane liquid (PFO), retinectomy, endolaser\" 12/21 for the tractional " "retinal detachment of his left eye.  Intraop, he was found to have \"longstanding funnel RD.\"     Rejection history:  none  AlloMap scores:  < 35  DSAs:  none  Coronary angio/Ischemic eval:  Coronary angio 9/2022 showed normal coronary arteries with no angiographic evidence of obstruction (note minimal donor disease in LM).  Last RHC:  9/2022 showed normal biventricular filling pressures, with RA 10, mPA 24, PCW 14, and CI 2.9.  Echo/cMRI: cMRI 9/2022 showed normal biventricular function (LVEF 74%, RVEF 59%) and was negative for ischemia but revealed a new foci of mesocardial/epicardial LGE in the midventricular inferoseptum and anterior segments in a nonischemic pattern.     Since his last visit, he states that he has been doing well.  He remains active, with no exertional concerns.  His breathing has been well, and he denies SOB, PND, and orthopnea.  His appetite has been well, and he is eating regularly without nausea, vomiting, and constipation.  He had some diarrhea yesterday and today.    His weight has been stable, ranging 178-180#, and he denies fluid retention.  His BPs have ranged 110/70s.  He had eye surgery last week, which has been going ok, and he notes that his vision has improved a lot.  He otherwise denies chest pain, palpitations, dizziness, falls, headaches, acute vision changes, fevers, chills, cough, sore throat, and signs of bleeding.      Serostatus:  CMV D+/R+  EBV D+/R+  Toxo D+/R-    Patient Active Problem List    Diagnosis Date Noted     Nuclear senile cataract of right eye 01/25/2023     Priority: Medium     Added automatically from request for surgery [id number]       Acute embolism and thrombosis of right axillary vein (H) 03/02/2022     Priority: Medium     Atrial flutter, unspecified type (H) 12/21/2021     Priority: Medium     Immunodeficiency, unspecified (H) 02/02/2021     Priority: Medium     CKD (chronic kidney disease) stage 4, GFR 15-29 ml/min (H) 02/02/2021     Priority: Medium "     HIT (heparin-induced thrombocytopenia) 02/02/2021     Priority: Medium     Traction detachment of left retina 12/14/2020     Priority: Medium     Added automatically from request for surgery [id number]       Need for prophylactic antibiotic 10/05/2020     Priority: Medium     Sepsis (H) 09/22/2020     Priority: Medium     Heart transplant, orthotopic, status (H) 07/20/2020     Priority: Medium     Dental caries 07/03/2020     Priority: Medium     Added automatically from request for surgery [id number]       Heart replaced by transplant (H) 07/03/2020     Priority: Medium     Added automatically from request for surgery [id number]       Status post coronary angiogram 07/02/2020     Priority: Medium     Coronary artery disease involving native coronary artery of native heart without angina pectoris 06/18/2020     Priority: Medium     Added automatically from request for surgery [id number]       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, unspecified whether long term insulin use (H) 11/27/2019     Priority: Medium     Added automatically from request for surgery [id number]       Nuclear cataract, nonsenile 11/27/2019     Priority: Medium     Added automatically from request for surgery [id number]       CHANTEL (obstructive sleep apnea)- severe (AHI 30) 10/06/2016     Priority: Medium     Study Date: 10/03/2016- (196.0 lbs) apnea/hypopnea index 30.8. REM AHI 40,  supine AHI n/a. RDI  33.1 . Lowest oxygen saturation 82.8%.  Time spent less than or equal to 88% 4.9 minutes.  Time spent less than or equal to 89% 7.3 minutes. CPAP final  pressure 7 cmH2O with AHI of 0.8.  Time in REM supine on final pressure 0 minutes. No consolidated sleep seen in supine position. During diagnostic portion of study, PLM index 18.2. During treatment portion of study,  PLM index 19.3.             Moderate nonproliferative diabetic retinopathy, without macular edema, associated with type 2 diabetes mellitus  08/16/2016     Priority: Medium     Cortical cataract of both eyes 08/15/2016     Priority: Medium     Secondary renal hyperparathyroidism (H) 07/26/2016     Priority: Medium     Proteinuria 03/18/2016     Priority: Medium     Vitamin D deficiency 03/18/2016     Priority: Medium     OA (osteoarthritis) of knee 03/18/2016     Priority: Medium     Chondromalacia of patella, unspecified laterality 03/18/2016     Priority: Medium     Pain in joint of left shoulder 03/18/2016     Priority: Medium     Tinnitus 03/18/2016     Priority: Medium     left        Ptosis of right eyelid 03/18/2016     Priority: Medium     Chronic systolic heart failure (H)      Priority: Medium     Echo 7/2015 LVEF 18%       Automatic implantable cardioverter-defibrillator - [employer] single lead ICD, Not Dependent 08/18/2015     Priority: Medium     Health Care Home 01/19/2015     Priority: Medium     *See Letters for [facility] Care Plan: Emergency Care Plan         CKD (chronic kidney disease) stage 3, GFR 30-59 ml/min (H) 01/28/2013     Priority: Medium     CHF (NYHA class II, ACC/AHA stage C) (H) 08/15/2012     Priority: Medium     Hypertension goal BP (blood pressure) < 140/90 01/21/2011     Priority: Medium     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) 10/31/2010     Priority: Medium     Goal <8%       Hyperlipidemia LDL goal <100 10/31/2010     Priority: Medium     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 (H) 03/15/2010     Priority: Medium     MI and open heart with 1 stent placed in 2008; another minor MI in 2009.  MI on 2/19/15. Coronary angiogram from [facility] on 2/19/15 showed severe 3 vessel native CAD (all three vessels [LAD, LCx and RCA] reported to be 100% ocludded at their ostia). All his grafts were considered to be occluded except for LIMA-to-LAD, which was patent and supplied left-to-left, as well as, left-to-right  collaterals. There were no targets suitable for revascularization and patient was put on medical therapy.          PAST MEDICAL HISTORY:  Past Medical History:   Diagnosis Date     CAD (coronary artery disease)      CHF (congestive heart failure) (H)      CKD (chronic kidney disease), stage III (H)      Cortical cataract of both eyes      Diabetes (H)      Hyperlipidemia      Hypertension      Infection due to Streptococcus mitis group 09/23/2020     Ischemic cardiomyopathy      Obesity      CHANTEL (obstructive sleep apnea)     occas cpap     CHANTEL (obstructive sleep apnea)- severe (AHI 30)      Osteoarthritis        CURRENT MEDICATIONS:  Prescription Medications as of 2/14/2023       Rx Number Disp Refills Start End Last Dispensed Date Next Fill Date Owning Pharmacy    acetaminophen (TYLENOL) 325 MG tablet  60 tablet 3 9/2/2020    [facility] - [place], [place] - [address]    Sig: Take 3 tablets (975 mg) by mouth every 8 hours as needed for mild pain    Class: E-Prescribe    Route: Oral    Alcohol Swabs PADS  100 each 0 8/3/2020    [place], [place] - [address] [place] [address]    Sig: Use to swab the area of the injection or sergio as directed   Per insurance coverage    Class: Local Print    aspirin (ASA) 81 MG chewable tablet  120 tablet 0 10/6/2020    [place], [place] - [address]     Sig: Take 1 tablet (81 mg) by mouth daily    Class: E-Prescribe    Route: Oral    blood glucose (NO BRAND SPECIFIED) test strip  400 strip 3 5/13/2022    [id number] - [place], [place] - [address] [place] [facility] [place] [address] [facility]    Sig: Use to test blood sugar 4 times daily or as directed.    Class: E-Prescribe    blood glucose monitoring (ACCU-CHEK FELIPE SMARTVIEW) meter device kit  1 kit 0 2/20/2017    [facility] [place] - [address]    Sig: Use to test blood sugar 3-4 times daily, as directed.     Class: E-Prescribe    blood glucose monitoring (SOFTCLIX) lancets  400 each 8 3/29/2022    [id number] - [place], [place] [address] AT [address]    Si each 4 times daily Use to test blood sugars 2 times daily.    Class: E-Prescribe    Route: Does not apply    Calcium Carb-Cholecalciferol (CALCIUM CARBONATE-VITAMIN D3) 600-400 MG-UNIT TABS  180 tablet 3 2022    [facility] [address]    Sig: TAKE ONE TABLET BY MOUTH TWICE A DAY WITH MEALS    Class: E-Prescribe    Dulaglutide (TRULICITY) 4.5 MG/0.5ML SOPN  6 mL 1 2023    [id number] - [address] AT [address]    Sig: Inject 4.5 mg Subcutaneous once a week    Class: E-Prescribe    Route: Subcutaneous    furosemide (LASIX) 20 MG tablet  90 tablet 3 2022    [facility] [address]    Sig: Take 1 tablet (20 mg) by mouth daily    Class: E-Prescribe    Route: Oral    HUMALOG KWIKPEN 100 UNIT/ML soln  15 mL 1 2023    [facility] [place] [address] [address]    Sig: Inject 8-14 Units Subcutaneous 3 times daily (before meals) -199 give 8 units. -229 give 9 units. -259 give 10 units. -289 give 11 units, 290 - 319 - 12 units, 320 - 349 - 13 units, >350 - 14 units    Class: E-Prescribe    Route: Subcutaneous    insulin  UNIT/ML injection  60 mL 3 2023    [id number] [place] [address], [place] [address] AT [address]    Sig: GIve 40 units each morning and 12-16  units each evening BEFORE meals    Class: E-Prescribe    insulin pen needle (32G X 4 MM) 32G X 4 MM miscellaneous  450 each 3 2023    [facility] [place] [address], [address] AT [place] [address]    Sig: Use 5-6 pen needles daily or as directed.    Class: E-Prescribe    ketorolac  (ACULAR) 0.5 % ophthalmic solution  5 mL 0 2/6/2023    [facility] [address]    Sig: Place 1 drop into the right eye 4 times daily    Class: E-Prescribe    Route: Right Eye    latanoprost (XALATAN) 0.005 % ophthalmic solution  15 mL 11 2/3/2022    [facility] #[id number] - [place], [place] - [address] [place] [facility] [place] [address]    Sig: Place 1 drop into both eyes At Bedtime    Class: E-Prescribe    Route: Both Eyes    lisinopril (ZESTRIL) 10 MG tablet  60 tablet 11 9/12/2022    [facility] [address]    Sig: Take 1 tablet (10 mg) by mouth 2 times daily    Class: E-Prescribe    Route: Oral    magnesium oxide (MAG-OX) 400 (241.3 Mg) MG tablet  180 tablet 3 10/26/2021    [facility] [place] [address]    Sig: TAKE ONE TABLET BY MOUTH TWICE A DAY    Class: E-Prescribe    MAGNESIUM OXIDE 400 (240 Mg) MG tablet  180 tablet 3 11/4/2022    [facility] [address] [address]    Sig: TAKE ONE TABLET BY MOUTH TWICE A DAY    Class: E-Prescribe    ofloxacin (OCUFLOX) 0.3 % ophthalmic solution  5 mL 0 2/6/2023    [facility] [address]    Sig: Place 1 drop into the right eye 4 times daily    Class: E-Prescribe    Route: Right Eye    order for DME  1 Units 0 1/11/2017        Sig: Auto CPAP 8-15 cmH20    Class: [facility]    pantoprazole (PROTONIX) 40 MG EC tablet    11/6/2020    [facility] [address] [address]    Sig: Take 40 mg by mouth every morning    Class: Historical    Route: Oral    prednisoLONE acetate (PRED FORTE) 1 % ophthalmic suspension  5 mL 1 2/6/2023    [facility] [address]    Sig: Place 1 drop into the right eye 4 times daily    Class: E-Prescribe    Route: Right Eye    prednisoLONE acetate (PRED FORTE) 1 % ophthalmic  suspension  10 mL 2 6/2/2022    [facility] #[id number] - [place], [place] - [address] AT [facility]    Sig: Place 1 drop Into the left eye daily    Class: E-Prescribe    Route: Left Eye    prednisoLONE acetate (PRED FORTE) 1 % ophthalmic suspension  1 Bottle 1 1/14/2021    [facility] #[id number] - [place], [place] - [address] AT [facility] & [facility]    Sig: Place 1-2 drops Into the left eye 2 times daily    Class: E-Prescribe    Notes to Pharmacy: use 1 drop left eye 2 x daily x 1 week, then 1 drop 1 x daily x 1 week, then stop    Route: Left Eye    rosuvastatin (CRESTOR) 10 MG tablet  90 tablet 3 2/23/2022    [facility] [address] [address]    Sig: TAKE ONE TABLET BY MOUTH ONCE DAILY    Class: E-Prescribe    senna-docusate (SENOKOT-S/PERICOLACE) 8.6-50 MG tablet  60 tablet 3 12/1/2021    [facility] [address] [address]    Sig: Take 1 tablet by mouth 2 times daily as needed (hold for loose stools)    Class: E-Prescribe    Route: Oral    sirolimus (GENERIC EQUIVALENT) 0.5 MG tablet  120 tablet 11 11/4/2022    [facility] [address] [address]    Sig: TAKE FOUR TABLETS BY MOUTH ONCE DAILY    Class: E-Prescribe    No prior authorization was found for this prescription.    Found prior authorization for another prescription for the same medication: Canceled - Other (The medication order is discontinued.)    sulfamethoxazole-trimethoprim (BACTRIM) 400-80 MG tablet  36 tablet 3 11/30/2021    [facility] [address] [address]    Sig: Take 1 tablet by mouth three times a week    Class: E-Prescribe    Route: Oral    tacrolimus (GENERIC EQUIVALENT) 1 MG capsule  180 capsule 11 11/28/2022    [facility] [address] [address]    Sig: Take 3 capsules (3 mg) by mouth 2 times daily    Class: E-Prescribe    Route:  Oral    No prior authorization was found for this prescription.    Found prior authorization for another prescription for the same medication: Closed - Other    tamsulosin (FLOMAX) 0.4 MG capsule  90 capsule 3 9/2/2022    [facility] - [place], [place] - [address]    Sig: TAKE ONE CAPSULE BY MOUTH ONCE DAILY    Class: E-Prescribe      Clinic-Administered Medications as of 2/14/2023       Dose Frequency Start End    bevacizumab (AVASTIN) intravitreal inj 1.25 mg 1.25 mg EVERY 28 DAYS 6/2/2022     Admin Instructions: PRN Q3-52 weeks  LE    Route: Intravitreal    bevacizumab (AVASTIN) intravitreal inj 1.25 mg 1.25 mg EVERY 28 DAYS 11/3/2021     Admin Instructions: PRN Q3-52 weeks  RE    Route: Intravitreal          ROS:   Constitutional: No fever, chills, or sweats. Stable weight.   ENT: As per HPI.   Allergies/Immunologic: Negative.   Respiratory: As per HPI.   Cardiovascular: As per HPI.   GI: No nausea, vomiting, hematemesis, melena, or hematochezia.   : No urinary frequency, dysuria, or hematuria.   Integument: Negative.   Psychiatric: Negative.   Neuro: Negative.   Endocrinology: Negative.   Musculoskeletal: Negative.    Exam:  /75 (BP Location: Right arm, Patient Position: Chair, Cuff Size: Adult Regular)   Pulse 91   Wt 82.7 kg (182 lb 4.8 oz)   SpO2 99%   BMI 30.34 kg/m    In general, the patient is a pleasant male in no apparent distress.    HEENT: NC/AT. PERRLA. EOMI.  Sclerae white, not injected.    Neck:  No adenopathy, No thyromegaly.    COR: No jugular venous distention when sitting upright.  RRR.  Normal S1 S2 splits physiologically.  No murmur, rub click, or gallop.    Lungs:  CTA. No rhonchi.    Abdomen: soft, nontender, nondistended.  No organomegaly.  Extremities:  No clubbing or cyanosis, mild bilateral LE edema to mid calf.  Neuro: Alert & Oriented x 3, grossly non focal.  Integument: no open lesions, rashes, or jaundice.    Labs:  CBC RESULTS:   Lab Results    Component Value Date    WBC 6.1 02/02/2023    WBC 5.1 05/11/2021    RBC 4.34 (L) 02/02/2023    RBC 4.47 05/11/2021    HGB 12.1 (L) 02/02/2023    HGB 13.4 05/11/2021    HCT 37.4 (L) 02/02/2023    HCT 41.5 05/11/2021    MCV 86 02/02/2023    MCV 93 05/11/2021    MCH 27.9 02/02/2023    MCH 30.0 05/11/2021    MCHC 32.4 02/02/2023    MCHC 32.3 05/11/2021    RDW 14.9 02/02/2023    RDW 13.2 05/11/2021     02/02/2023     05/11/2021       BMP RESULTS:  Lab Results   Component Value Date     02/02/2023     05/11/2021    POTASSIUM 4.5 02/02/2023    POTASSIUM 4.4 03/14/2022    POTASSIUM 4.0 05/11/2021    CHLORIDE 102 02/02/2023    CHLORIDE 109 03/14/2022    CHLORIDE 107 05/11/2021    CO2 27 02/02/2023    CO2 26 03/14/2022    CO2 27 05/11/2021    ANIONGAP 8 02/02/2023    ANIONGAP 6 03/14/2022    ANIONGAP 6 05/11/2021     (H) 02/06/2023     (H) 03/14/2022    GLC 98 05/11/2021    BUN 27.6 (H) 02/02/2023    BUN 29 03/14/2022    BUN 34 (H) 05/11/2021    CR 2.26 (H) 02/02/2023    CR 1.84 (H) 05/11/2021    GFRESTIMATED 31 (L) 02/02/2023    GFRESTIMATED 37 (L) 05/11/2021    GFRESTBLACK 43 (L) 05/11/2021    BRYANNA 9.1 02/02/2023    BRYANNA 9.0 05/11/2021      LIPID RESULTS:  Lab Results   Component Value Date    CHOL 119 02/02/2023    CHOL 133 01/05/2021    HDL 32 (L) 02/02/2023    HDL 40 01/05/2021    LDL 14 02/02/2023    LDL 58 01/05/2021    TRIG 365 (H) 02/02/2023    TRIG 179 (H) 01/05/2021    CHOLHDLRATIO 2.6 06/27/2015    NHDL 87 02/02/2023    NHDL 94 01/05/2021       IMMUNOSUPPRESSANT LEVELS  Lab Results   Component Value Date    TACROL 4.1 (L) 09/12/2022    TACROL 5.6 05/11/2021    DOSTAC  09/12/2022      Comment:      Last dose information not provided.    DOSTAC  7pm 5/10/21 05/11/2021    RAPAMY 5.1 10/14/2022       No components found for: CK  Lab Results   Component Value Date    MAG 1.8 02/02/2023    MAG 1.8 05/11/2021     Lab Results   Component Value Date    A1C 8.3 (H) 02/02/2023    A1C  6.7 (H) 01/14/2021     Lab Results   Component Value Date    PHOS 3.0 02/02/2023    PHOS 3.2 05/11/2021     Lab Results   Component Value Date    NTBNPI 8,792 (H) 09/22/2020     Lab Results   Component Value Date    SAITESTMET [facility] 09/12/2022    SAITESTMET [facility] 01/05/2021    SAICELL Class I 09/12/2022    SAICELL Class I 01/05/2021    [id number] None 09/12/2022    [id number] None 01/05/2021    [id number] None 09/12/2022    [id number] None 01/05/2021    SAIREPCOM  09/12/2022      Test performed by modified procedure. Serum heat inactivated and tested by a modified ([place]) protocol including fetal calf serum addition. High-risk, mfi >3,000. Mod-risk, mfi 500-3,000.    SAIREPCOM  01/05/2021      Test performed by modified procedure. Serum heat inactivated and tested   by a modified ([place]) protocol including fetal calf serum addition.   High-risk, mfi >3,000. Mod-risk, mfi 500-3,000.       Lab Results   Component Value Date    SAIITESTME [facility] 09/12/2022    SAIITESTME [facility] 01/05/2021    SAIICELL Class II 09/12/2022    SAIICELL Class II 01/05/2021    [id number] None 09/12/2022    [id number] None 01/05/2021    [id number] None 09/12/2022    [id number] None 01/05/2021    SAIIREPCOM  09/12/2022      Test performed by modified procedure. Serum heat inactivated and tested by a modified ([place]) protocol including fetal calf serum addition. High-risk, mfi >3,000. Mod-risk, mfi 500-3,000.    SAIIREPCOM  01/05/2021      Test performed by modified procedure. Serum heat inactivated and tested   by a modified ([place]) protocol including fetal calf serum addition.   High-risk, mfi >3,000. Mod-risk, mfi 500-3,000.       Lab Results   Component Value Date    CSPEC EDTA PLASMA 05/11/2021       Assessment and Plan:  Mr. Lucian Oliveira is a 67yr old male with a history of CAD (s/p 3v CABG 2008), ICM s/p OHT 7/19/20, DMII, CKD, and HTN who presents to clinic for routine follow up.  A professional  was used for this  visit.     Labs today show stable electrolytes.  Creatinine remains stable, 2.2.  Blood counts stable.  CMV, EBV, AlloMap, and tacro levels are in process.    Echo from today shows stable graft function, with LVEF 55-60% and normal RV size/function.    Mr. Oliveira appears well.  His BPs remain controlled.  His weight has been stable, and he appears euvolemic.      He is due for some health maintenance items, which are outline below.    We will plan for him to follow-up in clinic per protocol, or sooner should new concerns arise.      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  He ultimately discharged 8/3/20.     He has been readmitted as follows:  - 8/24/20-8/28/20:  hyperglycemia (BGs 500s), thought to be secondary to incorrect home insulin administration  - 9/22/20-10/5/20:  fevers and drainage from former ICD site, with Chest/abd CT concerning for cellulitis in the epigastric region.  He underwent surgical debridement of his left infraclavicular wound and debridement of midline chest tube wound on 9/23, which was c/b bleeding and required reoperation 9/28 for I&D of hematoma.  Cultures were positive for Pseudomonas aeruginosa, so he was treated with 4 weeks of cipro per Transplant ID and CVTS.  He developed leukopenia and moderate neutropenia, so his cellcept was held then restarted at low dose, and valcyte was stopped.  He received neupogen x 1.  He ultimately discharged with a wound vac.  - 12/9/20:  ED visit for right vision loss, and found to have a vitreous hemorrhage in his right eye and total retinal detachment of his left eye per ophthalmology.  He was then seen in the eye  "clinic 12/10, where he was advised an Avastin injection in the right eye (for proliferative diabetic retinopathy and vitreous hemorrhage).  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  Intraop, he was found to have \"longstanding funnel RD.\"     Rejection history:  none  AlloMap scores:  < 35  DSAs:  none  Coronary angio/Ischemic eval:  Coronary angio 9/2022 showed normal coronary arteries with no angiographic evidence of obstruction (note minimal donor disease in LM).  Last RHC:  9/2022 showed normal biventricular filling pressures, with RA 10, mPA 24, PCW 14, and CI 2.9.  Echo/cMRI: cMRI 9/2022 showed normal biventricular function (LVEF 74%, RVEF 59%) and was negative for ischemia but revealed a new foci of mesocardial/epicardial LGE in the midventricular inferoseptum and anterior segments in a nonischemic pattern.  TTE today, as above.    Serostatus:  - CMV D+/R+  - EBV D+/R+  - Toxo D+/R-     Immunosuppression:  - Sirolimus, goal level 3-5.  Level today in process.  - tacro, goal level 3-5.  Level to be drawn today.  (Combined goal of 8-10)     PPx:  - CAV:  Aspirin 81mg daily and rosuvastatin 10mg daily  - GI:  Pantoprazole 40mg daily  - Osteoporosis:  Calcium/vitamin D supplements  - Toxo:  Single strength bactrim three times per week, lifelong ppx     Graft function:  - BPs:  Stable, continue lisinopril 10mg twice daily  - fluid status:  Euvolemic, taking lasix 20mg daily     Health maintenance:  - derm exam scheduled for next week  - eye exam UTD   - dental exam overdue, been 2 years -- discussed getting this done at his earliest convenience  - last colo 2019, due 2022 -- overdue, GI referral sent  - COVID booster 10/2022, due April 2023  - needs flu shot -- will give today  - pneumonia shots unclear --> discuss with PCP  - completed " "shingrix        Hypertriglyceridemia  Triglycerides remain elevated, were 365 2/2/23.  Will review ongoing plan with Dr. Sheridan, and will consider referral to preventive cardiology for further management.  Again discussed the importance of heart healthy dieting and regular aerobic activity.     DMII  Follows with endo and PCP.     HIT  HIT antibody + 7/2020.  CORRINE +.  No heparin products.     RIJ and axillary vein DVT, nonocclusive  Right cephalic vein occlusive thrombus  S/p course of anticoagulation.     Total retinal detachment of the left eye, s/p retinectomy 12/21/20  Proliferative diabetic retinopathy  Right vitreous hemorrhage  He presented to the ED 12/9/20 with right vision loss.  Ophthalmology was consulted, and found that he had a vitreous hemorrhage in his right eye and total retinal detachment of his left eye.  He was then seen in the eye clinic 12/10/20, where he was advised an Avastin injection in the right eye (for proliferative diabetic retinopathy and vitreous hemorrhage).  He then underwent \"27 gauge pars plana vitectomy, membrane peel, perfluoroctane liquid (PFO), retinectomy, endolaser\" 12/21/20 for the tractional retinal detachment of his left eye.  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  He continues to follow with ophthalmology, and notes improvement in his right-eye vision.         ADDENDUM:  Spoke with Dr. Sheridan, and will plan for him to have a renal consult re: is persistently elevated creatinine.  Will also plan for him to stop tacrolimus,start MMF 1500mg twice daily, and increase sirolumus goal to 6-8 six weeks prior to his 3rd annual surveillance.    Will also increase rosuvastatin to 20mg daily, advise that he work on increased heart-healthy diet and regular aerobic activity, and better BG control.  If triglycerides remain elevated after these measures, will plan to refer him to " preventive cardiology for further management.        The above was reviewed with Mr. Oliveira, who verbalized understanding and will call with further questions/concerns.    50 minutes spent with patient, along with preparing for visit, reviewing follow up, and completing documentation.      Arlin Guajardo DNP, [facility]-BC  Advanced Heart Failure Nurse Practitioner  [facility]  Patient Care Team:  No Ref-Primary, Physician as PCP - Diane Groves APRN CNP as Nurse Practitioner (Cardiology)  Arvin Sheridan MD as MD (Cardiology)  Yue Dowd MD as MD (INTERNAL MEDICINE - ENDOCRINOLOGY, DIABETES & METABOLISM)  Christelle Pettit, RN as Transplant Coordinator (Cardiology)  Negrito Rai [facility] as Pharmacist (Pharmacist)  [facility] ([facility] ([facility] ([place]  Triny Bunch MD as Assigned Surgical Provider  Marisabel Prieto PA-C as Assigned Endocrinology Provider  Arlin Guajardo NP as Referring Physician (Interventional Cardiology)  Triny Bunch MD as MD (Ophthalmology)  Fracisco Casiano MD as Assigned PCP  Arvin Sheridan MD as Assigned Heart and Vascular Provider  Arvin Sheridan MD as Referring Physician (Cardiovascular Disease)  Cristian Delacruz MD as MD (Dermatology)  ODALYS SIMON

## 2023-02-14 NOTE — PATIENT INSTRUCTIONS
Patient Instructions  1. Please get your Covid booster in April.  2. Christelle placed a GI referral for a colonoscopy- they will call you to schedule.  3. Christelle will call with any remaining results.    Next transplant clinic appointment:  In July for 3rd annual  Next lab draw:   Coordinator will call with all pending results.     Please call transplant coordinator with any questions:    Christelle Pettit RN BSN   Post Heart Transplant Nurse Coordinator  [facility]  Questions: [phone]

## 2023-02-14 NOTE — NURSING NOTE
Chief Complaint   Patient presents with     Follow Up     June 22, 2020: Reason for visit: RTN HF: 64 year old male presents with systolic heart failure for follow up      Vitals were taken and medications reconciled.    LEYLA Yang  10:56 AM

## 2023-02-14 NOTE — NURSING NOTE
Transplant Coordinator Note    Reason for visit: 30 month visit  Coordinator: Present   Caregiver:  wife    Health concerns addressed today:  1. Had eye surgery last week- vision has improved quite a bit.  2.   3.       Immunosuppressants:  Sirolimus 2mg daily Goal 6-8  Tacrolimus 3mg BID Goal 4-6    Routine screenings:    Derm: appt made- end of Feb  Dental: DUE  Colonoscopy: due this year- referral sent  Breast/Prostate: normal PSA  Eye: UTD  Flu/Pneumonia: Needs Covid booster, pneumonia, flu, and Shingrix UTD.    Labs:       Additional Notes:         Labs, meds, echo reviewed with patient  Medication record reviewed and reconciled  Questions and concerns addressed  Pt verbalized an understanding of plan of care.     Patient Instructions  1. Please get your Covid booster in April.  2. Christelle placed a GI referral for a colonoscopy- they will call you to schedule.  3. Christelle will call with any remaining results.    Next transplant clinic appointment:  In July for 3rd annual  Next lab draw:   Coordinator will call with all pending results.     Please call transplant coordinator with any questions:    Christelle Pettit RN BSN   Post Heart Transplant Nurse Coordinator  [facility]  Questions: [phone]

## 2023-02-15 DIAGNOSIS — Z94.1 HEART REPLACED BY TRANSPLANT (H): Primary | ICD-10-CM

## 2023-02-15 LAB — EBV DNA # SPEC NAA+PROBE: NOT DETECTED COPIES/ML

## 2023-02-16 ENCOUNTER — OFFICE VISIT (OUTPATIENT)
Dept: OPHTHALMOLOGY | Facility: CLINIC | Age: 68
End: 2023-02-16
Attending: OPHTHALMOLOGY
Payer: COMMERCIAL

## 2023-02-16 DIAGNOSIS — Z48.810 AFTERCARE FOLLOWING SURGERY OF A SENSE ORGAN: Primary | ICD-10-CM

## 2023-02-16 PROCEDURE — 99024 POSTOP FOLLOW-UP VISIT: CPT | Mod: GC | Performed by: OPHTHALMOLOGY

## 2023-02-16 PROCEDURE — G0463 HOSPITAL OUTPT CLINIC VISIT: HCPCS

## 2023-02-16 ASSESSMENT — TONOMETRY
OD_IOP_MMHG: 15
IOP_METHOD: TONOPEN
OS_IOP_MMHG: 13

## 2023-02-16 ASSESSMENT — VISUAL ACUITY
METHOD: SNELLEN - LINEAR
OD_SC+: -1
OD_PH_SC: 20/40
OD_SC: 20/50
OS_SC: CF @2'
OD_PH_SC+: -1

## 2023-02-16 ASSESSMENT — EXTERNAL EXAM - LEFT EYE: OS_EXAM: NORMAL

## 2023-02-16 ASSESSMENT — EXTERNAL EXAM - RIGHT EYE: OD_EXAM: NORMAL

## 2023-02-16 ASSESSMENT — SLIT LAMP EXAM - LIDS: COMMENTS: NORMAL

## 2023-02-16 NOTE — PATIENT INSTRUCTIONS
Drops: (Right Eye)  Color  Frequency  - Prednisolone acetate  White  Three times a day x 1 week, then twice a day x 1 week and then once a day till finish    - Ofloxacin   Tan  Stop    - Ketorolac   [place]  Three times a day x 1 week, then twice a day x 1 week and then once a day till finish    - Latanoprost   Teal  At bedtime      Drops: (Left Eye)  Color  Frequency  - Prednisolone acetate  White  Every other day

## 2023-02-16 NOTE — NURSING NOTE
Chief Complaints and History of Present Illnesses   Patient presents with     Post Op (Ophthalmology) Right Eye     Cataract extraction with IOL in the right eye on 2/6/23     Chief Complaint(s) and History of Present Illness(es)     Post Op (Ophthalmology) Right Eye            Laterality: right eye    Course: gradually improving    Associated symptoms: Negative for eye pain, redness, flashes and floaters    Treatments tried: eye drops    Pain scale: 0/10    Comments: Cataract extraction with IOL in the right eye on 2/6/23          Comments    Patient states that his vision is improving in the right eye.  Patient denies having any eye discomfort.    Drops: (Right Eye)                    - Prednisolone acetate 4x/day  - Ofloxacin   4x/day  - Ketorolac   4x/day  - Latanoprost  at bedtime     Drops: (Left Eye)                      - Prednisolone acetate every other day    MAYE Araya 10:05 AM  February 16, 2023

## 2023-02-16 NOTE — MR AVS SNAPSHOT
After Visit Summary   1/11/2017    Lucian Oliveira    MRN: [id number]           Patient Information     Date Of Birth          1955        Visit Information        Provider Department      1/11/2017 7:30 AM Kai Valdez MD; [facility]        Today's Diagnoses     CHANTEL (obstructive sleep apnea)- severe (AHI 30)    -  1       Care Instructions      Your BMI is Body mass index is 31.78 kg/(m^2).  Weight management is a personal decision.  If you are interested in exploring weight loss strategies, the following discussion covers the approaches that may be successful. Body mass index (BMI) is one way to tell whether you are at a healthy weight, overweight, or obese. It measures your weight in relation to your height.  A BMI of 18.5 to 24.9 is in the healthy range. A person with a BMI of 25 to 29.9 is considered overweight, and someone with a BMI of 30 or greater is considered obese. More than two-thirds of American adults are considered overweight or obese.  Being overweight or obese increases the risk for further weight gain. Excess weight may lead to heart disease and diabetes.  Creating and following plans for healthy eating and physical activity may help you improve your health.  Weight control is part of healthy lifestyle and includes exercise, emotional health, and healthy eating habits. Careful eating habits lifelong are the mainstay of weight control. Though there are significant health benefits from weight loss, long-term weight loss with diet alone may be very difficult to achieve- studies show long-term success with dietary management in less than 10% of people. Attaining a healthy weight may be especially difficult to achieve in those with severe obesity. In some cases, medications, devices and surgical management might be considered.  What can you do?  If you are overweight or obese and are interested in methods for weight loss, you should  discuss this with your provider.     Consider reducing daily calorie intake by 500 calories.     Keep a food journal.     Avoiding skipping meals, consider cutting portions instead.    Diet combined with exercise helps maintain muscle while optimizing fat loss. Strength training is particularly important for building and maintaining muscle mass. Exercise helps reduce stress, increase energy, and improves fitness. Increasing exercise without diet control, however, may not burn enough calories to loose weight.       Start walking three days a week 10-20 minutes at a time    Work towards walking thirty minutes five days a week     Eventually, increase the speed of your walking for 1-2 minutes at time    In addition, we recommend that you review healthy lifestyles and methods for weight loss available through the National Institutes of Health patient information sites:  http://win.niddk.nih.gov/publications/index.htm    And look into health and wellness programs that may be available through your health insurance provider, employer, local community center, or sorin club.    Weight management plan: Patient was referred to their PCP to discuss a diet and exercise plan.            Follow-ups after your visit        Follow-up notes from your care team     Return in about 1 year (around 1/11/2018), or if symptoms worsen or fail to improve, for Routine Visit.      Your next 10 appointments already scheduled     Jan 12, 2017  7:30 AM   LAB with [facility]   [facility] ([facility]    [address] [address] [phone]           Patient must bring picture ID.  Patient should be prepared to give a urine specimen  Please do not eat 10-12 hours before your appointment if you are coming in fasting for labs on lipids, cholesterol, or glucose (sugar).  Pregnant women should follow their Care Team instructions. Water with medications is okay. Do not drink  coffee or other fluids.   If you have concerns about taking  your medications, please ask at office or if scheduling via [employer], send a message by clicking on Secure Messaging, Message Your Care Team.            Feb 07, 2017  7:40 AM   [facility] Visit with Anna Archuleta PA-C   [facility]    [address]  [place] [id number] [phone]           Bring a current list of meds and any records pertaining to this visit.  For Physicals, please bring immunization records and any forms needing to be filled out.  Please arrive 10 minutes early to complete paperwork.            Apr 25, 2017  7:45 AM   LAB with [facility]   [facility] ([facility]    [address] [place] [id number] [phone]           Patient must bring picture ID.  Patient should be prepared to give a urine specimen  Please do not eat 10-12 hours before your appointment if you are coming in fasting for labs on lipids, cholesterol, or glucose (sugar).  Pregnant women should follow their Care Team instructions. Water with medications is okay. Do not drink coffee or other fluids.   If you have concerns about taking  your medications, please ask at office or if scheduling via [employer], send a message by clicking on Secure Messaging, Message Your Care Team.            Apr 26, 2017 10:30 AM   Return Visit with Cassandra Mcgovern NP   [facility] ([facility])    [address]  [place] [address]   [phone]            Jun 05, 2017 10:00 AM   LAB with ACUTE CARE LAB U   [facility] [place] ([facility])    [address] [place] [address]              Patient must bring picture ID.  Patient should be prepared to give a urine specimen  Please do not eat 10-12 hours before your appointment if you are coming in fasting for labs on  lipids, cholesterol, or glucose (sugar).  Pregnant women should follow their Care Team instructions. Water with medications is okay. Do not drink coffee or other fluids.   If you have concerns about taking  your medications, please ask at office or if scheduling via [employer], send a message by clicking on Secure Messaging, Message Your Care Team.            Jun 05, 2017 10:30 AM   Card Cardpul Stress Tst Adult with UUEKGS   [facility] ([facility])    [address] [place] [address]               Jun 05, 2017  1:00 PM   Ech Coleman with [id number]   [facility], [place],  Echocardiography ([facility])    [address] [address]   [phone]           1.  Please bring or wear a comfortable two-piece outfit. 2.  Arrival time: -   [facility]:  arrive 75 minutes prior to examination time. -   [facility]:  arrive 90 minutes prior to examination time. -   [facility]:   arrive 15 minutes prior to examination time. 3.  Plan to have someone here to drive you home after the test. -   Someone should stay with you for 6 hours after your test. 4.  No food or drink: -   6 hours before the test 5.  If you take antacids or water pills (diuretics): Do not take them until after your test. You may take blood pressure medicine with a few sips of water. 6.  If you have diabetes: -   Morning slots preferred -   If you take insulin, call your diabetes care team. Ask if you should take a   dose the morning of your test. -   If you take diabetes medicine by mouth, don't take it on the morning of your test. Bring it with you to take after the test. (If you have questions, call your diabetes care team.) 7.  Bring a list of any medicines you are taking. 8.  Do not drive for 24 hours after the test. 9.  For any questions that cannot be answered, please contact the ordering physician            Jun 05, 2017  3:00 PM   (Arrive by 2:45  "PM)   Implanted Defibulator with Uc Cv Device 1   [facility] ([email or web address] [place])    [address] [facility] [place] [facility] [id number] [phone]            Jun 05, 2017  3:30 PM   (Arrive by 3:15 PM)   RETURN HEART FAILURE with Arvin Sheridan MD   [facility] ([email or web address] [place])    [address] [facility] [id number] [phone]              Who to contact     If you have questions or need follow up information about today's clinic visit or your schedule please contact [facility] directly at [phone].  Normal or non-critical lab and imaging results will be communicated to you by [employer], letter or phone within 4 business days after the clinic has received the results. If you do not hear from us within 7 days, please contact the clinic through [employer] or phone. If you have a critical or abnormal lab result, we will notify you by phone as soon as possible.  Submit refill requests through [employer] or call your pharmacy and they will forward the refill request to us. Please allow 3 business days for your refill to be completed.          Additional Information About Your Visit        [employer] Information     [employer] gives you secure access to your electronic health record. If you see a primary care provider, you can also send messages to your care team and make appointments. If you have questions, please call your primary care clinic.  If you do not have a primary care provider, please call [phone] and they will assist you.        Care EveryWhere ID     This is your Care EveryWhere ID. This could be used by other organizations to access your [facility] medical records  [id number]        Your Vitals Were     Pulse Height BMI (Body Mass Index) Pulse Oximetry          76 1.651 m (5' 5\") 31.78 kg/m2 97%         Blood Pressure from Last 3 Encounters:   01/11/17 127/82   01/05/17 106/68   01/04/17 111/67    " Weight from Last 3 Encounters:   01/11/17 86.637 kg (191 lb)   01/05/17 86.183 kg (190 lb)   01/04/17 86.354 kg (190 lb 6 oz)              Today, you had the following     No orders found for display         Today's Medication Changes          These changes are accurate as of: 1/11/17  8:08 AM.  If you have any questions, ask your nurse or doctor.               These medicines have changed or have updated prescriptions.        Dose/Directions    order for DME   This may have changed:  additional instructions        Auto CPAP 8-15 cmH20   Quantity:  1 Units   Refills:  0            Where to get your medicines      Information about where to get these medications is not yet available     ! Ask your nurse or doctor about these medications    - order for DME             Primary Care Provider Office Phone # Fax #    Anna Archuleta PA-C [phone]       [facility] [place] [address]  [place] [address]        Thank you!     Thank you for choosing [facility]  for your care. Our goal is always to provide you with excellent care. Hearing back from our patients is one way we can continue to improve our services. Please take a few minutes to complete the written survey that you may receive in the mail after your visit with us. Thank you!             Your Updated Medication List - Protect others around you: Learn how to safely use, store and throw away your medicines at www.disposemymeds.org.          This list is accurate as of: 1/11/17  8:08 AM.  Always use your most recent med list.                   Brand Name Dispense Instructions for use    albuterol 108 (90 BASE) MCG/ACT Inhaler    PROAIR HFA/PROVENTIL HFA/VENTOLIN HFA    1 Inhaler    Inhale 2 puffs into the lungs 3 times daily       aspirin 81 MG tablet     30 tablet    Take 1 tablet (81 mg) by mouth daily       atorvastatin 40 MG tablet    LIPITOR    90 tablet    Take 1 tablet (40 mg) by mouth daily       blood glucose  calibration solution     1 Bottle    Use to calibrate blood glucose monitor as directed.       blood glucose monitoring lancets     1 Box    Use to test blood sugars   times daily or as directed.       blood glucose monitoring meter device kit     1 kit    Use to test blood sugar 2 times daily, as directed.       blood glucose monitoring test strip    ONE TOUCH ULTRA    3 Month    Use to test blood sugars 2-3 times daily or as directed.       carvedilol 25 MG tablet    COREG    180 tablet    Take 1 tablet (25 mg) by mouth 2 times daily (with meals)       clopidogrel 75 MG tablet    PLAVIX    90 tablet    Take 1 tablet (75 mg) by mouth daily       furosemide 40 MG tablet    LASIX    180 tablet    Take 1 tablet (40 mg) by mouth daily Take an extra 40mg as needed if weight goes up 2# overnight, or more than 5# in 1 week.       glipiZIDE 5 MG tablet    GLUCOTROL    180 tablet    Take 1 tablet (5 mg) by mouth 2 times daily (before meals)       * insulin glargine 100 UNIT/ML injection    LANTUS SOLOSTAR    1 Month    Inject 23 Units Subcutaneous At Bedtime       * insulin glargine 100 UNIT/ML injection     8 pen    Inject 23 Units Subcutaneous At Bedtime       insulin pen needle 31G X 5 MM     100 each    Use one needle daily, as directed       isosorbide mononitrate 30 MG 24 hr tablet    IMDUR    45 tablet    Take 1 tablet (30 mg) by mouth daily       losartan 50 MG tablet    COZAAR    135 tablet    Take 1 tablet (50 mg) by mouth daily       nitroglycerin 0.4 MG sublingual tablet    NITROSTAT    10 tablet    Place 1 tablet (0.4 mg) under the tongue every 5 minutes as needed for chest pain If you are still having symptoms after 3 doses (15 minutes) call 911.       order for DME      Equipment being ordered: {Select DME item(s) to order:067976}       order for DME     1 Units    Auto CPAP 8-15 cmH20       spironolactone 25 MG tablet    ALDACTONE    45 tablet    Take 0.5 tablets (12.5 mg) by mouth daily       * Notice:   This list has 2 medication(s) that are the same as other medications prescribed for you. Read the directions carefully, and ask your doctor or other care provider to review them with you.       normal... Yes

## 2023-02-16 NOTE — PROGRESS NOTES
CC: POW1 s/p CEIOL OD     Interval: States his vision has improved in the right eye. Denies any pain/flashes/floaters/scotomas. Adherent with drops.    HPI: 67 year old man PMHx of CAD (s/p 3v CABG 2008), ischemic CM, now s/p orthotopic heart transplant 07/2020, CKD, HTN, HLD, obesity, CHANTEL and IDDM2 with history of PDR both eyes who presented to [facility] in 05/17/2019 with bilateral vitreous hemorrhages.      POH:  PPV/MP/retinectomy OS 12/21/20 Intraoperative, found to have longstanding funnel RD  NVI 06/02/2022  CEIOL OD 2/6/2023    Eye drops  Latanoprost at bedtime right eye  Pred Forte daily left eye  Pred, oflox, ketorolac TID OD      RETINAL IMAGING  Optical Coherence Tomography: 01/25/23 Right eye: interval resolution in central IR  Left eye: Irregular retina; ERM; nasal to fovea with large bullous edema, temporal with subretinal fibrosis / trace edema, Atrophic thinned retina IT macula. = stable     FA 01/25/23   right eye: PRP scars. Foci of NV superonasal.     Assessment and Plan    # Post-operative state, right eye   - POW1 s/p CEIOL OD 2/6/23. doing well. IOP WNL on latanoprost since POD1 for elevated IOP. VA improved to 20/40ph.   - return precautions discussed    Drops: (Right Eye)  Color  Frequency  - Prednisolone acetate  White  Three times a day x 1 week, then twice a day x 1 week and then once a day till finish  - Ketorolac   [place]  Three times a day x 1 week, then twice a day x 1 week and then once a day till finish  - Ofloxacin   Tan  Stop  - Latanoprost   Teal  At bedtime      Drops: (Left Eye)  Color  Frequency  - Prednisolone acetate  White  Every other day        # Proliferative Diabetic Retinopathy, right eye      # Vitreous hemorrhage, right eye -  Resolving (onset 4/2021)   - Responded to multiple Avastin, nearly resolved 08/04/2022   - R/B/A Fill-in PRP right eye 08/24/2022: #764 spots   -  09/14/2022 last Avastin right eye     # Proliferative Diabetic Retinopathy, left eye     -  New NVI, left eye 6/2/2022   - Gonio 6/2/2022 broad PAS superiorly ~3 clock hours, narrow PAS inferiorly <1 clock hour, no NVA   - Regressing 08/04/2022   - IOP 08/04/2022 15   - s/p PRP 6/5/2019   - s/p avastin last injection 06/02/2022   - Observe today, return to clinic 6 weeks possible Avastin left eye      # Funnel RD left eye   - progressed to funnel RD after loss to follow up given heart surgeries     - w retina folded on itself under SO   - guarded prognosis discussed   - Continue PredForte every other day left eye    # Ocuar HTN, both eyes    - Continue Latanoprost at bedtime both eyes    - IOP wnl     RTC: 3 weeks V,T,D,OCT macula OU    Vince Cat MD  Resident Physician - PGY3  Department of Ophthalmology   [facility]    ~~~~~~~~~~~~~~~~~~~~~~~~~~~~~~~~~~   Complete documentation of historical and exam elements from today's encounter can be found in the full encounter summary report (not reduplicated in this progress note).  I personally obtained the chief complaint(s) and history of present illness.  I confirmed and edited as necessary the review of systems, past medical/surgical history, family history, social history, and examination findings as documented by others; and I examined the patient myself.  I personally reviewed the relevant tests, images, and reports as documented above.  I formulated and edited as necessary the assessment and plan and discussed the findings and management plan with the patient and family    Triny Bunch MD  Professor of Ophthalmology  Vitreo-Retinal surgeon   Department of Ophthalmology and Visual Neurosciences   [facility]  Phone: [phone]   Fax: [phone]

## 2023-02-17 ENCOUNTER — TELEPHONE (OUTPATIENT)
Dept: TRANSPLANT | Facility: CLINIC | Age: 68
End: 2023-02-17
Payer: COMMERCIAL

## 2023-02-17 LAB
ALLOMAP SCORE (EXTERNAL): 36 (ref 0–40)
NEGATIVE PREDICTIVE VALUE PERCENT (EXTERNAL): 98.7 %
POSITIVE PREDICTIVE VALUE PERCENT (EXTERNAL): 5.4 %

## 2023-02-17 NOTE — TELEPHONE ENCOUNTER
Pt called with remaining results from this week's visit using a .  Echo normal- EF 55-60%.  Drug levels both at goal- no dose changes needed.  Renal consult placed- they will call pt to schedule.  Pt states understanding plan and instructions.

## 2023-02-22 DIAGNOSIS — E11.3513 TYPE 2 DIABETES MELLITUS WITH PROLIFERATIVE RETINOPATHY OF BOTH EYES AND MACULAR EDEMA, UNSPECIFIED WHETHER LONG TERM INSULIN USE (H): Primary | ICD-10-CM

## 2023-02-23 ENCOUNTER — APPOINTMENT (OUTPATIENT)
Dept: INTERPRETER SERVICES | Facility: CLINIC | Age: 68
End: 2023-02-23
Payer: COMMERCIAL

## 2023-02-24 ENCOUNTER — TELEPHONE (OUTPATIENT)
Dept: TRANSPLANT | Facility: CLINIC | Age: 68
End: 2023-02-24
Payer: COMMERCIAL

## 2023-02-24 DIAGNOSIS — Z94.1 HEART TRANSPLANT, ORTHOTOPIC, STATUS (H): ICD-10-CM

## 2023-02-24 RX ORDER — ROSUVASTATIN CALCIUM 20 MG/1
20 TABLET, COATED ORAL DAILY
Qty: 90 TABLET | Refills: 3 | Status: SHIPPED | OUTPATIENT
Start: 2023-02-24 | End: 2024-03-08

## 2023-02-24 NOTE — TELEPHONE ENCOUNTER
Pt with elevated lipids in clinic.  Reviewed with Dr. Sheridan.  Pt to increase his Crestor to 20mg daily.  Pt called with the above instructions using .  Pt states understanding instructions.  New Rx also sent to [facility].

## 2023-02-27 ENCOUNTER — TELEPHONE (OUTPATIENT)
Dept: GASTROENTEROLOGY | Facility: CLINIC | Age: 68
End: 2023-02-27
Payer: COMMERCIAL

## 2023-02-27 NOTE — TELEPHONE ENCOUNTER
Screening Questions  BLUE  KIND OF PREP RED  LOCATION [review exclusion criteria] GREEN  SEDATION TYPE    Y  Are you active on mychart?   Arlin Guajardo, ROSINA   Ordering/Referring Provider?    [facility]  What type of coverage do you have?  N Have you had a positive covid test in the last 14 days?    29.0  1. BMI  [BMI 40+ - review exclusion criteria - MAC + [facility]]            *NEED PAC APPT AT [facility] + MAC (ONLY)*     SELF   2. Are you able to give consent for your medical care? [IF NO,RN REVIEW]          N  3. Are you taking any prescription pain medications on a routine schedule   (ex narcotics: tramadol, oxycodone, roxicodone, oxycontin,  and percocet)?    [RN Review]             N  3a. EXTENDED PREP What kind of prescription?     N 4. Do you have any chemical dependencies such as alcohol, street drugs, or methadone?    **If yes 3- 5 , please schedule with MAC sedation.**          IF YES TO ANY 6 - 10 - HOSPITAL SETTING ONLY.     N 6.   Do you need assistance transferring?     Y - HEART TX  7.   Have you had a heart or lung transplant?    N 8.   Are you currently on dialysis?   N 9.   Do you use daily home oxygen?   N 10. Do you take nitroglycerin?   10a. N If yes, how often?     11. [FEMALES]   Are you currently pregnant?     11a.  If yes, how many weeks? [ Greater than 12 weeks, OR NEEDED]    N 12. [review exclusion criteria]  Do you have any implantable devices in your body (pacemaker, defib, LVAD)?            *NEED PAC APPT AT [facility]*     N 13. Do you have Pulmonary Hypertension?             *NEED PAC APPT AT [facility]*     N 14. In the past 6 months, have you had any heart related issues including cardiomyopathy or heart attack?     N 14a. If yes, did it require cardiac stenting if so when?     N 15. Have you had a stroke or Transient ischemic attack (TIA - aka  mini stroke ) within 6 months?      N 16. Do you have mod to severe Obstructive Sleep Apnea?  [Hospital only]    N 17. Do you have SEVERE AND UNCONTROLLED asthma?      "         *NEED PAC APPT AT U*     Y - BLOOD THINNER  18. Are you currently taking any blood thinners?     18a. If yes, inform patient to \"follow up w/ ordering provider for bridging instructions.\"    N 19. Do you take the medication Phentermine?    19a. If yes, \"Hold for 7 days before procedure.  Please consult your prescribing provider if you have questions about holding this medication.\"     N  20. Do you have chronic kidney disease?      Y - DIABETIC   21. Do you have a diagnosis of diabetes?     N  22. On a regular basis do you go 3-5 days between bowel movements?     23. Preferred LOCAL Pharmacy for Pre Prescription    [ LIST ONLY ONE PHARMACY]          [facility] #[id number] - [address] AT [facility] [address]          - [facility] REMINDERS -    Informed patient they will need an adult          Cannot take any type of public or medical transportation alone    Conscious Sedation- Needs  for 6 hours after the procedure        MAC/General-Needs  for 24 hours after procedure    Pre-Procedure Covid test to be completed         [[facility] [facility]/HOME Testing Required] + ADULT to ACCOMPANY     Confirmed Nurse will call to complete assessment       - SCHEDULING DETAILS -      Hospital Setting Required? If yes, what is the exclusion?:      Y - HEART TRANSPLANT PT       Additional comments:  ORLANDO ASHER   Surgeon   05/04/2023  Date of Procedure  MODERATE  Sedation Type     N  PAC / Pre-op Required   Location  [facility]        Type of Procedure Scheduled  Lower Endoscopy [Colonoscopy]      Which Colonoscopy Prep was Sent?     STANDARD GOLYTELY-If you answer yes to questions #8, #20, #21          "

## 2023-03-03 DIAGNOSIS — Z94.1 HEART TRANSPLANT, ORTHOTOPIC, STATUS (H): ICD-10-CM

## 2023-03-08 RX ORDER — ROSUVASTATIN CALCIUM 10 MG/1
TABLET, COATED ORAL
Qty: 90 TABLET | Refills: 3 | Status: SHIPPED | OUTPATIENT
Start: 2023-03-08 | End: 2023-07-17

## 2023-03-09 ENCOUNTER — OFFICE VISIT (OUTPATIENT)
Dept: OPHTHALMOLOGY | Facility: CLINIC | Age: 68
End: 2023-03-09
Attending: OPHTHALMOLOGY
Payer: COMMERCIAL

## 2023-03-09 DIAGNOSIS — Z48.810 AFTERCARE FOLLOWING SURGERY OF A SENSE ORGAN: ICD-10-CM

## 2023-03-09 DIAGNOSIS — E11.3513 TYPE 2 DIABETES MELLITUS WITH PROLIFERATIVE RETINOPATHY OF BOTH EYES AND MACULAR EDEMA, UNSPECIFIED WHETHER LONG TERM INSULIN USE (H): ICD-10-CM

## 2023-03-09 PROCEDURE — G0463 HOSPITAL OUTPT CLINIC VISIT: HCPCS | Mod: 25

## 2023-03-09 PROCEDURE — 92134 CPTRZ OPH DX IMG PST SGM RTA: CPT | Performed by: OPHTHALMOLOGY

## 2023-03-09 PROCEDURE — 99024 POSTOP FOLLOW-UP VISIT: CPT | Mod: GC | Performed by: OPHTHALMOLOGY

## 2023-03-09 RX ORDER — KETOROLAC TROMETHAMINE 5 MG/ML
1 SOLUTION OPHTHALMIC 2 TIMES DAILY
Qty: 10 ML | Refills: 3 | Status: SHIPPED | OUTPATIENT
Start: 2023-03-09

## 2023-03-09 RX ORDER — PREDNISOLONE ACETATE 10 MG/ML
1 SUSPENSION/ DROPS OPHTHALMIC 2 TIMES DAILY
Qty: 10 ML | Refills: 3 | Status: SHIPPED | OUTPATIENT
Start: 2023-03-09 | End: 2024-07-22

## 2023-03-09 ASSESSMENT — TONOMETRY
IOP_METHOD: TONOPEN
OS_IOP_MMHG: 14
OD_IOP_MMHG: 17

## 2023-03-09 ASSESSMENT — CONF VISUAL FIELD
OS_SUPERIOR_NASAL_RESTRICTION: 1
OS_INFERIOR_TEMPORAL_RESTRICTION: 3
OS_INFERIOR_NASAL_RESTRICTION: 3
OS_SUPERIOR_TEMPORAL_RESTRICTION: 3
METHOD: COUNTING FINGERS

## 2023-03-09 ASSESSMENT — VISUAL ACUITY
OD_SC: 20/30
OS_SC: CF @ 2 FT
METHOD: SNELLEN - LINEAR
OD_SC+: -3

## 2023-03-09 ASSESSMENT — SLIT LAMP EXAM - LIDS
COMMENTS: NORMAL
COMMENTS: NORMAL

## 2023-03-09 ASSESSMENT — CUP TO DISC RATIO: OD_RATIO: 0.25

## 2023-03-09 ASSESSMENT — EXTERNAL EXAM - RIGHT EYE: OD_EXAM: NORMAL

## 2023-03-09 ASSESSMENT — EXTERNAL EXAM - LEFT EYE: OS_EXAM: NORMAL

## 2023-03-09 NOTE — PATIENT INSTRUCTIONS
Pavel derecho:  Use prednisolone (tapa bienvenido o rosada) dos veces al najma  Use ketorolac (tapa casimiro) dos veces al najma     Pavel luis m:   Use prednisolone (tapa bienvenido o rosada) cada dos bedoya

## 2023-03-09 NOTE — PROGRESS NOTES
CC: POM1 s/p CEIOL OD     Interval: States his vision has improved in the right eye. Denies any pain/flashes/floaters/scotomas. States he is using two different postop drops in the right eye twice per day: one white top and one tan top. OS uses pink top drop every other day. Only sometimes uses teal top drop at bedtime OU.     HPI: 67 year old man PMHx of CAD (s/p 3v CABG 2008), ischemic CM, now s/p orthotopic heart transplant 07/2020, CKD, HTN, HLD, obesity, CHANTEL and IDDM2 with history of PDR both eyes who presented to [facility] in 05/17/2019 with bilateral vitreous hemorrhages.      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 stable     FA 01/25/23   right eye: PRP scars. Foci of NV superonasal.     Assessment and Plan    # Post-operative state, right eye   - 03/09/23: POM1 s/p CEIOL OD 2/6/23. doing well. IOP WNL intermittently adherent to latanoprost since POD1 for elevated IOP. VA improved to 20/30sc.   Optical Coherence Tomography 03/09/23 with increased but mild IRF centrally.   Will taper meds slowly    - Continue ketorolac BID right eye    - Continue prednisolone BID OD   - Discontinue latanoprost   - return precautions discussed    # Proliferative Diabetic Retinopathy, right eye      # Vitreous hemorrhage, right eye -  Resolving (onset 4/2021)   - Responded to multiple Avastin, nearly resolved 08/04/2022   - R/B/A Fill-in PRP right eye 08/24/2022: #764 spots   -  09/14/2022 last Avastin right eye     # Proliferative Diabetic  Retinopathy, left eye     - New NVI, left eye 6/2/2022   - Gonio 6/2/2022 broad PAS superiorly ~3 clock hours, narrow PAS inferiorly <1 clock hour, no NVA   - Regressing 08/04/2022   - IOP 08/04/2022 15   - s/p PRP 6/5/2019   - s/p avastin last injection 06/02/2022   - 03/09/23 VA stable CF2' - continue to observe given chronic RD    # Funnel RD left eye   - progressed to funnel RD after loss to follow up given heart surgeries     - w retina folded on itself under SO   - guarded prognosis discussed   - Continue PredForte every other day left eye    # Ocuar HTN, both eyes    - 03/09/23 IOP wnl with poor drop adherence.    - Discontinue latanoprost to simplify drop regimen and will restart if IOP rises again    RTC: 1 month V,T,D, OCT macula OU, optos OU, MRX - may come back in 5/2023 because will be traveling to [place] to stay with daughter until then. Will follow with ophthalmologist for one visit in [place] in 4 weeks and then back here when they return.     Vince Cat MD  Resident Physician - PGY3  Department of Ophthalmology   [facility]    ~~~~~~~~~~~~~~~~~~~~~~~~~~~~~~~~~~   Complete documentation of historical and exam elements from today's encounter can be found in the full encounter summary report (not reduplicated in this progress note).  I personally obtained the chief complaint(s) and history of present illness.  I confirmed and edited as necessary the review of systems, past medical/surgical history, family history, social history, and examination findings as documented by others; and I examined the patient myself.  I personally reviewed the relevant tests, images, and reports as documented above.  I personally reviewed the ophthalmic test(s) associated with this encounter, agree with the interpretation(s) as documented by the resident/fellow, and have edited the corresponding report(s) as necessary.   I formulated and edited as necessary the assessment and plan and discussed the  findings and management plan with the patient and family    Triny Bunch MD  Professor of Ophthalmology  Vitreo-Retinal surgeon   Department of Ophthalmology and Visual Neurosciences   [facility]  Phone: [phone]   Fax: [phone]

## 2023-03-09 NOTE — NURSING NOTE
Chief Complaints and History of Present Illnesses   Patient presents with     Follow Up     Chief Complaint(s) and History of Present Illness(es)     Follow Up           Comments    Patient states that his vision has improved since he was here last. No pain and irritation. No flashes of lights. No floaters.      Ocular Meds:PredForte every other day left eye  Latanoprost at bedtime both eyes     Ari VILLAVICENCIO, March 9, 2023 9:39 AM

## 2023-03-14 ENCOUNTER — TELEPHONE (OUTPATIENT)
Dept: GASTROENTEROLOGY | Facility: CLINIC | Age: 68
End: 2023-03-14
Payer: COMMERCIAL

## 2023-03-14 NOTE — TELEPHONE ENCOUNTER
Caller: Lucian Oliveira,    Reason for Reschedule/Cancellation (please be detailed, any staff messages or encounters to note?): PT REQUEST       Prior to reschedule please review:    Ordering Provider:Arlin Guajardo NP     Sedation per order:CS     Does patient have any ASC Exclusions, please identify?: N      Notes on Cancelled Procedure:    Procedure:Lower Endoscopy [Colonoscopy]     Date: 05/04/2023    Location:[facility]; [address] [facility], [address] [place] [address]    Surgeon: LB        Rescheduled: No    Procedure: Lower Endoscopy [Colonoscopy]     Date: 05/12/2023    Location:[facility]; [address] [facility], [address] [place] [address]    Surgeon: HUSAM    Sedation Level Scheduled  CS,  Reason for Sedation Level ORDER    Prep/Instructions updated and sent:

## 2023-03-26 DIAGNOSIS — Z79.4 TYPE 2 DIABETES MELLITUS WITH HYPERGLYCEMIA, WITH LONG-TERM CURRENT USE OF INSULIN (H): ICD-10-CM

## 2023-03-26 DIAGNOSIS — E11.65 TYPE 2 DIABETES MELLITUS WITH HYPERGLYCEMIA, WITH LONG-TERM CURRENT USE OF INSULIN (H): ICD-10-CM

## 2023-03-27 DIAGNOSIS — E11.65 TYPE 2 DIABETES MELLITUS WITH HYPERGLYCEMIA, WITH LONG-TERM CURRENT USE OF INSULIN (H): ICD-10-CM

## 2023-03-27 DIAGNOSIS — Z79.4 TYPE 2 DIABETES MELLITUS WITH HYPERGLYCEMIA, WITH LONG-TERM CURRENT USE OF INSULIN (H): ICD-10-CM

## 2023-03-27 RX ORDER — INSULIN LISPRO 100 [IU]/ML
INJECTION, SOLUTION INTRAVENOUS; SUBCUTANEOUS
Qty: 15 ML | Refills: 1 | OUTPATIENT
Start: 2023-03-27

## 2023-03-27 RX ORDER — INSULIN LISPRO 100 [IU]/ML
INJECTION, SOLUTION INTRAVENOUS; SUBCUTANEOUS
Qty: 15 ML | Refills: 1 | Status: SHIPPED | OUTPATIENT
Start: 2023-03-27 | End: 2023-10-12

## 2023-03-27 NOTE — TELEPHONE ENCOUNTER
HUMALOG KWIKPEN 100 UNIT/ML soln        Last Written Prescription Date:  01/20/23  Last Fill Quantity: 15mL,   # refills: 1  Last Office Visit : 01/17/23  Future Office visit:  05/23/23    Routing refill request to provider for review/approval because:  Insulin - refilled per clinic

## 2023-03-27 NOTE — TELEPHONE ENCOUNTER
Name from pharmacy: HUMALOG 100 U/ML KWIK PEN INJ 3ML         Will file in chart as: HUMALOG KWIKPEN 100 UNIT/ML soln     Possible duplicate: Hover to review recent actions on this medication                        Last ordered: Today by Marisabel Prieto PA-C Last refill: 3/10/2023    Rx #: [id number]    Short Acting Insulin Protocol Passed         HUMALOG KWIKPEN 100 UNIT/ML soln        Last Written Prescription Date:  03/27/23  Last Fill Quantity: 15mL,   # refills: 1  Last Office Visit : 01/17/23  Future Office visit:  05/23/23    Routing refill request to provider for review/approval because:  Insulin - refilled per clinic

## 2023-04-14 DIAGNOSIS — Z94.1 HEART REPLACED BY TRANSPLANT (H): Primary | ICD-10-CM

## 2023-04-20 DIAGNOSIS — E11.3513 TYPE 2 DIABETES MELLITUS WITH PROLIFERATIVE RETINOPATHY OF BOTH EYES AND MACULAR EDEMA, UNSPECIFIED WHETHER LONG TERM INSULIN USE (H): Primary | ICD-10-CM

## 2023-04-20 DIAGNOSIS — Z94.1 HEART REPLACED BY TRANSPLANT (H): Primary | ICD-10-CM

## 2023-04-24 ENCOUNTER — DOCUMENTATION ONLY (OUTPATIENT)
Dept: LAB | Facility: CLINIC | Age: 68
End: 2023-04-24
Payer: COMMERCIAL

## 2023-04-24 DIAGNOSIS — N18.4 CKD (CHRONIC KIDNEY DISEASE) STAGE 4, GFR 15-29 ML/MIN (H): Primary | ICD-10-CM

## 2023-04-24 NOTE — PROGRESS NOTES
Lucian Oliveira has an upcoming lab appointment: please place orders in epic thank you    Future Appointments   Date Time Provider Department Center   5/3/2023  8:30 AM [facility]   5/8/2023  2:00 PM [facility]     New nephrology patient lab orders placed for upcoming visit with Dr Shelton.  AURORA Hagen

## 2023-04-26 ENCOUNTER — APPOINTMENT (OUTPATIENT)
Dept: INTERPRETER SERVICES | Facility: CLINIC | Age: 68
End: 2023-04-26
Payer: COMMERCIAL

## 2023-04-26 ENCOUNTER — TELEPHONE (OUTPATIENT)
Dept: GASTROENTEROLOGY | Facility: CLINIC | Age: 68
End: 2023-04-26

## 2023-04-26 DIAGNOSIS — Z94.1 HEART TRANSPLANT, ORTHOTOPIC, STATUS (H): Primary | ICD-10-CM

## 2023-04-26 RX ORDER — BISACODYL 5 MG/1
TABLET, DELAYED RELEASE ORAL
Qty: 4 TABLET | Refills: 0 | Status: SHIPPED | OUTPATIENT
Start: 2023-04-26 | End: 2023-05-08

## 2023-04-26 NOTE — TELEPHONE ENCOUNTER
Attempted to contact patient regarding upcoming Colonoscopy  procedure on 5/12/23 for pre assessment questions. Wife answered, patient is out. Requesting call back 4/28/23 between 0900 and 1000.     Left message to return call to [phone] #4    Discuss Covid policy and designated  policy.    Pre op exam? N/A    Arrival time: 0800. Procedure time: 0900    Facility location: [facility]; [address] [place] [address], [address] Floor, [place] [address]    Sedation type: Conscious sedation     Anticoagulants: Patient answered yes during scheduling questions. None noted in chart. Verify if patient takes blood thinners    Electronic implanted devices? Yes: ICD    Diabetic? Yes - Patient to hold oral diabetic medications day of procedure  No oral diabetes medications noted in chart.     Indication for procedure: heart transplant orthotopic status    Bowel prep recommendation: Standard Golytely      Prep instructions sent via [employer]. Bowel prep script sent to    [facility] #[id number] - [place], [place] - [address] AT [facility]    Marjan Sanderson RN  Endoscopy Procedure Pre Assessment RN

## 2023-04-27 RX ORDER — INSULIN HUMAN 100 [IU]/ML
INJECTION, SUSPENSION SUBCUTANEOUS
Qty: 45 ML | Refills: 3 | Status: SHIPPED | OUTPATIENT
Start: 2023-04-27 | End: 2023-06-22

## 2023-05-03 ENCOUNTER — LAB (OUTPATIENT)
Dept: LAB | Facility: CLINIC | Age: 68
End: 2023-05-03
Attending: INTERNAL MEDICINE
Payer: COMMERCIAL

## 2023-05-03 ENCOUNTER — TELEPHONE (OUTPATIENT)
Dept: ENDOCRINOLOGY | Facility: CLINIC | Age: 68
End: 2023-05-03

## 2023-05-03 DIAGNOSIS — N18.4 CKD (CHRONIC KIDNEY DISEASE) STAGE 4, GFR 15-29 ML/MIN (H): ICD-10-CM

## 2023-05-03 LAB
ALBUMIN SERPL BCG-MCNC: 3.8 G/DL (ref 3.5–5.2)
ANION GAP SERPL CALCULATED.3IONS-SCNC: 11 MMOL/L (ref 7–15)
BUN SERPL-MCNC: 31 MG/DL (ref 8–23)
CALCIUM SERPL-MCNC: 9.4 MG/DL (ref 8.8–10.2)
CHLORIDE SERPL-SCNC: 105 MMOL/L (ref 98–107)
CREAT SERPL-MCNC: 2.23 MG/DL (ref 0.67–1.17)
DEPRECATED HCO3 PLAS-SCNC: 25 MMOL/L (ref 22–29)
ERYTHROCYTE [DISTWIDTH] IN BLOOD BY AUTOMATED COUNT: 16 % (ref 10–15)
GFR SERPL CREATININE-BSD FRML MDRD: 31 ML/MIN/1.73M2
GLUCOSE SERPL-MCNC: 131 MG/DL (ref 70–99)
HCT VFR BLD AUTO: 33.8 % (ref 40–53)
HGB BLD-MCNC: 11 G/DL (ref 13.3–17.7)
MCH RBC QN AUTO: 29.4 PG (ref 26.5–33)
MCHC RBC AUTO-ENTMCNC: 32.5 G/DL (ref 31.5–36.5)
MCV RBC AUTO: 90 FL (ref 78–100)
PHOSPHATE SERPL-MCNC: 3.7 MG/DL (ref 2.5–4.5)
PLATELET # BLD AUTO: 192 10E3/UL (ref 150–450)
POTASSIUM SERPL-SCNC: 4.8 MMOL/L (ref 3.4–5.3)
PTH-INTACT SERPL-MCNC: 40 PG/ML (ref 15–65)
RBC # BLD AUTO: 3.74 10E6/UL (ref 4.4–5.9)
SODIUM SERPL-SCNC: 141 MMOL/L (ref 136–145)
WBC # BLD AUTO: 5.7 10E3/UL (ref 4–11)

## 2023-05-03 PROCEDURE — 80069 RENAL FUNCTION PANEL: CPT

## 2023-05-03 PROCEDURE — 84100 ASSAY OF PHOSPHORUS: CPT | Performed by: INTERNAL MEDICINE

## 2023-05-03 PROCEDURE — 36415 COLL VENOUS BLD VENIPUNCTURE: CPT

## 2023-05-03 PROCEDURE — 85027 COMPLETE CBC AUTOMATED: CPT

## 2023-05-03 PROCEDURE — 82306 VITAMIN D 25 HYDROXY: CPT

## 2023-05-03 PROCEDURE — 82040 ASSAY OF SERUM ALBUMIN: CPT

## 2023-05-03 PROCEDURE — 83970 ASSAY OF PARATHORMONE: CPT

## 2023-05-03 NOTE — TELEPHONE ENCOUNTER
Prior Authorization Retail Medication Request    Medication/Dose: Turlicity 4.5MG/0.5ML pen injectors      ICD code (if different than what is on RX):  E11.65    Previously Tried and Failed:    Rationale:  Type 2 diabetes mellitus with hyperglycemia, with long-term current use of insulin     Insurance Name:  [employer] MEDIC[employer] AD  Insurance ID:  [id number]       Pharmacy Information (if different than what is on RX)  Name:    Phone:

## 2023-05-04 ENCOUNTER — OFFICE VISIT (OUTPATIENT)
Dept: OPHTHALMOLOGY | Facility: CLINIC | Age: 68
End: 2023-05-04
Attending: OPHTHALMOLOGY
Payer: COMMERCIAL

## 2023-05-04 DIAGNOSIS — E11.3513 TYPE 2 DIABETES MELLITUS WITH PROLIFERATIVE RETINOPATHY OF BOTH EYES AND MACULAR EDEMA, UNSPECIFIED WHETHER LONG TERM INSULIN USE (H): Primary | ICD-10-CM

## 2023-05-04 LAB — DEPRECATED CALCIDIOL+CALCIFEROL SERPL-MC: 38 UG/L (ref 20–75)

## 2023-05-04 PROCEDURE — 92015 DETERMINE REFRACTIVE STATE: CPT

## 2023-05-04 PROCEDURE — G0463 HOSPITAL OUTPT CLINIC VISIT: HCPCS | Mod: 25 | Performed by: OPHTHALMOLOGY

## 2023-05-04 PROCEDURE — 99024 POSTOP FOLLOW-UP VISIT: CPT | Performed by: OPHTHALMOLOGY

## 2023-05-04 PROCEDURE — 92134 CPTRZ OPH DX IMG PST SGM RTA: CPT | Performed by: OPHTHALMOLOGY

## 2023-05-04 PROCEDURE — 99207 FUNDUS PHOTOS OU (BOTH EYES): CPT | Mod: 26 | Performed by: OPHTHALMOLOGY

## 2023-05-04 PROCEDURE — 92250 FUNDUS PHOTOGRAPHY W/I&R: CPT | Performed by: OPHTHALMOLOGY

## 2023-05-04 ASSESSMENT — REFRACTION_MANIFEST
OD_AXIS: 017
OD_ADD: +2.50
OS_SPHERE: BALANCE
OD_CYLINDER: +1.00
OD_SPHERE: -0.50

## 2023-05-04 ASSESSMENT — SLIT LAMP EXAM - LIDS
COMMENTS: NORMAL
COMMENTS: NORMAL

## 2023-05-04 ASSESSMENT — TONOMETRY
OD_IOP_MMHG: 13
OS_IOP_MMHG: 12
IOP_METHOD: TONOPEN

## 2023-05-04 ASSESSMENT — VISUAL ACUITY
OD_SC: 20/40
OD_PH_SC+: -
OS_SC: CF@2'
OD_PH_SC: 20/30
METHOD: SNELLEN - LINEAR
OD_SC+: -1

## 2023-05-04 ASSESSMENT — EXTERNAL EXAM - LEFT EYE: OS_EXAM: NORMAL

## 2023-05-04 ASSESSMENT — EXTERNAL EXAM - RIGHT EYE: OD_EXAM: NORMAL

## 2023-05-04 ASSESSMENT — CUP TO DISC RATIO: OD_RATIO: 0.25

## 2023-05-04 NOTE — PATIENT INSTRUCTIONS
Right eye:    - Continue ketorolac once a day   right eye x 1 month and stop   - Continue prednisolone once a day right eye x 1 month and stop  Left eye    - Continue PredForte every other day left eye    Follow up in 6 weeks with Optical Coherence Tomography

## 2023-05-04 NOTE — PROGRESS NOTES
CC: follow up proliferative diabetic retinopathy  s/p CEIOL OD     Interval: States his vision has improved in the right eye. Denies any pain/flashes/floaters/scotomas.     HPI: 67 year old man PMHx of CAD (s/p 3v CABG 2008), ischemic CM, now s/p orthotopic heart transplant 07/2020, CKD, HTN, HLD, obesity, CHANTEL and IDDM2 with history of PDR both eyes who presented to [facility] in 05/17/2019 with bilateral vitreous hemorrhages.      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 stable     FA 01/25/23   right eye: PRP scars. Foci of NV superonasal.     optos consistent with exam     Assessment and Plan    # s/p CEIOL OD 2/6/23. doing well.   IOP WNL    VA improved to 20/30sc.   Optical Coherence Tomography 05/04/23 with resolved cystoid macular edema  cont tapering meds slowly    - Continue ketorolac once a day   right eye    - Continue prednisolone once a day OD   - Discontinue latanoprost   - return precautions discussed    # Proliferative Diabetic Retinopathy, right eye      # Vitreous hemorrhage, right eye -  Resolving (onset 4/2021)   - Responded to multiple Avastin, nearly resolved 08/04/2022   - R/B/A Fill-in PRP right eye 08/24/2022: #764 spots   -  09/14/2022 last Avastin right eye     # Proliferative Diabetic Retinopathy, left eye     - New NVI, left eye 6/2/2022   - Gonio 6/2/2022 broad PAS superiorly ~3 clock hours, narrow PAS inferiorly <1 clock hour, no NVA   - Regressing 08/04/2022   - IOP 08/04/2022 15   - s/p PRP 6/5/2019   - s/p avastin last  injection 06/02/2022   - 03/09/23 VA stable CF2' - continue to observe given chronic RD  # Funnel RD left eye   - progressed to funnel RD after loss to follow up given heart surgeries     - w retina folded on itself under SO   - guarded prognosis discussed   - Continue PredForte every other day left eye    # Ocuar HTN, both eyes    - 03/09/23 IOP wnl with poor drop adherence.    - Discontinue latanoprost to simplify drop regimen and will restart if IOP rises again    PLAN:   Prescription given 05/04/23   Follow up in 6 weeks with Optical Coherence Tomography   Right eye:    - Continue ketorolac once a day   right eye x 1 month and stop   - Continue prednisolone once a day right eye x 1 month and stop  Left eye    - Continue PredForte every other day left eye  Retina detachment precautions were discussed with the patient (presence or increased in flashes, floaters or a curtain in the visual field) and was asked to return if any of the those occur   ~~~~~~~~~~~~~~~~~~~~~~~~~~~~~~~~~~   Complete documentation of historical and exam elements from today's encounter can be found in the full encounter summary report (not reduplicated in this progress note).  I personally obtained the chief complaint(s) and history of present illness.  I confirmed and edited as necessary the review of systems, past medical/surgical history, family history, social history, and examination findings as documented by others; and I examined the patient myself.  I personally reviewed the relevant tests, images, and reports as documented above.  I formulated and edited as necessary the assessment and plan and discussed the findings and management plan with the patient and family    Triny Bunch MD  Professor of Ophthalmology  Vitreo-Retinal surgeon   Department of Ophthalmology and Visual Neurosciences   [facility]  Phone: [phone]   Fax: [phone]

## 2023-05-05 NOTE — TELEPHONE ENCOUNTER
Pre assessment questions completed for upcoming Colonoscopy  procedure scheduled on 5/12/23    COVID policy reviewed.     Pre-op exam? N/A    Reviewed procedural arrival time 0800, procedure time 0900 and facility location [facility]; [address] [facility], [address], [place] [address]    Designated  policy reviewed. Instructed to have someone stay 6 hours post procedure.     NSAIDs? No    Anticoagulation/blood thinners? No     Patient does take 81mg aspirin, that was the blood thinner he was referring to during the scheduling encounter.    Electronic implanted devices? No    Diabetic? Yes - Patient to hold oral diabetic medications day of procedure    Procedure indication: heart transplant orthotopic status    Bowel prep recommendation: Standard Golytely     Reviewed procedure prep instructions.     Prep instructions sent via SHIFT.  Bowel prep script sent to    [facility] #[id number] [place], [place] - [address] AT     Patient verbalized understanding and had no questions or concerns at this time.    Marjan Sanderson RN  Endoscopy Procedure Pre Assessment RN

## 2023-05-08 ENCOUNTER — OFFICE VISIT (OUTPATIENT)
Dept: NEPHROLOGY | Facility: CLINIC | Age: 68
End: 2023-05-08
Attending: INTERNAL MEDICINE
Payer: COMMERCIAL

## 2023-05-08 VITALS — HEART RATE: 100 BPM | DIASTOLIC BLOOD PRESSURE: 73 MMHG | OXYGEN SATURATION: 98 % | SYSTOLIC BLOOD PRESSURE: 130 MMHG

## 2023-05-08 DIAGNOSIS — Z94.1 HEART REPLACED BY TRANSPLANT (H): ICD-10-CM

## 2023-05-08 DIAGNOSIS — N18.32 STAGE 3B CHRONIC KIDNEY DISEASE (CKD) (H): Primary | ICD-10-CM

## 2023-05-08 PROCEDURE — 99205 OFFICE O/P NEW HI 60 MIN: CPT | Performed by: INTERNAL MEDICINE

## 2023-05-08 NOTE — PROGRESS NOTES
Assessment and Plan:  67 year old male with history of CAD s/p CABG with ICM s/p OHT in 2020, CKD with baseline Scr 1.2-1.7 before transplant, up to 2.2 since Fall 2022, diabetes for 20 years, who presents for evaluation.  His Scr is near 2.2, up from 1.7 in Fall 2022.    # CKD:  He has long standing CKD, baseline 1.2-1.7 before transplant, and was relatively stable, on combination of tacrolimus and sirolimus but up to 2.2 in recent monts. He was started on lisinopril in October 2022 for hypertension which may have a hemodynamic effect to explain this  - he has had proteinuria, p to 1800mg/g albumin in 2014, which improved and now has low grade albuminuria.  - sirolimus can cause proteinuria  - increase lisinopril for proteinuric benefit- he is on 10mg bid  - consider SGLT2 inhbitor- will discuss with heart failure team and endocrine team  - check cystatin C  - check US kidney  # Hypertension: blood pressure near goal in the 130s at home  - increase lisinopril if proteinuria is higher, last assessment Fall 2021 was 120mg/g cr albumin  - can increase diuretic if necessary    # Anemia in chronic renal disease:   - Hgb: Stable  - Iron studies: check iron sat and B12    # Electrolytes:   - Potassium; level: High normal  - Bicarbonate; level: High normal    # Mineral Bone Disorder:   - Vitamin D; level is: Normal  - Calcium; level is:  Normal     Assessment and plan was discussed with patient and he voiced his understanding and agreement.  >65 minutes spent on day of service in review of records and coordination of care.     Consult:  Lucian Oliveira was seen in consultation at the request of Dr. Sheridan for CKD management.    Reason for Visit:  Lucian Oliveira is a 67 year old male with CKD from cardiorenal syndrome, diabetes, who presents for evaluation.    HPI:  He is a very pleasant male with significant cardiac history, with MI and ICM, s/p OHT in July 2020, CKD predating transplant with baseline  1.2-1.7 before and now up to 2.2 range.  He has been doing fairly well from transplant perspective and his medications are stable.  He was started on lisinopril for his blood pressure in Fall 2022.  He is on trulicity for diabetes and is trying to eat heathy and exercise  He is accompanied by his wife, who speaks a little bit of English.    He denies shortness of breath, he has some swelling which is manageable.  He takes his medications and has no major concerns.  He denies family history of kidney failure, but does have family history of diabetes.  He has diabetic retinopathy.    Renal History:   Kidney Disease and Medical Hx:  h/o HTN: Yes   and h/o DM: Yes     ROS:   A comprehensive review of systems was obtained and negative, except as noted in the HPI or PMH.    Active Medical Problems:  Patient Active Problem List   Diagnosis     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 (H)     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)     Hyperlipidemia LDL goal <100     Hypertension goal BP (blood pressure) < 140/90     CHF (NYHA class II, ACC/AHA stage C) (H)     CKD (chronic kidney disease) stage 3, GFR 30-59 ml/min (H)     Health Care Home     Automatic implantable cardioverter-defibrillator - [employer] single lead ICD, Not Dependent     Chronic systolic heart failure (H)     Proteinuria     Vitamin D deficiency     OA (osteoarthritis) of knee     Chondromalacia of patella, unspecified laterality     Pain in joint of left shoulder     Tinnitus     Ptosis of right eyelid     Secondary renal hyperparathyroidism (H)     Cortical cataract of both eyes     Moderate nonproliferative diabetic retinopathy, without macular edema, associated with type 2 diabetes mellitus     CHANTEL (obstructive sleep apnea)- severe (AHI 30)     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, unspecified whether long term insulin  use (H)     Nuclear cataract, nonsenile     Coronary artery disease involving native coronary artery of native heart without angina pectoris     Status post coronary angiogram     Dental caries     Heart transplant, orthotopic, status (H)     Heart replaced by transplant (H)     Sepsis (H)     Need for prophylactic antibiotic     Traction detachment of left retina     Immunodeficiency, unspecified (H)     CKD (chronic kidney disease) stage 4, GFR 15-29 ml/min (H)     HIT (heparin-induced thrombocytopenia) (H)     Atrial flutter, unspecified type (H)     Acute embolism and thrombosis of right axillary vein (H)     Nuclear senile cataract of right eye     PMH:   Medical record was reviewed and PMH was discussed with patient and noted below.  Past Medical History:   Diagnosis Date     CAD (coronary artery disease)      CHF (congestive heart failure) (H)      CKD (chronic kidney disease), stage III (H)      Cortical cataract of both eyes      Diabetes (H)      Hyperlipidemia      Hypertension      Infection due to Streptococcus mitis group 09/23/2020     Ischemic cardiomyopathy      Obesity      CHANTEL (obstructive sleep apnea)     occas cpap     CHANTEL (obstructive sleep apnea)- severe (AHI 30)      Osteoarthritis      PSH:   Past Surgical History:   Procedure Laterality Date     CATARACT IOL, RT/LT       COLONOSCOPY N/A 8/7/2019    Procedure: COLONOSCOPY, WITH POLYPECTOMY AND BIOPSY;  Surgeon: Chauncey Morataya MD;  Location:  GI     CV ANGIOGRAM CORONARY GRAFT N/A 7/2/2020    Procedure: Angiogram Coronary Graft;  Surgeon: Alex Lantigua MD;  Location: [facility] CARDIAC CATH LAB     CV CORONARY ANGIOGRAM N/A 7/2/2020    Procedure: CV CORONARY ANGIOGRAM;  Surgeon: Alex Lantigua MD;  Location: [facility] CARDIAC CATH LAB     CV CORONARY ANGIOGRAM N/A 7/20/2021    Procedure: CV CORONARY ANGIOGRAM;  Surgeon: Raimundo Hudson MD;  Location: [facility] CARDIAC CATH LAB     CV CORONARY ANGIOGRAM N/A  9/12/2022    Procedure: Coronary Angiogram- BIPLANE;  Surgeon: Raimundo Hudson MD;  [facility]     CV HEART BIOPSY N/A 7/27/2020    Procedure: Heart Biopsy;  Surgeon: Mario Burr MD;  [facility]     CV HEART BIOPSY N/A 8/3/2020    Procedure: CV HEART BIOPSY;  Surgeon: Alex Lantigua MD;  [facility]     CV HEART BIOPSY N/A 8/10/2020    Procedure: CV HEART BIOPSY;  Surgeon: Mario Burr MD;  [facility]     CV HEART BIOPSY N/A 8/17/2020    Procedure: CV HEART BIOPSY;  Surgeon: Raimundo Hudson MD;  [facility]     CV HEART BIOPSY N/A 8/31/2020    Procedure: CV HEART BIOPSY;  Surgeon: Moises Santos MD;  [facility]     CV HEART BIOPSY N/A 9/14/2020    Procedure: CV HEART BIOPSY;  Surgeon: Raimundo Hudson MD;  [facility]     CV HEART BIOPSY N/A 9/28/2020    Procedure: CV HEART BIOPSY;  Surgeon: Raimundo Hudson MD;  [facility]     CV HEART BIOPSY N/A 10/12/2020    Procedure: CV HEART BIOPSY;  Surgeon: John Stuart MD;  [facility]     CV HEART BIOPSY N/A 11/10/2020    Procedure: CV HEART BIOPSY;  Surgeon: John Stuart MD;  [facility]     CV HEART BIOPSY N/A 12/7/2020    Procedure: CV HEART BIOPSY;  Surgeon: Raimundo Hudson MD;  [facility]     CV HEART BIOPSY N/A 1/5/2021    Procedure: CV HEART BIOPSY;  Surgeon: Raimundo Hudson MD;  [facility]     CV HEART BIOPSY N/A 7/20/2021    Procedure: CV HEART BIOPSY;  Surgeon: Raimundo Hudson MD;  Location:  HEART CARDIAC CATH LAB     CV HEART BIOPSY N/A 9/28/2021    Procedure: CV HEART BIOPSY;  Surgeon: Raimundo Hudson MD;  Location:   HEART CARDIAC CATH LAB     CV HEART BIOPSY N/A 9/12/2022    Procedure: Heart Biopsy;  Surgeon: Raimundo Hudson MD;  Location:  HEART CARDIAC CATH LAB     CV INTRA AORTIC BALLOON N/A 7/14/2020    Procedure: RHC WITH LEAVE IN [place]/IABP;  Surgeon: Sonny Saleh MD;  Location:  HEART CARDIAC CATH LAB     CV INTRAVASULAR ULTRASOUND N/A 9/12/2022    Procedure: Intravascular Ultrasound;  Surgeon: Raimundo Hudson MD;  Location:  HEART CARDIAC CATH LAB     CV RIGHT HEART CATH MEASUREMENTS RECORDED N/A 3/25/2019    Procedure: CV RIGHT HEART CATH;  Surgeon: Moises Santos MD;  Location:  HEART CARDIAC CATH LAB     CV RIGHT HEART CATH MEASUREMENTS RECORDED N/A 7/10/2019    Procedure: CV RIGHT HEART CATH;  Surgeon: Jak Mccabe MD;  Location:  HEART CARDIAC CATH LAB     CV RIGHT HEART CATH MEASUREMENTS RECORDED N/A 7/8/2020    Procedure: Right Heart Cath with Leave In [place] already has ICU load;  Surgeon: Jak Mccabe MD;  Location:  HEART CARDIAC CATH LAB     CV RIGHT HEART CATH MEASUREMENTS RECORDED N/A 7/14/2020    Procedure: CV RIGHT HEART CATH;  Surgeon: Sonny Saleh MD;  Location:  HEART CARDIAC CATH LAB     CV RIGHT HEART CATH MEASUREMENTS RECORDED N/A 7/2/2020    Procedure: CV RIGHT HEART CATH;  Surgeon: Alex Lantigua MD;  Location:  HEART CARDIAC CATH LAB     CV RIGHT HEART CATH MEASUREMENTS RECORDED N/A 7/27/2020    Procedure: Right Heart Cath;  Surgeon: Mario Burr MD;  Location:  HEART CARDIAC CATH LAB     CV RIGHT HEART CATH MEASUREMENTS RECORDED N/A 8/3/2020    Procedure: Right Heart Cath;  Surgeon: Alex Lantigua MD;  [facility]     CV RIGHT HEART CATH MEASUREMENTS RECORDED N/A 8/10/2020    Procedure: CV RIGHT HEART CATH;  Surgeon: Mario Burr MD;  Location:  HEART CARDIAC CATH LAB     CV RIGHT HEART CATH MEASUREMENTS RECORDED N/A 8/17/2020    Procedure: CV RIGHT HEART CATH;   Surgeon: Raimundo Hudson MD;  Location:  HEART CARDIAC CATH LAB     CV RIGHT HEART CATH MEASUREMENTS RECORDED N/A 8/31/2020    Procedure: CV RIGHT HEART CATH;  Surgeon: Moises Santos MD;  Location:  HEART CARDIAC CATH LAB     CV RIGHT HEART CATH MEASUREMENTS RECORDED N/A 9/14/2020    Procedure: CV RIGHT HEART CATH;  Surgeon: Raimundo Hudson MD;  Location:  HEART CARDIAC CATH LAB     CV RIGHT HEART CATH MEASUREMENTS RECORDED N/A 9/28/2020    Procedure: CV RIGHT HEART CATH;  Surgeon: Raimundo Hudson MD;  Location:  HEART CARDIAC CATH LAB     CV RIGHT HEART CATH MEASUREMENTS RECORDED N/A 10/12/2020    Procedure: CV RIGHT HEART CATH;  Surgeon: John Stuart MD;  Location:  HEART CARDIAC CATH LAB     CV RIGHT HEART CATH MEASUREMENTS RECORDED N/A 11/10/2020    Procedure: CV RIGHT HEART CATH;  Surgeon: John Stuart MD;  Location:  HEART CARDIAC CATH LAB     CV RIGHT HEART CATH MEASUREMENTS RECORDED N/A 12/7/2020    Procedure: CV RIGHT HEART CATH;  Surgeon: Raimundo Hudson MD;  Location:  HEART CARDIAC CATH LAB     CV RIGHT HEART CATH MEASUREMENTS RECORDED N/A 1/5/2021    Procedure: CV RIGHT HEART CATH;  Surgeon: Raimundo Hudson MD;  Location:  HEART CARDIAC CATH LAB     CV RIGHT HEART CATH MEASUREMENTS RECORDED N/A 7/20/2021    Procedure: CV RIGHT HEART CATH;  Surgeon: Raimundo Hudson MD;  Location:  HEART CARDIAC CATH LAB     CV RIGHT HEART CATH MEASUREMENTS RECORDED N/A 9/28/2021    Procedure: CV RIGHT HEART CATH;  Surgeon: Raimundo Hudson MD;  Location:  HEART CARDIAC CATH LAB     CV RIGHT HEART CATH MEASUREMENTS RECORDED N/A 9/12/2022    Procedure: Right Heart Catheterization;  Surgeon: Raimundo Hudson MD;  Location:  HEART CARDIAC CATH LAB     EXTRACTION(S) DENTAL Left 7/13/2020    Procedure: EXTRACTION, TOOTH #11, 12, 13, 15, and 29;  Surgeon: Monica Chao DDS;   [facility]     IMPLANT AUTOMATIC IMPLANTABLE CARDIOVERTER DEFIBRILLATOR       INCISION AND DRAINAGE STERNUM W/ IRRIGATION SYSTEM, COMBINED N/A 9/23/2020    Procedure: INCISION AND DRAINAGE, LEFT SUPRACLAVICULAR WOUND INFECTION AND ABDOMENN WOUND.;  Surgeon: Ran Huertas MD;  [facility]     INSERT INTRAAORTIC BALLOON PUMP N/A 7/16/2020    Procedure: Subclavian Intra-Aortic Balloon Pump Placement;  Surgeon: Allan Sparrow MD;  [facility]     IRRIGATION AND DEBRIDEMENT CHEST WASHOUT, COMBINED N/A 9/28/2020    Procedure: IRRIGATION AND DEBRIDEMENT OF LEFT UPPER CHEST WOUND.;  Surgeon: Ran Huertas MD;  [facility]     PHACOEMULSIFICATION CLEAR CORNEA WITH STANDARD INTRAOCULAR LENS IMPLANT Right 2/6/2023    Procedure: RIGHT EYE PHACOEMULSIFICATION, CATARACT, WITH INTRAOCULAR LENS IMPLANT with trypan blue;  Surgeon: Triny Bunch MD;  [facility]     PHACOEMULSIFICATION CLEAR CORNEA WITH STANDARD IOL, VITRECTOMY PARSPLANA 25 GAGUE, COMBINED Left 12/10/2019    Procedure: PHACOEMULSIFICATION, CATARACT, CLEAR CORNEAL INCISION APPROACH, W STD INTRAOCULAR LENS IMPLANT INSERT + VITRECTOMY BY PARS PLANA  USING 25-GAUGE INSTRUMENTS. ENDOLASER, INFUSION OF 20% SF6 GAS;  Surgeon: Triny Bunch MD;  [facility]     PICC DOUBLE LUMEN PLACEMENT Left 07/28/2020    5Fr - 42cm, Basilic vein, mid SVC     PICC INSERTION Right 07/11/2020    basilic 44 cm total      TRANSPLANT HEART RECIPIENT N/A 7/19/2020    Procedure: REDO MEDIAN STERNOTOMY, TRANSPLANT, ORTHOTOPIC HEART, RECIPIENT, ON PUMP OXYGENATOR, REMOVAL OF CARDIAC DEFIBRILLATOR AND LEAD;  Surgeon: Griselli, Massimo, MD;  [facility]     VITRECTOMY, PARS PLANA APPROACH, USING 27-GAUGE INSTRUMENTS Left 12/21/2020    Procedure: 27 gauge pars plana vitectomy, membrane peel, perfluoroctane liquid (PFO), retinectomy, endolaser, Silicone Oil 1000 cs;  Surgeon: Triny Bunch MD;  [facility]     ZZC CABG, ARTERY-VEIN,  THREE  02/2008       Family Hx:   Family History   Problem Relation Age of Onset     Diabetes Sister      Diabetes Sister      Diabetes Brother      Macular Degeneration No family hx of      Glaucoma No family hx of      Myocardial Infarction No family hx of      Kidney Disease No family hx of      Anesthesia Reaction No family hx of      Bleeding Disorder No family hx of      Venous thrombosis No family hx of      Personal Hx:   Social History     Tobacco Use     Smoking status: Never     Smokeless tobacco: Never     Tobacco comments:     Never smoked; non-smoking household   Vaping Use     Vaping status: Never Used   Substance Use Topics     Alcohol use: No     Alcohol/week: 0.0 standard drinks of alcohol       Allergies:  Allergies   Allergen Reactions     Heparin Heparin Induced Thrombocytopenia     HIT screen sent 7/18/2020. CORRINE confirmed positive       Medications:  Current Outpatient Medications   Medication Sig     acetaminophen (TYLENOL) 325 MG tablet Take 3 tablets (975 mg) by mouth every 8 hours as needed for mild pain     Alcohol Swabs PADS Use to swab the area of the injection or sergio as directed   Per insurance coverage     aspirin (ASA) 81 MG chewable tablet Take 1 tablet (81 mg) by mouth daily (Patient taking differently: Take 81 mg by mouth every morning)     blood glucose (NO BRAND SPECIFIED) test strip Use to test blood sugar 4 times daily or as directed.     blood glucose monitoring (ACCU-CHEK FELIPE SMARTVIEW) meter device kit Use to test blood sugar 3-4 times daily, as directed.     blood glucose monitoring (SOFTCLIX) lancets 1 each 4 times daily Use to test blood sugars 2 times daily.     Calcium Carb-Cholecalciferol (CALCIUM CARBONATE-VITAMIN D3) 600-400 MG-UNIT TABS TAKE ONE TABLET BY MOUTH TWICE A DAY WITH MEALS     Dulaglutide (TRULICITY) 4.5 MG/0.5ML SOPN Inject 4.5 mg Subcutaneous once a week     furosemide (LASIX) 20 MG tablet Take 1 tablet (20 mg) by mouth daily (Patient taking  differently: Take 20 mg by mouth every morning)     HUMALOG KWIKPEN 100 UNIT/ML soln INJECT 8 TO 14 UNITS UNDER THE SKIN THREE TIMES DAILY BEFORE A MEAL PER SLIDING SCALE AS DIRECTED     lisinopril (ZESTRIL) 10 MG tablet Take 1 tablet (10 mg) by mouth 2 times daily     MAGNESIUM OXIDE 400 (240 Mg) MG tablet TAKE ONE TABLET BY MOUTH TWICE A DAY     ofloxacin (OCUFLOX) 0.3 % ophthalmic solution Place 1 drop into the right eye 4 times daily     order for DME Auto CPAP 8-15 cmH20     prednisoLONE acetate (PRED FORTE) 1 % ophthalmic suspension Place 1 drop into the right eye 2 times daily     rosuvastatin (CRESTOR) 10 MG tablet TAKE ONE TABLET BY MOUTH ONCE DAILY     senna-docusate (SENOKOT-S/PERICOLACE) 8.6-50 MG tablet Take 1 tablet by mouth 2 times daily as needed (hold for loose stools)     tamsulosin (FLOMAX) 0.4 MG capsule TAKE ONE CAPSULE BY MOUTH ONCE DAILY     insulin NPH (HUMULIN N KWIKPEN) 100 UNIT/ML injection GIve 40 units each morning and 12 at night subcutaneous     insulin  UNIT/ML injection GIve 40 units each morning and 12-16  units each evening BEFORE meals (Patient taking differently: Inject 40 Units Subcutaneous 2 times daily GIve 40 units each morning and 12 at night)     insulin pen needle (32G X 4 MM) 32G X 4 MM miscellaneous Use 5-6 pen needles daily or as directed.     ketorolac (ACULAR) 0.5 % ophthalmic solution Place 1 drop into the right eye 2 times daily     magnesium oxide (MAG-OX) 400 (241.3 Mg) MG tablet TAKE ONE TABLET BY MOUTH TWICE A DAY     polyethylene glycol (GOLYTELY) 236 g suspension The night before the exam at 6 pm drink an 8-ounce glass every 15 minutes until the jug is half empty. If you arrive before 11 AM: Drink the other half of the Golytely jug at 11 PM night before procedure. If you arrive after 11 AM: Drink the other half of the Golytely jug at 6 AM day of procedure. For additional instructions refer to your colonoscopy prep instructions.     prednisoLONE acetate  (PRED FORTE) 1 % ophthalmic suspension Place 1 drop Into the left eye daily     prednisoLONE acetate (PRED FORTE) 1 % ophthalmic suspension Place 1-2 drops Into the left eye 2 times daily (Patient taking differently: Place 1 drop Into the left eye every other day)     rosuvastatin (CRESTOR) 20 MG tablet Take 1 tablet (20 mg) by mouth daily     sirolimus (GENERIC EQUIVALENT) 0.5 MG tablet TAKE FOUR TABLETS BY MOUTH ONCE DAILY (Patient taking differently: Take 2 mg by mouth daily At noon)     tacrolimus (GENERIC EQUIVALENT) 1 MG capsule Take 3 capsules (3 mg) by mouth 2 times daily     Current Facility-Administered Medications   Medication     bevacizumab (AVASTIN) intravitreal inj 1.25 mg     bevacizumab (AVASTIN) intravitreal inj 1.25 mg     bevacizumab (AVASTIN) intravitreal inj 1.25 mg      Vitals:  /73   Pulse 100   SpO2 98%     Exam:  GENERAL APPEARANCE: alert and no distress  HENT: mouth without ulcers or lesions  LYMPHATICS: no cervical or supraclavicular nodes  RESP: lungs clear to auscultation - no rales, rhonchi or wheezes  CV: regular rhythm, normal rate, no rub, no murmur  EDEMA: mild LE edema bilaterally  ABDOMEN: soft, nondistended, nontender, bowel sounds normal  MS: extremities normal - no gross deformities noted, no evidence of inflammation in joints, no muscle tenderness  SKIN: no rash    LABS:   CMP  Recent Labs   Lab Test 05/03/23  0844 02/14/23  0821 02/06/23  1417 02/06/23  0917 02/02/23  1121 10/14/22  1220 07/19/21  0950 05/11/21  0905 03/02/21  0818 01/14/21  0811 01/05/21  0816    136  --   --  137 137   < > 141 142 137 139   POTASSIUM 4.8 4.7  --   --  4.5 4.6   < > 4.0 4.2 4.8 4.3   CHLORIDE 105 102  --   --  102 101   < > 107 108 106 107   CO2 25 25  --   --  27 26   < > 27 27 28 25   ANIONGAP 11 9  --   --  8 10   < > 6 7 4 7   * 170* 136* 211* 134* 163*   < > 98 110* 145* 137*   BUN 31.0* 31.8*  --   --  27.6* 21.4   < > 34* 36* 28 28   CR 2.23* 2.28*  --   --   2.26* 1.76*   < > 1.84* 1.83* 1.69* 1.68*   GFRESTIMATED 31* 30*  --   --  31* 42*   < > 37* 37* 41* 41*   GFRESTBLACK  --   --   --   --   --   --   --  43* 43* 48* 48*   BRYANNA 9.4 9.1  --   --  9.1 9.1   < > 9.0 9.2 9.2 9.2    < > = values in this interval not displayed.     Recent Labs   Lab Test 02/02/23  1121 09/12/22  0854 03/14/22  1014 11/17/21  0848   BILITOTAL 0.3 0.3 0.4 0.4   ALKPHOS 128 146* 118 120   ALT 19 23 20 24   AST 23 30 22 17     CBC  Recent Labs   Lab Test 05/03/23  0844 02/14/23  0821 02/02/23  1121 10/14/22  1220   HGB 11.0* 11.8* 12.1* 11.6*   WBC 5.7 5.9 6.1 4.9   RBC 3.74* 4.22* 4.34* 4.24*   HCT 33.8* 36.8* 37.4* 35.5*   MCV 90 87 86 84   MCH 29.4 28.0 27.9 27.4   MCHC 32.5 32.1 32.4 32.7   RDW 16.0* 14.8 14.9 15.9*    171 180 185     URINE STUDIES  Recent Labs   Lab Test 09/28/21  0926 09/25/20  1241 09/22/20  1448 08/25/20  0731 06/26/15  1640 04/09/15  0735   COLOR Light Yellow Yellow Yellow Light Yellow   < > Yellow   APPEARANCE Clear Clear Clear Clear   < > Clear   URINEGLC 50* 70* 150* 300*   < > Negative   URINEBILI Negative Negative Negative Negative   < > Negative   URINEKETONE Negative Negative Negative Negative   < > Negative   SG 1.016 1.018 1.017 1.011   < > 1.010   UBLD Negative Negative Negative Negative   < > Negative   URINEPH 6.5 5.5 5.0 5.0   < > 6.0   PROTEIN 10* 10* 10* Negative   < > Trace*   UROBILINOGEN  --   --   --   --   --  0.2   NITRITE Negative Negative Negative Negative   < > Negative   LEUKEST Negative Negative Negative Negative   < > Negative   RBCU 0 0 <1 <1   < > O - 2   WBCU 0 0 1 0   < > O - 2    < > = values in this interval not displayed.     Recent Labs   Lab Test 03/14/22  1408 10/03/18  0725 07/26/16  1006 04/09/15  0735   [facility] 0.29* 0.06 0.18 0.45*     PTH  Recent Labs   Lab Test 05/03/23  0844 08/16/18  0834 07/26/16  0953   PTHI 40 180* 110*     IRON STUDIES  Recent Labs   Lab Test 09/22/20  1256 08/24/20  2026 07/08/19  1021   IRON 66 88  49    249 247   [facility] 24 35 20   SEVERIANO 447* 388 156         Mónica Aravind Shelton MD

## 2023-05-08 NOTE — PATIENT INSTRUCTIONS
Ultrasound of the kidney some time  Urine test twice a year  Blood pressure 120-130/80    If you are very sick (vomiting, not drinking water), can hold lisinopril and furosemide      If swelling is worse, can take extra furosemide

## 2023-05-08 NOTE — LETTER
5/8/2023       RE: Lucian Oliveira  [address] [facility] [address]     Dear Colleague,    Thank you for referring your patient, Lucian Oliveira, to the [facility] at [facility]. Please see a copy of my visit note below.    Assessment and Plan:  67 year old male with history of CAD s/p CABG with ICM s/p OHT in 2020, CKD with baseline Scr 1.2-1.7 before transplant, up to 2.2 since Fall 2022, diabetes for 20 years, who presents for evaluation.  His Scr is near 2.2, up from 1.7 in Fall 2022.    # CKD:  He has long standing CKD, baseline 1.2-1.7 before transplant, and was relatively stable, on combination of tacrolimus and sirolimus but up to 2.2 in recent monts. He was started on lisinopril in October 2022 for hypertension which may have a hemodynamic effect to explain this  - he has had proteinuria, p to 1800mg/g albumin in 2014, which improved and now has low grade albuminuria.  - sirolimus can cause proteinuria  - increase lisinopril for proteinuric benefit- he is on 10mg bid  - consider SGLT2 inhbitor- will discuss with heart failure team and endocrine team  - check cystatin C  - check US kidney  # Hypertension: blood pressure near goal in the 130s at home  - increase lisinopril if proteinuria is higher, last assessment Fall 2021 was 120mg/g cr albumin  - can increase diuretic if necessary    # Anemia in chronic renal disease:   - Hgb: Stable  - Iron studies: check iron sat and B12    # Electrolytes:   - Potassium; level: High normal  - Bicarbonate; level: High normal    # Mineral Bone Disorder:   - Vitamin D; level is: Normal  - Calcium; level is:  Normal     Assessment and plan was discussed with patient and he voiced his understanding and agreement.  >65 minutes spent on day of service in review of records and coordination of care.     Consult:  Lucian Oliveira was seen in consultation at the request of   rAvin for CKD management.    Reason for Visit:  Lucian Oliveira is a 67 year old male with CKD from cardiorenal syndrome, diabetes, who presents for evaluation.    HPI:  He is a very pleasant male with significant cardiac history, with MI and ICM, s/p OHT in July 2020, CKD predating transplant with baseline 1.2-1.7 before and now up to 2.2 range.  He has been doing fairly well from transplant perspective and his medications are stable.  He was started on lisinopril for his blood pressure in Fall 2022.  He is on trulicity for diabetes and is trying to eat heathy and exercise  He is accompanied by his wife, who speaks a little bit of English.    He denies shortness of breath, he has some swelling which is manageable.  He takes his medications and has no major concerns.  He denies family history of kidney failure, but does have family history of diabetes.  He has diabetic retinopathy.    Renal History:   Kidney Disease and Medical Hx:  h/o HTN: Yes   and h/o DM: Yes     ROS:   A comprehensive review of systems was obtained and negative, except as noted in the HPI or PMH.    Active Medical Problems:  Patient Active Problem List   Diagnosis    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 (H)    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)    Hyperlipidemia LDL goal <100    Hypertension goal BP (blood pressure) < 140/90    CHF (NYHA class II, ACC/AHA stage C) (H)    CKD (chronic kidney disease) stage 3, GFR 30-59 ml/min (H)    Health Care Home    Automatic implantable cardioverter-defibrillator - [employer] single lead ICD, Not Dependent    Chronic systolic heart failure (H)    Proteinuria    Vitamin D deficiency    OA (osteoarthritis) of knee    Chondromalacia of patella, unspecified laterality    Pain in joint of left shoulder    Tinnitus    Ptosis of right eyelid    Secondary renal hyperparathyroidism (H)    Cortical cataract of both  eyes    Moderate nonproliferative diabetic retinopathy, without macular edema, associated with type 2 diabetes mellitus    CHANTEL (obstructive sleep apnea)- severe (AHI 30)    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, unspecified whether long term insulin use (H)    Nuclear cataract, nonsenile    Coronary artery disease involving native coronary artery of native heart without angina pectoris    Status post coronary angiogram    Dental caries    Heart transplant, orthotopic, status (H)    Heart replaced by transplant (H)    Sepsis (H)    Need for prophylactic antibiotic    Traction detachment of left retina    Immunodeficiency, unspecified (H)    CKD (chronic kidney disease) stage 4, GFR 15-29 ml/min (H)    HIT (heparin-induced thrombocytopenia) (H)    Atrial flutter, unspecified type (H)    Acute embolism and thrombosis of right axillary vein (H)    Nuclear senile cataract of right eye     PMH:   Medical record was reviewed and PMH was discussed with patient and noted below.  Past Medical History:   Diagnosis Date    CAD (coronary artery disease)     CHF (congestive heart failure) (H)     CKD (chronic kidney disease), stage III (H)     Cortical cataract of both eyes     Diabetes (H)     Hyperlipidemia     Hypertension     Infection due to Streptococcus mitis group 09/23/2020    Ischemic cardiomyopathy     Obesity     CHANTEL (obstructive sleep apnea)     occas cpap    CHANTEL (obstructive sleep apnea)- severe (AHI 30)     Osteoarthritis      PSH:   Past Surgical History:   Procedure Laterality Date    CATARACT IOL, RT/LT      COLONOSCOPY N/A 8/7/2019    Procedure: COLONOSCOPY, WITH POLYPECTOMY AND BIOPSY;  Surgeon: Chauncey Morataya MD;  Location:  GI    CV ANGIOGRAM CORONARY GRAFT N/A 7/2/2020    Procedure: Angiogram Coronary Graft;  Surgeon: Alex Lantigua MD;  Location:  HEART CARDIAC CATH LAB    CV CORONARY ANGIOGRAM N/A 7/2/2020    Procedure: CV CORONARY  ANGIOGRAM;  Surgeon: Alex Lantigua MD;  Location: [facility] LAB    CV CORONARY ANGIOGRAM N/A 7/20/2021    Procedure: CV CORONARY ANGIOGRAM;  Surgeon: Raimundo Hudson MD;  Location: [facility] HE[facility]ARDI[facility] CATH LAB    CV CORONARY ANGIOGRAM N/A 9/12/2022    Procedure: Coronary Angiogram- BIPLANE;  Surgeon: Raimundo Hudson MD;  Location: [facility]ARD[facility]AC CATH LAB    CV HEART BIOPSY N/A 7/27/2020    Procedure: Heart Biopsy;  Surgeon: Mario Burr MD;  Location: [facility]ARD[facility] CATH LAB    CV HEART BIOPSY N/A 8/3/2020    Procedure: CV HEART BIOPSY;  Surgeon: Aelx Lantigua MD;  Location: [facility]ARD[facility] CATH LAB    CV HEART BIOPSY N/A 8/10/2020    Procedure: CV HEART BIOPSY;  Surgeon: Mario Burr MD;  Location: [facility] C[facility] LAB    CV HEART BIOPSY N/A 8/17/2020    Procedure: CV HEART BIOPSY;  Surgeon: Raimundo Hudson MD;  Location: [facility] CATH LAB    CV HEART BIOPSY N/A 8/31/2020    Procedure: CV HEART BIOPSY;  Surgeon: Moises Santos MD;  Location:  HEART CARDIAC CATH LAB    CV HEART BIOPSY N/A 9/14/2020    Procedure: CV HEART BIOPSY;  Surgeon: Raimundo Hudson MD;  Location: [facility]    CV HEART BIOPSY N/A 9/28/2020    Procedure: CV HEART BIOPSY;  Surgeon: Raimundo Hudson MD;  Location: [facility]    CV HEART BIOPSY N/A 10/12/2020    Procedure: CV HEART BIOPSY;  Surgeon: John Stuart MD;  Location: [facility]    CV HEART BIOPSY N/A 11/10/2020    Procedure: CV HEART BIOPSY;  Surgeon: John Stuart MD;  Location: [facility]    CV HEART BIOPSY N/A 12/7/2020    Procedure: CV HEART BIOPSY;  Surgeon: Raimundo Hudson MD;  Location: [facility]    CV HEART BIOPSY N/A 1/5/2021    Procedure: CV HEART BIOPSY;  Surgeon: Raimundo Hudson MD;  Location:  HEART CARDIAC CATH LAB    CV HEART  BIOPSY N/A 7/20/2021    Procedure: CV HEART BIOPSY;  Surgeon: Raimundo Hudson MD;  Location: [facility]    CV HEART BIOPSY N/A 9/28/2021    Procedure: CV HEART BIOPSY;  Surgeon: Raimundo Hudson MD;  Location: [facility]    CV HEART BIOPSY N/A 9/12/2022    Procedure: Heart Biopsy;  Surgeon: Raimundo Hudson MD;  Location:  HEART CARDIAC CATH LAB    CV INTRA AORTIC BALLOON N/A 7/14/2020    Procedure: RHC WITH LEAVE IN SWAN/IABP;  Surgeon: Sonny Saleh MD;  Location:  HEART CARDIAC CATH LAB    CV INTRAVASULAR ULTRASOUND N/A 9/12/2022    Procedure: Intravascular Ultrasound;  Surgeon: Raimundo Hudson MD;  Location:  HEART CARDIAC CATH LAB    CV RIGHT HEART CATH MEASUREMENTS RECORDED N/A 3/25/2019    Procedure: CV RIGHT HEART CATH;  Surgeon: Moises Santos MD;  Location:  HEART CARDIAC CATH LAB    CV RIGHT HEART CATH MEASUREMENTS RECORDED N/A 7/10/2019    Procedure: CV RIGHT HEART CATH;  Surgeon: Jak Mccabe MD;  Location:  HEART CARDIAC CATH LAB    CV RIGHT HEART CATH MEASUREMENTS RECORDED N/A 7/8/2020    Procedure: Right Heart Cath with Leave In [place] already has ICU load;  Surgeon: Jak Mccabe MD;  Location:  HEART CARDIAC CATH LAB    CV RIGHT HEART CATH MEASUREMENTS RECORDED N/A 7/14/2020    Procedure: CV RIGHT HEART CATH;  Surgeon: Sonny Saleh MD;  Location:  HEART CARDIAC CATH LAB    CV RIGHT HEART CATH MEASUREMENTS RECORDED N/A 7/2/2020    Procedure: CV RIGHT HEART CATH;  Surgeon: Alex Lantigua MD;  Location:  HEART CARDIAC CATH LAB    CV RIGHT HEART CATH MEASUREMENTS RECORDED N/A 7/27/2020    Procedure: Right Heart Cath;  Surgeon: Mario Burr MD;  Location:  HEART CARDIAC CATH LAB    CV RIGHT HEART CATH MEASUREMENTS RECORDED N/A 8/3/2020    Procedure: Right Heart Cath;  Surgeon: Alex Lantigua MD;  Location: [facility]    CV RIGHT HEART CATH  MEASUREMENTS RECORDED N/A 8/10/2020    Procedure: CV RIGHT HEART CATH;  Surgeon: Mario Burr MD;  Location: [facility]    CV RIGHT HEART CATH MEASUREMENTS RECORDED N/A 8/17/2020    Procedure: CV RIGHT HEART CATH;  Surgeon: Raimundo Hudson MD;  Location:  HEART CARDIAC CATH LAB    CV RIGHT HEART CATH MEASUREMENTS RECORDED N/A 8/31/2020    Procedure: CV RIGHT HEART CATH;  Surgeon: Moises Santos MD;  Location:  HEART CARDIAC CATH LAB    CV RIGHT HEART CATH MEASUREMENTS RECORDED N/A 9/14/2020    Procedure: CV RIGHT HEART CATH;  Surgeon: Raimundo Hudson MD;  Location:  HEART CARDIAC CATH LAB    CV RIGHT HEART CATH MEASUREMENTS RECORDED N/A 9/28/2020    Procedure: CV RIGHT HEART CATH;  Surgeon: Raimundo Hudson MD;  Location:  HEART CARDIAC CATH LAB    CV RIGHT HEART CATH MEASUREMENTS RECORDED N/A 10/12/2020    Procedure: CV RIGHT HEART CATH;  Surgeon: John Stuart MD;  Location: [facility]    CV RIGHT HEART CATH MEASUREMENTS RECORDED N/A 11/10/2020    Procedure: CV RIGHT HEART CATH;  Surgeon: John Stuart MD;  Location:  HEART CARDIAC CATH LAB    CV RIGHT HEART CATH MEASUREMENTS RECORDED N/A 12/7/2020    Procedure: CV RIGHT HEART CATH;  Surgeon: Raimundo Hudson MD;  Location:  HEART CARDIAC CATH LAB    CV RIGHT HEART CATH MEASUREMENTS RECORDED N/A 1/5/2021    Procedure: CV RIGHT HEART CATH;  Surgeon: Raimundo Hudson MD;  Location:  HEART CARDIAC CATH LAB    CV RIGHT HEART CATH MEASUREMENTS RECORDED N/A 7/20/2021    Procedure: CV RIGHT HEART CATH;  Surgeon: Raimundo Hudson MD;  Location:  HEART CARDIAC CATH LAB    CV RIGHT HEART CATH MEASUREMENTS RECORDED N/A 9/28/2021    Procedure: CV RIGHT HEART CATH;  Surgeon: Raimundo Hudson MD;  Location:  HEART CARDIAC CATH LAB    CV RIGHT HEART CATH MEASUREMENTS RECORDED N/A 9/12/2022    Procedure: Right Heart  Catheterization;  Surgeon: Raimundo Hudson MD;  [facility]    EXTRACTION(S) DENTAL Left 7/13/2020    Procedure: EXTRACTION, TOOTH #11, 12, 13, 15, and 29;  Surgeon: Monica Chao DDS;  Location: [facility] OR    IMPLANT AUTOMATIC IMPLANTABLE CARDIOVERTER DEFIBRILLATOR      INCISION AND DRAINAGE STERNUM W/ IRRIGATION SYSTEM, COMBINED N/A 9/23/2020    Procedure: INCISION AND DRAINAGE, LEFT SUPRACLAVICULAR WOUND INFECTION AND ABDOMENN WOUND.;  Surgeon: Ran Huertas MD;  [facility]    INSERT INTRAAORTIC BALLOON PUMP N/A 7/16/2020    Procedure: Subclavian Intra-Aortic Balloon Pump Placement;  Surgeon: Allan Sparrow MD;  [facility]    IRRIGATION AND DEBRIDEMENT CHEST WASHOUT, COMBINED N/A 9/28/2020    Procedure: IRRIGATION AND DEBRIDEMENT OF LEFT UPPER CHEST WOUND.;  Surgeon: Ran Huertas MD;  [facility]    PHACOEMULSIFICATION CLEAR CORNEA WITH STANDARD INTRAOCULAR LENS IMPLANT Right 2/6/2023    Procedure: RIGHT EYE PHACOEMULSIFICATION, CATARACT, WITH INTRAOCULAR LENS IMPLANT with trypan blue;  Surgeon: Triny Bunch MD;  Location: [facility] OR    PHACOEMULSIFICATION CLEAR CORNEA WITH STANDARD IOL, VITRECTOMY PARSPLANA 25 GAGUE, COMBINED Left 12/10/2019    Procedure: PHACOEMULSIFICATION, CATARACT, CLEAR CORNEAL INCISION APPROACH, W STD INTRAOCULAR LENS IMPLANT INSERT + VITRECTOMY BY PARS PLANA  USING 25-GAUGE INSTRUMENTS. ENDOLASER, INFUSION OF 20% SF6 GAS;  Surgeon: Triny Bunch MD;  [facility]: [facility]    PICC DOUBLE LUMEN PLACEMENT Left 07/28/2020    5Fr - 42cm, Basilic vein, mid SVC    PICC INSERTION Right 07/11/2020    basilic 44 cm total     TRANSPLANT HEART RECIPIENT N/A 7/19/2020    Procedure: REDO MEDIAN STERNOTOMY, TRANSPLANT, ORTHOTOPIC HEART, RECIPIENT, ON PUMP OXYGENATOR, REMOVAL OF CARDIAC DEFIBRILLATOR AND LEAD;  Surgeon: Griselli, Massimo, MD;  [facility]    VITRECTOMY, PARS PLANA APPROACH, USING 27-GAUGE INSTRUMENTS Left 12/21/2020     Procedure: 27 gauge pars plana vitectomy, membrane peel, perfluoroctane liquid (PFO), retinectomy, endolaser, Silicone Oil 1000 cs;  Surgeon: Triny Bunch MD;  Location: [facility] CABG, ARTERY-VEIN, THREE  02/2008       Family Hx:   Family History   Problem Relation Age of Onset    Diabetes Sister     Diabetes Sister     Diabetes Brother     Macular Degeneration No family hx of     Glaucoma No family hx of     Myocardial Infarction No family hx of     Kidney Disease No family hx of     Anesthesia Reaction No family hx of     Bleeding Disorder No family hx of     Venous thrombosis No family hx of      Personal Hx:   Social History     Tobacco Use    Smoking status: Never    Smokeless tobacco: Never    Tobacco comments:     Never smoked; non-smoking household   Vaping Use    Vaping status: Never Used   Substance Use Topics    Alcohol use: No     Alcohol/week: 0.0 standard drinks of alcohol       Allergies:  Allergies   Allergen Reactions    Heparin Heparin Induced Thrombocytopenia     HIT screen sent 7/18/2020. CORRINE confirmed positive       Medications:  Current Outpatient Medications   Medication Sig    acetaminophen (TYLENOL) 325 MG tablet Take 3 tablets (975 mg) by mouth every 8 hours as needed for mild pain    Alcohol Swabs PADS Use to swab the area of the injection or sergio as directed   Per insurance coverage    aspirin (ASA) 81 MG chewable tablet Take 1 tablet (81 mg) by mouth daily (Patient taking differently: Take 81 mg by mouth every morning)    blood glucose (NO BRAND SPECIFIED) test strip Use to test blood sugar 4 times daily or as directed.    blood glucose monitoring (ACCU-CHEK FELIPE SMARTVIEW) meter device kit Use to test blood sugar 3-4 times daily, as directed.    blood glucose monitoring (SOFTCLIX) lancets 1 each 4 times daily Use to test blood sugars 2 times daily.    Calcium Carb-Cholecalciferol (CALCIUM CARBONATE-VITAMIN D3) 600-400 MG-UNIT TABS TAKE ONE TABLET BY MOUTH TWICE A DAY  WITH MEALS    Dulaglutide (TRULICITY) 4.5 MG/0.5ML SOPN Inject 4.5 mg Subcutaneous once a week    furosemide (LASIX) 20 MG tablet Take 1 tablet (20 mg) by mouth daily (Patient taking differently: Take 20 mg by mouth every morning)    HUMALOG KWIKPEN 100 UNIT/ML soln INJECT 8 TO 14 UNITS UNDER THE SKIN THREE TIMES DAILY BEFORE A MEAL PER SLIDING SCALE AS DIRECTED    lisinopril (ZESTRIL) 10 MG tablet Take 1 tablet (10 mg) by mouth 2 times daily    MAGNESIUM OXIDE 400 (240 Mg) MG tablet TAKE ONE TABLET BY MOUTH TWICE A DAY    ofloxacin (OCUFLOX) 0.3 % ophthalmic solution Place 1 drop into the right eye 4 times daily    order for DME Auto CPAP 8-15 cmH20    prednisoLONE acetate (PRED FORTE) 1 % ophthalmic suspension Place 1 drop into the right eye 2 times daily    rosuvastatin (CRESTOR) 10 MG tablet TAKE ONE TABLET BY MOUTH ONCE DAILY    senna-docusate (SENOKOT-S/PERICOLACE) 8.6-50 MG tablet Take 1 tablet by mouth 2 times daily as needed (hold for loose stools)    tamsulosin (FLOMAX) 0.4 MG capsule TAKE ONE CAPSULE BY MOUTH ONCE DAILY    insulin NPH (HUMULIN N KWIKPEN) 100 UNIT/ML injection GIve 40 units each morning and 12 at night subcutaneous    insulin  UNIT/ML injection GIve 40 units each morning and 12-16  units each evening BEFORE meals (Patient taking differently: Inject 40 Units Subcutaneous 2 times daily GIve 40 units each morning and 12 at night)    insulin pen needle (32G X 4 MM) 32G X 4 MM miscellaneous Use 5-6 pen needles daily or as directed.    ketorolac (ACULAR) 0.5 % ophthalmic solution Place 1 drop into the right eye 2 times daily    magnesium oxide (MAG-OX) 400 (241.3 Mg) MG tablet TAKE ONE TABLET BY MOUTH TWICE A DAY    polyethylene glycol (GOLYTELY) 236 g suspension The night before the exam at 6 pm drink an 8-ounce glass every 15 minutes until the jug is half empty. If you arrive before 11 AM: Drink the other half of the [employer] jug at 11 PM night before procedure. If you arrive after 11  AM: Drink the other half of the [employer] jug at 6 AM day of procedure. For additional instructions refer to your colonoscopy prep instructions.    prednisoLONE acetate (PRED FORTE) 1 % ophthalmic suspension Place 1 drop Into the left eye daily    prednisoLONE acetate (PRED FORTE) 1 % ophthalmic suspension Place 1-2 drops Into the left eye 2 times daily (Patient taking differently: Place 1 drop Into the left eye every other day)    rosuvastatin (CRESTOR) 20 MG tablet Take 1 tablet (20 mg) by mouth daily    sirolimus (GENERIC EQUIVALENT) 0.5 MG tablet TAKE FOUR TABLETS BY MOUTH ONCE DAILY (Patient taking differently: Take 2 mg by mouth daily At noon)    tacrolimus (GENERIC EQUIVALENT) 1 MG capsule Take 3 capsules (3 mg) by mouth 2 times daily     Current Facility-Administered Medications   Medication    bevacizumab (AVASTIN) intravitreal inj 1.25 mg    bevacizumab (AVASTIN) intravitreal inj 1.25 mg    bevacizumab (AVASTIN) intravitreal inj 1.25 mg      Vitals:  /73   Pulse 100   SpO2 98%     Exam:  GENERAL APPEARANCE: alert and no distress  HENT: mouth without ulcers or lesions  LYMPHATICS: no cervical or supraclavicular nodes  RESP: lungs clear to auscultation - no rales, rhonchi or wheezes  CV: regular rhythm, normal rate, no rub, no murmur  EDEMA: mild LE edema bilaterally  ABDOMEN: soft, nondistended, nontender, bowel sounds normal  MS: extremities normal - no gross deformities noted, no evidence of inflammation in joints, no muscle tenderness  SKIN: no rash    LABS:   CMP  Recent Labs   Lab Test 05/03/23  0844 02/14/23  0821 02/06/23  1417 02/06/23  0917 02/02/23  1121 10/14/22  1220 07/19/21  0950 05/11/21  0905 03/02/21  0818 01/14/21  0811 01/05/21  0816    136  --   --  137 137   < > 141 142 137 139   POTASSIUM 4.8 4.7  --   --  4.5 4.6   < > 4.0 4.2 4.8 4.3   CHLORIDE 105 102  --   --  102 101   < > 107 108 106 107   CO2 25 25  --   --  27 26   < > 27 27 28 25   ANIONGAP 11 9  --   --  8 10   <  > 6 7 4 7   * 170* 136* 211* 134* 163*   < > 98 110* 145* 137*   BUN 31.0* 31.8*  --   --  27.6* 21.4   < > 34* 36* 28 28   CR 2.23* 2.28*  --   --  2.26* 1.76*   < > 1.84* 1.83* 1.69* 1.68*   GFRESTIMATED 31* 30*  --   --  31* 42*   < > 37* 37* 41* 41*   GFRESTBLACK  --   --   --   --   --   --   --  43* 43* 48* 48*   BRYANNA 9.4 9.1  --   --  9.1 9.1   < > 9.0 9.2 9.2 9.2    < > = values in this interval not displayed.     Recent Labs   Lab Test 02/02/23  1121 09/12/22  0854 03/14/22  1014 11/17/21  0848   BILITOTAL 0.3 0.3 0.4 0.4   ALKPHOS 128 146* 118 120   ALT 19 23 20 24   AST 23 30 22 17     CBC  Recent Labs   Lab Test 05/03/23  0844 02/14/23  0821 02/02/23  1121 10/14/22  1220   HGB 11.0* 11.8* 12.1* 11.6*   WBC 5.7 5.9 6.1 4.9   RBC 3.74* 4.22* 4.34* 4.24*   HCT 33.8* 36.8* 37.4* 35.5*   MCV 90 87 86 84   MCH 29.4 28.0 27.9 27.4   MCHC 32.5 32.1 32.4 32.7   RDW 16.0* 14.8 14.9 15.9*    171 180 185     URINE STUDIES  Recent Labs   Lab Test 09/28/21  0926 09/25/20  1241 09/22/20  1448 08/25/20  0731 06/26/15  1640 04/09/15  0735   COLOR Light Yellow Yellow Yellow Light Yellow   < > Yellow   APPEARANCE Clear Clear Clear Clear   < > Clear   URINEGLC 50* 70* 150* 300*   < > Negative   URINEBILI Negative Negative Negative Negative   < > Negative   URINEKETONE Negative Negative Negative Negative   < > Negative   SG 1.016 1.018 1.017 1.011   < > 1.010   UBLD Negative Negative Negative Negative   < > Negative   URINEPH 6.5 5.5 5.0 5.0   < > 6.0   PROTEIN 10* 10* 10* Negative   < > Trace*   UROBILINOGEN  --   --   --   --   --  0.2   NITRITE Negative Negative Negative Negative   < > Negative   LEUKEST Negative Negative Negative Negative   < > Negative   RBCU 0 0 <1 <1   < > O - 2   WBCU 0 0 1 0   < > O - 2    < > = values in this interval not displayed.     Recent Labs   Lab Test 03/14/22  1408 10/03/18  0725 07/26/16  1006 04/09/15  0735   UTPG 0.29* 0.06 0.18 0.45*     PTH  Recent Labs   Lab Test  05/03/23  0844 08/16/18  0834 07/26/16  0953   PTHI 40 180* 110*     IRON STUDIES  Recent Labs   Lab Test 09/22/20  1256 08/24/20 2026 07/08/19  1021   IRON 66 88 49    249 247   IRONSAT 24 35 20   SEVERIANO 447* 388 156         Mónica Shelton MD      Again, thank you for allowing me to participate in the care of your patient.      Sincerely,    Mónica Shelton MD

## 2023-05-09 LAB
ALBUMIN SERPL BCG-MCNC: 3.8 G/DL (ref 3.5–5.2)
ANION GAP SERPL CALCULATED.3IONS-SCNC: 14 MMOL/L (ref 7–15)
BUN SERPL-MCNC: 31 MG/DL (ref 8–23)
CALCIUM SERPL-MCNC: 9.3 MG/DL (ref 8.8–10.2)
CHLORIDE SERPL-SCNC: 105 MMOL/L (ref 98–107)
CREAT SERPL-MCNC: 2.23 MG/DL (ref 0.67–1.17)
DEPRECATED HCO3 PLAS-SCNC: 23 MMOL/L (ref 22–29)
GFR SERPL CREATININE-BSD FRML MDRD: 31 ML/MIN/1.73M2
GLUCOSE SERPL-MCNC: 136 MG/DL (ref 70–99)
PHOSPHATE SERPL-MCNC: 3.8 MG/DL (ref 2.5–4.5)
POTASSIUM SERPL-SCNC: 5.1 MMOL/L (ref 3.4–5.3)
SODIUM SERPL-SCNC: 142 MMOL/L (ref 136–145)

## 2023-05-10 ENCOUNTER — TELEPHONE (OUTPATIENT)
Dept: ENDOCRINOLOGY | Facility: CLINIC | Age: 68
End: 2023-05-10

## 2023-05-10 ENCOUNTER — OFFICE VISIT (OUTPATIENT)
Dept: DERMATOLOGY | Facility: CLINIC | Age: 68
End: 2023-05-10
Payer: COMMERCIAL

## 2023-05-10 DIAGNOSIS — L81.4 SOLAR LENTIGO: ICD-10-CM

## 2023-05-10 DIAGNOSIS — D22.9 MULTIPLE BENIGN NEVI: ICD-10-CM

## 2023-05-10 DIAGNOSIS — L82.1 SEBORRHEIC KERATOSIS: Primary | ICD-10-CM

## 2023-05-10 DIAGNOSIS — Z94.1 HEART REPLACED BY TRANSPLANT (H): ICD-10-CM

## 2023-05-10 DIAGNOSIS — D18.01 CHERRY ANGIOMA: ICD-10-CM

## 2023-05-10 PROCEDURE — 99213 OFFICE O/P EST LOW 20 MIN: CPT | Performed by: DERMATOLOGY

## 2023-05-10 ASSESSMENT — PAIN SCALES - GENERAL: PAINLEVEL: NO PAIN (0)

## 2023-05-10 NOTE — LETTER
5/10/2023       RE: Lucian Oliveira  [address] [facility] [address]     Dear Colleague,    Thank you for referring your patient, Lucian Oliveira, to the [facility] [place] [facility]. Please see a copy of my visit note below.    [facility] Dermatology Note  Encounter Date: May 10, 2023  Office Visit     Dermatology Problem List:  1. Hx heart transplant 2/2 ischemic cardiomyopathy 7/19/20  - sirolimus, tacrolimus  ____________________________________________    Assessment & Plan:    # History of Heart solid organ transplantation in the setting of ischemic cardiomyopathy.   - We reviewed with the patient that due to the immunosuppressive medications used following transplant, solid organ transplant recipients are at a roughly 65-fold increased risk of squamous cell carcinoma, 20-fold increased risk of basal cell carcinoma, and 3.4 fold increased risk of melanoma compared to the general population.   - Based on the patient's risk factors and the Skin and Ultraviolet Neoplasia Transplant Risk Assessment Tool, the patient's risk of skin cancer following transplant is categorized as:  - Medium Risk: 5-Year Risk of 6.15% and 10-Year Risk of 13.73%  - We briefly reviewed prevention methods for reducing skin cancer after transplantation including avoidance of sun exposure, avoidance of indoor tanning beds or other indoor tanning devices, use of protective clothing (e.g. wide-brimmed hats, long sleeves, long pants), SPF 30 or greater sunscreen, and UV-protective sunglasses when outdoors.   - We discussed self skin examinations and partner-skin examinations.      # Benign lesions: Multiple benign nevi, solar lentigos, seborrheic keratoses, cherry angiomas. Explained to patient benign nature of lesion. No treatment is necessary at this time unless the lesion changes or becomes symptomatic.   - ABCDs of  melanoma were discussed and self skin checks were advised.  - Sun precaution was advised including the use of sun screens of SPF 30 or higher, sun protective clothing, and avoidance of tanning beds.     Procedures Performed:   None.     Follow-up: 1 year(s) in-person, or earlier for new or changing lesions    Staff and Scribe:     Scribe Disclosure:  I, Francheska Hussein, am serving as a scribe to document services personally performed by Cristian Delacruz MD based on data collection and the provider's statements to me.     Provider Disclosure:   The documentation recorded by the scribe accurately reflects the services I personally performed and the decisions made by me.    Cristian Delacruz MD    Department of Dermatology  [facility] [place][note 2]: Phone: [phone], Fax:[phone]  [facility]: Phone: [phone] Fax: [phone]  ____________________________________________    CC: Skin Check (No areas of concern)    HPI:  Mr. Lucian Oliveira is a(n) 67 year old male who presents today as a return patient for FBSE. Last seen in dermatology by me on 2/16/22, at which time no spots of concern were noted.     Today, patient reports no specific spots of concern today. He does not use sunscreen. He wears protective sun clothing     Patient is otherwise feeling well, without additional skin concerns.    Labs Reviewed:  N/A    Physical Exam:  Vitals: There were no vitals taken for this visit.  SKIN: Total skin excluding the undergarment areas was performed. The exam included the head/face, neck, both arms, chest, back, abdomen, both legs, digits and/or nails.   - There are dome shaped bright red papules on the trunk and extremities.   - Multiple regular brown pigmented macules and papules are identified on the trunk and extremities.   - Scattered brown macules on sun exposed areas.  - There are waxy  stuck on tan to brown papules on the trunk and extremities.  - No other lesions of concern on areas examined.     Medications:  Current Outpatient Medications   Medication    acetaminophen (TYLENOL) 325 MG tablet    Alcohol Swabs PADS    aspirin (ASA) 81 MG chewable tablet    blood glucose (NO BRAND SPECIFIED) test strip    blood glucose monitoring (ACCU-CHEK FELIPE SMARTVIEW) meter device kit    blood glucose monitoring (SOFTCLIX) lancets    Calcium Carb-Cholecalciferol (CALCIUM CARBONATE-VITAMIN D3) 600-400 MG-UNIT TABS    Dulaglutide (TRULICITY) 4.5 MG/0.5ML SOPN    furosemide (LASIX) 20 MG tablet    HUMALOG KWIKPEN 100 UNIT/ML soln    insulin NPH (HUMULIN N KWIKPEN) 100 UNIT/ML injection    insulin  UNIT/ML injection    insulin pen needle (32G X 4 MM) 32G X 4 MM miscellaneous    ketorolac (ACULAR) 0.5 % ophthalmic solution    lisinopril (ZESTRIL) 10 MG tablet    magnesium oxide (MAG-OX) 400 (241.3 Mg) MG tablet    MAGNESIUM OXIDE 400 (240 Mg) MG tablet    ofloxacin (OCUFLOX) 0.3 % ophthalmic solution    order for DME    polyethylene glycol (GOLYTELY) 236 g suspension    prednisoLONE acetate (PRED FORTE) 1 % ophthalmic suspension    prednisoLONE acetate (PRED FORTE) 1 % ophthalmic suspension    prednisoLONE acetate (PRED FORTE) 1 % ophthalmic suspension    rosuvastatin (CRESTOR) 10 MG tablet    rosuvastatin (CRESTOR) 20 MG tablet    senna-docusate (SENOKOT-S/PERICOLACE) 8.6-50 MG tablet    sirolimus (GENERIC EQUIVALENT) 0.5 MG tablet    tacrolimus (GENERIC EQUIVALENT) 1 MG capsule    tamsulosin (FLOMAX) 0.4 MG capsule     Current Facility-Administered Medications   Medication    bevacizumab (AVASTIN) intravitreal inj 1.25 mg    bevacizumab (AVASTIN) intravitreal inj 1.25 mg    bevacizumab (AVASTIN) intravitreal inj 1.25 mg      Past Medical History:   Patient Active Problem List   Diagnosis    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 (H)     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)    Hyperlipidemia LDL goal <100    Hypertension goal BP (blood pressure) < 140/90    CHF (NYHA class II, ACC/AHA stage C) (H)    CKD (chronic kidney disease) stage 3, GFR 30-59 ml/min (H)    Health Care Home    Automatic implantable cardioverter-defibrillator - [employer] single lead ICD, Not Dependent    Chronic systolic heart failure (H)    Proteinuria    Vitamin D deficiency    OA (osteoarthritis) of knee    Chondromalacia of patella, unspecified laterality    Pain in joint of left shoulder    Tinnitus    Ptosis of right eyelid    Secondary renal hyperparathyroidism (H)    Cortical cataract of both eyes    Moderate nonproliferative diabetic retinopathy, without macular edema, associated with type 2 diabetes mellitus    CHANTEL (obstructive sleep apnea)- severe (AHI 30)    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, unspecified whether long term insulin use (H)    Nuclear cataract, nonsenile    Coronary artery disease involving native coronary artery of native heart without angina pectoris    Status post coronary angiogram    Dental caries    Heart transplant, orthotopic, status (H)    Heart replaced by transplant (H)    Sepsis (H)    Need for prophylactic antibiotic    Traction detachment of left retina    Immunodeficiency, unspecified (H)    CKD (chronic kidney disease) stage 4, GFR 15-29 ml/min (H)    HIT (heparin-induced thrombocytopenia) (H)    Atrial flutter, unspecified type (H)    Acute embolism and thrombosis of right axillary vein (H)    Nuclear senile cataract of right eye     Past Medical History:   Diagnosis Date    CAD (coronary artery disease)     CHF (congestive heart failure) (H)     CKD (chronic kidney disease), stage III (H)     Cortical cataract of both eyes     Diabetes (H)     Hyperlipidemia     Hypertension     Infection due to Streptococcus mitis group 09/23/2020    Ischemic cardiomyopathy      Obesity     CHANTEL (obstructive sleep apnea)     occas cpap    CHANTEL (obstructive sleep apnea)- severe (AHI 30)     Osteoarthritis

## 2023-05-10 NOTE — PATIENT INSTRUCTIONS
Patient Education     Checking for Skin Cancer  You can find cancer early by checking your skin each month. There are 3 kinds of skin cancer. They are melanoma, basal cell carcinoma, and squamous cell carcinoma. Doing monthly skin checks is the best way to find new marks or skin changes. Follow the instructions below for checking your skin.   The ABCDEs of checking moles for melanoma   Check your moles or growths for signs of melanoma using ABCDE:   Asymmetry: the sides of the mole or growth don t match  Border: the edges are ragged, notched, or blurred  Color: the color within the mole or growth varies  Diameter: the mole or growth is larger than 6 mm (size of a pencil eraser)  Evolving: the size, shape, or color of the mole or growth is changing (evolving is not shown in the images below)    Checking for other types of skin cancer  Basal cell carcinoma or squamous cell carcinoma have symptoms such as:     A spot or mole that looks different from all other marks on your skin  Changes in how an area feels, such as itching, tenderness, or pain  Changes in the skin's surface, such as oozing, bleeding, or scaliness  A sore that does not heal  New swelling or redness beyond the border of a mole    Who s at risk?  Anyone can get skin cancer. But you are at greater risk if you have:   Fair skin, light-colored hair, or light-colored eyes  Many moles or abnormal moles on your skin  A history of sunburns from sunlight or tanning beds  A family history of skin cancer  A history of exposure to radiation or chemicals  A weakened immune system  If you have had skin cancer in the past, you are at risk for recurring skin cancer.   How to check your skin  Do your monthly skin checkups in front of a full-length mirror. Check all parts of your body, including your:   Head (ears, face, neck, and scalp)  Torso (front, back, and sides)  Arms (tops, undersides, upper, and lower armpits)  Hands (palms, backs, and fingers, including  under the nails)  Buttocks and genitals  Legs (front, back, and sides)  Feet (tops, soles, toes, including under the nails, and between toes)  If you have a lot of moles, take digital photos of them each month. Make sure to take photos both up close and from a distance. These can help you see if any moles change over time.   Most skin changes are not cancer. But if you see any changes in your skin, call your doctor right away. Only he or she can diagnose a problem. If you have skin cancer, seeing your doctor can be the first step toward getting the treatment that could save your life.   [employer] last reviewed this educational content on 4/1/2019 2000-2020 [employer]. [address] [place] [address], [place] [address]. All rights reserved. This information is not intended as a substitute for professional medical care. Always follow your healthcare professional's instructions.       When should I call my doctor?  If you are worsening or not improving, please, contact us or seek urgent care as noted below.     Who should I call with questions (adults)?  [facility] (adult and pediatric): [phone]  [facility] (adult): [phone]  For urgent needs outside of business hours call the [facility] at [phone] and ask for the dermatology resident on call to be paged  If this is a medical emergency and you are unable to reach an ER, Call [phone]    Who should I call with questions (pediatric)?  [facility]- Pediatric Dermatology  Dr. Kaila Del Castillo, Dr. Dena Moses, Dr. Ashley Cantrell, AL Mitchell, Dr. Marjan Tobar, Dr. Shivani Cosme & Dr. Fracisco Urrutia  Non-urgent nurse triage line; [phone]- Katherin and Alexsandra SIMON Care Coordinatorneda Sifuentes (/Complex ) [phone]    If you need a prescription refill, please contact your pharmacy. Refills are approved or denied by our  Physicians during normal business hours, Monday through Fridays  Per office policy, refills will not be granted if you have not been seen within the past year (or sooner depending on your child's condition)    Scheduling Information:  Pediatric Appointment Scheduling and Call Center [phone]  Radiology Scheduling- [phone]  Sedation Unit Scheduling- [phone]  [place] Scheduling- General [phone]; Pediatric Dermatology [phone]  Main  Services: [phone]  Croatian: [phone]  Gabonese: [phone]  Hmong/Wallisian/Kinyarwanda: [phone]  Preadmission Nursing Department Fax Number: [phone] (Fax all pre-operative paperwork to this number)    For urgent matters arising during evenings, weekends, or holidays that cannot wait for normal business hours please call [phone] and ask for the dermatology resident on call to be paged.

## 2023-05-10 NOTE — TELEPHONE ENCOUNTER
Prior Authorization Not Needed per Insurance    Medication: Dulaglutide (TRULICITY) 4.5 MG/0.5ML SOPN--NO PA NEEDED  Insurance Company: [employer] Part D - Phone [phone] Fax [phone]  Expected CoPay:      Pharmacy Filling the Rx: [id number] - [facility] [address] AT [facility] [address] [facility]  Pharmacy Notified: Yes  Patient Notified: Yes **Instructed pharmacy to notify patient when script is ready to /ship.**

## 2023-05-10 NOTE — NURSING NOTE
Chief Complaint   Patient presents with     Skin Check     No areas of concern     Conner Dias, EMT

## 2023-05-10 NOTE — PROGRESS NOTES
[facility] Dermatology Note  Encounter Date: May 10, 2023  Office Visit     Dermatology Problem List:  1. Hx heart transplant 2/2 ischemic cardiomyopathy 7/19/20  - sirolimus, tacrolimus  ____________________________________________    Assessment & Plan:    # History of Heart solid organ transplantation in the setting of ischemic cardiomyopathy.   - We reviewed with the patient that due to the immunosuppressive medications used following transplant, solid organ transplant recipients are at a roughly 65-fold increased risk of squamous cell carcinoma, 20-fold increased risk of basal cell carcinoma, and 3.4 fold increased risk of melanoma compared to the general population.   - Based on the patient's risk factors and the Skin and Ultraviolet Neoplasia Transplant Risk Assessment Tool, the patient's risk of skin cancer following transplant is categorized as:  - Medium Risk: 5-Year Risk of 6.15% and 10-Year Risk of 13.73%  - We briefly reviewed prevention methods for reducing skin cancer after transplantation including avoidance of sun exposure, avoidance of indoor tanning beds or other indoor tanning devices, use of protective clothing (e.g. wide-brimmed hats, long sleeves, long pants), SPF 30 or greater sunscreen, and UV-protective sunglasses when outdoors.   - We discussed self skin examinations and partner-skin examinations.      # Benign lesions: Multiple benign nevi, solar lentigos, seborrheic keratoses, cherry angiomas. Explained to patient benign nature of lesion. No treatment is necessary at this time unless the lesion changes or becomes symptomatic.   - ABCDs of melanoma were discussed and self skin checks were advised.  - Sun precaution was advised including the use of sun screens of SPF 30 or higher, sun protective clothing, and avoidance of tanning beds.     Procedures Performed:   None.     Follow-up: 1 year(s) in-person, or earlier for new or changing lesions    Staff and Scribe:      Scribe Disclosure:  I, Francheska Hussein, am serving as a scribe to document services personally performed by Cristian Delacruz MD based on data collection and the provider's statements to me.     Provider Disclosure:   The documentation recorded by the scribe accurately reflects the services I personally performed and the decisions made by me.    Cristian Delacruz MD    Department of Dermatology  [facility]: Phone: [phone], Fax:[phone]  [facility]: Phone: [phone] Fax: [phone]  ____________________________________________    CC: Skin Check (No areas of concern)    HPI:  Mr. Lucian Oliveira is a(n) 67 year old male who presents today as a return patient for FBSE. Last seen in dermatology by me on 2/16/22, at which time no spots of concern were noted.     Today, patient reports no specific spots of concern today. He does not use sunscreen. He wears protective sun clothing     Patient is otherwise feeling well, without additional skin concerns.    Labs Reviewed:  N/A    Physical Exam:  Vitals: There were no vitals taken for this visit.  SKIN: Total skin excluding the undergarment areas was performed. The exam included the head/face, neck, both arms, chest, back, abdomen, both legs, digits and/or nails.   - There are dome shaped bright red papules on the trunk and extremities.   - Multiple regular brown pigmented macules and papules are identified on the trunk and extremities.   - Scattered brown macules on sun exposed areas.  - There are waxy stuck on tan to brown papules on the trunk and extremities.  - No other lesions of concern on areas examined.     Medications:  Current Outpatient Medications   Medication     acetaminophen (TYLENOL) 325 MG tablet     Alcohol Swabs PADS     aspirin (ASA) 81 MG chewable tablet     blood glucose (NO BRAND SPECIFIED) test strip      blood glucose monitoring (ACCU-CHEK FELIPE SMARTVIEW) meter device kit     blood glucose monitoring (SOFTCLIX) lancets     Calcium Carb-Cholecalciferol (CALCIUM CARBONATE-VITAMIN D3) 600-400 MG-UNIT TABS     Dulaglutide (TRULICITY) 4.5 MG/0.5ML SOPN     furosemide (LASIX) 20 MG tablet     HUMALOG KWIKPEN 100 UNIT/ML soln     insulin NPH (HUMULIN N KWIKPEN) 100 UNIT/ML injection     insulin  UNIT/ML injection     insulin pen needle (32G X 4 MM) 32G X 4 MM miscellaneous     ketorolac (ACULAR) 0.5 % ophthalmic solution     lisinopril (ZESTRIL) 10 MG tablet     magnesium oxide (MAG-OX) 400 (241.3 Mg) MG tablet     MAGNESIUM OXIDE 400 (240 Mg) MG tablet     ofloxacin (OCUFLOX) 0.3 % ophthalmic solution     order for DME     polyethylene glycol (GOLYTELY) 236 g suspension     prednisoLONE acetate (PRED FORTE) 1 % ophthalmic suspension     prednisoLONE acetate (PRED FORTE) 1 % ophthalmic suspension     prednisoLONE acetate (PRED FORTE) 1 % ophthalmic suspension     rosuvastatin (CRESTOR) 10 MG tablet     rosuvastatin (CRESTOR) 20 MG tablet     senna-docusate (SENOKOT-S/PERICOLACE) 8.6-50 MG tablet     sirolimus (GENERIC EQUIVALENT) 0.5 MG tablet     tacrolimus (GENERIC EQUIVALENT) 1 MG capsule     tamsulosin (FLOMAX) 0.4 MG capsule     Current Facility-Administered Medications   Medication     bevacizumab (AVASTIN) intravitreal inj 1.25 mg     bevacizumab (AVASTIN) intravitreal inj 1.25 mg     bevacizumab (AVASTIN) intravitreal inj 1.25 mg      Past Medical History:   Patient Active Problem List   Diagnosis     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 (H)     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)     Hyperlipidemia LDL goal <100     Hypertension goal BP (blood pressure) < 140/90     CHF (NYHA class II, ACC/AHA stage C) (H)     CKD (chronic kidney disease) stage 3, GFR 30-59 ml/min (H)     Health Care Home     Automatic implantable  cardioverter-defibrillator - [employer] single lead ICD, Not Dependent     Chronic systolic heart failure (H)     Proteinuria     Vitamin D deficiency     OA (osteoarthritis) of knee     Chondromalacia of patella, unspecified laterality     Pain in joint of left shoulder     Tinnitus     Ptosis of right eyelid     Secondary renal hyperparathyroidism (H)     Cortical cataract of both eyes     Moderate nonproliferative diabetic retinopathy, without macular edema, associated with type 2 diabetes mellitus     CHANTEL (obstructive sleep apnea)- severe (AHI 30)     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, unspecified whether long term insulin use (H)     Nuclear cataract, nonsenile     Coronary artery disease involving native coronary artery of native heart without angina pectoris     Status post coronary angiogram     Dental caries     Heart transplant, orthotopic, status (H)     Heart replaced by transplant (H)     Sepsis (H)     Need for prophylactic antibiotic     Traction detachment of left retina     Immunodeficiency, unspecified (H)     CKD (chronic kidney disease) stage 4, GFR 15-29 ml/min (H)     HIT (heparin-induced thrombocytopenia) (H)     Atrial flutter, unspecified type (H)     Acute embolism and thrombosis of right axillary vein (H)     Nuclear senile cataract of right eye     Past Medical History:   Diagnosis Date     CAD (coronary artery disease)      CHF (congestive heart failure) (H)      CKD (chronic kidney disease), stage III (H)      Cortical cataract of both eyes      Diabetes (H)      Hyperlipidemia      Hypertension      Infection due to Streptococcus mitis group 09/23/2020     Ischemic cardiomyopathy      Obesity      CHANTEL (obstructive sleep apnea)     occas cpap     CHANTEL (obstructive sleep apnea)- severe (AHI 30)      Osteoarthritis

## 2023-05-11 RX ORDER — NALOXONE HYDROCHLORIDE 0.4 MG/ML
0.2 INJECTION, SOLUTION INTRAMUSCULAR; INTRAVENOUS; SUBCUTANEOUS
Status: CANCELLED | OUTPATIENT
Start: 2023-05-11

## 2023-05-11 RX ORDER — PROCHLORPERAZINE MALEATE 5 MG
5 TABLET ORAL EVERY 6 HOURS PRN
Status: CANCELLED | OUTPATIENT
Start: 2023-05-11

## 2023-05-11 RX ORDER — ONDANSETRON 2 MG/ML
4 INJECTION INTRAMUSCULAR; INTRAVENOUS EVERY 6 HOURS PRN
Status: CANCELLED | OUTPATIENT
Start: 2023-05-11

## 2023-05-11 RX ORDER — NALOXONE HYDROCHLORIDE 0.4 MG/ML
0.4 INJECTION, SOLUTION INTRAMUSCULAR; INTRAVENOUS; SUBCUTANEOUS
Status: CANCELLED | OUTPATIENT
Start: 2023-05-11

## 2023-05-11 RX ORDER — FLUMAZENIL 0.1 MG/ML
0.2 INJECTION, SOLUTION INTRAVENOUS
Status: CANCELLED | OUTPATIENT
Start: 2023-05-11 | End: 2023-05-12

## 2023-05-11 RX ORDER — ONDANSETRON 4 MG/1
4 TABLET, ORALLY DISINTEGRATING ORAL EVERY 6 HOURS PRN
Status: CANCELLED | OUTPATIENT
Start: 2023-05-11

## 2023-05-12 ENCOUNTER — HOSPITAL ENCOUNTER (OUTPATIENT)
Facility: CLINIC | Age: 68
Discharge: HOME | End: 2023-05-12
Attending: INTERNAL MEDICINE | Admitting: INTERNAL MEDICINE
Payer: COMMERCIAL

## 2023-05-12 VITALS
DIASTOLIC BLOOD PRESSURE: 71 MMHG | SYSTOLIC BLOOD PRESSURE: 126 MMHG | RESPIRATION RATE: 16 BRPM | HEART RATE: 86 BPM | OXYGEN SATURATION: 96 %

## 2023-05-12 LAB
COLONOSCOPY: NORMAL
GLUCOSE BLDC GLUCOMTR-MCNC: 103 MG/DL (ref 70–99)

## 2023-05-12 PROCEDURE — 45378 DIAGNOSTIC COLONOSCOPY: CPT | Performed by: INTERNAL MEDICINE

## 2023-05-12 PROCEDURE — G0105 COLORECTAL SCRN; HI RISK IND: HCPCS | Performed by: INTERNAL MEDICINE

## 2023-05-12 PROCEDURE — 99153 MOD SED SAME PHYS/QHP EA: CPT | Performed by: INTERNAL MEDICINE

## 2023-05-12 PROCEDURE — 999N000127 HC STATISTIC PERIPHERAL IV START W US GUIDANCE

## 2023-05-12 PROCEDURE — 250N000011 HC RX IP 250 OP 636: Performed by: INTERNAL MEDICINE

## 2023-05-12 PROCEDURE — G0500 MOD SEDAT ENDO SERVICE >5YRS: HCPCS | Performed by: INTERNAL MEDICINE

## 2023-05-12 PROCEDURE — 82962 GLUCOSE BLOOD TEST: CPT

## 2023-05-12 RX ORDER — FENTANYL CITRATE 50 UG/ML
INJECTION, SOLUTION INTRAMUSCULAR; INTRAVENOUS PRN
Status: DISCONTINUED | OUTPATIENT
Start: 2023-05-12 | End: 2023-05-12 | Stop reason: HOSPADM

## 2023-05-12 RX ORDER — ONDANSETRON 2 MG/ML
4 INJECTION INTRAMUSCULAR; INTRAVENOUS
Status: DISCONTINUED | OUTPATIENT
Start: 2023-05-12 | End: 2023-05-12 | Stop reason: HOSPADM

## 2023-05-12 RX ORDER — LIDOCAINE 40 MG/G
CREAM TOPICAL
Status: DISCONTINUED | OUTPATIENT
Start: 2023-05-12 | End: 2023-05-12 | Stop reason: HOSPADM

## 2023-05-12 ASSESSMENT — ACTIVITIES OF DAILY LIVING (ADL): ADLS_ACUITY_SCORE: 35

## 2023-05-12 NOTE — PRE-PROCEDURE
ENDOSCOPY PRE-SEDATION H&P FOR OUTPATIENT PROCEDURES    Lucian Oliveira  [id number]  1955    Procedure: colonoscopy    Pre-procedure diagnosis: screening    Past medical history:   Past Medical History:   Diagnosis Date     CAD (coronary artery disease)      CHF (congestive heart failure) (H)      CKD (chronic kidney disease), stage III (H)      Cortical cataract of both eyes      Diabetes (H)      Hyperlipidemia      Hypertension      Infection due to Streptococcus mitis group 09/23/2020     Ischemic cardiomyopathy      Obesity      CHANTEL (obstructive sleep apnea)     occas cpap     CHANTEL (obstructive sleep apnea)- severe (AHI 30)      Osteoarthritis        Past surgical history:   Past Surgical History:   Procedure Laterality Date     CATARACT IOL, RT/LT       COLONOSCOPY N/A 8/7/2019    Procedure: COLONOSCOPY, WITH POLYPECTOMY AND BIOPSY;  Surgeon: Chauncey Morataya MD;  Location:  GI     CV ANGIOGRAM CORONARY GRAFT N/A 7/2/2020    Procedure: Angiogram Coronary Graft;  Surgeon: Alex Lantigua MD;  Location:  HEART CARDIAC CATH LAB     CV CORONARY ANGIOGRAM N/A 7/2/2020    Procedure: CV CORONARY ANGIOGRAM;  Surgeon: Alex Lantigua MD;  Location: [facility] CARDIAC CATH LAB     CV CORONARY ANGIOGRAM N/A 7/20/2021    Procedure: CV CORONARY ANGIOGRAM;  Surgeon: Raimundo Hudson MD;  Location: [facility] CARDIAC CATH LAB     CV CORONARY ANGIOGRAM N/A 9/12/2022    Procedure: Coronary Angiogram- BIPLANE;  Surgeon: Raimundo Hudson MD;  Location:  HEART CARDIAC CATH LAB     CV HEART BIOPSY N/A 7/27/2020    Procedure: Heart Biopsy;  Surgeon: Mario Burr MD;  Location: [facility] CARDIAC CATH LAB     CV HEART BIOPSY N/A 8/3/2020    Procedure: CV HEART BIOPSY;  Surgeon: Alex Lantigua MD;  Location:  HEART CARDIAC CATH LAB     CV HEART BIOPSY N/A 8/10/2020    Procedure: CV HEART BIOPSY;  Surgeon: Mario Burr MD;  Location: [facility] CARDIAC  CATH LAB     CV HEART BIOPSY N/A 8/17/2020    Procedure: CV HEART BIOPSY;  Surgeon: Raimundo Hudson MD;  [facility]     CV HEART BIOPSY N/A 8/31/2020    Procedure: CV HEART BIOPSY;  Surgeon: Moises Santos MD;  Location:  HEART CARDIAC CATH LAB     CV HEART BIOPSY N/A 9/14/2020    Procedure: CV HEART BIOPSY;  Surgeon: Raimundo Hudson MD;  [facility]     CV HEART BIOPSY N/A 9/28/2020    Procedure: CV HEART BIOPSY;  Surgeon: Raimundo Hudson MD;  Location:  HEART CARDIAC CATH LAB     CV HEART BIOPSY N/A 10/12/2020    Procedure: CV HEART BIOPSY;  Surgeon: John Stuart MD;  Location:  HEART CARDIAC CATH LAB     CV HEART BIOPSY N/A 11/10/2020    Procedure: CV HEART BIOPSY;  Surgeon: John Stuart MD;  Location:  HEART CARDIAC CATH LAB     CV HEART BIOPSY N/A 12/7/2020    Procedure: CV HEART BIOPSY;  Surgeon: Raimundo Hudson MD;  Location:  HEART CARDIAC CATH LAB     CV HEART BIOPSY N/A 1/5/2021    Procedure: CV HEART BIOPSY;  Surgeon: Raimundo Hudson MD;  Location:  HEART CARDIAC CATH LAB     CV HEART BIOPSY N/A 7/20/2021    Procedure: CV HEART BIOPSY;  Surgeon: Raimundo Hudson MD;  Location:  HEART CARDIAC CATH LAB     CV HEART BIOPSY N/A 9/28/2021    Procedure: CV HEART BIOPSY;  Surgeon: Raimundo Hudson MD;  Location:  HEART CARDIAC CATH LAB     CV HEART BIOPSY N/A 9/12/2022    Procedure: Heart Biopsy;  Surgeon: Raimundo Hudson MD;  Location:  HEART CARDIAC CATH LAB     CV INTRA AORTIC BALLOON N/A 7/14/2020    Procedure: RHC WITH LEAVE IN SWAN/IABP;  Surgeon: Sonny Saleh MD;  Location:  HEART CARDIAC CATH LAB     CV INTRAVASULAR ULTRASOUND N/A 9/12/2022    Procedure: Intravascular Ultrasound;  Surgeon: Raimundo Hudson MD;  [facility]     CV RIGHT HEART CATH MEASUREMENTS RECORDED  N/A 3/25/2019    Procedure: CV RIGHT HEART CATH;  Surgeon: Moises Santos MD;  [facility]     CV RIGHT HEART CATH MEASUREMENTS RECORDED N/A 7/10/2019    Procedure: CV RIGHT HEART CATH;  Surgeon: Jak Mccabe MD;  [facility]     CV RIGHT HEART CATH MEASUREMENTS RECORDED N/A 7/8/2020    Procedure: Right Heart Cath with Leave In [place] already has ICU load;  Surgeon: Jak Mccabe MD;  [facility]     CV RIGHT HEART CATH MEASUREMENTS RECORDED N/A 7/14/2020    Procedure: CV RIGHT HEART CATH;  Surgeon: Sonny Saleh MD;  [facility]     CV RIGHT HEART CATH MEASUREMENTS RECORDED N/A 7/2/2020    Procedure: CV RIGHT HEART CATH;  Surgeon: Alex Lantigua MD;  Location:  HEART CARDIAC CATH LAB     CV RIGHT HEART CATH MEASUREMENTS RECORDED N/A 7/27/2020    Procedure: Right Heart Cath;  Surgeon: Mario Burr MD;  [facility]     CV RIGHT HEART CATH MEASUREMENTS RECORDED N/A 8/3/2020    Procedure: Right Heart Cath;  Surgeon: Alex Lantigua MD;  Location:  HEART CARDIAC CATH LAB     CV RIGHT HEART CATH MEASUREMENTS RECORDED N/A 8/10/2020    Procedure: CV RIGHT HEART CATH;  Surgeon: Mairo Burr MD;  Location:  HEART CARDIAC CATH LAB     CV RIGHT HEART CATH MEASUREMENTS RECORDED N/A 8/17/2020    Procedure: CV RIGHT HEART CATH;  Surgeon: Raimundo Hudson MD;  [facility]     CV RIGHT HEART CATH MEASUREMENTS RECORDED N/A 8/31/2020    Procedure: CV RIGHT HEART CATH;  Surgeon: Moises Santos MD;  Location:  HEART CARDIAC CATH LAB     CV RIGHT HEART CATH MEASUREMENTS RECORDED N/A 9/14/2020    Procedure: CV RIGHT HEART CATH;  Surgeon: Raimundo Hudson MD;  [facility]     CV RIGHT HEART CATH MEASUREMENTS RECORDED N/A 9/28/2020    Procedure: CV RIGHT HEART CATH;  Surgeon: Raimundo Hudson  MD Dayron;  Location:  HEART CARDIAC CATH LAB     CV RIGHT HEART CATH MEASUREMENTS RECORDED N/A 10/12/2020    Procedure: CV RIGHT HEART CATH;  Surgeon: John Stuart MD;  Location:  HEART CARDIAC CATH LAB     CV RIGHT HEART CATH MEASUREMENTS RECORDED N/A 11/10/2020    Procedure: CV RIGHT HEART CATH;  Surgeon: John Stuart MD;  Location:  HEART CARDIAC CATH LAB     CV RIGHT HEART CATH MEASUREMENTS RECORDED N/A 12/7/2020    Procedure: CV RIGHT HEART CATH;  Surgeon: Raimundo Hudson MD;  Location:  HEART CARDIAC CATH LAB     CV RIGHT HEART CATH MEASUREMENTS RECORDED N/A 1/5/2021    Procedure: CV RIGHT HEART CATH;  Surgeon: Raimundo Hudson MD;  Location:  HEART CARDIAC CATH LAB     CV RIGHT HEART CATH MEASUREMENTS RECORDED N/A 7/20/2021    Procedure: CV RIGHT HEART CATH;  Surgeon: Raimundo Hudson MD;  Location:  HEART CARDIAC CATH LAB     CV RIGHT HEART CATH MEASUREMENTS RECORDED N/A 9/28/2021    Procedure: CV RIGHT HEART CATH;  Surgeon: Raimundo Hudson MD;  Location:  HEART CARDIAC CATH LAB     CV RIGHT HEART CATH MEASUREMENTS RECORDED N/A 9/12/2022    Procedure: Right Heart Catheterization;  Surgeon: Raimundo Hudson MD;  Location:  HEART CARDIAC CATH LAB     EXTRACTION(S) DENTAL Left 7/13/2020    Procedure: EXTRACTION, TOOTH #11, 12, 13, 15, and 29;  Surgeon: Monica Chao DDS;  [facility]     IMPLANT AUTOMATIC IMPLANTABLE CARDIOVERTER DEFIBRILLATOR       INCISION AND DRAINAGE STERNUM W/ IRRIGATION SYSTEM, COMBINED N/A 9/23/2020    Procedure: INCISION AND DRAINAGE, LEFT SUPRACLAVICULAR WOUND INFECTION AND ABDOMENN WOUND.;  Surgeon: Ran Huertas MD;  [facility]     INSERT INTRAAORTIC BALLOON PUMP N/A 7/16/2020    Procedure: Subclavian Intra-Aortic Balloon Pump Placement;  Surgeon: Allan Sparrow MD;  [facility]     IRRIGATION AND DEBRIDEMENT CHEST WASHOUT, COMBINED N/A 9/28/2020    Procedure:  IRRIGATION AND DEBRIDEMENT OF LEFT UPPER CHEST WOUND.;  Surgeon: Ran Huertas MD;  [facility]     PHACOEMULSIFICATION CLEAR CORNEA WITH STANDARD INTRAOCULAR LENS IMPLANT Right 2/6/2023    Procedure: RIGHT EYE PHACOEMULSIFICATION, CATARACT, WITH INTRAOCULAR LENS IMPLANT with trypan blue;  Surgeon: Triny Bunch MD;  [facility]     PHACOEMULSIFICATION CLEAR CORNEA WITH STANDARD IOL, VITRECTOMY PARSPLANA 25 GAGUE, COMBINED Left 12/10/2019    Procedure: PHACOEMULSIFICATION, CATARACT, CLEAR CORNEAL INCISION APPROACH, W STD INTRAOCULAR LENS IMPLANT INSERT + VITRECTOMY BY PARS PLANA  USING 25-GAUGE INSTRUMENTS. ENDOLASER, INFUSION OF 20% SF6 GAS;  Surgeon: Triny Bunch MD;  [facility]     PICC DOUBLE LUMEN PLACEMENT Left 07/28/2020    5Fr - 42cm, Basilic vein, mid SVC     PICC INSERTION Right 07/11/2020    basilic 44 cm total      TRANSPLANT HEART RECIPIENT N/A 7/19/2020    Procedure: REDO MEDIAN STERNOTOMY, TRANSPLANT, ORTHOTOPIC HEART, RECIPIENT, ON PUMP OXYGENATOR, REMOVAL OF CARDIAC DEFIBRILLATOR AND LEAD;  Surgeon: Griselli, Massimo, MD;  [facility]     VITRECTOMY, PARS PLANA APPROACH, USING 27-GAUGE INSTRUMENTS Left 12/21/2020    Procedure: 27 gauge pars plana vitectomy, membrane peel, perfluoroctane liquid (PFO), retinectomy, endolaser, Silicone Oil 1000 cs;  Surgeon: Triny Bunch MD;  [facility]     C CABG, ARTERY-VEIN, THREE  02/2008       Current Facility-Administered Medications   Medication     lidocaine (LMX4) cream     lidocaine 1 % 0.1-1 mL     ondansetron (ZOFRAN) injection 4 mg     sodium chloride (PF) 0.9% PF flush 3 mL     sodium chloride (PF) 0.9% PF flush 3 mL       Allergies   Allergen Reactions     Heparin Heparin Induced Thrombocytopenia     HIT screen sent 7/18/2020. CORRINE confirmed positive       History of Anesthesia/Sedation Problems: no    PHYSICAL EXAMINATION:  Constitutional: aaox3, cooperative, pleasant  Vitals reviewed: /80    Pulse 96   Resp 16   SpO2 100%   Wt:   Wt Readings from Last 2 Encounters:   02/14/23 82.7 kg (182 lb 4.8 oz)   02/06/23 81.5 kg (179 lb 10.8 oz)      Eyes: Sclera anicteric/injected  Ears/nose/mouth/throat: Normal oropharynx without ulcers or exudate, mucus membranes moist, hearing intact  Neck: supple, thyroid normal size  CV: No edema  Respiratory: Unlabored breathing  Lymph: No submandibular, supraclavicular or inguinal lymphadenopathy  Abd: Nondistended, no masses, nontender  Skin: warm, perfused, no jaundice  Psych: Normal affect  MSK: normal movement on limited exam.    ASA Score: See Provation note    Assessment/Plan:     The patient is an appropriate candidate to receive sedation.    Informed consent was discussed with the patient/family, including the risks, benefits, potential complications and any alternative options associated with sedation.    Patient assessment completed just prior to sedation and while under constant observation by the provider. Condition determined to be adequate for proceeding with sedation.    The specific risks for the procedure were discussed with the patient at the time of informed consent and include but are not limited to perforation which could require surgery, missing significant neoplasm or lesion, hemorrhage and adverse sedative complication.      Mariano Velasquez MD

## 2023-05-12 NOTE — OR NURSING
Pt had colonoscopy under moderate sedation. Pt tolerated procedure well. Pt taken to 3C recovery in stable condition on RA, procedural report to Nubia SIMON.

## 2023-05-15 NOTE — PROGRESS NOTES
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 He is here for follow-up of diabetes.      He is  s/p heart transplant on 7/19/2020 and left eye retinectomy on 12/21/20.     His diabetes is also complicated by proliferative diabetic retinopathy.  He had vitreous hemorrhage in his right eye in April 2021, resolved with last atorvastatin injection 8/4/2022.  Also has undergone PRP in his right eye on 8/24 2022.  His left eye also has proliferative diabetic retinopathy and he had his last Avastin injection in June 2022 was advised to come back in October of 2022 follow-up.     I last met with Lucian in January 2023 and we reduced his Trulicity from 3 to 4.5 mg weekly.  I also considered that if his GFR remains above 30 I would like to start him on SGLT2 inhibitor therapy.      Interim: He has had a colonoscopy, continues to work with ophthalmology to address multifactorial eye disease, and stop nephrologist Dr. Sanya benitez who noted that creatinine is up to 2.2 and then he was started on lisinopril in October 2022.  His proteinuria has improved to now low-grade albuminuria.  She is also considering SGLT2 inhibitor therapy.    He currently has labs and cardiology follow-up scheduled in July, nephrology follow-up scheduled in October.      Today:  Hasn't had Trulicity since early April.  His hunger is increased he is eating more his blood sugars are higher.  They continue to give NPH twice daily with Humalog at lunch.  Especially they have noted his fasting blood sugars are higher  as he is eating more late at night.  Otherwise he is feeling good they are out walking 45 to 60 minutes every day.  He does not think that Dr. Shelton talked with them about Empagliflozin.      Current Diabetes Medications:  Hasn't had Trulicity since early April  NPH 40 qam, and sliding scale before dinner.    Humalog 8-14 units with lunch.    he is no longer on any steroids.  He continues to take tacrolimus and sirolimus antirejection medications.    We reviewed glucometer/CGMS data together.  It revealed:    5/7/23  178  202  216    5/8/23  198  307  216    5/9/23  212  209  198    5/10/23  159  236  301    5/11/23  164  146  99    5/12/23  84  106  224    5/13/23  178  265  258    5/14/23  150  260  202    5/15/23  146  156  150    5/16/23  176  114  165    5/17/23  84  267  241    5/18/23  157  236  276    5/19/23  175  199  130    5/20/23  209  267  180    5/21/23  319  257  190    5/22/23  122        History of Diabetes monitoring and complications/ prevention:  CAD: Yes  Last eye exam results: Seen ophthalmology every 6 weeks   last dental exam: Reports UTD  Kidneys: Chronic kidney disease seeing Dr. Shelton.  HTN: Yes  On Statin: Yes  On Aspirin: Yes  Depression: Denies.  ED: yes, limited concerned at this time Lucian's PMH, PSH and allergies were reviewed today and pertinent information is summarized above.    Lucian's pertinent social and family history are also reviewed today and pertinent information is summarized above.      Current Outpatient Medications   Medication     acetaminophen (TYLENOL) 325 MG tablet     Alcohol Swabs PADS     aspirin (ASA) 81 MG chewable tablet     blood glucose (NO BRAND SPECIFIED) test strip     blood glucose monitoring (ACCU-CHEK FELIPE SMARTVIEW) meter device kit     blood glucose monitoring (SOFTCLIX) lancets     Calcium Carb-Cholecalciferol (CALCIUM CARBONATE-VITAMIN D3) 600-400 MG-UNIT TABS     Dulaglutide (TRULICITY) 4.5 MG/0.5ML SOPN     furosemide (LASIX) 20 MG  "tablet     HUMALOG KWIKPEN 100 UNIT/ML soln     insulin NPH (HUMULIN N KWIKPEN) 100 UNIT/ML injection     insulin  UNIT/ML injection     insulin pen needle (32G X 4 MM) 32G X 4 MM miscellaneous     ketorolac (ACULAR) 0.5 % ophthalmic solution     lisinopril (ZESTRIL) 10 MG tablet     magnesium oxide (MAG-OX) 400 (241.3 Mg) MG tablet     MAGNESIUM OXIDE 400 (240 Mg) MG tablet     ofloxacin (OCUFLOX) 0.3 % ophthalmic solution     order for DME     polyethylene glycol (GOLYTELY) 236 g suspension     prednisoLONE acetate (PRED FORTE) 1 % ophthalmic suspension     prednisoLONE acetate (PRED FORTE) 1 % ophthalmic suspension     prednisoLONE acetate (PRED FORTE) 1 % ophthalmic suspension     rosuvastatin (CRESTOR) 10 MG tablet     rosuvastatin (CRESTOR) 20 MG tablet     senna-docusate (SENOKOT-S/PERICOLACE) 8.6-50 MG tablet     sirolimus (GENERIC EQUIVALENT) 0.5 MG tablet     tacrolimus (GENERIC EQUIVALENT) 1 MG capsule     tamsulosin (FLOMAX) 0.4 MG capsule     Current Facility-Administered Medications   Medication     bevacizumab (AVASTIN) intravitreal inj 1.25 mg     bevacizumab (AVASTIN) intravitreal inj 1.25 mg     bevacizumab (AVASTIN) intravitreal inj 1.25 mg         ROS:   Reports good physical activity tolerance.  Denies any pedal lesions or vision changes or concerns. Denies any other acute concerns except as noted above.      Exam:    There were no vitals taken for this visit.   deferred by patient and wife.  General: Pleasant, overweight male in NAD.  He is seated at home in his kitchen with wife Shivani.  Psych:  Mood is \"good,\" affect is appropriate.  Thought form and content are fluid and coherent.    HEENT: Eyes and sclera are clear. Extraocular movements are grossly intact without proptosis.  Nares are patent, mucous membranes moist.  Neck: No masses or JVD are noted.    Resp: Easy and unlabored breathing.   Neuro: Alert and oriented, communicating clearly.   Ext: no swelling or " edema.  Good distal pulses and capillary refill in digits.  Skin in good condition.  Sensation is intact to monofilament testing bilaterally and throughout.    Data:      Recent Labs   Lab Test 05/03/23  0844 02/14/23  0821 02/02/23  1121 10/06/22  1320 09/12/22  0855 09/12/22  0854 11/30/21  0859 11/17/21  1126 08/10/20  0845 08/05/20  0843 07/07/20  1732 07/07/20  0720 10/16/18  0904 10/03/18  0725 08/16/18  0834 08/06/18  0000 04/30/18  1148 04/30/18  0000   A1C  --   --  8.3*  --  8.4*  --    < >  --    < >  --   --   --    < >  --   --   --   --   --    HEMOGLOBINA1  --   --   --   --   --   --   --   --   --   --   --   --   --   --   --  8.3*  --  10.6*   TSH  --   --   --   --   --   --   --   --   --  0.80  --  1.30   < >  --   --   --   --   --    LDL  --   --  14  --   --  38  --   --    < >  --   --   --    < >  --    < >  --   --   --    HDL  --   --  32*  --   --  32*  --   --    < >  --   --   --    < >  --    < >  --   --   --    TRIG  --   --  365*  --   --  223*  --   --    < >  --   --   --    < >  --    < >  --   --   --    CR 2.23*  2.23* 2.28* 2.26*   < >  --  1.75*   < >  --    < > 1.70*   < >  --    < >  --    < >  --    < >  --    MICROL  --   --   --   --   --   --   --  146  --   --   --   --   --  6  --   --   --   --    AST  --   --  23  --   --  30   < >  --    < >  --    < >  --    < >  --    < >  --   --   --    ALT  --   --  19  --   --  23   < >  --    < >  --    < >  --    < >  --    < >  --   --   --     < > = values in this interval not displayed.       Microalbuminuria:  Lab Results   Component Value Date    UMALCR 122.69 (H) 11/17/2021    UMALCR 3.74 10/03/2018       Most recent GFRs:    Lab Results   Component Value Date    GFRESTIMATED 31 (L) 05/03/2023    GFRESTIMATED 31 (L) 05/03/2023    GFRESTIMATED 30 (L) 02/14/2023    GFRESTBLACK 43 (L) 05/11/2021    GFRESTBLACK 43 (L) 03/02/2021    GFRESTBLACK 48 (L) 01/14/2021       No results found for: CPEPT, GADAB, ISCAB  FIB-4  Calculation: 2.13 at 2/14/2023  8:21 AM  Calculated from:  SGOT/AST: 23 U/L at 2/2/2023 11:21 AM  SGPT/ALT: 19 U/L at 2/2/2023 11:21 AM  Platelets: 171 10e3/uL at 2/14/2023  8:21 AM  Age: 67 years  AST   Date Value Ref Range Status   02/02/2023 23 10 - 50 U/L Final   05/11/2021 <3 0 - 45 U/L Final     Lab Results   Component Value Date    ALT 19 02/02/2023    ALT 18 05/11/2021         Most recent eye exam date: : Not Found       Assessment/Plan:    Lucian Oliveira is a 67 year old male who underwent cardiac transplant in July 2020 and has type 2 diabetes that often has been uncontrolled and is complicated by proliferative retinopathy, and chronic kidney disease.    1.  Type 2 diabetes:  Reported blood sugars are above goal.  He has been unable to procure Trulicity and I will transfer this prescription to [facility] to see if they are able to see what the difficulty is and hopefully deliver this.  Ideally would like 3-month refills.     He continues to be on NPH insulin as we had difficulty transitioning him off and so there is no longer on steroids.  3 oh Shivani agreed to come into clinic to transition to Lantus.  I will send the prescriptions to their home but they will not start them until our next in person visit.  They are instructed to bring the insulins to the visit.      I also do agree with starting empagliflozin and did send a staff message to 's Pascual and Arvin.        2. DM Complications-   He is walking every day, blood pressure is followed by nephrology and cardiology, he and Shivani try to follow a heart healthy diet per cardiac education, he is seeing ophthalmology regularly, on aspirin and statin therapies.      46 minutes spent on the date of the encounter doing chart review, history and exam, documentation, education and counseling, as well as communication and coordination of care, and further activities as noted above.      It is my privilege to be involved in the care of  the above patient.     Marisabel Prieto PA-C, MPAS  [facility]  Diabetes, Endocrinology, and Metabolism  [phone] Appointments/Nurse  [phone] pager  [phone] nurse line    This note was completed in part using Dragon voice recognition, and may contain word and grammatical errors.      Visit: 10:07 - 10:40 [facility] - patient at home.  Provider off site.

## 2023-05-19 ENCOUNTER — TELEPHONE (OUTPATIENT)
Dept: ENDOCRINOLOGY | Facility: CLINIC | Age: 68
End: 2023-05-19
Payer: COMMERCIAL

## 2023-05-19 NOTE — TELEPHONE ENCOUNTER
Called patient and left voicemail. Patient has an appointment on 5/23/23. Need to collect the last 14 days worth of blood sugar readings to prepare for patient's visit.   Nancy Huang MA

## 2023-05-22 NOTE — TELEPHONE ENCOUNTER
Outcome for 05/22/23 12:10 PM: Data obtained via phone and located below. Pt checks blood sugar readings morning, afternoon and evening. Used  id# [id number]  Nancy Huang MA    5/7/23  178  202  216    5/8/23  198  307  216    5/9/23  212  209  198    5/10/23  159  236  301    5/11/23  164  146  99    5/12/23  84  106  224    5/13/23  178  265  258    5/14/23  150  260  202    5/15/23  146  156  150    5/16/23  176  114  165    5/17/23  84  267  241    5/18/23  157  236  276    5/19/23  175  199  130    5/20/23  209  267  180    5/21/23  319  257  190    5/22/23  122

## 2023-05-23 ENCOUNTER — VIRTUAL VISIT (OUTPATIENT)
Dept: ENDOCRINOLOGY | Facility: CLINIC | Age: 68
End: 2023-05-23
Payer: COMMERCIAL

## 2023-05-23 DIAGNOSIS — Z79.4 TYPE 2 DIABETES MELLITUS WITH HYPERGLYCEMIA, WITH LONG-TERM CURRENT USE OF INSULIN (H): Primary | ICD-10-CM

## 2023-05-23 DIAGNOSIS — E11.65 TYPE 2 DIABETES MELLITUS WITH HYPERGLYCEMIA, WITH LONG-TERM CURRENT USE OF INSULIN (H): Primary | ICD-10-CM

## 2023-05-23 PROCEDURE — 99215 OFFICE O/P EST HI 40 MIN: CPT | Mod: VID | Performed by: PHYSICIAN ASSISTANT

## 2023-05-23 RX ORDER — DULAGLUTIDE 3 MG/.5ML
4.5 INJECTION, SOLUTION SUBCUTANEOUS WEEKLY
Qty: 6 ML | Refills: 3 | Status: SHIPPED | OUTPATIENT
Start: 2023-05-23 | End: 2023-05-30

## 2023-05-23 RX ORDER — INSULIN GLARGINE 100 [IU]/ML
40 INJECTION, SOLUTION SUBCUTANEOUS AT BEDTIME
Qty: 45 ML | Refills: 3 | Status: SHIPPED | OUTPATIENT
Start: 2023-05-23 | End: 2023-07-25

## 2023-05-23 RX ORDER — INSULIN LISPRO 100 [IU]/ML
5-20 INJECTION, SOLUTION INTRAVENOUS; SUBCUTANEOUS
Qty: 15 ML | Refills: 1 | Status: SHIPPED | OUTPATIENT
Start: 2023-05-23 | End: 2023-06-22

## 2023-05-23 NOTE — LETTER
5/23/2023       RE: Lucian Oliveira  [address] [place] [address]     Dear Colleague,    Thank you for referring your patient, Lucian Oliveira, to the [facility] [place] at [facility]. Please see a copy of my visit note below.             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 He is here for follow-up of diabetes.      He is  s/p heart transplant on 7/19/2020 and left eye retinectomy on 12/21/20.     His diabetes is also complicated by proliferative diabetic retinopathy.  He had vitreous hemorrhage in his right eye in April 2021, resolved with last atorvastatin injection 8/4/2022.  Also has undergone PRP in his right eye on 8/24 2022.  His left eye also has proliferative diabetic retinopathy and he had his last Avastin injection in June 2022 was advised to come back in October of 2022 follow-up.     I last met with Lucian in January 2023 and we reduced his Trulicity from 3 to 4.5 mg weekly.  I also considered that if his GFR remains above 30 I would like to start him on SGLT2 inhibitor therapy.      Interim: He has had a colonoscopy, continues to work with ophthalmology to address multifactorial eye disease, and stop nephrologist Dr. Sanya benitez who noted that creatinine is up to 2.2 and then he was started on lisinopril in October 2022.  His proteinuria has improved to now low-grade albuminuria.  She is also considering SGLT2 inhibitor therapy.    He currently has labs  and cardiology follow-up scheduled in July, nephrology follow-up scheduled in October.      Today:  Hasn't had Trulicity since early April.  His hunger is increased he is eating more his blood sugars are higher.  They continue to give NPH twice daily with Humalog at lunch.  Especially they have noted his fasting blood sugars are higher as he is eating more late at night.  Otherwise he is feeling good they are out walking 45 to 60 minutes every day.  He does not think that Dr. Shelton talked with them about Empagliflozin.      Current Diabetes Medications:  Hasn't had Trulicity since early April  NPH 40 qam, and sliding scale before dinner.    Humalog 8-14 units with lunch.    he is no longer on any steroids.  He continues to take tacrolimus and sirolimus antirejection medications.    We reviewed glucometer/CGMS data together.  It revealed:    5/7/23  178  202  216    5/8/23  198  307  216    5/9/23  212  209  198    5/10/23  159  236  301    5/11/23  164  146  99    5/12/23  84  106  224    5/13/23  178  265  258    5/14/23  150  260  202    5/15/23  146  156  150    5/16/23  176  114  165    5/17/23  84  267  241    5/18/23  157  236  276    5/19/23  175  199  130    5/20/23  209  267  180    5/21/23  319  257  190    5/22/23  122        History of Diabetes monitoring and complications/ prevention:  CAD: Yes  Last eye exam results: Seen ophthalmology every 6 weeks   last dental exam: Reports UTD  Kidneys: Chronic kidney disease seeing Dr. Shelton.  HTN: Yes  On Statin: Yes  On Aspirin: Yes  Depression: Denies.  ED: yes, limited concerned at this time Lucian's PMH, PSH and allergies were reviewed today and pertinent information is summarized above.    Lucian's pertinent social and family history are also reviewed today and pertinent information is summarized above.      Current Outpatient Medications   Medication    acetaminophen (TYLENOL) 325 MG tablet    Alcohol Swabs PADS    aspirin (ASA) 81 MG chewable tablet  "   blood glucose (NO BRAND SPECIFIED) test strip    blood glucose monitoring (ACCU-CHEK FELIPE SMARTVIEW) meter device kit    blood glucose monitoring (SOFTCLIX) lancets    Calcium Carb-Cholecalciferol (CALCIUM CARBONATE-VITAMIN D3) 600-400 MG-UNIT TABS    Dulaglutide (TRULICITY) 4.5 MG/0.5ML SOPN    furosemide (LASIX) 20 MG tablet    HUMALOG KWIKPEN 100 UNIT/ML soln    insulin NPH (HUMULIN N KWIKPEN) 100 UNIT/ML injection    insulin  UNIT/ML injection    insulin pen needle (32G X 4 MM) 32G X 4 MM miscellaneous    ketorolac (ACULAR) 0.5 % ophthalmic solution    lisinopril (ZESTRIL) 10 MG tablet    magnesium oxide (MAG-OX) 400 (241.3 Mg) MG tablet    MAGNESIUM OXIDE 400 (240 Mg) MG tablet    ofloxacin (OCUFLOX) 0.3 % ophthalmic solution    order for DME    polyethylene glycol (GOLYTELY) 236 g suspension    prednisoLONE acetate (PRED FORTE) 1 % ophthalmic suspension    prednisoLONE acetate (PRED FORTE) 1 % ophthalmic suspension    prednisoLONE acetate (PRED FORTE) 1 % ophthalmic suspension    rosuvastatin (CRESTOR) 10 MG tablet    rosuvastatin (CRESTOR) 20 MG tablet    senna-docusate (SENOKOT-S/PERICOLACE) 8.6-50 MG tablet    sirolimus (GENERIC EQUIVALENT) 0.5 MG tablet    tacrolimus (GENERIC EQUIVALENT) 1 MG capsule    tamsulosin (FLOMAX) 0.4 MG capsule     Current Facility-Administered Medications   Medication    bevacizumab (AVASTIN) intravitreal inj 1.25 mg    bevacizumab (AVASTIN) intravitreal inj 1.25 mg    bevacizumab (AVASTIN) intravitreal inj 1.25 mg         ROS:   Reports good physical activity tolerance.  Denies any pedal lesions or vision changes or concerns. Denies any other acute concerns except as noted above.      Exam:    There were no vitals taken for this visit.   deferred by patient and wife.  General: Pleasant, overweight male in NAD.  He is seated at home in his kitchen with wife Shivani.  Psych:  Mood is \"good,\" affect is appropriate.  Thought form and content are fluid and " coherent.    HEENT: Eyes and sclera are clear. Extraocular movements are grossly intact without proptosis.  Nares are patent, mucous membranes moist.  Neck: No masses or JVD are noted.    Resp: Easy and unlabored breathing.   Neuro: Alert and oriented, communicating clearly.   Ext: no swelling or edema.  Good distal pulses and capillary refill in digits.  Skin in good condition.  Sensation is intact to monofilament testing bilaterally and throughout.    Data:      Recent Labs   Lab Test 05/03/23  0844 02/14/23  0821 02/02/23  1121 10/06/22  1320 09/12/22  0855 09/12/22  0854 11/30/21  0859 11/17/21  1126 08/10/20  0845 08/05/20  0843 07/07/20  1732 07/07/20  0720 10/16/18  0904 10/03/18  0725 08/16/18  0834 08/06/18  0000 04/30/18  1148 04/30/18  0000   A1C  --   --  8.3*  --  8.4*  --    < >  --    < >  --   --   --    < >  --   --   --   --   --    HEMOGLOBINA1  --   --   --   --   --   --   --   --   --   --   --   --   --   --   --  8.3*  --  10.6*   TSH  --   --   --   --   --   --   --   --   --  0.80  --  1.30   < >  --   --   --   --   --    LDL  --   --  14  --   --  38  --   --    < >  --   --   --    < >  --    < >  --   --   --    HDL  --   --  32*  --   --  32*  --   --    < >  --   --   --    < >  --    < >  --   --   --    TRIG  --   --  365*  --   --  223*  --   --    < >  --   --   --    < >  --    < >  --   --   --    CR 2.23*  2.23* 2.28* 2.26*   < >  --  1.75*   < >  --    < > 1.70*   < >  --    < >  --    < >  --    < >  --    MICROL  --   --   --   --   --   --   --  146  --   --   --   --   --  6  --   --   --   --    AST  --   --  23  --   --  30   < >  --    < >  --    < >  --    < >  --    < >  --   --   --    ALT  --   --  19  --   --  23   < >  --    < >  --    < >  --    < >  --    < >  --   --   --     < > = values in this interval not displayed.       Microalbuminuria:  Lab Results   Component Value Date    UMALCR 122.69 (H) 11/17/2021    UMALCR 3.74 10/03/2018       Most recent  GFRs:    Lab Results   Component Value Date    GFRESTIMATED 31 (L) 05/03/2023    GFRESTIMATED 31 (L) 05/03/2023    GFRESTIMATED 30 (L) 02/14/2023    GFRESTBLACK 43 (L) 05/11/2021    GFRESTBLACK 43 (L) 03/02/2021    GFRESTBLACK 48 (L) 01/14/2021       No results found for: CPEPT, GADAB, ISCAB  FIB-4 Calculation: 2.13 at 2/14/2023  8:21 AM  Calculated from:  SGOT/AST: 23 U/L at 2/2/2023 11:21 AM  SGPT/ALT: 19 U/L at 2/2/2023 11:21 AM  Platelets: 171 10e3/uL at 2/14/2023  8:21 AM  Age: 67 years  AST   Date Value Ref Range Status   02/02/2023 23 10 - 50 U/L Final   05/11/2021 <3 0 - 45 U/L Final     Lab Results   Component Value Date    ALT 19 02/02/2023    ALT 18 05/11/2021         Most recent eye exam date: : Not Found       Assessment/Plan:    Lucian Oliveira is a 67 year old male who underwent cardiac transplant in July 2020 and has type 2 diabetes that often has been uncontrolled and is complicated by proliferative retinopathy, and chronic kidney disease.    1.  Type 2 diabetes:  Reported blood sugars are above goal.  He has been unable to procure Trulicity and I will transfer this prescription to [place] [facility] to see if they are able to see what the difficulty is and hopefully deliver this.  Ideally would like 3-month refills.     He continues to be on NPH insulin as we had difficulty transitioning him off and so there is no longer on steroids.  3 oh Shivani agreed to come into clinic to transition to Lantus.  I will send the prescriptions to their home but they will not start them until our next in person visit.  They are instructed to bring the insulins to the visit.      I also do agree with starting empagliflozin and did send a staff message to 's Pascual and Arvin.        2. DM Complications-   He is walking every day, blood pressure is followed by nephrology and cardiology, he and Shivani try to follow a heart healthy diet per cardiac education, he is seeing ophthalmology regularly, on  aspirin and statin therapies.      46 minutes spent on the date of the encounter doing chart review, history and exam, documentation, education and counseling, as well as communication and coordination of care, and further activities as noted above.      It is my privilege to be involved in the care of the above patient.     Marisabel Prieto PA-C, MPAS  [facility]  Diabetes, Endocrinology, and Metabolism  [phone] Appointments/Nurse  [phone] pager  [phone] nurse line    This note was completed in part using Dragon voice recognition, and may contain word and grammatical errors.      Visit: 10:07 - 10:40 Doxmity - patient at home.  Provider off site.

## 2023-05-23 NOTE — PATIENT INSTRUCTIONS
Voy a moo Trulicity con [facility].  Coge Lantus de la farmacia kiley no use hasta que nos vemos en persona.    Mis mejores deseos,  Marisabel

## 2023-05-23 NOTE — NURSING NOTE
Is the patient currently in the state of [place]? YES    Visit mode:VIDEO    If the visit is dropped, the patient can be reconnected by: VIDEO VISIT: Text to cell phone: [phone]    Will anyone else be joining the visit? NO      How would you like to obtain your AVS? MyChart    Are changes needed to the allergy or medication list? NO    Reason for visit: RECHECK (3 month follow up for Diabetes. )

## 2023-05-24 ENCOUNTER — TELEPHONE (OUTPATIENT)
Dept: ENDOCRINOLOGY | Facility: CLINIC | Age: 68
End: 2023-05-24
Payer: COMMERCIAL

## 2023-05-24 NOTE — TELEPHONE ENCOUNTER
Good afternoon,   We are unable to split trulicity boxes. Could we get 1 rx for the 3mg pens and a separate rx for the 4.5mg pens please?    Thank you for your time,  Tanvi Turcios PharmD  [facility]   [phone]

## 2023-05-27 ENCOUNTER — HEALTH MAINTENANCE LETTER (OUTPATIENT)
Age: 68
End: 2023-05-27

## 2023-05-30 DIAGNOSIS — E11.65 TYPE 2 DIABETES MELLITUS WITH HYPERGLYCEMIA, WITH LONG-TERM CURRENT USE OF INSULIN (H): ICD-10-CM

## 2023-05-30 DIAGNOSIS — Z79.4 TYPE 2 DIABETES MELLITUS WITH HYPERGLYCEMIA, WITH LONG-TERM CURRENT USE OF INSULIN (H): ICD-10-CM

## 2023-05-30 DIAGNOSIS — E11.3513 TYPE 2 DIABETES MELLITUS WITH PROLIFERATIVE RETINOPATHY OF BOTH EYES AND MACULAR EDEMA, UNSPECIFIED WHETHER LONG TERM INSULIN USE (H): Primary | ICD-10-CM

## 2023-05-30 RX ORDER — DULAGLUTIDE 3 MG/.5ML
3 INJECTION, SOLUTION SUBCUTANEOUS WEEKLY
Qty: 2 ML | Refills: 0 | Status: SHIPPED | OUTPATIENT
Start: 2023-05-30 | End: 2023-06-22

## 2023-05-31 DIAGNOSIS — Z79.4 TYPE 2 DIABETES MELLITUS WITH HYPERGLYCEMIA, WITH LONG-TERM CURRENT USE OF INSULIN (H): ICD-10-CM

## 2023-05-31 DIAGNOSIS — E11.65 TYPE 2 DIABETES MELLITUS WITH HYPERGLYCEMIA, WITH LONG-TERM CURRENT USE OF INSULIN (H): ICD-10-CM

## 2023-05-31 RX ORDER — DULAGLUTIDE 4.5 MG/.5ML
4.5 INJECTION, SOLUTION SUBCUTANEOUS WEEKLY
Qty: 6 ML | Refills: 1 | Status: SHIPPED | OUTPATIENT
Start: 2023-05-31 | End: 2023-11-07 | Stop reason: DRUGHIGH

## 2023-05-31 NOTE — TELEPHONE ENCOUNTER
Trulicity 4.5 mg sent to [facility] mail order [facility] . It had went to [facility] Jeimy Casas RN on 5/31/2023 at 9:24 AM

## 2023-06-02 ENCOUNTER — TELEPHONE (OUTPATIENT)
Dept: TRANSPLANT | Facility: CLINIC | Age: 68
End: 2023-06-02
Payer: COMMERCIAL

## 2023-06-02 DIAGNOSIS — Z94.1 HEART REPLACED BY TRANSPLANT (H): Primary | ICD-10-CM

## 2023-06-13 ENCOUNTER — ANCILLARY PROCEDURE (OUTPATIENT)
Dept: ULTRASOUND IMAGING | Facility: CLINIC | Age: 68
End: 2023-06-13
Attending: INTERNAL MEDICINE
Payer: COMMERCIAL

## 2023-06-13 DIAGNOSIS — N18.32 STAGE 3B CHRONIC KIDNEY DISEASE (CKD) (H): ICD-10-CM

## 2023-06-13 DIAGNOSIS — Z94.1 HEART REPLACED BY TRANSPLANT (H): ICD-10-CM

## 2023-06-13 PROCEDURE — 76770 US EXAM ABDO BACK WALL COMP: CPT | Mod: GC | Performed by: RADIOLOGY

## 2023-06-15 ENCOUNTER — OFFICE VISIT (OUTPATIENT)
Dept: OPHTHALMOLOGY | Facility: CLINIC | Age: 68
End: 2023-06-15
Attending: OPHTHALMOLOGY
Payer: COMMERCIAL

## 2023-06-15 DIAGNOSIS — E11.3513 TYPE 2 DIABETES MELLITUS WITH PROLIFERATIVE RETINOPATHY OF BOTH EYES AND MACULAR EDEMA, UNSPECIFIED WHETHER LONG TERM INSULIN USE (H): ICD-10-CM

## 2023-06-15 DIAGNOSIS — Z94.1 HEART REPLACED BY TRANSPLANT (H): ICD-10-CM

## 2023-06-15 PROCEDURE — G0463 HOSPITAL OUTPT CLINIC VISIT: HCPCS | Performed by: OPHTHALMOLOGY

## 2023-06-15 PROCEDURE — 99214 OFFICE O/P EST MOD 30 MIN: CPT | Mod: GC | Performed by: OPHTHALMOLOGY

## 2023-06-15 PROCEDURE — 92134 CPTRZ OPH DX IMG PST SGM RTA: CPT | Performed by: OPHTHALMOLOGY

## 2023-06-15 RX ORDER — MYCOPHENOLATE MOFETIL 500 MG/1
1500 TABLET ORAL 2 TIMES DAILY
Qty: 180 TABLET | Refills: 11 | Status: SHIPPED | OUTPATIENT
Start: 2023-06-15 | End: 2024-05-24

## 2023-06-15 ASSESSMENT — REFRACTION_WEARINGRX
OD_AXIS: 017
OD_CYLINDER: +1.00
OS_SPHERE: BALANCE
OD_SPHERE: -0.50
OD_ADD: +2.50

## 2023-06-15 ASSESSMENT — VISUAL ACUITY
METHOD: SNELLEN - LINEAR
OD_CC+: +2
CORRECTION_TYPE: GLASSES
OD_CC: 20/30

## 2023-06-15 ASSESSMENT — TONOMETRY
OD_IOP_MMHG: 24
OS_IOP_MMHG: 20
IOP_METHOD: TONOPEN

## 2023-06-15 ASSESSMENT — CONF VISUAL FIELD
OD_SUPERIOR_TEMPORAL_RESTRICTION: 0
OD_INFERIOR_TEMPORAL_RESTRICTION: 0
OS_SUPERIOR_TEMPORAL_RESTRICTION: 1
OS_SUPERIOR_NASAL_RESTRICTION: 3
OD_NORMAL: 1
OD_INFERIOR_NASAL_RESTRICTION: 0
OD_SUPERIOR_NASAL_RESTRICTION: 0
OS_INFERIOR_NASAL_RESTRICTION: 1
OS_INFERIOR_TEMPORAL_RESTRICTION: 1

## 2023-06-15 ASSESSMENT — CUP TO DISC RATIO: OD_RATIO: 0.25

## 2023-06-15 ASSESSMENT — EXTERNAL EXAM - LEFT EYE: OS_EXAM: NORMAL

## 2023-06-15 ASSESSMENT — SLIT LAMP EXAM - LIDS
COMMENTS: NORMAL
COMMENTS: NORMAL

## 2023-06-15 ASSESSMENT — EXTERNAL EXAM - RIGHT EYE: OD_EXAM: NORMAL

## 2023-06-15 NOTE — PROGRESS NOTES
"CC: follow up proliferative diabetic retinopathy  s/p CEIOL OD     Interval:  follow up for PDR each eye, Vitreous hemorrhage right eye, Funnel RD left eye, and Ocular Hypertension each eye. Patient feels vision is stable each eye in the last several weeks. \"I feel pain in my head and my eyes. I don't know if it is from my glasses.\" No flashes or floaters. Patient states compliant with eye drops.     Ocular meds:   - Prednisolone once daily in right eye, once every other day left eye.   - Done with ketorolac drop.       HPI: 67 year old man PMHx of CAD (s/p 3v CABG 2008), ischemic CM, now s/p orthotopic heart transplant 07/2020, CKD, HTN, HLD, obesity, CHANTEL and IDDM2 with history of PDR both eyes who presented to [facility] in 05/17/2019 with bilateral vitreous hemorrhages.      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   Left eye: Irregular retina; ERM; nasal to fovea with large bullous edema, temporal with subretinal fibrosis / trace edema, Atrophic thinned retina IT macula. stable     FA 01/25/23   right eye: PRP scars. Foci of NV superonasal.     optos consistent with exam     Assessment and Plan    # s/p CEIOL OD 2/6/23. doing well.   IOP WNL    VA improved to 20/30sc.   Optical Coherence Tomography 06/15/23 with return of mild cystoid macular edema  Can cont tapering meds slowly with monitoring.    - Done with ketorolac   - Reports taking \"brown top\" drop that he believes is prednisolone, possible that this is actually ofloxacin.    - prednisolone once a day OD   - Stop ofloxacin.    - return precautions discussed    # Proliferative Diabetic Retinopathy, right eye      # Vitreous hemorrhage, right eye -  Resolving (onset 4/2021)   - Responded to multiple Avastin, nearly resolved 08/04/2022   - R/B/A Fill-in PRP " right eye 08/24/2022: #764 spots   -  09/14/2022 last Avastin right eye     # Proliferative Diabetic Retinopathy, left eye     - New NVI, left eye 6/2/2022   - Gonio 6/2/2022 broad PAS superiorly ~3 clock hours, narrow PAS inferiorly <1 clock hour, no NVA   - Regressing 08/04/2022   - IOP 08/04/2022 15   - s/p PRP 6/5/2019   - s/p avastin last injection 06/02/2022   - 03/09/23 VA stable CF2' - continue to observe given chronic RD  # Funnel RD left eye   - progressed to funnel RD after loss to follow up given heart surgeries     - w retina folded on itself under SO   - guarded prognosis discussed   - Continue PredForte every other day left eye    # Ocuar HTN, both eyes    - 03/09/23 IOP wnl with poor drop adherence. Stopped latanoprost to simplify regimen.    - 06/15/23 IOP 24/20.   - Restart latanoprost at bedtime right eye.     PLAN:   - Restart latanoprost at bedtime right eye.   - Ensure he is taking prednisolone once daily right eye, every other day left eye (and not ofloxacin).   - Follow up in 6 weeks with Optical Coherence Tomography   - Bring all drops to next appointment.   Retina detachment precautions were discussed with the patient (presence or increased in flashes, floaters or a curtain in the visual field) and was asked to return if any of the those occur      Akbar Cabezas MD  Ophthalmology, PGY-3     ~~~~~~~~~~~~~~~~~~~~~~~~~~~~~~~~~~   Complete documentation of historical and exam elements from today's encounter can be found in the full encounter summary report (not reduplicated in this progress note).  I personally obtained the chief complaint(s) and history of present illness.  I confirmed and edited as necessary the review of systems, past medical/surgical history, family history, social history, and examination findings as documented by others; and I examined the patient myself.  I personally reviewed the relevant tests, images, and reports as documented above.  I personally reviewed the  ophthalmic test(s) associated with this encounter, agree with the interpretation(s) as documented by the resident/fellow, and have edited the corresponding report(s) as necessary.   I formulated and edited as necessary the assessment and plan and discussed the findings and management plan with the patient and family    Triny Bunch MD  Professor of Ophthalmology  Vitreo-Retinal surgeon   Department of Ophthalmology and Visual Neurosciences   [facility]  Phone: [phone]   Fax: [phone]

## 2023-06-15 NOTE — TELEPHONE ENCOUNTER
Pt called with medication instructions using .  Pt with chronically elevated creatinine.  Per Dr. Sheridan, pt to do the following:  Stop taking Tacrolimus.   Start taking Mycophenolate 1500mg BID instead of Tacro.  Continue Sirolimus daily.  Pt called with plan, and order placed for Mycophenolate.  Pt instructed to call coordinator when he received Mycophenolate in the mail to go over transition instructions.  Pt states understanding plan of care and instructions.

## 2023-06-15 NOTE — NURSING NOTE
"Chief Complaints and History of Present Illnesses   Patient presents with     Follow Up     6 week follow up for PDR each eye, Vitreous hemorrhage right eye, Funnel RD left eye, and Ocular Hypertension each eye.     Patient feels vision is stable each eye in the last several weeks. \"I feel pain in my head and my eyes. I don't know if it is from my glasses.\" Patient states compliant with eye drops.          Chief Complaint(s) and History of Present Illness(es)     Follow Up            Laterality: both eyes    Onset: weeks ago    Quality: blurred vision    Associated symptoms: Negative for flashes and floaters    Treatments tried: eye drops and glasses    Comments: 6 week follow up for PDR each eye, Vitreous hemorrhage right eye, Funnel RD left eye, and Ocular Hypertension each eye.     Patient feels vision is stable each eye in the last several weeks. \"I feel pain in my head and my eyes. I don't know if it is from my glasses.\" Patient states compliant with eye drops.               Comments    Ocular meds:   - Prednisolone every other day left eye     DM  Lab Results       Component                Value               Date                       A1C                      8.3                 02/02/2023                 A1C                      8.4                 09/12/2022                 A1C                      7.4                 03/14/2022                 A1C                      7.3                 11/17/2021                 A1C                      7.8                 10/15/2021                 A1C                      6.7                 01/14/2021                 A1C                      5.9                 12/07/2020                 A1C                      8.2                 07/03/2020                 A1C                      7.9                 07/08/2019                 A1C                      8.5                 03/11/2019                MAYE De León 10:35 AM 06/15/2023                   "

## 2023-06-15 NOTE — PATIENT INSTRUCTIONS
- Restart latanoprost (teal top) at bedtime right eye.   - Ensure he is taking prednisolone (white or pink op) once daily right eye, every other day left eye (and not ofloxacin).     - Bring all drops to next appointment.   - Follow up in 6 weeks with Optical Coherence Tomography

## 2023-06-20 ENCOUNTER — APPOINTMENT (OUTPATIENT)
Dept: INTERPRETER SERVICES | Facility: CLINIC | Age: 68
End: 2023-06-20
Payer: COMMERCIAL

## 2023-06-20 ENCOUNTER — TELEPHONE (OUTPATIENT)
Dept: TRANSPLANT | Facility: CLINIC | Age: 68
End: 2023-06-20
Payer: COMMERCIAL

## 2023-06-20 DIAGNOSIS — Z79.4 TYPE 2 DIABETES MELLITUS WITH HYPERGLYCEMIA, WITH LONG-TERM CURRENT USE OF INSULIN (H): ICD-10-CM

## 2023-06-20 DIAGNOSIS — E11.3513 TYPE 2 DIABETES MELLITUS WITH PROLIFERATIVE RETINOPATHY OF BOTH EYES AND MACULAR EDEMA, UNSPECIFIED WHETHER LONG TERM INSULIN USE (H): ICD-10-CM

## 2023-06-20 DIAGNOSIS — E11.65 TYPE 2 DIABETES MELLITUS WITH HYPERGLYCEMIA, WITH LONG-TERM CURRENT USE OF INSULIN (H): ICD-10-CM

## 2023-06-20 DIAGNOSIS — Z94.1 HEART REPLACED BY TRANSPLANT (H): Primary | ICD-10-CM

## 2023-06-20 RX ORDER — MAGNESIUM OXIDE 400 MG/1
400 TABLET ORAL 2 TIMES DAILY
Qty: 180 TABLET | Refills: 1 | Status: SHIPPED | OUTPATIENT
Start: 2023-06-20 | End: 2024-01-05

## 2023-06-20 NOTE — TELEPHONE ENCOUNTER
Pt received MMF 500mg capsules in the mail.  Pt called and instructed to do the following via :  Start taking MMF 1500mg twice a day.  Stop taking Tacrolimus.  Continue taking Sirolimus 2mg daily.  Have your labs checked in 2 weeks (CBC).  Attempted to also call pt's daughter, Elisabeth, but number is no longer in service.  Pt and wife were able to repeat back instructions via , and state understanding.

## 2023-06-22 NOTE — TELEPHONE ENCOUNTER
Short Acting Insulin Protocol Failed 06/22/2023 08:05 AM   Protocol Details  HgbA1C in past 3 or 6 months     GLP-1 Agonists Protocol Failed 06/22/2023 08:05 AM   Protocol Details  HgbA1C in past 3 or 6 months    Normal serum creatinine on file in past 12 months

## 2023-06-22 NOTE — TELEPHONE ENCOUNTER
HUMALOG KWIKPEN 100 UNIT/ML soln  Last Written Prescription Date:  5/23/23  Last Fill Quantity: 15 ml,   # refills: 1  Last Office Visit : 5/23/23  Future Office visit: NONE NOTED      Dulaglutide (TRULICITY) 3 MG/0.5ML SOPN     Last Written Prescription Date:  5/30/23  Last Fill Quantity: 2 ml,   # refills: 0      insulin NPH (HUMULIN N KWIKPEN) 100 UNIT/ML injection  Last Written Prescription Date:  4/27/23  Last Fill Quantity: 45 ml,   # refills: 3      Routing refill request to provider for review/approval because:  Insulin - refilled per clinic

## 2023-06-26 RX ORDER — INSULIN HUMAN 100 [IU]/ML
INJECTION, SUSPENSION SUBCUTANEOUS
Qty: 45 ML | Refills: 1 | Status: SHIPPED | OUTPATIENT
Start: 2023-06-26 | End: 2023-10-07

## 2023-06-26 RX ORDER — DULAGLUTIDE 3 MG/.5ML
3 INJECTION, SOLUTION SUBCUTANEOUS WEEKLY
Qty: 2 ML | Refills: 0 | Status: SHIPPED | OUTPATIENT
Start: 2023-06-26 | End: 2023-07-25

## 2023-06-26 RX ORDER — INSULIN LISPRO 100 [IU]/ML
5-20 INJECTION, SOLUTION INTRAVENOUS; SUBCUTANEOUS
Qty: 60 ML | Refills: 1 | Status: SHIPPED | OUTPATIENT
Start: 2023-06-26 | End: 2023-10-12

## 2023-06-29 NOTE — TELEPHONE ENCOUNTER
Pt asked if he is ready to titrate up to next dose via MyChart.   .Myla Perez RN on 6/29/2023 at 8:37 AM

## 2023-07-05 ENCOUNTER — MEDICAL CORRESPONDENCE (OUTPATIENT)
Dept: HEALTH INFORMATION MANAGEMENT | Facility: CLINIC | Age: 68
End: 2023-07-05
Payer: COMMERCIAL

## 2023-07-05 ENCOUNTER — TRANSFERRED RECORDS (OUTPATIENT)
Dept: HEALTH INFORMATION MANAGEMENT | Facility: CLINIC | Age: 68
End: 2023-07-05
Payer: COMMERCIAL

## 2023-07-06 DIAGNOSIS — I25.739 CORONARY ARTERY DISEASE INVOLVING NONAUTOLOGOUS BIOLOGICAL CORONARY BYPASS GRAFT WITH ANGINA PECTORIS (H): ICD-10-CM

## 2023-07-06 DIAGNOSIS — Z79.4 TYPE 2 DIABETES MELLITUS WITH HYPERGLYCEMIA, WITH LONG-TERM CURRENT USE OF INSULIN (H): Primary | ICD-10-CM

## 2023-07-06 DIAGNOSIS — E11.65 TYPE 2 DIABETES MELLITUS WITH HYPERGLYCEMIA, WITH LONG-TERM CURRENT USE OF INSULIN (H): Primary | ICD-10-CM

## 2023-07-06 DIAGNOSIS — E78.5 DYSLIPIDEMIA: ICD-10-CM

## 2023-07-10 NOTE — TELEPHONE ENCOUNTER
omega-3 acid ethyl esters (LOVAZA) 1 g capsule  Last Written Prescription Date:  ?  Last Fill Quantity: /,   # refills: ?  Last Office Visit :  5/23/2023  Future Office visit:  None  Routing refill request to provider for review/approval because:  Drug not active on patient's medication list      Claudia Edmondson RN  Central Triage Red Flags/Med Refills

## 2023-07-11 ENCOUNTER — TELEPHONE (OUTPATIENT)
Dept: TRANSPLANT | Facility: CLINIC | Age: 68
End: 2023-07-11
Payer: COMMERCIAL

## 2023-07-11 DIAGNOSIS — Z79.4 TYPE 2 DIABETES MELLITUS WITH DIABETIC NEPHROPATHY, WITH LONG-TERM CURRENT USE OF INSULIN (H): ICD-10-CM

## 2023-07-11 DIAGNOSIS — Z13.220 LIPID SCREENING: ICD-10-CM

## 2023-07-11 DIAGNOSIS — Z12.5 PROSTATE CANCER SCREENING: ICD-10-CM

## 2023-07-11 DIAGNOSIS — Z94.1 HEART REPLACED BY TRANSPLANT (H): Primary | ICD-10-CM

## 2023-07-11 DIAGNOSIS — E11.21 TYPE 2 DIABETES MELLITUS WITH DIABETIC NEPHROPATHY, WITH LONG-TERM CURRENT USE OF INSULIN (H): ICD-10-CM

## 2023-07-11 RX ORDER — LIDOCAINE 40 MG/G
CREAM TOPICAL
Status: CANCELLED | OUTPATIENT
Start: 2023-07-11

## 2023-07-11 RX ORDER — ASPIRIN 325 MG
325 TABLET ORAL ONCE
Status: CANCELLED | OUTPATIENT
Start: 2023-07-11 | End: 2023-07-11

## 2023-07-11 RX ORDER — POTASSIUM CHLORIDE 1500 MG/1
40 TABLET, EXTENDED RELEASE ORAL
Status: CANCELLED | OUTPATIENT
Start: 2023-07-11

## 2023-07-11 RX ORDER — ASPIRIN 81 MG/1
243 TABLET, CHEWABLE ORAL ONCE
Status: CANCELLED | OUTPATIENT
Start: 2023-07-11

## 2023-07-11 RX ORDER — SODIUM CHLORIDE 9 MG/ML
INJECTION, SOLUTION INTRAVENOUS CONTINUOUS
Status: CANCELLED | OUTPATIENT
Start: 2023-07-11

## 2023-07-11 RX ORDER — POTASSIUM CHLORIDE 1500 MG/1
20 TABLET, EXTENDED RELEASE ORAL
Status: CANCELLED | OUTPATIENT
Start: 2023-07-11

## 2023-07-11 NOTE — TELEPHONE ENCOUNTER
Pt called using  and reminded that he needs to have labs (CBC) drawn since switching to MMF.  Appt made for pt at [facility] at 12:30 on 7/13.  Pt states understanding.

## 2023-07-13 ENCOUNTER — LAB (OUTPATIENT)
Dept: LAB | Facility: CLINIC | Age: 68
End: 2023-07-13
Payer: COMMERCIAL

## 2023-07-13 DIAGNOSIS — Z94.1 HEART REPLACED BY TRANSPLANT (H): ICD-10-CM

## 2023-07-13 LAB
BASOPHILS # BLD AUTO: 0 10E3/UL (ref 0–0.2)
BASOPHILS NFR BLD AUTO: 0 %
EOSINOPHIL # BLD AUTO: 0.1 10E3/UL (ref 0–0.7)
EOSINOPHIL NFR BLD AUTO: 1 %
ERYTHROCYTE [DISTWIDTH] IN BLOOD BY AUTOMATED COUNT: 14.4 % (ref 10–15)
HCT VFR BLD AUTO: 33.6 % (ref 40–53)
HGB BLD-MCNC: 10.6 G/DL (ref 13.3–17.7)
IMM GRANULOCYTES # BLD: 0.1 10E3/UL
IMM GRANULOCYTES NFR BLD: 2 %
LYMPHOCYTES # BLD AUTO: 1.1 10E3/UL (ref 0.8–5.3)
LYMPHOCYTES NFR BLD AUTO: 19 %
MCH RBC QN AUTO: 28.1 PG (ref 26.5–33)
MCHC RBC AUTO-ENTMCNC: 31.5 G/DL (ref 31.5–36.5)
MCV RBC AUTO: 89 FL (ref 78–100)
MONOCYTES # BLD AUTO: 0.6 10E3/UL (ref 0–1.3)
MONOCYTES NFR BLD AUTO: 9 %
NEUTROPHILS # BLD AUTO: 4 10E3/UL (ref 1.6–8.3)
NEUTROPHILS NFR BLD AUTO: 69 %
NRBC # BLD AUTO: 0 10E3/UL
NRBC BLD AUTO-RTO: 0 /100
PLATELET # BLD AUTO: 217 10E3/UL (ref 150–450)
RBC # BLD AUTO: 3.77 10E6/UL (ref 4.4–5.9)
WBC # BLD AUTO: 5.9 10E3/UL (ref 4–11)

## 2023-07-13 PROCEDURE — 85025 COMPLETE CBC W/AUTO DIFF WBC: CPT | Performed by: PATHOLOGY

## 2023-07-13 PROCEDURE — 36415 COLL VENOUS BLD VENIPUNCTURE: CPT | Performed by: PATHOLOGY

## 2023-07-14 RX ORDER — OMEGA-3-ACID ETHYL ESTERS 1 G/1
1 CAPSULE, LIQUID FILLED ORAL DAILY
Qty: 90 CAPSULE | Refills: 3 | Status: SHIPPED | OUTPATIENT
Start: 2023-07-14 | End: 2024-03-12

## 2023-07-17 ENCOUNTER — LAB (OUTPATIENT)
Dept: LAB | Facility: CLINIC | Age: 68
End: 2023-07-17
Attending: INTERNAL MEDICINE
Payer: COMMERCIAL

## 2023-07-17 ENCOUNTER — HOSPITAL ENCOUNTER (OUTPATIENT)
Dept: GENERAL RADIOLOGY | Facility: CLINIC | Age: 68
Discharge: HOME | End: 2023-07-17
Attending: INTERNAL MEDICINE | Admitting: INTERNAL MEDICINE
Payer: COMMERCIAL

## 2023-07-17 ENCOUNTER — HOSPITAL ENCOUNTER (OUTPATIENT)
Facility: CLINIC | Age: 68
Discharge: HOME | End: 2023-07-17
Attending: INTERNAL MEDICINE | Admitting: INTERNAL MEDICINE
Payer: COMMERCIAL

## 2023-07-17 ENCOUNTER — OFFICE VISIT (OUTPATIENT)
Dept: CARDIOLOGY | Facility: CLINIC | Age: 68
End: 2023-07-17
Attending: INTERNAL MEDICINE
Payer: COMMERCIAL

## 2023-07-17 ENCOUNTER — APPOINTMENT (OUTPATIENT)
Dept: MEDSURG UNIT | Facility: CLINIC | Age: 68
End: 2023-07-17
Attending: INTERNAL MEDICINE
Payer: COMMERCIAL

## 2023-07-17 ENCOUNTER — HOSPITAL ENCOUNTER (OUTPATIENT)
Dept: CARDIOLOGY | Facility: CLINIC | Age: 68
Discharge: HOME | End: 2023-07-17
Attending: INTERNAL MEDICINE | Admitting: INTERNAL MEDICINE
Payer: COMMERCIAL

## 2023-07-17 VITALS
SYSTOLIC BLOOD PRESSURE: 121 MMHG | HEART RATE: 88 BPM | OXYGEN SATURATION: 95 % | WEIGHT: 180.3 LBS | HEIGHT: 65 IN | TEMPERATURE: 97.9 F | BODY MASS INDEX: 30.04 KG/M2 | DIASTOLIC BLOOD PRESSURE: 75 MMHG | RESPIRATION RATE: 12 BRPM

## 2023-07-17 VITALS
DIASTOLIC BLOOD PRESSURE: 78 MMHG | SYSTOLIC BLOOD PRESSURE: 127 MMHG | OXYGEN SATURATION: 97 % | WEIGHT: 181.8 LBS | HEART RATE: 94 BPM | HEIGHT: 66 IN | BODY MASS INDEX: 29.22 KG/M2

## 2023-07-17 DIAGNOSIS — N18.30 ANEMIA ASSOCIATED WITH STAGE 3 CHRONIC RENAL FAILURE (H): ICD-10-CM

## 2023-07-17 DIAGNOSIS — I50.22 CHRONIC SYSTOLIC HEART FAILURE (H): Primary | Chronic | ICD-10-CM

## 2023-07-17 DIAGNOSIS — Z79.4 TYPE 2 DIABETES MELLITUS WITH DIABETIC NEPHROPATHY, WITH LONG-TERM CURRENT USE OF INSULIN (H): ICD-10-CM

## 2023-07-17 DIAGNOSIS — Z94.1 HEART REPLACED BY TRANSPLANT (H): ICD-10-CM

## 2023-07-17 DIAGNOSIS — Z13.220 LIPID SCREENING: ICD-10-CM

## 2023-07-17 DIAGNOSIS — E11.59 TYPE 2 DIABETES MELLITUS WITH OTHER CIRCULATORY COMPLICATIONS (H): ICD-10-CM

## 2023-07-17 DIAGNOSIS — D63.1 ANEMIA ASSOCIATED WITH STAGE 3 CHRONIC RENAL FAILURE (H): ICD-10-CM

## 2023-07-17 DIAGNOSIS — Z12.5 PROSTATE CANCER SCREENING: ICD-10-CM

## 2023-07-17 DIAGNOSIS — Z79.899 ENCOUNTER FOR LONG-TERM (CURRENT) USE OF HIGH-RISK MEDICATION: ICD-10-CM

## 2023-07-17 DIAGNOSIS — E11.9 TYPE 2 DIABETES MELLITUS WITHOUT COMPLICATION, UNSPECIFIED WHETHER LONG TERM INSULIN USE (H): Primary | ICD-10-CM

## 2023-07-17 DIAGNOSIS — Z94.1 HEART REPLACED BY TRANSPLANT (H): Primary | ICD-10-CM

## 2023-07-17 DIAGNOSIS — I99.9 DECREASED CIRCULATION: ICD-10-CM

## 2023-07-17 DIAGNOSIS — E11.21 TYPE 2 DIABETES MELLITUS WITH DIABETIC NEPHROPATHY, WITH LONG-TERM CURRENT USE OF INSULIN (H): ICD-10-CM

## 2023-07-17 LAB
ALBUMIN SERPL BCG-MCNC: 4 G/DL (ref 3.5–5.2)
ALP SERPL-CCNC: 122 U/L (ref 40–129)
ALT SERPL W P-5'-P-CCNC: 17 U/L (ref 0–70)
ANION GAP SERPL CALCULATED.3IONS-SCNC: 13 MMOL/L (ref 7–15)
AST SERPL W P-5'-P-CCNC: 24 U/L (ref 0–45)
BASOPHILS # BLD AUTO: 0 10E3/UL (ref 0–0.2)
BASOPHILS NFR BLD AUTO: 0 %
BILIRUB SERPL-MCNC: 0.3 MG/DL
BUN SERPL-MCNC: 22.4 MG/DL (ref 8–23)
CALCIUM SERPL-MCNC: 9.2 MG/DL (ref 8.8–10.2)
CHLORIDE SERPL-SCNC: 103 MMOL/L (ref 98–107)
CHOLEST SERPL-MCNC: 92 MG/DL
CK SERPL-CCNC: 122 U/L (ref 39–308)
CREAT SERPL-MCNC: 1.95 MG/DL (ref 0.67–1.17)
DEPRECATED HCO3 PLAS-SCNC: 24 MMOL/L (ref 22–29)
EOSINOPHIL # BLD AUTO: 0.1 10E3/UL (ref 0–0.7)
EOSINOPHIL NFR BLD AUTO: 1 %
ERYTHROCYTE [DISTWIDTH] IN BLOOD BY AUTOMATED COUNT: 14.6 % (ref 10–15)
FERRITIN SERPL-MCNC: 123 NG/ML (ref 31–409)
GFR SERPL CREATININE-BSD FRML MDRD: 37 ML/MIN/1.73M2
GLUCOSE BLDC GLUCOMTR-MCNC: 115 MG/DL (ref 70–99)
GLUCOSE SERPL-MCNC: 143 MG/DL (ref 70–99)
HBA1C MFR BLD: 8.7 %
HCT VFR BLD AUTO: 33.1 % (ref 40–53)
HDLC SERPL-MCNC: 30 MG/DL
HGB BLD-MCNC: 10.7 G/DL (ref 13.3–17.7)
HGB BLD-MCNC: 9.6 G/DL (ref 13.3–17.7)
HOLD SPECIMEN: NORMAL
IMM GRANULOCYTES # BLD: 0.1 10E3/UL
IMM GRANULOCYTES NFR BLD: 1 %
IRON BINDING CAPACITY (ROCHE): 229 UG/DL (ref 240–430)
IRON SATN MFR SERPL: 24 % (ref 15–46)
IRON SERPL-MCNC: 56 UG/DL (ref 61–157)
LDLC SERPL CALC-MCNC: 26 MG/DL
LVEF ECHO: NORMAL
LYMPHOCYTES # BLD AUTO: 0.9 10E3/UL (ref 0.8–5.3)
LYMPHOCYTES NFR BLD AUTO: 16 %
MAGNESIUM SERPL-MCNC: 1.9 MG/DL (ref 1.7–2.3)
MCH RBC QN AUTO: 28.9 PG (ref 26.5–33)
MCHC RBC AUTO-ENTMCNC: 32.3 G/DL (ref 31.5–36.5)
MCV RBC AUTO: 90 FL (ref 78–100)
MONOCYTES # BLD AUTO: 0.6 10E3/UL (ref 0–1.3)
MONOCYTES NFR BLD AUTO: 10 %
NEUTROPHILS # BLD AUTO: 3.8 10E3/UL (ref 1.6–8.3)
NEUTROPHILS NFR BLD AUTO: 72 %
NONHDLC SERPL-MCNC: 62 MG/DL
NRBC # BLD AUTO: 0 10E3/UL
NRBC BLD AUTO-RTO: 0 /100
OXYHGB MFR BLDV: 66 % (ref 92–100)
PHOSPHATE SERPL-MCNC: 3 MG/DL (ref 2.5–4.5)
PLATELET # BLD AUTO: 185 10E3/UL (ref 150–450)
POTASSIUM SERPL-SCNC: 3.9 MMOL/L (ref 3.4–5.3)
PROT SERPL-MCNC: 6.6 G/DL (ref 6.4–8.3)
PSA SERPL DL<=0.01 NG/ML-MCNC: 0.31 NG/ML (ref 0–4.5)
RBC # BLD AUTO: 3.7 10E6/UL (ref 4.4–5.9)
SIROLIMUS BLD-MCNC: 5.6 UG/L (ref 5–15)
SODIUM SERPL-SCNC: 140 MMOL/L (ref 136–145)
TME LAST DOSE: NORMAL H
TME LAST DOSE: NORMAL H
TRIGL SERPL-MCNC: 178 MG/DL
WBC # BLD AUTO: 5.4 10E3/UL (ref 4–11)

## 2023-07-17 PROCEDURE — 93005 ELECTROCARDIOGRAM TRACING: CPT

## 2023-07-17 PROCEDURE — 250N000009 HC RX 250: Performed by: INTERNAL MEDICINE

## 2023-07-17 PROCEDURE — 85018 HEMOGLOBIN: CPT | Mod: 91

## 2023-07-17 PROCEDURE — G0103 PSA SCREENING: HCPCS | Performed by: INTERNAL MEDICINE

## 2023-07-17 PROCEDURE — C1894 INTRO/SHEATH, NON-LASER: HCPCS | Performed by: INTERNAL MEDICINE

## 2023-07-17 PROCEDURE — 80061 LIPID PANEL: CPT | Performed by: INTERNAL MEDICINE

## 2023-07-17 PROCEDURE — 99152 MOD SED SAME PHYS/QHP 5/>YRS: CPT | Mod: GC | Performed by: INTERNAL MEDICINE

## 2023-07-17 PROCEDURE — 86832 HLA CLASS I HIGH DEFIN QUAL: CPT | Performed by: INTERNAL MEDICINE

## 2023-07-17 PROCEDURE — 88307 TISSUE EXAM BY PATHOLOGIST: CPT | Mod: 26 | Performed by: PATHOLOGY

## 2023-07-17 PROCEDURE — 93505 ENDOMYOCARDIAL BIOPSY: CPT | Mod: 26 | Performed by: INTERNAL MEDICINE

## 2023-07-17 PROCEDURE — 84100 ASSAY OF PHOSPHORUS: CPT | Performed by: INTERNAL MEDICINE

## 2023-07-17 PROCEDURE — 93505 ENDOMYOCARDIAL BIOPSY: CPT | Performed by: INTERNAL MEDICINE

## 2023-07-17 PROCEDURE — 250N000013 HC RX MED GY IP 250 OP 250 PS 637: Performed by: INTERNAL MEDICINE

## 2023-07-17 PROCEDURE — 88350 IMFLUOR EA ADDL 1ANTB STN PX: CPT | Mod: TC | Performed by: INTERNAL MEDICINE

## 2023-07-17 PROCEDURE — 250N000011 HC RX IP 250 OP 636: Mod: JZ | Performed by: INTERNAL MEDICINE

## 2023-07-17 PROCEDURE — 87799 DETECT AGENT NOS DNA QUANT: CPT | Performed by: INTERNAL MEDICINE

## 2023-07-17 PROCEDURE — C1751 CATH, INF, PER/CENT/MIDLINE: HCPCS | Performed by: INTERNAL MEDICINE

## 2023-07-17 PROCEDURE — 86833 HLA CLASS II HIGH DEFIN QUAL: CPT | Performed by: INTERNAL MEDICINE

## 2023-07-17 PROCEDURE — 83735 ASSAY OF MAGNESIUM: CPT | Performed by: INTERNAL MEDICINE

## 2023-07-17 PROCEDURE — 71046 X-RAY EXAM CHEST 2 VIEWS: CPT

## 2023-07-17 PROCEDURE — 999N000128 HC STATISTIC PERIPHERAL IV START W/O US GUIDANCE

## 2023-07-17 PROCEDURE — 80195 ASSAY OF SIROLIMUS: CPT | Performed by: INTERNAL MEDICINE

## 2023-07-17 PROCEDURE — 93454 CORONARY ARTERY ANGIO S&I: CPT | Performed by: INTERNAL MEDICINE

## 2023-07-17 PROCEDURE — 71046 X-RAY EXAM CHEST 2 VIEWS: CPT | Mod: 26 | Performed by: RADIOLOGY

## 2023-07-17 PROCEDURE — 82550 ASSAY OF CK (CPK): CPT | Performed by: INTERNAL MEDICINE

## 2023-07-17 PROCEDURE — 250N000011 HC RX IP 250 OP 636: Performed by: INTERNAL MEDICINE

## 2023-07-17 PROCEDURE — 80053 COMPREHEN METABOLIC PANEL: CPT | Performed by: INTERNAL MEDICINE

## 2023-07-17 PROCEDURE — 93454 CORONARY ARTERY ANGIO S&I: CPT | Mod: 26 | Performed by: INTERNAL MEDICINE

## 2023-07-17 PROCEDURE — 36415 COLL VENOUS BLD VENIPUNCTURE: CPT | Performed by: INTERNAL MEDICINE

## 2023-07-17 PROCEDURE — 82810 BLOOD GASES O2 SAT ONLY: CPT

## 2023-07-17 PROCEDURE — 272N000001 HC OR GENERAL SUPPLY STERILE: Performed by: INTERNAL MEDICINE

## 2023-07-17 PROCEDURE — 83550 IRON BINDING TEST: CPT | Performed by: INTERNAL MEDICINE

## 2023-07-17 PROCEDURE — 86352 CELL FUNCTION ASSAY W/STIM: CPT | Performed by: INTERNAL MEDICINE

## 2023-07-17 PROCEDURE — 83036 HEMOGLOBIN GLYCOSYLATED A1C: CPT | Performed by: INTERNAL MEDICINE

## 2023-07-17 PROCEDURE — 258N000003 HC RX IP 258 OP 636: Performed by: INTERNAL MEDICINE

## 2023-07-17 PROCEDURE — 99152 MOD SED SAME PHYS/QHP 5/>YRS: CPT | Performed by: INTERNAL MEDICINE

## 2023-07-17 PROCEDURE — 93306 TTE W/DOPPLER COMPLETE: CPT | Mod: 26 | Performed by: INTERNAL MEDICINE

## 2023-07-17 PROCEDURE — 99215 OFFICE O/P EST HI 40 MIN: CPT | Mod: 25 | Performed by: INTERNAL MEDICINE

## 2023-07-17 PROCEDURE — 82728 ASSAY OF FERRITIN: CPT | Performed by: INTERNAL MEDICINE

## 2023-07-17 PROCEDURE — 85025 COMPLETE CBC W/AUTO DIFF WBC: CPT | Performed by: INTERNAL MEDICINE

## 2023-07-17 PROCEDURE — 93456 R HRT CORONARY ARTERY ANGIO: CPT | Performed by: INTERNAL MEDICINE

## 2023-07-17 PROCEDURE — 93306 TTE W/DOPPLER COMPLETE: CPT

## 2023-07-17 PROCEDURE — G0463 HOSPITAL OUTPT CLINIC VISIT: HCPCS | Mod: 25 | Performed by: INTERNAL MEDICINE

## 2023-07-17 PROCEDURE — 999N000054 HC STATISTIC EKG NON-CHARGEABLE

## 2023-07-17 PROCEDURE — 88346 IMFLUOR 1ST 1ANTB STAIN PX: CPT | Mod: 26 | Performed by: PATHOLOGY

## 2023-07-17 PROCEDURE — 999N000142 HC STATISTIC PROCEDURE PREP ONLY

## 2023-07-17 PROCEDURE — 82962 GLUCOSE BLOOD TEST: CPT

## 2023-07-17 PROCEDURE — 999N000134 HC STATISTIC PP CARE STAGE 3

## 2023-07-17 PROCEDURE — 88350 IMFLUOR EA ADDL 1ANTB STN PX: CPT | Mod: 26 | Performed by: PATHOLOGY

## 2023-07-17 RX ORDER — FENTANYL CITRATE 50 UG/ML
25 INJECTION, SOLUTION INTRAMUSCULAR; INTRAVENOUS
Status: DISCONTINUED | OUTPATIENT
Start: 2023-07-17 | End: 2023-07-17 | Stop reason: HOSPADM

## 2023-07-17 RX ORDER — LIDOCAINE 40 MG/G
CREAM TOPICAL
Status: DISCONTINUED | OUTPATIENT
Start: 2023-07-17 | End: 2023-07-17 | Stop reason: HOSPADM

## 2023-07-17 RX ORDER — OXYCODONE HYDROCHLORIDE 5 MG/1
5 TABLET ORAL EVERY 4 HOURS PRN
Status: DISCONTINUED | OUTPATIENT
Start: 2023-07-17 | End: 2023-07-17 | Stop reason: HOSPADM

## 2023-07-17 RX ORDER — NALOXONE HYDROCHLORIDE 0.4 MG/ML
0.4 INJECTION, SOLUTION INTRAMUSCULAR; INTRAVENOUS; SUBCUTANEOUS
Status: DISCONTINUED | OUTPATIENT
Start: 2023-07-17 | End: 2023-07-17 | Stop reason: HOSPADM

## 2023-07-17 RX ORDER — SIROLIMUS 0.5 MG/1
3 TABLET, FILM COATED ORAL DAILY
Qty: 180 TABLET | Refills: 11 | Status: SHIPPED | OUTPATIENT
Start: 2023-07-17 | End: 2024-07-18

## 2023-07-17 RX ORDER — POTASSIUM CHLORIDE 750 MG/1
40 TABLET, EXTENDED RELEASE ORAL
Status: DISCONTINUED | OUTPATIENT
Start: 2023-07-17 | End: 2023-07-17 | Stop reason: HOSPADM

## 2023-07-17 RX ORDER — NALOXONE HYDROCHLORIDE 0.4 MG/ML
0.2 INJECTION, SOLUTION INTRAMUSCULAR; INTRAVENOUS; SUBCUTANEOUS
Status: DISCONTINUED | OUTPATIENT
Start: 2023-07-17 | End: 2023-07-17 | Stop reason: HOSPADM

## 2023-07-17 RX ORDER — OXYCODONE HYDROCHLORIDE 10 MG/1
10 TABLET ORAL EVERY 4 HOURS PRN
Status: DISCONTINUED | OUTPATIENT
Start: 2023-07-17 | End: 2023-07-17 | Stop reason: HOSPADM

## 2023-07-17 RX ORDER — POTASSIUM CHLORIDE 750 MG/1
20 TABLET, EXTENDED RELEASE ORAL
Status: DISCONTINUED | OUTPATIENT
Start: 2023-07-17 | End: 2023-07-17 | Stop reason: HOSPADM

## 2023-07-17 RX ORDER — ATROPINE SULFATE 0.1 MG/ML
0.5 INJECTION INTRAVENOUS
Status: DISCONTINUED | OUTPATIENT
Start: 2023-07-17 | End: 2023-07-17 | Stop reason: HOSPADM

## 2023-07-17 RX ORDER — ASPIRIN 325 MG
325 TABLET ORAL ONCE
Status: COMPLETED | OUTPATIENT
Start: 2023-07-17 | End: 2023-07-17

## 2023-07-17 RX ORDER — FLUMAZENIL 0.1 MG/ML
0.2 INJECTION, SOLUTION INTRAVENOUS
Status: DISCONTINUED | OUTPATIENT
Start: 2023-07-17 | End: 2023-07-17 | Stop reason: HOSPADM

## 2023-07-17 RX ORDER — FENTANYL CITRATE 50 UG/ML
INJECTION, SOLUTION INTRAMUSCULAR; INTRAVENOUS
Status: DISCONTINUED | OUTPATIENT
Start: 2023-07-17 | End: 2023-07-17 | Stop reason: HOSPADM

## 2023-07-17 RX ORDER — IOPAMIDOL 755 MG/ML
INJECTION, SOLUTION INTRAVASCULAR
Status: DISCONTINUED | OUTPATIENT
Start: 2023-07-17 | End: 2023-07-17 | Stop reason: HOSPADM

## 2023-07-17 RX ORDER — SODIUM CHLORIDE 9 MG/ML
INJECTION, SOLUTION INTRAVENOUS CONTINUOUS
Status: DISCONTINUED | OUTPATIENT
Start: 2023-07-17 | End: 2023-07-17 | Stop reason: HOSPADM

## 2023-07-17 RX ORDER — FUROSEMIDE 20 MG
40 TABLET ORAL DAILY
Qty: 180 TABLET | Refills: 3 | Status: SHIPPED | OUTPATIENT
Start: 2023-07-17 | End: 2023-08-02

## 2023-07-17 RX ORDER — ASPIRIN 81 MG/1
243 TABLET, CHEWABLE ORAL ONCE
Status: COMPLETED | OUTPATIENT
Start: 2023-07-17 | End: 2023-07-17

## 2023-07-17 RX ADMIN — LIDOCAINE: 40 CREAM TOPICAL at 09:34

## 2023-07-17 RX ADMIN — SODIUM CHLORIDE: 9 INJECTION, SOLUTION INTRAVENOUS at 09:17

## 2023-07-17 RX ADMIN — ASPIRIN 325 MG ORAL TABLET 325 MG: 325 PILL ORAL at 09:30

## 2023-07-17 ASSESSMENT — ACTIVITIES OF DAILY LIVING (ADL)
ADLS_ACUITY_SCORE: 35

## 2023-07-17 ASSESSMENT — PAIN SCALES - GENERAL: PAINLEVEL: NO PAIN (0)

## 2023-07-17 NOTE — PROGRESS NOTES
D/I/A: Manual pressure held for 15 minutes to Right groin.  Sheath, size: 4Fr RFA, 7Fr RFV,  pulled by AURORA Corrigan.  Site CDI, no hematoma.  Stasis achieved at 1138. Off bedrest @ 1340.  A+O x4 and making needs known.  Denies pain or sob.  VSSA.  Tolerated sheath removal well.  P: Continue to monitor status.  Discharge to home once meeting criteria.

## 2023-07-17 NOTE — PATIENT INSTRUCTIONS
Please call your transplant coordinator at [phone] with any questions or concerns.  Please note: after hours, weekends and holidays, this phone number is routed to an  to page out the coordinator on call.     Coordinator will update you on remaining results.     Increase Lasix to 40 mg daily - cont to weigh yourself    Increase Sirolimus to 3 mg daily    Next lab draw: Next week recheck labs with 24-hour sirolimus level; no need to fast     Ask [place] to schedule ABIs -checks blood flow in legs     Labs in 6 months     Return to clinic in one year; sooner if needed    Dental: try to follow up with dentist    Cologuard in place of colonoscopy - talk with primary doctor     Please work on diabetes

## 2023-07-17 NOTE — PROGRESS NOTES
D/I/A: Pt roomed on 3C in bay [address].  Arrived via litter and accompanied by RN on monitor.  VSSA.  Rhythm upon arrival: NSR on monitor.  Denies pain or sob.  Reviewed activity restrictions and when to notify RN, ie-changes to breathing or increased chest pressure or chest pain.  CCL access: Right groin with sheaths in place. CMS intact  in person , spouse Shivani at bedside.   P: Continue to monitor status.  Discharge to home once meeting criteria.

## 2023-07-17 NOTE — NURSING NOTE
"Transplant Coordinator Note   Reason for visit: 3rd annual     Health concerns addressed today  Pt seen in clinic with Dr Sheridan. MD reviewed meds, available labs and testing (angio/RHC, ECHO, CXR). Biopsy pending. RA 14; Wedge 22 -> no swelling noted, pt reports weight stable. Lasix increased from 20 to 40 mg daily. Per MD no KCL supplement needed at this time. Pt enc to cont to weigh self.     Pt bought ppw from \"house call\" practitioner indicating he had mild to mod decreased circulation in legs. ABIs ordered. Pt reports some leg discomfort walking at incline     A1C 8.7. MD stress importance of getting diabetes under control. Pt verbalized understanding and is working with Endo.     Reviewed preventive cares.  Pt reports he has some chipped teeth but dental follow up cost prohibitive.      Pt declined interpretor for visit     Immunosuppressants  MMF 1500 BID   Sirolimus 2mg daily Goal 6-8 -> level 5.6 @ 21 hours. Dose increased to 3mg daily     No DSAs historically     Routine screenings  Derm: May 2023   Dental: unable due to limited resources  Colonoscopy: done 2023- poor prep- pt to ask PCP about Cologuard   Prostate: PSA 0.31   Eye Follows at [facility]   Flu Feb 2023  Pneumonia UTD  Covid LD 10/22  Shingrix 2/2-done     Meds, labs, testing reviewed with patient. Medication record reviewed and reconciled   Questions and concerns addressed. AVS reviewed and provided. Pt verbalized an understanding of plan of care.     Patient Instructions   ~Please call your transplant coordinator at [phone] with any questions or concerns. Please note: after hours, weekends and holidays, this phone number is routed to an  to page out the coordinator on call.   ~Coordinator will update you on remaining results.   ~Increase Lasix to 40 mg daily - cont to weigh yourself  ~Increase Sirolimus to 3 mg daily  ~Next lab draw: Next week recheck labs with 24-hour sirolimus level; no need to fast   ~Ask [facility] to schedule " ABIs -checks blood flow in legs   ~Labs in 6 months   ~Return to clinic in one year; sooner if needed  ~Dental: try to follow up with dentist  ~Cologuard in place of colonoscopy - talk with primary doctor   ~Please work on diabetes

## 2023-07-17 NOTE — NURSING NOTE
Chief Complaint   Patient presents with     Follow Up      64 year old male presents with systolic heart failure for follow up        Vitals were taken, medications reconciled.    Funmilayo Mae, KUSUM  4:02 PM

## 2023-07-17 NOTE — PROGRESS NOTES
Pt completed an additional 1 hour flat bedrest until 1500 without any complications. Ambulated in sanders with steady gait. Site after second ambulation c/d/i with no bleeding or hematoma. CMS intact. Site also checked at bedside by Dr Walker. Tolerating oral intake and voided. Discharge instructions reviewed with pt and spouse Shivani using in person  Lico and they verbalizes understanding and have no questions. PIV removed. Pt discharged via wheelchair to the shuttle to his next appointment accompanied by wife. Pt's daughter will pick him up after his appointment. All belongings returned to pt.

## 2023-07-17 NOTE — Clinical Note
dry, intact, no bleeding and no hematoma. 4fr RFA and 7fr RFV sheaths sutured in place, To be pulled on 3c

## 2023-07-17 NOTE — DISCHARGE INSTRUCTIONS
Going Home after a Coronary Angiogram, Right Heart Cath, Heart Biopsy  ______________________________________________      After you go home:  Have an adult stay with you for 24 hours.  Drink plenty of fluids.  You may eat your normal diet, unless your doctor tells you otherwise.  For 24 hours:  Relax and take it easy.  Do NOT smoke.  Do NOT make any important or legal decisions.  Do NOT drive or operate machines at home or at work.  Do NOT drink alcohol.  Remove the Band-Aid after 24 hours. If there is minor oozing, apply another Band-aid and remove it after 12 hours.  For 2 days, do NOT have sex or do any heavy exercise.  Do NOT take a bath, or use a hot tub or pool for at least 3 days. You may shower.    Care of groin site  It is normal to have a small bruise or lump at the site.  Do not scrub the site.  For the first 2 days: Do not stoop or squat. When you cough, sneeze or move your bowels, hold your hand over the puncture site and press gently.  Do not lift more than 10 pounds for at least 3 to 5 days.  Do not use lotion or powder near the puncture site for 3 days.    If you start bleeding from the site in your groin, lie down flat and press firmly  on the site. Call your doctor as soon as you can.    Medicines  If you have stopped any other medicines, check with your nurse or provider about when to restart them.    Call 911 right away if you have bleeding that is heavy or does not stop.    Call your doctor if:  You have a large or growing hard lump around the site.  The site is red, swollen, hot or tender.  Blood or fluid is draining from the site.  You have chills or a fever greater than 101 F (38 C).  Your leg or arm feels numb or cool.  You have hives, a rash or unusual itching.      [facility] [place]:  [phone] (7 days a week)

## 2023-07-17 NOTE — PROGRESS NOTES
Pt prepped for CORS/RHC.  VSS.  Denies pain.  Right PIV placed and currently infusing NS at 150 ml/hr.  Groins prepped.  Pulses doppler and marked.  Lido applied to R neck area.  325 mg of aspirin given.  EKG done - NSR.  Labs resulted.  Consent signed.  Wife Shivani at the bedside and will take pt home post procedure.   Lico in use for communication.    I will START or STAY ON the medications listed below when I get home from the hospital:  None

## 2023-07-17 NOTE — PROGRESS NOTES
07/17/2023    ADULT HEART TRANSPLANT CLINIC    Mr. 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  He is doing well.  He has no specific complaints.  He walks 45 minutes to an hour or 7500-10,000 steps every day.  He does have lower extremity swelling on and off.  It is more during the daytime.  He is compliant with his medications.  The main issue for him is his diabetes.  His hemoglobin A1c is not well controlled.  He has had problems with getting Trulicity insulin due to insurance.  He is being currently followed by our endocrinologist at the .    Due to his worsening renal disease, we switched him from tacrolimus and rapamycin regimen to mycophenolate and rapamycin.  He is currently on mycophenolate 1500 mg twice a day and the rapamycin with a goal of 6-8.      PAST MEDICAL HISTORY:  Past Medical History:   Diagnosis Date     CAD (coronary artery disease)      CHF (congestive heart failure) (H)      CKD (chronic kidney disease), stage III (H)      Cortical cataract of both eyes      Diabetes (H)      Hyperlipidemia      Hypertension      Infection due to Streptococcus mitis group 09/23/2020     Ischemic cardiomyopathy      Obesity      CHANTEL (obstructive sleep apnea)     occas cpap     CHANTEL (obstructive sleep apnea)- severe (AHI 30)      Osteoarthritis          CURRENT MEDICATIONS:  Current Outpatient Medications   Medication Sig     acetaminophen (TYLENOL) 325 MG tablet Take 3 tablets (975 mg) by mouth every 8 hours as needed for mild pain     Alcohol Swabs  PADS Use to swab the area of the injection or sergio as directed   Per insurance coverage     aspirin (ASA) 81 MG chewable tablet Take 1 tablet (81 mg) by mouth daily (Patient taking differently: Take 81 mg by mouth every morning)     blood glucose (NO BRAND SPECIFIED) test strip Use to test blood sugar 4 times daily or as directed.     blood glucose monitoring (ACCU-CHEK FELIPE SMARTVIEW) meter device kit Use to test blood sugar 3-4 times daily, as directed.     blood glucose monitoring (SOFTCLIX) lancets 1 each 4 times daily Use to test blood sugars 2 times daily.     Calcium Carb-Cholecalciferol (CALCIUM CARBONATE-VITAMIN D3) 600-400 MG-UNIT TABS TAKE ONE TABLET BY MOUTH TWICE A DAY WITH MEALS     Dulaglutide (TRULICITY) 3 MG/0.5ML SOPN Inject 3 mg Subcutaneous once a week Give 3 mg weekly for 1 month.  Then, If tolerated, we will place a new order to increase to 4.5 mg weekly.     Dulaglutide (TRULICITY) 4.5 MG/0.5ML SOPN Inject 4.5 mg Subcutaneous once a week     furosemide (LASIX) 20 MG tablet Take 2 tablets (40 mg) by mouth daily     HUMALOG KWIKPEN 100 UNIT/ML soln Inject 5-20 Units Subcutaneous 3 times daily (before meals) Max daily dose 60 units.     HUMALOG KWIKPEN 100 UNIT/ML soln INJECT 8 TO 14 UNITS UNDER THE SKIN THREE TIMES DAILY BEFORE A MEAL PER SLIDING SCALE AS DIRECTED     insulin glargine (LANTUS SOLOSTAR) 100 UNIT/ML pen Inject 40 Units Subcutaneous At Bedtime In PLACE OF Humulin N once we meet.     insulin NPH (HUMULIN N KWIKPEN) 100 UNIT/ML injection GIve 40 units each morning and 12 at night subcutaneous     insulin  UNIT/ML injection GIve 40 units each morning and 12-16  units each evening BEFORE meals (Patient taking differently: Inject 40 Units Subcutaneous 2 times daily GIve 40 units each morning and 12 at night)     insulin pen needle (32G X 4 MM) 32G X 4 MM miscellaneous Use 5-6 pen needles daily or as directed.     ketorolac (ACULAR) 0.5 % ophthalmic solution Place 1 drop into the  right eye 2 times daily     lisinopril (ZESTRIL) 10 MG tablet Take 1 tablet (10 mg) by mouth 2 times daily     magnesium oxide (MAG-OX) 400 (241.3 Mg) MG tablet TAKE ONE TABLET BY MOUTH TWICE A DAY     magnesium oxide 400 MG tablet Take 1 tablet (400 mg) by mouth 2 times daily     mycophenolate (GENERIC EQUIVALENT) 500 MG tablet Take 3 tablets (1,500 mg) by mouth 2 times daily     ofloxacin (OCUFLOX) 0.3 % ophthalmic solution Place 1 drop into the right eye 4 times daily     omega-3 acid ethyl esters (LOVAZA) 1 g capsule Take 1 capsule (1 g) by mouth daily     polyethylene glycol (GOLYTELY) 236 g suspension The night before the exam at 6 pm drink an 8-ounce glass every 15 minutes until the jug is half empty. If you arrive before 11 AM: Drink the other half of the Golytely jug at 11 PM night before procedure. If you arrive after 11 AM: Drink the other half of the Golytely jug at 6 AM day of procedure. For additional instructions refer to your colonoscopy prep instructions.     prednisoLONE acetate (PRED FORTE) 1 % ophthalmic suspension Place 1 drop into the right eye 2 times daily     prednisoLONE acetate (PRED FORTE) 1 % ophthalmic suspension Place 1 drop Into the left eye daily     prednisoLONE acetate (PRED FORTE) 1 % ophthalmic suspension Place 1-2 drops Into the left eye 2 times daily (Patient taking differently: Place 1 drop Into the left eye every other day)     rosuvastatin (CRESTOR) 20 MG tablet Take 1 tablet (20 mg) by mouth daily     senna-docusate (SENOKOT-S/PERICOLACE) 8.6-50 MG tablet Take 1 tablet by mouth 2 times daily as needed (hold for loose stools)     sirolimus (GENERIC EQUIVALENT) 0.5 MG tablet Take 6 tablets (3 mg) by mouth daily     tamsulosin (FLOMAX) 0.4 MG capsule TAKE ONE CAPSULE BY MOUTH ONCE DAILY     empagliflozin (JARDIANCE) 10 MG TABS tablet Take 1 tablet (10 mg) by mouth daily     ferrous sulfate (FEROSUL) 325 (65 Fe) MG tablet Take 1 tablet (325 mg) by mouth daily (with breakfast)  "    order for DME Auto CPAP 8-15 cmH20 (Patient not taking: Reported on 7/17/2023)     Current Facility-Administered Medications   Medication     bevacizumab (AVASTIN) intravitreal inj 1.25 mg     bevacizumab (AVASTIN) intravitreal inj 1.25 mg     bevacizumab (AVASTIN) intravitreal inj 1.25 mg       EXAM:  /78 (BP Location: Left arm, Patient Position: Chair, Cuff Size: Adult Regular)   Pulse 94   Ht 1.665 m (5' 5.55\")   Wt 82.5 kg (181 lb 12.8 oz)   SpO2 97%   BMI 29.75 kg/m    He was awake, alert, oriented x3.  He was comfortable.  He was in no apparent distress.  He had no pallor, cyanosis or jaundice.  His neck exam revealed no jugular venous distention.  His carotids were 2+ bilaterally.  His pulse was regular and rhythm.  Cardiac auscultation revealed normal S1 and S2 with no murmur rub or gallop.  Auscultation of the lungs revealed equal air entry on both sides.  His abdomen was soft with normal bowels with no tenderness no rigidity no guarding.  He had no focal neurological deficit.  His extremities showed no edema.    Testing:    EKG (09/2022)  Sinus tachycardia   Possible Anterior infarct , age undetermined   Abnormal ECG   When compared with ECG of 20-JUL-2021 08:57,   Nonspecific T wave abnormality now evident in Anterior leads      Echo (07/2023):  Global and regional left ventricular function is normal with an EF of 55-60%.  Right ventricular function, chamber size, wall motion, and thickness are  normal.  Pulmonary artery systolic pressure cannot be assessed.  The inferior vena cava is normal.  No pericardial effusion is present.  No significant changes noted.    RHC (07/2023)  BSA 1.89 m2  BP  120/69/91 mmHg  RA -/-/14 mmHg  RV 38/14/- mmHg  PA 39/23/30 mmHg  PCWP 22 mmHg  Jacy CO/CI 6.4/3.4  PVR 1.24 pendleton    dynes  PA sat 66%     Coronary angiogram (07/2023):  Left Main   The vessel is large. Eccentric plaque 0.3-0.5 mm.      Left Anterior Descending   The vessel is large.      First " Diagonal Branch   The vessel is small and is angiographically normal.      Second Diagonal Branch   The vessel is small and is angiographically normal.      Left Circumflex   The vessel is moderate in size.      First Obtuse Marginal Branch   The vessel is moderate in size and is angiographically normal.      Second Obtuse Marginal Branch   The vessel is moderate in size and is angiographically normal.      Right Coronary Artery   The vessel is moderate in size.      Right Posterior Descending Artery   The vessel is moderate in size and is angiographically normal.      Right Posterior Atrioventricular Artery   The vessel is moderate in size and is angiographically normal.      First Right Posterolateral Branch   The vessel is small and is angiographically normal.      Second Right Posterolateral Branch   The vessel is small and is angiographically normal.         Intervention       Biopsy (07/2023)  -Negative for acute cellular rejection    -No light microscopic features of antibody mediated rejection   -Negative immunofluorescence staining for C4d or C3d capillary deposition   -ISHLT 2005 grade 0R (previously 0); ISHLT 2013 grade pAMR0             (See Comment)      Recent Results (from the past 672 hour(s))   CBC with platelets and differential    Collection Time: 07/13/23 12:35 PM   Result Value Ref Range    WBC Count 5.9 4.0 - 11.0 10e3/uL    RBC Count 3.77 (L) 4.40 - 5.90 10e6/uL    Hemoglobin 10.6 (L) 13.3 - 17.7 g/dL    Hematocrit 33.6 (L) 40.0 - 53.0 %    MCV 89 78 - 100 fL    MCH 28.1 26.5 - 33.0 pg    MCHC 31.5 31.5 - 36.5 g/dL    RDW 14.4 10.0 - 15.0 %    Platelet Count 217 150 - 450 10e3/uL    % Neutrophils 69 %    % Lymphocytes 19 %    % Monocytes 9 %    % Eosinophils 1 %    % Basophils 0 %    % Immature Granulocytes 2 %    NRBCs per 100 WBC 0 <1 /100    Absolute Neutrophils 4.0 1.6 - 8.3 10e3/uL    Absolute Lymphocytes 1.1 0.8 - 5.3 10e3/uL    Absolute Monocytes 0.6 0.0 - 1.3 10e3/uL    Absolute  Eosinophils 0.1 0.0 - 0.7 10e3/uL    Absolute Basophils 0.0 0.0 - 0.2 10e3/uL    Absolute Immature Granulocytes 0.1 <=0.4 10e3/uL    Absolute NRBCs 0.0 10e3/uL   Echocardiogram Complete    Collection Time: 07/17/23  8:07 AM   Result Value Ref Range    LVEF  55-60%    AlloMap    Collection Time: 07/17/23  9:08 AM   Result Value Ref Range    AlloMap Score (External) 36 0 - 40    NPV % (External) 98.7 %    PPV % (External) 5.4 %   CK total    Collection Time: 07/17/23  9:08 AM   Result Value Ref Range     39 - 308 U/L   CMV Quantitative, PCR    Collection Time: 07/17/23  9:08 AM    Specimen: Arm, Right; Blood   Result Value Ref Range    CMV DNA IU/mL Not Detected Not Detected IU/mL   Comprehensive metabolic panel    Collection Time: 07/17/23  9:08 AM   Result Value Ref Range    Sodium 140 136 - 145 mmol/L    Potassium 3.9 3.4 - 5.3 mmol/L    Chloride 103 98 - 107 mmol/L    Carbon Dioxide (CO2) 24 22 - 29 mmol/L    Anion Gap 13 7 - 15 mmol/L    Urea Nitrogen 22.4 8.0 - 23.0 mg/dL    Creatinine 1.95 (H) 0.67 - 1.17 mg/dL    Calcium 9.2 8.8 - 10.2 mg/dL    Glucose 143 (H) 70 - 99 mg/dL    Alkaline Phosphatase 122 40 - 129 U/L    AST 24 0 - 45 U/L    ALT 17 0 - 70 U/L    Protein Total 6.6 6.4 - 8.3 g/dL    Albumin 4.0 3.5 - 5.2 g/dL    Bilirubin Total 0.3 <=1.2 mg/dL    GFR Estimate 37 (L) >60 mL/min/1.73m2   EBV DNA PCR Quantitative Whole Blood    Collection Time: 07/17/23  9:08 AM   Result Value Ref Range    EBV DNA Copies/mL Not Detected Not Detected copies/mL   Sirolimus by Tandem Mass Spectrometry    Collection Time: 07/17/23  9:08 AM   Result Value Ref Range    Sirolimus by Tandem Mass Spectrometry 5.6 5.0 - 15.0 ug/L    Sirolimus Last Dose Date 7/16/2023     Sirolimus Last Dose Time 12:00 PM    Phosphorus    Collection Time: 07/17/23  9:08 AM   Result Value Ref Range    Phosphorus 3.0 2.5 - 4.5 mg/dL   Magnesium    Collection Time: 07/17/23  9:08 AM   Result Value Ref Range    Magnesium 1.9 1.7 - 2.3 mg/dL    Lipid Profile    Collection Time: 07/17/23  9:08 AM   Result Value Ref Range    Cholesterol 92 <200 mg/dL    Triglycerides 178 (H) <150 mg/dL    Direct Measure HDL 30 (L) >=40 mg/dL    LDL Cholesterol Calculated 26 <=100 mg/dL    Non HDL Cholesterol 62 <130 mg/dL   [facility] Immune Cell Function    Collection Time: 07/17/23  9:08 AM   Result Value Ref Range    [facility] Immune Cell Function 276 ng/mL   Prostate Specific Antigen Screen    Collection Time: 07/17/23  9:08 AM   Result Value Ref Range    Prostate Specific Antigen Screen 0.31 0.00 - 4.50 ng/mL   Hemoglobin A1c    Collection Time: 07/17/23  9:08 AM   Result Value Ref Range    Hemoglobin A1C 8.7 (H) <5.7 %   CBC with platelets and differential    Collection Time: 07/17/23  9:08 AM   Result Value Ref Range    WBC Count 5.4 4.0 - 11.0 10e3/uL    RBC Count 3.70 (L) 4.40 - 5.90 10e6/uL    Hemoglobin 10.7 (L) 13.3 - 17.7 g/dL    Hematocrit 33.1 (L) 40.0 - 53.0 %    MCV 90 78 - 100 fL    MCH 28.9 26.5 - 33.0 pg    MCHC 32.3 31.5 - 36.5 g/dL    RDW 14.6 10.0 - 15.0 %    Platelet Count 185 150 - 450 10e3/uL    % Neutrophils 72 %    % Lymphocytes 16 %    % Monocytes 10 %    % Eosinophils 1 %    % Basophils 0 %    % Immature Granulocytes 1 %    NRBCs per 100 WBC 0 <1 /100    Absolute Neutrophils 3.8 1.6 - 8.3 10e3/uL    Absolute Lymphocytes 0.9 0.8 - 5.3 10e3/uL    Absolute Monocytes 0.6 0.0 - 1.3 10e3/uL    Absolute Eosinophils 0.1 0.0 - 0.7 10e3/uL    Absolute Basophils 0.0 0.0 - 0.2 10e3/uL    Absolute Immature Granulocytes 0.1 <=0.4 10e3/uL    Absolute NRBCs 0.0 10e3/uL   Extra Green Top (Lithium Heparin) Tube    Collection Time: 07/17/23  9:08 AM   Result Value Ref Range    Hold Specimen JIC    Ferritin    Collection Time: 07/17/23  9:08 AM   Result Value Ref Range    Ferritin 123 31 - 409 ng/mL   Iron & Iron Binding Capacity    Collection Time: 07/17/23  9:08 AM   Result Value Ref Range    Iron 56 (L) 61 - 157 ug/dL    Iron Binding Capacity 229 (L) 240 - 430  ug/dL    Iron Sat Index 24 15 - 46 %   HLA Donor Specific Antibody    Collection Time: 07/17/23  9:08 AM   Result Value Ref Range    Donor Identification 07/19/2020     Organ Heart     DSA Present NO     DSA Comments        Flow Single Antigen Beads assays are intended for detection/identification of IgG anti-HLA antibodies. Mfi values may not accurately quantify donor-specific antibody levels in all instances.    DSA Test Method SA EDTA FCS    HLA Jose D Class I, Single Antigen    Collection Time: 07/17/23  9:08 AM   Result Value Ref Range    SA 1 TEST METHOD SA EDTA FCS     SA 1 CELL Class I     SA1 HI RISK JOSE D None     SA1 MOD RISK JOSE D None     SA 1  COMMENTS        HLA PRA Test performed by modified testing procedure that may also include pretreatment of serum. Pretreatment may be the addition of fetal calf serum, EDTA, and/or adsorption.  High-risk, MFI > 3,000.  Mod-risk, -3,000.   HLA Jose D Class II, Single Antigen    Collection Time: 07/17/23  9:08 AM   Result Value Ref Range    SA 2 TEST METHOD SA EDTA FCS     SA 2 CELL Class II     SA2 HI RISK JOSE D None     SA2 MOD RISK JOSE D None     SA 2 COMMENTS        HLA PRA Test performed by modified testing procedure that may also include pretreatment of serum. Pretreatment may be the addition of fetal calf serum, EDTA, and/or adsorption.  High-risk, MFI > 3,000.  Mod-risk, -3,000.   HLA Jose D, CPRA    Collection Time: 07/17/23  9:08 AM   Result Value Ref Range    UNOS CPRA 0     UNACCEPTABLE ANTIGENS None >3000 mfi    EKG 12-lead, tracing only - Hospital locations:  [facility]    Collection Time: 07/17/23  9:09 AM   Result Value Ref Range    Systolic Blood Pressure  mmHg    Diastolic Blood Pressure  mmHg    Ventricular Rate 90 BPM    Atrial Rate 90 BPM    NJ Interval 130 ms    QRS Duration 90 ms     ms    QTc 415 ms    P Axis 3 degrees    R AXIS -17 degrees    T Axis 11 degrees    Interpretation ECG       Sinus rhythm  Low voltage QRS  Borderline  ECG  When compared with ECG of 26-SEP-2022 09:14,  QT has shortened  Confirmed by fellow Shreyas Florentino ([id number]) on 7/18/2023 1:21:08 PM  Confirmed by MD CASTILLO, JUDE [id number]) on 7/18/2023 10:14:32 PM     Surgical Pathology Exam    Collection Time: 07/17/23 10:26 AM   Result Value Ref Range    Case Report       Surgical Pathology Report                         Case: [id number]                                  Authorizing Provider:  Alex Lantigua MD  Collected:           07/17/2023 10:26 AM          Ordering Location:     [facility]          Received:            07/17/2023 11:13 AM                                 [facility]                                                    Pathologist:           Elyssa Dugan MD                                                           Specimen:    Myocardium, Endomyocardium biopsy w/IF                                                     Final Diagnosis       HEART ALLOGRAFT, ENDOMYOCARDIAL BIOPSY AT 3 YEARS:   -Negative for acute cellular rejection    -No light microscopic features of antibody mediated rejection   -Negative immunofluorescence staining for C4d or C3d capillary deposition   -ISHLT 2005 grade 0R (previously 0); ISHLT 2013 grade pAMR0             (See Comment)         Comment       Size: adequate, 3 LM + 1 IF = 4 pieces  Inflammation: absent   Phlebitis: absent   Arterial endotheliitis: absent   Myocyte damage: absent   Viral inclusions: absent   Hemorrhage: absent   Quilty lesion: absent   Other abnormalities: none     Frozen sections were stained by indirect immunofluorescence for C4d, C3d and CD31 using FITC-labeled Goat-anti-Mouse immunoglobulin as a secondary antibody. C4d/C3d stains (with appropriate negative and CD31 positive controls) show no evidence capillary or other microvascular deposition of complement.        Clinical  "Information       Procedure:  Coronary Angiogram  Right Heart Catheterization  Heart Biopsy  Pre-op Diagnosis: heart transplant on 7/19/2020 (3 years ago)  Post-op Diagnosis: Z94.1 - Heart replaced by transplant (H) [ICD-10-CM]      Gross Description       A(1). Myocardium, Endomyocardium biopsy w/IF:  The specimen is received in formalin with proper patient identification, labeled \"endomyocardium biopsy with IF\".  The specimen consists of 3 irregular tan-yellow soft tissues 0.2 to 0.3 cm in greatest dimension.  The specimen is wrapped and is entirely submitted in cassette A1.    A separate specimen is received in Jassi fixative and consists of a 0.2 cm in greatest dimension irregular tan-yellow soft tissue which is processed for immunofluorescence studies.      Microscopic Description       Microscopic examination has been performed.      I have personally reviewed all specimens and or slides, including the listed special stains, and used them with my medical judgement to determine the final diagnosis.       Performing Labs       The technical component of this testing was completed at [facility]      Case Images     Hemoglobin POCT    Collection Time: 07/17/23 10:41 AM   Result Value Ref Range    Hemoglobin POCT 9.6 (L) 13.3 - 17.7 g/dL   Oxyhemoglobin, venous POCT    Collection Time: 07/17/23 10:41 AM   Result Value Ref Range    Oxyhemoglobin Venous POCT 66 (L) 92 - 100 %   Glucose by meter    Collection Time: 07/17/23 11:17 AM   Result Value Ref Range    GLUCOSE BY METER POCT 115 (H) 70 - 99 mg/dL        Assessment/Plan:  Mr. 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    #1 status post orthoptic heart transplantation     - He is doing well  -  He has normal graft function by echocardiogram   -  His right heart catheterization revealed elevated pulm capillary wedge pressure " likely due to restrictive physiology  -  He has no evidence of allograft vasculopathy.  His IVUS shows stable intima media thickness.    -  He has no prior DSA.  -  No evidence of cellular or antibody mediated rejection on the biopsy  -  Sirolimus is subtherapeutic.  Will increase dose to get a target level of 6-8.  We will continue him on mycophenolate 1500 twice a day.  -  We will continue him on aspirin and rosuvastatin.  -  Increase his furosemide to 40 mg daily for his elevated left-sided filling pressure from his restrictive physiology.    #2 toxoplasma + serostatus - We will continue him on Bactrim 3 times a week given his low renal function.    #3 type 2 diabetes mellitus - Unfortunately his most recent hemoglobin A1c is elevated. He is on Trulicity and insulin.  He is having issues with getting Trulicity.  We appreciate endocrinology help with this.  As per discussion with endocrinology, we will start him on empagliflozin 10 mg daily.    #4 chronic kidney disease - Creatinine improved after stopping tacrolimus.  We will continue him on sirolimus and CellCept.        #5 hypertension - This is controlled on lisinopril 10 mg daily twice daily       #6 anemia -likely due to combination of chronic kidney disease and iron deficiency.  We will start him on ferrous sulfate 325 mg once a day after food.    He will return to see us in a year with echocardiogram and coronary angiogram.  He will have routine labs as per protocol in 6 months. He will call us in the interim if any further worsening symptoms.      It was a pleasure meeting Mr. Oliveira in our heart transplant clinic at [facility].  We thank you for involving us in his care.    Total time today was 48 minutes reviewing notes, imaging, labs, patient visit, orders and documentation    Sincerely,  Arvin Sheridan MD   [facility]  Heart Failure, Transplant, and Mechanical Circulatory Support Cardiology    Cardiovascular Division  [facility]   [phone]

## 2023-07-17 NOTE — LETTER
7/17/2023      RE: Lucian Oliveira  [address] [facility] [address]       Dear Colleague,    Thank you for the opportunity to participate in the care of your patient, Lucian Oliveira, at the [facility] [place] at [facility]. Please see a copy of my visit note below.      07/17/2023    ADULT HEART TRANSPLANT CLINIC    Mr. 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  He is doing well.  He has no specific complaints.  He walks 45 minutes to an hour or 7500-10,000 steps every day.  He does have lower extremity swelling on and off.  It is more during the daytime.  He is compliant with his medications.  The main issue for him is his diabetes.  His hemoglobin A1c is not well controlled.  He has had problems with getting Trulicity insulin due to insurance.  He is being currently followed by our endocrinologist at the .    Due to his worsening renal disease, we switched him from tacrolimus and rapamycin regimen to mycophenolate and rapamycin.  He is currently on mycophenolate 1500 mg twice a day and the rapamycin with a goal of 6-8.      PAST MEDICAL HISTORY:  Past Medical History:   Diagnosis Date     CAD (coronary artery disease)      CHF (congestive heart failure) (H)      CKD (chronic kidney disease), stage III (H)      Cortical cataract of both eyes      Diabetes (H)      Hyperlipidemia      Hypertension      Infection due to Streptococcus mitis group 09/23/2020     Ischemic  cardiomyopathy      Obesity      CHANTEL (obstructive sleep apnea)     occas cpap     CHANTEL (obstructive sleep apnea)- severe (AHI 30)      Osteoarthritis          CURRENT MEDICATIONS:  Current Outpatient Medications   Medication Sig     acetaminophen (TYLENOL) 325 MG tablet Take 3 tablets (975 mg) by mouth every 8 hours as needed for mild pain     Alcohol Swabs PADS Use to swab the area of the injection or sergio as directed   Per insurance coverage     aspirin (ASA) 81 MG chewable tablet Take 1 tablet (81 mg) by mouth daily (Patient taking differently: Take 81 mg by mouth every morning)     blood glucose (NO BRAND SPECIFIED) test strip Use to test blood sugar 4 times daily or as directed.     blood glucose monitoring (ACCU-CHEK FELIPE SMARTVIEW) meter device kit Use to test blood sugar 3-4 times daily, as directed.     blood glucose monitoring (SOFTCLIX) lancets 1 each 4 times daily Use to test blood sugars 2 times daily.     Calcium Carb-Cholecalciferol (CALCIUM CARBONATE-VITAMIN D3) 600-400 MG-UNIT TABS TAKE ONE TABLET BY MOUTH TWICE A DAY WITH MEALS     Dulaglutide (TRULICITY) 3 MG/0.5ML SOPN Inject 3 mg Subcutaneous once a week Give 3 mg weekly for 1 month.  Then, If tolerated, we will place a new order to increase to 4.5 mg weekly.     Dulaglutide (TRULICITY) 4.5 MG/0.5ML SOPN Inject 4.5 mg Subcutaneous once a week     furosemide (LASIX) 20 MG tablet Take 2 tablets (40 mg) by mouth daily     HUMALOG KWIKPEN 100 UNIT/ML soln Inject 5-20 Units Subcutaneous 3 times daily (before meals) Max daily dose 60 units.     HUMALOG KWIKPEN 100 UNIT/ML soln INJECT 8 TO 14 UNITS UNDER THE SKIN THREE TIMES DAILY BEFORE A MEAL PER SLIDING SCALE AS DIRECTED     insulin glargine (LANTUS SOLOSTAR) 100 UNIT/ML pen Inject 40 Units Subcutaneous At Bedtime In PLACE OF Humulin N once we meet.     insulin NPH (HUMULIN N KWIKPEN) 100 UNIT/ML injection GIve 40 units each morning and 12 at night subcutaneous     insulin  UNIT/ML injection  GIve 40 units each morning and 12-16  units each evening BEFORE meals (Patient taking differently: Inject 40 Units Subcutaneous 2 times daily GIve 40 units each morning and 12 at night)     insulin pen needle (32G X 4 MM) 32G X 4 MM miscellaneous Use 5-6 pen needles daily or as directed.     ketorolac (ACULAR) 0.5 % ophthalmic solution Place 1 drop into the right eye 2 times daily     lisinopril (ZESTRIL) 10 MG tablet Take 1 tablet (10 mg) by mouth 2 times daily     magnesium oxide (MAG-OX) 400 (241.3 Mg) MG tablet TAKE ONE TABLET BY MOUTH TWICE A DAY     magnesium oxide 400 MG tablet Take 1 tablet (400 mg) by mouth 2 times daily     mycophenolate (GENERIC EQUIVALENT) 500 MG tablet Take 3 tablets (1,500 mg) by mouth 2 times daily     ofloxacin (OCUFLOX) 0.3 % ophthalmic solution Place 1 drop into the right eye 4 times daily     omega-3 acid ethyl esters (LOVAZA) 1 g capsule Take 1 capsule (1 g) by mouth daily     polyethylene glycol (GOLYTELY) 236 g suspension The night before the exam at 6 pm drink an 8-ounce glass every 15 minutes until the jug is half empty. If you arrive before 11 AM: Drink the other half of the Golytely jug at 11 PM night before procedure. If you arrive after 11 AM: Drink the other half of the Golytely jug at 6 AM day of procedure. For additional instructions refer to your colonoscopy prep instructions.     prednisoLONE acetate (PRED FORTE) 1 % ophthalmic suspension Place 1 drop into the right eye 2 times daily     prednisoLONE acetate (PRED FORTE) 1 % ophthalmic suspension Place 1 drop Into the left eye daily     prednisoLONE acetate (PRED FORTE) 1 % ophthalmic suspension Place 1-2 drops Into the left eye 2 times daily (Patient taking differently: Place 1 drop Into the left eye every other day)     rosuvastatin (CRESTOR) 20 MG tablet Take 1 tablet (20 mg) by mouth daily     senna-docusate (SENOKOT-S/PERICOLACE) 8.6-50 MG tablet Take 1 tablet by mouth 2 times daily as needed (hold for loose  "stools)     sirolimus (GENERIC EQUIVALENT) 0.5 MG tablet Take 6 tablets (3 mg) by mouth daily     tamsulosin (FLOMAX) 0.4 MG capsule TAKE ONE CAPSULE BY MOUTH ONCE DAILY     empagliflozin (JARDIANCE) 10 MG TABS tablet Take 1 tablet (10 mg) by mouth daily     ferrous sulfate (FEROSUL) 325 (65 Fe) MG tablet Take 1 tablet (325 mg) by mouth daily (with breakfast)     order for DME Auto CPAP 8-15 cmH20 (Patient not taking: Reported on 7/17/2023)     Current Facility-Administered Medications   Medication     bevacizumab (AVASTIN) intravitreal inj 1.25 mg     bevacizumab (AVASTIN) intravitreal inj 1.25 mg     bevacizumab (AVASTIN) intravitreal inj 1.25 mg       EXAM:  /78 (BP Location: Left arm, Patient Position: Chair, Cuff Size: Adult Regular)   Pulse 94   Ht 1.665 m (5' 5.55\")   Wt 82.5 kg (181 lb 12.8 oz)   SpO2 97%   BMI 29.75 kg/m    He was awake, alert, oriented x3.  He was comfortable.  He was in no apparent distress.  He had no pallor, cyanosis or jaundice.  His neck exam revealed no jugular venous distention.  His carotids were 2+ bilaterally.  His pulse was regular and rhythm.  Cardiac auscultation revealed normal S1 and S2 with no murmur rub or gallop.  Auscultation of the lungs revealed equal air entry on both sides.  His abdomen was soft with normal bowels with no tenderness no rigidity no guarding.  He had no focal neurological deficit.  His extremities showed no edema.    Testing:    EKG (09/2022)  Sinus tachycardia   Possible Anterior infarct , age undetermined   Abnormal ECG   When compared with ECG of 20-JUL-2021 08:57,   Nonspecific T wave abnormality now evident in Anterior leads      Echo (07/2023):  Global and regional left ventricular function is normal with an EF of 55-60%.  Right ventricular function, chamber size, wall motion, and thickness are  normal.  Pulmonary artery systolic pressure cannot be assessed.  The inferior vena cava is normal.  No pericardial effusion is present.  No " significant changes noted.    RHC (07/2023)  BSA 1.89 m2  BP  120/69/91 mmHg  RA -/-/14 mmHg  RV 38/14/- mmHg  PA 39/23/30 mmHg  PCWP 22 mmHg  Jacy CO/CI 6.4/3.4  PVR 1.24 pendleton    dynes  PA sat 66%     Coronary angiogram (07/2023):  Left Main   The vessel is large. Eccentric plaque 0.3-0.5 mm.      Left Anterior Descending   The vessel is large.      First Diagonal Branch   The vessel is small and is angiographically normal.      Second Diagonal Branch   The vessel is small and is angiographically normal.      Left Circumflex   The vessel is moderate in size.      First Obtuse Marginal Branch   The vessel is moderate in size and is angiographically normal.      Second Obtuse Marginal Branch   The vessel is moderate in size and is angiographically normal.      Right Coronary Artery   The vessel is moderate in size.      Right Posterior Descending Artery   The vessel is moderate in size and is angiographically normal.      Right Posterior Atrioventricular Artery   The vessel is moderate in size and is angiographically normal.      First Right Posterolateral Branch   The vessel is small and is angiographically normal.      Second Right Posterolateral Branch   The vessel is small and is angiographically normal.         Intervention       Biopsy (07/2023)  -Negative for acute cellular rejection    -No light microscopic features of antibody mediated rejection   -Negative immunofluorescence staining for C4d or C3d capillary deposition   -ISHLT 2005 grade 0R (previously 0); ISHLT 2013 grade pAMR0             (See Comment)      Recent Results (from the past 672 hour(s))   CBC with platelets and differential    Collection Time: 07/13/23 12:35 PM   Result Value Ref Range    WBC Count 5.9 4.0 - 11.0 10e3/uL    RBC Count 3.77 (L) 4.40 - 5.90 10e6/uL    Hemoglobin 10.6 (L) 13.3 - 17.7 g/dL    Hematocrit 33.6 (L) 40.0 - 53.0 %    MCV 89 78 - 100 fL    MCH 28.1 26.5 - 33.0 pg    MCHC 31.5 31.5 - 36.5 g/dL    RDW 14.4 10.0 -  15.0 %    Platelet Count 217 150 - 450 10e3/uL    % Neutrophils 69 %    % Lymphocytes 19 %    % Monocytes 9 %    % Eosinophils 1 %    % Basophils 0 %    % Immature Granulocytes 2 %    NRBCs per 100 WBC 0 <1 /100    Absolute Neutrophils 4.0 1.6 - 8.3 10e3/uL    Absolute Lymphocytes 1.1 0.8 - 5.3 10e3/uL    Absolute Monocytes 0.6 0.0 - 1.3 10e3/uL    Absolute Eosinophils 0.1 0.0 - 0.7 10e3/uL    Absolute Basophils 0.0 0.0 - 0.2 10e3/uL    Absolute Immature Granulocytes 0.1 <=0.4 10e3/uL    Absolute NRBCs 0.0 10e3/uL   Echocardiogram Complete    Collection Time: 07/17/23  8:07 AM   Result Value Ref Range    LVEF  55-60%    AlloMap    Collection Time: 07/17/23  9:08 AM   Result Value Ref Range    AlloMap Score (External) 36 0 - 40    NPV % (External) 98.7 %    PPV % (External) 5.4 %   CK total    Collection Time: 07/17/23  9:08 AM   Result Value Ref Range     39 - 308 U/L   CMV Quantitative, PCR    Collection Time: 07/17/23  9:08 AM    Specimen: Arm, Right; Blood   Result Value Ref Range    CMV DNA IU/mL Not Detected Not Detected IU/mL   Comprehensive metabolic panel    Collection Time: 07/17/23  9:08 AM   Result Value Ref Range    Sodium 140 136 - 145 mmol/L    Potassium 3.9 3.4 - 5.3 mmol/L    Chloride 103 98 - 107 mmol/L    Carbon Dioxide (CO2) 24 22 - 29 mmol/L    Anion Gap 13 7 - 15 mmol/L    Urea Nitrogen 22.4 8.0 - 23.0 mg/dL    Creatinine 1.95 (H) 0.67 - 1.17 mg/dL    Calcium 9.2 8.8 - 10.2 mg/dL    Glucose 143 (H) 70 - 99 mg/dL    Alkaline Phosphatase 122 40 - 129 U/L    AST 24 0 - 45 U/L    ALT 17 0 - 70 U/L    Protein Total 6.6 6.4 - 8.3 g/dL    Albumin 4.0 3.5 - 5.2 g/dL    Bilirubin Total 0.3 <=1.2 mg/dL    GFR Estimate 37 (L) >60 mL/min/1.73m2   EBV DNA PCR Quantitative Whole Blood    Collection Time: 07/17/23  9:08 AM   Result Value Ref Range    EBV DNA Copies/mL Not Detected Not Detected copies/mL   Sirolimus by Tandem Mass Spectrometry    Collection Time: 07/17/23  9:08 AM   Result Value Ref Range     Sirolimus by Tandem Mass Spectrometry 5.6 5.0 - 15.0 ug/L    Sirolimus Last Dose Date 7/16/2023     Sirolimus Last Dose Time 12:00 PM    Phosphorus    Collection Time: 07/17/23  9:08 AM   Result Value Ref Range    Phosphorus 3.0 2.5 - 4.5 mg/dL   Magnesium    Collection Time: 07/17/23  9:08 AM   Result Value Ref Range    Magnesium 1.9 1.7 - 2.3 mg/dL   Lipid Profile    Collection Time: 07/17/23  9:08 AM   Result Value Ref Range    Cholesterol 92 <200 mg/dL    Triglycerides 178 (H) <150 mg/dL    Direct Measure HDL 30 (L) >=40 mg/dL    LDL Cholesterol Calculated 26 <=100 mg/dL    Non HDL Cholesterol 62 <130 mg/dL   ImmuKnow Immune Cell Function    Collection Time: 07/17/23  9:08 AM   Result Value Ref Range    ImmuKnow Immune Cell Function 276 ng/mL   Prostate Specific Antigen Screen    Collection Time: 07/17/23  9:08 AM   Result Value Ref Range    Prostate Specific Antigen Screen 0.31 0.00 - 4.50 ng/mL   Hemoglobin A1c    Collection Time: 07/17/23  9:08 AM   Result Value Ref Range    Hemoglobin A1C 8.7 (H) <5.7 %   CBC with platelets and differential    Collection Time: 07/17/23  9:08 AM   Result Value Ref Range    WBC Count 5.4 4.0 - 11.0 10e3/uL    RBC Count 3.70 (L) 4.40 - 5.90 10e6/uL    Hemoglobin 10.7 (L) 13.3 - 17.7 g/dL    Hematocrit 33.1 (L) 40.0 - 53.0 %    MCV 90 78 - 100 fL    MCH 28.9 26.5 - 33.0 pg    MCHC 32.3 31.5 - 36.5 g/dL    RDW 14.6 10.0 - 15.0 %    Platelet Count 185 150 - 450 10e3/uL    % Neutrophils 72 %    % Lymphocytes 16 %    % Monocytes 10 %    % Eosinophils 1 %    % Basophils 0 %    % Immature Granulocytes 1 %    NRBCs per 100 WBC 0 <1 /100    Absolute Neutrophils 3.8 1.6 - 8.3 10e3/uL    Absolute Lymphocytes 0.9 0.8 - 5.3 10e3/uL    Absolute Monocytes 0.6 0.0 - 1.3 10e3/uL    Absolute Eosinophils 0.1 0.0 - 0.7 10e3/uL    Absolute Basophils 0.0 0.0 - 0.2 10e3/uL    Absolute Immature Granulocytes 0.1 <=0.4 10e3/uL    Absolute NRBCs 0.0 10e3/uL   Extra Green Top (Lithium Heparin) Tube     Collection Time: 07/17/23  9:08 AM   Result Value Ref Range    Hold Specimen JIC    Ferritin    Collection Time: 07/17/23  9:08 AM   Result Value Ref Range    Ferritin 123 31 - 409 ng/mL   Iron & Iron Binding Capacity    Collection Time: 07/17/23  9:08 AM   Result Value Ref Range    Iron 56 (L) 61 - 157 ug/dL    Iron Binding Capacity 229 (L) 240 - 430 ug/dL    Iron Sat Index 24 15 - 46 %   HLA Donor Specific Antibody    Collection Time: 07/17/23  9:08 AM   Result Value Ref Range    Donor Identification 07/19/2020     Organ Heart     DSA Present NO     DSA Comments        Flow Single Antigen Beads assays are intended for detection/identification of IgG anti-HLA antibodies. Mfi values may not accurately quantify donor-specific antibody levels in all instances.    DSA Test Method SA EDTA FCS    HLA Jose D Class I, Single Antigen    Collection Time: 07/17/23  9:08 AM   Result Value Ref Range    SA 1 TEST METHOD SA EDTA FCS     SA 1 CELL Class I     SA1 HI RISK JOSE D None     SA1 MOD RISK JOSE D None     SA 1  COMMENTS        HLA PRA Test performed by modified testing procedure that may also include pretreatment of serum. Pretreatment may be the addition of fetal calf serum, EDTA, and/or adsorption.  High-risk, MFI > 3,000.  Mod-risk, -3,000.   HLA Jose D Class II, Single Antigen    Collection Time: 07/17/23  9:08 AM   Result Value Ref Range    SA 2 TEST METHOD SA EDTA FCS     SA 2 CELL Class II     SA2 HI RISK JOSE D None     SA2 MOD RISK JOSE D None     SA 2 COMMENTS        HLA PRA Test performed by modified testing procedure that may also include pretreatment of serum. Pretreatment may be the addition of fetal calf serum, EDTA, and/or adsorption.  High-risk, MFI > 3,000.  Mod-risk, -3,000.   HLA Jose D, CPRA    Collection Time: 07/17/23  9:08 AM   Result Value Ref Range    UNOS CPRA 0     UNACCEPTABLE ANTIGENS None >3000 mfi    EKG 12-lead, tracing only - Hospital locations:  [facility]    Collection Time: 07/17/23   9:09 AM   Result Value Ref Range    Systolic Blood Pressure  mmHg    Diastolic Blood Pressure  mmHg    Ventricular Rate 90 BPM    Atrial Rate 90 BPM    DC Interval 130 ms    QRS Duration 90 ms     ms    QTc 415 ms    P Axis 3 degrees    R AXIS -17 degrees    T Axis 11 degrees    Interpretation ECG       Sinus rhythm  Low voltage QRS  Borderline ECG  When compared with ECG of 26-SEP-2022 09:14,  QT has shortened  Confirmed by fellow Shreyas Florentino ([id number]) on 7/18/2023 1:21:08 PM  Confirmed by MD CASTILLO, JUDE [id number]) on 7/18/2023 10:14:32 PM     Surgical Pathology Exam    Collection Time: 07/17/23 10:26 AM   Result Value Ref Range    Case Report       Surgical Pathology Report                         Case: [id number]                                  Authorizing Provider:  Alex Lantigua MD  Collected:           07/17/2023 10:26 AM          Ordering Location:     [facility]          Received:            07/17/2023 11:13 AM                                 [facility]                                                    Pathologist:           Elyssa Dugan MD                                                           Specimen:    Myocardium, Endomyocardium biopsy w/IF                                                     Final Diagnosis       HEART ALLOGRAFT, ENDOMYOCARDIAL BIOPSY AT 3 YEARS:   -Negative for acute cellular rejection    -No light microscopic features of antibody mediated rejection   -Negative immunofluorescence staining for C4d or C3d capillary deposition   -ISHLT 2005 grade 0R (previously 0); ISHLT 2013 grade pAMR0             (See Comment)         Comment       Size: adequate, 3 LM + 1 IF = 4 pieces  Inflammation: absent   Phlebitis: absent   Arterial endotheliitis: absent   Myocyte damage: absent   Viral inclusions: absent   Hemorrhage: absent   Quilty lesion: absent  "  Other abnormalities: none     Frozen sections were stained by indirect immunofluorescence for C4d, C3d and CD31 using FITC-labeled Goat-anti-Mouse immunoglobulin as a secondary antibody. C4d/C3d stains (with appropriate negative and CD31 positive controls) show no evidence capillary or other microvascular deposition of complement.        Clinical Information       Procedure:  Coronary Angiogram  Right Heart Catheterization  Heart Biopsy  Pre-op Diagnosis: heart transplant on 7/19/2020 (3 years ago)  Post-op Diagnosis: Z94.1 - Heart replaced by transplant (H) [ICD-10-CM]      Gross Description       A(1). Myocardium, Endomyocardium biopsy w/IF:  The specimen is received in formalin with proper patient identification, labeled \"endomyocardium biopsy with IF\".  The specimen consists of 3 irregular tan-yellow soft tissues 0.2 to 0.3 cm in greatest dimension.  The specimen is wrapped and is entirely submitted in cassette A1.    A separate specimen is received in Jassi fixative and consists of a 0.2 cm in greatest dimension irregular tan-yellow soft tissue which is processed for immunofluorescence studies.      Microscopic Description       Microscopic examination has been performed.      I have personally reviewed all specimens and or slides, including the listed special stains, and used them with my medical judgement to determine the final diagnosis.       Performing Labs       The technical component of this testing was completed at [facility]      Case Images     Hemoglobin POCT    Collection Time: 07/17/23 10:41 AM   Result Value Ref Range    Hemoglobin POCT 9.6 (L) 13.3 - 17.7 g/dL   Oxyhemoglobin, venous POCT    Collection Time: 07/17/23 10:41 AM   Result Value Ref Range    Oxyhemoglobin Venous POCT 66 (L) 92 - 100 %   Glucose by meter    Collection Time: 07/17/23 11:17 AM   Result Value Ref Range    GLUCOSE BY METER POCT 115 (H) 70 - 99 mg/dL      "   Assessment/Plan:  Mr. 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    #1 status post orthoptic heart transplantation     - He is doing well  -  He has normal graft function by echocardiogram   -  His right heart catheterization revealed elevated pulm capillary wedge pressure likely due to restrictive physiology  -  He has no evidence of allograft vasculopathy.  His IVUS shows stable intima media thickness.    -  He has no prior DSA.  -  No evidence of cellular or antibody mediated rejection on the biopsy  -  Sirolimus is subtherapeutic.  Will increase dose to get a target level of 6-8.  We will continue him on mycophenolate 1500 twice a day.  -  We will continue him on aspirin and rosuvastatin.  -  Increase his furosemide to 40 mg daily for his elevated left-sided filling pressure from his restrictive physiology.    #2 toxoplasma + serostatus - We will continue him on Bactrim 3 times a week given his low renal function.    #3 type 2 diabetes mellitus - Unfortunately his most recent hemoglobin A1c is elevated. He is on Trulicity and insulin.  He is having issues with getting Trulicity.  We appreciate endocrinology help with this.  As per discussion with endocrinology, we will start him on empagliflozin 10 mg daily.    #4 chronic kidney disease - Creatinine improved after stopping tacrolimus.  We will continue him on sirolimus and CellCept.        #5 hypertension - This is controlled on lisinopril 10 mg daily twice daily       #6 anemia -likely due to combination of chronic kidney disease and iron deficiency.  We will start him on ferrous sulfate 325 mg once a day after food.    He will return to see us in a year with echocardiogram and coronary angiogram.  He will have routine labs as per protocol in 6 months. He will call us in the interim if any further worsening symptoms.      It was a pleasure meeting Mr. Oliveira in our heart transplant  clinic at [facility].  We thank you for involving us in his care.    Total time today was 48 minutes reviewing notes, imaging, labs, patient visit, orders and documentation    Sincerely,  Arvin Sheridan MD   [facility]  Heart Failure, Transplant, and Mechanical Circulatory Support Cardiology   Cardiovascular Division  [facility]   [phone]          Please do not hesitate to contact me if you have any questions/concerns.     Sincerely,     Arvin Sheridan MD

## 2023-07-17 NOTE — PROGRESS NOTES
Pt developed hematoma after getting up to walk. Manual pressure held for 10 minutes and hemostasis achieved at 1400. Vitals stable. On flat bedrest, fellow paged to clarify new bedrest order. Wife at bedside.

## 2023-07-18 ENCOUNTER — TELEPHONE (OUTPATIENT)
Dept: ONCOLOGY | Facility: CLINIC | Age: 68
End: 2023-07-18
Payer: COMMERCIAL

## 2023-07-18 DIAGNOSIS — Z94.1 HEART REPLACED BY TRANSPLANT (H): Primary | ICD-10-CM

## 2023-07-18 DIAGNOSIS — E11.3513 TYPE 2 DIABETES MELLITUS WITH PROLIFERATIVE RETINOPATHY OF BOTH EYES AND MACULAR EDEMA, UNSPECIFIED WHETHER LONG TERM INSULIN USE (H): Primary | ICD-10-CM

## 2023-07-18 DIAGNOSIS — Z79.899 ENCOUNTER FOR LONG-TERM (CURRENT) USE OF HIGH-RISK MEDICATION: ICD-10-CM

## 2023-07-18 LAB
ATRIAL RATE - MUSE: 90 BPM
CMV DNA SPEC NAA+PROBE-ACNC: NOT DETECTED IU/ML
DIASTOLIC BLOOD PRESSURE - MUSE: NORMAL MMHG
EBV DNA # SPEC NAA+PROBE: NOT DETECTED COPIES/ML
INTERPRETATION ECG - MUSE: NORMAL
P AXIS - MUSE: 3 DEGREES
PATH REPORT.COMMENTS IMP SPEC: NORMAL
PATH REPORT.FINAL DX SPEC: NORMAL
PATH REPORT.GROSS SPEC: NORMAL
PATH REPORT.MICROSCOPIC SPEC OTHER STN: NORMAL
PATH REPORT.RELEVANT HX SPEC: NORMAL
PHOTO IMAGE: NORMAL
PR INTERVAL - MUSE: 130 MS
QRS DURATION - MUSE: 90 MS
QT - MUSE: 340 MS
QTC - MUSE: 415 MS
R AXIS - MUSE: -17 DEGREES
SYSTOLIC BLOOD PRESSURE - MUSE: NORMAL MMHG
T AXIS - MUSE: 11 DEGREES
VENTRICULAR RATE- MUSE: 90 BPM

## 2023-07-19 LAB
DONOR IDENTIFICATION: NORMAL
DSA COMMENTS: NORMAL
DSA PRESENT: NO
DSA TEST METHOD: NORMAL
IMMUKNOW IMMUNE CELL FUNCTION: 276 NG/ML
ORGAN: NORMAL
SA 1 CELL: NORMAL
SA 1 TEST METHOD: NORMAL
SA 2 CELL: NORMAL
SA 2 TEST METHOD: NORMAL
SA1 HI RISK ABY: NORMAL
SA1 MOD RISK ABY: NORMAL
SA2 HI RISK ABY: NORMAL
SA2 MOD RISK ABY: NORMAL
UNACCEPTABLE ANTIGENS: NORMAL
UNOS CPRA: 0
ZZZSA 1  COMMENTS: NORMAL
ZZZSA 2 COMMENTS: NORMAL

## 2023-07-20 ENCOUNTER — TELEPHONE (OUTPATIENT)
Dept: TRANSPLANT | Facility: CLINIC | Age: 68
End: 2023-07-20
Payer: COMMERCIAL

## 2023-07-20 ENCOUNTER — HOSPITAL ENCOUNTER (OUTPATIENT)
Facility: CLINIC | Age: 68
End: 2023-07-20
Attending: INTERNAL MEDICINE | Admitting: INTERNAL MEDICINE
Payer: COMMERCIAL

## 2023-07-20 ENCOUNTER — TELEPHONE (OUTPATIENT)
Dept: GASTROENTEROLOGY | Facility: CLINIC | Age: 68
End: 2023-07-20
Payer: COMMERCIAL

## 2023-07-20 ENCOUNTER — TELEPHONE (OUTPATIENT)
Dept: ENDOCRINOLOGY | Facility: CLINIC | Age: 68
End: 2023-07-20
Payer: COMMERCIAL

## 2023-07-20 DIAGNOSIS — Z94.1 HEART REPLACED BY TRANSPLANT (H): Primary | ICD-10-CM

## 2023-07-20 RX ORDER — FERROUS SULFATE 325(65) MG
325 TABLET ORAL
Qty: 90 TABLET | Refills: 3 | Status: SHIPPED | OUTPATIENT
Start: 2023-07-20 | End: 2024-05-02 | Stop reason: DRUGHIGH

## 2023-07-20 NOTE — TELEPHONE ENCOUNTER
Please contact patieint and schedule with Marisabel Prieto  RTN Inperson  please Jeimy Casas RN on 7/20/2023 at 10:03 AM

## 2023-07-20 NOTE — TELEPHONE ENCOUNTER
----- Message from Marisabel Prieto PA-C sent at 7/19/2023  4:30 PM CDT -----  Regarding: Did i ask for them to be scheduled for an in person appt please?  Did i ask for them to be scheduled for an in person appt please? Please.  Thank you!  ----- Message -----  From: Arvin Sheridan MD  Sent: 7/19/2023   3:31 PM CDT  To: Marisabel Prieto PA-C; #    Hi Marisabel,    We saw Mr. Oliveira a number heart transplant clinic.  He is doing well from a heart transplant perspective.  His hemoglobin A1c continues remain elevated.  We really appreciate your help.    1.  Somehow we missed your previous message.  We are totally okay with starting Jardiance from a cardiac perspective.  Do you want to start 10 mg or 25 mg daily?    2.  Is there anything else we can do to help us blood sugar levels controlled?    Thank you so much again for your help with this care    Sincerely,  Arvin

## 2023-07-20 NOTE — TELEPHONE ENCOUNTER
Communication Summary: LVM with  assistance and sent mychart    Appointment type: return diabetes  Provider: Marisabel Prieto  Return date: See below  Speciality phone number: [phone]  Additional appointment(s) needed: N/A  Additional notes: Called patient to schedule in person visit; unable to reach. Left voicemail and sent RisparmioSuperhart with info on how to schedule    Arfii A Jr on 7/20/2023 at 10:26 AM

## 2023-07-20 NOTE — TELEPHONE ENCOUNTER
Pt called using  with remaining lab/testing results from this week's visit.  Heart biopsy was negative.  No DSAs.  Immuknow with mod immune cell response.  Iron and BG levels reviewed with Dr. Sheridan.  Pt to start 325mg of Iron PO daily as well as Jardiance 10mg daily.  Sirolimus dose increased to 3mg daily at visit- pt to have lab rechecked in one week.  Pt and spouse state understanding plan and instructions.

## 2023-07-20 NOTE — TELEPHONE ENCOUNTER
"Endoscopy Scheduling Screen    Have you had a positive Covid test in the last 14 days?  No    Are you active on MyChart?   No    What insurance is in the chart?  Other:  [facility]    Ordering/Referring Provider: KAIA WEINER   (If ordering provider performs procedure, schedule with ordering provider unless otherwise instructed. )    BMI: Estimated body mass index is 29.75 kg/m  as calculated from the following:    Height as of 7/17/23: 1.665 m (5' 5.55\").    Weight as of 7/17/23: 82.5 kg (181 lb 12.8 oz).     Sedation Ordered  moderate sedation.   If patient BMI > 50 do not schedule in [facility].    Are you taking any prescription medications for pain?   No    Are you taking methadone or Suboxone?  No    Do you have a history of malignant hyperthermia or adverse reaction to anesthesia?  No    (Females) Are you currently pregnant?        Have you been diagnosed or told you have pulmonary hypertension?   No    Do you have an LVAD?  No    Have you been told you have moderate to severe sleep apnea?  No    Have you been told you have COPD, asthma, or any other lung disease?  No    Do you have any heart conditions?  No     Have you ever had or are you awaiting a heart or lung transplant?   Yes (Hospital Only) HEART    Have you had a stroke or transient ischemic attack (TIA aka \"mini stroke\" in the last 6 months?   No    Have you been diagnosed with or been told you have cirrhosis of the liver?   No    Are you currently on dialysis?   No    Do you need assistance transferring?   No    BMI: Estimated body mass index is 29.75 kg/m  as calculated from the following:    Height as of 7/17/23: 1.665 m (5' 5.55\").    Weight as of 7/17/23: 82.5 kg (181 lb 12.8 oz).     Is patients BMI > 40 and scheduling location [facility]?  No    Do you take the medication Phentermine, Ozempic or Wegovy?  No    Do you take the medication Naltrexone?  No    Do you take blood thinners?  No      Prep   Are you currently on dialysis or do you have " chronic kidney disease?  Yes ([facility] Prep)    Do you have a diagnosis of diabetes?  Yes (Golytely Prep)    Do you have a diagnosis of cystic fibrosis (CF)?  No    On a regular basis do you go 3 -5 days between bowel movements?  No    BMI > 40?  No    Preferred Pharmacy:      [id number] - [place], [place] - [facility] [facility] [address]  [facility] [address]  Phone: [phone] Fax: [phone]        Final Scheduling Details   Colonoscopy prep sent?  EXTENDED    Procedure scheduled  Colonoscopy    Surgeon:  HUSAM     Date of procedure:  10/13     Schedule PAC:   No    Location  [facility]    Sedation   Moderate Sedation    Patient Reminders:    You will receive a call from a Nurse to review instructions and health history.  This assessment must be completed prior to your procedure.  Failure to complete the Nurse assessment may result in the procedure being cancelled.       On the day of your procedure, please designate an adult(s) who can drive you home stay with you for the next 24 hours. The medicines used in the exam will make you sleepy. You will not be able to drive.       You cannot take public transportation, ride share services, or non-medical taxi service without a responsible caregiver.  Medical transport services are allowed with the requirement that a responsible caregiver will receive you at your destination.  We require that drivers and caregivers are confirmed prior to your procedure.

## 2023-07-20 NOTE — TELEPHONE ENCOUNTER
I spoke with Patient and spouse with . He is  injecting Trulcity 4.5 mg weekly. They will expect the Jardiance 10 mg  a little white pill through [facility] and I don't see orders placed yet. I see Trulicity 3 mg  on chart as well  should that be discontinued? Someone to call him to schedule in person . Jeimy Casas RN on 7/20/2023 at 10:25 AM

## 2023-07-24 ENCOUNTER — TELEPHONE (OUTPATIENT)
Dept: ENDOCRINOLOGY | Facility: CLINIC | Age: 68
End: 2023-07-24
Payer: COMMERCIAL

## 2023-07-24 NOTE — TELEPHONE ENCOUNTER
Communication Summary: Spoke to patient with  assistance and scheduled follow up     Appointment type: Return Diabetes  Provider: Jacque Smith  Return date: 07/25/2023  Speciality phone number: [phone]  Additional appointment(s) needed: N/A  Additional notes: N/A    Sergey Norris on 7/24/2023 at 11:19 AM

## 2023-07-25 ENCOUNTER — OFFICE VISIT (OUTPATIENT)
Dept: ENDOCRINOLOGY | Facility: CLINIC | Age: 68
End: 2023-07-25
Payer: COMMERCIAL

## 2023-07-25 VITALS
HEIGHT: 65 IN | SYSTOLIC BLOOD PRESSURE: 108 MMHG | DIASTOLIC BLOOD PRESSURE: 70 MMHG | BODY MASS INDEX: 29.49 KG/M2 | OXYGEN SATURATION: 98 % | WEIGHT: 177 LBS | HEART RATE: 97 BPM

## 2023-07-25 DIAGNOSIS — Z94.1 HEART TRANSPLANT, ORTHOTOPIC, STATUS (H): ICD-10-CM

## 2023-07-25 PROCEDURE — 99215 OFFICE O/P EST HI 40 MIN: CPT | Performed by: PHYSICIAN ASSISTANT

## 2023-07-25 ASSESSMENT — PAIN SCALES - GENERAL: PAINLEVEL: NO PAIN (0)

## 2023-07-25 NOTE — NURSING NOTE
"Chief Complaint   Patient presents with    Diabetes     Vital signs:      BP: 108/70 Pulse: 97     SpO2: 98 %     Height: 165.1 cm (5' 5\") Weight: 80.3 kg (177 lb)  Estimated body mass index is 29.45 kg/m  as calculated from the following:    Height as of this encounter: 1.651 m (5' 5\").    Weight as of this encounter: 80.3 kg (177 lb).        "

## 2023-07-25 NOTE — LETTER
7/25/2023       RE: Lucian Oliveira  [address] [facility] [id number]     Dear Colleague,    Thank you for referring your patient, Lucian Oliveira, to the [facility] ENDOCRINOLOGY [facility] [place] at [facility]. Please see a copy of my visit note below.    HPI  Lucian Oliveira is a 67 year old male with type 2 diabetes mellitus here today in clinic for a diabetes follow up visit.  His wife is present and a Tuvaluan speaking  was on the phone during our visit today.  Pt last seen by Marisabel Prieto PA-C in May 2023.  Type 2 DM > 20 years complicated by proliferative diabetic retinopathy with several eye procedures, nephropathy, neuropathy, hx of CAD- ischemic cardiomyopathy and  s/p CABG.  S/P heart transplant on 7/19/2020.   Pt also has hx of CHF, HTN, hyperlipidemia, obesity and CHANTEL.  For his diabetes, he tells me he is taking Trulicity 4.5 mg subcutaneous once a week, Humulin NPH 40 units in am and 12 units in pm, Humalog 12-14 with meals and he was given a RX for Jardiance 10 mg daily by Cardiologist which he plans to get from the pharmacy in a few days.  Pt's A1C was 8.7 % on 7/17/2023.  He provided me a few blood sugar readings from his glucose meter and his postprandial blood sugars are the highest. No hypoglycemia.  On ROS today, poor vision.   He walks daily.  Some LE swelling.   Denies n/v, SOB at rest, cough or chest pain.  No abd pain or diarrhea.  Denies dysuria.   No foot ulcers.    Diabetes Care  Retinopathy: yes; seen by Oph on 7/18/2023.  Nephropathy: yes. Pt taking Lisinopril.  Neuropathy: yes.  Foot Exam: no ulcers.  Taking aspirin: yes.  Lipids: LDL 26 in July 2023. Pt taking Crestor.  Insulin: Humulin NPH BID and Humalog meal time insulin.  DM meds: Trulicity and Jardiance.   Testing : glucose meter.         ROS  See under HPI.    Allergies  Allergies   Allergen Reactions    Heparin Heparin Induced  Thrombocytopenia     HIT screen sent 7/18/2020. CORRINE confirmed positive       Medications  Current Outpatient Medications   Medication Sig Dispense Refill    acetaminophen (TYLENOL) 325 MG tablet Take 3 tablets (975 mg) by mouth every 8 hours as needed for mild pain 60 tablet 3    Alcohol Swabs PADS Use to swab the area of the injection or sergio as directed   Per insurance coverage 100 each 0    aspirin (ASA) 81 MG chewable tablet Take 1 tablet (81 mg) by mouth daily (Patient taking differently: Take 81 mg by mouth every morning) 120 tablet 0    blood glucose (NO BRAND SPECIFIED) test strip Use to test blood sugar 4 times daily or as directed. 400 strip 3    blood glucose monitoring (ACCU-CHEK FELIPE SMARTVIEW) meter device kit Use to test blood sugar 3-4 times daily, as directed. 1 kit 0    blood glucose monitoring (SOFTCLIX) lancets 1 each 4 times daily Use to test blood sugars 2 times daily. 400 each 8    Calcium Carb-Cholecalciferol (CALCIUM CARBONATE-VITAMIN D3) 600-400 MG-UNIT TABS TAKE ONE TABLET BY MOUTH TWICE A DAY WITH MEALS 180 tablet 3    Dulaglutide (TRULICITY) 4.5 MG/0.5ML SOPN Inject 4.5 mg Subcutaneous once a week 6 mL 1    empagliflozin (JARDIANCE) 10 MG TABS tablet Take 1 tablet (10 mg) by mouth daily 90 tablet 3    ferrous sulfate (FEROSUL) 325 (65 Fe) MG tablet Take 1 tablet (325 mg) by mouth daily (with breakfast) 90 tablet 3    furosemide (LASIX) 20 MG tablet Take 2 tablets (40 mg) by mouth daily 180 tablet 3    HUMALOG KWIKPEN 100 UNIT/ML soln Inject 5-20 Units Subcutaneous 3 times daily (before meals) Max daily dose 60 units. 60 mL 1    HUMALOG KWIKPEN 100 UNIT/ML soln INJECT 8 TO 14 UNITS UNDER THE SKIN THREE TIMES DAILY BEFORE A MEAL PER SLIDING SCALE AS DIRECTED 15 mL 1    insulin NPH (HUMULIN N KWIKPEN) 100 UNIT/ML injection GIve 40 units each morning and 12 at night subcutaneous 45 mL 1    insulin  UNIT/ML injection GIve 40 units each morning and 12-16  units each evening BEFORE  meals (Patient taking differently: Inject 40 Units Subcutaneous 2 times daily GIve 40 units each morning and 12 at night) 60 mL 3    insulin pen needle (32G X 4 MM) 32G X 4 MM miscellaneous Use 5-6 pen needles daily or as directed. 450 each 3    ketorolac (ACULAR) 0.5 % ophthalmic solution Place 1 drop into the right eye 2 times daily 10 mL 3    lisinopril (ZESTRIL) 10 MG tablet Take 1 tablet (10 mg) by mouth 2 times daily 60 tablet 11    magnesium oxide (MAG-OX) 400 (241.3 Mg) MG tablet TAKE ONE TABLET BY MOUTH TWICE A  tablet 3    magnesium oxide 400 MG tablet Take 1 tablet (400 mg) by mouth 2 times daily 180 tablet 1    mycophenolate (GENERIC EQUIVALENT) 500 MG tablet Take 3 tablets (1,500 mg) by mouth 2 times daily 180 tablet 11    ofloxacin (OCUFLOX) 0.3 % ophthalmic solution Place 1 drop into the right eye 4 times daily 5 mL 0    omega-3 acid ethyl esters (LOVAZA) 1 g capsule Take 1 capsule (1 g) by mouth daily 90 capsule 3    polyethylene glycol (GOLYTELY) 236 g suspension The night before the exam at 6 pm drink an 8-ounce glass every 15 minutes until the jug is half empty. If you arrive before 11 AM: Drink the other half of the Golytely jug at 11 PM night before procedure. If you arrive after 11 AM: Drink the other half of the Golytely jug at 6 AM day of procedure. For additional instructions refer to your colonoscopy prep instructions. 4000 mL 0    prednisoLONE acetate (PRED FORTE) 1 % ophthalmic suspension Place 1 drop into the right eye 2 times daily 10 mL 3    prednisoLONE acetate (PRED FORTE) 1 % ophthalmic suspension Place 1 drop Into the left eye daily 10 mL 2    prednisoLONE acetate (PRED FORTE) 1 % ophthalmic suspension Place 1-2 drops Into the left eye 2 times daily (Patient taking differently: Place 1 drop Into the left eye every other day) 1 Bottle 1    rosuvastatin (CRESTOR) 20 MG tablet Take 1 tablet (20 mg) by mouth daily 90 tablet 3    senna-docusate (SENOKOT-S/PERICOLACE) 8.6-50 MG  tablet Take 1 tablet by mouth 2 times daily as needed (hold for loose stools) 60 tablet 3    sirolimus (GENERIC EQUIVALENT) 0.5 MG tablet Take 6 tablets (3 mg) by mouth daily 180 tablet 11    tamsulosin (FLOMAX) 0.4 MG capsule TAKE ONE CAPSULE BY MOUTH ONCE DAILY 90 capsule 3    order for DME Auto CPAP 8-15 cmH20 1 Units 0       Family History  family history includes Diabetes in his brother, sister, and sister.    Social History   reports that he has never smoked. He has never used smokeless tobacco. He reports that he does not drink alcohol and does not use drugs.     Past Medical History  Past Medical History:   Diagnosis Date    CAD (coronary artery disease)     CHF (congestive heart failure) (H)     CKD (chronic kidney disease), stage III (H)     Cortical cataract of both eyes     Diabetes (H)     Hyperlipidemia     Hypertension     Infection due to Streptococcus mitis group 09/23/2020    Ischemic cardiomyopathy     Obesity     CHANTEL (obstructive sleep apnea)     occas cpap    CHANTEL (obstructive sleep apnea)- severe (AHI 30)     Osteoarthritis        Past Surgical History:   Procedure Laterality Date    CATARACT IOL, RT/LT      COLONOSCOPY N/A 8/7/2019    Procedure: COLONOSCOPY, WITH POLYPECTOMY AND BIOPSY;  Surgeon: Chauncey Morataya MD;  Location:  GI    COLONOSCOPY N/A 5/12/2023    Procedure: Colonoscopy;  Surgeon: Mariano Velasquez MD;  Location:  GI    CV ANGIOGRAM CORONARY GRAFT N/A 7/2/2020    Procedure: Angiogram Coronary Graft;  Surgeon: Alex Lantigua MD;  Location: [facility] CARDIAC CATH LAB    CV CORONARY ANGIOGRAM N/A 7/2/2020    Procedure: CV CORONARY ANGIOGRAM;  Surgeon: Alex Lantigua MD;  Location: [facility] CARDIAC CATH LAB    CV CORONARY ANGIOGRAM N/A 7/20/2021    Procedure: CV CORONARY ANGIOGRAM;  Surgeon: Raimundo Hudson MD;  Location: [facility] CARDIAC CATH LAB    CV CORONARY ANGIOGRAM N/A 9/12/2022    Procedure: Coronary Angiogram- BIPLANE;   Surgeon: Raimundo Hudson MD;  [facility]    CV CORONARY ANGIOGRAM N/A 7/17/2023    Procedure: Coronary Angiogram;  Surgeon: Alex Lantigua MD;  [facility]    CV HEART BIOPSY N/A 7/27/2020    Procedure: Heart Biopsy;  Surgeon: Mario Burr MD;  [facility]    CV HEART BIOPSY N/A 8/3/2020    Procedure: CV HEART BIOPSY;  Surgeon: Alex Lantigua MD;  [facility]    CV HEART BIOPSY N/A 8/10/2020    Procedure: CV HEART BIOPSY;  Surgeon: Mario Burr MD;  [facility]    CV HEART BIOPSY N/A 8/17/2020    Procedure: CV HEART BIOPSY;  Surgeon: Raimundo Hudson MD;  [facility]    CV HEART BIOPSY N/A 8/31/2020    Procedure: CV HEART BIOPSY;  Surgeon: Moises Santos MD;  [facility]    CV HEART BIOPSY N/A 9/14/2020    Procedure: CV HEART BIOPSY;  Surgeon: Raimundo Hudson MD;  [facility]    CV HEART BIOPSY N/A 9/28/2020    Procedure: CV HEART BIOPSY;  Surgeon: Raimundo Hudson MD;  [facility]    CV HEART BIOPSY N/A 10/12/2020    Procedure: CV HEART BIOPSY;  Surgeon: John Stuart MD;  [facility]    CV HEART BIOPSY N/A 11/10/2020    Procedure: CV HEART BIOPSY;  Surgeon: John Stuart MD;  [facility]    CV HEART BIOPSY N/A 12/7/2020    Procedure: CV HEART BIOPSY;  Surgeon: Raimundo Hudson MD;  [facility]    CV HEART BIOPSY N/A 1/5/2021    Procedure: CV HEART BIOPSY;  Surgeon: Raimundo Hudson MD;  [facility]    CV HEART BIOPSY N/A 7/20/2021    Procedure: CV HEART BIOPSY;  Surgeon: Raimundo Hudson MD;  Location:  HEART CARDIAC CATH LAB    CV HEART BIOPSY N/A 9/28/2021    Procedure:  CV HEART BIOPSY;  Surgeon: Raimundo Hudson MD;  [facility]    CV HEART BIOPSY N/A 9/12/2022    Procedure: Heart Biopsy;  Surgeon: Raimundo Hudson MD;  [facility]    CV HEART BIOPSY N/A 7/17/2023    Procedure: Heart Biopsy;  Surgeon: Alex Lantigua MD;  [facility]    CV INTRA AORTIC BALLOON N/A 7/14/2020    Procedure: RHC WITH LEAVE IN AN/IABP;  Surgeon: Sonny Saleh MD;  [facility]    CV INTRAVASULAR ULTRASOUND N/A 9/12/2022    Procedure: Intravascular Ultrasound;  Surgeon: Raimundo Hudson MD;  Location:  HEART CARDIAC CATH LAB    CV RIGHT HEART CATH MEASUREMENTS RECORDED N/A 3/25/2019    Procedure: CV RIGHT HEART CATH;  Surgeon: Moises Santos MD;  [facility]    CV RIGHT HEART CATH MEASUREMENTS RECORDED N/A 7/10/2019    Procedure: CV RIGHT HEART CATH;  Surgeon: Jak Mccabe MD;  [facility]    CV RIGHT HEART CATH MEASUREMENTS RECORDED N/A 7/8/2020    Procedure: Right Heart Cath with Leave In [place] already has ICU load;  Surgeon: Jak Mccabe MD;  [facility]    CV RIGHT HEART CATH MEASUREMENTS RECORDED N/A 7/14/2020    Procedure: CV RIGHT HEART CATH;  Surgeon: Sonny Saleh MD;  [facility]    CV RIGHT HEART CATH MEASUREMENTS RECORDED N/A 7/2/2020    Procedure: CV RIGHT HEART CATH;  Surgeon: Alex Lantigua MD;  [facility]    CV RIGHT HEART CATH MEASUREMENTS RECORDED N/A 7/27/2020    Procedure: Right Heart Cath;  Surgeon: Mario Burr MD;  [facility]    CV RIGHT HEART CATH MEASUREMENTS RECORDED N/A 8/3/2020    Procedure: Right Heart Cath;  Surgeon: Alex Lantigua MD;  [facility]    CV RIGHT HEART CATH MEASUREMENTS RECORDED N/A 8/10/2020     Procedure: CV RIGHT HEART CATH;  Surgeon: Mario Burr MD;  Location:  HEART CARDIAC CATH LAB    CV RIGHT HEART CATH MEASUREMENTS RECORDED N/A 8/17/2020    Procedure: CV RIGHT HEART CATH;  Surgeon: Raimundo Hudson MD;  Location:  HEART CARDIAC CATH LAB    CV RIGHT HEART CATH MEASUREMENTS RECORDED N/A 8/31/2020    Procedure: CV RIGHT HEART CATH;  Surgeon: Moises Santos MD;  Location:  HEART CARDIAC CATH LAB    CV RIGHT HEART CATH MEASUREMENTS RECORDED N/A 9/14/2020    Procedure: CV RIGHT HEART CATH;  Surgeon: Raimundo Hudson MD;  Location:  HEART CARDIAC CATH LAB    CV RIGHT HEART CATH MEASUREMENTS RECORDED N/A 9/28/2020    Procedure: CV RIGHT HEART CATH;  Surgeon: Raimundo Hudson MD;  Location:  HEART CARDIAC CATH LAB    CV RIGHT HEART CATH MEASUREMENTS RECORDED N/A 10/12/2020    Procedure: CV RIGHT HEART CATH;  Surgeon: John Stuart MD;  Location:  HEART CARDIAC CATH LAB    CV RIGHT HEART CATH MEASUREMENTS RECORDED N/A 11/10/2020    Procedure: CV RIGHT HEART CATH;  Surgeon: John Stuart MD;  Location:  HEART CARDIAC CATH LAB    CV RIGHT HEART CATH MEASUREMENTS RECORDED N/A 12/7/2020    Procedure: CV RIGHT HEART CATH;  Surgeon: Raimundo Hudson MD;  Location:  HEART CARDIAC CATH LAB    CV RIGHT HEART CATH MEASUREMENTS RECORDED N/A 1/5/2021    Procedure: CV RIGHT HEART CATH;  Surgeon: Raimundo Hudson MD;  Location:  HEART CARDIAC CATH LAB    CV RIGHT HEART CATH MEASUREMENTS RECORDED N/A 7/20/2021    Procedure: CV RIGHT HEART CATH;  Surgeon: Raimundo Hudson MD;  Location:  HEART CARDIAC CATH LAB    CV RIGHT HEART CATH MEASUREMENTS RECORDED N/A 9/28/2021    Procedure: CV RIGHT HEART CATH;  Surgeon: Raimundo Hudson MD;  Location:  HEART CARDIAC CATH LAB    CV RIGHT HEART CATH MEASUREMENTS RECORDED N/A 9/12/2022    Procedure: Right Heart Catheterization;  Surgeon: Raimundo Hudson  MD Dayron;  Location:  HEART CARDIAC CATH LAB    CV RIGHT HEART CATH MEASUREMENTS RECORDED N/A 7/17/2023    Procedure: Right Heart Catheterization;  Surgeon: Alex Lantigua MD;  Location:  HEART CARDIAC CATH LAB    EXTRACTION(S) DENTAL Left 7/13/2020    Procedure: EXTRACTION, TOOTH #11, 12, 13, 15, and 29;  Surgeon: Monica Chao DDS;  [facility]    IMPLANT AUTOMATIC IMPLANTABLE CARDIOVERTER DEFIBRILLATOR      INCISION AND DRAINAGE STERNUM W/ IRRIGATION SYSTEM, COMBINED N/A 9/23/2020    Procedure: INCISION AND DRAINAGE, LEFT SUPRACLAVICULAR WOUND INFECTION AND ABDOMENN WOUND.;  Surgeon: Ran Huertas MD;  [facility]    INSERT INTRAAORTIC BALLOON PUMP N/A 7/16/2020    Procedure: Subclavian Intra-Aortic Balloon Pump Placement;  Surgeon: Allan Sparrow MD;  [facility]    IRRIGATION AND DEBRIDEMENT CHEST WASHOUT, COMBINED N/A 9/28/2020    Procedure: IRRIGATION AND DEBRIDEMENT OF LEFT UPPER CHEST WOUND.;  Surgeon: Ran Huertas MD;  [facility]    PHACOEMULSIFICATION CLEAR CORNEA WITH STANDARD INTRAOCULAR LENS IMPLANT Right 2/6/2023    Procedure: RIGHT EYE PHACOEMULSIFICATION, CATARACT, WITH INTRAOCULAR LENS IMPLANT with trypan blue;  Surgeon: Triny Bunch MD;  [facility]    PHACOEMULSIFICATION CLEAR CORNEA WITH STANDARD IOL, VITRECTOMY PARSPLANA 25 GAGUE, COMBINED Left 12/10/2019    Procedure: PHACOEMULSIFICATION, CATARACT, CLEAR CORNEAL INCISION APPROACH, W STD INTRAOCULAR LENS IMPLANT INSERT + VITRECTOMY BY PARS PLANA  USING 25-GAUGE INSTRUMENTS. ENDOLASER, INFUSION OF 20% SF6 GAS;  Surgeon: Triny Bunch MD;  [facility]    PICC DOUBLE LUMEN PLACEMENT Left 07/28/2020    5Fr - 42cm, Basilic vein, mid SVC    PICC INSERTION Right 07/11/2020    basilic 44 cm total     TRANSPLANT HEART RECIPIENT N/A 7/19/2020    Procedure: REDO MEDIAN STERNOTOMY, TRANSPLANT, ORTHOTOPIC HEART, RECIPIENT, ON PUMP OXYGENATOR, REMOVAL OF CARDIAC DEFIBRILLATOR AND LEAD;  " Surgeon: Griselli, Massimo, MD;  [facility]    VITRECTOMY, PARS PLANA APPROACH, USING 27-GAUGE INSTRUMENTS Left 12/21/2020    Procedure: 27 gauge pars plana vitectomy, membrane peel, perfluoroctane liquid (PFO), retinectomy, endolaser, Silicone Oil 1000 cs;  Surgeon: Triny Bunch MD;  Location:  OR    RUST CABG, ARTERY-VEIN, THREE  02/2008       Physical Exam  /70 (BP Location: Right arm, Patient Position: Sitting, Cuff Size: Adult Large)   Pulse 97   Ht 1.651 m (5' 5\")   Wt 80.3 kg (177 lb)   SpO2 98%   BMI 29.45 kg/m    Body mass index is 29.45 kg/m .    FEET: no ulcers.    RESULTS  Creatinine   Date Value Ref Range Status   07/28/2023 2.52 (H) 0.67 - 1.17 mg/dL Final   05/11/2021 1.84 (H) 0.66 - 1.25 mg/dL Final     GFR Estimate   Date Value Ref Range Status   07/28/2023 27 (L) >60 mL/min/1.73m2 Final   05/11/2021 37 (L) >60 mL/min/[1.73_m2] Final     Comment:     Non  GFR Calc  Starting 12/18/2018, serum creatinine based estimated GFR (eGFR) will be   calculated using the Chronic Kidney Disease Epidemiology Collaboration   (CKD-EPI) equation.       Hemoglobin A1C   Date Value Ref Range Status   07/17/2023 8.7 (H) <5.7 % Final     Comment:     Normal <5.7%   Prediabetes 5.7-6.4%    Diabetes 6.5% or higher     Note: Adopted from ADA consensus guidelines.   01/14/2021 6.7 (H) 0 - 5.6 % Final     Comment:     Normal <5.7% Prediabetes 5.7-6.4%  Diabetes 6.5% or higher - adopted from ADA   consensus guidelines.       Potassium   Date Value Ref Range Status   07/28/2023 4.8 3.4 - 5.3 mmol/L Final   03/14/2022 4.4 3.4 - 5.3 mmol/L Final   05/11/2021 4.0 3.4 - 5.3 mmol/L Final     ALT   Date Value Ref Range Status   07/17/2023 17 0 - 70 U/L Final     Comment:     Reference intervals for this test were updated on 6/12/2023 to more accurately reflect our healthy population. There may be differences in the flagging of prior results with similar values performed with this method. " Interpretation of those prior results can be made in the context of the updated reference intervals.     05/11/2021 18 0 - 70 U/L Final     AST   Date Value Ref Range Status   07/17/2023 24 0 - 45 U/L Final     Comment:     Reference intervals for this test were updated on 6/12/2023 to more accurately reflect our healthy population. There may be differences in the flagging of prior results with similar values performed with this method. Interpretation of those prior results can be made in the context of the updated reference intervals.   05/11/2021 <3 0 - 45 U/L Final     TSH   Date Value Ref Range Status   08/05/2020 0.80 0.40 - 4.00 mU/L Final       Cholesterol   Date Value Ref Range Status   07/17/2023 92 <200 mg/dL Final   02/02/2023 119 <200 mg/dL Final   01/05/2021 133 <200 mg/dL Final   08/17/2020 118 <200 mg/dL Final     HDL Cholesterol   Date Value Ref Range Status   01/05/2021 40 >39 mg/dL Final   08/17/2020 75 >39 mg/dL Final     Direct Measure HDL   Date Value Ref Range Status   07/17/2023 30 (L) >=40 mg/dL Final   02/02/2023 32 (L) >=40 mg/dL Final     LDL Cholesterol Calculated   Date Value Ref Range Status   07/17/2023 26 <=100 mg/dL Final   02/02/2023 14 <=100 mg/dL Final   01/05/2021 58 <100 mg/dL Final     Comment:     Desirable:       <100 mg/dl   08/17/2020 26 <100 mg/dL Final     Comment:     Desirable:       <100 mg/dl     Triglycerides   Date Value Ref Range Status   07/17/2023 178 (H) <150 mg/dL Final   02/02/2023 365 (H) <150 mg/dL Final   01/05/2021 179 (H) <150 mg/dL Final     Comment:     Borderline high:  150-199 mg/dl  High:             200-499 mg/dl  Very high:       >499 mg/dl  Fasting specimen     08/17/2020 82 <150 mg/dL Final     Cholesterol/HDL Ratio   Date Value Ref Range Status   06/27/2015 2.6 0.0 - 5.0 Final   01/11/2015 6.0 (H) 0.0 - 5.0 Final         ASSESSMENT/PLAN:      TYPE 2 DIABETES MELLITUS: Uncontrolled type 2 diabetes mellitus. Pt's Cardiology staff started Jardiance  10 mg each am which pt has not started yet. Will see if SGLT-2 drug helps improve his postprandial blood sugars. Continue current insulin doses for now.  Also continue Trulicity 4.5 mg subcutaneous once a week.   RETINOPATHY: Severe proliferative diabetic retinopathy and has frequent follow up with Oph here.  NEPHROPATHY:Creat 1.95 with GFR 37 mL/min on 7/17/2023. Pt taking Lisinopril.  NEUROPATHY: Unchanged per patient. No foot ulcers.  HTN: /70 today.  LIPIDS: LDL 26 in July 2023. Pt taking Crestor.  OBESITY: Encouraged pt to make healthy food choices and continue to walk daily. Continue Trulicity.  FOLLOW UP: Appt with me or Marisabel Prieto in 2 months.    Time spent reviewing chart,labs and glucose data today = 8 minutes.  Time for clinic visit today = 25 minutes.  Time for documentation today = 15 minutes.    Total time for visit today = 48 minutes.    Jacque Smith PA-C

## 2023-07-27 ENCOUNTER — OFFICE VISIT (OUTPATIENT)
Dept: OPHTHALMOLOGY | Facility: CLINIC | Age: 68
End: 2023-07-27
Attending: OPHTHALMOLOGY
Payer: COMMERCIAL

## 2023-07-27 DIAGNOSIS — E11.3513 TYPE 2 DIABETES MELLITUS WITH PROLIFERATIVE RETINOPATHY OF BOTH EYES AND MACULAR EDEMA, UNSPECIFIED WHETHER LONG TERM INSULIN USE (H): Primary | ICD-10-CM

## 2023-07-27 PROCEDURE — G0463 HOSPITAL OUTPT CLINIC VISIT: HCPCS | Performed by: OPHTHALMOLOGY

## 2023-07-27 PROCEDURE — 92134 CPTRZ OPH DX IMG PST SGM RTA: CPT | Performed by: OPHTHALMOLOGY

## 2023-07-27 PROCEDURE — 99214 OFFICE O/P EST MOD 30 MIN: CPT | Mod: GC | Performed by: OPHTHALMOLOGY

## 2023-07-27 ASSESSMENT — VISUAL ACUITY
OD_CC+: -2
OD_CC: 20/25
METHOD: SNELLEN - LINEAR
OS_CC: HM

## 2023-07-27 ASSESSMENT — CONF VISUAL FIELD
OS_INFERIOR_NASAL_RESTRICTION: 1
OS_SUPERIOR_TEMPORAL_RESTRICTION: 1
OS_INFERIOR_TEMPORAL_RESTRICTION: 1
OS_SUPERIOR_NASAL_RESTRICTION: 1

## 2023-07-27 ASSESSMENT — EXTERNAL EXAM - LEFT EYE: OS_EXAM: NORMAL

## 2023-07-27 ASSESSMENT — SLIT LAMP EXAM - LIDS
COMMENTS: NORMAL
COMMENTS: NORMAL

## 2023-07-27 ASSESSMENT — TONOMETRY
OD_IOP_MMHG: 12
IOP_METHOD: ICARE
OS_IOP_MMHG: 10

## 2023-07-27 ASSESSMENT — EXTERNAL EXAM - RIGHT EYE: OD_EXAM: NORMAL

## 2023-07-27 ASSESSMENT — CUP TO DISC RATIO: OD_RATIO: 0.25

## 2023-07-27 NOTE — PROGRESS NOTES
CC: follow up proliferative diabetic retinopathy  s/p CEIOL OD     Interval:  follow up for PDR each eye, Vitreous hemorrhage right eye, Funnel RD left eye, and Ocular Hypertension each eye. Patient feels vision is stable each eye in the last several weeks.No flashes or floaters.  Ocular meds:   - Prednisolone & ketorolac once daily in left eye only. Latanoprost both eyes once daily.       HPI: 67 year old man PMHx of CAD (s/p 3v CABG 2008), ischemic CM, now s/p orthotopic heart transplant 07/2020, CKD, HTN, HLD, obesity, CHANTEL and IDDM2 with history of PDR both eyes who [facility]/17/2019 with bilateral vitreous hemorrhages.      POH: PPV/MP/retinectomy OS 12/21/20 Intraoperative, found to have longstanding funnel RD  NVI 06/02/2022  CEIOL OD 2/6/2023      RETINAL IMAGING  Optical Coherence Tomography: 07/27/23  Right eye: Increased mild cystoid macular edema.   Left eye: Irregular retina; ERM; nasal to fovea with large bullous edema-largely stable    FA 01/25/23   right eye: PRP scars. Foci of NV superonasal.     optos consistent with exam     Assessment and Plan    # s/p CEIOL OD 2/6/23. doing well.   IOP WNL    VA improved to 20/25 -2.   Optical Coherence Tomography 06/15/23 with worsening of mild cystoid macular edema  Can cont tapering meds slowly with monitoring.    -No drops OD   - Drops as above    # Proliferative Diabetic Retinopathy, right eye      # Vitreous hemorrhage, right eye -  Resolving (onset 4/2021)   - Responded to multiple Avastin, nearly resolved 08/04/2022   - R/B/A Fill-in PRP right eye 08/24/2022: #[id number] spots   -  09/14/2022 last Avastin right eye     # Proliferative Diabetic Retinopathy, left eye     - New NVI, left eye 6/2/2022   - Gonio 6/2/2022 broad PAS superiorly ~3 clock hours, narrow PAS inferiorly <1 clock hour, no NVA   - Regressing 08/04/2022   - IOP 08/04/2022 15   - s/p PRP 6/5/2019   - s/p avastin last injection 06/02/2022   - 03/09/23 VA stable CF2' -  continue to observe given chronic Retinal detachment     # Funnel RD left eye   - progressed to funnel RD after loss to follow up given heart surgeries     - w retina folded on itself under SO   - guarded prognosis previously discussed   - Drops as below    # Ocuar HTN, both eyes    - 03/09/23 IOP wnl with poor drop adherence. Stopped latanoprost to simplify regimen.    - 06/15/23 IOP 24/20.   -07/27/23 intraocular pressure under good control on latanoprost    PLAN:   -Will instruct the following to simplify drop regimen  - Continue latanoprost at bedtime both eyes   - prednisolone 1x daily left eye  -Restart Ketorolac 1x daily right eye   Retina detachment precautions were discussed with the patient (presence or increased in flashes, floaters or a curtain in the visual field) and was asked to return if any of the those occur    RTC: 3 months, dilate OU, OCT macula, Optos       Mathew Cordoba MD, MPH  Vitreoretinal Fellow PGY-6  [facility]    ~~~~~~~~~~~~~~~~~~~~~~~~~~~~~~~~~~   Complete documentation of historical and exam elements from today's encounter can be found in the full encounter summary report (not reduplicated in this progress note).  I personally obtained the chief complaint(s) and history of present illness.  I confirmed and edited as necessary the review of systems, past medical/surgical history, family history, social history, and examination findings as documented by others; and I examined the patient myself.  I personally reviewed the relevant tests, images, and reports as documented above.  I personally reviewed the ophthalmic test(s) associated with this encounter, agree with the interpretation(s) as documented by the resident/fellow, and have edited the corresponding report(s) as necessary.   I formulated and edited as necessary the assessment and plan and discussed the findings and management plan with the patient and family    Triny Bunch MD  Professor of  Ophthalmology  Vitreo-Retinal surgeon   Department of Ophthalmology and Visual Neurosciences   [facility]  Phone: [phone]   Fax: [phone]

## 2023-07-27 NOTE — NURSING NOTE
Chief Complaints and History of Present Illnesses   Patient presents with    Follow Up     Chief Complaint(s) and History of Present Illness(es)       Follow Up              Laterality: both eyes    Associated symptoms: Negative for eye pain, flashes and floaters    Treatments tried: eye drops              Comments    Here for follow up. Vision is gradually decreasing. Compliant with drops. No flashes or floaters. No pain.    Lab Results       Component                Value               Date                       A1C                      8.7                 07/17/2023                 A1C                      8.3                 02/02/2023                 A1C                      8.4                 09/12/2022                 A1C                      7.4                 03/14/2022                 A1C                      7.3                 11/17/2021                 A1C                      6.7                 01/14/2021                 A1C                      5.9                 12/07/2020                 A1C                      8.2                 07/03/2020                 A1C                      7.9                 07/08/2019                 A1C                      8.5                 03/11/2019              Lisandro Moralez COT 8:57 AM July 27, 2023

## 2023-07-27 NOTE — PATIENT INSTRUCTIONS
DROPS:    BOTH EYES  -Ketorolac (GRAY TOP) 1x daily   -Pred Forte (PINK or WHITE TOP MILKY DROP) 1x daily  -Latanoprost (GREEN or TEAL TOP DROP) 1x at bedtime

## 2023-07-28 ENCOUNTER — TELEPHONE (OUTPATIENT)
Dept: TRANSPLANT | Facility: CLINIC | Age: 68
End: 2023-07-28

## 2023-07-28 ENCOUNTER — LAB (OUTPATIENT)
Dept: LAB | Facility: CLINIC | Age: 68
End: 2023-07-28
Payer: COMMERCIAL

## 2023-07-28 DIAGNOSIS — N18.32 STAGE 3B CHRONIC KIDNEY DISEASE (CKD) (H): ICD-10-CM

## 2023-07-28 DIAGNOSIS — Z79.899 ENCOUNTER FOR LONG-TERM (CURRENT) USE OF HIGH-RISK MEDICATION: ICD-10-CM

## 2023-07-28 DIAGNOSIS — N18.4 CKD (CHRONIC KIDNEY DISEASE) STAGE 4, GFR 15-29 ML/MIN (H): ICD-10-CM

## 2023-07-28 DIAGNOSIS — Z94.1 HEART REPLACED BY TRANSPLANT (H): ICD-10-CM

## 2023-07-28 LAB
ALBUMIN MFR UR ELPH: 10.9 MG/DL
ALBUMIN UR-MCNC: NEGATIVE MG/DL
ANION GAP SERPL CALCULATED.3IONS-SCNC: 8 MMOL/L (ref 7–15)
APPEARANCE UR: CLEAR
BASOPHILS # BLD AUTO: 0 10E3/UL (ref 0–0.2)
BASOPHILS NFR BLD AUTO: 0 %
BILIRUB UR QL STRIP: NEGATIVE
BUN SERPL-MCNC: 33.6 MG/DL (ref 8–23)
CALCIUM SERPL-MCNC: 9 MG/DL (ref 8.8–10.2)
CHLORIDE SERPL-SCNC: 99 MMOL/L (ref 98–107)
COLOR UR AUTO: ABNORMAL
CREAT SERPL-MCNC: 2.52 MG/DL (ref 0.67–1.17)
CREAT UR-MCNC: 101 MG/DL
CREAT UR-MCNC: 103 MG/DL
CYSTATIN C (ROCHE): 2.3 MG/L (ref 0.6–1)
DEPRECATED HCO3 PLAS-SCNC: 25 MMOL/L (ref 22–29)
EOSINOPHIL # BLD AUTO: 0 10E3/UL (ref 0–0.7)
EOSINOPHIL NFR BLD AUTO: 1 %
ERYTHROCYTE [DISTWIDTH] IN BLOOD BY AUTOMATED COUNT: 14.9 % (ref 10–15)
FERRITIN SERPL-MCNC: 190 NG/ML (ref 31–409)
GFR SERPL CREATININE-BSD FRML MDRD: 25 ML/MIN/1.73M2
GFR SERPL CREATININE-BSD FRML MDRD: 27 ML/MIN/1.73M2
GLUCOSE SERPL-MCNC: 246 MG/DL (ref 70–99)
GLUCOSE UR STRIP-MCNC: 100 MG/DL
HCT VFR BLD AUTO: 30.1 % (ref 40–53)
HGB BLD-MCNC: 9.6 G/DL (ref 13.3–17.7)
HGB UR QL STRIP: NEGATIVE
HYALINE CASTS: 11 /LPF
IMM GRANULOCYTES # BLD: 0 10E3/UL
IMM GRANULOCYTES NFR BLD: 1 %
IRON BINDING CAPACITY (ROCHE): 227 UG/DL (ref 240–430)
IRON SATN MFR SERPL: 35 % (ref 15–46)
IRON SERPL-MCNC: 80 UG/DL (ref 61–157)
KETONES UR STRIP-MCNC: NEGATIVE MG/DL
LEUKOCYTE ESTERASE UR QL STRIP: NEGATIVE
LYMPHOCYTES # BLD AUTO: 1.1 10E3/UL (ref 0.8–5.3)
LYMPHOCYTES NFR BLD AUTO: 21 %
MCH RBC QN AUTO: 27.9 PG (ref 26.5–33)
MCHC RBC AUTO-ENTMCNC: 31.9 G/DL (ref 31.5–36.5)
MCV RBC AUTO: 88 FL (ref 78–100)
MICROALBUMIN UR-MCNC: 21.4 MG/L
MICROALBUMIN/CREAT UR: 21.19 MG/G CR (ref 0–17)
MONOCYTES # BLD AUTO: 0.5 10E3/UL (ref 0–1.3)
MONOCYTES NFR BLD AUTO: 9 %
MUCOUS THREADS #/AREA URNS LPF: PRESENT /LPF
NEUTROPHILS # BLD AUTO: 3.3 10E3/UL (ref 1.6–8.3)
NEUTROPHILS NFR BLD AUTO: 68 %
NITRATE UR QL: NEGATIVE
NRBC # BLD AUTO: 0 10E3/UL
NRBC BLD AUTO-RTO: 0 /100
PH UR STRIP: 5.5 [PH] (ref 5–7)
PLATELET # BLD AUTO: 225 10E3/UL (ref 150–450)
POTASSIUM SERPL-SCNC: 4.8 MMOL/L (ref 3.4–5.3)
PROT/CREAT 24H UR: 0.11 MG/MG CR (ref 0–0.2)
RBC # BLD AUTO: 3.44 10E6/UL (ref 4.4–5.9)
RBC URINE: <1 /HPF
SIROLIMUS BLD-MCNC: 6.2 UG/L (ref 5–15)
SODIUM SERPL-SCNC: 132 MMOL/L (ref 136–145)
SP GR UR STRIP: 1.01 (ref 1–1.03)
SQUAMOUS EPITHELIAL: <1 /HPF
TME LAST DOSE: NORMAL H
TME LAST DOSE: NORMAL H
UROBILINOGEN UR STRIP-MCNC: NORMAL MG/DL
VIT B12 SERPL-MCNC: 809 PG/ML (ref 232–1245)
WBC # BLD AUTO: 4.9 10E3/UL (ref 4–11)
WBC URINE: <1 /HPF

## 2023-07-28 PROCEDURE — 82607 VITAMIN B-12: CPT | Performed by: INTERNAL MEDICINE

## 2023-07-28 PROCEDURE — 84156 ASSAY OF PROTEIN URINE: CPT | Performed by: PATHOLOGY

## 2023-07-28 PROCEDURE — 85025 COMPLETE CBC W/AUTO DIFF WBC: CPT | Performed by: PATHOLOGY

## 2023-07-28 PROCEDURE — 83540 ASSAY OF IRON: CPT | Performed by: INTERNAL MEDICINE

## 2023-07-28 PROCEDURE — 81001 URINALYSIS AUTO W/SCOPE: CPT | Performed by: PATHOLOGY

## 2023-07-28 PROCEDURE — 99000 SPECIMEN HANDLING OFFICE-LAB: CPT | Performed by: PATHOLOGY

## 2023-07-28 PROCEDURE — 36415 COLL VENOUS BLD VENIPUNCTURE: CPT | Performed by: PATHOLOGY

## 2023-07-28 PROCEDURE — 80048 BASIC METABOLIC PNL TOTAL CA: CPT | Performed by: PATHOLOGY

## 2023-07-28 PROCEDURE — 83550 IRON BINDING TEST: CPT | Performed by: INTERNAL MEDICINE

## 2023-07-28 PROCEDURE — 82610 CYSTATIN C: CPT | Performed by: INTERNAL MEDICINE

## 2023-07-28 PROCEDURE — 82570 ASSAY OF URINE CREATININE: CPT | Performed by: INTERNAL MEDICINE

## 2023-07-28 PROCEDURE — 82728 ASSAY OF FERRITIN: CPT | Performed by: INTERNAL MEDICINE

## 2023-07-28 PROCEDURE — 80195 ASSAY OF SIROLIMUS: CPT | Performed by: INTERNAL MEDICINE

## 2023-07-28 NOTE — TELEPHONE ENCOUNTER
Patient is calling and asking for you, I asked for the reason of call and he wouldn't answer me, please call him back when you are able.

## 2023-07-28 NOTE — TELEPHONE ENCOUNTER
Call returned to pt using .  Pt states he will run of of Sirolimus tomorrow and has not received his refill yet.  Pt states he has not called the pharmacy regarding this.  Writer call  [facility] to request delivery of medication- they will deliver this weekend.  Pt instructed to call on-call coordinator this weekend if shipment does not arrive.  Pt states understanding instructions.

## 2023-07-31 NOTE — PROGRESS NOTES
HPI        Diabetes Care    Retinopathy:     Nephropathy:     Neuropathy:     Foot Exam:     Taking aspirin:     Lipids: LDL 26 in 7/2023.           ROS  See under HPI.    Allergies  Allergies   Allergen Reactions    Heparin Heparin Induced Thrombocytopenia     HIT screen sent 7/18/2020. CORRINE confirmed positive       Medications  Current Outpatient Medications   Medication Sig Dispense Refill    acetaminophen (TYLENOL) 325 MG tablet Take 3 tablets (975 mg) by mouth every 8 hours as needed for mild pain 60 tablet 3    Alcohol Swabs PADS Use to swab the area of the injection or sergio as directed   Per insurance coverage 100 each 0    aspirin (ASA) 81 MG chewable tablet Take 1 tablet (81 mg) by mouth daily (Patient taking differently: Take 81 mg by mouth every morning) 120 tablet 0    blood glucose (NO BRAND SPECIFIED) test strip Use to test blood sugar 4 times daily or as directed. 400 strip 3    blood glucose monitoring (ACCU-CHEK FELIPE SMARTVIEW) meter device kit Use to test blood sugar 3-4 times daily, as directed. 1 kit 0    blood glucose monitoring (SOFTCLIX) lancets 1 each 4 times daily Use to test blood sugars 2 times daily. 400 each 8    Calcium Carb-Cholecalciferol (CALCIUM CARBONATE-VITAMIN D3) 600-400 MG-UNIT TABS TAKE ONE TABLET BY MOUTH TWICE A DAY WITH MEALS 180 tablet 3    Dulaglutide (TRULICITY) 4.5 MG/0.5ML SOPN Inject 4.5 mg Subcutaneous once a week 6 mL 1    empagliflozin (JARDIANCE) 10 MG TABS tablet Take 1 tablet (10 mg) by mouth daily 90 tablet 3    ferrous sulfate (FEROSUL) 325 (65 Fe) MG tablet Take 1 tablet (325 mg) by mouth daily (with breakfast) 90 tablet 3    furosemide (LASIX) 20 MG tablet Take 2 tablets (40 mg) by mouth daily 180 tablet 3    HUMALOG KWIKPEN 100 UNIT/ML soln Inject 5-20 Units Subcutaneous 3 times daily (before meals) Max daily dose 60 units. 60 mL 1    HUMALOG KWIKPEN 100 UNIT/ML soln INJECT 8 TO 14 UNITS UNDER THE SKIN THREE TIMES DAILY BEFORE A MEAL PER SLIDING SCALE AS  DIRECTED 15 mL 1    insulin NPH (HUMULIN N KWIKPEN) 100 UNIT/ML injection GIve 40 units each morning and 12 at night subcutaneous 45 mL 1    insulin  UNIT/ML injection GIve 40 units each morning and 12-16  units each evening BEFORE meals (Patient taking differently: Inject 40 Units Subcutaneous 2 times daily GIve 40 units each morning and 12 at night) 60 mL 3    insulin pen needle (32G X 4 MM) 32G X 4 MM miscellaneous Use 5-6 pen needles daily or as directed. 450 each 3    ketorolac (ACULAR) 0.5 % ophthalmic solution Place 1 drop into the right eye 2 times daily 10 mL 3    lisinopril (ZESTRIL) 10 MG tablet Take 1 tablet (10 mg) by mouth 2 times daily 60 tablet 11    magnesium oxide (MAG-OX) 400 (241.3 Mg) MG tablet TAKE ONE TABLET BY MOUTH TWICE A  tablet 3    magnesium oxide 400 MG tablet Take 1 tablet (400 mg) by mouth 2 times daily 180 tablet 1    mycophenolate (GENERIC EQUIVALENT) 500 MG tablet Take 3 tablets (1,500 mg) by mouth 2 times daily 180 tablet 11    ofloxacin (OCUFLOX) 0.3 % ophthalmic solution Place 1 drop into the right eye 4 times daily 5 mL 0    omega-3 acid ethyl esters (LOVAZA) 1 g capsule Take 1 capsule (1 g) by mouth daily 90 capsule 3    polyethylene glycol (GOLYTELY) 236 g suspension The night before the exam at 6 pm drink an 8-ounce glass every 15 minutes until the jug is half empty. If you arrive before 11 AM: Drink the other half of the Golytely jug at 11 PM night before procedure. If you arrive after 11 AM: Drink the other half of the Golytely jug at 6 AM day of procedure. For additional instructions refer to your colonoscopy prep instructions. 4000 mL 0    prednisoLONE acetate (PRED FORTE) 1 % ophthalmic suspension Place 1 drop into the right eye 2 times daily 10 mL 3    prednisoLONE acetate (PRED FORTE) 1 % ophthalmic suspension Place 1 drop Into the left eye daily 10 mL 2    prednisoLONE acetate (PRED FORTE) 1 % ophthalmic suspension Place 1-2 drops Into the left eye 2  times daily (Patient taking differently: Place 1 drop Into the left eye every other day) 1 Bottle 1    rosuvastatin (CRESTOR) 20 MG tablet Take 1 tablet (20 mg) by mouth daily 90 tablet 3    senna-docusate (SENOKOT-S/PERICOLACE) 8.6-50 MG tablet Take 1 tablet by mouth 2 times daily as needed (hold for loose stools) 60 tablet 3    sirolimus (GENERIC EQUIVALENT) 0.5 MG tablet Take 6 tablets (3 mg) by mouth daily 180 tablet 11    tamsulosin (FLOMAX) 0.4 MG capsule TAKE ONE CAPSULE BY MOUTH ONCE DAILY 90 capsule 3    order for DME Auto CPAP 8-15 cmH20 1 Units 0       Family History  family history includes Diabetes in his brother, sister, and sister.    Social History   reports that he has never smoked. He has never used smokeless tobacco. He reports that he does not drink alcohol and does not use drugs.     Past Medical History  Past Medical History:   Diagnosis Date    CAD (coronary artery disease)     CHF (congestive heart failure) (H)     CKD (chronic kidney disease), stage III (H)     Cortical cataract of both eyes     Diabetes (H)     Hyperlipidemia     Hypertension     Infection due to Streptococcus mitis group 09/23/2020    Ischemic cardiomyopathy     Obesity     CHANTEL (obstructive sleep apnea)     occas cpap    CHANTEL (obstructive sleep apnea)- severe (AHI 30)     Osteoarthritis        Past Surgical History:   Procedure Laterality Date    CATARACT IOL, RT/LT      COLONOSCOPY N/A 8/7/2019    Procedure: COLONOSCOPY, WITH POLYPECTOMY AND BIOPSY;  Surgeon: Chauncey Morataya MD;  Location:  GI    COLONOSCOPY N/A 5/12/2023    Procedure: Colonoscopy;  Surgeon: Mariano Velasquez MD;  Location:  GI    CV ANGIOGRAM CORONARY GRAFT N/A 7/2/2020    Procedure: Angiogram Coronary Graft;  Surgeon: Alex Lantigua MD;  Location: [facility] CARDIAC CATH LAB    CV CORONARY ANGIOGRAM N/A 7/2/2020    Procedure: CV CORONARY ANGIOGRAM;  Surgeon: Alex Lantigua MD;  Location: [facility] CARDIAC CATH LAB    CV  CORONARY ANGIOGRAM N/A 7/20/2021    Procedure: CV CORONARY ANGIOGRAM;  Surgeon: Raimundo Hudson MD;  [facility]    CV CORONARY ANGIOGRAM N/A 9/12/2022    Procedure: Coronary Angiogram- BIPLANE;  Surgeon: Raimundo Hudson MD;  [facility]    CV CORONARY ANGIOGRAM N/A 7/17/2023    Procedure: Coronary Angiogram;  Surgeon: Alex Lantigua MD;  [facility]    CV HEART BIOPSY N/A 7/27/2020    Procedure: Heart Biopsy;  Surgeon: Mario Burr MD;  [facility]    CV HEART BIOPSY N/A 8/3/2020    Procedure: CV HEART BIOPSY;  Surgeon: Alex Lantigua MD;  [facility]    CV HEART BIOPSY N/A 8/10/2020    Procedure: CV HEART BIOPSY;  Surgeon: Mario Burr MD;  [facility]    CV HEART BIOPSY N/A 8/17/2020    Procedure: CV HEART BIOPSY;  Surgeon: Raimundo Hudson MD;  [facility]    CV HEART BIOPSY N/A 8/31/2020    Procedure: CV HEART BIOPSY;  Surgeon: Moises Santos MD;  [facility]    CV HEART BIOPSY N/A 9/14/2020    Procedure: CV HEART BIOPSY;  Surgeon: Raimundo Hudson MD;  [facility]    CV HEART BIOPSY N/A 9/28/2020    Procedure: CV HEART BIOPSY;  Surgeon: Raimundo Hudson MD;  [facility]    CV HEART BIOPSY N/A 10/12/2020    Procedure: CV HEART BIOPSY;  Surgeon: John Stuart MD;  [facility]    CV HEART BIOPSY N/A 11/10/2020    Procedure: CV HEART BIOPSY;  Surgeon: John Stuart MD;  [facility]    CV HEART BIOPSY N/A 12/7/2020    Procedure: CV HEART BIOPSY;  Surgeon: Raimundo Hudson MD;  Location:  HEART CARDIAC CATH LAB    CV HEART BIOPSY N/A 1/5/2021    Procedure: CV HEART BIOPSY;  Surgeon: Raimundo Hudson,  MD;  Location:  HEART CARDIAC CATH LAB    CV HEART BIOPSY N/A 7/20/2021    Procedure: CV HEART BIOPSY;  Surgeon: Raimundo Hudson MD;  Location:  HEART CARDIAC CATH LAB    CV HEART BIOPSY N/A 9/28/2021    Procedure: CV HEART BIOPSY;  Surgeon: Raimundo Hudson MD;  Location:  HEART CARDIAC CATH LAB    CV HEART BIOPSY N/A 9/12/2022    Procedure: Heart Biopsy;  Surgeon: Raimundo Hudson MD;  Location:  [facility]ART CARDIAC CATH LAB    CV HEART BIOPSY N/A 7/17/2023    Procedure: Heart Biopsy;  Surgeon: Alex Lantigua MD;  Location:  HEART CARDIAC CATH LAB    CV INTRA AORTIC BALLOON N/A 7/14/2020    Procedure: RHC WITH LEAVE IN SWAN/IABP;  Surgeon: Sonny Saleh MD;  Location:  HEART CARDIAC CATH LAB    CV INTRAVASULAR ULTRASOUND N/A 9/12/2022    Procedure: Intravascular Ultrasound;  Surgeon: Raimundo Hudson MD;  Location:  HEART CARDIAC CATH LAB    CV RIGHT HEART CATH MEASUREMENTS RECORDED N/A 3/25/2019    Procedure: CV RIGHT HEART CATH;  Surgeon: Moises Santos MD;  Location:  HEART CARDIAC CATH LAB    CV RIGHT HEART CATH MEASUREMENTS RECORDED N/A 7/10/2019    Procedure: CV RIGHT HEART CATH;  Surgeon: Jak Mccabe MD;  Location:  HEART CARDIAC CATH LAB    CV RIGHT HEART CATH MEASUREMENTS RECORDED N/A 7/8/2020    Procedure: Right Heart Cath with Leave In swan already has ICU load;  Surgeon: Jak Mccabe MD;  Location:  HEART CARDIAC CATH LAB    CV RIGHT HEART CATH MEASUREMENTS RECORDED N/A 7/14/2020    Procedure: CV RIGHT HEART CATH;  Surgeon: Sonny Saleh MD;  Location:  HEART CARDIAC CATH LAB    CV RIGHT HEART CATH MEASUREMENTS RECORDED N/A 7/2/2020    Procedure: CV RIGHT HEART CATH;  Surgeon: Alex Lantigua MD;  Location:  HEART CARDIAC CATH LAB    CV RIGHT HEART CATH MEASUREMENTS RECORDED N/A 7/27/2020    Procedure: Right Heart Cath;  Surgeon: Mario Burr MD;  Location:  HEART CARDIAC CATH  LAB    CV RIGHT HEART CATH MEASUREMENTS RECORDED N/A 8/3/2020    Procedure: Right Heart Cath;  Surgeon: Alex Lantigua MD;  Location:  HEART CARDIAC CATH LAB    CV RIGHT HEART CATH MEASUREMENTS RECORDED N/A 8/10/2020    Procedure: CV RIGHT HEART CATH;  Surgeon: Mario Burr MD;  Location:  HEART CARDIAC CATH LAB    CV RIGHT HEART CATH MEASUREMENTS RECORDED N/A 8/17/2020    Procedure: CV RIGHT HEART CATH;  Surgeon: Raimundo uHdson MD;  Location:  HEART CARDIAC CATH LAB    CV RIGHT HEART CATH MEASUREMENTS RECORDED N/A 8/31/2020    Procedure: CV RIGHT HEART CATH;  Surgeon: Moises Santos MD;  Location:  HEART CARDIAC CATH LAB    CV RIGHT HEART CATH MEASUREMENTS RECORDED N/A 9/14/2020    Procedure: CV RIGHT HEART CATH;  Surgeon: Raimundo Hudson MD;  Location:  HEART CARDIAC CATH LAB    CV RIGHT HEART CATH MEASUREMENTS RECORDED N/A 9/28/2020    Procedure: CV RIGHT HEART CATH;  Surgeon: Raimundo Hudson MD;  Location:  HEART CARDIAC CATH LAB    CV RIGHT HEART CATH MEASUREMENTS RECORDED N/A 10/12/2020    Procedure: CV RIGHT HEART CATH;  Surgeon: John Stuart MD;  Location:  HEART CARDIAC CATH LAB    CV RIGHT HEART CATH MEASUREMENTS RECORDED N/A 11/10/2020    Procedure: CV RIGHT HEART CATH;  Surgeon: John Stuart MD;  Location:  HEART CARDIAC CATH LAB    CV RIGHT HEART CATH MEASUREMENTS RECORDED N/A 12/7/2020    Procedure: CV RIGHT HEART CATH;  Surgeon: Raimundo Hudson MD;  Location:  HEART CARDIAC CATH LAB    CV RIGHT HEART CATH MEASUREMENTS RECORDED N/A 1/5/2021    Procedure: CV RIGHT HEART CATH;  Surgeon: Raimundo Hudson MD;  Location:  HEART CARDIAC CATH LAB    CV RIGHT HEART CATH MEASUREMENTS RECORDED N/A 7/20/2021    Procedure: CV RIGHT HEART CATH;  Surgeon: Raimundo Hudson MD;  Location:  HEART CARDIAC CATH LAB    CV RIGHT HEART CATH MEASUREMENTS RECORDED N/A 9/28/2021    Procedure: CV  RIGHT HEART CATH;  Surgeon: Raimundo Hudson MD;  Location:  HEART CARDIAC CATH LAB    CV RIGHT HEART CATH MEASUREMENTS RECORDED N/A 9/12/2022    Procedure: Right Heart Catheterization;  Surgeon: Raimundo Hudson MD;  Location:  HEART CARDIAC CATH LAB    CV RIGHT HEART CATH MEASUREMENTS RECORDED N/A 7/17/2023    Procedure: Right Heart Catheterization;  Surgeon: Alex Lantigua MD;  Location:  HEART CARDIAC CATH LAB    EXTRACTION(S) DENTAL Left 7/13/2020    Procedure: EXTRACTION, TOOTH #11, 12, 13, 15, and 29;  Surgeon: Monica Chao DDS;  [facility]    IMPLANT AUTOMATIC IMPLANTABLE CARDIOVERTER DEFIBRILLATOR      INCISION AND DRAINAGE STERNUM W/ IRRIGATION SYSTEM, COMBINED N/A 9/23/2020    Procedure: INCISION AND DRAINAGE, LEFT SUPRACLAVICULAR WOUND INFECTION AND ABDOMENN WOUND.;  Surgeon: Ran Huertas MD;  [facility]    INSERT INTRAAORTIC BALLOON PUMP N/A 7/16/2020    Procedure: Subclavian Intra-Aortic Balloon Pump Placement;  Surgeon: Allan Sparrow MD;  [facility]    IRRIGATION AND DEBRIDEMENT CHEST WASHOUT, COMBINED N/A 9/28/2020    Procedure: IRRIGATION AND DEBRIDEMENT OF LEFT UPPER CHEST WOUND.;  Surgeon: Ran Huertas MD;  [facility]    PHACOEMULSIFICATION CLEAR CORNEA WITH STANDARD INTRAOCULAR LENS IMPLANT Right 2/6/2023    Procedure: RIGHT EYE PHACOEMULSIFICATION, CATARACT, WITH INTRAOCULAR LENS IMPLANT with trypan blue;  Surgeon: Triny Bunch MD;  [facility]    PHACOEMULSIFICATION CLEAR CORNEA WITH STANDARD IOL, VITRECTOMY PARSPLANA 25 GAGUE, COMBINED Left 12/10/2019    Procedure: PHACOEMULSIFICATION, CATARACT, CLEAR CORNEAL INCISION APPROACH, W STD INTRAOCULAR LENS IMPLANT INSERT + VITRECTOMY BY PARS PLANA  USING 25-GAUGE INSTRUMENTS. ENDOLASER, INFUSION OF 20% SF6 GAS;  Surgeon: Triny Bunch MD;  [facility]    PICC DOUBLE LUMEN PLACEMENT Left 07/28/2020    5Fr - 42cm, Basilic vein, mid SVC    PICC  "INSERTION Right 07/11/2020    basilic 44 cm total     TRANSPLANT HEART RECIPIENT N/A 7/19/2020    Procedure: REDO MEDIAN STERNOTOMY, TRANSPLANT, ORTHOTOPIC HEART, RECIPIENT, ON PUMP OXYGENATOR, REMOVAL OF CARDIAC DEFIBRILLATOR AND LEAD;  Surgeon: Griselli, Massimo, MD;  [facility]    VITRECTOMY, PARS PLANA APPROACH, USING 27-GAUGE INSTRUMENTS Left 12/21/2020    Procedure: 27 gauge pars plana vitectomy, membrane peel, perfluoroctane liquid (PFO), retinectomy, endolaser, Silicone Oil 1000 cs;  Surgeon: Triny Bunch MD;  Location:  OR    [facility] CABG, ARTERY-VEIN, THREE  02/2008       Physical Exam  /70 (BP Location: Right arm, Patient Position: Sitting, Cuff Size: Adult Large)   Pulse 97   Ht 1.651 m (5' 5\")   Wt 80.3 kg (177 lb)   SpO2 98%   BMI 29.45 kg/m    Body mass index is 29.45 kg/m .    FEET: no ulcers.     RESULTS  Creatinine   Date Value Ref Range Status   07/28/2023 2.52 (H) 0.67 - 1.17 mg/dL Final   05/11/2021 1.84 (H) 0.66 - 1.25 mg/dL Final     GFR Estimate   Date Value Ref Range Status   07/28/2023 27 (L) >60 mL/min/1.73m2 Final   05/11/2021 37 (L) >60 mL/min/[1.73_m2] Final     Comment:     Non  GFR Calc  Starting 12/18/2018, serum creatinine based estimated GFR (eGFR) will be   calculated using the Chronic Kidney Disease Epidemiology Collaboration   (CKD-EPI) equation.       Hemoglobin A1C   Date Value Ref Range Status   07/17/2023 8.7 (H) <5.7 % Final     Comment:     Normal <5.7%   Prediabetes 5.7-6.4%    Diabetes 6.5% or higher     Note: Adopted from ADA consensus guidelines.   01/14/2021 6.7 (H) 0 - 5.6 % Final     Comment:     Normal <5.7% Prediabetes 5.7-6.4%  Diabetes 6.5% or higher - adopted from ADA   consensus guidelines.       Potassium   Date Value Ref Range Status   07/28/2023 4.8 3.4 - 5.3 mmol/L Final   03/14/2022 4.4 3.4 - 5.3 mmol/L Final   05/11/2021 4.0 3.4 - 5.3 mmol/L Final     ALT   Date Value Ref Range Status   07/17/2023 17 0 - 70 U/L " Final     Comment:     Reference intervals for this test were updated on 6/12/2023 to more accurately reflect our healthy population. There may be differences in the flagging of prior results with similar values performed with this method. Interpretation of those prior results can be made in the context of the updated reference intervals.     05/11/2021 18 0 - 70 U/L Final     AST   Date Value Ref Range Status   07/17/2023 24 0 - 45 U/L Final     Comment:     Reference intervals for this test were updated on 6/12/2023 to more accurately reflect our healthy population. There may be differences in the flagging of prior results with similar values performed with this method. Interpretation of those prior results can be made in the context of the updated reference intervals.   05/11/2021 <3 0 - 45 U/L Final     TSH   Date Value Ref Range Status   08/05/2020 0.80 0.40 - 4.00 mU/L Final       Cholesterol   Date Value Ref Range Status   07/17/2023 92 <200 mg/dL Final   02/02/2023 119 <200 mg/dL Final   01/05/2021 133 <200 mg/dL Final   08/17/2020 118 <200 mg/dL Final     HDL Cholesterol   Date Value Ref Range Status   01/05/2021 40 >39 mg/dL Final   08/17/2020 75 >39 mg/dL Final     Direct Measure HDL   Date Value Ref Range Status   07/17/2023 30 (L) >=40 mg/dL Final   02/02/2023 32 (L) >=40 mg/dL Final     LDL Cholesterol Calculated   Date Value Ref Range Status   07/17/2023 26 <=100 mg/dL Final   02/02/2023 14 <=100 mg/dL Final   01/05/2021 58 <100 mg/dL Final     Comment:     Desirable:       <100 mg/dl   08/17/2020 26 <100 mg/dL Final     Comment:     Desirable:       <100 mg/dl     Triglycerides   Date Value Ref Range Status   07/17/2023 178 (H) <150 mg/dL Final   02/02/2023 365 (H) <150 mg/dL Final   01/05/2021 179 (H) <150 mg/dL Final     Comment:     Borderline high:  150-199 mg/dl  High:             200-499 mg/dl  Very high:       >499 mg/dl  Fasting specimen     08/17/2020 82 <150 mg/dL Final     Cholesterol/HDL  Ratio   Date Value Ref Range Status   06/27/2015 2.6 0.0 - 5.0 Final   01/11/2015 6.0 (H) 0.0 - 5.0 Final         ASSESSMENT/PLAN:

## 2023-07-31 NOTE — PROGRESS NOTES
HPI  Lucian Oliveira is a 67 year old male with type 2 diabetes mellitus here today in clinic for a diabetes follow up visit.  His wife is present and a Vietnamese speaking  was on the phone during our visit today.  Pt last seen by Marisabel Prieto PA-C in May 2023.  Type 2 DM > 20 years complicated by proliferative diabetic retinopathy with several eye procedures, nephropathy, neuropathy, hx of CAD- ischemic cardiomyopathy and  s/p CABG.  S/P heart transplant on 7/19/2020.   Pt also has hx of CHF, HTN, hyperlipidemia, obesity and CHANTEL.  For his diabetes, he tells me he is taking Trulicity 4.5 mg subcutaneous once a week, Humulin NPH 40 units in am and 12 units in pm, Humalog 12-14 with meals and he was given a RX for Jardiance 10 mg daily by Cardiologist which he plans to get from the pharmacy in a few days.  Pt's A1C was 8.7 % on 7/17/2023.  He provided me a few blood sugar readings from his glucose meter and his postprandial blood sugars are the highest. No hypoglycemia.  On ROS today, poor vision.   He walks daily.  Some LE swelling.   Denies n/v, SOB at rest, cough or chest pain.  No abd pain or diarrhea.  Denies dysuria.   No foot ulcers.    Diabetes Care  Retinopathy: yes; seen by Oph on 7/18/2023.  Nephropathy: yes. Pt taking Lisinopril.  Neuropathy: yes.  Foot Exam: no ulcers.  Taking aspirin: yes.  Lipids: LDL 26 in July 2023. Pt taking Crestor.  Insulin: Humulin NPH BID and Humalog meal time insulin.  DM meds: Trulicity and Jardiance.   Testing : glucose meter.         ROS  See under HPI.    Allergies  Allergies   Allergen Reactions    Heparin Heparin Induced Thrombocytopenia     HIT screen sent 7/18/2020. CORRINE confirmed positive       Medications  Current Outpatient Medications   Medication Sig Dispense Refill    acetaminophen (TYLENOL) 325 MG tablet Take 3 tablets (975 mg) by mouth every 8 hours as needed for mild pain 60 tablet 3    Alcohol Swabs PADS Use to swab the area of the injection or sergio as  directed   Per insurance coverage 100 each 0    aspirin (ASA) 81 MG chewable tablet Take 1 tablet (81 mg) by mouth daily (Patient taking differently: Take 81 mg by mouth every morning) 120 tablet 0    blood glucose (NO BRAND SPECIFIED) test strip Use to test blood sugar 4 times daily or as directed. 400 strip 3    blood glucose monitoring (ACCU-CHEK FELIPE SMARTVIEW) meter device kit Use to test blood sugar 3-4 times daily, as directed. 1 kit 0    blood glucose monitoring (SOFTCLIX) lancets 1 each 4 times daily Use to test blood sugars 2 times daily. 400 each 8    Calcium Carb-Cholecalciferol (CALCIUM CARBONATE-VITAMIN D3) 600-400 MG-UNIT TABS TAKE ONE TABLET BY MOUTH TWICE A DAY WITH MEALS 180 tablet 3    Dulaglutide (TRULICITY) 4.5 MG/0.5ML SOPN Inject 4.5 mg Subcutaneous once a week 6 mL 1    empagliflozin (JARDIANCE) 10 MG TABS tablet Take 1 tablet (10 mg) by mouth daily 90 tablet 3    ferrous sulfate (FEROSUL) 325 (65 Fe) MG tablet Take 1 tablet (325 mg) by mouth daily (with breakfast) 90 tablet 3    furosemide (LASIX) 20 MG tablet Take 2 tablets (40 mg) by mouth daily 180 tablet 3    HUMALOG KWIKPEN 100 UNIT/ML soln Inject 5-20 Units Subcutaneous 3 times daily (before meals) Max daily dose 60 units. 60 mL 1    HUMALOG KWIKPEN 100 UNIT/ML soln INJECT 8 TO 14 UNITS UNDER THE SKIN THREE TIMES DAILY BEFORE A MEAL PER SLIDING SCALE AS DIRECTED 15 mL 1    insulin NPH (HUMULIN N KWIKPEN) 100 UNIT/ML injection GIve 40 units each morning and 12 at night subcutaneous 45 mL 1    insulin  UNIT/ML injection GIve 40 units each morning and 12-16  units each evening BEFORE meals (Patient taking differently: Inject 40 Units Subcutaneous 2 times daily GIve 40 units each morning and 12 at night) 60 mL 3    insulin pen needle (32G X 4 MM) 32G X 4 MM miscellaneous Use 5-6 pen needles daily or as directed. 450 each 3    ketorolac (ACULAR) 0.5 % ophthalmic solution Place 1 drop into the right eye 2 times daily 10 mL 3     lisinopril (ZESTRIL) 10 MG tablet Take 1 tablet (10 mg) by mouth 2 times daily 60 tablet 11    magnesium oxide (MAG-OX) 400 (241.3 Mg) MG tablet TAKE ONE TABLET BY MOUTH TWICE A  tablet 3    magnesium oxide 400 MG tablet Take 1 tablet (400 mg) by mouth 2 times daily 180 tablet 1    mycophenolate (GENERIC EQUIVALENT) 500 MG tablet Take 3 tablets (1,500 mg) by mouth 2 times daily 180 tablet 11    ofloxacin (OCUFLOX) 0.3 % ophthalmic solution Place 1 drop into the right eye 4 times daily 5 mL 0    omega-3 acid ethyl esters (LOVAZA) 1 g capsule Take 1 capsule (1 g) by mouth daily 90 capsule 3    polyethylene glycol (GOLYTELY) 236 g suspension The night before the exam at 6 pm drink an 8-ounce glass every 15 minutes until the jug is half empty. If you arrive before 11 AM: Drink the other half of the Golytely jug at 11 PM night before procedure. If you arrive after 11 AM: Drink the other half of the Golytely jug at 6 AM day of procedure. For additional instructions refer to your colonoscopy prep instructions. 4000 mL 0    prednisoLONE acetate (PRED FORTE) 1 % ophthalmic suspension Place 1 drop into the right eye 2 times daily 10 mL 3    prednisoLONE acetate (PRED FORTE) 1 % ophthalmic suspension Place 1 drop Into the left eye daily 10 mL 2    prednisoLONE acetate (PRED FORTE) 1 % ophthalmic suspension Place 1-2 drops Into the left eye 2 times daily (Patient taking differently: Place 1 drop Into the left eye every other day) 1 Bottle 1    rosuvastatin (CRESTOR) 20 MG tablet Take 1 tablet (20 mg) by mouth daily 90 tablet 3    senna-docusate (SENOKOT-S/PERICOLACE) 8.6-50 MG tablet Take 1 tablet by mouth 2 times daily as needed (hold for loose stools) 60 tablet 3    sirolimus (GENERIC EQUIVALENT) 0.5 MG tablet Take 6 tablets (3 mg) by mouth daily 180 tablet 11    tamsulosin (FLOMAX) 0.4 MG capsule TAKE ONE CAPSULE BY MOUTH ONCE DAILY 90 capsule 3    order for DME Auto CPAP 8-15 cmH20 1 Units 0       Family  History  family history includes Diabetes in his brother, sister, and sister.    Social History   reports that he has never smoked. He has never used smokeless tobacco. He reports that he does not drink alcohol and does not use drugs.     Past Medical History  Past Medical History:   Diagnosis Date    CAD (coronary artery disease)     CHF (congestive heart failure) (H)     CKD (chronic kidney disease), stage III (H)     Cortical cataract of both eyes     Diabetes (H)     Hyperlipidemia     Hypertension     Infection due to Streptococcus mitis group 09/23/2020    Ischemic cardiomyopathy     Obesity     CHANTEL (obstructive sleep apnea)     occas cpap    CHANTEL (obstructive sleep apnea)- severe (AHI 30)     Osteoarthritis        Past Surgical History:   Procedure Laterality Date    CATARACT IOL, RT/LT      COLONOSCOPY N/A 8/7/2019    Procedure: COLONOSCOPY, WITH POLYPECTOMY AND BIOPSY;  Surgeon: Chauncey Morataya MD;  Location:  GI    COLONOSCOPY N/A 5/12/2023    Procedure: Colonoscopy;  Surgeon: Mariano Velasquez MD;  Location:  GI    CV ANGIOGRAM CORONARY GRAFT N/A 7/2/2020    Procedure: Angiogram Coronary Graft;  Surgeon: Alex Lantigua MD;  Location: [facility] CARDIAC CATH LAB    CV CORONARY ANGIOGRAM N/A 7/2/2020    Procedure: CV CORONARY ANGIOGRAM;  Surgeon: Alex Lantigua MD;  Location: [facility] CARDIAC CATH LAB    CV CORONARY ANGIOGRAM N/A 7/20/2021    Procedure: CV CORONARY ANGIOGRAM;  Surgeon: Raimundo Hudson MD;  Location: [facility] CARDIAC CATH LAB    CV CORONARY ANGIOGRAM N/A 9/12/2022    Procedure: Coronary Angiogram- BIPLANE;  Surgeon: Raimundo Hudson MD;  Location: [facility] CARDIAC CATH LAB    CV CORONARY ANGIOGRAM N/A 7/17/2023    Procedure: Coronary Angiogram;  Surgeon: Alex Lantigua MD;  Location: [facility] CARDIAC CATH LAB    CV HEART BIOPSY N/A 7/27/2020    Procedure: Heart Biopsy;  Surgeon: Mario Burr MD;  Location: [facility]  CARDIAC CATH LAB    CV HEART BIOPSY N/A 8/3/2020    Procedure: CV HEART BIOPSY;  Surgeon: Alex Lantigua MD;  [facility]    CV HEART BIOPSY N/A 8/10/2020    Procedure: CV HEART BIOPSY;  Surgeon: Mario Burr MD;  [facility]    CV HEART BIOPSY N/A 8/17/2020    Procedure: CV HEART BIOPSY;  Surgeon: Raimundo Hudson MD;  [facility]    CV HEART BIOPSY N/A 8/31/2020    Procedure: CV HEART BIOPSY;  Surgeon: Moises Santos MD;  [facility]    CV HEART BIOPSY N/A 9/14/2020    Procedure: CV HEART BIOPSY;  Surgeon: Raimundo Hudson MD;  [facility]    CV HEART BIOPSY N/A 9/28/2020    Procedure: CV HEART BIOPSY;  Surgeon: Raimundo Hudson MD;  [facility]    CV HEART BIOPSY N/A 10/12/2020    Procedure: CV HEART BIOPSY;  Surgeon: John Stuart MD;  [facility]    CV HEART BIOPSY N/A 11/10/2020    Procedure: CV HEART BIOPSY;  Surgeon: John Stuart MD;  [facility]    CV HEART BIOPSY N/A 12/7/2020    Procedure: CV HEART BIOPSY;  Surgeon: Raimundo Hudson MD;  Location:  [facility] CARDIAC CATH LAB    CV HEART BIOPSY N/A 1/5/2021    Procedure: CV HEART BIOPSY;  Surgeon: Raimundo Hudson MD;  [facility]    CV HEART BIOPSY N/A 7/20/2021    Procedure: CV HEART BIOPSY;  Surgeon: Raimundo Hudson MD;  Location:  HEART CARDIAC CATH LAB    CV HEART BIOPSY N/A 9/28/2021    Procedure: CV HEART BIOPSY;  Surgeon: Raimundo Hudson MD;  [facility]    CV HEART BIOPSY N/A 9/12/2022    Procedure: Heart Biopsy;  Surgeon: Raimundo Hudson MD;  Location:  HEART CARDIAC CATH LAB    CV HEART BIOPSY N/A 7/17/2023    Procedure: Heart Biopsy;  Surgeon: Alex Lantigua MD;   Location:  HEART CARDIAC CATH LAB    CV INTRA AORTIC BALLOON N/A 7/14/2020    Procedure: RHC WITH LEAVE IN SWAN/IABP;  Surgeon: Sonny Saleh MD;  Location:  HEART CARDIAC CATH LAB    CV INTRAVASULAR ULTRASOUND N/A 9/12/2022    Procedure: Intravascular Ultrasound;  Surgeon: Raimundo Hudson MD;  Location:  HEART CARDIAC CATH LAB    CV RIGHT HEART CATH MEASUREMENTS RECORDED N/A 3/25/2019    Procedure: CV RIGHT HEART CATH;  Surgeon: Moises Santos MD;  Location:  HEART CARDIAC CATH LAB    CV RIGHT HEART CATH MEASUREMENTS RECORDED N/A 7/10/2019    Procedure: CV RIGHT HEART CATH;  Surgeon: Jak Mccabe MD;  Location:  HEART CARDIAC CATH LAB    CV RIGHT HEART CATH MEASUREMENTS RECORDED N/A 7/8/2020    Procedure: Right Heart Cath with Leave In [place] already has ICU load;  Surgeon: Jak Mccabe MD;  Location:  HEART CARDIAC CATH LAB    CV RIGHT HEART CATH MEASUREMENTS RECORDED N/A 7/14/2020    Procedure: CV RIGHT HEART CATH;  Surgeon: Sonny Saleh MD;  Location:  HEART CARDIAC CATH LAB    CV RIGHT HEART CATH MEASUREMENTS RECORDED N/A 7/2/2020    Procedure: CV RIGHT HEART CATH;  Surgeon: Alex Lantigua MD;  Location:  HEART CARDIAC CATH LAB    CV RIGHT HEART CATH MEASUREMENTS RECORDED N/A 7/27/2020    Procedure: Right Heart Cath;  Surgeon: Mario Burr MD;  Location:  HEART CARDIAC CATH LAB    CV RIGHT HEART CATH MEASUREMENTS RECORDED N/A 8/3/2020    Procedure: Right Heart Cath;  Surgeon: Alex Lantigua MD;  Location:  HEART CARDIAC CATH LAB    CV RIGHT HEART CATH MEASUREMENTS RECORDED N/A 8/10/2020    Procedure: CV RIGHT HEART CATH;  Surgeon: Mario Burr MD;  Location:  HEART CARDIAC CATH LAB    CV RIGHT HEART CATH MEASUREMENTS RECORDED N/A 8/17/2020    Procedure: CV RIGHT HEART CATH;  Surgeon: Raimundo Hudson MD;  Location:  HEART CARDIAC CATH LAB    CV RIGHT HEART CATH MEASUREMENTS RECORDED N/A 8/31/2020     Procedure: CV RIGHT HEART CATH;  Surgeon: Moises Santos MD;  Location:  HEART CARDIAC CATH LAB    CV RIGHT HEART CATH MEASUREMENTS RECORDED N/A 9/14/2020    Procedure: CV RIGHT HEART CATH;  Surgeon: Raimundo Hudson MD;  Location:  HEART CARDIAC CATH LAB    CV RIGHT HEART CATH MEASUREMENTS RECORDED N/A 9/28/2020    Procedure: CV RIGHT HEART CATH;  Surgeon: Raimundo Hudson MD;  Location:  HEART CARDIAC CATH LAB    CV RIGHT HEART CATH MEASUREMENTS RECORDED N/A 10/12/2020    Procedure: CV RIGHT HEART CATH;  Surgeon: John Stuart MD;  Location:  HEART CARDIAC CATH LAB    CV RIGHT HEART CATH MEASUREMENTS RECORDED N/A 11/10/2020    Procedure: CV RIGHT HEART CATH;  Surgeon: John Stuart MD;  Location:  HEART CARDIAC CATH LAB    CV RIGHT HEART CATH MEASUREMENTS RECORDED N/A 12/7/2020    Procedure: CV RIGHT HEART CATH;  Surgeon: Raimundo Hudson MD;  Location:  HEART CARDIAC CATH LAB    CV RIGHT HEART CATH MEASUREMENTS RECORDED N/A 1/5/2021    Procedure: CV RIGHT HEART CATH;  Surgeon: Raimundo Hudson MD;  Location:  HEART CARDIAC CATH LAB    CV RIGHT HEART CATH MEASUREMENTS RECORDED N/A 7/20/2021    Procedure: CV RIGHT HEART CATH;  Surgeon: Raimundo Hudson MD;  Location:  HEART CARDIAC CATH LAB    CV RIGHT HEART CATH MEASUREMENTS RECORDED N/A 9/28/2021    Procedure: CV RIGHT HEART CATH;  Surgeon: Raimundo Hudson MD;  Location:  HEART CARDIAC CATH LAB    CV RIGHT HEART CATH MEASUREMENTS RECORDED N/A 9/12/2022    Procedure: Right Heart Catheterization;  Surgeon: Raimundo Hudson MD;  Location:  HEART CARDIAC CATH LAB    CV RIGHT HEART CATH MEASUREMENTS RECORDED N/A 7/17/2023    Procedure: Right Heart Catheterization;  Surgeon: Alex Lantigua MD;  Location:  HEART CARDIAC CATH LAB    EXTRACTION(S) DENTAL Left 7/13/2020    Procedure: EXTRACTION, TOOTH #11, 12, 13, 15, and 29;  Surgeon:  Monica Chao DDS;  [facility]    IMPLANT AUTOMATIC IMPLANTABLE CARDIOVERTER DEFIBRILLATOR      INCISION AND DRAINAGE STERNUM W/ IRRIGATION SYSTEM, COMBINED N/A 9/23/2020    Procedure: INCISION AND DRAINAGE, LEFT SUPRACLAVICULAR WOUND INFECTION AND ABDOMENN WOUND.;  Surgeon: Ran Huertas MD;  [facility]    INSERT INTRAAORTIC BALLOON PUMP N/A 7/16/2020    Procedure: Subclavian Intra-Aortic Balloon Pump Placement;  Surgeon: Allan Sparrow MD;  [facility]    IRRIGATION AND DEBRIDEMENT CHEST WASHOUT, COMBINED N/A 9/28/2020    Procedure: IRRIGATION AND DEBRIDEMENT OF LEFT UPPER CHEST WOUND.;  Surgeon: Ran Huertas MD;  [facility]    PHACOEMULSIFICATION CLEAR CORNEA WITH STANDARD INTRAOCULAR LENS IMPLANT Right 2/6/2023    Procedure: RIGHT EYE PHACOEMULSIFICATION, CATARACT, WITH INTRAOCULAR LENS IMPLANT with trypan blue;  Surgeon: Triny Bunch MD;  [facility]    PHACOEMULSIFICATION CLEAR CORNEA WITH STANDARD IOL, VITRECTOMY PARSPLANA 25 GAGUE, COMBINED Left 12/10/2019    Procedure: PHACOEMULSIFICATION, CATARACT, CLEAR CORNEAL INCISION APPROACH, W STD INTRAOCULAR LENS IMPLANT INSERT + VITRECTOMY BY PARS PLANA  USING 25-GAUGE INSTRUMENTS. ENDOLASER, INFUSION OF 20% SF6 GAS;  Surgeon: Triny Bunch MD;  [facility]    PICC DOUBLE LUMEN PLACEMENT Left 07/28/2020    5Fr - 42cm, Basilic vein, mid SVC    PICC INSERTION Right 07/11/2020    basilic 44 cm total     TRANSPLANT HEART RECIPIENT N/A 7/19/2020    Procedure: REDO MEDIAN STERNOTOMY, TRANSPLANT, ORTHOTOPIC HEART, RECIPIENT, ON PUMP OXYGENATOR, REMOVAL OF CARDIAC DEFIBRILLATOR AND LEAD;  Surgeon: Griselli, Massimo, MD;  [facility]    VITRECTOMY, PARS PLANA APPROACH, USING 27-GAUGE INSTRUMENTS Left 12/21/2020    Procedure: 27 gauge pars plana vitectomy, membrane peel, perfluoroctane liquid (PFO), retinectomy, endolaser, Silicone Oil 1000 cs;  Surgeon: Triny Bunch MD;  [facility]    ZZC CABG,  "ARTERY-VEIN, THREE  02/2008       Physical Exam  /70 (BP Location: Right arm, Patient Position: Sitting, Cuff Size: Adult Large)   Pulse 97   Ht 1.651 m (5' 5\")   Wt 80.3 kg (177 lb)   SpO2 98%   BMI 29.45 kg/m    Body mass index is 29.45 kg/m .    FEET: no ulcers.    RESULTS  Creatinine   Date Value Ref Range Status   07/28/2023 2.52 (H) 0.67 - 1.17 mg/dL Final   05/11/2021 1.84 (H) 0.66 - 1.25 mg/dL Final     GFR Estimate   Date Value Ref Range Status   07/28/2023 27 (L) >60 mL/min/1.73m2 Final   05/11/2021 37 (L) >60 mL/min/[1.73_m2] Final     Comment:     Non  GFR Calc  Starting 12/18/2018, serum creatinine based estimated GFR (eGFR) will be   calculated using the Chronic Kidney Disease Epidemiology Collaboration   (CKD-EPI) equation.       Hemoglobin A1C   Date Value Ref Range Status   07/17/2023 8.7 (H) <5.7 % Final     Comment:     Normal <5.7%   Prediabetes 5.7-6.4%    Diabetes 6.5% or higher     Note: Adopted from ADA consensus guidelines.   01/14/2021 6.7 (H) 0 - 5.6 % Final     Comment:     Normal <5.7% Prediabetes 5.7-6.4%  Diabetes 6.5% or higher - adopted from ADA   consensus guidelines.       Potassium   Date Value Ref Range Status   07/28/2023 4.8 3.4 - 5.3 mmol/L Final   03/14/2022 4.4 3.4 - 5.3 mmol/L Final   05/11/2021 4.0 3.4 - 5.3 mmol/L Final     ALT   Date Value Ref Range Status   07/17/2023 17 0 - 70 U/L Final     Comment:     Reference intervals for this test were updated on 6/12/2023 to more accurately reflect our healthy population. There may be differences in the flagging of prior results with similar values performed with this method. Interpretation of those prior results can be made in the context of the updated reference intervals.     05/11/2021 18 0 - 70 U/L Final     AST   Date Value Ref Range Status   07/17/2023 24 0 - 45 U/L Final     Comment:     Reference intervals for this test were updated on 6/12/2023 to more accurately reflect our healthy " population. There may be differences in the flagging of prior results with similar values performed with this method. Interpretation of those prior results can be made in the context of the updated reference intervals.   05/11/2021 <3 0 - 45 U/L Final     TSH   Date Value Ref Range Status   08/05/2020 0.80 0.40 - 4.00 mU/L Final       Cholesterol   Date Value Ref Range Status   07/17/2023 92 <200 mg/dL Final   02/02/2023 119 <200 mg/dL Final   01/05/2021 133 <200 mg/dL Final   08/17/2020 118 <200 mg/dL Final     HDL Cholesterol   Date Value Ref Range Status   01/05/2021 40 >39 mg/dL Final   08/17/2020 75 >39 mg/dL Final     Direct Measure HDL   Date Value Ref Range Status   07/17/2023 30 (L) >=40 mg/dL Final   02/02/2023 32 (L) >=40 mg/dL Final     LDL Cholesterol Calculated   Date Value Ref Range Status   07/17/2023 26 <=100 mg/dL Final   02/02/2023 14 <=100 mg/dL Final   01/05/2021 58 <100 mg/dL Final     Comment:     Desirable:       <100 mg/dl   08/17/2020 26 <100 mg/dL Final     Comment:     Desirable:       <100 mg/dl     Triglycerides   Date Value Ref Range Status   07/17/2023 178 (H) <150 mg/dL Final   02/02/2023 365 (H) <150 mg/dL Final   01/05/2021 179 (H) <150 mg/dL Final     Comment:     Borderline high:  150-199 mg/dl  High:             200-499 mg/dl  Very high:       >499 mg/dl  Fasting specimen     08/17/2020 82 <150 mg/dL Final     Cholesterol/HDL Ratio   Date Value Ref Range Status   06/27/2015 2.6 0.0 - 5.0 Final   01/11/2015 6.0 (H) 0.0 - 5.0 Final         ASSESSMENT/PLAN:      TYPE 2 DIABETES MELLITUS: Uncontrolled type 2 diabetes mellitus. Pt's Cardiology staff started Jardiance 10 mg each am which pt has not started yet. Will see if SGLT-2 drug helps improve his postprandial blood sugars. Continue current insulin doses for now.  Also continue Trulicity 4.5 mg subcutaneous once a week.   RETINOPATHY: Severe proliferative diabetic retinopathy and has frequent follow up with Oph  here.  NEPHROPATHY:Creat 1.95 with GFR 37 mL/min on 7/17/2023. Pt taking Lisinopril.  NEUROPATHY: Unchanged per patient. No foot ulcers.  HTN: /70 today.  LIPIDS: LDL 26 in July 2023. Pt taking Crestor.  OBESITY: Encouraged pt to make healthy food choices and continue to walk daily. Continue Trulicity.  FOLLOW UP: Appt with me or Marisabel Prieto in 2 months.    Time spent reviewing chart,labs and glucose data today = 8 minutes.  Time for clinic visit today = 25 minutes.  Time for documentation today = 15 minutes.    Total time for visit today = 48 minutes.    Jacque Smith PA-C

## 2023-08-01 ENCOUNTER — TELEPHONE (OUTPATIENT)
Dept: TRANSPLANT | Facility: CLINIC | Age: 68
End: 2023-08-01
Payer: COMMERCIAL

## 2023-08-01 DIAGNOSIS — Z94.1 HEART REPLACED BY TRANSPLANT (H): Primary | ICD-10-CM

## 2023-08-02 ENCOUNTER — APPOINTMENT (OUTPATIENT)
Dept: INTERPRETER SERVICES | Facility: CLINIC | Age: 68
End: 2023-08-02
Payer: COMMERCIAL

## 2023-08-02 RX ORDER — FUROSEMIDE 20 MG
20 TABLET ORAL DAILY
Qty: 30 TABLET | Refills: 1 | Status: ON HOLD | OUTPATIENT
Start: 2023-08-02 | End: 2023-10-15

## 2023-08-02 NOTE — TELEPHONE ENCOUNTER
Results reviewed with patient with [place] interpretor.   -Cr uptrending to 2.52 (up from 1.95)  Current lasix- 40 daily-->decrease to 20 mg daily per Dr. Hogan     Patient denies fluid retention and sob. He reports that in the last 2-weeks he has lost ~6 lbs. Today his weight is 174 lbs    -Sirolimus level 6.2 (goal 6-8), confirmed 24-hour trough and current dose 3 mg daily. No dose change.     Plant to check in with patient next week and recheck labs in 2-weeks. Call/labs sooner if more fluctuations in weight in the next week. Labs scheduled for 8/14 at 9a. Patient verbalized understanding and next steps.

## 2023-08-09 ENCOUNTER — TELEPHONE (OUTPATIENT)
Dept: CARDIOLOGY | Facility: CLINIC | Age: 68
End: 2023-08-09
Payer: COMMERCIAL

## 2023-08-09 DIAGNOSIS — Z94.1 HEART TRANSPLANT, ORTHOTOPIC, STATUS (H): ICD-10-CM

## 2023-08-09 RX ORDER — SULFAMETHOXAZOLE AND TRIMETHOPRIM 400; 80 MG/1; MG/1
1 TABLET ORAL
Qty: 39 TABLET | Refills: 3 | Status: ON HOLD | OUTPATIENT
Start: 2023-08-10 | End: 2023-10-15

## 2023-08-09 NOTE — TELEPHONE ENCOUNTER
Pt is requesting new rx for    Sulfamethoxazole- trimet 400-80 tabs    Did not see on active med list please verify and send new rx    [facility]  [phone]

## 2023-08-14 ENCOUNTER — LAB (OUTPATIENT)
Dept: LAB | Facility: CLINIC | Age: 68
End: 2023-08-14
Payer: COMMERCIAL

## 2023-08-14 DIAGNOSIS — Z94.1 HEART REPLACED BY TRANSPLANT (H): ICD-10-CM

## 2023-08-14 LAB
ANION GAP SERPL CALCULATED.3IONS-SCNC: 8 MMOL/L (ref 7–15)
BUN SERPL-MCNC: 34.2 MG/DL (ref 8–23)
CALCIUM SERPL-MCNC: 8.9 MG/DL (ref 8.8–10.2)
CHLORIDE SERPL-SCNC: 103 MMOL/L (ref 98–107)
CREAT SERPL-MCNC: 2.4 MG/DL (ref 0.67–1.17)
DEPRECATED HCO3 PLAS-SCNC: 23 MMOL/L (ref 22–29)
GFR SERPL CREATININE-BSD FRML MDRD: 28 ML/MIN/1.73M2
GLUCOSE SERPL-MCNC: 131 MG/DL (ref 70–99)
POTASSIUM SERPL-SCNC: 5.1 MMOL/L (ref 3.4–5.3)
SODIUM SERPL-SCNC: 134 MMOL/L (ref 136–145)

## 2023-08-14 PROCEDURE — 80048 BASIC METABOLIC PNL TOTAL CA: CPT | Performed by: PATHOLOGY

## 2023-08-14 PROCEDURE — 36415 COLL VENOUS BLD VENIPUNCTURE: CPT | Performed by: PATHOLOGY

## 2023-08-15 ENCOUNTER — TELEPHONE (OUTPATIENT)
Dept: TRANSPLANT | Facility: CLINIC | Age: 68
End: 2023-08-15
Payer: COMMERCIAL

## 2023-08-15 NOTE — TELEPHONE ENCOUNTER
Pt called using  to review labs, and testing results.  Dr. Sheridan reviewed SIENNA results- no further testing needed at this time.  Pt to repeat testing next year, and call with any changes in the meantime.  BMP stable for pt.  Wt and fluid status is stable on 20mg of Lasix daily.  Cont dosing for now.  Pt will call with any changes in wt or edema.

## 2023-08-24 DIAGNOSIS — Z79.4 TYPE 2 DIABETES MELLITUS WITH DIABETIC NEPHROPATHY, WITH LONG-TERM CURRENT USE OF INSULIN (H): ICD-10-CM

## 2023-08-24 DIAGNOSIS — E11.21 TYPE 2 DIABETES MELLITUS WITH DIABETIC NEPHROPATHY, WITH LONG-TERM CURRENT USE OF INSULIN (H): ICD-10-CM

## 2023-08-24 NOTE — TELEPHONE ENCOUNTER
7/25/2023  [facility] [place]     Multiple Providers  Endocrinology, Diabetes, and Metabolism

## 2023-09-08 ENCOUNTER — TELEPHONE (OUTPATIENT)
Dept: ENDOCRINOLOGY | Facility: CLINIC | Age: 68
End: 2023-09-08
Payer: COMMERCIAL

## 2023-09-08 NOTE — TELEPHONE ENCOUNTER
Pt was scheduled incorrectly on 9/12 as in person on [facility] virtual day; pt opted to cancel appt and wait for nov in person appt [place] on 9/8/2023 at 9:15 AM

## 2023-09-30 ENCOUNTER — TELEPHONE (OUTPATIENT)
Dept: GASTROENTEROLOGY | Facility: CLINIC | Age: 68
End: 2023-09-30
Payer: COMMERCIAL

## 2023-09-30 DIAGNOSIS — Z12.11 ENCOUNTER FOR SCREENING COLONOSCOPY: Primary | ICD-10-CM

## 2023-09-30 RX ORDER — BISACODYL 5 MG/1
TABLET, DELAYED RELEASE ORAL
Qty: 4 TABLET | Refills: 0 | Status: SHIPPED | OUTPATIENT
Start: 2023-09-30 | End: 2023-10-12

## 2023-09-30 NOTE — LETTER
September 30, 2023      Lucian Huynh Tee  [address] [facility] [address]      Colonoscopy      Procedure date: 10/13/2023    Anticipated arrival time: 6:30 AM   (Procedure Times are Subject to Change)    Facility location: [facility]; [address] [place] [address], [place] [address] - Check in location: Main entrance at registration desk. Parking information: Self pay parking available in the Patient & Visitor Ramp on the corner of  [facility] and [facility].  options are available 24 hours for patients with mobility limitations. Self-park and shuttle service from the parking ramp available. The phone number for shuttle requests is [phone].      Important Procedure Reminders:     Prep Instructions:   Instructions on how to prepare for your upcoming procedure are found below. Please read instructions carefully. Deviation from instructions may result in less than desired outcomes and procedure may need to be rescheduled. If you have additional questions regarding how to prepare for your upcoming procedure, please contact our endoscopy pre assessment nurses at [phone] option 4.      Policy:   On the day of your procedure, please designatean adult(s) who can drive you home stay with you for the next 24 hours. The medicines used in the exam will make you sleepy. You will not be able to drive. You cannot take public transportation, ride share services, or non-medical taxi service without a responsible caregiver.  Medical transport services are allowed with the requirement that a responsible caregiver will receive you at your destination.  We require that drivers and caregivers are confirmed prior to your procedure.    Day of procedure:  Please do not wear jewelry (i.e. earrings, rings, necklaces, watches, etc) . Leave your purse, billfold, credit cards, and other valuables at home.   Bring insurance card and ID.   To cancel or reschedule your procedure:    Please call our endoscopy scheduling team at: [facility]: [phone], option 2. Monday through Friday, 7:00am-5:00pm.      Medication Reminders:    Please note the following medication holding recommendations:   Oral diabetic medication(s): Jardiance (empagliflozin): HOLD  3 days before procedure.  Injectable diabetic medication(s): Trulicity (Dulaglutide).  Weekly dosing of medication.  Hold 7 days before procedure.  Follow up with managing provider.   Insulin.  Consult with your managing provider.   Ferrous Sulfate (iron supplement): HOLD 7 days before procedure.    ----------------------------------------------------------------------------------------------------------------------------------------------------------------------------------------------------------------------------------------------------------------------      Bowel prep has been sent to    [id number] - [facility] [address] AT [address]      Preparación extendida para la colonoscopia con Golytely    Shahana estas instrucciones detenidamente al menos 7 días antes del procedimiento de colonoscopia.     Asegúrese de seguir todas las indicaciones por completo. El interior del colon debe estar limpio para permitir un examen completo a fin de detectar la presencia de crecimientos, pólipos o anomalías, así halie la biopsia o la extracción de estos. Se incluyen varios consejos para que esta parte del procedimiento sea lo más cómoda posible.     A partir de ahora:  Un integrante del personal de enfermería le llamará shen semana antes del examen para recetarle lo que debe fabiola para preparar el intestino, revisar las instrucciones de preparación y responder cualquier otra pregunta que tenga.   Usted debe ponerse de acuerdo con un adulto para que lo lleve a lainez casa después del examen. La colonoscopia no se puede realizar a menos que cuente con traslado. Si necesita usar el transporte público,  alguien debe acompañarlo y quedarse con usted marissa un mínimo de 6 a 24 horas.  Consulte con lainez aseguradora para cerciorarse de que ellos vayan a pagar el examen. Póngase de acuerdo con un adulto responsable para que lo/la lleve a casa el día del examen. Quien lo/la lleve a casa no puede ser solamente un servicio de taxi ni un autobús; necesitará a alguien que se quede con usted después del procedimiento.     7 días antes:  Hable con el médico que realizó la indicación: Si veronica anticoagulantes (halie Coumadin, Plavix, Xarelto, Eliquis o Lovenox, entre otros), puede ser que deba interrumpir lainez ingesta de manera temporal, antes del procedimiento. El médico que realizó la indicación le dirá lo que debe hacer.   Si tiene diabetes, deberá solicitar que la lizandro sea temprano por la mañana.  Deje de fabiola suplementos de fibra, multivitamínicos con petros y todos los medicamentos que contengan petros.  Surta lainez receta de 2 envases de Golytely y 4 comprimidos de Dulcolax en la farmacia.  Es muy importante que se mantenga debidamente hidratado marissa la preparación para la colonoscopia. La solución Golytely para preparar el intestino está diseñada para limpiar el colon, kiley no lo mantendrá hidratado. Mientras dure la preparación que se le haya prescrito, también deberá fabiola 64 oz de Gatorade o de alguna bebida deportiva similar para mantenerse hidratado (evite los colores kate y púrpura).   Deje de comer maíz, palomitas de maíz, fe secos y alimentos con semillas.   Adopte shen dieta baja en fibras (consulte los ejemplos a continuación).     2 días antes:  Deje de fabiola analgésicos antiinflamatorios no esteroideos (NSAID, por kely siglas en inglés) halie Advil, Aleve, ibuprofeno, naproxeno o Motrin.  Keshia líquidos para mantenerse hidratado. [place] es importante. Keshia por lo menos 4 vasos grandes de agua, Gatorade o de alguna bebida deportiva similar.  Es recomendable aplicarse vaselina en la piel que rodea el ano después de  cada evacuación para prevenir irritación. Las toallitas húmedas también ayudan a disminuir la irritación.   Puede comer un desayuno liviano, bajo en fibras. También puede comer un almuerzo liviano, bajo en fibras.  No consuma alimentos sólidos después de la 1 p. m. Pase a shen dieta de líquidos jocelyne. (consulte la lista a continuación).  A las 4 p. m., tome 2 comprimidos de Dulcolax (bisacodilo).  A las 5 p. m., disuelva en agua el Golytely y keshia la mitad del envase. Keshia un vaso de 8 oz de Golytely cada 15 minutos hasta que se termine la mitad del envase. Guarde en la nevera el desiree de Golytely. Permanezca cerca del baño.      1 día antes:  Continúe con la dieta de líquidos jocelyne solamente (consulte los ejemplos a continuación). No consuma alimentos sólidos brian día.  No consuma bebidas de color kate ni púrpura.  A las 4 p. m., tome 2 comprimidos de Dulcolax (bisacodilo).  A las 5 p. m., keshia la segunda mitad del envase de Golytely. Keshia un vaso de 8 oz de Golytely cada 10 o 15 minutos hasta que se termine el primer envase de Golytely.   La solución Golytely para preparar el intestino no lo mantendrá hidratado. Tiene que beber de 8 a 10 vasos de líquidos jocelyne marissa el día.  Antes de irse a dormir, mezcle el judy envase de Golytely y guárdelo en la nevera para la mañana siguiente.     El día del procedimiento:  6 horas antes de lainez hora de registro, keshia un vaso de 8 oz de Golytely cada 15 minutos hasta que se termine la mitad del judy envase.  NO DEBE beber el desiree del judy envase de Golytely.   Puede fabiola kely medicamentos necesarios por la mañana con sorbos de agua.  No fume, no mastique tabaco ni trague nada, ni siquiera agua ni chicle por lo menos 2 horas antes de lainez hora de llegada. [place] es un asunto de seguridad. El procedimiento podría cancelarse si no sigue las indicaciones.  Llegue con un adulto responsable que pueda llevarlo a casa después del examen y que pueda quedarse con usted marissa  un mínimo de 24 horas: Los medicamentos que se usan lo harán sentirse somnoliento y olvidadizo. Si no cuenta con alguien que lo lleve a lainez casa, cancelaremos el procedimiento. Si usa el transporte público, alguien debe acompañarlo.  Realice las nebulizaciones y la terapia para despejar las vías respiratorias por la mañana, antes del procedimiento (si es necesario).  Si tiene asma, traiga lainez inhalador.        LÍQUIDOS JOCELYNE   Puede consumir lo siguiente:  Agua, té, café (sin leche ni crema).  Gaseosa, Gatorade (ni rojos ni púrpura).  Gelatina, palitos de helado (sin leche ni trozos de fruta; ni rojos ni púrpura).  Caldo o consomé sin grasa.  Caramelos duros simples, halie caramelos jocelyne con forma de kendra (ni rojos ni púrpura).  Bebidas con sabor a fruta y jugos jocelyne, halie jugo de manzana, jugo de uva bienvenido, Hi-C y Nicho-Aid (ni casarez ni púrpura).    No consuma lo siguiente:  Leche o productos lácteos, halie helado, malteadas o batidos, ni crema para café.  Bebidas casarez o púrpura de ningún tipo, halie jugo de arándano o jugo de uva. Evite gelatinas, palitos de helado, Nicho-Aid, sorbetes y dulces rojos o púrpura.  Jugos con pulpa, halie jugo de naranja, pomelo, noriega o tomate.  Sopa crema de ningún tipo.  Alcohol y cerveza.  Bebidas de proteínas o polvo de proteínas.            DIETA BAJA EN FIBRAS   Puede consumir lo siguiente:  Almidón: pan knox, panecillos, galletas, cruasanes, tostadas Viviana, tortillas de harina bienvenido, wafles, panqueques, tostadas francesas, arroz knox, fideos, pasta, macarrones, sandra cocidas y peladas, galletas de agua comunes, galletas saladas, sémola cocida o crema de arroz, arroz inflado, hojuelas de maíz, Rice Krispies, Special K.   Vegetales: cocidos tiernos y enlatados; caldos de vegetales.  Frutas y jugos de fruta: jugo de fruta colado, fruta enlatada sin semillas ni cáscara (no de noriega), puré de manzana, puré de charles, bananas maduras, melón (no sandía).   Productos lácteos: leche  (común o saborizada), queso, queso cottage, yogur (que no sea de bayas), natillas, helado.   Proteínas: carne molida de res, montejo, ternera, jamón, cerdo, angela, pavo, pescado o vísceras tiernos y savannah cocidos; huevos, mantequilla de maní cremosa.   Grasas y condimentos: margarina, mantequilla, aceites, mayonesa, crema agria, aderezo para ensaladas, fondo simple de cocción de carne; especias, hierbas cocidas, azúcar, jalea adrien, miel, jarabe.   Bocadillos, dulces y bebidas: pretzels, caramelos duros, pasteles y galletas simples (sin fe secos o semillas), gelatina, pudines simples, sorbetes, palitos de helado, café, té, bebidas carbonatadas (con gas). No consuma lo siguiente:  Almidón: panes o panecillos que contengan fe secos, semillas o fruta; pan integral de nandini o de otros granos que contenga más de 1 gramo de fibra por rebanada, pan de maíz, tortillas de maíz o de nandini integral, sandra con cáscara, arroz integral, arroz lizzie, kasha (nandini sarraceno) y jw.   Vegetales: cualquier vegetal crudo o al vapor, vegetales con semillas, maíz de cualquier forma.   Frutas y jugos de fruta: ciruelas pasas, jugo de ciruela pasa, uvas pasas y otras frutas deshidratadas, bayas y otras frutas con semillas; jugos con pulpa enlatados, halie jugo de naranja, pomelo, noriega o tomate.  Productos lácteos: cualquier yogur con fe secos, semillas o bayas.   Proteínas: brandi duras, fibrosas con cartílago; frijoles, arvejas o lentejas secos cocidos, mantequilla de maní crujiente.  Grasas y condimentos: pepinillos, aceitunas, relish, rábano picante, mermelada, jalea, conservas.   Bocadillos, dulces y bebidas: palomitas de maíz, fe secos, semillas, granola, diana, caramelos hechos con fe secos o semillas; todos los postres que contengan fe secos, semillas, uvas pasas y otras frutas deshidratadas, diana, granos integrales o salvado.      Preguntas frecuentes:   Cómo sabe si el colon está limpio?   Después de  completar la preparación del intestino, kely deposiciones deben ser líquidas y dee dee. Se verán similares a la orina en el inodoro. Si hay trozos de heces (triston) en el inodoro o si no puede caesar el fondo del inodoro, llame a nuestra oficina para obtener asesoramiento. Llame al [phone] y pida hablar con un miembro del personal de enfermería.    Por qué necesita un conductor responsable que lo lleve a lainez casa y se quede con usted?  Solicitamos que un adulto responsable lo lleve a lainez casa por lainez seguridad. Los medicamentos de sedación que se usan para relajarlo marissa el procedimiento pueden alterar lainez juicio y tiempo de reacción, hacer que se sienta olvidadizo y posiblemente un poco inestable. No conduzca, tome decisiones importantes ni firme documentos legales marissa 24 horas después del procedimiento.   Es normal sentirse hinchado y con gases después del procedimiento. Caminar ayudará a  el aire a través del colon. Puede fabiola analgésicos sin aspirina que contengan acetaminofén (Tylenol).    Cuándo puede comer después del procedimiento?  Puede retomar lainez alimentación normal cuando se sienta listo, a menos que el médico que realiza el procedimiento le aconseje otra cosa. No madeleine alcohol marissa 24 horas después del procedimiento.   Puede reanudar kely actividades normales (trabajo, ejercicios, etc.) después de 24 horas.    Cuándo recibirá los resultados de la prueba?  Debería recibir los resultados del procedimiento y los resultados de laboratorio (si corresponde) por carta, mensaje de MyChart o llamada telefónica dentro de un plazo de 2 semanas. Si tiene alguna pregunta, llame al médico que lo derivó para realizarse el procedimiento.   Gallo por elegir [facility] para el procedimiento. Si recibe shen encuesta sobre lainez atención, tómese el tiempo para completar el cuestionario.  Valoramos kely comentarios!  [facility] shen colaboración entre los médicos de la  [facility] y el centro médico de la [facility].                                                  Golytely Extended Colonoscopy Prep  Prep instructions for colonoscopy   Pre-Assessment Phone Number: [facility]; [phone] option 4      Please read these instructions carefully at least 7 days prior to your colonoscopy procedure. Be sure to follow all directions completely. The inside of your colon must be clean to allow for a complete examination for the presence of any growths, polyps, and/or abnormalities, as well as their biopsy or removal. A number of tips are included in order to make this part of the procedure as comfortable as possible.    Immediately:  You will receive a call from a Nurse to review instructions and health history.  This assessment must be completed prior to your procedure.  Failure to complete the Nurse assessment may result in the procedure being cancelled.  You must arrange for an adult to drive you home after your exam. Your colonoscopy cannot be done unless you have a ride. If you need to use public transportation, someone must ride with you and stay with you for a minimum of 6-24 hours.  Check with your insurance company to be sure they will cover this exam.Arrange for a responsible adult to drive you home on the day of the exam. This cannot be a taxi or a bus as you will need someone with you after the procedure.    7 days prior:  Talk to your prescribing provider: If you take blood thinners (such as Coumadin, Plavix, Xarelto, Eliquis, Lovenox, or others), these medications may need to be stopped temporarily before your procedure. Your prescribing provider will tell you what to do.   Talk to your prescribing provider: If you take prescription NSAIDS (such as Sulindac, Celebrex, Mobic, Relafen). Your prescribing provider will tell you what to do.   If you have diabetes, you should request an early morning appointment.  Stop taking fiber  supplements and multivitamins containing iron, or any other medications containing iron.  Fill your prescription for 2 containers of Golytely and 4 Dulcolax tablets at the pharmacy.  It is very important that you stay well hydrated during the colonoscopy prep. The Golytely bowel prep is designed to clean out your colon, but it will not provide hydration. While you are taking the prescribed prep, you should also drink 64 oz. of Gatorade or similar sports drink product to drink for staying hydrated. (Avoid red and purple colors)  Stop eating corn, popcorn, nuts and foods with seeds.   Begin a restricted or low fiber diet (see list below).    2 days prior:  Drink fluids to be well hydrated, this is important. Drink at least 4 large glasses of water, Gatorade, or other similar sports drinks.  It is a good idea to use Vaseline on the skin around your anus after each bowel movement to prevent irritation. Wet wipes also help to reduce irritation.   You can have a light, low-fiber breakfast. You may also have a light, low-fiber lunch.  No solid foods after 1pm. Begin a clear liquid diet. (see list below).  At 4pm, take 2 Dulcolax (bisacodyl) tablets.  At 5pm, mix and drink half of a jug of Golytely bowel prep. Drink an 8 oz. Glass of Golytely every 10-15 minutes until half of the jug is gone. Place the remainder of the Golytely in the refrigerator. Stay close to the bathroom.     1 day prior:  Continue clear liquid diet only (see examples below). Do not eat solid food this day.  Do not drink any red or purple colored drinks.  Stop taking NSAID pain relievers, such as Advil, Ibuprofen, Motrin, etc.  You may take Tylenol.  At 4 pm, take 2 Dulcolax (bisacodyl) tablets.  At 5 pm, drink the 2nd half of a jug of Golytely bowel prep. Drink an 8 oz. glass of Golytely every 10-15 minutes until the 1st jug of Golytely is gone.   The Golytely bowel prep will not keep you hydrated. You should drink 8-10 glasses of clear liquids  throughout the day.  Before you go to bed, mix the 2nd container of Golytely and place in the refrigerator for the morning.    Procedure day:  6 hours before your check-in time, drink an 8 oz. Glass of Golytely every 10-15 minutes until half of the 2nd jug of Golytely is gone. You WILL NOT drink the entire 2nd jug of Golytely.   You may take your necessary morning medications with sips of water  Do not take diabetes medicine by mouth until after your exam.  You may drink clear liquids only up until 2 hours before your arrival time.  Do not smoke, chew tobacco, or swallow anything, including water or gum for at least 2 hours before your arrival time. This is a safety issue. Your procedure could be cancelled if you do not follow directions.  Please do not wear jewelry (i.e. earrings, rings, necklaces, watches, etc) . Leave your purse, billfold, credit cards, and other valuables at home.   Please arrive with a responsible adult who can take you home after the test and stay with you for a minimum of 24 hours: The medicine used will make you sleepy and forgetful. If you do not have someone to take you home, we will cancel your procedure. If using public transportation you must have someone to ride with you.  Please perform your nebulizer treatments and airway clearance therapy in the morning prior to the procedure (if applicable).  If you have asthma, bring your inhalers.        CLEAR LIQUID DIET   You may have:  Water, tea, coffee (no milk or cream)  Soda pop, Gatorade (not red or purple)  Jell-O, Popsicles (no milk or fruit pieces - not red or purple)  Fat-free soup broth or bouillon  Plain hard candy, such as clear life savers (not red or purple)  Clear juices and fruit-flavored drinks, such as apple juice, white grape juice, Hi-C, and Nicho-Aid (not red or purple)   Do not have:  Milk or milk products such as ice cream, malts or shakes, or coffee creamer  Red or purple drinks of any kind such as cranberry juice or  grape juice. Avoid red or purple Jell-O, Popsicles, Nicho-Aid, sorbet, sherbet and candy  Juices with pulp such as orange, grapefruit, pineapple or tomato juice  Cream soups of any kind  Alcohol and beer  Protein drinks or protein powder               LOW FIBER DIET   You may have:    Starches: White bread, rolls, biscuits, croissants, Viviana toast, white flour tortillas, waffles, pancakes, Occitan toast; white rice, noodles, pasta, macaroni; cooked and peeled potatoes; plain crackers, saltines; cooked farina or cream of rice; puffed rice, corn flakes, Rice Krispies, Special K   Vegetables: tender cooked and canned, vegetable broths  Fruits and fruit juices: Strained fruit juice, canned fruit without seeds or skin (not pineapple), applesauce, pear sauce, ripe bananas, melons (not watermelon)   Milk products: Milk (plain or flavored), cheese, cottage cheese, yogurt (no berries), custard, ice cream    Proteins: Tender, well-cooked ground beef, lamb, veal, ham, pork, chicken, turkey, fish or organ meats; eggs; creamy peanut butter   Fats and condiments:  Margarine, butter, oils, mayonnaise, sour cream, salad dressing, plain gravy; spices, cooked herbs; sugar, clear jelly, honey, syrup   Snacks, sweets and drinks: Pretzels, hard candy; plain cakes and cookies (no nuts or seeds); gelatin, plain pudding, sherbet, Popsicles; coffee, tea, carbonated ( fizzy ) drinks Do not have:    Starches: Breads or rolls that contain nuts, seeds or fruit; whole wheat or whole grain breads that contain more than 1 gram of fiber per slice; cornbread; corn or whole wheat tortillas; potatoes with skin; brown rice, wild rice, kasha (buckwheat), and oatmeal   Vegetables: Any raw or steamed vegetables; vegetables with seeds; corn in any form   Fruits and fruit juices: Prunes, prune juice, raisins and other dried fruits, berries and other fruits with seeds, canned pineapple juices with pulp such as orange, grapefruit, pineapple or tomato  juice  Milk products: Any yogurt with nuts, seeds or berries   Proteins: Tough, fibrous meats with gristle; cooked dried beans, peas or lentils; crunchy peanut butter  Fats and condiments: Pickles, olives, relish, horseradish; jam, marmalade, preserves   Snacks, sweets and drinks: Popcorn, nuts, seeds, granola, coconut, candies made with nuts or seeds; all desserts that contain nuts, seeds, raisins and other dried fruits, coconut, whole grains or bran.        FAQ:    How do you know if your colon is cleaned out?   After completing the bowel prep, your bowel movements should be all liquid and yellow. Your bowel movements will look similar to urine in the toilet. If there are pieces of stool (poop) in the toilet, or if you can't see to the bottom of the toilet, please call our office for advice. Call [phone] and ask to speak with a nurse.   Why do you need a responsible  to take you home and stay with you?  We require a responsible adult to take you home for your safety. The sedation medicines used to relax you during the procedure can impair your judgement and reaction time, make you forgetful and possible a little unsteady. Do not drive, make any important decisions, or sign any legal documents for 24 hours after your procedure.   It is normal to feel bloated and gassy after your procedure. Walking will help move the air through your colon. You can take non-aspirin pain relievers that contain acetaminophen (Tylenol).   When can you eat after your procedure?  You may resume your normal diet when you feel ready, unless advised otherwise by the doctor performing your procedure. Do not drink alcohol for 24 hours after your procedure.   You many resume normal activities (work, exercise, etc.) after 24 hours.   When will you get test results?  You should have your procedure results and any lab results (if applicable) by letter, MyChart message, or phone call within 2 weeks. If you have any questions, please  call the doctor that referred you for the procedure.       Thank you for choosing [facility], for your procedure. If you are sent a survey regarding your care, please take the time to complete the questionnaire. We value your feedback!

## 2023-09-30 NOTE — TELEPHONE ENCOUNTER
Needs .    Pre visit planning completed.      Procedure details:    Patient scheduled for Colonoscopy  on 10/13/23.     Arrival time: 0630. Procedure time 0730    Pre op exam needed? N/A    Facility location: [facility]; [address], [place] [address]    Sedation type: Conscious sedation     Indication for procedure: Screening       Chart review:     Electronic implanted devices? No    Diabetic? Yes. See medication holding recommendations     Diabetic medication HOLDING recommendations: (if applicable)  Oral diabetic medications: Yes:  Jardiance (empagliflozin): HOLD  3 days before procedure.  Diabetic injectables: Yes- Trulicity (Dulaglutide).  Weekly dosing of medication.  Hold 7 days before procedure.  Follow up with managing provider.   Insulin: Yes. Consult with managing provider       Medication review:    Anticoagulants? No    NSAIDS? Yes.  ASA 81mg - no indication to hold    Other medication HOLDING recommendations:  Ferrous Sulfate (iron supplement): HOLD 7 days before procedure.      Prep for procedure:     Bowel prep recommendation: Extended prep Golytely    Due to:  CKD noted. , diabetes. , poor prep/inadequate bowel prep in the past. , and provider request/ordered.    Prep instructions sent via letter (no patient MyChart activity since 2015)    Bowel prep script sent to    [id number] - [place], [place] - [address] [place] [facility] [place] [address] [facility]      Maribell Arellano RN  Endoscopy Procedure Pre Assessment RN  [phone] option 4

## 2023-10-02 NOTE — TELEPHONE ENCOUNTER
Pre assessment completed for upcoming procedure.   (Please see previous telephone encounter notes for complete details)     ID [id number]      Procedure details:    Arrival time and facility location reviewed.    Pre op exam needed? N/A    Designated  policy reviewed. Instructed to have someone stay 6 hours post procedure.     COVID policy reviewed.      Medication review:    Oral diabetic medication(s): Jardiance (empagliflozin): HOLD  3 days before procedure.  Injectable diabetic medication(s): Trulicity (Dulaglutide).  Weekly dosing of medication.  Hold 7 days before procedure.  Follow up with managing provider.   Insulin.  Consult with your managing provider.       Prep for procedure:     Procedure prep instructions reviewed.        Additional information needed?  N/A      Patient  verbalized understanding and had no questions or concerns at this time.      Briana Cabezas RN  Endoscopy Procedure Pre Assessment RN  [phone] option 4

## 2023-10-05 DIAGNOSIS — Z94.1 HEART TRANSPLANT, ORTHOTOPIC, STATUS (H): ICD-10-CM

## 2023-10-05 RX ORDER — LISINOPRIL 10 MG/1
10 TABLET ORAL 2 TIMES DAILY
Qty: 60 TABLET | Refills: 11 | Status: ON HOLD | OUTPATIENT
Start: 2023-10-05 | End: 2023-10-15

## 2023-10-07 DIAGNOSIS — E11.3513 TYPE 2 DIABETES MELLITUS WITH PROLIFERATIVE RETINOPATHY OF BOTH EYES AND MACULAR EDEMA, UNSPECIFIED WHETHER LONG TERM INSULIN USE (H): ICD-10-CM

## 2023-10-08 NOTE — TELEPHONE ENCOUNTER
insulin NPH (HUMULIN N KWIKPEN) 100 UNIT/ML injection         Last Written Prescription Date:  6/26/23  Last Fill Quantity: 45 ml,   # refills: 1  Last Office Visit : 7/25/23  Future Office visit:  11/7/23    Routing refill request to provider for review/approval because:  Insulin - refilled per clinic  Second request per pharmacy

## 2023-10-09 ENCOUNTER — PATIENT OUTREACH (OUTPATIENT)
Dept: NEPHROLOGY | Facility: CLINIC | Age: 68
End: 2023-10-09

## 2023-10-09 ENCOUNTER — OFFICE VISIT (OUTPATIENT)
Dept: NEPHROLOGY | Facility: CLINIC | Age: 68
End: 2023-10-09
Payer: COMMERCIAL

## 2023-10-09 VITALS
WEIGHT: 165 LBS | SYSTOLIC BLOOD PRESSURE: 108 MMHG | HEART RATE: 96 BPM | DIASTOLIC BLOOD PRESSURE: 56 MMHG | BODY MASS INDEX: 27.46 KG/M2

## 2023-10-09 DIAGNOSIS — D63.1 ANEMIA IN STAGE 4 CHRONIC KIDNEY DISEASE (H): ICD-10-CM

## 2023-10-09 DIAGNOSIS — N18.4 ANEMIA IN STAGE 4 CHRONIC KIDNEY DISEASE (H): ICD-10-CM

## 2023-10-09 DIAGNOSIS — Z94.1 HEART REPLACED BY TRANSPLANT (H): Primary | ICD-10-CM

## 2023-10-09 DIAGNOSIS — N18.4 CKD (CHRONIC KIDNEY DISEASE) STAGE 4, GFR 15-29 ML/MIN (H): Primary | ICD-10-CM

## 2023-10-09 DIAGNOSIS — N18.32 STAGE 3B CHRONIC KIDNEY DISEASE (CKD) (H): ICD-10-CM

## 2023-10-09 PROCEDURE — 99214 OFFICE O/P EST MOD 30 MIN: CPT | Performed by: INTERNAL MEDICINE

## 2023-10-09 RX ORDER — INSULIN HUMAN 100 [IU]/ML
INJECTION, SUSPENSION SUBCUTANEOUS
Qty: 45 ML | Refills: 1 | Status: SHIPPED | OUTPATIENT
Start: 2023-10-09 | End: 2024-01-16

## 2023-10-09 NOTE — PROGRESS NOTES
Nephrology Note: Patient Outreach Encounter    REASON FOR CALL:     REASON FOR CALL: No chief complaint on file.                                  SITUATION/BACKROUND:   Patient is being treated for CKD Stage 4.    Patient added to care coordination per Dr Shelton  Referral ordered    Patient Journey Status:  Active and care coordination    ASSESSMENT:     Patient has been having 3 weeks of diarrhea. Monitoring labs     Neph Assessments:  Uremic Symptoms     Volume Status       Fluid Intake       PLAN:     Education:  Method:  general discussion/verbal explanation  Discussed: labs  monitoring fluid volume status  These interventions were used: Reflection- simple or complex  Education material provided and patient was given an opportunity to ask questions.      Follow Up:   Follow up call in 1-2 weeks     Patient verbalized understanding and will follow up as recommended.    ALLISON HUNT RN

## 2023-10-09 NOTE — PROGRESS NOTES
Assessment and Plan:  67 year old male with history of CAD s/p CABG with ICM s/p OHT in 2020, CKD with baseline Scr 1.2-1.7 before transplant, up to 2.2 since Fall 2022, diabetes for 20 years, who presents for evaluation.  His Scr is near 2.2, up from 1.7 in Fall 2022.    # CKD:  He has long standing CKD, baseline 1.2-1.7 before transplant, and was relatively stable, on combination of tacrolimus and sirolimus but up to 2.2 in recent monts. He was started on lisinopril in October 2022 for hypertension which may have a hemodynamic effect to explain this  - he has had proteinuria, up to 1800mg/g albumin in 2014, which improved and now has low grade albuminuria.  - sirolimus can cause proteinuria  - on lisinopril and furosemide- will HOLD until labs today given 3 weeks of diarrhea and BP low normal.  - consider SGLT2 inhbitor in future if GFR >20- will discuss with heart failure team and endocrine team  - check cystatin C  # Hypertension: blood pressure near goal in the 130s at home  - on lisinopril and furosemide  - holding until labs result and his diarrhea improves    # Anemia in chronic renal disease:   - Hgb: ok when last checked  - Iron studies:  check iron sat and B12  every 6 months    # Electrolytes:   - Potassium; level: High normal- labs pending today  - Bicarbonate; level: High normal- pending today- has had diarrhea    # Mineral Bone Disorder:   - Vitamin D; level is: Normal  - Calcium; level is:  Normal     Assessment and plan was discussed with patient and he voiced his understanding and agreement.  30 minutes spent on day of service in review of records and coordination of care.     Consult:  Lucian Oliveira was seen in consultation at the request of Dr. Sheridan for CKD management.    HPI:  He is a very pleasant male with significant cardiac history, with MI and ICM, s/p OHT in July 2020, CKD predating transplant with baseline 1.2-1.7 before and now up to 2.2 range.  He has been doing fairly well  from transplant perspective and his medications are stable.  He was started on lisinopril for his blood pressure in Fall 2022.  He is on trulicity for diabetes and is trying to eat heathy and exercise  He is accompanied by his wife, who speaks a little bit of English.    He denies shortness of breath, he has some swelling which is manageable.  He takes his medications and has no major concerns.  He denies family history of kidney failure, but does have family history of diabetes.  He has diabetic retinopathy.      Today:   He has had loose stools, up to 4-5 times a day, normal was 1-2 times previously.  He has lost weight  His BP at home has been near 130  He has been having dizziness for the last week and thinks he may be dehydrated  He is drinking water and pedialyte, 3-4 bottles.  He has not discussed the diarrhea and weight loss with his transplant team.    Renal History:   Kidney Disease and Medical Hx:  h/o HTN: Yes   and h/o DM: Yes     ROS:   A comprehensive review of systems was obtained and negative, except as noted in the HPI or PMH.    Active Medical Problems:  Patient Active Problem List   Diagnosis    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)    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)    Hyperlipidemia LDL goal <100    Hypertension goal BP (blood pressure) < 140/90    CHF (NYHA class II, ACC/AHA stage C) (H)    CKD (chronic kidney disease) stage 3, GFR 30-59 ml/min (H)    Health Care Home    Automatic implantable cardioverter-defibrillator - [employer] single lead ICD, Not Dependent    Chronic systolic heart failure (H)    Proteinuria    Vitamin D deficiency    OA (osteoarthritis) of knee    Chondromalacia of patella, unspecified laterality    Pain in joint of left shoulder    Tinnitus    Ptosis of right eyelid    Secondary renal hyperparathyroidism (H24)    Cortical cataract of both eyes    Moderate nonproliferative  diabetic retinopathy, without macular edema, associated with type 2 diabetes mellitus    CHANTEL (obstructive sleep apnea)- severe (AHI 30)    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, unspecified whether long term insulin use (H)    Nuclear cataract, nonsenile    Coronary artery disease involving native coronary artery of native heart without angina pectoris    Status post coronary angiogram    Dental caries    Heart transplant, orthotopic, status (H)    Heart replaced by transplant (H)    Sepsis (H)    Need for prophylactic antibiotic    Traction detachment of left retina    Immunodeficiency, unspecified (H24)    CKD (chronic kidney disease) stage 4, GFR 15-29 ml/min (H)    HIT (heparin-induced thrombocytopenia) (H24)    Atrial flutter, unspecified type (H)    Acute embolism and thrombosis of right axillary vein (H)    Nuclear senile cataract of right eye    Lipid screening    Prostate cancer screening    Type 2 diabetes mellitus with diabetic nephropathy, with long-term current use of insulin (H)     PMH:   Medical record was reviewed and PMH was discussed with patient and noted below.  Past Medical History:   Diagnosis Date    CAD (coronary artery disease)     CHF (congestive heart failure) (H)     CKD (chronic kidney disease), stage III (H)     Cortical cataract of both eyes     Diabetes (H)     Hyperlipidemia     Hypertension     Infection due to Streptococcus mitis group 09/23/2020    Ischemic cardiomyopathy     Obesity     CHANTEL (obstructive sleep apnea)     occas cpap    CHANTEL (obstructive sleep apnea)- severe (AHI 30)     Osteoarthritis      PSH:   Past Surgical History:   Procedure Laterality Date    CATARACT IOL, RT/LT      COLONOSCOPY N/A 8/7/2019    Procedure: COLONOSCOPY, WITH POLYPECTOMY AND BIOPSY;  Surgeon: Chauncey Morataya MD;  Location:  GI    COLONOSCOPY N/A 5/12/2023    Procedure: Colonoscopy;  Surgeon: Mariano Velasquez MD;  Location:   GI    CV ANGIOGRAM CORONARY GRAFT N/A 7/2/2020    Procedure: Angiogram Coronary Graft;  Surgeon: Alex Lantigua MD;  Location:  HEART CARDIAC CATH LAB    CV CORONARY ANGIOGRAM N/A 7/2/2020    Procedure: CV CORONARY ANGIOGRAM;  Surgeon: Alex Lantigua MD;  Location:  HEART CARDIAC CATH LAB    CV CORONARY ANGIOGRAM N/A 7/20/2021    Procedure: CV CORONARY ANGIOGRAM;  Surgeon: Raimundo Hudson MD;  Location:  HEART CARDIAC CATH LAB    CV CORONARY ANGIOGRAM N/A 9/12/2022    Procedure: Coronary Angiogram- BIPLANE;  Surgeon: Raimundo Hudson MD;  Location:  HEART CARDIAC CATH LAB    CV CORONARY ANGIOGRAM N/A 7/17/2023    Procedure: Coronary Angiogram;  Surgeon: Alex Lantigua MD;  Location:  HEART CARDIAC CATH LAB    CV HEART BIOPSY N/A 7/27/2020    Procedure: Heart Biopsy;  Surgeon: Mario Burr MD;  Location:  HEART CARDIAC CATH LAB    CV HEART BIOPSY N/A 8/3/2020    Procedure: CV HEART BIOPSY;  Surgeon: Alex Lantigua MD;  Location:  HEART CARDIAC CATH LAB    CV HEART BIOPSY N/A 8/10/2020    Procedure: CV HEART BIOPSY;  Surgeon: Mario Burr MD;  Location:  HEART CARDIAC CATH LAB    CV HEART BIOPSY N/A 8/17/2020    Procedure: CV HEART BIOPSY;  Surgeon: Raimundo Hudson MD;  Location:  HEART CARDIAC CATH LAB    CV HEART BIOPSY N/A 8/31/2020    Procedure: CV HEART BIOPSY;  Surgeon: Moises Santos MD;  Location:  HEART CARDIAC CATH LAB    CV HEART BIOPSY N/A 9/14/2020    Procedure: CV HEART BIOPSY;  Surgeon: Raimundo Hudson MD;  Location:  HEART CARDIAC CATH LAB    CV HEART BIOPSY N/A 9/28/2020    Procedure: CV HEART BIOPSY;  Surgeon: Raimundo Hudson MD;  Location:  HEART CARDIAC CATH LAB    CV HEART BIOPSY N/A 10/12/2020    Procedure: CV HEART BIOPSY;  Surgeon: John Stuart MD;  Location:  HEART CARDIAC CATH LAB    CV HEART BIOPSY N/A 11/10/2020    Procedure: CV HEART BIOPSY;   Surgeon: John Stuart MD;  Location:  HEART CARDIAC CATH LAB    CV HEART BIOPSY N/A 12/7/2020    Procedure: CV HEART BIOPSY;  Surgeon: Raimundo Hudson MD;  Location:  HEART CARDIAC CATH LAB    CV HEART BIOPSY N/A 1/5/2021    Procedure: CV HEART BIOPSY;  Surgeon: Raimundo Hudson MD;  Location:  HEART CARDIAC CATH LAB    CV HEART BIOPSY N/A 7/20/2021    Procedure: CV HEART BIOPSY;  Surgeon: Raimundo Hudson MD;  Location:  HEART CARDIAC CATH LAB    CV HEART BIOPSY N/A 9/28/2021    Procedure: CV HEART BIOPSY;  Surgeon: Raimundo Hudson MD;  Location:  HEART CARDIAC CATH LAB    CV HEART BIOPSY N/A 9/12/2022    Procedure: Heart Biopsy;  Surgeon: Raimundo Hudson MD;  Location:  HEART CARDIAC CATH LAB    CV HEART BIOPSY N/A 7/17/2023    Procedure: Heart Biopsy;  Surgeon: Alex Lantigua MD;  Location:  HEART CARDIAC CATH LAB    CV INTRA AORTIC BALLOON N/A 7/14/2020    Procedure: RHC WITH LEAVE IN [place]/IABP;  Surgeon: Sonny Saleh MD;  Location:  HEART CARDIAC CATH LAB    CV INTRAVASULAR ULTRASOUND N/A 9/12/2022    Procedure: Intravascular Ultrasound;  Surgeon: Raimundo Hudson MD;  Location:  HEART CARDIAC CATH LAB    CV RIGHT HEART CATH MEASUREMENTS RECORDED N/A 3/25/2019    Procedure: CV RIGHT HEART CATH;  Surgeon: Moises Santos MD;  Location:  HEART CARDIAC CATH LAB    CV RIGHT HEART CATH MEASUREMENTS RECORDED N/A 7/10/2019    Procedure: CV RIGHT HEART CATH;  Surgeon: Jak Mccabe MD;  Location:  HEART CARDIAC CATH LAB    CV RIGHT HEART CATH MEASUREMENTS RECORDED N/A 7/8/2020    Procedure: Right Heart Cath with Leave In [place] already has ICU load;  Surgeon: Jak Mccabe MD;  Location:  HEART CARDIAC CATH LAB    CV RIGHT HEART CATH MEASUREMENTS RECORDED N/A 7/14/2020    Procedure: CV RIGHT HEART CATH;  Surgeon: Sonny Saleh MD;  Location:  HEART CARDIAC CATH LAB    CV  RIGHT HEART CATH MEASUREMENTS RECORDED N/A 7/2/2020    Procedure: CV RIGHT HEART CATH;  Surgeon: Alex Lantigua MD;  [facility]: [facility]    CV RIGHT HEART CATH MEASUREMENTS RECORDED N/A 7/27/2020    Procedure: Right Heart Cath;  Surgeon: Mario Burr MD;  Location:  HEART CARDIAC CATH LAB    CV RIGHT HEART CATH MEASUREMENTS RECORDED N/A 8/3/2020    Procedure: Right Heart Cath;  Surgeon: Alex Lantigua MD;  Location: [facility]    CV RIGHT HEART CATH MEASUREMENTS RECORDED N/A 8/10/2020    Procedure: CV RIGHT HEART CATH;  Surgeon: Mario Burr MD;  Location:  HEART CARDIAC CATH LAB    CV RIGHT HEART CATH MEASUREMENTS RECORDED N/A 8/17/2020    Procedure: CV RIGHT HEART CATH;  Surgeon: Raimundo Hudson MD;  Location:  HEART CARDIAC CATH LAB    CV RIGHT HEART CATH MEASUREMENTS RECORDED N/A 8/31/2020    Procedure: CV RIGHT HEART CATH;  Surgeon: Moises Santos MD;  Location:  HEART CARDIAC CATH LAB    CV RIGHT HEART CATH MEASUREMENTS RECORDED N/A 9/14/2020    Procedure: CV RIGHT HEART CATH;  Surgeon: Raimundo Hudson MD;  Location:  HEART CARDIAC CATH LAB    CV RIGHT HEART CATH MEASUREMENTS RECORDED N/A 9/28/2020    Procedure: CV RIGHT HEART CATH;  Surgeon: Raimundo Hudson MD;  Location:  HEART CARDIAC CATH LAB    CV RIGHT HEART CATH MEASUREMENTS RECORDED N/A 10/12/2020    Procedure: CV RIGHT HEART CATH;  Surgeon: John Stuart MD;  Location: [facility]    CV RIGHT HEART CATH MEASUREMENTS RECORDED N/A 11/10/2020    Procedure: CV RIGHT HEART CATH;  Surgeon: John Stuart MD;  Location: [facility]    CV RIGHT HEART CATH MEASUREMENTS RECORDED N/A 12/7/2020    Procedure: CV RIGHT HEART CATH;  Surgeon: Raimundo Hudson MD;  Location:  HEART CARDIAC CATH LAB    CV RIGHT HEART CATH MEASUREMENTS RECORDED N/A 1/5/2021    Procedure: CV RIGHT HEART CATH;  Surgeon:  Raimundo Hudson MD;  Location:  HEART CARDIAC CATH LAB    CV RIGHT HEART CATH MEASUREMENTS RECORDED N/A 7/20/2021    Procedure: CV RIGHT HEART CATH;  Surgeon: Raimundo Hudson MD;  Location: [facility]    CV RIGHT HEART CATH MEASUREMENTS RECORDED N/A 9/28/2021    Procedure: CV RIGHT HEART CATH;  Surgeon: Raimundo Hudson MD;  Location: [facility]    CV RIGHT HEART CATH MEASUREMENTS RECORDED N/A 9/12/2022    Procedure: Right Heart Catheterization;  Surgeon: Raimundo Hudson MD;  Location:  HEART CARDIAC CATH LAB    CV RIGHT HEART CATH MEASUREMENTS RECORDED N/A 7/17/2023    Procedure: Right Heart Catheterization;  Surgeon: Alex Lantigua MD;  Location:  HEART CARDIAC CATH LAB    EXTRACTION(S) DENTAL Left 7/13/2020    Procedure: EXTRACTION, TOOTH #11, 12, 13, 15, and 29;  Surgeon: Monica Chao DDS;  [facility]    IMPLANT AUTOMATIC IMPLANTABLE CARDIOVERTER DEFIBRILLATOR      INCISION AND DRAINAGE STERNUM W/ IRRIGATION SYSTEM, COMBINED N/A 9/23/2020    Procedure: INCISION AND DRAINAGE, LEFT SUPRACLAVICULAR WOUND INFECTION AND ABDOMENN WOUND.;  Surgeon: Ran Huertas MD;  [facility]    INSERT INTRAAORTIC BALLOON PUMP N/A 7/16/2020    Procedure: Subclavian Intra-Aortic Balloon Pump Placement;  Surgeon: Allan Sparrow MD;  [facility]    IRRIGATION AND DEBRIDEMENT CHEST WASHOUT, COMBINED N/A 9/28/2020    Procedure: IRRIGATION AND DEBRIDEMENT OF LEFT UPPER CHEST WOUND.;  Surgeon: Ran Huertas MD;  [facility]    PHACOEMULSIFICATION CLEAR CORNEA WITH STANDARD INTRAOCULAR LENS IMPLANT Right 2/6/2023    Procedure: RIGHT EYE PHACOEMULSIFICATION, CATARACT, WITH INTRAOCULAR LENS IMPLANT with trypan blue;  Surgeon: Triny Bunch MD;  Location: [facility]    PHACOEMULSIFICATION CLEAR CORNEA WITH STANDARD IOL, VITRECTOMY PARSPLANA 25 GAGUE, COMBINED Left 12/10/2019    Procedure: PHACOEMULSIFICATION,  CATARACT, CLEAR CORNEAL INCISION APPROACH, W STD INTRAOCULAR LENS IMPLANT INSERT + VITRECTOMY BY PARS PLANA  USING 25-GAUGE INSTRUMENTS. ENDOLASER, INFUSION OF 20% SF6 GAS;  Surgeon: Triny Bunch MD;  [facility]    PICC DOUBLE LUMEN PLACEMENT Left 07/28/2020    5Fr - 42cm, Basilic vein, mid SVC    PICC INSERTION Right 07/11/2020    basilic 44 cm total     TRANSPLANT HEART RECIPIENT N/A 7/19/2020    Procedure: REDO MEDIAN STERNOTOMY, TRANSPLANT, ORTHOTOPIC HEART, RECIPIENT, ON PUMP OXYGENATOR, REMOVAL OF CARDIAC DEFIBRILLATOR AND LEAD;  Surgeon: Griselli, Massimo, MD;  [facility]    VITRECTOMY, PARS PLANA APPROACH, USING 27-GAUGE INSTRUMENTS Left 12/21/2020    Procedure: 27 gauge pars plana vitectomy, membrane peel, perfluoroctane liquid (PFO), retinectomy, endolaser, Silicone Oil 1000 cs;  Surgeon: Triny Bunch MD;  Location:  OR    [facility] CABG, ARTERY-VEIN, THREE  02/2008       Family Hx:   Family History   Problem Relation Age of Onset    Diabetes Sister     Diabetes Sister     Diabetes Brother     Macular Degeneration No family hx of     Glaucoma No family hx of     Myocardial Infarction No family hx of     Kidney Disease No family hx of     Anesthesia Reaction No family hx of     Bleeding Disorder No family hx of     Venous thrombosis No family hx of      Personal Hx:   Social History     Tobacco Use    Smoking status: Never    Smokeless tobacco: Never    Tobacco comments:     Never smoked; non-smoking household   Substance Use Topics    Alcohol use: No     Alcohol/week: 0.0 standard drinks of alcohol       Allergies:  Allergies   Allergen Reactions    Heparin Heparin Induced Thrombocytopenia     HIT screen sent 7/18/2020. CORRINE confirmed positive       Medications:  Current Outpatient Medications   Medication Sig    blood glucose (NO BRAND SPECIFIED) test strip Use to test blood sugar 4 times daily or as directed.    blood glucose monitoring (ACCU-CHEK FELIPE SMARTVIEW) meter device  kit Use to test blood sugar 3-4 times daily, as directed.    blood glucose monitoring (SOFTCLIX) lancets 1 each 4 times daily Use to test blood sugars 2 times daily.    Dulaglutide (TRULICITY) 4.5 MG/0.5ML SOPN Inject 4.5 mg Subcutaneous once a week    empagliflozin (JARDIANCE) 10 MG TABS tablet Take 1 tablet (10 mg) by mouth daily    ferrous sulfate (FEROSUL) 325 (65 Fe) MG tablet Take 1 tablet (325 mg) by mouth daily (with breakfast)    furosemide (LASIX) 20 MG tablet Take 1 tablet (20 mg) by mouth daily    lisinopril (ZESTRIL) 10 MG tablet TAKE ONE TABLET BY MOUTH TWICE A DAY    magnesium oxide 400 MG tablet Take 1 tablet (400 mg) by mouth 2 times daily    ofloxacin (OCUFLOX) 0.3 % ophthalmic solution Place 1 drop into the right eye 4 times daily    omega-3 acid ethyl esters (LOVAZA) 1 g capsule Take 1 capsule (1 g) by mouth daily    prednisoLONE acetate (PRED FORTE) 1 % ophthalmic suspension Place 1 drop into the right eye 2 times daily    sulfamethoxazole-trimethoprim (BACTRIM) 400-80 MG tablet Take 1 tablet by mouth three times a week In the AM    tamsulosin (FLOMAX) 0.4 MG capsule TAKE ONE CAPSULE BY MOUTH ONCE DAILY    acetaminophen (TYLENOL) 325 MG tablet Take 3 tablets (975 mg) by mouth every 8 hours as needed for mild pain    Alcohol Swabs PADS Use to swab the area of the injection or sergio as directed   Per insurance coverage    aspirin (ASA) 81 MG chewable tablet Take 1 tablet (81 mg) by mouth daily (Patient taking differently: Take 81 mg by mouth every morning)    bisacodyl (DULCOLAX) 5 MG EC tablet 2 days prior to procedure, take 2 tablets at 4 pm. 1 day prior to procedure, take 2 tablets at 4 pm. For additional instructions refer to your colonoscopy prep instructions. (Patient not taking: Reported on 10/9/2023)    Calcium Carb-Cholecalciferol (CALCIUM CARBONATE-VITAMIN D3) 600-400 MG-UNIT TABS TAKE ONE TABLET BY MOUTH TWICE A DAY WITH MEALS    HUMALOG KWIKPEN 100 UNIT/ML soln Inject 5-20 Units  Subcutaneous 3 times daily (before meals) Max daily dose 60 units.    HUMALOG KWIKPEN 100 UNIT/ML soln INJECT 8 TO 14 UNITS UNDER THE SKIN THREE TIMES DAILY BEFORE A MEAL PER SLIDING SCALE AS DIRECTED    insulin NPH (HUMULIN N KWIKPEN) 100 UNIT/ML injection GIve 40 units each morning and 12 at night subcutaneous    insulin  UNIT/ML injection GIve 40 units each morning and 12-16  units each evening BEFORE meals (Patient taking differently: Inject 40 Units Subcutaneous 2 times daily GIve 40 units each morning and 12 at night)    insulin pen needle (32G X 4 MM) 32G X 4 MM miscellaneous Use 5-6 pen needles daily or as directed.    ketorolac (ACULAR) 0.5 % ophthalmic solution Place 1 drop into the right eye 2 times daily    mycophenolate (GENERIC EQUIVALENT) 500 MG tablet Take 3 tablets (1,500 mg) by mouth 2 times daily    order for DME Auto CPAP 8-15 cmH20    polyethylene glycol (GOLYTELY) 236 g suspension 2 days prior at 5pm, mix and drink half of a jug of Golytely. Drink an 8 oz. glass of Golytely every 15 minutes until half of the jug is gone. Place remainder of Golytely in the refrigerator. 1 day prior at 5 pm, drink the 2nd half of a jug of Golytely bowel prep. 6 hours before your check-in time, drink an 8 oz. glass of Golytely every 15 minutes until half of the 2nd jug of Golytely is gone. Discard remainder of second jug.    rosuvastatin (CRESTOR) 20 MG tablet Take 1 tablet (20 mg) by mouth daily    senna-docusate (SENOKOT-S/PERICOLACE) 8.6-50 MG tablet Take 1 tablet by mouth 2 times daily as needed (hold for loose stools) (Patient not taking: Reported on 10/9/2023)    sirolimus (GENERIC EQUIVALENT) 0.5 MG tablet Take 6 tablets (3 mg) by mouth daily     Current Facility-Administered Medications   Medication    bevacizumab (AVASTIN) intravitreal inj 1.25 mg    bevacizumab (AVASTIN) intravitreal inj 1.25 mg    bevacizumab (AVASTIN) intravitreal inj 1.25 mg    bevacizumab (AVASTIN) intravitreal inj 1.25 mg       Vitals:  /56   Pulse 96   Wt 74.8 kg (165 lb)   BMI 27.46 kg/m      Exam:  GENERAL APPEARANCE: alert and no distress  HENT: mouth without ulcers or lesions  LYMPHATICS: no cervical or supraclavicular nodes  RESP: lungs clear to auscultation - no rales, rhonchi or wheezes  CV: regular rhythm, normal rate, no rub, no murmur  EDEMA: mild LE edema bilaterally  ABDOMEN: soft, nondistended, nontender, bowel sounds normal  MS: extremities normal - no gross deformities noted, no evidence of inflammation in joints, no muscle tenderness  SKIN: no rash    LABS:   CMP  Recent Labs   Lab Test 08/14/23  0917 07/28/23  1205 07/17/23  1117 07/17/23  0908 05/12/23  0825 05/03/23  0844 07/19/21  0950 05/11/21  0905 03/02/21  0818 01/14/21  0811 01/05/21  0816   * 132*  --  140  --  142  141   < > 141 142 137 139   POTASSIUM 5.1 4.8  --  3.9  --  5.1  4.8   < > 4.0 4.2 4.8 4.3   CHLORIDE 103 99  --  103  --  105  105   < > 107 108 106 107   CO2 23 25  --  24  --  23  25   < > 27 27 28 25   ANIONGAP 8 8  --  13  --  14  11   < > 6 7 4 7   * 246* 115* 143*   < > 136*  131*   < > 98 110* 145* 137*   BUN 34.2* 33.6*  --  22.4  --  31.0*  31.0*   < > 34* 36* 28 28   CR 2.40* 2.52*  --  1.95*  --  2.23*  2.23*   < > 1.84* 1.83* 1.69* 1.68*   GFRESTIMATED 28* 27*  --  37*  --  31*  31*   < > 37* 37* 41* 41*   GFRESTBLACK  --   --   --   --   --   --   --  43* 43* 48* 48*   BRYANNA 8.9 9.0  --  9.2  --  9.3  9.4   < > 9.0 9.2 9.2 9.2    < > = values in this interval not displayed.     Recent Labs   Lab Test 07/17/23  0908 02/02/23  1121 09/12/22  0854 03/14/22  1014   BILITOTAL 0.3 0.3 0.3 0.4   ALKPHOS 122 128 146* 118   ALT 17 19 23 20   AST 24 23 30 22     CBC  Recent Labs   Lab Test 07/28/23  1205 07/17/23  1041 07/17/23  0908 07/13/23  1235 05/03/23  0844   HGB 9.6* 9.6* 10.7* 10.6* 11.0*   WBC 4.9  --  5.4 5.9 5.7   RBC 3.44*  --  3.70* 3.77* 3.74*   HCT 30.1*  --  33.1* 33.6* 33.8*   MCV 88  --  90 89 90    MCH 27.9  --  28.9 28.1 29.4   MCHC 31.9  --  32.3 31.5 32.5   RDW 14.9  --  14.6 14.4 16.0*     --  185 217 192     URINE STUDIES  Recent Labs   Lab Test 07/28/23  1226 09/28/21  0926 09/25/20  1241 09/22/20  1448   COLOR Light Yellow Light Yellow Yellow Yellow   APPEARANCE Clear Clear Clear Clear   URINEGLC 100* 50* 70* 150*   URINEBILI Negative Negative Negative Negative   URINEKETONE Negative Negative Negative Negative   SG 1.014 1.016 1.018 1.017   UBLD Negative Negative Negative Negative   URINEPH 5.5 6.5 5.5 5.0   PROTEIN Negative 10* 10* 10*   NITRITE Negative Negative Negative Negative   LEUKEST Negative Negative Negative Negative   RBCU <1 0 0 <1   WBCU <1 0 0 1     Recent Labs   Lab Test 03/14/22  1408 10/03/18  0725 07/26/16  1006   [facility] 0.29* 0.06 0.18     PTH  Recent Labs   Lab Test 05/03/23  0844 08/16/18  0834 07/26/16  0953   PTHI 40 180* 110*     IRON STUDIES  Recent Labs   Lab Test 07/28/23  1205 07/17/23  0908 09/22/20  1256   IRON 80 56* 66   * 229* 272   IRONSAT 35 24 24   SEVERIANO 190 123 447*         Mónica Aravind Shelton MD

## 2023-10-09 NOTE — LETTER
10/9/2023       RE: Lucian Oliveira  [address] [facility] [address]     Dear Colleague,    Thank you for referring your patient, Lucian Oliveira, to the [facility] at [facility]. Please see a copy of my visit note below.    Assessment and Plan:  67 year old male with history of CAD s/p CABG with ICM s/p OHT in 2020, CKD with baseline Scr 1.2-1.7 before transplant, up to 2.2 since Fall 2022, diabetes for 20 years, who presents for evaluation.  His Scr is near 2.2, up from 1.7 in Fall 2022.    # CKD:  He has long standing CKD, baseline 1.2-1.7 before transplant, and was relatively stable, on combination of tacrolimus and sirolimus but up to 2.2 in recent monts. He was started on lisinopril in October 2022 for hypertension which may have a hemodynamic effect to explain this  - he has had proteinuria, up to 1800mg/g albumin in 2014, which improved and now has low grade albuminuria.  - sirolimus can cause proteinuria  - on lisinopril and furosemide- will HOLD until labs today given 3 weeks of diarrhea and BP low normal.  - consider SGLT2 inhbitor in future if GFR >20- will discuss with heart failure team and endocrine team  - check cystatin C  # Hypertension: blood pressure near goal in the 130s at home  - on lisinopril and furosemide  - holding until labs result and his diarrhea improves    # Anemia in chronic renal disease:   - Hgb: ok when last checked  - Iron studies:  check iron sat and B12  every 6 months    # Electrolytes:   - Potassium; level: High normal- labs pending today  - Bicarbonate; level: High normal- pending today- has had diarrhea    # Mineral Bone Disorder:   - Vitamin D; level is: Normal  - Calcium; level is:  Normal     Assessment and plan was discussed with patient and he voiced his understanding and agreement.  30 minutes spent on day of service in review of records and coordination of care.      Consult:  Lucian Oliveira was seen in consultation at the request of Dr. Sheridan for CKD management.    HPI:  He is a very pleasant male with significant cardiac history, with MI and ICM, s/p OHT in July 2020, CKD predating transplant with baseline 1.2-1.7 before and now up to 2.2 range.  He has been doing fairly well from transplant perspective and his medications are stable.  He was started on lisinopril for his blood pressure in Fall 2022.  He is on trulicity for diabetes and is trying to eat heathy and exercise  He is accompanied by his wife, who speaks a little bit of English.    He denies shortness of breath, he has some swelling which is manageable.  He takes his medications and has no major concerns.  He denies family history of kidney failure, but does have family history of diabetes.  He has diabetic retinopathy.      Today:   He has had loose stools, up to 4-5 times a day, normal was 1-2 times previously.  He has lost weight  His BP at home has been near 130  He has been having dizziness for the last week and thinks he may be dehydrated  He is drinking water and pedialyte, 3-4 bottles.  He has not discussed the diarrhea and weight loss with his transplant team.    Renal History:   Kidney Disease and Medical Hx:  h/o HTN: Yes   and h/o DM: Yes     ROS:   A comprehensive review of systems was obtained and negative, except as noted in the HPI or PMH.    Active Medical Problems:  Patient Active Problem List   Diagnosis    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)    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)    Hyperlipidemia LDL goal <100    Hypertension goal BP (blood pressure) < 140/90    CHF (NYHA class II, ACC/AHA stage C) (H)    CKD (chronic kidney disease) stage 3, GFR 30-59 ml/min (H)    Health Care Home    Automatic implantable cardioverter-defibrillator - [employer] single lead ICD, Not Dependent     Chronic systolic heart failure (H)    Proteinuria    Vitamin D deficiency    OA (osteoarthritis) of knee    Chondromalacia of patella, unspecified laterality    Pain in joint of left shoulder    Tinnitus    Ptosis of right eyelid    Secondary renal hyperparathyroidism (H24)    Cortical cataract of both eyes    Moderate nonproliferative diabetic retinopathy, without macular edema, associated with type 2 diabetes mellitus    CHANTEL (obstructive sleep apnea)- severe (AHI 30)    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, unspecified whether long term insulin use (H)    Nuclear cataract, nonsenile    Coronary artery disease involving native coronary artery of native heart without angina pectoris    Status post coronary angiogram    Dental caries    Heart transplant, orthotopic, status (H)    Heart replaced by transplant (H)    Sepsis (H)    Need for prophylactic antibiotic    Traction detachment of left retina    Immunodeficiency, unspecified (H24)    CKD (chronic kidney disease) stage 4, GFR 15-29 ml/min (H)    HIT (heparin-induced thrombocytopenia) (H24)    Atrial flutter, unspecified type (H)    Acute embolism and thrombosis of right axillary vein (H)    Nuclear senile cataract of right eye    Lipid screening    Prostate cancer screening    Type 2 diabetes mellitus with diabetic nephropathy, with long-term current use of insulin (H)     PMH:   Medical record was reviewed and PMH was discussed with patient and noted below.  Past Medical History:   Diagnosis Date    CAD (coronary artery disease)     CHF (congestive heart failure) (H)     CKD (chronic kidney disease), stage III (H)     Cortical cataract of both eyes     Diabetes (H)     Hyperlipidemia     Hypertension     Infection due to Streptococcus mitis group 09/23/2020    Ischemic cardiomyopathy     Obesity     CHANTEL (obstructive sleep apnea)     occas cpap    CHANTEL (obstructive sleep apnea)- severe (AHI 30)     Osteoarthritis       PSH:   Past Surgical History:   Procedure Laterality Date    CATARACT IOL, RT/LT      COLONOSCOPY N/A 8/7/2019    Procedure: COLONOSCOPY, WITH POLYPECTOMY AND BIOPSY;  Surgeon: Chauncey Morataya MD;  [facility]    COLONOSCOPY N/A 5/12/2023    Procedure: Colonoscopy;  Surgeon: Mariano Velasquez MD;  [facility]    CV ANGIOGRAM CORONARY GRAFT N/A 7/2/2020    Procedure: Angiogram Coronary Graft;  Surgeon: Alex Lantigua MD;  [facility]    CV CORONARY ANGIOGRAM N/A 7/2/2020    Procedure: CV CORONARY ANGIOGRAM;  Surgeon: Alex Lantigua MD;  [facility]    CV CORONARY ANGIOGRAM N/A 7/20/2021    Procedure: CV CORONARY ANGIOGRAM;  Surgeon: Raimundo Hudson MD;  [facility]    CV CORONARY ANGIOGRAM N/A 9/12/2022    Procedure: Coronary Angiogram- BIPLANE;  Surgeon: Raimundo Hudson MD;  Location:  [facility]    CV CORONARY ANGIOGRAM N/A 7/17/2023    Procedure: Coronary Angiogram;  Surgeon: Alex Lantigua MD;  [facility]    CV HEART BIOPSY N/A 7/27/2020    Procedure: Heart Biopsy;  Surgeon: Mario Burr MD;  [facility]    CV HEART BIOPSY N/A 8/3/2020    Procedure: CV HEART BIOPSY;  Surgeon: Alex Lantigua MD;  [facility]    CV HEART BIOPSY N/A 8/10/2020    Procedure: CV HEART BIOPSY;  Surgeon: Mario Burr MD;  [facility]    CV HEART BIOPSY N/A 8/17/2020    Procedure: CV HEART BIOPSY;  Surgeon: Raimundo Hudson MD;  [facility]    CV HEART BIOPSY N/A 8/31/2020    Procedure: CV HEART BIOPSY;  Surgeon: Moises Santos MD;  [facility]    CV HEART BIOPSY N/A 9/14/2020    Procedure: CV HEART BIOPSY;  Surgeon: Raimundo Hudson MD;  Location:  [facility]    CV  HEART BIOPSY N/A 9/28/2020    Procedure: CV HEART BIOPSY;  Surgeon: Raimundo Hudson MD;  [facility]    CV HEART BIOPSY N/A 10/12/2020    Procedure: CV HEART BIOPSY;  Surgeon: John Stuart MD;  [facility]    CV HEART BIOPSY N/A 11/10/2020    Procedure: CV HEART BIOPSY;  Surgeon: John Stuart MD;  [facility]    CV HEART BIOPSY N/A 12/7/2020    Procedure: CV HEART BIOPSY;  Surgeon: Raimundo Hudson MD;  [facility]    CV HEART BIOPSY N/A 1/5/2021    Procedure: CV HEART BIOPSY;  Surgeon: Raimundo Hudson MD;  Location:  [facility]    CV HEART BIOPSY N/A 7/20/2021    Procedure: CV HEART BIOPSY;  Surgeon: Raimundo Hudson MD;  Location:  [facility]    CV HEART BIOPSY N/A 9/28/2021    Procedure: CV HEART BIOPSY;  Surgeon: Raimundo Hudson MD;  Location:  [facility]    CV HEART BIOPSY N/A 9/12/2022    Procedure: Heart Biopsy;  Surgeon: Raimundo Hudson MD;  Location:  [facility]    CV HEART BIOPSY N/A 7/17/2023    Procedure: Heart Biopsy;  Surgeon: Alex Lantigua MD;  Location:  [facility]    CV INTRA AORTIC BALLOON N/A 7/14/2020    Procedure: RHC WITH LEAVE IN SWAN/IABP;  Surgeon: Sonny Saleh MD;  Location:  [facility]    CV INTRAVASULAR ULTRASOUND N/A 9/12/2022    Procedure: Intravascular Ultrasound;  Surgeon: Raimundo Hudson MD;  Location:  [facility]    CV RIGHT HEART CATH MEASUREMENTS RECORDED N/A 3/25/2019    Procedure: CV RIGHT HEART CATH;  Surgeon: Moises Santos MD;  Location:  [facility]    CV RIGHT HEART CATH MEASUREMENTS RECORDED N/A 7/10/2019    Procedure: CV RIGHT HEART CATH;  Surgeon: Jak Mccabe MD;  Location:  [facility]    CV RIGHT HEART CATH MEASUREMENTS  RECORDED N/A 7/8/2020    Procedure: Right Heart Cath with Leave In [place] already has ICU load;  Surgeon: Jak Mccabe MD;  [facility]    CV RIGHT HEART CATH MEASUREMENTS RECORDED N/A 7/14/2020    Procedure: CV RIGHT HEART CATH;  Surgeon: Sonny Saleh MD;  [facility]    CV RIGHT HEART CATH MEASUREMENTS RECORDED N/A 7/2/2020    Procedure: CV RIGHT HEART CATH;  Surgeon: Alex Lantigua MD;  [facility]    CV RIGHT HEART CATH MEASUREMENTS RECORDED N/A 7/27/2020    Procedure: Right Heart Cath;  Surgeon: Mario Burr MD;  Location:  [facility]    CV RIGHT HEART CATH MEASUREMENTS RECORDED N/A 8/3/2020    Procedure: Right Heart Cath;  Surgeon: Alex Lantigua MD;  [facility]    CV RIGHT HEART CATH MEASUREMENTS RECORDED N/A 8/10/2020    Procedure: CV RIGHT HEART CATH;  Surgeon: Mario Burr MD;  [facility]    CV RIGHT HEART CATH MEASUREMENTS RECORDED N/A 8/17/2020    Procedure: CV RIGHT HEART CATH;  Surgeon: Raimundo Hudson MD;  Location:  [facility]    CV RIGHT HEART CATH MEASUREMENTS RECORDED N/A 8/31/2020    Procedure: CV RIGHT HEART CATH;  Surgeon: Moises Santos MD;  [facility]    CV RIGHT HEART CATH MEASUREMENTS RECORDED N/A 9/14/2020    Procedure: CV RIGHT HEART CATH;  Surgeon: Raimundo Hudson MD;  [facility]    CV RIGHT HEART CATH MEASUREMENTS RECORDED N/A 9/28/2020    Procedure: CV RIGHT HEART CATH;  Surgeon: Raimundo Hudson MD;  [facility]    CV RIGHT HEART CATH MEASUREMENTS RECORDED N/A 10/12/2020    Procedure: CV RIGHT HEART CATH;  Surgeon: John Stuart MD;  [facility]    CV RIGHT HEART CATH MEASUREMENTS RECORDED N/A 11/10/2020    Procedure: CV RIGHT HEART CATH;  Surgeon:  John Stuart MD;  Location:  [facility]    CV RIGHT HEART CATH MEASUREMENTS RECORDED N/A 12/7/2020    Procedure: CV RIGHT HEART CATH;  Surgeon: Raimundo Hudson MD;  Location:  [facility]    CV RIGHT HEART CATH MEASUREMENTS RECORDED N/A 1/5/2021    Procedure: CV RIGHT HEART CATH;  Surgeon: Raimundo Hudson MD;  Location:  [facility]    CV RIGHT HEART CATH MEASUREMENTS RECORDED N/A 7/20/2021    Procedure: CV RIGHT HEART CATH;  Surgeon: Raimundo Hudson MD;  Location:  [facility]    CV RIGHT HEART CATH MEASUREMENTS RECORDED N/A 9/28/2021    Procedure: CV RIGHT HEART CATH;  Surgeon: Raimundo Hudson MD;  Location:  [facility]    CV RIGHT HEART CATH MEASUREMENTS RECORDED N/A 9/12/2022    Procedure: Right Heart Catheterization;  Surgeon: Raimundo Hudson MD;  Location:  [facility]    CV RIGHT HEART CATH MEASUREMENTS RECORDED N/A 7/17/2023    Procedure: Right Heart Catheterization;  Surgeon: Alex Lantigua MD;  Location:  [facility]    EXTRACTION(S) DENTAL Left 7/13/2020    Procedure: EXTRACTION, TOOTH #11, 12, 13, 15, and 29;  Surgeon: Monica Chao DDS;  [facility]    IMPLANT AUTOMATIC IMPLANTABLE CARDIOVERTER DEFIBRILLATOR      INCISION AND DRAINAGE STERNUM W/ IRRIGATION SYSTEM, COMBINED N/A 9/23/2020    Procedure: INCISION AND DRAINAGE, LEFT SUPRACLAVICULAR WOUND INFECTION AND ABDOMENN WOUND.;  Surgeon: Ran Huertas MD;  [facility]    INSERT INTRAAORTIC BALLOON PUMP N/A 7/16/2020    Procedure: Subclavian Intra-Aortic Balloon Pump Placement;  Surgeon: Allan Sparrow MD;  [facility]    IRRIGATION AND DEBRIDEMENT CHEST WASHOUT, COMBINED N/A 9/28/2020    Procedure: IRRIGATION AND DEBRIDEMENT OF LEFT UPPER CHEST WOUND.;  Surgeon: Ran Huertas MD;  [facility]    PHACOEMULSIFICATION CLEAR CORNEA WITH STANDARD INTRAOCULAR  LENS IMPLANT Right 2/6/2023    Procedure: RIGHT EYE PHACOEMULSIFICATION, CATARACT, WITH INTRAOCULAR LENS IMPLANT with trypan blue;  Surgeon: Triny Bunch MD;  [facility]    PHACOEMULSIFICATION CLEAR CORNEA WITH STANDARD IOL, VITRECTOMY PARSPLANA 25 GAGUE, COMBINED Left 12/10/2019    Procedure: PHACOEMULSIFICATION, CATARACT, CLEAR CORNEAL INCISION APPROACH, W STD INTRAOCULAR LENS IMPLANT INSERT + VITRECTOMY BY PARS PLANA  USING 25-GAUGE INSTRUMENTS. ENDOLASER, INFUSION OF 20% SF6 GAS;  Surgeon: Triny Bunch MD;  [facility]    PICC DOUBLE LUMEN PLACEMENT Left 07/28/2020    5Fr - 42cm, Basilic vein, mid SVC    PICC INSERTION Right 07/11/2020    basilic 44 cm total     TRANSPLANT HEART RECIPIENT N/A 7/19/2020    Procedure: REDO MEDIAN STERNOTOMY, TRANSPLANT, ORTHOTOPIC HEART, RECIPIENT, ON PUMP OXYGENATOR, REMOVAL OF CARDIAC DEFIBRILLATOR AND LEAD;  Surgeon: Griselli, Massimo, MD;  [facility]    VITRECTOMY, PARS PLANA APPROACH, USING 27-GAUGE INSTRUMENTS Left 12/21/2020    Procedure: 27 gauge pars plana vitectomy, membrane peel, perfluoroctane liquid (PFO), retinectomy, endolaser, Silicone Oil 1000 cs;  Surgeon: Triny Bunch MD;  [facility]    ZZC CABG, ARTERY-VEIN, THREE  02/2008       Family Hx:   Family History   Problem Relation Age of Onset    Diabetes Sister     Diabetes Sister     Diabetes Brother     Macular Degeneration No family hx of     Glaucoma No family hx of     Myocardial Infarction No family hx of     Kidney Disease No family hx of     Anesthesia Reaction No family hx of     Bleeding Disorder No family hx of     Venous thrombosis No family hx of      Personal Hx:   Social History     Tobacco Use    Smoking status: Never    Smokeless tobacco: Never    Tobacco comments:     Never smoked; non-smoking household   Substance Use Topics    Alcohol use: No     Alcohol/week: 0.0 standard drinks of alcohol       Allergies:  Allergies   Allergen Reactions     Heparin Heparin Induced Thrombocytopenia     HIT screen sent 7/18/2020. CORRINE confirmed positive       Medications:  Current Outpatient Medications   Medication Sig    blood glucose (NO BRAND SPECIFIED) test strip Use to test blood sugar 4 times daily or as directed.    blood glucose monitoring (ACCU-CHEK FELIPE SMARTVIEW) meter device kit Use to test blood sugar 3-4 times daily, as directed.    blood glucose monitoring (SOFTCLIX) lancets 1 each 4 times daily Use to test blood sugars 2 times daily.    Dulaglutide (TRULICITY) 4.5 MG/0.5ML SOPN Inject 4.5 mg Subcutaneous once a week    empagliflozin (JARDIANCE) 10 MG TABS tablet Take 1 tablet (10 mg) by mouth daily    ferrous sulfate (FEROSUL) 325 (65 Fe) MG tablet Take 1 tablet (325 mg) by mouth daily (with breakfast)    furosemide (LASIX) 20 MG tablet Take 1 tablet (20 mg) by mouth daily    lisinopril (ZESTRIL) 10 MG tablet TAKE ONE TABLET BY MOUTH TWICE A DAY    magnesium oxide 400 MG tablet Take 1 tablet (400 mg) by mouth 2 times daily    ofloxacin (OCUFLOX) 0.3 % ophthalmic solution Place 1 drop into the right eye 4 times daily    omega-3 acid ethyl esters (LOVAZA) 1 g capsule Take 1 capsule (1 g) by mouth daily    prednisoLONE acetate (PRED FORTE) 1 % ophthalmic suspension Place 1 drop into the right eye 2 times daily    sulfamethoxazole-trimethoprim (BACTRIM) 400-80 MG tablet Take 1 tablet by mouth three times a week In the AM    tamsulosin (FLOMAX) 0.4 MG capsule TAKE ONE CAPSULE BY MOUTH ONCE DAILY    acetaminophen (TYLENOL) 325 MG tablet Take 3 tablets (975 mg) by mouth every 8 hours as needed for mild pain    Alcohol Swabs PADS Use to swab the area of the injection or sergio as directed   Per insurance coverage    aspirin (ASA) 81 MG chewable tablet Take 1 tablet (81 mg) by mouth daily (Patient taking differently: Take 81 mg by mouth every morning)    bisacodyl (DULCOLAX) 5 MG EC tablet 2 days prior to procedure, take 2 tablets at 4 pm. 1 day prior to procedure,  take 2 tablets at 4 pm. For additional instructions refer to your colonoscopy prep instructions. (Patient not taking: Reported on 10/9/2023)    Calcium Carb-Cholecalciferol (CALCIUM CARBONATE-VITAMIN D3) 600-400 MG-UNIT TABS TAKE ONE TABLET BY MOUTH TWICE A DAY WITH MEALS    HUMALOG KWIKPEN 100 UNIT/ML soln Inject 5-20 Units Subcutaneous 3 times daily (before meals) Max daily dose 60 units.    HUMALOG KWIKPEN 100 UNIT/ML soln INJECT 8 TO 14 UNITS UNDER THE SKIN THREE TIMES DAILY BEFORE A MEAL PER SLIDING SCALE AS DIRECTED    insulin NPH (HUMULIN N KWIKPEN) 100 UNIT/ML injection GIve 40 units each morning and 12 at night subcutaneous    insulin  UNIT/ML injection GIve 40 units each morning and 12-16  units each evening BEFORE meals (Patient taking differently: Inject 40 Units Subcutaneous 2 times daily GIve 40 units each morning and 12 at night)    insulin pen needle (32G X 4 MM) 32G X 4 MM miscellaneous Use 5-6 pen needles daily or as directed.    ketorolac (ACULAR) 0.5 % ophthalmic solution Place 1 drop into the right eye 2 times daily    mycophenolate (GENERIC EQUIVALENT) 500 MG tablet Take 3 tablets (1,500 mg) by mouth 2 times daily    order for DME Auto CPAP 8-15 cmH20    polyethylene glycol (GOLYTELY) 236 g suspension 2 days prior at 5pm, mix and drink half of a jug of Golytely. Drink an 8 oz. glass of Golytely every 15 minutes until half of the jug is gone. Place remainder of Golytely in the refrigerator. 1 day prior at 5 pm, drink the 2nd half of a jug of Golytely bowel prep. 6 hours before your check-in time, drink an 8 oz. glass of Golytely every 15 minutes until half of the 2nd jug of Golytely is gone. Discard remainder of second jug.    rosuvastatin (CRESTOR) 20 MG tablet Take 1 tablet (20 mg) by mouth daily    senna-docusate (SENOKOT-S/PERICOLACE) 8.6-50 MG tablet Take 1 tablet by mouth 2 times daily as needed (hold for loose stools) (Patient not taking: Reported on 10/9/2023)    sirolimus  (GENERIC EQUIVALENT) 0.5 MG tablet Take 6 tablets (3 mg) by mouth daily     Current Facility-Administered Medications   Medication    bevacizumab (AVASTIN) intravitreal inj 1.25 mg    bevacizumab (AVASTIN) intravitreal inj 1.25 mg    bevacizumab (AVASTIN) intravitreal inj 1.25 mg    bevacizumab (AVASTIN) intravitreal inj 1.25 mg      Vitals:  /56   Pulse 96   Wt 74.8 kg (165 lb)   BMI 27.46 kg/m      Exam:  GENERAL APPEARANCE: alert and no distress  HENT: mouth without ulcers or lesions  LYMPHATICS: no cervical or supraclavicular nodes  RESP: lungs clear to auscultation - no rales, rhonchi or wheezes  CV: regular rhythm, normal rate, no rub, no murmur  EDEMA: mild LE edema bilaterally  ABDOMEN: soft, nondistended, nontender, bowel sounds normal  MS: extremities normal - no gross deformities noted, no evidence of inflammation in joints, no muscle tenderness  SKIN: no rash    LABS:   CMP  Recent Labs   Lab Test 08/14/23  0917 07/28/23  1205 07/17/23  1117 07/17/23  0908 05/12/23  0825 05/03/23  0844 07/19/21  0950 05/11/21  0905 03/02/21  0818 01/14/21  0811 01/05/21  0816   * 132*  --  140  --  142  141   < > 141 142 137 139   POTASSIUM 5.1 4.8  --  3.9  --  5.1  4.8   < > 4.0 4.2 4.8 4.3   CHLORIDE 103 99  --  103  --  105  105   < > 107 108 106 107   CO2 23 25  --  24  --  23  25   < > 27 27 28 25   ANIONGAP 8 8  --  13  --  14  11   < > 6 7 4 7   * 246* 115* 143*   < > 136*  131*   < > 98 110* 145* 137*   BUN 34.2* 33.6*  --  22.4  --  31.0*  31.0*   < > 34* 36* 28 28   CR 2.40* 2.52*  --  1.95*  --  2.23*  2.23*   < > 1.84* 1.83* 1.69* 1.68*   GFRESTIMATED 28* 27*  --  37*  --  31*  31*   < > 37* 37* 41* 41*   GFRESTBLACK  --   --   --   --   --   --   --  43* 43* 48* 48*   BRYANNA 8.9 9.0  --  9.2  --  9.3  9.4   < > 9.0 9.2 9.2 9.2    < > = values in this interval not displayed.     Recent Labs   Lab Test 07/17/23  0908 02/02/23  1121 09/12/22  0854 03/14/22  1014   BILITOTAL 0.3 0.3  0.3 0.4   ALKPHOS 122 128 146* 118   ALT 17 19 23 20   AST 24 23 30 22     CBC  Recent Labs   Lab Test 07/28/23  1205 07/17/23  1041 07/17/23  0908 07/13/23  1235 05/03/23  0844   HGB 9.6* 9.6* 10.7* 10.6* 11.0*   WBC 4.9  --  5.4 5.9 5.7   RBC 3.44*  --  3.70* 3.77* 3.74*   HCT 30.1*  --  33.1* 33.6* 33.8*   MCV 88  --  90 89 90   MCH 27.9  --  28.9 28.1 29.4   MCHC 31.9  --  32.3 31.5 32.5   RDW 14.9  --  14.6 14.4 16.0*     --  185 217 192     URINE STUDIES  Recent Labs   Lab Test 07/28/23  1226 09/28/21  0926 09/25/20  1241 09/22/20  1448   COLOR Light Yellow Light Yellow Yellow Yellow   APPEARANCE Clear Clear Clear Clear   URINEGLC 100* 50* 70* 150*   URINEBILI Negative Negative Negative Negative   URINEKETONE Negative Negative Negative Negative   SG 1.014 1.016 1.018 1.017   UBLD Negative Negative Negative Negative   URINEPH 5.5 6.5 5.5 5.0   PROTEIN Negative 10* 10* 10*   NITRITE Negative Negative Negative Negative   LEUKEST Negative Negative Negative Negative   RBCU <1 0 0 <1   WBCU <1 0 0 1     Recent Labs   Lab Test 03/14/22  1408 10/03/18  0725 07/26/16  1006   [facility] 0.29* 0.06 0.18     PTH  Recent Labs   Lab Test 05/03/23  0844 08/16/18  0834 07/26/16  0953   PTHI 40 180* 110*     IRON STUDIES  Recent Labs   Lab Test 07/28/23  1205 07/17/23  0908 09/22/20  1256   IRON 80 56* 66   * 229* 272   IRONSAT 35 24 24   SEVERIANO 190 123 447*         Mónica Aravind Shelton MD

## 2023-10-10 ENCOUNTER — LAB (OUTPATIENT)
Dept: LAB | Facility: CLINIC | Age: 68
End: 2023-10-10
Payer: COMMERCIAL

## 2023-10-10 DIAGNOSIS — N18.4 CKD (CHRONIC KIDNEY DISEASE) STAGE 4, GFR 15-29 ML/MIN (H): ICD-10-CM

## 2023-10-10 LAB
ALBUMIN SERPL BCG-MCNC: 3.8 G/DL (ref 3.5–5.2)
ANION GAP SERPL CALCULATED.3IONS-SCNC: 17 MMOL/L (ref 7–15)
BASO+EOS+MONOS # BLD AUTO: ABNORMAL 10*3/UL
BASO+EOS+MONOS NFR BLD AUTO: ABNORMAL %
BASOPHILS # BLD AUTO: 0 10E3/UL (ref 0–0.2)
BASOPHILS NFR BLD AUTO: 0 %
BUN SERPL-MCNC: 73.7 MG/DL (ref 8–23)
CALCIUM SERPL-MCNC: 8.9 MG/DL (ref 8.8–10.2)
CHLORIDE SERPL-SCNC: 103 MMOL/L (ref 98–107)
CREAT SERPL-MCNC: 4.97 MG/DL (ref 0.67–1.17)
DEPRECATED HCO3 PLAS-SCNC: 12 MMOL/L (ref 22–29)
EGFRCR SERPLBLD CKD-EPI 2021: 12 ML/MIN/1.73M2
EOSINOPHIL # BLD AUTO: 0 10E3/UL (ref 0–0.7)
EOSINOPHIL NFR BLD AUTO: 0 %
ERYTHROCYTE [DISTWIDTH] IN BLOOD BY AUTOMATED COUNT: 14.5 % (ref 10–15)
GLUCOSE SERPL-MCNC: 214 MG/DL (ref 70–99)
HCT VFR BLD AUTO: 30.9 % (ref 40–53)
HGB BLD-MCNC: 10 G/DL (ref 13.3–17.7)
IMM GRANULOCYTES # BLD: 0 10E3/UL
IMM GRANULOCYTES NFR BLD: 0 %
LYMPHOCYTES # BLD AUTO: 1.3 10E3/UL (ref 0.8–5.3)
LYMPHOCYTES NFR BLD AUTO: 23 %
MCH RBC QN AUTO: 27.3 PG (ref 26.5–33)
MCHC RBC AUTO-ENTMCNC: 32.4 G/DL (ref 31.5–36.5)
MCV RBC AUTO: 84 FL (ref 78–100)
MONOCYTES # BLD AUTO: 0.4 10E3/UL (ref 0–1.3)
MONOCYTES NFR BLD AUTO: 7 %
NEUTROPHILS # BLD AUTO: 4 10E3/UL (ref 1.6–8.3)
NEUTROPHILS NFR BLD AUTO: 69 %
PHOSPHATE SERPL-MCNC: 4.8 MG/DL (ref 2.5–4.5)
PLATELET # BLD AUTO: 197 10E3/UL (ref 150–450)
POTASSIUM SERPL-SCNC: 4.2 MMOL/L (ref 3.4–5.3)
RBC # BLD AUTO: 3.66 10E6/UL (ref 4.4–5.9)
SODIUM SERPL-SCNC: 132 MMOL/L (ref 135–145)
WBC # BLD AUTO: 5.8 10E3/UL (ref 4–11)

## 2023-10-10 PROCEDURE — 82728 ASSAY OF FERRITIN: CPT | Performed by: INTERNAL MEDICINE

## 2023-10-10 PROCEDURE — 83540 ASSAY OF IRON: CPT | Performed by: INTERNAL MEDICINE

## 2023-10-10 PROCEDURE — 80069 RENAL FUNCTION PANEL: CPT

## 2023-10-10 PROCEDURE — 85025 COMPLETE CBC W/AUTO DIFF WBC: CPT

## 2023-10-10 PROCEDURE — 83550 IRON BINDING TEST: CPT | Performed by: INTERNAL MEDICINE

## 2023-10-10 PROCEDURE — 36415 COLL VENOUS BLD VENIPUNCTURE: CPT

## 2023-10-10 PROCEDURE — 80180 DRUG SCRN QUAN MYCOPHENOLATE: CPT

## 2023-10-10 NOTE — TELEPHONE ENCOUNTER
[facility] called to inquire if labs were drawn on patient post nephrology visit. Lab orders were not signed so no labs were drawn. Lab orders re entered and signed.  stated the patient was given a voucher to comm in today to have labs drawn.    Patient called x2. No answer and unable to leave message as vm has not been set up.  AURORA Hagen Care Coordinator  Nephrology

## 2023-10-11 ENCOUNTER — HOSPITAL ENCOUNTER (INPATIENT)
Facility: CLINIC | Age: 68
LOS: 4 days | Discharge: HOME | DRG: 371 | End: 2023-10-15
Attending: EMERGENCY MEDICINE | Admitting: INTERNAL MEDICINE
Payer: COMMERCIAL

## 2023-10-11 ENCOUNTER — PATIENT OUTREACH (OUTPATIENT)
Dept: NEPHROLOGY | Facility: CLINIC | Age: 68
End: 2023-10-11

## 2023-10-11 ENCOUNTER — APPOINTMENT (OUTPATIENT)
Dept: CT IMAGING | Facility: CLINIC | Age: 68
DRG: 371 | End: 2023-10-11
Payer: COMMERCIAL

## 2023-10-11 DIAGNOSIS — N17.9 ACUTE RENAL FAILURE SUPERIMPOSED ON CHRONIC KIDNEY DISEASE, UNSPECIFIED ACUTE RENAL FAILURE TYPE, UNSPECIFIED CKD STAGE (H): ICD-10-CM

## 2023-10-11 DIAGNOSIS — A09 DIARRHEA OF INFECTIOUS ORIGIN: ICD-10-CM

## 2023-10-11 DIAGNOSIS — E11.21 TYPE 2 DIABETES MELLITUS WITH DIABETIC NEPHROPATHY, WITH LONG-TERM CURRENT USE OF INSULIN (H): ICD-10-CM

## 2023-10-11 DIAGNOSIS — Z79.4 TYPE 2 DIABETES MELLITUS WITH CHRONIC KIDNEY DISEASE, WITH LONG-TERM CURRENT USE OF INSULIN, UNSPECIFIED CKD STAGE (H): ICD-10-CM

## 2023-10-11 DIAGNOSIS — Z79.4 TYPE 2 DIABETES MELLITUS WITH DIABETIC NEPHROPATHY, WITH LONG-TERM CURRENT USE OF INSULIN (H): ICD-10-CM

## 2023-10-11 DIAGNOSIS — Z94.1 HEART REPLACED BY TRANSPLANT (H): ICD-10-CM

## 2023-10-11 DIAGNOSIS — N18.9 ACUTE RENAL FAILURE SUPERIMPOSED ON CHRONIC KIDNEY DISEASE, UNSPECIFIED ACUTE RENAL FAILURE TYPE, UNSPECIFIED CKD STAGE (H): ICD-10-CM

## 2023-10-11 DIAGNOSIS — N17.9 ACUTE RENAL FAILURE, UNSPECIFIED ACUTE RENAL FAILURE TYPE (H): Primary | ICD-10-CM

## 2023-10-11 DIAGNOSIS — N18.9 CHRONIC KIDNEY DISEASE, UNSPECIFIED CKD STAGE: ICD-10-CM

## 2023-10-11 DIAGNOSIS — E11.22 TYPE 2 DIABETES MELLITUS WITH CHRONIC KIDNEY DISEASE, WITH LONG-TERM CURRENT USE OF INSULIN, UNSPECIFIED CKD STAGE (H): ICD-10-CM

## 2023-10-11 LAB
ALBUMIN SERPL BCG-MCNC: 4 G/DL (ref 3.5–5.2)
ALP SERPL-CCNC: 124 U/L (ref 40–129)
ALT SERPL W P-5'-P-CCNC: 20 U/L (ref 0–70)
ANION GAP SERPL CALCULATED.3IONS-SCNC: 20 MMOL/L (ref 7–15)
ANION GAP SERPL CALCULATED.3IONS-SCNC: 22 MMOL/L (ref 7–15)
AST SERPL W P-5'-P-CCNC: 22 U/L (ref 0–45)
B-OH-BUTYR SERPL-SCNC: 0.2 MMOL/L
BASE EXCESS BLDV CALC-SCNC: -17 MMOL/L (ref -7.7–1.9)
BASO+EOS+MONOS # BLD AUTO: ABNORMAL 10*3/UL
BASO+EOS+MONOS NFR BLD AUTO: ABNORMAL %
BASOPHILS # BLD AUTO: 0 10E3/UL (ref 0–0.2)
BASOPHILS NFR BLD AUTO: 0 %
BILIRUB SERPL-MCNC: 0.2 MG/DL
BUN SERPL-MCNC: 84.8 MG/DL (ref 8–23)
BUN SERPL-MCNC: 86.2 MG/DL (ref 8–23)
C DIFF GDH STL QL IA: POSITIVE
C DIFF TOX A+B STL QL IA: NEGATIVE
C DIFF TOX B STL QL: POSITIVE
CALCIUM SERPL-MCNC: 8 MG/DL (ref 8.8–10.2)
CALCIUM SERPL-MCNC: 8.6 MG/DL (ref 8.8–10.2)
CHLORIDE SERPL-SCNC: 101 MMOL/L (ref 98–107)
CHLORIDE SERPL-SCNC: 99 MMOL/L (ref 98–107)
CREAT SERPL-MCNC: 5.24 MG/DL (ref 0.67–1.17)
CREAT SERPL-MCNC: 5.77 MG/DL (ref 0.67–1.17)
DEPRECATED HCO3 PLAS-SCNC: 10 MMOL/L (ref 22–29)
DEPRECATED HCO3 PLAS-SCNC: 9 MMOL/L (ref 22–29)
EGFRCR SERPLBLD CKD-EPI 2021: 10 ML/MIN/1.73M2
EGFRCR SERPLBLD CKD-EPI 2021: 11 ML/MIN/1.73M2
EOSINOPHIL # BLD AUTO: 0 10E3/UL (ref 0–0.7)
EOSINOPHIL NFR BLD AUTO: 0 %
ERYTHROCYTE [DISTWIDTH] IN BLOOD BY AUTOMATED COUNT: 14.8 % (ref 10–15)
FERRITIN SERPL-MCNC: 236 NG/ML (ref 31–409)
GLUCOSE BLDC GLUCOMTR-MCNC: 135 MG/DL (ref 70–99)
GLUCOSE BLDC GLUCOMTR-MCNC: 150 MG/DL (ref 70–99)
GLUCOSE SERPL-MCNC: 185 MG/DL (ref 70–99)
GLUCOSE SERPL-MCNC: 192 MG/DL (ref 70–99)
HCO3 BLDV-SCNC: 11 MMOL/L (ref 21–28)
HCT VFR BLD AUTO: 30.7 % (ref 40–53)
HGB BLD-MCNC: 10.1 G/DL (ref 13.3–17.7)
HOLD SPECIMEN: NORMAL
HOLD SPECIMEN: NORMAL
IMM GRANULOCYTES # BLD: 0 10E3/UL
IMM GRANULOCYTES NFR BLD: 0 %
IRON BINDING CAPACITY (ROCHE): 238 UG/DL (ref 240–430)
IRON SATN MFR SERPL: 38 % (ref 15–46)
IRON SERPL-MCNC: 90 UG/DL (ref 61–157)
LACTATE SERPL-SCNC: 1.1 MMOL/L (ref 0.7–2)
LYMPHOCYTES # BLD AUTO: 1.1 10E3/UL (ref 0.8–5.3)
LYMPHOCYTES NFR BLD AUTO: 21 %
MAGNESIUM SERPL-MCNC: 1.8 MG/DL (ref 1.7–2.3)
MCH RBC QN AUTO: 27.4 PG (ref 26.5–33)
MCHC RBC AUTO-ENTMCNC: 32.9 G/DL (ref 31.5–36.5)
MCV RBC AUTO: 83 FL (ref 78–100)
MONOCYTES # BLD AUTO: 0.3 10E3/UL (ref 0–1.3)
MONOCYTES NFR BLD AUTO: 6 %
MYCOPHENOLATE SERPL LC/MS/MS-MCNC: 3.24 MG/L (ref 1–3.5)
MYCOPHENOLATE-G SERPL LC/MS/MS-MCNC: 179.6 MG/L (ref 30–95)
NEUTROPHILS # BLD AUTO: 3.8 10E3/UL (ref 1.6–8.3)
NEUTROPHILS NFR BLD AUTO: 73 %
NRBC # BLD AUTO: 0 10E3/UL
NRBC BLD AUTO-RTO: 0 /100
O2/TOTAL GAS SETTING VFR VENT: 21 %
OXYHGB MFR BLDV: 39 % (ref 70–75)
PCO2 BLDV: 34 MM HG (ref 40–50)
PH BLDV: 7.12 [PH] (ref 7.32–7.43)
PHOSPHATE SERPL-MCNC: 5.9 MG/DL (ref 2.5–4.5)
PLATELET # BLD AUTO: 203 10E3/UL (ref 150–450)
PO2 BLDV: 27 MM HG (ref 25–47)
POTASSIUM SERPL-SCNC: 3.7 MMOL/L (ref 3.4–5.3)
POTASSIUM SERPL-SCNC: 4.1 MMOL/L (ref 3.4–5.3)
PROT SERPL-MCNC: 7 G/DL (ref 6.4–8.3)
RBC # BLD AUTO: 3.69 10E6/UL (ref 4.4–5.9)
SARS-COV-2 RNA RESP QL NAA+PROBE: NEGATIVE
SODIUM SERPL-SCNC: 130 MMOL/L (ref 135–145)
SODIUM SERPL-SCNC: 131 MMOL/L (ref 135–145)
TME LAST DOSE: ABNORMAL H
TME LAST DOSE: ABNORMAL H
WBC # BLD AUTO: 5.2 10E3/UL (ref 4–11)

## 2023-10-11 PROCEDURE — 214N000001 HC R&B CCU UMMC

## 2023-10-11 PROCEDURE — 87177 OVA AND PARASITES SMEARS: CPT

## 2023-10-11 PROCEDURE — 82962 GLUCOSE BLOOD TEST: CPT

## 2023-10-11 PROCEDURE — 99291 CRITICAL CARE FIRST HOUR: CPT | Performed by: EMERGENCY MEDICINE

## 2023-10-11 PROCEDURE — 99291 CRITICAL CARE FIRST HOUR: CPT | Mod: 25 | Performed by: EMERGENCY MEDICINE

## 2023-10-11 PROCEDURE — 99223 1ST HOSP IP/OBS HIGH 75: CPT | Performed by: INTERNAL MEDICINE

## 2023-10-11 PROCEDURE — 74176 CT ABD & PELVIS W/O CONTRAST: CPT | Mod: 26 | Performed by: RADIOLOGY

## 2023-10-11 PROCEDURE — 84100 ASSAY OF PHOSPHORUS: CPT

## 2023-10-11 PROCEDURE — 87324 CLOSTRIDIUM AG IA: CPT | Performed by: EMERGENCY MEDICINE

## 2023-10-11 PROCEDURE — 36415 COLL VENOUS BLD VENIPUNCTURE: CPT | Performed by: EMERGENCY MEDICINE

## 2023-10-11 PROCEDURE — 82310 ASSAY OF CALCIUM: CPT

## 2023-10-11 PROCEDURE — 87493 C DIFF AMPLIFIED PROBE: CPT | Performed by: EMERGENCY MEDICINE

## 2023-10-11 PROCEDURE — 80053 COMPREHEN METABOLIC PANEL: CPT | Performed by: EMERGENCY MEDICINE

## 2023-10-11 PROCEDURE — 82805 BLOOD GASES W/O2 SATURATION: CPT

## 2023-10-11 PROCEDURE — 83605 ASSAY OF LACTIC ACID: CPT | Performed by: EMERGENCY MEDICINE

## 2023-10-11 PROCEDURE — 82010 KETONE BODYS QUAN: CPT

## 2023-10-11 PROCEDURE — 87040 BLOOD CULTURE FOR BACTERIA: CPT | Performed by: EMERGENCY MEDICINE

## 2023-10-11 PROCEDURE — 74176 CT ABD & PELVIS W/O CONTRAST: CPT

## 2023-10-11 PROCEDURE — 36415 COLL VENOUS BLD VENIPUNCTURE: CPT

## 2023-10-11 PROCEDURE — 250N000012 HC RX MED GY IP 250 OP 636 PS 637

## 2023-10-11 PROCEDURE — 258N000003 HC RX IP 258 OP 636: Performed by: EMERGENCY MEDICINE

## 2023-10-11 PROCEDURE — 250N000009 HC RX 250: Performed by: EMERGENCY MEDICINE

## 2023-10-11 PROCEDURE — 83735 ASSAY OF MAGNESIUM: CPT

## 2023-10-11 PROCEDURE — 87209 SMEAR COMPLEX STAIN: CPT

## 2023-10-11 PROCEDURE — 85004 AUTOMATED DIFF WBC COUNT: CPT | Performed by: EMERGENCY MEDICINE

## 2023-10-11 PROCEDURE — 99223 1ST HOSP IP/OBS HIGH 75: CPT | Mod: GC | Performed by: INTERNAL MEDICINE

## 2023-10-11 PROCEDURE — 87635 SARS-COV-2 COVID-19 AMP PRB: CPT

## 2023-10-11 PROCEDURE — 96360 HYDRATION IV INFUSION INIT: CPT | Performed by: EMERGENCY MEDICINE

## 2023-10-11 RX ORDER — NICOTINE POLACRILEX 4 MG
15-30 LOZENGE BUCCAL
Status: DISCONTINUED | OUTPATIENT
Start: 2023-10-11 | End: 2023-10-15 | Stop reason: HOSPADM

## 2023-10-11 RX ORDER — VANCOMYCIN HYDROCHLORIDE 125 MG/1
125 CAPSULE ORAL 4 TIMES DAILY
Status: DISCONTINUED | OUTPATIENT
Start: 2023-10-11 | End: 2023-10-11

## 2023-10-11 RX ORDER — OFLOXACIN 3 MG/ML
1 SOLUTION/ DROPS OPHTHALMIC 4 TIMES DAILY
Status: DISCONTINUED | OUTPATIENT
Start: 2023-10-11 | End: 2023-10-12

## 2023-10-11 RX ORDER — DEXTROSE MONOHYDRATE 25 G/50ML
25-50 INJECTION, SOLUTION INTRAVENOUS
Status: DISCONTINUED | OUTPATIENT
Start: 2023-10-11 | End: 2023-10-15 | Stop reason: HOSPADM

## 2023-10-11 RX ORDER — ACETAMINOPHEN 325 MG/1
650 TABLET ORAL EVERY 4 HOURS PRN
Status: DISCONTINUED | OUTPATIENT
Start: 2023-10-11 | End: 2023-10-15 | Stop reason: HOSPADM

## 2023-10-11 RX ORDER — SULFAMETHOXAZOLE AND TRIMETHOPRIM 400; 80 MG/1; MG/1
1 TABLET ORAL
Status: DISCONTINUED | OUTPATIENT
Start: 2023-10-12 | End: 2023-10-15 | Stop reason: HOSPADM

## 2023-10-11 RX ORDER — LISINOPRIL 10 MG/1
10 TABLET ORAL 2 TIMES DAILY
Status: DISCONTINUED | OUTPATIENT
Start: 2023-10-11 | End: 2023-10-15 | Stop reason: HOSPADM

## 2023-10-11 RX ORDER — KETOROLAC TROMETHAMINE 5 MG/ML
1 SOLUTION OPHTHALMIC 2 TIMES DAILY
Status: DISCONTINUED | OUTPATIENT
Start: 2023-10-11 | End: 2023-10-12

## 2023-10-11 RX ORDER — ROSUVASTATIN CALCIUM 10 MG/1
20 TABLET, COATED ORAL DAILY
Status: DISCONTINUED | OUTPATIENT
Start: 2023-10-11 | End: 2023-10-12

## 2023-10-11 RX ORDER — ACETAMINOPHEN 650 MG/1
650 SUPPOSITORY RECTAL EVERY 4 HOURS PRN
Status: DISCONTINUED | OUTPATIENT
Start: 2023-10-11 | End: 2023-10-15 | Stop reason: HOSPADM

## 2023-10-11 RX ORDER — TAMSULOSIN HYDROCHLORIDE 0.4 MG/1
0.4 CAPSULE ORAL DAILY
Status: DISCONTINUED | OUTPATIENT
Start: 2023-10-11 | End: 2023-10-15 | Stop reason: HOSPADM

## 2023-10-11 RX ORDER — MAGNESIUM HYDROXIDE/ALUMINUM HYDROXICE/SIMETHICONE 120; 1200; 1200 MG/30ML; MG/30ML; MG/30ML
30 SUSPENSION ORAL EVERY 4 HOURS PRN
Status: DISCONTINUED | OUTPATIENT
Start: 2023-10-11 | End: 2023-10-15 | Stop reason: HOSPADM

## 2023-10-11 RX ORDER — MYCOPHENOLATE MOFETIL 500 MG/1
1500 TABLET ORAL 2 TIMES DAILY
Status: DISCONTINUED | OUTPATIENT
Start: 2023-10-11 | End: 2023-10-13

## 2023-10-11 RX ORDER — PREDNISOLONE ACETATE 10 MG/ML
1 SUSPENSION/ DROPS OPHTHALMIC 2 TIMES DAILY
Status: DISCONTINUED | OUTPATIENT
Start: 2023-10-11 | End: 2023-10-12

## 2023-10-11 RX ADMIN — MYCOPHENOLATE MOFETIL 1500 MG: 500 TABLET, FILM COATED ORAL at 20:56

## 2023-10-11 RX ADMIN — SODIUM CHLORIDE 1000 ML: 9 INJECTION, SOLUTION INTRAVENOUS at 11:25

## 2023-10-11 RX ADMIN — INSULIN HUMAN 6 UNITS: 100 INJECTION, SUSPENSION SUBCUTANEOUS at 22:07

## 2023-10-11 RX ADMIN — SODIUM BICARBONATE: 84 INJECTION, SOLUTION INTRAVENOUS at 13:17

## 2023-10-11 ASSESSMENT — ACTIVITIES OF DAILY LIVING (ADL)
ADLS_ACUITY_SCORE: 35
ADLS_ACUITY_SCORE: 33
ADLS_ACUITY_SCORE: 35

## 2023-10-11 NOTE — ED PROVIDER NOTES
ED Provider Note  [facility]      History     Chief Complaint   Patient presents with    Hypotension     HPI  Lucian Oliveira is a 67 year old male PMH of diabetes, HTN, CKD, HLD, CHF, s/p heart transplant, sepsis, atrial flutter who presents to the ER for evaluation of hypotension.  Patient states that he has been having diarrhea for the past 3 weeks.  He has become increasingly weak and lightheaded as a result of this.  He also notes headache.  Patient states that he does not know of any precipitating factors or foods that started this.  No known sick contacts.  He denies any fevers or abdominal pain.  He has been taking all of his transplant medications.  No change in dosages.  No missed doses.  No other symptoms noted.    Past Medical History  Past Medical History:   Diagnosis Date    CAD (coronary artery disease)     CHF (congestive heart failure) (H)     CKD (chronic kidney disease), stage III (H)     Cortical cataract of both eyes     Diabetes (H)     Hyperlipidemia     Hypertension     Infection due to Streptococcus mitis group 09/23/2020    Ischemic cardiomyopathy     Obesity     CHANTEL (obstructive sleep apnea)     occas cpap    CHANTEL (obstructive sleep apnea)- severe (AHI 30)     Osteoarthritis      Past Surgical History:   Procedure Laterality Date    CATARACT IOL, RT/LT      COLONOSCOPY N/A 8/7/2019    Procedure: COLONOSCOPY, WITH POLYPECTOMY AND BIOPSY;  Surgeon: Chauncey Morataya MD;  Location:  GI    COLONOSCOPY N/A 5/12/2023    Procedure: Colonoscopy;  Surgeon: Mariano Velasquez MD;  Location:  GI    CV ANGIOGRAM CORONARY GRAFT N/A 7/2/2020    Procedure: Angiogram Coronary Graft;  Surgeon: Alex Lantigua MD;  Location: [facility] CARDIAC CATH LAB    CV CORONARY ANGIOGRAM N/A 7/2/2020    Procedure: CV CORONARY ANGIOGRAM;  Surgeon: Alex Lantigua MD;  Location: [facility] CARDIAC CATH LAB    CV CORONARY ANGIOGRAM N/A 7/20/2021    Procedure: CV  CORONARY ANGIOGRAM;  Surgeon: Raimundo Hudson MD;  [facility]    CV CORONARY ANGIOGRAM N/A 9/12/2022    Procedure: Coronary Angiogram- BIPLANE;  Surgeon: Raimundo Hudson MD;  [facility]    CV CORONARY ANGIOGRAM N/A 7/17/2023    Procedure: Coronary Angiogram;  Surgeon: Alex Lantigua MD;  [facility]    CV HEART BIOPSY N/A 7/27/2020    Procedure: Heart Biopsy;  Surgeon: Mario Burr MD;  [facility]    CV HEART BIOPSY N/A 8/3/2020    Procedure: CV HEART BIOPSY;  Surgeon: Alex Lantigua MD;  [facility]    CV HEART BIOPSY N/A 8/10/2020    Procedure: CV HEART BIOPSY;  Surgeon: Mario Burr MD;  [facility]    CV HEART BIOPSY N/A 8/17/2020    Procedure: CV HEART BIOPSY;  Surgeon: Raimundo Hudson MD;  [facility]    CV HEART BIOPSY N/A 8/31/2020    Procedure: CV HEART BIOPSY;  Surgeon: Moises Santos MD;  [facility]    CV HEART BIOPSY N/A 9/14/2020    Procedure: CV HEART BIOPSY;  Surgeon: Raimundo Hudson MD;  [facility]    CV HEART BIOPSY N/A 9/28/2020    Procedure: CV HEART BIOPSY;  Surgeon: Raimundo Hudson MD;  [facility]    CV HEART BIOPSY N/A 10/12/2020    Procedure: CV HEART BIOPSY;  Surgeon: John Stuart MD;  [facility]    CV HEART BIOPSY N/A 11/10/2020    Procedure: CV HEART BIOPSY;  Surgeon: John Stuart MD;  [facility]    CV HEART BIOPSY N/A 12/7/2020    Procedure: CV HEART BIOPSY;  Surgeon: Raimundo Hudson MD;  [facility]    CV HEART BIOPSY N/A 1/5/2021    Procedure: CV HEART BIOPSY;  Surgeon: Raimundo Hudson MD;  Location:  HEART CARDIAC CATH LAB    CV  HEART BIOPSY N/A 7/20/2021    Procedure: CV HEART BIOPSY;  Surgeon: Raimundo Hudson MD;  [facility]    CV HEART BIOPSY N/A 9/28/2021    Procedure: CV HEART BIOPSY;  Surgeon: Raimundo Hudson MD;  [facility]    CV HEART BIOPSY N/A 9/12/2022    Procedure: Heart Biopsy;  Surgeon: Raimundo Hudson MD;  [facility]    CV HEART BIOPSY N/A 7/17/2023    Procedure: Heart Biopsy;  Surgeon: Alex Lantigua MD;  Location:  HEART CARDIAC CATH LAB    CV INTRA AORTIC BALLOON N/A 7/14/2020    Procedure: RHC WITH LEAVE IN [place]/IABP;  Surgeon: Sonny Saleh MD;  Location:  HEART CARDIAC CATH LAB    CV INTRAVASULAR ULTRASOUND N/A 9/12/2022    Procedure: Intravascular Ultrasound;  Surgeon: Raimundo Hudson MD;  Location:  HEART CARDIAC CATH LAB    CV RIGHT HEART CATH MEASUREMENTS RECORDED N/A 3/25/2019    Procedure: CV RIGHT HEART CATH;  Surgeon: Moises Santos MD;  Location:  HEART CARDIAC CATH LAB    CV RIGHT HEART CATH MEASUREMENTS RECORDED N/A 7/10/2019    Procedure: CV RIGHT HEART CATH;  Surgeon: Jak Mccabe MD;  Location:  HEART CARDIAC CATH LAB    CV RIGHT HEART CATH MEASUREMENTS RECORDED N/A 7/8/2020    Procedure: Right Heart Cath with Leave In [place] already has ICU load;  Surgeon: Jak Mccabe MD;  Location:  HEART CARDIAC CATH LAB    CV RIGHT HEART CATH MEASUREMENTS RECORDED N/A 7/14/2020    Procedure: CV RIGHT HEART CATH;  Surgeon: Sonny Saleh MD;  Location:  HEART CARDIAC CATH LAB    CV RIGHT HEART CATH MEASUREMENTS RECORDED N/A 7/2/2020    Procedure: CV RIGHT HEART CATH;  Surgeon: Alex Lantigua MD;  Location:  HEART CARDIAC CATH LAB    CV RIGHT HEART CATH MEASUREMENTS RECORDED N/A 7/27/2020    Procedure: Right Heart Cath;  Surgeon: Mario Burr MD;  Location:  HEART CARDIAC CATH LAB    CV RIGHT HEART CATH MEASUREMENTS RECORDED  N/A 8/3/2020    Procedure: Right Heart Cath;  Surgeon: Alex Lantigua MD;  [facility]    CV RIGHT HEART CATH MEASUREMENTS RECORDED N/A 8/10/2020    Procedure: CV RIGHT HEART CATH;  Surgeon: Mario Burr MD;  [facility]    CV RIGHT HEART CATH MEASUREMENTS RECORDED N/A 8/17/2020    Procedure: CV RIGHT HEART CATH;  Surgeon: Raimundo Hudson MD;  Location:  HEART CARDIAC CATH LAB    CV RIGHT HEART CATH MEASUREMENTS RECORDED N/A 8/31/2020    Procedure: CV RIGHT HEART CATH;  Surgeon: Moises Santos MD;  Location:  HEART CARDIAC CATH LAB    CV RIGHT HEART CATH MEASUREMENTS RECORDED N/A 9/14/2020    Procedure: CV RIGHT HEART CATH;  Surgeon: Raimundo Hudson MD;  Location:  HEART CARDIAC CATH LAB    CV RIGHT HEART CATH MEASUREMENTS RECORDED N/A 9/28/2020    Procedure: CV RIGHT HEART CATH;  Surgeon: Raimundo Hudson MD;  [facility]    CV RIGHT HEART CATH MEASUREMENTS RECORDED N/A 10/12/2020    Procedure: CV RIGHT HEART CATH;  Surgeon: John Stuart MD;  Location:  HEART CARDIAC CATH LAB    CV RIGHT HEART CATH MEASUREMENTS RECORDED N/A 11/10/2020    Procedure: CV RIGHT HEART CATH;  Surgeon: John Stuart MD;  Location:  HEART CARDIAC CATH LAB    CV RIGHT HEART CATH MEASUREMENTS RECORDED N/A 12/7/2020    Procedure: CV RIGHT HEART CATH;  Surgeon: Raimundo Hudson MD;  Location:  HEART CARDIAC CATH LAB    CV RIGHT HEART CATH MEASUREMENTS RECORDED N/A 1/5/2021    Procedure: CV RIGHT HEART CATH;  Surgeon: Raimundo Hudson MD;  [facility]    CV RIGHT HEART CATH MEASUREMENTS RECORDED N/A 7/20/2021    Procedure: CV RIGHT HEART CATH;  Surgeon: Raimundo Hudson MD;  Location:  HEART CARDIAC CATH LAB    CV RIGHT HEART CATH MEASUREMENTS RECORDED N/A 9/28/2021    Procedure: CV RIGHT HEART CATH;  Surgeon: Raimundo Hudson  MD Day[place];  Location:  HEART CARDIAC CATH LAB    CV RIGHT HEART CATH MEASUREMENTS RECORDED N/A 9/12/2022    Procedure: Right Heart Catheterization;  Surgeon: Raimundo Hudson MD;  Location:  HEART CARDIAC CATH LAB    CV RIGHT HEART CATH MEASUREMENTS RECORDED N/A 7/17/2023    Procedure: Right Heart Catheterization;  Surgeon: Alex Lantigua MD;  Location:  HEART CARDIAC CATH LAB    EXTRACTION(S) DENTAL Left 7/13/2020    Procedure: EXTRACTION, TOOTH #11, 12, 13, 15, and 29;  Surgeon: Monica Chao DDS;  [facility]    IMPLANT AUTOMATIC IMPLANTABLE CARDIOVERTER DEFIBRILLATOR      INCISION AND DRAINAGE STERNUM W/ IRRIGATION SYSTEM, COMBINED N/A 9/23/2020    Procedure: INCISION AND DRAINAGE, LEFT SUPRACLAVICULAR WOUND INFECTION AND ABDOMENN WOUND.;  Surgeon: Ran Huertas MD;  [facility]    INSERT INTRAAORTIC BALLOON PUMP N/A 7/16/2020    Procedure: Subclavian Intra-Aortic Balloon Pump Placement;  Surgeon: Allan Sparrow MD;  [facility]    IRRIGATION AND DEBRIDEMENT CHEST WASHOUT, COMBINED N/A 9/28/2020    Procedure: IRRIGATION AND DEBRIDEMENT OF LEFT UPPER CHEST WOUND.;  Surgeon: Ran Huertas MD;  [facility]    PHACOEMULSIFICATION CLEAR CORNEA WITH STANDARD INTRAOCULAR LENS IMPLANT Right 2/6/2023    Procedure: RIGHT EYE PHACOEMULSIFICATION, CATARACT, WITH INTRAOCULAR LENS IMPLANT with trypan blue;  Surgeon: Triny Bunch MD;  [facility]: [facility]    PHACOEMULSIFICATION CLEAR CORNEA WITH STANDARD IOL, VITRECTOMY PARSPLANA 25 GAGUE, COMBINED Left 12/10/2019    Procedure: PHACOEMULSIFICATION, CATARACT, CLEAR CORNEAL INCISION APPROACH, W STD INTRAOCULAR LENS IMPLANT INSERT + VITRECTOMY BY PARS PLANA  USING 25-GAUGE INSTRUMENTS. ENDOLASER, INFUSION OF 20% SF6 GAS;  Surgeon: Triny Bunch MD;  [facility]    PICC DOUBLE LUMEN PLACEMENT Left 07/28/2020    5Fr - 42cm, Basilic vein, mid SVC    PICC INSERTION Right 07/11/2020    basilic 44 cm total      TRANSPLANT HEART RECIPIENT N/A 7/19/2020    Procedure: REDO MEDIAN STERNOTOMY, TRANSPLANT, ORTHOTOPIC HEART, RECIPIENT, ON PUMP OXYGENATOR, REMOVAL OF CARDIAC DEFIBRILLATOR AND LEAD;  Surgeon: Griselli, Massimo, MD;  [facility]    VITRECTOMY, PARS PLANA APPROACH, USING 27-GAUGE INSTRUMENTS Left 12/21/2020    Procedure: 27 gauge pars plana vitectomy, membrane peel, perfluoroctane liquid (PFO), retinectomy, endolaser, Silicone Oil 1000 cs;  Surgeon: Triny Bunch MD;  Location:  OR    [facility] CABG, ARTERY-VEIN, THREE  02/2008     acetaminophen (TYLENOL) 325 MG tablet  Alcohol Swabs PADS  aspirin (ASA) 81 MG chewable tablet  bisacodyl (DULCOLAX) 5 MG EC tablet  blood glucose (NO BRAND SPECIFIED) test strip  blood glucose monitoring (ACCU-CHEK FELIPE SMARTVIEW) meter device kit  blood glucose monitoring (SOFTCLIX) lancets  Calcium Carb-Cholecalciferol (CALCIUM CARBONATE-VITAMIN D3) 600-400 MG-UNIT TABS  Dulaglutide (TRULICITY) 4.5 MG/0.5ML SOPN  empagliflozin (JARDIANCE) 10 MG TABS tablet  ferrous sulfate (FEROSUL) 325 (65 Fe) MG tablet  furosemide (LASIX) 20 MG tablet  HUMALOG KWIKPEN 100 UNIT/ML soln  HUMALOG KWIKPEN 100 UNIT/ML soln  insulin NPH (HUMULIN N KWIKPEN) 100 UNIT/ML injection  insulin  UNIT/ML injection  insulin pen needle (32G X 4 MM) 32G X 4 MM miscellaneous  ketorolac (ACULAR) 0.5 % ophthalmic solution  lisinopril (ZESTRIL) 10 MG tablet  magnesium oxide 400 MG tablet  mycophenolate (GENERIC EQUIVALENT) 500 MG tablet  ofloxacin (OCUFLOX) 0.3 % ophthalmic solution  omega-3 acid ethyl esters (LOVAZA) 1 g capsule  order for DME  polyethylene glycol (GOLYTELY) 236 g suspension  prednisoLONE acetate (PRED FORTE) 1 % ophthalmic suspension  rosuvastatin (CRESTOR) 20 MG tablet  senna-docusate (SENOKOT-S/PERICOLACE) 8.6-50 MG tablet  sirolimus (GENERIC EQUIVALENT) 0.5 MG tablet  sulfamethoxazole-trimethoprim (BACTRIM) 400-80 MG tablet  tamsulosin (FLOMAX) 0.4 MG capsule      Allergies    Allergen Reactions    Heparin Heparin Induced Thrombocytopenia     HIT screen sent 7/18/2020. CORRINE confirmed positive     Family History  Family History   Problem Relation Age of Onset    Diabetes Sister     Diabetes Sister     Diabetes Brother     Macular Degeneration No family hx of     Glaucoma No family hx of     Myocardial Infarction No family hx of     Kidney Disease No family hx of     Anesthesia Reaction No family hx of     Bleeding Disorder No family hx of     Venous thrombosis No family hx of      Social History   Social History     Tobacco Use    Smoking status: Never    Smokeless tobacco: Never    Tobacco comments:     Never smoked; non-smoking household   Vaping Use    Vaping Use: Never used   Substance Use Topics    Alcohol use: No     Alcohol/week: 0.0 standard drinks of alcohol    Drug use: No         A complete review of systems was performed with pertinent positives and negatives noted in the HPI, and all other systems negative.    Physical Exam   BP: (!) 87/55  Pulse: 102  Temp: 97.6  F (36.4  C)  Resp: 20  SpO2: 100 %  Physical Exam  Vitals and nursing note reviewed.   Constitutional:       General: He is not in acute distress.     Appearance: He is well-developed. He is not diaphoretic.   HENT:      Head: Normocephalic and atraumatic.      Mouth/Throat:      Pharynx: No oropharyngeal exudate.   Eyes:      General: No scleral icterus.        Right eye: No discharge.         Left eye: No discharge.      Pupils: Pupils are equal, round, and reactive to light.   Cardiovascular:      Rate and Rhythm: Regular rhythm. Tachycardia present.      Heart sounds: Normal heart sounds. No murmur heard.     No friction rub. No gallop.   Pulmonary:      Effort: Pulmonary effort is normal. No respiratory distress.      Breath sounds: Normal breath sounds. No wheezing.   Chest:      Chest wall: No tenderness.   Abdominal:      General: Bowel sounds are normal. There is no distension.      Palpations: Abdomen is  soft.      Tenderness: There is no abdominal tenderness.   Musculoskeletal:         General: No tenderness or deformity. Normal range of motion.      Cervical back: Normal range of motion and neck supple.   Skin:     General: Skin is warm and dry.      Coloration: Skin is not pale.      Findings: No erythema or rash.   Neurological:      Mental Status: He is alert and oriented to person, place, and time.      Cranial Nerves: No cranial nerve deficit.           ED Course, Procedures, & Data      Procedures                      No results found for any visits on 10/11/23.  Medications   sodium chloride 0.9% BOLUS 1,000 mL (has no administration in time range)     Labs Ordered and Resulted from Time of ED Arrival to Time of ED Departure - No data to display  No orders to display          Critical Care time was 30 minutes for this patient excluding procedures.     Assessment & Plan    Is a 67-year-old male with a history of heart transplant who presents with hypotension and generalized weakness.  This is in the setting of 3 weeks of frequent diarrhea.  Patient was found to have increase in his creatinine on outpatient labs yesterday.  Patient has chronic kidney disease was noted to have increasing creatinine and decreased CO2 yesterday and patient was advised to go to the Emergency department for repeat labs.  Here, patient was noted to be slightly tachycardic and blood pressure was 87/55.  He notes generalized weakness and fatigue.  Lab work shows creatinine 5.77 with a CO2 of 9.  Patient was given IV fluids.  I discussed the case with Nephrology who recommends bicarb drip.  I discussed the case with Cardiology who will admit to their service.  They recommend obtaining CMV as well.  C. difficile is positive patient was given p.o. vancomycin. We will admit for further monitoring work-up and treatment.     I have reviewed the nursing notes. I have reviewed the findings, diagnosis, plan and need for follow up with the  patient.    New Prescriptions    No medications on file       Final diagnoses:   None       Hemal Jennings DO  [facility] EMERGENCY DEPARTMENT  10/11/2023     Hemal Jennings DO  10/11/23 [id number]

## 2023-10-11 NOTE — CONSULTS
Nephrology Initial Consult  October 11, 2023      Lucian Oliveira MRN:[id number] YOB: 1955  Date of Admission:10/11/2023  Primary care provider: No Ref-Primary, Physician  Requesting physician: Arvin Sheridan MD    ASSESSMENT AND RECOMMENDATIONS:   Mr. Lucian Oliveira is a 67 year old male with a history of DMII, HTN, HLD, CAD s/p CABG with ICM now s/p OHT (2020), CKDIII who presented to the ED with ~3 weeks of worsening diarrhea, dizziness, and headache. Nephrology was consulted for DAYA on CKD.     #DAYA  Baseline Scr appears to be ~1.2-1.7 prior to transplant. Fluctuated between 1.7 and 2.5 over the last year. Elevated to 5.77 on admission. Cannot comment on etiology without UA but most likely prerenal 2/2 dehydration in setting of diarrhea. C diff toxin negative. Renal US 6/13 normal renal architecture with no hydronephrosis. Patient with AUOP less concerning for obstructive etiology.  -UA  -check cystatin C  -agree with bladder scan    #CKD, stage 3b  See above. Long standing chronic kidney disease. Seen by nephrology OP 10/10/2023 in clinic at request of patient's cardiologist Dr. Sheridan. On chronic immunosuppressants with known renal toxicity. Was previously taking tacrolimus but stopped and switched to mycophenolate and rapamycin in 7/2023 due to worsening renal function.   Renal US 6/13 normal. Has DMII not well controlled. Last A1C (7/17/23) 8.7.  Did  have proteinuria on previous urinalysis although  improved on most recent. Recs as above.     #HAGMA  #NAGMA  Serum bicarb 9 on admission. Anion gap 22. Lactate within normal limits. HAGMA likely 2/2 to chronic kidney disease. Also likely concurrent normal anion gap metabolic acidosis given diarrhea and history of lisinopril use. OK to give IV fluids with bicarb.   -agree with serum and urine ketones  -venous blood gas  - MPA level     #Hypotension  Blood pressure 87/55 on admission improved with IV fluids. Blood pressure at  home in the 120-130s. On lisinopril and furosemide for chronic hypertension.   -agree with holding lisinopril and furosemide until resolution of diarrhea    #Volume Status  Appears hypovolemic on exam. No edema. Admission wt 74.8 kg down from reported baseline of ~79 kg.   -agree with holding lasix  -would switch to mIVF with LR at 75 mL/hr.       Recommendations were communicated to primary team via note.    Seen and discussed with Dr. Jeremiah Longo, MS4  Division of Renal Disease and Hypertension      REASON FOR CONSULT: Hx of heart transplant (2020) and CKDIII, 3 weeks of diarrhea now with DAYA on CKD    HISTORY OF PRESENT ILLNESS:  Admitting provider and nursing notes reviewed.    Mr. Lucian Oliveira is a 67 year old male with a history of DMII, HTN, HLD, CAD s/p CABG with ICM now s/p OHT (2020), CKDIII who presented to the ED with ~3 weeks of worsening diarrhea, dizziness, and headache.     Diarrhea starting about 3 weeks prior worsening over the last week. About 4-5 episodes per day. No blood in stool. Denies recent travel, illness or sick contacts. No fevers, nausea or vomiting. Frequent abdominal cramps. Has had a smaller appetite but still drinking fluids (gatorade). Making urine. No dysuria or hematuria. No NSAIDs. Was having headaches the last 2 days with bouts of dizziness. No falls. Takes blood pressure at home which he states have been in his normal range of 120-130s. Takes lisinopril and lasix for HTN.     History of heart transplant in 2020 now taking sirolimus, mycophenolate, and rapamycin. No issues with compliance. Previously taking tacrolimus but switched to new regimen last July given worsening renal function. History of DMII taking dulaglutide, empagliflozin, and insulin. Patient reports compliance although chart reviewed suggests there is an insurance coverage issue with the dulaglutide.     PAST MEDICAL HISTORY:  Reviewed with patient on 10/11/2023     Past Medical History:   Diagnosis  Date    CAD (coronary artery disease)     CHF (congestive heart failure) (H)     CKD (chronic kidney disease), stage III (H)     Cortical cataract of both eyes     Diabetes (H)     Hyperlipidemia     Hypertension     Infection due to Streptococcus mitis group 09/23/2020    Ischemic cardiomyopathy     Obesity     CHANTEL (obstructive sleep apnea)     occas cpap    CHANTEL (obstructive sleep apnea)- severe (AHI 30)     Osteoarthritis        Past Surgical History:   Procedure Laterality Date    CATARACT IOL, RT/LT      COLONOSCOPY N/A 8/7/2019    Procedure: COLONOSCOPY, WITH POLYPECTOMY AND BIOPSY;  Surgeon: Chauncey Morataya MD;  Location:  GI    COLONOSCOPY N/A 5/12/2023    Procedure: Colonoscopy;  Surgeon: Mariano Velasquez MD;  Location:  GI    CV ANGIOGRAM CORONARY GRAFT N/A 7/2/2020    Procedure: Angiogram Coronary Graft;  Surgeon: Alex Lantigua MD;  Location:  HEART CARDIAC CATH LAB    CV CORONARY ANGIOGRAM N/A 7/2/2020    Procedure: CV CORONARY ANGIOGRAM;  Surgeon: Alex Lantigua MD;  Location:  HEART CARDIAC CATH LAB    CV CORONARY ANGIOGRAM N/A 7/20/2021    Procedure: CV CORONARY ANGIOGRAM;  Surgeon: Raimundo Hudson MD;  Location:  HEART CARDIAC CATH LAB    CV CORONARY ANGIOGRAM N/A 9/12/2022    Procedure: Coronary Angiogram- BIPLANE;  Surgeon: Raimundo Hudson MD;  Location:  HEART CARDIAC CATH LAB    CV CORONARY ANGIOGRAM N/A 7/17/2023    Procedure: Coronary Angiogram;  Surgeon: Alex Lantigua MD;  Location:  HEART CARDIAC CATH LAB    CV HEART BIOPSY N/A 7/27/2020    Procedure: Heart Biopsy;  Surgeon: Mario Burr MD;  Location:  HEART CARDIAC CATH LAB    CV HEART BIOPSY N/A 8/3/2020    Procedure: CV HEART BIOPSY;  Surgeon: Alex Lantigua MD;  Location:  HEART CARDIAC CATH LAB    CV HEART BIOPSY N/A 8/10/2020    Procedure: CV HEART BIOPSY;  Surgeon: Mario Burr MD;  Location:  HEART CARDIAC CATH LAB    CV  HEART BIOPSY N/A 8/17/2020    Procedure: CV HEART BIOPSY;  Surgeon: Raimundo Hudson MD;  Location: [facility]    CV HEART BIOPSY N/A 8/31/2020    Procedure: CV HEART BIOPSY;  Surgeon: Moises Santos MD;  Location: [facility]    CV HEART BIOPSY N/A 9/14/2020    Procedure: CV HEART BIOPSY;  Surgeon: Raimundo Hudson MD;  Location: [facility]    CV HEART BIOPSY N/A 9/28/2020    Procedure: CV HEART BIOPSY;  Surgeon: Raimundo Hudson MD;  Location: [facility]    CV HEART BIOPSY N/A 10/12/2020    Procedure: CV HEART BIOPSY;  Surgeon: John Stuart MD;  Location:  HEART CARDIAC CATH LAB    CV HEART BIOPSY N/A 11/10/2020    Procedure: CV HEART BIOPSY;  Surgeon: John Stuart MD;  Location: [facility]    CV HEART BIOPSY N/A 12/7/2020    Procedure: CV HEART BIOPSY;  Surgeon: Raimundo Hudson MD;  Location:  HEART CARDIAC CATH LAB    CV HEART BIOPSY N/A 1/5/2021    Procedure: CV HEART BIOPSY;  Surgeon: Raimundo Hudson MD;  Location:  HEART CARDIAC CATH LAB    CV HEART BIOPSY N/A 7/20/2021    Procedure: CV HEART BIOPSY;  Surgeon: Ramiundo Hudson MD;  Location:  HEART CARDIAC CATH LAB    CV HEART BIOPSY N/A 9/28/2021    Procedure: CV HEART BIOPSY;  Surgeon: Raimundo Hudson MD;  Location:  HEART CARDIAC CATH LAB    CV HEART BIOPSY N/A 9/12/2022    Procedure: Heart Biopsy;  Surgeon: Raimundo Hudson MD;  Location:  HEART CARDIAC CATH LAB    CV HEART BIOPSY N/A 7/17/2023    Procedure: Heart Biopsy;  Surgeon: Alex Lantigua MD;  Location:  HEART CARDIAC CATH LAB    CV INTRA AORTIC BALLOON N/A 7/14/2020    Procedure: RHC WITH LEAVE IN SWAN/IABP;  Surgeon: Sonny Saleh MD;  Location:  HEART CARDIAC CATH LAB    CV INTRAVASULAR ULTRASOUND N/A 9/12/2022    Procedure: Intravascular Ultrasound;  Surgeon: Tristan  aRimundo Padgett MD;  Location:  HEART CARDIAC CATH LAB    CV RIGHT HEART CATH MEASUREMENTS RECORDED N/A 3/25/2019    Procedure: CV RIGHT HEART CATH;  Surgeon: Moises Santos MD;  Location:  HEART CARDIAC CATH LAB    CV RIGHT HEART CATH MEASUREMENTS RECORDED N/A 7/10/2019    Procedure: CV RIGHT HEART CATH;  Surgeon: Jak Mccabe MD;  Location:  HEART CARDIAC CATH LAB    CV RIGHT HEART CATH MEASUREMENTS RECORDED N/A 7/8/2020    Procedure: Right Heart Cath with Leave In [place] already has ICU load;  Surgeon: Jak Mccabe MD;  Location:  HEART CARDIAC CATH LAB    CV RIGHT HEART CATH MEASUREMENTS RECORDED N/A 7/14/2020    Procedure: CV RIGHT HEART CATH;  Surgeon: Sonny Saleh MD;  Location:  HEART CARDIAC CATH LAB    CV RIGHT HEART CATH MEASUREMENTS RECORDED N/A 7/2/2020    Procedure: CV RIGHT HEART CATH;  Surgeon: Alex Lantigua MD;  Location:  HEART CARDIAC CATH LAB    CV RIGHT HEART CATH MEASUREMENTS RECORDED N/A 7/27/2020    Procedure: Right Heart Cath;  Surgeon: Mario Burr MD;  Location:  HEART CARDIAC CATH LAB    CV RIGHT HEART CATH MEASUREMENTS RECORDED N/A 8/3/2020    Procedure: Right Heart Cath;  Surgeon: Alex Lantgiua MD;  Location:  HEART CARDIAC CATH LAB    CV RIGHT HEART CATH MEASUREMENTS RECORDED N/A 8/10/2020    Procedure: CV RIGHT HEART CATH;  Surgeon: Mario Burr MD;  Location:  HEART CARDIAC CATH LAB    CV RIGHT HEART CATH MEASUREMENTS RECORDED N/A 8/17/2020    Procedure: CV RIGHT HEART CATH;  Surgeon: Raimundo Hudson MD;  Location:  HEART CARDIAC CATH LAB    CV RIGHT HEART CATH MEASUREMENTS RECORDED N/A 8/31/2020    Procedure: CV RIGHT HEART CATH;  Surgeon: Moises Santos MD;  Location:  HEART CARDIAC CATH LAB    CV RIGHT HEART CATH MEASUREMENTS RECORDED N/A 9/14/2020    Procedure: CV RIGHT HEART CATH;  Surgeon: Raimundo Hudson MD;  Location:  HEART CARDIAC CATH LAB    CV RIGHT HEART CATH  MEASUREMENTS RECORDED N/A 9/28/2020    Procedure: CV RIGHT HEART CATH;  Surgeon: Raimundo Hudson MD;  Location:  HEART CARDIAC CATH LAB    CV RIGHT HEART CATH MEASUREMENTS RECORDED N/A 10/12/2020    Procedure: CV RIGHT HEART CATH;  Surgeon: John Stuart MD;  Location:  HEART CARDIAC CATH LAB    CV RIGHT HEART CATH MEASUREMENTS RECORDED N/A 11/10/2020    Procedure: CV RIGHT HEART CATH;  Surgeon: John Stuart MD;  Location:  HEART CARDIAC CATH LAB    CV RIGHT HEART CATH MEASUREMENTS RECORDED N/A 12/7/2020    Procedure: CV RIGHT HEART CATH;  Surgeon: Raimundo Hudson MD;  Location:  HEART CARDIAC CATH LAB    CV RIGHT HEART CATH MEASUREMENTS RECORDED N/A 1/5/2021    Procedure: CV RIGHT HEART CATH;  Surgeon: Raimundo Hudson MD;  Location:  HEART CARDIAC CATH LAB    CV RIGHT HEART CATH MEASUREMENTS RECORDED N/A 7/20/2021    Procedure: CV RIGHT HEART CATH;  Surgeon: Raimundo Hudson MD;  Location:  HEART CARDIAC CATH LAB    CV RIGHT HEART CATH MEASUREMENTS RECORDED N/A 9/28/2021    Procedure: CV RIGHT HEART CATH;  Surgeon: Raimundo Hudson MD;  Location:  HEART CARDIAC CATH LAB    CV RIGHT HEART CATH MEASUREMENTS RECORDED N/A 9/12/2022    Procedure: Right Heart Catheterization;  Surgeon: Raimundo Hudson MD;  Location:  HEART CARDIAC CATH LAB    CV RIGHT HEART CATH MEASUREMENTS RECORDED N/A 7/17/2023    Procedure: Right Heart Catheterization;  Surgeon: Alex Lantigua MD;  Location:  HEART CARDIAC CATH LAB    EXTRACTION(S) DENTAL Left 7/13/2020    Procedure: EXTRACTION, TOOTH #11, 12, 13, 15, and 29;  Surgeon: Monica Chao DDS;  Location:  OR    IMPLANT AUTOMATIC IMPLANTABLE CARDIOVERTER DEFIBRILLATOR      INCISION AND DRAINAGE STERNUM W/ IRRIGATION SYSTEM, COMBINED N/A 9/23/2020    Procedure: INCISION AND DRAINAGE, LEFT SUPRACLAVICULAR WOUND INFECTION AND ABDOMENN WOUND.;  Surgeon: Ran Huertas,  MD;  [facility]    INSERT INTRAAORTIC BALLOON PUMP N/A 7/16/2020    Procedure: Subclavian Intra-Aortic Balloon Pump Placement;  Surgeon: Allan Sparrow MD;  [facility]    IRRIGATION AND DEBRIDEMENT CHEST WASHOUT, COMBINED N/A 9/28/2020    Procedure: IRRIGATION AND DEBRIDEMENT OF LEFT UPPER CHEST WOUND.;  Surgeon: Ran Huertas MD;  [facility]    PHACOEMULSIFICATION CLEAR CORNEA WITH STANDARD INTRAOCULAR LENS IMPLANT Right 2/6/2023    Procedure: RIGHT EYE PHACOEMULSIFICATION, CATARACT, WITH INTRAOCULAR LENS IMPLANT with trypan blue;  Surgeon: Triny Bunch MD;  Location: [facility]    PHACOEMULSIFICATION CLEAR CORNEA WITH STANDARD IOL, VITRECTOMY PARSPLANA 25 GAGUE, COMBINED Left 12/10/2019    Procedure: PHACOEMULSIFICATION, CATARACT, CLEAR CORNEAL INCISION APPROACH, W STD INTRAOCULAR LENS IMPLANT INSERT + VITRECTOMY BY PARS PLANA  USING 25-GAUGE INSTRUMENTS. ENDOLASER, INFUSION OF 20% SF6 GAS;  Surgeon: Triny Bunch MD;  [facility]    PICC DOUBLE LUMEN PLACEMENT Left 07/28/2020    5Fr - 42cm, Basilic vein, mid SVC    PICC INSERTION Right 07/11/2020    basilic 44 cm total     TRANSPLANT HEART RECIPIENT N/A 7/19/2020    Procedure: REDO MEDIAN STERNOTOMY, TRANSPLANT, ORTHOTOPIC HEART, RECIPIENT, ON PUMP OXYGENATOR, REMOVAL OF CARDIAC DEFIBRILLATOR AND LEAD;  Surgeon: Griselli, Massimo, MD;  [facility]    VITRECTOMY, PARS PLANA APPROACH, USING 27-GAUGE INSTRUMENTS Left 12/21/2020    Procedure: 27 gauge pars plana vitectomy, membrane peel, perfluoroctane liquid (PFO), retinectomy, endolaser, Silicone Oil 1000 cs;  Surgeon: Triny Bunch MD;  [facility]: [facility]    ZC CABG, ARTERY-VEIN, THREE  02/2008        MEDICATIONS:  PTA Meds  Prior to Admission medications    Medication Sig Last Dose Taking? Auth Provider Long Term End Date   acetaminophen (TYLENOL) 325 MG tablet Take 3 tablets (975 mg) by mouth every 8 hours as needed for mild pain   Arvin Sheridan MD      Alcohol Swabs PADS Use to swab the area of the injection or sergio as directed   Per insurance coverage   Donald Rand PA-C     aspirin (ASA) 81 MG chewable tablet Take 1 tablet (81 mg) by mouth daily  Patient taking differently: Take 81 mg by mouth every morning   Ameya Agustin PA     bisacodyl (DULCOLAX) 5 MG EC tablet 2 days prior to procedure, take 2 tablets at 4 pm. 1 day prior to procedure, take 2 tablets at 4 pm. For additional instructions refer to your colonoscopy prep instructions.  Patient not taking: Reported on 10/9/2023   Mariano Velasquez MD     blood glucose (NO BRAND SPECIFIED) test strip Use to test blood sugar 4 times daily or as directed.   Jacque Smith PA-C     blood glucose monitoring (ACCU-CHEK FELIPE SMARTVIEW) meter device kit Use to test blood sugar 3-4 times daily, as directed.   Anna Archuleta PA-C     blood glucose monitoring (SOFTCLIX) lancets 1 each 4 times daily Use to test blood sugars 2 times daily.   Marisabel Prieto PA-C Yes    Calcium Carb-Cholecalciferol (CALCIUM CARBONATE-VITAMIN D3) 600-400 MG-UNIT TABS TAKE ONE TABLET BY MOUTH TWICE A DAY WITH MEALS   Arvin Sheridan MD     Dulaglutide (TRULICITY) 4.5 MG/0.5ML SOPN Inject 4.5 mg Subcutaneous once a week   Marisabel Prieto PA-C Yes    empagliflozin (JARDIANCE) 10 MG TABS tablet Take 1 tablet (10 mg) by mouth daily   Arvin Sheridan MD     ferrous sulfate (FEROSUL) 325 (65 Fe) MG tablet Take 1 tablet (325 mg) by mouth daily (with breakfast)   Arvin Sheridan MD     furosemide (LASIX) 20 MG tablet Take 1 tablet (20 mg) by mouth daily   Arvin Sheridan MD Yes    HUMALOG KWIKPEN 100 UNIT/ML soln Inject 5-20 Units Subcutaneous 3 times daily (before meals) Max daily dose 60 units.   Claudia Byrne MD Yes    HUMALOG KWIKPEN 100 UNIT/ML soln INJECT 8 TO 14 UNITS UNDER THE SKIN THREE TIMES DAILY BEFORE A MEAL PER SLIDING SCALE AS DIRECTED   Marisabel Prieto  MISTY COOPER Yes    insulin NPH (HUMULIN N KWIKPEN) 100 UNIT/ML injection GIve 40 units each morning and 12 at night subcutaneous   Claudia Byrne MD Yes    insulin  UNIT/ML injection GIve 40 units each morning and 12-16  units each evening BEFORE meals  Patient taking differently: Inject 40 Units Subcutaneous 2 times daily GIve 40 units each morning and 12 at night   Marisabel Prieto PA-C Yes    insulin pen needle (32G X 4 MM) 32G X 4 MM miscellaneous Use 5-6 pen needles daily or as directed.   Marisabel Prieto PA-C No    ketorolac (ACULAR) 0.5 % ophthalmic solution Place 1 drop into the right eye 2 times daily   Vince Cat MD     lisinopril (ZESTRIL) 10 MG tablet TAKE ONE TABLET BY MOUTH TWICE A DAY   Arvin Sheridan MD Yes    magnesium oxide 400 MG tablet Take 1 tablet (400 mg) by mouth 2 times daily   Arvin Sheridan MD     mycophenolate (GENERIC EQUIVALENT) 500 MG tablet Take 3 tablets (1,500 mg) by mouth 2 times daily   Arvin Sheridan MD Yes    ofloxacin (OCUFLOX) 0.3 % ophthalmic solution Place 1 drop into the right eye 4 times daily   Mathew Cordoba MD     omega-3 acid ethyl esters (LOVAZA) 1 g capsule Take 1 capsule (1 g) by mouth daily   Marisabel Prieto PA-C Yes    order for DME Auto CPAP 8-15 cmH20   Kai Valdez MD     polyethylene glycol (GOLYTELY) 236 g suspension 2 days prior at 5pm, mix and drink half of a jug of Golytely. Drink an 8 oz. glass of Golytely every 15 minutes until half of the jug is gone. Place remainder of Golytely in the refrigerator. 1 day prior at 5 pm, drink the 2nd half of a jug of Golytely bowel prep. 6 hours before your check-in time, drink an 8 oz. glass of Golytely every 15 minutes until half of the 2nd jug of Golytely is gone. Discard remainder of second jug.   Mariano Velasquez MD     prednisoLONE acetate (PRED FORTE) 1 % ophthalmic suspension Place 1 drop into the right eye 2 times daily   Vince Cat MD      rosuvastatin (CRESTOR) 20 MG tablet Take 1 tablet (20 mg) by mouth daily   Arvin Sheridan MD Yes    senna-docusate (SENOKOT-S/PERICOLACE) 8.6-50 MG tablet Take 1 tablet by mouth 2 times daily as needed (hold for loose stools)  Patient not taking: Reported on 10/9/2023   Arvin Sheridan MD     sirolimus (GENERIC EQUIVALENT) 0.5 MG tablet Take 6 tablets (3 mg) by mouth daily   Arvin Sheridan MD     sulfamethoxazole-trimethoprim (BACTRIM) 400-80 MG tablet Take 1 tablet by mouth three times a week In the AM   Arvin Sheridan MD     tamsulosin (FLOMAX) 0.4 MG capsule TAKE ONE CAPSULE BY MOUTH ONCE DAILY   Arvin Sheridan MD        Current Meds   bevacizumab  1.25 mg Intravitreal Q28 Days    bevacizumab  1.25 mg Intravitreal Q28 Days    bevacizumab  1.25 mg Intravitreal Q28 Days    bevacizumab  1.25 mg Intravitreal Q28 Days    insulin aspart  1-7 Units Subcutaneous TID AC    insulin aspart  1-5 Units Subcutaneous At Bedtime    [START ON 10/12/2023] insulin NPH  20 Units Subcutaneous QAM AC    insulin NPH  6 Units Subcutaneous At Bedtime    ketorolac  1 drop Right Eye BID    [Held by provider] lisinopril  10 mg Oral BID    mycophenolate  1,500 mg Oral BID    ofloxacin  1 drop Right Eye 4x Daily    prednisoLONE acetate  1 drop Right Eye BID    rosuvastatin  20 mg Oral Daily    sirolimus  3 mg Oral Daily    [Held by provider] sulfamethoxazole-trimethoprim  1 tablet Oral Once per day on Tuesday Thursday Saturday    [Held by provider] tamsulosin  0.4 mg Oral Daily     Infusion Meds   - MEDICATION INSTRUCTIONS -      sodium bicarbonate 75 mEq in D5W 1,075 mL infusion 75 mL/hr at 10/11/23 1527       ALLERGIES:    Allergies   Allergen Reactions    Heparin Heparin Induced Thrombocytopenia     HIT screen sent 7/18/2020. CORRINE confirmed positive       REVIEW OF SYSTEMS:  A comprehensive of systems was negative except as noted above.    SOCIAL HISTORY:   Social History     Socioeconomic History     Marital status:      Spouse name: Not on file    Number of children: 4    Years of education: Not on file    Highest education level: Not on file   Occupational History    Occupation:      Employer: [employer]     Employer: RETIRED   Tobacco Use    Smoking status: Never    Smokeless tobacco: Never    Tobacco comments:     Never smoked; non-smoking household   Vaping Use    Vaping Use: Never used   Substance and Sexual Activity    Alcohol use: No     Alcohol/week: 0.0 standard drinks of alcohol    Drug use: No    Sexual activity: Yes     Partners: Female   Other Topics Concern    Parent/sibling w/ CABG, MI or angioplasty before 65F 55M? No   Social History Narrative    Not on file     Social Determinants of Health     Financial Resource Strain: Not on file   Food Insecurity: Not on file   Transportation Needs: Not on file   Physical Activity: Not on file   Stress: Not on file   Social Connections: Not on file   Interpersonal Safety: Not on file   Housing Stability: Not on file     Reviewed with patient   Patient's wife accompanies Lucian Oliveira in hospital room    FAMILY MEDICAL HISTORY:   Family History   Problem Relation Age of Onset    Diabetes Sister     Diabetes Sister     Diabetes Brother     Macular Degeneration No family hx of     Glaucoma No family hx of     Myocardial Infarction No family hx of     Kidney Disease No family hx of     Anesthesia Reaction No family hx of     Bleeding Disorder No family hx of     Venous thrombosis No family hx of      No Family history of kidney disease. Family hx of diabetes.   Reviewed with patient    PHYSICAL EXAM:   Temp  Av.6  F (36.4  C)  Min: 97.6  F (36.4  C)  Max: 97.6  F (36.4  C)      Pulse  Av  Min: 96  Max: 102 Resp  Av  Min: 20  Max: 20  SpO2  Av %  Min: 100 %  Max: 100 %       BP (!) 87/55   Pulse 102   Temp 97.6  F (36.4  C) (Oral)   Resp 20   SpO2 100%           GENERAL APPEARANCE: In no acute distress,  awake sitting up in bed. Pleasant and conversational.   EYES: No scleral icterus, pupils equal  Pulmonary: lungs clear to auscultation with equal breath sounds bilaterally, no clubbing  CV: regular rhythm, normal rate, no rub   - JVD none   - Edema none present in bilateral LE  GI: soft, nontender, normal bowel sounds  MS: no evidence of inflammation in joints, no muscle tenderness  : no villalobos  SKIN: no rash, warm, dry, no cyanosis  NEURO: face symmetric. A&O x3. No deficits grossly.  Access: PIV R forearm    LABS:   I have reviewed the following labs:  CMP  Recent Labs   Lab 10/11/23  1128 10/10/23  1011   * 132*   POTASSIUM 4.1 4.2   CHLORIDE 99 103   CO2 9* 12*   ANIONGAP 22* 17*   * 214*   BUN 84.8* 73.7*   CR 5.77* 4.97*   GFRESTIMATED 10* 12*   BRYANNA 8.6* 8.9   PHOS  --  4.8*   PROTTOTAL 7.0  --    ALBUMIN 4.0 3.8   BILITOTAL 0.2  --    ALKPHOS 124  --    AST 22  --    ALT 20  --      CBC  Recent Labs   Lab 10/11/23  1128 10/10/23  1011   HGB 10.1* 10.0*   WBC 5.2 5.8   RBC 3.69* 3.66*   HCT 30.7* 30.9*   MCV 83 84   MCH 27.4 27.3   MCHC 32.9 32.4   RDW 14.8 14.5    197     INRNo lab results found in last 7 days.  ABGNo lab results found in last 7 days.   URINE STUDIES  Recent Labs   Lab Test 07/28/23  1226 09/28/21  0926 09/25/20  1241 09/22/20  1448   COLOR Light Yellow Light Yellow Yellow Yellow   APPEARANCE Clear Clear Clear Clear   URINEGLC 100* 50* 70* 150*   URINEBILI Negative Negative Negative Negative   URINEKETONE Negative Negative Negative Negative   SG 1.014 1.016 1.018 1.017   UBLD Negative Negative Negative Negative   URINEPH 5.5 6.5 5.5 5.0   PROTEIN Negative 10* 10* 10*   NITRITE Negative Negative Negative Negative   LEUKEST Negative Negative Negative Negative   RBCU <1 0 0 <1   WBCU <1 0 0 1     Recent Labs   Lab Test 03/14/22  1408 10/03/18  0725 07/26/16  1006   [facility] 0.29* 0.06 0.18     PTH  Recent Labs   Lab Test 05/03/23  0844 08/16/18  0834 07/26/16  0953   PTHI 40  180* 110*     IRON STUDIES  Recent Labs   Lab Test 10/10/23  1011 07/28/23  1205 07/17/23  0908 09/22/20  1256 08/24/20 2026 07/08/19  1021   IRON 90 80 56* 66 88 49   * 227* 229* 272 249 247   IRONSAT 38 35 24 24 35 20   SEVERIANO 236 190 123 447* 388 156       IMAGING:  All imaging studies reviewed by me.     Curt Longo    I have seen and examined this patient with the medical student and reviewed the labs and vital signs. I have edited this note.  This note reflects our joint assessment and plan.     66 yo M s/P OHT who presents to the hospital with 3 week history of diarrhea and now has DAYA on CKD. UA in July was bland so unlikely to be diabetic nephropathy.    #CKD Likely CNI toxicity plus ASVD.   # DAYA most likely from volume depletion  # Acidosis: elevated gap from CKD.  Ketones not checked bu may contribute. CEI and diarrhea likely contributing to low bicarb    Recs above    Jeimy Daniels MD

## 2023-10-11 NOTE — H&P
Cardiology 2    History and Physical    Date of Admission:  10/11/2023  Date of Service (when I saw the patient): 10/11/23    Assessment & Plan   Lucian Oliveira is a 67 year old male with a PMHX significant for DMII, HLD, CKD statge 3, HTN, RUE DVT c/b HIT, and HFrEF 2/2 ICM s/p OHT 7/19/2020 who presents to the ED with 3x weeks of diarrhea.     #High Anion Gap Metabolic Acidosis  #Diarrhea  HCO3 9 on admission, AG 22, VBG 7.12. Patient presented with 3x weeks of diarrhea, found to be C.Diff positive by PCR and antigen, negative toxin suggests chronic colonization. Possible patient has colitis vs. Gasteroenteritis. No melena or hematochezia. Lactate normal. Low suspicion for rejection. Will also evaluate for DKA given uncontrolled diabetes, unlikely to be HHS. Will evaluate for Euglycemic DKA given new start of Jardiance (7/20/2023). Denies any excessive use of ASA.   - s/p vancomycin 125mg in ED; will discontinue given C. Diff toxin negative.   - s/p 1L NS 75mL   - Continue with 75mL/hr NaHCO3 in D5W  - Stool culture pending  - CMV pending  - Repeat BMP Q8H  - Serum ketones and urine Ketones   - Follow blood cultures   - Covid PCR  - CT Abdomen/Pelvis with Oral contrast to eval for colitis.     #DAYA on CKD stage III, improving  Baseline Cr 1.5-1.7. On admission, Cr 5.77. BUN/Cr 14.55. Likely pre-renal given hx of diarrhea. Also possible to be d/t sirolimus toxicity; will check trough in AM.  Denies any dysuria or hematuria. Patient still making urine.   - Fluid resuscitation as above   - UA with reflex to culture  - Nephrology consulted   - Sirolimus level AM   - Urinary retention bladder scan     #Chronic systolic HF 2/2 ICM now s/p OHT 7/19/2020  #Primary graft dysfunction, resolved  Postoperative course c/b primary graft dysfunction on POD1, EF 20-25% and severe RV dysfunction felt secondary to prolonged ischemic time. Graft function returned to normal on POD 6.     Primary Cardiologist: Dr. Sheridan    Graft Function:   --Volume: PTA lasixs 20mg every day; will hold given c/f hypovolemia    - Admission weight: 165  --BP: on admission 87/55; repeat BP improved after fluids  --HR: 102 (at baseline HR)     Immunosuppression:   - Prednisone: none   --Continue Mycophenolate 1500 mg BID  --Continue Sirolimus 3mg every day    - Sirolimus level add on for 10/12 AM     Serostatus: CMV: D+/R+, EBV: D+/R+, Toxo: D+/R-     Prophylaxis:   --CAV: ASA 81, continue rosuvastatin 20 mg daily   - Cardiac Cath on 7/17/2023: no angiographic CAD, similar to prior   --Thrush: None    --PCP: Bactrim 400-80mg, M/W/F; Hold given diarrhea  --GI: Protonix   --Osteoporosis: calcium/vitamin D   --CMV: None   --Toxo: Bactrim lifelong given D+      Routine Surveillance:   - Last RHC with biopsy: 7/17/2023; elevated R and L sided filling pressures with mild PH and persevered CO (RA 14mmHg, RV 38/14 mmHg, PCWP 22mmHg, Jacy CO/CI 6.4/3.4, PVR 1.24 pendleton,  dynes)   - Biopsy   - ECHO: 7/17/2023; normal LVF with EF 55-60%, no regional wall motion abnormalities.      #Poorly controlled T2DM on insulin   Recent A1C 8.7. Now insulin dependent and basal-bolus insulin.  Home regimen Trulicity 4.5 mg subcutaneous once a week, Humulin NPH 40 units in am and 12 units in pm, Humalog 12-14 with meal, Jardiance 10 mg daily.   - A1C  - Hypoglycemic protocol  - Basal NPH 20U qAM on 6U qPM; Medium sliding scale insulin   - Hold Jardiance 10mg every day   - Hold Trulicity 4.5mg SubQ injection Qweek     #Vitreous hemorrhage, R eye (4/2021)   #Proliferative Diabetic Retinopathy, left eye   #Ocular HTN, bilateral eyes   Last evaluated by ophthalmology on 7/27/2023.   - On Bevacizumab last dose on 7/27/2023  - Continue latanoprost at bedtime both eyes   - Continue  prednisolone 1x daily left eye  - Continue Ketorolac 1x daily right eye     #Hypertension   - Hold PTA lisinopril 10mg every day, given     #Chronic anemia   #Iron deficency anemia  - Hold ferrous 325mg  supplementation given acute infection   - Daily CBC    # Hyponatremia,  Na 132 on admission. Likely hypovolemic hyponatremia due to extra-renal losses given diarrhea.   - BMP        #RIJ and RUE DVT, provoked   #+HIT  US 7/22/20 nonocclusive thrombus in the right internal jugular vein along the intravenous catheter, nonocclusive thrombus along the right PICC line in the right axillary vein demonstrated, and occlusive thrombus in the superficial right cephalic vein demonstrated as above. +HIT and started on fondaparinux. Treated with Fondaparinux for 3 months and was followed by [facility] as an outpatient.        #Urinary retention   - Hold PTA tamsulosin given DAYA       #Donor Streptococcus salivarius bacteremia  Post-transplant was treated with ceftriaxone.        FEN: General diet  DVT Prophylaxis: Low Risk/Ambulatory with no VTE prophylaxis indicated  Code Status: Full Code  Disposition: Expected discharge in 2-3 days days once DAYA has resolved.    Pt seen and discussed with the staff attending Dr. Sheridan, who agrees with the assessment and plan.      Demetra Moran MD  Internal Medicine PGY1      Primary Care Physician   Physician No Ref-Primary    Chief Complaint   Abnormal labs    History is obtained from the patient and electronic health record    History of Present Illness   Lucian Oliveira is a 67 year old male who presents with abnormal labs. He has a pmhx of HF 2/2 ICM s/p OHT (7/2020), T2DM, HTN, CKD, h/o HIT, anemia, retinal detachment s/p vitrectomy (12/2020). He presented to the ED on 10/11/23 with abnormal labs. He is admitted to [place] [address] for DAYA on CKD.     Patient was seen by nephrology in clinic and given elevated Cr and low bicarb was sent to the ED for further evaluation. Patient states he has had 5x loose stools per day for the past three weeks. He has had no nausea/vomiting. No hematochezia or melena. No fever or arthralgia. Denies any SOB, leg swelling, or chest pain. He has been taking  his medications as prescribed including his immunosuppression and his bactrim. Of note he started Jardiance recently in July. No other new medication changes. Denies any changes to appetite or recent weight loss.     The patient follows with Dr. Sheridan as outpatient, last seen 7/17/23. He has a hx of ICM with CABG that he received a OHT for in 7/2020. Post operative course was complicated by primary graft dysfunction (LVEF 20-25%, RV dysfunction). This resolved on serial echos.     Past Medical History    I have reviewed this patient's medical history and updated it with pertinent information if needed.   Past Medical History:   Diagnosis Date    CAD (coronary artery disease)     CHF (congestive heart failure) (H)     CKD (chronic kidney disease), stage III (H)     Cortical cataract of both eyes     Diabetes (H)     Hyperlipidemia     Hypertension     Infection due to Streptococcus mitis group 09/23/2020    Ischemic cardiomyopathy     Obesity     CHANTEL (obstructive sleep apnea)     occas cpap    CHANTEL (obstructive sleep apnea)- severe (AHI 30)     Osteoarthritis        Past Surgical History   I have reviewed this patient's surgical history and updated it with pertinent information if needed.  Past Surgical History:   Procedure Laterality Date    CATARACT IOL, RT/LT      COLONOSCOPY N/A 8/7/2019    Procedure: COLONOSCOPY, WITH POLYPECTOMY AND BIOPSY;  Surgeon: Chauncey Morataya MD;  [facility]    COLONOSCOPY N/A 5/12/2023    Procedure: Colonoscopy;  Surgeon: Mariano Velasquez MD;  [facility]    CV ANGIOGRAM CORONARY GRAFT N/A 7/2/2020    Procedure: Angiogram Coronary Graft;  Surgeon: Alex Lantigua MD;  Location:  HEART CARDIAC CATH LAB    CV CORONARY ANGIOGRAM N/A 7/2/2020    Procedure: CV CORONARY ANGIOGRAM;  Surgeon: Alex Lantigua MD;  Location: [facility] CARDIAC CATH LAB    CV CORONARY ANGIOGRAM N/A 7/20/2021    Procedure: CV CORONARY ANGIOGRAM;  Surgeon: Raimundo Hudson  MD [place];  Location:  HEART CARDIAC CATH LAB    CV CORONARY ANGIOGRAM N/A 9/12/2022    Procedure: Coronary Angiogram- BIPLANE;  Surgeon: Raimundo Hudson MD;  Location:  HEART CARDIAC CATH LAB    CV CORONARY ANGIOGRAM N/A 7/17/2023    Procedure: Coronary Angiogram;  Surgeon: Alex Lantigua MD;  [facility]    CV HEART BIOPSY N/A 7/27/2020    Procedure: Heart Biopsy;  Surgeon: Mario Burr MD;  [facility]    CV HEART BIOPSY N/A 8/3/2020    Procedure: CV HEART BIOPSY;  Surgeon: Alex Lantigua MD;  Location:  HEART CARDIAC CATH LAB    CV HEART BIOPSY N/A 8/10/2020    Procedure: CV HEART BIOPSY;  Surgeon: Mario Burr MD;  [facility]    CV HEART BIOPSY N/A 8/17/2020    Procedure: CV HEART BIOPSY;  Surgeon: Raimundo Hudson MD;  [facility]    CV HEART BIOPSY N/A 8/31/2020    Procedure: CV HEART BIOPSY;  Surgeon: Moises Santos MD;  Location:  HEART CARDIAC CATH LAB    CV HEART BIOPSY N/A 9/14/2020    Procedure: CV HEART BIOPSY;  Surgeon: Raimundo Hudson MD;  Location:  HEART CARDIAC CATH LAB    CV HEART BIOPSY N/A 9/28/2020    Procedure: CV HEART BIOPSY;  Surgeon: Raimundo Hudson MD;  [facility]    CV HEART BIOPSY N/A 10/12/2020    Procedure: CV HEART BIOPSY;  Surgeon: John Stuart MD;  [facility]    CV HEART BIOPSY N/A 11/10/2020    Procedure: CV HEART BIOPSY;  Surgeon: John Stuart MD;  [facility]    CV HEART BIOPSY N/A 12/7/2020    Procedure: CV HEART BIOPSY;  Surgeon: Raimundo Hudson MD;  Location:  HEART CARDIAC CATH LAB    CV HEART BIOPSY N/A 1/5/2021    Procedure: CV HEART BIOPSY;  Surgeon: Raimundo Hudson MD;  Location:  HEART CARDIAC CATH LAB    CV HEART BIOPSY N/A 7/20/2021    Procedure: CV HEART  BIOPSY;  Surgeon: Raimundo Hudson MD;  [facility]    CV HEART BIOPSY N/A 9/28/2021    Procedure: CV HEART BIOPSY;  Surgeon: Raimundo Hudson MD;  [facility]    CV HEART BIOPSY N/A 9/12/2022    Procedure: Heart Biopsy;  Surgeon: Raimundo Hudson MD;  [facility]    CV HEART BIOPSY N/A 7/17/2023    Procedure: Heart Biopsy;  Surgeon: Alex Lantigua MD;  Location:  HEART CARDIAC CATH LAB    CV INTRA AORTIC BALLOON N/A 7/14/2020    Procedure: RHC WITH LEAVE IN AN/IABP;  Surgeon: Sonny Saleh MD;  Location:  HEART CARDIAC CATH LAB    CV INTRAVASULAR ULTRASOUND N/A 9/12/2022    Procedure: Intravascular Ultrasound;  Surgeon: Raimundo Hudson MD;  Location:  HEART CARDIAC CATH LAB    CV RIGHT HEART CATH MEASUREMENTS RECORDED N/A 3/25/2019    Procedure: CV RIGHT HEART CATH;  Surgeon: Moises Santos MD;  Location:  HEART CARDIAC CATH LAB    CV RIGHT HEART CATH MEASUREMENTS RECORDED N/A 7/10/2019    Procedure: CV RIGHT HEART CATH;  Surgeon: Jak Mccabe MD;  Location:  HEART CARDIAC CATH LAB    CV RIGHT HEART CATH MEASUREMENTS RECORDED N/A 7/8/2020    Procedure: Right Heart Cath with Leave In [place] already has ICU load;  Surgeon: Jak Mccabe MD;  Location:  HEART CARDIAC CATH LAB    CV RIGHT HEART CATH MEASUREMENTS RECORDED N/A 7/14/2020    Procedure: CV RIGHT HEART CATH;  Surgeon: Sonny Saleh MD;  Location:  HEART CARDIAC CATH LAB    CV RIGHT HEART CATH MEASUREMENTS RECORDED N/A 7/2/2020    Procedure: CV RIGHT HEART CATH;  Surgeon: Alex Lantigua MD;  Location:  HEART CARDIAC CATH LAB    CV RIGHT HEART CATH MEASUREMENTS RECORDED N/A 7/27/2020    Procedure: Right Heart Cath;  Surgeon: Mario Burr MD;  Location:  HEART CARDIAC CATH LAB    CV RIGHT HEART CATH MEASUREMENTS RECORDED N/A 8/3/2020    Procedure: Right Heart Cath;   Surgeon: Alex Lantigua MD;  Location:  HEART CARDIAC CATH LAB    CV RIGHT HEART CATH MEASUREMENTS RECORDED N/A 8/10/2020    Procedure: CV RIGHT HEART CATH;  Surgeon: Mario Burr MD;  Location:  HEART CARDIAC CATH LAB    CV RIGHT HEART CATH MEASUREMENTS RECORDED N/A 8/17/2020    Procedure: CV RIGHT HEART CATH;  Surgeon: Raimundo Hudson MD;  Location:  HEART CARDIAC CATH LAB    CV RIGHT HEART CATH MEASUREMENTS RECORDED N/A 8/31/2020    Procedure: CV RIGHT HEART CATH;  Surgeon: Moises Santos MD;  Location:  HEART CARDIAC CATH LAB    CV RIGHT HEART CATH MEASUREMENTS RECORDED N/A 9/14/2020    Procedure: CV RIGHT HEART CATH;  Surgeon: Raimundo Hudson MD;  Location:  HEART CARDIAC CATH LAB    CV RIGHT HEART CATH MEASUREMENTS RECORDED N/A 9/28/2020    Procedure: CV RIGHT HEART CATH;  Surgeon: Raimundo Hudson MD;  Location:  HEART CARDIAC CATH LAB    CV RIGHT HEART CATH MEASUREMENTS RECORDED N/A 10/12/2020    Procedure: CV RIGHT HEART CATH;  Surgeon: John Stuart MD;  Location:  HEART CARDIAC CATH LAB    CV RIGHT HEART CATH MEASUREMENTS RECORDED N/A 11/10/2020    Procedure: CV RIGHT HEART CATH;  Surgeon: John Stuart MD;  Location:  HEART CARDIAC CATH LAB    CV RIGHT HEART CATH MEASUREMENTS RECORDED N/A 12/7/2020    Procedure: CV RIGHT HEART CATH;  Surgeon: Raimundo Hudson MD;  Location:  HEART CARDIAC CATH LAB    CV RIGHT HEART CATH MEASUREMENTS RECORDED N/A 1/5/2021    Procedure: CV RIGHT HEART CATH;  Surgeon: Raimundo Hudson MD;  Location:  HEART CARDIAC CATH LAB    CV RIGHT HEART CATH MEASUREMENTS RECORDED N/A 7/20/2021    Procedure: CV RIGHT HEART CATH;  Surgeon: Raimundo Hudson MD;  Location:  HEART CARDIAC CATH LAB    CV RIGHT HEART CATH MEASUREMENTS RECORDED N/A 9/28/2021    Procedure: CV RIGHT HEART CATH;  Surgeon: Raimundo Hudson MD;  [facility]    CV RIGHT HEART CATH MEASUREMENTS RECORDED N/A 9/12/2022    Procedure: Right Heart Catheterization;  Surgeon: Raimundo Hudson MD;  Location:  HEART CARDIAC CATH LAB    CV RIGHT HEART CATH MEASUREMENTS RECORDED N/A 7/17/2023    Procedure: Right Heart Catheterization;  Surgeon: Alex Lantigua MD;  [facility]    EXTRACTION(S) DENTAL Left 7/13/2020    Procedure: EXTRACTION, TOOTH #11, 12, 13, 15, and 29;  Surgeon: Monica Chao DDS;  [facility]    IMPLANT AUTOMATIC IMPLANTABLE CARDIOVERTER DEFIBRILLATOR      INCISION AND DRAINAGE STERNUM W/ IRRIGATION SYSTEM, COMBINED N/A 9/23/2020    Procedure: INCISION AND DRAINAGE, LEFT SUPRACLAVICULAR WOUND INFECTION AND ABDOMENN WOUND.;  Surgeon: Ran Huertas MD;  [facility]    INSERT INTRAAORTIC BALLOON PUMP N/A 7/16/2020    Procedure: Subclavian Intra-Aortic Balloon Pump Placement;  Surgeon: Allan Sparrow MD;  [facility]    IRRIGATION AND DEBRIDEMENT CHEST WASHOUT, COMBINED N/A 9/28/2020    Procedure: IRRIGATION AND DEBRIDEMENT OF LEFT UPPER CHEST WOUND.;  Surgeon: Ran Huertas MD;  [facility]    PHACOEMULSIFICATION CLEAR CORNEA WITH STANDARD INTRAOCULAR LENS IMPLANT Right 2/6/2023    Procedure: RIGHT EYE PHACOEMULSIFICATION, CATARACT, WITH INTRAOCULAR LENS IMPLANT with trypan blue;  Surgeon: Triny Bunch MD;  [facility]    PHACOEMULSIFICATION CLEAR CORNEA WITH STANDARD IOL, VITRECTOMY PARSPLANA 25 GAGUE, COMBINED Left 12/10/2019    Procedure: PHACOEMULSIFICATION, CATARACT, CLEAR CORNEAL INCISION APPROACH, W STD INTRAOCULAR LENS IMPLANT INSERT + VITRECTOMY BY PARS PLANA  USING 25-GAUGE INSTRUMENTS. ENDOLASER, INFUSION OF 20% SF6 GAS;  Surgeon: Triny Bunch MD;  [facility]    PICC DOUBLE LUMEN PLACEMENT Left 07/28/2020    5Fr - 42cm, Basilic vein, mid SVC    PICC INSERTION Right 07/11/2020    basilic 44 cm total     TRANSPLANT HEART RECIPIENT N/A 7/19/2020     Procedure: REDO MEDIAN STERNOTOMY, TRANSPLANT, ORTHOTOPIC HEART, RECIPIENT, ON PUMP OXYGENATOR, REMOVAL OF CARDIAC DEFIBRILLATOR AND LEAD;  Surgeon: Griselli, Massimo, MD;  [facility]    VITRECTOMY, PARS PLANA APPROACH, USING 27-GAUGE INSTRUMENTS Left 2020    Procedure: 27 gauge pars plana vitectomy, membrane peel, perfluoroctane liquid (PFO), retinectomy, endolaser, Silicone Oil 1000 cs;  Surgeon: Triny Bunch MD;  Location:  OR    [facility] CABG, ARTERY-VEIN, THREE  2008       Prior to Admission Medications   Prior to Admission Medications   Prescriptions Last Dose Informant Patient Reported? Taking?   Alcohol Swabs PADS   No No   Sig: Use to swab the area of the injection or sergio as directed   Per insurance coverage   Calcium Carb-Cholecalciferol (CALCIUM CARBONATE-VITAMIN D3) 600-400 MG-UNIT TABS   No No   Sig: TAKE ONE TABLET BY MOUTH TWICE A DAY WITH MEALS   Dulaglutide (TRULICITY) 4.5 MG/0.5ML SOPN   No No   Sig: Inject 4.5 mg Subcutaneous once a week   HUMALOG KWIKPEN 100 UNIT/ML soln   No No   Sig: INJECT 8 TO 14 UNITS UNDER THE SKIN THREE TIMES DAILY BEFORE A MEAL PER SLIDING SCALE AS DIRECTED   HUMALOG KWIKPEN 100 UNIT/ML soln   No No   Sig: Inject 5-20 Units Subcutaneous 3 times daily (before meals) Max daily dose 60 units.   acetaminophen (TYLENOL) 325 MG tablet   No No   Sig: Take 3 tablets (975 mg) by mouth every 8 hours as needed for mild pain   aspirin (ASA) 81 MG chewable tablet   No No   Sig: Take 1 tablet (81 mg) by mouth daily   Patient taking differently: Take 81 mg by mouth every morning   bisacodyl (DULCOLAX) 5 MG EC tablet   No No   Si days prior to procedure, take 2 tablets at 4 pm. 1 day prior to procedure, take 2 tablets at 4 pm. For additional instructions refer to your colonoscopy prep instructions.   Patient not taking: Reported on 10/9/2023   blood glucose (NO BRAND SPECIFIED) test strip   No No   Sig: Use to test blood sugar 4 times daily or as directed.    blood glucose monitoring (ACCU-CHEK FELIPE SMARTVIEW) meter device kit  Self No No   Sig: Use to test blood sugar 3-4 times daily, as directed.   blood glucose monitoring (SOFTCLIX) lancets   No No   Si each 4 times daily Use to test blood sugars 2 times daily.   empagliflozin (JARDIANCE) 10 MG TABS tablet   No No   Sig: Take 1 tablet (10 mg) by mouth daily   ferrous sulfate (FEROSUL) 325 (65 Fe) MG tablet   No No   Sig: Take 1 tablet (325 mg) by mouth daily (with breakfast)   furosemide (LASIX) 20 MG tablet   No No   Sig: Take 1 tablet (20 mg) by mouth daily   insulin NPH (HUMULIN N KWIKPEN) 100 UNIT/ML injection   No No   Sig: GIve 40 units each morning and 12 at night subcutaneous   insulin  UNIT/ML injection   No No   Sig: GIve 40 units each morning and 12-16  units each evening BEFORE meals   Patient taking differently: Inject 40 Units Subcutaneous 2 times daily GIve 40 units each morning and 12 at night   insulin pen needle (32G X 4 MM) 32G X 4 MM miscellaneous   No No   Sig: Use 5-6 pen needles daily or as directed.   ketorolac (ACULAR) 0.5 % ophthalmic solution   No No   Sig: Place 1 drop into the right eye 2 times daily   lisinopril (ZESTRIL) 10 MG tablet   No No   Sig: TAKE ONE TABLET BY MOUTH TWICE A DAY   magnesium oxide 400 MG tablet   No No   Sig: Take 1 tablet (400 mg) by mouth 2 times daily   mycophenolate (GENERIC EQUIVALENT) 500 MG tablet   No No   Sig: Take 3 tablets (1,500 mg) by mouth 2 times daily   ofloxacin (OCUFLOX) 0.3 % ophthalmic solution   No No   Sig: Place 1 drop into the right eye 4 times daily   omega-3 acid ethyl esters (LOVAZA) 1 g capsule   No No   Sig: Take 1 capsule (1 g) by mouth daily   order for DME  Self No No   Sig: Auto CPAP 8-15 cmH20   polyethylene glycol (GOLYTELY) 236 g suspension   No No   Si days prior at 5pm, mix and drink half of a jug of Golytely. Drink an 8 oz. glass of Golytely every 15 minutes until half of the jug is gone. Place remainder of  Golytely in the refrigerator. 1 day prior at 5 pm, drink the 2nd half of a jug of Golytely bowel prep. 6 hours before your check-in time, drink an 8 oz. glass of Golytely every 15 minutes until half of the 2nd jug of Golytely is gone. Discard remainder of second jug.   prednisoLONE acetate (PRED FORTE) 1 % ophthalmic suspension   No No   Sig: Place 1 drop into the right eye 2 times daily   rosuvastatin (CRESTOR) 20 MG tablet   No No   Sig: Take 1 tablet (20 mg) by mouth daily   senna-docusate (SENOKOT-S/PERICOLACE) 8.6-50 MG tablet   No No   Sig: Take 1 tablet by mouth 2 times daily as needed (hold for loose stools)   Patient not taking: Reported on 10/9/2023   sirolimus (GENERIC EQUIVALENT) 0.5 MG tablet   No No   Sig: Take 6 tablets (3 mg) by mouth daily   sulfamethoxazole-trimethoprim (BACTRIM) 400-80 MG tablet   No No   Sig: Take 1 tablet by mouth three times a week In the AM   tamsulosin (FLOMAX) 0.4 MG capsule   No No   Sig: TAKE ONE CAPSULE BY MOUTH ONCE DAILY      Facility-Administered Medications Last Administration Doses Remaining   bevacizumab (AVASTIN) intravitreal inj 1.25 mg 1/25/2023 11:53 AM    bevacizumab (AVASTIN) intravitreal inj 1.25 mg 6/2/2022  9:47 AM    bevacizumab (AVASTIN) intravitreal inj 1.25 mg None recorded    bevacizumab (AVASTIN) intravitreal inj 1.25 mg None recorded         Allergies   Allergies   Allergen Reactions    Heparin Heparin Induced Thrombocytopenia     HIT screen sent 7/18/2020. CORRINE confirmed positive       Social History   I have reviewed this patient's social history and updated it with pertinent information if needed. Lucian Huynh Tee  reports that he has never smoked. He has never used smokeless tobacco. He reports that he does not drink alcohol and does not use drugs.    Family History   I have reviewed this patient's family history and updated it with pertinent information if needed.   Family History   Problem Relation Age of Onset    Diabetes Sister      Diabetes Sister     Diabetes Brother     Macular Degeneration No family hx of     Glaucoma No family hx of     Myocardial Infarction No family hx of     Kidney Disease No family hx of     Anesthesia Reaction No family hx of     Bleeding Disorder No family hx of     Venous thrombosis No family hx of        Review of Systems   The 10 point Review of Systems is negative other than noted in the HPI or here.     Physical Exam   Temp: 97.6  F (36.4  C) Temp src: Oral BP: (!) 87/55 Pulse: 102   Resp: 20 SpO2: 100 % O2 Device: None (Room air)    Vital Signs with Ranges  Temp:  [97.6  F (36.4  C)] 97.6  F (36.4  C)  Pulse:  [102] 102  Resp:  [20] 20  BP: (87)/(55) 87/55  SpO2:  [100 %] 100 %  0 lbs 0 oz    GEN:  Alert, interactive, appears comfortable, NAD. Diaphoretic on exam.   HEENT:  Normocephalic/atraumatic, no scleral icterus, no nasal discharge, OP clear with MMM.  CV:  Regular rate and rhythm, no murmur or JVD.   LUNGS:  Clear to auscultation bilaterally, no wheezes or crackles.   ABD:  Hyperactive bowel sounds, soft, non-tender/non-distended.  No rebound/guarding/rigidity.  EXT:  No edema or cyanosis.  Hands/feet warm to touch with good signs of peripheral perfusion.   SKIN:  Dry to touch, no exanthems noted in the visualized areas.  NEURO:  Alert and oriented, no new focal deficits appreciated.  PSCYH: Normal mood and affect    Data   Data reviewed today:  I personally reviewed:    Recent Labs   Lab 10/11/23  1128 10/10/23  1011   WBC 5.2 5.8   HGB 10.1* 10.0*   MCV 83 84    197   * 132*   POTASSIUM 4.1 4.2   CHLORIDE 99 103   CO2 9* 12*   BUN 84.8* 73.7*   CR 5.77* 4.97*   ANIONGAP 22* 17*   BRYANNA 8.6* 8.9   * 214*   ALBUMIN 4.0 3.8   PROTTOTAL 7.0  --    BILITOTAL 0.2  --    ALKPHOS 124  --    ALT 20  --    AST 22  --       Note has been documented by Demetra Moran on 10/11/2023

## 2023-10-11 NOTE — PROGRESS NOTES
Nephrology Note: Patient Outreach Encounter    REASON FOR CALL:     REASON FOR CALL: Chronic Disease Management                                  SITUATION/BACKROUND:   Patient is being treated for CKD Stage 4.    Patient has been feeling uremic. Patient reported diarrhea for 3 weeks and is feeling uremic. Lab results posted.   Nephrology RNCC Assessments  Uremic Symptoms  Uremic Symptoms  Fatigue: yes  Cold: no  Nausea: yes  Vomiting: no  Diarrhea: yes  Poor appetite: yes  Tremor: no  Decreased urine output: yes  Confusion: no    Patient Journey Status:  Active    ASSESSMENT:     Patient called post lab results. Patient is feeling uremic with fatigue and diarrhea x3 weeks. Dr Shelton asked if he could get a lab redraw at the [facility] and if lab result are the same to have a bicarb infusion. Patient stated his son gives him rides but his son has to work but he will have his son take him to the closest hospital for re evaluation. Dr Shelton informed. Following patient's progress.    Neph Assessments:  Uremic Symptoms  Uremic Symptoms  Fatigue: yes  Cold: no  Nausea: yes  Vomiting: no  Diarrhea: yes  Poor appetite: yes  Tremor: no  Decreased urine output: yes  Confusion: no    PLAN:     Education:  Method:  general discussion/verbal explanation  Discussed: labs  These interventions were used: Goal: ER for uremic symptoms and Open ended questions  Education provided and patient was given an opportunity to ask questions.      Follow Up:   Follow up call in 1-2 weeks     Patient verbalized understanding and will follow up as recommended.    ALLISON HUNT RN

## 2023-10-11 NOTE — ED TRIAGE NOTES
Pt ambulatory to triage with c/o hypotension. Pt states he has had 3 weeks of diarrhea, now with increased weakness. Pt hypotensive in triage. Pt endorses weight loss, headaches, and feeling dizzy.      Triage Assessment (Adult)       Row Name 10/11/23 1100          Triage Assessment    Airway WDL WDL        Respiratory WDL    Respiratory WDL WDL        Skin Circulation/Temperature WDL    Skin Circulation/Temperature WDL X  pale        Cardiac WDL    Cardiac WDL X;rhythm     Pulse Rate & Regularity tachycardic        Peripheral/Neurovascular WDL    Peripheral Neurovascular WDL WDL        Cognitive/Neuro/Behavioral WDL    Cognitive/Neuro/Behavioral WDL WDL

## 2023-10-11 NOTE — ED NOTES
Bed: [facility]I  Expected date:   Expected time:   Means of arrival:   Comments:  Hallway appropriate

## 2023-10-12 ENCOUNTER — TELEPHONE (OUTPATIENT)
Dept: GASTROENTEROLOGY | Facility: CLINIC | Age: 68
End: 2023-10-12
Payer: COMMERCIAL

## 2023-10-12 LAB
ADV 40+41 DNA STL QL NAA+NON-PROBE: NEGATIVE
ALBUMIN UR-MCNC: 20 MG/DL
ANION GAP SERPL CALCULATED.3IONS-SCNC: 15 MMOL/L (ref 7–15)
ANION GAP SERPL CALCULATED.3IONS-SCNC: 16 MMOL/L (ref 7–15)
ANION GAP SERPL CALCULATED.3IONS-SCNC: 16 MMOL/L (ref 7–15)
ANION GAP SERPL CALCULATED.3IONS-SCNC: 18 MMOL/L (ref 7–15)
APPEARANCE UR: CLEAR
ASTRO TYP 1-8 RNA STL QL NAA+NON-PROBE: NEGATIVE
ATRIAL RATE - MUSE: 90 BPM
ATRIAL RATE - MUSE: 96 BPM
BASE EXCESS BLDV CALC-SCNC: -13.3 MMOL/L (ref -7.7–1.9)
BASE EXCESS BLDV CALC-SCNC: -13.4 MMOL/L (ref -7.7–1.9)
BASE EXCESS BLDV CALC-SCNC: -15 MMOL/L (ref -7.7–1.9)
BASO+EOS+MONOS # BLD AUTO: ABNORMAL 10*3/UL
BASO+EOS+MONOS NFR BLD AUTO: ABNORMAL %
BASOPHILS # BLD AUTO: 0 10E3/UL (ref 0–0.2)
BASOPHILS NFR BLD AUTO: 0 %
BILIRUB UR QL STRIP: NEGATIVE
BUN SERPL-MCNC: 68.8 MG/DL (ref 8–23)
BUN SERPL-MCNC: 83.9 MG/DL (ref 8–23)
BUN SERPL-MCNC: 85 MG/DL (ref 8–23)
BUN SERPL-MCNC: 85.5 MG/DL (ref 8–23)
C CAYETANENSIS DNA STL QL NAA+NON-PROBE: NEGATIVE
CALCIUM SERPL-MCNC: 6.2 MG/DL (ref 8.8–10.2)
CALCIUM SERPL-MCNC: 7.8 MG/DL (ref 8.8–10.2)
CALCIUM SERPL-MCNC: 8.1 MG/DL (ref 8.8–10.2)
CALCIUM SERPL-MCNC: 8.4 MG/DL (ref 8.8–10.2)
CAMPYLOBACTER DNA SPEC NAA+PROBE: NEGATIVE
CHLORIDE SERPL-SCNC: 100 MMOL/L (ref 98–107)
CHLORIDE SERPL-SCNC: 104 MMOL/L (ref 98–107)
CHLORIDE SERPL-SCNC: 104 MMOL/L (ref 98–107)
CHLORIDE SERPL-SCNC: 111 MMOL/L (ref 98–107)
CMV DNA SPEC NAA+PROBE-ACNC: NOT DETECTED IU/ML
COLOR UR AUTO: ABNORMAL
CREAT SERPL-MCNC: 3.33 MG/DL (ref 0.67–1.17)
CREAT SERPL-MCNC: 3.96 MG/DL (ref 0.67–1.17)
CREAT SERPL-MCNC: 4.59 MG/DL (ref 0.67–1.17)
CREAT SERPL-MCNC: 4.88 MG/DL (ref 0.67–1.17)
CRYPTOSP DNA STL QL NAA+NON-PROBE: NEGATIVE
DEPRECATED HCO3 PLAS-SCNC: 11 MMOL/L (ref 22–29)
DEPRECATED HCO3 PLAS-SCNC: 12 MMOL/L (ref 22–29)
DEPRECATED HCO3 PLAS-SCNC: 13 MMOL/L (ref 22–29)
DEPRECATED HCO3 PLAS-SCNC: 9 MMOL/L (ref 22–29)
DIASTOLIC BLOOD PRESSURE - MUSE: NORMAL MMHG
DIASTOLIC BLOOD PRESSURE - MUSE: NORMAL MMHG
E COLI O157 DNA STL QL NAA+NON-PROBE: ABNORMAL
E HISTOLYT DNA STL QL NAA+NON-PROBE: NEGATIVE
EAEC ASTA GENE ISLT QL NAA+PROBE: POSITIVE
EC STX1+STX2 GENES STL QL NAA+NON-PROBE: NEGATIVE
EGFRCR SERPLBLD CKD-EPI 2021: 12 ML/MIN/1.73M2
EGFRCR SERPLBLD CKD-EPI 2021: 13 ML/MIN/1.73M2
EGFRCR SERPLBLD CKD-EPI 2021: 16 ML/MIN/1.73M2
EGFRCR SERPLBLD CKD-EPI 2021: 19 ML/MIN/1.73M2
EOSINOPHIL # BLD AUTO: 0 10E3/UL (ref 0–0.7)
EOSINOPHIL NFR BLD AUTO: 0 %
EPEC EAE GENE STL QL NAA+NON-PROBE: NEGATIVE
ERYTHROCYTE [DISTWIDTH] IN BLOOD BY AUTOMATED COUNT: 15.2 % (ref 10–15)
ETEC LTA+ST1A+ST1B TOX ST NAA+NON-PROBE: NEGATIVE
G LAMBLIA DNA STL QL NAA+NON-PROBE: NEGATIVE
GLUCOSE BLDC GLUCOMTR-MCNC: 143 MG/DL (ref 70–99)
GLUCOSE BLDC GLUCOMTR-MCNC: 151 MG/DL (ref 70–99)
GLUCOSE BLDC GLUCOMTR-MCNC: 170 MG/DL (ref 70–99)
GLUCOSE BLDC GLUCOMTR-MCNC: 183 MG/DL (ref 70–99)
GLUCOSE SERPL-MCNC: 132 MG/DL (ref 70–99)
GLUCOSE SERPL-MCNC: 136 MG/DL (ref 70–99)
GLUCOSE SERPL-MCNC: 157 MG/DL (ref 70–99)
GLUCOSE SERPL-MCNC: 160 MG/DL (ref 70–99)
GLUCOSE UR STRIP-MCNC: NEGATIVE MG/DL
HCO3 BLDV-SCNC: 13 MMOL/L (ref 21–28)
HCT VFR BLD AUTO: 25.5 % (ref 40–53)
HGB BLD-MCNC: 8.6 G/DL (ref 13.3–17.7)
HGB UR QL STRIP: NEGATIVE
HOLD SPECIMEN: NORMAL
IMM GRANULOCYTES # BLD: 0 10E3/UL
IMM GRANULOCYTES NFR BLD: 1 %
INTERPRETATION ECG - MUSE: NORMAL
INTERPRETATION ECG - MUSE: NORMAL
KETONES UR STRIP-MCNC: NEGATIVE MG/DL
LEUKOCYTE ESTERASE UR QL STRIP: NEGATIVE
LYMPHOCYTES # BLD AUTO: 0.8 10E3/UL (ref 0.8–5.3)
LYMPHOCYTES NFR BLD AUTO: 20 %
MAGNESIUM SERPL-MCNC: 1.7 MG/DL (ref 1.7–2.3)
MCH RBC QN AUTO: 27.7 PG (ref 26.5–33)
MCHC RBC AUTO-ENTMCNC: 33.7 G/DL (ref 31.5–36.5)
MCV RBC AUTO: 82 FL (ref 78–100)
MONOCYTES # BLD AUTO: 0.3 10E3/UL (ref 0–1.3)
MONOCYTES NFR BLD AUTO: 7 %
MUCOUS THREADS #/AREA URNS LPF: PRESENT /LPF
NEUTROPHILS # BLD AUTO: 2.8 10E3/UL (ref 1.6–8.3)
NEUTROPHILS NFR BLD AUTO: 72 %
NITRATE UR QL: NEGATIVE
NOROVIRUS GI+II RNA STL QL NAA+NON-PROBE: POSITIVE
NRBC # BLD AUTO: 0 10E3/UL
NRBC BLD AUTO-RTO: 0 /100
O+P STL MICRO: NEGATIVE
O2/TOTAL GAS SETTING VFR VENT: 21 %
O2/TOTAL GAS SETTING VFR VENT: 99 %
O2/TOTAL GAS SETTING VFR VENT: ABNORMAL %
P AXIS - MUSE: 62 DEGREES
P AXIS - MUSE: 82 DEGREES
P SHIGELLOIDES DNA STL QL NAA+NON-PROBE: NEGATIVE
PCO2 BLDV: 31 MM HG (ref 40–50)
PCO2 BLDV: 33 MM HG (ref 40–50)
PCO2 BLDV: 35 MM HG (ref 40–50)
PH BLDV: 7.16 [PH] (ref 7.32–7.43)
PH BLDV: 7.22 [PH] (ref 7.32–7.43)
PH BLDV: 7.24 [PH] (ref 7.32–7.43)
PH UR STRIP: 5 [PH] (ref 5–7)
PHOSPHATE SERPL-MCNC: 3.8 MG/DL (ref 2.5–4.5)
PLATELET # BLD AUTO: 130 10E3/UL (ref 150–450)
PO2 BLDV: 23 MM HG (ref 25–47)
PO2 BLDV: 27 MM HG (ref 25–47)
PO2 BLDV: 30 MM HG (ref 25–47)
POTASSIUM SERPL-SCNC: 2.9 MMOL/L (ref 3.4–5.3)
POTASSIUM SERPL-SCNC: 3.6 MMOL/L (ref 3.4–5.3)
POTASSIUM SERPL-SCNC: 3.8 MMOL/L (ref 3.4–5.3)
POTASSIUM SERPL-SCNC: 4.4 MMOL/L (ref 3.4–5.3)
PR INTERVAL - MUSE: 122 MS
PR INTERVAL - MUSE: 148 MS
QRS DURATION - MUSE: 100 MS
QRS DURATION - MUSE: 94 MS
QT - MUSE: 350 MS
QT - MUSE: 374 MS
QTC - MUSE: 442 MS
QTC - MUSE: 457 MS
R AXIS - MUSE: -11 DEGREES
R AXIS - MUSE: -14 DEGREES
RBC # BLD AUTO: 3.11 10E6/UL (ref 4.4–5.9)
RBC URINE: 0 /HPF
RVA RNA STL QL NAA+NON-PROBE: NEGATIVE
SALMONELLA SP RPOD STL QL NAA+PROBE: NEGATIVE
SAPO I+II+IV+V RNA STL QL NAA+NON-PROBE: NEGATIVE
SHIGELLA SP+EIEC IPAH ST NAA+NON-PROBE: NEGATIVE
SIROLIMUS BLD-MCNC: 5.1 UG/L (ref 5–15)
SODIUM SERPL-SCNC: 130 MMOL/L (ref 135–145)
SODIUM SERPL-SCNC: 131 MMOL/L (ref 135–145)
SODIUM SERPL-SCNC: 133 MMOL/L (ref 135–145)
SODIUM SERPL-SCNC: 135 MMOL/L (ref 135–145)
SP GR UR STRIP: 1.01 (ref 1–1.03)
SQUAMOUS EPITHELIAL: <1 /HPF
SYSTOLIC BLOOD PRESSURE - MUSE: NORMAL MMHG
SYSTOLIC BLOOD PRESSURE - MUSE: NORMAL MMHG
T AXIS - MUSE: 35 DEGREES
T AXIS - MUSE: 53 DEGREES
TME LAST DOSE: NORMAL H
TME LAST DOSE: NORMAL H
UROBILINOGEN UR STRIP-MCNC: NORMAL MG/DL
V CHOLERAE DNA SPEC QL NAA+PROBE: NEGATIVE
VENTRICULAR RATE- MUSE: 90 BPM
VENTRICULAR RATE- MUSE: 96 BPM
VIBRIO DNA SPEC NAA+PROBE: NEGATIVE
WBC # BLD AUTO: 3.9 10E3/UL (ref 4–11)
WBC URINE: 3 /HPF
Y ENTEROCOL DNA STL QL NAA+PROBE: NEGATIVE

## 2023-10-12 PROCEDURE — 80048 BASIC METABOLIC PNL TOTAL CA: CPT

## 2023-10-12 PROCEDURE — 214N000001 HC R&B CCU UMMC

## 2023-10-12 PROCEDURE — 250N000012 HC RX MED GY IP 250 OP 636 PS 637

## 2023-10-12 PROCEDURE — 84100 ASSAY OF PHOSPHORUS: CPT | Performed by: STUDENT IN AN ORGANIZED HEALTH CARE EDUCATION/TRAINING PROGRAM

## 2023-10-12 PROCEDURE — 80048 BASIC METABOLIC PNL TOTAL CA: CPT | Performed by: STUDENT IN AN ORGANIZED HEALTH CARE EDUCATION/TRAINING PROGRAM

## 2023-10-12 PROCEDURE — 250N000013 HC RX MED GY IP 250 OP 250 PS 637: Performed by: INTERNAL MEDICINE

## 2023-10-12 PROCEDURE — 258N000003 HC RX IP 258 OP 636: Performed by: STUDENT IN AN ORGANIZED HEALTH CARE EDUCATION/TRAINING PROGRAM

## 2023-10-12 PROCEDURE — 36415 COLL VENOUS BLD VENIPUNCTURE: CPT | Performed by: STUDENT IN AN ORGANIZED HEALTH CARE EDUCATION/TRAINING PROGRAM

## 2023-10-12 PROCEDURE — 250N000009 HC RX 250

## 2023-10-12 PROCEDURE — 258N000003 HC RX IP 258 OP 636: Performed by: INTERNAL MEDICINE

## 2023-10-12 PROCEDURE — 258N000003 HC RX IP 258 OP 636

## 2023-10-12 PROCEDURE — 36415 COLL VENOUS BLD VENIPUNCTURE: CPT

## 2023-10-12 PROCEDURE — 82784 ASSAY IGA/IGD/IGG/IGM EACH: CPT | Performed by: STUDENT IN AN ORGANIZED HEALTH CARE EDUCATION/TRAINING PROGRAM

## 2023-10-12 PROCEDURE — 83735 ASSAY OF MAGNESIUM: CPT

## 2023-10-12 PROCEDURE — 250N000009 HC RX 250: Performed by: STUDENT IN AN ORGANIZED HEALTH CARE EDUCATION/TRAINING PROGRAM

## 2023-10-12 PROCEDURE — 99233 SBSQ HOSP IP/OBS HIGH 50: CPT | Performed by: INTERNAL MEDICINE

## 2023-10-12 PROCEDURE — 80195 ASSAY OF SIROLIMUS: CPT

## 2023-10-12 PROCEDURE — 82803 BLOOD GASES ANY COMBINATION: CPT | Performed by: STUDENT IN AN ORGANIZED HEALTH CARE EDUCATION/TRAINING PROGRAM

## 2023-10-12 PROCEDURE — 82803 BLOOD GASES ANY COMBINATION: CPT

## 2023-10-12 PROCEDURE — 99222 1ST HOSP IP/OBS MODERATE 55: CPT | Performed by: INTERNAL MEDICINE

## 2023-10-12 PROCEDURE — 82962 GLUCOSE BLOOD TEST: CPT

## 2023-10-12 PROCEDURE — 250N000009 HC RX 250: Performed by: INTERNAL MEDICINE

## 2023-10-12 PROCEDURE — 87799 DETECT AGENT NOS DNA QUANT: CPT | Performed by: STUDENT IN AN ORGANIZED HEALTH CARE EDUCATION/TRAINING PROGRAM

## 2023-10-12 PROCEDURE — 250N000013 HC RX MED GY IP 250 OP 250 PS 637: Performed by: STUDENT IN AN ORGANIZED HEALTH CARE EDUCATION/TRAINING PROGRAM

## 2023-10-12 PROCEDURE — 250N000011 HC RX IP 250 OP 636: Mod: JZ | Performed by: INTERNAL MEDICINE

## 2023-10-12 PROCEDURE — 85025 COMPLETE CBC W/AUTO DIFF WBC: CPT

## 2023-10-12 PROCEDURE — 81001 URINALYSIS AUTO W/SCOPE: CPT | Performed by: EMERGENCY MEDICINE

## 2023-10-12 PROCEDURE — 99233 SBSQ HOSP IP/OBS HIGH 50: CPT | Mod: GC | Performed by: INTERNAL MEDICINE

## 2023-10-12 RX ORDER — AZITHROMYCIN 250 MG/1
500 TABLET, FILM COATED ORAL DAILY
Status: COMPLETED | OUTPATIENT
Start: 2023-10-12 | End: 2023-10-14

## 2023-10-12 RX ORDER — BISACODYL 5 MG/1
TABLET, DELAYED RELEASE ORAL
Status: ON HOLD | COMMUNITY
End: 2023-10-15

## 2023-10-12 RX ORDER — LATANOPROST 50 UG/ML
1 SOLUTION/ DROPS OPHTHALMIC AT BEDTIME
Status: DISCONTINUED | OUTPATIENT
Start: 2023-10-12 | End: 2023-10-15 | Stop reason: HOSPADM

## 2023-10-12 RX ORDER — LATANOPROST 50 UG/ML
1 SOLUTION/ DROPS OPHTHALMIC AT BEDTIME
COMMUNITY
End: 2024-01-04

## 2023-10-12 RX ORDER — PREDNISOLONE ACETATE 10 MG/ML
1 SUSPENSION/ DROPS OPHTHALMIC DAILY
Status: DISCONTINUED | OUTPATIENT
Start: 2023-10-12 | End: 2023-10-15 | Stop reason: HOSPADM

## 2023-10-12 RX ORDER — INSULIN LISPRO 100 [IU]/ML
8-14 INJECTION, SOLUTION INTRAVENOUS; SUBCUTANEOUS
COMMUNITY
End: 2023-12-04

## 2023-10-12 RX ORDER — KETOROLAC TROMETHAMINE 5 MG/ML
1 SOLUTION OPHTHALMIC DAILY
Status: DISCONTINUED | OUTPATIENT
Start: 2023-10-12 | End: 2023-10-15 | Stop reason: HOSPADM

## 2023-10-12 RX ORDER — POTASSIUM CHLORIDE 20MEQ/15ML
60 LIQUID (ML) ORAL DAILY
Status: DISCONTINUED | OUTPATIENT
Start: 2023-10-12 | End: 2023-10-15 | Stop reason: HOSPADM

## 2023-10-12 RX ORDER — ROSUVASTATIN CALCIUM 10 MG/1
10 TABLET, COATED ORAL DAILY
Status: DISCONTINUED | OUTPATIENT
Start: 2023-10-12 | End: 2023-10-15 | Stop reason: HOSPADM

## 2023-10-12 RX ADMIN — KETOROLAC TROMETHAMINE 1 DROP: 5 SOLUTION OPHTHALMIC at 13:50

## 2023-10-12 RX ADMIN — PREDNISOLONE ACETATE 1 DROP: 10 SUSPENSION/ DROPS OPHTHALMIC at 13:50

## 2023-10-12 RX ADMIN — INSULIN ASPART 1 UNITS: 100 INJECTION, SOLUTION INTRAVENOUS; SUBCUTANEOUS at 08:36

## 2023-10-12 RX ADMIN — INSULIN HUMAN 6 UNITS: 100 INJECTION, SUSPENSION SUBCUTANEOUS at 23:19

## 2023-10-12 RX ADMIN — SODIUM BICARBONATE: 84 INJECTION, SOLUTION INTRAVENOUS at 03:11

## 2023-10-12 RX ADMIN — POTASSIUM CHLORIDE: 2 INJECTION, SOLUTION, CONCENTRATE INTRAVENOUS at 19:40

## 2023-10-12 RX ADMIN — SODIUM BICARBONATE: 84 INJECTION, SOLUTION INTRAVENOUS at 08:42

## 2023-10-12 RX ADMIN — MYCOPHENOLATE MOFETIL 1500 MG: 500 TABLET, FILM COATED ORAL at 08:39

## 2023-10-12 RX ADMIN — ROSUVASTATIN CALCIUM 10 MG: 10 TABLET, FILM COATED ORAL at 08:39

## 2023-10-12 RX ADMIN — MYCOPHENOLATE MOFETIL 1500 MG: 500 TABLET, FILM COATED ORAL at 20:21

## 2023-10-12 RX ADMIN — AZITHROMYCIN 500 MG: 250 TABLET, FILM COATED ORAL at 16:52

## 2023-10-12 RX ADMIN — INSULIN ASPART 1 UNITS: 100 INJECTION, SOLUTION INTRAVENOUS; SUBCUTANEOUS at 13:52

## 2023-10-12 RX ADMIN — POTASSIUM CHLORIDE 60 MEQ: 1.5 SOLUTION ORAL at 16:52

## 2023-10-12 RX ADMIN — SIROLIMUS 3 MG: 2 TABLET ORAL at 08:39

## 2023-10-12 ASSESSMENT — ACTIVITIES OF DAILY LIVING (ADL)
ADLS_ACUITY_SCORE: 35
ADLS_ACUITY_SCORE: 24
ADLS_ACUITY_SCORE: 35

## 2023-10-12 NOTE — PHARMACY-ADMISSION MEDICATION HISTORY
Pharmacist Admission Medication History    Admission medication history is complete. The information provided in this note is only as accurate as the sources available at the time of the update.    Information Source(s): Patient, Family member (spouse, Shivani), Clinic records, and CareEverywhere/SureScripts via in-person    Pertinent Information:   Per patient and spouse, he did NOT take sirolimus PTA yesterday. Usually takes at noon. Level in process for today may not be true trough. Cards 2 updated.   Eye drops updated per last ophthalmology appointment 7/2023. Confirmed with bottles in patient's room   -Ketorolac (GRAY TOP) 1x daily   -Pred Forte (PINK or WHITE TOP MILKY DROP) 1x daily   -Latanoprost (GREEN or TEAL TOP DROP) 1x at bedtime   Patient has bowel prep meds, was supposed to go for outpatient colonoscopy on 10/13/23.   Patient hasn't taken Trulicity for a few weeks due to stomach issues  Difficult to clarify Humalog dosing. Reviewed last Endocrine office visit (7/25/23) to confirm dosing, however sliding scale used not found.     Changes made to PTA medication list:  Added: latanoprost  Deleted: Avastin injections (per last ophthalmology note, L eye last injection was 6/2/22 and R eye last injection was 9/14/22), Humalog (duplicate), NPH (duplicate), ofloxacin (not using)  Changed: None    Not including over the counter (OTC) medications, was there a time in the past 3 months when you did not take your medications as prescribed because of cost? No, however patient and spouse report all medications are too expensive.    Allergies reviewed with patient and updates made in EHR: yes    Medication History Completed By: Tahmina Grande RPH 10/12/2023 11:41 AM    PTA Med List   Medication Sig Last Dose    acetaminophen (TYLENOL) 325 MG tablet Take 3 tablets (975 mg) by mouth every 8 hours as needed for mild pain Unknown    aspirin (ASA) 81 MG chewable tablet Take 1 tablet (81 mg) by mouth daily (Patient taking  differently: Take 81 mg by mouth every morning) 10/11/2023 at am    bisacodyl (DULCOLAX) 5 MG EC tablet 2 days prior to procedure, take 2 tablets at 4 pm. 1 day prior to procedure, take 2 tablets at 4 pm. For additional instructions refer to your colonoscopy prep instructions.     Calcium Carb-Cholecalciferol (CALCIUM CARBONATE-VITAMIN D3) 600-400 MG-UNIT TABS TAKE ONE TABLET BY MOUTH TWICE A DAY WITH MEALS 10/11/2023 at am    Dulaglutide (TRULICITY) 4.5 MG/0.5ML SOPN Inject 4.5 mg Subcutaneous once a week Past Month    empagliflozin (JARDIANCE) 10 MG TABS tablet Take 1 tablet (10 mg) by mouth daily 10/11/2023 at am    ferrous sulfate (FEROSUL) 325 (65 Fe) MG tablet Take 1 tablet (325 mg) by mouth daily (with breakfast) 10/11/2023 at am    furosemide (LASIX) 20 MG tablet Take 1 tablet (20 mg) by mouth daily 10/11/2023 at am    HUMALOG KWIKPEN 100 UNIT/ML soln Inject 5-20 Units Subcutaneous 3 times daily (before meals) Max daily dose 60 units. 10/11/2023 at am    insulin NPH (HUMULIN N KWIKPEN) 100 UNIT/ML injection GIve 40 units each morning and 12 at night subcutaneous 10/11/2023 at am    ketorolac (ACULAR) 0.5 % ophthalmic solution Place 1 drop into the right eye 2 times daily (Patient taking differently: Place 1 drop into the right eye daily) 10/11/2023    latanoprost (XALATAN) 0.005 % ophthalmic solution Place 1 drop into both eyes at bedtime Past Week at hs    lisinopril (ZESTRIL) 10 MG tablet TAKE ONE TABLET BY MOUTH TWICE A DAY 10/11/2023 at am    magnesium oxide 400 MG tablet Take 1 tablet (400 mg) by mouth 2 times daily 10/11/2023 at am    mycophenolate (GENERIC EQUIVALENT) 500 MG tablet Take 3 tablets (1,500 mg) by mouth 2 times daily 10/11/2023 at am    omega-3 acid ethyl esters (LOVAZA) 1 g capsule Take 1 capsule (1 g) by mouth daily 10/11/2023 at am    polyethylene glycol (GOLYTELY) 236 g suspension 2 days prior at 5pm, mix and drink half of a jug of Golytely. Drink an 8 oz. glass of Golytely every 15  minutes until half of the jug is gone. Place remainder of Golytely in the refrigerator. 1 day prior at 5 pm, drink the 2nd half of a jug of Golytely bowel prep. 6 hours before your check-in time, drink an 8 oz. glass of Golytely every 15 minutes until half of the 2nd jug of Golytely is gone. Discard remainder of second jug.     prednisoLONE acetate (PRED FORTE) 1 % ophthalmic suspension Place 1 drop into the right eye 2 times daily (Patient taking differently: Place 1 drop Into the left eye daily) 10/11/2023 at am    rosuvastatin (CRESTOR) 20 MG tablet Take 1 tablet (20 mg) by mouth daily 10/11/2023 at am    senna-docusate (SENOKOT-S/PERICOLACE) 8.6-50 MG tablet Take 1 tablet by mouth 2 times daily as needed (hold for loose stools) Unknown at prn    sirolimus (GENERIC EQUIVALENT) 0.5 MG tablet Take 6 tablets (3 mg) by mouth daily 10/10/2023 at 1200    sulfamethoxazole-trimethoprim (BACTRIM) 400-80 MG tablet Take 1 tablet by mouth three times a week In the AM 10/11/2023 at am    tamsulosin (FLOMAX) 0.4 MG capsule TAKE ONE CAPSULE BY MOUTH ONCE DAILY 10/11/2023 at am

## 2023-10-12 NOTE — CONSULTS
[facility]    Transplant Infectious Diseases Inpatient Consultation      Lucian Oliveira MRN# [id number]   YOB: 1955 Age: 67 year old   Date of Admission and time: 10/11/2023 11:02 AM     Reason for consult: I was asked by Dr. Hu to evaluate this patient for diarrhea, positive EAEC and norovirus in the setting of previous heart transplant in 2020 .             Recommendations:   - Start Azithromycin  mg daily for EAEC treatment, duration TBD  - Obtain IgG serum level, if <600 would give one time dose of IVIG @ 500 mL/kg  - Discuss with heart transplant team if immunosuppression can be lowered in the acute phase of this infection to help mount a greater immune response.         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 Initially presented afebrile, without leukocytosis, and hypotensive which corrected with fluid replacement. Stool testing found to be positive for EAEC and norovirus, C Diff testing showing positive PCR and GDH but negative Toxin. Given history of no recent hospitalizations, antibiotics, or exposures to C Diff, likely believe he is colonized but that it is not the source of his diarrhea. There is no previous history or norovirus testing per chart review, so it is not possible to comment on if this is an acute process or the PCR is evident of a prior infection. With his immunocompromised status, we would recommend treatment of the EAEC and pin this as the most likely source of his acute diarrhea. He could be appropriately with either ciprofloxacin or azithromycin, but given his DAYA, will opt for azithro.         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   - PJP prophylaxis: Bactrim (TuThSa - held on admission)  - Serostatus: CMV D+/R+, EBV D+/R+, HSV1+/2-, VZV +  - Immunization status: This patient received three doses of the COVID-19 vaccine, last in March 2022.   - Gamma globulin status: Not recently checked      Thank you for involving me in the care of Mr. Lucian Oliveira. Please do not hesitate to call me for any question.     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 A temperature was never taken at home but he does not recall  feeling warm at all. He stated that this started out of the blue, and he does not remember anything different happening prior to this. Stools have been liquid in consistency, dark brown/black in color without any signs of bright blood, and has been happening 5-7 times daily. When asked, Mr. Oliveira stated that he has never experienced diarrhea like this and has had no infection complications after his transplant. He has not had any recent changes to his diet and does not know of any sick contacts. At home, his normal diet consists of rice, beans, veggies, fruits, and meats like steak and chicken. During this three weeks of diarrhea, he has not had any nausea or vomiting and says that his appetite has been fairly normal. Over the past day, the headache and  lightheadedness came on, which prompted him to seek evaluation. In the ED during our conversation, he states that he overall is feeling decent and much better than days prior.     Mr. Oliveira lives at home with his wife in [place], [place], where they have lived for many years. They also have one dog as a pet that the have had for a long time. He is not working and is currently retired. The last time he was hospitalized was a while ago and he had not recently had any antibiotics outside of his normally scheduled Bactrim. He does not note starting any new medications recently. No current headache, changes in vision, chest pain, shortness of breath, abdominal pain, extremity pain, or new swelling.               Review of Systems:      As mentioned in the HPI otherwise negative by reviewing constitutional symptoms, central and peripheral neurological systems, respiratory system, cardiac system, GI system,  system, musculoskeletal, skin, allergy, and lymphatics.                  Past Medical History:     Past Medical History:   Diagnosis Date    CAD (coronary artery disease)     CHF (congestive heart failure) (H)     CKD (chronic kidney disease), stage III (H)     Cortical cataract of both eyes     Diabetes (H)     Hyperlipidemia     Hypertension     Infection due to Streptococcus mitis group 09/23/2020    Ischemic cardiomyopathy     Obesity     CHANTEL (obstructive sleep apnea)     occas cpap    CHANTEL (obstructive sleep apnea)- severe (AHI 30)     Osteoarthritis             Past Surgical History:     Past Surgical History:   Procedure Laterality Date    CATARACT IOL, RT/LT      COLONOSCOPY N/A 8/7/2019    Procedure: COLONOSCOPY, WITH POLYPECTOMY AND BIOPSY;  Surgeon: Chauncey Morataya MD;  Location:  GI    COLONOSCOPY N/A 5/12/2023    Procedure: Colonoscopy;  Surgeon: Mariano Velasquez MD;  Location: [facility]    CV ANGIOGRAM CORONARY GRAFT N/A 7/2/2020    Procedure: Angiogram Coronary Graft;  Surgeon:  Alex Lantigua MD;  [facility]    CV CORONARY ANGIOGRAM N/A 7/2/2020    Procedure: CV CORONARY ANGIOGRAM;  Surgeon: Alex Lantigua MD;  [facility]    CV CORONARY ANGIOGRAM N/A 7/20/2021    Procedure: CV CORONARY ANGIOGRAM;  Surgeon: Raimundo Hudson MD;  [facility]    CV CORONARY ANGIOGRAM N/A 9/12/2022    Procedure: Coronary Angiogram- BIPLANE;  Surgeon: Raimundo Hudson MD;  [facility]    CV CORONARY ANGIOGRAM N/A 7/17/2023    Procedure: Coronary Angiogram;  Surgeon: Alex Lantigua MD;  [facility]    CV HEART BIOPSY N/A 7/27/2020    Procedure: Heart Biopsy;  Surgeon: Mario Burr MD;  Location:  HEART CARDIAC CATH LAB    CV HEART BIOPSY N/A 8/3/2020    Procedure: CV HEART BIOPSY;  Surgeon: Alex Lantigua MD;  [facility]    CV HEART BIOPSY N/A 8/10/2020    Procedure: CV HEART BIOPSY;  Surgeon: Mario Burr MD;  [facility]    CV HEART BIOPSY N/A 8/17/2020    Procedure: CV HEART BIOPSY;  Surgeon: Raimundo Hudson MD;  Location:  HEART CARDIAC CATH LAB    CV HEART BIOPSY N/A 8/31/2020    Procedure: CV HEART BIOPSY;  Surgeon: Moises Santos MD;  [facility]    CV HEART BIOPSY N/A 9/14/2020    Procedure: CV HEART BIOPSY;  Surgeon: Raimundo Hudson MD;  [facility]    CV HEART BIOPSY N/A 9/28/2020    Procedure: CV HEART BIOPSY;  Surgeon: Raimundo Hudson MD;  [facility]    CV HEART BIOPSY N/A 10/12/2020    Procedure: CV HEART BIOPSY;  Surgeon: John Stuart MD;  [facility]    CV HEART BIOPSY N/A 11/10/2020    Procedure: CV HEART BIOPSY;  Surgeon: John Stuart MD;  [facility]: [facility] CARDIAC CATH LAB    CV HEART BIOPSY N/A  12/7/2020    Procedure: CV HEART BIOPSY;  Surgeon: Raimundo Hudson MD;  Location:  HEART CARDIAC CATH LAB    CV HEART BIOPSY N/A 1/5/2021    Procedure: CV HEART BIOPSY;  Surgeon: Raimundo Hudson MD;  Location:  HEART CARDIAC CATH LAB    CV HEART BIOPSY N/A 7/20/2021    Procedure: CV HEART BIOPSY;  Surgeon: Raimundo Hudson MD;  [facility]    CV HEART BIOPSY N/A 9/28/2021    Procedure: CV HEART BIOPSY;  Surgeon: Raimundo Hudson MD;  Location:  HEART CARDIAC CATH LAB    CV HEART BIOPSY N/A 9/12/2022    Procedure: Heart Biopsy;  Surgeon: Raimundo Hudson MD;  Location:  HEART CARDIAC CATH LAB    CV HEART BIOPSY N/A 7/17/2023    Procedure: Heart Biopsy;  Surgeon: Alex Lantigua MD;  Location:  HEART CARDIAC CATH LAB    CV INTRA AORTIC BALLOON N/A 7/14/2020    Procedure: RHC WITH LEAVE IN SWAN/IABP;  Surgeon: Sonny Saleh MD;  Location:  HEART CARDIAC CATH LAB    CV INTRAVASULAR ULTRASOUND N/A 9/12/2022    Procedure: Intravascular Ultrasound;  Surgeon: Raimundo Hudson MD;  Location:  HEART CARDIAC CATH LAB    CV RIGHT HEART CATH MEASUREMENTS RECORDED N/A 3/25/2019    Procedure: CV RIGHT HEART CATH;  Surgeon: Moises Santos MD;  Location:  HEART CARDIAC CATH LAB    CV RIGHT HEART CATH MEASUREMENTS RECORDED N/A 7/10/2019    Procedure: CV RIGHT HEART CATH;  Surgeon: Jak Mccabe MD;  Location:  HEART CARDIAC CATH LAB    CV RIGHT HEART CATH MEASUREMENTS RECORDED N/A 7/8/2020    Procedure: Right Heart Cath with Leave In an already has ICU load;  Surgeon: Jak Mccabe MD;  Location:  HEART CARDIAC CATH LAB    CV RIGHT HEART CATH MEASUREMENTS RECORDED N/A 7/14/2020    Procedure: CV RIGHT HEART CATH;  Surgeon: Sonny Saleh MD;  Location:  HEART CARDIAC CATH LAB    CV RIGHT HEART CATH MEASUREMENTS RECORDED N/A 7/2/2020    Procedure: CV RIGHT HEART CATH;   Surgeon: Alex Lantigua MD;  [facility]    CV RIGHT HEART CATH MEASUREMENTS RECORDED N/A 7/27/2020    Procedure: Right Heart Cath;  Surgeon: Mario Burr MD;  Location:  HEART CARDIAC CATH LAB    CV RIGHT HEART CATH MEASUREMENTS RECORDED N/A 8/3/2020    Procedure: Right Heart Cath;  Surgeon: Alex Lantigua MD;  Location:  HEART CARDIAC CATH LAB    CV RIGHT HEART CATH MEASUREMENTS RECORDED N/A 8/10/2020    Procedure: CV RIGHT HEART CATH;  Surgeon: Mario Burr MD;  Location:  HEART CARDIAC CATH LAB    CV RIGHT HEART CATH MEASUREMENTS RECORDED N/A 8/17/2020    Procedure: CV RIGHT HEART CATH;  Surgeon: Raimundo Hudson MD;  Location:  HEART CARDIAC CATH LAB    CV RIGHT HEART CATH MEASUREMENTS RECORDED N/A 8/31/2020    Procedure: CV RIGHT HEART CATH;  Surgeon: Moises Santos MD;  Location:  HEART CARDIAC CATH LAB    CV RIGHT HEART CATH MEASUREMENTS RECORDED N/A 9/14/2020    Procedure: CV RIGHT HEART CATH;  Surgeon: Raimundo Hudson MD;  Location:  HEART CARDIAC CATH LAB    CV RIGHT HEART CATH MEASUREMENTS RECORDED N/A 9/28/2020    Procedure: CV RIGHT HEART CATH;  Surgeon: Raimundo Hudson MD;  Location:  HEART CARDIAC CATH LAB    CV RIGHT HEART CATH MEASUREMENTS RECORDED N/A 10/12/2020    Procedure: CV RIGHT HEART CATH;  Surgeon: John Stuart MD;  Location:  HEART CARDIAC CATH LAB    CV RIGHT HEART CATH MEASUREMENTS RECORDED N/A 11/10/2020    Procedure: CV RIGHT HEART CATH;  Surgeon: John Stuart MD;  Location:  HEART CARDIAC CATH LAB    CV RIGHT HEART CATH MEASUREMENTS RECORDED N/A 12/7/2020    Procedure: CV RIGHT HEART CATH;  Surgeon: Raimundo Hudson MD;  Location:  HEART CARDIAC CATH LAB    CV RIGHT HEART CATH MEASUREMENTS RECORDED N/A 1/5/2021    Procedure: CV RIGHT HEART CATH;  Surgeon: Raimundo Hudson MD;  Location:  HEART CARDIAC CATH LAB    CV RIGHT HEART  CATH MEASUREMENTS RECORDED N/A 7/20/2021    Procedure: CV RIGHT HEART CATH;  Surgeon: Raimundo Hudson MD;  Location:  HEART CARDIAC CATH LAB    CV RIGHT HEART CATH MEASUREMENTS RECORDED N/A 9/28/2021    Procedure: CV RIGHT HEART CATH;  Surgeon: Raimundo Hudson MD;  [facility]    CV RIGHT HEART CATH MEASUREMENTS RECORDED N/A 9/12/2022    Procedure: Right Heart Catheterization;  Surgeon: Raimundo Hudson MD;  [facility]    CV RIGHT HEART CATH MEASUREMENTS RECORDED N/A 7/17/2023    Procedure: Right Heart Catheterization;  Surgeon: Alex Lantigua MD;  Location:  HEART CARDIAC CATH LAB    EXTRACTION(S) DENTAL Left 7/13/2020    Procedure: EXTRACTION, TOOTH #11, 12, 13, 15, and 29;  Surgeon: Monica Chao DDS;  [facility]    IMPLANT AUTOMATIC IMPLANTABLE CARDIOVERTER DEFIBRILLATOR      INCISION AND DRAINAGE STERNUM W/ IRRIGATION SYSTEM, COMBINED N/A 9/23/2020    Procedure: INCISION AND DRAINAGE, LEFT SUPRACLAVICULAR WOUND INFECTION AND ABDOMENN WOUND.;  Surgeon: Ran Huertas MD;  [facility]    INSERT INTRAAORTIC BALLOON PUMP N/A 7/16/2020    Procedure: Subclavian Intra-Aortic Balloon Pump Placement;  Surgeon: Allan Sparrow MD;  [facility]    IRRIGATION AND DEBRIDEMENT CHEST WASHOUT, COMBINED N/A 9/28/2020    Procedure: IRRIGATION AND DEBRIDEMENT OF LEFT UPPER CHEST WOUND.;  Surgeon: Ran Huertas MD;  [facility]    PHACOEMULSIFICATION CLEAR CORNEA WITH STANDARD INTRAOCULAR LENS IMPLANT Right 2/6/2023    Procedure: RIGHT EYE PHACOEMULSIFICATION, CATARACT, WITH INTRAOCULAR LENS IMPLANT with trypan blue;  Surgeon: Triny Bunch MD;  [facility]    PHACOEMULSIFICATION CLEAR CORNEA WITH STANDARD IOL, VITRECTOMY PARSPLANA 25 GAGUE, COMBINED Left 12/10/2019    Procedure: PHACOEMULSIFICATION, CATARACT, CLEAR CORNEAL INCISION APPROACH, W STD INTRAOCULAR LENS IMPLANT INSERT + VITRECTOMY  BY PARS PLANA  USING 25-GAUGE INSTRUMENTS. ENDOLASER, INFUSION OF 20% SF6 GAS;  Surgeon: Triny Bunch MD;  [facility]    PICC DOUBLE LUMEN PLACEMENT Left 07/28/2020    5Fr - 42cm, Basilic vein, mid SVC    PICC INSERTION Right 07/11/2020    basilic 44 cm total     TRANSPLANT HEART RECIPIENT N/A 7/19/2020    Procedure: REDO MEDIAN STERNOTOMY, TRANSPLANT, ORTHOTOPIC HEART, RECIPIENT, ON PUMP OXYGENATOR, REMOVAL OF CARDIAC DEFIBRILLATOR AND LEAD;  Surgeon: Griselli, Massimo, MD;  [facility]    VITRECTOMY, PARS PLANA APPROACH, USING 27-GAUGE INSTRUMENTS Left 12/21/2020    Procedure: 27 gauge pars plana vitectomy, membrane peel, perfluoroctane liquid (PFO), retinectomy, endolaser, Silicone Oil 1000 cs;  Surgeon: Triny Bunch MD;  Location:  OR    [facility] CABG, ARTERY-VEIN, THREE  02/2008            Social History:     Social History     Tobacco Use    Smoking status: Never    Smokeless tobacco: Never    Tobacco comments:     Never smoked; non-smoking household   Substance Use Topics    Alcohol use: No     Alcohol/week: 0.0 standard drinks of alcohol            Family History:   I have reviewed this patient's family history  Family History   Problem Relation Age of Onset    Diabetes Sister     Diabetes Sister     Diabetes Brother     Macular Degeneration No family hx of     Glaucoma No family hx of     Myocardial Infarction No family hx of     Kidney Disease No family hx of     Anesthesia Reaction No family hx of     Bleeding Disorder No family hx of     Venous thrombosis No family hx of             Immunizations:     Immunization History   Administered Date(s) Administered    COVID-19 Monovalent 18+ (Moderna) 03/04/2021, 04/01/2021    COVID-19 Monovalent Booster 18+ (Moderna) 09/09/2021, 03/02/2022    Influenza (High Dose) 3 valent vaccine 10/13/2019, 10/14/2021    Influenza (IIV3) PF 10/05/2011, 10/15/2012    Influenza Vaccine 65+ (Fluzone HD) 12/07/2020, 02/14/2023    Influenza Vaccine >6  months (Solomonuria,Fluzone) 10/27/2015, 09/14/2016, 10/05/2017, 10/03/2018    Pneumococcal 23 valent 08/04/2009, 04/03/2015, 03/02/2022    TDAP Vaccine (Adacel) 08/04/2009            Allergies:     Allergies   Allergen Reactions    Heparin Heparin Induced Thrombocytopenia     HIT screen sent 7/18/2020. CORRINE confirmed positive             Medications:   Medications that Require Transfusion:    - MEDICATION INSTRUCTIONS -      sodium bicarbonate 75 mEq in D5W 1,075 mL infusion 100 mL/hr at 10/12/23 1100     Scheduled Medications:    insulin aspart  1-7 Units Subcutaneous TID AC    insulin aspart  1-5 Units Subcutaneous At Bedtime    insulin NPH  20 Units Subcutaneous QAM AC    insulin NPH  6 Units Subcutaneous At Bedtime    ketorolac  1 drop Right Eye Daily    latanoprost  1 drop Both Eyes At Bedtime    [Held by provider] lisinopril  10 mg Oral BID    mycophenolate  1,500 mg Oral BID    prednisoLONE acetate  1 drop Left Eye Daily    rosuvastatin  10 mg Oral Daily    sirolimus  3 mg Oral Daily    [Held by provider] sulfamethoxazole-trimethoprim  1 tablet Oral Once per day on Tuesday Thursday Saturday    [Held by provider] tamsulosin  0.4 mg Oral Daily               Physical Exam:   Temp: 98.6  F (37  C) Temp src: Oral BP: 126/60 Pulse: 90     SpO2: 99 % O2 Device: None (Room air)      Wt Readings from Last 4 Encounters:   10/11/23 74.8 kg (165 lb)   10/09/23 74.8 kg (165 lb)   07/25/23 80.3 kg (177 lb)   07/17/23 82.5 kg (181 lb 12.8 oz)     Constitutional: awake, alert, cooperative, no apparent distress and appears at stated age, well nourished.   Head, ENT, Eyes, and Neck: Normocephalic, sinuses non-tender to palpation, external ears without lesions, moist buccal mucosa. EOMI, pink conjunctivae, non-icteric sclera. Neck supple without rigidity.   Lymphatics: no cervical adenopathy  Neurologic: Patient is moving all extremities without focal deficit, no focal sensory loss.   Lungs: CTA bilaterally, no accessory muscle  "use.   CVS: RRR, normal S1/S2 without murmur.  Abdomen: non-tender, non-distended, no masses, normal BS.   Musculoskeletal: Trace pitting edema of bilateral lower extremities, no ulcers, normal ROM of all joints  Skin: No rash on exposed skin           Data:   No results found for: \"ACD4\"    Inflammatory Markers    Recent Labs   Lab Test 09/25/20  0549 09/23/20  0710 09/22/20  1256 08/25/20  0445 07/08/19  1021   CRP 16.0* 42.0* 16.0* <2.9 9.9       Immune Globulin Studies   No lab results found.    Metabolic Studies       Recent Labs   Lab Test 10/12/23  1325 10/12/23  1053 10/12/23  0636 10/12/23  0105 10/11/23  2205 10/11/23  1926 10/11/23  1656 10/11/23  1128 10/10/23  1011 08/14/23  0917 07/17/23  1117 07/17/23  0908 02/06/23  0917 02/02/23  1121 09/24/20  0537 09/23/20  1147   NA  --   --  130* 131*  --  131*  --  130* 132* 134*   < > 140   < > 137   < >  --    POTASSIUM  --   --  3.8 3.6  --  3.7  --  4.1 4.2 5.1   < > 3.9   < > 4.5   < >  --    CHLORIDE  --   --  100 104  --  101  --  99 103 103   < > 103   < > 102   < >  --    CO2  --   --  12* 11*  --  10*  --  9* 12* 23   < > 24   < > 27   < >  --    ANIONGAP  --   --  18* 16*  --  20*  --  22* 17* 8   < > 13   < > 8   < >  --    BUN  --   --  83.9* 85.5*  --  86.2*  --  84.8* 73.7* 34.2*   < > 22.4   < > 27.6*   < >  --    CR  --   --  4.59* 4.88*  --  5.24*  --  5.77* 4.97* 2.40*   < > 1.95*   < > 2.26*   < >  --    GFRESTIMATED  --   --  13* 12*  --  11*  --  10* 12* 28*   < > 37*   < > 31*   < >  --    * 170* 160* 157* 150* 185*   < > 192* 214* 131*   < > 143*   < > 134*   < >  --    A1C  --   --   --   --   --   --   --   --   --   --   --  8.7*  --  8.3*   < >  --    BRYANNA  --   --  7.8* 8.1*  --  8.0*  --  8.6* 8.9 8.9   < > 9.2   < > 9.1   < >  --    PHOS  --   --   --   --   --   --   --  5.9* 4.8*  --   --  3.0   < > 3.0   < >  --    MAG  --   --  1.7  --   --   --   --  1.8  --   --   --  1.9   < > 1.8   < >  --    LACT  --   --   --   --  "  --   --   --  1.1  --   --   --   --   --   --   --  1.3   CKT  --   --   --   --   --   --   --   --   --   --   --  122  --  100   < >  --     < > = values in this interval not displayed.       Hepatic Studies    Recent Labs   Lab Test 10/11/23  1128 10/10/23  1011 07/17/23  0908 05/03/23  0844 02/02/23  1121 09/12/22  0854 03/14/22  1014 11/17/21  0848 08/31/20  0856 08/25/20  1705 07/03/20  1559 07/08/19  1021   BILITOTAL 0.2  --  0.3  --  0.3 0.3 0.4 0.4   < >  --    < > 0.6   ALKPHOS 124  --  122  --  128 146* 118 120   < >  --    < > 126   ALBUMIN 4.0 3.8 4.0 3.8  3.8 4.0 3.9 3.2* 3.4   < >  --    < > 3.4   AST 22  --  24  --  23 30 22 17   < >  --    < > 14   ALT 20  --  17  --  19 23 20 24   < >  --    < > 20   LDH  --   --   --   --   --   --   --   --   --  433*  --  174    < > = values in this interval not displayed.       Pancreatitis testing    Recent Labs   Lab Test 07/17/23  0908 02/02/23  1121 09/12/22  0854 11/17/21  0848 09/28/21  1031 07/19/21  0950 08/17/20  0847 07/19/20  1613   AMYLASE  --   --   --   --   --   --   --  36   TRIG 178* 365* 223* 322* 278* 220*   < >  --     < > = values in this interval not displayed.       Hematology Studies      Recent Labs   Lab Test 10/12/23  0605 10/11/23  1128 10/10/23  1011 07/28/23  1205 07/17/23  1041 07/17/23  0908 07/13/23  1235 07/20/21  1204 07/19/21  0950 01/05/21  0816 12/09/20  1526 11/10/20  0902 10/26/20  0924 10/08/20  0823 10/05/20  0540 10/04/20  0601 10/03/20  0519   WBC 3.9* 5.2 5.8 4.9  --  5.4 5.9   < > 5.4   < > 4.1   < > 7.3   < > 2.4* 1.6* 1.0*   ANEU  --   --   --   --   --   --   --   --  4.2  --  3.5  --  5.4  --  1.4* 1.2* 0.7*   ALYM  --   --   --   --   --   --   --   --  0.7*  --  0.4*  --  1.0  --  0.4* 0.2* 0.1*   ELAN  --   --   --   --   --   --   --   --  0.4  --  0.2  --  0.7  --  0.4 0.2 0.1   AEOS  --   --   --   --   --   --   --   --  0.1  --  0.0  --  0.1  --  0.0 0.0 0.0   HGB 8.6* 10.1* 10.0* 9.6* 9.6* 10.7*  "10.6*   < > 13.3   < > 10.7*   < >  --    < > 7.7* 7.2* 7.1*   HCT 25.5* 30.7* 30.9* 30.1*  --  33.1* 33.6*   < > 42.1   < > 34.2*   < >  --    < > 25.1* 23.9* 23.5*   * 203 197 225  --  185 217   < > 195   < > 262   < >  --    < > 279 243 224    < > = values in this interval not displayed.       Clotting Studies    Recent Labs   Lab Test 11/10/20  0902 10/12/20  0819 10/05/20  0540 10/04/20  0601 09/23/20  0710 09/22/20  1256 08/17/20  0847 08/03/20  0559 08/02/20  0641 08/01/20  0702   INR 1.02 1.06 1.10 1.19*   < > 1.13   < >  --   --   --    PTT  --   --   --   --   --  31  --  29 29 25    < > = values in this interval not displayed.       Arterial Blood Gas Testing    Recent Labs   Lab Test 10/12/23  0656 10/12/23  0251 10/11/23  1602 07/24/20  0829 07/23/20  2109 07/23/20  0404 07/22/20  2122 07/22/20  1608 07/22/20  1206 07/22/20  0845   PH  --   --   --   --   --  7.47* 7.44 7.44 7.43 7.41   PCO2  --   --   --   --   --  37 38 39 32* 34*   PO2  --   --   --   --   --  104 111* 80 103 123*   HCO3  --   --   --   --   --  27 26 27 22 21   O2PER 99 21 21 1.0L   < > 40  40 40 40  40 40.0 40.0  40.0    < > = values in this interval not displayed.        Urine Studies     Recent Labs   Lab Test 10/12/23  0258 07/28/23  1226 09/28/21  0926 09/25/20  1241 09/22/20  1448   URINEPH 5.0 5.5 6.5 5.5 5.0   NITRITE Negative Negative Negative Negative Negative   LEUKEST Negative Negative Negative Negative Negative   WBCU 3 <1 0 0 1       Vancomycin Levels     No lab results found.    Invalid input(s): \"VANCO\"    Tobramycin levels     No lab results found.    Gentamicin levels    No lab results found.    Tacrolimus levels    Invalid input(s): \"TACROLIMUS\", \"TAC\", \"TACR\"      Latest Ref Rng & Units 10/12/2023     6:36 AM 10/12/2023     6:05 AM 10/12/2023     1:05 AM 10/11/2023     7:26 PM 10/11/2023    11:28 AM   Transplant Immunosuppression Labs   Creat 0.67 - 1.17 mg/dL 4.59   4.88  5.24  5.77    Urea Nitrogen " "8.0 - 23.0 mg/dL 83.9   85.5  86.2  84.8    WBC 4.0 - 11.0 10e3/uL  3.9    5.2    Neutrophil %  72    73        Cyclosporine levels    Invalid input(s): \"CYCLOSPORINE\", \"CYC\"    Mycophenolate levels    Invalid input(s): \"MYPA\", \"MYP\"    Sirolimus levels    Invalid input(s): \"SIROLIMUS\", \"SIR\", \"RAPA\"    CSF testing   No lab results found.      Microbiology:  10/12/23 Enteric Panel positive for EAEC and norovirus      Last check of C difficile  C Difficile Toxin B by PCR   Date Value Ref Range Status   10/11/2023 Positive (A) Negative Final     Comment:     Detection of C. difficile nucleic acid in stools confirms the presence of these organisms in diarrheal patients but may not indicate that C. difficile is the etiologic agent of the diarrhea. Results from the Xpert C. difficile assay should be interpreted in conjunction with other laboratory and clinical data available to the clinician.    Patients with a positive C. difficile PCR result will receive reflex GDH/Toxin Immunoassay testing. Please interpret the PCR test result in conjunction with GDH/Toxin Immunoassay results and the clinical status of patient.   - GDH antigen positive, toxin negative    Virology:  Covid 19 - (10/11/23)    No results found for: \"H6RES\"    EBV DNA Copies/mL   Date Value Ref Range Status   07/17/2023 Not Detected Not Detected copies/mL Final   02/14/2023 Not Detected Not Detected copies/mL Final   02/02/2023 <500 (A) Not Detected copies/mL Final     Comment:     EBV DNA Detected below the reportable range of 500 copies/mL   07/19/2021 Not Detected Not Detected copies/mL Final   05/11/2021 EBV DNA Not Detected EBVNEG^EBV DNA Not Detected [Copies]/mL Final   01/05/2021 EBV DNA Not Detected EBVNEG^EBV DNA Not Detected [Copies]/mL Final   10/12/2020 EBV DNA Not Detected EBVNEG^EBV DNA Not Detected [Copies]/mL Final   08/17/2020 766 (A) EBVNEG^EBV DNA Not Detected [Copies]/mL Final       CMV Antibody IgG   Date Value Ref Range Status "   07/19/2020 >8.0 (H) 0.0 - 0.8 AI Final     Comment:     Positive  Antibody index (AI) values reflect qualitative changes in antibody   concentration that cannot be directly associated with clinical condition or   disease state.     07/08/2019 >8.0 (H) 0.0 - 0.8 AI Final     Comment:     Positive  Antibody index (AI) values reflect qualitative changes in antibody   concentration that cannot be directly associated with clinical condition or   disease state.         Toxoplasma Antibody IgG   Date Value Ref Range Status   07/08/2019 <3.0 0.0 - 7.1 IU/mL Final     Comment:     Negative- Absence of detectable Toxoplasma gondii IgG antibodies. A negative   result does not rule out acute infection.  The magnitude of the measured result is not indicative of the amount of   antibody present. The concentrations of anti-Toxoplasma gondii IgG in a given   specimen determined with assays from different manufacturers can vary due to   differences in assay methods and reagent specificity.           Imaging:  10/11/23 CT Abdomen Pelvis  1. No evidence of colitis or enteritis.  2. Liquid consistency stool in the colon with minimal fecalization  consistent with patient's history of diarrhea.  3. Cholelithiasis without evidence for acute cholecystitis.  4. Numerous jejunal diverticula without inflammation      ECHO  7/17/23:  Global and regional left ventricular function is normal with an EF of 55-60%.  Right ventricular function, chamber size, wall motion, and thickness are  normal.  Pulmonary artery systolic pressure cannot be assessed.  The inferior vena cava is normal.  No pericardial effusion is present.  No significant changes noted.  ____________________________

## 2023-10-12 NOTE — TELEPHONE ENCOUNTER
----- Message from Rebecca Glover RN sent at 10/12/2023  3:01 PM CDT -----  Regarding: colonoscopy  Pt was scheduled for colonoscopy in [facility] on 10/13.  We are cancelling because he is in the [place] emergency department for other problems.  Please follow up with him to reschedule him into the future when he is well.  thanks

## 2023-10-12 NOTE — PROGRESS NOTES
[facility]    Cardiology Progress Note- Cards 2        Date of Admission:  10/11/2023     Assessment & Plan: HVSL     Lucian Oliveira is a 67 year old male with a PMHX significant for DMII, HLD, CKD statge 3, HTN, RUE DVT c/b HIT, and HFrEF 2/2 ICM s/p OHT 7/19/2020 admitted on 10/11 for management of DAYA and anion gap metabolic acidosis secondary to extra-renal loss from Norovirus/EAEC diarrhea.     #Anion Gap Metabolic Acidosis  #Norovirus Diarrhea  #Enteroaggregative E.Coli Diarrhea  HCO3 9 on admission, AG 22, VBG 7.12. Patient presented with 3x weeks of diarrhea, found to be C.Diff positive by PCR and antigen, negative toxin suggests chronic colonization. Admission CT Abdomen/Pelvis negative for colitis. Serum ketones collected given anion gap and within normal limits.  - Nephrology consulted, appreciate recs  - Transplant Infectious Diseases consulted, appreciate recs  - Continue with 100 mL/hr NaHCO3 in D5W  Micro:  - Stool culture with Norovirus and EAEC  - CMV pending  - Blood cultures NGTD    #DAYA on CKD stage III, improving  Baseline Cr 1.5-1.7. On admission, Cr 5.77. BUN/Cr 14.55. Likely pre-renal given hx of diarrhea. Also possible to be d/t sirolimus toxicity; will check trough in AM.  Denies any dysuria or hematuria. Patient still making urine. UA negative for infection. No hydronephrosis on CT Abdomen/Pelvis  - Nephrology consulted   - Continue IVF as appropriate     #Chronic systolic HF 2/2 ICM now s/p OHT 7/19/2020  #Primary graft dysfunction, resolved  Postoperative course c/b primary graft dysfunction on POD1, EF 20-25% and severe RV dysfunction felt secondary to prolonged ischemic time. Graft function returned to normal on POD 6. Tachycardic at baseline.     Primary Cardiologist: Dr. Sheridan   Graft Function:   --Volume: PTA lasixs 20mg every day; will hold given c/f hypovolemia    - Admission weight: 165     Immunosuppression:   -  Prednisone: none   --Continue Mycophenolate 1500 mg BID  --Continue Sirolimus 3mg every day               - Sirolimus level at 5.1, no changes at this time     Serostatus: CMV: D+/R+, EBV: D+/R+, Toxo: D+/R-     Prophylaxis:   --CAV: ASA 81, continue rosuvastatin 20 mg daily              - Cardiac Cath on 7/17/2023: no angiographic CAD, similar to prior   --Thrush: None    --PCP: Bactrim 400-80mg, M/W/F; Hold given diarrhea  --GI: Protonix   --Osteoporosis: calcium/vitamin D   --CMV: None   --Toxo: Bactrim lifelong given D+      Routine Surveillance:   - Last RHC with biopsy: 7/17/2023; elevated R and L sided filling pressures with mild PH and persevered CO (RA 14mmHg, RV 38/14 mmHg, PCWP 22mmHg, Jacy CO/CI 6.4/3.4, PVR 1.24 pendleton,  dynes)              - Biopsy   - ECHO: 7/17/2023; normal LVF with EF 55-60%, no regional wall motion abnormalities.      #Poorly controlled T2DM on insulin   Recent A1C 8.7. Now insulin dependent and basal-bolus insulin.  Home regimen Trulicity 4.5 mg subcutaneous once a week, Humulin NPH 40 units in am and 12 units in pm, Humalog 12-14 with meal, Jardiance 10 mg daily.   - A1C  - Hypoglycemic protocol  - Basal NPH 20U qAM on 6U qPM; Medium sliding scale insulin   - Hold Jardiance 10mg every day   - Hold Trulicity 4.5mg SubQ injection Qweek      #Vitreous hemorrhage, R eye (4/2021)   #Proliferative Diabetic Retinopathy, left eye   #Ocular HTN, bilateral eyes   Last evaluated by ophthalmology on 7/27/2023.   - On Bevacizumab last dose on 7/27/2023  - Continue latanoprost at bedtime both eyes   - Continue  prednisolone 1x daily left eye  - Continue Ketorolac 1x daily right eye      #Hypertension   - Hold PTA lisinopril 10mg every day, in setting of DAYA and hypotension     #Chronic anemia   #Iron deficency anemia  - Hold ferrous 325mg supplementation given acute infection   - Daily CBC     # Hyponatremia,  Na 132 on admission. Likely hypovolemic hyponatremia due to extra-renal losses  given diarrhea.   - Continue to monitor on BMP, okay to continue sodium bicarb per renal     #RIJ and RUE DVT, provoked   #+HIT  US 7/22/20 nonocclusive thrombus in the right internal jugular vein along the intravenous catheter, nonocclusive thrombus along the right PICC line in the right axillary vein demonstrated, and occlusive thrombus in the superficial right cephalic vein demonstrated as above. +HIT and started on fondaparinux. Treated with Fondaparinux for 3 months and was followed by [facility] as an outpatient.      #Urinary retention   - Hold PTA tamsulosin given DAYA     #Donor Streptococcus salivarius bacteremia  Post-transplant was treated with ceftriaxone.       Diet: Low lactose diet  DVT Prophylaxis: Not indicated  Calderon Catheter: Not present  Cardiac Monitoring: ACTIVE order. Indication: Acute decompensated heart failure (48 hours)  Code Status: Full Code    \  Disposition Plan   Expected discharge: 3-4 days pending resolution of diarrhea and DAYA    Patient discussed with [facility] 2 staff.       Fracisco Hu MD  [facility]  ______________________________________________________________________    Interval History     NAEON. Ongoing concern for moderate volume diarrhea, reportedly 5 episodes overnight per patient. Patient reports that he did take sirolimus and MMF as scheduled prior to hospital admission on 10/11. No other acute complaints when seen today.     Physical Exam   Vital Signs: Temp: 98.6  F (37  C) Temp src: Oral BP: 126/60 Pulse: 90     SpO2: 99 % O2 Device: None (Room air)    Weight: 165 lbs 0 oz    General: NAD, resting comfortably in bed  HEENT: atraumatic, no ulceration or bleeding noted from mouth  Lymph: no lymphadenopathy appreciated at neck  Cardiac: Tachycardic, regular, normal S1 and S2, no murmurs appreciated  Lung: Clear bilaterally   Abdomen: No tenderness to palpation  Extremities: No edema or cyanosis noted, warm with 2+ pulses  Neuro:  No focal deficits noted, alert and oriented x3  Skin: No new rashes noted on exam    Data     I have personally reviewed the following data over the past 24 hrs:    3.9 (L)  \   8.6 (L)   / 130 (L)     130 (L) 100 83.9 (H) /  170 (H)   3.8 12 (L) 4.59 (H) \       Imaging results reviewed over the past 24 hrs:   Recent Results (from the past 24 hour(s))   CT Abdomen Pelvis w/o Contrast    Narrative    EXAMINATION: CT ABDOMEN PELVIS W/O CONTRAST, 10/11/2023 7:07 PM    TECHNIQUE:  Helical CT images from the lung bases through the  symphysis pubis were obtained without IV contrast and with oral  contrast. Contrast dose: 500 cc oral Omnipaque    COMPARISON: CT 9/22/2020    HISTORY: Pt with ICM s/p OHT (2020) now presenting with diarrhea --  eval for colitis.    FINDINGS:    Liver: Normal parenchymal attenuation without focal mass.  Biliary system: Cholelithiasis. No CT evidence for acute  cholecystitis.. No intrahepatic or extrahepatic biliary ductal  dilatation.  Pancreas: Mild fatty atrophy of the pancreas. No focal mass or  dilation of the main pancreatic duct.  Stomach: Within normal limits.  Spleen: Within normal limits.  Adrenal glands: Within normal limits.  Kidneys: Atrophic kidneys. No hydronephrosis, mass, or calculi..  Bladder: Partially distended and unremarkable.  Reproductive organs: Within normal limits.  Bowel: Normal caliber large and small bowel. Appendix unremarkable.  Liquid consistency stool in the colon with minimal fecalization. No  bowel wall thickening or inflammatory changes to suggest colitis or  enteritis. There is several jejunal diverticula without surrounding  inflammatory change.  Lymph nodes: No intra-abdominal or pelvic lymphadenopathy.  Vasculature: Abdominal aorta is normal in caliber.  Peritoneum: No intraabdominal free fluid or air.     Chest: Heart size normal. No pericardial effusion. Small hiatal  hernia.. Distal thoracic aorta is unremarkable. No focal infiltrate.  Scattered  sub-4 mm pulmonary nodules, for example the 2 mm solid  nodule in the peripheral right middle lobe on series 3 image 9.    Bones: No suspicious osseous lesion. Surgical changes of the chest.    Soft tissue: Bilateral gynecomastia.      Impression    IMPRESSION:   1. No evidence of colitis or enteritis.  2. Liquid consistency stool in the colon with minimal fecalization  consistent with patient's history of diarrhea.  3. Cholelithiasis without evidence for acute cholecystitis.  4. Numerous jejunal diverticula without inflammation    I have personally reviewed the examination and initial interpretation  and I agree with the findings.    NIKOLE DAVILA MD         SYSTEM ID:  [id number]

## 2023-10-12 NOTE — TELEPHONE ENCOUNTER
Caller: No call made  Reason for Reschedule/Cancellation (please be detailed, any staff messages or encounters to note?): Currently inpatient.       Prior to reschedule please review:  Ordering Provider: Mariano Velasquez MD   Sedation per order: Moderate  Does patient have any ASC Exclusions, please identify?: Y - s/p heart tx      Notes on Cancelled Procedure:  Procedure: Lower Endoscopy [Colonoscopy]   Date: 10/13/2023  Location: [facility]; [address] [place] [address], [address] Floor, [place] [address]  Surgeon: Eva      Rescheduled: No

## 2023-10-12 NOTE — PROGRESS NOTES
Cardiology progress note    Afternoon BMP with notable decline in potassium and bicarb, question if possible error during lab draw. Hypokalemia could be secondary to ongoing bicarb drip versus ongoing bowel output from viral and bacterial diarrhea. Bicarb loss is also likely secondary to diarrhea. Per discussion with renal planned to do the following:    - Adjust to 150 mEq sodium bicarb drip with 40 mEq of potassium chloride  - Added oral potassium solution 60 mEq  - Added phosphorus lab, will replete for phosphorus greater than 2  - Repeat BMP at 2000  - EKG to assess for new arrhythmia, continue telemetry for monitoring    Fracisco Hu MD   Medicine PGY3

## 2023-10-12 NOTE — PROGRESS NOTES
Nephrology Progress Note  10/12/2023         Assessment & Recommendations:   Mr. Lucian Oliveira is a 67 year old male with a history of DMII, HTN, HLD, CAD s/p CABG with ICM now s/p OHT (2020), CKDIII who presented to the ED with ~3 weeks of worsening diarrhea, dizziness, and headache. Nephrology was consulted for DAYA on CKD.      #DAYA  Baseline Scr appears to be ~1.2-1.7 prior to transplant. Fluctuated between 1.7 and 2.5 over the last year. Elevated to 5.77 on admission. UA with mild proteinuria otherwise bland. Likely 2/2 to volume depletion. Renal US 6/13 normal renal architecture with no hydronephrosis. Patient with AUOP less concerning for obstructive etiology. Scr improved today but still significantly elevated.     #CKD, stage 3b  See above. Long standing chronic kidney disease likely CNI toxicity plus ASVD. On chronic immunosuppressants with known renal toxicity. Was previously taking tacrolimus but stopped and switched to mycophenolate and rapamycin in 7/2023 due to worsening renal function. Renal US 6/13 normal. Has DMII not well controlled. Last A1C (7/17/23) 8.7. Seen by nephrology OP 10/10/2023 in clinic at request of patient's cardiologist Dr. Sheridan.     #HAGMA  #NAGMA  Serum bicarb 9 on admission. Anion gap 22. Lactate within normal limits, ketones negative. HAGMA likely 2/2 to chronic kidney disease. Also likely concurrent normal anion gap metabolic acidosis given diarrhea and history of lisinopril use. OK to give IV fluids with bicarb. VBG showing metabolic acidosis with near adequate respiratory compensation. Bicarb improving.   -venous blood gas once daily  -continue with sodium bicarb 75 mEq in D5W at maintenance rate  -ok to bolus as needed with NS     #Hypotension, improving  Blood pressure 87/55 on admission improved with IV fluids. Blood pressure at home in the 120-130s. On lisinopril and furosemide for chronic hypertension.   -agree with holding lisinopril and furosemide until  "resolution of diarrhea     #Volume Status: dehydrated  Appears hypovolemic on exam. No edema. Admission wt 74.8 kg down from reported baseline of ~79 kg.   -agree with holding lasix  -fluids as above    #Diarrhea  3 weeks of watery diarrhea. Stool culture positive for norovirus, enteroaggregative ecoli. C. Diff PCR positive, toxin negative. Likely infectious etiology vs mycophenolate toxicity. MPA level ~180.   - agree with transplant ID consult  - consider decrease dose of MMF or switch to myfortic (enteric coated MMF)        Recommendations were communicated to primary team via note.     Seen and discussed with Dr. Jeremiah Longo, MS4  Division of Renal Disease and Hypertension    Interval History :   Nursing and provider notes from last 24 hours reviewed.  In the last 24 hours Lucian Oliveira:  -continued diarrhea but less frequent  -feeling better overall    Review of Systems:   I reviewed the following systems:  GI: Normal appetite. Denies nausea or vomiting. Continued diarrhea.   Neuro:  No confusion  Constitutional:  Denies fever or chills  CV: Denies dyspnea or edema.  Denies chest pain.    Physical Exam:   I/O last 3 completed shifts:  In: 350 [P.O.:120; I.V.:230]  Out: 150 [Urine:150]   /60   Pulse 90   Temp 98.6  F (37  C) (Oral)   Resp 20   Ht 1.702 m (5' 7\")   Wt 74.8 kg (165 lb)   SpO2 99%   BMI 25.84 kg/m       GENERAL APPEARANCE: In no acute distress, awake sitting up in bed eating breakfast. Pleasant and conversational.   EYES: No scleral icterus, pupils equal  Pulmonary: lungs clear to auscultation with equal breath sounds bilaterally, no clubbing  CV: regular rhythm, normal rate, no rub   - JVD none   - Edema none present in bilateral LE  GI: soft, nontender, normal bowel sounds  MS: no evidence of inflammation in joints, no muscle tenderness  : no villalobos  SKIN: no rash, warm, dry, no cyanosis  NEURO: face symmetric. A&O x3. No deficits grossly.  Access: [facility] R " forearm    Labs:   All labs reviewed by me  Electrolytes/Renal -   Recent Labs   Lab Test 10/12/23  1325 10/12/23  1053 10/12/23  0636 10/12/23  0105 10/11/23  2205 10/11/23  1926 10/11/23  1656 10/11/23  1128 10/10/23  1011 07/17/23  1117 07/17/23  0908   NA  --   --  130* 131*  --  131*  --  130* 132*   < > 140   POTASSIUM  --   --  3.8 3.6  --  3.7  --  4.1 4.2   < > 3.9   CHLORIDE  --   --  100 104  --  101  --  99 103   < > 103   CO2  --   --  12* 11*  --  10*  --  9* 12*   < > 24   BUN  --   --  83.9* 85.5*  --  86.2*  --  84.8* 73.7*   < > 22.4   CR  --   --  4.59* 4.88*  --  5.24*  --  5.77* 4.97*   < > 1.95*   * 170* 160* 157*   < > 185*   < > 192* 214*   < > 143*   BRYANNA  --   --  7.8* 8.1*  --  8.0*  --  8.6* 8.9   < > 9.2   MAG  --   --  1.7  --   --   --   --  1.8  --   --  1.9   PHOS  --   --   --   --   --   --   --  5.9* 4.8*  --  3.0    < > = values in this interval not displayed.       CBC -   Recent Labs   Lab Test 10/12/23  0605 10/11/23  1128 10/10/23  1011   WBC 3.9* 5.2 5.8   HGB 8.6* 10.1* 10.0*   * 203 197       LFTs -   Recent Labs   Lab Test 10/11/23  1128 10/10/23  1011 07/17/23  0908 05/03/23  0844 02/02/23  1121   ALKPHOS 124  --  122  --  128   BILITOTAL 0.2  --  0.3  --  0.3   ALT 20  --  17  --  19   AST 22  --  24  --  23   PROTTOTAL 7.0  --  6.6  --  6.7   ALBUMIN 4.0 3.8 4.0   < > 4.0    < > = values in this interval not displayed.       Iron Panel -   Recent Labs   Lab Test 10/10/23  1011 07/28/23  1205 07/17/23  0908   IRON 90 80 56*   IRONSAT 38 35 24   SEVERIANO 236 190 123         Imaging:  All imaging studies reviewed by me.     Current Medications:   insulin aspart  1-7 Units Subcutaneous TID AC    insulin aspart  1-5 Units Subcutaneous At Bedtime    insulin NPH  20 Units Subcutaneous QAM AC    insulin NPH  6 Units Subcutaneous At Bedtime    ketorolac  1 drop Right Eye Daily    latanoprost  1 drop Both Eyes At Bedtime    [Held by provider] lisinopril  10 mg Oral BID     mycophenolate  1,500 mg Oral BID    prednisoLONE acetate  1 drop Left Eye Daily    rosuvastatin  10 mg Oral Daily    sirolimus  3 mg Oral Daily    [Held by provider] sulfamethoxazole-trimethoprim  1 tablet Oral Once per day on Tuesday Thursday Saturday    [Held by provider] tamsulosin  0.4 mg Oral Daily      - MEDICATION INSTRUCTIONS -      sodium bicarbonate 75 mEq in D5W 1,075 mL infusion 100 mL/hr at 10/12/23 1100     Chuckey Longo    I have seen and examined this patient with the medical student and reviewed the labs and vital signs. I have edited this note.  This note reflects our joint assessment and plan.      68 yo M s/P OHT who presents to the hospital with 3 week history of diarrhea and now has DAYA on CKD. UA in July was bland so unlikely to be diabetic nephropathy.  Pt feels much better after ON IVF. Cr has not fallen as much as expected.  #CKD Likely CNI toxicity plus ASVD.   # DAYA most likely from volume depletion.  However he may have sustained some additional kidney damage  # Acidosis: elevated gap from CKD.  Ketones not checked bu may contribute. CEI and diarrhea likely contributing to low bicarb     Recs above     Jeimy Daniels MD

## 2023-10-13 LAB
ANION GAP SERPL CALCULATED.3IONS-SCNC: 11 MMOL/L (ref 7–15)
ANION GAP SERPL CALCULATED.3IONS-SCNC: 15 MMOL/L (ref 7–15)
ANION GAP SERPL CALCULATED.3IONS-SCNC: 17 MMOL/L (ref 7–15)
BASE EXCESS BLDV CALC-SCNC: -8.5 MMOL/L (ref -7.7–1.9)
BASO+EOS+MONOS # BLD AUTO: ABNORMAL 10*3/UL
BASO+EOS+MONOS NFR BLD AUTO: ABNORMAL %
BASOPHILS # BLD AUTO: 0 10E3/UL (ref 0–0.2)
BASOPHILS NFR BLD AUTO: 0 %
BUN SERPL-MCNC: 67.3 MG/DL (ref 8–23)
BUN SERPL-MCNC: 80.5 MG/DL (ref 8–23)
BUN SERPL-MCNC: 85.8 MG/DL (ref 8–23)
CALCIUM SERPL-MCNC: 7.9 MG/DL (ref 8.8–10.2)
CALCIUM SERPL-MCNC: 7.9 MG/DL (ref 8.8–10.2)
CALCIUM SERPL-MCNC: 8.2 MG/DL (ref 8.8–10.2)
CHLORIDE SERPL-SCNC: 105 MMOL/L (ref 98–107)
CHLORIDE SERPL-SCNC: 106 MMOL/L (ref 98–107)
CHLORIDE SERPL-SCNC: 106 MMOL/L (ref 98–107)
CREAT SERPL-MCNC: 2.89 MG/DL (ref 0.67–1.17)
CREAT SERPL-MCNC: 3.2 MG/DL (ref 0.67–1.17)
CREAT SERPL-MCNC: 3.64 MG/DL (ref 0.67–1.17)
DEPRECATED HCO3 PLAS-SCNC: 15 MMOL/L (ref 22–29)
DEPRECATED HCO3 PLAS-SCNC: 15 MMOL/L (ref 22–29)
DEPRECATED HCO3 PLAS-SCNC: 21 MMOL/L (ref 22–29)
EBV DNA # SPEC NAA+PROBE: NOT DETECTED COPIES/ML
EGFRCR SERPLBLD CKD-EPI 2021: 17 ML/MIN/1.73M2
EGFRCR SERPLBLD CKD-EPI 2021: 20 ML/MIN/1.73M2
EGFRCR SERPLBLD CKD-EPI 2021: 23 ML/MIN/1.73M2
EOSINOPHIL # BLD AUTO: 0.1 10E3/UL (ref 0–0.7)
EOSINOPHIL NFR BLD AUTO: 1 %
ERYTHROCYTE [DISTWIDTH] IN BLOOD BY AUTOMATED COUNT: 14.5 % (ref 10–15)
GLUCOSE BLDC GLUCOMTR-MCNC: 120 MG/DL (ref 70–99)
GLUCOSE BLDC GLUCOMTR-MCNC: 149 MG/DL (ref 70–99)
GLUCOSE BLDC GLUCOMTR-MCNC: 160 MG/DL (ref 70–99)
GLUCOSE BLDC GLUCOMTR-MCNC: 167 MG/DL (ref 70–99)
GLUCOSE SERPL-MCNC: 128 MG/DL (ref 70–99)
GLUCOSE SERPL-MCNC: 156 MG/DL (ref 70–99)
GLUCOSE SERPL-MCNC: 159 MG/DL (ref 70–99)
HCO3 BLDV-SCNC: 16 MMOL/L (ref 21–28)
HCT VFR BLD AUTO: 24.1 % (ref 40–53)
HGB BLD-MCNC: 8.2 G/DL (ref 13.3–17.7)
IGG SERPL-MCNC: 668 MG/DL (ref 610–1616)
IMM GRANULOCYTES # BLD: 0 10E3/UL
IMM GRANULOCYTES NFR BLD: 1 %
LYMPHOCYTES # BLD AUTO: 0.9 10E3/UL (ref 0.8–5.3)
LYMPHOCYTES NFR BLD AUTO: 23 %
MAGNESIUM SERPL-MCNC: 1.5 MG/DL (ref 1.7–2.3)
MAGNESIUM SERPL-MCNC: 2.1 MG/DL (ref 1.7–2.3)
MCH RBC QN AUTO: 27.6 PG (ref 26.5–33)
MCHC RBC AUTO-ENTMCNC: 34 G/DL (ref 31.5–36.5)
MCV RBC AUTO: 81 FL (ref 78–100)
MONOCYTES # BLD AUTO: 0.3 10E3/UL (ref 0–1.3)
MONOCYTES NFR BLD AUTO: 9 %
NEUTROPHILS # BLD AUTO: 2.5 10E3/UL (ref 1.6–8.3)
NEUTROPHILS NFR BLD AUTO: 66 %
NRBC # BLD AUTO: 0 10E3/UL
NRBC BLD AUTO-RTO: 0 /100
O2/TOTAL GAS SETTING VFR VENT: 21 %
PCO2 BLDV: 29 MM HG (ref 40–50)
PH BLDV: 7.35 [PH] (ref 7.32–7.43)
PHOSPHATE SERPL-MCNC: 3.6 MG/DL (ref 2.5–4.5)
PLATELET # BLD AUTO: 132 10E3/UL (ref 150–450)
PO2 BLDV: 85 MM HG (ref 25–47)
POTASSIUM SERPL-SCNC: 3.9 MMOL/L (ref 3.4–5.3)
POTASSIUM SERPL-SCNC: 4.4 MMOL/L (ref 3.4–5.3)
POTASSIUM SERPL-SCNC: 4.8 MMOL/L (ref 3.4–5.3)
RBC # BLD AUTO: 2.97 10E6/UL (ref 4.4–5.9)
SIROLIMUS BLD-MCNC: 5.7 UG/L (ref 5–15)
SODIUM SERPL-SCNC: 135 MMOL/L (ref 135–145)
SODIUM SERPL-SCNC: 138 MMOL/L (ref 135–145)
SODIUM SERPL-SCNC: 138 MMOL/L (ref 135–145)
TME LAST DOSE: NORMAL H
TME LAST DOSE: NORMAL H
WBC # BLD AUTO: 3.8 10E3/UL (ref 4–11)

## 2023-10-13 PROCEDURE — 83735 ASSAY OF MAGNESIUM: CPT | Performed by: INTERNAL MEDICINE

## 2023-10-13 PROCEDURE — 250N000012 HC RX MED GY IP 250 OP 636 PS 637

## 2023-10-13 PROCEDURE — 250N000009 HC RX 250: Performed by: INTERNAL MEDICINE

## 2023-10-13 PROCEDURE — 36415 COLL VENOUS BLD VENIPUNCTURE: CPT | Performed by: STUDENT IN AN ORGANIZED HEALTH CARE EDUCATION/TRAINING PROGRAM

## 2023-10-13 PROCEDURE — 250N000011 HC RX IP 250 OP 636: Mod: JZ | Performed by: INTERNAL MEDICINE

## 2023-10-13 PROCEDURE — 36415 COLL VENOUS BLD VENIPUNCTURE: CPT | Performed by: INTERNAL MEDICINE

## 2023-10-13 PROCEDURE — 99232 SBSQ HOSP IP/OBS MODERATE 35: CPT | Performed by: INTERNAL MEDICINE

## 2023-10-13 PROCEDURE — 83735 ASSAY OF MAGNESIUM: CPT

## 2023-10-13 PROCEDURE — 82803 BLOOD GASES ANY COMBINATION: CPT

## 2023-10-13 PROCEDURE — 250N000013 HC RX MED GY IP 250 OP 250 PS 637: Performed by: STUDENT IN AN ORGANIZED HEALTH CARE EDUCATION/TRAINING PROGRAM

## 2023-10-13 PROCEDURE — 80048 BASIC METABOLIC PNL TOTAL CA: CPT

## 2023-10-13 PROCEDURE — 84100 ASSAY OF PHOSPHORUS: CPT | Performed by: STUDENT IN AN ORGANIZED HEALTH CARE EDUCATION/TRAINING PROGRAM

## 2023-10-13 PROCEDURE — 99233 SBSQ HOSP IP/OBS HIGH 50: CPT | Mod: GC | Performed by: INTERNAL MEDICINE

## 2023-10-13 PROCEDURE — 36415 COLL VENOUS BLD VENIPUNCTURE: CPT

## 2023-10-13 PROCEDURE — 214N000001 HC R&B CCU UMMC

## 2023-10-13 PROCEDURE — 80195 ASSAY OF SIROLIMUS: CPT | Performed by: STUDENT IN AN ORGANIZED HEALTH CARE EDUCATION/TRAINING PROGRAM

## 2023-10-13 PROCEDURE — 85004 AUTOMATED DIFF WBC COUNT: CPT

## 2023-10-13 PROCEDURE — 258N000003 HC RX IP 258 OP 636: Performed by: INTERNAL MEDICINE

## 2023-10-13 PROCEDURE — 250N000013 HC RX MED GY IP 250 OP 250 PS 637: Performed by: INTERNAL MEDICINE

## 2023-10-13 RX ORDER — MAGNESIUM SULFATE HEPTAHYDRATE 40 MG/ML
2 INJECTION, SOLUTION INTRAVENOUS ONCE
Qty: 50 ML | Refills: 0 | Status: COMPLETED | OUTPATIENT
Start: 2023-10-13 | End: 2023-10-13

## 2023-10-13 RX ORDER — MYCOPHENOLATE MOFETIL 500 MG/1
1000 TABLET ORAL 2 TIMES DAILY
Status: DISCONTINUED | OUTPATIENT
Start: 2023-10-13 | End: 2023-10-15 | Stop reason: HOSPADM

## 2023-10-13 RX ADMIN — POTASSIUM CHLORIDE: 2 INJECTION, SOLUTION, CONCENTRATE INTRAVENOUS at 15:42

## 2023-10-13 RX ADMIN — POTASSIUM CHLORIDE: 2 INJECTION, SOLUTION, CONCENTRATE INTRAVENOUS at 06:33

## 2023-10-13 RX ADMIN — INSULIN ASPART 1 UNITS: 100 INJECTION, SOLUTION INTRAVENOUS; SUBCUTANEOUS at 13:05

## 2023-10-13 RX ADMIN — AZITHROMYCIN 500 MG: 250 TABLET, FILM COATED ORAL at 08:26

## 2023-10-13 RX ADMIN — POTASSIUM CHLORIDE 60 MEQ: 1.5 SOLUTION ORAL at 08:27

## 2023-10-13 RX ADMIN — MYCOPHENOLATE MOFETIL 1000 MG: 500 TABLET, FILM COATED ORAL at 20:05

## 2023-10-13 RX ADMIN — ROSUVASTATIN CALCIUM 10 MG: 10 TABLET, FILM COATED ORAL at 08:26

## 2023-10-13 RX ADMIN — KETOROLAC TROMETHAMINE 1 DROP: 5 SOLUTION OPHTHALMIC at 08:28

## 2023-10-13 RX ADMIN — PREDNISOLONE ACETATE 1 DROP: 10 SUSPENSION/ DROPS OPHTHALMIC at 08:27

## 2023-10-13 RX ADMIN — INSULIN ASPART 1 UNITS: 100 INJECTION, SOLUTION INTRAVENOUS; SUBCUTANEOUS at 08:25

## 2023-10-13 RX ADMIN — MAGNESIUM SULFATE IN WATER 2 G: 40 INJECTION, SOLUTION INTRAVENOUS at 09:45

## 2023-10-13 RX ADMIN — INSULIN HUMAN 6 UNITS: 100 INJECTION, SUSPENSION SUBCUTANEOUS at 21:31

## 2023-10-13 RX ADMIN — MYCOPHENOLATE MOFETIL 1500 MG: 500 TABLET, FILM COATED ORAL at 08:26

## 2023-10-13 RX ADMIN — SIROLIMUS 3 MG: 2 TABLET ORAL at 08:26

## 2023-10-13 ASSESSMENT — ACTIVITIES OF DAILY LIVING (ADL)
ADLS_ACUITY_SCORE: 24
ADLS_ACUITY_SCORE: 24
ADLS_ACUITY_SCORE: 26
ADLS_ACUITY_SCORE: 24
ADLS_ACUITY_SCORE: 26
ADLS_ACUITY_SCORE: 24
ADLS_ACUITY_SCORE: 26
ADLS_ACUITY_SCORE: 26

## 2023-10-13 NOTE — PROGRESS NOTES
Admission/Transfer   Diagnosis: DAYA  Admitted from: [facility]   Via: stretcher   Accompanied by: n/a  Belongings: Placed in closet   Admission paperwork: complete   Teaching: Call don't fall, use of console, meal times, visiting hours, orientation to unit, when to call for the RN (angina/sob/dizzyness, etc.), and the importance of safety.   Access: PIV   Telemetry: Placed on pt   Ht./Wt.: complete     Two nurse head-to-toe skin assessment performed by Antonio SIMON and Esther SIMON.  Skin issues noted: none.  See PCS for assessment and treatment of wounds and surgical sites.

## 2023-10-13 NOTE — PROGRESS NOTES
[facility]    Cardiology Progress Note- Cardiology        Date of Admission:  10/11/2023     Assessment & Plan: HVSL   Lucian Oliveira is a 67 year old male with a PMHX significant for DMII, HLD, CKD statge 3, HTN, RUE DVT c/b HIT, and HFrEF 2/2 ICM s/p OHT 7/19/2020 admitted on 10/11 for management of DAYA and anion gap metabolic acidosis secondary to extra-renal loss from Norovirus/EAEC diarrhea.     Updates:   - Continue 150 mEq sodium bicarb drip with 40 mEq of potassium chloride  - Decrease 1000 BID MMF  - No indication for IVIG  - Continue 500mg PO azithromycin, 10 day course     #Anion Gap Metabolic Acidosis (gap closed)   #NAGM (improving)  #Norovirus Diarrhea  #Enteroaggregative E.Coli Diarrhea  HCO3 9 on admission, AG 22, VBG 7.12. Patient presented with 3x weeks of diarrhea, found to be C.Diff positive by PCR and antigen, negative toxin suggests chronic colonization. Admission CT Abdomen/Pelvis negative for colitis. Serum ketones collected given anion gap and within normal limits.  - Nephrology consulted, appreciate recs   - Continue 150 mEq sodium bicarb drip with 40 mEq of potassium chloride  - Continue oral potassium solution 60 mEq QD  - Added phosphorus lab, will replete for phosphorus greater than 2  - Transplant Infectious Diseases consulted, appreciate recs   - IgG>600, no indication for IVIG   - Decreased MMF given acute infection     Micro:  - Stool culture with Norovirus and EAEC  - CMV negative  - Blood cultures NGTD    #DAYA on CKD stage III, improving  Baseline Cr 1.5-1.7. On admission, Cr 5.77. BUN/Cr 14.55. Likely pre-renal given hx of diarrhea. Also possible to be d/t sirolimus toxicity; will check trough in AM.  Denies any dysuria or hematuria. Patient still making urine. UA negative for infection. No hydronephrosis on CT Abdomen/Pelvis  - Nephrology consulted   - Continue IVF as appropriate     #Chronic systolic HF 2/2 ICM now  s/p OHT 7/19/2020  #Primary graft dysfunction, resolved  Postoperative course c/b primary graft dysfunction on POD1, EF 20-25% and severe RV dysfunction felt secondary to prolonged ischemic time. Graft function returned to normal on POD 6. Tachycardic at baseline.     Primary Cardiologist: Dr. Sheridan   Graft Function:   --Volume: PTA lasixs 20mg every day; will hold given c/f hypovolemia    - Admission weight: 165     Immunosuppression:   - Prednisone: none   --Continue Mycophenolate 1500 mg BID  --Continue Sirolimus 3mg every day               - Sirolimus level at 5.1, no changes at this time     Serostatus: CMV: D+/R+, EBV: D+/R+, Toxo: D+/R-     Prophylaxis:   --CAV: ASA 81, continue rosuvastatin 20 mg daily              - Cardiac Cath on 7/17/2023: no angiographic CAD, similar to prior   --Thrush: None    --PCP: Bactrim 400-80mg, M/W/F; Hold given diarrhea  --GI: Protonix   --Osteoporosis: calcium/vitamin D   --CMV: None   --Toxo: Bactrim lifelong given D+ ; Hold given diarrhea     Routine Surveillance:   - Last RHC with biopsy: 7/17/2023; elevated R and L sided filling pressures with mild PH and persevered CO (RA 14mmHg, RV 38/14 mmHg, PCWP 22mmHg, Jacy CO/CI 6.4/3.4, PVR 1.24 pendleton,  dynes)  - ECHO: 7/17/2023; normal LVF with EF 55-60%, no regional wall motion abnormalities.      #Poorly controlled T2DM on insulin   Recent A1C 8.7. Now insulin dependent and basal-bolus insulin.  Home regimen Trulicity 4.5 mg subcutaneous once a week, Humulin NPH 40 units in am and 12 units in pm, Humalog 12-14 with meal, Jardiance 10 mg daily.   - A1C  - Hypoglycemic protocol  - Basal NPH 20U qAM on 6U qPM; Medium sliding scale insulin   - Hold Jardiance 10mg every day   - Hold Trulicity 4.5mg SubQ injection Qweek      #Vitreous hemorrhage, R eye (4/2021)   #Proliferative Diabetic Retinopathy, left eye   #Ocular HTN, bilateral eyes   Last evaluated by ophthalmology on 7/27/2023.   - On Bevacizumab last dose on  7/27/2023  - Continue latanoprost at bedtime both eyes   - Continue  prednisolone 1x daily left eye  - Continue Ketorolac 1x daily right eye      #Hypertension   - Hold PTA lisinopril 10mg every day, in setting of DAYA and hypotension     #Chronic anemia   #Iron deficency anemia  - Hold ferrous 325mg supplementation given acute infection   - Daily CBC     # Hyponatremia,  Na 132 on admission. Likely hypovolemic hyponatremia due to extra-renal losses given diarrhea.   - Continue to monitor on BMP, okay to continue sodium bicarb per renal     #RIJ and RUE DVT, provoked   #+HIT  US 7/22/20 nonocclusive thrombus in the right internal jugular vein along the intravenous catheter, nonocclusive thrombus along the right PICC line in the right axillary vein demonstrated, and occlusive thrombus in the superficial right cephalic vein demonstrated as above. +HIT and started on fondaparinux. Treated with Fondaparinux for 3 months and was followed by [facility] as an outpatient.      #Urinary retention   - Hold PTA tamsulosin given DAYA     #Donor Streptococcus salivarius bacteremia  Post-transplant was treated with ceftriaxone.       Diet: Low lactose diet  DVT Prophylaxis: Not indicated  Calderon Catheter: Not present  Cardiac Monitoring: ACTIVE order. Indication: Acute decompensated heart failure (48 hours)  Code Status: Full Code          Clinically Significant Risk Factors        # Hypokalemia: Lowest K = 2.9 mmol/L in last 2 days, will replace as needed   # Hypocalcemia: Lowest Ca = 6.2 mg/dL in last 2 days, will monitor and replace as appropriate   # Hypomagnesemia: Lowest Mg = 1.5 mg/dL in last 2 days, will replace as needed  # Anion Gap Metabolic Acidosis: Highest Anion Gap = 22 mmol/L in last 2 days, will monitor and treat as appropriate      # Hypertension: Noted on problem list  # Chronic heart failure with preserved ejection fraction: heart failure noted on problem list and last echo with EF >50%      # DMII: A1C = 8.7 % (Ref  "range: <5.7 %) within past 6 months, PRESENT ON ADMISSION  # Overweight: Estimated body mass index is 25.2 kg/m  as calculated from the following:    Height as of this encounter: 1.702 m (5' 7\").    Weight as of this encounter: 73 kg (160 lb 14.4 oz)., PRESENT ON ADMISSION            Disposition Plan   Expected discharge: 4 - 7 days, recommended to prior living arrangement once eleytrolytes are stabalized.    Entered: Demetra Moran MD 10/13/2023, 8:10 AM   The patient's care was discussed with the Attending Physician, Dr. Holder .      Demetra Moran MD  Internal Medicine Y1  [facility]  ______________________________________________________________________    Interval History   Nursing notes reviewed. Bicarb drip increased yesterday to 150mEq. Patient reports he is still having loose stools, he had 3x this morning alone. He reports no chest pain, SOB, fever, arthralgias, or new cough.       Intake/Output Summary (Last 24 hours) at 10/13/2023 1106  Last data filed at 10/13/2023 0945  Gross per 24 hour   Intake 1583.33 ml   Output 400 ml   Net 1183.33 ml     Vitals:    10/11/23 2055 10/12/23 2046 10/13/23 0320   Weight: 74.8 kg (165 lb) 73.1 kg (161 lb 3.2 oz) 73 kg (160 lb 14.4 oz)         Physical Exam   Vital Signs: Temp: 98.6  F (37  C) Temp src: Oral BP: 107/60 Pulse: 94   Resp: 13 SpO2: 99 % O2 Device: None (Room air)    Weight: 160 lbs 14.4 oz      General: NAD, resting comfortably in bed  HEENT: atraumatic, no ulceration or bleeding noted from mouth  Lymph: no lymphadenopathy appreciated at neck  Cardiac: Tachycardic, regular, normal S1 and S2, no murmurs appreciated  Lung: Clear bilaterally   Abdomen: No tenderness to palpation  Extremities: No edema or cyanosis noted, warm with 2+ pulses  Neuro: No focal deficits noted, alert and oriented x3  Skin: No new rashes noted on exam     Medical Decision Making       Please see A&P for additional details of " medical decision making.      Data     I have personally reviewed the following data over the past 24 hrs:    3.8 (L)  \   8.2 (L)   / 132 (L)     135 105 85.8 (H) /  128 (H)   3.9 15 (L) 3.64 (H) \       Imaging results reviewed over the past 24 hrs:   No results found for this or any previous visit (from the past 24 hour(s)).

## 2023-10-13 NOTE — PROGRESS NOTES
[facility]    Transplant Infectious Diseases Inpatient Progress note      Lucian Oliveira MRN# [id number]   YOB: 1955 Age: 67 year old   Date of Admission: 10/11/2023 11:02 AM  Transplant: 7/19/2020 (Heart), Postoperative day: 1181            Recommendations:   Continue to treat EAEC with 500 mg PO azithromycin, 10 day course  No need for IVIG since serum IGG > 600  Does not need restricted diet from our standpoint    ID will sign off at this time, please do not hesitate to reach out with questions. Staffed with the attending physician, Dr. Hurtado.     Declan Larson MD  PGY1, Internal Medicine & Pediatrics Residency  [facility]  Pager: [phone]          Basics of Transplant and Current Presentation:   Transplants:  7/19/2020 (Heart), Postoperative day:  1181     This patient is a 67 year old male with a pmh of T2DM, HLD, CKD statge 3, HTN, RUE DVT c/b HIT, and HFrEF 2/2 ICM s/p OHT 7/19/2020 on sirolimus and mycophenolate, presenting with three weeks of profuse diarrhea and a day of dizziness and lightheadedness. Initially presented afebrile, without leukocytosis, and hypotensive which corrected with fluid replacement. Stool testing found to be positive for EAEC and norovirus, C Diff testing showing positive PCR and GDH but negative Toxin. Given history of no recent hospitalizations, antibiotics, or exposures to C Diff, likely believe he is colonized but that it is not the source of his diarrhea. There is no previous history or norovirus testing per chart review, so it is not possible to comment on if this is an acute process or the PCR is evident of a prior infection. With his immunocompromised status, we would recommend treatment of the EAEC and pin this as the most likely source of his acute diarrhea. He could be appropriately with either ciprofloxacin or azithromycin, but given his DAYA, will opt for azithro. He is currently tolerating well and  should complete a 10 day course.         Active Problems and Infectious Diseases Issues:   HLD  CKD statge 3, now with DAYA  HTN  RUE DVT c/b HIT  HFrEF 2/2 ICM s/p OHT 7/19/2020, hx of primary graft dysfunction        Old Problems and Infectious Diseases Issues:   Pseudomonas infection 10/05/2020         Other Infectious Disease issues include:  - QTc: 442 as of 10/12/2023.   - [facility]  - Serostatus: CMV D+/R+, EBV D+/R+, HSV1+/2-, VZV +  - Immunization status: This patient received three doses of the COVID-19 vaccine, last in March 2022.   - Gamma globulin status: Not recently checked    Interim History and Events:   NAEO. Still having about 5-7 loose stools per 24 hour period, but overall feeling well. Has been tolerating restricted diet just fine and is also doing fine with taking liquids. No abdominal pain, has been able to ambulate without headache or dizziness. Wife at bedside.    HPI:  This patient is a 67 year old male with a pmh of T2DM, HLD, CKD statge 3, HTN, RUE DVT c/b HIT, and HFrEF 2/2 ICM s/p OHT 7/19/2020 presenting with 3 weeks of diarrhea and one day of headache and lightheadedness. A temperature was never taken at home but he does not recall  feeling warm at all. He stated that this started out of the blue, and he does not remember anything different happening prior to this. Stools have been liquid in consistency, dark brown/black in color without any signs of bright blood, and has been happening 5-7 times daily. When asked, Mr. Oliveira stated that he has never experienced diarrhea like this and has had no infection complications after his transplant. He has not had any recent changes to his diet and does not know of any sick contacts. At home, his normal diet consists of rice, beans, veggies, fruits, and meats like steak and chicken. During this three weeks of diarrhea, he has not had any nausea or vomiting and says that his appetite has been fairly  normal. Over the past day, the headache and lightheadedness came on, which prompted him to seek evaluation. In the ED during our conversation, he states that he overall is feeling decent and much better than days prior.      Mr. Oliveira lives at home with his wife in [place], [place], where they have lived for many years. They also have one dog as a pet that the have had for a long time. He is not working and is currently retired. The last time he was hospitalized was a while ago and he had not recently had any antibiotics outside of his normally scheduled Bactrim. He does not note starting any new medications recently. No current headache, changes in vision, chest pain, shortness of breath, abdominal pain, extremity pain, or new swelling.       ROS:  As mentioned in the interim history otherwise negative by reviewing constitutional symptoms, central and peripheral neurological systems, respiratory system, cardiac system, GI system,  system, musculoskeletal, skin, allergy, and lymphatics.                 Pysical Examination:  Temp: 98.3  F (36.8  C) Temp src: Oral BP: 113/58 Pulse: 89   Resp: 15 SpO2: 100 % O2 Device: None (Room air)    Vitals:    10/11/23 2055 10/12/23 2046 10/13/23 0320   Weight: 74.8 kg (165 lb) 73.1 kg (161 lb 3.2 oz) 73 kg (160 lb 14.4 oz)     Constitutional: awake, alert, cooperative, no apparent distress and appears at stated age, well nourished.   Head, ENT, Eyes, and Neck: Normocephalic, atraumatic. EOMI, pink conjunctivae, non-icteric sclera.   Lymphatics: no cervical/axillary/inguinal LA bilaterally.    Neurologic: Patient is moving all extremities without focal deficit, no focal sensory loss.   Lungs: CTA bilaterally, no accessory muscle use  CVS: RRR, normal S1/S2, no murmur  Abdomen: non-tender, non-distended, no masses, no bruit, no shifting dullness, normal BS.   Musculoskeletal: trace pitting edema of bilateral lower extremities, no ulcers, normal ROM of all joints, no swelling  "or erythema of any of joints and no tenderness to palpation.   Skin: no induration or rash on exposed skin      Medications:  Medications that Require Transfusion:    D5W 1,000 mL with potassium chloride 40 mEq/L, sodium bicarbonate 150 mEq/L infusion 100 mL/hr at 10/13/23 1400    - MEDICATION INSTRUCTIONS -       Scheduled Medications:    azithromycin  500 mg Oral Daily    insulin aspart  1-7 Units Subcutaneous TID AC    insulin aspart  1-5 Units Subcutaneous At Bedtime    insulin NPH  20 Units Subcutaneous QAM AC    insulin NPH  6 Units Subcutaneous At Bedtime    ketorolac  1 drop Right Eye Daily    latanoprost  1 drop Both Eyes At Bedtime    [Held by provider] lisinopril  10 mg Oral BID    mycophenolate  1,000 mg Oral BID    potassium chloride  60 mEq Oral Daily    prednisoLONE acetate  1 drop Left Eye Daily    rosuvastatin  10 mg Oral Daily    sirolimus  3 mg Oral Daily    [Held by provider] sulfamethoxazole-trimethoprim  1 tablet Oral Once per day on Tuesday Thursday Saturday    [Held by provider] tamsulosin  0.4 mg Oral Daily         Laboratory Data:   No results found for: \"ACD4\"    Inflammatory Markers    Recent Labs   Lab Test 09/25/20  0549 09/23/20  0710 09/22/20  1256 08/25/20  0445 07/08/19  1021   CRP 16.0* 42.0* 16.0* <2.9 9.9       Immune Globulin Studies     Recent Labs   Lab Test 10/12/23  2219          Metabolic Studies       Recent Labs   Lab Test 10/13/23  1232 10/13/23  0824 10/13/23  0609 10/12/23  2301 10/12/23  2219 10/12/23  1651 10/12/23  1433 10/12/23  1053 10/12/23  0636 10/12/23  0105 10/11/23  2205 10/11/23  1926 10/11/23  1656 10/11/23  1128 07/17/23  1117 07/17/23  0908 02/06/23  0917 02/02/23  1121 09/24/20  0537 09/23/20  1147   NA  --   --  135  --  133*  --  135  --  130* 131*  --  131*  --  130*   < > 140   < > 137   < >  --    POTASSIUM  --   --  3.9  --  4.4  --  2.9*  --  3.8 3.6  --  3.7  --  4.1   < > 3.9   < > 4.5   < >  --    CHLORIDE  --   --  105  --  104  --  " 111*  --  100 104  --  101  --  99   < > 103   < > 102   < >  --    CO2  --   --  15*  --  13*  --  9*  --  12* 11*  --  10*  --  9*   < > 24   < > 27   < >  --    ANIONGAP  --   --  15  --  16*  --  15  --  18* 16*  --  20*  --  22*   < > 13   < > 8   < >  --    BUN  --   --  85.8*  --  85.0*  --  68.8*  --  83.9* 85.5*  --  86.2*  --  84.8*   < > 22.4   < > 27.6*   < >  --    CR  --   --  3.64*  --  3.96*  --  3.33*  --  4.59* 4.88*  --  5.24*  --  5.77*   < > 1.95*   < > 2.26*   < >  --    GFRESTIMATED  --   --  17*  --  16*  --  19*  --  13* 12*  --  11*  --  10*   < > 37*   < > 31*   < >  --    * 149* 128* 143* 132* 183* 136*   < > 160* 157*   < > 185*   < > 192*   < > 143*   < > 134*   < >  --    A1C  --   --   --   --   --   --   --   --   --   --   --   --   --   --   --  8.7*  --  8.3*   < >  --    BRYANNA  --   --  7.9*  --  8.4*  --  6.2*  --  7.8* 8.1*  --  8.0*  --  8.6*   < > 9.2   < > 9.1   < >  --    PHOS  --   --  3.6  --   --   --  3.8  --   --   --   --   --   --  5.9*   < > 3.0   < > 3.0   < >  --    MAG  --   --  1.5*  --   --   --   --   --  1.7  --   --   --   --  1.8  --  1.9   < > 1.8   < >  --    LACT  --   --   --   --   --   --   --   --   --   --   --   --   --  1.1  --   --   --   --   --  1.3   CKT  --   --   --   --   --   --   --   --   --   --   --   --   --   --   --  122  --  100   < >  --     < > = values in this interval not displayed.       Hepatic Studies    Recent Labs   Lab Test 10/11/23  1128 10/10/23  1011 07/17/23  0908 05/03/23  0844 02/02/23  1121 09/12/22  0854 03/14/22  1014 11/17/21  0848 08/31/20  0856 08/25/20  1705 07/03/20  1559 07/08/19  1021   BILITOTAL 0.2  --  0.3  --  0.3 0.3 0.4 0.4   < >  --    < > 0.6   ALKPHOS 124  --  122  --  128 146* 118 120   < >  --    < > 126   ALBUMIN 4.0 3.8 4.0 3.8  3.8 4.0 3.9 3.2* 3.4   < >  --    < > 3.4   AST 22  --  24  --  23 30 22 17   < >  --    < > 14   ALT 20  --  17  --  19 23 20 24   < >  --    < > 20   LDH  --    --   --   --   --   --   --   --   --  433*  --  174    < > = values in this interval not displayed.       Pancreatitis testing    Recent Labs   Lab Test 07/17/23  0908 02/02/23  1121 09/12/22  0854 11/17/21  0848 09/28/21  1031 07/19/21  0950 08/17/20  0847 07/19/20  1613   AMYLASE  --   --   --   --   --   --   --  36   TRIG 178* 365* 223* 322* 278* 220*   < >  --     < > = values in this interval not displayed.       Hematology Studies      Recent Labs   Lab Test 10/13/23  0609 10/12/23  0605 10/11/23  1128 10/10/23  1011 07/28/23  1205 07/17/23  1041 07/17/23  0908 07/20/21  1204 07/19/21  0950 01/05/21  0816 12/09/20  1526 11/10/20  0902 10/26/20  0924 10/08/20  0823 10/05/20  0540 10/04/20  0601 10/03/20  0519   WBC 3.8* 3.9* 5.2 5.8 4.9  --  5.4   < > 5.4   < > 4.1   < > 7.3   < > 2.4* 1.6* 1.0*   ANEU  --   --   --   --   --   --   --   --  4.2  --  3.5  --  5.4  --  1.4* 1.2* 0.7*   ALYM  --   --   --   --   --   --   --   --  0.7*  --  0.4*  --  1.0  --  0.4* 0.2* 0.1*   ELAN  --   --   --   --   --   --   --   --  0.4  --  0.2  --  0.7  --  0.4 0.2 0.1   AEOS  --   --   --   --   --   --   --   --  0.1  --  0.0  --  0.1  --  0.0 0.0 0.0   HGB 8.2* 8.6* 10.1* 10.0* 9.6* 9.6* 10.7*   < > 13.3   < > 10.7*   < >  --    < > 7.7* 7.2* 7.1*   HCT 24.1* 25.5* 30.7* 30.9* 30.1*  --  33.1*   < > 42.1   < > 34.2*   < >  --    < > 25.1* 23.9* 23.5*   * 130* 203 197 225  --  185   < > 195   < > 262   < >  --    < > 279 243 224    < > = values in this interval not displayed.       Arterial Blood Gas Testing    Recent Labs   Lab Test 10/13/23  0608 10/12/23  1433 10/12/23  0656 10/12/23  0251 07/23/20  2109 07/23/20  0404 07/22/20  2122 07/22/20  1608 07/22/20  1206 07/22/20  0845   PH  --   --   --   --   --  7.47* 7.44 7.44 7.43 7.41   PCO2  --   --   --   --   --  37 38 39 32* 34*   PO2  --   --   --   --   --  104 111* 80 103 123*   HCO3  --   --   --   --   --  27 26 27 22 21   O2PER 21 48,769 99 21   < > 40  " 40 40 40  40 40.0 40.0  40.0    < > = values in this interval not displayed.        Urine Studies     Recent Labs   Lab Test 10/12/23  0258 07/28/23  1226 09/28/21  0926 09/25/20  1241 09/22/20  1448   URINEPH 5.0 5.5 6.5 5.5 5.0   NITRITE Negative Negative Negative Negative Negative   LEUKEST Negative Negative Negative Negative Negative   WBCU 3 <1 0 0 1       Vancomycin Levels     No lab results found.    Invalid input(s): \"VANCO\"    Tobramycin levels     No lab results found.    Gentamicin levels    No lab results found.    Tacrolimus levels    Invalid input(s): \"TACROLIMUS\", \"TAC\", \"TACR\"      Latest Ref Rng & Units 10/13/2023     6:09 AM 10/12/2023    10:19 PM 10/12/2023     2:33 PM 10/12/2023     6:36 AM 10/12/2023     6:05 AM   Transplant Immunosuppression Labs   Creat 0.67 - 1.17 mg/dL 3.64  3.96  3.33  4.59     Urea Nitrogen 8.0 - 23.0 mg/dL 85.8  85.0  68.8  83.9     WBC 4.0 - 11.0 10e3/uL 3.8     3.9    Neutrophil % 66     72        Cyclosporine levels    Invalid input(s): \"CYCLOSPORINE\", \"CYC\"    Mycophenolate levels    Invalid input(s): \"MYPA\", \"MYP\"    Sirolimus levels    Invalid input(s): \"SIROLIMUS\", \"SIR\", \"RAPA\"    CSF testing   No lab results found.      Microbiology:  10/12/23 Enteric Panel positive for EAEC and norovirus     Last check of C difficile  C Difficile Toxin B by PCR   Date Value Ref Range Status   10/11/2023 Positive (A) Negative Final     Comment:     Detection of C. difficile nucleic acid in stools confirms the presence of these organisms in diarrheal patients but may not indicate that C. difficile is the etiologic agent of the diarrhea. Results from the Xpert C. difficile assay should be interpreted in conjunction with other laboratory and clinical data available to the clinician.    Patients with a positive C. difficile PCR result will receive reflex GDH/Toxin Immunoassay testing. Please interpret the PCR test result in conjunction with GDH/Toxin Immunoassay results and the " "clinical status of patient.   - GDH antigen positive, toxin negative     Virology:  Covid 19 - (10/11/23)     No results found for: \"H6RES\"    EBV DNA Copies/mL   Date Value Ref Range Status   10/12/2023 Not Detected Not Detected copies/mL Final   07/17/2023 Not Detected Not Detected copies/mL Final   02/14/2023 Not Detected Not Detected copies/mL Final   02/02/2023 <500 (A) Not Detected copies/mL Final     Comment:     EBV DNA Detected below the reportable range of 500 copies/mL   07/19/2021 Not Detected Not Detected copies/mL Final   05/11/2021 EBV DNA Not Detected EBVNEG^EBV DNA Not Detected [Copies]/mL Final   01/05/2021 EBV DNA Not Detected EBVNEG^EBV DNA Not Detected [Copies]/mL Final   10/12/2020 EBV DNA Not Detected EBVNEG^EBV DNA Not Detected [Copies]/mL Final   08/17/2020 766 (A) EBVNEG^EBV DNA Not Detected [Copies]/mL Final       BK viral loads No lab results found.      Imaging:  10/11/23 CT Abdomen Pelvis  1. No evidence of colitis or enteritis.  2. Liquid consistency stool in the colon with minimal fecalization  consistent with patient's history of diarrhea.  3. Cholelithiasis without evidence for acute cholecystitis.  4. Numerous jejunal diverticula without inflammation         ECHO:  7/17/23:  Global and regional left ventricular function is normal with an EF of 55-60%.  Right ventricular function, chamber size, wall motion, and thickness are  normal.  Pulmonary artery systolic pressure cannot be assessed.  The inferior vena cava is normal.  No pericardial effusion is present.  No significant changes noted.      "

## 2023-10-13 NOTE — PLAN OF CARE
Major Shift Events: AVSS on RA except x1 BP this AM 82/56 w/ recheck 95/58. SR w/ BBB. A&O x4. Getting up independently in room to bathroom, patient's wife assisting. Tolerating small amounts of low lactose diet. Denies nausea. X5 loose BMs this shift. BG checks ACHS. Voiding spont, needs reminders to save urine. X1 PIV SL, x1 PIV w/ MIVF infusing. Mag replaced, recheck sched for this afternoon.  Plan: Cont to monitor GI symptoms. Cont w/ POC.  For vital signs and complete assessments, please see documentation flowsheets.

## 2023-10-13 NOTE — PLAN OF CARE
"Status 2045-0700    /60 (BP Location: Left arm)   Pulse 94   Temp 98.6  F (37  C) (Oral)   Resp 13   Ht 1.702 m (5' 7\")   Wt 73 kg (160 lb 14.4 oz)   SpO2 99%   BMI 25.20 kg/m      Neuro: A&O x4, primarily Urdu speaking, however understands some English and able to make needs known.  Respiratory: WDL  Cardiac: SR-ST on tele.  GI/: Loose BM x1 overnight. Voiding spontaneously.   Skin: No abnormalities noted.  VS: VSS, afebrile.  LDA:  PIV x2, D5W+Kcl+NaBicarb infusing at 100mL/hr.   Pain: No complaints of pain.  Tests/procedures: n/a  Mobility: SBA in room. Bed alarm on for pt safety.    Plan: Continue with plan of care, report changes to Cards 2.    "

## 2023-10-14 LAB
ANION GAP SERPL CALCULATED.3IONS-SCNC: 12 MMOL/L (ref 7–15)
ANION GAP SERPL CALCULATED.3IONS-SCNC: 12 MMOL/L (ref 7–15)
ANION GAP SERPL CALCULATED.3IONS-SCNC: 13 MMOL/L (ref 7–15)
BASE EXCESS BLDV CALC-SCNC: 2.5 MMOL/L (ref -7.7–1.9)
BASO+EOS+MONOS # BLD AUTO: ABNORMAL 10*3/UL
BASO+EOS+MONOS NFR BLD AUTO: ABNORMAL %
BASOPHILS # BLD AUTO: 0 10E3/UL (ref 0–0.2)
BASOPHILS NFR BLD AUTO: 0 %
BUN SERPL-MCNC: 55.3 MG/DL (ref 8–23)
BUN SERPL-MCNC: 60.6 MG/DL (ref 8–23)
BUN SERPL-MCNC: 63.6 MG/DL (ref 8–23)
CALCIUM SERPL-MCNC: 8.3 MG/DL (ref 8.8–10.2)
CALCIUM SERPL-MCNC: 8.3 MG/DL (ref 8.8–10.2)
CALCIUM SERPL-MCNC: 8.6 MG/DL (ref 8.8–10.2)
CHLORIDE SERPL-SCNC: 105 MMOL/L (ref 98–107)
CHLORIDE SERPL-SCNC: 105 MMOL/L (ref 98–107)
CHLORIDE SERPL-SCNC: 106 MMOL/L (ref 98–107)
CREAT SERPL-MCNC: 2.52 MG/DL (ref 0.67–1.17)
CREAT SERPL-MCNC: 2.57 MG/DL (ref 0.67–1.17)
CREAT SERPL-MCNC: 2.63 MG/DL (ref 0.67–1.17)
DEPRECATED HCO3 PLAS-SCNC: 22 MMOL/L (ref 22–29)
DEPRECATED HCO3 PLAS-SCNC: 24 MMOL/L (ref 22–29)
DEPRECATED HCO3 PLAS-SCNC: 24 MMOL/L (ref 22–29)
EGFRCR SERPLBLD CKD-EPI 2021: 26 ML/MIN/1.73M2
EGFRCR SERPLBLD CKD-EPI 2021: 26 ML/MIN/1.73M2
EGFRCR SERPLBLD CKD-EPI 2021: 27 ML/MIN/1.73M2
EOSINOPHIL # BLD AUTO: 0.1 10E3/UL (ref 0–0.7)
EOSINOPHIL NFR BLD AUTO: 2 %
ERYTHROCYTE [DISTWIDTH] IN BLOOD BY AUTOMATED COUNT: 14.5 % (ref 10–15)
GLUCOSE BLDC GLUCOMTR-MCNC: 134 MG/DL (ref 70–99)
GLUCOSE BLDC GLUCOMTR-MCNC: 135 MG/DL (ref 70–99)
GLUCOSE BLDC GLUCOMTR-MCNC: 153 MG/DL (ref 70–99)
GLUCOSE BLDC GLUCOMTR-MCNC: 166 MG/DL (ref 70–99)
GLUCOSE BLDC GLUCOMTR-MCNC: 188 MG/DL (ref 70–99)
GLUCOSE SERPL-MCNC: 137 MG/DL (ref 70–99)
GLUCOSE SERPL-MCNC: 173 MG/DL (ref 70–99)
GLUCOSE SERPL-MCNC: 182 MG/DL (ref 70–99)
HCO3 BLDV-SCNC: 27 MMOL/L (ref 21–28)
HCT VFR BLD AUTO: 25.8 % (ref 40–53)
HGB BLD-MCNC: 8.5 G/DL (ref 13.3–17.7)
IMM GRANULOCYTES # BLD: 0 10E3/UL
IMM GRANULOCYTES NFR BLD: 0 %
LYMPHOCYTES # BLD AUTO: 0.7 10E3/UL (ref 0.8–5.3)
LYMPHOCYTES NFR BLD AUTO: 23 %
MAGNESIUM SERPL-MCNC: 1.9 MG/DL (ref 1.7–2.3)
MCH RBC QN AUTO: 27.2 PG (ref 26.5–33)
MCHC RBC AUTO-ENTMCNC: 32.9 G/DL (ref 31.5–36.5)
MCV RBC AUTO: 82 FL (ref 78–100)
MONOCYTES # BLD AUTO: 0.4 10E3/UL (ref 0–1.3)
MONOCYTES NFR BLD AUTO: 11 %
NEUTROPHILS # BLD AUTO: 2 10E3/UL (ref 1.6–8.3)
NEUTROPHILS NFR BLD AUTO: 64 %
NRBC # BLD AUTO: 0 10E3/UL
NRBC BLD AUTO-RTO: 0 /100
O2/TOTAL GAS SETTING VFR VENT: 21 %
PCO2 BLDV: 41 MM HG (ref 40–50)
PH BLDV: 7.43 [PH] (ref 7.32–7.43)
PLATELET # BLD AUTO: 126 10E3/UL (ref 150–450)
PO2 BLDV: 45 MM HG (ref 25–47)
POTASSIUM SERPL-SCNC: 4.7 MMOL/L (ref 3.4–5.3)
POTASSIUM SERPL-SCNC: 4.7 MMOL/L (ref 3.4–5.3)
POTASSIUM SERPL-SCNC: 5.3 MMOL/L (ref 3.4–5.3)
RBC # BLD AUTO: 3.13 10E6/UL (ref 4.4–5.9)
SODIUM SERPL-SCNC: 140 MMOL/L (ref 135–145)
SODIUM SERPL-SCNC: 141 MMOL/L (ref 135–145)
SODIUM SERPL-SCNC: 142 MMOL/L (ref 135–145)
WBC # BLD AUTO: 3.2 10E3/UL (ref 4–11)

## 2023-10-14 PROCEDURE — 99233 SBSQ HOSP IP/OBS HIGH 50: CPT | Performed by: INTERNAL MEDICINE

## 2023-10-14 PROCEDURE — 94640 AIRWAY INHALATION TREATMENT: CPT

## 2023-10-14 PROCEDURE — 999N000157 HC STATISTIC RCP TIME EA 10 MIN

## 2023-10-14 PROCEDURE — 258N000003 HC RX IP 258 OP 636: Performed by: INTERNAL MEDICINE

## 2023-10-14 PROCEDURE — 250N000009 HC RX 250: Performed by: STUDENT IN AN ORGANIZED HEALTH CARE EDUCATION/TRAINING PROGRAM

## 2023-10-14 PROCEDURE — 83735 ASSAY OF MAGNESIUM: CPT

## 2023-10-14 PROCEDURE — 80048 BASIC METABOLIC PNL TOTAL CA: CPT

## 2023-10-14 PROCEDURE — 94642 AEROSOL INHALATION TREATMENT: CPT

## 2023-10-14 PROCEDURE — 85025 COMPLETE CBC W/AUTO DIFF WBC: CPT

## 2023-10-14 PROCEDURE — 36415 COLL VENOUS BLD VENIPUNCTURE: CPT

## 2023-10-14 PROCEDURE — 250N000012 HC RX MED GY IP 250 OP 636 PS 637

## 2023-10-14 PROCEDURE — 250N000011 HC RX IP 250 OP 636: Mod: JZ | Performed by: INTERNAL MEDICINE

## 2023-10-14 PROCEDURE — 82803 BLOOD GASES ANY COMBINATION: CPT

## 2023-10-14 PROCEDURE — 250N000009 HC RX 250: Performed by: INTERNAL MEDICINE

## 2023-10-14 PROCEDURE — 214N000001 HC R&B CCU UMMC

## 2023-10-14 PROCEDURE — 250N000013 HC RX MED GY IP 250 OP 250 PS 637: Performed by: INTERNAL MEDICINE

## 2023-10-14 PROCEDURE — 99233 SBSQ HOSP IP/OBS HIGH 50: CPT | Mod: GC | Performed by: INTERNAL MEDICINE

## 2023-10-14 PROCEDURE — 250N000013 HC RX MED GY IP 250 OP 250 PS 637: Performed by: STUDENT IN AN ORGANIZED HEALTH CARE EDUCATION/TRAINING PROGRAM

## 2023-10-14 RX ORDER — PENTAMIDINE ISETHIONATE 300 MG/300MG
300 INHALANT RESPIRATORY (INHALATION) ONCE
Qty: 300 MG | Refills: 0 | Status: COMPLETED | OUTPATIENT
Start: 2023-10-14 | End: 2023-10-14

## 2023-10-14 RX ORDER — ALBUTEROL SULFATE 0.83 MG/ML
2.5 SOLUTION RESPIRATORY (INHALATION) ONCE
Qty: 3 ML | Refills: 0 | Status: COMPLETED | OUTPATIENT
Start: 2023-10-14 | End: 2023-10-14

## 2023-10-14 RX ORDER — AZITHROMYCIN 250 MG/1
500 TABLET, FILM COATED ORAL DAILY
Qty: 14 TABLET | Refills: 0 | Status: DISCONTINUED | OUTPATIENT
Start: 2023-10-15 | End: 2023-10-15 | Stop reason: HOSPADM

## 2023-10-14 RX ADMIN — PREDNISOLONE ACETATE 1 DROP: 10 SUSPENSION/ DROPS OPHTHALMIC at 08:10

## 2023-10-14 RX ADMIN — PENTAMIDINE ISETHIONATE 300 MG: 300 INHALANT RESPIRATORY (INHALATION) at 14:30

## 2023-10-14 RX ADMIN — KETOROLAC TROMETHAMINE 1 DROP: 5 SOLUTION OPHTHALMIC at 08:10

## 2023-10-14 RX ADMIN — ROSUVASTATIN CALCIUM 10 MG: 10 TABLET, FILM COATED ORAL at 08:09

## 2023-10-14 RX ADMIN — POTASSIUM CHLORIDE: 2 INJECTION, SOLUTION, CONCENTRATE INTRAVENOUS at 01:29

## 2023-10-14 RX ADMIN — SIROLIMUS 3 MG: 2 TABLET ORAL at 08:09

## 2023-10-14 RX ADMIN — POTASSIUM CHLORIDE 60 MEQ: 1.5 SOLUTION ORAL at 08:09

## 2023-10-14 RX ADMIN — INSULIN ASPART 1 UNITS: 100 INJECTION, SOLUTION INTRAVENOUS; SUBCUTANEOUS at 17:01

## 2023-10-14 RX ADMIN — INSULIN HUMAN 6 UNITS: 100 INJECTION, SUSPENSION SUBCUTANEOUS at 22:52

## 2023-10-14 RX ADMIN — ALBUTEROL SULFATE 2.5 MG: 2.5 SOLUTION RESPIRATORY (INHALATION) at 14:29

## 2023-10-14 RX ADMIN — MYCOPHENOLATE MOFETIL 1000 MG: 500 TABLET, FILM COATED ORAL at 08:09

## 2023-10-14 RX ADMIN — INSULIN ASPART 1 UNITS: 100 INJECTION, SOLUTION INTRAVENOUS; SUBCUTANEOUS at 08:11

## 2023-10-14 RX ADMIN — MYCOPHENOLATE MOFETIL 1000 MG: 500 TABLET, FILM COATED ORAL at 20:05

## 2023-10-14 RX ADMIN — AZITHROMYCIN 500 MG: 250 TABLET, FILM COATED ORAL at 08:09

## 2023-10-14 ASSESSMENT — ACTIVITIES OF DAILY LIVING (ADL)
ADLS_ACUITY_SCORE: 24

## 2023-10-14 NOTE — PROGRESS NOTES
Temp: 98.3  F (36.8  C) Temp src: Oral BP: 104/61 Pulse: 96   Resp: 15 SpO2: 99 % O2 Device: None (Room air)        SHIFT: [id number]      Neuro: A&O x4, calls appropriately, able to make needs known. Able to sleep most of shift.   Respiratory: WDL on RA. Denies SOB.   Cardiac: SR-ST on tele. Denies chest pain.   GI/: Loose BM x1 overnight per pt. Voiding spontaneously.   Skin: No abnormalities noted.  LDA:  PIV x2, D5W+Kcl+NaBicarb infusing at 100mL/hr.   Pain: No complaints of pain.  Activity: SBA.      Plan: Continue with plan of care, report changes to Cards 2.

## 2023-10-14 NOTE — PROGRESS NOTES
Neuro: A&Ox4.   Cardiac: SR. VSS.   Respiratory: Sating adequate on RA.  GI/: Adequate urine output. Multiple episodes of diarrhea  Diet/appetite: Tolerating regular diet. Eating well.  Activity:  Assist of stand by, up to chair and to bathroom.  Pain: At acceptable level on current regimen.   Skin: No new deficits noted.  LDA's: pivX2    Plan: Continue with POC. Notify primary team with changes.

## 2023-10-14 NOTE — PROGRESS NOTES
Nephrology Progress Note  10/14/2023         Assessment & Recommendations:   Mr. Lucian Oliveira is a 67 year old male with a history of DMII, HTN, HLD, CAD s/p CABG with ICM now s/p OHT (2020), CKDIII who presented to the ED with ~3 weeks of worsening diarrhea, dizziness, and headache. Nephrology was consulted for DAYA on CKD.      #DAYA  Baseline Scr appears to be ~1.2-1.7 prior to transplant. Fluctuated between 1.7 and 2.5 over the last year. Elevated to 5.77 on admission. UA with mild proteinuria otherwise bland. Likely 2/2 to volume depletion. Renal US 6/13 normal renal architecture with no hydronephrosis. Patient with UOP less concerning for obstructive etiology. Scr improved today but still significantly elevated.     #CKD, stage 3b  See above. Long standing chronic kidney disease likely CNI toxicity plus ASVD. On chronic immunosuppressants with known renal toxicity. Was previously taking tacrolimus but stopped and switched to mycophenolate and rapamycin in 7/2023 due to worsening renal function. Renal US 6/13 normal. Has DMII not well controlled. Last A1C (7/17/23) 8.7. Seen by nephrology OP 10/10/2023 in clinic at request of patient's cardiologist Dr. Sheridan.     #HAGMA  #NAGMA  Serum bicarb 9 on admission. Anion gap 22. Lactate within normal limits, ketones negative. HAGMA likely 2/2 to chronic kidney disease. Also likely concurrent normal anion gap metabolic acidosis given diarrhea and lisinopril likely contributed to the DAYA.     -continue with sodium bicarb 100 mEq in D5W with Kcl   -May give NS bolus for BP support      #Hypotension, improving  Blood pressure 87/55 on admission improved with IV fluids. Blood pressure at home in the 120-130s. On lisinopril and furosemide for chronic hypertension which is currently on hold.   -agree with holding lisinopril and furosemide until resolution of diarrhea     #Volume Status:   Appears euvolemic on exam. No edema. Admission wt 74.8 kg down from reported  "baseline of ~79 kg.   -Continue with volume repletion     #Diarrhea  3 weeks of watery diarrhea. Stool culture positive for norovirus, enteroaggregative ecoli. C. Diff PCR positive, toxin negative. Likely infectious etiology vs mycophenolate toxicity. MPA level ~180.   - Tx ID recommended azithromycin for EAEC   - agree reducing MMF dose         Recommendations were communicated to primary team via note.     Seen and discussed with Dr. Jillian Whatley MD  Nephrology fellow  [phone]       Interval History :   Nursing and provider notes from last 24 hours reviewed.  Pt continues to have multiple BM (7 in the past 24 hours). So far this AM, he reports 3 BMs, watery.     Review of Systems:   I reviewed the following systems:  GI: Normal appetite. Denies nausea or vomiting. Continued diarrhea.   Neuro:  No confusion  Constitutional:  Denies fever or chills  CV: Denies dyspnea or edema.  Denies chest pain.    Physical Exam:   I/O last 3 completed shifts:  In: 4203.33 [P.O.:720; I.V.:3483.33]  Out: 1350 [Urine:1350]   /64 (BP Location: Left arm)   Pulse 99   Temp 97.8  F (36.6  C) (Oral)   Resp 16   Ht 1.702 m (5' 7\")   Wt 73 kg (160 lb 14.4 oz)   SpO2 100%   BMI 25.20 kg/m       GENERAL APPEARANCE: In no acute distress, awake sitting up in bed eating breakfast. Pleasant and conversational.   EYES: No scleral icterus, pupils equal  Pulmonary: lungs clear to auscultation with equal breath sounds bilaterally, no clubbing  CV: regular rhythm, normal rate, no rub   - JVD none   - Edema none present in bilateral LE  GI: soft, nontender, normal bowel sounds  MS: no evidence of inflammation in joints, no muscle tenderness  : no villalobos  SKIN: no rash, warm, dry, no cyanosis  NEURO: face symmetric. A&O x3. No deficits grossly.  Access: PIV R forearm    Labs:   All labs reviewed by me  Electrolytes/Renal -   Recent Labs   Lab Test 10/14/23  0802 10/14/23  0732 10/14/23  0309 10/13/23  2222 10/13/23  0101 " 10/13/23  1514 10/13/23  0824 10/13/23  0609 10/12/23  1651 10/12/23  1433 10/11/23  1656 10/11/23  1128   NA  --  141  --  138  --  138  --  135   < > 135   < > 130*   POTASSIUM  --  4.7  --  4.4  --  4.8  --  3.9   < > 2.9*   < > 4.1   CHLORIDE  --  105  --  106  --  106  --  105   < > 111*   < > 99   CO2  --  24  --  21*  --  15*  --  15*   < > 9*   < > 9*   BUN  --  63.6*  --  67.3*  --  80.5*  --  85.8*   < > 68.8*   < > 84.8*   CR  --  2.63*  --  2.89*  --  3.20*  --  3.64*   < > 3.33*   < > 5.77*   * 182* 135* 156*   < > 159*   < > 128*   < > 136*   < > 192*   BRYANNA  --  8.3*  --  8.2*  --  7.9*  --  7.9*   < > 6.2*   < > 8.6*   MAG  --  1.9  --   --   --  2.1  --  1.5*  --   --    < > 1.8   PHOS  --   --   --   --   --   --   --  3.6  --  3.8  --  5.9*    < > = values in this interval not displayed.       CBC -   Recent Labs   Lab Test 10/14/23  0732 10/13/23  0609 10/12/23  0605   WBC 3.2* 3.8* 3.9*   HGB 8.5* 8.2* 8.6*   * 132* 130*       LFTs -   Recent Labs   Lab Test 10/11/23  1128 10/10/23  1011 07/17/23  0908 05/03/23  0844 02/02/23  1121   ALKPHOS 124  --  122  --  128   BILITOTAL 0.2  --  0.3  --  0.3   ALT 20  --  17  --  19   AST 22  --  24  --  23   PROTTOTAL 7.0  --  6.6  --  6.7   ALBUMIN 4.0 3.8 4.0   < > 4.0    < > = values in this interval not displayed.       Iron Panel -   Recent Labs   Lab Test 10/10/23  1011 07/28/23  1205 07/17/23  0908   IRON 90 80 56*   IRONSAT 38 35 24   SEVERIANO 236 190 123         Imaging:  All imaging studies reviewed by me.     Current Medications:   insulin aspart  1-7 Units Subcutaneous TID AC    insulin aspart  1-5 Units Subcutaneous At Bedtime    insulin NPH  20 Units Subcutaneous QAM AC    insulin NPH  6 Units Subcutaneous At Bedtime    ketorolac  1 drop Right Eye Daily    latanoprost  1 drop Both Eyes At Bedtime    [Held by provider] lisinopril  10 mg Oral BID    mycophenolate  1,000 mg Oral BID    potassium chloride  60 mEq Oral Daily     prednisoLONE acetate  1 drop Left Eye Daily    rosuvastatin  10 mg Oral Daily    sirolimus  3 mg Oral Daily    [Held by provider] sulfamethoxazole-trimethoprim  1 tablet Oral Once per day on Tuesday Thursday Saturday    [Held by provider] tamsulosin  0.4 mg Oral Daily      - MEDICATION INSTRUCTIONS -       Maegan Whatley MD  Nephrology fellow  [phone]

## 2023-10-14 NOTE — PROGRESS NOTES
[facility]    Cardiology Progress Note- Cardiology        Date of Admission:  10/11/2023     Assessment & Plan: HVSL   Lucian Oliveira is a 67 year old male with a PMHX significant for DMII, HLD, CKD statge 3, HTN, RUE DVT c/b HIT, and HFrEF 2/2 ICM s/p OHT 7/19/2020 admitted on 10/11 for management of DAYA and anion gap metabolic acidosis secondary to extra-renal loss from Norovirus/EAEC diarrhea.     Updates:   - Discontinue 150 mEq sodium bicarb drip with 40 mEq of potassium chloride; given improvement in acidosis  - Encourage PO intake  - Q12H BMP   - Continue 500mg PO azithromycin, 10 day course   - Scheduled Pentamidine for 10/15, given bactrim held for DAYA     #Anion Gap Metabolic Acidosis (gap closed)   #NAGM (Resolved)  #Norovirus Diarrhea  #Enteroaggregative E.Coli Diarrhea  HCO3 9 on admission, AG 22, VBG 7.12. Patient presented with 3x weeks of diarrhea, found to be C.Diff positive by PCR and antigen, negative toxin suggests chronic colonization. Admission CT Abdomen/Pelvis negative for colitis. Serum ketones collected given anion gap and within normal limits. Metabolic acidosis resolved with bicarbonate drip   - Encourage PO intake  - Continue Azithromycin 500mg x 10 day course   - Nephrology consulted, appreciate recs   - S/p 150 mEq sodium bicarb drip with 40 mEq of potassium chloride 10/11-10/14  - Continue oral potassium solution 60 mEq QD  - Added phosphorus lab, will replete for phosphorus greater than 2  - Transplant Infectious Diseases consulted, appreciate recs   - IgG>600, no indication for IVIG   - Decreased MMF given acute infection     Micro:  - Stool culture with Norovirus and EAEC  - CMV negative  - Blood cultures NGTD    #DAYA on CKD stage III, improving  Baseline Cr 1.5-1.7. On admission, Cr 5.77. BUN/Cr 14.55. Likely pre-renal given hx of diarrhea. Also possible to be d/t sirolimus toxicity; will check trough in AM.  Denies any  dysuria or hematuria. Patient still making urine. UA negative for infection. No hydronephrosis on CT Abdomen/Pelvis  - Encourage PO intake      #Chronic systolic HF 2/2 ICM now s/p OHT 7/19/2020  #Primary graft dysfunction, resolved  Postoperative course c/b primary graft dysfunction on POD1, EF 20-25% and severe RV dysfunction felt secondary to prolonged ischemic time. Graft function returned to normal on POD 6. Tachycardic at baseline.     Primary Cardiologist: Dr. Sheridan   Graft Function:   --Volume: PTA lasixs 20mg every day; will hold given c/f hypovolemia    - Admission weight: 165     Immunosuppression:   - Prednisone: none   --Continue Mycophenolate 1500 mg BID  --Continue Sirolimus 3mg every day               - Sirolimus level at 5.1, no changes at this time     Serostatus: CMV: D+/R+, EBV: D+/R+, Toxo: D+/R-     Prophylaxis:   --CAV: ASA 81, continue rosuvastatin 20 mg daily              - Cardiac Cath on 7/17/2023: no angiographic CAD, similar to prior   --Thrush: None    --PCP: Bactrim 400-80mg, M/W/F; Hold given diarrhea/magali --> start pentamidine 10/15   --GI: Protonix   --Osteoporosis: calcium/vitamin D   --CMV: None   --Toxo: Bactrim lifelong given D+ ; Hold given diarrhea/magali --> start pentamidine 10/15      Routine Surveillance:   - Last RHC with biopsy: 7/17/2023; elevated R and L sided filling pressures with mild PH and persevered CO (RA 14mmHg, RV 38/14 mmHg, PCWP 22mmHg, Jacy CO/CI 6.4/3.4, PVR 1.24 pendleton,  dynes)  - ECHO: 7/17/2023; normal LVF with EF 55-60%, no regional wall motion abnormalities.      #Poorly controlled T2DM on insulin   Recent A1C 8.7. Now insulin dependent and basal-bolus insulin.  Home regimen Trulicity 4.5 mg subcutaneous once a week, Humulin NPH 40 units in am and 12 units in pm, Humalog 12-14 with meal, Jardiance 10 mg daily.   - Hypoglycemic protocol  - Basal NPH 20U qAM on 6U qPM; Medium sliding scale insulin   - Hold Jardiance 10mg every day   - Hold Trulicity  4.5mg SubQ injection Qweek      #Vitreous hemorrhage, R eye (4/2021)   #Proliferative Diabetic Retinopathy, left eye   #Ocular HTN, bilateral eyes   Last evaluated by ophthalmology on 7/27/2023.   - On Bevacizumab last dose on 7/27/2023  - Continue latanoprost at bedtime both eyes   - Continue  prednisolone 1x daily left eye  - Continue Ketorolac 1x daily right eye      #Hypertension   - Hold PTA lisinopril 10mg every day, in setting of DAYA and hypotension     #Chronic anemia   #Iron deficency anemia  - Hold ferrous 325mg supplementation given acute infection   - Daily CBC     # Hyponatremia resolved  Na 132 on admission. Likely hypovolemic hyponatremia due to extra-renal losses given diarrhea. Resolved after bicarbonate drip.   - Encourage PO intake     #RIJ and RUE DVT, provoked   #+HIT  US 7/22/20 nonocclusive thrombus in the right internal jugular vein along the intravenous catheter, nonocclusive thrombus along the right PICC line in the right axillary vein demonstrated, and occlusive thrombus in the superficial right cephalic vein demonstrated as above. +HIT and started on fondaparinux. Treated with Fondaparinux for 3 months and was followed by [facility] as an outpatient.      #Urinary retention   - Hold PTA tamsulosin given DAYA     #Donor Streptococcus salivarius bacteremia  Post-transplant was treated with ceftriaxone.       Diet: Low lactose diet  DVT Prophylaxis: Not indicated  Calderon Catheter: Not present  Cardiac Monitoring: ACTIVE order. Indication: Acute decompensated heart failure (48 hours)  Code Status: Full Code             Diet: Low Lactose Diet    DVT Prophylaxis: Low Risk/Ambulatory with no VTE prophylaxis indicated  Calderon Catheter: Not present  Cardiac Monitoring: None  Code Status: Full Code          Clinically Significant Risk Factors        # Hypokalemia: Lowest K = 2.9 mmol/L in last 2 days, will replace as needed     # Hypomagnesemia: Lowest Mg = 1.5 mg/dL in last 2 days, will replace as needed    "    # Hypertension: Noted on problem list  # Chronic heart failure with preserved ejection fraction: heart failure noted on problem list and last echo with EF >50%      # DMII: A1C = 8.7 % (Ref range: <5.7 %) within past 6 months, PRESENT ON ADMISSION  # Overweight: Estimated body mass index is 25.2 kg/m  as calculated from the following:    Height as of this encounter: 1.702 m (5' 7\").    Weight as of this encounter: 73 kg (160 lb 14.4 oz)., PRESENT ON ADMISSION            Disposition Plan   Expected discharge: Tomorrow, recommended to prior living arrangement once electrolytes resolve.       Entered: Demetra Moran MD 10/14/2023, 7:27 AM   The patient's care was discussed with the Attending Physician, Dr. Holder .      Demetra Moran MD  [facility]  ______________________________________________________________________    Interval History   Nursing notes reviewed. No acute events overnight. Patient reports that he feels he is doing better. His diarrhea has improved. He was able to walk around and was not dizzy this AM. No fever, sob, chills, chest pain, or arthralgias.       Intake/Output Summary (Last 24 hours) at 10/14/2023 1412  Last data filed at 10/14/2023 1100  Gross per 24 hour   Intake 2420 ml   Output 1650 ml   Net 770 ml      Vitals:    10/11/23 2055 10/12/23 2046 10/13/23 0320 10/14/23 0500   Weight: 74.8 kg (165 lb) 73.1 kg (161 lb 3.2 oz) 73 kg (160 lb 14.4 oz) 73 kg (160 lb 14.4 oz)        Physical Exam   Vital Signs: Temp: 98.3  F (36.8  C) Temp src: Oral BP: 104/61 Pulse: 96   Resp: 15 SpO2: 99 % O2 Device: None (Room air)    Weight: 160 lbs 14.4 oz    General: NAD, sitting up in bed eating breakfast  HEENT: atraumatic, no ulceration or bleeding noted from mouth  Lymph: no lymphadenopathy appreciated at neck  Cardiac: Tachycardic, regular, normal S1 and S2, no murmurs appreciated  Lung: Clear bilaterally   Abdomen: No tenderness to " palpation  Extremities: No edema or cyanosis noted, warm with 2+ pulses  Neuro: No focal deficits noted, alert and oriented x3  Skin: No new rashes noted on exam          Medical Decision Making       Please see A&P for additional details of medical decision making.      Data     I have personally reviewed the following data over the past 24 hrs:    3.2 (L)  \   8.5 (L)   / 126 (L)     141 105 63.6 (H) /  134 (H)   4.7 24 2.63 (H) \       Imaging results reviewed over the past 24 hrs:   No results found for this or any previous visit (from the past 24 hour(s)).

## 2023-10-14 NOTE — DISCHARGE SUMMARY
[facility]    Cardiology Discharge Summary- Cardiology         Date of Admission:  10/11/2023  Date of Discharge:  10/15/2023  Discharging Provider: Dr. Hipolito Holder       Discharge Diagnoses   #Anion Gap Metabolic Acidosis (gap closed)   #NAGM (Resolved)  #Norovirus Diarrhea  #Enteroaggregative E.Coli Diarrhea  #DAYA on CKD stage III, improving   #Chronic systolic HF 2/2 ICM now s/p OHT 7/19/2020  #Primary graft dysfunction, resolved  #Poorly controlled T2DM on insulin   #Vitreous hemorrhage, R eye (4/2021)   #Proliferative Diabetic Retinopathy, left eye   #Ocular HTN, bilateral eyes   #Hypertension    #Chronic anemia   #Iron deficency anemia  # Hyponatremia resolved   #RIJ and RUE DVT, provoked   #+HIT  #Urinary retention    #Donor Streptococcus salivarius bacteremia     Follow-ups Needed After Discharge   Follow up your cardiologist, Dr. Arvin Sheridan within 7 days after discharge to discuss restarting medications  2.  Endocrinology, given recent hold of medications and poorly controlled A1C   3. Repeat labs within 2 days following discharge (BMP and CBC)  4. PCP referral sent given not established, follow up within 2-4 weeks    Unresulted Labs Ordered in the Past 30 Days of this Admission       Date and Time Order Name Status Description    10/11/2023 11:04 AM Blood Culture Arm, Right Preliminary     10/11/2023 11:04 AM Blood Culture Arm, Left Preliminary         These results will be followed up by Dr. Arvin Sheridan    Discharge Disposition   Discharged to home  Condition at discharge: Stable    Hospital Course   Lucian Oliveira is a 67 year old male with a PMHX significant for DMII, HLD, CKD statge 3, HTN, RUE DVT c/b HIT, and HFrEF 2/2 ICM s/p OHT 7/19/2020 admitted on 10/11 for management of DAYA and anion gap metabolic acidosis secondary to extra-renal loss from Norovirus/EAEC diarrhea which resolved after IV fluids.     #Anion Gap  Metabolic Acidosis (gap closed)   #NAGM (Resolved)  #Norovirus Diarrhea (improved)  #Enteroaggregative E.Coli Diarrhea (improved)  HCO3 9 on admission, AG 22, VBG 7.12. Patient presented with 3x weeks of diarrhea, found to be C.Diff positive by PCR and antigen, negative toxin suggests chronic colonization. Admission CT Abdomen/Pelvis negative for colitis. Serum ketones collected given anion gap and within normal limits. Metabolic acidosis resolved with bicarbonate drip. At time of discharge, bicarb was wnl and pH was 7.43.   - Encourage PO intake  - Nephrology consulted,    - S/p 150 mEq sodium bicarb drip with 40 mEq of potassium chloride 10/11-10/14  - Transplant Infectious Diseases consulted   - Azithromycin 500mg x 10 day course (end date 10/21)   - IgG>600, no indication for IVIG   - Decreased  MMF 1000mg BID  given acute infection, increase back to 1500mg BID at discharge   Micro:  - Stool culture with Norovirus and EAEC  - CMV negative  - Blood cultures NGTD    #DAYA on CKD stage III, improving  Baseline Cr 1.5-1.7. On admission, Cr 5.77. BUN/Cr 14.55. Likely pre-renal given hx of diarrhea. Also possible to be d/t sirolimus toxicity; will check trough in AM.  Denies any dysuria or hematuria. Patient still making urine. UA negative for infection. No hydronephrosis on CT Abdomen/Pelvis. At time of discharge Cr improved to 2.47.   - Repeat labs within 2 days of discharge   - Encourage PO intake      #Chronic systolic HF 2/2 ICM now s/p OHT 7/19/2020  #Primary graft dysfunction, resolved  Postoperative course c/b primary graft dysfunction on POD1, EF 20-25% and severe RV dysfunction felt secondary to prolonged ischemic time. Graft function returned to normal on POD 6. Tachycardic at baseline.     Primary Cardiologist: Dr. Sheridan   Graft Function:   --Volume: PTA lasixs 20mg every day; will hold given c/f hypovolemia and DAYA   - Admission weight: 165     Immunosuppression:   - Prednisone: none   --Continue  Mycophenolate 1500 mg BID--> Decreased during hospitalization to 1000mg BID given acute infection, at discharge restart 1500mg BID  --Continue Sirolimus 3mg every day               - Sirolimus level at 5.1, no changes at this time     Serostatus: CMV: D+/R+, EBV: D+/R+, Toxo: D+/R-     Prophylaxis:   --CAV: ASA 81, continue rosuvastatin 20 mg daily              - Cardiac Cath on 7/17/2023: no angiographic CAD, similar to prior   --Thrush: None    --PCP: Bactrim 400-80mg, M/W/F; Hold given diarrhea/magali --> s/p 1x inhaled pentamidine 10/15; patient to discuss with cardiologist restarting Bactrim as an outpatient when appropriate  --GI: Protonix   --Osteoporosis: calcium/vitamin D   --CMV: None   --Toxo: Bactrim lifelong given D+ ; Hold given diarrhea/magali --> s/p 1x inhaled pentamidine 10/15; patient to discuss with cardiologist restarting Bactrim as an outpatient when appropriate     Routine Surveillance:   - Last RHC with biopsy: 7/17/2023; elevated R and L sided filling pressures with mild PH and persevered CO (RA 14mmHg, RV 38/14 mmHg, PCWP 22mmHg, Jacy CO/CI 6.4/3.4, PVR 1.24 pendleton,  dynes)  - ECHO: 7/17/2023; normal LVF with EF 55-60%, no regional wall motion abnormalities.      #Poorly controlled T2DM on insulin   Recent A1C 8.7. Now insulin dependent and basal-bolus insulin.  Home regimen Trulicity 4.5 mg subcutaneous once a week, Humulin NPH 40 units in am and 12 units in pm, Humalog 12-14 with meal, Jardiance 10 mg daily.   - Hypoglycemic protocol  - Basal NPH 40U qAM on 12U qPM  - Hold Jardiance 10mg every day; restart pending resolution of diarrhea   - Hold Trulicity 4.5mg SubQ injection Qweek; restart at discharge given improvement in diarrhea  - Patient to follow up with Endocrinology as an outpatient      #Vitreous hemorrhage, R eye (4/2021)   #Proliferative Diabetic Retinopathy, left eye   #Ocular HTN, bilateral eyes   Last evaluated by ophthalmology on 7/27/2023.   - On Bevacizumab last dose on  7/27/2023  - Continue latanoprost at bedtime both eyes   - Continue  prednisolone 1x daily left eye  - Continue Ketorolac 1x daily right eye      #Hypertension   - Hold PTA lisinopril 10mg every day, in setting of DAYA and hypotension; okay to restart once diarrhea resolves, will discuss restart at outpatient follow up.      #Chronic anemia   #Iron deficency anemia  - Hold ferrous 325mg supplementation given acute infection/diarrhea; restart when infection resolves   - Daily CBC     # Hyponatremia resolved  Na 132 on admission. Likely hypovolemic hyponatremia due to extra-renal losses given diarrhea. Resolved after bicarbonate drip.   - Encourage PO intake     #RIJ and RUE DVT, provoked   #+HIT  US 7/22/20 nonocclusive thrombus in the right internal jugular vein along the intravenous catheter, nonocclusive thrombus along the right PICC line in the right axillary vein demonstrated, and occlusive thrombus in the superficial right cephalic vein demonstrated as above. +HIT and started on fondaparinux. Treated with Fondaparinux for 3 months and was followed by Mary[facility] as an outpatient.      #Urinary retention   - Hold PTA tamsulosin given DAYA; restart when diarrhea resolves     #Donor Streptococcus salivarius bacteremia  Post-transplant was treated with ceftriaxone.       Diet: Low lactose diet  DVT Prophylaxis: Not indicated  Calderon Catheter: Not present  Cardiac Monitoring: ACTIVE order. Indication: Acute decompensated heart failure (48 hours)  Code Status: Full Code            Consultations This Hospital Stay   NEPHROLOGY GENERAL ADULT IP CONSULT  INFECTIOUS DISEASE TRANSPLANT SOT ADULT IP CONSULT  SMOKING CESSATION PROGRAM IP CONSULT    Code Status   Full Code        Demetra Moran MD  [facility]  ______________________________________________________________________    Physical Exam   Vital Signs: Temp: 98.5  F (36.9  C) Temp src: Oral BP: 107/64 Pulse: 99   Resp: 19 SpO2:  92 % O2 Device: None (Room air)    Weight: 160 lbs 14.4 oz    General: NAD, sitting up in bed eating breakfast  HEENT: atraumatic, no ulceration or bleeding noted from mouth  Lymph: no lymphadenopathy appreciated at neck  Cardiac: Tachycardic, regular, normal S1 and S2, no murmurs appreciated  Lung: Clear bilaterally   Abdomen: No tenderness to palpation  Extremities: No edema or cyanosis noted, warm with 2+ pulses  Neuro: No focal deficits noted, alert and oriented x3  Skin: No new rashes noted on exam           Primary Care Physician   Physician No Ref-Primary    Discharge Orders   No discharge procedures on file.    Significant Results and Procedures   Most Recent 3 CBC's:  Recent Labs   Lab Test 10/14/23  0732 10/13/23  0609 10/12/23  0605   WBC 3.2* 3.8* 3.9*   HGB 8.5* 8.2* 8.6*   MCV 82 81 82   * 132* 130*     Most Recent 3 BMP's:  Recent Labs   Lab Test 10/14/23  1659 10/14/23  1409 10/14/23  1328 10/14/23  0802 10/14/23  0732 10/14/23  0309 10/13/23  2222   NA  --  142  --   --  141  --  138   POTASSIUM  --  5.3  --   --  4.7  --  4.4   CHLORIDE  --  105  --   --  105  --  106   CO2  --  24  --   --  24  --  21*   BUN  --  60.6*  --   --  63.6*  --  67.3*   CR  --  2.57*  --   --  2.63*  --  2.89*   ANIONGAP  --  13  --   --  12  --  11   BRYANNA  --  8.6*  --   --  8.3*  --  8.2*   * 137* 134*   < > 182*   < > 156*    < > = values in this interval not displayed.     7-Day Micro Results       Collected Updated Procedure Result Status      10/12/2023 0121 10/12/2023 0257 Enteric Bacteria and Virus Panel PCR [[id number]]    (Abnormal)   Stool from Per Rectum    Final result Component Value   Campylobacter species Negative   Salmonella species Negative   Vibrio species Negative   Vibrio cholerae Negative   Yersinia enterocolitica Negative   Enteropathogenic E. coli (EPEC) Negative   Shiga-like toxin-producing E. coli (STEC) Negative   Shigella/Enteroinvasive E. coli (EIEC) Negative    Cryptosporidium species Negative   Giardia lamblia Negative   Norovirus Gl/Gll Positive   Rotavirus A Negative   Plesiomonas shigelloides Negative   Enteroaggregative E. coli (EAEC) Positive   Enterotoxigenic E. coli (ETEC) Negative   E. coli O157 NA   Cyclospora cayetanensis Negative   Entamoeba histolytica Negative   Adenovirus F40/41 Negative   Astrovirus Negative   Sapovirus Negative            10/11/2023 1954 10/11/2023 2109 Asymptomatic COVID-19 Virus (Coronavirus) by PCR Nasopharyngeal [[id number]]    Swab from Nasopharyngeal    Final result Component Value   SARS CoV2 PCR Negative   NEGATIVE: SARS-CoV-2 (COVID-19) RNA not detected, presumed negative.            10/11/2023 1151 10/14/2023 1331 Blood Culture Arm, Right [[id number]]   Blood from Arm, Right    Preliminary result Component Value   Culture No growth after 3 days  [P]                10/11/2023 1131 10/12/2023 1427 Ova and Parasite Exam Routine [[id number]]    Stool from Per Rectum    Final result Component Value   OVA AND PARASITE EXAM Negative   A single negative specimen does not rule out parasitic infection.            10/11/2023 1131 10/11/2023 1341 C. difficile Toxin B PCR with reflex to C. difficile Antigen and Toxins A/B EIA [[id number]]    (Abnormal)   Stool from Per Rectum    Final result Component Value   C Difficile Toxin B by PCR Positive   Detection of C. difficile nucleic acid in stools confirms the presence of these organisms in diarrheal patients but may not indicate that C. difficile is the etiologic agent of the diarrhea. Results from the Xpert C. difficile assay should be interpreted in conjunction with other laboratory and clinical data available to the clinician.    Patients with a positive C. difficile PCR result will receive reflex GDH/Toxin Immunoassay testing. Please interpret the PCR test result in conjunction with GDH/Toxin Immunoassay results and the clinical status of patient.            10/11/2023 1131  10/11/2023 1416 C. difficile Antigen and Toxins A/B by Enzyme Immunoassay [[id number]]    (Abnormal)   Stool from Per Rectum    Final result Component Value   C. difficile GDH Antigen Positive   C. difficile Toxin Negative            10/11/2023 1128 10/14/2023 1331 Blood Culture Arm, Left [[id number]]   Blood from Arm, Left    Preliminary result Component Value   Culture No growth after 3 days  [P]                10/11/2023 1127 10/12/2023 1146 Cytomegalovirus DNA by PCR, Quantitative [[id number]]    Blood from Arm, Left    Final result Component Value Units   CMV DNA IU/mL Not Detected IU/mL                  Most Recent TSH and T4:  Recent Labs   Lab Test 08/05/20  0843   TSH 0.80     Most Recent Hemoglobin A1c:  Recent Labs   Lab Test 07/17/23  0908   A1C 8.7*   ,   Results for orders placed or performed during the hospital encounter of 10/11/23   CT Abdomen Pelvis w/o Contrast    Narrative    EXAMINATION: CT ABDOMEN PELVIS W/O CONTRAST, 10/11/2023 7:07 PM    TECHNIQUE:  Helical CT images from the lung bases through the  symphysis pubis were obtained without IV contrast and with oral  contrast. Contrast dose: 500 cc oral Omnipaque    COMPARISON: CT 9/22/2020    HISTORY: Pt with ICM s/p OHT (2020) now presenting with diarrhea --  eval for colitis.    FINDINGS:    Liver: Normal parenchymal attenuation without focal mass.  Biliary system: Cholelithiasis. No CT evidence for acute  cholecystitis.. No intrahepatic or extrahepatic biliary ductal  dilatation.  Pancreas: Mild fatty atrophy of the pancreas. No focal mass or  dilation of the main pancreatic duct.  Stomach: Within normal limits.  Spleen: Within normal limits.  Adrenal glands: Within normal limits.  Kidneys: Atrophic kidneys. No hydronephrosis, mass, or calculi..  Bladder: Partially distended and unremarkable.  Reproductive organs: Within normal limits.  Bowel: Normal caliber large and small bowel. Appendix unremarkable.  Liquid consistency stool in the  colon with minimal fecalization. No  bowel wall thickening or inflammatory changes to suggest colitis or  enteritis. There is several jejunal diverticula without surrounding  inflammatory change.  Lymph nodes: No intra-abdominal or pelvic lymphadenopathy.  Vasculature: Abdominal aorta is normal in caliber.  Peritoneum: No intraabdominal free fluid or air.     Chest: Heart size normal. No pericardial effusion. Small hiatal  hernia.. Distal thoracic aorta is unremarkable. No focal infiltrate.  Scattered sub-4 mm pulmonary nodules, for example the 2 mm solid  nodule in the peripheral right middle lobe on series 3 image 9.    Bones: No suspicious osseous lesion. Surgical changes of the chest.    Soft tissue: Bilateral gynecomastia.      Impression    IMPRESSION:   1. No evidence of colitis or enteritis.  2. Liquid consistency stool in the colon with minimal fecalization  consistent with patient's history of diarrhea.  3. Cholelithiasis without evidence for acute cholecystitis.  4. Numerous jejunal diverticula without inflammation    I have personally reviewed the examination and initial interpretation  and I agree with the findings.    NIKOLE DAVILA MD         SYSTEM ID:  [id number]     *Note: Due to a large number of results and/or encounters for the requested time period, some results have not been displayed. A complete set of results can be found in Results Review.       Discharge Medications   Current Discharge Medication List        CONTINUE these medications which have NOT CHANGED    Details   acetaminophen (TYLENOL) 325 MG tablet Take 3 tablets (975 mg) by mouth every 8 hours as needed for mild pain  Qty: 60 tablet, Refills: 3    Associated Diagnoses: Heart transplant, orthotopic, status (H)      aspirin (ASA) 81 MG chewable tablet Take 1 tablet (81 mg) by mouth daily  Qty: 120 tablet, Refills: 0    Associated Diagnoses: Heart transplant, orthotopic, status (H)      bisacodyl (DULCOLAX) 5 MG EC tablet 2 days  prior to procedure, take 2 tablets at 4 pm. 1 day prior to procedure, take 2 tablets at 4 pm. For additional instructions refer to your colonoscopy prep instructions.      Calcium Carb-Cholecalciferol (CALCIUM CARBONATE-VITAMIN D3) 600-400 MG-UNIT TABS TAKE ONE TABLET BY MOUTH TWICE A DAY WITH MEALS  Qty: 180 tablet, Refills: 3    Associated Diagnoses: Heart transplant, orthotopic, status (H)      Dulaglutide (TRULICITY) 4.5 MG/0.5ML SOPN Inject 4.5 mg Subcutaneous once a week  Qty: 6 mL, Refills: 1    Associated Diagnoses: Type 2 diabetes mellitus with hyperglycemia, with long-term current use of insulin (H)      empagliflozin (JARDIANCE) 10 MG TABS tablet Take 1 tablet (10 mg) by mouth daily  Qty: 90 tablet, Refills: 3    Associated Diagnoses: Heart replaced by transplant (H)      ferrous sulfate (FEROSUL) 325 (65 Fe) MG tablet Take 1 tablet (325 mg) by mouth daily (with breakfast)  Qty: 90 tablet, Refills: 3    Associated Diagnoses: Heart replaced by transplant (H)      furosemide (LASIX) 20 MG tablet Take 1 tablet (20 mg) by mouth daily  Qty: 30 tablet, Refills: 1    Associated Diagnoses: Heart replaced by transplant (H)      insulin lispro (HUMALOG KWIKPEN) 100 UNIT/ML (1 unit dial) KWIKPEN Inject 8-14 Units Subcutaneous 3 times daily (before meals)      insulin NPH (HUMULIN N KWIKPEN) 100 UNIT/ML injection GIve 40 units each morning and 12 at night subcutaneous  Qty: 45 mL, Refills: 1    Associated Diagnoses: 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, unspecified whether long term insulin use (H)      ketorolac (ACULAR) 0.5 % ophthalmic solution Place 1 drop into the right eye 2 times daily  Qty: 10 mL, Refills: 3    Associated Diagnoses: Aftercare following surgery of a sense organ      latanoprost (XALATAN) 0.005 % ophthalmic solution Place 1 drop into both eyes at bedtime      lisinopril (ZESTRIL) 10 MG tablet TAKE ONE TABLET BY MOUTH TWICE A DAY  Qty: 60 tablet,  Refills: 11    Associated Diagnoses: Heart transplant, orthotopic, status (H)      magnesium oxide 400 MG tablet Take 1 tablet (400 mg) by mouth 2 times daily  Qty: 180 tablet, Refills: 1    Associated Diagnoses: Heart replaced by transplant (H)      mycophenolate (GENERIC EQUIVALENT) 500 MG tablet Take 3 tablets (1,500 mg) by mouth 2 times daily  Qty: 180 tablet, Refills: 11    Comments: TXP DT 7/19/2020 (Heart) TXP Dischg DT 8/3/2020 DX Heart transplant Z94.1 TX Center [facility] [place] ([place], [place])  Associated Diagnoses: Heart replaced by transplant (H)      omega-3 acid ethyl esters (LOVAZA) 1 g capsule Take 1 capsule (1 g) by mouth daily  Qty: 90 capsule, Refills: 3    Associated Diagnoses: Type 2 diabetes mellitus with hyperglycemia, with long-term current use of insulin (H); Dyslipidemia; 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 Drink an 8 oz. glass of Golytely every 15 minutes until half of the jug is gone. Place remainder of Golytely in the refrigerator. 1 day prior at 5 pm, drink the 2nd half of a jug of Golytely bowel prep. 6 hours before your check-in time, drink an 8 oz. glass of Golytely every 15 minutes until half of the 2nd jug of Golytely is gone. Discard remainder of second jug.  Qty: 8000 mL, Refills: 0    Associated Diagnoses: Encounter for screening colonoscopy      prednisoLONE acetate (PRED FORTE) 1 % ophthalmic suspension Place 1 drop into the right eye 2 times daily  Qty: 10 mL, Refills: 3    Associated Diagnoses: Aftercare following surgery of a sense organ      rosuvastatin (CRESTOR) 20 MG tablet Take 1 tablet (20 mg) by mouth daily  Qty: 90 tablet, Refills: 3    Associated Diagnoses: Heart transplant, orthotopic, status (H)      senna-docusate (SENOKOT-S/PERICOLACE) 8.6-50 MG  tablet Take 1 tablet by mouth 2 times daily as needed (hold for loose stools)  Qty: 60 tablet, Refills: 3    Associated Diagnoses: Heart transplant, orthotopic, status (H)      sirolimus (GENERIC EQUIVALENT) 0.5 MG tablet Take 6 tablets (3 mg) by mouth daily  Qty: 180 tablet, Refills: 11    Comments: TXP DT 7/19/2020 (Heart) TXP Dischg DT 8/3/2020 DX Heart transplant Z94.1 TX Center [facility] ([place], [place]) Dose adjustment  Associated Diagnoses: Heart replaced by transplant (H)      sulfamethoxazole-trimethoprim (BACTRIM) 400-80 MG tablet Take 1 tablet by mouth three times a week In the AM  Qty: 39 tablet, Refills: 3    Associated Diagnoses: Heart transplant, orthotopic, status (H)      tamsulosin (FLOMAX) 0.4 MG capsule TAKE ONE CAPSULE BY MOUTH ONCE DAILY  Qty: 90 capsule, Refills: 3    Associated Diagnoses: Heart transplant, orthotopic, status (H)      Alcohol Swabs PADS Use to swab the area of the injection or sergio as directed   Per insurance coverage  Qty: 100 each, Refills: 0    Associated Diagnoses: Heart transplant, orthotopic, status (H)      blood glucose (NO BRAND SPECIFIED) test strip Use to test blood sugar 4 times daily or as directed.  Qty: 400 strip, Refills: 3    Associated Diagnoses: Type 2 diabetes mellitus with diabetic nephropathy, with long-term current use of insulin (H)      blood glucose monitoring (ACCU-CHEK FELIPE SMARTVIEW) meter device kit Use to test blood sugar 3-4 times daily, as directed.  Qty: 1 kit, Refills: 0    Associated Diagnoses: Type 2 diabetes mellitus with diabetic nephropathy, with long-term current use of insulin (H)      blood glucose monitoring (SOFTCLIX) lancets 1 each 4 times daily Use to test blood sugars 2 times daily.  Qty: 400 each, Refills: 8    Associated Diagnoses: Type 2 diabetes mellitus with diabetic nephropathy, with long-term current use of insulin (H)      insulin pen needle (32G X 4 MM) 32G X 4 MM miscellaneous  Use 5-6 pen needles daily or as directed.  Qty: 450 each, Refills: 3    Associated Diagnoses: Type 2 diabetes mellitus with hyperglycemia, with long-term current use of insulin (H)           Allergies   Allergies   Allergen Reactions    Heparin Heparin Induced Thrombocytopenia     HIT screen sent 7/18/2020. CORRINE confirmed positive

## 2023-10-15 VITALS
DIASTOLIC BLOOD PRESSURE: 60 MMHG | WEIGHT: 160.9 LBS | OXYGEN SATURATION: 100 % | RESPIRATION RATE: 16 BRPM | HEART RATE: 94 BPM | TEMPERATURE: 98.1 F | BODY MASS INDEX: 25.25 KG/M2 | HEIGHT: 67 IN | SYSTOLIC BLOOD PRESSURE: 123 MMHG

## 2023-10-15 LAB
ANION GAP SERPL CALCULATED.3IONS-SCNC: 14 MMOL/L (ref 7–15)
BASE EXCESS BLDV CALC-SCNC: -0.8 MMOL/L (ref -7.7–1.9)
BASO+EOS+MONOS # BLD AUTO: ABNORMAL 10*3/UL
BASO+EOS+MONOS NFR BLD AUTO: ABNORMAL %
BASOPHILS # BLD AUTO: 0 10E3/UL (ref 0–0.2)
BASOPHILS NFR BLD AUTO: 0 %
BUN SERPL-MCNC: 58.6 MG/DL (ref 8–23)
CALCIUM SERPL-MCNC: 8.5 MG/DL (ref 8.8–10.2)
CHLORIDE SERPL-SCNC: 105 MMOL/L (ref 98–107)
CREAT SERPL-MCNC: 2.47 MG/DL (ref 0.67–1.17)
DEPRECATED HCO3 PLAS-SCNC: 22 MMOL/L (ref 22–29)
EGFRCR SERPLBLD CKD-EPI 2021: 27 ML/MIN/1.73M2
EOSINOPHIL # BLD AUTO: 0.1 10E3/UL (ref 0–0.7)
EOSINOPHIL NFR BLD AUTO: 2 %
ERYTHROCYTE [DISTWIDTH] IN BLOOD BY AUTOMATED COUNT: 14.4 % (ref 10–15)
GLUCOSE BLDC GLUCOMTR-MCNC: 110 MG/DL (ref 70–99)
GLUCOSE BLDC GLUCOMTR-MCNC: 168 MG/DL (ref 70–99)
GLUCOSE SERPL-MCNC: 111 MG/DL (ref 70–99)
HCO3 BLDV-SCNC: 24 MMOL/L (ref 21–28)
HCT VFR BLD AUTO: 25.7 % (ref 40–53)
HGB BLD-MCNC: 8.4 G/DL (ref 13.3–17.7)
IMM GRANULOCYTES # BLD: 0 10E3/UL
IMM GRANULOCYTES NFR BLD: 0 %
LYMPHOCYTES # BLD AUTO: 0.8 10E3/UL (ref 0.8–5.3)
LYMPHOCYTES NFR BLD AUTO: 22 %
MAGNESIUM SERPL-MCNC: 1.9 MG/DL (ref 1.7–2.3)
MCH RBC QN AUTO: 27.2 PG (ref 26.5–33)
MCHC RBC AUTO-ENTMCNC: 32.7 G/DL (ref 31.5–36.5)
MCV RBC AUTO: 83 FL (ref 78–100)
MONOCYTES # BLD AUTO: 0.4 10E3/UL (ref 0–1.3)
MONOCYTES NFR BLD AUTO: 10 %
NEUTROPHILS # BLD AUTO: 2.4 10E3/UL (ref 1.6–8.3)
NEUTROPHILS NFR BLD AUTO: 66 %
NRBC # BLD AUTO: 0 10E3/UL
NRBC BLD AUTO-RTO: 0 /100
O2/TOTAL GAS SETTING VFR VENT: 0 %
PCO2 BLDV: 36 MM HG (ref 40–50)
PH BLDV: 7.43 [PH] (ref 7.32–7.43)
PLATELET # BLD AUTO: 134 10E3/UL (ref 150–450)
PO2 BLDV: 70 MM HG (ref 25–47)
POTASSIUM SERPL-SCNC: 4.5 MMOL/L (ref 3.4–5.3)
RBC # BLD AUTO: 3.09 10E6/UL (ref 4.4–5.9)
SODIUM SERPL-SCNC: 141 MMOL/L (ref 135–145)
WBC # BLD AUTO: 3.7 10E3/UL (ref 4–11)

## 2023-10-15 PROCEDURE — 250N000012 HC RX MED GY IP 250 OP 636 PS 637

## 2023-10-15 PROCEDURE — 36415 COLL VENOUS BLD VENIPUNCTURE: CPT

## 2023-10-15 PROCEDURE — 80048 BASIC METABOLIC PNL TOTAL CA: CPT

## 2023-10-15 PROCEDURE — 250N000013 HC RX MED GY IP 250 OP 250 PS 637

## 2023-10-15 PROCEDURE — 82803 BLOOD GASES ANY COMBINATION: CPT

## 2023-10-15 PROCEDURE — 83735 ASSAY OF MAGNESIUM: CPT

## 2023-10-15 PROCEDURE — 99239 HOSP IP/OBS DSCHRG MGMT >30: CPT | Mod: GC | Performed by: INTERNAL MEDICINE

## 2023-10-15 PROCEDURE — 250N000013 HC RX MED GY IP 250 OP 250 PS 637: Performed by: INTERNAL MEDICINE

## 2023-10-15 PROCEDURE — 99233 SBSQ HOSP IP/OBS HIGH 50: CPT | Performed by: INTERNAL MEDICINE

## 2023-10-15 PROCEDURE — 250N000013 HC RX MED GY IP 250 OP 250 PS 637: Performed by: STUDENT IN AN ORGANIZED HEALTH CARE EDUCATION/TRAINING PROGRAM

## 2023-10-15 PROCEDURE — 85025 COMPLETE CBC W/AUTO DIFF WBC: CPT

## 2023-10-15 RX ORDER — AZITHROMYCIN 500 MG/1
500 TABLET, FILM COATED ORAL DAILY
Qty: 14 TABLET | Refills: 0 | Status: SHIPPED | OUTPATIENT
Start: 2023-10-15 | End: 2023-10-25

## 2023-10-15 RX ORDER — MYCOPHENOLATE MOFETIL 500 MG/1
1000 TABLET ORAL 2 TIMES DAILY
Qty: 120 TABLET | Refills: 0 | Status: CANCELLED | OUTPATIENT
Start: 2023-10-15 | End: 2023-11-14

## 2023-10-15 RX ADMIN — PREDNISOLONE ACETATE 1 DROP: 10 SUSPENSION/ DROPS OPHTHALMIC at 08:43

## 2023-10-15 RX ADMIN — AZITHROMYCIN 500 MG: 250 TABLET, FILM COATED ORAL at 08:42

## 2023-10-15 RX ADMIN — POTASSIUM CHLORIDE 60 MEQ: 1.5 SOLUTION ORAL at 08:40

## 2023-10-15 RX ADMIN — MYCOPHENOLATE MOFETIL 1000 MG: 500 TABLET, FILM COATED ORAL at 08:42

## 2023-10-15 RX ADMIN — SIROLIMUS 3 MG: 2 TABLET ORAL at 08:42

## 2023-10-15 RX ADMIN — ROSUVASTATIN CALCIUM 10 MG: 10 TABLET, FILM COATED ORAL at 08:43

## 2023-10-15 RX ADMIN — KETOROLAC TROMETHAMINE 1 DROP: 5 SOLUTION OPHTHALMIC at 08:43

## 2023-10-15 ASSESSMENT — ACTIVITIES OF DAILY LIVING (ADL)
ADLS_ACUITY_SCORE: 24
FALL_HISTORY_WITHIN_LAST_SIX_MONTHS: NO
ADLS_ACUITY_SCORE: 24
ADLS_ACUITY_SCORE: 24
ADLS_ACUITY_SCORE: 20
WALKING_OR_CLIMBING_STAIRS_DIFFICULTY: NO
ADLS_ACUITY_SCORE: 24
ADLS_ACUITY_SCORE: 24
CHANGE_IN_FUNCTIONAL_STATUS_SINCE_ONSET_OF_CURRENT_ILLNESS/INJURY: NO
ADLS_ACUITY_SCORE: 24
DOING_ERRANDS_INDEPENDENTLY_DIFFICULTY: NO

## 2023-10-15 NOTE — PROGRESS NOTES
Nephrology Progress Note  10/15/2023         Assessment & Recommendations:   Mr. Lucian Oliveira is a 67 year old male with a history of DMII, HTN, HLD, CAD s/p CABG with ICM now s/p OHT (2020), CKDIII who presented to the ED with ~3 weeks of worsening diarrhea, dizziness, and headache. Nephrology was consulted for DAYA on CKD.      #DAYA  Baseline Scr appears to be ~1.2-1.7 prior to transplant. Fluctuated between 1.7 and 2.5 over the last year. Elevated to 5.77 on admission. UA with mild proteinuria otherwise bland. Likely 2/2 to volume depletion. Renal US 6/13 normal renal architecture with no hydronephrosis. Patient with UOP less concerning for obstructive etiology. Scr improved today but still significantly elevated.     #CKD, stage 3b  See above. Long standing chronic kidney disease likely CNI toxicity plus ASVD. On chronic immunosuppressants with known renal toxicity. Was previously taking tacrolimus but stopped and switched to mycophenolate and rapamycin in 7/2023 due to worsening renal function. Renal US 6/13 normal. Has DMII not well controlled. Last A1C (7/17/23) 8.7. Seen by nephrology OP 10/10/2023 in clinic at request of patient's cardiologist Dr. Sheridan.  --follow up nephrology outpatient      #HAGMA, resolved  #NAGMA, resolved  Serum bicarb 9 on admission. Anion gap 22. Lactate within normal limits, ketones negative. HAGMA likely 2/2 to chronic kidney disease. Also likely concurrent normal anion gap metabolic acidosis given diarrhea and lisinopril likely contributed to the DAYA.   -Resume home PTA bicarb       #Hypotension, improving  Blood pressure 87/55 on admission improved with IV fluids. Blood pressure at home in the 120-130s. On lisinopril and furosemide for chronic hypertension which is currently on hold.   -agree with holding lisinopril and furosemide until resolution of diarrhea     #Volume Status:   Appears euvolemic on exam. No edema. Admission wt 74.8 kg down from reported baseline of ~79  "kg.   -Encourage oral intake     #Diarrhea  3 weeks of watery diarrhea. Stool culture positive for norovirus, enteroaggregative ecoli. C. Diff PCR positive, toxin negative. Likely infectious etiology vs mycophenolate toxicity. MPA level ~180.   - Tx ID recommended azithromycin for EAEC   - agree reducing MMF dose         Recommendations were communicated to primary team via note.     Seen and discussed with Dr. Jillian Whatley MD  Nephrology fellow  [phone]       Interval History :   Nursing and provider notes from last 24 hours reviewed.  Pt reports 2 BM movement yesterday and two this morning. He denies any abd pain, nausea, or vomiting. He is being discharge today.     Review of Systems:   I reviewed the following systems:  GI: Normal appetite. Denies nausea or vomiting. Continued diarrhea.   Neuro:  No confusion  Constitutional:  Denies fever or chills  CV: Denies dyspnea or edema.  Denies chest pain.    Physical Exam:   I/O last 3 completed shifts:  In: 360 [P.O.:360]  Out: -    /60 (BP Location: Left arm)   Pulse 94   Temp 98.1  F (36.7  C) (Oral)   Resp 16   Ht 1.702 m (5' 7\")   Wt 73 kg (160 lb 14.4 oz)   SpO2 100%   BMI 25.20 kg/m       GENERAL APPEARANCE: In no acute distress, awake sitting up in bed eating breakfast. Pleasant and conversational.   EYES: No scleral icterus, pupils equal  Pulmonary: lungs clear to auscultation with equal breath sounds bilaterally, no clubbing  CV: regular rhythm, normal rate, no rub   - JVD none   - Edema none present in bilateral LE  GI: soft, nontender, normal bowel sounds  MS: no evidence of inflammation in joints, no muscle tenderness  : no villalobos  SKIN: no rash, warm, dry, no cyanosis  NEURO: face symmetric. A&O x3. No deficits grossly.  Access: PIV R forearm    Labs:   All labs reviewed by me  Electrolytes/Renal -   Recent Labs   Lab Test 10/15/23  1256 10/15/23  0702 10/15/23  0341 10/14/23  2251 10/14/23  2227 10/14/23  1659 " 10/14/23  1409 10/14/23  0802 10/14/23  0732 10/13/23  1741 10/13/23  1514 10/13/23  0824 10/13/23  0609 10/12/23  1651 10/12/23  1433 10/11/23  1656 10/11/23  1128   NA  --  141  --   --  140  --  142  --  141   < > 138  --  135   < > 135   < > 130*   POTASSIUM  --  4.5  --   --  4.7  --  5.3  --  4.7   < > 4.8  --  3.9   < > 2.9*   < > 4.1   CHLORIDE  --  105  --   --  106  --  105  --  105   < > 106  --  105   < > 111*   < > 99   CO2  --  22  --   --  22  --  24  --  24   < > 15*  --  15*   < > 9*   < > 9*   BUN  --  58.6*  --   --  55.3*  --  60.6*  --  63.6*   < > 80.5*  --  85.8*   < > 68.8*   < > 84.8*   CR  --  2.47*  --   --  2.52*  --  2.57*  --  2.63*   < > 3.20*  --  3.64*   < > 3.33*   < > 5.77*   * 111* 110*   < > 173*   < > 137*   < > 182*   < > 159*   < > 128*   < > 136*   < > 192*   BRYANNA  --  8.5*  --   --  8.3*  --  8.6*  --  8.3*   < > 7.9*  --  7.9*   < > 6.2*   < > 8.6*   MAG  --  1.9  --   --   --   --   --   --  1.9  --  2.1  --  1.5*  --   --    < > 1.8   PHOS  --   --   --   --   --   --   --   --   --   --   --   --  3.6  --  3.8  --  5.9*    < > = values in this interval not displayed.       CBC -   Recent Labs   Lab Test 10/15/23  0702 10/14/23  0732 10/13/23  0609   WBC 3.7* 3.2* 3.8*   HGB 8.4* 8.5* 8.2*   * 126* 132*       LFTs -   Recent Labs   Lab Test 10/11/23  1128 10/10/23  1011 07/17/23  0908 05/03/23  0844 02/02/23  1121   ALKPHOS 124  --  122  --  128   BILITOTAL 0.2  --  0.3  --  0.3   ALT 20  --  17  --  19   AST 22  --  24  --  23   PROTTOTAL 7.0  --  6.6  --  6.7   ALBUMIN 4.0 3.8 4.0   < > 4.0    < > = values in this interval not displayed.       Iron Panel -   Recent Labs   Lab Test 10/10/23  1011 07/28/23  1205 07/17/23  0908   IRON 90 80 56*   IRONSAT 38 35 24   SEVERIANO 236 190 123         Imaging:  All imaging studies reviewed by me.     Current Medications:          Maegan Whatley MD  Nephrology fellow  [phone]

## 2023-10-15 NOTE — PROGRESS NOTES
Neuro: A&Ox4.   Cardiac: SR. VSS.   Respiratory: Sating in the high 90's  on RA.  GI/: Adequate urine output. No BM this shift.  Diet/appetite: Tolerating low lactose diet. Eating well.  Activity:  Independent  Pain: Denies pain  Skin: No new deficits noted.  LDA's: R&L PIV's SL    Plan: Continue with POC. Notify primary team with changes.

## 2023-10-15 NOTE — PROGRESS NOTES
Patient/Family education completed: Yes. All instructions given using the discharge paperwork and [facility] . Both patient and his wife, Shivani, in attendance. They stated their children would also help them with written instructions when home.     Discharge instruction paperwork given to: His wifeShivani    Time of discharge: 1332    Discharged to: Home with his wife. Family is picking them up    All belongings verified/sent: WifeShivani gathered all belongings. No concerns or complications made known.     Recommendations (e.g. Family needs/recent issues/things to watch for): Reiterated the importance of hand hygiene and for people to wash their hands if in contact with patient.    Asked patient and his wife to please go over the discharge paperwork again with a family member that is fluent in reading English.     Emphasized to patient and his wife to call the telephone numbers on the discharge paperwork if anything is at all unclear.

## 2023-10-16 ENCOUNTER — TELEPHONE (OUTPATIENT)
Dept: TRANSPLANT | Facility: CLINIC | Age: 68
End: 2023-10-16
Payer: COMMERCIAL

## 2023-10-16 DIAGNOSIS — Z94.1 HEART TRANSPLANT, ORTHOTOPIC, STATUS (H): Primary | ICD-10-CM

## 2023-10-16 LAB
BACTERIA BLD CULT: NO GROWTH
BACTERIA BLD CULT: NO GROWTH

## 2023-10-16 NOTE — PROGRESS NOTES
Addendum to Discharge Summary dated 10/15    #Chronic Thrombocytopenia  - platelets stable    # Acute Kidney Injury With Tubular Necrosis (resolved)  - Resolved (see discharge summary)    Demetra Moran MD

## 2023-10-16 NOTE — TELEPHONE ENCOUNTER
Pt called using  to review hospital discharge follow up.  Pt states he is feeling much better.  Plan for pt to have labs checked on Wed with BMP ,CBC, and Sirolimus level.  Follow up appointment also made on 10/25/23 for pt with Arlin Guajardo NP.  Pt states understanding all information.

## 2023-10-18 ENCOUNTER — TELEPHONE (OUTPATIENT)
Dept: TRANSPLANT | Facility: CLINIC | Age: 68
End: 2023-10-18

## 2023-10-18 ENCOUNTER — LAB (OUTPATIENT)
Dept: LAB | Facility: CLINIC | Age: 68
End: 2023-10-18
Payer: COMMERCIAL

## 2023-10-18 DIAGNOSIS — Z94.1 HEART TRANSPLANT, ORTHOTOPIC, STATUS (H): ICD-10-CM

## 2023-10-18 DIAGNOSIS — E11.3513 TYPE 2 DIABETES MELLITUS WITH PROLIFERATIVE RETINOPATHY OF BOTH EYES AND MACULAR EDEMA, UNSPECIFIED WHETHER LONG TERM INSULIN USE (H): Primary | ICD-10-CM

## 2023-10-18 DIAGNOSIS — Z94.1 HEART TRANSPLANT, ORTHOTOPIC, STATUS (H): Primary | ICD-10-CM

## 2023-10-18 LAB
ANION GAP SERPL CALCULATED.3IONS-SCNC: 12 MMOL/L (ref 7–15)
BASO+EOS+MONOS # BLD AUTO: ABNORMAL 10*3/UL
BASO+EOS+MONOS NFR BLD AUTO: ABNORMAL %
BASOPHILS # BLD AUTO: 0 10E3/UL (ref 0–0.2)
BASOPHILS NFR BLD AUTO: 0 %
BUN SERPL-MCNC: 52 MG/DL (ref 8–23)
CALCIUM SERPL-MCNC: 8.2 MG/DL (ref 8.8–10.2)
CHLORIDE SERPL-SCNC: 102 MMOL/L (ref 98–107)
CREAT SERPL-MCNC: 2.19 MG/DL (ref 0.67–1.17)
DEPRECATED HCO3 PLAS-SCNC: 20 MMOL/L (ref 22–29)
EGFRCR SERPLBLD CKD-EPI 2021: 32 ML/MIN/1.73M2
EOSINOPHIL # BLD AUTO: 0 10E3/UL (ref 0–0.7)
EOSINOPHIL NFR BLD AUTO: 1 %
ERYTHROCYTE [DISTWIDTH] IN BLOOD BY AUTOMATED COUNT: 14 % (ref 10–15)
GLUCOSE SERPL-MCNC: 221 MG/DL (ref 70–99)
HCT VFR BLD AUTO: 24.9 % (ref 40–53)
HGB BLD-MCNC: 8.3 G/DL (ref 13.3–17.7)
IMM GRANULOCYTES # BLD: 0 10E3/UL
IMM GRANULOCYTES NFR BLD: 0 %
LYMPHOCYTES # BLD AUTO: 1 10E3/UL (ref 0.8–5.3)
LYMPHOCYTES NFR BLD AUTO: 29 %
MCH RBC QN AUTO: 27.7 PG (ref 26.5–33)
MCHC RBC AUTO-ENTMCNC: 33.3 G/DL (ref 31.5–36.5)
MCV RBC AUTO: 83 FL (ref 78–100)
MONOCYTES # BLD AUTO: 0.2 10E3/UL (ref 0–1.3)
MONOCYTES NFR BLD AUTO: 7 %
NEUTROPHILS # BLD AUTO: 2.1 10E3/UL (ref 1.6–8.3)
NEUTROPHILS NFR BLD AUTO: 63 %
NRBC # BLD AUTO: 0 10E3/UL
NRBC BLD AUTO-RTO: 0 /100
PLATELET # BLD AUTO: 123 10E3/UL (ref 150–450)
POTASSIUM SERPL-SCNC: 3.4 MMOL/L (ref 3.4–5.3)
RBC # BLD AUTO: 3 10E6/UL (ref 4.4–5.9)
SIROLIMUS BLD-MCNC: 6.2 UG/L (ref 5–15)
SODIUM SERPL-SCNC: 134 MMOL/L (ref 135–145)
TME LAST DOSE: NORMAL H
TME LAST DOSE: NORMAL H
WBC # BLD AUTO: 3.3 10E3/UL (ref 4–11)

## 2023-10-18 PROCEDURE — 80048 BASIC METABOLIC PNL TOTAL CA: CPT | Performed by: PATHOLOGY

## 2023-10-18 PROCEDURE — 36415 COLL VENOUS BLD VENIPUNCTURE: CPT | Performed by: PATHOLOGY

## 2023-10-18 PROCEDURE — 80195 ASSAY OF SIROLIMUS: CPT | Performed by: INTERNAL MEDICINE

## 2023-10-18 PROCEDURE — 85025 COMPLETE CBC W/AUTO DIFF WBC: CPT | Performed by: PATHOLOGY

## 2023-10-18 ASSESSMENT — ENCOUNTER SYMPTOMS: NEW SYMPTOMS OF CORONARY ARTERY DISEASE: 0

## 2023-10-18 NOTE — TELEPHONE ENCOUNTER
Patient does not want to reschedule at this time. Wants to speak with MD first and will call back when ready.

## 2023-10-19 ENCOUNTER — TELEPHONE (OUTPATIENT)
Dept: TRANSPLANT | Facility: CLINIC | Age: 68
End: 2023-10-19
Payer: COMMERCIAL

## 2023-10-19 NOTE — TELEPHONE ENCOUNTER
Pt called with lab results using .  Creat is improving and Sirolimus level is at goal- no changes needed for now.  Pt states understanding information.

## 2023-10-25 ENCOUNTER — OFFICE VISIT (OUTPATIENT)
Dept: CARDIOLOGY | Facility: CLINIC | Age: 68
End: 2023-10-25
Attending: INTERNAL MEDICINE
Payer: COMMERCIAL

## 2023-10-25 ENCOUNTER — LAB (OUTPATIENT)
Dept: LAB | Facility: CLINIC | Age: 68
End: 2023-10-25
Payer: COMMERCIAL

## 2023-10-25 VITALS
DIASTOLIC BLOOD PRESSURE: 74 MMHG | BODY MASS INDEX: 25.78 KG/M2 | SYSTOLIC BLOOD PRESSURE: 120 MMHG | WEIGHT: 164.6 LBS | OXYGEN SATURATION: 99 % | HEART RATE: 92 BPM

## 2023-10-25 DIAGNOSIS — Z94.1 HEART REPLACED BY TRANSPLANT (H): Primary | ICD-10-CM

## 2023-10-25 DIAGNOSIS — Z94.1 HEART TRANSPLANT, ORTHOTOPIC, STATUS (H): ICD-10-CM

## 2023-10-25 LAB
ALBUMIN SERPL BCG-MCNC: 3.7 G/DL (ref 3.5–5.2)
ALP SERPL-CCNC: 138 U/L (ref 40–129)
ALT SERPL W P-5'-P-CCNC: 13 U/L (ref 0–70)
ANION GAP SERPL CALCULATED.3IONS-SCNC: 12 MMOL/L (ref 7–15)
AST SERPL W P-5'-P-CCNC: 17 U/L (ref 0–45)
BASOPHILS # BLD AUTO: 0 10E3/UL (ref 0–0.2)
BASOPHILS NFR BLD AUTO: 0 %
BILIRUB SERPL-MCNC: 0.2 MG/DL
BUN SERPL-MCNC: 30.2 MG/DL (ref 8–23)
CALCIUM SERPL-MCNC: 8.5 MG/DL (ref 8.8–10.2)
CHLORIDE SERPL-SCNC: 106 MMOL/L (ref 98–107)
CREAT SERPL-MCNC: 1.96 MG/DL (ref 0.67–1.17)
DEPRECATED HCO3 PLAS-SCNC: 19 MMOL/L (ref 22–29)
EGFRCR SERPLBLD CKD-EPI 2021: 36 ML/MIN/1.73M2
EOSINOPHIL # BLD AUTO: 0 10E3/UL (ref 0–0.7)
EOSINOPHIL NFR BLD AUTO: 1 %
ERYTHROCYTE [DISTWIDTH] IN BLOOD BY AUTOMATED COUNT: 14.2 % (ref 10–15)
GLUCOSE SERPL-MCNC: 223 MG/DL (ref 70–99)
HCT VFR BLD AUTO: 24.1 % (ref 40–53)
HGB BLD-MCNC: 8 G/DL (ref 13.3–17.7)
IMM GRANULOCYTES # BLD: 0 10E3/UL
IMM GRANULOCYTES NFR BLD: 0 %
LYMPHOCYTES # BLD AUTO: 1 10E3/UL (ref 0.8–5.3)
LYMPHOCYTES NFR BLD AUTO: 27 %
MAGNESIUM SERPL-MCNC: 1.6 MG/DL (ref 1.7–2.3)
MCH RBC QN AUTO: 27.2 PG (ref 26.5–33)
MCHC RBC AUTO-ENTMCNC: 33.2 G/DL (ref 31.5–36.5)
MCV RBC AUTO: 82 FL (ref 78–100)
MONOCYTES # BLD AUTO: 0.3 10E3/UL (ref 0–1.3)
MONOCYTES NFR BLD AUTO: 9 %
NEUTROPHILS # BLD AUTO: 2.3 10E3/UL (ref 1.6–8.3)
NEUTROPHILS NFR BLD AUTO: 63 %
NRBC # BLD AUTO: 0 10E3/UL
NRBC BLD AUTO-RTO: 0 /100
PHOSPHATE SERPL-MCNC: 1.5 MG/DL (ref 2.5–4.5)
PLATELET # BLD AUTO: 167 10E3/UL (ref 150–450)
POTASSIUM SERPL-SCNC: 4 MMOL/L (ref 3.4–5.3)
PROT SERPL-MCNC: 6.5 G/DL (ref 6.4–8.3)
RBC # BLD AUTO: 2.94 10E6/UL (ref 4.4–5.9)
SODIUM SERPL-SCNC: 137 MMOL/L (ref 135–145)
WBC # BLD AUTO: 3.7 10E3/UL (ref 4–11)

## 2023-10-25 PROCEDURE — 80053 COMPREHEN METABOLIC PANEL: CPT | Performed by: PATHOLOGY

## 2023-10-25 PROCEDURE — G0463 HOSPITAL OUTPT CLINIC VISIT: HCPCS | Performed by: NURSE PRACTITIONER

## 2023-10-25 PROCEDURE — 99215 OFFICE O/P EST HI 40 MIN: CPT | Performed by: NURSE PRACTITIONER

## 2023-10-25 PROCEDURE — 83735 ASSAY OF MAGNESIUM: CPT | Performed by: PATHOLOGY

## 2023-10-25 PROCEDURE — 85025 COMPLETE CBC W/AUTO DIFF WBC: CPT | Performed by: PATHOLOGY

## 2023-10-25 PROCEDURE — 84100 ASSAY OF PHOSPHORUS: CPT | Performed by: PATHOLOGY

## 2023-10-25 PROCEDURE — 36415 COLL VENOUS BLD VENIPUNCTURE: CPT | Performed by: PATHOLOGY

## 2023-10-25 RX ORDER — SULFAMETHOXAZOLE AND TRIMETHOPRIM 400; 80 MG/1; MG/1
1 TABLET ORAL
Qty: 36 TABLET | Refills: 3 | Status: SHIPPED | OUTPATIENT
Start: 2023-10-26 | End: 2024-08-07

## 2023-10-25 ASSESSMENT — PAIN SCALES - GENERAL: PAINLEVEL: NO PAIN (0)

## 2023-10-25 NOTE — PATIENT INSTRUCTIONS
Patient Instructions  1. Restart your Bactrim three times a week.  Monday, Wednesday, Friday  2. If your blood pressure is greater than 130/90 call Christelle.  3. Return on 11/8/23 for labs and visit with Arlin Guajardo.    Next transplant clinic appointment:  In 2 weeks with Arlin Guajardo  Next lab draw: in one week.  Coordinator will call with all pending results.     Please call transplant coordinator with any questions:    Christelle Pettit RN BSN   Post Heart Transplant Nurse Coordinator  [facility]  Questions: [phone]

## 2023-10-25 NOTE — NURSING NOTE
Transplant Coordinator Note    Reason for visit: Post hospital check in after c-diff  Coordinator: Present   Caregiver:  wife    Health concerns addressed today:  1. Creat slowly improving.  2. Still having some diarrhea- but improving.  3.       Immunosuppressants:  MMF 1500  Sirolimus- 3mg daily Goal 6-8    Routine screenings:    Derm:   Dental:  Colonoscopy:  Breast/Prostate:  Eye:   Flu/Pneumonia:     Labs:       Additional Notes:         Meds, labs, reviewed with patient  Medication record reviewed and reconciled  Questions and concerns addressed  Pt verbalized an understanding of plan of care.     Patient Instructions  1. Restart your Bactrim three times a week.  Monday, Wednesday, Friday  2. If your blood pressure is greater than 130/90 call Christelle.  3. Return on 11/8/23 for labs and visit with Arlin Guajardo.    Next transplant clinic appointment:  In 2 weeks with Arlin Guajardo  Next lab draw: in one week.  Coordinator will call with all pending results.     Please call transplant coordinator with any questions:    Christelle Pettit RN BSN   Post Heart Transplant Nurse Coordinator  [facility]  Questions: [phone]

## 2023-10-25 NOTE — PROGRESS NOTES
[facility]  October 25, 2023    HPI:   Mr. Lucian Oliveira is a 68yr old male with a history of CAD (s/p 3v CABG 2008), ICM s/p OHT 7/19/20, DMII, CKD, and HTN who presents to clinic for follow-up of recent hospitalization.  A professional  was used for this visit.    He presented to the ED on 10/11 with hypotension, headache, and a 3-week history of diarrhea.  His blood pressure was 87/55.  Labs revealed a creatinine of 5.77 and CO2 of 9, so he was admitted for further management.  He was found to be +CDiff, which was thought to be chronic colonization as the toxin was negative.  He was also found to be +norovirus.  CMV negative.  CT ab/pelvis was negative for colitis.  He was treated with a sodium bicarb infusion and IV fluids, with improvement in his DAYA and metabolic acidosis.  He was treated with a 10-day course of azithromycin, and his MMF was briefly decreased to 1000mg BID given his acute infection.  His Lasix was held, and he discharged 10/15.      Transplant-related history:  His post-operative course was c/b primary graft dysfunction (LVEF 20-25% and severe RV dysfunction, thought to be secondary to prolonged ischemic time, resolved by f/up TTE 7/27/20), VT, pericardial effusion (conservatively managed with resolution), donor streptococcus salivarius bacteremia (s/p 7d course of ceftriaxone), and RUE DVT c/b HIT (s/p PLEX).  He ultimately discharged 8/3/20.     He has been readmitted as follows:  - 8/24/20-8/28/20:  hyperglycemia (BGs 500s), thought to be secondary to incorrect home insulin administration  - 9/22/20-10/5/20:  fevers and drainage from former ICD site, with Chest/abd CT concerning for cellulitis in the epigastric region.  He underwent surgical debridement of his left infraclavicular wound and debridement of midline chest tube wound on 9/23, which was c/b bleeding and required reoperation 9/28 for I&D of hematoma.  Cultures were positive for Pseudomonas  "aeruginosa, so he was treated with 4 weeks of cipro per Transplant ID and CVTS.  He developed leukopenia and moderate neutropenia, so his cellcept was held then restarted at low dose, and valcyte was stopped.  He received neupogen x 1.  He ultimately discharged with a wound vac.  - 12/9/20:  ED visit for right vision loss, and found to have a vitreous hemorrhage in his right eye and total retinal detachment of his left eye per ophthalmology.  He was then seen in the eye clinic 12/10, where he was advised an Avastin injection in the right eye (for proliferative diabetic retinopathy and vitreous hemorrhage).  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  Last RHC:  7/2023 showed elevated biventricular filling pressures, with RA 14, mPA 30, PCW 22, and CI 3.4.  Echo/cMRI: cMRI 9/2022 showed normal biventricular function (LVEF 74%, RVEF 59%) and was negative for ischemia but revealed a new foci of mesocardial/epicardial LGE in the midventricular inferoseptum and anterior segments in a nonischemic pattern.  TTE 7/2023 showed stable graft function, with LVEF 55-60% and normal RV size/function.    Since discharge, he states that he feels better overall.  He is not exercising, but feels like his strength and endurance remain well, and states that he is not weak.  His breathing has been well, and he denies shortness of breath, PND, and orthopnea.  He has been eating small amounts, regularly throughout the day.  He denies nausea and vomiting.  He continues to have " diarrhea, which has improved, and is down to 1-2 episodes per day.  His weight is up, he was 168# this morning, and notes that he was 160#at discharge.  He does not feel any fluid retention.  He is still holding Lasix.  His blood pressures remained stable, ranging 110-120/70s, and he continues to hold lisinopril.  He has had headaches since the diarrhea started, which have improved.  He otherwise denies chest pain, palpitations, dizziness, falls, acute vision changes, fevers, chills, cough, sore throat, and signs of bleeding.      Serostatus:  CMV D+/R+  EBV D+/R+  Toxo D+/R-    Patient Active Problem List    Diagnosis Date Noted    Acute renal failure superimposed on chronic kidney disease, unspecified acute renal failure type, unspecified CKD stage  10/11/2023     Priority: Medium    Lipid screening 07/17/2023     Priority: Medium    Prostate cancer screening 07/17/2023     Priority: Medium    Type 2 diabetes mellitus with diabetic nephropathy, with long-term current use of insulin (H) 07/17/2023     Priority: Medium    Nuclear senile cataract of right eye 01/25/2023     Priority: Medium     Added automatically from request for surgery [id number]      Acute embolism and thrombosis of right axillary vein (H) 03/02/2022     Priority: Medium    Atrial flutter, unspecified type (H) 12/21/2021     Priority: Medium    Immunodeficiency, unspecified (H24) 02/02/2021     Priority: Medium    CKD (chronic kidney disease) stage 4, GFR 15-29 ml/min (H) 02/02/2021     Priority: Medium    HIT (heparin-induced thrombocytopenia) (H24) 02/02/2021     Priority: Medium    Traction detachment of left retina 12/14/2020     Priority: Medium     Added automatically from request for surgery [id number]      Need for prophylactic antibiotic 10/05/2020     Priority: Medium    Sepsis (H) 09/22/2020     Priority: Medium    Heart transplant, orthotopic, status (H) 07/20/2020     Priority: Medium    Dental caries 07/03/2020     Priority: Medium      Added automatically from request for surgery [id number]      Heart replaced by transplant (H) 07/03/2020     Priority: Medium     Added automatically from request for surgery [id number]      Status post coronary angiogram 07/02/2020     Priority: Medium    Coronary artery disease involving native coronary artery of native heart without angina pectoris 06/18/2020     Priority: Medium     Added automatically from request for surgery [id number]      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, unspecified whether long term insulin use (H) 11/27/2019     Priority: Medium     Added automatically from request for surgery [id number]      Nuclear cataract, nonsenile 11/27/2019     Priority: Medium     Added automatically from request for surgery [id number]      CHANTEL (obstructive sleep apnea)- severe (AHI 30) 10/06/2016     Priority: Medium     Study Date: 10/03/2016- (196.0 lbs) apnea/hypopnea index 30.8. REM AHI 40,  supine AHI n/a. RDI  33.1 . Lowest oxygen saturation 82.8%.  Time spent less than or equal to 88% 4.9 minutes.  Time spent less than or equal to 89% 7.3 minutes. CPAP final  pressure 7 cmH2O with AHI of 0.8.  Time in REM supine on final pressure 0 minutes. No consolidated sleep seen in supine position. During diagnostic portion of study, PLM index 18.2. During treatment portion of study,  PLM index 19.3.            Moderate nonproliferative diabetic retinopathy, without macular edema, associated with type 2 diabetes mellitus 08/16/2016     Priority: Medium    Cortical cataract of both eyes 08/15/2016     Priority: Medium    Secondary renal hyperparathyroidism (H24) 07/26/2016     Priority: Medium    Proteinuria 03/18/2016     Priority: Medium    Vitamin D deficiency 03/18/2016     Priority: Medium    OA (osteoarthritis) of knee 03/18/2016     Priority: Medium    Chondromalacia of patella, unspecified laterality 03/18/2016     Priority: Medium    Pain in joint of left shoulder  03/18/2016     Priority: Medium    Tinnitus 03/18/2016     Priority: Medium     left       Ptosis of right eyelid 03/18/2016     Priority: Medium    Chronic systolic heart failure (H)      Priority: Medium     Echo 7/2015 LVEF 18%      Automatic implantable cardioverter-defibrillator - [employer] single lead ICD, Not Dependent 08/18/2015     Priority: Medium    Health Care Home 01/19/2015     Priority: Medium     *See Letters for [facility] Care Plan: Emergency Care Plan        CKD (chronic kidney disease) stage 3, GFR 30-59 ml/min (H) 01/28/2013     Priority: Medium    CHF (NYHA class II, ACC/AHA stage C) (H) 08/15/2012     Priority: Medium    Hypertension goal BP (blood pressure) < 140/90 01/21/2011     Priority: Medium    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) 10/31/2010     Priority: Medium     Goal <8%      Hyperlipidemia LDL goal <100 10/31/2010     Priority: Medium    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) 03/15/2010     Priority: Medium     MI and open heart with 1 stent placed in 2008; another minor MI in 2009.  MI on 2/19/15. Coronary angiogram from [facility] on 2/19/15 showed severe 3 vessel native CAD (all three vessels [LAD, LCx and RCA] reported to be 100% ocludded at their ostia). All his grafts were considered to be occluded except for LIMA-to-LAD, which was patent and supplied left-to-left, as well as, left-to-right collaterals. There were no targets suitable for revascularization and patient was put on medical therapy.          PAST MEDICAL HISTORY:  Past Medical History:   Diagnosis Date    CAD (coronary artery disease)     CHF (congestive heart failure) (H)     CKD (chronic kidney disease), stage III (H)     Cortical cataract of both eyes     Diabetes (H)     Hyperlipidemia     Hypertension     Infection due to Streptococcus mitis group 09/23/2020    Ischemic cardiomyopathy      Obesity     CHANTEL (obstructive sleep apnea)     occas cpap    CHANTEL (obstructive sleep apnea)- severe (AHI 30)     Osteoarthritis        CURRENT MEDICATIONS:  Prescription Medications as of 10/25/2023         Rx Number Disp Refills Start End Last Dispensed Date Next Fill Date Owning Pharmacy    acetaminophen (TYLENOL) 325 MG tablet  60 tablet 3 2020    [facility] [address] [place] [address]    Sig: Take 3 tablets (975 mg) by mouth every 8 hours as needed for mild pain    Class: E-Prescribe    Route: Oral    Alcohol Swabs PADS  100 each 0 8/3/2020    [address]    Sig: Use to swab the area of the injection or sergio as directed   Per insurance coverage    Class: Local Print    aspirin (ASA) 81 MG chewable tablet  120 tablet 0 10/6/2020    [address]    Sig: Take 1 tablet (81 mg) by mouth daily    Class: E-Prescribe    Route: Oral    azithromycin (ZITHROMAX) 500 MG tablet  14 tablet 0 10/15/2023    [address]    Sig: Take 1 tablet (500 mg) by mouth daily    Class: E-Prescribe    Route: Oral    blood glucose (NO BRAND SPECIFIED) test strip  400 strip 3 2023    [id number] [address] AT [address]    Sig: Use to test blood sugar 4 times daily or as directed.    Class: E-Prescribe    blood glucose monitoring (ACCU-CHEK FELIPE SMARTVIEW) meter device kit  1 kit 0 2017    [facility] [address]    Sig: Use to test blood sugar 3-4 times daily, as directed.    Class: E-Prescribe    blood glucose monitoring (SOFTCLIX) lancets  400 each 8 3/29/2022    [facility] #[id number] [facility] [address] AT [address]    Si each 4 times daily Use to test blood sugars 2 times daily.    Class:  E-Prescribe    Route: Does not apply    Calcium Carb-Cholecalciferol (CALCIUM CARBONATE-VITAMIN D3) 600-400 MG-UNIT TABS  180 tablet 3 9/2/2022    [facility] - [place], [place] - [facility] [address]    Sig: TAKE ONE TABLET BY MOUTH TWICE A DAY WITH MEALS    Class: E-Prescribe    Dulaglutide (TRULICITY) 4.5 MG/0.5ML SOPN  6 mL 1 5/31/2023    [facility] - [place] [address]    Sig: Inject 4.5 mg Subcutaneous once a week    Class: E-Prescribe    Route: Subcutaneous    ferrous sulfate (FEROSUL) 325 (65 Fe) MG tablet  90 tablet 3 7/20/2023    [facility] - [place] [address]    Sig: Take 1 tablet (325 mg) by mouth daily (with breakfast)    Class: E-Prescribe    Route: Oral    insulin lispro (HUMALOG KWIKPEN) 100 UNIT/ML (1 unit dial) KWIKPEN            Sig: Inject 8-14 Units Subcutaneous 3 times daily (before meals)    Class: Historical    Route: Subcutaneous    insulin NPH (HUMULIN N KWIKPEN) 100 UNIT/ML injection  45 mL 1 10/9/2023    [facility] - [place] [address] [address] [facility]    Sig: GIve 40 units each morning and 12 at night subcutaneous    Class: E-Prescribe    insulin pen needle (32G X 4 MM) 32G X 4 MM miscellaneous  450 each 3 1/17/2023    [facility] #[id number] [place] [address] AT [address]    Sig: Use 5-6 pen needles daily or as directed.    Class: E-Prescribe    ketorolac (ACULAR) 0.5 % ophthalmic solution  10 mL 3 3/9/2023    [facility] #[id number] [place] [id number] AT [address]    Sig: Place 1 drop into the right eye 2 times daily    Class: E-Prescribe    Route: Right Eye    latanoprost (XALATAN) 0.005 % ophthalmic solution            Sig: Place 1 drop into both eyes at bedtime    Class: Historical    Route: Both Eyes    magnesium oxide 400 MG tablet  180 tablet 1 6/20/2023    [facility] - [facility] [address] [facility]    Sig: Take 1 tablet (400 mg) by mouth 2 times daily    Class: E-Prescribe    Route: Oral    mycophenolate (GENERIC EQUIVALENT) 500 MG tablet  180 tablet 11 6/15/2023    [facility] - [place] [address] [address]    Sig: Take 3 tablets (1,500 mg) by mouth 2 times daily    Class: E-Prescribe    Notes to Pharmacy: TXP DT 7/19/2020 (Heart) TXP Dischg DT 8/3/2020 DX Heart transplant Z94.1 TX Center [facility] ([place], [place])    Route: Oral    omega-3 acid ethyl esters (LOVAZA) 1 g capsule  90 capsule 3 7/14/2023    [facility] - [place] [id number] [facility]    Sig: Take 1 capsule (1 g) by mouth daily    Class: E-Prescribe    Route: Oral    prednisoLONE acetate (PRED FORTE) 1 % ophthalmic suspension  10 mL 3 3/9/2023    [facility] #[id number] [place] [id number] AT [facility]    Sig: Place 1 drop into the right eye 2 times daily    Class: E-Prescribe    Route: Right Eye    rosuvastatin (CRESTOR) 20 MG tablet  90 tablet 3 2/24/2023    [facility] [address] [place] [address] [address] AT [address]    Sig: Take 1 tablet (20 mg) by mouth daily    Class: E-Prescribe    Route: Oral    senna-docusate (SENOKOT-S/PERICOLACE) 8.6-50 MG tablet  60 tablet 3 12/1/2021    [facility] - [place] [address] [address]    Sig: Take 1 tablet by mouth 2 times daily as needed (hold for loose stools)    Class: E-Prescribe    Route: Oral    sirolimus (GENERIC EQUIVALENT) 0.5 MG tablet  180 tablet 11 7/17/2023    [facility] - [place] [address]    Sig: Take 6 tablets (3 mg) by mouth daily    Class: E-Prescribe    Notes to Pharmacy: TXP DT 7/19/2020 (Heart) TXP Dischg DT 8/3/2020 DX Heart transplant Z94.1 TX Center [facility] ([place], [place])  Dose adjustment    Route: Oral    No prior authorization was found for this prescription.    Found prior authorization for another prescription for the same medication: Canceled - Other (The medication order is discontinued.)    tamsulosin (FLOMAX) 0.4 MG capsule  90 capsule 3 9/2/2022    [facility] - [place], [place] - [address]     Sig: TAKE ONE CAPSULE BY MOUTH ONCE DAILY    Class: E-Prescribe            ROS:   A comprehensive ROS was completed and found to be negative except for that noted in the HPI.    Exam:  /74 (BP Location: Right arm, Patient Position: Chair, Cuff Size: Adult Large)   Pulse 92   Wt 74.7 kg (164 lb 9.6 oz)   SpO2 99%   BMI 25.78 kg/m    In general, the patient is a pleasant male in no apparent distress.    HEENT: NC/AT. PERRLA. EOMI.  Sclerae white, not injected.    Neck:  No adenopathy, No thyromegaly.    COR: No jugular venous distention when sitting upright.  RRR.  Normal S1 S2 splits physiologically.  No murmur, rub click, or gallop.    Lungs:  CTA. No rhonchi.    Abdomen: soft, nontender, nondistended.  No organomegaly.  Extremities:  No clubbing, cyanosis, or LE edema.    Neuro: Alert & Oriented x 3, grossly non focal.  Integument: no open lesions, rashes, or jaundice.    Labs:  CBC RESULTS:   Lab Results   Component Value Date    WBC 3.3 (L) 10/18/2023    WBC 5.1 05/11/2021    RBC 3.00 (L) 10/18/2023    RBC 4.47 05/11/2021    HGB 8.3 (L) 10/18/2023    HGB 13.4 05/11/2021    HCT 24.9 (L) 10/18/2023    HCT 41.5 05/11/2021    MCV 83 10/18/2023    MCV 93 05/11/2021    MCH 27.7 10/18/2023    MCH 30.0 05/11/2021    MCHC 33.3 10/18/2023    MCHC 32.3 05/11/2021    RDW 14.0 10/18/2023    RDW 13.2 05/11/2021     (L) 10/18/2023     05/11/2021       BMP RESULTS:  Lab Results   Component Value Date     (L) 10/18/2023     05/11/2021    POTASSIUM 3.4 10/18/2023    POTASSIUM 4.4 03/14/2022    POTASSIUM 4.0 05/11/2021    CHLORIDE 102  "10/18/2023    CHLORIDE 109 03/14/2022    CHLORIDE 107 05/11/2021    CO2 20 (L) 10/18/2023    CO2 26 03/14/2022    CO2 27 05/11/2021    ANIONGAP 12 10/18/2023    ANIONGAP 6 03/14/2022    ANIONGAP 6 05/11/2021     (H) 10/18/2023     (H) 10/15/2023     (H) 03/14/2022    GLC 98 05/11/2021    BUN 52.0 (H) 10/18/2023    BUN 29 03/14/2022    BUN 34 (H) 05/11/2021    CR 2.19 (H) 10/18/2023    CR 1.84 (H) 05/11/2021    GFRESTIMATED 32 (L) 10/18/2023    GFRESTIMATED 37 (L) 05/11/2021    GFRESTBLACK 43 (L) 05/11/2021    BRYANNA 8.2 (L) 10/18/2023    BRYANNA 9.0 05/11/2021      LIPID RESULTS:  Lab Results   Component Value Date    CHOL 92 07/17/2023    CHOL 133 01/05/2021    HDL 30 (L) 07/17/2023    HDL 40 01/05/2021    LDL 26 07/17/2023    LDL 58 01/05/2021    TRIG 178 (H) 07/17/2023    TRIG 179 (H) 01/05/2021    CHOLHDLRATIO 2.6 06/27/2015    NHDL 62 07/17/2023    NHDL 94 01/05/2021       IMMUNOSUPPRESSANT LEVELS  Lab Results   Component Value Date    TACROL 4.5 (L) 02/14/2023    TACROL 5.6 05/11/2021    DOSTAC 2/13/2023 02/14/2023    DOSTAC  7pm 5/10/21 05/11/2021    RAPAMY 6.2 10/18/2023       No components found for: \"CK\"  Lab Results   Component Value Date    MAG 1.9 10/15/2023    MAG 1.8 05/11/2021     Lab Results   Component Value Date    A1C 8.7 (H) 07/17/2023    A1C 6.7 (H) 01/14/2021     Lab Results   Component Value Date    PHOS 3.6 10/13/2023    PHOS 3.2 05/11/2021     Lab Results   Component Value Date    NTBNPI 8,792 (H) 09/22/2020     Lab Results   Component Value Date    SAITESTMET SA EDTA FCS 07/17/2023    SAITESTMET SA FCS 01/05/2021    SAICELL Class I 07/17/2023    SAICELL Class I 01/05/2021    [id number] None 07/17/2023    [id number] None 01/05/2021    [id number] None 07/17/2023    [id number] None 01/05/2021    SAIREPCOM  07/17/2023      HLA PRA Test performed by modified testing procedure that may also include pretreatment of serum. Pretreatment may be the addition of fetal calf serum, EDTA, " and/or adsorption.  High-risk, MFI > 3,000.  Mod-risk, -3,000.    SAIREPCOM  01/05/2021      Test performed by modified procedure. Serum heat inactivated and tested   by a modified ([place]) protocol including fetal calf serum addition.   High-risk, mfi >3,000. Mod-risk, mfi 500-3,000.       Lab Results   Component Value Date    SAIITESTME SA EDTA FCS 07/17/2023    SAIITESTME SA FCS 01/05/2021    SAIICELL Class II 07/17/2023    SAIICELL Class II 01/05/2021    [id number] None 07/17/2023    [id number] None 01/05/2021    [id number] None 07/17/2023    [id number] None 01/05/2021    SAIIREPCOM  07/17/2023      HLA PRA Test performed by modified testing procedure that may also include pretreatment of serum. Pretreatment may be the addition of fetal calf serum, EDTA, and/or adsorption.  High-risk, MFI > 3,000.  Mod-risk, -3,000.    SAIIREPCOM  01/05/2021      Test performed by modified procedure. Serum heat inactivated and tested   by a modified ([place]) protocol including fetal calf serum addition.   High-risk, mfi >3,000. Mod-risk, mfi 500-3,000.       Lab Results   Component Value Date    CSPEC EDTA PLASMA 05/11/2021       Assessment and Plan:  Mr. Lucian Oliveira is a 68yr old male with a history of CAD (s/p 3v CABG 2008), ICM s/p OHT 7/19/20, DMII, CKD, and HTN who presents to clinic for follow-up of recent hospitalization.  A professional  was used for this visit.      Labs today show stable electrolytes, improving renal function, stable liver function, and stable blood counts.    Mr. Oliveira appears stable today.  His weight is up slightly, but he appears euvolemic., off diuretics since discharge.  Asked that he call if his weight were to continue to rise, as we would reconsider his diuresis plan.    His blood pressures remain controlled, off lisinopril since discharge.  Asked that he call if his blood pressures were to persist > 130/90, as we would then reintroduce lisinopril.    As his renal  "function has improved, we will plan to restart Bactrim for toxo PPx -- advised that he restart single strength thrice weekly, on Mondays, Wednesdays, and Fridays.  We will plan for him to repeat a BMP in 2 weeks to follow-up this change.    We will plan for him to follow-up in 2 weeks, or sooner should new concerns arise.      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  He ultimately discharged 8/3/20.     He has been readmitted as follows:  - 8/24/20-8/28/20:  hyperglycemia (BGs 500s), thought to be secondary to incorrect home insulin administration  - 9/22/20-10/5/20:  fevers and drainage from former ICD site, with Chest/abd CT concerning for cellulitis in the epigastric region.  He underwent surgical debridement of his left infraclavicular wound and debridement of midline chest tube wound on 9/23, which was c/b bleeding and required reoperation 9/28 for I&D of hematoma.  Cultures were positive for Pseudomonas aeruginosa, so he was treated with 4 weeks of cipro per Transplant ID and CVTS.  He developed leukopenia and moderate neutropenia, so his cellcept was held then restarted at low dose, and valcyte was stopped.  He received neupogen x 1.  He ultimately discharged with a wound vac.  - 12/9/20:  ED visit for right vision loss, and found to have a vitreous hemorrhage in his right eye and total retinal detachment of his left eye per ophthalmology.  He was then seen in the eye clinic 12/10, where he was advised an Avastin injection in the right eye (for proliferative diabetic retinopathy and vitreous hemorrhage).  He then underwent \"27 gauge pars plana vitectomy, membrane peel, " "perfluoroctane liquid (PFO), retinectomy, endolaser\" 12/21 for the tractional retinal detachment of his left eye.  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  Last RHC:  7/2023 showed elevated biventricular filling pressures, with RA 14, mPA 30, PCW 22, and CI 3.4.  Echo/cMRI: cMRI 9/2022 showed normal biventricular function (LVEF 74%, RVEF 59%) and was negative for ischemia but revealed a new foci of mesocardial/epicardial LGE in the midventricular inferoseptum and anterior segments in a nonischemic pattern.  TTE 7/2023 showed stable graft function, with LVEF 55-60% and normal RV size/function.     Serostatus:  - CMV D+/R+  - EBV D+/R+  - Toxo D+/R-     Immunosuppression:  - Sirolimus, goal level 6-8.  Level 10/18 was 6.2.  - MMF 1500mg twice daily     PPx:  - CAV:  Aspirin 81mg daily and rosuvastatin 20mg daily  - GI:  n/a  - Osteoporosis:  Calcium/vitamin D supplements  - Toxo:  restarting single strength bactrim three times per week (qMWF), lifelong ppx     Graft function:  - BPs: Controlled, lisinopril was held at recent hospital discharge.  Asked that he call if BPs persist > 130/90.  - fluid status:  Euvolemic, Lasix was held at recent hospital discharge.  Asked that he call with any signs of fluid retention/ongoing weight gain.     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  Asked that he call with worsening symptoms.     Hypertriglyceridemia  Triglycerides have been elevated, up to 300s since transplant.  Last triglyceride level " "was 178 (7/2023).  Continue rosuvastatin as above.    DMII  Follows with endo and PCP.  Jardiance was held during recent hospitalization due to DAYA and diarrhea.  He continues to have diarrhea, so would defer restarting at this time.  Defer management to Endo/PCP.     HIT  HIT antibody + 7/2020.  CORRINE +.  No heparin products.     RIJ and axillary vein DVT, nonocclusive  Right cephalic vein occlusive thrombus  S/p course of anticoagulation.     Total retinal detachment of the left eye, s/p retinectomy 12/21/20  Proliferative diabetic retinopathy  Right vitreous hemorrhage  He presented to the ED 12/9/20 with right vision loss.  Ophthalmology was consulted, and found that he had a vitreous hemorrhage in his right eye and total retinal detachment of his left eye.  He was then seen in the eye clinic 12/10/20, where he was advised an Avastin injection in the right eye (for proliferative diabetic retinopathy and vitreous hemorrhage).  He then underwent \"27 gauge pars plana vitectomy, membrane peel, perfluoroctane liquid (PFO), retinectomy, endolaser\" 12/21/20 for the tractional retinal detachment of his left eye.  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  He continues to follow with ophthalmology, and notes improvement in his right-eye vision.         The above was reviewed with  Tee, who verbalized understanding and will call with further questions/concerns.    55 minutes spent with patient, along with preparing for visit, reviewing follow up, and completing documentation.      Arlin Guajardo, SONA, [facility]-BC  Advanced Heart Failure Nurse Practitioner  [facility]  Patient Care Team:  No Ref-Primary, Physician as PCP - Diane Groves APRN CNP as Nurse Practitioner (Cardiology)  Arvin Sheridan MD as MD (Cardiology)  Yue Dowd MD as MD (INTERNAL MEDICINE - ENDOCRINOLOGY, DIABETES & " METABOLISM)  Christelle Pettit, RN as Transplant Coordinator (Cardiology)  Negrito Rai [facility] as Pharmacist (Pharmacist)  Care, [facility] ([facility] ([facility] ([place]))  Triny Bunch MD as Assigned Surgical Provider  Marisabel Prieto PA-C as Assigned Endocrinology Provider  Arlin Guajardo NP as Referring Physician (Interventional Cardiology)  Triny Bunch MD as MD (Ophthalmology)  Fracisco Casiano MD as Assigned PCP  Arvin Sheridan MD as Referring Physician (Cardiovascular Disease)  Cristian Delacruz MD as MD (Dermatology)  Cristian Delacruz MD as MD (Dermatology)  Mónica Shelton MD as MD (Nephrology)  Mónica Shelton MD as Assigned Nephrology Provider  Arvin Sheridan MD as Assigned Heart and Vascular Provider  Xiao Gimenez, RN as Specialty Care Coordinator (Nephrology)

## 2023-10-25 NOTE — NURSING NOTE
Chief Complaint   Patient presents with    Follow Up     Return Heart Transplant        Vitals were taken, medications reconciled.    Clinton Carreon, Facilitator   10:21 AM

## 2023-10-25 NOTE — LETTER
10/25/2023      RE: Lucian Oliveira  [address] [facility] [address]       Dear Colleague,    Thank you for the opportunity to participate in the care of your patient, Lucian Oliveira, at the [facility] [place] [facility]. Please see a copy of my visit note below.    ADULT HEART TRANSPLANT CLINIC  October 25, 2023    HPI:   Mr. Lucian Oliveira is a 68yr old male with a history of CAD (s/p 3v CABG 2008), ICM s/p OHT 7/19/20, DMII, CKD, and HTN who presents to clinic for follow-up of recent hospitalization.  A professional  was used for this visit.  A professional  was used via telephone for this visit.    He presented to the ED on 10/11 with hypotension, headache, and a 3-week history of diarrhea.  His blood pressure was 87/55.  Labs revealed a creatinine of 5.77 and CO2 of 9, so he was admitted for further management.  He was found to be +CDiff, which was thought to be chronic colonization as the toxin was negative.  He was also found to be  +norovirus.  CMV negative.  CT ab/pelvis was negative for colitis.  He was treated with a sodium bicarb infusion and IV fluids, with improvement in his DAYA and metabolic acidosis.  He was treated with a 10-day course of azithromycin, and his MMF was briefly decreased to 1000mg BID given his acute infection.  His Lasix was held, and he discharged 11/15.      Transplant-related history:  His post-operative course was c/b primary graft dysfunction (LVEF 20-25% and severe RV dysfunction, thought to be secondary to prolonged ischemic time, resolved by f/up TTE 7/27/20), VT, pericardial effusion (conservatively managed with resolution), donor streptococcus salivarius bacteremia (s/p 7d course of ceftriaxone), and RUE DVT c/b HIT (s/p PLEX).  He ultimately discharged 8/3/20.     He has been readmitted as follows:  - 8/24/20-8/28/20:  hyperglycemia (BGs 500s), thought to be  "secondary to incorrect home insulin administration  - 9/22/20-10/5/20:  fevers and drainage from former ICD site, with Chest/abd CT concerning for cellulitis in the epigastric region.  He underwent surgical debridement of his left infraclavicular wound and debridement of midline chest tube wound on 9/23, which was c/b bleeding and required reoperation 9/28 for I&D of hematoma.  Cultures were positive for Pseudomonas aeruginosa, so he was treated with 4 weeks of cipro per Transplant ID and CVTS.  He developed leukopenia and moderate neutropenia, so his cellcept was held then restarted at low dose, and valcyte was stopped.  He received neupogen x 1.  He ultimately discharged with a wound vac.  - 12/9/20:  ED visit for right vision loss, and found to have a vitreous hemorrhage in his right eye and total retinal detachment of his left eye per ophthalmology.  He was then seen in the eye clinic 12/10, where he was advised an Avastin injection in the right eye (for proliferative diabetic retinopathy and vitreous hemorrhage).  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  Last RHC:  7/2023 showed elevated biventricular filling pressures, with RA 14, mPA 30, PCW 22, and CI 3.4.  Echo/cMRI: cMRI 9/2022 showed normal biventricular function (LVEF 74%, RVEF 59%) and was negative for ischemia but revealed a new foci of mesocardial/epicardial LGE in the midventricular inferoseptum and anterior segments in a nonischemic pattern.  TTE 7/2023 showed stable graft function, with " LVEF 55-60% and normal RV size/function.    Since discharge, he states that he feels better overall.  He is not exercising, but feels like his strength and endurance remain well, and states that he is not weak.  His breathing has been well, and he denies shortness of breath, PND, and orthopnea.  He has been eating small amounts, regularly throughout the day.  He denies nausea and vomiting.  He continues to have diarrhea, which has improved, and is down to 1-2 episodes per day.  His weight is up, he was 168# this morning, and notes that he was 160#at discharge.  He does not feel any fluid retention.  He is still holding Lasix.  His blood pressures remained stable, ranging 110-120/70s, and he continues to hold lisinopril.  He has had headaches since the diarrhea started, which have improved.  He otherwise denies chest pain, palpitations, dizziness, falls, acute vision changes, fevers, chills, cough, sore throat, and signs of bleeding.      Serostatus:  CMV D+/R+  EBV D+/R+  Toxo D+/R-    Patient Active Problem List    Diagnosis Date Noted     Acute renal failure superimposed on chronic kidney disease, unspecified acute renal failure type, unspecified CKD stage  10/11/2023     Priority: Medium     Lipid screening 07/17/2023     Priority: Medium     Prostate cancer screening 07/17/2023     Priority: Medium     Type 2 diabetes mellitus with diabetic nephropathy, with long-term current use of insulin (H) 07/17/2023     Priority: Medium     Nuclear senile cataract of right eye 01/25/2023     Priority: Medium     Added automatically from request for surgery [id number]       Acute embolism and thrombosis of right axillary vein (H) 03/02/2022     Priority: Medium     Atrial flutter, unspecified type (H) 12/21/2021     Priority: Medium     Immunodeficiency, unspecified (H24) 02/02/2021     Priority: Medium     CKD (chronic kidney disease) stage 4, GFR 15-29 ml/min (H) 02/02/2021     Priority: Medium     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) 02/02/2021     Priority: Medium     Traction detachment of left retina 12/14/2020     Priority: Medium     Added automatically from request for surgery [id number]       Need for prophylactic antibiotic 10/05/2020     Priority: Medium     Sepsis (H) 09/22/2020     Priority: Medium     Heart transplant, orthotopic, status (H) 07/20/2020     Priority: Medium     Dental caries 07/03/2020     Priority: Medium     Added automatically from request for surgery [id number]       Heart replaced by transplant (H) 07/03/2020     Priority: Medium     Added automatically from request for surgery [id number]       Status post coronary angiogram 07/02/2020     Priority: Medium     Coronary artery disease involving native coronary artery of native heart without angina pectoris 06/18/2020     Priority: Medium     Added automatically from request for surgery [id number]       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, unspecified whether long term insulin use (H) 11/27/2019     Priority: Medium     Added automatically from request for surgery [id number]       Nuclear cataract, nonsenile 11/27/2019     Priority: Medium     Added automatically from request for surgery [id number]       CHANTEL (obstructive sleep apnea)- severe (AHI 30) 10/06/2016     Priority: Medium     Study Date: 10/03/2016- (196.0 lbs) apnea/hypopnea index 30.8. REM AHI 40,  supine AHI n/a. RDI  33.1 . Lowest oxygen saturation 82.8%.  Time spent less than or equal to 88% 4.9 minutes.  Time spent less than or equal to 89% 7.3 minutes. CPAP final  pressure 7 cmH2O with AHI of 0.8.  Time in REM supine on final pressure 0 minutes. No consolidated sleep seen in supine position. During diagnostic portion of study, PLM index 18.2. During treatment portion of study,  PLM index 19.3.             Moderate nonproliferative diabetic retinopathy, without macular edema, associated with type 2 diabetes mellitus 08/16/2016     Priority:  Medium     Cortical cataract of both eyes 08/15/2016     Priority: Medium     Secondary renal hyperparathyroidism (H24) 07/26/2016     Priority: Medium     Proteinuria 03/18/2016     Priority: Medium     Vitamin D deficiency 03/18/2016     Priority: Medium     OA (osteoarthritis) of knee 03/18/2016     Priority: Medium     Chondromalacia of patella, unspecified laterality 03/18/2016     Priority: Medium     Pain in joint of left shoulder 03/18/2016     Priority: Medium     Tinnitus 03/18/2016     Priority: Medium     left        Ptosis of right eyelid 03/18/2016     Priority: Medium     Chronic systolic heart failure (H)      Priority: Medium     Echo 7/2015 LVEF 18%       Automatic implantable cardioverter-defibrillator - [employer] single lead ICD, Not Dependent 08/18/2015     Priority: Medium     Health Care Home 01/19/2015     Priority: Medium     *See Letters for [facility] Care Plan: Emergency Care Plan         CKD (chronic kidney disease) stage 3, GFR 30-59 ml/min (H) 01/28/2013     Priority: Medium     CHF (NYHA class II, ACC/AHA stage C) (H) 08/15/2012     Priority: Medium     Hypertension goal BP (blood pressure) < 140/90 01/21/2011     Priority: Medium     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) 10/31/2010     Priority: Medium     Goal <8%       Hyperlipidemia LDL goal <100 10/31/2010     Priority: Medium     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) 03/15/2010     Priority: Medium     MI and open heart with 1 stent placed in 2008; another minor MI in 2009.  MI on 2/19/15. Coronary angiogram from [facility] on 2/19/15 showed severe 3 vessel native CAD (all three vessels [LAD, LCx and RCA] reported to be 100% ocludded at their ostia). All his grafts were considered to be occluded except for LIMA-to-LAD, which was patent and supplied left-to-left, as well as, left-to-right collaterals. There were no  targets suitable for revascularization and patient was put on medical therapy.          PAST MEDICAL HISTORY:  Past Medical History:   Diagnosis Date     CAD (coronary artery disease)      CHF (congestive heart failure) (H)      CKD (chronic kidney disease), stage III (H)      Cortical cataract of both eyes      Diabetes (H)      Hyperlipidemia      Hypertension      Infection due to Streptococcus mitis group 09/23/2020     Ischemic cardiomyopathy      Obesity      CHANTEL (obstructive sleep apnea)     occas cpap     CHANTEL (obstructive sleep apnea)- severe (AHI 30)      Osteoarthritis        CURRENT MEDICATIONS:  Prescription Medications as of 10/25/2023         Rx Number Disp Refills Start End Last Dispensed Date Next Fill Date Owning Pharmacy    acetaminophen (TYLENOL) 325 MG tablet  60 tablet 3 9/2/2020    [facility] [address] [place] [address] [place]    Sig: Take 3 tablets (975 mg) by mouth every 8 hours as needed for mild pain    Class: E-Prescribe    Route: Oral    Alcohol Swabs PADS  100 each 0 8/3/2020    [address]    Sig: Use to swab the area of the injection or sergio as directed   Per insurance coverage    Class: Local Print    aspirin (ASA) 81 MG chewable tablet  120 tablet 0 10/6/2020    [address]    Sig: Take 1 tablet (81 mg) by mouth daily    Class: E-Prescribe    Route: Oral    azithromycin (ZITHROMAX) 500 MG tablet  14 tablet 0 10/15/2023    [address]    Sig: Take 1 tablet (500 mg) by mouth daily    Class: E-Prescribe    Route: Oral    blood glucose (NO BRAND SPECIFIED) test strip  400 strip 3 8/24/2023    [id number] - [place], [place] - [address] AT [facility] [address]    Sig: Use to test blood sugar 4 times daily or as directed.    Class: E-Prescribe    blood glucose  monitoring (ACCU-CHEK FELIPE SMARTVIEW) meter device kit  1 kit 0 2017    [facility] [address]    Sig: Use to test blood sugar 3-4 times daily, as directed.    Class: E-Prescribe    blood glucose monitoring (SOFTCLIX) lancets  400 each 8 3/29/2022    [id number] [address] AT [address]    Si each 4 times daily Use to test blood sugars 2 times daily.    Class: E-Prescribe    Route: Does not apply    Calcium Carb-Cholecalciferol (CALCIUM CARBONATE-VITAMIN D3) 600-400 MG-UNIT TABS  180 tablet 3 2022    [facility] [address]     Sig: TAKE ONE TABLET BY MOUTH TWICE A DAY WITH MEALS    Class: E-Prescribe    Dulaglutide (TRULICITY) 4.5 MG/0.5ML SOPN  6 mL 1 2023    [facility] [address]     Sig: Inject 4.5 mg Subcutaneous once a week    Class: E-Prescribe    Route: Subcutaneous    ferrous sulfate (FEROSUL) 325 (65 Fe) MG tablet  90 tablet 3 2023    [facility] [facility]    Sig: Take 1 tablet (325 mg) by mouth daily (with breakfast)    Class: E-Prescribe    Route: Oral    insulin lispro (HUMALOG KWIKPEN) 100 UNIT/ML (1 unit dial) KWIKPEN            Sig: Inject 8-14 Units Subcutaneous 3 times daily (before meals)    Class: Historical    Route: Subcutaneous    insulin NPH (HUMULIN N KWIKPEN) 100 UNIT/ML injection  45 mL 1 10/9/2023    [facility] - [place], [place] - [address]    Sig: GIve 40 units each morning and 12 at night subcutaneous    Class: E-Prescribe    insulin pen needle (32G X 4 MM) 32G X 4 MM miscellaneous  450 each 3 2023    [id number] [address] AT [address]    Sig: Use 5-6 pen needles daily or as directed.    Class: E-Prescribe    ketorolac (ACULAR) 0.5 %  ophthalmic solution  10 mL 3 3/9/2023    [facility][id number] - [facility] [address] AT [address]    Sig: Place 1 drop into the right eye 2 times daily    Class: E-Prescribe    Route: Right Eye    latanoprost (XALATAN) 0.005 % ophthalmic solution            Sig: Place 1 drop into both eyes at bedtime    Class: Historical    Route: Both Eyes    magnesium oxide 400 MG tablet  180 tablet 1 6/20/2023    [facility] - [place], [place] - [address]    Sig: Take 1 tablet (400 mg) by mouth 2 times daily    Class: E-Prescribe    Route: Oral    mycophenolate (GENERIC EQUIVALENT) 500 MG tablet  180 tablet 11 6/15/2023    [facility] - [place], [place] - [address]     Sig: Take 3 tablets (1,500 mg) by mouth 2 times daily    Class: E-Prescribe    Notes to Pharmacy: TXP DT 7/19/2020 (Heart) TXP Dischg DT 8/3/2020 DX Heart transplant Z94.1 [facility] ([place], [place])    Route: Oral    omega-3 acid ethyl esters (LOVAZA) 1 g capsule  90 capsule 3 7/14/2023    [facility] - [place], [place] - [address]    Sig: Take 1 capsule (1 g) by mouth daily    Class: E-Prescribe    Route: Oral    prednisoLONE acetate (PRED FORTE) 1 % ophthalmic suspension  10 mL 3 3/9/2023    [id number] [address] AT [address]    Sig: Place 1 drop into the right eye 2 times daily    Class: E-Prescribe    Route: Right Eye    rosuvastatin (CRESTOR) 20 MG tablet  90 tablet 3 2/24/2023    [id number] - [facility] [address] AT [address]    Sig: Take 1 tablet (20 mg) by mouth daily    Class: E-Prescribe    Route: Oral    senna-docusate (SENOKOT-S/PERICOLACE) 8.6-50 MG tablet  60 tablet 3 12/1/2021    [facility] - [place], [place] - [address]    Sig: Take 1 tablet by mouth 2  times daily as needed (hold for loose stools)    Class: E-Prescribe    Route: Oral    sirolimus (GENERIC EQUIVALENT) 0.5 MG tablet  180 tablet 11 7/17/2023    [facility] - [place] [address]    Sig: Take 6 tablets (3 mg) by mouth daily    Class: E-Prescribe    Notes to Pharmacy: TXP DT 7/19/2020 (Heart) TXP Dischg DT 8/3/2020 DX Heart transplant Z94.1 [facility] ([place], [place]) Dose adjustment    Route: Oral    No prior authorization was found for this prescription.    Found prior authorization for another prescription for the same medication: Canceled - Other (The medication order is discontinued.)    tamsulosin (FLOMAX) 0.4 MG capsule  90 capsule 3 9/2/2022    [facility] - [place] [address]    Sig: TAKE ONE CAPSULE BY MOUTH ONCE DAILY    Class: E-Prescribe            ROS:   A comprehensive ROS was completed and found to be negative except for that noted in the HPI.    Exam:  /74 (BP Location: Right arm, Patient Position: Chair, Cuff Size: Adult Large)   Pulse 92   Wt 74.7 kg (164 lb 9.6 oz)   SpO2 99%   BMI 25.78 kg/m    In general, the patient is a pleasant male in no apparent distress.    HEENT: NC/AT. PERRLA. EOMI.  Sclerae white, not injected.    Neck:  No adenopathy, No thyromegaly.    COR: No jugular venous distention when sitting upright.  RRR.  Normal S1 S2 splits physiologically.  No murmur, rub click, or gallop.    Lungs:  CTA. No rhonchi.    Abdomen: soft, nontender, nondistended.  No organomegaly.  Extremities:  No clubbing, cyanosis, or LE edema.    Neuro: Alert & Oriented x 3, grossly non focal.  Integument: no open lesions, rashes, or jaundice.    Labs:  CBC RESULTS:   Lab Results   Component Value Date    WBC 3.3 (L) 10/18/2023    WBC 5.1 05/11/2021    RBC 3.00 (L) 10/18/2023    RBC 4.47 05/11/2021    HGB 8.3 (L) 10/18/2023    HGB 13.4 05/11/2021    HCT 24.9 (L)  "10/18/2023    HCT 41.5 05/11/2021    MCV 83 10/18/2023    MCV 93 05/11/2021    MCH 27.7 10/18/2023    MCH 30.0 05/11/2021    MCHC 33.3 10/18/2023    MCHC 32.3 05/11/2021    RDW 14.0 10/18/2023    RDW 13.2 05/11/2021     (L) 10/18/2023     05/11/2021       BMP RESULTS:  Lab Results   Component Value Date     (L) 10/18/2023     05/11/2021    POTASSIUM 3.4 10/18/2023    POTASSIUM 4.4 03/14/2022    POTASSIUM 4.0 05/11/2021    CHLORIDE 102 10/18/2023    CHLORIDE 109 03/14/2022    CHLORIDE 107 05/11/2021    CO2 20 (L) 10/18/2023    CO2 26 03/14/2022    CO2 27 05/11/2021    ANIONGAP 12 10/18/2023    ANIONGAP 6 03/14/2022    ANIONGAP 6 05/11/2021     (H) 10/18/2023     (H) 10/15/2023     (H) 03/14/2022    GLC 98 05/11/2021    BUN 52.0 (H) 10/18/2023    BUN 29 03/14/2022    BUN 34 (H) 05/11/2021    CR 2.19 (H) 10/18/2023    CR 1.84 (H) 05/11/2021    GFRESTIMATED 32 (L) 10/18/2023    GFRESTIMATED 37 (L) 05/11/2021    GFRESTBLACK 43 (L) 05/11/2021    BRYANNA 8.2 (L) 10/18/2023    BRYANNA 9.0 05/11/2021      LIPID RESULTS:  Lab Results   Component Value Date    CHOL 92 07/17/2023    CHOL 133 01/05/2021    HDL 30 (L) 07/17/2023    HDL 40 01/05/2021    LDL 26 07/17/2023    LDL 58 01/05/2021    TRIG 178 (H) 07/17/2023    TRIG 179 (H) 01/05/2021    CHOLHDLRATIO 2.6 06/27/2015    NHDL 62 07/17/2023    NHDL 94 01/05/2021       IMMUNOSUPPRESSANT LEVELS  Lab Results   Component Value Date    TACROL 4.5 (L) 02/14/2023    TACROL 5.6 05/11/2021    DOSTAC 2/13/2023 02/14/2023    DOSTAC  7pm 5/10/21 05/11/2021    RAPAMY 6.2 10/18/2023       No components found for: \"CK\"  Lab Results   Component Value Date    MAG 1.9 10/15/2023    MAG 1.8 05/11/2021     Lab Results   Component Value Date    A1C 8.7 (H) 07/17/2023    A1C 6.7 (H) 01/14/2021     Lab Results   Component Value Date    PHOS 3.6 10/13/2023    PHOS 3.2 05/11/2021     Lab Results   Component Value Date    NTBNPI 8,792 (H) 09/22/2020     Lab " Results   Component Value Date    SAITESTMET SA EDTA FCS 07/17/2023    SAITESTMET  FCS 01/05/2021    SAICELL Class I 07/17/2023    SAICELL Class I 01/05/2021    [id number] None 07/17/2023    LZ2KQAOPA None 01/05/2021    LK2XWSHICJ None 07/17/2023    [id number] None 01/05/2021    SAIREPCOM  07/17/2023      HLA PRA Test performed by modified testing procedure that may also include pretreatment of serum. Pretreatment may be the addition of fetal calf serum, EDTA, and/or adsorption.  High-risk, MFI > 3,000.  Mod-risk, -3,000.    SAIREPCOM  01/05/2021      Test performed by modified procedure. Serum heat inactivated and tested   by a modified ([place]) protocol including fetal calf serum addition.   High-risk, mfi >3,000. Mod-risk, mfi 500-3,000.       Lab Results   Component Value Date    SAIITESTME SA EDTA FCS 07/17/2023    SAIITESTME [facility] 01/05/2021    SAIICELL Class II 07/17/2023    SAIICELL Class II 01/05/2021    [id number] None 07/17/2023    GN4RWDQES None 01/05/2021    [id number] None 07/17/2023    [id number] None 01/05/2021    SAIIREPCOM  07/17/2023      HLA PRA Test performed by modified testing procedure that may also include pretreatment of serum. Pretreatment may be the addition of fetal calf serum, EDTA, and/or adsorption.  High-risk, MFI > 3,000.  Mod-risk, -3,000.    SAIIREPCOM  01/05/2021      Test performed by modified procedure. Serum heat inactivated and tested   by a modified ([place]) protocol including fetal calf serum addition.   High-risk, mfi >3,000. Mod-risk, mfi 500-3,000.       Lab Results   Component Value Date    CSPEC EDTA PLASMA 05/11/2021       Assessment and Plan:  Mr. Lucian Oliveira is a 68yr old male with a history of CAD (s/p 3v CABG 2008), ICM s/p OHT 7/19/20, DMII, CKD, and HTN who presents to clinic for follow-up of recent hospitalization.  A professional  was used for this visit.  A professional  was used via telephone for this visit.    Labs today  show stable electrolytes, improving renal function, stable liver function, and stable blood counts.    Mr. Oliveira appears stable today.  His weight is up slightly, but he appears euvolemic., off diuretics since discharge.  Asked that he call if his weight were to continue to rise, as we would reconsider his diuresis plan.    His blood pressures remain controlled, off lisinopril since discharge.  Asked that he call if his blood pressures were to persist > 130/90, as we would then reintroduce lisinopril.    As his renal function has improved, we will plan to restart Bactrim for toxo PPx -- advised that he restart single strength thrice weekly, on Mondays, Wednesdays, and Fridays.  We will plan for him to repeat a BMP in 2 weeks to follow-up this change.    We will plan for him to follow-up in 2 weeks, or sooner should new concerns arise.      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  He ultimately discharged 8/3/20.     He has been readmitted as follows:  - 8/24/20-8/28/20:  hyperglycemia (BGs 500s), thought to be secondary to incorrect home insulin administration  - 9/22/20-10/5/20:  fevers and drainage from former ICD site, with Chest/abd CT concerning for cellulitis in the epigastric region.  He underwent surgical debridement of his left infraclavicular wound and debridement of midline chest tube wound on 9/23, which was c/b bleeding and required reoperation 9/28 for I&D of hematoma.  Cultures were positive for Pseudomonas aeruginosa, so he was treated with 4 weeks of cipro per Transplant ID and CVTS.  He developed leukopenia and moderate neutropenia, so his cellcept was held then  "restarted at low dose, and valcyte was stopped.  He received neupogen x 1.  He ultimately discharged with a wound vac.  - 12/9/20:  ED visit for right vision loss, and found to have a vitreous hemorrhage in his right eye and total retinal detachment of his left eye per ophthalmology.  He was then seen in the eye clinic 12/10, where he was advised an Avastin injection in the right eye (for proliferative diabetic retinopathy and vitreous hemorrhage).  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  Last RHC:  7/2023 showed elevated biventricular filling pressures, with RA 14, mPA 30, PCW 22, and CI 3.4.  Echo/cMRI: cMRI 9/2022 showed normal biventricular function (LVEF 74%, RVEF 59%) and was negative for ischemia but revealed a new foci of mesocardial/epicardial LGE in the midventricular inferoseptum and anterior segments in a nonischemic pattern.  TTE 7/2023 showed stable graft function, with LVEF 55-60% and normal RV size/function.     Serostatus:  - CMV D+/R+  - EBV D+/R+  - Toxo D+/R-     Immunosuppression:  - Sirolimus, goal level 6-8.  Level 10/18 was 6.2.  - MMF 1500mg twice daily     PPx:  - CAV:  Aspirin 81mg daily and rosuvastatin 20mg daily  - GI:  n/a  - Osteoporosis:  Calcium/vitamin D supplements  - Toxo:  restarting single strength bactrim three times per week (qMWF), lifelong ppx     Graft function:  - BPs: Controlled, lisinopril was held at recent hospital discharge.  Asked that he call if BPs persist > 130/90.  - fluid status:  Euvolemic, Lasix was " "held at recent hospital discharge.  Asked that he call with any signs of fluid retention/ongoing weight gain.     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  Asked that he call with worsening symptoms.     Hypertriglyceridemia  Triglycerides have been elevated, up to 300s since transplant.  Last triglyceride level was 178 (7/2023).  Continue rosuvastatin as above.    DMII  Follows with endo and PCP.  Jardiance was held during recent hospitalization due to DAYA and diarrhea.  He continues to have diarrhea, so would defer restarting at this time.  Defer management to Endo/PCP.     HIT  HIT antibody + 7/2020.  CORRINE +.  No heparin products.     RIJ and axillary vein DVT, nonocclusive  Right cephalic vein occlusive thrombus  S/p course of anticoagulation.     Total retinal detachment of the left eye, s/p retinectomy 12/21/20  Proliferative diabetic retinopathy  Right vitreous hemorrhage  He presented to the ED 12/9/20 with right vision loss.  Ophthalmology was consulted, and found that he had a vitreous hemorrhage in his right eye and total retinal detachment of his left eye.  He was then seen in the eye clinic 12/10/20, where he was advised an Avastin injection in the right eye (for proliferative diabetic retinopathy and vitreous hemorrhage).  He then underwent \"27 gauge pars plana vitectomy, membrane peel, perfluoroctane liquid (PFO), retinectomy, endolaser\" 12/21/20 for the tractional retinal detachment of his left eye.  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  He continues to follow with ophthalmology, and notes improvement in his right-eye vision.         The above was reviewed with Mr. Oliveira, who verbalized understanding and " will call with further questions/concerns.    55 minutes spent with patient, along with preparing for visit, reviewing follow up, and completing documentation.      Arlin Guajardo DNP, FNP-BC  Advanced Heart Failure Nurse Practitioner  [facility]  Patient Care Team:  No Ref-Primary, Physician as PCP - Diane Groves APRN CNP as Nurse Practitioner (Cardiology)  Arvin Sheridan MD as MD (Cardiology)  Yue Dowd MD as MD (INTERNAL MEDICINE - ENDOCRINOLOGY, DIABETES & METABOLISM)  Christelle Pettit, RN as Transplant Coordinator (Cardiology)  Negrito Rai [facility] as Pharmacist (Pharmacist)  Care, [facility] ([facility] ([facility] ([place]  Triny Bunch MD as Assigned Surgical Provider  Marisabel Prieto PA-C as Assigned Endocrinology Provider  Arlin Guajardo NP as Referring Physician (Interventional Cardiology)  Triny Bunch MD as MD (Ophthalmology)  Fracisco Casiano MD as Assigned PCP  Arvin Sheridan MD as Referring Physician (Cardiovascular Disease)  Cristian Delacruz MD as MD (Dermatology)  Cristian Delacruz MD as MD (Dermatology)  Mónica Shelton MD as MD (Nephrology)  Mónica Shelton MD as Assigned Nephrology Provider  Arvin Sheridan MD as Assigned Heart and Vascular Provider  Xiao Gimenez, RN as Specialty Care Coordinator (Nephrology)      Please do not hesitate to contact me if you have any questions/concerns.     Sincerely,     Arlin Guajardo NP

## 2023-10-26 ENCOUNTER — OFFICE VISIT (OUTPATIENT)
Dept: OPHTHALMOLOGY | Facility: CLINIC | Age: 68
End: 2023-10-26
Attending: OPHTHALMOLOGY
Payer: COMMERCIAL

## 2023-10-26 DIAGNOSIS — E11.3513 TYPE 2 DIABETES MELLITUS WITH PROLIFERATIVE RETINOPATHY OF BOTH EYES AND MACULAR EDEMA, UNSPECIFIED WHETHER LONG TERM INSULIN USE (H): ICD-10-CM

## 2023-10-26 PROCEDURE — 99207 FUNDUS PHOTOS OU (BOTH EYES): CPT | Mod: 26 | Performed by: OPHTHALMOLOGY

## 2023-10-26 PROCEDURE — 92081 LIMITED VISUAL FIELD XM: CPT | Performed by: OPHTHALMOLOGY

## 2023-10-26 PROCEDURE — 92250 FUNDUS PHOTOGRAPHY W/I&R: CPT | Performed by: OPHTHALMOLOGY

## 2023-10-26 PROCEDURE — G0463 HOSPITAL OUTPT CLINIC VISIT: HCPCS | Mod: 25 | Performed by: OPHTHALMOLOGY

## 2023-10-26 PROCEDURE — 92134 CPTRZ OPH DX IMG PST SGM RTA: CPT | Performed by: OPHTHALMOLOGY

## 2023-10-26 PROCEDURE — 92235 FLUORESCEIN ANGRPH MLTIFRAME: CPT | Performed by: OPHTHALMOLOGY

## 2023-10-26 PROCEDURE — 67228 TREATMENT X10SV RETINOPATHY: CPT | Mod: RT | Performed by: OPHTHALMOLOGY

## 2023-10-26 PROCEDURE — 99214 OFFICE O/P EST MOD 30 MIN: CPT | Mod: 25 | Performed by: OPHTHALMOLOGY

## 2023-10-26 ASSESSMENT — TONOMETRY
OS_IOP_MMHG: 10
OD_IOP_MMHG: 15
IOP_METHOD: TONOPEN

## 2023-10-26 ASSESSMENT — CONF VISUAL FIELD
OS_SUPERIOR_TEMPORAL_RESTRICTION: 1
OS_INFERIOR_NASAL_RESTRICTION: 1
OD_INFERIOR_NASAL_RESTRICTION: 0
OD_SUPERIOR_TEMPORAL_RESTRICTION: 0
OD_SUPERIOR_NASAL_RESTRICTION: 0
OD_INFERIOR_TEMPORAL_RESTRICTION: 0
OS_SUPERIOR_NASAL_RESTRICTION: 1
OS_INFERIOR_TEMPORAL_RESTRICTION: 1
OD_NORMAL: 1

## 2023-10-26 ASSESSMENT — VISUAL ACUITY
OD_CC: 20/25
OS_SC: HM
OD_CC+: -3
OS_CC: CF @ 2'
OD_SC+: -2
OD_SC: 20/40
CORRECTION_TYPE: GLASSES
METHOD: SNELLEN - LINEAR

## 2023-10-26 ASSESSMENT — SLIT LAMP EXAM - LIDS
COMMENTS: NORMAL
COMMENTS: NORMAL

## 2023-10-26 ASSESSMENT — EXTERNAL EXAM - RIGHT EYE: OD_EXAM: NORMAL

## 2023-10-26 ASSESSMENT — REFRACTION_WEARINGRX
OS_SPHERE: BALANCE
OD_CYLINDER: +1.00
OD_SPHERE: -0.50
OD_ADD: +2.50
OD_AXIS: 017

## 2023-10-26 ASSESSMENT — CUP TO DISC RATIO: OD_RATIO: 0.25

## 2023-10-26 ASSESSMENT — EXTERNAL EXAM - LEFT EYE: OS_EXAM: NORMAL

## 2023-10-26 NOTE — PROGRESS NOTES
CC: follow up proliferative diabetic retinopathy  Interval:  follow up for PDR each eye,  Funnel RD left eye, and Ocular Hypertension each eye. Patient feels vision is stable each eye in the last several weeks.No flashes or floaters.  Ocular meds:   - Prednisolone & ketorolac once daily in left eye only. Latanoprost both eyes once daily.     HPI: 68 year old man PMHx of CAD (s/p 3v CABG 2008), ischemic CM, now s/p orthotopic heart transplant 07/2020, CKD, HTN, HLD, obesity, CHANTEL and IDDM2 with history of PDR both eyes who presented to [facility] in 05/17/2019 with bilateral vitreous hemorrhages.      POH: PPV/MP/retinectomy OS 12/21/20 Intraoperative, found to have longstanding funnel RD  NVI 06/02/2022  CEIOL OD 2/6/2023      RETINAL IMAGING  Optical Coherence Tomography: 10/26/23   Right eye: Increased mild cystoid macular edema.   Left eye: Irregular retina; ERM; nasal to fovea with large bullous edema-largely stable    FA 10/26/23   right eye: PRP scars. Foci of NV superonasal.     optos consistent with exam 10/26/23     DVF 10/26/23   Right eye: H 110 V 75  Left eye: H only sup area of 50 degrees V70     Assessment and Plan  # Proliferative Diabetic Retinopathy, right eye    # with Diabetic macular edema right eye   Optical Coherence Tomography 10/26/23 with mild worsening of mild cystoid macular edema  Currently using   - prednisolone 1x daily left eye till finish   -Restart Ketorolac 1x daily right eye till finish    Fluorescein angiography with persistent neovascularization elsewhere   Recommend laser filling 10/26/23 no complications     Consider avastin if Diabetic macular edema worsen    # Vitreous hemorrhage, right eye -  Resolved  (onset 4/2021)   - Responded to multiple Avastin, nearly resolved 08/04/2022   - R/B/A Fill-in PRP right eye 08/24/2022: #764 spots   -  1.25.23 last Avastin right eye     # Proliferative Diabetic Retinopathy, left eye     - NVI, left eye 6/2/2022   - Gonio 6/2/2022  broad PAS superiorly ~3 clock hours, narrow PAS inferiorly <1 clock hour, no NVA   - Regressing 08/04/2022   - s/p PRP 6/5/2019   - s/p avastin last injection 06/02/2022   - 03/09/23 VA stable CF2' - continue to observe given chronic Retinal detachment   # Funnel RD left eye   - progressed to funnel RD after loss to follow up given heart surgeries     - w retina folded on itself under SO   - guarded prognosis previously discussed   - Drops as below    # s/p CEIOL OD 2/6/23.   doing well.     # Ocuar HTN, both eyes    - 06/15/23 IOP 24/20.   -07/27/23 intraocular pressure under good control on latanoprost    PLAN:   - Continue latanoprost at bedtime both eyes   - prednisolone 1x daily left eye till finish  -Restart Ketorolac 1x daily right eye till finish  Retina detachment precautions were discussed with the patient (presence or increased in flashes, floaters or a curtain in the visual field) and was asked to return if any of the those occur    RTC: Panretinal laser photocoagulation (PRP) laser filling right eye 10/26/23   2 months, dilate OU, OCT macula, Optos     Dr. Pascual Tolbert  Retina fellow    ~~~~~~~~~~~~~~~~~~~~~~~~~~~~~~~~~~   Complete documentation of historical and exam elements from today's encounter can be found in the full encounter summary report (not reduplicated in this progress note).  I personally obtained the chief complaint(s) and history of present illness.  I confirmed and edited as necessary the review of systems, past medical/surgical history, family history, social history, and examination findings as documented by others; and I examined the patient myself.  I personally reviewed the relevant tests, images, and reports as documented above.  I personally reviewed the ophthalmic test(s) associated with this encounter, agree with the interpretation(s) as documented by the resident/fellow, and have edited the corresponding report(s) as necessary.   I formulated and edited as necessary the  assessment and plan and discussed the findings and management plan with the patient and family and I was present for critical portions of the procedure carried out by the resident/fellow and immediately available for the entire procedure    Triny Bunch MD  Professor of Ophthalmology  Vitreo-Retinal surgeon   Department of Ophthalmology and Visual Neurosciences   [facility]  Phone: [phone]   Fax: [phone]

## 2023-11-01 ENCOUNTER — TELEPHONE (OUTPATIENT)
Dept: TRANSPLANT | Facility: CLINIC | Age: 68
End: 2023-11-01
Payer: COMMERCIAL

## 2023-11-01 DIAGNOSIS — Z94.1 HEART TRANSPLANT, ORTHOTOPIC, STATUS (H): Primary | ICD-10-CM

## 2023-11-03 NOTE — PROGRESS NOTES
Diabetes Consult Note  November 7, 2023        Lucian Oliveira  is a 68 year old male who has a past medical history of CAD (coronary artery disease), CHF (congestive heart failure) (H), CKD (chronic kidney disease), stage III (H), Cortical cataract of both eyes, Diabetes (H), Hyperlipidemia, Hypertension, Infection due to Streptococcus mitis group, Ischemic cardiomyopathy, Obesity, CHANTEL (obstructive sleep apnea), CHANTEL (obstructive sleep apnea)- severe (AHI 30), and Osteoarthritis. He is here for follow-up of diabetes.    He is  s/p heart transplant on 7/19/2020 and left eye retinectomy on 12/21/20.     His diabetes is also complicated by proliferative diabetic retinopathy.  He had vitreous hemorrhage in his right eye in April 2021, resolved with last atorvastatin injection 8/4/2022.  Also has undergone PRP in his right eye on 8/24 2022.  His left eye also has proliferative diabetic retinopathy and Avastin injections.     He was hospitalized in 2020 with high blood sugar while on higher dose steroids and discharged again on NPH in the morning with lispro at meals.  He has had difficulty with transitioning between insulin regimens and remained on NPH for many years.  At her last visit which was virtual, we discussed transitioning to Lantus I wanted to see them in person to do this and asked them to fill the prescription and then come to an in person meeting.  We also did discuss starting empagliflozin.    He and his wife have struggled with insulin and medication dosing due to very limited literacy in both English and Cuban.    I had been hoping to bring them into clinic for an extended appointment to attempt to transition Lucian from the NPH insulin he was started on following transplant to long and short acting insulin.      Interim:  Recently he was hospitalized October 11 through 15 acute kidney injury and anion gap metabolic acidosis secondary to extrarenal losses from norovirus and  enteroaggregate of E. coli diarrhea.  His most recent A1c was 8.7.  Regimen prior to hospitalization was noted to be Trulicity 4.5 mg weekly Humulin NPH 40 units every morning and 12 every afternoon with Humalog 12-14 per meal and Jardiance 10 mg.    Upon discharge he was advised to remain on NPH 40 units every morning, 12 units every afternoon to hold his Jardiance and Trulicity until diarrhea resolved.      Today:  A1c today is 8.1.  Justice is here today with his wife Shivani and they declined .  Visit is conducted in Persian.  He is eating very little - just one tortilla at meals.  He lost a lot of weight.  Some days he eats breakfast and lunch, but often not hungry for dinner.  At times will have some coffee but often not even this.  They have been holding his 12 units in the evening when he is skipping meals or just having coffee.  They do note that his blood sugar might be a little higher in the morning with this but he was having lows overnight when they did give it.  On a couple of occasions this his blood sugar over 250 and they did give a small amount of the Humalog insulin that they still have.  They use the sliding scale that I had previously given them, obese of 8 units for blood sugar 1 , and additional 1 unit per 30 for any blood sugars above 200.  They stated that this did not low lead to any low blood sugar except on one occasion when he did not eat anything at all.      Will usually he and Shivani exercise regularly he is slowly getting back to this as he has not had very much energy since his hospitalization.  He has gone on some nights walks around the park since discharge.  While he has decreased appetite both that his energy are slowly getting better.  He denies any stomach upset any difficulties with diarrhea.  They understood to restart the Jardiance after loose stools resolved but they wanted to wait until today.  He denies any difficulties with Trulicity including  constipation significant stomach upset.  Fortunately Shivani nor anyone else in the household became ill with his stomach bug.      Current Diabetes Medications:  Putting NPH 30 units in morning and 12 in the evening only if has supper.  If not, not giving.  On a couple of occasions when blood sugar has been over 250 in the evening they have used the previous NPH sliding scale.  Jardiance and Trulicity are being held.    Occasional evening Humalog  - 8 units +  1:30>200      We reviewed glucometer/CGMS data together.  It revealed:    FB - 169 -fasting  Evening B - 261.  Before evening meal.      History of Diabetes monitoring and complications/ prevention:  CAD: Yes  Last eye exam results: Seen ophthalmology every 6 weeks   last dental exam: Reports UTD  Kidneys: Chronic kidney disease seeing Dr. Shelton.  As noted recently hospitalized with DAYA.  GFR on 10/25/1936 creatinine 1.96.  HTN: Yes  On Statin: Yes  On Aspirin: Yes  Depression: Denies.  ED: yes    Lucian's PMH, PSH and allergies were reviewed today and pertinent information is summarized above.    Lucian's pertinent social and family history are also reviewed today and pertinent information is summarized above.      Current Outpatient Medications   Medication    acetaminophen (TYLENOL) 325 MG tablet    Alcohol Swabs PADS    aspirin (ASA) 81 MG chewable tablet    blood glucose (NO BRAND SPECIFIED) test strip    blood glucose monitoring (ACCU-CHEK FELIPE SMARTVIEW) meter device kit    blood glucose monitoring (SOFTCLIX) lancets    Calcium Carb-Cholecalciferol (CALCIUM CARBONATE-VITAMIN D3) 600-400 MG-UNIT TABS    Dulaglutide (TRULICITY) 4.5 MG/0.5ML SOPN    empagliflozin (JARDIANCE) 10 MG TABS tablet    ferrous sulfate (FEROSUL) 325 (65 Fe) MG tablet    insulin lispro (HUMALOG KWIKPEN) 100 UNIT/ML (1 unit dial) KWIKPEN    insulin NPH (HUMULIN N KWIKPEN) 100 UNIT/ML injection    insulin pen needle (32G X 4 MM) 32G X 4 MM miscellaneous    ketorolac  "(ACULAR) 0.5 % ophthalmic solution    latanoprost (XALATAN) 0.005 % ophthalmic solution    magnesium oxide 400 MG tablet    mycophenolate (GENERIC EQUIVALENT) 500 MG tablet    omega-3 acid ethyl esters (LOVAZA) 1 g capsule    prednisoLONE acetate (PRED FORTE) 1 % ophthalmic suspension    rosuvastatin (CRESTOR) 20 MG tablet    senna-docusate (SENOKOT-S/PERICOLACE) 8.6-50 MG tablet    sirolimus (GENERIC EQUIVALENT) 0.5 MG tablet    sulfamethoxazole-trimethoprim (BACTRIM) 400-80 MG tablet    tamsulosin (FLOMAX) 0.4 MG capsule     No current facility-administered medications for this visit.         ROS:   Reports good physical activity tolerance.  Denies any pedal lesions or vision changes or concerns. Denies any other acute concerns except as noted above.      Exam:    /75 (BP Location: Left arm, Patient Position: Sitting, Cuff Size: Adult Regular)   Pulse 90   Wt 78 kg (172 lb)   SpO2 100%   BMI 26.94 kg/m      Wt Readings from Last 10 Encounters:   11/07/23 78 kg (172 lb)   10/25/23 74.7 kg (164 lb 9.6 oz)   10/14/23 73 kg (160 lb 14.4 oz)   10/09/23 74.8 kg (165 lb)   07/25/23 80.3 kg (177 lb)   07/17/23 82.5 kg (181 lb 12.8 oz)   07/17/23 81.8 kg (180 lb 4.8 oz)   02/14/23 82.7 kg (182 lb 4.8 oz)   02/06/23 81.5 kg (179 lb 10.8 oz)   02/02/23 83.9 kg (185 lb)       General: Pleasant, well nourished and hydrated male in NAD.  He is accompanied by his wife and visit is conducted in Zimbabwean.  Psych:  Mood is \"good,\" affect is appropriate.  Thought form and content are fluid and coherent.    HEENT: Eyes and sclera are clear. Extraocular movements are grossly intact without proptosis.  Nares are patent, mucous membranes moist.  Neck: No masses or JVD are noted.    Resp: Easy and unlabored breathing.   Neuro: Alert and oriented, communicating clearly.   Ext: no swelling or edema.  Good distal pulses and capillary refill in digits.  Skin in good condition.  Sensation is intact to monofilament testing bilaterally " "and throughout.    Data:      Recent Labs   Lab Test 10/25/23  1005 10/18/23  1206 10/11/23  1926 10/11/23  1128 08/14/23  0917 07/28/23  1226 07/28/23  1205 07/17/23  0908 02/14/23  0821 02/02/23  1121 11/30/21  0859 11/17/21  1126 08/10/20  0845 08/05/20  0843 07/07/20  1732 07/07/20  0720 08/16/18  0834 08/06/18  0000 04/30/18  1148 04/30/18  0000   A1C  --   --   --   --   --   --   --  8.7*  --  8.3*   < >  --    < >  --   --   --    < >  --   --   --    HEMOGLOBINA1  --   --   --   --   --   --   --   --   --   --   --   --   --   --   --   --   --  8.3*  --  10.6*   TSH  --   --   --   --   --   --   --   --   --   --   --   --   --  0.80  --  1.30   < >  --   --   --    LDL  --   --   --   --   --   --   --  26  --  14   < >  --    < >  --   --   --    < >  --   --   --    HDL  --   --   --   --   --   --   --  30*  --  32*   < >  --    < >  --   --   --    < >  --   --   --    TRIG  --   --   --   --   --   --   --  178*  --  365*   < >  --    < >  --   --   --    < >  --   --   --    CR 1.96* 2.19*   < > 5.77*   < >  --    < > 1.95*   < > 2.26*   < >  --    < > 1.70*   < >  --    < >  --    < >  --    MICROL  --   --   --   --   --  21.4  --   --   --   --   --  146  --   --   --   --    < >  --   --   --    AST 17  --   --  22  --   --   --  24  --  23   < >  --    < >  --    < >  --    < >  --   --   --    ALT 13  --   --  20  --   --   --  17  --  19   < >  --    < >  --    < >  --    < >  --   --   --     < > = values in this interval not displayed.           Most recent GFRs:    Lab Results   Component Value Date    GFRESTIMATED 36 (L) 10/25/2023    GFRESTIMATED 32 (L) 10/18/2023    GFRESTIMATED 27 (L) 10/15/2023    GFRESTBLACK 43 (L) 05/11/2021    GFRESTBLACK 43 (L) 03/02/2021    GFRESTBLACK 48 (L) 01/14/2021       No results found for: \"CPEPT\", \"GADAB\", \"ISCAB\"  FIB-4 Calculation: 1.98 at 10/25/2023 10:05 AM  Calculated from:  SGOT/AST: 17 U/L at 10/25/2023 10:05 AM  SGPT/ALT: 13 U/L at 10/25/2023 " 10:05 AM  Platelets: 167 10e3/uL at 10/25/2023 10:05 AM  Age: 68 years  AST   Date Value Ref Range Status   10/25/2023 17 0 - 45 U/L Final     Comment:     Reference intervals for this test were updated on 6/12/2023 to more accurately reflect our healthy population. There may be differences in the flagging of prior results with similar values performed with this method. Interpretation of those prior results can be made in the context of the updated reference intervals.   05/11/2021 <3 0 - 45 U/L Final     Lab Results   Component Value Date    ALT 13 10/25/2023    ALT 18 05/11/2021             Assessment/Plan:    Lucian Oliveira is a 68 year old male with a long history of type 2 diabetes which was exacerbated following heart transplant in 2020.  He is here today following recent hospitalization for DAYA and metabolic acidosis following norovirus and E. coli infections.      1.  Type 2 diabetes:  His fasting blood sugars are reasonable, he is experiencing some hyperglycemia later in the day.  We discussed resuming his Jardiance and he will go ahead and do this.  Reminded that he needs to hold this if he should have any loose stools nausea or vomiting or fever.  We discussed resuming Trulicity.  We discussed that he should not do this for another month or so and only if his blood sugars are still frequently above 200.  We discussed the possibility of starting with a lower dose of Trulicity but he reports he has never had any difficulty with this and would like to try resuming at 3 mg though if he has significant abdominal pain they will call us before taking a second dose.    He is referred to diabetes education as he may need possible titration of his insulin doses prior to his next visit.  They can also assess whether or not he and Shivani might transition to long and short acting insulin, though it would be nice if he continued to take primarily just NPH, possibly with a Humalog correction scale before dinner if  that were needed.  He previously has used a NPH sliding scale which they state they still have at home.    2. DAYA: As noted above he was resume his low-dose Jardiance and follow-up with nephrology in 3 weeks.     3. DM Complications-   He will continue to see cardiology ophthalmology and nephrology regularly.  He denies any difficulties with depressed mood at this time, though he does endorse fatigue.    40  minutes spent on the date of the encounter doing chart review, history and exam, documentation, education and counseling, as well as communication and coordination of care, and further activities as noted above.  This time includes time spent reviewing CGM.      It is my privilege to be involved in the care of the above patient.     Marisabel Prieto PA-C, MPAS  [facility]  Diabetes, Endocrinology, and Metabolism  [phone] Appointments/Nurse  [phone] pager  [phone] nurse line    This note was completed in part using Dragon voice recognition, and may contain word and grammatical errors.        Patient is showing 4/5 MNCM met. A1c not in range   JUHI Sol  Outcome for 11/03/23 10:52 AM :Sent patient Smule message asking them to upload their BG data  Raven Marc

## 2023-11-07 ENCOUNTER — OFFICE VISIT (OUTPATIENT)
Dept: ENDOCRINOLOGY | Facility: CLINIC | Age: 68
End: 2023-11-07
Payer: COMMERCIAL

## 2023-11-07 VITALS
WEIGHT: 172 LBS | OXYGEN SATURATION: 100 % | BODY MASS INDEX: 26.94 KG/M2 | SYSTOLIC BLOOD PRESSURE: 128 MMHG | DIASTOLIC BLOOD PRESSURE: 75 MMHG | HEART RATE: 90 BPM

## 2023-11-07 DIAGNOSIS — E11.3513 TYPE 2 DIABETES MELLITUS WITH PROLIFERATIVE RETINOPATHY OF BOTH EYES AND MACULAR EDEMA, UNSPECIFIED WHETHER LONG TERM INSULIN USE (H): Primary | ICD-10-CM

## 2023-11-07 LAB — HBA1C MFR BLD: 8.1 %

## 2023-11-07 PROCEDURE — 83036 HEMOGLOBIN GLYCOSYLATED A1C: CPT | Performed by: PATHOLOGY

## 2023-11-07 PROCEDURE — 99215 OFFICE O/P EST HI 40 MIN: CPT | Performed by: PHYSICIAN ASSISTANT

## 2023-11-07 RX ORDER — DULAGLUTIDE 3 MG/.5ML
3 INJECTION, SOLUTION SUBCUTANEOUS WEEKLY
Qty: 2 ML | Refills: 3 | Status: SHIPPED | OUTPATIENT
Start: 2023-11-07 | End: 2023-11-08

## 2023-11-07 ASSESSMENT — PAIN SCALES - GENERAL: PAINLEVEL: NO PAIN (0)

## 2023-11-07 NOTE — PATIENT INSTRUCTIONS
Estimado Lucian y Shivani,    Por ahora sigue con     Humulin N 30 en la maniana y 12 en la tarde.  Tambien la pastilla Jardiance 10 mg en la maniana mientras no hay flojera y esta tomando agua.        Cuando los numeros de azucar y el apetito vuelve, puede resumir Trulicity 3 g weekly.      Tambien, peude volver a usar Humalog si necesita si el azucar a menudo es >250, 300.      Lllamen si hay bajones.    My best wishes,    Marisabel Prieto PA-C, MPAS  [facility]  Diabetes, Endocrinology, and Metabolism  [phone] Appointments/Nurse  [phone] Fax

## 2023-11-07 NOTE — LETTER
11/7/2023       RE: Lucian Oliveira  [address] [facility] [address]     Dear Colleague,    Thank you for referring your patient, Lucian Oliveira, to the [facility] [place] at [facility]. Please see a copy of my visit note below.             Diabetes Consult Note  November 7, 2023        Lucian Oliveira  is a 68 year old male who has a past medical history of CAD (coronary artery disease), CHF (congestive heart failure) (H), CKD (chronic kidney disease), stage III (H), Cortical cataract of both eyes, Diabetes (H), Hyperlipidemia, Hypertension, Infection due to Streptococcus mitis group, Ischemic cardiomyopathy, Obesity, CHANTEL (obstructive sleep apnea), CHANTEL (obstructive sleep apnea)- severe (AHI 30), and Osteoarthritis. He is here for follow-up of diabetes.    He is  s/p heart transplant on 7/19/2020 and left eye retinectomy on 12/21/20.     His diabetes is also complicated by proliferative diabetic retinopathy.  He had vitreous hemorrhage in his right eye in April 2021, resolved with last atorvastatin injection 8/4/2022.  Also has undergone PRP in his right eye on 8/24 2022.  His left eye also has proliferative diabetic retinopathy and Avastin injections.     He was hospitalized in 2020 with high blood sugar while on higher dose steroids and discharged again on NPH in the morning with lispro at meals.  He has had difficulty with transitioning between insulin regimens and remained on NPH for many years.  At her last visit which was virtual, we discussed transitioning to Lantus I wanted to see them in person to do this and asked them to fill the prescription and then come to an in person meeting.  We also did discuss starting empagliflozin.    He and his wife have struggled with insulin and medication dosing due to very limited literacy in both English and Georgian.    I had been hoping to bring them into clinic  for an extended appointment to attempt to transition Lucian from the NPH insulin he was started on following transplant to long and short acting insulin.      Interim:  Recently he was hospitalized October 11 through 15 acute kidney injury and anion gap metabolic acidosis secondary to extrarenal losses from norovirus and enteroaggregate of E. coli diarrhea.  His most recent A1c was 8.7.  Regimen prior to hospitalization was noted to be Trulicity 4.5 mg weekly Humulin NPH 40 units every morning and 12 every afternoon with Humalog 12-14 per meal and Jardiance 10 mg.    Upon discharge he was advised to remain on NPH 40 units every morning, 12 units every afternoon to hold his Jardiance and Trulicity until diarrhea resolved.      Today:  He is eating very little - just one tortilla at meals.  He lost a lot of weight.  Some days he eats breakfast and lunch, but often not hungry for dinner.  At times will have some coffee but often not even this.  They have been holding his 12 units in the evening when he is skipping meals or just having coffee.  They do note that his blood sugar might be a little higher in the morning with this but he was having lows overnight when they did give it.  On a couple of occasions this his blood sugar over 250 and they did give a small amount of the Humalog insulin that they still have.  They use the sliding scale that I had previously given them, obese of 8 units for blood sugar 1 , and additional 1 unit per 30 for any blood sugars above 200.  They stated that this did not low lead to any low blood sugar except on one occasion when he did not eat anything at all.      Will usually he and Shivani exercise regularly he is slowly getting back to this as he has not had very much energy since his hospitalization.  He has gone on some nights walks around the park since discharge.  While he has decreased appetite both that his energy are slowly getting better.  He denies any stomach upset any  difficulties with diarrhea.  They understood to restart the Jardiance after loose stools resolved but they wanted to wait until today.  He denies any difficulties with Trulicity including constipation significant stomach upset.  Fortunately Shivani nor anyone else in the household became ill with his stomach bug.      Current Diabetes Medications:  Putting NPH 30 units in morning and 12 in the evening only if has supper.  If not, not giving.  On a couple of occasions when blood sugar has been over 250 in the evening they have used the previous NPH sliding scale.  Jardiance and Trulicity are being held.    Occasional evening Humalog  - 8 units +  1:30>200      We reviewed glucometer/CGMS data together.  It revealed:    FB - 169 -fasting  Evening B - 261.  Before evening meal.      History of Diabetes monitoring and complications/ prevention:  CAD: Yes  Last eye exam results: Seen ophthalmology every 6 weeks   last dental exam: Reports UTD  Kidneys: Chronic kidney disease seeing Dr. Shelton.  As noted recently hospitalized with DAYA.  GFR on 10/25/1936 creatinine 1.96.  HTN: Yes  On Statin: Yes  On Aspirin: Yes  Depression: Denies.  ED: yes    Lucian's PMH, PSH and allergies were reviewed today and pertinent information is summarized above.    Lucian's pertinent social and family history are also reviewed today and pertinent information is summarized above.      Current Outpatient Medications   Medication    acetaminophen (TYLENOL) 325 MG tablet    Alcohol Swabs PADS    aspirin (ASA) 81 MG chewable tablet    blood glucose (NO BRAND SPECIFIED) test strip    blood glucose monitoring (ACCU-CHEK FELIPE SMARTVIEW) meter device kit    blood glucose monitoring (SOFTCLIX) lancets    Calcium Carb-Cholecalciferol (CALCIUM CARBONATE-VITAMIN D3) 600-400 MG-UNIT TABS    Dulaglutide (TRULICITY) 4.5 MG/0.5ML SOPN    empagliflozin (JARDIANCE) 10 MG TABS tablet    ferrous sulfate (FEROSUL) 325 (65 Fe) MG tablet    insulin  "lispro (HUMALOG KWIKPEN) 100 UNIT/ML (1 unit dial) KWIKPEN    insulin NPH (HUMULIN N KWIKPEN) 100 UNIT/ML injection    insulin pen needle (32G X 4 MM) 32G X 4 MM miscellaneous    ketorolac (ACULAR) 0.5 % ophthalmic solution    latanoprost (XALATAN) 0.005 % ophthalmic solution    magnesium oxide 400 MG tablet    mycophenolate (GENERIC EQUIVALENT) 500 MG tablet    omega-3 acid ethyl esters (LOVAZA) 1 g capsule    prednisoLONE acetate (PRED FORTE) 1 % ophthalmic suspension    rosuvastatin (CRESTOR) 20 MG tablet    senna-docusate (SENOKOT-S/PERICOLACE) 8.6-50 MG tablet    sirolimus (GENERIC EQUIVALENT) 0.5 MG tablet    sulfamethoxazole-trimethoprim (BACTRIM) 400-80 MG tablet    tamsulosin (FLOMAX) 0.4 MG capsule     No current facility-administered medications for this visit.         ROS:   Reports good physical activity tolerance.  Denies any pedal lesions or vision changes or concerns. Denies any other acute concerns except as noted above.      Exam:    /75 (BP Location: Left arm, Patient Position: Sitting, Cuff Size: Adult Regular)   Pulse 90   Wt 78 kg (172 lb)   SpO2 100%   BMI 26.94 kg/m      Wt Readings from Last 10 Encounters:   11/07/23 78 kg (172 lb)   10/25/23 74.7 kg (164 lb 9.6 oz)   10/14/23 73 kg (160 lb 14.4 oz)   10/09/23 74.8 kg (165 lb)   07/25/23 80.3 kg (177 lb)   07/17/23 82.5 kg (181 lb 12.8 oz)   07/17/23 81.8 kg (180 lb 4.8 oz)   02/14/23 82.7 kg (182 lb 4.8 oz)   02/06/23 81.5 kg (179 lb 10.8 oz)   02/02/23 83.9 kg (185 lb)       General: Pleasant, well nourished and hydrated male in NAD.   Psych:  Mood is \"good,\" affect is appropriate.  Thought form and content are fluid and coherent.    HEENT: Eyes and sclera are clear. Extraocular movements are grossly intact without proptosis.  Nares are patent, mucous membranes moist.  Neck: No masses or JVD are noted.    Resp: Easy and unlabored breathing.   Neuro: Alert and oriented, communicating clearly.   Ext: no swelling or edema.  Good " "distal pulses and capillary refill in digits.  Skin in good condition.  Sensation is intact to monofilament testing bilaterally and throughout.    Data:      Recent Labs   Lab Test 10/25/23  1005 10/18/23  1206 10/11/23  1926 10/11/23  1128 08/14/23  0917 07/28/23  1226 07/28/23  1205 07/17/23  0908 02/14/23  0821 02/02/23  1121 11/30/21  0859 11/17/21  1126 08/10/20  0845 08/05/20  0843 07/07/20  1732 07/07/20  0720 08/16/18  0834 08/06/18  0000 04/30/18  1148 04/30/18  0000   A1C  --   --   --   --   --   --   --  8.7*  --  8.3*   < >  --    < >  --   --   --    < >  --   --   --    HEMOGLOBINA1  --   --   --   --   --   --   --   --   --   --   --   --   --   --   --   --   --  8.3*  --  10.6*   TSH  --   --   --   --   --   --   --   --   --   --   --   --   --  0.80  --  1.30   < >  --   --   --    LDL  --   --   --   --   --   --   --  26  --  14   < >  --    < >  --   --   --    < >  --   --   --    HDL  --   --   --   --   --   --   --  30*  --  32*   < >  --    < >  --   --   --    < >  --   --   --    TRIG  --   --   --   --   --   --   --  178*  --  365*   < >  --    < >  --   --   --    < >  --   --   --    CR 1.96* 2.19*   < > 5.77*   < >  --    < > 1.95*   < > 2.26*   < >  --    < > 1.70*   < >  --    < >  --    < >  --    MICROL  --   --   --   --   --  21.4  --   --   --   --   --  146  --   --   --   --    < >  --   --   --    AST 17  --   --  22  --   --   --  24  --  23   < >  --    < >  --    < >  --    < >  --   --   --    ALT 13  --   --  20  --   --   --  17  --  19   < >  --    < >  --    < >  --    < >  --   --   --     < > = values in this interval not displayed.           Most recent GFRs:    Lab Results   Component Value Date    GFRESTIMATED 36 (L) 10/25/2023    GFRESTIMATED 32 (L) 10/18/2023    GFRESTIMATED 27 (L) 10/15/2023    GFRESTBLACK 43 (L) 05/11/2021    GFRESTBLACK 43 (L) 03/02/2021    GFRESTBLACK 48 (L) 01/14/2021       No results found for: \"CPEPT\", \"GADAB\", \"ISCAB\"  FIB-4 " Calculation: 1.98 at 10/25/2023 10:05 AM  Calculated from:  SGOT/AST: 17 U/L at 10/25/2023 10:05 AM  SGPT/ALT: 13 U/L at 10/25/2023 10:05 AM  Platelets: 167 10e3/uL at 10/25/2023 10:05 AM  Age: 68 years  AST   Date Value Ref Range Status   10/25/2023 17 0 - 45 U/L Final     Comment:     Reference intervals for this test were updated on 6/12/2023 to more accurately reflect our healthy population. There may be differences in the flagging of prior results with similar values performed with this method. Interpretation of those prior results can be made in the context of the updated reference intervals.   05/11/2021 <3 0 - 45 U/L Final     Lab Results   Component Value Date    ALT 13 10/25/2023    ALT 18 05/11/2021             Assessment/Plan:    Lucian Oliveira is a 68 year old male with a long history of type 2 diabetes which was exacerbated following heart transplant in 2020.  He is here today following recent hospitalization for DAYA and metabolic acidosis following norovirus and E. coli infections.      1.  Type 2 diabetes:  His fasting blood sugars are reasonable, he is experiencing some hyperglycemia later in the day.  We discussed resuming his Jardiance and he will go ahead and do this.  Reminded that he needs to hold this if he should have any loose stools nausea or vomiting or fever.  We discussed resuming Trulicity.  We discussed that he should not do this for another month or so and only if his blood sugars are still frequently above 200.  We discussed the possibility of starting with a lower dose of Trulicity but he reports he has never had any difficulty with this and would like to try resuming at 3 mg though if he has significant abdominal pain they will call us before taking a second dose.    He is referred to diabetes education as he may need possible titration of his insulin doses prior to his next visit.  They can also assess whether or not he and Shivani might transition to long and short acting  insulin, though it would be nice if he continued to take primarily just NPH, possibly with a Humalog correction scale before dinner if that were needed.  He previously has used a NPH sliding scale which they state they still have at home.    2. DAYA: As noted above he was resume his low-dose Jardiance and follow-up with nephrology in 3 weeks.     3. DM Complications-   He will continue to see cardiology ophthalmology and nephrology regularly.  He denies any difficulties with depressed mood at this time, though he does endorse fatigue.    40  minutes spent on the date of the encounter doing chart review, history and exam, documentation, education and counseling, as well as communication and coordination of care, and further activities as noted above.  This time includes time spent reviewing CGM.      It is my privilege to be involved in the care of the above patient.     Marisabel Prieto PA-C, [facility]  [facility]  Diabetes, Endocrinology, and Metabolism  [phone] Appointments/Nurse  [phone] pager  [phone] nurse line    This note was completed in part using Dragon voice recognition, and may contain word and grammatical errors.        Patient is showing 4/5 MNCM met. A1c not in range   Raven Marc, JUHI  Outcome for 11/03/23 10:52 AM :Sent patient [employer] [facility] message asking them to upload their BG data  Raven Marc

## 2023-11-08 ENCOUNTER — LAB (OUTPATIENT)
Dept: LAB | Facility: CLINIC | Age: 68
End: 2023-11-08
Payer: COMMERCIAL

## 2023-11-08 ENCOUNTER — TELEPHONE (OUTPATIENT)
Dept: TRANSPLANT | Facility: CLINIC | Age: 68
End: 2023-11-08

## 2023-11-08 ENCOUNTER — OFFICE VISIT (OUTPATIENT)
Dept: CARDIOLOGY | Facility: CLINIC | Age: 68
End: 2023-11-08
Attending: INTERNAL MEDICINE
Payer: COMMERCIAL

## 2023-11-08 VITALS
WEIGHT: 173 LBS | DIASTOLIC BLOOD PRESSURE: 74 MMHG | OXYGEN SATURATION: 100 % | HEART RATE: 87 BPM | BODY MASS INDEX: 27.1 KG/M2 | SYSTOLIC BLOOD PRESSURE: 139 MMHG

## 2023-11-08 DIAGNOSIS — Z94.1 HEART REPLACED BY TRANSPLANT (H): Primary | ICD-10-CM

## 2023-11-08 DIAGNOSIS — Z94.1 HEART REPLACED BY TRANSPLANT (H): ICD-10-CM

## 2023-11-08 DIAGNOSIS — E11.3513 TYPE 2 DIABETES MELLITUS WITH PROLIFERATIVE RETINOPATHY OF BOTH EYES AND MACULAR EDEMA, UNSPECIFIED WHETHER LONG TERM INSULIN USE (H): ICD-10-CM

## 2023-11-08 DIAGNOSIS — Z94.1 HEART TRANSPLANT, ORTHOTOPIC, STATUS (H): ICD-10-CM

## 2023-11-08 DIAGNOSIS — A09 DIARRHEA OF INFECTIOUS ORIGIN: ICD-10-CM

## 2023-11-08 LAB
ANION GAP SERPL CALCULATED.3IONS-SCNC: 10 MMOL/L (ref 7–15)
BASOPHILS # BLD AUTO: 0 10E3/UL (ref 0–0.2)
BASOPHILS NFR BLD AUTO: 0 %
BUN SERPL-MCNC: 17.7 MG/DL (ref 8–23)
CALCIUM SERPL-MCNC: 8.5 MG/DL (ref 8.8–10.2)
CHLORIDE SERPL-SCNC: 108 MMOL/L (ref 98–107)
CREAT SERPL-MCNC: 1.81 MG/DL (ref 0.67–1.17)
DEPRECATED HCO3 PLAS-SCNC: 21 MMOL/L (ref 22–29)
EGFRCR SERPLBLD CKD-EPI 2021: 40 ML/MIN/1.73M2
EOSINOPHIL # BLD AUTO: 0 10E3/UL (ref 0–0.7)
EOSINOPHIL NFR BLD AUTO: 1 %
ERYTHROCYTE [DISTWIDTH] IN BLOOD BY AUTOMATED COUNT: 15.4 % (ref 10–15)
GLUCOSE SERPL-MCNC: 165 MG/DL (ref 70–99)
HCT VFR BLD AUTO: 21.1 % (ref 40–53)
HGB BLD-MCNC: 6.8 G/DL (ref 13.3–17.7)
HGB BLD-MCNC: 6.9 G/DL (ref 13.3–17.7)
IMM GRANULOCYTES # BLD: 0 10E3/UL
IMM GRANULOCYTES NFR BLD: 1 %
IRON BINDING CAPACITY (ROCHE): 213 UG/DL (ref 240–430)
IRON SATN MFR SERPL: 31 % (ref 15–46)
IRON SERPL-MCNC: 66 UG/DL (ref 61–157)
LDH SERPL L TO P-CCNC: 178 U/L (ref 0–250)
LYMPHOCYTES # BLD AUTO: 1.3 10E3/UL (ref 0.8–5.3)
LYMPHOCYTES NFR BLD AUTO: 32 %
MAGNESIUM SERPL-MCNC: 1.5 MG/DL (ref 1.7–2.3)
MCH RBC QN AUTO: 27.6 PG (ref 26.5–33)
MCHC RBC AUTO-ENTMCNC: 32.7 G/DL (ref 31.5–36.5)
MCV RBC AUTO: 84 FL (ref 78–100)
MONOCYTES # BLD AUTO: 0.3 10E3/UL (ref 0–1.3)
MONOCYTES NFR BLD AUTO: 8 %
NEUTROPHILS # BLD AUTO: 2.4 10E3/UL (ref 1.6–8.3)
NEUTROPHILS NFR BLD AUTO: 58 %
NRBC # BLD AUTO: 0 10E3/UL
NRBC BLD AUTO-RTO: 0 /100
PHOSPHATE SERPL-MCNC: 1.9 MG/DL (ref 2.5–4.5)
PLATELET # BLD AUTO: 150 10E3/UL (ref 150–450)
POTASSIUM SERPL-SCNC: 4.1 MMOL/L (ref 3.4–5.3)
RBC # BLD AUTO: 2.5 10E6/UL (ref 4.4–5.9)
RETICS # AUTO: 0.03 10E6/UL (ref 0.03–0.1)
RETICS/RBC NFR AUTO: 1.2 % (ref 0.5–2)
SODIUM SERPL-SCNC: 139 MMOL/L (ref 135–145)
WBC # BLD AUTO: 4.1 10E3/UL (ref 4–11)

## 2023-11-08 PROCEDURE — 36415 COLL VENOUS BLD VENIPUNCTURE: CPT | Performed by: PATHOLOGY

## 2023-11-08 PROCEDURE — 85045 AUTOMATED RETICULOCYTE COUNT: CPT | Performed by: PATHOLOGY

## 2023-11-08 PROCEDURE — 99000 SPECIMEN HANDLING OFFICE-LAB: CPT | Performed by: PATHOLOGY

## 2023-11-08 PROCEDURE — 83540 ASSAY OF IRON: CPT | Performed by: PATHOLOGY

## 2023-11-08 PROCEDURE — 83550 IRON BINDING TEST: CPT | Performed by: PATHOLOGY

## 2023-11-08 PROCEDURE — 83615 LACTATE (LD) (LDH) ENZYME: CPT | Performed by: PATHOLOGY

## 2023-11-08 PROCEDURE — 84100 ASSAY OF PHOSPHORUS: CPT | Performed by: PATHOLOGY

## 2023-11-08 PROCEDURE — 99215 OFFICE O/P EST HI 40 MIN: CPT | Performed by: NURSE PRACTITIONER

## 2023-11-08 PROCEDURE — G0463 HOSPITAL OUTPT CLINIC VISIT: HCPCS | Performed by: NURSE PRACTITIONER

## 2023-11-08 PROCEDURE — 85025 COMPLETE CBC W/AUTO DIFF WBC: CPT | Performed by: PATHOLOGY

## 2023-11-08 PROCEDURE — 82668 ASSAY OF ERYTHROPOIETIN: CPT | Mod: 90 | Performed by: PATHOLOGY

## 2023-11-08 PROCEDURE — 85018 HEMOGLOBIN: CPT | Performed by: PATHOLOGY

## 2023-11-08 PROCEDURE — 80048 BASIC METABOLIC PNL TOTAL CA: CPT | Performed by: PATHOLOGY

## 2023-11-08 PROCEDURE — 83735 ASSAY OF MAGNESIUM: CPT | Performed by: PATHOLOGY

## 2023-11-08 RX ORDER — DULAGLUTIDE 3 MG/.5ML
3 INJECTION, SOLUTION SUBCUTANEOUS WEEKLY
Qty: 2 ML | Refills: 3 | Status: SHIPPED | OUTPATIENT
Start: 2023-11-08 | End: 2024-01-04

## 2023-11-08 ASSESSMENT — PAIN SCALES - GENERAL: PAINLEVEL: NO PAIN (0)

## 2023-11-08 NOTE — TELEPHONE ENCOUNTER
Pt with Hgb of 6.8-6.9 seen in clinic today.  Pt denies bleeding from any source, and feels otherwise OK.  Above reviewed with BING Guajardo NP and Dr. Sheridan.  Referral placed to Anemia clinic. Pt instructed to call coordinator if he notices any signs of bleeding or if he begins to feel worse.  Plan to recheck labs early next week or sooner if needed by anemia clinic.  Pt called with above instructions using  and states understanding.

## 2023-11-08 NOTE — TELEPHONE ENCOUNTER
DATE:  11/8/2023     TIME OF RECEIPT FROM LAB:  1123    ORDERING PROVIDER:     LAB TEST:  hgb    LAB VALUE:  6.8    RESULTS GIVEN WITH READ-BACK TO (PROVIDER):  PAT LOREDO    TIME LAB VALUE REPORTED TO PROVIDER:   1127

## 2023-11-08 NOTE — PATIENT INSTRUCTIONS
Patient Instructions  1. Restart your Iron 325mg tab- one daily.  2. Please have you labs checked on your way out today.  3. Christelle will call you with a plan and lab results.    Next transplant clinic appointment:  TBD  Next lab draw: TBD  Coordinator will call with all pending results.     Please call transplant coordinator with any questions:    Christelle Pettit RN BSN   Post Heart Transplant Nurse Coordinator  [facility]  Questions: [phone]

## 2023-11-08 NOTE — NURSING NOTE
Chief Complaint   Patient presents with    Follow Up     Return Heart Transplant        Vitals were taken, medications reconciled.    Clinton Carreon, Facilitator   10:04 AM

## 2023-11-08 NOTE — PROGRESS NOTES
[facility]  November 8, 2023    HPI:   Mr. Lucian Oliveira is a 68yr old male with a history of CAD (s/p 3v CABG 2008), ICM s/p OHT 7/19/20, DMII, CKD, and HTN who presents to clinic for follow-up.  He declined a professional , and is accompanied by his wife.    He presented to the ED on 10/11 with hypotension, headache, and a 3-week history of diarrhea.  His blood pressure was 87/55.  Labs revealed a creatinine of 5.77 and CO2 of 9, so he was admitted for further management.  He was found to be +CDiff, which was thought to be chronic colonization as the toxin was negative.  He was also found to be +norovirus.  CMV negative.  CT ab/pelvis was negative for colitis.  He was treated with a sodium bicarb infusion and IV fluids, with improvement in his DAYA and metabolic acidosis.  He was treated with a 10-day course of azithromycin, and his MMF was briefly decreased to 1000mg BID given his acute infection.  His Lasix was held, and he discharged 10/15.     He was then seen for follow-up in clinic 10/24, where he was found to be stable.  His renal function had improved, so bactrim was restarted (toxo ppx).  He presents today for follow-up.    Transplant-related history:  His post-operative course was c/b primary graft dysfunction (LVEF 20-25% and severe RV dysfunction, thought to be secondary to prolonged ischemic time, resolved by f/up TTE 7/27/20), VT, pericardial effusion (conservatively managed with resolution), donor streptococcus salivarius bacteremia (s/p 7d course of ceftriaxone), and RUE DVT c/b HIT (s/p PLEX).  He ultimately discharged 8/3/20.     He has been readmitted as follows:  - 8/24/20-8/28/20:  hyperglycemia (BGs 500s), thought to be secondary to incorrect home insulin administration  - 9/22/20-10/5/20:  fevers and drainage from former ICD site, with Chest/abd CT concerning for cellulitis in the epigastric region.  He underwent surgical debridement of his left infraclavicular  "wound and debridement of midline chest tube wound on 9/23, which was c/b bleeding and required reoperation 9/28 for I&D of hematoma.  Cultures were positive for Pseudomonas aeruginosa, so he was treated with 4 weeks of cipro per Transplant ID and CVTS.  He developed leukopenia and moderate neutropenia, so his cellcept was held then restarted at low dose, and valcyte was stopped.  He received neupogen x 1.  He ultimately discharged with a wound vac.  - 12/9/20:  ED visit for right vision loss, and found to have a vitreous hemorrhage in his right eye and total retinal detachment of his left eye per ophthalmology.  He was then seen in the eye clinic 12/10, where he was advised an Avastin injection in the right eye (for proliferative diabetic retinopathy and vitreous hemorrhage).  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  Last RHC:  7/2023 showed elevated biventricular filling pressures, with RA 14, mPA 30, PCW 22, and CI 3.4.  Echo/cMRI: cMRI 9/2022 showed normal biventricular function (LVEF 74%, RVEF 59%) and was negative for ischemia but revealed a new foci of mesocardial/epicardial LGE in the midventricular inferoseptum and anterior segments in a nonischemic pattern.  TTE 7/2023 showed stable graft function, with LVEF 55-60% and normal RV size/function.    Since his last visit, he states that he feels much better.  He has been active, with no exertional concerns.  He notes improvement in his energy levels.  His breathing has been well, and he denies SOB, " PND, and orthopnea.  His appetite has been well, and he is eating more regularly and denies nausea, vomiting, and constipation.  His diarrhea has improved.  His weight had been down to 168#, and he is now 173# per his home scale.  He states that a good weight for him is 178#.  He notes some LE edema.  He has a little headache, which has been present for the last 3 days, and for which he takes tylenol with relief.  He otherwise denies chest pain, palpitations, dizziness, falls, acute vision changes, fevers, chills, cough, sore throat, and signs of bleeding.      Serostatus:  CMV D+/R+  EBV D+/R+  Toxo D+/R-    Patient Active Problem List    Diagnosis Date Noted    Acute renal failure superimposed on chronic kidney disease, unspecified acute renal failure type, unspecified CKD stage  10/11/2023     Priority: Medium    Lipid screening 07/17/2023     Priority: Medium    Prostate cancer screening 07/17/2023     Priority: Medium    Type 2 diabetes mellitus with diabetic nephropathy, with long-term current use of insulin (H) 07/17/2023     Priority: Medium    Nuclear senile cataract of right eye 01/25/2023     Priority: Medium     Added automatically from request for surgery [id number]      Acute embolism and thrombosis of right axillary vein (H) 03/02/2022     Priority: Medium    Atrial flutter, unspecified type (H) 12/21/2021     Priority: Medium    Immunodeficiency, unspecified (H24) 02/02/2021     Priority: Medium    CKD (chronic kidney disease) stage 4, GFR 15-29 ml/min (H) 02/02/2021     Priority: Medium    HIT (heparin-induced thrombocytopenia) (H24) 02/02/2021     Priority: Medium    Traction detachment of left retina 12/14/2020     Priority: Medium     Added automatically from request for surgery [id number]      Need for prophylactic antibiotic 10/05/2020     Priority: Medium    Sepsis (H) 09/22/2020     Priority: Medium    Heart transplant, orthotopic, status (H) 07/20/2020     Priority: Medium    Dental caries  07/03/2020     Priority: Medium     Added automatically from request for surgery [id number]      Heart replaced by transplant (H) 07/03/2020     Priority: Medium     Added automatically from request for surgery [id number]      Status post coronary angiogram 07/02/2020     Priority: Medium    Coronary artery disease involving native coronary artery of native heart without angina pectoris 06/18/2020     Priority: Medium     Added automatically from request for surgery [id number]      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, unspecified whether long term insulin use (H) 11/27/2019     Priority: Medium     Added automatically from request for surgery [id number]      Nuclear cataract, nonsenile 11/27/2019     Priority: Medium     Added automatically from request for surgery [id number]      CHATNEL (obstructive sleep apnea)- severe (AHI 30) 10/06/2016     Priority: Medium     Study Date: 10/03/2016- (196.0 lbs) apnea/hypopnea index 30.8. REM AHI 40,  supine AHI n/a. RDI  33.1 . Lowest oxygen saturation 82.8%.  Time spent less than or equal to 88% 4.9 minutes.  Time spent less than or equal to 89% 7.3 minutes. CPAP final  pressure 7 cmH2O with AHI of 0.8.  Time in REM supine on final pressure 0 minutes. No consolidated sleep seen in supine position. During diagnostic portion of study, PLM index 18.2. During treatment portion of study,  PLM index 19.3.            Moderate nonproliferative diabetic retinopathy, without macular edema, associated with type 2 diabetes mellitus 08/16/2016     Priority: Medium    Cortical cataract of both eyes 08/15/2016     Priority: Medium    Secondary renal hyperparathyroidism (H24) 07/26/2016     Priority: Medium    Proteinuria 03/18/2016     Priority: Medium    Vitamin D deficiency 03/18/2016     Priority: Medium    OA (osteoarthritis) of knee 03/18/2016     Priority: Medium    Chondromalacia of patella, unspecified laterality 03/18/2016     Priority: Medium     Pain in joint of left shoulder 03/18/2016     Priority: Medium    Tinnitus 03/18/2016     Priority: Medium     left       Ptosis of right eyelid 03/18/2016     Priority: Medium    Chronic systolic heart failure (H)      Priority: Medium     Echo 7/2015 LVEF 18%      Automatic implantable cardioverter-defibrillator - [employer] single lead ICD, Not Dependent 08/18/2015     Priority: Medium    Health Care Home 01/19/2015     Priority: Medium     *See Letters for [facility] Care Plan: Emergency Care Plan        CKD (chronic kidney disease) stage 3, GFR 30-59 ml/min (H) 01/28/2013     Priority: Medium    CHF (NYHA class II, ACC/AHA stage C) (H) 08/15/2012     Priority: Medium    Hypertension goal BP (blood pressure) < 140/90 01/21/2011     Priority: Medium    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) 10/31/2010     Priority: Medium     Goal <8%      Hyperlipidemia LDL goal <100 10/31/2010     Priority: Medium    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) 03/15/2010     Priority: Medium     MI and open heart with 1 stent placed in 2008; another minor MI in 2009.  MI on 2/19/15. Coronary angiogram from [facility] on 2/19/15 showed severe 3 vessel native CAD (all three vessels [LAD, LCx and RCA] reported to be 100% ocludded at their ostia). All his grafts were considered to be occluded except for LIMA-to-LAD, which was patent and supplied left-to-left, as well as, left-to-right collaterals. There were no targets suitable for revascularization and patient was put on medical therapy.          PAST MEDICAL HISTORY:  Past Medical History:   Diagnosis Date    CAD (coronary artery disease)     CHF (congestive heart failure) (H)     CKD (chronic kidney disease), stage III (H)     Cortical cataract of both eyes     Diabetes (H)     Hyperlipidemia     Hypertension     Infection due to Streptococcus mitis group 09/23/2020     Ischemic cardiomyopathy     Obesity     CHANTEL (obstructive sleep apnea)     occas cpap    CHANTEL (obstructive sleep apnea)- severe (AHI 30)     Osteoarthritis        CURRENT MEDICATIONS:  Prescription Medications as of 2023         Rx Number Disp Refills Start End Last Dispensed Date Next Fill Date Owning Pharmacy    acetaminophen (TYLENOL) 325 MG tablet  60 tablet 3 2020    [facility] [place], [place] - [address] [facility]    Sig: Take 3 tablets (975 mg) by mouth every 8 hours as needed for mild pain    Class: E-Prescribe    Route: Oral    Alcohol Swabs PADS  100 each 0 8/3/2020    [address]    Sig: Use to swab the area of the injection or sergio as directed   Per insurance coverage    Class: Local Print    aspirin (ASA) 81 MG chewable tablet  120 tablet 0 10/6/2020    [address]    Sig: Take 1 tablet (81 mg) by mouth daily    Class: E-Prescribe    Route: Oral    blood glucose (NO BRAND SPECIFIED) test strip  400 strip 3 2023    [facility] #[id number] [address] AT [address]    Sig: Use to test blood sugar 4 times daily or as directed.    Class: E-Prescribe    blood glucose monitoring (ACCU-CHEK FELIPE SMARTVIEW) meter device kit  1 kit 0 2017    [address]    Sig: Use to test blood sugar 3-4 times daily, as directed.    Class: E-Prescribe    blood glucose monitoring (SOFTCLIX) lancets  400 each 8 3/29/2022    [facility] #[id number] [facility] [address] AT [address]    Si each 4 times daily Use to test blood sugars 2 times daily.    Class: E-Prescribe    Route: Does not apply    Calcium Carb-Cholecalciferol (CALCIUM CARBONATE-VITAMIN D3) 600-400 MG-UNIT TABS  180 tablet 3 2022    [facility] [place], [place] -  [address] [place] [address] [facility]    Sig: TAKE ONE TABLET BY MOUTH TWICE A DAY WITH MEALS    Class: E-Prescribe    Dulaglutide (TRULICITY) 3 MG/0.5ML SOPN  2 mL 3 11/7/2023    [facility] - [place], [place] - [address]    Sig: Inject 3 mg Subcutaneous once a week    Class: E-Prescribe    Route: Subcutaneous    ferrous sulfate (FEROSUL) 325 (65 Fe) MG tablet  90 tablet 3 7/20/2023    [facility] - [place], [place] - [address] [place] [address] [facility]    Sig: Take 1 tablet (325 mg) by mouth daily (with breakfast)    Class: E-Prescribe    Route: Oral    insulin lispro (HUMALOG KWIKPEN) 100 UNIT/ML (1 unit dial) KWIKPEN            Sig: Inject 8-14 Units Subcutaneous 3 times daily (before meals)    Class: Historical    Route: Subcutaneous    insulin NPH (HUMULIN N KWIKPEN) 100 UNIT/ML injection  45 mL 1 10/9/2023    [facility] - [place], [place] - [address]    Sig: GIve 40 units each morning and 12 at night subcutaneous    Class: E-Prescribe    insulin pen needle (32G X 4 MM) 32G X 4 MM miscellaneous  450 each 3 1/17/2023    [facility] [address] [place] [address] AT [address]    Sig: Use 5-6 pen needles daily or as directed.    Class: E-Prescribe    ketorolac (ACULAR) 0.5 % ophthalmic solution  10 mL 3 3/9/2023    [facility] [address] [place] [address] AT [address]    Sig: Place 1 drop into the right eye 2 times daily    Class: E-Prescribe    Route: Right Eye    latanoprost (XALATAN) 0.005 % ophthalmic solution            Sig: Place 1 drop into both eyes at bedtime    Class: Historical    Route: Both Eyes    magnesium oxide 400 MG tablet  180 tablet 1 6/20/2023    [facility] - [place], [place] - [address]    Sig: Take 1 tablet (400 mg) by mouth 2 times daily    Class: E-Prescribe    Route: Oral    mycophenolate (GENERIC EQUIVALENT) 500 MG tablet  180  tablet 11 6/15/2023    [facility] - [place], [place] - [address]    Sig: Take 3 tablets (1,500 mg) by mouth 2 times daily    Class: E-Prescribe    Notes to Pharmacy: TXP DT 7/19/2020 (Heart) TXP Dischg DT 8/3/2020 DX Heart transplant Z94.1 TX [facility] ([place], [place])    Route: Oral    omega-3 acid ethyl esters (LOVAZA) 1 g capsule  90 capsule 3 7/14/2023    [facility] - [place], [place] - [address] [facility]    Sig: Take 1 capsule (1 g) by mouth daily    Class: E-Prescribe    Route: Oral    prednisoLONE acetate (PRED FORTE) 1 % ophthalmic suspension  10 mL 3 3/9/2023    [facility] #[id number] [place], [place] - [address] AT [facility]    Sig: Place 1 drop into the right eye 2 times daily    Class: E-Prescribe    Route: Right Eye    rosuvastatin (CRESTOR) 20 MG tablet  90 tablet 3 2/24/2023    [id number] - [place], [place] - [address] AT [facility]    Sig: Take 1 tablet (20 mg) by mouth daily    Class: E-Prescribe    Route: Oral    senna-docusate (SENOKOT-S/PERICOLACE) 8.6-50 MG tablet  60 tablet 3 12/1/2021    [facility] - [place], [place] - [address]    Sig: Take 1 tablet by mouth 2 times daily as needed (hold for loose stools)    Class: E-Prescribe    Route: Oral    sirolimus (GENERIC EQUIVALENT) 0.5 MG tablet  180 tablet 11 7/17/2023    [facility] - [place], [place] - [address]    Sig: Take 6 tablets (3 mg) by mouth daily    Class: E-Prescribe    Notes to Pharmacy: TXP DT 7/19/2020 (Heart) TXP Dischg DT 8/3/2020 DX Heart transplant Z94.1 TX [facility] ([place], [place]) Dose adjustment    Route: Oral    No prior authorization was found for this prescription.    Found prior authorization for another prescription for the same medication: Canceled - Other (The  medication order is discontinued.)    sulfamethoxazole-trimethoprim (BACTRIM) 400-80 MG tablet  36 tablet 3 10/26/2023    [facility] - [place], [place] - [address]    Sig: Take 1 tablet by mouth three times a week    Class: E-Prescribe    Route: Oral    tamsulosin (FLOMAX) 0.4 MG capsule  90 capsule 3 9/2/2022    [facility] - [place], [place] - [address]    Sig: TAKE ONE CAPSULE BY MOUTH ONCE DAILY    Class: E-Prescribe    empagliflozin (JARDIANCE) 10 MG TABS tablet            Sig: Take 10 mg by mouth daily    Class: Historical    Route: Oral            ROS:   A comprehensive ROS was completed and found to be negative except for that noted in the HPI.    Exam:  /74 (BP Location: Right arm, Patient Position: Chair, Cuff Size: Adult Regular)   Pulse 87   Wt 78.5 kg (173 lb)   SpO2 100%   BMI 27.10 kg/m    In general, the patient is a pleasant male in no apparent distress.    HEENT: NC/AT. PERRLA. EOMI.  Sclerae white, not injected.    Neck:  No adenopathy, No thyromegaly.    COR: JVD at clavicle when sitting upright.  RRR.  Normal S1 S2 splits physiologically.  No murmur, rub click, or gallop.    Lungs:  CTA. No rhonchi.    Abdomen: soft, nontender, nondistended.  No organomegaly.  Extremities:  No clubbing or cyanosis, trace bilateral LE edema.    Neuro: Alert & Oriented x 3, grossly non focal.  Integument: no open lesions, rashes, or jaundice.    Labs:  CBC RESULTS:   Lab Results   Component Value Date    WBC 3.7 (L) 10/25/2023    WBC 5.1 05/11/2021    RBC 2.94 (L) 10/25/2023    RBC 4.47 05/11/2021    HGB 8.0 (L) 10/25/2023    HGB 13.4 05/11/2021    HCT 24.1 (L) 10/25/2023    HCT 41.5 05/11/2021    MCV 82 10/25/2023    MCV 93 05/11/2021    MCH 27.2 10/25/2023    MCH 30.0 05/11/2021    MCHC 33.2 10/25/2023    MCHC 32.3 05/11/2021    RDW 14.2 10/25/2023    RDW 13.2 05/11/2021     10/25/2023     05/11/2021       BMP RESULTS:  Lab  "Results   Component Value Date     11/08/2023     05/11/2021    POTASSIUM 4.1 11/08/2023    POTASSIUM 4.4 03/14/2022    POTASSIUM 4.0 05/11/2021    CHLORIDE 108 (H) 11/08/2023    CHLORIDE 109 03/14/2022    CHLORIDE 107 05/11/2021    CO2 21 (L) 11/08/2023    CO2 26 03/14/2022    CO2 27 05/11/2021    ANIONGAP 10 11/08/2023    ANIONGAP 6 03/14/2022    ANIONGAP 6 05/11/2021     (H) 11/08/2023     (H) 10/15/2023     (H) 03/14/2022    GLC 98 05/11/2021    BUN 17.7 11/08/2023    BUN 29 03/14/2022    BUN 34 (H) 05/11/2021    CR 1.81 (H) 11/08/2023    CR 1.84 (H) 05/11/2021    GFRESTIMATED 40 (L) 11/08/2023    GFRESTIMATED 37 (L) 05/11/2021    GFRESTBLACK 43 (L) 05/11/2021    BRYANNA 8.5 (L) 11/08/2023    BRYANNA 9.0 05/11/2021      LIPID RESULTS:  Lab Results   Component Value Date    CHOL 92 07/17/2023    CHOL 133 01/05/2021    HDL 30 (L) 07/17/2023    HDL 40 01/05/2021    LDL 26 07/17/2023    LDL 58 01/05/2021    TRIG 178 (H) 07/17/2023    TRIG 179 (H) 01/05/2021    CHOLHDLRATIO 2.6 06/27/2015    NHDL 62 07/17/2023    NHDL 94 01/05/2021       IMMUNOSUPPRESSANT LEVELS  Lab Results   Component Value Date    TACROL 4.5 (L) 02/14/2023    TACROL 5.6 05/11/2021    DOSTAC 2/13/2023 02/14/2023    DOSTAC  7pm 5/10/21 05/11/2021    RAPAMY 6.2 10/18/2023       No components found for: \"CK\"  Lab Results   Component Value Date    MAG 1.5 (L) 11/08/2023    MAG 1.8 05/11/2021     Lab Results   Component Value Date    A1C 8.7 (H) 07/17/2023    A1C 6.7 (H) 01/14/2021     Lab Results   Component Value Date    PHOS 1.9 (L) 11/08/2023    PHOS 3.2 05/11/2021     Lab Results   Component Value Date    NTBNPI 8,792 (H) 09/22/2020     Lab Results   Component Value Date    SAITESTMET SA EDTA FCS 07/17/2023    SAITESTMET SA FCS 01/05/2021    SAICELL Class I 07/17/2023    SAICELL Class I 01/05/2021    [id number] None 07/17/2023    [id number] None 01/05/2021    [id number] None 07/17/2023    [id number] None 01/05/2021    " SAIREPCOM  07/17/2023      HLA PRA Test performed by modified testing procedure that may also include pretreatment of serum. Pretreatment may be the addition of fetal calf serum, EDTA, and/or adsorption.  High-risk, MFI > 3,000.  Mod-risk, -3,000.    SAIREPCOM  01/05/2021      Test performed by modified procedure. Serum heat inactivated and tested   by a modified ([employer]) protocol including fetal calf serum addition.   High-risk, mfi >3,000. Mod-risk, mfi 500-3,000.       Lab Results   Component Value Date    SAIITESTME SA EDTA FCS 07/17/2023    SAIITESTME SA FCS 01/05/2021    SAIICELL Class II 07/17/2023    SAIICELL Class II 01/05/2021    [id number] None 07/17/2023    [id number] None 01/05/2021    [id number] None 07/17/2023    [id number] None 01/05/2021    SAIIREPCOM  07/17/2023      HLA PRA Test performed by modified testing procedure that may also include pretreatment of serum. Pretreatment may be the addition of fetal calf serum, EDTA, and/or adsorption.  High-risk, MFI > 3,000.  Mod-risk, -3,000.    SAIIREPCOM  01/05/2021      Test performed by modified procedure. Serum heat inactivated and tested   by a modified ([employer]walk) protocol including fetal calf serum addition.   High-risk, mfi >3,000. Mod-risk, mfi 500-3,000.       Lab Results   Component Value Date    CSPEC EDTA PLASMA 05/11/2021       Assessment and Plan:  Mr. Lucian Oliveira is a 68yr old male with a history of CAD (s/p 3v CABG 2008), ICM s/p OHT 7/19/20, DMII, CKD, and HTN who presents to clinic for follow-up.  He declined a professional , and is accompanied by his wife.    Labs today show stable electrolytes and renal function.  CBC shows hgb 6.9.    Mr. Oliveira appears stable.  His BPs remain stable.  His weight is up slightly, but he appears euvolemic.  Of note, he remains off of lisinopril and diuretics since his hospital discharge.    His Hgb is down today.  He denies any signs of bleeding, and reports that his stools are  "normal.  Will start by repeating a hgb, as well as B12, folate, iron levels, erythropoietin, retic count, and LDH.  If all return normal, will refer to the Anemia Clinic for further evaluation/management -- d/w Dr. Sheridan.    We will plan for him to follow-up in the next 1-2 months, or sooner should new concerns arise.      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  He ultimately discharged 8/3/20.     He has been readmitted as follows:  - 8/24/20-8/28/20:  hyperglycemia (BGs 500s), thought to be secondary to incorrect home insulin administration  - 9/22/20-10/5/20:  fevers and drainage from former ICD site, with Chest/abd CT concerning for cellulitis in the epigastric region.  He underwent surgical debridement of his left infraclavicular wound and debridement of midline chest tube wound on 9/23, which was c/b bleeding and required reoperation 9/28 for I&D of hematoma.  Cultures were positive for Pseudomonas aeruginosa, so he was treated with 4 weeks of cipro per Transplant ID and CVTS.  He developed leukopenia and moderate neutropenia, so his cellcept was held then restarted at low dose, and valcyte was stopped.  He received neupogen x 1.  He ultimately discharged with a wound vac.  - 12/9/20:  ED visit for right vision loss, and found to have a vitreous hemorrhage in his right eye and total retinal detachment of his left eye per ophthalmology.  He was then seen in the eye clinic 12/10, where he was advised an Avastin injection in the right eye (for proliferative diabetic retinopathy and vitreous hemorrhage).  He then underwent \"27 gauge pars plana vitectomy, membrane peel, " "perfluoroctane liquid (PFO), retinectomy, endolaser\" 12/21 for the tractional retinal detachment of his left eye.  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  Last RHC:  7/2023 showed elevated biventricular filling pressures, with RA 14, mPA 30, PCW 22, and CI 3.4.  Echo/cMRI: cMRI 9/2022 showed normal biventricular function (LVEF 74%, RVEF 59%) and was negative for ischemia but revealed a new foci of mesocardial/epicardial LGE in the midventricular inferoseptum and anterior segments in a nonischemic pattern.  TTE 7/2023 showed stable graft function, with LVEF 55-60% and normal RV size/function.     Serostatus:  - CMV D+/R+  - EBV D+/R+  - Toxo D+/R-     Immunosuppression:  - Sirolimus, goal level 6-8.  Level 10/18 was 6.2.  - MMF 1500mg twice daily     PPx:  - CAV:  Aspirin 81mg daily and rosuvastatin 20mg daily  - GI:  n/a  - Osteoporosis:  Calcium/vitamin D supplements  - Toxo:  single strength bactrim three times per week (qMWF), lifelong ppx     Graft function:  - BPs: Controlled, lisinopril was held at recent hospital discharge.  Asked that he call if BPs persist > 130/90.  - fluid status:  Euvolemic, Lasix was held at recent hospital discharge.  Asked that he call with any signs of fluid retention/ongoing weight gain.     Health maintenance:  - eye exam UTD  - pneumonia shots unclear --> discuss with PCP  - completed shingrix        Acute anemia  Hgb down today, repeating and adding additional studies as noted above.  Will refer to anemia clinic pending results.    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   " "  Hypertriglyceridemia  Triglycerides have been elevated, up to 300s since transplant.  Last triglyceride level was 178 (7/2023).  Continue rosuvastatin as above.     DMII  Follows with endo and PCP.  On jardiance, trulicity, insulin.     HIT  HIT antibody + 7/2020.  CORRINE +.  No heparin products.     RIJ and axillary vein DVT, nonocclusive  Right cephalic vein occlusive thrombus  S/p course of anticoagulation.     Total retinal detachment of the left eye, s/p retinectomy 12/21/20  Proliferative diabetic retinopathy  Right vitreous hemorrhage  He presented to the ED 12/9/20 with right vision loss.  Ophthalmology was consulted, and found that he had a vitreous hemorrhage in his right eye and total retinal detachment of his left eye.  He was then seen in the eye clinic 12/10/20, where he was advised an Avastin injection in the right eye (for proliferative diabetic retinopathy and vitreous hemorrhage).  He then underwent \"27 gauge pars plana vitectomy, membrane peel, perfluoroctane liquid (PFO), retinectomy, endolaser\" 12/21/20 for the tractional retinal detachment of his left eye.  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  He continues to follow with ophthalmology, and notes improvement in his right-eye vision.          The above was reviewed with  Tee, who verbalized understanding and will call with further questions/concerns.     50 minutes spent with patient, along with preparing for visit, reviewing follow up, and completing documentation.        Arlin Guajardo, SONA, P-BC  Advanced Heart Failure Nurse Practitioner  [facility]  Patient Care Team:  No Ref-Primary, Physician as PCP - Diane Groves APRN CNP as Nurse Practitioner (Cardiology)  Arvin Sheridan MD as MD (Cardiology)  Yue Dowd MD as MD (INTERNAL MEDICINE - ENDOCRINOLOGY, DIABETES & METABOLISM)  Hodan, " Christelle Doty, RN as Transplant Coordinator (Cardiology)  Negrito Rai [facility] as Pharmacist (Pharmacist)  Care, [facility] ([facility] ([facility] ([place]))  Triny Bunch MD as Assigned Surgical Provider  Marisabel Prieto PA-C as Assigned Endocrinology Provider  Arlin Guajardo NP as Referring Physician (Interventional Cardiology)  Triny Bunch MD as MD (Ophthalmology)  Fracisco Casiano MD as Assigned PCP  Arvin Sheridan MD as Referring Physician (Cardiovascular Disease)  Cristian Delacruz MD as MD (Dermatology)  Cristian Delacruz MD as MD (Dermatology)  Mónica Shelton MD as MD (Nephrology)  Mónica Shelton MD as Assigned Nephrology Provider  Arvin Sheridan MD as Assigned Heart and Vascular Provider  Xiao Gimenez, RN as Specialty Care Coordinator (Nephrology)  ARVIN SHERIDAN

## 2023-11-08 NOTE — TELEPHONE ENCOUNTER
DATE:  11/8/2023     TIME OF RECEIPT FROM LAB:  1014    ORDERING PROVIDER:     LAB TEST:  Hgb    LAB VALUE:  6.9    RESULTS GIVEN WITH READ-BACK TO (PROVIDER):  PAT LOREDO    TIME LAB VALUE REPORTED TO PROVIDER:   1014

## 2023-11-08 NOTE — NURSING NOTE
Transplant Coordinator Note    Reason for visit: post hospital check in  Coordinator: Present   Caregiver:  wife     Health concerns addressed today:  1. Feeling better, more strength, diarrhea better.  2. Hgb 6.9- rechecking today.  3.       Immunosuppressants:  MMF 1500 BID  Rapa 3mg daily          Labs:       Additional Notes:         Meds, labs reviewed with patient  Medication record reviewed and reconciled  Questions and concerns addressed  Pt verbalized an understanding of plan of care.     Patient Instructions  1. Restart your Iron 325mg tab- one daily.  2. Please have you labs checked on your way out today.  3. Christelle will call you with a plan and lab results.    Next transplant clinic appointment:  TBD  Next lab draw: TBD  Coordinator will call with all pending results.     Please call transplant coordinator with any questions:    Christelle Pettit RN BSN   Post Heart Transplant Nurse Coordinator  [facility]  Questions: [phone]

## 2023-11-08 NOTE — LETTER
11/8/2023      RE: Lucian Oliveira  [address] [facility] [address]       Dear Colleague,    Thank you for the opportunity to participate in the care of your patient, Lucian Oliveira, at the [facility] [place] [facility]. Please see a copy of my visit note below.    [facility]  November 8, 2023    HPI:   Mr. Luican Oliveira is a 68yr old male with a history of CAD (s/p 3v CABG 2008), ICM s/p OHT 7/19/20, DMII, CKD, and HTN who presents to clinic for follow-up.  He declined a professional , and is accompanied by his wife.    He presented to the ED on 10/11 with hypotension, headache, and a 3-week history of diarrhea.  His blood pressure was 87/55.  Labs revealed a creatinine of 5.77 and CO2 of 9, so he was admitted for further management.  He was found to be +CDiff, which was thought to be chronic colonization as the toxin was negative.  He was also found to be +norovirus.  CMV negative.  CT ab/pelvis was negative for colitis.  He was treated with a sodium bicarb infusion and IV fluids, with improvement in his DAYA and metabolic acidosis.  He was treated with a 10-day course of azithromycin, and his MMF was briefly decreased to 1000mg BID given his acute infection.  His Lasix was held, and he discharged 10/15.     He was then seen for follow-up in clinic 10/24, where he was found to be stable.  His renal function had improved, so bactrim was restarted (toxo ppx).  He presents today for follow-up.    Transplant-related history:  His post-operative course was c/b primary graft dysfunction (LVEF 20-25% and severe RV dysfunction, thought to be secondary to prolonged ischemic time, resolved by f/up TTE 7/27/20), VT, pericardial effusion (conservatively managed with resolution), donor streptococcus salivarius bacteremia (s/p 7d course of ceftriaxone), and RUE DVT c/b HIT (s/p PLEX).  He ultimately  "discharged 8/3/20.     He has been readmitted as follows:  - 8/24/20-8/28/20:  hyperglycemia (BGs 500s), thought to be secondary to incorrect home insulin administration  - 9/22/20-10/5/20:  fevers and drainage from former ICD site, with Chest/abd CT concerning for cellulitis in the epigastric region.  He underwent surgical debridement of his left infraclavicular wound and debridement of midline chest tube wound on 9/23, which was c/b bleeding and required reoperation 9/28 for I&D of hematoma.  Cultures were positive for Pseudomonas aeruginosa, so he was treated with 4 weeks of cipro per Transplant ID and CVTS.  He developed leukopenia and moderate neutropenia, so his cellcept was held then restarted at low dose, and valcyte was stopped.  He received neupogen x 1.  He ultimately discharged with a wound vac.  - 12/9/20:  ED visit for right vision loss, and found to have a vitreous hemorrhage in his right eye and total retinal detachment of his left eye per ophthalmology.  He was then seen in the eye clinic 12/10, where he was advised an Avastin injection in the right eye (for proliferative diabetic retinopathy and vitreous hemorrhage).  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  Last RHC:  7/2023 showed elevated biventricular filling pressures, with RA 14, mPA 30, PCW 22, and CI 3.4.  Echo/cMRI: cMRI 9/2022 showed normal biventricular function (LVEF 74%, RVEF 59%) and was negative for ischemia but revealed a new foci of mesocardial/epicardial LGE in the " midventricular inferoseptum and anterior segments in a nonischemic pattern.  TTE 7/2023 showed stable graft function, with LVEF 55-60% and normal RV size/function.    Since his last visit, he states that he feels much better.  He has been active, with no exertional concerns.  He notes improvement in his energy levels.  His breathing has been well, and he denies SOB, PND, and orthopnea.  His appetite has been well, and he is eating more regularly and denies nausea, vomiting, and constipation.  His diarrhea has improved.  His weight had been down to 168#, and he is now 173# per his home scale.  He states that a good weight for him is 178#.  He notes some LE edema.  He has a little headache, which has been present for the last 3 days, and for which he takes tylenol with relief.  He otherwise denies chest pain, palpitations, dizziness, falls, acute vision changes, fevers, chills, cough, sore throat, and signs of bleeding.      Serostatus:  CMV D+/R+  EBV D+/R+  Toxo D+/R-    Patient Active Problem List    Diagnosis Date Noted     Acute renal failure superimposed on chronic kidney disease, unspecified acute renal failure type, unspecified CKD stage  10/11/2023     Priority: Medium     Lipid screening 07/17/2023     Priority: Medium     Prostate cancer screening 07/17/2023     Priority: Medium     Type 2 diabetes mellitus with diabetic nephropathy, with long-term current use of insulin (H) 07/17/2023     Priority: Medium     Nuclear senile cataract of right eye 01/25/2023     Priority: Medium     Added automatically from request for surgery [id number]       Acute embolism and thrombosis of right axillary vein (H) 03/02/2022     Priority: Medium     Atrial flutter, unspecified type (H) 12/21/2021     Priority: Medium     Immunodeficiency, unspecified (H24) 02/02/2021     Priority: Medium     CKD (chronic kidney disease) stage 4, GFR 15-29 ml/min (H) 02/02/2021     Priority: Medium     HIT (heparin-induced thrombocytopenia)  (H24) 02/02/2021     Priority: Medium     Traction detachment of left retina 12/14/2020     Priority: Medium     Added automatically from request for surgery [id number]       Need for prophylactic antibiotic 10/05/2020     Priority: Medium     Sepsis (H) 09/22/2020     Priority: Medium     Heart transplant, orthotopic, status (H) 07/20/2020     Priority: Medium     Dental caries 07/03/2020     Priority: Medium     Added automatically from request for surgery [id number]       Heart replaced by transplant (H) 07/03/2020     Priority: Medium     Added automatically from request for surgery [id number]       Status post coronary angiogram 07/02/2020     Priority: Medium     Coronary artery disease involving native coronary artery of native heart without angina pectoris 06/18/2020     Priority: Medium     Added automatically from request for surgery [id number]       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, unspecified whether long term insulin use (H) 11/27/2019     Priority: Medium     Added automatically from request for surgery [id number]       Nuclear cataract, nonsenile 11/27/2019     Priority: Medium     Added automatically from request for surgery [id number]       CHANTEL (obstructive sleep apnea)- severe (AHI 30) 10/06/2016     Priority: Medium     Study Date: 10/03/2016- (196.0 lbs) apnea/hypopnea index 30.8. REM AHI 40,  supine AHI n/a. RDI  33.1 . Lowest oxygen saturation 82.8%.  Time spent less than or equal to 88% 4.9 minutes.  Time spent less than or equal to 89% 7.3 minutes. CPAP final  pressure 7 cmH2O with AHI of 0.8.  Time in REM supine on final pressure 0 minutes. No consolidated sleep seen in supine position. During diagnostic portion of study, PLM index 18.2. During treatment portion of study,  PLM index 19.3.             Moderate nonproliferative diabetic retinopathy, without macular edema, associated with type 2 diabetes mellitus 08/16/2016     Priority: Medium     Cortical  cataract of both eyes 08/15/2016     Priority: Medium     Secondary renal hyperparathyroidism (H24) 07/26/2016     Priority: Medium     Proteinuria 03/18/2016     Priority: Medium     Vitamin D deficiency 03/18/2016     Priority: Medium     OA (osteoarthritis) of knee 03/18/2016     Priority: Medium     Chondromalacia of patella, unspecified laterality 03/18/2016     Priority: Medium     Pain in joint of left shoulder 03/18/2016     Priority: Medium     Tinnitus 03/18/2016     Priority: Medium     left        Ptosis of right eyelid 03/18/2016     Priority: Medium     Chronic systolic heart failure (H)      Priority: Medium     Echo 7/2015 LVEF 18%       Automatic implantable cardioverter-defibrillator - [employer] single lead ICD, Not Dependent 08/18/2015     Priority: Medium     Health Care Home 01/19/2015     Priority: Medium     *See Letters for [facility] Care Plan: Emergency Care Plan         CKD (chronic kidney disease) stage 3, GFR 30-59 ml/min (H) 01/28/2013     Priority: Medium     CHF (NYHA class II, ACC/AHA stage C) (H) 08/15/2012     Priority: Medium     Hypertension goal BP (blood pressure) < 140/90 01/21/2011     Priority: Medium     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) 10/31/2010     Priority: Medium     Goal <8%       Hyperlipidemia LDL goal <100 10/31/2010     Priority: Medium     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) 03/15/2010     Priority: Medium     MI and open heart with 1 stent placed in 2008; another minor MI in 2009.  MI on 2/19/15. Coronary angiogram from [facility] on 2/19/15 showed severe 3 vessel native CAD (all three vessels [LAD, LCx and RCA] reported to be 100% ocludded at their ostia). All his grafts were considered to be occluded except for LIMA-to-LAD, which was patent and supplied left-to-left, as well as, left-to-right collaterals. There were no targets suitable for  revascularization and patient was put on medical therapy.          PAST MEDICAL HISTORY:  Past Medical History:   Diagnosis Date     CAD (coronary artery disease)      CHF (congestive heart failure) (H)      CKD (chronic kidney disease), stage III (H)      Cortical cataract of both eyes      Diabetes (H)      Hyperlipidemia      Hypertension      Infection due to Streptococcus mitis group 09/23/2020     Ischemic cardiomyopathy      Obesity      CHANTEL (obstructive sleep apnea)     occas cpap     CHANTEL (obstructive sleep apnea)- severe (AHI 30)      Osteoarthritis        CURRENT MEDICATIONS:  Prescription Medications as of 11/8/2023         Rx Number Disp Refills Start End Last Dispensed Date Next Fill Date Owning Pharmacy    acetaminophen (TYLENOL) 325 MG tablet  60 tablet 3 9/2/2020    [facility] - [place], [place] - [address] [facility]    Sig: Take 3 tablets (975 mg) by mouth every 8 hours as needed for mild pain    Class: E-Prescribe    Route: Oral    Alcohol Swabs PADS  100 each 0 8/3/2020    [address]    Sig: Use to swab the area of the injection or sergio as directed   Per insurance coverage    Class: Local Print    aspirin (ASA) 81 MG chewable tablet  120 tablet 0 10/6/2020    [place], [place] - [address] [place] [address]    Sig: Take 1 tablet (81 mg) by mouth daily    Class: E-Prescribe    Route: Oral    blood glucose (NO BRAND SPECIFIED) test strip  400 strip 3 8/24/2023    [id number] - [place], [place] - [address] AT [facility] [address]    Sig: Use to test blood sugar 4 times daily or as directed.    Class: E-Prescribe    blood glucose monitoring (ACCU-CHEK FELIPE SMARTVIEW) meter device kit  1 kit 0 2/20/2017    [facility] [address]    Sig: Use to test blood sugar 3-4 times daily, as directed.    Class: E-Prescribe    blood glucose monitoring  (SOFTCLIX) lancets  400 each 8 3/29/2022    [id number] [address] AT [address]    Si each 4 times daily Use to test blood sugars 2 times daily.    Class: E-Prescribe    Route: Does not apply    Calcium Carb-Cholecalciferol (CALCIUM CARBONATE-VITAMIN D3) 600-400 MG-UNIT TABS  180 tablet 3 2022    [facility] [address]     Sig: TAKE ONE TABLET BY MOUTH TWICE A DAY WITH MEALS    Class: E-Prescribe    Dulaglutide (TRULICITY) 3 MG/0.5ML SOPN  2 mL 3 2023    [facility] [address] [facility]    Sig: Inject 3 mg Subcutaneous once a week    Class: E-Prescribe    Route: Subcutaneous    ferrous sulfate (FEROSUL) 325 (65 Fe) MG tablet  90 tablet 3 2023    [facility] [place] [address] [address]     Sig: Take 1 tablet (325 mg) by mouth daily (with breakfast)    Class: E-Prescribe    Route: Oral    insulin lispro (HUMALOG KWIKPEN) 100 UNIT/ML (1 unit dial) KWIKPEN            Sig: Inject 8-14 Units Subcutaneous 3 times daily (before meals)    Class: Historical    Route: Subcutaneous    insulin NPH (HUMULIN N KWIKPEN) 100 UNIT/ML injection  45 mL 1 10/9/2023    [facility] - [place], [place] - [address]    Sig: GIve 40 units each morning and 12 at night subcutaneous    Class: E-Prescribe    insulin pen needle (32G X 4 MM) 32G X 4 MM miscellaneous  450 each 3 2023    [id number] [address] AT [address]    Sig: Use 5-6 pen needles daily or as directed.    Class: E-Prescribe    ketorolac (ACULAR) 0.5 % ophthalmic solution  10 mL 3 3/9/2023    [id number] [facility] [address] AT [facility] [address]    Sig: Place 1 drop into the right eye 2 times daily    Class: E-Prescribe    Route: Right Eye    latanoprost (XALATAN)  0.005 % ophthalmic solution            Sig: Place 1 drop into both eyes at bedtime    Class: Historical    Route: Both Eyes    magnesium oxide 400 MG tablet  180 tablet 1 6/20/2023    [facility] - [place], [place] - [address]    Sig: Take 1 tablet (400 mg) by mouth 2 times daily    Class: E-Prescribe    Route: Oral    mycophenolate (GENERIC EQUIVALENT) 500 MG tablet  180 tablet 11 6/15/2023    [facility] [place] [address] [address]    Sig: Take 3 tablets (1,500 mg) by mouth 2 times daily    Class: E-Prescribe    Notes to Pharmacy: TXP DT 7/19/2020 (Heart) TXP Dischg DT 8/3/2020 DX Heart transplant Z94.1 TX [facility] ([place], [place])    Route: Oral    omega-3 acid ethyl esters (LOVAZA) 1 g capsule  90 capsule 3 7/14/2023    [facility] - [place], [place] - [address]    Sig: Take 1 capsule (1 g) by mouth daily    Class: E-Prescribe    Route: Oral    prednisoLONE acetate (PRED FORTE) 1 % ophthalmic suspension  10 mL 3 3/9/2023    [facility] [address] [place] [address] [address]VE [facility]    Sig: Place 1 drop into the right eye 2 times daily    Class: E-Prescribe    Route: Right Eye    rosuvastatin (CRESTOR) 20 MG tablet  90 tablet 3 2/24/2023    [facility] [address] [place] [address] AVE NE AT [facility]    Sig: Take 1 tablet (20 mg) by mouth daily    Class: E-Prescribe    Route: Oral    senna-docusate (SENOKOT-S/PERICOLACE) 8.6-50 MG tablet  60 tablet 3 12/1/2021    [facility] [place] [address] [address]    Sig: Take 1 tablet by mouth 2 times daily as needed (hold for loose stools)    Class: E-Prescribe    Route: Oral    sirolimus (GENERIC EQUIVALENT) 0.5 MG tablet  180 tablet 11 7/17/2023    [facility] [place] [address]    Sig: Take 6 tablets  (3 mg) by mouth daily    Class: E-Prescribe    Notes to Pharmacy: TXP DT 7/19/2020 (Heart) TXP Dischg DT 8/3/2020 DX Heart transplant Z94.1 TX [facility] ([place], [place]) Dose adjustment    Route: Oral    No prior authorization was found for this prescription.    Found prior authorization for another prescription for the same medication: Canceled - Other (The medication order is discontinued.)    sulfamethoxazole-trimethoprim (BACTRIM) 400-80 MG tablet  36 tablet 3 10/26/2023    [facility] - [place], [place] - [address]    Sig: Take 1 tablet by mouth three times a week    Class: E-Prescribe    Route: Oral    tamsulosin (FLOMAX) 0.4 MG capsule  90 capsule 3 9/2/2022    [facility] - [place], [place] - [address]     Sig: TAKE ONE CAPSULE BY MOUTH ONCE DAILY    Class: E-Prescribe    empagliflozin (JARDIANCE) 10 MG TABS tablet            Sig: Take 10 mg by mouth daily    Class: Historical    Route: Oral            ROS:   A comprehensive ROS was completed and found to be negative except for that noted in the HPI.    Exam:  /74 (BP Location: Right arm, Patient Position: Chair, Cuff Size: Adult Regular)   Pulse 87   Wt 78.5 kg (173 lb)   SpO2 100%   BMI 27.10 kg/m    In general, the patient is a pleasant male in no apparent distress.    HEENT: NC/AT. PERRLA. EOMI.  Sclerae white, not injected.    Neck:  No adenopathy, No thyromegaly.    COR: JVD at clavicle when sitting upright.  RRR.  Normal S1 S2 splits physiologically.  No murmur, rub click, or gallop.    Lungs:  CTA. No rhonchi.    Abdomen: soft, nontender, nondistended.  No organomegaly.  Extremities:  No clubbing or cyanosis, trace bilateral LE edema.    Neuro: Alert & Oriented x 3, grossly non focal.  Integument: no open lesions, rashes, or jaundice.    Labs:  CBC RESULTS:   Lab Results   Component Value Date    WBC 3.7 (L) 10/25/2023    WBC 5.1  "05/11/2021    RBC 2.94 (L) 10/25/2023    RBC 4.47 05/11/2021    HGB 8.0 (L) 10/25/2023    HGB 13.4 05/11/2021    HCT 24.1 (L) 10/25/2023    HCT 41.5 05/11/2021    MCV 82 10/25/2023    MCV 93 05/11/2021    MCH 27.2 10/25/2023    MCH 30.0 05/11/2021    MCHC 33.2 10/25/2023    MCHC 32.3 05/11/2021    RDW 14.2 10/25/2023    RDW 13.2 05/11/2021     10/25/2023     05/11/2021       BMP RESULTS:  Lab Results   Component Value Date     11/08/2023     05/11/2021    POTASSIUM 4.1 11/08/2023    POTASSIUM 4.4 03/14/2022    POTASSIUM 4.0 05/11/2021    CHLORIDE 108 (H) 11/08/2023    CHLORIDE 109 03/14/2022    CHLORIDE 107 05/11/2021    CO2 21 (L) 11/08/2023    CO2 26 03/14/2022    CO2 27 05/11/2021    ANIONGAP 10 11/08/2023    ANIONGAP 6 03/14/2022    ANIONGAP 6 05/11/2021     (H) 11/08/2023     (H) 10/15/2023     (H) 03/14/2022    GLC 98 05/11/2021    BUN 17.7 11/08/2023    BUN 29 03/14/2022    BUN 34 (H) 05/11/2021    CR 1.81 (H) 11/08/2023    CR 1.84 (H) 05/11/2021    GFRESTIMATED 40 (L) 11/08/2023    GFRESTIMATED 37 (L) 05/11/2021    GFRESTBLACK 43 (L) 05/11/2021    BRYANNA 8.5 (L) 11/08/2023    BRYANNA 9.0 05/11/2021      LIPID RESULTS:  Lab Results   Component Value Date    CHOL 92 07/17/2023    CHOL 133 01/05/2021    HDL 30 (L) 07/17/2023    HDL 40 01/05/2021    LDL 26 07/17/2023    LDL 58 01/05/2021    TRIG 178 (H) 07/17/2023    TRIG 179 (H) 01/05/2021    CHOLHDLRATIO 2.6 06/27/2015    NHDL 62 07/17/2023    NHDL 94 01/05/2021       IMMUNOSUPPRESSANT LEVELS  Lab Results   Component Value Date    TACROL 4.5 (L) 02/14/2023    TACROL 5.6 05/11/2021    DOSTAC 2/13/2023 02/14/2023    DOSTAC  7pm 5/10/21 05/11/2021    RAPAMY 6.2 10/18/2023       No components found for: \"CK\"  Lab Results   Component Value Date    MAG 1.5 (L) 11/08/2023    MAG 1.8 05/11/2021     Lab Results   Component Value Date    A1C 8.7 (H) 07/17/2023    A1C 6.7 (H) 01/14/2021     Lab Results   Component Value Date    " PHOS 1.9 (L) 11/08/2023    PHOS 3.2 05/11/2021     Lab Results   Component Value Date    NTBNPI 8,792 (H) 09/22/2020     Lab Results   Component Value Date    SAITESTMET SA EDTA FCS 07/17/2023    SAITESTMET SA FCS 01/05/2021    SAICELL Class I 07/17/2023    SAICELL Class I 01/05/2021    [id number] None 07/17/2023    [id number] None 01/05/2021    [id number] None 07/17/2023    [id number] None 01/05/2021    SAIREPCOM  07/17/2023      HLA PRA Test performed by modified testing procedure that may also include pretreatment of serum. Pretreatment may be the addition of fetal calf serum, EDTA, and/or adsorption.  High-risk, MFI > 3,000.  Mod-risk, -3,000.    SAIREPCOM  01/05/2021      Test performed by modified procedure. Serum heat inactivated and tested   by a modified ([place]) protocol including fetal calf serum addition.   High-risk, mfi >3,000. Mod-risk, mfi 500-3,000.       Lab Results   Component Value Date    SAIITESTME SA EDTA FCS 07/17/2023    SAIITESTME SA FCS 01/05/2021    SAIICELL Class II 07/17/2023    SAIICELL Class II 01/05/2021    [id number] None 07/17/2023    [id number] None 01/05/2021    [id number] None 07/17/2023    [id number] None 01/05/2021    SAIIREPCOM  07/17/2023      HLA PRA Test performed by modified testing procedure that may also include pretreatment of serum. Pretreatment may be the addition of fetal calf serum, EDTA, and/or adsorption.  High-risk, MFI > 3,000.  Mod-risk, -3,000.    SAIIREPCOM  01/05/2021      Test performed by modified procedure. Serum heat inactivated and tested   by a modified ([place]) protocol including fetal calf serum addition.   High-risk, mfi >3,000. Mod-risk, mfi 500-3,000.       Lab Results   Component Value Date    CSPEC EDTA PLASMA 05/11/2021       Assessment and Plan:  Mr. Lucian Oliveira is a 68yr old male with a history of CAD (s/p 3v CABG 2008), ICM s/p OHT 7/19/20, DMII, CKD, and HTN who presents to clinic for follow-up.  He declined a professional  , and is accompanied by his wife.    Labs today show stable electrolytes and renal function.  CBC shows hgb 6.9.    Mr. Oliveira appears stable.  His BPs remain stable.  His weight is up slightly, but he appears euvolemic.  Of note, he remains off of lisinopril and diuretics since his hospital discharge.    His Hgb is down today.  He denies any signs of bleeding, and reports that his stools are normal.  Will start by repeating a hgb, as well as B12, folate, iron levels, erythropoietin, retic count, and LDH.  If all return normal, will refer to the Anemia Clinic for further evaluation/management -- d/w Dr. Sheridan.    We will plan for him to follow-up in the next 1-2 months, or sooner should new concerns arise.      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  He ultimately discharged 8/3/20.     He has been readmitted as follows:  - 8/24/20-8/28/20:  hyperglycemia (BGs 500s), thought to be secondary to incorrect home insulin administration  - 9/22/20-10/5/20:  fevers and drainage from former ICD site, with Chest/abd CT concerning for cellulitis in the epigastric region.  He underwent surgical debridement of his left infraclavicular wound and debridement of midline chest tube wound on 9/23, which was c/b bleeding and required reoperation 9/28 for I&D of hematoma.  Cultures were positive for Pseudomonas aeruginosa, so he was treated with 4 weeks of cipro per Transplant ID and CVTS.  He developed leukopenia and moderate neutropenia, so his cellcept was held then restarted at low dose, and valcyte was stopped.  He received neupogen x 1.  He ultimately discharged with a wound  "vac.  - 12/9/20:  ED visit for right vision loss, and found to have a vitreous hemorrhage in his right eye and total retinal detachment of his left eye per ophthalmology.  He was then seen in the eye clinic 12/10, where he was advised an Avastin injection in the right eye (for proliferative diabetic retinopathy and vitreous hemorrhage).  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  Last RHC:  7/2023 showed elevated biventricular filling pressures, with RA 14, mPA 30, PCW 22, and CI 3.4.  Echo/cMRI: cMRI 9/2022 showed normal biventricular function (LVEF 74%, RVEF 59%) and was negative for ischemia but revealed a new foci of mesocardial/epicardial LGE in the midventricular inferoseptum and anterior segments in a nonischemic pattern.  TTE 7/2023 showed stable graft function, with LVEF 55-60% and normal RV size/function.     Serostatus:  - CMV D+/R+  - EBV D+/R+  - Toxo D+/R-     Immunosuppression:  - Sirolimus, goal level 6-8.  Level 10/18 was 6.2.  - MMF 1500mg twice daily     PPx:  - CAV:  Aspirin 81mg daily and rosuvastatin 20mg daily  - GI:  n/a  - Osteoporosis:  Calcium/vitamin D supplements  - Toxo:  single strength bactrim three times per week (qMWF), lifelong ppx     Graft function:  - BPs: Controlled, lisinopril was held at recent hospital discharge.  Asked that he call if BPs persist > 130/90.  - fluid status:  Euvolemic, Lasix was held at recent hospital discharge.  Asked that he call with any signs of fluid retention/ongoing weight gain.     Health " "maintenance:  - eye exam UTD  - pneumonia shots unclear --> discuss with PCP  - completed shingrix        Acute anemia  Hgb down today, repeating and adding additional studies as noted above.  Will refer to anemia clinic pending results.    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     Hypertriglyceridemia  Triglycerides have been elevated, up to 300s since transplant.  Last triglyceride level was 178 (7/2023).  Continue rosuvastatin as above.     DMII  Follows with endo and PCP.  On jardiance, trulicity, insulin.     HIT  HIT antibody + 7/2020.  CORRINE +.  No heparin products.     RIJ and axillary vein DVT, nonocclusive  Right cephalic vein occlusive thrombus  S/p course of anticoagulation.     Total retinal detachment of the left eye, s/p retinectomy 12/21/20  Proliferative diabetic retinopathy  Right vitreous hemorrhage  He presented to the ED 12/9/20 with right vision loss.  Ophthalmology was consulted, and found that he had a vitreous hemorrhage in his right eye and total retinal detachment of his left eye.  He was then seen in the eye clinic 12/10/20, where he was advised an Avastin injection in the right eye (for proliferative diabetic retinopathy and vitreous hemorrhage).  He then underwent \"27 gauge pars plana vitectomy, membrane peel, perfluoroctane liquid (PFO), retinectomy, endolaser\" 12/21/20 for the tractional retinal detachment of his left eye.  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  He continues to follow with ophthalmology, and notes improvement in his right-eye vision.          The above was reviewed with Mr. Oliveira, who verbalized understanding and will call with further questions/concerns.     50 minutes spent with patient, along with preparing for visit, reviewing follow up, and completing " documentation.        Arlin Guajardo DNP, FN-BC  Advanced Heart Failure Nurse Practitioner  [facility]  Patient Care Team:  No Ref-Primary, Physician as PCP - Diane Groves APRN CNP as Nurse Practitioner (Cardiology)  Arvin Sheridan MD as MD (Cardiology)  Yue Dowd MD as MD (INTERNAL MEDICINE - ENDOCRINOLOGY, DIABETES & METABOLISM)  Christelle Pettit, RN as Transplant Coordinator (Cardiology)  Negrito Rai [facility] as Pharmacist (Pharmacist)  [facility] ([place]  Triny Bunch MD as Assigned Surgical Provider  Marisabel Prieto PA-C as Assigned Endocrinology Provider  Arlin Guajardo NP as Referring Physician (Interventional Cardiology)  Triny Bunch MD as MD (Ophthalmology)  Fracisco Casiano MD as Assigned PCP  Arvin Sheridan MD as Referring Physician (Cardiovascular Disease)  Cristian Delacruz MD as MD (Dermatology)  Cristian Delacruz MD as MD (Dermatology)  Mónica Shelton MD as MD (Nephrology)  Mónica Shelton MD as Assigned Nephrology Provider  Arvin Sheridan MD as Assigned Heart and Vascular Provider  Xiao Gimenez, RN as Specialty Care Coordinator (Nephrology)  ARVIN SHERIDAN      Please do not hesitate to contact me if you have any questions/concerns.     Sincerely,     Arlin Guajardo NP

## 2023-11-09 DIAGNOSIS — D63.1 ANEMIA OF CHRONIC RENAL FAILURE, STAGE 4 (SEVERE) (H): ICD-10-CM

## 2023-11-09 DIAGNOSIS — Z94.1 HEART REPLACED BY TRANSPLANT (H): Primary | ICD-10-CM

## 2023-11-09 DIAGNOSIS — N18.4 ANEMIA OF CHRONIC RENAL FAILURE, STAGE 4 (SEVERE) (H): ICD-10-CM

## 2023-11-09 LAB — EPO SERPL-ACNC: 349 MU/ML

## 2023-11-10 ENCOUNTER — APPOINTMENT (OUTPATIENT)
Dept: INTERPRETER SERVICES | Facility: CLINIC | Age: 68
End: 2023-11-10
Payer: COMMERCIAL

## 2023-11-10 ENCOUNTER — PATIENT OUTREACH (OUTPATIENT)
Dept: CARE COORDINATION | Facility: CLINIC | Age: 68
End: 2023-11-10
Payer: COMMERCIAL

## 2023-11-10 DIAGNOSIS — N18.4 CKD (CHRONIC KIDNEY DISEASE) STAGE 4, GFR 15-29 ML/MIN (H): ICD-10-CM

## 2023-11-10 DIAGNOSIS — N18.4 ANEMIA OF CHRONIC RENAL FAILURE, STAGE 4 (SEVERE) (H): Primary | ICD-10-CM

## 2023-11-10 DIAGNOSIS — Z94.1 HEART TRANSPLANT, ORTHOTOPIC, STATUS (H): ICD-10-CM

## 2023-11-10 DIAGNOSIS — D63.1 ANEMIA OF CHRONIC RENAL FAILURE, STAGE 4 (SEVERE) (H): Primary | ICD-10-CM

## 2023-11-10 NOTE — PROGRESS NOTES
Anemia Management Note - Enrollment  SUBJECTIVE/OBJECTIVE:    Referred by Arlin Guajardo NP  on 2023  Primary Diagnosis: Anemia in Chronic Kidney Disease (N18.4, D63.1)     Secondary Diagnosis:  Other-Heart Transplant (Z94.1)  Transplant [id number]  Hgb goal range:  9-10  Epo/Darbo: Aranesp  40 mcg  every two weeks  In clinic  Iron regimen:  Ferrous Sulfate  once daily  Labs : 438388  Recent DANDY use, transfusion, IV iron: none  RX/TX plans : 775725  No history of stroke, MI, or cancers. Hx venous thrombus. Previously anticoagulated.  Contact:  Ok to leave message regarding Scheduling, billing and medical information per consent to communicate dated 310    OK to speak with children Elisabeth Rivers and Lucian Oliveira Jr. regarding Scheduling, billing and medical information per consent to communicate dated 310      Latest Ref Rng & Units 10/13/2023     6:09 AM 10/14/2023     7:32 AM 10/15/2023     7:02 AM 10/18/2023    12:06 PM 10/25/2023    10:05 AM 2023     9:08 AM 2023    10:53 AM   Anemia   Hemoglobin 13.3 - 17.7 g/dL 8.2  8.5  8.4  8.3  8.0  6.9  6.8        BP Readings from Last 3 Encounters:   23 139/74   23 128/75   10/25/23 120/74     Wt Readings from Last 2 Encounters:   23 78.5 kg (173 lb)   23 78 kg (172 lb)     Current Outpatient Medications   Medication Sig Dispense Refill    acetaminophen (TYLENOL) 325 MG tablet Take 3 tablets (975 mg) by mouth every 8 hours as needed for mild pain 60 tablet 3    Alcohol Swabs PADS Use to swab the area of the injection or sergio as directed   Per insurance coverage 100 each 0    aspirin (ASA) 81 MG chewable tablet Take 1 tablet (81 mg) by mouth daily (Patient taking differently: Take 81 mg by mouth every morning) 120 tablet 0    blood glucose (NO BRAND SPECIFIED) test strip Use to test blood sugar 4 times daily or as directed. 400 strip 3    blood glucose monitoring (ACCU-CHEK FELIPE SMARTVIEW) meter device kit Use to  test blood sugar 3-4 times daily, as directed. 1 kit 0    blood glucose monitoring (SOFTCLIX) lancets 1 each 4 times daily Use to test blood sugars 2 times daily. 400 each 8    Calcium Carb-Cholecalciferol (CALCIUM CARBONATE-VITAMIN D3) 600-400 MG-UNIT TABS TAKE ONE TABLET BY MOUTH TWICE A DAY WITH MEALS 180 tablet 3    Dulaglutide (TRULICITY) 3 MG/0.5ML SOPN Inject 3 mg Subcutaneous once a week 2 mL 3    empagliflozin (JARDIANCE) 10 MG TABS tablet Take 10 mg by mouth daily (Patient not taking: Reported on 11/8/2023)      ferrous sulfate (FEROSUL) 325 (65 Fe) MG tablet Take 1 tablet (325 mg) by mouth daily (with breakfast) 90 tablet 3    insulin lispro (HUMALOG KWIKPEN) 100 UNIT/ML (1 unit dial) KWIKPEN Inject 8-14 Units Subcutaneous 3 times daily (before meals)      insulin NPH (HUMULIN N KWIKPEN) 100 UNIT/ML injection GIve 40 units each morning and 12 at night subcutaneous 45 mL 1    insulin pen needle (32G X 4 MM) 32G X 4 MM miscellaneous Use 5-6 pen needles daily or as directed. 450 each 3    ketorolac (ACULAR) 0.5 % ophthalmic solution Place 1 drop into the right eye 2 times daily 10 mL 3    latanoprost (XALATAN) 0.005 % ophthalmic solution Place 1 drop into both eyes at bedtime      magnesium oxide 400 MG tablet Take 1 tablet (400 mg) by mouth 2 times daily 180 tablet 1    mycophenolate (GENERIC EQUIVALENT) 500 MG tablet Take 3 tablets (1,500 mg) by mouth 2 times daily 180 tablet 11    omega-3 acid ethyl esters (LOVAZA) 1 g capsule Take 1 capsule (1 g) by mouth daily 90 capsule 3    prednisoLONE acetate (PRED FORTE) 1 % ophthalmic suspension Place 1 drop into the right eye 2 times daily 10 mL 3    rosuvastatin (CRESTOR) 20 MG tablet Take 1 tablet (20 mg) by mouth daily 90 tablet 3    senna-docusate (SENOKOT-S/PERICOLACE) 8.6-50 MG tablet Take 1 tablet by mouth 2 times daily as needed (hold for loose stools) 60 tablet 3    sirolimus (GENERIC EQUIVALENT) 0.5 MG tablet Take 6 tablets (3 mg) by mouth daily 180  tablet 11    sulfamethoxazole-trimethoprim (BACTRIM) 400-80 MG tablet Take 1 tablet by mouth three times a week 36 tablet 3    tamsulosin (FLOMAX) 0.4 MG capsule TAKE ONE CAPSULE BY MOUTH ONCE DAILY 90 capsule 3     ASSESSMENT:  Hgb Not at goal/Initiation of therapy   Ferritin: Due for labs  TSat: at goal >30%  Iron regimen recommended: TBD  Recommended DANDY regimen : Aranesp  40 mcg  every two weeks  In clinic  Blood Pressure: monitor closely. Hx HTN    PLAN:  1. Patient called today for enrollment in Anemia Management Service.  2. Discussed:  anemia overview, monitoring service, and goal hemoglobin range and rationale and risks of DANDY blood clots, stroke, and increase in blood pressure  3. Dose location: in clinic [facility]  4. Labs: [place]  5. Pharmacy: [facility] asked that Dr. Sheridan agree with the plan to start DANDY, as 'he manages all my medications'. RN will reach out to provider and call patient back early next week after hearing back from provider.   Patient verbalized understanding of the plan.     Next call date:  [id number]    Carrie Leopold, RN BSN  Anemia Services  [facility]  [email or web address]  Office: [phone]  Fax [phone]  **NOTE: Anemia Management Services staff will be out of the office on Thanksgiving, 11/23 and Friday, 11/24.   Please reach out to your nurse care coordinator for any questions or concerns during this time.

## 2023-11-13 DIAGNOSIS — Z94.1 HEART TRANSPLANT, ORTHOTOPIC, STATUS (H): ICD-10-CM

## 2023-11-13 PROBLEM — D63.1 ANEMIA OF CHRONIC RENAL FAILURE, STAGE 4 (SEVERE) (H): Status: ACTIVE | Noted: 2023-11-13

## 2023-11-13 PROBLEM — N18.4 ANEMIA OF CHRONIC RENAL FAILURE, STAGE 4 (SEVERE) (H): Status: ACTIVE | Noted: 2023-11-13

## 2023-11-13 RX ORDER — ALBUTEROL SULFATE 90 UG/1
1-2 AEROSOL, METERED RESPIRATORY (INHALATION)
Status: CANCELLED
Start: 2023-11-13

## 2023-11-13 RX ORDER — ALBUTEROL SULFATE 0.83 MG/ML
2.5 SOLUTION RESPIRATORY (INHALATION)
Status: CANCELLED | OUTPATIENT
Start: 2023-11-13

## 2023-11-13 RX ORDER — DIPHENHYDRAMINE HYDROCHLORIDE 50 MG/ML
50 INJECTION INTRAMUSCULAR; INTRAVENOUS
Status: CANCELLED
Start: 2023-11-13

## 2023-11-13 RX ORDER — EPINEPHRINE 1 MG/ML
0.3 INJECTION, SOLUTION, CONCENTRATE INTRAVENOUS EVERY 5 MIN PRN
Status: CANCELLED | OUTPATIENT
Start: 2023-11-13

## 2023-11-13 RX ORDER — MEPERIDINE HYDROCHLORIDE 25 MG/ML
25 INJECTION INTRAMUSCULAR; INTRAVENOUS; SUBCUTANEOUS EVERY 30 MIN PRN
Status: CANCELLED | OUTPATIENT
Start: 2023-11-13

## 2023-11-13 RX ORDER — METHYLPREDNISOLONE SODIUM SUCCINATE 125 MG/2ML
125 INJECTION, POWDER, LYOPHILIZED, FOR SOLUTION INTRAMUSCULAR; INTRAVENOUS
Status: CANCELLED
Start: 2023-11-13

## 2023-11-13 NOTE — PROGRESS NOTES
Follow-up with anemia management service:  Received: Yesterday  Arlin Guajardo, NP Leopold, Carrie A, RN; Arvin Sheridan MD; Christelle Pettit RN Hi, Christelle/Sharmila!  Would you mind letting him know that I reviewed this plan with Dr. SR, and that he is on board with their management?  Thanks,  Arlin    Spoke to [facility], explained that Dr. Damon is in agreement with starting Aranesp. Therapy plan entered and he should expect a call from scheduling. He had no questions at this time.  RN invited him to call if any concerns come up.        Latest Ref Rng & Units 10/13/2023     6:09 AM 10/14/2023     7:32 AM 10/15/2023     7:02 AM 10/18/2023    12:06 PM 10/25/2023    10:05 AM 11/8/2023     9:08 AM 11/8/2023    10:53 AM   Anemia   Hemoglobin 13.3 - 17.7 g/dL 8.2  8.5  8.4  8.3  8.0  6.9  6.8      Follow-up call date: [id number] Aranesp scheduled?    Carrie Leopold, RN BSN  Anemia Services  [facility]  [email or web address]  Office: [phone]  Fax [phone]  **NOTE: Anemia Management Services staff will be out of the office on Thanksgiving, 11/23 and Friday, 11/24.   Please reach out to your nurse care coordinator for any questions or concerns during this time.

## 2023-11-14 RX ORDER — TAMSULOSIN HYDROCHLORIDE 0.4 MG/1
CAPSULE ORAL
Qty: 90 CAPSULE | Refills: 3 | Status: SHIPPED | OUTPATIENT
Start: 2023-11-14

## 2023-11-15 ENCOUNTER — TELEPHONE (OUTPATIENT)
Dept: TRANSPLANT | Facility: CLINIC | Age: 68
End: 2023-11-15
Payer: COMMERCIAL

## 2023-11-15 DIAGNOSIS — Z94.1 HEART REPLACED BY TRANSPLANT (H): Primary | ICD-10-CM

## 2023-11-15 NOTE — TELEPHONE ENCOUNTER
Pt called using .  Pt to have his HGB rechecked tomorrow AM.  Appt made for pt at [facility].  Also spoke with pt's daughter, Elisabeth, to update her on plan of care.  Will follow up with pt tomorrow after labs are resulted.  Pt states understanding.

## 2023-11-16 ENCOUNTER — HOSPITAL ENCOUNTER (OUTPATIENT)
Facility: CLINIC | Age: 68
Setting detail: OBSERVATION
Discharge: HOME | End: 2023-11-17
Attending: EMERGENCY MEDICINE | Admitting: ORTHOPAEDIC SURGERY
Payer: COMMERCIAL

## 2023-11-16 ENCOUNTER — LAB (OUTPATIENT)
Dept: LAB | Facility: CLINIC | Age: 68
End: 2023-11-16
Payer: COMMERCIAL

## 2023-11-16 ENCOUNTER — PATIENT OUTREACH (OUTPATIENT)
Dept: CARE COORDINATION | Facility: CLINIC | Age: 68
End: 2023-11-16

## 2023-11-16 ENCOUNTER — TELEPHONE (OUTPATIENT)
Dept: TRANSPLANT | Facility: CLINIC | Age: 68
End: 2023-11-16

## 2023-11-16 ENCOUNTER — TELEPHONE (OUTPATIENT)
Dept: CARDIOLOGY | Facility: CLINIC | Age: 68
End: 2023-11-16

## 2023-11-16 DIAGNOSIS — N18.4 ANEMIA OF CHRONIC RENAL FAILURE, STAGE 4 (SEVERE) (H): ICD-10-CM

## 2023-11-16 DIAGNOSIS — D64.9 ANEMIA, UNSPECIFIED TYPE: ICD-10-CM

## 2023-11-16 DIAGNOSIS — R53.1 WEAKNESS GENERALIZED: ICD-10-CM

## 2023-11-16 DIAGNOSIS — Z94.1 HEART REPLACED BY TRANSPLANT (H): ICD-10-CM

## 2023-11-16 DIAGNOSIS — D63.1 ANEMIA OF CHRONIC RENAL FAILURE, STAGE 4 (SEVERE) (H): ICD-10-CM

## 2023-11-16 LAB
ABO/RH(D): NORMAL
ALBUMIN SERPL BCG-MCNC: 3.6 G/DL (ref 3.5–5.2)
ALP SERPL-CCNC: 129 U/L (ref 40–150)
ALT SERPL W P-5'-P-CCNC: ABNORMAL U/L
ANION GAP SERPL CALCULATED.3IONS-SCNC: 12 MMOL/L (ref 7–15)
ANTIBODY SCREEN: NEGATIVE
AST SERPL W P-5'-P-CCNC: ABNORMAL U/L
ATRIAL RATE - MUSE: 91 BPM
BASOPHILS # BLD AUTO: 0 10E3/UL (ref 0–0.2)
BASOPHILS # BLD AUTO: 0 10E3/UL (ref 0–0.2)
BASOPHILS NFR BLD AUTO: 0 %
BASOPHILS NFR BLD AUTO: 0 %
BILIRUB SERPL-MCNC: 0.2 MG/DL
BLD PROD TYP BPU: NORMAL
BLOOD COMPONENT TYPE: NORMAL
BUN SERPL-MCNC: 16.7 MG/DL (ref 8–23)
CALCIUM SERPL-MCNC: 8.5 MG/DL (ref 8.8–10.2)
CHLORIDE SERPL-SCNC: 105 MMOL/L (ref 98–107)
CODING SYSTEM: NORMAL
CREAT SERPL-MCNC: 1.88 MG/DL (ref 0.67–1.17)
CROSSMATCH: NORMAL
DEPRECATED HCO3 PLAS-SCNC: 22 MMOL/L (ref 22–29)
DIASTOLIC BLOOD PRESSURE - MUSE: NORMAL MMHG
EGFRCR SERPLBLD CKD-EPI 2021: 38 ML/MIN/1.73M2
EOSINOPHIL # BLD AUTO: 0 10E3/UL (ref 0–0.7)
EOSINOPHIL # BLD AUTO: 0.1 10E3/UL (ref 0–0.7)
EOSINOPHIL NFR BLD AUTO: 1 %
EOSINOPHIL NFR BLD AUTO: 1 %
ERYTHROCYTE [DISTWIDTH] IN BLOOD BY AUTOMATED COUNT: 16.4 % (ref 10–15)
ERYTHROCYTE [DISTWIDTH] IN BLOOD BY AUTOMATED COUNT: 16.9 % (ref 10–15)
FERRITIN SERPL-MCNC: 334 NG/ML (ref 31–409)
FOLATE SERPL-MCNC: 10.2 NG/ML (ref 4.6–34.8)
GLUCOSE BLDC GLUCOMTR-MCNC: 207 MG/DL (ref 70–99)
GLUCOSE SERPL-MCNC: 239 MG/DL (ref 70–99)
HAPTOGLOB SERPL-MCNC: 342 MG/DL (ref 30–200)
HCT VFR BLD AUTO: 18.5 % (ref 40–53)
HCT VFR BLD AUTO: 19.6 % (ref 40–53)
HGB BLD-MCNC: 5.8 G/DL (ref 13.3–17.7)
HGB BLD-MCNC: 6.4 G/DL (ref 13.3–17.7)
IMM GRANULOCYTES # BLD: 0 10E3/UL
IMM GRANULOCYTES # BLD: 0 10E3/UL
IMM GRANULOCYTES NFR BLD: 1 %
IMM GRANULOCYTES NFR BLD: 1 %
INTERPRETATION ECG - MUSE: NORMAL
IRON BINDING CAPACITY (ROCHE): 205 UG/DL (ref 240–430)
IRON SATN MFR SERPL: 27 % (ref 15–46)
IRON SERPL-MCNC: 55 UG/DL (ref 61–157)
ISSUE DATE AND TIME: NORMAL
LDH SERPL L TO P-CCNC: 194 U/L (ref 0–250)
LYMPHOCYTES # BLD AUTO: 0.8 10E3/UL (ref 0.8–5.3)
LYMPHOCYTES # BLD AUTO: 0.9 10E3/UL (ref 0.8–5.3)
LYMPHOCYTES NFR BLD AUTO: 19 %
LYMPHOCYTES NFR BLD AUTO: 27 %
MCH RBC QN AUTO: 27.9 PG (ref 26.5–33)
MCH RBC QN AUTO: 28.3 PG (ref 26.5–33)
MCHC RBC AUTO-ENTMCNC: 31.4 G/DL (ref 31.5–36.5)
MCHC RBC AUTO-ENTMCNC: 32.7 G/DL (ref 31.5–36.5)
MCV RBC AUTO: 87 FL (ref 78–100)
MCV RBC AUTO: 89 FL (ref 78–100)
MONOCYTES # BLD AUTO: 0.3 10E3/UL (ref 0–1.3)
MONOCYTES # BLD AUTO: 0.4 10E3/UL (ref 0–1.3)
MONOCYTES NFR BLD AUTO: 8 %
MONOCYTES NFR BLD AUTO: 9 %
NEUTROPHILS # BLD AUTO: 2.2 10E3/UL (ref 1.6–8.3)
NEUTROPHILS # BLD AUTO: 3 10E3/UL (ref 1.6–8.3)
NEUTROPHILS NFR BLD AUTO: 63 %
NEUTROPHILS NFR BLD AUTO: 70 %
NRBC # BLD AUTO: 0 10E3/UL
NRBC # BLD AUTO: 0 10E3/UL
NRBC BLD AUTO-RTO: 0 /100
NRBC BLD AUTO-RTO: 0 /100
P AXIS - MUSE: 52 DEGREES
PLATELET # BLD AUTO: 146 10E3/UL (ref 150–450)
PLATELET # BLD AUTO: 165 10E3/UL (ref 150–450)
POTASSIUM SERPL-SCNC: 4.1 MMOL/L (ref 3.4–5.3)
PR INTERVAL - MUSE: 106 MS
PROT SERPL-MCNC: 6 G/DL (ref 6.4–8.3)
QRS DURATION - MUSE: 86 MS
QT - MUSE: 352 MS
QTC - MUSE: 432 MS
R AXIS - MUSE: -21 DEGREES
RBC # BLD AUTO: 2.08 10E6/UL (ref 4.4–5.9)
RBC # BLD AUTO: 2.26 10E6/UL (ref 4.4–5.9)
SODIUM SERPL-SCNC: 139 MMOL/L (ref 135–145)
SPECIMEN EXPIRATION DATE: NORMAL
SYSTOLIC BLOOD PRESSURE - MUSE: NORMAL MMHG
T AXIS - MUSE: 8 DEGREES
UNIT ABO/RH: NORMAL
UNIT NUMBER: NORMAL
UNIT STATUS: NORMAL
UNIT TYPE ISBT: 5100
VENTRICULAR RATE- MUSE: 91 BPM
VIT B12 SERPL-MCNC: 317 PG/ML (ref 232–1245)
WBC # BLD AUTO: 3.4 10E3/UL (ref 4–11)
WBC # BLD AUTO: 4.2 10E3/UL (ref 4–11)

## 2023-11-16 PROCEDURE — 99000 SPECIMEN HANDLING OFFICE-LAB: CPT | Performed by: PATHOLOGY

## 2023-11-16 PROCEDURE — 86923 COMPATIBILITY TEST ELECTRIC: CPT | Performed by: EMERGENCY MEDICINE

## 2023-11-16 PROCEDURE — 83010 ASSAY OF HAPTOGLOBIN QUANT: CPT | Performed by: NURSE PRACTITIONER

## 2023-11-16 PROCEDURE — 99222 1ST HOSP IP/OBS MODERATE 55: CPT | Mod: AI | Performed by: NURSE PRACTITIONER

## 2023-11-16 PROCEDURE — 93010 ELECTROCARDIOGRAM REPORT: CPT | Performed by: EMERGENCY MEDICINE

## 2023-11-16 PROCEDURE — 82728 ASSAY OF FERRITIN: CPT | Performed by: PATHOLOGY

## 2023-11-16 PROCEDURE — 36415 COLL VENOUS BLD VENIPUNCTURE: CPT | Performed by: EMERGENCY MEDICINE

## 2023-11-16 PROCEDURE — G0378 HOSPITAL OBSERVATION PER HR: HCPCS

## 2023-11-16 PROCEDURE — 99285 EMERGENCY DEPT VISIT HI MDM: CPT | Performed by: EMERGENCY MEDICINE

## 2023-11-16 PROCEDURE — 86923 COMPATIBILITY TEST ELECTRIC: CPT

## 2023-11-16 PROCEDURE — P9016 RBC LEUKOCYTES REDUCED: HCPCS | Performed by: EMERGENCY MEDICINE

## 2023-11-16 PROCEDURE — 999N000248 HC STATISTIC IV INSERT WITH US BY RN

## 2023-11-16 PROCEDURE — 99285 EMERGENCY DEPT VISIT HI MDM: CPT | Mod: 25 | Performed by: EMERGENCY MEDICINE

## 2023-11-16 PROCEDURE — 83615 LACTATE (LD) (LDH) ENZYME: CPT | Performed by: NURSE PRACTITIONER

## 2023-11-16 PROCEDURE — 99207 PR APP CREDIT; MD BILLING SHARED VISIT: CPT | Mod: FS | Performed by: ORTHOPAEDIC SURGERY

## 2023-11-16 PROCEDURE — 86901 BLOOD TYPING SEROLOGIC RH(D): CPT | Performed by: EMERGENCY MEDICINE

## 2023-11-16 PROCEDURE — 83540 ASSAY OF IRON: CPT | Performed by: PATHOLOGY

## 2023-11-16 PROCEDURE — 82746 ASSAY OF FOLIC ACID SERUM: CPT | Performed by: NURSE PRACTITIONER

## 2023-11-16 PROCEDURE — 85025 COMPLETE CBC W/AUTO DIFF WBC: CPT | Performed by: EMERGENCY MEDICINE

## 2023-11-16 PROCEDURE — 85025 COMPLETE CBC W/AUTO DIFF WBC: CPT | Performed by: PATHOLOGY

## 2023-11-16 PROCEDURE — 36430 TRANSFUSION BLD/BLD COMPNT: CPT

## 2023-11-16 PROCEDURE — 86850 RBC ANTIBODY SCREEN: CPT | Performed by: EMERGENCY MEDICINE

## 2023-11-16 PROCEDURE — 999N000285 HC STATISTIC VASC ACCESS LAB DRAW WITH PIV START

## 2023-11-16 PROCEDURE — 84155 ASSAY OF PROTEIN SERUM: CPT | Performed by: EMERGENCY MEDICINE

## 2023-11-16 PROCEDURE — 36415 COLL VENOUS BLD VENIPUNCTURE: CPT | Performed by: PATHOLOGY

## 2023-11-16 PROCEDURE — 83550 IRON BINDING TEST: CPT | Performed by: PATHOLOGY

## 2023-11-16 PROCEDURE — 82607 VITAMIN B-12: CPT | Performed by: NURSE PRACTITIONER

## 2023-11-16 PROCEDURE — 93005 ELECTROCARDIOGRAM TRACING: CPT | Performed by: EMERGENCY MEDICINE

## 2023-11-16 PROCEDURE — 82962 GLUCOSE BLOOD TEST: CPT

## 2023-11-16 RX ORDER — ROSUVASTATIN CALCIUM 5 MG/1
20 TABLET, COATED ORAL DAILY
Status: DISCONTINUED | OUTPATIENT
Start: 2023-11-17 | End: 2023-11-17 | Stop reason: HOSPADM

## 2023-11-16 RX ORDER — PREDNISOLONE ACETATE 10 MG/ML
1 SUSPENSION/ DROPS OPHTHALMIC 2 TIMES DAILY
Status: DISCONTINUED | OUTPATIENT
Start: 2023-11-16 | End: 2023-11-17 | Stop reason: HOSPADM

## 2023-11-16 RX ORDER — NICOTINE POLACRILEX 4 MG
15-30 LOZENGE BUCCAL
Status: DISCONTINUED | OUTPATIENT
Start: 2023-11-16 | End: 2023-11-17 | Stop reason: HOSPADM

## 2023-11-16 RX ORDER — ACETAMINOPHEN 650 MG/1
650 SUPPOSITORY RECTAL EVERY 4 HOURS PRN
Status: DISCONTINUED | OUTPATIENT
Start: 2023-11-16 | End: 2023-11-17 | Stop reason: HOSPADM

## 2023-11-16 RX ORDER — LIDOCAINE 40 MG/G
CREAM TOPICAL
Status: DISCONTINUED | OUTPATIENT
Start: 2023-11-16 | End: 2023-11-17 | Stop reason: HOSPADM

## 2023-11-16 RX ORDER — MYCOPHENOLATE MOFETIL 500 MG/1
1500 TABLET ORAL 2 TIMES DAILY
Status: DISCONTINUED | OUTPATIENT
Start: 2023-11-16 | End: 2023-11-17 | Stop reason: HOSPADM

## 2023-11-16 RX ORDER — BISACODYL 5 MG
5 TABLET, DELAYED RELEASE (ENTERIC COATED) ORAL DAILY PRN
Status: DISCONTINUED | OUTPATIENT
Start: 2023-11-16 | End: 2023-11-17 | Stop reason: HOSPADM

## 2023-11-16 RX ORDER — CALCIUM CARBONATE/VITAMIN D3 600 MG-10
1 TABLET ORAL 2 TIMES DAILY WITH MEALS
Status: DISCONTINUED | OUTPATIENT
Start: 2023-11-17 | End: 2023-11-17 | Stop reason: HOSPADM

## 2023-11-16 RX ORDER — KETOROLAC TROMETHAMINE 5 MG/ML
1 SOLUTION OPHTHALMIC 2 TIMES DAILY
Status: DISCONTINUED | OUTPATIENT
Start: 2023-11-16 | End: 2023-11-17 | Stop reason: HOSPADM

## 2023-11-16 RX ORDER — ACETAMINOPHEN 325 MG/1
650 TABLET ORAL EVERY 4 HOURS PRN
Status: DISCONTINUED | OUTPATIENT
Start: 2023-11-16 | End: 2023-11-17 | Stop reason: HOSPADM

## 2023-11-16 RX ORDER — ONDANSETRON 2 MG/ML
4 INJECTION INTRAMUSCULAR; INTRAVENOUS EVERY 6 HOURS PRN
Status: DISCONTINUED | OUTPATIENT
Start: 2023-11-16 | End: 2023-11-17 | Stop reason: HOSPADM

## 2023-11-16 RX ORDER — TAMSULOSIN HYDROCHLORIDE 0.4 MG/1
0.4 CAPSULE ORAL DAILY
Status: DISCONTINUED | OUTPATIENT
Start: 2023-11-17 | End: 2023-11-17 | Stop reason: HOSPADM

## 2023-11-16 RX ORDER — LATANOPROST 50 UG/ML
1 SOLUTION/ DROPS OPHTHALMIC AT BEDTIME
Status: DISCONTINUED | OUTPATIENT
Start: 2023-11-16 | End: 2023-11-17 | Stop reason: HOSPADM

## 2023-11-16 RX ORDER — ONDANSETRON 4 MG/1
4 TABLET, ORALLY DISINTEGRATING ORAL EVERY 6 HOURS PRN
Status: DISCONTINUED | OUTPATIENT
Start: 2023-11-16 | End: 2023-11-17 | Stop reason: HOSPADM

## 2023-11-16 RX ORDER — POLYETHYLENE GLYCOL 3350 17 G/17G
17 POWDER, FOR SOLUTION ORAL 2 TIMES DAILY PRN
Status: DISCONTINUED | OUTPATIENT
Start: 2023-11-16 | End: 2023-11-17 | Stop reason: HOSPADM

## 2023-11-16 RX ORDER — ASPIRIN 81 MG/1
81 TABLET, CHEWABLE ORAL DAILY
Status: DISCONTINUED | OUTPATIENT
Start: 2023-11-17 | End: 2023-11-17 | Stop reason: HOSPADM

## 2023-11-16 RX ORDER — DEXTROSE MONOHYDRATE 25 G/50ML
25-50 INJECTION, SOLUTION INTRAVENOUS
Status: DISCONTINUED | OUTPATIENT
Start: 2023-11-16 | End: 2023-11-17 | Stop reason: HOSPADM

## 2023-11-16 RX ORDER — SULFAMETHOXAZOLE AND TRIMETHOPRIM 400; 80 MG/1; MG/1
1 TABLET ORAL
Status: DISCONTINUED | OUTPATIENT
Start: 2023-11-18 | End: 2023-11-17 | Stop reason: HOSPADM

## 2023-11-16 RX ORDER — BISACODYL 5 MG
10 TABLET, DELAYED RELEASE (ENTERIC COATED) ORAL DAILY PRN
Status: DISCONTINUED | OUTPATIENT
Start: 2023-11-16 | End: 2023-11-17 | Stop reason: HOSPADM

## 2023-11-16 ASSESSMENT — ACTIVITIES OF DAILY LIVING (ADL)
ADLS_ACUITY_SCORE: 35

## 2023-11-16 NOTE — ED TRIAGE NOTES
Pt comes into triage for a hemoglobin of 6.4. referred in for that. Pt has needed transfusion in the past for this. Denies bleeding or dark stools. Heart transplant 4 years ago.      Triage Assessment (Adult)       Row Name 11/16/23 6256          Triage Assessment    Airway WDL WDL        Respiratory WDL    Respiratory WDL WDL        Skin Circulation/Temperature WDL    Skin Circulation/Temperature WDL WDL        Cardiac WDL    Cardiac WDL WDL        Peripheral/Neurovascular WDL    Peripheral Neurovascular WDL WDL        Cognitive/Neuro/Behavioral WDL    Cognitive/Neuro/Behavioral WDL WDL

## 2023-11-16 NOTE — TELEPHONE ENCOUNTER
Critical lab value Hgb = 6.4 called.      On 11/8 pt was 6.8.     Anemia team following but he hasn't received a dose of aranesp He was scheduled for 11/22. Team updated.     Arlin Guajardo and Dr. Sheridan also updated.     Pt called via . Pt made aware of Hgb and instructions to go to ER to be evaluated and also receive a blood transfusion. Pt states he can't get there until 1600. Writer encouraged pt to pack an over night bag.     Primary coordinator updated.

## 2023-11-16 NOTE — PROGRESS NOTES
Clinical Product Navigator RN reviewed chart; patient on payer product coverage.  Review results:   CPN Initial Information Gathering  Referral Source: [employer] Outreach    Patient identified via Epic report as at risk patient with no PCP. Per chart review, patient's AWV was with Dr. Casiano at [facility]. Chart updated accordingly. Per chart review, patient follows closely with post-transplant RN Care Coordination. No CPN outreach indicated at this time.    Alta Inman RN CC  Casual Clinical Product Navigator Coverage

## 2023-11-16 NOTE — TELEPHONE ENCOUNTER
Report called to [facility] ER.  Pt coming in with low Hgb and needing a blood transfusion.

## 2023-11-17 ENCOUNTER — DOCUMENTATION ONLY (OUTPATIENT)
Dept: CARE COORDINATION | Facility: CLINIC | Age: 68
End: 2023-11-17
Payer: COMMERCIAL

## 2023-11-17 VITALS
RESPIRATION RATE: 16 BRPM | TEMPERATURE: 97.9 F | DIASTOLIC BLOOD PRESSURE: 68 MMHG | OXYGEN SATURATION: 99 % | SYSTOLIC BLOOD PRESSURE: 150 MMHG | HEART RATE: 80 BPM

## 2023-11-17 LAB
BLD PROD TYP BPU: NORMAL
BLOOD COMPONENT TYPE: NORMAL
CODING SYSTEM: NORMAL
CROSSMATCH: NORMAL
ERYTHROCYTE [DISTWIDTH] IN BLOOD BY AUTOMATED COUNT: 15.8 % (ref 10–15)
GLUCOSE BLDC GLUCOMTR-MCNC: 108 MG/DL (ref 70–99)
GLUCOSE BLDC GLUCOMTR-MCNC: 189 MG/DL (ref 70–99)
GLUCOSE BLDC GLUCOMTR-MCNC: 86 MG/DL (ref 70–99)
GLUCOSE BLDC GLUCOMTR-MCNC: 90 MG/DL (ref 70–99)
GLUCOSE BLDC GLUCOMTR-MCNC: 98 MG/DL (ref 70–99)
HCT VFR BLD AUTO: 22 % (ref 40–53)
HGB BLD-MCNC: 6.9 G/DL (ref 13.3–17.7)
HGB BLD-MCNC: 6.9 G/DL (ref 13.3–17.7)
HGB BLD-MCNC: 8.5 G/DL (ref 13.3–17.7)
HOLD SPECIMEN: NORMAL
ISSUE DATE AND TIME: NORMAL
MCH RBC QN AUTO: 27.9 PG (ref 26.5–33)
MCHC RBC AUTO-ENTMCNC: 31.4 G/DL (ref 31.5–36.5)
MCV RBC AUTO: 89 FL (ref 78–100)
PLATELET # BLD AUTO: 139 10E3/UL (ref 150–450)
RBC # BLD AUTO: 2.47 10E6/UL (ref 4.4–5.9)
UNIT ABO/RH: NORMAL
UNIT NUMBER: NORMAL
UNIT STATUS: NORMAL
UNIT TYPE ISBT: 5100
WBC # BLD AUTO: 3.8 10E3/UL (ref 4–11)

## 2023-11-17 PROCEDURE — 36430 TRANSFUSION BLD/BLD COMPNT: CPT

## 2023-11-17 PROCEDURE — 250N000012 HC RX MED GY IP 250 OP 636 PS 637: Performed by: NURSE PRACTITIONER

## 2023-11-17 PROCEDURE — 99239 HOSP IP/OBS DSCHRG MGMT >30: CPT | Mod: FS | Performed by: INTERNAL MEDICINE

## 2023-11-17 PROCEDURE — 85018 HEMOGLOBIN: CPT

## 2023-11-17 PROCEDURE — P9016 RBC LEUKOCYTES REDUCED: HCPCS

## 2023-11-17 PROCEDURE — 82962 GLUCOSE BLOOD TEST: CPT

## 2023-11-17 PROCEDURE — 250N000009 HC RX 250: Performed by: NURSE PRACTITIONER

## 2023-11-17 PROCEDURE — 99207 PR APP CREDIT; MD BILLING SHARED VISIT: CPT | Mod: FS

## 2023-11-17 PROCEDURE — 85027 COMPLETE CBC AUTOMATED: CPT | Performed by: NURSE PRACTITIONER

## 2023-11-17 PROCEDURE — 36415 COLL VENOUS BLD VENIPUNCTURE: CPT

## 2023-11-17 PROCEDURE — G0378 HOSPITAL OBSERVATION PER HR: HCPCS

## 2023-11-17 PROCEDURE — 36415 COLL VENOUS BLD VENIPUNCTURE: CPT | Performed by: NURSE PRACTITIONER

## 2023-11-17 PROCEDURE — 250N000013 HC RX MED GY IP 250 OP 250 PS 637: Performed by: NURSE PRACTITIONER

## 2023-11-17 RX ADMIN — MYCOPHENOLATE MOFETIL 1500 MG: 500 TABLET, FILM COATED ORAL at 00:18

## 2023-11-17 RX ADMIN — TAMSULOSIN HYDROCHLORIDE 0.4 MG: 0.4 CAPSULE ORAL at 08:17

## 2023-11-17 RX ADMIN — INSULIN HUMAN 12 UNITS: 100 INJECTION, SUSPENSION SUBCUTANEOUS at 00:18

## 2023-11-17 RX ADMIN — PREDNISOLONE ACETATE 1 DROP: 10 SUSPENSION/ DROPS OPHTHALMIC at 08:18

## 2023-11-17 RX ADMIN — ROSUVASTATIN CALCIUM 20 MG: 5 TABLET, FILM COATED ORAL at 08:17

## 2023-11-17 RX ADMIN — ASPIRIN 81 MG CHEWABLE TABLET 81 MG: 81 TABLET CHEWABLE at 08:17

## 2023-11-17 RX ADMIN — CALCIUM CARBONATE 600 MG (1,500 MG)-VITAMIN D3 400 UNIT TABLET 1 TABLET: at 08:17

## 2023-11-17 RX ADMIN — KETOROLAC TROMETHAMINE 1 DROP: 5 SOLUTION OPHTHALMIC at 08:18

## 2023-11-17 RX ADMIN — SIROLIMUS 3 MG: 2 TABLET ORAL at 08:17

## 2023-11-17 RX ADMIN — MYCOPHENOLATE MOFETIL 1500 MG: 500 TABLET, FILM COATED ORAL at 08:18

## 2023-11-17 ASSESSMENT — ACTIVITIES OF DAILY LIVING (ADL)
ADLS_ACUITY_SCORE: 33

## 2023-11-17 NOTE — PLAN OF CARE
Outpatient/Observation goals to be met before discharge home:    /63   Pulse 79   Temp 98.5  F (36.9  C) (Oral)   Resp 16   SpO2 97%     - Hgb stable in the am: not met, labs not yet completed

## 2023-11-17 NOTE — DISCHARGE SUMMARY
[facility]  Hospitalist Discharge Summary      Date of Admission:  11/16/2023  Date of Discharge:  11/17/2023  Discharging Provider: Rosy Garcia PA-C  Discharge Service: Hospitalist Service    Discharge Diagnoses   Anemia, likely nutritional    Clinically Significant Risk Factors     # DMII: A1C = N/A within past 6 months       Follow-ups Needed After Discharge   - Please recheck hemoglobin next week    Discharge Disposition   Discharged to home  Condition at discharge: Stable    Hospital Course   Lucian Oliveira is a 68 year old male with a history of of heart transplant in 2020, DM II, CKD, HIT and recent norovirus illness, who was admitted to [facility] 11/16/2023-11/17/2023 with anemia to 6.4      Anemia, likely nutritional  Initially noted 11/8 in clinic.  Work up notable for normocytic hypochromic anemia, iron studies showing borderline iron deficiency, otherwise unremarkable. Patient reports poor oral intake over the last month in setting of nororvirus. Suspect anemia multifactorial due to poor oral intake (thus low iron intake) and immunosuppressive medications. No evidence of bleeding source or hemolytic anemia. Patient received two units of blood with improvement in hemoglobin to 8.5 as well as symptomatic improvement. Patient remained hemodynamically stable throughout admission. He is stable for discharge home with close outpatient follow-up next week with his PCP for hemoglobin recheck and other providers as indicated.      S/p heart transplant in 2020 PTA siroliumus 3 mg Qday, mycophenolate 1500 BID, bactrim, ASA, rosuvastatin were continued during stay.      DM II PTA NPH and sliding scale continued during hospital stay.     Of note, phone  used during multiple conversations.     Consultations This Hospital Stay   NURSING TO CONSULT FOR VASCULAR ACCESS CARE IP CONSULT    Code Status   Full Code    Time Spent on this Encounter   Rosy FONTAINE  LEROY Garcia PA-C, personally saw the patient today and spent greater than 30 minutes discharging this patient.       Rosy Garcia PA-C  [facility] [address] [address]  Phone: [phone]  Fax: [phone]  ______________________________________________________________________    Physical Exam   Vital Signs: Temp: 97.9  F (36.6  C) Temp src: Oral BP: (!) 150/68 Pulse: 80   Resp: 16 SpO2: 99 % O2 Device: None (Room air)    Weight: 0 lbs 0 oz  General Appearance: Comfortable, nontoxic appearing male seen laying in bed. Wife at bedside.   Eyes: PERRLA.  No conjunctival icterus.  HEENT: Atraumatic.  Respiratory: Breathing comfortably on room air.    Lymph/Hematologic: No bruising on exposed skin.  Skin: No lesions or rashes noted on exposed skin.  Musculoskeletal: Moving all extremities spontaneously.  Neurologic: Cranial nerves II through XII grossly intact.  Psychiatric: Mood appropriate.         Primary Care Physician   Fracisco Casiano    Discharge Orders      Follow Up ([facility])    Follow up with primary care provider, Fracisco Casiano, within 7 days for hospital follow- up and recheck of hemoglobin.     Follow up with GI as previously scheduled    Appointments on [facility] and/or [facility] (with [facility] or [facility] provider or service). Call [phone] if you haven't heard regarding these appointments within 7 days of discharge.     Reason for your hospital stay    Dear Lucian,    You were hospitalized at [facility] with low hemoglobin in the setting of poor oral intake and treated with two units of blood.  Your hemoglobin was likely low because you were not getting the proper nutrients to make the necessary building blocks for hemoglobin in your body. Over your hospitalization your fatigue and low hempglobin improved and today you are ready to be discharged home.  If you continue your iron therapy and continue  to eat well, you should continue to improve but if you develop fever, shortness of breath, light headedness, chest pain or blood in your stool/urine/vomit please seek medical attention.    We are suggesting the following medication changes:  - None   - Please continue taking your iron    Please set up an appointment with:  - PCP: Please see your PCP early next week to recheck your hemoglobin.    Please follow up with:  - GI: Please see GI as you were previously scheduled to do.    It was a pleasure to care for you during your hospital stay.    If you have any questions, please contact your primary care provider.    Take Rosy borrero PA-C  [facility]   Hospitalist Service     Activity    Your activity upon discharge: activity as tolerated     Diet    Follow this diet upon discharge: Orders Placed This Encounter      Low Consistent Carb (45 g CHO per Meal) Diet       Significant Results and Procedures   Most Recent 3 CBC's:  Recent Labs   Lab Test 11/17/23  0556 11/16/23  1914 11/16/23  1022   WBC 3.8* 4.2 3.4*   HGB 6.9*  6.9* 5.8* 6.4*   MCV 89 89 87   * 165 146*       Discharge Medications   Current Discharge Medication List        CONTINUE these medications which have NOT CHANGED    Details   acetaminophen (TYLENOL) 325 MG tablet Take 3 tablets (975 mg) by mouth every 8 hours as needed for mild pain  Qty: 60 tablet, Refills: 3    Associated Diagnoses: Heart transplant, orthotopic, status (H)      Alcohol Swabs PADS Use to swab the area of the injection or sergio as directed   Per insurance coverage  Qty: 100 each, Refills: 0    Associated Diagnoses: Heart transplant, orthotopic, status (H)      aspirin (ASA) 81 MG chewable tablet Take 1 tablet (81 mg) by mouth daily  Qty: 120 tablet, Refills: 0    Associated Diagnoses: Heart transplant, orthotopic, status (H)      blood glucose (NO BRAND SPECIFIED) test strip Use to test blood sugar 4 times daily or as  directed.  Qty: 400 strip, Refills: 3    Associated Diagnoses: Type 2 diabetes mellitus with diabetic nephropathy, with long-term current use of insulin (H)      blood glucose monitoring (ACCU-CHEK FELIPE SMARTVIEW) meter device kit Use to test blood sugar 3-4 times daily, as directed.  Qty: 1 kit, Refills: 0    Associated Diagnoses: Type 2 diabetes mellitus with diabetic nephropathy, with long-term current use of insulin (H)      blood glucose monitoring (SOFTCLIX) lancets 1 each 4 times daily Use to test blood sugars 2 times daily.  Qty: 400 each, Refills: 8    Associated Diagnoses: Type 2 diabetes mellitus with diabetic nephropathy, with long-term current use of insulin (H)      Calcium Carb-Cholecalciferol (CALCIUM CARBONATE-VITAMIN D3) 600-400 MG-UNIT TABS TAKE ONE TABLET BY MOUTH TWICE A DAY WITH MEALS  Qty: 180 tablet, Refills: 3    Associated Diagnoses: Heart transplant, orthotopic, status (H)      Dulaglutide (TRULICITY) 3 MG/0.5ML SOPN Inject 3 mg Subcutaneous once a week  Qty: 2 mL, Refills: 3    Comments: Decreased dose is appropriate please fill  Associated Diagnoses: 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, unspecified whether long term insulin use (H)      ferrous sulfate (FEROSUL) 325 (65 Fe) MG tablet Take 1 tablet (325 mg) by mouth daily (with breakfast)  Qty: 90 tablet, Refills: 3    Associated Diagnoses: Heart replaced by transplant (H)      insulin lispro (HUMALOG KWIKPEN) 100 UNIT/ML (1 unit dial) KWIKPEN Inject 8-14 Units Subcutaneous 3 times daily (before meals)      insulin NPH (HUMULIN N KWIKPEN) 100 UNIT/ML injection GIve 40 units each morning and 12 at night subcutaneous  Qty: 45 mL, Refills: 1    Associated Diagnoses: 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, unspecified whether long term insulin use (H)      insulin pen needle (32G X 4 MM) 32G X 4 MM miscellaneous Use 5-6 pen needles daily or as  directed.  Qty: 450 each, Refills: 3    Associated Diagnoses: Type 2 diabetes mellitus with hyperglycemia, with long-term current use of insulin (H)      ketorolac (ACULAR) 0.5 % ophthalmic solution Place 1 drop into the right eye 2 times daily  Qty: 10 mL, Refills: 3    Associated Diagnoses: Aftercare following surgery of a sense organ      latanoprost (XALATAN) 0.005 % ophthalmic solution Place 1 drop into both eyes at bedtime      magnesium oxide 400 MG tablet Take 1 tablet (400 mg) by mouth 2 times daily  Qty: 180 tablet, Refills: 1    Associated Diagnoses: Heart replaced by transplant (H)      mycophenolate (GENERIC EQUIVALENT) 500 MG tablet Take 3 tablets (1,500 mg) by mouth 2 times daily  Qty: 180 tablet, Refills: 11    Comments: TXP DT 7/19/2020 (Heart) TXP Dischg DT 8/3/2020 DX Heart transplant Z94.1 TX Center [facility] ([place], [place])  Associated Diagnoses: Heart replaced by transplant (H)      omega-3 acid ethyl esters (LOVAZA) 1 g capsule Take 1 capsule (1 g) by mouth daily  Qty: 90 capsule, Refills: 3    Associated Diagnoses: Type 2 diabetes mellitus with hyperglycemia, with long-term current use of insulin (H); Dyslipidemia; 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)      prednisoLONE acetate (PRED FORTE) 1 % ophthalmic suspension Place 1 drop into the right eye 2 times daily  Qty: 10 mL, Refills: 3    Associated Diagnoses: Aftercare following surgery of a sense organ      rosuvastatin (CRESTOR) 20 MG tablet Take 1 tablet (20 mg) by mouth daily  Qty: 90 tablet, Refills: 3    Associated Diagnoses: Heart transplant, orthotopic, status (H)      senna-docusate (SENOKOT-S/PERICOLACE) 8.6-50 MG tablet Take 1 tablet by mouth 2 times daily as needed (hold for loose stools)  Qty: 60 tablet, Refills: 3    Associated Diagnoses: Heart transplant, orthotopic, status (H)      sirolimus (GENERIC EQUIVALENT) 0.5 MG  tablet Take 6 tablets (3 mg) by mouth daily  Qty: 180 tablet, Refills: 11    Comments: TXP DT 7/19/2020 (Heart) TXP Dischg DT 8/3/2020 DX Heart transplant Z94.1 TX Center [facility] ([place], [place]) Dose adjustment  Associated Diagnoses: Heart replaced by transplant (H)      sulfamethoxazole-trimethoprim (BACTRIM) 400-80 MG tablet Take 1 tablet by mouth three times a week  Qty: 36 tablet, Refills: 3    Associated Diagnoses: Heart replaced by transplant (H)      tamsulosin (FLOMAX) 0.4 MG capsule TAKE ONE CAPSULE BY MOUTH ONCE DAILY  Qty: 90 capsule, Refills: 3    Associated Diagnoses: Heart transplant, orthotopic, status (H)           STOP taking these medications       empagliflozin (JARDIANCE) 10 MG TABS tablet Comments:   Reason for Stopping:             Allergies   Allergies   Allergen Reactions    Heparin Heparin Induced Thrombocytopenia     HIT screen sent 7/18/2020. CORRINE confirmed positive

## 2023-11-17 NOTE — ED PROVIDER NOTES
[facility] EMERGENCY DEPARTMENT ([facility])    11/16/23         History     Chief Complaint   Patient presents with    Abnormal Labs     HPI  Lucian Oliveira is a 68 year old male who is an English and [place]yan speaking, heart transplant in 2020, recent admission for diarrheal illness presents to ED after had an outpatient lab draw showed a hemoglobin of 6.4. Patient reports feeling generally weak and tired over the past week or two. No vomiting, dark stools, fevers or abdominal pain. I reviewed lab in [facility] at 10:22 AM today which showed a hemoglobin of 6.4.  This part of the medical record was transcribed by EFREN CHILEL, Medical Scribe, from a dictation done by Geovanny George MD.     Past Medical History  Past Medical History:   Diagnosis Date    CAD (coronary artery disease)     CHF (congestive heart failure) (H)     CKD (chronic kidney disease), stage III (H)     Cortical cataract of both eyes     Diabetes (H)     Hyperlipidemia     Hypertension     Infection due to Streptococcus mitis group 09/23/2020    Ischemic cardiomyopathy     Obesity     CHANTEL (obstructive sleep apnea)     occas cpap    CHANTEL (obstructive sleep apnea)- severe (AHI 30)     Osteoarthritis      Past Surgical History:   Procedure Laterality Date    CATARACT IOL, RT/LT      COLONOSCOPY N/A 8/7/2019    Procedure: COLONOSCOPY, WITH POLYPECTOMY AND BIOPSY;  Surgeon: Chauncey Morataya MD;  Location:  GI    COLONOSCOPY N/A 5/12/2023    Procedure: Colonoscopy;  Surgeon: Mariano Velasquez MD;  Location:  GI    CV ANGIOGRAM CORONARY GRAFT N/A 7/2/2020    Procedure: Angiogram Coronary Graft;  Surgeon: Alex Lantigua MD;  Location:  HEART CARDIAC CATH LAB    CV CORONARY ANGIOGRAM N/A 7/2/2020    Procedure: CV CORONARY ANGIOGRAM;  Surgeon: Alex Lantigua MD;  Location:  HEART CARDIAC CATH LAB    CV CORONARY ANGIOGRAM N/A 7/20/2021    Procedure: CV CORONARY ANGIOGRAM;  Surgeon: Raimundo Hudson  MD Day[place];  Location:  HEART CARDIAC CATH LAB    CV CORONARY ANGIOGRAM N/A 9/12/2022    Procedure: Coronary Angiogram- BIPLANE;  Surgeon: Raimundo Hudson MD;  Location:  HEART CARDIAC CATH LAB    CV CORONARY ANGIOGRAM N/A 7/17/2023    Procedure: Coronary Angiogram;  Surgeon: Alex Lantigua MD;  Location: U HEART CARDIAC CATH LAB    CV HEART BIOPSY N/A 7/27/2020    Procedure: Heart Biopsy;  Surgeon: Mario Burr MD;  Location: U HEART CARDIAC CATH LAB    CV HEART BIOPSY N/A 8/3/2020    Procedure: CV HEART BIOPSY;  Surgeon: Alex Lantigua MD;  Location:  HEART CARDIAC CATH LAB    CV HEART BIOPSY N/A 8/10/2020    Procedure: CV HEART BIOPSY;  Surgeon: Mario Burr MD;  Location: U HEART CARDIAC CATH LAB    CV HEART BIOPSY N/A 8/17/2020    Procedure: CV HEART BIOPSY;  Surgeon: Raimundo Hudson MD;  Location: U HEART CARDIAC CATH LAB    CV HEART BIOPSY N/A 8/31/2020    Procedure: CV HEART BIOPSY;  Surgeon: Moises Santos MD;  Location:  HEART CARDIAC CATH LAB    CV HEART BIOPSY N/A 9/14/2020    Procedure: CV HEART BIOPSY;  Surgeon: Raimundo Hudson MD;  Location:  HEART CARDIAC CATH LAB    CV HEART BIOPSY N/A 9/28/2020    Procedure: CV HEART BIOPSY;  Surgeon: Raimundo Hudson MD;  Location: U HEART CARDIAC CATH LAB    CV HEART BIOPSY N/A 10/12/2020    Procedure: CV HEART BIOPSY;  Surgeon: John Stuart MD;  Location: U HEART CARDIAC CATH LAB    CV HEART BIOPSY N/A 11/10/2020    Procedure: CV HEART BIOPSY;  Surgeon: John Stuart MD;  Location: U HEART CARDIAC CATH LAB    CV HEART BIOPSY N/A 12/7/2020    Procedure: CV HEART BIOPSY;  Surgeon: Raimundo Hudson MD;  Location:  HEART CARDIAC CATH LAB    CV HEART BIOPSY N/A 1/5/2021    Procedure: CV HEART BIOPSY;  Surgeon: Raimundo Hudson MD;  Location:  HEART CARDIAC CATH LAB    CV HEART BIOPSY N/A 7/20/2021    Procedure: CV HEART  BIOPSY;  Surgeon: Raimundo Hudson MD;  Location: U HEART CARDIAC CATH LAB    CV HEART BIOPSY N/A 9/28/2021    Procedure: CV HEART BIOPSY;  Surgeon: Raimundo Hudson MD;  Location: U HEART CARDIAC CATH LAB    CV HEART BIOPSY N/A 9/12/2022    Procedure: Heart Biopsy;  Surgeon: Raimundo Hudson MD;  Location: U HEART CARDIAC CATH LAB    CV HEART BIOPSY N/A 7/17/2023    Procedure: Heart Biopsy;  Surgeon: Alex Lantigua MD;  Location:  HEART CARDIAC CATH LAB    CV INTRA AORTIC BALLOON N/A 7/14/2020    Procedure: RHC WITH LEAVE IN AN/IABP;  Surgeon: Sonny Saleh MD;  Location:  HEART CARDIAC CATH LAB    CV INTRAVASULAR ULTRASOUND N/A 9/12/2022    Procedure: Intravascular Ultrasound;  Surgeon: Raimundo Hudson MD;  Location:  HEART CARDIAC CATH LAB    CV RIGHT HEART CATH MEASUREMENTS RECORDED N/A 3/25/2019    Procedure: CV RIGHT HEART CATH;  Surgeon: Moises Santos MD;  Location:  HEART CARDIAC CATH LAB    CV RIGHT HEART CATH MEASUREMENTS RECORDED N/A 7/10/2019    Procedure: CV RIGHT HEART CATH;  Surgeon: Jak Mccabe MD;  Location:  HEART CARDIAC CATH LAB    CV RIGHT HEART CATH MEASUREMENTS RECORDED N/A 7/8/2020    Procedure: Right Heart Cath with Leave In [place] already has ICU load;  Surgeon: Jak Mccabe MD;  Location:  HEART CARDIAC CATH LAB    CV RIGHT HEART CATH MEASUREMENTS RECORDED N/A 7/14/2020    Procedure: CV RIGHT HEART CATH;  Surgeon: Sonny Saleh MD;  Location:  HEART CARDIAC CATH LAB    CV RIGHT HEART CATH MEASUREMENTS RECORDED N/A 7/2/2020    Procedure: CV RIGHT HEART CATH;  Surgeon: Alex Lantigua MD;  Location:  HEART CARDIAC CATH LAB    CV RIGHT HEART CATH MEASUREMENTS RECORDED N/A 7/27/2020    Procedure: Right Heart Cath;  Surgeon: Mario Burr MD;  Location:  HEART CARDIAC CATH LAB    CV RIGHT HEART CATH MEASUREMENTS RECORDED N/A 8/3/2020    Procedure: Right Heart Cath;   Surgeon: Alex Lantigua MD;  Location:  HEART CARDIAC CATH LAB    CV RIGHT HEART CATH MEASUREMENTS RECORDED N/A 8/10/2020    Procedure: CV RIGHT HEART CATH;  Surgeon: Mario Burr MD;  Location:  HEART CARDIAC CATH LAB    CV RIGHT HEART CATH MEASUREMENTS RECORDED N/A 8/17/2020    Procedure: CV RIGHT HEART CATH;  Surgeon: Raimundo Hudson MD;  Location:  HEART CARDIAC CATH LAB    CV RIGHT HEART CATH MEASUREMENTS RECORDED N/A 8/31/2020    Procedure: CV RIGHT HEART CATH;  Surgeon: Moises Santos MD;  Location:  HEART CARDIAC CATH LAB    CV RIGHT HEART CATH MEASUREMENTS RECORDED N/A 9/14/2020    Procedure: CV RIGHT HEART CATH;  Surgeon: Raimundo Hudson MD;  Location:  HEART CARDIAC CATH LAB    CV RIGHT HEART CATH MEASUREMENTS RECORDED N/A 9/28/2020    Procedure: CV RIGHT HEART CATH;  Surgeon: Raimundo Hudson MD;  Location:  HEART CARDIAC CATH LAB    CV RIGHT HEART CATH MEASUREMENTS RECORDED N/A 10/12/2020    Procedure: CV RIGHT HEART CATH;  Surgeon: John Stuart MD;  Location:  HEART CARDIAC CATH LAB    CV RIGHT HEART CATH MEASUREMENTS RECORDED N/A 11/10/2020    Procedure: CV RIGHT HEART CATH;  Surgeon: John Stuart MD;  Location:  HEART CARDIAC CATH LAB    CV RIGHT HEART CATH MEASUREMENTS RECORDED N/A 12/7/2020    Procedure: CV RIGHT HEART CATH;  Surgeon: Raimundo Hudson MD;  Location:  HEART CARDIAC CATH LAB    CV RIGHT HEART CATH MEASUREMENTS RECORDED N/A 1/5/2021    Procedure: CV RIGHT HEART CATH;  Surgeon: Raimundo Hudson MD;  Location:  HEART CARDIAC CATH LAB    CV RIGHT HEART CATH MEASUREMENTS RECORDED N/A 7/20/2021    Procedure: CV RIGHT HEART CATH;  Surgeon: Raimundo Hudson MD;  Location:  HEART CARDIAC CATH LAB    CV RIGHT HEART CATH MEASUREMENTS RECORDED N/A 9/28/2021    Procedure: CV RIGHT HEART CATH;  Surgeon: Raimundo Hudson MD;  Location: U HEART CARDIAC CATH  LAB    CV RIGHT HEART CATH MEASUREMENTS RECORDED N/A 9/12/2022    Procedure: Right Heart Catheterization;  Surgeon: Raimundo Hudson MD;  Location:  HEART CARDIAC CATH LAB    CV RIGHT HEART CATH MEASUREMENTS RECORDED N/A 7/17/2023    Procedure: Right Heart Catheterization;  Surgeon: Alex Lantigua MD;  Location: [facility] HEART CARDIAC CATH LAB    EXTRACTION(S) DENTAL Left 7/13/2020    Procedure: EXTRACTION, TOOTH #11, 12, 13, 15, and 29;  Surgeon: Monica Chao DDS;  Location: [facility] OR    IMPLANT AUTOMATIC IMPLANTABLE CARDIOVERTER DEFIBRILLATOR      INCISION AND DRAINAGE STERNUM W/ IRRIGATION SYSTEM, COMBINED N/A 9/23/2020    Procedure: INCISION AND DRAINAGE, LEFT SUPRACLAVICULAR WOUND INFECTION AND ABDOMENN WOUND.;  Surgeon: Ran Huertas MD;  Location: [facility] OR    INSERT INTRAAORTIC BALLOON PUMP N/A 7/16/2020    Procedure: Subclavian Intra-Aortic Balloon Pump Placement;  Surgeon: Allan Sparrow MD;  Location: [facility] OR    IRRIGATION AND DEBRIDEMENT CHEST WASHOUT, COMBINED N/A 9/28/2020    Procedure: IRRIGATION AND DEBRIDEMENT OF LEFT UPPER CHEST WOUND.;  Surgeon: Ran Huertas MD;  [facility]: [facility]    PHACOEMULSIFICATION CLEAR CORNEA WITH STANDARD INTRAOCULAR LENS IMPLANT Right 2/6/2023    Procedure: RIGHT EYE PHACOEMULSIFICATION, CATARACT, WITH INTRAOCULAR LENS IMPLANT with trypan blue;  Surgeon: Triny Bunch MD;  Location: [facility] OR    PHACOEMULSIFICATION CLEAR CORNEA WITH STANDARD IOL, VITRECTOMY PARSPLANA 25 GAGUE, COMBINED Left 12/10/2019    Procedure: PHACOEMULSIFICATION, CATARACT, CLEAR CORNEAL INCISION APPROACH, W STD INTRAOCULAR LENS IMPLANT INSERT + VITRECTOMY BY PARS PLANA  USING 25-GAUGE INSTRUMENTS. ENDOLASER, INFUSION OF 20% SF6 GAS;  Surgeon: Triny Bunch MD;  Location: [facility] OR    PICC DOUBLE LUMEN PLACEMENT Left 07/28/2020    5Fr - 42cm, Basilic vein, mid SVC    PICC INSERTION Right 07/11/2020    basilic 44 cm total     TRANSPLANT HEART RECIPIENT N/A 7/19/2020     Procedure: REDO MEDIAN STERNOTOMY, TRANSPLANT, ORTHOTOPIC HEART, RECIPIENT, ON PUMP OXYGENATOR, REMOVAL OF CARDIAC DEFIBRILLATOR AND LEAD;  Surgeon: Griselli, Massimo, MD;  Location: [facility] OR    VITRECTOMY, PARS PLANA APPROACH, USING 27-GAUGE INSTRUMENTS Left 12/21/2020    Procedure: 27 gauge pars plana vitectomy, membrane peel, perfluoroctane liquid (PFO), retinectomy, endolaser, Silicone Oil 1000 cs;  Surgeon: Triny Bunch MD;  Location:  OR    [facility] CABG, ARTERY-VEIN, THREE  02/2008     acetaminophen (TYLENOL) 325 MG tablet  Alcohol Swabs PADS  aspirin (ASA) 81 MG chewable tablet  blood glucose (NO BRAND SPECIFIED) test strip  blood glucose monitoring (ACCU-CHEK FELIPE SMARTVIEW) meter device kit  blood glucose monitoring (SOFTCLIX) lancets  Calcium Carb-Cholecalciferol (CALCIUM CARBONATE-VITAMIN D3) 600-400 MG-UNIT TABS  Dulaglutide (TRULICITY) 3 MG/0.5ML SOPN  empagliflozin (JARDIANCE) 10 MG TABS tablet  ferrous sulfate (FEROSUL) 325 (65 Fe) MG tablet  insulin lispro (HUMALOG KWIKPEN) 100 UNIT/ML (1 unit dial) KWIKPEN  insulin NPH (HUMULIN N KWIKPEN) 100 UNIT/ML injection  insulin pen needle (32G X 4 MM) 32G X 4 MM miscellaneous  ketorolac (ACULAR) 0.5 % ophthalmic solution  latanoprost (XALATAN) 0.005 % ophthalmic solution  magnesium oxide 400 MG tablet  mycophenolate (GENERIC EQUIVALENT) 500 MG tablet  omega-3 acid ethyl esters (LOVAZA) 1 g capsule  prednisoLONE acetate (PRED FORTE) 1 % ophthalmic suspension  rosuvastatin (CRESTOR) 20 MG tablet  senna-docusate (SENOKOT-S/PERICOLACE) 8.6-50 MG tablet  sirolimus (GENERIC EQUIVALENT) 0.5 MG tablet  sulfamethoxazole-trimethoprim (BACTRIM) 400-80 MG tablet  tamsulosin (FLOMAX) 0.4 MG capsule      Allergies   Allergen Reactions    Heparin Heparin Induced Thrombocytopenia     HIT screen sent 7/18/2020. CORRINE confirmed positive     Family History  Family History   Problem Relation Age of Onset    Diabetes Sister     Diabetes Sister     Diabetes Brother      Macular Degeneration No family hx of     Glaucoma No family hx of     Myocardial Infarction No family hx of     Kidney Disease No family hx of     Anesthesia Reaction No family hx of     Bleeding Disorder No family hx of     Venous thrombosis No family hx of      Social History   Social History     Tobacco Use    Smoking status: Never    Smokeless tobacco: Never    Tobacco comments:     Never smoked; non-smoking household   Vaping Use    Vaping Use: Never used   Substance Use Topics    Alcohol use: No     Alcohol/week: 0.0 standard drinks of alcohol    Drug use: No      Past medical history, past surgical history, medications, allergies, family history, and social history were reviewed with the patient. No additional pertinent items.      A complete review of systems was performed with pertinent positives and negatives noted in the HPI, and all other systems negative.    Physical Exam   BP: 136/65  Pulse: 92  Temp: 98.3  F (36.8  C)  Resp: 17  SpO2: 98 %  Physical Exam  Constitutional:       Comments: Patient is well-appearing, ambulatory, appears tired but alert and awake, rectal exam deferred given patient's denial of darkened stools.           ED Course, Procedures, & Data      Procedures            EKG Interpretation:      Interpreted by Geovanny George MD  Time reviewed: 1850  Symptoms at time of EKG: weakness   Rhythm: normal sinus   Rate: Normal  Axis: Normal  Conduction: 1st degree AV block  ST Segments/ T Waves: Non-specific ST-T wave changes    Clinical Impression: normal EKG                 Results for orders placed or performed during the hospital encounter of 11/16/23   Comprehensive metabolic panel     Status: Abnormal   Result Value Ref Range    Sodium 139 135 - 145 mmol/L    Potassium 4.1 3.4 - 5.3 mmol/L    Carbon Dioxide (CO2) 22 22 - 29 mmol/L    Anion Gap 12 7 - 15 mmol/L    Urea Nitrogen 16.7 8.0 - 23.0 mg/dL    Creatinine 1.88 (H) 0.67 - 1.17 mg/dL    GFR Estimate 38 (L) >60 mL/min/1.73m2     Calcium 8.5 (L) 8.8 - 10.2 mg/dL    Chloride 105 98 - 107 mmol/L    Glucose 239 (H) 70 - 99 mg/dL    Alkaline Phosphatase 129 40 - 150 U/L    AST      ALT      Protein Total 6.0 (L) 6.4 - 8.3 g/dL    Albumin 3.6 3.5 - 5.2 g/dL    Bilirubin Total 0.2 <=1.2 mg/dL   CBC with platelets and differential     Status: Abnormal   Result Value Ref Range    WBC Count 4.2 4.0 - 11.0 10e3/uL    RBC Count 2.08 (L) 4.40 - 5.90 10e6/uL    Hemoglobin 5.8 (LL) 13.3 - 17.7 g/dL    Hematocrit 18.5 (L) 40.0 - 53.0 %    MCV 89 78 - 100 fL    MCH 27.9 26.5 - 33.0 pg    MCHC 31.4 (L) 31.5 - 36.5 g/dL    RDW 16.9 (H) 10.0 - 15.0 %    Platelet Count 165 150 - 450 10e3/uL    % Neutrophils 70 %    % Lymphocytes 19 %    % Monocytes 9 %    % Eosinophils 1 %    % Basophils 0 %    % Immature Granulocytes 1 %    NRBCs per 100 WBC 0 <1 /100    Absolute Neutrophils 3.0 1.6 - 8.3 10e3/uL    Absolute Lymphocytes 0.8 0.8 - 5.3 10e3/uL    Absolute Monocytes 0.4 0.0 - 1.3 10e3/uL    Absolute Eosinophils 0.1 0.0 - 0.7 10e3/uL    Absolute Basophils 0.0 0.0 - 0.2 10e3/uL    Absolute Immature Granulocytes 0.0 <=0.4 10e3/uL    Absolute NRBCs 0.0 10e3/uL   EKG 12-lead, tracing only     Status: None   Result Value Ref Range    Systolic Blood Pressure  mmHg    Diastolic Blood Pressure  mmHg    Ventricular Rate 91 BPM    Atrial Rate 91 BPM    VA Interval 106 ms    QRS Duration 86 ms     ms    QTc 432 ms    P Axis 52 degrees    R AXIS -21 degrees    T Axis 8 degrees    Interpretation ECG       Sinus rhythm with short VA  Inferior infarct , age undetermined  Cannot rule out Anterior infarct , age undetermined  Abnormal ECG  Unconfirmed report - interpretation of this ECG is computer generated - see medical record for final interpretation  Confirmed by - EMERGENCY ROOM, PHYSICIAN ([id number]),  KAYLI SNYDER ([id number]) on 11/16/2023 7:34:39 PM     Adult Type and Screen     Status: None (Preliminary result)   Result Value Ref Range    ABO/RH(D) O POS     SPECIMEN  EXPIRATION DATE [id number]    ABO/Rh type and screen     Status: None (In process)    Narrative    The following orders were created for panel order ABO/Rh type and screen.  Procedure                               Abnormality         Status                     ---------                               -----------         ------                     Adult Type and Screen[[id number]]                            Preliminary result           Please view results for these tests on the individual orders.   CBC with platelets differential     Status: Abnormal    Narrative    The following orders were created for panel order CBC with platelets differential.  Procedure                               Abnormality         Status                     ---------                               -----------         ------                     CBC with platelets and d...[[id number]]  Abnormal            Final result                 Please view results for these tests on the individual orders.   Results for orders placed or performed in visit on 11/16/23   Iron & Iron Binding Capacity     Status: Abnormal   Result Value Ref Range    Iron 55 (L) 61 - 157 ug/dL    Iron Binding Capacity 205 (L) 240 - 430 ug/dL    Iron Sat Index 27 15 - 46 %   Ferritin     Status: Normal   Result Value Ref Range    Ferritin 334 31 - 409 ng/mL   Folate     Status: Normal   Result Value Ref Range    Folic Acid 10.2 4.6 - 34.8 ng/mL   Vitamin B12     Status: Normal   Result Value Ref Range    Vitamin B12 317 232 - 1,245 pg/mL   CBC with platelets and differential     Status: Abnormal   Result Value Ref Range    WBC Count 3.4 (L) 4.0 - 11.0 10e3/uL    RBC Count 2.26 (L) 4.40 - 5.90 10e6/uL    Hemoglobin 6.4 (LL) 13.3 - 17.7 g/dL    Hematocrit 19.6 (L) 40.0 - 53.0 %    MCV 87 78 - 100 fL    MCH 28.3 26.5 - 33.0 pg    MCHC 32.7 31.5 - 36.5 g/dL    RDW 16.4 (H) 10.0 - 15.0 %    Platelet Count 146 (L) 150 - 450 10e3/uL    % Neutrophils 63 %    % Lymphocytes 27 %    %  Monocytes 8 %    % Eosinophils 1 %    % Basophils 0 %    % Immature Granulocytes 1 %    NRBCs per 100 WBC 0 <1 /100    Absolute Neutrophils 2.2 1.6 - 8.3 10e3/uL    Absolute Lymphocytes 0.9 0.8 - 5.3 10e3/uL    Absolute Monocytes 0.3 0.0 - 1.3 10e3/uL    Absolute Eosinophils 0.0 0.0 - 0.7 10e3/uL    Absolute Basophils 0.0 0.0 - 0.2 10e3/uL    Absolute Immature Granulocytes 0.0 <=0.4 10e3/uL    Absolute NRBCs 0.0 10e3/uL   CBC with Platelets & Differential     Status: Abnormal    Narrative    The following orders were created for panel order CBC with Platelets & Differential.  Procedure                               Abnormality         Status                     ---------                               -----------         ------                     CBC with platelets and d...[[id number]]  Abnormal            Final result                 Please view results for these tests on the individual orders.     Medications - No data to display  Labs Ordered and Resulted from Time of ED Arrival to Time of ED Departure   COMPREHENSIVE METABOLIC PANEL - Abnormal       Result Value    Sodium 139      Potassium 4.1      Carbon Dioxide (CO2) 22      Anion Gap 12      Urea Nitrogen 16.7      Creatinine 1.88 (*)     GFR Estimate 38 (*)     Calcium 8.5 (*)     Chloride 105      Glucose 239 (*)     Alkaline Phosphatase 129      AST        ALT        Protein Total 6.0 (*)     Albumin 3.6      Bilirubin Total 0.2     CBC WITH PLATELETS AND DIFFERENTIAL - Abnormal    WBC Count 4.2      RBC Count 2.08 (*)     Hemoglobin 5.8 (*)     Hematocrit 18.5 (*)     MCV 89      MCH 27.9      MCHC 31.4 (*)     RDW 16.9 (*)     Platelet Count 165      % Neutrophils 70      % Lymphocytes 19      % Monocytes 9      % Eosinophils 1      % Basophils 0      % Immature Granulocytes 1      NRBCs per 100 WBC 0      Absolute Neutrophils 3.0      Absolute Lymphocytes 0.8      Absolute Monocytes 0.4      Absolute Eosinophils 0.1      Absolute Basophils 0.0       Absolute Immature Granulocytes 0.0      Absolute NRBCs 0.0     TYPE AND SCREEN, ADULT    ABO/RH(D) O POS      SPECIMEN EXPIRATION DATE [id number]     PREPARE RED BLOOD CELLS (UNIT)   TRANSFUSE RED BLOOD CELLS (UNIT)   ABO/RH TYPE AND SCREEN     No orders to display          Critical care was not performed.     Medical Decision Making  The patient's presentation was of high complexity (a chronic illness severe exacerbation, progression, or side effect of treatment).    The patient's evaluation involved:  ordering and/or review of 3+ test(s) in this encounter (see separate area of note for details)    The patient's management necessitated high risk (a decision regarding hospitalization).    Assessment & Plan    After review of patient's hemoglobin over the past month, patient has baseline of 8 to 8.5 around 3 weeks to 4 weeks ago and is slowly drifted down today with a 6.4 hemoglobin, with weakness but no pain or dark stools. We will reconfirm anemia, transfuse 1 unit of packed red blood cells and likely admit for monitoring of weakness versus discharge to rapid outpatient follow-up after my initial assessment.    8pm: consented in Indonesian for transfusion, given general weakness and potential need for multiple transfusion rbcs and complex medical history.  I discussed the case with cardiology #2 staff attending who agrees with medicine admission given no cardiopulmonary symptoms or signs.      I discussed the case with medical triage hospitalist who will admit the patient to medicine observation unit    I have reviewed the nursing notes. I have reviewed the findings, diagnosis, plan and need for follow up with the patient.    New Prescriptions    No medications on file       Final diagnoses:   Anemia, unspecified type   Weakness generalized   Heart replaced by transplant (H)   EFREN FONTAINE, am serving as a trained medical scribe to document services personally performed by Geovanny George MD, based on the  provider's statements to me.     I, Geovanny George MD, was physically present and have reviewed and verified the accuracy of this note documented by EFREN CHILEL.     Geovanny George MD.     [facility] EMERGENCY DEPARTMENT  11/16/2023     Geovanny George MD  11/16/23 2030       Geovanny George MD  11/16/23 2100       Geovanny George MD  11/16/23 2100

## 2023-11-17 NOTE — DISCHARGE SUMMARY
Discharge instructions reviewed, understood by patient. VSS, PIV removed, new medications reviewed and understood, patient has all belongings.

## 2023-11-17 NOTE — PLAN OF CARE
Outpatient/Observation goals to be met before discharge home:    /66   Pulse 84   Temp 98.4  F (36.9  C) (Oral)   Resp 17   SpO2 97%     -Hgb stable in the am: not met, labs not yet completed, hgb lab scheduled for 5:15am

## 2023-11-17 NOTE — H&P
[employer]    History and Physical - Hospitalist Service, GOLD TEAM        Date of Admission:  11/16/2023    Assessment & Plan      Lucian Oliveira is a 68 year old male admitted on 11/16/2023. He has a history of heart transplant in 2020, DM II, CKD, HIT and recent norovirus illness who is admitted after he was found to be anemic in clinic with a hemoglobin of 6.4.    1) Anemia - Noted 11/8 in clinic.  Work up notable for normocytic hypochromic anemia, iron studies showing borderline iron deficiency, normal B12, normal folate, normal bilirubin and a normal reticulocyte count.  Main concerns at present would be for anemia due to immunosuppressive medications.  - Will add on LDH, haptoglobin but doubt hemolytic anemia  - No signs of GI bleeding and no major risk factors  - No recent change to transplant medications that I can see, and drug levels appear fairly consistent.  Would recommend follow up with his transplant team.  - Will give one unit PRBCs tonight and if stable in the am, discharge for follow up with cardiology and possibly GI.  - On bactrim, but this is not a new medication for him  - Already on iron supplements    2) History of heart transplant in 2020  - Continue home siroliumus 3 mg Qday, mycophenolate 1500 BID  - Continue home bactrim  - Continue home ASA, rosuvastatin    3) History of DM II  - Continue home NPH 40 units qam, 12 units QHS  - Continue sliding scale insulin        Diet:  Regular  DVT Prophylaxis: Pneumatic Compression Devices  Calderon Catheter: Not present  Lines: None     Cardiac Monitoring: None  Code Status:  Full    Clinically Significant Risk Factors Present on Admission                # Drug Induced Platelet Defect: home medication list includes an antiplatelet medication   # Hypertension: Noted on problem list  # Chronic heart failure with preserved ejection fraction: heart failure noted on problem list and last echo with EF >50%     # DMII: A1C = N/A within past 6 months               Disposition Plan      Expected Discharge Date: 11/17/2023                The patient's care was discussed with the Attending Physician, Dr. Mc .    ELIJAH Shin [facility]  Hospitalist Service, [facility]  Securely message with GOkey (more info)  Text page via [facility] Paging/Directory   See signed in provider for up to date coverage information    ______________________________________________________________________    Chief Complaint   Referred in from cardiology    History is obtained from the patient    History of Present Illness   Lucian Oliveira is a 68 year old male admitted on 11/16/2023. He has a history of heart transplant in 2020, DM II, CKD, HIT and recent norovirus illness who is admitted after he was found to be anemic in clinic with a hemoglobin of 6.4.    The patient tells me he has been feeling more tired over the past week, but denies any other symptoms.  He is on aspirin, but otherwise has not been using NSAIDs.  He denies any black stools, tarry stools, bright red blood per rectum, trauma or nosebleeds.  He denies any prior history of GI bleeding.  He is not on anticoagulants.      Past Medical History    Past Medical History:   Diagnosis Date    CAD (coronary artery disease)     CHF (congestive heart failure) (H)     CKD (chronic kidney disease), stage III (H)     Cortical cataract of both eyes     Diabetes (H)     Hyperlipidemia     Hypertension     Infection due to Streptococcus mitis group 09/23/2020    Ischemic cardiomyopathy     Obesity     CHANTEL (obstructive sleep apnea)     occas cpap    CHANTEL (obstructive sleep apnea)- severe (AHI 30)     Osteoarthritis        Past Surgical History   Past Surgical History:   Procedure Laterality Date    CATARACT IOL, RT/LT      COLONOSCOPY N/A 8/7/2019    Procedure: COLONOSCOPY, WITH POLYPECTOMY AND BIOPSY;  Surgeon: Hood  Chauncey Gonzalez MD;  Location:  GI    COLONOSCOPY N/A 5/12/2023    Procedure: Colonoscopy;  Surgeon: Mariano Velasquez MD;  [facility]: [facility]    CV ANGIOGRAM CORONARY GRAFT N/A 7/2/2020    Procedure: Angiogram Coronary Graft;  Surgeon: Alex Lantigua MD;  Location:  HEART CARDIAC CATH LAB    CV CORONARY ANGIOGRAM N/A 7/2/2020    Procedure: CV CORONARY ANGIOGRAM;  Surgeon: Alex Lantigua MD;  Location:  HEART CARDIAC CATH LAB    CV CORONARY ANGIOGRAM N/A 7/20/2021    Procedure: CV CORONARY ANGIOGRAM;  Surgeon: Raimundo Hudson MD;  Location:  HEART CARDIAC CATH LAB    CV CORONARY ANGIOGRAM N/A 9/12/2022    Procedure: Coronary Angiogram- BIPLANE;  Surgeon: Raimundo Hudson MD;  Location:  HEART CARDIAC CATH LAB    CV CORONARY ANGIOGRAM N/A 7/17/2023    Procedure: Coronary Angiogram;  Surgeon: Alex Lantigua MD;  Location:  HEART CARDIAC CATH LAB    CV HEART BIOPSY N/A 7/27/2020    Procedure: Heart Biopsy;  Surgeon: Mario Burr MD;  Location:  HEART CARDIAC CATH LAB    CV HEART BIOPSY N/A 8/3/2020    Procedure: CV HEART BIOPSY;  Surgeon: Alex Lantigua MD;  Location:  HEART CARDIAC CATH LAB    CV HEART BIOPSY N/A 8/10/2020    Procedure: CV HEART BIOPSY;  Surgeon: Mario Burr MD;  Location:  HEART CARDIAC CATH LAB    CV HEART BIOPSY N/A 8/17/2020    Procedure: CV HEART BIOPSY;  Surgeon: Raimundo Hudson MD;  Location:  HEART CARDIAC CATH LAB    CV HEART BIOPSY N/A 8/31/2020    Procedure: CV HEART BIOPSY;  Surgeon: Moises Santos MD;  Location:  HEART CARDIAC CATH LAB    CV HEART BIOPSY N/A 9/14/2020    Procedure: CV HEART BIOPSY;  Surgeon: Raimundo Hudson MD;  Location:  HEART CARDIAC CATH LAB    CV HEART BIOPSY N/A 9/28/2020    Procedure: CV HEART BIOPSY;  Surgeon: Raimundo Hudson MD;  Location:  HEART CARDIAC CATH LAB    CV HEART BIOPSY N/A 10/12/2020    Procedure: CV  HEART BIOPSY;  Surgeon: John Stuart MD;  Location: [facility] HEART CARDIAC CATH LAB    CV HEART BIOPSY N/A 11/10/2020    Procedure: CV HEART BIOPSY;  Surgeon: John Stuart MD;  Location: [facility] HEART CARDIAC CATH LAB    CV HEART BIOPSY N/A 12/7/2020    Procedure: CV HEART BIOPSY;  Surgeon: Raimundo Hudson MD;  Location:  HEART CARDIAC CATH LAB    CV HEART BIOPSY N/A 1/5/2021    Procedure: CV HEART BIOPSY;  Surgeon: Raimundo Hudson MD;  Location:  HEART CARDIAC CATH LAB    CV HEART BIOPSY N/A 7/20/2021    Procedure: CV HEART BIOPSY;  Surgeon: Raimundo Hudson MD;  Location:  HEART CARDIAC CATH LAB    CV HEART BIOPSY N/A 9/28/2021    Procedure: CV HEART BIOPSY;  Surgeon: Raimundo Hudson MD;  Location:  HEART CARDIAC CATH LAB    CV HEART BIOPSY N/A 9/12/2022    Procedure: Heart Biopsy;  Surgeon: Raimundo Hudson MD;  Location:  HEART CARDIAC CATH LAB    CV HEART BIOPSY N/A 7/17/2023    Procedure: Heart Biopsy;  Surgeon: Alex Lantigua MD;  Location:  HEART CARDIAC CATH LAB    CV INTRA AORTIC BALLOON N/A 7/14/2020    Procedure: RHC WITH LEAVE IN SWAN/IABP;  Surgeon: Sonny Saelh MD;  Location:  HEART CARDIAC CATH LAB    CV INTRAVASULAR ULTRASOUND N/A 9/12/2022    Procedure: Intravascular Ultrasound;  Surgeon: Raimundo Hudson MD;  Location:  HEART CARDIAC CATH LAB    CV RIGHT HEART CATH MEASUREMENTS RECORDED N/A 3/25/2019    Procedure: CV RIGHT HEART CATH;  Surgeon: Moises Santos MD;  Location:  HEART CARDIAC CATH LAB    CV RIGHT HEART CATH MEASUREMENTS RECORDED N/A 7/10/2019    Procedure: CV RIGHT HEART CATH;  Surgeon: Jak Mccabe MD;  Location:  HEART CARDIAC CATH LAB    CV RIGHT HEART CATH MEASUREMENTS RECORDED N/A 7/8/2020    Procedure: Right Heart Cath with Leave In swan already has ICU load;  Surgeon: Jak Mccabe MD;  [facility]: [facility]    CV RIGHT HEART CATH  MEASUREMENTS RECORDED N/A 7/14/2020    Procedure: CV RIGHT HEART CATH;  Surgeon: Sonny Saleh MD;  Location: [facility] CARDIAC CATH LAB    CV RIGHT HEART CATH MEASUREMENTS RECORDED N/A 7/2/2020    Procedure: CV RIGHT HEART CATH;  Surgeon: Alex Lantigua MD;  Location: [facility] HEART CARDIAC CATH LAB    CV RIGHT HEART CATH MEASUREMENTS RECORDED N/A 7/27/2020    Procedure: Right Heart Cath;  Surgeon: Mario Burr MD;  Location: [facility] HEART CARDIAC CATH LAB    CV RIGHT HEART CATH MEASUREMENTS RECORDED N/A 8/3/2020    Procedure: Right Heart Cath;  Surgeon: Alex Lantigua MD;  Location:  HEART CARDIAC CATH LAB    CV RIGHT HEART CATH MEASUREMENTS RECORDED N/A 8/10/2020    Procedure: CV RIGHT HEART CATH;  Surgeon: Mario Burr MD;  Location:  HEART CARDIAC CATH LAB    CV RIGHT HEART CATH MEASUREMENTS RECORDED N/A 8/17/2020    Procedure: CV RIGHT HEART CATH;  Surgeon: Raimundo Hudson MD;  Location: [facility]ARD[facility] CATH LAB    CV RIGHT HEART CATH MEASUREMENTS RECORDED N/A 8/31/2020    Procedure: CV RIGHT HEART CATH;  Surgeon: Moises Santos MD;  Location:  HEART CARDIAC CATH LAB    CV RIGHT HEART CATH MEASUREMENTS RECORDED N/A 9/14/2020    Procedure: CV RIGHT HEART CATH;  Surgeon: Raimundo Hudson MD;  Location:  HEART CARDIAC CATH LAB    CV RIGHT HEART CATH MEASUREMENTS RECORDED N/A 9/28/2020    Procedure: CV RIGHT HEART CATH;  Surgeon: Raimundo Hudson MD;  Location: [facility]ARD[facility] CATH LAB    CV RIGHT HEART CATH MEASUREMENTS RECORDED N/A 10/12/2020    Procedure: CV RIGHT HEART CATH;  Surgeon: John Stuart MD;  Location: [facility] HE[facility] CARDIAC CATH LAB    CV RIGHT HEART CATH MEASUREMENTS RECORDED N/A 11/10/2020    Procedure: CV RIGHT HEART CATH;  Surgeon: John Stuart MD;  Location:  HEART CARDIAC CATH LAB    CV RIGHT HEART CATH MEASUREMENTS RECORDED N/A 12/7/2020    Procedure: CV RIGHT HEART CATH;  Surgeon: Raimundo Hudson  MD;  Location:  HEART CARDIAC CATH LAB    CV RIGHT HEART CATH MEASUREMENTS RECORDED N/A 1/5/2021    Procedure: CV RIGHT HEART CATH;  Surgeon: Raimundo Hudson MD;  Location:  HEART CARDIAC CATH LAB    CV RIGHT HEART CATH MEASUREMENTS RECORDED N/A 7/20/2021    Procedure: CV RIGHT HEART CATH;  Surgeon: Raimundo Hudson MD;  Location:  HEART CARDIAC CATH LAB    CV RIGHT HEART CATH MEASUREMENTS RECORDED N/A 9/28/2021    Procedure: CV RIGHT HEART CATH;  Surgeon: Raimundo Hudson MD;  Location:  HEART CARDIAC CATH LAB    CV RIGHT HEART CATH MEASUREMENTS RECORDED N/A 9/12/2022    Procedure: Right Heart Catheterization;  Surgeon: Raimundo Hudson MD;  Location:  HEART CARDIAC CATH LAB    CV RIGHT HEART CATH MEASUREMENTS RECORDED N/A 7/17/2023    Procedure: Right Heart Catheterization;  Surgeon: Alex Lantigua MD;  Location:  HEART CARDIAC CATH LAB    EXTRACTION(S) DENTAL Left 7/13/2020    Procedure: EXTRACTION, TOOTH #11, 12, 13, 15, and 29;  Surgeon: Monica Chao DDS;  Location:  OR    IMPLANT AUTOMATIC IMPLANTABLE CARDIOVERTER DEFIBRILLATOR      INCISION AND DRAINAGE STERNUM W/ IRRIGATION SYSTEM, COMBINED N/A 9/23/2020    Procedure: INCISION AND DRAINAGE, LEFT SUPRACLAVICULAR WOUND INFECTION AND ABDOMENN WOUND.;  Surgeon: Ran Huertas MD;  [facility]    INSERT INTRAAORTIC BALLOON PUMP N/A 7/16/2020    Procedure: Subclavian Intra-Aortic Balloon Pump Placement;  Surgeon: Allan Sparrow MD;  [facility]    IRRIGATION AND DEBRIDEMENT CHEST WASHOUT, COMBINED N/A 9/28/2020    Procedure: IRRIGATION AND DEBRIDEMENT OF LEFT UPPER CHEST WOUND.;  Surgeon: Ran Huertas MD;  [facility]    PHACOEMULSIFICATION CLEAR CORNEA WITH STANDARD INTRAOCULAR LENS IMPLANT Right 2/6/2023    Procedure: RIGHT EYE PHACOEMULSIFICATION, CATARACT, WITH INTRAOCULAR LENS IMPLANT with trypan blue;  Surgeon: Triny Bunch MD;  Location:  OR     PHACOEMULSIFICATION CLEAR CORNEA WITH STANDARD IOL, VITRECTOMY PARSPLANA 25 GAGUE, COMBINED Left 12/10/2019    Procedure: PHACOEMULSIFICATION, CATARACT, CLEAR CORNEAL INCISION APPROACH, W STD INTRAOCULAR LENS IMPLANT INSERT + VITRECTOMY BY PARS PLANA  USING 25-GAUGE INSTRUMENTS. ENDOLASER, INFUSION OF 20% SF6 GAS;  Surgeon: Triny Bunch MD;  [facility]    PICC DOUBLE LUMEN PLACEMENT Left 07/28/2020    5Fr - 42cm, Basilic vein, mid SVC    PICC INSERTION Right 07/11/2020    basilic 44 cm total     TRANSPLANT HEART RECIPIENT N/A 7/19/2020    Procedure: REDO MEDIAN STERNOTOMY, TRANSPLANT, ORTHOTOPIC HEART, RECIPIENT, ON PUMP OXYGENATOR, REMOVAL OF CARDIAC DEFIBRILLATOR AND LEAD;  Surgeon: Griselli, Massimo, MD;  Location: [facility]    VITRECTOMY, PARS PLANA APPROACH, USING 27-GAUGE INSTRUMENTS Left 12/21/2020    Procedure: 27 gauge pars plana vitectomy, membrane peel, perfluoroctane liquid (PFO), retinectomy, endolaser, Silicone Oil 1000 cs;  Surgeon: Triny Bunch MD;  Location:  OR    [facility] CABG, ARTERY-VEIN, THREE  02/2008       Prior to Admission Medications   Prior to Admission Medications   Prescriptions Last Dose Informant Patient Reported? Taking?   Alcohol Swabs PADS   No No   Sig: Use to swab the area of the injection or sergio as directed   Per insurance coverage   Calcium Carb-Cholecalciferol (CALCIUM CARBONATE-VITAMIN D3) 600-400 MG-UNIT TABS   No No   Sig: TAKE ONE TABLET BY MOUTH TWICE A DAY WITH MEALS   Dulaglutide (TRULICITY) 3 MG/0.5ML SOPN   No No   Sig: Inject 3 mg Subcutaneous once a week   acetaminophen (TYLENOL) 325 MG tablet   No No   Sig: Take 3 tablets (975 mg) by mouth every 8 hours as needed for mild pain   aspirin (ASA) 81 MG chewable tablet   No No   Sig: Take 1 tablet (81 mg) by mouth daily   Patient taking differently: Take 81 mg by mouth every morning   blood glucose (NO BRAND SPECIFIED) test strip   No No   Sig: Use to test blood sugar 4 times daily or as  directed.   blood glucose monitoring (ACCU-CHEK FELIPE SMARTVIEW) meter device kit  Self No No   Sig: Use to test blood sugar 3-4 times daily, as directed.   blood glucose monitoring (SOFTCLIX) lancets   No No   Si each 4 times daily Use to test blood sugars 2 times daily.   empagliflozin (JARDIANCE) 10 MG TABS tablet   Yes No   Sig: Take 10 mg by mouth daily   Patient not taking: Reported on 2023   ferrous sulfate (FEROSUL) 325 (65 Fe) MG tablet   No No   Sig: Take 1 tablet (325 mg) by mouth daily (with breakfast)   insulin NPH (HUMULIN N KWIKPEN) 100 UNIT/ML injection   No No   Sig: GIve 40 units each morning and 12 at night subcutaneous   insulin lispro (HUMALOG KWIKPEN) 100 UNIT/ML (1 unit dial) KWIKPEN   Yes No   Sig: Inject 8-14 Units Subcutaneous 3 times daily (before meals)   insulin pen needle (32G X 4 MM) 32G X 4 MM miscellaneous   No No   Sig: Use 5-6 pen needles daily or as directed.   ketorolac (ACULAR) 0.5 % ophthalmic solution   No No   Sig: Place 1 drop into the right eye 2 times daily   latanoprost (XALATAN) 0.005 % ophthalmic solution   Yes No   Sig: Place 1 drop into both eyes at bedtime   magnesium oxide 400 MG tablet   No No   Sig: Take 1 tablet (400 mg) by mouth 2 times daily   mycophenolate (GENERIC EQUIVALENT) 500 MG tablet   No No   Sig: Take 3 tablets (1,500 mg) by mouth 2 times daily   omega-3 acid ethyl esters (LOVAZA) 1 g capsule   No No   Sig: Take 1 capsule (1 g) by mouth daily   prednisoLONE acetate (PRED FORTE) 1 % ophthalmic suspension   No No   Sig: Place 1 drop into the right eye 2 times daily   rosuvastatin (CRESTOR) 20 MG tablet   No No   Sig: Take 1 tablet (20 mg) by mouth daily   senna-docusate (SENOKOT-S/PERICOLACE) 8.6-50 MG tablet   No No   Sig: Take 1 tablet by mouth 2 times daily as needed (hold for loose stools)   sirolimus (GENERIC EQUIVALENT) 0.5 MG tablet   No No   Sig: Take 6 tablets (3 mg) by mouth daily   sulfamethoxazole-trimethoprim (BACTRIM) 400-80 MG  tablet   No No   Sig: Take 1 tablet by mouth three times a week   tamsulosin (FLOMAX) 0.4 MG capsule   No No   Sig: TAKE ONE CAPSULE BY MOUTH ONCE DAILY      Facility-Administered Medications: None           Physical Exam   Vital Signs: Temp: 98.3  F (36.8  C) Temp src: Oral BP: 136/65 Pulse: 92   Resp: 17 SpO2: 98 % O2 Device: None (Room air)      Physical Exam   Constitutional:   Well nourished, well developed, resting comfortably   Cardiovascular: Regular rate and rhythm without murmurs or gallops  Pulmonary/Chest: Clear to auscultation bilaterally, with no wheezes or retractions. No respiratory distress.  GI: Soft with good bowel sounds.  Non-tender, non-distended, with no guarding, no rebound, no peritoneal signs.   Musculoskeletal:  No edema or clubbing   Skin: Skin is warm and dry. No rash noted.   Neurological: Alert and oriented to person, place, and time. Nonfocal exam  Psychiatric:  Normal mood and affect.      Medical Decision Making       30 MINUTES SPENT BY ME on the date of service doing chart review, history, exam, documentation & further activities per the note.      Data   CBC, BMP, iron studies, retic count reviewed

## 2023-11-17 NOTE — PROGRESS NOTES
Outpatient/Observation goals to be met before discharge home:     /69 (BP Location: Right arm)   Pulse 81   Temp 98.4  F (36.9  C) (Oral)   Resp 16   SpO2 98%       -Hgb stable in the am: not met, Hgb from this morning was 6.9, requires transfusion

## 2023-11-17 NOTE — PROGRESS NOTES
Follow-up with anemia management service:    Hgb yesterday 6.4. He was sent to ED for transfusion, and was admitted for further observation.   Received 1 unit PRBCs on 741969  Hgb this morning 6.9. Likely getting transfused today.          Latest Ref Rng & Units 10/18/2023    12:06 PM 10/25/2023    10:05 AM 11/8/2023     9:08 AM 11/8/2023    10:53 AM 11/16/2023    10:22 AM 11/16/2023     7:14 PM 11/17/2023     5:56 AM   Anemia   Hemoglobin 13.3 - 17.7 g/dL  13.3 - 17.7 g/dL 8.3  8.0  6.9  6.8  6.4  5.8  6.9     6.9    Ferritin 31 - 409 ng/mL     334        Will monitor for discharge.  First Aranesp dose is scheduled for 112223 11a    Follow-up call date: 112223    Carrie Leopold, RN BSN  Anemia Services  [facility]  [email or web address]  Office: [phone]  Fax [phone]  **NOTE: Anemia Management Services staff will be out of the office on Thanksgiving, 11/23 and Friday, 11/24.   Please reach out to your nurse care coordinator for any questions or concerns during this time.

## 2023-11-21 RX ORDER — ALBUTEROL SULFATE 0.83 MG/ML
2.5 SOLUTION RESPIRATORY (INHALATION)
Status: CANCELLED | OUTPATIENT
Start: 2023-11-21

## 2023-11-21 RX ORDER — EPINEPHRINE 1 MG/ML
0.3 INJECTION, SOLUTION INTRAMUSCULAR; SUBCUTANEOUS EVERY 5 MIN PRN
Status: CANCELLED | OUTPATIENT
Start: 2023-11-21

## 2023-11-21 RX ORDER — MEPERIDINE HYDROCHLORIDE 25 MG/ML
25 INJECTION INTRAMUSCULAR; INTRAVENOUS; SUBCUTANEOUS EVERY 30 MIN PRN
Status: CANCELLED | OUTPATIENT
Start: 2023-11-21

## 2023-11-21 RX ORDER — DIPHENHYDRAMINE HYDROCHLORIDE 50 MG/ML
50 INJECTION INTRAMUSCULAR; INTRAVENOUS
Status: CANCELLED
Start: 2023-11-21

## 2023-11-21 RX ORDER — ALBUTEROL SULFATE 90 UG/1
1-2 AEROSOL, METERED RESPIRATORY (INHALATION)
Status: CANCELLED
Start: 2023-11-21

## 2023-11-21 RX ORDER — METHYLPREDNISOLONE SODIUM SUCCINATE 125 MG/2ML
125 INJECTION, POWDER, LYOPHILIZED, FOR SOLUTION INTRAMUSCULAR; INTRAVENOUS
Status: CANCELLED
Start: 2023-11-21

## 2023-11-21 NOTE — PROGRESS NOTES
Follow-up with anemia management service:    Lucian was discharged to home on 111723 after another 1 unit(s) PRBCs.        Latest Ref Rng & Units 11/8/2023    10:53 AM 11/16/2023    10:22 AM 11/16/2023     3:13 PM 11/16/2023     7:14 PM 11/17/2023     5:56 AM 11/17/2023     1:59 PM 11/17/2023     3:13 PM   Anemia   Hemoglobin 13.3 - 17.7 g/dL 6.8  6.4   5.8  6.9     6.9  8.5     Ferritin 31 - 409 ng/mL  334         PRBCs    1 unit    1 unit       Follow-up call date: 112223, 11a dose    Carrie Leopold, RN BSN  Anemia Services  [facility]  [email or web address]  Office: [phone]  Fax [phone]  **NOTE: Anemia Management Services staff will be out of the office on Thanksgiving, 11/23 and Friday, 11/24.   Please reach out to your nurse care coordinator for any questions or concerns during this time.

## 2023-11-22 ENCOUNTER — LAB (OUTPATIENT)
Dept: LAB | Facility: CLINIC | Age: 68
End: 2023-11-22
Payer: COMMERCIAL

## 2023-11-22 ENCOUNTER — PATIENT OUTREACH (OUTPATIENT)
Dept: CARE COORDINATION | Facility: CLINIC | Age: 68
End: 2023-11-22

## 2023-11-22 ENCOUNTER — INFUSION THERAPY VISIT (OUTPATIENT)
Dept: INFUSION THERAPY | Facility: CLINIC | Age: 68
End: 2023-11-22
Attending: NURSE PRACTITIONER
Payer: COMMERCIAL

## 2023-11-22 VITALS
OXYGEN SATURATION: 98 % | HEART RATE: 83 BPM | SYSTOLIC BLOOD PRESSURE: 145 MMHG | TEMPERATURE: 97.9 F | DIASTOLIC BLOOD PRESSURE: 69 MMHG | RESPIRATION RATE: 16 BRPM

## 2023-11-22 DIAGNOSIS — N18.4 ANEMIA OF CHRONIC RENAL FAILURE, STAGE 4 (SEVERE) (H): Primary | ICD-10-CM

## 2023-11-22 DIAGNOSIS — Z94.1 HEART REPLACED BY TRANSPLANT (H): ICD-10-CM

## 2023-11-22 DIAGNOSIS — D63.1 ANEMIA OF CHRONIC RENAL FAILURE, STAGE 4 (SEVERE) (H): ICD-10-CM

## 2023-11-22 DIAGNOSIS — N18.4 CKD (CHRONIC KIDNEY DISEASE) STAGE 4, GFR 15-29 ML/MIN (H): ICD-10-CM

## 2023-11-22 DIAGNOSIS — N18.4 ANEMIA OF CHRONIC RENAL FAILURE, STAGE 4 (SEVERE) (H): ICD-10-CM

## 2023-11-22 DIAGNOSIS — Z94.1 HEART TRANSPLANT, ORTHOTOPIC, STATUS (H): ICD-10-CM

## 2023-11-22 DIAGNOSIS — D63.1 ANEMIA OF CHRONIC RENAL FAILURE, STAGE 4 (SEVERE) (H): Primary | ICD-10-CM

## 2023-11-22 LAB
HCT VFR BLD AUTO: 27.9 % (ref 40–53)
HGB BLD-MCNC: 9 G/DL (ref 13.3–17.7)

## 2023-11-22 PROCEDURE — 85018 HEMOGLOBIN: CPT | Performed by: PATHOLOGY

## 2023-11-22 PROCEDURE — 36415 COLL VENOUS BLD VENIPUNCTURE: CPT | Performed by: PATHOLOGY

## 2023-11-22 PROCEDURE — 85014 HEMATOCRIT: CPT | Performed by: PATHOLOGY

## 2023-11-22 PROCEDURE — 250N000011 HC RX IP 250 OP 636: Mod: JZ | Performed by: NURSE PRACTITIONER

## 2023-11-22 PROCEDURE — 96372 THER/PROPH/DIAG INJ SC/IM: CPT | Performed by: NURSE PRACTITIONER

## 2023-11-22 RX ADMIN — DARBEPOETIN ALFA 40 MCG: 40 INJECTION, SOLUTION INTRAVENOUS; SUBCUTANEOUS at 11:04

## 2023-11-22 NOTE — PROGRESS NOTES
Anemia Management Note  SUBJECTIVE/OBJECTIVE:  Referred by Arlin Guajardo NP  on 2023  Primary Diagnosis: Anemia in Chronic Kidney Disease (N18.4, D63.1)     Secondary Diagnosis:  Other-Heart Transplant (Z94.1)  Transplant [id number]  Hgb goal range:  9-10  Epo/Darbo: Aranesp  40 mcg  every two weeks  In clinic  Iron regimen:  Ferrous Sulfate  once daily  Labs : 257248  Recent DANDY use, transfusion, IV iron: none  RX/TX plans : 109345  No history of stroke, MI, or cancers. Hx venous thrombus. Previously anticoagulated.  Contact:            Ok to leave message regarding Scheduling, billing and medical information per consent to communicate dated 310                              OK to speak with children Elisabeth Rivers and Lucian Oliveira Jr. regarding Scheduling, billing and medical information per consent to communicate dated 310        Latest Ref Rng & Units 2023     3:13 PM 2023     7:14 PM 2023     5:56 AM 2023     1:59 PM 2023     3:13 PM 2023    10:02 AM 2023     1:00 PM   Anemia   DANDY Dose        40mcg   Hemoglobin 13.3 - 17.7 g/dL  5.8  6.9     6.9  8.5   9.0     PRBCs  1 unit    1 unit       BP Readings from Last 3 Encounters:   23 (!) 145/69   23 (!) 150/68   23 139/74     Wt Readings from Last 2 Encounters:   23 78.5 kg (173 lb)   23 78 kg (172 lb)         ASSESSMENT:  Hgb:at goal - received dose in clinic - recommend continue current regimen  TSat: not at goal of >30% Ferritin: At goal (>100ng/mL)    PLAN:  Dose with aranesp and RTC for hgb then aranesp if needed in 2 week(s)    Orders needed to be renewed (for next follow-up date) in EPIC: None    Iron labs due:  every 4 weeks due mid Dec 2023    Plan discussed with:  no call, chart reviewed      NEXT FOLLOW-UP DATE:  903866- dose due , is it scheduled?    Sharmila Leopold, RN BSN  Anemia Services  [email or web address]  Office: [phone]   Fax [phone]  **NOTE: Anemia Management Services staff will be out of the office on Thanksgiving, 11/23 and Friday, 11/24.   Please reach out to your nurse care coordinator for any questions or concerns during this time.

## 2023-11-22 NOTE — PROGRESS NOTES
Chief Complaint   Patient presents with    Injections     Arenesp       Infusion Nursing Note:  Lucian Oliveira presents today for Arenesp injection.    Patient seen by provider today: No   present during visit today: Yes, Language: Japanese.     Note: Lucian [facility] to [facility] feeling well. Vitals completed, allergies and medications reviewed. Gave first dose Arenesp in ULQ abdomen.    Intravenous Access:  No Intravenous access/labs at this visit.    Treatment Conditions:  Results reviewed, labs MET treatment parameters, ok to proceed with treatment.    Post Infusion Assessment:  Patient tolerated injection without incident.  Patient observed for 15 minutes post injection for first dose protocol.     Discharge Plan:   Discharge instructions reviewed with: Patient.  Patient and/or family verbalized understanding of discharge instructions and all questions answered.  AVS to patient via MYCHART.  Patient discharged in stable condition accompanied by: self.  Departure Mode: Ambulatory.    Administrations This Visit       darbepoetin stefano-polysorbate (ARANESP) injection 40 mcg       Admin Date  11/22/2023 Action  $Given Dose  40 mcg Route  Subcutaneous Documented By  Jyoti Pedroza                  BP (!) 145/69 (BP Location: Right arm, Patient Position: Left side)   Pulse 83   Temp 97.9  F (36.6  C) (Oral)   Resp 16   SpO2 98%     Jyoti Pedroza, Nursing Student

## 2023-11-22 NOTE — PATIENT INSTRUCTIONS
Dear Lucian Oliveira    Thank you for choosing [facility] ([facility]) for your Arenesp injection.  The following information is a summary of our appointment as well as important reminders.      If you are a transplant patient and require transplant education, please click on this link: [email or web address].    We look forward in seeing you on your next appointment here at [facility] ([facility]).  Please don t hesitate to call us at [phone] to reschedule any of your appointments or to speak with one of the [facility] registered nurses.  It was a pleasure taking care of you today.    Sincerely,    [facility]  [facility]  [address] [place] [address]  [address]  Phone:  [phone]

## 2023-12-01 DIAGNOSIS — E11.3513 TYPE 2 DIABETES MELLITUS WITH PROLIFERATIVE RETINOPATHY OF BOTH EYES AND MACULAR EDEMA, UNSPECIFIED WHETHER LONG TERM INSULIN USE (H): Primary | ICD-10-CM

## 2023-12-02 NOTE — TELEPHONE ENCOUNTER
insulin lispro (HUMALOG KWIKPEN) 100 UNIT/ML (1 unit dial) KWIKPEN   Historical    11/7/2023  [facility] [place]      Marisabel Prieto PA-C  Endocrinology, Diabetes, and Metabolism       Routed because:  endo triage to fill . Historical on med list.

## 2023-12-04 ENCOUNTER — PATIENT OUTREACH (OUTPATIENT)
Dept: CARE COORDINATION | Facility: CLINIC | Age: 68
End: 2023-12-04

## 2023-12-04 RX ORDER — INSULIN LISPRO 100 [IU]/ML
INJECTION, SOLUTION INTRAVENOUS; SUBCUTANEOUS
Qty: 60 ML | Refills: 2 | Status: SHIPPED | OUTPATIENT
Start: 2023-12-04 | End: 2024-01-16

## 2023-12-04 NOTE — PROGRESS NOTES
Follow-up with anemia management service:    Lucian is due for labs and Aranesp this week. No appt is currently scheduled.    Called Lucian with . Advised him that labs/Aranesp are due this week   He agreed to schedule labs and dose. Transferred him to [facility] scheduling with       Latest Ref Rng & Units 11/16/2023     3:13 PM 11/16/2023     7:14 PM 11/17/2023     5:56 AM 11/17/2023     1:59 PM 11/17/2023     3:13 PM 11/22/2023    10:02 AM 11/22/2023     1:00 PM   Anemia   DANDY Dose        40mcg   Hemoglobin 13.3 - 17.7 g/dL  5.8  6.9     6.9  8.5   9.0     PRBCs  1 unit    1 unit         Follow-up call date: 121123 11a dose    Carrie Leopold, RN BSN  Anemia Services  [facility]  [email or web address]  Office: [phone]  Fax [phone]

## 2023-12-11 ENCOUNTER — INFUSION THERAPY VISIT (OUTPATIENT)
Dept: INFUSION THERAPY | Facility: CLINIC | Age: 68
End: 2023-12-11
Attending: NURSE PRACTITIONER
Payer: COMMERCIAL

## 2023-12-11 ENCOUNTER — APPOINTMENT (OUTPATIENT)
Dept: LAB | Facility: CLINIC | Age: 68
End: 2023-12-11
Attending: NURSE PRACTITIONER
Payer: COMMERCIAL

## 2023-12-11 ENCOUNTER — PATIENT OUTREACH (OUTPATIENT)
Dept: CARE COORDINATION | Facility: CLINIC | Age: 68
End: 2023-12-11

## 2023-12-11 VITALS
RESPIRATION RATE: 16 BRPM | HEART RATE: 87 BPM | OXYGEN SATURATION: 100 % | WEIGHT: 179.8 LBS | TEMPERATURE: 97.9 F | BODY MASS INDEX: 28.16 KG/M2 | DIASTOLIC BLOOD PRESSURE: 77 MMHG | SYSTOLIC BLOOD PRESSURE: 143 MMHG

## 2023-12-11 DIAGNOSIS — Z94.1 HEART TRANSPLANT, ORTHOTOPIC, STATUS (H): ICD-10-CM

## 2023-12-11 DIAGNOSIS — N18.4 CKD (CHRONIC KIDNEY DISEASE) STAGE 4, GFR 15-29 ML/MIN (H): ICD-10-CM

## 2023-12-11 DIAGNOSIS — Z94.1 HEART REPLACED BY TRANSPLANT (H): ICD-10-CM

## 2023-12-11 DIAGNOSIS — D63.1 ANEMIA OF CHRONIC RENAL FAILURE, STAGE 4 (SEVERE) (H): Primary | ICD-10-CM

## 2023-12-11 DIAGNOSIS — N18.4 ANEMIA OF CHRONIC RENAL FAILURE, STAGE 4 (SEVERE) (H): Primary | ICD-10-CM

## 2023-12-11 LAB
FERRITIN SERPL-MCNC: 304 NG/ML (ref 31–409)
HCT VFR BLD AUTO: 25.4 % (ref 40–53)
HGB BLD-MCNC: 8 G/DL (ref 13.3–17.7)
IRON BINDING CAPACITY (ROCHE): 209 UG/DL (ref 240–430)
IRON SATN MFR SERPL: 23 % (ref 15–46)
IRON SERPL-MCNC: 49 UG/DL (ref 61–157)

## 2023-12-11 PROCEDURE — 36415 COLL VENOUS BLD VENIPUNCTURE: CPT

## 2023-12-11 PROCEDURE — 96372 THER/PROPH/DIAG INJ SC/IM: CPT | Performed by: NURSE PRACTITIONER

## 2023-12-11 PROCEDURE — 83550 IRON BINDING TEST: CPT

## 2023-12-11 PROCEDURE — 85018 HEMOGLOBIN: CPT | Performed by: NURSE PRACTITIONER

## 2023-12-11 PROCEDURE — 82728 ASSAY OF FERRITIN: CPT

## 2023-12-11 PROCEDURE — 250N000011 HC RX IP 250 OP 636: Mod: JZ,EC | Performed by: NURSE PRACTITIONER

## 2023-12-11 PROCEDURE — 85014 HEMATOCRIT: CPT | Performed by: NURSE PRACTITIONER

## 2023-12-11 RX ORDER — METHYLPREDNISOLONE SODIUM SUCCINATE 125 MG/2ML
125 INJECTION, POWDER, LYOPHILIZED, FOR SOLUTION INTRAMUSCULAR; INTRAVENOUS
Status: CANCELLED
Start: 2023-12-11

## 2023-12-11 RX ORDER — ALBUTEROL SULFATE 90 UG/1
1-2 AEROSOL, METERED RESPIRATORY (INHALATION)
Status: CANCELLED
Start: 2023-12-11

## 2023-12-11 RX ORDER — MEPERIDINE HYDROCHLORIDE 25 MG/ML
25 INJECTION INTRAMUSCULAR; INTRAVENOUS; SUBCUTANEOUS EVERY 30 MIN PRN
Status: CANCELLED | OUTPATIENT
Start: 2023-12-11

## 2023-12-11 RX ORDER — ALBUTEROL SULFATE 0.83 MG/ML
2.5 SOLUTION RESPIRATORY (INHALATION)
Status: CANCELLED | OUTPATIENT
Start: 2023-12-11

## 2023-12-11 RX ORDER — DIPHENHYDRAMINE HYDROCHLORIDE 50 MG/ML
50 INJECTION INTRAMUSCULAR; INTRAVENOUS
Status: CANCELLED
Start: 2023-12-11

## 2023-12-11 RX ORDER — EPINEPHRINE 1 MG/ML
0.3 INJECTION, SOLUTION INTRAMUSCULAR; SUBCUTANEOUS EVERY 5 MIN PRN
Status: CANCELLED | OUTPATIENT
Start: 2023-12-11

## 2023-12-11 RX ADMIN — DARBEPOETIN ALFA 40 MCG: 40 INJECTION, SOLUTION INTRAVENOUS; SUBCUTANEOUS at 10:48

## 2023-12-11 ASSESSMENT — PAIN SCALES - GENERAL: PAINLEVEL: NO PAIN (0)

## 2023-12-11 NOTE — NURSING NOTE
Chief Complaint   Patient presents with    Labs Only     Venipuncture,vitals checked     Sandie Willis RN on 12/11/2023 at 10:16 AM

## 2023-12-11 NOTE — PROGRESS NOTES
Anemia Management Note  SUBJECTIVE/OBJECTIVE:  Referred by Arlin Guajardo NP  on 2023  Primary Diagnosis: Anemia in Chronic Kidney Disease (N18.4, D63.1)     Secondary Diagnosis:  Other-Heart Transplant (Z94.1)  Transplant [id number]  Hgb goal range:  9-10  Epo/Darbo: Aranesp  40 mcg  every two weeks  In clinic  Iron regimen:  Ferrous Sulfate  once daily  Labs : 623051  Recent DANDY use, transfusion, IV iron: none  RX/TX plans : 593950  No history of stroke, MI, or cancers. Hx venous thrombus. Previously anticoagulated.  Contact:            Ok to leave message regarding Scheduling, billing and medical information per consent to communicate dated 310                              OK to speak with children Elisabeth Rivers and Lucian Oliveira Jr. regarding Scheduling, billing and medical information per consent to communicate dated 310        Latest Ref Rng & Units 2023     5:56 AM 2023     1:59 PM 2023     3:13 PM 2023    10:02 AM 2023     1:00 PM 2023    10:13 AM 2023     1:00 PM   Anemia   DANDY Dose      40mcg  40mcg   Hemoglobin 13.3 - 17.7 g/dL 6.9     6.9  8.5   9.0   8.0     Ferritin 31 - 409 ng/mL      304     PRBCs    1 unit         BP Readings from Last 3 Encounters:   23 (!) 143/77   23 (!) 145/69   23 (!) 150/68     Wt Readings from Last 2 Encounters:   23 81.6 kg (179 lb 12.8 oz)   23 78.5 kg (173 lb)         ASSESSMENT:  Hgb:Not at goal/Known/Dose given in clinic today  Dose was due to be given on 1206. Drop in hgb could be related to longer time between doses.  TSat: not at goal of >30% Ferritin: At goal (>100ng/mL)    PLAN:  Dose with aranesp and RTC for hgb then aranesp if needed in 2 week(s)    Orders needed to be renewed (for next follow-up date) in EPIC: None    Iron labs due:  every 4 weeks, due mid 2023    Plan discussed with:  no call, chart reviewed      NEXT FOLLOW-UP DATE:  122840 noon dose. Consider  increased dose if hgb below goal     Carrie Leopold, RN BSN  Anemia Services  [facility]  [email or web address]  Office: [phone]  Fax [phone]

## 2023-12-11 NOTE — PROGRESS NOTES
Infusion Nursing Note:  Lucian Oliveira presents today for Aranesp.    Patient seen by provider today: No   present during visit today: Yes, Language: Lithuanian.     Note: Aranesp injection given subcutaneous on (left left lower abdomen per patient request).  Administrations This Visit       darbepoetin stefano-polysorbate (ARANESP) injection 40 mcg       Admin Date  12/11/2023 Action  $Given Dose  40 mcg Route  Subcutaneous Documented By  Bouchra Donahue RN                     Intravenous Access:  N/A.    Treatment Conditions:   Latest Reference Range & Units 12/11/23 10:13   Hemoglobin 13.3 - 17.7 g/dL 8.0 (L)   (L): Data is abnormally low    Post Infusion Assessment:  Patient tolerated injection without incident.  Site patent and intact, free from redness, edema or discomfort.       Discharge Plan:   Discharge instructions reviewed with: Patient and family member.  Patient and/or family verbalized understanding of discharge instructions and all questions answered.  AVS to patient via [employer].  Patient will return 12/26/23 for next appointment.   Patient discharged in stable condition accompanied by: family member.  Departure Mode: Ambulatory.    BP (!) 143/77 (BP Location: Right arm, Patient Position: Sitting)   Pulse 87   Temp 97.9  F (36.6  C)   Resp 16   Wt 81.6 kg (179 lb 12.8 oz)   SpO2 100%   BMI 28.16 kg/m       Bouchra Donahue RN

## 2023-12-11 NOTE — PATIENT INSTRUCTIONS
Dear Lucian Oliveira    Thank you for choosing [facility] ([facility]) for your injection.  The following information is a summary of our appointment as well as important reminders.        If you are a transplant patient and require transplant education, please click on this link: [email or web address].    We look forward in seeing you on your next appointment here at [facility] ([facility]).  Please don t hesitate to call us at [phone] to reschedule any of your appointments or to speak with one of the [facility] registered nurses.  It was a pleasure taking care of you today.    Sincerely,    [facility]  [facility]  [address] [place] [address]  [id number]  Phone:  [phone]

## 2023-12-25 RX ORDER — DIPHENHYDRAMINE HYDROCHLORIDE 50 MG/ML
50 INJECTION INTRAMUSCULAR; INTRAVENOUS
Status: CANCELLED
Start: 2023-12-25

## 2023-12-25 RX ORDER — MEPERIDINE HYDROCHLORIDE 25 MG/ML
25 INJECTION INTRAMUSCULAR; INTRAVENOUS; SUBCUTANEOUS EVERY 30 MIN PRN
Status: CANCELLED | OUTPATIENT
Start: 2023-12-25

## 2023-12-25 RX ORDER — EPINEPHRINE 1 MG/ML
0.3 INJECTION, SOLUTION INTRAMUSCULAR; SUBCUTANEOUS EVERY 5 MIN PRN
Status: CANCELLED | OUTPATIENT
Start: 2023-12-25

## 2023-12-25 RX ORDER — METHYLPREDNISOLONE SODIUM SUCCINATE 125 MG/2ML
125 INJECTION, POWDER, LYOPHILIZED, FOR SOLUTION INTRAMUSCULAR; INTRAVENOUS
Status: CANCELLED
Start: 2023-12-25

## 2023-12-25 RX ORDER — ALBUTEROL SULFATE 0.83 MG/ML
2.5 SOLUTION RESPIRATORY (INHALATION)
Status: CANCELLED | OUTPATIENT
Start: 2023-12-25

## 2023-12-25 RX ORDER — ALBUTEROL SULFATE 90 UG/1
1-2 AEROSOL, METERED RESPIRATORY (INHALATION)
Status: CANCELLED
Start: 2023-12-25

## 2023-12-26 ENCOUNTER — APPOINTMENT (OUTPATIENT)
Dept: LAB | Facility: CLINIC | Age: 68
End: 2023-12-26
Attending: NURSE PRACTITIONER
Payer: COMMERCIAL

## 2023-12-26 ENCOUNTER — PATIENT OUTREACH (OUTPATIENT)
Dept: CARE COORDINATION | Facility: CLINIC | Age: 68
End: 2023-12-26

## 2023-12-26 ENCOUNTER — INFUSION THERAPY VISIT (OUTPATIENT)
Dept: INFUSION THERAPY | Facility: CLINIC | Age: 68
End: 2023-12-26
Attending: NURSE PRACTITIONER
Payer: COMMERCIAL

## 2023-12-26 VITALS
BODY MASS INDEX: 28.22 KG/M2 | RESPIRATION RATE: 18 BRPM | HEART RATE: 93 BPM | DIASTOLIC BLOOD PRESSURE: 61 MMHG | TEMPERATURE: 97.7 F | WEIGHT: 180.2 LBS | SYSTOLIC BLOOD PRESSURE: 147 MMHG | OXYGEN SATURATION: 100 %

## 2023-12-26 DIAGNOSIS — D63.1 ANEMIA OF CHRONIC RENAL FAILURE, STAGE 4 (SEVERE) (H): Primary | ICD-10-CM

## 2023-12-26 DIAGNOSIS — Z94.1 HEART TRANSPLANT, ORTHOTOPIC, STATUS (H): ICD-10-CM

## 2023-12-26 DIAGNOSIS — N18.4 ANEMIA OF CHRONIC RENAL FAILURE, STAGE 4 (SEVERE) (H): Primary | ICD-10-CM

## 2023-12-26 DIAGNOSIS — N18.4 CKD (CHRONIC KIDNEY DISEASE) STAGE 4, GFR 15-29 ML/MIN (H): ICD-10-CM

## 2023-12-26 LAB
HCT VFR BLD AUTO: 25.2 % (ref 40–53)
HGB BLD-MCNC: 8.1 G/DL (ref 13.3–17.7)

## 2023-12-26 PROCEDURE — 250N000011 HC RX IP 250 OP 636: Mod: JZ | Performed by: NURSE PRACTITIONER

## 2023-12-26 PROCEDURE — 96372 THER/PROPH/DIAG INJ SC/IM: CPT | Performed by: NURSE PRACTITIONER

## 2023-12-26 PROCEDURE — 85014 HEMATOCRIT: CPT | Performed by: NURSE PRACTITIONER

## 2023-12-26 PROCEDURE — 85018 HEMOGLOBIN: CPT | Performed by: NURSE PRACTITIONER

## 2023-12-26 PROCEDURE — 36415 COLL VENOUS BLD VENIPUNCTURE: CPT | Performed by: NURSE PRACTITIONER

## 2023-12-26 RX ADMIN — DARBEPOETIN ALFA 40 MCG: 40 INJECTION, SOLUTION INTRAVENOUS; SUBCUTANEOUS at 12:09

## 2023-12-26 ASSESSMENT — PAIN SCALES - GENERAL: PAINLEVEL: NO PAIN (0)

## 2023-12-26 NOTE — PROGRESS NOTES
Nursing Note  Lucian Oliveira presents [facility] for:   Chief Complaint   Patient presents with    Blood Draw     Labs drawn via  by RN.     Imm/Inj     Aranesp       During today's [facility] appointment, orders from Arlin Guajardo NP were completed.  Frequency: every 14 days PRN    Progress note:  Patient identification verified by name and date of birth.  Assessment completed.  Vitals recorded in Doc Flowsheets.  Patient was provided with education regarding medication/procedure and possible side effects.  Patient verbalized understanding.     present during visit today: Not Applicable.    Treatment Conditions: All parameters met to be able to proceed w/Aranesp injection today. Hgb today = 8.1; blood pressure today = 147/61.    Premedications: were not ordered.    Drug Waste Record: No    Injection site:  Left upper abdomen    Labs: drawn today, prior to appointment in second floor lab.    Vascular access: NA    Is the next appt scheduled? Yes    Post Infusion Assessment:  Patient tolerated injection without incident.  Site patent and intact, free from redness, edema or discomfort.     Discharge Plan:   Follow up plan of care with: ongoing injections at [facility] & transplant coordinator.  Discharge instructions were reviewed with patient.  Patient/representative verbalized understanding of discharge instructions and all questions answered.  Patient discharged from [facility] in stable condition.  Patient declined AVS.    Sonam Rutledge RN    Administrations This Visit       darbepoetin stefano-polysorbate (ARANESP) injection 40 mcg       Admin Date  12/26/2023 Action  $Given Dose  40 mcg Route  Subcutaneous Documented By  Sonam Rutledge RN                    BP (!) 147/61 (BP Location: Right arm, Patient Position: Sitting, Cuff Size: Adult Regular)   Pulse 93    Temp 97.7  F (36.5  C) (Oral)   Resp 18   Wt 81.7 kg (180 lb 3.2 oz)   SpO2 100%   BMI 28.22 kg/m

## 2023-12-26 NOTE — NURSING NOTE
Chief Complaint   Patient presents with    Blood Draw     Labs drawn via  by RN.      Labs drawn with  by RN. Vitals taken. Patient checked into next appointment.    Ann Payton RN

## 2023-12-26 NOTE — PROGRESS NOTES
Anemia Management Note  SUBJECTIVE/OBJECTIVE:  Referred by Arlin Guajardo NP  on 2023  Primary Diagnosis: Anemia in Chronic Kidney Disease (N18.4, D63.1)     Secondary Diagnosis:  Other-Heart Transplant (Z94.1)  Transplant [id number]  Hgb goal range:  9-10  Epo/Darbo: Aranesp  40 mcg  every two weeks  In clinic  Iron regimen:  Ferrous Sulfate  once daily  Labs : 021957  Recent DANDY use, transfusion, IV iron: none  RX/TX plans : 1112  No history of stroke, MI, or cancers. Hx venous thrombus. Previously anticoagulated.  Contact:            Ok to leave message regarding Scheduling, billing and medical information per consent to communicate dated 310                              OK to speak with children Elisabeth Rivers and Lucian Oliveira Jr. regarding Scheduling, billing and medical information per consent to communicate dated 310        Latest Ref Rng & Units 2023     3:13 PM 2023    10:02 AM 2023     1:00 PM 2023    10:13 AM 2023     1:00 PM 2023    11:23 AM 2023     2:00 PM   Anemia   DANDY Dose    40mcg  40mcg  40mcg   Hemoglobin 13.3 - 17.7 g/dL  9.0   8.0   8.1     Ferritin 31 - 409 ng/mL    304       PRBCs  1 unit           BP Readings from Last 3 Encounters:   23 (!) 147/61   23 (!) 143/77   23 (!) 145/69     Wt Readings from Last 2 Encounters:   23 81.7 kg (180 lb 3.2 oz)   23 81.6 kg (179 lb 12.8 oz)         ASSESSMENT:  Hgb:not at goal - received dose in clinic - recommend continue current regimen  Slight increase over last Hgb  TSat: not at goal of >30% Ferritin: At goal (>100ng/mL)    PLAN:  Dose with aranesp and RTC for hgb then aranesp if needed in 2 week(s)    Orders needed to be renewed (for next follow-up date) in EPIC: None    Iron labs due:  mid 2024    Plan discussed with:  no call, chart reviewed      NEXT FOLLOW-UP DATE:  110 1p dose    Carrie Leopold, RN BSN  Anemia Services  [facility]  [email or web address]  Office: [phone]  Fax [phone]

## 2023-12-28 DIAGNOSIS — E11.3513 TYPE 2 DIABETES MELLITUS WITH PROLIFERATIVE RETINOPATHY OF BOTH EYES AND MACULAR EDEMA, UNSPECIFIED WHETHER LONG TERM INSULIN USE (H): ICD-10-CM

## 2023-12-31 DIAGNOSIS — E11.3513 TYPE 2 DIABETES MELLITUS WITH PROLIFERATIVE RETINOPATHY OF BOTH EYES AND MACULAR EDEMA, UNSPECIFIED WHETHER LONG TERM INSULIN USE (H): Primary | ICD-10-CM

## 2024-01-03 NOTE — TELEPHONE ENCOUNTER
Trulicity 3 MG/0.5ML Subcutaneous Solution Pen-injector    Inject 3 mg Subcutaneous once a week   Last Written Prescription Date:  11/8/23  Last Fill Quantity: 2 ml ,   # refills: 3  Last Office Visit : 11/7/23  Future Office visit:  1/16/24  A1C=8.7 on 7/17/23  Cr=1.88 on 11/16/23  Routing refill request to provider for review/approval because:   Fails refill protocol due to abn creatinine. Patient with CKD- Abnormal Creatinine  1.88  (improved) on 11/16/23, heart tx patient, also followed by Dr Shelton/ Neph.

## 2024-01-04 ENCOUNTER — OFFICE VISIT (OUTPATIENT)
Dept: OPHTHALMOLOGY | Facility: CLINIC | Age: 69
End: 2024-01-04
Attending: OPHTHALMOLOGY
Payer: COMMERCIAL

## 2024-01-04 DIAGNOSIS — H40.053 BORDERLINE GLAUCOMA OF BOTH EYES WITH OCULAR HYPERTENSION: Primary | ICD-10-CM

## 2024-01-04 DIAGNOSIS — E11.3513 TYPE 2 DIABETES MELLITUS WITH PROLIFERATIVE RETINOPATHY OF BOTH EYES AND MACULAR EDEMA, UNSPECIFIED WHETHER LONG TERM INSULIN USE (H): ICD-10-CM

## 2024-01-04 PROCEDURE — 99207 FUNDUS PHOTOS OU (BOTH EYES): CPT | Mod: 26 | Performed by: OPHTHALMOLOGY

## 2024-01-04 PROCEDURE — 99213 OFFICE O/P EST LOW 20 MIN: CPT | Mod: GC | Performed by: OPHTHALMOLOGY

## 2024-01-04 PROCEDURE — 92250 FUNDUS PHOTOGRAPHY W/I&R: CPT | Performed by: OPHTHALMOLOGY

## 2024-01-04 PROCEDURE — G0463 HOSPITAL OUTPT CLINIC VISIT: HCPCS | Performed by: OPHTHALMOLOGY

## 2024-01-04 PROCEDURE — 92134 CPTRZ OPH DX IMG PST SGM RTA: CPT | Performed by: OPHTHALMOLOGY

## 2024-01-04 RX ORDER — LATANOPROST 50 UG/ML
1 SOLUTION/ DROPS OPHTHALMIC AT BEDTIME
Qty: 7.5 ML | Refills: 4 | Status: SHIPPED | OUTPATIENT
Start: 2024-01-04 | End: 2024-07-22

## 2024-01-04 RX ORDER — DULAGLUTIDE 3 MG/.5ML
INJECTION, SOLUTION SUBCUTANEOUS
Qty: 6 ML | Refills: 3 | Status: SHIPPED | OUTPATIENT
Start: 2024-01-04 | End: 2024-01-25 | Stop reason: DRUGHIGH

## 2024-01-04 ASSESSMENT — TONOMETRY
IOP_METHOD: TONOPEN
OS_IOP_MMHG: 15
OD_IOP_MMHG: 20

## 2024-01-04 ASSESSMENT — EXTERNAL EXAM - LEFT EYE: OS_EXAM: NORMAL

## 2024-01-04 ASSESSMENT — SLIT LAMP EXAM - LIDS
COMMENTS: NORMAL
COMMENTS: NORMAL

## 2024-01-04 ASSESSMENT — REFRACTION_WEARINGRX
SPECS_TYPE: LAST MR
OD_AXIS: 017
OD_ADD: +2.50
OD_SPHERE: -0.50
OD_CYLINDER: +1.00
OS_SPHERE: BALANCE

## 2024-01-04 ASSESSMENT — VISUAL ACUITY
OD_SC+: -2
OS_SC: HM
OD_SC: 20/40
METHOD: SNELLEN - LINEAR

## 2024-01-04 ASSESSMENT — CUP TO DISC RATIO: OD_RATIO: 0.25

## 2024-01-04 ASSESSMENT — EXTERNAL EXAM - RIGHT EYE: OD_EXAM: NORMAL

## 2024-01-04 NOTE — PROGRESS NOTES
CC: follow up proliferative diabetic retinopathy    Interval:   Patient feels vision is stable each eye in the last several weeks. No flashes or floaters.  Ocular meds:   - Prednisolone daily left eye & ketorolac once daily in right eye only. Latanoprost both eyes once daily.     HPI: 68 year old man PMHx of CAD (s/p 3v CABG 2008), ischemic CM, now s/p orthotopic heart transplant 07/2020, CKD, HTN, HLD, obesity, CHANTEL and IDDM2 with history of PDR both eyes who presented to [facility] in 05/17/2019 with bilateral vitreous hemorrhages.      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   Left eye: Irregular retina; ERM; nasal to fovea with large bullous edema-largely stable    FA 10/26/23   right eye: PRP scars. Foci of NV superonasal.     optos consistent with exam 1/4/24    DVF 10/26/23   Right eye: H 110 V 75  Left eye: H only sup area of 50 degrees V70     Assessment and Plan  # history of Proliferative Diabetic Retinopathy, right eye    # with Diabetic macular edema right eye   Optical Coherence Tomography 10/26/23 with mild worsening of mild cystoid macular edema  Currently using   - prednisolone 1x daily left eye till finish   -Restart Ketorolac 1x daily right eye till finish    Fluorescein angiography with persistent neovascularization elsewhere 10.26.23   Status post laser filling 10/26/23 no complications     Consider avastin if Diabetic macular edema worsen    # Vitreous hemorrhage, right eye -  Resolved  (onset 4/2021)   - Responded to multiple Avastin, nearly resolved 08/04/2022   - R/B/A Fill-in PRP right eye 08/24/2022: #764 spots   -  1.25.23 last Avastin right eye     # Proliferative Diabetic Retinopathy, left eye     - NVI, left eye 6/2/2022   - Gonio 6/2/2022 broad PAS superiorly ~3 clock hours, narrow PAS  inferiorly <1 clock hour, no NVA   - Regressing 08/04/2022   - s/p PRP 6/5/2019   - s/p avastin last injection 06/02/2022   - 03/09/23 VA stable CF2' - continue to observe given chronic Retinal detachment   # Funnel RD left eye   - progressed to funnel RD after loss to follow up given heart surgeries     - w retina folded on itself under SO   - guarded prognosis previously discussed   - Drops as below    # s/p CEIOL OD 2/6/23.   doing well.     # Ocuar HTN, both eyes    - 06/15/23 IOP 24/20.   -07/27/23 intraocular pressure under good control on latanoprost    PLAN:   - Continue latanoprost at bedtime both eyes   - when finish prednisolone left eye once a day ok to stop  - Continue Ketorolac 1x daily right eye; when finish ok to stop  Retina detachment precautions were discussed with the patient (presence or increased in flashes, floaters or a curtain in the visual field) and was asked to return if any of the those occur    RTC: 8 weeks with Optical Coherence Tomography     Brionna Salamanca MD  Resident Physician, PGY-3  Department of Ophthalmology      ~~~~~~~~~~~~~~~~~~~~~~~~~~~~~~~~~~   Complete documentation of historical and exam elements from today's encounter can be found in the full encounter summary report (not reduplicated in this progress note).  I personally obtained the chief complaint(s) and history of present illness.  I confirmed and edited as necessary the review of systems, past medical/surgical history, family history, social history, and examination findings as documented by others; and I examined the patient myself.  I personally reviewed the relevant tests, images, and reports as documented above.  I personally reviewed the ophthalmic test(s) associated with this encounter, agree with the interpretation(s) as documented by the resident/fellow, and have edited the corresponding report(s) as necessary.   I formulated and edited as necessary the assessment and plan and discussed the findings and  management plan with the patient and family and I was present for critical portions of the procedure carried out by the resident/fellow and immediately available for the entire procedure    Triny Bunch MD  Professor of Ophthalmology  Vitreo-Retinal surgeon   Department of Ophthalmology and Visual Neurosciences   [facility]  Phone: [phone]   Fax: [phone]

## 2024-01-04 NOTE — NURSING NOTE
Chief Complaints and History of Present Illnesses   Patient presents with    Follow Up      Proliferative Diabetic Retinopathy, both eyes     Chief Complaint(s) and History of Present Illness(es)       Follow Up              Comments:  Proliferative Diabetic Retinopathy, both eyes              Comments    Patient states seeing changes in vision - pt states seeing a shadow in vision left eye>right eye. Patient states having floaters in the morning. Patient denies eye pain of flashes of light.     Patient is currently using latanoprost at bedtime both eyes , prednisolone 1x daily left eye, Ketorolac 1x daily right eye till finish    Cesilia Ray  8:42 AM January 4, 2024

## 2024-01-05 DIAGNOSIS — Z94.1 HEART TRANSPLANT, ORTHOTOPIC, STATUS (H): ICD-10-CM

## 2024-01-05 DIAGNOSIS — Z94.1 HEART REPLACED BY TRANSPLANT (H): ICD-10-CM

## 2024-01-09 RX ORDER — EPINEPHRINE 1 MG/ML
0.3 INJECTION, SOLUTION INTRAMUSCULAR; SUBCUTANEOUS EVERY 5 MIN PRN
OUTPATIENT
Start: 2024-01-09

## 2024-01-09 RX ORDER — METHYLPREDNISOLONE SODIUM SUCCINATE 125 MG/2ML
125 INJECTION, POWDER, LYOPHILIZED, FOR SOLUTION INTRAMUSCULAR; INTRAVENOUS
Start: 2024-01-09

## 2024-01-09 RX ORDER — MEPERIDINE HYDROCHLORIDE 25 MG/ML
25 INJECTION INTRAMUSCULAR; INTRAVENOUS; SUBCUTANEOUS EVERY 30 MIN PRN
OUTPATIENT
Start: 2024-01-09

## 2024-01-09 RX ORDER — ALBUTEROL SULFATE 90 UG/1
1-2 AEROSOL, METERED RESPIRATORY (INHALATION)
Start: 2024-01-09

## 2024-01-09 RX ORDER — DIPHENHYDRAMINE HYDROCHLORIDE 50 MG/ML
50 INJECTION INTRAMUSCULAR; INTRAVENOUS
Start: 2024-01-09

## 2024-01-09 RX ORDER — ALBUTEROL SULFATE 0.83 MG/ML
2.5 SOLUTION RESPIRATORY (INHALATION)
OUTPATIENT
Start: 2024-01-09

## 2024-01-10 ENCOUNTER — APPOINTMENT (OUTPATIENT)
Dept: LAB | Facility: CLINIC | Age: 69
End: 2024-01-10
Attending: NURSE PRACTITIONER
Payer: COMMERCIAL

## 2024-01-10 ENCOUNTER — INFUSION THERAPY VISIT (OUTPATIENT)
Dept: INFUSION THERAPY | Facility: CLINIC | Age: 69
End: 2024-01-10
Attending: NURSE PRACTITIONER
Payer: COMMERCIAL

## 2024-01-10 VITALS
HEART RATE: 91 BPM | RESPIRATION RATE: 16 BRPM | SYSTOLIC BLOOD PRESSURE: 150 MMHG | DIASTOLIC BLOOD PRESSURE: 78 MMHG | OXYGEN SATURATION: 98 % | TEMPERATURE: 97.7 F | BODY MASS INDEX: 28.24 KG/M2 | WEIGHT: 180.3 LBS

## 2024-01-10 DIAGNOSIS — Z94.1 HEART TRANSPLANT, ORTHOTOPIC, STATUS (H): ICD-10-CM

## 2024-01-10 DIAGNOSIS — D63.1 ANEMIA OF CHRONIC RENAL FAILURE, STAGE 4 (SEVERE) (H): Primary | ICD-10-CM

## 2024-01-10 DIAGNOSIS — N18.4 CKD (CHRONIC KIDNEY DISEASE) STAGE 4, GFR 15-29 ML/MIN (H): ICD-10-CM

## 2024-01-10 DIAGNOSIS — Z94.1 HEART REPLACED BY TRANSPLANT (H): ICD-10-CM

## 2024-01-10 DIAGNOSIS — N18.4 ANEMIA OF CHRONIC RENAL FAILURE, STAGE 4 (SEVERE) (H): Primary | ICD-10-CM

## 2024-01-10 LAB
FERRITIN SERPL-MCNC: 277 NG/ML (ref 31–409)
HCT VFR BLD AUTO: 28.7 % (ref 40–53)
HGB BLD-MCNC: 9.1 G/DL (ref 13.3–17.7)
IRON BINDING CAPACITY (ROCHE): 213 UG/DL (ref 240–430)
IRON SATN MFR SERPL: 30 % (ref 15–46)
IRON SERPL-MCNC: 63 UG/DL (ref 61–157)

## 2024-01-10 PROCEDURE — 85018 HEMOGLOBIN: CPT | Performed by: NURSE PRACTITIONER

## 2024-01-10 PROCEDURE — 85014 HEMATOCRIT: CPT | Performed by: NURSE PRACTITIONER

## 2024-01-10 PROCEDURE — 82728 ASSAY OF FERRITIN: CPT

## 2024-01-10 PROCEDURE — 250N000011 HC RX IP 250 OP 636: Mod: JZ | Performed by: NURSE PRACTITIONER

## 2024-01-10 PROCEDURE — 83540 ASSAY OF IRON: CPT

## 2024-01-10 PROCEDURE — 36415 COLL VENOUS BLD VENIPUNCTURE: CPT

## 2024-01-10 PROCEDURE — 83550 IRON BINDING TEST: CPT

## 2024-01-10 PROCEDURE — 96372 THER/PROPH/DIAG INJ SC/IM: CPT | Performed by: NURSE PRACTITIONER

## 2024-01-10 RX ORDER — CALCIUM CARBONATE/VITAMIN D3 600 MG-10
1 TABLET ORAL 2 TIMES DAILY WITH MEALS
Qty: 180 TABLET | Refills: 3 | Status: SHIPPED | OUTPATIENT
Start: 2024-01-10

## 2024-01-10 RX ORDER — MAGNESIUM OXIDE 400 MG/1
400 TABLET ORAL 2 TIMES DAILY
Qty: 180 TABLET | Refills: 3 | Status: SHIPPED | OUTPATIENT
Start: 2024-01-10 | End: 2024-01-11

## 2024-01-10 RX ADMIN — DARBEPOETIN ALFA 40 MCG: 40 INJECTION, SOLUTION INTRAVENOUS; SUBCUTANEOUS at 13:41

## 2024-01-10 ASSESSMENT — PAIN SCALES - GENERAL: PAINLEVEL: NO PAIN (0)

## 2024-01-10 NOTE — Clinical Note
Goal Outcome Evaluation:      Maintaining contact isolation. Continuing IV antibiotic- Avycaz. Pt working with PT/OT. Sat up in the chair this AM. Unna boots applied this AM per physical therapy to lower extremities. Needs encouragement with activity.                                          R-Band utilized for closure of site.

## 2024-01-10 NOTE — PROGRESS NOTES
Patient presents to the [facility] for Aranesp injection.  Order written by Arlin Guajardo NP was completed today. Name and  verified with patient. See MAR for medication details. Medication was divided into 1 syringes by pharmacy and injected subcutaneously to LLQ of abdomen. Patient tolerated injection well and was discharged to home. Patient to return back after his trip out of town. Message sent to scheduling to reach out about future appt.     Administrations This Visit       darbepoetin stefano-polysorbate (ARANESP) injection 40 mcg       Admin Date  01/10/2024 Action  $Given Dose  40 mcg Route  Subcutaneous Documented By  Yoly Cline, RN                  BP (!) 150/78 (BP Location: Right arm, Patient Position: Sitting, Cuff Size: Adult Regular)   Pulse 91   Temp 97.7  F (36.5  C) (Oral)   Resp 16   Wt 81.8 kg (180 lb 4.8 oz)   SpO2 98%   BMI 28.24 kg/m      Yoly Cline, RN

## 2024-01-10 NOTE — NURSING NOTE
Chief Complaint   Patient presents with    Blood Draw     Labs drawn via  by Vascular Access Team in lab     Labs collected from venipuncture by Vascular Access Team. Vitals taken. Checked in for appointment(s).     Maribell Yañez RN

## 2024-01-11 ENCOUNTER — PATIENT OUTREACH (OUTPATIENT)
Dept: CARE COORDINATION | Facility: CLINIC | Age: 69
End: 2024-01-11
Payer: COMMERCIAL

## 2024-01-11 DIAGNOSIS — Z94.1 HEART REPLACED BY TRANSPLANT (H): ICD-10-CM

## 2024-01-11 RX ORDER — MAGNESIUM OXIDE 400 MG/1
400 TABLET ORAL 2 TIMES DAILY
Qty: 180 TABLET | Refills: 3 | Status: SHIPPED | OUTPATIENT
Start: 2024-01-11

## 2024-01-11 NOTE — PROGRESS NOTES
Anemia Management Note  SUBJECTIVE/OBJECTIVE:  Referred by Arlin Guajardo NP  on 2023  Primary Diagnosis: Anemia in Chronic Kidney Disease (N18.4, D63.1)     Secondary Diagnosis:  Other-Heart Transplant (Z94.1)  Transplant [id number]  Hgb goal range:  9-10  Epo/Darbo: Aranesp  40 mcg  every two weeks  In clinic  Iron regimen:  Ferrous Sulfate  once daily  Labs : 647124  Recent DANDY use, transfusion, IV iron: none  RX/TX plans : 548327  No history of stroke, MI, or cancers. Hx venous thrombus. Previously anticoagulated.  Contact:            Ok to leave message regarding Scheduling, billing and medical information per consent to communicate dated 310                              OK to speak with children Elisabeth Rivers and Lucian Oliveira Jr. regarding Scheduling, billing and medical information per consent to communicate dated 310        Latest Ref Rng & Units 2023     1:00 PM 2023    10:13 AM 2023     1:00 PM 2023    11:23 AM 2023     2:00 PM 1/10/2024     1:00 PM 1/10/2024     4:00 PM   Anemia   DANDY Dose  40mcg  40mcg  40mcg  40mcg   Hemoglobin 13.3 - 17.7 g/dL  8.0   8.1   9.1     Ferritin 31 - 409 ng/mL  304     277       BP Readings from Last 3 Encounters:   01/10/24 (!) 150/78   23 (!) 147/61   23 (!) 143/77     Wt Readings from Last 2 Encounters:   01/10/24 81.8 kg (180 lb 4.8 oz)   23 81.7 kg (180 lb 3.2 oz)         ASSESSMENT:  Hgb:at goal - received dose in clinic - recommend continue current regimen  TSat: at goal >30% Ferritin: At goal (>100ng/mL)    PLAN:  Dose with Aranesp. Lucian is traveling for a couple of months to see his daughter. Next Aranesp appt is scheduled for 24 Lucian will call Anemia Services if he is back in town sooner.     Orders needed to be renewed (for next follow-up date) in EPIC: None    Iron labs due:  2024    Plan discussed with:  Lucian      NEXT FOLLOW-UP DATE:  24    Gabrielle Hernandez RN   Anemia  [facility]  [email or web address]   Office : [phone]  Fax: [phone]

## 2024-01-11 NOTE — PROGRESS NOTES
1/11/2024  PATIENT LAB/IMAGING STATUS : No pending lab orders   Patient is showing 4/5 MNCM met. A1c not in range   Outcome for 01/11/24 1:50 PM: [place] message sent  Anamaria Roa CMA

## 2024-01-15 ENCOUNTER — OFFICE VISIT (OUTPATIENT)
Dept: NEPHROLOGY | Facility: CLINIC | Age: 69
End: 2024-01-15
Payer: COMMERCIAL

## 2024-01-15 VITALS
OXYGEN SATURATION: 98 % | BODY MASS INDEX: 29.99 KG/M2 | HEIGHT: 65 IN | DIASTOLIC BLOOD PRESSURE: 79 MMHG | HEART RATE: 101 BPM | SYSTOLIC BLOOD PRESSURE: 146 MMHG | WEIGHT: 180 LBS

## 2024-01-15 DIAGNOSIS — N18.32 ANEMIA DUE TO STAGE 3B CHRONIC KIDNEY DISEASE (H): ICD-10-CM

## 2024-01-15 DIAGNOSIS — N18.32 STAGE 3B CHRONIC KIDNEY DISEASE (CKD) (H): Primary | ICD-10-CM

## 2024-01-15 DIAGNOSIS — Z94.1 HEART REPLACED BY TRANSPLANT (H): ICD-10-CM

## 2024-01-15 DIAGNOSIS — D63.1 ANEMIA DUE TO STAGE 3B CHRONIC KIDNEY DISEASE (H): ICD-10-CM

## 2024-01-15 DIAGNOSIS — R60.9 EDEMA, UNSPECIFIED TYPE: ICD-10-CM

## 2024-01-15 PROCEDURE — 99214 OFFICE O/P EST MOD 30 MIN: CPT | Performed by: PHYSICIAN ASSISTANT

## 2024-01-15 PROCEDURE — 80069 RENAL FUNCTION PANEL: CPT | Performed by: PHYSICIAN ASSISTANT

## 2024-01-15 PROCEDURE — 82306 VITAMIN D 25 HYDROXY: CPT | Performed by: PHYSICIAN ASSISTANT

## 2024-01-15 PROCEDURE — 36415 COLL VENOUS BLD VENIPUNCTURE: CPT | Performed by: PHYSICIAN ASSISTANT

## 2024-01-15 PROCEDURE — 83970 ASSAY OF PARATHORMONE: CPT | Performed by: PHYSICIAN ASSISTANT

## 2024-01-15 RX ORDER — LISINOPRIL 10 MG/1
10 TABLET ORAL DAILY
COMMUNITY
End: 2024-04-05

## 2024-01-15 RX ORDER — FUROSEMIDE 20 MG
20 TABLET ORAL DAILY
COMMUNITY
End: 2024-08-13

## 2024-01-15 NOTE — PATIENT INSTRUCTIONS
Labs today, will call with results and recommendations.   Avoid ibuprofen/aleve.   Hydrate well with water and follow low sodium diet.   Labs and follow up in 3 months with Hiral Huffman.

## 2024-01-15 NOTE — PROGRESS NOTES
Nephrology Progress Note  1/15/2024    Assessment and Plan:  68 year old male with history of CAD s/p CABG with ICM s/p OHT in 2020, CKD with baseline Scr 1.2-1.7 before transplant, up to 2.2 since Fall 2022, diabetes for 20 years, who presents for evaluation.  His Scr is near 2.2, up from 1.7 in Fall 2022.    # CKD:  He has long standing CKD, baseline 1.2-1.7 before transplant, and was relatively stable, on combination of tacrolimus and sirolimus but up to 2.2 in recent monts. He was started on lisinopril in October 2022 for hypertension which may have a hemodynamic effect to explain this  - he has had proteinuria, up to 1800mg/g albumin in 2014, which improved and now has low grade albuminuria.  - sirolimus can cause proteinuria  - on lisinopril and furosemide  - consider SGLT2 inhbitor in future if GFR >20- will discuss with heart failure team and endocrine team  - check renal panel    # Hypertension: blood pressure near goal in the 130s at home, 146/79 in clinic today  - on lisinopril 10 mg once a day and furosemide 20 mg once a day.   - BP's 130s/ at home, was 146/79 at home.     # Anemia in chronic renal disease:   - Hgb: 9.1 recently, up from 8.1 in 12/2023.   - Iron studies: 1/10/24 iron sat 30%  check iron sat and B12  every 6 months    # Electrolytes:   - Potassium; level: High normal- labs pending today  - Bicarbonate; level: High normal pending today.   - repeat renal panel today    # Mineral Bone Disorder:   - 5/3/23 Vitamin D; level is:38 Normal, PTH 40  - 11/16/23 corrected calcium; level is: 8.82 Normal  - recheck vit D and PTH     #Disposition: labs and follow up in 3 months with me.     Chief Complaint:  Lucian Oliveira is here for follow up for CKD management.    HPI:  He is a very pleasant male with significant cardiac history, with MI and ICM, s/p OHT in July 2020, CKD predating transplant with baseline 1.2-1.7 before and now up to 2.2 range.  He has been doing fairly well from transplant  perspective and his medications are stable.  He was started on lisinopril for his blood pressure in Fall 2022.  He is on trulicity for diabetes and is trying to eat heathy and exercise  He is accompanied by his wife, who speaks a little bit of English.    He denies shortness of breath, he has some swelling which is manageable.  He takes his medications and has no major concerns.  He denies family history of kidney failure, but does have family history of diabetes.  He has diabetic retinopathy.      Today:   He feels better today. He has no n/v/d. Recently hospitalized with Hgb , 7, received transfusion.   Prior to this he was hospitalized with diarrhea and DAYA. Scr on admission was 5.77, He did not require dialysis. Most recent Scr was 1.8.   His BP at home has been near 130s  He has no further dizziness.  He is leaving town later this week to go to [place] to visit his daughter for 2 months.     Renal History:   Kidney Disease and Medical Hx:  h/o HTN: Yes   and h/o DM: Yes     ROS:   A comprehensive review of systems was obtained and negative, except as noted in the HPI or PMH.    Active Medical Problems:  Patient Active Problem List   Diagnosis    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)    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)    Hyperlipidemia LDL goal <100    Hypertension goal BP (blood pressure) < 140/90    CHF (NYHA class II, ACC/AHA stage C) (H)    CKD (chronic kidney disease) stage 3, GFR 30-59 ml/min (H)    Health Care Home    Automatic implantable cardioverter-defibrillator - [employer] single lead ICD, Not Dependent    Chronic systolic heart failure (H)    Proteinuria    Vitamin D deficiency    OA (osteoarthritis) of knee    Chondromalacia of patella, unspecified laterality    Pain in joint of left shoulder    Tinnitus    Ptosis of right eyelid    Secondary renal hyperparathyroidism (H24)    Cortical cataract of  both eyes    Moderate nonproliferative diabetic retinopathy, without macular edema, associated with type 2 diabetes mellitus    CHANTEL (obstructive sleep apnea)- severe (AHI 30)    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, unspecified whether long term insulin use (H)    Nuclear cataract, nonsenile    Coronary artery disease involving native coronary artery of native heart without angina pectoris    Status post coronary angiogram    Dental caries    Heart transplant, orthotopic, status (H)    Heart replaced by transplant (H)    Sepsis (H)    Need for prophylactic antibiotic    Traction detachment of left retina    Immunodeficiency, unspecified (H24)    CKD (chronic kidney disease) stage 4, GFR 15-29 ml/min (H)    HIT (heparin-induced thrombocytopenia) (H24)    Atrial flutter, unspecified type (H)    Acute embolism and thrombosis of right axillary vein (H)    Nuclear senile cataract of right eye    Lipid screening    Prostate cancer screening    Type 2 diabetes mellitus with diabetic nephropathy, with long-term current use of insulin (H)    Acute renal failure superimposed on chronic kidney disease, unspecified acute renal failure type, unspecified CKD stage  (H24)    Anemia of chronic renal failure, stage 4 (severe) (H)    Anemia, unspecified type     PMH:   Medical record was reviewed and PMH was discussed with patient and noted below.  Past Medical History:   Diagnosis Date    CAD (coronary artery disease)     CHF (congestive heart failure) (H)     CKD (chronic kidney disease), stage III (H)     Cortical cataract of both eyes     Diabetes (H)     Hyperlipidemia     Hypertension     Infection due to Streptococcus mitis group 09/23/2020    Ischemic cardiomyopathy     Obesity     CHANTEL (obstructive sleep apnea)     occas cpap    CHANTEL (obstructive sleep apnea)- severe (AHI 30)     Osteoarthritis      PSH:   Past Surgical History:   Procedure Laterality Date    CATARACT IOL,  RT/LT      COLONOSCOPY N/A 8/7/2019    Procedure: COLONOSCOPY, WITH POLYPECTOMY AND BIOPSY;  Surgeon: Chauncey Morataya MD;  [facility]    COLONOSCOPY N/A 5/12/2023    Procedure: Colonoscopy;  Surgeon: Mariano Velasquez MD;  [facility]    CV ANGIOGRAM CORONARY GRAFT N/A 7/2/2020    Procedure: Angiogram Coronary Graft;  Surgeon: Alex Lantigua MD;  [facility]    CV CORONARY ANGIOGRAM N/A 7/2/2020    Procedure: CV CORONARY ANGIOGRAM;  Surgeon: Alex Lantigua MD;  [facility]    CV CORONARY ANGIOGRAM N/A 7/20/2021    Procedure: CV CORONARY ANGIOGRAM;  Surgeon: Raimundo Hudson MD;  Location:  [facility]    CV CORONARY ANGIOGRAM N/A 9/12/2022    Procedure: Coronary Angiogram- BIPLANE;  Surgeon: Raimundo Hudson MD;  [facility]    CV CORONARY ANGIOGRAM N/A 7/17/2023    Procedure: Coronary Angiogram;  Surgeon: Alex Lantigua MD;  [facility]    CV HEART BIOPSY N/A 7/27/2020    Procedure: Heart Biopsy;  Surgeon: Mario Burr MD;  [facility]    CV HEART BIOPSY N/A 8/3/2020    Procedure: CV HEART BIOPSY;  Surgeon: Alex Lantigua MD;  [facility]    CV HEART BIOPSY N/A 8/10/2020    Procedure: CV HEART BIOPSY;  Surgeon: Mario Burr MD;  [facility]    CV HEART BIOPSY N/A 8/17/2020    Procedure: CV HEART BIOPSY;  Surgeon: Raimundo Hudson MD;  [facility]    CV HEART BIOPSY N/A 8/31/2020    Procedure: CV HEART BIOPSY;  Surgeon: Moises Santos MD;  [facility]    CV HEART BIOPSY N/A 9/14/2020    Procedure: CV HEART BIOPSY;  Surgeon: Raimundo Hudson MD;  Location:  [facility]    CV HEART BIOPSY N/A 9/28/2020    Procedure: CV HEART BIOPSY;  Surgeon: Tristan  Raimundo Padgett MD;  Location:  [facility]    CV HEART BIOPSY N/A 10/12/2020    Procedure: CV HEART BIOPSY;  Surgeon: John Stuart MD;  [facility]    CV HEART BIOPSY N/A 11/10/2020    Procedure: CV HEART BIOPSY;  Surgeon: John Stuart MD;  Location:  [facility]    CV HEART BIOPSY N/A 12/7/2020    Procedure: CV HEART BIOPSY;  Surgeon: Raimundo Hudson MD;  Location:  [facility]    CV HEART BIOPSY N/A 1/5/2021    Procedure: CV HEART BIOPSY;  Surgeon: Raimundo Hudson MD;  Location:  [facility]    CV HEART BIOPSY N/A 7/20/2021    Procedure: CV HEART BIOPSY;  Surgeon: Raimundo Hudson MD;  Location:  [facility]    CV HEART BIOPSY N/A 9/28/2021    Procedure: CV HEART BIOPSY;  Surgeon: Raimundo Hudson MD;  Location:  [facility]    CV HEART BIOPSY N/A 9/12/2022    Procedure: Heart Biopsy;  Surgeon: Raimundo Hudson MD;  Location:  [facility]    CV HEART BIOPSY N/A 7/17/2023    Procedure: Heart Biopsy;  Surgeon: Alex Lantigua MD;  Location:  [facility]    CV INTRA AORTIC BALLOON N/A 7/14/2020    Procedure: RHC WITH LEAVE IN SWAN/IABP;  Surgeon: Sonny Saleh MD;  Location:  [facility]    CV INTRAVASULAR ULTRASOUND N/A 9/12/2022    Procedure: Intravascular Ultrasound;  Surgeon: Raimundo Hudson MD;  Location:  [facility]    CV RIGHT HEART CATH MEASUREMENTS RECORDED N/A 3/25/2019    Procedure: CV RIGHT HEART CATH;  Surgeon: Moises Santos MD;  Location:  [facility]    CV RIGHT HEART CATH MEASUREMENTS RECORDED N/A 7/10/2019    Procedure: CV RIGHT HEART CATH;  Surgeon: Jak Mccabe MD;  Location:  [facility]    CV RIGHT HEART CATH MEASUREMENTS RECORDED N/A 7/8/2020    Procedure: Right Heart Cath with Leave In [place] already  has ICU load;  Surgeon: Jak Mccabe MD;  [facility]    CV RIGHT HEART CATH MEASUREMENTS RECORDED N/A 7/14/2020    Procedure: CV RIGHT HEART CATH;  Surgeon: Sonny Saleh MD;  Location:  [facility]    CV RIGHT HEART CATH MEASUREMENTS RECORDED N/A 7/2/2020    Procedure: CV RIGHT HEART CATH;  Surgeon: Alex Lantigua MD;  Location:  [facility]    CV RIGHT HEART CATH MEASUREMENTS RECORDED N/A 7/27/2020    Procedure: Right Heart Cath;  Surgeon: Mario Burr MD;  Location:  [facility]    CV RIGHT HEART CATH MEASUREMENTS RECORDED N/A 8/3/2020    Procedure: Right Heart Cath;  Surgeon: Alex Lantigua MD;  Location:  [facility]    CV RIGHT HEART CATH MEASUREMENTS RECORDED N/A 8/10/2020    Procedure: CV RIGHT HEART CATH;  Surgeon: Mario Burr MD;  Location:  [facility]    CV RIGHT HEART CATH MEASUREMENTS RECORDED N/A 8/17/2020    Procedure: CV RIGHT HEART CATH;  Surgeon: Raimundo Hudson MD;  Location:  [facility]    CV RIGHT HEART CATH MEASUREMENTS RECORDED N/A 8/31/2020    Procedure: CV RIGHT HEART CATH;  Surgeon: Moises Santos MD;  Location:  [facility]    CV RIGHT HEART CATH MEASUREMENTS RECORDED N/A 9/14/2020    Procedure: CV RIGHT HEART CATH;  Surgeon: Raimundo Hudson MD;  Location:  [facility]    CV RIGHT HEART CATH MEASUREMENTS RECORDED N/A 9/28/2020    Procedure: CV RIGHT HEART CATH;  Surgeon: Raimundo Hudson MD;  Location:  [facility]    CV RIGHT HEART CATH MEASUREMENTS RECORDED N/A 10/12/2020    Procedure: CV RIGHT HEART CATH;  Surgeon: John Stuart MD;  Location:  [facility]    CV RIGHT HEART CATH MEASUREMENTS RECORDED N/A 11/10/2020    Procedure: CV RIGHT HEART CATH;  Surgeon: John Stuart MD;  [facility]    CV RIGHT HEART CATH  MEASUREMENTS RECORDED N/A 12/7/2020    Procedure: CV RIGHT HEART CATH;  Surgeon: Raimundo Hudson MD;  Location:  [facility]    CV RIGHT HEART CATH MEASUREMENTS RECORDED N/A 1/5/2021    Procedure: CV RIGHT HEART CATH;  Surgeon: Raimundo Hudson MD;  Location:  [facility]    CV RIGHT HEART CATH MEASUREMENTS RECORDED N/A 7/20/2021    Procedure: CV RIGHT HEART CATH;  Surgeon: Raimundo Hudson MD;  Location:  [facility]    CV RIGHT HEART CATH MEASUREMENTS RECORDED N/A 9/28/2021    Procedure: CV RIGHT HEART CATH;  Surgeon: Raimundo Hudson MD;  Location:  [facility]    CV RIGHT HEART CATH MEASUREMENTS RECORDED N/A 9/12/2022    Procedure: Right Heart Catheterization;  Surgeon: Raimundo Hudson MD;  Location:  [facility]    CV RIGHT HEART CATH MEASUREMENTS RECORDED N/A 7/17/2023    Procedure: Right Heart Catheterization;  Surgeon: Alex Lantigua MD;  Location:  [facility]    EXTRACTION(S) DENTAL Left 7/13/2020    Procedure: EXTRACTION, TOOTH #11, 12, 13, 15, and 29;  Surgeon: Monica Chao DDS;  [facility]    IMPLANT AUTOMATIC IMPLANTABLE CARDIOVERTER DEFIBRILLATOR      INCISION AND DRAINAGE STERNUM W/ IRRIGATION SYSTEM, COMBINED N/A 9/23/2020    Procedure: INCISION AND DRAINAGE, LEFT SUPRACLAVICULAR WOUND INFECTION AND ABDOMENN WOUND.;  Surgeon: Ran Huertas MD;  [facility]    INSERT INTRAAORTIC BALLOON PUMP N/A 7/16/2020    Procedure: Subclavian Intra-Aortic Balloon Pump Placement;  Surgeon: Allan Sparrow MD;  [facility]    IRRIGATION AND DEBRIDEMENT CHEST WASHOUT, COMBINED N/A 9/28/2020    Procedure: IRRIGATION AND DEBRIDEMENT OF LEFT UPPER CHEST WOUND.;  Surgeon: Ran Huertas MD;  [facility]    PHACOEMULSIFICATION CLEAR CORNEA WITH STANDARD INTRAOCULAR LENS IMPLANT Right 2/6/2023    Procedure: RIGHT EYE PHACOEMULSIFICATION,  CATARACT, WITH INTRAOCULAR LENS IMPLANT with trypan blue;  Surgeon: Triny Bunch MD;  [facility]    PHACOEMULSIFICATION CLEAR CORNEA WITH STANDARD IOL, VITRECTOMY PARSPLANA 25 GAGUE, COMBINED Left 12/10/2019    Procedure: PHACOEMULSIFICATION, CATARACT, CLEAR CORNEAL INCISION APPROACH, W STD INTRAOCULAR LENS IMPLANT INSERT + VITRECTOMY BY PARS PLANA  USING 25-GAUGE INSTRUMENTS. ENDOLASER, INFUSION OF 20% SF6 GAS;  Surgeon: Triny Bunch MD;  [facility]    PICC DOUBLE LUMEN PLACEMENT Left 07/28/2020    5Fr - 42cm, Basilic vein, mid SVC    PICC INSERTION Right 07/11/2020    basilic 44 cm total     TRANSPLANT HEART RECIPIENT N/A 7/19/2020    Procedure: REDO MEDIAN STERNOTOMY, TRANSPLANT, ORTHOTOPIC HEART, RECIPIENT, ON PUMP OXYGENATOR, REMOVAL OF CARDIAC DEFIBRILLATOR AND LEAD;  Surgeon: Griselli, Massimo, MD;  [facility]    VITRECTOMY, PARS PLANA APPROACH, USING 27-GAUGE INSTRUMENTS Left 12/21/2020    Procedure: 27 gauge pars plana vitectomy, membrane peel, perfluoroctane liquid (PFO), retinectomy, endolaser, Silicone Oil 1000 cs;  Surgeon: Triny Bunch MD;  [facility]    ZZC CABG, ARTERY-VEIN, THREE  02/2008       Family Hx:   Family History   Problem Relation Age of Onset    Diabetes Sister     Diabetes Sister     Diabetes Brother     Macular Degeneration No family hx of     Glaucoma No family hx of     Myocardial Infarction No family hx of     Kidney Disease No family hx of     Anesthesia Reaction No family hx of     Bleeding Disorder No family hx of     Venous thrombosis No family hx of      Personal Hx:   Social History     Tobacco Use    Smoking status: Never    Smokeless tobacco: Never    Tobacco comments:     Never smoked; non-smoking household   Substance Use Topics    Alcohol use: No     Alcohol/week: 0.0 standard drinks of alcohol       Allergies:  Allergies   Allergen Reactions    Heparin Heparin Induced Thrombocytopenia     HIT screen sent 7/18/2020.  CORRINE confirmed positive       Medications:  Current Outpatient Medications   Medication Sig    acetaminophen (TYLENOL) 325 MG tablet Take 3 tablets (975 mg) by mouth every 8 hours as needed for mild pain    Alcohol Swabs PADS Use to swab the area of the injection or sergio as directed   Per insurance coverage    aspirin (ASA) 81 MG chewable tablet Take 1 tablet (81 mg) by mouth daily (Patient taking differently: Take 81 mg by mouth every morning)    blood glucose (NO BRAND SPECIFIED) test strip Use to test blood sugar 4 times daily or as directed.    blood glucose monitoring (ACCU-CHEK FELIPE SMARTVIEW) meter device kit Use to test blood sugar 3-4 times daily, as directed.    blood glucose monitoring (SOFTCLIX) lancets 1 each 4 times daily Use to test blood sugars 2 times daily.    calcium carbonate-vitamin D (CALTRATE) 600-10 MG-MCG per tablet TAKE ONE TABLET BY MOUTH TWICE A DAY WITH MEALS    ferrous sulfate (FEROSUL) 325 (65 Fe) MG tablet Take 1 tablet (325 mg) by mouth daily (with breakfast)    HUMALOG KWIKPEN 100 UNIT/ML soln INYECTE 5 A 20 UNIDADES DEBAJO  DE LA PIEL PAULETTE VECES AL LISA  ANTES DE LAS COMIDAS DOSIS  MAXIMA DIARIA: 60 UNIDADES    insulin NPH (HUMULIN N KWIKPEN) 100 UNIT/ML injection GIve 40 units each morning and 12 at night subcutaneous    insulin pen needle (32G X 4 MM) 32G X 4 MM miscellaneous Use 5-6 pen needles daily or as directed.    ketorolac (ACULAR) 0.5 % ophthalmic solution Place 1 drop into the right eye 2 times daily    latanoprost (XALATAN) 0.005 % ophthalmic solution Place 1 drop into both eyes at bedtime    magnesium oxide 400 MG tablet Take 1 tablet (400 mg) by mouth 2 times daily    mycophenolate (GENERIC EQUIVALENT) 500 MG tablet Take 3 tablets (1,500 mg) by mouth 2 times daily    omega-3 acid ethyl esters (LOVAZA) 1 g capsule Take 1 capsule (1 g) by mouth daily    prednisoLONE acetate (PRED FORTE) 1 % ophthalmic suspension Place 1 drop into the right eye 2 times daily     rosuvastatin (CRESTOR) 20 MG tablet Take 1 tablet (20 mg) by mouth daily    senna-docusate (SENOKOT-S/PERICOLACE) 8.6-50 MG tablet Take 1 tablet by mouth 2 times daily as needed (hold for loose stools)    sirolimus (GENERIC EQUIVALENT) 0.5 MG tablet Take 6 tablets (3 mg) by mouth daily    sulfamethoxazole-trimethoprim (BACTRIM) 400-80 MG tablet Take 1 tablet by mouth three times a week    tamsulosin (FLOMAX) 0.4 MG capsule TAKE ONE CAPSULE BY MOUTH ONCE DAILY    TRULICITY 3 MG/0.5ML SOPN INYECTE EL CONTENIDO DE IRVIN  PLUMA DEBAJO DE LA PIEL CADA  SEMANA DAINA SE INDICO     No current facility-administered medications for this visit.      Vitals:  There were no vitals taken for this visit.    Exam:  GENERAL APPEARANCE: alert and no distress  RESP: lungs clear to auscultation - no rales, rhonchi or wheezes  CV: regular rhythm, normal rate, no rub, no murmur  EDEMA: mild LE edema bilaterally  MS: extremities normal - no gross deformities noted, no evidence of inflammation in joints, no muscle tenderness  SKIN: no rash    LABS:   CMP  Recent Labs   Lab Test 11/17/23  0943 11/17/23  0942 11/17/23  0556 11/17/23  0408 11/16/23  2301 11/16/23  1914 11/08/23  0908 10/25/23  1005 10/18/23  1206 07/19/21  0950 05/11/21  0905 03/02/21  0818 01/14/21  0811 01/05/21  0816   NA  --   --   --   --   --  139 139 137 134*   < > 141 142 137 139   POTASSIUM  --   --   --   --   --  4.1 4.1 4.0 3.4   < > 4.0 4.2 4.8 4.3   CHLORIDE  --   --   --   --   --  105 108* 106 102   < > 107 108 106 107   CO2  --   --   --   --   --  22 21* 19* 20*   < > 27 27 28 25   ANIONGAP  --   --   --   --   --  12 10 12 12   < > 6 7 4 7   GLC 98 108* 90 86   < > 239* 165* 223* 221*   < > 98 110* 145* 137*   BUN  --   --   --   --   --  16.7 17.7 30.2* 52.0*   < > 34* 36* 28 28   CR  --   --   --   --   --  1.88* 1.81* 1.96* 2.19*   < > 1.84* 1.83* 1.69* 1.68*   GFRESTIMATED  --   --   --   --   --  38* 40* 36* 32*   < > 37* 37* 41* 41*   GFRESTBLACK  --    --   --   --   --   --   --   --   --   --  43* 43* 48* 48*   BRYANNA  --   --   --   --   --  8.5* 8.5* 8.5* 8.2*   < > 9.0 9.2 9.2 9.2    < > = values in this interval not displayed.     Recent Labs   Lab Test 11/16/23  1914 10/25/23  1005 10/11/23  1128 07/17/23  0908 02/02/23  1121   BILITOTAL 0.2 0.2 0.2 0.3 0.3   ALKPHOS 129 138* 124 122 128   ALT  --  13 20 17 19   AST  --  17 22 24 23     CBC  Recent Labs   Lab Test 01/10/24  1300 12/26/23  1123 12/11/23  1013 11/22/23  1002 11/17/23  1359 11/17/23  0556 11/16/23  1914 11/16/23  1022 11/08/23  1053 11/08/23  0908   HGB 9.1* 8.1* 8.0* 9.0*   < > 6.9*  6.9* 5.8* 6.4*   < > 6.9*   WBC  --   --   --   --   --  3.8* 4.2 3.4*  --  4.1   RBC  --   --   --   --   --  2.47* 2.08* 2.26*  --  2.50*   HCT 28.7* 25.2* 25.4* 27.9*  --  22.0* 18.5* 19.6*  --  21.1*   MCV  --   --   --   --   --  89 89 87  --  84   MCH  --   --   --   --   --  27.9 27.9 28.3  --  27.6   MCHC  --   --   --   --   --  31.4* 31.4* 32.7  --  32.7   RDW  --   --   --   --   --  15.8* 16.9* 16.4*  --  15.4*   PLT  --   --   --   --   --  139* 165 146*  --  150    < > = values in this interval not displayed.     URINE STUDIES  Recent Labs   Lab Test 10/12/23  0258 07/28/23  1226 09/28/21  0926 09/25/20  1241   COLOR Light Yellow Light Yellow Light Yellow Yellow   APPEARANCE Clear Clear Clear Clear   URINEGLC Negative 100* 50* 70*   URINEBILI Negative Negative Negative Negative   URINEKETONE Negative Negative Negative Negative   SG 1.015 1.014 1.016 1.018   UBLD Negative Negative Negative Negative   URINEPH 5.0 5.5 6.5 5.5   PROTEIN 20* Negative 10* 10*   NITRITE Negative Negative Negative Negative   LEUKEST Negative Negative Negative Negative   RBCU 0 <1 0 0   WBCU 3 <1 0 0     Recent Labs   Lab Test 03/14/22  1408 10/03/18  0725 07/26/16  1006   [facility] 0.29* 0.06 0.18     PTH  Recent Labs   Lab Test 05/03/23  0844 08/16/18  0834 07/26/16  0953   PTHI 40 180* 110*     IRON STUDIES  Recent Labs   Lab  Test 01/10/24  1300 12/11/23  1013 11/16/23  1022   IRON 63 49* 55*   * 209* 205*   IRONSAT 30 23 27   SEVERIANO 277 304 334       Hiral Huffman PA-C    Visit length 15 minutes. An additional 20 minutes were spent on date of service in chart review, documentation, and other activities as noted.

## 2024-01-16 ENCOUNTER — OFFICE VISIT (OUTPATIENT)
Dept: ENDOCRINOLOGY | Facility: CLINIC | Age: 69
End: 2024-01-16
Payer: COMMERCIAL

## 2024-01-16 ENCOUNTER — ALLIED HEALTH/NURSE VISIT (OUTPATIENT)
Dept: EDUCATION SERVICES | Facility: CLINIC | Age: 69
End: 2024-01-16
Attending: PHYSICIAN ASSISTANT
Payer: COMMERCIAL

## 2024-01-16 VITALS
HEIGHT: 66 IN | HEART RATE: 82 BPM | SYSTOLIC BLOOD PRESSURE: 154 MMHG | WEIGHT: 177 LBS | DIASTOLIC BLOOD PRESSURE: 74 MMHG | BODY MASS INDEX: 28.45 KG/M2 | OXYGEN SATURATION: 99 %

## 2024-01-16 DIAGNOSIS — I50.22 CHRONIC SYSTOLIC HEART FAILURE (H): ICD-10-CM

## 2024-01-16 DIAGNOSIS — I48.92 ATRIAL FLUTTER, UNSPECIFIED TYPE (H): ICD-10-CM

## 2024-01-16 DIAGNOSIS — E11.3513 TYPE 2 DIABETES MELLITUS WITH PROLIFERATIVE RETINOPATHY OF BOTH EYES AND MACULAR EDEMA, UNSPECIFIED WHETHER LONG TERM INSULIN USE (H): ICD-10-CM

## 2024-01-16 DIAGNOSIS — D84.9 IMMUNODEFICIENCY, UNSPECIFIED (H): ICD-10-CM

## 2024-01-16 DIAGNOSIS — N18.4 CKD (CHRONIC KIDNEY DISEASE) STAGE 4, GFR 15-29 ML/MIN (H): ICD-10-CM

## 2024-01-16 LAB
ALBUMIN SERPL BCG-MCNC: 3.6 G/DL (ref 3.5–5.2)
ANION GAP SERPL CALCULATED.3IONS-SCNC: 12 MMOL/L (ref 7–15)
BUN SERPL-MCNC: 22.1 MG/DL (ref 8–23)
CALCIUM SERPL-MCNC: 8.7 MG/DL (ref 8.8–10.2)
CHLORIDE SERPL-SCNC: 100 MMOL/L (ref 98–107)
CREAT SERPL-MCNC: 2.1 MG/DL (ref 0.67–1.17)
DEPRECATED HCO3 PLAS-SCNC: 26 MMOL/L (ref 22–29)
EGFRCR SERPLBLD CKD-EPI 2021: 33 ML/MIN/1.73M2
GLUCOSE SERPL-MCNC: 221 MG/DL (ref 70–99)
HBA1C MFR BLD: 6.9 %
PHOSPHATE SERPL-MCNC: 2.8 MG/DL (ref 2.5–4.5)
POTASSIUM SERPL-SCNC: 4.5 MMOL/L (ref 3.4–5.3)
PTH-INTACT SERPL-MCNC: 70 PG/ML (ref 15–65)
SODIUM SERPL-SCNC: 138 MMOL/L (ref 135–145)

## 2024-01-16 PROCEDURE — 83036 HEMOGLOBIN GLYCOSYLATED A1C: CPT | Performed by: PATHOLOGY

## 2024-01-16 PROCEDURE — 99214 OFFICE O/P EST MOD 30 MIN: CPT | Performed by: PHYSICIAN ASSISTANT

## 2024-01-16 PROCEDURE — G0108 DIAB MANAGE TRN  PER INDIV: HCPCS | Performed by: REGISTERED NURSE

## 2024-01-16 RX ORDER — INSULIN HUMAN 100 [IU]/ML
INJECTION, SUSPENSION SUBCUTANEOUS
Qty: 45 ML | Refills: 1 | Status: SHIPPED | OUTPATIENT
Start: 2024-01-16 | End: 2024-04-24

## 2024-01-16 RX ORDER — DULAGLUTIDE 4.5 MG/.5ML
4.5 INJECTION, SOLUTION SUBCUTANEOUS WEEKLY
Qty: 7 ML | Refills: 3 | Status: SHIPPED | OUTPATIENT
Start: 2024-01-16 | End: 2024-09-27

## 2024-01-16 RX ORDER — INSULIN LISPRO 100 [IU]/ML
5-35 INJECTION, SOLUTION INTRAVENOUS; SUBCUTANEOUS
Qty: 45 ML | Refills: 1 | Status: SHIPPED | OUTPATIENT
Start: 2024-01-16 | End: 2024-04-12

## 2024-01-16 ASSESSMENT — PAIN SCALES - GENERAL: PAINLEVEL: NO PAIN (0)

## 2024-01-16 NOTE — PATIENT INSTRUCTIONS
Siempre un gusto verles!    Sube Humulin a 44/14    De 5 humalog con desayuno.      Mis mejores aleksandra,,    Marisabel Prieto PA-C, MPAS  [facility]  Diabetes, Endocrinology, and Metabolism  [phone] Appointments/Nurse  [phone] Fax

## 2024-01-16 NOTE — PROGRESS NOTES
Diabetes Self-Management Education & Support    Lucian Oliveira presents today for education related to Type 2 diabetes    Patient is being treated with:  Oral Agents and MDI Insulin  He is accompanied by spouse, Shivani    Year of diagnosis: he thinks he has had diabetes for around 20 years.   Referring provider:  Marisabel Prieto PA-C  Living Situation: Lives with his wife, Shivani  Employment: Not employed.    Potential Barriers to Control:  poor literacy skills in English and Serbian.  Speaks and understands very limited English.  His wife, Shivani seems to be in charge of his diabetes management.      This visit was completed with the assistance of a  via phone.     PATIENT CONCERNS/REASON FOR REFERRAL Here at suggestion of Marisabel Prieto to discuss general diabetes management, but also to explore changing his insulin regimen from NPH insulin twice daily with rapid acting (RA) insulin at meals, to a long acting insulin with RA insulin at meals.     ASSESSMENT:    Taking Medication:     Current Diabetes Management per Patient:  Taking diabetes medications:    Shivani brings in his bottle of Jardiance, which it appears he is taking 10 mg daily.   Humulin N insulin:  40 units in AM and 12 units in PM  Humalog insulin:  He has a sliding scale that he follows which incorporates his meal coverage as well as a correction dose.    If pre-meal glucose is between 80 and 139, he takes a fixed dose of 15 units.   It increases by 2 units every 30 mg/dL over a target of 140.      Monitoring    Patient glucose self monitoring as follows: three times daily  BG meter: Accu-Chek Guide  BG results: all results are pre meal:     Pre Breakfast and Pre Lunch are generally between 122-150.  His glucose before dinner has been running between 250 and 372 mg/dL  When queried about the high pre-dinner readings, this is generally due to snacking in the afternoon.       Patient's most recent   Lab Results   Component Value  Date    A1C 8.7 2023    A1C 6.7 2021      Patient's A1C goal: <8.0    Activity: no regular exercise program    Healthy Eatin-9 AM:  Breakfast:  2-3 eggs with 2 tortillas, 1 banana, coffee with milk only, and Mexican sweet bread, which I understand is similar to a doughnut.    1-2 PM:  Lunch:  Meat, vegetables, 1 cup of cooked rice, and a couple of tortillas.  5-6 PM:  Soup and bread, or similar to lunch.  They describe this as a lighter meal.   Snacks:  He snacks between lunch and dinner,  often on nuts and some fruit, but often has a Mexican sweet bread (read doughnut).  I think this is this reason his pre-dinner readings are so elevated.      Problem Solving:      Patient is at risk of hypoglycemia?: YES and Frequency is less than once a month  Hospitalizations for hyper or hypoglycemia: No    EDUCATION and INSTRUCTION PROVIDED AT THIS VISIT:       Most of this visit was spent in information gathering.  We discussed the idea of changing his insulin regimen.    He and Shivani are about to go to [place] for 2 months to visit their daughter and get warm so right now is probably not the optimal time to make a change in his insulin regimen, given our inability to monitor distantly.  Suggested that on their return we have an appointment to discuss this change, and also explore other delivery modalities.  He noticed the Cequr device on my desk and was interested in whether or not this might work for him.  Explained that we can consider this once he returns from [place].      Discussed his high pre-dinner readings.  Suggest that he stick to nuts and fruit for a mid-afternoon snack, and limit his consumption of Mexican sweet bread to no more than once a day.  He agrees to this.  He would most likely benefit from a continuous glucose monitor (CGM) which would help him get a better handle on his glucoses and would assist his diabetes care team in decision making around his insulin regimen.       Patient-stated goal written and given to Lucian Oliveira.  Verbalized and demonstrated understanding of instructions.     PLAN:  See patient instructions  AVS printed and given to patient    FOLLOW-UP:    Appointment made for April 16 at 08:00am    Time spent with patient at today's visit was 60 minutes.      Any diabetes medication dose changes were made via the CDE Protocol and Collaborative Practice Agreement with [facility] and  [place].  A copy of this encounter was provided to patient's referring provider.

## 2024-01-16 NOTE — LETTER
1/16/2024       RE: Lucian Oliveira  [address] [facility] [address]     Dear Colleague,    Thank you for referring your patient, Lucian Oliveira, to the [facility] [place] at [facility]. Please see a copy of my visit note below.    1/11/2024  PATIENT LAB/IMAGING STATUS : No pending lab orders   Patient is showing 4/5 MNCM met. A1c not in range   Outcome for 01/11/24 1:50 PM: [employer] message sent  Anamaria Roa KUSUM Garcia and Shivani here in clinic today to follow-up his type 2 diabetes.  They report that he did have a lot of stomach .  problems he is feeling better now and they are planning to go to [place] for a couple of months to stay with their daughter and 5 grandchildren and he was recently hospitalized for anemia though his iron is coming back up now.  .  He notes that his blood sugar has been quite high.  He met with Priscilla cintron this morning and agreed to have just 1 large sweet bread per day generally with breakfast.  He has not had any low blood sugars since we last met.  They generally are having breakfast at 830 with eggs and meat banana and often some sort of bread either a tortilla toast I hashbrowns or a large Mexican bread.  They are not giving Humalog with breakfast even if his blood sugar in the morning is high  .  They then eat the largest meal around 2 PM and in the evening have coffee sometimes with a large Mexican bread but more often with a regular slice of bread a banana different Types of nuts, and on occasion a piece of fruit.      They have continued to give NPH 40 units around 8 AM and 12 units around 8 PM.  They are giving Humalog most often with lunch and dinner if blood sugars greater than 140 they have a sliding scale of 2 units per 30 greater than 140.  .      I do note that the are generally only checking blood sugar  with their evening meal and generally not with the midday meal.  The blood sugars are quite high before the evening meal choices fact they are not getting any Humalog at the midday meal as they are not checking the blood sugar to give a sliding scale.      Glucometer data reveals blood sugars 122-389.  Blood sugars in the morning are generally between 130 and 180 and later in the day between 200-300.      Past medical history social history reviewed.    He notes that he has not been walking as much now using the indoor equipment following his hospitalization in October.  He is looking forward to being in [place] and walking a lot.    Objective:  Pleasant Danish-speaking male in no acute distress.  Accompanied by his wife who is knowledgeable in the medication doses.  Mood is good affect is appropriate respirations are easy and unlabored.  There is significant edema in the feet and up to two thirds of the calf.  Pitting 3+.  The skin on the feet is good condition, good capillary refill monofilament sensation is intact.  No lesions.    Assessment and plan:  Type 2 diabetes uncontrolled A1c today is 6.9 but patient recently had to have a transfusion.  Reported blood sugars are all above goal without any hypoglycemia.    We will increase his NPH dose from 40 in the morning and 12 in the evening to 44 and 14.      We discussed different forms of carbohydrate which she generally is having with breakfast and I am recommending he give 5 units of Humalog with breakfast as long as this does not lead to any low blood sugars.  I am asking them also to more consistently check the blood sugar before lunch and give Humalog when needed.      They will see diabetes education in April and were wondering if we might be able to transition to a more simple and effective regimen.  I will also see them after they return from [place].    36 minutes spent on the date of the encounter doing chart review, history and exam, documentation,  education and counseling, as well as communication and coordination of care, and further activities as noted above.  This time excludes time spent reviewing CGM.  It is my privilege to be involved in the care of the above patient.     Marisabel Prieto PA-C, MPAS  [facility]  Diabetes, Endocrinology, and Metabolism  [phone] Appointments/Nurse Line  [phone] Fax  [phone] pager  On [facility] (inpatient)

## 2024-01-16 NOTE — PROGRESS NOTES
Jose and Shivani here in clinic today to follow-up his type 2 diabetes.  They report that he did have a lot of stomach .  problems he is feeling better now and they are planning to go to [place] for a couple of months to stay with their daughter and 5 grandchildren and he was recently hospitalized for anemia though his iron is coming back up now.  .  He notes that his blood sugar has been quite high.  He met with Priscilla cintron this morning and agreed to have just 1 large sweet bread per day generally with breakfast.  He has not had any low blood sugars since we last met.  They generally are having breakfast at 830 with eggs and meat banana and often some sort of bread either a tortilla toast I hashbrowns or a large Mexican bread.  They are not giving Humalog with breakfast even if his blood sugar in the morning is high  .  They then eat the largest meal around 2 PM and in the evening have coffee sometimes with a large Mexican bread but more often with a regular slice of bread a banana different Types of nuts, and on occasion a piece of fruit.  He has a good appetite.      They have continued to give NPH 40 units around 8 AM and 12 units around 8 PM.  They are giving Humalog most often with lunch and dinner if blood sugars greater than 140 they have a sliding scale of 2 units per 30 greater than 140.  .      I do note that the are generally only checking blood sugar with their evening meal and generally not with the midday meal.  The blood sugars are quite high before the evening meal choices fact they are not getting any Humalog at the midday meal as they are not checking the blood sugar to give a sliding scale.      Glucometer data reveals blood sugars 122-389.  Blood sugars in the morning are generally between 130 and 180 and later in the day between 200-300.      Past medical history social history reviewed.    He notes that he has not been walking as much now using the indoor equipment following his  hospitalization in October.  He is looking forward to being in [place] and walking a lot.    Objective:  Pleasant Sinhala-speaking male in no acute distress.  Accompanied by his wife who is knowledgeable in the medication doses.  Mood is good affect is appropriate respirations are easy and unlabored.  There is significant edema in the feet and up to two thirds of the calf.  Pitting 3+.  The skin on the feet is good condition, good capillary refill monofilament sensation is intact.  No lesions.    Assessment and plan:  Type 2 diabetes uncontrolled A1c today is 6.9 but patient recently had to have a transfusion.  Reported blood sugars are all above goal without any hypoglycemia.    We will increase his NPH dose from 40 in the morning and 12 in the evening to 44 and 14.  We also will increase Trulicity from 3 mg weekly to 4.5 mg weekly.      We discussed different forms of carbohydrate which she generally is having with breakfast and I am recommending he give 5 units of Humalog with breakfast as long as this does not lead to any low blood sugars.  I am asking them also to more consistently check the blood sugar before lunch and give Humalog when needed.      They will see diabetes education in April and were wondering if we might be able to transition to a more simple and effective regimen.  I will also see them after they return from [place].    36 minutes spent on the date of the encounter doing chart review, history and exam, documentation, education and counseling, as well as communication and coordination of care, and further activities as noted above.  This time excludes time spent reviewing CGM.  It is my privilege to be involved in the care of the above patient.     Marisabel Prieto PA-C, MPAS  [facility]  Diabetes, Endocrinology, and Metabolism  [phone] Appointments/Nurse Line  [phone] Fax  [phone] pager  On VOCERA (inpatient)

## 2024-01-17 LAB — VIT D+METAB SERPL-MCNC: 25 NG/ML (ref 20–50)

## 2024-01-24 ENCOUNTER — TELEPHONE (OUTPATIENT)
Dept: ENDOCRINOLOGY | Facility: CLINIC | Age: 69
End: 2024-01-24
Payer: COMMERCIAL

## 2024-01-30 ENCOUNTER — PATIENT OUTREACH (OUTPATIENT)
Dept: NEPHROLOGY | Facility: CLINIC | Age: 69
End: 2024-01-30
Payer: COMMERCIAL

## 2024-01-30 NOTE — LETTER
Discharge reviewed with patient and his fiance. Pain continues 2/10 and is manageable. He will f/u as needed.    January 30, 2024      Lucian Oliveira  [address] [facility] [address]        Dear ,    We are writing to inform you of your test results.     Labs are stable. No changes at this time. Follow up as planned.     Thanks  Hiral Huffman PA-C    Last Renal Panel:  Sodium   Date Value Ref Range Status   01/15/2024 138 135 - 145 mmol/L Final     Comment:     Reference intervals for this test were updated on 09/26/2023 to more accurately reflect our healthy population. There may be differences in the flagging of prior results with similar values performed with this method. Interpretation of those prior results can be made in the context of the updated reference intervals.    05/11/2021 141 133 - 144 mmol/L Final     Potassium   Date Value Ref Range Status   01/15/2024 4.5 3.4 - 5.3 mmol/L Final   03/14/2022 4.4 3.4 - 5.3 mmol/L Final   05/11/2021 4.0 3.4 - 5.3 mmol/L Final     Chloride   Date Value Ref Range Status   01/15/2024 100 98 - 107 mmol/L Final   03/14/2022 109 94 - 109 mmol/L Final   05/11/2021 107 94 - 109 mmol/L Final     Carbon Dioxide   Date Value Ref Range Status   05/11/2021 27 20 - 32 mmol/L Final     Carbon Dioxide (CO2)   Date Value Ref Range Status   01/15/2024 26 22 - 29 mmol/L Final   03/14/2022 26 20 - 32 mmol/L Final     Anion Gap   Date Value Ref Range Status   01/15/2024 12 7 - 15 mmol/L Final   03/14/2022 6 3 - 14 mmol/L Final   05/11/2021 6 3 - 14 mmol/L Final     Glucose   Date Value Ref Range Status   01/15/2024 221 (H) 70 - 99 mg/dL Final   03/14/2022 175 (H) 70 - 99 mg/dL Final   05/11/2021 98 70 - 99 mg/dL Final     GLUCOSE BY METER POCT   Date Value Ref Range Status   11/17/2023 98 70 - 99 mg/dL Final     Urea Nitrogen   Date Value Ref Range Status   01/15/2024 22.1 8.0 - 23.0 mg/dL Final   03/14/2022 29 7 - 30 mg/dL Final   05/11/2021 34 (H) 7 - 30 mg/dL Final     Creatinine   Date Value Ref Range Status   01/15/2024 2.10 (H) 0.67 - 1.17 mg/dL Final    05/11/2021 1.84 (H) 0.66 - 1.25 mg/dL Final     GFR Estimate   Date Value Ref Range Status   01/15/2024 33 (L) >60 mL/min/1.73m2 Final   05/11/2021 37 (L) >60 mL/min/[1.73_m2] Final     Comment:     Non  GFR Calc  Starting 12/18/2018, serum creatinine based estimated GFR (eGFR) will be   calculated using the Chronic Kidney Disease Epidemiology Collaboration   (CKD-EPI) equation.       Calcium   Date Value Ref Range Status   01/15/2024 8.7 (L) 8.8 - 10.2 mg/dL Final   05/11/2021 9.0 8.5 - 10.1 mg/dL Final     Phosphorus   Date Value Ref Range Status   01/15/2024 2.8 2.5 - 4.5 mg/dL Final   05/11/2021 3.2 2.5 - 4.5 mg/dL Final     Albumin   Date Value Ref Range Status   01/15/2024 3.6 3.5 - 5.2 g/dL Final   03/14/2022 3.2 (L) 3.4 - 5.0 g/dL Final   05/11/2021 3.9 3.4 - 5.0 g/dL Final         If you have any questions or concerns, please call [phone].       Sincerely,    AURORA Hagen Care Coordinator  Nephrology

## 2024-01-30 NOTE — PROGRESS NOTES
Late entry:  Patient informed via mail result note from latriceka:  Please let him know the labs are stable. No changes at this time. Follow up as planned.     Thanks  Hiral Hagen, RN Care Coordinator  Nephrology

## 2024-03-05 DIAGNOSIS — E11.65 TYPE 2 DIABETES MELLITUS WITH HYPERGLYCEMIA, WITH LONG-TERM CURRENT USE OF INSULIN (H): ICD-10-CM

## 2024-03-05 DIAGNOSIS — E78.5 DYSLIPIDEMIA: ICD-10-CM

## 2024-03-05 DIAGNOSIS — I25.739 CORONARY ARTERY DISEASE INVOLVING NONAUTOLOGOUS BIOLOGICAL CORONARY BYPASS GRAFT WITH ANGINA PECTORIS (H): ICD-10-CM

## 2024-03-05 DIAGNOSIS — Z79.4 TYPE 2 DIABETES MELLITUS WITH HYPERGLYCEMIA, WITH LONG-TERM CURRENT USE OF INSULIN (H): ICD-10-CM

## 2024-03-06 NOTE — TELEPHONE ENCOUNTER
3/6/24, 2:42 PM EST    DISCHARGE PLANNING EVALUATION    SW received call from Jet with the [facility], they have discussed the additional information that was provided by Dr. Pruett yesterday and have declined accepting pt.  Stating they \"don't have the resources available should Trav have any medical issues arise like has had recently\".     Pt called using , and all testing/results reviewed with pt and wife.  Heart biopsy negative.  EBV/CMV negative.  Immuknow 281- mod immune cell response.  Echocardiogram normal- EF 55-60%  Tacrolimus level 5.7.  Goal 6-8. Pt to increase Tacro dose to 5mg BID and recheck in one week.  Appointment made for pt at [facility].  Pt and wife state understanding all instructions and plan of care.

## 2024-03-08 DIAGNOSIS — Z94.1 HEART TRANSPLANT, ORTHOTOPIC, STATUS (H): ICD-10-CM

## 2024-03-08 RX ORDER — ROSUVASTATIN CALCIUM 20 MG/1
20 TABLET, COATED ORAL DAILY
Qty: 90 TABLET | Refills: 3 | Status: SHIPPED | OUTPATIENT
Start: 2024-03-08 | End: 2024-05-24

## 2024-03-12 RX ORDER — OMEGA-3-ACID ETHYL ESTERS 1 G/1
1 CAPSULE, LIQUID FILLED ORAL DAILY
Qty: 100 CAPSULE | Refills: 2 | Status: SHIPPED | OUTPATIENT
Start: 2024-03-12 | End: 2024-03-29

## 2024-03-12 NOTE — TELEPHONE ENCOUNTER
omega-3 acid ethyl esters (LOVAZA) 1 g capsule 90 capsule 3 7/14/2023     Last Office Visit: 1/16/24  Future Office visit:   4/16/24    Antihyperlipidemic agents Passed     Paola Hauser RN  P Red Flag Triage/MRT

## 2024-03-16 ENCOUNTER — HEALTH MAINTENANCE LETTER (OUTPATIENT)
Age: 69
End: 2024-03-16

## 2024-03-18 ENCOUNTER — TELEPHONE (OUTPATIENT)
Dept: FAMILY MEDICINE | Facility: CLINIC | Age: 69
End: 2024-03-18
Payer: COMMERCIAL

## 2024-03-18 NOTE — LETTER
March 18, 2024    To  Lucian Oliveira  [address] [facility] [address]    Your team at [facility] cares about your health. We have reviewed your chart and based on our findings; we are making the following recommendations to better manage your health.     You are in particular need of attention regarding the following:     HYPERTENSION FOLLOW UP: Office Visit    Please call [phone] to schedule an appointment for your annual wellness and follow visit for your blood pressure.    If you have already completed these items, please contact the clinic via phone or   [employer] so your care team can review and update your records. Thank you for   choosing [facility] for your healthcare needs. For any questions,   concerns, or to schedule an appointment please contact our clinic.    Healthy Regards,      Your [facility] Care Team

## 2024-03-18 NOTE — TELEPHONE ENCOUNTER
Patient Quality Outreach    Patient is due for the following:   Hypertension -  Hypertension follow-up visit    Next Steps:   See below    Type of outreach:    Sent letter.    Next Steps:  Reach out within 90 days via  done .    Max number of attempts reached: Yes. Will try again in 90 days if patient still on fail list.    Questions for provider review:    None           Marisel Sharma CMA  Chart routed to complains of

## 2024-03-19 DIAGNOSIS — E11.3513 TYPE 2 DIABETES MELLITUS WITH PROLIFERATIVE RETINOPATHY OF BOTH EYES AND MACULAR EDEMA, UNSPECIFIED WHETHER LONG TERM INSULIN USE (H): Primary | ICD-10-CM

## 2024-03-26 DIAGNOSIS — E11.65 TYPE 2 DIABETES MELLITUS WITH HYPERGLYCEMIA, WITH LONG-TERM CURRENT USE OF INSULIN (H): ICD-10-CM

## 2024-03-26 DIAGNOSIS — I25.739 CORONARY ARTERY DISEASE INVOLVING NONAUTOLOGOUS BIOLOGICAL CORONARY BYPASS GRAFT WITH ANGINA PECTORIS (H): ICD-10-CM

## 2024-03-26 DIAGNOSIS — E78.5 DYSLIPIDEMIA: ICD-10-CM

## 2024-03-26 DIAGNOSIS — Z79.4 TYPE 2 DIABETES MELLITUS WITH HYPERGLYCEMIA, WITH LONG-TERM CURRENT USE OF INSULIN (H): ICD-10-CM

## 2024-03-28 ENCOUNTER — OFFICE VISIT (OUTPATIENT)
Dept: OPHTHALMOLOGY | Facility: CLINIC | Age: 69
End: 2024-03-28
Attending: OPHTHALMOLOGY
Payer: COMMERCIAL

## 2024-03-28 DIAGNOSIS — E11.3513 TYPE 2 DIABETES MELLITUS WITH PROLIFERATIVE RETINOPATHY OF BOTH EYES AND MACULAR EDEMA, UNSPECIFIED WHETHER LONG TERM INSULIN USE (H): Primary | ICD-10-CM

## 2024-03-28 PROCEDURE — 92134 CPTRZ OPH DX IMG PST SGM RTA: CPT | Performed by: OPHTHALMOLOGY

## 2024-03-28 PROCEDURE — 99213 OFFICE O/P EST LOW 20 MIN: CPT | Performed by: OPHTHALMOLOGY

## 2024-03-28 PROCEDURE — G0463 HOSPITAL OUTPT CLINIC VISIT: HCPCS | Performed by: OPHTHALMOLOGY

## 2024-03-28 ASSESSMENT — VISUAL ACUITY
OD_SC+: -1
METHOD: SNELLEN - LINEAR
OS_SC: HM
OD_SC: 20/40

## 2024-03-28 ASSESSMENT — EXTERNAL EXAM - LEFT EYE: OS_EXAM: NORMAL

## 2024-03-28 ASSESSMENT — SLIT LAMP EXAM - LIDS
COMMENTS: NORMAL
COMMENTS: NORMAL

## 2024-03-28 ASSESSMENT — TONOMETRY
OD_IOP_MMHG: 18
IOP_METHOD: TONOPEN
OS_IOP_MMHG: 18

## 2024-03-28 ASSESSMENT — EXTERNAL EXAM - RIGHT EYE: OD_EXAM: NORMAL

## 2024-03-28 ASSESSMENT — CUP TO DISC RATIO: OD_RATIO: 0.25

## 2024-03-28 NOTE — NURSING NOTE
Chief Complaints and History of Present Illnesses   Patient presents with    Follow Up     Borderline glaucoma of both eyes with ocular hypertension   proliferative diabetic retinopathy     Chief Complaint(s) and History of Present Illness(es)       Follow Up              Comments: Borderline glaucoma of both eyes with ocular hypertension   proliferative diabetic retinopathy              Comments    Pt states no change in VA since last visit  States no flashes or floaters  No eye pain or redness  Using :latanoprost at bedtime both eyes  LBS: 144 Last A1C: 6.9  Lab Results       Component                Value               Date                       A1C                      6.9                 01/16/2024                 A1C                      8.1                 11/07/2023                 A1C                      8.7                 07/17/2023                 A1C                      8.3                 02/02/2023                 A1C                      8.4                 09/12/2022                 A1C                      7.4                 03/14/2022                 A1C                      7.3                 11/17/2021                 A1C                      6.7                 01/14/2021                 A1C                      5.9                 12/07/2020                 A1C                      8.2                 07/03/2020                 A1C                      7.9                 07/08/2019                 A1C                      8.5                 03/11/2019                Kassidy Pat COT 8:26 AM March 28, 2024

## 2024-03-28 NOTE — PROGRESS NOTES
CC: follow up proliferative diabetic retinopathy    Interval:   Patient feels vision is stable each eye in the last several weeks. No flashes or floaters.  Ocular meds:   - Prednisolone daily left eye & ketorolac once daily in right eye only. Latanoprost both eyes once daily.     HPI: 68 year old man PMHx of CAD (s/p 3v CABG 2008), ischemic CM, now s/p orthotopic heart transplant 07/2020, CKD, HTN, HLD, obesity, CHANTEL and IDDM2 with history of PDR both eyes who presented to [facility] in 05/17/2019 with bilateral vitreous hemorrhages.      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 Good foveal contour  Left eye: Irregular retina; ERM; nasal to fovea with large bullous edema-largely stable    FA 10/26/23   right eye: PRP scars. Foci of NV superonasal.     optos consistent with exam 1/4/24    DVF 10/26/23   Right eye: H 110 V 75  Left eye: H only sup area of 50 degrees V70     Assessment and Plan  # history of Proliferative Diabetic Retinopathy, right eye    # with Diabetic macular edema right eye   Optical Coherence Tomography 10/26/23 with mild worsening of mild cystoid macular edema  Currently using   - prednisolone 1x daily left eye till finish   -Restart Ketorolac 1x daily right eye till finish  - Fluorescein angiography with persistent neovascularization elsewhere 10.26.23   Status post laser filling 10/26/23 no complications     Consider avastin if Diabetic macular edema worsen    # Vitreous hemorrhage, right eye -  Resolved  (onset 4/2021)   - Responded to multiple Avastin, nearly resolved 08/04/2022   - R/B/A Fill-in PRP right eye 08/24/2022: #764 spots   -  1.25.23 last Avastin right eye     # Proliferative Diabetic Retinopathy, left eye     - NVI, left eye 6/2/2022   - Gonio 6/2/2022 broad PAS superiorly ~3 clock  hours, narrow PAS inferiorly <1 clock hour, no NVA   - Regressing 08/04/2022   - s/p PRP 6/5/2019   - s/p avastin last injection 06/02/2022   - 03/09/23 VA stable CF2' - continue to observe given chronic Retinal detachment   # Funnel RD left eye   - progressed to funnel RD after loss to follow up given heart surgeries     - w retina folded on itself under SO   - guarded prognosis previously discussed   - Drops as below    # s/p CEIOL OD 2/6/23.   doing well.     # Ocuar HTN, both eyes    - 06/15/23 IOP 24/20.   -07/27/23 intraocular pressure under good control on latanoprost  03/28/24 18 both eyes     PLAN:   - Continue latanoprost at bedtime both eyes   - stop prednisolone left eye  - stop Ketorolac  Retina detachment precautions were discussed with the patient (presence or increased in flashes, floaters or a curtain in the visual field) and was asked to return if any of the those occur    RTC: 8-10 weeks with Optical Coherence Tomography with fluorescein angiography transits right eye   ~~~~~~~~~~~~~~~~~~~~~~~~~~~~~~~~~~   Complete documentation of historical and exam elements from today's encounter can be found in the full encounter summary report (not reduplicated in this progress note).  I personally obtained the chief complaint(s) and history of present illness.  I confirmed and edited as necessary the review of systems, past medical/surgical history, family history, social history, and examination findings as documented by others; and I examined the patient myself.  I personally reviewed the relevant tests, images, and reports as documented above.  I formulated and edited as necessary the assessment and plan and discussed the findings and management plan with the patient and family    Triny Bunch MD  Professor of Ophthalmology  Vitreo-Retinal surgeon   Department of Ophthalmology and Visual Neurosciences   [facility]  Phone: [phone]   Fax: [phone]

## 2024-03-29 RX ORDER — OMEGA-3-ACID ETHYL ESTERS 1 G/1
1 CAPSULE, LIQUID FILLED ORAL DAILY
Qty: 100 CAPSULE | Refills: 0 | Status: SHIPPED | OUTPATIENT
Start: 2024-03-29 | End: 2024-08-21

## 2024-03-29 NOTE — TELEPHONE ENCOUNTER
omega-3 acid ethyl esters (LOVAZA) 1 g capsule 100 capsule 2 3/12/2024  ----------------------  Last Office Visit : 1/16/2024  [facility] [place]     Marisabel Prieto PA-C     Future Office visit:    4/16/2024 Status: Carleen    Arrive By: 11:45 AM     Appointment Time: 12:00 PM Length: 30   Visit Type: RETURN DIABETES [[id number]] CECILY:     Provider: Marisabel Prieto PA-C Department: [facility] DIABETES     ----------------------    Refill decision: #90 days no refills given, LDL recheck July 2024      LDL Cholesterol Calculated   Date Value Ref Range Status   07/17/2023 26 <=100 mg/dL Final   01/05/2021 58 <100 mg/dL Final     Comment:     Desirable:       <100 mg/dl       Pass/Fail Protocol Criteria:  Antihyperlipidemic agents [id number]/29/2024 01:13 PM   Protocol Details LDL on file in the past 12 months    Medication is active on med list    Recent (12 mo) or future (90 days) visit within the authorizing provider's specialty    Patient is age 18 years or older

## 2024-03-29 NOTE — PROGRESS NOTES
03/29/24 11:14 AM  PATIENT LAB/IMAGING STATUS : No pending lab orders   Patient is showing 4/5 MNCM met. Not on statin   Outcome for 04/15/24 11:09 AM: Performablehart message sent  Anamaria Roa CMA

## 2024-03-31 RX ORDER — DIPHENHYDRAMINE HYDROCHLORIDE 50 MG/ML
50 INJECTION INTRAMUSCULAR; INTRAVENOUS
Start: 2024-03-31

## 2024-03-31 RX ORDER — EPINEPHRINE 1 MG/ML
0.3 INJECTION, SOLUTION INTRAMUSCULAR; SUBCUTANEOUS EVERY 5 MIN PRN
OUTPATIENT
Start: 2024-03-31

## 2024-03-31 RX ORDER — ALBUTEROL SULFATE 90 UG/1
1-2 AEROSOL, METERED RESPIRATORY (INHALATION)
Start: 2024-03-31

## 2024-03-31 RX ORDER — ALBUTEROL SULFATE 0.83 MG/ML
2.5 SOLUTION RESPIRATORY (INHALATION)
OUTPATIENT
Start: 2024-03-31

## 2024-03-31 RX ORDER — MEPERIDINE HYDROCHLORIDE 25 MG/ML
25 INJECTION INTRAMUSCULAR; INTRAVENOUS; SUBCUTANEOUS EVERY 30 MIN PRN
OUTPATIENT
Start: 2024-03-31

## 2024-03-31 RX ORDER — METHYLPREDNISOLONE SODIUM SUCCINATE 125 MG/2ML
125 INJECTION, POWDER, LYOPHILIZED, FOR SOLUTION INTRAMUSCULAR; INTRAVENOUS
Start: 2024-03-31

## 2024-04-01 ENCOUNTER — INFUSION THERAPY VISIT (OUTPATIENT)
Dept: INFUSION THERAPY | Facility: CLINIC | Age: 69
End: 2024-04-01
Attending: NURSE PRACTITIONER
Payer: COMMERCIAL

## 2024-04-01 ENCOUNTER — APPOINTMENT (OUTPATIENT)
Dept: LAB | Facility: CLINIC | Age: 69
End: 2024-04-01
Attending: NURSE PRACTITIONER
Payer: COMMERCIAL

## 2024-04-01 ENCOUNTER — PATIENT OUTREACH (OUTPATIENT)
Dept: CARE COORDINATION | Facility: CLINIC | Age: 69
End: 2024-04-01

## 2024-04-01 VITALS
RESPIRATION RATE: 16 BRPM | TEMPERATURE: 98.2 F | OXYGEN SATURATION: 99 % | WEIGHT: 174.1 LBS | BODY MASS INDEX: 28.31 KG/M2 | DIASTOLIC BLOOD PRESSURE: 72 MMHG | HEART RATE: 89 BPM | SYSTOLIC BLOOD PRESSURE: 144 MMHG

## 2024-04-01 DIAGNOSIS — N18.4 ANEMIA OF CHRONIC RENAL FAILURE, STAGE 4 (SEVERE) (H): Primary | ICD-10-CM

## 2024-04-01 DIAGNOSIS — Z94.1 HEART REPLACED BY TRANSPLANT (H): ICD-10-CM

## 2024-04-01 DIAGNOSIS — N18.4 CKD (CHRONIC KIDNEY DISEASE) STAGE 4, GFR 15-29 ML/MIN (H): ICD-10-CM

## 2024-04-01 DIAGNOSIS — Z94.1 HEART TRANSPLANT, ORTHOTOPIC, STATUS (H): ICD-10-CM

## 2024-04-01 DIAGNOSIS — D63.1 ANEMIA OF CHRONIC RENAL FAILURE, STAGE 4 (SEVERE) (H): Primary | ICD-10-CM

## 2024-04-01 LAB
FERRITIN SERPL-MCNC: 398 NG/ML (ref 31–409)
HCT VFR BLD AUTO: 36.4 % (ref 40–53)
HGB BLD-MCNC: 11.6 G/DL (ref 13.3–17.7)
IRON BINDING CAPACITY (ROCHE): 228 UG/DL (ref 240–430)
IRON SATN MFR SERPL: 30 % (ref 15–46)
IRON SERPL-MCNC: 69 UG/DL (ref 61–157)

## 2024-04-01 PROCEDURE — 83540 ASSAY OF IRON: CPT

## 2024-04-01 PROCEDURE — 36415 COLL VENOUS BLD VENIPUNCTURE: CPT

## 2024-04-01 PROCEDURE — 85014 HEMATOCRIT: CPT | Performed by: NURSE PRACTITIONER

## 2024-04-01 PROCEDURE — 83550 IRON BINDING TEST: CPT

## 2024-04-01 PROCEDURE — 85018 HEMOGLOBIN: CPT | Performed by: NURSE PRACTITIONER

## 2024-04-01 PROCEDURE — 82728 ASSAY OF FERRITIN: CPT

## 2024-04-01 ASSESSMENT — PAIN SCALES - GENERAL: PAINLEVEL: NO PAIN (0)

## 2024-04-01 NOTE — NURSING NOTE
Chief Complaint   Patient presents with    Blood Draw     Vitals, blood drawn via VPT by LPN. Pt checked into appt.      LEROY Rivera LPN

## 2024-04-01 NOTE — PROGRESS NOTES
"Patient did not meet parameters for aranesp injection. Results reviewed with patient. Will follow up with ordering team.     Vital signs:  Temp: 98.2  F (36.8  C) Temp src: Oral BP: (!) 144/72 Pulse: 89   Resp: 16 SpO2: 99 %       Weight: 79 kg (174 lb 1.6 oz)  Estimated body mass index is 28.31 kg/m  as calculated from the following:    Height as of 1/16/24: 1.67 m (5' 5.75\").    Weight as of this encounter: 79 kg (174 lb 1.6 oz).        "

## 2024-04-01 NOTE — PROGRESS NOTES
Anemia Management Note - Follow Up      SUBJECTIVE/OBJECTIVE:    Referred by Arlin Guajardo NP  on 2023  Primary Diagnosis: Anemia in Chronic Kidney Disease (N18.4, D63.1)     Secondary Diagnosis:  Other-Heart Transplant (Z94.1)  Transplant [id number]  Hgb goal range:  9-10  Epo/Darbo: Aranesp  40 mcg  every two weeks  In clinic  Iron regimen:  Ferrous Sulfate  once daily  Labs : 670976  Recent DANDY use, transfusion, IV iron: none  RX/TX plans : 070221  No history of stroke, MI, or cancers. Hx venous thrombus. Previously anticoagulated.  Contact:            Ok to leave message regarding Scheduling, billing and medical information per consent to communicate dated 310                              OK to speak with children Elisabeth Rivers and Lucian Oliveira Jr. regarding Scheduling, billing and medical information per consent to communicate dated 310        Latest Ref Rng & Units 2023 2023 2023 2023 2023 1/10/2024 2024   Anemia   DANDY Dose    40mcg 40mcg 40mcg 40mcg    Hemoglobin 13.3 - 17.7 g/dL 5.8     6.4  8.5     6.9     6.9  9.0  8.0  8.1  9.1  11.6    TSAT 15 - 46 % 27    23   30  30    Ferritin 31 - 409 ng/mL 334    304   277  398    PRBCs  1 unit 1 unit          BP Readings from Last 3 Encounters:   24 (!) 144/72   24 (!) 154/74   01/15/24 (!) 146/79     Wt Readings from Last 2 Encounters:   24 79 kg (174 lb 1.6 oz)   24 80.3 kg (177 lb)         ASSESSMENT:    Hgb:Above goal - recommend hold dose  TSat: at goal >30% Ferritin: At goal (>100ng/mL)      PLAN:  Hold Aranesp.  RTC for hgb then Aranesp if needed in 1 month.    Orders needed to be renewed (for next follow-up date) in EPIC: None    Iron labs due:  every 4 weeks, due in early May 2024    Plan discussed with:  Lucian via - he will have labs done in a month      NEXT FOLLOW-UP DATE:  016860    Carrie Leopold, RN BSN  Anemia Services  [facility]  [email or web address]  Office: [phone]  Fax [phone]

## 2024-04-05 ENCOUNTER — TELEPHONE (OUTPATIENT)
Dept: TRANSPLANT | Facility: CLINIC | Age: 69
End: 2024-04-05
Payer: COMMERCIAL

## 2024-04-05 DIAGNOSIS — Z94.1 HEART TRANSPLANT, ORTHOTOPIC, STATUS (H): Primary | ICD-10-CM

## 2024-04-05 DIAGNOSIS — Z94.1 HEART REPLACED BY TRANSPLANT (H): ICD-10-CM

## 2024-04-05 RX ORDER — LISINOPRIL 10 MG/1
10 TABLET ORAL DAILY
Qty: 30 TABLET | Refills: 11 | Status: SHIPPED | OUTPATIENT
Start: 2024-04-05 | End: 2024-07-22

## 2024-04-05 RX ORDER — LISINOPRIL 10 MG/1
10 TABLET ORAL DAILY
Status: CANCELLED | OUTPATIENT
Start: 2024-04-05

## 2024-04-05 NOTE — TELEPHONE ENCOUNTER
Patient called with concerns about not receiving two of his medications in his latest pharmacy package. According to the patient, the following medications were not mailed to him:   -Bactrim 400 - 80 mg - takes 3 times per week. Patient has about a week's worth on hand.   -Lisinopril 10 mg  daily  The patient thought that both of these medications came from [facility].     Based on chart search, the Bactrim prescription is active and has plenty of refills and the fulfilling [facility] The patient's wife called [facility] and they confirmed they had the prescription and were going to send them a refill.      According to the patient's latest clinic note (November 2023) and his last discharge instructions (Oct 2023), the patient was supposed to be OFF lisinopril. This medication was discontinued at his last admission and as of November he was OFF of it (according to clinic note). However, the patient's wife stated that he started retaking this medication about 2 months ago because he had it at home. Writer reminded patient and wife that it can be dangerous to restart medication without the direction of their care team. They verbalized understanding.      -The patient denies shortness of breath, dizziness, lightheadedness, chest pain, palpitations, diarrhea.  -Weight stable at 170#  -Pt has been taking lisinopril for 2 months - 10 mg daily  -BP has been  125-130's/70-80's    Writer will reach out to provider to determine if he would like Lisinopril to be re-ordered.     UPDATE 1637:  Per Dr. Sheridan, patient can restart Lisinopril 10 mg daily. Recheck BMP in 2 weeks. Pt informed and verbalized understanding.   Prescription order placed, lab order placed. Lab appt created for 4/16 at 1015 AM.

## 2024-04-08 ENCOUNTER — TELEPHONE (OUTPATIENT)
Dept: TRANSPLANT | Facility: CLINIC | Age: 69
End: 2024-04-08
Payer: COMMERCIAL

## 2024-04-08 DIAGNOSIS — Z94.1 HEART TRANSPLANT, ORTHOTOPIC, STATUS (H): Primary | ICD-10-CM

## 2024-04-08 DIAGNOSIS — Z13.220 LIPID SCREENING: ICD-10-CM

## 2024-04-08 NOTE — TELEPHONE ENCOUNTER
Pt called using .  Pt is due for 6 month labs.  Fasting labs with a drug level.  Pt called and reminded/24 hour trough reviewed.  Pt states understanding.

## 2024-04-11 DIAGNOSIS — E11.3513 TYPE 2 DIABETES MELLITUS WITH PROLIFERATIVE RETINOPATHY OF BOTH EYES AND MACULAR EDEMA, UNSPECIFIED WHETHER LONG TERM INSULIN USE (H): ICD-10-CM

## 2024-04-12 RX ORDER — INSULIN LISPRO 100 [IU]/ML
INJECTION, SOLUTION INTRAVENOUS; SUBCUTANEOUS
Qty: 90 ML | Refills: 3 | Status: SHIPPED | OUTPATIENT
Start: 2024-04-12 | End: 2024-05-08

## 2024-04-12 NOTE — TELEPHONE ENCOUNTER
HumaLOG KwikPen 100 UNIT/ML Subcutaneous Solution Pen-injector       Sig: INYECTE DEBAJO DE LA PIEL 5 A 35 UNIDADES PAULETTE VECES AL LISA  ANTES DE LAS COMIDAS     HUMALOG KWIKPEN 100 UNIT/ML soln 45 mL 1 1/16/2024 -- Yes   Sig - Route: Inject 5-35 Units Subcutaneous 3 times daily (before meals) - Subcutaneous     insulin NPH (HUMULIN N KWIKPEN) 100 UNIT/ML injection 45 mL 1 1/16/2024 -- No   Sig: GIve 44 units each morning and 14 at night subcutaneous       Last Office Visit : 1-16-24  Future Office visit:  4-16-24    Routing refill request to provider for review/approval because:  Insulin and insulin pump supplies - refilled per Endocrine clinic.

## 2024-04-12 NOTE — TELEPHONE ENCOUNTER
Insulin Protocol Failed 04/12/2024 07:10 AM   Protocol Details  Has GFR on file in past 12 months and most recent value is normal

## 2024-04-16 ENCOUNTER — OFFICE VISIT (OUTPATIENT)
Dept: ENDOCRINOLOGY | Facility: CLINIC | Age: 69
End: 2024-04-16
Payer: COMMERCIAL

## 2024-04-16 ENCOUNTER — LAB (OUTPATIENT)
Dept: LAB | Facility: CLINIC | Age: 69
End: 2024-04-16
Payer: COMMERCIAL

## 2024-04-16 ENCOUNTER — ALLIED HEALTH/NURSE VISIT (OUTPATIENT)
Dept: EDUCATION SERVICES | Facility: CLINIC | Age: 69
End: 2024-04-16
Payer: COMMERCIAL

## 2024-04-16 VITALS — WEIGHT: 174.3 LBS | BODY MASS INDEX: 28.35 KG/M2

## 2024-04-16 VITALS
HEART RATE: 85 BPM | SYSTOLIC BLOOD PRESSURE: 122 MMHG | WEIGHT: 176 LBS | HEIGHT: 65 IN | OXYGEN SATURATION: 100 % | DIASTOLIC BLOOD PRESSURE: 75 MMHG | BODY MASS INDEX: 29.32 KG/M2

## 2024-04-16 DIAGNOSIS — Z79.4 TYPE 2 DIABETES MELLITUS WITH DIABETIC NEPHROPATHY, WITH LONG-TERM CURRENT USE OF INSULIN (H): Primary | Chronic | ICD-10-CM

## 2024-04-16 DIAGNOSIS — Z13.220 LIPID SCREENING: ICD-10-CM

## 2024-04-16 DIAGNOSIS — Z79.4 TYPE 2 DIABETES MELLITUS WITH DIABETIC NEPHROPATHY, WITH LONG-TERM CURRENT USE OF INSULIN (H): Primary | ICD-10-CM

## 2024-04-16 DIAGNOSIS — E11.21 TYPE 2 DIABETES MELLITUS WITH DIABETIC NEPHROPATHY, WITH LONG-TERM CURRENT USE OF INSULIN (H): Primary | Chronic | ICD-10-CM

## 2024-04-16 DIAGNOSIS — E11.21 TYPE 2 DIABETES MELLITUS WITH DIABETIC NEPHROPATHY, WITH LONG-TERM CURRENT USE OF INSULIN (H): Primary | ICD-10-CM

## 2024-04-16 DIAGNOSIS — Z94.1 HEART TRANSPLANT, ORTHOTOPIC, STATUS (H): ICD-10-CM

## 2024-04-16 DIAGNOSIS — Z94.1 HEART REPLACED BY TRANSPLANT (H): ICD-10-CM

## 2024-04-16 LAB
ALBUMIN SERPL BCG-MCNC: 4 G/DL (ref 3.5–5.2)
ALP SERPL-CCNC: 110 U/L (ref 40–150)
ALT SERPL W P-5'-P-CCNC: 9 U/L (ref 0–70)
ANION GAP SERPL CALCULATED.3IONS-SCNC: 11 MMOL/L (ref 7–15)
AST SERPL W P-5'-P-CCNC: 15 U/L (ref 0–45)
BASOPHILS # BLD AUTO: 0 10E3/UL (ref 0–0.2)
BASOPHILS NFR BLD AUTO: 0 %
BILIRUB SERPL-MCNC: 0.3 MG/DL
BUN SERPL-MCNC: 34.1 MG/DL (ref 8–23)
CALCIUM SERPL-MCNC: 8.8 MG/DL (ref 8.8–10.2)
CHLORIDE SERPL-SCNC: 104 MMOL/L (ref 98–107)
CHOLEST SERPL-MCNC: 130 MG/DL
CK SERPL-CCNC: 82 U/L (ref 39–308)
CREAT SERPL-MCNC: 2.22 MG/DL (ref 0.67–1.17)
DEPRECATED HCO3 PLAS-SCNC: 23 MMOL/L (ref 22–29)
EGFRCR SERPLBLD CKD-EPI 2021: 31 ML/MIN/1.73M2
EOSINOPHIL # BLD AUTO: 0.1 10E3/UL (ref 0–0.7)
EOSINOPHIL NFR BLD AUTO: 3 %
ERYTHROCYTE [DISTWIDTH] IN BLOOD BY AUTOMATED COUNT: 14.1 % (ref 10–15)
FASTING STATUS PATIENT QL REPORTED: YES
GLUCOSE SERPL-MCNC: 97 MG/DL (ref 70–99)
HBA1C MFR BLD: 7.6 %
HCT VFR BLD AUTO: 31.7 % (ref 40–53)
HDLC SERPL-MCNC: 35 MG/DL
HGB BLD-MCNC: 10.2 G/DL (ref 13.3–17.7)
IMM GRANULOCYTES # BLD: 0 10E3/UL
IMM GRANULOCYTES NFR BLD: 0 %
LDLC SERPL CALC-MCNC: 46 MG/DL
LYMPHOCYTES # BLD AUTO: 1 10E3/UL (ref 0.8–5.3)
LYMPHOCYTES NFR BLD AUTO: 27 %
MAGNESIUM SERPL-MCNC: 2.4 MG/DL (ref 1.7–2.3)
MCH RBC QN AUTO: 29.1 PG (ref 26.5–33)
MCHC RBC AUTO-ENTMCNC: 32.2 G/DL (ref 31.5–36.5)
MCV RBC AUTO: 90 FL (ref 78–100)
MONOCYTES # BLD AUTO: 0.3 10E3/UL (ref 0–1.3)
MONOCYTES NFR BLD AUTO: 9 %
NEUTROPHILS # BLD AUTO: 2.2 10E3/UL (ref 1.6–8.3)
NEUTROPHILS NFR BLD AUTO: 61 %
NONHDLC SERPL-MCNC: 95 MG/DL
NRBC # BLD AUTO: 0 10E3/UL
NRBC BLD AUTO-RTO: 0 /100
PHOSPHATE SERPL-MCNC: 3 MG/DL (ref 2.5–4.5)
PLATELET # BLD AUTO: 188 10E3/UL (ref 150–450)
POTASSIUM SERPL-SCNC: 4.8 MMOL/L (ref 3.4–5.3)
PROT SERPL-MCNC: 7 G/DL (ref 6.4–8.3)
RBC # BLD AUTO: 3.51 10E6/UL (ref 4.4–5.9)
SIROLIMUS BLD-MCNC: 5.3 UG/L (ref 5–15)
SODIUM SERPL-SCNC: 138 MMOL/L (ref 135–145)
TME LAST DOSE: NORMAL H
TME LAST DOSE: NORMAL H
TRIGL SERPL-MCNC: 246 MG/DL
WBC # BLD AUTO: 3.7 10E3/UL (ref 4–11)

## 2024-04-16 PROCEDURE — 83735 ASSAY OF MAGNESIUM: CPT | Performed by: PATHOLOGY

## 2024-04-16 PROCEDURE — 80061 LIPID PANEL: CPT | Performed by: PATHOLOGY

## 2024-04-16 PROCEDURE — 82550 ASSAY OF CK (CPK): CPT | Performed by: PATHOLOGY

## 2024-04-16 PROCEDURE — 99215 OFFICE O/P EST HI 40 MIN: CPT | Performed by: PHYSICIAN ASSISTANT

## 2024-04-16 PROCEDURE — 85025 COMPLETE CBC W/AUTO DIFF WBC: CPT | Performed by: PATHOLOGY

## 2024-04-16 PROCEDURE — 36415 COLL VENOUS BLD VENIPUNCTURE: CPT | Performed by: PATHOLOGY

## 2024-04-16 PROCEDURE — 83036 HEMOGLOBIN GLYCOSYLATED A1C: CPT | Performed by: PATHOLOGY

## 2024-04-16 PROCEDURE — 84100 ASSAY OF PHOSPHORUS: CPT | Performed by: PATHOLOGY

## 2024-04-16 PROCEDURE — 80195 ASSAY OF SIROLIMUS: CPT | Performed by: INTERNAL MEDICINE

## 2024-04-16 PROCEDURE — G0108 DIAB MANAGE TRN  PER INDIV: HCPCS | Performed by: REGISTERED NURSE

## 2024-04-16 PROCEDURE — 99000 SPECIMEN HANDLING OFFICE-LAB: CPT | Performed by: PATHOLOGY

## 2024-04-16 PROCEDURE — 80053 COMPREHEN METABOLIC PANEL: CPT | Performed by: PATHOLOGY

## 2024-04-16 RX ORDER — ACYCLOVIR 400 MG/1
TABLET ORAL
Qty: 3 EACH | Refills: 5 | Status: SHIPPED | OUTPATIENT
Start: 2024-04-16 | End: 2024-04-24

## 2024-04-16 ASSESSMENT — PAIN SCALES - GENERAL: PAINLEVEL: NO PAIN (0)

## 2024-04-16 NOTE — LETTER
4/16/2024       RE: Lucian Oliveira  [address] [facility] [address]     Dear Colleague,    Thank you for referring your patient, Lucian Oliveira, to the [facility] [place] [facility]. Please see a copy of my visit note below.    03/29/24 11:14 AM  PATIENT LAB/IMAGING STATUS : No pending lab orders   Patient is showing 4/5 MNCM met. Not on statin   Outcome for 04/15/24 11:09 AM: Derceto message sent  Anamaria Roa CMA      Jose and Shivani here in clinic today to follow-up his type 2 diabetes.    We last met in January and advised increase his NPH dose from 40 in the morning and 12 in the evening to 44 and 14.  We also increased Trulicity from 3 to 4.5 mg weekly.    We discussed different forms of carbohydrate which she generally is having with breakfast and I am recommending he give 5 units of Humalog with breakfast as long as this does not lead to any low blood sugars.  I am asking them also to more consistently check the blood sugar before lunch and give Humalog when needed.      Have been discussing transitioning to a more simple regimen and will again see Bre Mi to further consider this.      Today:    They also met with Bre Mi this morning and downloaded meter which revealed am BG 91 - 133 and evening  - 262.  She placed a G7 Dexcom and gave them a .      His BG in the morning are in goal but in the afternoon are higher.  They are NOT giving Humalog with breakfast and he is eating fruits such as banana melon and prunes throughout the midday.      Awakes 8 am and gives NPH 44 units.  Has breakfast at 8:30 am.   Recently this has become his largest meal in the last month.  Has lunch around 1-2 pm, this previously was his largest meal.    Checks BG 6 pm, gives Humalog has coffee with whole wheat bread.      Had eye apt in March now will see again in May.  Eyes improving.  Also  will see nephrology May 20th.  No cardiology concerns.  His stomach is now well since November.      DM medications:  NPH 44 qam, 14 qpm.  Breakfast Humalog 5 units - not taking  Lunch and Dinner Humalog 15 units + 2/30 >140.    Jardiance 10 mg daily  Trulicity 4.5 mg weekly (increased from 3 mg at last visit.)     TDD roughly 81 units    BG: as above.      Objective:  Pleasant Citizen of the [place]-speaking male in no acute distress.  Accompanied by his wife who is knowledgeable in the medication doses.  Mood is good affect is appropriate respirations are easy and unlabored.  There is significant edema in the feet and up to two thirds of the calf.  Pitting 1+ distal legs and ankles.  The skin on the feet is good condition, good capillary refill monofilament sensation is intact.  No lesions.  Reduced vibratory sensation at base great toes B.      Recent Labs   Lab Test 01/16/24  1148 01/15/24  1511 11/16/23  1914 11/08/23  0908 11/07/23  1113 08/14/23  0917 07/28/23  1226 07/28/23  1205 07/17/23  0908 02/14/23  0821 02/02/23  1121 11/30/21  0859 11/17/21  1126 08/10/20  0845 08/05/20  0843 07/07/20  1732 07/07/20  0720 08/16/18  0834 08/06/18  0000 04/30/18  1148 04/30/18  0000   A1C 6.9*  --   --   --  8.1*  --   --   --  8.7*  --  8.3*   < >  --    < >  --   --   --    < >  --   --   --    HEMOGLOBINA1  --   --   --   --   --   --   --   --   --   --   --   --   --   --   --   --   --   --  8.3*  --  10.6*   TSH  --   --   --   --   --   --   --   --   --   --   --   --   --   --  0.80  --  1.30   < >  --   --   --    LDL  --   --   --   --   --   --   --   --  26  --  14   < >  --    < >  --   --   --    < >  --   --   --    HDL  --   --   --   --   --   --   --   --  30*  --  32*   < >  --    < >  --   --   --    < >  --   --   --    TRIG  --   --   --   --   --   --   --   --  178*  --  365*   < >  --    < >  --   --   --    < >  --   --   --    CR  --  2.10* 1.88*   < >  --    < >  --    < > 1.95*   < > 2.26*   < >  --    <  > 1.70*   < >  --    < >  --    < >  --    MICROL  --   --   --   --   --   --  21.4  --   --   --   --   --  146  --   --   --   --    < >  --   --   --     < > = values in this interval not displayed.        GFR Estimate   Date Value Ref Range Status   01/15/2024 33 (L) >60 mL/min/1.73m2 Final   11/16/2023 38 (L) >60 mL/min/1.73m2 Final   11/08/2023 40 (L) >60 mL/min/1.73m2 Final   05/11/2021 37 (L) >60 mL/min/[1.73_m2] Final     Comment:     Non  GFR Calc  Starting 12/18/2018, serum creatinine based estimated GFR (eGFR) will be   calculated using the Chronic Kidney Disease Epidemiology Collaboration   (CKD-EPI) equation.     03/02/2021 37 (L) >60 mL/min/[1.73_m2] Final     Comment:     Non  GFR Calc  Starting 12/18/2018, serum creatinine based estimated GFR (eGFR) will be   calculated using the Chronic Kidney Disease Epidemiology Collaboration   (CKD-EPI) equation.     01/14/2021 41 (L) >60 mL/min/[1.73_m2] Final     Comment:     Non  GFR Calc  Starting 12/18/2018, serum creatinine based estimated GFR (eGFR) will be   calculated using the Chronic Kidney Disease Epidemiology Collaboration   (CKD-EPI) equation.       Hemoglobin   Date Value Ref Range Status   04/16/2024 10.2 (L) 13.3 - 17.7 g/dL Final   05/11/2021 13.4 13.3 - 17.7 g/dL Final   ]      FIB-4 Calculation: 2.37 at 11/17/2023  5:56 AM  Calculated from:  SGOT/AST: 17 U/L at 10/25/2023 10:05 AM  SGPT/ALT: 13 U/L at 10/25/2023 10:05 AM  Platelets: 139 10e3/uL at 11/17/2023  5:56 AM  Age: 68 years      No hx elevated Amylase or lipase.     EXAMINATION: CT ABDOMEN PELVIS W/O CONTRAST, 10/11/2023 7:07 PM   Pancreas: Mild fatty atrophy of the pancreas. No focal mass or  dilation of the main pancreatic duct.    EXAMINATION: US ABDOMEN COMPLETE, 7/9/2019 6:54 AM   Pancreas: Visualized portions of the head and body of the pancreas are  unremarkable.     Assessment and plan:  Type 2 diabetes  A1c today increased  today to 7.6 with higher evening BG (Hgb 10.6), but morning blood glucose at goal.      Will increase qam NPH 48 and reduce pm dose to 13 units.     Had G7 placed today.  Had hoped to create simpler, more effective regimen, but if this is reassuring may not be needed.  This will need to be done with good support and follow-up as they have limited health literacy and have struggled with changes to regimen.     I have been thinking Once daily basal insulin with Humalog meal base plus sliding scale at 2-3 meals should be more effective long term, but will see what things look like with G7 data.  He may be nearing BG goals.    Dm Complications:  Foot exam updated today. No concerns.  Eye exam utd, retinopathy stabilizing.  Has cardiology and nephrology follow-up - Discussed continuing Jardiance for now.  GFR remains >30, but has further lab work scheduled.          50 minutes spent on the date of the encounter doing chart review, history and exam, documentation, education and counseling, as well as communication and coordination of care, and further activities as noted above.  This time excludes time spent reviewing CGM.  It is my privilege to be involved in the care of the above patient.     Marisabel Prieto PA-C, MPAS  [facility]  Diabetes, Endocrinology, and Metabolism  [phone] Appointments/Nurse Line  [phone] Fax  [phone] pager  On [facility] (inpatient)

## 2024-04-16 NOTE — PATIENT INSTRUCTIONS
Estimado Lucian y Shivani,        En la manana, en vez de 44 del ira - sube a 48 unidades.    En la noche en vez de 14 unidades, baje a 13 unidades.    Sigue igual con Trulicity, Jardiance y Humalog sin cambio.      My best wishes,    Marisabel Prieto PA-C, MPAS  [facility]  Diabetes, Endocrinology, and Metabolism  [phone] Appointments/Nurse  [phone] Fax

## 2024-04-16 NOTE — PROGRESS NOTES
Jose and Shivani here in clinic today to follow-up his type 2 diabetes.    We last met in January and advised increase his NPH dose from 40 in the morning and 12 in the evening to 44 and 14.  We also increased Trulicity from 3 to 4.5 mg weekly.    We discussed different forms of carbohydrate which she generally is having with breakfast and I am recommending he give 5 units of Humalog with breakfast as long as this does not lead to any low blood sugars.  I am asking them also to more consistently check the blood sugar before lunch and give Humalog when needed.      Have been discussing transitioning to a more simple regimen and will again see Bre Trejorow to further consider this.      Today:    They also met with Bre Mi this morning and downloaded meter which revealed am BG 91 - 133 and evening  - 262.  She placed a G7 Dexcom and gave them a .      His BG in the morning are in goal but in the afternoon are higher.  They are NOT giving Humalog with breakfast and he is eating fruits such as banana melon and prunes throughout the midday.      Awakes 8 am and gives NPH 44 units.  Has breakfast at 8:30 am.   Recently this has become his largest meal in the last month.  Has lunch around 1-2 pm, this previously was his largest meal.    Checks BG 6 pm, gives Humalog has coffee with whole wheat bread.      Had eye apt in March now will see again in May.  Eyes improving.  Also will see nephrology May 20th.  No cardiology concerns.  His stomach is now well since November.      DM medications:  NPH 44 qam, 14 qpm.  Breakfast Humalog 5 units - not taking  Lunch and Dinner Humalog 15 units + 2/30 >140.    Jardiance 10 mg daily  Trulicity 4.5 mg weekly (increased from 3 mg at last visit.)     TDD roughly 81 units    BG: as above.      Objective:  Pleasant [place]-speaking male in no acute distress.  Accompanied by his wife who is knowledgeable in the medication doses.  Mood is good affect is appropriate  respirations are easy and unlabored.  There is significant edema in the feet and up to two thirds of the calf.  Pitting 1+ distal legs and ankles.  The skin on the feet is good condition, good capillary refill monofilament sensation is intact.  No lesions.  Reduced vibratory sensation at base great toes B.      Recent Labs   Lab Test 01/16/24  1148 01/15/24  1511 11/16/23  1914 11/08/23  0908 11/07/23  1113 08/14/23  0917 07/28/23  1226 07/28/23  1205 07/17/23  0908 02/14/23  0821 02/02/23  1121 11/30/21  0859 11/17/21  1126 08/10/20  0845 08/05/20  0843 07/07/20  1732 07/07/20  0720 08/16/18  0834 08/06/18  0000 04/30/18  1148 04/30/18  0000   A1C 6.9*  --   --   --  8.1*  --   --   --  8.7*  --  8.3*   < >  --    < >  --   --   --    < >  --   --   --    HEMOGLOBINA1  --   --   --   --   --   --   --   --   --   --   --   --   --   --   --   --   --   --  8.3*  --  10.6*   TSH  --   --   --   --   --   --   --   --   --   --   --   --   --   --  0.80  --  1.30   < >  --   --   --    LDL  --   --   --   --   --   --   --   --  26  --  14   < >  --    < >  --   --   --    < >  --   --   --    HDL  --   --   --   --   --   --   --   --  30*  --  32*   < >  --    < >  --   --   --    < >  --   --   --    TRIG  --   --   --   --   --   --   --   --  178*  --  365*   < >  --    < >  --   --   --    < >  --   --   --    CR  --  2.10* 1.88*   < >  --    < >  --    < > 1.95*   < > 2.26*   < >  --    < > 1.70*   < >  --    < >  --    < >  --    MICROL  --   --   --   --   --   --  21.4  --   --   --   --   --  146  --   --   --   --    < >  --   --   --     < > = values in this interval not displayed.        GFR Estimate   Date Value Ref Range Status   01/15/2024 33 (L) >60 mL/min/1.73m2 Final   11/16/2023 38 (L) >60 mL/min/1.73m2 Final   11/08/2023 40 (L) >60 mL/min/1.73m2 Final   05/11/2021 37 (L) >60 mL/min/[1.73_m2] Final     Comment:     Non  GFR Calc  Starting 12/18/2018, serum creatinine based  estimated GFR (eGFR) will be   calculated using the Chronic Kidney Disease Epidemiology Collaboration   (CKD-EPI) equation.     03/02/2021 37 (L) >60 mL/min/[1.73_m2] Final     Comment:     Non  GFR Calc  Starting 12/18/2018, serum creatinine based estimated GFR (eGFR) will be   calculated using the Chronic Kidney Disease Epidemiology Collaboration   (CKD-EPI) equation.     01/14/2021 41 (L) >60 mL/min/[1.73_m2] Final     Comment:     Non  GFR Calc  Starting 12/18/2018, serum creatinine based estimated GFR (eGFR) will be   calculated using the Chronic Kidney Disease Epidemiology Collaboration   (CKD-EPI) equation.       Hemoglobin   Date Value Ref Range Status   04/16/2024 10.2 (L) 13.3 - 17.7 g/dL Final   05/11/2021 13.4 13.3 - 17.7 g/dL Final   ]      FIB-4 Calculation: 2.37 at 11/17/2023  5:56 AM  Calculated from:  SGOT/AST: 17 U/L at 10/25/2023 10:05 AM  SGPT/ALT: 13 U/L at 10/25/2023 10:05 AM  Platelets: 139 10e3/uL at 11/17/2023  5:56 AM  Age: 68 years      No hx elevated Amylase or lipase.     EXAMINATION: CT ABDOMEN PELVIS W/O CONTRAST, 10/11/2023 7:07 PM   Pancreas: Mild fatty atrophy of the pancreas. No focal mass or  dilation of the main pancreatic duct.    EXAMINATION: US ABDOMEN COMPLETE, 7/9/2019 6:54 AM   Pancreas: Visualized portions of the head and body of the pancreas are  unremarkable.     Assessment and plan:  Type 2 diabetes  A1c today increased today to 7.6 with higher evening BG (Hgb 10.6), but morning blood glucose at goal.      Will increase qam NPH 48 and reduce pm dose to 13 units.     Had G7 placed today.  Had hoped to create simpler, more effective regimen, but if this is reassuring may not be needed.  This will need to be done with good support and follow-up as they have limited health literacy and have struggled with changes to regimen.     I have been thinking Once daily basal insulin with Humalog meal base plus sliding scale at 2-3 meals should be more  effective long term, but will see what things look like with G7 data.  He may be nearing BG goals.    Dm Complications:  Foot exam updated today. No concerns.  Eye exam utd, retinopathy stabilizing.  Has cardiology and nephrology follow-up - Discussed continuing Jardiance for now.  GFR remains >30, but has further lab work scheduled.          50 minutes spent on the date of the encounter doing chart review, history and exam, documentation, education and counseling, as well as communication and coordination of care, and further activities as noted above.  This time excludes time spent reviewing CGM.  It is my privilege to be involved in the care of the above patient.     Marisabel Prieto PA-C, MPAS  [facility]  Diabetes, Endocrinology, and Metabolism  [phone] Appointments/Nurse Line  [phone] Fax  [phone] pager  On [facility] (inpatient)

## 2024-04-16 NOTE — PROGRESS NOTES
Diabetes Self-Management Education & Support    Lucian Oliveira presents today for education related to Type 2 diabetes    Patient is being treated with:  MDI Insulin  He is accompanied by spouse, Shivani.    Year of diagnosis: he thinks he has had diabetes for around 20 years.   Referring provider:  Marisabel Prieto PA-C  Living Situation: Lives with his wife, Shivani  Employment: Not employed.     Potential Barriers to Control:  poor literacy skills in English and Swedish.  Speaks and understands very limited English.  His wife, Shivani seems to be in charge of his diabetes management.      Diabetes Complications:  NPDR, CKD, CAD/CHF with heart transplant in 2020.     PATIENT CONCERNS/REASON FOR REFERRAL:  Here for follow up.  At our last visit, we decided to defer that implementation of long acting insulin and rapid acting insulin three times daily because at that time, he was about to leave the state to stay with his daughter and her family in [place] for 2 months.  He is now back in the state.    ASSESSMENT:    Taking Medication:     NPH insulin (Humulin-N):  44 units in the AM, 14 units in the PM.   Humalog insulin:  15 units with the evening meal, with an increase of 2 units per 30 mg/dL over a target of 130 mg/dL.    Trulicity:  4.5 mg weekly.  Takes on Mondays  Jardiance:  10 mg daily.      Monitoring    Patient glucose self monitoring as follows: two times daily  BG meter: Accu-Chek Guide  CGM: none  BG results: See report below:             Patient's most recent   Lab Results   Component Value Date    A1C 6.9 01/16/2024    A1C 8.7 07/17/2023    A1C 6.7 01/14/2021      Patient's A1C goal: <8.0    EDUCATION and INSTRUCTION PROVIDED AT THIS VISIT:       Reviewed glucoses:  Fasting glucoses are in target range 100% of the time.    He is consistently high in the 200's before dinner.    Apparently they are only giving the Humalog insulin with the evening meal.   He eats a fairly substantial breakfast, but has  noticed that his appetite has dwindled since his Trulicity was increased to 4.5 mg weekly in January.  He eats some fruit in the afternoon, with no coverage, which likely accounts for his high pre-prandial glucoses at dinnertime.  We have no other data at this time, so in the interest of getting a more complete picture of his glucose control, I started a Dexcom G7 sensor and gave him a  for such (as he doesn't use a cell phone), explained the operation of it, and made a follow up appointment next week to download the  and review the data.      Instructed not to make any changes in how they are managing while he's wearing the Dexcom.      Discussed with Marisabel Prieto, with whom he has an appointment today.      Patient-stated goal written and given to Lucian Oliveira.  Verbalized and demonstrated understanding of instructions.     PLAN:    See patient instructions  AVS printed and given to patient    FOLLOW-UP:        Time spent with patient at today's visit was 60 minutes.      Any diabetes medication dose changes were made via the CDE Protocol and Collaborative Practice Agreement with [facility] and  [facility].  A copy of this encounter was provided to patient's referring provider.

## 2024-04-17 ENCOUNTER — TELEPHONE (OUTPATIENT)
Dept: TRANSPLANT | Facility: CLINIC | Age: 69
End: 2024-04-17
Payer: COMMERCIAL

## 2024-04-17 ENCOUNTER — HOSPITAL ENCOUNTER (OUTPATIENT)
Facility: CLINIC | Age: 69
End: 2024-04-17
Attending: INTERNAL MEDICINE | Admitting: INTERNAL MEDICINE
Payer: COMMERCIAL

## 2024-04-17 DIAGNOSIS — Z94.1 HEART REPLACED BY TRANSPLANT (H): ICD-10-CM

## 2024-04-17 DIAGNOSIS — Z94.1 HEART REPLACED BY TRANSPLANT (H): Primary | ICD-10-CM

## 2024-04-17 NOTE — TELEPHONE ENCOUNTER
6 month lab results called to pt.  Labs at baseline for pt.  Rapa level 25 hour trough.  No changes needed at this time.

## 2024-04-19 ENCOUNTER — TRANSFERRED RECORDS (OUTPATIENT)
Dept: HEALTH INFORMATION MANAGEMENT | Facility: CLINIC | Age: 69
End: 2024-04-19
Payer: COMMERCIAL

## 2024-04-24 ENCOUNTER — ALLIED HEALTH/NURSE VISIT (OUTPATIENT)
Dept: EDUCATION SERVICES | Facility: CLINIC | Age: 69
End: 2024-04-24
Payer: COMMERCIAL

## 2024-04-24 ENCOUNTER — VIRTUAL VISIT (OUTPATIENT)
Dept: INTERPRETER SERVICES | Facility: CLINIC | Age: 69
End: 2024-04-24
Payer: COMMERCIAL

## 2024-04-24 DIAGNOSIS — E11.3513 TYPE 2 DIABETES MELLITUS WITH PROLIFERATIVE RETINOPATHY OF BOTH EYES AND MACULAR EDEMA, UNSPECIFIED WHETHER LONG TERM INSULIN USE (H): ICD-10-CM

## 2024-04-24 DIAGNOSIS — E11.21 TYPE 2 DIABETES MELLITUS WITH DIABETIC NEPHROPATHY, WITH LONG-TERM CURRENT USE OF INSULIN (H): ICD-10-CM

## 2024-04-24 DIAGNOSIS — Z79.4 TYPE 2 DIABETES MELLITUS WITH DIABETIC NEPHROPATHY, WITH LONG-TERM CURRENT USE OF INSULIN (H): ICD-10-CM

## 2024-04-24 PROCEDURE — G0108 DIAB MANAGE TRN  PER INDIV: HCPCS | Performed by: REGISTERED NURSE

## 2024-04-24 PROCEDURE — 99207 PR NO BILLABLE SERVICE THIS VISIT: CPT | Mod: U4

## 2024-04-24 RX ORDER — INSULIN HUMAN 100 [IU]/ML
INJECTION, SUSPENSION SUBCUTANEOUS
Qty: 45 ML | Refills: 1 | Status: SHIPPED | OUTPATIENT
Start: 2024-04-24 | End: 2024-05-08

## 2024-04-24 RX ORDER — ACYCLOVIR 400 MG/1
TABLET ORAL
Qty: 9 EACH | Refills: 1 | Status: SHIPPED | OUTPATIENT
Start: 2024-04-24 | End: 2024-09-09

## 2024-04-24 NOTE — PROGRESS NOTES
Diabetes Self-Management Education & Support    Lucian Oliveira presents today for education related to Type 2 diabetes    Patient is being treated with:  MDI Insulin  He is accompanied by wife, Shivani    Year of diagnosis: he thinks he has had diabetes for around 20 years.   Referring provider:  Marisabel Prieto PA-C  Living Situation: Lives with his wife, Shivani  Employment: Not employed.     Potential Barriers to Control:  poor literacy skills in English and Irish.  Speaks and understands very limited English.  His wife, Shivani seems to be in charge of his diabetes management.       Diabetes Complications:  NPDR, CKD, CAD/CHF with heart transplant in 2020.     PATIENT CONCERNS/REASON FOR REFERRAL 10 days ago, we started him on the Dexcom G7 continuous glucose monitor to get a better idea of his overall glucose control.  He is here today for follow up.      ASSESSMENT:    Taking Medication:     Current Diabetes Management per Patient:  Taking diabetes medications  Following our visit on April 16, Lucian had an appointment with Marisabel Prieto immediately afterward, and she changed his insulin dosing as follows:   NPH (Humulin-N):  48 units in the AM, 13 units at night.   She prescribed 5 units of Humalog insulin with breakfast, and to follow a sliding scale at his 2pm lunch of 15 units if pre-meal BG is under 130, and to add 2 units for every 30 points over 130.  He has not been taking the Humalog before breakfast.  He is only taking the rapid acting insulin before lunch.      Monitoring    Patient glucose self monitoring as follows: he has been wearing the G7 loaner for the past week.  In addition he has been checking his glucose with an Accuchek Guide meter and finds that there are discrepencies.    BG results: See Dexcom report below.              Patient's most recent   Lab Results   Component Value Date    A1C 7.6 04/16/2024    A1C 8.7 07/17/2023    A1C 6.7 01/14/2021      Patient's A1C goal:  "<8.0    Activity: no regular exercise program    Healthy Eatin:00am to 09:00am:  Breakfast, which is pretty typical for him:  2 eggs, 1/2 cup of quigley beans, 1-2  6 inch tortillas, 1-2 pieces of toasted bread, a medium banana. Drinks coffee with cream, no sugar.  This constitutes roughly 100 grams of carbohydrate.      2:00pm:  Lunch:  usually 1 cup of cooked rice with vegetables and some kind of meat.  He will also eat 1-2  6 inch tortillas with this.      6:00pm to 7:00pm:  Dinner is quite light.  He may have a piece of toast and some fruit, and maybe some nuts, but rarely more than this.       EDUCATION and INSTRUCTION PROVIDED AT THIS VISIT:      This discussion was done with the help of  over the phone.    We reviewed the action and timing of both NPH insulin and Humalog insulin.  Apparently at their visit with Marisabel, they misunderstood the instructions and have not been giving Lucian the 5 units of Humalog with breakfast.  We reviewed his Dexcom report together and showed him the rise in glucose he is getting from not covering this with Humalog.   Explained the difference in onset from Humulin-N and Humalog and reassured them that he can take both insulins at the same time.  Explained that with this, he will likely have lower glucoses prior to lunch, so the amount he is giving right now for lunch (15 units + correction) will likely be too much, so asked him to only give 5 units with lunch as well. Shivani was able to repeat the instructions back to me through the .      We also discussed the difference between the Dexcom continuous glucose sensor readings and his fingerstick blood glucose readings.  He was concerned because the numbers were different, but reassured after our discussion that he can \"trust\" it, and that the two readings will rarely be the same.  He was been sent a shipment of sensors.  Instructed Shivani in inserting the sensor, which she was able to do with " some coaching.      We set up another appointment in 2 weeks to review glucose and adjust insulin dosing as needed.      He requests that a prescription for a 3 month supply of sensors be sent to his mail order pharmacy.        Patient-stated goal written and given to Lucian Oliveira.  Verbalized and demonstrated understanding of instructions.     PLAN:    See patient instructions  AVS printed and given to patient    FOLLOW-UP:        Time spent with patient at today's visit was 60 minutes.      Any diabetes medication dose changes were made via the CDE Protocol and Collaborative Practice Agreement with [facility].  A copy of this encounter was provided to patient's referring provider.

## 2024-04-29 ENCOUNTER — LAB (OUTPATIENT)
Dept: LAB | Facility: CLINIC | Age: 69
End: 2024-04-29
Attending: NURSE PRACTITIONER
Payer: COMMERCIAL

## 2024-04-29 DIAGNOSIS — Z94.1 HEART REPLACED BY TRANSPLANT (H): ICD-10-CM

## 2024-04-29 DIAGNOSIS — D63.1 ANEMIA OF CHRONIC RENAL FAILURE, STAGE 4 (SEVERE) (H): ICD-10-CM

## 2024-04-29 DIAGNOSIS — N18.4 ANEMIA OF CHRONIC RENAL FAILURE, STAGE 4 (SEVERE) (H): ICD-10-CM

## 2024-04-29 LAB
FERRITIN SERPL-MCNC: 284 NG/ML (ref 31–409)
HCT VFR BLD AUTO: 30.9 % (ref 40–53)
HGB BLD-MCNC: 10.2 G/DL (ref 13.3–17.7)
IRON BINDING CAPACITY (ROCHE): 203 UG/DL (ref 240–430)
IRON SATN MFR SERPL: 26 % (ref 15–46)
IRON SERPL-MCNC: 52 UG/DL (ref 61–157)

## 2024-04-29 PROCEDURE — 83550 IRON BINDING TEST: CPT

## 2024-04-29 PROCEDURE — 36415 COLL VENOUS BLD VENIPUNCTURE: CPT

## 2024-04-29 PROCEDURE — 82728 ASSAY OF FERRITIN: CPT

## 2024-04-29 PROCEDURE — 85018 HEMOGLOBIN: CPT

## 2024-04-29 NOTE — NURSING NOTE
Chief Complaint   Patient presents with    Lab Only     Labs drawn with  by rn.     Labs drawn with  by rn.  Pt tolerated well.      Iman Maravilla RN

## 2024-05-02 ENCOUNTER — PATIENT OUTREACH (OUTPATIENT)
Dept: CARE COORDINATION | Facility: CLINIC | Age: 69
End: 2024-05-02
Payer: COMMERCIAL

## 2024-05-02 ENCOUNTER — APPOINTMENT (OUTPATIENT)
Dept: INTERPRETER SERVICES | Facility: CLINIC | Age: 69
End: 2024-05-02
Payer: COMMERCIAL

## 2024-05-02 DIAGNOSIS — N18.4 CKD (CHRONIC KIDNEY DISEASE) STAGE 4, GFR 15-29 ML/MIN (H): ICD-10-CM

## 2024-05-02 DIAGNOSIS — D63.1 ANEMIA OF CHRONIC RENAL FAILURE, STAGE 4 (SEVERE) (H): Primary | ICD-10-CM

## 2024-05-02 DIAGNOSIS — N18.4 ANEMIA OF CHRONIC RENAL FAILURE, STAGE 4 (SEVERE) (H): Primary | ICD-10-CM

## 2024-05-02 DIAGNOSIS — Z94.1 HEART REPLACED BY TRANSPLANT (H): ICD-10-CM

## 2024-05-02 RX ORDER — FERROUS SULFATE 325(65) MG
325 TABLET ORAL 2 TIMES DAILY WITH MEALS
Qty: 180 TABLET | Refills: 3 | Status: SHIPPED | OUTPATIENT
Start: 2024-05-02 | End: 2024-07-19

## 2024-05-02 NOTE — PROGRESS NOTES
Anemia Management Note - Follow Up      SUBJECTIVE/OBJECTIVE:    Referred by Arlin Guajardo NP  on 2023  Primary Diagnosis: Anemia in Chronic Kidney Disease (N18.4, D63.1)     Secondary Diagnosis:  Other-Heart Transplant (Z94.1)  Transplant [id number]  Hgb goal range:  9-10  Epo/Darbo: Aranesp  40 mcg  every two weeks for Hgb <10, In clinic  Iron regimen:  Ferrous Sulfate BID  Ferrous Sulfate  once daily, increased to BID on 875255    Recent DANDY use, transfusion, IV iron: none    Labs : 822007  RX/TX plans : 1112    No history of stroke, MI, or cancers. Hx venous thrombus. Previously anticoagulated.  Contact:            Ok to leave message regarding Scheduling, billing and medical information per consent to communicate dated 310                              OK to speak with children Elisabeth Rivers and Lucian Oliveira Jr. regarding Scheduling, billing and medical information per consent to communicate dated 310        Latest Ref Rng & Units 2023 2023 2023 1/10/2024 2024 2024 2024   Anemia   DANDY Dose  40mcg 40mcg 40mcg 40mcg      Hemoglobin 13.3 - 17.7 g/dL 9.0  8.0  8.1  9.1  11.6  10.2  10.2    TSAT 15 - 46 %  23   30  30   26    Ferritin 31 - 409 ng/mL  304   277  398   284      BP Readings from Last 3 Encounters:   24 122/75   24 (!) 144/72   24 (!) 154/74     Wt Readings from Last 2 Encounters:   24 79.8 kg (176 lb)   24 79.1 kg (174 lb 4.8 oz)         ASSESSMENT:    Hgb:Above goal - recommend hold dose  TSat: not at goal of >30% Ferritin: At goal (>100ng/mL)   Goals Addressed    None         PLAN:  Hold Aranesp.  RTC for anemia labs then Aranesp if needed in 4 week(s).    Orders needed to be renewed (for next follow-up date) in EPIC: None    Iron labs due:  every 4 weeks, due in late May 2024    Plan discussed with:  Lucian with . Writer advised he start oral iron BID and rx will be sent to [facility]. Also  advised lab draw in a month. His daughter who provides transportation for he and his wife will be unavailable in the month of June so he asked about getting labs done in July. After discussion, he will have labs done as planned on 072224 and writer will reach out to him in early July to check in on need for labs sooner. Writer encouraged him to call anytime if any concerns come up. He voiced understanding.      NEXT FOLLOW-UP DATE:  070224    Carrie Leopold, RN BSN  Anemia Services  [facility]  [email or web address]  Office: [phone]  Fax [phone]

## 2024-05-06 ENCOUNTER — TELEPHONE (OUTPATIENT)
Dept: TRANSPLANT | Facility: CLINIC | Age: 69
End: 2024-05-06
Payer: COMMERCIAL

## 2024-05-06 ENCOUNTER — APPOINTMENT (OUTPATIENT)
Dept: INTERPRETER SERVICES | Facility: CLINIC | Age: 69
End: 2024-05-06
Payer: COMMERCIAL

## 2024-05-06 ENCOUNTER — MYC MEDICAL ADVICE (OUTPATIENT)
Dept: TRANSPLANT | Facility: CLINIC | Age: 69
End: 2024-05-06
Payer: COMMERCIAL

## 2024-05-06 DIAGNOSIS — Z79.4 TYPE 2 DIABETES MELLITUS WITH DIABETIC NEPHROPATHY, WITH LONG-TERM CURRENT USE OF INSULIN (H): Primary | ICD-10-CM

## 2024-05-06 DIAGNOSIS — E11.21 TYPE 2 DIABETES MELLITUS WITH DIABETIC NEPHROPATHY, WITH LONG-TERM CURRENT USE OF INSULIN (H): Primary | ICD-10-CM

## 2024-05-06 NOTE — TELEPHONE ENCOUNTER
Pt called and requested a new Rx for Jardiance 10mg daily.  No active Rx on file, however Endocrine notes state he is taking this med.  Message sent to Endocrine team asking them to address.  Pt states understanding-  used for call.

## 2024-05-08 ENCOUNTER — ALLIED HEALTH/NURSE VISIT (OUTPATIENT)
Dept: EDUCATION SERVICES | Facility: CLINIC | Age: 69
End: 2024-05-08
Payer: COMMERCIAL

## 2024-05-08 DIAGNOSIS — E11.3513 TYPE 2 DIABETES MELLITUS WITH PROLIFERATIVE RETINOPATHY OF BOTH EYES AND MACULAR EDEMA, UNSPECIFIED WHETHER LONG TERM INSULIN USE (H): ICD-10-CM

## 2024-05-08 PROCEDURE — G0108 DIAB MANAGE TRN  PER INDIV: HCPCS | Performed by: REGISTERED NURSE

## 2024-05-08 RX ORDER — INSULIN HUMAN 100 [IU]/ML
INJECTION, SUSPENSION SUBCUTANEOUS
Qty: 45 ML | Refills: 1 | Status: SHIPPED | OUTPATIENT
Start: 2024-05-08 | End: 2024-08-06

## 2024-05-08 RX ORDER — INSULIN LISPRO 100 [IU]/ML
INJECTION, SOLUTION INTRAVENOUS; SUBCUTANEOUS
Qty: 90 ML | Refills: 3 | Status: SHIPPED | OUTPATIENT
Start: 2024-05-08 | End: 2024-07-02

## 2024-05-08 NOTE — PROGRESS NOTES
Diabetes Self-Management Education & Support    Lucian Oliveira presents today for education related to Type 2 diabetes    Patient is being treated with:  Oral Agents, MDI Insulin, and GLP-1 Agonist  He is accompanied by spouse, Shivani.   by phone # [phone]    Year of diagnosis: he thinks he has had diabetes for around 20 years.   Referring provider:  Marisabel Prieto PA-C  Living Situation: Lives with his wife, Shivani  Employment: Not employed.     Potential Barriers to Control:  poor literacy skills in English and Czech.  Speaks and understands very limited English.  His wife, Shivani seems to be in charge of his diabetes management.       Diabetes Complications:  NPDR, CKD, CAD/CHF with heart transplant in 2020.     PATIENT CONCERNS/REASON FOR REFERRAL:  Follow up for Type 2 diabetes on insulin.       ASSESSMENT:    Taking Medication:     Current Diabetes Management per Patient:  Taking diabetes medication regimen:   Trulicity 4.5 mg weekly  NPH insulin 48 units in AM, 13 units at bedtime.  Humalog insulin:  5 units with breakfast and lunch.     Monitoring    Patient glucose self monitoring as follows: continuously using a continuous glucose monitor (CGM)  CGM: Dexcom G7  BG results:                 Patient's most recent   Lab Results   Component Value Date    A1C 7.6 04/16/2024    A1C 8.7 07/17/2023    A1C 6.7 01/14/2021      Patient's A1C goal: <8.0    Activity: no regular exercise program    Healthy Eating:   Please see previous note.  He generally eats about 100 grams of CHO at breakfast, about 50 to 75 with lunch and about 45 at dinner.       Problem Solving:      Patient is at risk of hypoglycemia?: YES and Frequency is less than once a month  Hospitalizations for hyper or hypoglycemia: No    EDUCATION and INSTRUCTION PROVIDED AT THIS VISIT:       Verified with Shivani and Lucian that he is taking his insulin as prescribed, and he is.    It appears that his overnight control is pretty  good, with NPH insulin 13 units at bedtime.   During the day, despite adding 5 units of Humalog with his breakfast, his glucoses are trending up into the 200's and don't really return to target for the rest of the day.   He is taking 5 units with lunch and has a light supper coffee, 2 toast with jelly at around 6-7pm.      I asked him to raise his NPH insulin dose in the AM from 48 to 50 units.  Leave the PM dose of NPH at 13 units.   Humalog insulin:  increase breakfast dose from 5 to 10, keep the lunch time dose at 5 and add 5 at dinner time.      All instructions were verified through the  and Shivani verbalized understanding.    No written instructions were given, as neither hSivani nor Lucian read [place] very well, and do not read English at all.     FOLLOW-UP:      Phone follow up in 2 weeks.  They say it will be a month or so before they can come in person as their daughter is their ride.  In person appointment scheduled in the first week of July.      Time spent with patient at today's visit was 40 minutes.      Any diabetes medication dose changes were made via the CDE Protocol and Collaborative Practice Agreement with [facility].  A copy of this encounter was provided to patient's referring provider.

## 2024-05-22 ENCOUNTER — VIRTUAL VISIT (OUTPATIENT)
Dept: EDUCATION SERVICES | Facility: CLINIC | Age: 69
End: 2024-05-22
Payer: COMMERCIAL

## 2024-05-22 DIAGNOSIS — E11.21 TYPE 2 DIABETES MELLITUS WITH DIABETIC NEPHROPATHY, WITH LONG-TERM CURRENT USE OF INSULIN (H): Primary | Chronic | ICD-10-CM

## 2024-05-22 DIAGNOSIS — Z79.4 TYPE 2 DIABETES MELLITUS WITH DIABETIC NEPHROPATHY, WITH LONG-TERM CURRENT USE OF INSULIN (H): Primary | Chronic | ICD-10-CM

## 2024-05-22 PROCEDURE — T1013 SIGN LANG/ORAL INTERPRETER: HCPCS | Mod: U4

## 2024-05-22 PROCEDURE — 99207 PR NO CHARGE LOS: CPT | Mod: 93 | Performed by: REGISTERED NURSE

## 2024-05-22 NOTE — NURSING NOTE
Is the patient currently in the state of [place]? YES    Visit mode:TELEPHONE    If the visit is dropped, the patient can be reconnected by: TELEPHONE VISIT: Phone number: [phone]    Will anyone else be joining the visit? NO  (If patient encounters technical issues they should call [phone])    How would you like to obtain your AVS? MyChart    Are changes needed to the allergy or medication list?  PT is not taking Trulicity, lisinopril, senna is PRN    Are refills needed on medications prescribed by this physician? NO    Reason for visit: Consult    Jahaira KNOX

## 2024-05-22 NOTE — PROGRESS NOTES
"Virtual Visit Details    Type of service:  Telephone Visit   Phone call duration: 23 minutes   Originating Location (pt. Location): Home    Distant Location (provider location):  On-site    Diabetes Self-Management Education & Support Virtual Visit- Short    Lucian Oliveira contacted today for education related to Type 2 diabetes    Patient is being treated with:  MDI Insulin    Year of diagnosis: he thinks he has had diabetes for around 20 years.   Referring provider:  Marisabel Prieto PA-C  Living Situation: Lives with his wife, Shivani  Employment: Not employed.     Potential Barriers to Control:  poor literacy skills in English and Zambian.  Speaks and understands very limited English.  His wife, Shivani seems to be in charge of his diabetes management.       Diabetes Complications:  NPDR, CKD, CAD/CHF with heart transplant in 2020.     Purpose of today's visit:   Follow up DSME and insulin management.    Subjective/Objective:  Called on the phone using .    He is currently wearing the Dexcom G7 continuous glucose monitor (CGM), however we don't have access to his data, as he is using the medical  and they have no way of uploading the  at home.    He states \"everything is going well\"     Current insulin regimen is as follows  NPH insulin 13 units at bedtime                      50 units in the AM  Novolog:  10 units with breakfast, 5 units with both lunch and dinner.      States that the sensor is \"screwy\"--giving him widely different numbers than his fingerstick glucose.    Discussed and explained how to calibrate the sensor using a blood glucose test.  Emphasized that calibrations should be done when the glucose is somewhere to 100-200 mg/dL with no up or down arrows.      Denies having any hypoglycemia.      We have an appointment set up at the beginning of July, in person.  He is unable to come in before then because apparently they rely on their daughter for transporation " and she is unavailable until then.      Follow-up:  July 2.       Time spent in this visit: less than 30 minutes     Any diabetes medication dose changes were made via the CDE Protocol and Collaborative Practice Agreement. A copy of this encounter was provided to the patient's referring provider.

## 2024-05-24 DIAGNOSIS — Z94.1 HEART REPLACED BY TRANSPLANT (H): ICD-10-CM

## 2024-05-24 DIAGNOSIS — Z94.1 HEART TRANSPLANT, ORTHOTOPIC, STATUS (H): ICD-10-CM

## 2024-05-24 RX ORDER — ROSUVASTATIN CALCIUM 20 MG/1
20 TABLET, COATED ORAL DAILY
Qty: 90 TABLET | Refills: 3 | Status: SHIPPED | OUTPATIENT
Start: 2024-05-24

## 2024-05-24 RX ORDER — MYCOPHENOLATE MOFETIL 500 MG/1
1500 TABLET ORAL 2 TIMES DAILY
Qty: 180 TABLET | Refills: 11 | Status: SHIPPED | OUTPATIENT
Start: 2024-05-24

## 2024-06-17 DIAGNOSIS — E11.3513 TYPE 2 DIABETES MELLITUS WITH PROLIFERATIVE RETINOPATHY OF BOTH EYES AND MACULAR EDEMA, UNSPECIFIED WHETHER LONG TERM INSULIN USE (H): Primary | ICD-10-CM

## 2024-07-01 ENCOUNTER — OFFICE VISIT (OUTPATIENT)
Dept: OPHTHALMOLOGY | Facility: CLINIC | Age: 69
End: 2024-07-01
Attending: OPHTHALMOLOGY
Payer: COMMERCIAL

## 2024-07-01 DIAGNOSIS — E11.3513 TYPE 2 DIABETES MELLITUS WITH PROLIFERATIVE RETINOPATHY OF BOTH EYES AND MACULAR EDEMA, UNSPECIFIED WHETHER LONG TERM INSULIN USE (H): Primary | ICD-10-CM

## 2024-07-01 PROCEDURE — 99214 OFFICE O/P EST MOD 30 MIN: CPT | Performed by: OPHTHALMOLOGY

## 2024-07-01 PROCEDURE — 92134 CPTRZ OPH DX IMG PST SGM RTA: CPT | Performed by: OPHTHALMOLOGY

## 2024-07-01 PROCEDURE — G0463 HOSPITAL OUTPT CLINIC VISIT: HCPCS | Mod: 25 | Performed by: OPHTHALMOLOGY

## 2024-07-01 PROCEDURE — 92235 FLUORESCEIN ANGRPH MLTIFRAME: CPT | Performed by: OPHTHALMOLOGY

## 2024-07-01 ASSESSMENT — VISUAL ACUITY
OS_SC: HM
METHOD: SNELLEN - LINEAR
OD_SC+: -1
OD_SC: 20/40

## 2024-07-01 ASSESSMENT — CONF VISUAL FIELD
OD_INFERIOR_TEMPORAL_RESTRICTION: 0
OD_SUPERIOR_TEMPORAL_RESTRICTION: 0
OD_INFERIOR_NASAL_RESTRICTION: 0
OS_SUPERIOR_NASAL_RESTRICTION: 1
OD_SUPERIOR_NASAL_RESTRICTION: 0
OS_INFERIOR_NASAL_RESTRICTION: 1
OS_INFERIOR_TEMPORAL_RESTRICTION: 1
OD_NORMAL: 1

## 2024-07-01 ASSESSMENT — SLIT LAMP EXAM - LIDS
COMMENTS: NORMAL
COMMENTS: NORMAL

## 2024-07-01 ASSESSMENT — EXTERNAL EXAM - LEFT EYE: OS_EXAM: NORMAL

## 2024-07-01 ASSESSMENT — TONOMETRY
OS_IOP_MMHG: 17
IOP_METHOD: TONOPEN
OD_IOP_MMHG: 14

## 2024-07-01 ASSESSMENT — CUP TO DISC RATIO: OD_RATIO: 0.25

## 2024-07-01 ASSESSMENT — EXTERNAL EXAM - RIGHT EYE: OD_EXAM: NORMAL

## 2024-07-01 NOTE — NURSING NOTE
Chief Complaints and History of Present Illnesses   Patient presents with    Follow Up     # history of Proliferative Diabetic Retinopathy, right eye    # with Diabetic macular edema right eye   # Proliferative Diabetic Retinopathy, left eye     Chief Complaint(s) and History of Present Illness(es)       Follow Up              Course: stable    Associated symptoms: Negative for flashes and floaters    Comments: # history of Proliferative Diabetic Retinopathy, right eye    # with Diabetic macular edema right eye   # Proliferative Diabetic Retinopathy, left eye              Comments    Pt reports stable vision and has no new concerns.   Lab Results       Component                Value               Date                       A1C                      7.6                 04/16/2024                 A1C                      6.9                 01/16/2024                 A1C                      8.1                 11/07/2023                 A1C                      8.7                 07/17/2023                 A1C                      8.3                 02/02/2023                 A1C                      8.4                 09/12/2022                 A1C                      7.4                 03/14/2022                 A1C                      7.3                 11/17/2021                 A1C                      6.7                 01/14/2021            Liyah Zuniga OA 8:50 AM July 1, 2024

## 2024-07-01 NOTE — PROGRESS NOTES
CC: follow up proliferative diabetic retinopathy    Interval:   Patient feels vision is stable each eye in the last several weeks. No flashes or floaters.  Ocular meds:   - Prednisolone daily left eye & ketorolac once daily in right eye only. Latanoprost both eyes once daily.     HPI: 68 year old man PMHx of CAD (s/p 3v CABG 2008), ischemic CM, now s/p orthotopic heart transplant 07/2020, CKD, HTN, HLD, obesity, CHANTEL and IDDM2 with history of PDR both eyes who presented to [facility] in 05/17/2019 with bilateral vitreous hemorrhages.      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 Good foveal contour  Left eye: Irregular retina; ERM; nasal to fovea with large bullous edema-largely stable    FA 10/26/23  right eye: PRP scars staining. Mild late macula edema;  Foci of NV superonasal- stable   Left eye : staining of the Chorioretinal  scars     optos consistent with exam 1/4/24    DVF 10/26/23   Right eye: H 110 V 75  Left eye: H only sup area of 50 degrees V70     Assessment and Plan    # history of Proliferative Diabetic Retinopathy, right eye    # with Diabetic macular edema right eye   Optical Coherence Tomography 07/01/24  with mild worsening of mild cystoid macular edema  Currently off of eyedrops; was on ketorolac and Predforte  in the past  - Fluorescein angiography with persistent neovascularization elsewhere 07/01/24    Status post laser filling 10/26/23 no complications     Consider avastin if Diabetic macular edema worsen    # Vitreous hemorrhage, right eye -  Resolved  (onset 4/2021)   - Responded to multiple Avastin, nearly resolved 08/04/2022   - R/B/A Fill-in PRP right eye 08/24/2022: #764 spots   -  1.25.23 last Avastin right eye     # Proliferative Diabetic Retinopathy, left eye     - NVI, left eye 6/2/2022   -  Gonio 6/2/2022 broad PAS superiorly ~3 clock hours, narrow PAS inferiorly <1 clock hour, no NVA   - Regressing 08/04/2022   - s/p PRP 6/5/2019   - s/p avastin last injection 06/02/2022   - 03/09/23 VA stable CF2' - continue to observe given chronic Retinal detachment   # history of Funnel RD left eye   - progressed to funnel RD after loss to follow up given heart surgeries     - w retina folded on itself under SO   - guarded prognosis previously discussed   - Drops as below    # s/p CEIOL OD 2/6/23.  doing well.     # Ocuar HTN, both eyes    - 06/15/23 IOP 24/20.   -07/27/23 intraocular pressure under good control on latanoprost at bedtime both eyes   03/28/24 18 both eyes     PLAN:   - Continue latanoprost at bedtime both eyes   - follow up in 2 months with Optical Coherence Tomography - if worsening Diabetic macular edema will restart anti-vegf   Retina detachment precautions were discussed with the patient (presence or increased in flashes, floaters or a curtain in the visual field) and was asked to return if any of the those occur      ~~~~~~~~~~~~~~~~~~~~~~~~~~~~~~~~~~   Complete documentation of historical and exam elements from today's encounter can be found in the full encounter summary report (not reduplicated in this progress note).  I personally obtained the chief complaint(s) and history of present illness.  I confirmed and edited as necessary the review of systems, past medical/surgical history, family history, social history, and examination findings as documented by others; and I examined the patient myself.  I personally reviewed the relevant tests, images, and reports as documented above.  I formulated and edited as necessary the assessment and plan and discussed the findings and management plan with the patient and family    Triny Bunch MD  Professor of Ophthalmology  Vitreo-Retinal surgeon   Department of Ophthalmology and Visual Neurosciences   [facility]  Phone: [phone]   Fax: [phone]

## 2024-07-02 ENCOUNTER — ALLIED HEALTH/NURSE VISIT (OUTPATIENT)
Dept: EDUCATION SERVICES | Facility: CLINIC | Age: 69
End: 2024-07-02
Payer: COMMERCIAL

## 2024-07-02 ENCOUNTER — PATIENT OUTREACH (OUTPATIENT)
Dept: CARE COORDINATION | Facility: CLINIC | Age: 69
End: 2024-07-02

## 2024-07-02 DIAGNOSIS — Z79.4 TYPE 2 DIABETES MELLITUS WITH DIABETIC NEPHROPATHY, WITH LONG-TERM CURRENT USE OF INSULIN (H): Primary | ICD-10-CM

## 2024-07-02 DIAGNOSIS — E11.3513 TYPE 2 DIABETES MELLITUS WITH PROLIFERATIVE RETINOPATHY OF BOTH EYES AND MACULAR EDEMA, UNSPECIFIED WHETHER LONG TERM INSULIN USE (H): ICD-10-CM

## 2024-07-02 DIAGNOSIS — E11.21 TYPE 2 DIABETES MELLITUS WITH DIABETIC NEPHROPATHY, WITH LONG-TERM CURRENT USE OF INSULIN (H): Primary | ICD-10-CM

## 2024-07-02 PROCEDURE — G0108 DIAB MANAGE TRN  PER INDIV: HCPCS | Performed by: REGISTERED NURSE

## 2024-07-02 PROCEDURE — T1013 SIGN LANG/ORAL INTERPRETER: HCPCS | Mod: U4

## 2024-07-02 RX ORDER — INSULIN LISPRO 100 [IU]/ML
INJECTION, SOLUTION INTRAVENOUS; SUBCUTANEOUS
Qty: 90 ML | Refills: 3 | Status: SHIPPED | OUTPATIENT
Start: 2024-07-02

## 2024-07-02 NOTE — PROGRESS NOTES
Anemia Management Note - Reminder     Follow-up with anemia management service:    Called Lucian to touch [facility]. He's been feeling well since we last talked in early May 2024. He continues to take the oral iron BID and denies having any side effects.  He prefers to wait on getting labs done until his appt later this month. Writer will review results and OV notes and will follow up with him with plan. He agreed to the plan.  He was driving and could not take phone number so requested a call back later today with .           Latest Ref Rng & Units 11/22/2023 12/11/2023 12/26/2023 1/10/2024 4/1/2024 4/16/2024 4/29/2024   Anemia   DANDY Dose  40mcg 40mcg 40mcg 40mcg      Hemoglobin 13.3 - 17.7 g/dL 9.0  8.0  8.1  9.1  11.6  10.2  10.2    TSAT 15 - 46 %  23   30  30   26    Ferritin 31 - 409 ng/mL  304   277  398   284         Follow-up call date: later today with phone number in event concerns/questions come up this month prior to his appt 7/22.  Addendum [phone]-called pt with  and gave him call back number and reviewed plan    Carrie Leopold, RN BSN  Anemia Services  [facility]  [email or web address]  Office: [phone]  Fax [phone]

## 2024-07-02 NOTE — PROGRESS NOTES
"Diabetes Self-Management Education & Support    Lucian Oliveira presents today for education related to Type 2 diabetes    Patient is being treated with:  MDI Insulin  He is accompanied by self, with wife Shivani.    Visit was conducted with the assistance of a  over the phone.     Year of diagnosis: he thinks he has had diabetes for around 20 years.   Referring provider:  Marisabel Prieto PA-C  Living Situation: Lives with his wife, Shivani  Employment: Not employed.     Potential Barriers to Control:  poor literacy skills in English and [place].  Speaks and understands very limited English.  His wife, Shivani seems to be in charge of his diabetes management.       Diabetes Complications:  NPDR, CKD, CAD/CHF with heart transplant in 2020.     PATIENT CONCERNS/REASON FOR REFERRAL Follow Up DSME with a focus on insulin management.      ASSESSMENT:    Taking Medication:     Current Diabetes Management per Patient:  Taking diabetes medications:    NPH Humulin:  13 units at bedtime, 48 units in the morning.     Monitoring    Patient glucose self monitoring as follows: continuously using a continuous glucose monitor (CGM)  BG meter: Unknown  CGM: Dexcom G7  BG results: See report below:                  Patient's most recent   Lab Results   Component Value Date    A1C 7.6 04/16/2024    A1C 8.7 07/17/2023    A1C 6.7 01/14/2021      Patient's A1C goal: <8.0    Activity: no regular exercise program    Healthy Eating:  Eats 3 meals per day.  Breakfast is the largest meal, lunch is also larger, dinner is the smallest meal.  Lucian does not carb count carbohydrates and I don't think that this is a realistic goal.  He does eat fairly consistently, however, which helps with a fixed dose regimen.       Problem Solving:      Patient is at risk of hypoglycemia?: YES-He thinks that a blood glucose of  mg/dL is \"low\" but doesn't feel symptoms until his glucose is under 80 mg/dL.   Hospitalizations for hyper or " hypoglycemia: No    EDUCATION and INSTRUCTION PROVIDED AT THIS VISIT:       Reviewed his Dexcom report today and questioned him about what he is eating, and how much insulin he is taking.  Discovered today that he is making decisions to give or withhold his rapid acting insulin based on his pre-meal glucose.  Discussed at length the need to take his rapid acting insulin before each meal, regardless of what his pre-meal glucose reading is.  He verbalized understanding.      At last visit (May 22nd), he was directed to increase his NPH insulin to 50 units in the AM, however this has not happened.      Today, it appears that all of his post-prandial glucoses are above target.  Instructed today to keep his breakfast dose at 10 units, increase his lunchtime dose to 10 units and keep his dinner dose at 5 units.  Also asked them to increase the NPH insulin in the morning from 48 units to 50 units.  He and Shivani verbalized understanding.      I think it would be helpful to transition him from 2 injections of NPH insulin to 1 injection of a long acting insulin.  My preference would be Tresiba insulin, but unsure if this is covered.  Will consult the pharmacy liasions to help determine this.  Once this is done, I think he would benefit significantly from use of the Cequr device, which would reduce the number of injections he needs to do, and may increase compliance with covering all his food.  I think that he is probably not covering snacks, which is contributing to his glucoses being above target throughout the day.      Patient-stated goal written and given to Lucian Oliveira.  Verbalized and demonstrated understanding of instructions.     PLAN:    See patient instructions  AVS printed and given to patient    FOLLOW-UP:      Follow up in one month--August 6 at 0915.  Time spent with patient at today's visit was 60 minutes.      Any diabetes medication dose changes were made via the CDE Protocol and Collaborative  Practice Agreement with [place] and  [facility].  A copy of this encounter was provided to patient's referring provider.

## 2024-07-03 DIAGNOSIS — E11.65 TYPE 2 DIABETES MELLITUS WITH HYPERGLYCEMIA, WITH LONG-TERM CURRENT USE OF INSULIN (H): ICD-10-CM

## 2024-07-03 DIAGNOSIS — Z79.4 TYPE 2 DIABETES MELLITUS WITH HYPERGLYCEMIA, WITH LONG-TERM CURRENT USE OF INSULIN (H): ICD-10-CM

## 2024-07-06 DIAGNOSIS — E11.65 TYPE 2 DIABETES MELLITUS WITH HYPERGLYCEMIA, WITH LONG-TERM CURRENT USE OF INSULIN (H): ICD-10-CM

## 2024-07-06 DIAGNOSIS — Z79.4 TYPE 2 DIABETES MELLITUS WITH HYPERGLYCEMIA, WITH LONG-TERM CURRENT USE OF INSULIN (H): ICD-10-CM

## 2024-07-08 NOTE — TELEPHONE ENCOUNTER
insulin pen needle (32G X 4 MM) 32G X 4 MM miscellaneous 450 each 3 1/17/2023     Last Office Visit: 4/16/24  Future Office visit:   9/17/24    Refill protocol passed    Paola Hauser RN  [facility] Red Flag Triage/MRT

## 2024-07-10 NOTE — TELEPHONE ENCOUNTER
Current rx  insulin pen needle (32G X 4 MM) 32G X 4 MM miscellaneous 600 each 2 7/8/2024 -- No   Sig: Use 5-6 pen needles daily or as directed.

## 2024-07-14 DIAGNOSIS — E11.21 TYPE 2 DIABETES MELLITUS WITH DIABETIC NEPHROPATHY, WITH LONG-TERM CURRENT USE OF INSULIN (H): ICD-10-CM

## 2024-07-14 DIAGNOSIS — Z79.4 TYPE 2 DIABETES MELLITUS WITH DIABETIC NEPHROPATHY, WITH LONG-TERM CURRENT USE OF INSULIN (H): ICD-10-CM

## 2024-07-15 NOTE — PROGRESS NOTES
Diabetes Educator Note:     Called Lucian and Shivani today to inquire as to whether or not they had picked up the Tresiba insulin.  They had.    Repeated instructions to take the NPH insulin they have been using tonight as usual (13 units).   Then start the Tresiba insulin in the morning--60 units.  Stop taking all NPH insulin.    Take 60 units of Tresiba every AM.  Keep the rapid acting insulin dosing as it currently stands.     Follow up appointment in 3 weeks.   Instructed to call if they start to notice more hypoglycemia.     Bre Mi, CURRYN, RN, [facility]  Certified Diabetes Care and   [facility]  Phone [phone]  Hours:  Tuesday:       In Clinic and Virtual Visits  8am to 4pm              Wednesday:  In Clinic and Virtual Visits  8am to 4pm               Thursday:     Virtural  Visits only             8am to 4pm

## 2024-07-16 NOTE — TELEPHONE ENCOUNTER
empagliflozin (JARDIANCE) 10 MG TABS tablet   Disp-90 tablet, R-1   Start: 05/06/2024 4/16/2024  [facility] [place]    Marisabel Prieto PA-C  Endocrinology, Diabetes, and Metabolism    Routed because:   Sodium Glucose Co-Transport Inhibitor Agents [id number]/14/2024 03:58 AM   Protocol Details Has GFR on file in past 12 months and most recent value is normal

## 2024-07-17 ENCOUNTER — TELEPHONE (OUTPATIENT)
Dept: TRANSPLANT | Facility: CLINIC | Age: 69
End: 2024-07-17
Payer: COMMERCIAL

## 2024-07-17 ENCOUNTER — APPOINTMENT (OUTPATIENT)
Dept: INTERPRETER SERVICES | Facility: CLINIC | Age: 69
End: 2024-07-17
Payer: COMMERCIAL

## 2024-07-17 DIAGNOSIS — Z94.1 HEART REPLACED BY TRANSPLANT (H): ICD-10-CM

## 2024-07-17 DIAGNOSIS — Z79.4 TYPE 2 DIABETES MELLITUS WITH DIABETIC NEPHROPATHY, WITH LONG-TERM CURRENT USE OF INSULIN (H): Primary | ICD-10-CM

## 2024-07-17 DIAGNOSIS — Z13.220 LIPID SCREENING: ICD-10-CM

## 2024-07-17 DIAGNOSIS — Z94.1 HEART REPLACED BY TRANSPLANT (H): Primary | ICD-10-CM

## 2024-07-17 DIAGNOSIS — Z12.5 PROSTATE CANCER SCREENING: ICD-10-CM

## 2024-07-17 DIAGNOSIS — E11.21 TYPE 2 DIABETES MELLITUS WITH DIABETIC NEPHROPATHY, WITH LONG-TERM CURRENT USE OF INSULIN (H): Primary | ICD-10-CM

## 2024-07-17 RX ORDER — ASPIRIN 325 MG
325 TABLET ORAL ONCE
OUTPATIENT
Start: 2024-07-17 | End: 2024-07-17

## 2024-07-17 RX ORDER — LIDOCAINE 40 MG/G
CREAM TOPICAL
OUTPATIENT
Start: 2024-07-17

## 2024-07-17 RX ORDER — ASPIRIN 81 MG/1
243 TABLET, CHEWABLE ORAL ONCE
OUTPATIENT
Start: 2024-07-17

## 2024-07-17 RX ORDER — POTASSIUM CHLORIDE 1500 MG/1
40 TABLET, EXTENDED RELEASE ORAL
OUTPATIENT
Start: 2024-07-17

## 2024-07-17 RX ORDER — SODIUM CHLORIDE 9 MG/ML
INJECTION, SOLUTION INTRAVENOUS CONTINUOUS
OUTPATIENT
Start: 2024-07-17

## 2024-07-17 RX ORDER — POTASSIUM CHLORIDE 1500 MG/1
20 TABLET, EXTENDED RELEASE ORAL
OUTPATIENT
Start: 2024-07-17

## 2024-07-17 NOTE — TELEPHONE ENCOUNTER
Pre-procedure instructions - Coronary Angiogram  Patient Education    Please plan on being at the hospital all day.  At any time, emergencies and/or urgent cases may come up which could delay the start of your procedure.    Pre-procedure instructions - Coronary Angiogram  Shower in the evening before or the morning of the procedure  No solid food for 8 hours prior and nothing to drink 2 hours prior to arrival time  You can take your morning medications (except for diabetic and blood thinners) with sips of water.  Take 325 mg of Aspirin the night before and the morning of your procedure.  You will need to arrange a ride to drop you off and , as you will be unable to drive home. Prior to discharge you may be required to lay flat for approximately 2-6 hours in the recovery unit to ensure proper clotting of the artery. Please note: You cannot take an Uber/Taxi/etc unless you are accompanied by someone.              Diabetic Medication Instructions  Hold oral diabetic medication in morning of your procedure and for 48 hours after IV contrast is given  Typical instructions for insulin diabetic medication holding are below. However, please reach out to your Primary Care Provider or Endocrinologist for specific instructions  DO NOT take any oral diabetic medication, short-acting diabetes medications/insulin, humalog or regular insulin the morning of your test  Take   dose of long-acting insulin (Lantus, Levemir) the day of your test  Remember to bring your glucometer and insulin with you to take after your test if needed  GLP-1 Agonists Instructions  DO NOT take injectable GLP-1 agonists semaglutide (Ozempic, Wegovy), dulaglutide (Trulicity), exenatide ER (Bydureon), tirzepatide (Mounjaro), or oral semaglutide (Rybelsus) for 7 days prior your procedure  Hold once daily injectable GLP-1 agonists exenatide (Byetta), liraglutide (Saxenda, Victoza), lixisenatide (Soligua) the day before and day of your procedure                   Anticoagulation Medication Instructions   NA  Write N/A if not currently taking

## 2024-07-17 NOTE — TELEPHONE ENCOUNTER
Pt called using .    Pre procedure instructions reviewed with pt for next week's angiogram.    Pt states he is NOT taking Trulicity and has not taken it for several months.    Reviewed times/locations with pt for appts.    Reviewed NPO and need for  as well as trough for drug levels.  Pt states understanding instructions.      Pre-procedure instructions - Coronary Angiogram  Patient Education    Please plan on being at the hospital all day.  At any time, emergencies and/or urgent cases may come up which could delay the start of your procedure.    Pre-procedure instructions - Coronary Angiogram  Shower in the evening before or the morning of the procedure  No solid food for 8 hours prior and nothing to drink 2 hours prior to arrival time  You can take your morning medications (except for diabetic and blood thinners) with sips of water.  Take 325 mg of Aspirin the night before and the morning of your procedure.  You will need to arrange a ride to drop you off and , as you will be unable to drive home. Prior to discharge you may be required to lay flat for approximately 2-6 hours in the recovery unit to ensure proper clotting of the artery. Please note: You cannot take an Uber/Taxi/etc unless you are accompanied by someone.              Diabetic Medication Instructions  Hold oral diabetic medication in morning of your procedure and for 48 hours after IV contrast is given  Typical instructions for insulin diabetic medication holding are below. However, please reach out to your Primary Care Provider or Endocrinologist for specific instructions  DO NOT take any oral diabetic medication, short-acting diabetes medications/insulin, humalog or regular insulin the morning of your test  Take   dose of long-acting insulin (Lantus, Levemir) the day of your test  Remember to bring your glucometer and insulin with you to take after your test if needed  GLP-1 Agonists Instructions  DO NOT take  injectable GLP-1 agonists semaglutide (Ozempic, Wegovy), dulaglutide (Trulicity), exenatide ER (Bydureon), tirzepatide (Mounjaro), or oral semaglutide (Rybelsus) for 7 days prior your procedure  Hold once daily injectable GLP-1 agonists exenatide (Byetta), liraglutide (Saxenda, Victoza), lixisenatide (Soligua) the day before and day of your procedure                  Anticoagulation Medication Instructions   NA  Write N/A if not currently taking

## 2024-07-18 DIAGNOSIS — Z94.1 HEART REPLACED BY TRANSPLANT (H): ICD-10-CM

## 2024-07-18 RX ORDER — SIROLIMUS 0.5 MG/1
3 TABLET, FILM COATED ORAL DAILY
Qty: 180 TABLET | Refills: 11 | Status: SHIPPED | OUTPATIENT
Start: 2024-07-18

## 2024-07-19 DIAGNOSIS — N18.4 CKD (CHRONIC KIDNEY DISEASE) STAGE 4, GFR 15-29 ML/MIN (H): ICD-10-CM

## 2024-07-19 DIAGNOSIS — N18.4 ANEMIA OF CHRONIC RENAL FAILURE, STAGE 4 (SEVERE) (H): ICD-10-CM

## 2024-07-19 DIAGNOSIS — D63.1 ANEMIA OF CHRONIC RENAL FAILURE, STAGE 4 (SEVERE) (H): ICD-10-CM

## 2024-07-19 DIAGNOSIS — Z94.1 HEART REPLACED BY TRANSPLANT (H): ICD-10-CM

## 2024-07-19 RX ORDER — FERROUS SULFATE 325(65) MG
325 TABLET ORAL 2 TIMES DAILY WITH MEALS
Qty: 180 TABLET | Refills: 3 | Status: SHIPPED | OUTPATIENT
Start: 2024-07-19

## 2024-07-22 ENCOUNTER — OFFICE VISIT (OUTPATIENT)
Dept: CARDIOLOGY | Facility: CLINIC | Age: 69
End: 2024-07-22
Attending: INTERNAL MEDICINE
Payer: COMMERCIAL

## 2024-07-22 ENCOUNTER — LAB (OUTPATIENT)
Dept: LAB | Facility: CLINIC | Age: 69
End: 2024-07-22
Attending: INTERNAL MEDICINE
Payer: COMMERCIAL

## 2024-07-22 ENCOUNTER — ANCILLARY PROCEDURE (OUTPATIENT)
Dept: GENERAL RADIOLOGY | Facility: CLINIC | Age: 69
End: 2024-07-22
Attending: INTERNAL MEDICINE
Payer: COMMERCIAL

## 2024-07-22 ENCOUNTER — APPOINTMENT (OUTPATIENT)
Dept: MEDSURG UNIT | Facility: CLINIC | Age: 69
End: 2024-07-22
Payer: COMMERCIAL

## 2024-07-22 VITALS
SYSTOLIC BLOOD PRESSURE: 155 MMHG | DIASTOLIC BLOOD PRESSURE: 78 MMHG | OXYGEN SATURATION: 99 % | BODY MASS INDEX: 30.29 KG/M2 | WEIGHT: 182 LBS | HEART RATE: 77 BPM

## 2024-07-22 DIAGNOSIS — D64.9 ANEMIA: ICD-10-CM

## 2024-07-22 DIAGNOSIS — Z94.1 HEART REPLACED BY TRANSPLANT (H): ICD-10-CM

## 2024-07-22 DIAGNOSIS — Z79.4 TYPE 2 DIABETES MELLITUS WITH DIABETIC NEPHROPATHY, WITH LONG-TERM CURRENT USE OF INSULIN (H): ICD-10-CM

## 2024-07-22 DIAGNOSIS — Z94.1 HEART REPLACED BY TRANSPLANT (H): Primary | ICD-10-CM

## 2024-07-22 DIAGNOSIS — E11.21 TYPE 2 DIABETES MELLITUS WITH DIABETIC NEPHROPATHY, WITH LONG-TERM CURRENT USE OF INSULIN (H): ICD-10-CM

## 2024-07-22 DIAGNOSIS — Z13.220 LIPID SCREENING: ICD-10-CM

## 2024-07-22 DIAGNOSIS — Z12.5 PROSTATE CANCER SCREENING: ICD-10-CM

## 2024-07-22 LAB
ALBUMIN SERPL BCG-MCNC: 3.8 G/DL (ref 3.5–5.2)
ALP SERPL-CCNC: 119 U/L (ref 40–150)
ALT SERPL W P-5'-P-CCNC: 8 U/L (ref 0–70)
ANION GAP SERPL CALCULATED.3IONS-SCNC: 10 MMOL/L (ref 7–15)
AST SERPL W P-5'-P-CCNC: 15 U/L (ref 0–45)
BASOPHILS # BLD AUTO: 0 10E3/UL (ref 0–0.2)
BASOPHILS NFR BLD AUTO: 0 %
BILIRUB SERPL-MCNC: 0.3 MG/DL
BUN SERPL-MCNC: 34.8 MG/DL (ref 8–23)
CALCIUM SERPL-MCNC: 8.9 MG/DL (ref 8.8–10.4)
CHLORIDE SERPL-SCNC: 105 MMOL/L (ref 98–107)
CHOLEST SERPL-MCNC: 133 MG/DL
CK SERPL-CCNC: 118 U/L (ref 39–308)
CMV DNA SPEC NAA+PROBE-ACNC: NOT DETECTED IU/ML
CREAT SERPL-MCNC: 2.25 MG/DL (ref 0.67–1.17)
EBV DNA SERPL NAA+PROBE-ACNC: NOT DETECTED IU/ML
EGFRCR SERPLBLD CKD-EPI 2021: 31 ML/MIN/1.73M2
EOSINOPHIL # BLD AUTO: 0.1 10E3/UL (ref 0–0.7)
EOSINOPHIL NFR BLD AUTO: 1 %
ERYTHROCYTE [DISTWIDTH] IN BLOOD BY AUTOMATED COUNT: 14 % (ref 10–15)
FASTING STATUS PATIENT QL REPORTED: YES
FASTING STATUS PATIENT QL REPORTED: YES
FERRITIN SERPL-MCNC: 278 NG/ML (ref 31–409)
GLUCOSE SERPL-MCNC: 99 MG/DL (ref 70–99)
HBA1C MFR BLD: 7.3 %
HCO3 SERPL-SCNC: 26 MMOL/L (ref 22–29)
HCT VFR BLD AUTO: 33.8 % (ref 40–53)
HDLC SERPL-MCNC: 31 MG/DL
HGB BLD-MCNC: 10.7 G/DL (ref 13.3–17.7)
IMM GRANULOCYTES # BLD: 0 10E3/UL
IMM GRANULOCYTES NFR BLD: 1 %
IRON BINDING CAPACITY (ROCHE): 200 UG/DL (ref 240–430)
IRON SATN MFR SERPL: 26 % (ref 15–46)
IRON SERPL-MCNC: 52 UG/DL (ref 61–157)
LDLC SERPL CALC-MCNC: 49 MG/DL
LVEF ECHO: NORMAL
LYMPHOCYTES # BLD AUTO: 1.3 10E3/UL (ref 0.8–5.3)
LYMPHOCYTES NFR BLD AUTO: 20 %
MAGNESIUM SERPL-MCNC: 2.2 MG/DL (ref 1.7–2.3)
MCH RBC QN AUTO: 28.8 PG (ref 26.5–33)
MCHC RBC AUTO-ENTMCNC: 31.7 G/DL (ref 31.5–36.5)
MCV RBC AUTO: 91 FL (ref 78–100)
MONOCYTES # BLD AUTO: 0.5 10E3/UL (ref 0–1.3)
MONOCYTES NFR BLD AUTO: 8 %
NEUTROPHILS # BLD AUTO: 4.7 10E3/UL (ref 1.6–8.3)
NEUTROPHILS NFR BLD AUTO: 70 %
NONHDLC SERPL-MCNC: 102 MG/DL
NRBC # BLD AUTO: 0 10E3/UL
NRBC BLD AUTO-RTO: 0 /100
PHOSPHATE SERPL-MCNC: 3.1 MG/DL (ref 2.5–4.5)
PLATELET # BLD AUTO: 219 10E3/UL (ref 150–450)
POTASSIUM SERPL-SCNC: 3.6 MMOL/L (ref 3.4–5.3)
PROT SERPL-MCNC: 6.6 G/DL (ref 6.4–8.3)
PSA SERPL DL<=0.01 NG/ML-MCNC: 0.32 NG/ML (ref 0–6.5)
RBC # BLD AUTO: 3.71 10E6/UL (ref 4.4–5.9)
SIROLIMUS BLD-MCNC: 8.5 UG/L (ref 5–15)
SODIUM SERPL-SCNC: 141 MMOL/L (ref 135–145)
TME LAST DOSE: NORMAL H
TME LAST DOSE: NORMAL H
TRIGL SERPL-MCNC: 263 MG/DL
VIT B12 SERPL-MCNC: 278 PG/ML (ref 232–1245)
WBC # BLD AUTO: 6.6 10E3/UL (ref 4–11)

## 2024-07-22 PROCEDURE — 85025 COMPLETE CBC W/AUTO DIFF WBC: CPT | Performed by: PATHOLOGY

## 2024-07-22 PROCEDURE — 86352 CELL FUNCTION ASSAY W/STIM: CPT | Performed by: INTERNAL MEDICINE

## 2024-07-22 PROCEDURE — 93306 TTE W/DOPPLER COMPLETE: CPT | Mod: GC | Performed by: INTERNAL MEDICINE

## 2024-07-22 PROCEDURE — 83735 ASSAY OF MAGNESIUM: CPT | Performed by: PATHOLOGY

## 2024-07-22 PROCEDURE — 83540 ASSAY OF IRON: CPT | Performed by: PATHOLOGY

## 2024-07-22 PROCEDURE — 82728 ASSAY OF FERRITIN: CPT | Performed by: PATHOLOGY

## 2024-07-22 PROCEDURE — 71046 X-RAY EXAM CHEST 2 VIEWS: CPT | Performed by: RADIOLOGY

## 2024-07-22 PROCEDURE — 99215 OFFICE O/P EST HI 40 MIN: CPT | Mod: 25 | Performed by: INTERNAL MEDICINE

## 2024-07-22 PROCEDURE — 36415 COLL VENOUS BLD VENIPUNCTURE: CPT | Performed by: PATHOLOGY

## 2024-07-22 PROCEDURE — 82550 ASSAY OF CK (CPK): CPT | Performed by: PATHOLOGY

## 2024-07-22 PROCEDURE — G0463 HOSPITAL OUTPT CLINIC VISIT: HCPCS | Performed by: INTERNAL MEDICINE

## 2024-07-22 PROCEDURE — 80195 ASSAY OF SIROLIMUS: CPT | Performed by: INTERNAL MEDICINE

## 2024-07-22 PROCEDURE — 87799 DETECT AGENT NOS DNA QUANT: CPT | Performed by: INTERNAL MEDICINE

## 2024-07-22 PROCEDURE — 86833 HLA CLASS II HIGH DEFIN QUAL: CPT | Performed by: INTERNAL MEDICINE

## 2024-07-22 PROCEDURE — 99000 SPECIMEN HANDLING OFFICE-LAB: CPT | Performed by: PATHOLOGY

## 2024-07-22 PROCEDURE — 83036 HEMOGLOBIN GLYCOSYLATED A1C: CPT | Performed by: INTERNAL MEDICINE

## 2024-07-22 PROCEDURE — T1013 SIGN LANG/ORAL INTERPRETER: HCPCS | Mod: U4

## 2024-07-22 PROCEDURE — 80053 COMPREHEN METABOLIC PANEL: CPT | Performed by: PATHOLOGY

## 2024-07-22 PROCEDURE — 80061 LIPID PANEL: CPT | Performed by: PATHOLOGY

## 2024-07-22 PROCEDURE — 84100 ASSAY OF PHOSPHORUS: CPT | Performed by: PATHOLOGY

## 2024-07-22 PROCEDURE — G0103 PSA SCREENING: HCPCS | Performed by: PATHOLOGY

## 2024-07-22 PROCEDURE — 83550 IRON BINDING TEST: CPT | Performed by: PATHOLOGY

## 2024-07-22 PROCEDURE — 86832 HLA CLASS I HIGH DEFIN QUAL: CPT | Performed by: INTERNAL MEDICINE

## 2024-07-22 PROCEDURE — 82607 VITAMIN B-12: CPT | Performed by: INTERNAL MEDICINE

## 2024-07-22 ASSESSMENT — PAIN SCALES - GENERAL: PAINLEVEL: NO PAIN (0)

## 2024-07-22 NOTE — PATIENT INSTRUCTIONS
Patient Instructions  Make an appointment to see the Kidney doctor.  2. We will call you to schedule the stress test.  3. Will call you with remaining lab results.      Next transplant clinic appointment:  in one year for 5th annual  Next lab draw:   Coordinator will call with all pending results.     Please call transplant coordinator with any questions:    Christelle Pettit RN BSN   Post Heart Transplant Nurse Coordinator  [facility]  Questions: [phone]

## 2024-07-22 NOTE — NURSING NOTE
Transplant Coordinator Note    Reason for visit: 4th annual  Coordinator: Present   Caregiver:      Health concerns addressed today:  1.   2.   3.       Immunosuppressants:  MMF 1500 BID  Rapa 3mg daily Goal 6-8    Routine screenings:    Derm: May 2023   Dental: unable due to limited resources  Colonoscopy: done 2023- poor prep- pt to ask PCP about Cologuard   Prostate: PSA 0.17  [facility] Follows at [facility]   Flu Feb 2023  Pneumonia UTD  Shingrix 2/2-done     Labs:       Additional Notes:         Meds, labs, testing reviewed with patient  Medication record reviewed and reconciled  Questions and concerns addressed  Pt verbalized an understanding of plan of care.     Patient Instructions  Make an appointment to see the Kidney doctor.  2. We will call you to schedule the stress test.  3. Will call you with remaining lab results.      Next transplant clinic appointment:  in one year for 5th annual  Next lab draw:   Coordinator will call with all pending results.     Please call transplant coordinator with any questions:    Christelle Pettit RN BSN   Post Heart Transplant Nurse Coordinator  [facility]  Questions: [phone]

## 2024-07-22 NOTE — NURSING NOTE
Chief Complaint   Patient presents with    Follow Up     RTN HF: 64 year old male presents with systolic heart failure for follow up       Vitals were taken, medications reconciled.     Rodger Waldron, Visit Facilitator    8:50 AM

## 2024-07-22 NOTE — LETTER
7/22/2024      RE: Lucian Oliveira  [address] [facility] [address]       Dear Colleague,    Thank you for the opportunity to participate in the care of your patient, Lucian Oliveira, at the [facility] [place] [facility]. Please see a copy of my visit note below.      07/22/2024    ADULT HEART TRANSPLANT CLINIC    Mr. 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  He is doing well.  He has no specific complaints.  He walks regularly. He does have lower extremity swelling on and off.  It is more during the daytime.  He is compliant with his medications.  He states that his blood sugars are better controlled.   He is being currently followed by our endocrinologist at the .    Due to his worsening renal disease, we switched him from tacrolimus and rapamycin regimen to mycophenolate and rapamycin.  He is currently on mycophenolate 1500 mg twice a day and the rapamycin with a goal of 6-8.      PAST MEDICAL HISTORY:  Past Medical History:   Diagnosis Date     CAD (coronary artery disease)      CHF (congestive heart failure) (H)      CKD (chronic kidney disease), stage III (H)      Cortical cataract of both eyes      Diabetes (H)      Hyperlipidemia      Hypertension      Infection due to Streptococcus mitis group 09/23/2020     Ischemic cardiomyopathy      Obesity      CHANTEL (obstructive sleep apnea)     occas cpap     CHANTEL (obstructive sleep apnea)- severe (AHI 30)       Osteoarthritis          CURRENT MEDICATIONS:  Current Outpatient Medications   Medication Sig Dispense Refill     acetaminophen (TYLENOL) 325 MG tablet Take 3 tablets (975 mg) by mouth every 8 hours as needed for mild pain 60 tablet 3     Alcohol Swabs PADS Use to swab the area of the injection or sergio as directed   Per insurance coverage 100 each 0     aspirin (ASA) 81 MG chewable tablet Take 1 tablet (81 mg) by mouth daily (Patient taking differently: Take 81 mg by mouth every morning) 120 tablet 0     blood glucose (NO BRAND SPECIFIED) test strip Use to test blood sugar 4 times daily or as directed. 400 strip 3     blood glucose monitoring (ACCU-CHEK FELIPE SMARTVIEW) meter device kit Use to test blood sugar 3-4 times daily, as directed. 1 kit 0     blood glucose monitoring (SOFTCLIX) lancets 1 each 4 times daily Use to test blood sugars 2 times daily. 400 each 8     calcium carbonate-vitamin D (CALTRATE) 600-10 MG-MCG per tablet TAKE ONE TABLET BY MOUTH TWICE A DAY WITH MEALS 180 tablet 3     Continuous Glucose Sensor (DEXCOM G7 SENSOR) MISC Change every 10 days.  Please provide with a 3 month supply. 9 each 1     Dulaglutide (TRULICITY) 4.5 MG/0.5ML SOPN Inject 4.5 mg Subcutaneous once a week 7 mL 3     empagliflozin (JARDIANCE) 10 MG TABS tablet Take 1 tablet (10 mg) by mouth daily 90 tablet 1     ferrous sulfate (FEROSUL) 325 (65 Fe) MG tablet Take 1 tablet (325 mg) by mouth 2 times daily (with meals) 180 tablet 3     furosemide (LASIX) 20 MG tablet Take 20 mg by mouth daily       HUMALOG KWIKPEN 100 UNIT/ML soln Give 10 units with breakfast, 10 units with lunch,  and 5 units with dinner.  Average daily use is 25 units 90 mL 3     insulin degludec (TRESIBA) 200 UNIT/ML pen Inject 60 Units Subcutaneous every morning Order pen needles too 90 mL 11     insulin NPH (HUMULIN N KWIKPEN) 100 UNIT/ML injection GIve 50 units each morning and 13 at night subcutaneous 45 mL 1     insulin pen needle (32G X 4 MM) 32G X 4  MM miscellaneous Use 5-6 pen needles daily or as directed. 600 each 2     ketorolac (ACULAR) 0.5 % ophthalmic solution Place 1 drop into the right eye 2 times daily 10 mL 3     magnesium oxide 400 MG tablet Take 1 tablet (400 mg) by mouth 2 times daily 180 tablet 3     mycophenolate (GENERIC EQUIVALENT) 500 MG tablet Take 3 tablets (1,500 mg) by mouth 2 times daily 180 tablet 11     omega-3 acid ethyl esters (LOVAZA) 1 g capsule Take 1 capsule (1 g) by mouth daily 100 capsule 0     rosuvastatin (CRESTOR) 20 MG tablet Take 1 tablet (20 mg) by mouth daily 90 tablet 3     sirolimus (GENERIC EQUIVALENT) 0.5 MG tablet Take 6 tablets (3 mg) by mouth daily 180 tablet 11     sulfamethoxazole-trimethoprim (BACTRIM) 400-80 MG tablet Take 1 tablet by mouth three times a week 36 tablet 3     tamsulosin (FLOMAX) 0.4 MG capsule TAKE ONE CAPSULE BY MOUTH ONCE DAILY 90 capsule 3     senna-docusate (SENOKOT-S/PERICOLACE) 8.6-50 MG tablet Take 1 tablet by mouth 2 times daily as needed (hold for loose stools) (Patient not taking: Reported on 7/22/2024) 60 tablet 3     Current Facility-Administered Medications   Medication Dose Route Frequency Provider Last Rate Last Admin     bevacizumab (AVASTIN) intravitreal inj 1.25 mg  1.25 mg Intravitreal Q28 Days Triny Bunch MD           EXAM:  BP (!) 155/78 (BP Location: Right arm, Patient Position: Chair, Cuff Size: Adult Regular)   Pulse 77   Wt 82.6 kg (182 lb)   SpO2 99%   BMI 30.29 kg/m    He was awake, alert, oriented x3.  He was comfortable.  He was in no apparent distress.  He had no pallor, cyanosis or jaundice.  His neck exam revealed no jugular venous distention.  His carotids were 2+ bilaterally.  His pulse was regular and rhythm.  Cardiac auscultation revealed normal S1 and S2 with no murmur rub or gallop.  Auscultation of the lungs revealed equal air entry on both sides.  His abdomen was soft with normal bowels with no tenderness no rigidity no guarding.  He had  no focal neurological deficit.  His extremities showed no edema.    Testing:    Echo (07/2024):  Global and regional left ventricular function is normal with an EF of 55-60%.  Right ventricular function, chamber size, wall motion, and thickness are  normal.  Both atria appear normal.  No significant valvular abnormalities present.  The aortic root and visualized thoracic aorta are normal.  The inferior vena cava is normal.  No pericardial effusion is present.     Compared to prior TTE 7/17/23, no significant changes noted.    RHC (07/2023)  BSA 1.89 m2  BP  120/69/91 mmHg  RA -/-/14 mmHg  RV 38/14/- mmHg  PA 39/23/30 mmHg  PCWP 22 mmHg  Jacy CO/CI 6.4/3.4  PVR 1.24 pendleton    dynes  PA sat 66%     Coronary angiogram (07/2023):  Left Main   The vessel is large. Eccentric plaque 0.3-0.5 mm.      Left Anterior Descending   The vessel is large.      First Diagonal Branch   The vessel is small and is angiographically normal.      Second Diagonal Branch   The vessel is small and is angiographically normal.      Left Circumflex   The vessel is moderate in size.      First Obtuse Marginal Branch   The vessel is moderate in size and is angiographically normal.      Second Obtuse Marginal Branch   The vessel is moderate in size and is angiographically normal.      Right Coronary Artery   The vessel is moderate in size.      Right Posterior Descending Artery   The vessel is moderate in size and is angiographically normal.      Right Posterior Atrioventricular Artery   The vessel is moderate in size and is angiographically normal.      First Right Posterolateral Branch   The vessel is small and is angiographically normal.      Second Right Posterolateral Branch   The vessel is small and is angiographically normal.         Intervention       Biopsy (07/2023)  -Negative for acute cellular rejection    -No light microscopic features of antibody mediated rejection   -Negative immunofluorescence staining for C4d or C3d capillary  deposition   -ISHLT 2005 grade 0R (previously 0); ISHLT 2013 grade pAMR0             (See Comment)      Recent Results (from the past 672 hour(s))   CK total    Collection Time: 07/22/24  7:11 AM   Result Value Ref Range     39 - 308 U/L   Cytomegalovirus DNA by PCR, Quantitative    Collection Time: 07/22/24  7:11 AM    Specimen: Arm, Right; Blood   Result Value Ref Range    CMV DNA IU/mL Not Detected Not Detected IU/mL   Comprehensive metabolic panel    Collection Time: 07/22/24  7:11 AM   Result Value Ref Range    Sodium 141 135 - 145 mmol/L    Potassium 3.6 3.4 - 5.3 mmol/L    Carbon Dioxide (CO2) 26 22 - 29 mmol/L    Anion Gap 10 7 - 15 mmol/L    Urea Nitrogen 34.8 (H) 8.0 - 23.0 mg/dL    Creatinine 2.25 (H) 0.67 - 1.17 mg/dL    GFR Estimate 31 (L) >60 mL/min/1.73m2    Calcium 8.9 8.8 - 10.4 mg/dL    Chloride 105 98 - 107 mmol/L    Glucose 99 70 - 99 mg/dL    Alkaline Phosphatase 119 40 - 150 U/L    AST 15 0 - 45 U/L    ALT 8 0 - 70 U/L    Protein Total 6.6 6.4 - 8.3 g/dL    Albumin 3.8 3.5 - 5.2 g/dL    Bilirubin Total 0.3 <=1.2 mg/dL    Patient Fasting > 8hrs? Yes    Trina Barr Virus Quantitative PCR, Plasma    Collection Time: 07/22/24  7:11 AM    Specimen: Arm, Right; Blood   Result Value Ref Range    EBV DNA IU/mL Not Detected Not Detected IU/mL   Sirolimus by Tandem Mass Spectrometry    Collection Time: 07/22/24  7:11 AM   Result Value Ref Range    Sirolimus by Tandem Mass Spectrometry 8.5 5.0 - 15.0 ug/L    Sirolimus Last Dose Date 7/21/2024     Sirolimus Last Dose Time  7:00 AM    Phosphorus    Collection Time: 07/22/24  7:11 AM   Result Value Ref Range    Phosphorus 3.1 2.5 - 4.5 mg/dL   Magnesium    Collection Time: 07/22/24  7:11 AM   Result Value Ref Range    Magnesium 2.2 1.7 - 2.3 mg/dL   Lipid Profile    Collection Time: 07/22/24  7:11 AM   Result Value Ref Range    Cholesterol 133 <200 mg/dL    Triglycerides 263 (H) <150 mg/dL    Direct Measure HDL 31 (L) >=40 mg/dL    LDL Cholesterol  Calculated 49 <=100 mg/dL    Non HDL Cholesterol 102 <130 mg/dL    Patient Fasting > 8hrs? Yes    Prostate Specific Antigen Screen    Collection Time: 07/22/24  7:11 AM   Result Value Ref Range    Prostate Specific Antigen Screen 0.32 0.00 - 6.50 ng/mL   Hemoglobin A1c    Collection Time: 07/22/24  7:11 AM   Result Value Ref Range    Hemoglobin A1C 7.3 (H) <5.7 %   CBC with platelets and differential    Collection Time: 07/22/24  7:11 AM   Result Value Ref Range    WBC Count 6.6 4.0 - 11.0 10e3/uL    RBC Count 3.71 (L) 4.40 - 5.90 10e6/uL    Hemoglobin 10.7 (L) 13.3 - 17.7 g/dL    Hematocrit 33.8 (L) 40.0 - 53.0 %    MCV 91 78 - 100 fL    MCH 28.8 26.5 - 33.0 pg    MCHC 31.7 31.5 - 36.5 g/dL    RDW 14.0 10.0 - 15.0 %    Platelet Count 219 150 - 450 10e3/uL    % Neutrophils 70 %    % Lymphocytes 20 %    % Monocytes 8 %    % Eosinophils 1 %    % Basophils 0 %    % Immature Granulocytes 1 %    NRBCs per 100 WBC 0 <1 /100    Absolute Neutrophils 4.7 1.6 - 8.3 10e3/uL    Absolute Lymphocytes 1.3 0.8 - 5.3 10e3/uL    Absolute Monocytes 0.5 0.0 - 1.3 10e3/uL    Absolute Eosinophils 0.1 0.0 - 0.7 10e3/uL    Absolute Basophils 0.0 0.0 - 0.2 10e3/uL    Absolute Immature Granulocytes 0.0 <=0.4 10e3/uL    Absolute NRBCs 0.0 10e3/uL   Ferritin    Collection Time: 07/22/24  7:11 AM   Result Value Ref Range    Ferritin 278 31 - 409 ng/mL   Iron & Iron Binding Capacity    Collection Time: 07/22/24  7:11 AM   Result Value Ref Range    Iron 52 (L) 61 - 157 ug/dL    Iron Binding Capacity 200 (L) 240 - 430 ug/dL    Iron Sat Index 26 15 - 46 %   Vitamin B12    Collection Time: 07/22/24  7:11 AM   Result Value Ref Range    Vitamin B12 278 232 - 1,245 pg/mL   HLA Donor Specific Antibody    Collection Time: 07/22/24  7:11 AM   Result Value Ref Range    Donor Identification 07/19/2020     Organ Heart     DSA Present NO     DSA Comments        Flow Single Antigen Beads assays are intended for detection/identification of IgG anti-HLA  antibodies. Mfi values may not accurately quantify donor-specific antibody levels in all instances.    DSA Test Method SA EDTA FCS    HLA Jose D Class I, Single Antigen    Collection Time: 07/22/24  7:11 AM   Result Value Ref Range    SA 1 TEST METHOD SA EDTA FCS     SA 1 CELL Class I     SA1 HI RISK JOSE D None     SA1 MOD RISK JOSE D None     SA 1  COMMENTS        HLA PRA Test performed by modified testing procedure that may also include pretreatment of serum. Pretreatment may be the addition of fetal calf serum, EDTA, and/or adsorption.  High-risk, MFI > 3,000.  Mod-risk, -3,000.   HLA Jose D Class II, Single Antigen    Collection Time: 07/22/24  7:11 AM   Result Value Ref Range    SA 2 TEST METHOD SA EDTA FCS     SA 2 CELL Class II     SA2 HI RISK JOSE D None     SA2 MOD RISK JOSE D None     SA 2 COMMENTS        HLA PRA Test performed by modified testing procedure that may also include pretreatment of serum. Pretreatment may be the addition of fetal calf serum, EDTA, and/or adsorption.  High-risk, MFI > 3,000.  Mod-risk, -3,000.   HLA Jose D, CPRA    Collection Time: 07/22/24  7:11 AM   Result Value Ref Range    UNOS CPRA 0     UNACCEPTABLE ANTIGENS None >3000 mfi    Echocardiogram Complete    Collection Time: 07/22/24  8:11 AM   Result Value Ref Range    LVEF  55-60%    TXP ABSTRACTED ECH    Collection Time: 07/22/24  8:47 AM   Result Value Ref Range    Transplant Date (ECH)      Echo Type (ECH)      LV Ejection Fraction Percent (ECH) 55-60%     RV Systolic Est (mmHg) (ECH)      LV Global Function (ECH)      LV Regional Wall Motion (ECH)      Mitral Valve Morphology (ECH)      Mitral Valve Doppler (ECH)      Aortic Valve - Aorta (ECH)      Aortic Valve Morphology (ECH)      Aortic Valve Doppler (ECH)      RV Size (ECH)      RV Global Function (ECH)      RV Systolic Pressure (ECH)      Left Atrium (ECH)      Right Atrium (ECH)      Tricuspid Valve Morphology (ECH)      Tricuspid Valve Doppler (ECH)      Pulmonary Valve  Morphology (ECH)      Pulmonary Valve Doppler (ECH)      M-Mode Measure LVIDd (ECH)      M-Mode Measure LVIDs (ECH)      M-Mode Measure PWD (ECH)      M-Mode Measure IVSD (ECH)      M-Mode Measure AO (ECH)       Lab Results   Component Value Date    A1C 7.3 07/22/2024    A1C 7.6 04/16/2024    A1C 6.9 01/16/2024    A1C 8.1 11/07/2023    A1C 8.7 07/17/2023    A1C 8.3 02/02/2023    A1C 8.4 09/12/2022    A1C 7.4 03/14/2022    A1C 6.7 01/14/2021    A1C 5.9 12/07/2020    A1C 8.2 07/03/2020    A1C 7.9 07/08/2019    A1C 8.5 03/11/2019          Assessment/Plan:  Mr. 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    #1 status post orthoptic heart transplantation     - He is doing well  -  He has normal graft function by echocardiogram   -  Will cancel coronary angiogram as his creatinine is > 2 mg/dl. He has no known CAV. Last angiogram from 2023 normal  -  will schedule him for DSE  -  He has no prior DSA.  -  Sirolimus is therapeutic for a target level of 6-8.  We will continue him on mycophenolate 1500 twice a day.  -  We will continue him on aspirin and rosuvastatin.  -  Furosemide 20 mg daily for his elevated left-sided filling pressure from his restrictive physiology.    #2 toxoplasma + serostatus - We will continue him on Bactrim 3 times a week given his low renal function.    #3 type 2 diabetes mellitus - Hemoglobin A1c is better controlled. He is followed by the endocrinologist at the . He is on Jardiance, Trulicity and insulin.      #4 chronic kidney disease - Creatinine slightly worse. We will continue him on sirolimus and CellCept. Will have him see nephrology.     #5 hypertension - BP elevated in the clinic but in the 130's at home. Will continue lisinopril 10 mg daily twice daily       #6 anemia -likely due to combination of chronic kidney disease and iron deficiency. His colonoscopy last year was incomplete. Will refer him for EGD and  colonoscopy. He in on oral iron supplementation. He is also being managed by the anemia of the CKD clinic.     #7 Prevention - colonoscopy as above. Encouraged him to see dermatology.     He will return to see us in a year with a DSE. He will have routine labs as per protocol in 6 months. He will call us in the interim if any further worsening symptoms.      It was a pleasure meeting Mr. Oliveira in our heart transplant clinic at [facility].  We thank you for involving us in his care.    Total time today was 50 minutes reviewing notes, imaging, labs, patient visit, orders and documentation    Sincerely,  Arvin Sheridan MD   [facility]  Heart Failure, Transplant, and Mechanical Circulatory Support Cardiology   Cardiovascular Division  [facility]   [phone]        Please do not hesitate to contact me if you have any questions/concerns.     Sincerely,     Arvin Sheridan MD

## 2024-07-23 ENCOUNTER — PATIENT OUTREACH (OUTPATIENT)
Dept: CARE COORDINATION | Facility: CLINIC | Age: 69
End: 2024-07-23
Payer: COMMERCIAL

## 2024-07-23 DIAGNOSIS — Z79.899 ENCOUNTER FOR LONG-TERM (CURRENT) USE OF HIGH-RISK MEDICATION: Primary | ICD-10-CM

## 2024-07-23 DIAGNOSIS — Z94.1 HEART REPLACED BY TRANSPLANT (H): ICD-10-CM

## 2024-07-23 LAB
DONOR IDENTIFICATION: NORMAL
DSA COMMENTS: NORMAL
DSA PRESENT: NO
DSA TEST METHOD: NORMAL
ORGAN: NORMAL
SA 1  COMMENTS: NORMAL
SA 1 CELL: NORMAL
SA 1 TEST METHOD: NORMAL
SA 2 CELL: NORMAL
SA 2 COMMENTS: NORMAL
SA 2 TEST METHOD: NORMAL
SA1 HI RISK ABY: NORMAL
SA1 MOD RISK ABY: NORMAL
SA2 HI RISK ABY: NORMAL
SA2 MOD RISK ABY: NORMAL
UNACCEPTABLE ANTIGENS: NORMAL
UNOS CPRA: 0

## 2024-07-23 NOTE — PROGRESS NOTES
Anemia Management Note - Follow Up      SUBJECTIVE/OBJECTIVE:    Referred by Arlin Guajardo NP  on 2023  Primary Diagnosis: Anemia in Chronic Kidney Disease (N18.4, D63.1)     Secondary Diagnosis:  Other-Heart Transplant (Z94.1)  Transplant [id number]  Hgb goal range:  9-10  Epo/Darbo: Aranesp  40 mcg  every two weeks for Hgb <10, In clinic  Iron regimen:  Ferrous Sulfate BID  Ferrous Sulfate  once daily, increased to BID on 502     Recent DANDY use, transfusion, IV iron: none     Labs : 1109  RX/TX plans : 1112     No history of stroke, MI, or cancers. Hx venous thrombus. Previously anticoagulated.  Contact:            Ok to leave message regarding Scheduling, billing and medical information per consent to communicate dated 310                              OK to speak with children Elisabeth Rivers and Lucian Oliveira Jr. regarding Scheduling, billing and medical information per consent to communicate dated 310           Latest Ref Rng & Units 2023 2023 1/10/2024 2024 2024 2024 2024   Anemia   DANDY Dose  40mcg 40mcg 40mcg       Hemoglobin 13.3 - 17.7 g/dL 8.0  8.1  9.1  11.6  10.2  10.2  10.7    TSAT 15 - 46 % 23   30  30   26  26    Ferritin 31 - 409 ng/mL 304   277  398   284  278         BP Readings from Last 3 Encounters:   24 (!) 155/78   24 122/75   24 (!) 144/72     Wt Readings from Last 2 Encounters:   24 82.6 kg (182 lb)   24 79.8 kg (176 lb)         ASSESSMENT:    Hgb:Above goal - recommend hold dose  TSat: not at goal of >30% Ferritin: At goal (>100ng/mL)  Overall, iron studies are stable with oral iron BID   Goals Addressed    None         PLAN:  RTC for anemia labs in 12 week(s).    Orders needed to be renewed (for next follow-up date) in EPIC: None    Plan discussed with:  Lucian with , [facility]       NEXT FOLLOW-UP DATE:  1022    Carrie Leopold, RN BSN  Anemia Services  [facility]  [email or web address]  Office: [phone]  Fax [phone]

## 2024-07-24 DIAGNOSIS — Z94.1 HEART REPLACED BY TRANSPLANT (H): ICD-10-CM

## 2024-07-24 DIAGNOSIS — D50.9 IRON DEFICIENCY ANEMIA, UNSPECIFIED IRON DEFICIENCY ANEMIA TYPE: Primary | ICD-10-CM

## 2024-07-24 RX ORDER — EMPAGLIFLOZIN 10 MG/1
TABLET, FILM COATED ORAL
Qty: 100 TABLET | Refills: 2 | Status: SHIPPED | OUTPATIENT
Start: 2024-07-24 | End: 2024-08-09

## 2024-07-24 NOTE — PROGRESS NOTES
07/22/2024    ADULT HEART TRANSPLANT CLINIC    Mr. 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  He is doing well.  He has no specific complaints.  He walks regularly. He does have lower extremity swelling on and off.  It is more during the daytime.  He is compliant with his medications.  He states that his blood sugars are better controlled.   He is being currently followed by our endocrinologist at the .    Due to his worsening renal disease, we switched him from tacrolimus and rapamycin regimen to mycophenolate and rapamycin.  He is currently on mycophenolate 1500 mg twice a day and the rapamycin with a goal of 6-8.      PAST MEDICAL HISTORY:  Past Medical History:   Diagnosis Date    CAD (coronary artery disease)     CHF (congestive heart failure) (H)     CKD (chronic kidney disease), stage III (H)     Cortical cataract of both eyes     Diabetes (H)     Hyperlipidemia     Hypertension     Infection due to Streptococcus mitis group 09/23/2020    Ischemic cardiomyopathy     Obesity     CHANTEL (obstructive sleep apnea)     occas cpap    CHANTEL (obstructive sleep apnea)- severe (AHI 30)     Osteoarthritis          CURRENT MEDICATIONS:  Current Outpatient Medications   Medication Sig Dispense Refill    acetaminophen (TYLENOL) 325 MG tablet Take 3 tablets (975 mg) by mouth every 8 hours as needed for mild pain 60 tablet 3    Alcohol Swabs PADS Use to swab the area of the injection or sergio as directed   Per insurance coverage 100 each 0    aspirin (ASA) 81 MG chewable  tablet Take 1 tablet (81 mg) by mouth daily (Patient taking differently: Take 81 mg by mouth every morning) 120 tablet 0    blood glucose (NO BRAND SPECIFIED) test strip Use to test blood sugar 4 times daily or as directed. 400 strip 3    blood glucose monitoring (ACCU-CHEK FELIPE SMARTVIEW) meter device kit Use to test blood sugar 3-4 times daily, as directed. 1 kit 0    blood glucose monitoring (SOFTCLIX) lancets 1 each 4 times daily Use to test blood sugars 2 times daily. 400 each 8    calcium carbonate-vitamin D (CALTRATE) 600-10 MG-MCG per tablet TAKE ONE TABLET BY MOUTH TWICE A DAY WITH MEALS 180 tablet 3    Continuous Glucose Sensor (DEXCOM G7 SENSOR) MISC Change every 10 days.  Please provide with a 3 month supply. 9 each 1    Dulaglutide (TRULICITY) 4.5 MG/0.5ML SOPN Inject 4.5 mg Subcutaneous once a week 7 mL 3    empagliflozin (JARDIANCE) 10 MG TABS tablet Take 1 tablet (10 mg) by mouth daily 90 tablet 1    ferrous sulfate (FEROSUL) 325 (65 Fe) MG tablet Take 1 tablet (325 mg) by mouth 2 times daily (with meals) 180 tablet 3    furosemide (LASIX) 20 MG tablet Take 20 mg by mouth daily      HUMALOG KWIKPEN 100 UNIT/ML soln Give 10 units with breakfast, 10 units with lunch,  and 5 units with dinner.  Average daily use is 25 units 90 mL 3    insulin degludec (TRESIBA) 200 UNIT/ML pen Inject 60 Units Subcutaneous every morning Order pen needles too 90 mL 11    insulin NPH (HUMULIN N KWIKPEN) 100 UNIT/ML injection GIve 50 units each morning and 13 at night subcutaneous 45 mL 1    insulin pen needle (32G X 4 MM) 32G X 4 MM miscellaneous Use 5-6 pen needles daily or as directed. 600 each 2    ketorolac (ACULAR) 0.5 % ophthalmic solution Place 1 drop into the right eye 2 times daily 10 mL 3    magnesium oxide 400 MG tablet Take 1 tablet (400 mg) by mouth 2 times daily 180 tablet 3    mycophenolate (GENERIC EQUIVALENT) 500 MG tablet Take 3 tablets (1,500 mg) by mouth 2 times daily 180 tablet 11    omega-3 acid ethyl  esters (LOVAZA) 1 g capsule Take 1 capsule (1 g) by mouth daily 100 capsule 0    rosuvastatin (CRESTOR) 20 MG tablet Take 1 tablet (20 mg) by mouth daily 90 tablet 3    sirolimus (GENERIC EQUIVALENT) 0.5 MG tablet Take 6 tablets (3 mg) by mouth daily 180 tablet 11    sulfamethoxazole-trimethoprim (BACTRIM) 400-80 MG tablet Take 1 tablet by mouth three times a week 36 tablet 3    tamsulosin (FLOMAX) 0.4 MG capsule TAKE ONE CAPSULE BY MOUTH ONCE DAILY 90 capsule 3    senna-docusate (SENOKOT-S/PERICOLACE) 8.6-50 MG tablet Take 1 tablet by mouth 2 times daily as needed (hold for loose stools) (Patient not taking: Reported on 7/22/2024) 60 tablet 3     Current Facility-Administered Medications   Medication Dose Route Frequency Provider Last Rate Last Admin    bevacizumab (AVASTIN) intravitreal inj 1.25 mg  1.25 mg Intravitreal Q28 Days Triny Bunch MD           EXAM:  BP (!) 155/78 (BP Location: Right arm, Patient Position: Chair, Cuff Size: Adult Regular)   Pulse 77   Wt 82.6 kg (182 lb)   SpO2 99%   BMI 30.29 kg/m    He was awake, alert, oriented x3.  He was comfortable.  He was in no apparent distress.  He had no pallor, cyanosis or jaundice.  His neck exam revealed no jugular venous distention.  His carotids were 2+ bilaterally.  His pulse was regular and rhythm.  Cardiac auscultation revealed normal S1 and S2 with no murmur rub or gallop.  Auscultation of the lungs revealed equal air entry on both sides.  His abdomen was soft with normal bowels with no tenderness no rigidity no guarding.  He had no focal neurological deficit.  His extremities showed no edema.    Testing:    Echo (07/2024):  Global and regional left ventricular function is normal with an EF of 55-60%.  Right ventricular function, chamber size, wall motion, and thickness are  normal.  Both atria appear normal.  No significant valvular abnormalities present.  The aortic root and visualized thoracic aorta are normal.  The inferior vena  cava is normal.  No pericardial effusion is present.     Compared to prior TTE 7/17/23, no significant changes noted.    RHC (07/2023)  BSA 1.89 m2  BP  120/69/91 mmHg  RA -/-/14 mmHg  RV 38/14/- mmHg  PA 39/23/30 mmHg  PCWP 22 mmHg  Jacy CO/CI 6.4/3.4  PVR 1.24 pendleton    dynes  PA sat 66%     Coronary angiogram (07/2023):  Left Main   The vessel is large. Eccentric plaque 0.3-0.5 mm.      Left Anterior Descending   The vessel is large.      First Diagonal Branch   The vessel is small and is angiographically normal.      Second Diagonal Branch   The vessel is small and is angiographically normal.      Left Circumflex   The vessel is moderate in size.      First Obtuse Marginal Branch   The vessel is moderate in size and is angiographically normal.      Second Obtuse Marginal Branch   The vessel is moderate in size and is angiographically normal.      Right Coronary Artery   The vessel is moderate in size.      Right Posterior Descending Artery   The vessel is moderate in size and is angiographically normal.      Right Posterior Atrioventricular Artery   The vessel is moderate in size and is angiographically normal.      First Right Posterolateral Branch   The vessel is small and is angiographically normal.      Second Right Posterolateral Branch   The vessel is small and is angiographically normal.         Intervention       Biopsy (07/2023)  -Negative for acute cellular rejection    -No light microscopic features of antibody mediated rejection   -Negative immunofluorescence staining for C4d or C3d capillary deposition   -ISHLT 2005 grade 0R (previously 0); ISHLT 2013 grade pAMR0             (See Comment)      Recent Results (from the past 672 hour(s))   CK total    Collection Time: 07/22/24  7:11 AM   Result Value Ref Range     39 - 308 U/L   Cytomegalovirus DNA by PCR, Quantitative    Collection Time: 07/22/24  7:11 AM    Specimen: Arm, Right; Blood   Result Value Ref Range    CMV DNA IU/mL Not Detected  Not Detected IU/mL   Comprehensive metabolic panel    Collection Time: 07/22/24  7:11 AM   Result Value Ref Range    Sodium 141 135 - 145 mmol/L    Potassium 3.6 3.4 - 5.3 mmol/L    Carbon Dioxide (CO2) 26 22 - 29 mmol/L    Anion Gap 10 7 - 15 mmol/L    Urea Nitrogen 34.8 (H) 8.0 - 23.0 mg/dL    Creatinine 2.25 (H) 0.67 - 1.17 mg/dL    GFR Estimate 31 (L) >60 mL/min/1.73m2    Calcium 8.9 8.8 - 10.4 mg/dL    Chloride 105 98 - 107 mmol/L    Glucose 99 70 - 99 mg/dL    Alkaline Phosphatase 119 40 - 150 U/L    AST 15 0 - 45 U/L    ALT 8 0 - 70 U/L    Protein Total 6.6 6.4 - 8.3 g/dL    Albumin 3.8 3.5 - 5.2 g/dL    Bilirubin Total 0.3 <=1.2 mg/dL    Patient Fasting > 8hrs? Yes    Trina Barr Virus Quantitative PCR, Plasma    Collection Time: 07/22/24  7:11 AM    Specimen: Arm, Right; Blood   Result Value Ref Range    EBV DNA IU/mL Not Detected Not Detected IU/mL   Sirolimus by Tandem Mass Spectrometry    Collection Time: 07/22/24  7:11 AM   Result Value Ref Range    Sirolimus by Tandem Mass Spectrometry 8.5 5.0 - 15.0 ug/L    Sirolimus Last Dose Date 7/21/2024     Sirolimus Last Dose Time  7:00 AM    Phosphorus    Collection Time: 07/22/24  7:11 AM   Result Value Ref Range    Phosphorus 3.1 2.5 - 4.5 mg/dL   Magnesium    Collection Time: 07/22/24  7:11 AM   Result Value Ref Range    Magnesium 2.2 1.7 - 2.3 mg/dL   Lipid Profile    Collection Time: 07/22/24  7:11 AM   Result Value Ref Range    Cholesterol 133 <200 mg/dL    Triglycerides 263 (H) <150 mg/dL    Direct Measure HDL 31 (L) >=40 mg/dL    LDL Cholesterol Calculated 49 <=100 mg/dL    Non HDL Cholesterol 102 <130 mg/dL    Patient Fasting > 8hrs? Yes    Prostate Specific Antigen Screen    Collection Time: 07/22/24  7:11 AM   Result Value Ref Range    Prostate Specific Antigen Screen 0.32 0.00 - 6.50 ng/mL   Hemoglobin A1c    Collection Time: 07/22/24  7:11 AM   Result Value Ref Range    Hemoglobin A1C 7.3 (H) <5.7 %   CBC with platelets and differential     Collection Time: 07/22/24  7:11 AM   Result Value Ref Range    WBC Count 6.6 4.0 - 11.0 10e3/uL    RBC Count 3.71 (L) 4.40 - 5.90 10e6/uL    Hemoglobin 10.7 (L) 13.3 - 17.7 g/dL    Hematocrit 33.8 (L) 40.0 - 53.0 %    MCV 91 78 - 100 fL    MCH 28.8 26.5 - 33.0 pg    MCHC 31.7 31.5 - 36.5 g/dL    RDW 14.0 10.0 - 15.0 %    Platelet Count 219 150 - 450 10e3/uL    % Neutrophils 70 %    % Lymphocytes 20 %    % Monocytes 8 %    % Eosinophils 1 %    % Basophils 0 %    % Immature Granulocytes 1 %    NRBCs per 100 WBC 0 <1 /100    Absolute Neutrophils 4.7 1.6 - 8.3 10e3/uL    Absolute Lymphocytes 1.3 0.8 - 5.3 10e3/uL    Absolute Monocytes 0.5 0.0 - 1.3 10e3/uL    Absolute Eosinophils 0.1 0.0 - 0.7 10e3/uL    Absolute Basophils 0.0 0.0 - 0.2 10e3/uL    Absolute Immature Granulocytes 0.0 <=0.4 10e3/uL    Absolute NRBCs 0.0 10e3/uL   Ferritin    Collection Time: 07/22/24  7:11 AM   Result Value Ref Range    Ferritin 278 31 - 409 ng/mL   Iron & Iron Binding Capacity    Collection Time: 07/22/24  7:11 AM   Result Value Ref Range    Iron 52 (L) 61 - 157 ug/dL    Iron Binding Capacity 200 (L) 240 - 430 ug/dL    Iron Sat Index 26 15 - 46 %   Vitamin B12    Collection Time: 07/22/24  7:11 AM   Result Value Ref Range    Vitamin B12 278 232 - 1,245 pg/mL   HLA Donor Specific Antibody    Collection Time: 07/22/24  7:11 AM   Result Value Ref Range    Donor Identification 07/19/2020     Organ Heart     DSA Present NO     DSA Comments        Flow Single Antigen Beads assays are intended for detection/identification of IgG anti-HLA antibodies. Mfi values may not accurately quantify donor-specific antibody levels in all instances.    DSA Test Method SA EDTA FCS    HLA Jose D Class I, Single Antigen    Collection Time: 07/22/24  7:11 AM   Result Value Ref Range    SA 1 TEST METHOD SA EDTA FCS     SA 1 CELL Class I     SA1 HI RISK JOSE D None     SA1 MOD RISK JOSE D None     SA 1  COMMENTS        HLA PRA Test performed by modified testing procedure  that may also include pretreatment of serum. Pretreatment may be the addition of fetal calf serum, EDTA, and/or adsorption.  High-risk, MFI > 3,000.  Mod-risk, -3,000.   HLA Jose D Class II, Single Antigen    Collection Time: 07/22/24  7:11 AM   Result Value Ref Range    SA 2 TEST METHOD SA EDTA FCS     SA 2 CELL Class II     SA2 HI RISK JOSE D None     SA2 MOD RISK JOSE D None     SA 2 COMMENTS        HLA PRA Test performed by modified testing procedure that may also include pretreatment of serum. Pretreatment may be the addition of fetal calf serum, EDTA, and/or adsorption.  High-risk, MFI > 3,000.  Mod-risk, -3,000.   HLA Jose D, CPRA    Collection Time: 07/22/24  7:11 AM   Result Value Ref Range    UNOS CPRA 0     UNACCEPTABLE ANTIGENS None >3000 mfi    Echocardiogram Complete    Collection Time: 07/22/24  8:11 AM   Result Value Ref Range    LVEF  55-60%    TXP ABSTRACTED ECH    Collection Time: 07/22/24  8:47 AM   Result Value Ref Range    Transplant Date (ECH)      Echo Type (ECH)      LV Ejection Fraction Percent (ECH) 55-60%     RV Systolic Est (mmHg) ([facility])      LV Global Function (ECH)      LV Regional Wall Motion (ECH)      Mitral Valve Morphology (ECH)      Mitral Valve Doppler ([facility])      Aortic Valve - Aorta (ECH)      Aortic Valve Morphology (ECH)      Aortic Valve Doppler ([facility])      RV Size (ECH)      RV Global Function (ECH)      RV Systolic Pressure (ECH)      Left Atrium (ECH)      Right Atrium (ECH)      Tricuspid Valve Morphology (ECH)      Tricuspid Valve Doppler ([facility])      Pulmonary Valve Morphology (ECH)      Pulmonary Valve Doppler ([facility])      M-Mode Measure LVIDd ([facility])      M-Mode Measure LVIDs ([facility])      M-Mode Measure PWD ([facility])      M-Mode Measure IVSD ([facility])      M-Mode Measure AO ([facility])       Lab Results   Component Value Date    A1C 7.3 07/22/2024    A1C 7.6 04/16/2024    A1C 6.9 01/16/2024    A1C 8.1 11/07/2023    A1C 8.7 07/17/2023    A1C 8.3 02/02/2023    A1C 8.4 09/12/2022    A1C 7.4  03/14/2022    A1C 6.7 01/14/2021    A1C 5.9 12/07/2020    A1C 8.2 07/03/2020    A1C 7.9 07/08/2019    A1C 8.5 03/11/2019          Assessment/Plan:  Mr. 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    #1 status post orthoptic heart transplantation     - He is doing well  -  He has normal graft function by echocardiogram   -  Will cancel coronary angiogram as his creatinine is > 2 mg/dl. He has no known CAV. Last angiogram from 2023 normal  -  will schedule him for DSE  -  He has no prior DSA.  -  Sirolimus is therapeutic for a target level of 6-8.  We will continue him on mycophenolate 1500 twice a day.  -  We will continue him on aspirin and rosuvastatin.  -  Furosemide 20 mg daily for his elevated left-sided filling pressure from his restrictive physiology.    #2 toxoplasma + serostatus - We will continue him on Bactrim 3 times a week given his low renal function.    #3 type 2 diabetes mellitus - Hemoglobin A1c is better controlled. He is followed by the endocrinologist at the . He is on Jardiance, Trulicity and insulin.      #4 chronic kidney disease - Creatinine slightly worse. We will continue him on sirolimus and CellCept. Will have him see nephrology.     #5 hypertension - BP elevated in the clinic but in the 130's at home. Will continue lisinopril 10 mg daily twice daily       #6 anemia -likely due to combination of chronic kidney disease and iron deficiency. His colonoscopy last year was incomplete. Will refer him for EGD and colonoscopy. He in on oral iron supplementation. He is also being managed by the anemia of the CKD clinic.     #7 Prevention - colonoscopy as above. Encouraged him to see dermatology.     He will return to see us in a year with a DSE. He will have routine labs as per protocol in 6 months. He will call us in the interim if any further worsening symptoms.      It was a pleasure meeting Mr. Oliveira in our heart  transplant clinic at [facility].  We thank you for involving us in his care.    Total time today was 50 minutes reviewing notes, imaging, labs, patient visit, orders and documentation    Sincerely,  Arvin Sheridan MD   [facility]  Heart Failure, Transplant, and Mechanical Circulatory Support Cardiology   Cardiovascular Division  [facility]   [phone]

## 2024-07-25 ENCOUNTER — TELEPHONE (OUTPATIENT)
Dept: TRANSPLANT | Facility: CLINIC | Age: 69
End: 2024-07-25

## 2024-07-25 LAB
ALLOMAP SCORE (EXTERNAL): 32 (ref 0–40)
IMMUKNOW IMMUNE CELL FUNCTION: 619 NG/ML
NEGATIVE PREDICTIVE VALUE PERCENT (EXTERNAL): 99 %
POSITIVE PREDICTIVE VALUE PERCENT (EXTERNAL): 2.9 %

## 2024-07-25 RX ORDER — LISINOPRIL 5 MG/1
5 TABLET ORAL DAILY
Status: CANCELLED | OUTPATIENT
Start: 2024-07-25

## 2024-07-25 NOTE — TELEPHONE ENCOUNTER
Returned call to patient with Congolese interpretor regarding questions about upcoming appointments. He recently hurt his knee and says he is unable to walk on a treadmill for a stress test. Assured patient that for DSE this will not be required and he is agreeable to come to appointments next week on July 30. Reviewed pre-procedure instructions with patient.    Salty reports home SBPs 137-138 over the past 3-days. Confirmed that patient is not taking any blood pressure medicine, specifically he has not been taking lisinopril. He does take lasix 20 mg daily. Dr. Sheridan would like patient to resume lisinopril for kidney protection. Message sent to Dr. Hogan regarding restart dose.    Reviewed recent sirolimus level 8.5 (goal 6-8) with patient, confirmed 24 hour trough and current dose 3 mg daily.   -Immuknow 619, previous immuknow levels with moderate immune cell response  -confirmed that patient is taking MMF 1500 mg bid  -included in message to Dr. Sheridan inquiring about any changes to IMS at this time    Orders placed for further anemia work-up with procedures: upper and lower endoscopy. Advised patient to wait for call with assistance to schedule.    No med changes today. Will follow-up with patient regarding Dr. Sheridan's response to above questions. Patient verbalized understanding and next steps.

## 2024-07-26 ENCOUNTER — TELEPHONE (OUTPATIENT)
Dept: GASTROENTEROLOGY | Facility: CLINIC | Age: 69
End: 2024-07-26
Payer: COMMERCIAL

## 2024-07-26 DIAGNOSIS — D50.9 IRON DEFICIENCY ANEMIA, UNSPECIFIED IRON DEFICIENCY ANEMIA TYPE: Primary | ICD-10-CM

## 2024-07-26 DIAGNOSIS — Z94.1 HEART REPLACED BY TRANSPLANT (H): ICD-10-CM

## 2024-07-26 NOTE — TELEPHONE ENCOUNTER
Pre Assessment RN Review    Focused Assessments    MAC required in hospital setting only. PAC evaluation required if scheduled at [facility].      Scheduling Status & Recommendations    Sedation: MAC/Deep Sedation - Per exclusion criteria.  Location Type: Hospital - Per exclusion criteria.

## 2024-07-26 NOTE — TELEPHONE ENCOUNTER
"Endoscopy Scheduling Screen    Have you had a positive Covid test in the last 14 days?  No    What is your communication preference for Instructions and/or Bowel Prep?   Mail/USPS    What insurance is in the chart?  Other:  [facility]    Ordering/Referring Provider: SHONDA MERCHANT    (If ordering provider performs procedure, schedule with ordering provider unless otherwise instructed. )    BMI: Estimated body mass index is 30.29 kg/m  as calculated from the following:    Height as of 4/16/24: 1.651 m (5' 5\").    Weight as of 7/22/24: 82.6 kg (182 lb).     Sedation Ordered  moderate sedation.   If patient BMI > 50 do not schedule in [facility].    If patient BMI > 45 do not schedule at [facility].    Are you taking methadone or Suboxone?  No    Have you had difficulties, pain, or discomfort during past endoscopy procedures?  No    Are you taking any prescription medications for pain 3 or more times per week?   NO, No RN review required.    Do you have a history of malignant hyperthermia?  No       Have you been diagnosed or told you have pulmonary hypertension?   No    Do you have an LVAD?  No    Have you been told you have moderate to severe sleep apnea?  No    Have you been told you have COPD, asthma, or any other lung disease?  No    Do you have any heart conditions?  No     Have you ever had or are you waiting for an organ transplant?  Yes. Have you had or are on on the wait list for a heart/lung transplant? Yes, Hospital Only    Have you had a stroke or transient ischemic attack (TIA aka \"mini stroke\" in the last 6 months?   No    Have you been diagnosed with or been told you have cirrhosis of the liver?   No    Are you currently on dialysis?   No    Do you need assistance transferring?   No    BMI: Estimated body mass index is 30.29 kg/m  as calculated from the following:    Height as of 4/16/24: 1.651 m (5' 5\").    Weight as of 7/22/24: 82.6 kg (182 lb).     Is patients BMI > 40 and scheduling location [facility]?  No    Do you " take an injectable medication for weight loss or diabetes (excluding insulin)?  No    Do you take the medication Naltrexone?  No    Do you take blood thinners?  No       Prep   Are you currently on dialysis or do you have chronic kidney disease?  No    Do you have a diagnosis of diabetes?  Yes (Golytely Prep)    Do you have a diagnosis of cystic fibrosis (CF)?  No    On a regular basis do you go 3 -5 days between bowel movements?  No    BMI > 40?  No    Preferred Pharmacy:    [facility] - [place], [place] - [address]   [address] [facility] [place] [address]  Phone: [phone] Fax: [phone]        Final Scheduling Details     Procedure scheduled  Colonoscopy / Upper endoscopy (EGD)    Surgeon:  SMITA     Date of procedure:  10/8/24     Pre-OP / PAC:   Yes - PAC clinic evaluation scheduled.    Location  [facility] - Per exclusion criteria.    Sedation   MAC/Deep Sedation - Per exclusion criteria.      Patient Reminders:   You will receive a call from a Nurse to review instructions and health history.  This assessment must be completed prior to your procedure.  Failure to complete the Nurse assessment may result in the procedure being cancelled.      On the day of your procedure, please designate an adult(s) who can drive you home stay with you for the next 24 hours. The medicines used in the exam will make you sleepy. You will not be able to drive.      You cannot take public transportation, ride share services, or non-medical taxi service without a responsible caregiver.  Medical transport services are allowed with the requirement that a responsible caregiver will receive you at your destination.  We require that drivers and caregivers are confirmed prior to your procedure.

## 2024-07-29 ENCOUNTER — TELEPHONE (OUTPATIENT)
Dept: TRANSPLANT | Facility: CLINIC | Age: 69
End: 2024-07-29
Payer: COMMERCIAL

## 2024-07-29 DIAGNOSIS — Z94.1 HEART REPLACED BY TRANSPLANT (H): Primary | ICD-10-CM

## 2024-07-29 RX ORDER — LISINOPRIL 5 MG/1
5 TABLET ORAL DAILY
Qty: 90 TABLET | Refills: 3 | Status: SHIPPED | OUTPATIENT
Start: 2024-07-29

## 2024-07-29 NOTE — TELEPHONE ENCOUNTER
FUTURE VISIT INFORMATION      SURGERY INFORMATION:  Date: 10/8/24  [facility]  Surgeon:  Rip Bryan MD   Anesthesia Type:  mac  Procedure: Colonoscopy Esophagoscopy, gastroscopy, duodenoscopy (EGD), combined     RECORDS REQUESTED FROM:       Primary Care Provider: [facility]    Pertinent Medical History: CAD, CHANTEL    Most recent EKG+ Tracin24    Most recent ECHO: 24    Most recent Cardiac Stress Test: 24    Most recent Coronary Angiogram: 23

## 2024-07-29 NOTE — TELEPHONE ENCOUNTER
After reviewing BPs with Dr. Sheridan- pt to start taking 5mg of Lisinopril daily.  Will plan to check with pt in a week for BP update.  Pt called with above instructions using  and states understanding.

## 2024-07-30 ENCOUNTER — HOSPITAL ENCOUNTER (OUTPATIENT)
Dept: CARDIOLOGY | Facility: CLINIC | Age: 69
Discharge: HOME | End: 2024-07-30
Attending: INTERNAL MEDICINE | Admitting: INTERNAL MEDICINE
Payer: COMMERCIAL

## 2024-07-30 DIAGNOSIS — Z79.899 ENCOUNTER FOR LONG-TERM (CURRENT) USE OF HIGH-RISK MEDICATION: ICD-10-CM

## 2024-07-30 LAB
CV STRESS CURRENT BP HE: NORMAL
CV STRESS CURRENT HR HE: 101
CV STRESS CURRENT HR HE: 107
CV STRESS CURRENT HR HE: 112
CV STRESS CURRENT HR HE: 112
CV STRESS CURRENT HR HE: 129
CV STRESS CURRENT HR HE: 131
CV STRESS CURRENT HR HE: 133
CV STRESS CURRENT HR HE: 135
CV STRESS CURRENT HR HE: 136
CV STRESS CURRENT HR HE: 79
CV STRESS CURRENT HR HE: 80
CV STRESS CURRENT HR HE: 82
CV STRESS CURRENT HR HE: 87
CV STRESS CURRENT HR HE: 89
CV STRESS CURRENT HR HE: 93
CV STRESS DEVIATION TIME HE: NORMAL
CV STRESS ECHO PERCENT HR HE: NORMAL
CV STRESS EXERCISE STAGE HE: NORMAL
CV STRESS EXERCISE STAGE REACHED HE: NORMAL
CV STRESS FINAL RESTING BP HE: NORMAL
CV STRESS FINAL RESTING HR HE: 87
CV STRESS MAX HR HE: 137
CV STRESS MAX TREADMILL GRADE HE: 0
CV STRESS MAX TREADMILL SPEED HE: 0
CV STRESS PEAK DIA BP HE: NORMAL
CV STRESS PEAK SYS BP HE: NORMAL
CV STRESS PHASE HE: NORMAL
CV STRESS PROTOCOL HE: NORMAL
CV STRESS REASON STOPPED HE: NORMAL
CV STRESS ST DEVIATION AMOUNT HE: NORMAL
CV STRESS ST DEVIATION ELEVATION HE: NORMAL
CV STRESS ST EVELATION AMOUNT HE: NORMAL
CV STRESS SYMPTOMS HE: NORMAL
CV STRESS TEST TYPE HE: NORMAL
CV STRESS TOTAL STAGE TIME MIN 1 HE: NORMAL
STRESS ECHO BASELINE DIASTOLIC HE: 82
STRESS ECHO BASELINE HR: NORMAL
STRESS ECHO BASELINE SYSTOLIC BP: 162
STRESS ECHO LAST STRESS DIASTOLIC BP: 69
STRESS ECHO LAST STRESS HR: 131
STRESS ECHO LAST STRESS SYSTOLIC BP: 172
STRESS ECHO POST ESTIMATED WORKLOAD: 1
STRESS ECHO POST EXERCISE DUR MIN: 4
STRESS ECHO POST EXERCISE DUR SEC: 28
STRESS ECHO TARGET HR: 128

## 2024-07-30 PROCEDURE — 93350 STRESS TTE ONLY: CPT | Mod: 26 | Performed by: STUDENT IN AN ORGANIZED HEALTH CARE EDUCATION/TRAINING PROGRAM

## 2024-07-30 PROCEDURE — 93321 DOPPLER ECHO F-UP/LMTD STD: CPT | Mod: 26 | Performed by: STUDENT IN AN ORGANIZED HEALTH CARE EDUCATION/TRAINING PROGRAM

## 2024-07-30 PROCEDURE — 250N000009 HC RX 250: Performed by: STUDENT IN AN ORGANIZED HEALTH CARE EDUCATION/TRAINING PROGRAM

## 2024-07-30 PROCEDURE — 258N000003 HC RX IP 258 OP 636: Performed by: STUDENT IN AN ORGANIZED HEALTH CARE EDUCATION/TRAINING PROGRAM

## 2024-07-30 PROCEDURE — C8928 TTE W OR W/O FOL W/CON,STRES: HCPCS

## 2024-07-30 PROCEDURE — 93016 CV STRESS TEST SUPVJ ONLY: CPT | Performed by: STUDENT IN AN ORGANIZED HEALTH CARE EDUCATION/TRAINING PROGRAM

## 2024-07-30 PROCEDURE — 255N000002 HC RX 255 OP 636: Performed by: STUDENT IN AN ORGANIZED HEALTH CARE EDUCATION/TRAINING PROGRAM

## 2024-07-30 PROCEDURE — 93018 CV STRESS TEST I&R ONLY: CPT | Performed by: STUDENT IN AN ORGANIZED HEALTH CARE EDUCATION/TRAINING PROGRAM

## 2024-07-30 PROCEDURE — 93325 DOPPLER ECHO COLOR FLOW MAPG: CPT | Mod: 26 | Performed by: STUDENT IN AN ORGANIZED HEALTH CARE EDUCATION/TRAINING PROGRAM

## 2024-07-30 PROCEDURE — 250N000011 HC RX IP 250 OP 636: Performed by: STUDENT IN AN ORGANIZED HEALTH CARE EDUCATION/TRAINING PROGRAM

## 2024-07-30 RX ORDER — ATROPINE SULFATE 0.4 MG/ML
.2-.4 AMPUL (ML) INJECTION
Status: DISCONTINUED | OUTPATIENT
Start: 2024-07-30 | End: 2024-07-31 | Stop reason: HOSPADM

## 2024-07-30 RX ORDER — LIDOCAINE 40 MG/G
CREAM TOPICAL
Status: DISCONTINUED | OUTPATIENT
Start: 2024-07-30 | End: 2024-07-31 | Stop reason: HOSPADM

## 2024-07-30 RX ORDER — DOBUTAMINE HYDROCHLORIDE 200 MG/100ML
10 INJECTION INTRAVENOUS CONTINUOUS
Status: ACTIVE | OUTPATIENT
Start: 2024-07-30 | End: 2024-07-30

## 2024-07-30 RX ORDER — METOPROLOL TARTRATE 1 MG/ML
1-10 INJECTION, SOLUTION INTRAVENOUS
Status: ACTIVE | OUTPATIENT
Start: 2024-07-30 | End: 2024-07-30

## 2024-07-30 RX ADMIN — DOBUTAMINE 10 MCG/KG/MIN: 12.5 INJECTION, SOLUTION, CONCENTRATE INTRAVENOUS at 11:21

## 2024-07-30 RX ADMIN — METOPROLOL TARTRATE 3 MG: 5 INJECTION INTRAVENOUS at 11:30

## 2024-07-30 RX ADMIN — PERFLUTREN 4 ML: 6.52 INJECTION, SUSPENSION INTRAVENOUS at 11:30

## 2024-07-30 NOTE — PROGRESS NOTES
Pt here for dobutamine stress test. Test, meds and side effects reviewed with patient. Achieved target HR at 20 mcg Dobutamine. Gave a total of 3 mg IV Metoprolol to bring HR back to baseline. Post monitoring complete and VSS. Pt escorted out to the gold waiting room.

## 2024-07-31 ENCOUNTER — TELEPHONE (OUTPATIENT)
Dept: TRANSPLANT | Facility: CLINIC | Age: 69
End: 2024-07-31
Payer: COMMERCIAL

## 2024-07-31 ENCOUNTER — APPOINTMENT (OUTPATIENT)
Dept: INTERPRETER SERVICES | Facility: CLINIC | Age: 69
End: 2024-07-31
Payer: COMMERCIAL

## 2024-07-31 ASSESSMENT — ENCOUNTER SYMPTOMS: NEW SYMPTOMS OF CORONARY ARTERY DISEASE: 0

## 2024-07-31 NOTE — TELEPHONE ENCOUNTER
Increased patient's g-tube feeding by 10 ml at 11:00 am. Patient is tolerating feeding well. 
Will monitor  and increase it by 10 ml in 4-6 hours as directed by dietician. Pt called using .  DSE results reviewed with pt.  Pt states understanding.

## 2024-08-03 ENCOUNTER — HEALTH MAINTENANCE LETTER (OUTPATIENT)
Age: 69
End: 2024-08-03

## 2024-08-06 ENCOUNTER — ALLIED HEALTH/NURSE VISIT (OUTPATIENT)
Dept: EDUCATION SERVICES | Facility: CLINIC | Age: 69
End: 2024-08-06
Payer: COMMERCIAL

## 2024-08-06 VITALS — BODY MASS INDEX: 30.09 KG/M2 | WEIGHT: 180.8 LBS

## 2024-08-06 DIAGNOSIS — E11.3513 TYPE 2 DIABETES MELLITUS WITH PROLIFERATIVE RETINOPATHY OF BOTH EYES AND MACULAR EDEMA, UNSPECIFIED WHETHER LONG TERM INSULIN USE (H): Primary | ICD-10-CM

## 2024-08-06 PROCEDURE — G0108 DIAB MANAGE TRN  PER INDIV: HCPCS | Performed by: REGISTERED NURSE

## 2024-08-06 PROCEDURE — T1013 SIGN LANG/ORAL INTERPRETER: HCPCS | Mod: U4

## 2024-08-06 NOTE — PROGRESS NOTES
Diabetes Self-Management Education & Support     Lucian Oliveira presents today for education related to Type 2 diabetes    Patient is being treated with:  MDI Insulin  He is accompanied by self, with wife Shivani.    Visit was conducted with the assistance of a  over the phone.      Year of diagnosis: he thinks he has had diabetes for around 20 years.   Referring provider:  Marisabel Prieto PA-C  Living Situation: Lives with his wife, Shivani  Employment: Not employed.     Potential Barriers to Control:  poor literacy skills in English and Yi.  Speaks and understands very limited English.  His wife, Shivani seems to be in charge of his diabetes management.       Diabetes Complications:  NPDR, CKD, CAD/CHF with heart transplant in 2020.     PATIENT CONCERNS/REASON FOR REFERRAL :  Here for DSME follow up and ongoing insulin management.        ASSESSMENT:    Taking Medication:     Current Diabetes Management per Patient:  Taking diabetes medications? yes:     Diabetes Medication(s)       Insulin       HUMALOG KWIKPEN 100 UNIT/ML soln Give 10 units with breakfast, 10 units with lunch,  and 5 units with dinner.  Average daily use is 25 units     insulin degludec (TRESIBA) 200 UNIT/ML pen Inject 60 Units Subcutaneous every morning Order pen needles too     insulin NPH (HUMULIN N KWIKPEN) 100 UNIT/ML injection GIve 50 units each morning and 13 at night subcutaneous       Sodium-Glucose Co-Transporter 2 (SGLT2) Inhibitors       JARDIANCE 10 MG TABS tablet TOME 1 TABLETA POR LA BOCA CADA  LISA       Incretin Mimetic Agents       Dulaglutide (TRULICITY) 4.5 MG/0.5ML SOPN Inject 4.5 mg Subcutaneous once a week            Monitoring    Patient glucose self monitoring as follows: continuously using a continuous glucose monitor (CGM)    CGM: Dexcom G7  BG results: Dexcom CGM report appears below:            Patient's most recent   Lab Results   Component Value Date    A1C 7.3 07/22/2024    A1C 6.7  01/14/2021      Patient's A1C goal: <7.0    Activity: no regular exercise program    Healthy Eating:  He is working on reducing his portion sizes but still likes to snack between meals.      Problem Solving:      Patient is at risk of hypoglycemia?: Yes, but frequency is rare.   Hospitalizations for hyper or hypoglycemia: No    EDUCATION and INSTRUCTION PROVIDED AT THIS VISIT:       Reviewed his report with him and his wife.  His time in range has risen from 35% to 49% over the past three weeks.    He is currently taking 60 units daily of Tresiba U-200, and Humalog before breakfast and lunch but it sounds like not always at dinner.  Between meals recently he has been eating a cup of watermelon and a cup of cantelope, but not covering with insulin which accounts for his glucose staying high throughout the day.      We discussed how to manage snacking:  It seems that he sort of grazes throughout the day, which is difficult to manage unless he is willing to consolidate his snacks around mealtime or take a dose of Humalog with his snacks.  He was clearly not in favor of the idea of giving more injections, so encouraged him to minimize his snacks between meals or add the snack onto the end of the meal and give himself a higher dose of Humalog before starting the meal.      We also discussed the idea of using the Cequr Simplicity device. Using this device would facilitate his ability to cover all food eaten without more injections, and would improve his compliance, I believe.  Will reach out to our pharmacy team to see if he has coverage for it and then we will set up an appointment to train him on the device.      Patient-stated goal written and given to Lucian Oliveira.  Verbalized and demonstrated understanding of instructions.     PLAN:    See patient instructions  AVS printed and given to patient    FOLLOW-UP:    Appointment to train on the Cequr device and if not approved, will follow up with him in 3 months.       Time spent with patient at today's visit was 60 minutes.      Any diabetes medication dose changes were made via the CDE Protocol and Collaborative Practice [facility].  A copy of this encounter was provided to patient's referring provider.

## 2024-08-06 NOTE — PROGRESS NOTES
Diabetes Educator Note:     Called Lucian and Shivani today to inquire as to whether or not they had picked up the Tresiba insulin.  They had.    Repeated instructions to take the NPH insulin they have been using tonight as usual (13 units).   Then start the Tresiba insulin in the morning--60 units.  Stop taking all NPH insulin.    Take 60 units of Tresiba every AM.  Keep the rapid acting insulin dosing as it currently stands.     Follow up appointment in 3 weeks.   Instructed to call if they start to notice more hypoglycemia.     Bre Mi, CURRYN, RN, [facility]  Certified Diabetes Care and   [facility] Endocrinology and Diabetes   Clin[facility]  Phone [phone]  Hours:  Tuesday:       In Clinic and Virtual Visits  8am to 4pm              Wednesday:  In Clinic and Virtual Visits  8am to 4pm               Thursday:     Virtural  Visits only             8am to 4pm

## 2024-08-07 DIAGNOSIS — Z94.1 HEART REPLACED BY TRANSPLANT (H): ICD-10-CM

## 2024-08-07 DIAGNOSIS — E11.21 TYPE 2 DIABETES MELLITUS WITH DIABETIC NEPHROPATHY, WITH LONG-TERM CURRENT USE OF INSULIN (H): ICD-10-CM

## 2024-08-07 DIAGNOSIS — Z79.4 TYPE 2 DIABETES MELLITUS WITH DIABETIC NEPHROPATHY, WITH LONG-TERM CURRENT USE OF INSULIN (H): ICD-10-CM

## 2024-08-07 RX ORDER — SULFAMETHOXAZOLE AND TRIMETHOPRIM 400; 80 MG/1; MG/1
1 TABLET ORAL
Qty: 36 TABLET | Refills: 3 | Status: CANCELLED | OUTPATIENT
Start: 2024-08-08

## 2024-08-07 RX ORDER — SULFAMETHOXAZOLE AND TRIMETHOPRIM 400; 80 MG/1; MG/1
1 TABLET ORAL
Qty: 36 TABLET | Refills: 3 | Status: SHIPPED | OUTPATIENT
Start: 2024-08-08 | End: 2024-08-13

## 2024-08-08 RX ORDER — SUB-Q INSULIN DEVICE, 40 UNIT
EACH MISCELLANEOUS
Qty: 30 EACH | Refills: 4 | OUTPATIENT
Start: 2024-08-08

## 2024-08-09 DIAGNOSIS — E11.21 TYPE 2 DIABETES MELLITUS WITH DIABETIC NEPHROPATHY, WITH LONG-TERM CURRENT USE OF INSULIN (H): ICD-10-CM

## 2024-08-09 DIAGNOSIS — Z79.4 TYPE 2 DIABETES MELLITUS WITH DIABETIC NEPHROPATHY, WITH LONG-TERM CURRENT USE OF INSULIN (H): ICD-10-CM

## 2024-08-09 NOTE — TELEPHONE ENCOUNTER
Pt is switching pharmacy's, we do not have the previous provider on file to request.     Please review and verify, send if appropriate  and send new rx. Thank you!  [facility]/mail pharmacy  [phone]

## 2024-08-13 ENCOUNTER — TELEPHONE (OUTPATIENT)
Dept: TRANSPLANT | Facility: CLINIC | Age: 69
End: 2024-08-13
Payer: COMMERCIAL

## 2024-08-13 ENCOUNTER — APPOINTMENT (OUTPATIENT)
Dept: INTERPRETER SERVICES | Facility: CLINIC | Age: 69
End: 2024-08-13
Payer: COMMERCIAL

## 2024-08-13 DIAGNOSIS — Z94.1 HEART REPLACED BY TRANSPLANT (H): ICD-10-CM

## 2024-08-13 DIAGNOSIS — E11.21 TYPE 2 DIABETES MELLITUS WITH DIABETIC NEPHROPATHY, WITH LONG-TERM CURRENT USE OF INSULIN (H): ICD-10-CM

## 2024-08-13 DIAGNOSIS — Z79.4 TYPE 2 DIABETES MELLITUS WITH DIABETIC NEPHROPATHY, WITH LONG-TERM CURRENT USE OF INSULIN (H): ICD-10-CM

## 2024-08-13 DIAGNOSIS — E11.3513 TYPE 2 DIABETES MELLITUS WITH PROLIFERATIVE RETINOPATHY OF BOTH EYES AND MACULAR EDEMA, UNSPECIFIED WHETHER LONG TERM INSULIN USE (H): Primary | ICD-10-CM

## 2024-08-13 RX ORDER — FUROSEMIDE 20 MG
20 TABLET ORAL DAILY
Qty: 7 TABLET | Refills: 0 | Status: SHIPPED | OUTPATIENT
Start: 2024-08-13 | End: 2024-08-19

## 2024-08-13 RX ORDER — SULFAMETHOXAZOLE AND TRIMETHOPRIM 400; 80 MG/1; MG/1
1 TABLET ORAL
Qty: 12 TABLET | Refills: 0 | Status: SHIPPED | OUTPATIENT
Start: 2024-08-13

## 2024-08-13 NOTE — TELEPHONE ENCOUNTER
Patient Call: General  Route to LPN    Reason for call: Pt called regarding issues with medications. Per , [facility] indicated they would receive orders in 7 days but pt will run out much sooner. Please send new Rx to the [facility] [place].     furosemide (LASIX) 20 MG tablet   empagliflozin (JARDIANCE) 10 MG TABS tablet   sulfamethoxazole-trimethoprim (BACTRIM) 400-80 MG tablet     Call back needed? No

## 2024-08-15 DIAGNOSIS — E11.21 TYPE 2 DIABETES MELLITUS WITH DIABETIC NEPHROPATHY, WITH LONG-TERM CURRENT USE OF INSULIN (H): ICD-10-CM

## 2024-08-15 DIAGNOSIS — Z79.4 TYPE 2 DIABETES MELLITUS WITH DIABETIC NEPHROPATHY, WITH LONG-TERM CURRENT USE OF INSULIN (H): ICD-10-CM

## 2024-08-16 DIAGNOSIS — I25.739 CORONARY ARTERY DISEASE INVOLVING NONAUTOLOGOUS BIOLOGICAL CORONARY BYPASS GRAFT WITH ANGINA PECTORIS (H): ICD-10-CM

## 2024-08-16 DIAGNOSIS — Z79.4 TYPE 2 DIABETES MELLITUS WITH HYPERGLYCEMIA, WITH LONG-TERM CURRENT USE OF INSULIN (H): ICD-10-CM

## 2024-08-16 DIAGNOSIS — E78.5 DYSLIPIDEMIA: ICD-10-CM

## 2024-08-16 DIAGNOSIS — E11.65 TYPE 2 DIABETES MELLITUS WITH HYPERGLYCEMIA, WITH LONG-TERM CURRENT USE OF INSULIN (H): ICD-10-CM

## 2024-08-17 NOTE — TELEPHONE ENCOUNTER
Call complete via  for pre procedure reminder, travel screen and updated visitor policy.  COVID Negative    
show

## 2024-08-18 DIAGNOSIS — Z94.1 HEART REPLACED BY TRANSPLANT (H): ICD-10-CM

## 2024-08-19 RX ORDER — FUROSEMIDE 20 MG
20 TABLET ORAL DAILY
Qty: 7 TABLET | Refills: 0 | Status: SHIPPED | OUTPATIENT
Start: 2024-08-19 | End: 2024-08-29

## 2024-08-21 RX ORDER — OMEGA-3-ACID ETHYL ESTERS 1 G/1
CAPSULE, LIQUID FILLED ORAL
Qty: 100 CAPSULE | Refills: 2 | Status: SHIPPED | OUTPATIENT
Start: 2024-08-21

## 2024-08-21 NOTE — TELEPHONE ENCOUNTER
LVD:  4/16/2024  [facility] Marisabel Go PA-C     Lab Results   Component Value Date    CHOL 133 07/22/2024    CHOL 133 01/05/2021     Lab Results   Component Value Date    HDL 31 07/22/2024    HDL 40 01/05/2021     Lab Results   Component Value Date    LDL 49 07/22/2024    LDL 58 01/05/2021     Lab Results   Component Value Date    TRIG 263 07/22/2024    TRIG 179 01/05/2021     Lab Results   Component Value Date    CHOLHDLRATIO 2.6 06/27/2015      Refilled per protocol.

## 2024-08-23 DIAGNOSIS — E11.21 TYPE 2 DIABETES MELLITUS WITH DIABETIC NEPHROPATHY, WITH LONG-TERM CURRENT USE OF INSULIN (H): ICD-10-CM

## 2024-08-23 DIAGNOSIS — Z79.4 TYPE 2 DIABETES MELLITUS WITH DIABETIC NEPHROPATHY, WITH LONG-TERM CURRENT USE OF INSULIN (H): ICD-10-CM

## 2024-08-23 NOTE — PROGRESS NOTES
Lucian is a 68-year-old male with a complicated medical history including cardiac transplant in July 2020, chronic kidney disease stage IV, congestive heart failure, and a history of heparin-induced thrombocytopenia.  He has been hospitalized previously for having difficulty following diabetes regimen.  He does not read or write well in Romanian and relies heavily on his wife Shivani for support with his medications.  His diabetes is also complicated by nonproliferative diabetic retinopathy.    Lucian and Shivani here in clinic today to follow-up his type 2 diabetes.    We last met in April and again discussed transition from NPH insulin to once daily basal insulin.  He and Shivani since have been meeting with Priscilla Mi to work on this transition.  The last met with her in August and at that point he was taking 60 units of Tresiba once daily Jardiance 10 mg daily, Trulicity 4.5 mg daily and Humalog with breakfast, but not always dinner and higher blood sugars in the afternoon related to snacking.  He continued to also use NPH insulin 50 units with breakfast and 13 units at bedtime.  They discussed the possibility of starting to use a Cequr device to cover his meals.    Today:  A1c today is 7.3%.    He is feeling very good about his blood sugar management.  He is now on just 2 types of insulin Tresiba and Humalog on fixed doses and feels things are going quite right.  He is interested in the possibility of seek care as he thinks this would help limit the blood sugar rise when he snacks which she continues to do generally between breakfast and lunch and with watermelon or other types of melon banana pineapple mostly.  He continues to have breakfast around 730 or 8 AM and takes both his Tresiba and 10 units of Humalog prior to that.  He then has his main meal between 1 and 2 PM.  He then will have dinner between 6 and 7 but both he and his wife Shivani reports that he is eating very little at that point in time.  He  generally have coffee with smaller piece of bread or sometimes just tea.  He knows that winter is coming and he is wondering if he has a gym benefit with his insurance and how he would use that.    He quit taking Trulicity as he felt he was losing too much weight.  He would be open to taking a lower dose if needed.  He denies any difficulties with upset stomach or other reasons that he quit taking, he was just worried he was losing too much weight.      DM medications:  Tresiba 60 units  Breakfast Humalog 10 with breakfast and 10 with lunch and 5 with dinner and any large snack.  Jardiance 10 mg daily    CGM data:  He continues to use a elaina device.  Average blood sugar in the last 14 days is 178 which correlates to a GMI of 7.6%.  Glucose variance is 27.4%.  He is 53% time in range and 0% low.     BG: as above.      Objective:  Pleasant Greenlandic-speaking male in no acute distress.  Accompanied by his wife and both are quite knowledgeable about his current medication doses.  Mood is good affect is appropriate.  respirations are easy and unlabored.   There is no jugular venous distention or other neck masses.  Skin is warm and moist.  Cranial nerves II through XII are grossly intact.  He ambulates independently with nonantalgic gait.  No swelling is appreciated in the lower extremities.    Wt Readings from Last 10 Encounters:   09/17/24 83 kg (183 lb)   08/06/24 82 kg (180 lb 12.8 oz)   07/22/24 82.6 kg (182 lb)   04/16/24 79.8 kg (176 lb)   04/16/24 79.1 kg (174 lb 4.8 oz)   04/01/24 79 kg (174 lb 1.6 oz)   01/16/24 80.3 kg (177 lb)   01/15/24 81.6 kg (180 lb)   01/10/24 81.8 kg (180 lb 4.8 oz)   12/26/23 81.7 kg (180 lb 3.2 oz)           Recent Labs   Lab Test 07/22/24  0711 04/16/24  1134 04/16/24  1033 08/14/23  0917 07/28/23  1226 11/30/21  0859 11/17/21  1126 08/10/20  0845 08/05/20  0843 07/07/20  1732 07/07/20  0720 08/16/18  0834 08/06/18  0000 04/30/18  1148 04/30/18  0000   A1C 7.3* 7.6*  --    < >  --     < >  --    < >  --   --   --    < >  --   --   --    HEMOGLOBINA1  --   --   --   --   --   --   --   --   --   --   --   --  8.3*  --  10.6*   TSH  --   --   --   --   --   --   --   --  0.80  --  1.30   < >  --   --   --    LDL 49  --  46  --   --    < >  --    < >  --   --   --    < >  --   --   --    HDL 31*  --  35*  --   --    < >  --    < >  --   --   --    < >  --   --   --    TRIG 263*  --  246*  --   --    < >  --    < >  --   --   --    < >  --   --   --    CR 2.25*  --  2.22*   < >  --    < >  --    < > 1.70*   < >  --    < >  --    < >  --    MICROL  --   --   --   --  21.4  --  146  --   --   --   --    < >  --   --   --     < > = values in this interval not displayed.        Cholesterol   Date Value Ref Range Status   07/22/2024 133 <200 mg/dL Final   01/05/2021 133 <200 mg/dL Final       GFR Estimate   Date Value Ref Range Status   07/22/2024 31 (L) >60 mL/min/1.73m2 Final     Comment:     eGFR calculated using 2021 CKD-EPI equation.   04/16/2024 31 (L) >60 mL/min/1.73m2 Final   01/15/2024 33 (L) >60 mL/min/1.73m2 Final   05/11/2021 37 (L) >60 mL/min/[1.73_m2] Final     Comment:     Non  GFR Calc  Starting 12/18/2018, serum creatinine based estimated GFR (eGFR) will be   calculated using the Chronic Kidney Disease Epidemiology Collaboration   (CKD-EPI) equation.     03/02/2021 37 (L) >60 mL/min/[1.73_m2] Final     Comment:     Non  GFR Calc  Starting 12/18/2018, serum creatinine based estimated GFR (eGFR) will be   calculated using the Chronic Kidney Disease Epidemiology Collaboration   (CKD-EPI) equation.     01/14/2021 41 (L) >60 mL/min/[1.73_m2] Final     Comment:     Non  GFR Calc  Starting 12/18/2018, serum creatinine based estimated GFR (eGFR) will be   calculated using the Chronic Kidney Disease Epidemiology Collaboration   (CKD-EPI) equation.       Hemoglobin   Date Value Ref Range Status   07/22/2024 10.7 (L) 13.3 - 17.7 g/dL Final   05/11/2021  13.4 13.3 - 17.7 g/dL Final   ]      FIB-4 Calculation: 2.37 at 11/17/2023  5:56 AM  Calculated from:  SGOT/AST: 17 U/L at 10/25/2023 10:05 AM  SGPT/ALT: 13 U/L at 10/25/2023 10:05 AM  Platelets: 139 10e3/uL at 11/17/2023  5:56 AM  Age: 68 years      No hx elevated Amylase or lipase.     EXAMINATION: CT ABDOMEN PELVIS W/O CONTRAST, 10/11/2023 7:07 PM   Pancreas: Mild fatty atrophy of the pancreas. No focal mass or  dilation of the main pancreatic duct.    EXAMINATION: US ABDOMEN COMPLETE, 7/9/2019 6:54 AM   Pancreas: Visualized portions of the head and body of the pancreas are  unremarkable.     Assessment and plan:  Type 2 diabetes    He is interested in using Cequr device to help with his 4 times daily Humalog injections as he admits he is sometimes omitting these and would like an easier way to deliver them.  It is not covered under his Medicare part D so I will try submitting this through the mail order pharmacy to see if it can be covered otherwise.  Otherwise we could look at a V-Go device or potentially insulin pump such as OmniPod.  For now he will continue his current dosing and management and will follow-up with Priscilla Mi in 4 to 6 weeks at which point he can begin lowering the c-collar if [facility] is able to mail this to him.  Otherwise I would like him to resume a low-dose of the GLP-1 agonist therapy for prevention of progression of fatty liver disease.      Dm Complications:  Foot exam updated today. No concerns.  Eye exam utd, retinopathy stabilizing.  Has cardiology and nephrology follow-up - Discussed continuing Jardiance for now.  GFR remains >30, but has further lab work scheduled.      Elevated fib 4: It does appear he is at risk for fatty liver, and if so I would want to resume his GLP  1 inhibitor therapy.  Will discuss this versus proceeding to an ultrasound at his return to clinic.      Return to clinic 6 months.         51 minutes spent on the date of the encounter doing chart  review, history and exam, documentation, education and counseling, as well as communication and coordination of care, and further activities as noted above.  This time excludes time spent reviewing CGM.  It is my privilege to be involved in the care of the above patient.     Marisabel Prieto PA-C, MPAS  [facility]  Diabetes, Endocrinology, and Metabolism  [phone] Appointments/Nurse Line  [phone] Fax  [phone] pager  On [facility] (inpatient)

## 2024-08-26 DIAGNOSIS — E11.21 TYPE 2 DIABETES MELLITUS WITH DIABETIC NEPHROPATHY, WITH LONG-TERM CURRENT USE OF INSULIN (H): ICD-10-CM

## 2024-08-26 DIAGNOSIS — N18.32 STAGE 3B CHRONIC KIDNEY DISEASE (CKD) (H): Primary | ICD-10-CM

## 2024-08-26 DIAGNOSIS — Z94.1 HEART REPLACED BY TRANSPLANT (H): ICD-10-CM

## 2024-08-26 DIAGNOSIS — Z79.4 TYPE 2 DIABETES MELLITUS WITH DIABETIC NEPHROPATHY, WITH LONG-TERM CURRENT USE OF INSULIN (H): ICD-10-CM

## 2024-08-27 ENCOUNTER — APPOINTMENT (OUTPATIENT)
Dept: INTERPRETER SERVICES | Facility: CLINIC | Age: 69
End: 2024-08-27
Payer: COMMERCIAL

## 2024-08-27 ENCOUNTER — TELEPHONE (OUTPATIENT)
Dept: NEPHROLOGY | Facility: CLINIC | Age: 69
End: 2024-08-27
Payer: COMMERCIAL

## 2024-08-27 NOTE — TELEPHONE ENCOUNTER
Patient Contacted for the patient to call back and schedule the following:    Appointment type: Return Nephrology  Provider: Hiral Huffman  Return date: 12/5  Specialty phone number: [phone]  Additional appointment(s) needed: Labs  Additonal Notes:  needed

## 2024-08-29 ENCOUNTER — OFFICE VISIT (OUTPATIENT)
Dept: OPHTHALMOLOGY | Facility: CLINIC | Age: 69
End: 2024-08-29
Attending: OPHTHALMOLOGY
Payer: COMMERCIAL

## 2024-08-29 DIAGNOSIS — Z94.1 HEART REPLACED BY TRANSPLANT (H): ICD-10-CM

## 2024-08-29 DIAGNOSIS — E11.3513 TYPE 2 DIABETES MELLITUS WITH PROLIFERATIVE RETINOPATHY OF BOTH EYES AND MACULAR EDEMA, UNSPECIFIED WHETHER LONG TERM INSULIN USE (H): ICD-10-CM

## 2024-08-29 PROCEDURE — 99214 OFFICE O/P EST MOD 30 MIN: CPT | Performed by: OPHTHALMOLOGY

## 2024-08-29 PROCEDURE — 92134 CPTRZ OPH DX IMG PST SGM RTA: CPT | Performed by: OPHTHALMOLOGY

## 2024-08-29 PROCEDURE — G0463 HOSPITAL OUTPT CLINIC VISIT: HCPCS | Performed by: OPHTHALMOLOGY

## 2024-08-29 RX ORDER — FUROSEMIDE 20 MG
20 TABLET ORAL DAILY
Qty: 30 TABLET | Refills: 3 | Status: SHIPPED | OUTPATIENT
Start: 2024-08-29 | End: 2024-08-30

## 2024-08-29 ASSESSMENT — REFRACTION_WEARINGRX
OD_AXIS: 017
OS_SPHERE: BALANCE
OD_CYLINDER: +1.00
OD_SPHERE: -0.50
SPECS_TYPE: LAST MR
OD_ADD: +2.50

## 2024-08-29 ASSESSMENT — EXTERNAL EXAM - LEFT EYE: OS_EXAM: NORMAL

## 2024-08-29 ASSESSMENT — VISUAL ACUITY
OD_CC+: -1
OS_CC: HM
METHOD: SNELLEN - LINEAR
OD_CC: 20/30

## 2024-08-29 ASSESSMENT — SLIT LAMP EXAM - LIDS
COMMENTS: NORMAL
COMMENTS: NORMAL

## 2024-08-29 ASSESSMENT — CUP TO DISC RATIO: OD_RATIO: 0.25

## 2024-08-29 ASSESSMENT — TONOMETRY
OD_IOP_MMHG: 20
OS_IOP_MMHG: 17
IOP_METHOD: TONOPEN

## 2024-08-29 ASSESSMENT — EXTERNAL EXAM - RIGHT EYE: OD_EXAM: NORMAL

## 2024-08-29 NOTE — NURSING NOTE
Chief Complaints and History of Present Illnesses   Patient presents with    Diabetic Retinopathy Follow Up     Chief Complaint(s) and History of Present Illness(es)       Diabetic Retinopathy Follow Up              Laterality: both eyes    Onset: gradual    Onset: months ago    Quality: States va is the same since last visit      Associated symptoms: Negative for photophobia, flashes and floaters    Treatments tried: eye drops    Pain scale: 0/10              Comments    Here for 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, unspecified whether long term insulin use  Latanoprost at bedtime both eyes      Lab Results       Component                Value               Date                       A1C                      7.3                 07/22/2024                 A1C                      7.6                 04/16/2024                 A1C                      6.9                 01/16/2024                 A1C                      8.1                 11/07/2023                 A1C                      8.7                 07/17/2023                 A1C                      8.3                 02/02/2023                 A1C                      8.4                 09/12/2022                 A1C                      7.4                 03/14/2022                 A1C                      6.7                 01/14/2021                 A1C                      5.9                 12/07/2020                 A1C                      8.2                 07/03/2020                 A1C                      7.9                 07/08/2019                 A1C                      8.5                 03/11/2019             Robyn Rios COT 8:21 AM August 29, 2024

## 2024-08-29 NOTE — PROGRESS NOTES
CC: follow up proliferative diabetic retinopathy    Interval:   Patient feels vision is stable each eye in the last several weeks. No flashes or floaters.  Ocular meds:   - Prednisolone daily left eye & ketorolac once daily in right eye only. Latanoprost both eyes once daily.     HPI: 68 year old man PMHx of CAD (s/p 3v CABG 2008), ischemic CM, now s/p orthotopic heart transplant 07/2020, CKD, HTN, HLD, obesity, CHANTEL and IDDM2 with history of PDR both eyes who presented to [facility] in 05/17/2019 with bilateral vitreous hemorrhages.      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 Good foveal contour  Left eye: Irregular retina; ERM; nasal to fovea with large bullous edema-largely stable    FA 10/26/23  right eye: PRP scars staining. Mild late macula edema;  Foci of NV superonasal- stable - mild staining/leakage- decreased compared to fluorescein angiography from 2023  Left eye : staining of the Chorioretinal  scars     optos consistent with exam 1/4/24    DVF 10/26/23   Right eye: H 110 V 75  Left eye: H only sup area of 50 degrees V70     Assessment and Plan    # history of Proliferative Diabetic Retinopathy, right eye    # with Diabetic macular edema right eye   Optical Coherence Tomography 07/01/24  with mild worsening of mild cystoid macular edema  Currently off of eyedrops; was on ketorolac and Predforte  in the past  - Fluorescein angiography 07/01/24 with Foci of NV superonasal- stable - mild staining/leakage- decreased compared to fluorescein angiography from 2023  - Status post laser filling 10/26/23   -Consider avastin if Diabetic macular edema worsen  08/29/24 improved Diabetic macular edema - observe    # Vitreous hemorrhage, right eye -  Resolved  (onset 4/2021)   - Responded to multiple Avastin, nearly resolved  08/04/2022   - R/B/A Fill-in PRP right eye 08/24/2022: #764 spots   -  1.25.23 last Avastin right eye     # Proliferative Diabetic Retinopathy, left eye     - NVI, left eye 6/2/2022   - Gonio 6/2/2022 broad PAS superiorly ~3 clock hours, narrow PAS inferiorly <1 clock hour, no NVA   - Regressing 08/04/2022   - s/p PRP 6/5/2019   - s/p avastin last injection 06/02/2022   - 03/09/23 VA stable CF2' - continue to observe given chronic Retinal detachment   # history of Funnel RD left eye   - progressed to funnel RD after loss to follow up given heart surgeries     - w retina folded on itself under SO   - guarded prognosis previously discussed   - Drops as below    # s/p CEIOL OD 2/6/23.  doing well.     # Ocuar HTN, both eyes    - 06/15/23 IOP 24/20.   -07/27/23 intraocular pressure under good control on latanoprost at bedtime both eyes   03/28/24 18 both eyes     PLAN:   - Continue latanoprost at bedtime both eyes   - follow up in 2 months with Optical Coherence Tomography - if worsening Diabetic macular edema will restart anti-vegf   Retina detachment precautions were discussed with the patient (presence or increased in flashes, floaters or a curtain in the visual field) and was asked to return if any of the those occur  - follow up in 3 months with Optical Coherence Tomography and retinal nerve fiber layer - if worsening ocular HTN consider adding a second eyedrop    ~~~~~~~~~~~~~~~~~~~~~~~~~~~~~~~~~~   Complete documentation of historical and exam elements from today's encounter can be found in the full encounter summary report (not reduplicated in this progress note).  I personally obtained the chief complaint(s) and history of present illness.  I confirmed and edited as necessary the review of systems, past medical/surgical history, family history, social history, and examination findings as documented by others; and I examined the patient myself.  I personally reviewed the relevant tests, images, and reports as  documented above.  I formulated and edited as necessary the assessment and plan and discussed the findings and management plan with the patient and family    Triny Bunch MD  Professor of Ophthalmology  Vitreo-Retinal surgeon   Department of Ophthalmology and Visual Neurosciences   [facility]  Phone: [phone]   Fax: [phone]

## 2024-08-30 RX ORDER — FUROSEMIDE 20 MG
20 TABLET ORAL DAILY
Qty: 90 TABLET | Refills: 3 | Status: SHIPPED | OUTPATIENT
Start: 2024-08-30

## 2024-09-07 DIAGNOSIS — E11.21 TYPE 2 DIABETES MELLITUS WITH DIABETIC NEPHROPATHY, WITH LONG-TERM CURRENT USE OF INSULIN (H): ICD-10-CM

## 2024-09-07 DIAGNOSIS — Z79.4 TYPE 2 DIABETES MELLITUS WITH DIABETIC NEPHROPATHY, WITH LONG-TERM CURRENT USE OF INSULIN (H): ICD-10-CM

## 2024-09-09 RX ORDER — ACYCLOVIR 400 MG/1
TABLET ORAL
Qty: 10 EACH | Refills: 2 | Status: SHIPPED | OUTPATIENT
Start: 2024-09-09

## 2024-09-09 NOTE — TELEPHONE ENCOUNTER
Last Clinic Visit: 4/16/2024  [facility] [place]    Scheduled follow-up 9/17/24

## 2024-09-12 ENCOUNTER — TELEPHONE (OUTPATIENT)
Dept: FAMILY MEDICINE | Facility: CLINIC | Age: 69
End: 2024-09-12
Payer: COMMERCIAL

## 2024-09-12 NOTE — TELEPHONE ENCOUNTER
Patient Quality Outreach    Patient is due for the following:   Hypertension -  BP check  Physical Annual Wellness Visit    Next Steps:   See below    Type of outreach:    Sent letter.    Next Steps:  Reach out within 90 days via Letter.    Max number of attempts reached: Yes. Will try again in 90 days if patient still on fail list.    Questions for provider review:    None           Marisel Sharma [facility]  Chart routed to Closing encounter  .

## 2024-09-12 NOTE — LETTER
September 12, 2024    To  Lucian Oliveira  [address] [address]    Your team at [facility] cares about your health. We have reviewed your chart and based on our findings; we are making the following recommendations to better manage your health.     You are in particular need of attention regarding the following:     HYPERTENSION FOLLOW UP: Office Visit  PREVENTATIVE VISIT: Annual Medicare Wellness:Schedule an Annual Medicare Wellness Exam. Please call your [facility] clinic to set up your appointment.    If you have already completed these items, please contact the clinic via phone or   MyChart so your care team can review and update your records. Thank you for   choosing [facility] Clinics for your healthcare needs. For any questions,   concerns, or to schedule an appointment please contact our clinic at [phone].    Healthy Regards,      Your [facility] Care Team

## 2024-09-17 ENCOUNTER — PATIENT OUTREACH (OUTPATIENT)
Dept: CARE COORDINATION | Facility: CLINIC | Age: 69
End: 2024-09-17

## 2024-09-17 ENCOUNTER — OFFICE VISIT (OUTPATIENT)
Dept: ENDOCRINOLOGY | Facility: CLINIC | Age: 69
End: 2024-09-17
Payer: COMMERCIAL

## 2024-09-17 VITALS
SYSTOLIC BLOOD PRESSURE: 130 MMHG | BODY MASS INDEX: 30.49 KG/M2 | DIASTOLIC BLOOD PRESSURE: 69 MMHG | WEIGHT: 183 LBS | HEART RATE: 83 BPM | HEIGHT: 65 IN | OXYGEN SATURATION: 97 %

## 2024-09-17 DIAGNOSIS — Z79.4 TYPE 2 DIABETES MELLITUS WITH HYPERGLYCEMIA, WITH LONG-TERM CURRENT USE OF INSULIN (H): Primary | ICD-10-CM

## 2024-09-17 DIAGNOSIS — E11.65 TYPE 2 DIABETES MELLITUS WITH HYPERGLYCEMIA, WITH LONG-TERM CURRENT USE OF INSULIN (H): Primary | ICD-10-CM

## 2024-09-17 DIAGNOSIS — I25.739 CORONARY ARTERY DISEASE INVOLVING NONAUTOLOGOUS BIOLOGICAL CORONARY BYPASS GRAFT WITH ANGINA PECTORIS (H): ICD-10-CM

## 2024-09-17 DIAGNOSIS — Z59.71 INSURANCE COVERAGE PROBLEMS: ICD-10-CM

## 2024-09-17 DIAGNOSIS — Z79.4 INSULIN-REQUIRING OR DEPENDENT TYPE II DIABETES MELLITUS (H): ICD-10-CM

## 2024-09-17 DIAGNOSIS — Z94.1 HEART TRANSPLANT, ORTHOTOPIC, STATUS (H): ICD-10-CM

## 2024-09-17 DIAGNOSIS — E11.9 INSULIN-REQUIRING OR DEPENDENT TYPE II DIABETES MELLITUS (H): ICD-10-CM

## 2024-09-17 PROCEDURE — 99215 OFFICE O/P EST HI 40 MIN: CPT | Performed by: PHYSICIAN ASSISTANT

## 2024-09-17 RX ORDER — BISACODYL 5 MG/1
TABLET, DELAYED RELEASE ORAL
Qty: 4 TABLET | Refills: 0 | Status: SHIPPED | OUTPATIENT
Start: 2024-09-17

## 2024-09-17 RX ORDER — DIABETIC SUPPLIES,MISCELL
1 MISCELLANEOUS MISCELLANEOUS SEE ADMIN INSTRUCTIONS
Qty: 1 EACH | Refills: 1 | Status: SHIPPED | OUTPATIENT
Start: 2024-09-17

## 2024-09-17 RX ORDER — BOLUS INSULIN PUMP, 200 UNIT 2 UNIT
1 EACH MISCELLANEOUS SEE ADMIN INSTRUCTIONS
Qty: 40 EACH | Refills: 4 | Status: SHIPPED | OUTPATIENT
Start: 2024-09-17

## 2024-09-17 ASSESSMENT — PAIN SCALES - GENERAL: PAINLEVEL: NO PAIN (0)

## 2024-09-17 NOTE — PROGRESS NOTES
Social Work - Intervention  [facility]  Data/Intervention:    Patient Name: Lucian Oliveira Goes By: Lucian    /Age: 1955 (68 year old)     Visit Type: telephone  Referral Source: Endocrinology   Reason for Referral: Gym membership    Collaborated With:    -Patient, Lucian with Kenyan interpretor      Psychosocial Information/Concerns:  Lucian would like to get established at a gym if there is a benefit option that pays or reimburses the cost of a membership.      Intervention/Education/Resources Provided:  Shared with Lucian that [employer] has a fitness benefit called [employer] that he may be eligible for which covers the cost of a gym membership.     Assessment/Plan:  Per Patient request, [employer] message sent with a Japanese interpretor number to call before reaching out to his insurance advantage plan to determine if he is eligible and which gyms are affiliated with the program near him.     Provided patient/family with contact information and availability.  will remain available for support as needed.     KENIA Connors  Medical Social Worker    [facility]     [address] [place] [id number]  [email or web address]  Gender pronouns: she/her   Employed by [employer]

## 2024-09-17 NOTE — LETTER
9/17/2024       RE: Lucian Oliveira  [address] [facility] [facility] [address]     Dear Colleague,    Thank you for referring your patient, Lucain Oliveira, to the [facility] [place] at [facility]. Please see a copy of my visit note below.    Lucian is a 68-year-old male with a complicated medical history including cardiac transplant in July 2020, chronic kidney disease stage IV, congestive heart failure, and a history of heparin-induced thrombocytopenia.  He has been hospitalized previously for having difficulty following diabetes regimen.  He does not read or write well in Kyrgyz and relies heavily on his wife Shivani for support with his medications.  His diabetes is also complicated by nonproliferative diabetic retinopathy.    Lucian and Shivani here in clinic today to follow-up his type 2 diabetes.    We last met in April and again discussed transition from NPH insulin to once daily basal insulin.  He and Shivani since have been meeting with Priscilla Mi to work on this transition.  The last met with her in August and at that point he was taking 60 units of Tresiba once daily Jardiance 10 mg daily, Trulicity 4.5 mg daily and Humalog with breakfast, but not always dinner and higher blood sugars in the afternoon related to snacking.  He continued to also use NPH insulin 50 units with breakfast and 13 units at bedtime.  They discussed the possibility of starting to use a Cequr device to cover his meals.    Today:  A1c today is 7.3%.    He is feeling very good about his blood sugar management.  He is now on just 2 types of insulin Tresiba and Humalog on fixed doses and feels things are going quite right.  He is interested in the possibility of seek care as he thinks this would help limit the blood sugar rise when he snacks which she continues to do generally between breakfast and lunch and with watermelon or other types of melon  banana pineapple mostly.  He continues to have breakfast around 730 or 8 AM and takes both his Tresiba and 10 units of Humalog prior to that.  He then has his main meal between 1 and 2 PM.  He then will have dinner between 6 and 7 but both he and his wife Shivani reports that he is eating very little at that point in time.  He generally have coffee with smaller piece of bread or sometimes just tea.  He knows that winter is coming and he is wondering if he has a gym benefit with his insurance and how he would use that.    He quit taking Trulicity as he felt he was losing too much weight.  He would be open to taking a lower dose if needed.  He denies any difficulties with upset stomach or other reasons that he quit taking, he was just worried he was losing too much weight.      DM medications:  Tresiba 60 units  Breakfast Humalog 10 with breakfast and 10 with lunch and 5 with dinner and any large snack.  Jardiance 10 mg daily    CGM data:  He continues to use a elaina device.  Average blood sugar in the last 14 days is 178 which correlates to a GMI of 7.6%.  Glucose variance is 27.4%.  He is 53% time in range and 0% low.     BG: as above.      Objective:  Pleasant Colombian-speaking male in no acute distress.  Accompanied by his wife and both are quite knowledgeable about his current medication doses.  Mood is good affect is appropriate.  respirations are easy and unlabored.   There is no jugular venous distention or other neck masses.  Skin is warm and moist.  Cranial nerves II through XII are grossly intact.  He ambulates independently with nonantalgic gait.  No swelling is appreciated in the lower extremities.    Wt Readings from Last 10 Encounters:   09/17/24 83 kg (183 lb)   08/06/24 82 kg (180 lb 12.8 oz)   07/22/24 82.6 kg (182 lb)   04/16/24 79.8 kg (176 lb)   04/16/24 79.1 kg (174 lb 4.8 oz)   04/01/24 79 kg (174 lb 1.6 oz)   01/16/24 80.3 kg (177 lb)   01/15/24 81.6 kg (180 lb)   01/10/24 81.8 kg (180 lb 4.8 oz)    12/26/23 81.7 kg (180 lb 3.2 oz)           Recent Labs   Lab Test 07/22/24  0711 04/16/24  1134 04/16/24  1033 08/14/23  0917 07/28/23  1226 11/30/21  0859 11/17/21  1126 08/10/20  0845 08/05/20  0843 07/07/20  1732 07/07/20  0720 08/16/18  0834 08/06/18  0000 04/30/18  1148 04/30/18  0000   A1C 7.3* 7.6*  --    < >  --    < >  --    < >  --   --   --    < >  --   --   --    HEMOGLOBINA1  --   --   --   --   --   --   --   --   --   --   --   --  8.3*  --  10.6*   TSH  --   --   --   --   --   --   --   --  0.80  --  1.30   < >  --   --   --    LDL 49  --  46  --   --    < >  --    < >  --   --   --    < >  --   --   --    HDL 31*  --  35*  --   --    < >  --    < >  --   --   --    < >  --   --   --    TRIG 263*  --  246*  --   --    < >  --    < >  --   --   --    < >  --   --   --    CR 2.25*  --  2.22*   < >  --    < >  --    < > 1.70*   < >  --    < >  --    < >  --    MICROL  --   --   --   --  21.4  --  146  --   --   --   --    < >  --   --   --     < > = values in this interval not displayed.        Cholesterol   Date Value Ref Range Status   07/22/2024 133 <200 mg/dL Final   01/05/2021 133 <200 mg/dL Final       GFR Estimate   Date Value Ref Range Status   07/22/2024 31 (L) >60 mL/min/1.73m2 Final     Comment:     eGFR calculated using 2021 CKD-EPI equation.   04/16/2024 31 (L) >60 mL/min/1.73m2 Final   01/15/2024 33 (L) >60 mL/min/1.73m2 Final   05/11/2021 37 (L) >60 mL/min/[1.73_m2] Final     Comment:     Non  GFR Calc  Starting 12/18/2018, serum creatinine based estimated GFR (eGFR) will be   calculated using the Chronic Kidney Disease Epidemiology Collaboration   (CKD-EPI) equation.     03/02/2021 37 (L) >60 mL/min/[1.73_m2] Final     Comment:     Non  GFR Calc  Starting 12/18/2018, serum creatinine based estimated GFR (eGFR) will be   calculated using the Chronic Kidney Disease Epidemiology Collaboration   (CKD-EPI) equation.     01/14/2021 41 (L) >60 mL/min/[1.73_m2]  Final     Comment:     Non  GFR Calc  Starting 12/18/2018, serum creatinine based estimated GFR (eGFR) will be   calculated using the Chronic Kidney Disease Epidemiology Collaboration   (CKD-EPI) equation.       Hemoglobin   Date Value Ref Range Status   07/22/2024 10.7 (L) 13.3 - 17.7 g/dL Final   05/11/2021 13.4 13.3 - 17.7 g/dL Final   ]      FIB-4 Calculation: 2.37 at 11/17/2023  5:56 AM  Calculated from:  SGOT/AST: 17 U/L at 10/25/2023 10:05 AM  SGPT/ALT: 13 U/L at 10/25/2023 10:05 AM  Platelets: 139 10e3/uL at 11/17/2023  5:56 AM  Age: 68 years      No hx elevated Amylase or lipase.     EXAMINATION: CT ABDOMEN PELVIS W/O CONTRAST, 10/11/2023 7:07 PM   Pancreas: Mild fatty atrophy of the pancreas. No focal mass or  dilation of the main pancreatic duct.    EXAMINATION: US ABDOMEN COMPLETE, 7/9/2019 6:54 AM   Pancreas: Visualized portions of the head and body of the pancreas are  unremarkable.     Assessment and plan:  Type 2 diabetes    He is interested in using Cequr device to help with his 4 times daily Humalog injections as he admits he is sometimes omitting these and would like an easier way to deliver them.  It is not covered under his Medicare part D so I will try submitting this through the mail order pharmacy to see if it can be covered otherwise.  Otherwise we could look at a V-Go device or potentially insulin pump such as OmniPod.  For now he will continue his current dosing and management and will follow-up with Priscilla Mi in 4 to 6 weeks at which point he can begin lowering the c-collar if [facility] is able to mail this to him.  Otherwise I would like him to resume a low-dose of the GLP-1 agonist therapy for prevention of progression of fatty liver disease.      Dm Complications:  Foot exam updated today. No concerns.  Eye exam utd, retinopathy stabilizing.  Has cardiology and nephrology follow-up - Discussed continuing Jardiance for now.  GFR remains >30, but has further  lab work scheduled.      Elevated fib 4: It does appear he is at risk for fatty liver, and if so I would want to resume his GLP  1 inhibitor therapy.  Will discuss this versus proceeding to an ultrasound at his return to clinic.      Return to clinic 6 months.         51 minutes spent on the date of the encounter doing chart review, history and exam, documentation, education and counseling, as well as communication and coordination of care, and further activities as noted above.  This time excludes time spent reviewing CGM.  It is my privilege to be involved in the care of the above patient.     Marisabel Prieto PA-C, [facility]  [facility]  Diabetes, Endocrinology, and Metabolism  [phone] Appointments/Nurse Line  [phone] Fax  [phone] pager  On [facility] (inpatient)                                          Again, thank you for allowing me to participate in the care of your patient.      Sincerely,    Marisabel Prieto PA-C

## 2024-09-17 NOTE — TELEPHONE ENCOUNTER
Extended Golytely Bowel Prep  recommended due to poor prep/inadequate bowel prep in the past.  Instructions were sent via letter and [employer]. Bowel prep was sent 9/17/2024 to    [facility] - [place], [place] - [address] RN Colorectal Cancer   Division of Gastroenterology at [facility]

## 2024-09-24 ENCOUNTER — TELEPHONE (OUTPATIENT)
Dept: GASTROENTEROLOGY | Facility: CLINIC | Age: 69
End: 2024-09-24
Payer: COMMERCIAL

## 2024-09-24 NOTE — TELEPHONE ENCOUNTER
Pre visit planning completed.      Procedure details:    Patient scheduled for Colonoscopy/Upper endoscopy (EGD) on 10/8/2024.     Arrival time: 0900. Procedure time 1030    Facility location: [facility]; [address] [place] [address], [address]rd Floor, [place] [address]. Check in location: Main entrance at registration desk.    Sedation type: MAC    Pre op exam needed? Yes. PAC eval scheduled on 9/27 due to pulmonary hypertension.    Indication for procedure: anemia       Chart review:     Electronic implanted devices? Yes, ICD    Recent diagnosis of diverticulitis within the last 6 weeks? No      Medication review:    Diabetic? Yes. Diabetic medication HOLDING recommendations: Oral diabetic medications: Jardiance (empagliflozin): HOLD  3 days before procedure.  Diabetic injectables: Trulicity (Dulaglutide).  Weekly dosing of medication.  HOLD 7 days before procedure.  Follow up with managing provider.   Insulin: Consult with managing provider.     Anticoagulants? No    Weight loss medication/injectable? No.    Other medication HOLDING recommendations:  Ferrous sulfate (iron supplements): HOLD 7 days before procedure.      Prep for procedure:     Bowel prep recommendation: Extended Golytely. Bowel prep prescription sent to      [facility] - [place], [place] - [address]    Due to: poor prep/inadequate bowel prep in the past.     Prep instructions sent via letter         Briana Cabezas RN  Endoscopy Procedure Pre Assessment RN  [phone] option 2

## 2024-09-24 NOTE — TELEPHONE ENCOUNTER
Pre assessment completed for upcoming procedure.   (Please see previous telephone encounter notes for complete details)    Patient  returned call.      ID [id number]    Procedure details:    Arrival time and facility location reviewed.    Pre op exam needed?  Yes. PAC eval scheduled on 9/27 due to pulmonary hypertension.    Designated  policy reviewed. Instructed to have someone stay 24  hours post procedure.       Medication review:    Medications reviewed. Please see supporting documentation below. Holding recommendations discussed (if applicable).       Prep for procedure:     Procedure prep instructions reviewed.        Any additional information needed:  Patient reports he does not take Trulicity and doesn't have a ICD      Patient  verbalized understanding and had no questions or concerns at this time.      Gabrielle Woo RN  Endoscopy Procedure Pre Assessment   [phone] option 2

## 2024-09-24 NOTE — TELEPHONE ENCOUNTER
Attempted to contact patient via  ID # [id number] in order to complete pre assessment questions.     Writer called daughter Elisabeth, C2C on file, and advised that pt has procedure coming up and there are some HOLD times on his DM meds. Elisabeth is at work and will call her mom to relay message to call us back to complete pre-assessment.    No answer. Left message to return call to [phone] option 2.    Callback required communication sent via voicemail due to [employer] inactive.      Briana Cabezas RN  Endoscopy Procedure Pre Assessment

## 2024-09-27 ENCOUNTER — PRE VISIT (OUTPATIENT)
Dept: SURGERY | Facility: CLINIC | Age: 69
End: 2024-09-27

## 2024-09-27 ENCOUNTER — ANESTHESIA EVENT (OUTPATIENT)
Dept: GASTROENTEROLOGY | Facility: CLINIC | Age: 69
End: 2024-09-27
Payer: COMMERCIAL

## 2024-09-27 ENCOUNTER — OFFICE VISIT (OUTPATIENT)
Dept: SURGERY | Facility: CLINIC | Age: 69
End: 2024-09-27
Payer: COMMERCIAL

## 2024-09-27 VITALS
SYSTOLIC BLOOD PRESSURE: 124 MMHG | HEIGHT: 65 IN | OXYGEN SATURATION: 99 % | HEART RATE: 87 BPM | WEIGHT: 184 LBS | DIASTOLIC BLOOD PRESSURE: 69 MMHG | TEMPERATURE: 98.5 F | BODY MASS INDEX: 30.66 KG/M2 | RESPIRATION RATE: 16 BRPM

## 2024-09-27 DIAGNOSIS — D50.9 IRON DEFICIENCY ANEMIA, UNSPECIFIED IRON DEFICIENCY ANEMIA TYPE: ICD-10-CM

## 2024-09-27 DIAGNOSIS — Z01.818 PRE-OPERATIVE EXAMINATION: Primary | ICD-10-CM

## 2024-09-27 PROCEDURE — T1013 SIGN LANG/ORAL INTERPRETER: HCPCS | Mod: GT

## 2024-09-27 PROCEDURE — 99215 OFFICE O/P EST HI 40 MIN: CPT | Performed by: PHYSICIAN ASSISTANT

## 2024-09-27 ASSESSMENT — LIFESTYLE VARIABLES: TOBACCO_USE: 0

## 2024-09-27 ASSESSMENT — ENCOUNTER SYMPTOMS
ORTHOPNEA: 0
SEIZURES: 0

## 2024-09-27 ASSESSMENT — COPD QUESTIONNAIRES: COPD: 0

## 2024-09-27 ASSESSMENT — PAIN SCALES - GENERAL: PAINLEVEL: NO PAIN (0)

## 2024-09-27 NOTE — H&P
Pre-Operative H & P     CC:  Preoperative exam to assess for increased cardiopulmonary risk while undergoing surgery and anesthesia.    Date of Encounter: 9/27/2024  Primary Care Physician:  Fracisco Casiano     Reason for visit:   Encounter Diagnoses   Name Primary?    Pre-operative examination Yes    Iron deficiency anemia, unspecified iron deficiency anemia type        HPI  Lucian Oliveira is a 68 year old male who presents for pre-operative H & P in preparation for  Procedure Information       Case: [id number] Date/Time: 10/08/24 1030    Procedures:       Colonoscopy (Anus)      Esophagoscopy, gastroscopy, duodenoscopy (EGD), combined (Esophagus)    Anesthesia type: MAC    Diagnosis:       Iron deficiency anemia, unspecified iron deficiency anemia type [D50.9]      Anemia due to folic acid deficiency, unspecified deficiency type [D52.9]      Heart replaced by transplant (H) [Z94.1]    Pre-op diagnosis:       Iron deficiency anemia, unspecified iron deficiency anemia type [D50.9]      Anemia due to folic acid deficiency, unspecified deficiency type [D52.9]      Heart replaced by transplant (H) [Z94.1]    Location:  [facility] /  GI    Providers: Rip Bryan MD            The patient is a 68 year old man who has a past medical history significant for CAD s/p CABG in 2008, ICM and s/p orthotopic heart transplant in 7/2020, HTN, HLD, mild pulmonary HTN, CHANTEL, retinopathy, history of retinal detachment, history of upper extremity DVT, History of HIT, anemia, obesity, type 2 diabetes, and CKD stage 3. The patient was recommended to have a colonoscopy and EGD given that his colonoscopy last year was incomplete and he continues to have anemia. He is now scheduled for the procedure as above.     History is obtained from the patient and chart review    Hx of abnormal bleeding or anti-platelet use: ASA 81 mg       Past Medical History  Past Medical History:   Diagnosis Date    CAD (coronary artery disease)     CHF  (congestive heart failure) (H)     CKD (chronic kidney disease), stage III (H)     Cortical cataract of both eyes     Diabetes (H)     Hyperlipidemia     Hypertension     Infection due to Streptococcus mitis group 09/23/2020    Ischemic cardiomyopathy     Obesity     CHANTEL (obstructive sleep apnea)     occas cpap    CHANTEL (obstructive sleep apnea)- severe (AHI 30)     Osteoarthritis        Past Surgical History  Past Surgical History:   Procedure Laterality Date    CATARACT IOL, RT/LT      COLONOSCOPY N/A 8/7/2019    Procedure: COLONOSCOPY, WITH POLYPECTOMY AND BIOPSY;  Surgeon: Chauncey Morataya MD;  Location:  GI    COLONOSCOPY N/A 5/12/2023    Procedure: Colonoscopy;  Surgeon: Mraiano Velasquez MD;  Location:  GI    CV ANGIOGRAM CORONARY GRAFT N/A 7/2/2020    Procedure: Angiogram Coronary Graft;  Surgeon: Alex Lantigua MD;  Location:  HEART CARDIAC CATH LAB    CV CORONARY ANGIOGRAM N/A 7/2/2020    Procedure: CV CORONARY ANGIOGRAM;  Surgeon: Alex Lantigua MD;  Location:  HEART CARDIAC CATH LAB    CV CORONARY ANGIOGRAM N/A 7/20/2021    Procedure: CV CORONARY ANGIOGRAM;  Surgeon: Raimundo Hudson MD;  Location:  HEART CARDIAC CATH LAB    CV CORONARY ANGIOGRAM N/A 9/12/2022    Procedure: Coronary Angiogram- BIPLANE;  Surgeon: Raimundo Hudson MD;  Location:  HEART CARDIAC CATH LAB    CV CORONARY ANGIOGRAM N/A 7/17/2023    Procedure: Coronary Angiogram;  Surgeon: Alex Lantigua MD;  Location:  HEART CARDIAC CATH LAB    CV HEART BIOPSY N/A 7/27/2020    Procedure: Heart Biopsy;  Surgeon: Mario Burr MD;  Location:  HEART CARDIAC CATH LAB    CV HEART BIOPSY N/A 8/3/2020    Procedure: CV HEART BIOPSY;  Surgeon: Alex Lantigua MD;  Location:  HEART CARDIAC CATH LAB    CV HEART BIOPSY N/A 8/10/2020    Procedure: CV HEART BIOPSY;  Surgeon: Mario Burr MD;  Location:  HEART CARDIAC CATH LAB    CV HEART BIOPSY N/A  8/17/2020    Procedure: CV HEART BIOPSY;  Surgeon: Raimundo Hudson MD;  [facility]    CV HEART BIOPSY N/A 8/31/2020    Procedure: CV HEART BIOPSY;  Surgeon: Moises Santos MD;  Location: [facility]    CV HEART BIOPSY N/A 9/14/2020    Procedure: CV HEART BIOPSY;  Surgeon: Raimundo Hudson MD;  Location: [facility]    CV HEART BIOPSY N/A 9/28/2020    Procedure: CV HEART BIOPSY;  Surgeon: Raimundo Hudson MD;  Location: [facility]    CV HEART BIOPSY N/A 10/12/2020    Procedure: CV HEART BIOPSY;  Surgeon: John Stuart MD;  Location: [facility]    CV HEART BIOPSY N/A 11/10/2020    Procedure: CV HEART BIOPSY;  Surgeon: John Stuart MD;  Location:  HEART CARDIAC CATH LAB    CV HEART BIOPSY N/A 12/7/2020    Procedure: CV HEART BIOPSY;  Surgeon: Raimundo Hudson MD;  Location: [facility]    CV HEART BIOPSY N/A 1/5/2021    Procedure: CV HEART BIOPSY;  Surgeon: Raimundo Hudson MD;  Location: [facility]    CV HEART BIOPSY N/A 7/20/2021    Procedure: CV HEART BIOPSY;  Surgeon: Raimundo Hudson MD;  Location:  HEART CARDIAC CATH LAB    CV HEART BIOPSY N/A 9/28/2021    Procedure: CV HEART BIOPSY;  Surgeon: Raimundo Hudson MD;  Location: [facility]    CV HEART BIOPSY N/A 9/12/2022    Procedure: Heart Biopsy;  Surgeon: Raimundo Hudson MD;  Location:  HEART CARDIAC CATH LAB    CV HEART BIOPSY N/A 7/17/2023    Procedure: Heart Biopsy;  Surgeon: Alex Lantigua MD;  Location:  HEART CARDIAC CATH LAB    CV INTRA AORTIC BALLOON N/A 7/14/2020    Procedure: RHC WITH LEAVE IN SWAN/IABP;  Surgeon: Sonny Saleh MD;  Location:  HEART CARDIAC CATH LAB    CV INTRAVASULAR ULTRASOUND N/A 9/12/2022    Procedure: Intravascular Ultrasound;  Surgeon: Raimundo Hudson  MD Dayron;  Location: [facility]    CV RIGHT HEART CATH MEASUREMENTS RECORDED N/A 3/25/2019    Procedure: CV RIGHT HEART CATH;  Surgeon: Moises Santos MD;  Location: [facility]    CV RIGHT HEART CATH MEASUREMENTS RECORDED N/A 7/10/2019    Procedure: CV RIGHT HEART CATH;  Surgeon: Jak Mccabe MD;  Location: [facility]    CV RIGHT HEART CATH MEASUREMENTS RECORDED N/A 7/8/2020    Procedure: Right Heart Cath with Leave In Velma already has ICU load;  Surgeon: Jak Mccabe MD;  Location: [facility]    CV RIGHT HEART CATH MEASUREMENTS RECORDED N/A 7/14/2020    Procedure: CV RIGHT HEART CATH;  Surgeon: Sonny Saleh MD;  Location:  HEART CARDIAC CATH LAB    CV RIGHT HEART CATH MEASUREMENTS RECORDED N/A 7/2/2020    Procedure: CV RIGHT HEART CATH;  Surgeon: Alex Lantigua MD;  Location:  HEART CARDIAC CATH LAB    CV RIGHT HEART CATH MEASUREMENTS RECORDED N/A 7/27/2020    Procedure: Right Heart Cath;  Surgeon: Mario Burr MD;  [facility]    CV RIGHT HEART CATH MEASUREMENTS RECORDED N/A 8/3/2020    Procedure: Right Heart Cath;  Surgeon: Alex Lantigua MD;  Location:  HEART CARDIAC CATH LAB    CV RIGHT HEART CATH MEASUREMENTS RECORDED N/A 8/10/2020    Procedure: CV RIGHT HEART CATH;  Surgeon: Mario Brur MD;  Location:  HEART CARDIAC CATH LAB    CV RIGHT HEART CATH MEASUREMENTS RECORDED N/A 8/17/2020    Procedure: CV RIGHT HEART CATH;  Surgeon: Raimundo Hudson MD;  [facility]    CV RIGHT HEART CATH MEASUREMENTS RECORDED N/A 8/31/2020    Procedure: CV RIGHT HEART CATH;  Surgeon: Moises Santos MD;  Location: [facility]    CV RIGHT HEART CATH MEASUREMENTS RECORDED N/A 9/14/2020    Procedure: CV RIGHT HEART CATH;  Surgeon: Raimundo Hudson MD;  Location: [facility]    CV RIGHT HEART CATH MEASUREMENTS  RECORDED N/A 9/28/2020    Procedure: CV RIGHT HEART CATH;  Surgeon: Raimundo Hudson MD;  Location:  HEART CARDIAC CATH LAB    CV RIGHT HEART CATH MEASUREMENTS RECORDED N/A 10/12/2020    Procedure: CV RIGHT HEART CATH;  Surgeon: John Stuart MD;  Location:  HEART CARDIAC CATH LAB    CV RIGHT HEART CATH MEASUREMENTS RECORDED N/A 11/10/2020    Procedure: CV RIGHT HEART CATH;  Surgeon: John Stuart MD;  Location:  HEART CARDIAC CATH LAB    CV RIGHT HEART CATH MEASUREMENTS RECORDED N/A 12/7/2020    Procedure: CV RIGHT HEART CATH;  Surgeon: Raimundo Hudson MD;  Location:  HEART CARDIAC CATH LAB    CV RIGHT HEART CATH MEASUREMENTS RECORDED N/A 1/5/2021    Procedure: CV RIGHT HEART CATH;  Surgeon: Raimundo Hudson MD;  Location:  HEART CARDIAC CATH LAB    CV RIGHT HEART CATH MEASUREMENTS RECORDED N/A 7/20/2021    Procedure: CV RIGHT HEART CATH;  Surgeon: Raimundo Hudson MD;  Location:  HEART CARDIAC CATH LAB    CV RIGHT HEART CATH MEASUREMENTS RECORDED N/A 9/28/2021    Procedure: CV RIGHT HEART CATH;  Surgeon: Raimundo Hudson MD;  Location:  HEART CARDIAC CATH LAB    CV RIGHT HEART CATH MEASUREMENTS RECORDED N/A 9/12/2022    Procedure: Right Heart Catheterization;  Surgeon: Raimundo Hudson MD;  Location:  HEART CARDIAC CATH LAB    CV RIGHT HEART CATH MEASUREMENTS RECORDED N/A 7/17/2023    Procedure: Right Heart Catheterization;  Surgeon: Alex Lantigua MD;  Location: [facility] CARDIAC CATH LAB    EXTRACTION(S) DENTAL Left 7/13/2020    Procedure: EXTRACTION, TOOTH #11, 12, 13, 15, and 29;  Surgeon: Monica Chao DDS;  Location:  OR    IMPLANT AUTOMATIC IMPLANTABLE CARDIOVERTER DEFIBRILLATOR      INCISION AND DRAINAGE STERNUM W/ IRRIGATION SYSTEM, COMBINED N/A 9/23/2020    Procedure: INCISION AND DRAINAGE, LEFT SUPRACLAVICULAR WOUND INFECTION AND ABDOMENN WOUND.;  Surgeon: Ran Huertas MD;  [facility]    INSERT INTRAAORTIC BALLOON PUMP N/A 7/16/2020    Procedure: Subclavian Intra-Aortic Balloon Pump Placement;  Surgeon: Allan Sparrow MD;  [facility]    IRRIGATION AND DEBRIDEMENT CHEST WASHOUT, COMBINED N/A 9/28/2020    Procedure: IRRIGATION AND DEBRIDEMENT OF LEFT UPPER CHEST WOUND.;  Surgeon: Ran Huertas MD;  [facility]    PHACOEMULSIFICATION CLEAR CORNEA WITH STANDARD INTRAOCULAR LENS IMPLANT Right 2/6/2023    Procedure: RIGHT EYE PHACOEMULSIFICATION, CATARACT, WITH INTRAOCULAR LENS IMPLANT with trypan blue;  Surgeon: Triny Bunch MD;  Location: [facility]    PHACOEMULSIFICATION CLEAR CORNEA WITH STANDARD IOL, VITRECTOMY PARSPLANA 25 GAGUE, COMBINED Left 12/10/2019    Procedure: PHACOEMULSIFICATION, CATARACT, CLEAR CORNEAL INCISION APPROACH, W STD INTRAOCULAR LENS IMPLANT INSERT + VITRECTOMY BY PARS PLANA  USING 25-GAUGE INSTRUMENTS. ENDOLASER, INFUSION OF 20% SF6 GAS;  Surgeon: Triny Bunch MD;  [facility]    PICC DOUBLE LUMEN PLACEMENT Left 07/28/2020    5Fr - 42cm, Basilic vein, mid SVC    PICC INSERTION Right 07/11/2020    basilic 44 cm total     TRANSPLANT HEART RECIPIENT N/A 7/19/2020    Procedure: REDO MEDIAN STERNOTOMY, TRANSPLANT, ORTHOTOPIC HEART, RECIPIENT, ON PUMP OXYGENATOR, REMOVAL OF CARDIAC DEFIBRILLATOR AND LEAD;  Surgeon: Griselli, Massimo, MD;  [facility]    VITRECTOMY, PARS PLANA APPROACH, USING 27-GAUGE INSTRUMENTS Left 12/21/2020    Procedure: 27 gauge pars plana vitectomy, membrane peel, perfluoroctane liquid (PFO), retinectomy, endolaser, Silicone Oil 1000 cs;  Surgeon: Triny Bunch MD;  Location: [facility]    ZZC CABG, ARTERY-VEIN, THREE  02/2008       Prior to Admission Medications  Current Outpatient Medications   Medication Sig Dispense Refill    acetaminophen (TYLENOL) 325 MG tablet Take 3 tablets (975 mg) by mouth every 8 hours as needed for mild pain 60 tablet 3    aspirin (ASA) 81 MG chewable tablet Take 1 tablet (81 mg) by  mouth daily (Patient taking differently: Take 81 mg by mouth every morning.) 120 tablet 0    calcium carbonate-vitamin D (CALTRATE) 600-10 MG-MCG per tablet TAKE ONE TABLET BY MOUTH TWICE A DAY WITH MEALS 180 tablet 3    empagliflozin (JARDIANCE) 10 MG TABS tablet Take 1 tablet (10 mg) by mouth daily. (Patient taking differently: Take 10 mg by mouth every morning.) 90 tablet 0    ferrous sulfate (FEROSUL) 325 (65 Fe) MG tablet Take 1 tablet (325 mg) by mouth 2 times daily (with meals) (Patient taking differently: Take 325 mg by mouth daily (with breakfast).) 180 tablet 3    furosemide (LASIX) 20 MG tablet TAKE 1 TABLET(20 MG) BY MOUTH DAILY (Patient taking differently: Take 20 mg by mouth every morning.) 90 tablet 3    HUMALOG KWIKPEN 100 UNIT/ML soln Give 10 units with breakfast, 10 units with lunch,  and 5 units with dinner.  Average daily use is 25 units 90 mL 3    insulin degludec (TRESIBA) 200 UNIT/ML pen Inject 60 Units Subcutaneous every morning Order pen needles too 90 mL 11    ketorolac (ACULAR) 0.5 % ophthalmic solution Place 1 drop into the right eye 2 times daily 10 mL 3    lisinopril (ZESTRIL) 5 MG tablet Take 1 tablet (5 mg) by mouth daily (Patient taking differently: Take 5 mg by mouth every morning.) 90 tablet 3    magnesium oxide 400 MG tablet Take 1 tablet (400 mg) by mouth 2 times daily 180 tablet 3    mycophenolate (GENERIC EQUIVALENT) 500 MG tablet Take 3 tablets (1,500 mg) by mouth 2 times daily 180 tablet 11    omega-3 acid ethyl esters (LOVAZA) 1 g capsule TOME 1 CAPSULA POR LA BOCA CADA LISA (Patient taking differently: Take 1 g by mouth every morning.) 100 capsule 2    rosuvastatin (CRESTOR) 20 MG tablet Take 1 tablet (20 mg) by mouth daily (Patient taking differently: Take 20 mg by mouth every morning.) 90 tablet 3    senna-docusate (SENOKOT-S/PERICOLACE) 8.6-50 MG tablet Take 1 tablet by mouth 2 times daily as needed (hold for loose stools) 60 tablet 3    sirolimus (GENERIC EQUIVALENT) 0.5  MG tablet Take 6 tablets (3 mg) by mouth daily (Patient taking differently: Take 3 mg by mouth daily (with lunch).) 180 tablet 11    sulfamethoxazole-trimethoprim (BACTRIM) 400-80 MG tablet Take 1 tablet by mouth three times a week (Patient taking differently: Take 1 tablet by mouth three times a week. Monday. Wednesday & Friday's) 12 tablet 0    tamsulosin (FLOMAX) 0.4 MG capsule TAKE ONE CAPSULE BY MOUTH ONCE DAILY (Patient taking differently: Take 0.4 mg by mouth every morning.) 90 capsule 3    Alcohol Swabs PADS Use to swab the area of the injection or sergio as directed   Per insurance coverage 100 each 0    bisacodyl (DULCOLAX) 5 MG EC tablet Two days prior to exam take two (2) tablets at 4pm. One day prior to exam take two (2) tablets at 4pm 4 tablet 0    blood glucose (NO BRAND SPECIFIED) test strip Use to test blood sugar 4 times daily or as directed. 400 strip 3    blood glucose monitoring (ACCU-CHEK FELIPE SMARTVIEW) meter device kit Use to test blood sugar 3-4 times daily, as directed. 1 kit 0    blood glucose monitoring (SOFTCLIX) lancets 1 each 4 times daily Use to test blood sugars 2 times daily. 400 each 8    Continuous Glucose Sensor (DEXCOM G7 SENSOR) [facility] CADA 10 MCINTYRE 10 each 2    Injection Device for insulin (CEQUR SIMPLICITY 2U) KALI 1 each See Admin Instructions. Change patch every 2-3 days 40 each 4    Injection Device for Insulin (CEQUR SIMPLICITY ) MISC 1 each See Admin Instructions. Use with patches. Change patch every 2-3 days 1 each 1    insulin pen needle (32G X 4 MM) 32G X 4 MM miscellaneous Use 5-6 pen needles daily or as directed. 600 each 2    polyethylene glycol (GOLYTELY) 236 g suspension Two days before procedure at 5PM fill first container with water. Mix and drink an 8 oz glass every 15 minutes until HALF of the container is gone. Place the remainder in the refrigerator. One day before procedure at 5PM drink second half of bowel prep. Drink an 8 oz glass every 15  minutes until it is gone. Day of procedure 6 hours before arrival time fill the 2nd container with water. Mix and drink an 8 oz glass every 15 minutes until HALF of the container is gone. Discard the remaining solution. 8000 mL 0       Allergies  Allergies   Allergen Reactions    Heparin Heparin Induced Thrombocytopenia     HIT screen sent 7/18/2020. CORRINE confirmed positive       Social History  Social History     Socioeconomic History    Marital status:      Spouse name: Not on file    Number of children: 4    Years of education: Not on file    Highest education level: Not on file   Occupational History    Occupation:      Employer: [employer]     Employer: RETIRED   Tobacco Use    Smoking status: Never    Smokeless tobacco: Never    Tobacco comments:     Never smoked; non-smoking household   Vaping Use    Vaping status: Never Used   Substance and Sexual Activity    Alcohol use: No     Alcohol/week: 0.0 standard drinks of alcohol    Drug use: No    Sexual activity: Yes     Partners: Female   Other Topics Concern    Parent/sibling w/ CABG, MI or angioplasty before 65F 55M? No   Social History Narrative    Not on file     Social Determinants of Health     Financial Resource Strain: Not on file   Food Insecurity: Not on file   Transportation Needs: Not on file   Physical Activity: Not on file   Stress: Not on file   Social Connections: Not on file   Interpersonal Safety: Not on file   Housing Stability: Not on file       Family History  Family History   Problem Relation Age of Onset    Diabetes Sister     Diabetes Sister     Diabetes Brother     Macular Degeneration No family hx of     Glaucoma No family hx of     Myocardial Infarction No family hx of     Kidney Disease No family hx of     Anesthesia Reaction No family hx of     Bleeding Disorder No family hx of     Venous thrombosis No family hx of        Review of Systems  The complete review of systems is negative other than noted in the HPI  or here.   Anesthesia Evaluation   Pt has had prior anesthetic. Type: General and MAC.    No history of anesthetic complications       ROS/MED HX  ENT/Pulmonary:     (+) sleep apnea, doesn't use CPAP,   CHANTEL risk factors,  hypertension,                              (-) tobacco use, asthma, COPD and recent URI   Neurologic:  - neg neurologic ROS  (-) no seizures and no CVA   Cardiovascular: Comment: Hx of CAD s/p CABG 2008, now s/p heart transplant (7/2020)    (+) Dyslipidemia hypertension- -  CAD -  CABG-date: 3v CABG 2008. -   Taking blood thinners  Instructions Given to patient: ASA 81 mg  - hold 2 days prior. CHF (history of ICM now s/p orthotopic heart transplant)  Last EF: 55-60 date: 7/22/24                       pulmonary hypertension, Previous cardiac testing   Echo: Date: 3/14/22 Results:  See H&P  Stress Test:  Date: 9/26/22 Results:  See H&P  ECG Reviewed:  Date: 9/26/22 Results:  See H&P  Cath:  Date: 5/12/22 Results:  See H&P (-) ALBRIGHT, orthopnea/PND and irregular heartbeat/palpitations   METS/Exercise Tolerance: >4 METS Comment: Walking on treadmill 30 mins daily flat and with incline without difficulty.   Cycles 20 mins 5-6 x week    Hematologic: Comments: Hx of right-sided deep venous thrombosis in the right internal jugular vein and right axillary vein 2020  Hx of HIT  Denies any blood clots since this time. Not on any blood thinners currently.     (+) History of blood clots,    pt is not anticoagulated, anemia, history of blood transfusion, no previous transfusion reaction,  - 11/17/23,      Musculoskeletal: Comment: Chronic back pain - takes occ. tylenol      GI/Hepatic:  - neg GI/hepatic ROS  (-) GERD, inflamatory bowel disease and liver disease   Renal/Genitourinary:     (+) renal disease, type: CRI, Pt does not require dialysis,     BPH,      Endo:     (+)  type II DM, Last HgA1c: 7.3, date: 7/22/24, Using insulin, - not using insulin pump. Normal glucose range: averages 110 in AM, 170-180 in  "evenings, not previously admitted for DM/DKA. Diabetic complications: nephropathy retinopathy.      Obesity,    (-) thyroid disease and chronic steroid usage   Psychiatric/Substance Use:  - neg psychiatric ROS     Infectious Disease:  - neg infectious disease ROS  (-) Recent Fever   Malignancy:  - neg malignancy ROS     Other:  - neg other ROS    (+)  , H/O Chronic Pain,         /69 (BP Location: Right arm, Patient Position: Sitting, Cuff Size: Adult Large)   Pulse 87   Temp 98.5  F (36.9  C) (Oral)   Resp 16   Ht 1.651 m (5' 5\")   Wt 83.5 kg (184 lb)   SpO2 99%   BMI 30.62 kg/m      Physical Exam   Constitutional: Awake, alert, cooperative, no apparent distress, and appears stated age.  Eyes: Pupils equal, round and reactive to light, extra ocular muscles intact, sclera clear, conjunctiva normal.  HENT: Normocephalic, oral pharynx with moist mucus membranes, good dentition. No goiter appreciated.   Respiratory: Clear to auscultation bilaterally, no crackles or wheezing.  Cardiovascular: Regular rate and rhythm, normal S1 and S2, and no murmur noted.  Carotids +2, no bruits. Mild lower extremity edema. Palpable pulses to radial  DP and PT arteries.   GI: Normal bowel sounds, soft, non-distended, non-tender, no masses palpated, no hepatosplenomegaly.  Surgical scars: well healed  Lymph/Hematologic: No cervical lymphadenopathy and no supraclavicular lymphadenopathy.  Genitourinary:  defer   Skin: Warm and dry.  No rashes at anticipated surgical site.   Musculoskeletal: Full ROM of neck. There is no redness, warmth, or swelling of the joints. Gross motor strength is normal.    Neurologic: Awake, alert, oriented to name, place and time. Cranial nerves II-XII are grossly intact. Gait is normal.   Neuropsychiatric: Calm, cooperative. Normal affect.     Prior Labs/Diagnostic Studies   All labs and imaging personally reviewed    Latest Reference Range & Units 07/22/24 07:11   Sodium 135 - 145 mmol/L 141 "   Potassium 3.4 - 5.3 mmol/L 3.6   Chloride 98 - 107 mmol/L 105   Carbon Dioxide (CO2) 22 - 29 mmol/L 26   Urea Nitrogen 8.0 - 23.0 mg/dL 34.8 (H)   Creatinine 0.67 - 1.17 mg/dL 2.25 (H)   GFR Estimate >60 mL/min/1.73m2 31 (L)   Calcium 8.8 - 10.4 mg/dL 8.9   Anion Gap 7 - 15 mmol/L 10   Magnesium 1.7 - 2.3 mg/dL 2.2   Phosphorus 2.5 - 4.5 mg/dL 3.1   Albumin 3.5 - 5.2 g/dL 3.8   Protein Total 6.4 - 8.3 g/dL 6.6   Alkaline Phosphatase 40 - 150 U/L 119   ALT 0 - 70 U/L 8   AST 0 - 45 U/L 15   Bilirubin Total <=1.2 mg/dL 0.3   Cholesterol <200 mg/dL 133   CK Total 39 - 308 U/L 118   Patient Fasting?  Yes  Yes   Ferritin 31 - 409 ng/mL 278   Glucose 70 - 99 mg/dL 99   HDL Cholesterol >=40 mg/dL 31 (L)   Hemoglobin A1C <5.7 % 7.3 (H)   Iron 61 - 157 ug/dL 52 (L)   Iron Binding Capacity 240 - 430 ug/dL 200 (L)   Iron Sat Index 15 - 46 % 26   LDL Cholesterol Calculated <=100 mg/dL 49   Non HDL Cholesterol <130 mg/dL 102   Prostate Specific Antigen Screen 0.00 - 6.50 ng/mL 0.32   Triglycerides <150 mg/dL 263 (H)   Vitamin B12 232 - 1,245 pg/mL 278   WBC 4.0 - 11.0 10e3/uL 6.6   Hemoglobin 13.3 - 17.7 g/dL 10.7 (L)   Hematocrit 40.0 - 53.0 % 33.8 (L)   Platelet Count 150 - 450 10e3/uL 219   RBC Count 4.40 - 5.90 10e6/uL 3.71 (L)   MCV 78 - 100 fL 91   MCH 26.5 - 33.0 pg 28.8   MCHC 31.5 - 36.5 g/dL 31.7   RDW 10.0 - 15.0 % 14.0   % Neutrophils % 70   % Lymphocytes % 20   % Monocytes % 8   % Eosinophils % 1   % Basophils % 0   Absolute Basophils 0.0 - 0.2 10e3/uL 0.0   Absolute Eosinophils 0.0 - 0.7 10e3/uL 0.1   Absolute Immature Granulocytes <=0.4 10e3/uL 0.0   Absolute Lymphocytes 0.8 - 5.3 10e3/uL 1.3   Absolute Monocytes 0.0 - 1.3 10e3/uL 0.5   % Immature Granulocytes % 1   Absolute Neutrophils 1.6 - 8.3 10e3/uL 4.7   Absolute NRBCs 10e3/uL 0.0   NRBCs per 100 WBC <1 /100 0   (H): Data is abnormally high  (L): Data is abnormally low    DSE 7/30/24  Interpretation Summary  Normal, low-risk dobutamine echocardiogram  without evidence of ischemia. The  target heart rate was achieved.  Normal biventricular size, thickness, and global systolic function at  baseline, LVEF=55-60%.  With low-dose dobutamine, LVEF augmented and LV cavity size decreased  appropriately.  With peak dobutamine, LVEF increased further to >70% and LV cavity size  decreased appropriately.  No regional wall motion abnormalities at rest or with dobutamine.  No angina was elicited.  No ECG evidence of ischemia.  Normal heart rate and blood pressure response to dobutamine.  No significant valvular abnormalities are noted on screening Doppler exam.  The aortic root and visualized ascending aorta are normal.      Echo 7/22/24  Interpretation Summary  Global and regional left ventricular function is normal with an EF of 55-60%.  Right ventricular function, chamber size, wall motion, and thickness are  normal.  Both atria appear normal.  No significant valvular abnormalities present.  The aortic root and visualized thoracic aorta are normal.  The inferior vena cava is normal.  No pericardial effusion is present.     Compared to prior TTE 7/17/23, no significant changes noted.      EKG 11/16/23  Sinus rhythm with short [place]   Inferior infarct , age undetermined   Cannot rule out Anterior infarct , age undetermined   Abnormal ECG       Coronary angiogram/RHC 7/17/23    Conclusion         Right sided filling pressures are mildly elevated.    Left sided filling pressures are mildly elevated.    Mild elevated pulmonary hypertension.    Normal cardiac output level.    Successful endomyocardial biopsy. Results pending.    Fluoroscopy was used to visualize the right femoral vein.     Elevated right and left sided filling pressures with mild PH and preserved CO.  Right heart biopsy  No angiographically significant CAD with similar findings as compared to prior angiography           Latest Ref Rng & Units 7/9/2019     7:22 AM   PFT   FVC L 2.25    FEV1 L 1.85    FVC% % 68     FEV1% % 71          The patient's records and results personally reviewed by this provider.     Outside records reviewed from: Care Everywhere      Assessment    Lucian Oliveira is a 68 year old male seen as a PAC referral for risk assessment and optimization for anesthesia.    Plan/Recommendations  Pt will be optimized for the proposed procedure.  See below for details on the assessment, risk, and preoperative recommendations    NEUROLOGY  - No history of TIA, CVA or seizure  -Post Op delirium risk factors:  High co-morbid index    ENT  - No current airway concerns.  Will need to be reassessed day of surgery.  Mallampati: II  TM: > 3  ~ History of retinopathy and history of retinal detachment - followed by Dr. Bunch. 8/29/24    CARDIAC  - CAD  Well controlled on home regimen - s/p 3V CABG in 2008  - CHF - history of ischemic cardiomyopathy. S/p orthotopic heart transplant 7/2020. Hold lasix DOS given plan for bowel prep  - Hypertension  Well controlled  - Hyperlipidemia  Well controlled on home regimen  - mild pulmonary HTN - seen on 2023 cath. On 2024 echo normal RA pressure of 3 mmHg  The patient is followed by Dr. Sheridan and last seen on 7/22/24. He had recent cardiac testing in routine follow up without significant findings. The patient is active without symptoms. No further testing indicated. The patient will return to cardiology in 1 years time.   - METS (Metabolic Equivalents)  Patient performs 4 or more METS exercise without symptoms             Total Score: 0      RCRI-Moderate risk: Class 3  6.6% complication rate             Total Score: 2    RCRI: Diabetes    RCRI: Elevated Creatinine        PULMONARY  - Obstructive Sleep Apnea - the patient reports he hasn't used a CPAP for multiple years. He and his wife deny any sleep apnea symptoms.   CHANTEL Medium Risk             Total Score: 3    CHANTEL: Hypertension    CHANTEL: Over 50 ys old    CHANTEL: Male      - Denies asthma or inhaler use  - Tobacco  "History    History   Smoking Status    Never   Smokeless Tobacco    Never       GI  PONV Low Risk  Total Score: 1           1 AN PONV: Patient is not a current smoker        /RENAL  - Baseline Creatinine  2.2 - CKD stage 3. Followed by Dr. Huffman and last seen on 1/15/24. Consideration for close monitoring of fluid status and avoid nephrotoxins.     ENDOCRINE    - BMI: Estimated body mass index is 30.62 kg/m  as calculated from the following:    Height as of this encounter: 1.651 m (5' 5\").    Weight as of this encounter: 83.5 kg (184 lb).  Obesity (BMI >30)  - Diabetes  Hemoglobin A1C (%)   Date Value   07/22/2024 7.3 (H)   01/14/2021 6.7 (H)     Diabetes Type 2, insulin dependent. Hold morning oral hypoglycemic medications and short acting insulin DOS. Take 80% of last scheduled dose of long-acting insulin prior to procedure.  Recommend close monitoring of the patient's blood glucose levels throughout the perioperative period. Followed by endocrinology and last seen on 4/16/24. He will hold Jardiance for 3 days prior. His blood sugars normally run in the 120-140.     HEME  VTE Medium Risk 1.8%             Total Score: 6    VTE: Greater than 59 yrs old    VTE: Male    VTE: Pt history of VTE      - Platelet disfunction second to Aspirin (Jose G, many others) - will hold the day before and day of procedure.   - Chronic anemia -   Recommend perioperative use of blood conservation techniques intraoperatively and close monitoring for postoperative bleeding.  ~ History of upper extremity DVT and history of HIT s/p Plex in 2020.   ~ The patient had transfusion on 11/17/23.       MSK  ~ Chronic low back pain - consideration for careful positioning to minimize discomfort.     ID  - Continue preventative bactrim.     Different anesthesia methods/types have been discussed with the patient, but they are aware that the final plan will be decided by the assigned anesthesia provider on the date of service.    The patient is " optimized for their procedure.     AVS with information on meds given by nursing staff. The patient's GI team was sent a message to reach out to the patient with  to review prep instructions as per the patient and his wife they do not know how to use their mychart and do no read in English.     No further diagnostic testing indicated.      On the day of service:     Prep time: 23 minutes  Visit time: 20 minutes  Documentation time: 11 minutes  ------------------------------------------  Total time: 54 minutes      Meme Giles PA-C  Preoperative Assessment Center  [facility]  [facility]  Phone: [phone]  Fax: [phone]

## 2024-09-27 NOTE — PATIENT INSTRUCTIONS
Name:  Lucian Oliveira   MRN:  [id number]   :  1955   Today's Date:  2024     GI Lab procedures:    A representative from the GI Lab will contact you regarding arrival date and time.      You were seen today in the PAC Clinic.   (Pre-operative Anesthesia Assessment Center)  [place] [address]   phone [phone]    You had a pre-operative assessment done.    Anesthesia recommendations for medications:    Hold Aspirin for 2 days before procedure.  Hold Multivitamins for 7 days before procedure.   Hold Herbal medications and Supplements for 7 days before procedure. (Lovaza)  Hold Ibuprofen for 2 days before procedure.   Hold Naproxen for 2 days before procedure.     No alcohol or cannabis products for 24 hours before your procedure      Take only 48 units of insulin Degludec (Tresiba) the morning of procedure.    Hold Jardiance for 3 days before procedure    Please hold the following medications the day of procedure:  Calcium + Vitamin D (Caltrate)  Ferosul (Iron)  Furosemide (Lasix)  Magnesium oxide  Short acting insulin Humalog        Please take these medications the day of procedure:  Lisinopril (Zestril)  Mycophenolate  Rovustatin (Crestor)  Tamsulosin (Flomax)            For questions or appointments, call:  [facility]: [phone], option 2.  Monday through Friday, 8 a.m. to 4:30 p.m.  (If it is after hours, please reach out to the clinic or provider that scheduled your appointment)

## 2024-09-27 NOTE — TELEPHONE ENCOUNTER
Received a message from:    Meme Giles PA-C Faust, Gabrielle TOVAR RN; Anita Sutton RN Hello,    I saw this patient today in the PAC and he and his wife noted that he doesn't read in English and doesn't know how to use Urvewt. Are you able to call this patient using a  to review all of his colonoscopy/EGD prep instructions?    Thanks,  Niesha    -----------------------------------------------------------------------------------------------    Called patient again via  ID [id number]    Left message for patient to return call to [phone] option 2    Gabrielle Woo RN  Endoscopy Procedure Pre Assessment RN  [phone] option 2

## 2024-09-30 ENCOUNTER — NURSE TRIAGE (OUTPATIENT)
Dept: NURSING | Facility: CLINIC | Age: 69
End: 2024-09-30
Payer: COMMERCIAL

## 2024-09-30 NOTE — TELEPHONE ENCOUNTER
Incoming call received from  was brought into call, [place] [id number].  Pt stating that he has not received his prep from the pharmacy yet.  Pt was asked to call their pharmacy to make sure they have the right address or if their are issues with the RX.  Pt was also advise to call back if he needs to have the Rx sent to a new pharmacy.  Prep instructions were verbally reviewed with pt and all questions were answered.     No further questions or concerns at this time.      Shivani Wilson RN

## 2024-09-30 NOTE — TELEPHONE ENCOUNTER
"Via :  Pt calling right eye pain, sudden vision changes sees strips, vision loss. Asking for appt.  See note;  Pt states 'One of the veins in my eye popped and now I can not see'    Right eye, noticed yesterday- was walking and saw strips  Asked if he can see object he stated ' alittle bit'  Blurry vision,  Denies  right eye redness, drainage      Pt of   Triny Bunch MD (Physician)  Ophthalmology   High priority note to eye clinic for call back w/ plan.  Pt # [phone]      Reason for Disposition   Blurred vision and new or worsening    Additional Information   Negative: Followed an eye injury   Negative: Eye pain from chemical in the eye   Negative: Eye pain from foreign body in eye   Negative: Has sinus pain or pressure   Negative: SEVERE eye pain   Negative: Complete loss of vision in one or both eyes   Negative: Eyelids are very swollen (shut or almost) and fever   Negative: Eyelid (outer) is very red and fever   Negative: Foreign body sensation ('feels like something is in there') and irrigation didn't help    Answer Assessment - Initial Assessment Questions  1. ONSET: \"When did the pain start?\" (e.g., minutes, hours, days)          Pt states 'One of the veins in my eye popped and now I can not see'    Right eye, noticed yesterday- was walking and saw strips  Asked if he can see object he stated ' alittle bit'  Blurry vision,  Denies  right eye redness, drainage    2. TIMING: \"Does the pain come and go, or has it been constant since it started?\" (e.g., constant, intermittent, fleeting)      Yesterday sudden    3. SEVERITY: \"How bad is the pain?\"   (Scale 1-10; mild, moderate or severe)    - MILD (1-3): doesn't interfere with normal activities     - MODERATE (4-7): interferes with normal activities or awakens from sleep     - SEVERE (8-10): excruciating pain and patient unable to do normal activities    Mild pain- right eye    4. LOCATION: \"Where does it hurt?\"  (e.g., eyelid, " "eye, cheekbone)      no  5. CAUSE: \"What do you think is causing the pain?\"      unsure  6. VISION: \"Do you have blurred vision or changes in your vision?\"       blurry  7. EYE DISCHARGE: \"Is there any discharge (pus) from the eye(s)?\"  If Yes, ask: \"What color is it?\"       no  8. FEVER: \"Do you have a fever?\" If Yes, ask: \"What is it, how was it measured, and when did it start?\"       no  9. OTHER SYMPTOMS: \"Do you have any other symptoms?\" (e.g., headache, nasal discharge, facial rash)      no  10. PREGNANCY: \"Is there any chance you are pregnant?\" \"When was your last menstrual period?\"        no    Protocols used: Eye Pain and Other Symptoms-A-[place]    "

## 2024-09-30 NOTE — TELEPHONE ENCOUNTER
Spoke to pt at 0945  Yesterday pt noticed dark strings in left eye and then vision became overall dark.    H/o Proliferative diabetic retinopathy and vitreous hemorrhage    Reviewed with Retina team and scheduled tomorrow with Dr. Snowden    Pt aware of date/time/location at [facility].    Recommended keeping head elevated, no bending, no exertion today    Pt verbally demonstrated understanding and seemed comfortable with plan.    David Blackwell RN 9:53 AM 09/30/24

## 2024-10-01 ENCOUNTER — OFFICE VISIT (OUTPATIENT)
Dept: OPHTHALMOLOGY | Facility: CLINIC | Age: 69
End: 2024-10-01
Attending: OPHTHALMOLOGY
Payer: COMMERCIAL

## 2024-10-01 ENCOUNTER — PATIENT OUTREACH (OUTPATIENT)
Dept: CARE COORDINATION | Facility: CLINIC | Age: 69
End: 2024-10-01
Payer: COMMERCIAL

## 2024-10-01 DIAGNOSIS — E11.3513 TYPE 2 DIABETES MELLITUS WITH PROLIFERATIVE RETINOPATHY OF BOTH EYES AND MACULAR EDEMA, UNSPECIFIED WHETHER LONG TERM INSULIN USE (H): Primary | ICD-10-CM

## 2024-10-01 DIAGNOSIS — H43.11 VITREOUS HEMORRHAGE OF RIGHT EYE (H): ICD-10-CM

## 2024-10-01 PROCEDURE — G0463 HOSPITAL OUTPT CLINIC VISIT: HCPCS | Performed by: OPHTHALMOLOGY

## 2024-10-01 PROCEDURE — 250N000011 HC RX IP 250 OP 636: Performed by: OPHTHALMOLOGY

## 2024-10-01 PROCEDURE — 67028 INJECTION EYE DRUG: CPT | Mod: RT | Performed by: OPHTHALMOLOGY

## 2024-10-01 PROCEDURE — 99213 OFFICE O/P EST LOW 20 MIN: CPT | Mod: 25 | Performed by: OPHTHALMOLOGY

## 2024-10-01 PROCEDURE — 92134 CPTRZ OPH DX IMG PST SGM RTA: CPT | Performed by: OPHTHALMOLOGY

## 2024-10-01 PROCEDURE — 92250 FUNDUS PHOTOGRAPHY W/I&R: CPT | Performed by: OPHTHALMOLOGY

## 2024-10-01 PROCEDURE — 99207 FUNDUS PHOTOS OU (BOTH EYES): CPT | Mod: 26 | Performed by: OPHTHALMOLOGY

## 2024-10-01 RX ORDER — LATANOPROST 50 UG/ML
1 SOLUTION/ DROPS OPHTHALMIC DAILY
COMMUNITY

## 2024-10-01 RX ADMIN — Medication 1.25 MG: at 15:20

## 2024-10-01 ASSESSMENT — CONF VISUAL FIELD
OS_SUPERIOR_TEMPORAL_RESTRICTION: 1
METHOD: COUNTING FINGERS
OS_SUPERIOR_NASAL_RESTRICTION: 1
OD_SUPERIOR_TEMPORAL_RESTRICTION: 0
OS_INFERIOR_TEMPORAL_RESTRICTION: 1
OD_SUPERIOR_NASAL_RESTRICTION: 0
OD_INFERIOR_TEMPORAL_RESTRICTION: 0
OD_NORMAL: 1
OD_INFERIOR_NASAL_RESTRICTION: 0
OS_INFERIOR_NASAL_RESTRICTION: 1

## 2024-10-01 ASSESSMENT — TONOMETRY
OD_IOP_MMHG: 15
OS_IOP_MMHG: 21
IOP_METHOD: TONOPEN

## 2024-10-01 ASSESSMENT — REFRACTION_WEARINGRX
OD_AXIS: 017
SPECS_TYPE: LAST MR
OS_SPHERE: BALANCE
OD_SPHERE: -0.50
OD_CYLINDER: +1.00
OD_ADD: +2.50

## 2024-10-01 ASSESSMENT — EXTERNAL EXAM - LEFT EYE: OS_EXAM: NORMAL

## 2024-10-01 ASSESSMENT — SLIT LAMP EXAM - LIDS
COMMENTS: NORMAL
COMMENTS: NORMAL

## 2024-10-01 ASSESSMENT — VISUAL ACUITY
OD_CC: 20/60
CORRECTION_TYPE: GLASSES
OD_CC+: -2
OS_SC: HM
METHOD: SNELLEN - LINEAR

## 2024-10-01 ASSESSMENT — CUP TO DISC RATIO: OD_RATIO: 0.25

## 2024-10-01 ASSESSMENT — EXTERNAL EXAM - RIGHT EYE: OD_EXAM: NORMAL

## 2024-10-01 NOTE — PROGRESS NOTES
CC -   PDR and h/o RD    INTERVAL HISTORY - Initial visit with me, seen 8/29/24 by SM, new eye pain and decreased vision OD. 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    Presented to [facility] 2019 with VH OU,     s/p cardiac Txp 7/2020 for severe ischemic cardiomyopathy  CKD, HTn, DM II      PAST OCULAR SURGERY  PPV/MS/retinectomy/SO OS 12/2020  CE/IOL OD 2/2023  PRP fill-in OD  8/2022 & 10/2023      RETINAL IMAGING:  OCT 9/30/24  OD - central DME mild worse, vit opacities worse, PVD, -> 340-> 319->350  OS - chronic ME and temporal atrophy     Fundus photos  Consistent with exam each eye       ASSESSMENT & PLAN    # PDR wtih DM II and DME OD   - s/p PRP last fill-in 10/2023   - given anti-VEGF for VH last injection 1/2023   - quiescent on DFE      # VH right eye    - prior onset 2021 Tx with Avastin, resolved      - new onset 9/29/24   - last Avastin 1/2023     - advise repeat Avastin today 10/1/24   - recheck 1 month with SM     - r/b/a IVT anti-VEGF d/w patient   - infection, blindness, retinal detachment, cataract, bleeding, need for emergency procedures or surgery   - resident or fellow performance of injection   - off-label use of Avastin      # DME OD   - , used ketorolac in past   - anti-VEGF for VH in 2022 last injection 1/2023 Avastin   - worse on 7/2024 but better without Tx 8/2024   - worse today but decr VA likely 2/2 VH   - monitor   - Avastin for VH   - may need injections for DME if worsens      # PDR OS   - s/p PRP in 2019   - s/p PPV in 2020 for funnel RD after LTFU   - Avastin for NVG last 2022     - quiescent    # h/o RD OS   - s/p repair 2020 with oil   - retina flat    # h/o NVI/NVG OS   - s/p Avastin last 2022    # Ocular HTN   - IOP 15/21   - continue latanoprost       # h/o cardiac Txp    RTC 1 month with  GISELLA Tao MD  Resident Physician, PGY-3  Department of Ophthalmology      ATTESTATION     Attending Attestation:     Complete documentation of historical and exam elements from today's encounter can be found in the full encounter summary report (not reduplicated in this progress note).  I personally obtained the chief complaint(s) and history of present illness.  I confirmed and edited as necessary the review of systems, past medical/surgical history, family history, social history, and examination findings as documented by others; and I examined the patient myself.  I personally reviewed the relevant tests, images, and reports as documented above.  I personally reviewed the ophthalmic test(s) associated with this encounter, agree with the interpretation(s) as documented by the resident/fellow, and have edited the corresponding report(s) as necessary.   I formulated and edited as necessary the assessment and plan and discussed the findings and management plan with the patient and family and I was present for the entire procedure performed by the resident/fellow.    Ninfa Snowden MD, PhD  , Vitreoretinal Surgery  Department of Ophthalmology  [facility]

## 2024-10-01 NOTE — NURSING NOTE
Chief Complaints and History of Present Illnesses   Patient presents with    New Patient     New patient with Right eye vision changes.     Chief Complaint(s) and History of Present Illness(es)       New Patient              Laterality: right eye    Onset: 2 days ago    Quality: spots in vision    Pain scale: 0/10    Comments: New patient with Right eye vision changes.              Comments    The patient notes he notices spots in his Right eye vision.    The patient notes some irritation on the outside of the right eye.    The patient uses Latanoprost at bedtime in both eyes.  Lab Results       Component                Value               Date                       A1C                      7.3                 07/22/2024                 A1C                      7.6                 04/16/2024                 A1C                      6.9                 01/16/2024                 A1C                      8.1                 11/07/2023                 A1C                      8.7                 07/17/2023                 A1C                      8.3                 02/02/2023                 A1C                      8.4                 09/12/2022                 A1C                      7.4                 03/14/2022                 A1C                      6.7                 01/14/2021                 A1C                      5.9                 12/07/2020                 A1C                      8.2                 07/03/2020                 A1C                      7.9                 07/08/2019                 A1C                      8.5                 03/11/2019               Carly Bruno, COA, COA 1:10 PM 10/01/2024

## 2024-10-07 NOTE — PROGRESS NOTES
Pre-procedure note:     67 yo M with a [facility] congestive heart failure s/p cardiac transplant in 7/2020, chronic kidney disease, heparin-induced thrombocytopenia, DM II and colon polyps, who was referred for EGD/colonoscopy for evaluation of anemia. Colonoscopy in 8/2019 revealed few TA but was limited by inadequate bowel prep. Repeat colonoscopy in 5/2023 showed no colon mass but was limited by inadequate/poor prep.    Ref: Fracisco Casiano MD

## 2024-10-08 ENCOUNTER — OFFICE VISIT (OUTPATIENT)
Dept: INTERPRETER SERVICES | Facility: CLINIC | Age: 69
End: 2024-10-08
Attending: INTERNAL MEDICINE

## 2024-10-08 ENCOUNTER — ANESTHESIA (OUTPATIENT)
Dept: GASTROENTEROLOGY | Facility: CLINIC | Age: 69
End: 2024-10-08
Payer: COMMERCIAL

## 2024-10-08 ENCOUNTER — HOSPITAL ENCOUNTER (OUTPATIENT)
Facility: CLINIC | Age: 69
Discharge: HOME | End: 2024-10-08
Attending: INTERNAL MEDICINE | Admitting: INTERNAL MEDICINE
Payer: COMMERCIAL

## 2024-10-08 VITALS
DIASTOLIC BLOOD PRESSURE: 64 MMHG | RESPIRATION RATE: 16 BRPM | SYSTOLIC BLOOD PRESSURE: 126 MMHG | OXYGEN SATURATION: 99 %

## 2024-10-08 DIAGNOSIS — D50.9 IRON DEFICIENCY ANEMIA, UNSPECIFIED IRON DEFICIENCY ANEMIA TYPE: ICD-10-CM

## 2024-10-08 DIAGNOSIS — Z94.1 HEART REPLACED BY TRANSPLANT (H): ICD-10-CM

## 2024-10-08 LAB
COLONOSCOPY: NORMAL
GLUCOSE BLDC GLUCOMTR-MCNC: 113 MG/DL (ref 70–99)
UPPER GI ENDOSCOPY: NORMAL

## 2024-10-08 PROCEDURE — 88305 TISSUE EXAM BY PATHOLOGIST: CPT | Mod: TC | Performed by: INTERNAL MEDICINE

## 2024-10-08 PROCEDURE — 43239 EGD BIOPSY SINGLE/MULTIPLE: CPT | Performed by: NURSE ANESTHETIST, CERTIFIED REGISTERED

## 2024-10-08 PROCEDURE — T1013 SIGN LANG/ORAL INTERPRETER: HCPCS | Mod: U3

## 2024-10-08 PROCEDURE — 88305 TISSUE EXAM BY PATHOLOGIST: CPT | Mod: 26 | Performed by: STUDENT IN AN ORGANIZED HEALTH CARE EDUCATION/TRAINING PROGRAM

## 2024-10-08 PROCEDURE — 370N000017 HC ANESTHESIA TECHNICAL FEE, PER MIN: Performed by: INTERNAL MEDICINE

## 2024-10-08 PROCEDURE — 250N000011 HC RX IP 250 OP 636: Performed by: NURSE ANESTHETIST, CERTIFIED REGISTERED

## 2024-10-08 PROCEDURE — 43239 EGD BIOPSY SINGLE/MULTIPLE: CPT | Performed by: INTERNAL MEDICINE

## 2024-10-08 PROCEDURE — 82962 GLUCOSE BLOOD TEST: CPT

## 2024-10-08 PROCEDURE — 45378 DIAGNOSTIC COLONOSCOPY: CPT | Performed by: INTERNAL MEDICINE

## 2024-10-08 PROCEDURE — 250N000009 HC RX 250: Performed by: NURSE ANESTHETIST, CERTIFIED REGISTERED

## 2024-10-08 RX ORDER — OXYCODONE HYDROCHLORIDE 10 MG/1
10 TABLET ORAL
Status: DISCONTINUED | OUTPATIENT
Start: 2024-10-08 | End: 2024-10-08 | Stop reason: HOSPADM

## 2024-10-08 RX ORDER — ONDANSETRON 2 MG/ML
4 INJECTION INTRAMUSCULAR; INTRAVENOUS
Status: DISCONTINUED | OUTPATIENT
Start: 2024-10-08 | End: 2024-10-08 | Stop reason: HOSPADM

## 2024-10-08 RX ORDER — OXYCODONE HYDROCHLORIDE 5 MG/1
5 TABLET ORAL
Status: DISCONTINUED | OUTPATIENT
Start: 2024-10-08 | End: 2024-10-08 | Stop reason: HOSPADM

## 2024-10-08 RX ORDER — PROPOFOL 10 MG/ML
INJECTION, EMULSION INTRAVENOUS CONTINUOUS PRN
Status: DISCONTINUED | OUTPATIENT
Start: 2024-10-08 | End: 2024-10-09

## 2024-10-08 RX ORDER — LIDOCAINE 40 MG/G
CREAM TOPICAL
Status: DISCONTINUED | OUTPATIENT
Start: 2024-10-08 | End: 2024-10-08 | Stop reason: HOSPADM

## 2024-10-08 RX ORDER — NALOXONE HYDROCHLORIDE 0.4 MG/ML
0.1 INJECTION, SOLUTION INTRAMUSCULAR; INTRAVENOUS; SUBCUTANEOUS
Status: DISCONTINUED | OUTPATIENT
Start: 2024-10-08 | End: 2024-10-08 | Stop reason: HOSPADM

## 2024-10-08 RX ORDER — PROPOFOL 10 MG/ML
INJECTION, EMULSION INTRAVENOUS PRN
Status: DISCONTINUED | OUTPATIENT
Start: 2024-10-08 | End: 2024-10-09

## 2024-10-08 RX ORDER — ONDANSETRON 2 MG/ML
4 INJECTION INTRAMUSCULAR; INTRAVENOUS EVERY 30 MIN PRN
Status: DISCONTINUED | OUTPATIENT
Start: 2024-10-08 | End: 2024-10-08 | Stop reason: HOSPADM

## 2024-10-08 RX ORDER — LIDOCAINE HYDROCHLORIDE 20 MG/ML
INJECTION, SOLUTION INFILTRATION; PERINEURAL PRN
Status: DISCONTINUED | OUTPATIENT
Start: 2024-10-08 | End: 2024-10-09

## 2024-10-08 RX ORDER — ONDANSETRON 4 MG/1
4 TABLET, ORALLY DISINTEGRATING ORAL EVERY 30 MIN PRN
Status: DISCONTINUED | OUTPATIENT
Start: 2024-10-08 | End: 2024-10-08 | Stop reason: HOSPADM

## 2024-10-08 RX ORDER — DEXAMETHASONE SODIUM PHOSPHATE 4 MG/ML
4 INJECTION, SOLUTION INTRA-ARTICULAR; INTRALESIONAL; INTRAMUSCULAR; INTRAVENOUS; SOFT TISSUE
Status: DISCONTINUED | OUTPATIENT
Start: 2024-10-08 | End: 2024-10-08 | Stop reason: HOSPADM

## 2024-10-08 RX ADMIN — PROPOFOL 50 MG: 10 INJECTION, EMULSION INTRAVENOUS at 10:03

## 2024-10-08 RX ADMIN — LIDOCAINE HYDROCHLORIDE 50 MG: 20 INJECTION, SOLUTION INFILTRATION; PERINEURAL at 09:57

## 2024-10-08 RX ADMIN — PROPOFOL 50 MG: 10 INJECTION, EMULSION INTRAVENOUS at 09:57

## 2024-10-08 RX ADMIN — PROPOFOL 100 MCG/KG/MIN: 10 INJECTION, EMULSION INTRAVENOUS at 09:57

## 2024-10-08 ASSESSMENT — ACTIVITIES OF DAILY LIVING (ADL)
ADLS_ACUITY_SCORE: 37

## 2024-10-08 NOTE — OR NURSING
Patient had EGD with biopsies and INCOMPLETE colonoscopy secondary to poor prep.  Patient tolerated procedures under MAC

## 2024-10-08 NOTE — DISCHARGE INSTRUCTIONS
Discharge Instructions after  Upper Endoscopy (EGD)    Activity and Diet  You were given medicine for pain. You may be dizzy or sleepy.  For 24 hours:   Do not drive or use heavy equipment.   Do not make important decisions.   Do not drink any alcohol.  ___ You may return to your regular diet.    Discomfort  You may have a sore throat for 2 to 3 days. It may help to:   Avoid hot liquids for 24 hours.   Use sore throat lozenges.   Gargle as needed with salt water up to 4 times a day. Mix 1 cup of warm water  with 1 teaspoon of salt. Do not swallow.  ___ Your esophagus was dilated (opened) or banded during the exam:   Drink only cool liquids for the rest of the day. Eat a soft diet for the next few days.   You may have a sore chest for 2 to 3 days.    You may take Tylenol (acetaminophen) for pain unless your doctor has told you not to.    Discharge Instructions after Colonoscopy  or Sigmoidoscopy    Today you had a Colonoscopy    Activity and Diet  You were given medicine for pain. You may be dizzy or sleepy.  For 24 hours:   Do not drive or use heavy equipment.   Do not make important decisions.   Do not drink any alcohol.  You may return to your normal diet and medicines.    Discomfort   Air was placed in your colon during the exam in order to see it. Walking helps to pass the air.   You may take Tylenol (acetaminophen) for pain unless your doctor has told you not to.    Call right away if you have:   Unusual pain in belly or chest pain not relieved with passing air.   More than 1 to 2 Tablespoons of bleeding from your rectum.   Fever above 100.6  F (37.5  C).    If you have severe pain, bleeding, or shortness of breath, go to an emergency room.    If you have questions, call:  Monday to Friday, 8 a.m. to 4:30 p.m.  Central Scheduling Department: [phone]    After hours  Hospital: [phone] (Ask for the GI fellow on call)    When to call us:  Problems are rare. Call right away if you have:   Unusual throat  pain or trouble swallowing   Unusual pain in belly or chest that is not relieved by belching or passing air   Black stools (tar-like looking bowel movement)   Temperature above 100.6  F. (37.5  C).    If you vomit blood or have severe pain, go to an emergency room.    If you have questions, call:  Monday to Friday, 8 a.m. to 4:30 p.m.: [facility] Department:[phone]    After hours: Hospital: [phone] (Ask for the GI fellow on call)

## 2024-10-08 NOTE — ANESTHESIA CARE TRANSFER NOTE
Patient: Lucian Oliveira    Procedure: Procedure(s):  Colonoscopy  ESOPHAGOGASTRODUODENOSCOPY, WITH BIOPSY       Diagnosis: Iron deficiency anemia, unspecified iron deficiency anemia type [D50.9]  Anemia due to folic acid deficiency, unspecified deficiency type [D52.9]  Heart replaced by transplant (H) [Z94.1]  Diagnosis Additional Information: No value filed.    Anesthesia Type:   MAC     Note:    Oropharynx: oropharynx clear of all foreign objects  Level of Consciousness: drowsy  Oxygen Supplementation: face mask  Level of Supplemental Oxygen (L/min / FiO2): 4  Independent Airway: airway patency satisfactory and stable    Vital Signs Stable: post-procedure vital signs reviewed and stable  Report to RN Given: handoff report given  Patient transferred to: Phase II    Handoff Report: Identifed the Patient, Identified the Reponsible Provider, Reviewed the pertinent medical history, Discussed the surgical course, Reviewed Intra-OP anesthesia mangement and issues during anesthesia, Set expectations for post-procedure period and Allowed opportunity for questions and acknowledgement of understanding      Vitals:  Vitals Value Taken Time   /59 10/08/24 1034   Temp     Pulse 84    Resp     SpO2 100 % 10/08/24 1035   Vitals shown include unfiled device data.    Electronically Signed By: ELIJAH Crocker CRNA  October 8, 2024  10:35 AM

## 2024-10-08 NOTE — LETTER
"October 9, 2024      Lucian Oliveira  [address] [address]        Dear ,    The biopsies from the duodenum (small bowel) showed no evidence of infection or celiac disease, which is good news. Please follow with your provider for further management of anemia and repeat colonoscopy.     Resulted Orders   Surgical Pathology Exam   Result Value Ref Range    Case Report       Surgical Pathology Report                         Case: [id number]                                  Authorizing Provider:  Rip Bryan MD      Collected:           10/08/2024 09:59 AM          Ordering Location:     [facility]          Received:            10/08/2024 10:38 AM                                 Endoscopy                                                                    Pathologist:           Luis Angel Rodriguez MD                                                          Specimen:    Other, duodenal biopsies                                                                   Final Diagnosis       DUODENUM, BIOPSY:  - Small bowel mucosa without diagnostic abnormality. Villi and plasma cells are present.  - No evidence of Whipple's disease, celiac sprue, or Giardia.         Clinical Information       Biopsies for histology for evaluation of celiac disease.      Gross Description       A(1). Other, duodenal biopsies:  The specimen is received in formalin with proper patient identification, labeled \"duodenal biopsies\".  The specimen consists of 4 pieces of tan-white soft tissue ranging from 0.2 to 0.4 cm in greatest dimension.  Submitted in A1.       Microscopic Description       Microscopic examination has been performed.    Case was reviewed by the following:  Pathology Fellow: Ben Olivas MD  A resident or fellow in a training program was involved in the initial review, preparation, and/or interpretation of this case.  I, as the senior physician, attest that I have personally reviewed all " specimens and or slides, including the listed special stains, and used them with my medical judgement to determine the final diagnosis.              Performing Labs       The technical component of this testing was completed at [facility].    Stain controls for all stains resulted within this report have been reviewed and show appropriate reactivity.       Case Images         If you have any questions or concerns, please call the clinic at the number listed above.       Sincerely,      Rip Bryan MD

## 2024-10-08 NOTE — ANESTHESIA POSTPROCEDURE EVALUATION
Patient: Lucian Oliveira    Procedure: Procedure(s):  Colonoscopy  ESOPHAGOGASTRODUODENOSCOPY, WITH BIOPSY       Anesthesia Type:  MAC    Note:  Disposition: Outpatient   Postop Pain Control: Uneventful            Sign Out: Well controlled pain   PONV: No   Neuro/Psych: Uneventful            Sign Out: Acceptable/Baseline neuro status   Airway/Respiratory: Uneventful            Sign Out: Acceptable/Baseline resp. status   CV/Hemodynamics: Uneventful            Sign Out: Acceptable CV status; No obvious hypovolemia; No obvious fluid overload   Other NRE: NONE   DID A NON-ROUTINE EVENT OCCUR? No           Last vitals:  Vitals Value Taken Time   /58 10/08/24 1040   Temp     Pulse     Resp     SpO2 98 % 10/08/24 1049   Vitals shown include unfiled device data.    Electronically Signed By: Alicia Cardoso MD  October 8, 2024  10:49 AM

## 2024-10-08 NOTE — ANESTHESIA PREPROCEDURE EVALUATION
Anesthesia Pre-Procedure Evaluation    Patient: Lucian Oliveira   MRN: [id number] : 1955        Procedure : Procedure(s):  Colonoscopy  Esophagoscopy, gastroscopy, duodenoscopy (EGD), combined          Past Medical History:   Diagnosis Date    CAD (coronary artery disease)     CHF (congestive heart failure) (H)     CKD (chronic kidney disease), stage III (H)     Cortical cataract of both eyes     Diabetes (H)     Hyperlipidemia     Hypertension     Infection due to Streptococcus mitis group 2020    Ischemic cardiomyopathy     Obesity     CHANTEL (obstructive sleep apnea)     occas cpap    CHANTEL (obstructive sleep apnea)- severe (AHI 30)     Osteoarthritis       Past Surgical History:   Procedure Laterality Date    CATARACT IOL, RT/LT      COLONOSCOPY N/A 2019    Procedure: COLONOSCOPY, WITH POLYPECTOMY AND BIOPSY;  Surgeon: Chauncey Morataya MD;  Location:  GI    COLONOSCOPY N/A 2023    Procedure: Colonoscopy;  Surgeon: Mariano Velasquez MD;  Location:  GI    CV ANGIOGRAM CORONARY GRAFT N/A 2020    Procedure: Angiogram Coronary Graft;  Surgeon: Alex Lantigua MD;  Location:  HEART CARDIAC CATH LAB    CV CORONARY ANGIOGRAM N/A 2020    Procedure: CV CORONARY ANGIOGRAM;  Surgeon: Alex Lantigua MD;  Location:  HEART CARDIAC CATH LAB    CV CORONARY ANGIOGRAM N/A 2021    Procedure: CV CORONARY ANGIOGRAM;  Surgeon: Raimundo Hudson MD;  Location:  HEART CARDIAC CATH LAB    CV CORONARY ANGIOGRAM N/A 2022    Procedure: Coronary Angiogram- BIPLANE;  Surgeon: Raimundo Hudson MD;  Location:  HEART CARDIAC CATH LAB    CV CORONARY ANGIOGRAM N/A 2023    Procedure: Coronary Angiogram;  Surgeon: Alex Lantigua MD;  Location: [facility] CARDIAC CATH LAB    CV HEART BIOPSY N/A 2020    Procedure: Heart Biopsy;  Surgeon: Mario Burr MD;  Location: [facility] CARDIAC CATH LAB    CV HEART BIOPSY N/A  8/3/2020    Procedure: CV HEART BIOPSY;  Surgeon: Alex Lantigua MD;  [facility]    CV HEART BIOPSY N/A 8/10/2020    Procedure: CV HEART BIOPSY;  Surgeon: Mario Burr MD;  [facility]    CV HEART BIOPSY N/A 8/17/2020    Procedure: CV HEART BIOPSY;  Surgeon: Raimundo Hudson MD;  Location: [facility]    CV HEART BIOPSY N/A 8/31/2020    Procedure: CV HEART BIOPSY;  Surgeon: Moises Santos MD;  Location: [facility] CARD[facility] CATH LAB    CV HEART BIOPSY N/A 9/14/2020    Procedure: CV HEART BIOPSY;  Surgeon: Raimundo Hudson MD;  Location: [facility]    CV HEART BIOPSY N/A 9/28/2020    Procedure: CV HEART BIOPSY;  Surgeon: Raimundo Hudson MD;  Location: [facility]    CV HEART BIOPSY N/A 10/12/2020    Procedure: CV HEART BIOPSY;  Surgeon: John Stuart MD;  [facility]    CV HEART BIOPSY N/A 11/10/2020    Procedure: CV HEART BIOPSY;  Surgeon: John Stuart MD;  Location: [facility]    CV HEART BIOPSY N/A 12/7/2020    Procedure: CV HEART BIOPSY;  Surgeon: Raimundo Hudson MD;  Location:  HEART CARDIAC CATH LAB    CV HEART BIOPSY N/A 1/5/2021    Procedure: CV HEART BIOPSY;  Surgeon: Raimundo Hudson MD;  Location: [facility]    CV HEART BIOPSY N/A 7/20/2021    Procedure: CV HEART BIOPSY;  Surgeon: Raimundo Hudson MD;  Location: [facility]ARD[facility] CATH LAB    CV HEART BIOPSY N/A 9/28/2021    Procedure: CV HEART BIOPSY;  Surgeon: Raimundo Hudson MD;  Location: [facility] CATH LAB    CV HEART BIOPSY N/A 9/12/2022    Procedure: Heart Biopsy;  Surgeon: TristanRaimundo tineo MD;  Location:  HEART CARDIAC CATH LAB    CV HEART BIOPSY N/A 7/17/2023    Procedure: Heart Biopsy;  Surgeon: Alex Lantigua MD;  Location:  HEART CARDIAC CATH LAB    CV  INTRA AORTIC BALLOON N/A 7/14/2020    Procedure: RHC WITH LEAVE IN [place]/IABP;  Surgeon: Sonny Saleh MD;  Location: [facility] CARD[facility] CATH LAB    CV INTRAVASULAR ULTRASOUND N/A 9/12/2022    Procedure: Intravascular Ultrasound;  Surgeon: Raimundo Hudson MD;  Location: [facility] CARDIAC CATH LAB    CV RIGHT HEART CATH MEASUREMENTS RECORDED N/A 3/25/2019    Procedure: CV RIGHT HEART CATH;  Surgeon: Moises Satnos MD;  Location: [facility]ARD[facility] CATH LAB    CV RIGHT HEART CATH MEASUREMENTS RECORDED N/A 7/10/2019    Procedure: CV RIGHT HEART CATH;  Surgeon: Jak Mccabe MD;  Location: [facility] CARDIAC CATH LAB    CV RIGHT HEART CATH MEASUREMENTS RECORDED N/A 7/8/2020    Procedure: Right Heart Cath with Leave In [place] already has ICU load;  Surgeon: Jak Mccabe MD;  Location: [facility] CARDIAC CATH LAB    CV RIGHT HEART CATH MEASUREMENTS RECORDED N/A 7/14/2020    Procedure: CV RIGHT HEART CATH;  Surgeon: Sonny Saleh MD;  Location: [facility] CATH LAB    CV RIGHT HEART CATH MEASUREMENTS RECORDED N/A 7/2/2020    Procedure: CV RIGHT HEART CATH;  Surgeon: Alex Lantigua MD;  Location: [facility]ARD[facility] CATH LAB    CV RIGHT HEART CATH MEASUREMENTS RECORDED N/A 7/27/2020    Procedure: Right Heart Cath;  Surgeon: Mario Burr MD;  Location:  HEART CARDIAC CATH LAB    CV RIGHT HEART CATH MEASUREMENTS RECORDED N/A 8/3/2020    Procedure: Right Heart Cath;  Surgeon: Alex Lantigua MD;  Location: [facility]    CV RIGHT HEART CATH MEASUREMENTS RECORDED N/A 8/10/2020    Procedure: CV RIGHT HEART CATH;  Surgeon: Mario Burr MD;  Location: [facility]    CV RIGHT HEART CATH MEASUREMENTS RECORDED N/A 8/17/2020    Procedure: CV RIGHT HEART CATH;  Surgeon: Raimundo Hudson MD;  Location:  HEART CARDIAC CATH LAB    CV RIGHT HEART CATH MEASUREMENTS RECORDED N/A 8/31/2020    Procedure: CV RIGHT HEART CATH;  Surgeon: Tom  MD Moises;  Location:  HEART CARDIAC CATH LAB    CV RIGHT HEART CATH MEASUREMENTS RECORDED N/A 9/14/2020    Procedure: CV RIGHT HEART CATH;  Surgeon: Raimundo Hudson MD;  Location:  HEART CARDIAC CATH LAB    CV RIGHT HEART CATH MEASUREMENTS RECORDED N/A 9/28/2020    Procedure: CV RIGHT HEART CATH;  Surgeon: Raimundo Hudson MD;  Location:  HEART CARDIAC CATH LAB    CV RIGHT HEART CATH MEASUREMENTS RECORDED N/A 10/12/2020    Procedure: CV RIGHT HEART CATH;  Surgeon: John Stuart MD;  Location:  HEART CARDIAC CATH LAB    CV RIGHT HEART CATH MEASUREMENTS RECORDED N/A 11/10/2020    Procedure: CV RIGHT HEART CATH;  Surgeon: John Stuart MD;  Location:  HEART CARDIAC CATH LAB    CV RIGHT HEART CATH MEASUREMENTS RECORDED N/A 12/7/2020    Procedure: CV RIGHT HEART CATH;  Surgeon: Raimundo Hudson MD;  Location:  HEART CARDIAC CATH LAB    CV RIGHT HEART CATH MEASUREMENTS RECORDED N/A 1/5/2021    Procedure: CV RIGHT HEART CATH;  Surgeon: Raimundo Hudson MD;  Location:  HEART CARDIAC CATH LAB    CV RIGHT HEART CATH MEASUREMENTS RECORDED N/A 7/20/2021    Procedure: CV RIGHT HEART CATH;  Surgeon: Raimundo Hudson MD;  Location:  HEART CARDIAC CATH LAB    CV RIGHT HEART CATH MEASUREMENTS RECORDED N/A 9/28/2021    Procedure: CV RIGHT HEART CATH;  Surgeon: Raimundo Hudson MD;  Location:  HEART CARDIAC CATH LAB    CV RIGHT HEART CATH MEASUREMENTS RECORDED N/A 9/12/2022    Procedure: Right Heart Catheterization;  Surgeon: Raimundo Hudson MD;  Location:  HEART CARDIAC CATH LAB    CV RIGHT HEART CATH MEASUREMENTS RECORDED N/A 7/17/2023    Procedure: Right Heart Catheterization;  Surgeon: Alex Lantigua MD;  Location:  HEART CARDIAC CATH LAB    EXTRACTION(S) DENTAL Left 7/13/2020    Procedure: EXTRACTION, TOOTH #11, 12, 13, 15, and 29;  Surgeon: Monica Chao DDS;  [facility]    IMPLANT  AUTOMATIC IMPLANTABLE CARDIOVERTER DEFIBRILLATOR      INCISION AND DRAINAGE STERNUM W/ IRRIGATION SYSTEM, COMBINED N/A 9/23/2020    Procedure: INCISION AND DRAINAGE, LEFT SUPRACLAVICULAR WOUND INFECTION AND ABDOMENN WOUND.;  Surgeon: Ran Huertas MD;  [facility]    INSERT INTRAAORTIC BALLOON PUMP N/A 7/16/2020    Procedure: Subclavian Intra-Aortic Balloon Pump Placement;  Surgeon: Allan Sparrow MD;  [facility]    IRRIGATION AND DEBRIDEMENT CHEST WASHOUT, COMBINED N/A 9/28/2020    Procedure: IRRIGATION AND DEBRIDEMENT OF LEFT UPPER CHEST WOUND.;  Surgeon: Ran Huertas MD;  [facility]    PHACOEMULSIFICATION CLEAR CORNEA WITH STANDARD INTRAOCULAR LENS IMPLANT Right 2/6/2023    Procedure: RIGHT EYE PHACOEMULSIFICATION, CATARACT, WITH INTRAOCULAR LENS IMPLANT with trypan blue;  Surgeon: Triny Bunch MD;  Location: [facility] OR    PHACOEMULSIFICATION CLEAR CORNEA WITH STANDARD IOL, VITRECTOMY PARSPLANA 25 GAGUE, COMBINED Left 12/10/2019    Procedure: PHACOEMULSIFICATION, CATARACT, CLEAR CORNEAL INCISION APPROACH, W STD INTRAOCULAR LENS IMPLANT INSERT + VITRECTOMY BY PARS PLANA  USING 25-GAUGE INSTRUMENTS. ENDOLASER, INFUSION OF 20% SF6 GAS;  Surgeon: Triny Bunch MD;  [facility]    PICC DOUBLE LUMEN PLACEMENT Left 07/28/2020    5Fr - 42cm, Basilic vein, mid SVC    PICC INSERTION Right 07/11/2020    basilic 44 cm total     TRANSPLANT HEART RECIPIENT N/A 7/19/2020    Procedure: REDO MEDIAN STERNOTOMY, TRANSPLANT, ORTHOTOPIC HEART, RECIPIENT, ON PUMP OXYGENATOR, REMOVAL OF CARDIAC DEFIBRILLATOR AND LEAD;  Surgeon: Griselli, Massimo, MD;  [facility]    VITRECTOMY, PARS PLANA APPROACH, USING 27-GAUGE INSTRUMENTS Left 12/21/2020    Procedure: 27 gauge pars plana vitectomy, membrane peel, perfluoroctane liquid (PFO), retinectomy, endolaser, Silicone Oil 1000 cs;  Surgeon: Triny Bunch MD;  Location: [facility] OR    C CABG, ARTERY-VEIN, THREE  02/2008      Allergies   Allergen  Reactions    Heparin Heparin Induced Thrombocytopenia     HIT screen sent 7/18/2020. CORRINE confirmed positive      Social History     Tobacco Use    Smoking status: Never    Smokeless tobacco: Never    Tobacco comments:     Never smoked; non-smoking household   Substance Use Topics    Alcohol use: No     Alcohol/week: 0.0 standard drinks of alcohol      Wt Readings from Last 1 Encounters:   09/27/24 83.5 kg (184 lb)        Anesthesia Evaluation   Pt has had prior anesthetic.     No history of anesthetic complications       ROS/MED HX  ENT/Pulmonary:     (+) sleep apnea,                                       Neurologic:       Cardiovascular: Comment: s/p CABG in 2008, ICM   s/p orthotopic heart transplant in 7/2020,    (+)  hypertension- -  CAD angina-  - -      CHF             ICD             pulmonary hypertension, Previous cardiac testing   Echo: Date: 7/2024 Results:    Stress Test:  Date: 7/2024 Results:    ECG Reviewed:  Date: 7/2024 Results:    Cath:  Date: 7/2024 Results:      METS/Exercise Tolerance: >4 METS    Hematologic: Comments: History of HIT  Hx of right-sided deep venous thrombosis in the right internal jugular vein and right axillary vein 2020    (+) History of blood clots,    pt is not anticoagulated, anemia,          Musculoskeletal:   (+)  arthritis,             GI/Hepatic:       Renal/Genitourinary:     (+) renal disease,             Endo:     (+)  type II DM,       Diabetic complications: neuropathy.      Obesity,       Psychiatric/Substance Use:       Infectious Disease:       Malignancy:       Other:            Physical Exam    Airway        Mallampati: III   TM distance: > 3 FB   Neck ROM: full   Mouth opening: > 3 cm    Respiratory Devices and Support         Dental       (+) Modest Abnormalities - crowns, retainers, 1 or 2 missing teeth      Cardiovascular   cardiovascular exam normal          Pulmonary   pulmonary exam normal                OUTSIDE LABS:  CBC:   Lab Results   Component  Value Date    WBC 6.6 07/22/2024    WBC 3.7 (L) 04/16/2024    HGB 10.7 (L) 07/22/2024    HGB 10.2 (L) 04/29/2024    HCT 33.8 (L) 07/22/2024    HCT 30.9 (L) 04/29/2024     07/22/2024     04/16/2024     BMP:   Lab Results   Component Value Date     07/22/2024     04/16/2024    POTASSIUM 3.6 07/22/2024    POTASSIUM 4.8 04/16/2024    CHLORIDE 105 07/22/2024    CHLORIDE 104 04/16/2024    CO2 26 07/22/2024    CO2 23 04/16/2024    BUN 34.8 (H) 07/22/2024    BUN 34.1 (H) 04/16/2024    CR 2.25 (H) 07/22/2024    CR 2.22 (H) 04/16/2024    GLC 99 07/22/2024    GLC 97 04/16/2024     COAGS:   Lab Results   Component Value Date    PTT 31 09/22/2020    INR 1.02 11/10/2020    FIBR 295 07/20/2020     POC:   Lab Results   Component Value Date     (H) 12/21/2020     HEPATIC:   Lab Results   Component Value Date    ALBUMIN 3.8 07/22/2024    PROTTOTAL 6.6 07/22/2024    ALT 8 07/22/2024    AST 15 07/22/2024    ALKPHOS 119 07/22/2024    BILITOTAL 0.3 07/22/2024     OTHER:   Lab Results   Component Value Date    PH 7.47 (H) 07/23/2020    LACT 1.1 10/11/2023    A1C 7.3 (H) 07/22/2024    BRYANNA 8.9 07/22/2024    PHOS 3.1 07/22/2024    MAG 2.2 07/22/2024    AMYLASE 36 07/19/2020    TSH 0.80 08/05/2020    CRP 16.0 (H) 09/25/2020       Anesthesia Plan    ASA Status:  2    NPO Status:  NPO Appropriate    Anesthesia Type: MAC.     - Reason for MAC: straight local not clinically adequate   Induction: Intravenous.   Maintenance: TIVA.        Consents    Anesthesia Plan(s) and associated risks, benefits, and realistic alternatives discussed. Questions answered and patient/representative(s) expressed understanding.     - Discussed: Risks, Benefits and Alternatives for the PROCEDURE were discussed     - Discussed with:  Patient            Postoperative Care    Pain management: Multi-modal analgesia.   PONV prophylaxis: Ondansetron (or other 5HT-3), Background Propofol Infusion     Comments:               Alicia RODRIGUEZ  "MD Janae    I have reviewed the pertinent notes and labs in the chart from the past 30 days and (re)examined the patient.  Any updates or changes from those notes are reflected in this note.              # Hypertension: Noted on problem list  # Chronic heart failure with preserved ejection fraction: heart failure noted on problem list and last echo with EF >50%       # DMII: A1C = 7.3 % (Ref range: <5.7 %) within past 6 months    # Obesity: Estimated body mass index is 30.62 kg/m  as calculated from the following:    Height as of 9/27/24: 1.651 m (5' 5\").    Weight as of 9/27/24: 83.5 kg (184 lb).             "

## 2024-10-15 PROBLEM — A04.4 E. COLI COLITIS: Status: ACTIVE | Noted: 2023-10-12

## 2024-10-15 PROBLEM — A08.11 NOROVIRUS: Status: ACTIVE | Noted: 2023-10-12

## 2024-10-17 DIAGNOSIS — E11.3513 TYPE 2 DIABETES MELLITUS WITH PROLIFERATIVE RETINOPATHY OF BOTH EYES AND MACULAR EDEMA, UNSPECIFIED WHETHER LONG TERM INSULIN USE (H): Primary | ICD-10-CM

## 2024-10-22 ENCOUNTER — PATIENT OUTREACH (OUTPATIENT)
Dept: CARE COORDINATION | Facility: CLINIC | Age: 69
End: 2024-10-22
Payer: COMMERCIAL

## 2024-10-22 NOTE — PROGRESS NOTES
Anemia Management Note - Reminder     Follow-up with anemia management service:    Lucian is due for anemia labs. Next lab appt is planned in early December.     LVM for Lucian through  to have labs done in the next week or so at any [place] lab          Latest Ref Rng & Units 2023 2023 1/10/2024 2024 2024 2024 2024   Anemia   DANDY Dose  40mcg 40mcg 40mcg       Hemoglobin 13.3 - 17.7 g/dL 8.0  8.1  9.1  11.6  10.2  10.2  10.7    TSAT 15 - 46 % 23   30  30   26  26    Ferritin 31 - 409 ng/mL 304   277  398   284  278         Lab orders under Arlin Guajardo NP will  on 1105. She is no longer with the organization. Will need to find different provider for ongoing lab orders.    Follow-up call date: 1029    Carrie Leopold, RN BSN  Anemia Services  [facility]  [email or web address]  Office: [phone]  Fax [phone]

## 2024-10-23 ENCOUNTER — TELEPHONE (OUTPATIENT)
Dept: CARDIOLOGY | Facility: CLINIC | Age: 69
End: 2024-10-23
Payer: COMMERCIAL

## 2024-10-23 ENCOUNTER — APPOINTMENT (OUTPATIENT)
Dept: INTERPRETER SERVICES | Facility: CLINIC | Age: 69
End: 2024-10-23
Payer: COMMERCIAL

## 2024-10-23 DIAGNOSIS — Z94.1 HEART REPLACED BY TRANSPLANT (H): ICD-10-CM

## 2024-10-23 DIAGNOSIS — Z94.1 HEART TRANSPLANT, ORTHOTOPIC, STATUS (H): ICD-10-CM

## 2024-10-23 RX ORDER — SULFAMETHOXAZOLE AND TRIMETHOPRIM 400; 80 MG/1; MG/1
1 TABLET ORAL
Qty: 45 TABLET | Refills: 3 | Status: SHIPPED | OUTPATIENT
Start: 2024-10-24

## 2024-10-23 RX ORDER — FUROSEMIDE 20 MG/1
20 TABLET ORAL DAILY
Qty: 100 TABLET | Refills: 2 | Status: SHIPPED | OUTPATIENT
Start: 2024-10-23

## 2024-10-23 RX ORDER — LISINOPRIL 5 MG/1
5 TABLET ORAL DAILY
Qty: 100 TABLET | Refills: 2 | Status: SHIPPED | OUTPATIENT
Start: 2024-10-23

## 2024-10-23 RX ORDER — ROSUVASTATIN CALCIUM 20 MG/1
20 TABLET, COATED ORAL DAILY
Qty: 100 TABLET | Refills: 2 | Status: SHIPPED | OUTPATIENT
Start: 2024-10-23

## 2024-10-23 NOTE — TELEPHONE ENCOUNTER
Patient requesting refill of Bactrim 400-80 mg prescription.     Patient has [facility] coverage and is part of Medicare Part-D Low Income Subsidy program. With this program, the patient is eligible to get certain prescriptions as a 100-day supply at the 30-day prescription supply cost.      New prescription needed given insufficient refills remaining so pended for prescriber review and signature if appropriate.       Thank you!    Niesha Lucas, PharmD, [facility], [place]  Population Health Pharmacist  [phone]

## 2024-10-23 NOTE — TELEPHONE ENCOUNTER
Patient is overdue to fill rosuvastatin. Per our records, last filled 3/8/24 for 90 day supply and is 52 days late to fill. Via , contacted patient and he requests prescription be transferred to [facility].     Patient has [facility] coverage and with this insurance plan, the patient is eligible to receive certain prescriptions as a 100-day supply at the 90-day supply cost.      Prescriptions updated to 100-day supply per protocol: lisinopril, rosuvastatin, and furosemide      Niesha Lucas, PharmD, [facility], [place]  Population Health Pharmacist  [phone]

## 2024-10-27 NOTE — TELEPHONE ENCOUNTER
magnesium oxide 400 MG   Last Written Prescription Date:  6/20/23  Last Fill Quantity: 180,   # refills: 1  Last Office Visit : 11/8/23  Future Office visit:  none    CALCIUM CARBONATE-VITAMIN D3 600-400 MG   Last Written Prescription Date:  9/2/22  Last Fill Quantity: 180,   # refills: 3  Routing refill request to provider for review/approval & Failed protocol   
[FreeTextEntry1] : 70 years old female here for ill visit. After thorough history and physical exam, patient has no acute distress, wheezing, dark mucus/phlegm, fever, tachycardia, tachypnea, hypoxia, dyspnea or chest pain.   IMPRESSION: Viral URI Viral acute sinusitis COVID testing negative Flu test negative Patient also has multiple allergies to antibiotics. Currently, patient does not need antibiotics as symptoms and physical exam suggest viral illness. Treatment is supportive care: rest, Flonase- as directed, Mucinex as directed, increase PO fluids RV 1 week However, patient is a smoker and has COPD, and if mucus or phlegm should become yellow/green, or fever/chills worsening cough, CP or dyspnea- pt to return sooner for re-evaluation. Patient understands instructions and agrees with plan

## 2024-10-29 NOTE — PROGRESS NOTES
Anemia Management Note - Reminder     Follow-up with anemia management service:    Called patient to remind him to get anemia labs drawn, as last drawn 3 months ago. He says he's feeling 'OK' and would prefer to wait until his lab and follow up appts on December 5. He denies any SOB, chest pain or fatigue and verbalized understanding to call in and/or have labs done sooner if symptomatic. He had no questions and confirmed our call back number.         Latest Ref Rng & Units 12/11/2023 12/26/2023 1/10/2024 4/1/2024 4/16/2024 4/29/2024 7/22/2024   Anemia   DANDY Dose  40mcg 40mcg 40mcg       Hemoglobin 13.3 - 17.7 g/dL 8.0  8.1  9.1  11.6  10.2  10.2  10.7    TSAT 15 - 46 % 23   30  30   26  26    Ferritin 31 - 409 ng/mL 304   277  398   284  278         Follow-up call date: [id number] review labs and OV notes    Carrie Leopold, RN BSN  Anemia Services  [facility]  [email or web address]  Office: [phone]  Fax [phone]

## 2024-10-30 ENCOUNTER — OFFICE VISIT (OUTPATIENT)
Dept: OPHTHALMOLOGY | Facility: CLINIC | Age: 69
End: 2024-10-30
Attending: OPHTHALMOLOGY
Payer: COMMERCIAL

## 2024-10-30 DIAGNOSIS — E11.3513 TYPE 2 DIABETES MELLITUS WITH PROLIFERATIVE RETINOPATHY OF BOTH EYES AND MACULAR EDEMA, UNSPECIFIED WHETHER LONG TERM INSULIN USE (H): ICD-10-CM

## 2024-10-30 DIAGNOSIS — H43.11 VITREOUS HEMORRHAGE OF RIGHT EYE (H): Primary | ICD-10-CM

## 2024-10-30 PROCEDURE — 99207 PR DROP WITH A PROCEDURE: CPT | Performed by: OPHTHALMOLOGY

## 2024-10-30 PROCEDURE — 250N000011 HC RX IP 250 OP 636: Performed by: OPHTHALMOLOGY

## 2024-10-30 PROCEDURE — 67028 INJECTION EYE DRUG: CPT | Mod: RT | Performed by: OPHTHALMOLOGY

## 2024-10-30 RX ADMIN — Medication 1.25 MG: at 09:05

## 2024-10-30 ASSESSMENT — VISUAL ACUITY
METHOD: SNELLEN - LINEAR
OS_CC: CF 2'
OD_CC: 20/30
OD_CC+: -2

## 2024-10-30 ASSESSMENT — CONF VISUAL FIELD
OD_SUPERIOR_NASAL_RESTRICTION: 0
OS_SUPERIOR_TEMPORAL_RESTRICTION: 1
OD_NORMAL: 1
METHOD: COUNTING FINGERS
OS_SUPERIOR_NASAL_RESTRICTION: 1
OS_INFERIOR_TEMPORAL_RESTRICTION: 1
OD_SUPERIOR_TEMPORAL_RESTRICTION: 0
OD_INFERIOR_NASAL_RESTRICTION: 0
OS_INFERIOR_NASAL_RESTRICTION: 1
OD_INFERIOR_TEMPORAL_RESTRICTION: 0

## 2024-10-30 ASSESSMENT — SLIT LAMP EXAM - LIDS
COMMENTS: NORMAL
COMMENTS: NORMAL

## 2024-10-30 ASSESSMENT — EXTERNAL EXAM - RIGHT EYE: OD_EXAM: NORMAL

## 2024-10-30 ASSESSMENT — REFRACTION_WEARINGRX
OD_SPHERE: -0.50
OD_ADD: +2.50
OS_SPHERE: BALANCE
OD_AXIS: 017
SPECS_TYPE: LAST MR
OD_CYLINDER: +1.00

## 2024-10-30 ASSESSMENT — TONOMETRY
OS_IOP_MMHG: 17
IOP_METHOD: TONOPEN
OD_IOP_MMHG: 20

## 2024-10-30 ASSESSMENT — CUP TO DISC RATIO: OD_RATIO: 0.25

## 2024-10-30 ASSESSMENT — EXTERNAL EXAM - LEFT EYE: OS_EXAM: NORMAL

## 2024-10-30 NOTE — NURSING NOTE
"Chief Complaints and History of Present Illnesses   Patient presents with    Diabetic Retinopathy Follow Up     Chief Complaint(s) and History of Present Illness(es)       Diabetic Retinopathy Follow Up              Laterality: both eyes    Associated symptoms: floaters.  Negative for dryness, eye pain, flashes, itching and burning    Pain scale: 0/10              Comments    Lucian is here to continue care for diabetic retinopathy. He states he has been seeing \"spots \" in right eye since last visit.    Ivan Tejeda COT 8:31 AM October 30, 2024                      "

## 2024-10-31 ENCOUNTER — TELEPHONE (OUTPATIENT)
Dept: TRANSPLANT | Facility: CLINIC | Age: 69
End: 2024-10-31
Payer: COMMERCIAL

## 2024-10-31 ENCOUNTER — APPOINTMENT (OUTPATIENT)
Dept: INTERPRETER SERVICES | Facility: CLINIC | Age: 69
End: 2024-10-31
Payer: COMMERCIAL

## 2024-10-31 DIAGNOSIS — Z94.1 HEART REPLACED BY TRANSPLANT (H): ICD-10-CM

## 2024-10-31 RX ORDER — SULFAMETHOXAZOLE AND TRIMETHOPRIM 400; 80 MG/1; MG/1
1 TABLET ORAL
Qty: 45 TABLET | Refills: 3 | Status: CANCELLED | OUTPATIENT
Start: 2024-10-31

## 2024-10-31 NOTE — TELEPHONE ENCOUNTER
Writer returned call to pt regarding Bactrim.  Pt should already have refills on file. Pt states he ordered med from  mail order and has not received it.  Confirmed with  pharmacy that pt did not order med- order placed for pt and should arrive early next week.

## 2024-10-31 NOTE — TELEPHONE ENCOUNTER
sulfamethoxazole-trimethoprim (BACTRIM) 400-80 MG tablet     [facility] - [place], [place] - [facility] [address] Phone: [phone]   Fax: [phone]          Per wife Shivani;  They have not received Bactrim in the mail yet.  And she stated he only has 5 pills left.

## 2024-11-12 DIAGNOSIS — Z94.1 HEART TRANSPLANT, ORTHOTOPIC, STATUS (H): ICD-10-CM

## 2024-11-14 DIAGNOSIS — Z94.1 HEART TRANSPLANT, ORTHOTOPIC, STATUS (H): ICD-10-CM

## 2024-11-19 DIAGNOSIS — Z94.1 HEART TRANSPLANT, ORTHOTOPIC, STATUS (H): ICD-10-CM

## 2024-11-19 RX ORDER — TAMSULOSIN HYDROCHLORIDE 0.4 MG/1
CAPSULE ORAL
Qty: 30 CAPSULE | Refills: 0 | OUTPATIENT
Start: 2024-11-19

## 2024-11-19 RX ORDER — TAMSULOSIN HYDROCHLORIDE 0.4 MG/1
0.4 CAPSULE ORAL EVERY MORNING
Qty: 90 CAPSULE | Refills: 3 | Status: SHIPPED | OUTPATIENT
Start: 2024-11-19

## 2024-11-22 ENCOUNTER — APPOINTMENT (OUTPATIENT)
Dept: INTERPRETER SERVICES | Facility: CLINIC | Age: 69
End: 2024-11-22
Payer: COMMERCIAL

## 2024-11-26 DIAGNOSIS — N18.32 STAGE 3B CHRONIC KIDNEY DISEASE (CKD) (H): Primary | ICD-10-CM

## 2024-12-04 DIAGNOSIS — H43.11 VITREOUS HEMORRHAGE OF RIGHT EYE (H): Primary | ICD-10-CM

## 2024-12-05 ENCOUNTER — LAB (OUTPATIENT)
Dept: LAB | Facility: CLINIC | Age: 69
End: 2024-12-05
Attending: PHYSICIAN ASSISTANT
Payer: COMMERCIAL

## 2024-12-05 ENCOUNTER — OFFICE VISIT (OUTPATIENT)
Dept: NEPHROLOGY | Facility: CLINIC | Age: 69
End: 2024-12-05
Attending: PHYSICIAN ASSISTANT
Payer: COMMERCIAL

## 2024-12-05 VITALS
SYSTOLIC BLOOD PRESSURE: 152 MMHG | HEIGHT: 65 IN | DIASTOLIC BLOOD PRESSURE: 71 MMHG | BODY MASS INDEX: 29.99 KG/M2 | HEART RATE: 84 BPM | WEIGHT: 180 LBS

## 2024-12-05 DIAGNOSIS — E11.21 TYPE 2 DIABETES MELLITUS WITH DIABETIC NEPHROPATHY, WITH LONG-TERM CURRENT USE OF INSULIN (H): ICD-10-CM

## 2024-12-05 DIAGNOSIS — R60.9 EDEMA, UNSPECIFIED TYPE: ICD-10-CM

## 2024-12-05 DIAGNOSIS — N18.32 ANEMIA DUE TO STAGE 3B CHRONIC KIDNEY DISEASE (H): ICD-10-CM

## 2024-12-05 DIAGNOSIS — D63.1 ANEMIA DUE TO STAGE 3B CHRONIC KIDNEY DISEASE (H): ICD-10-CM

## 2024-12-05 DIAGNOSIS — D63.1 ANEMIA OF CHRONIC RENAL FAILURE, STAGE 4 (SEVERE) (H): ICD-10-CM

## 2024-12-05 DIAGNOSIS — N18.32 STAGE 3B CHRONIC KIDNEY DISEASE (CKD) (H): Primary | ICD-10-CM

## 2024-12-05 DIAGNOSIS — N18.4 ANEMIA OF CHRONIC RENAL FAILURE, STAGE 4 (SEVERE) (H): ICD-10-CM

## 2024-12-05 DIAGNOSIS — N18.4 CKD (CHRONIC KIDNEY DISEASE) STAGE 4, GFR 15-29 ML/MIN (H): ICD-10-CM

## 2024-12-05 DIAGNOSIS — Z94.1 HEART REPLACED BY TRANSPLANT (H): ICD-10-CM

## 2024-12-05 DIAGNOSIS — D84.9 IMMUNOSUPPRESSION (H): ICD-10-CM

## 2024-12-05 DIAGNOSIS — N18.32 STAGE 3B CHRONIC KIDNEY DISEASE (CKD) (H): ICD-10-CM

## 2024-12-05 DIAGNOSIS — Z79.4 TYPE 2 DIABETES MELLITUS WITH DIABETIC NEPHROPATHY, WITH LONG-TERM CURRENT USE OF INSULIN (H): ICD-10-CM

## 2024-12-05 LAB
ALBUMIN MFR UR ELPH: 43.9 MG/DL
ALBUMIN SERPL BCG-MCNC: 4 G/DL (ref 3.5–5.2)
ALBUMIN UR-MCNC: 50 MG/DL
ANION GAP SERPL CALCULATED.3IONS-SCNC: 10 MMOL/L (ref 7–15)
APPEARANCE UR: CLEAR
BILIRUB UR QL STRIP: NEGATIVE
BUN SERPL-MCNC: 30.5 MG/DL (ref 8–23)
CALCIUM SERPL-MCNC: 9.5 MG/DL (ref 8.8–10.4)
CHLORIDE SERPL-SCNC: 99 MMOL/L (ref 98–107)
COLOR UR AUTO: ABNORMAL
CREAT SERPL-MCNC: 2.35 MG/DL (ref 0.67–1.17)
CREAT UR-MCNC: 100 MG/DL
CREAT UR-MCNC: 100 MG/DL
EGFRCR SERPLBLD CKD-EPI 2021: 29 ML/MIN/1.73M2
ERYTHROCYTE [DISTWIDTH] IN BLOOD BY AUTOMATED COUNT: 15 % (ref 10–15)
EST. AVERAGE GLUCOSE BLD GHB EST-MCNC: 157 MG/DL
GLUCOSE SERPL-MCNC: 103 MG/DL (ref 70–99)
GLUCOSE UR STRIP-MCNC: >=1000 MG/DL
HBA1C MFR BLD: 7.1 %
HCO3 SERPL-SCNC: 28 MMOL/L (ref 22–29)
HCT VFR BLD AUTO: 33.2 % (ref 40–53)
HGB BLD-MCNC: 10.5 G/DL (ref 13.3–17.7)
HGB UR QL STRIP: NEGATIVE
KETONES UR STRIP-MCNC: NEGATIVE MG/DL
LEUKOCYTE ESTERASE UR QL STRIP: NEGATIVE
MCH RBC QN AUTO: 28.2 PG (ref 26.5–33)
MCHC RBC AUTO-ENTMCNC: 31.6 G/DL (ref 31.5–36.5)
MCV RBC AUTO: 89 FL (ref 78–100)
MICROALBUMIN UR-MCNC: 183 MG/L
MICROALBUMIN/CREAT UR: 183 MG/G CR (ref 0–17)
NITRATE UR QL: NEGATIVE
PH UR STRIP: 6.5 [PH] (ref 5–7)
PHOSPHATE SERPL-MCNC: 3.7 MG/DL (ref 2.5–4.5)
PLATELET # BLD AUTO: 230 10E3/UL (ref 150–450)
POTASSIUM SERPL-SCNC: 4.4 MMOL/L (ref 3.4–5.3)
PROT/CREAT 24H UR: 0.44 MG/MG CR (ref 0–0.2)
RBC # BLD AUTO: 3.72 10E6/UL (ref 4.4–5.9)
RBC URINE: 2 /HPF
SODIUM SERPL-SCNC: 137 MMOL/L (ref 135–145)
SP GR UR STRIP: 1.02 (ref 1–1.03)
SQUAMOUS EPITHELIAL: <1 /HPF
UROBILINOGEN UR STRIP-MCNC: NORMAL MG/DL
WBC # BLD AUTO: 7.7 10E3/UL (ref 4–11)
WBC URINE: 1 /HPF

## 2024-12-05 PROCEDURE — 85027 COMPLETE CBC AUTOMATED: CPT | Performed by: PATHOLOGY

## 2024-12-05 PROCEDURE — 81001 URINALYSIS AUTO W/SCOPE: CPT | Performed by: PATHOLOGY

## 2024-12-05 PROCEDURE — 83036 HEMOGLOBIN GLYCOSYLATED A1C: CPT | Performed by: PHYSICIAN ASSISTANT

## 2024-12-05 PROCEDURE — 83540 ASSAY OF IRON: CPT | Performed by: PATHOLOGY

## 2024-12-05 PROCEDURE — 80069 RENAL FUNCTION PANEL: CPT | Performed by: PATHOLOGY

## 2024-12-05 PROCEDURE — 82043 UR ALBUMIN QUANTITATIVE: CPT | Performed by: PHYSICIAN ASSISTANT

## 2024-12-05 PROCEDURE — G0463 HOSPITAL OUTPT CLINIC VISIT: HCPCS | Performed by: PHYSICIAN ASSISTANT

## 2024-12-05 PROCEDURE — 82728 ASSAY OF FERRITIN: CPT | Performed by: PATHOLOGY

## 2024-12-05 PROCEDURE — 36415 COLL VENOUS BLD VENIPUNCTURE: CPT | Performed by: PATHOLOGY

## 2024-12-05 PROCEDURE — 84156 ASSAY OF PROTEIN URINE: CPT | Performed by: PATHOLOGY

## 2024-12-05 PROCEDURE — 83550 IRON BINDING TEST: CPT | Performed by: PATHOLOGY

## 2024-12-05 PROCEDURE — 82570 ASSAY OF URINE CREATININE: CPT | Performed by: PHYSICIAN ASSISTANT

## 2024-12-05 PROCEDURE — 99000 SPECIMEN HANDLING OFFICE-LAB: CPT | Performed by: PATHOLOGY

## 2024-12-05 ASSESSMENT — PAIN SCALES - GENERAL: PAINLEVEL_OUTOF10: EXTREME PAIN (8)

## 2024-12-05 NOTE — PROGRESS NOTES
Nephrology Progress Note  12/5/2024    Assessment and Plan:  69 year old male with history of CAD s/p CABG with ICM s/p OHT in 2020, CKD with baseline Scr 1.2-1.7 before transplant, up to 2.2 since Fall 2022, diabetes for 20 years, who presents for evaluation.  His Scr is near 2.3, up from 1.8 in Fall 2023.    # CKD:  He has long standing CKD, baseline 1.2-1.7 before transplant, and was relatively stable, on combination of mycophenolate and sirolimus  -up to 2.1-2.3 in recent months. He was started on lisinopril in October 2022 for hypertension which may have a hemodynamic effect to explain this as well as jardiance  - Scr 2.3, eGFR 29  - he has had proteinuria, up to 1800mg/g albumin in 2014, which improved and now has low grade albuminuria.  - sirolimus can cause proteinuria, no longer taking TAC  - on lisinopril and furosemide  - started empagliflozin 10 mg daily  - 7/22/24 HgbA1c 7.3, improved from 8.7  - repeat HgbA1c  - continue good hydration, low sodium diet  - avoid NSAIDS  - UACR and UPCR pending      # Hypertension: blood pressure near goal in the 130s at home, 152/71 in clinic today  - on lisinopril 5 mg once a day and furosemide 20 mg once a day.   - noted to have mild LE edema, could consider increasing lasix for better BP control  - will monitor closely for now    # Anemia in chronic renal disease:   - Hgb: 10.5, up from recently, usually in the 10-11 range.   - Iron studies: 7/22/24 iron sat 26%, vit B 12 278  check iron sat and B12    - stable, check every 6 months    # Electrolytes:   - Potassium; level: Normal  - Bicarbonate; level: Normal   stable    # Mineral Bone Disorder:   - 5/3/23 Vitamin D; level is:38 Normal, PTH 40  - 12/5/24 calcium; level is: 9.5 Normal  - recheck vit D and PTH     #Disposition: labs and follow up in 4 months with me.     Chief Complaint:  Lucian Oliveira is here for follow up for CKD management.    HPI:  He is a very pleasant male with significant cardiac  history, with MI and ICM, s/p OHT in July 2020, CKD predating transplant with baseline 1.2-1.7 before and now up to 2.1-2.3 range.  He has been doing fairly well from transplant perspective and his medications are stable.  He was started on lisinopril for his blood pressure in Fall 2022.  He is on tresbia, humalog and jardiance for diabetes and is trying to eat heathy and exercise  He is accompanied by his wife, who speaks a little bit of English.    He denies shortness of breath, he has some swelling which is manageable.  He takes his medications and has no major concerns.  He denies family history of kidney failure, but does have family history of diabetes.  He has diabetic retinopathy.    Had colonoscopy in 10/2024 but visualization poor due to inadequate prep. Recommended to repeat this. Upper endoscopy completed as well for evaluation of iron deficiency anemia. Hiatal hernia noted, no biopsy was negative.     Today:   He feels well overall. He has no n/v/d.    History of admission with diarrhea and DAYA. Scr on admission was 5.77, He did not require dialysis. Most recent Scr was 2.3.   His BP at home has been near 130s  He has no further dizziness.  He had colonoscopy in October but inadequate prep noted so he will have to repeat this.  He is thinking of going to [place] to visit his last remaining uncle after the holidays and will be there for 3 months.      Renal History:   Kidney Disease and Medical Hx:  h/o HTN: Yes   and h/o DM: Yes     ROS:   A comprehensive review of systems was obtained and negative, except as noted in the HPI or PMH.    Active Medical Problems:  Patient Active Problem List   Diagnosis    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 (H)    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)    Hyperlipidemia LDL goal <100    Hypertension goal BP (blood pressure) < 140/90    CHF (NYHA class II, ACC/AHA stage C) (H)    CKD (chronic  kidney disease) stage 3, GFR 30-59 ml/min (H)    Automatic implantable cardioverter-defibrillator - [employer] single lead ICD, Not Dependent    Chronic systolic heart failure (H)    Proteinuria    Vitamin D deficiency    OA (osteoarthritis) of knee    Chondromalacia of patella, unspecified laterality    Pain in joint of left shoulder    Tinnitus    Ptosis of right eyelid    Secondary renal hyperparathyroidism (H)    Cortical cataract of both eyes    Moderate nonproliferative diabetic retinopathy, without macular edema, associated with type 2 diabetes mellitus    CHANTEL (obstructive sleep apnea)- severe (AHI 30)    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, unspecified whether long term insulin use (H)    Nuclear cataract, nonsenile    Coronary artery disease involving native coronary artery of native heart without angina pectoris    Status post coronary angiogram    Dental caries    Heart transplant, orthotopic, status (H)    Heart replaced by transplant (H)    Sepsis (H)    Need for prophylactic antibiotic    Traction detachment of left retina    Immunodeficiency, unspecified (H)    CKD (chronic kidney disease) stage 4, GFR 15-29 ml/min (H)    HIT (heparin-induced thrombocytopenia) (H)    Atrial flutter, unspecified type (H)    Acute embolism and thrombosis of right axillary vein (H)    Nuclear senile cataract of right eye    Lipid screening    Prostate cancer screening    Type 2 diabetes mellitus with diabetic nephropathy, with long-term current use of insulin (H)    Acute renal failure superimposed on chronic kidney disease, unspecified acute renal failure type, unspecified CKD stage (H)    Anemia of chronic renal failure, stage 4 (severe) (H)    Anemia, unspecified type    Dyslipidemia    Intermediate coronary syndrome (H)    E. coli colitis    Norovirus     PMH:   Medical record was reviewed and PMH was discussed with patient and noted below.  Past Medical  History:   Diagnosis Date    CAD (coronary artery disease)     CHF (congestive heart failure) (H)     CKD (chronic kidney disease), stage III (H)     Cortical cataract of both eyes     Diabetes (H)     E. coli colitis 10/12/2023    Hyperlipidemia     Hypertension     Infection due to Streptococcus mitis group 09/23/2020    Ischemic cardiomyopathy     Norovirus 10/12/2023    Obesity     CHANTEL (obstructive sleep apnea)     occas cpap    CHANTEL (obstructive sleep apnea)- severe (AHI 30)     Osteoarthritis      PSH:   Past Surgical History:   Procedure Laterality Date    CATARACT IOL, RT/LT      COLONOSCOPY N/A 8/7/2019    Procedure: COLONOSCOPY, WITH POLYPECTOMY AND BIOPSY;  Surgeon: Chauncey Morataya MD;  [facility]    COLONOSCOPY N/A 5/12/2023    Procedure: Colonoscopy;  Surgeon: Mariano Velasquez MD;  [facility]    COLONOSCOPY N/A 10/8/2024    Procedure: Colonoscopy;  Surgeon: Rip Bryan MD;  [facility]    CV ANGIOGRAM CORONARY GRAFT N/A 7/2/2020    Procedure: Angiogram Coronary Graft;  Surgeon: Alex Lantigua MD;  Location:  [facility]    CV CORONARY ANGIOGRAM N/A 7/2/2020    Procedure: CV CORONARY ANGIOGRAM;  Surgeon: Alex Lantigua MD;  [facility]    CV CORONARY ANGIOGRAM N/A 7/20/2021    Procedure: CV CORONARY ANGIOGRAM;  Surgeon: Raimundo Hudson MD;  Location:  [facility]    CV CORONARY ANGIOGRAM N/A 9/12/2022    Procedure: Coronary Angiogram- BIPLANE;  Surgeon: Raimundo Hudson MD;  Location:  [facility]    CV CORONARY ANGIOGRAM N/A 7/17/2023    Procedure: Coronary Angiogram;  Surgeon: Alex Lantigua MD;  [facility]    CV HEART BIOPSY N/A 7/27/2020    Procedure: Heart Biopsy;  Surgeon: Mario Burr MD;  Location:  [facility]    CV HEART BIOPSY N/A 8/3/2020    Procedure: CV HEART BIOPSY;  Surgeon: Rhina  MD Alex;  [facility]    CV HEART BIOPSY N/A 8/10/2020    Procedure: CV HEART BIOPSY;  Surgeon: Mario Burr MD;  [facility]    CV HEART BIOPSY N/A 8/17/2020    Procedure: CV HEART BIOPSY;  Surgeon: Raimundo Hudson MD;  [facility]    CV HEART BIOPSY N/A 8/31/2020    Procedure: CV HEART BIOPSY;  Surgeon: Moises Santos MD;  [facility]    CV HEART BIOPSY N/A 9/14/2020    Procedure: CV HEART BIOPSY;  Surgeon: Raimundo Hudson MD;  [facility]    CV HEART BIOPSY N/A 9/28/2020    Procedure: CV HEART BIOPSY;  Surgeon: Raimundo Hudson MD;  [facility]    CV HEART BIOPSY N/A 10/12/2020    Procedure: CV HEART BIOPSY;  Surgeon: John Stuart MD;  [facility]    CV HEART BIOPSY N/A 11/10/2020    Procedure: CV HEART BIOPSY;  Surgeon: John Stuart MD;  [facility]    CV HEART BIOPSY N/A 12/7/2020    Procedure: CV HEART BIOPSY;  Surgeon: Raimundo Hudson MD;  [facility]    CV HEART BIOPSY N/A 1/5/2021    Procedure: CV HEART BIOPSY;  Surgeon: Raimundo Hudson MD;  [facility]    CV HEART BIOPSY N/A 7/20/2021    Procedure: CV HEART BIOPSY;  Surgeon: Raimundo Hudson MD;  [facility]    CV HEART BIOPSY N/A 9/28/2021    Procedure: CV HEART BIOPSY;  Surgeon: Raimundo Hudson MD;  [facility]    CV HEART BIOPSY N/A 9/12/2022    Procedure: Heart Biopsy;  Surgeon: Raimundo Hudson MD;  [facility]    CV HEART BIOPSY N/A 7/17/2023    Procedure: Heart Biopsy;  Surgeon: Alex Lantigua MD;  Location:  [facility]    CV INTRA AORTIC BALLOON N/A 7/14/2020    Procedure: RHC WITH LEAVE  IN [place]/IABP;  Surgeon: Sonny Saleh MD;  [facility]    CV INTRAVASULAR ULTRASOUND N/A 9/12/2022    Procedure: Intravascular Ultrasound;  Surgeon: Raimundo Hudson MD;  [facility]    CV RIGHT HEART CATH MEASUREMENTS RECORDED N/A 3/25/2019    Procedure: CV RIGHT HEART CATH;  Surgeon: Moises Santos MD;  [facility]    CV RIGHT HEART CATH MEASUREMENTS RECORDED N/A 7/10/2019    Procedure: CV RIGHT HEART CATH;  Surgeon: Jak Mccabe MD;  [facility]    CV RIGHT HEART CATH MEASUREMENTS RECORDED N/A 7/8/2020    Procedure: Right Heart Cath with Leave In [place] already has ICU load;  Surgeon: Jak Mccabe MD;  [facility]    CV RIGHT HEART CATH MEASUREMENTS RECORDED N/A 7/14/2020    Procedure: CV RIGHT HEART CATH;  Surgeon: Sonny Saleh MD;  [facility]    CV RIGHT HEART CATH MEASUREMENTS RECORDED N/A 7/2/2020    Procedure: CV RIGHT HEART CATH;  Surgeon: Alex Lantigua MD;  [facility]    CV RIGHT HEART CATH MEASUREMENTS RECORDED N/A 7/27/2020    Procedure: Right Heart Cath;  Surgeon: Mario Burr MD;  Location:  [facility]    CV RIGHT HEART CATH MEASUREMENTS RECORDED N/A 8/3/2020    Procedure: Right Heart Cath;  Surgeon: Alex Lantigua MD;  [facility]    CV RIGHT HEART CATH MEASUREMENTS RECORDED N/A 8/10/2020    Procedure: CV RIGHT HEART CATH;  Surgeon: Mario Burr MD;  Location:  [facility]    CV RIGHT HEART CATH MEASUREMENTS RECORDED N/A 8/17/2020    Procedure: CV RIGHT HEART CATH;  Surgeon: Raimundo Hudson MD;  Location:  [facility]    CV RIGHT HEART CATH MEASUREMENTS RECORDED N/A 8/31/2020    Procedure: CV RIGHT HEART CATH;  Surgeon: Moises Santos MD;  [facility]    CV RIGHT  HEART CATH MEASUREMENTS RECORDED N/A 9/14/2020    Procedure: CV RIGHT HEART CATH;  Surgeon: Raimundo Hudson MD;  Location:  [facility]    CV RIGHT HEART CATH MEASUREMENTS RECORDED N/A 9/28/2020    Procedure: CV RIGHT HEART CATH;  Surgeon: Raimundo Hudson MD;  Location:  [facility]    CV RIGHT HEART CATH MEASUREMENTS RECORDED N/A 10/12/2020    Procedure: CV RIGHT HEART CATH;  Surgeon: John Stuart MD;  Location:  [facility]    CV RIGHT HEART CATH MEASUREMENTS RECORDED N/A 11/10/2020    Procedure: CV RIGHT HEART CATH;  Surgeon: John Stuart MD;  Location:  [facility]    CV RIGHT HEART CATH MEASUREMENTS RECORDED N/A 12/7/2020    Procedure: CV RIGHT HEART CATH;  Surgeon: Raimundo Hudson MD;  Location:  [facility]    CV RIGHT HEART CATH MEASUREMENTS RECORDED N/A 1/5/2021    Procedure: CV RIGHT HEART CATH;  Surgeon: Raimundo Hudson MD;  Location:  [facility]    CV RIGHT HEART CATH MEASUREMENTS RECORDED N/A 7/20/2021    Procedure: CV RIGHT HEART CATH;  Surgeon: Raimundo Hudson MD;  Location:  [facility]    CV RIGHT HEART CATH MEASUREMENTS RECORDED N/A 9/28/2021    Procedure: CV RIGHT HEART CATH;  Surgeon: Raimundo Hudson MD;  Location:  [facility]    CV RIGHT HEART CATH MEASUREMENTS RECORDED N/A 9/12/2022    Procedure: Right Heart Catheterization;  Surgeon: Raimundo Hudson MD;  Location:  [facility]    CV RIGHT HEART CATH MEASUREMENTS RECORDED N/A 7/17/2023    Procedure: Right Heart Catheterization;  Surgeon: Alex Lantigua MD;  Location:  [facility]    ESOPHAGOSCOPY, GASTROSCOPY, DUODENOSCOPY (EGD), COMBINED N/A 10/8/2024    Procedure: ESOPHAGOGASTRODUODENOSCOPY, WITH BIOPSY;  Surgeon: Rip Bryan MD;  [facility]    EXTRACTION(S) DENTAL Left 7/13/2020     Procedure: EXTRACTION, TOOTH #11, 12, 13, 15, and 29;  Surgeon: Monica Chao DDS;  [facility]    IMPLANT AUTOMATIC IMPLANTABLE CARDIOVERTER DEFIBRILLATOR      INCISION AND DRAINAGE STERNUM W/ IRRIGATION SYSTEM, COMBINED N/A 9/23/2020    Procedure: INCISION AND DRAINAGE, LEFT SUPRACLAVICULAR WOUND INFECTION AND ABDOMENN WOUND.;  Surgeon: Ran Huertas MD;  [facility]    INSERT INTRAAORTIC BALLOON PUMP N/A 7/16/2020    Procedure: Subclavian Intra-Aortic Balloon Pump Placement;  Surgeon: Allan Sparrow MD;  [facility]    IRRIGATION AND DEBRIDEMENT CHEST WASHOUT, COMBINED N/A 9/28/2020    Procedure: IRRIGATION AND DEBRIDEMENT OF LEFT UPPER CHEST WOUND.;  Surgeon: Ran Huertas MD;  [facility]    PHACOEMULSIFICATION CLEAR CORNEA WITH STANDARD INTRAOCULAR LENS IMPLANT Right 2/6/2023    Procedure: RIGHT EYE PHACOEMULSIFICATION, CATARACT, WITH INTRAOCULAR LENS IMPLANT with trypan blue;  Surgeon: Triny Bunch MD;  [facility]    PHACOEMULSIFICATION CLEAR CORNEA WITH STANDARD IOL, VITRECTOMY PARSPLANA 25 GAGUE, COMBINED Left 12/10/2019    Procedure: PHACOEMULSIFICATION, CATARACT, CLEAR CORNEAL INCISION APPROACH, W STD INTRAOCULAR LENS IMPLANT INSERT + VITRECTOMY BY PARS PLANA  USING 25-GAUGE INSTRUMENTS. ENDOLASER, INFUSION OF 20% SF6 GAS;  Surgeon: Triny Bunch MD;  [facility]    PICC DOUBLE LUMEN PLACEMENT Left 07/28/2020    5Fr - 42cm, Basilic vein, mid SVC    PICC INSERTION Right 07/11/2020    basilic 44 cm total     TRANSPLANT HEART RECIPIENT N/A 7/19/2020    Procedure: REDO MEDIAN STERNOTOMY, TRANSPLANT, ORTHOTOPIC HEART, RECIPIENT, ON PUMP OXYGENATOR, REMOVAL OF CARDIAC DEFIBRILLATOR AND LEAD;  Surgeon: Griselli, Massimo, MD;  [facility]    VITRECTOMY, PARS PLANA APPROACH, USING 27-GAUGE INSTRUMENTS Left 12/21/2020    Procedure: 27 gauge pars plana vitectomy, membrane peel, perfluoroctane liquid (PFO), retinectomy, endolaser, Silicone Oil 1000 cs;   Surgeon: Triny Bunch MD;  Location: [facility] CABG, ARTERY-VEIN, THREE  02/2008       Family Hx:   Family History   Problem Relation Age of Onset    Diabetes Sister     Diabetes Sister     Diabetes Brother     Macular Degeneration No family hx of     Glaucoma No family hx of     Myocardial Infarction No family hx of     Kidney Disease No family hx of     Anesthesia Reaction No family hx of     Bleeding Disorder No family hx of     Venous thrombosis No family hx of      Personal Hx:   Social History     Tobacco Use    Smoking status: Never    Smokeless tobacco: Never    Tobacco comments:     Never smoked; non-smoking household   Substance Use Topics    Alcohol use: No     Alcohol/week: 0.0 standard drinks of alcohol       Allergies:  Allergies   Allergen Reactions    Heparin Heparin Induced Thrombocytopenia     HIT screen sent 7/18/2020. CORRINE confirmed positive       Medications:  Current Outpatient Medications   Medication Sig Dispense Refill    acetaminophen (TYLENOL) 325 MG tablet Take 3 tablets (975 mg) by mouth every 8 hours as needed for mild pain 60 tablet 3    Alcohol Swabs PADS Use to swab the area of the injection or sergio as directed   Per insurance coverage 100 each 0    aspirin (ASA) 81 MG chewable tablet Take 1 tablet (81 mg) by mouth daily (Patient taking differently: Take 81 mg by mouth every morning.) 120 tablet 0    bisacodyl (DULCOLAX) 5 MG EC tablet Two days prior to exam take two (2) tablets at 4pm. One day prior to exam take two (2) tablets at 4pm 4 tablet 0    blood glucose (NO BRAND SPECIFIED) test strip Use to test blood sugar 4 times daily or as directed. 400 strip 3    blood glucose monitoring (ACCU-CHEK FELIPE SMARTVIEW) meter device kit Use to test blood sugar 3-4 times daily, as directed. 1 kit 0    blood glucose monitoring (SOFTCLIX) lancets 1 each 4 times daily Use to test blood sugars 2 times daily. 400 each 8    calcium carbonate-vitamin D (CALTRATE) 600-10 MG-MCG per  tablet TAKE ONE TABLET BY MOUTH TWICE A DAY WITH MEALS 180 tablet 3    Continuous Glucose Sensor (DEXCOM G7 SENSOR) [facility] [place] CADA 10 MCINTYRE 10 each 2    empagliflozin (JARDIANCE) 10 MG TABS tablet Take 1 tablet (10 mg) by mouth daily. (Patient taking differently: Take 10 mg by mouth every morning.) 90 tablet 0    ferrous sulfate (FEROSUL) 325 (65 Fe) MG tablet Take 1 tablet (325 mg) by mouth 2 times daily (with meals) (Patient taking differently: Take 325 mg by mouth daily (with breakfast).) 180 tablet 3    furosemide (LASIX) 20 MG tablet Take 1 tablet (20 mg) by mouth daily. 100 tablet 2    HUMALOG KWIKPEN 100 UNIT/ML soln Give 10 units with breakfast, 10 units with lunch,  and 5 units with dinner.  Average daily use is 25 units 90 mL 3    Injection Device for insulin (CEQUR SIMPLICITY 2U) KALI 1 each See Admin Instructions. Change patch every 2-3 days 40 each 4    Injection Device for Insulin (CEQUR SIMPLICITY ) MISC 1 each See Admin Instructions. Use with patches. Change patch every 2-3 days 1 each 1    insulin degludec (TRESIBA) 200 UNIT/ML pen Inject 60 Units Subcutaneous every morning Order pen needles too 90 mL 11    insulin pen needle (32G X 4 MM) 32G X 4 MM miscellaneous Use 5-6 pen needles daily or as directed. 600 each 2    ketorolac (ACULAR) 0.5 % ophthalmic solution Place 1 drop into the right eye 2 times daily 10 mL 3    latanoprost (XALATAN) 0.005 % ophthalmic solution Place 1 drop into both eyes daily.      lisinopril (ZESTRIL) 5 MG tablet Take 1 tablet (5 mg) by mouth daily. 100 tablet 2    magnesium oxide 400 MG tablet Take 1 tablet (400 mg) by mouth 2 times daily 180 tablet 3    mycophenolate (GENERIC EQUIVALENT) 500 MG tablet Take 3 tablets (1,500 mg) by mouth 2 times daily 180 tablet 11    omega-3 acid ethyl esters (LOVAZA) 1 g capsule TOME 1 CAPSULA POR LA BOCA CADA LISA (Patient taking differently: Take 1 g by mouth every morning.) 100 capsule 2    polyethylene glycol (GOLYTELY) 236 g  "suspension Two days before procedure at 5PM fill first container with water. Mix and drink an 8 oz glass every 15 minutes until HALF of the container is gone. Place the remainder in the refrigerator. One day before procedure at 5PM drink second half of bowel prep. Drink an 8 oz glass every 15 minutes until it is gone. Day of procedure 6 hours before arrival time fill the 2nd container with water. Mix and drink an 8 oz glass every 15 minutes until HALF of the container is gone. Discard the remaining solution. 8000 mL 0    rosuvastatin (CRESTOR) 20 MG tablet Take 1 tablet (20 mg) by mouth daily. 100 tablet 2    senna-docusate (SENOKOT-S/PERICOLACE) 8.6-50 MG tablet Take 1 tablet by mouth 2 times daily as needed (hold for loose stools) 60 tablet 3    sirolimus (GENERIC EQUIVALENT) 0.5 MG tablet Take 6 tablets (3 mg) by mouth daily (Patient taking differently: Take 3 mg by mouth daily (with lunch).) 180 tablet 11    sulfamethoxazole-trimethoprim (BACTRIM) 400-80 MG tablet Take 1 tablet by mouth three times a week. 45 tablet 3    tamsulosin (FLOMAX) 0.4 MG capsule Take 1 capsule (0.4 mg) by mouth every morning. 90 capsule 3     Current Facility-Administered Medications   Medication Dose Route Frequency Provider Last Rate Last Admin    bevacizumab (AVASTIN) intravitreal inj 1.25 mg  1.25 mg Intravitreal Q28 Days Triny Bunch MD   1.25 mg at 10/30/24 0905      Vitals:  BP (!) 152/71   Pulse 84   Ht 1.651 m (5' 5\")   Wt 81.6 kg (180 lb)   BMI 29.95 kg/m      Exam:  GENERAL APPEARANCE: alert and no distress  RESP: lungs clear to auscultation - no rales, rhonchi or wheezes  CV: regular rhythm, normal rate  EDEMA: mild LE edema bilaterally  SKIN: no visible rash    LABS:   CMP  Recent Labs   Lab Test 10/08/24  0937 07/22/24  0711 04/16/24  1033 01/15/24  1511 11/16/23  2301 11/16/23  1914 07/19/21  0950 05/11/21  0905 03/02/21  0818 01/14/21  0811 01/05/21  0816   NA  --  141 138 138  --  139   < > 141 142 137 " 139   POTASSIUM  --  3.6 4.8 4.5  --  4.1   < > 4.0 4.2 4.8 4.3   CHLORIDE  --  105 104 100  --  105   < > 107 108 106 107   CO2  --  26 23 26  --  22   < > 27 27 28 25   ANIONGAP  --  10 11 12  --  12   < > 6 7 4 7   * 99 97 221*   < > 239*   < > 98 110* 145* 137*   BUN  --  34.8* 34.1* 22.1  --  16.7   < > 34* 36* 28 28   CR  --  2.25* 2.22* 2.10*  --  1.88*   < > 1.84* 1.83* 1.69* 1.68*   GFRESTIMATED  --  31* 31* 33*  --  38*   < > 37* 37* 41* 41*   GFRESTBLACK  --   --   --   --   --   --   --  43* 43* 48* 48*   BRYANNA  --  8.9 8.8 8.7*  --  8.5*   < > 9.0 9.2 9.2 9.2    < > = values in this interval not displayed.     Recent Labs   Lab Test 07/22/24  0711 04/16/24  1033 11/16/23  1914 10/25/23  1005 10/11/23  1128   BILITOTAL 0.3 0.3 0.2 0.2 0.2   ALKPHOS 119 110 129 138* 124   ALT 8 9  --  13 20   AST 15 15  --  17 22     CBC  Recent Labs   Lab Test 12/05/24  1159 07/22/24  0711 04/29/24  0736 04/16/24  1033 11/17/23  1359 11/17/23  0556   HGB 10.5* 10.7* 10.2* 10.2*   < > 6.9*  6.9*   WBC 7.7 6.6  --  3.7*  --  3.8*   RBC 3.72* 3.71*  --  3.51*  --  2.47*   HCT 33.2* 33.8* 30.9* 31.7*   < > 22.0*   MCV 89 91  --  90  --  89   MCH 28.2 28.8  --  29.1  --  27.9   MCHC 31.6 31.7  --  32.2  --  31.4*   RDW 15.0 14.0  --  14.1  --  15.8*    219  --  188  --  139*    < > = values in this interval not displayed.     URINE STUDIES  Recent Labs   Lab Test 10/12/23  0258 07/28/23  1226 09/28/21  0926 09/25/20  1241   COLOR Light Yellow Light Yellow Light Yellow Yellow   APPEARANCE Clear Clear Clear Clear   URINEGLC Negative 100* 50* 70*   URINEBILI Negative Negative Negative Negative   URINEKETONE Negative Negative Negative Negative   SG 1.015 1.014 1.016 1.018   UBLD Negative Negative Negative Negative   URINEPH 5.0 5.5 6.5 5.5   PROTEIN 20* Negative 10* 10*   NITRITE Negative Negative Negative Negative   LEUKEST Negative Negative Negative Negative   RBCU 0 <1 0 0   WBCU 3 <1 0 0     Recent Labs   Lab  Test 03/14/22  1408 10/03/18  0725   [facility] 0.29* 0.06     PTH  Recent Labs   Lab Test 01/15/24  1511 05/03/23  0844 08/16/18  0834   PTHI 70* 40 180*     IRON STUDIES  Recent Labs   Lab Test 07/22/24  0711 04/29/24  0736 04/01/24  1125   IRON 52* 52* 69   * 203* 228*   IRONSAT 26 26 30   SEVERIANO 278 284 398       Hiral Huffman PA-C    Visit length 15 minutes. An additional 20 minutes were spent on date of service in chart review, documentation, and other activities as noted.

## 2024-12-05 NOTE — NURSING NOTE
"Chief Complaint   Patient presents with    RECHECK     BP (!) 152/71   Pulse 84   Ht 1.651 m (5' 5\")   Wt 81.6 kg (180 lb)   BMI 29.95 kg/m    Hiral Muniz MA on 12/5/2024 at 12:22 PM    "

## 2024-12-05 NOTE — LETTER
12/5/2024       RE: Lucian Oliveira  [address] [facility] [address]     Dear Colleague,    Thank you for referring your patient, Lucian Oliveira, to the [facility]PHROLOG[facility] [place] at [facility]. Please see a copy of my visit note below.    Nephrology Progress Note  12/5/2024    Assessment and Plan:  69 year old male with history of CAD s/p CABG with ICM s/p OHT in 2020, CKD with baseline Scr 1.2-1.7 before transplant, up to 2.2 since Fall 2022, diabetes for 20 years, who presents for evaluation.  His Scr is near 2.3, up from 1.8 in Fall 2023.    # CKD:  He has long standing CKD, baseline 1.2-1.7 before transplant, and was relatively stable, on combination of mycophenolate and sirolimus  -up to 2.1-2.3 in recent months. He was started on lisinopril in October 2022 for hypertension which may have a hemodynamic effect to explain this as well as jardiance  - Scr 2.3, eGFR 29  - he has had proteinuria, up to 1800mg/g albumin in 2014, which improved and now has low grade albuminuria.  - sirolimus can cause proteinuria, no longer taking TAC  - on lisinopril and furosemide  - started empagliflozin 10 mg daily  - 7/22/24 HgbA1c 7.3, improved from 8.7  - repeat HgbA1c  - continue good hydration, low sodium diet  - avoid NSAIDS  - UACR and UPCR pending      # Hypertension: blood pressure near goal in the 130s at home, 152/71 in clinic today  - on lisinopril 5 mg once a day and furosemide 20 mg once a day.   - noted to have mild LE edema, could consider increasing lasix for better BP control  - will monitor closely for now    # Anemia in chronic renal disease:   - Hgb: 10.5, up from recently, usually in the 10-11 range.   - Iron studies: 7/22/24 iron sat 26%, vit B 12 278  check iron sat and B12    - stable, check every 6 months    # Electrolytes:   - Potassium; level: Normal  - Bicarbonate; level: Normal   stable    # Mineral Bone  Disorder:   - 5/3/23 Vitamin D; level is:38 Normal, PTH 40  - 12/5/24 calcium; level is: 9.5 Normal  - recheck vit D and PTH     #Disposition: labs and follow up in 4 months with me.     Chief Complaint:  Lucian Oliveira is here for follow up for CKD management.    HPI:  He is a very pleasant male with significant cardiac history, with MI and ICM, s/p OHT in July 2020, CKD predating transplant with baseline 1.2-1.7 before and now up to 2.1-2.3 range.  He has been doing fairly well from transplant perspective and his medications are stable.  He was started on lisinopril for his blood pressure in Fall 2022.  He is on tresbia, humalog and jardiance for diabetes and is trying to eat heathy and exercise  He is accompanied by his wife, who speaks a little bit of English.    He denies shortness of breath, he has some swelling which is manageable.  He takes his medications and has no major concerns.  He denies family history of kidney failure, but does have family history of diabetes.  He has diabetic retinopathy.    Had colonoscopy in 10/2024 but visualization poor due to inadequate prep. Recommended to repeat this. Upper endoscopy completed as well for evaluation of iron deficiency anemia. Hiatal hernia noted, no biopsy was negative.     Today:   He feels well overall. He has no n/v/d.    History of admission with diarrhea and DAYA. Scr on admission was 5.77, He did not require dialysis. Most recent Scr was 2.3.   His BP at home has been near 130s  He has no further dizziness.  He had colonoscopy in October but inadequate prep noted so he will have to repeat this.  He is thinking of going to [place] to visit his last remaining uncle after the holidays and will be there for 3 months.      Renal History:   Kidney Disease and Medical Hx:  h/o HTN: Yes   and h/o DM: Yes     ROS:   A comprehensive review of systems was obtained and negative, except as noted in the HPI or PMH.    Active Medical Problems:  Patient Active  Problem List   Diagnosis     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 (H)     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)     Hyperlipidemia LDL goal <100     Hypertension goal BP (blood pressure) < 140/90     CHF (NYHA class II, ACC/AHA stage C) (H)     CKD (chronic kidney disease) stage 3, GFR 30-59 ml/min (H)     Automatic implantable cardioverter-defibrillator - [employer] single lead ICD, Not Dependent     Chronic systolic heart failure (H)     Proteinuria     Vitamin D deficiency     OA (osteoarthritis) of knee     Chondromalacia of patella, unspecified laterality     Pain in joint of left shoulder     Tinnitus     Ptosis of right eyelid     Secondary renal hyperparathyroidism (H)     Cortical cataract of both eyes     Moderate nonproliferative diabetic retinopathy, without macular edema, associated with type 2 diabetes mellitus     CHANTEL (obstructive sleep apnea)- severe (AHI 30)     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, unspecified whether long term insulin use (H)     Nuclear cataract, nonsenile     Coronary artery disease involving native coronary artery of native heart without angina pectoris     Status post coronary angiogram     Dental caries     Heart transplant, orthotopic, status (H)     Heart replaced by transplant (H)     Sepsis (H)     Need for prophylactic antibiotic     Traction detachment of left retina     Immunodeficiency, unspecified (H)     CKD (chronic kidney disease) stage 4, GFR 15-29 ml/min (H)     HIT (heparin-induced thrombocytopenia) (H)     Atrial flutter, unspecified type (H)     Acute embolism and thrombosis of right axillary vein (H)     Nuclear senile cataract of right eye     Lipid screening     Prostate cancer screening     Type 2 diabetes mellitus with diabetic nephropathy, with long-term current use of insulin (H)     Acute renal failure superimposed  on chronic kidney disease, unspecified acute renal failure type, unspecified CKD stage (H)     Anemia of chronic renal failure, stage 4 (severe) (H)     Anemia, unspecified type     Dyslipidemia     Intermediate coronary syndrome (H)     E. coli colitis     Norovirus     PMH:   Medical record was reviewed and PMH was discussed with patient and noted below.  Past Medical History:   Diagnosis Date     CAD (coronary artery disease)      CHF (congestive heart failure) (H)      CKD (chronic kidney disease), stage III (H)      Cortical cataract of both eyes      Diabetes (H)      E. coli colitis 10/12/2023     Hyperlipidemia      Hypertension      Infection due to Streptococcus mitis group 09/23/2020     Ischemic cardiomyopathy      Norovirus 10/12/2023     Obesity      CHANTEL (obstructive sleep apnea)     occas cpap     CHANTEL (obstructive sleep apnea)- severe (AHI 30)      Osteoarthritis      PSH:   Past Surgical History:   Procedure Laterality Date     CATARACT IOL, RT/LT       COLONOSCOPY N/A 8/7/2019    Procedure: COLONOSCOPY, WITH POLYPECTOMY AND BIOPSY;  Surgeon: Chauncey Morataya MD;  [facility]     COLONOSCOPY N/A 5/12/2023    Procedure: Colonoscopy;  Surgeon: Mariano Velasquez MD;  [facility]     COLONOSCOPY N/A 10/8/2024    Procedure: Colonoscopy;  Surgeon: Rip Bryan MD;  [facility]     CV ANGIOGRAM CORONARY GRAFT N/A 7/2/2020    Procedure: Angiogram Coronary Graft;  Surgeon: Alex Lantigua MD;  Location: [facility] CARDIAC CATH LAB     CV CORONARY ANGIOGRAM N/A 7/2/2020    Procedure: CV CORONARY ANGIOGRAM;  Surgeon: Alex Lantigua MD;  Location: [facility] CARDIAC CATH LAB     CV CORONARY ANGIOGRAM N/A 7/20/2021    Procedure: CV CORONARY ANGIOGRAM;  Surgeon: Raimundo Hudson MD;  Location: [facility] CARDIAC CATH LAB     CV CORONARY ANGIOGRAM N/A 9/12/2022    Procedure: Coronary Angiogram- BIPLANE;  Surgeon: Raimundo Hudson MD;  [facility]     CV CORONARY ANGIOGRAM N/A 7/17/2023    Procedure: Coronary Angiogram;  Surgeon: Alex Lantigua MD;  [facility]     CV HEART BIOPSY N/A 7/27/2020    Procedure: Heart Biopsy;  Surgeon: Mario Burr MD;  [facility]     CV HEART BIOPSY N/A 8/3/2020    Procedure: CV HEART BIOPSY;  Surgeon: Alex Lantigua MD;  [facility]     CV HEART BIOPSY N/A 8/10/2020    Procedure: CV HEART BIOPSY;  Surgeon: Mario Burr MD;  [facility]     CV HEART BIOPSY N/A 8/17/2020    Procedure: CV HEART BIOPSY;  Surgeon: Raimundo Hudson MD;  Location:  [facility]ART CARDIAC CATH LAB     CV HEART BIOPSY N/A 8/31/2020    Procedure: CV HEART BIOPSY;  Surgeon: Moises Santos MD;  Location:  HEART CARDIAC CATH LAB     CV HEART BIOPSY N/A 9/14/2020    Procedure: CV HEART BIOPSY;  Surgeon: Raimundo Hudson MD;  [facility]     CV HEART BIOPSY N/A 9/28/2020    Procedure: CV HEART BIOPSY;  Surgeon: Raimundo Hudson MD;  Location:  [facility] CARDIAC CATH LAB     CV HEART BIOPSY N/A 10/12/2020    Procedure: CV HEART BIOPSY;  Surgeon: John Stuart MD;  [facility]     CV HEART BIOPSY N/A 11/10/2020    Procedure: CV HEART BIOPSY;  Surgeon: John Stuart MD;  [facility]     CV HEART BIOPSY N/A 12/7/2020    Procedure: CV HEART BIOPSY;  Surgeon: Raimundo Hudson MD;  Location:  HEART CARDIAC CATH LAB     CV HEART BIOPSY N/A 1/5/2021    Procedure: CV HEART BIOPSY;  Surgeon: Raimundo Hudson MD;  Location:  HEART CARDIAC CATH LAB     CV HEART BIOPSY N/A 7/20/2021    Procedure: CV HEART BIOPSY;  Surgeon: Raimundo Hudson MD;  Location:  HEART CARDIAC CATH LAB     CV HEART BIOPSY N/A 9/28/2021    Procedure: CV HEART BIOPSY;  Surgeon: Tristan  Raimundo Padgett MD;  Location:  HEART CARDIAC CATH LAB     CV HEART BIOPSY N/A 9/12/2022    Procedure: Heart Biopsy;  Surgeon: Raimundo Hudson MD;  Location:  HEART CARDIAC CATH LAB     CV HEART BIOPSY N/A 7/17/2023    Procedure: Heart Biopsy;  Surgeon: Alex Lantigua MD;  Location:  HEART CARDIAC CATH LAB     CV INTRA AORTIC BALLOON N/A 7/14/2020    Procedure: RHC WITH LEAVE IN [place]/IABP;  Surgeon: Sonny Saleh MD;  Location:  HEART CARDIAC CATH LAB     CV INTRAVASULAR ULTRASOUND N/A 9/12/2022    Procedure: Intravascular Ultrasound;  Surgeon: Raimundo Hudson MD;  Location:  HEART CARDIAC CATH LAB     CV RIGHT HEART CATH MEASUREMENTS RECORDED N/A 3/25/2019    Procedure: CV RIGHT HEART CATH;  Surgeon: Moises Santos MD;  Location:  HEART CARDIAC CATH LAB     CV RIGHT HEART CATH MEASUREMENTS RECORDED N/A 7/10/2019    Procedure: CV RIGHT HEART CATH;  Surgeon: Jak Mccabe MD;  Location:  HEART CARDIAC CATH LAB     CV RIGHT HEART CATH MEASUREMENTS RECORDED N/A 7/8/2020    Procedure: Right Heart Cath with Leave In [place] already has ICU load;  Surgeon: Jak Mccabe MD;  Location:  HEART CARDIAC CATH LAB     CV RIGHT HEART CATH MEASUREMENTS RECORDED N/A 7/14/2020    Procedure: CV RIGHT HEART CATH;  Surgeon: Sonny Saleh MD;  Location:  HEART CARDIAC CATH LAB     CV RIGHT HEART CATH MEASUREMENTS RECORDED N/A 7/2/2020    Procedure: CV RIGHT HEART CATH;  Surgeon: Alex Lantigua MD;  Location:  HEART CARDIAC CATH LAB     CV RIGHT HEART CATH MEASUREMENTS RECORDED N/A 7/27/2020    Procedure: Right Heart Cath;  Surgeon: Mario Burr MD;  Location:  HEART CARDIAC CATH LAB     CV RIGHT HEART CATH MEASUREMENTS RECORDED N/A 8/3/2020    Procedure: Right Heart Cath;  Surgeon: Alex Lantigua MD;  Location:  HEART CARDIAC CATH LAB     CV RIGHT HEART CATH MEASUREMENTS RECORDED N/A 8/10/2020    Procedure: CV RIGHT HEART CATH;   Surgeon: Mario Burr MD;  Location:  HEART CARDIAC CATH LAB     CV RIGHT HEART CATH MEASUREMENTS RECORDED N/A 8/17/2020    Procedure: CV RIGHT HEART CATH;  Surgeon: Raimundo Hudson MD;  Location:  HEART CARDIAC CATH LAB     CV RIGHT HEART CATH MEASUREMENTS RECORDED N/A 8/31/2020    Procedure: CV RIGHT HEART CATH;  Surgeon: Moises Santos MD;  Location:  HEART CARDIAC CATH LAB     CV RIGHT HEART CATH MEASUREMENTS RECORDED N/A 9/14/2020    Procedure: CV RIGHT HEART CATH;  Surgeon: Raimundo Hudson MD;  Location:  HEART CARDIAC CATH LAB     CV RIGHT HEART CATH MEASUREMENTS RECORDED N/A 9/28/2020    Procedure: CV RIGHT HEART CATH;  Surgeon: Raimundo Hudson MD;  Location:  HEART CARDIAC CATH LAB     CV RIGHT HEART CATH MEASUREMENTS RECORDED N/A 10/12/2020    Procedure: CV RIGHT HEART CATH;  Surgeon: John Stuart MD;  Location:  HEART CARDIAC CATH LAB     CV RIGHT HEART CATH MEASUREMENTS RECORDED N/A 11/10/2020    Procedure: CV RIGHT HEART CATH;  Surgeon: John Stuart MD;  Location:  HEART CARDIAC CATH LAB     CV RIGHT HEART CATH MEASUREMENTS RECORDED N/A 12/7/2020    Procedure: CV RIGHT HEART CATH;  Surgeon: Raimundo Hudson MD;  Location:  HEART CARDIAC CATH LAB     CV RIGHT HEART CATH MEASUREMENTS RECORDED N/A 1/5/2021    Procedure: CV RIGHT HEART CATH;  Surgeon: Raimundo Hudson MD;  Location:  HEART CARDIAC CATH LAB     CV RIGHT HEART CATH MEASUREMENTS RECORDED N/A 7/20/2021    Procedure: CV RIGHT HEART CATH;  Surgeon: Raimundo Hudson MD;  Location:  HEART CARDIAC CATH LAB     CV RIGHT HEART CATH MEASUREMENTS RECORDED N/A 9/28/2021    Procedure: CV RIGHT HEART CATH;  Surgeon: Raimundo Hudson MD;  Location:  HEART CARDIAC CATH LAB     CV RIGHT HEART CATH MEASUREMENTS RECORDED N/A 9/12/2022    Procedure: Right Heart Catheterization;  Surgeon: Raimundo Hudson MD;   Location:  HEART CARDIAC CATH LAB     CV RIGHT HEART CATH MEASUREMENTS RECORDED N/A 7/17/2023    Procedure: Right Heart Catheterization;  Surgeon: Alex Lantigua MD;  Location:  HEART CARDIAC CATH LAB     ESOPHAGOSCOPY, GASTROSCOPY, DUODENOSCOPY (EGD), COMBINED N/A 10/8/2024    Procedure: ESOPHAGOGASTRODUODENOSCOPY, WITH BIOPSY;  Surgeon: Rip Bryan MD;  [facility]     EXTRACTION(S) DENTAL Left 7/13/2020    Procedure: EXTRACTION, TOOTH #11, 12, 13, 15, and 29;  Surgeon: Monica Chao DDS;  [facility]     IMPLANT AUTOMATIC IMPLANTABLE CARDIOVERTER DEFIBRILLATOR       INCISION AND DRAINAGE STERNUM W/ IRRIGATION SYSTEM, COMBINED N/A 9/23/2020    Procedure: INCISION AND DRAINAGE, LEFT SUPRACLAVICULAR WOUND INFECTION AND ABDOMENN WOUND.;  Surgeon: Ran Huertas MD;  [facility]     INSERT INTRAAORTIC BALLOON PUMP N/A 7/16/2020    Procedure: Subclavian Intra-Aortic Balloon Pump Placement;  Surgeon: Allan Sparrow MD;  [facility]     IRRIGATION AND DEBRIDEMENT CHEST WASHOUT, COMBINED N/A 9/28/2020    Procedure: IRRIGATION AND DEBRIDEMENT OF LEFT UPPER CHEST WOUND.;  Surgeon: Ran Huertas MD;  [facility]     PHACOEMULSIFICATION CLEAR CORNEA WITH STANDARD INTRAOCULAR LENS IMPLANT Right 2/6/2023    Procedure: RIGHT EYE PHACOEMULSIFICATION, CATARACT, WITH INTRAOCULAR LENS IMPLANT with trypan blue;  Surgeon: Triyn Bunch MD;  [facility]     PHACOEMULSIFICATION CLEAR CORNEA WITH STANDARD IOL, VITRECTOMY PARSPLANA 25 GAGUE, COMBINED Left 12/10/2019    Procedure: PHACOEMULSIFICATION, CATARACT, CLEAR CORNEAL INCISION APPROACH, W STD INTRAOCULAR LENS IMPLANT INSERT + VITRECTOMY BY PARS PLANA  USING 25-GAUGE INSTRUMENTS. ENDOLASER, INFUSION OF 20% SF6 GAS;  Surgeon: Triny Bunch MD;  [facility]     PICC DOUBLE LUMEN PLACEMENT Left 07/28/2020    5Fr - 42cm, Basilic vein, mid SVC     PICC INSERTION Right 07/11/2020    basilic 44 cm total      TRANSPLANT  HEART RECIPIENT N/A 7/19/2020    Procedure: REDO MEDIAN STERNOTOMY, TRANSPLANT, ORTHOTOPIC HEART, RECIPIENT, ON PUMP OXYGENATOR, REMOVAL OF CARDIAC DEFIBRILLATOR AND LEAD;  Surgeon: Griselli, Massimo, MD;  [facility]     VITRECTOMY, PARS PLANA APPROACH, USING 27-GAUGE INSTRUMENTS Left 12/21/2020    Procedure: 27 gauge pars plana vitectomy, membrane peel, perfluoroctane liquid (PFO), retinectomy, endolaser, Silicone Oil 1000 cs;  Surgeon: Triny Bunch MD;  Location:  OR     [facility] CABG, ARTERY-VEIN, THREE  02/2008       Family Hx:   Family History   Problem Relation Age of Onset     Diabetes Sister      Diabetes Sister      Diabetes Brother      Macular Degeneration No family hx of      Glaucoma No family hx of      Myocardial Infarction No family hx of      Kidney Disease No family hx of      Anesthesia Reaction No family hx of      Bleeding Disorder No family hx of      Venous thrombosis No family hx of      Personal Hx:   Social History     Tobacco Use     Smoking status: Never     Smokeless tobacco: Never     Tobacco comments:     Never smoked; non-smoking household   Substance Use Topics     Alcohol use: No     Alcohol/week: 0.0 standard drinks of alcohol       Allergies:  Allergies   Allergen Reactions     Heparin Heparin Induced Thrombocytopenia     HIT screen sent 7/18/2020. CORRINE confirmed positive       Medications:  Current Outpatient Medications   Medication Sig Dispense Refill     acetaminophen (TYLENOL) 325 MG tablet Take 3 tablets (975 mg) by mouth every 8 hours as needed for mild pain 60 tablet 3     Alcohol Swabs PADS Use to swab the area of the injection or sergio as directed   Per insurance coverage 100 each 0     aspirin (ASA) 81 MG chewable tablet Take 1 tablet (81 mg) by mouth daily (Patient taking differently: Take 81 mg by mouth every morning.) 120 tablet 0     bisacodyl (DULCOLAX) 5 MG EC tablet Two days prior to exam take two (2) tablets at 4pm. One day prior to exam take two  (2) tablets at 4pm 4 tablet 0     blood glucose (NO BRAND SPECIFIED) test strip Use to test blood sugar 4 times daily or as directed. 400 strip 3     blood glucose monitoring (ACCU-CHEK FELIPE SMARTVIEW) meter device kit Use to test blood sugar 3-4 times daily, as directed. 1 kit 0     blood glucose monitoring (SOFTCLIX) lancets 1 each 4 times daily Use to test blood sugars 2 times daily. 400 each 8     calcium carbonate-vitamin D (CALTRATE) 600-10 MG-MCG per tablet TAKE ONE TABLET BY MOUTH TWICE A DAY WITH MEALS 180 tablet 3     Continuous Glucose Sensor (DEXCOM G7 SENSOR) [facility] CADA 10 MCINTYRE 10 each 2     empagliflozin (JARDIANCE) 10 MG TABS tablet Take 1 tablet (10 mg) by mouth daily. (Patient taking differently: Take 10 mg by mouth every morning.) 90 tablet 0     ferrous sulfate (FEROSUL) 325 (65 Fe) MG tablet Take 1 tablet (325 mg) by mouth 2 times daily (with meals) (Patient taking differently: Take 325 mg by mouth daily (with breakfast).) 180 tablet 3     furosemide (LASIX) 20 MG tablet Take 1 tablet (20 mg) by mouth daily. 100 tablet 2     HUMALOG KWIKPEN 100 UNIT/ML soln Give 10 units with breakfast, 10 units with lunch,  and 5 units with dinner.  Average daily use is 25 units 90 mL 3     Injection Device for insulin (CEQUR SIMPLICITY 2U) KALI 1 each See Admin Instructions. Change patch every 2-3 days 40 each 4     Injection Device for Insulin (CEQUR SIMPLICITY ) MISC 1 each See Admin Instructions. Use with patches. Change patch every 2-3 days 1 each 1     insulin degludec (TRESIBA) 200 UNIT/ML pen Inject 60 Units Subcutaneous every morning Order pen needles too 90 mL 11     insulin pen needle (32G X 4 MM) 32G X 4 MM miscellaneous Use 5-6 pen needles daily or as directed. 600 each 2     ketorolac (ACULAR) 0.5 % ophthalmic solution Place 1 drop into the right eye 2 times daily 10 mL 3     latanoprost (XALATAN) 0.005 % ophthalmic solution Place 1 drop into both eyes daily.       lisinopril  "(ZESTRIL) 5 MG tablet Take 1 tablet (5 mg) by mouth daily. 100 tablet 2     magnesium oxide 400 MG tablet Take 1 tablet (400 mg) by mouth 2 times daily 180 tablet 3     mycophenolate (GENERIC EQUIVALENT) 500 MG tablet Take 3 tablets (1,500 mg) by mouth 2 times daily 180 tablet 11     omega-3 acid ethyl esters (LOVAZA) 1 g capsule TOME 1 CAPSULA POR LA BOCA CADA LISA (Patient taking differently: Take 1 g by mouth every morning.) 100 capsule 2     polyethylene glycol (GOLYTELY) 236 g suspension Two days before procedure at 5PM fill first container with water. Mix and drink an 8 oz glass every 15 minutes until HALF of the container is gone. Place the remainder in the refrigerator. One day before procedure at 5PM drink second half of bowel prep. Drink an 8 oz glass every 15 minutes until it is gone. Day of procedure 6 hours before arrival time fill the 2nd container with water. Mix and drink an 8 oz glass every 15 minutes until HALF of the container is gone. Discard the remaining solution. 8000 mL 0     rosuvastatin (CRESTOR) 20 MG tablet Take 1 tablet (20 mg) by mouth daily. 100 tablet 2     senna-docusate (SENOKOT-S/PERICOLACE) 8.6-50 MG tablet Take 1 tablet by mouth 2 times daily as needed (hold for loose stools) 60 tablet 3     sirolimus (GENERIC EQUIVALENT) 0.5 MG tablet Take 6 tablets (3 mg) by mouth daily (Patient taking differently: Take 3 mg by mouth daily (with lunch).) 180 tablet 11     sulfamethoxazole-trimethoprim (BACTRIM) 400-80 MG tablet Take 1 tablet by mouth three times a week. 45 tablet 3     tamsulosin (FLOMAX) 0.4 MG capsule Take 1 capsule (0.4 mg) by mouth every morning. 90 capsule 3     Current Facility-Administered Medications   Medication Dose Route Frequency Provider Last Rate Last Admin     bevacizumab (AVASTIN) intravitreal inj 1.25 mg  1.25 mg Intravitreal Q28 Days Triny Bunch MD   1.25 mg at 10/30/24 0905      Vitals:  BP (!) 152/71   Pulse 84   Ht 1.651 m (5' 5\")   Wt 81.6 " kg (180 lb)   BMI 29.95 kg/m      Exam:  GENERAL APPEARANCE: alert and no distress  RESP: lungs clear to auscultation - no rales, rhonchi or wheezes  CV: regular rhythm, normal rate  EDEMA: mild LE edema bilaterally  SKIN: no visible rash    LABS:   CMP  Recent Labs   Lab Test 10/08/24  0937 07/22/24  0711 04/16/24  1033 01/15/24  1511 11/16/23  2301 11/16/23  1914 07/19/21  0950 05/11/21  0905 03/02/21  0818 01/14/21  0811 01/05/21  0816   NA  --  141 138 138  --  139   < > 141 142 137 139   POTASSIUM  --  3.6 4.8 4.5  --  4.1   < > 4.0 4.2 4.8 4.3   CHLORIDE  --  105 104 100  --  105   < > 107 108 106 107   CO2  --  26 23 26  --  22   < > 27 27 28 25   ANIONGAP  --  10 11 12  --  12   < > 6 7 4 7   * 99 97 221*   < > 239*   < > 98 110* 145* 137*   BUN  --  34.8* 34.1* 22.1  --  16.7   < > 34* 36* 28 28   CR  --  2.25* 2.22* 2.10*  --  1.88*   < > 1.84* 1.83* 1.69* 1.68*   GFRESTIMATED  --  31* 31* 33*  --  38*   < > 37* 37* 41* 41*   GFRESTBLACK  --   --   --   --   --   --   --  43* 43* 48* 48*   BRYANNA  --  8.9 8.8 8.7*  --  8.5*   < > 9.0 9.2 9.2 9.2    < > = values in this interval not displayed.     Recent Labs   Lab Test 07/22/24  0711 04/16/24  1033 11/16/23  1914 10/25/23  1005 10/11/23  1128   BILITOTAL 0.3 0.3 0.2 0.2 0.2   ALKPHOS 119 110 129 138* 124   ALT 8 9  --  13 20   AST 15 15  --  17 22     CBC  Recent Labs   Lab Test 12/05/24  1159 07/22/24  0711 04/29/24  0736 04/16/24  1033 11/17/23  1359 11/17/23  0556   HGB 10.5* 10.7* 10.2* 10.2*   < > 6.9*  6.9*   WBC 7.7 6.6  --  3.7*  --  3.8*   RBC 3.72* 3.71*  --  3.51*  --  2.47*   HCT 33.2* 33.8* 30.9* 31.7*   < > 22.0*   MCV 89 91  --  90  --  89   MCH 28.2 28.8  --  29.1  --  27.9   MCHC 31.6 31.7  --  32.2  --  31.4*   RDW 15.0 14.0  --  14.1  --  15.8*    219  --  188  --  139*    < > = values in this interval not displayed.     URINE STUDIES  Recent Labs   Lab Test 10/12/23  0258 07/28/23  1226 09/28/21  0926 09/25/20  1241   COLOR  Light Yellow Light Yellow Light Yellow Yellow   APPEARANCE Clear Clear Clear Clear   URINEGLC Negative 100* 50* 70*   URINEBILI Negative Negative Negative Negative   URINEKETONE Negative Negative Negative Negative   SG 1.015 1.014 1.016 1.018   UBLD Negative Negative Negative Negative   URINEPH 5.0 5.5 6.5 5.5   PROTEIN 20* Negative 10* 10*   NITRITE Negative Negative Negative Negative   LEUKEST Negative Negative Negative Negative   RBCU 0 <1 0 0   WBCU 3 <1 0 0     Recent Labs   Lab Test 03/14/22  1408 10/03/18  0725   [facility] 0.29* 0.06     PTH  Recent Labs   Lab Test 01/15/24  1511 05/03/23  0844 08/16/18  0834   PTHI 70* 40 180*     IRON STUDIES  Recent Labs   Lab Test 07/22/24  0711 04/29/24  0736 04/01/24  1125   IRON 52* 52* 69   * 203* 228*   IRONSAT 26 26 30   ESVERIANO 278 284 398       Adele Huffman PA-C    Visit length 15 minutes. An additional 20 minutes were spent on date of service in chart review, documentation, and other activities as noted.       Again, thank you for allowing me to participate in the care of your patient.      Sincerely,    ADELE PALMA PA-C

## 2024-12-05 NOTE — PATIENT INSTRUCTIONS
No changes today.   Check blood pressure daily, one hour after morning mediations. Keep log.   Avoid ibuprofen/aleve.   Continue good hydration, low sodium diet.   Labs and follow up in 4 months.

## 2024-12-06 LAB
FERRITIN SERPL-MCNC: 314 NG/ML (ref 31–409)
IRON BINDING CAPACITY (ROCHE): 250 UG/DL (ref 240–430)
IRON SATN MFR SERPL: 18 % (ref 15–46)
IRON SERPL-MCNC: 44 UG/DL (ref 61–157)

## 2024-12-09 ENCOUNTER — PATIENT OUTREACH (OUTPATIENT)
Dept: CARE COORDINATION | Facility: CLINIC | Age: 69
End: 2024-12-09
Payer: COMMERCIAL

## 2024-12-09 DIAGNOSIS — E11.3513 TYPE 2 DIABETES MELLITUS WITH PROLIFERATIVE RETINOPATHY OF BOTH EYES AND MACULAR EDEMA, UNSPECIFIED WHETHER LONG TERM INSULIN USE (H): Primary | ICD-10-CM

## 2024-12-11 ENCOUNTER — OFFICE VISIT (OUTPATIENT)
Dept: OPHTHALMOLOGY | Facility: CLINIC | Age: 69
End: 2024-12-11
Attending: OPHTHALMOLOGY
Payer: COMMERCIAL

## 2024-12-11 DIAGNOSIS — H43.11 VITREOUS HEMORRHAGE OF RIGHT EYE (H): ICD-10-CM

## 2024-12-11 PROCEDURE — 67028 INJECTION EYE DRUG: CPT | Mod: RT | Performed by: OPHTHALMOLOGY

## 2024-12-11 PROCEDURE — 92134 CPTRZ OPH DX IMG PST SGM RTA: CPT | Performed by: OPHTHALMOLOGY

## 2024-12-11 PROCEDURE — 250N000011 HC RX IP 250 OP 636: Performed by: OPHTHALMOLOGY

## 2024-12-11 RX ADMIN — Medication 1.25 MG: at 10:03

## 2024-12-11 ASSESSMENT — SLIT LAMP EXAM - LIDS
COMMENTS: NORMAL
COMMENTS: NORMAL

## 2024-12-11 ASSESSMENT — TONOMETRY
IOP_METHOD: TONOPEN
OS_IOP_MMHG: 16
OD_IOP_MMHG: 20

## 2024-12-11 ASSESSMENT — VISUAL ACUITY
CORRECTION_TYPE: GLASSES
OD_CC+: -1
OS_CC: HM
OD_CC: 20/30
METHOD: SNELLEN - LINEAR

## 2024-12-11 ASSESSMENT — EXTERNAL EXAM - RIGHT EYE: OD_EXAM: NORMAL

## 2024-12-11 ASSESSMENT — REFRACTION_WEARINGRX
SPECS_TYPE: LAST MR
OD_AXIS: 017
OS_SPHERE: BALANCE
OD_SPHERE: -0.50
OD_ADD: +2.50
OD_CYLINDER: +1.00

## 2024-12-11 ASSESSMENT — EXTERNAL EXAM - LEFT EYE: OS_EXAM: NORMAL

## 2024-12-11 ASSESSMENT — CUP TO DISC RATIO: OD_RATIO: 0.25

## 2024-12-11 NOTE — PROGRESS NOTES
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 Last vitreous hemorrhage 9.2024  [facility]ed [facility] with VH OU,     s/p cardiac Txp 7/2020 for severe ischemic cardiomyopathy  CKD, HTn, DM II    PAST OCULAR SURGERY  PPV/MS/retinectomy/SO OS 12/2020  CE/IOL OD 2/2023  PRP fill-in OD  8/2022 & 10/2023      RETINAL IMAGING:  OCT 12/11/24   OD - central DME improved, vit opacities worse, PVD, -> 340-> 319->350  OS - chronic ME and temporal atrophy     Fundus photos  Consistent with exam each eye       ASSESSMENT & PLAN  # DMII  # PDR wtih DM II and DME OD   - s/p PRP last fill-in 10/2023   - given anti-VEGF for VH and Diabetic macular edema last injection    - quiescent on DFE      # recurrent VH right eye    - prior onset 2021 Tx with Avastin, resolved   - new onset 9/29/24   - last Avastin 1/2023   - advise repeat Avastin 10/1/24; 10/30/24 ; 12/11/24   Might need inj at intervals to prevent recurrent vitreous hemorrhage    - r/b/a IVT anti-VEGF d/w patient   - infection, blindness, retinal detachment, cataract, bleeding, need for emergency procedures or surgery   - patient ok with resident or fellow performance of injection      # DME OD   - , used ketorolac in past   - anti-VEGF for VH in 2022 last injection 1/2023 Avastin   - worse on 7/2024 but better without Tx 8/2024   - worse today but decr VA likely 2/2 VH  12/11/24 Optical Coherence Tomography improved Diabetes mellitus    - Avastin for vitreous hemorrhage and Diabetic macular edema       # PDR OS   - s/p PRP in 2019   - s/p PPV in 2020 for funnel RD after LTFU   - Avastin for NVG last 2022     - quiescent    # h/o RD OS   - s/p repair 2020 with oil   - retina flat    # h/o NVI/NVG OS   - s/p Avastin last 2022    # Ocular HTN   - IOP 15/21   - continue  latanoprost       # h/o cardiac Txp    PLAN: follow up in 8-10 weeks with fluorescein angiography both eyes transits right eye (oral fluorescein angiography); Optical Coherence Tomography and avastin right eye   ~~~~~~~~~~~~~~~~~~~~~~~~~~~~~~~~~~   Complete documentation of historical and exam elements from today's encounter can be found in the full encounter summary report (not reduplicated in this progress note).  I personally obtained the chief complaint(s) and history of present illness.  I confirmed and edited as necessary the review of systems, past medical/surgical history, family history, social history, and examination findings as documented by others; and I examined the patient myself.  I personally reviewed the relevant tests, images, and reports as documented above.  I formulated and edited as necessary the assessment and plan and discussed the findings and management plan with the patient and family and No resident or fellow assisted with the procedures performed.  I performed the procedures myself.    Triny Bunch MD  Professor of Ophthalmology  Vitreo-Retinal surgeon   Department of Ophthalmology and Visual Neurosciences   [facility]  Phone: [phone]   Fax: [phone]

## 2024-12-12 ENCOUNTER — PATIENT OUTREACH (OUTPATIENT)
Dept: NEPHROLOGY | Facility: CLINIC | Age: 69
End: 2024-12-12
Payer: COMMERCIAL

## 2024-12-12 ENCOUNTER — TELEPHONE (OUTPATIENT)
Dept: EDUCATION SERVICES | Facility: CLINIC | Age: 69
End: 2024-12-12
Payer: COMMERCIAL

## 2024-12-12 DIAGNOSIS — Z79.4 TYPE 2 DIABETES MELLITUS WITH DIABETIC NEPHROPATHY, WITH LONG-TERM CURRENT USE OF INSULIN (H): ICD-10-CM

## 2024-12-12 DIAGNOSIS — E11.21 TYPE 2 DIABETES MELLITUS WITH DIABETIC NEPHROPATHY, WITH LONG-TERM CURRENT USE OF INSULIN (H): ICD-10-CM

## 2024-12-12 NOTE — TELEPHONE ENCOUNTER
Spoke with patient via [employer] interpretor to inform him of the increase in empagliflozin (JARDIANCE) from 10 mg daily to 25 mg daily. Patient also inform that is recommended to schedule an appointment for diabetes education and he will be called again to set up that appointment. Patient stated an understanding and that he would schedule an appointment.  AURORA Hagen Care Coordinator  Nephrology

## 2024-12-12 NOTE — TELEPHONE ENCOUNTER
Patient Contacted for the patient to call back and schedule the following:    Appointment type: diabetes ed  Provider: Bre Mi  Return date: next avail  Specialty phone number: [phone]  Additional appointment(s) needed:   Additonal Notes: Spoke with patient to schedule follow up after changing medication but patient declined to schedule. Patient stated that he would speak with his doctor first and then call us to schedule if he thinks he needs to.     Bre Mi, RN  [id number]  Can you look at my schedule and try to get this anitha scheduled sometime in the next few weeks. Let me know if you aren't finding anything.    Cintia Wallis on 12/12/2024 at 10:54 AM

## 2024-12-16 ENCOUNTER — OFFICE VISIT (OUTPATIENT)
Dept: FAMILY MEDICINE | Facility: CLINIC | Age: 69
End: 2024-12-16
Payer: COMMERCIAL

## 2024-12-16 VITALS
SYSTOLIC BLOOD PRESSURE: 127 MMHG | OXYGEN SATURATION: 98 % | DIASTOLIC BLOOD PRESSURE: 70 MMHG | RESPIRATION RATE: 16 BRPM | WEIGHT: 180 LBS | BODY MASS INDEX: 29.99 KG/M2 | TEMPERATURE: 97.9 F | HEART RATE: 96 BPM | HEIGHT: 65 IN

## 2024-12-16 DIAGNOSIS — Z94.1 HEART REPLACED BY TRANSPLANT (H): ICD-10-CM

## 2024-12-16 DIAGNOSIS — N18.4 CKD (CHRONIC KIDNEY DISEASE) STAGE 4, GFR 15-29 ML/MIN (H): ICD-10-CM

## 2024-12-16 DIAGNOSIS — N25.81 SECONDARY RENAL HYPERPARATHYROIDISM (H): ICD-10-CM

## 2024-12-16 DIAGNOSIS — Z23 NEED FOR PNEUMOCOCCAL 20-VALENT CONJUGATE VACCINATION: ICD-10-CM

## 2024-12-16 DIAGNOSIS — Z23 NEED FOR TDAP VACCINATION: ICD-10-CM

## 2024-12-16 DIAGNOSIS — Z00.00 ENCOUNTER FOR MEDICARE ANNUAL WELLNESS EXAM: Primary | ICD-10-CM

## 2024-12-16 DIAGNOSIS — M54.50 BILATERAL LOW BACK PAIN WITHOUT SCIATICA, UNSPECIFIED CHRONICITY: ICD-10-CM

## 2024-12-16 DIAGNOSIS — D84.81 IMMUNODEFICIENCY DUE TO CONDITIONS CLASSIFIED ELSEWHERE (H): ICD-10-CM

## 2024-12-16 DIAGNOSIS — I10 HYPERTENSION GOAL BP (BLOOD PRESSURE) < 140/90: Chronic | ICD-10-CM

## 2024-12-16 DIAGNOSIS — Z12.11 SCREEN FOR COLON CANCER: ICD-10-CM

## 2024-12-16 DIAGNOSIS — D63.1 ANEMIA OF CHRONIC RENAL FAILURE, STAGE 4 (SEVERE) (H): ICD-10-CM

## 2024-12-16 DIAGNOSIS — Z29.11 NEED FOR VACCINATION AGAINST RESPIRATORY SYNCYTIAL VIRUS: ICD-10-CM

## 2024-12-16 DIAGNOSIS — I13.0 HYPERTENSIVE HEART AND CHRONIC KIDNEY DISEASE WITH HEART FAILURE AND STAGE 1 THROUGH STAGE 4 CHRONIC KIDNEY DISEASE, OR UNSPECIFIED CHRONIC KIDNEY DISEASE (H): ICD-10-CM

## 2024-12-16 DIAGNOSIS — E11.21 TYPE 2 DIABETES MELLITUS WITH DIABETIC NEPHROPATHY, WITH LONG-TERM CURRENT USE OF INSULIN (H): Chronic | ICD-10-CM

## 2024-12-16 DIAGNOSIS — I48.92 ATRIAL FLUTTER, UNSPECIFIED TYPE (H): ICD-10-CM

## 2024-12-16 DIAGNOSIS — Z79.4 TYPE 2 DIABETES MELLITUS WITH DIABETIC NEPHROPATHY, WITH LONG-TERM CURRENT USE OF INSULIN (H): Chronic | ICD-10-CM

## 2024-12-16 DIAGNOSIS — N18.4 ANEMIA OF CHRONIC RENAL FAILURE, STAGE 4 (SEVERE) (H): ICD-10-CM

## 2024-12-16 PROBLEM — A41.9 SEPSIS (H): Status: RESOLVED | Noted: 2020-09-22 | Resolved: 2024-12-16

## 2024-12-16 PROBLEM — A04.4 E. COLI COLITIS: Status: RESOLVED | Noted: 2023-10-12 | Resolved: 2024-12-16

## 2024-12-16 PROBLEM — N18.9 ACUTE RENAL FAILURE SUPERIMPOSED ON CHRONIC KIDNEY DISEASE, UNSPECIFIED ACUTE RENAL FAILURE TYPE, UNSPECIFIED CKD STAGE (H): Status: RESOLVED | Noted: 2023-10-11 | Resolved: 2024-12-16

## 2024-12-16 PROBLEM — A08.11 NOROVIRUS: Status: RESOLVED | Noted: 2023-10-12 | Resolved: 2024-12-16

## 2024-12-16 PROBLEM — D64.9 ANEMIA, UNSPECIFIED TYPE: Status: RESOLVED | Noted: 2023-11-16 | Resolved: 2024-12-16

## 2024-12-16 PROBLEM — I82.A11: Status: RESOLVED | Noted: 2022-03-02 | Resolved: 2024-12-16

## 2024-12-16 PROBLEM — N17.9 ACUTE RENAL FAILURE SUPERIMPOSED ON CHRONIC KIDNEY DISEASE, UNSPECIFIED ACUTE RENAL FAILURE TYPE, UNSPECIFIED CKD STAGE (H): Status: RESOLVED | Noted: 2023-10-11 | Resolved: 2024-12-16

## 2024-12-16 PROBLEM — Z13.220 LIPID SCREENING: Status: RESOLVED | Noted: 2023-07-17 | Resolved: 2024-12-16

## 2024-12-16 PROBLEM — D75.829 HIT (HEPARIN-INDUCED THROMBOCYTOPENIA) (H): Status: RESOLVED | Noted: 2021-02-02 | Resolved: 2024-12-16

## 2024-12-16 PROCEDURE — G0009 ADMIN PNEUMOCOCCAL VACCINE: HCPCS | Performed by: FAMILY MEDICINE

## 2024-12-16 PROCEDURE — G0438 PPPS, INITIAL VISIT: HCPCS | Performed by: FAMILY MEDICINE

## 2024-12-16 PROCEDURE — 90677 PCV20 VACCINE IM: CPT | Performed by: FAMILY MEDICINE

## 2024-12-16 SDOH — HEALTH STABILITY: PHYSICAL HEALTH: ON AVERAGE, HOW MANY DAYS PER WEEK DO YOU ENGAGE IN MODERATE TO STRENUOUS EXERCISE (LIKE A BRISK WALK)?: 0 DAYS

## 2024-12-16 SDOH — HEALTH STABILITY: PHYSICAL HEALTH: ON AVERAGE, HOW MANY MINUTES DO YOU ENGAGE IN EXERCISE AT THIS LEVEL?: 0 MIN

## 2024-12-16 ASSESSMENT — PAIN SCALES - GENERAL: PAINLEVEL_OUTOF10: SEVERE PAIN (7)

## 2024-12-16 ASSESSMENT — SOCIAL DETERMINANTS OF HEALTH (SDOH): HOW OFTEN DO YOU GET TOGETHER WITH FRIENDS OR RELATIVES?: MORE THAN THREE TIMES A WEEK

## 2024-12-16 NOTE — PROGRESS NOTES
Preventive Care Visit  [facility]  Fracisco Casiano MD, Family Medicine  Dec 16, 2024      Assessment & Plan       ICD-10-CM    1. Encounter for Medicare annual wellness exam  Z00.00       2. Immunodeficiency due to conditions classified elsewhere (H)  D84.81       3. Heart replaced by transplant (H)  Z94.1       4. Need for vaccination against respiratory syncytial virus  Z29.11       5. Screen for colon cancer  Z12.11       6. Need for Tdap vaccination  Z23       7. Need for pneumococcal 20-valent conjugate vaccination  Z23 PNEUMOCOCCAL 20 VALENT CONJUGATE (PREVNAR 20)      8. Hypertensive heart and chronic kidney disease with heart failure and stage 1 through stage 4 chronic kidney disease, or unspecified chronic kidney disease (H)  I13.0       9. CKD (chronic kidney disease) stage 4, GFR 15-29 ml/min (H)  N18.4       10. Atrial flutter, unspecified type (H)  I48.92       11. Secondary renal hyperparathyroidism (H)  N25.81       12. Type 2 diabetes mellitus with diabetic nephropathy, with long-term current use of insulin (H)  E11.21     Z79.4       13. Hypertension goal BP (blood pressure) < 140/90  I10       14. Anemia of chronic renal failure, stage 4 (severe) (H)  N18.4     D63.1       15. Bilateral low back pain without sciatica, unspecified chronicity  M54.50         We discussed the above conditions  He will continue his same baseline meds and ongoing care with his various specialists  We will give him a Prevnar 20 vaccine today  I encouraged him to get a Tdap tetanus booster and an RSV vaccine from his local pharmacy (for better insurance coverage than the clinic)  Reviewed some conservative care for the low back discomfort  I advised him to follow through with the repeat colonoscopy with the 2-day prep and he says he will schedule that    Plan a tentative recheck in 1 year, or sooner prn      BMI  Estimated body mass index is 30.28 kg/m  as calculated from the following:    Height as  "of this encounter: 1.642 m (5' 4.65\").    Weight as of this encounter: 81.6 kg (180 lb).   Weight management plan: Discussed healthy diet and exercise guidelines    Counseling  Appropriate preventive services were addressed with this patient via screening, questionnaire, or discussion as appropriate for fall prevention, nutrition, physical activity, Tobacco-use cessation, social engagement, weight loss and cognition.  Checklist reviewing preventive services available has been given to the patient.  Reviewed patient's diet, addressing concerns and/or questions.   The patient was instructed to see the dentist every 6 months.   He is at risk for psychosocial distress and has been provided with information to reduce risk.   Discussed possible causes of fatigue. Updated plan of care.  Patient reported difficulty with activities of daily living were addressed today.        Terry Epstein is a 69 year old, presenting for the following:  Wellness Visit        12/16/2024     9:42 AM   Additional Questions   Roomed by Doretha ARIAS    History is provided with help of a .    He has a history of a heart transplant and is actively followed by various specialists including cardiology, nephrology, endocrinology, and ophthalmology.  He had a colonoscopy done a couple of months ago, but his bowel prep was poor, so he was advised to do a 2-day prep into come back soon to redo that one.  He still has not scheduled that.  He is on empagliflozin now for his diabetes and CKD.  He gets regular lab work done through these various providers that he is seeing.  He is due for some vaccines.    He has been having some low back discomfort recently.  Health Care Directive  Patient has a Health Care Directive on file        12/16/2024   General Health   How would you rate your overall physical health? (!) FAIR   Feel stress (tense, anxious, or unable to sleep) Rather much      (!) STRESS CONCERN      12/16/2024 "   Nutrition   Diet: Regular (no restrictions)            12/16/2024   Exercise   Days per week of moderate/strenous exercise 0 days   Average minutes spent exercising at this level 0 min      (!) EXERCISE CONCERN      12/16/2024   Social Factors   Frequency of gathering with friends or relatives More than three times a week   Worry food won't last until get money to buy more No   Food not last or not have enough money for food? No   Do you have housing? (Housing is defined as stable permanent housing and does not include staying ouside in a car, in a tent, in an abandoned building, in an overnight shelter, or couch-surfing.) Yes   Are you worried about losing your housing? No   Lack of transportation? No   Unable to get utilities (heat,electricity)? No            12/16/2024   Fall Risk   Fallen 2 or more times in the past year? No    Trouble with walking or balance? Yes    Gait Speed Test (Document in seconds) 4.31   Gait Speed Test Interpretation Less than or equal to 5.00 seconds - PASS       Patient-reported          12/16/2024   Activities of Daily Living- Home Safety   Needs help with the following daily activites Transportation    Preparing meals    Housework    Bathing   Safety concerns in the home None of the above       Multiple values from one day are sorted in reverse-chronological order         12/16/2024   Dental   Dentist two times every year? (!) NO            12/16/2024   Hearing Screening   Hearing concerns? None of the above            12/16/2024   Driving Risk Screening   Patient/family members have concerns about driving No            12/16/2024   General Alertness/Fatigue Screening   Have you been more tired than usual lately? (!) YES            12/16/2024   Urinary Incontinence Screening   Bothered by leaking urine in past 6 months No               Today's PHQ-2 Score:       12/16/2024     9:49 AM   PHQ-2 ( 1999 [employer])   Q1: Little interest or pleasure in doing things 1   Q2: Feeling down,  depressed or hopeless 0    PHQ-2 Score 1   Q1: Little interest or pleasure in doing things Not at all   Q2: Feeling down, depressed or hopeless Not at all   PHQ-2 Score 0       Patient-reported           12/16/2024   Substance Use   Alcohol more than 3/day or more than 7/wk No   Do you have a current opioid prescription? No   How severe/bad is pain from 1 to 10? 7/10   Do you use any other substances recreationally? No        Social History     Tobacco Use    Smoking status: Never    Smokeless tobacco: Never    Tobacco comments:     Never smoked; non-smoking household   Vaping Use    Vaping status: Never Used   Substance Use Topics    Alcohol use: No     Alcohol/week: 0.0 standard drinks of alcohol    Drug use: No           12/16/2024   AAA Screening   Family history of Abdominal Aortic Aneurysm (AAA)? No      ASCVD Risk   The 10-year ASCVD risk score (Giovanna DICKINSON, et al., 2019) is: 38.4%    Values used to calculate the score:      Age: 69 years      Sex: Male      Is Non- : No      Diabetic: Yes      Tobacco smoker: No      Systolic Blood Pressure: 127 mmHg      Is BP treated: Yes      HDL Cholesterol: 31 mg/dL      Total Cholesterol: 133 mg/dL          Reviewed and updated as needed this visit by Provider                    Patient Active Problem List   Diagnosis    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)    Hyperlipidemia LDL goal <100    Hypertension goal BP (blood pressure) < 140/90    CHF (NYHA class II, ACC/AHA stage C) (H)    CKD (chronic kidney disease) stage 3, GFR 30-59 ml/min (H)    Automatic implantable cardioverter-defibrillator - [employer] single lead ICD, Not Dependent    Chronic systolic heart failure (H)    Proteinuria    Vitamin D deficiency    OA (osteoarthritis) of knee    Chondromalacia of patella, unspecified laterality    Pain in joint of left shoulder    Tinnitus    Ptosis of right eyelid    Secondary renal hyperparathyroidism (H)     Cortical cataract of both eyes    Moderate nonproliferative diabetic retinopathy, without macular edema, associated with type 2 diabetes mellitus    CAHNTEL (obstructive sleep apnea)- severe (AHI 30)    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, unspecified whether long term insulin use (H)    Nuclear cataract, nonsenile    Coronary artery disease involving native coronary artery of native heart without angina pectoris    Status post coronary angiogram    Dental caries    Heart transplant, orthotopic, status (H)    Heart replaced by transplant (H)    Need for prophylactic antibiotic    Traction detachment of left retina    Immunodeficiency, unspecified (H)    CKD (chronic kidney disease) stage 4, GFR 15-29 ml/min (H)    Atrial flutter, unspecified type (H)    Nuclear senile cataract of right eye    Prostate cancer screening    Type 2 diabetes mellitus with diabetic nephropathy, with long-term current use of insulin (H)    Anemia of chronic renal failure, stage 4 (severe) (H)    Intermediate coronary syndrome (H)     Past Surgical History:   Procedure Laterality Date    CATARACT IOL, RT/LT      COLONOSCOPY N/A 8/7/2019    Procedure: COLONOSCOPY, WITH POLYPECTOMY AND BIOPSY;  Surgeon: Chauncey Morataya MD;  Location:  GI    COLONOSCOPY N/A 5/12/2023    Procedure: Colonoscopy;  Surgeon: Mariano Velasquez MD;  Location:  GI    COLONOSCOPY N/A 10/8/2024    Procedure: Colonoscopy;  Surgeon: Rip Bryan MD;  Location:  GI    CV ANGIOGRAM CORONARY GRAFT N/A 7/2/2020    Procedure: Angiogram Coronary Graft;  Surgeon: Alex Lantigua MD;  Location: [facility] CARDIAC CATH LAB    CV CORONARY ANGIOGRAM N/A 7/2/2020    Procedure: CV CORONARY ANGIOGRAM;  Surgeon: Alex Lantigua MD;  Location: [facility] CARDIAC CATH LAB    CV CORONARY ANGIOGRAM N/A 7/20/2021    Procedure: CV CORONARY ANGIOGRAM;  Surgeon: Raimundo Hudson MD;  Location:   HEART CARDIAC CATH LAB    CV CORONARY ANGIOGRAM N/A 9/12/2022    Procedure: Coronary Angiogram- BIPLANE;  Surgeon: Raimundo Hudson MD;  Location:  HEART CARDIAC CATH LAB    CV CORONARY ANGIOGRAM N/A 7/17/2023    Procedure: Coronary Angiogram;  Surgeon: Alex Lantigua MD;  Location:  HEART CARDIAC CATH LAB    CV HEART BIOPSY N/A 7/27/2020    Procedure: Heart Biopsy;  Surgeon: Mario Burr MD;  Location:  HEART CARDIAC CATH LAB    CV HEART BIOPSY N/A 8/3/2020    Procedure: CV HEART BIOPSY;  Surgeon: Alex Lantigua MD;  Location:  HEART CARDIAC CATH LAB    CV HEART BIOPSY N/A 8/10/2020    Procedure: CV HEART BIOPSY;  Surgeon: Mario Burr MD;  Location:  HEART CARDIAC CATH LAB    CV HEART BIOPSY N/A 8/17/2020    Procedure: CV HEART BIOPSY;  Surgeon: Raimundo Hudson MD;  Location:  HEART CARDIAC CATH LAB    CV HEART BIOPSY N/A 8/31/2020    Procedure: CV HEART BIOPSY;  Surgeon: Moises Santos MD;  Location:  HEART CARDIAC CATH LAB    CV HEART BIOPSY N/A 9/14/2020    Procedure: CV HEART BIOPSY;  Surgeon: Raimundo Hudson MD;  Location:  HEART CARDIAC CATH LAB    CV HEART BIOPSY N/A 9/28/2020    Procedure: CV HEART BIOPSY;  Surgeon: Raimundo Hudson MD;  Location:  HEART CARDIAC CATH LAB    CV HEART BIOPSY N/A 10/12/2020    Procedure: CV HEART BIOPSY;  Surgeon: John Stuart MD;  Location:  HEART CARDIAC CATH LAB    CV HEART BIOPSY N/A 11/10/2020    Procedure: CV HEART BIOPSY;  Surgeon: John Stuart MD;  Location:  HEART CARDIAC CATH LAB    CV HEART BIOPSY N/A 12/7/2020    Procedure: CV HEART BIOPSY;  Surgeon: Raimundo Hudson MD;  Location:  HEART CARDIAC CATH LAB    CV HEART BIOPSY N/A 1/5/2021    Procedure: CV HEART BIOPSY;  Surgeon: Raimundo Hudson MD;  Location:  HEART CARDIAC CATH LAB    CV HEART BIOPSY N/A 7/20/2021    Procedure: CV HEART BIOPSY;  Surgeon: Tristan,  Raimundo Padgett MD;  Location:  HEART CARDIAC CATH LAB    CV HEART BIOPSY N/A 9/28/2021    Procedure: CV HEART BIOPSY;  Surgeon: Raimundo Hudson MD;  Location:  HEART CARDIAC CATH LAB    CV HEART BIOPSY N/A 9/12/2022    Procedure: Heart Biopsy;  Surgeon: Raimundo Hudson MD;  Location:  HEART CARDIAC CATH LAB    CV HEART BIOPSY N/A 7/17/2023    Procedure: Heart Biopsy;  Surgeon: Alex Lantigua MD;  Location:  HEART CARDIAC CATH LAB    CV INTRA AORTIC BALLOON N/A 7/14/2020    Procedure: RHC WITH LEAVE IN [place]/IABP;  Surgeon: Sonny Saleh MD;  Location:  HEART CARDIAC CATH LAB    CV INTRAVASULAR ULTRASOUND N/A 9/12/2022    Procedure: Intravascular Ultrasound;  Surgeon: Raimundo Hudson MD;  Location:  HEART CARDIAC CATH LAB    CV RIGHT HEART CATH MEASUREMENTS RECORDED N/A 3/25/2019    Procedure: CV RIGHT HEART CATH;  Surgeon: Moises Santos MD;  Location:  HEART CARDIAC CATH LAB    CV RIGHT HEART CATH MEASUREMENTS RECORDED N/A 7/10/2019    Procedure: CV RIGHT HEART CATH;  Surgeon: Jak Mccabe MD;  Location:  HEART CARDIAC CATH LAB    CV RIGHT HEART CATH MEASUREMENTS RECORDED N/A 7/8/2020    Procedure: Right Heart Cath with Leave In [place] already has ICU load;  Surgeon: Jak Mccabe MD;  Location:  HEART CARDIAC CATH LAB    CV RIGHT HEART CATH MEASUREMENTS RECORDED N/A 7/14/2020    Procedure: CV RIGHT HEART CATH;  Surgeon: Sonny Saleh MD;  Location:  HEART CARDIAC CATH LAB    CV RIGHT HEART CATH MEASUREMENTS RECORDED N/A 7/2/2020    Procedure: CV RIGHT HEART CATH;  Surgeon: Alex Lantigua MD;  Location:  HEART CARDIAC CATH LAB    CV RIGHT HEART CATH MEASUREMENTS RECORDED N/A 7/27/2020    Procedure: Right Heart Cath;  Surgeon: Mario Burr MD;  Location:  HEART CARDIAC CATH LAB    CV RIGHT HEART CATH MEASUREMENTS RECORDED N/A 8/3/2020    Procedure: Right Heart Cath;  Surgeon: Alex Lantigua,  MD;  Location:  HEART CARDIAC CATH LAB    CV RIGHT HEART CATH MEASUREMENTS RECORDED N/A 8/10/2020    Procedure: CV RIGHT HEART CATH;  Surgeon: Mario Burr MD;  Location:  HEART CARDIAC CATH LAB    CV RIGHT HEART CATH MEASUREMENTS RECORDED N/A 8/17/2020    Procedure: CV RIGHT HEART CATH;  Surgeon: Raimundo Hudson MD;  Location:  HEART CARDIAC CATH LAB    CV RIGHT HEART CATH MEASUREMENTS RECORDED N/A 8/31/2020    Procedure: CV RIGHT HEART CATH;  Surgeon: Moises Santos MD;  Location:  HEART CARDIAC CATH LAB    CV RIGHT HEART CATH MEASUREMENTS RECORDED N/A 9/14/2020    Procedure: CV RIGHT HEART CATH;  Surgeon: aRimundo Hudson MD;  Location:  HEART CARDIAC CATH LAB    CV RIGHT HEART CATH MEASUREMENTS RECORDED N/A 9/28/2020    Procedure: CV RIGHT HEART CATH;  Surgeon: Raimundo Hudson MD;  Location:  HEART CARDIAC CATH LAB    CV RIGHT HEART CATH MEASUREMENTS RECORDED N/A 10/12/2020    Procedure: CV RIGHT HEART CATH;  Surgeon: John Stuart MD;  Location:  HEART CARDIAC CATH LAB    CV RIGHT HEART CATH MEASUREMENTS RECORDED N/A 11/10/2020    Procedure: CV RIGHT HEART CATH;  Surgeon: John Stuart MD;  Location:  HEART CARDIAC CATH LAB    CV RIGHT HEART CATH MEASUREMENTS RECORDED N/A 12/7/2020    Procedure: CV RIGHT HEART CATH;  Surgeon: Raimundo Hudson MD;  Location:  HEART CARDIAC CATH LAB    CV RIGHT HEART CATH MEASUREMENTS RECORDED N/A 1/5/2021    Procedure: CV RIGHT HEART CATH;  Surgeon: Raimundo Hudson MD;  Location:  HEART CARDIAC CATH LAB    CV RIGHT HEART CATH MEASUREMENTS RECORDED N/A 7/20/2021    Procedure: CV RIGHT HEART CATH;  Surgeon: Raimundo Hudson MD;  Location:  HEART CARDIAC CATH LAB    CV RIGHT HEART CATH MEASUREMENTS RECORDED N/A 9/28/2021    Procedure: CV RIGHT HEART CATH;  Surgeon: Raimundo Hudson MD;  Location:  HEART CARDIAC CATH LAB    CV RIGHT HEART CATH  MEASUREMENTS RECORDED N/A 9/12/2022    Procedure: Right Heart Catheterization;  Surgeon: Raimundo Hudson MD;  Location:  HEART CARDIAC CATH LAB    CV RIGHT HEART CATH MEASUREMENTS RECORDED N/A 7/17/2023    Procedure: Right Heart Catheterization;  Surgeon: Alex Lantigua MD;  Location:  HEART CARDIAC CATH LAB    ESOPHAGOSCOPY, GASTROSCOPY, DUODENOSCOPY (EGD), COMBINED N/A 10/8/2024    Procedure: ESOPHAGOGASTRODUODENOSCOPY, WITH BIOPSY;  Surgeon: Rip Bryan MD;  Location: [facility] GI    EXTRACTION(S) DENTAL Left 7/13/2020    Procedure: EXTRACTION, TOOTH #11, 12, 13, 15, and 29;  Surgeon: Monica Chao DDS;  Location: [facility] OR    IMPLANT AUTOMATIC IMPLANTABLE CARDIOVERTER DEFIBRILLATOR      INCISION AND DRAINAGE STERNUM W/ IRRIGATION SYSTEM, COMBINED N/A 9/23/2020    Procedure: INCISION AND DRAINAGE, LEFT SUPRACLAVICULAR WOUND INFECTION AND ABDOMENN WOUND.;  Surgeon: Ran Huertas MD;  Location: [facility] OR    INSERT INTRAAORTIC BALLOON PUMP N/A 7/16/2020    Procedure: Subclavian Intra-Aortic Balloon Pump Placement;  Surgeon: Allan Sparrow MD;  [facility]    IRRIGATION AND DEBRIDEMENT CHEST WASHOUT, COMBINED N/A 9/28/2020    Procedure: IRRIGATION AND DEBRIDEMENT OF LEFT UPPER CHEST WOUND.;  Surgeon: Ran Huertas MD;  [facility]: [facility]    PHACOEMULSIFICATION CLEAR CORNEA WITH STANDARD INTRAOCULAR LENS IMPLANT Right 2/6/2023    Procedure: RIGHT EYE PHACOEMULSIFICATION, CATARACT, WITH INTRAOCULAR LENS IMPLANT with trypan blue;  Surgeon: Triny Bunch MD;  Location: [facility] OR    PHACOEMULSIFICATION CLEAR CORNEA WITH STANDARD IOL, VITRECTOMY PARSPLANA 25 GAGUE, COMBINED Left 12/10/2019    Procedure: PHACOEMULSIFICATION, CATARACT, CLEAR CORNEAL INCISION APPROACH, W STD INTRAOCULAR LENS IMPLANT INSERT + VITRECTOMY BY PARS PLANA  USING 25-GAUGE INSTRUMENTS. ENDOLASER, INFUSION OF 20% SF6 GAS;  Surgeon: Triny Bunch MD;  [facility]: [facility] OR    PICC DOUBLE LUMEN PLACEMENT Left  07/28/2020    5Fr - 42cm, Basilic vein, mid SVC    PICC INSERTION Right 07/11/2020    basilic 44 cm total     TRANSPLANT HEART RECIPIENT N/A 7/19/2020    Procedure: REDO MEDIAN STERNOTOMY, TRANSPLANT, ORTHOTOPIC HEART, RECIPIENT, ON PUMP OXYGENATOR, REMOVAL OF CARDIAC DEFIBRILLATOR AND LEAD;  Surgeon: Griselli, Massimo, MD;  Location: [facility] OR    VITRECTOMY, PARS PLANA APPROACH, USING 27-GAUGE INSTRUMENTS Left 12/21/2020    Procedure: 27 gauge pars plana vitectomy, membrane peel, perfluoroctane liquid (PFO), retinectomy, endolaser, Silicone Oil 1000 cs;  Surgeon: Triny Bunch MD;  Location:  OR    [facility] CABG, ARTERY-VEIN, THREE  02/2008       Social History     Tobacco Use    Smoking status: Never    Smokeless tobacco: Never    Tobacco comments:     Never smoked; non-smoking household   Substance Use Topics    Alcohol use: No     Alcohol/week: 0.0 standard drinks of alcohol     Family History   Problem Relation Age of Onset    Diabetes Sister     Diabetes Sister     Diabetes Brother     Macular Degeneration No family hx of     Glaucoma No family hx of     Myocardial Infarction No family hx of     Kidney Disease No family hx of     Anesthesia Reaction No family hx of     Bleeding Disorder No family hx of     Venous thrombosis No family hx of          Current Outpatient Medications   Medication Sig Dispense Refill    Alcohol Swabs PADS Use to swab the area of the injection or sergio as directed   Per insurance coverage 100 each 0    aspirin (ASA) 81 MG chewable tablet Take 1 tablet (81 mg) by mouth daily 120 tablet 0    blood glucose (NO BRAND SPECIFIED) test strip Use to test blood sugar 4 times daily or as directed. 400 strip 3    blood glucose monitoring (ACCU-CHEK FELIPE SMARTVIEW) meter device kit Use to test blood sugar 3-4 times daily, as directed. 1 kit 0    blood glucose monitoring (SOFTCLIX) lancets 1 each 4 times daily Use to test blood sugars 2 times daily. 400 each 8    calcium carbonate-vitamin D  (CALTRATE) 600-10 MG-MCG per tablet TAKE ONE TABLET BY MOUTH TWICE A DAY WITH MEALS 180 tablet 3    Continuous Glucose Sensor (DEXCOM G7 SENSOR) [facility] CAMBIE CADA 10 MCINTYRE 10 each 2    empagliflozin (JARDIANCE) 25 MG TABS tablet Take 1 tablet (25 mg) by mouth daily. 90 tablet 3    furosemide (LASIX) 20 MG tablet Take 1 tablet (20 mg) by mouth daily. 100 tablet 2    HUMALOG KWIKPEN 100 UNIT/ML soln Give 10 units with breakfast, 10 units with lunch,  and 5 units with dinner.  Average daily use is 25 units 90 mL 3    Injection Device for insulin (CEQUR SIMPLICITY 2U) KALI 1 each See Admin Instructions. Change patch every 2-3 days 40 each 4    Injection Device for Insulin (CEQUR SIMPLICITY ) MISC 1 each See Admin Instructions. Use with patches. Change patch every 2-3 days 1 each 1    insulin degludec (TRESIBA) 200 UNIT/ML pen Inject 60 Units Subcutaneous every morning Order pen needles too 90 mL 11    insulin pen needle (32G X 4 MM) 32G X 4 MM miscellaneous Use 5-6 pen needles daily or as directed. 600 each 2    ketorolac (ACULAR) 0.5 % ophthalmic solution Place 1 drop into the right eye 2 times daily 10 mL 3    latanoprost (XALATAN) 0.005 % ophthalmic solution Place 1 drop into both eyes daily.      lisinopril (ZESTRIL) 5 MG tablet Take 1 tablet (5 mg) by mouth daily. 100 tablet 2    magnesium oxide 400 MG tablet Take 1 tablet (400 mg) by mouth 2 times daily 180 tablet 3    mycophenolate (GENERIC EQUIVALENT) 500 MG tablet Take 3 tablets (1,500 mg) by mouth 2 times daily 180 tablet 11    omega-3 acid ethyl esters (LOVAZA) 1 g capsule TOME 1 CAPSULA POR LA BOCA CADA LISA 100 capsule 2    rosuvastatin (CRESTOR) 20 MG tablet Take 1 tablet (20 mg) by mouth daily. 100 tablet 2    senna-docusate (SENOKOT-S/PERICOLACE) 8.6-50 MG tablet Take 1 tablet by mouth 2 times daily as needed (hold for loose stools) 60 tablet 3    sirolimus (GENERIC EQUIVALENT) 0.5 MG tablet Take 6 tablets (3 mg) by mouth daily 180 tablet 11     sulfamethoxazole-trimethoprim (BACTRIM) 400-80 MG tablet Take 1 tablet by mouth three times a week. 45 tablet 3    tamsulosin (FLOMAX) 0.4 MG capsule Take 1 capsule (0.4 mg) by mouth every morning. 90 capsule 3    acetaminophen (TYLENOL) 325 MG tablet Take 3 tablets (975 mg) by mouth every 8 hours as needed for mild pain 60 tablet 3    bisacodyl (DULCOLAX) 5 MG EC tablet Two days prior to exam take two (2) tablets at 4pm. One day prior to exam take two (2) tablets at 4pm (Patient not taking: Reported on 12/16/2024) 4 tablet 0    ferrous sulfate (FEROSUL) 325 (65 Fe) MG tablet Take 1 tablet (325 mg) by mouth 2 times daily (with meals) (Patient taking differently: Take 325 mg by mouth daily (with breakfast).) 180 tablet 3    polyethylene glycol (GOLYTELY) 236 g suspension Two days before procedure at 5PM fill first container with water. Mix and drink an 8 oz glass every 15 minutes until HALF of the container is gone. Place the remainder in the refrigerator. One day before procedure at 5PM drink second half of bowel prep. Drink an 8 oz glass every 15 minutes until it is gone. Day of procedure 6 hours before arrival time fill the 2nd container with water. Mix and drink an 8 oz glass every 15 minutes until HALF of the container is gone. Discard the remaining solution. (Patient not taking: Reported on 12/16/2024) 8000 mL 0     Allergies   Allergen Reactions    Heparin Heparin Induced Thrombocytopenia     HIT screen sent 7/18/2020. CORRINE confirmed positive     Current providers sharing in care for this patient include:  Patient Care Team:  Fracisco Casiano MD as PCP - General (Family Medicine)  Diane Watts APRN CNP as Nurse Practitioner (Cardiology)  Arvin Sheridan MD as MD (Cardiology)  Yue Dowd MD as MD (INTERNAL MEDICINE - ENDOCRINOLOGY, DIABETES & METABOLISM)  Christelle Pettit, RN as Transplant Coordinator (Cardiology)  Negrito Rai [facility] as Pharmacist (Pharmacist)  [facility] (HOME HEALTH AGENCY ([facility]), ([place]))  Triny Bunch MD as Assigned Surgical Provider  Marisabel Prieto PA-C as Assigned Endocrinology Provider  Triny Bunch MD as MD (Ophthalmology)  Fracisco Casiano MD as Assigned PCP  Arvin Sheridan MD as Referring Physician (Cardiovascular Disease)  Cristian Delacruz MD as MD (Dermatology)  Cristian Delacruz MD as MD (Dermatology)  Mónica Shelton MD as MD (Nephrology)  Xiao Gimenez, RN as Specialty Care Coordinator (Nephrology)  Bre Mi, RN as Diabetes Educator (Diabetes Education)  Leopold, Carrie A, RN as Specialty Care Coordinator (Pharmacy)  Malka Huffman MD (Family Practice)  Arvin Sheridan MD as Assigned Heart and Vascular Provider  Hiral Huffman PA-C as Assigned Nephrology Provider    The following health maintenance items are reviewed in Epic and correct as of today:  Health Maintenance   Topic Date Due    ANNUAL REVIEW OF HM ORDERS  Never done    RSV VACCINE (1 - Risk 60-74 years 1-dose series) Never done    DTAP/TDAP/TD IMMUNIZATION (2 - Td or Tdap) 08/04/2019    DIABETIC FOOT EXAM  08/16/2019    MEDICARE ANNUAL WELLNESS VISIT  03/02/2023    Pneumococcal Vaccine: 65+ Years (3 of 3 - PCV) 03/02/2023    COLORECTAL CANCER SCREENING  01/08/2025    COVID-19 Vaccine (6 - 2024-25 season) 01/02/2025    CMP  01/22/2025    MICROALBUMIN  03/05/2025    A1C  06/05/2025    HEMOGLOBIN  06/05/2025    LIPID  07/22/2025    PSA  07/22/2025    BMP  12/05/2025    EYE EXAM  12/11/2025    FALL RISK ASSESSMENT  12/16/2025    ADVANCE CARE PLANNING  03/02/2027    PARATHYROID  Completed    PHOSPHORUS  Completed    HEPATITIS C SCREENING  Completed    PHQ-2 (once per calendar year)  Completed    INFLUENZA VACCINE  Completed    URINALYSIS  Completed    ALK PHOS  Completed    ZOSTER IMMUNIZATION  Completed    HPV IMMUNIZATION  Aged Out    MENINGITIS IMMUNIZATION  Aged Out    RSV MONOCLONAL ANTIBODY  Aged Out    TSH  "W/FREE T4 REFLEX  Discontinued         Review of Systems  Mainly significant for the above.     Objective    Exam  /70   Pulse 96   Temp 97.9  F (36.6  C) (Temporal)   Resp 16   Ht 1.642 m (5' 4.65\")   Wt 81.6 kg (180 lb)   SpO2 98%   BMI 30.28 kg/m     Estimated body mass index is 30.28 kg/m  as calculated from the following:    Height as of this encounter: 1.642 m (5' 4.65\").    Weight as of this encounter: 81.6 kg (180 lb).    Physical Exam  GENERAL: alert and no distress  EYES: Eyes grossly normal to inspection, PERRL and conjunctivae and sclerae normal  HENT: Grossly normal  NECK: no adenopathy, no asymmetry, masses, or scars  RESP: lungs clear to auscultation - no rales, rhonchi or wheezes  CV: regular rate and rhythm, normal S1 S2, no S3 or S4, no murmur, click or rub, no peripheral edema  ABDOMEN: soft, nontender, no hepatosplenomegaly, no masses   MS: no gross musculoskeletal defects noted, no edema  SKIN: no suspicious lesions or rashes  NEURO: Normal strength and tone, mentation intact and speech normal  PSYCH: mentation appears normal, affect normal/bright        12/16/2024   Mini Cog   Clock Draw Score 2 Normal   3 Item Recall 3 objects recalled   Mini Cog Total Score 5            Lab on 12/05/2024   Component Date Value Ref Range Status    Color Urine 12/05/2024 Light Yellow  Colorless, Straw, Light Yellow, Yellow Final    Appearance Urine 12/05/2024 Clear  Clear Final    Glucose Urine 12/05/2024 >=1000 (A)  Negative mg/dL Final    Bilirubin Urine 12/05/2024 Negative  Negative Final    Ketones Urine 12/05/2024 Negative  Negative mg/dL Final    Specific Gravity Urine 12/05/2024 1.017  1.003 - 1.035 Final    Blood Urine 12/05/2024 Negative  Negative Final    pH Urine 12/05/2024 6.5  5.0 - 7.0 Final    Protein Albumin Urine 12/05/2024 50 (A)  Negative mg/dL Final    Urobilinogen Urine 12/05/2024 Normal  Normal, 2.0 mg/dL Final    Nitrite Urine 12/05/2024 Negative  Negative Final    Leukocyte " Esterase Urine 12/05/2024 Negative  Negative Final    RBC Urine 12/05/2024 2  <=2 /HPF Final    WBC Urine 12/05/2024 1  <=5 /HPF Final    Squamous Epithelials Urine 12/05/2024 <1  <=1 /HPF Final    Sodium 12/05/2024 137  135 - 145 mmol/L Final    Potassium 12/05/2024 4.4  3.4 - 5.3 mmol/L Final    Chloride 12/05/2024 99  98 - 107 mmol/L Final    Carbon Dioxide (CO2) 12/05/2024 28  22 - 29 mmol/L Final    Anion Gap 12/05/2024 10  7 - 15 mmol/L Final    Glucose 12/05/2024 103 (H)  70 - 99 mg/dL Final    Urea Nitrogen 12/05/2024 30.5 (H)  8.0 - 23.0 mg/dL Final    Creatinine 12/05/2024 2.35 (H)  0.67 - 1.17 mg/dL Final    GFR Estimate 12/05/2024 29 (L)  >60 mL/min/1.73m2 Final    eGFR calculated using 2021 CKD-EPI equation.    Calcium 12/05/2024 9.5  8.8 - 10.4 mg/dL Final    Reference intervals for this test were updated on 7/16/2024 to reflect our healthy population more accurately. There may be differences in the flagging of prior results with similar values performed with this method. Those prior results can be interpreted in the context of the updated reference intervals.    Albumin 12/05/2024 4.0  3.5 - 5.2 g/dL Final    Phosphorus 12/05/2024 3.7  2.5 - 4.5 mg/dL Final    Total Protein Urine mg/dL 12/05/2024 43.9    mg/dL Final    Total Protein Urine mg/mg Creat 12/05/2024 0.44 (H)  0.00 - 0.20 mg/mg Cr Final    Creatinine Urine mg/dL 12/05/2024 100.0  mg/dL Final    WBC Count 12/05/2024 7.7  4.0 - 11.0 10e3/uL Final    RBC Count 12/05/2024 3.72 (L)  4.40 - 5.90 10e6/uL Final    Hemoglobin 12/05/2024 10.5 (L)  13.3 - 17.7 g/dL Final    Hematocrit 12/05/2024 33.2 (L)  40.0 - 53.0 % Final    MCV 12/05/2024 89  78 - 100 fL Final    MCH 12/05/2024 28.2  26.5 - 33.0 pg Final    MCHC 12/05/2024 31.6  31.5 - 36.5 g/dL Final    RDW 12/05/2024 15.0  10.0 - 15.0 % Final    Platelet Count 12/05/2024 230  150 - 450 10e3/uL Final    Creatinine Urine mg/dL 12/05/2024 100.0  mg/dL Final    Albumin Urine mg/L 12/05/2024 183.0   mg/L Final    The reference ranges have not been established in urine albumin. The results should be integrated into the clinical context for interpretation.    Albumin Urine mg/g Cr 12/05/2024 183.00 (H)  0.00 - 17.00 mg/g Cr Final    Microalbuminuria is defined as an albumin:creatinine ratio of 17 to 299 for males and 25 to 299 for females. A ratio of albumin:creatinine of 300 or higher is indicative of overt proteinuria.  Due to biologic variability, positive results should be confirmed by a second, first-morning random or 24-hour timed urine specimen. If there is discrepancy, a third specimen is recommended. When 2 out of 3 results are in the microalbuminuria range, this is evidence for incipient nephropathy and warrants increased efforts at glucose control, blood pressure control, and institution of therapy with an angiotensin-converting-enzyme (ACE) inhibitor (if the patient can tolerate it).      Estimated Average Glucose 12/05/2024 157 (H)  <117 mg/dL Final    Hemoglobin A1C 12/05/2024 7.1 (H)  <5.7 % Final    Normal <5.7%   Prediabetes 5.7-6.4%    Diabetes 6.5% or higher     Note: Adopted from ADA consensus guidelines.    Iron 12/05/2024 44 (L)  61 - 157 ug/dL Final    Iron Binding Capacity 12/05/2024 250  240 - 430 ug/dL Final    Iron Sat Index 12/05/2024 18  15 - 46 % Final    Ferritin 12/05/2024 314  31 - 409 ng/mL Final              Signed Electronically by: Fracisco Casiano MD

## 2024-12-17 ENCOUNTER — TELEPHONE (OUTPATIENT)
Dept: CARDIOLOGY | Facility: CLINIC | Age: 69
End: 2024-12-17
Payer: COMMERCIAL

## 2024-12-17 DIAGNOSIS — Z94.1 HEART REPLACED BY TRANSPLANT (H): ICD-10-CM

## 2024-12-17 RX ORDER — FUROSEMIDE 20 MG/1
20 TABLET ORAL DAILY
Qty: 90 TABLET | Refills: 3 | Status: SHIPPED | OUTPATIENT
Start: 2024-12-17

## 2024-12-17 NOTE — TELEPHONE ENCOUNTER
[facility] Call Center    Phone Message    May a detailed message be left on voicemail: yes     Reason for Call: Medication Refill Request    Has the patient contacted the pharmacy for the refill? Yes   Name of medication being requested:   furosemide (LASIX) 20 MG tablet [8864] (Order [id number]   Provider who prescribed the medication: Arvin   Pharmacy:      [facility] - [place], [place] - [address]   [facility] - [place], [place] - [address]     Date medication is needed:     Pt will be out this Friday          Action Taken: Other: cardiology     Travel Screening: Not Applicable     Thank you!  [facility]

## 2024-12-26 ENCOUNTER — ALLIED HEALTH/NURSE VISIT (OUTPATIENT)
Dept: OPHTHALMOLOGY | Facility: CLINIC | Age: 69
End: 2024-12-26
Attending: OPHTHALMOLOGY
Payer: COMMERCIAL

## 2024-12-26 ASSESSMENT — REFRACTION_MANIFEST
OD_SPHERE: -1.25
OD_AXIS: 003
OD_CYLINDER: +2.50
OD_ADD: +2.25
OS_SPHERE: BALANCE

## 2024-12-26 ASSESSMENT — REFRACTION_WEARINGRX
OD_ADD: +2.50
OD_AXIS: 017
SPECS_TYPE: LAST MR
OD_CYLINDER: +1.00
OS_SPHERE: BALANCE
OD_SPHERE: -0.50

## 2024-12-26 ASSESSMENT — VISUAL ACUITY
OS_CC: CF 1'
CORRECTION_TYPE: GLASSES
OD_CC: 20/40
METHOD: SNELLEN - LINEAR
OD_CC+: +1

## 2025-01-13 ENCOUNTER — TELEPHONE (OUTPATIENT)
Dept: OPHTHALMOLOGY | Facility: CLINIC | Age: 70
End: 2025-01-13
Payer: COMMERCIAL

## 2025-01-13 NOTE — TELEPHONE ENCOUNTER
[facility] Call Center    Phone Message    May a detailed message be left on voicemail: yes     Reason for Call: Other: Pt's daughter Triny stated that Dr. Bunch wanted to know when pt was going to be traveling. She advised that he will be leaving on 1/26 to travel. If any questions, please call Triny at  # [phone]. Thank you.     Action Taken: Message routed to:  [facility] EYE    Travel Screening: Not Applicable     Date of Service:

## 2025-01-30 DIAGNOSIS — Z94.1 HEART REPLACED BY TRANSPLANT (H): Primary | ICD-10-CM

## 2025-02-11 DIAGNOSIS — Z79.4 TYPE 2 DIABETES MELLITUS WITH HYPERGLYCEMIA, WITH LONG-TERM CURRENT USE OF INSULIN (H): ICD-10-CM

## 2025-02-11 DIAGNOSIS — E78.5 DYSLIPIDEMIA: ICD-10-CM

## 2025-02-11 DIAGNOSIS — E11.65 TYPE 2 DIABETES MELLITUS WITH HYPERGLYCEMIA, WITH LONG-TERM CURRENT USE OF INSULIN (H): ICD-10-CM

## 2025-02-11 DIAGNOSIS — I25.739 CORONARY ARTERY DISEASE INVOLVING NONAUTOLOGOUS BIOLOGICAL CORONARY BYPASS GRAFT WITH ANGINA PECTORIS: ICD-10-CM

## 2025-02-27 RX ORDER — OMEGA-3-ACID ETHYL ESTERS 1 G/1
CAPSULE, LIQUID FILLED ORAL
Qty: 30 CAPSULE | Refills: 11 | Status: SHIPPED | OUTPATIENT
Start: 2025-02-27

## 2025-03-27 NOTE — PATIENT INSTRUCTIONS
Me alegra caesar que el azucar se controla savannah!     Si have falta otra vez usamos Trulicity 1.5 mg cada semana, kiley ahorita no hay necesidad.    Si reciben CeQur, llamen hacer lizandro con Bre para aphrender halie usar la.    Mis mejores aleksandra,      Marisabel Prieto PA-C, MPAS  [facility]  Diabetes, Endocrinology, and Metabolism  [phone] Appointments/Nurse  [phone] Fax               Pt aware to  some samples

## 2025-04-08 ENCOUNTER — PATIENT OUTREACH (OUTPATIENT)
Dept: CARE COORDINATION | Facility: CLINIC | Age: 70
End: 2025-04-08
Payer: COMMERCIAL

## 2025-04-08 NOTE — PROGRESS NOTES
Anemia Management Note - Reminder     Follow-up with anemia management service:    Per telephone encounter on 011325, 'Pt's daughter Triny stated that Dr. Bunch wanted to know when pt was going to be traveling. She advised that he will be leaving on 1/26 to travel. '   Knowing he was going to be away for 3 months, RN will follow up with patient in early May unless notified sooner that he is back from his travels.        Latest Ref Rng & Units 12/26/2023 1/10/2024 4/1/2024 4/16/2024 4/29/2024 7/22/2024 12/5/2024   Anemia   DANDY Dose  40mcg 40mcg        Hemoglobin 13.3 - 17.7 g/dL 8.1  9.1  11.6  10.2  10.2  10.7  10.5    TSAT 15 - 46 %  30  30   26  26  18    Ferritin 31 - 409 ng/mL  277  398   284  278  314          Follow-up call date: 050125    Carrie Leopold, RN BSN  Anemia Services  [facility]  [email or web address]  Office: [phone]  Fax [phone]

## 2025-04-22 ENCOUNTER — TELEPHONE (OUTPATIENT)
Dept: CARDIOLOGY | Facility: CLINIC | Age: 70
End: 2025-04-22
Payer: COMMERCIAL

## 2025-04-22 ENCOUNTER — APPOINTMENT (OUTPATIENT)
Dept: INTERPRETER SERVICES | Facility: CLINIC | Age: 70
End: 2025-04-22
Payer: COMMERCIAL

## 2025-04-22 DIAGNOSIS — Z94.1 HEART TRANSPLANT, ORTHOTOPIC, STATUS (H): ICD-10-CM

## 2025-04-22 DIAGNOSIS — Z94.1 HEART REPLACED BY TRANSPLANT (H): ICD-10-CM

## 2025-04-22 RX ORDER — ROSUVASTATIN CALCIUM 20 MG/1
20 TABLET, COATED ORAL DAILY
Qty: 100 TABLET | Refills: 0 | Status: SHIPPED | OUTPATIENT
Start: 2025-04-22

## 2025-04-22 RX ORDER — LISINOPRIL 5 MG/1
5 TABLET ORAL DAILY
Qty: 100 TABLET | Refills: 0 | Status: SHIPPED | OUTPATIENT
Start: 2025-04-22

## 2025-04-22 NOTE — TELEPHONE ENCOUNTER
Patient is overdue to fill lisinopril and rosuvastatin. Per our records, last filled 1/3/25 for 100 day supply and is 9 days late to fill. Patient requests refills be sent to [facility].     Patient has [employer] coverage and is part of Medicare Part-D Low Income Subsidy program. With this program, the patient is eligible to get certain prescriptions as a 100-day supply at the 30-day prescription supply cost.     Niesha Lucas, PharmD, [facility]  Population Health Pharmacist  [phone]

## 2025-04-28 NOTE — PROGRESS NOTES
Lucian is a 68-year-old male with a complicated medical history including cardiac transplant in July 2020, chronic kidney disease stage IV, congestive heart failure, and a history of heparin-induced thrombocytopenia.  He has been hospitalized previously for having difficulty following diabetes regimen.  He does not read or write well in Telugu and relies heavily on his wife Shivani for support with his medications.  His diabetes is also complicated by nonproliferative diabetic retinopathy.    Lucian and Shivani here in clinic today to follow-up his type 2 diabetes.    We last met in April and again discussed transition from NPH insulin to once daily basal insulin.  He and Shivani since have been meeting with Priscilla Mi to work on this transition.  The last met with her in August and at that point he was taking 60 units of Tresiba once daily Jardiance 10 mg daily, Trulicity 4.5 mg daily and Humalog with breakfast, but not always dinner and higher blood sugars in the afternoon related to snacking.  He continued to also use NPH insulin 50 units with breakfast and 13 units at bedtime.  They discussed the possibility of starting to use a Cequr device to cover his meals.    Today:  A1c today is 7.3%.    He is feeling very good about his blood sugar management.  He is now on just 2 types of insulin Tresiba and Humalog on fixed doses and feels things are going quite right.  Shivani noted that when they last picked up his Jardiance the dosage increased from 10 to 25 mg and she wants to be sure he can can take this.  Currently he has not yet started the prescription as he still has 10 mg tablets at home.  He notes his blood sugars are much better they are giving Tresiba to 50 units each morning and NovoLog generally 10 units with each meal.  They have also started giving NovoLog 5 units on occasion when he has a snack like watermelon.  They check his blood sugar before each meal and if it is greater than 280 they will give 12  units if it is over 300 they will give 1314 or even 16 units.      They also are concerned about his weight loss.  He has not taken GLP 1 agonist for many months.  He feels his appetite is recently good but probably less than it was before.  He denies difficulties with denies that he is eating early satiety melena hematochezia any new or different pains or swelling.      DM medications:  Tresiba 60 units  Breakfast Humalog 10 - 16 with breakfast and 10 - 16  with lunch and 5 with dinner and any large snack.  Jardiance 10 mg daily    CGM data:  Average blood sugars 192 which correlates to an estimated GMI of 7.9% which is at goal for age.  Blood sugars rise especially after his morning meal and fall overnight into range.  Blood sugar is 46% in range without any hypoglycemia.      Objective:  Pleasant Slovenian-speaking male in no acute distress though with notable weight loss.    Accompanied by his wife and both continues to be quiet knowledgeable about his current medication doses.    Mood is good affect is warm bright and appropriate.  respirations are easy and unlabored.   There is no jugular venous distention or other neck masses.  Skin is warm and moist.  Cranial nerves II through XII are grossly intact.  He ambulates independently with nonantalgic gait.  No swelling is appreciated in the lower extremities.   Skin is in reasonably good condition.  Nails are hypertrophic.  Feet are warm sensation is intact to monofilament testing throughout though at times difficult to ascertain as patient is quite ticklish.    Wt Readings from Last 10 Encounters:   05/06/25 73.5 kg (162 lb)   12/16/24 81.6 kg (180 lb)   12/05/24 81.6 kg (180 lb)   09/27/24 83.5 kg (184 lb)   09/17/24 83 kg (183 lb)   08/06/24 82 kg (180 lb 12.8 oz)   07/22/24 82.6 kg (182 lb)   04/16/24 79.8 kg (176 lb)   04/16/24 79.1 kg (174 lb 4.8 oz)   04/01/24 79 kg (174 lb 1.6 oz)           Recent Labs   Lab Test 05/06/25  1059 12/05/24  1306 12/05/24  1159  07/22/24  0711 04/16/24  1134 04/16/24  1033 08/14/23  0917 07/28/23  1226 08/10/20  0845 08/05/20  0843 07/07/20  1732 07/07/20  0720 08/16/18  0834 08/06/18  0000 04/30/18  1148 04/30/18  0000   A1C 7.1*  --  7.1* 7.3*   < >  --    < >  --    < >  --   --   --    < >  --   --   --    HEMOGLOBINA1  --   --   --   --   --   --   --   --   --   --   --   --   --  8.3*  --  10.6*   TSH  --   --   --   --   --   --   --   --   --  0.80  --  1.30   < >  --   --   --    LDL  --   --   --  49  --  46  --   --    < >  --   --   --    < >  --   --   --    HDL  --   --   --  31*  --  35*  --   --    < >  --   --   --    < >  --   --   --    TRIG  --   --   --  263*  --  246*  --   --    < >  --   --   --    < >  --   --   --    CR  --   --  2.35* 2.25*  --  2.22*   < >  --    < > 1.70*   < >  --    < >  --    < >  --    MICROL  --  183.0  --   --   --   --   --  21.4   < >  --   --   --    < >  --   --   --     < > = values in this interval not displayed.        Hemoglobin   Date Value Ref Range Status   12/05/2024 10.5 (L) 13.3 - 17.7 g/dL Final   05/11/2021 13.4 13.3 - 17.7 g/dL Final   ]    Cholesterol   Date Value Ref Range Status   07/22/2024 133 <200 mg/dL Final   01/05/2021 133 <200 mg/dL Final       GFR Estimate   Date Value Ref Range Status   12/05/2024 29 (L) >60 mL/min/1.73m2 Final     Comment:     eGFR calculated using 2021 CKD-EPI equation.   07/22/2024 31 (L) >60 mL/min/1.73m2 Final     Comment:     eGFR calculated using 2021 CKD-EPI equation.   04/16/2024 31 (L) >60 mL/min/1.73m2 Final   05/11/2021 37 (L) >60 mL/min/[1.73_m2] Final     Comment:     Non  GFR Calc  Starting 12/18/2018, serum creatinine based estimated GFR (eGFR) will be   calculated using the Chronic Kidney Disease Epidemiology Collaboration   (CKD-EPI) equation.     03/02/2021 37 (L) >60 mL/min/[1.73_m2] Final     Comment:     Non  GFR Calc  Starting 12/18/2018, serum creatinine based estimated GFR (eGFR) will be    calculated using the Chronic Kidney Disease Epidemiology Collaboration   (CKD-EPI) equation.     01/14/2021 41 (L) >60 mL/min/[1.73_m2] Final     Comment:     Non  GFR Calc  Starting 12/18/2018, serum creatinine based estimated GFR (eGFR) will be   calculated using the Chronic Kidney Disease Epidemiology Collaboration   (CKD-EPI) equation.       Hemoglobin   Date Value Ref Range Status   12/05/2024 10.5 (L) 13.3 - 17.7 g/dL Final   05/11/2021 13.4 13.3 - 17.7 g/dL Final   ]    FIB-4 Calculation: 2.37 at 11/17/2023  5:56 AM  Calculated from:  SGOT/AST: 17 U/L at 10/25/2023 10:05 AM  SGPT/ALT: 13 U/L at 10/25/2023 10:05 AM  Platelets: 139 10e3/uL at 11/17/2023  5:56 AM  Age: 68 years      No hx elevated Amylase or lipase.     EXAMINATION: CT ABDOMEN PELVIS W/O CONTRAST, 10/11/2023 7:07 PM   Pancreas: Mild fatty atrophy of the pancreas. No focal mass or  dilation of the main pancreatic duct.    EXAMINATION: US ABDOMEN COMPLETE, 7/9/2019 6:54 AM   Pancreas: Visualized portions of the head and body of the pancreas are  unremarkable.     Assessment and plan:  Type 2 diabetes , well-controlled:  His blood sugar is now very well-controlled with an A1c today of 7.1% on Tresiba 50 units daily Jardiance 10 units daily and Humalog 10 to 16 units before meals.  He is eligible for graduation in our clinic and is in agreement with this.  He and his wife feel quite good about the work they have done to bring his blood sugar under control and are glad to see their primary care provider in follow-up for management of his diabetes and refills of diabetes medications.  He is provided with 1 year of refills today.    Weight loss: Patient has long quit taking any GLP-1 agonist as he did not tolerate these.  Nonetheless he is now lost 18 pounds in the last 5 months.  Brief review of systems is not revealing, but I did urge him to see his primary care provider for further evaluation.  I note that his renal disease has  continued to progress and he has hemoglobin has dropped but GFR remains in the 30s.  Loss is likely contributing to the improvement in glycemic management.     Dm Complications:  Foot exam updated today. No concerns.  Eye exam utd, will continue to be seen for retinopathy.   Has cardiology and nephrology follow-up -his Jardiance dose has been increased to 25 mg recently.  He will see nephrology next week and I will defer to them if they want to continue this at the higher dose or consider decreasing at GFR was at 29 in December.      Elevated fib 4: It does appear he is at risk for fatty liver, he has not wanted to continue GLP-1 therapy.  I will defer to his primary care provider from it management of the elevated fib 4.   Lucian is meeting all 5 community measures including A1c today of 7.1% and blood sugar values that are also at goal for age by CGMS and he was in agreement with graduating from clinic today.    Unless diabetes becomes uncontrolled again we will not be seeing him anymore in diabetes optimization clinic and care will be transferred back to primary care.      45 minutes spent on the date of the encounter doing chart review, history and exam, documentation, education and counseling, as well as communication and coordination of care, and further activities as noted above.  This time excludes time spent reviewing CGM.  It is my privilege to be involved in the care of the above patient.     Marisabel Prieto PA-C, MPAS  [facility]  Diabetes, Endocrinology, and Metabolism  [phone] Appointments/Nurse Line  [phone] Fax  [phone] pager  On [facility] (inpatient)                                                04/28/25 1:14 PM  PATIENT LAB/IMAGING STATUS : No pending lab orders

## 2025-04-30 DIAGNOSIS — E11.3513 TYPE 2 DIABETES MELLITUS WITH PROLIFERATIVE RETINOPATHY OF BOTH EYES AND MACULAR EDEMA, UNSPECIFIED WHETHER LONG TERM INSULIN USE (H): Primary | ICD-10-CM

## 2025-04-30 PROCEDURE — 2022F DILAT RTA XM EVC RTNOPTHY: CPT | Performed by: OPHTHALMOLOGY

## 2025-05-03 ENCOUNTER — HEALTH MAINTENANCE LETTER (OUTPATIENT)
Age: 70
End: 2025-05-03

## 2025-05-06 ENCOUNTER — OFFICE VISIT (OUTPATIENT)
Dept: ENDOCRINOLOGY | Facility: CLINIC | Age: 70
End: 2025-05-06
Payer: COMMERCIAL

## 2025-05-06 VITALS
OXYGEN SATURATION: 98 % | HEIGHT: 66 IN | BODY MASS INDEX: 26.03 KG/M2 | WEIGHT: 162 LBS | HEART RATE: 92 BPM | SYSTOLIC BLOOD PRESSURE: 98 MMHG | DIASTOLIC BLOOD PRESSURE: 64 MMHG

## 2025-05-06 DIAGNOSIS — E11.65 TYPE 2 DIABETES MELLITUS WITH HYPERGLYCEMIA, WITH LONG-TERM CURRENT USE OF INSULIN (H): Primary | ICD-10-CM

## 2025-05-06 DIAGNOSIS — Z79.4 TYPE 2 DIABETES MELLITUS WITH HYPERGLYCEMIA, WITH LONG-TERM CURRENT USE OF INSULIN (H): Primary | ICD-10-CM

## 2025-05-06 LAB
EST. AVERAGE GLUCOSE BLD GHB EST-MCNC: 157 MG/DL
HBA1C MFR BLD: 7.1 %

## 2025-05-06 PROCEDURE — 3074F SYST BP LT 130 MM HG: CPT | Performed by: PHYSICIAN ASSISTANT

## 2025-05-06 PROCEDURE — 99215 OFFICE O/P EST HI 40 MIN: CPT | Mod: 25 | Performed by: PHYSICIAN ASSISTANT

## 2025-05-06 PROCEDURE — 1125F AMNT PAIN NOTED PAIN PRSNT: CPT | Performed by: PHYSICIAN ASSISTANT

## 2025-05-06 PROCEDURE — 95251 CONT GLUC MNTR ANALYSIS I&R: CPT | Performed by: PHYSICIAN ASSISTANT

## 2025-05-06 PROCEDURE — 3078F DIAST BP <80 MM HG: CPT | Performed by: PHYSICIAN ASSISTANT

## 2025-05-06 PROCEDURE — 83036 HEMOGLOBIN GLYCOSYLATED A1C: CPT | Performed by: PATHOLOGY

## 2025-05-06 ASSESSMENT — PAIN SCALES - GENERAL: PAINLEVEL_OUTOF10: SEVERE PAIN (8)

## 2025-05-06 NOTE — PATIENT INSTRUCTIONS
Felicidades!  Lainez diabetes esta savannah controlado!  Les felicitco por lainez buen trabajo manejando lainez diabetes!    It is my privilege to be involved in the care of the above patient.     Marisabel Prieto PA-C, GARCIA  Diabetes, Endocrinology, and Metabolism  [phone] Appointments/Nurse Line  [phone] Fax  [phone] office  [email or web address]           You have earned this graduation by achieving diabetes related goals and stability.    It has been a pleasure serving you.  Please use the lessons learned in the diabetes clinic as a base on which to build a lifetime of better health and wellness.  You should continue to regularly follow up with your primary care provider for diabetes management.    Please schedule appointment with your primary care provider within the next 3 to 6 months of your graduation.   We have refilled all of your diabetes-related medication for the next year.    Future changes and refills should come from your primary care provider.     The diabetes care team remains available to you for future consultation should you and your doctor have new questions or concerns.      My best wishes,    Marisabel Prieto PA-C, [facility]  Diabetes, Endocrinology, and Metabolism  [phone] Appointments/Nurse  [phone] Fax

## 2025-05-06 NOTE — LETTER
5/6/2025       RE: Lucian Oliveira  [address] [facility] [address]     Dear Colleague,    Thank you for referring your patient, Lucian Oliveira, to the [facility] ENDOCRINOLOGY CLINIC [place] at [facility]. Please see a copy of my visit note below.        Lucian is a 68-year-old male with a complicated medical history including cardiac transplant in July 2020, chronic kidney disease stage IV, congestive heart failure, and a history of heparin-induced thrombocytopenia.  He has been hospitalized previously for having difficulty following diabetes regimen.  He does not read or write well in [place] and relies heavily on his wife Shivani for support with his medications.  His diabetes is also complicated by nonproliferative diabetic retinopathy.    Lucian and Shivani here in clinic today to follow-up his type 2 diabetes.    We last met in April and again discussed transition from NPH insulin to once daily basal insulin.  He and Shivani since have been meeting with Priscilla Mi to work on this transition.  The last met with her in August and at that point he was taking 60 units of Tresiba once daily Jardiance 10 mg daily, Trulicity 4.5 mg daily and Humalog with breakfast, but not always dinner and higher blood sugars in the afternoon related to snacking.  He continued to also use NPH insulin 50 units with breakfast and 13 units at bedtime.  They discussed the possibility of starting to use a Cequr device to cover his meals.    Today:  A1c today is 7.3%.    He is feeling very good about his blood sugar management.  He is now on just 2 types of insulin Tresiba and Humalog on fixed doses and feels things are going quite right.  Shivani noted that when they last picked up his Jardiance the dosage increased from 10 to 25 mg and she wants to be sure he can can take this.  Currently he has not yet started the prescription as he still has 10 mg tablets at  home.  He notes his blood sugars are much better they are giving Tresiba to 50 units each morning and NovoLog generally 10 units with each meal.  They have also started giving NovoLog 5 units on occasion when he has a snack like watermelon.  They check his blood sugar before each meal and if it is greater than 280 they will give 12 units if it is over 300 they will give 1314 or even 16 units.      They also are concerned about his weight loss.  He has not taken GLP 1 agonist for many months.  He feels his appetite is recently good but probably less than it was before.  He denies difficulties with denies that he is eating early satiety melena hematochezia any new or different pains or swelling.      DM medications:  Tresiba 60 units  Breakfast Humalog 10 - 16 with breakfast and 10 - 16  with lunch and 5 with dinner and any large snack.  Jardiance 10 mg daily    CGM data:  Average blood sugars 192 which correlates to an estimated GMI of 7.9% which is at goal for age.  Blood sugars rise especially after his morning meal and fall overnight into range.  Blood sugar is 46% in range without any hypoglycemia.      Objective:  Pleasant Cuban-speaking male in no acute distress though with notable weight loss.    Accompanied by his wife and both continues to be quiet knowledgeable about his current medication doses.    Mood is good affect is warm bright and appropriate.  respirations are easy and unlabored.   There is no jugular venous distention or other neck masses.  Skin is warm and moist.  Cranial nerves II through XII are grossly intact.  He ambulates independently with nonantalgic gait.  No swelling is appreciated in the lower extremities.   Skin is in reasonably good condition.  Nails are hypertrophic.  Feet are warm sensation is intact to monofilament testing throughout though at times difficult to ascertain as patient is quite ticklish.    Wt Readings from Last 10 Encounters:   05/06/25 73.5 kg (162 lb)   12/16/24  81.6 kg (180 lb)   12/05/24 81.6 kg (180 lb)   09/27/24 83.5 kg (184 lb)   09/17/24 83 kg (183 lb)   08/06/24 82 kg (180 lb 12.8 oz)   07/22/24 82.6 kg (182 lb)   04/16/24 79.8 kg (176 lb)   04/16/24 79.1 kg (174 lb 4.8 oz)   04/01/24 79 kg (174 lb 1.6 oz)           Recent Labs   Lab Test 05/06/25  1059 12/05/24  1306 12/05/24  1159 07/22/24  0711 04/16/24  1134 04/16/24  1033 08/14/23  0917 07/28/23  1226 08/10/20  0845 08/05/20  0843 07/07/20  1732 07/07/20  0720 08/16/18  0834 08/06/18  0000 04/30/18  1148 04/30/18  0000   A1C 7.1*  --  7.1* 7.3*   < >  --    < >  --    < >  --   --   --    < >  --   --   --    HEMOGLOBINA1  --   --   --   --   --   --   --   --   --   --   --   --   --  8.3*  --  10.6*   TSH  --   --   --   --   --   --   --   --   --  0.80  --  1.30   < >  --   --   --    LDL  --   --   --  49  --  46  --   --    < >  --   --   --    < >  --   --   --    HDL  --   --   --  31*  --  35*  --   --    < >  --   --   --    < >  --   --   --    TRIG  --   --   --  263*  --  246*  --   --    < >  --   --   --    < >  --   --   --    CR  --   --  2.35* 2.25*  --  2.22*   < >  --    < > 1.70*   < >  --    < >  --    < >  --    MICROL  --  183.0  --   --   --   --   --  21.4   < >  --   --   --    < >  --   --   --     < > = values in this interval not displayed.        Hemoglobin   Date Value Ref Range Status   12/05/2024 10.5 (L) 13.3 - 17.7 g/dL Final   05/11/2021 13.4 13.3 - 17.7 g/dL Final   ]    Cholesterol   Date Value Ref Range Status   07/22/2024 133 <200 mg/dL Final   01/05/2021 133 <200 mg/dL Final       GFR Estimate   Date Value Ref Range Status   12/05/2024 29 (L) >60 mL/min/1.73m2 Final     Comment:     eGFR calculated using 2021 CKD-EPI equation.   07/22/2024 31 (L) >60 mL/min/1.73m2 Final     Comment:     eGFR calculated using 2021 CKD-EPI equation.   04/16/2024 31 (L) >60 mL/min/1.73m2 Final   05/11/2021 37 (L) >60 mL/min/[1.73_m2] Final     Comment:     Non  GFR  Calc  Starting 12/18/2018, serum creatinine based estimated GFR (eGFR) will be   calculated using the Chronic Kidney Disease Epidemiology Collaboration   (CKD-EPI) equation.     03/02/2021 37 (L) >60 mL/min/[1.73_m2] Final     Comment:     Non  GFR Calc  Starting 12/18/2018, serum creatinine based estimated GFR (eGFR) will be   calculated using the Chronic Kidney Disease Epidemiology Collaboration   (CKD-EPI) equation.     01/14/2021 41 (L) >60 mL/min/[1.73_m2] Final     Comment:     Non  GFR Calc  Starting 12/18/2018, serum creatinine based estimated GFR (eGFR) will be   calculated using the Chronic Kidney Disease Epidemiology Collaboration   (CKD-EPI) equation.       Hemoglobin   Date Value Ref Range Status   12/05/2024 10.5 (L) 13.3 - 17.7 g/dL Final   05/11/2021 13.4 13.3 - 17.7 g/dL Final   ]    FIB-4 Calculation: 2.37 at 11/17/2023  5:56 AM  Calculated from:  SGOT/AST: 17 U/L at 10/25/2023 10:05 AM  SGPT/ALT: 13 U/L at 10/25/2023 10:05 AM  Platelets: 139 10e3/uL at 11/17/2023  5:56 AM  Age: 68 years      No hx elevated Amylase or lipase.     EXAMINATION: CT ABDOMEN PELVIS W/O CONTRAST, 10/11/2023 7:07 PM   Pancreas: Mild fatty atrophy of the pancreas. No focal mass or  dilation of the main pancreatic duct.    EXAMINATION: US ABDOMEN COMPLETE, 7/9/2019 6:54 AM   Pancreas: Visualized portions of the head and body of the pancreas are  unremarkable.     Assessment and plan:  Type 2 diabetes , well-controlled:  His blood sugar is now very well-controlled with an A1c today of 7.1% on Tresiba 50 units daily Jardiance 10 units daily and Humalog 10 to 16 units before meals.  He is eligible for graduation in our clinic and is in agreement with this.  He and his wife feel quite good about the work they have done to bring his blood sugar under control and are glad to see their primary care provider in follow-up for management of his diabetes and refills of diabetes medications.  He is  provided with 1 year of refills today.    Weight loss: Patient has long quit taking any GLP-1 agonist as he did not tolerate these.  Nonetheless he is now lost 18 pounds in the last 5 months.  Brief review of systems is not revealing, but I did urge him to see his primary care provider for further evaluation.  I note that his renal disease has continued to progress and he has hemoglobin has dropped but GFR remains in the 30s.  Loss is likely contributing to the improvement in glycemic management.     Dm Complications:  Foot exam updated today. No concerns.  Eye exam utd, will continue to be seen for retinopathy.   Has cardiology and nephrology follow-up -his Jardiance dose has been increased to 25 mg recently.  He will see nephrology next week and I will defer to them if they want to continue this at the higher dose or consider decreasing at GFR was at 29 in December.      Elevated fib 4: It does appear he is at risk for fatty liver, he has not wanted to continue GLP-1 therapy.  I will defer to his primary care provider from it management of the elevated fib 4.   Lucian is meeting all 5 community measures including A1c today of 7.1% and blood sugar values that are also at goal for age by CGMS and he was in agreement with graduating from clinic today.    Unless diabetes becomes uncontrolled again we will not be seeing him anymore in diabetes optimization clinic and care will be transferred back to primary care.      45 minutes spent on the date of the encounter doing chart review, history and exam, documentation, education and counseling, as well as communication and coordination of care, and further activities as noted above.  This time excludes time spent reviewing CGM.  It is my privilege to be involved in the care of the above patient.     Marisabel Prieto PA-C, MPAS  [facility]  Diabetes, Endocrinology, and Metabolism  [phone] Appointments/Nurse Line  [phone] Fax  [phone] pager  On  GOLDIE (inpatient)                                                04/28/25 1:14 PM  PATIENT LAB/IMAGING STATUS : No pending lab orders                               Again, thank you for allowing me to participate in the care of your patient.      Sincerely,    Marisabel Prieto PA-C

## 2025-05-07 ENCOUNTER — OFFICE VISIT (OUTPATIENT)
Dept: FAMILY MEDICINE | Facility: CLINIC | Age: 70
End: 2025-05-07
Payer: COMMERCIAL

## 2025-05-07 ENCOUNTER — OFFICE VISIT (OUTPATIENT)
Dept: OPHTHALMOLOGY | Facility: CLINIC | Age: 70
End: 2025-05-07
Attending: OPHTHALMOLOGY
Payer: COMMERCIAL

## 2025-05-07 ENCOUNTER — RESULTS FOLLOW-UP (OUTPATIENT)
Dept: TRANSPLANT | Facility: CLINIC | Age: 70
End: 2025-05-07

## 2025-05-07 VITALS
TEMPERATURE: 98.1 F | DIASTOLIC BLOOD PRESSURE: 55 MMHG | BODY MASS INDEX: 25.55 KG/M2 | RESPIRATION RATE: 12 BRPM | HEIGHT: 66 IN | OXYGEN SATURATION: 100 % | SYSTOLIC BLOOD PRESSURE: 88 MMHG | HEART RATE: 91 BPM | WEIGHT: 159 LBS

## 2025-05-07 DIAGNOSIS — E11.3513 TYPE 2 DIABETES MELLITUS WITH PROLIFERATIVE RETINOPATHY OF BOTH EYES AND MACULAR EDEMA, UNSPECIFIED WHETHER LONG TERM INSULIN USE (H): Primary | ICD-10-CM

## 2025-05-07 DIAGNOSIS — R19.7 DIARRHEA, UNSPECIFIED TYPE: Primary | ICD-10-CM

## 2025-05-07 DIAGNOSIS — Z79.4 TYPE 2 DIABETES MELLITUS WITH DIABETIC NEPHROPATHY, WITH LONG-TERM CURRENT USE OF INSULIN (H): ICD-10-CM

## 2025-05-07 DIAGNOSIS — H40.053 BORDERLINE GLAUCOMA OF BOTH EYES WITH OCULAR HYPERTENSION: ICD-10-CM

## 2025-05-07 DIAGNOSIS — E11.21 TYPE 2 DIABETES MELLITUS WITH DIABETIC NEPHROPATHY, WITH LONG-TERM CURRENT USE OF INSULIN (H): ICD-10-CM

## 2025-05-07 DIAGNOSIS — Z94.1 HEART REPLACED BY TRANSPLANT (H): ICD-10-CM

## 2025-05-07 DIAGNOSIS — N18.4 CKD (CHRONIC KIDNEY DISEASE) STAGE 4, GFR 15-29 ML/MIN (H): ICD-10-CM

## 2025-05-07 DIAGNOSIS — I48.92 ATRIAL FLUTTER, UNSPECIFIED TYPE (H): ICD-10-CM

## 2025-05-07 PROCEDURE — 1125F AMNT PAIN NOTED PAIN PRSNT: CPT | Performed by: FAMILY MEDICINE

## 2025-05-07 PROCEDURE — 99214 OFFICE O/P EST MOD 30 MIN: CPT | Performed by: FAMILY MEDICINE

## 2025-05-07 PROCEDURE — 3078F DIAST BP <80 MM HG: CPT | Performed by: FAMILY MEDICINE

## 2025-05-07 PROCEDURE — 3074F SYST BP LT 130 MM HG: CPT | Performed by: FAMILY MEDICINE

## 2025-05-07 PROCEDURE — G2211 COMPLEX E/M VISIT ADD ON: HCPCS | Performed by: FAMILY MEDICINE

## 2025-05-07 PROCEDURE — 250N000011 HC RX IP 250 OP 636: Performed by: OPHTHALMOLOGY

## 2025-05-07 PROCEDURE — 92235 FLUORESCEIN ANGRPH MLTIFRAME: CPT | Performed by: OPHTHALMOLOGY

## 2025-05-07 PROCEDURE — 92134 CPTRZ OPH DX IMG PST SGM RTA: CPT | Performed by: OPHTHALMOLOGY

## 2025-05-07 PROCEDURE — 67028 INJECTION EYE DRUG: CPT | Mod: RT | Performed by: OPHTHALMOLOGY

## 2025-05-07 RX ORDER — LATANOPROST 50 UG/ML
1 SOLUTION/ DROPS OPHTHALMIC DAILY
OUTPATIENT
Start: 2025-05-07

## 2025-05-07 RX ORDER — LATANOPROST 50 UG/ML
1 SOLUTION/ DROPS OPHTHALMIC DAILY
Qty: 5 ML | Refills: 11 | Status: SHIPPED | OUTPATIENT
Start: 2025-05-07

## 2025-05-07 RX ORDER — LOPERAMIDE HYDROCHLORIDE 2 MG/1
2 TABLET ORAL 4 TIMES DAILY PRN
Qty: 40 TABLET | Refills: 1 | Status: SHIPPED | OUTPATIENT
Start: 2025-05-07

## 2025-05-07 RX ADMIN — Medication 1.25 MG: at 12:26

## 2025-05-07 ASSESSMENT — TONOMETRY
IOP_METHOD: TONOPEN
OS_IOP_MMHG: 14
OD_IOP_MMHG: 13

## 2025-05-07 ASSESSMENT — VISUAL ACUITY
OD_CC+: +2
OS_CC: CF 1'
OD_CC: 20/40
METHOD: SNELLEN - LINEAR

## 2025-05-07 ASSESSMENT — EXTERNAL EXAM - RIGHT EYE: OD_EXAM: NORMAL

## 2025-05-07 ASSESSMENT — EXTERNAL EXAM - LEFT EYE: OS_EXAM: NORMAL

## 2025-05-07 ASSESSMENT — CONF VISUAL FIELD
OS_INFERIOR_NASAL_RESTRICTION: 1
OS_INFERIOR_TEMPORAL_RESTRICTION: 1
OS_SUPERIOR_NASAL_RESTRICTION: 1
METHOD: COUNTING FINGERS
OD_INFERIOR_TEMPORAL_RESTRICTION: 0
OD_SUPERIOR_TEMPORAL_RESTRICTION: 0
OD_INFERIOR_NASAL_RESTRICTION: 0
OS_SUPERIOR_TEMPORAL_RESTRICTION: 1
OD_NORMAL: 1
OD_SUPERIOR_NASAL_RESTRICTION: 0

## 2025-05-07 ASSESSMENT — SLIT LAMP EXAM - LIDS
COMMENTS: NORMAL
COMMENTS: NORMAL

## 2025-05-07 ASSESSMENT — PAIN SCALES - GENERAL: PAINLEVEL_OUTOF10: MILD PAIN (3)

## 2025-05-07 ASSESSMENT — ENCOUNTER SYMPTOMS: DIARRHEA: 1

## 2025-05-07 ASSESSMENT — CUP TO DISC RATIO: OD_RATIO: 0.25

## 2025-05-07 NOTE — PROGRESS NOTES
CC -   PDR and h/o RD    INTERVAL HISTORY - last visit 12/11/24. Vision feels stable. No flashes or floaters.     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 Last vitreous hemorrhage 9.2024  Presented to [facility] 2019 with VH OU,     s/p cardiac Txp 7/2020 for severe ischemic cardiomyopathy  CKD, HTn, DM II    PAST OCULAR SURGERY  PPV/MS/retinectomy/SO OS 12/2020  CE/IOL OD 2/2023  PRP fill-in OD  8/2022 & 10/2023      RETINAL IMAGING:  OCT 5/7/25   OD - resolved DME, vit opacities worse, PVD  OS - chronic ME and temporal atrophy     Fundus photos  Consistent with exam each eye     Fluorescein angiography: 5/7/25  Right eye - normal nerve and macula, peripheral Panretinal laser photocoagulation (PRP), small area of increased leakage SN;  no neovascularization elsewhere       ASSESSMENT & PLAN  # DMII  # PDR wtih DM II and DME OD   - s/p PRP last fill-in 10/2023   - given anti-VEGF for VH and Diabetic macular edema last injection    - quiescent on DFE    # recurrent VH right eye    - prior onset 2021 Tx with Avastin, resolved   - new onset 9/29/24   - last Avastin 1/2023   - advise repeat Avastin 10/1/24; 10/30/24 ; 12/11/24    - No new hemorrhages, no sign of neovascularization elsewhere or diabetic retinopathy changes, no Diabetic macular edema on OCT  Might need inj at intervals to prevent recurrent vitreous hemorrhage    - r/b/a IVT anti-VEGF d/w patient- infection, blindness, retinal detachment, cataract, bleeding, need for emergency procedures or surgery   - patient ok with resident or fellow performance of injection  05/07/25 plan for avastin inj right eye and will continue 3-4 months at intervals    # DME OD   - , used ketorolac in past   - anti-VEGF for VH in 2022 last injection 1/2023 Avastin   - worse on 7/2024 but better without Tx 8/2024   - worse today but decr VA likely 2/2 VH  12/11/24  Optical Coherence Tomography improved Diabetes mellitus   5/7/25 Optical Coherence Tomography with resolved DME      # PDR OS   - s/p PRP in 2019   - s/p PPV in 2020 for funnel RD after LTFU   - Avastin for NVG last 2022     - quiescent    # h/o RD OS   - s/p repair 2020 with oil   - retina flat    # h/o NVI/NVG OS   - s/p Avastin last 2022    # Ocular HTN   - IOP 15/21   - continue latanoprost       # h/o cardiac Txp    PLAN: Follow up 3-4 months with Optical Coherence Tomography and possible inj right eye     Norman Hung MD  PGY-3 Ophthalmology Resident  [facility]    ~~~~~~~~~~~~~~~~~~~~~~~~~~~~~~~~~~   Complete documentation of historical and exam elements from today's encounter can be found in the full encounter summary report (not reduplicated in this progress note).  I personally obtained the chief complaint(s) and history of present illness.  I confirmed and edited as necessary the review of systems, past medical/surgical history, family history, social history, and examination findings as documented by others; and I examined the patient myself.  I personally reviewed the relevant tests, images, and reports as documented above.  I formulated and edited as necessary the assessment and plan and discussed the findings and management plan with the patient and family and No resident or fellow assisted with the procedures performed.  I performed the procedures myself.    Triny Bunch MD  Professor of Ophthalmology  Vitreo-Retinal surgeon   Department of Ophthalmology and Visual Neurosciences   [facility]  Phone: [phone]   Fax: [phone]

## 2025-05-07 NOTE — PROGRESS NOTES
Assessment & Plan       ICD-10-CM    1. Diarrhea, unspecified type  R19.7 C. difficile Toxin B PCR with reflex to C. difficile EIA     loperamide (IMODIUM A-D) 2 MG tablet      2. Heart replaced by transplant (H)  Z94.1 Med Therapy Management Referral      3. CKD (chronic kidney disease) stage 4, GFR 15-29 ml/min (H)  N18.4       4. Atrial flutter, unspecified type (H)  I48.92       5. Type 2 diabetes mellitus with diabetic nephropathy, with long-term current use of insulin (H)  E11.21     Z79.4         I am suspicious that his diarrhea may be due to C. difficile, especially given his ongoing use of Septra DS  Discussed that certain medications could contribute to diarrhea including his Jardiance, magnesium, etc., but we will continue his same baseline meds for now and check for C. difficile  Discussed dietary management of this as well  He could cautiously use some Imodium for now  He is also overdue for repeat colonoscopy, so I advised him to schedule that    It seems like the patient and his wife are not well versed with his medications or his health care in general, part of which may be due to a language barrier, but I will refer him to [facility] to have them help him with understanding of his medications    If new, worsening or persistent symptoms, the patient is to call or return for a recheck    The longitudinal plan of care for the diagnosis(es)/condition(s) as documented were addressed during this visit. Due to the added complexity in care, I will continue to support Lucian in the subsequent management and with ongoing continuity of care.        Terry Epstein is a 69 year old, presenting for the following health issues:  Diarrhea      5/7/2025     3:43 PM   Additional Questions   Roomed by cam   Accompanied by wife     Via the Health Maintenance questionnaire, the patient has reported the following services have been completed -Colonscopy: [facility] 2024-10-30, this information has been sent to the  abstraction team.  Diarrhea    History of Present Illness       Reason for visit:  Diarrhea with weight loss  Symptom onset:  3-7 days ago  Symptoms include:  Stomach cramping and watery stools  Symptom intensity:  Severe  Symptom progression:  Staying the same  Had these symptoms before:  No  What makes it worse:  Eating  What makes it better:  Nothing   He is taking medications regularly.        He says he drank some orange juice this past Friday, 5 days ago, then started having diarrhea after that.  He is having about 5 loose watery stools per day.  No blood.  I note that he does take Septra DS 3 times per week.  No other recent antibiotic use.  He is on numerous baseline medications as below.  He has insulin requiring diabetes, history of heart disease, CKD, etc. as per his chart.  He has been losing weight with all of the diarrhea.  He was just seen yesterday by endocrinology for his diabetes.  His hemoglobin A1c was 7.3.    Review of his chart shows that he had a colonoscopy this past October, but had a poor prep and was advised to return in the near future to have it redone.  He never did do that.  In discussing his healthcare with the patient and his wife, it does not seem like he has a very good understanding of his health care issues or his medications.    Patient Active Problem List   Diagnosis    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)    Hyperlipidemia LDL goal <100    Hypertension goal BP (blood pressure) < 140/90    CHF (NYHA class II, ACC/AHA stage C) (H)    CKD (chronic kidney disease) stage 3, GFR 30-59 ml/min (H)    Automatic implantable cardioverter-defibrillator - [employer] single lead ICD, Not Dependent    Chronic systolic heart failure (H)    Proteinuria    Vitamin D deficiency    OA (osteoarthritis) of knee    Chondromalacia of patella, unspecified laterality    Pain in joint of left shoulder    Tinnitus    Ptosis of right eyelid    Secondary renal hyperparathyroidism     Cortical cataract of both eyes    Moderate nonproliferative diabetic retinopathy, without macular edema, associated with type 2 diabetes mellitus    CHANTEL (obstructive sleep apnea)- severe (AHI 30)    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, unspecified whether long term insulin use (H)    Nuclear cataract, nonsenile    Coronary artery disease involving native coronary artery of native heart without angina pectoris    Status post coronary angiogram    Dental caries    Heart transplant, orthotopic, status (H)    Heart replaced by transplant (H)    Need for prophylactic antibiotic    Traction detachment of left retina    Immunodeficiency, unspecified    CKD (chronic kidney disease) stage 4, GFR 15-29 ml/min (H)    Atrial flutter, unspecified type (H)    Nuclear senile cataract of right eye    Prostate cancer screening    Type 2 diabetes mellitus with diabetic nephropathy, with long-term current use of insulin (H)    Anemia of chronic renal failure, stage 4 (severe) (H)    Intermediate coronary syndrome (H)     Current Outpatient Medications   Medication Sig Dispense Refill    acetaminophen (TYLENOL) 325 MG tablet Take 3 tablets (975 mg) by mouth every 8 hours as needed for mild pain 60 tablet 3    Alcohol Swabs PADS Use to swab the area of the injection or sergio as directed   Per insurance coverage 100 each 0    aspirin (ASA) 81 MG chewable tablet Take 1 tablet (81 mg) by mouth daily 120 tablet 0    calcium carbonate-vitamin D (CALTRATE) 600-10 MG-MCG per tablet TAKE ONE TABLET BY MOUTH TWICE A DAY WITH MEALS 180 tablet 3    Continuous Glucose Sensor (DEXCOM G7 SENSOR) [facility] CADA 10 MCINTYRE 10 each 2    empagliflozin (JARDIANCE) 25 MG TABS tablet Take 1 tablet (25 mg) by mouth daily. 90 tablet 3    ferrous sulfate (FEROSUL) 325 (65 Fe) MG tablet Take 1 tablet (325 mg) by mouth 2 times daily (with meals) 180 tablet 3    furosemide (LASIX) 20 MG tablet Take 1 tablet (20  mg) by mouth daily. 90 tablet 3    HUMALOG KWIKPEN 100 UNIT/ML soln Give 10 units with breakfast, 10 units with lunch,  and 5 units with dinner.  Average daily use is 25 units 90 mL 3    Injection Device for insulin (CEQUR SIMPLICITY 2U) KALI 1 each See Admin Instructions. Change patch every 2-3 days 40 each 4    Injection Device for Insulin (CEQUR SIMPLICITY ) MISC 1 each See Admin Instructions. Use with patches. Change patch every 2-3 days 1 each 1    insulin degludec (TRESIBA) 200 UNIT/ML pen Inject 60 Units Subcutaneous every morning Order pen needles too 90 mL 11    insulin pen needle (32G X 4 MM) 32G X 4 MM miscellaneous Use 5-6 pen needles daily or as directed. 600 each 2    ketorolac (ACULAR) 0.5 % ophthalmic solution Place 1 drop into the right eye 2 times daily 10 mL 3    latanoprost (XALATAN) 0.005 % ophthalmic solution Place 1 drop into both eyes daily. 5 mL 11    lisinopril (ZESTRIL) 5 MG tablet Take 1 tablet (5 mg) by mouth daily. 100 tablet 0    loperamide (IMODIUM A-D) 2 MG tablet Take 1 tablet (2 mg) by mouth 4 times daily as needed for diarrhea. 40 tablet 1    magnesium oxide 400 MG tablet Take 1 tablet (400 mg) by mouth 2 times daily 180 tablet 3    mycophenolate (GENERIC EQUIVALENT) 500 MG tablet Take 3 tablets (1,500 mg) by mouth 2 times daily 180 tablet 11    omega-3 acid ethyl esters (LOVAZA) 1 g capsule TOME 1 CAPSULA POR LA BOCA CADA LISA 30 capsule 11    rosuvastatin (CRESTOR) 20 MG tablet Take 1 tablet (20 mg) by mouth daily. 100 tablet 0    sirolimus (GENERIC EQUIVALENT) 0.5 MG tablet Take 6 tablets (3 mg) by mouth daily 180 tablet 11    sulfamethoxazole-trimethoprim (BACTRIM) 400-80 MG tablet Take 1 tablet by mouth three times a week. 45 tablet 3    tamsulosin (FLOMAX) 0.4 MG capsule Take 1 capsule (0.4 mg) by mouth every morning. 90 capsule 3    bisacodyl (DULCOLAX) 5 MG EC tablet Two days prior to exam take two (2) tablets at 4pm. One day prior to exam take two (2) tablets at  "4pm (Patient not taking: Reported on 5/7/2025) 4 tablet 0    blood glucose (NO BRAND SPECIFIED) test strip Use to test blood sugar 4 times daily or as directed. (Patient not taking: Reported on 5/7/2025) 400 strip 3    blood glucose monitoring (ACCU-CHEK FELIPE SMARTVIEW) meter device kit Use to test blood sugar 3-4 times daily, as directed. (Patient not taking: Reported on 5/7/2025) 1 kit 0    blood glucose monitoring (SOFTCLIX) lancets 1 each 4 times daily Use to test blood sugars 2 times daily. (Patient not taking: Reported on 5/7/2025) 400 each 8    polyethylene glycol (GOLYTELY) 236 g suspension Two days before procedure at 5PM fill first container with water. Mix and drink an 8 oz glass every 15 minutes until HALF of the container is gone. Place the remainder in the refrigerator. One day before procedure at 5PM drink second half of bowel prep. Drink an 8 oz glass every 15 minutes until it is gone. Day of procedure 6 hours before arrival time fill the 2nd container with water. Mix and drink an 8 oz glass every 15 minutes until HALF of the container is gone. Discard the remaining solution. (Patient not taking: Reported on 5/7/2025) 8000 mL 0    senna-docusate (SENOKOT-S/PERICOLACE) 8.6-50 MG tablet Take 1 tablet by mouth 2 times daily as needed (hold for loose stools) (Patient not taking: Reported on 5/7/2025) 60 tablet 3     Current Facility-Administered Medications   Medication Dose Route Frequency Provider Last Rate Last Admin    bevacizumab (AVASTIN) intravitreal inj 1.25 mg  1.25 mg Intravitreal Q28 Days Triny Bunch MD   1.25 mg at 05/07/25 1226           Review of Systems  Mainly significant for the above.      Objective    BP (!) 88/55 (BP Location: Left arm, Patient Position: Chair, Cuff Size: Adult Regular)   Pulse 91   Temp 98.1  F (36.7  C) (Temporal)   Resp 12   Ht 1.676 m (5' 6\")   Wt 72.1 kg (159 lb)   SpO2 100%   BMI 25.66 kg/m    Body mass index is 25.66 kg/m .  Physical Exam "   GENERAL: alert and no distress  ABDOMEN: soft, nontender, no hepatosplenomegaly, no masses and bowel sounds normal    Past lab values and chart notes were reviewed.  His last colonoscopy report was reviewed.        Signed Electronically by: Fracisco Casiano MD

## 2025-05-07 NOTE — NURSING NOTE
"Chief Complaints and History of Present Illnesses   Patient presents with    Vitreous Hemorrhage Follow Up     Chief Complaint(s) and History of Present Illness(es)       Vitreous Hemorrhage Follow Up              Laterality: right eye    Associated symptoms: Negative for dryness, eye pain, floaters and itching    Pain scale: 0/10              Comments    Lucian is here to continue care for vitreous hemorrhage of right eye. He feels there in no change from last visit. Right lower eye feels \"sore\" he states.    Ivan Tejeda COT 10:30 AM May 7, 2025                      "

## 2025-05-07 NOTE — TELEPHONE ENCOUNTER
latanoprost (XALATAN) 0.005 % ophthalmic solution 5 mL 11 5/7/2025       Should have refills on file. Rx refill denied    Paola Hauser RN  P Red Flag Triage/MRT

## 2025-05-08 ENCOUNTER — RESULTS FOLLOW-UP (OUTPATIENT)
Dept: TRANSPLANT | Facility: CLINIC | Age: 70
End: 2025-05-08

## 2025-05-08 LAB — C DIFF TOX B STL QL: NEGATIVE

## 2025-05-08 NOTE — RESULT ENCOUNTER NOTE
Lucian,  Your test for C. difficile came back negative, so there is no need to treat your diarrhea with specific antibiotics for that.  You could continue dietary management and use of Imodium (loperamide) as needed.  Please obtain a follow-up colonoscopy as well, as we discussed.    Fracisco Casiano MD

## 2025-05-09 ENCOUNTER — APPOINTMENT (OUTPATIENT)
Dept: CT IMAGING | Facility: CLINIC | Age: 70
DRG: 393 | End: 2025-05-09
Attending: EMERGENCY MEDICINE
Payer: COMMERCIAL

## 2025-05-09 ENCOUNTER — RESULTS FOLLOW-UP (OUTPATIENT)
Dept: TRANSPLANT | Facility: CLINIC | Age: 70
End: 2025-05-09

## 2025-05-09 ENCOUNTER — HOSPITAL ENCOUNTER (INPATIENT)
Facility: CLINIC | Age: 70
End: 2025-05-09
Attending: EMERGENCY MEDICINE | Admitting: INTERNAL MEDICINE
Payer: COMMERCIAL

## 2025-05-09 DIAGNOSIS — N18.4 CKD (CHRONIC KIDNEY DISEASE) STAGE 4, GFR 15-29 ML/MIN (H): ICD-10-CM

## 2025-05-09 DIAGNOSIS — E83.42 HYPOMAGNESEMIA: ICD-10-CM

## 2025-05-09 DIAGNOSIS — R12 HEARTBURN: ICD-10-CM

## 2025-05-09 DIAGNOSIS — Z79.4 TYPE 2 DIABETES MELLITUS WITH DIABETIC NEPHROPATHY, WITH LONG-TERM CURRENT USE OF INSULIN (H): ICD-10-CM

## 2025-05-09 DIAGNOSIS — R19.7 DIARRHEA, UNSPECIFIED TYPE: ICD-10-CM

## 2025-05-09 DIAGNOSIS — D63.1 ANEMIA OF CHRONIC RENAL FAILURE, STAGE 4 (SEVERE) (H): ICD-10-CM

## 2025-05-09 DIAGNOSIS — N18.9 CHRONIC KIDNEY DISEASE, UNSPECIFIED CKD STAGE: ICD-10-CM

## 2025-05-09 DIAGNOSIS — Z79.4 TYPE 2 DIABETES MELLITUS WITH CHRONIC KIDNEY DISEASE, WITH LONG-TERM CURRENT USE OF INSULIN, UNSPECIFIED CKD STAGE (H): Primary | ICD-10-CM

## 2025-05-09 DIAGNOSIS — N18.4 ANEMIA OF CHRONIC RENAL FAILURE, STAGE 4 (SEVERE) (H): ICD-10-CM

## 2025-05-09 DIAGNOSIS — E86.0 DEHYDRATION: ICD-10-CM

## 2025-05-09 DIAGNOSIS — Z94.1 HEART REPLACED BY TRANSPLANT (H): ICD-10-CM

## 2025-05-09 DIAGNOSIS — E83.39 HYPOPHOSPHATEMIA: ICD-10-CM

## 2025-05-09 DIAGNOSIS — E87.6 HYPOKALEMIA: ICD-10-CM

## 2025-05-09 DIAGNOSIS — E11.3513 TYPE 2 DIABETES MELLITUS WITH PROLIFERATIVE RETINOPATHY OF BOTH EYES AND MACULAR EDEMA, UNSPECIFIED WHETHER LONG TERM INSULIN USE (H): ICD-10-CM

## 2025-05-09 DIAGNOSIS — E11.22 TYPE 2 DIABETES MELLITUS WITH CHRONIC KIDNEY DISEASE, WITH LONG-TERM CURRENT USE OF INSULIN, UNSPECIFIED CKD STAGE (H): Primary | ICD-10-CM

## 2025-05-09 DIAGNOSIS — E11.21 TYPE 2 DIABETES MELLITUS WITH DIABETIC NEPHROPATHY, WITH LONG-TERM CURRENT USE OF INSULIN (H): ICD-10-CM

## 2025-05-09 DIAGNOSIS — N17.9 ACUTE KIDNEY INJURY: ICD-10-CM

## 2025-05-09 DIAGNOSIS — K21.9 GASTROESOPHAGEAL REFLUX DISEASE WITHOUT ESOPHAGITIS: ICD-10-CM

## 2025-05-09 LAB
ALBUMIN SERPL BCG-MCNC: 4.6 G/DL (ref 3.5–5.2)
ALP SERPL-CCNC: 174 U/L (ref 40–150)
ALT SERPL W P-5'-P-CCNC: 9 U/L (ref 0–70)
ANION GAP SERPL CALCULATED.3IONS-SCNC: 23 MMOL/L (ref 7–15)
AST SERPL W P-5'-P-CCNC: 13 U/L (ref 0–45)
BASOPHILS # BLD AUTO: 0 10E3/UL (ref 0–0.2)
BASOPHILS NFR BLD AUTO: 0 %
BILIRUB SERPL-MCNC: 0.3 MG/DL
BUN SERPL-MCNC: 93.8 MG/DL (ref 8–23)
CALCIUM SERPL-MCNC: 8.6 MG/DL (ref 8.8–10.4)
CHLORIDE SERPL-SCNC: 92 MMOL/L (ref 98–107)
CREAT SERPL-MCNC: 4.88 MG/DL (ref 0.67–1.17)
EGFRCR SERPLBLD CKD-EPI 2021: 12 ML/MIN/1.73M2
EOSINOPHIL # BLD AUTO: 0 10E3/UL (ref 0–0.7)
EOSINOPHIL NFR BLD AUTO: 0 %
ERYTHROCYTE [DISTWIDTH] IN BLOOD BY AUTOMATED COUNT: 16 % (ref 10–15)
GLUCOSE BLDC GLUCOMTR-MCNC: 156 MG/DL (ref 70–99)
GLUCOSE SERPL-MCNC: 194 MG/DL (ref 70–99)
HCO3 SERPL-SCNC: 13 MMOL/L (ref 22–29)
HCT VFR BLD AUTO: 32.9 % (ref 40–53)
HGB BLD-MCNC: 11 G/DL (ref 13.3–17.7)
IMM GRANULOCYTES # BLD: 0.1 10E3/UL
IMM GRANULOCYTES NFR BLD: 1 %
LYMPHOCYTES # BLD AUTO: 1 10E3/UL (ref 0.8–5.3)
LYMPHOCYTES NFR BLD AUTO: 8 %
MAGNESIUM SERPL-MCNC: 1.7 MG/DL (ref 1.7–2.3)
MCH RBC QN AUTO: 26.8 PG (ref 26.5–33)
MCHC RBC AUTO-ENTMCNC: 33.4 G/DL (ref 31.5–36.5)
MCV RBC AUTO: 80 FL (ref 78–100)
MONOCYTES # BLD AUTO: 0.6 10E3/UL (ref 0–1.3)
MONOCYTES NFR BLD AUTO: 6 %
NEUTROPHILS # BLD AUTO: 9.8 10E3/UL (ref 1.6–8.3)
NEUTROPHILS NFR BLD AUTO: 85 %
NRBC # BLD AUTO: 0 10E3/UL
NRBC BLD AUTO-RTO: 0 /100
NT-PROBNP SERPL-MCNC: 1495 PG/ML (ref 0–229)
PLATELET # BLD AUTO: 323 10E3/UL (ref 150–450)
POTASSIUM SERPL-SCNC: 2.6 MMOL/L (ref 3.4–5.3)
POTASSIUM SERPL-SCNC: 3.3 MMOL/L (ref 3.4–5.3)
PROT SERPL-MCNC: 7.7 G/DL (ref 6.4–8.3)
RBC # BLD AUTO: 4.1 10E6/UL (ref 4.4–5.9)
SODIUM SERPL-SCNC: 128 MMOL/L (ref 135–145)
TROPONIN T SERPL HS-MCNC: 58 NG/L
TROPONIN T SERPL HS-MCNC: 60 NG/L
WBC # BLD AUTO: 11.5 10E3/UL (ref 4–11)

## 2025-05-09 PROCEDURE — 36415 COLL VENOUS BLD VENIPUNCTURE: CPT | Performed by: PHYSICIAN ASSISTANT

## 2025-05-09 PROCEDURE — 258N000003 HC RX IP 258 OP 636: Performed by: PHYSICIAN ASSISTANT

## 2025-05-09 PROCEDURE — 84075 ASSAY ALKALINE PHOSPHATASE: CPT | Performed by: EMERGENCY MEDICINE

## 2025-05-09 PROCEDURE — 250N000011 HC RX IP 250 OP 636: Performed by: EMERGENCY MEDICINE

## 2025-05-09 PROCEDURE — 93010 ELECTROCARDIOGRAM REPORT: CPT | Performed by: EMERGENCY MEDICINE

## 2025-05-09 PROCEDURE — 85025 COMPLETE CBC W/AUTO DIFF WBC: CPT | Performed by: EMERGENCY MEDICINE

## 2025-05-09 PROCEDURE — 99284 EMERGENCY DEPT VISIT MOD MDM: CPT | Performed by: EMERGENCY MEDICINE

## 2025-05-09 PROCEDURE — 250N000011 HC RX IP 250 OP 636: Performed by: PHYSICIAN ASSISTANT

## 2025-05-09 PROCEDURE — 250N000012 HC RX MED GY IP 250 OP 636 PS 637: Performed by: PHYSICIAN ASSISTANT

## 2025-05-09 PROCEDURE — 96374 THER/PROPH/DIAG INJ IV PUSH: CPT | Performed by: EMERGENCY MEDICINE

## 2025-05-09 PROCEDURE — 99222 1ST HOSP IP/OBS MODERATE 55: CPT | Mod: FS | Performed by: INTERNAL MEDICINE

## 2025-05-09 PROCEDURE — 83880 ASSAY OF NATRIURETIC PEPTIDE: CPT | Performed by: EMERGENCY MEDICINE

## 2025-05-09 PROCEDURE — 250N000013 HC RX MED GY IP 250 OP 250 PS 637: Performed by: PHYSICIAN ASSISTANT

## 2025-05-09 PROCEDURE — 82962 GLUCOSE BLOOD TEST: CPT

## 2025-05-09 PROCEDURE — 84132 ASSAY OF SERUM POTASSIUM: CPT | Performed by: PHYSICIAN ASSISTANT

## 2025-05-09 PROCEDURE — 93005 ELECTROCARDIOGRAM TRACING: CPT | Performed by: EMERGENCY MEDICINE

## 2025-05-09 PROCEDURE — 74176 CT ABD & PELVIS W/O CONTRAST: CPT

## 2025-05-09 PROCEDURE — 99207 PR APP CREDIT; MD BILLING SHARED VISIT: CPT | Performed by: PHYSICIAN ASSISTANT

## 2025-05-09 PROCEDURE — 74176 CT ABD & PELVIS W/O CONTRAST: CPT | Mod: 26 | Performed by: RADIOLOGY

## 2025-05-09 PROCEDURE — 99285 EMERGENCY DEPT VISIT HI MDM: CPT | Mod: 25 | Performed by: EMERGENCY MEDICINE

## 2025-05-09 PROCEDURE — 36415 COLL VENOUS BLD VENIPUNCTURE: CPT | Performed by: EMERGENCY MEDICINE

## 2025-05-09 PROCEDURE — 120N000011 HC R&B TRANSPLANT UMMC

## 2025-05-09 PROCEDURE — 83735 ASSAY OF MAGNESIUM: CPT | Performed by: EMERGENCY MEDICINE

## 2025-05-09 PROCEDURE — 82784 ASSAY IGA/IGD/IGG/IGM EACH: CPT | Performed by: INTERNAL MEDICINE

## 2025-05-09 PROCEDURE — 84484 ASSAY OF TROPONIN QUANT: CPT | Performed by: EMERGENCY MEDICINE

## 2025-05-09 RX ORDER — DEXTROSE MONOHYDRATE 25 G/50ML
25-50 INJECTION, SOLUTION INTRAVENOUS
Status: DISCONTINUED | OUTPATIENT
Start: 2025-05-09 | End: 2025-05-17 | Stop reason: HOSPADM

## 2025-05-09 RX ORDER — LISINOPRIL 5 MG/1
5 TABLET ORAL DAILY
Status: DISCONTINUED | OUTPATIENT
Start: 2025-05-10 | End: 2025-05-17 | Stop reason: HOSPADM

## 2025-05-09 RX ORDER — SULFAMETHOXAZOLE AND TRIMETHOPRIM 400; 80 MG/1; MG/1
1 TABLET ORAL
Status: DISCONTINUED | OUTPATIENT
Start: 2025-05-10 | End: 2025-05-17 | Stop reason: HOSPADM

## 2025-05-09 RX ORDER — TAMSULOSIN HYDROCHLORIDE 0.4 MG/1
0.4 CAPSULE ORAL EVERY MORNING
Status: DISCONTINUED | OUTPATIENT
Start: 2025-05-10 | End: 2025-05-17 | Stop reason: HOSPADM

## 2025-05-09 RX ORDER — ACETAMINOPHEN 325 MG/1
975 TABLET ORAL EVERY 8 HOURS PRN
Status: DISCONTINUED | OUTPATIENT
Start: 2025-05-09 | End: 2025-05-09

## 2025-05-09 RX ORDER — CALCIUM CARBONATE/VITAMIN D3 600 MG-10
1 TABLET ORAL 2 TIMES DAILY WITH MEALS
Status: DISCONTINUED | OUTPATIENT
Start: 2025-05-09 | End: 2025-05-17 | Stop reason: HOSPADM

## 2025-05-09 RX ORDER — MYCOPHENOLATE MOFETIL 500 MG/1
1500 TABLET ORAL
Status: DISCONTINUED | OUTPATIENT
Start: 2025-05-09 | End: 2025-05-10

## 2025-05-09 RX ORDER — LATANOPROST 50 UG/ML
1 SOLUTION/ DROPS OPHTHALMIC EVERY EVENING
Status: DISCONTINUED | OUTPATIENT
Start: 2025-05-09 | End: 2025-05-17 | Stop reason: HOSPADM

## 2025-05-09 RX ORDER — POTASSIUM CHLORIDE 7.45 MG/ML
10 INJECTION INTRAVENOUS ONCE
Status: COMPLETED | OUTPATIENT
Start: 2025-05-09 | End: 2025-05-09

## 2025-05-09 RX ORDER — ACETAMINOPHEN 325 MG/1
975 TABLET ORAL 3 TIMES DAILY
Status: DISCONTINUED | OUTPATIENT
Start: 2025-05-09 | End: 2025-05-17 | Stop reason: HOSPADM

## 2025-05-09 RX ORDER — ASPIRIN 81 MG/1
81 TABLET, CHEWABLE ORAL DAILY
Status: DISCONTINUED | OUTPATIENT
Start: 2025-05-10 | End: 2025-05-17 | Stop reason: HOSPADM

## 2025-05-09 RX ORDER — CALCIUM CARBONATE 500 MG/1
1000 TABLET, CHEWABLE ORAL 4 TIMES DAILY PRN
Status: DISCONTINUED | OUTPATIENT
Start: 2025-05-09 | End: 2025-05-17 | Stop reason: HOSPADM

## 2025-05-09 RX ORDER — POTASSIUM CHLORIDE 7.45 MG/ML
10 INJECTION INTRAVENOUS ONCE
Status: COMPLETED | OUTPATIENT
Start: 2025-05-09 | End: 2025-05-10

## 2025-05-09 RX ORDER — ROSUVASTATIN CALCIUM 10 MG/1
10 TABLET, COATED ORAL DAILY
Status: DISCONTINUED | OUTPATIENT
Start: 2025-05-10 | End: 2025-05-17 | Stop reason: HOSPADM

## 2025-05-09 RX ORDER — MAGNESIUM OXIDE 400 MG/1
400 TABLET ORAL 2 TIMES DAILY
Status: DISCONTINUED | OUTPATIENT
Start: 2025-05-09 | End: 2025-05-12

## 2025-05-09 RX ORDER — NICOTINE POLACRILEX 4 MG
15-30 LOZENGE BUCCAL
Status: DISCONTINUED | OUTPATIENT
Start: 2025-05-09 | End: 2025-05-17 | Stop reason: HOSPADM

## 2025-05-09 RX ORDER — LIDOCAINE 40 MG/G
CREAM TOPICAL
Status: DISCONTINUED | OUTPATIENT
Start: 2025-05-09 | End: 2025-05-17 | Stop reason: HOSPADM

## 2025-05-09 RX ORDER — FERROUS SULFATE 325(65) MG
325 TABLET ORAL 2 TIMES DAILY WITH MEALS
Status: DISCONTINUED | OUTPATIENT
Start: 2025-05-09 | End: 2025-05-17 | Stop reason: HOSPADM

## 2025-05-09 RX ADMIN — CALCIUM CARBONATE 600 MG (1,500 MG)-VITAMIN D3 400 UNIT TABLET 1 TABLET: at 21:39

## 2025-05-09 RX ADMIN — FERROUS SULFATE TAB 325 MG (65 MG ELEMENTAL FE) 325 MG: 325 (65 FE) TAB at 21:39

## 2025-05-09 RX ADMIN — POTASSIUM CHLORIDE 10 MEQ: 7.46 INJECTION, SOLUTION INTRAVENOUS at 21:40

## 2025-05-09 RX ADMIN — SIROLIMUS 3 MG: 2 TABLET ORAL at 21:36

## 2025-05-09 RX ADMIN — LATANOPROST 1 DROP: 50 SOLUTION OPHTHALMIC at 21:49

## 2025-05-09 RX ADMIN — POTASSIUM CHLORIDE 10 MEQ: 7.46 INJECTION, SOLUTION INTRAVENOUS at 20:35

## 2025-05-09 RX ADMIN — POTASSIUM CHLORIDE 10 MEQ: 7.46 INJECTION, SOLUTION INTRAVENOUS at 17:43

## 2025-05-09 RX ADMIN — SODIUM CHLORIDE, SODIUM LACTATE, POTASSIUM CHLORIDE, AND CALCIUM CHLORIDE 1000 ML: .6; .31; .03; .02 INJECTION, SOLUTION INTRAVENOUS at 18:55

## 2025-05-09 RX ADMIN — MYCOPHENOLATE MOFETIL 1500 MG: 500 TABLET, FILM COATED ORAL at 21:39

## 2025-05-09 RX ADMIN — MAGNESIUM OXIDE TAB 400 MG (241.3 MG ELEMENTAL MG) 400 MG: 400 (241.3 MG) TAB at 21:39

## 2025-05-09 ASSESSMENT — ACTIVITIES OF DAILY LIVING (ADL)
ADLS_ACUITY_SCORE: 58

## 2025-05-09 ASSESSMENT — COLUMBIA-SUICIDE SEVERITY RATING SCALE - C-SSRS
2. HAVE YOU ACTUALLY HAD ANY THOUGHTS OF KILLING YOURSELF IN THE PAST MONTH?: NO
6. HAVE YOU EVER DONE ANYTHING, STARTED TO DO ANYTHING, OR PREPARED TO DO ANYTHING TO END YOUR LIFE?: NO
1. IN THE PAST MONTH, HAVE YOU WISHED YOU WERE DEAD OR WISHED YOU COULD GO TO SLEEP AND NOT WAKE UP?: NO

## 2025-05-09 NOTE — ED PROVIDER NOTES
ED PROVIDER NOTE  May 9, 2025  History     Chief Complaint   Patient presents with    Abdominal Pain    Diarrhea     HPI  Lucian Oliveira is a 69 year old male who has a history significant for CAD status post orthotopic heart transplantation in 2020, diabetes, chronic kidney disease.  He arrives today to the emergency department evaluation of ongoing diarrhea and abdominal pain.  Patient reports onset of symptoms 8 days ago.  This has been persistent with episodes of loose stool approximately 8 times per day.  He reports increasing fatigue and generalized weakness associated with this.  He reports no other sick contacts or other family members with diarrhea.  No recent antibiotics or overseas travel per patient.  He reports no associated fever or chills.  No upper GI loss such as nausea or vomiting.  He reports no melena or hematochezia.  He does report modest abdominal pain primarily in the bilateral lower quadrants greater on the right versus left.  No recent trauma.  He denies dysuria urinary frequency.  No new rash.  Patient was seen recently at an outside facility and had negative C. difficile testing.      Past Medical History  Past Medical History:   Diagnosis Date    CAD (coronary artery disease)     CHF (congestive heart failure) (H)     CKD (chronic kidney disease), stage III (H)     Cortical cataract of both eyes     Diabetes (H)     E. coli colitis 10/12/2023    Hyperlipidemia     Hypertension     Infection due to Streptococcus mitis group 09/23/2020    Ischemic cardiomyopathy     Norovirus 10/12/2023    Obesity     CHANTEL (obstructive sleep apnea)     occas cpap    CHANTEL (obstructive sleep apnea)- severe (AHI 30)     Osteoarthritis      Past Surgical History:   Procedure Laterality Date    CATARACT IOL, RT/LT      COLONOSCOPY N/A 8/7/2019    Procedure: COLONOSCOPY, WITH POLYPECTOMY AND BIOPSY;  Surgeon: Chauncey Morataya MD;  Location:  GI    COLONOSCOPY N/A 5/12/2023    Procedure:  Colonoscopy;  Surgeon: Mariano Velasquez MD;  [facility]    COLONOSCOPY N/A 10/8/2024    Procedure: Colonoscopy;  Surgeon: Rip Bryan MD;  [facility]: [facility]    CV ANGIOGRAM CORONARY GRAFT N/A 7/2/2020    Procedure: Angiogram Coronary Graft;  Surgeon: Alex Lantigua MD;  [facility]    CV CORONARY ANGIOGRAM N/A 7/2/2020    Procedure: CV CORONARY ANGIOGRAM;  Surgeon: Alex Lantigua MD;  [facility]    CV CORONARY ANGIOGRAM N/A 7/20/2021    Procedure: CV CORONARY ANGIOGRAM;  Surgeon: Raimundo Hudson MD;  [facility]    CV CORONARY ANGIOGRAM N/A 9/12/2022    Procedure: Coronary Angiogram- BIPLANE;  Surgeon: Raimundo Hudson MD;  [facility]    CV CORONARY ANGIOGRAM N/A 7/17/2023    Procedure: Coronary Angiogram;  Surgeon: Alex Lantigua MD;  [facility]    CV HEART BIOPSY N/A 7/27/2020    Procedure: Heart Biopsy;  Surgeon: Mario Burr MD;  [facility]    CV HEART BIOPSY N/A 8/3/2020    Procedure: CV HEART BIOPSY;  Surgeon: Alex Lantigua MD;  [facility]    CV HEART BIOPSY N/A 8/10/2020    Procedure: CV HEART BIOPSY;  Surgeon: Mario Burr MD;  [facility]    CV HEART BIOPSY N/A 8/17/2020    Procedure: CV HEART BIOPSY;  Surgeon: Raimundo Hudson MD;  [facility]    CV HEART BIOPSY N/A 8/31/2020    Procedure: CV HEART BIOPSY;  Surgeon: Moises Santos MD;  [facility]    CV HEART BIOPSY N/A 9/14/2020    Procedure: CV HEART BIOPSY;  Surgeon: Raimundo Hudson MD;  [facility]    CV HEART BIOPSY N/A 9/28/2020    Procedure: CV HEART BIOPSY;  Surgeon: Raimundo Hudson MD;  Location:  [facility]ARD[facility] CATH LAB    CV HEART BIOPSY N/A  10/12/2020    Procedure: CV HEART BIOPSY;  Surgeon: John Stuart MD;  [facility]    CV HEART BIOPSY N/A 11/10/2020    Procedure: CV HEART BIOPSY;  Surgeon: John Stuart MD;  [facility]    CV HEART BIOPSY N/A 12/7/2020    Procedure: CV HEART BIOPSY;  Surgeon: Raimundo Hudson MD;  Location:  [facility]ARD[facility] CATH [facility]    CV HEART BIOPSY N/A 1/5/2021    Procedure: CV HEART BIOPSY;  Surgeon: Raimundo Hudson MD;  Location:  [facility] CARDI[facility] CATH LAB    CV HEART BIOPSY N/A 7/20/2021    Procedure: CV HEART BIOPSY;  Surgeon: Raimundo Hudson MD;  Location:  [facility]ARD[facility] CATH [facility]    CV HEART BIOPSY N/A 9/28/2021    Procedure: CV HEART BIOPSY;  Surgeon: Raimundo Hudson MD;  Location:  [facility]ARD[facility] CATH LAB    CV HEART BIOPSY N/A 9/12/2022    Procedure: Heart Biopsy;  Surgeon: Raimundo Hudson MD;  Location:  [facility]ARD[facility] CATH [facility]    CV HEART BIOPSY N/A 7/17/2023    Procedure: Heart Biopsy;  Surgeon: Alex Lantigua MD;  Location:  [facility]ARDIAC CATH LAB    CV INTRA AORTIC BALLOON N/A 7/14/2020    Procedure: RHC WITH LEAVE IN SWAN/IABP;  Surgeon: Sonny Saleh MD;  Location:  HE[facility] CARDIAC CATH LAB    CV INTRAVASULAR ULTRASOUND N/A 9/12/2022    Procedure: Intravascular Ultrasound;  Surgeon: Raimundo Hudson MD;  Location:  HE[facility] CARDIAC CATH LAB    CV RIGHT HEART CATH MEASUREMENTS RECORDED N/A 3/25/2019    Procedure: CV RIGHT HEART CATH;  Surgeon: Moises Santos MD;  Location:  [facility] CARDIAC CATH LAB    CV RIGHT HEART CATH MEASUREMENTS RECORDED N/A 7/10/2019    Procedure: CV RIGHT HEART CATH;  Surgeon: Jak Mccabe MD;  Location:  HE[facility] CARDIAC CATH LAB    CV RIGHT HEART CATH MEASUREMENTS RECORDED N/A 7/8/2020    Procedure: Right Heart Cath with Leave In Scheller already has ICU load;  Surgeon: Jak Mccabe MD;  Location:  HE[facility] CARDIAC CATH LAB     CV RIGHT HEART CATH MEASUREMENTS RECORDED N/A 7/14/2020    Procedure: CV RIGHT HEART CATH;  Surgeon: Sonny Saleh MD;  [facility]    CV RIGHT HEART CATH MEASUREMENTS RECORDED N/A 7/2/2020    Procedure: CV RIGHT HEART CATH;  Surgeon: Alex Lantigua MD;  Location:  [facility]ARD[facility] CATH LAB    CV RIGHT HEART CATH MEASUREMENTS RECORDED N/A 7/27/2020    Procedure: Right Heart Cath;  Surgeon: Mario Burr MD;  Location:  [facility] CARD[facility] CATH LAB    CV RIGHT HEART CATH MEASUREMENTS RECORDED N/A 8/3/2020    Procedure: Right Heart Cath;  Surgeon: Alex Lantigua MD;  Location:  [facility] CARDI[facility] CATH LAB    CV RIGHT HEART CATH MEASUREMENTS RECORDED N/A 8/10/2020    Procedure: CV RIGHT HEART CATH;  Surgeon: Mario Burr MD;  Location:  HEART CARDIAC CATH LAB    CV RIGHT HEART CATH MEASUREMENTS RECORDED N/A 8/17/2020    Procedure: CV RIGHT HEART CATH;  Surgeon: Raimundo Hudson MD;  Location:  HE[facility] CARD[facility] CATH LAB    CV RIGHT HEART CATH MEASUREMENTS RECORDED N/A 8/31/2020    Procedure: CV RIGHT HEART CATH;  Surgeon: Moises Santos MD;  Location:  HE[facility] CARDIAC CATH LAB    CV RIGHT HEART CATH MEASUREMENTS RECORDED N/A 9/14/2020    Procedure: CV RIGHT HEART CATH;  Surgeon: Raimundo Hudson MD;  Location:  [facility] CARD[facility] CATH [facility]    CV RIGHT HEART CATH MEASUREMENTS RECORDED N/A 9/28/2020    Procedure: CV RIGHT HEART CATH;  Surgeon: Raimundo Hudson MD;  Location:  [facility]ARD[facility] CATH LAB    CV RIGHT HEART CATH MEASUREMENTS RECORDED N/A 10/12/2020    Procedure: CV RIGHT HEART CATH;  Surgeon: John Stuart MD;  Location:  HE[facility] CARDI[facility] CATH LAB    CV RIGHT HEART CATH MEASUREMENTS RECORDED N/A 11/10/2020    Procedure: CV RIGHT HEART CATH;  Surgeon: John Stuart MD;  Location:  [facility] CARDI[facility] CATH LAB    CV RIGHT HEART CATH MEASUREMENTS RECORDED N/A 12/7/2020    Procedure: CV RIGHT HEART CATH;  Surgeon: Tristan  Raimundo Padgett MD;  Location:  HE[facility] CARDI[facility] CATH LAB    CV RIGHT HEART CATH MEASUREMENTS RECORDED N/A 1/5/2021    Procedure: CV RIGHT HEART CATH;  Surgeon: Raimundo Hudson MD;  Location:  [facility]ARD[facility] CATH [facility]    CV RIGHT HEART CATH MEASUREMENTS RECORDED N/A 7/20/2021    Procedure: CV RIGHT HEART CATH;  Surgeon: Raimundo Hudson MD;  Location:  HE[facility] CARD[facility] CATH LAB    CV RIGHT HEART CATH MEASUREMENTS RECORDED N/A 9/28/2021    Procedure: CV RIGHT HEART CATH;  Surgeon: Raimundo Hudson MD;  Location:  [facility] CARD[facility] CATH LAB    CV RIGHT HEART CATH MEASUREMENTS RECORDED N/A 9/12/2022    Procedure: Right Heart Catheterization;  Surgeon: Raimundo Hudson MD;  Location:  [facility] CARD[facility] CATH LAB    CV RIGHT HEART CATH MEASUREMENTS RECORDED N/A 7/17/2023    Procedure: Right Heart Catheterization;  Surgeon: Alex Lantigua MD;  Location:  [facility] CARD[facility] CATH LAB    ESOPHAGOSCOPY, GASTROSCOPY, DUODENOSCOPY (EGD), COMBINED N/A 10/8/2024    Procedure: ESOPHAGOGASTRODUODENOSCOPY, WITH BIOPSY;  Surgeon: Rip Bryan MD;  Location:  GI    EXTRACTION(S) DENTAL Left 7/13/2020    Procedure: EXTRACTION, TOOTH #11, 12, 13, 15, and 29;  Surgeon: Monica Chao DDS;  [facility]    IMPLANT AUTOMATIC IMPLANTABLE CARDIOVERTER DEFIBRILLATOR      INCISION AND DRAINAGE STERNUM W/ IRRIGATION SYSTEM, COMBINED N/A 9/23/2020    Procedure: INCISION AND DRAINAGE, LEFT SUPRACLAVICULAR WOUND INFECTION AND ABDOMENN WOUND.;  Surgeon: Ran Huertas MD;  [facility]    INSERT INTRAAORTIC BALLOON PUMP N/A 7/16/2020    Procedure: Subclavian Intra-Aortic Balloon Pump Placement;  Surgeon: Allan Sparrow MD;  [facility]    IRRIGATION AND DEBRIDEMENT CHEST WASHOUT, COMBINED N/A 9/28/2020    Procedure: IRRIGATION AND DEBRIDEMENT OF LEFT UPPER CHEST WOUND.;  Surgeon: Ran Huertas MD;  [facility]    PHACOEMULSIFICATION CLEAR CORNEA WITH STANDARD  INTRAOCULAR LENS IMPLANT Right 2/6/2023    Procedure: RIGHT EYE PHACOEMULSIFICATION, CATARACT, WITH INTRAOCULAR LENS IMPLANT with trypan blue;  Surgeon: Triny Bunch MD;  [facility]    PHACOEMULSIFICATION CLEAR CORNEA WITH STANDARD IOL, VITRECTOMY PARSPLANA 25 GAGUE, COMBINED Left 12/10/2019    Procedure: PHACOEMULSIFICATION, CATARACT, CLEAR CORNEAL INCISION APPROACH, W STD INTRAOCULAR LENS IMPLANT INSERT + VITRECTOMY BY PARS PLANA  USING 25-GAUGE INSTRUMENTS. ENDOLASER, INFUSION OF 20% SF6 GAS;  Surgeon: Triny Bunch MD;  [facility]    PICC DOUBLE LUMEN PLACEMENT Left 07/28/2020    5Fr - 42cm, Basilic vein, mid SVC    PICC INSERTION Right 07/11/2020    basilic 44 cm total     TRANSPLANT HEART RECIPIENT N/A 7/19/2020    Procedure: REDO MEDIAN STERNOTOMY, TRANSPLANT, ORTHOTOPIC HEART, RECIPIENT, ON PUMP OXYGENATOR, REMOVAL OF CARDIAC DEFIBRILLATOR AND LEAD;  Surgeon: Griselli, Massimo, MD;  [facility]    VITRECTOMY, PARS PLANA APPROACH, USING 27-GAUGE INSTRUMENTS Left 12/21/2020    Procedure: 27 gauge pars plana vitectomy, membrane peel, perfluoroctane liquid (PFO), retinectomy, endolaser, Silicone Oil 1000 cs;  Surgeon: Triny Bunch MD;  [facility]    ZZC CABG, ARTERY-VEIN, THREE  02/2008     acetaminophen (TYLENOL) 325 MG tablet  Alcohol Swabs PADS  aspirin (ASA) 81 MG chewable tablet  bisacodyl (DULCOLAX) 5 MG EC tablet  blood glucose (NO BRAND SPECIFIED) test strip  blood glucose monitoring (ACCU-CHEK FELIPE SMARTVIEW) meter device kit  blood glucose monitoring (SOFTCLIX) lancets  calcium carbonate-vitamin D (CALTRATE) 600-10 MG-MCG per tablet  Continuous Glucose Sensor (DEXCOM G7 SENSOR) MISC  empagliflozin (JARDIANCE) 25 MG TABS tablet  ferrous sulfate (FEROSUL) 325 (65 Fe) MG tablet  furosemide (LASIX) 20 MG tablet  HUMALOG KWIKPEN 100 UNIT/ML soln  Injection Device for insulin (CEQUR SIMPLICITY 2U) KALI  Injection Device for Insulin (CEQUR SIMPLICITY  ) MISC  insulin degludec (TRESIBA) 200 UNIT/ML pen  insulin pen needle (32G X 4 MM) 32G X 4 MM miscellaneous  ketorolac (ACULAR) 0.5 % ophthalmic solution  latanoprost (XALATAN) 0.005 % ophthalmic solution  lisinopril (ZESTRIL) 5 MG tablet  loperamide (IMODIUM A-D) 2 MG tablet  magnesium oxide 400 MG tablet  mycophenolate (GENERIC EQUIVALENT) 500 MG tablet  omega-3 acid ethyl esters (LOVAZA) 1 g capsule  polyethylene glycol (GOLYTELY) 236 g suspension  rosuvastatin (CRESTOR) 20 MG tablet  senna-docusate (SENOKOT-S/PERICOLACE) 8.6-50 MG tablet  sirolimus (GENERIC EQUIVALENT) 0.5 MG tablet  sulfamethoxazole-trimethoprim (BACTRIM) 400-80 MG tablet  tamsulosin (FLOMAX) 0.4 MG capsule      Allergies   Allergen Reactions    Heparin Heparin Induced Thrombocytopenia     HIT screen sent 7/18/2020. CORRINE confirmed positive     Family History  Family History   Problem Relation Age of Onset    Diabetes Sister     Diabetes Sister     Diabetes Brother     Macular Degeneration No family hx of     Glaucoma No family hx of     Myocardial Infarction No family hx of     Kidney Disease No family hx of     Anesthesia Reaction No family hx of     Bleeding Disorder No family hx of     Venous thrombosis No family hx of      Social History   Social History     Tobacco Use    Smoking status: Never     Passive exposure: Never    Smokeless tobacco: Never    Tobacco comments:     Never smoked; non-smoking household   Vaping Use    Vaping status: Never Used   Substance Use Topics    Alcohol use: No     Alcohol/week: 0.0 standard drinks of alcohol    Drug use: No         A medically appropriate review of systems was performed with pertinent positives and negatives noted in the HPI, and all other systems negative.      Physical Exam   BP: 109/68  Pulse: 106  Temp: 97.7  F (36.5  C)  Resp: 18  SpO2: 97 %      Physical Exam  Vitals and nursing note reviewed.   Constitutional:       Appearance: Normal appearance. He is ill-appearing. He is not  toxic-appearing or diaphoretic.   HENT:      Head: Normocephalic and atraumatic.      Right Ear: External ear normal.      Left Ear: External ear normal.      Nose: Nose normal. No congestion.      Mouth/Throat:      Mouth: Mucous membranes are moist.      Pharynx: Oropharynx is clear. No oropharyngeal exudate.   Eyes:      Extraocular Movements: Extraocular movements intact.      Conjunctiva/sclera: Conjunctivae normal.      Pupils: Pupils are equal, round, and reactive to light.   Cardiovascular:      Rate and Rhythm: Normal rate.      Pulses: Normal pulses.      Heart sounds: Normal heart sounds. No murmur heard.     No friction rub.   Pulmonary:      Effort: Pulmonary effort is normal. No respiratory distress.      Breath sounds: No stridor. No wheezing, rhonchi or rales.   Abdominal:      General: Abdomen is flat. There is no distension.      Tenderness: There is abdominal tenderness in the right lower quadrant and left lower quadrant. There is no guarding or rebound.   Musculoskeletal:         General: No deformity or signs of injury. Normal range of motion.      Cervical back: Normal range of motion.   Skin:     General: Skin is warm.      Capillary Refill: Capillary refill takes less than 2 seconds.      Coloration: Skin is not pale.      Findings: No bruising or erythema.   Neurological:      General: No focal deficit present.      Mental Status: He is alert.      Cranial Nerves: No cranial nerve deficit.      Motor: No weakness.   Psychiatric:         Mood and Affect: Mood normal.         Behavior: Behavior normal.         ED Course        Procedures         Results for orders placed or performed during the hospital encounter of 05/09/25 (from the past 24 hours)   CBC with platelets differential    Narrative    The following orders were created for panel order CBC with platelets differential.  Procedure                               Abnormality         Status                     ---------                                -----------         ------                     CBC with platelets and ...[[id number]]  Abnormal            Final result                 Please view results for these tests on the individual orders.   Comprehensive metabolic panel   Result Value Ref Range    Sodium 128 (L) 135 - 145 mmol/L    Potassium 2.6 (LL) 3.4 - 5.3 mmol/L    Carbon Dioxide (CO2) 13 (L) 22 - 29 mmol/L    Anion Gap 23 (H) 7 - 15 mmol/L    Urea Nitrogen 93.8 (H) 8.0 - 23.0 mg/dL    Creatinine 4.88 (H) 0.67 - 1.17 mg/dL    GFR Estimate 12 (L) >60 mL/min/1.73m2    Calcium 8.6 (L) 8.8 - 10.4 mg/dL    Chloride 92 (L) 98 - 107 mmol/L    Glucose 194 (H) 70 - 99 mg/dL    Alkaline Phosphatase 174 (H) 40 - 150 U/L    AST 13 0 - 45 U/L    ALT 9 0 - 70 U/L    Protein Total 7.7 6.4 - 8.3 g/dL    Albumin 4.6 3.5 - 5.2 g/dL    Bilirubin Total 0.3 <=1.2 mg/dL   Magnesium   Result Value Ref Range    Magnesium 1.7 1.7 - 2.3 mg/dL   Troponin T, High Sensitivity   Result Value Ref Range    Troponin T, High Sensitivity 60 (H) <=22 ng/L   NT-proBNP   Result Value Ref Range    NT-proBNP 1,495 (H) 0 - 229 pg/mL   [place] Draw    Narrative    The following orders were created for panel order [place] Draw.  Procedure                               Abnormality         Status                     ---------                               -----------         ------                     Extra Blue Top Tube[[id number]]                             In process                 Extra Red Top Tube[[id number]]                              In process                   Please view results for these tests on the individual orders.   CBC with platelets and differential   Result Value Ref Range    WBC Count 11.5 (H) 4.0 - 11.0 10e3/uL    RBC Count 4.10 (L) 4.40 - 5.90 10e6/uL    Hemoglobin 11.0 (L) 13.3 - 17.7 g/dL    Hematocrit 32.9 (L) 40.0 - 53.0 %    MCV 80 78 - 100 fL    MCH 26.8 26.5 - 33.0 pg    MCHC 33.4 31.5 - 36.5 g/dL    RDW 16.0 (H) 10.0 - 15.0 %    Platelet Count 323  150 - 450 10e3/uL    % Neutrophils 85 %    % Lymphocytes 8 %    % Monocytes 6 %    % Eosinophils 0 %    % Basophils 0 %    % Immature Granulocytes 1 %    NRBCs per 100 WBC 0 <1 /100    Absolute Neutrophils 9.8 (H) 1.6 - 8.3 10e3/uL    Absolute Lymphocytes 1.0 0.8 - 5.3 10e3/uL    Absolute Monocytes 0.6 0.0 - 1.3 10e3/uL    Absolute Eosinophils 0.0 0.0 - 0.7 10e3/uL    Absolute Basophils 0.0 0.0 - 0.2 10e3/uL    Absolute Immature Granulocytes 0.1 <=0.4 10e3/uL    Absolute NRBCs 0.0 10e3/uL   CT Abdomen Pelvis w/o Contrast    Narrative    EXAMINATION: CT ABDOMEN PELVIS W/O CONTRAST, 5/9/2025 4:43 PM    INDICATION: Abd pain, weakness.  GFR < 30    COMPARISON STUDY: 10/11/2023, 7/9/2019    TECHNIQUE: CT scan of the abdomen and pelvis was performed on  multidetector CT scanner using volumetric acquisition technique and  images were reconstructed in multiple planes with variable thickness  and reviewed on dedicated workstations.     CONTRAST: None injected IV without oral contrast    CT scan radiation dose is optimized to minimum requisite dose using  automated dose modulation techniques.    FINDINGS:    Lower thorax: Small bilateral calcified granulomas.    Liver: No mass. No intrahepatic biliary ductal dilation.    Biliary System: Cholelithiasis without cholecystitis.    Pancreas: No mass or pancreatic ductal dilation.    Adrenal glands: No mass or nodules    Spleen: Normal.    Kidneys: No suspicious mass, obstructing calculus or hydronephrosis.    Gastrointestinal tract: Normal appendix. Normal caliber small bowel.   Numerous duodenal and jejunal diverticula without inflammation.    Mesentery/peritoneum/retroperitoneum: No mass. No free fluid or air.    Lymph nodes: No significant lymphadenopathy.    Pelvis: Urinary bladder is normal.    Osseous structures: No aggressive or acute osseous lesion.  In the  proximal right femur (3/65) there is a benign-appearing osseous lesion  which is unchanged since 7/19/2019 and is  not likely to be clinically  significant.      Soft tissues: Within normal limits.  Bilateral chronic mastoid.      Impression    IMPRESSION:   1. Fluid-filled colon which is nonspecific but can be seen with  enterocolitis. No additional acute findings in the abdomen or pelvis.  Normal appendix.  2. Cholelithiasis without evidence of cholecystitis.  3. Duodenal and jejunal diverticula without evidence of  diverticulitis.    I have personally reviewed the examination and initial interpretation  and I agree with the findings.    NIKOLE DAVILA MD         SYSTEM ID:  [id number]   EKG 12-lead, tracing only   Result Value Ref Range    Systolic Blood Pressure  mmHg    Diastolic Blood Pressure  mmHg    Ventricular Rate 96 BPM    Atrial Rate 96 BPM    DE Interval 128 ms    QRS Duration 112 ms     ms    QTc 442 ms    P Axis 31 degrees    R AXIS -3 degrees    T Axis 39 degrees    Interpretation ECG       Sinus rhythm  Incomplete right bundle branch block  Nonspecific ST and T wave abnormality  Abnormal ECG       *Note: Due to a large number of results and/or encounters for the requested time period, some results have not been displayed. A complete set of results can be found in Results Review.     Medications   potassium chloride 10 mEq in 100 mL sterile water infusion (10 mEq Intravenous $New Bag 5/9/25 8559)             Critical care was not performed.     Medical Decision Making  The patient's presentation was of moderate complexity (an acute complicated injury).    The patient's evaluation involved:  review of external note(s) from 3+ sources (see separate area of note for details)  strong consideration of a test (see separate area of note for details) that was ultimately deferred  ordering and/or review of 3+ test(s) in this encounter (see separate area of note for details)    The patient's management necessitated high risk (a decision regarding hospitalization).    Assessments & Plan (with Medical Decision  Making)     Lucian Oliveira is a 69 year old male who has a history significant for CAD status post orthotopic heart transplantation in 2020, diabetes, chronic kidney disease.  He arrives today to the emergency department evaluation of ongoing diarrhea and abdominal pain.  Upon arrival patient noted alert.  Is presently afebrile and hemodynamically stable with mild tachycardia.  He is lying supine in bed and appears to be slightly uncomfortable is nontoxic.  GCS 15.  Is no evidence of acute neurovascular deficit.  No obvious cardiopulmonary involvement.  Breath sounds clear with SpO2 in the upper 90s on room air.  He does have some abdominal pain with deep palpation of right lower quadrant with minimal guarding on the right.  The patient is now had diarrhea x 8 days.  He has chronic kidney disease I would plan to assess creatinine for possible dehydration or DAYA on top of CKD.  Differential for diarrhea broad I do not see medications contributing to this this may be viral versus other bacterial he would likely benefit from stool culture beyond C. difficile with recent negative testing.  Certainly considered colitis, diverticulitis or less likely be appendicitis but would benefit from CT scan.  This will be limited secondary to inability to give contrast.    CT scan does show multiple loops of bowel with fluid however no sign of active infection or external fluid collection.  Laboratory studies do demonstrate a modest hypokalemia I would plan to replete intravenously.  Also noted to have a fairly robust acute kidney injury.  With ongoing GI loss I would plan for broadened stool cultures and acute hospitalization for fluid replacement and electrolyte monitoring.    I have reviewed the nursing notes.    I have reviewed the findings, diagnosis, plan and need for follow up with the patient.    New Prescriptions    No medications on file       Final diagnoses:   Dehydration   Diarrhea, unspecified type   Hypokalemia    Acute kidney injury       GAUDENCIO CAMPBELL MD    5/9/2025   [facility] EMERGENCY DEPARTMENT     Gaudencio Campbell MD  05/09/25 [id number]

## 2025-05-09 NOTE — H&P
[facility]    History and Physical - Hospitalist Service, GOLD TEAM        Date of Admission:  5/9/2025    Assessment & Plan   Lucian Oliveira is a 69 year old male admitted on 5/9/2025. He has a past medical history of coronary artery disease status post orthotopic heart transplant (2020) type 2 diabetes, chronic kidney disease who presented to [facility]'s [place] ER today with 8 days of diarrhea, intermittent vomiting, poor oral intake and mild abdominal pain and was found to have acute kidney injury with Cr of 4.8, hypokalemia at 2.6, hyponatremia at 128 with a mild leukocytosis of 11 along with an elevated troponin at 60. ECG without ischemia. CT abdomen with mild enterocolitis. He is being admitted to Internal Medicine for further work up and care of an acute diarrheal illness causing dehydration, electrolyte abnormalities and mild heart strain.    # Acute diarrheal illness with associated leukocytosis  # Acute on chronic kidney injury secondary to diarrheal illness  # Hyponatremia  -CT abdomen and pelvis revealed a fluid filled colon, possible enterocolitis. No additional acute findings.   -Enteric stool panel sent and pending  -Zofran and Compazine prn for nausea  -Na is 128 in the setting of considerable dehydration. Will plan to give 1L of LR bolus over 4hr followed by maintenance fluids overnight  -Cr is 4.88 (baseline 2.2-2.5). Will check UA, urine lytes and hydrate overnight. If Cr does not start to improve by tomorrow morning, low threshold to obtain kidney ultrasound. Nephrology consult for further recommendations  -Tylenol 1gm three times daily prn and Ultram 25mg prn for pain    # Hypokalemia secondary to diarrheal illness  -Potassium is 2.6. ECG wtihout any changes currently. He denies any cardiac symptoms currently  -Plan to give 1L LR over 4 hr (20meq of K) + 40meq of IV potassium chloride over 6 hours - plan to recheck sodium around 10pm and  continue to correct as indicated    # Elevated troponin  # CAD s/p orthotopic heart transplant  # Chronic immunosuppression  BNP is elevated at 1495 (previously 2-6k).  Troponin is 60. He denies any cardiac symptoms and ECG is without any ischemia. He just had a stress echo in July 2024 that did not display any reproducible ischemia.    -Plan to trend troponins and monitor for any changes  -Continue Mycophenolate 1500mg twice daily   -Continue Sirolimus 3mg once daily   -Continue Crestor 20mg once daily   -Continue Bactrim 1T T/Th/Sa for PJP prophylaxis  -Continue home Lisinopril 5mg once daily   -He is on Avastin 1.25mg intravitreal once a month  -HOLD home Lasix  -Cardiac transplant consult to manage immunosuppressant medications    # Type II Diabetes  -HOLD home Jardiance and Humolog 10U with breakfast, Tresiba 60U once daily at breakfast --> plan to restart these tomorrow if he able to keep down food this evening  -Continue sliding scale insulin Aspart QID    # BPH  -Continue home regimen of Flomax 0.4mg once daily    # Normocytic anemia  -Hemoglobin is 11.0g/dL and stable. Continue to monitor and transfuse to maintain Hgb of 8 or greater.          Diet: Combination Diet Regular Diet Adult    DVT Prophylaxis: Pneumatic Compression Devices  Calderon Catheter: Not present  Lines: None     Cardiac Monitoring: None  Code Status: Full Code      Disposition Plan     Medically Ready for Discharge: Anticipated in 2-4 Days    AL Hernandez  Hospitalist Service, [facility]  Securely message with [employer] (more info)  Text page via [facility] Paging/Directory   See signed in provider for up to date coverage information    ______________________________________________________________________    Chief Complaint   Diarrhea, abdominal pain    History of Present Illness   Lucian Oliveira is a 69 year old male admitted on 5/9/2025. He has a past medical history of  coronary artery disease status post orthotopic heart transplant (2020) type 2 diabetes, chronic kidney disease who presented to [facility]'s [place] ER today with 8 days of diarrhea, intermittent vomiting, poor oral intake and mild abdominal pain. He reports that for the past 8 days he has had loose watery stool, intermittent vomiting, poor oral intake that has led to feeling very weak.  He denies any fevers, chills, headaches, pain elsewhere in his body, recent sick contacts, recent travel. He denies any blood in his stool. Upon arrival to the ER, vital signs upon arrival include temp 97.7  F, heart rate 102 and regular, /68 and is 97% on room air.  Lab work revealed a sodium of 128, potassium 2.6, creatinine 4.88 calcium 8.6 anion gap of 23 glucose of 194, proBNP of 1495, troponin of 60, white blood cell count 11.5, hemoglobin 11.0.  CT abdomen / pelvis revealed a fluid filled colon, possible enterocolitis but no other acute findings. He was started on 10Meq of K in the ER and will be admitted to Internal Medicine for further care.       Past Medical History    Past Medical History:   Diagnosis Date    CAD (coronary artery disease)     CHF (congestive heart failure) (H)     CKD (chronic kidney disease), stage III (H)     Cortical cataract of both eyes     Diabetes (H)     E. coli colitis 10/12/2023    Hyperlipidemia     Hypertension     Infection due to Streptococcus mitis group 09/23/2020    Ischemic cardiomyopathy     Norovirus 10/12/2023    Obesity     CHANTEL (obstructive sleep apnea)     occas cpap    CHANTEL (obstructive sleep apnea)- severe (AHI 30)     Osteoarthritis        Past Surgical History   Past Surgical History:   Procedure Laterality Date    CATARACT IOL, RT/LT      COLONOSCOPY N/A 8/7/2019    Procedure: COLONOSCOPY, WITH POLYPECTOMY AND BIOPSY;  Surgeon: Chauncey Morataya MD;  Location:  GI    COLONOSCOPY N/A 5/12/2023    Procedure: Colonoscopy;  Surgeon: Mariano Velasquez MD;   [facility]    COLONOSCOPY N/A 10/8/2024    Procedure: Colonoscopy;  Surgeon: Rip Bryan MD;  [facility]    CV ANGIOGRAM CORONARY GRAFT N/A 7/2/2020    Procedure: Angiogram Coronary Graft;  Surgeon: Alex Lantigua MD;  [facility]    CV CORONARY ANGIOGRAM N/A 7/2/2020    Procedure: CV CORONARY ANGIOGRAM;  Surgeon: Alex Lantigua MD;  [facility]    CV CORONARY ANGIOGRAM N/A 7/20/2021    Procedure: CV CORONARY ANGIOGRAM;  Surgeon: Raimundo Hudson MD;  [facility]    CV CORONARY ANGIOGRAM N/A 9/12/2022    Procedure: Coronary Angiogram- BIPLANE;  Surgeon: Raimundo Hudson MD;  Location[facility] C[facility]    CV CORONARY ANGIOGRAM N/A 7/17/2023    Procedure: Coronary Angiogram;  Surgeon: Alex Lantigua MD;  [facility]    CV HEART BIOPSY N/A 7/27/2020    Procedure: Heart Biopsy;  Surgeon: Mario Burr MD;  Location[facility]    CV HEART BIOPSY N/A 8/3/2020    Procedure: CV HEART BIOPSY;  Surgeon: Alex Lantigua MD;  Location:  HEART CARDIAC CATH LAB    CV HEART BIOPSY N/A 8/10/2020    Procedure: CV HEART BIOPSY;  Surgeon: Mario Burr MD;  Location[facility] C[facility]    CV HEART BIOPSY N/A 8/17/2020    Procedure: CV HEART BIOPSY;  Surgeon: Raimundo Hudson MD;  Location: [facility] C[facility]    CV HEART BIOPSY N/A 8/31/2020    Procedure: CV HEART BIOPSY;  Surgeon: Moises Santos MD;  [facility]    CV HEART BIOPSY N/A 9/14/2020    Procedure: CV HEART BIOPSY;  Surgeon: Raimundo Hudson MD;  [facility]    CV HEART BIOPSY N/A 9/28/2020    Procedure: CV HEART BIOPSY;  Surgeon: Raimundo Hudson MD;  Location:  HEART CARDIAC CATH LAB    CV HEART BIOPSY N/A 10/12/2020    Procedure: CV HEART BIOPSY;  Surgeon:  John Stuart MD;  Location: [facility] C[facility]    CV HEART BIOPSY N/A 11/10/2020    Procedure: CV HEART BIOPSY;  Surgeon: John Stuart MD;  Location[facility]    CV HEART BIOPSY N/A 12/7/2020    Procedure: CV HEART BIOPSY;  Surgeon: Raimundo Hudson MD;  Location:  HEART CARDIAC CATH LAB    CV HEART BIOPSY N/A 1/5/2021    Procedure: CV HEART BIOPSY;  Surgeon: Raimundo Hudson MD;  [facility]    CV HEART BIOPSY N/A 7/20/2021    Procedure: CV HEART BIOPSY;  Surgeon: Raimundo Hudson MD;  Location:  HEART CARDIAC CATH LAB    CV HEART BIOPSY N/A 9/28/2021    Procedure: CV HEART BIOPSY;  Surgeon: Raimundo Hudson MD;  Location:  HEART CARDIAC CATH LAB    CV HEART BIOPSY N/A 9/12/2022    Procedure: Heart Biopsy;  Surgeon: Raimundo Hudson MD;  Location:  HEART CARDIAC CATH LAB    CV HEART BIOPSY N/A 7/17/2023    Procedure: Heart Biopsy;  Surgeon: Alex Lantigua MD;  Location:  HEART CARDIAC CATH LAB    CV INTRA AORTIC BALLOON N/A 7/14/2020    Procedure: RHC WITH LEAVE IN SWAN/IABP;  Surgeon: Sonny Saleh MD;  Location:  HEART CARDIAC CATH LAB    CV INTRAVASULAR ULTRASOUND N/A 9/12/2022    Procedure: Intravascular Ultrasound;  Surgeon: Raimundo Hudson MD;  Location:  HEART CARDIAC CATH LAB    CV RIGHT HEART CATH MEASUREMENTS RECORDED N/A 3/25/2019    Procedure: CV RIGHT HEART CATH;  Surgeon: Moises Santos MD;  Location:  HEART CARDIAC CATH LAB    CV RIGHT HEART CATH MEASUREMENTS RECORDED N/A 7/10/2019    Procedure: CV RIGHT HEART CATH;  Surgeon: Jak Mccabe MD;  Location:  HEART CARDIAC CATH LAB    CV RIGHT HEART CATH MEASUREMENTS RECORDED N/A 7/8/2020    Procedure: Right Heart Cath with Leave In swan already has ICU load;  Surgeon: aJk Mccabe MD;  [facility]    CV RIGHT HEART CATH MEASUREMENTS RECORDED N/A  7/14/2020    Procedure: CV RIGHT HEART CATH;  Surgeon: Sonny Saleh MD;  Location:  HEART CARDIAC CATH LAB    CV RIGHT HEART CATH MEASUREMENTS RECORDED N/A 7/2/2020    Procedure: CV RIGHT HEART CATH;  Surgeon: Alex Lantigua MD;  Location:  HEART CARDIAC CATH LAB    CV RIGHT HEART CATH MEASUREMENTS RECORDED N/A 7/27/2020    Procedure: Right Heart Cath;  Surgeon: Mario Burr MD;  Location:  HEART CARDIAC CATH LAB    CV RIGHT HEART CATH MEASUREMENTS RECORDED N/A 8/3/2020    Procedure: Right Heart Cath;  Surgeon: Alex Lantigua MD;  Location:  HEART CARDIAC CATH LAB    CV RIGHT HEART CATH MEASUREMENTS RECORDED N/A 8/10/2020    Procedure: CV RIGHT HEART CATH;  Surgeon: Mario Burr MD;  Location:  HEART CARDIAC CATH LAB    CV RIGHT HEART CATH MEASUREMENTS RECORDED N/A 8/17/2020    Procedure: CV RIGHT HEART CATH;  Surgeon: Raimundo Hudson MD;  Location:  HEART CARDIAC CATH LAB    CV RIGHT HEART CATH MEASUREMENTS RECORDED N/A 8/31/2020    Procedure: CV RIGHT HEART CATH;  Surgeon: Moises Santos MD;  Location:  HEART CARDIAC CATH LAB    CV RIGHT HEART CATH MEASUREMENTS RECORDED N/A 9/14/2020    Procedure: CV RIGHT HEART CATH;  Surgeon: Raimundo Hudson MD;  Location:  HEART CARDIAC CATH LAB    CV RIGHT HEART CATH MEASUREMENTS RECORDED N/A 9/28/2020    Procedure: CV RIGHT HEART CATH;  Surgeon: Raimundo Hudson MD;  Location:  HEART CARDIAC CATH LAB    CV RIGHT HEART CATH MEASUREMENTS RECORDED N/A 10/12/2020    Procedure: CV RIGHT HEART CATH;  Surgeon: John Stuart MD;  Location:  HEART CARDIAC CATH LAB    CV RIGHT HEART CATH MEASUREMENTS RECORDED N/A 11/10/2020    Procedure: CV RIGHT HEART CATH;  Surgeon: John Stuart MD;  Location:  HEART CARDIAC CATH LAB    CV RIGHT HEART CATH MEASUREMENTS RECORDED N/A 12/7/2020    Procedure: CV RIGHT HEART CATH;  Surgeon: Raimundo Hudson MD;  [facility]: [facility]  CARDIAC CATH LAB    CV RIGHT HEART CATH MEASUREMENTS RECORDED N/A 1/5/2021    Procedure: CV RIGHT HEART CATH;  Surgeon: Raimundo Hudson MD;  Location:  HEART CARDIAC CATH LAB    CV RIGHT HEART CATH MEASUREMENTS RECORDED N/A 7/20/2021    Procedure: CV RIGHT HEART CATH;  Surgeon: Raimundo Hudson MD;  Location:  HEART CARDIAC CATH LAB    CV RIGHT HEART CATH MEASUREMENTS RECORDED N/A 9/28/2021    Procedure: CV RIGHT HEART CATH;  Surgeon: Raimundo Hudson MD;  Location:  HEART CARDIAC CATH LAB    CV RIGHT HEART CATH MEASUREMENTS RECORDED N/A 9/12/2022    Procedure: Right Heart Catheterization;  Surgeon: Raimundo Hudson MD;  Location:  HEART CARDIAC CATH LAB    CV RIGHT HEART CATH MEASUREMENTS RECORDED N/A 7/17/2023    Procedure: Right Heart Catheterization;  Surgeon: Alex Lantigua MD;  Location:  HEART CARDIAC CATH LAB    ESOPHAGOSCOPY, GASTROSCOPY, DUODENOSCOPY (EGD), COMBINED N/A 10/8/2024    Procedure: ESOPHAGOGASTRODUODENOSCOPY, WITH BIOPSY;  Surgeon: Rip Bryan MD;  [facility]    EXTRACTION(S) DENTAL Left 7/13/2020    Procedure: EXTRACTION, TOOTH #11, 12, 13, 15, and 29;  Surgeon: Monica Chao DDS;  [facility]    IMPLANT AUTOMATIC IMPLANTABLE CARDIOVERTER DEFIBRILLATOR      INCISION AND DRAINAGE STERNUM W/ IRRIGATION SYSTEM, COMBINED N/A 9/23/2020    Procedure: INCISION AND DRAINAGE, LEFT SUPRACLAVICULAR WOUND INFECTION AND ABDOMENN WOUND.;  Surgeon: Ran Huertas MD;  [facility]    INSERT INTRAAORTIC BALLOON PUMP N/A 7/16/2020    Procedure: Subclavian Intra-Aortic Balloon Pump Placement;  Surgeon: Allan Sparrow MD;  [facility]    IRRIGATION AND DEBRIDEMENT CHEST WASHOUT, COMBINED N/A 9/28/2020    Procedure: IRRIGATION AND DEBRIDEMENT OF LEFT UPPER CHEST WOUND.;  Surgeon: Ran Huertas MD;  [facility]    PHACOEMULSIFICATION CLEAR CORNEA WITH STANDARD INTRAOCULAR LENS IMPLANT Right 2/6/2023     Procedure: RIGHT EYE PHACOEMULSIFICATION, CATARACT, WITH INTRAOCULAR LENS IMPLANT with trypan blue;  Surgeon: Triny Bunch MD;  [facility]    PHACOEMULSIFICATION CLEAR CORNEA WITH STANDARD IOL, VITRECTOMY PARSPLANA 25 GAGUE, COMBINED Left 12/10/2019    Procedure: PHACOEMULSIFICATION, CATARACT, CLEAR CORNEAL INCISION APPROACH, W STD INTRAOCULAR LENS IMPLANT INSERT + VITRECTOMY BY PARS PLANA  USING 25-GAUGE INSTRUMENTS. ENDOLASER, INFUSION OF 20% SF6 GAS;  Surgeon: Triny Bunch MD;  [facility]    PICC DOUBLE LUMEN PLACEMENT Left 2020    5Fr - 42cm, Basilic vein, mid SVC    PICC INSERTION Right 2020    basilic 44 cm total     TRANSPLANT HEART RECIPIENT N/A 2020    Procedure: REDO MEDIAN STERNOTOMY, TRANSPLANT, ORTHOTOPIC HEART, RECIPIENT, ON PUMP OXYGENATOR, REMOVAL OF CARDIAC DEFIBRILLATOR AND LEAD;  Surgeon: Griselli, Massimo, MD;  [facility]    VITRECTOMY, PARS PLANA APPROACH, USING 27-GAUGE INSTRUMENTS Left 2020    Procedure: 27 gauge pars plana vitectomy, membrane peel, perfluoroctane liquid (PFO), retinectomy, endolaser, Silicone Oil 1000 cs;  Surgeon: Triny Bunch MD;  [facility]: [facility]    C CABG, ARTERY-VEIN, THREE  2008       Prior to Admission Medications   Prior to Admission Medications   Prescriptions Last Dose Informant Patient Reported? Taking?   Alcohol Swabs PADS   No No   Sig: Use to swab the area of the injection or sergio as directed   Per insurance coverage   Continuous Glucose Sensor (DEXCOM G7 SENSOR) MISC   No No   Sig: CAMBIE CADA 10 MCINTYRE   HUMALOG KWIKPEN 100 UNIT/ML soln   No No   Sig: Give 10 units with breakfast, 10 units with lunch,  and 5 units with dinner.  Average daily use is 25 units   Injection Device for Insulin (CEQUR SIMPLICITY ) MISC   No No   Si each See Admin Instructions. Use with patches. Change patch every 2-3 days   Injection Device for insulin (CEQUR SIMPLICITY 2U) KALI   No No   Si  each See Admin Instructions. Change patch every 2-3 days   acetaminophen (TYLENOL) 325 MG tablet   No No   Sig: Take 3 tablets (975 mg) by mouth every 8 hours as needed for mild pain   aspirin (ASA) 81 MG chewable tablet   No No   Sig: Take 1 tablet (81 mg) by mouth daily   bisacodyl (DULCOLAX) 5 MG EC tablet   No No   Sig: Two days prior to exam take two (2) tablets at 4pm. One day prior to exam take two (2) tablets at 4pm   Patient not taking: Reported on 2025   blood glucose (NO BRAND SPECIFIED) test strip   No No   Sig: Use to test blood sugar 4 times daily or as directed.   Patient not taking: Reported on 2025   blood glucose monitoring (ACCU-CHEK FELIPE SMARTVIEW) meter device kit  Self No No   Sig: Use to test blood sugar 3-4 times daily, as directed.   Patient not taking: Reported on 2025   blood glucose monitoring (SOFTCLIX) lancets   No No   Si each 4 times daily Use to test blood sugars 2 times daily.   Patient not taking: Reported on 2025   calcium carbonate-vitamin D (CALTRATE) 600-10 MG-MCG per tablet   No No   Sig: TAKE ONE TABLET BY MOUTH TWICE A DAY WITH MEALS   empagliflozin (JARDIANCE) 25 MG TABS tablet   No No   Sig: Take 1 tablet (25 mg) by mouth daily.   ferrous sulfate (FEROSUL) 325 (65 Fe) MG tablet   No No   Sig: Take 1 tablet (325 mg) by mouth 2 times daily (with meals)   furosemide (LASIX) 20 MG tablet   No No   Sig: Take 1 tablet (20 mg) by mouth daily.   insulin degludec (TRESIBA) 200 UNIT/ML pen   No No   Sig: Inject 60 Units Subcutaneous every morning Order pen needles too   insulin pen needle (32G X 4 MM) 32G X 4 MM miscellaneous   No No   Sig: Use 5-6 pen needles daily or as directed.   ketorolac (ACULAR) 0.5 % ophthalmic solution   No No   Sig: Place 1 drop into the right eye 2 times daily   latanoprost (XALATAN) 0.005 % ophthalmic solution   No No   Sig: Place 1 drop into both eyes daily.   lisinopril (ZESTRIL) 5 MG tablet   No No   Sig: Take 1 tablet (5 mg) by  mouth daily.   loperamide (IMODIUM A-D) 2 MG tablet   No No   Sig: Take 1 tablet (2 mg) by mouth 4 times daily as needed for diarrhea.   magnesium oxide 400 MG tablet   No No   Sig: Take 1 tablet (400 mg) by mouth 2 times daily   mycophenolate (GENERIC EQUIVALENT) 500 MG tablet   No No   Sig: Take 3 tablets (1,500 mg) by mouth 2 times daily   omega-3 acid ethyl esters (LOVAZA) 1 g capsule   No No   Sig: TOME 1 CAPSULA POR LA BOCA CADA LISA   polyethylene glycol (GOLYTELY) 236 g suspension   No No   Sig: Two days before procedure at 5PM fill first container with water. Mix and drink an 8 oz glass every 15 minutes until HALF of the container is gone. Place the remainder in the refrigerator. One day before procedure at 5PM drink second half of bowel prep. Drink an 8 oz glass every 15 minutes until it is gone. Day of procedure 6 hours before arrival time fill the 2nd container with water. Mix and drink an 8 oz glass every 15 minutes until HALF of the container is gone. Discard the remaining solution.   Patient not taking: Reported on 5/9/2025   rosuvastatin (CRESTOR) 20 MG tablet   No No   Sig: Take 1 tablet (20 mg) by mouth daily.   senna-docusate (SENOKOT-S/PERICOLACE) 8.6-50 MG tablet   No No   Sig: Take 1 tablet by mouth 2 times daily as needed (hold for loose stools)   Patient not taking: Reported on 5/9/2025   sirolimus (GENERIC EQUIVALENT) 0.5 MG tablet   No No   Sig: Take 6 tablets (3 mg) by mouth daily   sulfamethoxazole-trimethoprim (BACTRIM) 400-80 MG tablet   No No   Sig: Take 1 tablet by mouth three times a week.   tamsulosin (FLOMAX) 0.4 MG capsule   No No   Sig: Take 1 capsule (0.4 mg) by mouth every morning.      Facility-Administered Medications Last Administration Doses Remaining   bevacizumab (AVASTIN) intravitreal inj 1.25 mg 5/7/2025 12:26 PM            Review of Systems    The 10 point Review of Systems is negative other than noted in the HPI or here.     Social History   I have reviewed this  patient's social history and updated it with pertinent information if needed.  Social History     Tobacco Use    Smoking status: Never     Passive exposure: Never    Smokeless tobacco: Never    Tobacco comments:     Never smoked; non-smoking household   Vaping Use    Vaping status: Never Used   Substance Use Topics    Alcohol use: No     Alcohol/week: 0.0 standard drinks of alcohol    Drug use: No         Family History   I have reviewed this patient's family history and updated it with pertinent information if needed.  Family History   Problem Relation Age of Onset    Diabetes Sister     Diabetes Sister     Diabetes Brother     Macular Degeneration No family hx of     Glaucoma No family hx of     Myocardial Infarction No family hx of     Kidney Disease No family hx of     Anesthesia Reaction No family hx of     Bleeding Disorder No family hx of     Venous thrombosis No family hx of          Allergies   Allergies   Allergen Reactions    Heparin Heparin Induced Thrombocytopenia     HIT screen sent 7/18/2020. CORRINE confirmed positive        Physical Exam   Vital Signs: Temp: 98  F (36.7  C) Temp src: Oral BP: 90/56 Pulse: 95   Resp: 18 SpO2: 100 % O2 Device: None (Room air)    Weight: 0 lbs 0 oz    General Appearance: 69 year old gentleman resting in bed, very fatigued, reports mild abdominal pain  Respiratory: breathing comfortably on room air, no adventitious sounds to bilateral auscultation  Cardiovascular: regular rate and rhythm, no appreciable murmurs, rubs or gallops  GI: mildly distended, minimal tenderness to general palpation throughout abdomen, no rebound or guarding  Skin: warm, sweaty, no open sores, lesions or ulcerations  Psych: alert, oriented to name, date, hospital and recent    Medical Decision Making       60 MINUTES SPENT BY ME on the date of service doing chart review, history, exam, documentation & further activities per the note.      Data     I have personally reviewed the following data over  the past 24 hrs:    11.5 (H)  \   11.0 (L)   / 323     128 (L) 92 (L) 93.8 (H) /  194 (H)   2.6 (LL) 13 (L) 4.88 (H) \     ALT: 9 AST: 13 AP: 174 (H) TBILI: 0.3   ALB: 4.6 TOT PROTEIN: 7.7 LIPASE: N/A     Trop: 60 (H) BNP: 1,495 (H)

## 2025-05-09 NOTE — ED TRIAGE NOTES
Pt c/o diarrhea x 8 days, abdominal pain and generalized weakness. Pt denies N/V, fevers, CP, SOB. Pt seen on 5/7 and tested negative for c-diff. Pt prescribed antidiarrheals which he states have not been effective. Pt abdomen tender.

## 2025-05-10 LAB
ADV 40+41 DNA STL QL NAA+NON-PROBE: NEGATIVE
ALBUMIN MFR UR ELPH: 35.3 MG/DL
ALBUMIN UR-MCNC: 10 MG/DL
ANION GAP SERPL CALCULATED.3IONS-SCNC: 19 MMOL/L (ref 7–15)
APPEARANCE UR: CLEAR
ASTRO TYP 1-8 RNA STL QL NAA+NON-PROBE: NEGATIVE
ATRIAL RATE - MUSE: 96 BPM
BILIRUB UR QL STRIP: NEGATIVE
BUN SERPL-MCNC: 98.6 MG/DL (ref 8–23)
C CAYETANENSIS DNA STL QL NAA+NON-PROBE: NEGATIVE
CALCIUM SERPL-MCNC: 8.5 MG/DL (ref 8.8–10.4)
CAMPYLOBACTER DNA SPEC NAA+PROBE: NEGATIVE
CHLORIDE SERPL-SCNC: 99 MMOL/L (ref 98–107)
COLOR UR AUTO: ABNORMAL
CREAT SERPL-MCNC: 4.5 MG/DL (ref 0.67–1.17)
CREAT UR-MCNC: 175 MG/DL
CRYPTOSP DNA STL QL NAA+NON-PROBE: NEGATIVE
DIASTOLIC BLOOD PRESSURE - MUSE: NORMAL MMHG
E COLI O157 DNA STL QL NAA+NON-PROBE: NORMAL
E HISTOLYT DNA STL QL NAA+NON-PROBE: NEGATIVE
EAEC ASTA GENE ISLT QL NAA+PROBE: NEGATIVE
EC STX1+STX2 GENES STL QL NAA+NON-PROBE: NEGATIVE
EGFRCR SERPLBLD CKD-EPI 2021: 13 ML/MIN/1.73M2
EPEC EAE GENE STL QL NAA+NON-PROBE: NEGATIVE
ERYTHROCYTE [DISTWIDTH] IN BLOOD BY AUTOMATED COUNT: 15.9 % (ref 10–15)
ETEC LTA+ST1A+ST1B TOX ST NAA+NON-PROBE: NEGATIVE
G LAMBLIA DNA STL QL NAA+NON-PROBE: NEGATIVE
GLUCOSE BLDC GLUCOMTR-MCNC: 113 MG/DL (ref 70–99)
GLUCOSE BLDC GLUCOMTR-MCNC: 163 MG/DL (ref 70–99)
GLUCOSE BLDC GLUCOMTR-MCNC: 180 MG/DL (ref 70–99)
GLUCOSE BLDC GLUCOMTR-MCNC: 217 MG/DL (ref 70–99)
GLUCOSE BLDC GLUCOMTR-MCNC: 222 MG/DL (ref 70–99)
GLUCOSE SERPL-MCNC: 203 MG/DL (ref 70–99)
GLUCOSE UR STRIP-MCNC: 300 MG/DL
HCO3 SERPL-SCNC: 11 MMOL/L (ref 22–29)
HCT VFR BLD AUTO: 29.5 % (ref 40–53)
HGB BLD-MCNC: 10.1 G/DL (ref 13.3–17.7)
HGB UR QL STRIP: NEGATIVE
HYALINE CASTS: 77 /LPF
INTERPRETATION ECG - MUSE: NORMAL
KETONES UR STRIP-MCNC: NEGATIVE MG/DL
LEUKOCYTE ESTERASE UR QL STRIP: NEGATIVE
MCH RBC QN AUTO: 27.1 PG (ref 26.5–33)
MCHC RBC AUTO-ENTMCNC: 34.2 G/DL (ref 31.5–36.5)
MCV RBC AUTO: 79 FL (ref 78–100)
MUCOUS THREADS #/AREA URNS LPF: PRESENT /LPF
NITRATE UR QL: NEGATIVE
NOROVIRUS GI+II RNA STL QL NAA+NON-PROBE: NEGATIVE
P AXIS - MUSE: 31 DEGREES
P SHIGELLOIDES DNA STL QL NAA+NON-PROBE: NEGATIVE
PH UR STRIP: 5 [PH] (ref 5–7)
PLATELET # BLD AUTO: 272 10E3/UL (ref 150–450)
POTASSIUM SERPL-SCNC: 2.8 MMOL/L (ref 3.4–5.3)
POTASSIUM SERPL-SCNC: 3.1 MMOL/L (ref 3.4–5.3)
PR INTERVAL - MUSE: 128 MS
PROT/CREAT 24H UR: 0.2 MG/MG CR (ref 0–0.2)
QRS DURATION - MUSE: 112 MS
QT - MUSE: 350 MS
QTC - MUSE: 442 MS
R AXIS - MUSE: -3 DEGREES
RBC # BLD AUTO: 3.73 10E6/UL (ref 4.4–5.9)
RBC URINE: 1 /HPF
RVA RNA STL QL NAA+NON-PROBE: NEGATIVE
SALMONELLA SP RPOD STL QL NAA+PROBE: NEGATIVE
SAPO I+II+IV+V RNA STL QL NAA+NON-PROBE: NEGATIVE
SHIGELLA SP+EIEC IPAH ST NAA+NON-PROBE: NEGATIVE
SODIUM SERPL-SCNC: 129 MMOL/L (ref 135–145)
SODIUM UR-SCNC: <20 MMOL/L
SP GR UR STRIP: 1.02 (ref 1–1.03)
SQUAMOUS EPITHELIAL: <1 /HPF
SYSTOLIC BLOOD PRESSURE - MUSE: NORMAL MMHG
T AXIS - MUSE: 39 DEGREES
UROBILINOGEN UR STRIP-MCNC: NORMAL MG/DL
UUN UR-MCNC: 408 MG/DL (ref 801–1666)
V CHOLERAE DNA SPEC QL NAA+PROBE: NEGATIVE
VENTRICULAR RATE- MUSE: 96 BPM
VIBRIO DNA SPEC NAA+PROBE: NEGATIVE
WBC # BLD AUTO: 9.2 10E3/UL (ref 4–11)
WBC URINE: 3 /HPF
Y ENTEROCOL DNA STL QL NAA+PROBE: NEGATIVE

## 2025-05-10 PROCEDURE — 250N000012 HC RX MED GY IP 250 OP 636 PS 637: Performed by: PHYSICIAN ASSISTANT

## 2025-05-10 PROCEDURE — 82310 ASSAY OF CALCIUM: CPT | Performed by: PHYSICIAN ASSISTANT

## 2025-05-10 PROCEDURE — 82962 GLUCOSE BLOOD TEST: CPT

## 2025-05-10 PROCEDURE — 36415 COLL VENOUS BLD VENIPUNCTURE: CPT | Performed by: PHYSICIAN ASSISTANT

## 2025-05-10 PROCEDURE — 99223 1ST HOSP IP/OBS HIGH 75: CPT | Mod: GC | Performed by: INTERNAL MEDICINE

## 2025-05-10 PROCEDURE — 250N000012 HC RX MED GY IP 250 OP 636 PS 637

## 2025-05-10 PROCEDURE — 120N000011 HC R&B TRANSPLANT UMMC

## 2025-05-10 PROCEDURE — 84540 ASSAY OF URINE/UREA-N: CPT | Performed by: PHYSICIAN ASSISTANT

## 2025-05-10 PROCEDURE — 99207 PR APP CREDIT; MD BILLING SHARED VISIT: CPT | Mod: FS | Performed by: PHYSICIAN ASSISTANT

## 2025-05-10 PROCEDURE — 99233 SBSQ HOSP IP/OBS HIGH 50: CPT | Mod: FS | Performed by: STUDENT IN AN ORGANIZED HEALTH CARE EDUCATION/TRAINING PROGRAM

## 2025-05-10 PROCEDURE — 99222 1ST HOSP IP/OBS MODERATE 55: CPT | Performed by: INTERNAL MEDICINE

## 2025-05-10 PROCEDURE — 87507 IADNA-DNA/RNA PROBE TQ 12-25: CPT | Performed by: EMERGENCY MEDICINE

## 2025-05-10 PROCEDURE — 84132 ASSAY OF SERUM POTASSIUM: CPT | Performed by: STUDENT IN AN ORGANIZED HEALTH CARE EDUCATION/TRAINING PROGRAM

## 2025-05-10 PROCEDURE — 258N000003 HC RX IP 258 OP 636: Performed by: PHYSICIAN ASSISTANT

## 2025-05-10 PROCEDURE — 250N000013 HC RX MED GY IP 250 OP 250 PS 637: Performed by: TRANSPLANT SURGERY

## 2025-05-10 PROCEDURE — 84156 ASSAY OF PROTEIN URINE: CPT | Performed by: PHYSICIAN ASSISTANT

## 2025-05-10 PROCEDURE — 81003 URINALYSIS AUTO W/O SCOPE: CPT | Performed by: PHYSICIAN ASSISTANT

## 2025-05-10 PROCEDURE — 250N000009 HC RX 250: Performed by: STUDENT IN AN ORGANIZED HEALTH CARE EDUCATION/TRAINING PROGRAM

## 2025-05-10 PROCEDURE — 84300 ASSAY OF URINE SODIUM: CPT | Performed by: PHYSICIAN ASSISTANT

## 2025-05-10 PROCEDURE — 250N000013 HC RX MED GY IP 250 OP 250 PS 637: Performed by: PHYSICIAN ASSISTANT

## 2025-05-10 PROCEDURE — 99222 1ST HOSP IP/OBS MODERATE 55: CPT | Mod: GC | Performed by: INTERNAL MEDICINE

## 2025-05-10 PROCEDURE — 85041 AUTOMATED RBC COUNT: CPT | Performed by: PHYSICIAN ASSISTANT

## 2025-05-10 PROCEDURE — 87799 DETECT AGENT NOS DNA QUANT: CPT

## 2025-05-10 PROCEDURE — 250N000013 HC RX MED GY IP 250 OP 250 PS 637: Performed by: STUDENT IN AN ORGANIZED HEALTH CARE EDUCATION/TRAINING PROGRAM

## 2025-05-10 PROCEDURE — 36415 COLL VENOUS BLD VENIPUNCTURE: CPT

## 2025-05-10 PROCEDURE — 36415 COLL VENOUS BLD VENIPUNCTURE: CPT | Performed by: STUDENT IN AN ORGANIZED HEALTH CARE EDUCATION/TRAINING PROGRAM

## 2025-05-10 RX ORDER — POTASSIUM CHLORIDE 750 MG/1
40 TABLET, EXTENDED RELEASE ORAL ONCE
Status: COMPLETED | OUTPATIENT
Start: 2025-05-10 | End: 2025-05-10

## 2025-05-10 RX ORDER — POTASSIUM CHLORIDE 750 MG/1
20 TABLET, EXTENDED RELEASE ORAL ONCE
Status: COMPLETED | OUTPATIENT
Start: 2025-05-10 | End: 2025-05-10

## 2025-05-10 RX ORDER — MYCOPHENOLATE MOFETIL 250 MG/1
750 CAPSULE ORAL
Status: DISCONTINUED | OUTPATIENT
Start: 2025-05-10 | End: 2025-05-16

## 2025-05-10 RX ORDER — SODIUM CHLORIDE, SODIUM LACTATE, POTASSIUM CHLORIDE, CALCIUM CHLORIDE 600; 310; 30; 20 MG/100ML; MG/100ML; MG/100ML; MG/100ML
INJECTION, SOLUTION INTRAVENOUS CONTINUOUS
Status: DISCONTINUED | OUTPATIENT
Start: 2025-05-10 | End: 2025-05-10

## 2025-05-10 RX ADMIN — SODIUM BICARBONATE: 84 INJECTION, SOLUTION INTRAVENOUS at 18:06

## 2025-05-10 RX ADMIN — MAGNESIUM OXIDE TAB 400 MG (241.3 MG ELEMENTAL MG) 400 MG: 400 (241.3 MG) TAB at 12:38

## 2025-05-10 RX ADMIN — INSULIN ASPART 1 UNITS: 100 INJECTION, SOLUTION INTRAVENOUS; SUBCUTANEOUS at 13:59

## 2025-05-10 RX ADMIN — FERROUS SULFATE TAB 325 MG (65 MG ELEMENTAL FE) 325 MG: 325 (65 FE) TAB at 09:44

## 2025-05-10 RX ADMIN — MYCOPHENOLATE MOFETIL 750 MG: 250 CAPSULE ORAL at 18:06

## 2025-05-10 RX ADMIN — CALCIUM CARBONATE 600 MG (1,500 MG)-VITAMIN D3 400 UNIT TABLET 1 TABLET: at 18:06

## 2025-05-10 RX ADMIN — POTASSIUM CHLORIDE 40 MEQ: 750 TABLET, EXTENDED RELEASE ORAL at 14:22

## 2025-05-10 RX ADMIN — ACETAMINOPHEN 975 MG: 325 TABLET ORAL at 14:21

## 2025-05-10 RX ADMIN — MAGNESIUM OXIDE TAB 400 MG (241.3 MG ELEMENTAL MG) 400 MG: 400 (241.3 MG) TAB at 20:01

## 2025-05-10 RX ADMIN — FERROUS SULFATE TAB 325 MG (65 MG ELEMENTAL FE) 325 MG: 325 (65 FE) TAB at 18:06

## 2025-05-10 RX ADMIN — SIROLIMUS 3 MG: 2 TABLET ORAL at 10:34

## 2025-05-10 RX ADMIN — ACETAMINOPHEN 975 MG: 325 TABLET ORAL at 09:44

## 2025-05-10 RX ADMIN — SODIUM CHLORIDE, SODIUM LACTATE, POTASSIUM CHLORIDE, AND CALCIUM CHLORIDE: .6; .31; .03; .02 INJECTION, SOLUTION INTRAVENOUS at 14:21

## 2025-05-10 RX ADMIN — LISINOPRIL 5 MG: 5 TABLET ORAL at 09:44

## 2025-05-10 RX ADMIN — ROSUVASTATIN CALCIUM 10 MG: 10 TABLET, FILM COATED ORAL at 09:44

## 2025-05-10 RX ADMIN — TAMSULOSIN HYDROCHLORIDE 0.4 MG: 0.4 CAPSULE ORAL at 09:44

## 2025-05-10 RX ADMIN — CALCIUM CARBONATE 600 MG (1,500 MG)-VITAMIN D3 400 UNIT TABLET 1 TABLET: at 09:44

## 2025-05-10 RX ADMIN — MYCOPHENOLATE MOFETIL 1500 MG: 500 TABLET, FILM COATED ORAL at 10:34

## 2025-05-10 RX ADMIN — LATANOPROST 1 DROP: 50 SOLUTION OPHTHALMIC at 20:02

## 2025-05-10 RX ADMIN — INSULIN ASPART 1 UNITS: 100 INJECTION, SOLUTION INTRAVENOUS; SUBCUTANEOUS at 17:43

## 2025-05-10 RX ADMIN — ASPIRIN 81 MG CHEWABLE TABLET 81 MG: 81 TABLET CHEWABLE at 09:44

## 2025-05-10 RX ADMIN — POTASSIUM CHLORIDE 20 MEQ: 750 TABLET, EXTENDED RELEASE ORAL at 20:01

## 2025-05-10 ASSESSMENT — ACTIVITIES OF DAILY LIVING (ADL)
ADLS_ACUITY_SCORE: 66
ADLS_ACUITY_SCORE: 66
ADLS_ACUITY_SCORE: 65
ADLS_ACUITY_SCORE: 58
ADLS_ACUITY_SCORE: 66
ADLS_ACUITY_SCORE: 66
ADLS_ACUITY_SCORE: 58
ADLS_ACUITY_SCORE: 66
ADLS_ACUITY_SCORE: 65
ADLS_ACUITY_SCORE: 65
ADLS_ACUITY_SCORE: 66
ADLS_ACUITY_SCORE: 65
ADLS_ACUITY_SCORE: 58
ADLS_ACUITY_SCORE: 58
ADLS_ACUITY_SCORE: 66
ADLS_ACUITY_SCORE: 65
ADLS_ACUITY_SCORE: 58
ADLS_ACUITY_SCORE: 65

## 2025-05-10 NOTE — CONSULTS
Cardiology Consult Note     Date of Service: 05/10/2025  Patient: Lucian Oliveira  MRN: [id number]  Admission Date: 5/9/2025  Hospital Day: 1    Reason for Consult: Immunosuppressant management for OHT patient in setting of diarrheal illness, hypokalemia and acute kidney injury.    Assessment:   Lucian Oliveira is a 69 year old male with a past medical history significant for CAD s/p CABG in 2008, ICM and s/p orthotopic heart transplant in 7/2020, HTN, HLD, mild pulmonary HTN, CHANTEL, HIT s/p PLEX, CKD, Type II DM, vitreous hemorrhage, Norovirus and EAEC (2024 type 2 diabetes, and CKD stage 3, admitted 5/9/25 p/w diarrhea x 8 days with intermittent vomiting, mild abdominal pain and poor po intake, found to have DAYA, hypokalemia, hyponatremia and mild leukocytosis, with CT abdomen/pelvis showing mild enterocolitis. Cardiology heart failure team consulted for management of immunosuppression.    Cardiology Problem List:  Ischemic Cardiomyopathy s/p OHT 7/19/2025  primary graft dysfunction  with recovered graft function  history of HIT status post Plex  Diabetes Mellitus, Type II  hypertension  hyperlipidemia  retinal detachment status post vitrectomy in December 2020  Pulmonary HTN, WHO class    Diarrhea, acute  DAYA  Hypokalemia  Hyponatremia    Patient reports he has had watery diarrhea for 8 days, 7-8 episodes per day without bleeding. Mild leukocytosis. Enteric pathogen panel negative and c-diff negative. CT A/P showing normal caliber small bowel, duodenal and jejunal diverticula w/o inflammation, cholelithiasis and fluid in colon possibly representing enterocolitis. Etiology of diarrhea currently unclear but likely infectious versus medication-induced. Strategy is to lower mycophenolate dose to half to allow patient to fight whatever infectious agent may be causing diarrhea. Recommend involving transplant ID and checking for CMV, EBV as potential causes given immunosuppressed status.    Serostatus:    Viral serostatus: CMV D+/R+, EBV D+/R+, HSV 1+/2-, VZV +  Other serostatus: Toxoplasma D+/R-, on bactrim  Blood type: O positive     Immunosuppression:  -Calcineurin Inhibitor: Sirolimus 3 mg po daily, goal level 6-8  -Cell cycle Inhibitor: Mycophenolate 1500 mg BID  - No rejection history     Infection Prophylaxis:  Fungal Prophylaxis: none  Bacterial prophylaxis: none  PJP prophylaxis: tmp/smx  IgG:10/12/23 668  Qtc: 442 ms on 5/9/25    Recommendations:   Obtain sirolimus level 1 hour prior to next dose (test ordered for 10:00 AM 5/11/25, next dose 11:00 AM per pharmacy)  Decrease mycophenolate dose from 1500 mg BID to 750 mg BID  Consult transplant infectious disease for assistance with cause of diarrhea  If infectious disease work up negative or unrevealing and patient continues to have symptoms, consider evaluation for mycophenolate-induced colitis.  We will continue to follow.  Rest of care per primary team.    Patient was seen and plan of care was discussed with attending physician Dr. Holder. Final recommendations pending Attending Attestation. Please don't hesitate to contact our team with any additional questions or concerns.    Mariano Patterson MD  Internal Medicine Resident, PGY-1    Subjective   History of Present Illness:    Lucian Oliveira is a 69 year old male with a past medical history significant for CAD s/p CABG in 2008, ICM and s/p orthotopic heart transplant in 7/2020, HTN, HLD, mild pulmonary HTN, CHANTEL, HIT s/p PLEX, CKD, Type II DM, vitreous hemorrhage, Norovirus and EAEC (2024 type 2 diabetes, and CKD stage 3, admitted 5/9/25 p/w diarrhea x 8 days with intermittent vomiting, mild abdominal pain and poor po intake. Patient denies fever, chills, night sweats, recent sick contacts or travel outside of the area. He denies current HA, body aches, nausea/vomiting, melena, hematochezia or hematemesis. He states no one else at home is sick. He reports he is feeling better today compared to  previous days but continues to have diarrhea 7-8 times per day. He denies CP, SOB, dizziness/lightheadedness.    Review of Systems:  A comprehensive ROS was performed and found to be negative or non-contributory with the exception of that noted in the HPI above.    Past Medical History:   Diagnosis Date    CAD (coronary artery disease)     CHF (congestive heart failure) (H)     CKD (chronic kidney disease), stage III (H)     Cortical cataract of both eyes     Diabetes (H)     E. coli colitis 10/12/2023    Hyperlipidemia     Hypertension     Infection due to Streptococcus mitis group 09/23/2020    Ischemic cardiomyopathy     Norovirus 10/12/2023    Obesity     CHANTEL (obstructive sleep apnea)     occas cpap    CHANTEL (obstructive sleep apnea)- severe (AHI 30)     Osteoarthritis      Past Surgical History:   Procedure Laterality Date    CATARACT IOL, RT/LT      COLONOSCOPY N/A 8/7/2019    Procedure: COLONOSCOPY, WITH POLYPECTOMY AND BIOPSY;  Surgeon: Chauncey Morataya MD;  [facility]    COLONOSCOPY N/A 5/12/2023    Procedure: Colonoscopy;  Surgeon: Mariano Velasquez MD;  [facility]    COLONOSCOPY N/A 10/8/2024    Procedure: Colonoscopy;  Surgeon: Rip Bryan MD;  [facility]    CV ANGIOGRAM CORONARY GRAFT N/A 7/2/2020    Procedure: Angiogram Coronary Graft;  Surgeon: Alex Lantigua MD;  Location: [facility] CARDIAC CATH LAB    CV CORONARY ANGIOGRAM N/A 7/2/2020    Procedure: CV CORONARY ANGIOGRAM;  Surgeon: Alex Lantigua MD;  Location: [facility] CARDIAC CATH LAB    CV CORONARY ANGIOGRAM N/A 7/20/2021    Procedure: CV CORONARY ANGIOGRAM;  Surgeon: Raimundo Hudson MD;  Location: [facility] CARDIAC CATH LAB    CV CORONARY ANGIOGRAM N/A 9/12/2022    Procedure: Coronary Angiogram- BIPLANE;  Surgeon: Raimundo Hudson MD;  Location: [facility] CARDIAC CATH LAB    CV CORONARY ANGIOGRAM N/A 7/17/2023    Procedure: Coronary Angiogram;  Surgeon: Rhina  MD Alex;  [facility]    CV HEART BIOPSY N/A 7/27/2020    Procedure: Heart Biopsy;  Surgeon: Mario Burr MD;  [facility]    CV HEART BIOPSY N/A 8/3/2020    Procedure: CV HEART BIOPSY;  Surgeon: Alex Lantigua MD;  [facility]    CV HEART BIOPSY N/A 8/10/2020    Procedure: CV HEART BIOPSY;  Surgeon: Mario Burr MD;  [facility]    CV HEART BIOPSY N/A 8/17/2020    Procedure: CV HEART BIOPSY;  Surgeon: Raimundo Hudson MD;  [facility]    CV HEART BIOPSY N/A 8/31/2020    Procedure: CV HEART BIOPSY;  Surgeon: Moises Santos MD;  [facility]    CV HEART BIOPSY N/A 9/14/2020    Procedure: CV HEART BIOPSY;  Surgeon: Raimundo Hudson MD;  [facility]    CV HEART BIOPSY N/A 9/28/2020    Procedure: CV HEART BIOPSY;  Surgeon: Raimundo Hudson MD;  [facility]    CV HEART BIOPSY N/A 10/12/2020    Procedure: CV HEART BIOPSY;  Surgeon: John Stuart MD;  [facility]    CV HEART BIOPSY N/A 11/10/2020    Procedure: CV HEART BIOPSY;  Surgeon: John Stuart MD;  [facility]    CV HEART BIOPSY N/A 12/7/2020    Procedure: CV HEART BIOPSY;  Surgeon: Raimundo Hudson MD;  [facility]    CV HEART BIOPSY N/A 1/5/2021    Procedure: CV HEART BIOPSY;  Surgeon: Raimundo Hudson MD;  [facility]    CV HEART BIOPSY N/A 7/20/2021    Procedure: CV HEART BIOPSY;  Surgeon: Raimundo Hudson MD;  [facility]    CV HEART BIOPSY N/A 9/28/2021    Procedure: CV HEART BIOPSY;  Surgeon: Raimundo Hudson MD;  Location:  [facility] CARDIAC CATH LAB    CV HEART BIOPSY N/A 9/12/2022    Procedure: Heart Biopsy;  Surgeon:  Raimundo Hudson MD;  [facility]    CV HEART BIOPSY N/A 7/17/2023    Procedure: Heart Biopsy;  Surgeon: Alex Lantigua MD;  Location:  [facility]ART CARDIAC CATH LAB    CV INTRA AORTIC BALLOON N/A 7/14/2020    Procedure: RHC WITH LEAVE IN SWAN/IABP;  Surgeon: Sonny Saleh MD;  Location:  [facility] CARDIAC CATH LAB    CV INTRAVASULAR ULTRASOUND N/A 9/12/2022    Procedure: Intravascular Ultrasound;  Surgeon: Raimundo Hudson MD;  Location:  [facility] CARDIAC CATH LAB    CV RIGHT HEART CATH MEASUREMENTS RECORDED N/A 3/25/2019    Procedure: CV RIGHT HEART CATH;  Surgeon: Moises Santos MD;  Location:  [facility]ART CARDIAC CATH LAB    CV RIGHT HEART CATH MEASUREMENTS RECORDED N/A 7/10/2019    Procedure: CV RIGHT HEART CATH;  Surgeon: Jak Mccabe MD;  Location:  HEART CARDIAC CATH LAB    CV RIGHT HEART CATH MEASUREMENTS RECORDED N/A 7/8/2020    Procedure: Right Heart Cath with Leave In [place] already has ICU load;  Surgeon: Jak Mccabe MD;  Location:  [facility]ART CARDIAC CATH LAB    CV RIGHT HEART CATH MEASUREMENTS RECORDED N/A 7/14/2020    Procedure: CV RIGHT HEART CATH;  Surgeon: Sonny Saleh MD;  Location:  [facility]ART CARDIAC CATH LAB    CV RIGHT HEART CATH MEASUREMENTS RECORDED N/A 7/2/2020    Procedure: CV RIGHT HEART CATH;  Surgeon: Alex Lantigua MD;  Location:  [facility] CARDIAC CATH LAB    CV RIGHT HEART CATH MEASUREMENTS RECORDED N/A 7/27/2020    Procedure: Right Heart Cath;  Surgeon: Mario Burr MD;  Location:  [facility] CARDIAC CATH LAB    CV RIGHT HEART CATH MEASUREMENTS RECORDED N/A 8/3/2020    Procedure: Right Heart Cath;  Surgeon: Alex Lantigua MD;  Location:  [facility]ART CARDIAC CATH LAB    CV RIGHT HEART CATH MEASUREMENTS RECORDED N/A 8/10/2020    Procedure: CV RIGHT HEART CATH;  Surgeon: Mario Burr MD;  Location:  [facility] CARDIAC CATH LAB    CV RIGHT HEART CATH MEASUREMENTS RECORDED N/A 8/17/2020    Procedure:  CV RIGHT HEART CATH;  Surgeon: Raimundo Hudson MD;  [facility]    CV RIGHT HEART CATH MEASUREMENTS RECORDED N/A 8/31/2020    Procedure: CV RIGHT HEART CATH;  Surgeon: Moises Santos MD;  [facility]    CV RIGHT HEART CATH MEASUREMENTS RECORDED N/A 9/14/2020    Procedure: CV RIGHT HEART CATH;  Surgeon: Raimundo Hudson MD;  Location:  [facility] CARDIAC CATH LAB    CV RIGHT HEART CATH MEASUREMENTS RECORDED N/A 9/28/2020    Procedure: CV RIGHT HEART CATH;  Surgeon: Raimundo Hudson MD;  Location:  [facility] CARDIAC CATH LAB    CV RIGHT HEART CATH MEASUREMENTS RECORDED N/A 10/12/2020    Procedure: CV RIGHT HEART CATH;  Surgeon: John Stuart MD;  Location:  [facility] CARDIAC CATH LAB    CV RIGHT HEART CATH MEASUREMENTS RECORDED N/A 11/10/2020    Procedure: CV RIGHT HEART CATH;  Surgeon: John Stuart MD;  Location:  [facility] CARDIAC CATH LAB    CV RIGHT HEART CATH MEASUREMENTS RECORDED N/A 12/7/2020    Procedure: CV RIGHT HEART CATH;  Surgeon: Raimundo Hudson MD;  Location:  [facility] CARDIAC CATH LAB    CV RIGHT HEART CATH MEASUREMENTS RECORDED N/A 1/5/2021    Procedure: CV RIGHT HEART CATH;  Surgeon: Raimundo Hudson MD;  Location:  [facility] CARDIAC CATH LAB    CV RIGHT HEART CATH MEASUREMENTS RECORDED N/A 7/20/2021    Procedure: CV RIGHT HEART CATH;  Surgeon: Raimundo Hudson MD;  Location:  [facility] CARDIAC CATH LAB    CV RIGHT HEART CATH MEASUREMENTS RECORDED N/A 9/28/2021    Procedure: CV RIGHT HEART CATH;  Surgeon: Raimundo Hudson MD;  Location:  [facility] CARDIAC CATH LAB    CV RIGHT HEART CATH MEASUREMENTS RECORDED N/A 9/12/2022    Procedure: Right Heart Catheterization;  Surgeon: Raimundo Hudson MD;  Location:  [facility] CARDIAC CATH LAB    CV RIGHT HEART CATH MEASUREMENTS RECORDED N/A 7/17/2023    Procedure: Right Heart Catheterization;  Surgeon:  Alex Lantigua MD;  [facility]    ESOPHAGOSCOPY, GASTROSCOPY, DUODENOSCOPY (EGD), COMBINED N/A 10/8/2024    Procedure: ESOPHAGOGASTRODUODENOSCOPY, WITH BIOPSY;  Surgeon: Rip Bryan MD;  [facility]    EXTRACTION(S) DENTAL Left 7/13/2020    Procedure: EXTRACTION, TOOTH #11, 12, 13, 15, and 29;  Surgeon: Monica Chao DDS;  [facility]    IMPLANT AUTOMATIC IMPLANTABLE CARDIOVERTER DEFIBRILLATOR      INCISION AND DRAINAGE STERNUM W/ IRRIGATION SYSTEM, COMBINED N/A 9/23/2020    Procedure: INCISION AND DRAINAGE, LEFT SUPRACLAVICULAR WOUND INFECTION AND ABDOMENN WOUND.;  Surgeon: Ran Huertas MD;  [facility]    INSERT INTRAAORTIC BALLOON PUMP N/A 7/16/2020    Procedure: Subclavian Intra-Aortic Balloon Pump Placement;  Surgeon: Allan Sparrow MD;  [facility]    IRRIGATION AND DEBRIDEMENT CHEST WASHOUT, COMBINED N/A 9/28/2020    Procedure: IRRIGATION AND DEBRIDEMENT OF LEFT UPPER CHEST WOUND.;  Surgeon: Ran Huertas MD;  [facility]    PHACOEMULSIFICATION CLEAR CORNEA WITH STANDARD INTRAOCULAR LENS IMPLANT Right 2/6/2023    Procedure: RIGHT EYE PHACOEMULSIFICATION, CATARACT, WITH INTRAOCULAR LENS IMPLANT with trypan blue;  Surgeon: Triny Bunch MD;  [facility]    PHACOEMULSIFICATION CLEAR CORNEA WITH STANDARD IOL, VITRECTOMY PARSPLANA 25 GAGUE, COMBINED Left 12/10/2019    Procedure: PHACOEMULSIFICATION, CATARACT, CLEAR CORNEAL INCISION APPROACH, W STD INTRAOCULAR LENS IMPLANT INSERT + VITRECTOMY BY PARS PLANA  USING 25-GAUGE INSTRUMENTS. ENDOLASER, INFUSION OF 20% SF6 GAS;  Surgeon: Triny Bunch MD;  [facility]    PICC DOUBLE LUMEN PLACEMENT Left 07/28/2020    5Fr - 42cm, Basilic vein, mid SVC    PICC INSERTION Right 07/11/2020    basilic 44 cm total     TRANSPLANT HEART RECIPIENT N/A 7/19/2020    Procedure: REDO MEDIAN STERNOTOMY, TRANSPLANT, ORTHOTOPIC HEART, RECIPIENT, ON PUMP OXYGENATOR, REMOVAL OF CARDIAC  DEFIBRILLATOR AND LEAD;  Surgeon: Griselli, Massimo, MD;  [facility]    VITRECTOMY, PARS PLANA APPROACH, USING 27-GAUGE INSTRUMENTS Left 12/21/2020    Procedure: 27 gauge pars plana vitectomy, membrane peel, perfluoroctane liquid (PFO), retinectomy, endolaser, Silicone Oil 1000 cs;  Surgeon: Triny Bunch MD;  Location:  OR    [facility] CABG, ARTERY-VEIN, THREE  02/2008     Social History     Socioeconomic History    Marital status:      Spouse name: None    Number of children: 4    Years of education: None    Highest education level: None   Occupational History    Occupation:      Employer: [employer]     Employer: RETIRED   Tobacco Use    Smoking status: Never     Passive exposure: Never    Smokeless tobacco: Never    Tobacco comments:     Never smoked; non-smoking household   Vaping Use    Vaping status: Never Used   Substance and Sexual Activity    Alcohol use: No     Alcohol/week: 0.0 standard drinks of alcohol    Drug use: No    Sexual activity: Yes     Partners: Female   Other Topics Concern    Parent/sibling w/ CABG, MI or angioplasty before 65F 55M? No     Social Drivers of Health     Financial Resource Strain: Low Risk  (12/16/2024)    Financial Resource Strain     Within the past 12 months, have you or your family members you live with been unable to get utilities (heat, electricity) when it was really needed?: No   Food Insecurity: Low Risk  (12/16/2024)    Food Insecurity     Within the past 12 months, did you worry that your food would run out before you got money to buy more?: No     Within the past 12 months, did the food you bought just not last and you didn t have money to get more?: No   Transportation Needs: Low Risk  (12/16/2024)    Transportation Needs     Within the past 12 months, has lack of transportation kept you from medical appointments, getting your medicines, non-medical meetings or appointments, work, or from getting things that you need?: No    Physical Activity: Inactive (12/16/2024)    Exercise Vital Sign     Days of Exercise per Week: 0 days     Minutes of Exercise per Session: 0 min   Stress: Stress Concern Present (12/16/2024)    [place] [employer] - Occupational Stress Questionnaire     Feeling of Stress : Rather much   Social Connections: Unknown (12/16/2024)    Social Connection and Isolation Panel [NHANES]     Frequency of Social Gatherings with Friends and Family: More than three times a week   Interpersonal Safety: Low Risk  (10/30/2024)    Interpersonal Safety     Do you feel physically and emotionally safe where you currently live?: Yes     Within the past 12 months, have you been hit, slapped, kicked or otherwise physically hurt by someone?: No     Within the past 12 months, have you been humiliated or emotionally abused in other ways by your partner or ex-partner?: No   Housing Stability: Low Risk  (12/16/2024)    Housing Stability     Do you have housing? : Yes     Are you worried about losing your housing?: No      Family History   Problem Relation Age of Onset    Diabetes Sister     Diabetes Sister     Diabetes Brother     Macular Degeneration No family hx of     Glaucoma No family hx of     Myocardial Infarction No family hx of     Kidney Disease No family hx of     Anesthesia Reaction No family hx of     Bleeding Disorder No family hx of     Venous thrombosis No family hx of      (Not in a hospital admission)    Current Facility-Administered Medications   Medication Dose Route Frequency Provider Last Rate Last Admin    acetaminophen (TYLENOL) tablet 975 mg  975 mg Oral TID Olga Denney PA   975 mg at 05/10/25 0944    aspirin (ASA) chewable tablet 81 mg  81 mg Oral Daily Olga Denney PA   81 mg at 05/10/25 0944    benzocaine-menthol (CHLORASEPTIC MAX) 15-10 MG lozenge 1 lozenge  1 lozenge Buccal Q1H PRN Olga Denney PA        bevacizumab (AVASTIN) intravitreal inj 1.25 mg  1.25 mg Intravitreal  Q28 Days Triny Bunch MD   1.25 mg at 05/07/25 1226    calcium carbonate (TUMS) chewable tablet 1,000 mg  1,000 mg Oral 4x Daily PRN Olga Denney PA        calcium carbonate-vitamin D (CALTRATE) 600-10 MG-MCG per tablet 1 tablet  1 tablet Oral BID w/meals Olga Denney PA   1 tablet at 05/10/25 0944    glucose gel 15-30 g  15-30 g Oral Q15 Min PRN Olga Denney PA        Or    dextrose 50 % injection 25-50 mL  25-50 mL Intravenous Q15 Min PRN Olga Denney PA        Or    glucagon injection 1 mg  1 mg Subcutaneous Q15 Min PRN Olga Denney PA        ferrous sulfate (FEROSUL) tablet 325 mg  325 mg Oral BID w/meals Olga Denney PA   325 mg at 05/10/25 0944    insulin aspart (NovoLOG) injection (RAPID ACTING)  1-7 Units Subcutaneous TID AC Olga Denney PA        insulin aspart (NovoLOG) injection (RAPID ACTING)  1-5 Units Subcutaneous At Bedtime Olga Denney PA        latanoprost (XALATAN) 0.005 % ophthalmic solution 1 drop  1 drop Both Eyes QPM Olga Denney PA   1 drop at 05/09/25 2149    lidocaine (LMX4) cream   Topical Q1H PRN Olga Denney PA        lidocaine 1 % 0.1-1 mL  0.1-1 mL Other Q1H PRN Olga Denney PA        lisinopril (ZESTRIL) tablet 5 mg  5 mg Oral Daily Olga Denney PA   5 mg at 05/10/25 0944    magnesium oxide (MAG-OX) tablet 400 mg  400 mg Oral BID Olga Denney PA   400 mg at 05/09/25 2139    mycophenolate (GENERIC EQUIVALENT) capsule 750 mg  750 mg Oral BID IS Castro Roberts MD        rosuvastatin (CRESTOR) tablet 10 mg  10 mg Oral Daily Olga Denney PA   10 mg at 05/10/25 0944    sirolimus (GENERIC EQUIVALENT) tablet 3 mg  3 mg Oral Daily Olga Denney PA   3 mg at 05/10/25 1034    sodium chloride (PF) 0.9% PF flush 3 mL  3 mL Intracatheter Q8H MUSA Olga Denney PA   3 mL at 05/10/25 0627    sodium chloride (PF) 0.9% PF flush 3 mL  3 mL Intracatheter q1 min prn Olga Denney PA         sulfamethoxazole-trimethoprim (BACTRIM) 400-80 MG per tablet 1 tablet  1 tablet Oral Once per day on Monday Wednesday Friday Olga Denney PA        tamsulosin (FLOMAX) capsule 0.4 mg  0.4 mg Oral QAM Olga Denney PA   0.4 mg at 05/10/25 0944    traMADol (ULTRAM) half-tab 25 mg  25 mg Oral Q6H PRN Olga Denney PA         Current Outpatient Medications   Medication Sig Dispense Refill    acetaminophen (TYLENOL) 325 MG tablet Take 3 tablets (975 mg) by mouth every 8 hours as needed for mild pain 60 tablet 3    Alcohol Swabs PADS Use to swab the area of the injection or sergio as directed   Per insurance coverage 100 each 0    aspirin (ASA) 81 MG chewable tablet Take 1 tablet (81 mg) by mouth daily 120 tablet 0    bisacodyl (DULCOLAX) 5 MG EC tablet Two days prior to exam take two (2) tablets at 4pm. One day prior to exam take two (2) tablets at 4pm 4 tablet 0    blood glucose (NO BRAND SPECIFIED) test strip Use to test blood sugar 4 times daily or as directed. 400 strip 3    blood glucose monitoring (ACCU-CHEK FELIPE SMARTVIEW) meter device kit Use to test blood sugar 3-4 times daily, as directed. 1 kit 0    blood glucose monitoring (SOFTCLIX) lancets 1 each 4 times daily Use to test blood sugars 2 times daily. 400 each 8    calcium carbonate-vitamin D (CALTRATE) 600-10 MG-MCG per tablet TAKE ONE TABLET BY MOUTH TWICE A DAY WITH MEALS 180 tablet 3    Continuous Glucose Sensor (DEXCOM G7 SENSOR) [facility] CAD 10 MCINTYRE 10 each 2    empagliflozin (JARDIANCE) 25 MG TABS tablet Take 1 tablet (25 mg) by mouth daily. 90 tablet 3    ferrous sulfate (FEROSUL) 325 (65 Fe) MG tablet Take 1 tablet (325 mg) by mouth 2 times daily (with meals) 180 tablet 3    furosemide (LASIX) 20 MG tablet Take 1 tablet (20 mg) by mouth daily. 90 tablet 3    HUMALOG KWIKPEN 100 UNIT/ML soln Give 10 units with breakfast, 10 units with lunch,  and 5 units with dinner.  Average daily use is 25 units 90 mL 3    Injection Device for  insulin (CEQUR SIMPLICITY 2U) KALI 1 each See Admin Instructions. Change patch every 2-3 days 40 each 4    Injection Device for Insulin (CEQUR SIMPLICITY ) MISC 1 each See Admin Instructions. Use with patches. Change patch every 2-3 days 1 each 1    insulin degludec (TRESIBA) 200 UNIT/ML pen Inject 60 Units Subcutaneous every morning Order pen needles too 90 mL 11    insulin pen needle (32G X 4 MM) 32G X 4 MM miscellaneous Use 5-6 pen needles daily or as directed. 600 each 2    ketorolac (ACULAR) 0.5 % ophthalmic solution Place 1 drop into the right eye 2 times daily 10 mL 3    latanoprost (XALATAN) 0.005 % ophthalmic solution Place 1 drop into both eyes daily. 5 mL 11    lisinopril (ZESTRIL) 5 MG tablet Take 1 tablet (5 mg) by mouth daily. 100 tablet 0    loperamide (IMODIUM A-D) 2 MG tablet Take 1 tablet (2 mg) by mouth 4 times daily as needed for diarrhea. 40 tablet 1    magnesium oxide 400 MG tablet Take 1 tablet (400 mg) by mouth 2 times daily 180 tablet 3    mycophenolate (GENERIC EQUIVALENT) 500 MG tablet Take 3 tablets (1,500 mg) by mouth 2 times daily 180 tablet 11    omega-3 acid ethyl esters (LOVAZA) 1 g capsule TOME 1 CAPSULA POR LA BOCA CADA LISA 30 capsule 11    polyethylene glycol (GOLYTELY) 236 g suspension Two days before procedure at 5PM fill first container with water. Mix and drink an 8 oz glass every 15 minutes until HALF of the container is gone. Place the remainder in the refrigerator. One day before procedure at 5PM drink second half of bowel prep. Drink an 8 oz glass every 15 minutes until it is gone. Day of procedure 6 hours before arrival time fill the 2nd container with water. Mix and drink an 8 oz glass every 15 minutes until HALF of the container is gone. Discard the remaining solution. 8000 mL 0    rosuvastatin (CRESTOR) 20 MG tablet Take 1 tablet (20 mg) by mouth daily. 100 tablet 0    senna-docusate (SENOKOT-S/PERICOLACE) 8.6-50 MG tablet Take 1 tablet by mouth 2 times daily  as needed (hold for loose stools) 60 tablet 3    sirolimus (GENERIC EQUIVALENT) 0.5 MG tablet Take 6 tablets (3 mg) by mouth daily 180 tablet 11    sulfamethoxazole-trimethoprim (BACTRIM) 400-80 MG tablet Take 1 tablet by mouth three times a week. 45 tablet 3    tamsulosin (FLOMAX) 0.4 MG capsule Take 1 capsule (0.4 mg) by mouth every morning. 90 capsule 3       Objective   Physical Exam:    Blood pressure 90/56, pulse 103, temperature 98.1  F (36.7  C), temperature source Oral, resp. rate 18, SpO2 99%.    Exam:  General: NAD  HEENT: no scleral icterus or injection  CARDIAC: RRR, no murmurs. No JVD  RESP:  CTAB, no rales, wheezes, rhonchi.  GI: Soft, non-distended, nontender, normal bowel sounds present, no hepatomegaly.  : No villalobos  EXTREMITIES: no peripheral LE edema. B UEs and B LEs warm and dry.  SKIN: No acute lesions appreciated  NEURO: No focal deficits    Labs & Studies: I personally reviewed the following studies:  CMP  Recent Labs   Lab 05/10/25  1202 05/10/25  0931 05/10/25  0736 05/09/25  2314 05/09/25  2147 05/09/25  1625   NA  --  129*  --   --   --  128*   POTASSIUM  --  2.8*  --  3.3*  --  2.6*   CHLORIDE  --  99  --   --   --  92*   CO2  --  11*  --   --   --  13*   ANIONGAP  --  19*  --   --   --  23*   * 203* 113*  --  156* 194*   BUN  --  98.6*  --   --   --  93.8*   CR  --  4.50*  --   --   --  4.88*   GFRESTIMATED  --  13*  --   --   --  12*   BRYANNA  --  8.5*  --   --   --  8.6*   MAG  --   --   --   --   --  1.7   PROTTOTAL  --   --   --   --   --  7.7   ALBUMIN  --   --   --   --   --  4.6   BILITOTAL  --   --   --   --   --  0.3   ALKPHOS  --   --   --   --   --  174*   AST  --   --   --   --   --  13   ALT  --   --   --   --   --  9     CBC  Recent Labs   Lab 05/10/25  0931 05/09/25  1625   WBC 9.2 11.5*   RBC 3.73* 4.10*   HGB 10.1* 11.0*   HCT 29.5* 32.9*   MCV 79 80   MCH 27.1 26.8   MCHC 34.2 33.4   RDW 15.9* 16.0*    323     INRNo lab results found in last 7 days.         Latest Ref Rng & Units 7/9/2019     7:22 AM   PFT   FVC L 2.25    FEV1 L 1.85    FVC% % 68    FEV1% % 71          No results found for this or any previous visit (from the past 4320 hours).    Imaging:  Reviewed    EXAMINATION: CT ABDOMEN PELVIS W/O CONTRAST, 5/9/2025 4:43 PM     INDICATION: Abd pain, weakness.  GFR < 30     COMPARISON STUDY: 10/11/2023, 7/9/2019     TECHNIQUE: CT scan of the abdomen and pelvis was performed on  multidetector CT scanner using volumetric acquisition technique and  images were reconstructed in multiple planes with variable thickness  and reviewed on dedicated workstations.      CONTRAST: None injected IV without oral contrast     CT scan radiation dose is optimized to minimum requisite dose using  automated dose modulation techniques.     FINDINGS:     Lower thorax: Small bilateral calcified granulomas.     Liver: No mass. No intrahepatic biliary ductal dilation.     Biliary System: Cholelithiasis without cholecystitis.     Pancreas: No mass or pancreatic ductal dilation.     Adrenal glands: No mass or nodules     Spleen: Normal.     Kidneys: No suspicious mass, obstructing calculus or hydronephrosis.     Gastrointestinal tract: Normal appendix. Normal caliber small bowel.   Numerous duodenal and jejunal diverticula without inflammation.     Mesentery/peritoneum/retroperitoneum: No mass. No free fluid or air.     Lymph nodes: No significant lymphadenopathy.     Pelvis: Urinary bladder is normal.     Osseous structures: No aggressive or acute osseous lesion.  In the  proximal right femur (3/65) there is a benign-appearing osseous lesion  which is unchanged since 7/19/2019 and is not likely to be clinically  significant.      Soft tissues: Within normal limits.  Bilateral chronic mastoid.                                                                   IMPRESSION:   1. Fluid-filled colon which is nonspecific but can be seen with  enterocolitis. No additional acute findings in the  abdomen or pelvis.  Normal appendix.  2. Cholelithiasis without evidence of cholecystitis.  3. Duodenal and jejunal diverticula without evidence of  diverticulitis.

## 2025-05-10 NOTE — PROGRESS NOTES
Status: Full Code   VS: BP 90/56 (BP Location: Right arm, Patient Position: Semi-Borja's)   Pulse 103   Temp 98.1  F (36.7  C) (Oral)   Resp 18   SpO2 99%   Neuros: A&OX4  Cardiac: WNL, Potassium supplemented via IV, SR on tele  Respiratory: WNL pt RA  GI: stool specimen sent to lab, noted to be green and loose. Enteric panel & Cdiff negative  : voiding spontaneously, urine sent to lab, negative  Diet/Nausea: pt denies nausea  Skin: WNL    LDA: PIV S.L.  Pain: control with PRN Tylenol.   Activity: SBA/ind   New changes this shift: no new change this shift   Plan: continues with trending troponin's, tele monitoring, and monitoring for any changes.

## 2025-05-10 NOTE — PLAN OF CARE
Goal Outcome Evaluation:      Plan of Care Reviewed With: patient    Overall Patient Progress: no changeOverall Patient Progress: no change    /57 (BP Location: Right arm, Patient Position: Sitting, Cuff Size: Adult Regular)   Pulse 88   Temp 97.8  F (36.6  C) (Oral)   Resp 16   SpO2 98%      Pt is A&O X4, cooperative with cares, and communicate needs appropriately. VSS. Denies pain & nausea and tolerating combination regular diet with good appetite. B, 180, covered with sliding scale insulin. Up with SBA to the bathroom. Voiding adequately. Pt reported having 2 loose stools today. K+= 2.8, replaced, recheck scheduled for 1900 today.

## 2025-05-10 NOTE — CONSULTS
[facility] EMERGENCY DEPARTMENT  [address] [address]  Phone: [phone]    Patient:  Lucian Oliveira, Date of birth 1955  Date of Visit:  05/10/2025  Referring Provider No ref. provider found  Reason for visit: diarrhea    Assessment & Plan    Recommendations:   Continue supportive management given his clinical improvement, okay to use imodium from my perspective.   Okay to hold off on adding antimicrobials for now.   He has not had exposure to Cryptosporidium, Giardia, Microsporidia, or other parasites but if diarrhea continues we can send tests for these.   Obtain IgG level (added order)  Agree with CMV and EBV PCR , in process  He is due to have a repeat colonoscopy so if he continues to have diarrhea this can be a consideration  Agree with dose reducing the Mycophenolate. Medication induced diarrhea is also a consideration.  Currently tmp/smx held with DAYA  Transplant ID will continue to follow. Recommendations discussed with primary team, cardiology, and patient.     Alicia Adkins D.O.   Infectious Diseases  Contact information available via [facility] Paging/Directory       Assessment:  69 y/o male with a history of OHT 7/19/2020, HIT s/p PLEX, CKD, Type II DM, vitreous hemorrhage, Norovirus and EAEC (2024) who developed acute onset of diarrhea on 5/2.     Diarrhea, improved: acute onset 5/2 with liquid stools, 6-7 per day. C diff, enteric pathogen panel. No unusual exposures to uncooked food, untreated water, travel, or sick contacts. CMV D+/R+ - no history of CMV reactivation. 5/9 CT without small bowel wall thickness, diverticulitis, cholecystitis, or intraabdominal process. It does mention fluid filled colon but this is not specific, radiologist suggests enterocolitis. Last colonoscopy was in 10/2024 and this needs to be repeated due to poor prep. Since admission the frequency of bowel movements has decreased w/ supportive management. His abdominal pain and  leukocytosis hav also resolved. At this time given his clinical improvement I think we can hold off on starting antimicrobials, continue to provide supportive care, and monitor closely for ongoing improvement. He is also on Mycophenolate which can cause Mycophenolate - induced colitis which can be a consideration if diarrhea persists if an infectious etiology is not identified.     DAYA: likely due to diarrhea. Nephrology is following.     Transplant check list:  Current immunosuppression: sirolimus, mycophenolate (dose reduced by cards)  Viral serostatus: CMV D+/R+, EBV D+/R+, HSV 1+/2-, VZV +  Other serostatus: Toxoplasma D+/R-, on bactrim  Fungal Prophylaxis: none  Bacterial prophylaxis: none  PJP prophylaxis: tmp/smx  Immunizations:RSV vaccine candidate  IgG:10/12/23 668  Qtc: 442 ms on 5/9/25     History of Present Illness   Pertinent history obtain from: chart review and patient    67 y/o male with a history of OHT 7/19/202, HIT s/p PLEX, CKD, Type II DM, vitreous hemorrhage, Norovirus and EAEC (2024) who developed acute onset of diarrhea on 5/2. At home he was having 6-7 episodes of diarrhea per day. He describes drinking orange juice and then it occurred. He does not describe new or uncooked food the night before or the AM before the diarrhea started. Lives with his wife who cooks for him. She did not have symptoms and no other sick contacts including children. Does not eat raw or undercooked food. He drinks city water. No camping, pool or hot tube use, or being outdoors. He has a dog at home. No recent travel. Last went to [place] 2 years ago. No visitors from [place] recently.     On 5/7 he was seen by PCP for diarrhea and C diff testing negative. On 5/9 he was seen again and it was noted that on exam he had generalized abdominal tenderness w/ palpation. Recommended that he go to the ED. WBC 11.5. Hgb stable. Creatinine increased to 4.88, low sodium, low potassium, slightly elevated alk phos. Enteric pathogen  panel negative. CT A/P normal caliber small bowel. Duodenal and jejunal diverticula w/o inflammation, cholelithiasis. Noted fluid filled colon, which is a non-specific finding, but possibly seen in enterocolitis. Not yet started on antibiotics.     Since being in the hospital his abdominal pain has resolved. He has had 2 bowel movements today that were liquid. Denies subjective fevers, chills, night sweats, cough, abdominal pain, nauseam vomiting, dysuria, joint pain.     10/2024 colonoscopy - limited due to poor prep but to mass seen. No specimens sent. EGD w/ hiatal hernia, duodenum biopsy w/o small bowel mucosa abnormality.     Immunosuppression sirolimus, mycophenolate  Antimicrobial prophylaxis: tmp/smx 3 times weekly (toxoplasma IgG+)  No new medications per his report     used    Physical Exam     Vital signs:  BP 90/56 (BP Location: Right arm, Patient Position: Semi-Borja's)   Pulse 103   Temp 98.1  F (36.7  C) (Oral)   Resp 18   SpO2 99%     GENERAL: alert and no distress, pleasant, sitting in bed, well appearing  ENT: dentition okay, some missing teeth but okay otherwise  NECK: no adenopathy  RESP: lungs clear to auscultation - no rales, rhonchi or wheezes, RA, no accessory muscle   CV: regular rate and rhythm, normal S1 S2, no murmur, no peripheral edema  ABDOMEN: soft, nontender non-distended, bowel sounds normal  MS: no gross musculoskeletal defects noted, no edema  SKIN: peripheral IV in place w/o surrounding erythema  NEURO: Normal strength and tone, mentation intact and speech normal    Data   Laboratory data and imaging listed below was reviewed prior to this encounter.     Microbiology:    Culture   Date Value Ref Range Status   10/11/2023 No Growth  Final   10/11/2023 No Growth  Final   , Urine Studies:    Recent Labs   Lab Test 05/10/25  0256 12/05/24  1306 10/12/23  0258 07/28/23  1226 09/28/21  0926   LEUKEST Negative Negative Negative Negative Negative   WBCU 3 1 3 <1 0   ,  Hematology Studies:    Recent Labs   Lab Test 05/10/25  0931 05/09/25  1625 12/05/24  1159 07/22/24  0711 04/29/24  0736 04/16/24  1033 11/17/23  1359 11/17/23  0556 11/16/23  1914 11/16/23  1022 11/08/23  1053 11/08/23  0908   WBC 9.2 11.5* 7.7 6.6  --  3.7*  --  3.8* 4.2 3.4*  --  4.1   ANEU  --  9.8*  --  4.7  --  2.2  --   --  3.0 2.2  --  2.4   AEOS  --  0.0  --  0.1  --  0.1  --   --  0.1 0.0  --  0.0   HGB 10.1* 11.0* 10.5* 10.7* 10.2* 10.2*   < > 6.9*  6.9* 5.8* 6.4*   < > 6.9*   MCV 79 80 89 91  --  90  --  89 89 87  --  84    323 230 219  --  188  --  139* 165 146*  --  150    < > = values in this interval not displayed.    , Metabolic Studies:   Recent Labs   Lab Test 05/10/25  0931 05/09/25 2314 05/09/25 1625 12/05/24  1159 07/22/24  0711 04/16/24  1033   *  --  128* 137 141 138   POTASSIUM 2.8* 3.3* 2.6* 4.4 3.6 4.8   CHLORIDE 99  --  92* 99 105 104   CO2 11*  --  13* 28 26 23   BUN 98.6*  --  93.8* 30.5* 34.8* 34.1*   CR 4.50*  --  4.88* 2.35* 2.25* 2.22*   GFRESTIMATED 13*  --  12* 29* 31* 31*   , Hepatic Studies:   Recent Labs   Lab Test 05/09/25  1625 12/05/24  1159 07/22/24  0711 04/16/24  1033 01/15/24  1511 11/16/23  1914 10/25/23  1005 10/11/23  1128 10/10/23  1011 07/17/23  0908   BILITOTAL 0.3  --  0.3 0.3  --  0.2 0.2 0.2  --  0.3   ALKPHOS 174*  --  119 110  --  129 138* 124  --  122   ALBUMIN 4.6 4.0 3.8 4.0 3.6 3.6 3.7 4.0   < > 4.0   AST 13  --  15 15  --   --  17 22  --  24   ALT 9  --  8 9  --   --  13 20  --  17    < > = values in this interval not displayed.   , HSV 1/2 IgG:   Recent Labs   Lab Test 07/08/19  1021   H1IGG >8.0*   H2IGG <0.2   , CMV IgG:   Recent Labs   Lab Test 07/19/20  1613 07/08/19  1021   CMVIGG >8.0* >8.0*   , and EBV VCA:   Recent Labs   Lab Test 07/19/20  1613 07/08/19  1021   EBVCAG 3.2* 2.4*                    yes

## 2025-05-10 NOTE — CONSULTS
Nephrology Initial Consult  May 10, 2025      Lucian Oliveira MRN:[id number] YOB: 1955  Date of Admission:5/9/2025  Primary care provider: Fracisco Casiano  Requesting physician: Madisyn Kennedy DO    Reason for consult: DAYA    MEDICAL DECISION MAKING     RECOMMENDATIONS:  Switch IV fluid to isotonic bicarb gtt (ordered)    ASSESSMENT:  Lucian Oliveira is a 69 year old ICM s/p OHT (2020), T2DM, CKD, HTN, admitted for diarrhea and DAYA    DAYA on CKD 3b  sCr on admit 4.88.  Baseline sCr 2.25  UA: minimal protein, MANY hyaline casts  Imaging: no hydro on CT a/p  Likely 2/2 hypovolemia in setting of significant diarrhea, may also be due to supratheraputic sirolimus (level pending)    Anemia: hgb 10.1  Volume/HTN: BP OK, appears dry  Acidosis: bicarb 111  MBD: Ca  8.5    Metabolic acidosis   Hypokalemia   Most driven by GI losses    #Diarrhea, acute   #Ischemic Cardiomyopathy s/p OHT 7/2020    Recommendations were communicated to primary team via note     Seen and discussed with Dr. Sajan Paiz MD  Division of Renal Disease and Hypertension  [facility]  myairmail  Vocera Web Console    SUBJECTIVE     HISTORY OF PRESENT ILLNESS:  Admitting provider and nursing notes reviewed  Lucian Oliveira is a 69 year old ICM s/p OHT (2020), T2DM, CKD, HTN, admitted for diarrhea and DAYA.  Has been having watery diarrhea for 8 days, 7-8 episodes per day. No blood. Infectious work up neg, concern for MMF as cause   No SOB. No swelling. Still having diarrhea. Taking PO.    REVIEW OF SYSTEMS:  A comprehensive review of systems was negative except as noted above.    PAST MEDICAL HISTORY:  Past Medical History:   Diagnosis Date    CAD (coronary artery disease)     CHF (congestive heart failure) (H)     CKD (chronic kidney disease), stage III (H)     Cortical cataract of both eyes     Diabetes (H)     E. coli colitis 10/12/2023    Hyperlipidemia     Hypertension     Infection due to  Streptococcus mitis group 09/23/2020    Ischemic cardiomyopathy     Norovirus 10/12/2023    Obesity     CHANTEL (obstructive sleep apnea)     occas cpap    CHANTEL (obstructive sleep apnea)- severe (AHI 30)     Osteoarthritis        Past Surgical History:   Procedure Laterality Date    CATARACT IOL, RT/LT      COLONOSCOPY N/A 8/7/2019    Procedure: COLONOSCOPY, WITH POLYPECTOMY AND BIOPSY;  Surgeon: Chauncey Morataya MD;  [facility]    COLONOSCOPY N/A 5/12/2023    Procedure: Colonoscopy;  Surgeon: Mariano Velasquez MD;  [facility]    COLONOSCOPY N/A 10/8/2024    Procedure: Colonoscopy;  Surgeon: Rip Bryan MD;  [facility]    CV ANGIOGRAM CORONARY GRAFT N/A 7/2/2020    Procedure: Angiogram Coronary Graft;  Surgeon: Alex Lantigua MD;  [facility]    CV CORONARY ANGIOGRAM N/A 7/2/2020    Procedure: CV CORONARY ANGIOGRAM;  Surgeon: Alex Lantigua MD;  [facility]    CV CORONARY ANGIOGRAM N/A 7/20/2021    Procedure: CV CORONARY ANGIOGRAM;  Surgeon: Raimundo Hudson MD;  [facility]    CV CORONARY ANGIOGRAM N/A 9/12/2022    Procedure: Coronary Angiogram- BIPLANE;  Surgeon: Raimundo Hudson MD;  [facility]    CV CORONARY ANGIOGRAM N/A 7/17/2023    Procedure: Coronary Angiogram;  Surgeon: Alex Lantigua MD;  [facility]    CV HEART BIOPSY N/A 7/27/2020    Procedure: Heart Biopsy;  Surgeon: Mario Burr MD;  [facility]    CV HEART BIOPSY N/A 8/3/2020    Procedure: CV HEART BIOPSY;  Surgeon: Alex Lantigua MD;  [facility]    CV HEART BIOPSY N/A 8/10/2020    Procedure: CV HEART BIOPSY;  Surgeon: Mario Burr MD;  [facility]    CV HEART BIOPSY N/A 8/17/2020    Procedure: CV HEART BIOPSY;  Surgeon: Raimundo Hudson MD;   Location:  [facility]ARD[facility] C[facility]    CV HEART BIOPSY N/A 8/31/2020    Procedure: CV HEART BIOPSY;  Surgeon: Moises Santos MD;  Location:  [facility]ARD[facility] CATH [facility]    CV HEART BIOPSY N/A 9/14/2020    Procedure: CV HEART BIOPSY;  Surgeon: Raimundo Hudson MD;  Location:  [facility]    CV HEART BIOPSY N/A 9/28/2020    Procedure: CV HEART BIOPSY;  Surgeon: Raimundo Hudson MD;  Location:  [facility]    CV HEART BIOPSY N/A 10/12/2020    Procedure: CV HEART BIOPSY;  Surgeon: John Stuart MD;  Location:  [facility]ARD[facility] CATH [facility]    CV HEART BIOPSY N/A 11/10/2020    Procedure: CV HEART BIOPSY;  Surgeon: John Stuart MD;  Location:  [facility]    CV HEART BIOPSY N/A 12/7/2020    Procedure: CV HEART BIOPSY;  Surgeon: Raimundo Hudson MD;  Location:  [facility]ARD[facility] CATH [facility]    CV HEART BIOPSY N/A 1/5/2021    Procedure: CV HEART BIOPSY;  Surgeon: Raimundo Hudson MD;  Location:  [facility]ARD[facility] CATH [facility]    CV HEART BIOPSY N/A 7/20/2021    Procedure: CV HEART BIOPSY;  Surgeon: Raimundo Hudson MD;  Location:  [facility]    CV HEART BIOPSY N/A 9/28/2021    Procedure: CV HEART BIOPSY;  Surgeon: Raimundo Hudson MD;  Location:  [facility]ATH [facility]    CV HEART BIOPSY N/A 9/12/2022    Procedure: Heart Biopsy;  Surgeon: Raimundo Hudson MD;  Location:  [facility]    CV HEART BIOPSY N/A 7/17/2023    Procedure: Heart Biopsy;  Surgeon: Alex Lantigua MD;  Location:  [facility] CATH [facility]    CV INTRA AORTIC BALLOON N/A 7/14/2020    Procedure: RHC WITH LEAVE IN SWAN/IABP;  Surgeon: Sonny Saleh MD;  Location:  [facility] CARDI[facility] CATH LAB    CV INTRAVASULAR ULTRASOUND N/A 9/12/2022    Procedure: Intravascular Ultrasound;  Surgeon: Raimundo Hudson MD;  [facility]    CV RIGHT HEART CATH MEASUREMENTS RECORDED  N/A 3/25/2019    Procedure: CV RIGHT HEART CATH;  Surgeon: Moises Santos MD;  [facility]    CV RIGHT HEART CATH MEASUREMENTS RECORDED N/A 7/10/2019    Procedure: CV RIGHT HEART CATH;  Surgeon: Jak Mccabe MD;  [facility]    CV RIGHT HEART CATH MEASUREMENTS RECORDED N/A 7/8/2020    Procedure: Right Heart Cath with Leave In [place] already has ICU load;  Surgeon: Jak Mccabe MD;  [facility]    CV RIGHT HEART CATH MEASUREMENTS RECORDED N/A 7/14/2020    Procedure: CV RIGHT HEART CATH;  Surgeon: Sonny Saleh MD;  Location:  [facility]ARD[facility] CATH LAB    CV RIGHT HEART CATH MEASUREMENTS RECORDED N/A 7/2/2020    Procedure: CV RIGHT HEART CATH;  Surgeon: Alex Lantigua MD;  Location:  [facility] CARDI[facility] CATH LAB    CV RIGHT HEART CATH MEASUREMENTS RECORDED N/A 7/27/2020    Procedure: Right Heart Cath;  Surgeon: Mario Burr MD;  Location:  [facility] CARDI[facility] CATH LAB    CV RIGHT HEART CATH MEASUREMENTS RECORDED N/A 8/3/2020    Procedure: Right Heart Cath;  Surgeon: Alex Lantigua MD;  Location:  [facility] CARD[facility] CATH LAB    CV RIGHT HEART CATH MEASUREMENTS RECORDED N/A 8/10/2020    Procedure: CV RIGHT HEART CATH;  Surgeon: Mario Burr MD;  Location:  [facility] CARD[facility] CATH LAB    CV RIGHT HEART CATH MEASUREMENTS RECORDED N/A 8/17/2020    Procedure: CV RIGHT HEART CATH;  Surgeon: Raimundo Hudson MD;  Location:  [facility]    CV RIGHT HEART CATH MEASUREMENTS RECORDED N/A 8/31/2020    Procedure: CV RIGHT HEART CATH;  Surgeon: Moises Santos MD;  Location:  [facility]ARD[facility] CATH LAB    CV RIGHT HEART CATH MEASUREMENTS RECORDED N/A 9/14/2020    Procedure: CV RIGHT HEART CATH;  Surgeon: Raimundo Hudson MD;  Location:  [facility]ARD[facility] CATH LAB    CV RIGHT HEART CATH MEASUREMENTS RECORDED N/A 9/28/2020    Procedure: CV RIGHT HEART CATH;  Surgeon: Raimundo Hudson  MD;  Location:  [facility] CARD[facility] CATH LAB    CV RIGHT HEART CATH MEASUREMENTS RECORDED N/A 10/12/2020    Procedure: CV RIGHT HEART CATH;  Surgeon: John Stuart MD;  Location:  [facility] CARD[facility] CATH LAB    CV RIGHT HEART CATH MEASUREMENTS RECORDED N/A 11/10/2020    Procedure: CV RIGHT HEART CATH;  Surgeon: John Stuart MD;  Location:  HEART CARDIAC CATH LAB    CV RIGHT HEART CATH MEASUREMENTS RECORDED N/A 12/7/2020    Procedure: CV RIGHT HEART CATH;  Surgeon: Raimundo Hudson MD;  Location:  [facility] CARDI[facility] CATH LAB    CV RIGHT HEART CATH MEASUREMENTS RECORDED N/A 1/5/2021    Procedure: CV RIGHT HEART CATH;  Surgeon: Raimundo Hudson MD;  Location:  [facility] CARDI[facility] CATH LAB    CV RIGHT HEART CATH MEASUREMENTS RECORDED N/A 7/20/2021    Procedure: CV RIGHT HEART CATH;  Surgeon: Raimundo Hudson MD;  Location:  [facility]ART CARDIAC CATH LAB    CV RIGHT HEART CATH MEASUREMENTS RECORDED N/A 9/28/2021    Procedure: CV RIGHT HEART CATH;  Surgeon: Raimundo Hudson MD;  Location:  [facility]ART CARD[facility] CATH LAB    CV RIGHT HEART CATH MEASUREMENTS RECORDED N/A 9/12/2022    Procedure: Right Heart Catheterization;  Surgeon: Raimundo Hudson MD;  Location:  [facility]ART CARDIAC CATH LAB    CV RIGHT HEART CATH MEASUREMENTS RECORDED N/A 7/17/2023    Procedure: Right Heart Catheterization;  Surgeon: Alex Lantigua MD;  Location: [facility] CARDIAC CATH LAB    ESOPHAGOSCOPY, GASTROSCOPY, DUODENOSCOPY (EGD), COMBINED N/A 10/8/2024    Procedure: ESOPHAGOGASTRODUODENOSCOPY, WITH BIOPSY;  Surgeon: Rip Bryan MD;  Location:  GI    EXTRACTION(S) DENTAL Left 7/13/2020    Procedure: EXTRACTION, TOOTH #11, 12, 13, 15, and 29;  Surgeon: Monica Chao DDS;  Location:  OR    IMPLANT AUTOMATIC IMPLANTABLE CARDIOVERTER DEFIBRILLATOR      INCISION AND DRAINAGE STERNUM W/ IRRIGATION SYSTEM, COMBINED N/A 9/23/2020    Procedure: INCISION AND DRAINAGE, LEFT  SUPRACLAVICULAR WOUND INFECTION AND ABDOMENN WOUND.;  Surgeon: Ran Huertas MD;  [facility]    INSERT INTRAAORTIC BALLOON PUMP N/A 7/16/2020    Procedure: Subclavian Intra-Aortic Balloon Pump Placement;  Surgeon: Allan Sparrow MD;  [facility]    IRRIGATION AND DEBRIDEMENT CHEST WASHOUT, COMBINED N/A 9/28/2020    Procedure: IRRIGATION AND DEBRIDEMENT OF LEFT UPPER CHEST WOUND.;  Surgeon: Ran Huertas MD;  [facility]    PHACOEMULSIFICATION CLEAR CORNEA WITH STANDARD INTRAOCULAR LENS IMPLANT Right 2/6/2023    Procedure: RIGHT EYE PHACOEMULSIFICATION, CATARACT, WITH INTRAOCULAR LENS IMPLANT with trypan blue;  Surgeon: Triny Bunch MD;  [facility]    PHACOEMULSIFICATION CLEAR CORNEA WITH STANDARD IOL, VITRECTOMY PARSPLANA 25 GAGUE, COMBINED Left 12/10/2019    Procedure: PHACOEMULSIFICATION, CATARACT, CLEAR CORNEAL INCISION APPROACH, W STD INTRAOCULAR LENS IMPLANT INSERT + VITRECTOMY BY PARS PLANA  USING 25-GAUGE INSTRUMENTS. ENDOLASER, INFUSION OF 20% SF6 GAS;  Surgeon: Triny Bunch MD;  [facility]    PICC DOUBLE LUMEN PLACEMENT Left 07/28/2020    5Fr - 42cm, Basilic vein, mid SVC    PICC INSERTION Right 07/11/2020    basilic 44 cm total     TRANSPLANT HEART RECIPIENT N/A 7/19/2020    Procedure: REDO MEDIAN STERNOTOMY, TRANSPLANT, ORTHOTOPIC HEART, RECIPIENT, ON PUMP OXYGENATOR, REMOVAL OF CARDIAC DEFIBRILLATOR AND LEAD;  Surgeon: Griselli, Massimo, MD;  [facility]    VITRECTOMY, PARS PLANA APPROACH, USING 27-GAUGE INSTRUMENTS Left 12/21/2020    Procedure: 27 gauge pars plana vitectomy, membrane peel, perfluoroctane liquid (PFO), retinectomy, endolaser, Silicone Oil 1000 cs;  Surgeon: Triny Bunch MD;  [facility]    [facility] CABG, ARTERY-VEIN, THREE  02/2008        MEDICATIONS:  PTA Meds  Prior to Admission medications    Medication Sig Last Dose Taking? Auth Provider Long Term End Date   acetaminophen (TYLENOL) 325 MG tablet Take 3 tablets (975 mg) by  mouth every 8 hours as needed for mild pain Past Week Yes Arvin Sheridan MD     Alcohol Swabs PADS Use to swab the area of the injection or sergio as directed   Per insurance coverage  Yes Donald Rand PA-C     aspirin (ASA) 81 MG chewable tablet Take 1 tablet (81 mg) by mouth daily 5/9/2025 Morning Yes Ameya Agustin PA     bisacodyl (DULCOLAX) 5 MG EC tablet Two days prior to exam take two (2) tablets at 4pm. One day prior to exam take two (2) tablets at 4pm  Yes Nas Lal MD No    blood glucose (NO BRAND SPECIFIED) test strip Use to test blood sugar 4 times daily or as directed.  Yes Jacque Smith PA-C     blood glucose monitoring (ACCU-CHEK FELIPE SMARTVIEW) meter device kit Use to test blood sugar 3-4 times daily, as directed.  Yes Anna Archuleta PA-C     blood glucose monitoring (SOFTCLIX) lancets 1 each 4 times daily Use to test blood sugars 2 times daily.  Yes Marisabel Prieto PA-C Yes    calcium carbonate-vitamin D (CALTRATE) 600-10 MG-MCG per tablet TAKE ONE TABLET BY MOUTH TWICE A DAY WITH MEALS 5/9/2025 Morning Yes Arvin Sheridan MD     Continuous Glucose Sensor (DEXCOM G7 SENSOR) [facility] MCINTYRE  Yes Marisabel Prieto PA-C Yes    empagliflozin (JARDIANCE) 25 MG TABS tablet Take 1 tablet (25 mg) by mouth daily. 5/9/2025 Morning Yes Marisabel Prieto PA-C Yes    ferrous sulfate (FEROSUL) 325 (65 Fe) MG tablet Take 1 tablet (325 mg) by mouth 2 times daily (with meals) 5/9/2025 Morning Yes Arvin Sheridan MD No    furosemide (LASIX) 20 MG tablet Take 1 tablet (20 mg) by mouth daily. 5/9/2025 Morning Yes Arvin Sheridan MD Yes    HUMALOG KWIKPEN 100 UNIT/ML soln Give 10 units with breakfast, 10 units with lunch,  and 5 units with dinner.  Average daily use is 25 units Past Week Yes Marisabel Prieto PA-C Yes    Injection Device for insulin (CEQUR SIMPLICITY 2U) KALI 1 each See Admin Instructions. Change patch every 2-3 days  Yes  Marisabel Prieto PA-C     Injection Device for Insulin (CEQUR SIMPLICITY ) MISC 1 each See Admin Instructions. Use with patches. Change patch every 2-3 days  Yes Marisabel Prieto PA-C     insulin degludec (TRESIBA) 200 UNIT/ML pen Inject 60 Units Subcutaneous every morning Order pen needles too 5/9/2025 Morning Yes Marisabel Prieto PA-C     insulin pen needle (32G X 4 MM) 32G X 4 MM miscellaneous Use 5-6 pen needles daily or as directed.  Yes Marisabel Prieto PA-C No    ketorolac (ACULAR) 0.5 % ophthalmic solution Place 1 drop into the right eye 2 times daily 5/9/2025 Morning Yes Vince Cat MD     latanoprost (XALATAN) 0.005 % ophthalmic solution Place 1 drop into both eyes daily. 5/9/2025 Morning Yes Triny Bunch MD Yes    lisinopril (ZESTRIL) 5 MG tablet Take 1 tablet (5 mg) by mouth daily. 5/6/2025 Yes Arvin Sheridan MD Yes    loperamide (IMODIUM A-D) 2 MG tablet Take 1 tablet (2 mg) by mouth 4 times daily as needed for diarrhea. Unknown Yes Fracisco Casiano MD     magnesium oxide 400 MG tablet Take 1 tablet (400 mg) by mouth 2 times daily 5/9/2025 Morning Yes Arvin Sheridan MD     mycophenolate (GENERIC EQUIVALENT) 500 MG tablet Take 3 tablets (1,500 mg) by mouth 2 times daily 5/9/2025 Morning Yes Arvin Sheridan MD Yes    omega-3 acid ethyl esters (LOVAZA) 1 g capsule TOME 1 CAPSULA POR LA BOCA CADA LISA 5/9/2025 Morning Yes Marisabel Prieto PA-C Yes    polyethylene glycol (GOLYTELY) 236 g suspension Two days before procedure at 5PM fill first container with water. Mix and drink an 8 oz glass every 15 minutes until HALF of the container is gone. Place the remainder in the refrigerator. One day before procedure at 5PM drink second half of bowel prep. Drink an 8 oz glass every 15 minutes until it is gone. Day of procedure 6 hours before arrival time fill the 2nd container with water. Mix and drink an 8 oz glass every 15 minutes until HALF of the container is gone.  Discard the remaining solution. Unknown Yes Nas Lal MD No    rosuvastatin (CRESTOR) 20 MG tablet Take 1 tablet (20 mg) by mouth daily. 5/9/2025 Morning Yes Arvin Sheridan MD Yes    senna-docusate (SENOKOT-S/PERICOLACE) 8.6-50 MG tablet Take 1 tablet by mouth 2 times daily as needed (hold for loose stools) Unknown Yes Arvin Sheridan MD     sirolimus (GENERIC EQUIVALENT) 0.5 MG tablet Take 6 tablets (3 mg) by mouth daily 5/8/2025 Evening Yes Arvin Sheridan MD     sulfamethoxazole-trimethoprim (BACTRIM) 400-80 MG tablet Take 1 tablet by mouth three times a week. 5/9/2025 Morning Yes Arvin Sheridan MD No    tamsulosin (FLOMAX) 0.4 MG capsule Take 1 capsule (0.4 mg) by mouth every morning. 5/9/2025 Morning Yes Arvin Sheridan MD        Current Meds  Current Facility-Administered Medications   Medication Dose Route Frequency Provider Last Rate Last Admin    acetaminophen (TYLENOL) tablet 975 mg  975 mg Oral TID Olga Denney PA   975 mg at 05/10/25 1421    aspirin (ASA) chewable tablet 81 mg  81 mg Oral Daily Olga Denney PA   81 mg at 05/10/25 0944    bevacizumab (AVASTIN) intravitreal inj 1.25 mg  1.25 mg Intravitreal Q28 Days Triny Bunch MD   1.25 mg at 05/07/25 1226    calcium carbonate-vitamin D (CALTRATE) 600-10 MG-MCG per tablet 1 tablet  1 tablet Oral BID w/meals Olga Denney PA   1 tablet at 05/10/25 0944    ferrous sulfate (FEROSUL) tablet 325 mg  325 mg Oral BID w/meals Olga Denney PA   325 mg at 05/10/25 0944    insulin aspart (NovoLOG) injection (RAPID ACTING)  1-7 Units Subcutaneous TID AC Olga Denney PA   1 Units at 05/10/25 1359    insulin aspart (NovoLOG) injection (RAPID ACTING)  1-5 Units Subcutaneous At Bedtime Olga Denney PA        latanoprost (XALATAN) 0.005 % ophthalmic solution 1 drop  1 drop Both Eyes QPM Olga Denney PA   1 drop at 05/09/25 1719    [Held by provider] lisinopril (ZESTRIL)  tablet 5 mg  5 mg Oral Daily Olga Denney PA   5 mg at 05/10/25 0944    magnesium oxide (MAG-OX) tablet 400 mg  400 mg Oral BID Olga Denney PA   400 mg at 05/10/25 1238    mycophenolate (GENERIC EQUIVALENT) capsule 750 mg  750 mg Oral BID IS Castro Roberts MD        rosuvastatin (CRESTOR) tablet 10 mg  10 mg Oral Daily Olga Denney PA   10 mg at 05/10/25 0944    sirolimus (GENERIC EQUIVALENT) tablet 3 mg  3 mg Oral Daily Olga Denney PA   3 mg at 05/10/25 1034    sodium chloride (PF) 0.9% PF flush 3 mL  3 mL Intracatheter Q8H MUSA Olga Denney PA   3 mL at 05/10/25 1422    [Held by provider] sulfamethoxazole-trimethoprim (BACTRIM) 400-80 MG per tablet 1 tablet  1 tablet Oral Once per day on Monday Wednesday Friday Olga Denney PA        tamsulosin (FLOMAX) capsule 0.4 mg  0.4 mg Oral QAM Olga Denney PA   0.4 mg at 05/10/25 0944     Infusion Meds  Current Facility-Administered Medications   Medication Dose Route Frequency Provider Last Rate Last Admin    sodium bicarbonate 150 mEq in sterile water (preservative free) 1,000 mL infusion   Intravenous Continuous Justice Paiz MD           ALLERGIES:    Allergies   Allergen Reactions    Heparin Heparin Induced Thrombocytopenia     HIT screen sent 7/18/2020. CORRINE confirmed positive       SOCIAL HISTORY:   Social History     Socioeconomic History    Marital status:      Spouse name: Not on file    Number of children: 4    Years of education: Not on file    Highest education level: Not on file   Occupational History    Occupation:      Employer: [employer]     Employer: RETIRED   Tobacco Use    Smoking status: Never     Passive exposure: Never    Smokeless tobacco: Never    Tobacco comments:     Never smoked; non-smoking household   Vaping Use    Vaping status: Never Used   Substance and Sexual Activity    Alcohol use: No     Alcohol/week: 0.0 standard drinks of alcohol    Drug use: No     Sexual activity: Yes     Partners: Female   Other Topics Concern    Parent/sibling w/ CABG, MI or angioplasty before 65F 55M? No   Social History Narrative    Not on file     Social Drivers of Health     Financial Resource Strain: Low Risk  (12/16/2024)    Financial Resource Strain     Within the past 12 months, have you or your family members you live with been unable to get utilities (heat, electricity) when it was really needed?: No   Food Insecurity: Low Risk  (12/16/2024)    Food Insecurity     Within the past 12 months, did you worry that your food would run out before you got money to buy more?: No     Within the past 12 months, did the food you bought just not last and you didn t have money to get more?: No   Transportation Needs: Low Risk  (12/16/2024)    Transportation Needs     Within the past 12 months, has lack of transportation kept you from medical appointments, getting your medicines, non-medical meetings or appointments, work, or from getting things that you need?: No   Physical Activity: Inactive (12/16/2024)    Exercise Vital Sign     Days of Exercise per Week: 0 days     Minutes of Exercise per Session: 0 min   Stress: Stress Concern Present (12/16/2024)    Citizen of [place] [employer] - Occupational Stress Questionnaire     Feeling of Stress : Rather much   Social Connections: Unknown (12/16/2024)    Social Connection and Isolation Panel [NHANES]     Frequency of Communication with Friends and Family: Not on file     Frequency of Social Gatherings with Friends and Family: More than three times a week     Attends [employer]ism Services: Not on file     Active Member of Clubs or Organizations: Not on file     Attends Club or Organization Meetings: Not on file     Marital Status: Not on file   Interpersonal Safety: Low Risk  (10/30/2024)    Interpersonal Safety     Do you feel physically and emotionally safe where you currently live?: Yes     Within the past 12 months, have you been hit,  slapped, kicked or otherwise physically hurt by someone?: No     Within the past 12 months, have you been humiliated or emotionally abused in other ways by your partner or ex-partner?: No   Housing Stability: Low Risk  (2024)    Housing Stability     Do you have housing? : Yes     Are you worried about losing your housing?: No       FAMILY MEDICAL HISTORY:   Family History   Problem Relation Age of Onset    Diabetes Sister     Diabetes Sister     Diabetes Brother     Macular Degeneration No family hx of     Glaucoma No family hx of     Myocardial Infarction No family hx of     Kidney Disease No family hx of     Anesthesia Reaction No family hx of     Bleeding Disorder No family hx of     Venous thrombosis No family hx of        OBJECTIVE     PHYSICAL EXAM:   Temp  Av.9  F (36.6  C)  Min: 97.7  F (36.5  C)  Max: 98.1  F (36.7  C)      Pulse  Av.6  Min: 88  Max: 106 Resp  Av.7  Min: 16  Max: 18  SpO2  Av %  Min: 97 %  Max: 100 %       /57 (BP Location: Right arm, Patient Position: Sitting, Cuff Size: Adult Regular)   Pulse 88   Temp 97.8  F (36.6  C) (Oral)   Resp 16   SpO2 98%    Date 05/10/25 07 - 25 0659   Shift 0895-6083 5648-6450 7901-4741 24 Hour Total   INTAKE   P.O. 800   800   Shift Total 800   800   OUTPUT   Shift Total       Weight (kg)          Admit       General: lying in bed   Cardiac: rrr  Pulmonary: unlabored  Extremities: no LE edema       LABS:   CMP  Recent Labs   Lab 05/10/25  1420 05/10/25  1202 05/10/25  0931 05/10/25  0736 25  2314 25  2147 25  1625   NA  --   --  129*  --   --   --  128*   POTASSIUM  --   --  2.8*  --  3.3*  --  2.6*   CHLORIDE  --   --  99  --   --   --  92*   CO2  --   --  11*  --   --   --  13*   ANIONGAP  --   --  19*  --   --   --  23*   * 222* 203* 113*  --    < > 194*   BUN  --   --  98.6*  --   --   --  93.8*   CR  --   --  4.50*  --   --   --  4.88*   GFRESTIMATED  --   --  13*  --   --   --  12*    BRYANNA  --   --  8.5*  --   --   --  8.6*   MAG  --   --   --   --   --   --  1.7   PROTTOTAL  --   --   --   --   --   --  7.7   ALBUMIN  --   --   --   --   --   --  4.6   BILITOTAL  --   --   --   --   --   --  0.3   ALKPHOS  --   --   --   --   --   --  174*   AST  --   --   --   --   --   --  13   ALT  --   --   --   --   --   --  9    < > = values in this interval not displayed.     CBC  Recent Labs   Lab 05/10/25  0931 05/09/25  1625   HGB 10.1* 11.0*   WBC 9.2 11.5*   RBC 3.73* 4.10*   HCT 29.5* 32.9*   MCV 79 80   MCH 27.1 26.8   MCHC 34.2 33.4   RDW 15.9* 16.0*    323     INRNo lab results found in last 7 days.  ABGNo lab results found in last 7 days.   URINE STUDIES  Recent Labs   Lab Test 05/10/25  0256 12/05/24  1306 10/12/23  0258 07/28/23  1226   COLOR Dark Yellow* Light Yellow Light Yellow Light Yellow   APPEARANCE Clear Clear Clear Clear   URINEGLC 300* >=1000* Negative 100*   URINEBILI Negative Negative Negative Negative   URINEKETONE Negative Negative Negative Negative   SG 1.016 1.017 1.015 1.014   UBLD Negative Negative Negative Negative   URINEPH 5.0 6.5 5.0 5.5   PROTEIN 10* 50* 20* Negative   NITRITE Negative Negative Negative Negative   LEUKEST Negative Negative Negative Negative   RBCU 1 2 0 <1   WBCU 3 1 3 <1     Recent Labs   Lab Test 03/14/22  1408 10/03/18  0725   UTPG 0.29* 0.06     PTH  Recent Labs   Lab Test 01/15/24  1511 05/03/23  0844 08/16/18  0834   PTHI 70* 40 180*     IRON STUDIES  Recent Labs   Lab Test 12/05/24  1159 07/22/24  0711 04/29/24  0736 04/01/24  1125 01/10/24  1300 12/11/23  1013 11/16/23  1022 11/08/23  0908 10/10/23  1011 07/28/23  1205 07/17/23  0908 09/22/20  1256 08/24/20  2026 07/08/19  1021   IRON 44* 52* 52* 69 63 49* 55* 66 90 80 56* 66 88 49    200* 203* 228* 213* 209* 205* 213* 238* 227* 229* 272 249 247   IRONSAT 18 26 26 30 30 23 27 31 38 35 24 24 35 20   SEVERIANO 314 278 284 398 277 304 334  --  236 190 123 447* 388 156     Justice Paiz,  MD

## 2025-05-10 NOTE — PROGRESS NOTES
[facility]    Medicine Progress Note - Hospitalist Service, GOLD TEAM 6    Date of Admission:  5/9/2025    Assessment & Plan   Lucian Oliveira is a 69 year old male with history of ischemic cardiomyopathy s/p OHT (2020), HTN, HLD, pulmonary HTN, CHANTEL, DM2, and CKD who was admitted to [facility] with DAYA in setting of acute diarrhea of unclear etiology.       Acute diarrhea:  Onset of symptoms 5/2. No associated fevers, chills, N/V, or abdominal pain. Negative exposure history. Evaluated by PCP in clinic on 5/8. C.diff PCR negative. Presented to ED for further evaluation due to ongoing symptoms. Afebrile. VSS. WBC 11.5. CT A/P with fluid-filled colon which is non-specific but could represent enterocolitis. Enteric panel negative 5/10. Although infectious etiology is certainly a possibility in this immunocompromised individual, need to consider non-infectious etiology given benign exam. DDx also includes side effects of medications, specifically MMF.   - Transplant ID consulted for further infectious work up  - MMF decreased by cardiology (primarily in case of infection)  - Monitor stool ouput  - Start MIVF with LR at 100 ml/h for hypovolemia  - Hold imodium until cleared by ID  - CBC, BMP in AM    DAYA on CKD stage 3b:  Cr 4.88 on arrival, baseline 1.8-2.2. UA negative. Hyaline casts on urine micro. Urine Na < 20. No evidence of hydro on recent CT. Suspect prerenal azotemia due to hypovolemia. Slight imrpovement w fluid bolus in ED. Repeat Cr 4.5 today.   - Nephrology consulted  - Start LR at 100 ml/h   - Avoid nephrotoxins  - Hold PTA lasix, jardiance, lisinopril and bactrim  - Repeat BMP in AM    Hyponatremia:  Na 128 on arrival. Urine Na < 20. Suspect volume depletion in setting of diarrhea. Slight improvement following LR bolus. Repeat Na 129. Other possibilities include SIADH (less likely).   - MIVF as above  - BMP in AM    Hypokalemia:  Likely secondary to diarrhea. K  2.6 on arrival. Replaced in ED. Repeat K 3.3 but back down to 2.8 today.   - RN replacement protocol  - BMP in AM    Anion gap metabolic acidosis:  suspect starvation ketosis. Could have mixed acid/base picture with diarrhea.   - Repeat BMP in AM    Hx ICM s/p OHT (2020):  No acute concerns. ? MMF contributing to diarrhea, previously well tolerated.   - Transplant Cardiology consulted  - Check Sirolimus levels  - Sirolimus 3mg daily  - MMF decreased to 750mg BID (50% home dose)  - Hold Bactrim prophylaxis d/t DAYA    Type II NSTEMI, demand ischemia:  Trop mildly elevated but down-trending. Suspect demand ischemia in setting of severe dehydration and DAYA. No chest pain. EKG negative.  - Cardiology following    Chronic Normocytic Anemia:  Hgb at baseline. No evidence of bleeding.    DM Type II:  on tresiba and jardiance at home.  - holding jardiance d/t DAYA  - Cover with LSSI  - Hypoglyemia protocol    BPH:    - Continue PTA flomax             Diet: Combination Diet Regular Diet Adult    DVT Prophylaxis: Pneumatic Compression Devices  Calderon Catheter: Not present  Lines: None     Cardiac Monitoring: None  Code Status: Full Code      Clinically Significant Risk Factors Present on Admission        # Hypokalemia: Lowest K = 2.6 mmol/L in last 2 days, will replace as needed  # Hyponatremia: Lowest Na = 128 mmol/L in last 2 days, will monitor as appropriate  # Hypochloremia: Lowest Cl = 92 mmol/L in last 2 days, will monitor as appropriate  # Hypocalcemia: Lowest Ca = 8.6 mg/dL in last 2 days, will monitor and replace as appropriate    # Anion Gap Metabolic Acidosis: Highest Anion Gap = 23 mmol/L in last 2 days, will monitor and treat as appropriate    # Drug Induced Platelet Defect: home medication list includes an antiplatelet medication   # Hypertension: Noted on problem list  # Chronic heart failure with preserved ejection fraction: heart failure noted on problem list and last echo with EF >50%         # DMII: A1C = 7.1  "% (Ref range: <=5.7 %) within past 6 months   # Overweight: Estimated body mass index is 25.18 kg/m  as calculated from the following:    Height as of an earlier encounter on 5/9/25: 1.676 m (5' 6\").    Weight as of an earlier encounter on 5/9/25: 70.8 kg (156 lb).              Social Drivers of Health    Physical Activity: Inactive (12/16/2024)    Exercise Vital Sign     Days of Exercise per Week: 0 days     Minutes of Exercise per Session: 0 min   Stress: Stress Concern Present (12/16/2024)    [place] [employer] - Occupational Stress Questionnaire     Feeling of Stress : Rather much   Social Connections: Unknown (12/16/2024)    Social Connection and Isolation Panel [NHANES]     Frequency of Social Gatherings with Friends and Family: More than three times a week          Disposition Plan     Medically Ready for Discharge: Anticipated in 2-4 Days           The patient's care was discussed with the Attending Physician, Dr. Kennedy and Patient.    Cayetano Corona PA-C  Hospitalist Service, [address]  Securely message with [employer] (more info)  Text page via [facility] Paging/Directory   See signed in provider for up to date coverage information  ______________________________________________________________________    Interval History   History obtained via phone .    Continues to have watery diarrhea, already 3 bouts this morning. Denies any abdominal pain, tenderness, nausea or vomiting. No melena or hematochezia. No fevers or chills. Otherwise no other complaints. Denies any dizziness but does report mild fatigue. No dyspnea, chest pain, LE edema.     Physical Exam   Vital Signs: Temp: 98.1  F (36.7  C) Temp src: Oral BP: 90/56 Pulse: 103   Resp: 18 SpO2: 99 % O2 Device: None (Room air)    Weight: 0 lbs 0 oz    General Appearance:  Awake. Alert. Oriented x3. NAD.   HEENT:  Unremarkable.   Respiratory:  Normal effort. CTAB. No " wheezing, rhonchi, rales.   Cardiovascular:  RRR. S1/S2. No murmurs.   GI:  Soft but slightly distended, non-tender, frequent high pitched bowel sounds.  Extremity:  No pitting edema.   Skin:  No visible rash.   Neuro:  Grossly non-focal.      Medical Decision Making       50 MINUTES SPENT BY ME on the date of service doing chart review, history, exam, documentation & further activities per the note.      Data     I have personally reviewed the following data over the past 24 hrs:    9.2  \   10.1 (L)   / 272     129 (L) 99 98.6 (H) /  180 (H)   2.8 (L) 11 (L) 4.50 (H) \     ALT: 9 AST: 13 AP: 174 (H) TBILI: 0.3   ALB: 4.6 TOT PROTEIN: 7.7 LIPASE: N/A     Trop: 58 (H) BNP: 1,495 (H)       Imaging results reviewed over the past 24 hrs:   Recent Results (from the past 24 hours)   CT Abdomen Pelvis w/o Contrast    Narrative    EXAMINATION: CT ABDOMEN PELVIS W/O CONTRAST, 5/9/2025 4:43 PM    INDICATION: Abd pain, weakness.  GFR < 30    COMPARISON STUDY: 10/11/2023, 7/9/2019    TECHNIQUE: CT scan of the abdomen and pelvis was performed on  multidetector CT scanner using volumetric acquisition technique and  images were reconstructed in multiple planes with variable thickness  and reviewed on dedicated workstations.     CONTRAST: None injected IV without oral contrast    CT scan radiation dose is optimized to minimum requisite dose using  automated dose modulation techniques.    FINDINGS:    Lower thorax: Small bilateral calcified granulomas.    Liver: No mass. No intrahepatic biliary ductal dilation.    Biliary System: Cholelithiasis without cholecystitis.    Pancreas: No mass or pancreatic ductal dilation.    Adrenal glands: No mass or nodules    Spleen: Normal.    Kidneys: No suspicious mass, obstructing calculus or hydronephrosis.    Gastrointestinal tract: Normal appendix. Normal caliber small bowel.   Numerous duodenal and jejunal diverticula without inflammation.    Mesentery/peritoneum/retroperitoneum: No mass. No  free fluid or air.    Lymph nodes: No significant lymphadenopathy.    Pelvis: Urinary bladder is normal.    Osseous structures: No aggressive or acute osseous lesion.  In the  proximal right femur (3/65) there is a benign-appearing osseous lesion  which is unchanged since 7/19/2019 and is not likely to be clinically  significant.      Soft tissues: Within normal limits.  Bilateral chronic mastoid.      Impression    IMPRESSION:   1. Fluid-filled colon which is nonspecific but can be seen with  enterocolitis. No additional acute findings in the abdomen or pelvis.  Normal appendix.  2. Cholelithiasis without evidence of cholecystitis.  3. Duodenal and jejunal diverticula without evidence of  diverticulitis.    I have personally reviewed the examination and initial interpretation  and I agree with the findings.    NIKOLE DAVILA MD         SYSTEM ID:  [id number]

## 2025-05-10 NOTE — MEDICATION SCRIBE - ADMISSION MEDICATION HISTORY
Medication Scribe Admission Medication History    Admission medication history is complete. The information provided in this note is only as accurate as the sources available at the time of the update.    Information Source(s): Patient and Family member via in-person and phone    Pertinent Information: Lucian Parrish's wife helps him with his medications. She stated that patient has not been feeling well and has low blood sugar and low blood pressure. States he has not taken his humalog as it made his blood sugar worse, states he did take tresiba but it also lowered his blood sugar. States that patients blood pressure has also been very low and has not taken his lisinopril. States that patient has not been taking 25mg of jardiance, has continued on the 10mg dose although provider's orders state differently. Wife is concerned with his low blood sugar and pressure lately. She states he has not taken loperamide for diarrhea as this has had no effect on him. I have left a message in the [facility] signout notes as well.     Changes made to PTA medication list:  Added: None  Deleted: None  Changed: None    Allergies reviewed with patient and updates made in EHR: yes    Medication History Completed By: Doretha Mccord 5/10/2025 9:10 AM    Current Facility-Administered Medications for the 5/9/25 encounter (Hospital Encounter)   Medication    bevacizumab (AVASTIN) intravitreal inj 1.25 mg     PTA Med List   Medication Sig Note Last Dose/Taking    acetaminophen (TYLENOL) 325 MG tablet Take 3 tablets (975 mg) by mouth every 8 hours as needed for mild pain  Past Week    Alcohol Swabs PADS Use to swab the area of the injection or sergio as directed   Per insurance coverage  Taking    aspirin (ASA) 81 MG chewable tablet Take 1 tablet (81 mg) by mouth daily  5/9/2025 Morning    bisacodyl (DULCOLAX) 5 MG EC tablet Two days prior to exam take two (2) tablets at 4pm. One day prior to exam take two (2) tablets at 4pm  Taking    blood glucose  (NO BRAND SPECIFIED) test strip Use to test blood sugar 4 times daily or as directed.  Taking    blood glucose monitoring (ACCU-CHEK FELIPE SMARTVIEW) meter device kit Use to test blood sugar 3-4 times daily, as directed.  Taking    blood glucose monitoring (SOFTCLIX) lancets 1 each 4 times daily Use to test blood sugars 2 times daily.  Taking    calcium carbonate-vitamin D (CALTRATE) 600-10 MG-MCG per tablet TAKE ONE TABLET BY MOUTH TWICE A DAY WITH MEALS  5/9/2025 Morning    Continuous Glucose Sensor (DEXCOM G7 SENSOR) [facility] CAMBIE CADA 10 MCINTYRE  Taking    empagliflozin (JARDIANCE) 25 MG TABS tablet Take 1 tablet (25 mg) by mouth daily. 5/10/2025: Pt wife states patient is taking 10mg dose  5/9/2025 Morning    ferrous sulfate (FEROSUL) 325 (65 Fe) MG tablet Take 1 tablet (325 mg) by mouth 2 times daily (with meals)  5/9/2025 Morning    furosemide (LASIX) 20 MG tablet Take 1 tablet (20 mg) by mouth daily.  5/9/2025 Morning    HUMALOG KWIKPEN 100 UNIT/ML soln Give 10 units with breakfast, 10 units with lunch,  and 5 units with dinner.  Average daily use is 25 units 5/10/2025: Pt wife states his blood sugar has been very low  Past Week    Injection Device for insulin (CEQUR SIMPLICITY 2U) KALI 1 each See Admin Instructions. Change patch every 2-3 days  Taking    Injection Device for Insulin (CEQUR SIMPLICITY ) MISC 1 each See Admin Instructions. Use with patches. Change patch every 2-3 days  Taking    insulin degludec (TRESIBA) 200 UNIT/ML pen Inject 60 Units Subcutaneous every morning Order pen needles too  5/9/2025 Morning    insulin pen needle (32G X 4 MM) 32G X 4 MM miscellaneous Use 5-6 pen needles daily or as directed.  Taking    ketorolac (ACULAR) 0.5 % ophthalmic solution Place 1 drop into the right eye 2 times daily  5/9/2025 Morning    latanoprost (XALATAN) 0.005 % ophthalmic solution Place 1 drop into both eyes daily.  5/9/2025 Morning    lisinopril (ZESTRIL) 5 MG tablet Take 1 tablet (5 mg) by mouth  daily. 5/10/2025: Pt wife states he has not taken since Tuesday as he has had low blood pressure  5/6/2025    loperamide (IMODIUM A-D) 2 MG tablet Take 1 tablet (2 mg) by mouth 4 times daily as needed for diarrhea.  Unknown    magnesium oxide 400 MG tablet Take 1 tablet (400 mg) by mouth 2 times daily  5/9/2025 Morning    mycophenolate (GENERIC EQUIVALENT) 500 MG tablet Take 3 tablets (1,500 mg) by mouth 2 times daily  5/9/2025 Morning    omega-3 acid ethyl esters (LOVAZA) 1 g capsule TOME 1 CAPSULA POR LA BOCA CADA LISA  5/9/2025 Morning    polyethylene glycol (GOLYTELY) 236 g suspension Two days before procedure at 5PM fill first container with water. Mix and drink an 8 oz glass every 15 minutes until HALF of the container is gone. Place the remainder in the refrigerator. One day before procedure at 5PM drink second half of bowel prep. Drink an 8 oz glass every 15 minutes until it is gone. Day of procedure 6 hours before arrival time fill the 2nd container with water. Mix and drink an 8 oz glass every 15 minutes until HALF of the container is gone. Discard the remaining solution.  Unknown    rosuvastatin (CRESTOR) 20 MG tablet Take 1 tablet (20 mg) by mouth daily.  5/9/2025 Morning    senna-docusate (SENOKOT-S/PERICOLACE) 8.6-50 MG tablet Take 1 tablet by mouth 2 times daily as needed (hold for loose stools)  Unknown    sirolimus (GENERIC EQUIVALENT) 0.5 MG tablet Take 6 tablets (3 mg) by mouth daily  5/8/2025 Evening    sulfamethoxazole-trimethoprim (BACTRIM) 400-80 MG tablet Take 1 tablet by mouth three times a week. 5/10/2025: Takes this medication Monday, wedesday, friday 5/9/2025 Morning    tamsulosin (FLOMAX) 0.4 MG capsule Take 1 capsule (0.4 mg) by mouth every morning.  5/9/2025 Morning

## 2025-05-11 ENCOUNTER — APPOINTMENT (OUTPATIENT)
Dept: PHYSICAL THERAPY | Facility: CLINIC | Age: 70
End: 2025-05-11
Attending: PHYSICIAN ASSISTANT
Payer: COMMERCIAL

## 2025-05-11 ENCOUNTER — APPOINTMENT (OUTPATIENT)
Dept: INTERPRETER SERVICES | Facility: CLINIC | Age: 70
End: 2025-05-11
Payer: COMMERCIAL

## 2025-05-11 LAB
ANION GAP SERPL CALCULATED.3IONS-SCNC: 17 MMOL/L (ref 7–15)
BUN SERPL-MCNC: 105 MG/DL (ref 8–23)
CALCIUM SERPL-MCNC: 8.4 MG/DL (ref 8.8–10.4)
CHLORIDE SERPL-SCNC: 98 MMOL/L (ref 98–107)
CMV DNA SPEC NAA+PROBE-ACNC: NOT DETECTED IU/ML
CREAT SERPL-MCNC: 4 MG/DL (ref 0.67–1.17)
EBV DNA SERPL NAA+PROBE-ACNC: NOT DETECTED IU/ML
EGFRCR SERPLBLD CKD-EPI 2021: 15 ML/MIN/1.73M2
ERYTHROCYTE [DISTWIDTH] IN BLOOD BY AUTOMATED COUNT: 15.8 % (ref 10–15)
GLUCOSE BLDC GLUCOMTR-MCNC: 154 MG/DL (ref 70–99)
GLUCOSE BLDC GLUCOMTR-MCNC: 164 MG/DL (ref 70–99)
GLUCOSE BLDC GLUCOMTR-MCNC: 168 MG/DL (ref 70–99)
GLUCOSE BLDC GLUCOMTR-MCNC: 177 MG/DL (ref 70–99)
GLUCOSE BLDC GLUCOMTR-MCNC: 190 MG/DL (ref 70–99)
GLUCOSE SERPL-MCNC: 144 MG/DL (ref 70–99)
HCO3 SERPL-SCNC: 16 MMOL/L (ref 22–29)
HCT VFR BLD AUTO: 28.8 % (ref 40–53)
HGB BLD-MCNC: 10 G/DL (ref 13.3–17.7)
MCH RBC QN AUTO: 27.4 PG (ref 26.5–33)
MCHC RBC AUTO-ENTMCNC: 34.7 G/DL (ref 31.5–36.5)
MCV RBC AUTO: 79 FL (ref 78–100)
PLATELET # BLD AUTO: 249 10E3/UL (ref 150–450)
POTASSIUM SERPL-SCNC: 3 MMOL/L (ref 3.4–5.3)
POTASSIUM SERPL-SCNC: 3.1 MMOL/L (ref 3.4–5.3)
POTASSIUM SERPL-SCNC: 3.2 MMOL/L (ref 3.4–5.3)
POTASSIUM SERPL-SCNC: 3.4 MMOL/L (ref 3.4–5.3)
RBC # BLD AUTO: 3.65 10E6/UL (ref 4.4–5.9)
SIROLIMUS BLD-MCNC: 8.6 UG/L (ref 5–15)
SODIUM SERPL-SCNC: 131 MMOL/L (ref 135–145)
SPECIMEN TYPE: NORMAL
TME LAST DOSE: NORMAL H
TME LAST DOSE: NORMAL H
WBC # BLD AUTO: 8 10E3/UL (ref 4–11)

## 2025-05-11 PROCEDURE — 999N000111 HC STATISTIC OT IP EVAL DEFER

## 2025-05-11 PROCEDURE — 250N000012 HC RX MED GY IP 250 OP 636 PS 637

## 2025-05-11 PROCEDURE — 99232 SBSQ HOSP IP/OBS MODERATE 35: CPT | Mod: GC | Performed by: INTERNAL MEDICINE

## 2025-05-11 PROCEDURE — 99233 SBSQ HOSP IP/OBS HIGH 50: CPT | Mod: FS | Performed by: STUDENT IN AN ORGANIZED HEALTH CARE EDUCATION/TRAINING PROGRAM

## 2025-05-11 PROCEDURE — 250N000009 HC RX 250: Performed by: STUDENT IN AN ORGANIZED HEALTH CARE EDUCATION/TRAINING PROGRAM

## 2025-05-11 PROCEDURE — 80195 ASSAY OF SIROLIMUS: CPT

## 2025-05-11 PROCEDURE — 250N000012 HC RX MED GY IP 250 OP 636 PS 637: Performed by: PHYSICIAN ASSISTANT

## 2025-05-11 PROCEDURE — 84132 ASSAY OF SERUM POTASSIUM: CPT | Performed by: PHYSICIAN ASSISTANT

## 2025-05-11 PROCEDURE — 84132 ASSAY OF SERUM POTASSIUM: CPT | Performed by: TRANSPLANT SURGERY

## 2025-05-11 PROCEDURE — 250N000013 HC RX MED GY IP 250 OP 250 PS 637: Performed by: PHYSICIAN ASSISTANT

## 2025-05-11 PROCEDURE — 250N000013 HC RX MED GY IP 250 OP 250 PS 637: Performed by: STUDENT IN AN ORGANIZED HEALTH CARE EDUCATION/TRAINING PROGRAM

## 2025-05-11 PROCEDURE — 97161 PT EVAL LOW COMPLEX 20 MIN: CPT | Mod: GP

## 2025-05-11 PROCEDURE — 99233 SBSQ HOSP IP/OBS HIGH 50: CPT | Mod: GC | Performed by: INTERNAL MEDICINE

## 2025-05-11 PROCEDURE — 85014 HEMATOCRIT: CPT | Performed by: PHYSICIAN ASSISTANT

## 2025-05-11 PROCEDURE — 97530 THERAPEUTIC ACTIVITIES: CPT | Mod: GP

## 2025-05-11 PROCEDURE — 84132 ASSAY OF SERUM POTASSIUM: CPT | Performed by: STUDENT IN AN ORGANIZED HEALTH CARE EDUCATION/TRAINING PROGRAM

## 2025-05-11 PROCEDURE — 36415 COLL VENOUS BLD VENIPUNCTURE: CPT

## 2025-05-11 PROCEDURE — 36415 COLL VENOUS BLD VENIPUNCTURE: CPT | Performed by: TRANSPLANT SURGERY

## 2025-05-11 PROCEDURE — 120N000011 HC R&B TRANSPLANT UMMC

## 2025-05-11 PROCEDURE — 36415 COLL VENOUS BLD VENIPUNCTURE: CPT | Performed by: STUDENT IN AN ORGANIZED HEALTH CARE EDUCATION/TRAINING PROGRAM

## 2025-05-11 PROCEDURE — 250N000013 HC RX MED GY IP 250 OP 250 PS 637

## 2025-05-11 PROCEDURE — 250N000011 HC RX IP 250 OP 636

## 2025-05-11 RX ORDER — POTASSIUM CHLORIDE 750 MG/1
20 TABLET, EXTENDED RELEASE ORAL ONCE
Status: COMPLETED | OUTPATIENT
Start: 2025-05-11 | End: 2025-05-11

## 2025-05-11 RX ORDER — ONDANSETRON 2 MG/ML
4 INJECTION INTRAMUSCULAR; INTRAVENOUS EVERY 6 HOURS PRN
Status: DISCONTINUED | OUTPATIENT
Start: 2025-05-11 | End: 2025-05-17 | Stop reason: HOSPADM

## 2025-05-11 RX ORDER — PROCHLORPERAZINE MALEATE 5 MG/1
5 TABLET ORAL EVERY 6 HOURS PRN
Status: DISCONTINUED | OUTPATIENT
Start: 2025-05-11 | End: 2025-05-17 | Stop reason: HOSPADM

## 2025-05-11 RX ORDER — ONDANSETRON 4 MG/1
4 TABLET, ORALLY DISINTEGRATING ORAL EVERY 6 HOURS PRN
Status: DISCONTINUED | OUTPATIENT
Start: 2025-05-11 | End: 2025-05-17 | Stop reason: HOSPADM

## 2025-05-11 RX ORDER — PROCHLORPERAZINE 25 MG
12.5 SUPPOSITORY, RECTAL RECTAL EVERY 12 HOURS PRN
Status: DISCONTINUED | OUTPATIENT
Start: 2025-05-11 | End: 2025-05-17 | Stop reason: HOSPADM

## 2025-05-11 RX ORDER — LOPERAMIDE HYDROCHLORIDE 2 MG/1
2 CAPSULE ORAL 4 TIMES DAILY PRN
Status: DISCONTINUED | OUTPATIENT
Start: 2025-05-11 | End: 2025-05-12

## 2025-05-11 RX ORDER — POTASSIUM CHLORIDE 750 MG/1
40 TABLET, EXTENDED RELEASE ORAL ONCE
Status: COMPLETED | OUTPATIENT
Start: 2025-05-11 | End: 2025-05-11

## 2025-05-11 RX ADMIN — INSULIN ASPART 1 UNITS: 100 INJECTION, SOLUTION INTRAVENOUS; SUBCUTANEOUS at 08:25

## 2025-05-11 RX ADMIN — MYCOPHENOLATE MOFETIL 750 MG: 250 CAPSULE ORAL at 18:45

## 2025-05-11 RX ADMIN — TRAMADOL HYDROCHLORIDE 25 MG: 50 TABLET, FILM COATED ORAL at 20:29

## 2025-05-11 RX ADMIN — POTASSIUM CHLORIDE 20 MEQ: 750 TABLET, EXTENDED RELEASE ORAL at 22:14

## 2025-05-11 RX ADMIN — ROSUVASTATIN CALCIUM 10 MG: 10 TABLET, FILM COATED ORAL at 08:19

## 2025-05-11 RX ADMIN — INSULIN ASPART 1 UNITS: 100 INJECTION, SOLUTION INTRAVENOUS; SUBCUTANEOUS at 18:47

## 2025-05-11 RX ADMIN — MAGNESIUM OXIDE TAB 400 MG (241.3 MG ELEMENTAL MG) 400 MG: 400 (241.3 MG) TAB at 12:37

## 2025-05-11 RX ADMIN — CALCIUM CARBONATE 600 MG (1,500 MG)-VITAMIN D3 400 UNIT TABLET 1 TABLET: at 08:19

## 2025-05-11 RX ADMIN — ASPIRIN 81 MG CHEWABLE TABLET 81 MG: 81 TABLET CHEWABLE at 08:20

## 2025-05-11 RX ADMIN — MYCOPHENOLATE MOFETIL 750 MG: 250 CAPSULE ORAL at 08:19

## 2025-05-11 RX ADMIN — SODIUM BICARBONATE: 84 INJECTION, SOLUTION INTRAVENOUS at 15:57

## 2025-05-11 RX ADMIN — CALCIUM CARBONATE 600 MG (1,500 MG)-VITAMIN D3 400 UNIT TABLET 1 TABLET: at 18:45

## 2025-05-11 RX ADMIN — POTASSIUM CHLORIDE 20 MEQ: 750 TABLET, EXTENDED RELEASE ORAL at 15:59

## 2025-05-11 RX ADMIN — FERROUS SULFATE TAB 325 MG (65 MG ELEMENTAL FE) 325 MG: 325 (65 FE) TAB at 08:19

## 2025-05-11 RX ADMIN — MAGNESIUM OXIDE TAB 400 MG (241.3 MG ELEMENTAL MG) 400 MG: 400 (241.3 MG) TAB at 20:29

## 2025-05-11 RX ADMIN — INSULIN ASPART 1 UNITS: 100 INJECTION, SOLUTION INTRAVENOUS; SUBCUTANEOUS at 12:37

## 2025-05-11 RX ADMIN — SODIUM BICARBONATE: 84 INJECTION, SOLUTION INTRAVENOUS at 04:07

## 2025-05-11 RX ADMIN — ACETAMINOPHEN 975 MG: 325 TABLET ORAL at 20:29

## 2025-05-11 RX ADMIN — POTASSIUM CHLORIDE 40 MEQ: 750 TABLET, EXTENDED RELEASE ORAL at 08:19

## 2025-05-11 RX ADMIN — SIROLIMUS 3 MG: 2 TABLET ORAL at 12:39

## 2025-05-11 RX ADMIN — TAMSULOSIN HYDROCHLORIDE 0.4 MG: 0.4 CAPSULE ORAL at 08:19

## 2025-05-11 RX ADMIN — LOPERAMIDE HYDROCHLORIDE 2 MG: 2 CAPSULE ORAL at 18:45

## 2025-05-11 RX ADMIN — FERROUS SULFATE TAB 325 MG (65 MG ELEMENTAL FE) 325 MG: 325 (65 FE) TAB at 18:45

## 2025-05-11 RX ADMIN — ACETAMINOPHEN 975 MG: 325 TABLET ORAL at 08:19

## 2025-05-11 RX ADMIN — LATANOPROST 1 DROP: 50 SOLUTION OPHTHALMIC at 20:30

## 2025-05-11 RX ADMIN — ONDANSETRON 4 MG: 2 INJECTION, SOLUTION INTRAMUSCULAR; INTRAVENOUS at 18:43

## 2025-05-11 ASSESSMENT — ACTIVITIES OF DAILY LIVING (ADL)
ADLS_ACUITY_SCORE: 66
ADLS_ACUITY_SCORE: 66
ADLS_ACUITY_SCORE: 68
ADLS_ACUITY_SCORE: 66
ADLS_ACUITY_SCORE: 68
ADLS_ACUITY_SCORE: 66
ADLS_ACUITY_SCORE: 68
ADLS_ACUITY_SCORE: 68
ADLS_ACUITY_SCORE: 66
ADLS_ACUITY_SCORE: 66
ADLS_ACUITY_SCORE: 68
ADLS_ACUITY_SCORE: 68
ADLS_ACUITY_SCORE: 66
ADLS_ACUITY_SCORE: 68
ADLS_ACUITY_SCORE: 66
ADLS_ACUITY_SCORE: 68

## 2025-05-11 NOTE — PROGRESS NOTES
[facility]    Medicine Progress Note - Hospitalist Service, GOLD TEAM 6    Date of Admission:  5/9/2025    Assessment & Plan   Lucian Oliveira is a 69 year old male with history of ischemic cardiomyopathy s/p OHT (2020), HTN, HLD, pulmonary HTN, CHANTEL, DM2, and CKD who was admitted to [facility] with DAYA in setting of acute diarrhea of unclear etiology.     Updates for 5/11/2025:  - OK for imodium prn  - Monitor stool output on reduced dose MMF  - Continue sodium bicarb infusion at 50 ml/h (order per nephrology)       Acute diarrhea, possible MMF-induced enterocolitis  Onset of symptoms 5/2. No associated fevers, chills, N/V, or abdominal pain. Negative exposure history. C.diff PCR negative 5/9. Presented to ED for further evaluation. Afebrile. VSS. WBC 11.5. CT A/P with fluid-filled colon which is non-specific but could represent enterocolitis. Enteric panel negative 5/10. CMV and EBV PCR negative. ID consulted, currently no clear source of infection. Suspect MMF-induced enterocolitis. Cardiology reduced MMF dose on 5/10, no significant change in stool output today. Remains afebrile. Abdominal exam benign.   - Cardiology consulted, may need GI consult for colonoscopy to further evaluate if not improving with reduced dose of MMF  - Transplant ID consulted, appreciate further recommendations. Consider O&P if symptoms persist.  - Monitor stool ouput  - Continue MIVF per nephrology  - Imodium prn  - CBC, BMP in AM    DAYA on CKD stage 3b:  Cr 4.88 on arrival (baseline 1.8-2.2). UA negative. Hyaline casts on urine micro. Urine Na < 20. No evidence of hydro on recent CT. Suspect prerenal azotemia due to hypovolemia. Ongoing imrpovement w IVF, however diarrhea persists. Repeat Cr 4.0 today. . No uremic symptoms. See below re: AGMA.   - Nephrology following  - Continue sodium bicarb infusion  - Avoid nephrotoxins  - Holding PTA lasix, jardiance, lisinopril and bactrim  -  Repeat BMP in AM    Hyponatremia:  Na 128 on arrival. Urine Na < 20. Likely volume depletion in setting of diarrhea. Na 131 today following IVF.    - BMP in AM    Hypokalemia:  Likely secondary to diarrhea. K 2.6 on arrival. Replaced in ED. Now with recurrent hypokalemia in setting of persistent diarrhea and treatment of metabolic acidosis causing intracellular shift.   - Continue aggressive RN replacement protocol  - BMP in AM    Anion gap metabolic acidosis:  Possibly multifactorial with GI bicarb loss, uremia, and DAYA/CKD.  - Nephrology consulted  - Continue sodium bicarb infusion at 50 ml/h  - Repeat BMP in AM    Hx ICM s/p OHT (2020):  No acute concerns. 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   - Transplant Cardiology consulted  - Sirolimus levels therapeutic   - Sirolimus 3mg daily  - MMF decreased to 750mg BID (50% home dose)  - Holding Bactrim prophylaxis d/t DAYA    Type II NSTEMI, demand ischemia:  Trop mildly elevated but down-trending. Suspect demand ischemia in setting of severe dehydration and DAYA. No chest pain. EKG negative.  - Cardiology following    Chronic Normocytic Anemia:  Hgb at baseline. No evidence of bleeding.    DM Type II:  on tresiba and jardiance at home.  - holding jardiance d/t DAYA  - Cover with LSSI  - Hypoglyemia protocol    BPH:    - Continue PTA flomax             Diet: Combination Diet Regular Diet Adult    DVT Prophylaxis: Pneumatic Compression Devices  Calderon Catheter: Not present  Lines: None     Cardiac Monitoring: None  Code Status: Full Code      Clinically Significant Risk Factors        # Hypokalemia: Lowest K = 2.6 mmol/L in last 2 days, will replace as needed  # Hyponatremia: Lowest Na = 128 mmol/L in last 2 days, will monitor as appropriate  # Hypochloremia: Lowest Cl = 92 mmol/L in last 2 days, will monitor as appropriate  # Hypocalcemia: Lowest Ca = 8.4 mg/dL in last 2 days, will monitor and replace as appropriate    # Anion Gap Metabolic Acidosis: Highest Anion  "Gap = 23 mmol/L in last 2 days, will monitor and treat as appropriate      # Hypertension: Noted on problem list    # Chronic heart failure with preserved ejection fraction: heart failure noted on problem list and last echo with EF >50%          # DMII: A1C = 7.1 % (Ref range: <=5.7 %) within past 6 months   # Overweight: Estimated body mass index is 25.18 kg/m  as calculated from the following:    Height as of an earlier encounter on 5/9/25: 1.676 m (5' 6\").    Weight as of an earlier encounter on 5/9/25: 70.8 kg (156 lb)., PRESENT ON ADMISSION            Social Drivers of Health    Physical Activity: Inactive (12/16/2024)    Exercise Vital Sign     Days of Exercise per Week: 0 days     Minutes of Exercise per Session: 0 min   Stress: Stress Concern Present (12/16/2024)    [place] [place] [employer] - Occupational Stress Questionnaire     Feeling of Stress : Rather much   Social Connections: Unknown (12/16/2024)    Social Connection and Isolation Panel [NHANES]     Frequency of Social Gatherings with Friends and Family: More than three times a week          Disposition Plan     Medically Ready for Discharge: Anticipated in 2-4 Days           The patient's care was discussed with the Attending Physician, Dr. Kennedy and Patient.    Cayetano Corona PA-C  Hospitalist Service, GOLD TEAM [address] [place] [address]  Securely message with [employer] (more info)  Text page via [facility] Paging/Directory   See signed in provider for up to date coverage information  ______________________________________________________________________    Interval History   History obtained via phone .    Overall feeling better today, however continues to have frequent bouts of watery diarrhea, which is largely unchanged. No fevers, chills, N/V, or abdominal pain.     No new complaints.     Physical Exam   Vital Signs: Temp: 97.4  F (36.3  C) Temp src: Oral BP: 93/56 Pulse: 92   Resp: 18 " SpO2: 100 % O2 Device: None (Room air)    Weight: 0 lbs 0 oz    General Appearance:  Awake. Alert. Oriented x3. NAD.   HEENT:  Unremarkable.   Respiratory:  Normal effort. CTAB. No wheezing, rhonchi, rales.   Cardiovascular:  RRR. S1/S2. No murmurs.   GI:  Soft but slightly distended, non-tender, +NS  Extremity:  No pitting edema.   Skin:  No visible rash.   Neuro:  Grossly non-focal.      Medical Decision Making       50 MINUTES SPENT BY ME on the date of service doing chart review, history, exam, documentation & further activities per the note.      Data     I have personally reviewed the following data over the past 24 hrs:    8.0  \   10.0 (L)   / 249     131 (L) 98 105.0 (H) /  154 (H)   3.4 16 (L) 4.00 (H) \       Imaging results reviewed over the past 24 hrs:   No results found for this or any previous visit (from the past 24 hours).

## 2025-05-11 NOTE — PROGRESS NOTES
Cardiology Progress Note    Date of Service: 05/11/2025  Patient: Lucian Oliveira  MRN: [id number]  Admission Date: 5/9/2025  Hospital Day: 2    Cardiology Problem List:  Ischemic Cardiomyopathy s/p OHT 7/19/2025  primary graft dysfunction  with recovered graft function  history of HIT status post Plex  Diabetes Mellitus, Type II  hypertension  hyperlipidemia  retinal detachment status post vitrectomy in December 2020  Pulmonary HTN, WHO class    Diarrhea, acute  DAYA  Hypokalemia  Hyponatremia    Assessment:   Lucian Oliveira is a 69 year old male with a past medical history significant for CAD s/p CABG in 2008, ICM and s/p orthotopic heart transplant in 7/2020, HTN, HLD, mild pulmonary HTN, CHANTEL, HIT s/p PLEX, CKD, Type II DM, vitreous hemorrhage, Norovirus and EAEC (2024 type 2 diabetes, and CKD stage 3, admitted 5/9/25 p/w diarrhea x 8 days with intermittent vomiting, mild abdominal pain and poor po intake, found to have DAYA, hypokalemia, hyponatremia and mild leukocytosis, with CT abdomen/pelvis showing mild enterocolitis. Cardiology heart failure team consulted for management of immunosuppression.     Patient reports he has had watery diarrhea for 8 days, 7-8 episodes per day without bleeding. Mild leukocytosis. Enteric pathogen panel negative and c-diff negative. CT A/P showing normal caliber small bowel, duodenal and jejunal diverticula w/o inflammation, cholelithiasis and fluid in colon possibly representing enterocolitis. Etiology of diarrhea currently unclear but likely infectious versus medication-induced. Strategy is to lower mycophenolate dose to half to allow patient to fight whatever infectious agent may be causing diarrhea.Transplant ID involved, appreciate recommendations. If no clear infectious cause given CT findings, he may need GI consult to evaluate for MMF toxicity. Diarrhea appears to be slowing without the use of loperamide. Awaiting CMV and EBV results and sirolimus  level, which will be drawn this morning.     Serostatus:   Viral serostatus: CMV D+/R+, EBV D+/R+, HSV 1+/2-, VZV +  Other serostatus: Toxoplasma D+/R-, on bactrim  Blood type: O positive     Immunosuppression:  -Calcineurin Inhibitor: Sirolimus 3 mg po daily, goal level 6-8  -Cell cycle Inhibitor: Mycophenolate 1500 mg BID  - No rejection history     Infection Prophylaxis:  Fungal Prophylaxis: none  Bacterial prophylaxis: none  PJP prophylaxis: tmp/smx  IgG:10/12/23 668  Qtc: 442 ms on 5/9/25     Recommendations:   Follow up on sirolimus level (drawn this morning)  Continue decreased mycophenolate dose of 750 mg BID  Continue Sirolimus 3 mg po daily  Transplant infectious disease following for cause of diarrhea, appreciate recs  CMV, EBV pending  IgG level pending  If infectious disease work up negative or unrevealing and patient continues to have symptoms, consider evaluation for mycophenolate-induced colitis.  We will continue to follow.  Rest of care per primary team.     Patient was seen and plan of care was discussed with attending physician Dr. Holder. Final recommendations pending Attending Attestation. Please don't hesitate to contact our team with any additional questions or concerns.     Mariano Patterson MD  Internal Medicine Resident, PGY-1    Subjective    Interval History:  No acute events overnight.  Patient with only 1 episode of watery diarrhea earlier this morning, denies abdominal pain/cramping, dizziness, CP, SOB.  No new complaints    Review of Systems:  A comprehensive ROS was performed and found to be negative or non-contributory with the exception of that noted in the HPI above.    Current Facility-Administered Medications   Medication Dose Route Frequency Provider Last Rate Last Admin    acetaminophen (TYLENOL) tablet 975 mg  975 mg Oral TID Olga Denney PA   975 mg at 05/11/25 0819    aspirin (ASA) chewable tablet 81 mg  81 mg Oral Daily Olga Denney PA   81 mg at 05/11/25 0820     benzocaine-menthol (CHLORASEPTIC MAX) 15-10 MG lozenge 1 lozenge  1 lozenge Buccal Q1H PRN Olga Denney PA        calcium carbonate (TUMS) chewable tablet 1,000 mg  1,000 mg Oral 4x Daily PRN Olga Denney PA        calcium carbonate-vitamin D (CALTRATE) 600-10 MG-MCG per tablet 1 tablet  1 tablet Oral BID w/meals Olga Denney PA   1 tablet at 05/11/25 0819    glucose gel 15-30 g  15-30 g Oral Q15 Min PRN Olga Denney PA        Or    dextrose 50 % injection 25-50 mL  25-50 mL Intravenous Q15 Min PRN Olga Denney PA        Or    glucagon injection 1 mg  1 mg Subcutaneous Q15 Min PRN Olga Denney PA        ferrous sulfate (FEROSUL) tablet 325 mg  325 mg Oral BID w/meals Olga Denney PA   325 mg at 05/11/25 0819    insulin aspart (NovoLOG) injection (RAPID ACTING)  1-7 Units Subcutaneous TID AC Olga Denney PA   1 Units at 05/11/25 0825    insulin aspart (NovoLOG) injection (RAPID ACTING)  1-5 Units Subcutaneous At Bedtime Olga Denney PA   1 Units at 05/10/25 2234    latanoprost (XALATAN) 0.005 % ophthalmic solution 1 drop  1 drop Both Eyes QPM Olga Denney PA   1 drop at 05/10/25 2002    lidocaine (LMX4) cream   Topical Q1H PRN Olga Denney PA        lidocaine 1 % 0.1-1 mL  0.1-1 mL Other Q1H PRN Olga Denney PA        [Held by provider] lisinopril (ZESTRIL) tablet 5 mg  5 mg Oral Daily Olga Denney PA   5 mg at 05/10/25 0944    loperamide (IMODIUM) capsule 2 mg  2 mg Oral 4x Daily PRN Cayetano Corona PA-C        magnesium oxide (MAG-OX) tablet 400 mg  400 mg Oral BID Olga Denney PA   400 mg at 05/10/25 2001    mycophenolate (GENERIC EQUIVALENT) capsule 750 mg  750 mg Oral BID IS Castro Roberts MD   750 mg at 05/11/25 0819    rosuvastatin (CRESTOR) tablet 10 mg  10 mg Oral Daily Olga Denney PA   10 mg at 05/11/25 0819    sirolimus (GENERIC EQUIVALENT) tablet 3 mg  3 mg Oral Daily Olga Denney PA   3 mg  at 05/10/25 1034    sodium bicarbonate 150 mEq in sterile water (preservative free) 1,000 mL infusion   Intravenous Continuous Justice Paiz  mL/hr at 05/11/25 0822 Rate Verify at 05/11/25 0822    sodium chloride (PF) 0.9% PF flush 3 mL  3 mL Intracatheter Q8H MUSA Olga Denney PA   3 mL at 05/10/25 1422    sodium chloride (PF) 0.9% PF flush 3 mL  3 mL Intracatheter q1 min prn Olga Denney PA        [Held by provider] sulfamethoxazole-trimethoprim (BACTRIM) 400-80 MG per tablet 1 tablet  1 tablet Oral Once per day on Monday Wednesday Friday Olga Denney PA        tamsulosin (FLOMAX) capsule 0.4 mg  0.4 mg Oral QAM Olga Denney PA   0.4 mg at 05/11/25 0819    traMADol (ULTRAM) half-tab 25 mg  25 mg Oral Q6H PRN Olga Denney PA         Objective   Physical Exam:    Blood pressure 90/57, pulse 95, temperature 97.9  F (36.6  C), temperature source Oral, resp. rate 16, SpO2 98%.    Exam:  General: NAD, resting comfortably in bed  HEENT: no scleral icterus or injection  CARDIAC: RRR, no murmurs. No JVD  RESP:  CTAB, no rales, wheezes or rhonchi or wheezes  GI: soft, nondistended, nontender, bowel sounds hypoactive,   : No villalobos  EXTREMITIES: no LE edema  SKIN: No acute lesions appreciated  NEURO: No focal deficits    Labs & Studies: I personally reviewed the following studies:  ROUTINE LABS (Last four results):  CMP  Recent Labs   Lab 05/11/25  0825 05/11/25  0635 05/11/25  0203 05/11/25  0000 05/10/25  2233 05/10/25  1841 05/10/25  1202 05/10/25  0931 05/09/25  2147 05/09/25  1625   NA  --  131*  --   --   --   --   --  129*  --  128*   POTASSIUM  --  3.0*  --  3.1*  --  3.1*  --  2.8*   < > 2.6*   CHLORIDE  --  98  --   --   --   --   --  99  --  92*   CO2  --  16*  --   --   --   --   --  11*  --  13*   ANIONGAP  --  17*  --   --   --   --   --  19*  --  23*   * 144* 190*  --  217*  --    < > 203*   < > 194*   BUN  --  105.0*  --   --   --   --   --  98.6*  --  93.8*   CR   --  4.00*  --   --   --   --   --  4.50*  --  4.88*   GFRESTIMATED  --  15*  --   --   --   --   --  13*  --  12*   BRYANNA  --  8.4*  --   --   --   --   --  8.5*  --  8.6*   MAG  --   --   --   --   --   --   --   --   --  1.7   PROTTOTAL  --   --   --   --   --   --   --   --   --  7.7   ALBUMIN  --   --   --   --   --   --   --   --   --  4.6   BILITOTAL  --   --   --   --   --   --   --   --   --  0.3   ALKPHOS  --   --   --   --   --   --   --   --   --  174*   AST  --   --   --   --   --   --   --   --   --  13   ALT  --   --   --   --   --   --   --   --   --  9    < > = values in this interval not displayed.     CBC  Recent Labs   Lab 05/11/25  0635 05/10/25  0931 05/09/25  1625   WBC 8.0 9.2 11.5*   RBC 3.65* 3.73* 4.10*   HGB 10.0* 10.1* 11.0*   HCT 28.8* 29.5* 32.9*   MCV 79 79 80   MCH 27.4 27.1 26.8   MCHC 34.7 34.2 33.4   RDW 15.8* 15.9* 16.0*    272 323     INRNo lab results found in last 7 days.    Imaging:  CT Abdomen Pelvis w/o Contrast   Final Result   IMPRESSION:    1. Fluid-filled colon which is nonspecific but can be seen with   enterocolitis. No additional acute findings in the abdomen or pelvis.   Normal appendix.   2. Cholelithiasis without evidence of cholecystitis.   3. Duodenal and jejunal diverticula without evidence of   diverticulitis.      I have personally reviewed the examination and initial interpretation   and I agree with the findings.      NIKOLE DAVILA MD            SYSTEM ID:  [id number]               Detail Level: Generalized Detail Level: Zone (4) no impairment

## 2025-05-11 NOTE — PLAN OF CARE
Goal Outcome Evaluation:  Occupational Therapy: Orders received. Chart reviewed and discussed with care team.? Occupational Therapy not indicated due to pt's only fxl need is act sammie and PT to address that need. No skilled OT needs at this time.? Defer discharge recommendations to PT.? Will complete orders.

## 2025-05-11 NOTE — PROGRESS NOTES
Nephrology Progress Note  05/11/2025           MEDICAL DECISION MAKING     RECOMMENDATIONS:  Reduce isotonic bicarb gtt rate (ordered)    Continue aggressive K repletion, his total body potassium is down due to diarrhea and his serum potassium will drop as his acidosis corrects     ASSESSMENT:  Lucian Oliveira is a 69 year old ICM s/p OHT (2020), T2DM, CKD, HTN, admitted for diarrhea and DAYA    DAYA on CKD 3b  sCr on admit 4.88.  Baseline sCr 2.25  UA: minimal protein, MANY hyaline casts  Imaging: no hydro on CT a/p  sirolimus at goal  Likely 2/2 hypovolemia in setting of significant diarrhea    Anemia: hgb 10.0  Volume/HTN: BP OK, appears dry  Acidosis: bicarb 16  MBD: Ca  8.4    Metabolic acidosis   Hypokalemia   Most driven by GI losses    #Diarrhea, acute   #Ischemic Cardiomyopathy s/p OHT 7/2020    Recommendations were communicated to primary team via note     Seen and discussed with Dr. Sajan Paiz MD   Division of Renal Disease and Hypertension  [facility]  [email or web address]  Vocera Web Console    SUBJECTIVE     Interval History :   Nursing and provider notes from last 24 hours reviewed.  In the last 24 hours Lucian Oliveira   BM has slowed, loose stool this AM, a soft one more recently  Eating lunch. No SOB    A comprehensive review of systems was negative except as noted above.    OBJECTIVE     Physical Exam:   I/O last 3 completed shifts:  In: 2130 [P.O.:1040; I.V.:1090]  Out: 300 [Urine:300]   BP 93/56 (BP Location: Right arm)   Pulse 92   Temp 97.4  F (36.3  C) (Oral)   Resp 18   SpO2 100%      General: up in bed   HEENT: mmm   Pulmonary: unlabored  Extremities: no LE edema     Labs:   All labs reviewed by me  Electrolytes/Renal -   Recent Labs   Lab Test 05/11/25  1155 05/11/25  0825 05/11/25  0635 05/11/25  0203 05/11/25  0000 05/10/25  2233 05/10/25  1841 05/10/25  1202 05/10/25  0931 05/09/25  2147 05/09/25  1625 12/05/24  1159 10/08/24  0937 07/22/24  0711 04/16/24  1033   NA   --   --  131*  --   --   --   --   --  129*  --  128* 137  --  141 138   POTASSIUM  --   --  3.0*  --  3.1*  --  3.1*  --  2.8*   < > 2.6* 4.4  --  3.6 4.8   CHLORIDE  --   --  98  --   --   --   --   --  99  --  92* 99  --  105 104   CO2  --   --  16*  --   --   --   --   --  11*  --  13* 28  --  26 23   BUN  --   --  105.0*  --   --   --   --   --  98.6*  --  93.8* 30.5*  --  34.8* 34.1*   CR  --   --  4.00*  --   --   --   --   --  4.50*  --  4.88* 2.35*  --  2.25* 2.22*   * 164* 144*   < >  --    < >  --    < > 203*   < > 194* 103*   < > 99 97   BRYANNA  --   --  8.4*  --   --   --   --   --  8.5*  --  8.6* 9.5  --  8.9 8.8   MAG  --   --   --   --   --   --   --   --   --   --  1.7  --   --  2.2 2.4*   PHOS  --   --   --   --   --   --   --   --   --   --   --  3.7  --  3.1 3.0    < > = values in this interval not displayed.       CBC -   Recent Labs   Lab Test 05/11/25  0635 05/10/25  0931 05/09/25  1625   WBC 8.0 9.2 11.5*   HGB 10.0* 10.1* 11.0*    272 323       LFTs -   Recent Labs   Lab Test 05/09/25  1625 12/05/24  1159 07/22/24  0711 04/16/24  1033   ALKPHOS 174*  --  119 110   BILITOTAL 0.3  --  0.3 0.3   ALT 9  --  8 9   AST 13  --  15 15   PROTTOTAL 7.7  --  6.6 7.0   ALBUMIN 4.6 4.0 3.8 4.0       Iron Panel -   Recent Labs   Lab Test 12/05/24  1159 07/22/24  0711 04/29/24  0736   IRON 44* 52* 52*   IRONSAT 18 26 26   SEVERIANO [phone]       Current Medications:  Current Facility-Administered Medications   Medication Dose Route Frequency Provider Last Rate Last Admin    acetaminophen (TYLENOL) tablet 975 mg  975 mg Oral TID Olga Denney PA   975 mg at 05/11/25 0819    aspirin (ASA) chewable tablet 81 mg  81 mg Oral Daily Olga Denney PA   81 mg at 05/11/25 0820    calcium carbonate-vitamin D (CALTRATE) 600-10 MG-MCG per tablet 1 tablet  1 tablet Oral BID w/meals Olga Denney PA   1 tablet at 05/11/25 0819    ferrous sulfate (FEROSUL) tablet 325 mg  325 mg Oral BID w/meals  Olga Denney PA   325 mg at 05/11/25 0819    insulin aspart (NovoLOG) injection (RAPID ACTING)  1-7 Units Subcutaneous TID AC Olga Denney PA   1 Units at 05/11/25 1237    insulin aspart (NovoLOG) injection (RAPID ACTING)  1-5 Units Subcutaneous At Bedtime Olga Denney PA   1 Units at 05/10/25 2234    latanoprost (XALATAN) 0.005 % ophthalmic solution 1 drop  1 drop Both Eyes QPM Olga Denney PA   1 drop at 05/10/25 2002    [Held by provider] lisinopril (ZESTRIL) tablet 5 mg  5 mg Oral Daily Olga Denney PA   5 mg at 05/10/25 0944    magnesium oxide (MAG-OX) tablet 400 mg  400 mg Oral BID Olga Denney PA   400 mg at 05/11/25 1237    mycophenolate (GENERIC EQUIVALENT) capsule 750 mg  750 mg Oral BID IS Castro Roberts MD   750 mg at 05/11/25 0819    rosuvastatin (CRESTOR) tablet 10 mg  10 mg Oral Daily Olga Denney PA   10 mg at 05/11/25 0819    sirolimus (GENERIC EQUIVALENT) tablet 3 mg  3 mg Oral Daily Olga Denney PA   3 mg at 05/11/25 1239    sodium chloride (PF) 0.9% PF flush 3 mL  3 mL Intracatheter Q8H MUSA Olga Denney PA   3 mL at 05/10/25 1422    [Held by provider] sulfamethoxazole-trimethoprim (BACTRIM) 400-80 MG per tablet 1 tablet  1 tablet Oral Once per day on Monday Wednesday Friday Olga Denney PA        tamsulosin (FLOMAX) capsule 0.4 mg  0.4 mg Oral QAM Olga Denney PA   0.4 mg at 05/11/25 0819     Current Facility-Administered Medications   Medication Dose Route Frequency Provider Last Rate Last Admin    sodium bicarbonate 150 mEq in sterile water (preservative free) 1,000 mL infusion   Intravenous Continuous Justice Paiz  mL/hr at 05/11/25 0822 Rate Verify at 05/11/25 0822     Justice Paiz MD

## 2025-05-11 NOTE — PLAN OF CARE
Goal Outcome Evaluation:    0246-2011    Pt noted to be comfortable on this shift, VSS, denies having pain and discomfort. PT is on  Sodium bicarbonate infusing @ 100 ml/hr. BS check done, no acute events noted, cont POC. Pt will be transferring to .

## 2025-05-11 NOTE — PLAN OF CARE
Goal Outcome Evaluation:      Plan of Care Reviewed With: patient    Overall Patient Progress: improvingOverall Patient Progress: improving    Outcome Evaluation: Denies nausea and pain. Vitally stable.    BP 90/57 (BP Location: Left arm)   Pulse 95   Temp 97.9  F (36.6  C) (Oral)   Resp 16   SpO2 98%      Shift: 4632-1436  VS: Hypotensive but within parameters (90's systolic)  Neuro: Aox4  BG: ACHS, 2200, and 0200  Labs: RN managed K, oral replacements given in evening   Pain/Nausea: Denies  Diet: Regular   LDA: L PIV  Infusions: MIVF sodium bicarb  GI/: Voids adequately. No BM overnight.   Skin: WDL  Mobility: SBA  Plan: Continue POC

## 2025-05-11 NOTE — PROGRESS NOTES
Admitted/transferred from: ER  Time of arrival on unit 1930    2 RN full  skin assessment completed by Ami ESTES and Jyotsna HOWARD    Skin assessment finding: skin intact, no problems. Dexcom noted on back of right arm    Will continue to monitor.

## 2025-05-11 NOTE — PROGRESS NOTES
"   05/11/25 0900   Appointment Info   Signing Clinician's Name / Credentials (PT) BERONICA Pretty   Rehab Comments (PT) Orthostatic, asymptomatic       Present yes   Language [place] via [employer]   Living Environment   People in Home spouse   Current Living Arrangements house   Home Accessibility no concerns   Transportation Anticipated family or friend will provide   Living Environment Comments Pt reports he lives with his wife, previously IND I/ADL and functional mobility. His kids and grandkids live nearby if he needs help.   Self-Care   Usual Activity Tolerance good   Current Activity Tolerance moderate   Regular Exercise No   Equipment Currently Used at Home grab bar, toilet;grab bar, tub/shower;shower chair   Fall history within last six months no   Activity/Exercise/Self-Care Comment Pt reports no use of AD at baseline. Assists wife with items around the house and enjoys spending his time with family. He has a flat mattress and grab bars at toilet/shower as well as shower chair he says he \"doesn't need to use\".   General Information   Onset of Illness/Injury or Date of Surgery 05/09/25   Referring Physician Olga Denney PA   Patient/Family Therapy Goals Statement (PT) Return home and feel better   Pertinent History of Current Problem (include personal factors and/or comorbidities that impact the POC) Lucian Oliveira is a 69 year old male with a past medical history significant for CAD s/p CABG in 2008, ICM and s/p orthotopic heart transplant in 7/2020, HTN, HLD, mild pulmonary HTN, CHANTEL, HIT s/p PLEX, CKD, Type II DM, vitreous hemorrhage, Norovirus and EAEC (2024 type 2 diabetes, and CKD stage 3, admitted 5/9/25 p/w diarrhea x 8 days with intermittent vomiting, mild abdominal pain and poor po intake, found to have DAYA, hypokalemia, hyponatremia and mild leukocytosis, with CT abdomen/pelvis showing mild enterocolitis. Cardiology heart failure team consulted for management of " immunosuppression.   Existing Precautions/Restrictions fall   Cognition   Affect/Mental Status (Cognition) WNL   Orientation Status (Cognition) oriented x 4   Follows Commands (Cognition) follows multi-step commands   Pain Assessment   Patient Currently in Pain No   Integumentary/Edema   Integumentary/Edema no deficits were identifed   Posture    Posture Forward head position;Protracted shoulders   Range of Motion (ROM)   Range of Motion ROM is WFL   ROM Comment Pt seated knee extension B slightly limited   Strength (Manual Muscle Testing)   Strength (Manual Muscle Testing) strength is WFL   Strength Comments Seated MMT grossly 5/5 B, aside from hip F 4/5 B   Bed Mobility   Comment, (Bed Mobility) Pt able t perform IND no reliance on bed rail noted   Transfers   Comment, (Transfers) Pt able to perform sit<>stand from EOB to no AD with SBA, pt performed with hands on knees   Gait/Stairs (Locomotion)   Pattern (Gait) swing-through   Deviations/Abnormal Patterns (Gait) antalgic;base of support, wide;ariela decreased;gait speed decreased;stride length decreased   Comment, (Gait/Stairs) Pt amb without AD with SBA   Balance   Balance Comments Pt able to sit EOB with out need for B UE support, pt statically standing with SBA no overt LOB but slight increase in sway.   Clinical Impression   Criteria for Skilled Therapeutic Intervention Yes, treatment indicated   PT Diagnosis (PT) Deconditioning   Influenced by the following impairments Pt decreased ROM, strength, orthostatic hypotension and reduced activitiy tolerance   Functional limitations due to impairments Pt unable to amb IND, pt decreased activity tolerance   Clinical Presentation (PT Evaluation Complexity) stable   Clinical Presentation Rationale per clinical judgement   Clinical Decision Making (Complexity) low complexity   Planned Therapy Interventions (PT) balance training;gait training;home exercise program;neuromuscular re-education;patient/family  education;ROM (range of motion);strengthening;stretching;transfer training;progressive activity/exercise;risk factor education   Risk & Benefits of therapy have been explained evaluation/treatment results reviewed;care plan/treatment goals reviewed;risks/benefits reviewed;current/potential barriers reviewed;participants voiced agreement with care plan;participants included;patient;spouse/significant other   Clinical Impression Comments Recommend PT to continue seeing pt in order to maintain and build activity tolerance during stay   PT Total Evaluation Time   PT Eval, Low Complexity Minutes (53112) 8   Physical Therapy Goals   PT Frequency 3x/week   PT Predicted Duration/Target Date for Goal Attainment 05/18/25   PT Goals Transfers;Gait;PT Goal 1   PT: Transfers Independent  (Decreased reliance of UE)   PT: Gait Independent;150 feet   PT: Goal 1 Pt able to adhere to HEP and perform IND   Interventions   Interventions Quick Adds Therapeutic Activity   Therapeutic Activity   Therapeutic Activities: dynamic activities to improve functional performance Minutes (81685) 27   Symptoms Noted During/After Treatment Fatigue   Treatment Detail/Skilled Intervention Pt greeted sitting EOB. Utlized  over XtremIO in today's session. Pt denied any lightheadedness or dizziness throughout the session but reported he has gotten lightheaded at times when getting out of bed. Seated EOB pt BP 92/48 (62), in standing pt BP dropped to 83/51 (61). Cued pt on sitting again, pt reports no dizziness/lightheadedness but need for use of restrom. Pt Amb without AD and SBA to restroom, able to manage door and don/doffing his pants IND. Pt able to sit<>stand with use of grab to assist and SBA. Pt amb back to bed and cued to lay supine adn scoot toward HOB, pt able to do so with SBA. In supine /58. Encouraged pt to call for nursing in order to use restroom to decrease fall risk. Provided edu to pt that if his BP is okay, he  can go walking in [place] with nursing, anticipate he may need WC follow in case of decrease in BP. Ended session due to OH and pt fatigue from restroom use. Nurse notified.   PT Discharge Planning   PT Plan Progress amb as BP allows (WC follow in [place]?), repeated sit<>stands, provide LE strengthening HEP   PT Discharge Recommendation (DC Rec) home with assist;home   PT Rationale for DC Rec Pt currently functionaing below baseline, decrease in activity tolerance and orthostatic at this time without sx in treatment today. Pt has assistance at home and lives on one level home.   PT Brief overview of current status Pt SBA in room mobility d/t OH status, WC follow with in [place] amb   PT Total Distance Amb During Session (feet) 20   Physical Therapy Time and Intention   Timed Code Treatment Minutes 27   Total Session Time (sum of timed and untimed services) 35

## 2025-05-12 LAB
ANION GAP SERPL CALCULATED.3IONS-SCNC: 16 MMOL/L (ref 7–15)
BUN SERPL-MCNC: 104 MG/DL (ref 8–23)
CALCIUM SERPL-MCNC: 8.3 MG/DL (ref 8.8–10.4)
CHLORIDE SERPL-SCNC: 96 MMOL/L (ref 98–107)
CREAT SERPL-MCNC: 3.27 MG/DL (ref 0.67–1.17)
EGFRCR SERPLBLD CKD-EPI 2021: 19 ML/MIN/1.73M2
ERYTHROCYTE [DISTWIDTH] IN BLOOD BY AUTOMATED COUNT: 15.8 % (ref 10–15)
GLUCOSE BLDC GLUCOMTR-MCNC: 130 MG/DL (ref 70–99)
GLUCOSE BLDC GLUCOMTR-MCNC: 192 MG/DL (ref 70–99)
GLUCOSE BLDC GLUCOMTR-MCNC: 220 MG/DL (ref 70–99)
GLUCOSE BLDC GLUCOMTR-MCNC: 221 MG/DL (ref 70–99)
GLUCOSE BLDC GLUCOMTR-MCNC: 96 MG/DL (ref 70–99)
GLUCOSE SERPL-MCNC: 113 MG/DL (ref 70–99)
HCO3 SERPL-SCNC: 23 MMOL/L (ref 22–29)
HCT VFR BLD AUTO: 26.2 % (ref 40–53)
HGB BLD-MCNC: 8.9 G/DL (ref 13.3–17.7)
IGG SERPL-MCNC: 758 MG/DL (ref 610–1616)
MCH RBC QN AUTO: 26.7 PG (ref 26.5–33)
MCHC RBC AUTO-ENTMCNC: 34 G/DL (ref 31.5–36.5)
MCV RBC AUTO: 79 FL (ref 78–100)
PLATELET # BLD AUTO: 213 10E3/UL (ref 150–450)
POTASSIUM SERPL-SCNC: 3.5 MMOL/L (ref 3.4–5.3)
POTASSIUM SERPL-SCNC: 3.5 MMOL/L (ref 3.4–5.3)
RBC # BLD AUTO: 3.33 10E6/UL (ref 4.4–5.9)
SIROLIMUS BLD-MCNC: 6.6 UG/L (ref 5–15)
SODIUM SERPL-SCNC: 135 MMOL/L (ref 135–145)
TME LAST DOSE: NORMAL H
TME LAST DOSE: NORMAL H
WBC # BLD AUTO: 7.8 10E3/UL (ref 4–11)

## 2025-05-12 PROCEDURE — 258N000003 HC RX IP 258 OP 636: Performed by: PHYSICIAN ASSISTANT

## 2025-05-12 PROCEDURE — 250N000013 HC RX MED GY IP 250 OP 250 PS 637: Performed by: PHYSICIAN ASSISTANT

## 2025-05-12 PROCEDURE — 85014 HEMATOCRIT: CPT | Performed by: PHYSICIAN ASSISTANT

## 2025-05-12 PROCEDURE — 250N000013 HC RX MED GY IP 250 OP 250 PS 637

## 2025-05-12 PROCEDURE — 99232 SBSQ HOSP IP/OBS MODERATE 35: CPT | Performed by: INTERNAL MEDICINE

## 2025-05-12 PROCEDURE — 99207 PR APP CREDIT; MD BILLING SHARED VISIT: CPT | Mod: FS | Performed by: PHYSICIAN ASSISTANT

## 2025-05-12 PROCEDURE — 120N000011 HC R&B TRANSPLANT UMMC

## 2025-05-12 PROCEDURE — 80048 BASIC METABOLIC PNL TOTAL CA: CPT

## 2025-05-12 PROCEDURE — 36415 COLL VENOUS BLD VENIPUNCTURE: CPT

## 2025-05-12 PROCEDURE — 82310 ASSAY OF CALCIUM: CPT | Performed by: PHYSICIAN ASSISTANT

## 2025-05-12 PROCEDURE — 250N000009 HC RX 250: Performed by: STUDENT IN AN ORGANIZED HEALTH CARE EDUCATION/TRAINING PROGRAM

## 2025-05-12 PROCEDURE — 80195 ASSAY OF SIROLIMUS: CPT

## 2025-05-12 PROCEDURE — 99222 1ST HOSP IP/OBS MODERATE 55: CPT

## 2025-05-12 PROCEDURE — 999N000248 HC STATISTIC IV INSERT WITH US BY RN

## 2025-05-12 PROCEDURE — 250N000012 HC RX MED GY IP 250 OP 636 PS 637

## 2025-05-12 PROCEDURE — 99233 SBSQ HOSP IP/OBS HIGH 50: CPT | Mod: GC | Performed by: INTERNAL MEDICINE

## 2025-05-12 PROCEDURE — 99233 SBSQ HOSP IP/OBS HIGH 50: CPT | Mod: FS | Performed by: STUDENT IN AN ORGANIZED HEALTH CARE EDUCATION/TRAINING PROGRAM

## 2025-05-12 PROCEDURE — 250N000012 HC RX MED GY IP 250 OP 636 PS 637: Performed by: PHYSICIAN ASSISTANT

## 2025-05-12 PROCEDURE — 36415 COLL VENOUS BLD VENIPUNCTURE: CPT | Performed by: PHYSICIAN ASSISTANT

## 2025-05-12 PROCEDURE — 83993 ASSAY FOR CALPROTECTIN FECAL: CPT

## 2025-05-12 PROCEDURE — 99232 SBSQ HOSP IP/OBS MODERATE 35: CPT | Mod: GC | Performed by: INTERNAL MEDICINE

## 2025-05-12 RX ORDER — BISACODYL 5 MG
5 TABLET, DELAYED RELEASE (ENTERIC COATED) ORAL DAILY PRN
Status: DISCONTINUED | OUTPATIENT
Start: 2025-05-13 | End: 2025-05-12

## 2025-05-12 RX ORDER — BISACODYL 5 MG
5 TABLET, DELAYED RELEASE (ENTERIC COATED) ORAL DAILY PRN
Status: DISCONTINUED | OUTPATIENT
Start: 2025-05-12 | End: 2025-05-12

## 2025-05-12 RX ORDER — LOPERAMIDE HYDROCHLORIDE 2 MG/1
2 CAPSULE ORAL 3 TIMES DAILY
Status: DISCONTINUED | OUTPATIENT
Start: 2025-05-12 | End: 2025-05-16

## 2025-05-12 RX ORDER — MULTIPLE VITAMINS W/ MINERALS TAB 9MG-400MCG
1 TAB ORAL DAILY
Status: DISCONTINUED | OUTPATIENT
Start: 2025-05-12 | End: 2025-05-17 | Stop reason: HOSPADM

## 2025-05-12 RX ORDER — BISACODYL 5 MG
5 TABLET, DELAYED RELEASE (ENTERIC COATED) ORAL ONCE
Status: COMPLETED | OUTPATIENT
Start: 2025-05-12 | End: 2025-05-12

## 2025-05-12 RX ORDER — SODIUM CHLORIDE, SODIUM LACTATE, POTASSIUM CHLORIDE, CALCIUM CHLORIDE 600; 310; 30; 20 MG/100ML; MG/100ML; MG/100ML; MG/100ML
INJECTION, SOLUTION INTRAVENOUS CONTINUOUS
Status: DISCONTINUED | OUTPATIENT
Start: 2025-05-12 | End: 2025-05-15

## 2025-05-12 RX ORDER — BISACODYL 5 MG
5 TABLET, DELAYED RELEASE (ENTERIC COATED) ORAL ONCE
Status: DISCONTINUED | OUTPATIENT
Start: 2025-05-12 | End: 2025-05-13

## 2025-05-12 RX ADMIN — SIROLIMUS 3 MG: 2 TABLET ORAL at 07:55

## 2025-05-12 RX ADMIN — MYCOPHENOLATE MOFETIL 750 MG: 250 CAPSULE ORAL at 18:00

## 2025-05-12 RX ADMIN — LOPERAMIDE HYDROCHLORIDE 2 MG: 2 CAPSULE ORAL at 14:23

## 2025-05-12 RX ADMIN — LATANOPROST 1 DROP: 50 SOLUTION OPHTHALMIC at 22:17

## 2025-05-12 RX ADMIN — FERROUS SULFATE TAB 325 MG (65 MG ELEMENTAL FE) 325 MG: 325 (65 FE) TAB at 18:00

## 2025-05-12 RX ADMIN — Medication 1 TABLET: at 17:06

## 2025-05-12 RX ADMIN — MYCOPHENOLATE MOFETIL 750 MG: 250 CAPSULE ORAL at 07:55

## 2025-05-12 RX ADMIN — INSULIN ASPART 2 UNITS: 100 INJECTION, SOLUTION INTRAVENOUS; SUBCUTANEOUS at 12:37

## 2025-05-12 RX ADMIN — BISACODYL 5 MG: 5 TABLET, COATED ORAL at 17:06

## 2025-05-12 RX ADMIN — ROSUVASTATIN CALCIUM 10 MG: 10 TABLET, FILM COATED ORAL at 07:55

## 2025-05-12 RX ADMIN — FERROUS SULFATE TAB 325 MG (65 MG ELEMENTAL FE) 325 MG: 325 (65 FE) TAB at 07:55

## 2025-05-12 RX ADMIN — MAGNESIUM 64 MG (MAGNESIUM CHLORIDE) TABLET,DELAYED RELEASE 535 MG: at 14:23

## 2025-05-12 RX ADMIN — ACETAMINOPHEN 975 MG: 325 TABLET ORAL at 07:55

## 2025-05-12 RX ADMIN — MAGNESIUM OXIDE TAB 400 MG (241.3 MG ELEMENTAL MG) 400 MG: 400 (241.3 MG) TAB at 12:24

## 2025-05-12 RX ADMIN — ACETAMINOPHEN 975 MG: 325 TABLET ORAL at 21:25

## 2025-05-12 RX ADMIN — CALCIUM CARBONATE 600 MG (1,500 MG)-VITAMIN D3 400 UNIT TABLET 1 TABLET: at 07:55

## 2025-05-12 RX ADMIN — POLYETHYLENE GLYCOL 3350, SODIUM SULFATE ANHYDROUS, SODIUM BICARBONATE, SODIUM CHLORIDE, POTASSIUM CHLORIDE 2000 ML: 236; 22.74; 6.74; 5.86; 2.97 POWDER, FOR SOLUTION ORAL at 17:58

## 2025-05-12 RX ADMIN — SODIUM CHLORIDE, SODIUM LACTATE, POTASSIUM CHLORIDE, AND CALCIUM CHLORIDE: .6; .31; .03; .02 INJECTION, SOLUTION INTRAVENOUS at 22:24

## 2025-05-12 RX ADMIN — LOPERAMIDE HYDROCHLORIDE 2 MG: 2 CAPSULE ORAL at 10:30

## 2025-05-12 RX ADMIN — INSULIN ASPART 2 UNITS: 100 INJECTION, SOLUTION INTRAVENOUS; SUBCUTANEOUS at 18:10

## 2025-05-12 RX ADMIN — CALCIUM CARBONATE 600 MG (1,500 MG)-VITAMIN D3 400 UNIT TABLET 1 TABLET: at 17:06

## 2025-05-12 RX ADMIN — SODIUM BICARBONATE: 84 INJECTION, SOLUTION INTRAVENOUS at 12:24

## 2025-05-12 RX ADMIN — ACETAMINOPHEN 975 MG: 325 TABLET ORAL at 14:23

## 2025-05-12 RX ADMIN — ASPIRIN 81 MG CHEWABLE TABLET 81 MG: 81 TABLET CHEWABLE at 07:55

## 2025-05-12 RX ADMIN — TAMSULOSIN HYDROCHLORIDE 0.4 MG: 0.4 CAPSULE ORAL at 07:55

## 2025-05-12 ASSESSMENT — ACTIVITIES OF DAILY LIVING (ADL)
ADLS_ACUITY_SCORE: 40
ADLS_ACUITY_SCORE: 67
ADLS_ACUITY_SCORE: 67
ADLS_ACUITY_SCORE: 40
ADLS_ACUITY_SCORE: 67
ADLS_ACUITY_SCORE: 40
ADLS_ACUITY_SCORE: 40
ADLS_ACUITY_SCORE: 67
ADLS_ACUITY_SCORE: 67
ADLS_ACUITY_SCORE: 40
ADLS_ACUITY_SCORE: 67
ADLS_ACUITY_SCORE: 67
ADLS_ACUITY_SCORE: 40
ADLS_ACUITY_SCORE: 67
ADLS_ACUITY_SCORE: 40
ADLS_ACUITY_SCORE: 67
ADLS_ACUITY_SCORE: 40
ADLS_ACUITY_SCORE: 67
ADLS_ACUITY_SCORE: 67
ADLS_ACUITY_SCORE: 40

## 2025-05-12 NOTE — PLAN OF CARE
Goal Outcome Evaluation:      Plan of Care Reviewed With: patient    Overall Patient Progress: improvingOverall Patient Progress: improving    Outcome Evaluation: Nausea improved after antiemetic. Vitally stable    /51 (BP Location: Left arm, Patient Position: Semi-Borja's)   Pulse 95   Temp 97.8  F (36.6  C) (Oral)   Resp 16   SpO2 99%      Shift: 8978-8508  VS: Hypotensive but within parameters  Neuro: Aox4  BG: ACHS, 2200, and 0200  Labs: RN managed K, oral replacements given for K of 3.2.  Pain/Nausea: Tylenol and tramadol given for moderate low back pain. Denied nausea.   Diet: Regular   LDA: L PIV  Infusions: MIVF sodium bicarb at 50 mL/hr   GI/: Voids adequately. No BM's overnight.  Skin: WDL  Mobility: UAL  Plan: Continue POC

## 2025-05-12 NOTE — PLAN OF CARE
Goal Outcome Evaluation:      Plan of Care Reviewed With: patient, spouse, family    Overall Patient Progress: no changeOverall Patient Progress: no change    Outcome Evaluation: Nausea/vomiting this evening, diarrhea continues    /67 (BP Location: Left arm)   Pulse 110   Temp 98.7  F (37.1  C) (Oral)   Resp 16   SpO2 97%    Neuro: A/Ox4  VS: VSS on RA  Pain: c/o back pain treated by scheduled tylenol  , 154, and 177 on Novolog sliding scale   K 3.0 in AM replaced with 40mEq rechecked K 3.4 and replaced with 20mEq next recheck tomorrow AM  GI: On regular diet, c/o nausea/vomiting x3 this evening after eating dinner treated by Zofran IV. Diarrhea x3 and Imodium was given  : Voiding without difficulty  PIV: Infusing Na bicarb 50ml/h  Activity - SBA/independent in room

## 2025-05-12 NOTE — CONSULTS
Gastroenterology Consultation      Date of Admission:  5/9/2025  Reason for Admission: DAYA, Diarrhea   Date of Consult  5/12/2025   Requesting Physician:  Madisyn Kennedy DO           ASSESSMENT AND RECOMMENDATIONS:   Assessment:  69 y/o male with a history of orthotopic heart transplant 7/19/2020, HIT s/p PLEX, CKD, Type II DM, vitreous hemorrhage, Norovirus and EAEC (2024) who developed acute onset of diarrhea on 5/2.     #Loose stools   #Hypokalemia   Presents with watery diarrhea for 10 days, 7-8 episodes per day without bleeding. Denies pain & nausea (however Zofran and compazine ordered and taken) and tolerating a regular diet with good appetite. Since admission bowel movements have been about the same but he does not have abdominal pan, fevers,leukocytosis. CMV, EMV, Enteric pathogen panel, C diff negative. K+: 3.2 (L) likely 2/2 to ongoing diarrhea. Electrolytes being replaced. Last colonoscopy 10/2024 w/ poor prep. CT A/P showing normal caliber small bowel, duodenal and jejunal diverticula w/o inflammation, cholelithiasis and fluid in colon possibly representing enterocolitis.Cardiology reduced MMF dose on 5/10, no significant change in stool output today.     Acute diarrhea differential includes infectious (C. Diff, viral, other bacterial, parasitic) ability to r/o infectious with enteric panel, constipation with overflow loose stools, however no excessive stool noted on CT, except for fluid filled colon, which is nonspecific. Suspect this could be related to medication-induced, with ongoing use of Magnesium oxide vs mycophenolate toxicity. Although potentially mild improvement with decrease of mycophenolate from 1500 mg to 750 mg.    #Weight loss  Pt noted a #6 wt loss in the past 10 days with continued good appetite. Reports no other weight loss, however per chart review, pt with 13% wt loss in the past 5 months. Recommend RD consult once colonoscopy completed 5/14.  Wt Readings from Last 10  Encounters:   05/09/25 70.8 kg (156 lb)   05/07/25 72.1 kg (159 lb)   05/06/25 73.5 kg (162 lb)   12/16/24 81.6 kg (180 lb)   12/05/24 81.6 kg (180 lb)   09/27/24 83.5 kg (184 lb)   09/17/24 83 kg (183 lb)   08/06/24 82 kg (180 lb 12.8 oz)   07/22/24 82.6 kg (182 lb)   04/16/24 79.8 kg (176 lb)     #Anemia of chronic illness  Pt presents with Hgb 11>8.9 over the past few days, suspect dilutional effect from IVF. Also has had down trending of MCV over the past year from 91>79 (although seems to be ~ 85 on average over the past few years) with a low RBC of 3.33. He also has low iron at 44 from 12/5/2024 as well as previously low TIBC suggestive of anemia of chronic disease. He takes 323 mg of ferrous sulfate daily. Plan to re-order iron studies.     Recommendations:  -- Plan for Colonoscopy/Upper endoscopy WEDNESDAY (Date: 5/14). (CMV stain during colonoscopy, MMF)  To prepare please ensure the following:  - TODAY 5/12 [Date]:  - Clear liquid diet (no red, no purple) today until midnight.  - At 15:30: Bisacodyl 5 mg PO.   - At 16:00: 2 L Golytely, drink 12 ounces every 15 minutes until 2 L is gone (Finished by 18:00).   - TOMORROW 5/13 [Date]:  - Clear liquid diet (no red, no purple) today until midnight.  - At 15:30: Bisacodyl 5 mg PO.  - At 16:00: 4 L Golytely, drink 12 ounces every 15 minutes until 4 L is gone (Finished by 20:00).  - PROCEDURE DAY 5/14 [Date]:   - At 00:15 NPO other than additional prep and small sip of water with morning medications.  - STAT AM Labs at 0400: CBC with Platelets with Diff , CMP, Magnesium, Phosphorus, INR.   - At 04:00, another 2 L Golytely: drink 12 ounces every 15 minutes until 2 L is gone (finished by 06:00). --> REQUIRED (NOT PRN).   - At 06:00, if stools not clear (mountain dew, tea or broth in color), administer PRN 2 L Golytely: drink 12 ounces every 15 minutes until 2 L is gone (finished by 08:00).   - STRICT NPO (NO MORE BOWEL PREP) at  [2 hours prior to procedure time].  -  Please ensure multiple antiemetic options are available during prep.  - Continue to hold anticoagulants, antiplatelets.   - Anticipate fluid and electrolyte shifts during this process, monitor closely.  -- Avoid NSAIDs.  -- Analgesia/Antiemetics per primary team.  -- Discontinue Magox (this is a highly osmotic medication) or SlowMg.   -- Fecal Calprotectin (ordered)  -- Iron studies (ordered)  -- Multivitamin (ordered)  -- Recommend discontinue imodium until bowel prep is complete and colonoscopy is done (discontinued)  -- Dietitian consult       Discussed with Medicine team, Cj    Gastroenterology follow up recommendations: ONI    Thank you for involving us in this patient's care. Please do not hesitate to contact the GI service with any questions or concerns.     Pt seen and care plan discussed with Dr. John, GI staff physician.    Overall time spent on the date of this encounter preparing to see the patient (including chart review of available notes, clinical status events, imaging and labs); obtaining and/or reviewing separately obtained history; ordering medications, tests or procedures; communicating with other health care professionals; and documenting the above clinical information in the electronic medical record was 60 minutes.      Rosa M Martinez PA-C, KENNETH  Inpatient Gastroenterology Service  [facility]  -------------------------------------------------------------------------------------------------------------------       Reason for Consultation:       Acute diarrhea, etiology unclear. Infectious work up negative. Concern for MMF induced colitis. Transplant Cardiology requesting GI consult for colonoscopy to further evaluate.          History of Present Illness:   69 y/o male with a history of orthotopic heart transplant 7/19/2020, HIT s/p PLEX, CKD, Type II DM, vitreous hemorrhage, Norovirus and EAEC (2024) who developed acute onset of diarrhea on 5/2.     Patient  seen and examined at 2:15 PM. History is obtained from patient and wife. Pt needs a . Pt reports new onset of diarrhea 5/2/25 (about 10 days ago). Reports no inciting event. Reported he could only think of having [place] [employer]ice from [employer]?. He reports prior to 5/2, he reports he has about 2 BM+/day. Normal soft consistency. Has been on Mag ox since transplant. Reports he had 8 BM+ yesterday and 4 today. States he does feel that it is improving slightly. (Note, MMF was reduced to half yesterday). Also reports weight loss of 6# int he past 10 days from constant loose stools. Reports he has a good appetite still. Denies any abdominal pain, n/v (although taking zofran and compazine), no blood in stool, fevers or heart burn.     Previous Procedures:  UPPER GI ENDOSCOPY (10/08/2024 9:51 AM)   COLONOSCOPY (10/08/2024 9:46 AM)             Past Medical History:   Reviewed and edited as appropriate  Past Medical History:   Diagnosis Date    CAD (coronary artery disease)     CHF (congestive heart failure) (H)     CKD (chronic kidney disease), stage III (H)     Cortical cataract of both eyes     Diabetes (H)     E. coli colitis 10/12/2023    Hyperlipidemia     Hypertension     Infection due to Streptococcus mitis group 09/23/2020    Ischemic cardiomyopathy     Norovirus 10/12/2023    Obesity     CHANTEL (obstructive sleep apnea)     occas cpap    CHANTEL (obstructive sleep apnea)- severe (AHI 30)     Osteoarthritis             Past Surgical History:   Reviewed and edited as appropriate   Past Surgical History:   Procedure Laterality Date    CATARACT IOL, RT/LT      COLONOSCOPY N/A 8/7/2019    Procedure: COLONOSCOPY, WITH POLYPECTOMY AND BIOPSY;  Surgeon: Chauncey Morataya MD;  Location:  GI    COLONOSCOPY N/A 5/12/2023    Procedure: Colonoscopy;  Surgeon: Mariano Velasquez MD;  Location:  GI    COLONOSCOPY N/A 10/8/2024    Procedure: Colonoscopy;  Surgeon: Rip Bryan MD;  Location:  GI     CV ANGIOGRAM CORONARY GRAFT N/A 7/2/2020    Procedure: Angiogram Coronary Graft;  Surgeon: Alex Lantigua MD;  [facility]    CV CORONARY ANGIOGRAM N/A 7/2/2020    Procedure: CV CORONARY ANGIOGRAM;  Surgeon: Alex Lantigua MD;  Location:  [facility] C[facility]    CV CORONARY ANGIOGRAM N/A 7/20/2021    Procedure: CV CORONARY ANGIOGRAM;  Surgeon: Raimundo Hudson MD;  [facility]    CV CORONARY ANGIOGRAM N/A 9/12/2022    Procedure: Coronary Angiogram- BIPLANE;  Surgeon: Raimundo Hudson MD;  Location:  [facility]    CV CORONARY ANGIOGRAM N/A 7/17/2023    Procedure: Coronary Angiogram;  Surgeon: Alex Lantigua MD;  Location:  [facility]    CV HEART BIOPSY N/A 7/27/2020    Procedure: Heart Biopsy;  Surgeon: Mario Burr MD;  [facility]    CV HEART BIOPSY N/A 8/3/2020    Procedure: CV HEART BIOPSY;  Surgeon: Alex Lantigua MD;  [facility]    CV HEART BIOPSY N/A 8/10/2020    Procedure: CV HEART BIOPSY;  Surgeon: Mario Burr MD;  Location:  [facility]    CV HEART BIOPSY N/A 8/17/2020    Procedure: CV HEART BIOPSY;  Surgeon: Raimundo Hudson MD;  Location:  [facility] CATH [facility]    CV HEART BIOPSY N/A 8/31/2020    Procedure: CV HEART BIOPSY;  Surgeon: Moises Santos MD;  [facility]    CV HEART BIOPSY N/A 9/14/2020    Procedure: CV HEART BIOPSY;  Surgeon: Raimundo Hudson MD;  Location:  [facility]    CV HEART BIOPSY N/A 9/28/2020    Procedure: CV HEART BIOPSY;  Surgeon: Raimundo Hudson MD;  [facility]    CV HEART BIOPSY N/A 10/12/2020    Procedure: CV HEART BIOPSY;  Surgeon: John Stuart MD;  Location:  [facility]    CV HEART BIOPSY N/A 11/10/2020    Procedure: CV HEART BIOPSY;   Surgeon: John Stuart MD;  Location:  [facility]    CV HEART BIOPSY N/A 12/7/2020    Procedure: CV HEART BIOPSY;  Surgeon: Raimundo Hudson MD;  Location:  [facility]ATH [facility]    CV HEART BIOPSY N/A 1/5/2021    Procedure: CV HEART BIOPSY;  Surgeon: Raimundo Hudson MD;  Location:  [facility]    CV HEART BIOPSY N/A 7/20/2021    Procedure: CV HEART BIOPSY;  Surgeon: Raimundo Hudson MD;  Location:  [facility]    CV HEART BIOPSY N/A 9/28/2021    Procedure: CV HEART BIOPSY;  Surgeon: Raimundo Hudson MD;  Location:  [facility]    CV HEART BIOPSY N/A 9/12/2022    Procedure: Heart Biopsy;  Surgeon: Raimundo Hudson MD;  Location:  [facility]    CV HEART BIOPSY N/A 7/17/2023    Procedure: Heart Biopsy;  Surgeon: Alex Lantigua MD;  Location:  [facility] CATH LAB    CV INTRA AORTIC BALLOON N/A 7/14/2020    Procedure: RHC WITH LEAVE IN [place]/IABP;  Surgeon: Sonny Saleh MD;  Location:  [facility]ATH [facility]    CV INTRAVASULAR ULTRASOUND N/A 9/12/2022    Procedure: Intravascular Ultrasound;  Surgeon: Raimundo Hudson MD;  Location:  [facility]ATH LAB    CV RIGHT HEART CATH MEASUREMENTS RECORDED N/A 3/25/2019    Procedure: CV RIGHT HEART CATH;  Surgeon: Moises Santos MD;  Location:  [facility]ATH [facility]    CV RIGHT HEART CATH MEASUREMENTS RECORDED N/A 7/10/2019    Procedure: CV RIGHT HEART CATH;  Surgeon: Jak Mccabe MD;  Location:  [facility]    CV RIGHT HEART CATH MEASUREMENTS RECORDED N/A 7/8/2020    Procedure: Right Heart Cath with Leave In [place] already has ICU load;  Surgeon: Jak Mccabe MD;  Location:  [facility]ATH [facility]    CV RIGHT HEART CATH MEASUREMENTS RECORDED N/A 7/14/2020    Procedure: CV RIGHT HEART CATH;  Surgeon: Sonny Saleh MD;  Location:  [facility]    CV  RIGHT HEART CATH MEASUREMENTS RECORDED N/A 7/2/2020    Procedure: CV RIGHT HEART CATH;  Surgeon: Alex Lantigua MD;  [facility]    CV RIGHT HEART CATH MEASUREMENTS RECORDED N/A 7/27/2020    Procedure: Right Heart Cath;  Surgeon: Mario Burr MD;  Location:  [facility]    CV RIGHT HEART CATH MEASUREMENTS RECORDED N/A 8/3/2020    Procedure: Right Heart Cath;  Surgeon: Alex Lantigua MD;  [facility]    CV RIGHT HEART CATH MEASUREMENTS RECORDED N/A 8/10/2020    Procedure: CV RIGHT HEART CATH;  Surgeon: Mario Burr MD;  Location:  [facility]ATH LAB    CV RIGHT HEART CATH MEASUREMENTS RECORDED N/A 8/17/2020    Procedure: CV RIGHT HEART CATH;  Surgeon: Raimundo Hudson MD;  Location:  [facility]ATH LAB    CV RIGHT HEART CATH MEASUREMENTS RECORDED N/A 8/31/2020    Procedure: CV RIGHT HEART CATH;  Surgeon: Moises Santos MD;  Location:  [facility]ATH LAB    CV RIGHT HEART CATH MEASUREMENTS RECORDED N/A 9/14/2020    Procedure: CV RIGHT HEART CATH;  Surgeon: Raimundo Hudson MD;  Location:  [facility]ARD[facility] CATH LAB    CV RIGHT HEART CATH MEASUREMENTS RECORDED N/A 9/28/2020    Procedure: CV RIGHT HEART CATH;  Surgeon: Raimundo Hudson MD;  Location:  [facility]ATH LAB    CV RIGHT HEART CATH MEASUREMENTS RECORDED N/A 10/12/2020    Procedure: CV RIGHT HEART CATH;  Surgeon: John Stuart MD;  [facility]    CV RIGHT HEART CATH MEASUREMENTS RECORDED N/A 11/10/2020    Procedure: CV RIGHT HEART CATH;  Surgeon: John Stuart MD;  [facility]    CV RIGHT HEART CATH MEASUREMENTS RECORDED N/A 12/7/2020    Procedure: CV RIGHT HEART CATH;  Surgeon: Raimundo Hudson MD;  Location:  [facility]    CV RIGHT HEART CATH MEASUREMENTS RECORDED N/A 1/5/2021    Procedure: CV RIGHT HEART CATH;  Surgeon:  Raimundo Hudson MD;  Location:  [facility]    CV RIGHT HEART CATH MEASUREMENTS RECORDED N/A 7/20/2021    Procedure: CV RIGHT HEART CATH;  Surgeon: Raimundo Hudson MD;  Location:  [facility]    CV RIGHT HEART CATH MEASUREMENTS RECORDED N/A 9/28/2021    Procedure: CV RIGHT HEART CATH;  Surgeon: Raimundo Hudson MD;  Location:  [facility]    CV RIGHT HEART CATH MEASUREMENTS RECORDED N/A 9/12/2022    Procedure: Right Heart Catheterization;  Surgeon: Raimundo Hudson MD;  Location:  [facility]    CV RIGHT HEART CATH MEASUREMENTS RECORDED N/A 7/17/2023    Procedure: Right Heart Catheterization;  Surgeon: Alex Lantigua MD;  Location:  [facility]    ESOPHAGOSCOPY, GASTROSCOPY, DUODENOSCOPY (EGD), COMBINED N/A 10/8/2024    Procedure: ESOPHAGOGASTRODUODENOSCOPY, WITH BIOPSY;  Surgeon: Rip Bryan MD;  [facility]    EXTRACTION(S) DENTAL Left 7/13/2020    Procedure: EXTRACTION, TOOTH #11, 12, 13, 15, and 29;  Surgeon: Monica Chao DDS;  Location:  OR    IMPLANT AUTOMATIC IMPLANTABLE CARDIOVERTER DEFIBRILLATOR      INCISION AND DRAINAGE STERNUM W/ IRRIGATION SYSTEM, COMBINED N/A 9/23/2020    Procedure: INCISION AND DRAINAGE, LEFT SUPRACLAVICULAR WOUND INFECTION AND ABDOMENN WOUND.;  Surgeon: Ran Huertas MD;  [facility]    INSERT INTRAAORTIC BALLOON PUMP N/A 7/16/2020    Procedure: Subclavian Intra-Aortic Balloon Pump Placement;  Surgeon: Allan Sparrow MD;  [facility]    IRRIGATION AND DEBRIDEMENT CHEST WASHOUT, COMBINED N/A 9/28/2020    Procedure: IRRIGATION AND DEBRIDEMENT OF LEFT UPPER CHEST WOUND.;  Surgeon: Ran Huertas MD;  [facility]    PHACOEMULSIFICATION CLEAR CORNEA WITH STANDARD INTRAOCULAR LENS IMPLANT Right 2/6/2023    Procedure: RIGHT EYE PHACOEMULSIFICATION, CATARACT, WITH INTRAOCULAR LENS IMPLANT with trypan blue;  Surgeon: Triny Bunch  MD [place];  [facility]    PHACOEMULSIFICATION CLEAR CORNEA WITH STANDARD IOL, VITRECTOMY PARSPLANA 25 GAGUE, COMBINED Left 12/10/2019    Procedure: PHACOEMULSIFICATION, CATARACT, CLEAR CORNEAL INCISION APPROACH, W STD INTRAOCULAR LENS IMPLANT INSERT + VITRECTOMY BY PARS PLANA  USING 25-GAUGE INSTRUMENTS. ENDOLASER, INFUSION OF 20% SF6 GAS;  Surgeon: Triny Bunch MD;  [facility]    PICC DOUBLE LUMEN PLACEMENT Left 07/28/2020    5Fr - 42cm, Basilic vein, mid SVC    PICC INSERTION Right 07/11/2020    basilic 44 cm total     TRANSPLANT HEART RECIPIENT N/A 7/19/2020    Procedure: REDO MEDIAN STERNOTOMY, TRANSPLANT, ORTHOTOPIC HEART, RECIPIENT, ON PUMP OXYGENATOR, REMOVAL OF CARDIAC DEFIBRILLATOR AND LEAD;  Surgeon: Griselli, Massimo, MD;  [facility]    VITRECTOMY, PARS PLANA APPROACH, USING 27-GAUGE INSTRUMENTS Left 12/21/2020    Procedure: 27 gauge pars plana vitectomy, membrane peel, perfluoroctane liquid (PFO), retinectomy, endolaser, Silicone Oil 1000 cs;  Surgeon: Triny Bunch MD;  [facility]    ZZC CABG, ARTERY-VEIN, THREE  02/2008              Social History:     Social Drivers of Health     Food Insecurity: Low Risk  (5/12/2025)    Food Insecurity     Within the past 12 months, did you worry that your food would run out before you got money to buy more?: No     Within the past 12 months, did the food you bought just not last and you didn t have money to get more?: No   Depression: Not at risk (5/7/2025)    PHQ-2     PHQ-2 Score: 0   Housing Stability: Low Risk  (5/12/2025)    Housing Stability     Do you have housing? : Yes     Are you worried about losing your housing?: No   Tobacco Use: Low Risk  (5/9/2025)    Patient History     Smoking Tobacco Use: Never     Smokeless Tobacco Use: Never     Passive Exposure: Never   Financial Resource Strain: Low Risk  (5/12/2025)    Financial Resource Strain     Within the past 12 months, have you or your family members you live  with been unable to get utilities (heat, electricity) when it was really needed?: No   Alcohol Use: Not on file   Transportation Needs: Low Risk  (5/12/2025)    Transportation Needs     Within the past 12 months, has lack of transportation kept you from medical appointments, getting your medicines, non-medical meetings or appointments, work, or from getting things that you need?: No   Physical Activity: Inactive (12/16/2024)    Exercise Vital Sign     Days of Exercise per Week: 0 days     Minutes of Exercise per Session: 0 min   Interpersonal Safety: Low Risk  (5/12/2025)    Interpersonal Safety     Do you feel physically and emotionally safe where you currently live?: Yes     Within the past 12 months, have you been hit, slapped, kicked or otherwise physically hurt by someone?: No     Within the past 12 months, have you been humiliated or emotionally abused in other ways by your partner or ex-partner?: No   Stress: Stress Concern Present (12/16/2024)    [place] [employer] - Occupational Stress Questionnaire     Feeling of Stress : Rather much   Social Connections: Unknown (12/16/2024)    Social Connection and Isolation Panel [NHANES]     Frequency of Communication with Friends and Family: Not on file     Frequency of Social Gatherings with Friends and Family: More than three times a week     Attends [employer] Services: Not on file     Active Member of Clubs or Organizations: Not on file     Attends Club or Organization Meetings: Not on file     Marital Status: Not on file   Health Literacy: Not on file            Family History:   Patient's family history is reviewed today and is non-contributory  Family History   Problem Relation Age of Onset    Diabetes Sister     Diabetes Sister     Diabetes Brother     Macular Degeneration No family hx of     Glaucoma No family hx of     Myocardial Infarction No family hx of     Kidney Disease No family hx of     Anesthesia Reaction No family hx of      Bleeding Disorder No family hx of     Venous thrombosis No family hx of              Allergies:   Reviewed and edited as appropriate     Allergies   Allergen Reactions    Heparin Heparin Induced Thrombocytopenia     HIT screen sent 7/18/2020. CORRINE confirmed positive            Medications:     Current Facility-Administered Medications   Medication Dose Route Frequency Provider Last Rate Last Admin    acetaminophen (TYLENOL) tablet 975 mg  975 mg Oral TID Olga Denney PA   975 mg at 05/12/25 0755    aspirin (ASA) chewable tablet 81 mg  81 mg Oral Daily Olga Denney PA   81 mg at 05/12/25 0755    benzocaine-menthol (CHLORASEPTIC MAX) 15-10 MG lozenge 1 lozenge  1 lozenge Buccal Q1H PRN Olga Denney PA        calcium carbonate (TUMS) chewable tablet 1,000 mg  1,000 mg Oral 4x Daily PRN Olga Denney PA        calcium carbonate-vitamin D (CALTRATE) 600-10 MG-MCG per tablet 1 tablet  1 tablet Oral BID w/meals Olga Denney PA   1 tablet at 05/12/25 0755    glucose gel 15-30 g  15-30 g Oral Q15 Min PRN Olga Denney PA        Or    dextrose 50 % injection 25-50 mL  25-50 mL Intravenous Q15 Min PRN Olga Denney PA        Or    glucagon injection 1 mg  1 mg Subcutaneous Q15 Min PRN Olga Denney PA        ferrous sulfate (FEROSUL) tablet 325 mg  325 mg Oral BID w/meals Olga Denney PA   325 mg at 05/12/25 0755    insulin aspart (NovoLOG) injection (RAPID ACTING)  1-7 Units Subcutaneous TID AC Olga Denney PA   2 Units at 05/12/25 1237    insulin aspart (NovoLOG) injection (RAPID ACTING)  1-5 Units Subcutaneous At Bedtime Olga Denney PA   1 Units at 05/10/25 2234    latanoprost (XALATAN) 0.005 % ophthalmic solution 1 drop  1 drop Both Eyes QPM Olga Denney PA   1 drop at 05/11/25 2030    lidocaine (LMX4) cream   Topical Q1H PRN Olga Denney PA        lidocaine 1 % 0.1-1 mL  0.1-1 mL Other Q1H PRN Олег, Olga M, PA        [Held by provider]  lisinopril (ZESTRIL) tablet 5 mg  5 mg Oral Daily Olga Denney PA   5 mg at 05/10/25 0944    loperamide (IMODIUM) capsule 2 mg  2 mg Oral TID Cayetano Corona PA-C   2 mg at 05/12/25 1030    magnesium chloride CR tablet 535 mg  535 mg Oral Daily Cayetano Corona PA-C        mycophenolate (GENERIC EQUIVALENT) capsule 750 mg  750 mg Oral BID IS Castro Roberts MD   750 mg at 05/12/25 0755    ondansetron (ZOFRAN ODT) ODT tab 4 mg  4 mg Oral Q6H PRN Chucho Coreas PA-C        ondansetron (ZOFRAN) injection 4 mg  4 mg Intravenous Q6H PRN Chucho Coreas PA-C   4 mg at 05/11/25 1843    prochlorperazine (COMPAZINE) injection 5 mg  5 mg Intravenous Q6H PRN Chucho Coreas PA-C        Or    prochlorperazine (COMPAZINE) tablet 5 mg  5 mg Oral Q6H PRN Chucho Coreas PA-C        Or    prochlorperazine (COMPAZINE) suppository 12.5 mg  12.5 mg Rectal Q12H PRN Chucho Coreas PA-C        rosuvastatin (CRESTOR) tablet 10 mg  10 mg Oral Daily Olga Denney PA   10 mg at 05/12/25 0755    sirolimus (GENERIC EQUIVALENT) tablet 3 mg  3 mg Oral Daily Olga Denney PA   3 mg at 05/12/25 0755    sodium bicarbonate 150 mEq in sterile water (preservative free) 1,000 mL infusion   Intravenous Continuous Justice Paiz MD 50 mL/hr at 05/12/25 1224 New Bag at 05/12/25 1224    sodium chloride (PF) 0.9% PF flush 3 mL  3 mL Intracatheter Q8H MUSA Olga Denney PA   3 mL at 05/11/25 1403    sodium chloride (PF) 0.9% PF flush 3 mL  3 mL Intracatheter q1 min prn Olga Denney PA   3 mL at 05/11/25 1557    [Held by provider] sulfamethoxazole-trimethoprim (BACTRIM) 400-80 MG per tablet 1 tablet  1 tablet Oral Once per day on Monday Wednesday Friday Olga Denney PA        tamsulosin (FLOMAX) capsule 0.4 mg  0.4 mg Oral QAM Olga Denney PA   0.4 mg at 05/12/25 0755    traMADol (ULTRAM) half-tab 25 mg  25 mg Oral Q6H PRN Olga Denney PA   25 mg at 05/11/25 2029             Review of Systems:     A  complete review of systems was performed and is negative except as noted in the HPI           Physical Exam:   Temp: 97.9  F (36.6  C) Temp src: Oral BP: 107/55 Pulse: 86   Resp: 16 SpO2: 100 % O2 Device: None (Room air)    Wt:   Wt Readings from Last 2 Encounters:   05/09/25 70.8 kg (156 lb)   05/07/25 72.1 kg (159 lb)      General: Pleasant male in NAD.  Answers appropriately.    HEENT: Head is AT/NC. Sclera anicteric. No conjunctival injection.  Oropharynx is clear, moist and w/o exudate or lesions. No thrush.   Lungs: No increased work of breathing, speaking in full sentences, equal chest rise. On Room Air.   Heart: Regular rate. Peripheral perfusion intact.  Abdomen: Soft, non-tender, non-distended.  No rebound or peritoneal signs. No hepatosplenomegaly. No overt sx ascites.  Extremities: WWP, no pedal edema.  MSK: no gross deformity, mild-moderate muscle wasting  Skin: No jaundice or rash  Neurologic: Grossly non-focal.  CN 2-12 grossly intact.   Psych: mood appropriate to situation           Data:   Labs and imaging below were independently reviewed and interpreted    LAB WORK:    BMP  Recent Labs   Lab 05/12/25  1229 05/12/25  0754 05/12/25  0335 05/12/25  0131 05/11/25  2109 05/11/25  2022 05/11/25  1819 05/11/25  1323 05/11/25  0825 05/11/25  0635 05/10/25  1202 05/10/25  0931 05/09/25  2147 05/09/25  1625   NA  --   --  135  --   --   --   --   --   --  131*  --  129*  --  128*   POTASSIUM  --   --  3.5  3.5  --   --  3.2*  --  3.4  --  3.0*   < > 2.8*   < > 2.6*   CHLORIDE  --   --  96*  --   --   --   --   --   --  98  --  99  --  92*   BRYANNA  --   --  8.3*  --   --   --   --   --   --  8.4*  --  8.5*  --  8.6*   CO2  --   --  23  --   --   --   --   --   --  16*  --  11*  --  13*   BUN  --   --  104.0*  --   --   --   --   --   --  105.0*  --  98.6*  --  93.8*   CR  --   --  3.27*  --   --   --   --   --   --  4.00*  --  4.50*  --  4.88*   * 96 113* 130*   < >  --    < >  --    < > 144*   < >  203*   < > 194*    < > = values in this interval not displayed.     CBC  Recent Labs   Lab 05/12/25  0335 05/11/25  0635 05/10/25  0931 05/09/25  1625   WBC 7.8 8.0 9.2 11.5*   RBC 3.33* 3.65* 3.73* 4.10*   HGB 8.9* 10.0* 10.1* 11.0*   HCT 26.2* 28.8* 29.5* 32.9*   MCV 79 79 79 80   MCH 26.7 27.4 27.1 26.8   MCHC 34.0 34.7 34.2 33.4   RDW 15.8* 15.8* 15.9* 16.0*    249 272 323     INRNo lab results found in last 7 days.  LFTs  Recent Labs   Lab 05/09/25  1625   ALKPHOS 174*   AST 13   ALT 9   BILITOTAL 0.3   PROTTOTAL 7.7   ALBUMIN 4.6      PANCNo lab results found in last 7 days.    IMAGING:  (Personally reviewed)  CT Abdomen Pelvis w/o Contrast (05/09/2025 4:43 PM) - 1. Fluid-filled colon which is nonspecific but can be seen with enterocolitis.        =======================================================================

## 2025-05-12 NOTE — PLAN OF CARE
Goal Outcome Evaluation:    /56 (BP Location: Left arm)   Pulse 87   Temp 98.3  F (36.8  C) (Oral)   Resp 18   SpO2 98%     Back pain 2/10, managed with scheduled Tylenol. BG 96, 192, and 220. PIV infusing sodium bicarbonate at 50 ml/hr.   Patient had 5 loose stools. Saving urine and stool. For most of the day, patient tolerated regular diet. Clear diet starting at 4 pm for bowel prep -  Plan for Colonoscopy/Upper endoscopy Wednesday 5/14. Up independently. Commode at the bedside. Patient endorsed soreness on his bottom, encouraged using barrier cream and dry wipes instead of toilet paper. Wife at the bedside. Need to collect stool sample, will update upcoming nurse.               Overall Patient Progress: no changeOverall Patient Progress: no change    Outcome Evaluation: Denies nausea. Imodium scheduled 3 x's daily.

## 2025-05-12 NOTE — PROGRESS NOTES
[facility]    Medicine Progress Note - Hospitalist Service, GOLD TEAM 6    Date of Admission:  5/9/2025    Assessment & Plan   Lucian Oliveira is a 69 year old male with history of ischemic cardiomyopathy s/p OHT (2020), HTN, HLD, pulmonary HTN, CHANTEL, DM2, and CKD who was admitted to [facility] with DAYA in setting of acute diarrhea of unclear etiology.     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 No associated fevers, chills, N/V, or abdominal pain. Negative exposure history. Outpatient C.diff PCR negative 5/9. Presented to ED for further evaluation. Afebrile. VSS. WBC 11.5. CT A/P with fluid-filled colon which is non-specific but could represent enterocolitis. Enteric panel negative 5/10. CMV and EBV PCR negative. ID consulted, currently no clear source of infection. Suspect MMF-induced enterocolitis. Cardiology reduced MMF dose on 5/10. No improvement. High risk for ongoing kidney injury due to volume depletion. On Mag Oxide since 1/2024, which can contribute to ongoing diarrhea, but unlikely cause of acute symptoms.   - Cardiology requests colonoscopy to further evaluate  - GI consult for colonoscopy  - Transplant ID following, no additional recs  - Start scheduled imodium 2mg TID  - Switch Mg supplement to to Mag Chloride  - Monitor stool output  - Encourage PO hydration  - MIVF per nephrology  - CBC, BMP in AM    DAYA on CKD stage 3b:  Cr 4.88 on arrival (baseline 1.8-2.2). UA negative. Hyaline casts on urine micro and urine Na < 20 suggests hypovolemia. No evidence of hydro on recent CT. Renal function improved w IVF, however diarrhea persists. Repeat Cr 3.27 today. . No uremic symptoms. See below  re: AGMA.   - Nephrology following, appreciate ongoing recommendations  - Fluids per nephrology  - Avoid nephrotoxins  - Holding PTA lasix, jardiance, lisinopril and bactrim  - Repeat BMP in AM    Hyponatremia:  Na 128 on arrival. Urine Na < 20. Likely volume depletion in setting of diarrhea. Na improved to 135 with IV fluids.    - BMP in AM    Hypokalemia:  K 2.6 on arrival. Likely secondary to diarrhea, now complicated by treatment of metabolic acidosis causing intracellular shift. Repeat K 3.5 today.  - Continue RN replacement protocol  - BMP in AM    Anion gap metabolic acidosis:  Possibly multifactorial with GI bicarb loss, uremia, and DAYA/CKD. Resolved with bicarb drip. CO2 23 on BMP today.  - Nephrology consulted  - Continue sodium bicarb infusion at 50 ml/h (will defer to nephrology)  - Repeat BMP in AM    Hx ICM s/p OHT (2020):  No acute concerns. 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   - Continue reduced of MMF 750mg BID (50% home dose)  - Sirolimus 3mg daily  - Holding Bactrim prophylaxis d/t DAYA  - Transplant Cardiology following, appreciate recommendations  - ? Transition to Myfortic if not improving    Type II NSTEMI, demand ischemia:  Trop mildly elevated but down-trending. Suspect demand ischemia in setting of severe dehydration and DAYA. No chest pain. EKG negative.  - Monitor    Chronic Normocytic Anemia:  Hgb at baseline on admission. No evidence of bleeding. Repeat Hgb 8.9 today. Suspect dilutional effect from IVF.  - Monitor clinically  - CBC in AM    DM Type II:  on tresiba and jardiance at home.  - holding jardiance d/t DAYA  - Cover with LSSI  - Hypoglyemia protocol    BPH:    - Continue PTA flomax             Diet: Combination Diet Regular Diet Adult    DVT Prophylaxis: Pneumatic Compression Devices  Calderon Catheter: Not present  Lines: None     Cardiac Monitoring: None  Code Status: Full Code      Clinically Significant Risk Factors        # Hypokalemia: Lowest K = 3 mmol/L in last 2  "days, will replace as needed  # Hyponatremia: Lowest Na = 131 mmol/L in last 2 days, will monitor as appropriate  # Hypochloremia: Lowest Cl = 96 mmol/L in last 2 days, will monitor as appropriate          # Hypertension: Noted on problem list    # Chronic heart failure with preserved ejection fraction: heart failure noted on problem list and last echo with EF >50%          # DMII: A1C = 7.1 % (Ref range: <=5.7 %) within past 6 months   # Overweight: Estimated body mass index is 25.18 kg/m  as calculated from the following:    Height as of an earlier encounter on 5/9/25: 1.676 m (5' 6\").    Weight as of an earlier encounter on 5/9/25: 70.8 kg (156 lb)., PRESENT ON ADMISSION            Social Drivers of Health    Physical Activity: Inactive (12/16/2024)    Exercise Vital Sign     Days of Exercise per Week: 0 days     Minutes of Exercise per Session: 0 min   Stress: Stress Concern Present (12/16/2024)    [place] [employer] - Occupational Stress Questionnaire     Feeling of Stress : Rather much   Social Connections: Unknown (12/16/2024)    Social Connection and Isolation Panel [NHANES]     Frequency of Social Gatherings with Friends and Family: More than three times a week          Disposition Plan     Medically Ready for Discharge: Anticipated in 2-4 Days           The patient's care was discussed with the Attending Physician, Dr. Kennedy and Patient.    Cayetano Corona PA-C  Hospitalist Service, [address]  Securely message with [employer] (more info)  Text page via [facility] Paging/Directory   See signed in provider for up to date coverage information  ______________________________________________________________________    Interval History   Continues to have frequent loose stools. No improvement since admission despite otherwise feeling better. No fevers, chills, N/V, or abdominal pain. No new complaints.     Physical Exam   Vital Signs: " Temp: 97.9  F (36.6  C) Temp src: Oral BP: 107/55 Pulse: 86   Resp: 16 SpO2: 100 % O2 Device: None (Room air)    Weight: 0 lbs 0 oz    General Appearance:  Awake. Alert. Oriented x3. NAD.   HEENT:  Unremarkable.   Respiratory:  Normal effort. CTAB. No wheezing, rhonchi, rales.   Cardiovascular:  RRR. S1/S2. No murmurs.   GI:  Soft but slightly distended, non-tender, +NS  Extremity:  No pitting edema.   Skin:  No visible rash.   Neuro:  Grossly non-focal.      Medical Decision Making       50 MINUTES SPENT BY ME on the date of service doing chart review, history, exam, documentation & further activities per the note.      Data     I have personally reviewed the following data over the past 24 hrs:    7.8  \   8.9 (L)   / 213     135 96 (L) 104.0 (H) /  96   3.5; 3.5 23 3.27 (H) \       Imaging results reviewed over the past 24 hrs:   No results found for this or any previous visit (from the past 24 hours).

## 2025-05-12 NOTE — PROGRESS NOTES
Nephrology Progress Note  05/12/2025           MEDICAL DECISION MAKING     RECOMMENDATIONS:  Can switch bicarb gtt to LR  Cr improving markedly  Nephrology will continue to follow peripherally    ASSESSMENT:  Lucian Oliveira is a 69 year old ICM s/p OHT (2020), T2DM, CKD, HTN, admitted for diarrhea and DAYA    DAYA on CKD 3b  sCr on admit 4.88.  Baseline sCr 2.25  UA: minimal protein, MANY hyaline casts  Imaging: no hydro on CT a/p  sirolimus at goal  Likely 2/2 hypovolemia in setting of significant diarrhea    Anemia: hgb 10.0  Volume/HTN: BP OK, appears dry  Acidosis: bicarb 16  MBD: Ca  8.4    Metabolic acidosis   Hypokalemia   Most driven by GI losses    #Diarrhea, acute   #Ischemic Cardiomyopathy s/p OHT 7/2020    Recommendations were communicated to primary team via note     Seen and discussed with Dr. Sajan Cooper MD     SUBJECTIVE     Interval History :   Nursing and provider notes from last 24 hours reviewed.  In the last 24 hours Lucian Oliveira     Still having quite a bit of loose stool    A comprehensive review of systems was negative except as noted above.    OBJECTIVE     Physical Exam:   I/O last 3 completed shifts:  In: 2655 [P.O.:1140; I.V.:1515]  Out: 1050 [Urine:925; Emesis/NG output:125]   /59   Pulse 87   Temp 97.6  F (36.4  C) (Oral)   Resp 16   SpO2 99%      General: up in bed   HEENT: mmm   Pulmonary: unlabored  Extremities: no LE edema     Labs:   All labs reviewed by me  Electrolytes/Renal -   Recent Labs   Lab Test 05/12/25  1229 05/12/25  0754 05/12/25  0335 05/11/25  2109 05/11/25  2022 05/11/25  1819 05/11/25  1323 05/11/25  0825 05/11/25  0635 05/10/25  1202 05/10/25  0931 05/09/25  2147 05/09/25  1625 12/05/24  1159 10/08/24  0937 07/22/24  0711 04/16/24  1033   NA  --   --  135  --   --   --   --   --  131*  --  129*  --  128* 137  --  141 138   POTASSIUM  --   --  3.5  3.5  --  3.2*  --  3.4  --  3.0*   < > 2.8*   < > 2.6* 4.4  --  3.6  4.8   CHLORIDE  --   --  96*  --   --   --   --   --  98  --  99  --  92* 99  --  105 104   CO2  --   --  23  --   --   --   --   --  16*  --  11*  --  13* 28  --  26 23   BUN  --   --  104.0*  --   --   --   --   --  105.0*  --  98.6*  --  93.8* 30.5*  --  34.8* 34.1*   CR  --   --  3.27*  --   --   --   --   --  4.00*  --  4.50*  --  4.88* 2.35*  --  2.25* 2.22*   * 96 113*   < >  --    < >  --    < > 144*   < > 203*   < > 194* 103*   < > 99 97   BRYANNA  --   --  8.3*  --   --   --   --   --  8.4*  --  8.5*  --  8.6* 9.5  --  8.9 8.8   MAG  --   --   --   --   --   --   --   --   --   --   --   --  1.7  --   --  2.2 2.4*   PHOS  --   --   --   --   --   --   --   --   --   --   --   --   --  3.7  --  3.1 3.0    < > = values in this interval not displayed.       CBC -   Recent Labs   Lab Test 05/12/25  0335 05/11/25  0635 05/10/25  0931   WBC 7.8 8.0 9.2   HGB 8.9* 10.0* 10.1*    249 272       LFTs -   Recent Labs   Lab Test 05/09/25  1625 12/05/24  1159 07/22/24  0711 04/16/24  1033   ALKPHOS 174*  --  119 110   BILITOTAL 0.3  --  0.3 0.3   ALT 9  --  8 9   AST 13  --  15 15   PROTTOTAL 7.7  --  6.6 7.0   ALBUMIN 4.6 4.0 3.8 4.0       Iron Panel -   Recent Labs   Lab Test 12/05/24  1159 07/22/24  0711 04/29/24  0736   IRON 44* 52* 52*   IRONSAT 18 26 26   SEVERIANO [phone]       Current Medications:  Current Facility-Administered Medications   Medication Dose Route Frequency Provider Last Rate Last Admin    acetaminophen (TYLENOL) tablet 975 mg  975 mg Oral TID Olga Denney PA   975 mg at 05/12/25 1423    aspirin (ASA) chewable tablet 81 mg  81 mg Oral Daily Olga Denney PA   81 mg at 05/12/25 0755    calcium carbonate-vitamin D (CALTRATE) 600-10 MG-MCG per tablet 1 tablet  1 tablet Oral BID w/meals Olga Denney PA   1 tablet at 05/12/25 0755    ferrous sulfate (FEROSUL) tablet 325 mg  325 mg Oral BID w/meals Olga Denney PA   325 mg at 05/12/25 0755    insulin aspart (NovoLOG)  injection (RAPID ACTING)  1-7 Units Subcutaneous TID AC Olga Denney PA   2 Units at 05/12/25 1237    insulin aspart (NovoLOG) injection (RAPID ACTING)  1-5 Units Subcutaneous At Bedtime Olga Denney PA   1 Units at 05/10/25 2234    latanoprost (XALATAN) 0.005 % ophthalmic solution 1 drop  1 drop Both Eyes QPM Olga Denney PA   1 drop at 05/11/25 2030    [Held by provider] lisinopril (ZESTRIL) tablet 5 mg  5 mg Oral Daily ОлегOlga portillo PA   5 mg at 05/10/25 0944    loperamide (IMODIUM) capsule 2 mg  2 mg Oral TID Cayetano Corona PA-C   2 mg at 05/12/25 1423    magnesium chloride CR tablet 535 mg  535 mg Oral Daily Cayetano Corona PA-C   535 mg at 05/12/25 1423    multivitamin w/minerals (THERA-VIT-M) tablet 1 tablet  1 tablet Oral Daily Linda Martinez PA-C        mycophenolate (GENERIC EQUIVALENT) capsule 750 mg  750 mg Oral BID IS Castro Roberts MD   750 mg at 05/12/25 0755    rosuvastatin (CRESTOR) tablet 10 mg  10 mg Oral Daily Olga Denney PA   10 mg at 05/12/25 0755    sirolimus (GENERIC EQUIVALENT) tablet 3 mg  3 mg Oral Daily Olga Denney PA   3 mg at 05/12/25 0755    sodium chloride (PF) 0.9% PF flush 3 mL  3 mL Intracatheter Q8H MUSA Olga Denney PA   3 mL at 05/12/25 1444    [Held by provider] sulfamethoxazole-trimethoprim (BACTRIM) 400-80 MG per tablet 1 tablet  1 tablet Oral Once per day on Monday Wednesday Friday Olga Denney PA        tamsulosin (FLOMAX) capsule 0.4 mg  0.4 mg Oral QAM Olga Denney PA   0.4 mg at 05/12/25 0755     Current Facility-Administered Medications   Medication Dose Route Frequency Provider Last Rate Last Admin    sodium bicarbonate 150 mEq in sterile water (preservative free) 1,000 mL infusion   Intravenous Continuous Justice Paiz MD 50 mL/hr at 05/12/25 1450 Restarted at 05/12/25 1450     Brendan Cooper MD

## 2025-05-12 NOTE — PROGRESS NOTES
[facility] [address] [address]  Phone: [phone]    Patient:  Lucian Oliveira, Date of birth 1955  MRN: [id number]  5/9/2025  Date of Visit:  05/12/2025  Referring Provider Madisyn Kennedy  Reason for consult: Diarrhea      Assessment & Plan    Recommendations:   Recommend a colonoscopy to assess etiology of diarrhea including Mycophenolate - induced colitis. Last colonoscopy 10/2024 w/ poor prep. One consideration if his renal function improves is to perform colonoscopy as an outpatient.   CMV, EMV, Enteric pathogen panel, C diff negative. Okay to use imodium from an ID perspective.   My suspicion is low for a parasitic illness but okay to send stool O&P and Microsporidia. Cryptosporidium and Giardia negative on enteric pathogen panel.   If he remains in house will continue to follow to see colonoscopy results.     Alicia Adkins D.O.   Infectious Diseases  Contact information available via [facility] Paging/Directory       Assessment:  69 y/o male with a history of OHT 7/19/2020, HIT s/p PLEX, CKD, Type II DM, vitreous hemorrhage, Norovirus and EAEC (2024) who developed acute onset of diarrhea on 5/2.      Diarrhea, stable: acute onset 5/2 with liquid stools, 6-7 per day. C diff, enteric pathogen panel, CMV, EBV negative. No unusual exposures to uncooked food, untreated water, travel, or sick contacts. CMV D+/R+ - no history of CMV reactivation. 5/9 CT without small bowel wall thickness, diverticulitis, cholecystitis, or intraabdominal process. It does mention fluid filled colon but this is not specific, radiologist suggests enterocolitis. Last colonoscopy was in 10/2024 and this needs to be repeated due to poor prep. Since admission bowel movements have been about the same but he does not have abdominal pan, fevers,leukocytosis. IgG normal. At this point given negative infectious work up recommend obtain a colonoscopy to assess etiology of  diarrhea including Mycophenolate - induced colitis.     DAYA, improved: likely due to diarrhea. Nephrology is following.      Transplant check list:  Current immunosuppression: sirolimus, mycophenolate (dose reduced by cards)  Viral serostatus: CMV D+/R+, EBV D+/R+, HSV 1+/2-, VZV +  Other serostatus: Toxoplasma D+/R-, on bactrim  Fungal Prophylaxis: none  Bacterial prophylaxis: none  PJP prophylaxis: tmp/smx  Immunizations:RSV vaccine candidate  IgG:10/12/23 668  Qtc: 442 ms on 5/9/25     History of Present Illness   Pertinent history obtain from: chart review and patient  Endorses having 6 liquid stools yesterday and 2 so far today. Eating and drinking okay. No abdominal pain, fevers, chills.   No leukocytosis, DAYA improved. EBV and CMV negative. 5/9/25 IgG level 758.     Physical Exam     Vital signs:  /51 (BP Location: Left arm, Patient Position: Semi-Borja's)   Pulse 95   Temp 97.8  F (36.6  C) (Oral)   Resp 16   SpO2 99%     GENERAL: no distress, pleasant, well appearing, sitting in bed  NECK: no adenopathy  RESP: lungs clear to auscultation - no rales, rhonchi or wheezes, RA, no accessory muscle use  CV: regular rate and rhythm, normal S1 S2, no murmur, no peripheral edema  ABDOMEN: soft, nontender, non-tender, bowel sounds normal  MS: no gross musculoskeletal defects noted, no edema  NEURO: Alert, oriented, mormal strength and tone, mentation intact and speech normal    Data   Laboratory data and imaging listed below was reviewed prior to this encounter.     Microbiology:    Culture   Date Value Ref Range Status   10/11/2023 No Growth  Final   10/11/2023 No Growth  Final   , Hematology Studies:    Recent Labs   Lab Test 05/12/25  0335 05/11/25  0635 05/10/25  0931 05/09/25  1625 12/05/24  1159 07/22/24  0711 04/29/24  0736 04/16/24  1033 11/17/23  0556 11/16/23  1914 11/16/23  1022 11/08/23  1053 11/08/23  0908   WBC 7.8 8.0 9.2 11.5* 7.7 6.6  --  3.7*   < > 4.2 3.4*  --  4.1   ANEU  --   --   --   9.8*  --  4.7  --  2.2  --  3.0 2.2  --  2.4   AEOS  --   --   --  0.0  --  0.1  --  0.1  --  0.1 0.0  --  0.0   HGB 8.9* 10.0* 10.1* 11.0* 10.5* 10.7*   < > 10.2*   < > 5.8* 6.4*   < > 6.9*   MCV 79 79 79 80 89 91  --  90   < > 89 87  --  84    249 272 323 230 219  --  188   < > 165 146*  --  150    < > = values in this interval not displayed.    , Metabolic Studies:   Recent Labs   Lab Test 05/12/25  0335 05/11/25 2022 05/11/25  1323 05/11/25  0635 05/11/25  0000 05/10/25  1841 05/10/25  0931 05/09/25  2314 05/09/25  1625 12/05/24  1159     --   --  131*  --   --  129*  --  128* 137   POTASSIUM 3.5  3.5 3.2* 3.4 3.0* 3.1*   < > 2.8*   < > 2.6* 4.4   CHLORIDE 96*  --   --  98  --   --  99  --  92* 99   CO2 23  --   --  16*  --   --  11*  --  13* 28   .0*  --   --  105.0*  --   --  98.6*  --  93.8* 30.5*   CR 3.27*  --   --  4.00*  --   --  4.50*  --  4.88* 2.35*   GFRESTIMATED 19*  --   --  15*  --   --  13*  --  12* 29*    < > = values in this interval not displayed.   , Hepatic Studies:   Recent Labs   Lab Test 05/09/25  1625 12/05/24  1159 07/22/24  0711 04/16/24  1033 01/15/24  1511 11/16/23  1914 10/25/23  1005 10/11/23  1128 10/10/23  1011 07/17/23  0908   BILITOTAL 0.3  --  0.3 0.3  --  0.2 0.2 0.2  --  0.3   ALKPHOS 174*  --  119 110  --  129 138* 124  --  122   ALBUMIN 4.6 4.0 3.8 4.0 3.6 3.6 3.7 4.0   < > 4.0   AST 13  --  15 15  --   --  17 22  --  24   ALT 9  --  8 9  --   --  13 20  --  17    < > = values in this interval not displayed.   , HSV 1/2 IgG:   Recent Labs   Lab Test 07/08/19  1021   H1IGG >8.0*   H2IGG <0.2    and VZV IgG:   Recent Labs   Lab Test 07/08/19  1021   VZVIGG 7.6*   , CMV IgG:   Recent Labs   Lab Test 07/19/20  1613 07/08/19  1021   CMVIGG >8.0* >8.0*   , and EBV VCA:   Recent Labs   Lab Test 07/19/20  1613 07/08/19  1021   EBVCAG 3.2* 2.4*

## 2025-05-12 NOTE — PROGRESS NOTES
[facility]    Medicine Progress Note - Hospitalist Service, GOLD TEAM 6    Date of Admission:  5/9/2025    Assessment & Plan   Lucian Oliveira is a 69 year old male with a past medical history of ischemic cardiomyopathy (S/P OHT 2020), HTN, HLD, pHTN, CHANTEL, T2DM, and CKD. He initially presented to the ED for evaluation of diarrhea, and was found to have an DAYA. He was subsequently admitted given diarrhea of unclear etiology c/b DAYA. Remains admitted for further monitoring and management.     Acute Diarrhea, possibly medication-induced  Pt notes onset of diarrhea on 5/2/25 without any associated fevers, abdominal pain, nausea, or vomiting. No known recent sick exposures. Outpatient C diff PCR on 5/9/25 was negative. Presented to the ED where he has since been afebrile, VSS, and not leukocytotic. CT A/P showed fluid-filled colon which is non-specific, but was suggestive of possible enterocolitis. Enteric panel negative on 5/10. CMV, EBV PCRs negative. ID consulted, and no clear infectious source. 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 Cardiology then reduced MMF dose on 5/10, but has not yet had improvement in diarrhea. Has been on Mag Ox since 01/2024, which can contribute to diarrhea, so this was switched to an alternative agent as below.   - Cardiology requests colonoscopy to further evaluate  - GI consult for colonoscopy  - Transplant ID following, no additional recs  - Start scheduled imodium 2mg TID  - Switch Mg supplement to to Mag Chloride  - Monitor stool output  - Encourage PO hydration  - MIVF per nephrology  - CBC, BMP in AM     DAYA, suspect prerenal, improving  CKD Stage 3b  Cr 4.88 on arrival (baseline 1.8-2.2). UA negative. Hyaline casts on urine micro and urine Na < 20 suggests hypovolemia. No evidence of hydro on recent CT. Renal function improved w IVF, however diarrhea persists. Repeat Cr 2.45  today. BUN 85.3. No uremic symptoms. See below re: AGMA.   - Nephrology following, appreciate ongoing recommendations  - Fluids per nephrology  - Avoid nephrotoxins  - Holding PTA lasix, jardiance, lisinopril and bactrim  - Repeat BMP in AM     Hyponatremia, resolved  Na 128 on arrival. Urine Na < 20. Likely volume depletion in setting of diarrhea. Na improved to 135 with IV fluids and stable this AM.  - BMP in AM     Hypokalemia   K 2.6 on arrival. Likely secondary to diarrhea, now complicated by treatment of metabolic acidosis causing intracellular shift. Repeat K 3.0 today.  - Continue RN replacement protocol  - BMP in AM     Anion Gap Metabolic Acidosis, improving  Possibly multifactorial with GI bicarb loss, uremia, and DAYA/CKD. Resolved with bicarb drip. CO2 29 on BMP today.  - Nephrology consulted, appreciate assistance  - Sodium bicarb drip stopped today   - Trend BMP in AM     Hx ICM s/p OHT (2020)  No acute concerns. 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   - Continue reduced of MMF 750mg BID (50% home dose)  - Sirolimus 3mg daily  - Holding Bactrim prophylaxis d/t DAYA (will consider resuming in the coming day or two if his Cr returns to baseline [nearly there])  - Transplant Cardiology following, appreciate recommendations  - ? Transition to Myfortic if not improving     Type II NSTEMI, demand ischemia  Trop mildly elevated but down-trending. Suspect demand ischemia in setting of severe dehydration and DAYA. No chest pain. EKG negative.  - Monitor clinically  - No further trending of Trop necessary at this time unless clinically indicated     Chronic Normocytic Anemia, stable  Hgb at baseline on admission. No evidence of bleeding. Repeat Hgb 8.9 today. Suspect dilutional effect from IVF.  - Monitor clinically  - CBC in AM     Type 2 Diabetes Mellitus  PTA tresiba and jardiance at home.  - Holding PTA Jardiance d/t DAYA (will consider resuming in the coming day or two if his Cr returns to baseline  "[nearly there])  - Cover with Q4H medium sliding scale insulin for now (while NPO)   - Once able to tolerate PO intake again, can transition back to TID AC + at bedtime  - BG checks Q4H while NPO for pending procedure tomorrow   - Once able to tolerate PO intake again, can transition back to TID AC + at bedtime  - Hypoglyemia protocol     BPH  - Continue PTA flomax        Diet: Combination Diet Regular Diet Adult    DVT Prophylaxis: Pneumatic Compression Devices  Calderon Catheter: Not present  Lines: None     Cardiac Monitoring: None  Code Status: Full Code      Clinically Significant Risk Factors        # Hypokalemia: Lowest K = 3 mmol/L in last 2 days, will replace as needed  # Hyponatremia: Lowest Na = 131 mmol/L in last 2 days, will monitor as appropriate  # Hypochloremia: Lowest Cl = 96 mmol/L in last 2 days, will monitor as appropriate          # Hypertension: Noted on problem list  # Chronic heart failure with preserved ejection fraction: heart failure noted on problem list and last echo with EF >50%          # DMII: A1C = 7.1 % (Ref range: <=5.7 %) within past 6 months   # Overweight: Estimated body mass index is 25.18 kg/m  as calculated from the following:    Height as of an earlier encounter on 5/9/25: 1.676 m (5' 6\").    Weight as of an earlier encounter on 5/9/25: 70.8 kg (156 lb)., PRESENT ON ADMISSION            Social Drivers of Health    Physical Activity: Inactive (12/16/2024)    Exercise Vital Sign     Days of Exercise per Week: 0 days     Minutes of Exercise per Session: 0 min   Stress: Stress Concern Present (12/16/2024)    [employer] - Occupational Stress Questionnaire     Feeling of Stress : Rather much   Social Connections: Unknown (12/16/2024)    Social Connection and Isolation Panel [NHANES]     Frequency of Social Gatherings with Friends and Family: More than three times a week          Disposition Plan     Medically Ready for Discharge: Anticipated in 2-4 " "Days       The patient's care was discussed with the Attending Physician, Dr. Lily Bucio, Bedside Nurse, Patient, and GI,  Consultant(s).    Sang Waldron PA-C  Hospitalist Service, GOLD TEAM [address] [place] [address]  Securely message with [employer] (more info)  Text page via [facility] Paging/Directory   See signed in provider for up to date coverage information  ______________________________________________________________________    Interval History   Pt seen in his room this AM. He jokes about his bowel prep and how much he is needing \"to go\" from this. He otherwise denies abdominal pain, nausea, vomiting, urinary changes, chest pain, dyspnea, lightheadedness, BLE pain or swelling.2    Physical Exam   Vital Signs: Temp: 97.6  F (36.4  C) Temp src: Oral BP: 112/59 Pulse: 87   Resp: 16 SpO2: 99 % O2 Device: None (Room air)    Weight: 0 lbs 0 oz    Constitutional: awake, alert, cooperative, no apparent distress, and appears stated age. Sitting upright at edge of bed.2  Eyes: lids and lashes normal, sclera clear, and conjunctiva normal  ENT: normocephalic, without obvious abnormality  Respiratory: No increased work of breathing, good air exchange, clear to auscultation bilaterally, no crackles or wheezing  Cardiovascular: regular rate and rhythm, normal S1 and S2, no S3, no S4, and no murmur noted  GI: normal bowel sounds, soft, non-distended, and non-tender  Skin: no bruising or bleeding, no redness, warmth, or swelling, no rashes, and no lesions on visualized skin.   Musculoskeletal: no lower extremity pitting edema present, no deformities, no calf tenderness.  Neurologic: Moving all extremities equally and spontaneously. No obvious focal neuro deficits.  Neuropsychiatric: General: normal, calm, and normal eye contact  Level of consciousness: alert / normal  Affect: normal and pleasant  Memory and insight: normal, memory for past and recent events intact, and thought process " normal    Medical Decision Making       80 MINUTES SPENT BY ME on the date of service doing chart review, history, exam, documentation & further activities per the note.      Data     I have personally reviewed the following data over the past 24 hrs:    6.4  \   8.9 (L)   / 206     141 99 85.3 (H) /  129 (H)   3.0 (L) 29 2.45 (H) \     ALT: 9 AST: 17 AP: 114 TBILI: 0.2   ALB: 3.8 TOT PROTEIN: 6.1 (L) LIPASE: N/A       Imaging results reviewed over the past 24 hrs:   No results found for this or any previous visit (from the past 24 hours).  Recent Labs   Lab 05/13/25  0755 05/13/25  0601 05/13/25  0559 05/13/25  0151 05/12/25  0754 05/12/25  0335 05/11/25  2109 05/11/25 2022 05/11/25  0825 05/11/25  0635 05/09/25  2147 05/09/25  1625   WBC  --  6.4  --   --   --  7.8  --   --   --  8.0   < > 11.5*   HGB  --  8.9*  --   --   --  8.9*  --   --   --  10.0*   < > 11.0*   MCV  --  79  --   --   --  79  --   --   --  79   < > 80   PLT  --  206  --   --   --  213  --   --   --  249   < > 323   NA  --   --  141  --   --  135  --   --   --  131*   < > 128*   POTASSIUM  --   --  3.0*  --   --  3.5  3.5  --  3.2*   < > 3.0*   < > 2.6*   CHLORIDE  --   --  99  --   --  96*  --   --   --  98   < > 92*   CO2  --   --  29  --   --  23  --   --   --  16*   < > 13*   BUN  --   --  85.3*  --   --  104.0*  --   --   --  105.0*   < > 93.8*   CR  --   --  2.45*  --   --  3.27*  --   --   --  4.00*   < > 4.88*   ANIONGAP  --   --  13  --   --  16*  --   --   --  17*   < > 23*   BRYANNA  --   --  8.7*  --   --  8.3*  --   --   --  8.4*   < > 8.6*   *  --  156* 271*   < > 113*   < >  --    < > 144*   < > 194*   ALBUMIN  --   --  3.8  --   --   --   --   --   --   --   --  4.6   PROTTOTAL  --   --  6.1*  --   --   --   --   --   --   --   --  7.7   BILITOTAL  --   --  0.2  --   --   --   --   --   --   --   --  0.3   ALKPHOS  --   --  114  --   --   --   --   --   --   --   --  174*   ALT  --   --  9  --   --   --   --   --   --   --    --  9   AST  --   --  17  --   --   --   --   --   --   --   --  13    < > = values in this interval not displayed.

## 2025-05-12 NOTE — PROGRESS NOTES
Cardiology Progress Note    Date of Service: 05/12/2025  Patient: Lucian Oliveira  MRN: [id number]  Admission Date: 5/9/2025  Hospital Day: 3    Cardiology Problem List:  Ischemic Cardiomyopathy s/p OHT 7/19/2025  primary graft dysfunction  with recovered graft function  history of HIT status post Plex  Diabetes Mellitus, Type II  hypertension  hyperlipidemia  retinal detachment status post vitrectomy in December 2020  Pulmonary HTN, WHO class    Diarrhea, acute  DAYA  Hypokalemia  Hyponatremia    Assessment:   Lucian Oliveira is a 69 year old male with a past medical history significant for CAD s/p CABG in 2008, ICM and s/p orthotopic heart transplant in 7/2020, HTN, HLD, mild pulmonary HTN, CHANTEL, HIT s/p PLEX, CKD, Type II DM, vitreous hemorrhage, Norovirus and EAEC (2024 type 2 diabetes, and CKD stage 3, admitted 5/9/25 p/w diarrhea x 8 days with intermittent vomiting, mild abdominal pain and poor po intake, found to have DAYA, hypokalemia, hyponatremia and mild leukocytosis, with CT abdomen/pelvis showing mild enterocolitis. Cardiology heart failure team consulted for management of immunosuppression.     Patient reports he has had watery diarrhea for 8 days, 7-8 episodes per day without bleeding. Mild leukocytosis. Enteric pathogen panel negative and c-diff negative. CT A/P showing normal caliber small bowel, duodenal and jejunal diverticula w/o inflammation, cholelithiasis and fluid in colon possibly representing enterocolitis. Infectious work-up negative to date with CMV and EBV negative. Patient improving with semisolid stool this morning and no further diarrhea at this time. Unclear if improvement due to clearing of yet unidentified infectious cause versus decreasing MMF dose during this admission. At this point, recommend getting GI consult for colonoscopy with biopsy to assess for MMF toxicity as this will guide future immunosuppression treatment.      Serostatus:   Viral serostatus:  CMV D+/R+, EBV D+/R+, HSV 1+/2-, VZV +  Other serostatus: Toxoplasma D+/R-, on bactrim  Blood type: O positive     Immunosuppression:  -Calcineurin Inhibitor: Sirolimus 3 mg po daily, goal level 6-8  -Cell cycle Inhibitor: Mycophenolate 750 mg BID (PTA dose 1500 mg BID)  - No rejection history     Infection Prophylaxis:  Fungal Prophylaxis: none  Bacterial prophylaxis: none  PJP prophylaxis: tmp/smx  IgG:10/12/23 668  Qtc: 442 ms on 5/9/25     Recommendations:   Recommend GI consult for colonoscopy with biopsy while admitted to evaluate for MMF toxicity  Continue decreased mycophenolate dose of 750 mg BID  Continue Sirolimus 3 mg po daily  Transplant infectious disease following for cause of diarrhea, appreciate recs  CMV, EBV negative  We will continue to follow.  Rest of care per primary team.     Patient was seen and plan of care was discussed with attending physician Dr. Holder. Final recommendations pending Attending Attestation. Please don't hesitate to contact our team with any additional questions or concerns.     Mariano Patterson MD  Internal Medicine Resident, PGY-1    Subjective    Interval History:  No acute events overnight. Some nausea/vomiting yesterday afternoon, now resolved. Diarrhea improved, one bowel movement this morning. Denies abdominal pain/cramping, dizziness, CP, SOB.  No new complaints    Review of Systems:  A comprehensive ROS was performed and found to be negative or non-contributory with the exception of that noted in the HPI above.    Current Facility-Administered Medications   Medication Dose Route Frequency Provider Last Rate Last Admin    acetaminophen (TYLENOL) tablet 975 mg  975 mg Oral TID Olga Denney PA   975 mg at 05/12/25 0755    aspirin (ASA) chewable tablet 81 mg  81 mg Oral Daily Olga Denney PA   81 mg at 05/12/25 0755    benzocaine-menthol (CHLORASEPTIC MAX) 15-10 MG lozenge 1 lozenge  1 lozenge Buccal Q1H PRN Olga Denney PA        calcium carbonate  (TUMS) chewable tablet 1,000 mg  1,000 mg Oral 4x Daily PRN Olga Denney PA        calcium carbonate-vitamin D (CALTRATE) 600-10 MG-MCG per tablet 1 tablet  1 tablet Oral BID w/meals Olga Denney PA   1 tablet at 05/12/25 0755    glucose gel 15-30 g  15-30 g Oral Q15 Min PRN lOga Denney PA        Or    dextrose 50 % injection 25-50 mL  25-50 mL Intravenous Q15 Min PRN Olga Denney PA        Or    glucagon injection 1 mg  1 mg Subcutaneous Q15 Min PRN Olga Denney PA        ferrous sulfate (FEROSUL) tablet 325 mg  325 mg Oral BID w/meals Olga Denney PA   325 mg at 05/12/25 0755    insulin aspart (NovoLOG) injection (RAPID ACTING)  1-7 Units Subcutaneous TID AC Olga Denney PA   2 Units at 05/12/25 1237    insulin aspart (NovoLOG) injection (RAPID ACTING)  1-5 Units Subcutaneous At Bedtime Olga Denney PA   1 Units at 05/10/25 2234    latanoprost (XALATAN) 0.005 % ophthalmic solution 1 drop  1 drop Both Eyes QPM Olga Denney PA   1 drop at 05/11/25 2030    lidocaine (LMX4) cream   Topical Q1H PRN Olga Denney PA        lidocaine 1 % 0.1-1 mL  0.1-1 mL Other Q1H PRN Olga Denney PA        [Held by provider] lisinopril (ZESTRIL) tablet 5 mg  5 mg Oral Daily Olga Denney PA   5 mg at 05/10/25 0944    loperamide (IMODIUM) capsule 2 mg  2 mg Oral TID Cayetano Corona PA-C   2 mg at 05/12/25 1030    magnesium chloride CR tablet 535 mg  535 mg Oral Daily Cayetano Corona PA-C        mycophenolate (GENERIC EQUIVALENT) capsule 750 mg  750 mg Oral BID IS Castro Roberts MD   750 mg at 05/12/25 0755    ondansetron (ZOFRAN ODT) ODT tab 4 mg  4 mg Oral Q6H PRN Chucho Coreas PA-C        ondansetron (ZOFRAN) injection 4 mg  4 mg Intravenous Q6H PRN Chucho Coreas PA-C   4 mg at 05/11/25 1843    prochlorperazine (COMPAZINE) injection 5 mg  5 mg Intravenous Q6H PRN Chucho Coreas PA-C        Or    prochlorperazine (COMPAZINE) tablet 5 mg  5 mg Oral  Q6H PRN Chucho Coreas PA-C        Or    prochlorperazine (COMPAZINE) suppository 12.5 mg  12.5 mg Rectal Q12H PRN Chucho Coreas PA-C        rosuvastatin (CRESTOR) tablet 10 mg  10 mg Oral Daily ОлегOlga portillo PA   10 mg at 05/12/25 0755    sirolimus (GENERIC EQUIVALENT) tablet 3 mg  3 mg Oral Daily ОлегOlga portillo PA   3 mg at 05/12/25 0755    sodium bicarbonate 150 mEq in sterile water (preservative free) 1,000 mL infusion   Intravenous Continuous Justice Paiz MD 50 mL/hr at 05/12/25 1224 New Bag at 05/12/25 1224    sodium chloride (PF) 0.9% PF flush 3 mL  3 mL Intracatheter Q8H MUSA Olga Denney PA   3 mL at 05/11/25 1403    sodium chloride (PF) 0.9% PF flush 3 mL  3 mL Intracatheter q1 min prn Olga Denney PA   3 mL at 05/11/25 1557    [Held by provider] sulfamethoxazole-trimethoprim (BACTRIM) 400-80 MG per tablet 1 tablet  1 tablet Oral Once per day on Monday Wednesday Friday Olga Denney PA        tamsulosin (FLOMAX) capsule 0.4 mg  0.4 mg Oral QAM Olga Denney PA   0.4 mg at 05/12/25 0755    traMADol (ULTRAM) half-tab 25 mg  25 mg Oral Q6H PRN Olga Denney PA   25 mg at 05/11/25 2029     Objective   Physical Exam:    Blood pressure 107/55, pulse 86, temperature 97.9  F (36.6  C), temperature source Oral, resp. rate 16, SpO2 100%.    Exam:  General: NAD, resting comfortably in bed  HEENT: no scleral icterus or injection  CARDIAC: RRR, no murmurs. No JVD  RESP:  CTAB, no rales, wheezes or rhonchi or wheezes  GI: soft, nondistended, nontender, bowel sounds hypoactive,   : No villalobos  EXTREMITIES: no LE edema  SKIN: No acute lesions appreciated  NEURO: No focal deficits    Labs & Studies: I personally reviewed the following studies:  ROUTINE LABS (Last four results):  CMP  Recent Labs   Lab 05/12/25  1229 05/12/25  0754 05/12/25  0335 05/12/25  0131 05/11/25  2109 05/11/25  2022 05/11/25  1819 05/11/25  1323 05/11/25  0825 05/11/25  0635 05/10/25  1202 05/10/25  0931  05/09/25  2147 05/09/25  1625   NA  --   --  135  --   --   --   --   --   --  131*  --  129*  --  128*   POTASSIUM  --   --  3.5  3.5  --   --  3.2*  --  3.4  --  3.0*   < > 2.8*   < > 2.6*   CHLORIDE  --   --  96*  --   --   --   --   --   --  98  --  99  --  92*   CO2  --   --  23  --   --   --   --   --   --  16*  --  11*  --  13*   ANIONGAP  --   --  16*  --   --   --   --   --   --  17*  --  19*  --  23*   * 96 113* 130*   < >  --    < >  --    < > 144*   < > 203*   < > 194*   BUN  --   --  104.0*  --   --   --   --   --   --  105.0*  --  98.6*  --  93.8*   CR  --   --  3.27*  --   --   --   --   --   --  4.00*  --  4.50*  --  4.88*   GFRESTIMATED  --   --  19*  --   --   --   --   --   --  15*  --  13*  --  12*   BRYANNA  --   --  8.3*  --   --   --   --   --   --  8.4*  --  8.5*  --  8.6*   MAG  --   --   --   --   --   --   --   --   --   --   --   --   --  1.7   PROTTOTAL  --   --   --   --   --   --   --   --   --   --   --   --   --  7.7   ALBUMIN  --   --   --   --   --   --   --   --   --   --   --   --   --  4.6   BILITOTAL  --   --   --   --   --   --   --   --   --   --   --   --   --  0.3   ALKPHOS  --   --   --   --   --   --   --   --   --   --   --   --   --  174*   AST  --   --   --   --   --   --   --   --   --   --   --   --   --  13   ALT  --   --   --   --   --   --   --   --   --   --   --   --   --  9    < > = values in this interval not displayed.     CBC  Recent Labs   Lab 05/12/25  0335 05/11/25  0635 05/10/25  0931 05/09/25  1625   WBC 7.8 8.0 9.2 11.5*   RBC 3.33* 3.65* 3.73* 4.10*   HGB 8.9* 10.0* 10.1* 11.0*   HCT 26.2* 28.8* 29.5* 32.9*   MCV 79 79 79 80   MCH 26.7 27.4 27.1 26.8   MCHC 34.0 34.7 34.2 33.4   RDW 15.8* 15.8* 15.9* 16.0*    249 272 323     INRNo lab results found in last 7 days.    Imaging:  CT Abdomen Pelvis w/o Contrast   Final Result   IMPRESSION:    1. Fluid-filled colon which is nonspecific but can be seen with   enterocolitis. No additional acute  findings in the abdomen or pelvis.   Normal appendix.   2. Cholelithiasis without evidence of cholecystitis.   3. Duodenal and jejunal diverticula without evidence of   diverticulitis.      I have personally reviewed the examination and initial interpretation   and I agree with the findings.      NIKOLE DAVILA MD            SYSTEM ID:  [id number]

## 2025-05-13 ENCOUNTER — APPOINTMENT (OUTPATIENT)
Dept: INTERPRETER SERVICES | Facility: CLINIC | Age: 70
End: 2025-05-13
Payer: COMMERCIAL

## 2025-05-13 LAB
ALBUMIN SERPL BCG-MCNC: 3.8 G/DL (ref 3.5–5.2)
ALP SERPL-CCNC: 114 U/L (ref 40–150)
ALT SERPL W P-5'-P-CCNC: 9 U/L (ref 0–70)
ANION GAP SERPL CALCULATED.3IONS-SCNC: 13 MMOL/L (ref 7–15)
AST SERPL W P-5'-P-CCNC: 17 U/L (ref 0–45)
BILIRUB SERPL-MCNC: 0.2 MG/DL
BUN SERPL-MCNC: 85.3 MG/DL (ref 8–23)
CALCIUM SERPL-MCNC: 8.7 MG/DL (ref 8.8–10.4)
CHLORIDE SERPL-SCNC: 99 MMOL/L (ref 98–107)
CREAT SERPL-MCNC: 2.45 MG/DL (ref 0.67–1.17)
EGFRCR SERPLBLD CKD-EPI 2021: 27 ML/MIN/1.73M2
ERYTHROCYTE [DISTWIDTH] IN BLOOD BY AUTOMATED COUNT: 15.7 % (ref 10–15)
GLUCOSE BLDC GLUCOMTR-MCNC: 115 MG/DL (ref 70–99)
GLUCOSE BLDC GLUCOMTR-MCNC: 121 MG/DL (ref 70–99)
GLUCOSE BLDC GLUCOMTR-MCNC: 129 MG/DL (ref 70–99)
GLUCOSE BLDC GLUCOMTR-MCNC: 136 MG/DL (ref 70–99)
GLUCOSE BLDC GLUCOMTR-MCNC: 194 MG/DL (ref 70–99)
GLUCOSE BLDC GLUCOMTR-MCNC: 271 MG/DL (ref 70–99)
GLUCOSE SERPL-MCNC: 156 MG/DL (ref 70–99)
HCO3 SERPL-SCNC: 29 MMOL/L (ref 22–29)
HCT VFR BLD AUTO: 26.1 % (ref 40–53)
HGB BLD-MCNC: 8.9 G/DL (ref 13.3–17.7)
MCH RBC QN AUTO: 27 PG (ref 26.5–33)
MCHC RBC AUTO-ENTMCNC: 34.1 G/DL (ref 31.5–36.5)
MCV RBC AUTO: 79 FL (ref 78–100)
PLATELET # BLD AUTO: 206 10E3/UL (ref 150–450)
POTASSIUM SERPL-SCNC: 3 MMOL/L (ref 3.4–5.3)
POTASSIUM SERPL-SCNC: 3.3 MMOL/L (ref 3.4–5.3)
PROT SERPL-MCNC: 6.1 G/DL (ref 6.4–8.3)
RBC # BLD AUTO: 3.3 10E6/UL (ref 4.4–5.9)
SODIUM SERPL-SCNC: 141 MMOL/L (ref 135–145)
WBC # BLD AUTO: 6.4 10E3/UL (ref 4–11)

## 2025-05-13 PROCEDURE — 120N000011 HC R&B TRANSPLANT UMMC

## 2025-05-13 PROCEDURE — 250N000013 HC RX MED GY IP 250 OP 250 PS 637: Performed by: TRANSPLANT SURGERY

## 2025-05-13 PROCEDURE — 250N000013 HC RX MED GY IP 250 OP 250 PS 637: Performed by: PHYSICIAN ASSISTANT

## 2025-05-13 PROCEDURE — 36415 COLL VENOUS BLD VENIPUNCTURE: CPT

## 2025-05-13 PROCEDURE — 250N000013 HC RX MED GY IP 250 OP 250 PS 637

## 2025-05-13 PROCEDURE — 99232 SBSQ HOSP IP/OBS MODERATE 35: CPT | Mod: GC | Performed by: INTERNAL MEDICINE

## 2025-05-13 PROCEDURE — 258N000003 HC RX IP 258 OP 636: Performed by: PHYSICIAN ASSISTANT

## 2025-05-13 PROCEDURE — 84132 ASSAY OF SERUM POTASSIUM: CPT | Performed by: INTERNAL MEDICINE

## 2025-05-13 PROCEDURE — 82247 BILIRUBIN TOTAL: CPT

## 2025-05-13 PROCEDURE — 250N000012 HC RX MED GY IP 250 OP 636 PS 637: Performed by: PHYSICIAN ASSISTANT

## 2025-05-13 PROCEDURE — 250N000012 HC RX MED GY IP 250 OP 636 PS 637

## 2025-05-13 PROCEDURE — 250N000013 HC RX MED GY IP 250 OP 250 PS 637: Performed by: INTERNAL MEDICINE

## 2025-05-13 PROCEDURE — 99233 SBSQ HOSP IP/OBS HIGH 50: CPT | Mod: FS | Performed by: STUDENT IN AN ORGANIZED HEALTH CARE EDUCATION/TRAINING PROGRAM

## 2025-05-13 PROCEDURE — 87207 SMEAR SPECIAL STAIN: CPT | Performed by: INTERNAL MEDICINE

## 2025-05-13 PROCEDURE — 85041 AUTOMATED RBC COUNT: CPT

## 2025-05-13 PROCEDURE — 99207 PR APP CREDIT; MD BILLING SHARED VISIT: CPT

## 2025-05-13 PROCEDURE — 36415 COLL VENOUS BLD VENIPUNCTURE: CPT | Performed by: INTERNAL MEDICINE

## 2025-05-13 PROCEDURE — 87177 OVA AND PARASITES SMEARS: CPT | Performed by: INTERNAL MEDICINE

## 2025-05-13 RX ORDER — POTASSIUM CHLORIDE 750 MG/1
20 TABLET, EXTENDED RELEASE ORAL ONCE
Status: COMPLETED | OUTPATIENT
Start: 2025-05-13 | End: 2025-05-13

## 2025-05-13 RX ORDER — BISACODYL 5 MG
5 TABLET, DELAYED RELEASE (ENTERIC COATED) ORAL ONCE
Status: COMPLETED | OUTPATIENT
Start: 2025-05-13 | End: 2025-05-13

## 2025-05-13 RX ORDER — POTASSIUM CHLORIDE 750 MG/1
40 TABLET, EXTENDED RELEASE ORAL ONCE
Status: COMPLETED | OUTPATIENT
Start: 2025-05-13 | End: 2025-05-13

## 2025-05-13 RX ADMIN — MYCOPHENOLATE MOFETIL 750 MG: 250 CAPSULE ORAL at 07:34

## 2025-05-13 RX ADMIN — FERROUS SULFATE TAB 325 MG (65 MG ELEMENTAL FE) 325 MG: 325 (65 FE) TAB at 07:35

## 2025-05-13 RX ADMIN — BISACODYL 5 MG: 5 TABLET, COATED ORAL at 17:19

## 2025-05-13 RX ADMIN — TAMSULOSIN HYDROCHLORIDE 0.4 MG: 0.4 CAPSULE ORAL at 07:35

## 2025-05-13 RX ADMIN — MYCOPHENOLATE MOFETIL 750 MG: 250 CAPSULE ORAL at 17:20

## 2025-05-13 RX ADMIN — FERROUS SULFATE TAB 325 MG (65 MG ELEMENTAL FE) 325 MG: 325 (65 FE) TAB at 17:19

## 2025-05-13 RX ADMIN — SIROLIMUS 3 MG: 2 TABLET ORAL at 07:52

## 2025-05-13 RX ADMIN — ACETAMINOPHEN 325 MG: 325 TABLET ORAL at 20:13

## 2025-05-13 RX ADMIN — SODIUM CHLORIDE, SODIUM LACTATE, POTASSIUM CHLORIDE, AND CALCIUM CHLORIDE: .6; .31; .03; .02 INJECTION, SOLUTION INTRAVENOUS at 07:56

## 2025-05-13 RX ADMIN — CALCIUM CARBONATE 600 MG (1,500 MG)-VITAMIN D3 400 UNIT TABLET 1 TABLET: at 07:35

## 2025-05-13 RX ADMIN — Medication 1 TABLET: at 07:33

## 2025-05-13 RX ADMIN — ACETAMINOPHEN 975 MG: 325 TABLET ORAL at 07:32

## 2025-05-13 RX ADMIN — LATANOPROST 1 DROP: 50 SOLUTION OPHTHALMIC at 20:14

## 2025-05-13 RX ADMIN — POLYETHYLENE GLYCOL 3350, SODIUM SULFATE ANHYDROUS, SODIUM BICARBONATE, SODIUM CHLORIDE, POTASSIUM CHLORIDE 4000 ML: 236; 22.74; 6.74; 5.86; 2.97 POWDER, FOR SOLUTION ORAL at 17:20

## 2025-05-13 RX ADMIN — POTASSIUM CHLORIDE 40 MEQ: 750 TABLET, EXTENDED RELEASE ORAL at 17:19

## 2025-05-13 RX ADMIN — ROSUVASTATIN CALCIUM 10 MG: 10 TABLET, FILM COATED ORAL at 07:35

## 2025-05-13 RX ADMIN — CALCIUM CARBONATE 600 MG (1,500 MG)-VITAMIN D3 400 UNIT TABLET 1 TABLET: at 17:19

## 2025-05-13 RX ADMIN — ASPIRIN 81 MG CHEWABLE TABLET 81 MG: 81 TABLET CHEWABLE at 07:34

## 2025-05-13 RX ADMIN — SODIUM CHLORIDE, SODIUM LACTATE, POTASSIUM CHLORIDE, AND CALCIUM CHLORIDE: .6; .31; .03; .02 INJECTION, SOLUTION INTRAVENOUS at 20:12

## 2025-05-13 RX ADMIN — POTASSIUM CHLORIDE 20 MEQ: 750 TABLET, EXTENDED RELEASE ORAL at 23:41

## 2025-05-13 RX ADMIN — MAGNESIUM 64 MG (MAGNESIUM CHLORIDE) TABLET,DELAYED RELEASE 535 MG: at 07:34

## 2025-05-13 ASSESSMENT — ACTIVITIES OF DAILY LIVING (ADL)
ADLS_ACUITY_SCORE: 40

## 2025-05-13 NOTE — PLAN OF CARE
Goal Outcome Evaluation:      Plan of Care Reviewed With: patient    Overall Patient Progress: improvingOverall Patient Progress: improving    Outcome Evaluation: imodium held. Golytly first bottle completed. VSS. NPO    BP 93/50 (BP Location: Left arm)   Pulse 95   Temp 97.4  F (36.3  C) (Oral)   Resp 18   SpO2 98%     Shift: [id number]  Isolation Status: NA  VS: VSS on RA, afebrile  Neuro: Aox4  Behaviors: cooperative   BG: AC/HS on SS  Labs: RN managed K. Replaced yesterday   Respiratory: RA  Cardiac: WDL  Pain/Nausea: denies   PRN: none  Diet: NPO for procedure   IV Access: L PIV  Infusion(s): LR @ 100 ml/hr  Lines/Drains: none  GI/: voiding. 2-3 Loose BMs  Skin: WDL  Mobility: Indep  Events/Education: Colonoscopy/Upper endoscopy  5/14  Plan: cont w/POC

## 2025-05-13 NOTE — PLAN OF CARE
Goal Outcome Evaluation: [id number] - Lucian has been afebrile w stable VS this shift on room air. Denies pain and nausea. LR infusing @ 100crr/hr. Tolerating a clear liquid diet without issue. Voiding without saving, placed hat to try and keep better track of urine. Pt was having multiple loose stools d/t Golytely, next dose due at 1600. Stool sample sent to lab per orders. Pt care orders placed for prep regimen. Plan for colonoscopy tomorrow to rule out MMF toxicity. Pt showered and encouraged OOB activity when possible. BGs 129 & 136. Will continue to monitor and notify team w concerns.

## 2025-05-13 NOTE — PROGRESS NOTES
Cardiology Progress Note    Date of Service: 05/13/2025  Patient: Lucian Oliveira  MRN: [id number]  Admission Date: 5/9/2025  Hospital Day: 4    Cardiology Problem List:  Ischemic Cardiomyopathy s/p OHT 7/19/2020  primary graft dysfunction  with recovered graft function  history of HIT status post Plex  Diabetes Mellitus, Type II  hypertension  hyperlipidemia  retinal detachment status post vitrectomy in December 2020  Pulmonary HTN, WHO class    Diarrhea, acute  DAYA  Hypokalemia  Hyponatremia    Assessment:   Lucian Oliveira is a 69 year old male with a past medical history significant for CAD s/p CABG in 2008, ICM and s/p orthotopic heart transplant in 7/2020, HTN, HLD, mild pulmonary HTN, CHANTEL, HIT s/p PLEX, CKD, Type II DM, vitreous hemorrhage, Norovirus and EAEC (2024 type 2 diabetes, and CKD stage 3, admitted 5/9/25 p/w diarrhea x 8 days with intermittent vomiting, mild abdominal pain and poor po intake, found to have DAYA, hypokalemia, hyponatremia and mild leukocytosis, with CT abdomen/pelvis showing mild enterocolitis. Cardiology heart failure team consulted for management of immunosuppression.     Patient reports he has had watery diarrhea for 8 days, 7-8 episodes per day without bleeding. Mild leukocytosis. Enteric pathogen panel negative and c-diff negative. CT A/P showing normal caliber small bowel, duodenal and jejunal diverticula w/o inflammation, cholelithiasis and fluid in colon possibly representing enterocolitis. Infectious work-up negative to date with CMV and EBV negative. GI consulted for colonoscopy with biopsy to determine if MMF toxicity cause of diarrhea. Colonoscopy planned for tomorrow. Will consider adjustments to immunosuppression based on biopsy results.    Serostatus:   Viral serostatus: CMV D+/R+, EBV D+/R+, HSV 1+/2-, VZV +  Other serostatus: Toxoplasma D+/R-, on bactrim  Blood type: O positive     Immunosuppression:  -Calcineurin Inhibitor: Sirolimus 3 mg po  daily, goal level 6-8  -Cell cycle Inhibitor: Mycophenolate 750 mg BID (PTA dose 1500 mg BID)  - No rejection history     Infection Prophylaxis:  Fungal Prophylaxis: none  Bacterial prophylaxis: none  PJP prophylaxis: tmp/smx  IgG:10/12/23 668  Qtc: 442 ms on 5/9/25     Recommendations:   Recommend colonoscopy with biopsy while admitted to evaluate for MMF toxicity, planned for tomorrow per GI  Continue decreased mycophenolate dose of 750 mg BID  Continue Sirolimus 3 mg po daily  Transplant infectious disease following for cause of diarrhea, appreciate recs  CMV, EBV negative  We will continue to follow.  Rest of care per primary team.     Patient was seen and plan of care was discussed with attending physician Dr. Santos. Final recommendations pending Attending Attestation. Please don't hesitate to contact our team with any additional questions or concerns.     Mariano Patterson MD  Internal Medicine Resident, PGY-1    Subjective    Interval History:  No acute events overnight. This morning, patient states he feels better. Patient reports diarrhea improved yesterday but is now taking colonoscopy prep. Denies SOB, dizziness, CP, abdominal pain or cramping.    Review of Systems:  A comprehensive ROS was performed and found to be negative or non-contributory with the exception of that noted in the HPI above.    Current Facility-Administered Medications   Medication Dose Route Frequency Provider Last Rate Last Admin    acetaminophen (TYLENOL) tablet 975 mg  975 mg Oral TID Olga Denney PA   975 mg at 05/13/25 0732    aspirin (ASA) chewable tablet 81 mg  81 mg Oral Daily Olga Denney PA   81 mg at 05/13/25 0734    benzocaine-menthol (CHLORASEPTIC MAX) 15-10 MG lozenge 1 lozenge  1 lozenge Buccal Q1H PRN Olga Denney PA        bisacodyl (DULCOLAX) EC tablet 5 mg  5 mg Oral Once Sang Waldron PA-C        calcium carbonate (TUMS) chewable tablet 1,000 mg  1,000 mg Oral 4x Daily PRN Olga Denney PA         calcium carbonate-vitamin D (CALTRATE) 600-10 MG-MCG per tablet 1 tablet  1 tablet Oral BID w/meals Olga Denney PA   1 tablet at 05/13/25 0735    glucose gel 15-30 g  15-30 g Oral Q15 Min PRN Olga Denney PA        Or    dextrose 50 % injection 25-50 mL  25-50 mL Intravenous Q15 Min PRN Olga Denney PA        Or    glucagon injection 1 mg  1 mg Subcutaneous Q15 Min PRN Olga Denney PA        ferrous sulfate (FEROSUL) tablet 325 mg  325 mg Oral BID w/meals Olga Denney PA   325 mg at 05/13/25 0735    insulin aspart (NovoLOG) injection (RAPID ACTING)  1-7 Units Subcutaneous TID AC Olga Denney PA   2 Units at 05/12/25 1810    insulin aspart (NovoLOG) injection (RAPID ACTING)  1-5 Units Subcutaneous At Bedtime Olga Denney PA   1 Units at 05/12/25 2217    lactated ringers infusion   Intravenous Continuous Cayetano Corona PA-C 100 mL/hr at 05/13/25 0756 New Bag at 05/13/25 0756    latanoprost (XALATAN) 0.005 % ophthalmic solution 1 drop  1 drop Both Eyes QPM Olga Denney PA   1 drop at 05/12/25 2217    lidocaine (LMX4) cream   Topical Q1H PRN Olga Denney PA        lidocaine 1 % 0.1-1 mL  0.1-1 mL Other Q1H PRN Olga Denney PA        [Held by provider] lisinopril (ZESTRIL) tablet 5 mg  5 mg Oral Daily Olag Denney PA   5 mg at 05/10/25 0944    [Held by provider] loperamide (IMODIUM) capsule 2 mg  2 mg Oral TID Cayetano Corona PA-C   2 mg at 05/12/25 1423    magnesium chloride CR tablet 535 mg  535 mg Oral Daily Cayetano Corona PA-C   535 mg at 05/13/25 0734    multivitamin w/minerals (THERA-VIT-M) tablet 1 tablet  1 tablet Oral Daily Linda Martinez PA-C   1 tablet at 05/13/25 0733    mycophenolate (GENERIC EQUIVALENT) capsule 750 mg  750 mg Oral BID IS Castro Roberts MD   750 mg at 05/13/25 0734    ondansetron (ZOFRAN ODT) ODT tab 4 mg  4 mg Oral Q6H PRN Chucho Coreas PA-C        ondansetron (ZOFRAN) injection 4 mg   4 mg Intravenous Q6H PRN Chucho Coreas PA-C   4 mg at 05/11/25 1843    [START ON 5/14/2025] polyethylene glycol (GoLYTELY) suspension 2,000 mL  2,000 mL Oral Once Linda Martinez PA-C        polyethylene glycol (GoLYTELY) suspension 4,000 mL  4,000 mL Oral Once Linda Martinez PA-C        prochlorperazine (COMPAZINE) injection 5 mg  5 mg Intravenous Q6H PRN Chucho Coreas PA-C        Or    prochlorperazine (COMPAZINE) tablet 5 mg  5 mg Oral Q6H PRN Chucho Coreas PA-C        Or    prochlorperazine (COMPAZINE) suppository 12.5 mg  12.5 mg Rectal Q12H PRN Chucho Coreas PA-C        rosuvastatin (CRESTOR) tablet 10 mg  10 mg Oral Daily Olga Denney PA   10 mg at 05/13/25 0735    sirolimus (GENERIC EQUIVALENT) tablet 3 mg  3 mg Oral Daily Olga Denney PA   3 mg at 05/13/25 0752    sodium chloride (PF) 0.9% PF flush 3 mL  3 mL Intracatheter Q8H MUSA Olga Denney PA   3 mL at 05/12/25 2126    sodium chloride (PF) 0.9% PF flush 3 mL  3 mL Intracatheter q1 min prn Olga Denney PA   3 mL at 05/11/25 1557    [Held by provider] sulfamethoxazole-trimethoprim (BACTRIM) 400-80 MG per tablet 1 tablet  1 tablet Oral Once per day on Monday Wednesday Friday Olga Denney PA        tamsulosin (FLOMAX) capsule 0.4 mg  0.4 mg Oral QAM Olga Denney PA   0.4 mg at 05/13/25 0735    traMADol (ULTRAM) half-tab 25 mg  25 mg Oral Q6H PRN Olga Denney PA   25 mg at 05/11/25 2029     Objective   Physical Exam:    Blood pressure 120/64, pulse 88, temperature 98.4  F (36.9  C), temperature source Oral, resp. rate 18, SpO2 100%.    Exam:  General: NAD, resting comfortably in bed  HEENT: no scleral icterus or injection  CARDIAC: RRR, no murmurs. No JVD  RESP:  CTAB, no rales, wheezes or rhonchi or wheezes  GI: soft, nondistended, nontender, bowel sounds hypoactive,   : No villalobos  EXTREMITIES: no LE edema  SKIN: No acute lesions appreciated  NEURO: No focal deficits    Labs & Studies: I personally reviewed  the following studies:  ROUTINE LABS (Last four results):  CMP  Recent Labs   Lab 05/13/25  0755 05/13/25  0559 05/13/25  0151 05/12/25 2059 05/12/25  0754 05/12/25  0335 05/11/25 2109 05/11/25 2022 05/11/25  1819 05/11/25  1323 05/11/25  0825 05/11/25  0635 05/10/25  1202 05/10/25  0931 05/09/25  2147 05/09/25  1625   NA  --  141  --   --   --  135  --   --   --   --   --  131*  --  129*  --  128*   POTASSIUM  --  3.0*  --   --   --  3.5  3.5  --  3.2*  --  3.4  --  3.0*   < > 2.8*   < > 2.6*   CHLORIDE  --  99  --   --   --  96*  --   --   --   --   --  98  --  99  --  92*   CO2  --  29  --   --   --  23  --   --   --   --   --  16*  --  11*  --  13*   ANIONGAP  --  13  --   --   --  16*  --   --   --   --   --  17*  --  19*  --  23*   * 156* 271* 221*   < > 113*   < >  --    < >  --    < > 144*   < > 203*   < > 194*   BUN  --  85.3*  --   --   --  104.0*  --   --   --   --   --  105.0*  --  98.6*  --  93.8*   CR  --  2.45*  --   --   --  3.27*  --   --   --   --   --  4.00*  --  4.50*  --  4.88*   GFRESTIMATED  --  27*  --   --   --  19*  --   --   --   --   --  15*  --  13*  --  12*   BRYANNA  --  8.7*  --   --   --  8.3*  --   --   --   --   --  8.4*  --  8.5*  --  8.6*   MAG  --   --   --   --   --   --   --   --   --   --   --   --   --   --   --  1.7   PROTTOTAL  --  6.1*  --   --   --   --   --   --   --   --   --   --   --   --   --  7.7   ALBUMIN  --  3.8  --   --   --   --   --   --   --   --   --   --   --   --   --  4.6   BILITOTAL  --  0.2  --   --   --   --   --   --   --   --   --   --   --   --   --  0.3   ALKPHOS  --  114  --   --   --   --   --   --   --   --   --   --   --   --   --  174*   AST  --  17  --   --   --   --   --   --   --   --   --   --   --   --   --  13   ALT  --  9  --   --   --   --   --   --   --   --   --   --   --   --   --  9    < > = values in this interval not displayed.     CBC  Recent Labs   Lab 05/13/25  0601 05/12/25  0335 05/11/25  0635 05/10/25  0931   WBC  6.4 7.8 8.0 9.2   RBC 3.30* 3.33* 3.65* 3.73*   HGB 8.9* 8.9* 10.0* 10.1*   HCT 26.1* 26.2* 28.8* 29.5*   MCV 79 79 79 79   MCH 27.0 26.7 27.4 27.1   MCHC 34.1 34.0 34.7 34.2   RDW 15.7* 15.8* 15.8* 15.9*    213 249 272     INRNo lab results found in last 7 days.    Imaging:  CT Abdomen Pelvis w/o Contrast   Final Result   IMPRESSION:    1. Fluid-filled colon which is nonspecific but can be seen with   enterocolitis. No additional acute findings in the abdomen or pelvis.   Normal appendix.   2. Cholelithiasis without evidence of cholecystitis.   3. Duodenal and jejunal diverticula without evidence of   diverticulitis.      I have personally reviewed the examination and initial interpretation   and I agree with the findings.      NIKOLE DAVILA MD            SYSTEM ID:  [id number]

## 2025-05-13 NOTE — PROGRESS NOTES
Brief nephrology progress note:    Excellent improvement in Cr, very close to baseline. Bicarbonate WNL. K repletion per primary. Nephrology will sign off at this time. Please do not hesitate to reach out with any future questions or concerns.

## 2025-05-14 LAB
ALBUMIN SERPL BCG-MCNC: 3.9 G/DL (ref 3.5–5.2)
ALP SERPL-CCNC: 116 U/L (ref 40–150)
ALT SERPL W P-5'-P-CCNC: 10 U/L (ref 0–70)
ANION GAP SERPL CALCULATED.3IONS-SCNC: 11 MMOL/L (ref 7–15)
AST SERPL W P-5'-P-CCNC: 22 U/L (ref 0–45)
BASOPHILS # BLD AUTO: 0 10E3/UL (ref 0–0.2)
BASOPHILS NFR BLD AUTO: 0 %
BILIRUB SERPL-MCNC: 0.3 MG/DL
BUN SERPL-MCNC: 63 MG/DL (ref 8–23)
CALCIUM SERPL-MCNC: 9 MG/DL (ref 8.8–10.4)
CALPROTECTIN STL-MCNT: 158 MG/KG (ref 0–49.9)
CHLORIDE SERPL-SCNC: 97 MMOL/L (ref 98–107)
CREAT SERPL-MCNC: 1.9 MG/DL (ref 0.67–1.17)
EGFRCR SERPLBLD CKD-EPI 2021: 37 ML/MIN/1.73M2
EOSINOPHIL # BLD AUTO: 0.1 10E3/UL (ref 0–0.7)
EOSINOPHIL NFR BLD AUTO: 1 %
ERYTHROCYTE [DISTWIDTH] IN BLOOD BY AUTOMATED COUNT: 15.6 % (ref 10–15)
GLUCOSE BLDC GLUCOMTR-MCNC: 118 MG/DL (ref 70–99)
GLUCOSE BLDC GLUCOMTR-MCNC: 124 MG/DL (ref 70–99)
GLUCOSE BLDC GLUCOMTR-MCNC: 185 MG/DL (ref 70–99)
GLUCOSE BLDC GLUCOMTR-MCNC: 93 MG/DL (ref 70–99)
GLUCOSE SERPL-MCNC: 121 MG/DL (ref 70–99)
HCO3 SERPL-SCNC: 31 MMOL/L (ref 22–29)
HCT VFR BLD AUTO: 26.3 % (ref 40–53)
HGB BLD-MCNC: 8.6 G/DL (ref 13.3–17.7)
IMM GRANULOCYTES # BLD: 0 10E3/UL
IMM GRANULOCYTES NFR BLD: 0 %
INR PPP: 1.13 (ref 0.85–1.15)
LYMPHOCYTES # BLD AUTO: 1.2 10E3/UL (ref 0.8–5.3)
LYMPHOCYTES NFR BLD AUTO: 16 %
MAGNESIUM SERPL-MCNC: 1.6 MG/DL (ref 1.7–2.3)
MCH RBC QN AUTO: 27.7 PG (ref 26.5–33)
MCHC RBC AUTO-ENTMCNC: 32.7 G/DL (ref 31.5–36.5)
MCV RBC AUTO: 85 FL (ref 78–100)
MONOCYTES # BLD AUTO: 0.7 10E3/UL (ref 0–1.3)
MONOCYTES NFR BLD AUTO: 9 %
NEUTROPHILS # BLD AUTO: 5.4 10E3/UL (ref 1.6–8.3)
NEUTROPHILS NFR BLD AUTO: 73 %
NRBC # BLD AUTO: 0 10E3/UL
NRBC BLD AUTO-RTO: 0 /100
O+P STL MICRO: NEGATIVE
PHOSPHATE SERPL-MCNC: 1.4 MG/DL (ref 2.5–4.5)
PHOSPHATE SERPL-MCNC: 1.7 MG/DL (ref 2.5–4.5)
PLATELET # BLD AUTO: 201 10E3/UL (ref 150–450)
POTASSIUM SERPL-SCNC: 3.3 MMOL/L (ref 3.4–5.3)
POTASSIUM SERPL-SCNC: 3.3 MMOL/L (ref 3.4–5.3)
POTASSIUM SERPL-SCNC: 4.1 MMOL/L (ref 3.4–5.3)
POTASSIUM SERPL-SCNC: 4.2 MMOL/L (ref 3.4–5.3)
PROT SERPL-MCNC: 6.3 G/DL (ref 6.4–8.3)
PROTHROMBIN TIME: 14.5 SECONDS (ref 11.8–14.8)
RBC # BLD AUTO: 3.1 10E6/UL (ref 4.4–5.9)
SODIUM SERPL-SCNC: 139 MMOL/L (ref 135–145)
SPECIMEN TYPE: NORMAL
TRI STN SPEC: NORMAL
WBC # BLD AUTO: 7.4 10E3/UL (ref 4–11)

## 2025-05-14 PROCEDURE — 36415 COLL VENOUS BLD VENIPUNCTURE: CPT | Performed by: TRANSPLANT SURGERY

## 2025-05-14 PROCEDURE — 85610 PROTHROMBIN TIME: CPT

## 2025-05-14 PROCEDURE — 250N000013 HC RX MED GY IP 250 OP 250 PS 637: Performed by: PHYSICIAN ASSISTANT

## 2025-05-14 PROCEDURE — 43239 EGD BIOPSY SINGLE/MULTIPLE: CPT | Performed by: INTERNAL MEDICINE

## 2025-05-14 PROCEDURE — 88305 TISSUE EXAM BY PATHOLOGIST: CPT | Mod: TC | Performed by: INTERNAL MEDICINE

## 2025-05-14 PROCEDURE — 36415 COLL VENOUS BLD VENIPUNCTURE: CPT

## 2025-05-14 PROCEDURE — 0DBE8ZX EXCISION OF LARGE INTESTINE, VIA NATURAL OR ARTIFICIAL OPENING ENDOSCOPIC, DIAGNOSTIC: ICD-10-PCS | Performed by: INTERNAL MEDICINE

## 2025-05-14 PROCEDURE — 250N000012 HC RX MED GY IP 250 OP 636 PS 637

## 2025-05-14 PROCEDURE — 99207 PR APP CREDIT; MD BILLING SHARED VISIT: CPT

## 2025-05-14 PROCEDURE — 84100 ASSAY OF PHOSPHORUS: CPT

## 2025-05-14 PROCEDURE — 250N000013 HC RX MED GY IP 250 OP 250 PS 637: Performed by: TRANSPLANT SURGERY

## 2025-05-14 PROCEDURE — 99232 SBSQ HOSP IP/OBS MODERATE 35: CPT | Mod: FS | Performed by: STUDENT IN AN ORGANIZED HEALTH CARE EDUCATION/TRAINING PROGRAM

## 2025-05-14 PROCEDURE — 999N000248 HC STATISTIC IV INSERT WITH US BY RN

## 2025-05-14 PROCEDURE — 250N000011 HC RX IP 250 OP 636: Performed by: INTERNAL MEDICINE

## 2025-05-14 PROCEDURE — 120N000011 HC R&B TRANSPLANT UMMC

## 2025-05-14 PROCEDURE — 45380 COLONOSCOPY AND BIOPSY: CPT | Performed by: INTERNAL MEDICINE

## 2025-05-14 PROCEDURE — 99232 SBSQ HOSP IP/OBS MODERATE 35: CPT | Mod: GC | Performed by: INTERNAL MEDICINE

## 2025-05-14 PROCEDURE — 250N000011 HC RX IP 250 OP 636

## 2025-05-14 PROCEDURE — 250N000013 HC RX MED GY IP 250 OP 250 PS 637

## 2025-05-14 PROCEDURE — 0DB98ZX EXCISION OF DUODENUM, VIA NATURAL OR ARTIFICIAL OPENING ENDOSCOPIC, DIAGNOSTIC: ICD-10-PCS | Performed by: INTERNAL MEDICINE

## 2025-05-14 PROCEDURE — 80048 BASIC METABOLIC PNL TOTAL CA: CPT

## 2025-05-14 PROCEDURE — 99153 MOD SED SAME PHYS/QHP EA: CPT | Performed by: INTERNAL MEDICINE

## 2025-05-14 PROCEDURE — 258N000003 HC RX IP 258 OP 636: Performed by: PHYSICIAN ASSISTANT

## 2025-05-14 PROCEDURE — 88305 TISSUE EXAM BY PATHOLOGIST: CPT | Mod: 26 | Performed by: PATHOLOGY

## 2025-05-14 PROCEDURE — 84132 ASSAY OF SERUM POTASSIUM: CPT

## 2025-05-14 PROCEDURE — 250N000012 HC RX MED GY IP 250 OP 636 PS 637: Performed by: PHYSICIAN ASSISTANT

## 2025-05-14 PROCEDURE — 85048 AUTOMATED LEUKOCYTE COUNT: CPT

## 2025-05-14 PROCEDURE — 83735 ASSAY OF MAGNESIUM: CPT

## 2025-05-14 PROCEDURE — G0500 MOD SEDAT ENDO SERVICE >5YRS: HCPCS | Performed by: INTERNAL MEDICINE

## 2025-05-14 PROCEDURE — 88342 IMHCHEM/IMCYTCHM 1ST ANTB: CPT | Mod: 26 | Performed by: PATHOLOGY

## 2025-05-14 PROCEDURE — 0DBB8ZX EXCISION OF ILEUM, VIA NATURAL OR ARTIFICIAL OPENING ENDOSCOPIC, DIAGNOSTIC: ICD-10-PCS | Performed by: INTERNAL MEDICINE

## 2025-05-14 PROCEDURE — 84132 ASSAY OF SERUM POTASSIUM: CPT | Performed by: TRANSPLANT SURGERY

## 2025-05-14 PROCEDURE — 0DB68ZX EXCISION OF STOMACH, VIA NATURAL OR ARTIFICIAL OPENING ENDOSCOPIC, DIAGNOSTIC: ICD-10-PCS | Performed by: INTERNAL MEDICINE

## 2025-05-14 RX ORDER — FENTANYL CITRATE 50 UG/ML
INJECTION, SOLUTION INTRAMUSCULAR; INTRAVENOUS PRN
Status: DISCONTINUED | OUTPATIENT
Start: 2025-05-14 | End: 2025-05-17 | Stop reason: HOSPADM

## 2025-05-14 RX ORDER — FUROSEMIDE 20 MG/1
20 TABLET ORAL DAILY
Status: DISCONTINUED | OUTPATIENT
Start: 2025-05-14 | End: 2025-05-17 | Stop reason: HOSPADM

## 2025-05-14 RX ORDER — PANTOPRAZOLE SODIUM 40 MG/1
40 TABLET, DELAYED RELEASE ORAL
Status: DISCONTINUED | OUTPATIENT
Start: 2025-05-15 | End: 2025-05-14

## 2025-05-14 RX ORDER — PANTOPRAZOLE SODIUM 40 MG/1
40 TABLET, DELAYED RELEASE ORAL DAILY
Status: DISCONTINUED | OUTPATIENT
Start: 2025-05-15 | End: 2025-05-17 | Stop reason: HOSPADM

## 2025-05-14 RX ORDER — POTASSIUM CHLORIDE 750 MG/1
40 TABLET, EXTENDED RELEASE ORAL ONCE
Status: COMPLETED | OUTPATIENT
Start: 2025-05-14 | End: 2025-05-14

## 2025-05-14 RX ORDER — MAGNESIUM SULFATE HEPTAHYDRATE 40 MG/ML
2 INJECTION, SOLUTION INTRAVENOUS ONCE
Status: COMPLETED | OUTPATIENT
Start: 2025-05-14 | End: 2025-05-14

## 2025-05-14 RX ADMIN — MAGNESIUM SULFATE HEPTAHYDRATE 2 G: 2 INJECTION, SOLUTION INTRAVENOUS at 08:28

## 2025-05-14 RX ADMIN — POTASSIUM & SODIUM PHOSPHATES POWDER PACK 280-160-250 MG 2 PACKET: 280-160-250 PACK at 16:21

## 2025-05-14 RX ADMIN — MAGNESIUM 64 MG (MAGNESIUM CHLORIDE) TABLET,DELAYED RELEASE 535 MG: at 08:22

## 2025-05-14 RX ADMIN — ACETAMINOPHEN 975 MG: 325 TABLET ORAL at 08:25

## 2025-05-14 RX ADMIN — ASPIRIN 81 MG CHEWABLE TABLET 81 MG: 81 TABLET CHEWABLE at 08:26

## 2025-05-14 RX ADMIN — TAMSULOSIN HYDROCHLORIDE 0.4 MG: 0.4 CAPSULE ORAL at 08:24

## 2025-05-14 RX ADMIN — FERROUS SULFATE TAB 325 MG (65 MG ELEMENTAL FE) 325 MG: 325 (65 FE) TAB at 18:27

## 2025-05-14 RX ADMIN — CALCIUM CARBONATE 600 MG (1,500 MG)-VITAMIN D3 400 UNIT TABLET 1 TABLET: at 08:25

## 2025-05-14 RX ADMIN — SIROLIMUS 3 MG: 2 TABLET ORAL at 08:22

## 2025-05-14 RX ADMIN — Medication 1 TABLET: at 08:25

## 2025-05-14 RX ADMIN — POTASSIUM CHLORIDE 40 MEQ: 750 TABLET, EXTENDED RELEASE ORAL at 08:24

## 2025-05-14 RX ADMIN — POLYETHYLENE GLYCOL 3350, SODIUM SULFATE ANHYDROUS, SODIUM BICARBONATE, SODIUM CHLORIDE, POTASSIUM CHLORIDE 2000 ML: 236; 22.74; 6.74; 5.86; 2.97 POWDER, FOR SOLUTION ORAL at 04:39

## 2025-05-14 RX ADMIN — CALCIUM CARBONATE 600 MG (1,500 MG)-VITAMIN D3 400 UNIT TABLET 1 TABLET: at 18:27

## 2025-05-14 RX ADMIN — ROSUVASTATIN CALCIUM 10 MG: 10 TABLET, FILM COATED ORAL at 08:24

## 2025-05-14 RX ADMIN — FERROUS SULFATE TAB 325 MG (65 MG ELEMENTAL FE) 325 MG: 325 (65 FE) TAB at 08:24

## 2025-05-14 RX ADMIN — LATANOPROST 1 DROP: 50 SOLUTION OPHTHALMIC at 20:00

## 2025-05-14 RX ADMIN — ACETAMINOPHEN 975 MG: 325 TABLET ORAL at 20:00

## 2025-05-14 RX ADMIN — POTASSIUM & SODIUM PHOSPHATES POWDER PACK 280-160-250 MG 2 PACKET: 280-160-250 PACK at 08:20

## 2025-05-14 RX ADMIN — MYCOPHENOLATE MOFETIL 750 MG: 250 CAPSULE ORAL at 08:21

## 2025-05-14 RX ADMIN — MYCOPHENOLATE MOFETIL 750 MG: 250 CAPSULE ORAL at 18:27

## 2025-05-14 RX ADMIN — POTASSIUM CHLORIDE 40 MEQ: 750 TABLET, EXTENDED RELEASE ORAL at 06:22

## 2025-05-14 RX ADMIN — SODIUM CHLORIDE, SODIUM LACTATE, POTASSIUM CHLORIDE, AND CALCIUM CHLORIDE: .6; .31; .03; .02 INJECTION, SOLUTION INTRAVENOUS at 18:05

## 2025-05-14 RX ADMIN — SODIUM CHLORIDE, SODIUM LACTATE, POTASSIUM CHLORIDE, AND CALCIUM CHLORIDE: .6; .31; .03; .02 INJECTION, SOLUTION INTRAVENOUS at 06:10

## 2025-05-14 ASSESSMENT — ACTIVITIES OF DAILY LIVING (ADL)
ADLS_ACUITY_SCORE: 44
ADLS_ACUITY_SCORE: 40
ADLS_ACUITY_SCORE: 43
ADLS_ACUITY_SCORE: 40
ADLS_ACUITY_SCORE: 44
ADLS_ACUITY_SCORE: 43
ADLS_ACUITY_SCORE: 44
ADLS_ACUITY_SCORE: 44
ADLS_ACUITY_SCORE: 40
ADLS_ACUITY_SCORE: 40
ADLS_ACUITY_SCORE: 44
ADLS_ACUITY_SCORE: 40
ADLS_ACUITY_SCORE: 44
ADLS_ACUITY_SCORE: 43
ADLS_ACUITY_SCORE: 40
ADLS_ACUITY_SCORE: 43
ADLS_ACUITY_SCORE: 40
ADLS_ACUITY_SCORE: 44
ADLS_ACUITY_SCORE: 40
ADLS_ACUITY_SCORE: 40

## 2025-05-14 NOTE — CARE PLAN
Pt in NAD, A&OX4, Gait steady, VSS, resp even & unlabored on RA, afebrile, sammie bowel prep well, stools liquid, blackish/green; began new 2L jug @0400am. In good spirits, unable to sleep d/t drinking bowel prep all night. Appears very tired.

## 2025-05-14 NOTE — PLAN OF CARE
Goal Outcome Evaluation:      Plan of Care Reviewed With: patient, spouse    Overall Patient Progress: improvingOverall Patient Progress: improving    Outcome Evaluation: 1500 - 1900: Pt A/Ox4, VSS on RA. Czech speaking, can make basic needs known in English. Pt had EGD and colonoscopy done today to investigate N/V and diarrhea of unknown cause. Biopsies taken from both. Pt still endorsening some diarrhea but it is improving. New L PIV in use for LR infusion at 100mL/hr. Advanced from CLD to FLD on shift and tolerating food w/o nausea. Pt did have manual Mag replacement today, and then RN driven phos replacement completed. Next Phos recheck at 20:02. Pt hopeful to discharge to home tomorrow

## 2025-05-14 NOTE — OR NURSING
Patient underwent EGD with biopsies + colonoscopy with biopsies under conscious sedation. Tolerated procedure. Specimens sent to lab. Report given to AURORA Blankenship. Transported back to PCU on RA.

## 2025-05-14 NOTE — PLAN OF CARE
Goal Outcome Evaluation:      Plan of Care Reviewed With: patient, spouse    Overall Patient Progress: improvingOverall Patient Progress: improving    Outcome Evaluation: EGD and Colonoscopy completed    /51   Pulse 85   Temp 98.7  F (37.1  C) (Oral)   Resp 13   SpO2 99%    Neuro: A/Ox3  VS: VSS on RA  Pain: Denies  GI: NPO most of the shift, pt completed Golytely and had Colonoscopy and EGD completed and pt is now clear liquid diet. Denies nausea. Multiple stools this AM  Electrolytes: K 3.3 replaced with 40mEq and recheck is pending (scheduled 1300) Mg 1.6 replaced with 2gr Mg IV. Phos 1.4 and replaced with PO recheck scheduled 8pm  : Voiding without difficulty  PIV: infusing LR 100ml/h  Skin: intact  Activity - Independent in room

## 2025-05-14 NOTE — PLAN OF CARE
D/I/A/: Pt admitted with diarrhea.  PMH heart transplant.  K 3.0 replaced per protocol.  Denies pain, VSS.    P:  Pt NPO after MN for colonoscopy/bx.  Recheck K this evening.  Update team with changes/concerns.

## 2025-05-14 NOTE — BRIEF OP NOTE
[facility] Brief Operative Note    Pre-operative diagnosis: Diarrhea [R19.7]   Post-operative diagnosis * No post-op diagnosis entered *     Procedure: Procedure(s):  ESOPHAGOGASTRODUODENOSCOPY, WITH BIOPSY  COLONOSCOPY, WITH BIOPSY   Surgeon(s): Surgeons and Role:     * Brendan Aldrich MD - Primary   Estimated blood loss: * No values recorded between 5/14/2025 12:48 PM and 5/14/2025  1:49 PM *    Specimens: ID Type Source Tests Collected by Time Destination   1 : 2nd portion Biopsy Small Intestine, Duodenum SURGICAL PATHOLOGY EXAM Brendan Aldrich MD 5/14/2025 12:59 PM    2 : Gastric biopsy Biopsy Stomach SURGICAL PATHOLOGY EXAM Brendan Aldrich MD 5/14/2025  1:05 PM    3 : Terminal ileum biopsy Biopsy Small Intestine, Terminal Ileum SURGICAL PATHOLOGY EXAM Brendan Aldrich MD 5/14/2025  1:30 PM    4 : Random Colon Biopsy Large Intestine, Colon SURGICAL PATHOLOGY EXAM Brendan Aldrich MD 5/14/2025  1:35 PM       Findings:       EGD:   - Gastropathy with mild oozing of blood. No lesion to intervene on   - The EGD was otherwise unremarkable   - Random biopsies were taken from the stomach and duodenum.     Colonoscopy:   - The preparation of the colon was fair   - Mucosa in the colon appeared normal although the prep would have interfered with our visualization. Random biopsies were taken from the colon.   - Small erosion in the terminal ileum. Mucosa was otherwise normal. Random biopsies were taken from the TI       Recommendations:   - Recommend omeprazole 40 mg daily for gastropathy   - Await pathology results .  - GI will sign off.     James Keane MD   GI Fellow

## 2025-05-14 NOTE — PROGRESS NOTES
[facility]    Medicine Progress Note - Hospitalist Service, GOLD TEAM 6    Date of Admission:  5/9/2025    Assessment & Plan   Lucian Oliveira is a 69 year old male with a past medical history of ischemic cardiomyopathy (S/P OHT 2020), HTN, HLD, pHTN, CHANTEL, T2DM, and CKD. He initially presented to the ED for evaluation of diarrhea, and was found to have an DAYA. He was subsequently admitted given diarrhea of unclear etiology c/b DAYA. Remains admitted for further monitoring and management.     Acute Diarrhea, possibly medication-induced  Pt notes onset of diarrhea on 5/2/25 without any associated fevers, abdominal pain, nausea, or vomiting. No known recent sick exposures. Outpatient C diff PCR on 5/9/25 was negative. Presented to the ED where he has since been afebrile, VSS, and not leukocytotic. CT A/P showed fluid-filled colon which is non-specific, but was suggestive of possible enterocolitis. Enteric panel negative on 5/10. CMV, EBV PCRs negative. ID consulted, and no clear infectious source. 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 Cardiology then reduced MMF dose on 5/10, but has not yet had improvement in diarrhea. Has been on Mag Ox since 01/2024, which can contribute to diarrhea, so this was switched to an alternative agent as below.   - Cardiology requests colonoscopy to further evaluate  - GI consult for colonoscopy, which he is undergoing today, procedure pending  - Transplant ID following, no additional recs  - Continue scheduled imodium 2mg TID on hold  - Continue Mag Chloride (switched from Mag Ox)  - Monitor stool output  - Encourage PO hydration  - MIVF per nephrology  - CBC, BMP in AM     DAYA, suspect prerenal, resolved  CKD Stage 3b  Cr 4.88 on arrival (baseline 1.8-2.2). UA negative. Hyaline casts on urine micro and urine Na < 20 suggests hypovolemia. No evidence of hydro on recent CT. Renal  function improved w IVF, however diarrhea persists. Repeat Cr back to baseline today; 1.90 today. BUN 63.0. No uremic symptoms. See below re: AGMA.   - Nephrology signed off   - Discontinued bicarb drip yesterday  - Fluids per nephrology  - Avoid nephrotoxins  - Will resume Jardiance when taking better PO closer to discharge  - Holding PTA lasix, jardiance, lisinopril and bactrim  - Repeat BMP in AM     Hyponatremia, resolved  Na 128 on arrival. Urine Na < 20. Likely volume depletion in setting of diarrhea. Na improved to 135 with IV fluids and stable this AM.  - BMP in AM     Hypokalemia   K 2.6 on arrival. Likely secondary to diarrhea, now complicated by treatment of metabolic acidosis causing intracellular shift. Repeat K 3.3 today.  - Continue RN replacement protocol  - BMP in AM    Hypomagnesemia   Mag 1.6 today, in the setting of bowel prep as above for colonoscopy.    - Repleted with 2g IV Mag Sulfate  - Will recheck Mag in AM     Anion Gap Metabolic Acidosis, improving  Possibly multifactorial with GI bicarb loss, uremia, and DAYA/CKD. Resolved with bicarb drip. CO2 31 on BMP today.  - Nephrology consulted, but now signed off  - Sodium bicarb drip stopped 5/13, and bicarb stable  - Trend BMP in AM     Hx ICM s/p OHT (2020)  No acute concerns. 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   - Continue reduced of MMF 750mg BID (50% home dose)  - Sirolimus 3mg daily  - Will assess renal function in AM following colonoscopy today, and if stable will resume PTA Bactrim, Lasix, and Lisinopril  - Resume PTA Jardiancewhen taking better PO closer to discharge   - Per OP notes, was supposed to increase from 10mg daily-->25mg daily but never did. Thus, will start 25mg in AM  - Transplant Cardiology following, appreciate recommendations  - ? Transition to Myfortic if not improving     Type II NSTEMI, demand ischemia  Trop mildly elevated but down-trending. Suspect demand ischemia in setting of severe dehydration and DAYA.  "No chest pain. EKG negative.  - Monitor clinically  - No further trending of Trop necessary at this time unless clinically indicated     Chronic Normocytic Anemia, stable  Hgb at baseline on admission. No evidence of bleeding. Repeat Hgb 8.9 today. Suspect dilutional effect from IVF.  - Monitor clinically  - CBC in AM     Type 2 Diabetes Mellitus  PTA tresiba and jardiance at home.  - Holding PTA Jardiance d/t DAYA (will consider resuming in the coming day or two if his Cr returns to baseline [nearly there])  - Cover with Q4H medium sliding scale insulin for now (while NPO)   - Once able to tolerate PO intake again, can transition back to TID AC + at bedtime   - BG checks Q4H while NPO for pending procedure tomorrow   - Once able to tolerate PO intake again, can transition back to TID AC + at bedtime + 0200  - Hypoglyemia protocol     BPH  - Continue PTA flomax        Diet: Advance Diet as Tolerated: Clear Liquid Diet; Regular Diet Adult    DVT Prophylaxis: Pneumatic Compression Devices  Calderon Catheter: Not present  Lines: None     Cardiac Monitoring: None  Code Status: Full Code      Clinically Significant Risk Factors        # Hypokalemia: Lowest K = 3 mmol/L in last 2 days, will replace as needed   # Hypochloremia: Lowest Cl = 97 mmol/L in last 2 days, will monitor as appropriate    # Hypomagnesemia: Lowest Mg = 1.6 mg/dL in last 2 days, will replace as needed       # Hypertension: Noted on problem list    # Chronic heart failure with preserved ejection fraction: heart failure noted on problem list and last echo with EF >50%          # DMII: A1C = 7.1 % (Ref range: <=5.7 %) within past 6 months   # Overweight: Estimated body mass index is 25.18 kg/m  as calculated from the following:    Height as of an earlier encounter on 5/9/25: 1.676 m (5' 6\").    Weight as of an earlier encounter on 5/9/25: 70.8 kg (156 lb).             Social Drivers of Health    Physical Activity: Inactive (12/16/2024)    Exercise Vital Sign " "    Days of Exercise per Week: 0 days     Minutes of Exercise per Session: 0 min   Stress: Stress Concern Present (12/16/2024)    [employer] [place] [employer] - Occupational Stress Questionnaire     Feeling of Stress : Rather much   Social Connections: Unknown (12/16/2024)    Social Connection and Isolation Panel [NHANES]     Frequency of Social Gatherings with Friends and Family: More than three times a week          Disposition Plan     Medically Ready for Discharge: Anticipated in 2-4 Days       The patient's care was discussed with the Attending Physician, Dr. Lily Bucio, Bedside Nurse, Patient, and GI Consultant(s).    Sang Waldron PA-C  Hospitalist Service, GOLD TEAM [address] [place] [address]  Securely message with [employer] (more info)  Text page via [employer] Paging/Directory   See signed in provider for up to date coverage information  ______________________________________________________________________    Interval History   Pt seen in his room this AM. He jokes about his bowel prep and how much he is needing \"to go\" from this. He otherwise denies abdominal pain, nausea, vomiting, urinary changes, chest pain, dyspnea, lightheadedness, BLE pain or swelling.    Physical Exam   Vital Signs: Temp: 98.7  F (37.1  C) Temp src: Oral BP: 109/51 Pulse: 85   Resp: 13 SpO2: 99 % O2 Device: Nasal cannula Oxygen Delivery: 2 LPM  Weight: 0 lbs 0 oz    Constitutional: awake, alert, cooperative, no apparent distress, and appears stated age. Sitting upright at edge of bed.   Eyes: lids and lashes normal, sclera clear, and conjunctiva normal  ENT: normocephalic, without obvious abnormality  Respiratory: No increased work of breathing, good air exchange, clear to auscultation bilaterally, no crackles or wheezing  Cardiovascular: regular rate and rhythm, normal S1 and S2, no S3, no S4, and no murmur noted  GI: normal bowel sounds, soft, non-distended, and non-tender  Skin: no " bruising or bleeding, no redness, warmth, or swelling, no rashes, and no lesions on visualized skin.   Musculoskeletal: no lower extremity pitting edema present, no deformities, no calf tenderness.  Neurologic: Moving all extremities equally and spontaneously. No obvious focal neuro deficits.  Neuropsychiatric: General: normal, calm, and normal eye contact  Level of consciousness: alert / normal  Affect: normal and pleasant  Memory and insight: normal, memory for past and recent events intact, and thought process normal    Medical Decision Making       50 MINUTES SPENT BY ME on the date of service doing chart review, history, exam, documentation & further activities per the note.      Data     I have personally reviewed the following data over the past 24 hrs:    7.4  \   8.6 (L)   / 201     139 97 (L) 63.0 (H) /  118 (H)   3.3 (L); 3.3 (L) 31 (H) 1.90 (H) \     ALT: 10 AST: 22 AP: 116 TBILI: 0.3   ALB: 3.9 TOT PROTEIN: 6.3 (L) LIPASE: N/A     INR:  1.13 PTT:  N/A   D-dimer:  N/A Fibrinogen:  N/A       Imaging results reviewed over the past 24 hrs:   No results found for this or any previous visit (from the past 24 hours).  Recent Labs   Lab 05/14/25  0408 05/14/25  0355 05/14/25  0022 05/13/25  2152 05/13/25  0755 05/13/25  0601 05/13/25  0559 05/12/25  0754 05/12/25  0335   WBC  --  7.4  --   --   --  6.4  --   --  7.8   HGB  --  8.6*  --   --   --  8.9*  --   --  8.9*   MCV  --  85  --   --   --  79  --   --  79   PLT  --  201  --   --   --  206  --   --  213   INR  --  1.13  --   --   --   --   --   --   --    NA  --  139  --   --   --   --  141  --  135   POTASSIUM  --  3.3*  3.3*  --  3.3*  --   --  3.0*  --  3.5  3.5   CHLORIDE  --  97*  --   --   --   --  99  --  96*   CO2  --  31*  --   --   --   --  29  --  23   BUN  --  63.0*  --   --   --   --  85.3*  --  104.0*   CR  --  1.90*  --   --   --   --  2.45*  --  3.27*   ANIONGAP  --  11  --   --   --   --  13  --  16*   BRYANNA  --  9.0  --   --   --   --   8.7*  --  8.3*   * 121* 124*  --    < >  --  156*   < > 113*   ALBUMIN  --  3.9  --   --   --   --  3.8  --   --    PROTTOTAL  --  6.3*  --   --   --   --  6.1*  --   --    BILITOTAL  --  0.3  --   --   --   --  0.2  --   --    ALKPHOS  --  116  --   --   --   --  114  --   --    ALT  --  10  --   --   --   --  9  --   --    AST  --  22  --   --   --   --  17  --   --     < > = values in this interval not displayed.

## 2025-05-15 VITALS
DIASTOLIC BLOOD PRESSURE: 62 MMHG | TEMPERATURE: 98.5 F | RESPIRATION RATE: 16 BRPM | SYSTOLIC BLOOD PRESSURE: 123 MMHG | HEART RATE: 89 BPM | OXYGEN SATURATION: 99 %

## 2025-05-15 LAB
ANION GAP SERPL CALCULATED.3IONS-SCNC: 9 MMOL/L (ref 7–15)
BASOPHILS # BLD AUTO: 0 10E3/UL (ref 0–0.2)
BASOPHILS NFR BLD AUTO: 0 %
BUN SERPL-MCNC: 38 MG/DL (ref 8–23)
CALCIUM SERPL-MCNC: 8.7 MG/DL (ref 8.8–10.4)
CHLORIDE SERPL-SCNC: 104 MMOL/L (ref 98–107)
CREAT SERPL-MCNC: 1.6 MG/DL (ref 0.67–1.17)
EGFRCR SERPLBLD CKD-EPI 2021: 46 ML/MIN/1.73M2
EOSINOPHIL # BLD AUTO: 0.1 10E3/UL (ref 0–0.7)
EOSINOPHIL NFR BLD AUTO: 2 %
ERYTHROCYTE [DISTWIDTH] IN BLOOD BY AUTOMATED COUNT: 15.6 % (ref 10–15)
GLUCOSE BLDC GLUCOMTR-MCNC: 104 MG/DL (ref 70–99)
GLUCOSE BLDC GLUCOMTR-MCNC: 196 MG/DL (ref 70–99)
GLUCOSE BLDC GLUCOMTR-MCNC: 196 MG/DL (ref 70–99)
GLUCOSE BLDC GLUCOMTR-MCNC: 197 MG/DL (ref 70–99)
GLUCOSE SERPL-MCNC: 109 MG/DL (ref 70–99)
HCO3 SERPL-SCNC: 29 MMOL/L (ref 22–29)
HCT VFR BLD AUTO: 24.6 % (ref 40–53)
HGB BLD-MCNC: 7.8 G/DL (ref 13.3–17.7)
HOLD SPECIMEN: NORMAL
IMM GRANULOCYTES # BLD: 0 10E3/UL
IMM GRANULOCYTES NFR BLD: 0 %
LYMPHOCYTES # BLD AUTO: 0.9 10E3/UL (ref 0.8–5.3)
LYMPHOCYTES NFR BLD AUTO: 15 %
MAGNESIUM SERPL-MCNC: 2 MG/DL (ref 1.7–2.3)
MCH RBC QN AUTO: 27.6 PG (ref 26.5–33)
MCHC RBC AUTO-ENTMCNC: 31.7 G/DL (ref 31.5–36.5)
MCV RBC AUTO: 87 FL (ref 78–100)
MONOCYTES # BLD AUTO: 0.6 10E3/UL (ref 0–1.3)
MONOCYTES NFR BLD AUTO: 9 %
NEUTROPHILS # BLD AUTO: 4.6 10E3/UL (ref 1.6–8.3)
NEUTROPHILS NFR BLD AUTO: 74 %
NRBC # BLD AUTO: 0 10E3/UL
NRBC BLD AUTO-RTO: 0 /100
PATH REPORT.COMMENTS IMP SPEC: NORMAL
PATH REPORT.FINAL DX SPEC: NORMAL
PATH REPORT.GROSS SPEC: NORMAL
PATH REPORT.RELEVANT HX SPEC: NORMAL
PHOSPHATE SERPL-MCNC: 1.8 MG/DL (ref 2.5–4.5)
PHOSPHATE SERPL-MCNC: 2.6 MG/DL (ref 2.5–4.5)
PHOTO IMAGE: NORMAL
PLATELET # BLD AUTO: 180 10E3/UL (ref 150–450)
POTASSIUM SERPL-SCNC: 4 MMOL/L (ref 3.4–5.3)
RBC # BLD AUTO: 2.83 10E6/UL (ref 4.4–5.9)
SODIUM SERPL-SCNC: 142 MMOL/L (ref 135–145)
WBC # BLD AUTO: 6.2 10E3/UL (ref 4–11)

## 2025-05-15 PROCEDURE — 84132 ASSAY OF SERUM POTASSIUM: CPT | Performed by: STUDENT IN AN ORGANIZED HEALTH CARE EDUCATION/TRAINING PROGRAM

## 2025-05-15 PROCEDURE — 99207 PR APP CREDIT; MD BILLING SHARED VISIT: CPT

## 2025-05-15 PROCEDURE — 84100 ASSAY OF PHOSPHORUS: CPT | Performed by: STUDENT IN AN ORGANIZED HEALTH CARE EDUCATION/TRAINING PROGRAM

## 2025-05-15 PROCEDURE — 36415 COLL VENOUS BLD VENIPUNCTURE: CPT

## 2025-05-15 PROCEDURE — 250N000013 HC RX MED GY IP 250 OP 250 PS 637: Performed by: PHYSICIAN ASSISTANT

## 2025-05-15 PROCEDURE — 250N000012 HC RX MED GY IP 250 OP 636 PS 637

## 2025-05-15 PROCEDURE — 250N000013 HC RX MED GY IP 250 OP 250 PS 637

## 2025-05-15 PROCEDURE — 250N000012 HC RX MED GY IP 250 OP 636 PS 637: Performed by: PHYSICIAN ASSISTANT

## 2025-05-15 PROCEDURE — 36415 COLL VENOUS BLD VENIPUNCTURE: CPT | Performed by: STUDENT IN AN ORGANIZED HEALTH CARE EDUCATION/TRAINING PROGRAM

## 2025-05-15 PROCEDURE — 85004 AUTOMATED DIFF WBC COUNT: CPT

## 2025-05-15 PROCEDURE — 120N000011 HC R&B TRANSPLANT UMMC

## 2025-05-15 PROCEDURE — 250N000013 HC RX MED GY IP 250 OP 250 PS 637: Performed by: STUDENT IN AN ORGANIZED HEALTH CARE EDUCATION/TRAINING PROGRAM

## 2025-05-15 PROCEDURE — 99232 SBSQ HOSP IP/OBS MODERATE 35: CPT | Performed by: INTERNAL MEDICINE

## 2025-05-15 PROCEDURE — 83735 ASSAY OF MAGNESIUM: CPT | Performed by: STUDENT IN AN ORGANIZED HEALTH CARE EDUCATION/TRAINING PROGRAM

## 2025-05-15 PROCEDURE — 99232 SBSQ HOSP IP/OBS MODERATE 35: CPT | Mod: FS | Performed by: STUDENT IN AN ORGANIZED HEALTH CARE EDUCATION/TRAINING PROGRAM

## 2025-05-15 PROCEDURE — 258N000003 HC RX IP 258 OP 636: Performed by: PHYSICIAN ASSISTANT

## 2025-05-15 PROCEDURE — 84100 ASSAY OF PHOSPHORUS: CPT

## 2025-05-15 RX ADMIN — SIROLIMUS 3 MG: 2 TABLET ORAL at 08:24

## 2025-05-15 RX ADMIN — INSULIN ASPART 2 UNITS: 100 INJECTION, SOLUTION INTRAVENOUS; SUBCUTANEOUS at 17:01

## 2025-05-15 RX ADMIN — FERROUS SULFATE TAB 325 MG (65 MG ELEMENTAL FE) 325 MG: 325 (65 FE) TAB at 17:07

## 2025-05-15 RX ADMIN — ASPIRIN 81 MG CHEWABLE TABLET 81 MG: 81 TABLET CHEWABLE at 08:25

## 2025-05-15 RX ADMIN — TAMSULOSIN HYDROCHLORIDE 0.4 MG: 0.4 CAPSULE ORAL at 08:24

## 2025-05-15 RX ADMIN — FUROSEMIDE 20 MG: 20 TABLET ORAL at 08:24

## 2025-05-15 RX ADMIN — Medication 1 TABLET: at 08:24

## 2025-05-15 RX ADMIN — POTASSIUM & SODIUM PHOSPHATES POWDER PACK 280-160-250 MG 2 PACKET: 280-160-250 PACK at 10:45

## 2025-05-15 RX ADMIN — FERROUS SULFATE TAB 325 MG (65 MG ELEMENTAL FE) 325 MG: 325 (65 FE) TAB at 08:25

## 2025-05-15 RX ADMIN — CALCIUM CARBONATE 600 MG (1,500 MG)-VITAMIN D3 400 UNIT TABLET 1 TABLET: at 17:06

## 2025-05-15 RX ADMIN — CALCIUM CARBONATE 600 MG (1,500 MG)-VITAMIN D3 400 UNIT TABLET 1 TABLET: at 08:25

## 2025-05-15 RX ADMIN — MYCOPHENOLATE MOFETIL 750 MG: 250 CAPSULE ORAL at 08:23

## 2025-05-15 RX ADMIN — PANTOPRAZOLE SODIUM 40 MG: 40 TABLET, DELAYED RELEASE ORAL at 08:25

## 2025-05-15 RX ADMIN — ROSUVASTATIN CALCIUM 10 MG: 10 TABLET, FILM COATED ORAL at 08:24

## 2025-05-15 RX ADMIN — POTASSIUM & SODIUM PHOSPHATES POWDER PACK 280-160-250 MG 2 PACKET: 280-160-250 PACK at 08:25

## 2025-05-15 RX ADMIN — SODIUM CHLORIDE, SODIUM LACTATE, POTASSIUM CHLORIDE, AND CALCIUM CHLORIDE: .6; .31; .03; .02 INJECTION, SOLUTION INTRAVENOUS at 03:46

## 2025-05-15 RX ADMIN — POTASSIUM & SODIUM PHOSPHATES POWDER PACK 280-160-250 MG 2 PACKET: 280-160-250 PACK at 15:52

## 2025-05-15 RX ADMIN — LATANOPROST 1 DROP: 50 SOLUTION OPHTHALMIC at 22:00

## 2025-05-15 RX ADMIN — LISINOPRIL 5 MG: 5 TABLET ORAL at 08:24

## 2025-05-15 RX ADMIN — MAGNESIUM 64 MG (MAGNESIUM CHLORIDE) TABLET,DELAYED RELEASE 535 MG: at 08:24

## 2025-05-15 RX ADMIN — ACETAMINOPHEN 975 MG: 325 TABLET ORAL at 08:24

## 2025-05-15 RX ADMIN — MYCOPHENOLATE MOFETIL 750 MG: 250 CAPSULE ORAL at 17:07

## 2025-05-15 RX ADMIN — INSULIN ASPART 2 UNITS: 100 INJECTION, SOLUTION INTRAVENOUS; SUBCUTANEOUS at 12:04

## 2025-05-15 ASSESSMENT — ACTIVITIES OF DAILY LIVING (ADL)
ADLS_ACUITY_SCORE: 43
DEPENDENT_IADLS:: INDEPENDENT
ADLS_ACUITY_SCORE: 43

## 2025-05-15 NOTE — PLAN OF CARE
Goal Outcome Evaluation:      Plan of Care Reviewed With: patient    Overall Patient Progress: improvingOverall Patient Progress: improving    Outcome Evaluation: Denies pain and nausea. IV fluids stopped this AM      /57 (BP Location: Left arm)   Pulse 80   Temp 98.3  F (36.8  C) (Oral)   Resp 16   SpO2 100%       [id number]  AVSS on RA. BG ACHS (100's-190's). Denies pain and nausea. Up ad fidelia; tolerates activity well. Regular diet; good appetite and PO intake. IV fluids d/c'd. Left PIV saline locked. Patients loose BM's have stopped! No BM yet today. Adequate urine output- started saving in hat in bathroom today. RN managed potassium (4.0) and phosphorus. Phos (1.8) replaced with (2) PO packets (3x daily). Wife by bedside this afternoon- good support system and involved in care. Continue plan of care, please notify MD with any changes.

## 2025-05-15 NOTE — PLAN OF CARE
Goal Outcome Evaluation:      Plan of Care Reviewed With: patient    Overall Patient Progress: improving    Outcome Evaluation: AxO4, denies pain & nausea      /46 (BP Location: Left arm)   Pulse 85   Temp 98.3  F (36.8  C) (Oral)   Resp 18   SpO2 100%     Shift: 6450-4984  VS: VSS on RA, afebrile  Neuro: Aox4  BG: ACHS 185  Labs: Cr 1.60, Phosphorus 1.8  Pain/Nausea: Denied  Diet: Regular   IV Access: L PIV   Infusion(s): LR @ 100 mL/hr   Lines/Drains: N/A  GI/: Voids. No BM this shift.   Skin: WDL  Mobility: UAL/SBA   Plan: Continue with POC

## 2025-05-15 NOTE — CONSULTS
Care Management Initial Consult    General Information  Assessment completed with: Patient, Spouse or significant other,    Type of CM/SW Visit: Progression of Care    Patient is s/p heat transplant on July 19th, 2020.    Primary Care Provider verified and updated as needed: Yes   Readmission within the last 30 days: no previous admission in last 30 days      Reason for Consult:  (ERS)  Advance Care Planning: Advance Care Planning Reviewed: present on chart        Communication Assessment  Patient's communication style: spoken language (English or Bilingual)    Hearing Difficulty or Deaf: no   Wear Glasses or Blind: no    Cognitive  Cognitive/Neuro/Behavioral: WDL                      Living Environment:   People in home: spouse  Shivani  Current living Arrangements: house      Able to return to prior arrangements: yes       Family/Social Support:  Care provided by: self  Provides care for: no one  Marital Status:   Support system: Wife, Children          Description of Support System: Supportive, Involved    Support Assessment: Adequate family and caregiver support    Current Resources:   Patient receiving home care services: No     Community Resources: None  Equipment currently used at home: grab bar, toilet, grab bar, tub/shower, shower chair  Supplies currently used at home:      Employment/Financial:  Employment Status: retired        Financial Concerns: none   Referral to Financial Worker: No       Does the patient's insurance plan have a 3 day qualifying hospital stay waiver?  Yes     Which insurance plan 3 day waiver is available? Alternative insurance waiver    Will the waiver be used for post-acute placement? No    Lifestyle & Psychosocial Needs:  Social Drivers of Health     Food Insecurity: Low Risk  (5/12/2025)    Food Insecurity     Within the past 12 months, did you worry that your food would run out before you got money to buy more?: No     Within the past 12 months, did the food you bought just  not last and you didn t have money to get more?: No   Depression: Not at risk (5/7/2025)    PHQ-2     PHQ-2 Score: 0   Housing Stability: Low Risk  (5/12/2025)    Housing Stability     Do you have housing? : Yes     Are you worried about losing your housing?: No   Tobacco Use: Low Risk  (5/9/2025)    Patient History     Smoking Tobacco Use: Never     Smokeless Tobacco Use: Never     Passive Exposure: Never   Financial Resource Strain: Low Risk  (5/12/2025)    Financial Resource Strain     Within the past 12 months, have you or your family members you live with been unable to get utilities (heat, electricity) when it was really needed?: No   Alcohol Use: Not on file   Transportation Needs: Low Risk  (5/12/2025)    Transportation Needs     Within the past 12 months, has lack of transportation kept you from medical appointments, getting your medicines, non-medical meetings or appointments, work, or from getting things that you need?: No   Physical Activity: Inactive (12/16/2024)    Exercise Vital Sign     Days of Exercise per Week: 0 days     Minutes of Exercise per Session: 0 min   Interpersonal Safety: Low Risk  (5/12/2025)    Interpersonal Safety     Do you feel physically and emotionally safe where you currently live?: Yes     Within the past 12 months, have you been hit, slapped, kicked or otherwise physically hurt by someone?: No     Within the past 12 months, have you been humiliated or emotionally abused in other ways by your partner or ex-partner?: No   Stress: Stress Concern Present (12/16/2024)    [place] [employer]ational [employer] - Occupational Stress Questionnaire     Feeling of Stress : Rather much   Social Connections: Unknown (12/16/2024)    Social Connection and Isolation Panel [NHANES]     Frequency of Communication with Friends and Family: Not on file     Frequency of Social Gatherings with Friends and Family: More than three times a week     Attends Jehovah's witness Services: Not on file     Active  Member of Clubs or Organizations: Not on file     Attends Club or Organization Meetings: Not on file     Marital Status: Not on file   Health Literacy: Not on file       Functional Status:  Prior to admission patient needed assistance:   Dependent ADLs:: Independent  Dependent IADLs:: Independent  Assesssment of Functional Status: At functional baseline    Mental Health Status:  Mental Health Status: No Current Concerns       Chemical Dependency Status:  Chemical Dependency Status: No Current Concerns             Values/Beliefs:  Spiritual, Cultural Beliefs, Sikh Practices, Values that affect care: no               Discussed  Partnership in Safe Discharge Planning  document with patient/family: No    Additional Information:  Patient is known to Transplant Program due to being s/p Heart Transplant on 7/19/2025. Patient admitted to [facility] for further work up and care of acute diarrheal illness causing dehydration, electrolyte abnormalities, and mild heart strain.     Met with patient and wife in person at the bedside, with phone interpretor. SW introduced self to patient and provided contact information to patient and his wife.     Patient and wife expressed no concerns or needs at this point in time. Patient does not currently have any home care or community cares that he is receiving. Patient does not have any concerns surrounding mental health, chemical dependency, or financials.     Patient anticipated to be medically ready within the next couple of days. Anticipate patient will be able to discharge home with no needs at this time. Wife will provide transportation at discharge.    Next Steps: Please consult [facility] if needs were to arise. No anticipated discharge needs at this time, though will need an IMM at discharge. RNCC notified.    KELI Partida, SW  Heart Transplant/[facility]   [facility]  Ph. [phone]

## 2025-05-15 NOTE — PROGRESS NOTES
"Brief GI note     Noted pathology results from the colon \"Sections of terminal ileum and random colon show focal neutrophilic cryptitis, mild architectural changes, and rare crypt apoptosis that are nondiagnostic of MMF toxicity. CMV immunohistochemical stains are pending. \"       - Recommend to confirm with pathology if this would still represent MMF toxicity.   - Await CMV stain   - GI will sign off       James Keane MD   GI Fellow     "

## 2025-05-15 NOTE — PROGRESS NOTES
[facility] EMERGENCY DEPARTMENT  [address] [facility] [address]  Phone: [phone]    Patient:  Lucian Oliveira, Date of birth 1955  Date of Visit:  05/15/2025  Referring Provider No ref. provider found  Reason for visit: diarrhea    Assessment & Plan    Recommendations:   Cardiology attempting to confirming the colonoscopy results with pathology. Will change immunosuppression from Mycophenolate to azathioprine if there is a concern for MMF induced colitis. CMV stains from colonoscopy path are pending.   Continue tmp/smx prophylaxis  Transplant ID will follow up the CMV stain on pathology. Call otherwise with questions. Discussed with patient, hospitalist, cardiology, GI.    Alicia Adkins D.O.   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     Diarrhea, stable to improved: acute onset 5/2 with liquid stools, 6-7 per day. C diff, enteric pathogen panel, EBV, CMV negative. No unusual exposures to uncooked food, untreated water, travel, or sick contacts. CMV D+/R+ - no history of CMV reactivation. 5/9 CT without small bowel wall thickness, diverticulitis, cholecystitis, or intraabdominal process. It does mention fluid filled colon but this is not specific, radiologist suggests enterocolitis. Last colonoscopy was in 10/2024 but was with poor prep. 5/14 colonoscopy pathology (multiple biopsies) - sections of terminal ileum and random colon show focal neutrophilic cryptitis, mild architectural changes, and rare crypt apoptosis.Cardiology is attempting to confirm with pathology if this is c/w MMF toxicity.     DAYA, improved: likely due to diarrhea. Nephrology is following.     Transplant check list:  Current immunosuppression: sirolimus, mycophenolate (dose reduced by  cards)  Viral serostatus: CMV D+/R+, EBV D+/R+, HSV 1+/2-, VZV +  Other serostatus: Toxoplasma D+/R-, on bactrim  Fungal Prophylaxis: none  Bacterial prophylaxis: none  PJP prophylaxis: tmp/smx  Immunizations:RSV vaccine candidate  Ig25 IgG 758  Qtc: 442 ms on 25     History of Present Illness   Pertinent history obtain from: chart review and patient  Colonoscopy performed yesterday, pathology reviewed with cards, GI, and hospitalist. Confirming results with path.  Creatinine improved. No new imaging.   Per patient he only had 1 bowel movement so far. Eating well. No fevers, chills, night sweats.      Physical Exam     Vital signs:  /56 (BP Location: Left arm)   Pulse 81   Temp 97.4  F (36.3  C) (Oral)   Resp 16   SpO2 100%     GENERAL: pleasant, sitting on the side of the bed, well appearing, finished lunch tray  RESP: RA, no accessory muscle   ABDOMEN: non-distended  MS: no gross musculoskeletal defects noted  SKIN: peripheral IV in place w/o surrounding erythema  NEURO: Normal strength and tone, mentation intact and speech normal    Data   Laboratory data and imaging listed below was reviewed prior to this encounter.     Microbiology:    Culture   Date Value Ref Range Status   10/11/2023 No Growth  Final   10/11/2023 No Growth  Final   , Urine Studies:    Recent Labs   Lab Test 05/10/25  0256 24  1306 10/12/23  0258 23  1226 21  0926   LEUKEST Negative Negative Negative Negative Negative   WBCU 3 1 3 <1 0   , Hematology Studies:    Recent Labs   Lab Test 05/15/25  0556 25  0355 25  0601 25  0335 25  0635 05/10/25  0931 25  1625 24  1159 24  0711 24  0736 24  1033 23  0556 23  1914   WBC 6.2 7.4 6.4 7.8 8.0 9.2 11.5*   < > 6.6  --  3.7*   < > 4.2   ANEU 4.6 5.4  --   --   --   --  9.8*  --  4.7  --  2.2  --  3.0   AEOS 0.1 0.1  --   --   --   --  0.0  --  0.1  --  0.1  --  0.1   HGB 7.8* 8.6* 8.9* 8.9* 10.0*  10.1* 11.0*   < > 10.7*   < > 10.2*   < > 5.8*   MCV 87 85 79 79 79 79 80   < > 91  --  90   < > 89    201 206 213 249 272 323   < > 219  --  188   < > 165    < > = values in this interval not displayed.    , Metabolic Studies:   Recent Labs   Lab Test 05/15/25  0556 05/14/25  1416 05/14/25  1044 05/14/25  0355 05/13/25  2152 05/13/25  0559 05/12/25  0335 05/11/25  1323 05/11/25  0635     --   --  139  --  141 135  --  131*   POTASSIUM 4.0 4.1 4.2 3.3*  3.3* 3.3* 3.0* 3.5  3.5   < > 3.0*   CHLORIDE 104  --   --  97*  --  99 96*  --  98   CO2 29  --   --  31*  --  29 23  --  16*   BUN 38.0*  --   --  63.0*  --  85.3* 104.0*  --  105.0*   CR 1.60*  --   --  1.90*  --  2.45* 3.27*  --  4.00*   GFRESTIMATED 46*  --   --  37*  --  27* 19*  --  15*    < > = values in this interval not displayed.   , Hepatic Studies:   Recent Labs   Lab Test 05/14/25  0355 05/13/25  0559 05/09/25  1625 12/05/24  1159 07/22/24  0711 04/16/24  1033 01/15/24  1511 11/16/23  1914 10/25/23  1005   BILITOTAL 0.3 0.2 0.3  --  0.3 0.3  --  0.2 0.2   ALKPHOS 116 114 174*  --  119 110  --  129 138*   ALBUMIN 3.9 3.8 4.6 4.0 3.8 4.0   < > 3.6 3.7   AST 22 17 13  --  15 15  --   --  17   ALT 10 9 9  --  8 9  --   --  13    < > = values in this interval not displayed.   , HSV 1/2 IgG:   Recent Labs   Lab Test 07/08/19  1021   H1IGG >8.0*   H2IGG <0.2   , CMV IgG:   Recent Labs   Lab Test 07/19/20  1613 07/08/19  1021   CMVIGG >8.0* >8.0*   , and EBV VCA:   Recent Labs   Lab Test 07/19/20  1613 07/08/19  1021   EBVCAG 3.2* 2.4*

## 2025-05-15 NOTE — PROGRESS NOTES
[facility]    Medicine Progress Note - Hospitalist Service, GOLD TEAM 6    Date of Admission:  5/9/2025    Assessment & Plan   Lucian Oliveira is a 69 year old male with a past medical history of ischemic cardiomyopathy (S/P OHT 2020), HTN, HLD, pHTN, CHANTEL, T2DM, and CKD. He initially presented to the ED for evaluation of diarrhea, and was found to have an DAYA. He was subsequently admitted given diarrhea of unclear etiology c/b DAYA. Remains admitted for further monitoring and management.     Acute Diarrhea, possibly medication-induced  Pt notes onset of diarrhea on 5/2/25 without any associated fevers, abdominal pain, nausea, or vomiting. No known recent sick exposures. Outpatient C diff PCR on 5/9/25 was negative. Presented to the ED where he has since been afebrile, VSS, and not leukocytotic. CT A/P showed fluid-filled colon which is non-specific, but was suggestive of possible enterocolitis. Enteric panel negative on 5/10. CMV, EBV PCRs negative. ID consulted, and no clear infectious source. 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 Cardiology then reduced MMF dose on 5/10, but has not yet had improvement in diarrhea. Has been on Mag Ox since 01/2024, which can contribute to diarrhea, so this was switched to an alternative agent as below.   - Cardiology requests colonoscopy to further evaluate  - GI signed off today   - Underwent combined EGD/Colonoscopy on 5/14/25   - EGD showed gastropathy w/ mild oozing of blood. Biopsies showed no e/o metaplasia or H. Pylori-like changes   - Colonoscopy showed small erosion in terminal ileum, but otherwise normal mucosa. Biopsies showed focal active colitis, but no e/o MMF-induced colitis, but GI is requesting that Cardiology team confirm this with Pathology (pending outcome of this discussion as of now)  - Transplant ID following, no additional recs  - Continue scheduled  imodium 2mg TID on hold, could consider resuming depending on if he develops recurrent diarrhea  - Continue Mag Chloride (switched from Mag Ox)  - Monitor stool output  - Encourage PO hydration  - CBC, BMP in AM     DAYA, suspect prerenal, resolved  CKD Stage 3b  Cr 4.88 on arrival (baseline 1.8-2.2). UA negative. Hyaline casts on urine micro and urine Na < 20 suggests hypovolemia. No evidence of hydro on recent CT. Renal function improved w IVF, however diarrhea persists. Repeat Cr back to baseline today; 1.60 today. BUN 38.0. No uremic symptoms. See below re: AGMA.   - Nephrology signed off   - Discontinued bicarb drip yesterday  - Avoid nephrotoxins  - Will resume Jardiance when taking better PO closer to discharge  - Resumed PTA Lisinopril daily, Lasix daily, and Bactrim today (Bactrim is dosed qMWF for PJP ppx)  - Repeat BMP in AM     Hyponatremia, resolved  Na 128 on arrival. Urine Na < 20. Likely volume depletion in setting of diarrhea. Na improved to 135 with IV fluids and stable this AM.  - BMP in AM     Hypokalemia, resolved   K 2.6 on arrival. Likely secondary to diarrhea, now complicated by treatment of metabolic acidosis causing intracellular shift.  - Continue RN replacement protocol  - BMP in AM    Hypomagnesemia, resolved  Mag 1.6 today, in the setting of bowel prep as above for colonoscopy. Repleted w/ Mag Sulfate 5/14, and Mag is WNL today.  - Trend Mag in AM as he begins to eat again     Anion Gap Metabolic Acidosis, improving  Possibly multifactorial with GI bicarb loss, uremia, and DAYA/CKD. Resolved with bicarb drip. CO2 31 on BMP today.  - Nephrology consulted, but now signed off  - Sodium bicarb drip stopped 5/13, and bicarb stable  - Trend BMP in AM     Hx ICM s/p OHT (2020)  No acute concerns. 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   - Continue reduced of MMF 750mg BID (50% home dose)  - Sirolimus 3mg daily  - Resumed PTA Bactrim (qMWF), Lasix daily, and Lisinopril daily today  -  Resume PTA Jardiance in AM when taking better PO closer to discharge   - Per OP notes, was supposed to increase from 10mg daily-->25mg daily but never did. Thus, once able to resume Jardiance, would start at 25mg daily   - Transplant Cardiology following, appreciate recommendations   - Discussed w/ their team today, they are reviewing case and will determine if any further changes to MMF needed     Type II NSTEMI, demand ischemia  Trop mildly elevated but down-trending. Suspect demand ischemia in setting of severe dehydration and DAYA. No chest pain. EKG negative.  - Monitor clinically  - No further trending of Trop necessary at this time unless clinically indicated     Chronic Normocytic Anemia, stable  Hgb at baseline on admission. No evidence of bleeding. Repeat Hgb 7.8 today. Suspect dilutional effect from IVF.  - Monitor clinically  - CBC in AM     Type 2 Diabetes Mellitus  PTA tresiba and jardiance at home.  - Holding PTA Jardiance d/t DAYA (will consider resuming once he is reliably eating more)  - Medium sliding scale insulin TID AC + at bedtime  - BG checks TID AC + at bedtime + 0200  - Hypoglyemia protocol     BPH  - Continue PTA flomax        Diet: Advance Diet as Tolerated: Regular Diet Adult; Regular Diet Adult    DVT Prophylaxis: Pneumatic Compression Devices  Calderon Catheter: Not present  Lines: None     Cardiac Monitoring: None  Code Status: Full Code      Clinically Significant Risk Factors        # Hypokalemia: Lowest K = 3.3 mmol/L in last 2 days, will replace as needed   # Hypochloremia: Lowest Cl = 97 mmol/L in last 2 days, will monitor as appropriate    # Hypomagnesemia: Lowest Mg = 1.6 mg/dL in last 2 days, will replace as needed       # Hypertension: Noted on problem list    # Chronic heart failure with preserved ejection fraction: heart failure noted on problem list and last echo with EF >50%          # DMII: A1C = 7.1 % (Ref range: <=5.7 %) within past 6 months   # Overweight: Estimated body  "mass index is 25.18 kg/m  as calculated from the following:    Height as of an earlier encounter on 5/9/25: 1.676 m (5' 6\").    Weight as of an earlier encounter on 5/9/25: 70.8 kg (156 lb).             Social Drivers of Health    Physical Activity: Inactive (12/16/2024)    Exercise Vital Sign     Days of Exercise per Week: 0 days     Minutes of Exercise per Session: 0 min   Stress: Stress Concern Present (12/16/2024)    [employer] [place] [employer] - Occupational Stress Questionnaire     Feeling of Stress : Rather much   Social Connections: Unknown (12/16/2024)    Social Connection and Isolation Panel [NHANES]     Frequency of Social Gatherings with Friends and Family: More than three times a week          Disposition Plan     Medically Ready for Discharge: Anticipated in 2-4 Days       The patient's care was discussed with the Attending Physician, Dr. Lily Bucio, Bedside Nurse, Patient, and GI, Cardiology, ID Consultant(s).    Sang Waldron PA-C  Hospitalist Service, [address]  Securely message with [employer] (more info)  Text page via [facility] Paging/Directory   See signed in provider for up to date coverage information  ______________________________________________________________________    Interval History   Pt seen in his room this AM. He is feeling well this morning following combined EGD/colonoscopy. Patient inquires about the results of the biopsies. Was able to eat breakfast without issues but \"doesn't know how it'll go in a few hours\" after eating (referring to possible recurrence of diarrhea). He otherwise denies abdominal pain, nausea, vomiting, urinary changes, chest pain, dyspnea, lightheadedness, BLE pain or swelling.    Physical Exam   Vital Signs: Temp: 98.3  F (36.8  C) Temp src: Oral BP: 128/57 Pulse: 80   Resp: 16 SpO2: 100 % O2 Device: None (Room air)    Weight: 0 lbs 0 oz    Constitutional: awake, alert, cooperative, no " apparent distress, and appears stated age. Lying supine in bed.   Eyes: lids and lashes normal, sclera clear, and conjunctiva normal  ENT: normocephalic, without obvious abnormality  Respiratory: No increased work of breathing, good air exchange, clear to auscultation bilaterally, no crackles or wheezing  Cardiovascular: regular rate and rhythm, normal S1 and S2, no S3, no S4, and no murmur noted  GI: normal bowel sounds, soft, non-distended, and non-tender  Skin: no bruising or bleeding, no redness, warmth, or swelling, no rashes, and no lesions on visualized skin.   Musculoskeletal: no lower extremity pitting edema present, no deformities, no calf tenderness.  Neurologic: Moving all extremities equally and spontaneously. No obvious focal neuro deficits.  Neuropsychiatric: General: normal, calm, and normal eye contact  Level of consciousness: alert / normal  Affect: normal and pleasant  Memory and insight: normal, memory for past and recent events intact, and thought process normal    Medical Decision Making       65 MINUTES SPENT BY ME on the date of service doing chart review, history, exam, documentation & further activities per the note.      Data     I have personally reviewed the following data over the past 24 hrs:    6.2  \   7.8 (L)   / 180     142 104 38.0 (H) /  196 (H)   4.0 29 1.60 (H) \       Imaging results reviewed over the past 24 hrs:   No results found for this or any previous visit (from the past 24 hours).  Recent Labs   Lab 05/15/25  1201 05/15/25  0739 05/15/25  0556 05/14/25  1620 05/14/25  1416 05/14/25  1044 05/14/25  0408 05/14/25  0355 05/13/25  0755 05/13/25  0601 05/13/25  0559   WBC  --   --  6.2  --   --   --   --  7.4  --  6.4  --    HGB  --   --  7.8*  --   --   --   --  8.6*  --  8.9*  --    MCV  --   --  87  --   --   --   --  85  --  79  --    PLT  --   --  180  --   --   --   --  201  --  206  --    INR  --   --   --   --   --   --   --  1.13  --   --   --    NA  --   --  142   --   --   --   --  139  --   --  141   POTASSIUM  --   --  4.0  --  4.1 4.2  --  3.3*  3.3*   < >  --  3.0*   CHLORIDE  --   --  104  --   --   --   --  97*  --   --  99   CO2  --   --  29  --   --   --   --  31*  --   --  29   BUN  --   --  38.0*  --   --   --   --  63.0*  --   --  85.3*   CR  --   --  1.60*  --   --   --   --  1.90*  --   --  2.45*   ANIONGAP  --   --  9  --   --   --   --  11  --   --  13   BRYANNA  --   --  8.7*  --   --   --   --  9.0  --   --  8.7*   * 104* 109*   < >  --   --    < > 121*   < >  --  156*   ALBUMIN  --   --   --   --   --   --   --  3.9  --   --  3.8   PROTTOTAL  --   --   --   --   --   --   --  6.3*  --   --  6.1*   BILITOTAL  --   --   --   --   --   --   --  0.3  --   --  0.2   ALKPHOS  --   --   --   --   --   --   --  116  --   --  114   ALT  --   --   --   --   --   --   --  10  --   --  9   AST  --   --   --   --   --   --   --  22  --   --  17    < > = values in this interval not displayed.

## 2025-05-15 NOTE — PLAN OF CARE
Goal Outcome Evaluation:         /56 (BP Location: Left arm)   Pulse 81   Temp 97.4  F (36.3  C) (Oral)   Resp 16   SpO2 100%     Shift: [id number]  Isolation Status: ***  VS: *** on ***, afebrile  Neuro: Aox4  Behaviors: calm and cooperative  BG: ACHS  Labs: ***  Respiratory: WDL  Cardiac: WDL  Pain/Nausea: denies  PRN: ***  Diet: Regular  IV Access: ***  Infusion(s): ***  Lines/Drains: ***  GI/: ***  Skin: ***  Mobility: ***  Events/Education: ***  Plan: ***

## 2025-05-15 NOTE — PROGRESS NOTES
Cardiology Progress Note    Date of Service: 05/14/2025  Patient: Lucian Oliveira  MRN: [id number]  Admission Date: 5/9/2025  Hospital Day: 5    Cardiology Problem List:  Ischemic Cardiomyopathy s/p OHT 7/19/2020  primary graft dysfunction  with recovered graft function  history of HIT status post Plex  Diabetes Mellitus, Type II  hypertension  hyperlipidemia  retinal detachment status post vitrectomy in December 2020  Pulmonary HTN, WHO class    Diarrhea, acute  DAYA  Hypokalemia  Hyponatremia    Assessment:   Lucian Oliveira is a 69 year old male with a past medical history significant for CAD s/p CABG in 2008, ICM and s/p orthotopic heart transplant in 7/2020, HTN, HLD, mild pulmonary HTN, CHANTEL, HIT s/p PLEX, CKD, Type II DM, vitreous hemorrhage, Norovirus and EAEC (2024 type 2 diabetes, and CKD stage 3, admitted 5/9/25 p/w diarrhea x 8 days with intermittent vomiting, mild abdominal pain and poor po intake, found to have DAYA, hypokalemia, hyponatremia and mild leukocytosis, with CT abdomen/pelvis showing mild enterocolitis. Cardiology heart failure team consulted for management of immunosuppression.     Patient reports he has had watery diarrhea for 8 days, 7-8 episodes per day without bleeding. Mild leukocytosis. Enteric pathogen panel negative and c-diff negative. CT A/P showing normal caliber small bowel, duodenal and jejunal diverticula w/o inflammation, cholelithiasis and fluid in colon possibly representing enterocolitis. Infectious work-up negative to date with CMV and EBV negative. GI consulted for colonoscopy with biopsy to determine if MMF toxicity cause of diarrhea. Colonoscopy with biopsy completed today, awaiting pathology results to confirm MMF toxicity. If found, will likely switch to another cell cycle inhibitor.    Serostatus:   Viral serostatus: CMV D+/R+, EBV D+/R+, HSV 1+/2-, VZV +  Other serostatus: Toxoplasma D+/R-, on bactrim  Blood type: O positive      Immunosuppression:  -Calcineurin Inhibitor: Sirolimus 3 mg po daily, goal level 6-8  -Cell cycle Inhibitor: Mycophenolate 750 mg BID (PTA dose 1500 mg BID)  - No rejection history     Infection Prophylaxis:  Fungal Prophylaxis: none  Bacterial prophylaxis: none  PJP prophylaxis: tmp/smx  IgG:10/12/23 668  Qtc: 442 ms on 5/9/25     Recommendations:   Follow up with pathology results from colonoscopy biopsy to evaluate for MMF toxicity. If confirmed, will switch to another cell cycle inhibitor.  Continue decreased mycophenolate dose of 750 mg BID  Continue Sirolimus 3 mg po daily  Transplant infectious disease following for cause of diarrhea, appreciate recs  Infectious workup negative to date  We will continue to follow.  Rest of care per primary team.     Patient was seen and plan of care was discussed with attending physician Dr. Santos. Final recommendations pending Attending Attestation. Please don't hesitate to contact our team with any additional questions or concerns.     Maraino Patterson MD  Internal Medicine Resident, PGY-1    Subjective    Interval History:  No acute events overnight. This morning, patient eager to complete colonoscopy. No new complaints. Denies SOB, dizziness, CP, abdominal pain or cramping.    Review of Systems:  A comprehensive ROS was performed and found to be negative or non-contributory with the exception of that noted in the HPI above.    Current Facility-Administered Medications   Medication Dose Route Frequency Provider Last Rate Last Admin    acetaminophen (TYLENOL) tablet 975 mg  975 mg Oral TID Olga Denney PA   975 mg at 05/14/25 0825    aspirin (ASA) chewable tablet 81 mg  81 mg Oral Daily Olga Denney PA   81 mg at 05/14/25 0826    benzocaine-menthol (CHLORASEPTIC MAX) 15-10 MG lozenge 1 lozenge  1 lozenge Buccal Q1H PRN Olga Denney PA        calcium carbonate (TUMS) chewable tablet 1,000 mg  1,000 mg Oral 4x Daily PRN Olga Denney PA        calcium  carbonate-vitamin D (CALTRATE) 600-10 MG-MCG per tablet 1 tablet  1 tablet Oral BID w/meals Olga Denney PA   1 tablet at 05/14/25 1827    glucose gel 15-30 g  15-30 g Oral Q15 Min PRN Olga Denney PA        Or    dextrose 50 % injection 25-50 mL  25-50 mL Intravenous Q15 Min PRN Olga Denney PA        Or    glucagon injection 1 mg  1 mg Subcutaneous Q15 Min PRN Olga Denney PA        [Held by provider] empagliflozin (JARDIANCE) tablet 25 mg  25 mg Oral Daily Sang Waldron PA-C        fentaNYL (PF) (SUBLIMAZE) injection   Intravenous PRN Brendan Aldrich MD   25 mcg at 05/14/25 1309    ferrous sulfate (FEROSUL) tablet 325 mg  325 mg Oral BID w/meals Olga Denney PA   325 mg at 05/14/25 1827    [Held by provider] furosemide (LASIX) tablet 20 mg  20 mg Oral Daily Sang Waldron PA-C        insulin aspart (NovoLOG) injection (RAPID ACTING)  1-7 Units Subcutaneous TID AC Sang Waldron PA-C   2 Units at 05/12/25 1810    insulin aspart (NovoLOG) injection (RAPID ACTING)  1-5 Units Subcutaneous At Bedtime Sang Waldron PA-C   1 Units at 05/12/25 2217    lactated ringers infusion   Intravenous Continuous Cayetano Corona PA-C 100 mL/hr at 05/14/25 1805 New Bag at 05/14/25 1805    latanoprost (XALATAN) 0.005 % ophthalmic solution 1 drop  1 drop Both Eyes QPM Olga Denney PA   1 drop at 05/13/25 2014    lidocaine (LMX4) cream   Topical Q1H PRN Olga Denney PA        lidocaine 1 % 0.1-1 mL  0.1-1 mL Other Q1H PRN Olga Denney PA        [Held by provider] lisinopril (ZESTRIL) tablet 5 mg  5 mg Oral Daily Sang Waldron PA-C   5 mg at 05/10/25 0944    [Held by provider] loperamide (IMODIUM) capsule 2 mg  2 mg Oral TID Cayetano Corona PA-C   2 mg at 05/12/25 1423    magnesium chloride CR tablet 535 mg  535 mg Oral Daily Cayetano Corona PA-C   535 mg at 05/14/25 0822    midazolam (VERSED) injection   Intravenous PRN Brendan Aldrich MD   2 mg at  05/14/25 1310    multivitamin w/minerals (THERA-VIT-M) tablet 1 tablet  1 tablet Oral Daily Linda Martinez PA-C   1 tablet at 05/14/25 0825    mycophenolate (GENERIC EQUIVALENT) capsule 750 mg  750 mg Oral BID IS Castro Roberts MD   750 mg at 05/14/25 1827    ondansetron (ZOFRAN ODT) ODT tab 4 mg  4 mg Oral Q6H PRN Chucho Coreas PA-C        ondansetron (ZOFRAN) injection 4 mg  4 mg Intravenous Q6H PRN Chucho Coreas PA-C   4 mg at 05/11/25 1843    [START ON 5/15/2025] pantoprazole (PROTONIX) EC tablet 40 mg  40 mg Oral Daily Ritu Bucio MD        potassium & sodium phosphates (NEUTRA-PHOS) Packet 2 packet  2 packet Oral or Feeding Tube Q4H Mariano Patterson MD   2 packet at 05/14/25 1621    prochlorperazine (COMPAZINE) injection 5 mg  5 mg Intravenous Q6H PRN Chucho Coreas PA-C        Or    prochlorperazine (COMPAZINE) tablet 5 mg  5 mg Oral Q6H PRN Chucho Coreas PA-C        Or    prochlorperazine (COMPAZINE) suppository 12.5 mg  12.5 mg Rectal Q12H PRN Chucho Coreas PA-C        rosuvastatin (CRESTOR) tablet 10 mg  10 mg Oral Daily Olga Denney PA   10 mg at 05/14/25 0824    sirolimus (GENERIC EQUIVALENT) tablet 3 mg  3 mg Oral Daily Olga Denney PA   3 mg at 05/14/25 0822    sodium chloride (PF) 0.9% PF flush 3 mL  3 mL Intracatheter Q8H MUSA Olga Denney PA   3 mL at 05/12/25 2126    sodium chloride (PF) 0.9% PF flush 3 mL  3 mL Intracatheter q1 min prn Olga Denney PA   3 mL at 05/14/25 1553    sulfamethoxazole-trimethoprim (BACTRIM) 400-80 MG per tablet 1 tablet  1 tablet Oral Once per day on Monday Wednesday Friday Sang Waldron PA-C        tamsulosin (FLOMAX) capsule 0.4 mg  0.4 mg Oral Olga Kern PA   0.4 mg at 05/14/25 0824    traMADol (ULTRAM) half-tab 25 mg  25 mg Oral Q6H PRN Olga Denney PA   25 mg at 05/11/25 2029     Objective   Physical Exam:    Blood pressure 109/51, pulse 85, temperature 98.7  F (37.1  C), temperature source Oral, resp.  rate 13, SpO2 99%.    Exam:  General: NAD, resting comfortably in bed  HEENT: no scleral icterus or injection  CARDIAC: RRR, no murmurs. No JVD  RESP:  CTAB, no rales, wheezes or rhonchi or wheezes  GI: soft, nondistended, nontender, bowel sounds hypoactive,   : No villalobos  EXTREMITIES: no LE edema  SKIN: No acute lesions appreciated  NEURO: No focal deficits    Labs & Studies: I personally reviewed the following studies:  ROUTINE LABS (Last four results):  CMP  Recent Labs   Lab 05/14/25  1620 05/14/25  1416 05/14/25  1044 05/14/25  0408 05/14/25  0355 05/14/25  0022 05/13/25  2152 05/13/25  0755 05/13/25  0559 05/12/25  0754 05/12/25  0335 05/11/25  0825 05/11/25  0635 05/09/25  2147 05/09/25  1625   NA  --   --   --   --  139  --   --   --  141  --  135  --  131*   < > 128*   POTASSIUM  --  4.1 4.2  --  3.3*  3.3*  --  3.3*  --  3.0*  --  3.5  3.5   < > 3.0*   < > 2.6*   CHLORIDE  --   --   --   --  97*  --   --   --  99  --  96*  --  98   < > 92*   CO2  --   --   --   --  31*  --   --   --  29  --  23  --  16*   < > 13*   ANIONGAP  --   --   --   --  11  --   --   --  13  --  16*  --  17*   < > 23*   GLC 93  --   --  118* 121* 124*  --    < > 156*   < > 113*   < > 144*   < > 194*   BUN  --   --   --   --  63.0*  --   --   --  85.3*  --  104.0*  --  105.0*   < > 93.8*   CR  --   --   --   --  1.90*  --   --   --  2.45*  --  3.27*  --  4.00*   < > 4.88*   GFRESTIMATED  --   --   --   --  37*  --   --   --  27*  --  19*  --  15*   < > 12*   BRYANNA  --   --   --   --  9.0  --   --   --  8.7*  --  8.3*  --  8.4*   < > 8.6*   MAG  --   --   --   --  1.6*  --   --   --   --   --   --   --   --   --  1.7   PHOS  --   --   --   --  1.4*  --   --   --   --   --   --   --   --   --   --    PROTTOTAL  --   --   --   --  6.3*  --   --   --  6.1*  --   --   --   --   --  7.7   ALBUMIN  --   --   --   --  3.9  --   --   --  3.8  --   --   --   --   --  4.6   BILITOTAL  --   --   --   --  0.3  --   --   --  0.2  --   --   --    --   --  0.3   ALKPHOS  --   --   --   --  116  --   --   --  114  --   --   --   --   --  174*   AST  --   --   --   --  22  --   --   --  17  --   --   --   --   --  13   ALT  --   --   --   --  10  --   --   --  9  --   --   --   --   --  9    < > = values in this interval not displayed.     CBC  Recent Labs   Lab 05/14/25  0355 05/13/25  0601 05/12/25  0335 05/11/25  0635   WBC 7.4 6.4 7.8 8.0   RBC 3.10* 3.30* 3.33* 3.65*   HGB 8.6* 8.9* 8.9* 10.0*   HCT 26.3* 26.1* 26.2* 28.8*   MCV 85 79 79 79   MCH 27.7 27.0 26.7 27.4   MCHC 32.7 34.1 34.0 34.7   RDW 15.6* 15.7* 15.8* 15.8*    206 213 249     INR  Recent Labs   Lab 05/14/25  0355   INR 1.13       Imaging:  CT Abdomen Pelvis w/o Contrast   Final Result   IMPRESSION:    1. Fluid-filled colon which is nonspecific but can be seen with   enterocolitis. No additional acute findings in the abdomen or pelvis.   Normal appendix.   2. Cholelithiasis without evidence of cholecystitis.   3. Duodenal and jejunal diverticula without evidence of   diverticulitis.      I have personally reviewed the examination and initial interpretation   and I agree with the findings.      NIKOLE DAVILA MD            SYSTEM ID:  [id number]

## 2025-05-16 LAB
ANION GAP SERPL CALCULATED.3IONS-SCNC: 13 MMOL/L (ref 7–15)
BUN SERPL-MCNC: 35.9 MG/DL (ref 8–23)
CALCIUM SERPL-MCNC: 8.6 MG/DL (ref 8.8–10.4)
CHLORIDE SERPL-SCNC: 104 MMOL/L (ref 98–107)
CREAT SERPL-MCNC: 1.73 MG/DL (ref 0.67–1.17)
EGFRCR SERPLBLD CKD-EPI 2021: 42 ML/MIN/1.73M2
ERYTHROCYTE [DISTWIDTH] IN BLOOD BY AUTOMATED COUNT: 15.9 % (ref 10–15)
GLUCOSE BLDC GLUCOMTR-MCNC: 160 MG/DL (ref 70–99)
GLUCOSE BLDC GLUCOMTR-MCNC: 213 MG/DL (ref 70–99)
GLUCOSE BLDC GLUCOMTR-MCNC: 214 MG/DL (ref 70–99)
GLUCOSE BLDC GLUCOMTR-MCNC: 216 MG/DL (ref 70–99)
GLUCOSE BLDC GLUCOMTR-MCNC: 222 MG/DL (ref 70–99)
GLUCOSE SERPL-MCNC: 166 MG/DL (ref 70–99)
HCO3 SERPL-SCNC: 22 MMOL/L (ref 22–29)
HCT VFR BLD AUTO: 26.2 % (ref 40–53)
HGB BLD-MCNC: 8.1 G/DL (ref 13.3–17.7)
MAGNESIUM SERPL-MCNC: 1.5 MG/DL (ref 1.7–2.3)
MCH RBC QN AUTO: 26.8 PG (ref 26.5–33)
MCHC RBC AUTO-ENTMCNC: 30.9 G/DL (ref 31.5–36.5)
MCV RBC AUTO: 87 FL (ref 78–100)
MICROSPORID STL MOD TRI STAIN: NEGATIVE
PHOSPHATE SERPL-MCNC: 2.1 MG/DL (ref 2.5–4.5)
PLATELET # BLD AUTO: 192 10E3/UL (ref 150–450)
POTASSIUM SERPL-SCNC: 3.7 MMOL/L (ref 3.4–5.3)
POTASSIUM SERPL-SCNC: 3.7 MMOL/L (ref 3.4–5.3)
RBC # BLD AUTO: 3.02 10E6/UL (ref 4.4–5.9)
SODIUM SERPL-SCNC: 139 MMOL/L (ref 135–145)
WBC # BLD AUTO: 7.3 10E3/UL (ref 4–11)

## 2025-05-16 PROCEDURE — 250N000013 HC RX MED GY IP 250 OP 250 PS 637: Performed by: PHYSICIAN ASSISTANT

## 2025-05-16 PROCEDURE — 250N000013 HC RX MED GY IP 250 OP 250 PS 637

## 2025-05-16 PROCEDURE — 85027 COMPLETE CBC AUTOMATED: CPT | Performed by: STUDENT IN AN ORGANIZED HEALTH CARE EDUCATION/TRAINING PROGRAM

## 2025-05-16 PROCEDURE — 250N000012 HC RX MED GY IP 250 OP 636 PS 637

## 2025-05-16 PROCEDURE — 250N000013 HC RX MED GY IP 250 OP 250 PS 637: Performed by: STUDENT IN AN ORGANIZED HEALTH CARE EDUCATION/TRAINING PROGRAM

## 2025-05-16 PROCEDURE — 80048 BASIC METABOLIC PNL TOTAL CA: CPT

## 2025-05-16 PROCEDURE — 250N000012 HC RX MED GY IP 250 OP 636 PS 637: Performed by: PHYSICIAN ASSISTANT

## 2025-05-16 PROCEDURE — 99232 SBSQ HOSP IP/OBS MODERATE 35: CPT | Mod: FS | Performed by: STUDENT IN AN ORGANIZED HEALTH CARE EDUCATION/TRAINING PROGRAM

## 2025-05-16 PROCEDURE — 120N000005 HC R&B MS OVERFLOW UMMC

## 2025-05-16 PROCEDURE — 99207 PR APP CREDIT; MD BILLING SHARED VISIT: CPT

## 2025-05-16 PROCEDURE — 36415 COLL VENOUS BLD VENIPUNCTURE: CPT | Performed by: STUDENT IN AN ORGANIZED HEALTH CARE EDUCATION/TRAINING PROGRAM

## 2025-05-16 PROCEDURE — 83735 ASSAY OF MAGNESIUM: CPT | Performed by: STUDENT IN AN ORGANIZED HEALTH CARE EDUCATION/TRAINING PROGRAM

## 2025-05-16 PROCEDURE — 250N000012 HC RX MED GY IP 250 OP 636 PS 637: Performed by: STUDENT IN AN ORGANIZED HEALTH CARE EDUCATION/TRAINING PROGRAM

## 2025-05-16 PROCEDURE — 99231 SBSQ HOSP IP/OBS SF/LOW 25: CPT | Performed by: INTERNAL MEDICINE

## 2025-05-16 PROCEDURE — 250N000011 HC RX IP 250 OP 636

## 2025-05-16 PROCEDURE — 84100 ASSAY OF PHOSPHORUS: CPT | Performed by: STUDENT IN AN ORGANIZED HEALTH CARE EDUCATION/TRAINING PROGRAM

## 2025-05-16 PROCEDURE — 82374 ASSAY BLOOD CARBON DIOXIDE: CPT

## 2025-05-16 RX ORDER — POTASSIUM CHLORIDE 750 MG/1
20 TABLET, EXTENDED RELEASE ORAL ONCE
Status: DISCONTINUED | OUTPATIENT
Start: 2025-05-16 | End: 2025-05-17 | Stop reason: HOSPADM

## 2025-05-16 RX ORDER — AZATHIOPRINE 50 MG/1
50 TABLET ORAL DAILY
Status: DISCONTINUED | OUTPATIENT
Start: 2025-05-16 | End: 2025-05-17 | Stop reason: HOSPADM

## 2025-05-16 RX ORDER — LOPERAMIDE HYDROCHLORIDE 2 MG/1
2 CAPSULE ORAL 3 TIMES DAILY PRN
Status: DISCONTINUED | OUTPATIENT
Start: 2025-05-16 | End: 2025-05-17 | Stop reason: HOSPADM

## 2025-05-16 RX ORDER — MAGNESIUM SULFATE HEPTAHYDRATE 40 MG/ML
2 INJECTION, SOLUTION INTRAVENOUS ONCE
Status: COMPLETED | OUTPATIENT
Start: 2025-05-16 | End: 2025-05-16

## 2025-05-16 RX ORDER — POTASSIUM CHLORIDE 750 MG/1
20 TABLET, EXTENDED RELEASE ORAL ONCE
Status: COMPLETED | OUTPATIENT
Start: 2025-05-16 | End: 2025-05-16

## 2025-05-16 RX ADMIN — ACETAMINOPHEN 975 MG: 325 TABLET ORAL at 19:52

## 2025-05-16 RX ADMIN — TAMSULOSIN HYDROCHLORIDE 0.4 MG: 0.4 CAPSULE ORAL at 08:55

## 2025-05-16 RX ADMIN — ROSUVASTATIN CALCIUM 10 MG: 10 TABLET, FILM COATED ORAL at 08:52

## 2025-05-16 RX ADMIN — MYCOPHENOLATE MOFETIL 750 MG: 250 CAPSULE ORAL at 08:53

## 2025-05-16 RX ADMIN — MAGNESIUM SULFATE HEPTAHYDRATE 2 G: 2 INJECTION, SOLUTION INTRAVENOUS at 08:54

## 2025-05-16 RX ADMIN — FUROSEMIDE 20 MG: 20 TABLET ORAL at 08:53

## 2025-05-16 RX ADMIN — MAGNESIUM 64 MG (MAGNESIUM CHLORIDE) TABLET,DELAYED RELEASE 535 MG: at 08:53

## 2025-05-16 RX ADMIN — FERROUS SULFATE TAB 325 MG (65 MG ELEMENTAL FE) 325 MG: 325 (65 FE) TAB at 08:51

## 2025-05-16 RX ADMIN — LOPERAMIDE HYDROCHLORIDE 2 MG: 2 CAPSULE ORAL at 19:52

## 2025-05-16 RX ADMIN — LOPERAMIDE HYDROCHLORIDE 2 MG: 2 CAPSULE ORAL at 14:16

## 2025-05-16 RX ADMIN — POTASSIUM & SODIUM PHOSPHATES POWDER PACK 280-160-250 MG 1 PACKET: 280-160-250 PACK at 12:50

## 2025-05-16 RX ADMIN — PANTOPRAZOLE SODIUM 40 MG: 40 TABLET, DELAYED RELEASE ORAL at 08:52

## 2025-05-16 RX ADMIN — SIROLIMUS 3 MG: 2 TABLET ORAL at 08:51

## 2025-05-16 RX ADMIN — SULFAMETHOXAZOLE AND TRIMETHOPRIM 1 TABLET: 400; 80 TABLET ORAL at 09:39

## 2025-05-16 RX ADMIN — CALCIUM CARBONATE 600 MG (1,500 MG)-VITAMIN D3 400 UNIT TABLET 1 TABLET: at 17:27

## 2025-05-16 RX ADMIN — POTASSIUM & SODIUM PHOSPHATES POWDER PACK 280-160-250 MG 1 PACKET: 280-160-250 PACK at 16:14

## 2025-05-16 RX ADMIN — CALCIUM CARBONATE 600 MG (1,500 MG)-VITAMIN D3 400 UNIT TABLET 1 TABLET: at 08:53

## 2025-05-16 RX ADMIN — ACETAMINOPHEN 975 MG: 325 TABLET ORAL at 08:52

## 2025-05-16 RX ADMIN — INSULIN ASPART 2 UNITS: 100 INJECTION, SOLUTION INTRAVENOUS; SUBCUTANEOUS at 16:21

## 2025-05-16 RX ADMIN — POTASSIUM & SODIUM PHOSPHATES POWDER PACK 280-160-250 MG 1 PACKET: 280-160-250 PACK at 08:53

## 2025-05-16 RX ADMIN — INSULIN ASPART 2 UNITS: 100 INJECTION, SOLUTION INTRAVENOUS; SUBCUTANEOUS at 12:49

## 2025-05-16 RX ADMIN — Medication 1 TABLET: at 08:53

## 2025-05-16 RX ADMIN — FERROUS SULFATE TAB 325 MG (65 MG ELEMENTAL FE) 325 MG: 325 (65 FE) TAB at 17:27

## 2025-05-16 RX ADMIN — ASPIRIN 81 MG CHEWABLE TABLET 81 MG: 81 TABLET CHEWABLE at 08:53

## 2025-05-16 RX ADMIN — AZATHIOPRINE 50 MG: 50 TABLET ORAL at 19:52

## 2025-05-16 RX ADMIN — LATANOPROST 1 DROP: 50 SOLUTION OPHTHALMIC at 19:52

## 2025-05-16 RX ADMIN — ACETAMINOPHEN 325 MG: 325 TABLET ORAL at 14:06

## 2025-05-16 RX ADMIN — POTASSIUM CHLORIDE 20 MEQ: 750 TABLET, EXTENDED RELEASE ORAL at 14:05

## 2025-05-16 RX ADMIN — LISINOPRIL 5 MG: 5 TABLET ORAL at 08:52

## 2025-05-16 RX ADMIN — INSULIN ASPART 2 UNITS: 100 INJECTION, SOLUTION INTRAVENOUS; SUBCUTANEOUS at 08:53

## 2025-05-16 ASSESSMENT — ACTIVITIES OF DAILY LIVING (ADL)
ADLS_ACUITY_SCORE: 43
ADLS_ACUITY_SCORE: 44
ADLS_ACUITY_SCORE: 43
ADLS_ACUITY_SCORE: 44
ADLS_ACUITY_SCORE: 43
ADLS_ACUITY_SCORE: 44
ADLS_ACUITY_SCORE: 43

## 2025-05-16 NOTE — PLAN OF CARE
/63 (BP Location: Left arm)   Pulse 85   Temp 98.4  F (36.9  C) (Oral)   Resp 16   Wt 75.3 kg (166 lb)   SpO2 100%   BMI 26.79 kg/m      [id number]    Patient A&Ox4, on room air, independent, regular diet tolerated well with a good appetite, afebrile, VSS. Denies pain, nausea, and SOB. Voiding spontaneously, no BM this shift. Last phosphorus replacement given, redraw in the morning. Wife at bedside, she is very supportive and attentive. Continue POC.

## 2025-05-16 NOTE — PROGRESS NOTES
CLINICAL NUTRITION SERVICES - ASSESSMENT NOTE    RECOMMENDATIONS FOR MDs/PROVIDERS TO ORDER:  Continue to manage diarrhea and nausea symptoms while investigating etiology.    Registered Dietitian Interventions:  Weigh pt (no wt this admit)    Encouraged pt to focus on foods he knows he tolerates well and encouraged and reviewed high-protein foods. Discussed some foods that can act as a stool- (PB, cheese, banana, rice) to see if these help make BMs more manageable. Encouraged protein drinks to increase kcal/protein intake as suspect he's not absorbing meal intake as well with frequent BMs and want to prevent further wt loss.    Supplements:   -[employer] Standard 1.0 Chocolate to be offered with breakfast  -Sent one-time order of [employer] Standard 1.0 Chocolate and string cheese for lunch today    Future/Additional Recommendations:  Monitor wt trends, stooling trends, and PO adequacy.     REASON FOR ASSESSMENT  LOS    INFORMATION OBTAINED  Assessed patient in room.    NUTRITION HISTORY  Pt not recently assessed by Clinical Nutrition.    No food allergies noted.    Per visit with pt at bedside: Pt endorses wt loss since Decemeber 2024. Shares he was not trying to lose weight, but did not elaborate on why he thinks he lost weight. Diarrhea is a recent issue since the beginning of this month.    CURRENT NUTRITION ORDERS  Diet: Regular    CURRENT INTAKE/TOLERANCE  Usually % meals since admit per RN flowsheets (couple occasions of 0% and 50% earlier in admit).    Per visit with pt at bedside: Pt reports good appetite. Finding menu items he likes and tolerates such as fruit, yogurt, and the stir penaloza. He does not associate diarrhea or intermittent nausea with any PO or meds, seems more random.     LABS  Nutrition-relevant labs: Reviewed; hypophos, receiving NeutraPhos pkts    MEDICATIONS  Nutrition-relevant medications: Reviewed; Caltate BID, ferrous sulfate, Lasix, medium dose sliding scale  "insulin,Thera-vit-M    ANTHROPOMETRICS  Height:   Ht Readings from Last 1 Encounters:   05/09/25 1.676 m (5' 6\")   Admission Weight:   None  Most Recent Weight:  No wt this admit  IBW: 64.5 kg   BMI: There is no height or weight on file to calculate BMI.   Weight History: ~13% wt loss over the past 6 months  Wt Readings from Last 20 Encounters:   05/09/25 70.8 kg (156 lb)   05/07/25 72.1 kg (159 lb)   05/06/25 73.5 kg (162 lb)   12/16/24 81.6 kg (180 lb)   12/05/24 81.6 kg (180 lb)   09/27/24 83.5 kg (184 lb)   09/17/24 83 kg (183 lb)   08/06/24 82 kg (180 lb 12.8 oz)   07/22/24 82.6 kg (182 lb)   04/16/24 79.8 kg (176 lb)   04/16/24 79.1 kg (174 lb 4.8 oz)   04/01/24 79 kg (174 lb 1.6 oz)   01/16/24 80.3 kg (177 lb)   01/15/24 81.6 kg (180 lb)   01/10/24 81.8 kg (180 lb 4.8 oz)   12/26/23 81.7 kg (180 lb 3.2 oz)   12/11/23 81.6 kg (179 lb 12.8 oz)   11/08/23 78.5 kg (173 lb)   11/07/23 78 kg (172 lb)   10/25/23 74.7 kg (164 lb 9.6 oz)   Dosing Weight: 70 kg, based on actual wt    ASSESSED NUTRITION NEEDS  Estimated Energy Needs: 8060-2645 kcals/day (25 - 30 kcals/kg)  Justification: Maintenance  Estimated Protein Needs:  grams protein/day (1.2 - 1.5 grams of pro/kg)  Justification: Hypercatabolism with acute illness  Estimated Fluid Needs: 1 mL/kcal  Justification: Maintenance and Per provider pending fluid status    SYSTEM AND PHYSICAL FINDINGS    GI symptoms: Diarrhea since 5/2 (persistent from admit) - underwent EGD/colonoscopy with GI on 5/14; biopsies taken. Per Pathology: \"Sections of terminal ileum and random colon show focal neutrophilic cryptitis, mild architectural changes, and rare crypt apoptosis that are nondiagnostic of MMF toxicity. CMV immunohistochemical stains are pending.\"    Skin/wounds: Reviewed, WOCN not following. No skin issues noted in RN flowsheets.    MALNUTRITION  % Intake: Decreased intake does not meet criteria  % Weight Loss: > 10% in 6 months (severe)   Subcutaneous Fat Loss: " Orbital: Mild  Muscle Loss: Clavicles (pectoralis and deltoids): Mild - although, pt feels he still has his strength  Fluid Accumulation/Edema: Moderate to severe, 2-4+  Malnutrition Diagnosis: Moderate malnutrition in the context of chronic illness  Malnutrition Present on Admission: Yes    NUTRITION DIAGNOSIS  Inadequate oral intake related to diarrhea as evidenced by multiple loose BMs daily over past ~2 weeks, and overall significant wt loss since December.    INTERVENTIONS  Medical food supplement therapy  Nutrition education content    GOALS  Patient to consume % of nutritionally adequate meal trays TID, or the equivalent with supplements/snacks.     MONITORING/EVALUATION  Progress toward goals will be monitored and evaluated per policy.    Reina Camp RD, LD  Available on Vocera - can search by name or unit Dietitian  **Clinical Nutrition is no longer available via pager

## 2025-05-16 NOTE — PLAN OF CARE
Blood pressure 115/59, pulse 86, temperature 99  F (37.2  C), temperature source Oral, resp. rate 18, weight 75.3 kg (166 lb), SpO2 99%.  Goal Outcome Evaluation:  Pt.is A&Ox4 ,up independently to bathroom,denied pain and nausea,no BM,voided adequately,regular diet good appetite,LS clear,BS hyperactive,BLE trace of edema. ,magnesium 1.5 replaced by IV,phosphorus 2.1 oral replacement given,potassium 3.7 oral replacement given. Wife at bedside supportive with cares.Plan for possible discharge home later today or tomorrow.

## 2025-05-16 NOTE — PROGRESS NOTES
Care Management Follow Up    Length of Stay (days): 7    Expected Discharge Date: 05/17/2025     Concerns to be Addressed: all concerns addressed in this encounter, no discharge needs identified     Patient plan of care discussed at interdisciplinary rounds: Yes    Anticipated Discharge Disposition:  Home     Anticipated Discharge Services:  None  Anticipated Discharge DME:  None    Patient/family educated on Medicare website which has current facility and service quality ratings:  N/A  Education Provided on the Discharge Plan:  N/A  Patient/Family in Agreement with the Plan:  N/A    Referrals Placed by CM/SW:  None  Private pay costs discussed: Not applicable    Discussed  Partnership in Safe Discharge Planning  document with patient/family: No     Handoff Completed: No, handoff not indicated or clinically appropriate    Additional Information:  Per Provider, expected discharge date unknown as they are waiting for recommendations from pathology and cardiology.     Next Steps: No discharge needs anticipated. Care Management signed off and Provider updated. Please re-consult for any Care Management needs.     Jean Gutierrez RNCC  Covering for 7A  Phone [phone]      RN Care Coordinator     Social Work and Care Management Department      SEARCHABLE in [facility] - search CARE COORDINATOR       [place] [address] [place] ([phone]) Saturday & Sunday; ([phone]) [facility] Recognized Holidays     Units: 5A Onc Vocera & 5C Vocera    Units: 6B Vocera & 6C Vocera    Units: 7A SOT RNCC Vocera, 7B Med Surg Vocera, & 7C Med Surg Vocera    Units: 6A Vocera & 4A CVICU Vocera, 4C MICU Vocera, and 4E SICU Vocera    Units: 5 Ortho Vocera & 5 Med Surg Vocera    Units: 6 Med Surg Vocera & 8 Med Surg Vocera

## 2025-05-16 NOTE — PLAN OF CARE
Goal Outcome Evaluation:      Plan of Care Reviewed With: patient    Overall Patient Progress: improvingOverall Patient Progress: improving           /62 (BP Location: Left arm)   Pulse 89   Temp 98.5  F (36.9  C) (Oral)   Resp 16   SpO2 99%       Shift: 0320-3248  VS: VSS on RA, afebrile  Neuro: Aox4  Behaviors: calm and cooperative  BG: ACHS  Labs: phos, 1.8-->2.6  Respiratory: WDL  Cardiac: WDL  Pain/Nausea: denies  Diet: Regular  IV Access: LPIV  GI/: voiding adequately, 4 loose stools today  Skin: No new deficits,   Mobility: UAL  Plan: continue plan of care

## 2025-05-16 NOTE — PROGRESS NOTES
[facility]    Medicine Progress Note - Hospitalist Service, GOLD TEAM 6    Date of Admission:  5/9/2025    Assessment & Plan   Lucian Oliveira is a 69 year old male with a past medical history of ischemic cardiomyopathy (S/P OHT 2020), HTN, HLD, pHTN, CHANTEL, T2DM, and CKD. He initially presented to the ED for evaluation of diarrhea, and was found to have an DAYA. He was subsequently admitted given diarrhea of unclear etiology c/b DAYA. Remains admitted for further monitoring and management.     Acute Diarrhea, possibly medication-induced (S/P combined EGD/colonoscopy w/ biopsies 5/14/25)  Pt notes onset of diarrhea on 5/2/25 without any associated fevers, abdominal pain, nausea, or vomiting. No known recent sick exposures. Outpatient C diff PCR on 5/9/25 was negative. Presented to the ED where he has since been afebrile, VSS, and not leukocytotic. CT A/P showed fluid-filled colon which is non-specific, but was suggestive of possible enterocolitis. Enteric panel negative on 5/10. CMV, EBV PCRs negative. ID consulted, and no clear infectious source. 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 Cardiology then reduced MMF dose on 5/10, but has not yet had improvement in diarrhea. Has been on Mag Ox since 01/2024, which can contribute to diarrhea, so this was switched to an alternative agent as below. 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 However, he reports a couple loose stools overnight (though the past day RN documentation indicates ~4 loose stools). Still without any abdominal pain, nausea, or vomiting. Tolerating PO intake well.   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 UA negative. Hyaline casts on urine micro and urine Na < 20 suggests hypovolemia. No evidence of hydro on recent CT. Renal function improved w IVF, however diarrhea persists. Repeat Cr back to baseline today; 1.73 today. No uremic symptoms. See below re: AGMA.   - Nephrology signed off   - Discontinued bicarb drip yesterday  - Avoid nephrotoxins  - Will resume Jardiance when taking better PO closer to discharge  - Continue PTA Lisinopril daily, Lasix daily, and Bactrim (Bactrim is dosed qMWF for PJP ppx)  - Repeat BMP in AM     Hyponatremia, resolved  Na 128 on arrival. Urine Na < 20. Likely volume depletion in setting of diarrhea. Na improved to 135 with IV fluids and stable this AM.  - BMP in AM     Hypokalemia, resolved   K 2.6 on arrival. Likely secondary to diarrhea, now complicated by treatment of metabolic acidosis causing intracellular shift. K WNL today.  - Continue RN replacement protocol  - BMP in AM    Hypomagnesemia, resolved  Mag persistently low, in the setting of diarrhea and bowel prep as above for colonoscopy. Repleted w/ Mag Sulfate 5/14, but Mag is low today still (1.5).  - Trend Mag daily for now  - Escalated RN-driven Mag replacement from standard --> high replacement protocol     Anion Gap Metabolic Acidosis, resolved  Possibly multifactorial with GI bicarb loss, uremia, and DAYA/CKD. Resolved with bicarb drip. CO2 31 on BMP today.  - Nephrology consulted, but now signed off  - Sodium bicarb drip stopped 5/13, and bicarb stable  - Trend BMP in AM     Hx ICM s/p OHT (2020)  No acute concerns. 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   - Continue reduced of MMF 750mg BID (50% home dose)  - Sirolimus 3mg daily  - Continue PTA Bactrim (qMWF), Lasix daily, and  Lisinopril daily   - Resume PTA Jardiance when taking better PO closer to discharge   - Per OP notes, was supposed to increase from 10mg daily-->25mg daily but never did. Thus, once able to resume Jardiance, would start at 25mg daily   - Transplant Cardiology following, appreciate recommendations   - Discussed w/ their team today, and, given possible MMF-induced colitis based on colonoscopy pathology, they are switching PTA MMF --> Imuran (see their note for details)     Type II NSTEMI, demand ischemia  Trop mildly elevated but down-trending. Suspect demand ischemia in setting of severe dehydration and DAYA. No chest pain. EKG negative.  - Monitor clinically  - No further trending of Trop necessary at this time unless clinically indicated     Chronic Normocytic Anemia, stable  Hgb at baseline on admission. No evidence of bleeding. Repeat Hgb 8.1 today. Suspect dilutional effect from IVF.  - Monitor clinically  - CBC in AM     Type 2 Diabetes Mellitus  PTA tresiba and jardiance at home.  - Holding PTA Jardiance d/t DAYA (will consider resuming once he is reliably eating more)  - Medium sliding scale insulin TID AC + at bedtime  - BG checks TID AC + at bedtime + 0200  - Hypoglyemia protocol     BPH  - Continue PTA flomax        Diet: Advance Diet as Tolerated: Regular Diet Adult; Regular Diet Adult  Snacks/Supplements Adult: [employer] Standard Oral Supplement; With Meals    DVT Prophylaxis: Pneumatic Compression Devices  Calderon Catheter: Not present  Lines: None     Cardiac Monitoring: None  Code Status: Full Code      Clinically Significant Risk Factors             # Hypomagnesemia: Lowest Mg = 1.5 mg/dL in last 2 days, will replace as needed       # Hypertension: Noted on problem list    # Chronic heart failure with preserved ejection fraction: heart failure noted on problem list and last echo with EF >50%          # DMII: A1C = 7.1 % (Ref range: <=5.7 %) within past 6 months   # Overweight: Estimated body mass index  "is 26.79 kg/m  as calculated from the following:    Height as of an earlier encounter on 5/9/25: 1.676 m (5' 6\").    Weight as of this encounter: 75.3 kg (166 lb).   # Moderate Malnutrition: based on nutrition assessment and treatment provided per dietitian's recommendations.    # Financial/Environmental Concerns: none         Social Drivers of Health    Physical Activity: Inactive (12/16/2024)    Exercise Vital Sign     Days of Exercise per Week: 0 days     Minutes of Exercise per Session: 0 min   Stress: Stress Concern Present (12/16/2024)    Citizen of [place] [employer] - Occupational Stress Questionnaire     Feeling of Stress : Rather much   Social Connections: Unknown (12/16/2024)    Social Connection and Isolation Panel [NHANES]     Frequency of Social Gatherings with Friends and Family: More than three times a week          Disposition Plan     Medically Ready for Discharge: Anticipated in 2-4 Days       The patient's care was discussed with the Attending Physician, Dr. Lily Bucio, Bedside Nurse, Patient, and GI, Cardiology, ID Consultant(s).    Sang Waldron PA-C  Hospitalist Service, [address]  Securely message with [employer] (more info)  Text page via [facility] Paging/Directory   See signed in provider for up to date coverage information  ______________________________________________________________________    Interval History   Pt seen in his room this AM. Patient inquires about the results of the biopsies. Reports an episode of diarrhea before bedtime last night, then overnight. However, none this AM. He otherwise denies abdominal pain, nausea, vomiting, urinary changes, chest pain, dyspnea, lightheadedness, BLE pain or swelling.    Physical Exam   Vital Signs: Temp: 99  F (37.2  C) Temp src: Oral BP: 115/59 Pulse: 86   Resp: 18 SpO2: 99 % O2 Device: None (Room air)    Weight: 166 lbs 0 oz    Constitutional: awake, alert, cooperative, no " apparent distress, and appears stated age. Lying supine in bed.   Eyes: lids and lashes normal, sclera clear, and conjunctiva normal  ENT: normocephalic, without obvious abnormality  Respiratory: No increased work of breathing, good air exchange, clear to auscultation bilaterally, no crackles or wheezing  Cardiovascular: regular rate and rhythm, normal S1 and S2, no S3, no S4, and no murmur noted  GI: normal bowel sounds, soft, non-distended, and non-tender  Skin: no bruising or bleeding, no redness, warmth, or swelling, no rashes, and no lesions on visualized skin.   Musculoskeletal: no lower extremity pitting edema present, no deformities, no calf tenderness.  Neurologic: Moving all extremities equally and spontaneously. No obvious focal neuro deficits.  Neuropsychiatric: General: normal, calm, and normal eye contact  Level of consciousness: alert / normal  Affect: normal and pleasant  Memory and insight: normal, memory for past and recent events intact, and thought process normal    Medical Decision Making       80 MINUTES SPENT BY ME on the date of service doing chart review, history, exam, documentation & further activities per the note.      Data     I have personally reviewed the following data over the past 24 hrs:    7.3  \   8.1 (L)   / 192     139 104 35.9 (H) /  214 (H)   3.7; 3.7 22 1.73 (H) \       Imaging results reviewed over the past 24 hrs:   No results found for this or any previous visit (from the past 24 hours).  Recent Labs   Lab 05/16/25  1249 05/16/25  0849 05/16/25  0623 05/15/25  0739 05/15/25  0556 05/14/25  1620 05/14/25  1416 05/14/25  0408 05/14/25  0355 05/13/25  0601 05/13/25  0559   WBC  --   --  7.3  --  6.2  --   --   --  7.4   < >  --    HGB  --   --  8.1*  --  7.8*  --   --   --  8.6*   < >  --    MCV  --   --  87  --  87  --   --   --  85   < >  --    PLT  --   --  192  --  180  --   --   --  201   < >  --    INR  --   --   --   --   --   --   --   --  1.13  --   --    NA  --    --  139  --  142  --   --   --  139  --  141   POTASSIUM  --   --  3.7  3.7  --  4.0  --  4.1   < > 3.3*  3.3*   < > 3.0*   CHLORIDE  --   --  104  --  104  --   --   --  97*  --  99   CO2  --   --  22  --  29  --   --   --  31*  --  29   BUN  --   --  35.9*  --  38.0*  --   --   --  63.0*  --  85.3*   CR  --   --  1.73*  --  1.60*  --   --   --  1.90*  --  2.45*   ANIONGAP  --   --  13  --  9  --   --   --  11  --  13   BRYANNA  --   --  8.6*  --  8.7*  --   --   --  9.0  --  8.7*   * 213* 166*   < > 109*   < >  --    < > 121*   < > 156*   ALBUMIN  --   --   --   --   --   --   --   --  3.9  --  3.8   PROTTOTAL  --   --   --   --   --   --   --   --  6.3*  --  6.1*   BILITOTAL  --   --   --   --   --   --   --   --  0.3  --  0.2   ALKPHOS  --   --   --   --   --   --   --   --  116  --  114   ALT  --   --   --   --   --   --   --   --  10  --  9   AST  --   --   --   --   --   --   --   --  22  --  17    < > = values in this interval not displayed.

## 2025-05-16 NOTE — PROGRESS NOTES
I spoke with Dr. Kwok from pathology, who is on-call for path and reviewed this case that was signed out to him by Dr. Sawyer.   5/14 colonoscopy path - CMV stains negative. Reviewed rest of path report and the presence of neutrophils and crypt apoptosis can occur with multiple etiologies including medications and infection. So far the infectious evaluation has been negative including CMV, EBV, C diff, O&P, Giardia, Microspordia, Cryptosporidium, Norovirus, Saprovirus. Afebrile, no leukocytosis. I have been in contact with cardiology and primary team. Transplant cardiology will transition from Mycophenolate to azathioprine due to the concern for MMF induced colitis. Transplant ID will sign off. Feel free to contact me if there are concerns as an outpatient.     Alicia Adkins D.O.   Infectious Diseases  Contact information available via [facility] Paging/Directory

## 2025-05-16 NOTE — PLAN OF CARE
Goal Outcome Evaluation:      Plan of Care Reviewed With: patient    Overall Patient Progress: no changeOverall Patient Progress: no change  Status: Admitted on 5/9 for diarrhea and vomiting.   Vitals: VSS on RA    Neuros: A & O x4. Able to make needs known.   IV: PIV is saline locked.   Resp/trach: RA WNL   Diet: Regular diet. Tolerate well.   Bowel status: No BM during shift. Last BM was 05/15/2025. Passing gas.   : Void spontaneously without complications   Skin: Intact, no new deficits.   Pain: Denies pain.   Activity: UAL   Plan:  Continue with plan of care and monitor for any changes. Possible discharge today.

## 2025-05-16 NOTE — PROGRESS NOTES
Physical Therapy Discharge Summary    Reason for therapy discharge:    Patient/family request discontinuation of services.    Progress towards therapy goal(s). See goals on Care Plan in [facility] electronic health record for goal details.  Pt last seen 5/11 by PT. PT connected with RN and patient. Pt reported no ongoing concerns with ambulation, strength or dizziness. He has been up ad fidelia in room and halls without concern. Pt was in agreement with d/c from PT at this time. RN notified.    Therapy recommendation(s):    No further therapy is recommended.

## 2025-05-17 VITALS
DIASTOLIC BLOOD PRESSURE: 63 MMHG | TEMPERATURE: 98.3 F | BODY MASS INDEX: 26.89 KG/M2 | SYSTOLIC BLOOD PRESSURE: 121 MMHG | WEIGHT: 166.6 LBS | HEART RATE: 86 BPM | OXYGEN SATURATION: 100 % | RESPIRATION RATE: 16 BRPM

## 2025-05-17 LAB
ANION GAP SERPL CALCULATED.3IONS-SCNC: 10 MMOL/L (ref 7–15)
BASOPHILS # BLD AUTO: 0 10E3/UL (ref 0–0.2)
BASOPHILS NFR BLD AUTO: 0 %
BUN SERPL-MCNC: 29.5 MG/DL (ref 8–23)
CALCIUM SERPL-MCNC: 7.9 MG/DL (ref 8.8–10.4)
CHLORIDE SERPL-SCNC: 105 MMOL/L (ref 98–107)
CREAT SERPL-MCNC: 1.63 MG/DL (ref 0.67–1.17)
EGFRCR SERPLBLD CKD-EPI 2021: 45 ML/MIN/1.73M2
EOSINOPHIL # BLD AUTO: 0.1 10E3/UL (ref 0–0.7)
EOSINOPHIL NFR BLD AUTO: 2 %
ERYTHROCYTE [DISTWIDTH] IN BLOOD BY AUTOMATED COUNT: 15.6 % (ref 10–15)
GLUCOSE BLDC GLUCOMTR-MCNC: 177 MG/DL (ref 70–99)
GLUCOSE SERPL-MCNC: 188 MG/DL (ref 70–99)
HCO3 SERPL-SCNC: 24 MMOL/L (ref 22–29)
HCT VFR BLD AUTO: 24.8 % (ref 40–53)
HGB BLD-MCNC: 7.8 G/DL (ref 13.3–17.7)
IMM GRANULOCYTES # BLD: 0.1 10E3/UL
IMM GRANULOCYTES NFR BLD: 1 %
LYMPHOCYTES # BLD AUTO: 1.3 10E3/UL (ref 0.8–5.3)
LYMPHOCYTES NFR BLD AUTO: 21 %
MAGNESIUM SERPL-MCNC: 1.8 MG/DL (ref 1.7–2.3)
MCH RBC QN AUTO: 27.1 PG (ref 26.5–33)
MCHC RBC AUTO-ENTMCNC: 31.5 G/DL (ref 31.5–36.5)
MCV RBC AUTO: 86 FL (ref 78–100)
MONOCYTES # BLD AUTO: 0.4 10E3/UL (ref 0–1.3)
MONOCYTES NFR BLD AUTO: 7 %
NEUTROPHILS # BLD AUTO: 4 10E3/UL (ref 1.6–8.3)
NEUTROPHILS NFR BLD AUTO: 68 %
NRBC # BLD AUTO: 0 10E3/UL
NRBC BLD AUTO-RTO: 0 /100
PHOSPHATE SERPL-MCNC: 2 MG/DL (ref 2.5–4.5)
PLATELET # BLD AUTO: 157 10E3/UL (ref 150–450)
POTASSIUM SERPL-SCNC: 3.9 MMOL/L (ref 3.4–5.3)
RBC # BLD AUTO: 2.88 10E6/UL (ref 4.4–5.9)
SODIUM SERPL-SCNC: 139 MMOL/L (ref 135–145)
WBC # BLD AUTO: 5.9 10E3/UL (ref 4–11)

## 2025-05-17 PROCEDURE — 85025 COMPLETE CBC W/AUTO DIFF WBC: CPT

## 2025-05-17 PROCEDURE — 82947 ASSAY GLUCOSE BLOOD QUANT: CPT

## 2025-05-17 PROCEDURE — 250N000012 HC RX MED GY IP 250 OP 636 PS 637: Performed by: STUDENT IN AN ORGANIZED HEALTH CARE EDUCATION/TRAINING PROGRAM

## 2025-05-17 PROCEDURE — 99239 HOSP IP/OBS DSCHRG MGMT >30: CPT

## 2025-05-17 PROCEDURE — 250N000013 HC RX MED GY IP 250 OP 250 PS 637: Performed by: PHYSICIAN ASSISTANT

## 2025-05-17 PROCEDURE — 250N000012 HC RX MED GY IP 250 OP 636 PS 637: Performed by: PHYSICIAN ASSISTANT

## 2025-05-17 PROCEDURE — 250N000013 HC RX MED GY IP 250 OP 250 PS 637

## 2025-05-17 PROCEDURE — 83735 ASSAY OF MAGNESIUM: CPT

## 2025-05-17 PROCEDURE — 250N000013 HC RX MED GY IP 250 OP 250 PS 637: Performed by: STUDENT IN AN ORGANIZED HEALTH CARE EDUCATION/TRAINING PROGRAM

## 2025-05-17 PROCEDURE — 83540 ASSAY OF IRON: CPT | Performed by: PHYSICIAN ASSISTANT

## 2025-05-17 PROCEDURE — 84100 ASSAY OF PHOSPHORUS: CPT

## 2025-05-17 PROCEDURE — 36415 COLL VENOUS BLD VENIPUNCTURE: CPT

## 2025-05-17 PROCEDURE — 82728 ASSAY OF FERRITIN: CPT | Performed by: PHYSICIAN ASSISTANT

## 2025-05-17 RX ORDER — AZATHIOPRINE 50 MG/1
50 TABLET ORAL DAILY
Qty: 45 TABLET | Refills: 0 | Status: ACTIVE | OUTPATIENT
Start: 2025-05-18

## 2025-05-17 RX ORDER — PANTOPRAZOLE SODIUM 40 MG/1
40 TABLET, DELAYED RELEASE ORAL DAILY
Qty: 30 TABLET | Refills: 0 | Status: SHIPPED | OUTPATIENT
Start: 2025-05-18

## 2025-05-17 RX ORDER — CALCIUM CARBONATE 500 MG/1
2 TABLET, CHEWABLE ORAL 4 TIMES DAILY PRN
Qty: 60 TABLET | Refills: 0 | Status: SHIPPED | OUTPATIENT
Start: 2025-05-17

## 2025-05-17 RX ADMIN — Medication 1 TABLET: at 07:54

## 2025-05-17 RX ADMIN — INSULIN ASPART 1 UNITS: 100 INJECTION, SOLUTION INTRAVENOUS; SUBCUTANEOUS at 07:55

## 2025-05-17 RX ADMIN — SIROLIMUS 3 MG: 2 TABLET ORAL at 07:54

## 2025-05-17 RX ADMIN — CALCIUM CARBONATE 600 MG (1,500 MG)-VITAMIN D3 400 UNIT TABLET 1 TABLET: at 07:54

## 2025-05-17 RX ADMIN — FUROSEMIDE 20 MG: 20 TABLET ORAL at 07:54

## 2025-05-17 RX ADMIN — PANTOPRAZOLE SODIUM 40 MG: 40 TABLET, DELAYED RELEASE ORAL at 07:54

## 2025-05-17 RX ADMIN — LISINOPRIL 5 MG: 5 TABLET ORAL at 07:54

## 2025-05-17 RX ADMIN — AZATHIOPRINE 50 MG: 50 TABLET ORAL at 07:54

## 2025-05-17 RX ADMIN — MAGNESIUM 64 MG (MAGNESIUM CHLORIDE) TABLET,DELAYED RELEASE 535 MG: at 07:53

## 2025-05-17 RX ADMIN — ASPIRIN 81 MG CHEWABLE TABLET 81 MG: 81 TABLET CHEWABLE at 07:53

## 2025-05-17 RX ADMIN — ROSUVASTATIN CALCIUM 10 MG: 10 TABLET, FILM COATED ORAL at 07:54

## 2025-05-17 RX ADMIN — FERROUS SULFATE TAB 325 MG (65 MG ELEMENTAL FE) 325 MG: 325 (65 FE) TAB at 07:54

## 2025-05-17 RX ADMIN — TAMSULOSIN HYDROCHLORIDE 0.4 MG: 0.4 CAPSULE ORAL at 07:54

## 2025-05-17 ASSESSMENT — ACTIVITIES OF DAILY LIVING (ADL)
ADLS_ACUITY_SCORE: 43
ADLS_ACUITY_SCORE: 43
ADLS_ACUITY_SCORE: 44
ADLS_ACUITY_SCORE: 43
ADLS_ACUITY_SCORE: 44
ADLS_ACUITY_SCORE: 43
ADLS_ACUITY_SCORE: 44
ADLS_ACUITY_SCORE: 43

## 2025-05-17 NOTE — PLAN OF CARE
Goal Outcome Evaluation:    Plan of Care Reviewed With: patient  Outcome Evaluation: VSS on RA, bg 184 at 0200, denies pain/nausea.    [id number]  /62 (BP Location: Left arm)   Pulse 78   Temp 98.2  F (36.8  C) (Oral)   Resp 16   Wt 75.6 kg (166 lb 9.6 oz)   SpO2 97%   BMI 26.89 kg/m       VS- Stable on RA.   BG- ACHS 184  Labs- K+ mag + phos rechecks with AM labs.  Pain/Nausea/PRN'S- denies nausea/pain   Diet- Regular   LDA- PIV X1 SL  Gtt/IVF- none   GI/- voiding, still having loose stools but reports going less tonight   Skin- intact  Activity- UAL  Plan- possible discharge today, continue with POC.

## 2025-05-17 NOTE — PLAN OF CARE
"Goal Outcome Evaluation:           Overall Patient Progress: improvingOverall Patient Progress: improving    Outcome Evaluation: VSS, on RA. Denies pain/nausea. Loperamide changed to PRN.        /81 (BP Location: Left arm)   Pulse 82   Temp 98.7  F (37.1  C) (Oral)   Resp 16   Wt 75.3 kg (166 lb)   SpO2 96%   BMI 26.79 kg/m      Shift: 9732-1752  Isolation Status: N/A  VS: VSS on RA, afebrile  Neuro: Aox4, makes needs known. German-speaking, knows some English.  Behaviors: Calm, cooperative, pleasant  BG: ACHS + 0200 - 222  Labs/Imaging: K 3.7 (replaced this AM), BUN 35.9, Cr 1.73, Ca 8.6, Mag 1.5 (replaced this AM), Phos 2.1 (replaced this AM), Hgb 8.1, hematocrit 26.2  Respiratory: WDL room air  Cardiac: WDL  Pain/Nausea: Denies  PRN: N/A  Diet: Regular, good appetite  LDA: L PIV SL  Infusion(s): N/A  GI/: Voiding spontaneously w/o difficulty. \"Many\" loose BMs today per pt report.  Skin: WDL no new concerns  Mobility: UAL  Events/Education: Pt's wife & daughter @ bedside,   Plan: Continue w/plan of care & notify team w/any changes. Possible discharge tomorrow AM pending electrolytes.  "

## 2025-05-17 NOTE — PLAN OF CARE
Goal Outcome Evaluation:    /62 (BP Location: Left arm)   Pulse 78   Temp 98.2  F (36.8  C) (Oral)   Resp 16   Wt 75.6 kg (166 lb 9.6 oz)   SpO2 97%   BMI 26.89 kg/m      DISCHARGE:  Patient with orders to discharge to home.       Discharge instructions, medications & follow ups reviewed with patient. Copy of discharge summary given to patient. PIV removed.     Patient in stable condition. AVSS. Patient had no further questions regarding discharge instructions and medications. Patient transferred out by wheelchair & left with wife.  Plan for outpatient follow up.        Plan of Care Reviewed With: patient    Overall Patient Progress: improvingOverall Patient Progress: improving    Outcome Evaluation: No loose stools since yesterday. Passing gas and voiding adequatly.

## 2025-05-17 NOTE — PROGRESS NOTES
Brief cardiology progress note.    Based on discussions with ID, no obvious infectious cause of colitis identified.  Based on discussion with pathology with concern for MMF colitis, Patient was transition to azathioprine 50 mg daily.    Patient can discharge from cardiac perspective.    Michael Dillard MD  Cardiology fellow (PGY5)  [address]    Discussed with Dr. Hogan

## 2025-05-17 NOTE — DISCHARGE SUMMARY
"[facility]  Hospitalist Discharge Summary      Date of Admission:  5/9/2025  Date of Discharge:  5/17/2025 12:11 PM  Discharging Provider: Sang Waldron PA-C, Dr. Lily Bucio  Discharge Service: Hospitalist Service, GOLD TEAM 6    Discharge Diagnoses   Acute Diarrhea likely MMF-induced, resolved  DAYA, suspect prerenal, resolved  CKD Stage 3b  Hyponatremia, resolved  Hypokalemia, resolved   Hypomagnesemia, resolved  Hypophosphatemia  Anion Gap Metabolic Acidosis, resolved  Hx ICM s/p OHT (2020)  Type II NSTEMI, demand ischemia  Chronic Normocytic Anemia, stable  Type 2 Diabetes Mellitus  BPH    Clinically Significant Risk Factors     # DMII: A1C = 7.1 % (Ref range: <=5.7 %) within past 6 months  # Overweight: Estimated body mass index is 26.89 kg/m  as calculated from the following:    Height as of an earlier encounter on 5/9/25: 1.676 m (5' 6\").    Weight as of this encounter: 75.6 kg (166 lb 9.6 oz).  # Moderate Malnutrition: based on nutrition assessment and treatment provided per dietitian's recommendations.      Follow-ups Needed After Discharge   Follow-up Appointments       Hospital Follow-up with Existing Primary Care Provider (PCP)          Schedule Primary Care visit within: 30 Days               Discharge Disposition   Discharged to home  Condition at discharge: Stable    Hospital Course   Lucian Oliveira is a 69 year old male with a past medical history of ischemic cardiomyopathy (S/P OHT 2020), HTN, HLD, pHTN, CHANTEL, T2DM, and CKD. He initially presented to the ED for evaluation of diarrhea, and was found to have an DAYA. He was subsequently admitted given diarrhea of unclear etiology c/b DAYA. He was admitted for further monitoring and management, but then discharged in stable condition to home after no further episodes of diarrhea on 5/17/25.    Acute Diarrhea, likely MMF induced (S/P combined EGD/colonoscopy w/ biopsies 5/14/25)  Pt notes onset of " diarrhea on 5/2/25 without any associated fevers, abdominal pain, nausea, or vomiting. No known recent sick exposures. Outpatient C diff PCR on 5/9/25 was negative. Presented to the ED where he has since been afebrile, VSS, and not leukocytotic. CT A/P showed fluid-filled colon which is non-specific, but was suggestive of possible enterocolitis. Enteric panel negative on 5/10. CMV, EBV PCRs negative. ID consulted, and no clear infectious source. 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 Cardiology then reduced MMF dose on 5/10, but has not yet had improvement in diarrhea. Has been on Mag Ox since 01/2024, which can contribute to diarrhea, so this was switched to an alternative agent as below. He underwent combined EGD/colonoscopy on 5/14/25 w/ biopsies c/w possible MMF-induced colitis, so this medication was discontinued and he was switched to Azathioprine 50mg daily on 5/16/25. CMV stain negative. On the day of discharge, he had reported no further episodes of diarrhea for ~24 hours prior to this.  - Stop MMF at discharge  - Continue Azathioprine at discharge   - Discussed and provided him w/ [id number] precautions  - Can continue Imodium at discharge PRN  - Continue Mag Chloride (switched from Mag Ox)     DAYA, suspect prerenal, resolved  CKD Stage 3b  Cr 4.88 on arrival (baseline 1.8-2.2). UA negative. Hyaline casts on urine micro and urine Na < 20 suggests hypovolemia. No evidence of hydro on recent CT. Renal function improved w IVF, however diarrhea persists. Repeat Cr back to baseline at discharge. No uremic symptoms. See below re: AGMA.   - Resume Jardiance 25mg daily at discharge as now eating/drinking better  - Continue PTA Lisinopril daily, Lasix daily, and Bactrim (Bactrim is dosed qMWF for PJP ppx)     Hyponatremia, resolved  Na 128 on arrival. Urine Na < 20. Likely volume depletion in setting of diarrhea. Na improved to 135 with IV fluids and stable this  AM.  - Follow-up w/ PCP     Hypokalemia, resolved   K 2.6 on arrival. Likely secondary to diarrhea, now complicated by treatment of metabolic acidosis causing intracellular shift. K WNL at discharge.  - Follow-up w/ PCP    Hypomagnesemia, resolved  Mag persistently low, in the setting of diarrhea and bowel prep as above for colonoscopy. Repleted w/ Mag Sulfate 5/14, and Mag WNL at discharge.  - While admitted, switched from Mag Ox to Mag Chloride given loose stools, and will continue at discharge    Hypophosphatemia   Phos low throughout admission, suspect r/t needing to remain NPO for surgery + bowel prep for colonoscopy. Low on the day of discharge (2.0).  - Will provide Neutra-Phos at discharge, to take 2 packets daily  - Follow-up w/ PCP within 1-2 weeks at discharge for repeat BMP + Phos     Anion Gap Metabolic Acidosis, resolved  Possibly multifactorial with GI bicarb loss, uremia, and DAYA/CKD. Resolved with bicarb drip, and CO2 remained stable following discontinuation of bicarb drip.  - Follow-up w/ PCP      Hx ICM s/p OHT (2020)  No acute concerns. Suspect MMF-induced colitis causing diarrhea, so MMF stopped here and switched to Azathioprine.   - At discharge, MMF stopped and continue Azathioprine 50mg daily   - Sirolimus 3mg daily  - Continue PTA Bactrim (qMWF), Lasix daily, and Lisinopril daily   - Resumed PTA Jardiance at increased dose of 25mg daily (per prior documentation was supposed to be on 25mg daily but was only taking 10mg daily d/t confusion w/ dosing)  - Follow-up w/ Cardiology as an OP     Type II NSTEMI, demand ischemia  Trop mildly elevated but down-trending. Suspect demand ischemia in setting of severe dehydration and DAYA. No chest pain. EKG negative.  - Follow-up w/ Cardiology as an OP     Chronic Normocytic Anemia, stable  Hgb at baseline on admission. No evidence of bleeding. Repeat Hgb 7.8 at discharge. Suspect dilutional effect from IVF.  - Follow-up w/ PCP     Type 2 Diabetes  Mellitus  PTA tresiba and jardiance at home.  - At discharge, resume PTA Jardiance at 25mg daily (increased from 10mg PTA, see above)  - Continue PTA Tresiba     BPH  - Continue PTA flomax    Consultations This Hospital Stay   HEART TRANSPLANT ADULT IP CONSULT  NEPHROLOGY GENERAL ADULT IP CONSULT  PHYSICAL THERAPY ADULT IP CONSULT  OCCUPATIONAL THERAPY ADULT IP CONSULT  INFECTIOUS DISEASE TRANSPLANT SOT ADULT IP CONSULT  CARE MANAGEMENT / SOCIAL WORK IP CONSULT  GI LUMINAL ADULT IP CONSULT  NURSING TO CONSULT FOR VASCULAR ACCESS CARE IP CONSULT  NURSING TO CONSULT FOR VASCULAR ACCESS CARE IP CONSULT    Code Status   Full Code    Time Spent on this Encounter   Sang FONTAINE PA-C, personally saw the patient today and spent greater than 30 minutes discharging this patient.       Sang Waldron PA-C  [facility] [address] [facility]  [address] [place] [id number]  Phone: [phone]  ______________________________________________________________________    Physical Exam   Vital Signs: Temp: 98.3  F (36.8  C) Temp src: Oral BP: 121/63 Pulse: 86   Resp: 16 SpO2: 100 % O2 Device: None (Room air)    Weight: 166 lbs 9.6 oz  Constitutional: awake, alert, cooperative, no apparent distress, and appears stated age. Lying supine in bed.   Eyes: lids and lashes normal, sclera clear, and conjunctiva normal  ENT: normocephalic, without obvious abnormality  Respiratory: No increased work of breathing, good air exchange, clear to auscultation bilaterally, no crackles or wheezing  Cardiovascular: regular rate and rhythm, normal S1 and S2, no S3, no S4, and no murmur noted  GI: normal bowel sounds, soft, non-distended, and non-tender  Skin: no bruising or bleeding, no redness, warmth, or swelling, no rashes, and no lesions on visualized skin.   Musculoskeletal: no lower extremity pitting edema present, no deformities, no calf tenderness.  Neurologic: Moving all extremities equally and spontaneously. No  obvious focal neuro deficits.  Neuropsychiatric: General: normal, calm, and normal eye contact  Level of consciousness: alert / normal  Affect: normal and pleasant  Memory and insight: normal, memory for past and recent events intact, and thought process normal       Primary Care Physician   Fracisco Casiano    Discharge Orders      Primary Care - Care Coordination Referral      Reason for your hospital stay    You were hospitalized for diarrhea. You had an EGD/Colonoscopy (camera into your stomach and camera into your colon) to evaluate the cause. Biopsies showed possible mycophenolate-induced colitis as the cause of your diarrhea, so your mycophenolate was switched to Azathioprine (Imuran), another type of immunosuppression medication. You ultimately improved enough to discharge home in stable condition.     Activity    Your activity upon discharge: activity as tolerated     Discharge Instructions    Call 911 or go to the ER immediately if you develop the following symptoms: chest pain, shortness of breath, worsening diarrhea, blood or black in stool, fevers, abdominal pain, dizziness.     Diet    Follow this diet upon discharge: Current Diet:Orders Placed This Encounter      Snacks/Supplements Adult: Beryl Shaw Standard Oral Supplement; With Meals      Advance Diet as Tolerated: Regular Diet Adult; Regular Diet Adult     Hospital Follow-up with Existing Primary Care Provider (PCP)            Significant Results and Procedures   Most Recent 3 CBC's:  Recent Labs   Lab Test 05/17/25  0549 05/16/25  0623 05/15/25  0556   WBC 5.9 7.3 6.2   HGB 7.8* 8.1* 7.8*   MCV 86 87 87    192 180     Most Recent 3 BMP's:  Recent Labs   Lab Test 05/17/25  0746 05/17/25  0549 05/16/25  1618 05/16/25  0849 05/16/25  0623 05/15/25  0739 05/15/25  0556   NA  --  139  --   --  139  --  142   POTASSIUM  --  3.9  --   --  3.7  3.7  --  4.0   CHLORIDE  --  105  --   --  104  --  104   CO2  --  24  --   --  22  --  29   BUN  --   29.5*  --   --  35.9*  --  38.0*   CR  --  1.63*  --   --  1.73*  --  1.60*   ANIONGAP  --  10  --   --  13  --  9   BRYANNA  --  7.9*  --   --  8.6*  --  8.7*   * 188* 216*   < > 166*   < > 109*    < > = values in this interval not displayed.     Most Recent 2 LFT's:  Recent Labs   Lab Test 05/14/25  0355 05/13/25  0559   AST 22 17   ALT 10 9   ALKPHOS 116 114   BILITOTAL 0.3 0.2   ,   Results for orders placed or performed during the hospital encounter of 05/09/25   CT Abdomen Pelvis w/o Contrast    Narrative    EXAMINATION: CT ABDOMEN PELVIS W/O CONTRAST, 5/9/2025 4:43 PM    INDICATION: Abd pain, weakness.  GFR < 30    COMPARISON STUDY: 10/11/2023, 7/9/2019    TECHNIQUE: CT scan of the abdomen and pelvis was performed on  multidetector CT scanner using volumetric acquisition technique and  images were reconstructed in multiple planes with variable thickness  and reviewed on dedicated workstations.     CONTRAST: None injected IV without oral contrast    CT scan radiation dose is optimized to minimum requisite dose using  automated dose modulation techniques.    FINDINGS:    Lower thorax: Small bilateral calcified granulomas.    Liver: No mass. No intrahepatic biliary ductal dilation.    Biliary System: Cholelithiasis without cholecystitis.    Pancreas: No mass or pancreatic ductal dilation.    Adrenal glands: No mass or nodules    Spleen: Normal.    Kidneys: No suspicious mass, obstructing calculus or hydronephrosis.    Gastrointestinal tract: Normal appendix. Normal caliber small bowel.   Numerous duodenal and jejunal diverticula without inflammation.    Mesentery/peritoneum/retroperitoneum: No mass. No free fluid or air.    Lymph nodes: No significant lymphadenopathy.    Pelvis: Urinary bladder is normal.    Osseous structures: No aggressive or acute osseous lesion.  In the  proximal right femur (3/65) there is a benign-appearing osseous lesion  which is unchanged since 7/19/2019 and is not likely to be  clinically  significant.      Soft tissues: Within normal limits.  Bilateral chronic mastoid.      Impression    IMPRESSION:   1. Fluid-filled colon which is nonspecific but can be seen with  enterocolitis. No additional acute findings in the abdomen or pelvis.  Normal appendix.  2. Cholelithiasis without evidence of cholecystitis.  3. Duodenal and jejunal diverticula without evidence of  diverticulitis.    I have personally reviewed the examination and initial interpretation  and I agree with the findings.    NIKOLE DAVILA MD         SYSTEM ID:  [id number]     *Note: Due to a large number of results and/or encounters for the requested time period, some results have not been displayed. A complete set of results can be found in Results Review.       Discharge Medications   Discharge Medication List as of 5/17/2025 11:56 AM        START taking these medications    Details   azaTHIOprine (IMURAN) 50 MG tablet Take 1 tablet (50 mg) by mouth daily., Disp-45 tablet, R-0, E-Prescribe      calcium carbonate (TUMS) 500 MG chewable tablet Take 2 tablets (1,000 mg) by mouth 4 times daily as needed for heartburn., Disp-60 tablet, R-0, E-Prescribe      magnesium chloride 535 (64 Mg) MG TBEC CR tablet Take 1 tablet (535 mg) by mouth daily., Disp-30 tablet, R-0, E-Prescribe      pantoprazole (PROTONIX) 40 MG EC tablet Take 1 tablet (40 mg) by mouth daily., Disp-30 tablet, R-0, E-Prescribe      potassium & sodium phosphates (NEUTRA-PHOS) 280-160-250 MG Packet Take 2 packets by mouth daily., Disp-100 packet, R-0, E-Prescribe           CONTINUE these medications which have CHANGED    Details   empagliflozin (JARDIANCE) 25 MG TABS tablet Take 1 tablet (25 mg) by mouth daily. Solamente empiece a fabiola kerry medicamento al regresar al dieta halie lo normal. Ademas, tome kerry medicamento en el dosis de 25mg diariamente, y usted puede dejar de fabiola el dosis 10mg., Disp-30 tablet, R-0, E-Presc ribeInstructions in pt sig provided in  Belarusian (only take once returning to normal diet and to switch from 10-->25mg daily).           CONTINUE these medications which have NOT CHANGED    Details   acetaminophen (TYLENOL) 325 MG tablet Take 3 tablets (975 mg) by mouth every 8 hours as needed for mild pain, Disp-60 tablet,R-3, E-Prescribe      Alcohol Swabs PADS Use to swab the area of the injection or sergio as directed   Per insurance coverage, Disp-100 each,R-0, Local Print      aspirin (ASA) 81 MG chewable tablet Take 1 tablet (81 mg) by mouth daily, Disp-120 tablet, R-0, E-Prescribe      bisacodyl (DULCOLAX) 5 MG EC tablet Two days prior to exam take two (2) tablets at 4pm. One day prior to exam take two (2) tablets at 4pm, Disp-4 tablet, R-0, E-Prescribe      blood glucose (NO BRAND SPECIFIED) test strip Use to test blood sugar 4 times daily or as directed., Disp-400 strip, R-3, E-Prescribe      blood glucose monitoring (ACCU-CHEK FELIPE SMARTVIEW) meter device kit Use to test blood sugar 3-4 times daily, as directed.Disp-1 kit,[id number]      blood glucose monitoring (SOFTCLIX) lancets 1 each 4 times daily Use to test blood sugars 2 times daily., Disp-400 each, R-8, E-Prescribe      calcium carbonate-vitamin D (CALTRATE) 600-10 MG-MCG per tablet TAKE ONE TABLET BY MOUTH TWICE A DAY WITH MEALS, Disp-180 tablet, R-3, E-Prescribe      Continuous Glucose Sensor (DEXCOM G7 SENSOR) MISC CAMBIE CADA 10 MCINTYRE, Disp-10 each, R-2, E-PrescribePlease send a replace/new response with 100-Day Supply if appropriate to maximize member benefit. Requesting 1 year supply.      ferrous sulfate (FEROSUL) 325 (65 Fe) MG tablet Take 1 tablet (325 mg) by mouth 2 times daily (with meals), Disp-180 tablet, R-3, E-Prescribe      furosemide (LASIX) 20 MG tablet Take 1 tablet (20 mg) by mouth daily., Disp-90 tablet, R-3, E-Prescribe90-day fill if able      HUMALOG KWIKPEN 100 UNIT/ML soln Give 10 units with breakfast, 10 units with lunch,  and 5 units with dinner.  Average  daily use is 25 units, Disp-90 mL, R-3, DACRI, E-Prescribe      Injection Device for insulin (CEQUR SIMPLICITY 2U) KALI 1 each See Admin Instructions. Change patch every 2-3 days, Disp-40 each, R-4, E-Prescribe      Injection Device for Insulin (CEQUR SIMPLICITY ) MISC 1 each See Admin Instructions. Use with patches. Change patch every 2-3 days, Disp-1 each, R-1, E-Prescribe      insulin degludec (TRESIBA) 200 UNIT/ML pen Inject 60 Units Subcutaneous every morning Order pen needles too, Disp-90 mL, R-11, E-Prescribe      insulin pen needle (32G X 4 MM) 32G X 4 MM miscellaneous Use 5-6 pen needles daily or as directed.Disp-600 each, I-[id number]-Exortxoau-   Ref: [id number]      ketorolac (ACULAR) 0.5 % ophthalmic solution Place 1 drop into the right eye 2 times daily, Disp-10 mL, R-3, E-Prescribe      latanoprost (XALATAN) 0.005 % ophthalmic solution Place 1 drop into both eyes daily., Disp-5 mL, R-11, E-Prescribe      lisinopril (ZESTRIL) 5 MG tablet Take 1 tablet (5 mg) by mouth daily., Disp-100 tablet, R-0, E-Prescribe      loperamide (IMODIUM A-D) 2 MG tablet Take 1 tablet (2 mg) by mouth 4 times daily as needed for diarrhea., Disp-40 tablet, R-1, E-Prescribe      omega-3 acid ethyl esters (LOVAZA) 1 g capsule TOME 1 CAPSULA POR LA BOCA CADA LISA, Disp-30 capsule, R-11, E-PrescribeRequesting 1 year supply      polyethylene glycol (GOLYTELY) 236 g suspension Two days before procedure at 5PM fill first container with water. Mix and drink an 8 oz glass every 15 minutes until HALF of the container is gone. Place the remainder in the refrigerator. One day before procedure at 5PM drink second half of bowel prep.  Drink an 8 oz glass every 15 minutes until it is gone. Day of procedure 6 hours before arrival time fill the 2nd container with water. Mix and drink an 8 oz glass every 15 minutes until HALF of the container is gone. Discard the remaining solution., Disp -8000 mL, R-0, E-PrescribePharmacy may substitute for  equivalent. Not a duplicate. Needs two containers for extended bowel prep      rosuvastatin (CRESTOR) 20 MG tablet Take 1 tablet (20 mg) by mouth daily., Disp-100 tablet, R-0, E-Prescribe      senna-docusate (SENOKOT-S/PERICOLACE) 8.6-50 MG tablet Take 1 tablet by mouth 2 times daily as needed (hold for loose stools), Disp-60 tablet, R-3, E-Prescribe      sirolimus (GENERIC EQUIVALENT) 0.5 MG tablet Take 6 tablets (3 mg) by mouth daily, Disp-180 tablet, R-11, E-Prescribe      sulfamethoxazole-trimethoprim (BACTRIM) 400-80 MG tablet Take 1 tablet by mouth three times a week., Disp-45 tablet, R-3, E-Prescribe      tamsulosin (FLOMAX) 0.4 MG capsule Take 1 capsule (0.4 mg) by mouth every morning., Disp-90 capsule, R-3, E-Prescribe-   Ref: [id number]           STOP taking these medications       magnesium oxide 400 MG tablet Comments:   Reason for Stopping:         mycophenolate (GENERIC EQUIVALENT) 500 MG tablet Comments:   Reason for Stopping:             Allergies   Allergies   Allergen Reactions    Heparin Heparin Induced Thrombocytopenia     HIT screen sent 7/18/2020. CORRINE confirmed positive

## 2025-05-19 ENCOUNTER — PATIENT OUTREACH (OUTPATIENT)
Dept: CARE COORDINATION | Facility: CLINIC | Age: 70
End: 2025-05-19
Payer: COMMERCIAL

## 2025-05-19 DIAGNOSIS — Z09 HOSPITAL DISCHARGE FOLLOW-UP: ICD-10-CM

## 2025-05-19 DIAGNOSIS — N18.4 ANEMIA OF CHRONIC RENAL FAILURE, STAGE 4 (SEVERE) (H): Primary | ICD-10-CM

## 2025-05-19 DIAGNOSIS — N18.4 CKD (CHRONIC KIDNEY DISEASE) STAGE 4, GFR 15-29 ML/MIN (H): ICD-10-CM

## 2025-05-19 DIAGNOSIS — Z94.1 HEART REPLACED BY TRANSPLANT (H): ICD-10-CM

## 2025-05-19 DIAGNOSIS — D63.1 ANEMIA OF CHRONIC RENAL FAILURE, STAGE 4 (SEVERE) (H): Primary | ICD-10-CM

## 2025-05-19 LAB
FERRITIN SERPL-MCNC: 451 NG/ML (ref 31–409)
IRON BINDING CAPACITY (ROCHE): 169 UG/DL (ref 240–430)
IRON SATN MFR SERPL: 28 % (ref 15–46)
IRON SERPL-MCNC: 48 UG/DL (ref 61–157)
PATH REPORT.ADDENDUM SPEC: NORMAL
PATH REPORT.COMMENTS IMP SPEC: NORMAL
PATH REPORT.FINAL DX SPEC: NORMAL
PATH REPORT.GROSS SPEC: NORMAL
PATH REPORT.RELEVANT HX SPEC: NORMAL
PHOTO IMAGE: NORMAL

## 2025-05-19 NOTE — PROGRESS NOTES
Clinic Care Coordination Contact  [facility]/Voicemail    Clinical Data: Care Coordinator Outreach    Outreach Documentation Number of Outreach Attempt   5/19/2025  12:24 PM 1     CC RN called [employer],  services at [phone]. Call was conducted with the assistance of . # [phone]     Unable to leave a message due to: Voicemail is full.      Plan: Care Coordinator will try to reach patient again in 1-2 business days.    Tessie Manrique RN, BSN, PHN Care Coordinator  Aydin Correia and Mariya Rodriguez   Phone: [phone]

## 2025-05-19 NOTE — PROGRESS NOTES
Lucian was discharged to home on 051725 after inpatient stay for DAYA, diarrhea.    Iron studies are needed to determine anemia treatment plan. Was able to add on lab orders to existing specimen from 051725    Once resulted, RN will follow up with pt to discuss need for IV iron or if Aranesp can be started.    Follow up date: 052025    Carrie Leopold, RN BSN  Anemia Services  [facility]  [email or web address]  Office: [phone]  Fax [phone]

## 2025-05-20 ENCOUNTER — PATIENT OUTREACH (OUTPATIENT)
Dept: CARE COORDINATION | Facility: CLINIC | Age: 70
End: 2025-05-20
Payer: COMMERCIAL

## 2025-05-20 DIAGNOSIS — N18.4 ANEMIA OF CHRONIC RENAL FAILURE, STAGE 4 (SEVERE) (H): Primary | ICD-10-CM

## 2025-05-20 DIAGNOSIS — D63.1 ANEMIA OF CHRONIC RENAL FAILURE, STAGE 4 (SEVERE) (H): Primary | ICD-10-CM

## 2025-05-20 DIAGNOSIS — N18.4 CKD (CHRONIC KIDNEY DISEASE) STAGE 4, GFR 15-29 ML/MIN (H): ICD-10-CM

## 2025-05-20 DIAGNOSIS — Z94.1 HEART TRANSPLANT, ORTHOTOPIC, STATUS (H): ICD-10-CM

## 2025-05-20 RX ORDER — DIPHENHYDRAMINE HYDROCHLORIDE 50 MG/ML
50 INJECTION, SOLUTION INTRAMUSCULAR; INTRAVENOUS
Start: 2025-05-20

## 2025-05-20 RX ORDER — ALBUTEROL SULFATE 90 UG/1
1-2 INHALANT RESPIRATORY (INHALATION)
Start: 2025-05-20

## 2025-05-20 RX ORDER — MEPERIDINE HYDROCHLORIDE 25 MG/ML
25 INJECTION INTRAMUSCULAR; INTRAVENOUS; SUBCUTANEOUS
OUTPATIENT
Start: 2025-05-20

## 2025-05-20 RX ORDER — EPINEPHRINE 1 MG/ML
0.3 INJECTION, SOLUTION, CONCENTRATE INTRAVENOUS EVERY 5 MIN PRN
OUTPATIENT
Start: 2025-05-20

## 2025-05-20 RX ORDER — DIPHENHYDRAMINE HYDROCHLORIDE 50 MG/ML
25 INJECTION, SOLUTION INTRAMUSCULAR; INTRAVENOUS
Start: 2025-05-20

## 2025-05-20 RX ORDER — METHYLPREDNISOLONE SODIUM SUCCINATE 40 MG/ML
40 INJECTION INTRAMUSCULAR; INTRAVENOUS
Start: 2025-05-20

## 2025-05-20 RX ORDER — ALBUTEROL SULFATE 0.83 MG/ML
2.5 SOLUTION RESPIRATORY (INHALATION)
OUTPATIENT
Start: 2025-05-20

## 2025-05-20 NOTE — PROGRESS NOTES
Anemia Management Note - Follow Up      Anemia Management Note - Follow Up        SUBJECTIVE/OBJECTIVE:     Referred by Arlin Guajardo NP  on 2023  Orders are under Hiral Huffman PA-C  Primary Diagnosis: Anemia in Chronic Kidney Disease (N18.4, D63.1)     Secondary Diagnosis:  Other-Heart Transplant (Z94.1)  Transplant [id number]  Hgb goal range:  9-10  Epo/Darbo: TBD  Aranesp  40 mcg  every two weeks for Hgb <10, In clinic- Therapy plan allowed to . Last dose   Iron regimen:  Ferrous Sulfate BID  Ferrous Sulfate  once daily, increased to BID on 502     Recent DANDY use, transfusion, IV iron: none     Labs : [id number]  RX/TX plans : 520     No history of stroke, MI, or cancers. Hx venous thrombus. Previously anticoagulated.    Contact:            Ok to leave message regarding Scheduling, billing and medical information per consent to communicate dated 310                              OK to speak with children Elisabeth Rivers and Lucian Oliveira Jr. regarding Scheduling, billing and medical information per consent to communicate dated 310           Latest Ref Rng & Units 2025 2025 2025 2025 5/15/2025 2025 2025   Anemia   Hemoglobin 13.3 - 17.7 g/dL 10.0  8.9  8.9  8.6  7.8  8.1  7.8    TSAT 15 - 46 %       28    Ferritin 31 - 409 ng/mL       451      BP Readings from Last 3 Encounters:   25 121/63   25 (!) 82/62   25 (!) 88/55     Wt Readings from Last 2 Encounters:   25 75.6 kg (166 lb 9.6 oz)   25 70.8 kg (156 lb)         ASSESSMENT:    Hgb:Not at goal/Known  TSat: not at goal of >30% Ferritin: At goal (>100ng/mL)   Goals Addressed    None         PLAN:  Start Aranesp.    Orders needed to be renewed (for next follow-up date) in EPIC: None    Iron labs due:  monthly, mid 2025    Plan discussed with:  Called Lucian with help of - no answer, VM box full    NEXT FOLLOW-UP DATE:  521    Sharmila  Leopold, RN BSN  Anemia Services  [facility]  [email or web address]  Office: [phone]  Fax [phone]

## 2025-05-20 NOTE — PROGRESS NOTES
"Clinic Care Coordination Contact  Transitions of Care Outreach  Chief Complaint   Patient presents with    Clinic Care Coordination - Post Hospital       Most Recent Admission Date: 5/9/2025   Most Recent Admission Diagnosis: Dehydration - E86.0  Hypokalemia - E87.6  Acute kidney injury - N17.9  Diarrhea, unspecified type - R19.7     Most Recent Discharge Date: 5/17/2025   Most Recent Discharge Diagnosis: Dehydration - E86.0  Diarrhea, unspecified type - R19.7  Hypokalemia - E87.6  Acute kidney injury - N17.9  Type 2 diabetes mellitus with chronic kidney disease, with long-term current use of insulin, unspecified CKD stage (H) - E11.22, Z79.4  Chronic kidney disease, unspecified CKD stage - N18.9  Heart replaced by transplant (H) - Z94.1  Type 2 diabetes mellitus with diabetic nephropathy, with long-term current use of insulin (H) - E11.21, Z79.4  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 Call was conducted with the assistance of . #[phone]. CC RN contacted patient, introduced self, role, care coordination program and reason for call. When DAVID RN asked patient how he was doing now that he is home, patient replied, \"Doing well\"  How are your symptoms? (Red Flag symptoms escalate to triage hotline per guidelines): Improved  Do you know how to contact your clinic care team if you have future questions or changes to your health status? : Yes  Does the " patient have their discharge instructions? : Yes  Does the patient have questions regarding their discharge instructions? : No (pt reports his wife helps him with this care)  Were you started on any new medications or were there changes to any of your previous medications? : Yes  Does the patient have all of their medications?: Yes  Do you have questions regarding any of your medications? : No  Do you have all of your needed medical supplies or equipment (DME)?  (i.e. oxygen tank, CPAP, cane, etc.): Yes         Post-op (Clinicians Only)  Did the patient have surgery or a procedure: No  Fever: No  Chills: No  Eating & Drinking: eating and drinking without complaints/concerns (Snacks/Supplements Adult: [employer] Standard Oral Supplement; With Meals  Advance Diet as Tolerated: Regular Diet Adult; Regular Diet Adult)  Bowel Function: normal  Date of last BM: 05/19/25  Urinary Status: voiding without complaint/concerns    CCRN Explained and offered Care Coordination support to eligible patients: Yes    Patient accepted? No    Follow up Plan     Discharge Follow-Up  Discharge follow up appointment scheduled in alignment with recommended follow up timeframe or Transitions of Risk Category? (Low = within 30 days; Moderate= within 14 days; High= within 7 days): No  Patient's follow up appointment not scheduled: Patient declined scheduling support. Education on the importance of transitions of care follow up. Provided scheduling phone number.    Future Appointments   Date Time Provider Department Center   5/29/2025  9:15 AM Bre Mi RN [facility]   6/2/2025  8:30 AM [facility] LAB GOLD WAITING [facility] O   6/2/2025  9:00 AM UUXR3 [facility] O   6/2/2025  9:30 AM [facility]UMR4 [facility] O   6/9/2025  5:00 PM Arvin Sheridan MD [facility]   6/10/2025 11:00 AM [facility]   8/20/2025  8:30 AM Triny Bunch MD [facility] CLIN       Outpatient Plan as outlined  on AVS reviewed with patient.    For any urgent concerns, please contact our 24 hour nurse triage line: [phone] ([phone])      offered to assist in making the PCP follow up visit. Patient declined, stated he would be calling his PCP's office to make his follow up appointment. He has the phone number at home.   Tessie Manrique RN

## 2025-05-21 ENCOUNTER — TELEPHONE (OUTPATIENT)
Dept: TRANSPLANT | Facility: CLINIC | Age: 70
End: 2025-05-21
Payer: COMMERCIAL

## 2025-05-21 ASSESSMENT — ENCOUNTER SYMPTOMS: NEW SYMPTOMS OF CORONARY ARTERY DISEASE: 0

## 2025-05-21 NOTE — PROGRESS NOTES
Called Lucian, he denied need for .   Discussed need to restart Aranesp d/t low hemoglobin. Confirmed he is taking oral iron BID. Discussed every other week dosing, and advised him to make a few appts to start with. Provided the [facility] phone number for scheduling at his request.   Invited him to call if any questions come up. He verbalized understanding, and said he'd call today to schedule    Follow up date: 052725    Carrie Leopold, RN BSN  Anemia Services  [facility]  [email or web address]  Office: [phone]  Fax [phone]

## 2025-05-21 NOTE — TELEPHONE ENCOUNTER
Writer called to check in with pt after discharge from the hospital.  Pt called using .  Diarrhea has resolved- he is feeling much better now. He is now taking AZA/Siro for immunosuppression.  Will discuss follow up plan with Dr. Sheridan at upcoming visit in June- whether biopsy is needed post IMS switch.  Pt states understanding.

## 2025-05-27 ENCOUNTER — PATIENT OUTREACH (OUTPATIENT)
Dept: CARE COORDINATION | Facility: CLINIC | Age: 70
End: 2025-05-27
Payer: COMMERCIAL

## 2025-05-27 LAB
COLONOSCOPY: NORMAL
UPPER GI ENDOSCOPY: NORMAL

## 2025-05-27 NOTE — PROGRESS NOTES
Anemia Management Note - Reminder     Follow-up with anemia management service:    Lucian has not yet scheduled Aranesp injection. Call placed to Lucian with  to see if he had questions about the Aranesp or wanted assistance with scheduling the injection. He notes that he was going to wait until his appt with Dr. Damon on [id number]. RN reminded him that his hgb is trending down and it's best not to wait as to avoid a transfusion. He voiced understanding and agreed to schedule appt. RN provided him with the [facility] scheduling phone number and transferred him/.         Latest Ref Rng & Units 5/11/2025 5/12/2025 5/13/2025 5/14/2025 5/15/2025 5/16/2025 5/17/2025   Anemia   Hemoglobin 13.3 - 17.7 g/dL 10.0  8.9  8.9  8.6  7.8  8.1  7.8    TSAT 15 - 46 %       28    Ferritin 31 - 409 ng/mL       451        Follow-up call date: 062325 chart dose, review OV notes    Carrie Leopold, RN BSN  Anemia Services  [facility]  [email or web address]  Office: [phone]  Fax [phone]

## 2025-05-29 ENCOUNTER — ALLIED HEALTH/NURSE VISIT (OUTPATIENT)
Dept: EDUCATION SERVICES | Facility: CLINIC | Age: 70
End: 2025-05-29
Payer: COMMERCIAL

## 2025-05-29 ENCOUNTER — TELEPHONE (OUTPATIENT)
Dept: TRANSPLANT | Facility: CLINIC | Age: 70
End: 2025-05-29

## 2025-05-29 VITALS — WEIGHT: 178.3 LBS | BODY MASS INDEX: 28.78 KG/M2

## 2025-05-29 DIAGNOSIS — E11.21 TYPE 2 DIABETES MELLITUS WITH DIABETIC NEPHROPATHY, WITH LONG-TERM CURRENT USE OF INSULIN (H): ICD-10-CM

## 2025-05-29 DIAGNOSIS — Z79.4 TYPE 2 DIABETES MELLITUS WITH DIABETIC NEPHROPATHY, WITH LONG-TERM CURRENT USE OF INSULIN (H): ICD-10-CM

## 2025-05-29 DIAGNOSIS — E11.8 TYPE 2 DIABETES MELLITUS WITH COMPLICATIONS (H): Primary | ICD-10-CM

## 2025-05-29 DIAGNOSIS — Z94.1 HEART REPLACED BY TRANSPLANT (H): Primary | ICD-10-CM

## 2025-05-29 DIAGNOSIS — Z12.5 PROSTATE CANCER SCREENING: ICD-10-CM

## 2025-05-29 DIAGNOSIS — E11.9 INSULIN-REQUIRING OR DEPENDENT TYPE II DIABETES MELLITUS (H): ICD-10-CM

## 2025-05-29 DIAGNOSIS — Z79.4 INSULIN-REQUIRING OR DEPENDENT TYPE II DIABETES MELLITUS (H): ICD-10-CM

## 2025-05-29 DIAGNOSIS — Z13.220 LIPID SCREENING: ICD-10-CM

## 2025-05-29 RX ORDER — BOLUS INSULIN PUMP, 200 UNIT 2 UNIT
1 EACH MISCELLANEOUS SEE ADMIN INSTRUCTIONS
Qty: 40 EACH | Refills: 4 | Status: SHIPPED | OUTPATIENT
Start: 2025-05-29

## 2025-05-29 RX ORDER — DIABETIC SUPPLIES,MISCELL
1 MISCELLANEOUS MISCELLANEOUS SEE ADMIN INSTRUCTIONS
Qty: 1 EACH | Refills: 5 | Status: SHIPPED | OUTPATIENT
Start: 2025-05-29

## 2025-05-29 NOTE — PROGRESS NOTES
Diabetes Self-Management Education & Support    Lucian Oliveira presents today for education related to Type 2 diabetes    Patient is being treated with:  MDI Insulin  He is accompanied by self and spouse, Shivani.     Year of diagnosis: he thinks he has had diabetes for around 20 years.   Referring provider:  Marisabel Prieto PA-C  Living Situation: Lives with his wife, Shivani  Employment: Not employed.     Potential Barriers to Control:  poor literacy skills in English and Slovenian.  Speaks and understands very limited English.  His wife, Shivani seems to be in charge of his diabetes management.       Diabetes Complications:  NPDR, CKD, CAD/CHF with heart transplant in 2020.     PATIENT CONCERNS/REASON FOR REFERRAL Follow up for ongoing DSME and also to assess if he could benefit from and manage a EpiBonequr Simplicity device for delivering rapid acting insulin.     ASSESSMENT:    Taking Medication:     Current Diabetes Management per Patient:  Taking diabetes medications? yes:     Diabetes Medication(s)       Insulin       HUMALOG KWIKPEN 100 UNIT/ML soln Give 10 units with breakfast, 10 units with lunch,  and 5 units with dinner.  Average daily use is 25 units     insulin degludec (TRESIBA) 200 UNIT/ML pen Inject 60 Units Subcutaneous every morning Order pen needles too       Sodium-Glucose Co-Transporter 2 (SGLT2) Inhibitors       empagliflozin (JARDIANCE) 25 MG TABS tablet Take 1 tablet (25 mg) by mouth daily. Solamente empiece a fabiola kerry medicamento al regresar al dieta halie lo normal. Ademas, tome kerry medicamento en el dosis de 25mg diariamente, y usted puede dejar de fabiola el dosis 10mg.            Monitoring    Patient glucose self monitoring as follows: continuously using a continuous glucose monitor (CGM)  CGM: Dexcom G7    Report appears below:         Lucian Oliveira meets the following criteria for a continuous glucose monitoring (CGM) system.      Has a diagnosis of diabetes,: This patient has a diagnosis of  Type 2 diabetes requiring multiple daily injections of insulin  Uses a personal continuous glucose monitor and checks glucose four or more times daily and has for the past 90 days as documented in glucose reports I have personally reviewed   Treated with insulin with multiple daily injections (MDI)  or a constant subcutaneous infusion (CSI) pump:  This patient is injecting insulin a minimum of three times daily.   Has additional conditions that require closer monitoring than can be obtained by blood glucose testing.  This patient  requires frequent adjustments of the insulin treatment regimen, based on therapeutic CGM test results, has severe excursions of blood glucose , and has day to day variations in work, activity and meal schedules that confound the degree of regimentation needed  to self manage glycemia with blood glucose testing  Has completed comprehensive diabetes education and training specific to the use of a CGM device.              Patient's most recent   Lab Results   Component Value Date    A1C 7.1 05/06/2025    A1C 7.1 12/05/2024    A1C 6.7 01/14/2021      Patient's A1C goal: <8.0    Activity: no regular exercise program    Healthy Eating:   Not discussed today.  Discussed in previous visits.      Problem Solving:      Patient is at risk of hypoglycemia?: YES and Frequency is less than once a month  Hospitalizations for hyper or hypoglycemia: No    EDUCATION and INSTRUCTION PROVIDED AT THIS VISIT:       This appointment was conducted with the assistance of a  over the phone (Kaila ID # [id number]).    Lucian was graduated from the endocrinology team back to primary care in May 5, 2025.  He was hospitalized shortly after this with severe diarrhea believed to be related to his immunosupressant.  He was discharged May 17 on Tresiba 50 units (lowered from 60), Humalog 10 units with each meal, and a correction scale of 1/50/150 mg/dL.      Overall his control is good.  His A1C in  December 2024 was 7.1%  His estimated A1C based on the past two weeks of data is 8.4%      He states that his appetite is back to normal.  He has gained about 10 lbs since his discharge from the hospital.  He and his wife states that he has been taking all of his medicaitons as prescribed.   He has a tendency to snack on fruit and other things between meals, and he is not covering these items, which is likely contributing to his overall high glucoses.      We discussed and I demonstrated for him how the Cequr Simplicity device works and explained that the purpose of it is to make bolusing for food and correction easier, as he states that he doesn't always carry his insulin pens with him.  He initially stated that he didn't want to use it, however when we discussed that he really needs to be covering snacks as well as meals, he changed his mind and his wife, Shivani was very much in favor of him using it.      He agrees to try it.  I will send an order in to his pharmacy once again (previously denied) for the Cequr simplicity device and scheduled an appointment in one month to hopefully start using the device.      In the meantime, it is clear that he needs more insulin.  Asked him to increase the amount of Tresiba he's taking back to 60 units and to start covering his snacks with 5 units of Novolog in addition to the 10 units he is taking with his three meals per day.      Patient-stated goal written and given to Lucian Oliveira.  Verbalized and demonstrated understanding of instructions.     PLAN:    See patient instructions  AVS printed and given to patient    FOLLOW-UP:      Appointment in one month.   Time spent with patient at today's visit was 60 minutes.        Any diabetes medication initiation or dose changes were made via the CDCES Standing Orders per the patient's referring provider. A copy of this encounter was shared with the provider.

## 2025-05-29 NOTE — TELEPHONE ENCOUNTER
Pt called using  to go over testing prep for Monday 6/2.  NPO after midnight for fasting blood draw.  Time Sirolimus for 24 hour drug trough.  No caffeine or ETOH for 12 hours prior to Cardiac MRI.  Pt states understanding prep instructions.

## 2025-06-02 ENCOUNTER — RESULTS FOLLOW-UP (OUTPATIENT)
Dept: TRANSPLANT | Facility: CLINIC | Age: 70
End: 2025-06-02

## 2025-06-02 ENCOUNTER — HOSPITAL ENCOUNTER (OUTPATIENT)
Dept: MRI IMAGING | Facility: CLINIC | Age: 70
Discharge: HOME | End: 2025-06-02
Attending: INTERNAL MEDICINE
Payer: COMMERCIAL

## 2025-06-02 ENCOUNTER — LAB (OUTPATIENT)
Dept: LAB | Facility: CLINIC | Age: 70
End: 2025-06-02
Attending: INTERNAL MEDICINE
Payer: COMMERCIAL

## 2025-06-02 ENCOUNTER — HOSPITAL ENCOUNTER (OUTPATIENT)
Dept: GENERAL RADIOLOGY | Facility: CLINIC | Age: 70
Discharge: HOME | End: 2025-06-02
Attending: INTERNAL MEDICINE
Payer: COMMERCIAL

## 2025-06-02 VITALS — RESPIRATION RATE: 14 BRPM | DIASTOLIC BLOOD PRESSURE: 69 MMHG | HEART RATE: 83 BPM | SYSTOLIC BLOOD PRESSURE: 126 MMHG

## 2025-06-02 DIAGNOSIS — Z79.4 TYPE 2 DIABETES MELLITUS WITH DIABETIC NEPHROPATHY, WITH LONG-TERM CURRENT USE OF INSULIN (H): ICD-10-CM

## 2025-06-02 DIAGNOSIS — Z12.5 PROSTATE CANCER SCREENING: ICD-10-CM

## 2025-06-02 DIAGNOSIS — E11.21 TYPE 2 DIABETES MELLITUS WITH DIABETIC NEPHROPATHY, WITH LONG-TERM CURRENT USE OF INSULIN (H): ICD-10-CM

## 2025-06-02 DIAGNOSIS — Z94.1 HEART REPLACED BY TRANSPLANT (H): ICD-10-CM

## 2025-06-02 DIAGNOSIS — Z13.220 LIPID SCREENING: ICD-10-CM

## 2025-06-02 LAB
ALBUMIN SERPL BCG-MCNC: 3.7 G/DL (ref 3.5–5.2)
ALP SERPL-CCNC: 115 U/L (ref 40–150)
ALT SERPL W P-5'-P-CCNC: 8 U/L (ref 0–70)
ANION GAP SERPL CALCULATED.3IONS-SCNC: 11 MMOL/L (ref 7–15)
AST SERPL W P-5'-P-CCNC: 19 U/L (ref 0–45)
BASOPHILS # BLD AUTO: 0 10E3/UL (ref 0–0.2)
BASOPHILS NFR BLD AUTO: 0 %
BILIRUB SERPL-MCNC: 0.2 MG/DL
BUN SERPL-MCNC: 31 MG/DL (ref 8–23)
CALCIUM SERPL-MCNC: 9 MG/DL (ref 8.8–10.4)
CHLORIDE SERPL-SCNC: 103 MMOL/L (ref 98–107)
CHOLEST SERPL-MCNC: 145 MG/DL
CK SERPL-CCNC: 58 U/L (ref 39–308)
CMV DNA SPEC NAA+PROBE-ACNC: NOT DETECTED IU/ML
CREAT SERPL-MCNC: 2.13 MG/DL (ref 0.67–1.17)
EBV DNA SERPL NAA+PROBE-ACNC: NOT DETECTED IU/ML
EGFRCR SERPLBLD CKD-EPI 2021: 32 ML/MIN/1.73M2
EOSINOPHIL # BLD AUTO: 0.1 10E3/UL (ref 0–0.7)
EOSINOPHIL NFR BLD AUTO: 2 %
ERYTHROCYTE [DISTWIDTH] IN BLOOD BY AUTOMATED COUNT: 16.9 % (ref 10–15)
EST. AVERAGE GLUCOSE BLD GHB EST-MCNC: 180 MG/DL
FASTING STATUS PATIENT QL REPORTED: ABNORMAL
FASTING STATUS PATIENT QL REPORTED: ABNORMAL
GLUCOSE SERPL-MCNC: 112 MG/DL (ref 70–99)
HBA1C MFR BLD: 7.9 %
HCO3 SERPL-SCNC: 24 MMOL/L (ref 22–29)
HCT VFR BLD AUTO: 28.9 % (ref 40–53)
HDLC SERPL-MCNC: 31 MG/DL
HGB BLD-MCNC: 8.8 G/DL (ref 13.3–17.7)
IMM GRANULOCYTES # BLD: 0.1 10E3/UL
IMM GRANULOCYTES NFR BLD: 1 %
LDLC SERPL CALC-MCNC: ABNORMAL MG/DL
LYMPHOCYTES # BLD AUTO: 1.3 10E3/UL (ref 0.8–5.3)
LYMPHOCYTES NFR BLD AUTO: 21 %
MAGNESIUM SERPL-MCNC: 2 MG/DL (ref 1.7–2.3)
MCH RBC QN AUTO: 27.4 PG (ref 26.5–33)
MCHC RBC AUTO-ENTMCNC: 30.4 G/DL (ref 31.5–36.5)
MCV RBC AUTO: 90 FL (ref 78–100)
MONOCYTES # BLD AUTO: 0.6 10E3/UL (ref 0–1.3)
MONOCYTES NFR BLD AUTO: 9 %
NEUTROPHILS # BLD AUTO: 4.1 10E3/UL (ref 1.6–8.3)
NEUTROPHILS NFR BLD AUTO: 66 %
NONHDLC SERPL-MCNC: 114 MG/DL
NRBC # BLD AUTO: 0 10E3/UL
NRBC BLD AUTO-RTO: 0 /100
PHOSPHATE SERPL-MCNC: 2.5 MG/DL (ref 2.5–4.5)
PLATELET # BLD AUTO: 301 10E3/UL (ref 150–450)
POTASSIUM SERPL-SCNC: 4.1 MMOL/L (ref 3.4–5.3)
PROT SERPL-MCNC: 6.3 G/DL (ref 6.4–8.3)
PSA SERPL DL<=0.01 NG/ML-MCNC: 0.32 NG/ML (ref 0–6.5)
RBC # BLD AUTO: 3.21 10E6/UL (ref 4.4–5.9)
SODIUM SERPL-SCNC: 138 MMOL/L (ref 135–145)
SPECIMEN TYPE: NORMAL
TRIGL SERPL-MCNC: 519 MG/DL
WBC # BLD AUTO: 6.1 10E3/UL (ref 4–11)

## 2025-06-02 PROCEDURE — 75563 CARD MRI W/STRESS IMG & DYE: CPT | Mod: 26 | Performed by: RADIOLOGY

## 2025-06-02 PROCEDURE — 82465 ASSAY BLD/SERUM CHOLESTEROL: CPT

## 2025-06-02 PROCEDURE — 75563 CARD MRI W/STRESS IMG & DYE: CPT

## 2025-06-02 PROCEDURE — 86832 HLA CLASS I HIGH DEFIN QUAL: CPT

## 2025-06-02 PROCEDURE — T1013 SIGN LANG/ORAL INTERPRETER: HCPCS

## 2025-06-02 PROCEDURE — 86352 CELL FUNCTION ASSAY W/STIM: CPT

## 2025-06-02 PROCEDURE — 84100 ASSAY OF PHOSPHORUS: CPT

## 2025-06-02 PROCEDURE — A9585 GADOBUTROL INJECTION: HCPCS | Performed by: INTERNAL MEDICINE

## 2025-06-02 PROCEDURE — 999N000127 HC STATISTIC PERIPHERAL IV START W US GUIDANCE

## 2025-06-02 PROCEDURE — 71046 X-RAY EXAM CHEST 2 VIEWS: CPT | Mod: 26 | Performed by: RADIOLOGY

## 2025-06-02 PROCEDURE — 83036 HEMOGLOBIN GLYCOSYLATED A1C: CPT

## 2025-06-02 PROCEDURE — 36415 COLL VENOUS BLD VENIPUNCTURE: CPT

## 2025-06-02 PROCEDURE — 86833 HLA CLASS II HIGH DEFIN QUAL: CPT

## 2025-06-02 PROCEDURE — 71046 X-RAY EXAM CHEST 2 VIEWS: CPT

## 2025-06-02 PROCEDURE — G0103 PSA SCREENING: HCPCS

## 2025-06-02 PROCEDURE — 250N000011 HC RX IP 250 OP 636: Mod: JZ | Performed by: RADIOLOGY

## 2025-06-02 PROCEDURE — 82550 ASSAY OF CK (CPK): CPT

## 2025-06-02 PROCEDURE — 83735 ASSAY OF MAGNESIUM: CPT

## 2025-06-02 PROCEDURE — 82435 ASSAY OF BLOOD CHLORIDE: CPT

## 2025-06-02 PROCEDURE — 85025 COMPLETE CBC W/AUTO DIFF WBC: CPT

## 2025-06-02 PROCEDURE — 255N000002 HC RX 255 OP 636: Performed by: INTERNAL MEDICINE

## 2025-06-02 PROCEDURE — 87799 DETECT AGENT NOS DNA QUANT: CPT

## 2025-06-02 PROCEDURE — 93005 ELECTROCARDIOGRAM TRACING: CPT

## 2025-06-02 RX ORDER — GADOBUTROL 604.72 MG/ML
10 INJECTION INTRAVENOUS ONCE
Status: DISCONTINUED | OUTPATIENT
Start: 2025-06-02 | End: 2025-06-03 | Stop reason: HOSPADM

## 2025-06-02 RX ORDER — AMINOPHYLLINE 25 MG/ML
50-100 INJECTION, SOLUTION INTRAVENOUS
Status: COMPLETED | OUTPATIENT
Start: 2025-06-02 | End: 2025-06-02

## 2025-06-02 RX ORDER — REGADENOSON 0.08 MG/ML
0.4 INJECTION, SOLUTION INTRAVENOUS ONCE
Status: COMPLETED | OUTPATIENT
Start: 2025-06-02 | End: 2025-06-02

## 2025-06-02 RX ORDER — CAFFEINE 200 MG
200 TABLET ORAL
OUTPATIENT
Start: 2025-06-02 | End: 2025-06-02

## 2025-06-02 RX ORDER — LIDOCAINE 40 MG/G
CREAM TOPICAL
Status: DISCONTINUED | OUTPATIENT
Start: 2025-06-02 | End: 2025-06-03 | Stop reason: HOSPADM

## 2025-06-02 RX ORDER — ALBUTEROL SULFATE 90 UG/1
2 INHALANT RESPIRATORY (INHALATION) EVERY 5 MIN PRN
Status: DISCONTINUED | OUTPATIENT
Start: 2025-06-02 | End: 2025-06-03 | Stop reason: HOSPADM

## 2025-06-02 RX ORDER — CAFFEINE CITRATE 20 MG/ML
60 SOLUTION INTRAVENOUS
OUTPATIENT
Start: 2025-06-02 | End: 2025-06-02

## 2025-06-02 RX ORDER — GADOBUTROL 604.72 MG/ML
20 INJECTION INTRAVENOUS ONCE
Status: COMPLETED | OUTPATIENT
Start: 2025-06-02 | End: 2025-06-02

## 2025-06-02 RX ADMIN — REGADENOSON 0.4 MG: 0.08 INJECTION, SOLUTION INTRAVENOUS at 10:47

## 2025-06-02 RX ADMIN — AMINOPHYLLINE 100 MG: 25 INJECTION, SOLUTION INTRAVENOUS at 10:54

## 2025-06-02 RX ADMIN — GADOBUTROL 20 ML: 604.72 INJECTION INTRAVENOUS at 10:34

## 2025-06-02 NOTE — PROGRESS NOTES
Pt here for cardiac MRI with stress. Safety checklist, allergies, and meds all reviewed. Test explained and all questions answered. Lungs clear. Denied caffeine intake. Lexiscan 0.4mg given over 15 seconds followed by 5cc NS flush. Aminophylline 100mg post Lovely injection per protocol. Pt tolerated scan, meds, and contrast well; stable throughout. Pre and post EKG completed. Pt monitored post MRI and escorted back to gold waiting room.

## 2025-06-03 LAB
ATRIAL RATE - MUSE: 80 BPM
ATRIAL RATE - MUSE: 83 BPM
DIASTOLIC BLOOD PRESSURE - MUSE: NORMAL MMHG
DIASTOLIC BLOOD PRESSURE - MUSE: NORMAL MMHG
INTERPRETATION ECG - MUSE: NORMAL
INTERPRETATION ECG - MUSE: NORMAL
P AXIS - MUSE: 14 DEGREES
P AXIS - MUSE: 4 DEGREES
PR INTERVAL - MUSE: 106 MS
PR INTERVAL - MUSE: 112 MS
QRS DURATION - MUSE: 92 MS
QRS DURATION - MUSE: 96 MS
QT - MUSE: 374 MS
QT - MUSE: 384 MS
QTC - MUSE: 431 MS
QTC - MUSE: 451 MS
R AXIS - MUSE: -15 DEGREES
R AXIS - MUSE: -16 DEGREES
SYSTOLIC BLOOD PRESSURE - MUSE: NORMAL MMHG
SYSTOLIC BLOOD PRESSURE - MUSE: NORMAL MMHG
T AXIS - MUSE: 1 DEGREES
T AXIS - MUSE: 10 DEGREES
VENTRICULAR RATE- MUSE: 80 BPM
VENTRICULAR RATE- MUSE: 83 BPM

## 2025-06-04 LAB — IMMUKNOW IMMUNE CELL FUNCTION: 653 NG/ML

## 2025-06-08 PROBLEM — N17.9 ACUTE KIDNEY INJURY: Status: RESOLVED | Noted: 2025-05-09 | Resolved: 2025-06-08

## 2025-06-08 PROBLEM — H25.11 NUCLEAR SENILE CATARACT OF RIGHT EYE: Status: RESOLVED | Noted: 2023-01-25 | Resolved: 2025-06-08

## 2025-06-08 PROBLEM — H26.9 NUCLEAR CATARACT, NONSENILE: Status: RESOLVED | Noted: 2019-11-27 | Resolved: 2025-06-08

## 2025-06-08 PROBLEM — Z12.5 PROSTATE CANCER SCREENING: Status: RESOLVED | Noted: 2023-07-17 | Resolved: 2025-06-08

## 2025-06-08 PROBLEM — K02.9 DENTAL CARIES: Status: RESOLVED | Noted: 2020-07-03 | Resolved: 2025-06-08

## 2025-06-08 PROBLEM — I25.10 CORONARY ARTERY DISEASE INVOLVING NATIVE CORONARY ARTERY OF NATIVE HEART WITHOUT ANGINA PECTORIS: Status: ACTIVE | Noted: 2020-06-18

## 2025-06-08 PROBLEM — Z98.890 STATUS POST CORONARY ANGIOGRAM: Status: RESOLVED | Noted: 2020-07-02 | Resolved: 2025-06-08

## 2025-06-08 PROBLEM — R19.7 DIARRHEA, UNSPECIFIED TYPE: Status: RESOLVED | Noted: 2025-05-09 | Resolved: 2025-06-08

## 2025-06-08 PROBLEM — E86.0 DEHYDRATION: Status: RESOLVED | Noted: 2025-05-09 | Resolved: 2025-06-08

## 2025-06-08 PROBLEM — Z79.2 NEED FOR PROPHYLACTIC ANTIBIOTIC: Status: RESOLVED | Noted: 2020-10-05 | Resolved: 2025-06-08

## 2025-06-09 ENCOUNTER — OFFICE VISIT (OUTPATIENT)
Dept: CARDIOLOGY | Facility: CLINIC | Age: 70
End: 2025-06-09
Attending: INTERNAL MEDICINE
Payer: COMMERCIAL

## 2025-06-09 VITALS
SYSTOLIC BLOOD PRESSURE: 138 MMHG | BODY MASS INDEX: 29.54 KG/M2 | WEIGHT: 183 LBS | HEART RATE: 86 BPM | DIASTOLIC BLOOD PRESSURE: 73 MMHG | OXYGEN SATURATION: 100 %

## 2025-06-09 DIAGNOSIS — K21.9 GASTROESOPHAGEAL REFLUX DISEASE WITHOUT ESOPHAGITIS: ICD-10-CM

## 2025-06-09 DIAGNOSIS — D63.1 ANEMIA OF CHRONIC RENAL FAILURE, STAGE 4 (SEVERE) (H): ICD-10-CM

## 2025-06-09 DIAGNOSIS — E83.42 HYPOMAGNESEMIA: ICD-10-CM

## 2025-06-09 DIAGNOSIS — Z94.1 HEART TRANSPLANT, ORTHOTOPIC, STATUS (H): ICD-10-CM

## 2025-06-09 DIAGNOSIS — N18.4 ANEMIA OF CHRONIC RENAL FAILURE, STAGE 4 (SEVERE) (H): ICD-10-CM

## 2025-06-09 DIAGNOSIS — N18.4 CKD (CHRONIC KIDNEY DISEASE) STAGE 4, GFR 15-29 ML/MIN (H): ICD-10-CM

## 2025-06-09 DIAGNOSIS — Z94.1 HEART REPLACED BY TRANSPLANT (H): ICD-10-CM

## 2025-06-09 PROCEDURE — 1126F AMNT PAIN NOTED NONE PRSNT: CPT | Performed by: INTERNAL MEDICINE

## 2025-06-09 PROCEDURE — 3075F SYST BP GE 130 - 139MM HG: CPT | Performed by: INTERNAL MEDICINE

## 2025-06-09 PROCEDURE — 3078F DIAST BP <80 MM HG: CPT | Performed by: INTERNAL MEDICINE

## 2025-06-09 PROCEDURE — 99215 OFFICE O/P EST HI 40 MIN: CPT | Performed by: INTERNAL MEDICINE

## 2025-06-09 PROCEDURE — G0463 HOSPITAL OUTPT CLINIC VISIT: HCPCS | Performed by: INTERNAL MEDICINE

## 2025-06-09 RX ORDER — PANTOPRAZOLE SODIUM 40 MG/1
40 TABLET, DELAYED RELEASE ORAL DAILY
Qty: 90 TABLET | Refills: 3 | Status: SHIPPED | OUTPATIENT
Start: 2025-06-09

## 2025-06-09 RX ORDER — SIROLIMUS 0.5 MG/1
3 TABLET, FILM COATED ORAL DAILY
Qty: 180 TABLET | Refills: 11 | Status: SHIPPED | OUTPATIENT
Start: 2025-06-09

## 2025-06-09 RX ORDER — AZATHIOPRINE 50 MG/1
50 TABLET ORAL DAILY
Qty: 90 TABLET | Refills: 3 | Status: SHIPPED | OUTPATIENT
Start: 2025-06-09

## 2025-06-09 RX ORDER — FERROUS SULFATE 325(65) MG
325 TABLET ORAL 2 TIMES DAILY WITH MEALS
Qty: 180 TABLET | Refills: 3 | Status: SHIPPED | OUTPATIENT
Start: 2025-06-09

## 2025-06-09 RX ORDER — CALCIUM CARBONATE/VITAMIN D3 600 MG-10
1 TABLET ORAL 2 TIMES DAILY WITH MEALS
Qty: 180 TABLET | Refills: 3 | Status: SHIPPED | OUTPATIENT
Start: 2025-06-09

## 2025-06-09 ASSESSMENT — PAIN SCALES - GENERAL: PAINLEVEL_OUTOF10: NO PAIN (0)

## 2025-06-09 NOTE — NURSING NOTE
Transplant Coordinator Note    Reason for visit: 5th annual  Coordinator: Present   Caregiver:      Health concerns addressed today:  1. Was hospitalized for MMF toxicity- switched to AZA- diarrhea improved.  2. Anemic- needs to follow-up with Anemia services.  3.       Immunosuppressants:  Aza 50mg daily  Siro 3mg daily Goal 6-8      Routine screenings:    Derm: Due  Dental: Due  Colonoscopy: Done 5/25  Breast/Prostate: PSA 0.32  Eye: UTD  Flu/Pneumonia: Flu/Covid- done, Pneumo- done, Shingrix 2/2, RSV?    Labs:       Additional Notes:         Meds, labs, testing reviewed with patient  Medication record reviewed and reconciled  Questions and concerns addressed  Pt verbalized an understanding of plan of care.     Patient Instructions:   1. We would like you to get a biopsy in 1-2 weeks.  We will call you to schedule.  2. You need to follow up with the anemia clinic- we will have them call you.  3. This Fall you should get the RSV vaccine if it is available.  4. Make an appointment with the Dentist this year.    Next transplant clinic appointment:  We will call you with date heart biopsy and labs.  Next lab draw:   Coordinator will call with all pending results.     Please call transplant coordinator with any questions:    Christelle Pettit RN BSN   Post Heart Transplant Nurse Coordinator  [facility]  Questions: [phone]

## 2025-06-09 NOTE — NURSING NOTE
Chief Complaint   Patient presents with    Follow Up     RETURN HEART TRANSPLANT       Vitals were taken, medications reconciled.    LEYLA Petty    5:11 PM

## 2025-06-09 NOTE — PATIENT INSTRUCTIONS
Patient Instructions:   1. We would like you to get a biopsy in 1-2 weeks.  We will call you to schedule.  2. You need to follow up with the anemia clinic- we will have them call you.  3. This Fall you should get the RSV vaccine if it is available.  4. Make an appointment with the Dentist this year.    Next transplant clinic appointment:  We will call you with date heart biopsy and labs.  Next lab draw:   Coordinator will call with all pending results.     Please call transplant coordinator with any questions:    Christelle Pettit RN BSN   Post Heart Transplant Nurse Coordinator  [facility]  Questions: [phone]

## 2025-06-09 NOTE — LETTER
6/9/2025      RE: Lucian Oliveira  [address] [facility] [address]       Dear Colleague,    Thank you for the opportunity to participate in the care of your patient, Lucian Oliveira, at the [facility] [place] [facility]. Please see a copy of my visit note below.      Argelia 10, 2025      ADULT HEART TRANSPLANT CLINIC    Mr. 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  Switched from MMF to azathioprine.    He is returning today for his 5-year annual follow-up.  He was hospitalized in May for severe diarrhea.  He had an extensive workup including colonoscopy, endoscopy and stool studies.  His colonoscopy revealed evidence of patchy colitis however the biopsy was inconclusive for mycophenolate induced colitis.  His CMV was negative.  There is no evidence of infectious etiology.  Empirically, he was switched from mycophenolate 1500 mg twice daily to azathioprine 50 mg daily.      He is home now for the last 3 weeks.  He is feeling better.  No further diarrhea.  He has been having lower extremity swelling since the hospitalization however this is getting better on low-dose furosemide.  No other symptoms.  No chest pain or chest pressure.  No shortness of breath.  No lightheadedness dizziness syncope.  Review of system otherwise unremarkable.  He is followed by endocrinology at the  and his diabetes is under reasonable control reportedly.      PAST MEDICAL HISTORY:  Past Medical History:   Diagnosis Date      CAD (coronary artery disease)      CHF (congestive heart failure) (H)      CKD (chronic kidney disease), stage III (H)      Cortical cataract of both eyes      Diabetes (H)      E. coli colitis 10/12/2023     Hyperlipidemia      Hypertension      Infection due to Streptococcus mitis group 09/23/2020     Ischemic cardiomyopathy      Norovirus 10/12/2023     Obesity      CHANTEL (obstructive sleep apnea)     occas cpap     CHANTEL (obstructive sleep apnea)- severe (AHI 30)      Osteoarthritis          CURRENT MEDICATIONS:  Current Outpatient Medications   Medication Sig Dispense Refill     azaTHIOprine (IMURAN) 50 MG tablet Take 1 tablet (50 mg) by mouth daily. 90 tablet 3     calcium carbonate-vitamin D (CALTRATE) 600-10 MG-MCG per tablet Take 1 tablet by mouth 2 times daily (with meals). 180 tablet 3     ferrous sulfate (FEROSUL) 325 (65 Fe) MG tablet Take 1 tablet (325 mg) by mouth 2 times daily (with meals). 180 tablet 3     magnesium chloride 535 (64 Mg) MG TBEC CR tablet Take 1 tablet (535 mg) by mouth daily. 90 tablet 3     pantoprazole (PROTONIX) 40 MG EC tablet Take 1 tablet (40 mg) by mouth daily. 90 tablet 3     sirolimus (GENERIC EQUIVALENT) 0.5 MG tablet Take 6 tablets (3 mg) by mouth daily. 180 tablet 11     acetaminophen (TYLENOL) 325 MG tablet Take 3 tablets (975 mg) by mouth every 8 hours as needed for mild pain 60 tablet 3     Alcohol Swabs PADS Use to swab the area of the injection or sergio as directed   Per insurance coverage 100 each 0     aspirin (ASA) 81 MG chewable tablet Take 1 tablet (81 mg) by mouth daily 120 tablet 0     bisacodyl (DULCOLAX) 5 MG EC tablet Two days prior to exam take two (2) tablets at 4pm. One day prior to exam take two (2) tablets at 4pm 4 tablet 0     blood glucose (NO BRAND SPECIFIED) test strip Use to test blood sugar 4 times daily or as directed. 400 strip 3     blood glucose monitoring (ACCU-CHEK FELIPE SMARTVIEW) meter device kit Use to test blood sugar 3-4 times daily,  as directed. 1 kit 0     blood glucose monitoring (SOFTCLIX) lancets 1 each 4 times daily Use to test blood sugars 2 times daily. 400 each 8     calcium carbonate (TUMS) 500 MG chewable tablet Take 2 tablets (1,000 mg) by mouth 4 times daily as needed for heartburn. (Patient not taking: Reported on 6/10/2025) 60 tablet 0     Continuous Glucose Sensor (DEXCOM G7 SENSOR) MIS CAMBIE CADA 10 MCINTYRE 10 each 2     empagliflozin (JARDIANCE) 25 MG TABS tablet Take 1 tablet (25 mg) by mouth daily. Solamente empiece a fabiola kerry medicamento al regresar al dieta halie lo normal. Ademas, tome kerry medicamento en el dosis de 25mg diariamente, y usted puede dejar de fabiola el dosis 10mg. 30 tablet 0     furosemide (LASIX) 20 MG tablet Take 1 tablet (20 mg) by mouth daily. 90 tablet 3     HUMALOG KWIKPEN 100 UNIT/ML soln Give 10 units with breakfast, 10 units with lunch,  and 5 units with dinner.  Average daily use is 25 units 90 mL 3     Injection Device for insulin (CEQUR SIMPLICITY 2U) KALI 1 each See Admin Instructions. Change patch every 2-3 days 40 each 4     Injection Device for Insulin (CEQUR SIMPLICITY ) MISC 1 each See Admin Instructions. Use with patches. Change patch every 2-3 days 1 each 5     insulin degludec (TRESIBA) 200 UNIT/ML pen Inject 60 Units Subcutaneous every morning Order pen needles too 90 mL 11     insulin pen needle (32G X 4 MM) 32G X 4 MM miscellaneous Use 5-6 pen needles daily or as directed. 600 each 2     ketorolac (ACULAR) 0.5 % ophthalmic solution Place 1 drop into the right eye 2 times daily 10 mL 3     latanoprost (XALATAN) 0.005 % ophthalmic solution Place 1 drop into both eyes daily. 5 mL 11     lisinopril (ZESTRIL) 5 MG tablet Take 1 tablet (5 mg) by mouth daily. 100 tablet 0     omega-3 acid ethyl esters (LOVAZA) 1 g capsule TOME 1 CAPSULA POR LA BOCA CADA LISA 30 capsule 11     polyethylene glycol (GOLYTELY) 236 g suspension Two days before procedure at 5PM fill first container with  water. Mix and drink an 8 oz glass every 15 minutes until HALF of the container is gone. Place the remainder in the refrigerator. One day before procedure at 5PM drink second half of bowel prep. Drink an 8 oz glass every 15 minutes until it is gone. Day of procedure 6 hours before arrival time fill the 2nd container with water. Mix and drink an 8 oz glass every 15 minutes until HALF of the container is gone. Discard the remaining solution. 8000 mL 0     potassium & sodium phosphates (NEUTRA-PHOS) 280-160-250 MG Packet Take 2 packets by mouth daily. 100 packet 0     rosuvastatin (CRESTOR) 20 MG tablet Take 1 tablet (20 mg) by mouth daily. 100 tablet 0     sulfamethoxazole-trimethoprim (BACTRIM) 400-80 MG tablet Take 1 tablet by mouth three times a week. 45 tablet 3     tamsulosin (FLOMAX) 0.4 MG capsule Take 1 capsule (0.4 mg) by mouth every morning. 90 capsule 3     Current Facility-Administered Medications   Medication Dose Route Frequency Provider Last Rate Last Admin     bevacizumab (AVASTIN) intravitreal inj 1.25 mg  1.25 mg Intravitreal Q28 Days Triny Bunch MD   1.25 mg at 05/07/25 1226       EXAM:  /73 (BP Location: Left arm, Patient Position: Chair, Cuff Size: Adult Regular)   Pulse 86   Wt 83 kg (183 lb)   SpO2 100%   BMI 29.54 kg/m    He was awake, alert, oriented x3.  He was comfortable.  He was in no apparent distress.  He had no pallor, cyanosis or jaundice.  His neck exam revealed no jugular venous distention.  His carotids were 2+ bilaterally.  His pulse was regular and rhythm.  Cardiac auscultation revealed normal S1 and S2 with no murmur rub or gallop.  Auscultation of the lungs revealed equal air entry on both sides.  His abdomen was soft with normal bowels with no tenderness no rigidity no guarding.  He had no focal neurological deficit.  His extremities showed 2+ edema bilaterally    Testing:    Cardiac MRI Stress (06/2025):     1. Surgical changes s/p heart transplant. The  LV is normal in cavity size and wall thickness. The global  systolic function is normal. The LVEF is 63%. There are no regional wall motion abnormalities.     2. The RV is normal in cavity size. The global systolic function is normal. The RVEF is 58%.      3. Left atrium is large consistent with heart transplant.     4. There is no significant valvular disease.      5. Late gadolinium enhancement imaging shows small focal midmyocardial and basal midwall and epicardial LGE  in the inferoseptal segments and epicardial midmyocardial anterior segment.      6. Regadenoson stress perfusion imaging shows no ischemia.     7. There is no pericardial effusion or thickening.     8.  There is no intracardiac thrombus.     CONCLUSIONS: s/p cardiac transplant. Normal LV (LVEF 63%) and RV (RVEF 58%) functions. No evidence of  perfusion defects on Regadenoson stress imaging. Small mesocardial and epicardial foci of LGE in the  midventricular and basal inferoseptal and midventricular anterior segments in a non-ischemic pattern.      Compared to the CMR from 09/26/2022, biventricular function is unchanged; non-ischemic LGE pattern and  severity are unchanged.     Coronary angiogram (07/2023):  Left Main   The vessel is large. Eccentric plaque 0.3-0.5 mm.      Left Anterior Descending   The vessel is large.      First Diagonal Branch   The vessel is small and is angiographically normal.      Second Diagonal Branch   The vessel is small and is angiographically normal.      Left Circumflex   The vessel is moderate in size.      First Obtuse Marginal Branch   The vessel is moderate in size and is angiographically normal.      Second Obtuse Marginal Branch   The vessel is moderate in size and is angiographically normal.      Right Coronary Artery   The vessel is moderate in size.      Right Posterior Descending Artery   The vessel is moderate in size and is angiographically normal.      Right Posterior Atrioventricular Artery   The vessel  is moderate in size and is angiographically normal.      First Right Posterolateral Branch   The vessel is small and is angiographically normal.      Second Right Posterolateral Branch   The vessel is small and is angiographically normal.         Intervention        Recent Results (from the past 4 weeks)   Glucose by meter    Collection Time: 05/13/25 12:42 PM   Result Value Ref Range    GLUCOSE BY METER POCT 136 (H) 70 - 99 mg/dL   Microsporidia stain    Collection Time: 05/13/25  2:11 PM    Specimen: Per Rectum; Stool   Result Value Ref Range    Microsporidia Stain Negative Negative   Routine parasitology exam    Collection Time: 05/13/25  2:11 PM    Specimen: Per Rectum; Stool   Result Value Ref Range    OVA AND PARASITE EXAM Negative Negative    WBC'S, O&P 2+ PMNs     Parasitology Exam Specimen Type Stool    Glucose by meter    Collection Time: 05/13/25  5:03 PM   Result Value Ref Range    GLUCOSE BY METER POCT 194 (H) 70 - 99 mg/dL   Glucose by meter    Collection Time: 05/13/25  8:12 PM   Result Value Ref Range    GLUCOSE BY METER POCT 115 (H) 70 - 99 mg/dL   Glucose by meter    Collection Time: 05/13/25  9:31 PM   Result Value Ref Range    GLUCOSE BY METER POCT 121 (H) 70 - 99 mg/dL   Potassium    Collection Time: 05/13/25  9:52 PM   Result Value Ref Range    Potassium 3.3 (L) 3.4 - 5.3 mmol/L   Glucose by meter    Collection Time: 05/14/25 12:22 AM   Result Value Ref Range    GLUCOSE BY METER POCT 124 (H) 70 - 99 mg/dL   Comprehensive metabolic panel    Collection Time: 05/14/25  3:55 AM   Result Value Ref Range    Sodium 139 135 - 145 mmol/L    Potassium 3.3 (L) 3.4 - 5.3 mmol/L    Carbon Dioxide (CO2) 31 (H) 22 - 29 mmol/L    Anion Gap 11 7 - 15 mmol/L    Urea Nitrogen 63.0 (H) 8.0 - 23.0 mg/dL    Creatinine 1.90 (H) 0.67 - 1.17 mg/dL    GFR Estimate 37 (L) >60 mL/min/1.73m2    Calcium 9.0 8.8 - 10.4 mg/dL    Chloride 97 (L) 98 - 107 mmol/L    Glucose 121 (H) 70 - 99 mg/dL    Alkaline Phosphatase 116 40 -  150 U/L    AST 22 0 - 45 U/L    ALT 10 0 - 70 U/L    Protein Total 6.3 (L) 6.4 - 8.3 g/dL    Albumin 3.9 3.5 - 5.2 g/dL    Bilirubin Total 0.3 <=1.2 mg/dL   Magnesium    Collection Time: 05/14/25  3:55 AM   Result Value Ref Range    Magnesium 1.6 (L) 1.7 - 2.3 mg/dL   Phosphorus    Collection Time: 05/14/25  3:55 AM   Result Value Ref Range    Phosphorus 1.4 (L) 2.5 - 4.5 mg/dL   INR    Collection Time: 05/14/25  3:55 AM   Result Value Ref Range    INR 1.13 0.85 - 1.15    PT 14.5 11.8 - 14.8 Seconds   Potassium    Collection Time: 05/14/25  3:55 AM   Result Value Ref Range    Potassium 3.3 (L) 3.4 - 5.3 mmol/L   CBC with platelets and differential    Collection Time: 05/14/25  3:55 AM   Result Value Ref Range    WBC Count 7.4 4.0 - 11.0 10e3/uL    RBC Count 3.10 (L) 4.40 - 5.90 10e6/uL    Hemoglobin 8.6 (L) 13.3 - 17.7 g/dL    Hematocrit 26.3 (L) 40.0 - 53.0 %    MCV 85 78 - 100 fL    MCH 27.7 26.5 - 33.0 pg    MCHC 32.7 31.5 - 36.5 g/dL    RDW 15.6 (H) 10.0 - 15.0 %    Platelet Count 201 150 - 450 10e3/uL    % Neutrophils 73 %    % Lymphocytes 16 %    % Monocytes 9 %    % Eosinophils 1 %    % Basophils 0 %    % Immature Granulocytes 0 %    NRBCs per 100 WBC 0 <1 /100    Absolute Neutrophils 5.4 1.6 - 8.3 10e3/uL    Absolute Lymphocytes 1.2 0.8 - 5.3 10e3/uL    Absolute Monocytes 0.7 0.0 - 1.3 10e3/uL    Absolute Eosinophils 0.1 0.0 - 0.7 10e3/uL    Absolute Basophils 0.0 0.0 - 0.2 10e3/uL    Absolute Immature Granulocytes 0.0 <=0.4 10e3/uL    Absolute NRBCs 0.0 10e3/uL   Glucose by meter    Collection Time: 05/14/25  4:08 AM   Result Value Ref Range    GLUCOSE BY METER POCT 118 (H) 70 - 99 mg/dL   COLONOSCOPY    Collection Time: 05/14/25  7:28 AM   Result Value Ref Range    COLONOSCOPY       [address] [place] [address] [phone]     Endoscopy Department  _______________________________________________________________________________  Patient Name: Lucian  Tee           Procedure Date: 5/14/2025 7:28 AM  MRN: [id number]                       Account Number: [id number]  YOB: 1955             Admit Type: Inpatient  Age: 69                               Room: [place] [address]  Gender: Male                          Note Status: Finalized  Attending MD: AMARIS SARABIA MD,   Total Sedation Time:   _______________________________________________________________________________     Procedure:             Colonoscopy  Indications:           Chronic diarrhea  Providers:             AMARIS SARABIA MD, Mehrdad Gonzales RN, Arlen Allan RN, ARA LOPEZ  Patient Profile:       67 y/o male with a history of orthotopic heart                          transplant 7/19/2020 on MMF who is he re for an EGD for                          weight loss and colonoscopy for diarrhea  Referring MD:            Medicines:             Midazolam 2 mg IV, Fentanyl 25 micrograms IV  Complications:         No immediate complications.  _______________________________________________________________________________  Procedure:             Pre-Anesthesia Assessment:                         - Prior to the procedure, a History and Physical was                          performed, and patient medications and allergies were                          reviewed. The patient is competent. The risks and                          benefits of the procedure and the sedation options and                          risks were discussed with the patient. All questions                          were answered and informed consent was obtained.                          Patient identification and proposed procedure were                          verified by the physician and the nurse in the                          [facility] room. Mental Status Examination: alert and                          oriented. Airway Examination: normal oropharyngeal                          airway and neck  mobility. Respiratory Examination:                          clear to auscultation. CV Examination: normal.                          Prophylactic Antibiotics: The patient does not require                          prophylactic antibiotics. Prior Anticoagulants: The                          patient has taken no anticoagulant or antiplatelet                          agents. ASA Grade Assessment: II - A patient with mild                          systemic disease. After reviewing the risks and                          benefits, the patient was deemed in satisfactory                          condition to undergo the procedure. The anesthesia                          plan was to use moderate sedation / analgesia                          (conscious sedation). Immediately prior to                          administration of medicat ions, the patient was                          re-assessed for adequacy to receive sedatives. The                          heart rate, respiratory rate, oxygen saturations,                          blood pressure, adequacy of pulmonary ventilation, and                          response to care were monitored throughout the                          procedure. The physical status of the patient was                          re-assessed after the procedure.                         After obtaining informed consent, the colonoscope was                          passed under direct vision. Throughout the procedure,                          the patient's blood pressure, pulse, and oxygen                          saturations were monitored continuously. The Olympus                          Adult Colonoscope was introduced through the anus and                          advanced to the terminal ileum. The colonoscopy was                          performed without difficulty. The patient tolerated                           the procedure well. The quality of the bowel                          preparation was  fair.                                                                                   Findings:       The perianal and digital rectal examinations were normal.       A moderate amount of stool was found in the entire colon, interfering        with visualization.       Normal mucosa was found in the entire colon. Biopsies were taken with a        cold forceps for histology.       The terminal ileum contained a single localized non-bleeding erosion. No        stigmata of recent bleeding were seen. Biopsies were taken with a cold        forceps for histology.                                                                                   Moderate Sedation:       Moderate (conscious) sedation was administered by the nurse and        supervised by the endoscopist. The following parameters were monitored:        oxygen saturation, heart rate, blood pressure, and response to care .        Total physician intraservice time was 34 minutes.  Impression:            - Preparation of the colon was fair.                         - Stool in the entire examined colon.                         - Normal mucosa in the entire examined colon. Biopsied.                         - A single erosion in the terminal ileum. Biopsied.  Recommendation:        - [facility] to [facility] for [facility] care.                         - Await pathology results                                                                                     Signed Electronically by Amaris Sarabia MD  _____________________  AMARIS SARABIA MD  5/27/2025 1:36:26 PM  I was physically present for the entire viewing portion of the exam.  __________________________  Signature of teaching physician  [id number] SARABIA MD    ______________  ARA LOPEZ,   Number of Addenda: 0    Note Initiated On: 5/14/2025 7:28 AM  Scope In: 1:11:38 PM  Scope Out: 1:43:23 PM     Potassium    Collection Time: 05/14/25 10:44 AM   Result Value Ref Range    Potassium 4.2  3.4 - 5.3 mmol/L   UPPER GI ENDOSCOPY    Collection Time: 05/14/25 11:50 AM   Result Value Ref Range    Upper GI Endoscopy       [address] [place] [address] [phone]     Endoscopy Department  _______________________________________________________________________________  Patient Name: Lucian Oliveira           Procedure Date: 5/14/2025 11:50 AM  MRN: [id number]                       Account Number: [id number]  YOB: 1955             Admit Type: Inpatient  Age: 69                               Room: [place] [address]  Gender: Male                          Note Status: Finalized  Attending MD: AMARIS SARABIA MD,   Total Sedation Time:   _______________________________________________________________________________     Procedure:             Upper GI endoscopy  Indications:           weight loss  Providers:             AMARIS SARABIA MD, Mehrdad Gonzales RN, Arlen Allan RN, ARA LOPEZ  Patient Profile:       67 y/o male with a history of orthotopic heart                          transplant 7/19/2020 on MMF who is  here for an EGD for                          weight loss and colonoscopy for diarrhea  Referring MD:          BRENDA DAI  Medicines:             Midazolam 3 mg IV, Fentanyl 100 micrograms IV  Complications:         No immediate complications.  _______________________________________________________________________________  Procedure:             Pre-Anesthesia Assessment:                         - Prior to the procedure, a History and Physical was                          performed, and patient medications and allergies were                          reviewed. The patient is competent. The risks and                          benefits of the procedure and the sedation options and                          risks were discussed with the patient. All questions                          were answered and  informed consent was obtained.                          Patient identification and proposed procedure were                          verified by the physician and the nurse in the                           procedure room. Mental Status Examination: alert and                          oriented. Airway Examination: normal oropharyngeal                          airway and neck mobility. Respiratory Examination:                          clear to auscultation. CV Examination: normal.                          Prophylactic Antibiotics: The patient does not require                          prophylactic antibiotics. Prior Anticoagulants: The                          patient has taken no anticoagulant or antiplatelet                          agents. ASA Grade Assessment: II - A patient with mild                          systemic disease. After reviewing the risks and                          benefits, the patient was deemed in satisfactory                          condition to undergo the procedure. The anesthesia                          plan was to use moderate sedation / analgesia                          (conscious sedation). Immediately prior to                          adminis tration of medications, the patient was                          re-assessed for adequacy to receive sedatives. The                          heart rate, respiratory rate, oxygen saturations,                          blood pressure, adequacy of pulmonary ventilation, and                          response to care were monitored throughout the                          procedure. The physical status of the patient was                          re-assessed after the procedure.                         After obtaining informed consent, the endoscope was                          passed under direct vision. Throughout the procedure,                          the patient's blood pressure, pulse, and oxygen                          saturations were monitored  continuously. The Endoscope                          was introduced through the mouth, and advanced to the                          second part of duodenum. The upper GI endoscopy was                          accomplished without difficulty. Th e patient tolerated                          the procedure well.                                                                                   Findings:       The examined esophagus was normal.       Bits of old hematin (altered blood/coffee-ground-like material) was        found in the stomach.       Mild gastropathy was noted in the antrum. A jet of water elicited faint        bleeding, which explains the bits of hematin. Biopsies were taken with a        cold forceps for histology.       Normal mucosa was found in the entire duodenum. Biopsies were taken with        a cold forceps for histology.                                                                                   Moderate Sedation:       Moderate (conscious) sedation was administered by the nurse and        supervised by the endoscopist. The following parameters were monitored:        oxygen saturation, heart rate, blood pressure, and response to care.        Total physician intraservice time was 17 minutes.  Impres maddy:            - Normal esophagus.                         - Mild gastropathy in the antrum.                         - Normal mucosa was found in the entire examined                          duodenum. Biopsied for  Recommendation:        - Await pathology results.                         - Recommend omeprazole 40 mg daily                                                                                     Signed Electronically by Amaris Sarabia MD  _____________________  AMARIS SARABIA MD  5/27/2025 1:35:39 PM  I was physically present for the entire viewing portion of the exam.  __________________________  Signature of teaching physician  Johan/Codey SARABIA ,  MD    ______________  ARA LOPEZ,   Number of Addenda: 0    Note Initiated On: 5/14/2025 11:50 AM  Scope In:  Scope Out:     Surgical Pathology Exam    Collection Time: 05/14/25 12:59 PM   Result Value Ref Range    Case Report       Surgical Pathology Report                         Case: [id number]                                  Authorizing Provider:  Brendan Aldrich MD     Collected:           05/14/2025 12:59 PM          Ordering Location:     [facility]          Received:            05/14/2025 01:53 PM                                 Endoscopy                                                                    Pathologist:           Kvng Sawyer DO                                                            Specimens:   A) - Small Intestine, Duodenum, 2nd portion                                                         B) - Stomach, Gastric biopsy                                                                        C) - Small Intestine, Terminal Ileum, Terminal ileum biopsy                                         D) - Large Intestine, Colon, Random Colon                                                  Addendum       CMV immunohistochemical stains performed on parts C. and D. are negative  Analyte Specific Reagents (ASRs) are used in many laboratory tests necessary for standard medical care and generally do not require FDA approval. This test was developed and its performance characteristics determined by [facility]. It has not been cleared or approved by the U.S. Food and Drug Administration.  [facility] are certified for the performance of high-complexity clinical testing under the Clinical Laboratory Improvement Amendments of 1988 (CLIA), and in keeping with the certification requirements, the laboratory has verified this test's accuracy, precision and/or validity of the method.        Final Diagnosis       A. 2ND PORTION:  -  "Unremarkable duodenal mucosa  - No evidence of celiac disease    B. GASTRIC BIOPSY:  - Mild reactive gastropathy  - No H. pylori-like organisms identified on routine staining  - No intestinal metaplasia identified    C. TERMINAL ILEUM BIOPSY:  - Ileal mucosa with mild architectural changes; see comment    D. RANDOM COLON:  - Focal active colitis; see comment        Comment       Sections of terminal ileum and random colon show focal neutrophilic cryptitis, mild architectural changes, and rare crypt apoptosis that are nondiagnostic of MMF toxicity.  CMV immunohistochemical stains are pending.      Clinical Information       Diarrhea      Gross Description       A(1). Small Intestine, Duodenum, 2nd portion:  The specimen is received in formalin with proper patient identification labeled \" duodenum second portion\".  The specimen consists of 5 tan pieces of soft tissue, ranging from 0.2-0.3 cm in greatest dimension.  Entirely submitted in cassette A1.    B(2). Stomach, Gastric biopsy:  The specimen is received in formalin with proper patient identification labeled \" gastric biopsy\".  The specimen consists of 4 tan pieces of soft tissue, ranging from 0.1-0.3 cm in greatest dimension.  Entirely submitted in cassette B1.    C(3). Small Intestine, Terminal Ileum, Terminal ileum biopsy:  The specimen is received in formalin with proper patient identification labeled \" terminal ileum biopsy\".  The specimen consists of a single tan piece of soft tissue, measuring 0.4 cm in greatest dimension.  Entirely submitted in cassette C1.    D(4). Large Intestine, Colon, Random Colon:  The specimen is received in formalin with proper patient identification labeled \" random colon\".  The specimen consists of 8 tan pieces of soft tissue, ranging from 0.2-0.3 cm in greatest dimension.  Entirely submitted in cassette D1.        Performing Labs       The technical component of this testing was completed at [facility] of " [facility].    Stain controls for all stains resulted within this report have been reviewed and show appropriate reactivity.       Case Images     Potassium    Collection Time: 05/14/25  2:16 PM   Result Value Ref Range    Potassium 4.1 3.4 - 5.3 mmol/L   Glucose by meter    Collection Time: 05/14/25  4:20 PM   Result Value Ref Range    GLUCOSE BY METER POCT 93 70 - 99 mg/dL   Phosphorus    Collection Time: 05/14/25  8:24 PM   Result Value Ref Range    Phosphorus 1.7 (L) 2.5 - 4.5 mg/dL   Glucose by meter    Collection Time: 05/14/25  9:40 PM   Result Value Ref Range    GLUCOSE BY METER POCT 185 (H) 70 - 99 mg/dL   Magnesium    Collection Time: 05/15/25  5:56 AM   Result Value Ref Range    Magnesium 2.0 1.7 - 2.3 mg/dL   Phosphorus    Collection Time: 05/15/25  5:56 AM   Result Value Ref Range    Phosphorus 1.8 (L) 2.5 - 4.5 mg/dL   Basic metabolic panel    Collection Time: 05/15/25  5:56 AM   Result Value Ref Range    Sodium 142 135 - 145 mmol/L    Potassium 4.0 3.4 - 5.3 mmol/L    Chloride 104 98 - 107 mmol/L    Carbon Dioxide (CO2) 29 22 - 29 mmol/L    Anion Gap 9 7 - 15 mmol/L    Urea Nitrogen 38.0 (H) 8.0 - 23.0 mg/dL    Creatinine 1.60 (H) 0.67 - 1.17 mg/dL    GFR Estimate 46 (L) >60 mL/min/1.73m2    Calcium 8.7 (L) 8.8 - 10.4 mg/dL    Glucose 109 (H) 70 - 99 mg/dL   Extra Purple Top Tube    Collection Time: 05/15/25  5:56 AM   Result Value Ref Range    Hold Specimen [facility]    CBC with platelets and differential    Collection Time: 05/15/25  5:56 AM   Result Value Ref Range    WBC Count 6.2 4.0 - 11.0 10e3/uL    RBC Count 2.83 (L) 4.40 - 5.90 10e6/uL    Hemoglobin 7.8 (L) 13.3 - 17.7 g/dL    Hematocrit 24.6 (L) 40.0 - 53.0 %    MCV 87 78 - 100 fL    MCH 27.6 26.5 - 33.0 pg    MCHC 31.7 31.5 - 36.5 g/dL    RDW 15.6 (H) 10.0 - 15.0 %    Platelet Count 180 150 - 450 10e3/uL    % Neutrophils 74 %    % Lymphocytes 15 %    % Monocytes 9 %    % Eosinophils 2 %    % Basophils 0 %    %  Immature Granulocytes 0 %    NRBCs per 100 WBC 0 <1 /100    Absolute Neutrophils 4.6 1.6 - 8.3 10e3/uL    Absolute Lymphocytes 0.9 0.8 - 5.3 10e3/uL    Absolute Monocytes 0.6 0.0 - 1.3 10e3/uL    Absolute Eosinophils 0.1 0.0 - 0.7 10e3/uL    Absolute Basophils 0.0 0.0 - 0.2 10e3/uL    Absolute Immature Granulocytes 0.0 <=0.4 10e3/uL    Absolute NRBCs 0.0 10e3/uL   Glucose by meter    Collection Time: 05/15/25  7:39 AM   Result Value Ref Range    GLUCOSE BY METER POCT 104 (H) 70 - 99 mg/dL   Glucose by meter    Collection Time: 05/15/25 12:01 PM   Result Value Ref Range    GLUCOSE BY METER POCT 196 (H) 70 - 99 mg/dL   Glucose by meter    Collection Time: 05/15/25  4:59 PM   Result Value Ref Range    GLUCOSE BY METER POCT 197 (H) 70 - 99 mg/dL   Phosphorus    Collection Time: 05/15/25  7:34 PM   Result Value Ref Range    Phosphorus 2.6 2.5 - 4.5 mg/dL   Glucose by meter    Collection Time: 05/15/25  9:50 PM   Result Value Ref Range    GLUCOSE BY METER POCT 196 (H) 70 - 99 mg/dL   Glucose by meter    Collection Time: 05/16/25  2:20 AM   Result Value Ref Range    GLUCOSE BY METER POCT 222 (H) 70 - 99 mg/dL   Glucose by meter    Collection Time: 05/16/25  6:20 AM   Result Value Ref Range    GLUCOSE BY METER POCT 160 (H) 70 - 99 mg/dL   Potassium    Collection Time: 05/16/25  6:23 AM   Result Value Ref Range    Potassium 3.7 3.4 - 5.3 mmol/L   Phosphorus    Collection Time: 05/16/25  6:23 AM   Result Value Ref Range    Phosphorus 2.1 (L) 2.5 - 4.5 mg/dL   Magnesium    Collection Time: 05/16/25  6:23 AM   Result Value Ref Range    Magnesium 1.5 (L) 1.7 - 2.3 mg/dL   CBC with platelets    Collection Time: 05/16/25  6:23 AM   Result Value Ref Range    WBC Count 7.3 4.0 - 11.0 10e3/uL    RBC Count 3.02 (L) 4.40 - 5.90 10e6/uL    Hemoglobin 8.1 (L) 13.3 - 17.7 g/dL    Hematocrit 26.2 (L) 40.0 - 53.0 %    MCV 87 78 - 100 fL    MCH 26.8 26.5 - 33.0 pg    MCHC 30.9 (L) 31.5 - 36.5 g/dL    RDW 15.9 (H) 10.0 - 15.0 %    Platelet  Count 192 150 - 450 10e3/uL   Basic metabolic panel    Collection Time: 05/16/25  6:23 AM   Result Value Ref Range    Sodium 139 135 - 145 mmol/L    Potassium 3.7 3.4 - 5.3 mmol/L    Chloride 104 98 - 107 mmol/L    Carbon Dioxide (CO2) 22 22 - 29 mmol/L    Anion Gap 13 7 - 15 mmol/L    Urea Nitrogen 35.9 (H) 8.0 - 23.0 mg/dL    Creatinine 1.73 (H) 0.67 - 1.17 mg/dL    GFR Estimate 42 (L) >60 mL/min/1.73m2    Calcium 8.6 (L) 8.8 - 10.4 mg/dL    Glucose 166 (H) 70 - 99 mg/dL   Glucose by meter    Collection Time: 05/16/25  8:49 AM   Result Value Ref Range    GLUCOSE BY METER POCT 213 (H) 70 - 99 mg/dL   Glucose by meter    Collection Time: 05/16/25 12:49 PM   Result Value Ref Range    GLUCOSE BY METER POCT 214 (H) 70 - 99 mg/dL   Glucose by meter    Collection Time: 05/16/25  4:18 PM   Result Value Ref Range    GLUCOSE BY METER POCT 216 (H) 70 - 99 mg/dL   Basic metabolic panel    Collection Time: 05/17/25  5:49 AM   Result Value Ref Range    Sodium 139 135 - 145 mmol/L    Potassium 3.9 3.4 - 5.3 mmol/L    Chloride 105 98 - 107 mmol/L    Carbon Dioxide (CO2) 24 22 - 29 mmol/L    Anion Gap 10 7 - 15 mmol/L    Urea Nitrogen 29.5 (H) 8.0 - 23.0 mg/dL    Creatinine 1.63 (H) 0.67 - 1.17 mg/dL    GFR Estimate 45 (L) >60 mL/min/1.73m2    Calcium 7.9 (L) 8.8 - 10.4 mg/dL    Glucose 188 (H) 70 - 99 mg/dL   Magnesium    Collection Time: 05/17/25  5:49 AM   Result Value Ref Range    Magnesium 1.8 1.7 - 2.3 mg/dL   Phosphorus    Collection Time: 05/17/25  5:49 AM   Result Value Ref Range    Phosphorus 2.0 (L) 2.5 - 4.5 mg/dL   CBC with platelets and differential    Collection Time: 05/17/25  5:49 AM   Result Value Ref Range    WBC Count 5.9 4.0 - 11.0 10e3/uL    RBC Count 2.88 (L) 4.40 - 5.90 10e6/uL    Hemoglobin 7.8 (L) 13.3 - 17.7 g/dL    Hematocrit 24.8 (L) 40.0 - 53.0 %    MCV 86 78 - 100 fL    MCH 27.1 26.5 - 33.0 pg    MCHC 31.5 31.5 - 36.5 g/dL    RDW 15.6 (H) 10.0 - 15.0 %    Platelet Count 157 150 - 450 10e3/uL    %  Neutrophils 68 %    % Lymphocytes 21 %    % Monocytes 7 %    % Eosinophils 2 %    % Basophils 0 %    % Immature Granulocytes 1 %    NRBCs per 100 WBC 0 <1 /100    Absolute Neutrophils 4.0 1.6 - 8.3 10e3/uL    Absolute Lymphocytes 1.3 0.8 - 5.3 10e3/uL    Absolute Monocytes 0.4 0.0 - 1.3 10e3/uL    Absolute Eosinophils 0.1 0.0 - 0.7 10e3/uL    Absolute Basophils 0.0 0.0 - 0.2 10e3/uL    Absolute Immature Granulocytes 0.1 <=0.4 10e3/uL    Absolute NRBCs 0.0 10e3/uL   Ferritin    Collection Time: 05/17/25  5:49 AM   Result Value Ref Range    Ferritin 451 (H) 31 - 409 ng/mL   Iron & Iron Binding Capacity    Collection Time: 05/17/25  5:49 AM   Result Value Ref Range    Iron 48 (L) 61 - 157 ug/dL    Iron Binding Capacity 169 (L) 240 - 430 ug/dL    Iron Sat Index 28 15 - 46 %   Glucose by meter    Collection Time: 05/17/25  7:46 AM   Result Value Ref Range    GLUCOSE BY METER POCT 177 (H) 70 - 99 mg/dL   CK total    Collection Time: 06/02/25  8:17 AM   Result Value Ref Range    CK 58 39 - 308 U/L   Comprehensive metabolic panel    Collection Time: 06/02/25  8:17 AM   Result Value Ref Range    Sodium 138 135 - 145 mmol/L    Potassium 4.1 3.4 - 5.3 mmol/L    Carbon Dioxide (CO2) 24 22 - 29 mmol/L    Anion Gap 11 7 - 15 mmol/L    Urea Nitrogen 31.0 (H) 8.0 - 23.0 mg/dL    Creatinine 2.13 (H) 0.67 - 1.17 mg/dL    GFR Estimate 32 (L) >60 mL/min/1.73m2    Calcium 9.0 8.8 - 10.4 mg/dL    Chloride 103 98 - 107 mmol/L    Glucose 112 (H) 70 - 99 mg/dL    Alkaline Phosphatase 115 40 - 150 U/L    AST 19 0 - 45 U/L    ALT 8 0 - 70 U/L    Protein Total 6.3 (L) 6.4 - 8.3 g/dL    Albumin 3.7 3.5 - 5.2 g/dL    Bilirubin Total 0.2 <=1.2 mg/dL    Patient Fasting > 8hrs? Unknown    Cytomegalovirus DNA by PCR, Quantitative    Collection Time: 06/02/25  8:17 AM    Specimen: Peripheral Blood   Result Value Ref Range    CMV DNA IU/mL Not Detected Not Detected IU/mL    CMV Quantitative PCR Specimen Type Blood    Trina Barr Virus Quantitative  PCR, Plasma    Collection Time: 06/02/25  8:17 AM    Specimen: Peripheral Blood   Result Value Ref Range    EBV DNA IU/mL Not Detected Not Detected IU/mL   Phosphorus    Collection Time: 06/02/25  8:17 AM   Result Value Ref Range    Phosphorus 2.5 2.5 - 4.5 mg/dL   Magnesium    Collection Time: 06/02/25  8:17 AM   Result Value Ref Range    Magnesium 2.0 1.7 - 2.3 mg/dL   Lipid Profile    Collection Time: 06/02/25  8:17 AM   Result Value Ref Range    Cholesterol 145 <200 mg/dL    Triglycerides 519 (H) <150 mg/dL    Direct Measure HDL 31 (L) >=40 mg/dL    LDL Cholesterol Calculated      Non HDL Cholesterol 114 <130 mg/dL    Patient Fasting > 8hrs? Unknown    ImmuKnow Immune Cell Function    Collection Time: 06/02/25  8:17 AM   Result Value Ref Range    ImmuKnow Immune Cell Function 653 ng/mL   Prostate Specific Antigen Screen    Collection Time: 06/02/25  8:17 AM   Result Value Ref Range    Prostate Specific Antigen Screen 0.32 0.00 - 6.50 ng/mL   Hemoglobin A1c    Collection Time: 06/02/25  8:17 AM   Result Value Ref Range    Estimated Average Glucose 180 (H) <117 mg/dL    Hemoglobin A1C 7.9 (H) <5.7 %   CBC with platelets and differential    Collection Time: 06/02/25  8:17 AM   Result Value Ref Range    WBC Count 6.1 4.0 - 11.0 10e3/uL    RBC Count 3.21 (L) 4.40 - 5.90 10e6/uL    Hemoglobin 8.8 (L) 13.3 - 17.7 g/dL    Hematocrit 28.9 (L) 40.0 - 53.0 %    MCV 90 78 - 100 fL    MCH 27.4 26.5 - 33.0 pg    MCHC 30.4 (L) 31.5 - 36.5 g/dL    RDW 16.9 (H) 10.0 - 15.0 %    Platelet Count 301 150 - 450 10e3/uL    % Neutrophils 66 %    % Lymphocytes 21 %    % Monocytes 9 %    % Eosinophils 2 %    % Basophils 0 %    % Immature Granulocytes 1 %    NRBCs per 100 WBC 0 <1 /100    Absolute Neutrophils 4.1 1.6 - 8.3 10e3/uL    Absolute Lymphocytes 1.3 0.8 - 5.3 10e3/uL    Absolute Monocytes 0.6 0.0 - 1.3 10e3/uL    Absolute Eosinophils 0.1 0.0 - 0.7 10e3/uL    Absolute Basophils 0.0 0.0 - 0.2 10e3/uL    Absolute Immature  Granulocytes 0.1 <=0.4 10e3/uL    Absolute NRBCs 0.0 10e3/uL   HLA Donor Specific Antibody    Collection Time: 06/02/25  8:17 AM   Result Value Ref Range    Donor Identification 07/19/2020     Organ Heart     DSA Present NO     DSA Comments        Flow Single Antigen Beads assays are intended for detection/identification of IgG anti-HLA antibodies. Mfi values may not accurately quantify donor-specific antibody levels in all instances.    DSA Test Method SA EDTA FCS    HLA Jose D, CPRA    Collection Time: 06/02/25  8:17 AM   Result Value Ref Range    UNOS CPRA 0     UNACCEPTABLE ANTIGENS None >3000 mfi    HLA Jose D Class I, Single Antigen    Collection Time: 06/02/25  8:17 AM   Result Value Ref Range    SA 1 TEST METHOD SA EDTA FCS     SA 1 CELL Class I     SA1 HI RISK JOSE D None     SA1 MOD RISK JOSE D None     SA 1  COMMENTS        HLA PRA Test performed by modified testing procedure that may also include pretreatment of serum. Pretreatment may be the addition of fetal calf serum, EDTA, and/or adsorption.  High-risk, MFI > 3,000.  Mod-risk, -3,000.   HLA Jose D Class II, Single Antigen    Collection Time: 06/02/25  8:17 AM   Result Value Ref Range    SA 2 TEST METHOD SA EDTA FCS     SA 2 CELL Class II     SA2 HI RISK JOSE D None     SA2 MOD RISK JOSE D None     SA 2 COMMENTS        HLA PRA Test performed by modified testing procedure that may also include pretreatment of serum. Pretreatment may be the addition of fetal calf serum, EDTA, and/or adsorption.  High-risk, MFI > 3,000.  Mod-risk, -3,000.   EKG 12-lead, tracing only    Collection Time: 06/02/25  9:51 AM   Result Value Ref Range    Systolic Blood Pressure  mmHg    Diastolic Blood Pressure  mmHg    Ventricular Rate 80 BPM    Atrial Rate 80 BPM    DE Interval 112 ms    QRS Duration 92 ms     ms    QTc 431 ms    P Axis 4 degrees    R AXIS -15 degrees    T Axis 1 degrees    Interpretation ECG       Sinus rhythm  Incomplete right bundle branch block  Cannot  rule out Anterior infarct , age undetermined  Abnormal ECG  When compared with ECG of 09-May-2025 17:35,  QRS duration has decreased  Minimal criteria for Anterior infarct are now Present  Inverted T waves have replaced nonspecific T wave abnormality in Inferior leads  Confirmed by MD CHONG HENRI ([id number]) on 6/3/2025 9:35:51 AM     EKG 12-lead, tracing only    Collection Time: 06/02/25 11:23 AM   Result Value Ref Range    Systolic Blood Pressure  mmHg    Diastolic Blood Pressure  mmHg    Ventricular Rate 83 BPM    Atrial Rate 83 BPM    MD Interval 106 ms    QRS Duration 96 ms     ms    QTc 451 ms    P Axis 14 degrees    R AXIS -16 degrees    T Axis 10 degrees    Interpretation ECG       Sinus rhythm with short MD  Incomplete right bundle branch block  Cannot rule out Anteroseptal infarct (cited on or before 02-Jun-2025)  Abnormal ECG  When compared with ECG of 02-Jun-2025 09:51, (unconfirmed)  Questionable change in initial forces of Anteroseptal leads  Confirmed by MD CHONG HENRI ([id number]) on 6/3/2025 9:35:04 AM       Lab Results   Component Value Date    A1C 7.9 06/02/2025    A1C 7.1 05/06/2025    A1C 7.1 12/05/2024    A1C 7.3 07/22/2024    A1C 7.6 04/16/2024    A1C 6.9 01/16/2024    A1C 8.1 11/07/2023    A1C 8.7 07/17/2023    A1C 8.3 02/02/2023    A1C 6.7 01/14/2021    A1C 5.9 12/07/2020    A1C 8.2 07/03/2020    A1C 7.9 07/08/2019    A1C 8.5 03/11/2019          Assessment/Plan:  Mr. 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    #1 status post orthoptic heart transplantation     - He is doing well  -  He has normal graft function by cardiac MRI   -  MRI stress showed no evidence of allograft vasculopathy.   -  He has no prior DSA.  -  Sirolimus is at target level of 6-8.  Switched from mycophenolate 1500 twice a day to Azathioprine 50 mg daily.   -  We will continue him on aspirin and rosuvastatin.  -  Furosemide 20 mg daily for his  elevated left-sided filling pressure from his restrictive physiology.  - Repeat echo, biopsy, DSA, and Immuknow in mid to late June to make sure he is not rejecting with the IS change from MMF to AZA.     #2 toxoplasma + serostatus - We will continue him on Bactrim 3 times a week given his low renal function.    #3 type 2 diabetes mellitus - Hemoglobin A1c is better controlled. He is followed by the endocrinologist at the . He is on Jardiance, Trulicity and insulin.      #4 chronic kidney disease - overall stable.      #5 hypertension - BP elevated in the clinic but in the 130's at home. Will continue lisinopril 5 mg daily       #6 anemia - He is also being managed by the anemia of the CKD clinic. Will reach out to them to make sure that this is followed closely.     #7 Prevention - colonoscopy just completed. Encouraged him to see dermatology.     He will return to see us in a year with a DSE. He will have routine labs as per protocol in 6 months if the aboe testing comes back unremarkable. He will call us in the interim if any further worsening symptoms.      It was a pleasure meeting Mr. Oliveira in our heart transplant clinic at [facility].  We thank you for involving us in his care.    Total time today was 52 minutes reviewing notes, imaging, labs, patient visit, orders and documentation    Sincerely,  Arvin Sheridan MD   [facility]  Heart Failure, Transplant, and Mechanical Circulatory Support Cardiology   Cardiovascular Division  [facility]   [phone]        Please do not hesitate to contact me if you have any questions/concerns.     Sincerely,     Arvin Sheridan MD

## 2025-06-10 ENCOUNTER — OFFICE VISIT (OUTPATIENT)
Dept: PHARMACY | Facility: CLINIC | Age: 70
End: 2025-06-10
Attending: FAMILY MEDICINE
Payer: COMMERCIAL

## 2025-06-10 ENCOUNTER — RESULTS FOLLOW-UP (OUTPATIENT)
Dept: TRANSPLANT | Facility: CLINIC | Age: 70
End: 2025-06-10

## 2025-06-10 DIAGNOSIS — E83.42 HYPOMAGNESEMIA: ICD-10-CM

## 2025-06-10 DIAGNOSIS — K59.00 CONSTIPATION, UNSPECIFIED CONSTIPATION TYPE: ICD-10-CM

## 2025-06-10 DIAGNOSIS — Z79.4 TYPE 2 DIABETES MELLITUS WITHOUT COMPLICATION, WITH LONG-TERM CURRENT USE OF INSULIN (H): ICD-10-CM

## 2025-06-10 DIAGNOSIS — Z94.1 HEART REPLACED BY TRANSPLANT (H): Primary | ICD-10-CM

## 2025-06-10 DIAGNOSIS — N18.4 ANEMIA OF CHRONIC RENAL FAILURE, STAGE 4 (SEVERE) (H): ICD-10-CM

## 2025-06-10 DIAGNOSIS — H40.003 GLAUCOMA SUSPECT, BILATERAL: ICD-10-CM

## 2025-06-10 DIAGNOSIS — K21.00 GASTROESOPHAGEAL REFLUX DISEASE WITH ESOPHAGITIS, UNSPECIFIED WHETHER HEMORRHAGE: ICD-10-CM

## 2025-06-10 DIAGNOSIS — E83.39 HYPOPHOSPHATEMIA: ICD-10-CM

## 2025-06-10 DIAGNOSIS — I10 BENIGN ESSENTIAL HYPERTENSION: ICD-10-CM

## 2025-06-10 DIAGNOSIS — D63.1 ANEMIA OF CHRONIC RENAL FAILURE, STAGE 4 (SEVERE) (H): ICD-10-CM

## 2025-06-10 DIAGNOSIS — E78.5 DYSLIPIDEMIA: Primary | ICD-10-CM

## 2025-06-10 DIAGNOSIS — I10 ESSENTIAL HYPERTENSION: ICD-10-CM

## 2025-06-10 DIAGNOSIS — Z94.1 HEART REPLACED BY TRANSPLANT (H): ICD-10-CM

## 2025-06-10 DIAGNOSIS — E11.9 TYPE 2 DIABETES MELLITUS WITHOUT COMPLICATION, WITH LONG-TERM CURRENT USE OF INSULIN (H): ICD-10-CM

## 2025-06-10 PROCEDURE — 1111F DSCHRG MED/CURRENT MED MERGE: CPT

## 2025-06-10 PROCEDURE — 99207 PR NO CHARGE LOS: CPT

## 2025-06-10 NOTE — PROGRESS NOTES
Medication Therapy Management (MTM) Encounter    ASSESSMENT:                            Medication Adherence/Access: No issues identified.    Diabetes  Patient is not meeting A1c goal of < 7%.  Patient is not meeting goal of > 70% time in target with continuous glucose monitoring. However has made improvement in sugars since last visit with diabetes educator. Could potentially consider GLP1 agonist as an additional medication, however patient states he would prefer to remain on lower amount of medications. Will defer this communication with the diabetes educator Lucian is scheduled to meet with soon.    Hypertension   Stable. Patient is meeting blood pressure goal of < 130/80mmHg.     Hyperlipidemia   Stable. LDL is at goal however triglycerides are elevated. Is currently taking Lovaza, but would potentially benefit from increasing Rosuvastatin dose as this can also help with reduction of triglycerides. PharmD will speak with his primary care provider and discuss next steps.     GERD:  Since Lucian is not currently having any symptoms of GERD can consider stopping pantoprazole as long term use can lead to decreased bone density. PharmD educated Lucian and his wife on this point and suggested he stops pantoprazole and use the TUMS as needed. If GERD continues to be a problem can consider restarting pantoprazole.     Pain:  Continue as needed use of acetaminophen.    Heart Translplant  No current recommendations. Being managed by cardiology    Anemia:  Not at goal. Will defer to his primary care provider.    Glaucoma  No current recommendations.    Hypomagnesia  Magnesium is currently at goal. Can consider potentially stopping magnesium, will discuss with primary care provider    Hypophosphatemia:  Previous phosphorus levels are at goal. Can consider stopping phosphorus supplement, will discuss with primary care provider.    Constipation:  Continue as needed use of medication for constipation.    BPH  Continue  medication as instructed    PLAN:                            Stop pantoprazole and use Calcium Carbonate as needed  If heart burn is an issue again can restart pantoprazole  Take Jardiance and furosemide in the morning to avoid having to use the bathroom at night    Follow-up: 1 year from now or as needed      1. Suspenda el pantoprazol y use carbonato de calcio según sea necesario.  2. Si la acidez estomacal persiste, puede reiniciar el pantoprazol.  3. Morning Glory Jardiance y furosemida por la mañana para evitar ir al baño por la noche.    Seguimiento: Dentro de 1 año o según sea necesario.  Appointments in Next Year      Jun 19, 2025 3:00 PM  LAB with [facility]  [facility] ) [phone]     Jun 19, 2025 4:00 PM  Infusion 30 with  [facility] INFUSION NURSE,  [facility] ) [phone]     Jun 27, 2025 9:15 AM  (Arrive by 9:00 AM)  Diabetes Education with Bre Mi RN  [facility] Diabetes Education [facility] ) [phone]     Jun 30, 2025 2:30 PM  LAB with [facility]  [facility] [place] ([facility] ) [phone]     Jun 30, 2025 3:30 PM  Infusion 30 with [facility] INFUSION NURSE,  [facility] ) [phone]     Jul 14, 2025 2:30 PM  LAB with [facility] LAB  [facility] [place] ([facility] ) [phone]     Jul 14, 2025 3:30 PM  Infusion 30 with [facility] INFUSION NURSE, UC [facility] ) [phone]     Jul 28, 2025 2:30 PM  LAB with [facility] LAB  [facility] [place] ([facility] )  [phone]     Jul 28, 2025 3:30 PM  Infusion 30 with UC [facility] INFUSION NURSE, UC 40 [facility]  [facility] ([facility] ) [phone]     Aug 11, 2025 2:30 PM  LAB with [facility] LAB  [facility] [place] ([facility] ) [phone]     Aug 11, 2025 3:30 PM  Infusion 30 with [facility] INFUSION NURSE, UC 40 [facility]  [facility] ([facility] ) [phone]     Aug 20, 2025 8:30 AM  (Arrive by 8:15 AM)  RETURN RETINA with Triny Bunch MD  [facility] ([facility]) [phone]     Aug 25, 2025 2:30 PM  LAB with UC LAB  [facility] [place] ([facility] ) [phone]     Aug 25, 2025 3:30 PM  Infusion 30 with UC [facility] INFUSION NURSE, UC 40 [facility]  [facility] ([facility] ) [phone]            SUBJECTIVE/OBJECTIVE:                          Lucian Oliveira is a 69 year old male seen for a transitions of care visit. He was discharged from [facility] on 5/17 for mycophenolate-induced diarrhea.      Reason for visit: hospital follow-up.    Allergies/ADRs: Reviewed in chart  Past Medical History: Reviewed in chart  Tobacco: He reports that he has never smoked. He has never been exposed to tobacco smoke. He has never used smokeless tobacco.  Alcohol: not currently using    Medication Adherence/Access: no issues reported.    Diabetes   Empagliflozin 25 mg daily  CeQur Simplicity 2U injection device - use per admin instructions  Insulin degludec (Tresiba) 60 units every morning  Aspirin 81mg daily    Patient is not experiencing side effects.  Current diabetes symptoms: none  Diet/Exercise: Did not discuss     Blood sugar monitoring: Continuous Glucose Monitor Time in  "Range Last 14 Days - 45%, Above (>180mg/dl) 55%, Below (<70mg/dl) 0%  Eye exam is up to date  Foot exam: due    Hypertension   Lisinopril 5 mg daily  Furosemide 20 mg daily  Patient reports no current medication side effects  Patient self monitors blood pressure.  Home BP monitoring 110/90's.       Hyperlipidemia   Rosuvastatin 20 mg daily  Omega-3 (Lovaza) 1 g once a day    Patient reports no significant myalgias or other side effects.     GERD    Tums (calcium carbonate) 1000 mg four times daily as needed  Pantoprazole 40 mg daily  Patient feels that current regimen is effective.  The patient does not have a history of GI bleed.  Patient has not tried a trial off of therapy and is interested in doing so.       Pain   Acetaminophen 975 mg every 8 hours as needed    Rarely uses acetaminophen  Pain type/location: General pain  Patient feels that current therapy is effective.   Patient reports the following side effects: no medication side effects     Heart Translplant  Azathioprine 50 mg daily  Sirolimus 3 mg daily  Sulfamethoxazole-trimethoprim  400-80 mg 1 tablet three times weekly (Mon/Wed/Fri)    No reported side effects.     Anemia:  Ferrous sulfate 325 mg  twice daily with meals    No reported side effects    Glaucoma  Latanoprost 1 drop nightly in left eye  Ketorolac (Acular) 1 drop twice daily    No reported side effects      Hypomagnesia  Magnesium chloride 535 mg daily    No reported side effects    Hypophosphatemia:  Neutra-Phos 2 packets daily    No side effects.    Constipation:  Polyethylene glycol (GoLYTELY) as needed    No side effects. Does not use consistently.    BPH  Tamsulosin 0.4 mg every morning    No reported side effects    Today's Vitals:   BP Readings from Last 1 Encounters:   06/09/25 138/73     Pulse Readings from Last 1 Encounters:   06/09/25 86     Wt Readings from Last 1 Encounters:   06/09/25 183 lb (83 kg)     Ht Readings from Last 1 Encounters:   05/09/25 5' 6\" (1.676 m) " "    Estimated body mass index is 29.54 kg/m  as calculated from the following:    Height as of 5/9/25: 5' 6\" (1.676 m).    Weight as of 6/9/25: 183 lb (83 kg).    Temp Readings from Last 1 Encounters:   05/17/25 98.3  F (36.8  C)     ----------------  Post Discharge Medication Reconciliation Status: discharge medications reconciled and changed, per note/orders.    I spent 45 minutes with this patient today. All changes were made via collaborative practice agreement with Fracisco Casiano MD.     A summary of these recommendations was given to the patient.    Obed Ledesma, PharmD         Medication Therapy Recommendations  Gastroesophageal reflux disease with esophagitis, unspecified whether hemorrhage   1 Rationale: No medical indication at this time - Unnecessary medication therapy - Indication   Recommendation: Discontinue Medication - pantoprazole 20 MG EC tablet   Status: Accepted per CPA   Identified Date: 6/10/2025 Completed Date: 6/11/2025            "

## 2025-06-10 NOTE — PROGRESS NOTES
Argelia 10, 2025      [facility]    Mr. 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  Switched from MMF to azathioprine.    He is returning today for his 5-year annual follow-up.  He was hospitalized in May for severe diarrhea.  He had an extensive workup including colonoscopy, endoscopy and stool studies.  His colonoscopy revealed evidence of patchy colitis however the biopsy was inconclusive for mycophenolate induced colitis.  His CMV was negative.  There is no evidence of infectious etiology.  Empirically, he was switched from mycophenolate 1500 mg twice daily to azathioprine 50 mg daily.      He is home now for the last 3 weeks.  He is feeling better.  No further diarrhea.  He has been having lower extremity swelling since the hospitalization however this is getting better on low-dose furosemide.  No other symptoms.  No chest pain or chest pressure.  No shortness of breath.  No lightheadedness dizziness syncope.  Review of system otherwise unremarkable.  He is followed by endocrinology at the  and his diabetes is under reasonable control reportedly.      PAST MEDICAL HISTORY:  Past Medical History:   Diagnosis Date    CAD (coronary artery disease)     CHF (congestive heart failure) (H)     CKD (chronic kidney disease), stage III (H)     Cortical cataract of both eyes     Diabetes (H)     E. coli colitis 10/12/2023    Hyperlipidemia     Hypertension     Infection due to Streptococcus mitis group 09/23/2020    Ischemic cardiomyopathy     Norovirus 10/12/2023     Obesity     CHANTEL (obstructive sleep apnea)     occas cpap    CHANTEL (obstructive sleep apnea)- severe (AHI 30)     Osteoarthritis          CURRENT MEDICATIONS:  Current Outpatient Medications   Medication Sig Dispense Refill    azaTHIOprine (IMURAN) 50 MG tablet Take 1 tablet (50 mg) by mouth daily. 90 tablet 3    calcium carbonate-vitamin D (CALTRATE) 600-10 MG-MCG per tablet Take 1 tablet by mouth 2 times daily (with meals). 180 tablet 3    ferrous sulfate (FEROSUL) 325 (65 Fe) MG tablet Take 1 tablet (325 mg) by mouth 2 times daily (with meals). 180 tablet 3    magnesium chloride 535 (64 Mg) MG TBEC CR tablet Take 1 tablet (535 mg) by mouth daily. 90 tablet 3    pantoprazole (PROTONIX) 40 MG EC tablet Take 1 tablet (40 mg) by mouth daily. 90 tablet 3    sirolimus (GENERIC EQUIVALENT) 0.5 MG tablet Take 6 tablets (3 mg) by mouth daily. 180 tablet 11    acetaminophen (TYLENOL) 325 MG tablet Take 3 tablets (975 mg) by mouth every 8 hours as needed for mild pain 60 tablet 3    Alcohol Swabs PADS Use to swab the area of the injection or sergio as directed   Per insurance coverage 100 each 0    aspirin (ASA) 81 MG chewable tablet Take 1 tablet (81 mg) by mouth daily 120 tablet 0    bisacodyl (DULCOLAX) 5 MG EC tablet Two days prior to exam take two (2) tablets at 4pm. One day prior to exam take two (2) tablets at 4pm 4 tablet 0    blood glucose (NO BRAND SPECIFIED) test strip Use to test blood sugar 4 times daily or as directed. 400 strip 3    blood glucose monitoring (ACCU-CHEK FELIPE SMARTVIEW) meter device kit Use to test blood sugar 3-4 times daily, as directed. 1 kit 0    blood glucose monitoring (SOFTCLIX) lancets 1 each 4 times daily Use to test blood sugars 2 times daily. 400 each 8    calcium carbonate (TUMS) 500 MG chewable tablet Take 2 tablets (1,000 mg) by mouth 4 times daily as needed for heartburn. (Patient not taking: Reported on 6/10/2025) 60 tablet 0    Continuous Glucose Sensor (DEXCOM G7 SENSOR) MISC  CAMBIE CADA 10 MCINTYRE 10 each 2    empagliflozin (JARDIANCE) 25 MG TABS tablet Take 1 tablet (25 mg) by mouth daily. Solamente empiece a fabiola kerry medicamento al regresar al dieta halie lo normal. Ademas, tome kerry medicamento en el dosis de 25mg diariamente, y usted puede dejar de fabiola el dosis 10mg. 30 tablet 0    furosemide (LASIX) 20 MG tablet Take 1 tablet (20 mg) by mouth daily. 90 tablet 3    HUMALOG KWIKPEN 100 UNIT/ML soln Give 10 units with breakfast, 10 units with lunch,  and 5 units with dinner.  Average daily use is 25 units 90 mL 3    Injection Device for insulin (CEQUR SIMPLICITY 2U) KALI 1 each See Admin Instructions. Change patch every 2-3 days 40 each 4    Injection Device for Insulin (CEQUR SIMPLICITY ) MISC 1 each See Admin Instructions. Use with patches. Change patch every 2-3 days 1 each 5    insulin degludec (TRESIBA) 200 UNIT/ML pen Inject 60 Units Subcutaneous every morning Order pen needles too 90 mL 11    insulin pen needle (32G X 4 MM) 32G X 4 MM miscellaneous Use 5-6 pen needles daily or as directed. 600 each 2    ketorolac (ACULAR) 0.5 % ophthalmic solution Place 1 drop into the right eye 2 times daily 10 mL 3    latanoprost (XALATAN) 0.005 % ophthalmic solution Place 1 drop into both eyes daily. 5 mL 11    lisinopril (ZESTRIL) 5 MG tablet Take 1 tablet (5 mg) by mouth daily. 100 tablet 0    omega-3 acid ethyl esters (LOVAZA) 1 g capsule TOME 1 CAPSULA POR LA BOCA CADA LISA 30 capsule 11    polyethylene glycol (GOLYTELY) 236 g suspension Two days before procedure at 5PM fill first container with water. Mix and drink an 8 oz glass every 15 minutes until HALF of the container is gone. Place the remainder in the refrigerator. One day before procedure at 5PM drink second half of bowel prep. Drink an 8 oz glass every 15 minutes until it is gone. Day of procedure 6 hours before arrival time fill the 2nd container with water. Mix and drink an 8 oz glass every 15 minutes until HALF of the  container is gone. Discard the remaining solution. 8000 mL 0    potassium & sodium phosphates (NEUTRA-PHOS) 280-160-250 MG Packet Take 2 packets by mouth daily. 100 packet 0    rosuvastatin (CRESTOR) 20 MG tablet Take 1 tablet (20 mg) by mouth daily. 100 tablet 0    sulfamethoxazole-trimethoprim (BACTRIM) 400-80 MG tablet Take 1 tablet by mouth three times a week. 45 tablet 3    tamsulosin (FLOMAX) 0.4 MG capsule Take 1 capsule (0.4 mg) by mouth every morning. 90 capsule 3     Current Facility-Administered Medications   Medication Dose Route Frequency Provider Last Rate Last Admin    bevacizumab (AVASTIN) intravitreal inj 1.25 mg  1.25 mg Intravitreal Q28 Days Triny Bunch MD   1.25 mg at 05/07/25 1226       EXAM:  /73 (BP Location: Left arm, Patient Position: Chair, Cuff Size: Adult Regular)   Pulse 86   Wt 83 kg (183 lb)   SpO2 100%   BMI 29.54 kg/m    He was awake, alert, oriented x3.  He was comfortable.  He was in no apparent distress.  He had no pallor, cyanosis or jaundice.  His neck exam revealed no jugular venous distention.  His carotids were 2+ bilaterally.  His pulse was regular and rhythm.  Cardiac auscultation revealed normal S1 and S2 with no murmur rub or gallop.  Auscultation of the lungs revealed equal air entry on both sides.  His abdomen was soft with normal bowels with no tenderness no rigidity no guarding.  He had no focal neurological deficit.  His extremities showed 2+ edema bilaterally    Testing:    Cardiac MRI Stress (06/2025):     1. Surgical changes s/p heart transplant. The LV is normal in cavity size and wall thickness. The global  systolic function is normal. The LVEF is 63%. There are no regional wall motion abnormalities.     2. The RV is normal in cavity size. The global systolic function is normal. The RVEF is 58%.      3. Left atrium is large consistent with heart transplant.     4. There is no significant valvular disease.      5. Late gadolinium  enhancement imaging shows small focal midmyocardial and basal midwall and epicardial LGE  in the inferoseptal segments and epicardial midmyocardial anterior segment.      6. Regadenoson stress perfusion imaging shows no ischemia.     7. There is no pericardial effusion or thickening.     8.  There is no intracardiac thrombus.     CONCLUSIONS: s/p cardiac transplant. Normal LV (LVEF 63%) and RV (RVEF 58%) functions. No evidence of  perfusion defects on Regadenoson stress imaging. Small mesocardial and epicardial foci of LGE in the  midventricular and basal inferoseptal and midventricular anterior segments in a non-ischemic pattern.      Compared to the CMR from 09/26/2022, biventricular function is unchanged; non-ischemic LGE pattern and  severity are unchanged.     Coronary angiogram (07/2023):  Left Main   The vessel is large. Eccentric plaque 0.3-0.5 mm.      Left Anterior Descending   The vessel is large.      First Diagonal Branch   The vessel is small and is angiographically normal.      Second Diagonal Branch   The vessel is small and is angiographically normal.      Left Circumflex   The vessel is moderate in size.      First Obtuse Marginal Branch   The vessel is moderate in size and is angiographically normal.      Second Obtuse Marginal Branch   The vessel is moderate in size and is angiographically normal.      Right Coronary Artery   The vessel is moderate in size.      Right Posterior Descending Artery   The vessel is moderate in size and is angiographically normal.      Right Posterior Atrioventricular Artery   The vessel is moderate in size and is angiographically normal.      First Right Posterolateral Branch   The vessel is small and is angiographically normal.      Second Right Posterolateral Branch   The vessel is small and is angiographically normal.         Intervention        Recent Results (from the past 4 weeks)   Glucose by meter    Collection Time: 05/13/25 12:42 PM   Result Value Ref Range     GLUCOSE BY METER POCT 136 (H) 70 - 99 mg/dL   Microsporidia stain    Collection Time: 05/13/25  2:11 PM    Specimen: Per Rectum; Stool   Result Value Ref Range    Microsporidia Stain Negative Negative   Routine parasitology exam    Collection Time: 05/13/25  2:11 PM    Specimen: Per Rectum; Stool   Result Value Ref Range    OVA AND PARASITE EXAM Negative Negative    WBC'S, O&P 2+ PMNs     Parasitology Exam Specimen Type Stool    Glucose by meter    Collection Time: 05/13/25  5:03 PM   Result Value Ref Range    GLUCOSE BY METER POCT 194 (H) 70 - 99 mg/dL   Glucose by meter    Collection Time: 05/13/25  8:12 PM   Result Value Ref Range    GLUCOSE BY METER POCT 115 (H) 70 - 99 mg/dL   Glucose by meter    Collection Time: 05/13/25  9:31 PM   Result Value Ref Range    GLUCOSE BY METER POCT 121 (H) 70 - 99 mg/dL   Potassium    Collection Time: 05/13/25  9:52 PM   Result Value Ref Range    Potassium 3.3 (L) 3.4 - 5.3 mmol/L   Glucose by meter    Collection Time: 05/14/25 12:22 AM   Result Value Ref Range    GLUCOSE BY METER POCT 124 (H) 70 - 99 mg/dL   Comprehensive metabolic panel    Collection Time: 05/14/25  3:55 AM   Result Value Ref Range    Sodium 139 135 - 145 mmol/L    Potassium 3.3 (L) 3.4 - 5.3 mmol/L    Carbon Dioxide (CO2) 31 (H) 22 - 29 mmol/L    Anion Gap 11 7 - 15 mmol/L    Urea Nitrogen 63.0 (H) 8.0 - 23.0 mg/dL    Creatinine 1.90 (H) 0.67 - 1.17 mg/dL    GFR Estimate 37 (L) >60 mL/min/1.73m2    Calcium 9.0 8.8 - 10.4 mg/dL    Chloride 97 (L) 98 - 107 mmol/L    Glucose 121 (H) 70 - 99 mg/dL    Alkaline Phosphatase 116 40 - 150 U/L    AST 22 0 - 45 U/L    ALT 10 0 - 70 U/L    Protein Total 6.3 (L) 6.4 - 8.3 g/dL    Albumin 3.9 3.5 - 5.2 g/dL    Bilirubin Total 0.3 <=1.2 mg/dL   Magnesium    Collection Time: 05/14/25  3:55 AM   Result Value Ref Range    Magnesium 1.6 (L) 1.7 - 2.3 mg/dL   Phosphorus    Collection Time: 05/14/25  3:55 AM   Result Value Ref Range    Phosphorus 1.4 (L) 2.5 - 4.5 mg/dL   INR     Collection Time: 05/14/25  3:55 AM   Result Value Ref Range    INR 1.13 0.85 - 1.15    PT 14.5 11.8 - 14.8 Seconds   Potassium    Collection Time: 05/14/25  3:55 AM   Result Value Ref Range    Potassium 3.3 (L) 3.4 - 5.3 mmol/L   CBC with platelets and differential    Collection Time: 05/14/25  3:55 AM   Result Value Ref Range    WBC Count 7.4 4.0 - 11.0 10e3/uL    RBC Count 3.10 (L) 4.40 - 5.90 10e6/uL    Hemoglobin 8.6 (L) 13.3 - 17.7 g/dL    Hematocrit 26.3 (L) 40.0 - 53.0 %    MCV 85 78 - 100 fL    MCH 27.7 26.5 - 33.0 pg    MCHC 32.7 31.5 - 36.5 g/dL    RDW 15.6 (H) 10.0 - 15.0 %    Platelet Count 201 150 - 450 10e3/uL    % Neutrophils 73 %    % Lymphocytes 16 %    % Monocytes 9 %    % Eosinophils 1 %    % Basophils 0 %    % Immature Granulocytes 0 %    NRBCs per 100 WBC 0 <1 /100    Absolute Neutrophils 5.4 1.6 - 8.3 10e3/uL    Absolute Lymphocytes 1.2 0.8 - 5.3 10e3/uL    Absolute Monocytes 0.7 0.0 - 1.3 10e3/uL    Absolute Eosinophils 0.1 0.0 - 0.7 10e3/uL    Absolute Basophils 0.0 0.0 - 0.2 10e3/uL    Absolute Immature Granulocytes 0.0 <=0.4 10e3/uL    Absolute NRBCs 0.0 10e3/uL   Glucose by meter    Collection Time: 05/14/25  4:08 AM   Result Value Ref Range    GLUCOSE BY METER POCT 118 (H) 70 - 99 mg/dL   COLONOSCOPY    Collection Time: 05/14/25  7:28 AM   Result Value Ref Range    COLONOSCOPY       [address] [place] [address] [phone]     Endoscopy Department  _______________________________________________________________________________  Patient Name: Lucian Oliveira           Procedure Date: 5/14/2025 7:28 AM  MRN: [id number]                       Account Number: [id number]  YOB: 1955             Admit Type: Inpatient  Age: 69                               Room: [place] [address]  Gender: Male                          Note Status: Finalized  Attending MD: AMARIS SARABIA MD,   Total Sedation Time:    _______________________________________________________________________________     Procedure:             Colonoscopy  Indications:           Chronic diarrhea  Providers:             AMARIS SARABIA MD, Mehrdad Gonzales RN, Arlen Allan RN, ARA LOPEZ  Patient Profile:       69 y/o male with a history of orthotopic heart                          transplant 7/19/2020 on MMF who is he re for an EGD for                          weight loss and colonoscopy for diarrhea  Referring MD:            Medicines:             Midazolam 2 mg IV, Fentanyl 25 micrograms IV  Complications:         No immediate complications.  _______________________________________________________________________________  Procedure:             Pre-Anesthesia Assessment:                         - Prior to the procedure, a History and Physical was                          performed, and patient medications and allergies were                          reviewed. The patient is competent. The risks and                          benefits of the procedure and the sedation options and                          risks were discussed with the patient. All questions                          were answered and informed consent was obtained.                          Patient identification and proposed procedure were                          verified by the physician and the nurse in the                          [facility] room. Mental Status Examination: alert and                          oriented. Airway Examination: normal oropharyngeal                          airway and neck mobility. Respiratory Examination:                          clear to auscultation. CV Examination: normal.                          Prophylactic Antibiotics: The patient does not require                          prophylactic antibiotics. Prior Anticoagulants: The                          patient has taken no anticoagulant or antiplatelet                           agents. ASA Grade Assessment: II - A patient with mild                          systemic disease. After reviewing the risks and                          benefits, the patient was deemed in satisfactory                          condition to undergo the procedure. The anesthesia                          plan was to use moderate sedation / analgesia                          (conscious sedation). Immediately prior to                          administration of medicat ions, the patient was                          re-assessed for adequacy to receive sedatives. The                          heart rate, respiratory rate, oxygen saturations,                          blood pressure, adequacy of pulmonary ventilation, and                          response to care were monitored throughout the                          procedure. The physical status of the patient was                          re-assessed after the procedure.                         After obtaining informed consent, the colonoscope was                          passed under direct vision. Throughout the procedure,                          the patient's blood pressure, pulse, and oxygen                          saturations were monitored continuously. The Olympus                          Adult Colonoscope was introduced through the anus and                          advanced to the terminal ileum. The colonoscopy was                          performed without difficulty. The patient tolerated                           the procedure well. The quality of the bowel                          preparation was fair.                                                                                   Findings:       The perianal and digital rectal examinations were normal.       A moderate amount of stool was found in the entire colon, interfering        with visualization.       Normal mucosa was found in the entire colon. Biopsies were taken with a        cold forceps  for histology.       The terminal ileum contained a single localized non-bleeding erosion. No        stigmata of recent bleeding were seen. Biopsies were taken with a cold        forceps for histology.                                                                                   Moderate Sedation:       Moderate (conscious) sedation was administered by the nurse and        supervised by the endoscopist. The following parameters were monitored:        oxygen saturation, heart rate, blood pressure, and response to care .        Total physician intraservice time was 34 minutes.  Impression:            - Preparation of the colon was fair.                         - Stool in the entire examined colon.                         - Normal mucosa in the entire examined colon. Biopsied.                         - A single erosion in the terminal ileum. Biopsied.  Recommendation:        - [facility] to [facility] for [facility] care.                         - Await pathology results                                                                                     Signed Electronically by Amaris Aldrich MD  _____________________  AMARIS ALDRICH MD  5/27/2025 1:36:26 PM  I was physically present for the entire viewing portion of the exam.  __________________________  Signature of teaching physician  [email or web address]/Codey ALDRICH MD    ______________  ARA LOPEZ,   Number of Addenda: 0    Note Initiated On: 5/14/2025 7:28 AM  Scope In: 1:11:38 PM  Scope Out: 1:43:23 PM     Potassium    Collection Time: 05/14/25 10:44 AM   Result Value Ref Range    Potassium 4.2 3.4 - 5.3 mmol/L   UPPER GI ENDOSCOPY    Collection Time: 05/14/25 11:50 AM   Result Value Ref Range    Upper GI Endoscopy       [address] [place] [address] [phone]     Endoscopy Department  _______________________________________________________________________________  Patient Name: Lucian Oliveira            Procedure Date: 5/14/2025 11:50 AM  MRN: [id number]                       Account Number: [id number]  YOB: 1955             Admit Type: Inpatient  Age: 69                               Room: [place] [address]  Gender: Male                          Note Status: Finalized  Attending MD: AMARIS SARABIA MD,   Total Sedation Time:   _______________________________________________________________________________     Procedure:             Upper GI endoscopy  Indications:           weight loss  Providers:             AMARIS SARABIA MD, Mehrdad Gonzales RN, Arlen Allan RN, ARA LOPEZ  Patient Profile:       69 y/o male with a history of orthotopic heart                          transplant 7/19/2020 on MMF who is  here for an EGD for                          weight loss and colonoscopy for diarrhea  Referring MD:          BRENDA DAI  Medicines:             Midazolam 3 mg IV, Fentanyl 100 micrograms IV  Complications:         No immediate complications.  _______________________________________________________________________________  Procedure:             Pre-Anesthesia Assessment:                         - Prior to the procedure, a History and Physical was                          performed, and patient medications and allergies were                          reviewed. The patient is competent. The risks and                          benefits of the procedure and the sedation options and                          risks were discussed with the patient. All questions                          were answered and informed consent was obtained.                          Patient identification and proposed procedure were                          verified by the physician and the nurse in the                           procedure room. Mental Status Examination: alert and                          oriented. Airway Examination: normal oropharyngeal                          airway and  neck mobility. Respiratory Examination:                          clear to auscultation. CV Examination: normal.                          Prophylactic Antibiotics: The patient does not require                          prophylactic antibiotics. Prior Anticoagulants: The                          patient has taken no anticoagulant or antiplatelet                          agents. ASA Grade Assessment: II - A patient with mild                          systemic disease. After reviewing the risks and                          benefits, the patient was deemed in satisfactory                          condition to undergo the procedure. The anesthesia                          plan was to use moderate sedation / analgesia                          (conscious sedation). Immediately prior to                          adminis tration of medications, the patient was                          re-assessed for adequacy to receive sedatives. The                          heart rate, respiratory rate, oxygen saturations,                          blood pressure, adequacy of pulmonary ventilation, and                          response to care were monitored throughout the                          procedure. The physical status of the patient was                          re-assessed after the procedure.                         After obtaining informed consent, the endoscope was                          passed under direct vision. Throughout the procedure,                          the patient's blood pressure, pulse, and oxygen                          saturations were monitored continuously. The Endoscope                          was introduced through the mouth, and advanced to the                          second part of duodenum. The upper GI endoscopy was                          accomplished without difficulty. Th e patient tolerated                          the procedure well.                                                                                    Findings:       The examined esophagus was normal.       Bits of old hematin (altered blood/coffee-ground-like material) was        found in the stomach.       Mild gastropathy was noted in the antrum. A jet of water elicited faint        bleeding, which explains the bits of hematin. Biopsies were taken with a        cold forceps for histology.       Normal mucosa was found in the entire duodenum. Biopsies were taken with        a cold forceps for histology.                                                                                   Moderate Sedation:       Moderate (conscious) sedation was administered by the nurse and        supervised by the endoscopist. The following parameters were monitored:        oxygen saturation, heart rate, blood pressure, and response to care.        Total physician intraservice time was 17 minutes.  Impres maddy:            - Normal esophagus.                         - Mild gastropathy in the antrum.                         - Normal mucosa was found in the entire examined                          duodenum. Biopsied for  Recommendation:        - Await pathology results.                         - Recommend omeprazole 40 mg daily                                                                                     Signed Electronically by Amaris Sarabia MD  _____________________  AMARIS SARABIA MD  5/27/2025 1:35:39 PM  I was physically present for the entire viewing portion of the exam.  __________________________  Signature of teaching physician  [id number] SARABIA MD    ______________  ARA LOPEZ,   Number of Addenda: 0    Note Initiated On: 5/14/2025 11:50 AM  Scope In:  Scope Out:     Surgical Pathology Exam    Collection Time: 05/14/25 12:59 PM   Result Value Ref Range    Case Report       Surgical Pathology Report                         Case: [id number]                                  Authorizing Provider:  Amaris Sarabia MD      Collected:           05/14/2025 12:59 PM          Ordering Location:     [facility]          Received:            05/14/2025 01:53 PM                                 Endoscopy                                                                    Pathologist:           Kvng Sawyer DO                                                            Specimens:   A) - Small Intestine, Duodenum, 2nd portion                                                         B) - Stomach, Gastric biopsy                                                                        C) - Small Intestine, Terminal Ileum, Terminal ileum biopsy                                         D) - Large Intestine, Colon, Random Colon                                                  Addendum       CMV immunohistochemical stains performed on parts C. and D. are negative  Analyte Specific Reagents (ASRs) are used in many laboratory tests necessary for standard medical care and generally do not require FDA approval. This test was developed and its performance characteristics determined by [facility]. It has not been cleared or approved by the U.S. Food and Drug Administration.  [facility] are certified for the performance of high-complexity clinical testing under the Clinical Laboratory Improvement Amendments of 1988 (CLIA), and in keeping with the certification requirements, the laboratory has verified this test's accuracy, precision and/or validity of the method.        Final Diagnosis       A. 2ND PORTION:  - Unremarkable duodenal mucosa  - No evidence of celiac disease    B. GASTRIC BIOPSY:  - Mild reactive gastropathy  - No H. pylori-like organisms identified on routine staining  - No intestinal metaplasia identified    C. TERMINAL ILEUM BIOPSY:  - Ileal mucosa with mild architectural changes; see comment    D. RANDOM COLON:  - Focal active colitis; see comment        Comment       Sections of  "terminal ileum and random colon show focal neutrophilic cryptitis, mild architectural changes, and rare crypt apoptosis that are nondiagnostic of MMF toxicity.  CMV immunohistochemical stains are pending.      Clinical Information       Diarrhea      Gross Description       A(1). Small Intestine, Duodenum, 2nd portion:  The specimen is received in formalin with proper patient identification labeled \" duodenum second portion\".  The specimen consists of 5 tan pieces of soft tissue, ranging from 0.2-0.3 cm in greatest dimension.  Entirely submitted in cassette A1.    B(2). Stomach, Gastric biopsy:  The specimen is received in formalin with proper patient identification labeled \" gastric biopsy\".  The specimen consists of 4 tan pieces of soft tissue, ranging from 0.1-0.3 cm in greatest dimension.  Entirely submitted in cassette B1.    C(3). Small Intestine, Terminal Ileum, Terminal ileum biopsy:  The specimen is received in formalin with proper patient identification labeled \" terminal ileum biopsy\".  The specimen consists of a single tan piece of soft tissue, measuring 0.4 cm in greatest dimension.  Entirely submitted in cassette C1.    D(4). Large Intestine, Colon, Random Colon:  The specimen is received in formalin with proper patient identification labeled \" random colon\".  The specimen consists of 8 tan pieces of soft tissue, ranging from 0.2-0.3 cm in greatest dimension.  Entirely submitted in cassette D1.        Performing Labs       The technical component of this testing was completed at [facility].    Stain controls for all stains resulted within this report have been reviewed and show appropriate reactivity.       Case Images     Potassium    Collection Time: 05/14/25  2:16 PM   Result Value Ref Range    Potassium 4.1 3.4 - 5.3 mmol/L   Glucose by meter    Collection Time: 05/14/25  4:20 PM   Result Value Ref Range    GLUCOSE BY METER POCT 93 70 - " 99 mg/dL   Phosphorus    Collection Time: 05/14/25  8:24 PM   Result Value Ref Range    Phosphorus 1.7 (L) 2.5 - 4.5 mg/dL   Glucose by meter    Collection Time: 05/14/25  9:40 PM   Result Value Ref Range    GLUCOSE BY METER POCT 185 (H) 70 - 99 mg/dL   Magnesium    Collection Time: 05/15/25  5:56 AM   Result Value Ref Range    Magnesium 2.0 1.7 - 2.3 mg/dL   Phosphorus    Collection Time: 05/15/25  5:56 AM   Result Value Ref Range    Phosphorus 1.8 (L) 2.5 - 4.5 mg/dL   Basic metabolic panel    Collection Time: 05/15/25  5:56 AM   Result Value Ref Range    Sodium 142 135 - 145 mmol/L    Potassium 4.0 3.4 - 5.3 mmol/L    Chloride 104 98 - 107 mmol/L    Carbon Dioxide (CO2) 29 22 - 29 mmol/L    Anion Gap 9 7 - 15 mmol/L    Urea Nitrogen 38.0 (H) 8.0 - 23.0 mg/dL    Creatinine 1.60 (H) 0.67 - 1.17 mg/dL    GFR Estimate 46 (L) >60 mL/min/1.73m2    Calcium 8.7 (L) 8.8 - 10.4 mg/dL    Glucose 109 (H) 70 - 99 mg/dL   Extra Purple Top Tube    Collection Time: 05/15/25  5:56 AM   Result Value Ref Range    Hold Specimen JI    CBC with platelets and differential    Collection Time: 05/15/25  5:56 AM   Result Value Ref Range    WBC Count 6.2 4.0 - 11.0 10e3/uL    RBC Count 2.83 (L) 4.40 - 5.90 10e6/uL    Hemoglobin 7.8 (L) 13.3 - 17.7 g/dL    Hematocrit 24.6 (L) 40.0 - 53.0 %    MCV 87 78 - 100 fL    MCH 27.6 26.5 - 33.0 pg    MCHC 31.7 31.5 - 36.5 g/dL    RDW 15.6 (H) 10.0 - 15.0 %    Platelet Count 180 150 - 450 10e3/uL    % Neutrophils 74 %    % Lymphocytes 15 %    % Monocytes 9 %    % Eosinophils 2 %    % Basophils 0 %    % Immature Granulocytes 0 %    NRBCs per 100 WBC 0 <1 /100    Absolute Neutrophils 4.6 1.6 - 8.3 10e3/uL    Absolute Lymphocytes 0.9 0.8 - 5.3 10e3/uL    Absolute Monocytes 0.6 0.0 - 1.3 10e3/uL    Absolute Eosinophils 0.1 0.0 - 0.7 10e3/uL    Absolute Basophils 0.0 0.0 - 0.2 10e3/uL    Absolute Immature Granulocytes 0.0 <=0.4 10e3/uL    Absolute NRBCs 0.0 10e3/uL   Glucose by meter    Collection Time:  05/15/25  7:39 AM   Result Value Ref Range    GLUCOSE BY METER POCT 104 (H) 70 - 99 mg/dL   Glucose by meter    Collection Time: 05/15/25 12:01 PM   Result Value Ref Range    GLUCOSE BY METER POCT 196 (H) 70 - 99 mg/dL   Glucose by meter    Collection Time: 05/15/25  4:59 PM   Result Value Ref Range    GLUCOSE BY METER POCT 197 (H) 70 - 99 mg/dL   Phosphorus    Collection Time: 05/15/25  7:34 PM   Result Value Ref Range    Phosphorus 2.6 2.5 - 4.5 mg/dL   Glucose by meter    Collection Time: 05/15/25  9:50 PM   Result Value Ref Range    GLUCOSE BY METER POCT 196 (H) 70 - 99 mg/dL   Glucose by meter    Collection Time: 05/16/25  2:20 AM   Result Value Ref Range    GLUCOSE BY METER POCT 222 (H) 70 - 99 mg/dL   Glucose by meter    Collection Time: 05/16/25  6:20 AM   Result Value Ref Range    GLUCOSE BY METER POCT 160 (H) 70 - 99 mg/dL   Potassium    Collection Time: 05/16/25  6:23 AM   Result Value Ref Range    Potassium 3.7 3.4 - 5.3 mmol/L   Phosphorus    Collection Time: 05/16/25  6:23 AM   Result Value Ref Range    Phosphorus 2.1 (L) 2.5 - 4.5 mg/dL   Magnesium    Collection Time: 05/16/25  6:23 AM   Result Value Ref Range    Magnesium 1.5 (L) 1.7 - 2.3 mg/dL   CBC with platelets    Collection Time: 05/16/25  6:23 AM   Result Value Ref Range    WBC Count 7.3 4.0 - 11.0 10e3/uL    RBC Count 3.02 (L) 4.40 - 5.90 10e6/uL    Hemoglobin 8.1 (L) 13.3 - 17.7 g/dL    Hematocrit 26.2 (L) 40.0 - 53.0 %    MCV 87 78 - 100 fL    MCH 26.8 26.5 - 33.0 pg    MCHC 30.9 (L) 31.5 - 36.5 g/dL    RDW 15.9 (H) 10.0 - 15.0 %    Platelet Count 192 150 - 450 10e3/uL   Basic metabolic panel    Collection Time: 05/16/25  6:23 AM   Result Value Ref Range    Sodium 139 135 - 145 mmol/L    Potassium 3.7 3.4 - 5.3 mmol/L    Chloride 104 98 - 107 mmol/L    Carbon Dioxide (CO2) 22 22 - 29 mmol/L    Anion Gap 13 7 - 15 mmol/L    Urea Nitrogen 35.9 (H) 8.0 - 23.0 mg/dL    Creatinine 1.73 (H) 0.67 - 1.17 mg/dL    GFR Estimate 42 (L) >60  mL/min/1.73m2    Calcium 8.6 (L) 8.8 - 10.4 mg/dL    Glucose 166 (H) 70 - 99 mg/dL   Glucose by meter    Collection Time: 05/16/25  8:49 AM   Result Value Ref Range    GLUCOSE BY METER POCT 213 (H) 70 - 99 mg/dL   Glucose by meter    Collection Time: 05/16/25 12:49 PM   Result Value Ref Range    GLUCOSE BY METER POCT 214 (H) 70 - 99 mg/dL   Glucose by meter    Collection Time: 05/16/25  4:18 PM   Result Value Ref Range    GLUCOSE BY METER POCT 216 (H) 70 - 99 mg/dL   Basic metabolic panel    Collection Time: 05/17/25  5:49 AM   Result Value Ref Range    Sodium 139 135 - 145 mmol/L    Potassium 3.9 3.4 - 5.3 mmol/L    Chloride 105 98 - 107 mmol/L    Carbon Dioxide (CO2) 24 22 - 29 mmol/L    Anion Gap 10 7 - 15 mmol/L    Urea Nitrogen 29.5 (H) 8.0 - 23.0 mg/dL    Creatinine 1.63 (H) 0.67 - 1.17 mg/dL    GFR Estimate 45 (L) >60 mL/min/1.73m2    Calcium 7.9 (L) 8.8 - 10.4 mg/dL    Glucose 188 (H) 70 - 99 mg/dL   Magnesium    Collection Time: 05/17/25  5:49 AM   Result Value Ref Range    Magnesium 1.8 1.7 - 2.3 mg/dL   Phosphorus    Collection Time: 05/17/25  5:49 AM   Result Value Ref Range    Phosphorus 2.0 (L) 2.5 - 4.5 mg/dL   CBC with platelets and differential    Collection Time: 05/17/25  5:49 AM   Result Value Ref Range    WBC Count 5.9 4.0 - 11.0 10e3/uL    RBC Count 2.88 (L) 4.40 - 5.90 10e6/uL    Hemoglobin 7.8 (L) 13.3 - 17.7 g/dL    Hematocrit 24.8 (L) 40.0 - 53.0 %    MCV 86 78 - 100 fL    MCH 27.1 26.5 - 33.0 pg    MCHC 31.5 31.5 - 36.5 g/dL    RDW 15.6 (H) 10.0 - 15.0 %    Platelet Count 157 150 - 450 10e3/uL    % Neutrophils 68 %    % Lymphocytes 21 %    % Monocytes 7 %    % Eosinophils 2 %    % Basophils 0 %    % Immature Granulocytes 1 %    NRBCs per 100 WBC 0 <1 /100    Absolute Neutrophils 4.0 1.6 - 8.3 10e3/uL    Absolute Lymphocytes 1.3 0.8 - 5.3 10e3/uL    Absolute Monocytes 0.4 0.0 - 1.3 10e3/uL    Absolute Eosinophils 0.1 0.0 - 0.7 10e3/uL    Absolute Basophils 0.0 0.0 - 0.2 10e3/uL    Absolute  Immature Granulocytes 0.1 <=0.4 10e3/uL    Absolute NRBCs 0.0 10e3/uL   Ferritin    Collection Time: 05/17/25  5:49 AM   Result Value Ref Range    Ferritin 451 (H) 31 - 409 ng/mL   Iron & Iron Binding Capacity    Collection Time: 05/17/25  5:49 AM   Result Value Ref Range    Iron 48 (L) 61 - 157 ug/dL    Iron Binding Capacity 169 (L) 240 - 430 ug/dL    Iron Sat Index 28 15 - 46 %   Glucose by meter    Collection Time: 05/17/25  7:46 AM   Result Value Ref Range    GLUCOSE BY METER POCT 177 (H) 70 - 99 mg/dL   CK total    Collection Time: 06/02/25  8:17 AM   Result Value Ref Range    CK 58 39 - 308 U/L   Comprehensive metabolic panel    Collection Time: 06/02/25  8:17 AM   Result Value Ref Range    Sodium 138 135 - 145 mmol/L    Potassium 4.1 3.4 - 5.3 mmol/L    Carbon Dioxide (CO2) 24 22 - 29 mmol/L    Anion Gap 11 7 - 15 mmol/L    Urea Nitrogen 31.0 (H) 8.0 - 23.0 mg/dL    Creatinine 2.13 (H) 0.67 - 1.17 mg/dL    GFR Estimate 32 (L) >60 mL/min/1.73m2    Calcium 9.0 8.8 - 10.4 mg/dL    Chloride 103 98 - 107 mmol/L    Glucose 112 (H) 70 - 99 mg/dL    Alkaline Phosphatase 115 40 - 150 U/L    AST 19 0 - 45 U/L    ALT 8 0 - 70 U/L    Protein Total 6.3 (L) 6.4 - 8.3 g/dL    Albumin 3.7 3.5 - 5.2 g/dL    Bilirubin Total 0.2 <=1.2 mg/dL    Patient Fasting > 8hrs? Unknown    Cytomegalovirus DNA by PCR, Quantitative    Collection Time: 06/02/25  8:17 AM    Specimen: Peripheral Blood   Result Value Ref Range    CMV DNA IU/mL Not Detected Not Detected IU/mL    CMV Quantitative PCR Specimen Type Blood    Trina Barr Virus Quantitative PCR, Plasma    Collection Time: 06/02/25  8:17 AM    Specimen: Peripheral Blood   Result Value Ref Range    EBV DNA IU/mL Not Detected Not Detected IU/mL   Phosphorus    Collection Time: 06/02/25  8:17 AM   Result Value Ref Range    Phosphorus 2.5 2.5 - 4.5 mg/dL   Magnesium    Collection Time: 06/02/25  8:17 AM   Result Value Ref Range    Magnesium 2.0 1.7 - 2.3 mg/dL   Lipid Profile     Collection Time: 06/02/25  8:17 AM   Result Value Ref Range    Cholesterol 145 <200 mg/dL    Triglycerides 519 (H) <150 mg/dL    Direct Measure HDL 31 (L) >=40 mg/dL    LDL Cholesterol Calculated      Non HDL Cholesterol 114 <130 mg/dL    Patient Fasting > 8hrs? Unknown    ImmuKnow Immune Cell Function    Collection Time: 06/02/25  8:17 AM   Result Value Ref Range    ImmuKnow Immune Cell Function 653 ng/mL   Prostate Specific Antigen Screen    Collection Time: 06/02/25  8:17 AM   Result Value Ref Range    Prostate Specific Antigen Screen 0.32 0.00 - 6.50 ng/mL   Hemoglobin A1c    Collection Time: 06/02/25  8:17 AM   Result Value Ref Range    Estimated Average Glucose 180 (H) <117 mg/dL    Hemoglobin A1C 7.9 (H) <5.7 %   CBC with platelets and differential    Collection Time: 06/02/25  8:17 AM   Result Value Ref Range    WBC Count 6.1 4.0 - 11.0 10e3/uL    RBC Count 3.21 (L) 4.40 - 5.90 10e6/uL    Hemoglobin 8.8 (L) 13.3 - 17.7 g/dL    Hematocrit 28.9 (L) 40.0 - 53.0 %    MCV 90 78 - 100 fL    MCH 27.4 26.5 - 33.0 pg    MCHC 30.4 (L) 31.5 - 36.5 g/dL    RDW 16.9 (H) 10.0 - 15.0 %    Platelet Count 301 150 - 450 10e3/uL    % Neutrophils 66 %    % Lymphocytes 21 %    % Monocytes 9 %    % Eosinophils 2 %    % Basophils 0 %    % Immature Granulocytes 1 %    NRBCs per 100 WBC 0 <1 /100    Absolute Neutrophils 4.1 1.6 - 8.3 10e3/uL    Absolute Lymphocytes 1.3 0.8 - 5.3 10e3/uL    Absolute Monocytes 0.6 0.0 - 1.3 10e3/uL    Absolute Eosinophils 0.1 0.0 - 0.7 10e3/uL    Absolute Basophils 0.0 0.0 - 0.2 10e3/uL    Absolute Immature Granulocytes 0.1 <=0.4 10e3/uL    Absolute NRBCs 0.0 10e3/uL   HLA Donor Specific Antibody    Collection Time: 06/02/25  8:17 AM   Result Value Ref Range    Donor Identification 07/19/2020     Organ Heart     DSA Present NO     DSA Comments        Flow Single Antigen Beads assays are intended for detection/identification of IgG anti-HLA antibodies. Mfi values may not accurately quantify  donor-specific antibody levels in all instances.    DSA Test Method SA EDTA FCS    HLA Jose D, CPRA    Collection Time: 06/02/25  8:17 AM   Result Value Ref Range    UNOS CPRA 0     UNACCEPTABLE ANTIGENS None >3000 mfi    HLA Jose D Class I, Single Antigen    Collection Time: 06/02/25  8:17 AM   Result Value Ref Range    SA 1 TEST METHOD SA EDTA FCS     SA 1 CELL Class I     SA1 HI RISK JOSE D None     SA1 MOD RISK JOSE D None     SA 1  COMMENTS        HLA PRA Test performed by modified testing procedure that may also include pretreatment of serum. Pretreatment may be the addition of fetal calf serum, EDTA, and/or adsorption.  High-risk, MFI > 3,000.  Mod-risk, -3,000.   HLA Jose D Class II, Single Antigen    Collection Time: 06/02/25  8:17 AM   Result Value Ref Range    SA 2 TEST METHOD SA EDTA FCS     SA 2 CELL Class II     SA2 HI RISK JOSE D None     SA2 MOD RISK JOSE D None     SA 2 COMMENTS        HLA PRA Test performed by modified testing procedure that may also include pretreatment of serum. Pretreatment may be the addition of fetal calf serum, EDTA, and/or adsorption.  High-risk, MFI > 3,000.  Mod-risk, -3,000.   EKG 12-lead, tracing only    Collection Time: 06/02/25  9:51 AM   Result Value Ref Range    Systolic Blood Pressure  mmHg    Diastolic Blood Pressure  mmHg    Ventricular Rate 80 BPM    Atrial Rate 80 BPM    CT Interval 112 ms    QRS Duration 92 ms     ms    QTc 431 ms    P Axis 4 degrees    R AXIS -15 degrees    T Axis 1 degrees    Interpretation ECG       Sinus rhythm  Incomplete right bundle branch block  Cannot rule out Anterior infarct , age undetermined  Abnormal ECG  When compared with ECG of 09-May-2025 17:35,  QRS duration has decreased  Minimal criteria for Anterior infarct are now Present  Inverted T waves have replaced nonspecific T wave abnormality in Inferior leads  Confirmed by MD CASTILLO, JUDE [id number]) on 6/3/2025 9:35:51 AM     EKG 12-lead, tracing only    Collection Time: 06/02/25  11:23 AM   Result Value Ref Range    Systolic Blood Pressure  mmHg    Diastolic Blood Pressure  mmHg    Ventricular Rate 83 BPM    Atrial Rate 83 BPM    MO Interval 106 ms    QRS Duration 96 ms     ms    QTc 451 ms    P Axis 14 degrees    R AXIS -16 degrees    T Axis 10 degrees    Interpretation ECG       Sinus rhythm with short MO  Incomplete right bundle branch block  Cannot rule out Anteroseptal infarct (cited on or before 02-Jun-2025)  Abnormal ECG  When compared with ECG of 02-Jun-2025 09:51, (unconfirmed)  Questionable change in initial forces of Anteroseptal leads  Confirmed by MD CASTILLO, JUDE ([id number]) on 6/3/2025 9:35:04 AM       Lab Results   Component Value Date    A1C 7.9 06/02/2025    A1C 7.1 05/06/2025    A1C 7.1 12/05/2024    A1C 7.3 07/22/2024    A1C 7.6 04/16/2024    A1C 6.9 01/16/2024    A1C 8.1 11/07/2023    A1C 8.7 07/17/2023    A1C 8.3 02/02/2023    A1C 6.7 01/14/2021    A1C 5.9 12/07/2020    A1C 8.2 07/03/2020    A1C 7.9 07/08/2019    A1C 8.5 03/11/2019          Assessment/Plan:  Mr. 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    #1 status post orthoptic heart transplantation     - He is doing well  -  He has normal graft function by cardiac MRI   -  MRI stress showed no evidence of allograft vasculopathy.   -  He has no prior DSA.  -  Sirolimus is at target level of 6-8.  Switched from mycophenolate 1500 twice a day to Azathioprine 50 mg daily.   -  We will continue him on aspirin and rosuvastatin.  -  Furosemide 20 mg daily for his elevated left-sided filling pressure from his restrictive physiology.  - Repeat echo, biopsy, DSA, and Immuknow in mid to late June to make sure he is not rejecting with the IS change from MMF to AZA.     #2 toxoplasma + serostatus - We will continue him on Bactrim 3 times a week given his low renal function.    #3 type 2 diabetes mellitus - Hemoglobin A1c is better controlled. He is  followed by the endocrinologist at the . He is on Jardiance, Trulicity and insulin.      #4 chronic kidney disease - overall stable.      #5 hypertension - BP elevated in the clinic but in the 130's at home. Will continue lisinopril 5 mg daily       #6 anemia - He is also being managed by the anemia of the CKD clinic. Will reach out to them to make sure that this is followed closely.     #7 Prevention - colonoscopy just completed. Encouraged him to see dermatology.     He will return to see us in a year with a DSE. He will have routine labs as per protocol in 6 months if the aboe testing comes back unremarkable. He will call us in the interim if any further worsening symptoms.      It was a pleasure meeting Mr. Oliveira in our heart transplant clinic at [facility].  We thank you for involving us in his care.    Total time today was 52 minutes reviewing notes, imaging, labs, patient visit, orders and documentation    Sincerely,  Arvin Sheridan MD   [facility]  Heart Failure, Transplant, and Mechanical Circulatory Support Cardiology   Cardiovascular Division  [facility]   [phone]

## 2025-06-10 NOTE — Clinical Note
Met with Lucian today and am recommending stopping pantoprazole and using calcium carbonate as needed; if heartburn becomes an issue again, pantoprazole can be restarted. Also recommending taking Jardiance and furosemide in the morning to help reduce nighttime bathroom use. Also Sergios triglycerides were elevated and I was considering increasing his rosuvastatin to 40 mg once a day. His magnesium and phosphorus were at goal at last measurement. Was wondering if he should still be on those medications, recognizing Lucian

## 2025-06-10 NOTE — PATIENT INSTRUCTIONS
Recommendations from today's MTM visit:                                                    MTM (medication therapy management) is a service provided by a clinical pharmacist designed to help you get the most of out of your medicines.      Stop pantoprazole and use Calcium Carbonate as needed  If heart burn is an issue again can restart pantoprazole  Take Jardiance and furosemide in the morning to avoid having to use the bathroom at night    Follow-up: 1 year from now or as needed      1. Suspenda el pantoprazol y use carbonato de calcio según sea necesario.  2. Si la acidez estomacal persiste, puede reiniciar el pantoprazol.  3. Gomer Jardiance y furosemida por la mañana para evitar ir al baño por la noche.    Seguimiento: Dentro de 1 año o según sea necesario.  Appointments in Next Year      Jun 19, 2025 3:00 PM  LAB with  LAB  [facility] ) [phone]     Jun 19, 2025 4:00 PM  Infusion 30 with  [facility] INFUSION NURSE,  43 [facility] ) [phone]     Jun 27, 2025 9:15 AM  (Arrive by 9:00 AM)  Diabetes Education with Bre Mi RN  [facility] Diabetes Education [place] ([facility] ) [phone]     Jun 30, 2025 2:30 PM  LAB with  LAB  [facility] ([facility] ) [phone]     Jun 30, 2025 3:30 PM  Infusion 30 with  [facility]USION NURSE,  [facility] ) [phone]     Jul 14, 2025 2:30 PM  LAB with  LAB  [facility] Lab [facility] ) [phone]     Jul 14, 2025 3:30 PM  Infusion 30 with UC [facility] INFUSION NURSE, UC 40 [facility]  [facility]  "[facility] ) [phone]     Jul 28, 2025 2:30 PM  LAB with UC LAB  [facility] [place] ([facility] ) [phone]     Jul 28, 2025 3:30 PM  Infusion 30 with UC [facility] INFUSION NURSE, UC 40 [facility]  [facility] ([facility] ) [phone]     Aug 11, 2025 2:30 PM  LAB with UC LAB  [facility] [place] ([facility] ) [phone]     Aug 11, 2025 3:30 PM  Infusion 30 with UC [facility] INFUSION NURSE, UC 40 [facility]  [facility] ([facility] ) [phone]     Aug 20, 2025 8:30 AM  (Arrive by 8:15 AM)  RETURN RETINA with Triny Bunch MD  [facility] ([facility]) [phone]     Aug 25, 2025 2:30 PM  LAB with UC LAB  [facility] ([facility] ) [phone]     Aug 25, 2025 3:30 PM  Infusion 30 with UC [facility] INFUSION NURSE, UC 40 [facility]  [facility] ([facility] ) [phone]            It was great speaking with you today.  I value your experience and would be very thankful for your time in providing feedback in our clinic survey. In the next few days, you may receive an email or text message from [employer] with a link to a survey related to your  clinical pharmacist.\"     To schedule another MTM appointment, please call the clinic directly or you may call the MTM scheduling line at [phone] or toll-free at [phone].     My Clinical Pharmacist's contact information:                                                      Please feel free to contact me with any questions or concerns you have.      Obed Ledesma, PharmD   "

## 2025-06-12 ENCOUNTER — TELEPHONE (OUTPATIENT)
Dept: EDUCATION SERVICES | Facility: CLINIC | Age: 70
End: 2025-06-12
Payer: COMMERCIAL

## 2025-06-12 NOTE — TELEPHONE ENCOUNTER
Prior Authorization Retail Medication Request    Medication/Dose:   Cequr Simplicity Insulin Delivery Device  Diagnosis and ICD code (if different than what is on RX):  Type 2 diabetes, requiring insulin  New/renewal/insurance change PA/secondary ins. PA:  Previously Tried and Failed:  Multiple Daily injections  Rationale:  Lucian eats small, frequent meals, requiring insulin coverage.  He has been unable to cover all of his food, due to the frequency of injections required.  This device will simplify his insulin regimen and improve his compliance.     Insurance   Primary: [facility] Medicare Advantage  Insurance ID:  [id number]    Secondary (if applicable):None    Pharmacy Information (if different than what is on RX)  Name:  [facility]  Phone:  [phone]  Fax: [phone]    Clinic Information  Preferred routing pool for dept communication: [facility]

## 2025-06-18 RX ORDER — DIPHENHYDRAMINE HYDROCHLORIDE 50 MG/ML
50 INJECTION, SOLUTION INTRAMUSCULAR; INTRAVENOUS
Start: 2025-07-02

## 2025-06-18 RX ORDER — MEPERIDINE HYDROCHLORIDE 25 MG/ML
25 INJECTION INTRAMUSCULAR; INTRAVENOUS; SUBCUTANEOUS
OUTPATIENT
Start: 2025-07-02

## 2025-06-18 RX ORDER — ALBUTEROL SULFATE 90 UG/1
1-2 INHALANT RESPIRATORY (INHALATION)
Start: 2025-07-02

## 2025-06-18 RX ORDER — DIPHENHYDRAMINE HYDROCHLORIDE 50 MG/ML
25 INJECTION, SOLUTION INTRAMUSCULAR; INTRAVENOUS
Start: 2025-07-02

## 2025-06-18 RX ORDER — ALBUTEROL SULFATE 0.83 MG/ML
2.5 SOLUTION RESPIRATORY (INHALATION)
OUTPATIENT
Start: 2025-07-02

## 2025-06-18 RX ORDER — METHYLPREDNISOLONE SODIUM SUCCINATE 40 MG/ML
40 INJECTION INTRAMUSCULAR; INTRAVENOUS
Start: 2025-07-02

## 2025-06-18 RX ORDER — EPINEPHRINE 1 MG/ML
0.3 INJECTION, SOLUTION INTRAMUSCULAR; SUBCUTANEOUS EVERY 5 MIN PRN
OUTPATIENT
Start: 2025-07-02

## 2025-06-19 ENCOUNTER — RESULTS FOLLOW-UP (OUTPATIENT)
Dept: TRANSPLANT | Facility: CLINIC | Age: 70
End: 2025-06-19

## 2025-06-19 ENCOUNTER — LAB (OUTPATIENT)
Dept: LAB | Facility: CLINIC | Age: 70
End: 2025-06-19
Attending: PHYSICIAN ASSISTANT
Payer: COMMERCIAL

## 2025-06-19 ENCOUNTER — INFUSION THERAPY VISIT (OUTPATIENT)
Dept: INFUSION THERAPY | Facility: CLINIC | Age: 70
End: 2025-06-19
Attending: PHYSICIAN ASSISTANT
Payer: COMMERCIAL

## 2025-06-19 VITALS
DIASTOLIC BLOOD PRESSURE: 67 MMHG | SYSTOLIC BLOOD PRESSURE: 127 MMHG | HEART RATE: 89 BPM | RESPIRATION RATE: 18 BRPM | OXYGEN SATURATION: 98 % | TEMPERATURE: 98.1 F

## 2025-06-19 DIAGNOSIS — N18.4 CKD (CHRONIC KIDNEY DISEASE) STAGE 4, GFR 15-29 ML/MIN (H): ICD-10-CM

## 2025-06-19 DIAGNOSIS — D63.1 ANEMIA OF CHRONIC RENAL FAILURE, STAGE 4 (SEVERE) (H): Primary | ICD-10-CM

## 2025-06-19 DIAGNOSIS — N18.4 CKD (CHRONIC KIDNEY DISEASE) STAGE 4, GFR 15-29 ML/MIN (H): Primary | ICD-10-CM

## 2025-06-19 DIAGNOSIS — N18.4 ANEMIA OF CHRONIC RENAL FAILURE, STAGE 4 (SEVERE) (H): Primary | ICD-10-CM

## 2025-06-19 DIAGNOSIS — N18.4 ANEMIA OF CHRONIC RENAL FAILURE, STAGE 4 (SEVERE) (H): ICD-10-CM

## 2025-06-19 DIAGNOSIS — D63.1 ANEMIA OF CHRONIC RENAL FAILURE, STAGE 4 (SEVERE) (H): ICD-10-CM

## 2025-06-19 DIAGNOSIS — Z94.1 HEART TRANSPLANT, ORTHOTOPIC, STATUS (H): ICD-10-CM

## 2025-06-19 DIAGNOSIS — Z94.1 HEART REPLACED BY TRANSPLANT (H): ICD-10-CM

## 2025-06-19 LAB
CREAT UR-MCNC: 44.9 MG/DL
FERRITIN SERPL-MCNC: 505 NG/ML (ref 31–409)
HCT VFR BLD AUTO: 29.4 % (ref 40–53)
HGB BLD-MCNC: 9.1 G/DL (ref 13.3–17.7)
IRON BINDING CAPACITY (ROCHE): 200 UG/DL (ref 240–430)
IRON SATN MFR SERPL: 17 % (ref 15–46)
IRON SERPL-MCNC: 34 UG/DL (ref 61–157)
MCV RBC AUTO: 90 FL (ref 78–100)
MICROALBUMIN UR-MCNC: 54.8 MG/L
MICROALBUMIN/CREAT UR: 122.05 MG/G CR (ref 0–17)

## 2025-06-19 PROCEDURE — 82570 ASSAY OF URINE CREATININE: CPT | Performed by: FAMILY MEDICINE

## 2025-06-19 PROCEDURE — 250N000011 HC RX IP 250 OP 636: Mod: JZ | Performed by: PHYSICIAN ASSISTANT

## 2025-06-19 PROCEDURE — 96372 THER/PROPH/DIAG INJ SC/IM: CPT | Performed by: PHYSICIAN ASSISTANT

## 2025-06-19 PROCEDURE — 99000 SPECIMEN HANDLING OFFICE-LAB: CPT | Performed by: PATHOLOGY

## 2025-06-19 RX ADMIN — DARBEPOETIN ALFA 40 MCG: 40 INJECTION, SOLUTION INTRAVENOUS; SUBCUTANEOUS at 15:48

## 2025-06-19 ASSESSMENT — PAIN SCALES - GENERAL: PAINLEVEL_OUTOF10: NO PAIN (0)

## 2025-06-19 NOTE — PROGRESS NOTES
Infusion Nursing Note:  Lucian Oliveira presents today for aranesp.    Patient seen by provider today: No   present during visit today: Not Applicable.    Note:   Administrations This Visit       darbepoetin stefano-polysorbate (ARANESP) injection 40 mcg       Admin Date  06/19/2025 Action  $Given Dose  40 mcg Route  Subcutaneous Documented By  Shazia Savage RN                  Intravenous Access:  Labs drawn on 1st floor prior to [facility] appointment.    Treatment Conditions:  Results reviewed, labs MET treatment parameters, ok to proceed with treatment.  Hemoglobin   Date Value Ref Range Status   06/19/2025 9.1 (L) 13.3 - 17.7 g/dL Final           Post Infusion Assessment:  Patient tolerated injection without incident.  Site patent and intact, free from redness, edema or discomfort.     Discharge Plan:   Patient and/or family verbalized understanding of discharge instructions and all questions answered.  Patient discharged in stable condition accompanied by: self.    /67 (BP Location: Left arm, Patient Position: Sitting, Cuff Size: Adult Regular)   Pulse 89   Temp 98.1  F (36.7  C) (Oral)   Resp 18   SpO2 98%     Shazia Savage RN

## 2025-06-23 ENCOUNTER — PATIENT OUTREACH (OUTPATIENT)
Dept: CARE COORDINATION | Facility: CLINIC | Age: 70
End: 2025-06-23
Payer: COMMERCIAL

## 2025-06-23 NOTE — PROGRESS NOTES
Anemia Management Note - Follow Up      SUBJECTIVE/OBJECTIVE:    Referred by Arlin Guajardo NP  on 2023  Orders are under Hiral Huffman PA-C  Primary Diagnosis: Anemia in Chronic Kidney Disease (N18.4, D63.1)     Secondary Diagnosis:  Other-Heart Transplant (Z94.1)  Transplant [id number]  Hgb goal range:  9-10  Epo/Darbo: Aranesp  40 mcg  every two weeks for Hgb <10, In clinic   Therapy plan allowed to . Last dose   Iron regimen:  Ferrous Sulfate BID  Ferrous Sulfate  once daily, increased to BID on 502     Recent DANDY use, transfusion, IV iron: none     Labs : 559038  RX/TX plans : 523380     No history of stroke, MI, or cancers. Hx venous thrombus. Previously anticoagulated.     Contact:            Ok to leave message regarding Scheduling, billing and medical information per consent to communicate dated 310                              OK to speak with children Elisabeth Rivers and Lucian Oliveira Jr. regarding Scheduling, billing and medical information per consent to communicate dated 310        Latest Ref Rng & Units 2025 2025 5/15/2025 2025 2025 2025 2025   Anemia   HGB Goal        9 - 10   DANDY Dose        40mcg   Hemoglobin 13.3 - 17.7 g/dL 8.9  8.6  7.8  8.1  7.8  8.8  9.1    TSAT 15 - 46 %     28   17    Ferritin 31 - 409 ng/mL     451   505      BP Readings from Last 3 Encounters:   25 127/67   25 138/73   25 126/69     Wt Readings from Last 2 Encounters:   25 83 kg (183 lb)   25 80.9 kg (178 lb 4.8 oz)         ASSESSMENT:    Hgb:at goal - received dose in clinic - recommend continue current regimen  TSat: not at goal (>30%) but ferritin >500ng/mL.  IV iron not indicated at this time per anemia protocol. Ferritin: At goal (>100ng/mL)   Goals Addressed    None         PLAN:  Dose with Aranesp.  RTC for hgb then Aranesp if needed in 2 week(s).    Orders needed to be renewed (for next follow-up date) in EPIC:  None    Iron labs due:  mid July 2025    Plan discussed with:  no call, chart reviewed      NEXT FOLLOW-UP DATE:  070125 chart dose, appt on 063025    Carrie Leopold, RN BSN  Anemia Services  [facility]  [email or web address]  Office: [phone]  Fax [phone]

## 2025-06-25 ENCOUNTER — APPOINTMENT (OUTPATIENT)
Dept: MEDSURG UNIT | Facility: CLINIC | Age: 70
End: 2025-06-25
Attending: INTERNAL MEDICINE
Payer: COMMERCIAL

## 2025-06-25 ENCOUNTER — HOSPITAL ENCOUNTER (OUTPATIENT)
Facility: CLINIC | Age: 70
Discharge: HOME | End: 2025-06-25
Attending: INTERNAL MEDICINE | Admitting: INTERNAL MEDICINE
Payer: COMMERCIAL

## 2025-06-25 ENCOUNTER — LAB (OUTPATIENT)
Dept: LAB | Facility: CLINIC | Age: 70
End: 2025-06-25
Attending: INTERNAL MEDICINE
Payer: COMMERCIAL

## 2025-06-25 ENCOUNTER — HOSPITAL ENCOUNTER (OUTPATIENT)
Dept: CARDIOLOGY | Facility: CLINIC | Age: 70
Discharge: HOME | End: 2025-06-25
Attending: INTERNAL MEDICINE | Admitting: INTERNAL MEDICINE
Payer: COMMERCIAL

## 2025-06-25 VITALS
SYSTOLIC BLOOD PRESSURE: 150 MMHG | RESPIRATION RATE: 16 BRPM | DIASTOLIC BLOOD PRESSURE: 72 MMHG | OXYGEN SATURATION: 98 % | BODY MASS INDEX: 29.9 KG/M2 | TEMPERATURE: 97.9 F | HEIGHT: 65 IN | HEART RATE: 86 BPM | WEIGHT: 179.45 LBS

## 2025-06-25 DIAGNOSIS — Z94.1 HEART REPLACED BY TRANSPLANT (H): ICD-10-CM

## 2025-06-25 DIAGNOSIS — D63.1 ANEMIA OF CHRONIC RENAL FAILURE, STAGE 4 (SEVERE) (H): ICD-10-CM

## 2025-06-25 DIAGNOSIS — N18.4 ANEMIA OF CHRONIC RENAL FAILURE, STAGE 4 (SEVERE) (H): ICD-10-CM

## 2025-06-25 DIAGNOSIS — N18.4 CKD (CHRONIC KIDNEY DISEASE) STAGE 4, GFR 15-29 ML/MIN (H): ICD-10-CM

## 2025-06-25 LAB
ANION GAP SERPL CALCULATED.3IONS-SCNC: 12 MMOL/L (ref 7–15)
BUN SERPL-MCNC: 30.7 MG/DL (ref 8–23)
CALCIUM SERPL-MCNC: 9.2 MG/DL (ref 8.8–10.4)
CHLORIDE SERPL-SCNC: 101 MMOL/L (ref 98–107)
CREAT SERPL-MCNC: 2.38 MG/DL (ref 0.67–1.17)
EGFRCR SERPLBLD CKD-EPI 2021: 28 ML/MIN/1.73M2
ERYTHROCYTE [DISTWIDTH] IN BLOOD BY AUTOMATED COUNT: 16 % (ref 10–15)
FERRITIN SERPL-MCNC: 341 NG/ML (ref 31–409)
GLUCOSE SERPL-MCNC: 120 MG/DL (ref 70–99)
HCO3 SERPL-SCNC: 24 MMOL/L (ref 22–29)
HCT VFR BLD AUTO: 32.4 % (ref 40–53)
HGB BLD-MCNC: 9.5 G/DL (ref 13.3–17.7)
HGB BLD-MCNC: 9.8 G/DL (ref 13.3–17.7)
IRON BINDING CAPACITY (ROCHE): 242 UG/DL (ref 240–430)
IRON SATN MFR SERPL: 19 % (ref 15–46)
IRON SERPL-MCNC: 45 UG/DL (ref 61–157)
LVEF ECHO: NORMAL
MAGNESIUM SERPL-MCNC: 2.2 MG/DL (ref 1.7–2.3)
MCH RBC QN AUTO: 27.7 PG (ref 26.5–33)
MCHC RBC AUTO-ENTMCNC: 30.2 G/DL (ref 31.5–36.5)
MCV RBC AUTO: 92 FL (ref 78–100)
OXYHGB MFR BLDV: 65 % (ref 70–75)
PHOSPHATE SERPL-MCNC: 3.3 MG/DL (ref 2.5–4.5)
PLATELET # BLD AUTO: 273 10E3/UL (ref 150–450)
POTASSIUM SERPL-SCNC: 4.5 MMOL/L (ref 3.4–5.3)
RBC # BLD AUTO: 3.54 10E6/UL (ref 4.4–5.9)
SIROLIMUS BLD-MCNC: 7.5 UG/L (ref 5–15)
SODIUM SERPL-SCNC: 137 MMOL/L (ref 135–145)
TME LAST DOSE: NORMAL H
TME LAST DOSE: NORMAL H
WBC # BLD AUTO: 7.4 10E3/UL (ref 4–11)

## 2025-06-25 PROCEDURE — 80195 ASSAY OF SIROLIMUS: CPT | Performed by: INTERNAL MEDICINE

## 2025-06-25 PROCEDURE — 999N000132 HC STATISTIC PP CARE STAGE 1

## 2025-06-25 PROCEDURE — 36415 COLL VENOUS BLD VENIPUNCTURE: CPT | Performed by: INTERNAL MEDICINE

## 2025-06-25 PROCEDURE — 88307 TISSUE EXAM BY PATHOLOGIST: CPT | Mod: TC | Performed by: INTERNAL MEDICINE

## 2025-06-25 PROCEDURE — 82810 BLOOD GASES O2 SAT ONLY: CPT

## 2025-06-25 PROCEDURE — 85018 HEMOGLOBIN: CPT

## 2025-06-25 PROCEDURE — C1894 INTRO/SHEATH, NON-LASER: HCPCS | Performed by: INTERNAL MEDICINE

## 2025-06-25 PROCEDURE — 999N000142 HC STATISTIC PROCEDURE PREP ONLY

## 2025-06-25 PROCEDURE — 83540 ASSAY OF IRON: CPT | Performed by: PHYSICIAN ASSISTANT

## 2025-06-25 PROCEDURE — 86832 HLA CLASS I HIGH DEFIN QUAL: CPT | Performed by: INTERNAL MEDICINE

## 2025-06-25 PROCEDURE — 272N000001 HC OR GENERAL SUPPLY STERILE: Performed by: INTERNAL MEDICINE

## 2025-06-25 PROCEDURE — 83735 ASSAY OF MAGNESIUM: CPT | Performed by: INTERNAL MEDICINE

## 2025-06-25 PROCEDURE — 93505 ENDOMYOCARDIAL BIOPSY: CPT | Mod: 26 | Performed by: INTERNAL MEDICINE

## 2025-06-25 PROCEDURE — 93306 TTE W/DOPPLER COMPLETE: CPT

## 2025-06-25 PROCEDURE — 88350 IMFLUOR EA ADDL 1ANTB STN PX: CPT | Mod: 26 | Performed by: PATHOLOGY

## 2025-06-25 PROCEDURE — C1751 CATH, INF, PER/CENT/MIDLINE: HCPCS | Performed by: INTERNAL MEDICINE

## 2025-06-25 PROCEDURE — 82728 ASSAY OF FERRITIN: CPT | Performed by: PHYSICIAN ASSISTANT

## 2025-06-25 PROCEDURE — 88307 TISSUE EXAM BY PATHOLOGIST: CPT | Mod: 26 | Performed by: PATHOLOGY

## 2025-06-25 PROCEDURE — 250N000009 HC RX 250: Performed by: INTERNAL MEDICINE

## 2025-06-25 PROCEDURE — 85018 HEMOGLOBIN: CPT | Performed by: INTERNAL MEDICINE

## 2025-06-25 PROCEDURE — T1013 SIGN LANG/ORAL INTERPRETER: HCPCS

## 2025-06-25 PROCEDURE — 86352 CELL FUNCTION ASSAY W/STIM: CPT | Performed by: INTERNAL MEDICINE

## 2025-06-25 PROCEDURE — 88346 IMFLUOR 1ST 1ANTB STAIN PX: CPT | Mod: 26 | Performed by: PATHOLOGY

## 2025-06-25 PROCEDURE — 82374 ASSAY BLOOD CARBON DIOXIDE: CPT | Performed by: INTERNAL MEDICINE

## 2025-06-25 PROCEDURE — 86833 HLA CLASS II HIGH DEFIN QUAL: CPT | Performed by: INTERNAL MEDICINE

## 2025-06-25 PROCEDURE — 84100 ASSAY OF PHOSPHORUS: CPT | Performed by: INTERNAL MEDICINE

## 2025-06-25 PROCEDURE — 93505 ENDOMYOCARDIAL BIOPSY: CPT | Performed by: INTERNAL MEDICINE

## 2025-06-25 RX ORDER — LIDOCAINE 40 MG/G
CREAM TOPICAL
Status: COMPLETED | OUTPATIENT
Start: 2025-06-25 | End: 2025-06-25

## 2025-06-25 RX ADMIN — LIDOCAINE: 40 CREAM TOPICAL at 10:53

## 2025-06-25 ASSESSMENT — ACTIVITIES OF DAILY LIVING (ADL)
ADLS_ACUITY_SCORE: 58

## 2025-06-25 NOTE — PROGRESS NOTES
Pt back on 2A post RHC. Vitals stable. Denies pain. Right internal jugular site covered with primapore soft, dry and intact. Pt eating/drinking. Discharge instructions were reviewed with pt and spouse through in person  and they have no further questions. Copy of AVS given. Pt will discharge once bedside Echo done.

## 2025-06-25 NOTE — PROGRESS NOTES
Bedside Echo done. Right internal jugular remained soft, dry and intact. Pt discharged to the [facility] ambulatory accompanied by his spouse. Pt has all his belongings.

## 2025-06-25 NOTE — PROGRESS NOTES
Pt arrived on 2A, room 12 for RHC, heart biopsy. Vitals stable. Denies any pain. Lidocaine applied to Right neck. Labs in process. Needs consent signed. In person  and spouse Shivani at the bedside.

## 2025-06-25 NOTE — DISCHARGE INSTRUCTIONS
[facility]                        Interventional Cardiology  Discharge Instructions   Post Right Heart Cath and/or Heart Biopsy      AFTER YOU GO HOME:  Take it easy for the rest of the day: Do not do strenuous exercise and do not lift, pull, or push anything heavy.  For at least 24 hours, keep the bandage over the spot where the catheter was inserted.   You may shower 24 hours after the procedure. Do not scrub the procedure site vigorously. Pat the incision dry. Do not submerge the site (ie bath, pool) for 48 hours  No lotion or powder to the puncture site for 3 days  You may put an ice or a cold pack on the area for 10 to 20 minutes at a time to help with soreness or swelling.   Resume your regular diet and medications unless otherwise instructed by your Primary Physician    CALL THE PHYSICIAN IF  If you have a fast-growing, painful lump at the catheter site.  If you have symptoms of infection, such as: Increased pain, swelling, warmth, redness, red streaks leading from the area, pus draining from the area, and/or a fever.    ADDITIONAL INSTRUCTIONS: Monitor neck site for bleeding, swelling, or voice changes. If you notice bleeding or swelling immediately apply pressure to the site for 15 minutes, and call number below to speak with Cardiology Fellow.  If you experience any changes in your breathing you should call your doctor immediately or come to the closest Emergency Department.  Do not drive yourself    MEDICATIONS: You are to resume all home medications including anticoagulation therapy unless otherwise advised by your primary cardiologist or nurse coordinator.    Follow Up: Per your primary cardiology team    If you have any questions or concerns regarding your procedure site please call [phone] at any time & press option 4 to speak to the .  Ask for the Cardiology Fellow on call.  They are available 24 hours a day.  You may also contact the Cardiology Clinic after hours  number at [phone].                                                       Telephone Numbers [phone] Monday-Friday 8:00 am to 4:30 pm    [phone] After 4:30 pm Monday-Friday, Weekends & Holidays  Ask for Interventional Cardiologist on call. Someone is on call 24 hours/day   [facility] toll free number [phone] Monday-Friday 8:00 am to 4:30 pm   [facility] Emergency Dept [phone]

## 2025-06-25 NOTE — Clinical Note
dry, intact, no bleeding and no hematoma. RIJ 7fr sheath removed, pressure held, hemostasis and primapore

## 2025-06-25 NOTE — PRE-PROCEDURE
GENERAL PRE-PROCEDURE:   Procedure:  Right heart catheterization and endomyocardial biopsy  Date/Time:  6/25/2025 11:26 AM    Verbal consent obtained?: Yes    Risks and benefits: Risks, benefits and alternatives were discussed    Consent given by:  Patient  Patient states understanding of procedure being performed: Yes    Patient's understanding of procedure matches consent: Yes    Procedure consent matches procedure scheduled: Yes    Appropriately NPO:  Yes  ASA Class:  3  Mallampati  :  Grade 3- soft palate visible, posterior pharyngeal wall not visible  Lungs:  Lungs clear with good breath sounds bilaterally  Heart:  Normal heart sounds and rate  History & Physical reviewed:  History and physical reviewed and no updates needed  Statement of review:  I have reviewed the lab findings, diagnostic data, medications, and the plan for sedation

## 2025-06-26 ENCOUNTER — RESULTS FOLLOW-UP (OUTPATIENT)
Dept: NEPHROLOGY | Facility: CLINIC | Age: 70
End: 2025-06-26

## 2025-06-26 ENCOUNTER — TELEPHONE (OUTPATIENT)
Dept: NEPHROLOGY | Facility: CLINIC | Age: 70
End: 2025-06-26
Payer: COMMERCIAL

## 2025-06-26 ENCOUNTER — RESULTS FOLLOW-UP (OUTPATIENT)
Dept: TRANSPLANT | Facility: CLINIC | Age: 70
End: 2025-06-26

## 2025-06-26 ENCOUNTER — TELEPHONE (OUTPATIENT)
Dept: TRANSPLANT | Facility: CLINIC | Age: 70
End: 2025-06-26
Payer: COMMERCIAL

## 2025-06-26 DIAGNOSIS — E11.3513 TYPE 2 DIABETES MELLITUS WITH PROLIFERATIVE RETINOPATHY OF BOTH EYES AND MACULAR EDEMA, UNSPECIFIED WHETHER LONG TERM INSULIN USE (H): ICD-10-CM

## 2025-06-26 NOTE — TELEPHONE ENCOUNTER
Pt called with heart biopsy/lab/echo results from this week's visit.  Heart biopsy neg for ACR- [facility] pending.  Echo WNL.  Siro at goal.  No changes needed at this time.  Pt called using  and states understanding results.

## 2025-06-26 NOTE — TELEPHONE ENCOUNTER
Patient confirmed scheduled appointment:  Date: 11/6  Time: 9:50am  Visit type: Return Neph   Provider: Hiral Vazquez   Location[place] [facility]  Testing/imaging: Lab scheduled same day   Additional notes: per result notes- Pt needs a follow up with Hiral. See AVS for details.   Pt declined sooner VV, scheduled next available and added to wait list

## 2025-06-27 LAB
PATH REPORT.ADDENDUM SPEC: NORMAL
PATH REPORT.COMMENTS IMP SPEC: NORMAL
PATH REPORT.FINAL DX SPEC: NORMAL
PATH REPORT.GROSS SPEC: NORMAL
PATH REPORT.MICROSCOPIC SPEC OTHER STN: NORMAL
PATH REPORT.RELEVANT HX SPEC: NORMAL
PHOTO IMAGE: NORMAL

## 2025-06-29 LAB — IMMUKNOW IMMUNE CELL FUNCTION: 590 NG/ML

## 2025-06-29 RX ORDER — ALBUTEROL SULFATE 90 UG/1
1-2 INHALANT RESPIRATORY (INHALATION)
Start: 2025-07-03

## 2025-06-29 RX ORDER — METHYLPREDNISOLONE SODIUM SUCCINATE 40 MG/ML
40 INJECTION INTRAMUSCULAR; INTRAVENOUS
Start: 2025-07-03

## 2025-06-29 RX ORDER — DIPHENHYDRAMINE HYDROCHLORIDE 50 MG/ML
25 INJECTION, SOLUTION INTRAMUSCULAR; INTRAVENOUS
Start: 2025-07-03

## 2025-06-29 RX ORDER — EPINEPHRINE 1 MG/ML
0.3 INJECTION, SOLUTION INTRAMUSCULAR; SUBCUTANEOUS EVERY 5 MIN PRN
OUTPATIENT
Start: 2025-07-03

## 2025-06-29 RX ORDER — DIPHENHYDRAMINE HYDROCHLORIDE 50 MG/ML
50 INJECTION, SOLUTION INTRAMUSCULAR; INTRAVENOUS
Start: 2025-07-03

## 2025-06-29 RX ORDER — MEPERIDINE HYDROCHLORIDE 25 MG/ML
25 INJECTION INTRAMUSCULAR; INTRAVENOUS; SUBCUTANEOUS
OUTPATIENT
Start: 2025-07-03

## 2025-06-29 RX ORDER — ALBUTEROL SULFATE 0.83 MG/ML
2.5 SOLUTION RESPIRATORY (INHALATION)
OUTPATIENT
Start: 2025-07-03

## 2025-06-30 ENCOUNTER — INFUSION THERAPY VISIT (OUTPATIENT)
Dept: INFUSION THERAPY | Facility: CLINIC | Age: 70
End: 2025-06-30
Attending: PHYSICIAN ASSISTANT
Payer: COMMERCIAL

## 2025-06-30 ENCOUNTER — LAB (OUTPATIENT)
Dept: LAB | Facility: CLINIC | Age: 70
End: 2025-06-30
Attending: PHYSICIAN ASSISTANT
Payer: COMMERCIAL

## 2025-06-30 VITALS
HEART RATE: 78 BPM | OXYGEN SATURATION: 98 % | DIASTOLIC BLOOD PRESSURE: 61 MMHG | RESPIRATION RATE: 16 BRPM | SYSTOLIC BLOOD PRESSURE: 128 MMHG

## 2025-06-30 DIAGNOSIS — N18.4 CKD (CHRONIC KIDNEY DISEASE) STAGE 4, GFR 15-29 ML/MIN (H): ICD-10-CM

## 2025-06-30 DIAGNOSIS — D63.1 ANEMIA OF CHRONIC RENAL FAILURE, STAGE 4 (SEVERE) (H): ICD-10-CM

## 2025-06-30 DIAGNOSIS — N18.4 ANEMIA OF CHRONIC RENAL FAILURE, STAGE 4 (SEVERE) (H): Primary | ICD-10-CM

## 2025-06-30 DIAGNOSIS — Z94.1 HEART REPLACED BY TRANSPLANT (H): ICD-10-CM

## 2025-06-30 DIAGNOSIS — D63.1 ANEMIA OF CHRONIC RENAL FAILURE, STAGE 4 (SEVERE) (H): Primary | ICD-10-CM

## 2025-06-30 DIAGNOSIS — N18.4 ANEMIA OF CHRONIC RENAL FAILURE, STAGE 4 (SEVERE) (H): ICD-10-CM

## 2025-06-30 DIAGNOSIS — Z94.1 HEART TRANSPLANT, ORTHOTOPIC, STATUS (H): ICD-10-CM

## 2025-06-30 LAB
DONOR IDENTIFICATION: NORMAL
DSA COMMENTS: NORMAL
DSA PRESENT: NO
DSA TEST METHOD: NORMAL
HCT VFR BLD AUTO: 31.3 % (ref 40–53)
HGB BLD-MCNC: 9.6 G/DL (ref 13.3–17.7)
MCV RBC AUTO: 89 FL (ref 78–100)
ORGAN: NORMAL
SA 1  COMMENTS: NORMAL
SA 1 CELL: NORMAL
SA 1 TEST METHOD: NORMAL
SA 2 CELL: NORMAL
SA 2 COMMENTS: NORMAL
SA 2 TEST METHOD: NORMAL
SA1 HI RISK ABY: NORMAL
SA1 MOD RISK ABY: NORMAL
SA2 HI RISK ABY: NORMAL
SA2 MOD RISK ABY: NORMAL
UNACCEPTABLE ANTIGENS: NORMAL
UNOS CPRA: 0

## 2025-06-30 PROCEDURE — 96372 THER/PROPH/DIAG INJ SC/IM: CPT | Performed by: PHYSICIAN ASSISTANT

## 2025-06-30 PROCEDURE — 85014 HEMATOCRIT: CPT | Performed by: PATHOLOGY

## 2025-06-30 PROCEDURE — 85018 HEMOGLOBIN: CPT | Performed by: PATHOLOGY

## 2025-06-30 PROCEDURE — 250N000011 HC RX IP 250 OP 636: Mod: JZ | Performed by: PHYSICIAN ASSISTANT

## 2025-06-30 PROCEDURE — 36415 COLL VENOUS BLD VENIPUNCTURE: CPT | Performed by: PATHOLOGY

## 2025-06-30 RX ADMIN — DARBEPOETIN ALFA 40 MCG: 40 INJECTION, SOLUTION INTRAVENOUS; SUBCUTANEOUS at 15:23

## 2025-06-30 ASSESSMENT — PAIN SCALES - GENERAL: PAINLEVEL_OUTOF10: NO PAIN (0)

## 2025-06-30 NOTE — PROGRESS NOTES
Infusion Nursing Note:  Lucian Oliveira presents today for aranesp.    Patient seen by provider today: No   present during visit today: Not Applicable.    Note: Injection given in left upper arm.      Intravenous Access:  Labs drawn [facility] appointment.    Treatment Conditions:  Results reviewed, labs MET treatment parameters, ok to proceed with treatment.  Hemoglobin   Date Value Ref Range Status   06/30/2025 9.6 (L) 13.3 - 17.7 g/dL Final           Hematocrit   Date Value Ref Range Status   06/30/2025 31.3 (L) 40.0 - 53.0 % Final           Post Infusion Assessment:  Patient tolerated injection without incident.  Site patent and intact, free from redness, edema or discomfort.     Discharge Plan:   Patient and/or family verbalized understanding of discharge instructions and all questions answered.  Patient discharged in stable condition accompanied by: self and wife (in lobby).    Administrations This Visit       darbepoetin stefano-polysorbate (ARANESP) injection 40 mcg       Admin Date  06/30/2025 Action  $Given Dose  40 mcg Route  Subcutaneous Documented By  Shazia Savage RN                  /61   Pulse 78   Resp 16   SpO2 98%     Shazia Savage RN

## 2025-06-30 NOTE — PATIENT INSTRUCTIONS
Dear Lucian Oliveira    Thank you for choosing [facility] ([facility] for your Aranesp.  The following information is a summary of our appointment as well as important reminders.        If you have any questions on your upcoming Specialty Infusion appointments, please call scheduling at [phone].  It was a pleasure taking care of you today.    Sincerely,    [facility]  [address] [place] [address]  [address]  Phone:  [phone]

## 2025-06-30 NOTE — PROGRESS NOTES
Infusion Nursing Note:  Lucian Oliveira presents today for Aranesp, every 14 days (at least 11 days apart).    Patient seen by provider today: No   present during visit today: Yes, Language: New Zealander ***.     Note:   Last dose 6/19.  Aranesp given subcutaneous to ***    Intravenous Access:  Labs drawn prior to arrival.     Treatment Conditions:  Results reviewed, labs MET treatment parameters, ok to proceed with treatment.   Latest Reference Range & Units 06/30/25 14:52   Hemoglobin 13.3 - 17.7 g/dL 9.6 (L)       Post Infusion Assessment:  {UMHPOSTINFUSION:831445}       Discharge Plan:   {UMHDISCHARGE:655612}    ***  ***

## 2025-07-01 ENCOUNTER — PATIENT OUTREACH (OUTPATIENT)
Dept: CARE COORDINATION | Facility: CLINIC | Age: 70
End: 2025-07-01
Payer: COMMERCIAL

## 2025-07-01 DIAGNOSIS — Z94.1 HEART TRANSPLANT, ORTHOTOPIC, STATUS (H): ICD-10-CM

## 2025-07-01 NOTE — PROGRESS NOTES
Anemia Management Note - Follow Up      SUBJECTIVE/OBJECTIVE:    Referred by Arlin Guajardo NP  on 2023  Orders are under Hiral Huffman PA-C  Primary Diagnosis: Anemia in Chronic Kidney Disease (N18.4, D63.1)     Secondary Diagnosis:  Other-Heart Transplant (Z94.1)  Transplant [id number]  Hgb goal range:  9-10  Epo/Darbo: Aranesp  40 mcg  every two weeks for Hgb <10, In clinic   Therapy plan allowed to . Last dose   Iron regimen:  Ferrous Sulfate BID  Ferrous Sulfate  once daily, increased to BID on 502     Recent DANDY use, transfusion, IV iron: none     Labs : 986300  RX/TX plans : 879511     No history of stroke, MI, or cancers. Hx venous thrombus. Previously anticoagulated.     Contact:            Ok to leave message regarding Scheduling, billing and medical information per consent to communicate dated 310                              OK to speak with children Elisabeth Rivers and Lucian Oliveira Jr. regarding Scheduling, billing and medical information per consent to communicate dated 310        Latest Ref Rng & Units 5/15/2025 2025 2025 2025 2025 2025 2025   Anemia   HGB Goal      9 - 10  9 - 10   DANDY Dose      40mcg  40mcg   Hemoglobin 13.3 - 17.7 g/dL 7.8  8.1  7.8  8.8  9.1  9.8  9.6    TSAT 15 - 46 %   28   17  19     Ferritin 31 - 409 ng/mL   451   505  341       BP Readings from Last 3 Encounters:   25 128/61   25 (!) 150/72   25 127/67     Wt Readings from Last 2 Encounters:   25 81.4 kg (179 lb 7.3 oz)   25 83 kg (183 lb)         ASSESSMENT:    Hgb:at goal - received dose in clinic - recommend continue current regimen  TSat: not at goal of >30% Ferritin: At goal (>100ng/mL)   Goals Addressed    None         PLAN:  Dose with Aranesp.  RTC for hgb then Aranesp if needed in 2 week(s).  Confirmed he is taking oral iron BID. Discussed course of IV iron. He declined, saying he'd prefer to wait. RN advised  adding high-iron foods to his diet.     Orders needed to be renewed (for next follow-up date) in EPIC: None    Iron labs due:  monthly    Plan discussed with:  Lucian as above. He declined offer to have .. He was driving and had to end call before RN could explain high iron foods further.       NEXT FOLLOW-UP DATE:  072925 chart doses, review iron studies    Carrie Leopold, RN BSN  Anemia Services  [facility]  [email or web address]  Office: [phone]  Fax [phone]

## 2025-07-03 RX ORDER — TAMSULOSIN HYDROCHLORIDE 0.4 MG/1
CAPSULE ORAL
Qty: 100 CAPSULE | Refills: 3 | Status: SHIPPED | OUTPATIENT
Start: 2025-07-03

## 2025-07-11 DIAGNOSIS — Z79.4 TYPE 2 DIABETES MELLITUS WITH DIABETIC NEPHROPATHY, WITH LONG-TERM CURRENT USE OF INSULIN (H): ICD-10-CM

## 2025-07-11 DIAGNOSIS — E11.21 TYPE 2 DIABETES MELLITUS WITH DIABETIC NEPHROPATHY, WITH LONG-TERM CURRENT USE OF INSULIN (H): ICD-10-CM

## 2025-07-13 RX ORDER — DIPHENHYDRAMINE HYDROCHLORIDE 50 MG/ML
50 INJECTION, SOLUTION INTRAMUSCULAR; INTRAVENOUS
Start: 2025-07-17

## 2025-07-13 RX ORDER — ALBUTEROL SULFATE 90 UG/1
1-2 INHALANT RESPIRATORY (INHALATION)
Start: 2025-07-17

## 2025-07-13 RX ORDER — ALBUTEROL SULFATE 0.83 MG/ML
2.5 SOLUTION RESPIRATORY (INHALATION)
OUTPATIENT
Start: 2025-07-17

## 2025-07-13 RX ORDER — EPINEPHRINE 1 MG/ML
0.3 INJECTION, SOLUTION INTRAMUSCULAR; SUBCUTANEOUS EVERY 5 MIN PRN
OUTPATIENT
Start: 2025-07-17

## 2025-07-13 RX ORDER — MEPERIDINE HYDROCHLORIDE 25 MG/ML
25 INJECTION INTRAMUSCULAR; INTRAVENOUS; SUBCUTANEOUS
OUTPATIENT
Start: 2025-07-17

## 2025-07-13 RX ORDER — METHYLPREDNISOLONE SODIUM SUCCINATE 40 MG/ML
40 INJECTION INTRAMUSCULAR; INTRAVENOUS
Start: 2025-07-17

## 2025-07-13 RX ORDER — DIPHENHYDRAMINE HYDROCHLORIDE 50 MG/ML
25 INJECTION, SOLUTION INTRAMUSCULAR; INTRAVENOUS
Start: 2025-07-17

## 2025-07-14 ENCOUNTER — INFUSION THERAPY VISIT (OUTPATIENT)
Dept: INFUSION THERAPY | Facility: CLINIC | Age: 70
End: 2025-07-14
Attending: PHYSICIAN ASSISTANT
Payer: COMMERCIAL

## 2025-07-14 DIAGNOSIS — N18.4 CKD (CHRONIC KIDNEY DISEASE) STAGE 4, GFR 15-29 ML/MIN (H): ICD-10-CM

## 2025-07-14 DIAGNOSIS — D63.1 ANEMIA OF CHRONIC RENAL FAILURE, STAGE 4 (SEVERE) (H): Primary | ICD-10-CM

## 2025-07-14 DIAGNOSIS — Z94.1 HEART TRANSPLANT, ORTHOTOPIC, STATUS (H): ICD-10-CM

## 2025-07-14 DIAGNOSIS — N18.4 ANEMIA OF CHRONIC RENAL FAILURE, STAGE 4 (SEVERE) (H): Primary | ICD-10-CM

## 2025-07-14 LAB
HCT VFR BLD AUTO: 32.8 % (ref 40–53)
HGB BLD-MCNC: 10.4 G/DL (ref 13.3–17.7)
MCV RBC AUTO: 89 FL (ref 78–100)
MCV RBC AUTO: 89 FL (ref 78–100)

## 2025-07-14 PROCEDURE — 85018 HEMOGLOBIN: CPT | Performed by: PHYSICIAN ASSISTANT

## 2025-07-14 PROCEDURE — 36415 COLL VENOUS BLD VENIPUNCTURE: CPT | Performed by: PHYSICIAN ASSISTANT

## 2025-07-14 PROCEDURE — 85014 HEMATOCRIT: CPT | Performed by: PHYSICIAN ASSISTANT

## 2025-07-14 NOTE — PROGRESS NOTES
Infusion Nursing Note:  Lucian Oliveira presents today for Aranesp- not given.  Did not meet parameters.  Patient seen by provider today: No   present during visit today: Not Applicable.    Note: Aranesp not given today as patient did not meet the parameters.      Intravenous Access:  N/A.    Treatment Conditions:    Parameters: to give if Hgb is  < 10.   Latest Reference Range & Units 07/14/25 14:42   Hemoglobin 13.3 - 17.7 g/dL 10.4 (L)   (L): Data is abnormally low.        -Per patient he will be going on vacation starting next week, and per him his provider knew about it.  I gave him the [facility]  number to call to cancel his appointment.     Bouchra Donahue RN

## 2025-07-15 ENCOUNTER — PATIENT OUTREACH (OUTPATIENT)
Dept: CARE COORDINATION | Facility: CLINIC | Age: 70
End: 2025-07-15
Payer: COMMERCIAL

## 2025-07-15 RX ORDER — ACYCLOVIR 400 MG/1
TABLET ORAL
Qty: 9 EACH | Refills: 2 | Status: SHIPPED | OUTPATIENT
Start: 2025-07-15

## 2025-07-15 NOTE — TELEPHONE ENCOUNTER
Last Written Prescription:  Continuous Glucose Sensor (DEXCOM G7 SENSOR) MISC 10 each 2 9/9/2024     ----------------------  Last Visit Date: 5/6/25  Future Visit Date: none  ----------------------      Refill decision:   [x] Medication refilled per  Medication Refill in Ambulatory Care  policy.          Request from pharmacy:  Requested Prescriptions   Pending Prescriptions Disp Refills    Continuous Glucose Sensor (DEXCOM G7 SENSOR) MISC [Pharmacy Med Name: DEXCOM_G7 SENSOR]  2     Sig: SIMI THOMAS 10 MCINTYRE       Diabetic Supplies Protocol Passed - 7/15/2025  9:21 AM        Passed - Medication is active on med list and the sig matches. RN to manually verify dose and sig if red X/fail.     If the protocol passes (green check), you do not need to verify med dose and sig.    A prescription matches if they are the same clinical intention.    For Example: once daily and every morning are the same.    The protocol can not identify upper and lower case letters as matching and will fail.     For Example: Take 1 tablet (50 mg) by mouth daily     TAKE 1 TABLET (50 MG) BY MOUTH DAILY    For all fails (red x), verify dose and sig.    If the refill does match what is on file, the RN can still proceed to approve the refill request.       If they do not match, route to the appropriate provider.             Passed - Recent (12 month) or future (90 days) visit with authorizing provider's specialty (provided they have been seen in the past 15 months)     The patient must have completed an in-person or virtual visit within the past 12 months or has a future visit scheduled within the next 90 days with the authorizing provider s specialty.  Urgent care and e-visits do not qualify as an office visit for this protocol.          Passed - Medication indicated for associated diagnosis        Passed - Patient is 18 years of age or older

## 2025-07-22 ENCOUNTER — PATIENT OUTREACH (OUTPATIENT)
Dept: CARE COORDINATION | Facility: CLINIC | Age: 70
End: 2025-07-22
Payer: COMMERCIAL

## 2025-07-22 NOTE — PROGRESS NOTES
Anemia Management Note - Follow Up      SUBJECTIVE/OBJECTIVE:    Referred by Arlin Guajardo NP  on 2023  Orders are under Hiral Huffman PA-C  Primary Diagnosis: Anemia in Chronic Kidney Disease (N18.4, D63.1)     Secondary Diagnosis:  Other-Heart Transplant (Z94.1)  Transplant   Hgb goal range:  9-10  Epo/Darbo: Aranesp  40 mcg  every two weeks for Hgb <10, In clinic   Therapy plan allowed to . Last dose . Aranesp restarted in 2025  Iron regimen:  Ferrous Sulfate BID  Ferrous Sulfate  once daily, increased to BID on 502     Recent DANDY use, transfusion, IV iron: none     Labs : 646004  RX/TX plans : 618     No history of stroke, MI, or cancers. Hx venous thrombus. Previously anticoagulated.     Contact:            Ok to leave message regarding Scheduling, billing and medical information per consent to communicate dated 310                              OK to speak with children Elisabeth Rivers and Lucian Oliveira Jr. regarding Scheduling, billing and medical information per consent to communicate dated 310           Latest Ref Rng & Units 2025   Anemia   HGB Goal     9 - 10  9 - 10    DANDY Dose     40mcg  40mcg    Hemoglobin 13.3 - 17.7 g/dL 8.1  7.8  8.8  9.1  9.8  9.6  10.4    TSAT 15 - 46 %  28   17  19      Ferritin 31 - 409 ng/mL  451   505  341        BP Readings from Last 3 Encounters:   25 128/61   25 (!) 150/72   25 127/67     Wt Readings from Last 2 Encounters:   25 81.4 kg (179 lb 7.3 oz)   25 83 kg (183 lb)         ASSESSMENT:    Hgb:Above goal - recommend hold dose  TSat: not at goal of >30% Ferritin: At goal (>100ng/mL)   Goals Addressed    None         PLAN:  Hold Aranesp.  RTC for hgb then Aranesp if needed in 2-4 week(s). He planned to cancel appt on 728 d/t vacation.    Orders needed to be renewed (for next follow-up date) in EPIC: None    Iron  labs due:  later July 2025    Plan discussed with:  no call, chart reviewed      NEXT FOLLOW-UP DATE:  081225    Carrie Leopold, RN BSN  Anemia Services  [facility]  [email or web address]  Office: [phone]  Fax [phone]

## 2025-07-28 DIAGNOSIS — Z79.4 TYPE 2 DIABETES MELLITUS WITH DIABETIC NEPHROPATHY, WITH LONG-TERM CURRENT USE OF INSULIN (H): ICD-10-CM

## 2025-07-28 DIAGNOSIS — E11.21 TYPE 2 DIABETES MELLITUS WITH DIABETIC NEPHROPATHY, WITH LONG-TERM CURRENT USE OF INSULIN (H): ICD-10-CM

## 2025-07-30 RX ORDER — INSULIN DEGLUDEC 200 U/ML
INJECTION, SOLUTION SUBCUTANEOUS
Qty: 60 ML | Refills: 3 | Status: SHIPPED | OUTPATIENT
Start: 2025-07-30

## 2025-07-30 NOTE — TELEPHONE ENCOUNTER
Last Written Prescription:  insulin degludec (TRESIBA) 200 UNIT/ML pen 90 mL 11 7/2/2024     ----------------------  Last Visit Date: 5/6/25  Future Visit Date: none  ----------------------      Refill decision:     [x] Medication unable to be refilled by RN due to: Medication not included in refill protocol policy     Insulin and insulin pump supplies - refilled per Endocrine clinic.      Request from pharmacy:  Requested Prescriptions   Pending Prescriptions Disp Refills    TRESIBA FLEXTOUCH 200 UNIT/ML pen [Pharmacy Med Name: Tresiba FlexTouch 200 UNIT/ML Subcutaneous Solution Pen-injector] 36 mL 2     Sig: INYECTE [address]  [address]A MANANA       Insulin Protocol Failed - 7/30/2025  8:23 AM        Failed - Medication is active on med list and the sig matches. RN to manually verify dose and sig if red X/fail.     If the protocol passes (green check), you do not need to verify med dose and sig.    A prescription matches if they are the same clinical intention.    For Example: once daily and every morning are the same.    The protocol can not identify upper and lower case letters as matching and will fail.     For Example: Take 1 tablet (50 mg) by mouth daily     TAKE 1 TABLET (50 MG) BY MOUTH DAILY    For all fails (red x), verify dose and sig.    If the refill does match what is on file, the RN can still proceed to approve the refill request.       If they do not match, route to the appropriate provider.             Failed - Chart review required     Review Chart.    Do not approve if insulin is used in a pump.  Instead, direct refill request to the patient's endocrinologist.  If the patient doesn't have an endocrinologist, then send the refill to the patient's PCP for review            Passed - HgbA1C in past 3 or 6 months     If HgbA1C is 8 or greater, it needs to be on file within the past 3 months.  If less than 8, must be on file within the past 6 months.     Recent Labs   Lab Test  06/02/25  0817 10/16/18  0904 08/06/18  0000   A1C 7.9*   < >  --    HEMOGLOBINA1  --   --  8.3*    < > = values in this interval not displayed.             Passed - Recent (6 month) or future (90 days) visit with the authorizing provider's specialty (provided they have been seen in the past 9 months)     The patient must have completed an in-person or virtual visit within the past 6 months or has a future visit scheduled within the next 90 days with the authorizing provider s specialty.  Urgent care and e-visits do not quality as an office visit for this protocol.          Passed - Medication indicated for associated diagnosis     Medication is associated with one or more of the following diagnoses:   - Type 1 diabetes mellitus  - Type 2 diabetes mellitus  - Diabetic nephropathy; Prophylaxis  - Neuropathy due to diabetes mellitus; Prophylaxis  - Retinopathy due to diabetes mellitus; Prophylaxis  - Diabetes mellitus associated with cystic fibrosis  - Disorder of cardiovascular system; Prophylaxis - Type 1 diabetes mellitus   - Disorder of cardiovascular system; Prophylaxis - Type 2 diabetes mellitus            Passed - Patient is 18 years of age or older

## 2025-08-06 DIAGNOSIS — Z79.4 TYPE 2 DIABETES MELLITUS WITH DIABETIC NEPHROPATHY, WITH LONG-TERM CURRENT USE OF INSULIN (H): ICD-10-CM

## 2025-08-06 DIAGNOSIS — E11.21 TYPE 2 DIABETES MELLITUS WITH DIABETIC NEPHROPATHY, WITH LONG-TERM CURRENT USE OF INSULIN (H): ICD-10-CM

## 2025-08-06 DIAGNOSIS — E11.3513 TYPE 2 DIABETES MELLITUS WITH PROLIFERATIVE RETINOPATHY OF BOTH EYES AND MACULAR EDEMA, UNSPECIFIED WHETHER LONG TERM INSULIN USE (H): ICD-10-CM

## 2025-08-06 RX ORDER — INSULIN DEGLUDEC 200 U/ML
INJECTION, SOLUTION SUBCUTANEOUS
Qty: 60 ML | Refills: 3 | OUTPATIENT
Start: 2025-08-06

## 2025-08-06 RX ORDER — INSULIN LISPRO 100 [IU]/ML
INJECTION, SOLUTION INTRAVENOUS; SUBCUTANEOUS
Qty: 45 ML | Refills: 3 | OUTPATIENT
Start: 2025-08-06

## 2025-08-10 RX ORDER — DIPHENHYDRAMINE HYDROCHLORIDE 50 MG/ML
50 INJECTION, SOLUTION INTRAMUSCULAR; INTRAVENOUS
Start: 2025-08-14

## 2025-08-10 RX ORDER — MEPERIDINE HYDROCHLORIDE 25 MG/ML
25 INJECTION INTRAMUSCULAR; INTRAVENOUS; SUBCUTANEOUS
OUTPATIENT
Start: 2025-08-14

## 2025-08-10 RX ORDER — ALBUTEROL SULFATE 90 UG/1
1-2 INHALANT RESPIRATORY (INHALATION)
Start: 2025-08-14

## 2025-08-10 RX ORDER — ALBUTEROL SULFATE 0.83 MG/ML
2.5 SOLUTION RESPIRATORY (INHALATION)
OUTPATIENT
Start: 2025-08-14

## 2025-08-10 RX ORDER — EPINEPHRINE 1 MG/ML
0.3 INJECTION, SOLUTION INTRAMUSCULAR; SUBCUTANEOUS EVERY 5 MIN PRN
OUTPATIENT
Start: 2025-08-14

## 2025-08-10 RX ORDER — DIPHENHYDRAMINE HYDROCHLORIDE 50 MG/ML
25 INJECTION, SOLUTION INTRAMUSCULAR; INTRAVENOUS
Start: 2025-08-14

## 2025-08-10 RX ORDER — METHYLPREDNISOLONE SODIUM SUCCINATE 40 MG/ML
40 INJECTION INTRAMUSCULAR; INTRAVENOUS
Start: 2025-08-14

## 2025-08-11 ENCOUNTER — INFUSION THERAPY VISIT (OUTPATIENT)
Dept: INFUSION THERAPY | Facility: CLINIC | Age: 70
End: 2025-08-11
Attending: PHYSICIAN ASSISTANT
Payer: COMMERCIAL

## 2025-08-11 ENCOUNTER — LAB (OUTPATIENT)
Dept: LAB | Facility: CLINIC | Age: 70
End: 2025-08-11
Attending: PHYSICIAN ASSISTANT
Payer: COMMERCIAL

## 2025-08-11 DIAGNOSIS — Z94.1 HEART TRANSPLANT, ORTHOTOPIC, STATUS (H): ICD-10-CM

## 2025-08-11 DIAGNOSIS — D63.1 ANEMIA OF CHRONIC RENAL FAILURE, STAGE 4 (SEVERE) (H): ICD-10-CM

## 2025-08-11 DIAGNOSIS — N18.4 ANEMIA OF CHRONIC RENAL FAILURE, STAGE 4 (SEVERE) (H): Primary | ICD-10-CM

## 2025-08-11 DIAGNOSIS — Z94.1 HEART REPLACED BY TRANSPLANT (H): ICD-10-CM

## 2025-08-11 DIAGNOSIS — N18.4 CKD (CHRONIC KIDNEY DISEASE) STAGE 4, GFR 15-29 ML/MIN (H): ICD-10-CM

## 2025-08-11 DIAGNOSIS — N18.4 ANEMIA OF CHRONIC RENAL FAILURE, STAGE 4 (SEVERE) (H): ICD-10-CM

## 2025-08-11 DIAGNOSIS — D63.1 ANEMIA OF CHRONIC RENAL FAILURE, STAGE 4 (SEVERE) (H): Primary | ICD-10-CM

## 2025-08-11 LAB
FERRITIN SERPL-MCNC: 401 NG/ML (ref 31–409)
HCT VFR BLD AUTO: 34.5 % (ref 40–53)
HGB BLD-MCNC: 10.9 G/DL (ref 13.3–17.7)
IRON BINDING CAPACITY (ROCHE): 231 UG/DL (ref 240–430)
IRON SATN MFR SERPL: 18 % (ref 15–46)
IRON SERPL-MCNC: 41 UG/DL (ref 61–157)
MCV RBC AUTO: 89 FL (ref 78–100)

## 2025-08-11 PROCEDURE — 83550 IRON BINDING TEST: CPT | Performed by: PATHOLOGY

## 2025-08-11 PROCEDURE — 82728 ASSAY OF FERRITIN: CPT | Performed by: PATHOLOGY

## 2025-08-11 PROCEDURE — 36415 COLL VENOUS BLD VENIPUNCTURE: CPT | Performed by: PATHOLOGY

## 2025-08-11 PROCEDURE — 83540 ASSAY OF IRON: CPT | Performed by: PATHOLOGY

## 2025-08-11 PROCEDURE — 85018 HEMOGLOBIN: CPT | Performed by: PATHOLOGY

## 2025-08-11 PROCEDURE — 85014 HEMATOCRIT: CPT | Performed by: PATHOLOGY

## 2025-08-12 ENCOUNTER — PATIENT OUTREACH (OUTPATIENT)
Dept: CARE COORDINATION | Facility: CLINIC | Age: 70
End: 2025-08-12
Payer: COMMERCIAL

## 2025-08-24 RX ORDER — EPINEPHRINE 1 MG/ML
0.3 INJECTION, SOLUTION INTRAMUSCULAR; SUBCUTANEOUS EVERY 5 MIN PRN
OUTPATIENT
Start: 2025-08-28

## 2025-08-24 RX ORDER — ALBUTEROL SULFATE 90 UG/1
1-2 INHALANT RESPIRATORY (INHALATION)
Start: 2025-08-28

## 2025-08-24 RX ORDER — DIPHENHYDRAMINE HYDROCHLORIDE 50 MG/ML
50 INJECTION, SOLUTION INTRAMUSCULAR; INTRAVENOUS
Start: 2025-08-28

## 2025-08-24 RX ORDER — MEPERIDINE HYDROCHLORIDE 25 MG/ML
25 INJECTION INTRAMUSCULAR; INTRAVENOUS; SUBCUTANEOUS
OUTPATIENT
Start: 2025-08-28

## 2025-08-24 RX ORDER — METHYLPREDNISOLONE SODIUM SUCCINATE 40 MG/ML
40 INJECTION INTRAMUSCULAR; INTRAVENOUS
Start: 2025-08-28

## 2025-08-24 RX ORDER — ALBUTEROL SULFATE 0.83 MG/ML
2.5 SOLUTION RESPIRATORY (INHALATION)
OUTPATIENT
Start: 2025-08-28

## 2025-08-24 RX ORDER — DIPHENHYDRAMINE HYDROCHLORIDE 50 MG/ML
25 INJECTION, SOLUTION INTRAMUSCULAR; INTRAVENOUS
Start: 2025-08-28

## 2025-08-25 ENCOUNTER — INFUSION THERAPY VISIT (OUTPATIENT)
Dept: INFUSION THERAPY | Facility: CLINIC | Age: 70
End: 2025-08-25
Attending: PHYSICIAN ASSISTANT
Payer: COMMERCIAL

## 2025-08-25 ENCOUNTER — LAB (OUTPATIENT)
Dept: LAB | Facility: CLINIC | Age: 70
End: 2025-08-25
Attending: PHYSICIAN ASSISTANT
Payer: COMMERCIAL

## 2025-08-25 DIAGNOSIS — D63.1 ANEMIA OF CHRONIC RENAL FAILURE, STAGE 4 (SEVERE) (H): Primary | ICD-10-CM

## 2025-08-25 DIAGNOSIS — Z94.1 HEART REPLACED BY TRANSPLANT (H): ICD-10-CM

## 2025-08-25 DIAGNOSIS — D63.1 ANEMIA OF CHRONIC RENAL FAILURE, STAGE 4 (SEVERE) (H): ICD-10-CM

## 2025-08-25 DIAGNOSIS — Z94.1 HEART TRANSPLANT, ORTHOTOPIC, STATUS (H): ICD-10-CM

## 2025-08-25 DIAGNOSIS — N18.4 ANEMIA OF CHRONIC RENAL FAILURE, STAGE 4 (SEVERE) (H): Primary | ICD-10-CM

## 2025-08-25 DIAGNOSIS — N18.4 CKD (CHRONIC KIDNEY DISEASE) STAGE 4, GFR 15-29 ML/MIN (H): ICD-10-CM

## 2025-08-25 DIAGNOSIS — N18.4 ANEMIA OF CHRONIC RENAL FAILURE, STAGE 4 (SEVERE) (H): ICD-10-CM

## 2025-08-25 LAB
HCT VFR BLD AUTO: 34.2 % (ref 40–53)
HGB BLD-MCNC: 11 G/DL (ref 13.3–17.7)
MCV RBC AUTO: 86.6 FL (ref 78–100)

## 2025-08-25 PROCEDURE — 36415 COLL VENOUS BLD VENIPUNCTURE: CPT | Performed by: PATHOLOGY

## 2025-08-25 PROCEDURE — 85014 HEMATOCRIT: CPT | Performed by: PATHOLOGY

## 2025-08-25 PROCEDURE — 85018 HEMOGLOBIN: CPT | Performed by: PATHOLOGY

## 2025-08-26 ENCOUNTER — PATIENT OUTREACH (OUTPATIENT)
Dept: CARE COORDINATION | Facility: CLINIC | Age: 70
End: 2025-08-26
Payer: COMMERCIAL

## 2025-08-26 DIAGNOSIS — E11.3513 TYPE 2 DIABETES MELLITUS WITH PROLIFERATIVE RETINOPATHY OF BOTH EYES AND MACULAR EDEMA, UNSPECIFIED WHETHER LONG TERM INSULIN USE (H): Primary | ICD-10-CM

## 2025-09-04 ENCOUNTER — OFFICE VISIT (OUTPATIENT)
Dept: OPHTHALMOLOGY | Facility: CLINIC | Age: 70
End: 2025-09-04
Attending: OPHTHALMOLOGY
Payer: COMMERCIAL

## 2025-09-04 DIAGNOSIS — E11.3513 TYPE 2 DIABETES MELLITUS WITH PROLIFERATIVE RETINOPATHY OF BOTH EYES AND MACULAR EDEMA, UNSPECIFIED WHETHER LONG TERM INSULIN USE (H): ICD-10-CM

## 2025-09-04 PROCEDURE — 92134 CPTRZ OPH DX IMG PST SGM RTA: CPT | Performed by: OPHTHALMOLOGY

## 2025-09-04 PROCEDURE — G0463 HOSPITAL OUTPT CLINIC VISIT: HCPCS | Performed by: OPHTHALMOLOGY

## 2025-09-04 ASSESSMENT — TONOMETRY
OD_IOP_MMHG: 15
OS_IOP_MMHG: 16
IOP_METHOD: TONOPEN

## 2025-09-04 ASSESSMENT — VISUAL ACUITY
OD_PH_CC: 20/40
OD_CC+: +2
OS_CC: CF @ FACE
OD_CC: 20/50
METHOD: SNELLEN - LINEAR
OD_PH_CC+: -2

## 2025-09-04 ASSESSMENT — CUP TO DISC RATIO: OD_RATIO: 0.25

## 2025-09-04 ASSESSMENT — SLIT LAMP EXAM - LIDS
COMMENTS: NORMAL
COMMENTS: NORMAL

## 2025-09-04 ASSESSMENT — EXTERNAL EXAM - RIGHT EYE: OD_EXAM: NORMAL

## 2025-09-04 ASSESSMENT — EXTERNAL EXAM - LEFT EYE: OS_EXAM: NORMAL

## (undated) DEVICE — PACK HEART RIGHT CUSTOM SAN32RHF18

## (undated) DEVICE — DRAPE IOBAN INCISE 23X17" 6650EZ

## (undated) DEVICE — DRAPE MICRO 41X81"

## (undated) DEVICE — ESU HOLSTER PLASTIC DISP E2400

## (undated) DEVICE — NDL COUNTER 20CT 31142493

## (undated) DEVICE — DRSG PRIMAPORE 03 1/8X6" 66000318

## (undated) DEVICE — GLOVE ANSELL ENCORE MICRO 8 LATEX 5787005

## (undated) DEVICE — SU VICRYL 3-0 FS-1 27" J442H

## (undated) DEVICE — Device

## (undated) DEVICE — FORCEP ENDOMYOCARDIAL BIOPSY STRAIGHT 6FRX50CM 190060

## (undated) DEVICE — SU ETHIBOND 0 TIE 6X30" X306H

## (undated) DEVICE — SU CARDIONYL 5-0 TAPER DA 80CM 721202

## (undated) DEVICE — KIT HAND CONTROL ACIST 016795

## (undated) DEVICE — TAPE MEDIPORE 4"X2YD 2864

## (undated) DEVICE — INTRODUCER SHEATH FAST-CATH 7FRX40CM GSPC CVD 406772

## (undated) DEVICE — SU STEEL 6 CCS 4X18" M654G

## (undated) DEVICE — EYE PROBE ESU DIATHERMY DSP 25GA 339.21

## (undated) DEVICE — NDL 19GA 1.5" FILTER 305200

## (undated) DEVICE — INTRODUCER SHEATH FAST-CATH 7FRX85CM GSPC CVD 406772

## (undated) DEVICE — INTRO SHEATH 7FRX10CM PINNACLE RSS702

## (undated) DEVICE — SU PROLENE 3-0 SHDA 48" 8534H

## (undated) DEVICE — PREP CHLORAPREP 26ML TINTED ORANGE  260815

## (undated) DEVICE — GUIDEWIRE L80CM OD.035IN 3 CM J-TIP V

## (undated) DEVICE — SUCTION IRR SYSTEM W/O TIP INTERPULSE HANDPIECE 0210-100-000

## (undated) DEVICE — DRSG GAUZE 4X4" TRAY 6939

## (undated) DEVICE — KIT RIGHT HEART CATH H965601307191

## (undated) DEVICE — SUTURE BOOTS 051003PBX

## (undated) DEVICE — VESSEL LOOPS YELLOW MAXI 31145694

## (undated) DEVICE — INTRO SHEATH 7FRX25CM PINNACLE RSS706

## (undated) DEVICE — WIRE GUIDE 0.035"X150CM EMERALD J TIP 502521

## (undated) DEVICE — KIT RIGHT HEART CATH 60130719

## (undated) DEVICE — CATH ANGIO SUPERTORQUE PLUS JR4 6FRX100CM 533621

## (undated) DEVICE — SLEEVE TR BAND RADIAL COMPRESSION DEVICE 24CM TRB24-REG

## (undated) DEVICE — TIES BANDING T50R

## (undated) DEVICE — CONNECTOR BLAKE DRAIN SGL BCC1

## (undated) DEVICE — INTRO GLIDESHEATH SLENDER 6FR 10X45CM 60-1060

## (undated) DEVICE — SU ETHIBOND 0 CT-1 CR 8X18" CX21D

## (undated) DEVICE — PREP POVIDONE IODINE SCRUB 7.5% 4OZ APL82212

## (undated) DEVICE — EYE CANN IRR 25GA CYSTOTOME 581610

## (undated) DEVICE — CATH ANGIO INFINITI JL4 4FRX100CM 538420

## (undated) DEVICE — DEFIB PADPRO CONMED ADULT/CHILD 2001Z-C

## (undated) DEVICE — EYE NDL RETROBULBAR ATKINSON 25GA 1.5" 581637

## (undated) DEVICE — LINEN GOWN XLG 5407

## (undated) DEVICE — SOL NACL 0.9% IRRIG 1000ML BOTTLE 2F7124

## (undated) DEVICE — SYR 10ML LL W/O NDL 302995

## (undated) DEVICE — PACK VITRECTOMY/RET CUSTOM ASC PQ15VRU12

## (undated) DEVICE — SU ETHILON 10-0 CS160-6 12" 9000G

## (undated) DEVICE — SLEEVE REPOSITIONING W/CATH LOCK 60CM 406503

## (undated) DEVICE — SU CHROMIC 3-0 FS-2 27" 636

## (undated) DEVICE — ESU CORD BIPOLAR GREEN 10-4000

## (undated) DEVICE — SU PLEDGET SOFT TFE 3/8"X3/26"X1/16" PCP40

## (undated) DEVICE — TAPE TRANSPORE 1"X1.5YD 1534S-1

## (undated) DEVICE — GEL ULTRASOUND AQUASONIC 20GM 01-01

## (undated) DEVICE — SOL NACL 0.9% IRRIG 3000ML BAG 2B7477

## (undated) DEVICE — GUIDEWIRE STARTER 260CM X 0.035

## (undated) DEVICE — PREP POVIDONE IODINE SOLUTION 10% 4OZ

## (undated) DEVICE — GLOVE BIOGEL PI MICRO SZ 6.0 48560

## (undated) DEVICE — TEST TUBE W/SCREW CAP 17361

## (undated) DEVICE — SLEEVE TR BAND RADIAL COMPRESSION DEVICE 29CM XX-RF06L

## (undated) DEVICE — KIT HAND CONTROL ACIST 014644 AR-P54

## (undated) DEVICE — TAPE DURAPORE 2"X1.5YD SILK 1538S-2

## (undated) DEVICE — SYR 10ML SLIP TIP W/O NDL 303134

## (undated) DEVICE — EYE PACK CONSTELLATION VFC 8065750957

## (undated) DEVICE — TUBING PRESSURE 30"

## (undated) DEVICE — PITCHER STERILE 1000ML  SSK9004A

## (undated) DEVICE — SU ETHILON 2 LP 60" D7597

## (undated) DEVICE — SU SILK 0 TIE 6X30" A306H

## (undated) DEVICE — PACK VITRECTOMY PQ15VI57E

## (undated) DEVICE — SU PLEDGET SOFT TFE 13MMX7MMX1.5MM D7044

## (undated) DEVICE — DRAPE SLUSH/WARMER 66X44" ORS-320

## (undated) DEVICE — EYE SOL BSS 500ML BAG 0065-1795-04

## (undated) DEVICE — LINEN TOWEL PACK X6 WHITE 5487

## (undated) DEVICE — BLADE CLIPPER SGL USE 9680

## (undated) DEVICE — SYR EAR BULB 3OZ 0035830

## (undated) DEVICE — BLADE SAW SAGITTAL STERNOTOMY CV SM LINVATEC 5023-187

## (undated) DEVICE — SYR 01ML LL W/O NDL LATEX FREE 309628

## (undated) DEVICE — SU PROLENE 4-0 SHDA 36" 8521H

## (undated) DEVICE — SUCTION DRY CHEST DRAIN OASIS 3600-100

## (undated) DEVICE — ESU ELEC BLADE 2.75" COATED/INSULATED E1455

## (undated) DEVICE — INTRO SHEATH MICRO PLATINUM TIP 4FRX40CM 7274

## (undated) DEVICE — TOURNIQUET VASCULAR KIT ARGYLE 8888-585000

## (undated) DEVICE — SU PROLENE 4-0 BBDA 36" 8581

## (undated) DEVICE — CATH ANGIO INFINITI AL1 4FRX100CM 538445

## (undated) DEVICE — FASTENER CATH BALLOON CLAMPX2 STATLOCK 0684-00-493

## (undated) DEVICE — POSITIONER ARMBOARD FOAM 1PAIR LF FP-ARMB1

## (undated) DEVICE — LINEN TOWEL PACK X5 5464

## (undated) DEVICE — GLOVE PROTEXIS POWDER FREE 7.0 ORTHOPEDIC 2D73ET70

## (undated) DEVICE — ESU GROUND PAD ADULT REM W/15' CORD E7507DB

## (undated) DEVICE — GLOVE PROTEXIS MICRO 6.0  2D73PM60

## (undated) DEVICE — SU VICRYL 0 CTX 36" J370H

## (undated) DEVICE — DRAPE SHEET REV FOLD 3/4 9349

## (undated) DEVICE — DRSG DRAIN 4X4" 7086

## (undated) DEVICE — SU PROLENE 4-0 RB-1DA 36" 8557H

## (undated) DEVICE — DRSG TEGADERM 8X12" 1629

## (undated) DEVICE — PACK ADULT HEART UMMC PV15CG92D

## (undated) DEVICE — SYR 10ML FINGER CONTROL W/O NDL 309695

## (undated) DEVICE — LEAD ELEC MYOCARDIO PACING TEMPORARY 2-0 RB-1 24" TPW10

## (undated) DEVICE — SYR 05ML LL W/O NDL

## (undated) DEVICE — ADH SKIN CLOSURE PREMIERPRO EXOFIN 1.0ML 3470

## (undated) DEVICE — SOL WATER IRRIG 1000ML BOTTLE 2F7114

## (undated) DEVICE — SU VICRYL 2-0 CT-1 27" UND J259H

## (undated) DEVICE — SHTH INTRO 0.021IN ID 6FR DIA

## (undated) DEVICE — BNDG ESMARK 6" STERILE LF 820-3612

## (undated) DEVICE — SPONGE RAY-TEC 4X8" 7318

## (undated) DEVICE — DEFIB PRO-PADZ LVP LQD GEL ADULT 8900-2105-01

## (undated) DEVICE — TAPE MICROPORE 1"X1.5YD 1530S-1

## (undated) DEVICE — LINEN TOWEL PACK X30 5481

## (undated) DEVICE — TUBE CULTURE AEROBIC/ANAEROBIC W/O SWABS A.C.T.I.1. 12401

## (undated) DEVICE — MANIFOLD KIT ANGIO AUTOMATED 014613

## (undated) DEVICE — APPLICATOR COTTON TIP 6"X2 STERILE LF 6012

## (undated) DEVICE — INTRODUCER SHEATH 4FRX40CM MICROPUNC PED G47946

## (undated) DEVICE — SU PLAIN 6-0 TG140-8 18" 1735G

## (undated) DEVICE — PREP SKIN SCRUB TRAY 4461A

## (undated) DEVICE — EYE TIP IRRIGATION & ASPIRATION POLYMER 35D BENT 8065751511

## (undated) DEVICE — TUBING INSUFFLATION W/FILTER CPC TO LUER 620-030-301

## (undated) DEVICE — LABEL MEDICATION SYSTEM 3303-P

## (undated) DEVICE — SU PROLENE 5-0 RB-2DA 30" 8710H

## (undated) DEVICE — DRAPE TIBURON CARDIOVASCULAR PERI-GROIN LF 9154

## (undated) DEVICE — SU ETHIBOND 2-0TIE 30" X305H

## (undated) DEVICE — FORCEP ENDMYCRD BX DISP STR 6F

## (undated) DEVICE — GUIDEWIRE VASC 0.014INX180CM RUNTHROUGH 25-1011

## (undated) DEVICE — EYE CANN DUALBORE 25GA 3425

## (undated) DEVICE — WIRE GUIDE 0.025"X150CM EMERALD J TIP 502524

## (undated) DEVICE — DRSG MEPILEX BORDER SACRUM 6.3X7.9" 282055

## (undated) DEVICE — PROTECTOR ARM ONE-STEP TRENDELENBURG 40418

## (undated) DEVICE — EYE LENS CARTRIDGE D ALCON MONARCH

## (undated) DEVICE — VALVE HEMOSTASIS .096" COPILOT MECH 1003331

## (undated) DEVICE — SURGICEL HEMOSTAT 4X8" 1952

## (undated) DEVICE — DRSG ABDOMINAL 07 1/2X8" 7197D

## (undated) DEVICE — SYR 01ML 27GA 0.5" ECLIPSE 305789

## (undated) DEVICE — WIRE GUIDE 0.035"X150CM EMERALD STR 502542

## (undated) DEVICE — CLIP HORIZON SM RED WIDE SLOT 001201

## (undated) DEVICE — SOL NACL 0.9% 10ML VIAL 0409-4888-02

## (undated) DEVICE — EYE SHIELD PLASTIC

## (undated) DEVICE — EYE PACK CUSTOM ANTERIOR 30DEG TIP CENTURION PPK6682-04

## (undated) DEVICE — INTRO SHEATH 9FRX10CM PINNACLE RSS902

## (undated) DEVICE — EYE CANN IRR 27GA HYDRODISSECTING 585099

## (undated) DEVICE — SPONGE LAP 18X18" X8435

## (undated) DEVICE — EYE PACK 25GA CONSTELLATION 10,000 CPM PPK9380-02

## (undated) DEVICE — PREP CHLORHEXIDINE 4% 4OZ (HIBICLENS) 57504

## (undated) DEVICE — EYE DRAPE MICROSCOPE UNIVERSAL (BIOM FULL) 08-MK55140

## (undated) DEVICE — DRAPE C-ARM W/STRAPS 42X72" 07-CA104

## (undated) DEVICE — LEFT HEART PK FAIRVIEW UNIV MEDICAL CENTER

## (undated) DEVICE — SU PROLENE 6-0 C-1DA 30" 8706H

## (undated) DEVICE — GLOVE PROTEXIS W/NEU-THERA 7.0  2D73TE70

## (undated) DEVICE — CATH ANGIO INFINITI JR4 4FRX100CM 538421

## (undated) DEVICE — CATH DIAG 4FR LCB 538472

## (undated) DEVICE — EYE KNIFE STILETTO VISITEC 1.1MM ANG 45DEG SIDEPORT 376620

## (undated) DEVICE — 0.035IN X 260CM, EMERALD DIAGNOSTIC GUIDEWIRE, FIXED-CORE PTFE COATED, STANDARD, 3MM EXCHANGE J-TIP (EA/1)

## (undated) DEVICE — BONE WAX 2.5GM W31G

## (undated) DEVICE — EYE PREP BETADINE 5% SOLUTION 30ML 0065-0411-30

## (undated) DEVICE — PACK CATARACT UMMC

## (undated) DEVICE — WIPES FOLEY CARE SURESTEP PROVON DFC100

## (undated) DEVICE — SUCTION MANIFOLD DORNOCH ULTRA CART UL-CL500

## (undated) DEVICE — SU STEEL MYO/WIRE II STERNOTOMY 8 BE-1 3X14" 048-217

## (undated) DEVICE — PACK HEART LEFT CUSTOM

## (undated) DEVICE — EYE BACK FLUSH SOFT TIP 25GA VITREORETINAL 337.84

## (undated) DEVICE — CATH GUIDING BLUE YELLOW PTFE XB3 6FRX100CM 67005200

## (undated) DEVICE — INTRO SHEATH 5FRX10CM PINNACLE RSS502

## (undated) DEVICE — SU SILK 3-0 TIE 12X30" A304H

## (undated) DEVICE — DEVICE CATH STATLOCK INTRA-AORTIC BALL PUMP 0684-00-0472

## (undated) DEVICE — TOOTHBRUSH ADULT NON STERILE MDS136850

## (undated) DEVICE — TUBING PRESSURE TRANSDUCER MALE TO FEMALE LL 72" 50P172

## (undated) DEVICE — GOWN LG DISP 9515

## (undated) DEVICE — CONNECTOR DRAIN CHEST Y EXTENSION SET 19909

## (undated) DEVICE — GLOVE PROTEXIS POWDER FREE 7.5 ORTHOPEDIC 2D73ET75

## (undated) DEVICE — STPL SKIN 35W ROTATING HEAD PRW35

## (undated) DEVICE — SU ETHIBOND 3-0 BBDA 36" X588H

## (undated) DEVICE — STATLOCK PSI DEVICE

## (undated) DEVICE — SU PROLENE 5-0 RB-1DA 36"  8556H

## (undated) DEVICE — EYE KNIFE SLIT XSTAR VISITEC 2.5MM 45DEG BEVEL UP 373725

## (undated) DEVICE — EYE SHIELD PLASTIC CLEAR UNIVERSAL K9-6050

## (undated) DEVICE — TAPE MICROPORE 2"X1.5YD 1530S-2

## (undated) DEVICE — SU PROLENE 3-0 SHDA 36" 8522H

## (undated) DEVICE — NDL 18GA 1.5" 305196

## (undated) DEVICE — GLOVE PROTEXIS MICRO 8.0  2D73PM80

## (undated) DEVICE — EYE CANN IRR 27GA ANTERIOR CHAMBER 581280

## (undated) DEVICE — CATH US OD 5FR OD SEC 2.9FR EAGLE EYE PLATINUM 0.014 85900P

## (undated) DEVICE — NDL PERC ENTRY THINWALL 18GA 7.0" G00166

## (undated) DEVICE — BUR STRK SIDE CUT 1.6X5.1X44.8MM CARBIDE 6FLUTE 1607-002-107

## (undated) DEVICE — ESU GROUND PAD ADULT W/CORD E7507

## (undated) DEVICE — STRAP KNEE/BODY 31143004

## (undated) DEVICE — EYE PROBE LASER 25GA FLEX RFID ILLUMINATED 8065751593

## (undated) DEVICE — EYE KNIFE SLIT XSTAR VISITEC 2.8MM 45DEG 373728

## (undated) DEVICE — CUP AND LID 2PK 2OZ STERILE  SSK9006A

## (undated) DEVICE — EYE PERFLUORON KIT 5ML 8065900163

## (undated) DEVICE — SU ETHIBOND 5 V-37 4X30" MB66G

## (undated) DEVICE — ESU ELEC BLADE 6" COATED/INSULATED E1455-6

## (undated) DEVICE — SU MONOCRYL 4-0 PS-2 27" UND Y426H

## (undated) DEVICE — INTRO SHEATH AVANTI 4FRX23CM 504604T

## (undated) DEVICE — DRAIN CHEST TUBE RIGHT ANGLED 28FR 8128

## (undated) DEVICE — SUCTION CATH AIRLIFE TRI-FLO W/CONTROL PORT 14FR  T60C

## (undated) DEVICE — TUBING SUCTION 12"X1/4" N612

## (undated) DEVICE — DRAIN CHEST TUBE 36FR STR 8036

## (undated) DEVICE — SU ETHILON 2-0 FS 18" 664H

## (undated) DEVICE — SU ETHIBOND 2-0 SHDA 30" X563H

## (undated) DEVICE — INTRODUCER SHEATH FAST-CATH CATH-LOCK 7FRX12CM 406702

## (undated) DEVICE — CATH ANGIO INFINITI IM 4FRX100CM 538460

## (undated) DEVICE — SYR 20ML LL W/O NDL 302830

## (undated) DEVICE — GLOVE PROTEXIS MICRO 7.0  2D73PM70

## (undated) DEVICE — GLOVE PROTEXIS MICRO 7.5  2D73PM75

## (undated) DEVICE — SU VICRYL 2-0 CT-1 27" J339H

## (undated) DEVICE — CLIP HORIZON MED BLUE 002200

## (undated) RX ORDER — LIDOCAINE HYDROCHLORIDE 10 MG/ML
INJECTION, SOLUTION EPIDURAL; INFILTRATION; INTRACAUDAL; PERINEURAL
Status: DISPENSED
Start: 2020-09-23

## (undated) RX ORDER — LIDOCAINE 40 MG/G
CREAM TOPICAL
Status: DISPENSED
Start: 2020-09-14

## (undated) RX ORDER — LIDOCAINE 40 MG/G
CREAM TOPICAL
Status: DISPENSED
Start: 2020-10-12

## (undated) RX ORDER — POTASSIUM CHLORIDE 750 MG/1
TABLET, EXTENDED RELEASE ORAL
Status: DISPENSED
Start: 2020-07-02

## (undated) RX ORDER — BIVALIRUDIN 250 MG/5ML
INJECTION, POWDER, LYOPHILIZED, FOR SOLUTION INTRAVENOUS
Status: DISPENSED
Start: 2022-09-12

## (undated) RX ORDER — LIDOCAINE HYDROCHLORIDE 10 MG/ML
INJECTION, SOLUTION EPIDURAL; INFILTRATION; INTRACAUDAL; PERINEURAL
Status: DISPENSED
Start: 2020-09-28

## (undated) RX ORDER — CEFAZOLIN SODIUM 2 G/100ML
INJECTION, SOLUTION INTRAVENOUS
Status: DISPENSED
Start: 2020-07-13

## (undated) RX ORDER — LIDOCAINE 40 MG/G
CREAM TOPICAL
Status: DISPENSED
Start: 2020-08-17

## (undated) RX ORDER — CEFAZOLIN SODIUM 2 G/100ML
INJECTION, SOLUTION INTRAVENOUS
Status: DISPENSED
Start: 2020-09-23

## (undated) RX ORDER — PROPOFOL 10 MG/ML
INJECTION, EMULSION INTRAVENOUS
Status: DISPENSED
Start: 2020-09-28

## (undated) RX ORDER — LIDOCAINE HYDROCHLORIDE 10 MG/ML
INJECTION, SOLUTION EPIDURAL; INFILTRATION; INTRACAUDAL; PERINEURAL
Status: DISPENSED
Start: 2020-08-10

## (undated) RX ORDER — LIDOCAINE 40 MG/G
CREAM TOPICAL
Status: DISPENSED
Start: 2020-11-10

## (undated) RX ORDER — LIDOCAINE HYDROCHLORIDE 10 MG/ML
INJECTION, SOLUTION EPIDURAL; INFILTRATION; INTRACAUDAL; PERINEURAL
Status: DISPENSED
Start: 2020-09-14

## (undated) RX ORDER — BUPIVACAINE HYDROCHLORIDE AND EPINEPHRINE 2.5; 5 MG/ML; UG/ML
INJECTION, SOLUTION EPIDURAL; INFILTRATION; INTRACAUDAL; PERINEURAL
Status: DISPENSED
Start: 2020-07-13

## (undated) RX ORDER — AMINOPHYLLINE 25 MG/ML
INJECTION, SOLUTION INTRAVENOUS
Status: DISPENSED
Start: 2022-09-26

## (undated) RX ORDER — LIDOCAINE 40 MG/G
CREAM TOPICAL
Status: DISPENSED
Start: 2025-06-25

## (undated) RX ORDER — HEPARIN SODIUM 1000 [USP'U]/ML
INJECTION, SOLUTION INTRAVENOUS; SUBCUTANEOUS
Status: DISPENSED
Start: 2020-07-19

## (undated) RX ORDER — LIDOCAINE HYDROCHLORIDE AND EPINEPHRINE 10; 10 MG/ML; UG/ML
INJECTION, SOLUTION INFILTRATION; PERINEURAL
Status: DISPENSED
Start: 2020-09-23

## (undated) RX ORDER — PHENYLEPHRINE HCL IN 0.9% NACL 1 MG/10 ML
SYRINGE (ML) INTRAVENOUS
Status: DISPENSED
Start: 2020-07-13

## (undated) RX ORDER — LIDOCAINE 40 MG/G
CREAM TOPICAL
Status: DISPENSED
Start: 2022-09-12

## (undated) RX ORDER — FENTANYL CITRATE 50 UG/ML
INJECTION, SOLUTION INTRAMUSCULAR; INTRAVENOUS
Status: DISPENSED
Start: 2025-05-14

## (undated) RX ORDER — NITROGLYCERIN 5 MG/ML
VIAL (ML) INTRAVENOUS
Status: DISPENSED
Start: 2020-07-02

## (undated) RX ORDER — SODIUM NITROPRUSSIDE 25 MG/ML
INJECTION INTRAVENOUS
Status: DISPENSED
Start: 2019-07-10

## (undated) RX ORDER — FENTANYL CITRATE 50 UG/ML
INJECTION, SOLUTION INTRAMUSCULAR; INTRAVENOUS
Status: DISPENSED
Start: 2021-07-20

## (undated) RX ORDER — FENTANYL CITRATE 50 UG/ML
INJECTION, SOLUTION INTRAMUSCULAR; INTRAVENOUS
Status: DISPENSED
Start: 2023-05-12

## (undated) RX ORDER — POTASSIUM CHLORIDE 750 MG/1
TABLET, EXTENDED RELEASE ORAL
Status: DISPENSED
Start: 2022-09-12

## (undated) RX ORDER — CHLORHEXIDINE GLUCONATE ORAL RINSE 1.2 MG/ML
SOLUTION DENTAL
Status: DISPENSED
Start: 2020-07-13

## (undated) RX ORDER — FENTANYL CITRATE 50 UG/ML
INJECTION, SOLUTION INTRAMUSCULAR; INTRAVENOUS
Status: DISPENSED
Start: 2023-07-17

## (undated) RX ORDER — FENTANYL CITRATE 50 UG/ML
INJECTION, SOLUTION INTRAMUSCULAR; INTRAVENOUS
Status: DISPENSED
Start: 2019-08-07

## (undated) RX ORDER — LIDOCAINE 40 MG/G
CREAM TOPICAL
Status: DISPENSED
Start: 2023-07-17

## (undated) RX ORDER — LIDOCAINE 40 MG/G
CREAM TOPICAL
Status: DISPENSED
Start: 2020-08-10

## (undated) RX ORDER — FENTANYL CITRATE 50 UG/ML
INJECTION, SOLUTION INTRAMUSCULAR; INTRAVENOUS
Status: DISPENSED
Start: 2020-07-02

## (undated) RX ORDER — SODIUM CHLORIDE 9 MG/ML
INJECTION, SOLUTION INTRAVENOUS
Status: DISPENSED
Start: 2020-07-02

## (undated) RX ORDER — FENTANYL CITRATE 50 UG/ML
INJECTION, SOLUTION INTRAMUSCULAR; INTRAVENOUS
Status: DISPENSED
Start: 2020-07-19

## (undated) RX ORDER — DOBUTAMINE HYDROCHLORIDE 200 MG/100ML
INJECTION INTRAVENOUS
Status: DISPENSED
Start: 2024-07-30

## (undated) RX ORDER — GLYCOPYRROLATE 0.2 MG/ML
INJECTION INTRAMUSCULAR; INTRAVENOUS
Status: DISPENSED
Start: 2020-12-21

## (undated) RX ORDER — ASPIRIN 325 MG
TABLET ORAL
Status: DISPENSED
Start: 2022-09-12

## (undated) RX ORDER — LIDOCAINE 40 MG/G
CREAM TOPICAL
Status: DISPENSED
Start: 2020-08-31

## (undated) RX ORDER — NICARDIPINE HCL-0.9% SOD CHLOR 1 MG/10 ML
SYRINGE (ML) INTRAVENOUS
Status: DISPENSED
Start: 2021-07-20

## (undated) RX ORDER — PROPOFOL 10 MG/ML
INJECTION, EMULSION INTRAVENOUS
Status: DISPENSED
Start: 2020-09-23

## (undated) RX ORDER — SODIUM CHLORIDE 9 MG/ML
INJECTION, SOLUTION INTRAVENOUS
Status: DISPENSED
Start: 2022-09-12

## (undated) RX ORDER — LIDOCAINE 40 MG/G
CREAM TOPICAL
Status: DISPENSED
Start: 2019-03-25

## (undated) RX ORDER — ETOMIDATE 2 MG/ML
INJECTION INTRAVENOUS
Status: DISPENSED
Start: 2020-07-19

## (undated) RX ORDER — SODIUM CHLORIDE 9 MG/ML
INJECTION, SOLUTION INTRAVENOUS
Status: DISPENSED
Start: 2023-07-17

## (undated) RX ORDER — FENTANYL CITRATE 50 UG/ML
INJECTION, SOLUTION INTRAMUSCULAR; INTRAVENOUS
Status: DISPENSED
Start: 2019-12-10

## (undated) RX ORDER — FENTANYL CITRATE 50 UG/ML
INJECTION, SOLUTION INTRAMUSCULAR; INTRAVENOUS
Status: DISPENSED
Start: 2020-07-13

## (undated) RX ORDER — CYCLOPENTOLAT/TROPIC/PHENYLEPH 1%-1%-2.5%
DROPS (EA) OPHTHALMIC (EYE)
Status: DISPENSED
Start: 2023-02-06

## (undated) RX ORDER — CEFAZOLIN SODIUM 1 G/3ML
INJECTION, POWDER, FOR SOLUTION INTRAMUSCULAR; INTRAVENOUS
Status: DISPENSED
Start: 2020-07-16

## (undated) RX ORDER — NICARDIPINE HCL-0.9% SOD CHLOR 1 MG/10 ML
SYRINGE (ML) INTRAVENOUS
Status: DISPENSED
Start: 2022-09-12

## (undated) RX ORDER — LIDOCAINE 40 MG/G
CREAM TOPICAL
Status: DISPENSED
Start: 2021-09-28

## (undated) RX ORDER — HEPARIN SODIUM 1000 [USP'U]/ML
INJECTION, SOLUTION INTRAVENOUS; SUBCUTANEOUS
Status: DISPENSED
Start: 2022-09-12

## (undated) RX ORDER — NITROGLYCERIN 5 MG/ML
VIAL (ML) INTRAVENOUS
Status: DISPENSED
Start: 2022-09-12

## (undated) RX ORDER — ACETAMINOPHEN 325 MG/1
TABLET ORAL
Status: DISPENSED
Start: 2019-12-10

## (undated) RX ORDER — FENTANYL CITRATE 50 UG/ML
INJECTION, SOLUTION INTRAMUSCULAR; INTRAVENOUS
Status: DISPENSED
Start: 2020-12-21

## (undated) RX ORDER — LIDOCAINE HYDROCHLORIDE 10 MG/ML
INJECTION, SOLUTION EPIDURAL; INFILTRATION; INTRACAUDAL; PERINEURAL
Status: DISPENSED
Start: 2020-08-17

## (undated) RX ORDER — LIDOCAINE HYDROCHLORIDE 20 MG/ML
INJECTION, SOLUTION EPIDURAL; INFILTRATION; INTRACAUDAL; PERINEURAL
Status: DISPENSED
Start: 2020-09-23

## (undated) RX ORDER — ONDANSETRON 2 MG/ML
INJECTION INTRAMUSCULAR; INTRAVENOUS
Status: DISPENSED
Start: 2020-09-23

## (undated) RX ORDER — ONDANSETRON 2 MG/ML
INJECTION INTRAMUSCULAR; INTRAVENOUS
Status: DISPENSED
Start: 2020-07-13

## (undated) RX ORDER — LIDOCAINE 40 MG/G
CREAM TOPICAL
Status: DISPENSED
Start: 2020-12-07

## (undated) RX ORDER — SODIUM CHLORIDE 9 MG/ML
INJECTION, SOLUTION INTRAVENOUS
Status: DISPENSED
Start: 2020-09-14

## (undated) RX ORDER — FENTANYL CITRATE-0.9 % NACL/PF 10 MCG/ML
PLASTIC BAG, INJECTION (ML) INTRAVENOUS
Status: DISPENSED
Start: 2020-12-21

## (undated) RX ORDER — DEXAMETHASONE SODIUM PHOSPHATE 4 MG/ML
INJECTION, SOLUTION INTRA-ARTICULAR; INTRALESIONAL; INTRAMUSCULAR; INTRAVENOUS; SOFT TISSUE
Status: DISPENSED
Start: 2020-12-21

## (undated) RX ORDER — FENTANYL CITRATE-0.9 % NACL/PF 10 MCG/ML
PLASTIC BAG, INJECTION (ML) INTRAVENOUS
Status: DISPENSED
Start: 2020-09-23

## (undated) RX ORDER — EPHEDRINE SULFATE 50 MG/ML
INJECTION, SOLUTION INTRAMUSCULAR; INTRAVENOUS; SUBCUTANEOUS
Status: DISPENSED
Start: 2020-07-13

## (undated) RX ORDER — FENTANYL CITRATE 50 UG/ML
INJECTION, SOLUTION INTRAMUSCULAR; INTRAVENOUS
Status: DISPENSED
Start: 2020-09-28

## (undated) RX ORDER — LIDOCAINE HYDROCHLORIDE 10 MG/ML
INJECTION, SOLUTION EPIDURAL; INFILTRATION; INTRACAUDAL; PERINEURAL
Status: DISPENSED
Start: 2020-07-11

## (undated) RX ORDER — LIDOCAINE HYDROCHLORIDE 10 MG/ML
INJECTION, SOLUTION EPIDURAL; INFILTRATION; INTRACAUDAL; PERINEURAL
Status: DISPENSED
Start: 2022-09-12

## (undated) RX ORDER — HYDROMORPHONE HYDROCHLORIDE 1 MG/ML
INJECTION, SOLUTION INTRAMUSCULAR; INTRAVENOUS; SUBCUTANEOUS
Status: DISPENSED
Start: 2020-12-21

## (undated) RX ORDER — PROPOFOL 10 MG/ML
INJECTION, EMULSION INTRAVENOUS
Status: DISPENSED
Start: 2020-12-21

## (undated) RX ORDER — LIDOCAINE 40 MG/G
CREAM TOPICAL
Status: DISPENSED
Start: 2018-11-01

## (undated) RX ORDER — LIDOCAINE HYDROCHLORIDE 20 MG/ML
INJECTION, SOLUTION EPIDURAL; INFILTRATION; INTRACAUDAL; PERINEURAL
Status: DISPENSED
Start: 2020-07-19

## (undated) RX ORDER — ATROPINE SULFATE 0.1 MG/ML
INJECTION INTRAVENOUS
Status: DISPENSED
Start: 2022-09-12

## (undated) RX ORDER — LIDOCAINE HYDROCHLORIDE 10 MG/ML
INJECTION, SOLUTION EPIDURAL; INFILTRATION; INTRACAUDAL; PERINEURAL
Status: DISPENSED
Start: 2019-07-10

## (undated) RX ORDER — REGADENOSON 0.08 MG/ML
INJECTION, SOLUTION INTRAVENOUS
Status: DISPENSED
Start: 2022-09-26

## (undated) RX ORDER — SIMETHICONE 40MG/0.6ML
SUSPENSION, DROPS(FINAL DOSAGE FORM)(ML) ORAL
Status: DISPENSED
Start: 2025-05-14

## (undated) RX ORDER — ACETAMINOPHEN 325 MG/1
TABLET ORAL
Status: DISPENSED
Start: 2023-02-06

## (undated) RX ORDER — CYCLOPENTOLAT/TROPIC/PHENYLEPH 1%-1%-2.5%
DROPS (EA) OPHTHALMIC (EYE)
Status: DISPENSED
Start: 2020-12-21

## (undated) RX ORDER — METOPROLOL TARTRATE 1 MG/ML
INJECTION, SOLUTION INTRAVENOUS
Status: DISPENSED
Start: 2024-07-30

## (undated) RX ORDER — HEPARIN SODIUM 1000 [USP'U]/ML
INJECTION, SOLUTION INTRAVENOUS; SUBCUTANEOUS
Status: DISPENSED
Start: 2020-07-16

## (undated) RX ORDER — FENTANYL CITRATE 50 UG/ML
INJECTION, SOLUTION INTRAMUSCULAR; INTRAVENOUS
Status: DISPENSED
Start: 2020-07-16

## (undated) RX ORDER — ACETAMINOPHEN 325 MG/1
TABLET ORAL
Status: DISPENSED
Start: 2022-09-12

## (undated) RX ORDER — LIDOCAINE HYDROCHLORIDE 20 MG/ML
INJECTION, SOLUTION EPIDURAL; INFILTRATION; INTRACAUDAL; PERINEURAL
Status: DISPENSED
Start: 2020-12-21

## (undated) RX ORDER — PROPARACAINE HYDROCHLORIDE 5 MG/ML
SOLUTION/ DROPS OPHTHALMIC
Status: DISPENSED
Start: 2023-02-06

## (undated) RX ORDER — LIDOCAINE 40 MG/G
CREAM TOPICAL
Status: DISPENSED
Start: 2019-07-10

## (undated) RX ORDER — ONDANSETRON 2 MG/ML
INJECTION INTRAMUSCULAR; INTRAVENOUS
Status: DISPENSED
Start: 2020-12-21

## (undated) RX ORDER — ALBUTEROL SULFATE 0.83 MG/ML
SOLUTION RESPIRATORY (INHALATION)
Status: DISPENSED
Start: 2019-07-09

## (undated) RX ORDER — LIDOCAINE HYDROCHLORIDE 20 MG/ML
INJECTION, SOLUTION EPIDURAL; INFILTRATION; INTRACAUDAL; PERINEURAL
Status: DISPENSED
Start: 2020-07-13

## (undated) RX ORDER — FENTANYL CITRATE 50 UG/ML
INJECTION, SOLUTION INTRAMUSCULAR; INTRAVENOUS
Status: DISPENSED
Start: 2020-09-23

## (undated) RX ORDER — FENTANYL CITRATE 50 UG/ML
INJECTION, SOLUTION INTRAMUSCULAR; INTRAVENOUS
Status: DISPENSED
Start: 2022-09-12

## (undated) RX ORDER — LIDOCAINE 40 MG/G
CREAM TOPICAL
Status: DISPENSED
Start: 2021-01-05

## (undated) RX ORDER — LIDOCAINE HYDROCHLORIDE 10 MG/ML
INJECTION, SOLUTION EPIDURAL; INFILTRATION; INTRACAUDAL; PERINEURAL
Status: DISPENSED
Start: 2023-07-17

## (undated) RX ORDER — EPHEDRINE SULFATE 50 MG/ML
INJECTION, SOLUTION INTRAMUSCULAR; INTRAVENOUS; SUBCUTANEOUS
Status: DISPENSED
Start: 2020-09-23

## (undated) RX ORDER — LIDOCAINE HYDROCHLORIDE 10 MG/ML
INJECTION, SOLUTION EPIDURAL; INFILTRATION; INTRACAUDAL; PERINEURAL
Status: DISPENSED
Start: 2020-10-12

## (undated) RX ORDER — MAGNESIUM SULFATE HEPTAHYDRATE 40 MG/ML
INJECTION, SOLUTION INTRAVENOUS
Status: DISPENSED
Start: 2020-09-14

## (undated) RX ORDER — HEPARIN SODIUM 1000 [USP'U]/ML
INJECTION, SOLUTION INTRAVENOUS; SUBCUTANEOUS
Status: DISPENSED
Start: 2020-07-02

## (undated) RX ORDER — ASPIRIN 325 MG
TABLET ORAL
Status: DISPENSED
Start: 2023-07-17

## (undated) RX ORDER — SODIUM CHLORIDE 9 MG/ML
INJECTION, SOLUTION INTRAVENOUS
Status: DISPENSED
Start: 2021-07-20

## (undated) RX ORDER — AMINOPHYLLINE 25 MG/ML
INJECTION, SOLUTION INTRAVENOUS
Status: DISPENSED
Start: 2025-06-02

## (undated) RX ORDER — CEFAZOLIN SODIUM 1 G/3ML
INJECTION, POWDER, FOR SOLUTION INTRAMUSCULAR; INTRAVENOUS
Status: DISPENSED
Start: 2020-07-20

## (undated) RX ORDER — REGADENOSON 0.08 MG/ML
INJECTION, SOLUTION INTRAVENOUS
Status: DISPENSED
Start: 2025-06-02

## (undated) RX ORDER — PROPOFOL 10 MG/ML
INJECTION, EMULSION INTRAVENOUS
Status: DISPENSED
Start: 2020-07-13

## (undated) RX ORDER — NITROGLYCERIN 5 MG/ML
VIAL (ML) INTRAVENOUS
Status: DISPENSED
Start: 2021-07-20

## (undated) RX ORDER — FENTANYL CITRATE 50 UG/ML
INJECTION, SOLUTION INTRAMUSCULAR; INTRAVENOUS
Status: DISPENSED
Start: 2020-07-14